# Patient Record
Sex: MALE | Race: WHITE | NOT HISPANIC OR LATINO | Employment: OTHER | URBAN - METROPOLITAN AREA
[De-identification: names, ages, dates, MRNs, and addresses within clinical notes are randomized per-mention and may not be internally consistent; named-entity substitution may affect disease eponyms.]

---

## 2017-01-17 ENCOUNTER — OFFICE VISIT (OUTPATIENT)
Dept: FAMILY MEDICINE | Facility: CLINIC | Age: 67
End: 2017-01-17
Payer: COMMERCIAL

## 2017-01-17 ENCOUNTER — TELEPHONE (OUTPATIENT)
Dept: FAMILY MEDICINE | Facility: CLINIC | Age: 67
End: 2017-01-17

## 2017-01-17 ENCOUNTER — LAB VISIT (OUTPATIENT)
Dept: LAB | Facility: HOSPITAL | Age: 67
End: 2017-01-17
Attending: FAMILY MEDICINE
Payer: COMMERCIAL

## 2017-01-17 VITALS
TEMPERATURE: 98 F | HEART RATE: 95 BPM | DIASTOLIC BLOOD PRESSURE: 94 MMHG | SYSTOLIC BLOOD PRESSURE: 141 MMHG | RESPIRATION RATE: 18 BRPM | BODY MASS INDEX: 26.95 KG/M2 | HEIGHT: 74 IN | OXYGEN SATURATION: 96 % | WEIGHT: 210 LBS

## 2017-01-17 DIAGNOSIS — E11.40 CONTROLLED TYPE 2 DIABETES MELLITUS WITH DIABETIC NEUROPATHY, WITHOUT LONG-TERM CURRENT USE OF INSULIN: Primary | ICD-10-CM

## 2017-01-17 DIAGNOSIS — E11.40 CONTROLLED TYPE 2 DIABETES MELLITUS WITH DIABETIC NEUROPATHY, WITHOUT LONG-TERM CURRENT USE OF INSULIN: ICD-10-CM

## 2017-01-17 DIAGNOSIS — L57.0 AK (ACTINIC KERATOSIS): ICD-10-CM

## 2017-01-17 DIAGNOSIS — E78.5 HYPERLIPIDEMIA, UNSPECIFIED HYPERLIPIDEMIA TYPE: ICD-10-CM

## 2017-01-17 DIAGNOSIS — N18.3 CHRONIC KIDNEY DISEASE (CKD), STAGE 3 (MODERATE): ICD-10-CM

## 2017-01-17 DIAGNOSIS — I10 ESSENTIAL HYPERTENSION: ICD-10-CM

## 2017-01-17 DIAGNOSIS — Z87.19 HISTORY OF ULCERATIVE COLITIS: ICD-10-CM

## 2017-01-17 DIAGNOSIS — M10.9 GOUT, UNSPECIFIED: ICD-10-CM

## 2017-01-17 LAB
ALBUMIN SERPL BCP-MCNC: 3.5 G/DL
ALP SERPL-CCNC: 103 U/L
ALT SERPL W/O P-5'-P-CCNC: 106 U/L
ANION GAP SERPL CALC-SCNC: 11 MMOL/L
AST SERPL-CCNC: 67 U/L
BASOPHILS # BLD AUTO: 0.02 K/UL
BASOPHILS NFR BLD: 0.1 %
BILIRUB SERPL-MCNC: 1.1 MG/DL
BUN SERPL-MCNC: 27 MG/DL
CALCIUM SERPL-MCNC: 9.3 MG/DL
CHLORIDE SERPL-SCNC: 106 MMOL/L
CHOLEST/HDLC SERPL: 3.8 {RATIO}
CO2 SERPL-SCNC: 24 MMOL/L
CREAT SERPL-MCNC: 1.1 MG/DL
DIFFERENTIAL METHOD: ABNORMAL
EOSINOPHIL # BLD AUTO: 0 K/UL
EOSINOPHIL NFR BLD: 0.1 %
ERYTHROCYTE [DISTWIDTH] IN BLOOD BY AUTOMATED COUNT: 13.9 %
EST. GFR  (AFRICAN AMERICAN): >60 ML/MIN/1.73 M^2
EST. GFR  (NON AFRICAN AMERICAN): >60 ML/MIN/1.73 M^2
ESTIMATED AVG GLUCOSE: 148 MG/DL
GLUCOSE SERPL-MCNC: 126 MG/DL
HBA1C MFR BLD HPLC: 6.8 %
HCT VFR BLD AUTO: 44.6 %
HDL/CHOLESTEROL RATIO: 26 %
HDLC SERPL-MCNC: 150 MG/DL
HDLC SERPL-MCNC: 39 MG/DL
HGB BLD-MCNC: 15.4 G/DL
LDLC SERPL CALC-MCNC: 94.2 MG/DL
LYMPHOCYTES # BLD AUTO: 1.1 K/UL
LYMPHOCYTES NFR BLD: 5.7 %
MCH RBC QN AUTO: 30 PG
MCHC RBC AUTO-ENTMCNC: 34.5 %
MCV RBC AUTO: 87 FL
MONOCYTES # BLD AUTO: 0.6 K/UL
MONOCYTES NFR BLD: 3.1 %
NEUTROPHILS # BLD AUTO: 17.3 K/UL
NEUTROPHILS NFR BLD: 91 %
NONHDLC SERPL-MCNC: 111 MG/DL
PLATELET # BLD AUTO: 181 K/UL
PMV BLD AUTO: 12.4 FL
POTASSIUM SERPL-SCNC: 3.8 MMOL/L
PROT SERPL-MCNC: 6.8 G/DL
RBC # BLD AUTO: 5.14 M/UL
SODIUM SERPL-SCNC: 141 MMOL/L
TRIGL SERPL-MCNC: 84 MG/DL
TSH SERPL DL<=0.005 MIU/L-ACNC: 0.41 UIU/ML
WBC # BLD AUTO: 18.97 K/UL

## 2017-01-17 PROCEDURE — 3046F HEMOGLOBIN A1C LEVEL >9.0%: CPT | Mod: S$GLB,,, | Performed by: FAMILY MEDICINE

## 2017-01-17 PROCEDURE — 3080F DIAST BP >= 90 MM HG: CPT | Mod: S$GLB,,, | Performed by: FAMILY MEDICINE

## 2017-01-17 PROCEDURE — 3066F NEPHROPATHY DOC TX: CPT | Mod: S$GLB,,, | Performed by: FAMILY MEDICINE

## 2017-01-17 PROCEDURE — 17110 DESTRUCTION B9 LES UP TO 14: CPT | Mod: S$GLB,,, | Performed by: FAMILY MEDICINE

## 2017-01-17 PROCEDURE — 99215 OFFICE O/P EST HI 40 MIN: CPT | Mod: 25,S$GLB,, | Performed by: FAMILY MEDICINE

## 2017-01-17 PROCEDURE — 36415 COLL VENOUS BLD VENIPUNCTURE: CPT

## 2017-01-17 PROCEDURE — 85025 COMPLETE CBC W/AUTO DIFF WBC: CPT

## 2017-01-17 PROCEDURE — 99999 PR PBB SHADOW E&M-EST. PATIENT-LVL IV: CPT | Mod: PBBFAC,,, | Performed by: FAMILY MEDICINE

## 2017-01-17 PROCEDURE — 80061 LIPID PANEL: CPT

## 2017-01-17 PROCEDURE — 2022F DILAT RTA XM EVC RTNOPTHY: CPT | Mod: S$GLB,,, | Performed by: FAMILY MEDICINE

## 2017-01-17 PROCEDURE — 80053 COMPREHEN METABOLIC PANEL: CPT

## 2017-01-17 PROCEDURE — 1160F RVW MEDS BY RX/DR IN RCRD: CPT | Mod: S$GLB,,, | Performed by: FAMILY MEDICINE

## 2017-01-17 PROCEDURE — 1159F MED LIST DOCD IN RCRD: CPT | Mod: S$GLB,,, | Performed by: FAMILY MEDICINE

## 2017-01-17 PROCEDURE — 3077F SYST BP >= 140 MM HG: CPT | Mod: S$GLB,,, | Performed by: FAMILY MEDICINE

## 2017-01-17 PROCEDURE — 1157F ADVNC CARE PLAN IN RCRD: CPT | Mod: S$GLB,,, | Performed by: FAMILY MEDICINE

## 2017-01-17 PROCEDURE — 84443 ASSAY THYROID STIM HORMONE: CPT

## 2017-01-17 PROCEDURE — 83036 HEMOGLOBIN GLYCOSYLATED A1C: CPT

## 2017-01-17 RX ORDER — FINASTERIDE 5 MG/1
5 TABLET, FILM COATED ORAL DAILY
COMMUNITY
End: 2017-12-20 | Stop reason: SDUPTHER

## 2017-01-17 NOTE — PROGRESS NOTES
(Portions of this note were dictated using voice recognition software and may contain dictation related errors in spelling/grammar/syntax not found on text review)    CC: No chief complaint on file.      HPI: 66 y.o. male for annual exam.  Feeling well.  No  Complaints.  Feeling well.    Diabetes: Stable on metformin 500 mg twice a day.  A1c has been controlled next    Hypertension, on hydrochlorothiazide 12.5 m g daily, lisinopril 5 mrem daily, metoprolol 50 mg daily.  Blood pressure is mildly suboptimal today but this is unusual for him as he states that he has been dealing with his wife's illness recently and has been stressed.  He checks his blood pressure daily and usually gets  110-120/70-80.  Occasionally gets hypotensive and has to stop his metoprolol for couple of days    Chronic kidney disease, stable.  Sees nephrology, needs referral    Hyper lipidemia on pravastatin 40 mg daily next    Sees outside cardiologist, Dr. aDo.  Needs referral.  Does not have established CAD    Lumbar radiculopathy on the last, seen pain management.  Currently doing well with this.  Does get some residual numbness in his left leg.    Needs referral for his eye exam which has been over a year ago next    Did have elevated PSA, saw urology.  On finasteride, PSA has normalized    Also has a history of actinic keratosis, has several lesions that have been bothering him recently on the left arm and also left ear.  Does see a dermatologist periodically for treatment of this but would like at least the most bothersome lesions frozen today    Past Medical History   Diagnosis Date    BPH (benign prostatic hyperplasia)      s/p TURP    Chronic kidney disease     DDD (degenerative disc disease) 10/21/2013    Diabetes mellitus     Diabetes mellitus with renal complications     Disc disease, degenerative, cervical     GERD (gastroesophageal reflux disease)     Gout, chronic     History of ulcerative colitis      s/p colectomy  and ileostomy    HLD (hyperlipidemia)     HTN (hypertension)     Ileostomy in place 1982       Past Surgical History   Procedure Laterality Date    Colectomy and ileostomy      Transurethral resection of prostate      Cervical fusion         No family history on file.    Social History     Social History    Marital status:      Spouse name: N/A    Number of children: N/A    Years of education: N/A     Occupational History    Not on file.     Social History Main Topics    Smoking status: Never Smoker    Smokeless tobacco: Never Used    Alcohol use No    Drug use: No    Sexual activity: Not on file     Other Topics Concern    Not on file     Social History Narrative     SCREENINGS:   PSA from 2015 was mildly elevatd at 4.3, saw urology.  Placed on finasteride and normalized to `1.4 6/6/16   Colonoscopy, patient is status post colectomy   Bone density 2010 is normal    Immunizations  Flu shot: utd    Tetanus Adacel 2015  PVX 9/5/14  PCV: utd 10.16      Lab Results   Component Value Date    WBC 6.42 09/10/2015    HGB 15.2 09/10/2015    HCT 45.2 09/10/2015     (L) 09/10/2015    CHOL 128 09/10/2015    TRIG 130 09/10/2015    HDL 35 (L) 09/10/2015    ALT 39 09/10/2015    AST 36 09/10/2015     01/08/2016    K 4.2 01/08/2016     01/08/2016    CREATININE 1.2 01/08/2016    BUN 19 01/08/2016    CO2 29 01/08/2016    TSH 1.035 09/10/2015    PSA 2.1 09/15/2014    INR 1.2 07/08/2010    HGBA1C 6.6 (H) 09/10/2015    LDLCALC 67.0 09/10/2015     (H) 01/08/2016       ROS:  GENERAL: No fever, chills, fatigability or weight loss.  SKIN: No rashes, no itching.  HEAD: No headaches.  EYES: No visual changes  EARS: No ear pain or changes in hearing.  NOSE: No congestion or rhinorrhea.  MOUTH & THROAT: No hoarseness, change in voice, or sore throat.  NODES: Denies swollen glands.  CHEST: Denies WILSON, cyanosis, wheezing, cough and sputum production.  CARDIOVASCULAR: Denies chest pain, PND,  orthopnea.  ABDOMEN: No nausea, vomiting, or changes in bowel function.  URINARY: No flank pain, dysuria or hematuria.  PERIPHERAL VASCULAR: No claudication or cyanosis.  MUSCULOSKELETAL: No joint stiffness or swelling. Denies back pain.  NEUROLOGIC: above    Vital signs reviewed  PE:   APPEARANCE: Well nourished, well developed, in no acute distress.    HEAD: Normocephalic, atraumatic.  SKIN: Multiple actinic keratoses noted on left arm, also left ear.  Chronic depigmentation changesfrom prior procedures.  EYES: PERRL. EOMI.   Conjunctivae noninjected.  EARS: TM's intact. Light reflex normal. No retraction or perforation  NOSE: Mucosa pink. Airway clear.  MOUTH & THROAT: No tonsillar enlargement. No pharyngeal erythema or exudate.   NECK: Supple with no cervical lymphadenopathy. No carotid bruits, no thyromegaly  CHEST: Good inspiratory effort. Lungs clear to auscultation with no wheezes or crackles.  CARDIOVASCULAR: Normal S1, S2. No rubs, murmurs, or gallops.  ABDOMEN: Bowel sounds normal. Not distended. Soft. No tenderness or masses. No organomegaly.  EXTREMITIES: No edema, cyanosis, or clubbing.  Protective Sensation (w/ 10 gram monofilament):  Right: Intact  Left: Intact    Visual Inspection:  Normal -  Bilateral    Pedal Pulses:   Right: Present  Left: Present    Posterior tibialis:   Right:Present  Left: Present        IMPRESSION  1. Controlled type 2 diabetes mellitus with diabetic neuropathy, without long-term current use of insulin    2. Gout, unspecified    3. Chronic kidney disease (CKD), stage 3 (moderate)    4. Essential hypertension    5. Hyperlipidemia, unspecified hyperlipidemia type    6. History of ulcerative colitis    7. AK (actinic keratosis)            PLAN  Diabetes health maintenance:  -- advise regular and consistent glucose monitoring and medication compliance.  -- advise daily foot checks  -- advise yearly ophthalmologic exams  -- advise adequate dietary and exercise modification  --  advise regular dental visits  -- advise daily low-dose aspirin use if patient is not on other anticoagulants and there are no other contraindications.  -- Medication management: Continue metformin 500 mg twice a day next    Hypertension: Suboptimal in the office but controlled at home as above.  Given some hypotensive tendencies, will continue his current medications without addition of new medications or increasing any doses    Hyperlipidemia: Continue pravastatin.  Check labs    Ulcerative colitis: Status post colectomy, stable.    Chronic disease: Needs referral back to nephrology, provided next    Also needs referral to his cardiologist, this is been provided as well.  Next    Needs referral for eye exam, provided    Actinic keratosis: Discussed cryotherapy in the office.  Patient agrees to cryotherapy of most worrisome or bothersome lesions.  Cryotherapy ×8 done to the left arm actinic keratosis lesions.  Also cryotherapy ×3 to the left pinna.  May want to reestablish with his dermatologist regarding full body skin exam and treatment of his multiple other actinic keratoses noted on exam    Orders Placed This Encounter   Procedures    CBC auto differential    Comprehensive metabolic panel    Hemoglobin A1c    Lipid panel    TSH    Protein / creatinine ratio, urine    Microalbumin/creatinine urine ratio    Ambulatory referral to Ophthalmology    Ambulatory referral to Cardiology    Ambulatory referral to Nephrology

## 2017-01-17 NOTE — MR AVS SNAPSHOT
[address]  [place] [address]  Phone: [phone]  Fax: [phone]                  Jarrett Charlton Jr.   2017 9:40 AM   Office Visit    Description:  Male : 1950   Provider:  Elvis Ruiz MD   Department:  [facility]           Reason for Visit     Annual Exam           Diagnoses this Visit        Comments    Controlled type 2 diabetes mellitus with diabetic neuropathy, without long-term current use of insulin    -  Primary     Gout, unspecified         Chronic kidney disease (CKD), stage 3 (moderate)         Essential hypertension         Hyperlipidemia, unspecified hyperlipidemia type         History of ulcerative colitis                To Do List           Future Appointments        Provider Department Dept Phone    2017 11:20 AM APPOINTMENT LAB, [facility] [phone]    2017 11:30 AM APPOINTMENT LAB, [facility] [phone]    2/15/2017 8:00 AM DO Lukasz Mejía harsha - Nephrology [phone]      Goals (5 Years of Data)     None      [facility] On Call     [facility] On [facility] Nurse Care Line -  Assistance  Registered nurses in the [facility] Center provide clinical advisement, health education, appointment booking, and other advisory services.  Call for this free service at [phone].             Medications           Message regarding Medications     Verify the changes and/or additions to your medication regime listed below are the same as discussed with your clinician today.  If any of these changes or additions are incorrect, please notify your healthcare provider.        STOP taking these medications     tramadol (ULTRAM) 50 mg tablet TAKE ONE TABLET BY MOUTH EVERY 6 HOURS IF NEEDED FOR PAIN           Verify that the below list of medications is an accurate representation of the medications you are currently taking.  If none  "reported, the list may be blank. If incorrect, please contact your healthcare provider. Carry this list with you in case of emergency.           Current Medications     allopurinol (ZYLOPRIM) 300 MG tablet Take 1.5 tablets (450 mg total) by mouth once daily.    aspirin (ECOTRIN) 81 MG EC tablet Take 81 mg by mouth once daily.      blood sugar diagnostic (ONE TOUCH ULTRA TEST) Strp USE ONE STRIP TO CHECK GLUCOSE EVERY DAY    colchicine 0.6 mg tablet     duloxetine (CYMBALTA) 30 MG capsule Take 1 capsule (30 mg total) by mouth once daily.    finasteride (PROSCAR) 5 mg tablet Take 5 mg by mouth once daily.    gabapentin (NEURONTIN) 300 MG capsule Take 2 capsules (600 mg total) by mouth 2 (two) times daily.    hydrochlorothiazide (HYDRODIURIL) 12.5 MG Tab Take 1 tablet (12.5 mg total) by mouth once daily.    lisinopril (PRINIVIL,ZESTRIL) 5 MG tablet TAKE ONE TABLET BY MOUTH ONCE DAILY    metformin (GLUCOPHAGE) 500 MG tablet Take 1 tablet (500 mg total) by mouth 2 (two) times daily with meals.    metoprolol succinate (TOPROL-XL) 50 MG 24 hr tablet     omeprazole (PRILOSEC) 40 MG capsule Take 1 capsule (40 mg total) by mouth every morning.    pravastatin (PRAVACHOL) 40 MG tablet Take 40 mg by mouth once daily.    diclofenac sodium 1 % Gel Apply 2 g topically 4 (four) times daily.           Clinical Reference Information           Vital Signs - Last Recorded  Most recent update: 1/17/2017 10:15 AM by Stephen Haddad LPN    BP Pulse Temp Resp Ht Wt    (!) 141/94 95 97.6 °F (36.4 °C) (Oral) 18 6' 2" (1.88 m) 95.3 kg (210 lb)    SpO2 BMI             96% 26.96 kg/m2         Blood Pressure          Most Recent Value    BP  (!)  141/94      Allergies as of 1/17/2017     No Known Allergies      Immunizations Administered on Date of Encounter - 1/17/2017     None      Orders Placed During Today's Visit      Normal Orders This Visit    Ambulatory referral to Cardiology     Ambulatory referral to Nephrology     Ambulatory " referral to Ophthalmology     Protein / creatinine ratio, urine     Future Labs/Procedures Expected by Expires    CBC auto differential  1/17/2017 1/17/2018    Comprehensive metabolic panel  1/17/2017 1/17/2018    Lipid panel  1/17/2017 1/17/2018    Microalbumin/creatinine urine ratio  1/17/2017 (Approximate) 1/17/2018    TSH  1/17/2017 1/17/2018    Hemoglobin A1c  As directed 1/17/2018      [facility] Sign-Up     Activating your [facility] account is as easy as 1-2-3!     1) Visit [email or web address], select Sign Up Now, enter this activation code and your date of birth, then select Next.  [email or web address]  Expires: 3/3/2017 10:39 AM      2) Create a username and password to use when you visit [facility] in the future and select a security question in case you lose your password and select Next.    3) Enter your e-mail address and click Sign Up!    Additional Information  If you have questions, please e-mail [email or web address] or call [phone] to talk to our [facility] staff. Remember, [facility] is NOT to be used for urgent needs. For medical emergencies, dial 911.

## 2017-01-18 ENCOUNTER — TELEPHONE (OUTPATIENT)
Dept: FAMILY MEDICINE | Facility: CLINIC | Age: 67
End: 2017-01-18

## 2017-01-20 ENCOUNTER — TELEPHONE (OUTPATIENT)
Dept: FAMILY MEDICINE | Facility: CLINIC | Age: 67
End: 2017-01-20

## 2017-01-20 NOTE — TELEPHONE ENCOUNTER
----- Message from Liv Amaya sent at 1/20/2017  8:52 AM CST -----  Contact: [phone]  Fax [phone]    Patient is requesting to have his forms faxed again the documents partially printed

## 2017-01-22 ENCOUNTER — TELEPHONE (OUTPATIENT)
Dept: FAMILY MEDICINE | Facility: CLINIC | Age: 67
End: 2017-01-22

## 2017-01-22 DIAGNOSIS — R79.89 ELEVATED LFTS: ICD-10-CM

## 2017-01-22 DIAGNOSIS — D72.829 LEUKOCYTOSIS, UNSPECIFIED TYPE: Primary | ICD-10-CM

## 2017-01-23 ENCOUNTER — TELEPHONE (OUTPATIENT)
Dept: FAMILY MEDICINE | Facility: CLINIC | Age: 67
End: 2017-01-23

## 2017-01-23 NOTE — TELEPHONE ENCOUNTER
pls tell pt that diabetes test is controlled. However his white blood cell count was high (not sure if he is getting over any infection recently) and liver test was also elevated. Will recheck a cbc and cmp in 2-3 weeks to follow up on these abnormalities to see if they improve on recheck.

## 2017-01-23 NOTE — TELEPHONE ENCOUNTER
Call placed to pt to inform him on lab results per Dr. Ruiz.  Pt was advised to have labs redone in 2-3 weeks.  Pt verbalized understanding.

## 2017-02-02 ENCOUNTER — TELEPHONE (OUTPATIENT)
Dept: FAMILY MEDICINE | Facility: CLINIC | Age: 67
End: 2017-02-02

## 2017-02-02 NOTE — TELEPHONE ENCOUNTER
----- Message from Jesusita Maldonado sent at 2/1/2017  2:27 PM CST -----  Contact: self/[phone]  Patient says that he requested copies of his blood work from 0/17/17 to be mailed to him and he hasn't received anything and he also states that he was supposed to have referrals for Cardiology, Dr. Mason, optometry, Dr. Sahu, and urology with Dr. Cast and his insurance never received the referrals for these doctors.  Please advise

## 2017-02-15 ENCOUNTER — OFFICE VISIT (OUTPATIENT)
Dept: NEPHROLOGY | Facility: CLINIC | Age: 67
End: 2017-02-15
Payer: COMMERCIAL

## 2017-02-15 VITALS
HEIGHT: 74 IN | HEART RATE: 76 BPM | OXYGEN SATURATION: 98 % | WEIGHT: 210.13 LBS | SYSTOLIC BLOOD PRESSURE: 150 MMHG | BODY MASS INDEX: 26.97 KG/M2 | DIASTOLIC BLOOD PRESSURE: 80 MMHG

## 2017-02-15 DIAGNOSIS — I10 ESSENTIAL HYPERTENSION: ICD-10-CM

## 2017-02-15 DIAGNOSIS — N18.2 CKD (CHRONIC KIDNEY DISEASE) STAGE 2, GFR 60-89 ML/MIN: Primary | ICD-10-CM

## 2017-02-15 DIAGNOSIS — N25.0 RENAL OSTEODYSTROPHY: ICD-10-CM

## 2017-02-15 PROCEDURE — 1159F MED LIST DOCD IN RCRD: CPT | Mod: S$GLB,,, | Performed by: INTERNAL MEDICINE

## 2017-02-15 PROCEDURE — 3077F SYST BP >= 140 MM HG: CPT | Mod: S$GLB,,, | Performed by: INTERNAL MEDICINE

## 2017-02-15 PROCEDURE — 99999 PR PBB SHADOW E&M-EST. PATIENT-LVL III: CPT | Mod: PBBFAC,,, | Performed by: INTERNAL MEDICINE

## 2017-02-15 PROCEDURE — 1160F RVW MEDS BY RX/DR IN RCRD: CPT | Mod: S$GLB,,, | Performed by: INTERNAL MEDICINE

## 2017-02-15 PROCEDURE — 1126F AMNT PAIN NOTED NONE PRSNT: CPT | Mod: S$GLB,,, | Performed by: INTERNAL MEDICINE

## 2017-02-15 PROCEDURE — 3079F DIAST BP 80-89 MM HG: CPT | Mod: S$GLB,,, | Performed by: INTERNAL MEDICINE

## 2017-02-15 PROCEDURE — 1157F ADVNC CARE PLAN IN RCRD: CPT | Mod: S$GLB,,, | Performed by: INTERNAL MEDICINE

## 2017-02-15 PROCEDURE — 99214 OFFICE O/P EST MOD 30 MIN: CPT | Mod: S$GLB,,, | Performed by: INTERNAL MEDICINE

## 2017-02-15 NOTE — PROGRESS NOTES
Subjective:       Patient ID: Jarrett Charlton Jr. is a 66 y.o. White male who presents for follow-up evaluation of No chief complaint on file.    HPI This is a 66-year-old white male with longstanding hypertension, anemia, and chronic kidney disease is coming in for followup of these. DM labile per pt . Bp's stable at home in the 120's- 130's/80's and occ lower  In the 90's. No proteinuria and anemia stable. Recently getting over an URI s/p abx.    PAST MEDICAL HISTORY: Hypertension for 5 years,recently diag diabetes, gout, ulcerative colitis with ostomy and hyperlipidemia.     Review of Systems   Constitutional: Positive for fatigue.   Eyes: Negative for discharge.   Respiratory: Negative for cough, shortness of breath and wheezing.    Cardiovascular: Negative for chest pain and palpitations.   Gastrointestinal: Negative for abdominal pain, diarrhea, nausea and vomiting.   Genitourinary: Negative for dysuria, frequency, hematuria and urgency.   Skin: Negative for color change and rash.   Psychiatric/Behavioral: Negative for confusion.   All other systems reviewed and are negative.      Objective:      Physical Exam   Constitutional: He is oriented to person, place, and time. He appears well-developed and well-nourished.   HENT:   Right Ear: External ear normal.   Left Ear: External ear normal.   Nose: Nose normal.   Mouth/Throat: Normal dentition.   Eyes: Conjunctivae, EOM and lids are normal. Pupils are equal, round, and reactive to light.   Neck: Trachea normal. No thyroid mass present.   Cardiovascular: Normal rate and regular rhythm.  Exam reveals no friction rub.    No murmur heard.  No lower extremity edema   Pulmonary/Chest: Effort normal and breath sounds normal. No respiratory distress. He has no decreased breath sounds. He has no rales.   Abdominal: Soft. Bowel sounds are normal. He exhibits no mass. There is no tenderness. There is no rebound. No hernia.   Musculoskeletal: He exhibits no edema.    Neurological: He is alert and oriented to person, place, and time.   Skin: Skin is warm and dry. No rash noted. No erythema.   Psychiatric: He has a normal mood and affect. Judgment normal. His mood appears not anxious. He does not exhibit a depressed mood.   Oriented to time, place and person.   Vitals reviewed.      Assessment:       No diagnosis found.    Plan:           This is a 66 year-old white male with hypertension,CKD,DM,   ulcerative colitis who is coming in for followup.     PROBLEMS:   1. CKD stage II with an MDRD GFR of 60 mL/minute. His baseline   creatinines are anywhere from 1.5 to 1.7 with average in the 1.4 to 1.5 range with the most recent creatinine being 1.1. His CKD is secondary to age-related nephron loss, HTN along with NSAID use in the past for his gout. Off NSAID's. Hydrate well especially when he has more ostomy output.  2. Hypertension. Bp's stable in the 120's-130's  but at times lower in the 90's.   Monitor bp's closely. Asked pt in the apst to take 12.5 instead of 25 mg but pt is taking 25 mg-decerase to 12.5 mg and monitor.  3. Anemia. H&H is stable.   4. Renal osteodystrophy. His intact PTH along with ca/phos is stable. Repeat. Vit d stable-on vit d.  5. Proteinuria. He has no proteinuria. Recheck. On lisnopril.    We will see the patient back again in 4 Months with rfp, i pth, urine creatinine and urine protein.

## 2017-02-15 NOTE — LETTER
February 15, 2017      Elvis Ruiz MD  [address]  [address]  [facility] [address]           [facility] - Nephrology  [address]  [place] [address]  Phone: [phone]  Fax: [phone]          Patient: Jarrett Chralton Jr.   MR Number: [id number]   YOB: 1950   Date of Visit: 2/15/2017       Dear Dr. Elvis Ruiz:    Thank you for referring Jarrett Charlton to me for evaluation. Attached you will find relevant portions of my assessment and plan of care.    If you have questions, please do not hesitate to call me. I look forward to following Jarrett Charlton along with you.    Sincerely,    Bertin Lyon DO    Enclosure  CC:  No Recipients    If you would like to receive this communication electronically, please contact [email or web address] or [phone] to request more information on [employer] access.    For providers and/or their staff who would like to refer a patient to [facility], please contact us through our one-stop-shop provider referral line, [facility], at [phone].    If you feel you have received this communication in error or would no longer like to receive these types of communications, please e-mail [email or web address]

## 2017-02-17 ENCOUNTER — TELEPHONE (OUTPATIENT)
Dept: NEPHROLOGY | Facility: CLINIC | Age: 67
End: 2017-02-17

## 2017-02-18 ENCOUNTER — TELEPHONE (OUTPATIENT)
Dept: NEPHROLOGY | Facility: CLINIC | Age: 67
End: 2017-02-18

## 2017-02-20 ENCOUNTER — TELEPHONE (OUTPATIENT)
Dept: NEPHROLOGY | Facility: CLINIC | Age: 67
End: 2017-02-20

## 2017-04-17 ENCOUNTER — TELEPHONE (OUTPATIENT)
Dept: NEPHROLOGY | Facility: CLINIC | Age: 67
End: 2017-04-17

## 2017-04-17 NOTE — TELEPHONE ENCOUNTER
Please see earlier tel note from nursing.  Please ask him if he is taking lisinopril 5 mg and is holding HCTZ 12.5 mg.  I don't see any new labs on him. DId he get them somewhere else and can he fax them to us. What are his Bp's?

## 2017-04-17 NOTE — TELEPHONE ENCOUNTER
----- Message from Lianna Angeles MA sent at 4/13/2017  2:56 PM CDT -----  Contact: pt  [phone]      ----- Message -----     From: Laisha Zazueta     Sent: 4/13/2017  11:27 AM       To: Sonali Denton Staff    Sonali  -  Patient was dehyrated and went to see another Dr. Pt was order to stop taking hydrochlorothiazide .  Pt needs to know what other steps to take from the medication   Thanks,

## 2017-04-18 ENCOUNTER — TELEPHONE (OUTPATIENT)
Dept: UROLOGY | Facility: CLINIC | Age: 67
End: 2017-04-18

## 2017-04-18 ENCOUNTER — TELEPHONE (OUTPATIENT)
Dept: NEPHROLOGY | Facility: CLINIC | Age: 67
End: 2017-04-18

## 2017-04-18 DIAGNOSIS — R97.20 ELEVATED PSA: Primary | ICD-10-CM

## 2017-04-18 NOTE — TELEPHONE ENCOUNTER
Ok to continue finasteride.  It should not affect his BP.  Will check his PSA prior to his next visit with me.    Lab Results   Component Value Date    PSA 2.1 09/15/2014    PSA 2.16 05/13/2013    PSA 1.2 03/19/2012    PSADIAG 1.4 06/06/2016    PSADIAG 2.1 10/16/2015    PSADIAG 4.3 (H) 06/19/2015       Diagnoses and all orders for this visit:    Elevated PSA  -     Prostate Specific Antigen, Diagnostic; Future

## 2017-04-18 NOTE — TELEPHONE ENCOUNTER
----- Message from Libby Arias sent at 4/18/2017  8:56 AM CDT -----  Contact: pt [phone]  Pt states he would like to speak with the nurse regarding questions he has about his medications. Pt states he keeps getting dehydrated and was seen in the ER a couple of times for it and the doctor asked him questions about why he was taking finasteride and advised him not to take it. Pt would like to discuss this and find out if this is okay and should he take a different medication.  Please call pt.

## 2017-04-18 NOTE — TELEPHONE ENCOUNTER
States he went to the ER for low blood pressure and dehydration. The ER MD said to stop finasteride. He wants to know should he take something else. He has a follow up with you in [place]

## 2017-06-05 ENCOUNTER — LAB VISIT (OUTPATIENT)
Dept: LAB | Facility: HOSPITAL | Age: 67
End: 2017-06-05
Attending: UROLOGY
Payer: COMMERCIAL

## 2017-06-05 DIAGNOSIS — R97.20 ELEVATED PSA: ICD-10-CM

## 2017-06-05 LAB — COMPLEXED PSA SERPL-MCNC: 1.7 NG/ML

## 2017-06-05 PROCEDURE — 84153 ASSAY OF PSA TOTAL: CPT

## 2017-06-05 PROCEDURE — 36415 COLL VENOUS BLD VENIPUNCTURE: CPT

## 2017-06-06 RX ORDER — COLCHICINE 0.6 MG/1
0.6 TABLET ORAL 2 TIMES DAILY
Qty: 60 TABLET | Refills: 6 | Status: SHIPPED | OUTPATIENT
Start: 2017-06-06 | End: 2017-06-06 | Stop reason: SDUPTHER

## 2017-06-06 RX ORDER — COLCHICINE 0.6 MG/1
0.6 TABLET ORAL 2 TIMES DAILY
Qty: 180 TABLET | Refills: 1 | Status: SHIPPED | OUTPATIENT
Start: 2017-06-06 | End: 2017-10-23 | Stop reason: SDUPTHER

## 2017-06-09 ENCOUNTER — OFFICE VISIT (OUTPATIENT)
Dept: UROLOGY | Facility: CLINIC | Age: 67
End: 2017-06-09
Payer: MEDICARE

## 2017-06-09 VITALS
SYSTOLIC BLOOD PRESSURE: 136 MMHG | WEIGHT: 208 LBS | BODY MASS INDEX: 26.69 KG/M2 | HEART RATE: 87 BPM | DIASTOLIC BLOOD PRESSURE: 90 MMHG | HEIGHT: 74 IN

## 2017-06-09 DIAGNOSIS — Z90.79 S/P TURP: ICD-10-CM

## 2017-06-09 DIAGNOSIS — R97.20 ELEVATED PSA: Primary | ICD-10-CM

## 2017-06-09 PROCEDURE — 99999 PR PBB SHADOW E&M-EST. PATIENT-LVL III: CPT | Mod: PBBFAC,,, | Performed by: UROLOGY

## 2017-06-09 PROCEDURE — 99214 OFFICE O/P EST MOD 30 MIN: CPT | Mod: S$GLB,,, | Performed by: UROLOGY

## 2017-06-09 PROCEDURE — 1159F MED LIST DOCD IN RCRD: CPT | Mod: S$GLB,,, | Performed by: UROLOGY

## 2017-06-09 PROCEDURE — 1126F AMNT PAIN NOTED NONE PRSNT: CPT | Mod: S$GLB,,, | Performed by: UROLOGY

## 2017-06-09 NOTE — PROGRESS NOTES
Jarrett Charlton Jr. is a 67 y.o. man who is here for the evaluation of difficult urination.  Seen him for elevated PSA.  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  He was not interested in SUDS.  S/p colon surgery and his anus is closed.  Therefore dong TRUS bx of prostate is not feasible.  After discussion, we decided to try finasteride to see whether it will lower his PSA.    hx of straining to urinate but not bothered by it.  Even then his urine flow is not strong.  He tried flomax for the past 2 months without significant improvement.  Does feel that he is emptying his bladder completely.  Hx of ulcerative colitis, had colon surgery back in 1985 and his anus was closed.  Has a neuropathy on the right leg.  Hx of sciatic nerve on both sides and received injection on the back.  Hx of diabetes diagnosed 1 year ago.  No family hx of prostate cancer.  He is a .  Denies flank pain, dysuira, hematuria .      Past Medical History:   Diagnosis Date    BPH (benign prostatic hyperplasia)     s/p TURP    Chronic kidney disease     DDD (degenerative disc disease) 10/21/2013    Diabetes mellitus     Diabetes mellitus with renal complications     Disc disease, degenerative, cervical     GERD (gastroesophageal reflux disease)     Gout, chronic     History of ulcerative colitis     s/p colectomy and ileostomy    HLD (hyperlipidemia)     HTN (hypertension)     Ileostomy in place 1982     Past Surgical History:   Procedure Laterality Date    CERVICAL FUSION      colectomy and ileostomy      TRANSURETHRAL RESECTION OF PROSTATE       Social History   Substance Use Topics    Smoking status: Never Smoker    Smokeless tobacco: Never Used    Alcohol use No     History reviewed. No pertinent family history.  Allergy:  No Known Allergies  Outpatient Encounter Prescriptions as of 6/9/2017   Medication Sig Dispense Refill     allopurinol (ZYLOPRIM) 300 MG tablet Take 1.5 tablets (450 mg total) by mouth once daily. 45 tablet 11    aspirin (ECOTRIN) 81 MG EC tablet Take 81 mg by mouth once daily.        blood sugar diagnostic (ONE TOUCH ULTRA TEST) Strp USE ONE STRIP TO CHECK GLUCOSE EVERY  each 11    colchicine 0.6 mg tablet Take 1 tablet (0.6 mg total) by mouth 2 (two) times daily. 180 tablet 1    finasteride (PROSCAR) 5 mg tablet Take 5 mg by mouth once daily.      gabapentin (NEURONTIN) 300 MG capsule Take 2 capsules (600 mg total) by mouth 2 (two) times daily. 120 capsule 11    hydrochlorothiazide (HYDRODIURIL) 12.5 MG Tab Take 1 tablet (12.5 mg total) by mouth once daily. 30 tablet 4    lisinopril (PRINIVIL,ZESTRIL) 5 MG tablet TAKE ONE TABLET BY MOUTH ONCE DAILY 30 tablet 11    metformin (GLUCOPHAGE) 500 MG tablet Take 1 tablet (500 mg total) by mouth 2 (two) times daily with meals. 60 tablet 11    metoprolol succinate (TOPROL-XL) 50 MG 24 hr tablet       omeprazole (PRILOSEC) 40 MG capsule Take 1 capsule (40 mg total) by mouth every morning. 30 capsule 6    pravastatin (PRAVACHOL) 40 MG tablet Take 40 mg by mouth once daily.      diclofenac sodium 1 % Gel Apply 2 g topically 4 (four) times daily. 1 Tube 2    [DISCONTINUED] duloxetine (CYMBALTA) 30 MG capsule Take 1 capsule (30 mg total) by mouth once daily. 90 capsule 11     No facility-administered encounter medications on file as of 6/9/2017.      Review of Systems   General ROS: GENERAL:  No weight gain or loss  SKIN:  No rashes or lacerations  HEAD:  No headaches  EYES:  No exophthalmos, jaundice or blindness  EARS:  No dizziness, tinnitus or hearing loss  NOSE:  No changes in smell  MOUTH & THROAT:  No dyskinetic movements or obvious goiter  CHEST:  No shortness of breath, hyperventilation or cough  CARDIOVASCULAR:  No tachycardia or chest pain  ABDOMEN:  No nausea, vomiting, pain, constipation or diarrhea  URINARY:  No frequency, dysuria or sexual  dysfunction  ENDOCRINE:  No polydipsia, polyuria  MUSCULOSKELETAL:  No pain or stiffness of the joints  NEUROLOGIC:  No weakness, sensory changes, seizures, confusion, memory loss, tremor or other abnormal movements  Physical Exam     Vitals:    06/09/17 0823   BP: (!) 136/90   Pulse: 87     General Appearance:  Alert, cooperative, no distress, appears stated age   Head:  Normocephalic, without obvious abnormality, atraumatic   Eyes:  PERRL, conjunctiva/corneas clear, EOM's intact, fundi benign, both eyes   Ears:  Normal TM's and external ear canals, both ears   Nose: Nares normal, septum midline, mucosa normal, no drainage or sinus tenderness   Throat: Lips, mucosa, and tongue normal; teeth and gums normal   Neck: Supple, symmetrical, trachea midline, no adenopathy, thyroid: not enlarged, symmetric, no tenderness/mass/nodules, no carotid bruit or JVD   Back:   Symmetric, no curvature, ROM normal, no CVA tenderness   Lungs:   Clear to auscultation bilaterally, respirations unlabored   Chest Wall:  No tenderness or deformity   Heart:  Regular rate and rhythm, S1, S2 normal, no murmur, rub or gallop   Abdomen:   Soft, non-tender, bowel sounds active all four quadrants,  no masses, no organomegaly  Ileostomy in tact.   Genitalia:  Normal male, normal testicles, normal epididymis, normal vas palpated.       Extremities: Extremities normal, atraumatic, no cyanosis or edema   Pulses: 2+ and symmetric   Skin: Skin color, texture, turgor normal, no rashes or lesions   Lymph nodes: Cervical, supraclavicular, and axillary nodes normal   Neurologic: Normal     Prostate unable to check.     PVR 0 ml per bladder scan.    LABS:  Lab Results   Component Value Date    PSA 2.1 09/15/2014    PSA 2.16 05/13/2013    PSA 1.2 03/19/2012    PSADIAG 1.7 06/05/2017    PSADIAG 1.4 06/06/2016    PSADIAG 2.1 10/16/2015     Lab Results   Component Value Date    CREATININE 1.1 01/17/2017    CREATININE 1.2 01/08/2016    CREATININE 1.4 10/16/2015      Assessment and Plan:  Jarrett was seen today for benign prostatic hypertrophy.    Diagnoses and all orders for this visit:    Elevated PSA  -     Prostate Specific Antigen, Diagnostic; Future    S/P TURP    90 days refills given.  His PSA is normalized and responded well to finasteride.  Reassurance given.  He can be followed by a PA.  If next PSA is stable, may not need a PSA check in the future.  I spent 25 minutes with the patient of which more than half was spent in direct consultation with the patient in regards to our treatment and plan.      Follow-up:  Return in about 1 year (around 6/9/2018) for PSA wtih a PA.

## 2017-06-09 NOTE — PATIENT INSTRUCTIONS
Lab Results   Component Value Date    PSA 2.1 09/15/2014    PSA 2.16 05/13/2013    PSA 1.2 03/19/2012    PSADIAG 1.7 06/05/2017    PSADIAG 1.4 06/06/2016    PSADIAG 2.1 10/16/2015

## 2017-08-04 ENCOUNTER — LAB VISIT (OUTPATIENT)
Dept: LAB | Facility: HOSPITAL | Age: 67
End: 2017-08-04
Payer: COMMERCIAL

## 2017-08-04 DIAGNOSIS — N25.0 RENAL OSTEODYSTROPHY: ICD-10-CM

## 2017-08-04 DIAGNOSIS — I10 ESSENTIAL HYPERTENSION: ICD-10-CM

## 2017-08-04 DIAGNOSIS — E11.9 TYPE 2 DIABETES MELLITUS WITHOUT COMPLICATION: ICD-10-CM

## 2017-08-04 DIAGNOSIS — N18.2 CKD (CHRONIC KIDNEY DISEASE) STAGE 2, GFR 60-89 ML/MIN: ICD-10-CM

## 2017-08-04 LAB
25(OH)D3+25(OH)D2 SERPL-MCNC: 24 NG/ML
ALBUMIN SERPL BCP-MCNC: 3.1 G/DL
ANION GAP SERPL CALC-SCNC: 7 MMOL/L
BUN SERPL-MCNC: 23 MG/DL
CALCIUM SERPL-MCNC: 9.2 MG/DL
CHLORIDE SERPL-SCNC: 107 MMOL/L
CO2 SERPL-SCNC: 29 MMOL/L
CREAT SERPL-MCNC: 1.3 MG/DL
EST. GFR  (AFRICAN AMERICAN): >60 ML/MIN/1.73 M^2
EST. GFR  (NON AFRICAN AMERICAN): 56.1 ML/MIN/1.73 M^2
GLUCOSE SERPL-MCNC: 158 MG/DL
PHOSPHATE SERPL-MCNC: 2.6 MG/DL
POTASSIUM SERPL-SCNC: 4.6 MMOL/L
PTH-INTACT SERPL-MCNC: 69 PG/ML
SODIUM SERPL-SCNC: 143 MMOL/L

## 2017-08-04 PROCEDURE — 80069 RENAL FUNCTION PANEL: CPT

## 2017-08-04 PROCEDURE — 82306 VITAMIN D 25 HYDROXY: CPT

## 2017-08-04 PROCEDURE — 83970 ASSAY OF PARATHORMONE: CPT

## 2017-08-04 PROCEDURE — 36415 COLL VENOUS BLD VENIPUNCTURE: CPT

## 2017-08-10 ENCOUNTER — OFFICE VISIT (OUTPATIENT)
Dept: NEPHROLOGY | Facility: CLINIC | Age: 67
End: 2017-08-10
Payer: COMMERCIAL

## 2017-08-10 VITALS
HEIGHT: 74 IN | WEIGHT: 207.25 LBS | DIASTOLIC BLOOD PRESSURE: 70 MMHG | BODY MASS INDEX: 26.6 KG/M2 | SYSTOLIC BLOOD PRESSURE: 142 MMHG | HEART RATE: 78 BPM | OXYGEN SATURATION: 96 %

## 2017-08-10 DIAGNOSIS — N18.2 CKD (CHRONIC KIDNEY DISEASE) STAGE 2, GFR 60-89 ML/MIN: Primary | ICD-10-CM

## 2017-08-10 DIAGNOSIS — N25.0 RENAL OSTEODYSTROPHY: ICD-10-CM

## 2017-08-10 DIAGNOSIS — I10 ESSENTIAL HYPERTENSION: ICD-10-CM

## 2017-08-10 PROCEDURE — 3078F DIAST BP <80 MM HG: CPT | Mod: S$GLB,,, | Performed by: INTERNAL MEDICINE

## 2017-08-10 PROCEDURE — 1159F MED LIST DOCD IN RCRD: CPT | Mod: S$GLB,,, | Performed by: INTERNAL MEDICINE

## 2017-08-10 PROCEDURE — 99215 OFFICE O/P EST HI 40 MIN: CPT | Mod: S$GLB,,, | Performed by: INTERNAL MEDICINE

## 2017-08-10 PROCEDURE — 3077F SYST BP >= 140 MM HG: CPT | Mod: S$GLB,,, | Performed by: INTERNAL MEDICINE

## 2017-08-10 PROCEDURE — 3008F BODY MASS INDEX DOCD: CPT | Mod: S$GLB,,, | Performed by: INTERNAL MEDICINE

## 2017-08-10 PROCEDURE — 1126F AMNT PAIN NOTED NONE PRSNT: CPT | Mod: S$GLB,,, | Performed by: INTERNAL MEDICINE

## 2017-08-10 PROCEDURE — 99999 PR PBB SHADOW E&M-EST. PATIENT-LVL III: CPT | Mod: PBBFAC,,, | Performed by: INTERNAL MEDICINE

## 2017-08-10 NOTE — PROGRESS NOTES
Subjective:       Patient ID: Jarrett Charlton Jr. is a 67 y.o. White male who presents for follow-up evaluation of No chief complaint on file.    HPI This is a 67-year-old white male with longstanding hypertension, anemia, and chronic kidney disease is coming in for followup of these. DM labile per pt . Bp's  at home in the 108- 112/80's and at times  lower in the 90's. No proteinuria and anemia stable.     PAST MEDICAL HISTORY: Hypertension for 5 years,recently diag diabetes, gout, ulcerative colitis with ostomy and hyperlipidemia.     Review of Systems   Constitutional: Positive for fatigue.   Eyes: Negative for discharge.   Respiratory: Negative for cough, shortness of breath and wheezing.    Cardiovascular: Negative for chest pain and palpitations.   Gastrointestinal: Negative for abdominal pain, diarrhea, nausea and vomiting.   Genitourinary: Negative for dysuria, frequency, hematuria and urgency.   Skin: Negative for color change and rash.   Psychiatric/Behavioral: Negative for confusion.   All other systems reviewed and are negative.      Objective:      Physical Exam   Constitutional: He is oriented to person, place, and time. He appears well-developed and well-nourished.   HENT:   Right Ear: External ear normal.   Left Ear: External ear normal.   Nose: Nose normal.   Mouth/Throat: Normal dentition.   Eyes: Conjunctivae, EOM and lids are normal. Pupils are equal, round, and reactive to light.   Neck: Trachea normal. No thyroid mass present.   Cardiovascular: Normal rate and regular rhythm.  Exam reveals no friction rub.    No murmur heard.  No lower extremity edema   Pulmonary/Chest: Effort normal and breath sounds normal. No respiratory distress. He has no decreased breath sounds. He has no rales.   Abdominal: Soft. Bowel sounds are normal. He exhibits no mass. There is no tenderness. There is no rebound. No hernia.   Ostomy bag inplace.   Musculoskeletal: He exhibits no edema.   Neurological: He is alert  and oriented to person, place, and time.   Skin: Skin is warm and dry. No rash noted. No erythema.   Psychiatric: He has a normal mood and affect. Judgment normal. His mood appears not anxious. He does not exhibit a depressed mood.   Oriented to time, place and person.   Vitals reviewed.      Assessment:       No diagnosis found.    Plan:           This is a 66 year-old white male with hypertension,CKD,DM,   ulcerative colitis who is coming in for followup.     PROBLEMS:   1. CKD stage II with an MDRD GFR of 56 mL/minute. His baseline   creatinines use to beanywhere from 1.5 to 1.7 with average in the 1.3  range with the most recent creatinine being 1.3. His CKD is secondary to age-related nephron loss, HTN along with NSAID use in the past for his gout. Off NSAID's. Hydrate well especially when he has more ostomy output.  2. Hypertension. Bp's low at times lower in the 90's.   Monitor bp's closely. Asked pt to hold HCTZ to help with bp's.   3. Anemia. H&H is stable.   4. Renal osteodystrophy. His intact PTH along with ca/phos is stable.  Vit d low- suggested vit d OTC.  5. Proteinuria. He has no sig proteinuria.  On lisnopril.    We will see the patient back again in 8 Months with rfp, i pth, urine creatinine and urine protein.

## 2017-08-22 DIAGNOSIS — Z12.11 COLON CANCER SCREENING: ICD-10-CM

## 2017-09-18 ENCOUNTER — TELEPHONE (OUTPATIENT)
Dept: RHEUMATOLOGY | Facility: CLINIC | Age: 67
End: 2017-09-18

## 2017-09-18 NOTE — TELEPHONE ENCOUNTER
----- Message from Sanford Campa sent at 9/18/2017 12:34 PM CDT -----  Contact: [email or web address], [phone]  Pt called in stating that he is taking Colchicine, but the copay is too high. He advised that his pharmacy can give him the generic, Colcrys instead, but he needs to know if he can take them. Please call and advise.    Thank you

## 2017-10-09 NOTE — PROGRESS NOTES
(Portions of this note were dictated using voice recognition software and may contain dictation related errors in spelling/grammar/syntax not found on text review)    CC:   Chief Complaint   Patient presents with    Follow-up       HPI: 67 y.o. male   Diabetes: Stable on metformin 500 mg twice a day.  A1c has been controlled       Hypertension, on hydrochlorothiazide 12.5 m g daily, lisinopril 5 mrem daily, metoprolol 50 mg daily.       Chronic kidney disease, stable.  Sees nephrology      Hyperlipidemia on pravastatin 40 mg daily       Sees outside cardiologist, Dr. Dao.   Does not have established CAD     Lumbar radiculopathy on the last, seen pain management.  Currently doing well with this.         Did have elevated PSA, saw urology.  On finasteride, PSA has normalized     Also has a history of actinic keratosis, has large lesion left forearm that has been there for some time, not bothersome but is concerned of getting caught.  Has not seen dermatology in a long time.  Has had lesions frozen before.       Past Medical History:   Diagnosis Date    BPH (benign prostatic hyperplasia)     s/p TURP    Chronic kidney disease     DDD (degenerative disc disease) 10/21/2013    Diabetes mellitus     Diabetes mellitus with renal complications     Disc disease, degenerative, cervical     GERD (gastroesophageal reflux disease)     Gout, chronic     History of ulcerative colitis     s/p colectomy and ileostomy    HLD (hyperlipidemia)     HTN (hypertension)     Ileostomy in place 1982       Past Surgical History:   Procedure Laterality Date    CERVICAL FUSION      colectomy and ileostomy      TRANSURETHRAL RESECTION OF PROSTATE         No family history on file.    Social History     Social History    Marital status:      Spouse name: N/A    Number of children: N/A    Years of education: N/A     Occupational History    Not on file.     Social History Main Topics    Smoking status: Never Smoker     Smokeless tobacco: Never Used    Alcohol use No    Drug use: No    Sexual activity: Not on file     Other Topics Concern    Not on file     Social History Narrative    No narrative on file     SCREENINGS:   PSA from 2015 was mildly elevatd at 4.3, saw urology.  Placed on finasteride and normalized to 1.4  On 6/6/16. 2017 dx psa: 1.7   Colonoscopy, patient is status post colectomy   Bone density 2010 is normal     Immunizations  Flu shot:  Adacel 2015  PVX 9/5/14  PCV: utd 10.16     Lab Results   Component Value Date    WBC 18.97 (H) 01/17/2017    HGB 15.4 01/17/2017    HCT 44.6 01/17/2017     01/17/2017    CHOL 150 01/17/2017    TRIG 84 01/17/2017    HDL 39 (L) 01/17/2017     (H) 01/17/2017    AST 67 (H) 01/17/2017     08/04/2017    K 4.6 08/04/2017     08/04/2017    CREATININE 1.3 08/04/2017    BUN 23 08/04/2017    CO2 29 08/04/2017    TSH 0.414 01/17/2017    PSA 2.1 09/15/2014    INR 1.2 07/08/2010    HGBA1C 6.8 (H) 01/17/2017    LDLCALC 94.2 01/17/2017     (H) 08/04/2017         ROS:  GENERAL: No fever, chills, fatigability or weight loss.  SKIN: above  HEAD: No headaches.  EYES: No visual changes  EARS: No ear pain or changes in hearing.  NOSE: No congestion or rhinorrhea.  MOUTH & THROAT: No hoarseness, change in voice, or sore throat.  NODES: Denies swollen glands.  CHEST: Denies WILSON, cyanosis, wheezing, cough and sputum production.  CARDIOVASCULAR: Denies chest pain, PND, orthopnea.  ABDOMEN: No nausea, vomiting, or changes in bowel function.  URINARY: No flank pain, dysuria or hematuria.  PERIPHERAL VASCULAR: No claudication or cyanosis.  MUSCULOSKELETAL: No joint stiffness or swelling. Denies back pain.  NEUROLOGIC: No weakness or numbness.    Vital signs reviewed  PE:   APPEARANCE: Well nourished, well developed, in no acute distress.    HEAD: Normocephalic, atraumatic.  EYES: PERRL. EOMI.   Conjunctivae noninjected.  EARS: TM's intact. Light reflex normal. No  retraction or perforation  NOSE: Mucosa pink. Airway clear.  MOUTH & THROAT: No tonsillar enlargement. No pharyngeal erythema or exudate.   NECK: Supple with no cervical lymphadenopathy.  No carotid bruits, no thyromegaly  CHEST: Good inspiratory effort. Lungs clear to auscultation with no wheezes or crackles.  CARDIOVASCULAR: Normal S1, S2. No rubs, murmurs, or gallops.  ABDOMEN: Bowel sounds normal. Not distended. Soft. No tenderness.  Ileostomy in place  EXTREMITIES: No edema,.  Patient has multiple actinic keratosis noted on forearms and hands bilaterally.  Also has large hyperkeratotic lesion about 1.5-2 cm on lateral forearm on the left.  This is nontender      IMPRESSION  1. AK (actinic keratosis)    2. Skin lesion of left arm    3. Controlled type 2 diabetes mellitus with stage 3 chronic kidney disease, without long-term current use of insulin    4. Essential hypertension    5. Hyperlipidemia, unspecified hyperlipidemia type    6. History of ulcerative colitis            PLAN  Diabetes health maintenance:  -- advise regular and consistent glucose monitoring and medication compliance.  -- advise daily foot checks  -- advise yearly ophthalmologic exams  -- advise adequate dietary and exercise modification  -- advise regular dental visits  -- advise daily low-dose aspirin use if patient is not on other anticoagulants and there are no other contraindications.  -- Medication management: Continue metformin 500 twice a day.  Check A1c    Hypertension controlled.  Continue current therapy    Hyperlipidemia: Check labs    AK with large hyperkeratotic lesion as above on forearm, either large AK but concern for possible SCC.  Will refer to dermatology for evaluation, likely excision and in discussion of treatment of his numerous recurrent AK's.      Orders Placed This Encounter   Procedures    Comprehensive metabolic panel    Hemoglobin A1c    Lipid panel    CBC auto differential    Ambulatory referral to  Dermatology

## 2017-10-10 ENCOUNTER — OFFICE VISIT (OUTPATIENT)
Dept: FAMILY MEDICINE | Facility: CLINIC | Age: 67
End: 2017-10-10
Payer: COMMERCIAL

## 2017-10-10 VITALS
DIASTOLIC BLOOD PRESSURE: 87 MMHG | WEIGHT: 212.94 LBS | OXYGEN SATURATION: 96 % | SYSTOLIC BLOOD PRESSURE: 135 MMHG | HEIGHT: 74 IN | HEART RATE: 72 BPM | BODY MASS INDEX: 27.33 KG/M2

## 2017-10-10 DIAGNOSIS — I10 ESSENTIAL HYPERTENSION: ICD-10-CM

## 2017-10-10 DIAGNOSIS — N18.30 CONTROLLED TYPE 2 DIABETES MELLITUS WITH STAGE 3 CHRONIC KIDNEY DISEASE, WITHOUT LONG-TERM CURRENT USE OF INSULIN: ICD-10-CM

## 2017-10-10 DIAGNOSIS — L98.9 SKIN LESION OF LEFT ARM: ICD-10-CM

## 2017-10-10 DIAGNOSIS — E11.22 CONTROLLED TYPE 2 DIABETES MELLITUS WITH STAGE 3 CHRONIC KIDNEY DISEASE, WITHOUT LONG-TERM CURRENT USE OF INSULIN: ICD-10-CM

## 2017-10-10 DIAGNOSIS — L57.0 AK (ACTINIC KERATOSIS): Primary | ICD-10-CM

## 2017-10-10 DIAGNOSIS — Z87.19 HISTORY OF ULCERATIVE COLITIS: ICD-10-CM

## 2017-10-10 DIAGNOSIS — E78.5 HYPERLIPIDEMIA, UNSPECIFIED HYPERLIPIDEMIA TYPE: ICD-10-CM

## 2017-10-10 PROCEDURE — 99999 PR PBB SHADOW E&M-EST. PATIENT-LVL IV: CPT | Mod: PBBFAC,,, | Performed by: FAMILY MEDICINE

## 2017-10-10 PROCEDURE — 99214 OFFICE O/P EST MOD 30 MIN: CPT | Mod: S$GLB,,, | Performed by: FAMILY MEDICINE

## 2017-10-12 ENCOUNTER — INITIAL CONSULT (OUTPATIENT)
Dept: DERMATOLOGY | Facility: CLINIC | Age: 67
End: 2017-10-12
Payer: COMMERCIAL

## 2017-10-12 DIAGNOSIS — D48.5 NEOPLASM OF UNCERTAIN BEHAVIOR OF SKIN: Primary | ICD-10-CM

## 2017-10-12 DIAGNOSIS — Z85.828 HISTORY OF NONMELANOMA SKIN CANCER: ICD-10-CM

## 2017-10-12 DIAGNOSIS — L57.0 ACTINIC KERATOSES: ICD-10-CM

## 2017-10-12 PROCEDURE — 17000 DESTRUCT PREMALG LESION: CPT | Mod: S$GLB,,, | Performed by: PATHOLOGY

## 2017-10-12 PROCEDURE — 88342 IMHCHEM/IMCYTCHM 1ST ANTB: CPT | Mod: 26,,, | Performed by: PATHOLOGY

## 2017-10-12 PROCEDURE — 11101 PR BIOPSY, EACH ADDED LESION: CPT | Mod: S$GLB,,, | Performed by: PATHOLOGY

## 2017-10-12 PROCEDURE — 17003 DESTRUCT PREMALG LES 2-14: CPT | Mod: S$GLB,,, | Performed by: PATHOLOGY

## 2017-10-12 PROCEDURE — 99203 OFFICE O/P NEW LOW 30 MIN: CPT | Mod: 25,S$GLB,, | Performed by: PATHOLOGY

## 2017-10-12 PROCEDURE — 11100 PR BIOPSY OF SKIN LESION: CPT | Mod: 59,S$GLB,, | Performed by: PATHOLOGY

## 2017-10-12 PROCEDURE — 88305 TISSUE EXAM BY PATHOLOGIST: CPT | Performed by: PATHOLOGY

## 2017-10-12 PROCEDURE — 99999 PR PBB SHADOW E&M-EST. PATIENT-LVL III: CPT | Mod: PBBFAC,,, | Performed by: PATHOLOGY

## 2017-10-12 NOTE — PATIENT INSTRUCTIONS
Shave Biopsy Wound Care    Your doctor has performed a shave biopsy today.  A band aid and vaseline ointment has been placed over the site.  This should remain in place for 24 hours.  It is recommended that you keep the area dry for the first 24 hours.  After 24 hours, you may remove the band aid and wash the area with warm soap and water and apply Vaseline jelly.  Many patients prefer to use Neosporin or Bacitracin ointment.  This is acceptable; however, know that you can develop an allergy to this medication even if you have used it safely for years.  It is important to keep the area moist.  Letting it dry out and get air slows healing time, and will worsen the scar.  Band aid is optional after first 24 hours.      If you notice increasing redness, tenderness, pain, or yellow drainage at the biopsy site, please notify your doctor.  These are signs of an infection.    If your biopsy site is bleeding, apply firm pressure for 15 minutes straight.  Repeat for another 15 minutes, if it is still bleeding.   If the surgical site continues to bleed, then please contact your doctor.      [facility] [place] [address]/ [phone] FAX/ [email or web address]

## 2017-10-12 NOTE — PROGRESS NOTES
Subjective:       Patient ID:  Jarrett Charlton Jr. is a 67 y.o. male who presents for   Chief Complaint   Patient presents with    Lesion     Left, Right arm and Left ear     66 yo male with history of NMSC presents as a new patient for concerning growths on his left and right forearms. Had them frozen off by Dr. Hernández in the past, about 3 years ago, but the lesions continued to grow. Denies bleeding, tenderness, or itch of lesions. He also has a lesion on his left ear that has been frozen many times but has persisted.       Lesion  - Initial  Affected locations: left arm, right arm and left ear  Duration: 2 years  Signs / symptoms: asymptomatic  Severity: mild  Timing: constant  Aggravated by: nothing  Relieving factors/Treatments tried: cryosurgery  Improvement on treatment: no relief        Review of Systems   Constitutional: Negative for fever, chills and fatigue.   Skin: Positive for activity-related sunscreen use. Negative for daily sunscreen use.   Hematologic/Lymphatic: Bruises/bleeds easily.        Objective:    Physical Exam   Constitutional: He appears well-developed and well-nourished.   Neurological: He is alert and oriented to person, place, and time.   Psychiatric: He has a normal mood and affect.   Skin:   Areas Examined (abnormalities noted in diagram):   Scalp / Hair Palpated and Inspected  Head / Face Inspection Performed  Neck Inspection Performed  RUE Inspected  LUE Inspection Performed                   Diagram Legend     Erythematous scaling macule/papule c/w actinic keratosis       Vascular papule c/w angioma      Pigmented verrucoid papule/plaque c/w seborrheic keratosis      Yellow umbilicated papule c/w sebaceous hyperplasia      Irregularly shaped tan macule c/w lentigo     1-2 mm smooth white papules consistent with Milia      Movable subcutaneous cyst with punctum c/w epidermal inclusion cyst      Subcutaneous movable cyst c/w pilar cyst      Firm pink to brown papule c/w  dermatofibroma      Pedunculated fleshy papule(s) c/w skin tag(s)      Evenly pigmented macule c/w junctional nevus     Mildly variegated pigmented, slightly irregular-bordered macule c/w mildly atypical nevus      Flesh colored to evenly pigmented papule c/w intradermal nevus       Pink pearly papule/plaque c/w basal cell carcinoma      Erythematous hyperkeratotic cursted plaque c/w SCC      Surgical scar with no sign of skin cancer recurrence      Open and closed comedones      Inflammatory papules and pustules      Verrucoid papule consistent consistent with wart     Erythematous eczematous patches and plaques     Dystrophic onycholytic nail with subungual debris c/w onychomycosis     Umbilicated papule    Erythematous-base heme-crusted tan verrucoid plaque consistent with inflamed seborrheic keratosis     Erythematous Silvery Scaling Plaque c/w Psoriasis     See annotation      Assessment / Plan:      Pathology Orders:      Normal Orders This Visit    Tissue Specimen To Pathology, Dermatology     Questions:    Directional Terms:  Other(comment)    Right    Clinical information:  r/o SCC    Specific Site:  proximal forearm    Tissue Specimen To Pathology, Dermatology     Questions:    Directional Terms:  Other(comment)    Right    Clinical information:  r/o SCC    Specific Site:  distal forearm    Tissue Specimen To Pathology, Dermatology     Questions:    Directional Terms:  Other(comment)    Left    Clinical information:  r/o SCC    Specific Site:  proximal forearm    Tissue Specimen To Pathology, Dermatology     Questions:    Directional Terms:  Other(comment)    Left    Superior    Clinical information:  r/o adnexal neoplasm vs BCC vs other    Specific Site:  helix    Tissue Specimen To Pathology, Dermatology     Questions:    Directional Terms:  Other(comment)    Left    Clinical information:  r/o BCC    Specific Site:  neck        Neoplasm of uncertain behavior of skin  1) Left neck: most likely BCC   2)  Left superior helix: BCC vs SCC vs hypertrophic AK   3) Right distal forearm: SCC vs SCC in situ   4) Right proximal forearm: SCC vs SCC in situ   5) Left proximal forearm: SCC vs SCC in situ   Shave biopsy procedure note:    5 Shave biopsies performed after verbal consent including risk of infection, scar, recurrence, need for additional treatment of site. Area prepped with alcohol, anesthetized with approximately 6.0cc of 1% lidocaine with epinephrine. Lesional tissue shaved with razor blade. Hemostasis achieved with application of aluminum chloride followed by hyfrecation. No complications. Dressing applied. Wound care explained.    If biopsy positive for malignancy, refer to Dr. Kimball for Mohs surgery consultation.        Actinic keratoses  Diffuse AKs. Treated 3 AKs on head/neck region with cryodestruction   -Plan to treat diffuse AKs on arms with 120 minutes of PDT     Cryosurgery Procedure Note    Verbal consent from the patient is obtained and the patient is aware of the precancerous quality and need for treatment of these lesions. Liquid nitrogen cryosurgery is applied to the 3 actinic keratoses of head and neck, as detailed in the physical exam, to produce a freeze injury. The patient is aware that blisters may form and is instructed on wound care with gentle cleansing and use of vaseline ointment to keep moist until healed. The patient is supplied a handout on cryosurgery and is instructed to call if lesions do not completely resolve.           Return in about 1 month (around 11/12/2017), or if symptoms worsen or fail to improve, for PDT f/u appt.

## 2017-10-18 ENCOUNTER — TELEPHONE (OUTPATIENT)
Dept: FAMILY MEDICINE | Facility: CLINIC | Age: 67
End: 2017-10-18

## 2017-10-18 NOTE — TELEPHONE ENCOUNTER
Patient requesting a copy of his labs to be put in the mail.  Will put in the mail today to be sent to patient.

## 2017-10-18 NOTE — TELEPHONE ENCOUNTER
----- Message from Stacai Faye sent at 10/18/2017  4:10 PM CDT -----  Contact: Self/ [phone]  Patient would like to speak with you about getting a copy of his blood test results. Please advise.

## 2017-10-24 RX ORDER — COLCHICINE 0.6 MG/1
TABLET, FILM COATED ORAL
Qty: 180 TABLET | Refills: 0 | Status: SHIPPED | OUTPATIENT
Start: 2017-10-24 | End: 2017-10-31

## 2017-10-25 ENCOUNTER — TELEPHONE (OUTPATIENT)
Dept: DERMATOLOGY | Facility: CLINIC | Age: 67
End: 2017-10-25

## 2017-10-25 NOTE — TELEPHONE ENCOUNTER
----- Message from Florencia Koch MD sent at 10/25/2017  9:14 AM CDT -----  pls call pt with the following biopsy results.  Would like for Dr. Kimball to treat SCCIS left proximal forearm (cannot rule out invasive), left neck BCC, right distal forearm SCCIS (cannot rule out invasive), right proximal forearm SCCIS.  Photos in Epic.  Her staff is copied on this message.  Thanks,  bv    1. Skin, left proximal forearm, shave biopsy:  - SQUAMOUS CELL CARCINOMA IN SITU.  - THE ATYPICAL SQUAMOUS EPITHELIUM EXTENDS TO THE BASE OF THE BIOPSY, AND AN UNDERLYING INVASIVE SQUAMOUS CELL CARCINOMA CANNOT BE EXCLUDED.     2. Skin, left superior helix, shave biopsy:  - CHANGES SUGGESTIVE OF CHONDRODERMATITIS NODULARIS HELICIS.    3. Skin, left neck, shave biopsy:  - BASAL CELL CARCINOMA WITH NODULAR GROWTH PATTERN.  - THE TUMOR EXTENDS TO THE DEEP AND LATERAL BIOPSY MARGINS.    1. Skin, right distal forearm, shave biopsy:  - SQUAMOUS CELL CARCINOMA IN SITU.  - THE ATYPICAL SQUAMOUS EPITHELIUM EXTENDS TO THE BASE OF THE BIOPSY, AND AN UNDERLYING INVASIVE SQUAMOUS CELL CARCINOMA CANNOT BE EXCLUDED.     2. Skin, right proximal forearm, shave biopsy:  - SQUAMOUS CELL CARCINOMA IN SITU.  - THE TUMOR EXTENDS TO A LATERAL BIOPSY MARGIN.

## 2017-10-31 ENCOUNTER — INITIAL CONSULT (OUTPATIENT)
Dept: DERMATOLOGY | Facility: CLINIC | Age: 67
End: 2017-10-31
Payer: COMMERCIAL

## 2017-10-31 VITALS
SYSTOLIC BLOOD PRESSURE: 131 MMHG | HEART RATE: 104 BPM | BODY MASS INDEX: 27.21 KG/M2 | HEIGHT: 74 IN | DIASTOLIC BLOOD PRESSURE: 86 MMHG | WEIGHT: 212 LBS

## 2017-10-31 DIAGNOSIS — C44.621 SQUAMOUS CELL CARCINOMA OF UPPER EXTREMITY, UNSPECIFIED LATERALITY: ICD-10-CM

## 2017-10-31 DIAGNOSIS — C44.41 BASAL CELL CARCINOMA OF LEFT SIDE OF NECK: Primary | ICD-10-CM

## 2017-10-31 PROCEDURE — 99214 OFFICE O/P EST MOD 30 MIN: CPT | Mod: S$GLB,,, | Performed by: DERMATOLOGY

## 2017-10-31 PROCEDURE — 99999 PR PBB SHADOW E&M-EST. PATIENT-LVL III: CPT | Mod: PBBFAC,,, | Performed by: DERMATOLOGY

## 2017-10-31 NOTE — PROGRESS NOTES
REFERRING MD:  Florencia Koch M.D.    CHIEF COMPLAINT:  New patient being consulted for Mohs' surgery evaluation.    HISTORY OF PRESENT ILLNESS:  67 y.o. male presents with a 2 year(s) history of growths on the R distal forearm, R proximal forearm, and L proximal forearm, and a 1 year(s) history of growth on the L neck.    Negative for scabbing.  Negative for crusting.  Negative for bleeding.  Negative for itching.    Biopsies of R distal forearm, R proximal forearm, and L proximal forearm consistent with squamous cell carcinoma in situ.   Biopsy of L neck consistent with basal cell carcinoma.    No prior treatment to any of these sites.    Pacemaker: No  Defibrillator: No  Artificial joints: No  Artificial heart valves: No    PAST MEDICAL HISTORY:  Past Medical History:   Diagnosis Date    Basal cell carcinoma     BPH (benign prostatic hyperplasia)     s/p TURP    Chronic kidney disease     DDD (degenerative disc disease) 10/21/2013    Diabetes mellitus     Diabetes mellitus with renal complications     Disc disease, degenerative, cervical     GERD (gastroesophageal reflux disease)     Gout, chronic     History of ulcerative colitis     s/p colectomy and ileostomy    HLD (hyperlipidemia)     HTN (hypertension)     Ileostomy in place 1982    Squamous cell carcinoma        PAST SURGICAL HISTORY:  Past Surgical History:   Procedure Laterality Date    CERVICAL FUSION      colectomy and ileostomy      TRANSURETHRAL RESECTION OF PROSTATE          SOCIAL HISTORY:  Dependencies:  never smoked    PERTINENT MEDICATIONS:  See medications list.  aspirin    ALLERGIES:  Patient has no known allergies.    ROS:  Skin: See HPI  Constitutional: No fatigue, fever, malaise, weight loss, or night sweats.  Cardiovascular: No chest pain, palpitations, or edema.  Respiratory: No coughing, wheezing, SOB, or sputum production.    Physical Exam   HENT:   Head:       Skin:                 R proximal forearm      R distal  forearm      L proximal forearm      L neck    General: Mood and affect normal. Alert and orient X3. Normal appearance.  Neck: L neck with a 10 x 10 mm pink pearly plaque located 4 cm inferiorly from the left inferior lobe attachment and 6 cm posteriorly.   RUE: R proximal forearm with a 20 x 25 mm pink scaly plaque located 19 cm proximally from the right ulnar base of wrist and 1 cm radially.  R distal forearm with an 11 x 11 mm pink scaly plaque located 3.5 cm proximally from the right radial base of wrist and 1.5 cm ulnarly.   LUE: L proximal forearm with a 17 x 20 mm pink nodular plaque located 21 cm proximally from the left ulnar base of wrist and 3.5 cm radially.   Bilateral upper extremities with diffuse scaly erythematous plaques.    IMPRESSION:  Biopsy proven squamous cell carcinoma in situ, R distal forearm and R proximal forearm, path# [id number].  Biopsy proven nodular basal cell carcinoma- L neck, and squamous cell carcinoma in situ- L proximal forearm, path# [id number].    PLAN:  The diagnosis and the pathology report were discussed in detail with the patient. Treatment options were reviewed, including Mohs Micrographic Surgery, radiation, topical therapy, and standard excision.  After careful review of patient's history and physical exam, and after discussion of treatment options, the decision was made to perform Mohs micrographic surgery.    Scheduled patient for Mohs Micrographic Surgery.- for the left neck, then on another day for the left forearm, and then on another day for the R forearm x2. Risks, benefits, and alternatives of Mohs' surgery discussed with the patient. Discussed repair options including complex closure, skin flap, skin graft and second intention healing with the patient. Pre-operative instructions provided to the patient. Okay to stay on aspirin.    Spent 30 minutes in coordination of care and/or consultation with patient discussing diagnosis, treatment options, risks and  benefits of each. All questions answered.

## 2017-11-06 ENCOUNTER — PROCEDURE VISIT (OUTPATIENT)
Dept: DERMATOLOGY | Facility: CLINIC | Age: 67
End: 2017-11-06
Payer: COMMERCIAL

## 2017-11-06 VITALS
HEIGHT: 74 IN | SYSTOLIC BLOOD PRESSURE: 134 MMHG | HEART RATE: 73 BPM | DIASTOLIC BLOOD PRESSURE: 90 MMHG | BODY MASS INDEX: 26.31 KG/M2 | WEIGHT: 205 LBS

## 2017-11-06 DIAGNOSIS — C44.41 BASAL CELL CARCINOMA OF LEFT SIDE OF NECK: Primary | ICD-10-CM

## 2017-11-06 PROCEDURE — 99499 UNLISTED E&M SERVICE: CPT | Mod: S$GLB,,, | Performed by: DERMATOLOGY

## 2017-11-06 PROCEDURE — 13132 CMPLX RPR F/C/C/M/N/AX/G/H/F: CPT | Mod: 51,S$GLB,, | Performed by: DERMATOLOGY

## 2017-11-06 PROCEDURE — 17312 MOHS ADDL STAGE: CPT | Mod: S$GLB,,, | Performed by: DERMATOLOGY

## 2017-11-06 PROCEDURE — 17311 MOHS 1 STAGE H/N/HF/G: CPT | Mod: S$GLB,,, | Performed by: DERMATOLOGY

## 2017-11-06 RX ORDER — OXYCODONE AND ACETAMINOPHEN 5; 325 MG/1; MG/1
1 TABLET ORAL
Qty: 20 TABLET | Refills: 0 | Status: ON HOLD | OUTPATIENT
Start: 2017-11-06 | End: 2017-11-10

## 2017-11-06 NOTE — PROGRESS NOTES
PROCEDURE: Mohs' Micrographic Surgery    INDICATION: Biopsy-proven skin cancer of cosmetically and functionally important areas, including head, neck, genital, hand, foot, or areas known for having difficulty in healing, such as the lower anterior legs. Tumor with ill-defined borders.    REFERRING MD: Florencia Koch M.D.    CASE NUMBER:     ANESTHETIC: 6 cc 0.5% Lidocaine with Epi 1:200,000 mixed 1:1 with 0.5% Bupivacaine    SURGICAL PREP: Hibiclens    SURGEON: Maurice Kimball MD    ASSISTANTS: Ibis Rodriguez PA-C and Kayley Barahona, Surg Tech    PREOPERATIVE DIAGNOSIS: basal cell carcinoma    POSTOPERATIVE DIAGNOSIS: basal cell carcinoma    PATHOLOGIC DIAGNOSIS: basal cell carcinoma- nodular, infiltrating, superficial    HISTOLOGY OF SPECIMENS IN FIRST STAGE:   Tumor Type: Tumor seen. Superficial basal cell carcinoma: Foci of basaloid cells with peripheral palisading and focal retraction artifact arising along the dermoepidermal junction and extending into the papillary dermis.  Nodular basal cell carcinoma: Nodular tumor in dermis composed of basaloid cells exhibiting peripheral palisading and retraction artifact.  Infiltrative basal cell carcinoma: Basaloid tumor in dermis characterized by thin elongated strands of basaloid cells infiltrating between dermal collagen bundles.   Depth of Invasion: epidermis and dermis  Perineural Invasion: No    HISTOLOGY OF SPECIMENS IN SUBSEQUENT STAGES:  · Tumor Type: No tumor seen.    STAGES OF MOHS' SURGERY PERFORMED: 2    TUMOR-FREE PLANE ACHIEVED: Yes    HEMOSTASIS: electrocoagulation     SPECIMENS: 5 (2 in stage A and 3 in stage B)    LOCATION: left neck. Patient verified location.    INITIAL LESION SIZE: 0.5 x 0.8 cm    FINAL DEFECT SIZE: 1.7 x 1.9 cm    WOUND REPAIR/DISPOSITION: The patient tolerated Mohs' Micrographic Surgery for a basal cell carcinoma very well. When the tumor was completely removed, a repair of the surgical defect was undertaken.      PROCEDURE:  "Complex Linear Repair    INDICATION: Status post Mohs' Micrographic Surgery for basal cell carcinoma.    CASE NUMBER:     SURGEON: Maurice Kimball MD    ASSISTANTS: Ibis Rodriguez PA-C and Kayley Barahona Surg Tech    ANESTHETIC: 4.5 cc 1% Lidocaine with Epinephrine 1:100,000    SURGICAL PREP: Hibiclens    LOCATION: left neck    DEFECT SIZE: 1.7 x 1.9 cm    WOUND REPAIR/DISPOSITION:  After the patient's carcinoma had been completely removed with Mohs' Micrographic Surgery, a repair of the surgical defect was undertaken. The patient was returned to the operating suite where the area of left neck was prepped, draped, and anesthetized in the usual sterile fashion. The wound was widely undermined in all directions. Then, electrocoagulation was used to obtain meticulous hemostasis. 4-0 Vicryl buried vertical mattress sutures were placed into the subcutaneous and dermal plane to close the wound and danial the cutaneous wound edge. Bilateral dog ears were identified and were removed by a standard Burow's triangle technique. The cutaneous wound edges were closed using interrupted 4-0 Prolene suture.    The patient tolerated the procedure well.    The area was cleaned and dressed appropriately and the patient was given wound care instructions, as well as appointment for follow-up evaluation. Patient was placed on Percocet 5 prn postop pain.    LENGTH OF REPAIR: 3.8 cm    Vitals:    11/06/17 1116 11/06/17 1337   BP: (!) 163/105 (!) 134/90   BP Location: Left arm Left arm   Patient Position: Sitting Sitting   BP Method: Small (Automatic) Small (Automatic)   Pulse: 95 73   Weight: 93 kg (205 lb)    Height: 6' 2" (1.88 m)          "

## 2017-11-10 PROBLEM — I25.10 CAD (CORONARY ARTERY DISEASE): Status: ACTIVE | Noted: 2017-11-10

## 2017-11-11 PROBLEM — Z95.828 PRESENCE OF ARTERIAL STENT: Status: ACTIVE | Noted: 2017-11-11

## 2017-11-13 ENCOUNTER — PROCEDURE VISIT (OUTPATIENT)
Dept: DERMATOLOGY | Facility: CLINIC | Age: 67
End: 2017-11-13
Payer: MEDICARE

## 2017-11-13 VITALS
DIASTOLIC BLOOD PRESSURE: 92 MMHG | SYSTOLIC BLOOD PRESSURE: 156 MMHG | BODY MASS INDEX: 26.56 KG/M2 | HEART RATE: 71 BPM | HEIGHT: 74 IN | WEIGHT: 207 LBS

## 2017-11-13 DIAGNOSIS — C44.629 SQUAMOUS CELL CARCINOMA OF ARM, LEFT: Primary | ICD-10-CM

## 2017-11-13 PROCEDURE — 13121 CMPLX RPR S/A/L 2.6-7.5 CM: CPT | Mod: PBBFAC | Performed by: DERMATOLOGY

## 2017-11-13 PROCEDURE — 99499 UNLISTED E&M SERVICE: CPT | Mod: S$GLB,,, | Performed by: DERMATOLOGY

## 2017-11-13 PROCEDURE — 17313 MOHS 1 STAGE T/A/L: CPT | Mod: 79,PBBFAC | Performed by: DERMATOLOGY

## 2017-11-13 PROCEDURE — 13121 CMPLX RPR S/A/L 2.6-7.5 CM: CPT | Mod: 79,S$GLB,, | Performed by: DERMATOLOGY

## 2017-11-13 PROCEDURE — 17313 MOHS 1 STAGE T/A/L: CPT | Mod: 79,S$GLB,, | Performed by: DERMATOLOGY

## 2017-11-13 RX ORDER — CEPHALEXIN 500 MG/1
500 CAPSULE ORAL 3 TIMES DAILY
Qty: 30 CAPSULE | Refills: 0 | Status: SHIPPED | OUTPATIENT
Start: 2017-11-13 | End: 2017-11-23

## 2017-11-13 NOTE — PROGRESS NOTES
PROCEDURE: Mohs' Micrographic Surgery    INDICATION: Tumors with aggressive clinical behavior (rapidly growing, greater than 1 cm in diameter). Tumor with ill-defined borders. Tumor size >2 cm on the trunk or extremities.    REFERRING MD: Florencia Koch M.D.    CASE NUMBER:     ANESTHETIC: 11 cc 0.5% Lidocaine with Epi 1:200,000 mixed 1:1 with 0.5% Bupivacaine    SURGICAL PREP: Hibiclens    SURGEON: Maurice Kimball MD    ASSISTANTS: Ibis Rodriguez PA-C and Mia New, Surg Tech    PREOPERATIVE DIAGNOSIS: squamous cell carcinoma in situ    POSTOPERATIVE DIAGNOSIS: squamous cell carcinoma in situ    PATHOLOGIC DIAGNOSIS: squamous cell carcinoma in situ    HISTOLOGY OF SPECIMENS IN FIRST STAGE:   Tumor Type: No tumor seen.    STAGES OF MOHS' SURGERY PERFORMED: 1    TUMOR-FREE PLANE ACHIEVED: Yes    HEMOSTASIS: electrocoagulation     SPECIMENS: 5     LOCATION: left proximal forearm. Patient verified location.    INITIAL LESION SIZE: 1.6 x 2.5 cm    FINAL DEFECT SIZE: 2.5 x 3.1 cm    WOUND REPAIR/DISPOSITION: The patient tolerated Mohs' Micrographic Surgery for a basal cell carcinoma very well. When the tumor was completely removed, a repair of the surgical defect was undertaken.      PROCEDURE: Complex Linear Repair    INDICATION: Status post Mohs' Micrographic Surgery for basal cell carcinoma.    CASE NUMBER:     SURGEON: Maurice Kimball MD    ASSISTANTS: Ibis Rodriguez PA-C and Mia New Surg Tech    ANESTHETIC: 6 cc 1% Lidocaine with Epinephrine 1:100,000    SURGICAL PREP: Hibiclens    LOCATION: left proximal forearm    DEFECT SIZE: 2.5 x 3.1 cm    WOUND REPAIR/DISPOSITION:  After the patient's carcinoma had been completely removed with Mohs' Micrographic Surgery, a repair of the surgical defect was undertaken. The patient was returned to the operating suite where the area of left proximal forearm was prepped, draped, and anesthetized in the usual sterile fashion. The wound was widely undermined in all  "directions. Then, electrocoagulation was used to obtain meticulous hemostasis. 4-0 Vicryl buried vertical mattress sutures were placed into the subcutaneous and dermal plane to close the wound and danial the cutaneous wound edge. Bilateral dog ears were identified and were removed by a standard Burow's triangle technique. The cutaneous wound edges were closed using interrupted 4-0 Prolene suture.    The patient tolerated the procedure well.    The area was cleaned and dressed appropriately and the patient was given wound care instructions, as well as appointment for follow-up evaluation. Pt already has Percocet 5 prn postop pain and was placed on Keflex 500 mg TID x 10 days.    LENGTH OF REPAIR: 5.5 cm    Vitals:    11/13/17 1106 11/13/17 1334   BP: (!) 141/91 (!) 156/92   BP Location: Left arm Right arm   Patient Position: Sitting Sitting   BP Method: Medium (Automatic) Medium (Automatic)   Pulse: 77 71   Weight: 93.9 kg (207 lb)    Height: 6' 2" (1.88 m)          "

## 2017-11-13 NOTE — PROGRESS NOTES
67 y.o. male patient is here for suture removal following Mohs' surgery.    Patient reports no problems.    WOUND PE:  The left neck sutures intact. Wound healing well. Good skin edges. No signs or symptoms of infection.    IMPRESSION:  Healing operative site from Mohs' surgery BCC, left neck s/p Mohs with CLC, postop day # 7.    PLAN:  Sutures removed today. Steri-strips applied.  Continue wound care.  Keep moist with Aquaphor.    RTC:  In 2 weeks.

## 2017-11-27 ENCOUNTER — PROCEDURE VISIT (OUTPATIENT)
Dept: DERMATOLOGY | Facility: CLINIC | Age: 67
End: 2017-11-27
Payer: COMMERCIAL

## 2017-11-27 VITALS
HEART RATE: 80 BPM | DIASTOLIC BLOOD PRESSURE: 98 MMHG | WEIGHT: 207 LBS | BODY MASS INDEX: 26.56 KG/M2 | HEIGHT: 74 IN | SYSTOLIC BLOOD PRESSURE: 160 MMHG

## 2017-11-27 DIAGNOSIS — D04.61 SQUAMOUS CELL CARCINOMA IN SITU OF SKIN OF RIGHT FOREARM: Primary | ICD-10-CM

## 2017-11-27 PROCEDURE — 13122 CMPLX RPR S/A/L ADDL 5 CM/>: CPT | Mod: S$GLB,,, | Performed by: DERMATOLOGY

## 2017-11-27 PROCEDURE — 17313 MOHS 1 STAGE T/A/L: CPT | Mod: S$GLB,,, | Performed by: DERMATOLOGY

## 2017-11-27 PROCEDURE — 99499 UNLISTED E&M SERVICE: CPT | Mod: S$GLB,,, | Performed by: DERMATOLOGY

## 2017-11-27 PROCEDURE — 13121 CMPLX RPR S/A/L 2.6-7.5 CM: CPT | Mod: S$GLB,,, | Performed by: DERMATOLOGY

## 2017-11-27 PROCEDURE — 17314 MOHS ADDL STAGE T/A/L: CPT | Mod: S$GLB,,, | Performed by: DERMATOLOGY

## 2017-11-27 RX ORDER — OXYCODONE AND ACETAMINOPHEN 5; 325 MG/1; MG/1
1 TABLET ORAL
Qty: 20 TABLET | Refills: 0 | Status: SHIPPED | OUTPATIENT
Start: 2017-11-27 | End: 2017-12-05

## 2017-11-27 RX ORDER — CEPHALEXIN 500 MG/1
500 CAPSULE ORAL 3 TIMES DAILY
Qty: 21 CAPSULE | Refills: 0 | Status: ON HOLD | OUTPATIENT
Start: 2017-11-27 | End: 2017-12-07

## 2017-11-27 NOTE — PROGRESS NOTES
PROCEDURE: Mohs' Micrographic Surgery    INDICATION: Tumor with ill-defined borders.    REFERRING MD: Florencia Koch M.D.    CASE NUMBER:     ANESTHETIC: 4 cc 0.5% Lidocaine with Epi 1:200,000 mixed 1:1 with 0.5% Bupivacaine    SURGICAL PREP: Hibiclens    SURGEON: Maurice Kimball MD    ASSISTANTS: Ibis Rodriguez PA-C, Kayley Barahona, Surg Tech and Mia New, Surg Tech    PREOPERATIVE DIAGNOSIS: squamous cell carcinoma in situ    POSTOPERATIVE DIAGNOSIS: squamous cell carcinoma in situ    PATHOLOGIC DIAGNOSIS: squamous cell carcinoma in situ; nodular basal cell carcinoma    HISTOLOGY OF SPECIMENS IN FIRST STAGE:   Tumor Type: Tumor seen. Nodular basal cell carcinoma: Nodular tumor in dermis composed of basaloid cells exhibiting peripheral palisading and retraction artifact.   Depth of Invasion: subcutaneous tissue  Perineural Invasion: No    HISTOLOGY OF SPECIMENS IN SUBSEQUENT STAGES:  · Tumor Type: No tumor seen.    STAGES OF MOHS' SURGERY PERFORMED: 2    TUMOR-FREE PLANE ACHIEVED: Yes    HEMOSTASIS: electrocoagulation     SPECIMENS: 3 (2 in stage A and 1 in stage B)    LOCATION: right distal forearm. Patient verified location.    INITIAL LESION SIZE: 0.6 x 0.9 cm    FINAL DEFECT SIZE: 1.1 x 1.3 cm    WOUND REPAIR/DISPOSITION: The patient tolerated Mohs' Micrographic Surgery for a squamous cell carcinoma in situ very well. When the tumor was completely removed, a repair of the surgical defect was undertaken.      PROCEDURE: Complex Linear Repair    INDICATION: Status post Mohs' Micrographic Surgery for squamous cell carcinoma in situ.    CASE NUMBER:     SURGEON: Maurice Kimball MD    ASSISTANTS: Ibis Rodriguez PA-C and Mia New, Surg Tech    ANESTHETIC: 2.5 cc 1% Lidocaine with Epinephrine 1:100,000    SURGICAL PREP: Hibiclens    LOCATION: right distal forearm    DEFECT SIZE: 1.1 x 1.3 cm    WOUND REPAIR/DISPOSITION:  After the patient's carcinoma had been completely removed with Mohs' Micrographic  "Surgery, a repair of the surgical defect was undertaken. The patient was returned to the operating suite where the area of right distal forearm was prepped, draped, and anesthetized in the usual sterile fashion. The wound was widely undermined in all directions. Then, electrocoagulation was used to obtain meticulous hemostasis. 4-0 Vicryl buried vertical mattress sutures were placed into the subcutaneous and dermal plane to close the wound and danial the cutaneous wound edge. Bilateral dog ears were identified and were removed by a standard Burow's triangle technique. The cutaneous wound edges were closed using interrupted 4-0 Prolene suture.    The patient tolerated the procedure well.    The area was cleaned and dressed appropriately and the patient was given wound care instructions, as well as appointment for follow-up evaluation. Patient was placed on Percocet 5 prn postop pain and Keflex 500 mg TID x 7 days.    LENGTH OF REPAIR: 3 cm    Vitals:    11/27/17 1047 11/27/17 1435   BP: (!) 141/97 (!) 160/98   BP Location: Right arm Right arm   Patient Position: Sitting Sitting   BP Method: Medium (Automatic) Medium (Manual)   Pulse: 84 80   Weight: 93.9 kg (207 lb)    Height: 6' 2" (1.88 m)          PROCEDURE: Mohs' Micrographic Surgery    INDICATION: Tumors with aggressive clinical behavior (rapidly growing, greater than 1 cm in diameter). Tumor with ill-defined borders.    REFERRING MD: Florencia Koch M.D.    CASE NUMBER:     ANESTHETIC: 11 cc 0.5% Lidocaine with Epi 1:200,000 mixed 1:1 with 0.5% Bupivacaine    SURGICAL PREP: Hibiclens    SURGEON: Maurice Kimball MD    ASSISTANTS: Ibis Rodriguez PA-C, Kayley Barahona, Surg Tech and Mia New, Surg Tech    PREOPERATIVE DIAGNOSIS: squamous cell carcinoma in situ    POSTOPERATIVE DIAGNOSIS: squamous cell carcinoma in situ    PATHOLOGIC DIAGNOSIS: squamous cell carcinoma in situ    HISTOLOGY OF SPECIMENS IN FIRST STAGE:   Tumor Type: Tumor seen. Squamous cell " carcinoma in situ: Epidermis with full-thickness atypia and variable epidermal maturation.  Epidermal margin missing.   Depth of Invasion: epidermis  Perineural Invasion: No    HISTOLOGY OF SPECIMENS IN SUBSEQUENT STAGES:  · Tumor Type: No tumor seen.    STAGES OF MOHS' SURGERY PERFORMED: 2    TUMOR-FREE PLANE ACHIEVED: Yes    HEMOSTASIS: electrocoagulation     SPECIMENS: 8 (5 in stage A and 3 in stage B)    LOCATION: right proximal forearm. Patient verified location.    INITIAL LESION SIZE: 1.9 x 3.1 cm    FINAL DEFECT SIZE: 3.4 x 3.5 cm    WOUND REPAIR/DISPOSITION: The patient tolerated Mohs' Micrographic Surgery for a squamous cell carcinoma in situ very well. When the tumor was completely removed, a repair of the surgical defect was undertaken.      PROCEDURE: Complex Linear Repair    INDICATION: Status post Mohs' Micrographic Surgery for squamous cell carcinoma in situ.    CASE NUMBER:     SURGEON: Maurice Kimball MD    ASSISTANTS: Ibis Rodriguez PA-C and Mia New, Surg Tech    ANESTHETIC: 6.5 cc 1% Lidocaine with Epinephrine 1:100,000    SURGICAL PREP: Hibiclens    LOCATION: right proximal forearm    DEFECT SIZE: 3.4 x 3.5 cm    WOUND REPAIR/DISPOSITION:  After the patient's carcinoma had been completely removed with Mohs' Micrographic Surgery, a repair of the surgical defect was undertaken. The patient was returned to the operating suite where the area of right proximal forearm was prepped, draped, and anesthetized in the usual sterile fashion. The wound was widely undermined in all directions. Then, electrocoagulation was used to obtain meticulous hemostasis. 3-0 Vicryl buried vertical mattress sutures were placed into the subcutaneous and dermal plane to close the wound and danial the cutaneous wound edge. Bilateral dog ears were identified and were removed by a standard Burow's triangle technique. The cutaneous wound edges were closed using interrupted 4-0 Prolene suture.    The patient tolerated the  "procedure well.    The area was cleaned and dressed appropriately and the patient was given wound care instructions, as well as appointment for follow-up evaluation. Patient was placed on Percocet 5 prn postop pain. And Keflex 500 mg TID x 7 days.    LENGTH OF REPAIR: 8 cm    Vitals:    11/27/17 1047 11/27/17 1435   BP: (!) 141/97 (!) 160/98   BP Location: Right arm Right arm   Patient Position: Sitting Sitting   BP Method: Medium (Automatic) Medium (Manual)   Pulse: 84 80   Weight: 93.9 kg (207 lb)    Height: 6' 2" (1.88 m)          "

## 2017-11-27 NOTE — PROGRESS NOTES
67 y.o. male patient is here for suture removal following Mohs' surgery.    Patient reports no problems.    WOUND PE:  The left proximal forearm sutures intact. Wound healing well. Good skin edges. No signs or symptoms of infection.    IMPRESSION:  Healing operative site from Mohs' surgery SCC, left proximal forearm s/p Mohs with CLC, postop day # 14.    PLAN:  Sutures removed today. Steri-strips applied.  Continue wound care.  Keep moist with Aquaphor.    RTC:  In 2 weeks.

## 2017-11-28 ENCOUNTER — TELEPHONE (OUTPATIENT)
Dept: DERMATOLOGY | Facility: CLINIC | Age: 67
End: 2017-11-28

## 2017-11-28 NOTE — TELEPHONE ENCOUNTER
----- Message from Alice Ayala sent at 11/28/2017  3:17 PM CST -----  Contact: pt at [phone]  Rehana pt-Pt needs to change his s/r appt.

## 2017-12-05 PROBLEM — N17.9 ACUTE KIDNEY INJURY: Status: ACTIVE | Noted: 2017-12-05

## 2017-12-06 PROBLEM — R79.89 ABNORMAL LFTS: Status: ACTIVE | Noted: 2017-12-06

## 2017-12-06 PROBLEM — I95.1 ORTHOSTATIC HYPOTENSION: Status: ACTIVE | Noted: 2017-12-06

## 2017-12-06 PROBLEM — K52.9 GASTROENTERITIS: Status: ACTIVE | Noted: 2017-12-06

## 2017-12-07 PROBLEM — K52.9 GASTROENTERITIS: Status: RESOLVED | Noted: 2017-12-06 | Resolved: 2017-12-07

## 2017-12-07 PROBLEM — I95.1 ORTHOSTATIC HYPOTENSION: Status: RESOLVED | Noted: 2017-12-06 | Resolved: 2017-12-07

## 2017-12-07 PROBLEM — N17.9 ACUTE KIDNEY INJURY: Status: RESOLVED | Noted: 2017-12-05 | Resolved: 2017-12-07

## 2017-12-11 ENCOUNTER — TELEPHONE (OUTPATIENT)
Dept: FAMILY MEDICINE | Facility: CLINIC | Age: 67
End: 2017-12-11

## 2017-12-11 NOTE — TELEPHONE ENCOUNTER
----- Message from Phani Bajwa sent at 12/11/2017  2:10 PM CST -----  Contact: [phone] self  Patient would like to be seen sooner than the next available appointment for a hospital follow up. Patient would like to be seen on 12/18 if possible. Please call and advise.

## 2017-12-12 ENCOUNTER — OFFICE VISIT (OUTPATIENT)
Dept: DERMATOLOGY | Facility: CLINIC | Age: 67
End: 2017-12-12
Payer: COMMERCIAL

## 2017-12-12 ENCOUNTER — OFFICE VISIT (OUTPATIENT)
Dept: RHEUMATOLOGY | Facility: CLINIC | Age: 67
End: 2017-12-12
Payer: COMMERCIAL

## 2017-12-12 ENCOUNTER — LAB VISIT (OUTPATIENT)
Dept: LAB | Facility: HOSPITAL | Age: 67
End: 2017-12-12
Attending: INTERNAL MEDICINE
Payer: COMMERCIAL

## 2017-12-12 VITALS
WEIGHT: 207 LBS | HEART RATE: 65 BPM | BODY MASS INDEX: 26.56 KG/M2 | SYSTOLIC BLOOD PRESSURE: 154 MMHG | DIASTOLIC BLOOD PRESSURE: 96 MMHG | HEIGHT: 74 IN

## 2017-12-12 DIAGNOSIS — M1A.09X1 IDIOPATHIC CHRONIC GOUT OF MULTIPLE SITES WITH TOPHUS: ICD-10-CM

## 2017-12-12 DIAGNOSIS — Z09 POSTOP CHECK: Primary | ICD-10-CM

## 2017-12-12 DIAGNOSIS — M1A.09X1 IDIOPATHIC CHRONIC GOUT OF MULTIPLE SITES WITH TOPHUS: Primary | ICD-10-CM

## 2017-12-12 LAB — URATE SERPL-MCNC: 7.1 MG/DL

## 2017-12-12 PROCEDURE — 36415 COLL VENOUS BLD VENIPUNCTURE: CPT

## 2017-12-12 PROCEDURE — 99024 POSTOP FOLLOW-UP VISIT: CPT | Mod: S$GLB,,, | Performed by: DERMATOLOGY

## 2017-12-12 PROCEDURE — 99999 PR PBB SHADOW E&M-EST. PATIENT-LVL III: CPT | Mod: PBBFAC,,, | Performed by: DERMATOLOGY

## 2017-12-12 PROCEDURE — 99213 OFFICE O/P EST LOW 20 MIN: CPT | Mod: S$GLB,,, | Performed by: INTERNAL MEDICINE

## 2017-12-12 PROCEDURE — 84550 ASSAY OF BLOOD/URIC ACID: CPT

## 2017-12-12 PROCEDURE — 99999 PR PBB SHADOW E&M-EST. PATIENT-LVL III: CPT | Mod: PBBFAC,,, | Performed by: INTERNAL MEDICINE

## 2017-12-12 RX ORDER — ALLOPURINOL 300 MG/1
450 TABLET ORAL DAILY
Qty: 135 TABLET | Refills: 3 | Status: SHIPPED | OUTPATIENT
Start: 2017-12-12 | End: 2018-03-12

## 2017-12-13 ENCOUNTER — TELEPHONE (OUTPATIENT)
Dept: RHEUMATOLOGY | Facility: CLINIC | Age: 67
End: 2017-12-13

## 2017-12-13 NOTE — TELEPHONE ENCOUNTER
"Notified pt of lab results, "Uric acid level is up slightly but not high enough to increase the allopurinol." Pt verbalized understanding  "

## 2017-12-17 NOTE — PROGRESS NOTES
History of present illness: 67-year-old gentleman with ulcerative colitis.  He was recently hospitalized for dehydration.  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  He has had no recent gout attacks.  He remains on allopurinol 450 mg daily.  He still continues to take colchicine.    Physical examination:  Musculoskeletal: He has no palpable tophi.  He has bilateral bunion deformity.  He has no synovitis.    Assessment: Intercritical gout    Plans:  1.  Review laboratory studies  2.  Continue allopurinol as before  3.  He can discontinue colchicine  4.  Return to see me in 12 months

## 2017-12-20 NOTE — TELEPHONE ENCOUNTER
----- Message from Jess Silverio sent at 12/20/2017 12:41 PM CST -----  Contact: Patient  Jarrett, patient [phone], Calling for refill on Rx finasteride (PROSCAR) 5 mg tablet.  Please advise. Thanks.      [facility] - [place], [place] - [address]   [address] [facility] [address]  Phone: [phone] Fax: [phone]

## 2017-12-21 RX ORDER — FINASTERIDE 5 MG/1
5 TABLET, FILM COATED ORAL DAILY
Qty: 90 TABLET | Refills: 3 | Status: SHIPPED | OUTPATIENT
Start: 2017-12-21 | End: 2018-11-19 | Stop reason: SDUPTHER

## 2018-01-04 ENCOUNTER — TELEPHONE (OUTPATIENT)
Dept: DERMATOLOGY | Facility: CLINIC | Age: 68
End: 2018-01-04

## 2018-01-22 RX ORDER — PREDNISONE 5 MG/1
TABLET ORAL
Qty: 30 TABLET | Refills: 2 | Status: SHIPPED | OUTPATIENT
Start: 2018-01-22 | End: 2018-01-29

## 2018-01-22 RX ORDER — COLCHICINE 0.6 MG/1
0.6 TABLET ORAL DAILY
Qty: 90 TABLET | Refills: 2 | Status: SHIPPED | OUTPATIENT
Start: 2018-01-22 | End: 2018-01-24 | Stop reason: SDUPTHER

## 2018-01-24 RX ORDER — COLCHICINE 0.6 MG/1
TABLET, FILM COATED ORAL
Qty: 180 TABLET | Refills: 1 | Status: SHIPPED | OUTPATIENT
Start: 2018-01-24 | End: 2018-05-23

## 2018-01-29 ENCOUNTER — OFFICE VISIT (OUTPATIENT)
Dept: PRIMARY CARE CLINIC | Facility: CLINIC | Age: 68
End: 2018-01-29
Payer: MEDICARE

## 2018-01-29 VITALS
RESPIRATION RATE: 18 BRPM | OXYGEN SATURATION: 97 % | SYSTOLIC BLOOD PRESSURE: 116 MMHG | TEMPERATURE: 98 F | BODY MASS INDEX: 26.52 KG/M2 | HEIGHT: 74 IN | DIASTOLIC BLOOD PRESSURE: 85 MMHG | HEART RATE: 126 BPM | WEIGHT: 206.63 LBS

## 2018-01-29 DIAGNOSIS — J01.90 ACUTE NON-RECURRENT SINUSITIS, UNSPECIFIED LOCATION: Primary | ICD-10-CM

## 2018-01-29 DIAGNOSIS — K52.9 GASTROENTERITIS: ICD-10-CM

## 2018-01-29 DIAGNOSIS — R00.0 TACHYCARDIA: ICD-10-CM

## 2018-01-29 PROCEDURE — 99999 PR PBB SHADOW E&M-EST. PATIENT-LVL IV: CPT | Mod: PBBFAC,,, | Performed by: INTERNAL MEDICINE

## 2018-01-29 PROCEDURE — 99213 OFFICE O/P EST LOW 20 MIN: CPT | Mod: 25,S$GLB,, | Performed by: INTERNAL MEDICINE

## 2018-01-29 PROCEDURE — 96372 THER/PROPH/DIAG INJ SC/IM: CPT | Mod: S$GLB,,, | Performed by: INTERNAL MEDICINE

## 2018-01-29 RX ORDER — PRAVASTATIN SODIUM 40 MG/1
TABLET ORAL
COMMUNITY
Start: 2017-12-20 | End: 2018-05-04 | Stop reason: SDUPTHER

## 2018-01-29 RX ORDER — BETAMETHASONE SODIUM PHOSPHATE AND BETAMETHASONE ACETATE 3; 3 MG/ML; MG/ML
12 INJECTION, SUSPENSION INTRA-ARTICULAR; INTRALESIONAL; INTRAMUSCULAR; SOFT TISSUE
Status: COMPLETED | OUTPATIENT
Start: 2018-01-29 | End: 2018-01-29

## 2018-01-29 RX ORDER — AZITHROMYCIN 250 MG/1
TABLET, FILM COATED ORAL
Qty: 6 TABLET | Refills: 0 | Status: SHIPPED | OUTPATIENT
Start: 2018-01-29 | End: 2018-02-03

## 2018-01-29 RX ORDER — LINCOMYCIN HYDROCHLORIDE 300 MG/ML
600 INJECTION, SOLUTION INTRAMUSCULAR; INTRAVENOUS; SUBCONJUNCTIVAL
Status: COMPLETED | OUTPATIENT
Start: 2018-01-29 | End: 2018-01-29

## 2018-01-29 RX ORDER — HYDROCHLOROTHIAZIDE 25 MG/1
TABLET ORAL
COMMUNITY
Start: 2018-01-19 | End: 2018-01-29

## 2018-01-29 RX ORDER — CETIRIZINE HYDROCHLORIDE 10 MG/1
10 TABLET ORAL DAILY
Qty: 30 TABLET | Refills: 0 | Status: SHIPPED | OUTPATIENT
Start: 2018-01-29 | End: 2018-12-14

## 2018-01-29 RX ORDER — CLOPIDOGREL BISULFATE 75 MG/1
75 TABLET ORAL DAILY
Status: ON HOLD | COMMUNITY
End: 2019-07-20

## 2018-01-29 RX ORDER — CODEINE PHOSPHATE AND GUAIFENESIN 10; 100 MG/5ML; MG/5ML
5 SOLUTION ORAL 3 TIMES DAILY PRN
Qty: 150 ML | Refills: 0 | Status: SHIPPED | OUTPATIENT
Start: 2018-01-29 | End: 2018-02-08

## 2018-01-29 RX ORDER — LANCETS
EACH MISCELLANEOUS
COMMUNITY
Start: 2018-01-19 | End: 2019-01-11

## 2018-01-29 RX ADMIN — LINCOMYCIN HYDROCHLORIDE 600 MG: 300 INJECTION, SOLUTION INTRAMUSCULAR; INTRAVENOUS; SUBCONJUNCTIVAL at 02:01

## 2018-01-29 RX ADMIN — BETAMETHASONE SODIUM PHOSPHATE AND BETAMETHASONE ACETATE 12 MG: 3; 3 INJECTION, SUSPENSION INTRA-ARTICULAR; INTRALESIONAL; INTRAMUSCULAR; SOFT TISSUE at 02:01

## 2018-01-29 NOTE — PROGRESS NOTES
Patient ID by name and . Celestone 12 mg IM injection given in right ventrogluteal and Lincocin 600 mg IM injection given in left ventrogluteal using aseptic technique. Aspirated with no blood noted. Patient tolerated well. Given per physicians order. No adverse reactions noted.

## 2018-01-30 NOTE — PROGRESS NOTES
Subjective:       Patient ID: Jarrett Charlton Jr. is a 67 y.o. male.    Chief Complaint: URI    HPI  Pt sates sart getting sick friday sinuses congestion hA  Coughing and increase liquid stool leg cramp better with KCL supplement from wife but having leg cramp no sob cp no n/v  Review of Systems    Objective:      Physical Exam   Constitutional: He is oriented to person, place, and time. He appears well-developed and well-nourished. No distress.   HENT:   Head: Normocephalic and atraumatic.   Right Ear: External ear normal.   Left Ear: External ear normal.   Mouth/Throat: Oropharynx is clear and moist. No oropharyngeal exudate.   Severe nasal congestion   Eyes: Conjunctivae and EOM are normal. Pupils are equal, round, and reactive to light. Right eye exhibits no discharge. Left eye exhibits no discharge.   Neck: Normal range of motion. Neck supple. No thyromegaly present.   Cardiovascular: Normal rate, regular rhythm, normal heart sounds and intact distal pulses.  Exam reveals no gallop and no friction rub.    No murmur heard.  Pulmonary/Chest: Effort normal and breath sounds normal. No respiratory distress. He has no wheezes. He has no rales. He exhibits no tenderness.   Abdominal: Soft. Bowel sounds are normal. He exhibits no distension. There is no tenderness. There is no rebound and no guarding.   Musculoskeletal: Normal range of motion. He exhibits no edema, tenderness or deformity.   Lymphadenopathy:     He has no cervical adenopathy.   Neurological: He is alert and oriented to person, place, and time.   Skin: Skin is warm and dry. Capillary refill takes less than 2 seconds. No rash noted. No erythema.   Psychiatric: He has a normal mood and affect. Judgment and thought content normal.   Nursing note and vitals reviewed.      Assessment:       1. Acute non-recurrent sinusitis, unspecified location    2. Gastroenteritis    3. Tachycardia        Plan:       Acute non-recurrent sinusitis, unspecified location  -      betamethasone acetate-betamethasone sodium phosphate injection 12 mg; Inject 2 mLs (12 mg total) into the muscle one time.  -     guaifenesin-codeine 100-10 mg/5 ml (TUSSI-ORGANIDIN NR)  mg/5 mL syrup; Take 5 mLs by mouth 3 (three) times daily as needed.  Dispense: 150 mL; Refill: 0  -     cetirizine (ZYRTEC) 10 MG tablet; Take 1 tablet (10 mg total) by mouth once daily.  Dispense: 30 tablet; Refill: 0  -     azithromycin (Z-JASE) 250 MG tablet; Take 2 tablets by mouth on day 1; Take 1 tablet by mouth on days 2-5  Dispense: 6 tablet; Refill: 0    Gastroenteritis  -     lincomycin injection 600 mg; Inject 2 mLs (600 mg total) into the muscle one time.    Tachycardia  -     lincomycin injection 600 mg; Inject 2 mLs (600 mg total) into the muscle one time.

## 2018-02-07 ENCOUNTER — CLINICAL SUPPORT (OUTPATIENT)
Dept: DERMATOLOGY | Facility: CLINIC | Age: 68
End: 2018-02-07
Payer: MEDICARE

## 2018-02-07 DIAGNOSIS — L57.0 ACTINIC KERATOSES: ICD-10-CM

## 2018-02-07 PROCEDURE — 99999 PR PBB SHADOW E&M-EST. PATIENT-LVL III: CPT | Mod: PBBFAC,,,

## 2018-02-07 PROCEDURE — 96567 PDT DSTR PRMLG LES SKN: CPT | Mod: S$GLB,,, | Performed by: PATHOLOGY

## 2018-02-07 NOTE — Clinical Note
The pt did very well under the light. He is returning to see you 2-8-18. He has a very tender growing nodule on his left ear. His f/up pdt appt is in April 2018.

## 2018-02-07 NOTE — PROGRESS NOTES
Photodynamic Therapy Note.    PDT ordered per Dr. Koch    Patient here today for treatment of actinic keratoses using photodynamic therapy.  Risks including but not limited to burning, stinging, redness, swelling, crusting or blistering of the skin of the area treated were discussed with patient.  Patient elects to proceed with photodynamic therapy.    Treatment area:  Forearms & hands  Treatment area cleaned with rubbing alcohol, Levulan Kerastick (2)  applied evenly to entire surface and allowed to absorb for 120 minutes.  Patient then placed under Maximo-U light for 16 minutes 40 seconds.    Patient tolerated treatment well with only mild but tolerable symptoms of discomfort.  Area washed gently with mild soap and water; Zinc oxide sunscreen applied.  Patient advised to avoid any significant light exposure (sun and artificial) for next 48 hours.    RTC:  In 1 month or sooner if concern arises.

## 2018-02-08 ENCOUNTER — OFFICE VISIT (OUTPATIENT)
Dept: DERMATOLOGY | Facility: CLINIC | Age: 68
End: 2018-02-08
Payer: MEDICARE

## 2018-02-08 DIAGNOSIS — D48.5 NEOPLASM OF UNCERTAIN BEHAVIOR OF SKIN: Primary | ICD-10-CM

## 2018-02-08 PROCEDURE — 88305 TISSUE EXAM BY PATHOLOGIST: CPT | Mod: 26,,, | Performed by: PATHOLOGY

## 2018-02-08 PROCEDURE — 11101 PR BIOPSY, EACH ADDED LESION: CPT | Mod: PBBFAC | Performed by: PATHOLOGY

## 2018-02-08 PROCEDURE — 99213 OFFICE O/P EST LOW 20 MIN: CPT | Performed by: PATHOLOGY

## 2018-02-08 PROCEDURE — 88305 TISSUE EXAM BY PATHOLOGIST: CPT | Performed by: PATHOLOGY

## 2018-02-08 PROCEDURE — 99999 PR PBB SHADOW E&M-EST. PATIENT-LVL III: CPT | Mod: PBBFAC,,, | Performed by: PATHOLOGY

## 2018-02-08 PROCEDURE — 1159F MED LIST DOCD IN RCRD: CPT | Mod: S$GLB,,, | Performed by: PATHOLOGY

## 2018-02-08 PROCEDURE — 11100 PR BIOPSY OF SKIN LESION: CPT | Mod: S$PBB,,, | Performed by: PATHOLOGY

## 2018-02-08 PROCEDURE — 11100 PR BIOPSY OF SKIN LESION: CPT | Mod: PBBFAC | Performed by: PATHOLOGY

## 2018-02-08 PROCEDURE — 11101 PR BIOPSY, EACH ADDED LESION: CPT | Mod: S$PBB,,, | Performed by: PATHOLOGY

## 2018-02-08 PROCEDURE — 3008F BODY MASS INDEX DOCD: CPT | Mod: S$GLB,,, | Performed by: PATHOLOGY

## 2018-02-08 PROCEDURE — 1126F AMNT PAIN NOTED NONE PRSNT: CPT | Mod: S$GLB,,, | Performed by: PATHOLOGY

## 2018-02-08 PROCEDURE — 99212 OFFICE O/P EST SF 10 MIN: CPT | Mod: S$PBB,,, | Performed by: PATHOLOGY

## 2018-02-08 NOTE — PROGRESS NOTES
Subjective:       Patient ID:  Jarrett Charlton Jr. is a 67 y.o. male who presents for   Chief Complaint   Patient presents with    Lesion     Left ear     Lesion  - Initial  Affected locations: left ear  Duration: 3 months  Signs / symptoms: pain and growing  Aggravated by: nothing  Relieving factors/Treatments tried: nothing  Improvement on treatment: no relief    Had PDT to hands and forearms yesterday (120 minute incubation).  Here for enlarging tender nodule on left ear x 3 months.    Review of Systems   Constitutional: Negative for fever, chills and fatigue.   Hematologic/Lymphatic: Bruises/bleeds easily.        Objective:    Physical Exam   Constitutional: He appears well-developed and well-nourished.   Neurological: He is alert.   Skin:   Areas Examined (abnormalities noted in diagram):   Head / Face Inspection Performed              Diagram Legend     Erythematous scaling macule/papule c/w actinic keratosis       Vascular papule c/w angioma      Pigmented verrucoid papule/plaque c/w seborrheic keratosis      Yellow umbilicated papule c/w sebaceous hyperplasia      Irregularly shaped tan macule c/w lentigo     1-2 mm smooth white papules consistent with Milia      Movable subcutaneous cyst with punctum c/w epidermal inclusion cyst      Subcutaneous movable cyst c/w pilar cyst      Firm pink to brown papule c/w dermatofibroma      Pedunculated fleshy papule(s) c/w skin tag(s)      Evenly pigmented macule c/w junctional nevus     Mildly variegated pigmented, slightly irregular-bordered macule c/w mildly atypical nevus      Flesh colored to evenly pigmented papule c/w intradermal nevus       Pink pearly papule/plaque c/w basal cell carcinoma      Erythematous hyperkeratotic cursted plaque c/w SCC      Surgical scar with no sign of skin cancer recurrence      Open and closed comedones      Inflammatory papules and pustules      Verrucoid papule consistent consistent with wart     Erythematous eczematous  patches and plaques     Dystrophic onycholytic nail with subungual debris c/w onychomycosis     Umbilicated papule    Erythematous-base heme-crusted tan verrucoid plaque consistent with inflamed seborrheic keratosis     Erythematous Silvery Scaling Plaque c/w Psoriasis     See annotation      Assessment / Plan:   Rule Out - Biopsy 1: Squamous Cell Carcinoma, Keratoacanthoma versus Squamous Cell Carcinoma versus Other.    Rule Out - Biopsy 2: Hypertrophic Actinic Keratosis versus Squamous Cell Carcinoma versus Other.        Pathology Orders:     Normal Orders This Visit    Tissue Specimen To Pathology, Dermatology     Questions:    Directional Terms:  Other(comment)    Left    Middle    Clinical information:  scaly hyperkeratotic papule on an erythematous base; r/o HAK vs SCC    Specific Site:  helix    Tissue Specimen To Pathology, Dermatology     Questions:    Directional Terms:  Other(comment)    Left    Superior    Clinical information:  crateriform indurated papule; r/o SCC vs KA    Specific Site:  helix near insertion        Neoplasm of uncertain behavior of skin  -     Tissue Specimen To Pathology, Dermatology  -     Tissue Specimen To Pathology, Dermatology    Shave biopsy procedure note:    Shave biopsies x 2 performed after verbal consent including risk of infection, scar, recurrence, need for additional treatment of site. Area prepped with alcohol, anesthetized with approximately 1.0cc of 1% lidocaine with epinephrine. Lesional tissue shaved with razor blade. Hemostasis achieved with application of aluminum chloride followed by hyfrecation. No complications. Dressing applied. Wound care explained.             No Follow-up on file.

## 2018-02-21 ENCOUNTER — TELEPHONE (OUTPATIENT)
Dept: DERMATOLOGY | Facility: CLINIC | Age: 68
End: 2018-02-21

## 2018-02-21 NOTE — TELEPHONE ENCOUNTER
Pt has rescheduled mohs surgery for SCC on L superior helix near insertion from 3/7 at 740 am to 3/8 750 am pt confirmed date and time.

## 2018-02-21 NOTE — TELEPHONE ENCOUNTER
----- Message from Ivette Dallas sent at 2/21/2018  2:27 PM CST -----  Contact: taylor Kimball- pt- pt is calling to speak with the nurse pt would like to reschedule his Moh's surgery can you please call pt at [phone] jc

## 2018-02-22 ENCOUNTER — OFFICE VISIT (OUTPATIENT)
Dept: PRIMARY CARE CLINIC | Facility: CLINIC | Age: 68
End: 2018-02-22
Payer: MEDICARE

## 2018-02-22 VITALS
OXYGEN SATURATION: 99 % | HEART RATE: 87 BPM | HEIGHT: 74 IN | RESPIRATION RATE: 18 BRPM | SYSTOLIC BLOOD PRESSURE: 150 MMHG | DIASTOLIC BLOOD PRESSURE: 93 MMHG | TEMPERATURE: 98 F | BODY MASS INDEX: 26.05 KG/M2 | WEIGHT: 203 LBS

## 2018-02-22 DIAGNOSIS — I25.10 CORONARY ARTERY DISEASE, ANGINA PRESENCE UNSPECIFIED, UNSPECIFIED VESSEL OR LESION TYPE, UNSPECIFIED WHETHER NATIVE OR TRANSPLANTED HEART: ICD-10-CM

## 2018-02-22 DIAGNOSIS — N40.0 BENIGN PROSTATIC HYPERPLASIA WITHOUT LOWER URINARY TRACT SYMPTOMS: ICD-10-CM

## 2018-02-22 DIAGNOSIS — M15.9 GENERALIZED OSTEOARTHRITIS: ICD-10-CM

## 2018-02-22 DIAGNOSIS — R79.89 ABNORMAL LFTS: ICD-10-CM

## 2018-02-22 DIAGNOSIS — S50.01XA TRAUMATIC HEMATOMA OF RIGHT ELBOW, INITIAL ENCOUNTER: Primary | ICD-10-CM

## 2018-02-22 DIAGNOSIS — E11.9 TYPE 2 DIABETES MELLITUS WITHOUT COMPLICATION, WITHOUT LONG-TERM CURRENT USE OF INSULIN: ICD-10-CM

## 2018-02-22 DIAGNOSIS — I10 ESSENTIAL HYPERTENSION: ICD-10-CM

## 2018-02-22 DIAGNOSIS — N18.9 CHRONIC RENAL IMPAIRMENT, UNSPECIFIED CKD STAGE: ICD-10-CM

## 2018-02-22 DIAGNOSIS — C44.209 CANCER OF SKIN OF AURICLE OF LEFT EAR: ICD-10-CM

## 2018-02-22 DIAGNOSIS — K21.9 GASTROESOPHAGEAL REFLUX DISEASE, ESOPHAGITIS PRESENCE NOT SPECIFIED: ICD-10-CM

## 2018-02-22 DIAGNOSIS — E78.5 HYPERLIPIDEMIA, UNSPECIFIED HYPERLIPIDEMIA TYPE: ICD-10-CM

## 2018-02-22 PROCEDURE — 1126F AMNT PAIN NOTED NONE PRSNT: CPT | Mod: S$GLB,,, | Performed by: FAMILY MEDICINE

## 2018-02-22 PROCEDURE — 99213 OFFICE O/P EST LOW 20 MIN: CPT | Mod: S$GLB,,, | Performed by: FAMILY MEDICINE

## 2018-02-22 PROCEDURE — 99999 PR PBB SHADOW E&M-EST. PATIENT-LVL III: CPT | Mod: PBBFAC,,, | Performed by: FAMILY MEDICINE

## 2018-02-22 PROCEDURE — 1159F MED LIST DOCD IN RCRD: CPT | Mod: S$GLB,,, | Performed by: FAMILY MEDICINE

## 2018-02-22 PROCEDURE — 3008F BODY MASS INDEX DOCD: CPT | Mod: S$GLB,,, | Performed by: FAMILY MEDICINE

## 2018-02-22 NOTE — PROGRESS NOTES
Subjective:       Patient ID: Jarrett Charlton Jr. is a 67 y.o. male.    Chief Complaint: Arm Swelling (c/o right arm swelling from blood drawn)    HPI: 67-year-old white male had blood drawn Tuesday a.m. and Dr. Dao's office.  Came in to be evaluated for a purple swollen area on the right elbow--patient had blood drawn--just noticed the blood this morning.  Patient also has some area of ecchymosis on the right forearm.  He patient has a stent so is on blood thinners--Plavix and bed rest.  Patient has never had a bleeding problem before. Lab Glu 145 better 100-120, BUN 33 Cr 1.3 GFR 56-60 does have mild CRI, AST 52 mild elevated LFT     ROS:  Skin: no psoriasis, eczema, skin cancer-see history of present illness ecchymosis and hematoma--had skin cancer on the left auricle  HEENT: No headache, ocular pain, blurred vision, diplopia, epistaxis, hoarseness change in voice, thyroid trouble--patient went to ENT due to some pressure-like sensation in the left anterior cervical node area--followed by inability to speak for a few minutes.  Had scope down to look at the vocal cords and could not find a problem  Lung: No pneumonia, asthma, Tb, wheezing, SOB, no smoking  Heart: No chest pain, ankle edema, palpitations, MI, kemal murmur, +hypertension,+ hyperlipidemia, + CAD with stent  Abdomen: No nausea, vomiting, diarrhea, constipation, ulcers, hepatitis, gallbladder disease, melena, hematochezia, hematemesis--patient states when he eats feels like he is bloated  : no UTI, renal disease, stones history of benign prostatic hypertrophy, CRI status post TURP history of a PSA that was increased has an ileostomy  MS: no fractures, O/A, lupus, rheumatoid, gout  Neuro: No dizziness, LOC, seizures   + diabetes, no anemia, no anxiety, no depression     Objective:   Physical Exam:  General: Well nourished, well developed, no acute distress NAD   SKIN HEMATOMA RIGHT ELBOW ABOUT HALF DOLLAR SIZE PURPLE GREEN COLLAR RAISED ABOUT  HALF TO 1 CM SOFT WELL-DEFINED , some ecchymosis of the right forearm area again about the size of a half-dollar this with maroon-colored   HEENT: Eyes PERRLA, EOM intact, nose patent, throat non-erythematous ears TM clear   NECK: Supple, no bruits, No JVD, no nodes  Lungs: Clear, no rales, rhonchi, wheezing slight decreased  Heart: Regular rate and rhythm, no murmurs, gallops, or rubs  Abdomen: flat, bowel sounds positive, no tenderness, or organomegaly history ileostomy  MS: Range of motion and muscle strength intact  Neuro: Alert, CN intact, oriented X 3  Extremities: No cyanosis, clubbing, or edema         Assessment:       1. Traumatic hematoma of right elbow, initial encounter    2. 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 Chronic renal impairment, unspecified CKD stage    4. Abnormal LFTs    5. Type 2 diabetes mellitus without complication, without long-term current use of insulin    6. Coronary artery disease, angina presence unspecified, unspecified vessel or lesion type, unspecified whether native or transplanted heart    7. Generalized osteoarthritis    8. Essential hypertension    9. Hyperlipidemia, unspecified hyperlipidemia type    10. Gastroesophageal reflux disease, esophagitis presence not specified    11. 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    Chronic renal impairment, unspecified CKD stage    Abnormal LFTs    Type 2 diabetes mellitus without complication, without long-term current use of insulin    Coronary artery disease, angina presence unspecified, unspecified vessel or lesion type, unspecified whether native or transplanted heart    Generalized osteoarthritis    Essential hypertension    Hyperlipidemia, unspecified hyperlipidemia type    Gastroesophageal reflux disease, esophagitis presence not specified    Benign prostatic hyperplasia without lower  urinary tract symptoms      Pt told to use moist heat on right elbow-- should decrease size until approximately gone in 6 weeks  Next time blood drawn due on blood thinners hold site 5 min without peeking   Keep appt Dr Dao for follow up multiple cardiac issues

## 2018-03-01 RX ORDER — METFORMIN HYDROCHLORIDE 500 MG/1
TABLET ORAL
Qty: 180 TABLET | Refills: 3 | Status: SHIPPED | OUTPATIENT
Start: 2018-03-01 | End: 2019-01-31

## 2018-03-08 ENCOUNTER — PROCEDURE VISIT (OUTPATIENT)
Dept: DERMATOLOGY | Facility: CLINIC | Age: 68
End: 2018-03-08
Payer: MEDICARE

## 2018-03-08 ENCOUNTER — TELEPHONE (OUTPATIENT)
Dept: DERMATOLOGY | Facility: CLINIC | Age: 68
End: 2018-03-08

## 2018-03-08 VITALS
DIASTOLIC BLOOD PRESSURE: 94 MMHG | HEART RATE: 66 BPM | WEIGHT: 203 LBS | BODY MASS INDEX: 26.05 KG/M2 | HEIGHT: 74 IN | SYSTOLIC BLOOD PRESSURE: 160 MMHG

## 2018-03-08 DIAGNOSIS — C44.229 SQUAMOUS CELL CARCINOMA OF LEFT EAR: Primary | ICD-10-CM

## 2018-03-08 PROCEDURE — 17311 MOHS 1 STAGE H/N/HF/G: CPT | Mod: S$GLB,,, | Performed by: DERMATOLOGY

## 2018-03-08 PROCEDURE — 13152 CMPLX RPR E/N/E/L 2.6-7.5 CM: CPT | Mod: 51,S$GLB,, | Performed by: DERMATOLOGY

## 2018-03-08 PROCEDURE — 99499 UNLISTED E&M SERVICE: CPT | Mod: S$GLB,,, | Performed by: DERMATOLOGY

## 2018-03-08 RX ORDER — CEPHALEXIN 500 MG/1
500 CAPSULE ORAL 3 TIMES DAILY
Qty: 21 CAPSULE | Refills: 0 | Status: SHIPPED | OUTPATIENT
Start: 2018-03-08 | End: 2018-03-15

## 2018-03-08 RX ORDER — OXYCODONE AND ACETAMINOPHEN 5; 325 MG/1; MG/1
1 TABLET ORAL
Qty: 20 TABLET | Refills: 0 | Status: SHIPPED | OUTPATIENT
Start: 2018-03-08 | End: 2018-08-28

## 2018-03-08 NOTE — PROGRESS NOTES
PROCEDURE: Mohs' Micrographic Surgery    INDICATION: Location in mask areas of face including central face, nose, eyelids, eyebrows, lips, chin, preauricular, temple, and ear. Biopsy-proven skin cancer of cosmetically and functionally important areas, including head, neck, genital, hand, foot, or areas known for having difficulty in healing, such as the lower anterior legs. Tumor with ill-defined borders.    REFERRING MD: Florencia Koch M.D.    CASE NUMBER:     ANESTHETIC: 2 cc 0.5% Lidocaine with Epi 1:200,000 mixed 1:1 with 0.5% Bupivacaine    SURGICAL PREP: Betadine    SURGEON: Maurice Kimball MD    ASSISTANTS: Ibis Rodriguez PA-C and Kayley Barahona, Surg Tech    PREOPERATIVE DIAGNOSIS: squamous cell carcinoma    POSTOPERATIVE DIAGNOSIS: squamous cell carcinoma    PATHOLOGIC DIAGNOSIS: squamous cell carcinoma- invasive, well differentiated    HISTOLOGY OF SPECIMENS IN FIRST STAGE:   Tumor Type: No tumor seen.    STAGES OF MOHS' SURGERY PERFORMED: 1    TUMOR-FREE PLANE ACHIEVED: Yes    HEMOSTASIS: electrocoagulation     SPECIMENS: 2    LOCATION: left superior helix near insertion. Patient verified location.    INITIAL LESION SIZE: 0.6 x 0.8 cm    FINAL DEFECT SIZE: 0.7 x 1.2 cm    WOUND REPAIR/DISPOSITION: The patient tolerated Mohs' Micrographic Surgery for a squamous cell carcinoma very well. When the tumor was completely removed, a repair of the surgical defect was undertaken.      PROCEDURE: Complex Linear Repair    INDICATION: Status post Mohs' Micrographic Surgery for squamous cell carcinoma.    CASE NUMBER:     SURGEON: Maurice Kimball MD    ASSISTANTS: Ibis Rodriguez PA-C, Julisa Calderón MA and Amina Torres MD    ANESTHETIC: 2 cc 1% Lidocaine with Epinephrine 1:100,000    SURGICAL PREP: Betadine    LOCATION: left superior helix near insertion    DEFECT SIZE: 0.7 x 1.2 cm    WOUND REPAIR/DISPOSITION:  After the patient's carcinoma had been completely removed with Mohs' Micrographic Surgery, a  "repair of the surgical defect was undertaken. The patient was returned to the operating suite where the area of left superior helix near insertion was prepped, draped, and anesthetized in the usual sterile fashion. The wound was widely undermined in all directions. Then, electrocoagulation was used to obtain meticulous hemostasis. 4-0 Vicryl buried vertical mattress sutures were placed into the subcutaneous and dermal plane to close the wound and danial the cutaneous wound edge. Bilateral dog ears were identified and were removed by a standard Burow's triangle technique. The cutaneous wound edges were closed using interrupted 5-0 Prolene suture.    The patient tolerated the procedure well.    The area was cleaned and dressed appropriately and the patient was given wound care instructions, as well as appointment for follow-up evaluation. Patient was placed on Percocet 5 prn postop pain and Keflex 500 mg TID x 7 days.    LENGTH OF REPAIR: 3.4 cm    Vitals:    03/08/18 0739 03/08/18 0948   BP: (!) 158/105 (!) 160/94   BP Location: Right arm    Patient Position: Sitting    BP Method: Medium (Automatic)    Pulse: 72 66   Weight: 92.1 kg (203 lb)    Height: 6' 2" (1.88 m)          "

## 2018-03-08 NOTE — TELEPHONE ENCOUNTER
----- Message from Alice Ayala sent at 3/8/2018 11:43 AM CST -----  Contact: pt at [phone]  Rehana vazquez-pt was seen today and needs to change his follow  up appt.

## 2018-03-08 NOTE — TELEPHONE ENCOUNTER
Pt was rescheduled for suture removal on 3/14 in the later afternoon instead of 3/15. Pt was going to be in town that day with his wife and wanted to save a trip.

## 2018-03-14 ENCOUNTER — OFFICE VISIT (OUTPATIENT)
Dept: DERMATOLOGY | Facility: CLINIC | Age: 68
End: 2018-03-14
Payer: MEDICARE

## 2018-03-14 DIAGNOSIS — Z09 POSTOP CHECK: Primary | ICD-10-CM

## 2018-03-14 PROCEDURE — 99999 PR PBB SHADOW E&M-EST. PATIENT-LVL III: CPT | Mod: PBBFAC,,, | Performed by: DERMATOLOGY

## 2018-03-14 PROCEDURE — 99024 POSTOP FOLLOW-UP VISIT: CPT | Mod: S$GLB,,, | Performed by: DERMATOLOGY

## 2018-03-14 NOTE — PROGRESS NOTES
67 y.o. male patient is here for suture removal following Mohs' surgery.    Patient reports no problems left superior helix near insertion.    WOUND PE:  The left superior helix near insertion sutures intact. Wound healing well. Good skin edges. No signs or symptoms of infection.    IMPRESSION:  Healing operative site from Mohs' surgery SCC, left superior helix near insertion s/p Mohs with CLC, postop day # 7.    PLAN:  Sutures removed today. Steri-strips applied.  Continue wound care.  Keep moist with Aquaphor.    RTC:  In 4-6 weeks with Florencia Koch M.D. for Efudex to left middle helix AK with focal transition to SCCIS.

## 2018-03-27 RX ORDER — HYDROCHLOROTHIAZIDE 25 MG/1
TABLET ORAL
Qty: 90 TABLET | Refills: 3 | Status: SHIPPED | OUTPATIENT
Start: 2018-03-27 | End: 2018-05-17

## 2018-04-12 ENCOUNTER — OFFICE VISIT (OUTPATIENT)
Dept: DERMATOLOGY | Facility: CLINIC | Age: 68
End: 2018-04-12
Payer: MEDICARE

## 2018-04-12 DIAGNOSIS — L57.0 AK (ACTINIC KERATOSIS): ICD-10-CM

## 2018-04-12 DIAGNOSIS — D48.5 NEOPLASM OF UNCERTAIN BEHAVIOR OF SKIN: Primary | ICD-10-CM

## 2018-04-12 DIAGNOSIS — D04.9 SQUAMOUS CELL CARCINOMA IN SITU (SCCIS) OF SKIN: ICD-10-CM

## 2018-04-12 PROCEDURE — 17004 DESTROY PREMAL LESIONS 15/>: CPT | Mod: S$GLB,,, | Performed by: PATHOLOGY

## 2018-04-12 PROCEDURE — 88305 TISSUE EXAM BY PATHOLOGIST: CPT | Performed by: PATHOLOGY

## 2018-04-12 PROCEDURE — 11100 PR BIOPSY OF SKIN LESION: CPT | Mod: 59,S$GLB,, | Performed by: PATHOLOGY

## 2018-04-12 PROCEDURE — 99213 OFFICE O/P EST LOW 20 MIN: CPT | Mod: 25,S$GLB,, | Performed by: PATHOLOGY

## 2018-04-12 PROCEDURE — 11101 PR BIOPSY, EACH ADDED LESION: CPT | Mod: S$GLB,,, | Performed by: PATHOLOGY

## 2018-04-12 PROCEDURE — 99999 PR PBB SHADOW E&M-EST. PATIENT-LVL III: CPT | Mod: PBBFAC,,, | Performed by: PATHOLOGY

## 2018-04-12 RX ORDER — FLUOROURACIL 50 MG/G
CREAM TOPICAL
Qty: 40 G | Refills: 1 | Status: SHIPPED | OUTPATIENT
Start: 2018-04-12 | End: 2018-05-31 | Stop reason: SDUPTHER

## 2018-04-12 RX ORDER — ALLOPURINOL 300 MG/1
TABLET ORAL
COMMUNITY
Start: 2018-03-31 | End: 2020-01-03 | Stop reason: SDUPTHER

## 2018-04-12 NOTE — PROGRESS NOTES
Subjective:       Patient ID:  Jarrett Charlton Jr. is a 68 y.o. male who presents for   Chief Complaint   Patient presents with    Skin Check     HPI  Pt last seen by johanny 02/18 and had 2 biopsies:  FINAL PATHOLOGIC DIAGNOSIS  1. Skin, left superior helix near insertion, shave biopsy:  - INVASIVE SQUAMOUS CELL CARCINOMA.  - LESION IS TRANSECTED AT THE BASE.    2. Skin, left middle helix, shave biopsy:  - ACTINIC KERATOSIS WITH FOCAL TRANSITION TO SQUAMOUS CELL CARCINOMA IN SITU.  - IN SITU CARCINOMA IS TRANSECTED AT THE BASE.    L superior helix SCC s/p Mohs by Dr. Kimball.  Left middle helix not treated.  Had PDT to hands and forearms Feb. 2018 (120 minute incubation) - right arm with reasonably good response but numerous persistent tender papules to left forearm.    Review of Systems   Constitutional: Negative.  Negative for fever, chills and fatigue.   Skin: Positive for dry skin and daily sunscreen use. Negative for itching and rash.   Hematologic/Lymphatic: Bruises/bleeds easily.        Objective:    Physical Exam   Constitutional: He appears well-developed and well-nourished.   Neurological: He is alert.   Skin:   Areas Examined (abnormalities noted in diagram):   Scalp / Hair Palpated and Inspected  Head / Face Inspection Performed  Neck Inspection Performed  RUE Inspected  LUE Inspection Performed                           Diagram Legend     Erythematous scaling macule/papule c/w actinic keratosis       Vascular papule c/w angioma      Pigmented verrucoid papule/plaque c/w seborrheic keratosis      Yellow umbilicated papule c/w sebaceous hyperplasia      Irregularly shaped tan macule c/w lentigo     1-2 mm smooth white papules consistent with Milia      Movable subcutaneous cyst with punctum c/w epidermal inclusion cyst      Subcutaneous movable cyst c/w pilar cyst      Firm pink to brown papule c/w dermatofibroma      Pedunculated fleshy papule(s) c/w skin tag(s)      Evenly pigmented macule c/w junctional  nevus     Mildly variegated pigmented, slightly irregular-bordered macule c/w mildly atypical nevus      Flesh colored to evenly pigmented papule c/w intradermal nevus       Pink pearly papule/plaque c/w basal cell carcinoma      Erythematous hyperkeratotic cursted plaque c/w SCC      Surgical scar with no sign of skin cancer recurrence      Open and closed comedones      Inflammatory papules and pustules      Verrucoid papule consistent consistent with wart     Erythematous eczematous patches and plaques     Dystrophic onycholytic nail with subungual debris c/w onychomycosis     Umbilicated papule    Erythematous-base heme-crusted tan verrucoid plaque consistent with inflamed seborrheic keratosis     Erythematous Silvery Scaling Plaque c/w Psoriasis     See annotation      Assessment / Plan:      Pathology Orders:     Normal Orders This Visit    Tissue Specimen To Pathology, Dermatology     Questions:    Directional Terms:  Other(comment)    Left    Clinical information:  hyperkeratotic erythematous papule, r/o SCC    Specific Site:  forearm #1    Tissue Specimen To Pathology, Dermatology     Questions:    Directional Terms:  Other(comment)    Left    Clinical information:  hyperkeratotic erythematous papule, r/o SCC    Specific Site:  forearm #2    Tissue Specimen To Pathology, Dermatology     Questions:    Directional Terms:  Other(comment)    Left    Clinical information:  hyperkeratotic erythematous papule, r/o SCC    Specific Site:  forearm #3    Tissue Specimen To Pathology, Dermatology     Questions:    Directional Terms:  Other(comment)    Left    Clinical information:  hyperkeratotic erythematous papule, r/o SCC    Specific Site:  forearm #4    Tissue Specimen To Pathology, Dermatology     Questions:    Directional Terms:  Other(comment)    Left    Clinical information:  hyperkeratotic erythematous papule, r/o SCC    Specific Site:  forearm #5        Neoplasm of uncertain behavior of skin  -     Tissue  Specimen To Pathology, Dermatology  -     Tissue Specimen To Pathology, Dermatology  -     Tissue Specimen To Pathology, Dermatology  -     Tissue Specimen To Pathology, Dermatology  -     Tissue Specimen To Pathology, Dermatology    Shave biopsy procedure note:    Shave biopsies x 5 performed after verbal consent including risk of infection, scar, recurrence, need for additional treatment of site. Area prepped with alcohol, anesthetized with approximately 1.0cc of 1% lidocaine with epinephrine. Lesional tissue shaved with razor blade. Hemostasis achieved with application of aluminum chloride followed by hyfrecation. No complications. Dressing applied. Wound care explained.        AK (actinic keratosis)  -     fluorouracil (EFUDEX) 5 % cream; AAA bid x 2-3 weeks to left ear and left forearm and hand; avoid unhealed biopsy sites  Dispense: 40 g; Refill: 1    Squamous cell carcinoma in situ (SCCIS) of skin  -     fluorouracil (EFUDEX) 5 % cream; AAA bid x 2-3 weeks to left ear and left forearm and hand; avoid unhealed biopsy sites  Dispense: 40 g; Refill: 1             Follow-up in about 6 weeks (around 5/24/2018).

## 2018-05-04 RX ORDER — OMEPRAZOLE 40 MG/1
CAPSULE, DELAYED RELEASE ORAL
Qty: 90 CAPSULE | Refills: 3 | Status: SHIPPED | OUTPATIENT
Start: 2018-05-04 | End: 2019-01-11

## 2018-05-04 RX ORDER — PRAVASTATIN SODIUM 40 MG/1
TABLET ORAL
Qty: 90 TABLET | Refills: 3 | Status: SHIPPED | OUTPATIENT
Start: 2018-05-04 | End: 2019-02-04 | Stop reason: SDUPTHER

## 2018-05-17 ENCOUNTER — OFFICE VISIT (OUTPATIENT)
Dept: NEPHROLOGY | Facility: CLINIC | Age: 68
End: 2018-05-17
Payer: MEDICARE

## 2018-05-17 VITALS
BODY MASS INDEX: 25.93 KG/M2 | SYSTOLIC BLOOD PRESSURE: 146 MMHG | HEIGHT: 74 IN | DIASTOLIC BLOOD PRESSURE: 82 MMHG | OXYGEN SATURATION: 98 % | WEIGHT: 202 LBS | HEART RATE: 89 BPM

## 2018-05-17 DIAGNOSIS — I10 ESSENTIAL HYPERTENSION: ICD-10-CM

## 2018-05-17 DIAGNOSIS — N18.2 CKD (CHRONIC KIDNEY DISEASE) STAGE 2, GFR 60-89 ML/MIN: Primary | ICD-10-CM

## 2018-05-17 PROCEDURE — 99999 PR PBB SHADOW E&M-EST. PATIENT-LVL III: CPT | Mod: PBBFAC,,, | Performed by: INTERNAL MEDICINE

## 2018-05-17 PROCEDURE — 3079F DIAST BP 80-89 MM HG: CPT | Mod: CPTII,S$GLB,, | Performed by: INTERNAL MEDICINE

## 2018-05-17 PROCEDURE — 3077F SYST BP >= 140 MM HG: CPT | Mod: CPTII,S$GLB,, | Performed by: INTERNAL MEDICINE

## 2018-05-17 PROCEDURE — 99214 OFFICE O/P EST MOD 30 MIN: CPT | Mod: S$GLB,,, | Performed by: INTERNAL MEDICINE

## 2018-05-17 RX ORDER — ISOPROPYL ALCOHOL 0.75 G/1
SWAB TOPICAL
COMMUNITY
Start: 2018-04-13 | End: 2023-04-04 | Stop reason: SDUPTHER

## 2018-05-17 NOTE — PROGRESS NOTES
Subjective:       Patient ID: Jarrett Charlton Jr. is a 68 y.o. White male who presents for follow-up evaluation of No chief complaint on file.    HPI This is a 68-year-old white male with longstanding hypertension, anemia, and chronic kidney disease is coming in for followup of these. DM labile per pt with a1c of 7.0 . Bp's  at home in the 120's/80's. No proteinuria and anemia stable. Denies NSAID's.     PAST MEDICAL HISTORY: Hypertension for 5 years,recently diag diabetes, gout, ulcerative colitis with ostomy and hyperlipidemia.     Review of Systems   Constitutional: Positive for fatigue. Negative for appetite change.   Eyes: Negative for discharge.   Respiratory: Negative for cough, shortness of breath and wheezing.    Cardiovascular: Negative for chest pain and palpitations.   Gastrointestinal: Negative for abdominal pain, diarrhea, nausea and vomiting.   Genitourinary: Negative for dysuria, frequency, hematuria and urgency.   Skin: Negative for color change and rash.   Psychiatric/Behavioral: Negative for confusion.   All other systems reviewed and are negative.      Objective:      Physical Exam   Constitutional: He is oriented to person, place, and time. He appears well-developed and well-nourished.   HENT:   Right Ear: External ear normal.   Left Ear: External ear normal.   Nose: Nose normal.   Mouth/Throat: Normal dentition.   Eyes: Conjunctivae, EOM and lids are normal. Pupils are equal, round, and reactive to light.   Neck: Trachea normal. No thyroid mass present.   Cardiovascular: Normal rate and regular rhythm.  Exam reveals no friction rub.    No murmur heard.  No lower extremity edema   Pulmonary/Chest: Effort normal and breath sounds normal. No respiratory distress. He has no decreased breath sounds. He has no rales.   Abdominal: Soft. Bowel sounds are normal. He exhibits no mass. There is no tenderness. There is no rebound. No hernia.   Ostomy bag inplace.   Musculoskeletal: He exhibits no edema.    Neurological: He is alert and oriented to person, place, and time.   Skin: Skin is warm and dry. No rash noted. No erythema.   Psychiatric: He has a normal mood and affect. Judgment normal. His mood appears not anxious. He does not exhibit a depressed mood.   Oriented to time, place and person.   Vitals reviewed.      Assessment:       No diagnosis found.    Plan:           This is a 68 year-old white male with hypertension,CKD,DM,   ulcerative colitis who is coming in for followup.     PROBLEMS:   1. 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 His baseline   creatinines use to beanywhere from 1.5 to 1.7 with average in the 1.3  range with the most recent creatinine being 1.1. His CKD is secondary to age-related nephron loss, HTN along with NSAID use in the past for his gout. Off NSAID's. Hydrate well especially when he has more ostomy output.  2. Hypertension. Bp's stable.   Monitor bp's closely. Asked pt to hold HCTZ to help with bp's.   3. Anemia. H&H is stable.   4. Renal osteodystrophy. His intact PTH along with ca/phos is stable.   On vit d OTC.  5. Proteinuria. He has no sig proteinuria.  On lisinopril-increased from 5 mg to 10 mg by his cardiologist recently.    We will see the patient back again in 6 Months with rfp, i pth, urine creatinine and urine protein.

## 2018-05-23 ENCOUNTER — OFFICE VISIT (OUTPATIENT)
Dept: RHEUMATOLOGY | Facility: CLINIC | Age: 68
End: 2018-05-23
Payer: MEDICARE

## 2018-05-23 ENCOUNTER — LAB VISIT (OUTPATIENT)
Dept: LAB | Facility: HOSPITAL | Age: 68
End: 2018-05-23
Attending: INTERNAL MEDICINE
Payer: MEDICARE

## 2018-05-23 VITALS — HEIGHT: 74 IN | BODY MASS INDEX: 25.93 KG/M2 | WEIGHT: 202 LBS

## 2018-05-23 DIAGNOSIS — M1A.09X1 IDIOPATHIC CHRONIC GOUT OF MULTIPLE SITES WITH TOPHUS: Primary | ICD-10-CM

## 2018-05-23 DIAGNOSIS — M15.9 GENERALIZED OSTEOARTHRITIS: ICD-10-CM

## 2018-05-23 DIAGNOSIS — M1A.09X1 IDIOPATHIC CHRONIC GOUT OF MULTIPLE SITES WITH TOPHUS: ICD-10-CM

## 2018-05-23 LAB
CRP SERPL-MCNC: 2.3 MG/L
ERYTHROCYTE [SEDIMENTATION RATE] IN BLOOD BY WESTERGREN METHOD: 30 MM/HR
URATE SERPL-MCNC: 3.1 MG/DL

## 2018-05-23 PROCEDURE — 86140 C-REACTIVE PROTEIN: CPT

## 2018-05-23 PROCEDURE — 85651 RBC SED RATE NONAUTOMATED: CPT

## 2018-05-23 PROCEDURE — 36415 COLL VENOUS BLD VENIPUNCTURE: CPT

## 2018-05-23 PROCEDURE — 99213 OFFICE O/P EST LOW 20 MIN: CPT | Mod: S$GLB,,, | Performed by: INTERNAL MEDICINE

## 2018-05-23 PROCEDURE — 99999 PR PBB SHADOW E&M-EST. PATIENT-LVL II: CPT | Mod: PBBFAC,,, | Performed by: INTERNAL MEDICINE

## 2018-05-23 PROCEDURE — 84550 ASSAY OF BLOOD/URIC ACID: CPT

## 2018-05-23 RX ORDER — COLCHICINE 0.6 MG/1
1 CAPSULE ORAL DAILY
Qty: 90 CAPSULE | Refills: 3 | Status: ON HOLD | OUTPATIENT
Start: 2018-05-23 | End: 2018-06-27

## 2018-05-31 ENCOUNTER — OFFICE VISIT (OUTPATIENT)
Dept: DERMATOLOGY | Facility: CLINIC | Age: 68
End: 2018-05-31
Payer: MEDICARE

## 2018-05-31 DIAGNOSIS — L57.0 AK (ACTINIC KERATOSIS): ICD-10-CM

## 2018-05-31 DIAGNOSIS — D04.9 SQUAMOUS CELL CARCINOMA IN SITU (SCCIS) OF SKIN: ICD-10-CM

## 2018-05-31 DIAGNOSIS — L57.0 MULTIPLE ACTINIC KERATOSES: ICD-10-CM

## 2018-05-31 DIAGNOSIS — D48.5 NEOPLASM OF UNCERTAIN BEHAVIOR OF SKIN: Primary | ICD-10-CM

## 2018-05-31 PROCEDURE — 99213 OFFICE O/P EST LOW 20 MIN: CPT | Mod: 25,S$GLB,, | Performed by: PATHOLOGY

## 2018-05-31 PROCEDURE — 11101 PR BIOPSY, EACH ADDED LESION: CPT | Mod: S$GLB,,, | Performed by: PATHOLOGY

## 2018-05-31 PROCEDURE — 88305 TISSUE EXAM BY PATHOLOGIST: CPT | Mod: 59 | Performed by: PATHOLOGY

## 2018-05-31 PROCEDURE — 17000 DESTRUCT PREMALG LESION: CPT | Mod: S$GLB,,, | Performed by: PATHOLOGY

## 2018-05-31 PROCEDURE — 11100 PR BIOPSY OF SKIN LESION: CPT | Mod: 59,S$GLB,, | Performed by: PATHOLOGY

## 2018-05-31 PROCEDURE — 99999 PR PBB SHADOW E&M-EST. PATIENT-LVL IV: CPT | Mod: PBBFAC,,, | Performed by: PATHOLOGY

## 2018-05-31 PROCEDURE — 17003 DESTRUCT PREMALG LES 2-14: CPT | Mod: S$GLB,,, | Performed by: PATHOLOGY

## 2018-05-31 RX ORDER — FLUOROURACIL 50 MG/G
CREAM TOPICAL
Qty: 40 G | Refills: 1 | Status: SHIPPED | OUTPATIENT
Start: 2018-05-31 | End: 2018-12-14

## 2018-05-31 NOTE — PROGRESS NOTES
Subjective:       Patient ID:  Jarrett Charlton Jr. is a 68 y.o. male who presents for   Chief Complaint   Patient presents with    Follow-up     skin check     HPI  Pt with h/o multiple AKs and NMSCs, most recently SCCIS x 5 lesions to left forearm, only one of which had positive lateral margins.  Had PDT to forearms 02/2018 with minimal improvement.  Had numerous AKs treated with LN2 last visit.  Used Efudex cream to left ear and left forearm - not sure if he noticed any improvement.  Had several new scaly tender red bumps to right arm near elbow and left forearm, wrist and hand - all popped up in the last 3-4 weeks.    Review of Systems   Constitutional: Negative for fever, chills and fatigue.   Skin: Positive for daily sunscreen use. Negative for itching and rash.   Hematologic/Lymphatic: Bruises/bleeds easily.        Objective:    Physical Exam   Constitutional: He appears well-developed and well-nourished.   Neurological: He is alert.   Skin:   Areas Examined (abnormalities noted in diagram):   Scalp / Hair Palpated and Inspected  Head / Face Inspection Performed  Neck Inspection Performed  RUE Inspected  LUE Inspection Performed                   Diagram Legend     Erythematous scaling macule/papule c/w actinic keratosis       Vascular papule c/w angioma      Pigmented verrucoid papule/plaque c/w seborrheic keratosis      Yellow umbilicated papule c/w sebaceous hyperplasia      Irregularly shaped tan macule c/w lentigo     1-2 mm smooth white papules consistent with Milia      Movable subcutaneous cyst with punctum c/w epidermal inclusion cyst      Subcutaneous movable cyst c/w pilar cyst      Firm pink to brown papule c/w dermatofibroma      Pedunculated fleshy papule(s) c/w skin tag(s)      Evenly pigmented macule c/w junctional nevus     Mildly variegated pigmented, slightly irregular-bordered macule c/w mildly atypical nevus      Flesh colored to evenly pigmented papule c/w intradermal nevus       Pink  pearly papule/plaque c/w basal cell carcinoma      Erythematous hyperkeratotic cursted plaque c/w SCC      Surgical scar with no sign of skin cancer recurrence      Open and closed comedones      Inflammatory papules and pustules      Verrucoid papule consistent consistent with wart     Erythematous eczematous patches and plaques     Dystrophic onycholytic nail with subungual debris c/w onychomycosis     Umbilicated papule    Erythematous-base heme-crusted tan verrucoid plaque consistent with inflamed seborrheic keratosis     Erythematous Silvery Scaling Plaque c/w Psoriasis     See annotation      Assessment / Plan:      Pathology Orders:     Normal Orders This Visit    Tissue Specimen To Pathology, Dermatology     Questions:    Directional Terms:  Other(comment)    Left    Clinical information:  hyperkeratotic erythematous papule; r/o HAK vs SCC    Specific Site:  forearm (1)    Tissue Specimen To Pathology, Dermatology     Questions:    Directional Terms:  Other(comment)    Left    Clinical information:  hyperkeratotic erythematous papule; r/o HAK vs SCC    Specific Site:  wrist (2)    Tissue Specimen To Pathology, Dermatology     Questions:    Directional Terms:  Other(comment)    Left    Clinical information:  hyperkeratotic erythematous papule; r/o HAK vs SCC    Specific Site:  base of thumb (3)    Tissue Specimen To Pathology, Dermatology     Questions:    Directional Terms:  Other(comment)    Left    Clinical information:  hyperkeratotic erythematous papule; r/o HAK vs SCC    Specific Site:  hand radial (4)    Tissue Specimen To Pathology, Dermatology     Questions:    Directional Terms:  Other(comment)    Left    Clinical information:  hyperkeratotic erythematous papule; r/o HAK vs SCC    Specific Site:  hand ulnar (5)    Tissue Specimen To Pathology, Dermatology     Questions:    Directional Terms:  Other(comment)    Right    Clinical information:  hyperkeratotic erythematous papule; r/o HAK vs SCC     Specific Site:  arm near elbow (6)        Neoplasm of uncertain behavior of skin  -     Tissue Specimen To Pathology, Dermatology  -     Tissue Specimen To Pathology, Dermatology  -     Tissue Specimen To Pathology, Dermatology  -     Tissue Specimen To Pathology, Dermatology  -     Tissue Specimen To Pathology, Dermatology  -     Tissue Specimen To Pathology, Dermatology    Shave biopsy procedure note:    Shave biopsies x 5 performed after verbal consent including risk of infection, scar, recurrence, need for additional treatment of site. Area prepped with alcohol, anesthetized with approximately 1.0cc of 1% lidocaine with epinephrine. Lesional tissue shaved with razor blade. Hemostasis achieved with application of aluminum chloride followed by hyfrecation. No complications. Dressing applied. Wound care explained.        Multiple actinic keratoses - Cryosurgery Procedure Note    Verbal consent from the patient is obtained including, but not limited to, risk of hypopigmentation/hyperpigmentation, scar, recurrence of lesion. The patient is aware of the precancerous quality and need for treatment of these lesions. Liquid nitrogen cryosurgery is applied to the 2 actinic keratoses, as detailed in the physical exam, to produce a freeze injury. The patient is aware that blisters may form and is instructed on wound care with gentle cleansing and use of vaseline ointment to keep moist until healed. The patient is supplied a handout on cryosurgery and is instructed to call if lesions do not completely resolve.      AK (actinic keratosis)  -     fluorouracil (EFUDEX) 5 % cream; AAA bid x 2-3 weeks to right forearm and hand; avoid unhealed biopsy sites  Dispense: 40 g; Refill: 1    Squamous cell carcinoma in situ (SCCIS) of skin  - plan to treat recent biopsy sites positive for SCCIS with Efudex             Follow-up in about 6 weeks (around 7/12/2018).

## 2018-06-15 ENCOUNTER — TELEPHONE (OUTPATIENT)
Dept: UROLOGY | Facility: CLINIC | Age: 68
End: 2018-06-15

## 2018-06-15 ENCOUNTER — OFFICE VISIT (OUTPATIENT)
Dept: UROLOGY | Facility: CLINIC | Age: 68
End: 2018-06-15
Payer: MEDICARE

## 2018-06-15 VITALS
SYSTOLIC BLOOD PRESSURE: 179 MMHG | WEIGHT: 202 LBS | HEIGHT: 74 IN | BODY MASS INDEX: 25.93 KG/M2 | DIASTOLIC BLOOD PRESSURE: 109 MMHG | HEART RATE: 77 BPM

## 2018-06-15 DIAGNOSIS — R97.20 ELEVATED PSA: Primary | ICD-10-CM

## 2018-06-15 DIAGNOSIS — D49.59 NEOPLASM OF PROSTATE: ICD-10-CM

## 2018-06-15 DIAGNOSIS — R97.20 RISING PSA LEVEL: ICD-10-CM

## 2018-06-15 DIAGNOSIS — Z90.79 S/P TURP: ICD-10-CM

## 2018-06-15 LAB
BILIRUB SERPL-MCNC: NORMAL MG/DL
BLOOD URINE, POC: NORMAL
COLOR, POC UA: YELLOW
GLUCOSE UR QL STRIP: NORMAL
KETONES UR QL STRIP: NORMAL
LEUKOCYTE ESTERASE URINE, POC: NORMAL
NITRITE, POC UA: NORMAL
PH, POC UA: 5
PROTEIN, POC: NORMAL
SPECIFIC GRAVITY, POC UA: 1.01
UROBILINOGEN, POC UA: NORMAL

## 2018-06-15 PROCEDURE — 81002 URINALYSIS NONAUTO W/O SCOPE: CPT | Mod: S$GLB,,, | Performed by: NURSE PRACTITIONER

## 2018-06-15 PROCEDURE — 3080F DIAST BP >= 90 MM HG: CPT | Mod: CPTII,S$GLB,, | Performed by: NURSE PRACTITIONER

## 2018-06-15 PROCEDURE — 3077F SYST BP >= 140 MM HG: CPT | Mod: CPTII,S$GLB,, | Performed by: NURSE PRACTITIONER

## 2018-06-15 PROCEDURE — 99999 PR PBB SHADOW E&M-EST. PATIENT-LVL IV: CPT | Mod: PBBFAC,,, | Performed by: NURSE PRACTITIONER

## 2018-06-15 PROCEDURE — 99214 OFFICE O/P EST MOD 30 MIN: CPT | Mod: 25,S$GLB,, | Performed by: NURSE PRACTITIONER

## 2018-06-15 NOTE — TELEPHONE ENCOUNTER
----- Message from Catarino Mota MD sent at 6/15/2018 12:07 PM CDT -----  Let's to MRI of prostate to r/o any lesion.  Then will figure out how to do the bx if necessary ( such as transperineal approach).  ----- Message -----  From: Floridalma Arias NP  Sent: 6/15/2018  11:52 AM  To: Catarino Mota MD    Rising PSA on Finasteride.   Unable to TRUS bx due to anal closure.

## 2018-06-15 NOTE — TELEPHONE ENCOUNTER
Discussed findings with Dr. Mota.  Informed Mr. Charlton.  Plan for MRI of prostate due to rising psa and unable to do ROGELIO/TRUS bx 2/2 anal closure

## 2018-06-15 NOTE — PROGRESS NOTES
Subjective:       Patient ID: Jarrett Charlton Jr. is a 68 y.o. male.    Chief Complaint: Follow-up and Elevated PSA    Jarrett Charlton Jr. is a 68 y.o. Male with history of BPH and elevated PSA.  No family history of Prostate Cancer;   Hx of ulcerative colitis, had colon surgery back in 1985 and his anus was closed.  Therefore doing TRUS bx of prostate is not feasible.    He was last seen in clinic with Dr. Mota 06/09/2017.  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  Had tried Flomax in the past but did not improve LUTS  After discussion, he decided to try finasteride 06/14/2016 to see whether it will lower his PSA.    Today here for annual exam.  He voices no complaints.  FOS not as strong as it used to be years ago but ok with how he urinates.  He has nocturia but he is up 3x night to empty his ileostomy bag not because he needs urinate.             PSA                  3.1                 05/08/2018                 PSA                  1.7                 06/05/2017                 PSA                  1.4                 06/06/2016                 PSA                  2.1                 10/16/2015                 PSA                  4.3 (H)             06/19/2015                 PSA                      2.1                 09/15/2014                 PSA                      2.16                05/13/2013                 PSA                      1.2                 03/19/2012                 PSA                      1.0                 02/19/2010                 PSA                      1.4                 02/17/2009                 PSA                      1.1                 04/30/2007                 PSA                      1.0                 05/12/2006                 PSA                      1.0                 04/22/2005                                    Past Medical  History:  No date: Basal cell carcinoma  No date: BPH (benign prostatic hyperplasia)      Comment: s/p TURP  No date: Carotid stenosis  No date: Chronic kidney disease  No date: CKD (chronic kidney disease)  No date: Claudication  10/21/2013: DDD (degenerative disc disease)  No date: Diabetes mellitus  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  No date: Disc disease, degenerative, cervical  No date: GERD (gastroesophageal reflux disease)  No date: Gout, chronic  No date: History of ulcerative colitis      Comment: s/p colectomy and ileostomy  No date: HLD (hyperlipidemia)  No date: HTN (hypertension)  1982: Ileostomy in place  No date: PVC (premature ventricular contraction)  No date: RBBB  03/08/2018: Squamous cell carcinoma      Comment: Left superior helix near insertion  04/12/2018: Squamous cell carcinoma      Comment: Left forearm x 5  No date: Ventricular tachycardia    Past Surgical History:  No date: cardiac stents  No date: CATARACT EXTRACTION Bilateral  No date: CERVICAL FUSION  No date: colectomy and ileostomy  No date: TRANSURETHRAL RESECTION OF PROSTATE    Review of patient's family history indicates:  Problem: Melanoma      Relation: Neg Hx       Age of Onset: (Not Specified)   Problem: Hypertension      Relation: Neg Hx       Age of Onset: (Not Specified)   Problem: Diabetes      Relation: Neg Hx       Age of Onset: (Not Specified)   Problem: Arthritis      Relation: Neg Hx       Age of Onset: (Not Specified)       Social History    Marital status:              Spouse name:                       Years of education:                 Number of children:               Occupational History    None on file    Social History Main Topics    Smoking status: Never Smoker                                                                Smokeless tobacco: Never Used                        Alcohol use: No              Drug use: No              Sexual activity: Not on file          Other Topics             Concern    None on file    Social History Narrative    None on file        Allergies:  Patient has no known allergies.    Medications:  Current Outpatient Prescriptions:   ACCU-CHEK SOFTCLIX LANCETS Misc, , Disp: , Rfl:   allopurinol (ZYLOPRIM) 300 MG tablet, , Disp: , Rfl:   aspirin (ECOTRIN) 81 MG EC tablet, Take 81 mg by mouth once daily.  , Disp: , Rfl:   BD ALCOHOL SWABS PadM, , Disp: , Rfl:   blood sugar diagnostic (ONE TOUCH ULTRA TEST) Strp, USE ONE STRIP TO CHECK GLUCOSE EVERY DAY, Disp: 100 each, Rfl: 11  cetirizine (ZYRTEC) 10 MG tablet, Take 1 tablet (10 mg total) by mouth once daily., Disp: 30 tablet, Rfl: 0  clopidogrel (PLAVIX) 75 mg tablet, Take 75 mg by mouth once daily., Disp: , Rfl:   colchicine (MITIGARE) 0.6 mg Cap, Take 1 capsule (0.6 mg total) by mouth once daily., Disp: 90 capsule, Rfl: 3  finasteride (PROSCAR) 5 mg tablet, Take 1 tablet (5 mg total) by mouth once daily., Disp: 90 tablet, Rfl: 3  fluorouracil (EFUDEX) 5 % cream, AAA bid x 2-3 weeks to right forearm and hand; avoid unhealed biopsy sites, Disp: 40 g, Rfl: 1  gabapentin (NEURONTIN) 300 MG capsule, Take 2 capsules (600 mg total) by mouth 2 (two) times daily., Disp: 120 capsule, Rfl: 11  lisinopril (PRINIVIL,ZESTRIL) 5 MG tablet, TAKE ONE TABLET BY MOUTH ONCE DAILY, Disp: 30 tablet, Rfl: 11  metFORMIN (GLUCOPHAGE) 500 MG tablet, TAKE 1 TABLET TWICE DAILY, Disp: 180 tablet, Rfl: 3  metoprolol succinate (TOPROL-XL) 25 MG 24 hr tablet, Take 25 mg by mouth once daily., Disp: , Rfl:   omeprazole (PRILOSEC) 40 MG capsule, TAKE 1 CAPSULE EVERY DAY, Disp: 90 capsule, Rfl: 3  oxyCODONE-acetaminophen (PERCOCET) 5-325 mg per tablet, Take 1 tablet by mouth every 4 to 6 hours as needed for Pain., Disp: 20 tablet, Rfl: 0  pravastatin (PRAVACHOL) 40 MG tablet, TAKE 1 TABLET EVERY DAY, Disp: 90 tablet, Rfl: 3        Review of Systems   Constitutional: Negative for activity change, appetite change, chills and fever.   HENT:  Negative for facial swelling and trouble swallowing.    Eyes: Negative for visual disturbance.   Respiratory: Negative for chest tightness and shortness of breath.    Cardiovascular: Negative for chest pain and palpitations.   Gastrointestinal: Negative.  Negative for abdominal pain, constipation, diarrhea, nausea and vomiting.        No issues with ileostomy     Genitourinary: Positive for nocturia. Negative for difficulty urinating, dysuria, flank pain, hematuria, penile pain, penile swelling, scrotal swelling and testicular pain.        Ok with how he urinates     Musculoskeletal: Negative for back pain, gait problem, myalgias and neck stiffness.   Skin: Negative for rash.   Neurological: Negative for dizziness and speech difficulty.   Hematological: Does not bruise/bleed easily.   Psychiatric/Behavioral: Negative for behavioral problems.       Objective:      Physical Exam   Nursing note and vitals reviewed.  Constitutional: He is oriented to person, place, and time. Vital signs are normal. He appears well-developed and well-nourished. He is active and cooperative.  Non-toxic appearance. He does not have a sickly appearance.   Urine dipped clear of infection in the office today.     HENT:   Head: Normocephalic and atraumatic.   Right Ear: External ear normal.   Left Ear: External ear normal.   Nose: Nose normal.   Mouth/Throat: Mucous membranes are normal.   Eyes: Conjunctivae and lids are normal. No scleral icterus.   Neck: Trachea normal, normal range of motion and full passive range of motion without pain. Neck supple. No JVD present. No tracheal deviation present.   Cardiovascular: Normal rate, S1 normal and S2 normal.    Pulmonary/Chest: Effort normal. No respiratory distress. He exhibits no tenderness.   Abdominal: Soft. Normal appearance and bowel sounds are normal. There is no hepatosplenomegaly. There is no tenderness. There is no CVA tenderness.       Genitourinary: Testes normal. Right testis shows no  mass, no swelling and no tenderness. Left testis shows no mass, no swelling and no tenderness. Circumcised. No hypospadias, penile erythema or penile tenderness. No discharge found.   Genitourinary Comments: No ROGELIO 2/2 anal closure.   Musculoskeletal: Normal range of motion.   Lymphadenopathy: No inguinal adenopathy noted on the right or left side.   Neurological: He is alert and oriented to person, place, and time. He has normal strength.   Skin: Skin is warm, dry and intact.     Psychiatric: He has a normal mood and affect. His behavior is normal. Judgment and thought content normal.       Assessment:       1. Elevated PSA    2. S/P TURP        Plan:         I spent 25 minutes with the patient of which more than half was spent in direct consultation with the patient in regards to our treatment and plan.    Education and recommendations of today's plan of care including home remedies.  We discussed his LUTS and contributory factors.  Reviewed his PSA results. Discussed possible causes of rise.  Will repeat today; discuss with Dr. Mota and call him this evening regarding plan of care.  Reassurance; continue Finasteride daily

## 2018-06-19 ENCOUNTER — HOSPITAL ENCOUNTER (OUTPATIENT)
Dept: RADIOLOGY | Facility: HOSPITAL | Age: 68
Discharge: HOME | End: 2018-06-19
Attending: NURSE PRACTITIONER
Payer: MEDICARE

## 2018-06-19 DIAGNOSIS — R97.20 RISING PSA LEVEL: ICD-10-CM

## 2018-06-19 DIAGNOSIS — R97.20 ELEVATED PSA: ICD-10-CM

## 2018-06-19 DIAGNOSIS — D49.59 NEOPLASM OF PROSTATE: ICD-10-CM

## 2018-06-19 PROCEDURE — 72197 MRI PELVIS W/O & W/DYE: CPT | Mod: TC

## 2018-06-19 PROCEDURE — 72197 MRI PELVIS W/O & W/DYE: CPT | Mod: 26,,, | Performed by: RADIOLOGY

## 2018-06-19 PROCEDURE — A9585 GADOBUTROL INJECTION: HCPCS | Performed by: NURSE PRACTITIONER

## 2018-06-19 PROCEDURE — 25500020 PHARM REV CODE 255: Performed by: NURSE PRACTITIONER

## 2018-06-19 RX ORDER — GADOBUTROL 604.72 MG/ML
10 INJECTION INTRAVENOUS
Status: COMPLETED | OUTPATIENT
Start: 2018-06-19 | End: 2018-06-19

## 2018-06-19 RX ADMIN — GADOBUTROL 10 ML: 604.72 INJECTION INTRAVENOUS at 01:06

## 2018-06-22 ENCOUNTER — TELEPHONE (OUTPATIENT)
Dept: UROLOGY | Facility: CLINIC | Age: 68
End: 2018-06-22

## 2018-06-22 DIAGNOSIS — R97.20 ELEVATED PSA: Primary | ICD-10-CM

## 2018-06-22 DIAGNOSIS — Z90.79 S/P TURP: ICD-10-CM

## 2018-06-22 DIAGNOSIS — R93.89 ABNORMAL MRI: ICD-10-CM

## 2018-06-22 RX ORDER — CIPROFLOXACIN 500 MG/1
TABLET ORAL
Qty: 4 TABLET | Refills: 0 | Status: SHIPPED | OUTPATIENT
Start: 2018-06-22 | End: 2018-06-26

## 2018-06-22 NOTE — TELEPHONE ENCOUNTER
Called and discussed plan of care;  He going to have IR do Ct guided prostate biopsy.  I told him Dr. Mota had him set up for July 6th at 8:30am.   He going to stop all nsaids ASA products 7 days before.   He going fast after midnight.  cipro given on schedule. Night before, morning of, night after and then next am.    Will set up PT/PTT prior to procedure.  Plan to call and remind him

## 2018-06-27 ENCOUNTER — TELEPHONE (OUTPATIENT)
Dept: UROLOGY | Facility: CLINIC | Age: 68
End: 2018-06-27

## 2018-06-27 PROBLEM — R07.9 CHEST PAIN: Status: ACTIVE | Noted: 2018-06-27

## 2018-06-28 DIAGNOSIS — E08.22 DIABETES MELLITUS DUE TO UNDERLYING CONDITION WITH DIABETIC CHRONIC KIDNEY DISEASE, UNSPECIFIED CKD STAGE, UNSPECIFIED WHETHER LONG TERM INSULIN USE: ICD-10-CM

## 2018-06-28 DIAGNOSIS — R97.20 ELEVATED PSA: Primary | ICD-10-CM

## 2018-06-28 DIAGNOSIS — Z01.818 PREOP TESTING: ICD-10-CM

## 2018-06-28 NOTE — PROGRESS NOTES
Patient to have CT guide prostate biopsy in IR.  Needs PT checked before.  Currently in the hospital

## 2018-06-29 ENCOUNTER — TELEPHONE (OUTPATIENT)
Dept: UROLOGY | Facility: CLINIC | Age: 68
End: 2018-06-29

## 2018-06-29 DIAGNOSIS — R97.20 ELEVATED PSA: Primary | ICD-10-CM

## 2018-06-29 NOTE — TELEPHONE ENCOUNTER
Spoke with him about recent events and if he coming off his blood thinners.  He is seeing Dr. Dao on Monday.  This procedure may have to be postponed.

## 2018-07-02 ENCOUNTER — TELEPHONE (OUTPATIENT)
Dept: UROLOGY | Facility: CLINIC | Age: 68
End: 2018-07-02

## 2018-07-02 NOTE — TELEPHONE ENCOUNTER
----- Message from Floridalma Arias NP sent at 6/29/2018 12:21 PM CDT -----  Dr. Dao,   Mr. Charlton is scheduled to have a CT guided Prostate Biopsy on July 6th in Interventional Radiology. He has a rising PSA and PI-Rads 4 lesion on MRI. They would like for him be off blood thinners for the procedure (daily ASA and Plavix).    Now due to these recent past days events will this be possible?   Thank you for advise.    Floridalma Arias NP  [facility].

## 2018-07-02 NOTE — TELEPHONE ENCOUNTER
Will wait to hear from Dr. Dao regarding his blood thinning meds.  Unless he can hold them off, you may have to postpone his prostate bx indefinitely and follow him with serial PSA.

## 2018-07-03 ENCOUNTER — TELEPHONE (OUTPATIENT)
Dept: UROLOGY | Facility: CLINIC | Age: 68
End: 2018-07-03

## 2018-07-03 DIAGNOSIS — R97.20 ELEVATED PSA: Primary | ICD-10-CM

## 2018-07-03 NOTE — TELEPHONE ENCOUNTER
Spoke with Mr. Charlton,  He has to stay on plavix at least until Nov 2/2 stent placement.  I told him that  said that we can post pone the procedure and monitor his PSA's.   Reassurance was given.  I will recheck his PSA 6 months  He voiced understanding

## 2018-07-26 ENCOUNTER — OFFICE VISIT (OUTPATIENT)
Dept: DERMATOLOGY | Facility: CLINIC | Age: 68
End: 2018-07-26
Payer: MEDICARE

## 2018-07-26 DIAGNOSIS — L57.0 MULTIPLE ACTINIC KERATOSES: ICD-10-CM

## 2018-07-26 DIAGNOSIS — Z86.007 HISTORY OF SQUAMOUS CELL CARCINOMA IN SITU (SCCIS) OF SKIN: ICD-10-CM

## 2018-07-26 DIAGNOSIS — D48.5 NEOPLASM OF UNCERTAIN BEHAVIOR OF SKIN: Primary | ICD-10-CM

## 2018-07-26 PROCEDURE — 99999 PR PBB SHADOW E&M-EST. PATIENT-LVL IV: CPT | Mod: PBBFAC,,, | Performed by: PATHOLOGY

## 2018-07-26 PROCEDURE — 11100 PR BIOPSY OF SKIN LESION: CPT | Mod: 59,S$GLB,, | Performed by: PATHOLOGY

## 2018-07-26 PROCEDURE — 99213 OFFICE O/P EST LOW 20 MIN: CPT | Mod: 25,S$GLB,, | Performed by: PATHOLOGY

## 2018-07-26 PROCEDURE — 17000 DESTRUCT PREMALG LESION: CPT | Mod: S$GLB,,, | Performed by: PATHOLOGY

## 2018-07-26 PROCEDURE — 88305 TISSUE EXAM BY PATHOLOGIST: CPT | Performed by: PATHOLOGY

## 2018-07-26 NOTE — PATIENT INSTRUCTIONS
Shave Biopsy Wound Care    Your doctor has performed a shave biopsy today.  A band aid and vaseline ointment has been placed over the site.  This should remain in place for 24 hours.  It is recommended that you keep the area dry for the first 24 hours.  After 24 hours, you may remove the band aid and wash the area with warm soap and water and apply Vaseline jelly.  Many patients prefer to use Neosporin or Bacitracin ointment.  This is acceptable; however, know that you can develop an allergy to this medication even if you have used it safely for years.  It is important to keep the area moist.  Letting it dry out and get air slows healing time, and will worsen the scar.  Band aid is optional after first 24 hours.      If you notice increasing redness, tenderness, pain, or yellow drainage at the biopsy site, please notify your doctor.  These are signs of an infection.    If your biopsy site is bleeding, apply firm pressure for 15 minutes straight.  Repeat for another 15 minutes, if it is still bleeding.   If the surgical site continues to bleed, then please contact your doctor.      [facility] [place] [address]/ [phone] FAX/ [email or web address]      CRYOSURGERY      Your doctor has used a method called cryosurgery to treat your skin condition. Cryosurgery refers to the use of very cold substances to treat a variety of skin conditions such as warts, pre-skin cancers, molluscum contagiosum, sun spots, and several benign growths. The substance we use in cryosurgery is liquid nitrogen and is so cold (-195 degrees Celsius) that is burns when administered.     Following treatment in the office, the skin may immediately burn and become red. You may find the area around the lesion is affected as well. It is sometimes necessary to treat not only the lesion, but a small area of the surrounding normal skin to achieve a good response.     A blister, and even a blood filled blister, may form  after treatment.   This is a normal response. If the blister is painful, it is acceptable to sterilize a needle and with rubbing alcohol and gently pop the blister. It is important that you gently wash the area with soap and warm water as the blister fluid may contain wart virus if a wart was treated. Do no remove the roof of the blister.     The area treated can take anywhere from 1-3 weeks to heal. Healing time depends on the kind skin lesion treated, the location, and how aggressively the lesion was treated. It is recommended that the areas treated are covered with Vaseline or bacitracin ointment and a band-aid. If a band-aid is not practical, just ointment applied several times per day will do. Keeping these areas moist will speed the healing time.    Treatment with liquid nitrogen can leave a scar. In dark skin, it may be a light or dark scar, in light skin it may be a white or pink scar. These will generally fade with time, but may never go away completely.     If you have any concerns after your treatment, please feel free to call the office.       [facility] [place] [address]/ [phone] FAX/ [email or web address]

## 2018-07-26 NOTE — PROGRESS NOTES
"  Subjective:       Patient ID:  Jarrett Charlton Jr. is a 68 y.o. male who presents for   Chief Complaint   Patient presents with    Skin Check     UBSE     HPI  Pt with h/o multiple NMSCs - 5 lesions SCCIS biopsied from bilateral upper extremities over the last 2 visits.  Last visit 5/31:  FINAL PATHOLOGIC DIAGNOSIS  1. Skin, left forearm, shave biopsy:  - HYPERTROPHIC ACTINIC KERATOSIS.  MICROSCOPIC DESCRIPTION: Sections show a hyperplastic epidermis with atypia and maturation disarray within  the lowermost epidermal layers and epidermal hyperplasia. The underlying papillary dermis shows solar elastosis.  2. Skin, left wrist, shave biopsy:  - SQUAMOUS CELL CARCINOMA IN SITU.  - THE TUMOR EXTENDS TO THE LATERAL BIOPSY MARGINS.3. Skin, left base of thumb, shave biopsy:  - SQUAMOUS CELL CARCINOMA IN SITU.  - THE TUMOR FOCALLY EXTENDS TO A LATERAL BIOPSY MARGIN.    4. Skin, left hand radial, shave biopsy:  - SQUAMOUS CELL CARCINOMA IN SITU.  - THE TUMOR EXTENDS TO A LATERAL BIOPSY MARGIN.    5. Skin, left hand ulnar, shave biopsy:  - SQUAMOUS CELL CARCINOMA IN SITU.  - MARGINS ARE NEGATIVE IN THE PLANES OF SECTION.    6. Skin, right arm near elbow, shave biopsy:  - SQUAMOUS CELL CARCINOMA IN SITU.  - MARGINS ARE NEGATIVE IN THE PLANES OF SECTION.    Used Efudex cream for 3 days to right forearm and hand - got red and "raw" so he stopped it.  Biopsy sites to right elbow and left arm healing.  Has non-healing area to left inferior auricular area that is irritated by shaving x 2 months.  No prior tx.     Review of Systems   Constitutional: Negative for fever and chills.   Skin: Negative for itching, rash and dry skin.   Hematologic/Lymphatic: Bruises/bleeds easily.        Objective:    Physical Exam   Constitutional: He appears well-developed and well-nourished.   Neurological: He is alert.   Skin:   Areas Examined (abnormalities noted in diagram):   Scalp / Hair Palpated and Inspected  Head / Face Inspection " Performed  Neck Inspection Performed  Chest / Axilla Inspection Performed  RUE Inspected  LUE Inspection Performed                   Diagram Legend     Erythematous scaling macule/papule c/w actinic keratosis       Vascular papule c/w angioma      Pigmented verrucoid papule/plaque c/w seborrheic keratosis      Yellow umbilicated papule c/w sebaceous hyperplasia      Irregularly shaped tan macule c/w lentigo     1-2 mm smooth white papules consistent with Milia      Movable subcutaneous cyst with punctum c/w epidermal inclusion cyst      Subcutaneous movable cyst c/w pilar cyst      Firm pink to brown papule c/w dermatofibroma      Pedunculated fleshy papule(s) c/w skin tag(s)      Evenly pigmented macule c/w junctional nevus     Mildly variegated pigmented, slightly irregular-bordered macule c/w mildly atypical nevus      Flesh colored to evenly pigmented papule c/w intradermal nevus       Pink pearly papule/plaque c/w basal cell carcinoma      Erythematous hyperkeratotic cursted plaque c/w SCC      Surgical scar with no sign of skin cancer recurrence      Open and closed comedones      Inflammatory papules and pustules      Verrucoid papule consistent consistent with wart     Erythematous eczematous patches and plaques     Dystrophic onycholytic nail with subungual debris c/w onychomycosis     Umbilicated papule    Erythematous-base heme-crusted tan verrucoid plaque consistent with inflamed seborrheic keratosis     Erythematous Silvery Scaling Plaque c/w Psoriasis     See annotation      Assessment / Plan:   Rule Out - Biopsy 1: Basal Cell Carcinoma versus Other.        Pathology Orders:     Normal Orders This Visit    Tissue Specimen To Pathology, Dermatology     Questions:    Directional Terms:  Other(comment)    Left    Inferior    Clinical information:  eroded pearly plaque; r/o BCC    Specific Site:  auricular        Neoplasm of uncertain behavior of skin  -     Tissue Specimen To Pathology,  Dermatology    Shave biopsy procedure note:    Shave biopsy performed after verbal consent including risk of infection, scar, recurrence, need for additional treatment of site. Area prepped with alcohol, anesthetized with approximately 1.0cc of 1% lidocaine with epinephrine. Lesional tissue shaved with razor blade. Hemostasis achieved with application of aluminum chloride followed by hyfrecation. No complications. Dressing applied. Wound care explained.        Multiple actinic keratoses - Cryosurgery Procedure Note    Verbal consent from the patient is obtained including, but not limited to, risk of hypopigmentation/hyperpigmentation, scar, recurrence of lesion. The patient is aware of the precancerous quality and need for treatment of these lesions. Liquid nitrogen cryosurgery is applied to the 1 actinic keratosis, as detailed in the physical exam, to produce a freeze injury. The patient is aware that blisters may form and is instructed on wound care with gentle cleansing and use of vaseline ointment to keep moist until healed. The patient is supplied a handout on cryosurgery and is instructed to call if lesions do not completely resolve.    Efudex cream bid x 2 weeks to forearms and hands.        History of squamous cell carcinoma in situ (SCCIS) of skin - multiple biopsy proven areas that await definitive treatment.  Efudex cream bid x 2 weeks as above.             Follow-up in about 2 months (around 9/26/2018).

## 2018-08-21 ENCOUNTER — PROCEDURE VISIT (OUTPATIENT)
Dept: DERMATOLOGY | Facility: CLINIC | Age: 68
End: 2018-08-21
Payer: MEDICARE

## 2018-08-21 VITALS
DIASTOLIC BLOOD PRESSURE: 83 MMHG | BODY MASS INDEX: 26.44 KG/M2 | HEIGHT: 74 IN | WEIGHT: 206 LBS | HEART RATE: 90 BPM | SYSTOLIC BLOOD PRESSURE: 129 MMHG

## 2018-08-21 DIAGNOSIS — C44.41 BASAL CELL CARCINOMA OF LEFT SIDE OF NECK: Primary | ICD-10-CM

## 2018-08-21 PROCEDURE — 13152 CMPLX RPR E/N/E/L 2.6-7.5 CM: CPT | Mod: 51,S$GLB,, | Performed by: DERMATOLOGY

## 2018-08-21 PROCEDURE — 17312 MOHS ADDL STAGE: CPT | Mod: S$GLB,,, | Performed by: DERMATOLOGY

## 2018-08-21 PROCEDURE — 17311 MOHS 1 STAGE H/N/HF/G: CPT | Mod: S$GLB,,, | Performed by: DERMATOLOGY

## 2018-08-21 PROCEDURE — 99499 UNLISTED E&M SERVICE: CPT | Mod: S$GLB,,, | Performed by: DERMATOLOGY

## 2018-08-21 NOTE — PROGRESS NOTES
PROCEDURE: Mohs' Micrographic Surgery    INDICATION: Biopsy-proven skin cancer of cosmetically and functionally important areas, including head, neck, genital, hand, foot, or areas known for having difficulty in healing, such as the lower anterior legs. Tumors with aggressive clinical behavior (rapidly growing, greater than 1 cm in diameter). Tumor with ill-defined borders.    REFERRING MD: Florencia Koch M.D.    CASE NUMBER:     ANESTHETIC: 7 cc 0.5% Lidocaine with Epi 1:200,000 mixed 1:1 with 0.5% Bupivacaine    SURGICAL PREP: Hibiclens    SURGEON: Maurice Kimball MD    ASSISTANTS: Ibis Rodriguez PA-C and Carli Jefferson,     PREOPERATIVE DIAGNOSIS: basal cell carcinoma    POSTOPERATIVE DIAGNOSIS: basal cell carcinoma    PATHOLOGIC DIAGNOSIS: basal cell carcinoma- superficial, nodular    HISTOLOGY OF SPECIMENS IN FIRST STAGE:   Tumor Type: Tumor seen. Superficial basal cell carcinoma: Foci of basaloid cells with peripheral palisading and focal retraction artifact arising along the dermoepidermal junction and extending into the papillary dermis.  Nodular basal cell carcinoma: Nodular tumor in dermis composed of basaloid cells exhibiting peripheral palisading and retraction artifact.   Depth of Invasion: epidermis and dermis  Perineural Invasion: No    HISTOLOGY OF SPECIMENS IN SUBSEQUENT STAGES:  · Tumor Type: No tumor seen.    STAGES OF MOHS' SURGERY PERFORMED: 2    TUMOR-FREE PLANE ACHIEVED: Yes    HEMOSTASIS: electrocoagulation     SPECIMENS: 3 (2 in stage A and 1 in stage B)    LOCATION: left inferior auricular. Patient verified location with hand held mirror.    INITIAL LESION SIZE: 1.1 x 1.7 cm    FINAL DEFECT SIZE: 1.5 x 2.5 cm    WOUND REPAIR/DISPOSITION: The patient tolerated Mohs' Micrographic Surgery for a basal cell carcinoma very well. When the tumor was completely removed, a repair of the surgical defect was undertaken.      PROCEDURE: Complex Linear Repair    INDICATION: Status post  "Mohs' Micrographic Surgery for basal cell carcinoma.    CASE NUMBER:     SURGEON: Maurice Kimball MD    ASSISTANTS: Ibis Rodriguez PA-C, Julisa Calderón MA and Dakoat Rosa    ANESTHETIC: 3 cc 1% Lidocaine with Epinephrine 1:100,000    SURGICAL PREP: Hibiclens    LOCATION: left inferior auricular    DEFECT SIZE: 1.5 x 2.5 cm    WOUND REPAIR/DISPOSITION:  After the patient's carcinoma had been completely removed with Mohs' Micrographic Surgery, a repair of the surgical defect was undertaken. The patient was returned to the operating suite where the area of left inferior auricular was prepped, draped, and anesthetized in the usual sterile fashion. The wound was widely undermined in all directions. Then, electrocoagulation was used to obtain meticulous hemostasis. 4-0 Vicryl buried vertical mattress sutures were placed into the subcutaneous and dermal plane to close the wound and danial the cutaneous wound edge. Bilateral dog ears were identified and were removed by a standard Burow's triangle technique. The cutaneous wound edges were closed using running 4-0 Prolene suture.    The patient tolerated the procedure well.    The area was cleaned and dressed appropriately and the patient was given wound care instructions, as well as appointment for follow-up evaluation. Patient declined pain medication.    LENGTH OF REPAIR: 4 cm    Vitals:    08/21/18 1051 08/21/18 1310   BP: 113/72 129/83   BP Location: Right arm Left arm   Patient Position:  Sitting   BP Method:  Small (Automatic)   Pulse: 84 90   Weight: 93.4 kg (206 lb)    Height: 6' 2" (1.88 m)          "

## 2018-08-28 ENCOUNTER — OFFICE VISIT (OUTPATIENT)
Dept: DERMATOLOGY | Facility: CLINIC | Age: 68
End: 2018-08-28
Payer: MEDICARE

## 2018-08-28 DIAGNOSIS — Z09 POSTOP CHECK: Primary | ICD-10-CM

## 2018-08-28 PROCEDURE — 99999 PR PBB SHADOW E&M-EST. PATIENT-LVL III: CPT | Mod: PBBFAC,,, | Performed by: DERMATOLOGY

## 2018-08-28 PROCEDURE — 99024 POSTOP FOLLOW-UP VISIT: CPT | Mod: S$GLB,,, | Performed by: DERMATOLOGY

## 2018-08-28 NOTE — PROGRESS NOTES
68 y.o. male patient is here for suture removal following Mohs' surgery.    Patient reports no problems.    WOUND PE:  The L inferior auricular sutures intact. Wound healing well. Good skin edges. No signs or symptoms of infection.    IMPRESSION:  Healing operative site from Mohs' surgery, BCC L inferior auricular s/p Mohs with CLC, postop day #7.    PLAN:  Sutures removed today. Steri-strips applied.  Continue wound care.  Keep moist with Aquaphor.    RTC:  In 3-6 months with Florencia Koch M.D. for skin check or sooner if new concern arises.

## 2018-09-07 ENCOUNTER — TELEPHONE (OUTPATIENT)
Dept: PRIMARY CARE CLINIC | Facility: CLINIC | Age: 68
End: 2018-09-07

## 2018-09-07 DIAGNOSIS — M10.9 GOUT, UNSPECIFIED CAUSE, UNSPECIFIED CHRONICITY, UNSPECIFIED SITE: Primary | ICD-10-CM

## 2018-09-07 RX ORDER — INDOMETHACIN 50 MG/1
50 CAPSULE ORAL 2 TIMES DAILY WITH MEALS
Qty: 30 CAPSULE | Refills: 0 | Status: SHIPPED | OUTPATIENT
Start: 2018-09-07 | End: 2019-01-11

## 2018-09-07 NOTE — TELEPHONE ENCOUNTER
----- Message from Lois San sent at 9/7/2018  2:08 PM CDT -----  Contact: [phone]  Patient is requesting a call back from the nurse stated he have flare up left wrist and elbow.  Please call the patient upon request at phone number [phone].    Patient will be using   [facility] [address] [facility] [address] [address]  Phone: [phone] Fax: [phone]

## 2018-09-25 ENCOUNTER — OFFICE VISIT (OUTPATIENT)
Dept: DERMATOLOGY | Facility: CLINIC | Age: 68
End: 2018-09-25
Payer: MEDICARE

## 2018-09-25 DIAGNOSIS — Z85.828 HISTORY OF NONMELANOMA SKIN CANCER: ICD-10-CM

## 2018-09-25 DIAGNOSIS — D48.5 NEOPLASM OF UNCERTAIN BEHAVIOR OF SKIN: Primary | ICD-10-CM

## 2018-09-25 DIAGNOSIS — L57.0 MULTIPLE ACTINIC KERATOSES: ICD-10-CM

## 2018-09-25 PROCEDURE — 11101 PR BIOPSY, EACH ADDED LESION: CPT | Mod: PBBFAC | Performed by: PATHOLOGY

## 2018-09-25 PROCEDURE — 11101 PR BIOPSY, EACH ADDED LESION: CPT | Mod: S$PBB,,, | Performed by: PATHOLOGY

## 2018-09-25 PROCEDURE — 11100 PR BIOPSY OF SKIN LESION: CPT | Mod: PBBFAC | Performed by: PATHOLOGY

## 2018-09-25 PROCEDURE — 99212 OFFICE O/P EST SF 10 MIN: CPT | Mod: S$PBB,25,, | Performed by: PATHOLOGY

## 2018-09-25 PROCEDURE — 11100 PR BIOPSY OF SKIN LESION: CPT | Mod: S$PBB,51,59, | Performed by: PATHOLOGY

## 2018-09-25 PROCEDURE — 88305 TISSUE EXAM BY PATHOLOGIST: CPT | Performed by: PATHOLOGY

## 2018-09-25 PROCEDURE — 99213 OFFICE O/P EST LOW 20 MIN: CPT | Mod: PBBFAC | Performed by: PATHOLOGY

## 2018-09-25 PROCEDURE — 1101F PT FALLS ASSESS-DOCD LE1/YR: CPT | Mod: CPTII,,, | Performed by: PATHOLOGY

## 2018-09-25 PROCEDURE — 17004 DESTROY PREMAL LESIONS 15/>: CPT | Mod: PBBFAC | Performed by: PATHOLOGY

## 2018-09-25 PROCEDURE — 99999 PR PBB SHADOW E&M-EST. PATIENT-LVL III: CPT | Mod: PBBFAC,,, | Performed by: PATHOLOGY

## 2018-09-25 PROCEDURE — 17004 DESTROY PREMAL LESIONS 15/>: CPT | Mod: S$PBB,59,, | Performed by: PATHOLOGY

## 2018-09-25 NOTE — PATIENT INSTRUCTIONS
CRYOSURGERY      Your doctor has used a method called cryosurgery to treat your skin condition. Cryosurgery refers to the use of very cold substances to treat a variety of skin conditions such as warts, pre-skin cancers, molluscum contagiosum, sun spots, and several benign growths. The substance we use in cryosurgery is liquid nitrogen and is so cold (-195 degrees Celsius) that is burns when administered.     Following treatment in the office, the skin may immediately burn and become red. You may find the area around the lesion is affected as well. It is sometimes necessary to treat not only the lesion, but a small area of the surrounding normal skin to achieve a good response.     A blister, and even a blood filled blister, may form after treatment.   This is a normal response. If the blister is painful, it is acceptable to sterilize a needle and with rubbing alcohol and gently pop the blister. It is important that you gently wash the area with soap and warm water as the blister fluid may contain wart virus if a wart was treated. Do no remove the roof of the blister.     The area treated can take anywhere from 1-3 weeks to heal. Healing time depends on the kind skin lesion treated, the location, and how aggressively the lesion was treated. It is recommended that the areas treated are covered with Vaseline or bacitracin ointment and a band-aid. If a band-aid is not practical, just ointment applied several times per day will do. Keeping these areas moist will speed the healing time.    Treatment with liquid nitrogen can leave a scar. In dark skin, it may be a light or dark scar, in light skin it may be a white or pink scar. These will generally fade with time, but may never go away completely.     If you have any concerns after your treatment, please feel free to call the office.       [address] [place] [address]/ [phone] FAX/ [email or web address]     Shave Biopsy Wound Care    Your  doctor has performed a shave biopsy today.  A band aid and vaseline ointment has been placed over the site.  This should remain in place for 24 hours.  It is recommended that you keep the area dry for the first 24 hours.  After 24 hours, you may remove the band aid and wash the area with warm soap and water and apply Vaseline jelly.  Many patients prefer to use Neosporin or Bacitracin ointment.  This is acceptable; however, know that you can develop an allergy to this medication even if you have used it safely for years.  It is important to keep the area moist.  Letting it dry out and get air slows healing time, and will worsen the scar.  Band aid is optional after first 24 hours.      If you notice increasing redness, tenderness, pain, or yellow drainage at the biopsy site, please notify your doctor.  These are signs of an infection.    If your biopsy site is bleeding, apply firm pressure for 15 minutes straight.  Repeat for another 15 minutes, if it is still bleeding.   If the surgical site continues to bleed, then please contact your doctor.      [address] [place] [address]/ [phone] FAX/ [email or web address]

## 2018-09-25 NOTE — PROGRESS NOTES
Subjective:       Patient ID:  Jarrett Charlton Jr. is a 68 y.o. male who presents for   Chief Complaint   Patient presents with    Follow-up     Efudex f/u     HPI  Pt with h/o multiple NMSCs - several SCCIS to forearms/hands.  Treated with Efudex bid for 2 weeks with significant inflammatory response.  Has a few residual scaly thick papules to left forearm and hand today.  Also with persistent raised scaly spot to left cheek.  Treated 1 month ago bor BCC left preauricular area with Mohs.  Healing well.      Review of Systems   Constitutional: Negative for fever, chills, weight loss, weight gain, fatigue, night sweats and malaise.   Skin: Positive for daily sunscreen use and activity-related sunscreen use. Negative for itching, rash and recent sunburn.   Hematologic/Lymphatic: Bruises/bleeds easily.        Objective:    Physical Exam   Constitutional: He appears well-developed and well-nourished.   Neurological: He is alert.   Skin:   Areas Examined (abnormalities noted in diagram):   Scalp / Hair Palpated and Inspected  Head / Face Inspection Performed  Neck Inspection Performed  RUE Inspected  LUE Inspection Performed                       Diagram Legend     Erythematous scaling macule/papule c/w actinic keratosis       Vascular papule c/w angioma      Pigmented verrucoid papule/plaque c/w seborrheic keratosis      Yellow umbilicated papule c/w sebaceous hyperplasia      Irregularly shaped tan macule c/w lentigo     1-2 mm smooth white papules consistent with Milia      Movable subcutaneous cyst with punctum c/w epidermal inclusion cyst      Subcutaneous movable cyst c/w pilar cyst      Firm pink to brown papule c/w dermatofibroma      Pedunculated fleshy papule(s) c/w skin tag(s)      Evenly pigmented macule c/w junctional nevus     Mildly variegated pigmented, slightly irregular-bordered macule c/w mildly atypical nevus      Flesh colored to evenly pigmented papule c/w intradermal nevus       Pink pearly  papule/plaque c/w basal cell carcinoma      Erythematous hyperkeratotic cursted plaque c/w SCC      Surgical scar with no sign of skin cancer recurrence      Open and closed comedones      Inflammatory papules and pustules      Verrucoid papule consistent consistent with wart     Erythematous eczematous patches and plaques     Dystrophic onycholytic nail with subungual debris c/w onychomycosis     Umbilicated papule    Erythematous-base heme-crusted tan verrucoid plaque consistent with inflamed seborrheic keratosis     Erythematous Silvery Scaling Plaque c/w Psoriasis     See annotation      Assessment / Plan:   Rule Out - Biopsy 1: Hypertrophic Actinic Keratosis versus Squamous Cell Carcinoma versus Squamous Cell Carcinoma in Situ.    Rule Out - Biopsy 2: Hypertrophic Actinic Keratosis versus Squamous Cell Carcinoma versus Squamous Cell Carcinoma in Situ.    Rule Out - Biopsy 3: Hypertrophic Actinic Keratosis versus Squamous Cell Carcinoma versus Squamous Cell Carcinoma in Situ.        Pathology Orders:     Normal Orders This Visit    Tissue Specimen To Pathology, Dermatology     Questions:    Directional Terms:  Other(comment)    Left    Clinical information:  hyperkeratotic erythematous papule; r/o HAK vs SCCIS vs SCC    Specific Site:  middle arm    Tissue Specimen To Pathology, Dermatology     Questions:    Directional Terms:  Other(comment)    Left    Clinical information:  Hyperkeratotic erythematous papule; r/o HAK vs SCC vs SCCIS    Specific Site:  hand proximal    Tissue Specimen To Pathology, Dermatology     Questions:    Directional Terms:  Other(comment)    Left    Clinical information:  Hyperkeratotic erythematous papule; r/o HAK vs SCC vs SCCIS    Specific Site:  hand distal        Neoplasm of uncertain behavior of skin  -     Tissue Specimen To Pathology, Dermatology  -     Tissue Specimen To Pathology, Dermatology  -     Tissue Specimen To Pathology, Dermatology    Shave biopsy procedure  note:    Shave biopsies x 3 performed after verbal consent including risk of infection, scar, recurrence, need for additional treatment of site. Area prepped with alcohol, anesthetized with approximately 1.0cc of 1% lidocaine with epinephrine. Lesional tissue shaved with razor blade. Hemostasis achieved with application of aluminum chloride followed by hyfrecation. No complications. Dressing applied. Wound care explained.        Multiple actinic keratoses - Cryosurgery Procedure Note    Verbal consent from the patient is obtained including, but not limited to, risk of hypopigmentation/hyperpigmentation, scar, recurrence of lesion. The patient is aware of the precancerous quality and need for treatment of these lesions. Liquid nitrogen cryosurgery is applied to the 16 actinic keratoses, as detailed in the physical exam, to produce a freeze injury. The patient is aware that blisters may form and is instructed on wound care with gentle cleansing and use of vaseline ointment to keep moist until healed. The patient is supplied a handout on cryosurgery and is instructed to call if lesions do not completely resolve.    Good response to Efudex.      History of nonmelanoma skin cancer - no evidence of recurrence             Follow-up in about 10 weeks (around 12/4/2018).

## 2018-10-09 ENCOUNTER — PROCEDURE VISIT (OUTPATIENT)
Dept: DERMATOLOGY | Facility: CLINIC | Age: 68
End: 2018-10-09
Payer: MEDICARE

## 2018-10-09 DIAGNOSIS — C44.629 SCC (SQUAMOUS CELL CARCINOMA), HAND, LEFT: Primary | ICD-10-CM

## 2018-10-09 PROCEDURE — 11100 PR BIOPSY OF SKIN LESION: CPT | Mod: S$PBB,,, | Performed by: PATHOLOGY

## 2018-10-09 PROCEDURE — 88305 TISSUE EXAM BY PATHOLOGIST: CPT | Mod: 26,,, | Performed by: PATHOLOGY

## 2018-10-09 PROCEDURE — 11100 PR BIOPSY OF SKIN LESION: CPT | Mod: PBBFAC | Performed by: PATHOLOGY

## 2018-10-09 PROCEDURE — 88305 TISSUE EXAM BY PATHOLOGIST: CPT | Performed by: PATHOLOGY

## 2018-10-09 PROCEDURE — 99499 UNLISTED E&M SERVICE: CPT | Mod: S$PBB,,, | Performed by: PATHOLOGY

## 2018-10-09 NOTE — PROGRESS NOTES
Subjective:       Patient ID:  Jarrett Charlton Jr. is a 68 y.o. male who presents for No chief complaint on file.    HPI  Pt with h/o multiple NMSCs here today for treatment of biopsy proven SCC left hand:    FINAL PATHOLOGIC DIAGNOSIS  1. Skin, left middle arm, shave biopsy:  - HYPERTROPHIC ACTINIC KERATOSIS WITH FOCAL TRANSITION TO SQUAMOUS CELL CARCINOMA IN  SITU.  - FULL THICKNESS SQUAMOUS ATYPIA FOCALLY EXTENDS TO A LATERAL BIOPSY MARGIN.      2. Skin, left proximal hand, shave biopsy:  - HYPERTROPHIC ACTINIC KERATOSIS.    3. Skin, left distal hand, shave biopsy:  - SUPERFICIALLY INVASIVE SQUAMOUS CELL CARCINOMA.  - THE TUMOR FOCALLY EXTENDS TO THE DEEP BIOPSY MARGIN.        Review of Systems   Constitutional: Negative for fever and chills.   Skin: Negative for sensitivity to antibiotic ointment, sensitivity to bandage adhesive and tendency to form keloidal scars.   Hematologic/Lymphatic: Bruises/bleeds easily.        Objective:    Physical Exam   Constitutional: He appears well-developed and well-nourished.   Neurological: He is alert.   Skin:   Areas Examined (abnormalities noted in diagram):   RUE Inspected  LUE Inspection Performed  Nails and Digits Inspection Performed                  Diagram Legend     Erythematous scaling macule/papule c/w actinic keratosis       Vascular papule c/w angioma      Pigmented verrucoid papule/plaque c/w seborrheic keratosis      Yellow umbilicated papule c/w sebaceous hyperplasia      Irregularly shaped tan macule c/w lentigo     1-2 mm smooth white papules consistent with Milia      Movable subcutaneous cyst with punctum c/w epidermal inclusion cyst      Subcutaneous movable cyst c/w pilar cyst      Firm pink to brown papule c/w dermatofibroma      Pedunculated fleshy papule(s) c/w skin tag(s)      Evenly pigmented macule c/w junctional nevus     Mildly variegated pigmented, slightly irregular-bordered macule c/w mildly atypical nevus      Flesh colored to evenly  pigmented papule c/w intradermal nevus       Pink pearly papule/plaque c/w basal cell carcinoma      Erythematous hyperkeratotic cursted plaque c/w SCC      Surgical scar with no sign of skin cancer recurrence      Open and closed comedones      Inflammatory papules and pustules      Verrucoid papule consistent consistent with wart     Erythematous eczematous patches and plaques     Dystrophic onycholytic nail with subungual debris c/w onychomycosis     Umbilicated papule    Erythematous-base heme-crusted tan verrucoid plaque consistent with inflamed seborrheic keratosis     Erythematous Silvery Scaling Plaque c/w Psoriasis     See annotation      Assessment / Plan:      Pathology Orders:     Normal Orders This Visit    Tissue Specimen To Pathology, Dermatology     Questions:    Directional Terms:  Other(comment)    Left    Clinical information:  scoop shave and ED&C of biopsy proven SCC    Specific Site:  hand        SCC (squamous cell carcinoma), hand, left  -     Tissue Specimen To Pathology, Dermatology    Shave biopsy procedure note:    Shave biopsy performed after verbal consent including risk of infection, scar, recurrence, need for additional treatment of site. Area prepped with alcohol, anesthetized with approximately 1.0cc of 1% lidocaine with epinephrine. Lesional tissue shaved with razor blade. Hemostasis achieved with application of aluminum chloride followed by hyfrecation. No complications. Dressing applied. Wound care explained.    Pt to wait 1 month for all biopsy sites to heal, then apply Efudex to bilateral hands and forearms bid x 4 weeks.         Follow-up in about 3 months (around 1/9/2019).

## 2018-11-19 RX ORDER — FINASTERIDE 5 MG/1
TABLET, FILM COATED ORAL
Qty: 90 TABLET | Refills: 3 | Status: SHIPPED | OUTPATIENT
Start: 2018-11-19 | End: 2019-06-10

## 2018-11-20 ENCOUNTER — TELEPHONE (OUTPATIENT)
Dept: UROLOGY | Facility: CLINIC | Age: 68
End: 2018-11-20

## 2018-11-20 DIAGNOSIS — R97.20 ELEVATED PSA: Primary | ICD-10-CM

## 2018-11-20 NOTE — TELEPHONE ENCOUNTER
Lab Results   Component Value Date    PSA 2.1 09/15/2014    PSA 2.16 05/13/2013    PSA 1.2 03/19/2012    PSADIAG 3.3 06/15/2018    PSADIAG 3.1 05/08/2018    PSADIAG 1.7 06/05/2017       Elevated PSA  -     Prostate Specific Antigen, Diagnostic; Future; Expected date: 05/23/2019    let me see him with a repeat PSA.

## 2018-11-20 NOTE — TELEPHONE ENCOUNTER
----- Message from Nilam Payne LPN sent at 11/20/2018 10:42 AM CST -----  Contact:  [phone]  He is due in jan with repeat psa, do you want me to just schedule the biopsy? If so please place orders.   ----- Message -----  From: Yajaira Joaquin MA  Sent: 11/20/2018   9:48 AM  To: [facility] Staff    Needs Advice    Reason for call: pt was supposed to have a Trus w/ Bx around July but was not cleared by his cardiologist to stop Plavix. He is good to go now but needs a weeks notice to stop his medication.  The orders are no longer in epic        Communication Preference:  [phone]

## 2018-11-27 ENCOUNTER — OFFICE VISIT (OUTPATIENT)
Dept: UROLOGY | Facility: CLINIC | Age: 68
End: 2018-11-27
Payer: MEDICARE

## 2018-11-27 VITALS
HEIGHT: 74 IN | HEART RATE: 76 BPM | WEIGHT: 200.81 LBS | SYSTOLIC BLOOD PRESSURE: 118 MMHG | DIASTOLIC BLOOD PRESSURE: 80 MMHG | BODY MASS INDEX: 25.77 KG/M2

## 2018-11-27 DIAGNOSIS — Z90.79 S/P TURP: ICD-10-CM

## 2018-11-27 DIAGNOSIS — R97.20 ELEVATED PSA: Primary | ICD-10-CM

## 2018-11-27 DIAGNOSIS — N40.0 BENIGN PROSTATIC HYPERPLASIA WITHOUT LOWER URINARY TRACT SYMPTOMS: ICD-10-CM

## 2018-11-27 PROCEDURE — 99999 PR PBB SHADOW E&M-EST. PATIENT-LVL IV: CPT | Mod: PBBFAC,,, | Performed by: UROLOGY

## 2018-11-27 PROCEDURE — 1101F PT FALLS ASSESS-DOCD LE1/YR: CPT | Mod: CPTII,S$GLB,, | Performed by: UROLOGY

## 2018-11-27 PROCEDURE — 3074F SYST BP LT 130 MM HG: CPT | Mod: CPTII,S$GLB,, | Performed by: UROLOGY

## 2018-11-27 PROCEDURE — 99214 OFFICE O/P EST MOD 30 MIN: CPT | Mod: S$GLB,,, | Performed by: UROLOGY

## 2018-11-27 PROCEDURE — 3079F DIAST BP 80-89 MM HG: CPT | Mod: CPTII,S$GLB,, | Performed by: UROLOGY

## 2018-11-27 NOTE — PROGRESS NOTES
CC:  Elevated PSA, LUTS    Jarrett Charlton Jr. is a 68 y.o. man who is here for the evaluation of difficult urination.  Seen him for elevated PSA.  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  He was not interested in SUDS.  S/p colon surgery and his anus is closed.  Therefore dong TRUS bx of prostate is not feasible.  After discussion, we decided to try finasteride to see whether it will lower his PSA.   He has been on finasteride for the past couple of years.    When he was considered to undergo TURP, we could not do his surgery because he was not able to stop ASA or Plavix due to fresh vascular stent.  Now he got a clearance that he can hold off plavix and ASA.  He is here to discuss his LUTS and elevated PSA.    hx of straining to urinate but not bothered by it.  Even then his urine flow is not strong.  He tried flomax for the past 2 months without significant improvement.  Does feel that he is emptying his bladder completely.  Hx of ulcerative colitis, had colon surgery back in 1985 and his anus was closed.  Has a neuropathy on the right leg.  Hx of sciatic nerve on both sides and received injection on the back.  Hx of diabetes diagnosed 2 years ago.  No family hx of prostate cancer.  He is a .  Denies flank pain, dysuira, hematuria .      Past Medical History:   Diagnosis Date    Basal cell carcinoma     BPH (benign prostatic hyperplasia)     s/p TURP    Carotid stenosis     Chronic kidney disease     CKD (chronic kidney disease)     Claudication     DDD (degenerative disc disease) 10/21/2013    Diabetes mellitus     Diabetes mellitus with renal complications     Disc disease, degenerative, cervical     GERD (gastroesophageal reflux disease)     Gout, chronic     History of ulcerative colitis     s/p colectomy and ileostomy    HLD (hyperlipidemia)     HTN (hypertension)     Ileostomy in place  1982    PVC (premature ventricular contraction)     RBBB     Squamous cell carcinoma 03/08/2018    Left superior helix near insertion    Squamous cell carcinoma 04/12/2018    Left forearm x 5    Ventricular tachycardia      Past Surgical History:   Procedure Laterality Date    Zlqinfnah-Hxkbjglbrez-Go  11/10/2017    Performed by [facility]ado [facility] at [facility] CATH LAB    cardiac stents      CATARACT EXTRACTION Bilateral     CERVICAL FUSION      colectomy and ileostomy      RAJENDRA-TRANSFORAMINAL N/A 6/4/2015    [facility] CATH LAB    RAJENDRA-TRANSFORAMINAL N/A 12/4/2013    Performed [facility] CATH LAB    INJECTION, STEROID, SPINE, LUMBAR, EPIDURAL N/A 10/21/2013    Performed [facility] CATH LAB    INJECTION-STEROID-EPIDURAL N/A 5/27/2014    Performed by [facility] CATH LAB    INJECTION-STEROID-EPIDURAL-LUMBAR N/A 11/30/2015    Performed by [facility] CATH LAB    INJECTION-STEROID-EPIDURAL-LUMBAR N/A 9/17/2014    Performed by [facility] CATH LAB    Left heart cath Left 11/10/2017    Performed by [facility]ado [facility] at [facility] CATH LAB    Percutaneous coronary intervention  11/10/2017    Performed by [facility] at [facility] CATH LAB    TRANSURETHRAL RESECTION OF PROSTATE       Social History     Tobacco Use    Smoking status: Never Smoker    Smokeless tobacco: Never Used   Substance Use Topics    Alcohol use: No    Drug use: No     Family History   Problem Relation Age of Onset    Melanoma Neg Hx     Hypertension Neg Hx     Diabetes Neg Hx     Arthritis Neg Hx      Allergy:  No Known Allergies  Outpatient Encounter Medications as of 11/27/2018   Medication Sig Dispense Refill    ACCU-CHEK SOFTCLIX LANCETS Misc       allopurinol (ZYLOPRIM) 300 MG tablet       aspirin (ECOTRIN) 81 MG EC tablet Take 81 mg by mouth once daily.        BD ALCOHOL  SWABS PadM       blood sugar diagnostic (ONE TOUCH ULTRA TEST) Str USE ONE STRIP TO CHECK GLUCOSE EVERY  each 11    cetirizine (ZYRTEC) 10 MG tablet Take 1 tablet (10 mg total) by mouth once daily. 30 tablet 0    clopidogrel (PLAVIX) 75 mg tablet Take 75 mg by mouth once daily.      finasteride (PROSCAR) 5 mg tablet TAKE 1 TABLET ONE TIME DAILY 90 tablet 3    fluorouracil (EFUDEX) 5 % cream AAA bid x 2-3 weeks to right forearm and hand; avoid unhealed biopsy sites 40 g 1    gabapentin (NEURONTIN) 300 MG capsule Take 2 capsules (600 mg total) by mouth 2 (two) times daily. 120 capsule 11    indomethacin (INDOCIN) 50 MG capsule Take 1 capsule (50 mg total) by mouth 2 (two) times daily with meals. For gout 30 capsule 0    lisinopril (PRINIVIL,ZESTRIL) 5 MG tablet TAKE ONE TABLET BY MOUTH ONCE DAILY 30 tablet 11    metFORMIN (GLUCOPHAGE) 500 MG tablet TAKE 1 TABLET TWICE DAILY 180 tablet 3    metoprolol succinate (TOPROL-XL) 25 MG 24 hr tablet Take 25 mg by mouth once daily.      omeprazole (PRILOSEC) 40 MG capsule TAKE 1 CAPSULE EVERY DAY 90 capsule 3    pravastatin (PRAVACHOL) 40 MG tablet TAKE 1 TABLET EVERY DAY 90 tablet 3     No facility-administered encounter medications on file as of 11/27/2018.      Review of Systems   General ROS: GENERAL:  No weight gain or loss  SKIN:  No rashes or lacerations  HEAD:  No headaches  EYES:  No exophthalmos, jaundice or blindness  EARS:  No dizziness, tinnitus or hearing loss  NOSE:  No changes in smell  MOUTH & THROAT:  No dyskinetic movements or obvious goiter  CHEST:  No shortness of breath, hyperventilation or cough  CARDIOVASCULAR:  No tachycardia or chest pain  ABDOMEN:  No nausea, vomiting, pain, constipation or diarrhea  URINARY:  No frequency, dysuria or sexual dysfunction  ENDOCRINE:  No polydipsia, polyuria  MUSCULOSKELETAL:  No pain or stiffness of the joints  NEUROLOGIC:  No weakness, sensory changes, seizures, confusion, memory loss, tremor or  other abnormal movements  Physical Exam     Vitals:    11/27/18 1024   BP: 118/80   Pulse: 76     General Appearance:  Alert, cooperative, no distress, appears stated age   Head:  Normocephalic, without obvious abnormality, atraumatic   Eyes:  PERRL, conjunctiva/corneas clear, EOM's intact, fundi benign, both eyes   Ears:  Normal TM's and external ear canals, both ears   Nose: Nares normal, septum midline, mucosa normal, no drainage or sinus tenderness   Throat: Lips, mucosa, and tongue normal; teeth and gums normal   Neck: Supple, symmetrical, trachea midline, no adenopathy, thyroid: not enlarged, symmetric, no tenderness/mass/nodules, no carotid bruit or JVD   Back:   Symmetric, no curvature, ROM normal, no CVA tenderness   Lungs:   Clear to auscultation bilaterally, respirations unlabored   Chest Wall:  No tenderness or deformity   Heart:  Regular rate and rhythm, S1, S2 normal, no murmur, rub or gallop   Abdomen:   Soft, non-tender, bowel sounds active all four quadrants,  no masses, no organomegaly  Ileostomy in tact.   Genitalia:  Normal male, normal testicles, normal epididymis, normal vas palpated.       Extremities: Extremities normal, atraumatic, no cyanosis or edema   Pulses: 2+ and symmetric   Skin: Skin color, texture, turgor normal, no rashes or lesions   Lymph nodes: Cervical, supraclavicular, and axillary nodes normal   Neurologic: Normal     Prostate unable to check.     PVR 0 ml per bladder scan.    LABS:  Lab Results   Component Value Date    PSA 2.1 09/15/2014    PSA 2.16 05/13/2013    PSA 1.2 03/19/2012    PSADIAG 4.1 (H) 11/21/2018    PSADIAG 3.3 06/15/2018    PSADIAG 3.1 05/08/2018     Lab Results   Component Value Date    CREATININE 1.2 09/14/2018    CREATININE 1.4 08/26/2018    CREATININE 1.2 06/26/2018     Assessment and Plan:  Jarrett was seen today for elevated psa.    Diagnoses and all orders for this visit:    Elevated PSA  -     MRI Prostate W W/O Contrast; Future    S/P TURP    Benign  prostatic hyperplasia without lower urinary tract symptoms    he has been on Finasteride, and his PSA is still rising.    for his elevated PSA, will further evaluate him with MRI of prostate.  Because his anus is closed off, I wont' be able to perform transrectal ultrasound guided prostate biopsy.  If there is target, will consider CT guided biopsy if feasible.     Discussed options of TURP vs. Laser TURP.  Although laser TURP is preferable since he is on Plavix and ASA, in order to obtain tissues from the prostate, traditional TURP will be best.  Will finalize the plan after MRI of prostate.  I spent 25 minutes with the patient of which more than half was spent in direct consultation with the patient in regards to our treatment and plan.      Follow-up:  Follow-up for MRI of prostate.

## 2018-12-09 ENCOUNTER — HOSPITAL ENCOUNTER (OUTPATIENT)
Dept: RADIOLOGY | Facility: HOSPITAL | Age: 68
Discharge: HOME | End: 2018-12-09
Attending: UROLOGY
Payer: MEDICARE

## 2018-12-09 DIAGNOSIS — R97.20 ELEVATED PSA: ICD-10-CM

## 2018-12-09 PROCEDURE — 72197 MRI PELVIS W/O & W/DYE: CPT | Mod: 26,,, | Performed by: RADIOLOGY

## 2018-12-09 PROCEDURE — A9585 GADOBUTROL INJECTION: HCPCS | Performed by: UROLOGY

## 2018-12-09 PROCEDURE — 72197 MRI PELVIS W/O & W/DYE: CPT | Mod: TC

## 2018-12-09 PROCEDURE — 25500020 PHARM REV CODE 255: Performed by: UROLOGY

## 2018-12-09 RX ORDER — GADOBUTROL 604.72 MG/ML
10 INJECTION INTRAVENOUS
Status: COMPLETED | OUTPATIENT
Start: 2018-12-09 | End: 2018-12-09

## 2018-12-09 RX ADMIN — GADOBUTROL 10 ML: 604.72 INJECTION INTRAVENOUS at 11:12

## 2018-12-13 ENCOUNTER — TELEPHONE (OUTPATIENT)
Dept: DERMATOLOGY | Facility: CLINIC | Age: 68
End: 2018-12-13

## 2018-12-13 ENCOUNTER — OFFICE VISIT (OUTPATIENT)
Dept: DERMATOLOGY | Facility: CLINIC | Age: 68
End: 2018-12-13
Payer: MEDICARE

## 2018-12-13 DIAGNOSIS — D04.9 SQUAMOUS CELL CARCINOMA IN SITU (SCCIS) OF SKIN: ICD-10-CM

## 2018-12-13 DIAGNOSIS — L57.0 MULTIPLE ACTINIC KERATOSES: Primary | ICD-10-CM

## 2018-12-13 PROCEDURE — 1101F PT FALLS ASSESS-DOCD LE1/YR: CPT | Mod: CPTII,S$GLB,, | Performed by: PATHOLOGY

## 2018-12-13 PROCEDURE — 99212 OFFICE O/P EST SF 10 MIN: CPT | Mod: S$GLB,,, | Performed by: PATHOLOGY

## 2018-12-13 PROCEDURE — 99999 PR PBB SHADOW E&M-EST. PATIENT-LVL III: CPT | Mod: PBBFAC,,, | Performed by: PATHOLOGY

## 2018-12-13 RX ORDER — FLUOROURACIL 0.04 G/G
1 CREAM TOPICAL DAILY
Qty: 40 G | Refills: 3 | Status: SHIPPED | OUTPATIENT
Start: 2018-12-13 | End: 2018-12-13

## 2018-12-13 RX ORDER — FLUOROURACIL 0.04 G/G
1 CREAM TOPICAL DAILY
Qty: 80 G | Refills: 3 | Status: SHIPPED | OUTPATIENT
Start: 2018-12-13 | End: 2019-01-11

## 2018-12-13 NOTE — TELEPHONE ENCOUNTER
Spoke with Evonne at the pharmacy and gave script over the phone verbally per BV. Evonne stated that she will call Mr. Charlton to let him know that the script is in.

## 2018-12-13 NOTE — TELEPHONE ENCOUNTER
Spoke with pt and let him know that I will call the pharmacy and see what is happening with the script.

## 2018-12-13 NOTE — TELEPHONE ENCOUNTER
----- Message from Ivette Dallas sent at 12/13/2018  1:54 PM CST -----  Contact: pt   BV- pt - pt is returning the nurses phone call can you please call pt at [phone]. Pt is wanting to speak with the nurse about his prescription pt was seen today     BRYCE

## 2018-12-13 NOTE — TELEPHONE ENCOUNTER
----- Message from Ivette Dallas sent at 12/13/2018  1:54 PM CST -----  Contact: pt   BV- pt - pt is returning the nurses phone call can you please call pt at [phone]. Pt is wanting to speak with the nurse about his prescription pt was seen today     BRYCE

## 2018-12-13 NOTE — TELEPHONE ENCOUNTER
Spoke wit pt and let the pt know that I will give the message to Dr. Koch to send him two tubes of the medication.

## 2018-12-13 NOTE — TELEPHONE ENCOUNTER
----- Message from Alice Ayala sent at 12/13/2018 12:50 PM CST -----  Contact: pt at [phone]  Needs Advice    Reason for call: Pt was seen today.  Problem with medication.  Pt needs  To tubes but wasn't sent in yet.      Communication Preference:call    Additional Information:Call asap.  Pt hung up on me because he was very frustrated.ut

## 2018-12-14 ENCOUNTER — OFFICE VISIT (OUTPATIENT)
Dept: UROLOGY | Facility: CLINIC | Age: 68
End: 2018-12-14
Payer: MEDICARE

## 2018-12-14 ENCOUNTER — TELEPHONE (OUTPATIENT)
Dept: UROLOGY | Facility: CLINIC | Age: 68
End: 2018-12-14

## 2018-12-14 VITALS
HEART RATE: 72 BPM | HEIGHT: 74 IN | SYSTOLIC BLOOD PRESSURE: 147 MMHG | DIASTOLIC BLOOD PRESSURE: 88 MMHG | WEIGHT: 200.81 LBS | BODY MASS INDEX: 25.77 KG/M2

## 2018-12-14 DIAGNOSIS — R97.20 ELEVATED PSA: Primary | ICD-10-CM

## 2018-12-14 DIAGNOSIS — N32.0 BLADDER OUTFLOW OBSTRUCTION: ICD-10-CM

## 2018-12-14 DIAGNOSIS — Z90.79 S/P TURP: ICD-10-CM

## 2018-12-14 PROCEDURE — 3079F DIAST BP 80-89 MM HG: CPT | Mod: CPTII,S$GLB,, | Performed by: UROLOGY

## 2018-12-14 PROCEDURE — 99214 OFFICE O/P EST MOD 30 MIN: CPT | Mod: 25,S$GLB,, | Performed by: UROLOGY

## 2018-12-14 PROCEDURE — 99999 PR PBB SHADOW E&M-EST. PATIENT-LVL IV: CPT | Mod: PBBFAC,,, | Performed by: UROLOGY

## 2018-12-14 PROCEDURE — 1101F PT FALLS ASSESS-DOCD LE1/YR: CPT | Mod: CPTII,S$GLB,, | Performed by: UROLOGY

## 2018-12-14 PROCEDURE — 3077F SYST BP >= 140 MM HG: CPT | Mod: CPTII,S$GLB,, | Performed by: UROLOGY

## 2018-12-14 RX ORDER — LIDOCAINE HYDROCHLORIDE 20 MG/ML
JELLY TOPICAL ONCE
Status: CANCELLED | OUTPATIENT
Start: 2018-12-14 | End: 2018-12-14

## 2018-12-14 RX ORDER — CIPROFLOXACIN 500 MG/1
500 TABLET ORAL 2 TIMES DAILY
Qty: 6 TABLET | Refills: 0 | Status: SHIPPED | OUTPATIENT
Start: 2018-12-14 | End: 2018-12-17

## 2018-12-14 RX ORDER — LIDOCAINE HYDROCHLORIDE 10 MG/ML
10 INJECTION INFILTRATION; PERINEURAL ONCE
Status: CANCELLED | OUTPATIENT
Start: 2018-12-14 | End: 2018-12-14

## 2018-12-14 NOTE — PROGRESS NOTES
CC:  Elevated PSA, LUTS    Jarrett Charlton Jr. is a 68 y.o. man who is here for the evaluation of difficult urination.  Seen him for elevated PSA.  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  He was not interested in SUDS.  S/p colon surgery and his anus is closed.  Therefore dong TRUS bx of prostate is not feasible.  After discussion, we decided to try finasteride to see whether it will lower his PSA.   He has been on finasteride for the past couple of years.    When he was considered to undergo TURP, we could not do his surgery because he was not able to stop ASA or Plavix due to fresh vascular stent.  Now he got a clearance that he can hold off plavix and ASA.  He is here to discuss his LUTS and elevated PSA.    hx of straining to urinate but not bothered by it.  Even then his urine flow is not strong.  He tried flomax for the past 2 months without significant improvement.  Does feel that he is emptying his bladder completely.  Hx of ulcerative colitis, had colon surgery back in 1985 and his anus was closed.  Has a neuropathy on the right leg.  Hx of sciatic nerve on both sides and received injection on the back.  Hx of diabetes diagnosed 2 years ago.  No family hx of prostate cancer.  He is a .  Denies flank pain, dysuira, hematuria .      Past Medical History:   Diagnosis Date    Basal cell carcinoma     BPH (benign prostatic hyperplasia)     s/p TURP    Carotid stenosis     Chronic kidney disease     CKD (chronic kidney disease)     Claudication     DDD (degenerative disc disease) 10/21/2013    Diabetes mellitus     Diabetes mellitus with renal complications     Disc disease, degenerative, cervical     GERD (gastroesophageal reflux disease)     Gout, chronic     History of ulcerative colitis     s/p colectomy and ileostomy    HLD (hyperlipidemia)     HTN (hypertension)     Ileostomy in place  1982    PVC (premature ventricular contraction)     RBBB     Squamous cell carcinoma 03/08/2018    Left superior helix near insertion    Squamous cell carcinoma 04/12/2018    Left forearm x 5    Ventricular tachycardia      Past Surgical History:   Procedure Laterality Date    Senzhzxsh-Ogfvpnelcrt-Hx  11/10/2017    Performed [facility] at [facility] CATH LAB    cardiac stents      CATARACT EXTRACTION Bilateral     CERVICAL FUSION      colectomy and ileostomy      RAJENDRA-TRANSFORAMINAL N/A 6/4/2015    Performed by [facility] CATH LAB    RAJENDRA-TRANSFORAMINAL N/A 12/4/2013    Performed by [facility] CATH LAB    INJECTION, STEROID, SPINE, LUMBAR, EPIDURAL N/A 10/21/2013    Performed by [facility] CATH LAB    INJECTION-STEROID-EPIDURAL N/A 5/27/2014    Performed by [facility] CATH LAB    INJECTION-STEROID-EPIDURAL-LUMBAR N/A 11/30/2015    Performed by [facility] CATH LAB    INJECTION-STEROID-EPIDURAL-LUMBAR N/A 9/17/2014    Performed by [facility] at [facility] CATH LAB    Left heart cath Left 11/10/2017    Performed by K[facility]ado MD at [facility] CATH LAB    Percutaneous coronary intervention  11/10/2017    Performed by [facility] MD at [facility] CATH LAB    TRANSURETHRAL RESECTION OF PROSTATE       Social History     Tobacco Use    Smoking status: Never Smoker    Smokeless tobacco: Never Used   Substance Use Topics    Alcohol use: No    Drug use: No     Family History   Problem Relation Age of Onset    Melanoma Neg Hx     Hypertension Neg Hx     Diabetes Neg Hx     Arthritis Neg Hx      Allergy:  No Known Allergies  Outpatient Encounter Medications as of 12/14/2018   Medication Sig Dispense Refill    ACCU-CHEK SOFTCLIX LANCETS Misc       allopurinol (ZYLOPRIM) 300 MG tablet       aspirin (ECOTRIN) 81 MG EC tablet Take 81 mg by mouth once daily.        BD ALCOHOL  SWABS PadM       blood sugar diagnostic (ONE TOUCH ULTRA TEST) Strp USE ONE STRIP TO CHECK GLUCOSE EVERY  each 11    clopidogrel (PLAVIX) 75 mg tablet Take 75 mg by mouth once daily.      finasteride (PROSCAR) 5 mg tablet TAKE 1 TABLET ONE TIME DAILY 90 tablet 3    gabapentin (NEURONTIN) 300 MG capsule Take 2 capsules (600 mg total) by mouth 2 (two) times daily. 120 capsule 11    indomethacin (INDOCIN) 50 MG capsule Take 1 capsule (50 mg total) by mouth 2 (two) times daily with meals. For gout 30 capsule 0    lisinopril (PRINIVIL,ZESTRIL) 5 MG tablet TAKE ONE TABLET BY MOUTH ONCE DAILY 30 tablet 11    metFORMIN (GLUCOPHAGE) 500 MG tablet TAKE 1 TABLET TWICE DAILY 180 tablet 3    metoprolol succinate (TOPROL-XL) 25 MG 24 hr tablet Take 25 mg by mouth once daily.      omeprazole (PRILOSEC) 40 MG capsule TAKE 1 CAPSULE EVERY DAY 90 capsule 3    pravastatin (PRAVACHOL) 40 MG tablet TAKE 1 TABLET EVERY DAY 90 tablet 3    TOLAK 4 % Crea Apply 1 application topically once daily. To arms and hands for 3 weeks 80 g 3    [DISCONTINUED] cetirizine (ZYRTEC) 10 MG tablet Take 1 tablet (10 mg total) by mouth once daily. 30 tablet 0    ciprofloxacin HCl (CIPRO) 500 MG tablet Take 1 tablet (500 mg total) by mouth 2 (two) times daily. for 6 doses 6 tablet 0    sodium phosphates (FLEET ENEMA) 19-7 gram/118 mL Enem Place 1 enema rectally once. for 1 dose 1 enema 1    [DISCONTINUED] fluorouracil (EFUDEX) 5 % cream AAA bid x 2-3 weeks to right forearm and hand; avoid unhealed biopsy sites 40 g 1     No facility-administered encounter medications on file as of 12/14/2018.      Review of Systems   General ROS: GENERAL:  No weight gain or loss  SKIN:  No rashes or lacerations  HEAD:  No headaches  EYES:  No exophthalmos, jaundice or blindness  EARS:  No dizziness, tinnitus or hearing loss  NOSE:  No changes in smell  MOUTH & THROAT:  No dyskinetic movements or obvious goiter  CHEST:  No shortness of breath,  hyperventilation or cough  CARDIOVASCULAR:  No tachycardia or chest pain  ABDOMEN:  No nausea, vomiting, pain, constipation or diarrhea  URINARY:  No frequency, dysuria or sexual dysfunction  ENDOCRINE:  No polydipsia, polyuria  MUSCULOSKELETAL:  No pain or stiffness of the joints  NEUROLOGIC:  No weakness, sensory changes, seizures, confusion, memory loss, tremor or other abnormal movements  Physical Exam     Vitals:    12/14/18 0752   BP: (!) 147/88   Pulse: 72     General Appearance:  Alert, cooperative, no distress, appears stated age   Head:  Normocephalic, without obvious abnormality, atraumatic   Eyes:  PERRL, conjunctiva/corneas clear, EOM's intact, fundi benign, both eyes   Ears:  Normal TM's and external ear canals, both ears   Nose: Nares normal, septum midline, mucosa normal, no drainage or sinus tenderness   Throat: Lips, mucosa, and tongue normal; teeth and gums normal   Neck: Supple, symmetrical, trachea midline, no adenopathy, thyroid: not enlarged, symmetric, no tenderness/mass/nodules, no carotid bruit or JVD   Back:   Symmetric, no curvature, ROM normal, no CVA tenderness   Lungs:   Clear to auscultation bilaterally, respirations unlabored   Chest Wall:  No tenderness or deformity   Heart:  Regular rate and rhythm, S1, S2 normal, no murmur, rub or gallop   Abdomen:   Soft, non-tender, bowel sounds active all four quadrants,  no masses, no organomegaly  Ileostomy in tact.   Genitalia:  Normal male, normal testicles, normal epididymis, normal vas palpated.       Extremities: Extremities normal, atraumatic, no cyanosis or edema   Pulses: 2+ and symmetric   Skin: Skin color, texture, turgor normal, no rashes or lesions   Lymph nodes: Cervical, supraclavicular, and axillary nodes normal   Neurologic: Normal     Prostate unable to check.     PVR 0 ml per bladder scan.    LABS:  Lab Results   Component Value Date    PSA 2.1 09/15/2014    PSA 2.16 05/13/2013    PSA 1.2 03/19/2012    PSADIAG 4.1 (H)  11/21/2018    PSADIAG 3.3 06/15/2018    PSADIAG 3.1 05/08/2018     Lab Results   Component Value Date    CREATININE 1.9 (H) 12/06/2018    CREATININE 1.2 09/14/2018    CREATININE 1.4 08/26/2018     MRI of prostate 12/9/18  Redemonstration of a focal lesion along the posterior margin of the TURP resection cavity concerning for prostatic cancer.  Lesion is grossly unchanged from examination dated 06/19/2018.    Overall Assessment:    PIRADS 4 (clinically significant cancer is likely to be present)    Number of targets created for potential MR/US fusion biopsy    Residual prostatic tissue: 1  Assessment and Plan:  Jarrett was seen today for elevated psa.    Diagnoses and all orders for this visit:    Elevated PSA  -     Transrectal Ultrasound w/ Biopsy; Future  -     Tissue Specimen To Pathology, Urology; Standing  -     ciprofloxacin HCl (CIPRO) 500 MG tablet; Take 1 tablet (500 mg total) by mouth 2 (two) times daily. for 6 doses  -     sodium phosphates (FLEET ENEMA) 19-7 gram/118 mL Enem; Place 1 enema rectally once. for 1 dose    S/P TURP    Other orders  -     lidocaine HCL 2% jelly  -     lidocaine HCL 10 mg/ml (1%) injection 10 mL    he has been on Finasteride, and his PSA is still rising.  MRI of prostate again showed a lesion in the prostate in the transitional area.  Will proceed with bipolar TURP.  Stop Plavix 5 days and ASA 1 wk before surgery.  All questions answered.  A brochure given.  I spent 25 minutes with the patient of which more than half was spent in direct consultation with the patient in regards to our treatment and plan.      Follow-up:  Follow-up for TURP.

## 2018-12-14 NOTE — TELEPHONE ENCOUNTER
Elevated PSA  -     Case Request Operating Room: TURP, WITHOUT USING LASER BIPOLAR    Bladder outflow obstruction  -     Case Request Operating Room: TURP, WITHOUT USING LASER BIPOLAR

## 2019-01-06 PROBLEM — K56.609 SBO (SMALL BOWEL OBSTRUCTION): Status: ACTIVE | Noted: 2019-01-06

## 2019-01-07 ENCOUNTER — HOSPITAL ENCOUNTER (OUTPATIENT)
Facility: HOSPITAL | Age: 69
Discharge: HOME | DRG: 389 | End: 2019-01-08
Attending: SURGERY | Admitting: SURGERY
Payer: MEDICARE

## 2019-01-07 DIAGNOSIS — R97.20 ELEVATED PSA: ICD-10-CM

## 2019-01-07 DIAGNOSIS — K56.609 SBO (SMALL BOWEL OBSTRUCTION): Primary | ICD-10-CM

## 2019-01-07 LAB
ANION GAP SERPL CALC-SCNC: 12 MMOL/L
APTT BLDCRRT: 23.9 SEC
BASOPHILS # BLD AUTO: 0.02 K/UL
BASOPHILS NFR BLD: 0.2 %
BUN SERPL-MCNC: 49 MG/DL
CALCIUM SERPL-MCNC: 9.9 MG/DL
CHLORIDE SERPL-SCNC: 107 MMOL/L
CO2 SERPL-SCNC: 19 MMOL/L
CREAT SERPL-MCNC: 2.4 MG/DL
DIFFERENTIAL METHOD: ABNORMAL
EOSINOPHIL # BLD AUTO: 0 K/UL
EOSINOPHIL NFR BLD: 0.5 %
ERYTHROCYTE [DISTWIDTH] IN BLOOD BY AUTOMATED COUNT: 14.5 %
EST. GFR  (AFRICAN AMERICAN): 30.7 ML/MIN/1.73 M^2
EST. GFR  (NON AFRICAN AMERICAN): 26.5 ML/MIN/1.73 M^2
ESTIMATED AVG GLUCOSE: 143 MG/DL
GLUCOSE SERPL-MCNC: 124 MG/DL
HBA1C MFR BLD HPLC: 6.6 %
HCT VFR BLD AUTO: 46.8 %
HGB BLD-MCNC: 15 G/DL
IMM GRANULOCYTES # BLD AUTO: 0.02 K/UL
IMM GRANULOCYTES NFR BLD AUTO: 0.2 %
INR PPP: 1
LYMPHOCYTES # BLD AUTO: 0.9 K/UL
LYMPHOCYTES NFR BLD: 10.4 %
MAGNESIUM SERPL-MCNC: 1.7 MG/DL
MCH RBC QN AUTO: 30.2 PG
MCHC RBC AUTO-ENTMCNC: 32.1 G/DL
MCV RBC AUTO: 94 FL
MONOCYTES # BLD AUTO: 0.5 K/UL
MONOCYTES NFR BLD: 5.2 %
NEUTROPHILS # BLD AUTO: 7.3 K/UL
NEUTROPHILS NFR BLD: 83.5 %
NRBC BLD-RTO: 0 /100 WBC
PHOSPHATE SERPL-MCNC: 4.3 MG/DL
PLATELET # BLD AUTO: 149 K/UL
PMV BLD AUTO: 12.2 FL
POCT GLUCOSE: 104 MG/DL (ref 70–110)
POCT GLUCOSE: 137 MG/DL (ref 70–110)
POCT GLUCOSE: 91 MG/DL (ref 70–110)
POTASSIUM SERPL-SCNC: 4.8 MMOL/L
PROTHROMBIN TIME: 10.9 SEC
RBC # BLD AUTO: 4.97 M/UL
SODIUM SERPL-SCNC: 138 MMOL/L
WBC # BLD AUTO: 8.68 K/UL

## 2019-01-07 PROCEDURE — 25000003 PHARM REV CODE 250: Performed by: STUDENT IN AN ORGANIZED HEALTH CARE EDUCATION/TRAINING PROGRAM

## 2019-01-07 PROCEDURE — 83735 ASSAY OF MAGNESIUM: CPT

## 2019-01-07 PROCEDURE — 20600001 HC STEP DOWN PRIVATE ROOM

## 2019-01-07 PROCEDURE — G0379 DIRECT REFER HOSPITAL OBSERV: HCPCS

## 2019-01-07 PROCEDURE — 85730 THROMBOPLASTIN TIME PARTIAL: CPT

## 2019-01-07 PROCEDURE — 84100 ASSAY OF PHOSPHORUS: CPT

## 2019-01-07 PROCEDURE — 85025 COMPLETE CBC W/AUTO DIFF WBC: CPT

## 2019-01-07 PROCEDURE — 99222 PR INITIAL HOSPITAL CARE,LEVL II: ICD-10-PCS | Mod: AI,,, | Performed by: SURGERY

## 2019-01-07 PROCEDURE — G0378 HOSPITAL OBSERVATION PER HR: HCPCS

## 2019-01-07 PROCEDURE — 80048 BASIC METABOLIC PNL TOTAL CA: CPT

## 2019-01-07 PROCEDURE — 36415 COLL VENOUS BLD VENIPUNCTURE: CPT

## 2019-01-07 PROCEDURE — C9113 INJ PANTOPRAZOLE SODIUM, VIA: HCPCS | Performed by: STUDENT IN AN ORGANIZED HEALTH CARE EDUCATION/TRAINING PROGRAM

## 2019-01-07 PROCEDURE — 63600175 PHARM REV CODE 636 W HCPCS: Performed by: STUDENT IN AN ORGANIZED HEALTH CARE EDUCATION/TRAINING PROGRAM

## 2019-01-07 PROCEDURE — 85610 PROTHROMBIN TIME: CPT

## 2019-01-07 PROCEDURE — 83036 HEMOGLOBIN GLYCOSYLATED A1C: CPT

## 2019-01-07 PROCEDURE — 99222 1ST HOSP IP/OBS MODERATE 55: CPT | Mod: AI,,, | Performed by: SURGERY

## 2019-01-07 RX ORDER — METOPROLOL SUCCINATE 25 MG/1
25 TABLET, EXTENDED RELEASE ORAL DAILY
Status: DISCONTINUED | OUTPATIENT
Start: 2019-01-07 | End: 2019-01-08 | Stop reason: HOSPADM

## 2019-01-07 RX ORDER — FINASTERIDE 5 MG/1
5 TABLET, FILM COATED ORAL DAILY
Status: DISCONTINUED | OUTPATIENT
Start: 2019-01-07 | End: 2019-01-08 | Stop reason: HOSPADM

## 2019-01-07 RX ORDER — SODIUM CHLORIDE, SODIUM LACTATE, POTASSIUM CHLORIDE, CALCIUM CHLORIDE 600; 310; 30; 20 MG/100ML; MG/100ML; MG/100ML; MG/100ML
INJECTION, SOLUTION INTRAVENOUS CONTINUOUS
Status: DISCONTINUED | OUTPATIENT
Start: 2019-01-07 | End: 2019-01-07

## 2019-01-07 RX ORDER — ONDANSETRON 2 MG/ML
4 INJECTION INTRAMUSCULAR; INTRAVENOUS EVERY 6 HOURS PRN
Status: DISCONTINUED | OUTPATIENT
Start: 2019-01-07 | End: 2019-01-08 | Stop reason: HOSPADM

## 2019-01-07 RX ORDER — PANTOPRAZOLE SODIUM 40 MG/10ML
40 INJECTION, POWDER, LYOPHILIZED, FOR SOLUTION INTRAVENOUS DAILY
Status: DISCONTINUED | OUTPATIENT
Start: 2019-01-07 | End: 2019-01-07

## 2019-01-07 RX ORDER — PANTOPRAZOLE SODIUM 40 MG/1
40 TABLET, DELAYED RELEASE ORAL DAILY
Status: DISCONTINUED | OUTPATIENT
Start: 2019-01-08 | End: 2019-01-08 | Stop reason: HOSPADM

## 2019-01-07 RX ORDER — HYDROMORPHONE HYDROCHLORIDE 1 MG/ML
0.5 INJECTION, SOLUTION INTRAMUSCULAR; INTRAVENOUS; SUBCUTANEOUS
Status: DISCONTINUED | OUTPATIENT
Start: 2019-01-07 | End: 2019-01-08 | Stop reason: HOSPADM

## 2019-01-07 RX ORDER — SODIUM CHLORIDE 0.9 % (FLUSH) 0.9 %
3 SYRINGE (ML) INJECTION
Status: DISCONTINUED | OUTPATIENT
Start: 2019-01-07 | End: 2019-01-08 | Stop reason: HOSPADM

## 2019-01-07 RX ORDER — LANOLIN ALCOHOL/MO/W.PET/CERES
400 CREAM (GRAM) TOPICAL ONCE
Status: COMPLETED | OUTPATIENT
Start: 2019-01-07 | End: 2019-01-07

## 2019-01-07 RX ORDER — LIDOCAINE HYDROCHLORIDE 10 MG/ML
10 INJECTION INFILTRATION; PERINEURAL ONCE
Status: DISCONTINUED | OUTPATIENT
Start: 2019-01-07 | End: 2019-02-28

## 2019-01-07 RX ORDER — METOPROLOL TARTRATE 1 MG/ML
5 INJECTION, SOLUTION INTRAVENOUS EVERY 6 HOURS
Status: DISCONTINUED | OUTPATIENT
Start: 2019-01-07 | End: 2019-01-07

## 2019-01-07 RX ORDER — LIDOCAINE HYDROCHLORIDE 20 MG/ML
JELLY TOPICAL ONCE
Status: DISCONTINUED | OUTPATIENT
Start: 2019-01-07 | End: 2019-02-28

## 2019-01-07 RX ORDER — PRAVASTATIN SODIUM 40 MG/1
40 TABLET ORAL DAILY
Status: DISCONTINUED | OUTPATIENT
Start: 2019-01-07 | End: 2019-01-08 | Stop reason: HOSPADM

## 2019-01-07 RX ADMIN — METOPROLOL SUCCINATE 25 MG: 25 TABLET, EXTENDED RELEASE ORAL at 01:01

## 2019-01-07 RX ADMIN — PANTOPRAZOLE SODIUM 40 MG: 40 INJECTION, POWDER, FOR SOLUTION INTRAVENOUS at 09:01

## 2019-01-07 RX ADMIN — MAGNESIUM OXIDE TAB 400 MG (241.3 MG ELEMENTAL MG) 400 MG: 400 (241.3 MG) TAB at 01:01

## 2019-01-07 RX ADMIN — METOPROLOL TARTRATE 5 MG: 5 INJECTION, SOLUTION INTRAVENOUS at 05:01

## 2019-01-07 RX ADMIN — SODIUM CHLORIDE, SODIUM LACTATE, POTASSIUM CHLORIDE, AND CALCIUM CHLORIDE 1000 ML: .6; .31; .03; .02 INJECTION, SOLUTION INTRAVENOUS at 06:01

## 2019-01-07 RX ADMIN — FINASTERIDE 5 MG: 5 TABLET, FILM COATED ORAL at 01:01

## 2019-01-07 RX ADMIN — HYDROMORPHONE HYDROCHLORIDE 0.5 MG: 1 INJECTION, SOLUTION INTRAMUSCULAR; INTRAVENOUS; SUBCUTANEOUS at 09:01

## 2019-01-07 RX ADMIN — HYDROMORPHONE HYDROCHLORIDE 0.5 MG: 1 INJECTION, SOLUTION INTRAMUSCULAR; INTRAVENOUS; SUBCUTANEOUS at 05:01

## 2019-01-07 RX ADMIN — PRAVASTATIN SODIUM 40 MG: 40 TABLET ORAL at 01:01

## 2019-01-07 RX ADMIN — SODIUM CHLORIDE, SODIUM LACTATE, POTASSIUM CHLORIDE, AND CALCIUM CHLORIDE: .6; .31; .03; .02 INJECTION, SOLUTION INTRAVENOUS at 04:01

## 2019-01-07 NOTE — PLAN OF CARE
Problem: Adult Inpatient Plan of Care  Goal: Plan of Care Review  Outcome: Ongoing (interventions implemented as appropriate)  Pt AAOx4. Pt tolerating regular diet with no complaints of discomfort or nausea. Pt denied pain. VSS on RA. Ileostomy bag intact with no leakage, independent with care. Call light in reach and bed in lowest position. Pt slept between care. Pt remains free of falls and injury. No acute events this shift. Will continue to monitor.

## 2019-01-07 NOTE — H&P
"[facility]  General Surgery  History & Physical    Patient Name: Jarrett Charlton Jr.  MRN: [id number]  Admission Date: 1/7/2019  Attending Physician: Rivas Urena MD   Primary Care Provider: Poli Gonzalez MD    Patient information was obtained from patient and ER records.     Subjective:     Chief Complaint: Muscle spasms    Reason for Admission: Concern for small bowel obstruction secondary to parastomal hernia    History of Present Illness:  Patient is a 68 y.o. male presents as a transfer from an OSH in [place], [place]. He has a past medical history of long-standing ulcerative colitis with end ileostomy, s/p revisions and transposition to right side for history of parastomal and incisional hernias. He reports that in the last week he was having increased muscle cramping, both in frequency and intensity, with watery stool out of his ileostomy. He was self-treating himself by hydrating with water and gatorade. However, he reports that on early Saturday morning 1/5/18 he was working in his shed when he had unbearable muscle spasms and sudden onset nausea and abdominal pain. He vomited and feinted, which prompted his admission to the ED of the OSH. His workup included CT scans of both the chest and the abdomen. Abdominal CT scan showed a loop of small bowel protruding through a fascial defect at the level of his ileostomy. He was transferred to [facility] for potential surgical management of the hernia with concern for SBO.    Upon admission to [facility], his vital signs remained stable with mildly low blood pressure. The patient reported that his BP often runs low, causing him to feel light headed. He denied any further nausea or vomiting since his episode early Saturday morning. He denied abdominal pain. He denied fevers or chills. His ostomy bag was full with thin liquid stool and gas. He reported that he usually has formed stool, but described his recent ostomy output as "pea soup."    His other past " medical history includes peripheral neuropathy of unknown etiology, type 2 diabetes, degenerative disc disease, CKD stage II, and GERD.     Current Facility-Administered Medications on File Prior to Encounter   Medication    [COMPLETED] fentaNYL injection 50 mcg    [COMPLETED] lorazepam injection 1 mg    [COMPLETED] meperidine injection 25 mg    [COMPLETED] ondansetron injection 4 mg    [COMPLETED] sodium chloride 0.9% bolus 1,000 mL    [DISCONTINUED] diazePAM injection 5 mg     Current Outpatient Medications on File Prior to Encounter   Medication Sig    ACCU-CHEK SOFTCLIX LANCETS Misc     allopurinol (ZYLOPRIM) 300 MG tablet     aspirin (ECOTRIN) 81 MG EC tablet Take 81 mg by mouth once daily.      BD ALCOHOL SWABS PadM     blood sugar diagnostic (ONE TOUCH ULTRA TEST) Strp USE ONE STRIP TO CHECK GLUCOSE EVERY DAY    clopidogrel (PLAVIX) 75 mg tablet Take 75 mg by mouth once daily.    finasteride (PROSCAR) 5 mg tablet TAKE 1 TABLET ONE TIME DAILY    gabapentin (NEURONTIN) 300 MG capsule Take 2 capsules (600 mg total) by mouth 2 (two) times daily.    indomethacin (INDOCIN) 50 MG capsule Take 1 capsule (50 mg total) by mouth 2 (two) times daily with meals. For gout    lisinopril (PRINIVIL,ZESTRIL) 5 MG tablet TAKE ONE TABLET BY MOUTH ONCE DAILY    metFORMIN (GLUCOPHAGE) 500 MG tablet TAKE 1 TABLET TWICE DAILY    metoprolol succinate (TOPROL-XL) 25 MG 24 hr tablet Take 25 mg by mouth once daily.    omeprazole (PRILOSEC) 40 MG capsule TAKE 1 CAPSULE EVERY DAY    pravastatin (PRAVACHOL) 40 MG tablet TAKE 1 TABLET EVERY DAY    TOLAK 4 % Crea Apply 1 application topically once daily. To arms and hands for 3 weeks       Review of patient's allergies indicates:  No Known Allergies    Past Medical History:   Diagnosis Date    Basal cell carcinoma     BPH (benign prostatic hyperplasia)     s/p TURP    Carotid stenosis     Chronic kidney disease     CKD (chronic kidney disease)     Claudication      DDD (degenerative disc disease) 10/21/2013    Diabetes mellitus     Diabetes mellitus with renal complications     Disc disease, degenerative, cervical     GERD (gastroesophageal reflux disease)     Gout, chronic     History of ulcerative colitis     s/p colectomy and ileostomy    HLD (hyperlipidemia)     HTN (hypertension)     Ileostomy in place 1982    PVC (premature ventricular contraction)     RBBB     Squamous cell carcinoma 03/08/2018    Left superior helix near insertion    Squamous cell carcinoma 04/12/2018    Left forearm x 5    Ventricular tachycardia      Past Surgical History:   Procedure Laterality Date    Wazrhotij-Gdxqtxkszns-Oy  11/10/2017    Performed by Kiko Al[facility]ado MD at [facility] CATH LAB    cardiac stents      CATARACT EXTRACTION Bilateral     CERVICAL FUSION      colectomy and ileostomy      RAJENDRA-TRANSFORAMINAL N/A 6/4/2015    Performed by Dos Diagnostic Provider at [facility] CATH LAB    RAJENDRA-TRANSFORAMINAL N/A 12/4/2013    Performed by Dos Diagnostic Provider at [facility] CATH LAB    INJECTION, STEROID, SPINE, LUMBAR, EPIDURAL N/A 10/21/2013    Performed by Dosc Diagnostic Provider at [facility] CATH LAB    INJECTION-STEROID-EPIDURAL N/A 5/27/2014    Performed by Dosc Diagnostic Provider at [facility] CATH LAB    INJECTION-STEROID-EPIDURAL-LUMBAR N/A 11/30/2015    Performed by Dosc Diagnostic Provider at [facility] CATH LAB    INJECTION-STEROID-EPIDURAL-LUMBAR N/A 9/17/2014    Performed by Dos Diagnostic Provider at [facility] CATH LAB    Left heart cath Left 11/10/2017    Performed by Kiko [facility]ado MD at [facility] CATH LAB    Percutaneous coronary intervention  11/10/2017    Performed by Kiko Alvarado MD at [facility] CATH LAB    TRANSURETHRAL RESECTION OF PROSTATE       Family History     None        Tobacco Use    Smoking status: Never Smoker    Smokeless tobacco: Never Used   Substance and Sexual Activity    Alcohol use: No    Drug use: No    Sexual activity: Not on file     Review of Systems    Constitutional: Positive for activity change, appetite change and fatigue. Negative for chills, diaphoresis and fever.   HENT: Negative.    Respiratory: Negative for chest tightness and shortness of breath.    Cardiovascular: Negative for chest pain, palpitations and leg swelling.   Gastrointestinal: Positive for diarrhea. Negative for abdominal pain, blood in stool, constipation, nausea and vomiting.   Endocrine: Negative for polydipsia and polyuria.   Genitourinary: Negative for difficulty urinating and frequency.   Musculoskeletal: Positive for arthralgias, back pain and myalgias.   Skin: Negative for rash.   Neurological: Positive for light-headedness.     Objective:     Vital Signs (Most Recent):  Temp: 97.9 °F (36.6 °C) (01/07/19 1142)  Pulse: 81 (01/07/19 1142)  Resp: 20 (01/07/19 1142)  BP: 116/77 (01/07/19 1142)  SpO2: 98 % (01/07/19 1142) Vital Signs (24h Range):  Temp:  [97.7 °F (36.5 °C)-98.1 °F (36.7 °C)] 97.9 °F (36.6 °C)  Pulse:  [] 81  Resp:  [16-27] 20  SpO2:  [94 %-98 %] 98 %  BP: ()/(59-77) 116/77     Weight: 92.1 kg (203 lb)  Body mass index is 26.06 kg/m².    Physical Exam   Constitutional: He is oriented to person, place, and time. He appears well-developed and well-nourished. He appears distressed.   Neck: No JVD present. No tracheal deviation present.   Cardiovascular: Normal rate and regular rhythm.   Pulmonary/Chest: Effort normal. No respiratory distress.   Abdominal: Soft. Bowel sounds are normal. He exhibits no distension. There is no tenderness. There is no guarding.       Musculoskeletal: He exhibits no edema.   Neurological: He is alert and oriented to person, place, and time.   Skin: Skin is warm and dry.   Multiple age-related purpuric lesions on upper extremities   Nursing note and vitals reviewed.      Significant Labs:  CBC:   Recent Labs   Lab 01/07/19  0430   WBC 8.68   RBC 4.97   HGB 15.0   HCT 46.8   *   MCV 94   MCH 30.2   MCHC 32.1     BMP:   Recent  Labs   Lab 01/07/19  0430   *      K 4.8      CO2 19*   BUN 49*   CREATININE 2.4*   CALCIUM 9.9   MG 1.7       Significant Diagnostics:  CT: I have reviewed all pertinent results/findings within the past 24 hours and my personal findings are:  parastomal hernia, non obstructed or inflammed. Distended small bowel in the left mid abdomen up to 3 cm is suspicious for low grade partial small bowel obstruction which could represent ileus/enteritis.    Assessment/Plan:     Active Diagnoses:    Diagnosis Date Noted POA    PRINCIPAL PROBLEM:  SBO (small bowel obstruction) [K56.609] 01/06/2019 Yes      Problems Resolved During this Admission:     VTE Risk Mitigation (From admission, onward)        Ordered     Place sequential compression device  Until discontinued      01/07/19 0414     IP VTE HIGH RISK PATIENT  Once      01/07/19 0414        Patient is a 69yo male with radiographic evidence of parastomal hernia that does not appear to be incarcerated or obstructed. With the lack of nausea, vomiting, abdominal pain, and that he is still passing copious amounts of stool, clinical suspicion is low for small bowel obstruction. More likely gastroenteritis.     - Will fluid resusitate. S/p LR bolus and now continuing maintenance IVF.  - Replete electrolytes as needed - all within normal limits this morning  - Daily CBC, BMP, Mg, Ph  - NPO  - IV pain control  - OOB/ambulate as able        Rosaline Mckay MD  General Surgery  [facility]

## 2019-01-07 NOTE — PLAN OF CARE
Problem: Adult Inpatient Plan of Care  Goal: Plan of Care Review  Outcome: Ongoing (interventions implemented as appropriate)  Pt alert and oriented medicated for pain management and has been given an LR bolus of 1000 mL and once completed he'll receive his maintenance dose. His ileostomy is putting out liquid stool. He's been educated about his treatment plan and care to be provided

## 2019-01-07 NOTE — PLAN OF CARE
Patient lives in a mobile home w/ramp/railings, & 5 [address]/railings, w/his spouse. He is independent & agile. No needs determined.     [facility] given to patient after informed about it;patient verbalized their understanding.          01/07/19 1130   Discharge Assessment   Assessment Type Discharge Planning Assessment   Confirmed/corrected address and phone number on facesheet? Yes   Assessment information obtained from? Patient;Medical Record   Expected Length of Stay (days) (TBD)   Communicated expected length of stay with patient/caregiver no  (Per MD)   Prior to hospitilization cognitive status: Alert/Oriented;No Deficits   Prior to hospitalization functional status: Independent;Assistive Equipment   Current cognitive status: Alert/Oriented;No Deficits   Current Functional Status: Independent;Assistive Equipment   Facility Arrived From: (N/A)   Lives With spouse   Able to Return to Prior Arrangements yes   Is patient able to care for self after discharge? Yes   Who are your caregiver(s) and their phone number(s)? (Dorie Charlton Spouse [phone]   )   Patient's perception of discharge disposition home or selfcare   Readmission Within the Last 30 Days no previous admission in last 30 days   Patient currently being followed by outpatient case management? No   Patient currently receives any other outside agency services? No   Equipment Currently Used at Home colostomy/ostomy supplies   Do you have any problems affording any of your prescribed medications? No   Is the patient taking medications as prescribed? yes   Does the patient have transportation home? Yes   Transportation Anticipated family or friend will provide   Dialysis Name and Scheduled days (N/A)   Does the patient receive services at the Coumadin Clinic? No  (On Coumadin, goes to his Cardiologist-Dr. Dao q 3 months. He does not have frequent lab work. )   Discharge Plan A Home with family   Discharge Plan B Home with  family   Patient/Family in Agreement with Plan yes

## 2019-01-08 VITALS
WEIGHT: 202.81 LBS | TEMPERATURE: 98 F | HEART RATE: 81 BPM | SYSTOLIC BLOOD PRESSURE: 109 MMHG | OXYGEN SATURATION: 95 % | HEIGHT: 74 IN | DIASTOLIC BLOOD PRESSURE: 73 MMHG | RESPIRATION RATE: 16 BRPM | BODY MASS INDEX: 26.03 KG/M2

## 2019-01-08 LAB
ANION GAP SERPL CALC-SCNC: 8 MMOL/L
BASOPHILS # BLD AUTO: 0.03 K/UL
BASOPHILS NFR BLD: 0.4 %
BUN SERPL-MCNC: 34 MG/DL
CALCIUM SERPL-MCNC: 8.9 MG/DL
CHLORIDE SERPL-SCNC: 108 MMOL/L
CO2 SERPL-SCNC: 21 MMOL/L
CREAT SERPL-MCNC: 1.6 MG/DL
DIFFERENTIAL METHOD: ABNORMAL
EOSINOPHIL # BLD AUTO: 0.2 K/UL
EOSINOPHIL NFR BLD: 2.1 %
ERYTHROCYTE [DISTWIDTH] IN BLOOD BY AUTOMATED COUNT: 14.3 %
EST. GFR  (AFRICAN AMERICAN): 50.1 ML/MIN/1.73 M^2
EST. GFR  (NON AFRICAN AMERICAN): 43.3 ML/MIN/1.73 M^2
GLUCOSE SERPL-MCNC: 124 MG/DL
HCT VFR BLD AUTO: 41.6 %
HGB BLD-MCNC: 13.7 G/DL
IMM GRANULOCYTES # BLD AUTO: 0.02 K/UL
IMM GRANULOCYTES NFR BLD AUTO: 0.3 %
LYMPHOCYTES # BLD AUTO: 1.5 K/UL
LYMPHOCYTES NFR BLD: 21.1 %
MAGNESIUM SERPL-MCNC: 1.5 MG/DL
MCH RBC QN AUTO: 30.6 PG
MCHC RBC AUTO-ENTMCNC: 32.9 G/DL
MCV RBC AUTO: 93 FL
MONOCYTES # BLD AUTO: 0.6 K/UL
MONOCYTES NFR BLD: 8.8 %
NEUTROPHILS # BLD AUTO: 4.7 K/UL
NEUTROPHILS NFR BLD: 67.3 %
NRBC BLD-RTO: 0 /100 WBC
PHOSPHATE SERPL-MCNC: 2.5 MG/DL
PLATELET # BLD AUTO: 134 K/UL
PMV BLD AUTO: 12 FL
POTASSIUM SERPL-SCNC: 4.3 MMOL/L
RBC # BLD AUTO: 4.48 M/UL
SODIUM SERPL-SCNC: 137 MMOL/L
WBC # BLD AUTO: 7.02 K/UL

## 2019-01-08 PROCEDURE — G0378 HOSPITAL OBSERVATION PER HR: HCPCS

## 2019-01-08 PROCEDURE — 25000003 PHARM REV CODE 250: Performed by: STUDENT IN AN ORGANIZED HEALTH CARE EDUCATION/TRAINING PROGRAM

## 2019-01-08 PROCEDURE — 83735 ASSAY OF MAGNESIUM: CPT

## 2019-01-08 PROCEDURE — 36415 COLL VENOUS BLD VENIPUNCTURE: CPT

## 2019-01-08 PROCEDURE — 84100 ASSAY OF PHOSPHORUS: CPT

## 2019-01-08 PROCEDURE — 80048 BASIC METABOLIC PNL TOTAL CA: CPT

## 2019-01-08 PROCEDURE — 85025 COMPLETE CBC W/AUTO DIFF WBC: CPT

## 2019-01-08 RX ADMIN — PRAVASTATIN SODIUM 40 MG: 40 TABLET ORAL at 08:01

## 2019-01-08 RX ADMIN — PANTOPRAZOLE SODIUM 40 MG: 40 TABLET, DELAYED RELEASE ORAL at 08:01

## 2019-01-08 RX ADMIN — FINASTERIDE 5 MG: 5 TABLET, FILM COATED ORAL at 08:01

## 2019-01-08 RX ADMIN — METOPROLOL SUCCINATE 25 MG: 25 TABLET, EXTENDED RELEASE ORAL at 08:01

## 2019-01-08 NOTE — NURSING
D/c instructions reviewed with pt and daughter, all questions answered. IV d/c'd, catheter tip intact. Pt ambulated off unit with all pt belongings. Pt's daughter will provide transport to home.

## 2019-01-08 NOTE — PLAN OF CARE
Problem: Adult Inpatient Plan of Care  Goal: Plan of Care Review  Outcome: Ongoing (interventions implemented as appropriate)  Pt alert and oriented 0 distress resp even and unlabored medicated for pain management with effective results. Pt c/o the IV irritating his skin during the night and was told that it couldn't be removed without an order in the event it became useful again. However he's received discharge orders this morning and it was removed. The ileostomy continues to put out liquid stool he's been ambulating and the blood glucose levels have been WNL

## 2019-01-08 NOTE — PLAN OF CARE
01/08/19 0901   Final Note   Assessment Type Final Discharge Note   Anticipated Discharge Disposition Home   What phone number can be called within the next 1-3 days to see how you are doing after discharge? ([phone])   Hospital Follow Up  Appt(s) scheduled? Yes   Discharge plans and expectations educations in teach back method with documentation complete? Yes   Right Care Referral Info   Post Acute Recommendation No Care

## 2019-01-08 NOTE — DISCHARGE SUMMARY
"[facility]  General Surgery  Discharge Summary      Patient Name: Jarrett Charlton Jr.  MRN: [id number]  Admission Date: 1/7/2019  Hospital Length of Stay: 1 days  Discharge Date and Time:  01/08/2019 5:59 AM  Attending Physician: Rivas Urena MD   Discharging Provider: Jonathan Lee MD  Primary Care Provider: Poli Gonzalez MD     HPI:  Reason for Admission: Concern for small bowel obstruction secondary to parastomal hernia     History of Present Illness:  Patient is a 68 y.o. male presents as a transfer from an OSH in [place], [place]. He has a past medical history of long-standing ulcerative colitis with end ileostomy, s/p revisions and transposition to right side for history of parastomal and incisional hernias. He reports that in the last week he was having increased muscle cramping, both in frequency and intensity, with watery stool out of his ileostomy. He was self-treating himself by hydrating with water and gatorade. However, he reports that on early Saturday morning 1/5/18 he was working in his shed when he had unbearable muscle spasms and sudden onset nausea and abdominal pain. He vomited and feinted, which prompted his admission to the ED of the OSH. His workup included CT scans of both the chest and the abdomen. Abdominal CT scan showed a loop of small bowel protruding through a fascial defect at the level of his ileostomy. He was transferred to [facility] for potential surgical management of the hernia with concern for SBO.     Upon admission to [facility], his vital signs remained stable with mildly low blood pressure. The patient reported that his BP often runs low, causing him to feel light headed. He denied any further nausea or vomiting since his episode early Saturday morning. He denied abdominal pain. He denied fevers or chills. His ostomy bag was full with thin liquid stool and gas. He reported that he usually has formed stool, but described his recent ostomy output as "pea " "soup."     His other past medical history includes peripheral neuropathy of unknown etiology, type 2 diabetes, degenerative disc disease, CKD stage II, and GERD.         * No surgery found *     Hospital Course: the patient was admitted and treated for a partial SBO conservatively. By the afternoon of HD1 he was tolerating a diet and after hydration had resolution of his admission symptoms. On HD 2 he was meeting discharge criteria and desired discharge home. He will need follow up with his PCP and an ambulatory consult to GI      PEx:  A&O, NAD  RRR  NO Resp Distress  Bag in place with ostomy output  ABD: s/nt/nd    Consults:     Significant Diagnostic Studies: Labs:   BMP:   Recent Labs   Lab 01/06/19 1752 01/06/19 2034 01/07/19 0430   *  --  124*     --  138   K 3.8  --  4.8     --  107   CO2 20*  --  19*   BUN 44*  --  49*   CREATININE 2.6*  --  2.4*   CALCIUM 9.7  --  9.9   MG  --  1.6 1.7    and CMP   Recent Labs   Lab 01/06/19 1752 01/07/19 0430    138   K 3.8 4.8    107   CO2 20* 19*   * 124*   BUN 44* 49*   CREATININE 2.6* 2.4*   CALCIUM 9.7 9.9   PROT 8.2  --    ALBUMIN 4.4  --    BILITOT 1.3*  --    ALKPHOS 79  --    AST 49*  --    ALT 36  --    ANIONGAP 14 12   ESTGFRAFRICA 27.8* 30.7*   EGFRNONAA 24.1* 26.5*       Pending Diagnostic Studies:     Procedure Component Value Units Date/Time    Basic metabolic panel [[id number]] Collected:  01/08/19 0530    Order Status:  Sent Lab Status:  In process Updated:  01/08/19 0530    Specimen:  Blood     CBC auto differential [[id number]] Collected:  01/08/19 0530    Order Status:  Sent Lab Status:  In process Updated:  01/08/19 0530    Specimen:  Blood     Magnesium [[id number]] Collected:  01/08/19 0530    Order Status:  Sent Lab Status:  In process Updated:  01/08/19 0530    Specimen:  Blood     Phosphorus [[id number]] Collected:  01/08/19 0530    Order Status:  Sent Lab Status:  In process Updated:  01/08/19 0530    " Specimen:  Blood         Final Active Diagnoses:    Diagnosis Date Noted POA    PRINCIPAL PROBLEM:  SBO (small bowel obstruction) [K56.609] 01/06/2019 Yes      Problems Resolved During this Admission:      Discharged Condition: good    Disposition: Home or Self Care    Follow Up:  Follow-up Information     Poli Gonzalez MD.    Specialties:  Internal Medicine, Pediatrics  Contact information:  [address] [place] [address]  [phone]                 Patient Instructions:      Ambulatory Referral to Gastroenterology   Referral Priority: Routine Referral Type: Consultation   Referral Reason: Specialty Services Required   Requested Specialty: Gastroenterology   Number of Visits Requested: 1     Call MD for:  temperature >100.4     Call MD for:  persistent nausea and vomiting or diarrhea     Call MD for:  severe uncontrolled pain     Call MD for:  difficulty breathing or increased cough     Activity as tolerated     Medications:  Reconciled Home Medications:      Medication List      CONTINUE taking these medications    ACCU-CHEK SOFTCLIX LANCETS Misc  Generic drug:  lancets     allopurinol 300 MG tablet  Commonly known as:  ZYLOPRIM     aspirin 81 MG EC tablet  Commonly known as:  ECOTRIN  Take 81 mg by mouth once daily.     BD ALCOHOL SWABS Padm  Generic drug:  alcohol swabs     blood sugar diagnostic Strp  Commonly known as:  ONETOUCH ULTRA TEST  USE ONE STRIP TO CHECK GLUCOSE EVERY DAY     clopidogrel 75 mg tablet  Commonly known as:  PLAVIX  Take 75 mg by mouth once daily.     finasteride 5 mg tablet  Commonly known as:  PROSCAR  TAKE 1 TABLET ONE TIME DAILY     gabapentin 300 MG capsule  Commonly known as:  NEURONTIN  Take 2 capsules (600 mg total) by mouth 2 (two) times daily.     indomethacin 50 MG capsule  Commonly known as:  INDOCIN  Take 1 capsule (50 mg total) by mouth 2 (two) times daily with meals. For gout     lisinopril 5 MG tablet  Commonly known as:  PRINIVIL,ZESTRIL  TAKE ONE TABLET BY  MOUTH ONCE DAILY     metFORMIN 500 MG tablet  Commonly known as:  GLUCOPHAGE  TAKE 1 TABLET TWICE DAILY     metoprolol succinate 25 MG 24 hr tablet  Commonly known as:  TOPROL-XL  Take 25 mg by mouth once daily.     omeprazole 40 MG capsule  Commonly known as:  PRILOSEC  TAKE 1 CAPSULE EVERY DAY     pravastatin 40 MG tablet  Commonly known as:  PRAVACHOL  TAKE 1 TABLET EVERY DAY     TOLAK 4 % Crea  Generic drug:  fluorouracil  Apply 1 application topically once daily. To arms and hands for 3 weeks            Jonathan Lee MD  General Surgery  [facility]

## 2019-01-10 ENCOUNTER — PATIENT OUTREACH (OUTPATIENT)
Dept: ADMINISTRATIVE | Facility: CLINIC | Age: 69
End: 2019-01-10

## 2019-01-10 NOTE — PROGRESS NOTES
C3 nurse attempted to contact patient. The following occurred:   C3 nurse attempted to contact Jarrett for a TCC post hospital discharge follow up call. The patient is unable to conduct the call @ this time. The patient requested a callback.    The patient has a scheduled [facility] appointment with Poli Gonzalez MD on 01/1/19 @ 0830 am. Message sent to Physician staff.

## 2019-01-11 ENCOUNTER — OFFICE VISIT (OUTPATIENT)
Dept: PRIMARY CARE CLINIC | Facility: CLINIC | Age: 69
End: 2019-01-11
Payer: MEDICARE

## 2019-01-11 VITALS
SYSTOLIC BLOOD PRESSURE: 125 MMHG | BODY MASS INDEX: 25.15 KG/M2 | TEMPERATURE: 98 F | RESPIRATION RATE: 18 BRPM | HEART RATE: 99 BPM | WEIGHT: 196 LBS | OXYGEN SATURATION: 97 % | HEIGHT: 74 IN | DIASTOLIC BLOOD PRESSURE: 86 MMHG

## 2019-01-11 DIAGNOSIS — E83.39 HYPOPHOSPHATEMIA: ICD-10-CM

## 2019-01-11 DIAGNOSIS — R25.2 MUSCLE CRAMP: ICD-10-CM

## 2019-01-11 DIAGNOSIS — R10.9 ABDOMINAL CRAMPING: Primary | ICD-10-CM

## 2019-01-11 DIAGNOSIS — E83.42 HYPOMAGNESEMIA: ICD-10-CM

## 2019-01-11 PROCEDURE — 99999 PR PBB SHADOW E&M-EST. PATIENT-LVL IV: ICD-10-PCS | Mod: PBBFAC,,, | Performed by: INTERNAL MEDICINE

## 2019-01-11 PROCEDURE — 3074F PR MOST RECENT SYSTOLIC BLOOD PRESSURE < 130 MM HG: ICD-10-PCS | Mod: CPTII,S$GLB,, | Performed by: INTERNAL MEDICINE

## 2019-01-11 PROCEDURE — 1101F PR PT FALLS ASSESS DOC 0-1 FALLS W/OUT INJ PAST YR: ICD-10-PCS | Mod: CPTII,S$GLB,, | Performed by: INTERNAL MEDICINE

## 2019-01-11 PROCEDURE — 99213 OFFICE O/P EST LOW 20 MIN: CPT | Mod: S$GLB,,, | Performed by: INTERNAL MEDICINE

## 2019-01-11 PROCEDURE — 3074F SYST BP LT 130 MM HG: CPT | Mod: CPTII,S$GLB,, | Performed by: INTERNAL MEDICINE

## 2019-01-11 PROCEDURE — 99999 PR PBB SHADOW E&M-EST. PATIENT-LVL IV: CPT | Mod: PBBFAC,,, | Performed by: INTERNAL MEDICINE

## 2019-01-11 PROCEDURE — 3079F DIAST BP 80-89 MM HG: CPT | Mod: CPTII,S$GLB,, | Performed by: INTERNAL MEDICINE

## 2019-01-11 PROCEDURE — 3079F PR MOST RECENT DIASTOLIC BLOOD PRESSURE 80-89 MM HG: ICD-10-PCS | Mod: CPTII,S$GLB,, | Performed by: INTERNAL MEDICINE

## 2019-01-11 PROCEDURE — 99213 PR OFFICE/OUTPT VISIT, EST, LEVL III, 20-29 MIN: ICD-10-PCS | Mod: S$GLB,,, | Performed by: INTERNAL MEDICINE

## 2019-01-11 PROCEDURE — 1101F PT FALLS ASSESS-DOCD LE1/YR: CPT | Mod: CPTII,S$GLB,, | Performed by: INTERNAL MEDICINE

## 2019-01-11 RX ORDER — LISINOPRIL 2.5 MG/1
2.5 TABLET ORAL DAILY
COMMUNITY
End: 2019-01-31

## 2019-01-11 RX ORDER — LANOLIN ALCOHOL/MO/W.PET/CERES
400 CREAM (GRAM) TOPICAL DAILY
Qty: 30 TABLET | Refills: 3 | Status: SHIPPED | OUTPATIENT
Start: 2019-01-11 | End: 2019-01-31

## 2019-01-11 RX ORDER — PANTOPRAZOLE SODIUM 40 MG/1
40 TABLET, DELAYED RELEASE ORAL DAILY
COMMUNITY
End: 2020-04-17 | Stop reason: SINTOL

## 2019-01-11 RX ORDER — GABAPENTIN 600 MG/1
600 TABLET ORAL 2 TIMES DAILY
COMMUNITY
End: 2019-01-31

## 2019-01-11 RX ORDER — CHLORDIAZEPOXIDE HYDROCHLORIDE AND CLIDINIUM BROMIDE 5; 2.5 MG/1; MG/1
1 CAPSULE ORAL 2 TIMES DAILY PRN
Qty: 30 CAPSULE | Refills: 1 | Status: SHIPPED | OUTPATIENT
Start: 2019-01-11 | End: 2019-01-31

## 2019-01-11 NOTE — PATIENT INSTRUCTIONS
Patient need during more fluid including water and 10K gatorate  Need to repeat blood tests in 2 week when return follow-up  Keep appointment with GI for possible EGD scope

## 2019-01-11 NOTE — PROGRESS NOTES
Subjective:       Patient ID: Jarrett Charlton Jr. is a 68 y.o. male.    Chief Complaint: Hospital Follow Up    HPI  patient complained of here for follow-up from hospitalization for dizzy and passed sound from dehydration secondary to nausea vomiting and loose stool in the colostomy back patient have chronic leg g test in the morning sometimes very severe review left the hospital show  low potassium and low phosphate and continued to have abdominal cramping after the meal has been on multiple different medication without any improvement  Review of Systems    Objective:      Physical Exam   Constitutional: He is oriented to person, place, and time. He appears well-developed and well-nourished. No distress.   HENT:   Head: Normocephalic and atraumatic.   Right Ear: External ear normal.   Left Ear: External ear normal.   Nose: Nose normal.   Mouth/Throat: Oropharynx is clear and moist. No oropharyngeal exudate.   Eyes: Conjunctivae and EOM are normal. Pupils are equal, round, and reactive to light. Right eye exhibits no discharge. Left eye exhibits no discharge.   Neck: Normal range of motion. Neck supple. No thyromegaly present.   Cardiovascular: Normal rate, regular rhythm, normal heart sounds and intact distal pulses. Exam reveals no gallop and no friction rub.   No murmur heard.  Pulmonary/Chest: Effort normal and breath sounds normal. No respiratory distress. He has no wheezes. He has no rales. He exhibits no tenderness.   Abdominal: Soft. Bowel sounds are normal. He exhibits no distension. There is no tenderness. There is no rebound and no guarding.   Colostomy patent  no loose stool   Musculoskeletal: Normal range of motion. He exhibits no edema, tenderness or deformity.   Lymphadenopathy:     He has no cervical adenopathy.   Neurological: He is alert and oriented to person, place, and time.   Skin: Skin is warm and dry. Capillary refill takes less than 2 seconds. No rash noted. No erythema.   Psychiatric: He  has a normal mood and affect. Judgment and thought content normal.   Nursing note and vitals reviewed.      Assessment:       1. Abdominal cramping    2. Hypophosphatemia    3. Hypomagnesemia    4. Muscle cramp        Plan:       Abdominal cramping  Comments:  Patient will need follow-up with gastroenterologist  Orders:  -     chlordiazepoxide-clidinium 5-2.5 mg (LIBRAX) 5-2.5 mg Cap; Take 1 capsule by mouth 2 (two) times daily as needed. abd cramp  Dispense: 30 capsule; Refill: 1    Hypophosphatemia  -     potassium phosphate, monobasic, (K-PHOS) 500 mg TbSO; Take 1 tablet (500 mg total) by mouth once daily.  Dispense: 15 tablet; Refill: 0    Hypomagnesemia  -     magnesium oxide (MAG-OX) 400 mg (241.3 mg magnesium) tablet; Take 1 tablet (400 mg total) by mouth once daily.  Dispense: 30 tablet; Refill: 3    Muscle cramp  -     magnesium oxide (MAG-OX) 400 mg (241.3 mg magnesium) tablet; Take 1 tablet (400 mg total) by mouth once daily.  Dispense: 30 tablet; Refill: 3

## 2019-01-14 ENCOUNTER — ANESTHESIA EVENT (OUTPATIENT)
Dept: SURGERY | Facility: HOSPITAL | Age: 69
End: 2019-01-14
Payer: MEDICARE

## 2019-01-14 ENCOUNTER — OFFICE VISIT (OUTPATIENT)
Dept: UROLOGY | Facility: CLINIC | Age: 69
End: 2019-01-14
Payer: MEDICARE

## 2019-01-14 DIAGNOSIS — R97.20 ELEVATED PSA: Primary | ICD-10-CM

## 2019-01-14 PROCEDURE — 99499 UNLISTED E&M SERVICE: CPT | Mod: S$GLB,,, | Performed by: UROLOGY

## 2019-01-14 PROCEDURE — 99999 PR PBB SHADOW E&M-EST. PATIENT-LVL III: CPT | Mod: PBBFAC,,,

## 2019-01-14 PROCEDURE — 99499 NO LOS: ICD-10-PCS | Mod: S$GLB,,, | Performed by: UROLOGY

## 2019-01-14 PROCEDURE — 99999 PR PBB SHADOW E&M-EST. PATIENT-LVL III: ICD-10-PCS | Mod: PBBFAC,,,

## 2019-01-14 NOTE — ANESTHESIA PREPROCEDURE EVALUATION
Juliane Devine, RN   Registered Nurse      Pre Admission Screening   Signed                             []Hide copied text    []Hover for details      Anesthesia Assessment: Preoperative EQUATION     Planned Procedure: Procedure(s) (LRB):  TURP, WITHOUT USING LASER BIPOLAR (N/A)  Requested Anesthesia Type:General  Surgeon: Catarino Mota MD  Service: Urology  Known or anticipated Date of Surgery:1/23/2019     Surgeon notes: reviewed      Electronic QUestionnaire Assessment completed via nurse interview with patient.         NO AQ        Triage considerations:      The patient has no apparent active cardiac condition (No unstable coronary Syndrome such as severe unstable angina or recent [<1 month] myocardial infarction, decompensated CHF, severe valvular   disease or significant arrhythmia)     Previous anesthesia records:GETA, MAC, No problems and Not available-HX of cervical fusion     Last PCP note: within 1 month , within [facility] for hospitalization follow up after dehydration/abd cramping  Subspecialty notes: Cardiology: General     Other important co-morbidities: Carotid stenosis/stent, HTN/HLP, HX PVCs, DM2, UC/ileostomy, GERD, CKD     Tests already available:  Available tests,  within 1 month . 1/8/19 CBC, BMP. 1/7/19 A1c. 1/6/19 EKG. 6/2018 stress test                            Instructions given. (See in Nurse's note)     Optimization:  Anesthesia Preop Clinic Assessment  Indicated-not required for this procedure    Medical Opinion Indicated-will ask Dr Feliberto regalado for ASA/Plavix instructions                                        Plan:    Testing:  none                           Consultation:cardiology                           Patient  has previously scheduled Medical Appointment: 1/14 preop with surgeon     Navigation:                        Consults scheduled.                        Results will be tracked by Preop Clinic.                                    Electronically signed by Juliane  Sybil RN at 1/14/2019 10:25 AM       Pre-admit on 1/23/2019            Detailed Report      1/16/19 OS Dr Dao med instructions obtained and scanned to media                                                                                                              01/14/2019  Jarrett Charlton Jr. is a 68 y.o., male.    Anesthesia Evaluation    I have reviewed the Patient Summary Reports.     I have reviewed the Medications.     Review of Systems  Anesthesia Hx:  History of prior surgery of interest to airway management or planning: cervical fusion. Previous anesthesia: General Denies Family Hx of Anesthesia complications.   Denies Personal Hx of Anesthesia complications.   Social:  Non-Smoker    Hematology/Oncology:  Hematology Normal      Oncology Comments: HX SCC    EENT/Dental:EENT/Dental Normal   Cardiovascular:   Hypertension CAD  CABG/stent Dysrhythmias   Denies Angina. hyperlipidemia Stent last year-attempting to get cardiology notes from Dr Dao Functional Capacity good / => 4 METS  Carotoid Artery Disease  Hypertension , Well Controlled on Rx, Rx Recently Decreased  Disorder of Cardiac Rhythm (HX of PVCs)    Pulmonary:  Pulmonary Normal  Denies Shortness of breath.  Denies Recent URI.    Renal/:   Chronic Renal Disease Elevated PSA, bladder outflow obstruction Kidney Function/Disease, Chronic Kidney Disease (CKD)    Hepatic/GI:  Esophageal / Stomach Disorders Gerd Controlled by chronic antireflux medication.  Bowel Conditions:  Inflammatory Bowel Disease, Ulcerative Colitis (ileostomy 1985)    Musculoskeletal:   Arthritis     Neurological:  Neurology Normal    Endocrine:   Diabetes  Diabetes, Type 2 Diabetes , controlled by oral hypoglycemics. Typical AM glucose range: 124 , most recent HgA1c value was 6.6 on 1/7/19.    Psych:  Psychiatric Normal           Physical Exam  General:  Well nourished    Airway/Jaw/Neck:  Airway Findings: Mouth Opening: Normal Tongue: Normal  General Airway Assessment:  Adult  Mallampati: II  TM Distance: Normal, at least 6 cm         Dental:  DENTAL FINDINGS: Normal   Chest/Lungs:  Chest/Lungs Findings: Clear to auscultation, Normal Respiratory Rate     Heart/Vascular:  Heart Findings: Rate: Normal  Rhythm: Regular Rhythm        Mental Status:  Mental Status Findings:  Cooperative, Anxious, Alert and Oriented         Anesthesia Plan  Type of Anesthesia, risks & benefits discussed:  Anesthesia Type:  general  Patient's Preference:   Intra-op Monitoring Plan: standard ASA monitors  Intra-op Monitoring Plan Comments:   Post Op Pain Control Plan:   Post Op Pain Control Plan Comments:   Induction:   IV  Beta Blocker:  Patient is on a Beta-Blocker and has received one dose within the past 24 hours (No further documentation required).       Informed Consent: Patient understands risks and agrees with Anesthesia plan.  Questions answered. Anesthesia consent signed with patient.  ASA Score: 3     Day of Surgery Review of History & Physical:    H&P update referred to the surgeon.         Ready For Surgery From Anesthesia Perspective.

## 2019-01-14 NOTE — PRE ADMISSION SCREENING
Anesthesia Assessment: Preoperative EQUATION    Planned Procedure: Procedure(s) (LRB):  TURP, WITHOUT USING LASER BIPOLAR (N/A)  Requested Anesthesia Type:General  Surgeon: Catarino Mota MD  Service: Urology  Known or anticipated Date of Surgery:1/23/2019    Surgeon notes: reviewed     Electronic QUestionnaire Assessment completed via nurse interview with patient.        NO AQ      Triage considerations:     The patient has no apparent active cardiac condition (No unstable coronary Syndrome such as severe unstable angina or recent [<1 month] myocardial infarction, decompensated CHF, severe valvular   disease or significant arrhythmia)    Previous anesthesia records:GETA, MAC, No problems and Not available-HX of cervical fusion    Last PCP note: within 1 month , within [facility] for hospitalization follow up after dehydration/abd cramping  Subspecialty notes: Cardiology: General    Other important co-morbidities: Carotid stenosis/stent, HTN/HLP, HX PVCs, DM2, UC/ileostomy, GERD, CKD     Tests already available:  Available tests,  within 1 month . 1/8/19 CBC, BMP. 1/7/19 A1c. 1/6/19 EKG. 6/2018 stress test            Instructions given. (See in Nurse's note)    Optimization:  Anesthesia Preop Clinic Assessment  Indicated-not required for this procedure    Medical Opinion Indicated-will ask Dr Feliberto regalado for ASA/Plavix instructions          Plan:    Testing:  none     Consultation:cardiology     Patient  has previously scheduled Medical Appointment: 1/14 preop with surgeon    Navigation:             Consults scheduled.             Results will be tracked by Preop Clinic.

## 2019-01-14 NOTE — H&P (VIEW-ONLY)
Urology ([facility]) H&P  Staff: Catarino Mota MD    Is this patient in a research study?  No    CC: elevated PSA    HPI:  Jarrett Charlton Jr. is a 68 y.o. male with elevated PSA and LUTS.    He presented to Dr. Mota in 6/2015 with difficulty urinating, straining, incomplete emptying. He then underwent cysto which showed an open prostatic urethra s/p TURP. Obstruction was thought to be unlikely. He refused SUDS. PSA at that time was 4.3. He was started on Finasteride at that time and his PSA decreased to 2.1. His PSA was follow and in 5/2018 it linh to 3.1. He then underwent MRI which showed a PIRADS 4 lesion on the right side. He was then set to undergo CT guided prostate biopsy by IR however due to inability to come off his anticoagulation till Nov 2018 this was postponed. In Nov 2018 his PSA was rechecked and had risen to 4.1. He then underwent repeat MRI which again showed the PIRADS 4 lesion adjacent to the posterior resection bed. No other concerning findings were noted, volume 44 cc. He is now scheduled for TURP.     In clinic today he endorses some slowed stream, but denies other urinary symptoms and feels like he empties completely    Underwent cardiac stent placement in Novermber 2017. Currently on aspirin and plavix. He was approved by his cardiologist to stop both his aspirin and plavix prior to surgery.      Hx of colon resection with anal closure in 1985 for Crohn's Disease.     No family hx of prostate cancer.    ROS:  Neg except per HPI    Past Medical History:   Diagnosis Date    Basal cell carcinoma     BPH (benign prostatic hyperplasia)     s/p TURP    Carotid stenosis     Chronic kidney disease     CKD (chronic kidney disease)     Claudication     DDD (degenerative disc disease) 10/21/2013    Diabetes mellitus     Diabetes mellitus with renal complications     Disc disease, degenerative, cervical     GERD (gastroesophageal reflux disease)     Gout, chronic     History of ulcerative  colitis     s/p colectomy and ileostomy    HLD (hyperlipidemia)     HTN (hypertension)     Ileostomy in place 1982    PVC (premature ventricular contraction)     RBBB     Squamous cell carcinoma 03/08/2018    Left superior helix near insertion    Squamous cell carcinoma 04/12/2018    Left forearm x 5    Ventricular tachycardia        Past Surgical History:   Procedure Laterality Date    Fzfcvzock-Rnwuaunqyxk-Lt  11/10/2017    Performed by [facility]    cardiac stents      CATARACT EXTRACTION Bilateral     CERVICAL FUSION      colectomy and ileostomy      RAJENDRA-TRANSFORAMINAL N/A 6/4/2015    Performed by [facility] at [facility] CATH [facility]    RAJENDRA-TRANSFORAMINAL N/A 12/4/2013    Performed by [facility] at [facility] CATH LAB    INJECTION, STEROID, SPINE, LUMBAR, EPIDURAL N/A 10/21/2013    Performed by [facility] CATH LAB    INJECTION-STEROID-EPIDURAL N/A 5/27/2014    Performed by [facility] at [facility] CATH LAB    INJECTION-STEROID-EPIDURAL-LUMBAR N/A 11/30/2015    Performed by [facility] CATH LAB    INJECTION-STEROID-EPIDURAL-LUMBAR N/A 9/17/2014    Performed by [facility] at [facility] CATH LAB    Left heart cath Left 11/10/2017    Performed by Kiko [facility]ado MD at [facility] CATH LAB    Percutaneous coronary intervention  11/10/2017    Performed by K[facility]ado MD at [facility] CATH LAB    TRANSURETHRAL RESECTION OF PROSTATE         Social History     Socioeconomic History    Marital status:      Spouse name: Not on file    Number of children: Not on file    Years of education: Not on file    Highest education level: Not on file   Social Needs    Financial resource strain: Not on file    Food insecurity - worry: Not on file    Food insecurity - inability: Not on file    Transportation needs - medical: Not on file    Transportation needs - non-medical: Not on file   Occupational  History    Not on file   Tobacco Use    Smoking status: Never Smoker    Smokeless tobacco: Never Used   Substance and Sexual Activity    Alcohol use: No    Drug use: No    Sexual activity: Not on file   Other Topics Concern    Not on file   Social History Narrative    Not on file       Family History   Problem Relation Age of Onset    Melanoma Neg Hx     Hypertension Neg Hx     Diabetes Neg Hx     Arthritis Neg Hx        Review of patient's allergies indicates:  No Known Allergies    Current Outpatient Medications on File Prior to Visit   Medication Sig Dispense Refill    allopurinol (ZYLOPRIM) 300 MG tablet 1 1/2 - 2 a day      aspirin (ECOTRIN) 81 MG EC tablet Take 81 mg by mouth once daily.        BD ALCOHOL SWABS PadM       blood sugar diagnostic (ONE TOUCH ULTRA TEST) Strp USE ONE STRIP TO CHECK GLUCOSE EVERY  each 11    chlordiazepoxide-clidinium 5-2.5 mg (LIBRAX) 5-2.5 mg Cap Take 1 capsule by mouth 2 (two) times daily as needed. abd cramp 30 capsule 1    clopidogrel (PLAVIX) 75 mg tablet Take 75 mg by mouth once daily.      finasteride (PROSCAR) 5 mg tablet TAKE 1 TABLET ONE TIME DAILY 90 tablet 3    gabapentin (NEURONTIN) 600 MG tablet Take 600 mg by mouth 2 (two) times daily.      lisinopril (PRINIVIL,ZESTRIL) 2.5 MG tablet Take 2.5 mg by mouth once daily.      magnesium oxide (MAG-OX) 400 mg (241.3 mg magnesium) tablet Take 1 tablet (400 mg total) by mouth once daily. 30 tablet 3    metFORMIN (GLUCOPHAGE) 500 MG tablet TAKE 1 TABLET TWICE DAILY 180 tablet 3    metoprolol succinate (TOPROL-XL) 25 MG 24 hr tablet Take 25 mg by mouth once daily.      pantoprazole (PROTONIX) 40 MG tablet Take 40 mg by mouth once daily.      potassium phosphate, monobasic, (K-PHOS) 500 mg TbSO Take 1 tablet (500 mg total) by mouth once daily. 15 tablet 0    pravastatin (PRAVACHOL) 40 MG tablet TAKE 1 TABLET EVERY DAY 90 tablet 3     Current Facility-Administered Medications on File Prior to  Visit   Medication Dose Route Frequency Provider Last Rate Last Dose    lidocaine HCL 10 mg/ml (1%) injection 10 mL  10 mL Infiltration Once Catarino Mota MD        lidocaine HCL 2% jelly   Topical (Top) Once Catarino Moat MD           Anticoagulation:  Yes - aspirin 81mg planning to stop 1 week prior, plavix planning to stop 3 days prior. (approved by his cardiologist)     Physical Exam:  General: No acute distress, well developed. AAOx3  Head: Normocephalic, Atraumatic  Eyes: Extra-occular movements intact, No discharge  Neck: supple, symmetrical, trachea midline  Lungs: normal respiratory effort, no respiratory distress, no wheezes  CV: regular rate, 2+ pulses  Abdomen: soft, non-tender, non-distended, no organomegaly  : circumcised, orthotopic meatus, testicles descended bilaterally, no masses, normal contour, normal cord structures, no skin lesions.   MSK: no edema, no deformities, normal ROM  Skin: skin color, texture, turgor normal.  Neurologic: no focal deficits, sensation intact    Labs:    Urine dipstick today - negative for all components    Lab Results   Component Value Date    WBC 7.02 01/08/2019    HGB 13.7 (L) 01/08/2019    HCT 41.6 01/08/2019    MCV 93 01/08/2019     (L) 01/08/2019       BMP  Lab Results   Component Value Date     01/08/2019    K 4.3 01/08/2019     01/08/2019    CO2 21 (L) 01/08/2019    BUN 34 (H) 01/08/2019    CREATININE 1.6 (H) 01/08/2019    CALCIUM 8.9 01/08/2019    ANIONGAP 8 01/08/2019    ESTGFRAFRICA 50.1 (A) 01/08/2019    EGFRNONAA 43.3 (A) 01/08/2019       Lab Results   Component Value Date    PSA 2.1 09/15/2014    PSA 2.16 05/13/2013    PSA 1.2 03/19/2012       Imaging:      Prostate MRI 12/9/18:  The prostate measures 5.4 x 5.3 x 5.6cm corresponding to a computed volume of 44cc.  The patient is status post trans-urethral resection of the prostate resulting in technically difficult prostate segmentation.  Prostate size and volume measurements are  reported noting technical limitations.  There is redemonstration of a single lesion involving residual prostatic tissue along the posterior margin of the resection cavity.  Lesion is grossly unchanged in size from prior examination dated 06/19/2018.  Location: Side: Posterior margin of TURP resection cavity.  Greatest dimension: 1.2cm  Extraprostatic extension: Unknown  PI-RADS assessment category: 4  Neurovascular bundle: Unremarkable.  Seminal vesicles: Lesion abuts the anterior margin the seminal vesicles, however seminal vesicles retain normal signal characteristics.  Adjacent Organ Involvement: No focal bladder wall thickening rectal involvement.  Lymphadenopathy: none  Other Findings: Left fat containing inguinal hernia.  Postsurgical change of the colon with blind-ending rectal stump.      Assessment: Jarrett Charlton Jr. is a 68 y.o. male with elevated PSA and PIRADS 4 lesion in right posterior transition zone.     Plan:     1. To OR on 1/23/19 for TURP  2. Consents signed   3. I have explained the risk, benefits, and alternatives of the procedure in detail. The patient voices understanding and all questions have been answered. The patient agrees to proceed as planned.   4. Hold aspirin 1 week and plavix 3 days prior to procedure   5. Type and screen  6. Ancef ANAMARIA Rojas MD

## 2019-01-14 NOTE — PROGRESS NOTES
Urology ([facility]) H&P  Staff: Catarino Mota MD    Is this patient in a research study?  No    CC: elevated PSA    HPI:  Jarrett Charlton Jr. is a 68 y.o. male with elevated PSA and LUTS.    He presented to Dr. Mota in 6/2015 with difficulty urinating, straining, incomplete emptying. He then underwent cysto which showed an open prostatic urethra s/p TURP. Obstruction was thought to be unlikely. He refused SUDS. PSA at that time was 4.3. He was started on Finasteride at that time and his PSA decreased to 2.1. His PSA was follow and in 5/2018 it linh to 3.1. He then underwent MRI which showed a PIRADS 4 lesion on the right side. He was then set to undergo CT guided prostate biopsy by IR however due to inability to come off his anticoagulation till Nov 2018 this was postponed. In Nov 2018 his PSA was rechecked and had risen to 4.1. He then underwent repeat MRI which again showed the PIRADS 4 lesion adjacent to the posterior resection bed. No other concerning findings were noted, volume 44 cc. He is now scheduled for TURP.     In clinic today he endorses some slowed stream, but denies other urinary symptoms and feels like he empties completely    Underwent cardiac stent placement in Novermber 2017. Currently on aspirin and plavix. He was approved by his cardiologist to stop both his aspirin and plavix prior to surgery.      Hx of colon resection with anal closure in 1985 for Crohn's Disease.     No family hx of prostate cancer.    ROS:  Neg except per HPI    Past Medical History:   Diagnosis Date    Basal cell carcinoma     BPH (benign prostatic hyperplasia)     s/p TURP    Carotid stenosis     Chronic kidney disease     CKD (chronic kidney disease)     Claudication     DDD (degenerative disc disease) 10/21/2013    Diabetes mellitus     Diabetes mellitus with renal complications     Disc disease, degenerative, cervical     GERD (gastroesophageal reflux disease)     Gout, chronic     History of ulcerative  colitis     s/p colectomy and ileostomy    HLD (hyperlipidemia)     HTN (hypertension)     Ileostomy in place 1982    PVC (premature ventricular contraction)     RBBB     Squamous cell carcinoma 03/08/2018    Left superior helix near insertion    Squamous cell carcinoma 04/12/2018    Left forearm x 5    Ventricular tachycardia        Past Surgical History:   Procedure Laterality Date    Ptiuicekz-Npqjiqagmnb-Zz  11/10/2017    Performed by Kiko Alvarado MD at [facility] CATH LAB    cardiac stents      CATARACT EXTRACTION Bilateral     CERVICAL FUSION      colectomy and ileostomy      RAJENDRA-TRANSFORAMINAL N/A 6/4/2015    Performed by [facility] at [facility] CATH LAB    RAJENDRA-TRANSFORAMINAL N/A 12/4/2013    Performed by [facility] at [facility] CATH LAB    INJECTION, STEROID, SPINE, LUMBAR, EPIDURAL N/A 10/21/2013    Performed by [facility] CATH LAB    INJECTION-STEROID-EPIDURAL N/A 5/27/2014    Performed by [facility] at [facility] CATH LAB    INJECTION-STEROID-EPIDURAL-LUMBAR N/A 11/30/2015    Performed by [facility] at [facility] CATH LAB    INJECTION-STEROID-EPIDURAL-LUMBAR N/A 9/17/2014    Performed by [facility] at [facility] CATH LAB    Left heart cath Left 11/10/2017    Performed by Kiko Alvarado MD at [facility] CATH LAB    Percutaneous coronary intervention  11/10/2017    Performed by Kiko Alvarado MD at [facility] CATH LAB    TRANSURETHRAL RESECTION OF PROSTATE         Social History     Socioeconomic History    Marital status:      Spouse name: Not on file    Number of children: Not on file    Years of education: Not on file    Highest education level: Not on file   Social Needs    Financial resource strain: Not on file    Food insecurity - worry: Not on file    Food insecurity - inability: Not on file    Transportation needs - medical: Not on file    Transportation needs - non-medical: Not on file   Occupational  History    Not on file   Tobacco Use    Smoking status: Never Smoker    Smokeless tobacco: Never Used   Substance and Sexual Activity    Alcohol use: No    Drug use: No    Sexual activity: Not on file   Other Topics Concern    Not on file   Social History Narrative    Not on file       Family History   Problem Relation Age of Onset    Melanoma Neg Hx     Hypertension Neg Hx     Diabetes Neg Hx     Arthritis Neg Hx        Review of patient's allergies indicates:  No Known Allergies    Current Outpatient Medications on File Prior to Visit   Medication Sig Dispense Refill    allopurinol (ZYLOPRIM) 300 MG tablet 1 1/2 - 2 a day      aspirin (ECOTRIN) 81 MG EC tablet Take 81 mg by mouth once daily.        BD ALCOHOL SWABS PadM       blood sugar diagnostic (ONE TOUCH ULTRA TEST) Strp USE ONE STRIP TO CHECK GLUCOSE EVERY  each 11    chlordiazepoxide-clidinium 5-2.5 mg (LIBRAX) 5-2.5 mg Cap Take 1 capsule by mouth 2 (two) times daily as needed. abd cramp 30 capsule 1    clopidogrel (PLAVIX) 75 mg tablet Take 75 mg by mouth once daily.      finasteride (PROSCAR) 5 mg tablet TAKE 1 TABLET ONE TIME DAILY 90 tablet 3    gabapentin (NEURONTIN) 600 MG tablet Take 600 mg by mouth 2 (two) times daily.      lisinopril (PRINIVIL,ZESTRIL) 2.5 MG tablet Take 2.5 mg by mouth once daily.      magnesium oxide (MAG-OX) 400 mg (241.3 mg magnesium) tablet Take 1 tablet (400 mg total) by mouth once daily. 30 tablet 3    metFORMIN (GLUCOPHAGE) 500 MG tablet TAKE 1 TABLET TWICE DAILY 180 tablet 3    metoprolol succinate (TOPROL-XL) 25 MG 24 hr tablet Take 25 mg by mouth once daily.      pantoprazole (PROTONIX) 40 MG tablet Take 40 mg by mouth once daily.      potassium phosphate, monobasic, (K-PHOS) 500 mg TbSO Take 1 tablet (500 mg total) by mouth once daily. 15 tablet 0    pravastatin (PRAVACHOL) 40 MG tablet TAKE 1 TABLET EVERY DAY 90 tablet 3     Current Facility-Administered Medications on File Prior to  Visit   Medication Dose Route Frequency Provider Last Rate Last Dose    lidocaine HCL 10 mg/ml (1%) injection 10 mL  10 mL Infiltration Once Catarino Mota MD        lidocaine HCL 2% jelly   Topical (Top) Once Catarino Mota MD           Anticoagulation:  Yes - aspirin 81mg planning to stop 1 week prior, plavix planning to stop 3 days prior. (approved by his cardiologist)     Physical Exam:  General: No acute distress, well developed. AAOx3  Head: Normocephalic, Atraumatic  Eyes: Extra-occular movements intact, No discharge  Neck: supple, symmetrical, trachea midline  Lungs: normal respiratory effort, no respiratory distress, no wheezes  CV: regular rate, 2+ pulses  Abdomen: soft, non-tender, non-distended, no organomegaly  : circumcised, orthotopic meatus, testicles descended bilaterally, no masses, normal contour, normal cord structures, no skin lesions.   MSK: no edema, no deformities, normal ROM  Skin: skin color, texture, turgor normal.  Neurologic: no focal deficits, sensation intact    Labs:    Urine dipstick today - negative for all components    Lab Results   Component Value Date    WBC 7.02 01/08/2019    HGB 13.7 (L) 01/08/2019    HCT 41.6 01/08/2019    MCV 93 01/08/2019     (L) 01/08/2019       BMP  Lab Results   Component Value Date     01/08/2019    K 4.3 01/08/2019     01/08/2019    CO2 21 (L) 01/08/2019    BUN 34 (H) 01/08/2019    CREATININE 1.6 (H) 01/08/2019    CALCIUM 8.9 01/08/2019    ANIONGAP 8 01/08/2019    ESTGFRAFRICA 50.1 (A) 01/08/2019    EGFRNONAA 43.3 (A) 01/08/2019       Lab Results   Component Value Date    PSA 2.1 09/15/2014    PSA 2.16 05/13/2013    PSA 1.2 03/19/2012       Imaging:      Prostate MRI 12/9/18:  The prostate measures 5.4 x 5.3 x 5.6cm corresponding to a computed volume of 44cc.  The patient is status post trans-urethral resection of the prostate resulting in technically difficult prostate segmentation.  Prostate size and volume measurements are  reported noting technical limitations.  There is redemonstration of a single lesion involving residual prostatic tissue along the posterior margin of the resection cavity.  Lesion is grossly unchanged in size from prior examination dated 06/19/2018.  Location: Side: Posterior margin of TURP resection cavity.  Greatest dimension: 1.2cm  Extraprostatic extension: Unknown  PI-RADS assessment category: 4  Neurovascular bundle: Unremarkable.  Seminal vesicles: Lesion abuts the anterior margin the seminal vesicles, however seminal vesicles retain normal signal characteristics.  Adjacent Organ Involvement: No focal bladder wall thickening rectal involvement.  Lymphadenopathy: none  Other Findings: Left fat containing inguinal hernia.  Postsurgical change of the colon with blind-ending rectal stump.      Assessment: Jarrett Charlton Jr. is a 68 y.o. male with elevated PSA and PIRADS 4 lesion in right posterior transition zone.     Plan:     1. To OR on 1/23/19 for TURP  2. Consents signed   3. I have explained the risk, benefits, and alternatives of the procedure in detail. The patient voices understanding and all questions have been answered. The patient agrees to proceed as planned.   4. Hold aspirin 1 week and plavix 3 days prior to procedure   5. Type and screen  6. Ancef ANAMARIA Rojas MD

## 2019-01-22 ENCOUNTER — TELEPHONE (OUTPATIENT)
Dept: UROLOGY | Facility: CLINIC | Age: 69
End: 2019-01-22

## 2019-01-22 NOTE — TELEPHONE ENCOUNTER
Called pt to confirm 6:15am arrival time for procedure. Gave pt NPO instructions and gave pt opportunity to ask questions. Pt verbalized understanding.

## 2019-01-23 ENCOUNTER — ANESTHESIA (OUTPATIENT)
Dept: SURGERY | Facility: HOSPITAL | Age: 69
End: 2019-01-23
Payer: MEDICARE

## 2019-01-23 ENCOUNTER — HOSPITAL ENCOUNTER (OUTPATIENT)
Facility: HOSPITAL | Age: 69
Discharge: HOME | End: 2019-01-24
Attending: UROLOGY | Admitting: UROLOGY
Payer: MEDICARE

## 2019-01-23 DIAGNOSIS — R97.20 ELEVATED PSA: Primary | ICD-10-CM

## 2019-01-23 LAB
POCT GLUCOSE: 113 MG/DL (ref 70–110)
POCT GLUCOSE: 152 MG/DL (ref 70–110)
POCT GLUCOSE: 155 MG/DL (ref 70–110)
POCT GLUCOSE: 156 MG/DL (ref 70–110)

## 2019-01-23 PROCEDURE — 52630 REMOVE PROSTATE REGROWTH: CPT | Mod: ,,, | Performed by: UROLOGY

## 2019-01-23 PROCEDURE — 25000003 PHARM REV CODE 250: Performed by: STUDENT IN AN ORGANIZED HEALTH CARE EDUCATION/TRAINING PROGRAM

## 2019-01-23 PROCEDURE — 63600175 PHARM REV CODE 636 W HCPCS: Performed by: ANESTHESIOLOGY

## 2019-01-23 PROCEDURE — G0378 HOSPITAL OBSERVATION PER HR: HCPCS

## 2019-01-23 PROCEDURE — 63600175 PHARM REV CODE 636 W HCPCS: Performed by: STUDENT IN AN ORGANIZED HEALTH CARE EDUCATION/TRAINING PROGRAM

## 2019-01-23 PROCEDURE — D9220A PRA ANESTHESIA: ICD-10-PCS | Mod: CRNA,,, | Performed by: NURSE ANESTHETIST, CERTIFIED REGISTERED

## 2019-01-23 PROCEDURE — 25000003 PHARM REV CODE 250: Performed by: NURSE ANESTHETIST, CERTIFIED REGISTERED

## 2019-01-23 PROCEDURE — 71000016 HC POSTOP RECOV ADDL HR: Performed by: UROLOGY

## 2019-01-23 PROCEDURE — 36000707: Performed by: UROLOGY

## 2019-01-23 PROCEDURE — 88305 TISSUE SPECIMEN TO PATHOLOGY - SURGERY: ICD-10-PCS | Mod: 26,,, | Performed by: PATHOLOGY

## 2019-01-23 PROCEDURE — D9220A PRA ANESTHESIA: ICD-10-PCS | Mod: ANES,,, | Performed by: ANESTHESIOLOGY

## 2019-01-23 PROCEDURE — 63600175 PHARM REV CODE 636 W HCPCS: Performed by: NURSE ANESTHETIST, CERTIFIED REGISTERED

## 2019-01-23 PROCEDURE — 37000008 HC ANESTHESIA 1ST 15 MINUTES: Performed by: UROLOGY

## 2019-01-23 PROCEDURE — 71000044 HC DOSC ROUTINE RECOVERY FIRST HOUR: Performed by: UROLOGY

## 2019-01-23 PROCEDURE — 82962 GLUCOSE BLOOD TEST: CPT | Performed by: UROLOGY

## 2019-01-23 PROCEDURE — 36000706: Performed by: UROLOGY

## 2019-01-23 PROCEDURE — 37000009 HC ANESTHESIA EA ADD 15 MINS: Performed by: UROLOGY

## 2019-01-23 PROCEDURE — 71000015 HC POSTOP RECOV 1ST HR: Performed by: UROLOGY

## 2019-01-23 PROCEDURE — D9220A PRA ANESTHESIA: Mod: ANES,,, | Performed by: ANESTHESIOLOGY

## 2019-01-23 PROCEDURE — 88305 TISSUE EXAM BY PATHOLOGIST: CPT | Mod: 26,,, | Performed by: PATHOLOGY

## 2019-01-23 PROCEDURE — D9220A PRA ANESTHESIA: Mod: CRNA,,, | Performed by: NURSE ANESTHETIST, CERTIFIED REGISTERED

## 2019-01-23 PROCEDURE — 88305 TISSUE EXAM BY PATHOLOGIST: CPT | Performed by: PATHOLOGY

## 2019-01-23 PROCEDURE — 52630 PR REMV RESID OBSTRUC PROSTATE,>1 YR: ICD-10-PCS | Mod: ,,, | Performed by: UROLOGY

## 2019-01-23 RX ORDER — SODIUM CHLORIDE 9 MG/ML
INJECTION, SOLUTION INTRAVENOUS CONTINUOUS
Status: DISCONTINUED | OUTPATIENT
Start: 2019-01-23 | End: 2019-01-24

## 2019-01-23 RX ORDER — SODIUM CHLORIDE 0.9 % (FLUSH) 0.9 %
3 SYRINGE (ML) INJECTION
Status: DISCONTINUED | OUTPATIENT
Start: 2019-01-23 | End: 2019-01-23

## 2019-01-23 RX ORDER — METOPROLOL SUCCINATE 25 MG/1
25 TABLET, EXTENDED RELEASE ORAL DAILY
Status: DISCONTINUED | OUTPATIENT
Start: 2019-01-24 | End: 2019-01-24 | Stop reason: HOSPADM

## 2019-01-23 RX ORDER — ALLOPURINOL 100 MG/1
300 TABLET ORAL DAILY
Status: DISCONTINUED | OUTPATIENT
Start: 2019-01-23 | End: 2019-01-24 | Stop reason: HOSPADM

## 2019-01-23 RX ORDER — PRAVASTATIN SODIUM 40 MG/1
40 TABLET ORAL DAILY
Status: DISCONTINUED | OUTPATIENT
Start: 2019-01-23 | End: 2019-01-24 | Stop reason: HOSPADM

## 2019-01-23 RX ORDER — ONDANSETRON 2 MG/ML
INJECTION INTRAMUSCULAR; INTRAVENOUS
Status: DISCONTINUED | OUTPATIENT
Start: 2019-01-23 | End: 2019-01-23

## 2019-01-23 RX ORDER — LIDOCAINE HYDROCHLORIDE 20 MG/ML
JELLY TOPICAL
Status: DISCONTINUED | OUTPATIENT
Start: 2019-01-23 | End: 2019-01-23 | Stop reason: HOSPADM

## 2019-01-23 RX ORDER — HYOSCYAMINE SULFATE 0.12 MG/1
0.12 TABLET SUBLINGUAL EVERY 4 HOURS PRN
Status: DISCONTINUED | OUTPATIENT
Start: 2019-01-23 | End: 2019-01-24 | Stop reason: HOSPADM

## 2019-01-23 RX ORDER — LIDOCAINE HCL/PF 100 MG/5ML
SYRINGE (ML) INTRAVENOUS
Status: DISCONTINUED | OUTPATIENT
Start: 2019-01-23 | End: 2019-01-23

## 2019-01-23 RX ORDER — HYDROCODONE BITARTRATE AND ACETAMINOPHEN 10; 325 MG/1; MG/1
1 TABLET ORAL EVERY 4 HOURS PRN
Status: DISCONTINUED | OUTPATIENT
Start: 2019-01-23 | End: 2019-01-24 | Stop reason: HOSPADM

## 2019-01-23 RX ORDER — INSULIN ASPART 100 [IU]/ML
0-5 INJECTION, SOLUTION INTRAVENOUS; SUBCUTANEOUS
Status: DISCONTINUED | OUTPATIENT
Start: 2019-01-23 | End: 2019-01-24 | Stop reason: HOSPADM

## 2019-01-23 RX ORDER — CHLORDIAZEPOXIDE HYDROCHLORIDE AND CLIDINIUM BROMIDE 5; 2.5 MG/1; MG/1
1 CAPSULE ORAL 2 TIMES DAILY PRN
Status: DISCONTINUED | OUTPATIENT
Start: 2019-01-23 | End: 2019-01-24 | Stop reason: HOSPADM

## 2019-01-23 RX ORDER — SODIUM CHLORIDE 9 MG/ML
INJECTION, SOLUTION INTRAVENOUS CONTINUOUS PRN
Status: DISCONTINUED | OUTPATIENT
Start: 2019-01-23 | End: 2019-01-23

## 2019-01-23 RX ORDER — FENTANYL CITRATE 50 UG/ML
25 INJECTION, SOLUTION INTRAMUSCULAR; INTRAVENOUS EVERY 5 MIN PRN
Status: COMPLETED | OUTPATIENT
Start: 2019-01-23 | End: 2019-01-23

## 2019-01-23 RX ORDER — LISINOPRIL 2.5 MG/1
2.5 TABLET ORAL DAILY
Status: DISCONTINUED | OUTPATIENT
Start: 2019-01-24 | End: 2019-01-24 | Stop reason: HOSPADM

## 2019-01-23 RX ORDER — PANTOPRAZOLE SODIUM 40 MG/1
40 TABLET, DELAYED RELEASE ORAL DAILY
Status: DISCONTINUED | OUTPATIENT
Start: 2019-01-23 | End: 2019-01-24 | Stop reason: HOSPADM

## 2019-01-23 RX ORDER — FINASTERIDE 5 MG/1
5 TABLET, FILM COATED ORAL DAILY
Status: DISCONTINUED | OUTPATIENT
Start: 2019-01-23 | End: 2019-01-24 | Stop reason: HOSPADM

## 2019-01-23 RX ORDER — ONDANSETRON 2 MG/ML
4 INJECTION INTRAMUSCULAR; INTRAVENOUS EVERY 6 HOURS PRN
Status: DISCONTINUED | OUTPATIENT
Start: 2019-01-23 | End: 2019-01-24 | Stop reason: HOSPADM

## 2019-01-23 RX ORDER — PROPOFOL 10 MG/ML
VIAL (ML) INTRAVENOUS
Status: DISCONTINUED | OUTPATIENT
Start: 2019-01-23 | End: 2019-01-23

## 2019-01-23 RX ORDER — SODIUM CHLORIDE 0.9 % (FLUSH) 0.9 %
3 SYRINGE (ML) INJECTION
Status: DISCONTINUED | OUTPATIENT
Start: 2019-01-23 | End: 2019-01-24 | Stop reason: HOSPADM

## 2019-01-23 RX ORDER — CEFAZOLIN SODIUM 1 G/3ML
2 INJECTION, POWDER, FOR SOLUTION INTRAMUSCULAR; INTRAVENOUS
Status: COMPLETED | OUTPATIENT
Start: 2019-01-23 | End: 2019-01-23

## 2019-01-23 RX ORDER — MIDAZOLAM HYDROCHLORIDE 1 MG/ML
INJECTION, SOLUTION INTRAMUSCULAR; INTRAVENOUS
Status: DISCONTINUED | OUTPATIENT
Start: 2019-01-23 | End: 2019-01-23

## 2019-01-23 RX ORDER — ONDANSETRON 2 MG/ML
4 INJECTION INTRAMUSCULAR; INTRAVENOUS DAILY PRN
Status: DISCONTINUED | OUTPATIENT
Start: 2019-01-23 | End: 2019-01-23

## 2019-01-23 RX ORDER — IBUPROFEN 200 MG
24 TABLET ORAL
Status: DISCONTINUED | OUTPATIENT
Start: 2019-01-23 | End: 2019-01-24 | Stop reason: HOSPADM

## 2019-01-23 RX ORDER — HYDROCODONE BITARTRATE AND ACETAMINOPHEN 5; 325 MG/1; MG/1
1 TABLET ORAL EVERY 4 HOURS PRN
Status: DISCONTINUED | OUTPATIENT
Start: 2019-01-23 | End: 2019-01-24 | Stop reason: HOSPADM

## 2019-01-23 RX ORDER — GLUCAGON 1 MG
1 KIT INJECTION
Status: DISCONTINUED | OUTPATIENT
Start: 2019-01-23 | End: 2019-01-24 | Stop reason: HOSPADM

## 2019-01-23 RX ORDER — LIDOCAINE HYDROCHLORIDE 20 MG/ML
JELLY TOPICAL
Status: DISCONTINUED | OUTPATIENT
Start: 2019-01-23 | End: 2019-01-24 | Stop reason: HOSPADM

## 2019-01-23 RX ORDER — FENTANYL CITRATE 50 UG/ML
INJECTION, SOLUTION INTRAMUSCULAR; INTRAVENOUS
Status: DISCONTINUED | OUTPATIENT
Start: 2019-01-23 | End: 2019-01-23

## 2019-01-23 RX ORDER — IBUPROFEN 200 MG
16 TABLET ORAL
Status: DISCONTINUED | OUTPATIENT
Start: 2019-01-23 | End: 2019-01-24 | Stop reason: HOSPADM

## 2019-01-23 RX ORDER — GABAPENTIN 300 MG/1
600 CAPSULE ORAL 2 TIMES DAILY
Status: DISCONTINUED | OUTPATIENT
Start: 2019-01-23 | End: 2019-01-24 | Stop reason: HOSPADM

## 2019-01-23 RX ADMIN — GABAPENTIN 600 MG: 300 CAPSULE ORAL at 08:01

## 2019-01-23 RX ADMIN — HYDROCODONE BITARTRATE AND ACETAMINOPHEN 1 TABLET: 10; 325 TABLET ORAL at 01:01

## 2019-01-23 RX ADMIN — PROPOFOL 180 MG: 10 INJECTION, EMULSION INTRAVENOUS at 07:01

## 2019-01-23 RX ADMIN — LIDOCAINE HYDROCHLORIDE: 20 JELLY TOPICAL at 09:01

## 2019-01-23 RX ADMIN — FENTANYL CITRATE 25 MCG: 50 INJECTION INTRAMUSCULAR; INTRAVENOUS at 10:01

## 2019-01-23 RX ADMIN — PRAVASTATIN SODIUM 40 MG: 40 TABLET ORAL at 03:01

## 2019-01-23 RX ADMIN — SODIUM CHLORIDE: 0.9 INJECTION, SOLUTION INTRAVENOUS at 06:01

## 2019-01-23 RX ADMIN — FINASTERIDE 5 MG: 5 TABLET, FILM COATED ORAL at 04:01

## 2019-01-23 RX ADMIN — FENTANYL CITRATE 25 MCG: 50 INJECTION INTRAMUSCULAR; INTRAVENOUS at 08:01

## 2019-01-23 RX ADMIN — HYDROCODONE BITARTRATE AND ACETAMINOPHEN 1 TABLET: 5; 325 TABLET ORAL at 07:01

## 2019-01-23 RX ADMIN — ONDANSETRON 4 MG: 2 INJECTION INTRAMUSCULAR; INTRAVENOUS at 08:01

## 2019-01-23 RX ADMIN — LIDOCAINE HYDROCHLORIDE 75 MG: 20 INJECTION, SOLUTION INTRAVENOUS at 07:01

## 2019-01-23 RX ADMIN — FENTANYL CITRATE 25 MCG: 50 INJECTION INTRAMUSCULAR; INTRAVENOUS at 09:01

## 2019-01-23 RX ADMIN — FENTANYL CITRATE 50 MCG: 50 INJECTION, SOLUTION INTRAMUSCULAR; INTRAVENOUS at 07:01

## 2019-01-23 RX ADMIN — FENTANYL CITRATE 25 MCG: 50 INJECTION, SOLUTION INTRAMUSCULAR; INTRAVENOUS at 08:01

## 2019-01-23 RX ADMIN — PANTOPRAZOLE SODIUM 40 MG: 40 TABLET, DELAYED RELEASE ORAL at 04:01

## 2019-01-23 RX ADMIN — MIDAZOLAM HYDROCHLORIDE 2 MG: 1 INJECTION, SOLUTION INTRAMUSCULAR; INTRAVENOUS at 07:01

## 2019-01-23 RX ADMIN — ALLOPURINOL 300 MG: 100 TABLET ORAL at 04:01

## 2019-01-23 RX ADMIN — HYDROCODONE BITARTRATE AND ACETAMINOPHEN 1 TABLET: 10; 325 TABLET ORAL at 09:01

## 2019-01-23 RX ADMIN — SODIUM CHLORIDE: 0.9 INJECTION, SOLUTION INTRAVENOUS at 03:01

## 2019-01-23 RX ADMIN — CEFAZOLIN 2 G: 330 INJECTION, POWDER, FOR SOLUTION INTRAMUSCULAR; INTRAVENOUS at 07:01

## 2019-01-23 NOTE — NURSING TRANSFER
Nursing Transfer Note      1/23/2019     Transfer From: [facility] to 540A    Transfer via stretcher    Transported by [facility]    Medicines sent: CBI infusing    Chart send with patient: Yes    Notified: report called to receiving GHASSAN Montano    Patient reassessed at: 1/23/19 @ 1400

## 2019-01-23 NOTE — ANESTHESIA POSTPROCEDURE EVALUATION
"Anesthesia Post Evaluation    Patient: Jarrett Charlton Jr.    Procedure(s) Performed: Procedure(s) (LRB):  TURP, WITHOUT USING LASER BIPOLAR (N/A)    Final Anesthesia Type: general  Patient location during evaluation: PACU  Patient participation: Yes- Able to Participate  Level of consciousness: awake and alert  Post-procedure vital signs: reviewed and stable  Pain management: adequate  Airway patency: patent  PONV status at discharge: No PONV  Anesthetic complications: no      Cardiovascular status: hemodynamically stable  Respiratory status: unassisted, spontaneous ventilation and room air  Hydration status: euvolemic  Follow-up not needed.        Visit Vitals  BP (!) 184/100   Pulse 73   Temp 36.7 °C (98.1 °F) (Temporal)   Resp 10   Ht 6' 2" (1.88 m)   Wt 91.5 kg (201 lb 12.8 oz)   SpO2 100%   BMI 25.91 kg/m²       Pain/Jamie Score: Pain Rating Prior to Med Admin: 7 (1/23/2019 10:00 AM)  Jamie Score: 9 (1/23/2019  9:00 AM)        "

## 2019-01-23 NOTE — PLAN OF CARE
Patient assigned to this writer by charge nurse. The patient was  escorted to [facility] room 4 by PCT. The patient is currently  changing into a hospital gown. This writer is completing a chart review and reviewing MD orders.

## 2019-01-23 NOTE — PROGRESS NOTES
Pt still c/o severe burning pain at urethral meatus. Given tylenol, percocet, and applied 2% lidocaine jelly per orders with no improvement. Dr. WILMA Rojas with urology paged, awaiting call back

## 2019-01-23 NOTE — PLAN OF CARE
Problem: Adult Inpatient Plan of Care  Goal: Plan of Care Review  Outcome: Ongoing (interventions implemented as appropriate)  Plan of care reviewed and updated . Pt AA+O . Pt's pain is managed with medication ordered at this timed . Pt's V/S are as charted . No falls this shift. . Pt is oriented to room and call system . Will continue to monitor .

## 2019-01-23 NOTE — INTERVAL H&P NOTE
The patient has been examined and the H&P has been reviewed:    I concur with the findings and no changes have occurred since H&P was written.     Urine dipstick today negative for all components    Anesthesia/Surgery risks, benefits and alternative options discussed and understood by patient/family.          Active Hospital Problems    Diagnosis  POA    Elevated PSA [R97.20]  Yes      Resolved Hospital Problems   No resolved problems to display.

## 2019-01-23 NOTE — PROGRESS NOTES
Called into cysto OR to speak with resident. Informed that pt still c/o severe burning pain at urethral meatus where cathter inserted and urine is completely clear. Dr. Rojas states OK to take patient off traction and continue to monitor. Traction gauze and tape removed and replaced with stat lock to upper thigh without complication

## 2019-01-23 NOTE — TRANSFER OF CARE
"Anesthesia Transfer of Care Note    Patient: Jarrett Charlton Jr.    Procedure(s) Performed: Procedure(s) (LRB):  TURP, WITHOUT USING LASER BIPOLAR (N/A)    Patient location: PACU    Anesthesia Type: general    Transport from OR: Transported from OR on room air with adequate spontaneous ventilation    Post pain: adequate analgesia    Post assessment: no apparent anesthetic complications and tolerated procedure well    Post vital signs: stable    Level of consciousness: awake and alert    Nausea/Vomiting: no nausea/vomiting    Complications: none    Transfer of care protocol was followed      Last vitals:   Visit Vitals  BP (!) 173/97 (BP Location: Left arm, Patient Position: Lying)   Pulse 88   Temp 36.8 °C (98.2 °F) (Oral)   Resp 18   Ht 6' 2" (1.88 m)   Wt 91.5 kg (201 lb 12.8 oz)   SpO2 97%   BMI 25.91 kg/m²     "

## 2019-01-23 NOTE — OP NOTE
[facility]  Operative Note    Date: 01/23/2019    Pre-Op Diagnosis: History of rising PSA     Post-Op Diagnosis: same    Procedure(s) Performed:   TURP    Specimen(s): prostate chips    Staff Surgeon: Catarino Mota MD    Assistant Surgeon: Jesus Rojas MD    Anesthesia: General LMA anesthesia    Indications: Jarrett Charlton Jr. is a 68 y.o. male with and no rectum as well as concerning PIRADS lesion on MRI, plan for TURP for biopsying suspicious area     Findings:   Open bladder neck some regrowth of adenoma and suspicious area in right mid prostate   Right side of prostate resected and sent for pathology    Estimated Blood Loss: 5ml    Drains: 24 Fr 3-way lee catheter    Procedure in detail:  After the risks and benefits of the procedure were explained, consent was obtained, and the patient was taken to the operating suite and placed in the supine position.  Anesthesia was administered.  When adequately sedated, the patient was placed in dorsal lithotomy position and prepped and draped in normal sterile fashion.  Timeout was performed and preoperative ABx were confirmed.      A 26-Czech sheath resectoscope was placed into the bladder using a visual obturator. The visual obturator was exchanged for the resecting mechanism. The ureteral orifices were identified. There was a relatively open bladder neck and some regrowth of adenoma on the right side of the prostate, we then started our resection at the right lateral lobe of the prostate.The right lateral lobe was then resected in a stepwise fashion from 7 o'clock to 12 o'clock with care to leave the verumontanum and sphincter intact. Hemostasis was then achieved.    The ureteral orifices, verumontanum and sphincter were intact. The Nicole evacuator was used to remove all prostate chips and again hemostasis was obtained.     Once the bladder was drained, we could see that he had an open channel and the right side was resected and the scope was  removed.  A 24 Fr 3-way catheter was placed in the bladder with 50 mL of water in the balloon. The patient was placed on traction and on CBI. The patient was transferred to recovery in stable condition.     Disposition: The patient will remain on the urology service overnight for observation and CBI will be titrated.    Jesus Rojas MD

## 2019-01-24 VITALS
HEIGHT: 74 IN | OXYGEN SATURATION: 98 % | WEIGHT: 201.81 LBS | RESPIRATION RATE: 18 BRPM | TEMPERATURE: 95 F | BODY MASS INDEX: 25.9 KG/M2 | DIASTOLIC BLOOD PRESSURE: 94 MMHG | SYSTOLIC BLOOD PRESSURE: 167 MMHG | HEART RATE: 74 BPM

## 2019-01-24 LAB — POCT GLUCOSE: 131 MG/DL (ref 70–110)

## 2019-01-24 PROCEDURE — G0378 HOSPITAL OBSERVATION PER HR: HCPCS

## 2019-01-24 PROCEDURE — 25000003 PHARM REV CODE 250: Performed by: STUDENT IN AN ORGANIZED HEALTH CARE EDUCATION/TRAINING PROGRAM

## 2019-01-24 RX ORDER — HYOSCYAMINE SULFATE 0.125 MG
125 TABLET ORAL EVERY 4 HOURS PRN
Qty: 21 TABLET | Refills: 0 | Status: SHIPPED | OUTPATIENT
Start: 2019-01-24 | End: 2019-01-31

## 2019-01-24 RX ORDER — HYDROCODONE BITARTRATE AND ACETAMINOPHEN 5; 325 MG/1; MG/1
1 TABLET ORAL EVERY 6 HOURS PRN
Qty: 7 TABLET | Refills: 0 | Status: SHIPPED | OUTPATIENT
Start: 2019-01-24 | End: 2019-01-31

## 2019-01-24 RX ORDER — SODIUM CHLORIDE 9 MG/ML
INJECTION, SOLUTION INTRAVENOUS CONTINUOUS
Status: DISCONTINUED | OUTPATIENT
Start: 2019-01-24 | End: 2019-01-24 | Stop reason: HOSPADM

## 2019-01-24 RX ORDER — SULFAMETHOXAZOLE AND TRIMETHOPRIM 800; 160 MG/1; MG/1
1 TABLET ORAL 2 TIMES DAILY
Qty: 6 TABLET | Refills: 0 | Status: SHIPPED | OUTPATIENT
Start: 2019-01-24 | End: 2019-01-27

## 2019-01-24 RX ADMIN — METOPROLOL SUCCINATE 25 MG: 25 TABLET, EXTENDED RELEASE ORAL at 08:01

## 2019-01-24 RX ADMIN — PRAVASTATIN SODIUM 40 MG: 40 TABLET ORAL at 08:01

## 2019-01-24 RX ADMIN — ALLOPURINOL 300 MG: 100 TABLET ORAL at 08:01

## 2019-01-24 RX ADMIN — PANTOPRAZOLE SODIUM 40 MG: 40 TABLET, DELAYED RELEASE ORAL at 08:01

## 2019-01-24 RX ADMIN — GABAPENTIN 600 MG: 300 CAPSULE ORAL at 08:01

## 2019-01-24 RX ADMIN — LISINOPRIL 2.5 MG: 2.5 TABLET ORAL at 08:01

## 2019-01-24 RX ADMIN — FINASTERIDE 5 MG: 5 TABLET, FILM COATED ORAL at 08:01

## 2019-01-24 NOTE — SUBJECTIVE & OBJECTIVE
Interval History:   CBI clamped this AM, urine clear, Little removed on rounds  Ambulating  Tolerating diet  Pain controlled    Review of Systems  Objective:     Temp:  [96.1 °F (35.6 °C)-98.1 °F (36.7 °C)] 97.8 °F (36.6 °C)  Pulse:  [65-84] 65  Resp:  [10-18] 18  SpO2:  [93 %-100 %] 97 %  BP: (132-184)/() 149/85     Body mass index is 25.91 kg/m².            Drains     Drain                 Ileostomy 01/07/19 1101 RLQ 16 days         Urethral Catheter 01/23/19 0834 Triple-lumen;Latex 24 Fr. less than 1 day                Physical Exam   Nursing note and vitals reviewed.  Constitutional: He is oriented to person, place, and time. He appears well-developed and well-nourished. No distress.   Cardiovascular: Normal rate.    Pulmonary/Chest: Effort normal. No accessory muscle usage. No respiratory distress.   Abdominal: Soft. He exhibits no distension. There is no tenderness.   Genitourinary:   Genitourinary Comments: Little clear yellow with CBI clamped  Removed on rounds   Musculoskeletal: He exhibits no edema.   Neurological: He is alert and oriented to person, place, and time.       Significant Labs:    BMP:  No results for input(s): NA, K, CL, CO2, BUN, CREATININE, LABGLOM, GLUCOSE, CALCIUM in the last 168 hours.    CBC:   No results for input(s): WBC, HGB, HCT, PLT in the last 168 hours.    All pertinent labs results from the past 24 hours have been reviewed.    Significant Imaging:  All pertinent imaging results/findings from the past 24 hours have been reviewed.

## 2019-01-24 NOTE — ASSESSMENT & PLAN NOTE
POD 1 TURP    -IVF Hep lock  -PO pain meds  -Regular diet  -Little removed today, voiding trial  -DVT ppx: SCDs, TEDs  -GI ppx: protonix    Home after voiding trial

## 2019-01-24 NOTE — DISCHARGE SUMMARY
[facility]  Urology  Discharge Summary      Patient Name: Jarrett Charlton Jr.  MRN: [id number]  Admission Date: 1/23/2019  Hospital Length of Stay: 0 days  Discharge Date and Time: 1/24/2019  9:48 AM  Attending Physician: Catarino Mota MD  Discharging Provider: Jesus Rojas MD  Primary Care Physician: Poli Gonzalez MD    HPI:   He presented to Dr. Mota in 6/2015 with difficulty urinating, straining, incomplete emptying. He then underwent cysto which showed an open prostatic urethra s/p TURP. Obstruction was thought to be unlikely. He refused SUDS. PSA at that time was 4.3. He was started on Finasteride at that time and his PSA decreased to 2.1. His PSA was follow and in 5/2018 it linh to 3.1. He then underwent MRI which showed a PIRADS 4 lesion on the right side. He was then set to undergo CT guided prostate biopsy by IR however due to inability to come off his anticoagulation till Nov 2018 this was postponed. In Nov 2018 his PSA was rechecked and had risen to 4.1. He then underwent repeat MRI which again showed the PIRADS 4 lesion adjacent to the posterior resection bed. No other concerning findings were noted, volume 44 cc. He is now scheduled for TURP.      In clinic today he endorses some slowed stream, but denies other urinary symptoms and feels like he empties completely     Underwent cardiac stent placement in Novermber 2017. Currently on aspirin and plavix. He was approved by his cardiologist to stop both his aspirin and plavix prior to surgery.       Hx of colon resection with anal closure in 1985 for Crohn's Disease.     No family hx of prostate cancer.      Procedure(s) (LRB):  TURP, WITHOUT USING LASER BIPOLAR (N/A)     Indwelling Lines/Drains at time of discharge:   Lines/Drains/Airways     Drain                 Ileostomy 01/07/19 1101 RLQ 17 days                Hospital Course   1/23/19: patient underwent TURP and tolerated the surgery well. He ambulated tolerated a regular diet and his  pain was well controlled  1/24/19: CBI stopped and urine was clear, Little was removed and he passed a voiding trial. He was stabel for discharge with follow up with Dr. Mota in 1 week to review pathology.    Consults:  none    Significant Diagnostic Studies: none    Pending Diagnostic Studies:     None          Final Active Diagnoses:    Diagnosis Date Noted POA    PRINCIPAL PROBLEM:  Elevated PSA [R97.20] 06/23/2015 Yes      Problems Resolved During this Admission:       Discharged Condition: good    Disposition: Home or Self Care    Follow Up:  Follow-up Information     Catarino Mota MD In 1 week.    Specialty:  Urology  Why:  to review pathology  Contact information:  [address]  [place] [id number]  [phone]                 Patient Instructions:      Notify your health care provider if you experience any of the following:  temperature >100.4     Notify your health care provider if you experience any of the following:  persistent nausea and vomiting or diarrhea     Notify your health care provider if you experience any of the following:  severe uncontrolled pain     Notify your health care provider if you experience any of the following:  difficulty breathing or increased cough     Notify your health care provider if you experience any of the following:  severe persistent headache     Notify your health care provider if you experience any of the following:  worsening rash     Notify your health care provider if you experience any of the following:  persistent dizziness, light-headedness, or visual disturbances     Notify your health care provider if you experience any of the following:  increased confusion or weakness     Activity as tolerated     Medications:  Reconciled Home Medications:      Medication List      START taking these medications    HYDROcodone-acetaminophen 5-325 mg per tablet  Commonly known as:  NORCO  Take 1 tablet by mouth every 6 (six) hours as needed for Pain.     hyoscyamine  0.125 mg Tab  Commonly known as:  ANASPAZ,LEVSIN  Take 1 tablet (125 mcg total) by mouth every 4 (four) hours as needed (bladder spasm).     sulfamethoxazole-trimethoprim 800-160mg 800-160 mg Tab  Commonly known as:  BACTRIM DS  Take 1 tablet by mouth 2 (two) times daily. for 3 days        CONTINUE taking these medications    allopurinol 300 MG tablet  Commonly known as:  ZYLOPRIM  1 1/2 - 2 a day     aspirin 81 MG EC tablet  Commonly known as:  ECOTRIN  Take 81 mg by mouth once daily.     BD ALCOHOL SWABS Padm  Generic drug:  alcohol swabs     blood sugar diagnostic Strp  Commonly known as:  ONETOUCH ULTRA TEST  USE ONE STRIP TO CHECK GLUCOSE EVERY DAY     chlordiazepoxide-clidinium 5-2.5 mg 5-2.5 mg Cap  Commonly known as:  LIBRAX  Take 1 capsule by mouth 2 (two) times daily as needed. abd cramp     clopidogrel 75 mg tablet  Commonly known as:  PLAVIX  Take 75 mg by mouth once daily.     finasteride 5 mg tablet  Commonly known as:  PROSCAR  TAKE 1 TABLET ONE TIME DAILY     gabapentin 600 MG tablet  Commonly known as:  NEURONTIN  Take 600 mg by mouth 2 (two) times daily.     lisinopril 2.5 MG tablet  Commonly known as:  PRINIVIL,ZESTRIL  Take 2.5 mg by mouth once daily.     magnesium oxide 400 mg (241.3 mg magnesium) tablet  Commonly known as:  MAG-OX  Take 1 tablet (400 mg total) by mouth once daily.     metFORMIN 500 MG tablet  Commonly known as:  GLUCOPHAGE  TAKE 1 TABLET TWICE DAILY     metoprolol succinate 25 MG 24 hr tablet  Commonly known as:  TOPROL-XL  Take 25 mg by mouth once daily.     pantoprazole 40 MG tablet  Commonly known as:  PROTONIX  Take 40 mg by mouth once daily.     potassium phosphate (monobasic) 500 mg Tbso  Commonly known as:  K-PHOS  Take 1 tablet (500 mg total) by mouth once daily.     pravastatin 40 MG tablet  Commonly known as:  PRAVACHOL  TAKE 1 TABLET EVERY DAY            Time spent on the discharge of patient: 25 minutes    Jesus Rojas MD  Urology  [facility]

## 2019-01-24 NOTE — PROGRESS NOTES
[facility]  Urology  Progress Note    Patient Name: Jarrett Charlton Jr.  MRN: [id number]  Admission Date: 1/23/2019  Hospital Length of Stay: 0 days  Code Status: Full Code   Attending Provider: Catarino Mota MD   Primary Care Physician: Poli Gonzalez MD    Subjective:     HPI:  No notes on file    Interval History:   CBI clamped this AM, urine clear, Little removed on rounds  Ambulating  Tolerating diet  Pain controlled    Review of Systems  Objective:     Temp:  [96.1 °F (35.6 °C)-98.1 °F (36.7 °C)] 97.8 °F (36.6 °C)  Pulse:  [65-84] 65  Resp:  [10-18] 18  SpO2:  [93 %-100 %] 97 %  BP: (132-184)/() 149/85     Body mass index is 25.91 kg/m².            Drains     Drain                 Ileostomy 01/07/19 1101 RLQ 16 days         Urethral Catheter 01/23/19 0834 Triple-lumen;Latex 24 Fr. less than 1 day                Physical Exam   Nursing note and vitals reviewed.  Constitutional: He is oriented to person, place, and time. He appears well-developed and well-nourished. No distress.   Cardiovascular: Normal rate.    Pulmonary/Chest: Effort normal. No accessory muscle usage. No respiratory distress.   Abdominal: Soft. He exhibits no distension. There is no tenderness.   Genitourinary:   Genitourinary Comments: Little clear yellow with CBI clamped  Removed on rounds   Musculoskeletal: He exhibits no edema.   Neurological: He is alert and oriented to person, place, and time.       Significant Labs:    BMP:  No results for input(s): NA, K, CL, CO2, BUN, CREATININE, LABGLOM, GLUCOSE, CALCIUM in the last 168 hours.    CBC:   No results for input(s): WBC, HGB, HCT, PLT in the last 168 hours.    All pertinent labs results from the past 24 hours have been reviewed.    Significant Imaging:  All pertinent imaging results/findings from the past 24 hours have been reviewed.                  Assessment/Plan:     * Elevated PSA    POD 1 TURP    -IVF Hep lock  -PO pain meds  -Regular diet  -Little removed today,  voiding trial  -DVT ppx: SCDs, TEDs  -GI ppx: protonix    Home after voiding trial           VTE Risk Mitigation (From admission, onward)        Ordered     IP VTE HIGH RISK PATIENT  Once      01/23/19 1506     Place sequential compression device  Until discontinued      01/23/19 1506          Jesus Rojas MD  Urology  [facility]

## 2019-01-24 NOTE — NURSING
Pt discharged to home via wheelchair. Pt given prescriptions and copy of discharged papers instructions. Pt denies pain at this time. Pt educated on signs and symptoms of when to call the doctor. All belongings with the patient . Pt verbalized understanding.

## 2019-01-29 ENCOUNTER — TELEPHONE (OUTPATIENT)
Dept: UROLOGY | Facility: CLINIC | Age: 69
End: 2019-01-29

## 2019-01-29 ENCOUNTER — OFFICE VISIT (OUTPATIENT)
Dept: SURGERY | Facility: CLINIC | Age: 69
End: 2019-01-29
Payer: MEDICARE

## 2019-01-29 VITALS
WEIGHT: 201 LBS | BODY MASS INDEX: 25.8 KG/M2 | SYSTOLIC BLOOD PRESSURE: 120 MMHG | HEIGHT: 74 IN | HEART RATE: 86 BPM | DIASTOLIC BLOOD PRESSURE: 83 MMHG

## 2019-01-29 DIAGNOSIS — R97.20 ELEVATED PSA: Primary | ICD-10-CM

## 2019-01-29 DIAGNOSIS — K94.13 ILEOSTOMY DYSFUNCTION: Primary | ICD-10-CM

## 2019-01-29 DIAGNOSIS — E86.0 DEHYDRATION: ICD-10-CM

## 2019-01-29 PROCEDURE — 1101F PR PT FALLS ASSESS DOC 0-1 FALLS W/OUT INJ PAST YR: ICD-10-PCS | Mod: CPTII,S$GLB,, | Performed by: COLON & RECTAL SURGERY

## 2019-01-29 PROCEDURE — 99204 OFFICE O/P NEW MOD 45 MIN: CPT | Mod: S$GLB,,, | Performed by: COLON & RECTAL SURGERY

## 2019-01-29 PROCEDURE — 3079F DIAST BP 80-89 MM HG: CPT | Mod: CPTII,S$GLB,, | Performed by: COLON & RECTAL SURGERY

## 2019-01-29 PROCEDURE — 99204 PR OFFICE/OUTPT VISIT, NEW, LEVL IV, 45-59 MIN: ICD-10-PCS | Mod: S$GLB,,, | Performed by: COLON & RECTAL SURGERY

## 2019-01-29 PROCEDURE — 99999 PR PBB SHADOW E&M-EST. PATIENT-LVL V: CPT | Mod: PBBFAC,,, | Performed by: COLON & RECTAL SURGERY

## 2019-01-29 PROCEDURE — 1101F PT FALLS ASSESS-DOCD LE1/YR: CPT | Mod: CPTII,S$GLB,, | Performed by: COLON & RECTAL SURGERY

## 2019-01-29 PROCEDURE — 3074F PR MOST RECENT SYSTOLIC BLOOD PRESSURE < 130 MM HG: ICD-10-PCS | Mod: CPTII,S$GLB,, | Performed by: COLON & RECTAL SURGERY

## 2019-01-29 PROCEDURE — 3074F SYST BP LT 130 MM HG: CPT | Mod: CPTII,S$GLB,, | Performed by: COLON & RECTAL SURGERY

## 2019-01-29 PROCEDURE — 99999 PR PBB SHADOW E&M-EST. PATIENT-LVL V: ICD-10-PCS | Mod: PBBFAC,,, | Performed by: COLON & RECTAL SURGERY

## 2019-01-29 PROCEDURE — 3079F PR MOST RECENT DIASTOLIC BLOOD PRESSURE 80-89 MM HG: ICD-10-PCS | Mod: CPTII,S$GLB,, | Performed by: COLON & RECTAL SURGERY

## 2019-01-29 NOTE — PROGRESS NOTES
CRS Office Visit History and Physical    Referring Md:   Beverly Self  No address on file    SUBJECTIVE:     Chief Complaint: ileostomy problems    History of Present Illness:  The patient is new patient to this practice.   Course is as follows:  Patient is a 68 y.o. male presents with ileostomy dysfunction  History of ulcerative colitis s/p total proctocolectomy with end ileostomy (1985)    He has been doing well for multiple years.  Over the past 6-8 years, he has had intermittent episodes of dehydration and syncope.  This is 2-3 times per year.  He empties his bag 6-7 times per day.  Changes is pouch every 3-4 days.  No issues with his perineal incision. Overall feels well and is active.    Syncopal episodes require ED transfer and frequent hospital admission.    Review of patient's allergies indicates:  No Known Allergies    Past Medical History:   Diagnosis Date    Basal cell carcinoma     BPH (benign prostatic hyperplasia)     s/p TURP    Carotid stenosis     Chronic kidney disease     CKD (chronic kidney disease)     Claudication     DDD (degenerative disc disease) 10/21/2013    Diabetes mellitus     Diabetes mellitus with renal complications     Disc disease, degenerative, cervical     GERD (gastroesophageal reflux disease)     Gout, chronic     History of ulcerative colitis     s/p colectomy and ileostomy    HLD (hyperlipidemia)     HTN (hypertension)     Ileostomy in place 1982    PVC (premature ventricular contraction)     RBBB     Squamous cell carcinoma 03/08/2018    Left superior helix near insertion    Squamous cell carcinoma 04/12/2018    Left forearm x 5    Ventricular tachycardia      Past Surgical History:   Procedure Laterality Date    Tyfmyupwl-Juypspvuopu-Pu  11/10/2017    Performed by [facility]ado [facility] at [facility] CATH LAB    cardiac stents      CATARACT EXTRACTION Bilateral     CERVICAL FUSION      colectomy and ileostomy      RAJENDRA-TRANSFORAMINAL N/A 6/4/2015     Performed by Dos Diagnostic Provider at [facility] CATH LAB    RAJENDRA-TRANSFORAMINAL N/A 12/4/2013    Performed by Dosc Diagnostic Provider at [facility] CATH LAB    INJECTION, STEROID, SPINE, LUMBAR, EPIDURAL N/A 10/21/2013    Performed by Dos Diagnostic Provider at [facility] CATH LAB    INJECTION-STEROID-EPIDURAL N/A 5/27/2014    Performed by Dosc Diagnostic Provider at [facility] CATH LAB    INJECTION-STEROID-EPIDURAL-LUMBAR N/A 11/30/2015    Performed by Dosc Diagnostic Provider at [facility] CATH LAB    INJECTION-STEROID-EPIDURAL-LUMBAR N/A 9/17/2014    Performed by Dos Diagnostic Provider at [facility] CATH LAB    Left heart cath Left 11/10/2017    Performed by Kiko [facility]ado MD at [facility] CATH LAB    Percutaneous coronary intervention  11/10/2017    Performed by Kiko Alvarado MD at [facility] CATH LAB    TRANSURETHRAL RESECTION OF PROSTATE      TURP, WITHOUT USING LASER BIPOLAR N/A 1/23/2019    Performed by Cat[facility]ino Mota MD at [facility] OR [address] [place] [address]     Family History   Problem Relation Age of Onset    Melanoma Neg Hx     Hypertension Neg Hx     Diabetes Neg Hx     Arthritis Neg Hx      Social History     Tobacco Use    Smoking status: Never Smoker    Smokeless tobacco: Never Used   Substance Use Topics    Alcohol use: No    Drug use: No        Review of Systems:  Review of Systems   Constitutional: Positive for malaise/fatigue. Negative for chills, diaphoresis, fever and weight loss.   HENT: Negative for congestion.    Respiratory: Negative for shortness of breath.    Cardiovascular: Negative for chest pain and leg swelling.   Gastrointestinal: Positive for diarrhea. Negative for abdominal pain, blood in stool, constipation, nausea and vomiting.   Genitourinary: Negative for dysuria.   Musculoskeletal: Negative for back pain and myalgias.   Skin: Negative for rash.   Neurological: Positive for sensory change. Negative for dizziness and weakness.   Endo/Heme/Allergies: Does not bruise/bleed easily.   Psychiatric/Behavioral: Negative  "for depression.       OBJECTIVE:     Vital Signs (Most Recent)  /83 (BP Location: Left arm)   Pulse 86   Ht 6' 2" (1.88 m)   Wt 91.2 kg (201 lb)   BMI 25.81 kg/m²     Physical Exam:  General: White male in no distress   Neuro: alert and oriented x 4.  Moves all extremities.     HEENT: no icterus.  Trachea midline  Respiratory: respirations are even and unlabored  Cardiac: regular rate  Abdomen:  Midline laparotomy is well-healed.  Prior ileostomy site in the left upper quadrant is also well healed.  Ileostomy currently sits in the right upper quadrant is pink protrudes above the level of the skin.  Extremities: Warm dry and intact  Skin: no rashes  Anorectal:  Deferred    Labs: H/H = 14/42.  Cr = 1.6    Imaging: CT from 1/6/19 personally reviewed.  Small peristomal hernia.  No other pathology seen.       ASSESSMENT/PLAN:     Jarrett was seen today for ostomy problems.    Diagnoses and all orders for this visit:    Ileostomy dysfunction    Dehydration        68-year-old gentleman with ulcerative colitis status post total procto colectomy and end ileostomy in 1985 presents with intermittent dehydration and syncopal episodes.  We discussed management of the high-output ileostomy.  He will start on Imodium liquid.  I will see him back in 4 weeks and we can ramp up his anti diarrhea medication.    CED Haley MD  Staff Surgeon  Colon & Rectal Surgery    "

## 2019-01-30 NOTE — TELEPHONE ENCOUNTER
Pathology 1/23/19  S/p TURP    SPECIMEN  1) Right prostate chips.  FINAL PATHOLOGIC DIAGNOSIS  Right prostate gland, transurethral resection:  - Benign prostatic tissue with nodular stromal hyperplasia  - Negative for malignancy    Elevated PSA  -     Prostate Specific Antigen, Diagnostic; Future; Expected date: 01/29/2019    The results were given to pt.  Cancel his post op visit on 2/8.  Will see him in 6 months with PSA.

## 2019-01-31 ENCOUNTER — OFFICE VISIT (OUTPATIENT)
Dept: PRIMARY CARE CLINIC | Facility: CLINIC | Age: 69
End: 2019-01-31
Payer: MEDICARE

## 2019-01-31 VITALS
WEIGHT: 201 LBS | HEIGHT: 74 IN | TEMPERATURE: 98 F | RESPIRATION RATE: 18 BRPM | OXYGEN SATURATION: 96 % | HEART RATE: 85 BPM | DIASTOLIC BLOOD PRESSURE: 72 MMHG | BODY MASS INDEX: 25.8 KG/M2 | SYSTOLIC BLOOD PRESSURE: 111 MMHG

## 2019-01-31 DIAGNOSIS — Z93.2 PRESENCE OF ILEOSTOMY: ICD-10-CM

## 2019-01-31 DIAGNOSIS — E11.29 TYPE 2 DIABETES MELLITUS WITH OTHER DIABETIC KIDNEY COMPLICATION, WITHOUT LONG-TERM CURRENT USE OF INSULIN: Primary | ICD-10-CM

## 2019-01-31 PROCEDURE — 3074F PR MOST RECENT SYSTOLIC BLOOD PRESSURE < 130 MM HG: ICD-10-PCS | Mod: CPTII,S$GLB,, | Performed by: INTERNAL MEDICINE

## 2019-01-31 PROCEDURE — 3078F PR MOST RECENT DIASTOLIC BLOOD PRESSURE < 80 MM HG: ICD-10-PCS | Mod: CPTII,S$GLB,, | Performed by: INTERNAL MEDICINE

## 2019-01-31 PROCEDURE — 99999 PR PBB SHADOW E&M-EST. PATIENT-LVL IV: CPT | Mod: PBBFAC,,, | Performed by: INTERNAL MEDICINE

## 2019-01-31 PROCEDURE — 1101F PR PT FALLS ASSESS DOC 0-1 FALLS W/OUT INJ PAST YR: ICD-10-PCS | Mod: CPTII,S$GLB,, | Performed by: INTERNAL MEDICINE

## 2019-01-31 PROCEDURE — 3044F HG A1C LEVEL LT 7.0%: CPT | Mod: CPTII,S$GLB,, | Performed by: INTERNAL MEDICINE

## 2019-01-31 PROCEDURE — 3074F SYST BP LT 130 MM HG: CPT | Mod: CPTII,S$GLB,, | Performed by: INTERNAL MEDICINE

## 2019-01-31 PROCEDURE — 3078F DIAST BP <80 MM HG: CPT | Mod: CPTII,S$GLB,, | Performed by: INTERNAL MEDICINE

## 2019-01-31 PROCEDURE — 99213 OFFICE O/P EST LOW 20 MIN: CPT | Mod: S$GLB,,, | Performed by: INTERNAL MEDICINE

## 2019-01-31 PROCEDURE — 1101F PT FALLS ASSESS-DOCD LE1/YR: CPT | Mod: CPTII,S$GLB,, | Performed by: INTERNAL MEDICINE

## 2019-01-31 PROCEDURE — 99213 PR OFFICE/OUTPT VISIT, EST, LEVL III, 20-29 MIN: ICD-10-PCS | Mod: S$GLB,,, | Performed by: INTERNAL MEDICINE

## 2019-01-31 PROCEDURE — 3044F PR MOST RECENT HEMOGLOBIN A1C LEVEL <7.0%: ICD-10-PCS | Mod: CPTII,S$GLB,, | Performed by: INTERNAL MEDICINE

## 2019-01-31 PROCEDURE — 99999 PR PBB SHADOW E&M-EST. PATIENT-LVL IV: ICD-10-PCS | Mod: PBBFAC,,, | Performed by: INTERNAL MEDICINE

## 2019-01-31 RX ORDER — METFORMIN HYDROCHLORIDE 1000 MG/1
1000 TABLET ORAL 2 TIMES DAILY WITH MEALS
Status: ON HOLD | COMMUNITY
End: 2019-12-31 | Stop reason: HOSPADM

## 2019-01-31 RX ORDER — LISINOPRIL 5 MG/1
2.5 TABLET ORAL 2 TIMES DAILY
COMMUNITY
End: 2019-04-08

## 2019-02-01 ENCOUNTER — TELEPHONE (OUTPATIENT)
Dept: PRIMARY CARE CLINIC | Facility: CLINIC | Age: 69
End: 2019-02-01

## 2019-02-01 PROBLEM — Z93.2 PRESENCE OF ILEOSTOMY: Status: ACTIVE | Noted: 2019-02-01

## 2019-02-01 RX ORDER — DIPHENOXYLATE HCL/ATROPINE 2.5-.025/5
5 LIQUID (ML) ORAL
Qty: 600 ML | Refills: 5 | Status: SHIPPED | OUTPATIENT
Start: 2019-02-01 | End: 2019-02-04

## 2019-02-01 NOTE — PROGRESS NOTES
Subjective:       Patient ID: Jarrett Charlton Jr. is a 68 y.o. male.    Chief Complaint: Follow-up    HPI patient is here for follow-up from hospitalization for recurrent syncope from high output for ileostomy with electrolyte abnormality patient seen by:  Rectal surgeon put on medication to control diarrhea which seemed to help [place] better with control of high output from ileostomy currently no physical complain and his diabetes well control blood pressure stable and patient does not believe in flu vaccine and pneumococcal vaccine  Review of Systems    Objective:      Physical Exam   Constitutional: He is oriented to person, place, and time. He appears well-developed and well-nourished. No distress.   HENT:   Head: Normocephalic and atraumatic.   Right Ear: External ear normal.   Left Ear: External ear normal.   Nose: Nose normal.   Mouth/Throat: Oropharynx is clear and moist. No oropharyngeal exudate.   Eyes: Conjunctivae and EOM are normal. Pupils are equal, round, and reactive to light. Right eye exhibits no discharge. Left eye exhibits no discharge.   Neck: Normal range of motion. Neck supple. No thyromegaly present.   Cardiovascular: Normal rate, regular rhythm, normal heart sounds and intact distal pulses. Exam reveals no gallop and no friction rub.   No murmur heard.  Pulses:       Dorsalis pedis pulses are 2+ on the right side, and 2+ on the left side.        Posterior tibial pulses are 2+ on the right side, and 2+ on the left side.   Pulmonary/Chest: Effort normal and breath sounds normal. No respiratory distress. He has no wheezes. He has no rales. He exhibits no tenderness.   Abdominal: Soft. Bowel sounds are normal. He exhibits no distension. There is no tenderness. There is no rebound and no guarding.   Musculoskeletal: Normal range of motion. He exhibits no edema, tenderness or deformity.        Right foot: There is normal range of motion and no deformity.        Left foot: There is normal range of  motion and no deformity.   Feet:   Right Foot:   Protective Sensation: 3 sites tested. 3 sites sensed.   Skin Integrity: Negative for ulcer, skin breakdown or callus.   Left Foot:   Protective Sensation: 3 sites tested. 3 sites sensed.   Skin Integrity: Negative for ulcer, skin breakdown or callus.   Lymphadenopathy:     He has no cervical adenopathy.   Neurological: He is alert and oriented to person, place, and time.   Skin: Skin is warm and dry. Capillary refill takes less than 2 seconds. No rash noted. No erythema.   Psychiatric: He has a normal mood and affect. Judgment and thought content normal.   Nursing note and vitals reviewed.      Assessment:       1. Type 2 diabetes mellitus with other diabetic kidney complication, without long-term current use of insulin    2. 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

## 2019-02-01 NOTE — TELEPHONE ENCOUNTER
----- Message from Nikki Haro sent at 2/1/2019  4:30 PM CST -----  Contact: Jarrett  Type: Needs Medical Advice    Who Called:  patient  Best Call Back Number: [phone]  Additional Information: patient is currently in [facility] ER--he went in with dehydration & felt like he was going to pass out--ER Dr states that he see Dr. Gonzalez on Monday--I tried to schedule but no appts available--please advise--thank you

## 2019-02-04 ENCOUNTER — TELEPHONE (OUTPATIENT)
Dept: PRIMARY CARE CLINIC | Facility: CLINIC | Age: 69
End: 2019-02-04

## 2019-02-04 ENCOUNTER — OFFICE VISIT (OUTPATIENT)
Dept: PRIMARY CARE CLINIC | Facility: CLINIC | Age: 69
End: 2019-02-04
Payer: MEDICARE

## 2019-02-04 VITALS
SYSTOLIC BLOOD PRESSURE: 122 MMHG | BODY MASS INDEX: 25.55 KG/M2 | OXYGEN SATURATION: 96 % | RESPIRATION RATE: 18 BRPM | WEIGHT: 199 LBS | TEMPERATURE: 98 F | HEART RATE: 78 BPM | DIASTOLIC BLOOD PRESSURE: 78 MMHG

## 2019-02-04 DIAGNOSIS — N30.00 ACUTE CYSTITIS WITHOUT HEMATURIA: ICD-10-CM

## 2019-02-04 DIAGNOSIS — I10 ESSENTIAL HYPERTENSION: ICD-10-CM

## 2019-02-04 DIAGNOSIS — Z90.79 S/P TURP: ICD-10-CM

## 2019-02-04 DIAGNOSIS — Z93.2 HIGH OUTPUT ILEOSTOMY: Primary | ICD-10-CM

## 2019-02-04 DIAGNOSIS — M10.9 ACUTE GOUT OF LEFT ELBOW, UNSPECIFIED CAUSE: ICD-10-CM

## 2019-02-04 DIAGNOSIS — R55 RECURRENT SYNCOPE: ICD-10-CM

## 2019-02-04 DIAGNOSIS — R19.8 HIGH OUTPUT ILEOSTOMY: Primary | ICD-10-CM

## 2019-02-04 PROCEDURE — 99999 PR PBB SHADOW E&M-EST. PATIENT-LVL IV: CPT | Mod: PBBFAC,,, | Performed by: INTERNAL MEDICINE

## 2019-02-04 PROCEDURE — 99213 PR OFFICE/OUTPT VISIT, EST, LEVL III, 20-29 MIN: ICD-10-PCS | Mod: 25,S$GLB,, | Performed by: INTERNAL MEDICINE

## 2019-02-04 PROCEDURE — 3078F DIAST BP <80 MM HG: CPT | Mod: CPTII,S$GLB,, | Performed by: INTERNAL MEDICINE

## 2019-02-04 PROCEDURE — 96372 PR INJECTION,THERAP/PROPH/DIAG2ST, IM OR SUBCUT: ICD-10-PCS | Mod: S$GLB,,, | Performed by: INTERNAL MEDICINE

## 2019-02-04 PROCEDURE — 1101F PT FALLS ASSESS-DOCD LE1/YR: CPT | Mod: CPTII,S$GLB,, | Performed by: INTERNAL MEDICINE

## 2019-02-04 PROCEDURE — 3078F PR MOST RECENT DIASTOLIC BLOOD PRESSURE < 80 MM HG: ICD-10-PCS | Mod: CPTII,S$GLB,, | Performed by: INTERNAL MEDICINE

## 2019-02-04 PROCEDURE — 96372 THER/PROPH/DIAG INJ SC/IM: CPT | Mod: S$GLB,,, | Performed by: INTERNAL MEDICINE

## 2019-02-04 PROCEDURE — 3074F PR MOST RECENT SYSTOLIC BLOOD PRESSURE < 130 MM HG: ICD-10-PCS | Mod: CPTII,S$GLB,, | Performed by: INTERNAL MEDICINE

## 2019-02-04 PROCEDURE — 3074F SYST BP LT 130 MM HG: CPT | Mod: CPTII,S$GLB,, | Performed by: INTERNAL MEDICINE

## 2019-02-04 PROCEDURE — 99999 PR PBB SHADOW E&M-EST. PATIENT-LVL IV: ICD-10-PCS | Mod: PBBFAC,,, | Performed by: INTERNAL MEDICINE

## 2019-02-04 PROCEDURE — 99213 OFFICE O/P EST LOW 20 MIN: CPT | Mod: 25,S$GLB,, | Performed by: INTERNAL MEDICINE

## 2019-02-04 PROCEDURE — 1101F PR PT FALLS ASSESS DOC 0-1 FALLS W/OUT INJ PAST YR: ICD-10-PCS | Mod: CPTII,S$GLB,, | Performed by: INTERNAL MEDICINE

## 2019-02-04 RX ORDER — PRAVASTATIN SODIUM 40 MG/1
TABLET ORAL
Qty: 90 TABLET | Refills: 3 | Status: SHIPPED | OUTPATIENT
Start: 2019-02-04 | End: 2019-10-02 | Stop reason: SDUPTHER

## 2019-02-04 RX ORDER — BETAMETHASONE SODIUM PHOSPHATE AND BETAMETHASONE ACETATE 3; 3 MG/ML; MG/ML
12 INJECTION, SUSPENSION INTRA-ARTICULAR; INTRALESIONAL; INTRAMUSCULAR; SOFT TISSUE
Status: COMPLETED | OUTPATIENT
Start: 2019-02-04 | End: 2019-02-04

## 2019-02-04 RX ORDER — DIPHENOXYLATE HYDROCHLORIDE AND ATROPINE SULFATE 2.5; .025 MG/1; MG/1
1 TABLET ORAL 3 TIMES DAILY PRN
Qty: 90 TABLET | Refills: 1 | Status: SHIPPED | OUTPATIENT
Start: 2019-02-04 | End: 2019-02-15

## 2019-02-04 RX ORDER — PREDNISONE 20 MG/1
20 TABLET ORAL 2 TIMES DAILY
Qty: 10 TABLET | Refills: 0 | Status: SHIPPED | OUTPATIENT
Start: 2019-02-04 | End: 2019-02-09

## 2019-02-04 RX ADMIN — BETAMETHASONE SODIUM PHOSPHATE AND BETAMETHASONE ACETATE 12 MG: 3; 3 INJECTION, SUSPENSION INTRA-ARTICULAR; INTRALESIONAL; INTRAMUSCULAR; SOFT TISSUE at 11:02

## 2019-02-04 NOTE — TELEPHONE ENCOUNTER
"Patient recently seen in clinic, stopped back in to ask if the physician can send him in a prescription for Lomotil. Per patient "Dr. Gonzalez said he would call it in for me." Pended RX. Please sign.   "

## 2019-02-05 ENCOUNTER — TELEPHONE (OUTPATIENT)
Dept: PRIMARY CARE CLINIC | Facility: CLINIC | Age: 69
End: 2019-02-05

## 2019-02-05 NOTE — PROGRESS NOTES
Subjective:       Patient ID: Jarrtet Charlton Jr. is a 68 y.o. male.    Chief Complaint: Follow-up and Gout (left elbow)    HPI patient had an tender visit to the due to his syncope palm high output ileostomy with superficial stool in the colostomy bag it chest happen for no warning or any change in diet or activity low multiple liquid stools coli in by his the colon and rectal surgeon but disease 150 dollar patient cannot afford request to change to oral pill no other complain of never see GI in the past  Review of Systems    Objective:      Physical Exam   Constitutional: He is oriented to person, place, and time. He appears well-developed and well-nourished. No distress.   HENT:   Head: Normocephalic and atraumatic.   Right Ear: External ear normal.   Left Ear: External ear normal.   Nose: Nose normal.   Mouth/Throat: Oropharynx is clear and moist. No oropharyngeal exudate.   Eyes: Conjunctivae and EOM are normal. Pupils are equal, round, and reactive to light. Right eye exhibits no discharge. Left eye exhibits no discharge.   Neck: Normal range of motion. Neck supple. No thyromegaly present.   Cardiovascular: Normal rate, regular rhythm, normal heart sounds and intact distal pulses. Exam reveals no gallop and no friction rub.   No murmur heard.  Pulmonary/Chest: Effort normal and breath sounds normal. No respiratory distress. He has no wheezes. He has no rales. He exhibits no tenderness.   Abdominal: Soft. Bowel sounds are normal. He exhibits no distension. There is no tenderness. There is no rebound and no guarding.   And colostomy back on the right abdomen with soft stool currently no liquid   Musculoskeletal: Normal range of motion. He exhibits no edema, tenderness or deformity.   Lymphadenopathy:     He has no cervical adenopathy.   Neurological: He is alert and oriented to person, place, and time.   Skin: Skin is warm and dry. Capillary refill takes less than 2 seconds. No rash noted. No erythema.    Psychiatric: He has a normal mood and affect. Judgment and thought content normal.   Nursing note and vitals reviewed.      Assessment:       1. High output ileostomy    2. Acute gout of left elbow, unspecified cause    3. Acute cystitis without hematuria    4. S/P TURP    5. Essential hypertension    6. Recurrent syncope        Plan:       High output ileostomy  Comments:  Will get opinion from gastroenterologist to control the diarrhea  Orders:  -     Ambulatory referral to Gastroenterology    Acute gout of left elbow, unspecified cause  -     betamethasone acetate-betamethasone sodium phosphate injection 12 mg  -     predniSONE (DELTASONE) 20 MG tablet; Take 1 tablet (20 mg total) by mouth 2 (two) times daily. for 5 days  Dispense: 10 tablet; Refill: 0    Acute cystitis without hematuria    S/P TURP  Comments:  Has UTI has Cipro or the at the pharmacy will  and use repeat UA in 2 weeks    Essential hypertension  Comments:  Stable indication    Recurrent syncope  Comments:  From high output ileostomy with dehydration

## 2019-02-05 NOTE — TELEPHONE ENCOUNTER
----- Message from Nannette Wallis LPN sent at 2/4/2019  5:01 PM CST -----  Contact: Mirna blount/[employer] [phone]      ----- Message -----  From: Trish Bravo  Sent: 2/4/2019   4:54 PM  To: Carlos Ashton Staff    Please call her with the diagnosis code for the lomotil.  Thank you!

## 2019-02-05 NOTE — TELEPHONE ENCOUNTER
Called pharmacy and they stated that it was taken care of already that patient got it refilled by his doctor that originally prescribed it in the past.

## 2019-02-12 ENCOUNTER — TELEPHONE (OUTPATIENT)
Dept: PRIMARY CARE CLINIC | Facility: CLINIC | Age: 69
End: 2019-02-12

## 2019-02-12 NOTE — TELEPHONE ENCOUNTER
"Spoke with patient, no urine results available. Patient c/o "bleeding" from the front. Had an urology procedure 2 weeks ago. Appointment scheduled with PCP for 2/15/19 at 10:45.   "

## 2019-02-12 NOTE — TELEPHONE ENCOUNTER
----- Message from Jess Silverio sent at 2/12/2019  9:45 AM CST -----  Contact: Patient  Type:  Test Results    Who Called:  Jarrett patient  Name of Test (Lab/Mammo/Etc):  Urine  Date of Test:  02/08/219  Ordering Provider:  Dr Gonzalez  Where the test was performed:  Office  Best Call Back Number:  [phone]  Additional Information:  Please call him. Thanks.

## 2019-02-13 ENCOUNTER — TELEPHONE (OUTPATIENT)
Dept: ADMINISTRATIVE | Facility: HOSPITAL | Age: 69
End: 2019-02-13

## 2019-02-15 ENCOUNTER — OFFICE VISIT (OUTPATIENT)
Dept: PRIMARY CARE CLINIC | Facility: CLINIC | Age: 69
End: 2019-02-15
Payer: MEDICARE

## 2019-02-15 ENCOUNTER — TELEPHONE (OUTPATIENT)
Dept: PRIMARY CARE CLINIC | Facility: CLINIC | Age: 69
End: 2019-02-15

## 2019-02-15 VITALS
HEIGHT: 74 IN | HEART RATE: 78 BPM | RESPIRATION RATE: 18 BRPM | DIASTOLIC BLOOD PRESSURE: 68 MMHG | SYSTOLIC BLOOD PRESSURE: 107 MMHG | OXYGEN SATURATION: 96 % | BODY MASS INDEX: 26.07 KG/M2 | WEIGHT: 203.13 LBS | TEMPERATURE: 99 F

## 2019-02-15 DIAGNOSIS — Z90.79 S/P TURP: ICD-10-CM

## 2019-02-15 DIAGNOSIS — R80.9 PROTEINURIA, UNSPECIFIED TYPE: ICD-10-CM

## 2019-02-15 DIAGNOSIS — N30.01 ACUTE CYSTITIS WITH HEMATURIA: Primary | ICD-10-CM

## 2019-02-15 LAB
BILIRUB SERPL-MCNC: NEGATIVE MG/DL
BLOOD URINE, POC: ABNORMAL
COLOR, POC UA: ABNORMAL
GLUCOSE UR QL STRIP: NEGATIVE
KETONES UR QL STRIP: NEGATIVE
LEUKOCYTE ESTERASE URINE, POC: ABNORMAL
NITRITE, POC UA: NEGATIVE
PH, POC UA: 5
PROTEIN, POC: 30
SPECIFIC GRAVITY, POC UA: 1.01
UROBILINOGEN, POC UA: NEGATIVE

## 2019-02-15 PROCEDURE — 81002 POCT URINE DIPSTICK WITHOUT MICROSCOPE: ICD-10-PCS | Mod: S$GLB,,, | Performed by: INTERNAL MEDICINE

## 2019-02-15 PROCEDURE — 99999 PR PBB SHADOW E&M-EST. PATIENT-LVL IV: ICD-10-PCS | Mod: PBBFAC,,, | Performed by: INTERNAL MEDICINE

## 2019-02-15 PROCEDURE — 3074F PR MOST RECENT SYSTOLIC BLOOD PRESSURE < 130 MM HG: ICD-10-PCS | Mod: CPTII,S$GLB,, | Performed by: INTERNAL MEDICINE

## 2019-02-15 PROCEDURE — 3078F DIAST BP <80 MM HG: CPT | Mod: CPTII,S$GLB,, | Performed by: INTERNAL MEDICINE

## 2019-02-15 PROCEDURE — 1101F PT FALLS ASSESS-DOCD LE1/YR: CPT | Mod: CPTII,S$GLB,, | Performed by: INTERNAL MEDICINE

## 2019-02-15 PROCEDURE — 3074F SYST BP LT 130 MM HG: CPT | Mod: CPTII,S$GLB,, | Performed by: INTERNAL MEDICINE

## 2019-02-15 PROCEDURE — 81002 URINALYSIS NONAUTO W/O SCOPE: CPT | Mod: S$GLB,,, | Performed by: INTERNAL MEDICINE

## 2019-02-15 PROCEDURE — 99999 PR PBB SHADOW E&M-EST. PATIENT-LVL IV: CPT | Mod: PBBFAC,,, | Performed by: INTERNAL MEDICINE

## 2019-02-15 PROCEDURE — 1101F PR PT FALLS ASSESS DOC 0-1 FALLS W/OUT INJ PAST YR: ICD-10-PCS | Mod: CPTII,S$GLB,, | Performed by: INTERNAL MEDICINE

## 2019-02-15 PROCEDURE — 99213 OFFICE O/P EST LOW 20 MIN: CPT | Mod: 25,S$GLB,, | Performed by: INTERNAL MEDICINE

## 2019-02-15 PROCEDURE — 87086 URINE CULTURE/COLONY COUNT: CPT

## 2019-02-15 PROCEDURE — 99213 PR OFFICE/OUTPT VISIT, EST, LEVL III, 20-29 MIN: ICD-10-PCS | Mod: 25,S$GLB,, | Performed by: INTERNAL MEDICINE

## 2019-02-15 PROCEDURE — 3078F PR MOST RECENT DIASTOLIC BLOOD PRESSURE < 80 MM HG: ICD-10-PCS | Mod: CPTII,S$GLB,, | Performed by: INTERNAL MEDICINE

## 2019-02-15 RX ORDER — NITROFURANTOIN 25; 75 MG/1; MG/1
100 CAPSULE ORAL 2 TIMES DAILY
Qty: 20 CAPSULE | Refills: 1 | Status: SHIPPED | OUTPATIENT
Start: 2019-02-15 | End: 2019-02-28

## 2019-02-15 NOTE — PROGRESS NOTES
Subjective:       Patient ID: Jarrett Charlton Jr. is a 68 y.o. male.    Chief Complaint: Follow-up    HPI patient is here for follow-up for UTI developed after he had the prostate surgery was on Cipro for 10 days UA is today still show many white cell will treat with different antibiotic and send urine for culture clinically patient feel better does not have a dysuria anymore and do not see blood in the urine any more does have a low back pain but no fever chills nausea vomiting or diarrhea  Review of Systems    Objective:      Physical Exam   Constitutional: He is oriented to person, place, and time. He appears well-developed and well-nourished. No distress.   HENT:   Head: Normocephalic and atraumatic.   Right Ear: External ear normal.   Left Ear: External ear normal.   Nose: Nose normal.   Mouth/Throat: Oropharynx is clear and moist. No oropharyngeal exudate.   Eyes: Conjunctivae and EOM are normal. Pupils are equal, round, and reactive to light. Right eye exhibits no discharge. Left eye exhibits no discharge.   Neck: Normal range of motion. Neck supple. No thyromegaly present.   Cardiovascular: Normal rate, regular rhythm, normal heart sounds and intact distal pulses. Exam reveals no gallop and no friction rub.   No murmur heard.  Pulmonary/Chest: Effort normal and breath sounds normal. No respiratory distress. He has no wheezes. He has no rales. He exhibits no tenderness.   Abdominal: Soft. Bowel sounds are normal. He exhibits no distension. There is no tenderness. There is no rebound and no guarding.   Ileostomy functioning   Musculoskeletal: Normal range of motion. He exhibits no edema, tenderness or deformity.   Lymphadenopathy:     He has no cervical adenopathy.   Neurological: He is alert and oriented to person, place, and time.   Skin: Skin is warm and dry. Capillary refill takes less than 2 seconds. No rash noted. No erythema.   Psychiatric: He has a normal mood and affect. Judgment and thought content  normal.   Nursing note and vitals reviewed.      Assessment:       1. Acute cystitis with hematuria    2. Proteinuria, unspecified type    3. S/P TURP        Plan:       Acute cystitis with hematuria  -     Urine culture  -     nitrofurantoin, macrocrystal-monohydrate, (MACROBID) 100 MG capsule; Take 1 capsule (100 mg total) by mouth 2 (two) times daily.  Dispense: 20 capsule; Refill: 1    Proteinuria, unspecified type  -     POCT URINE DIPSTICK WITHOUT MICROSCOPE    S/P TURP  -     nitrofurantoin, macrocrystal-monohydrate, (MACROBID) 100 MG capsule; Take 1 capsule (100 mg total) by mouth 2 (two) times daily.  Dispense: 20 capsule; Refill: 1

## 2019-02-15 NOTE — TELEPHONE ENCOUNTER
----- Message from Kaye Donnelly sent at 2/15/2019  3:23 PM CST -----  Contact: Khloe with [employer]  Khloe just faxed over a prescription for the patient's ileostomy supplies and she is calling to see if the office received it.  Call Back#[phone] Ext [phone]  Thanks

## 2019-02-17 LAB — BACTERIA UR CULT: NO GROWTH

## 2019-02-18 ENCOUNTER — TELEPHONE (OUTPATIENT)
Dept: PRIMARY CARE CLINIC | Facility: CLINIC | Age: 69
End: 2019-02-18

## 2019-02-18 NOTE — TELEPHONE ENCOUNTER
----- Message from Shira Whitaker sent at 2/18/2019  9:05 AM CST -----  Type:  Test Results    Who Called Patient  Name of Test (Lab/Mammo/Etc):  Urine  Date of Test:  2/8/19  Ordering Provider:  same  [place]  [facility]  Best Call Back Number:  [phone]

## 2019-02-19 ENCOUNTER — TELEPHONE (OUTPATIENT)
Dept: PRIMARY CARE CLINIC | Facility: CLINIC | Age: 69
End: 2019-02-19

## 2019-02-19 NOTE — TELEPHONE ENCOUNTER
Patient states that when he urinates the burning is really bad would like to know what he should do. He is bringing in his sample on friday

## 2019-02-20 ENCOUNTER — OFFICE VISIT (OUTPATIENT)
Dept: PRIMARY CARE CLINIC | Facility: CLINIC | Age: 69
End: 2019-02-20
Payer: MEDICARE

## 2019-02-20 VITALS
SYSTOLIC BLOOD PRESSURE: 93 MMHG | OXYGEN SATURATION: 97 % | RESPIRATION RATE: 18 BRPM | HEART RATE: 90 BPM | TEMPERATURE: 98 F | HEIGHT: 74 IN | DIASTOLIC BLOOD PRESSURE: 62 MMHG | WEIGHT: 202.31 LBS | BODY MASS INDEX: 25.96 KG/M2

## 2019-02-20 DIAGNOSIS — N41.1 CHRONIC PROSTATITIS: ICD-10-CM

## 2019-02-20 DIAGNOSIS — I95.9 HYPOTENSION, UNSPECIFIED HYPOTENSION TYPE: ICD-10-CM

## 2019-02-20 DIAGNOSIS — N30.01 ACUTE CYSTITIS WITH HEMATURIA: Primary | ICD-10-CM

## 2019-02-20 LAB
BILIRUB SERPL-MCNC: ABNORMAL MG/DL
BLOOD URINE, POC: ABNORMAL
COLOR, POC UA: ABNORMAL
GLUCOSE UR QL STRIP: NEGATIVE
KETONES UR QL STRIP: NEGATIVE
LEUKOCYTE ESTERASE URINE, POC: ABNORMAL
NITRITE, POC UA: NEGATIVE
PH, POC UA: 5
PROTEIN, POC: ABNORMAL
SPECIFIC GRAVITY, POC UA: 1.01
UROBILINOGEN, POC UA: NEGATIVE

## 2019-02-20 PROCEDURE — 99999 PR PBB SHADOW E&M-EST. PATIENT-LVL IV: CPT | Mod: PBBFAC,,, | Performed by: INTERNAL MEDICINE

## 2019-02-20 PROCEDURE — 99999 PR PBB SHADOW E&M-EST. PATIENT-LVL IV: ICD-10-PCS | Mod: PBBFAC,,, | Performed by: INTERNAL MEDICINE

## 2019-02-20 PROCEDURE — 1101F PT FALLS ASSESS-DOCD LE1/YR: CPT | Mod: CPTII,S$GLB,, | Performed by: INTERNAL MEDICINE

## 2019-02-20 PROCEDURE — 99213 OFFICE O/P EST LOW 20 MIN: CPT | Mod: 25,S$GLB,, | Performed by: INTERNAL MEDICINE

## 2019-02-20 PROCEDURE — 81002 URINALYSIS NONAUTO W/O SCOPE: CPT | Mod: S$GLB,,, | Performed by: INTERNAL MEDICINE

## 2019-02-20 PROCEDURE — 3074F SYST BP LT 130 MM HG: CPT | Mod: CPTII,S$GLB,, | Performed by: INTERNAL MEDICINE

## 2019-02-20 PROCEDURE — 3078F PR MOST RECENT DIASTOLIC BLOOD PRESSURE < 80 MM HG: ICD-10-PCS | Mod: CPTII,S$GLB,, | Performed by: INTERNAL MEDICINE

## 2019-02-20 PROCEDURE — 3078F DIAST BP <80 MM HG: CPT | Mod: CPTII,S$GLB,, | Performed by: INTERNAL MEDICINE

## 2019-02-20 PROCEDURE — 81002 POCT URINE DIPSTICK WITHOUT MICROSCOPE: ICD-10-PCS | Mod: S$GLB,,, | Performed by: INTERNAL MEDICINE

## 2019-02-20 PROCEDURE — 1101F PR PT FALLS ASSESS DOC 0-1 FALLS W/OUT INJ PAST YR: ICD-10-PCS | Mod: CPTII,S$GLB,, | Performed by: INTERNAL MEDICINE

## 2019-02-20 PROCEDURE — 99213 PR OFFICE/OUTPT VISIT, EST, LEVL III, 20-29 MIN: ICD-10-PCS | Mod: 25,S$GLB,, | Performed by: INTERNAL MEDICINE

## 2019-02-20 PROCEDURE — 3074F PR MOST RECENT SYSTOLIC BLOOD PRESSURE < 130 MM HG: ICD-10-PCS | Mod: CPTII,S$GLB,, | Performed by: INTERNAL MEDICINE

## 2019-02-20 PROCEDURE — 87086 URINE CULTURE/COLONY COUNT: CPT

## 2019-02-20 RX ORDER — PREDNISONE 20 MG/1
20 TABLET ORAL 2 TIMES DAILY
Qty: 12 TABLET | Refills: 0 | Status: SHIPPED | OUTPATIENT
Start: 2019-02-20 | End: 2019-02-26

## 2019-02-20 RX ORDER — MINOCYCLINE HYDROCHLORIDE 100 MG/1
100 CAPSULE ORAL 2 TIMES DAILY
Qty: 30 CAPSULE | Refills: 1 | Status: SHIPPED | OUTPATIENT
Start: 2019-02-20 | End: 2019-04-08

## 2019-02-20 RX ORDER — MIDODRINE HYDROCHLORIDE 5 MG/1
5 TABLET ORAL 2 TIMES DAILY WITH MEALS
Qty: 30 TABLET | Refills: 0 | Status: SHIPPED | OUTPATIENT
Start: 2019-02-20 | End: 2019-04-08

## 2019-02-20 RX ORDER — PHENAZOPYRIDINE HYDROCHLORIDE 200 MG/1
200 TABLET, FILM COATED ORAL 2 TIMES DAILY PRN
Qty: 15 TABLET | Refills: 1 | Status: SHIPPED | OUTPATIENT
Start: 2019-02-20 | End: 2019-02-28 | Stop reason: SDUPTHER

## 2019-02-20 NOTE — PROGRESS NOTES
Subjective:       Patient ID: Jarrett Charlton Jr. is a 68 y.o. male.    Chief Complaint: Painful Urination    HPI patient is here complained of painful with urination and sometimes still have blood in a patient status post TURP procedure a couple weeks ago also fill pressure the in the rectum even though hysterectomy close after the colostomy for colon cancer a and also having lower back pain no nausea vomiting no fever chills review urine culture 4 days ago no growth patient had a course of treatment with Cipro then Omnicef without relief  Review of Systems    Objective:      Physical Exam   Constitutional: He is oriented to person, place, and time. He appears well-developed and well-nourished. No distress.   HENT:   Head: Normocephalic and atraumatic.   Right Ear: External ear normal.   Left Ear: External ear normal.   Nose: Nose normal.   Mouth/Throat: Oropharynx is clear and moist. No oropharyngeal exudate.   Eyes: Conjunctivae and EOM are normal. Pupils are equal, round, and reactive to light. Right eye exhibits no discharge. Left eye exhibits no discharge.   Neck: Normal range of motion. Neck supple. No thyromegaly present.   Cardiovascular: Normal rate, regular rhythm, normal heart sounds and intact distal pulses. Exam reveals no gallop and no friction rub.   No murmur heard.  Pulmonary/Chest: Effort normal and breath sounds normal. No respiratory distress. He has no wheezes. He has no rales. He exhibits no tenderness.   Abdominal: Soft. Bowel sounds are normal. He exhibits no distension. There is no tenderness. There is no rebound and no guarding.   Colostomy with soft stool   Musculoskeletal: Normal range of motion. He exhibits no edema, tenderness or deformity.   Lymphadenopathy:     He has no cervical adenopathy.   Neurological: He is alert and oriented to person, place, and time.   Skin: Skin is warm and dry. Capillary refill takes less than 2 seconds. No rash noted. No erythema.   Psychiatric: He has a  normal mood and affect. Judgment and thought content normal.   Nursing note and vitals reviewed.      Assessment:       1. Acute cystitis with hematuria    2. Chronic prostatitis    3. Hypotension, unspecified hypotension type        Plan:       Acute cystitis with hematuria  -     minocycline (MINOCIN,DYNACIN) 100 MG capsule; Take 1 capsule (100 mg total) by mouth 2 (two) times daily.  Dispense: 30 capsule; Refill: 1  -     phenazopyridine (PYRIDIUM) 200 MG tablet; Take 1 tablet (200 mg total) by mouth 2 (two) times daily as needed for Pain.  Dispense: 15 tablet; Refill: 1  -     POCT URINE DIPSTICK WITHOUT MICROSCOPE  -     Urine culture    Chronic prostatitis  -     minocycline (MINOCIN,DYNACIN) 100 MG capsule; Take 1 capsule (100 mg total) by mouth 2 (two) times daily.  Dispense: 30 capsule; Refill: 1  -     predniSONE (DELTASONE) 20 MG tablet; Take 1 tablet (20 mg total) by mouth 2 (two) times daily. for 6 days  Dispense: 12 tablet; Refill: 0  -     Urine culture    Hypotension, unspecified hypotension type  Comments:  Hypotension which happen sometime on minute drain at home but running out O blood pressure med for couple of days  Orders:  -     midodrine (PROAMATINE) 5 MG Tab; Take 1 tablet (5 mg total) by mouth 2 (two) times daily with meals.  Dispense: 30 tablet; Refill: 0

## 2019-02-21 ENCOUNTER — TELEPHONE (OUTPATIENT)
Dept: UROLOGY | Facility: CLINIC | Age: 69
End: 2019-02-21

## 2019-02-21 DIAGNOSIS — N41.9 PROSTATITIS, UNSPECIFIED PROSTATITIS TYPE: Primary | ICD-10-CM

## 2019-02-21 LAB — BACTERIA UR CULT: NORMAL

## 2019-02-21 RX ORDER — CIPROFLOXACIN 500 MG/1
500 TABLET ORAL 2 TIMES DAILY
Qty: 30 TABLET | Refills: 1 | Status: SHIPPED | OUTPATIENT
Start: 2019-02-21 | End: 2019-02-28

## 2019-02-21 NOTE — TELEPHONE ENCOUNTER
----- Message from Yajaira Joaquin MA sent at 2/21/2019  9:02 AM CST -----  Contact: [phone]  Needs Advice    Reason for call: pt said he went to went to his PCP because he had symptoms of a UTI , he was told he has a bad infection with a high WBC and to contact Dr Mota since he had a recent prostate procedure.         Communication Preference: [phone]     Additional Information: please advise

## 2019-02-21 NOTE — TELEPHONE ENCOUNTER
Had TURP 1/23, pt started with dysuria and rectal pain last week-saw his pcp on 2/15 and was given macrobid and pyridium . Urine culture was negative. States he did another urine yesterday because he is still with symptoms-urine culture is pending

## 2019-02-21 NOTE — PATIENT INSTRUCTIONS
Return to clinic in 2 day for blood pressure check  One week for UA  Patient will try to get appointment sooner with his urologist had 1 2 weeks from now

## 2019-02-22 DIAGNOSIS — E11.9 TYPE 2 DIABETES MELLITUS WITHOUT COMPLICATION: ICD-10-CM

## 2019-02-25 ENCOUNTER — TELEPHONE (OUTPATIENT)
Dept: NEPHROLOGY | Facility: CLINIC | Age: 69
End: 2019-02-25

## 2019-02-25 NOTE — PROGRESS NOTES
"CRS Office Visit Followup    Referring Md:   No referring provider defined for this encounter.    SUBJECTIVE:     Chief Complaint: ileostomy problems    History of Present Illness:  The patient is an established patient to this practice.   Course is as follows:  Patient is a 68 y.o. male presents with ileostomy dysfunction  History of ulcerative colitis s/p total proctocolectomy with end ileostomy (1985)  1/29/19: He has been doing well for multiple years.  Over the past 6-8 years, he has had intermittent episodes of dehydration and syncope.  This is 2-3 times per year.  He empties his bag 6-7 times per day.  Changes is pouch every 3-4 days.  No issues with his perineal incision. Overall feels well and is active.  Syncopal episodes require ED transfer and frequent hospital admission.    - started on imodium    2/26/2019: Has taken imodium intermittently when stoma output is high but has not taken it scheduled. Thinks the imodium cuts his output about in half but has not taken exact measurements. Still empties his bag 6-8 times a day, he estimates about 500cc per empty. Had an episode of dehydration/near syncope earlier in the month. Also notes intermittent episodes of being "stopped up" associated with RLQ pain lasting up to 24h. He treats this by drinking large volumes of Pedialyte which eventually return of stoma output. Currently feels well, no lightheadedness or dizziness at this time.        Review of Systems:  Review of Systems   Constitutional: Positive for malaise/fatigue. Negative for chills, diaphoresis, fever and weight loss.   HENT: Negative for congestion.    Respiratory: Negative for shortness of breath.    Cardiovascular: Negative for chest pain and leg swelling.   Gastrointestinal: Positive for diarrhea. Negative for abdominal pain, blood in stool, constipation, nausea and vomiting.   Genitourinary: Negative for dysuria.   Musculoskeletal: Negative for back pain and myalgias.   Skin: Negative for " "rash.   Neurological: Positive for sensory change. Negative for dizziness and weakness.   Endo/Heme/Allergies: Does not bruise/bleed easily.   Psychiatric/Behavioral: Negative for depression.       OBJECTIVE:     Vital Signs (Most Recent)  BP (!) 156/93 (BP Location: Right arm, Patient Position: Sitting, BP Method: Large (Automatic))   Pulse 74   Ht 6' 2" (1.88 m)   Wt 91.2 kg (201 lb)   BMI 25.81 kg/m²     Physical Exam:  General: White male in no distress   Neuro: alert and oriented x 4.  Moves all extremities.     HEENT: no icterus.  Trachea midline  Respiratory: respirations are even and unlabored  Cardiac: regular rate  Abdomen:  Midline laparotomy is well-healed.  Prior ileostomy site in the left upper quadrant is also well healed.  Ileostomy currently sits in the right upper quadrant is pink protrudes above the level of the skin.  Extremities: Warm dry and intact  Skin: no rashes  Anorectal:  Deferred    Labs: H/H = 14/42.  Cr = 1.6    Imaging: CT from 1/6/19 personally reviewed.  Small peristomal hernia.  No other pathology seen.       ASSESSMENT/PLAN:     Diagnoses and all orders for this visit:    High output ileostomy        68-year-old gentleman with ulcerative colitis status post total procto colectomy and end ileostomy in 1985 presents with intermittent dehydration and syncopal episodes secondary to high-output ileostomy.     Continue to take imodium as needed  Start citrucel daily  Pedialyte as needed  RTC PRN    CED Haley MD  Staff Surgeon  Colon & Rectal Surgery    "

## 2019-02-25 NOTE — TELEPHONE ENCOUNTER
I called pt to inform him I link his blood work order to the 12. Pt was aware of it and didn't have no further question  ----- Message from Trish Parks sent at 2/25/2019 10:11 AM CST -----  Contact: Jo Tel:   [phone]    Due to see  You on 3/19th.   Having labs on 3/12.     Will be going to [facility].    Pls send the paperwork from Dr. Lyon so he won't have to be stuck twice.

## 2019-02-26 ENCOUNTER — OFFICE VISIT (OUTPATIENT)
Dept: SURGERY | Facility: CLINIC | Age: 69
End: 2019-02-26
Payer: MEDICARE

## 2019-02-26 VITALS
HEART RATE: 74 BPM | BODY MASS INDEX: 25.8 KG/M2 | HEIGHT: 74 IN | DIASTOLIC BLOOD PRESSURE: 93 MMHG | WEIGHT: 201 LBS | SYSTOLIC BLOOD PRESSURE: 156 MMHG

## 2019-02-26 DIAGNOSIS — R19.8 HIGH OUTPUT ILEOSTOMY: Primary | ICD-10-CM

## 2019-02-26 DIAGNOSIS — Z93.2 HIGH OUTPUT ILEOSTOMY: Primary | ICD-10-CM

## 2019-02-26 PROCEDURE — 3080F DIAST BP >= 90 MM HG: CPT | Mod: CPTII,S$GLB,, | Performed by: COLON & RECTAL SURGERY

## 2019-02-26 PROCEDURE — 99213 PR OFFICE/OUTPT VISIT, EST, LEVL III, 20-29 MIN: ICD-10-PCS | Mod: S$GLB,,, | Performed by: COLON & RECTAL SURGERY

## 2019-02-26 PROCEDURE — 3080F PR MOST RECENT DIASTOLIC BLOOD PRESSURE >= 90 MM HG: ICD-10-PCS | Mod: CPTII,S$GLB,, | Performed by: COLON & RECTAL SURGERY

## 2019-02-26 PROCEDURE — 1101F PT FALLS ASSESS-DOCD LE1/YR: CPT | Mod: CPTII,S$GLB,, | Performed by: COLON & RECTAL SURGERY

## 2019-02-26 PROCEDURE — 99213 OFFICE O/P EST LOW 20 MIN: CPT | Mod: S$GLB,,, | Performed by: COLON & RECTAL SURGERY

## 2019-02-26 PROCEDURE — 99999 PR PBB SHADOW E&M-EST. PATIENT-LVL III: CPT | Mod: PBBFAC,,, | Performed by: COLON & RECTAL SURGERY

## 2019-02-26 PROCEDURE — 3077F SYST BP >= 140 MM HG: CPT | Mod: CPTII,S$GLB,, | Performed by: COLON & RECTAL SURGERY

## 2019-02-26 PROCEDURE — 99999 PR PBB SHADOW E&M-EST. PATIENT-LVL III: ICD-10-PCS | Mod: PBBFAC,,, | Performed by: COLON & RECTAL SURGERY

## 2019-02-26 PROCEDURE — 3077F PR MOST RECENT SYSTOLIC BLOOD PRESSURE >= 140 MM HG: ICD-10-PCS | Mod: CPTII,S$GLB,, | Performed by: COLON & RECTAL SURGERY

## 2019-02-26 PROCEDURE — 1101F PR PT FALLS ASSESS DOC 0-1 FALLS W/OUT INJ PAST YR: ICD-10-PCS | Mod: CPTII,S$GLB,, | Performed by: COLON & RECTAL SURGERY

## 2019-02-26 NOTE — PATIENT INSTRUCTIONS
Today we discussed medications to help with ileostomy output.    Take citrucel (either 1 tablespoon of powder OR two caplets) every morning.    Take imodium as needed for high ileostomy output.    Continue to take Pedialyte as needed.    If this is not helping you can follow up and we can make adjustments to these medications.

## 2019-02-28 ENCOUNTER — TELEPHONE (OUTPATIENT)
Dept: UROLOGY | Facility: CLINIC | Age: 69
End: 2019-02-28

## 2019-02-28 ENCOUNTER — OFFICE VISIT (OUTPATIENT)
Dept: PRIMARY CARE CLINIC | Facility: CLINIC | Age: 69
End: 2019-02-28
Payer: MEDICARE

## 2019-02-28 VITALS
HEART RATE: 68 BPM | HEIGHT: 74 IN | OXYGEN SATURATION: 97 % | WEIGHT: 201 LBS | SYSTOLIC BLOOD PRESSURE: 113 MMHG | DIASTOLIC BLOOD PRESSURE: 70 MMHG | BODY MASS INDEX: 25.8 KG/M2 | RESPIRATION RATE: 18 BRPM

## 2019-02-28 DIAGNOSIS — Z95.828 PRESENCE OF ARTERIAL STENT: ICD-10-CM

## 2019-02-28 DIAGNOSIS — I10 ESSENTIAL HYPERTENSION: ICD-10-CM

## 2019-02-28 DIAGNOSIS — N18.30 CHRONIC RENAL IMPAIRMENT, STAGE 3 (MODERATE): ICD-10-CM

## 2019-02-28 DIAGNOSIS — E11.9 TYPE 2 DIABETES MELLITUS WITHOUT COMPLICATION, WITHOUT LONG-TERM CURRENT USE OF INSULIN: ICD-10-CM

## 2019-02-28 DIAGNOSIS — N30.01 ACUTE CYSTITIS WITH HEMATURIA: ICD-10-CM

## 2019-02-28 DIAGNOSIS — K21.9 GASTROESOPHAGEAL REFLUX DISEASE, ESOPHAGITIS PRESENCE NOT SPECIFIED: ICD-10-CM

## 2019-02-28 DIAGNOSIS — R35.0 FREQUENCY OF MICTURITION: Primary | ICD-10-CM

## 2019-02-28 DIAGNOSIS — I25.10 CORONARY ARTERY DISEASE, ANGINA PRESENCE UNSPECIFIED, UNSPECIFIED VESSEL OR LESION TYPE, UNSPECIFIED WHETHER NATIVE OR TRANSPLANTED HEART: ICD-10-CM

## 2019-02-28 DIAGNOSIS — Z90.79 S/P TURP: ICD-10-CM

## 2019-02-28 DIAGNOSIS — E78.5 HYPERLIPIDEMIA, UNSPECIFIED HYPERLIPIDEMIA TYPE: ICD-10-CM

## 2019-02-28 DIAGNOSIS — K62.89 RECTAL PAIN: ICD-10-CM

## 2019-02-28 DIAGNOSIS — R30.0 DYSURIA: Primary | ICD-10-CM

## 2019-02-28 DIAGNOSIS — B37.0 THRUSH: ICD-10-CM

## 2019-02-28 PROCEDURE — 3078F PR MOST RECENT DIASTOLIC BLOOD PRESSURE < 80 MM HG: ICD-10-PCS | Mod: CPTII,S$GLB,, | Performed by: FAMILY MEDICINE

## 2019-02-28 PROCEDURE — 3078F DIAST BP <80 MM HG: CPT | Mod: CPTII,S$GLB,, | Performed by: FAMILY MEDICINE

## 2019-02-28 PROCEDURE — 99999 PR PBB SHADOW E&M-EST. PATIENT-LVL III: CPT | Mod: PBBFAC,,, | Performed by: FAMILY MEDICINE

## 2019-02-28 PROCEDURE — 3044F HG A1C LEVEL LT 7.0%: CPT | Mod: CPTII,S$GLB,, | Performed by: FAMILY MEDICINE

## 2019-02-28 PROCEDURE — 99999 PR PBB SHADOW E&M-EST. PATIENT-LVL III: ICD-10-PCS | Mod: PBBFAC,,, | Performed by: FAMILY MEDICINE

## 2019-02-28 PROCEDURE — 99214 OFFICE O/P EST MOD 30 MIN: CPT | Mod: S$GLB,,, | Performed by: FAMILY MEDICINE

## 2019-02-28 PROCEDURE — 99214 PR OFFICE/OUTPT VISIT, EST, LEVL IV, 30-39 MIN: ICD-10-PCS | Mod: S$GLB,,, | Performed by: FAMILY MEDICINE

## 2019-02-28 PROCEDURE — 3074F PR MOST RECENT SYSTOLIC BLOOD PRESSURE < 130 MM HG: ICD-10-PCS | Mod: CPTII,S$GLB,, | Performed by: FAMILY MEDICINE

## 2019-02-28 PROCEDURE — 1101F PT FALLS ASSESS-DOCD LE1/YR: CPT | Mod: CPTII,S$GLB,, | Performed by: FAMILY MEDICINE

## 2019-02-28 PROCEDURE — 1101F PR PT FALLS ASSESS DOC 0-1 FALLS W/OUT INJ PAST YR: ICD-10-PCS | Mod: CPTII,S$GLB,, | Performed by: FAMILY MEDICINE

## 2019-02-28 PROCEDURE — 3074F SYST BP LT 130 MM HG: CPT | Mod: CPTII,S$GLB,, | Performed by: FAMILY MEDICINE

## 2019-02-28 PROCEDURE — 3044F PR MOST RECENT HEMOGLOBIN A1C LEVEL <7.0%: ICD-10-PCS | Mod: CPTII,S$GLB,, | Performed by: FAMILY MEDICINE

## 2019-02-28 RX ORDER — PHENAZOPYRIDINE HYDROCHLORIDE 200 MG/1
200 TABLET, FILM COATED ORAL 2 TIMES DAILY PRN
Qty: 15 TABLET | Refills: 1 | Status: SHIPPED | OUTPATIENT
Start: 2019-02-28 | End: 2019-04-08

## 2019-02-28 RX ORDER — LIDOCAINE HYDROCHLORIDE 20 MG/ML
JELLY TOPICAL ONCE
Status: CANCELLED | OUTPATIENT
Start: 2019-02-28 | End: 2019-02-28

## 2019-02-28 RX ORDER — CIPROFLOXACIN 500 MG/1
500 TABLET ORAL 2 TIMES DAILY
Qty: 14 TABLET | Refills: 0 | Status: SHIPPED | OUTPATIENT
Start: 2019-02-28 | End: 2019-03-07

## 2019-02-28 RX ORDER — NYSTATIN 100000 [USP'U]/ML
5 SUSPENSION ORAL 4 TIMES DAILY
Qty: 200 ML | Refills: 0 | Status: SHIPPED | OUTPATIENT
Start: 2019-02-28 | End: 2019-03-10

## 2019-02-28 RX ORDER — HYDROCODONE BITARTRATE AND ACETAMINOPHEN 5; 325 MG/1; MG/1
1 TABLET ORAL EVERY 6 HOURS PRN
Qty: 30 TABLET | Refills: 0 | Status: SHIPPED | OUTPATIENT
Start: 2019-02-28 | End: 2019-04-08

## 2019-02-28 RX ORDER — CIPROFLOXACIN 250 MG/1
500 TABLET, FILM COATED ORAL ONCE
Status: CANCELLED | OUTPATIENT
Start: 2019-02-28 | End: 2019-02-28

## 2019-02-28 NOTE — PROGRESS NOTES
Subjective:       Patient ID: Jarrett Charlton Jr. is a 68 y.o. male.    Chief Complaint: Follow-up (UTI)    HPI:  68-year-old white male in for history UTI--states not better.  Patient was seen by  urologist at [facility] [address]-- did MRI pelvis and found dark spot on prostate--did cystoscopy-- did biopsy penis--told everything was negative. Saw Dr Gonzalez 2 weeks ago told UTI txed with  minocycline and nitrofurantoin---patient states was not getting better call  and he called in Cipro last week. Pt seems like wants pee all the time--as urinate gets burning hard pain rectum-- throbs. Rectum in pain throbbing. Yesterday BP was 164/104 finally got it down took a second toprol 12.5 or 1/2of a 25 so took 2 of them .        Has been in hospital several times for hypotension-- dehydrated and BP low does OK usually with toprol 12.5 qd . Has appt Dr Dao  Next week Lab 3-12-19 Appt 3-18-19. 2 days later sees kidney doctor Dr Smith.     ROS:  Skin: no psoriasis, eczema, skin cancer history of actinic keratosis removed-sun spot and some squamous cell cancers removed  HEENT: No headache, ocular pain, blurred vision, diplopia, epistaxis, hoarseness change in voice, thyroid trouble  Lung: No pneumonia, asthma, Tb, wheezing, SOB, no smoking  Heart: No chest pain, ankle edema, palpitations, MI, kemal murmur,+ hypertension,+ hyperlipidemia, CAD with stent x 1 history of an  aneurysm-4 cm in chest   Abdomen: No nausea, vomiting, diarrhea, constipation, ulcers, hepatitis, gallbladder disease, melena, hematochezia, hematemesis has ileostomy entire large intestine removed secondary to ulcer of colitis +GERD history abnormal liver functions in the past  : no UTI, renal disease, stones history chronic renal insufficiency/BPH/elevated PSA/status post TURP  MS: no fractures, lupus, rheumatoid, +gout +osteoarthritis DDD lumbar spine  Neuro: + dizziness,+ LOC--several times with dehydration and hypotension,no   seizures   + diabetes last hemoglobin A1c 6 glucose 125 this a.m., no anemia, no anxiety, no depression   , one child, 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 Frequency of micturition    2. Rectal pain    3. Thrush    4. Type 2 diabetes mellitus without complication, without long-term current use of insulin    5. Essential hypertension    6. Hyperlipidemia, unspecified hyperlipidemia type    7. Coronary artery disease, angina presence unspecified, unspecified vessel or lesion type, unspecified whether native or transplanted heart    8. Presence of arterial stent    9. Chronic renal impairment, stage 3 (moderate)    10. S/P TURP    11. Gastroesophageal reflux disease, esophagitis presence not specified        Plan:       Frequency of micturition    Rectal pain    Thrush    Type 2 diabetes mellitus without complication, without long-term current use of insulin    Essential hypertension    Hyperlipidemia, unspecified hyperlipidemia type    Coronary artery disease, angina presence unspecified, unspecified vessel or lesion type, unspecified whether native or transplanted heart    Presence of arterial stent    Chronic renal impairment, stage 3 (moderate)    S/P TURP    Gastroesophageal reflux disease, esophagitis presence not specified      Call DR Mota pt still with frequency, rectal pain with urinating-- Blood urine cultures neg -- Txed with  minocycline nitrofurantoin by Dr. Gonzalez, Cipro byDr Mota.  If he agrees treat with Bactrim DS 1 p.o. b.i.d. creatinine 1.5 glomerular filtration rate 46-54--need an appointment soon to re-evaluate source of frequency and rectal pain--patient has an ileostomy secondary to colectomy secondary to also colitis in the past.   Pyridium 100 tid   Has appt cardiology 3-18-19 and lab 13-12-19   Nystatin 1 tsp q.6 hours  Urinalysis--some areas of the slide WBC 6-10 but another area where too numerous count WBCs--RBC 10-20 but another area too numerous to count--small bacteria  Urine culture blood cultures show no growth had stool for C difficile salmonella shigella all negative--patient with history of an ileostomy secondary to ulcerative colitis  Spoke with DR Mota Preferred continue Cipro 500 mg b.i.d. x7 day--continue pyridium 200 tid, Dr Mota call patient after Jeanlucia Villaseñor --for another colonoscopy--unable to check prostate due to rectum being closed with prior ileostomy--felt WBCs and RBCs in urine is probably secondary inflammation in not infection   Patient wants some medication for pain OK Norco 1/2-1 po q.6 hours p.r.n. Pain #30 Doubt patient needs another culture

## 2019-03-01 NOTE — TELEPHONE ENCOUNTER
Pt states he can not do the cystoscope in 2 weeks due to alfred'ts that he needs to take his wife to. He requested to reschedule to 3/28/2019

## 2019-03-19 ENCOUNTER — OFFICE VISIT (OUTPATIENT)
Dept: NEPHROLOGY | Facility: CLINIC | Age: 69
End: 2019-03-19
Payer: MEDICARE

## 2019-03-19 ENCOUNTER — TELEPHONE (OUTPATIENT)
Dept: NEPHROLOGY | Facility: CLINIC | Age: 69
End: 2019-03-19

## 2019-03-19 VITALS
WEIGHT: 197.75 LBS | BODY MASS INDEX: 25.38 KG/M2 | HEART RATE: 73 BPM | SYSTOLIC BLOOD PRESSURE: 124 MMHG | OXYGEN SATURATION: 100 % | HEIGHT: 74 IN | DIASTOLIC BLOOD PRESSURE: 84 MMHG

## 2019-03-19 DIAGNOSIS — I10 ESSENTIAL HYPERTENSION: ICD-10-CM

## 2019-03-19 DIAGNOSIS — N18.30 CKD (CHRONIC KIDNEY DISEASE) STAGE 3, GFR 30-59 ML/MIN: Primary | ICD-10-CM

## 2019-03-19 PROCEDURE — 3079F DIAST BP 80-89 MM HG: CPT | Mod: CPTII,S$GLB,, | Performed by: INTERNAL MEDICINE

## 2019-03-19 PROCEDURE — 99999 PR PBB SHADOW E&M-EST. PATIENT-LVL III: ICD-10-PCS | Mod: PBBFAC,,, | Performed by: INTERNAL MEDICINE

## 2019-03-19 PROCEDURE — 3079F PR MOST RECENT DIASTOLIC BLOOD PRESSURE 80-89 MM HG: ICD-10-PCS | Mod: CPTII,S$GLB,, | Performed by: INTERNAL MEDICINE

## 2019-03-19 PROCEDURE — 99215 PR OFFICE/OUTPT VISIT, EST, LEVL V, 40-54 MIN: ICD-10-PCS | Mod: S$GLB,,, | Performed by: INTERNAL MEDICINE

## 2019-03-19 PROCEDURE — 99999 PR PBB SHADOW E&M-EST. PATIENT-LVL III: CPT | Mod: PBBFAC,,, | Performed by: INTERNAL MEDICINE

## 2019-03-19 PROCEDURE — 3074F SYST BP LT 130 MM HG: CPT | Mod: CPTII,S$GLB,, | Performed by: INTERNAL MEDICINE

## 2019-03-19 PROCEDURE — 99215 OFFICE O/P EST HI 40 MIN: CPT | Mod: S$GLB,,, | Performed by: INTERNAL MEDICINE

## 2019-03-19 PROCEDURE — 3074F PR MOST RECENT SYSTOLIC BLOOD PRESSURE < 130 MM HG: ICD-10-PCS | Mod: CPTII,S$GLB,, | Performed by: INTERNAL MEDICINE

## 2019-03-19 PROCEDURE — 1101F PR PT FALLS ASSESS DOC 0-1 FALLS W/OUT INJ PAST YR: ICD-10-PCS | Mod: CPTII,S$GLB,, | Performed by: INTERNAL MEDICINE

## 2019-03-19 PROCEDURE — 1101F PT FALLS ASSESS-DOCD LE1/YR: CPT | Mod: CPTII,S$GLB,, | Performed by: INTERNAL MEDICINE

## 2019-03-19 NOTE — TELEPHONE ENCOUNTER
Hello.  He can be off the lisinopril.  I discussed it with him.  His bp's are much better in the 120's now and likely won't need midodrine. Please see my progress note from today for further details.

## 2019-03-19 NOTE — PROGRESS NOTES
"Subjective:       Patient ID: Jarrett Charlton Jr. is a 68 y.o. White male who presents for follow-up evaluation of No chief complaint on file.    HPI This is a 68-year-old white male with longstanding hypertension, anemia, and chronic kidney disease is coming in for followup of these. DM labile per pt with a1c of 6+. Bp's  Low at home in the 80's-100's and has several "syncope" and has been seeing cards.  They decreased the lisinopril from 5 mg bid to 2.5 mg bid but still had lows and since last week, he has not had lisinopril and is doing much better with BP's in the 1 teens to 120's and denies dizziness. No proteinuria and anemia stable. Denies NSAID's. Recently had prostate surgery.    PAST MEDICAL HISTORY: Hypertension for 5 years,recently diag diabetes, gout, ulcerative colitis with ostomy and hyperlipidemia.     Review of Systems   Constitutional: Positive for fatigue. Negative for appetite change.   Eyes: Negative for discharge.   Respiratory: Negative for cough, shortness of breath and wheezing.    Cardiovascular: Negative for chest pain and palpitations.   Gastrointestinal: Negative for abdominal pain, diarrhea, nausea and vomiting.   Genitourinary: Negative for dysuria, frequency, hematuria and urgency.        Blood clots in urine since recent surgery-seeing urology.   Skin: Negative for color change and rash.   Psychiatric/Behavioral: Negative for confusion.   All other systems reviewed and are negative.      Objective:      Physical Exam   Constitutional: He is oriented to person, place, and time. He appears well-developed and well-nourished.   HENT:   Right Ear: External ear normal.   Left Ear: External ear normal.   Nose: Nose normal.   Mouth/Throat: Normal dentition.   Eyes: Conjunctivae, EOM and lids are normal. Pupils are equal, round, and reactive to light.   Neck: Trachea normal. No thyroid mass present.   Cardiovascular: Normal rate and regular rhythm. Exam reveals no friction rub.   No murmur " heard.  No lower extremity edema   Pulmonary/Chest: Effort normal and breath sounds normal. No respiratory distress. He has no decreased breath sounds. He has no rales.   Abdominal: Soft. Bowel sounds are normal. He exhibits no mass. There is no tenderness. There is no rebound. No hernia.   Ostomy bag inplace.   Musculoskeletal: He exhibits no edema.   Neurological: He is alert and oriented to person, place, and time.   Skin: Skin is warm and dry. No rash noted. No erythema.   Psychiatric: He has a normal mood and affect. Judgment normal. His mood appears not anxious. He does not exhibit a depressed mood.   Oriented to time, place and person.   Vitals reviewed.      Assessment:       No diagnosis found.    Plan:           This is a 68 year-old white male with hypertension,CKD,DM,   ulcerative colitis who is coming in for followup.     PROBLEMS:   1. 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 His baseline   creatinines use to be anywhere from 1.5 to 1.7 with average in the 1.3  range with the most recent creatinine being 1.3 with MDRD GFR of 55 ml/min . His CKD is secondary to age-related nephron loss, HTN along with NSAID use in the past for his gout and dehydration with high ostomy output. Off NSAID's. Hydrate well especially when he has more ostomy output and is on lomotil which is helping.  2. Hypertension. Bp's stable now off lisnopril.   Monitor bp's closely. Asked pt to hold lisinopril to help with bp's. No proteinuria.   3. Anemia. H&H is stable.   4. Renal osteodystrophy. His intact PTH along with ca/phos is stable.   On vit d OTC.  5. Proteinuria. He has no sig proteinuria.  Off  Lisinopril with low bp's. We will see the patient back again in 6 Months with rfp, i pth, urine creatinine and urine protein.   Discussed protonix side effects-he will discuss with pcp.

## 2019-03-19 NOTE — TELEPHONE ENCOUNTER
----- Message from Joanna Mays NP sent at 3/18/2019  2:37 PM CDT -----  We provide care to Mr Farias. We adjusted Lisinopril to 2.5 mg po bid due to symptomatic orthostasis. Despite adjusting medication dosing, he continues to become hypotensive and syncopal. We held the medication yesterday due to Cr 1.44  And K 5.4. Bp has normalized and dizziness improved. If you feel he needs to continue Lisinopril, then we can start him on Midodrine.   Thanks   Joanna SAUNDERS   [phone]

## 2019-03-20 ENCOUNTER — TELEPHONE (OUTPATIENT)
Dept: NEPHROLOGY | Facility: CLINIC | Age: 69
End: 2019-03-20

## 2019-03-20 NOTE — PROGRESS NOTES
History of present illness: 68-year-old gentleman with ulcerative colitis.  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  He remains on allopurinol 450 mg daily.  His uric acid level was slightly elevated last visit but I kept him on the same dose of allopurinol because he had not had a recent gout attack.  He stopped taking colchicine 2 months ago because of the cost of the medication.  He still does have some colchicine at home.  He is doing worse for the past 3 weeks.  He started with pain and swelling in his left elbow.  He then developed problems in the right thumb and the left great toe.  The pain was of gradual onset.  There was some redness and increased warmth.  This was similar to his prior gout attacks.  He took a Medrol Dosepak which helped but he still has some residual pain.  He had been having no pain up until that time.  He did have coronary stents since his last visit.  His diabetes has been stable.    Physical examination:  Musculoskeletal: He has enlargement of the left olecranon bursa but has no definite tophus.  He does have some tenderness to palpation.  There is no erythema or increased warmth.  He has bony hypertrophy of the PIPs and DIPs.  He has no synovitis in the hands.  He has bony hypertrophy of the knees with pain on range of motion.  He has no effusion.  He has bony hypertrophy of the feet.    Assessment: He may be having a resolving gout attack    Plans:  1.  Laboratory studies are obtained  2.  Continue allopurinol 4-50 mg daily at the present time  3.  Resume colchicine 0.6 mg daily.  I sent in a prescription for the colchicine capsules to see if it is covered better than the tablets  4.  Keep previous appointment    [General Appearance - Alert] : alert [General Appearance - In No Acute Distress] : in no acute distress [General Appearance - Well Nourished] : well nourished [Sclera] : the sclera and conjunctiva were normal [Outer Ear] : the ears and nose were normal in appearance [Neck Appearance] : the appearance of the neck was normal [Respiration, Rhythm And Depth] : normal respiratory rhythm and effort [Exaggerated Use Of Accessory Muscles For Inspiration] : no accessory muscle use [Auscultation Breath Sounds / Voice Sounds] : lungs were clear to auscultation bilaterally [Apical Impulse] : the apical impulse was normal [Heart Rate And Rhythm] : heart rate was normal and rhythm regular [Heart Sounds] : normal S1 and S2 [Bowel Sounds] : normal bowel sounds [Abdomen Soft] : soft [Abdomen Tenderness] : non-tender [Skin Color & Pigmentation] : normal skin color and pigmentation [Skin Turgor] : normal skin turgor [] : no rash [Oriented To Time, Place, And Person] : oriented to person, place, and time [Impaired Insight] : insight and judgment were intact [Affect] : the affect was normal

## 2019-03-20 NOTE — TELEPHONE ENCOUNTER
Trace amount of protein in the urine from diabetes-will monitor and possibly restart lisinopril in the future to help with the urine protein if his bp's tolertate.  Please inform the pt.

## 2019-04-05 ENCOUNTER — OFFICE VISIT (OUTPATIENT)
Dept: UROLOGY | Facility: CLINIC | Age: 69
End: 2019-04-05
Payer: MEDICARE

## 2019-04-05 VITALS
HEIGHT: 74 IN | DIASTOLIC BLOOD PRESSURE: 94 MMHG | BODY MASS INDEX: 25.03 KG/M2 | WEIGHT: 195 LBS | HEART RATE: 77 BPM | SYSTOLIC BLOOD PRESSURE: 142 MMHG

## 2019-04-05 DIAGNOSIS — R97.20 ELEVATED PSA: ICD-10-CM

## 2019-04-05 DIAGNOSIS — N40.0 BENIGN PROSTATIC HYPERPLASIA WITHOUT LOWER URINARY TRACT SYMPTOMS: Primary | ICD-10-CM

## 2019-04-05 PROCEDURE — 99499 NO LOS: ICD-10-PCS | Mod: S$GLB,,, | Performed by: UROLOGY

## 2019-04-05 PROCEDURE — 99499 UNLISTED E&M SERVICE: CPT | Mod: S$GLB,,, | Performed by: UROLOGY

## 2019-04-05 PROCEDURE — 99999 PR PBB SHADOW E&M-EST. PATIENT-LVL IV: ICD-10-PCS | Mod: PBBFAC,,, | Performed by: UROLOGY

## 2019-04-05 PROCEDURE — 99999 PR PBB SHADOW E&M-EST. PATIENT-LVL IV: CPT | Mod: PBBFAC,,, | Performed by: UROLOGY

## 2019-04-05 NOTE — PROGRESS NOTES
CC:  Elevated PSA, LUTS    Jarrett Charlton Jr. is a 69 y.o. man who is here for the evaluation of difficult urination.  Hx of no rectum as well as concerning PIRADS lesion on MRI, plan for TURP for biopsying suspicious area        Date: 01/23/2019  Procedure(s) Performed:   TURP  Findings:   Open bladder neck some regrowth of adenoma and suspicious area in right mid prostate   Right side of prostate resected and sent for pathology    Since TURP, he noted that he can void better with better urine flow.  Blood in urine resolved.  Occasional ZEB but is getting better.    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  He was not interested in SUDS.  S/p colon surgery and his anus is closed.  Therefore dong TRUS bx of prostate is not feasible.  After discussion, we decided to try finasteride to see whether it will lower his PSA.   He has been on finasteride for the past couple of years.    When he was considered to undergo TURP, we could not do his surgery because he was not able to stop ASA or Plavix due to fresh vascular stent.  Now he got a clearance that he can hold off plavix and ASA.  He is here to discuss his LUTS and elevated PSA.    hx of straining to urinate but not bothered by it.  Even then his urine flow is not strong.  He tried flomax for the past 2 months without significant improvement.  Does feel that he is emptying his bladder completely.  Hx of ulcerative colitis, had colon surgery back in 1985 and his anus was closed.  Has a neuropathy on the right leg.  Hx of sciatic nerve on both sides and received injection on the back.  Hx of diabetes diagnosed 2 years ago.  No family hx of prostate cancer.  He is a .  Denies flank pain, dysuira, hematuria .      Past Medical History:   Diagnosis Date    Basal cell carcinoma     BPH (benign prostatic hyperplasia)     s/p TURP    Carotid stenosis     Chronic  kidney disease     CKD (chronic kidney disease)     Claudication     DDD (degenerative disc disease) 10/21/2013    Diabetes mellitus     Diabetes mellitus with renal complications     Disc disease, degenerative, cervical     GERD (gastroesophageal reflux disease)     Gout, chronic     History of ulcerative colitis     s/p colectomy and ileostomy    HLD (hyperlipidemia)     HTN (hypertension)     Ileostomy in place 1982    PVC (premature ventricular contraction)     RBBB     Squamous cell carcinoma 03/08/2018    Left superior helix near insertion    Squamous cell carcinoma 04/12/2018    Left forearm x 5    Ventricular tachycardia      Past Surgical History:   Procedure Laterality Date    Jayedkdrp-Iadufqjtqkf-Jw  11/10/2017    Performed by Kiko [facility]ado [facility] at [facility] CATH LAB    cardiac stents      CATARACT EXTRACTION Bilateral     CERVICAL FUSION      colectomy and ileostomy      RAJENDRA-TRANSFORAMINAL N/A 6/4/2015    Performed by Dos [facility] Provider at [facility] CATH LAB    RAJENDRA-TRANSFORAMINAL N/A 12/4/2013    Performed by D[facility] Provider at [facility] CATH LAB    INJECTION, STEROID, SPINE, LUMBAR, EPIDURAL N/A 10/21/2013    Performed by [facility] Provider at [facility] CATH LAB    INJECTION-STEROID-EPIDURAL N/A 5/27/2014    Performed by [facility] Provider at [facility] CATH LAB    INJECTION-STEROID-EPIDURAL-LUMBAR N/A 11/30/2015    Performed by [facility] Provider [facility] CATH LAB    INJECTION-STEROID-EPIDURAL-LUMBAR N/A 9/17/2014    Performed by [facility] Provider [facility] CATH LAB    Left heart cath Left 11/10/2017    Performed by Kiko [facility]ado MD at [facility] CATH LAB    Percutaneous coronary intervention  11/10/2017    Performed by Kiko [facility]ado MD at [facility] CATH LAB    TRANSURETHRAL RESECTION OF PROSTATE      TURP, WITHOUT USING LASER BIPOLAR N/A 1/23/2019    Performed by Catarino Mota MD at [facility] OR [address] [place] [address]     Social History     Tobacco Use    Smoking status: Never  Smoker    Smokeless tobacco: Never Used   Substance Use Topics    Alcohol use: No    Drug use: No     Family History   Problem Relation Age of Onset    Melanoma Neg Hx     Hypertension Neg Hx     Diabetes Neg Hx     Arthritis Neg Hx      Allergy:  No Known Allergies  Outpatient Encounter Medications as of 4/5/2019   Medication Sig Dispense Refill    allopurinol (ZYLOPRIM) 300 MG tablet 1 1/2 - 2 a day      aspirin (ECOTRIN) 81 MG EC tablet Take 81 mg by mouth once daily.        BD ALCOHOL SWABS PadM       blood sugar diagnostic (ONE TOUCH ULTRA TEST) Strp USE ONE STRIP TO CHECK GLUCOSE EVERY  each 11    clopidogrel (PLAVIX) 75 mg tablet Take 75 mg by mouth once daily.      ergocalciferol, vitamin D2, (VITAMIN D ORAL) Take by mouth.      finasteride (PROSCAR) 5 mg tablet TAKE 1 TABLET ONE TIME DAILY 90 tablet 3    HYDROcodone-acetaminophen (NORCO) 5-325 mg per tablet Take 1 tablet by mouth every 6 (six) hours as needed. 30 tablet 0    lisinopril (PRINIVIL,ZESTRIL) 5 MG tablet Take 2.5 mg by mouth 2 (two) times daily.      metFORMIN (GLUCOPHAGE) 1000 MG tablet Take 1,000 mg by mouth 2 (two) times daily with meals.      metoprolol succinate (TOPROL-XL) 12.5 MG 24 hr split tablet Take 12.5 mg by mouth once daily.       midodrine (PROAMATINE) 5 MG Tab Take 1 tablet (5 mg total) by mouth 2 (two) times daily with meals. 30 tablet 0    minocycline (MINOCIN,DYNACIN) 100 MG capsule Take 1 capsule (100 mg total) by mouth 2 (two) times daily. 30 capsule 1    pantoprazole (PROTONIX) 40 MG tablet Take 40 mg by mouth once daily.      phenazopyridine (PYRIDIUM) 200 MG tablet Take 1 tablet (200 mg total) by mouth 2 (two) times daily as needed for Pain. 15 tablet 1    pravastatin (PRAVACHOL) 40 MG tablet TAKE 1 TABLET EVERY DAY 90 tablet 3     No facility-administered encounter medications on file as of 4/5/2019.      Review of Systems   General ROS: GENERAL:  No weight gain or loss  SKIN:  No rashes or  lacerations  HEAD:  No headaches  EYES:  No exophthalmos, jaundice or blindness  EARS:  No dizziness, tinnitus or hearing loss  NOSE:  No changes in smell  MOUTH & THROAT:  No dyskinetic movements or obvious goiter  CHEST:  No shortness of breath, hyperventilation or cough  CARDIOVASCULAR:  No tachycardia or chest pain  ABDOMEN:  No nausea, vomiting, pain, constipation or diarrhea  URINARY:  No frequency, dysuria or sexual dysfunction  ENDOCRINE:  No polydipsia, polyuria  MUSCULOSKELETAL:  No pain or stiffness of the joints  NEUROLOGIC:  No weakness, sensory changes, seizures, confusion, memory loss, tremor or other abnormal movements  Physical Exam     Vitals:    04/05/19 0902   BP: (!) 142/94   Pulse: 77     General Appearance:  Alert, cooperative, no distress, appears stated age   Head:  Normocephalic, without obvious abnormality, atraumatic   Eyes:  PERRL, conjunctiva/corneas clear, EOM's intact, fundi benign, both eyes   Ears:  Normal TM's and external ear canals, both ears   Nose: Nares normal, septum midline, mucosa normal, no drainage or sinus tenderness   Throat: Lips, mucosa, and tongue normal; teeth and gums normal   Neck: Supple, symmetrical, trachea midline, no adenopathy, thyroid: not enlarged, symmetric, no tenderness/mass/nodules, no carotid bruit or JVD   Back:   Symmetric, no curvature, ROM normal, no CVA tenderness   Lungs:   Clear to auscultation bilaterally, respirations unlabored   Chest Wall:  No tenderness or deformity   Heart:  Regular rate and rhythm, S1, S2 normal, no murmur, rub or gallop   Abdomen:   Soft, non-tender, bowel sounds active all four quadrants,  no masses, no organomegaly  Ileostomy in tact.   Genitalia:  Normal male, normal testicles, normal epididymis, normal vas palpated.       Extremities: Extremities normal, atraumatic, no cyanosis or edema   Pulses: 2+ and symmetric   Skin: Skin color, texture, turgor normal, no rashes or lesions   Lymph nodes: Cervical,  supraclavicular, and axillary nodes normal   Neurologic: Normal     Prostate unable to check.     PVR 0 ml per bladder scan.    LABS:  Lab Results   Component Value Date    PSA 2.1 09/15/2014    PSA 2.16 05/13/2013    PSA 1.2 03/19/2012    PSADIAG 4.1 (H) 11/21/2018    PSADIAG 3.3 06/15/2018    PSADIAG 3.1 05/08/2018     Lab Results   Component Value Date    CREATININE 1.3 03/12/2019    CREATININE 1.5 (H) 02/01/2019    CREATININE 1.6 (H) 01/08/2019     MRI of prostate 12/9/18  Redemonstration of a focal lesion along the posterior margin of the TURP resection cavity concerning for prostatic cancer.  Lesion is grossly unchanged from examination dated 06/19/2018.    Overall Assessment:    PIRADS 4 (clinically significant cancer is likely to be present)    Number of targets created for potential MR/US fusion biopsy    Residual prostatic tissue: 1    TURP Pathology 1/23/19  SPECIMEN  1) Right prostate chips.  FINAL PATHOLOGIC DIAGNOSIS  Right prostate gland, transurethral resection:  - Benign prostatic tissue with nodular stromal hyperplasia  - Negative for malignancy    Assessment and Plan:  Jarrett was seen today for post-op evaluation.    Diagnoses and all orders for this visit:    Benign prostatic hyperplasia without lower urinary tract symptoms    Elevated PSA    he has been on Finasteride, and his PSA is still rising.  MRI of prostate again showed a lesion in the prostate in the transitional area.  TURP specimen did not reveal any malignancy.  OK to stop Finasteride.  Will follow up with serial PSA.    Kegel exercise for his mild ZEB.  I spent 25 minutes with the patient of which more than half was spent in direct consultation with the patient in regards to our treatment and plan.      Follow-up:  Follow up in about 6 months (around 10/5/2019) for PSA.

## 2019-04-05 NOTE — PATIENT INSTRUCTIONS
Lab Results   Component Value Date    PSA 2.1 09/15/2014    PSA 2.16 05/13/2013    PSA 1.2 03/19/2012    PSADIAG 4.1 (H) 11/21/2018    PSADIAG 3.3 06/15/2018    PSADIAG 3.1 05/08/2018       Kegel Exercises  Kegel exercises dont need special clothing or equipment. Theyre easy to learn and simple to do. And if you do them right, no one can tell youre doing them, so they can be done almost anywhere. Your healthcare provider, nurse, or physical therapist can answer any questions you have and help you get started.    A weak pelvic floor   The pelvic floor muscles may weaken due to aging, pregnancy and vaginal childbirth, injury, surgery, chronic cough, or lack of exercise. If the pelvic floor is weak, your bladder and other pelvic organs may sag out of place. The urethra may also open too easily and allow urine to leak out. Kegel exercises can help you strengthen your pelvic floor muscles. Then they can better support the pelvic organs and control urine flow.  How Kegel exercises are done  Try each of the Kegel exercises described below. When youre doing them, try not to move your leg, buttock, or stomach muscles.  · Contract as if you were stopping your urine stream. But do it when youre not urinating.  · Tighten your rectum as if trying not to pass gas. Contract your anus, but dont move your buttocks.  · You may place a finger or 2 in the vagina and squeeze your finger with your vagina to learn which muscles to tighten.  Try to hold each Kegel for a slow count to 5. You probably wont be able to hold them for that long at first. But keep practicing. It will get easier as your pelvic floor gets stronger. Eventually, special weights that you place in your vagina may be recommended to help make your Kegels even more effective. Visit your healthcare provider if you have difficulties doing Kegel exercises.  Helpful hints  Here are some tips to follow:  · Do your Kegels as often as you can. The more you do them, the  faster youll feel the results.  · Pick an activity you do often as a reminder. For instance, do your Kegels every time you sit down.  · Tighten your pelvic floor before you sneeze, get up from a chair, cough, laugh, or lift. This protects your pelvic floor from injury and can help prevent urine leakage.

## 2019-04-08 ENCOUNTER — OFFICE VISIT (OUTPATIENT)
Dept: PRIMARY CARE CLINIC | Facility: CLINIC | Age: 69
End: 2019-04-08
Payer: MEDICARE

## 2019-04-08 VITALS
RESPIRATION RATE: 18 BRPM | HEART RATE: 71 BPM | BODY MASS INDEX: 25.96 KG/M2 | OXYGEN SATURATION: 97 % | HEIGHT: 74 IN | TEMPERATURE: 99 F | DIASTOLIC BLOOD PRESSURE: 59 MMHG | SYSTOLIC BLOOD PRESSURE: 96 MMHG | WEIGHT: 202.31 LBS

## 2019-04-08 DIAGNOSIS — R14.0 BLOATING: ICD-10-CM

## 2019-04-08 DIAGNOSIS — E11.9 ENCOUNTER FOR DIABETIC FOOT EXAM: ICD-10-CM

## 2019-04-08 DIAGNOSIS — E11.9 TYPE 2 DIABETES MELLITUS WITHOUT COMPLICATION, WITHOUT LONG-TERM CURRENT USE OF INSULIN: Primary | ICD-10-CM

## 2019-04-08 DIAGNOSIS — Z12.5 PROSTATE CANCER SCREENING: ICD-10-CM

## 2019-04-08 DIAGNOSIS — Z13.6 ENCOUNTER FOR SCREENING FOR CARDIOVASCULAR DISORDERS: ICD-10-CM

## 2019-04-08 DIAGNOSIS — I10 ESSENTIAL HYPERTENSION: ICD-10-CM

## 2019-04-08 DIAGNOSIS — E11.40 CHRONIC PAINFUL DIABETIC NEUROPATHY: ICD-10-CM

## 2019-04-08 PROCEDURE — 99999 PR PBB SHADOW E&M-EST. PATIENT-LVL IV: ICD-10-PCS | Mod: PBBFAC,,, | Performed by: INTERNAL MEDICINE

## 2019-04-08 PROCEDURE — 3074F SYST BP LT 130 MM HG: CPT | Mod: CPTII,S$GLB,, | Performed by: INTERNAL MEDICINE

## 2019-04-08 PROCEDURE — 1101F PR PT FALLS ASSESS DOC 0-1 FALLS W/OUT INJ PAST YR: ICD-10-PCS | Mod: CPTII,S$GLB,, | Performed by: INTERNAL MEDICINE

## 2019-04-08 PROCEDURE — 3074F PR MOST RECENT SYSTOLIC BLOOD PRESSURE < 130 MM HG: ICD-10-PCS | Mod: CPTII,S$GLB,, | Performed by: INTERNAL MEDICINE

## 2019-04-08 PROCEDURE — 3078F DIAST BP <80 MM HG: CPT | Mod: CPTII,S$GLB,, | Performed by: INTERNAL MEDICINE

## 2019-04-08 PROCEDURE — 99214 OFFICE O/P EST MOD 30 MIN: CPT | Mod: S$GLB,,, | Performed by: INTERNAL MEDICINE

## 2019-04-08 PROCEDURE — 3044F HG A1C LEVEL LT 7.0%: CPT | Mod: CPTII,S$GLB,, | Performed by: INTERNAL MEDICINE

## 2019-04-08 PROCEDURE — 1101F PT FALLS ASSESS-DOCD LE1/YR: CPT | Mod: CPTII,S$GLB,, | Performed by: INTERNAL MEDICINE

## 2019-04-08 PROCEDURE — 3044F PR MOST RECENT HEMOGLOBIN A1C LEVEL <7.0%: ICD-10-PCS | Mod: CPTII,S$GLB,, | Performed by: INTERNAL MEDICINE

## 2019-04-08 PROCEDURE — 99999 PR PBB SHADOW E&M-EST. PATIENT-LVL IV: CPT | Mod: PBBFAC,,, | Performed by: INTERNAL MEDICINE

## 2019-04-08 PROCEDURE — 99214 PR OFFICE/OUTPT VISIT, EST, LEVL IV, 30-39 MIN: ICD-10-PCS | Mod: S$GLB,,, | Performed by: INTERNAL MEDICINE

## 2019-04-08 PROCEDURE — 3078F PR MOST RECENT DIASTOLIC BLOOD PRESSURE < 80 MM HG: ICD-10-PCS | Mod: CPTII,S$GLB,, | Performed by: INTERNAL MEDICINE

## 2019-04-08 RX ORDER — GABAPENTIN 600 MG/1
600 TABLET ORAL 2 TIMES DAILY
COMMUNITY
End: 2019-07-29 | Stop reason: SDUPTHER

## 2019-06-10 ENCOUNTER — OFFICE VISIT (OUTPATIENT)
Dept: PRIMARY CARE CLINIC | Facility: CLINIC | Age: 69
End: 2019-06-10
Payer: MEDICARE

## 2019-06-10 VITALS
WEIGHT: 205 LBS | DIASTOLIC BLOOD PRESSURE: 74 MMHG | RESPIRATION RATE: 18 BRPM | BODY MASS INDEX: 26.31 KG/M2 | HEIGHT: 74 IN | HEART RATE: 105 BPM | TEMPERATURE: 99 F | OXYGEN SATURATION: 97 % | SYSTOLIC BLOOD PRESSURE: 111 MMHG

## 2019-06-10 DIAGNOSIS — E11.9 TYPE 2 DIABETES MELLITUS WITHOUT COMPLICATION, WITHOUT LONG-TERM CURRENT USE OF INSULIN: ICD-10-CM

## 2019-06-10 DIAGNOSIS — J32.9 SINUSITIS, UNSPECIFIED CHRONICITY, UNSPECIFIED LOCATION: Primary | ICD-10-CM

## 2019-06-10 DIAGNOSIS — K21.9 GASTROESOPHAGEAL REFLUX DISEASE, ESOPHAGITIS PRESENCE NOT SPECIFIED: ICD-10-CM

## 2019-06-10 DIAGNOSIS — R42 DIZZINESS: ICD-10-CM

## 2019-06-10 DIAGNOSIS — Z87.438 HISTORY OF BPH: ICD-10-CM

## 2019-06-10 DIAGNOSIS — J02.9 PHARYNGITIS, UNSPECIFIED ETIOLOGY: ICD-10-CM

## 2019-06-10 DIAGNOSIS — Z87.898 HISTORY OF ELEVATED PSA: ICD-10-CM

## 2019-06-10 DIAGNOSIS — Z87.19 HISTORY OF ULCERATIVE COLITIS: ICD-10-CM

## 2019-06-10 DIAGNOSIS — R32 URINARY INCONTINENCE, UNSPECIFIED TYPE: ICD-10-CM

## 2019-06-10 DIAGNOSIS — J40 BRONCHITIS: ICD-10-CM

## 2019-06-10 DIAGNOSIS — Z87.39 HISTORY OF GOUT: ICD-10-CM

## 2019-06-10 DIAGNOSIS — Z93.2 PRESENCE OF ILEOSTOMY: ICD-10-CM

## 2019-06-10 PROCEDURE — 99213 PR OFFICE/OUTPT VISIT, EST, LEVL III, 20-29 MIN: ICD-10-PCS | Mod: 25,S$GLB,, | Performed by: FAMILY MEDICINE

## 2019-06-10 PROCEDURE — 3044F PR MOST RECENT HEMOGLOBIN A1C LEVEL <7.0%: ICD-10-PCS | Mod: CPTII,S$GLB,, | Performed by: FAMILY MEDICINE

## 2019-06-10 PROCEDURE — 99999 PR PBB SHADOW E&M-EST. PATIENT-LVL IV: CPT | Mod: PBBFAC,,, | Performed by: FAMILY MEDICINE

## 2019-06-10 PROCEDURE — 99213 OFFICE O/P EST LOW 20 MIN: CPT | Mod: 25,S$GLB,, | Performed by: FAMILY MEDICINE

## 2019-06-10 PROCEDURE — 1101F PT FALLS ASSESS-DOCD LE1/YR: CPT | Mod: CPTII,S$GLB,, | Performed by: FAMILY MEDICINE

## 2019-06-10 PROCEDURE — 1101F PR PT FALLS ASSESS DOC 0-1 FALLS W/OUT INJ PAST YR: ICD-10-PCS | Mod: CPTII,S$GLB,, | Performed by: FAMILY MEDICINE

## 2019-06-10 PROCEDURE — 99999 PR PBB SHADOW E&M-EST. PATIENT-LVL IV: ICD-10-PCS | Mod: PBBFAC,,, | Performed by: FAMILY MEDICINE

## 2019-06-10 PROCEDURE — 3044F HG A1C LEVEL LT 7.0%: CPT | Mod: CPTII,S$GLB,, | Performed by: FAMILY MEDICINE

## 2019-06-10 PROCEDURE — 3074F PR MOST RECENT SYSTOLIC BLOOD PRESSURE < 130 MM HG: ICD-10-PCS | Mod: CPTII,S$GLB,, | Performed by: FAMILY MEDICINE

## 2019-06-10 PROCEDURE — 3078F PR MOST RECENT DIASTOLIC BLOOD PRESSURE < 80 MM HG: ICD-10-PCS | Mod: CPTII,S$GLB,, | Performed by: FAMILY MEDICINE

## 2019-06-10 PROCEDURE — 96372 PR INJECTION,THERAP/PROPH/DIAG2ST, IM OR SUBCUT: ICD-10-PCS | Mod: S$GLB,,, | Performed by: FAMILY MEDICINE

## 2019-06-10 PROCEDURE — 3078F DIAST BP <80 MM HG: CPT | Mod: CPTII,S$GLB,, | Performed by: FAMILY MEDICINE

## 2019-06-10 PROCEDURE — 96372 THER/PROPH/DIAG INJ SC/IM: CPT | Mod: S$GLB,,, | Performed by: FAMILY MEDICINE

## 2019-06-10 PROCEDURE — 3074F SYST BP LT 130 MM HG: CPT | Mod: CPTII,S$GLB,, | Performed by: FAMILY MEDICINE

## 2019-06-10 RX ORDER — PROMETHAZINE HYDROCHLORIDE AND CODEINE PHOSPHATE 6.25; 1 MG/5ML; MG/5ML
5 SOLUTION ORAL EVERY 6 HOURS PRN
Qty: 180 ML | Refills: 0 | Status: ON HOLD | OUTPATIENT
Start: 2019-06-10 | End: 2019-07-20

## 2019-06-10 RX ORDER — METOPROLOL SUCCINATE 25 MG/1
25 TABLET, EXTENDED RELEASE ORAL DAILY
COMMUNITY
End: 2020-04-02 | Stop reason: SDUPTHER

## 2019-06-10 RX ORDER — LEVOFLOXACIN 500 MG/1
500 TABLET, FILM COATED ORAL DAILY
Qty: 10 TABLET | Refills: 0 | Status: SHIPPED | OUTPATIENT
Start: 2019-06-10 | End: 2019-06-20

## 2019-06-10 RX ORDER — METHYLPREDNISOLONE 4 MG/1
TABLET ORAL
Qty: 1 PACKAGE | Refills: 0 | Status: ON HOLD | OUTPATIENT
Start: 2019-06-10 | End: 2019-07-20

## 2019-06-10 RX ORDER — TRIAMCINOLONE ACETONIDE 40 MG/ML
40 INJECTION, SUSPENSION INTRA-ARTICULAR; INTRAMUSCULAR ONCE
Status: COMPLETED | OUTPATIENT
Start: 2019-06-10 | End: 2019-06-10

## 2019-06-10 RX ADMIN — TRIAMCINOLONE ACETONIDE 40 MG: 40 INJECTION, SUSPENSION INTRA-ARTICULAR; INTRAMUSCULAR at 09:06

## 2019-06-10 NOTE — PROGRESS NOTES
"  Subjective:       Patient ID: Jarrett Charlton Jr. is a 69 y.o. male.    Chief Complaint: Cough (congestion); Fever; and Back Pain    HPI: 70 yo WM-- pt with hx ileostomy-- pt had a history of a black spot on his prostate--went in through the penis--and remove the black spot--about 3 months ago.  States doing much better still with some leakage has an appointment next month.       Today mainly having a cold started 4 days ago--fever up to 101--Sat and Saturday night--+chills, +runny stuffy nose, +postnasal drip, +sore throat, +cough, all mucus coming out of the nose not able cough up any phlegm.  No pneumonia asthma TB.  No smoking.  Patient was taking care grandchildren this weekend.  Friday the grandchildren was going in the refrigerator patient went to stop him  and slammed his left index finger in the refrigerator --had significant swelling right after the injury--swelling much improved today--able to do all range of motion good hand grasp good opposition thumb index finger.    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 has appt with Dr Dao -- end next month scheduled for lab --stress test --"the works"-had stent put in 1 year ago  Abdomen: No nausea, vomiting, diarrhea, constipation, ulcers, hepatitis, gallbladder disease, melena, hematochezia, hematemesis has ileostomy entire large intestine removed secondary to ulcerative  colitis +GERD history abnormal liver functions in the past  : no UTI, renal disease, " stones history chronic renal insufficiency/BPH/elevated PSA/status post TURP--had recent prostate surgery for a black spot on the prostate removed via cystoscopy  MS: no fractures, lupus, rheumatoid, +gout +osteoarthritis DDD lumbar spine  Neuro: + dizziness,+ LOC--several times with dehydration and hypotension-- no recent episodes no  seizures   + diabetes last hemoglobin A1c and glucose is been doing well, no anemia, no anxiety, no depression   , one child, 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, lives with wife    Objective:   Physical Exam:  General: Well nourished, well developed, no acute distress +overweight  Skin: No lesions  HEENT: Eyes PERRLA, EOM intact, nose clear discharge, throat +1/for erythematous ears TMs clear  NECK: Supple, no bruits, No JVD, no nodes  Lungs: Clear, no rales, rhonchi, wheezing +coarse cough  Heart: Regular rate and rhythm, no murmurs, gallops, or rubs  Abdomen: flat, bowel sounds positive, no tenderness, or organomegaly  MS: Range of motion and muscle strength intact  Neuro: Alert, CN intact, oriented X 3  Extremities: No cyanosis, clubbing, or edema         Assessment:       1. Sinusitis, unspecified chronicity, unspecified location    2. Pharyngitis, unspecified etiology    3. Bronchitis    4. Type 2 diabetes mellitus without complication, without long-term current use of insulin    5. Gastroesophageal reflux disease, esophagitis presence not specified    6. History of ulcerative colitis    7. Presence of ileostomy    8. History of gout    9. History of elevated PSA    10. History of BPH    11. Urinary incontinence, unspecified type    12. Dizziness        Plan:       Sinusitis, unspecified chronicity, unspecified location    Pharyngitis, unspecified etiology    Bronchitis    Type 2 diabetes mellitus without complication, without long-term current use of insulin    Gastroesophageal reflux disease, esophagitis presence not specified    History of ulcerative colitis    Presence  of ileostomy    History of gout    History of elevated PSA    History of BPH    Urinary incontinence, unspecified type    Dizziness    Other orders  -     triamcinolone acetonide injection 40 mg  -     methylPREDNISolone (MEDROL DOSEPACK) 4 mg tablet; use as directed  Dispense: 1 Package; Refill: 0  -     levoFLOXacin (LEVAQUIN) 500 MG tablet; Take 1 tablet (500 mg total) by mouth once daily. for 10 days  Dispense: 10 tablet; Refill: 0  -     promethazine-codeine 6.25-10 mg/5 ml (PHENERGAN WITH CODEINE) 6.25-10 mg/5 mL syrup; Take 5 mLs by mouth every 6 (six) hours as needed for Cough.  Dispense: 180 mL; Refill: 0      cold--sinusitis/pharyngitis/bronchitis --sinusitis is the worst--Kenalog/Levaquin/Phenergan with codeine/Medrol Dosepak  Patient recently had prostate surgery to remove a black spot from the prostate about 3 months ago  Was taking care of his grandchildren on Friday sick on Saturday with 101 fever  Has appoint with  for routine lab told to include a hemoglobin A2b--[facility] are going to do the works--usually means that they will do a stress test/carotid ultrasound/echocardiogram/24 hr Holter/occasional femoral Doppler/has an aneurysm so may do a test to evaluate aneurysm in the chest that was 4 cm  If desires  to follow DM come in in 3 mo with accu checks see if any change medication indicated

## 2019-06-10 NOTE — PROGRESS NOTES
Patient ID verified by name and . NKDA. Kenalog 40 mg IM injection given in left VG using aseptic technique. Aspirated with no blood noted. Patient tolerated well. Given per physicians order. No adverse reaction noted.

## 2019-07-10 DIAGNOSIS — N18.30 STAGE 3 CHRONIC KIDNEY DISEASE: Primary | ICD-10-CM

## 2019-07-11 ENCOUNTER — TELEPHONE (OUTPATIENT)
Dept: PODIATRY | Facility: CLINIC | Age: 69
End: 2019-07-11

## 2019-07-11 NOTE — TELEPHONE ENCOUNTER
----- Message from Jerome Haynes sent at 7/11/2019  3:41 PM CDT -----  Contact: patient  Please call pt at [phone]    Diabetic patient is requesting an immediate appt for severe toe pain    Thank you

## 2019-07-12 ENCOUNTER — OFFICE VISIT (OUTPATIENT)
Dept: PODIATRY | Facility: CLINIC | Age: 69
End: 2019-07-12
Payer: MEDICARE

## 2019-07-12 VITALS
WEIGHT: 205 LBS | BODY MASS INDEX: 26.31 KG/M2 | HEART RATE: 68 BPM | SYSTOLIC BLOOD PRESSURE: 142 MMHG | DIASTOLIC BLOOD PRESSURE: 93 MMHG | HEIGHT: 74 IN

## 2019-07-12 DIAGNOSIS — L81.9 DISCOLORATION OF SKIN OF TOE: Primary | ICD-10-CM

## 2019-07-12 PROCEDURE — 3080F DIAST BP >= 90 MM HG: CPT | Mod: CPTII,S$GLB,, | Performed by: PODIATRIST

## 2019-07-12 PROCEDURE — 1101F PR PT FALLS ASSESS DOC 0-1 FALLS W/OUT INJ PAST YR: ICD-10-PCS | Mod: CPTII,S$GLB,, | Performed by: PODIATRIST

## 2019-07-12 PROCEDURE — 99203 PR OFFICE/OUTPT VISIT, NEW, LEVL III, 30-44 MIN: ICD-10-PCS | Mod: S$GLB,,, | Performed by: PODIATRIST

## 2019-07-12 PROCEDURE — 1101F PT FALLS ASSESS-DOCD LE1/YR: CPT | Mod: CPTII,S$GLB,, | Performed by: PODIATRIST

## 2019-07-12 PROCEDURE — 99999 PR PBB SHADOW E&M-EST. PATIENT-LVL IV: ICD-10-PCS | Mod: PBBFAC,,, | Performed by: PODIATRIST

## 2019-07-12 PROCEDURE — 3077F SYST BP >= 140 MM HG: CPT | Mod: CPTII,S$GLB,, | Performed by: PODIATRIST

## 2019-07-12 PROCEDURE — 99203 OFFICE O/P NEW LOW 30 MIN: CPT | Mod: S$GLB,,, | Performed by: PODIATRIST

## 2019-07-12 PROCEDURE — 3077F PR MOST RECENT SYSTOLIC BLOOD PRESSURE >= 140 MM HG: ICD-10-PCS | Mod: CPTII,S$GLB,, | Performed by: PODIATRIST

## 2019-07-12 PROCEDURE — 99999 PR PBB SHADOW E&M-EST. PATIENT-LVL IV: CPT | Mod: PBBFAC,,, | Performed by: PODIATRIST

## 2019-07-12 PROCEDURE — 3080F PR MOST RECENT DIASTOLIC BLOOD PRESSURE >= 90 MM HG: ICD-10-PCS | Mod: CPTII,S$GLB,, | Performed by: PODIATRIST

## 2019-07-15 ENCOUNTER — TELEPHONE (OUTPATIENT)
Dept: PODIATRY | Facility: CLINIC | Age: 69
End: 2019-07-15

## 2019-07-15 NOTE — TELEPHONE ENCOUNTER
Called patient advice him that vascular department is aware of the pednding referral and will give him a call to schedule the appointment.

## 2019-07-15 NOTE — TELEPHONE ENCOUNTER
----- Message from Mayuri Kennedy sent at 7/15/2019  1:02 PM CDT -----  Contact: Self   Pt has not received a call from anyone at the vascular surgery dept. He wanted to let Dr Baca know.     [phone]

## 2019-07-17 DIAGNOSIS — M79.609 PAIN IN EXTREMITY, UNSPECIFIED EXTREMITY: Primary | ICD-10-CM

## 2019-07-19 ENCOUNTER — HOSPITAL ENCOUNTER (INPATIENT)
Facility: OTHER | Age: 69
LOS: 1 days | Discharge: HOME | DRG: 395 | End: 2019-07-20
Attending: HOSPITALIST | Admitting: HOSPITALIST
Payer: MEDICARE

## 2019-07-19 DIAGNOSIS — M1A.09X1 IDIOPATHIC CHRONIC GOUT OF MULTIPLE SITES WITH TOPHUS: ICD-10-CM

## 2019-07-19 DIAGNOSIS — N18.2 TYPE 2 DIABETES MELLITUS WITH STAGE 2 CHRONIC KIDNEY DISEASE, WITHOUT LONG-TERM CURRENT USE OF INSULIN: Primary | ICD-10-CM

## 2019-07-19 DIAGNOSIS — I10 ESSENTIAL HYPERTENSION: ICD-10-CM

## 2019-07-19 DIAGNOSIS — E11.22 TYPE 2 DIABETES MELLITUS WITH STAGE 2 CHRONIC KIDNEY DISEASE, WITHOUT LONG-TERM CURRENT USE OF INSULIN: Primary | ICD-10-CM

## 2019-07-19 DIAGNOSIS — K43.3 PARASTOMAL HERNIA WITH OBSTRUCTION AND WITHOUT GANGRENE: ICD-10-CM

## 2019-07-19 DIAGNOSIS — N18.2 CKD (CHRONIC KIDNEY DISEASE) STAGE 2, GFR 60-89 ML/MIN: ICD-10-CM

## 2019-07-19 DIAGNOSIS — K56.609 BOWEL OBSTRUCTION: ICD-10-CM

## 2019-07-19 DIAGNOSIS — K43.2 INCISIONAL HERNIA, WITHOUT OBSTRUCTION OR GANGRENE: ICD-10-CM

## 2019-07-19 PROBLEM — K43.5 PARASTOMAL HERNIA: Status: ACTIVE | Noted: 2019-07-19

## 2019-07-19 LAB
ESTIMATED AVG GLUCOSE: 143 MG/DL (ref 68–131)
HBA1C MFR BLD HPLC: 6.6 % (ref 4–5.6)
POCT GLUCOSE: 92 MG/DL (ref 70–110)

## 2019-07-19 PROCEDURE — 63600175 PHARM REV CODE 636 W HCPCS: Performed by: HOSPITALIST

## 2019-07-19 PROCEDURE — 83036 HEMOGLOBIN GLYCOSYLATED A1C: CPT

## 2019-07-19 PROCEDURE — 36415 COLL VENOUS BLD VENIPUNCTURE: CPT

## 2019-07-19 PROCEDURE — 99223 1ST HOSP IP/OBS HIGH 75: CPT | Mod: AI,,, | Performed by: HOSPITALIST

## 2019-07-19 PROCEDURE — 25000003 PHARM REV CODE 250: Performed by: HOSPITALIST

## 2019-07-19 PROCEDURE — 94761 N-INVAS EAR/PLS OXIMETRY MLT: CPT

## 2019-07-19 PROCEDURE — 11000001 HC ACUTE MED/SURG PRIVATE ROOM

## 2019-07-19 PROCEDURE — 99223 PR INITIAL HOSPITAL CARE,LEVL III: ICD-10-PCS | Mod: AI,,, | Performed by: HOSPITALIST

## 2019-07-19 RX ORDER — SODIUM CHLORIDE 0.9 % (FLUSH) 0.9 %
10 SYRINGE (ML) INJECTION
Status: DISCONTINUED | OUTPATIENT
Start: 2019-07-19 | End: 2019-07-20 | Stop reason: HOSPADM

## 2019-07-19 RX ORDER — ACETAMINOPHEN 325 MG/1
650 TABLET ORAL EVERY 6 HOURS PRN
Status: DISCONTINUED | OUTPATIENT
Start: 2019-07-19 | End: 2019-07-20 | Stop reason: HOSPADM

## 2019-07-19 RX ORDER — SODIUM CHLORIDE 9 MG/ML
INJECTION, SOLUTION INTRAVENOUS CONTINUOUS
Status: DISCONTINUED | OUTPATIENT
Start: 2019-07-19 | End: 2019-07-20 | Stop reason: HOSPADM

## 2019-07-19 RX ORDER — GLUCAGON 1 MG
1 KIT INJECTION
Status: DISCONTINUED | OUTPATIENT
Start: 2019-07-19 | End: 2019-07-20 | Stop reason: HOSPADM

## 2019-07-19 RX ORDER — METOPROLOL SUCCINATE 25 MG/1
25 TABLET, EXTENDED RELEASE ORAL DAILY
Status: DISCONTINUED | OUTPATIENT
Start: 2019-07-19 | End: 2019-07-20 | Stop reason: HOSPADM

## 2019-07-19 RX ORDER — IBUPROFEN 200 MG
16 TABLET ORAL
Status: DISCONTINUED | OUTPATIENT
Start: 2019-07-19 | End: 2019-07-20 | Stop reason: HOSPADM

## 2019-07-19 RX ORDER — ONDANSETRON 2 MG/ML
4 INJECTION INTRAMUSCULAR; INTRAVENOUS EVERY 8 HOURS PRN
Status: DISCONTINUED | OUTPATIENT
Start: 2019-07-19 | End: 2019-07-20 | Stop reason: HOSPADM

## 2019-07-19 RX ORDER — ALLOPURINOL 100 MG/1
300 TABLET ORAL DAILY
Status: DISCONTINUED | OUTPATIENT
Start: 2019-07-19 | End: 2019-07-20 | Stop reason: HOSPADM

## 2019-07-19 RX ORDER — MORPHINE SULFATE 4 MG/ML
4 INJECTION, SOLUTION INTRAMUSCULAR; INTRAVENOUS EVERY 6 HOURS PRN
Status: DISCONTINUED | OUTPATIENT
Start: 2019-07-19 | End: 2019-07-19

## 2019-07-19 RX ORDER — IBUPROFEN 200 MG
24 TABLET ORAL
Status: DISCONTINUED | OUTPATIENT
Start: 2019-07-19 | End: 2019-07-20 | Stop reason: HOSPADM

## 2019-07-19 RX ORDER — INSULIN ASPART 100 [IU]/ML
0-5 INJECTION, SOLUTION INTRAVENOUS; SUBCUTANEOUS
Status: DISCONTINUED | OUTPATIENT
Start: 2019-07-19 | End: 2019-07-20 | Stop reason: HOSPADM

## 2019-07-19 RX ORDER — CLOPIDOGREL BISULFATE 75 MG/1
75 TABLET ORAL DAILY
Status: DISCONTINUED | OUTPATIENT
Start: 2019-07-19 | End: 2019-07-20 | Stop reason: HOSPADM

## 2019-07-19 RX ADMIN — ALLOPURINOL 300 MG: 100 TABLET ORAL at 10:07

## 2019-07-19 RX ADMIN — METOPROLOL SUCCINATE 25 MG: 25 TABLET, EXTENDED RELEASE ORAL at 10:07

## 2019-07-19 RX ADMIN — SODIUM CHLORIDE: 0.9 INJECTION, SOLUTION INTRAVENOUS at 07:07

## 2019-07-19 RX ADMIN — ONDANSETRON 4 MG: 2 INJECTION INTRAMUSCULAR; INTRAVENOUS at 10:07

## 2019-07-19 RX ADMIN — SODIUM CHLORIDE: 0.9 INJECTION, SOLUTION INTRAVENOUS at 10:07

## 2019-07-19 RX ADMIN — MORPHINE SULFATE 4 MG: 4 INJECTION INTRAVENOUS at 10:07

## 2019-07-19 RX ADMIN — ACETAMINOPHEN 650 MG: 325 TABLET, FILM COATED ORAL at 07:07

## 2019-07-19 NOTE — HPI
Mr. Charlton is a 69 year old man with an ileostomy who presented to [facility] with nausea and abdominal pain since 12 pm.  He has had bowel obstructions in the past and states this feels the same.  Normally this is a high output ostomy but he has had no output since the pain started.  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  Patient's labs were at baseline.  NG tube was placed and the hernia was reduced by the ED physician.  He did start having output into the bag prior to being transferred to [facility] for further management.    Medical history includes ulcerative colitis with colectomy and end ileostomy done in 1985.  He has required revisions of the ileostomy and ultimately it was transposed to the right side due to parastomal and incisional hernias.  He has had intermittent problems with syncope due to dehydration from the high output ostomy and takes imodium and Pedialyte as needed as well as daily Citrucel.  He is followed by CRS at [facility].  History also includes diabetes (on metformin) gout, CKD II and HTN.  He had a TURP in 1/2019 and is under surveillance with regular PSAs.

## 2019-07-19 NOTE — SUBJECTIVE & OBJECTIVE
Past Medical History:   Diagnosis Date    Basal cell carcinoma     BPH (benign prostatic hyperplasia)     s/p TURP    Carotid stenosis     Chronic kidney disease     Claudication     DDD (degenerative disc disease) 10/21/2013    Diabetes mellitus with renal complications     Disc disease, degenerative, cervical     GERD (gastroesophageal reflux disease)     Gout, chronic     History of ulcerative colitis     s/p colectomy and ileostomy    HLD (hyperlipidemia)     HTN (hypertension)     Ileostomy in place 1982    RBBB     Squamous cell carcinoma 03/08/2018    Left superior helix near insertion    Squamous cell carcinoma 04/12/2018    Left forearm x 5    Ventricular tachycardia        Past Surgical History:   Procedure Laterality Date    Kmfmzbeae-Aucpewfdglp-Rg  11/10/2017    Performed by Kiko Alvarado MD at [facility] CATH LAB    cardiac stents      CATARACT EXTRACTION Bilateral     CERVICAL FUSION      colectomy and ileostomy  1985    RAJENDRA-TRANSFORAMINAL N/A 6/4/2015    Performed by [facility] Diagnostic Provider at [facility] CATH LAB    RAJENDRA-TRANSFORAMINAL N/A 12/4/2013    Performed by [facility] Diagnostic Provider at [facility] CATH LAB    INJECTION, STEROID, SPINE, LUMBAR, EPIDURAL N/A 10/21/2013    Performed by [facility] Diagnostic Provider at [facility] CATH LAB    INJECTION-STEROID-EPIDURAL N/A 5/27/2014    Performed by [facility] Provider at [facility] CATH LAB    INJECTION-STEROID-EPIDURAL-LUMBAR N/A 11/30/2015    Performed by [facility] Diagnostic Provider at [facility] CATH LAB    INJECTION-STEROID-EPIDURAL-LUMBAR N/A 9/17/2014    Performed by [facility] Diagnostic Provider at [facility] CATH LAB    Left heart cath Left 11/10/2017    Performed by Kiko Alvarado MD at [facility] CATH LAB    Percutaneous coronary intervention  11/10/2017    Performed by Kiko Alvarado MD at [facility] CATH LAB    TURP, WITHOUT USING LASER BIPOLAR N/A 1/23/2019    Performed by Catarino Mota MD at [facility] OR [address] [place] [address]       Review of patient's allergies  indicates:  No Known Allergies    Current Facility-Administered Medications on File Prior to Encounter   Medication    [COMPLETED] hydromorphone (PF) injection 1 mg    [COMPLETED] ondansetron injection 4 mg    [COMPLETED] ondansetron injection 8 mg    [COMPLETED] sodium chloride 0.9% bolus 1,000 mL     Current Outpatient Medications on File Prior to Encounter   Medication Sig    allopurinol (ZYLOPRIM) 300 MG tablet 1 1/2 - 2 a day    aspirin (ECOTRIN) 81 MG EC tablet Take 81 mg by mouth once daily.      ergocalciferol, vitamin D2, (VITAMIN D ORAL) Take by mouth.    gabapentin (NEURONTIN) 600 MG tablet Take 600 mg by mouth 2 (two) times daily.    metFORMIN (GLUCOPHAGE) 1000 MG tablet Take 1,000 mg by mouth 2 (two) times daily with meals.    metoprolol succinate (TOPROL XL) 25 MG 24 hr tablet Take 25 mg by mouth once daily.    pantoprazole (PROTONIX) 40 MG tablet Take 40 mg by mouth once daily.    pravastatin (PRAVACHOL) 40 MG tablet TAKE 1 TABLET EVERY DAY    BD ALCOHOL SWABS Pad     blood sugar diagnostic (ONE TOUCH ULTRA TEST) Strp USE ONE STRIP TO CHECK GLUCOSE EVERY DAY    clopidogrel (PLAVIX) 75 mg tablet Take 75 mg by mouth once daily.    methylPREDNISolone (MEDROL DOSEPACK) 4 mg tablet use as directed    promethazine-codeine 6.25-10 mg/5 ml (PHENERGAN WITH CODEINE) 6.25-10 mg/5 mL syrup Take 5 mLs by mouth every 6 (six) hours as needed for Cough.     Family History     None        Tobacco Use    Smoking status: Never Smoker    Smokeless tobacco: Never Used   Substance and Sexual Activity    Alcohol use: No    Drug use: No    Sexual activity: Not on file     Review of Systems   Constitutional: Negative for chills and fever.   HENT: Negative for rhinorrhea and sore throat.    Eyes: Negative for photophobia and visual disturbance.   Respiratory: Negative for cough and shortness of breath.    Cardiovascular: Negative for chest pain and palpitations.   Gastrointestinal: Positive for abdominal  distention, abdominal pain, diarrhea, nausea and vomiting.   Genitourinary: Negative for dysuria and frequency.   Musculoskeletal: Negative for back pain and gait problem.   Neurological: Negative for weakness and headaches.   Psychiatric/Behavioral: Negative for confusion and dysphoric mood.     Objective:     Vital Signs (Most Recent):  Temp: 96.5 °F (35.8 °C) (07/19/19 0956)  Pulse: 65 (07/19/19 0956)  Resp: 16 (07/19/19 0956)  BP: (!) 139/93 (07/19/19 0956)  SpO2: 99 % (07/19/19 0956) Vital Signs (24h Range):  Temp:  [96.5 °F (35.8 °C)-97.8 °F (36.6 °C)] 96.5 °F (35.8 °C)  Pulse:  [65-80] 65  Resp:  [16-24] 16  SpO2:  [95 %-99 %] 99 %  BP: (134-170)/(87-97) 139/93     Weight: 90 kg (198 lb 6.6 oz)  Body mass index is 25.47 kg/m².    Physical Exam   Constitutional: He is oriented to person, place, and time. He appears well-developed and well-nourished.   HENT:   Head: Normocephalic.   Eyes: Pupils are equal, round, and reactive to light. Conjunctivae are normal.   Neck: Neck supple. No thyromegaly present.   Cardiovascular: Normal rate, regular rhythm, normal heart sounds and intact distal pulses. Exam reveals no gallop and no friction rub.   No murmur heard.  Pulmonary/Chest: Effort normal and breath sounds normal.   Abdominal: Soft. Bowel sounds are normal. He exhibits distension. There is no tenderness.   Old ostomy scar on left.  Current ostomy on the right with air and yellow watery stool in bag.  Appears clean and without signs of infection.  Midline vertical surgical scar.   Musculoskeletal: Normal range of motion. He exhibits no edema.   Lymphadenopathy:     He has no cervical adenopathy.   Neurological: He is alert and oriented to person, place, and time.   Strength equal and symmetric   Skin: Skin is warm and dry. No rash noted.   Psychiatric: He has a normal mood and affect. His behavior is normal. Thought content normal.         CRANIAL NERVES     CN III, IV, VI   Pupils are equal, round, and reactive  to light.       Significant Labs: All pertinent labs within the past 24 hours have been reviewed.    Significant Imaging: I have reviewed all pertinent imaging results/findings within the past 24 hours.

## 2019-07-19 NOTE — NURSING
Transferred care to Yaquelin Reddy.  Patient was laying in bed comfortably.  Pain was reported improved after the administration of morphine from this am.  Still complaining of abdominal cramping but has not requested any medication.   Dr. Anderson removed NG tube around 0900. Output prior to removal was only 50cc.  Patient tolerated procedure well.  Patient is still NPO except for medications and ice chips.  Vitally stable.   Abdomen soft and nontender. Bowel sounds positive.  Continue monitoring.

## 2019-07-19 NOTE — ASSESSMENT & PLAN NOTE
- Patient with bowel herniation into ostomy bag which obstructed flow of stool.  Reduced in ED at [facility] after NGT placement.  - Evaluated here by Dr. Anderson on arrival, stool and air noted in bag and NGT removed.  Recommended continuing IV fluids and starting ice chips.  Monitor closely with daily labs.  - Will need follow up with CRS at [facility] for attention to parastomal hernias.  Patient reports he has had 4 previous similar episodes.

## 2019-07-19 NOTE — CONSULTS
[facility]  Consult      Date of Consultation:7/19/2019    History of Present Illness:  This 69-year-old white male presented to [facility] with complaints of abdominal pain and nausea.  He was evaluated with a CT scan which revealed para ostomy hernia on the right side. The hernia was reduced by the ER physician and patient was transferred here for further treatment.  This patient is status post total colectomy with ileostomy in the right lower quadrant. He is feeling better with less abdominal pain and has output from the ileostomy.    Past Surgical History:   Procedure Laterality Date    Knmcnuwco-Fszeebhndow-Du  11/10/2017    Performed by Kiko Alvarado MD at [facility] CATH LAB    cardiac stents      CATARACT EXTRACTION Bilateral     CERVICAL FUSION      colectomy and ileostomy      RAJENDRA-TRANSFORAMINAL N/A 6/4/2015    Performed by [facility] Diagnostic Provider at [facility] CATH LAB    RAJENDRA-TRANSFORAMINAL N/A 12/4/2013    Performed by [facility] Diagnostic Provider at [facility] CATH LAB    INJECTION, STEROID, SPINE, LUMBAR, EPIDURAL N/A 10/21/2013    Performed by [facility] Diagnostic Provider at [facility] CATH LAB    INJECTION-STEROID-EPIDURAL N/A 5/27/2014    Performed by [facility] Diagnostic Provider at [facility] CATH LAB    INJECTION-STEROID-EPIDURAL-LUMBAR N/A 11/30/2015    Performed by [facility] Diagnostic Provider at [facility] CATH LAB    INJECTION-STEROID-EPIDURAL-LUMBAR N/A 9/17/2014    Performed by [facility] Diagnostic Provider at [facility] CATH LAB    Left heart cath Left 11/10/2017    Performed by Kiko Alvarado MD at [facility] CATH LAB    Percutaneous coronary intervention  11/10/2017    Performed by Kiko Alvarado MD at [facility] CATH LAB    TRANSURETHRAL RESECTION OF PROSTATE      TURP, WITHOUT USING LASER BIPOLAR N/A 1/23/2019    Performed by Catarino Mota MD at [facility] OR [address] [place] [address]          Physical Exam:  In no acute distress  Head is eyes nose throat within normal limits  Chest is clear  Heart rate and rhythm are  regular.  Abdomen with ileostomy in the right lower quadrant. Abdomen is flat nondistended with minimal tenderness. There is no palpable or visible salma ostomy hernia.  Extremities no edema.      Impression:  Parastomal hernia which was reduced at the [facility].    Rec:  IV fluids and observation.            Will need parastomal hernia repair in the future.    Thank you for the opportunity of seeing Jarrett Charlton Jr. in consultation

## 2019-07-19 NOTE — ASSESSMENT & PLAN NOTE
- Followed at [facility] by Dr. Lyon.  - Continue IVF and monitor electrolytes.  - At baseline.

## 2019-07-19 NOTE — NURSING
Received patient from [facility] ambulance.  Patient ambulated to bed without assistance.  Awake, alert, and oriented.  Answered questions appropriately.  Vitally stable.   Accucheck 132.  Laying in bed. HOB elevated 30 degrees. Shivering.  NGT hooked up to low continuous suction per patient's request.  Light pink output.  Bowel sounds positive. Ileostomy has small, soft output that is visibly in the pouch. Abdomen soft.  Patient report pain all over body and headache.  Notified Dr. Arauz. She said she will be there in a minute.

## 2019-07-19 NOTE — H&P
[facility] Hospital Medicine  History & Physical    Patient Name: Jarrett Charlton Jr.  MRN: [id number]  Admission Date: 7/19/2019  Attending Physician: Inez Arauz MD   Primary Care Provider: Poli Gonzalez MD         Patient information was obtained from patient, past medical records and ER records.     Subjective:     Principal Problem:Parastomal hernia with obstruction and without gangrene    Chief Complaint: No chief complaint on file.       HPI: Mr. Charlton is a 69 year old man with an ileostomy who presented to [facility] with nausea and abdominal pain since 12 pm.  He has had bowel obstructions in the past and states this feels the same.  Normally this is a high output ostomy but he has had no output since the pain started.  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  Patient's labs were at baseline.  NG tube was placed and the hernia was reduced by the ED physician.  He did start having output into the bag prior to being transferred to [facility] for further management.    Medical history includes ulcerative colitis with colectomy and end ileostomy done in 1985.  He has required revisions of the ileostomy and ultimately it was transposed to the right side due to parastomal and incisional hernias.  He has had intermittent problems with syncope due to dehydration from the high output ostomy and takes imodium and Pedialyte as needed as well as daily Citrucel.  He is followed by CRS at [facility].  History also includes diabetes (on metformin) gout, CKD II and HTN.  He had a TURP in 1/2019 and is under surveillance with regular PSAs.        Past Medical History:   Diagnosis Date    Basal cell carcinoma     BPH (benign prostatic hyperplasia)     s/p TURP    Carotid stenosis     Chronic kidney disease     Claudication     DDD (degenerative disc disease) 10/21/2013     Diabetes mellitus with renal complications     Disc disease, degenerative, cervical     GERD (gastroesophageal reflux disease)     Gout, chronic     History of ulcerative colitis     s/p colectomy and ileostomy    HLD (hyperlipidemia)     HTN (hypertension)     Ileostomy in place 1982    RBBB     Squamous cell carcinoma 03/08/2018    Left superior helix near insertion    Squamous cell carcinoma 04/12/2018    Left forearm x 5    Ventricular tachycardia        Past Surgical History:   Procedure Laterality Date    Jpdkczoyh-Hzpgxtllmis-Oi  11/10/2017    Performed by Kiko Al[facility]ado MD at [facility] CATH LAB    cardiac stents      CATARACT EXTRACTION Bilateral     CERVICAL FUSION      colectomy and ileostomy  1985    RAJENDRA-TRANSFORAMINAL N/A 6/4/2015    Performed by [facility] Diagnostic Provider at [facility] CATH LAB    RAJENDRA-TRANSFORAMINAL N/A 12/4/2013    Performed by [facility] Diagnostic Provider at [facility] CATH LAB    INJECTION, STEROID, SPINE, LUMBAR, EPIDURAL N/A 10/21/2013    Performed by [facility] Provider at [facility] CATH LAB    INJECTION-STEROID-EPIDURAL N/A 5/27/2014    Performed by [facility] Diagnostic Provider at [facility] CATH LAB    INJECTION-STEROID-EPIDURAL-LUMBAR N/A 11/30/2015    Performed by [facility] Diagnostic Provider at [facility] CATH LAB    INJECTION-STEROID-EPIDURAL-LUMBAR N/A 9/17/2014    Performed by [facility] Diagnostic Provider at [facility] CATH LAB    Left heart cath Left 11/10/2017    Performed by Kiko Alvarado MD at [facility] CATH LAB    Percutaneous coronary intervention  11/10/2017    Performed by Kiko Alvarado MD at [facility] CATH LAB    TURP, WITHOUT USING LASER BIPOLAR N/A 1/23/2019    Performed by Catarino Mota MD at [facility] OR [address] [place] [address]       Review of patient's allergies indicates:  No Known Allergies    Current Facility-Administered Medications on File Prior to Encounter   Medication    [COMPLETED] hydromorphone (PF) injection 1 mg    [COMPLETED] ondansetron injection 4 mg    [COMPLETED] ondansetron injection  8 mg    [COMPLETED] sodium chloride 0.9% bolus 1,000 mL     Current Outpatient Medications on File Prior to Encounter   Medication Sig    allopurinol (ZYLOPRIM) 300 MG tablet 1 1/2 - 2 a day    aspirin (ECOTRIN) 81 MG EC tablet Take 81 mg by mouth once daily.      ergocalciferol, vitamin D2, (VITAMIN D ORAL) Take by mouth.    gabapentin (NEURONTIN) 600 MG tablet Take 600 mg by mouth 2 (two) times daily.    metFORMIN (GLUCOPHAGE) 1000 MG tablet Take 1,000 mg by mouth 2 (two) times daily with meals.    metoprolol succinate (TOPROL XL) 25 MG 24 hr tablet Take 25 mg by mouth once daily.    pantoprazole (PROTONIX) 40 MG tablet Take 40 mg by mouth once daily.    pravastatin (PRAVACHOL) 40 MG tablet TAKE 1 TABLET EVERY DAY    BD ALCOHOL SWABS PadM     blood sugar diagnostic (ONE TOUCH ULTRA TEST) Strp USE ONE STRIP TO CHECK GLUCOSE EVERY DAY    clopidogrel (PLAVIX) 75 mg tablet Take 75 mg by mouth once daily.    methylPREDNISolone (MEDROL DOSEPACK) 4 mg tablet use as directed    promethazine-codeine 6.25-10 mg/5 ml (PHENERGAN WITH CODEINE) 6.25-10 mg/5 mL syrup Take 5 mLs by mouth every 6 (six) hours as needed for Cough.     Family History     None        Tobacco Use    Smoking status: Never Smoker    Smokeless tobacco: Never Used   Substance and Sexual Activity    Alcohol use: No    Drug use: No    Sexual activity: Not on file     Review of Systems   Constitutional: Negative for chills and fever.   HENT: Negative for rhinorrhea and sore throat.    Eyes: Negative for photophobia and visual disturbance.   Respiratory: Negative for cough and shortness of breath.    Cardiovascular: Negative for chest pain and palpitations.   Gastrointestinal: Positive for abdominal distention, abdominal pain, diarrhea, nausea and vomiting.   Genitourinary: Negative for dysuria and frequency.   Musculoskeletal: Negative for back pain and gait problem.   Neurological: Negative for weakness and headaches.    Psychiatric/Behavioral: Negative for confusion and dysphoric mood.     Objective:     Vital Signs (Most Recent):  Temp: 96.5 °F (35.8 °C) (07/19/19 0956)  Pulse: 65 (07/19/19 0956)  Resp: 16 (07/19/19 0956)  BP: (!) 139/93 (07/19/19 0956)  SpO2: 99 % (07/19/19 0956) Vital Signs (24h Range):  Temp:  [96.5 °F (35.8 °C)-97.8 °F (36.6 °C)] 96.5 °F (35.8 °C)  Pulse:  [65-80] 65  Resp:  [16-24] 16  SpO2:  [95 %-99 %] 99 %  BP: (134-170)/(87-97) 139/93     Weight: 90 kg (198 lb 6.6 oz)  Body mass index is 25.47 kg/m².    Physical Exam   Constitutional: He is oriented to person, place, and time. He appears well-developed and well-nourished.   HENT:   Head: Normocephalic.   Eyes: Pupils are equal, round, and reactive to light. Conjunctivae are normal.   Neck: Neck supple. No thyromegaly present.   Cardiovascular: Normal rate, regular rhythm, normal heart sounds and intact distal pulses. Exam reveals no gallop and no friction rub.   No murmur heard.  Pulmonary/Chest: Effort normal and breath sounds normal.   Abdominal: Soft. Bowel sounds are normal. He exhibits distension. There is no tenderness.   Old ostomy scar on left.  Current ostomy on the right with air and yellow watery stool in bag.  Appears clean and without signs of infection.  Midline vertical surgical scar.   Musculoskeletal: Normal range of motion. He exhibits no edema.   Lymphadenopathy:     He has no cervical adenopathy.   Neurological: He is alert and oriented to person, place, and time.   Strength equal and symmetric   Skin: Skin is warm and dry. No rash noted.   Psychiatric: He has a normal mood and affect. His behavior is normal. Thought content normal.         CRANIAL NERVES     CN III, IV, VI   Pupils are equal, round, and reactive to light.       Significant Labs: All pertinent labs within the past 24 hours have been reviewed.    Significant Imaging: I have reviewed all pertinent imaging results/findings within the past 24 hours.    Assessment/Plan:      * Parastomal hernia with obstruction and without gangrene  - Patient with bowel herniation into ostomy bag which obstructed flow of stool.  Reduced in ED at [facility] after NGT placement.  - Evaluated here by Dr. Anderson on arrival, stool and air noted in bag and NGT removed.  Recommended continuing IV fluids and starting ice chips.  Monitor closely with daily labs.  - Will need follow up with CRS at [facility] for attention to parastomal hernias.  Patient reports he has had 4 previous similar episodes.      Incisional hernia, without obstruction or gangrene  - History of additional incisional hernia involving previous stoma.  No current issues.      Type 2 diabetes mellitus with stage 2 chronic kidney disease, without long-term current use of insulin  - Patient on metformin, held.  - Continue accuchecks every 6 hours while NPO, low dose SSI      CKD (chronic kidney disease) stage 2, GFR 60-89 ml/min  - Followed at [facility] by Dr. Lyon.  - Continue IVF and monitor electrolytes.  - At baseline.      Bowel obstruction  - As above.      Idiopathic chronic gout of multiple sites with tophus  - Patient on allopurinol 300 mg daily, continue.      Essential hypertension  - Continue metoprolol.        VTE Risk Mitigation (From admission, onward)        Ordered     Place sequential compression device  Until discontinued      07/19/19 1018     Place KAREN hose  Until discontinued      07/19/19 1018     IP VTE HIGH RISK PATIENT  Once      07/19/19 1018     Reason for No Pharmacological VTE Prophylaxis  Once      07/19/19 1018             Inez Arreola MD  Department of Hospital Medicine   [facility]

## 2019-07-19 NOTE — PLAN OF CARE
07/19/19 1819   Discharge Assessment   Assessment Type Discharge Planning Assessment   Assessment information obtained from? Medical Record   Patient/Family in Agreement with Plan unable to assess

## 2019-07-20 VITALS
RESPIRATION RATE: 16 BRPM | BODY MASS INDEX: 25.47 KG/M2 | SYSTOLIC BLOOD PRESSURE: 154 MMHG | DIASTOLIC BLOOD PRESSURE: 80 MMHG | HEART RATE: 57 BPM | WEIGHT: 198.44 LBS | HEIGHT: 74 IN | OXYGEN SATURATION: 98 % | TEMPERATURE: 96 F

## 2019-07-20 LAB
ANION GAP SERPL CALC-SCNC: 9 MMOL/L (ref 8–16)
BUN SERPL-MCNC: 17 MG/DL (ref 8–23)
CALCIUM SERPL-MCNC: 8 MG/DL (ref 8.7–10.5)
CHLORIDE SERPL-SCNC: 109 MMOL/L (ref 95–110)
CO2 SERPL-SCNC: 26 MMOL/L (ref 23–29)
CREAT SERPL-MCNC: 1.3 MG/DL (ref 0.5–1.4)
EST. GFR  (AFRICAN AMERICAN): >60 ML/MIN/1.73 M^2
EST. GFR  (NON AFRICAN AMERICAN): 55 ML/MIN/1.73 M^2
GLUCOSE SERPL-MCNC: 96 MG/DL (ref 70–110)
MAGNESIUM SERPL-MCNC: 1.3 MG/DL (ref 1.6–2.6)
PHOSPHATE SERPL-MCNC: 2.5 MG/DL (ref 2.7–4.5)
POCT GLUCOSE: 90 MG/DL (ref 70–110)
POTASSIUM SERPL-SCNC: 4.1 MMOL/L (ref 3.5–5.1)
SODIUM SERPL-SCNC: 144 MMOL/L (ref 136–145)

## 2019-07-20 PROCEDURE — 99238 PR HOSPITAL DISCHARGE DAY,<30 MIN: ICD-10-PCS | Mod: ,,, | Performed by: HOSPITALIST

## 2019-07-20 PROCEDURE — 84100 ASSAY OF PHOSPHORUS: CPT

## 2019-07-20 PROCEDURE — 83735 ASSAY OF MAGNESIUM: CPT

## 2019-07-20 PROCEDURE — 36415 COLL VENOUS BLD VENIPUNCTURE: CPT

## 2019-07-20 PROCEDURE — 94761 N-INVAS EAR/PLS OXIMETRY MLT: CPT

## 2019-07-20 PROCEDURE — 80048 BASIC METABOLIC PNL TOTAL CA: CPT

## 2019-07-20 PROCEDURE — 25000003 PHARM REV CODE 250: Performed by: HOSPITALIST

## 2019-07-20 PROCEDURE — 99238 HOSP IP/OBS DSCHRG MGMT 30/<: CPT | Mod: ,,, | Performed by: HOSPITALIST

## 2019-07-20 RX ADMIN — METOPROLOL SUCCINATE 25 MG: 25 TABLET, EXTENDED RELEASE ORAL at 08:07

## 2019-07-20 RX ADMIN — ACETAMINOPHEN 650 MG: 325 TABLET, FILM COATED ORAL at 08:07

## 2019-07-20 RX ADMIN — SODIUM CHLORIDE: 0.9 INJECTION, SOLUTION INTRAVENOUS at 02:07

## 2019-07-20 RX ADMIN — ALLOPURINOL 300 MG: 100 TABLET ORAL at 08:07

## 2019-07-20 NOTE — HOSPITAL COURSE
Patient was seen by general surgery.  As he was starting to have good output from the ostomy the NG tube was discontinued and he was placed on ice chips and IV fluids.  He tolerated it well and abdominal pain resolved.  He did not want to try eating breakfast this morning, said he was recovered and would eat when he got home.  Dr. Anderson recommended he follow up for parastomal hernia repair.  He is established with CRS at [facility] and will follow up there.

## 2019-07-20 NOTE — NURSING
Patient is awake, alert, and oriented.  Vital signs are WNL.  Peripheral IV has been discontinued.  Patient is being discharge home with no services.  Follow-up appointments have been made.   Discharge instructions have been reviewed.  Patient verbalized understanding.  Patient is now laying in bed waiting for wheelchair assistance to private vehicle.

## 2019-07-20 NOTE — PROGRESS NOTES
Ostomy continues to be productive with liquid brown stool, patient manages independently. No acute events overnight, safety maintained, purposeful rounding done.

## 2019-07-20 NOTE — DISCHARGE SUMMARY
[facility] Hospital Medicine  Discharge Summary      Patient Name: Jarrett Charlton Jr.  MRN: [id number]  Admission Date: 7/19/2019  Hospital Length of Stay: 1 days  Discharge Date and Time:  07/20/2019 7:49 AM  Attending Physician: Inez Arauz MD   Discharging Provider: Inez Arauz MD  Primary Care Provider: Poli Gonzalez MD      HPI:   Mr. Charlton is a 69 year old man with an ileostomy who presented to [facility] with nausea and abdominal pain since 12 pm.  He has had bowel obstructions in the past and states this feels the same.  Normally this is a high output ostomy but he has had no output since the pain started.  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  Patient's labs were at baseline.  NG tube was placed and the hernia was reduced by the ED physician.  He did start having output into the bag prior to being transferred to [facility] for further management.    Medical history includes ulcerative colitis with colectomy and end ileostomy done in 1985.  He has required revisions of the ileostomy and ultimately it was transposed to the right side due to parastomal and incisional hernias.  He has had intermittent problems with syncope due to dehydration from the high output ostomy and takes imodium and Pedialyte as needed as well as daily Citrucel.  He is followed by CRS at [facility].  History also includes diabetes (on metformin) gout, CKD II and HTN.  He had a TURP in 1/2019 and is under surveillance with regular PSAs.              Hospital Course:   Patient was seen by general surgery.  As he was starting to have good output from the ostomy the NG tube was discontinued and he was placed on ice chips and IV fluids.  He tolerated it well and abdominal pain resolved.  He did not want to try eating breakfast this morning, said he was recovered and would eat when he  got home.  Dr. Anderson recommended he follow up for parastomal hernia repair.  He is established with CRS at [facility] and will follow up there.     Consults:   Consults (From admission, onward)        Status Ordering Provider     Inpatient consult to General Surgery  Once     Provider:  Gerber Anderson MD    Completed TADEO GRAFF            Final Active Diagnoses:    Diagnosis Date Noted POA    PRINCIPAL PROBLEM:  Parastomal hernia with obstruction and without gangrene [K43.3] 07/19/2019 Yes    Incisional hernia, without obstruction or gangrene [K43.2] 07/19/2019 Yes    Type 2 diabetes mellitus with stage 2 chronic kidney disease, without long-term current use of insulin [E11.22, N18.2]  Yes    CKD (chronic kidney disease) stage 2, GFR 60-89 ml/min [N18.2] 05/23/2013 Yes    Bowel obstruction [K56.609] 07/19/2019 Yes    Essential hypertension [I10]  Yes    Idiopathic chronic gout of multiple sites with tophus [M1A.09X1]  Yes      Problems Resolved During this Admission:       Discharged Condition: stable    Disposition: Home or Self Care    Follow Up:  Follow-up Information     Poli Gonzalez MD.    Specialties:  Internal Medicine, Pediatrics  Why:  Follow up in 1-2 weeks  Contact information:  [address] [place] [address] [phone]             Brandon Haley MD.    Specialty:  Colon and Rectal Surgery  Why:  Follow up for the next available appointment  Contact information:  [address] [place] [id number]  [phone]                 Patient Instructions:      Diet Adult Regular     Activity as tolerated         Medications:  Reconciled Home Medications:      Medication List      CONTINUE taking these medications    allopurinol 300 MG tablet  Commonly known as:  ZYLOPRIM  1 1/2 - 2 a day     aspirin 81 MG EC tablet  Commonly known as:  ECOTRIN  Take 81 mg by mouth once daily.     BD ALCOHOL SWABS Padm  Generic drug:  alcohol swabs     blood  sugar diagnostic Strp  Commonly known as:  ONETOUCH ULTRA TEST  USE ONE STRIP TO CHECK GLUCOSE EVERY DAY     gabapentin 600 MG tablet  Commonly known as:  NEURONTIN  Take 600 mg by mouth 2 (two) times daily.     metFORMIN 1000 MG tablet  Commonly known as:  GLUCOPHAGE  Take 1,000 mg by mouth 2 (two) times daily with meals.     pantoprazole 40 MG tablet  Commonly known as:  PROTONIX  Take 40 mg by mouth once daily.     pravastatin 40 MG tablet  Commonly known as:  PRAVACHOL  TAKE 1 TABLET EVERY DAY     TOPROL XL 25 MG 24 hr tablet  Generic drug:  metoprolol succinate  Take 25 mg by mouth once daily.     VITAMIN D ORAL  Take by mouth.            Indwelling Lines/Drains at time of discharge:   Lines/Drains/Airways     Drain                 Ileostomy 01/07/19 1101  days                Time spent on the discharge of patient: <30 minutes  Patient was seen and examined on the date of discharge and determined to be suitable for discharge.         Inez Arreola MD  Department of Hospital Medicine  [facility]

## 2019-07-22 ENCOUNTER — TELEPHONE (OUTPATIENT)
Dept: PRIMARY CARE CLINIC | Facility: CLINIC | Age: 69
End: 2019-07-22

## 2019-07-22 NOTE — TELEPHONE ENCOUNTER
----- Message from Jolie Carpio sent at 7/22/2019  2:57 PM CDT -----  [facility]: [facility]  Paula faxed over a request for patients his ostomy supplies on 7/19 and would like confirmation of receipt or at least fax back over the prescription.  Call Paula back at  [phone] or just fax over to [phone].  Thank you!

## 2019-07-23 ENCOUNTER — OFFICE VISIT (OUTPATIENT)
Dept: PRIMARY CARE CLINIC | Facility: CLINIC | Age: 69
End: 2019-07-23
Payer: MEDICARE

## 2019-07-23 VITALS
HEART RATE: 73 BPM | OXYGEN SATURATION: 96 % | SYSTOLIC BLOOD PRESSURE: 146 MMHG | HEIGHT: 74 IN | RESPIRATION RATE: 18 BRPM | BODY MASS INDEX: 26.05 KG/M2 | TEMPERATURE: 99 F | DIASTOLIC BLOOD PRESSURE: 84 MMHG | WEIGHT: 203 LBS

## 2019-07-23 DIAGNOSIS — N30.01 ACUTE CYSTITIS WITH HEMATURIA: ICD-10-CM

## 2019-07-23 DIAGNOSIS — I10 ESSENTIAL HYPERTENSION: ICD-10-CM

## 2019-07-23 DIAGNOSIS — M54.50 ACUTE LEFT-SIDED LOW BACK PAIN WITHOUT SCIATICA: Primary | ICD-10-CM

## 2019-07-23 PROCEDURE — 3079F DIAST BP 80-89 MM HG: CPT | Mod: CPTII,S$GLB,, | Performed by: INTERNAL MEDICINE

## 2019-07-23 PROCEDURE — 1101F PR PT FALLS ASSESS DOC 0-1 FALLS W/OUT INJ PAST YR: ICD-10-PCS | Mod: CPTII,S$GLB,, | Performed by: INTERNAL MEDICINE

## 2019-07-23 PROCEDURE — 81002 POCT URINE DIPSTICK WITHOUT MICROSCOPE: ICD-10-PCS | Mod: S$GLB,,, | Performed by: INTERNAL MEDICINE

## 2019-07-23 PROCEDURE — 1101F PT FALLS ASSESS-DOCD LE1/YR: CPT | Mod: CPTII,S$GLB,, | Performed by: INTERNAL MEDICINE

## 2019-07-23 PROCEDURE — 96372 THER/PROPH/DIAG INJ SC/IM: CPT | Mod: S$GLB,,, | Performed by: INTERNAL MEDICINE

## 2019-07-23 PROCEDURE — 3077F SYST BP >= 140 MM HG: CPT | Mod: CPTII,S$GLB,, | Performed by: INTERNAL MEDICINE

## 2019-07-23 PROCEDURE — 99999 PR PBB SHADOW E&M-EST. PATIENT-LVL IV: ICD-10-PCS | Mod: PBBFAC,,, | Performed by: INTERNAL MEDICINE

## 2019-07-23 PROCEDURE — 81002 URINALYSIS NONAUTO W/O SCOPE: CPT | Mod: S$GLB,,, | Performed by: INTERNAL MEDICINE

## 2019-07-23 PROCEDURE — 3079F PR MOST RECENT DIASTOLIC BLOOD PRESSURE 80-89 MM HG: ICD-10-PCS | Mod: CPTII,S$GLB,, | Performed by: INTERNAL MEDICINE

## 2019-07-23 PROCEDURE — 99213 OFFICE O/P EST LOW 20 MIN: CPT | Mod: 25,S$GLB,, | Performed by: INTERNAL MEDICINE

## 2019-07-23 PROCEDURE — 99213 PR OFFICE/OUTPT VISIT, EST, LEVL III, 20-29 MIN: ICD-10-PCS | Mod: 25,S$GLB,, | Performed by: INTERNAL MEDICINE

## 2019-07-23 PROCEDURE — 96372 PR INJECTION,THERAP/PROPH/DIAG2ST, IM OR SUBCUT: ICD-10-PCS | Mod: S$GLB,,, | Performed by: INTERNAL MEDICINE

## 2019-07-23 PROCEDURE — 3077F PR MOST RECENT SYSTOLIC BLOOD PRESSURE >= 140 MM HG: ICD-10-PCS | Mod: CPTII,S$GLB,, | Performed by: INTERNAL MEDICINE

## 2019-07-23 PROCEDURE — 99999 PR PBB SHADOW E&M-EST. PATIENT-LVL IV: CPT | Mod: PBBFAC,,, | Performed by: INTERNAL MEDICINE

## 2019-07-23 RX ORDER — MELOXICAM 7.5 MG/1
7.5 TABLET ORAL DAILY
Qty: 30 TABLET | Refills: 0 | Status: SHIPPED | OUTPATIENT
Start: 2019-07-23 | End: 2019-08-02

## 2019-07-23 RX ORDER — KETOROLAC TROMETHAMINE 30 MG/ML
30 INJECTION, SOLUTION INTRAMUSCULAR; INTRAVENOUS ONCE
Status: COMPLETED | OUTPATIENT
Start: 2019-07-23 | End: 2019-07-23

## 2019-07-23 RX ORDER — TIZANIDINE 4 MG/1
4 TABLET ORAL 2 TIMES DAILY PRN
Qty: 30 TABLET | Refills: 0 | Status: SHIPPED | OUTPATIENT
Start: 2019-07-23 | End: 2019-08-02

## 2019-07-23 RX ORDER — TRIAMCINOLONE ACETONIDE 40 MG/ML
80 INJECTION, SUSPENSION INTRA-ARTICULAR; INTRAMUSCULAR ONCE
Status: COMPLETED | OUTPATIENT
Start: 2019-07-23 | End: 2019-07-23

## 2019-07-23 RX ORDER — HYDROCODONE BITARTRATE AND ACETAMINOPHEN 5; 325 MG/1; MG/1
1 TABLET ORAL EVERY 12 HOURS PRN
Qty: 20 TABLET | Refills: 0 | Status: SHIPPED | OUTPATIENT
Start: 2019-07-23 | End: 2019-08-02

## 2019-07-23 RX ADMIN — KETOROLAC TROMETHAMINE 30 MG: 30 INJECTION, SOLUTION INTRAMUSCULAR; INTRAVENOUS at 02:07

## 2019-07-23 RX ADMIN — TRIAMCINOLONE ACETONIDE 80 MG: 40 INJECTION, SUSPENSION INTRA-ARTICULAR; INTRAMUSCULAR at 02:07

## 2019-07-23 NOTE — PROGRESS NOTES
Subjective:       Patient ID: Jarrett Charlton Jr. is a 69 y.o. male.    Chief Complaint: Low-back Pain    HPI  Pt si here today c/o sever back painonleft flank 5 days getting wofse no trfaum no fall does have h/o LS disc ds in the past no hematuris dysuria leg weakness  Review of Systems    Objective:      Physical Exam   Constitutional: He is oriented to person, place, and time. He appears well-developed and well-nourished. No distress.   HENT:   Head: Normocephalic and atraumatic.   Right Ear: External ear normal.   Left Ear: External ear normal.   Nose: Nose normal.   Mouth/Throat: Oropharynx is clear and moist. No oropharyngeal exudate.   Eyes: Pupils are equal, round, and reactive to light. Conjunctivae and EOM are normal. Right eye exhibits no discharge. Left eye exhibits no discharge.   Neck: Normal range of motion. Neck supple. No thyromegaly present.   Cardiovascular: Normal rate, regular rhythm, normal heart sounds and intact distal pulses. Exam reveals no gallop and no friction rub.   No murmur heard.  Pulmonary/Chest: Effort normal and breath sounds normal. No respiratory distress. He has no wheezes. He has no rales. He exhibits no tenderness.   Abdominal: Soft. Bowel sounds are normal. He exhibits no distension. There is no tenderness. There is no rebound and no guarding.   Colostomy functiiong well   Musculoskeletal: Normal range of motion. He exhibits tenderness (tenderess with palpation left lowe back left flank). He exhibits no edema or deformity.   Lymphadenopathy:     He has no cervical adenopathy.   Neurological: He is alert and oriented to person, place, and time.   Skin: Skin is warm and dry. Capillary refill takes less than 2 seconds. No rash noted. No erythema.   Psychiatric: He has a normal mood and affect. Judgment and thought content normal.   Nursing note and vitals reviewed.      Assessment:       1. Acute left-sided low back pain without sciatica    2. Essential hypertension         Plan:       Acute left-sided low back pain without sciatica  -     triamcinolone acetonide injection 80 mg  -     ketorolac injection 30 mg  -     HYDROcodone-acetaminophen (NORCO) 5-325 mg per tablet; Take 1 tablet by mouth every 12 (twelve) hours as needed.  Dispense: 20 tablet; Refill: 0  -     tiZANidine (ZANAFLEX) 4 MG tablet; Take 1 tablet (4 mg total) by mouth 2 (two) times daily as needed.  Dispense: 30 tablet; Refill: 0  -     meloxicam (MOBIC) 7.5 MG tablet; Take 1 tablet (7.5 mg total) by mouth once daily. For arthritis  Dispense: 30 tablet; Refill: 0    Essential hypertension  Comments:  stable on medications

## 2019-07-23 NOTE — PROGRESS NOTES
Verified pt ID using name and . NKDA. Administered 80 mg kenalog in right VG and 30 mg ketorolac in left VG per physician order using aseptic technique. Aspirated and no blood return noted. Pt tolerated well with no adverse reactions noted.

## 2019-07-25 ENCOUNTER — HOSPITAL ENCOUNTER (OUTPATIENT)
Dept: VASCULAR SURGERY | Facility: CLINIC | Age: 69
Discharge: HOME | End: 2019-07-25
Attending: SURGERY
Payer: MEDICARE

## 2019-07-25 ENCOUNTER — TELEPHONE (OUTPATIENT)
Dept: PRIMARY CARE CLINIC | Facility: CLINIC | Age: 69
End: 2019-07-25

## 2019-07-25 DIAGNOSIS — M79.609 PAIN IN EXTREMITY, UNSPECIFIED EXTREMITY: ICD-10-CM

## 2019-07-25 DIAGNOSIS — M79.606 LEG PAIN: ICD-10-CM

## 2019-07-25 PROCEDURE — 93923 PR NON-INVASIVE PHYSIOLOGIC STUDY EXTREMITY 3 LEVELS: ICD-10-PCS | Mod: S$GLB,,, | Performed by: SURGERY

## 2019-07-25 PROCEDURE — 93923 UPR/LXTR ART STDY 3+ LVLS: CPT | Mod: S$GLB,,, | Performed by: SURGERY

## 2019-07-25 NOTE — TELEPHONE ENCOUNTER
Patient calling for urine results that are not entered. He says he did the urine when he was here in the office

## 2019-07-25 NOTE — TELEPHONE ENCOUNTER
----- Message from Blanca Carrillo sent at 7/25/2019  2:24 PM CDT -----  Type:  Test Results    Who Called:  patient  Name of Test (Lab/Mammo/Etc):  Urine results  Date of Test:  Tuesday 07 23 19  Ordering Provider:  Dr Gonzalez  Where the test was performed:  [facility]  Best Call Back Number:  [phone]  Additional Information:  Calling for his urine results/please call

## 2019-07-26 LAB
BILIRUB SERPL-MCNC: NEGATIVE MG/DL
BLOOD URINE, POC: NEGATIVE
COLOR, POC UA: NORMAL
GLUCOSE UR QL STRIP: NEGATIVE
KETONES UR QL STRIP: NEGATIVE
LEUKOCYTE ESTERASE URINE, POC: NEGATIVE
NITRITE, POC UA: NEGATIVE
PH, POC UA: 5
PROTEIN, POC: NEGATIVE
SPECIFIC GRAVITY, POC UA: 1.02
UROBILINOGEN, POC UA: NEGATIVE

## 2019-07-29 RX ORDER — GABAPENTIN 600 MG/1
600 TABLET ORAL 2 TIMES DAILY
Qty: 180 TABLET | Refills: 2 | Status: ON HOLD | OUTPATIENT
Start: 2019-07-29 | End: 2019-12-31 | Stop reason: SDUPTHER

## 2019-08-01 NOTE — PROGRESS NOTES
"CRS Office Visit Followup    Referring Md:   No referring provider defined for this encounter.    SUBJECTIVE:     Chief Complaint: ileostomy problems    History of Present Illness:  The patient is an established patient to this practice.   Course is as follows:  Patient is a 69 y.o. male presents with ileostomy dysfunction  History of ulcerative colitis s/p total proctocolectomy with end ileostomy (1985)  He has required revisions of the ileostomy and ultimately it was transposed to the right side due to parastomal and incisional hernias.  He has had intermittent problems with syncope due to dehydration from the high output ostomy and takes imodium and Pedialyte as needed as well as daily Citrucel.  History also includes diabetes (on metformin) gout, CKD II and HTN.  He had a TURP in 1/2019 and is under surveillance with regular PSAs.    1/29/19: He has been doing well for multiple years.  Over the past 6-8 years, he has had intermittent episodes of dehydration and syncope.  This is 2-3 times per year.  He empties his bag 6-7 times per day.  Changes is pouch every 3-4 days.  No issues with his perineal incision. Overall feels well and is active.  Syncopal episodes require ED transfer and frequent hospital admission.    - started on imodium    2/26/19: Has taken imodium intermittently when stoma output is high but has not taken it scheduled. Thinks the imodium cuts his output about in half but has not taken exact measurements. Still empties his bag 6-8 times a day, he estimates about 500cc per empty. Had an episode of dehydration/near syncope earlier in the month. Also notes intermittent episodes of being "stopped up" associated with RLQ pain lasting up to 24h. He treats this by drinking large volumes of Pedialyte which eventually return of stoma output. Currently feels well, no lightheadedness or dizziness at this time.    8/2/19:  Admitted for bowel obstruction secondary to incarcerated peristomal hernia on " "07/19/2019.  This was reduced in the ER with return of bowel function.   In regards to his diarrhea, he overall feels substantially improved by taking fiber and having decreased output.  Regarding his parastomal hernia, he has never had complaints or recollection of a incarcerated peristomal in the past.  He has had intermittent bowel obstructions they have all been secondary to adhesive disease.  He denies any pain around the stoma.  He is very concerned that revision of the ostomy may result in worse adhesion of the pouch.  He has previously had a left-sided ileostomy that was flat with the scan and did not pouch well.    Review of Systems:  Review of Systems   Constitutional: Positive for malaise/fatigue. Negative for chills, diaphoresis, fever and weight loss.   HENT: Negative for congestion.    Respiratory: Negative for shortness of breath.    Cardiovascular: Negative for chest pain and leg swelling.   Gastrointestinal: Positive for diarrhea. Negative for abdominal pain, blood in stool, constipation, nausea and vomiting.   Genitourinary: Negative for dysuria.   Musculoskeletal: Negative for back pain and myalgias.   Skin: Negative for rash.   Neurological: Positive for sensory change. Negative for dizziness and weakness.   Endo/Heme/Allergies: Does not bruise/bleed easily.   Psychiatric/Behavioral: Negative for depression.       OBJECTIVE:     Vital Signs (Most Recent)  BP (!) 152/97 (BP Location: Left arm, Patient Position: Sitting, BP Method: Large (Automatic))   Pulse 79   Ht 6' 2" (1.88 m)   Wt 93 kg (205 lb)   BMI 26.32 kg/m²     Physical Exam:  General: White male in no distress   Neuro: alert and oriented x 4.  Moves all extremities.     HEENT: no icterus.  Trachea midline  Respiratory: respirations are even and unlabored  Cardiac: regular rate  Abdomen:  Midline laparotomy is well-healed.  Prior ileostomy site in the left upper quadrant is also well healed.  No hernia on the left upper quadrant. " Ileostomy currently sits in the right upper quadrant is pink protrudes above the level of the skin. No significant peristomal hernia can be palpated.  Extremities: Warm dry and intact  Skin: no rashes  Anorectal:  Deferred    Labs: H/H = 14/42.  Cr = 1.4.  Albumin of 4.2    Imaging:  CT from 07/19/2019 personally reviewed and demonstrates parastomal hernia in the right lower quadrant colostomy showing small-bowel obstruction/strangulation with dilatation proximally to the loop and small bowel fecalization    ASSESSMENT/PLAN:     Jarrett was seen today for para stomal hernia.    Diagnoses and all orders for this visit:    Presence of ileostomy    SBO (small bowel obstruction)    Parastomal hernia with obstruction and without gangrene        68-year-old gentleman with ulcerative colitis status post total procto colectomy and end ileostomy in 1985 presents with high-output ileostomy as well as recurrent small-bowel obstructions with a least 1 episode secondary to parastomal hernia.  Today we discussed the following 2 options  1) open parastomal hernia or repair with SugarBaker mesh underlay.  This would repair the parastomal hernia prevent further bowel from being incarcerated by the stoma.  This would potentially change the configuration of the stoma and may result in poor pouching.  We discussed that this would be a 3-5 day hospital stay.  This would involve a midline laparotomy given his multiple prior surgeries as well as lysis of adhesions.  2) close observation.  If he were to have recurrent bowel obstruction secondary to a parastomal hernia, repair would be required.  However, he has only had 1 episode documented peristomal hernia.  This has not happened since that time.  Close observation was recommended.  If he has recurrent obstructions, he will let me know and we will proceed with open [place] hernia repair as well as lysis of adhesions.      CED Haley MD  Staff Surgeon  Colon & Rectal  Surgery

## 2019-08-02 ENCOUNTER — OFFICE VISIT (OUTPATIENT)
Dept: VASCULAR SURGERY | Facility: CLINIC | Age: 69
End: 2019-08-02
Attending: SURGERY
Payer: MEDICARE

## 2019-08-02 ENCOUNTER — OFFICE VISIT (OUTPATIENT)
Dept: SURGERY | Facility: CLINIC | Age: 69
End: 2019-08-02
Payer: MEDICARE

## 2019-08-02 VITALS
BODY MASS INDEX: 26.31 KG/M2 | DIASTOLIC BLOOD PRESSURE: 97 MMHG | SYSTOLIC BLOOD PRESSURE: 152 MMHG | HEART RATE: 79 BPM | WEIGHT: 205 LBS | HEIGHT: 74 IN

## 2019-08-02 VITALS
WEIGHT: 205 LBS | HEIGHT: 74 IN | SYSTOLIC BLOOD PRESSURE: 159 MMHG | HEART RATE: 74 BPM | BODY MASS INDEX: 26.31 KG/M2 | TEMPERATURE: 98 F | DIASTOLIC BLOOD PRESSURE: 95 MMHG

## 2019-08-02 DIAGNOSIS — K43.3 PARASTOMAL HERNIA WITH OBSTRUCTION AND WITHOUT GANGRENE: ICD-10-CM

## 2019-08-02 DIAGNOSIS — K56.609 SBO (SMALL BOWEL OBSTRUCTION): ICD-10-CM

## 2019-08-02 DIAGNOSIS — Z93.2 PRESENCE OF ILEOSTOMY: Primary | ICD-10-CM

## 2019-08-02 DIAGNOSIS — L97.511 SKIN ULCER OF TOE OF RIGHT FOOT, LIMITED TO BREAKDOWN OF SKIN: Primary | ICD-10-CM

## 2019-08-02 PROCEDURE — 99214 PR OFFICE/OUTPT VISIT, EST, LEVL IV, 30-39 MIN: ICD-10-PCS | Mod: S$GLB,,, | Performed by: COLON & RECTAL SURGERY

## 2019-08-02 PROCEDURE — 3080F PR MOST RECENT DIASTOLIC BLOOD PRESSURE >= 90 MM HG: ICD-10-PCS | Mod: CPTII,S$GLB,, | Performed by: COLON & RECTAL SURGERY

## 2019-08-02 PROCEDURE — 99999 PR PBB SHADOW E&M-EST. PATIENT-LVL III: ICD-10-PCS | Mod: PBBFAC,,, | Performed by: SURGERY

## 2019-08-02 PROCEDURE — 3080F PR MOST RECENT DIASTOLIC BLOOD PRESSURE >= 90 MM HG: ICD-10-PCS | Mod: CPTII,S$GLB,, | Performed by: SURGERY

## 2019-08-02 PROCEDURE — 3080F DIAST BP >= 90 MM HG: CPT | Mod: CPTII,S$GLB,, | Performed by: SURGERY

## 2019-08-02 PROCEDURE — 99204 PR OFFICE/OUTPT VISIT, NEW, LEVL IV, 45-59 MIN: ICD-10-PCS | Mod: S$GLB,,, | Performed by: SURGERY

## 2019-08-02 PROCEDURE — 99204 OFFICE O/P NEW MOD 45 MIN: CPT | Mod: S$GLB,,, | Performed by: SURGERY

## 2019-08-02 PROCEDURE — 3077F PR MOST RECENT SYSTOLIC BLOOD PRESSURE >= 140 MM HG: ICD-10-PCS | Mod: CPTII,S$GLB,, | Performed by: SURGERY

## 2019-08-02 PROCEDURE — 99999 PR PBB SHADOW E&M-EST. PATIENT-LVL III: CPT | Mod: PBBFAC,,, | Performed by: COLON & RECTAL SURGERY

## 2019-08-02 PROCEDURE — 3077F PR MOST RECENT SYSTOLIC BLOOD PRESSURE >= 140 MM HG: ICD-10-PCS | Mod: CPTII,S$GLB,, | Performed by: COLON & RECTAL SURGERY

## 2019-08-02 PROCEDURE — 3077F SYST BP >= 140 MM HG: CPT | Mod: CPTII,S$GLB,, | Performed by: COLON & RECTAL SURGERY

## 2019-08-02 PROCEDURE — 99214 OFFICE O/P EST MOD 30 MIN: CPT | Mod: S$GLB,,, | Performed by: COLON & RECTAL SURGERY

## 2019-08-02 PROCEDURE — 1101F PT FALLS ASSESS-DOCD LE1/YR: CPT | Mod: CPTII,S$GLB,, | Performed by: COLON & RECTAL SURGERY

## 2019-08-02 PROCEDURE — 1101F PR PT FALLS ASSESS DOC 0-1 FALLS W/OUT INJ PAST YR: ICD-10-PCS | Mod: CPTII,S$GLB,, | Performed by: SURGERY

## 2019-08-02 PROCEDURE — 1101F PT FALLS ASSESS-DOCD LE1/YR: CPT | Mod: CPTII,S$GLB,, | Performed by: SURGERY

## 2019-08-02 PROCEDURE — 99999 PR PBB SHADOW E&M-EST. PATIENT-LVL III: ICD-10-PCS | Mod: PBBFAC,,, | Performed by: COLON & RECTAL SURGERY

## 2019-08-02 PROCEDURE — 99999 PR PBB SHADOW E&M-EST. PATIENT-LVL III: CPT | Mod: PBBFAC,,, | Performed by: SURGERY

## 2019-08-02 PROCEDURE — 3080F DIAST BP >= 90 MM HG: CPT | Mod: CPTII,S$GLB,, | Performed by: COLON & RECTAL SURGERY

## 2019-08-02 PROCEDURE — 3077F SYST BP >= 140 MM HG: CPT | Mod: CPTII,S$GLB,, | Performed by: SURGERY

## 2019-08-02 PROCEDURE — 1101F PR PT FALLS ASSESS DOC 0-1 FALLS W/OUT INJ PAST YR: ICD-10-PCS | Mod: CPTII,S$GLB,, | Performed by: COLON & RECTAL SURGERY

## 2019-08-02 NOTE — PROGRESS NOTES
Jarrett Charlton   08/02/2019    HPI:  Patient is a 69 y.o. male with a h/o HTN, HDL, DM who is here today for evaluation of discoloration of R 2nd toe. In early July, he noticed that he noticed one morning that he had discoloration of his purple/black discoloration of his R 2nd toe. Two days after this event his R 4th toe became discolored. He has had pain in toes on bilateral feet for about 6 weeks, the pain occurs when he is wearing shoes. The discoloration has almost entirely resolved at this point, except for a small area on his right toe. The discoloration lasted 8-9 days, and then gradually got better. He notes that he previously wore compression socks daily and now wears them intermittently. He was told by a cardiologist that he could wear them, but denies a specific diagnosis require it nor any chronic swelling.   Denies MI/stroke  Tobacco use: never    Past Medical History:   Diagnosis Date    Basal cell carcinoma     BPH (benign prostatic hyperplasia)     s/p TURP    Carotid stenosis     Chronic kidney disease     Claudication     DDD (degenerative disc disease) 10/21/2013    Diabetes mellitus with renal complications     Disc disease, degenerative, cervical     GERD (gastroesophageal reflux disease)     Gout, chronic     History of ulcerative colitis     s/p colectomy and ileostomy    HLD (hyperlipidemia)     HTN (hypertension)     Ileostomy in place 1982    RBBB     Squamous cell carcinoma 03/08/2018    Left superior helix near insertion    Squamous cell carcinoma 04/12/2018    Left forearm x 5    Ventricular tachycardia      Past Surgical History:   Procedure Laterality Date    Vjlasirhm-Itpdkaptygw-Jo  11/10/2017    Performed by Kiko [facility]ado [facility] at [facility] CATH LAB    cardiac stents      CATARACT EXTRACTION Bilateral     CERVICAL FUSION      colectomy and ileostomy  1985    RAJENDRA-TRANSFORAMINAL N/A 6/4/2015    Performed by [facility] CATH LAB     RAJENDRA-TRANSFORAMINAL N/A 12/4/2013    Performed by [facility] CATH LAB    INJECTION, STEROID, SPINE, LUMBAR, EPIDURAL N/A 10/21/2013    Performed by [facility] CATH LAB    INJECTION-STEROID-EPIDURAL N/A 5/27/2014    Performed by [facility] CATH LAB    INJECTION-STEROID-EPIDURAL-LUMBAR N/A 11/30/2015    Performed by [facility] CATH LAB    INJECTION-STEROID-EPIDURAL-LUMBAR N/A 9/17/2014    Performed by [facility] CATH LAB    Left heart cath Left 11/10/2017    Per[facility] by [facility] at [facility] CATH LAB    Percutaneous coronary intervention  11/10/2017    Performed by [facility] at [facility] CATH LAB    TURP, WITHOUT USING LASER BIPOLAR N/A 1/23/2019    Performed by [facility]ino [facility]ota [facility] at [facility] OR [address] [place] [address]     Family History   Problem Relation Age of Onset    Melanoma Neg Hx     Hypertension Neg Hx     Diabetes Neg Hx     Arthritis Neg Hx      Social History     Socioeconomic History    Marital status:      Spouse name: Not on file    Number of children: 1    Years of education: Not on file    Highest education level: Not on file   Occupational History    Occupation:  x 44 years     Comment: Retired    Occupation: [place]    Social Needs    Financial resource strain: Not on file    Food insecurity:     Worry: Not on file     Inability: Not on file    Transportation needs:     Medical: Not on file     Non-medical: Not on file   Tobacco Use    Smoking status: Never Smoker    Smokeless tobacco: Never Used   Substance and Sexual Activity    Alcohol use: No    Drug use: No    Sexual activity: Not on file   Lifestyle    Physical activity:     Days per week: Not on file     Minutes per session: Not on file    Stress: Not on file   Relationships    Social connections:     Talks on phone: Not on file     Gets together: Not on file     Attends  Nondenominational service: Not on file     Active member of club or organization: Not on file     Attends meetings of clubs or organizations: Not on file     Relationship status: Not on file   Other Topics Concern    Not on file   Social History Narrative    Not on file       Current Outpatient Medications:     allopurinol (ZYLOPRIM) 300 MG tablet, 1 1/2 - 2 a day, Disp: , Rfl:     aspirin (ECOTRIN) 81 MG EC tablet, Take 81 mg by mouth once daily.  , Disp: , Rfl:     BD ALCOHOL SWABS PadM, , Disp: , Rfl:     blood sugar diagnostic (ONE TOUCH ULTRA TEST) Str, USE ONE STRIP TO CHECK GLUCOSE EVERY DAY, Disp: 100 each, Rfl: 11    ergocalciferol, vitamin D2, (VITAMIN D ORAL), Take by mouth., Disp: , Rfl:     gabapentin (NEURONTIN) 600 MG tablet, Take 1 tablet (600 mg total) by mouth 2 (two) times daily., Disp: 180 tablet, Rfl: 2    HYDROcodone-acetaminophen (NORCO) 5-325 mg per tablet, Take 1 tablet by mouth every 12 (twelve) hours as needed., Disp: 20 tablet, Rfl: 0    meloxicam (MOBIC) 7.5 MG tablet, Take 1 tablet (7.5 mg total) by mouth once daily. For arthritis, Disp: 30 tablet, Rfl: 0    metFORMIN (GLUCOPHAGE) 1000 MG tablet, Take 1,000 mg by mouth 2 (two) times daily with meals., Disp: , Rfl:     metoprolol succinate (TOPROL XL) 25 MG 24 hr tablet, Take 25 mg by mouth once daily., Disp: , Rfl:     pantoprazole (PROTONIX) 40 MG tablet, Take 40 mg by mouth once daily., Disp: , Rfl:     pravastatin (PRAVACHOL) 40 MG tablet, TAKE 1 TABLET EVERY DAY, Disp: 90 tablet, Rfl: 3    tiZANidine (ZANAFLEX) 4 MG tablet, Take 1 tablet (4 mg total) by mouth 2 (two) times daily as needed., Disp: 30 tablet, Rfl: 0    REVIEW OF SYSTEMS:  General: negative; ENT: negative; Allergy and Immunology: negative; Hematological and Lymphatic: Negative; Endocrine: negative; Respiratory: no cough, shortness of breath, or wheezing; Cardiovascular: no chest pain or dyspnea on exertion; Gastrointestinal: no abdominal pain/back, change in  bowel habits, or bloody stools; Genito-Urinary: no dysuria, trouble voiding, or hematuria; Musculoskeletal: positive for discoloration of R 2nd and 4th toes, pain in bilateral toes 1-5  Neurological: no TIA or stroke symptoms; Psychiatric: no nervousness, anxiety or depression.    PHYSICAL EXAM:   Right Arm BP - Sittin/96 (19 1321)  Left Arm BP - Sittin/95 (19 1321)  Pulse: 74  Temp: 98.2 °F (36.8 °C)      General appearance:  Alert, well-appearing, and in no distress.  Oriented to person, place, and time   Neurological: Normal speech, no focal findings noted; CN II - XII grossly intact           Musculoskeletal: Digits/nail without cyanosis/clubbing.  Normal muscle strength/tone.                 Neck: Supple, no significant adenopathy; thyroid is not enlarged                  Trace R carotid bruit can be auscultated                Chest:  Clear to auscultation, no wheezes, rales or rhonchi, symmetric air entry     No use of accessory muscles             Cardiac: Normal rate and regular rhythm, S1 and S2 normal; PMI non-displaced          Abdomen: Soft, nontender, nondistended, no masses or organomegaly     No rebound tenderness noted; bowel sounds normal     Pulsatile aortic mass is not palpable.     No groin adenopathy      Extremities:   2+ femoral pulses bilaterally     2+ pedal pulses palpable bilaterally     No pre-tibial edema     No ulcerations    LAB RESULTS:  Lab Results   Component Value Date    K 4.1 2019    K 3.5 2019    K 3.9 2019    CREATININE 1.3 2019    CREATININE 1.4 2019    CREATININE 1.3 2019     Lab Results   Component Value Date    WBC 9.10 2019    WBC 5.40 2019    WBC 9.60 2019    HCT 44.5 2019    HCT 40.6 2019    HCT 44.8 2019     2019     (L) 2019     2019     Lab Results   Component Value Date    HGBA1C 6.6 (H) 2019    HGBA1C 6.6 (H) 2019     HGBA1C 6.6 (H) 06/27/2018     IMAGING:  VAS US ABIs 7/25/19:   Results:  Lower Extremities Segmental Pressure [mmHg]:                    Right             Left  _______________________________________________________________  Brachial          148               148  Low Thigh         216               186  Calf              180               183  Posterior Tibial  185               187  Dorsalis Pedis    191               182  SAKSHI (Post. Tib.)  1.25              1.26  SAKSHI (Dors. Ped.)  1.29              1.23    Report Summary:  Impression:   Right leg (SAKSHI: 1.29): Segmental pressures and PVR waveforms do not suggest peripheral arterial occlusive disease.     Right leg (SAKSHI: 1.26): Segmental pressures and PVR waveforms do not suggest peripheral arterial occlusive disease.      IMP/PLAN:  69 y.o. male with PMHx of HTN, HL, DM who presents for evaluation of discoloration of his R 2nd toe. He notes the discoloration has resolved at this point. ABIs are not consistent with PAOD and he has palpable DP pulses bilaterally.      1) continue asa, statin  2) RTC prn     Ed Flood MD   Vascular/Endovascular Surgery

## 2019-08-02 NOTE — LETTER
August 5, 2019      Gareth Baca, DPÁNGEL  [address] [facility] [address]           [facility]  [address] [facility] [address]  [facility] [address]  Phone: [phone]  Fax: [phone]          Patient: Jarrett Charlton Jr.   MR Number: [id number]   YOB: 1950   Date of Visit: 8/2/2019       Dear Dr. Gareth Baca:    Thank you for referring Jarrett Charlton to me for evaluation. Attached you will find relevant portions of my assessment and plan of care.    If you have questions, please do not hesitate to call me. I look forward to following Jarrett Charlton along with you.    Sincerely,    Ed Flood MD    Enclosure  CC:  No Recipients    If you would like to receive this communication electronically, please contact [email or web address] or [phone] to request more information on [email or web address] Link access.    For providers and/or their staff who would like to refer a patient to [facility], please contact us through our one-stop-shop provider referral line, [facility], at [phone].    If you feel you have received this communication in error or would no longer like to receive these types of communications, please e-mail [email or web address]

## 2019-08-14 ENCOUNTER — TELEPHONE (OUTPATIENT)
Dept: RHEUMATOLOGY | Facility: CLINIC | Age: 69
End: 2019-08-14

## 2019-08-20 ENCOUNTER — TELEPHONE (OUTPATIENT)
Dept: SURGERY | Facility: CLINIC | Age: 69
End: 2019-08-20

## 2019-08-20 ENCOUNTER — OFFICE VISIT (OUTPATIENT)
Dept: UROLOGY | Facility: CLINIC | Age: 69
End: 2019-08-20
Payer: MEDICARE

## 2019-08-20 VITALS
HEART RATE: 79 BPM | WEIGHT: 198 LBS | DIASTOLIC BLOOD PRESSURE: 82 MMHG | BODY MASS INDEX: 25.41 KG/M2 | SYSTOLIC BLOOD PRESSURE: 120 MMHG | HEIGHT: 74 IN

## 2019-08-20 DIAGNOSIS — C61 PROSTATE CANCER: ICD-10-CM

## 2019-08-20 DIAGNOSIS — N39.3 SUI (STRESS URINARY INCONTINENCE), MALE: ICD-10-CM

## 2019-08-20 DIAGNOSIS — R97.20 ELEVATED PSA: ICD-10-CM

## 2019-08-20 DIAGNOSIS — Z90.79 S/P TURP: ICD-10-CM

## 2019-08-20 DIAGNOSIS — N40.0 BENIGN PROSTATIC HYPERPLASIA WITHOUT LOWER URINARY TRACT SYMPTOMS: Primary | ICD-10-CM

## 2019-08-20 DIAGNOSIS — D49.59 NEOPLASM OF PROSTATE: ICD-10-CM

## 2019-08-20 PROCEDURE — 3074F SYST BP LT 130 MM HG: CPT | Mod: CPTII,S$GLB,, | Performed by: UROLOGY

## 2019-08-20 PROCEDURE — 1101F PT FALLS ASSESS-DOCD LE1/YR: CPT | Mod: CPTII,S$GLB,, | Performed by: UROLOGY

## 2019-08-20 PROCEDURE — 99215 PR OFFICE/OUTPT VISIT, EST, LEVL V, 40-54 MIN: ICD-10-PCS | Mod: S$GLB,,, | Performed by: UROLOGY

## 2019-08-20 PROCEDURE — 99215 OFFICE O/P EST HI 40 MIN: CPT | Mod: S$GLB,,, | Performed by: UROLOGY

## 2019-08-20 PROCEDURE — 99999 PR PBB SHADOW E&M-EST. PATIENT-LVL IV: CPT | Mod: PBBFAC,,, | Performed by: UROLOGY

## 2019-08-20 PROCEDURE — 1101F PR PT FALLS ASSESS DOC 0-1 FALLS W/OUT INJ PAST YR: ICD-10-PCS | Mod: CPTII,S$GLB,, | Performed by: UROLOGY

## 2019-08-20 PROCEDURE — 3074F PR MOST RECENT SYSTOLIC BLOOD PRESSURE < 130 MM HG: ICD-10-PCS | Mod: CPTII,S$GLB,, | Performed by: UROLOGY

## 2019-08-20 PROCEDURE — 99999 PR PBB SHADOW E&M-EST. PATIENT-LVL IV: ICD-10-PCS | Mod: PBBFAC,,, | Performed by: UROLOGY

## 2019-08-20 PROCEDURE — 3079F PR MOST RECENT DIASTOLIC BLOOD PRESSURE 80-89 MM HG: ICD-10-PCS | Mod: CPTII,S$GLB,, | Performed by: UROLOGY

## 2019-08-20 PROCEDURE — 3079F DIAST BP 80-89 MM HG: CPT | Mod: CPTII,S$GLB,, | Performed by: UROLOGY

## 2019-08-20 RX ORDER — IMIPRAMINE HYDROCHLORIDE 25 MG/1
25 TABLET, FILM COATED ORAL 3 TIMES DAILY
Qty: 90 TABLET | Refills: 11 | Status: ON HOLD | OUTPATIENT
Start: 2019-08-20 | End: 2019-12-31 | Stop reason: HOSPADM

## 2019-08-20 NOTE — PROGRESS NOTES
CC:  Elevated PSA, ZEB    Jarrett Charlton Jr. is a 69 y.o. man who is here for the evaluation of elevated PSA.  Hx of no rectum as well as concerning PIRADS lesion on MRI.   Had TURP for biopsying suspicious area        Date: 01/23/2019  Procedure(s) Performed:   TURP  Findings:   Open bladder neck some regrowth of adenoma and suspicious area in right mid prostate   Right side of prostate resected and sent for pathology    Since TURP, he noted that he can void better with better urine flow.  Blood in urine resolved.  However, he reports Occasional ZEB but is getting better.    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  He was not interested in SUDS.  S/p colon surgery and his anus is closed.  Therefore dong TRUS bx of prostate is not feasible.  After discussion, we decided to try finasteride to see whether it will lower his PSA.   He has been on finasteride for the past couple of years. Following TURP in 1/2019, we decided to stop taking finasteride.    When he was considered to undergo TURP, we could not do his surgery because he was not able to stop ASA or Plavix due to fresh vascular stent.  He got a clearance that he can hold off plavix and ASA prior to TURP.    hx of straining to urinate but not bothered by it.  Even then his urine flow is not strong.  He tried flomax for the past 2 months without significant improvement.  Does feel that he is emptying his bladder completely.  Hx of ulcerative colitis, had colon surgery back in 1985 and his anus was closed.  Has a neuropathy on the right leg.  Hx of sciatic nerve on both sides and received injection on the back.  Hx of diabetes diagnosed 2 years ago.  No family hx of prostate cancer.  He is a .  Denies flank pain, dysuira, hematuria .      Past Medical History:   Diagnosis Date    Basal cell carcinoma     BPH (benign prostatic hyperplasia)     s/p TURP     Carotid stenosis     Chronic kidney disease     Claudication     DDD (degenerative disc disease) 10/21/2013    Diabetes mellitus with renal complications     Disc disease, degenerative, cervical     GERD (gastroesophageal reflux disease)     Gout, chronic     History of ulcerative colitis     s/p colectomy and ileostomy    HLD (hyperlipidemia)     HTN (hypertension)     Ileostomy in place 1982    RBBB     Squamous cell carcinoma 03/08/2018    Left superior helix near insertion    Squamous cell carcinoma 04/12/2018    Left forearm x 5    Ventricular tachycardia      Past Surgical History:   Procedure Laterality Date    Oatvksecq-Efmhtosifxu-Zt  11/10/2017    Performed by [facility]ado [facility] at [facility] CATH LAB    cardiac stents      CATARACT EXTRACTION Bilateral     CERVICAL FUSION      colectomy and ileostomy  1985    RAJENDRA-TRANSFORAMINAL N/A 6/4/2015    Performed by Dos Diagnostic Provider at [facility] CATH LAB    RAJENDRA-TRANSFORAMINAL N/A 12/4/2013    Performed by Dos Diagnostic Provider at [facility] CATH LAB    INJECTION, STEROID, SPINE, LUMBAR, EPIDURAL N/A 10/21/2013    Performed by Dosc Diagnostic Provider at [facility] CATH LAB    INJECTION-STEROID-EPIDURAL N/A 5/27/2014    Performed by [facility] Diagnostic Provider at [facility] CATH LAB    INJECTION-STEROID-EPIDURAL-LUMBAR N/A 11/30/2015    Performed by D[facility] Diagnostic Provider at [facility] CATH LAB    INJECTION-STEROID-EPIDURAL-LUMBAR N/A 9/17/2014    Performed by Dos Diagnostic Provider at [facility] CATH LAB    Left heart cath Left 11/10/2017    Performed by [facility]ado MD at [facility] CATH LAB    Percutaneous coronary intervention  11/10/2017    Performed by [facility] at [facility] CATH LAB    TURP, WITHOUT USING LASER BIPOLAR N/A 1/23/2019    Performed by [facility]ino [facility]ota [facility] at [facility] OR [address] [place] [address]     Social History     Tobacco Use    Smoking status: Never Smoker    Smokeless tobacco: Never Used   Substance Use Topics    Alcohol use: No    Drug use: No      Family History   Problem Relation Age of Onset    Melanoma Neg Hx     Hypertension Neg Hx     Diabetes Neg Hx     Arthritis Neg Hx      Allergy:  No Known Allergies  Outpatient Encounter Medications as of 8/20/2019   Medication Sig Dispense Refill    allopurinol (ZYLOPRIM) 300 MG tablet 1 1/2 - 2 a day      aspirin (ECOTRIN) 81 MG EC tablet Take 81 mg by mouth once daily.        BD ALCOHOL SWABS PadM       blood sugar diagnostic (ONE TOUCH ULTRA TEST) Strp USE ONE STRIP TO CHECK GLUCOSE EVERY  each 11    ergocalciferol, vitamin D2, (VITAMIN D ORAL) Take by mouth.      gabapentin (NEURONTIN) 600 MG tablet Take 1 tablet (600 mg total) by mouth 2 (two) times daily. 180 tablet 2    imipramine (TOFRANIL) 25 MG tablet Take 1 tablet (25 mg total) by mouth 3 (three) times daily. 90 tablet 11    metFORMIN (GLUCOPHAGE) 1000 MG tablet Take 1,000 mg by mouth 2 (two) times daily with meals.      metoprolol succinate (TOPROL XL) 25 MG 24 hr tablet Take 25 mg by mouth once daily.      pantoprazole (PROTONIX) 40 MG tablet Take 40 mg by mouth once daily.      pravastatin (PRAVACHOL) 40 MG tablet TAKE 1 TABLET EVERY DAY 90 tablet 3     Facility-Administered Encounter Medications as of 8/20/2019   Medication Dose Route Frequency Provider Last Rate Last Dose    leuprolide (6 month) injection 45 mg  45 mg Intramuscular Q6 Months Catarino Mota MD         Review of Systems   General ROS: GENERAL:  No weight gain or loss  SKIN:  No rashes or lacerations  HEAD:  No headaches  EYES:  No exophthalmos, jaundice or blindness  EARS:  No dizziness, tinnitus or hearing loss  NOSE:  No changes in smell  MOUTH & THROAT:  No dyskinetic movements or obvious goiter  CHEST:  No shortness of breath, hyperventilation or cough  CARDIOVASCULAR:  No tachycardia or chest pain  ABDOMEN:  No nausea, vomiting, pain, constipation or diarrhea  URINARY:  No frequency, dysuria or sexual dysfunction  ENDOCRINE:  No polydipsia,  polyuria  MUSCULOSKELETAL:  No pain or stiffness of the joints  NEUROLOGIC:  No weakness, sensory changes, seizures, confusion, memory loss, tremor or other abnormal movements  Physical Exam     Vitals:    08/20/19 0919   BP: 120/82   Pulse: 79     General Appearance:  Alert, cooperative, no distress, appears stated age   Head:  Normocephalic, without obvious abnormality, atraumatic   Eyes:  PERRL, conjunctiva/corneas clear, EOM's intact, fundi benign, both eyes   Ears:  Normal TM's and external ear canals, both ears   Nose: Nares normal, septum midline, mucosa normal, no drainage or sinus tenderness   Throat: Lips, mucosa, and tongue normal; teeth and gums normal   Neck: Supple, symmetrical, trachea midline, no adenopathy, thyroid: not enlarged, symmetric, no tenderness/mass/nodules, no carotid bruit or JVD   Back:   Symmetric, no curvature, ROM normal, no CVA tenderness   Lungs:   Clear to auscultation bilaterally, respirations unlabored   Chest Wall:  No tenderness or deformity   Heart:  Regular rate and rhythm, S1, S2 normal, no murmur, rub or gallop   Abdomen:   Soft, non-tender, bowel sounds active all four quadrants,  no masses, no organomegaly  Ileostomy in tact.   Genitalia:  Normal male, normal testicles, normal epididymis, normal vas palpated.       Extremities: Extremities normal, atraumatic, no cyanosis or edema   Pulses: 2+ and symmetric   Skin: Skin color, texture, turgor normal, no rashes or lesions   Lymph nodes: Cervical, supraclavicular, and axillary nodes normal   Neurologic: Normal     Prostate unable to check.     PVR 0 ml per bladder scan.    LABS:  Lab Results   Component Value Date    PSA 2.1 09/15/2014    PSA 2.16 05/13/2013    PSA 1.2 03/19/2012    PSADIAG 6.3 (H) 08/16/2019    PSADIAG 4.1 (H) 11/21/2018    PSADIAG 3.3 06/15/2018     Lab Results   Component Value Date    CREATININE 1.3 07/20/2019    CREATININE 1.4 07/19/2019    CREATININE 1.3 05/09/2019     MRI of prostate  12/9/18  Redemonstration of a focal lesion along the posterior margin of the TURP resection cavity concerning for prostatic cancer.  Lesion is grossly unchanged from examination dated 06/19/2018.    Overall Assessment:    PIRADS 4 (clinically significant cancer is likely to be present)    Number of targets created for potential MR/US fusion biopsy    Residual prostatic tissue: 1    TURP Pathology 1/23/19  SPECIMEN  1) Right prostate chips.  FINAL PATHOLOGIC DIAGNOSIS  Right prostate gland, transurethral resection:  - Benign prostatic tissue with nodular stromal hyperplasia  - Negative for malignancy    Assessment and Plan:  Jarrett was seen today for elevated psa.    Diagnoses and all orders for this visit:    Benign prostatic hyperplasia without lower urinary tract symptoms    Elevated PSA  -     leuprolide (6 month) injection 45 mg  -     Prior Authorization Order    S/P TURP    Neoplasm of prostate  -     MRI Prostate W W/O Contrast; Future    ZEB (stress urinary incontinence), male  -     imipramine (TOFRANIL) 25 MG tablet; Take 1 tablet (25 mg total) by mouth 3 (three) times daily.    Prostate cancer  -     leuprolide (6 month) injection 45 mg  -     Prior Authorization Order    he was on Finasteride prior to TURP.  His PSA 4.1 on finasteride could have been 8.2 without finasteride.    So his PSA 6.3 may indicate lower than his previous PSA.    However, his PSA 6.3 is still elevated to be abnormal.    So we decided to repeat MRI of prostate again and determine whether or not a lesion of the transitional area or prostate is still present.  TURP specimen did not reveal any malignancy.    We also discussed possibility of starting on ADT without a definite tissue diagnosis.    Kegel exercise for his mild ZEB.  Start imipramine TID.  I spent 40 minutes with the patient of which more than half was spent in direct consultation with the patient in regards to our treatment and plan.      Follow-up:  Follow up possible  Lupron injection, for MRI of prostate.

## 2019-08-20 NOTE — PATIENT INSTRUCTIONS
Lab Results   Component Value Date    PSA 2.1 09/15/2014    PSA 2.16 05/13/2013    PSA 1.2 03/19/2012    PSADIAG 6.3 (H) 08/16/2019    PSADIAG 4.1 (H) 11/21/2018    PSADIAG 3.3 06/15/2018

## 2019-08-20 NOTE — TELEPHONE ENCOUNTER
Called to check on patient.   He is doing well.   No issues with his stoma.   No recurrent bowel obstructions.   He is going to work with Dr. Mota regarding his increased PSA.   He will follow up with me if there are any issues with his stoma or if he has recurrent blockages and we will plan for open lysis of adhesions and mesh underlay.     CED Haley MD  Staff Surgeon  Colon & Rectal Surgery

## 2019-08-28 ENCOUNTER — LAB VISIT (OUTPATIENT)
Dept: LAB | Facility: HOSPITAL | Age: 69
End: 2019-08-28
Payer: MEDICARE

## 2019-08-28 ENCOUNTER — TELEPHONE (OUTPATIENT)
Dept: NEPHROLOGY | Facility: CLINIC | Age: 69
End: 2019-08-28

## 2019-08-28 ENCOUNTER — OFFICE VISIT (OUTPATIENT)
Dept: NEPHROLOGY | Facility: CLINIC | Age: 69
End: 2019-08-28
Payer: MEDICARE

## 2019-08-28 VITALS
OXYGEN SATURATION: 99 % | SYSTOLIC BLOOD PRESSURE: 120 MMHG | DIASTOLIC BLOOD PRESSURE: 80 MMHG | HEART RATE: 103 BPM | HEIGHT: 74 IN | BODY MASS INDEX: 25.47 KG/M2 | WEIGHT: 198.44 LBS

## 2019-08-28 DIAGNOSIS — N18.30 CKD (CHRONIC KIDNEY DISEASE) STAGE 3, GFR 30-59 ML/MIN: Primary | ICD-10-CM

## 2019-08-28 DIAGNOSIS — I10 ESSENTIAL HYPERTENSION: ICD-10-CM

## 2019-08-28 DIAGNOSIS — N18.30 CKD (CHRONIC KIDNEY DISEASE) STAGE 3, GFR 30-59 ML/MIN: ICD-10-CM

## 2019-08-28 LAB
ALBUMIN SERPL BCP-MCNC: 3.7 G/DL (ref 3.5–5.2)
ANION GAP SERPL CALC-SCNC: 12 MMOL/L (ref 8–16)
BUN SERPL-MCNC: 40 MG/DL (ref 8–23)
CALCIUM SERPL-MCNC: 9.6 MG/DL (ref 8.7–10.5)
CHLORIDE SERPL-SCNC: 105 MMOL/L (ref 95–110)
CO2 SERPL-SCNC: 20 MMOL/L (ref 23–29)
CREAT SERPL-MCNC: 2.2 MG/DL (ref 0.5–1.4)
EST. GFR  (AFRICAN AMERICAN): 33.8 ML/MIN/1.73 M^2
EST. GFR  (NON AFRICAN AMERICAN): 29.3 ML/MIN/1.73 M^2
GLUCOSE SERPL-MCNC: 133 MG/DL (ref 70–110)
PHOSPHATE SERPL-MCNC: 4.2 MG/DL (ref 2.7–4.5)
POTASSIUM SERPL-SCNC: 4.3 MMOL/L (ref 3.5–5.1)
PTH-INTACT SERPL-MCNC: 50 PG/ML (ref 9–77)
SODIUM SERPL-SCNC: 137 MMOL/L (ref 136–145)

## 2019-08-28 PROCEDURE — 83970 ASSAY OF PARATHORMONE: CPT

## 2019-08-28 PROCEDURE — 3074F PR MOST RECENT SYSTOLIC BLOOD PRESSURE < 130 MM HG: ICD-10-PCS | Mod: CPTII,S$GLB,, | Performed by: INTERNAL MEDICINE

## 2019-08-28 PROCEDURE — 1101F PT FALLS ASSESS-DOCD LE1/YR: CPT | Mod: CPTII,S$GLB,, | Performed by: INTERNAL MEDICINE

## 2019-08-28 PROCEDURE — 36415 COLL VENOUS BLD VENIPUNCTURE: CPT

## 2019-08-28 PROCEDURE — 1101F PR PT FALLS ASSESS DOC 0-1 FALLS W/OUT INJ PAST YR: ICD-10-PCS | Mod: CPTII,S$GLB,, | Performed by: INTERNAL MEDICINE

## 2019-08-28 PROCEDURE — 99215 PR OFFICE/OUTPT VISIT, EST, LEVL V, 40-54 MIN: ICD-10-PCS | Mod: S$GLB,,, | Performed by: INTERNAL MEDICINE

## 2019-08-28 PROCEDURE — 3079F DIAST BP 80-89 MM HG: CPT | Mod: CPTII,S$GLB,, | Performed by: INTERNAL MEDICINE

## 2019-08-28 PROCEDURE — 99999 PR PBB SHADOW E&M-EST. PATIENT-LVL III: CPT | Mod: PBBFAC,,, | Performed by: INTERNAL MEDICINE

## 2019-08-28 PROCEDURE — 99215 OFFICE O/P EST HI 40 MIN: CPT | Mod: S$GLB,,, | Performed by: INTERNAL MEDICINE

## 2019-08-28 PROCEDURE — 3074F SYST BP LT 130 MM HG: CPT | Mod: CPTII,S$GLB,, | Performed by: INTERNAL MEDICINE

## 2019-08-28 PROCEDURE — 3079F PR MOST RECENT DIASTOLIC BLOOD PRESSURE 80-89 MM HG: ICD-10-PCS | Mod: CPTII,S$GLB,, | Performed by: INTERNAL MEDICINE

## 2019-08-28 PROCEDURE — 99999 PR PBB SHADOW E&M-EST. PATIENT-LVL III: ICD-10-PCS | Mod: PBBFAC,,, | Performed by: INTERNAL MEDICINE

## 2019-08-28 PROCEDURE — 80069 RENAL FUNCTION PANEL: CPT

## 2019-08-28 NOTE — PROGRESS NOTES
"Subjective:       Patient ID: Jarrett Charlton Jr. is a 69 y.o. White male who presents for follow-up evaluation of No chief complaint on file.    HPI This is a 69-year-old white male with longstanding hypertension, anemia, and chronic kidney disease is coming in for followup of these. DM labile per pt with a1c of 6+. Bp's at home in the 120's/80's but for the last 3 days it's been lower in the 90's.  Used to be in the 80's systolic but improved after lisinopril 2.5 mg has been discontinued. Denies syncopal episodes or dizziness but and had several "syncope" in the past.  Hydrating well.    No proteinuria and anemia stable. Denies NSAID's. Recently had prostate surgery and is being evaluated for elevated prostrate.    PAST MEDICAL HISTORY: Hypertension for 5 years,recently diag diabetes, gout, ulcerative colitis with ostomy and hyperlipidemia.     Review of Systems   Constitutional: Negative for appetite change and fatigue.   Eyes: Negative for discharge.   Respiratory: Negative for cough, shortness of breath and wheezing.    Cardiovascular: Negative for chest pain and palpitations.   Gastrointestinal: Negative for abdominal pain, diarrhea, nausea and vomiting.   Genitourinary: Negative for dysuria, frequency, hematuria and urgency.   Skin: Negative for color change and rash.   Psychiatric/Behavioral: Negative for confusion.   All other systems reviewed and are negative.      Objective:      Physical Exam   Constitutional: He is oriented to person, place, and time. He appears well-developed and well-nourished.   HENT:   Right Ear: External ear normal.   Left Ear: External ear normal.   Nose: Nose normal.   Mouth/Throat: Normal dentition.   Eyes: Pupils are equal, round, and reactive to light. Conjunctivae, EOM and lids are normal.   Neck: Trachea normal. No thyroid mass present.   Cardiovascular: Normal rate and regular rhythm. Exam reveals no friction rub.   No murmur heard.  No lower extremity edema "   Pulmonary/Chest: Effort normal and breath sounds normal. No respiratory distress. He has no decreased breath sounds. He has no rales.   Abdominal: Soft. Bowel sounds are normal. He exhibits no mass. There is no tenderness. There is no rebound. No hernia.   Ostomy bag inplace.   Musculoskeletal: He exhibits no edema.   Neurological: He is alert and oriented to person, place, and time.   Skin: Skin is warm and dry. No rash noted. No erythema.   Psychiatric: He has a normal mood and affect. Judgment normal. His mood appears not anxious. He does not exhibit a depressed mood.   Oriented to time, place and person.   Vitals reviewed.      Assessment:       No diagnosis found.    Plan:           This is a 69 year-old white male with hypertension,CKD,DM,   ulcerative colitis who is coming in for followup.     PROBLEMS:   1. 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 His baseline   creatinines use to be anywhere from 1.5 to 1.7 with average in the 1.3  range with the most recent creatinine being 1.3 with MDRD GFR of 55 ml/min . His CKD is secondary to age-related nephron loss, HTN along with NSAID use in the past for his gout and dehydration with high ostomy output. Off NSAID's. Hydrate well especially when he has more ostomy output and is on lomotil which is helping.  2. Hypertension. Bp's stable now off lisnopril.   Monitor bp's closely. Asked pt to hold lisinopril to help with bp's. No sig proteinuria.   3. Anemia. H&H is stable.   4. Renal osteodystrophy. His intact PTH along with ca/phos is stable.   On vit d OTC.  5. Proteinuria. He has no sig proteinuria.  Off  Lisinopril with low bp's. We will see the patient back again in 4 Months with rfp, i pth, urine creatinine and urine protein.   Discussed protonix side effects.

## 2019-08-28 NOTE — TELEPHONE ENCOUNTER
Please let him know that his creatinine is much higher at 2.2.  I would like him to hydrate well today and repeat rfp tomorrow. If it's not better, he will need IV hydration.

## 2019-08-28 NOTE — TELEPHONE ENCOUNTER
Called pt to inform him his results and nolvia a repeat RFP tomorrow. Pt had questions and concern . I forward message to dr kitchen waiting on response.    Please let him know that his creatinine is much higher at 2.2.  I would like him to hydrate well today and repeat rfp tomorrow. If it's not better, he will need IV hydration.     25y M w/ a PMH of asthma presenting wi one week of worsening asthma symptoms currently stable. plan duonebs, steroid, monitor and dispo pending SaO2 sats after ambulation.

## 2019-08-29 ENCOUNTER — TELEPHONE (OUTPATIENT)
Dept: NEPHROLOGY | Facility: CLINIC | Age: 69
End: 2019-08-29

## 2019-08-29 DIAGNOSIS — N18.30 CKD (CHRONIC KIDNEY DISEASE) STAGE 3, GFR 30-59 ML/MIN: Primary | ICD-10-CM

## 2019-08-29 NOTE — TELEPHONE ENCOUNTER
Please let him know that his creatinine is slightly better at 1.8 but not close to his baseline.  Please have him stop the imipramine as he may be retaining urine causing his numbers to go up.  I will notify Dr. Mota as well to make sure he is OK with it but he should hold it for now.  He needs rfp on Tuesday. Thanks.    Breath sounds clear and equal bilaterally.

## 2019-08-29 NOTE — TELEPHONE ENCOUNTER
I called pt per dr kitchen on his results and to hold the medication dr perez prescribed him that's causing his numbers to go up per dr kitchen . Pt is repeating the rfp on Tuesday. Pt had no question or concern.    Please let him know that his creatinine is slightly better at 1.8 but not close to his baseline.  Please have him stop the imipramine as he may be retaining urine causing his numbers to go up.  I will notify Dr. Perez as well to make sure he is OK with it but he should hold it for now.  He needs rfp on Tuesday. Thanks.

## 2019-08-30 ENCOUNTER — TELEPHONE (OUTPATIENT)
Dept: OPTOMETRY | Facility: CLINIC | Age: 69
End: 2019-08-30

## 2019-08-30 NOTE — TELEPHONE ENCOUNTER
He had a GRADY but slightly better now at at 1.8 but not close to his baseline.  I ahd him  stop the imipramine as he may be retaining urine causing his numbers to go up. Please let me know if this is a problem.  Thanks.

## 2019-09-03 ENCOUNTER — TELEPHONE (OUTPATIENT)
Dept: NEPHROLOGY | Facility: CLINIC | Age: 69
End: 2019-09-03

## 2019-09-03 ENCOUNTER — TELEPHONE (OUTPATIENT)
Dept: NEPHROLOGY | Facility: HOSPITAL | Age: 69
End: 2019-09-03

## 2019-09-03 DIAGNOSIS — N18.30 CKD (CHRONIC KIDNEY DISEASE) STAGE 3, GFR 30-59 ML/MIN: Primary | ICD-10-CM

## 2019-09-03 NOTE — TELEPHONE ENCOUNTER
I called to inform him of his results , I also schedule him his US per dr kitchen and repeat of rfp    Creatinine  still at 1.7.  Please have him get a renal US today if possible.  I also contacted urology and they will get him in to see if he is retainining    urine. He also needs rfp later in the week on Friday or so.  Hydrate well.

## 2019-09-03 NOTE — TELEPHONE ENCOUNTER
Creatinine  still at 1.7.  Please have him get a renal US today if possible.  I also contacted urology and they will get him in to see if he is retainining    urine. He also needs rfp later in the week on Friday or so.  Hydrate well.

## 2019-09-03 NOTE — TELEPHONE ENCOUNTER
His creatinine is still on the higher side today-PVR sounds good.  Please set it up.  Thanks.  I am going to order US also.

## 2019-09-06 ENCOUNTER — TELEPHONE (OUTPATIENT)
Dept: NEPHROLOGY | Facility: CLINIC | Age: 69
End: 2019-09-06

## 2019-09-06 ENCOUNTER — TELEPHONE (OUTPATIENT)
Dept: NEPHROLOGY | Facility: HOSPITAL | Age: 69
End: 2019-09-06

## 2019-09-06 DIAGNOSIS — N18.30 CKD (CHRONIC KIDNEY DISEASE) STAGE 3, GFR 30-59 ML/MIN: Primary | ICD-10-CM

## 2019-09-06 NOTE — TELEPHONE ENCOUNTER
I called pt per dr kitchen of his lab results and schedule his lab 10 days later. Also moved his US to Monday    His creatinine still at 1.7 unchanged from last time.  Please see if we can move up the US to monday to make sure he does not have obstruction. He should con't to hydrate well. Labs in 10 days.

## 2019-09-06 NOTE — TELEPHONE ENCOUNTER
His creatinine still at 1.7 unchanged from last time.  Please see if we can move up the US to monday to make sure he does not have obstruction. He should con't to hydrate well. Labs in 10 days.

## 2019-09-09 ENCOUNTER — TELEPHONE (OUTPATIENT)
Dept: NEPHROLOGY | Facility: CLINIC | Age: 69
End: 2019-09-09

## 2019-09-09 ENCOUNTER — HOSPITAL ENCOUNTER (OUTPATIENT)
Dept: RADIOLOGY | Facility: HOSPITAL | Age: 69
Discharge: HOME | End: 2019-09-09
Attending: INTERNAL MEDICINE
Payer: MEDICARE

## 2019-09-09 ENCOUNTER — TELEPHONE (OUTPATIENT)
Dept: NEPHROLOGY | Facility: HOSPITAL | Age: 69
End: 2019-09-09

## 2019-09-09 DIAGNOSIS — N18.30 CKD (CHRONIC KIDNEY DISEASE) STAGE 3, GFR 30-59 ML/MIN: ICD-10-CM

## 2019-09-09 PROCEDURE — 76770 US KIDNEY: ICD-10-PCS | Mod: 26,,, | Performed by: RADIOLOGY

## 2019-09-09 PROCEDURE — 76770 US EXAM ABDO BACK WALL COMP: CPT | Mod: 26,,, | Performed by: RADIOLOGY

## 2019-09-09 PROCEDURE — 76770 US EXAM ABDO BACK WALL COMP: CPT | Mod: TC

## 2019-09-11 ENCOUNTER — OFFICE VISIT (OUTPATIENT)
Dept: UROLOGY | Facility: CLINIC | Age: 69
End: 2019-09-11
Payer: MEDICARE

## 2019-09-11 ENCOUNTER — HOSPITAL ENCOUNTER (OUTPATIENT)
Dept: RADIOLOGY | Facility: HOSPITAL | Age: 69
Discharge: HOME | End: 2019-09-11
Attending: UROLOGY
Payer: MEDICARE

## 2019-09-11 VITALS
HEART RATE: 83 BPM | DIASTOLIC BLOOD PRESSURE: 90 MMHG | WEIGHT: 198 LBS | BODY MASS INDEX: 25.41 KG/M2 | SYSTOLIC BLOOD PRESSURE: 131 MMHG | HEIGHT: 74 IN

## 2019-09-11 DIAGNOSIS — D49.59 NEOPLASM OF PROSTATE: ICD-10-CM

## 2019-09-11 DIAGNOSIS — N40.1 BPH WITH OBSTRUCTION/LOWER URINARY TRACT SYMPTOMS: Primary | ICD-10-CM

## 2019-09-11 DIAGNOSIS — N13.8 BPH WITH OBSTRUCTION/LOWER URINARY TRACT SYMPTOMS: Primary | ICD-10-CM

## 2019-09-11 DIAGNOSIS — N28.9 FUNCTION KIDNEY DECREASED: ICD-10-CM

## 2019-09-11 PROCEDURE — 99999 PR PBB SHADOW E&M-EST. PATIENT-LVL III: CPT | Mod: PBBFAC,,, | Performed by: NURSE PRACTITIONER

## 2019-09-11 PROCEDURE — A9585 GADOBUTROL INJECTION: HCPCS | Performed by: UROLOGY

## 2019-09-11 PROCEDURE — 3075F PR MOST RECENT SYSTOLIC BLOOD PRESS GE 130-139MM HG: ICD-10-PCS | Mod: CPTII,S$GLB,, | Performed by: NURSE PRACTITIONER

## 2019-09-11 PROCEDURE — 99214 OFFICE O/P EST MOD 30 MIN: CPT | Mod: S$GLB,,, | Performed by: NURSE PRACTITIONER

## 2019-09-11 PROCEDURE — 3080F DIAST BP >= 90 MM HG: CPT | Mod: CPTII,S$GLB,, | Performed by: NURSE PRACTITIONER

## 2019-09-11 PROCEDURE — 72197 MRI PELVIS W/O & W/DYE: CPT | Mod: TC

## 2019-09-11 PROCEDURE — 1101F PT FALLS ASSESS-DOCD LE1/YR: CPT | Mod: CPTII,S$GLB,, | Performed by: NURSE PRACTITIONER

## 2019-09-11 PROCEDURE — 99999 PR PBB SHADOW E&M-EST. PATIENT-LVL III: ICD-10-PCS | Mod: PBBFAC,,, | Performed by: NURSE PRACTITIONER

## 2019-09-11 PROCEDURE — 25500020 PHARM REV CODE 255: Performed by: UROLOGY

## 2019-09-11 PROCEDURE — 1101F PR PT FALLS ASSESS DOC 0-1 FALLS W/OUT INJ PAST YR: ICD-10-PCS | Mod: CPTII,S$GLB,, | Performed by: NURSE PRACTITIONER

## 2019-09-11 PROCEDURE — 3080F PR MOST RECENT DIASTOLIC BLOOD PRESSURE >= 90 MM HG: ICD-10-PCS | Mod: CPTII,S$GLB,, | Performed by: NURSE PRACTITIONER

## 2019-09-11 PROCEDURE — 72197 MRI PELVIS W/O & W/DYE: CPT | Mod: 26,,, | Performed by: RADIOLOGY

## 2019-09-11 PROCEDURE — 99214 PR OFFICE/OUTPT VISIT, EST, LEVL IV, 30-39 MIN: ICD-10-PCS | Mod: S$GLB,,, | Performed by: NURSE PRACTITIONER

## 2019-09-11 PROCEDURE — 3075F SYST BP GE 130 - 139MM HG: CPT | Mod: CPTII,S$GLB,, | Performed by: NURSE PRACTITIONER

## 2019-09-11 PROCEDURE — 72197 MRI PROSTATE W W/O CONTRAST: ICD-10-PCS | Mod: 26,,, | Performed by: RADIOLOGY

## 2019-09-11 RX ORDER — GADOBUTROL 604.72 MG/ML
10 INJECTION INTRAVENOUS
Status: COMPLETED | OUTPATIENT
Start: 2019-09-11 | End: 2019-09-11

## 2019-09-11 RX ORDER — CLOPIDOGREL BISULFATE 75 MG/1
75 TABLET ORAL DAILY
COMMUNITY
Start: 2019-09-06 | End: 2023-02-01

## 2019-09-11 RX ADMIN — GADOBUTROL 10 ML: 604.72 INJECTION INTRAVENOUS at 01:09

## 2019-09-11 NOTE — PROGRESS NOTES
CHIEF COMPLAINT:    Mr. Charlton is a 69 y.o. male presenting for PVR check.  PRESENTING ILLNESS:    Jarrett Charlton Jr. is a 69 y.o. male who presents for PVR check. His last clinic visit was 8/20/19 with Dr. Mota.    S/p colon surgery and his anus is closed. Concerning PIRADS lesion on MRI. Had TURP for biopsying suspicious area.    Date: 01/23/2019  Procedure(s) Performed:   TURP  Findings:   Open bladder neck some regrowth of adenoma and suspicious area in right mid prostate   Right side of prostate resected and sent for pathology     Following TURP, he was voiding better. He was having ZEB and Dr. Mota started him on imipramine, which has resolved ZEB.     Today patient presents to clinic for PVR check. Patient states he has no idea why he is having this done or why he was scheduled today in clinic. After reviewing provider notes, patient's kidney function has decreased and his nephrologist wanted to make sure he was not retaining urine. Patient denies straining, hesitancy, difficulty voiding, frequency, urgency or incontinence. Denies dysuria, hematuria or flank pain. Denies fever or chills. He feels he is emptying his bladder.     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  He was not interested in SUDS.  S/p colon surgery and his anus is closed.  Therefore dong TRUS bx of prostate is not feasible.  After discussion, we decided to try finasteride to see whether it will lower his PSA.   He has been on finasteride for the past couple of years. Following TURP in 1/2019, we decided to stop taking finasteride.    REVIEW OF SYSTEMS:    Review of Systems    Constitutional: Negative for fever and chills.   HENT: Negative for hearing loss.   Eyes: Wears glasses. Negative for eye discharge   Respiratory: Negative for shortness of breath.   Cardiovascular: Negative for chest pain.   Gastrointestinal: Negative for nausea, vomiting.    Genitourinary:  See HPI  Neurological: Negative for dizziness.   Hematological: Does bruise/bleed easily. (takes plavix, ASA)  Psychiatric/Behavioral: Negative for confusion.       PATIENT HISTORY:    Past Medical History:   Diagnosis Date    Basal cell carcinoma     BPH (benign prostatic hyperplasia)     s/p TURP    Carotid stenosis     Chronic kidney disease     Claudication     DDD (degenerative disc disease) 10/21/2013    Diabetes mellitus with renal complications     Disc disease, degenerative, cervical     GERD (gastroesophageal reflux disease)     Gout, chronic     History of ulcerative colitis     s/p colectomy and ileostomy    HLD (hyperlipidemia)     HTN (hypertension)     Ileostomy in place 1982    RBBB     Squamous cell carcinoma 03/08/2018    Left superior helix near insertion    Squamous cell carcinoma 04/12/2018    Left forearm x 5    Ventricular tachycardia        Past Surgical History:   Procedure Laterality Date    Zijvgmskr-Vxnzyucantu-Fv  11/10/2017    Performed by [facility] at [facility] CATH LAB    cardiac stents      CATARACT EXTRACTION Bilateral     CERVICAL FUSION      colectomy and ileostomy  1985    RAJENDRA-TRANSFORAMINAL N/A 6/4/2015    Performed by [facility] CATH LAB    RAJENDRA-TRANSFORAMINAL N/A 12/4/2013    Performed by [facility] CATH LAB    INJECTION, STEROID, SPINE, LUMBAR, EPIDURAL N/A 10/21/2013    Performed by [facility] CATH LAB    INJECTION-STEROID-EPIDURAL N/A 5/27/2014    Performed by [facility] CATH LAB    INJECTION-STEROID-EPIDURAL-LUMBAR N/A 11/30/2015    Performed by [facility] CATH LAB    INJECTION-STEROID-EPIDURAL-LUMBAR N/A 9/17/2014    Performed by [facility] CATH LAB    Left heart cath Left 11/10/2017    Performed by [facility] at [facility] CATH LAB    Percutaneous coronary intervention  11/10/2017     [facility] CATH LAB    TURP, WITHOUT USING LASER BIPOLAR N/A 1/23/2019    Performed by Catarino Mota MD at [facility] OR [address] [place] [address]       Family History   Problem Relation Age of Onset    Melanoma Neg Hx     Hypertension Neg Hx     Diabetes Neg Hx     Arthritis Neg Hx        Social History     Socioeconomic History    Marital status:      Spouse name: Not on file    Number of children: 1    Years of education: Not on file    Highest education level: Not on file   Occupational History    Occupation:  x 44 years     Comment: Retired    Occupation: [place]    Social Needs    Financial resource strain: Not on file    Food insecurity:     Worry: Not on file     Inability: Not on file    Transportation needs:     Medical: Not on file     Non-medical: Not on file   Tobacco Use    Smoking status: Never Smoker    Smokeless tobacco: Never Used   Substance and Sexual Activity    Alcohol use: No    Drug use: No    Sexual activity: Not on file   Lifestyle    Physical activity:     Days per week: Not on file     Minutes per session: Not on file    Stress: Not on file   Relationships    Social connections:     Talks on phone: Not on file     Gets together: Not on file     Attends Evangelical service: Not on file     Active member of club or organization: Not on file     Attends meetings of clubs or organizations: Not on file     Relationship status: Not on file   Other Topics Concern    Not on file   Social History Narrative    Not on file       Allergies:  Patient has no known allergies.    Medications:    Current Outpatient Medications:     allopurinol (ZYLOPRIM) 300 MG tablet, 1 1/2 - 2 a day, Disp: , Rfl:     aspirin (ECOTRIN) 81 MG EC tablet, Take 81 mg by mouth once daily.  , Disp: , Rfl:     BD ALCOHOL SWABS PadM, , Disp: , Rfl:     blood sugar diagnostic (ONE TOUCH ULTRA TEST) Str, USE ONE STRIP TO CHECK GLUCOSE EVERY DAY, Disp: 100 each,  Rfl: 11    clopidogrel (PLAVIX) 75 mg tablet, , Disp: , Rfl:     ergocalciferol, vitamin D2, (VITAMIN D ORAL), Take by mouth., Disp: , Rfl:     FLUZONE HIGH-DOSE 2019-20, PF, 180 mcg/0.5 mL Syrg, PHARMACIST ADMINISTERED IMMUNIZATION ADMINISTERED AT TIME OF DISPENSING, Disp: , Rfl: 0    gabapentin (NEURONTIN) 600 MG tablet, Take 1 tablet (600 mg total) by mouth 2 (two) times daily., Disp: 180 tablet, Rfl: 2    imipramine (TOFRANIL) 25 MG tablet, Take 1 tablet (25 mg total) by mouth 3 (three) times daily., Disp: 90 tablet, Rfl: 11    metFORMIN (GLUCOPHAGE) 1000 MG tablet, Take 1,000 mg by mouth 2 (two) times daily with meals., Disp: , Rfl:     metoprolol succinate (TOPROL XL) 25 MG 24 hr tablet, Take 25 mg by mouth once daily., Disp: , Rfl:     pantoprazole (PROTONIX) 40 MG tablet, Take 40 mg by mouth once daily., Disp: , Rfl:     pravastatin (PRAVACHOL) 40 MG tablet, TAKE 1 TABLET EVERY DAY, Disp: 90 tablet, Rfl: 3    Current Facility-Administered Medications:     leuprolide (6 month) injection 45 mg, 45 mg, Intramuscular, Q6 Months, Catarino Mota MD    PHYSICAL EXAMINATION:    Constitutional: He is oriented to person, place, and time. He appears well-developed and well-nourished.  He is in no apparent distress.    Neck: Normal ROM.     Cardiovascular: Normal rate.      Pulmonary/Chest: Effort normal. No respiratory distress.     Abdominal: Ileostomy present. There is no CVA tenderness.     Lymphadenopathy:        Right: No supraclavicular adenopathy present.        Left: No supraclavicular adenopathy present.     Neurological: He is alert and oriented to person, place, and time.     Skin: Skin is warm and dry.     Extremities: Normal ROM    Psych: Cooperative with normal affect.      Physical Exam      LABS:    U/a: sp grav 1.015, pH 5, + leukocytes, trace protein, otherwise negative    PVR: done in clinic with bladder scanner by Lunan DILL was 0 ml.    Lab Results   Component Value Date    PSA 2.1  09/15/2014    PSA 2.16 05/13/2013    PSA 1.2 03/19/2012    PSADIAG 6.3 (H) 08/16/2019    PSADIAG 4.1 (H) 11/21/2018    PSADIAG 3.3 06/15/2018       IMPRESSION:    Encounter Diagnoses   Name Primary?    BPH with obstruction/lower urinary tract symptoms Yes    Function kidney decreased          PLAN:  -PVR resulted 0 ml. Reassured patient he is emptying his bladder.  Continue to follow nephrology for kidney function.  -Pt scheduled for MRI prostate today.   -Has f/u with Dr. Mota 9/20/19 to discuss MRI    I spent 25 minutes with the patient of which more than half was spent in coordinating the patient's care as well as in direct consultation with the patient in regards to our treatment and plan.

## 2019-09-17 DIAGNOSIS — N18.30 STAGE 3 CHRONIC KIDNEY DISEASE: Primary | ICD-10-CM

## 2019-09-17 NOTE — TELEPHONE ENCOUNTER
----- Message from Racquel Shea sent at 9/17/2019  8:33 AM CDT -----  Contact: Self [phone]   PT called to confirmed labs. He states he does not know why he is having labs done and is requesting a call at [phone].

## 2019-09-18 ENCOUNTER — TELEPHONE (OUTPATIENT)
Dept: NEPHROLOGY | Facility: CLINIC | Age: 69
End: 2019-09-18

## 2019-09-18 DIAGNOSIS — N18.30 CKD (CHRONIC KIDNEY DISEASE) STAGE 3, GFR 30-59 ML/MIN: Primary | ICD-10-CM

## 2019-09-18 NOTE — TELEPHONE ENCOUNTER
Patient Calls     Bertin Lyon DO routed conversation to Sonali Denton Staff 36 minutes ago (12:51 PM)      Bertin Lyon DO 37 minutes ago (12:50 PM)         Kidney function looks much better at 1.3 but potassium is hgiher at 5.5.  He should follow a low potassium diet-please discuss with him and mail him a list. Repeat potassium on Friday.  I ordered it. Angeline.          Documentation

## 2019-09-18 NOTE — TELEPHONE ENCOUNTER
Kidney function looks much better at 1.3 but potassium is hgiher at 5.5.  He should follow a low potassium diet-please discuss with him and mail him a list. Repeat potassium on Friday.  I ordered it. Angeline.

## 2019-09-20 ENCOUNTER — LAB VISIT (OUTPATIENT)
Dept: LAB | Facility: HOSPITAL | Age: 69
End: 2019-09-20
Payer: MEDICARE

## 2019-09-20 ENCOUNTER — OFFICE VISIT (OUTPATIENT)
Dept: UROLOGY | Facility: CLINIC | Age: 69
End: 2019-09-20
Payer: MEDICARE

## 2019-09-20 VITALS
WEIGHT: 198 LBS | HEART RATE: 77 BPM | HEIGHT: 74 IN | DIASTOLIC BLOOD PRESSURE: 86 MMHG | BODY MASS INDEX: 25.41 KG/M2 | SYSTOLIC BLOOD PRESSURE: 124 MMHG

## 2019-09-20 DIAGNOSIS — R97.20 ELEVATED PSA: Primary | ICD-10-CM

## 2019-09-20 DIAGNOSIS — N18.30 CKD (CHRONIC KIDNEY DISEASE) STAGE 3, GFR 30-59 ML/MIN: ICD-10-CM

## 2019-09-20 DIAGNOSIS — Z90.79 S/P TURP: ICD-10-CM

## 2019-09-20 PROBLEM — R32 URINARY INCONTINENCE: Status: RESOLVED | Noted: 2019-06-10 | Resolved: 2019-09-20

## 2019-09-20 LAB — POTASSIUM SERPL-SCNC: 4.5 MMOL/L (ref 3.5–5.1)

## 2019-09-20 PROCEDURE — 36415 COLL VENOUS BLD VENIPUNCTURE: CPT

## 2019-09-20 PROCEDURE — 3079F PR MOST RECENT DIASTOLIC BLOOD PRESSURE 80-89 MM HG: ICD-10-PCS | Mod: CPTII,S$GLB,, | Performed by: UROLOGY

## 2019-09-20 PROCEDURE — 3079F DIAST BP 80-89 MM HG: CPT | Mod: CPTII,S$GLB,, | Performed by: UROLOGY

## 2019-09-20 PROCEDURE — 99215 OFFICE O/P EST HI 40 MIN: CPT | Mod: S$GLB,,, | Performed by: UROLOGY

## 2019-09-20 PROCEDURE — 3074F PR MOST RECENT SYSTOLIC BLOOD PRESSURE < 130 MM HG: ICD-10-PCS | Mod: CPTII,S$GLB,, | Performed by: UROLOGY

## 2019-09-20 PROCEDURE — 1101F PR PT FALLS ASSESS DOC 0-1 FALLS W/OUT INJ PAST YR: ICD-10-PCS | Mod: CPTII,S$GLB,, | Performed by: UROLOGY

## 2019-09-20 PROCEDURE — 99999 PR PBB SHADOW E&M-EST. PATIENT-LVL III: ICD-10-PCS | Mod: PBBFAC,,, | Performed by: UROLOGY

## 2019-09-20 PROCEDURE — 1101F PT FALLS ASSESS-DOCD LE1/YR: CPT | Mod: CPTII,S$GLB,, | Performed by: UROLOGY

## 2019-09-20 PROCEDURE — 84132 ASSAY OF SERUM POTASSIUM: CPT

## 2019-09-20 PROCEDURE — 99215 PR OFFICE/OUTPT VISIT, EST, LEVL V, 40-54 MIN: ICD-10-PCS | Mod: S$GLB,,, | Performed by: UROLOGY

## 2019-09-20 PROCEDURE — 3074F SYST BP LT 130 MM HG: CPT | Mod: CPTII,S$GLB,, | Performed by: UROLOGY

## 2019-09-20 PROCEDURE — 99999 PR PBB SHADOW E&M-EST. PATIENT-LVL III: CPT | Mod: PBBFAC,,, | Performed by: UROLOGY

## 2019-09-20 RX ORDER — BRIMONIDINE TARTRATE 2 MG/ML
SOLUTION/ DROPS OPHTHALMIC
Refills: 4 | COMMUNITY
Start: 2019-09-09 | End: 2021-07-08 | Stop reason: SDUPTHER

## 2019-09-20 NOTE — PROGRESS NOTES
CC:  Elevated PSA, ZEB    Jarrett Charlton Jr. is a 69 y.o. man who is here for the evaluation of elevated PSA.  Hx of no rectum as well as concerning PIRADS lesion on MRI.   Had TURP for biopsying suspicious area      Date: 01/23/2019  Procedure(s) Performed:   TURP  Findings:   Open bladder neck some regrowth of adenoma and suspicious area in right mid prostate   Right side of prostate resected and sent for pathology  SPECIMEN  1) Right prostate chips.  FINAL PATHOLOGIC DIAGNOSIS  Right prostate gland, transurethral resection:  - Benign prostatic tissue with nodular stromal hyperplasia  - Negative for malignancy    Since TURP, he noted that he can void better with better urine flow.  Blood in urine resolved.  However, he reports Occasional ZEB but it got all better.  No more ZEB reported.  He is here with another MRI of prostate following TURP because of still rising PSA up to 6.3 from 4.1.    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  He was not interested in SUDS.  S/p colon surgery and his anus is closed.  Therefore dong TRUS bx of prostate is not feasible.  After discussion, we decided to try finasteride to see whether it will lower his PSA.   He has been on finasteride for the past couple of years. Following TURP in 1/2019, we decided to stop taking finasteride.    When he was considered to undergo TURP, we could not do his surgery because he was not able to stop ASA or Plavix due to fresh vascular stent.  He got a clearance that he can hold off plavix and ASA prior to TURP.  Hx of ulcerative colitis, had colon surgery back in 1985 and his anus was closed.  Has a neuropathy on the right leg.  Hx of sciatic nerve on both sides and received injection on the back.  Hx of diabetes diagnosed 2 years ago.  No family hx of prostate cancer.  He is a .  Denies flank pain, dysuira, hematuria .      MRI of prostate  8/20/19  Previous biopsy: Negative on 01/23/2019  PSA: 6.3 ng/mL (08/16/2019)  Prior therapy: TURP    Prostate: 3.6 x 3.5 x 3.0 cm corresponding to a computed volume of 15.2 cc.    Peripheral zone: Focal abnormalities with imaging features concerning for prostate cancer.  Lesion (MARYCARMEN) #1  Location: Side: right; Region: base; Zone: posterior peripheral zone laterally  Greatest dimension: 1.0 cm  T2-WI: T2 SI: circumscribed, homogeneous moderate hypointensity--score 4 or 5  DWI/ADC: DWI: Focal markedly hypointense on ADC and markedly hyperintense on high b-value DWI--score 4 or 5  DCE: Positive  Extraprostatic extension: No gross extraprostatic extension noting postsurgical changes limits evaluation.    PI-RADS assessment category: 4    Transitional zone: Mostly surgically resected.  Neurovascular bundle: Normal.  Seminal vesicles:  SV invasion: None  Adjacent Organ Involvement: No focal bladder wall thickening.  No rectal involvement.  Lymphadenopathy: None    Past Medical History:   Diagnosis Date    Basal cell carcinoma     BPH (benign prostatic hyperplasia)     s/p TURP    Carotid stenosis     Chronic kidney disease     Claudication     DDD (degenerative disc disease) 10/21/2013    Diabetes mellitus with renal complications     Disc disease, degenerative, cervical     GERD (gastroesophageal reflux disease)     Gout, chronic     History of ulcerative colitis     s/p colectomy and ileostomy    HLD (hyperlipidemia)     HTN (hypertension)     Ileostomy in place 1982    RBBB     Squamous cell carcinoma 03/08/2018    Left superior helix near insertion    Squamous cell carcinoma 04/12/2018    Left forearm x 5    Ventricular tachycardia      Past Surgical History:   Procedure Laterality Date    Unzvopeds-Agruzginpzt-Zj  11/10/2017    Performed by [facility]ado [facility] at [facility] CATH LAB    cardiac stents      CATARACT EXTRACTION Bilateral     CERVICAL FUSION      colectomy and ileostomy  1985     RAJENDRA-TRANSFORAMINAL N/A 6/4/2015    Performed by [facility] CATH LAB    RAJENDRA-TRANSFORAMINAL N/A 12/4/2013    Performed by [facility] CATH LAB    INJECTION, STEROID, SPINE, LUMBAR, EPIDURAL N/A 10/21/2013    Performed by [facility] CATH LAB    INJECTION-STEROID-EPIDURAL N/A 5/27/2014    Performed by [facility] CATH LAB    INJECTION-STEROID-EPIDURAL-LUMBAR N/A 11/30/2015    Performed by [facility] CATH LAB    INJECTION-STEROID-EPIDURAL-LUMBAR N/A 9/17/2014    Performed by [facility] Provider [facility] CATH LAB    Left heart cath Left 11/10/2017    [facility] at [facility] CATH LAB    Percutaneous coronary intervention  11/10/2017    Performed by [facility]ado MD at [facility] CATH LAB    TURP, WITHOUT USING LASER BIPOLAR N/A 1/23/2019    Performed by Cat[facility]ino Mota MD at [facility] OR [address] [place] [address]     Social History     Tobacco Use    Smoking status: Never Smoker    Smokeless tobacco: Never Used   Substance Use Topics    Alcohol use: No    Drug use: No     Family History   Problem Relation Age of Onset    Melanoma Neg Hx     Hypertension Neg Hx     Diabetes Neg Hx     Arthritis Neg Hx      Allergy:  No Known Allergies  Outpatient Encounter Medications as of 9/20/2019   Medication Sig Dispense Refill    allopurinol (ZYLOPRIM) 300 MG tablet 1 1/2 - 2 a day      aspirin (ECOTRIN) 81 MG EC tablet Take 81 mg by mouth once daily.        BD ALCOHOL SWABS PadM       blood sugar diagnostic (ONE TOUCH ULTRA TEST) Strp USE ONE STRIP TO CHECK GLUCOSE EVERY  each 11    brimonidine 0.2% (ALPHAGAN) 0.2 % Drop INSTILL 1 DROP INTO EACH EYE TWICE DAILY  4    clopidogrel (PLAVIX) 75 mg tablet       ergocalciferol, vitamin D2, (VITAMIN D ORAL) Take by mouth.      FLUZONE HIGH-DOSE 2019-20, PF, 180 mcg/0.5 mL Syrg PHARMACIST ADMINISTERED IMMUNIZATION ADMINISTERED AT TIME OF DISPENSING  0     gabapentin (NEURONTIN) 600 MG tablet Take 1 tablet (600 mg total) by mouth 2 (two) times daily. 180 tablet 2    metFORMIN (GLUCOPHAGE) 1000 MG tablet Take 1,000 mg by mouth 2 (two) times daily with meals.      metoprolol succinate (TOPROL XL) 25 MG 24 hr tablet Take 25 mg by mouth once daily.      pantoprazole (PROTONIX) 40 MG tablet Take 40 mg by mouth once daily.      pravastatin (PRAVACHOL) 40 MG tablet TAKE 1 TABLET EVERY DAY 90 tablet 3    imipramine (TOFRANIL) 25 MG tablet Take 1 tablet (25 mg total) by mouth 3 (three) times daily. 90 tablet 11     Facility-Administered Encounter Medications as of 9/20/2019   Medication Dose Route Frequency Provider Last Rate Last Dose    leuprolide (6 month) injection 45 mg  45 mg Intramuscular Q6 Months Catarino Mota MD         Review of Systems   General ROS: GENERAL:  No weight gain or loss  SKIN:  No rashes or lacerations  HEAD:  No headaches  EYES:  No exophthalmos, jaundice or blindness  EARS:  No dizziness, tinnitus or hearing loss  NOSE:  No changes in smell  MOUTH & THROAT:  No dyskinetic movements or obvious goiter  CHEST:  No shortness of breath, hyperventilation or cough  CARDIOVASCULAR:  No tachycardia or chest pain  ABDOMEN:  No nausea, vomiting, pain, constipation or diarrhea  URINARY:  No frequency, dysuria or sexual dysfunction  ENDOCRINE:  No polydipsia, polyuria  MUSCULOSKELETAL:  No pain or stiffness of the joints  NEUROLOGIC:  No weakness, sensory changes, seizures, confusion, memory loss, tremor or other abnormal movements  Physical Exam     Vitals:    09/20/19 1030   BP: 124/86   Pulse: 77     General Appearance:  Alert, cooperative, no distress, appears stated age   Head:  Normocephalic, without obvious abnormality, atraumatic   Eyes:  PERRL, conjunctiva/corneas clear, EOM's intact, fundi benign, both eyes   Ears:  Normal TM's and external ear canals, both ears   Nose: Nares normal, septum midline, mucosa normal, no drainage or sinus tenderness    Throat: Lips, mucosa, and tongue normal; teeth and gums normal   Neck: Supple, symmetrical, trachea midline, no adenopathy, thyroid: not enlarged, symmetric, no tenderness/mass/nodules, no carotid bruit or JVD   Back:   Symmetric, no curvature, ROM normal, no CVA tenderness   Lungs:   Clear to auscultation bilaterally, respirations unlabored   Chest Wall:  No tenderness or deformity   Heart:  Regular rate and rhythm, S1, S2 normal, no murmur, rub or gallop   Abdomen:   Soft, non-tender, bowel sounds active all four quadrants,  no masses, no organomegaly  Ileostomy in tact.   Genitalia:  Normal male, normal testicles, normal epididymis, normal vas palpated.       Extremities: Extremities normal, atraumatic, no cyanosis or edema   Pulses: 2+ and symmetric   Skin: Skin color, texture, turgor normal, no rashes or lesions   Lymph nodes: Cervical, supraclavicular, and axillary nodes normal   Neurologic: Normal     Prostate unable to check.     PVR 0 ml per bladder scan.    LABS:  Lab Results   Component Value Date    PSA 2.1 09/15/2014    PSA 2.16 05/13/2013    PSA 1.2 03/19/2012    PSADIAG 6.3 (H) 08/16/2019    PSADIAG 4.1 (H) 11/21/2018    PSADIAG 3.3 06/15/2018     Lab Results   Component Value Date    CREATININE 1.3 09/18/2019    CREATININE 1.7 (H) 09/06/2019    CREATININE 1.7 (H) 09/03/2019     MRI of prostate 12/9/18  Redemonstration of a focal lesion along the posterior margin of the TURP resection cavity concerning for prostatic cancer.  Lesion is grossly unchanged from examination dated 06/19/2018.    Overall Assessment:    PIRADS 4 (clinically significant cancer is likely to be present)    Number of targets created for potential MR/US fusion biopsy    Residual prostatic tissue: 1    TURP Pathology 1/23/19  SPECIMEN  1) Right prostate chips.  FINAL PATHOLOGIC DIAGNOSIS  Right prostate gland, transurethral resection:  - Benign prostatic tissue with nodular stromal hyperplasia  - Negative for  malignancy    Assessment and Plan:  Diagnoses and all orders for this visit:    Elevated PSA    S/P TURP    he was on Finasteride prior to TURP.  His PSA 4.1 on finasteride could have been 8.2 without finasteride.  Now without finasteride, his PSA is 6.3.  So his PSA 6.3 may indicate lower than his previous PSA.    However, his PSA 6.3 is still elevated to be abnormal.    So we decided to repeat MRI of prostate again and determine whether or not a lesion of the transitional area or prostate is still present.  TURP specimen did not reveal any malignancy.    MRI on 8/20/19 showed PIRAD 4 lesion at the right posterior peripheral area.  No extracapsular disease noted.    So we again discussed options of management.  First may consider a CT guided perineal prostate biopsy by IR, not not sure how feasible and practical it is.  He may consider a treatment since he is worried and concerned without a definite tissue diagnosis.  Then his choice will be either XRT or ADT.    I will refer this pt to Dr. Narvaez for second opinion.    continue imipramine TID prn for ZEB.    I spent 40 minutes with the patient of which more than half was spent in direct consultation with the patient in regards to our treatment and plan.      Follow-up:  Follow up see Dr. Narvaez.

## 2019-09-23 ENCOUNTER — TELEPHONE (OUTPATIENT)
Dept: NEPHROLOGY | Facility: CLINIC | Age: 69
End: 2019-09-23

## 2019-09-23 NOTE — TELEPHONE ENCOUNTER
"Patient Calls     You  Bertin Lyon DO Just now (11:50 AM)      Ok, just told him you said "no pedialyte". He said okay.    Routing comment       Bertin Lyon DO routed conversation to Sonali Denton Staff 16 minutes ago (11:34 AM)      Bertin Lyon DO 16 minutes ago (11:34 AM)         No pedialyte please.         Documentation       Told pt. That Dr. Lyon said "no pedialyte". He said okay.    "

## 2019-09-23 NOTE — TELEPHONE ENCOUNTER
"Patient Calls     You  Bertin Lyon DO Just now (11:50 AM)      Ok, just told him you said "no pedialyte". He said okay.    Routing comment       Bertin Lyon DO routed conversation to Sonali [facility] Staff 16 minutes ago (11:34 AM)      Bertin Lyon DO 16 minutes ago (11:34 AM)         No pedialyte please.         Documentation           "

## 2019-09-23 NOTE — TELEPHONE ENCOUNTER
1 hour ago (10:17 AM)      Bertin Lyon DO 1 hour ago (10:17 AM)         Potassium much improved-con't to follow low potassium diet.          Documentation

## 2019-10-03 RX ORDER — PRAVASTATIN SODIUM 40 MG/1
TABLET ORAL
Qty: 90 TABLET | Refills: 3 | Status: SHIPPED | OUTPATIENT
Start: 2019-10-03 | End: 2019-11-05

## 2019-10-07 ENCOUNTER — PATIENT OUTREACH (OUTPATIENT)
Dept: ADMINISTRATIVE | Facility: OTHER | Age: 69
End: 2019-10-07

## 2019-10-08 ENCOUNTER — OFFICE VISIT (OUTPATIENT)
Dept: UROLOGY | Facility: CLINIC | Age: 69
End: 2019-10-08
Payer: MEDICARE

## 2019-10-08 ENCOUNTER — LAB VISIT (OUTPATIENT)
Dept: LAB | Facility: HOSPITAL | Age: 69
End: 2019-10-08
Attending: UROLOGY
Payer: MEDICARE

## 2019-10-08 VITALS
SYSTOLIC BLOOD PRESSURE: 145 MMHG | HEART RATE: 118 BPM | HEIGHT: 74 IN | WEIGHT: 202 LBS | BODY MASS INDEX: 25.93 KG/M2 | DIASTOLIC BLOOD PRESSURE: 98 MMHG

## 2019-10-08 DIAGNOSIS — R97.20 ELEVATED PSA: ICD-10-CM

## 2019-10-08 DIAGNOSIS — R97.20 ELEVATED PSA: Primary | ICD-10-CM

## 2019-10-08 PROCEDURE — 99999 PR PBB SHADOW E&M-EST. PATIENT-LVL III: CPT | Mod: PBBFAC,,, | Performed by: UROLOGY

## 2019-10-08 PROCEDURE — 36415 COLL VENOUS BLD VENIPUNCTURE: CPT

## 2019-10-08 PROCEDURE — 3080F DIAST BP >= 90 MM HG: CPT | Mod: CPTII,S$GLB,, | Performed by: UROLOGY

## 2019-10-08 PROCEDURE — 3080F PR MOST RECENT DIASTOLIC BLOOD PRESSURE >= 90 MM HG: ICD-10-PCS | Mod: CPTII,S$GLB,, | Performed by: UROLOGY

## 2019-10-08 PROCEDURE — 3077F PR MOST RECENT SYSTOLIC BLOOD PRESSURE >= 140 MM HG: ICD-10-PCS | Mod: CPTII,S$GLB,, | Performed by: UROLOGY

## 2019-10-08 PROCEDURE — 3077F SYST BP >= 140 MM HG: CPT | Mod: CPTII,S$GLB,, | Performed by: UROLOGY

## 2019-10-08 PROCEDURE — 99214 PR OFFICE/OUTPT VISIT, EST, LEVL IV, 30-39 MIN: ICD-10-PCS | Mod: S$GLB,,, | Performed by: UROLOGY

## 2019-10-08 PROCEDURE — 99999 PR PBB SHADOW E&M-EST. PATIENT-LVL III: ICD-10-PCS | Mod: PBBFAC,,, | Performed by: UROLOGY

## 2019-10-08 PROCEDURE — 84153 ASSAY OF PSA TOTAL: CPT

## 2019-10-08 PROCEDURE — 1101F PR PT FALLS ASSESS DOC 0-1 FALLS W/OUT INJ PAST YR: ICD-10-PCS | Mod: CPTII,S$GLB,, | Performed by: UROLOGY

## 2019-10-08 PROCEDURE — 99214 OFFICE O/P EST MOD 30 MIN: CPT | Mod: S$GLB,,, | Performed by: UROLOGY

## 2019-10-08 PROCEDURE — 86316 IMMUNOASSAY TUMOR OTHER: CPT

## 2019-10-08 PROCEDURE — 1101F PT FALLS ASSESS-DOCD LE1/YR: CPT | Mod: CPTII,S$GLB,, | Performed by: UROLOGY

## 2019-10-08 NOTE — LETTER
October 8, 2019        Poli Gonzalez MD  [address] [place] [address]             [facility]  [address]  [place] [address]  Phone: [phone]   Patient: Jarrett Charlton Jr.   MR Number: [id number]   YOB: 1950   Date of Visit: 10/8/2019       Dear Dr. Gonzalez:    Thank you for referring Jarrett Charlton to me for evaluation. Attached you will find relevant portions of my assessment and plan of care.    If you have questions, please do not hesitate to call me. I look forward to following Jarrett Charlton along with you.    Sincerely,      Andrew Narvaez MD            CC  No Recipients    Enclosure

## 2019-10-08 NOTE — PROGRESS NOTES
Clinic Note  10/8/2019      Subjective:         Chief Complaint:   RYAN Charlton Jr. is a 69 y.o. male with a history of elevated PSA, negative TUR biopsy in January. History of APR for crohn's disease.  MRI- 15 ccs. PI-RADS 4, 1 cm lesion, right base PZ. Negative extra prostatic extension,NVB,SV nodes.        Lab Results   Component Value Date    PSA 2.1 09/15/2014    PSA 2.16 05/13/2013    PSA 1.2 03/19/2012    PSA 1.0 02/19/2010    PSA 1.4 02/17/2009    PSA 1.1 04/30/2007    PSA 1.0 05/12/2006    PSA 1.0 04/22/2005    PSADIAG 6.3 (H) 08/16/2019    PSADIAG 4.1 (H) 11/21/2018    PSADIAG 3.3 06/15/2018    PSADIAG 3.1 05/08/2018    PSADIAG 1.7 06/05/2017    PSADIAG 1.4 06/06/2016    PSADIAG 2.1 10/16/2015    PSADIAG 4.3 (H) 06/19/2015      Past Medical History:   Diagnosis Date    Basal cell carcinoma     BPH (benign prostatic hyperplasia)     s/p TURP    Carotid stenosis     Chronic kidney disease     Claudication     DDD (degenerative disc disease) 10/21/2013    Diabetes mellitus with renal complications     Disc disease, degenerative, cervical     GERD (gastroesophageal reflux disease)     Gout, chronic     History of ulcerative colitis     s/p colectomy and ileostomy    HLD (hyperlipidemia)     HTN (hypertension)     Ileostomy in place 1982    RBBB     Squamous cell carcinoma 03/08/2018    Left superior helix near insertion    Squamous cell carcinoma 04/12/2018    Left forearm x 5    Ventricular tachycardia      Family History   Problem Relation Age of Onset    Melanoma Neg Hx     Hypertension Neg Hx     Diabetes Neg Hx     Arthritis Neg Hx      Social History     Socioeconomic History    Marital status:      Spouse name: Not on file    Number of children: 1    Years of education: Not on file    Highest education level: Not on file   Occupational History    Occupation:  x 44 years     Comment: Retired    Occupation: [employer]   Social Needs     Financial resource strain: Not on file    Food insecurity:     Worry: Not on file     Inability: Not on file    Transportation needs:     Medical: Not on file     Non-medical: Not on file   Tobacco Use    Smoking status: Never Smoker    Smokeless tobacco: Never Used   Substance and Sexual Activity    Alcohol use: No    Drug use: No    Sexual activity: Not on file   Lifestyle    Physical activity:     Days per week: Not on file     Minutes per session: Not on file    Stress: Not on file   Relationships    Social connections:     Talks on phone: Not on file     Gets together: Not on file     Attends Gnosticist service: Not on file     Active member of club or organization: Not on file     Attends meetings of clubs or organizations: Not on file     Relationship status: Not on file   Other Topics Concern    Not on file   Social History Narrative    Not on file     Past Surgical History:   Procedure Laterality Date    cardiac stents      CATARACT EXTRACTION Bilateral     CERVICAL FUSION      colectomy and ileostomy  1985    TRANSURETHRAL RESECTION OF PROSTATE (TURP) WITHOUT USE OF LASER N/A 1/23/2019    Procedure: TURP, WITHOUT USING LASER BIPOLAR;  Surgeon: Catarino Mota MD;  Location: [facility] OR [address] [place] [address];  Service: Urology;  Laterality: N/A;  1.5 HOURS     Patient Active Problem List   Diagnosis    Type 2 diabetes mellitus with stage 2 chronic kidney disease, without long-term current use of insulin    Essential hypertension    Chronic renal impairment    Idiopathic chronic gout of multiple sites with tophus    HLD (hyperlipidemia)    BPH (benign prostatic hyperplasia)    Disc disease, degenerative, cervical    GERD (gastroesophageal reflux disease)    History of ulcerative colitis    CKD (chronic kidney disease) stage 2, GFR 60-89 ml/min    Proteinuria    DDD (degenerative disc disease)    Generalized osteoarthritis    Leg pain    Elevated PSA    S/P TURP    Bilateral low back pain  "   Sacroiliac joint pain    CAD (coronary artery disease)    Presence of arterial stent    Abnormal LFTs    Type 2 diabetes mellitus without complication, without long-term current use of insulin    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   HENT: Negative for nosebleeds.    Respiratory: Negative for shortness of breath and wheezing.    Cardiovascular: Negative for chest pain, palpitations and leg swelling.   Gastrointestinal: Negative for abdominal distention, abdominal pain, constipation, diarrhea, nausea and vomiting.   Genitourinary: Negative for dysuria and hematuria.   Musculoskeletal: Negative for arthralgias and back pain.   Skin: Negative for pallor.   Neurological: Negative for dizziness, seizures and syncope.   Hematological: Negative for adenopathy.   Psychiatric/Behavioral: Negative for dysphoric mood.         Objective:      BP (!) 145/98   Pulse (!) 118   Ht 6' 2" (1.88 m)   Wt 91.6 kg (202 lb)   BMI 25.94 kg/m²   Estimated body mass index is 25.94 kg/m² as calculated from the following:    Height as of this encounter: 6' 2" (1.88 m).    Weight as of this encounter: 91.6 kg (202 lb).  Physical Exam   Constitutional: He is oriented to person, place, and time. He appears well-developed and well-nourished. No distress.   HENT:   Head: Atraumatic.   Neck: No tracheal deviation present.   Cardiovascular: Normal rate.    Pulmonary/Chest: Effort normal. No respiratory distress. He has no wheezes.   Abdominal: Soft. Bowel sounds are normal. He exhibits no distension " and no mass. There is no tenderness. There is no rebound and no guarding.   Neurological: He is alert and oriented to person, place, and time.   Skin: Skin is warm and dry. He is not diaphoretic.     Psychiatric: He has a normal mood and affect. His behavior is normal. Judgment and thought content normal.         Assessment and Plan:           Problem List Items Addressed This Visit     Elevated PSA - Primary          Follow up:   PHI test today.  Bladder sonogram, assess prostate visualization for possible transperineal biopsy.  Reviewed NIH risk.    Andrew Narvaez

## 2019-10-09 LAB
-2 PROPSA (PHI): 14.4 PG/ML
FREE PSA (PHI): 0.9 NG/ML
PROSTATE HEALTH INDEX VALUE (PHI): 43.2
PSA FREE MFR SERPL: 12 %
PSA SERPL-MCNC: 7.3 NG/ML

## 2019-10-15 ENCOUNTER — HOSPITAL ENCOUNTER (OUTPATIENT)
Dept: RADIOLOGY | Facility: HOSPITAL | Age: 69
Discharge: HOME | End: 2019-10-15
Attending: UROLOGY
Payer: MEDICARE

## 2019-10-15 DIAGNOSIS — R97.20 ELEVATED PSA: ICD-10-CM

## 2019-10-15 PROCEDURE — 76857 US BLADDER: ICD-10-PCS | Mod: 26,,, | Performed by: RADIOLOGY

## 2019-10-15 PROCEDURE — 76857 US EXAM PELVIC LIMITED: CPT | Mod: TC

## 2019-10-15 PROCEDURE — 76857 US EXAM PELVIC LIMITED: CPT | Mod: 26,,, | Performed by: RADIOLOGY

## 2019-10-28 ENCOUNTER — TELEPHONE (OUTPATIENT)
Dept: NEPHROLOGY | Facility: CLINIC | Age: 69
End: 2019-10-28

## 2019-10-28 NOTE — TELEPHONE ENCOUNTER
----- Message from Kelly Jean sent at 10/28/2019  1:30 PM CDT -----  Contact: pt   Pt calling to schedule a follow up appt.  Can you schdule pt for this appt.  Call back # [phone]

## 2019-10-28 NOTE — TELEPHONE ENCOUNTER
----- Message from Kelly Jean sent at 10/28/2019  1:30 PM CDT -----  Contact: pt   Pt calling to schedule a follow up appt.  Can you schdule pt for this appt.  Call back # [phone]

## 2019-10-28 NOTE — TELEPHONE ENCOUNTER
He just told me is repeating a blood level on on Thursday--reviewed with him a low potassium diet--he said he drinks lots of orange juice and eats cherry tomatoes by the handful. Instructed him to stop those now.

## 2019-10-29 ENCOUNTER — TELEPHONE (OUTPATIENT)
Dept: NEPHROLOGY | Facility: CLINIC | Age: 69
End: 2019-10-29

## 2019-10-29 NOTE — TELEPHONE ENCOUNTER
I reviewed his labs.  His potassium is high at 5.9.  He should hydrate well.  His creatinine is stable at 1.5.  Low K diet.  Please let me know when you get his labs this week to review.  I had just seen in him in august.  Please schedule him with jaison when she has an opening.

## 2019-10-30 ENCOUNTER — TELEPHONE (OUTPATIENT)
Dept: NEPHROLOGY | Facility: CLINIC | Age: 69
End: 2019-10-30

## 2019-11-01 PROBLEM — E87.5 HYPERKALEMIA: Status: ACTIVE | Noted: 2019-11-01

## 2019-11-01 NOTE — PROGRESS NOTES
"Subjective:       Patient ID: Jarrett Charlton Jr. is a 69 y.o. White male who presents for follow-up evaluation of   CKD, hyperkalemia    HPI   Patient is new to me. Last seen by Dr. Lyon in Aug 2019.  Prior pertinent chart reviewed since this is patient's first appointment with me.    Patient presents for f/u of CKD and evaluation of hyperkalemia.  Baseline creatinine of 1.3-1.5 c frequent increases to 1.6-1.8 range an occasional AKIs c higher sCr.  According to chart review, most recent sCr was 1.5 at outside labs. K was 5.9.  Was told to stop eating cherry tomatoes and drinking orange juice and Pedialyte. Potassium finder mailed to him.  States he was put on 250 mg of magnesium, then 500 mg magnesium by eye doctor, but has stopped since.  No recent gout attacks.    Home BPs: 110s-120s/80s. Reports high BP last night, 164/105; took "2.5 mg of wife's clonidine" for this (possibly amlodipine?). Had low SBP in 70/50s this morning. Said no dizziness. Sat on couch with feet up for 3 hours. BP WNL in clinic.    Significant hx includes Hypertension for 5 years, DM for 3 yrs, gout, ulcerative colitis with ostomy and hyperlipidemia.     The patient denies taking NSAIDs, herbal supplements, or new antibiotics, recreational drugs, recent episode of dehydration, diarrhea, nausea or vomiting, acute illness, hospitalization. Patient says he had recent IV contrast.    Social history: retired, wife wheelchair bound  Significant family hx includes: no known kidney disease in family.    Last renal US: Sept 2019, reviewed. Simple left renal cyst.    Review of Systems   Constitutional: Negative for appetite change, fatigue and fever.   Respiratory: Negative for cough, shortness of breath, wheezing and stridor.    Cardiovascular: Negative for chest pain, palpitations and leg swelling.   Gastrointestinal: Negative for constipation, diarrhea, nausea and vomiting.   Genitourinary: Negative for difficulty urinating, dysuria, flank " pain, frequency, hematuria and urgency.   Musculoskeletal: Negative for arthralgias, joint swelling and myalgias.   Neurological: Negative for dizziness, syncope, weakness and light-headedness.       Objective:       Blood pressure 124/76, pulse 70, weight 93 kg (205 lb), SpO2 97 %.  Physical Exam   Constitutional: He is oriented to person, place, and time. He appears well-developed and well-nourished. No distress.   Neck: Neck supple.   Cardiovascular: Normal rate, regular rhythm and normal heart sounds. Exam reveals no gallop and no friction rub.   No murmur heard.  Pulmonary/Chest: Effort normal and breath sounds normal. No stridor. No respiratory distress. He has no wheezes. He has no rales.   Abdominal: Soft. Bowel sounds are normal. He exhibits no distension. There is no tenderness.   No cva tenderness   Musculoskeletal: He exhibits edema (trace to BLE).   Neurological: He is alert and oriented to person, place, and time.   Skin: Skin is warm and dry. He is not diaphoretic. No pallor.   Bronzed skin to calves bilaterally   Psychiatric: He has a normal mood and affect. His behavior is normal.   Vitals reviewed.        UPCR: 0.07 g/g  Outside labs 10/24/19  Crt: 1.5 mg/dL  Hgb: 15.2 g/dL  CO2: 20 mmol/L  K: 5.9 mmol/L  A1c: 7.1%    Assessment:       1. Hyperkalemia    2. CKD (chronic kidney disease) stage 3, GFR 30-59 ml/min    3. Essential hypertension        Plan:     CKD stage 3A/B c eGFR 55 mL/min - 2/2 age-related nephron loss, HTN, previous NSAID use for gout, dehydration episodes c high ostomy output.  No longer using NSAIDs.  Encouraged hydration, especially at times of higher ostomy output.  Discussed controlling BP, controlling blood sugar, staying hydrated and avoiding NSAIDs are important to prevent progression.  Will repeat BMP today.    UPCR No significant proteinuria. ACEI stopped because BP previously dropping too low.   Acid-base Bicarb 20, but WNL for outside lab. Recently hyperkalemic. Given K  finder in clinic today. Do not drink electrolyte drinks, like pedialyte. Repeating labs today. Will also check Mg level.    Renal osteodystrophy PTH WNL. Ca WNL. On ergocalciferol.   Anemia WNL   DM At goal for CKD. On metformin.   Lipid Management On statin per PCP     HTN - WNL on metoprolol succinate 25mg. BPs were dropping too low on lisinopril. Managed by cardiologist.  Educated patient not to take wife's medication, even if it is a medication he was previously prescribed. Educated him that both high BPs and low BPs are dangerous.  If he is concerned about high BP in the future, he should contact his doctor, not take medication that is not prescribed to him.    All questions patient had were answered.  Asked if further questions. None. F/u in clinic 4 mos  with labs and urine prior to next visit or sooner if needed.  ER for emergency concerns.    Summary of Plan:  1. Repeat BMP, Mg today  2. Encouraged hydration  3. Moderate K in diet  4. RTC in 4 mos

## 2019-11-05 ENCOUNTER — LAB VISIT (OUTPATIENT)
Dept: LAB | Facility: HOSPITAL | Age: 69
End: 2019-11-05
Payer: MEDICARE

## 2019-11-05 ENCOUNTER — DOCUMENTATION ONLY (OUTPATIENT)
Dept: NEPHROLOGY | Facility: CLINIC | Age: 69
End: 2019-11-05

## 2019-11-05 ENCOUNTER — OFFICE VISIT (OUTPATIENT)
Dept: NEPHROLOGY | Facility: CLINIC | Age: 69
End: 2019-11-05
Payer: MEDICARE

## 2019-11-05 VITALS
DIASTOLIC BLOOD PRESSURE: 76 MMHG | BODY MASS INDEX: 26.32 KG/M2 | SYSTOLIC BLOOD PRESSURE: 124 MMHG | HEART RATE: 70 BPM | OXYGEN SATURATION: 97 % | WEIGHT: 205 LBS

## 2019-11-05 DIAGNOSIS — E87.5 HYPERKALEMIA: ICD-10-CM

## 2019-11-05 DIAGNOSIS — E83.42 HYPOMAGNESEMIA: Primary | ICD-10-CM

## 2019-11-05 DIAGNOSIS — I10 ESSENTIAL HYPERTENSION: ICD-10-CM

## 2019-11-05 DIAGNOSIS — E87.5 HYPERKALEMIA: Primary | ICD-10-CM

## 2019-11-05 DIAGNOSIS — N18.30 CKD (CHRONIC KIDNEY DISEASE) STAGE 3, GFR 30-59 ML/MIN: ICD-10-CM

## 2019-11-05 LAB
ANION GAP SERPL CALC-SCNC: 9 MMOL/L (ref 8–16)
BUN SERPL-MCNC: 23 MG/DL (ref 8–23)
CALCIUM SERPL-MCNC: 9.2 MG/DL (ref 8.7–10.5)
CHLORIDE SERPL-SCNC: 110 MMOL/L (ref 95–110)
CO2 SERPL-SCNC: 24 MMOL/L (ref 23–29)
CREAT SERPL-MCNC: 1.4 MG/DL (ref 0.5–1.4)
EST. GFR  (AFRICAN AMERICAN): 58.4 ML/MIN/1.73 M^2
EST. GFR  (NON AFRICAN AMERICAN): 50.5 ML/MIN/1.73 M^2
GLUCOSE SERPL-MCNC: 149 MG/DL (ref 70–110)
MAGNESIUM SERPL-MCNC: 1.4 MG/DL (ref 1.6–2.6)
POTASSIUM SERPL-SCNC: 4.7 MMOL/L (ref 3.5–5.1)
SODIUM SERPL-SCNC: 143 MMOL/L (ref 136–145)

## 2019-11-05 PROCEDURE — 3074F SYST BP LT 130 MM HG: CPT | Mod: CPTII,S$GLB,, | Performed by: NURSE PRACTITIONER

## 2019-11-05 PROCEDURE — 3078F DIAST BP <80 MM HG: CPT | Mod: CPTII,S$GLB,, | Performed by: NURSE PRACTITIONER

## 2019-11-05 PROCEDURE — 36415 COLL VENOUS BLD VENIPUNCTURE: CPT

## 2019-11-05 PROCEDURE — 99999 PR PBB SHADOW E&M-EST. PATIENT-LVL III: ICD-10-PCS | Mod: PBBFAC,,, | Performed by: NURSE PRACTITIONER

## 2019-11-05 PROCEDURE — 99999 PR PBB SHADOW E&M-EST. PATIENT-LVL III: CPT | Mod: PBBFAC,,, | Performed by: NURSE PRACTITIONER

## 2019-11-05 PROCEDURE — 1101F PR PT FALLS ASSESS DOC 0-1 FALLS W/OUT INJ PAST YR: ICD-10-PCS | Mod: CPTII,S$GLB,, | Performed by: NURSE PRACTITIONER

## 2019-11-05 PROCEDURE — 3078F PR MOST RECENT DIASTOLIC BLOOD PRESSURE < 80 MM HG: ICD-10-PCS | Mod: CPTII,S$GLB,, | Performed by: NURSE PRACTITIONER

## 2019-11-05 PROCEDURE — 80048 BASIC METABOLIC PNL TOTAL CA: CPT

## 2019-11-05 PROCEDURE — 1101F PT FALLS ASSESS-DOCD LE1/YR: CPT | Mod: CPTII,S$GLB,, | Performed by: NURSE PRACTITIONER

## 2019-11-05 PROCEDURE — 99214 PR OFFICE/OUTPT VISIT, EST, LEVL IV, 30-39 MIN: ICD-10-PCS | Mod: S$GLB,,, | Performed by: NURSE PRACTITIONER

## 2019-11-05 PROCEDURE — 99214 OFFICE O/P EST MOD 30 MIN: CPT | Mod: S$GLB,,, | Performed by: NURSE PRACTITIONER

## 2019-11-05 PROCEDURE — 83735 ASSAY OF MAGNESIUM: CPT

## 2019-11-05 PROCEDURE — 3074F PR MOST RECENT SYSTOLIC BLOOD PRESSURE < 130 MM HG: ICD-10-PCS | Mod: CPTII,S$GLB,, | Performed by: NURSE PRACTITIONER

## 2019-11-05 RX ORDER — MIDODRINE HYDROCHLORIDE 2.5 MG/1
2.5 TABLET ORAL 3 TIMES DAILY
COMMUNITY
End: 2020-02-06

## 2019-11-05 RX ORDER — PRAVASTATIN SODIUM 40 MG/1
80 TABLET ORAL NIGHTLY
COMMUNITY
End: 2019-11-05

## 2019-11-05 RX ORDER — LANOLIN ALCOHOL/MO/W.PET/CERES
400 CREAM (GRAM) TOPICAL DAILY
COMMUNITY
End: 2020-02-06

## 2019-11-05 RX ORDER — PRAVASTATIN SODIUM 80 MG/1
80 TABLET ORAL NIGHTLY
COMMUNITY
End: 2020-05-21

## 2019-11-05 NOTE — PROGRESS NOTES
Spoke to patient on phone after reviewing labs. Patient said he is actually waiting for cardiologist to call him back about hypotension. Says his BP is low again. SBP 80s-90s. Says he has some mild dizziness.  Informed him he should go to ER in the setting of symptomatic hypotension, especially if he does not hear back from cardiologist quickly. Informed him not to drive himself. Informed him he should not take any more BP medication at this time until speaking to cardiologist or being evaluated in ER.    Also informed him of results of his labs. K is WNL. Magnesium is low. Patient to purchase magnesium oxide 400 mg OTC and take one daily.  He is to d/c it and inform us if he notices increase/worsened/different output from his ostomy.  Will repeat electrolytes in 1 mos.    Patient verbalized understanding.

## 2019-11-05 NOTE — PATIENT INSTRUCTIONS
Avoid electrolyte drinks like pedialyte, gatorade, powerade, etc.    Continue low sodium, low potassium diet.  Avoid NSAID (ibuprofen, advil, motrin, aleve, naprosyn, naproxen, [place], Goody's Powder). Tylenol is okay.  Avoid bactrim/ IV contrast/ gadolinium/ aminoglycoside where possible.  Avoid herbal supplements.  Stay hydrated.  Return to clinic in 4 months with labs (bloodwork and urine) or sooner if needed.  Go to the ER with any emergency concerns.      Discharge Instructions: Eating a Low-Potassium Diet  Your health care provider has prescribed a low-potassium diet for you. This kind of diet is advised for people who have certain kidney problems. Potassium is needed for muscle function. But too much potassium is a health risk. Potassium is found in many foods. Read below to find out how to change your diet.  Foods to limit  Some foods are high in potassium. Limit your daily intake of the foods in the list below.  · Fruits: apricots (canned and fresh), bananas, cantaloupe, honeydew melon, kiwi, nectarines, pomegranates, oranges, orange juice, pears, dried fruits (apricots, dates, figs, prunes), and prune juice  · Vegetables: asparagus, avocado, artichoke, bamboo shoots, beets, brussels sprouts, cabbage, celery, chard, okra, potatoes (white and sweet), pumpkin, rutabaga, spinach (cooked), squash, tomato, tomato sauce, tomato juice, and vegetable juice cocktail  · Legumes: black-eyed peas, chickpeas, lentils, lima beans, navy beans, red kidney beans, soybeans, and split peas  · Nuts and seeds: almonds, Brazil nuts, cashews, peanuts, peanut butter, pecans, pumpkin seeds, sunflower seeds, and walnuts  · Breads and cereals: bran and whole-grain products  · Dairy foods: milk, cheese, ice cream, yogurt  · Animal protein: all forms of animal protein  · Other: chocolate, cocoa, coconut milk, and molasses  Tips  · Ask your health care provider how much potassium you are allowed each day. This will help you figure out  serving sizes for your needs.  · Check labels for potassium. It may be listed as potassium chloride.  · Do not use salt substitutes. These often have potassium in them.  · Cook frozen fruits and vegetables in water. Rinse and drain them well before eating.  · Drain liquid from all canned fruits and vegetables. Rinse them before eating.  · Reduce the potassium in potatoes. Peel them, slice thinly, and soak in water for at least 4 hours.  · Reduce the potassium in green leafy vegetables. Soak them in water for at least 4 hours.  · Eat white rice and refined white flour products. These include white bread, pasta, and grits.  Follow-up  Make a follow-up appointment as advised by our staff.  When to call your health care provider  Call your health care provider right away if you have any of the following:  · Fatigue  · Shortness of breath  · Chest pain  · Slow, irregular heartbeat  · Fainting  · Dizziness  · Lightheadedness  · Confusion   Date Last Reviewed: 6/21/2015  © 7409-3404 [employer] [place]. [address] [place] [address], [place] [address]. All rights reserved. This information is not intended as a substitute for professional medical care. Always follow your healthcare professional's instructions.

## 2019-11-06 ENCOUNTER — OFFICE VISIT (OUTPATIENT)
Dept: INTERVENTIONAL RADIOLOGY/VASCULAR | Facility: CLINIC | Age: 69
End: 2019-11-06
Payer: MEDICARE

## 2019-11-06 ENCOUNTER — OFFICE VISIT (OUTPATIENT)
Dept: RHEUMATOLOGY | Facility: CLINIC | Age: 69
End: 2019-11-06
Payer: MEDICARE

## 2019-11-06 ENCOUNTER — TELEPHONE (OUTPATIENT)
Dept: RHEUMATOLOGY | Facility: CLINIC | Age: 69
End: 2019-11-06

## 2019-11-06 ENCOUNTER — LAB VISIT (OUTPATIENT)
Dept: LAB | Facility: HOSPITAL | Age: 69
End: 2019-11-06
Payer: MEDICARE

## 2019-11-06 VITALS
SYSTOLIC BLOOD PRESSURE: 133 MMHG | HEIGHT: 74 IN | HEART RATE: 89 BPM | BODY MASS INDEX: 26.31 KG/M2 | DIASTOLIC BLOOD PRESSURE: 86 MMHG | WEIGHT: 205 LBS

## 2019-11-06 VITALS
DIASTOLIC BLOOD PRESSURE: 86 MMHG | SYSTOLIC BLOOD PRESSURE: 136 MMHG | HEART RATE: 89 BPM | BODY MASS INDEX: 26.31 KG/M2 | WEIGHT: 205 LBS | HEIGHT: 74 IN

## 2019-11-06 DIAGNOSIS — R97.20 ELEVATED PSA: ICD-10-CM

## 2019-11-06 DIAGNOSIS — M1A.09X1 IDIOPATHIC CHRONIC GOUT OF MULTIPLE SITES WITH TOPHUS: Primary | ICD-10-CM

## 2019-11-06 DIAGNOSIS — R97.20 ELEVATED PSA: Primary | ICD-10-CM

## 2019-11-06 DIAGNOSIS — M15.9 GENERALIZED OSTEOARTHRITIS: ICD-10-CM

## 2019-11-06 DIAGNOSIS — D49.9 NEOPLASM: ICD-10-CM

## 2019-11-06 DIAGNOSIS — Z90.79 S/P TURP: ICD-10-CM

## 2019-11-06 DIAGNOSIS — N18.30 CHRONIC RENAL IMPAIRMENT, STAGE 3 (MODERATE): ICD-10-CM

## 2019-11-06 LAB
BASOPHILS # BLD AUTO: 0.03 K/UL (ref 0–0.2)
BASOPHILS NFR BLD: 0.4 % (ref 0–1.9)
DIFFERENTIAL METHOD: ABNORMAL
EOSINOPHIL # BLD AUTO: 0.1 K/UL (ref 0–0.5)
EOSINOPHIL NFR BLD: 1.1 % (ref 0–8)
ERYTHROCYTE [DISTWIDTH] IN BLOOD BY AUTOMATED COUNT: 14.5 % (ref 11.5–14.5)
HCT VFR BLD AUTO: 42.9 % (ref 40–54)
HGB BLD-MCNC: 13.7 G/DL (ref 14–18)
IMM GRANULOCYTES # BLD AUTO: 0.03 K/UL (ref 0–0.04)
IMM GRANULOCYTES NFR BLD AUTO: 0.4 % (ref 0–0.5)
INR PPP: 1.1 (ref 0.8–1.2)
LYMPHOCYTES # BLD AUTO: 1.3 K/UL (ref 1–4.8)
LYMPHOCYTES NFR BLD: 16.9 % (ref 18–48)
MCH RBC QN AUTO: 30.4 PG (ref 27–31)
MCHC RBC AUTO-ENTMCNC: 31.9 G/DL (ref 32–36)
MCV RBC AUTO: 95 FL (ref 82–98)
MONOCYTES # BLD AUTO: 0.5 K/UL (ref 0.3–1)
MONOCYTES NFR BLD: 6.3 % (ref 4–15)
NEUTROPHILS # BLD AUTO: 5.6 K/UL (ref 1.8–7.7)
NEUTROPHILS NFR BLD: 74.9 % (ref 38–73)
NRBC BLD-RTO: 0 /100 WBC
PLATELET # BLD AUTO: 134 K/UL (ref 150–350)
PMV BLD AUTO: 10.6 FL (ref 9.2–12.9)
PROTHROMBIN TIME: 10.9 SEC (ref 9–12.5)
RBC # BLD AUTO: 4.5 M/UL (ref 4.6–6.2)
WBC # BLD AUTO: 7.52 K/UL (ref 3.9–12.7)

## 2019-11-06 PROCEDURE — 99213 OFFICE O/P EST LOW 20 MIN: CPT | Mod: S$GLB,,, | Performed by: INTERNAL MEDICINE

## 2019-11-06 PROCEDURE — 3075F PR MOST RECENT SYSTOLIC BLOOD PRESS GE 130-139MM HG: ICD-10-PCS | Mod: CPTII,S$GLB,, | Performed by: INTERNAL MEDICINE

## 2019-11-06 PROCEDURE — 99213 PR OFFICE/OUTPT VISIT, EST, LEVL III, 20-29 MIN: ICD-10-PCS | Mod: S$GLB,,, | Performed by: INTERNAL MEDICINE

## 2019-11-06 PROCEDURE — 3075F SYST BP GE 130 - 139MM HG: CPT | Mod: CPTII,S$GLB,, | Performed by: INTERNAL MEDICINE

## 2019-11-06 PROCEDURE — 1101F PR PT FALLS ASSESS DOC 0-1 FALLS W/OUT INJ PAST YR: ICD-10-PCS | Mod: CPTII,S$GLB,, | Performed by: INTERNAL MEDICINE

## 2019-11-06 PROCEDURE — 36415 COLL VENOUS BLD VENIPUNCTURE: CPT

## 2019-11-06 PROCEDURE — 3079F PR MOST RECENT DIASTOLIC BLOOD PRESSURE 80-89 MM HG: ICD-10-PCS | Mod: CPTII,S$GLB,, | Performed by: FAMILY MEDICINE

## 2019-11-06 PROCEDURE — 85610 PROTHROMBIN TIME: CPT

## 2019-11-06 PROCEDURE — 1101F PT FALLS ASSESS-DOCD LE1/YR: CPT | Mod: CPTII,S$GLB,, | Performed by: INTERNAL MEDICINE

## 2019-11-06 PROCEDURE — 3079F PR MOST RECENT DIASTOLIC BLOOD PRESSURE 80-89 MM HG: ICD-10-PCS | Mod: CPTII,S$GLB,, | Performed by: INTERNAL MEDICINE

## 2019-11-06 PROCEDURE — 99999 PR PBB SHADOW E&M-EST. PATIENT-LVL III: ICD-10-PCS | Mod: PBBFAC,,, | Performed by: FAMILY MEDICINE

## 2019-11-06 PROCEDURE — 3075F SYST BP GE 130 - 139MM HG: CPT | Mod: CPTII,S$GLB,, | Performed by: FAMILY MEDICINE

## 2019-11-06 PROCEDURE — 1101F PR PT FALLS ASSESS DOC 0-1 FALLS W/OUT INJ PAST YR: ICD-10-PCS | Mod: CPTII,S$GLB,, | Performed by: FAMILY MEDICINE

## 2019-11-06 PROCEDURE — 99214 PR OFFICE/OUTPT VISIT, EST, LEVL IV, 30-39 MIN: ICD-10-PCS | Mod: S$GLB,,, | Performed by: FAMILY MEDICINE

## 2019-11-06 PROCEDURE — 99999 PR PBB SHADOW E&M-EST. PATIENT-LVL III: CPT | Mod: PBBFAC,,, | Performed by: FAMILY MEDICINE

## 2019-11-06 PROCEDURE — 85025 COMPLETE CBC W/AUTO DIFF WBC: CPT

## 2019-11-06 PROCEDURE — 3075F PR MOST RECENT SYSTOLIC BLOOD PRESS GE 130-139MM HG: ICD-10-PCS | Mod: CPTII,S$GLB,, | Performed by: FAMILY MEDICINE

## 2019-11-06 PROCEDURE — 99214 OFFICE O/P EST MOD 30 MIN: CPT | Mod: S$GLB,,, | Performed by: FAMILY MEDICINE

## 2019-11-06 PROCEDURE — 3079F DIAST BP 80-89 MM HG: CPT | Mod: CPTII,S$GLB,, | Performed by: INTERNAL MEDICINE

## 2019-11-06 PROCEDURE — 99999 PR PBB SHADOW E&M-EST. PATIENT-LVL III: ICD-10-PCS | Mod: PBBFAC,,, | Performed by: INTERNAL MEDICINE

## 2019-11-06 PROCEDURE — 3079F DIAST BP 80-89 MM HG: CPT | Mod: CPTII,S$GLB,, | Performed by: FAMILY MEDICINE

## 2019-11-06 PROCEDURE — 1101F PT FALLS ASSESS-DOCD LE1/YR: CPT | Mod: CPTII,S$GLB,, | Performed by: FAMILY MEDICINE

## 2019-11-06 PROCEDURE — 99999 PR PBB SHADOW E&M-EST. PATIENT-LVL III: CPT | Mod: PBBFAC,,, | Performed by: INTERNAL MEDICINE

## 2019-11-06 RX ORDER — PREDNISONE 10 MG/1
TABLET ORAL
Qty: 30 TABLET | Refills: 2 | Status: SHIPPED | OUTPATIENT
Start: 2019-11-06 | End: 2020-02-06

## 2019-11-06 NOTE — PROGRESS NOTES
"Subjective:       Patient ID: Jarrett Charlton Jr. is a 69 y.o. male.    Chief Complaint: Lesion    Patient referred to Interventional Radiology by Dr. Narvaez for evaluation of a prostate lesion. He has a history of history of elevated PSA, negative TUR biopsy in January. History of APR for crohn's disease. He reports feeling well. He denies any difficulty urinating, hematuria, flank pain, or dysuria. A recent MRI obtained on 9/11/2019 for surveillance noted a "Focal lesion in the right base posterior peripheral zone concerning for neoplasia (PIRADS 4 lesion)."    Review of Systems   Constitutional: Negative for activity change, appetite change, chills, fatigue and fever.   HENT: Negative for congestion, drooling, ear discharge, postnasal drip, sneezing and trouble swallowing.    Eyes: Negative for pain, discharge, redness and itching.        Wears glasses   Respiratory: Negative for cough, shortness of breath, wheezing and stridor.    Cardiovascular: Negative for chest pain, palpitations and leg swelling.   Gastrointestinal: Negative for abdominal distention, abdominal pain, constipation, diarrhea, nausea and vomiting.   Genitourinary: Negative for difficulty urinating, dysuria, frequency and urgency.   Musculoskeletal: Positive for arthralgias. Negative for back pain, gait problem, joint swelling, myalgias and neck pain.   Skin: Negative for color change, pallor and rash.   Neurological: Negative for dizziness, weakness and headaches.       Objective:      Physical Exam   Constitutional: He is oriented to person, place, and time. He appears well-developed and well-nourished. No distress.   HENT:   Head: Normocephalic and atraumatic.   Right Ear: External ear normal.   Left Ear: External ear normal.   Nose: Nose normal.   Mouth/Throat: Oropharynx is clear and moist. No oropharyngeal exudate.   Eyes: Pupils are equal, round, and reactive to light. Conjunctivae are normal. Right eye exhibits no discharge. Left eye " exhibits no discharge. No scleral icterus.   Neck: Neck supple. No JVD present. No tracheal deviation present. No thyromegaly present.   Cardiovascular: Normal rate, regular rhythm, normal heart sounds and intact distal pulses. Exam reveals no gallop and no friction rub.   No murmur heard.  Pulmonary/Chest: Effort normal and breath sounds normal. No stridor. No respiratory distress. He has no wheezes. He has no rales.   Abdominal: Soft. Bowel sounds are normal. He exhibits no distension and no mass. There is no hepatosplenomegaly. There is no tenderness. There is no rebound and no guarding.   Musculoskeletal: He exhibits no edema.   Lymphadenopathy:     He has no cervical adenopathy.   Neurological: He is alert and oriented to person, place, and time. Gait normal.   Skin: Skin is warm and dry. He is not diaphoretic. No cyanosis. Nails show no clubbing.   Psychiatric: He has a normal mood and affect.   Vitals reviewed.      Assessment:       1. Elevated PSA    2. S/P TURP    3. Neoplasm        Plan:         Discussed case with Dr. Radford. Explained to patient we can offer biopsy of lesion. Discussed how the procedure will be performed, risks (including, but not limited to, pain, bleeding, infection, damage to nearby structures, and the need for additional procedures), benefits, possible complications, pre-post procedure expectations, and alternatives. Explained will position patient supine. Patient expresses concern regarding his ostomy bag. Reassured we can place a pillow to help minimize compression of bag. Explained risk of vascular injury and development of erectile dysfunction. Discussed possibility of negative biopsy results. Per Dr. Radford approach will be from the left buttcheek in front of the rectum to the right posterolateral aspect of the prostate. The patient voices understanding and all questions have been answered.  The patient agrees to proceed as planned. Offered patient 11/14/2019. He asks for a  Friday. Next available Friday is 11/22/19. Patient scheduled for 11/22/2019. Pre-procedure handout with clinic phone number provided. Patient will have pre-procedure labs drawn today.

## 2019-11-06 NOTE — TELEPHONE ENCOUNTER
----- Message from Reddy Parekh MD sent at 11/6/2019  3:48 PM CST -----  Uric acid level is okay.  Continue the same dose of allopurinol.

## 2019-11-06 NOTE — LETTER
November 6, 2019    Jarrett PRADO Zoltan Fernandez  [address]  [facility] [address]     [facility]  [address]  [facility] [address]  Phone: [phone] PRE-PROCEDURE INSTRUCTIONS    Your procedure with Interventional Radiology is scheduled for November 22, 2019. Please arrive by 9:30am.    You must check-in and receive a wristband before going to your procedure. [facility].    DO NOT take aspirin or plavix for 5 days before your procedure.    **Do not eat or drink anything between midnight and the time of your procedure. This includes gum, mints, and candy lemon drops.    **Do not smoke or drink alcoholic beverages 24 hours prior to your procedure.    **If you wear contact lenses, dentures, hearing aids, or glasses, bring a container to put them in during the procedure and give them to a family member for safekeeping.    **If you have been diagnosed with sleep apnea please bring your CPAP machine.    **If your doctor has scheduled you for an overnight stay, bring a small overnight bag with any personal items that you may need.    **Make arrangements in advance for transportation home by a responsible adult. It is not safe to drive a vehicle during the 24 hours following the procedure.    **All [facility] facilities and properties are tobacco free. Smoking is NOT allowed.    PLEASE NOTE: The procedure schedule has many variables which affect the time of your procedure. Family members should be available if your surgery time changes.    If you have any questions about these instructions call Interventional Radiology at [phone] Monday - Friday between 8:00am and 4:00pm or [phone] for after hours.

## 2019-11-06 NOTE — LETTER
November 6, 2019      Andrew Narvaez MD  [address]  [facility] [address]           [facility]  [address]  [facility] [address]  Phone: [phone]          Patient: Jarrett Charlton Jr.   MR Number: [id number]   YOB: 1950   Date of Visit: 11/6/2019       Dear Dr. Andrew Narvaez:    Thank you for referring Jarrett Charlton to me for evaluation. Attached you will find relevant portions of my assessment and plan of care.    If you have questions, please do not hesitate to call me. I look forward to following Jarrett Charlton along with you.    Sincerely,    Emelia Bower, NP    Enclosure  CC:  No Recipients    If you would like to receive this communication electronically, please contact [email or web address] or [phone] to request more information on [employer] access.    For providers and/or their staff who would like to refer a patient to [facility], please contact us through our one-stop-shop provider referral line, [facility], at [phone].    If you feel you have received this communication in error or would no longer like to receive these types of communications, please e-mail [email or web address]

## 2019-11-06 NOTE — TELEPHONE ENCOUNTER
Spoke with patient about his uric acid level and to continue to take the allopurinol per Dr Parekh

## 2019-11-06 NOTE — Clinical Note
Thank you for referring Mr. Charlton to Interventional Radiology at the [facility]. Please don't hesitate to contact us if there are any questions regarding this evaluation, or if you have any other patients for whom you would like a consultation at [phone].Sincerely,MEGHA Chang, FNPInterventional Radiology

## 2019-11-07 NOTE — PROGRESS NOTES
History of present illness:  69-year-old gentleman with ulcerative colitis.  I continue to follow him for gouty arthritis.  He remains on allopurinol 450 mg daily.  He had several episodes of joint flares which may have been gout attacks.  He takes prednisone for the flare which helps.  He has not had colchicine recently.  His last flare was in his left wrist.  This was several months ago.  He has no pain at the present time.  He did have an episode where he had developed cyanotic looking toes.  It lasted for about 1 week.  It was not painful.  He has had no recurrence of this problem.  He does have an elevated PSA and will need a prostate biopsy.    Physical examination:  Extremities:  Peripheral pulses are 2+ and equal bilaterally.  There are no color changes in the lower extremity.  Musculoskeletal:  He has bony hypertrophy of the PIP is, DIP is, knees, and toes.  He has no synovitis.  He has no tophi.    Assessment:  One.  Inter critical gout  2.  Osteoarthritis    Plans:  It is hard to tell whether his flare ups are actually gout attacks or just due to osteoarthritis.  I recommend that he take prednisone 40 mg, 20 mg, and then 10 mg for 3 successive days if he does have an acute attack.  Laboratory studies are obtained.  Continue allopurinol based on the laboratory studies.  Return to see me in 12 months.

## 2019-11-11 NOTE — PROGRESS NOTES
Pls call pt and let him know potassium and kidney function look okay. Magnesium is still a little low, but he should keep doing his magnesium supplement at the same dose.  I would like a repeat BMP and Mg in 3 weeks.  I'll put the orders in for you to schedule. Thanks.

## 2019-11-11 NOTE — PROGRESS NOTES
Actually, pls call him and notify him of his results, as above. He already has labs scheduled for 12/5, which will work.

## 2019-11-15 ENCOUNTER — TELEPHONE (OUTPATIENT)
Dept: NEPHROLOGY | Facility: CLINIC | Age: 69
End: 2019-11-15

## 2019-11-15 NOTE — TELEPHONE ENCOUNTER
Bianka Arevalo, NP  Ivette Marroquin, MARY KATEN             Pls call pt and let him know potassium and kidney function look okay. Magnesium is still a little low, but he should keep doing his magnesium supplement at the same dose.  I would like a repeat BMP and Mg in 3 weeks.  I'll put the orders in for you to schedule. Thanks.      Informed pt of results per Bianka/pt verbalized understanding

## 2019-11-18 ENCOUNTER — TELEPHONE (OUTPATIENT)
Dept: INTERVENTIONAL RADIOLOGY/VASCULAR | Facility: HOSPITAL | Age: 69
End: 2019-11-18

## 2019-11-18 DIAGNOSIS — D49.9 NEOPLASM: Primary | ICD-10-CM

## 2019-11-22 ENCOUNTER — HOSPITAL ENCOUNTER (OUTPATIENT)
Facility: HOSPITAL | Age: 69
Discharge: HOME | End: 2019-11-22
Attending: RADIOLOGY | Admitting: RADIOLOGY
Payer: MEDICARE

## 2019-11-22 VITALS
HEART RATE: 73 BPM | DIASTOLIC BLOOD PRESSURE: 53 MMHG | RESPIRATION RATE: 14 BRPM | OXYGEN SATURATION: 97 % | BODY MASS INDEX: 25.15 KG/M2 | TEMPERATURE: 98 F | HEIGHT: 74 IN | WEIGHT: 196 LBS | SYSTOLIC BLOOD PRESSURE: 106 MMHG

## 2019-11-22 DIAGNOSIS — D49.9 NEOPLASM: ICD-10-CM

## 2019-11-22 DIAGNOSIS — Z90.79 S/P TURP: ICD-10-CM

## 2019-11-22 DIAGNOSIS — R97.20 ELEVATED PSA: ICD-10-CM

## 2019-11-22 LAB — POCT GLUCOSE: 137 MG/DL (ref 70–110)

## 2019-11-22 PROCEDURE — 88305 TISSUE EXAM BY PATHOLOGIST: ICD-10-PCS | Mod: 26,,, | Performed by: PATHOLOGY

## 2019-11-22 PROCEDURE — 88333 PATH CONSLTJ SURG CYTO XM 1: CPT | Mod: 26,,, | Performed by: PATHOLOGY

## 2019-11-22 PROCEDURE — 88305 TISSUE EXAM BY PATHOLOGIST: CPT | Mod: 26,,, | Performed by: PATHOLOGY

## 2019-11-22 PROCEDURE — 82962 GLUCOSE BLOOD TEST: CPT

## 2019-11-22 PROCEDURE — 88305 TISSUE EXAM BY PATHOLOGIST: CPT | Performed by: PATHOLOGY

## 2019-11-22 PROCEDURE — 88333 PATH CONSLTJ SURG CYTO XM 1: CPT | Performed by: PATHOLOGY

## 2019-11-22 PROCEDURE — 63600175 PHARM REV CODE 636 W HCPCS: Performed by: FAMILY MEDICINE

## 2019-11-22 PROCEDURE — 63600175 PHARM REV CODE 636 W HCPCS: Performed by: RADIOLOGY

## 2019-11-22 PROCEDURE — 88333 PR  INTRAOPERATIVE CYTO PATH CONSULT, INITIAL SITE: ICD-10-PCS | Mod: 26,,, | Performed by: PATHOLOGY

## 2019-11-22 RX ORDER — FENTANYL CITRATE 50 UG/ML
INJECTION, SOLUTION INTRAMUSCULAR; INTRAVENOUS CODE/TRAUMA/SEDATION MEDICATION
Status: COMPLETED | OUTPATIENT
Start: 2019-11-22 | End: 2019-11-22

## 2019-11-22 RX ORDER — FENTANYL CITRATE 50 UG/ML
50 INJECTION, SOLUTION INTRAMUSCULAR; INTRAVENOUS
Status: DISCONTINUED | OUTPATIENT
Start: 2019-11-22 | End: 2019-11-22 | Stop reason: HOSPADM

## 2019-11-22 RX ORDER — SODIUM CHLORIDE 9 MG/ML
500 INJECTION, SOLUTION INTRAVENOUS ONCE
Status: COMPLETED | OUTPATIENT
Start: 2019-11-22 | End: 2019-11-22

## 2019-11-22 RX ORDER — MIDAZOLAM HYDROCHLORIDE 1 MG/ML
INJECTION INTRAMUSCULAR; INTRAVENOUS CODE/TRAUMA/SEDATION MEDICATION
Status: COMPLETED | OUTPATIENT
Start: 2019-11-22 | End: 2019-11-22

## 2019-11-22 RX ORDER — MIDAZOLAM HYDROCHLORIDE 1 MG/ML
1 INJECTION INTRAMUSCULAR; INTRAVENOUS
Status: DISCONTINUED | OUTPATIENT
Start: 2019-11-22 | End: 2019-11-22 | Stop reason: HOSPADM

## 2019-11-22 RX ADMIN — FENTANYL CITRATE 50 MCG: 50 INJECTION, SOLUTION INTRAMUSCULAR; INTRAVENOUS at 02:11

## 2019-11-22 RX ADMIN — MIDAZOLAM HYDROCHLORIDE 2 MG: 1 INJECTION, SOLUTION INTRAMUSCULAR; INTRAVENOUS at 01:11

## 2019-11-22 RX ADMIN — SODIUM CHLORIDE 500 ML: 0.9 INJECTION, SOLUTION INTRAVENOUS at 10:11

## 2019-11-22 RX ADMIN — FENTANYL CITRATE 50 MCG: 50 INJECTION, SOLUTION INTRAMUSCULAR; INTRAVENOUS at 01:11

## 2019-11-22 NOTE — PLAN OF CARE
Pt given discharge instructions and handout.  Daughter at bedside.  Dsg to back changed.  IV d/c'd with cath tip intact.  NAD noted.  Pt to home, steady gait noted.

## 2019-11-22 NOTE — PROCEDURES
Radiology Post Procedure Note:     Procedure: CT guided biopsy    (s): Radha    Blood Loss: Minimal    Specimen: 3 x 18 G core specimens    Findings: Patient came to IR and under CT guidance had a 17 G Needle inserted into the targeted lesion and 3 x 18 g samples obtained.     Please see imaging report for details.     Please note nodule not clearly seen and landmarks used for biopsy.     Leon BYNUM M.D. personally reviewed and agree with the above dictated note. Radiology Post Procedure Note:

## 2019-11-22 NOTE — DISCHARGE SUMMARY
Radiology Discharge Summary      Hospital Course: No complications    Admit Date: 11/22/2019  Discharge Date: 11/22/2019     Instructions Given to Patient: Yes  Diet: Resume prior diet  Activity: activity as tolerated    Description of Condition on Discharge: Stable  Vital Signs (Most Recent): Temp: 97.9 °F (36.6 °C) (11/22/19 1043)  Pulse: 65 (11/22/19 1409)  Resp: 12 (11/22/19 1409)  BP: 131/78 (11/22/19 1409)  SpO2: 99 % (11/22/19 1409)    Discharge Disposition: Home    Discharge Diagnosis: Prostate Lesion     Follow-up: with referring    .Leon BYNUM M.D. personally reviewed and agree with the above dictated note.

## 2019-11-22 NOTE — PLAN OF CARE
Pt arrived to [facility] 2 s/p prostate biopsy.  NAD noted.  Report received from Ana Rosa.  DSG to back, new drainage noted.  Will continue to monitor.

## 2019-11-22 NOTE — SEDATION DOCUMENTATION
Prostate biopsy complete. Pt tolerated well. No acute distress noted. Report to be given in [facility]. Dressing CDI

## 2019-11-22 NOTE — DISCHARGE INSTRUCTIONS
RE MONDAY-FRIDAY 8AM-5PM CALL [phone]  AFTER HOURS CALL [phone] ASK FOR RADIOLOGY RESIDENT

## 2019-11-22 NOTE — PLAN OF CARE
Pt arrived to  via stretcher for prostate biopsy. Name verified using two identifiers. No acute distress noted. Orders, allergies and labs reviewed. Consent and H&P verified

## 2019-11-22 NOTE — H&P
Vascular and Interventional Radiology History & Physical    11/22/2019    SUBJECTIVE:     Chief Complaint:   Abnormal prostate    History of Present Illness:  Jarrett Charlton Jr. is a 69 y.o. male s/p TURP with concerning lesion on prostate MRI showing focal lesion in right base posterior zone (Pirads 4).    Past Medical History:  Past Medical History:   Diagnosis Date    Basal cell carcinoma     BPH (benign prostatic hyperplasia)     s/p TURP    Carotid stenosis     Chronic kidney disease     Claudication     DDD (degenerative disc disease) 10/21/2013    Diabetes mellitus with renal complications     Disc disease, degenerative, cervical     GERD (gastroesophageal reflux disease)     Gout, chronic     History of ulcerative colitis     s/p colectomy and ileostomy    HLD (hyperlipidemia)     HTN (hypertension)     Ileostomy in place 1982    RBBB     Squamous cell carcinoma 03/08/2018    Left superior helix near insertion    Squamous cell carcinoma 04/12/2018    Left forearm x 5    Ventricular tachycardia        Social History:  Past Surgical History:   Procedure Laterality Date    cardiac stents      CATARACT EXTRACTION Bilateral     CERVICAL FUSION      colectomy and ileostomy  1985    TRANSURETHRAL RESECTION OF PROSTATE (TURP) WITHOUT USE OF LASER N/A 1/23/2019    Procedure: TURP, WITHOUT USING LASER BIPOLAR;  Surgeon: Catarino Mota MD;  Location: [facility] OR [address] [place] [address];  Service: Urology;  Laterality: N/A;  1.5 HOURS       Home Meds:   Prior to Admission medications    Medication Sig Start Date End Date Taking? Authorizing Provider   allopurinol (ZYLOPRIM) 300 MG tablet 1 1/2 - 2 a day 3/31/18  Yes Historical Provider, MD   brimonidine 0.2% (ALPHAGAN) 0.2 % Drop INSTILL 1 DROP INTO EACH EYE TWICE DAILY 9/9/19  Yes Historical Provider, MD   ergocalciferol, vitamin D2, (VITAMIN D ORAL) Take by mouth.   Yes Historical Provider, MD   gabapentin (NEURONTIN) 600 MG tablet Take 1 tablet (600 mg total) by  mouth 2 (two) times daily.  Patient taking differently: Take 600 mg by mouth once daily.  7/29/19  Yes Poli Gonzalez MD   imipramine (TOFRANIL) 25 MG tablet Take 1 tablet (25 mg total) by mouth 3 (three) times daily. 8/20/19 11/22/19 Yes Catarino Mota MD   metFORMIN (GLUCOPHAGE) 1000 MG tablet Take 1,000 mg by mouth 2 (two) times daily with meals.   Yes Historical Provider, MD   metoprolol succinate (TOPROL XL) 25 MG 24 hr tablet Take 25 mg by mouth once daily.   Yes Historical Provider, MD   midodrine (PROAMATINE) 2.5 MG Tab Take 2.5 mg by mouth 3 (three) times daily.   Yes Historical Provider, MD   pantoprazole (PROTONIX) 40 MG tablet Take 40 mg by mouth once daily.   Yes Historical Provider, MD   pravastatin (PRAVACHOL) 80 MG tablet Take 80 mg by mouth every evening.   Yes Historical Provider, MD   predniSONE (DELTASONE) 10 MG tablet 2 tabs twice daily for 1 day, then 2 tabs daily for 1 day, then 1 more tablet prn gout attack. 11/6/19  Yes Reddy Parekh MD   aspirin (ECOTRIN) 81 MG EC tablet Take 81 mg by mouth once daily.      Historical Provider, MD   BD ALCOHOL SWABS PadM  4/13/18   Historical Provider, MD   blood sugar diagnostic (ONE TOUCH ULTRA TEST) Strp USE ONE STRIP TO CHECK GLUCOSE EVERY DAY 9/15/14   Elvis Ruiz MD   clopidogrel (PLAVIX) 75 mg tablet  9/6/19   Historical Provider, MD   FLUZONE HIGH-DOSE 2019-20, PF, 180 mcg/0.5 mL Syrg PHARMACIST ADMINISTERED IMMUNIZATION ADMINISTERED AT TIME OF DISPENSING 9/6/19   Historical Provider, MD   magnesium oxide (MAG-OX) 400 mg (241.3 mg magnesium) tablet Take 400 mg by mouth once daily.    Historical Provider, MD       Anticoagulants/Antiplatelets:   aspirin    Allergies:   Review of patient's allergies indicates:  No Known Allergies    Sedation History:    no adverse reactions    Review of Systems:   As documented in primary provider H&P.        OBJECTIVE:     Vital Signs (Most Recent):  Temp: 97.9 °F (36.6 °C) (11/22/19 1043)  Pulse: 81  (11/22/19 1043)  Resp: 16 (11/22/19 1043)  BP: 138/87 (11/22/19 1043)  SpO2: 96 % (11/22/19 1043)    Physical Exam:  No acute distress, laying comfortably in bed, pleasant and cooperative  Regular rate and rhythm  Breathing unlabored  Abdomen benign  Extremities warm and well perfused    ASA: 3  Mallampati: 2    Laboratory  Lab Results   Component Value Date    INR 1.1 11/06/2019       Lab Results   Component Value Date    WBC 7.52 11/06/2019    HGB 13.7 (L) 11/06/2019    HCT 42.9 11/06/2019    MCV 95 11/06/2019     (L) 11/06/2019      Lab Results   Component Value Date     (H) 11/05/2019     11/05/2019    K 4.7 11/05/2019     11/05/2019    CO2 24 11/05/2019    BUN 23 11/05/2019    CREATININE 1.4 11/05/2019    CALCIUM 9.2 11/05/2019    MG 1.4 (L) 11/05/2019    ALT 29 07/19/2019    AST 37 07/19/2019    ALBUMIN 4.0 09/06/2019    BILITOT 0.7 07/19/2019       ASSESSMENT/PLAN:     Sedation Plan: Up to moderate  Patient will undergo: Prostate biopsy    Brijesh Cantu MD  Radiology

## 2019-12-02 LAB
ADEQUACY: NORMAL
FINAL PATHOLOGIC DIAGNOSIS: NORMAL
GROSS: NORMAL

## 2019-12-05 ENCOUNTER — TELEPHONE (OUTPATIENT)
Dept: NEPHROLOGY | Facility: CLINIC | Age: 69
End: 2019-12-05

## 2019-12-05 NOTE — TELEPHONE ENCOUNTER
Bianka Arevalo, NICKY Marroquin, MARY KATEN             Pls call pt and let him know kidney function and potassium levels are stable. Magnesium is better, but marginally low still.  He should continue his current magnesium supplement.  Pls remind him to adhere to low potassium diet and continue to hydrate well.  Thanks.      Spoke to pt informed him of results per Bianka /pt verbalized understanding

## 2019-12-28 PROBLEM — R55 SYNCOPE: Status: ACTIVE | Noted: 2019-12-28

## 2019-12-28 PROBLEM — N17.9 ACUTE RENAL FAILURE: Status: ACTIVE | Noted: 2019-12-28

## 2019-12-28 PROBLEM — E11.29 DM (DIABETES MELLITUS) TYPE II CONTROLLED WITH RENAL MANIFESTATION: Status: ACTIVE | Noted: 2019-12-28

## 2019-12-28 PROBLEM — N18.30 CKD (CHRONIC KIDNEY DISEASE), STAGE III: Status: ACTIVE | Noted: 2019-12-28

## 2019-12-28 PROBLEM — N18.30 ACUTE RENAL FAILURE SUPERIMPOSED ON STAGE 3 CHRONIC KIDNEY DISEASE: Status: ACTIVE | Noted: 2019-12-28

## 2019-12-30 PROBLEM — E87.20 METABOLIC ACIDOSIS WITH NORMAL ANION GAP AND BICARBONATE LOSSES: Status: ACTIVE | Noted: 2019-12-30

## 2020-01-03 RX ORDER — ALLOPURINOL 300 MG/1
450 TABLET ORAL DAILY
Qty: 135 TABLET | Refills: 3 | Status: SHIPPED | OUTPATIENT
Start: 2020-01-03 | End: 2020-01-07 | Stop reason: SDUPTHER

## 2020-01-07 RX ORDER — ALLOPURINOL 300 MG/1
450 TABLET ORAL DAILY
Qty: 135 TABLET | Refills: 3 | Status: SHIPPED | OUTPATIENT
Start: 2020-01-07 | End: 2020-07-23 | Stop reason: SDUPTHER

## 2020-01-07 NOTE — TELEPHONE ENCOUNTER
----- Message from Peri Arguello sent at 1/7/2020  9:14 AM CST -----  Contact: Self [phone]  Pt states his medication was sent to the wrong place for his allopurinol (ZYLOPRIM) 300 MG tablet      states please send to [facility] mail order  Please call back to discuss

## 2020-01-08 ENCOUNTER — OFFICE VISIT (OUTPATIENT)
Dept: PRIMARY CARE CLINIC | Facility: CLINIC | Age: 70
End: 2020-01-08
Payer: MEDICARE

## 2020-01-08 VITALS
TEMPERATURE: 98 F | BODY MASS INDEX: 26.82 KG/M2 | SYSTOLIC BLOOD PRESSURE: 124 MMHG | WEIGHT: 209 LBS | HEIGHT: 74 IN | DIASTOLIC BLOOD PRESSURE: 76 MMHG | HEART RATE: 67 BPM | OXYGEN SATURATION: 98 % | RESPIRATION RATE: 18 BRPM

## 2020-01-08 DIAGNOSIS — N18.3 ACUTE RENAL FAILURE SUPERIMPOSED ON STAGE 3 CHRONIC KIDNEY DISEASE, UNSPECIFIED ACUTE RENAL FAILURE TYPE: Primary | ICD-10-CM

## 2020-01-08 DIAGNOSIS — N17.9 ACUTE RENAL FAILURE SUPERIMPOSED ON STAGE 3 CHRONIC KIDNEY DISEASE, UNSPECIFIED ACUTE RENAL FAILURE TYPE: Primary | ICD-10-CM

## 2020-01-08 DIAGNOSIS — E87.20 METABOLIC ACIDOSIS WITH NORMAL ANION GAP AND BICARBONATE LOSSES: ICD-10-CM

## 2020-01-08 DIAGNOSIS — I10 ESSENTIAL HYPERTENSION: ICD-10-CM

## 2020-01-08 DIAGNOSIS — E83.42 HYPOMAGNESEMIA: ICD-10-CM

## 2020-01-08 DIAGNOSIS — E11.9 TYPE 2 DIABETES MELLITUS WITHOUT COMPLICATION, WITHOUT LONG-TERM CURRENT USE OF INSULIN: ICD-10-CM

## 2020-01-08 DIAGNOSIS — Z12.5 SCREENING FOR PROSTATE CANCER: ICD-10-CM

## 2020-01-08 PROCEDURE — 1126F AMNT PAIN NOTED NONE PRSNT: CPT | Mod: S$GLB,,, | Performed by: INTERNAL MEDICINE

## 2020-01-08 PROCEDURE — 1101F PR PT FALLS ASSESS DOC 0-1 FALLS W/OUT INJ PAST YR: ICD-10-PCS | Mod: CPTII,S$GLB,, | Performed by: INTERNAL MEDICINE

## 2020-01-08 PROCEDURE — 1159F PR MEDICATION LIST DOCUMENTED IN MEDICAL RECORD: ICD-10-PCS | Mod: S$GLB,,, | Performed by: INTERNAL MEDICINE

## 2020-01-08 PROCEDURE — 99213 OFFICE O/P EST LOW 20 MIN: CPT | Mod: S$GLB,,, | Performed by: INTERNAL MEDICINE

## 2020-01-08 PROCEDURE — 99213 PR OFFICE/OUTPT VISIT, EST, LEVL III, 20-29 MIN: ICD-10-PCS | Mod: S$GLB,,, | Performed by: INTERNAL MEDICINE

## 2020-01-08 PROCEDURE — 1159F MED LIST DOCD IN RCRD: CPT | Mod: S$GLB,,, | Performed by: INTERNAL MEDICINE

## 2020-01-08 PROCEDURE — 3044F HG A1C LEVEL LT 7.0%: CPT | Mod: CPTII,S$GLB,, | Performed by: INTERNAL MEDICINE

## 2020-01-08 PROCEDURE — 3074F PR MOST RECENT SYSTOLIC BLOOD PRESSURE < 130 MM HG: ICD-10-PCS | Mod: CPTII,S$GLB,, | Performed by: INTERNAL MEDICINE

## 2020-01-08 PROCEDURE — 1101F PT FALLS ASSESS-DOCD LE1/YR: CPT | Mod: CPTII,S$GLB,, | Performed by: INTERNAL MEDICINE

## 2020-01-08 PROCEDURE — 99999 PR PBB SHADOW E&M-EST. PATIENT-LVL III: CPT | Mod: PBBFAC,,, | Performed by: INTERNAL MEDICINE

## 2020-01-08 PROCEDURE — 1126F PR PAIN SEVERITY QUANTIFIED, NO PAIN PRESENT: ICD-10-PCS | Mod: S$GLB,,, | Performed by: INTERNAL MEDICINE

## 2020-01-08 PROCEDURE — 3078F DIAST BP <80 MM HG: CPT | Mod: CPTII,S$GLB,, | Performed by: INTERNAL MEDICINE

## 2020-01-08 PROCEDURE — 99999 PR PBB SHADOW E&M-EST. PATIENT-LVL III: ICD-10-PCS | Mod: PBBFAC,,, | Performed by: INTERNAL MEDICINE

## 2020-01-08 PROCEDURE — 3044F PR MOST RECENT HEMOGLOBIN A1C LEVEL <7.0%: ICD-10-PCS | Mod: CPTII,S$GLB,, | Performed by: INTERNAL MEDICINE

## 2020-01-08 PROCEDURE — 3074F SYST BP LT 130 MM HG: CPT | Mod: CPTII,S$GLB,, | Performed by: INTERNAL MEDICINE

## 2020-01-08 PROCEDURE — 3078F PR MOST RECENT DIASTOLIC BLOOD PRESSURE < 80 MM HG: ICD-10-PCS | Mod: CPTII,S$GLB,, | Performed by: INTERNAL MEDICINE

## 2020-01-08 NOTE — PROGRESS NOTES
Subjective:       Patient ID: Jarrett Charlton Jr. is a 69 y.o. male.    Chief Complaint: Follow-up    HPI  Pt is here for f/u from hospital recurrent admit for ARF from high output ileostomy and metabolic acidoses pt currently doing well since place on NaBicarb TID no c/o today no sob cp abd pain colostomy OP normal he is on KCL and Mag supplement  Pt is off metformin due to diarrhea on Trajenta will check labs  Review of Systems    Objective:      Physical Exam   Constitutional: He is oriented to person, place, and time. He appears well-developed and well-nourished. No distress.   HENT:   Head: Normocephalic and atraumatic.   Right Ear: External ear normal.   Left Ear: External ear normal.   Nose: Nose normal.   Mouth/Throat: Oropharynx is clear and moist. No oropharyngeal exudate.   Eyes: Pupils are equal, round, and reactive to light. Conjunctivae and EOM are normal. Right eye exhibits no discharge. Left eye exhibits no discharge.   Neck: Normal range of motion. Neck supple. No thyromegaly present.   Cardiovascular: Normal rate, regular rhythm, normal heart sounds and intact distal pulses. Exam reveals no gallop and no friction rub.   No murmur heard.  Pulmonary/Chest: Effort normal and breath sounds normal. No respiratory distress. He has no wheezes. He has no rales. He exhibits no tenderness.   Abdominal: Soft. Bowel sounds are normal. He exhibits no distension. There is no tenderness. There is no rebound and no guarding.   ileostomy normal   Musculoskeletal: Normal range of motion. He exhibits no edema, tenderness or deformity.   Lymphadenopathy:     He has no cervical adenopathy.   Neurological: He is alert and oriented to person, place, and time.   Skin: Skin is warm and dry. Capillary refill takes less than 2 seconds. No rash noted. No erythema.   Psychiatric: He has a normal mood and affect. Judgment and thought content normal.   Nursing note and vitals reviewed.      Assessment:       1. Acute renal  failure superimposed on stage 3 chronic kidney disease, unspecified acute renal failure type    2. Metabolic acidosis with normal anion gap and bicarbonate losses    3. Hypomagnesemia    4. Essential hypertension    5. Type 2 diabetes mellitus without complication, without long-term current use of insulin    6. Screening for prostate cancer        Plan:       Acute renal failure superimposed on stage 3 chronic kidney disease, unspecified acute renal failure type  Comments:  stable on current meds    Metabolic acidosis with normal anion gap and bicarbonate losses  -     Comprehensive metabolic panel; Future; Expected date: 01/08/2020    Hypomagnesemia  -     Magnesium; Future; Expected date: 01/08/2020    Essential hypertension  -     CBC auto differential; Future; Expected date: 01/08/2020  -     Comprehensive metabolic panel; Future; Expected date: 01/08/2020    Type 2 diabetes mellitus without complication, without long-term current use of insulin  -     Lipid panel; Future; Expected date: 01/08/2020  -     Hemoglobin A1c; Future; Expected date: 01/08/2020  -     Microalbumin/creatinine urine ratio; Future; Expected date: 01/08/2020    Screening for prostate cancer  -     PSA, Screening; Future; Expected date: 01/08/2020

## 2020-01-10 RX ORDER — GLIMEPIRIDE 4 MG/1
4 TABLET ORAL
Qty: 90 TABLET | Refills: 3 | Status: SHIPPED | OUTPATIENT
Start: 2020-01-10 | End: 2020-02-06 | Stop reason: SDUPTHER

## 2020-01-10 NOTE — TELEPHONE ENCOUNTER
----- Message from Jose Juan Zazueta sent at 1/10/2020  8:04 AM CST -----  Contact: Self   Patient state the Rx linaGLIPtin (TRADJENTA) 5 mg Tab tablet is not working. Patient state the reading for his diabetes is at 220 for two days and it is not going down. Please call and advise.

## 2020-01-10 NOTE — TELEPHONE ENCOUNTER
PLEASE SIGN PENDED RX      Spoke to pt. And per  he will send glimepiride 4 mg to pharm. Continue tradjenta with glimepiride until sugar is below 150. Then d/c tadjenta . Patient is to continue taking glimepiride 4 mg qd .

## 2020-02-06 ENCOUNTER — OFFICE VISIT (OUTPATIENT)
Dept: PRIMARY CARE CLINIC | Facility: CLINIC | Age: 70
End: 2020-02-06
Payer: MEDICARE

## 2020-02-06 VITALS
RESPIRATION RATE: 18 BRPM | WEIGHT: 210 LBS | SYSTOLIC BLOOD PRESSURE: 178 MMHG | DIASTOLIC BLOOD PRESSURE: 98 MMHG | HEART RATE: 82 BPM | OXYGEN SATURATION: 96 % | HEIGHT: 74 IN | BODY MASS INDEX: 26.95 KG/M2 | TEMPERATURE: 98 F

## 2020-02-06 DIAGNOSIS — E11.9 TYPE 2 DIABETES MELLITUS WITHOUT COMPLICATION, WITHOUT LONG-TERM CURRENT USE OF INSULIN: Primary | ICD-10-CM

## 2020-02-06 DIAGNOSIS — E87.20 METABOLIC ACIDOSIS WITH NORMAL ANION GAP AND BICARBONATE LOSSES: ICD-10-CM

## 2020-02-06 DIAGNOSIS — J06.9 URI WITH COUGH AND CONGESTION: ICD-10-CM

## 2020-02-06 PROCEDURE — 3077F SYST BP >= 140 MM HG: CPT | Mod: CPTII,S$GLB,, | Performed by: INTERNAL MEDICINE

## 2020-02-06 PROCEDURE — 3080F PR MOST RECENT DIASTOLIC BLOOD PRESSURE >= 90 MM HG: ICD-10-PCS | Mod: CPTII,S$GLB,, | Performed by: INTERNAL MEDICINE

## 2020-02-06 PROCEDURE — 1100F PTFALLS ASSESS-DOCD GE2>/YR: CPT | Mod: CPTII,S$GLB,, | Performed by: INTERNAL MEDICINE

## 2020-02-06 PROCEDURE — 1126F AMNT PAIN NOTED NONE PRSNT: CPT | Mod: S$GLB,,, | Performed by: INTERNAL MEDICINE

## 2020-02-06 PROCEDURE — 3288F FALL RISK ASSESSMENT DOCD: CPT | Mod: CPTII,S$GLB,, | Performed by: INTERNAL MEDICINE

## 2020-02-06 PROCEDURE — 3288F PR FALLS RISK ASSESSMENT DOCUMENTED: ICD-10-PCS | Mod: CPTII,S$GLB,, | Performed by: INTERNAL MEDICINE

## 2020-02-06 PROCEDURE — 1159F MED LIST DOCD IN RCRD: CPT | Mod: S$GLB,,, | Performed by: INTERNAL MEDICINE

## 2020-02-06 PROCEDURE — 99999 PR PBB SHADOW E&M-EST. PATIENT-LVL III: CPT | Mod: PBBFAC,,, | Performed by: INTERNAL MEDICINE

## 2020-02-06 PROCEDURE — 3080F DIAST BP >= 90 MM HG: CPT | Mod: CPTII,S$GLB,, | Performed by: INTERNAL MEDICINE

## 2020-02-06 PROCEDURE — 99999 PR PBB SHADOW E&M-EST. PATIENT-LVL III: ICD-10-PCS | Mod: PBBFAC,,, | Performed by: INTERNAL MEDICINE

## 2020-02-06 PROCEDURE — 1126F PR PAIN SEVERITY QUANTIFIED, NO PAIN PRESENT: ICD-10-PCS | Mod: S$GLB,,, | Performed by: INTERNAL MEDICINE

## 2020-02-06 PROCEDURE — 99213 PR OFFICE/OUTPT VISIT, EST, LEVL III, 20-29 MIN: ICD-10-PCS | Mod: S$GLB,,, | Performed by: INTERNAL MEDICINE

## 2020-02-06 PROCEDURE — 99213 OFFICE O/P EST LOW 20 MIN: CPT | Mod: S$GLB,,, | Performed by: INTERNAL MEDICINE

## 2020-02-06 PROCEDURE — 1100F PR PT FALLS ASSESS DOC 2+ FALLS/FALL W/INJURY/YR: ICD-10-PCS | Mod: CPTII,S$GLB,, | Performed by: INTERNAL MEDICINE

## 2020-02-06 PROCEDURE — 3044F PR MOST RECENT HEMOGLOBIN A1C LEVEL <7.0%: ICD-10-PCS | Mod: CPTII,S$GLB,, | Performed by: INTERNAL MEDICINE

## 2020-02-06 PROCEDURE — 1159F PR MEDICATION LIST DOCUMENTED IN MEDICAL RECORD: ICD-10-PCS | Mod: S$GLB,,, | Performed by: INTERNAL MEDICINE

## 2020-02-06 PROCEDURE — 3044F HG A1C LEVEL LT 7.0%: CPT | Mod: CPTII,S$GLB,, | Performed by: INTERNAL MEDICINE

## 2020-02-06 PROCEDURE — 3077F PR MOST RECENT SYSTOLIC BLOOD PRESSURE >= 140 MM HG: ICD-10-PCS | Mod: CPTII,S$GLB,, | Performed by: INTERNAL MEDICINE

## 2020-02-06 RX ORDER — LANCETS
EACH MISCELLANEOUS
COMMUNITY
Start: 2019-12-15 | End: 2020-02-06

## 2020-02-06 RX ORDER — MINOCYCLINE HYDROCHLORIDE 100 MG/1
100 CAPSULE ORAL 2 TIMES DAILY
Qty: 20 CAPSULE | Refills: 0 | Status: SHIPPED | OUTPATIENT
Start: 2020-02-06 | End: 2020-02-16

## 2020-02-06 RX ORDER — BLOOD GLUCOSE CONTROL HIGH,LOW
EACH MISCELLANEOUS
COMMUNITY
Start: 2019-12-16 | End: 2020-02-06

## 2020-02-06 RX ORDER — LISINOPRIL 10 MG/1
TABLET ORAL
COMMUNITY
Start: 2020-01-27 | End: 2020-08-11

## 2020-02-06 RX ORDER — LEVOCETIRIZINE DIHYDROCHLORIDE 5 MG/1
5 TABLET, FILM COATED ORAL NIGHTLY
Qty: 30 TABLET | Refills: 1 | Status: SHIPPED | OUTPATIENT
Start: 2020-02-06 | End: 2020-05-21

## 2020-02-06 RX ORDER — BENZONATATE 200 MG/1
200 CAPSULE ORAL 3 TIMES DAILY PRN
Qty: 30 CAPSULE | Refills: 0 | Status: SHIPPED | OUTPATIENT
Start: 2020-02-06 | End: 2020-02-16

## 2020-02-06 RX ORDER — GLIMEPIRIDE 4 MG/1
4 TABLET ORAL 2 TIMES DAILY WITH MEALS
Qty: 180 TABLET | Refills: 3 | Status: SHIPPED | OUTPATIENT
Start: 2020-02-06 | End: 2020-09-30

## 2020-02-06 NOTE — PROGRESS NOTES
Subjective:       Patient ID: Jarrett Charlton Jr. is a 69 y.o. male.    Chief Complaint: Nasal Congestion; Diabetes; and Hypertension    HPI  Pt c/o cold symptoms several days with nasal congestion coughing sorethroat no sob cp no n/v/d loose stool in colostomy better since on NaHCO3 stool now more solid but BP going up pt rangel sappt with Dr Dao in 20 minutes will let him adjust his BP meds and his DM better when he take amaryl 4 mg BID if not am glucose is OK but high in pm >200mg/dl  Review of Systems    Objective:      Physical Exam   Constitutional: He is oriented to person, place, and time. He appears well-developed and well-nourished. No distress.   HENT:   Head: Normocephalic and atraumatic.   Right Ear: External ear normal.   Left Ear: External ear normal.   Mouth/Throat: Oropharynx is clear and moist. No oropharyngeal exudate.   Nasal congestion bilaterally   Eyes: Pupils are equal, round, and reactive to light. Conjunctivae and EOM are normal. Right eye exhibits no discharge. Left eye exhibits no discharge.   Neck: Normal range of motion. Neck supple. No thyromegaly present.   Cardiovascular: Normal rate, regular rhythm, normal heart sounds and intact distal pulses. Exam reveals no gallop and no friction rub.   No murmur heard.  Pulmonary/Chest: Effort normal and breath sounds normal. No respiratory distress. He has no wheezes. He has no rales. He exhibits no tenderness.   Abdominal: Soft. Bowel sounds are normal. He exhibits no distension. There is no tenderness. There is no rebound and no guarding.   Colostomy currently empty   Musculoskeletal: Normal range of motion. He exhibits no edema, tenderness or deformity.   Lymphadenopathy:     He has no cervical adenopathy.   Neurological: He is alert and oriented to person, place, and time.   Skin: Skin is warm and dry. Capillary refill takes less than 2 seconds. No rash noted. No erythema.   Psychiatric: He has a normal mood and affect. Judgment and thought  content normal.   Nursing note and vitals reviewed.      Assessment:       1. Type 2 diabetes mellitus without complication, without long-term current use of insulin    2. URI with cough and congestion    3. Metabolic acidosis with normal anion gap and bicarbonate losses        Plan:       Type 2 diabetes mellitus without complication, without long-term current use of insulin  Comments:  increase amaryl to 4 mg po BID and monitor  Orders:  -     glimepiride (AMARYL) 4 MG tablet; Take 1 tablet (4 mg total) by mouth 2 (two) times daily with meals.  Dispense: 180 tablet; Refill: 3    URI with cough and congestion  -     minocycline (MINOCIN,DYNACIN) 100 MG capsule; Take 1 capsule (100 mg total) by mouth 2 (two) times daily. for 10 days  Dispense: 20 capsule; Refill: 0  -     benzonatate (TESSALON) 200 MG capsule; Take 1 capsule (200 mg total) by mouth 3 (three) times daily as needed for Cough.  Dispense: 30 capsule; Refill: 0  -     levocetirizine (XYZAL) 5 MG tablet; Take 1 tablet (5 mg total) by mouth every evening. For nasal drip congestion  Dispense: 30 tablet; Refill: 1    Metabolic acidosis with normal anion gap and bicarbonate losses  Comments:  improving since on NaHCO3

## 2020-02-07 ENCOUNTER — TELEPHONE (OUTPATIENT)
Dept: NEPHROLOGY | Facility: CLINIC | Age: 70
End: 2020-02-07

## 2020-02-07 NOTE — TELEPHONE ENCOUNTER
----- Message from Laisha Zazueta sent at 2/6/2020  9:49 AM CST -----  Contact:    Pt  [phone]  Pt  Called stating that  A medication was changed by one os his Dr's,  Pt wants to make sure that it will not effect his kidney nor interfere with his other meds..  Call back to discuss more

## 2020-02-07 NOTE — TELEPHONE ENCOUNTER
----- Message from Laisha Zazueta sent at 2/6/2020  9:49 AM CST -----  Contact:    Pt  [phone]  Pt  Called stating that  A medication was changed by one os his Dr's,  Pt wants to make sure that it will not effect his kidney nor interfere with his other meds..  Call back to discuss more

## 2020-02-10 ENCOUNTER — TELEPHONE (OUTPATIENT)
Dept: NEPHROLOGY | Facility: CLINIC | Age: 70
End: 2020-02-10

## 2020-02-10 DIAGNOSIS — N18.30 CHRONIC KIDNEY DISEASE, STAGE III (MODERATE): Primary | ICD-10-CM

## 2020-02-10 NOTE — TELEPHONE ENCOUNTER
We had stopped his lisinopril b/c of his low bp's.  They may have restarted it.  If he is tolerating the bp's, that's fine.  I am not sure which doctor started it as I looked at the chart and couldn't find who started it.  It's been linked to possible lung cancer and if he wants to be switched to another medicine similar to it called losartan, please ask him to discuss it with his doctor.

## 2020-02-11 ENCOUNTER — TELEPHONE (OUTPATIENT)
Dept: NEPHROLOGY | Facility: CLINIC | Age: 70
End: 2020-02-11

## 2020-02-11 NOTE — TELEPHONE ENCOUNTER
----- Message from Ivette Marroquin LPN sent at 2/11/2020  1:01 PM CST -----      ----- Message -----  From: Bianka Arevalo NP  Sent: 2/10/2020   4:11 PM CST  To: Ivette Marroquin LPN    Hey, does this pt have a f/u appt yet? You just put labs in for him; I know his cardiologist wants him seen by us ASAP.  He actually just had electrolytes today and a recent CBC, so he doesn't need another BMP or CBC right now as long as he gets in to me in the next week or so. Just urine. THanks.

## 2020-02-13 DIAGNOSIS — E83.42 HYPOMAGNESEMIA: Primary | ICD-10-CM

## 2020-02-13 DIAGNOSIS — N18.30 CKD (CHRONIC KIDNEY DISEASE) STAGE 3, GFR 30-59 ML/MIN: Primary | ICD-10-CM

## 2020-02-13 NOTE — PROGRESS NOTES
Subjective:       Patient ID: Jarrett Charlton Jr. is a 69 y.o. White male who presents for follow-up evaluation of   CKD, hyperkalemia    HPI    Last seen by me in 2019. Previously seen by Dr. Lyon in Aug 2019.  Prior pertinent chart reviewed since this is patient's first appointment with me.    Patient presents for f/u of CKD.  Baseline creatinine of 1.3-1.5 c frequent increases to 1.6-1.8 range an occasional AKIs c higher sCr. Had GRADY in late December c peak sCr of 2.3 mg/dL 2/2 volume depletion from high output in ileostomy. Has had hypomagnesemia in the past.  Had K of 5.9 in November; provided with K finder.    Says he has had frequent syncope. Recently admitted () and started on sodium bicarb 650 mg 4x/day (pt was actually taking BID) but was told by his cardiologist to stop until it was adjusted further b/c dose was too high. Has stopped, but says while he was on it, he was not having liquid stools in ileostomy and was producing more urine.  Now that he is off, he said the liquid stools have returned and he has less urine output.    Home BPs: Evenins/90s - 165/110.  Morning/day: 110s/80s. Says he drinks 96 oz water daily.    Significant hx includes Hypertension for 5 years, DM for 3 yrs, gout, ulcerative colitis with ostomy and hyperlipidemia.     The patient denies taking NSAIDs, herbal supplements, recreational drugs, recent episode of nausea or vomiting.  Patient says he had recent IV contrast at the end of . Recently on antibiotics, with frequent dehydration c diarrhea through ostomy.    Social history: retired, wife wheelchair bound  Significant family hx includes: no known kidney disease in family.    Last renal US: 2019, reviewed. Simple left renal cyst.    Review of Systems   Constitutional: Positive for fatigue. Negative for appetite change.   HENT: Positive for postnasal drip.    Respiratory: Negative for cough, shortness of breath, wheezing and stridor.   "  Cardiovascular: Negative for chest pain, palpitations and leg swelling.   Gastrointestinal: Positive for diarrhea (to ostomy). Negative for constipation, nausea and vomiting.   Genitourinary: Negative for difficulty urinating, dysuria, flank pain, frequency, hematuria and urgency.        Pt says decreased urine output c high ostomy output   Neurological: Positive for dizziness and syncope (multiple episodes in last year). Negative for weakness and light-headedness.       Objective:       Blood pressure (!) 140/100, pulse 84, height 6' 2" (1.88 m), weight 96.2 kg (212 lb), SpO2 97 %.  Physical Exam   Constitutional: He is oriented to person, place, and time. He appears well-developed and well-nourished. No distress.   Neck: Neck supple.   Cardiovascular: Normal rate, regular rhythm and normal heart sounds. Exam reveals no gallop and no friction rub.   No murmur heard.  Pulmonary/Chest: Effort normal and breath sounds normal. No stridor. No respiratory distress. He has no wheezes. He has no rales.   Abdominal: Soft. He exhibits no distension. There is no tenderness.   No cva tenderness; ostomy bag present to right; hyperactive bowel sounds   Musculoskeletal: He exhibits no edema.   Neurological: He is alert and oriented to person, place, and time.   Skin: Skin is warm and dry. He is not diaphoretic. No pallor.   Bronzed skin to calves bilaterally   Psychiatric: He has a normal mood and affect. His behavior is normal.   Vitals reviewed.        SCr: 1.6 mg/dL  Hgb: 13.2 g/dL  CO2: 25 mmol/L  K: 4.0 mmol/L  A1c: 7.0%  Ma.6 mg/dL  UPCR: negative  Outside labs 10/24/19  Crt: 1.5 mg/dL  Hgb: 15.2 g/dL  CO2: 20 mmol/L  K: 5.9 mmol/L  A1c: 7.1%    Assessment:       1. CKD (chronic kidney disease) stage 3, GFR 30-59 ml/min    2. Essential hypertension    3. Hyperkalemia    4. Hypomagnesemia    5. Altered bowel elimination due to intestinal ostomy        Plan:     CKD stage 3B c eGFR 43mL/min - 2/2 age-related nephron " loss, HTN, previous NSAID use for gout, dehydration episodes c high ostomy output.  No longer using NSAIDs. Recent GRADY, but improving toward baseline.  Encouraged hydration, especially at times of higher ostomy output.  Discussed controlling BP, staying hydrated, and avoiding NSAIDs are important to prevent progression.  At risk for progression given frequent AKIs.   UPCR No significant proteinuria. On lisinopril 5mg.  If pt does not need this medication from a cardiology standpoint, would be okay to d/c from renal standpoint. D/c of lisinopril may decrease risk of future AKIs   Acid-base Serum bicarb now WNL; pt had been off sodium bicarb for about 1.5 weeks prior to this lab being drawn. Does not need more sodium bicarb at this time.   Renal osteodystrophy PTH WNL. Ca WNL. On ergocalciferol.   Anemia At goal for CKD   DM At goal for CKD. On glimepiride.   Lipid Management On statin per PCP     HTN - Home BPs labile on metoprolol succinate 25mg, lisinopril 5 mg. Pt on another BP medication but not sure of name; recently increased yesterday by cardiology team (Dr. Dao - [facility]). Will contact Dr. Dao to discuss d/c of lisinopril.    Hyperkalemia - Has been WNL. Previously given K finder.    Hypomagnesemia - improved to WNL.    High ostomy output - Will have pt follow up c Dr. Haley, and will send message to Dr. Haley.    All questions patient had were answered.  Asked if further questions. None. F/u in early May 2020.  with labs and urine prior to next visit or sooner if needed.  ER for emergency concerns.    Summary of Plan:  1. Do not restart sodium bicarb at this time  2. Follow up with Dr. Haley regarding high ostomy output  3. Okay to d/c lisinopril from renal perspective if okay from cardiology perspective  4. Stay hydrated  5. Avoid NSAIDs, IV contrast, Bactrim  6. RTC in early May 2020

## 2020-02-19 ENCOUNTER — OFFICE VISIT (OUTPATIENT)
Dept: NEPHROLOGY | Facility: CLINIC | Age: 70
End: 2020-02-19
Payer: MEDICARE

## 2020-02-19 VITALS
DIASTOLIC BLOOD PRESSURE: 100 MMHG | HEART RATE: 84 BPM | HEIGHT: 74 IN | BODY MASS INDEX: 27.21 KG/M2 | OXYGEN SATURATION: 97 % | SYSTOLIC BLOOD PRESSURE: 140 MMHG | WEIGHT: 212 LBS

## 2020-02-19 DIAGNOSIS — N18.30 CKD (CHRONIC KIDNEY DISEASE) STAGE 3, GFR 30-59 ML/MIN: Primary | ICD-10-CM

## 2020-02-19 DIAGNOSIS — E83.42 HYPOMAGNESEMIA: ICD-10-CM

## 2020-02-19 DIAGNOSIS — K94.19 ALTERED BOWEL ELIMINATION DUE TO INTESTINAL OSTOMY: ICD-10-CM

## 2020-02-19 DIAGNOSIS — I10 ESSENTIAL HYPERTENSION: ICD-10-CM

## 2020-02-19 DIAGNOSIS — E87.5 HYPERKALEMIA: ICD-10-CM

## 2020-02-19 PROCEDURE — 3077F SYST BP >= 140 MM HG: CPT | Mod: CPTII,S$GLB,, | Performed by: NURSE PRACTITIONER

## 2020-02-19 PROCEDURE — 3080F PR MOST RECENT DIASTOLIC BLOOD PRESSURE >= 90 MM HG: ICD-10-PCS | Mod: CPTII,S$GLB,, | Performed by: NURSE PRACTITIONER

## 2020-02-19 PROCEDURE — 3080F DIAST BP >= 90 MM HG: CPT | Mod: CPTII,S$GLB,, | Performed by: NURSE PRACTITIONER

## 2020-02-19 PROCEDURE — 1126F AMNT PAIN NOTED NONE PRSNT: CPT | Mod: S$GLB,,, | Performed by: NURSE PRACTITIONER

## 2020-02-19 PROCEDURE — 99214 OFFICE O/P EST MOD 30 MIN: CPT | Mod: S$GLB,,, | Performed by: NURSE PRACTITIONER

## 2020-02-19 PROCEDURE — 1101F PT FALLS ASSESS-DOCD LE1/YR: CPT | Mod: CPTII,S$GLB,, | Performed by: NURSE PRACTITIONER

## 2020-02-19 PROCEDURE — 99999 PR PBB SHADOW E&M-EST. PATIENT-LVL III: CPT | Mod: PBBFAC,,, | Performed by: NURSE PRACTITIONER

## 2020-02-19 PROCEDURE — 99999 PR PBB SHADOW E&M-EST. PATIENT-LVL III: ICD-10-PCS | Mod: PBBFAC,,, | Performed by: NURSE PRACTITIONER

## 2020-02-19 PROCEDURE — 1159F PR MEDICATION LIST DOCUMENTED IN MEDICAL RECORD: ICD-10-PCS | Mod: S$GLB,,, | Performed by: NURSE PRACTITIONER

## 2020-02-19 PROCEDURE — 1101F PR PT FALLS ASSESS DOC 0-1 FALLS W/OUT INJ PAST YR: ICD-10-PCS | Mod: CPTII,S$GLB,, | Performed by: NURSE PRACTITIONER

## 2020-02-19 PROCEDURE — 1126F PR PAIN SEVERITY QUANTIFIED, NO PAIN PRESENT: ICD-10-PCS | Mod: S$GLB,,, | Performed by: NURSE PRACTITIONER

## 2020-02-19 PROCEDURE — 3077F PR MOST RECENT SYSTOLIC BLOOD PRESSURE >= 140 MM HG: ICD-10-PCS | Mod: CPTII,S$GLB,, | Performed by: NURSE PRACTITIONER

## 2020-02-19 PROCEDURE — 1159F MED LIST DOCD IN RCRD: CPT | Mod: S$GLB,,, | Performed by: NURSE PRACTITIONER

## 2020-02-19 PROCEDURE — 99214 PR OFFICE/OUTPT VISIT, EST, LEVL IV, 30-39 MIN: ICD-10-PCS | Mod: S$GLB,,, | Performed by: NURSE PRACTITIONER

## 2020-02-19 NOTE — Clinical Note
Alicia Dao,I just saw this pt in nephrology, and I understand you have been adjusting his BP meds. From a renal standpoint, it would be okay to d/c lisinopril. With pt's frequent volume depletion issues, it may be putting him at high risk for AKIs. I wanted to make sure you did not need him on it from a cardiology standpoint though.Pt also stated that you started him on a new medication, but he could not remember the name of it.  Can you tell me what it is?Thanks,Bianka Arevalo, NP-C

## 2020-02-19 NOTE — Clinical Note
Alicia Haley,I understand you follow this pt for is ileostomy.  He has had frequent AKIs 2/2 volume depletion from high ostomy output. He says he drinks about 96 oz of water daily, but he has had multiple hospitalizations for volume depletion because he cannot keep up with replenishing the fluid lost through his ostomy.  Do you have any suggestions/ can I defer to you to follow up with him on this issue?Thanks,Bianka Arevalo, NICKY-C

## 2020-02-19 NOTE — PATIENT INSTRUCTIONS
Continue to follow up with Dr. Dao for BP management.  Follow up with your GI doctor about your ostomy output. I will send message today.  Stop sodium bicarbonate for now.  Continue low sodium diet.  Avoid NSAID (ibuprofen, advil, motrin, aleve, naprosyn, naproxen, [place], Goody's Powder). Tylenol is okay.  Avoid bactrim/ IV contrast/ gadolinium/ aminoglycoside where possible.  Avoid herbal supplements.  Stay hydrated.  Return to clinic in early May with labs (bloodwork and urine) or sooner if needed.  Go to the ER with any emergency concerns.

## 2020-02-20 DIAGNOSIS — R19.8 HIGH OUTPUT ILEOSTOMY: Primary | ICD-10-CM

## 2020-02-20 DIAGNOSIS — E11.9 TYPE 2 DIABETES MELLITUS WITHOUT COMPLICATION, UNSPECIFIED WHETHER LONG TERM INSULIN USE: ICD-10-CM

## 2020-02-20 DIAGNOSIS — Z93.2 HIGH OUTPUT ILEOSTOMY: Primary | ICD-10-CM

## 2020-02-20 RX ORDER — DIPHENOXYLATE HCL/ATROPINE 2.5-.025/5
5 LIQUID (ML) ORAL 4 TIMES DAILY PRN
Qty: 600 ML | Refills: 0 | Status: SHIPPED | OUTPATIENT
Start: 2020-02-20 | End: 2020-03-21

## 2020-02-20 NOTE — PROGRESS NOTES
Called patient to discuss ileostomy output.  Emptying bag 10-15 times per day.  He is drinking substantial amounts of water and Gatorade.  Not taking any Imodium or Lomotil.    Recommended starting Imodium 2 mg b.i.d..  The goal is to empty the ileostomy bag 4-6 times per day.  If the imodium 2 mg twice per day is ineffective, recommended increasing to Imodium 2 mg q.i.d..  Also send in a prescription for liquid Lomotil to be used if he starts to have increased output.     I will follow up with him in a week.    CED Haley MD, FACS  Staff Surgeon  Colon [facility] Rectal Surgery

## 2020-02-26 ENCOUNTER — TELEPHONE (OUTPATIENT)
Dept: NEPHROLOGY | Facility: CLINIC | Age: 70
End: 2020-02-26

## 2020-02-26 NOTE — TELEPHONE ENCOUNTER
Spoke to Dr. Dao's nurse Emilia. She said he did not receive message/ CC'd chart on Epic. Asked her to give Dr. Dao the message that it is okay from renal standpoint to d/c lisinopril. She said it has already been stopped. Pt is now on coreg and hydralazine.

## 2020-03-10 ENCOUNTER — TELEPHONE (OUTPATIENT)
Dept: SURGERY | Facility: CLINIC | Age: 70
End: 2020-03-10

## 2020-03-10 NOTE — TELEPHONE ENCOUNTER
Now taking 1 lomotil in the morning.  Stool with a thicker consistency.  Better urination.    Discussed that he should increase to Lomotil BID.    I will follow up with him.     CED Haley MD, FACS  Staff Surgeon  Colon & Rectal Surgery      ----- Message from Bianka Arevalo NP sent at 2/19/2020 12:04 PM CST -----  Alicia Haley,  I understand you follow this pt for is ileostomy.  He has had frequent AKIs 2/2 volume depletion from high ostomy output. He says he drinks about 96 oz of water daily, but he has had multiple hospitalizations for volume depletion because he cannot keep up with replenishing the fluid lost through his ostomy.  Do you have any suggestions/ can I defer to you to follow up with him on this issue?  Thanks,  YON PandeyC

## 2020-04-02 DIAGNOSIS — I10 ESSENTIAL HYPERTENSION: Primary | ICD-10-CM

## 2020-04-02 RX ORDER — CARVEDILOL 3.12 MG/1
1 TABLET ORAL 3 TIMES DAILY
COMMUNITY
Start: 2020-03-31 | End: 2020-04-02 | Stop reason: SDUPTHER

## 2020-04-02 RX ORDER — CARVEDILOL 3.12 MG/1
TABLET ORAL
Qty: 90 TABLET | Refills: 3 | Status: SHIPPED | OUTPATIENT
Start: 2020-04-02 | End: 2020-08-11

## 2020-04-02 NOTE — TELEPHONE ENCOUNTER
While on the phone discussing his wife's hospital d/c pt. Requesting refill on BP medication Carvedilol 3.125 mg

## 2020-04-17 ENCOUNTER — OFFICE VISIT (OUTPATIENT)
Dept: PRIMARY CARE CLINIC | Facility: CLINIC | Age: 70
End: 2020-04-17
Payer: MEDICARE

## 2020-04-17 DIAGNOSIS — J90 PLEURAL EFFUSION ON LEFT: ICD-10-CM

## 2020-04-17 DIAGNOSIS — N18.3 ACUTE RENAL FAILURE SUPERIMPOSED ON STAGE 3 CHRONIC KIDNEY DISEASE, UNSPECIFIED ACUTE RENAL FAILURE TYPE: ICD-10-CM

## 2020-04-17 DIAGNOSIS — K21.9 GASTROESOPHAGEAL REFLUX DISEASE, ESOPHAGITIS PRESENCE NOT SPECIFIED: ICD-10-CM

## 2020-04-17 DIAGNOSIS — Z13.220 ENCOUNTER FOR LIPID SCREENING FOR CARDIOVASCULAR DISEASE: ICD-10-CM

## 2020-04-17 DIAGNOSIS — Z13.6 ENCOUNTER FOR LIPID SCREENING FOR CARDIOVASCULAR DISEASE: ICD-10-CM

## 2020-04-17 DIAGNOSIS — T88.7XXA SIDE EFFECT OF MEDICATION: Primary | ICD-10-CM

## 2020-04-17 DIAGNOSIS — Z12.5 SCREENING FOR PROSTATE CANCER: ICD-10-CM

## 2020-04-17 DIAGNOSIS — N17.9 ACUTE RENAL FAILURE SUPERIMPOSED ON STAGE 3 CHRONIC KIDNEY DISEASE, UNSPECIFIED ACUTE RENAL FAILURE TYPE: ICD-10-CM

## 2020-04-17 DIAGNOSIS — E11.9 TYPE 2 DIABETES MELLITUS WITHOUT COMPLICATION, WITHOUT LONG-TERM CURRENT USE OF INSULIN: ICD-10-CM

## 2020-04-17 PROCEDURE — 3051F PR MOST RECENT HEMOGLOBIN A1C LEVEL 7.0 - < 8.0%: ICD-10-PCS | Mod: CPTII,95,, | Performed by: INTERNAL MEDICINE

## 2020-04-17 PROCEDURE — 1159F MED LIST DOCD IN RCRD: CPT | Mod: 95,,, | Performed by: INTERNAL MEDICINE

## 2020-04-17 PROCEDURE — 99442 PR PHYSICIAN TELEPHONE EVALUATION 11-20 MIN: ICD-10-PCS | Mod: 95,,, | Performed by: INTERNAL MEDICINE

## 2020-04-17 PROCEDURE — 1101F PR PT FALLS ASSESS DOC 0-1 FALLS W/OUT INJ PAST YR: ICD-10-PCS | Mod: CPTII,95,, | Performed by: INTERNAL MEDICINE

## 2020-04-17 PROCEDURE — 99442 PR PHYSICIAN TELEPHONE EVALUATION 11-20 MIN: CPT | Mod: 95,,, | Performed by: INTERNAL MEDICINE

## 2020-04-17 PROCEDURE — 3051F HG A1C>EQUAL 7.0%<8.0%: CPT | Mod: CPTII,95,, | Performed by: INTERNAL MEDICINE

## 2020-04-17 PROCEDURE — 1101F PT FALLS ASSESS-DOCD LE1/YR: CPT | Mod: CPTII,95,, | Performed by: INTERNAL MEDICINE

## 2020-04-17 PROCEDURE — 1159F PR MEDICATION LIST DOCUMENTED IN MEDICAL RECORD: ICD-10-PCS | Mod: 95,,, | Performed by: INTERNAL MEDICINE

## 2020-04-17 RX ORDER — OMEPRAZOLE 40 MG/1
40 CAPSULE, DELAYED RELEASE ORAL DAILY
Qty: 90 CAPSULE | Refills: 3 | Status: SHIPPED | OUTPATIENT
Start: 2020-04-17 | End: 2021-04-07 | Stop reason: CLARIF

## 2020-04-18 NOTE — PROGRESS NOTES
Subjective:    The patient location is: home  The chief complaint leading to consultation is: drug reaction  Visit type: audio only  Total time spent with patient: 12 minutes  Each patient to whom he or she provides medical services by telemedicine is:  (1) informed of the relationship between the physician and patient and the respective role of any other health care provider with respect to management of the patient; and (2) notified that he or she may decline to receive medical services by telemedicine and may withdraw from such care at any time.    Notes:    Patient ID: Jarrett Charlton Jr. is a 70 y.o. male.  Patient was seen by telemedicine audio visit only not able to set a virtual visit  Chief Complaint: No chief complaint on file.    HPI  patient main complain and hitting head reaction to the Protonix that he was prescribed for acid reflux disease his states that since his on Protonix he fell numbness from the left hip to the left knee and left leg numbness he feel better when he stops the medication he denies skin rash short of breath chest pain his colostomy functioning well no high output or diarrhea he does complained of short of breath mainly in the morning and his diabetes his Accu-Chek slightly worse  was in the 170/180 sometime he currently on glimepiride 4 mg twice a day he cannot take metformin due to diarrhea and high output colostomy  with dehydration which currently in the control his acute renal failure also resolved when the high output colostomy improved.  Also review patient x-ray in the past chest x-ray have left pleural effusion will need repeat chest x-ray since patient has short of breath in the morning  Review of Systems    Objective:      Physical Exam  on the phone patient appear in no distress little frustrated had home taking care of of his wife with multiple medical condition and not getting enough help he currently deny any physical symptom abnormalities  Assessment:       1. Side  effect of medication    2. Gastroesophageal reflux disease, esophagitis presence not specified    3. Type 2 diabetes mellitus without complication, without long-term current use of insulin    4. Acute renal failure superimposed on stage 3 chronic kidney disease, unspecified acute renal failure type    5. Pleural effusion on left    6. Encounter for lipid screening for cardiovascular disease    7. Screening for prostate cancer        Plan:       Side effect of medication  Comments:  Will discontinue Protonix and resume his omeprazole which he took before without any problem    Gastroesophageal reflux disease, esophagitis presence not specified  -     omeprazole (PRILOSEC) 40 MG capsule; Take 1 capsule (40 mg total) by mouth once daily.  Dispense: 90 capsule; Refill: 3    Type 2 diabetes mellitus without complication, without long-term current use of insulin  -     SITagliptin (JANUVIA) 50 MG Tab; Take 1 tablet (50 mg total) by mouth once daily.  Dispense: 90 tablet; Refill: 1  -     CBC auto differential; Future; Expected date: 04/18/2020  -     Comprehensive metabolic panel; Future; Expected date: 04/18/2020  -     Hemoglobin A1c; Future; Expected date: 04/18/2020  -     POCT urine dipstick without microscope; Future; Expected date: 04/18/2020    Acute renal failure superimposed on stage 3 chronic kidney disease, unspecified acute renal failure type  -     Comprehensive metabolic panel; Future; Expected date: 04/18/2020    Pleural effusion on left  -     X-Ray Chest PA And Lateral; Future; Expected date: 04/18/2020    Encounter for lipid screening for cardiovascular disease  -     Lipid panel; Future; Expected date: 04/18/2020    Screening for prostate cancer  -     PSA, Screening; Future; Expected date: 04/18/2020

## 2020-04-24 DIAGNOSIS — R93.89 ABNORMAL CXR: Primary | ICD-10-CM

## 2020-04-27 ENCOUNTER — OFFICE VISIT (OUTPATIENT)
Dept: UROLOGY | Facility: CLINIC | Age: 70
End: 2020-04-27
Payer: MEDICARE

## 2020-04-27 ENCOUNTER — PATIENT OUTREACH (OUTPATIENT)
Dept: ADMINISTRATIVE | Facility: OTHER | Age: 70
End: 2020-04-27

## 2020-04-27 DIAGNOSIS — C61 PROSTATE CANCER: ICD-10-CM

## 2020-04-27 DIAGNOSIS — R97.20 ELEVATED PSA: Primary | ICD-10-CM

## 2020-04-27 PROCEDURE — 1159F MED LIST DOCD IN RCRD: CPT | Mod: 95,,, | Performed by: UROLOGY

## 2020-04-27 PROCEDURE — 1101F PR PT FALLS ASSESS DOC 0-1 FALLS W/OUT INJ PAST YR: ICD-10-PCS | Mod: CPTII,95,, | Performed by: UROLOGY

## 2020-04-27 PROCEDURE — 1159F PR MEDICATION LIST DOCUMENTED IN MEDICAL RECORD: ICD-10-PCS | Mod: 95,,, | Performed by: UROLOGY

## 2020-04-27 PROCEDURE — 99443 PR PHYSICIAN TELEPHONE EVALUATION 21-30 MIN: ICD-10-PCS | Mod: 95,,, | Performed by: UROLOGY

## 2020-04-27 PROCEDURE — 99443 PR PHYSICIAN TELEPHONE EVALUATION 21-30 MIN: CPT | Mod: 95,,, | Performed by: UROLOGY

## 2020-04-27 PROCEDURE — 1101F PT FALLS ASSESS-DOCD LE1/YR: CPT | Mod: CPTII,95,, | Performed by: UROLOGY

## 2020-04-27 NOTE — PROGRESS NOTES
Care Everywhere: updated  Immunization: updated  Health Maintenance: updated  Media Review: reviewed for possible outside eye exam report  Legacy Review: n/a  Order placed: n/a  Upcoming appts:n/a

## 2020-04-27 NOTE — PROGRESS NOTES
Established Patient - Audio Only Telehealth Visit     The patient location is: home  The chief complaint leading to consultation is: elevated PSA  Visit type: Virtual visit with audio only (telephone)     The reason for the audio only service rather than synchronous audio and video virtual visit was related to technical difficulties or patient preference/necessity.     Each patient to whom I provide medical services by telemedicine is:  (1) informed of the relationship between the physician and patient and the respective role of any other health care provider with respect to management of the patient; and (2) notified that they may decline to receive medical services by telemedicine and may withdraw from such care at any time. Patient verbally consented to receive this service via voice-only telephone call.       HPI:   CC:  Elevated PSA    Jarrett Charlton Jr. is a 70 y.o. man who is here for the evaluation of elevated PSA.  He would like to discuss possible treatment for rising PSA.    a history of elevated PSA, negative TUR biopsy in January. History of APR for crohn's disease.  MRI- 15 ccs. PI-RADS 4, 1 cm lesion, right base PZ. Negative extra prostatic extension,NVB,SV nodes.    Hx of no rectum as well as concerning PIRADS lesion on MRI.   Had TURP for biopsying suspicious area      Date: 01/23/2019  Procedure(s) Performed:   TURP  Findings:   Open bladder neck some regrowth of adenoma and suspicious area in right mid prostate   Right side of prostate resected and sent for pathology  SPECIMEN  1) Right prostate chips.  FINAL PATHOLOGIC DIAGNOSIS  Right prostate gland, transurethral resection:  - Benign prostatic tissue with nodular stromal hyperplasia  - Negative for malignancy    Since TURP, he noted that he can void better with better urine flow.  Blood in urine resolved.  However, he reports Occasional ZEB but it got all better.  No more ZEB reported.  He is here with another MRI of prostate following TURP  because of still rising PSA up to 6.3 from 4.1.    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  He was not interested in SUDS.  S/p colon surgery and his anus is closed.  Therefore dong TRUS bx of prostate is not feasible.  After discussion, we decided to try finasteride to see whether it will lower his PSA.   He has been on finasteride for the past couple of years. Following TURP in 1/2019, we decided to stop taking finasteride.    When he was considered to undergo TURP, we could not do his surgery because he was not able to stop ASA or Plavix due to fresh vascular stent.  He got a clearance that he can hold off plavix and ASA prior to TURP.  Hx of ulcerative colitis, had colon surgery back in 1985 and his anus was closed.  Has a neuropathy on the right leg.  Hx of sciatic nerve on both sides and received injection on the back.  Hx of diabetes diagnosed 2 years ago.  No family hx of prostate cancer.  He is a .  Denies flank pain, dysuira, hematuria .      MRI of prostate 8/20/19  Previous biopsy: Negative on 01/23/2019  PSA: 6.3 ng/mL (08/16/2019)  Prior therapy: TURP    Prostate: 3.6 x 3.5 x 3.0 cm corresponding to a computed volume of 15.2 cc.    Peripheral zone: Focal abnormalities with imaging features concerning for prostate cancer.  Lesion (MARYCARMEN) #1  Location: Side: right; Region: base; Zone: posterior peripheral zone laterally  Greatest dimension: 1.0 cm  T2-WI: T2 SI: circumscribed, homogeneous moderate hypointensity--score 4 or 5  DWI/ADC: DWI: Focal markedly hypointense on ADC and markedly hyperintense on high b-value DWI--score 4 or 5  DCE: Positive  Extraprostatic extension: No gross extraprostatic extension noting postsurgical changes limits evaluation.    PI-RADS assessment category: 4    Transitional zone: Mostly surgically resected.  Neurovascular bundle: Normal.  Seminal vesicles:  SV invasion:  None  Adjacent Organ Involvement: No focal bladder wall thickening.  No rectal involvement.  Lymphadenopathy: None    On 11/22/19, pt underwent perineal biopsy by IR for CT guided needle bx for the right posterolateral prostate lesion;  Elevated prostate specific antigen (PSA)  Pathology:  BIOPSY OF PROSTATE BED:  FIBROFATTY TISSUE WITH NO NEOPLASIA IDENTIFIED    Pt had his PSA repeated and is now elevated to 7.9.  He is anxious about possible prostate cancer and would like to to something about it.    Past Medical History:   Diagnosis Date    Basal cell carcinoma     BPH (benign prostatic hyperplasia)     s/p TURP    Carotid stenosis     Chronic kidney disease     Claudication     Coronary artery disease     DDD (degenerative disc disease) 10/21/2013    Diabetes mellitus with renal complications     Disc disease, degenerative, cervical     Encounter for blood transfusion     GERD (gastroesophageal reflux disease)     Gout, chronic     History of ulcerative colitis     s/p colectomy and ileostomy    HLD (hyperlipidemia)     HTN (hypertension)     Ileostomy in place 1982    RBBB     Squamous cell carcinoma 03/08/2018    Left superior helix near insertion    Squamous cell carcinoma 04/12/2018    Left forearm x 5    Ventricular tachycardia      Past Surgical History:   Procedure Laterality Date    cardiac stents      CATARACT EXTRACTION Bilateral     CERVICAL FUSION      colectomy and ileostomy  1985    TRANSURETHRAL RESECTION OF PROSTATE (TURP) WITHOUT USE OF LASER N/A 1/23/2019    Procedure: TURP, WITHOUT USING LASER BIPOLAR;  Surgeon: Catarino Mota MD;  Location: [facility] OR [address] [place] [address];  Service: Urology;  Laterality: N/A;  1.5 HOURS     Social History     Tobacco Use    Smoking status: Never Smoker    Smokeless tobacco: Never Used   Substance Use Topics    Alcohol use: No    Drug use: No     Family History   Problem Relation Age of Onset    Cancer Father     Diabetes Father     Dementia  Mother     Melanoma Neg Hx     Hypertension Neg Hx     Arthritis Neg Hx      Allergy:  No Known Allergies  Outpatient Encounter Medications as of 4/27/2020   Medication Sig Dispense Refill    allopurinol (ZYLOPRIM) 300 MG tablet Take 1.5 tablets (450 mg total) by mouth once daily. 135 tablet 3    aspirin (ECOTRIN) 81 MG EC tablet Take 81 mg by mouth once daily.        BD ALCOHOL SWABS PadM       blood sugar diagnostic (ONE TOUCH ULTRA TEST) Strp USE ONE STRIP TO CHECK GLUCOSE EVERY  each 11    brimonidine 0.2% (ALPHAGAN) 0.2 % Drop INSTILL 1 DROP INTO EACH EYE TWICE DAILY  4    carvediloL (COREG) 3.125 MG tablet TAKE 1 TABLET BY MOUTH IN THE MORNING AND TWO TABLETS BY MOUTH AT BEDTIME 90 tablet 3    clopidogrel (PLAVIX) 75 mg tablet once daily.       ergocalciferol, vitamin D2, (VITAMIN D ORAL) Take by mouth.      gabapentin (NEURONTIN) 600 MG tablet Take 1 tablet (600 mg total) by mouth once daily.      glimepiride (AMARYL) 4 MG tablet Take 1 tablet (4 mg total) by mouth 2 (two) times daily with meals. 180 tablet 3    levocetirizine (XYZAL) 5 MG tablet Take 1 tablet (5 mg total) by mouth every evening. For nasal drip congestion (Patient not taking: Reported on 2/19/2020) 30 tablet 1    lisinopril 10 MG tablet       omeprazole (PRILOSEC) 40 MG capsule Take 1 capsule (40 mg total) by mouth once daily. 90 capsule 3    pravastatin (PRAVACHOL) 80 MG tablet Take 80 mg by mouth every evening.      SITagliptin (JANUVIA) 50 MG Tab Take 1 tablet (50 mg total) by mouth once daily. 90 tablet 1    sodium bicarbonate 650 MG tablet Take 2 tablets (1,300 mg total) by mouth 4 (four) times daily. (Patient not taking: Reported on 2/19/2020) 720 tablet 3     Facility-Administered Encounter Medications as of 4/27/2020   Medication Dose Route Frequency Provider Last Rate Last Dose    leuprolide (6 month) injection 45 mg  45 mg Intramuscular Q6 Months Catarino Mota MD        leuprolide (6 month) injection 45  mg  45 mg Intramuscular Q6 Months Catarino Mota MD         Review of Systems   General ROS: GENERAL:  No weight gain or loss  SKIN:  No rashes or lacerations  HEAD:  No headaches  EYES:  No exophthalmos, jaundice or blindness  EARS:  No dizziness, tinnitus or hearing loss  NOSE:  No changes in smell  MOUTH & THROAT:  No dyskinetic movements or obvious goiter  CHEST:  No shortness of breath, hyperventilation or cough  CARDIOVASCULAR:  No tachycardia or chest pain  ABDOMEN:  No nausea, vomiting, pain, constipation or diarrhea  URINARY:  No frequency, dysuria or sexual dysfunction  ENDOCRINE:  No polydipsia, polyuria  MUSCULOSKELETAL:  No pain or stiffness of the joints  NEUROLOGIC:  No weakness, sensory changes, seizures, confusion, memory loss, tremor or other abnormal movements  Physical Exam     There were no vitals filed for this visit.      LABS:  Lab Results   Component Value Date    PSA 7.9 (H) 04/20/2020    PSA 2.1 09/15/2014    PSA 2.16 05/13/2013    PSADIAG 6.3 (H) 08/16/2019    PSADIAG 4.1 (H) 11/21/2018    PSADIAG 3.3 06/15/2018     Lab Results   Component Value Date    CREATININE 1.4 04/20/2020    CREATININE 1.6 (H) 02/14/2020    CREATININE 1.7 (H) 02/10/2020     MRI of prostate 12/9/18  Redemonstration of a focal lesion along the posterior margin of the TURP resection cavity concerning for prostatic cancer.  Lesion is grossly unchanged from examination dated 06/19/2018.    Overall Assessment:    PIRADS 4 (clinically significant cancer is likely to be present)    Number of targets created for potential MR/US fusion biopsy    Residual prostatic tissue: 1    TURP Pathology 1/23/19  SPECIMEN  1) Right prostate chips.  FINAL PATHOLOGIC DIAGNOSIS  Right prostate gland, transurethral resection:  - Benign prostatic tissue with nodular stromal hyperplasia  - Negative for malignancy    Assessment and Plan:  Diagnoses and all orders for this visit:    Elevated PSA  -     leuprolide (6 month) injection 45  mg    Prostate cancer  -     Prior authorization Order  -     leuprolide (6 month) injection 45 mg    so far we were not able to make a diagnosis of prostate cancer related to his elevated PSA.  However, MRI on 8/20/19 showed PIRAD 4 lesion at the right posterior peripheral area.  No extracapsular disease noted.  He is concerned about rising PSA and would like to hernandez something about it.  I explained that even when a patient is diagnosed with prostate cancer, we may sometimes watch it without therapy for low grade / low risk cancer.  However, he is very nervous and anxious about his possible prostate cancer based on rising PSA.  Thus we discussed potential treatment.  I recommended ADT usign Lupron injection.  Potential risks and benefits discussed.    Potential side effects including, but not limited to, lack of libido, ED, hot flashes, increased risk of cardiovascular event, osteoporosis explained.  He understands and would like to receive a lupron injection.    I spent 40 minutes with the patient of which more than half was spent in direct consultation with the patient in regards to our treatment and plan.      Follow-up:  Follow up in about 2 weeks (around 5/11/2020) for lupron injection.    This service was not originating from a related E/M service provided within the previous 7 days nor will  to an E/M service or procedure within the next 24 hours or my soonest available appointment.  Prevailing standard of care was able to be met in this audio-only visit.

## 2020-04-28 ENCOUNTER — OFFICE VISIT (OUTPATIENT)
Dept: PRIMARY CARE CLINIC | Facility: CLINIC | Age: 70
End: 2020-04-28
Payer: MEDICARE

## 2020-04-28 VITALS
HEART RATE: 84 BPM | HEIGHT: 74 IN | WEIGHT: 217 LBS | BODY MASS INDEX: 27.85 KG/M2 | RESPIRATION RATE: 16 BRPM | SYSTOLIC BLOOD PRESSURE: 138 MMHG | DIASTOLIC BLOOD PRESSURE: 86 MMHG | OXYGEN SATURATION: 98 % | TEMPERATURE: 98 F

## 2020-04-28 DIAGNOSIS — Z93.2 PRESENCE OF ILEOSTOMY: ICD-10-CM

## 2020-04-28 DIAGNOSIS — E11.9 TYPE 2 DIABETES MELLITUS WITHOUT COMPLICATION, WITHOUT LONG-TERM CURRENT USE OF INSULIN: Primary | ICD-10-CM

## 2020-04-28 DIAGNOSIS — R97.20 ELEVATED PSA: ICD-10-CM

## 2020-04-28 PROCEDURE — 99999 PR PBB SHADOW E&M-EST. PATIENT-LVL III: CPT | Mod: PBBFAC,,, | Performed by: INTERNAL MEDICINE

## 2020-04-28 PROCEDURE — 3044F HG A1C LEVEL LT 7.0%: CPT | Mod: CPTII,S$GLB,, | Performed by: INTERNAL MEDICINE

## 2020-04-28 PROCEDURE — 3075F PR MOST RECENT SYSTOLIC BLOOD PRESS GE 130-139MM HG: ICD-10-PCS | Mod: CPTII,S$GLB,, | Performed by: INTERNAL MEDICINE

## 2020-04-28 PROCEDURE — 99999 PR PBB SHADOW E&M-EST. PATIENT-LVL III: ICD-10-PCS | Mod: PBBFAC,,, | Performed by: INTERNAL MEDICINE

## 2020-04-28 PROCEDURE — 1126F AMNT PAIN NOTED NONE PRSNT: CPT | Mod: S$GLB,,, | Performed by: INTERNAL MEDICINE

## 2020-04-28 PROCEDURE — 1159F PR MEDICATION LIST DOCUMENTED IN MEDICAL RECORD: ICD-10-PCS | Mod: S$GLB,,, | Performed by: INTERNAL MEDICINE

## 2020-04-28 PROCEDURE — 1101F PR PT FALLS ASSESS DOC 0-1 FALLS W/OUT INJ PAST YR: ICD-10-PCS | Mod: CPTII,S$GLB,, | Performed by: INTERNAL MEDICINE

## 2020-04-28 PROCEDURE — 1101F PT FALLS ASSESS-DOCD LE1/YR: CPT | Mod: CPTII,S$GLB,, | Performed by: INTERNAL MEDICINE

## 2020-04-28 PROCEDURE — 3079F PR MOST RECENT DIASTOLIC BLOOD PRESSURE 80-89 MM HG: ICD-10-PCS | Mod: CPTII,S$GLB,, | Performed by: INTERNAL MEDICINE

## 2020-04-28 PROCEDURE — 1126F PR PAIN SEVERITY QUANTIFIED, NO PAIN PRESENT: ICD-10-PCS | Mod: S$GLB,,, | Performed by: INTERNAL MEDICINE

## 2020-04-28 PROCEDURE — 99213 OFFICE O/P EST LOW 20 MIN: CPT | Mod: S$GLB,,, | Performed by: INTERNAL MEDICINE

## 2020-04-28 PROCEDURE — 3079F DIAST BP 80-89 MM HG: CPT | Mod: CPTII,S$GLB,, | Performed by: INTERNAL MEDICINE

## 2020-04-28 PROCEDURE — 3075F SYST BP GE 130 - 139MM HG: CPT | Mod: CPTII,S$GLB,, | Performed by: INTERNAL MEDICINE

## 2020-04-28 PROCEDURE — 99213 PR OFFICE/OUTPT VISIT, EST, LEVL III, 20-29 MIN: ICD-10-PCS | Mod: S$GLB,,, | Performed by: INTERNAL MEDICINE

## 2020-04-28 PROCEDURE — 1159F MED LIST DOCD IN RCRD: CPT | Mod: S$GLB,,, | Performed by: INTERNAL MEDICINE

## 2020-04-28 PROCEDURE — 3044F PR MOST RECENT HEMOGLOBIN A1C LEVEL <7.0%: ICD-10-PCS | Mod: CPTII,S$GLB,, | Performed by: INTERNAL MEDICINE

## 2020-04-28 RX ORDER — LANCETS
1 EACH MISCELLANEOUS DAILY
COMMUNITY
Start: 2020-03-19 | End: 2023-01-19 | Stop reason: SDUPTHER

## 2020-04-28 RX ORDER — CLONIDINE HYDROCHLORIDE 0.1 MG/1
0.5 TABLET ORAL DAILY
COMMUNITY
Start: 2020-04-24 | End: 2020-11-06 | Stop reason: CLARIF

## 2020-04-28 RX ORDER — BLOOD GLUCOSE CONTROL HIGH,LOW
1 EACH MISCELLANEOUS DAILY
COMMUNITY
Start: 2020-02-21

## 2020-04-28 RX ORDER — AMLODIPINE BESYLATE 5 MG/1
5 TABLET ORAL DAILY
COMMUNITY
Start: 2020-03-17 | End: 2020-11-03 | Stop reason: SDUPTHER

## 2020-04-28 NOTE — PROGRESS NOTES
Subjective:       Patient ID: Jarrett Charlton Jr. is a 70 y.o. male.    Chief Complaint: Diabetes and other (review labs)    HPI  Review of Systems    Objective:      Physical Exam    Assessment:       No diagnosis found.    Plan:       There are no diagnoses linked to this encounter.

## 2020-04-28 NOTE — PROGRESS NOTES
Patient ID: Jarrett Charlton Jr. is a 70 y.o. male.    Chief Complaint: Diabetes and other (review labs)    HPI  patient here for routine follow-up he does not have any physical symptom reviewed blood test with patient diabetes fairly control patient currently on glimepiride 2 mg twice a day his colostomy functioning well no loose stools no dehydration he also have elevated PSA level being seen by urologist he going to get anti testosterone treatment patient denies short of breath chest pain patient id taking care of his wife who is very sick with multiple debilitating irreversible conditions  Review of Systems    Objective:      Physical Exam   Constitutional: He is oriented to person, place, and time. He appears well-developed and well-nourished. No distress.   HENT:   Head: Normocephalic and atraumatic.   Right Ear: External ear normal.   Left Ear: External ear normal.   Nose: Nose normal.   Mouth/Throat: Oropharynx is clear and moist. No oropharyngeal exudate.   Eyes: Pupils are equal, round, and reactive to light. Conjunctivae and EOM are normal. Right eye exhibits no discharge. Left eye exhibits no discharge.   Neck: Normal range of motion. Neck supple. No thyromegaly present.   Cardiovascular: Normal rate, regular rhythm, normal heart sounds and intact distal pulses. Exam reveals no gallop and no friction rub.   No murmur heard.  Pulmonary/Chest: Effort normal and breath sounds normal. No respiratory distress. He has no wheezes. He has no rales. He exhibits no tenderness.   Abdominal: Soft. Bowel sounds are normal. He exhibits no distension. There is no tenderness. There is no rebound and no guarding.   Musculoskeletal: Normal range of motion. He exhibits no edema, tenderness or deformity.   Lymphadenopathy:     He has no cervical adenopathy.   Neurological: He is alert and oriented to person, place, and time.   Skin: Skin is warm and dry. Capillary refill takes less than 2 seconds. No rash noted. No  erythema.   Psychiatric: He has a normal mood and affect. Judgment and thought content normal.   Nursing note and vitals reviewed.      Assessment:       1. Type 2 diabetes mellitus without complication, without long-term current use of insulin    2. Elevated PSA    3. Presence of ileostomy        Plan:       Type 2 diabetes mellitus without complication, without long-term current use of insulin  -     linaGLIPtin (TRADJENTA) 5 mg Tab tablet; Take 1 tablet (5 mg total) by mouth once daily.  Dispense: 90 tablet; Refill: 3    Elevated PSA  Comments:  Patient being evaluated by Urology no invasive treatment of procedure yet recommend a trial of anti testosterone IM    Presence of ileostomy  Comments:  Currently stable no dehydration from high output

## 2020-04-30 NOTE — PROGRESS NOTES
Subjective:       Patient ID: Jarrett Charlton Jr. is a 70 y.o. White male who presents for follow-up evaluation of   CKD, hyperkalemia    Established Patient - Audio Only Telehealth Visit     The patient location is: home  The chief complaint leading to consultation is: f/u CKD  Visit type: Virtual visit with audio only (telephone)  Total time spent with patient: 32 minutes     The reason for the audio only service rather than synchronous audio and video virtual visit was related to technical difficulties or patient preference/necessity.     Each patient to whom I provide medical services by telemedicine is:  (1) informed of the relationship between the physician and patient and the respective role of any other health care provider with respect to management of the patient; and (2) notified that they may decline to receive medical services by telemedicine and may withdraw from such care at any time. Patient verbally consented to receive this service via voice-only telephone call.      HPI    Last seen by me in Feb 2019. Previously seen by Dr. Lyon in Aug 2019.  Prior pertinent chart reviewed since this is patient's first appointment with me.    Patient presents for f/u of CKD.  Baseline creatinine of 1.3-1.5 c frequent increases to 1.6-1.8 range an occasional AKIs c higher sCr. Had GRADY in late December c peak sCr of 2.3 mg/dL 2/2 volume depletion from high output in ileostomy. Has had hypomagnesemia in the past.  Had K of 5.9 in November; provided with K finder.    Home BPs: 127/93, 102/74,131/89, 116/88;  Says he drinks 96 oz water daily.    Significant hx includes Hypertension for 5 years, DM for 3 yrs, gout, ulcerative colitis with ostomy and hyperlipidemia.     The patient denies taking NSAIDs, herbal supplements, recreational drugs, recent episode of dehydration, nausea or vomiting. No recent hospitalizations, no acute illnesses.  Had IV contrast with cardiologist for MRI on 4/3/2020. Ostomy output has  decreased and urine output has increased.    Social history: retired, wife wheelchair bound  Significant family hx includes: no known kidney disease in family.    Last renal US: 2019, reviewed. Simple left renal cyst.    Review of Systems   Constitutional: Positive for fatigue.   Respiratory: Positive for shortness of breath (in mornings). Negative for cough.    Cardiovascular: Negative for chest pain, palpitations and leg swelling.   Gastrointestinal: Negative for constipation, diarrhea, nausea and vomiting.        Reports ostomy   Genitourinary: Negative for difficulty urinating, dysuria, frequency, hematuria and urgency.        Pt says urine output has increased with decreased ostomy output   Neurological: Negative for dizziness, syncope, weakness and light-headedness.        Neuropathy to legs       Objective:       There were no vitals taken for this visit.  Physical Exam     - JONI; audio only    SCr: 1.4 mg/dL  Hgb: 13.5 g/dL  CO2: 26 mmol/L  K: 4.2 mmol/L  A1c: 6.7%  Ma.6 mg/dL  UPCR: negative  Outside labs 10/24/19  Crt: 1.5 mg/dL  Hgb: 15.2 g/dL  CO2: 20 mmol/L  K: 5.9 mmol/L  A1c: 7.1%    Assessment:       1. CKD (chronic kidney disease) stage 3, GFR 30-59 ml/min    2. Essential hypertension    3. Hyperkalemia    4. Altered bowel elimination due to intestinal ostomy        Plan:     CKD stage 3B c eGFR 43mL/min - 2/2 age-related nephron loss, HTN, previous NSAID use for gout, volume depletion episodes c high ostomy output.  No longer using NSAIDs;  Now with lower ostomy output on imodium.  At risk for progression given frequent AKIs.   UPCR No significant proteinuria. Lisinopril previously d/c to help decrease risk of frequent AKIs.  Need repeat UPCR.   Acid-base WNL.   Renal osteodystrophy PTH WNL. Ca WNL. On ergocalciferol.   Anemia At goal for CKD   DM At goal for CKD. On glimepiride.   Lipid Management On statin per PCP     HTN - Home BPs labile on amlodipine 2.5 mg, carvedilol 3.125 mg (one  tab in AM, two tabs in PM), clonidine 0.1 mg PRN (typically takes nightly). Diastolic BP tends to run on higher side.  Does not use salt in food but says he eats canned vegetables. Educated frozen is better than canned, but if he needs to eat canned, he should rinse vegetables with water before cooking. Educated to continue to take home BPs and bring list to cardiologist appts (Dr. Dao). BP has been managed by cardiologist.    Hyperkalemia - Has been WNL. Previously given K finder.    Intestinal ostomy - Previously with frequent liquid stools causing GRADY. Now on imodium per Dr. Haley with improvement in diarrhea.    All questions patient had were answered.  Asked if further questions. None. F/u in 3-4 mos with labs and urine prior to next visit or sooner if needed.  ER for emergency concerns.    Summary of Plan:  1. Repeat UA, UPCR  2. Low salt diet  3. RTC in 3-4 mos    This service was not originating from a related E/M service provided within the previous 7 days nor will  to an E/M service or procedure within the next 24 hours or my soonest available appointment.  Prevailing standard of care was able to be met in this audio-only visit.

## 2020-05-05 ENCOUNTER — OFFICE VISIT (OUTPATIENT)
Dept: NEPHROLOGY | Facility: CLINIC | Age: 70
End: 2020-05-05
Payer: MEDICARE

## 2020-05-05 ENCOUNTER — PATIENT OUTREACH (OUTPATIENT)
Dept: ADMINISTRATIVE | Facility: OTHER | Age: 70
End: 2020-05-05

## 2020-05-05 DIAGNOSIS — E87.5 HYPERKALEMIA: ICD-10-CM

## 2020-05-05 DIAGNOSIS — N18.30 CKD (CHRONIC KIDNEY DISEASE) STAGE 3, GFR 30-59 ML/MIN: Primary | ICD-10-CM

## 2020-05-05 DIAGNOSIS — K94.19 ALTERED BOWEL ELIMINATION DUE TO INTESTINAL OSTOMY: ICD-10-CM

## 2020-05-05 DIAGNOSIS — I10 ESSENTIAL HYPERTENSION: ICD-10-CM

## 2020-05-05 PROCEDURE — 99443 PR PHYSICIAN TELEPHONE EVALUATION 21-30 MIN: CPT | Mod: 95,,, | Performed by: NURSE PRACTITIONER

## 2020-05-05 PROCEDURE — 99443 PR PHYSICIAN TELEPHONE EVALUATION 21-30 MIN: ICD-10-PCS | Mod: 95,,, | Performed by: NURSE PRACTITIONER

## 2020-05-05 RX ORDER — LANOLIN ALCOHOL/MO/W.PET/CERES
400 CREAM (GRAM) TOPICAL DAILY
COMMUNITY
End: 2020-05-21

## 2020-05-05 RX ORDER — LOPERAMIDE HCL 2 MG
2 TABLET ORAL DAILY
COMMUNITY
End: 2021-04-07 | Stop reason: CLARIF

## 2020-05-05 NOTE — PATIENT INSTRUCTIONS
Get urine labs when you get x-ray.  Continue low sodium diet. Please be aware that canned foods have a lot of sodium/salt. Frozen vegetables have less salt than canned foods. If you can't get frozen vegetables, please rinse your canned vegetables well before cooking.  Avoid NSAID (ibuprofen, advil, motrin, aleve, naprosyn, naproxen, [place], Goody's Powder). Tylenol is okay.  Avoid bactrim/ IV contrast/ gadolinium/ aminoglycoside where possible.  Avoid herbal supplements.  Stay hydrated.  Return to clinic in 3-4 months with labs (bloodwork and urine) or sooner if needed.  Go to the ER with any emergency concerns.      Low-Salt Choices  Eating salt (sodium) can make your body retain too much water. Excess water makes your heart work harder. Canned, packaged, and frozen foods are easy to prepare, but they are often high in sodium. Here are some ideas for low-salt foods you can easily prepare yourself.    For breakfast  · Fruit or 100% fruit juice  · Whole-wheat bread or an English muffin. Compare sodium content on labels.  · Low-fat milk or yogurt  · Unsalted eggs  · Shredded wheat  · Corn tortillas  · Unsalted steamed rice  · Regular (not instant) hot cereal, made without salt  Stay away from:  · Sausage, thornton, and ham  · Flour tortillas  · Packaged muffins, pancakes, and biscuits  · Instant hot cereals  · Cottage cheese  For lunch and dinner  · Fresh fish, chicken, turkey, or meat--baked, broiled, or roasted without salt  · Dry beans, cooked without salt  · Tofu, stir-fried without salt  · Unsalted fresh fruit and vegetables, or frozen or canned fruit and vegetables with no added salt  Stay away from:  · Lunch or deli meat that is cured or smoked  · Cheese  · Tomato juice and catsup  · Canned vegetables, soups, and fish not labeled as no-salt-added or reduced sodium  · Packaged gravies and sauces  · Olives, pickles, and relish  · Bottled salad dressings  For snacks and desserts  · Yogurt  · Unsalted, air popped  popcorn  · Unsalted nuts or seeds  Stay away from:  · Pies and cakes  · Packaged dessert mixes  · Pizza  · Canned and packaged puddings  · Pretzels, chips, crackers, and nuts--unless the label says unsalted  Date Last Reviewed: 6/17/2015  © 6036-6098 [employer]. [address] [place] [address], [place] [address]. All rights reserved. This information is not intended as a substitute for professional medical care. Always follow your healthcare professional's instructions.

## 2020-05-18 ENCOUNTER — PATIENT OUTREACH (OUTPATIENT)
Dept: ADMINISTRATIVE | Facility: OTHER | Age: 70
End: 2020-05-18

## 2020-05-19 ENCOUNTER — OFFICE VISIT (OUTPATIENT)
Dept: UROLOGY | Facility: CLINIC | Age: 70
End: 2020-05-19
Payer: MEDICARE

## 2020-05-19 VITALS
HEIGHT: 74 IN | BODY MASS INDEX: 28.49 KG/M2 | WEIGHT: 222 LBS | HEART RATE: 86 BPM | DIASTOLIC BLOOD PRESSURE: 81 MMHG | SYSTOLIC BLOOD PRESSURE: 137 MMHG

## 2020-05-19 DIAGNOSIS — R97.20 ELEVATED PSA: Primary | ICD-10-CM

## 2020-05-19 PROCEDURE — 3079F PR MOST RECENT DIASTOLIC BLOOD PRESSURE 80-89 MM HG: ICD-10-PCS | Mod: CPTII,S$GLB,, | Performed by: UROLOGY

## 2020-05-19 PROCEDURE — 3288F PR FALLS RISK ASSESSMENT DOCUMENTED: ICD-10-PCS | Mod: CPTII,S$GLB,, | Performed by: UROLOGY

## 2020-05-19 PROCEDURE — 99213 OFFICE O/P EST LOW 20 MIN: CPT | Mod: 25,S$GLB,, | Performed by: UROLOGY

## 2020-05-19 PROCEDURE — 1100F PTFALLS ASSESS-DOCD GE2>/YR: CPT | Mod: CPTII,S$GLB,, | Performed by: UROLOGY

## 2020-05-19 PROCEDURE — 99999 PR PBB SHADOW E&M-EST. PATIENT-LVL V: ICD-10-PCS | Mod: PBBFAC,,, | Performed by: UROLOGY

## 2020-05-19 PROCEDURE — 1159F MED LIST DOCD IN RCRD: CPT | Mod: S$GLB,,, | Performed by: UROLOGY

## 2020-05-19 PROCEDURE — 3075F SYST BP GE 130 - 139MM HG: CPT | Mod: CPTII,S$GLB,, | Performed by: UROLOGY

## 2020-05-19 PROCEDURE — 1100F PR PT FALLS ASSESS DOC 2+ FALLS/FALL W/INJURY/YR: ICD-10-PCS | Mod: CPTII,S$GLB,, | Performed by: UROLOGY

## 2020-05-19 PROCEDURE — 96402 PR CHEMOTHER HORMON ANTINEOPL SUB-Q/IM: ICD-10-PCS | Mod: S$GLB,,, | Performed by: UROLOGY

## 2020-05-19 PROCEDURE — 99999 PR PBB SHADOW E&M-EST. PATIENT-LVL V: CPT | Mod: PBBFAC,,, | Performed by: UROLOGY

## 2020-05-19 PROCEDURE — 1126F PR PAIN SEVERITY QUANTIFIED, NO PAIN PRESENT: ICD-10-PCS | Mod: S$GLB,,, | Performed by: UROLOGY

## 2020-05-19 PROCEDURE — 3079F DIAST BP 80-89 MM HG: CPT | Mod: CPTII,S$GLB,, | Performed by: UROLOGY

## 2020-05-19 PROCEDURE — 3288F FALL RISK ASSESSMENT DOCD: CPT | Mod: CPTII,S$GLB,, | Performed by: UROLOGY

## 2020-05-19 PROCEDURE — 99213 PR OFFICE/OUTPT VISIT, EST, LEVL III, 20-29 MIN: ICD-10-PCS | Mod: 25,S$GLB,, | Performed by: UROLOGY

## 2020-05-19 PROCEDURE — 96402 CHEMO HORMON ANTINEOPL SQ/IM: CPT | Mod: S$GLB,,, | Performed by: UROLOGY

## 2020-05-19 PROCEDURE — 1159F PR MEDICATION LIST DOCUMENTED IN MEDICAL RECORD: ICD-10-PCS | Mod: S$GLB,,, | Performed by: UROLOGY

## 2020-05-19 PROCEDURE — 1126F AMNT PAIN NOTED NONE PRSNT: CPT | Mod: S$GLB,,, | Performed by: UROLOGY

## 2020-05-19 PROCEDURE — 3075F PR MOST RECENT SYSTOLIC BLOOD PRESS GE 130-139MM HG: ICD-10-PCS | Mod: CPTII,S$GLB,, | Performed by: UROLOGY

## 2020-05-19 NOTE — PROGRESS NOTES
CC:  Elevated PSA    Jarrett Charlton Jr. is a 70 y.o. man who is here for the evaluation of elevated PSA.  He would like to discuss possible treatment for rising PSA.    a history of elevated PSA, negative TUR biopsy in January. History of APR for crohn's disease.  MRI- 15 ccs. PI-RADS 4, 1 cm lesion, right base PZ. Negative extra prostatic extension,NVB,SV nodes.    Hx of no rectum as well as concerning PIRADS lesion on MRI.   Had TURP for biopsying suspicious area      Date: 01/23/2019  Procedure(s) Performed:   TURP  Findings:   Open bladder neck some regrowth of adenoma and suspicious area in right mid prostate   Right side of prostate resected and sent for pathology  SPECIMEN  1) Right prostate chips.  FINAL PATHOLOGIC DIAGNOSIS  Right prostate gland, transurethral resection:  - Benign prostatic tissue with nodular stromal hyperplasia  - Negative for malignancy    Since TURP, he noted that he can void better with better urine flow.  Blood in urine resolved.  However, he reports Occasional ZEB but it got all better.  No more ZEB reported.  He is here with another MRI of prostate following TURP because of still rising PSA up to 6.3 from 4.1.    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  He was not interested in SUDS.  S/p colon surgery and his anus is closed.  Therefore dong TRUS bx of prostate is not feasible.  After discussion, we decided to try finasteride to see whether it will lower his PSA.   He has been on finasteride for the past couple of years. Following TURP in 1/2019, we decided to stop taking finasteride.    When he was considered to undergo TURP, we could not do his surgery because he was not able to stop ASA or Plavix due to fresh vascular stent.  He got a clearance that he can hold off plavix and ASA prior to TURP.  Hx of ulcerative colitis, had colon surgery back in 1985 and his anus was closed.  Has a  neuropathy on the right leg.  Hx of sciatic nerve on both sides and received injection on the back.  Hx of diabetes diagnosed 2 years ago.  No family hx of prostate cancer.  He is a .  Denies flank pain, dysuira, hematuria .      MRI of prostate 8/20/19  Previous biopsy: Negative on 01/23/2019  PSA: 6.3 ng/mL (08/16/2019)  Prior therapy: TURP    Prostate: 3.6 x 3.5 x 3.0 cm corresponding to a computed volume of 15.2 cc.    Peripheral zone: Focal abnormalities with imaging features concerning for prostate cancer.  Lesion (MARYCARMEN) #1  Location: Side: right; Region: base; Zone: posterior peripheral zone laterally  Greatest dimension: 1.0 cm  T2-WI: T2 SI: circumscribed, homogeneous moderate hypointensity--score 4 or 5  DWI/ADC: DWI: Focal markedly hypointense on ADC and markedly hyperintense on high b-value DWI--score 4 or 5  DCE: Positive  Extraprostatic extension: No gross extraprostatic extension noting postsurgical changes limits evaluation.    PI-RADS assessment category: 4    Transitional zone: Mostly surgically resected.  Neurovascular bundle: Normal.  Seminal vesicles:  SV invasion: None  Adjacent Organ Involvement: No focal bladder wall thickening.  No rectal involvement.  Lymphadenopathy: None    On 11/22/19, pt underwent perineal biopsy by IR for CT guided needle bx for the right posterolateral prostate lesion;  Elevated prostate specific antigen (PSA)  Pathology:  BIOPSY OF PROSTATE BED:  FIBROFATTY TISSUE WITH NO NEOPLASIA IDENTIFIED    Pt had his PSA repeated and is now elevated to 7.9.  He is anxious about possible prostate cancer and would like to to something about it.    Past Medical History:   Diagnosis Date    Basal cell carcinoma     BPH (benign prostatic hyperplasia)     s/p TURP    Carotid stenosis     Chronic kidney disease     Claudication     Coronary artery disease     DDD (degenerative disc disease) 10/21/2013    Diabetes mellitus with renal complications     Disc disease,  degenerative, cervical     Encounter for blood transfusion     GERD (gastroesophageal reflux disease)     Gout, chronic     History of ulcerative colitis     s/p colectomy and ileostomy    HLD (hyperlipidemia)     HTN (hypertension)     Ileostomy in place 1982    RBBB     Squamous cell carcinoma 03/08/2018    Left superior helix near insertion    Squamous cell carcinoma 04/12/2018    Left forearm x 5    Ventricular tachycardia      Past Surgical History:   Procedure Laterality Date    cardiac stents      CATARACT EXTRACTION Bilateral     CERVICAL FUSION      colectomy and ileostomy  1985    TRANSURETHRAL RESECTION OF PROSTATE (TURP) WITHOUT USE OF LASER N/A 1/23/2019    Procedure: TURP, WITHOUT USING LASER BIPOLAR;  Surgeon: Catarino Mota MD;  Location: [facility] OR [address] [place] [address];  Service: Urology;  Laterality: N/A;  1.5 HOURS     Social History     Tobacco Use    Smoking status: Never Smoker    Smokeless tobacco: Never Used   Substance Use Topics    Alcohol use: No    Drug use: No     Family History   Problem Relation Age of Onset    Cancer Father     Diabetes Father     Dementia Mother     Melanoma Neg Hx     Hypertension Neg Hx     Arthritis Neg Hx      Allergy:  No Known Allergies  Outpatient Encounter Medications as of 5/19/2020   Medication Sig Dispense Refill    ACCU-CHEK PAUL CONTROL SOLN Soln 1 drop by NOT APPLICABLE route once daily.      ACCU-CHEK SOFTCLIX LANCETS Misc 1 lancet by NOT APPLICABLE route once daily.      allopurinol (ZYLOPRIM) 300 MG tablet Take 1.5 tablets (450 mg total) by mouth once daily. 135 tablet 3    amLODIPine (NORVASC) 2.5 MG tablet Take 2.5 tablets by mouth once daily.      aspirin (ECOTRIN) 81 MG EC tablet Take 81 mg by mouth once daily.        BD ALCOHOL SWABS PadM       blood sugar diagnostic (ONE TOUCH ULTRA TEST) Str USE ONE STRIP TO CHECK GLUCOSE EVERY  each 11    brimonidine 0.2% (ALPHAGAN) 0.2 % Drop INSTILL 1 DROP INTO EACH EYE TWICE  DAILY  4    carvediloL (COREG) 3.125 MG tablet TAKE 1 TABLET BY MOUTH IN THE MORNING AND TWO TABLETS BY MOUTH AT BEDTIME 90 tablet 3    cloNIDine (CATAPRES) 0.1 MG tablet Take 0.5 mg by mouth once daily.      clopidogrel (PLAVIX) 75 mg tablet once daily.       ergocalciferol, vitamin D2, (VITAMIN D ORAL) Take by mouth.      gabapentin (NEURONTIN) 600 MG tablet Take 1 tablet (600 mg total) by mouth once daily.      glimepiride (AMARYL) 4 MG tablet Take 1 tablet (4 mg total) by mouth 2 (two) times daily with meals. 180 tablet 3    lisinopril 10 MG tablet       HYDROcodone-acetaminophen (NORCO) 5-325 mg per tablet Take 1 tablet by mouth every 6 (six) hours as needed. (Patient not taking: Reported on 5/19/2020) 10 tablet 0    levocetirizine (XYZAL) 5 MG tablet Take 1 tablet (5 mg total) by mouth every evening. For nasal drip congestion (Patient not taking: Reported on 2/19/2020) 30 tablet 1    linaGLIPtin (TRADJENTA) 5 mg Tab tablet Take 1 tablet (5 mg total) by mouth once daily. 90 tablet 3    loperamide (IMODIUM A-D) 2 mg Tab Take 2 mg by mouth once daily.      magnesium oxide (MAG-OX) 400 mg (241.3 mg magnesium) tablet Take 400 mg by mouth once daily.      omeprazole (PRILOSEC) 40 MG capsule Take 1 capsule (40 mg total) by mouth once daily. (Patient not taking: Reported on 5/19/2020) 90 capsule 3    pravastatin (PRAVACHOL) 80 MG tablet Take 80 mg by mouth every evening.      sodium bicarbonate 650 MG tablet Take 2 tablets (1,300 mg total) by mouth 4 (four) times daily. (Patient not taking: Reported on 2/19/2020) 720 tablet 3     Facility-Administered Encounter Medications as of 5/19/2020   Medication Dose Route Frequency Provider Last Rate Last Dose    leuprolide (6 month) injection 45 mg  45 mg Intramuscular Q6 Months Catarino Mota MD        leuprolide (6 month) injection 45 mg  45 mg Intramuscular Q6 Months Catarino Mota MD   45 mg at 05/19/20 1112     Review of Systems   General ROS: GENERAL:   No weight gain or loss  SKIN:  No rashes or lacerations  HEAD:  No headaches  EYES:  No exophthalmos, jaundice or blindness  EARS:  No dizziness, tinnitus or hearing loss  NOSE:  No changes in smell  MOUTH & THROAT:  No dyskinetic movements or obvious goiter  CHEST:  No shortness of breath, hyperventilation or cough  CARDIOVASCULAR:  No tachycardia or chest pain  ABDOMEN:  No nausea, vomiting, pain, constipation or diarrhea  URINARY:  No frequency, dysuria or sexual dysfunction  ENDOCRINE:  No polydipsia, polyuria  MUSCULOSKELETAL:  No pain or stiffness of the joints  NEUROLOGIC:  No weakness, sensory changes, seizures, confusion, memory loss, tremor or other abnormal movements  Physical Exam     Vitals:    05/19/20 1058   BP: 137/81   Pulse: 86         LABS:  Lab Results   Component Value Date    PSA 7.9 (H) 04/20/2020    PSA 2.1 09/15/2014    PSA 2.16 05/13/2013    PSADIAG 6.3 (H) 08/16/2019    PSADIAG 4.1 (H) 11/21/2018    PSADIAG 3.3 06/15/2018     Lab Results   Component Value Date    CREATININE 1.4 04/20/2020    CREATININE 1.6 (H) 02/14/2020    CREATININE 1.7 (H) 02/10/2020     MRI of prostate 12/9/18  Redemonstration of a focal lesion along the posterior margin of the TURP resection cavity concerning for prostatic cancer.  Lesion is grossly unchanged from examination dated 06/19/2018.    Overall Assessment:    PIRADS 4 (clinically significant cancer is likely to be present)    Number of targets created for potential MR/US fusion biopsy    Residual prostatic tissue: 1    TURP Pathology 1/23/19  SPECIMEN  1) Right prostate chips.  FINAL PATHOLOGIC DIAGNOSIS  Right prostate gland, transurethral resection:  - Benign prostatic tissue with nodular stromal hyperplasia  - Negative for malignancy    Assessment and Plan:  Diagnoses and all orders for this visit:    Elevated PSA  -     Prostate Specific Antigen, Diagnostic; Future    so far we were not able to make a diagnosis of prostate cancer related to his elevated  PSA.  However, MRI on 8/20/19 showed PIRAD 4 lesion at the right posterior peripheral area.  No extracapsular disease noted.  He is concerned about rising PSA and would like to hernandez something about it.  I explained that even when a patient is diagnosed with prostate cancer, we may sometimes watch it without therapy for low grade / low risk cancer.  However, he is very nervous and anxious about his possible prostate cancer based on rising PSA.  Thus we discussed potential treatment.  I recommended ADT usign Lupron injection.  Potential risks and benefits discussed.  45 mg Lupron injection given by my nurse Nilam Payne.  Potential side effects including, but not limited to, lack of libido, ED, hot flashes, increased risk of cardiovascular event, osteoporosis explained.  He understands and would like to receive a lupron injection.    I spent 15 minutes with the patient of which more than half was spent in direct consultation with the patient in regards to our treatment and plan.      Follow-up:  Follow up in about 3 months (around 8/19/2020) for PSA.    This service was not originating from a related E/M service provided within the previous 7 days nor will  to an E/M service or procedure within the next 24 hours or my soonest available appointment.  Prevailing standard of care was able to be met in this audio-only visit.

## 2020-05-19 NOTE — LETTER
May 19, 2020      Poli Gonzalez MD  [address] [place] [address]           [facility] - Urology 4th Floor  [address]  [place] [address]  Phone: [phone]          Patient: Jarrett Charlton Jr.   MR Number: [id number]   YOB: 1950   Date of Visit: 5/19/2020       Dear Dr. Poli Gonzalez:    Thank you for referring Jarrett Charlton to me for evaluation. Attached you will find relevant portions of my assessment and plan of care.    If you have questions, please do not hesitate to call me. I look forward to following Jarrett Charlton along with you.    Sincerely,    Catarino Mota MD    Enclosure  CC:  No Recipients    If you would like to receive this communication electronically, please contact [email or web address] or [phone] to request more information on [employer] access.    For providers and/or their staff who would like to refer a patient to [facility], please contact us through our one-stop-shop provider referral line, [facility], at [phone].    If you feel you have received this communication in error or would no longer like to receive these types of communications, please e-mail [email or web address]

## 2020-05-19 NOTE — PATIENT INSTRUCTIONS
Lab Results   Component Value Date    PSA 7.9 (H) 04/20/2020    PSA 2.1 09/15/2014    PSA 2.16 05/13/2013    PSADIAG 6.3 (H) 08/16/2019    PSADIAG 4.1 (H) 11/21/2018    PSADIAG 3.3 06/15/2018

## 2020-05-21 ENCOUNTER — OFFICE VISIT (OUTPATIENT)
Dept: PRIMARY CARE CLINIC | Facility: CLINIC | Age: 70
End: 2020-05-21
Payer: MEDICARE

## 2020-05-21 ENCOUNTER — CLINICAL SUPPORT (OUTPATIENT)
Dept: PRIMARY CARE CLINIC | Facility: CLINIC | Age: 70
End: 2020-05-21
Payer: MEDICARE

## 2020-05-21 VITALS
BODY MASS INDEX: 28.49 KG/M2 | RESPIRATION RATE: 18 BRPM | HEART RATE: 88 BPM | WEIGHT: 222 LBS | OXYGEN SATURATION: 97 % | SYSTOLIC BLOOD PRESSURE: 140 MMHG | HEIGHT: 74 IN | DIASTOLIC BLOOD PRESSURE: 70 MMHG | TEMPERATURE: 98 F

## 2020-05-21 DIAGNOSIS — Z48.02 ENCOUNTER FOR REMOVAL OF SUTURES: Primary | ICD-10-CM

## 2020-05-21 DIAGNOSIS — Z20.822 EXPOSURE TO COVID-19 VIRUS: ICD-10-CM

## 2020-05-21 PROCEDURE — 1101F PR PT FALLS ASSESS DOC 0-1 FALLS W/OUT INJ PAST YR: ICD-10-PCS | Mod: CPTII,S$GLB,, | Performed by: INTERNAL MEDICINE

## 2020-05-21 PROCEDURE — 3078F DIAST BP <80 MM HG: CPT | Mod: CPTII,S$GLB,, | Performed by: INTERNAL MEDICINE

## 2020-05-21 PROCEDURE — 1101F PT FALLS ASSESS-DOCD LE1/YR: CPT | Mod: CPTII,S$GLB,, | Performed by: INTERNAL MEDICINE

## 2020-05-21 PROCEDURE — 1125F PR PAIN SEVERITY QUANTIFIED, PAIN PRESENT: ICD-10-PCS | Mod: S$GLB,,, | Performed by: INTERNAL MEDICINE

## 2020-05-21 PROCEDURE — 99213 PR OFFICE/OUTPT VISIT, EST, LEVL III, 20-29 MIN: ICD-10-PCS | Mod: S$GLB,,, | Performed by: INTERNAL MEDICINE

## 2020-05-21 PROCEDURE — 3078F PR MOST RECENT DIASTOLIC BLOOD PRESSURE < 80 MM HG: ICD-10-PCS | Mod: CPTII,S$GLB,, | Performed by: INTERNAL MEDICINE

## 2020-05-21 PROCEDURE — 3077F SYST BP >= 140 MM HG: CPT | Mod: CPTII,S$GLB,, | Performed by: INTERNAL MEDICINE

## 2020-05-21 PROCEDURE — 99999 PR PBB SHADOW E&M-EST. PATIENT-LVL III: ICD-10-PCS | Mod: PBBFAC,,, | Performed by: INTERNAL MEDICINE

## 2020-05-21 PROCEDURE — 1125F AMNT PAIN NOTED PAIN PRSNT: CPT | Mod: S$GLB,,, | Performed by: INTERNAL MEDICINE

## 2020-05-21 PROCEDURE — 36415 PR COLLECTION VENOUS BLOOD,VENIPUNCTURE: ICD-10-PCS | Mod: S$GLB,,, | Performed by: INTERNAL MEDICINE

## 2020-05-21 PROCEDURE — 1159F PR MEDICATION LIST DOCUMENTED IN MEDICAL RECORD: ICD-10-PCS | Mod: S$GLB,,, | Performed by: INTERNAL MEDICINE

## 2020-05-21 PROCEDURE — 1159F MED LIST DOCD IN RCRD: CPT | Mod: S$GLB,,, | Performed by: INTERNAL MEDICINE

## 2020-05-21 PROCEDURE — 86769 SARS-COV-2 COVID-19 ANTIBODY: CPT

## 2020-05-21 PROCEDURE — 36415 COLL VENOUS BLD VENIPUNCTURE: CPT | Mod: S$GLB,,, | Performed by: INTERNAL MEDICINE

## 2020-05-21 PROCEDURE — 3077F PR MOST RECENT SYSTOLIC BLOOD PRESSURE >= 140 MM HG: ICD-10-PCS | Mod: CPTII,S$GLB,, | Performed by: INTERNAL MEDICINE

## 2020-05-21 PROCEDURE — 99999 PR PBB SHADOW E&M-EST. PATIENT-LVL III: CPT | Mod: PBBFAC,,, | Performed by: INTERNAL MEDICINE

## 2020-05-21 PROCEDURE — 99213 OFFICE O/P EST LOW 20 MIN: CPT | Mod: S$GLB,,, | Performed by: INTERNAL MEDICINE

## 2020-05-21 NOTE — PROGRESS NOTES
Subjective:       Patient ID: Jarrett Charlton Jr. is a 70 y.o. male.    Chief Complaint: Suture / Staple Removal    HPI patient is here today for suture removal of the left index finger patient has sutures in the ER a days ago after hit the tip of the left index finger accidentally with a hammer patient states he had profuse bleeding but control after suture the wound has been healing cover with 0ld blood no erythema no other injury patient also request to be tested for COVID-19 antibody since h has a take care of his wife who required maximum assistant with daily living activity due to multiple serious medical illnesses patient mostly stay home but the has to go out to get foot grocery  Review of Systems    Objective:      Physical Exam   Constitutional: He is oriented to person, place, and time. He appears well-developed and well-nourished. No distress.   HENT:   Head: Normocephalic and atraumatic.   Right Ear: External ear normal.   Left Ear: External ear normal.   Nose: Nose normal.   Mouth/Throat: Oropharynx is clear and moist. No oropharyngeal exudate.   Eyes: Pupils are equal, round, and reactive to light. Conjunctivae and EOM are normal. Right eye exhibits no discharge. Left eye exhibits no discharge.   Neck: Normal range of motion. Neck supple. No thyromegaly present.   Cardiovascular: Normal rate, regular rhythm, normal heart sounds and intact distal pulses. Exam reveals no gallop and no friction rub.   No murmur heard.  Pulmonary/Chest: Effort normal and breath sounds normal. No respiratory distress. He has no wheezes. He has no rales. He exhibits no tenderness.   Abdominal: Soft. Bowel sounds are normal. He exhibits no distension. There is no tenderness. There is no rebound and no guarding.   Musculoskeletal: Normal range of motion. He exhibits no edema, tenderness or deformity.   Lymphadenopathy:     He has no cervical adenopathy.   Neurological: He is alert and oriented to person, place, and time.    Skin: Skin is warm and dry. Capillary refill takes less than 2 seconds. No rash noted. No erythema.   Left index finger tip with likes aeration to sutures wound is healing cover with also coagulated blood   Psychiatric: He has a normal mood and affect. Judgment and thought content normal.   Nursing note and vitals reviewed.      Assessment:       1. Encounter for removal of sutures    2. Exposure to Covid-19 Virus        Plan:       Encounter for removal of sutures  Comments:  Remove 2 sutures at the tip of the left index finger without any complication    Exposure to Covid-19 Virus  -     COVID-19 (SARS CoV-2) IgG Antibody; Future; Expected date: 05/21/2020

## 2020-05-22 LAB — SARS-COV-2 IGG SERPLBLD QL IA.RAPID: NEGATIVE

## 2020-05-22 NOTE — TELEPHONE ENCOUNTER
----- Message from Ksenia Ashley sent at 5/22/2020  3:59 PM CDT -----  Contact: Self [phone]  Requesting an RX refill or new RX.  Is this a refill or new RX:  rgabapentin (NEURONTIN) 600 MG tabletefill  RX name and strength:   Directions (copy/paste from chart):    Is this a 30 day or 90 day RX: 90 day    Local pharmacy or mail order pharmacy:  Mail order  Pharmacy name and phone # (copy/paste from chart):  [facility] - [place], [place] - [address] [phone] (Phone)  [phone] (Fax)   Comments:  Patient is out of medications

## 2020-05-23 RX ORDER — GABAPENTIN 600 MG/1
600 TABLET ORAL DAILY
Qty: 90 TABLET | Refills: 0
Start: 2020-05-23 | End: 2020-06-01 | Stop reason: SDUPTHER

## 2020-06-01 DIAGNOSIS — E11.42 DIABETIC POLYNEUROPATHY ASSOCIATED WITH TYPE 2 DIABETES MELLITUS: Primary | ICD-10-CM

## 2020-06-01 DIAGNOSIS — E11.9 TYPE 2 DIABETES MELLITUS WITHOUT COMPLICATION, WITHOUT LONG-TERM CURRENT USE OF INSULIN: ICD-10-CM

## 2020-06-01 RX ORDER — GABAPENTIN 600 MG/1
600 TABLET ORAL DAILY
Qty: 90 TABLET | Refills: 3 | Status: SHIPPED | OUTPATIENT
Start: 2020-06-01 | End: 2020-06-03 | Stop reason: SDUPTHER

## 2020-06-01 NOTE — TELEPHONE ENCOUNTER
----- Message from Jess Silverio sent at 6/1/2020  4:14 PM CDT -----  Contact: Patient  Type: Needs Medical Advice    Who Called:  Jarrett patient  Symptoms (please be specific):  N/A  How long has patient had these symptoms:  N/A  Pharmacy name and phone #:    [facility] - [facility] [address]  [address] [address]  Phone: [phone] Fax: [phone]  Best Call Back Number: [phone]  Additional Information: Calling to inquire about Rx gabapentin (NEURONTIN) 600 MG tablet. Please call him. Thanks.

## 2020-06-03 ENCOUNTER — TELEPHONE (OUTPATIENT)
Dept: PRIMARY CARE CLINIC | Facility: CLINIC | Age: 70
End: 2020-06-03

## 2020-06-03 DIAGNOSIS — E11.42 DIABETIC POLYNEUROPATHY ASSOCIATED WITH TYPE 2 DIABETES MELLITUS: ICD-10-CM

## 2020-06-03 DIAGNOSIS — J92.9 PLEURAL PLAQUE: Primary | ICD-10-CM

## 2020-06-03 DIAGNOSIS — E11.9 TYPE 2 DIABETES MELLITUS WITHOUT COMPLICATION, WITHOUT LONG-TERM CURRENT USE OF INSULIN: ICD-10-CM

## 2020-06-03 RX ORDER — GABAPENTIN 600 MG/1
600 TABLET ORAL 2 TIMES DAILY
Qty: 180 TABLET | Refills: 3 | Status: SHIPPED | OUTPATIENT
Start: 2020-06-03 | End: 2021-04-07 | Stop reason: CLARIF

## 2020-06-03 NOTE — TELEPHONE ENCOUNTER
Gabapentin was sent with wrong directions patient takes it bid . rx corrected and pended please sign and send to [facility] mail pharmacy.

## 2020-06-03 NOTE — TELEPHONE ENCOUNTER
----- Message from Jess Silverio sent at 6/3/2020  9:34 AM CDT -----  Contact: Patient  Type: Needs Medical Advice    Who Called:  Jarrett patient  Symptoms (please be specific):  N/A  How long has patient had these symptoms:  N/A  Pharmacy name and phone #:    [facility] - [place], [place] - [address]  [address] [address]  Phone: [phone] Fax: [phone]  Best Call Back Number: [phone]  Additional Information: Calling because Rx gabapentin (NEURONTIN) 600 MG tablet was sent to [facility] mail order, but the instructions are wrong he takes one tablet twice a day. Please advise. Thanks.

## 2020-06-03 NOTE — TELEPHONE ENCOUNTER
----- Message from Luann Julio sent at 6/3/2020  4:44 PM CDT -----  Contact: [phone]  Patient would like the nurse to call him, He doesn't understand why he needs to consult  with  Pulmonology.

## 2020-06-04 NOTE — TELEPHONE ENCOUNTER
There is calcified pleural plague on the left side chest we can monitor it with repeat CXR CT scan in 3 mos

## 2020-06-08 ENCOUNTER — PATIENT OUTREACH (OUTPATIENT)
Dept: ADMINISTRATIVE | Facility: OTHER | Age: 70
End: 2020-06-08

## 2020-06-10 ENCOUNTER — OFFICE VISIT (OUTPATIENT)
Dept: PULMONOLOGY | Facility: CLINIC | Age: 70
End: 2020-06-10
Payer: MEDICARE

## 2020-06-10 VITALS
WEIGHT: 220 LBS | HEART RATE: 84 BPM | OXYGEN SATURATION: 98 % | HEIGHT: 74 IN | DIASTOLIC BLOOD PRESSURE: 83 MMHG | BODY MASS INDEX: 28.23 KG/M2 | SYSTOLIC BLOOD PRESSURE: 137 MMHG

## 2020-06-10 DIAGNOSIS — R06.09 DOE (DYSPNEA ON EXERTION): ICD-10-CM

## 2020-06-10 DIAGNOSIS — J92.9 PLEURAL PLAQUE: Primary | ICD-10-CM

## 2020-06-10 DIAGNOSIS — I70.0 AORTIC ATHEROSCLEROSIS: ICD-10-CM

## 2020-06-10 PROCEDURE — 99214 OFFICE O/P EST MOD 30 MIN: CPT | Mod: S$GLB,,, | Performed by: NURSE PRACTITIONER

## 2020-06-10 PROCEDURE — 99214 PR OFFICE/OUTPT VISIT, EST, LEVL IV, 30-39 MIN: ICD-10-PCS | Mod: S$GLB,,, | Performed by: NURSE PRACTITIONER

## 2020-06-10 PROCEDURE — 99999 PR PBB SHADOW E&M-EST. PATIENT-LVL III: CPT | Mod: PBBFAC,,, | Performed by: NURSE PRACTITIONER

## 2020-06-10 PROCEDURE — 99999 PR PBB SHADOW E&M-EST. PATIENT-LVL III: ICD-10-PCS | Mod: PBBFAC,,, | Performed by: NURSE PRACTITIONER

## 2020-06-10 NOTE — ASSESSMENT & PLAN NOTE
Pleural plaque noted on CT of Chest 6/1/2020.   Reviewed imaging with Dr. Kasper. Will follow with pleural plaques with annual CXR and symptomatically.

## 2020-06-10 NOTE — PROGRESS NOTES
Subjective:       Patient ID: Jarrett Charlton Jr. is a 70 y.o. male.    Chief Complaint: Pleural Plaque  HPI     Mr. Charlton is a 69 y/o M presenting to pulmonary clinic for pleural plaque.    Had abnormal CXR - which recommended a CT follow up . On 6/1/2020, CT of chest  showed pleural plaques.   He reports working in [employer] for appx 6 months in 1972. 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 Reports exposure to benzine.  He denies weight loss, hemoptysis, cough, sputum production, night sweats, fever or chills. Can experience shortness of breath with walking at times. He endorses occasional bilateral lower leg swelling. Hx of heart disease and follows with Cardiology. Denies smoking hx.       Unfortunately, he reports wife is at  [facility] ICU  after suffering cardiac arrest.      Additional Pulmonary History:   Exposure to Animals/Pets: Denies  Travel History: Denies  History of exposures to TB: Denies  Family History of Lung Cancer: Questionable lung CA with father- worked at [employer].    Childhood history of Lung Disease: Denies  Social History     Tobacco Use   Smoking Status Never Smoker   Smokeless Tobacco Never Used      Review of Systems   Constitutional: Negative for fever, chills, weight loss and night sweats.   HENT: Negative for postnasal drip, rhinorrhea, trouble swallowing and congestion.    Respiratory: Positive for shortness of breath. Negative for cough, hemoptysis, sputum production, choking, chest tightness, wheezing, dyspnea on extertion and use of rescue inhaler.    Cardiovascular: Negative for chest pain and leg swelling.   Musculoskeletal: Negative for joint swelling.   Skin: Negative for rash.   Gastrointestinal: Negative for acid reflux.   Neurological: Negative for dizziness and light-headedness.   Hematological: Negative for adenopathy.   Psychiatric/Behavioral: Negative for sleep disturbance.       Objective:      Vitals:     "06/10/20 1036   BP: 137/83   BP Location: Right arm   Patient Position: Sitting   BP Method: Large (Automatic)   Pulse: 84   SpO2: 98%   Weight: 99.8 kg (220 lb)   Height: 6' 2" (1.88 m)      Physical Exam   Constitutional: He is oriented to person, place, and time. He appears well-developed and well-nourished. No distress.   HENT:   Head: Normocephalic.   Neck: Normal range of motion. Neck supple.   Cardiovascular: Normal rate, regular rhythm and normal heart sounds.   Pulmonary/Chest: Normal expansion, effort normal and breath sounds normal. No respiratory distress. He has no wheezes. He has no rhonchi.   Abdominal: Soft. Bowel sounds are normal. There is no tenderness.   Musculoskeletal: Normal range of motion. He exhibits no edema.   Lymphadenopathy: No supraclavicular adenopathy is present.     He has no cervical adenopathy.   Neurological: He is alert and oriented to person, place, and time. Gait normal.   Skin: Skin is warm and dry. Nails show no clubbing.   Psychiatric: He has a normal mood and affect. His behavior is normal.   Vitals reviewed.    Personal Diagnostic Review    CT Chest Without Contrast 6/1/2020-   FINDINGS:  Structures at the base of the neck demonstrate no significant abnormalities.  There is mild aortic atherosclerosis.  There is extensive coronary atherosclerosis with coronary stent.  Heart is not enlarged.  No significant pericardial fluid.  No abnormal axillary or mediastinal lymph node enlargement.    Trachea and central airways are patent.  Lungs are symmetrically expanded and show no evidence of significant focal consolidation, large effusion or pneumothorax.  Minimal bandlike densities at the lung bases suggests scarring.  There are calcified pleural plaques on the left accounting for findings on prior chest radiograph.    Visualized portion of the upper abdomen shows no acute abnormalities.  There is a soft tissue density adjacent to the spleen possibly a splenule.  There are " surgical clips in the anterior abdomen mesentery.  Bones show degenerative changes.  No acute bony abnormalities.  Small sclerotic focus in the L2 vertebral body likely a small bone island.  There is mild gynecomastia.    Impression: No acute CT findings in the chest.  Abnormalities on prior chest radiograph correspond to calcified pleural plaques.        Assessment:       1. Pleural plaque    2. Aortic atherosclerosis    3. WILSON (dyspnea on exertion)        Outpatient Encounter Medications as of 6/10/2020   Medication Sig Dispense Refill    ACCU-CHEK PAUL CONTROL SOLN Soln 1 drop by NOT APPLICABLE route once daily.      ACCU-CHEK SOFTCLIX LANCETS Misc 1 lancet by NOT APPLICABLE route once daily.      allopurinol (ZYLOPRIM) 300 MG tablet Take 1.5 tablets (450 mg total) by mouth once daily. 135 tablet 3    amLODIPine (NORVASC) 2.5 MG tablet Take 2.5 tablets by mouth once daily.      aspirin (ECOTRIN) 81 MG EC tablet Take 81 mg by mouth once daily.        BD ALCOHOL SWABS PadM       blood sugar diagnostic (ONE TOUCH ULTRA TEST) Strp USE ONE STRIP TO CHECK GLUCOSE EVERY  each 11    brimonidine 0.2% (ALPHAGAN) 0.2 % Drop INSTILL 1 DROP INTO EACH EYE TWICE DAILY  4    carvediloL (COREG) 3.125 MG tablet TAKE 1 TABLET BY MOUTH IN THE MORNING AND TWO TABLETS BY MOUTH AT BEDTIME 90 tablet 3    cloNIDine (CATAPRES) 0.1 MG tablet Take 0.5 mg by mouth once daily.      clopidogrel (PLAVIX) 75 mg tablet once daily.       ergocalciferol, vitamin D2, (VITAMIN D ORAL) Take by mouth.      gabapentin (NEURONTIN) 600 MG tablet Take 1 tablet (600 mg total) by mouth 2 (two) times daily. 180 tablet 3    glimepiride (AMARYL) 4 MG tablet Take 1 tablet (4 mg total) by mouth 2 (two) times daily with meals. 180 tablet 3    linaGLIPtin (TRADJENTA) 5 mg Tab tablet Take 1 tablet (5 mg total) by mouth once daily. 90 tablet 3    loperamide (IMODIUM A-D) 2 mg Tab Take 2 mg by mouth once daily.      omeprazole (PRILOSEC) 40 MG  capsule Take 1 capsule (40 mg total) by mouth once daily. 90 capsule 3    HYDROcodone-acetaminophen (NORCO) 5-325 mg per tablet Take 1 tablet by mouth every 6 (six) hours as needed. (Patient not taking: Reported on 6/10/2020) 10 tablet 0    lisinopril 10 MG tablet        Facility-Administered Encounter Medications as of 6/10/2020   Medication Dose Route Frequency Provider Last Rate Last Dose    leuprolide (6 month) injection 45 mg  45 mg Intramuscular Q6 Months Catarino Mota MD        leuprolide (6 month) injection 45 mg  45 mg Intramuscular Q6 Months Catarino Mota MD   45 mg at 05/19/20 1112     Orders Placed This Encounter   Procedures    X-Ray Chest PA And Lateral     Standing Status:   Future     Standing Expiration Date:   6/10/2021     Order Specific Question:   May the Radiologist modify the order per protocol to meet the clinical needs of the patient?     Answer:   Yes       Plan:         Problem List Items Addressed This Visit        Pulmonary    Pleural plaque - Primary    Current Assessment & Plan     Pleural plaque noted on CT of Chest 6/1/2020.   Reviewed imaging with Dr. Kasper. Will follow with pleural plaques with annual CXR and symptomatically.          Relevant Orders    X-Ray Chest PA And Lateral       Cardiac/Vascular    Aortic atherosclerosis    Overview     Noted on CT of chest 6-1-2020.   Recommended to follow up with cardiology or PCP.                 Other    WILSON (dyspnea on exertion)    Overview     Patient reports experiencing shortness of breath with exertion. Has some occupational exposures. Denies smoking hx. CT of chest showed pleural plaques. Consider PFTs and 6 MWT. He concern at this time is caring for his wife at [facility] after suffering cardiac arrest.            Follow up in 6 months or sooner if needed.     This note is dictated on M*Modal word recognition program.  There are word recognition mistakes that are occasionally missed on review.

## 2020-06-10 NOTE — LETTER
June 11, 2020      Poli Gonzalez MD  [address] [place] [address]           [facility]  [address], [facility] [address]  [place] [address]  Phone: [phone]  Fax: [phone]          Patient: Jarrett Charlton Jr.   MR Number: [id number]   YOB: 1950   Date of Visit: 6/10/2020       Dear Dr. Poli Gonzalez:    Thank you for referring Jarrett Charlton to me for evaluation. Attached you will find relevant portions of my assessment and plan of care.    If you have questions, please do not hesitate to call me. I look forward to following Jarrett Charlton along with you.    Sincerely,    Analy Hammond NP    Enclosure  CC:  No Recipients    If you would like to receive this communication electronically, please contact [email or web address] or [phone] to request more information on [facility] access.    For providers and/or their staff who would like to refer a patient to [facility], please contact us through our one-stop-shop provider referral line, [facility] [place], at [phone].    If you feel you have received this communication in error or would no longer like to receive these types of communications, please e-mail [email or web address]

## 2020-06-17 DIAGNOSIS — E11.9 TYPE 2 DIABETES MELLITUS WITHOUT COMPLICATION, WITHOUT LONG-TERM CURRENT USE OF INSULIN: ICD-10-CM

## 2020-06-17 NOTE — TELEPHONE ENCOUNTER
----- Message from Annita Gordon sent at 6/17/2020  8:50 AM CDT -----  Regarding: refill  Contact: Patient [phone]  Requesting an RX refill or new RX.  Is this a refill or new RX:  refill  RX name and strength: linaGLIPtin (TRADJENTA) 5 mg Tab tablet  Directions (copy/paste from chart):  Take 1 tablet (5 mg total) by mouth once daily. - Oral  Is this a 30 day or 90 day RX:  90  Local pharmacy or mail order pharmacy:  mail  Pharmacy name and phone #[facility] - [place], [place] - [address] [phone] (Phone) [phone] (Fax)

## 2020-06-18 NOTE — TELEPHONE ENCOUNTER
Spoke with patient and let him know that medication had been called in to his pharmacy. Patient stated understanding

## 2020-06-29 ENCOUNTER — TELEPHONE (OUTPATIENT)
Dept: NEPHROLOGY | Facility: CLINIC | Age: 70
End: 2020-06-29

## 2020-06-29 NOTE — TELEPHONE ENCOUNTER
Spoke to pt and stated his wife is in hospice and wanted to know if he can take ativan and lexapro that was given to him by another doctor without doing harm to his kidneys /per Dr. Mukherjee the drugs are ok to take     ----- Message from Anup Mukherjee MD sent at 6/29/2020 12:44 PM CDT -----  Regarding: RE: Advice  Contact: pt  He can take them after dose adjustment according to GFR and it has to be done by the prescribe   ----- Message -----  From: Ivette Marroquin LPN  Sent: 6/29/2020  10:22 AM CDT  To: Anup Mukherjee MD  Subject: FW: Advice                                       Pt was is in hospice right now and cardiologist had given pt ativan and lexapro to take during this stressful time ,pt would like to know if these meds are ok to take in regards to his kidneys   ----- Message -----  From: Shae Rogers  Sent: 6/29/2020   8:33 AM CDT  To: Irina Carlton Staff  Subject: Advice                                           Reason: Pt have a medication question (dosage and usage)    Communication: [phone]

## 2020-07-23 ENCOUNTER — OFFICE VISIT (OUTPATIENT)
Dept: DERMATOLOGY | Facility: CLINIC | Age: 70
End: 2020-07-23
Payer: MEDICARE

## 2020-07-23 ENCOUNTER — OFFICE VISIT (OUTPATIENT)
Dept: RHEUMATOLOGY | Facility: CLINIC | Age: 70
End: 2020-07-23
Payer: MEDICARE

## 2020-07-23 VITALS
DIASTOLIC BLOOD PRESSURE: 86 MMHG | TEMPERATURE: 96 F | HEART RATE: 68 BPM | SYSTOLIC BLOOD PRESSURE: 137 MMHG | WEIGHT: 216 LBS | BODY MASS INDEX: 27.72 KG/M2 | HEIGHT: 74 IN

## 2020-07-23 DIAGNOSIS — L57.0 MULTIPLE ACTINIC KERATOSES: ICD-10-CM

## 2020-07-23 DIAGNOSIS — G63 POLYNEUROPATHY ASSOCIATED WITH UNDERLYING DISEASE: ICD-10-CM

## 2020-07-23 DIAGNOSIS — M15.9 GENERALIZED OSTEOARTHRITIS: Primary | ICD-10-CM

## 2020-07-23 DIAGNOSIS — M77.8 TENDINITIS OF THUMB: ICD-10-CM

## 2020-07-23 DIAGNOSIS — D48.5 NEOPLASM OF UNCERTAIN BEHAVIOR OF SKIN: Primary | ICD-10-CM

## 2020-07-23 DIAGNOSIS — Z86.007 HISTORY OF SQUAMOUS CELL CARCINOMA IN SITU (SCCIS) OF SKIN: ICD-10-CM

## 2020-07-23 DIAGNOSIS — M1A.09X1 IDIOPATHIC CHRONIC GOUT OF MULTIPLE SITES WITH TOPHUS: ICD-10-CM

## 2020-07-23 PROCEDURE — 1159F MED LIST DOCD IN RCRD: CPT | Mod: S$GLB,,, | Performed by: INTERNAL MEDICINE

## 2020-07-23 PROCEDURE — 99999 PR PBB SHADOW E&M-EST. PATIENT-LVL IV: CPT | Mod: PBBFAC,,, | Performed by: PATHOLOGY

## 2020-07-23 PROCEDURE — 11102 TANGNTL BX SKIN SINGLE LES: CPT | Mod: S$GLB,,, | Performed by: PATHOLOGY

## 2020-07-23 PROCEDURE — 3079F DIAST BP 80-89 MM HG: CPT | Mod: CPTII,S$GLB,, | Performed by: INTERNAL MEDICINE

## 2020-07-23 PROCEDURE — 1125F AMNT PAIN NOTED PAIN PRSNT: CPT | Mod: S$GLB,,, | Performed by: INTERNAL MEDICINE

## 2020-07-23 PROCEDURE — 88305 TISSUE EXAM BY PATHOLOGIST: CPT | Performed by: PATHOLOGY

## 2020-07-23 PROCEDURE — 99999 PR PBB SHADOW E&M-EST. PATIENT-LVL IV: ICD-10-PCS | Mod: PBBFAC,,, | Performed by: INTERNAL MEDICINE

## 2020-07-23 PROCEDURE — 3079F PR MOST RECENT DIASTOLIC BLOOD PRESSURE 80-89 MM HG: ICD-10-PCS | Mod: CPTII,S$GLB,, | Performed by: INTERNAL MEDICINE

## 2020-07-23 PROCEDURE — 3075F PR MOST RECENT SYSTOLIC BLOOD PRESS GE 130-139MM HG: ICD-10-PCS | Mod: CPTII,S$GLB,, | Performed by: INTERNAL MEDICINE

## 2020-07-23 PROCEDURE — 99213 OFFICE O/P EST LOW 20 MIN: CPT | Mod: 25,S$GLB,, | Performed by: PATHOLOGY

## 2020-07-23 PROCEDURE — 3008F PR BODY MASS INDEX (BMI) DOCUMENTED: ICD-10-PCS | Mod: CPTII,S$GLB,, | Performed by: INTERNAL MEDICINE

## 2020-07-23 PROCEDURE — 1101F PT FALLS ASSESS-DOCD LE1/YR: CPT | Mod: CPTII,S$GLB,, | Performed by: PATHOLOGY

## 2020-07-23 PROCEDURE — 11102 PR TANGENTIAL BIOPSY, SKIN, SINGLE LESION: ICD-10-PCS | Mod: S$GLB,,, | Performed by: PATHOLOGY

## 2020-07-23 PROCEDURE — 99999 PR PBB SHADOW E&M-EST. PATIENT-LVL IV: ICD-10-PCS | Mod: PBBFAC,,, | Performed by: PATHOLOGY

## 2020-07-23 PROCEDURE — 17000 PR DESTRUCTION(LASER SURGERY,CRYOSURGERY,CHEMOSURGERY),PREMALIGNANT LESIONS,FIRST LESION: ICD-10-PCS | Mod: 59,S$GLB,, | Performed by: PATHOLOGY

## 2020-07-23 PROCEDURE — 99213 OFFICE O/P EST LOW 20 MIN: CPT | Mod: S$GLB,,, | Performed by: INTERNAL MEDICINE

## 2020-07-23 PROCEDURE — 1126F AMNT PAIN NOTED NONE PRSNT: CPT | Mod: S$GLB,,, | Performed by: PATHOLOGY

## 2020-07-23 PROCEDURE — 1101F PR PT FALLS ASSESS DOC 0-1 FALLS W/OUT INJ PAST YR: ICD-10-PCS | Mod: CPTII,S$GLB,, | Performed by: INTERNAL MEDICINE

## 2020-07-23 PROCEDURE — 17003 DESTRUCT PREMALG LES 2-14: CPT | Mod: S$GLB,,, | Performed by: PATHOLOGY

## 2020-07-23 PROCEDURE — 88305 TISSUE EXAM BY PATHOLOGIST: ICD-10-PCS | Mod: 26,,, | Performed by: PATHOLOGY

## 2020-07-23 PROCEDURE — 1126F PR PAIN SEVERITY QUANTIFIED, NO PAIN PRESENT: ICD-10-PCS | Mod: S$GLB,,, | Performed by: PATHOLOGY

## 2020-07-23 PROCEDURE — 11103 PR TANGENTIAL BIOPSY, SKIN, EA ADDTL LESION: ICD-10-PCS | Mod: S$GLB,,, | Performed by: PATHOLOGY

## 2020-07-23 PROCEDURE — 1159F PR MEDICATION LIST DOCUMENTED IN MEDICAL RECORD: ICD-10-PCS | Mod: S$GLB,,, | Performed by: INTERNAL MEDICINE

## 2020-07-23 PROCEDURE — 17000 DESTRUCT PREMALG LESION: CPT | Mod: 59,S$GLB,, | Performed by: PATHOLOGY

## 2020-07-23 PROCEDURE — 17003 DESTRUCTION, PREMALIGNANT LESIONS; SECOND THROUGH 14 LESIONS: ICD-10-PCS | Mod: S$GLB,,, | Performed by: PATHOLOGY

## 2020-07-23 PROCEDURE — 3008F BODY MASS INDEX DOCD: CPT | Mod: CPTII,S$GLB,, | Performed by: INTERNAL MEDICINE

## 2020-07-23 PROCEDURE — 99213 PR OFFICE/OUTPT VISIT, EST, LEVL III, 20-29 MIN: ICD-10-PCS | Mod: S$GLB,,, | Performed by: INTERNAL MEDICINE

## 2020-07-23 PROCEDURE — 1159F MED LIST DOCD IN RCRD: CPT | Mod: S$GLB,,, | Performed by: PATHOLOGY

## 2020-07-23 PROCEDURE — 1159F PR MEDICATION LIST DOCUMENTED IN MEDICAL RECORD: ICD-10-PCS | Mod: S$GLB,,, | Performed by: PATHOLOGY

## 2020-07-23 PROCEDURE — 11103 TANGNTL BX SKIN EA SEP/ADDL: CPT | Mod: S$GLB,,, | Performed by: PATHOLOGY

## 2020-07-23 PROCEDURE — 99213 PR OFFICE/OUTPT VISIT, EST, LEVL III, 20-29 MIN: ICD-10-PCS | Mod: 25,S$GLB,, | Performed by: PATHOLOGY

## 2020-07-23 PROCEDURE — 3075F SYST BP GE 130 - 139MM HG: CPT | Mod: CPTII,S$GLB,, | Performed by: INTERNAL MEDICINE

## 2020-07-23 PROCEDURE — 1101F PR PT FALLS ASSESS DOC 0-1 FALLS W/OUT INJ PAST YR: ICD-10-PCS | Mod: CPTII,S$GLB,, | Performed by: PATHOLOGY

## 2020-07-23 PROCEDURE — 1101F PT FALLS ASSESS-DOCD LE1/YR: CPT | Mod: CPTII,S$GLB,, | Performed by: INTERNAL MEDICINE

## 2020-07-23 PROCEDURE — 88305 TISSUE EXAM BY PATHOLOGIST: CPT | Mod: 26,,, | Performed by: PATHOLOGY

## 2020-07-23 PROCEDURE — 99999 PR PBB SHADOW E&M-EST. PATIENT-LVL IV: CPT | Mod: PBBFAC,,, | Performed by: INTERNAL MEDICINE

## 2020-07-23 PROCEDURE — 1125F PR PAIN SEVERITY QUANTIFIED, PAIN PRESENT: ICD-10-PCS | Mod: S$GLB,,, | Performed by: INTERNAL MEDICINE

## 2020-07-23 RX ORDER — FLUOROURACIL 0.04 G/G
1 CREAM TOPICAL DAILY
Qty: 40 G | Refills: 3 | Status: SHIPPED | OUTPATIENT
Start: 2020-07-23 | End: 2020-08-13

## 2020-07-23 RX ORDER — CARVEDILOL 12.5 MG/1
12.5 TABLET ORAL 2 TIMES DAILY
Status: ON HOLD | COMMUNITY
Start: 2020-06-26 | End: 2021-04-05 | Stop reason: HOSPADM

## 2020-07-23 RX ORDER — ALLOPURINOL 300 MG/1
450 TABLET ORAL DAILY
Qty: 135 TABLET | Refills: 3 | Status: SHIPPED | OUTPATIENT
Start: 2020-07-23 | End: 2021-04-07 | Stop reason: CLARIF

## 2020-07-23 RX ORDER — PANTOPRAZOLE SODIUM 40 MG/1
TABLET, DELAYED RELEASE ORAL
COMMUNITY
Start: 2020-06-24 | End: 2020-08-11

## 2020-07-23 ASSESSMENT — ROUTINE ASSESSMENT OF PATIENT INDEX DATA (RAPID3)
MDHAQ FUNCTION SCORE: 0
FATIGUE SCORE: 0
PSYCHOLOGICAL DISTRESS SCORE: 0
PAIN SCORE: 0
PATIENT GLOBAL ASSESSMENT SCORE: 0
TOTAL RAPID3 SCORE: 0

## 2020-07-23 NOTE — PATIENT INSTRUCTIONS
CRYOSURGERY      Your doctor has used a method called cryosurgery to treat your skin condition. Cryosurgery refers to the use of very cold substances to treat a variety of skin conditions such as warts, pre-skin cancers, molluscum contagiosum, sun spots, and several benign growths. The substance we use in cryosurgery is liquid nitrogen and is so cold (-195 degrees Celsius) that is burns when administered.     Following treatment in the office, the skin may immediately burn and become red. You may find the area around the lesion is affected as well. It is sometimes necessary to treat not only the lesion, but a small area of the surrounding normal skin to achieve a good response.     A blister, and even a blood filled blister, may form after treatment.   This is a normal response. If the blister is painful, it is acceptable to sterilize a needle and with rubbing alcohol and gently pop the blister. It is important that you gently wash the area with soap and warm water as the blister fluid may contain wart virus if a wart was treated. Do no remove the roof of the blister.     The area treated can take anywhere from 1-3 weeks to heal. Healing time depends on the kind skin lesion treated, the location, and how aggressively the lesion was treated. It is recommended that the areas treated are covered with Vaseline or bacitracin ointment and a band-aid. If a band-aid is not practical, just ointment applied several times per day will do. Keeping these areas moist will speed the healing time.    Treatment with liquid nitrogen can leave a scar. In dark skin, it may be a light or dark scar, in light skin it may be a white or pink scar. These will generally fade with time, but may never go away completely.     If you have any concerns after your treatment, please feel free to call the office.       [address] [place] [address]/ [phone] FAX/ [email or web address]     Shave Biopsy Wound Care    Your  "doctor has performed a shave biopsy today.  A band aid and vaseline ointment has been placed over the site.  This should remain in place for 24 hours.  It is recommended that you keep the area dry for the first 24 hours.  After 24 hours, you may remove the band aid and wash the area with warm soap and water and apply Vaseline jelly.  Many patients prefer to use Neosporin or Bacitracin ointment.  This is acceptable; however, know that you can develop an allergy to this medication even if you have used it safely for years.  It is important to keep the area moist.  Letting it dry out and get air slows healing time, and will worsen the scar.  Band aid is optional after first 24 hours.      If you notice increasing redness, tenderness, pain, or yellow drainage at the biopsy site, please notify your doctor.  These are signs of an infection.    If your biopsy site is bleeding, apply firm pressure for 15 minutes straight.  Repeat for another 15 minutes, if it is still bleeding.   If the surgical site continues to bleed, then please contact your doctor.      For [facility] users:   You will receive a [facility] notification after the pathologist has finished reviewing your biopsy specimen. Pathology results, however, will not be released online so you will see a "no content" message. Once your dermatologist reviews and clinically correlates your biopsy results, you will either receive a letter in the mail with the results of a phone call from your doctor's office if further explanation or treatment is warranted.       [address] [place] [address]/ [phone] FAX/ [email or web address]        "

## 2020-07-23 NOTE — PROGRESS NOTES
Subjective:       Patient ID:  Jarrett Charlton Jr. is a 70 y.o. male who presents for   Chief Complaint   Patient presents with    Skin Check     ubse      HPI Pt presents today for skin lesions on his arms and L helix x 1 year growing ,    Pt with multiple h/o multiple AKs, SCCIS, SCC.  SCC left hand s/p scoop shave with negative margins in October 2018.  Had poor response to PDT (120 minute incubation) Feb. 2018    Used Tolak after last visit to hands and forearms with initial improvement but not numerous pink, scaly indurated papules.    Review of Systems   Constitutional: Negative for fever, chills, weight loss, weight gain, fatigue, night sweats and malaise.   Respiratory: Negative for cough and shortness of breath.    Skin: Positive for daily sunscreen use and activity-related sunscreen use. Negative for wears hat.   Hematologic/Lymphatic: Bruises/bleeds easily.        Objective:    Physical Exam   Constitutional: He appears well-developed and well-nourished.   Neurological: He is alert and oriented to person, place, and time.   Psychiatric: He has a normal mood and affect.   Skin:   Areas Examined (abnormalities noted in diagram):   Scalp / Hair Palpated and Inspected  Head / Face Inspection Performed  Neck Inspection Performed  Chest / Axilla Inspection Performed  Abdomen Inspection Performed  Back Inspection Performed  RUE Inspected  LUE Inspection Performed                   Diagram Legend     Erythematous scaling macule/papule c/w actinic keratosis       Vascular papule c/w angioma      Pigmented verrucoid papule/plaque c/w seborrheic keratosis      Yellow umbilicated papule c/w sebaceous hyperplasia      Irregularly shaped tan macule c/w lentigo     1-2 mm smooth white papules consistent with Milia      Movable subcutaneous cyst with punctum c/w epidermal inclusion cyst      Subcutaneous movable cyst c/w pilar cyst      Firm pink to brown papule c/w dermatofibroma      Pedunculated fleshy papule(s)  c/w skin tag(s)      Evenly pigmented macule c/w junctional nevus     Mildly variegated pigmented, slightly irregular-bordered macule c/w mildly atypical nevus      Flesh colored to evenly pigmented papule c/w intradermal nevus       Pink pearly papule/plaque c/w basal cell carcinoma      Erythematous hyperkeratotic cursted plaque c/w SCC      Surgical scar with no sign of skin cancer recurrence      Open and closed comedones      Inflammatory papules and pustules      Verrucoid papule consistent consistent with wart     Erythematous eczematous patches and plaques     Dystrophic onycholytic nail with subungual debris c/w onychomycosis     Umbilicated papule    Erythematous-base heme-crusted tan verrucoid plaque consistent with inflamed seborrheic keratosis     Erythematous Silvery Scaling Plaque c/w Psoriasis     See annotation      Assessment / Plan:      Pathology Orders:     Normal Orders This Visit    Specimen to Pathology, Dermatology     Comments:    Number of Specimens:->4  ------------------------->-------------------------  Spec 1 Procedure:->Biopsy  Spec 1 Clinical Impression:->r/o SCC  Spec 1 Source:->left superior helix  ------------------------->-------------------------  Spec 2 Procedure:->Biopsy  Spec 2 Clinical Impression:->r/o ISK vs NMSC  Spec 2 Source:->right sideburn  ------------------------->-------------------------  Spec 3 Procedure:->Biopsy  Spec 3 Clinical Impression:->r/o SCC  Spec 3 Source:->left upper arm  ------------------------->-------------------------  Spec 4 Procedure:->Biopsy  Spec 4 Clinical Impression:->r/o SCC  Spec 4 Source:->left forearm    Questions:    Procedure Type: Dermatology and skin neoplasms    Number of Specimens: 4    ------------------------: -------------------------    Spec 1 Procedure: Biopsy    Spec 1 Clinical Impression: r/o SCC    Spec 1 Source: left superior helix    ------------------------: -------------------------    Spec 2 Procedure: Biopsy    Spec 2  Clinical Impression: r/o ISK vs NMSC    Spec 2 Source: right sideburn    ------------------------: -------------------------    Spec 3 Procedure: Biopsy    Spec 3 Clinical Impression: r/o SCC    Spec 3 Source: left upper arm    ------------------------: -------------------------    Spec 4 Procedure: Biopsy    Spec 4 Clinical Impression: r/o SCC    Spec 4 Source: left forearm        Neoplasm of uncertain behavior of skin  -     Specimen to Pathology, Dermatology    Shave biopsy procedure note:    Shave biopsies x 4 performed after verbal consent including risk of infection, scar, recurrence, need for additional treatment of site. Area prepped with alcohol, anesthetized with approximately 1.0cc of 1% lidocaine with epinephrine. Lesional tissue shaved with razor blade. Hemostasis achieved with application of aluminum chloride followed by hyfrecation. No complications. Dressing applied. Wound care explained.    If biopsy positive for malignancy, refer to Dr. Kimball for Mohs surgery consultation.          Multiple actinic keratoses - hands and forearms  -     fluorouraciL (TOLAK) 4 % Crea; Apply 1 application topically once daily. for 21 days  Dispense: 40 g; Refill: 3    History of squamous cell carcinoma in situ (SCCIS) of skin  -     fluorouraciL (TOLAK) 4 % Crea; Apply 1 application topically once daily. for 21 days  Dispense: 40 g; Refill: 3             No follow-ups on file.

## 2020-07-24 NOTE — PROGRESS NOTES
History of present illness:  70-year-old gentleman with ulcerative colitis and mild renal insufficiency.  I follow him for gouty arthritis.  He remains on allopurinol 450 mg daily.  His last uric acid level was normal.    He comes in now because of a 3-4 month history of aching in his legs.  The pain was of gradual onset.  It is intermittent.  It bothers him especially when he gets up in the morning.  It does not bother him as much during the day.  It does not wake him up at night.  The pain is from the knee on down.  He has had no swelling in the leg.  He has had no claudication.  He does have a history of peripheral neuropathy but his numbness has not been any worse recently.  He remains on gabapentin.  He did sprain his knee 2 months ago but this has resolved.  That was different from his leg pain.    He also had an episode of pain and swelling in his left thumb.  He does not remember anything that might have brought it on.  He took prednisone for several days and it went away.  He had no erythema but some increased warmth accompanying the swelling.  This was similar to prior gout attacks but a different location.  He denies other recent medical problems.  His colitis has been stable.    Physical examination:  Musculoskeletal:  Shoulders, elbows, wrists are unremarkable.  He has bony hypertrophy of the IP joint of the thumb and his PIPs.  He has no synovitis.  He has tenderness of the IP joint of the thumb bilaterally.  Lumbar spine has good range of motion without pain on range of motion.  Hips are unremarkable.  He has bony hypertrophy of the right knee with small effusion.  He has decreased range of motion of the knee.  Left knee has good range of motion without pain on range of motion.  He has no tenderness in his calfs.  He has no swelling.  Ankles have full range of motion without pain on range of motion.  Small joints the feet are unremarkable.    Assessment:  1.  His thumb problem was probably a gout  flare.  This is the 1st flare since his last visit in November.  2.  He does have evidence of osteoarthritis, especially of the right knee.  3.  The leg pain may be a combination of his osteoarthritis and his peripheral neuropathy    Plans:  1.  I am making no changes as medication at this time  2.  Return to see me in 12 months.

## 2020-07-27 LAB
FINAL PATHOLOGIC DIAGNOSIS: NORMAL
GROSS: NORMAL

## 2020-07-29 ENCOUNTER — OFFICE VISIT (OUTPATIENT)
Dept: PRIMARY CARE CLINIC | Facility: CLINIC | Age: 70
End: 2020-07-29
Payer: MEDICARE

## 2020-07-29 ENCOUNTER — TELEPHONE (OUTPATIENT)
Dept: PRIMARY CARE CLINIC | Facility: CLINIC | Age: 70
End: 2020-07-29

## 2020-07-29 VITALS
BODY MASS INDEX: 27.98 KG/M2 | HEIGHT: 74 IN | HEART RATE: 73 BPM | OXYGEN SATURATION: 97 % | DIASTOLIC BLOOD PRESSURE: 84 MMHG | SYSTOLIC BLOOD PRESSURE: 126 MMHG | WEIGHT: 218 LBS | TEMPERATURE: 98 F | RESPIRATION RATE: 16 BRPM

## 2020-07-29 DIAGNOSIS — S51.812D LACERATION OF SKIN OF LEFT FOREARM, SUBSEQUENT ENCOUNTER: Primary | ICD-10-CM
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
## 2020-07-29 DIAGNOSIS — S51.802D: ICD-10-CM

## 2020-07-29 PROCEDURE — 1159F PR MEDICATION LIST DOCUMENTED IN MEDICAL RECORD: ICD-10-PCS | Mod: S$GLB,,, | Performed by: INTERNAL MEDICINE

## 2020-07-29 PROCEDURE — 1100F PTFALLS ASSESS-DOCD GE2>/YR: CPT | Mod: CPTII,S$GLB,, | Performed by: INTERNAL MEDICINE

## 2020-07-29 PROCEDURE — 3074F SYST BP LT 130 MM HG: CPT | Mod: CPTII,S$GLB,, | Performed by: INTERNAL MEDICINE

## 2020-07-29 PROCEDURE — 3074F PR MOST RECENT SYSTOLIC BLOOD PRESSURE < 130 MM HG: ICD-10-PCS | Mod: CPTII,S$GLB,, | Performed by: INTERNAL MEDICINE

## 2020-07-29 PROCEDURE — 3288F PR FALLS RISK ASSESSMENT DOCUMENTED: ICD-10-PCS | Mod: CPTII,S$GLB,, | Performed by: INTERNAL MEDICINE

## 2020-07-29 PROCEDURE — 3079F DIAST BP 80-89 MM HG: CPT | Mod: CPTII,S$GLB,, | Performed by: INTERNAL MEDICINE

## 2020-07-29 PROCEDURE — 99213 OFFICE O/P EST LOW 20 MIN: CPT | Mod: S$GLB,,, | Performed by: INTERNAL MEDICINE

## 2020-07-29 PROCEDURE — 3288F FALL RISK ASSESSMENT DOCD: CPT | Mod: CPTII,S$GLB,, | Performed by: INTERNAL MEDICINE

## 2020-07-29 PROCEDURE — 99999 PR PBB SHADOW E&M-EST. PATIENT-LVL V: ICD-10-PCS | Mod: PBBFAC,,, | Performed by: INTERNAL MEDICINE

## 2020-07-29 PROCEDURE — 1159F MED LIST DOCD IN RCRD: CPT | Mod: S$GLB,,, | Performed by: INTERNAL MEDICINE

## 2020-07-29 PROCEDURE — 1125F AMNT PAIN NOTED PAIN PRSNT: CPT | Mod: S$GLB,,, | Performed by: INTERNAL MEDICINE

## 2020-07-29 PROCEDURE — 3079F PR MOST RECENT DIASTOLIC BLOOD PRESSURE 80-89 MM HG: ICD-10-PCS | Mod: CPTII,S$GLB,, | Performed by: INTERNAL MEDICINE

## 2020-07-29 PROCEDURE — 1125F PR PAIN SEVERITY QUANTIFIED, PAIN PRESENT: ICD-10-PCS | Mod: S$GLB,,, | Performed by: INTERNAL MEDICINE

## 2020-07-29 PROCEDURE — 3008F BODY MASS INDEX DOCD: CPT | Mod: CPTII,S$GLB,, | Performed by: INTERNAL MEDICINE

## 2020-07-29 PROCEDURE — 99213 PR OFFICE/OUTPT VISIT, EST, LEVL III, 20-29 MIN: ICD-10-PCS | Mod: S$GLB,,, | Performed by: INTERNAL MEDICINE

## 2020-07-29 PROCEDURE — 1100F PR PT FALLS ASSESS DOC 2+ FALLS/FALL W/INJURY/YR: ICD-10-PCS | Mod: CPTII,S$GLB,, | Performed by: INTERNAL MEDICINE

## 2020-07-29 PROCEDURE — 3008F PR BODY MASS INDEX (BMI) DOCUMENTED: ICD-10-PCS | Mod: CPTII,S$GLB,, | Performed by: INTERNAL MEDICINE

## 2020-07-29 PROCEDURE — 99999 PR PBB SHADOW E&M-EST. PATIENT-LVL V: CPT | Mod: PBBFAC,,, | Performed by: INTERNAL MEDICINE

## 2020-07-29 NOTE — TELEPHONE ENCOUNTER
Spoke with patient and scheduled him for a nurse visit to be evaluated. Patient was also scheduled to see Dr. Gonzalez on 08/05 for a follow up. Patient stated understanding

## 2020-07-29 NOTE — TELEPHONE ENCOUNTER
----- Message from Jess Silverio sent at 7/29/2020  8:13 AM CDT -----  Contact: Patient  Type:  Sooner Apoointment Request    Caller is requesting a sooner appointment.  Caller declined first available appointment listed below.  Caller will not accept being placed on the waitlist and is requesting a message be sent to doctor.    Name of Caller:  Jarrett, patient  When is the first available appointment?  08/24/2020  Symptoms:  Follow up [facility] 07/28/2020 for arm laceration, diabetes  Best Call Back Number:  [phone]  Additional Information:  Please call him. Thanks.

## 2020-07-29 NOTE — PROGRESS NOTES
Subjective:       Patient ID: Jarrett Charlton Jr. is a 70 y.o. male.    Chief Complaint: Follow-up (ER follow up 07/28/20)    HPI  Pt felt at home landed left side on the edge of cement cub with laceration avulsion left forearm went to ER had Td vaccine and pressure dressing with coban pt is here for f/u  No LOC no HA left shoulder hurt from fall but better pt also has to take care his wife who is wheel chair bounced and completely dependent on pt for care  Review of Systems    Objective:      Physical Exam  Vitals signs and nursing note reviewed.   Constitutional:       General: He is not in acute distress.     Appearance: He is well-developed.   HENT:      Head: Normocephalic and atraumatic.      Right Ear: External ear normal.      Left Ear: External ear normal.      Nose: Nose normal.      Mouth/Throat:      Pharynx: No oropharyngeal exudate.   Eyes:      Extraocular Movements: Extraocular movements intact.      Conjunctiva/sclera: Conjunctivae normal.      Pupils: Pupils are equal, round, and reactive to light.   Neck:      Musculoskeletal: Normal range of motion and neck supple.      Thyroid: No thyromegaly.   Cardiovascular:      Rate and Rhythm: Normal rate and regular rhythm.      Heart sounds: Normal heart sounds. No murmur. No friction rub. No gallop.    Pulmonary:      Effort: Pulmonary effort is normal. No respiratory distress.      Breath sounds: Normal breath sounds. No wheezing.   Abdominal:      General: Bowel sounds are normal. There is no distension.      Palpations: Abdomen is soft.      Tenderness: There is no abdominal tenderness. There is no guarding.   Musculoskeletal: Normal range of motion.         General: No tenderness or deformity.   Lymphadenopathy:      Cervical: No cervical adenopathy.   Skin:     General: Skin is warm and dry.      Capillary Refill: Capillary refill takes less than 2 seconds.      Findings: No erythema or rash.      Comments: Left forearm wit large piece of skin  avulsed from the subcutaneus skin was not approximate properly with larged area denuded skin with old blood and laceration in the distal forearm 3 cm long no bleeding wound are opoen no exudate no active bleeding   Neurological:      Mental Status: He is alert and oriented to person, place, and time.   Psychiatric:         Mood and Affect: Mood normal.         Thought Content: Thought content normal.         Judgment: Judgment normal.         Assessment:       1. Laceration of skin of left forearm, subsequent encounter    2. Avulsion of left forearm, subsequent encounter    3. Fall, subsequent encounter        Plan:       Laceration of skin of left forearm, subsequent encounter  Comments:  wound care in office and apply vaseline guase recheck and change in 2 days    Avulsion of left forearm, subsequent encounter  Comments:  wound care in office q o d    Fall, subsequent encounter

## 2020-07-31 ENCOUNTER — CLINICAL SUPPORT (OUTPATIENT)
Dept: PRIMARY CARE CLINIC | Facility: CLINIC | Age: 70
End: 2020-07-31
Payer: MEDICARE

## 2020-07-31 DIAGNOSIS — Z51.89 ENCOUNTER FOR WOUND CARE: Primary | ICD-10-CM

## 2020-07-31 NOTE — PROGRESS NOTES
The wound is cleansed, debrided of foreign material as much as possible, and dressed. The patient is alerted to watch for any signs of infection (redness, pus, pain, increased swelling or fever) and call if such occurs. Home wound care instructions are provided. Tetanus vaccination status reviewed: Td vaccination indicated and given 07/28/2020.

## 2020-08-04 ENCOUNTER — TELEPHONE (OUTPATIENT)
Dept: DERMATOLOGY | Facility: CLINIC | Age: 70
End: 2020-08-04

## 2020-08-04 NOTE — TELEPHONE ENCOUNTER
----- Message from Blanca Bonner sent at 8/4/2020 11:57 AM CDT -----  Regarding: speak with nurse  Contact: patient  [phone]-please call patient need to speak with the nurse concerning medication he is taking before getting off for his surgery waiting on a call back thanks.

## 2020-08-04 NOTE — TELEPHONE ENCOUNTER
Spoke with patient and reassured him he is to stay on all normal medications including his baby aspiring and plavix. Pt expressed verbal understanding.

## 2020-08-04 NOTE — TELEPHONE ENCOUNTER
Spoke to Falguni at [facility] pharmacy and she states the [place] 4% cream is on back order and the manufacture does not have a release date. Please send in alternative. Efudex 5% is available per Falguni.    Thanks,    JLIN

## 2020-08-05 ENCOUNTER — OFFICE VISIT (OUTPATIENT)
Dept: PRIMARY CARE CLINIC | Facility: CLINIC | Age: 70
End: 2020-08-05
Payer: MEDICARE

## 2020-08-05 ENCOUNTER — PATIENT OUTREACH (OUTPATIENT)
Dept: ADMINISTRATIVE | Facility: OTHER | Age: 70
End: 2020-08-05

## 2020-08-05 VITALS
WEIGHT: 220 LBS | TEMPERATURE: 99 F | RESPIRATION RATE: 16 BRPM | SYSTOLIC BLOOD PRESSURE: 130 MMHG | OXYGEN SATURATION: 99 % | HEART RATE: 71 BPM | DIASTOLIC BLOOD PRESSURE: 92 MMHG | HEIGHT: 74 IN | BODY MASS INDEX: 28.23 KG/M2

## 2020-08-05 DIAGNOSIS — S51.812D LACERATION OF SKIN OF LEFT FOREARM, SUBSEQUENT ENCOUNTER: Primary | ICD-10-CM

## 2020-08-05 DIAGNOSIS — M70.22 OLECRANON BURSITIS OF LEFT ELBOW: ICD-10-CM

## 2020-08-05 DIAGNOSIS — S51.802D: ICD-10-CM

## 2020-08-05 PROCEDURE — 3080F DIAST BP >= 90 MM HG: CPT | Mod: CPTII,S$GLB,, | Performed by: INTERNAL MEDICINE

## 2020-08-05 PROCEDURE — 3008F BODY MASS INDEX DOCD: CPT | Mod: CPTII,S$GLB,, | Performed by: INTERNAL MEDICINE

## 2020-08-05 PROCEDURE — 99999 PR PBB SHADOW E&M-EST. PATIENT-LVL V: CPT | Mod: PBBFAC,,, | Performed by: INTERNAL MEDICINE

## 2020-08-05 PROCEDURE — 3008F PR BODY MASS INDEX (BMI) DOCUMENTED: ICD-10-PCS | Mod: CPTII,S$GLB,, | Performed by: INTERNAL MEDICINE

## 2020-08-05 PROCEDURE — 99999 PR PBB SHADOW E&M-EST. PATIENT-LVL V: ICD-10-PCS | Mod: PBBFAC,,, | Performed by: INTERNAL MEDICINE

## 2020-08-05 PROCEDURE — 99213 PR OFFICE/OUTPT VISIT, EST, LEVL III, 20-29 MIN: ICD-10-PCS | Mod: S$GLB,,, | Performed by: INTERNAL MEDICINE

## 2020-08-05 PROCEDURE — 1159F PR MEDICATION LIST DOCUMENTED IN MEDICAL RECORD: ICD-10-PCS | Mod: S$GLB,,, | Performed by: INTERNAL MEDICINE

## 2020-08-05 PROCEDURE — 3080F PR MOST RECENT DIASTOLIC BLOOD PRESSURE >= 90 MM HG: ICD-10-PCS | Mod: CPTII,S$GLB,, | Performed by: INTERNAL MEDICINE

## 2020-08-05 PROCEDURE — 1159F MED LIST DOCD IN RCRD: CPT | Mod: S$GLB,,, | Performed by: INTERNAL MEDICINE

## 2020-08-05 PROCEDURE — 1101F PR PT FALLS ASSESS DOC 0-1 FALLS W/OUT INJ PAST YR: ICD-10-PCS | Mod: CPTII,S$GLB,, | Performed by: INTERNAL MEDICINE

## 2020-08-05 PROCEDURE — 99213 OFFICE O/P EST LOW 20 MIN: CPT | Mod: S$GLB,,, | Performed by: INTERNAL MEDICINE

## 2020-08-05 PROCEDURE — 1125F PR PAIN SEVERITY QUANTIFIED, PAIN PRESENT: ICD-10-PCS | Mod: S$GLB,,, | Performed by: INTERNAL MEDICINE

## 2020-08-05 PROCEDURE — 3075F SYST BP GE 130 - 139MM HG: CPT | Mod: CPTII,S$GLB,, | Performed by: INTERNAL MEDICINE

## 2020-08-05 PROCEDURE — 3075F PR MOST RECENT SYSTOLIC BLOOD PRESS GE 130-139MM HG: ICD-10-PCS | Mod: CPTII,S$GLB,, | Performed by: INTERNAL MEDICINE

## 2020-08-05 PROCEDURE — 1125F AMNT PAIN NOTED PAIN PRSNT: CPT | Mod: S$GLB,,, | Performed by: INTERNAL MEDICINE

## 2020-08-05 PROCEDURE — 1101F PT FALLS ASSESS-DOCD LE1/YR: CPT | Mod: CPTII,S$GLB,, | Performed by: INTERNAL MEDICINE

## 2020-08-05 RX ORDER — HYDROCODONE BITARTRATE AND ACETAMINOPHEN 5; 325 MG/1; MG/1
1 TABLET ORAL EVERY 12 HOURS PRN
Qty: 20 TABLET | Refills: 0 | Status: SHIPPED | OUTPATIENT
Start: 2020-08-05 | End: 2020-10-02

## 2020-08-05 RX ORDER — INDOMETHACIN 50 MG/1
50 CAPSULE ORAL 2 TIMES DAILY WITH MEALS
Qty: 14 CAPSULE | Refills: 0 | Status: SHIPPED | OUTPATIENT
Start: 2020-08-05 | End: 2020-08-12

## 2020-08-05 RX ORDER — CLINDAMYCIN HYDROCHLORIDE 300 MG/1
300 CAPSULE ORAL EVERY 6 HOURS
Qty: 40 CAPSULE | Refills: 0 | Status: SHIPPED | OUTPATIENT
Start: 2020-08-05 | End: 2020-10-02

## 2020-08-05 NOTE — PROGRESS NOTES
Subjective:       Patient ID: Jarrett Charlton Jr. is a 70 y.o. male.    Chief Complaint: Hospital Follow Up (Injury to Left arm, currently taking antibiotics - Clindamycin ) and Arm Pain (left arm swelling x 2 days )    HPI  Pt is here for f/u wound care left forearm from fall with laceration and avulsion of skin wounds is improving but pt c/o swelling and redness left elbow no fever chill  Sob cp . Pt rangel sappt with Dr Young on Friday for further treatments and ?bilogical skin draft  Review of Systems    Objective:      Physical Exam  Vitals signs and nursing note reviewed.   Constitutional:       General: He is not in acute distress.     Appearance: He is well-developed.   HENT:      Head: Normocephalic and atraumatic.      Right Ear: External ear normal.      Left Ear: External ear normal.   Eyes:      Extraocular Movements: Extraocular movements intact.      Conjunctiva/sclera: Conjunctivae normal.      Pupils: Pupils are equal, round, and reactive to light.   Neck:      Thyroid: No thyromegaly.   Cardiovascular:      Rate and Rhythm: Normal rate and regular rhythm.      Heart sounds: Normal heart sounds. No murmur. No friction rub. No gallop.    Pulmonary:      Effort: Pulmonary effort is normal. No respiratory distress.      Breath sounds: Normal breath sounds. No wheezing or rales.   Chest:      Chest wall: No tenderness.   Abdominal:      General: Bowel sounds are normal. There is no distension.      Palpations: Abdomen is soft.      Tenderness: There is no abdominal tenderness.   Musculoskeletal: Normal range of motion.         General: No tenderness or deformity.   Skin:     General: Skin is warm and dry.      Findings: No erythema or rash.      Comments: laceratio distal left forearm wound is healing no exudate no erythema ha sold blood  A large area denuded skin proximal forearm lateral aspect withexposed sub cutaneous tissue withbloody mucous no surroung erythema . Left elbow tender erythematous  at the [facility]   Neurological:      General: No focal deficit present.      Mental Status: He is alert and oriented to person, place, and time.   Psychiatric:         Mood and Affect: Mood normal.         Thought Content: Thought content normal.         Judgment: Judgment normal.         Assessment:       1. Laceration of skin of left forearm, subsequent encounter    2. Olecranon bursitis of left elbow    3. Avulsion of left forearm, subsequent encounter        Plan:       Laceration of skin of left forearm, subsequent encounter  -     clindamycin (CLEOCIN) 300 MG capsule; Take 1 capsule (300 mg total) by mouth every 6 (six) hours.  Dispense: 40 capsule; Refill: 0  -     HYDROcodone-acetaminophen (NORCO) 5-325 mg per tablet; Take 1 tablet by mouth every 12 (twelve) hours as needed.  Dispense: 20 tablet; Refill: 0    Olecranon bursitis of left elbow  -     HYDROcodone-acetaminophen (NORCO) 5-325 mg per tablet; Take 1 tablet by mouth every 12 (twelve) hours as needed.  Dispense: 20 tablet; Refill: 0  -     indomethacin (INDOCIN) 50 MG capsule; Take 1 capsule (50 mg total) by mouth 2 (two) times daily with meals. for 7 days  Dispense: 14 capsule; Refill: 0    Avulsion of left forearm, subsequent encounter  Comments:  continue local wound care and appt with surgery in 2 days

## 2020-08-07 ENCOUNTER — OFFICE VISIT (OUTPATIENT)
Dept: SURGERY | Facility: CLINIC | Age: 70
End: 2020-08-07
Payer: MEDICARE

## 2020-08-07 VITALS
TEMPERATURE: 98 F | SYSTOLIC BLOOD PRESSURE: 135 MMHG | BODY MASS INDEX: 27.73 KG/M2 | OXYGEN SATURATION: 97 % | HEART RATE: 74 BPM | DIASTOLIC BLOOD PRESSURE: 87 MMHG | HEIGHT: 74 IN | RESPIRATION RATE: 16 BRPM | WEIGHT: 216.06 LBS

## 2020-08-07 DIAGNOSIS — S51.812D LACERATION OF SKIN OF LEFT FOREARM, SUBSEQUENT ENCOUNTER: ICD-10-CM

## 2020-08-07 PROCEDURE — 1101F PT FALLS ASSESS-DOCD LE1/YR: CPT | Mod: CPTII,S$GLB,, | Performed by: SURGERY

## 2020-08-07 PROCEDURE — 3075F PR MOST RECENT SYSTOLIC BLOOD PRESS GE 130-139MM HG: ICD-10-PCS | Mod: CPTII,S$GLB,, | Performed by: SURGERY

## 2020-08-07 PROCEDURE — 3008F BODY MASS INDEX DOCD: CPT | Mod: CPTII,S$GLB,, | Performed by: SURGERY

## 2020-08-07 PROCEDURE — 99213 PR OFFICE/OUTPT VISIT, EST, LEVL III, 20-29 MIN: ICD-10-PCS | Mod: S$GLB,,, | Performed by: SURGERY

## 2020-08-07 PROCEDURE — 99213 OFFICE O/P EST LOW 20 MIN: CPT | Mod: S$GLB,,, | Performed by: SURGERY

## 2020-08-07 PROCEDURE — 1126F PR PAIN SEVERITY QUANTIFIED, NO PAIN PRESENT: ICD-10-PCS | Mod: S$GLB,,, | Performed by: SURGERY

## 2020-08-07 PROCEDURE — 3079F PR MOST RECENT DIASTOLIC BLOOD PRESSURE 80-89 MM HG: ICD-10-PCS | Mod: CPTII,S$GLB,, | Performed by: SURGERY

## 2020-08-07 PROCEDURE — 3075F SYST BP GE 130 - 139MM HG: CPT | Mod: CPTII,S$GLB,, | Performed by: SURGERY

## 2020-08-07 PROCEDURE — 3079F DIAST BP 80-89 MM HG: CPT | Mod: CPTII,S$GLB,, | Performed by: SURGERY

## 2020-08-07 PROCEDURE — 1101F PR PT FALLS ASSESS DOC 0-1 FALLS W/OUT INJ PAST YR: ICD-10-PCS | Mod: CPTII,S$GLB,, | Performed by: SURGERY

## 2020-08-07 PROCEDURE — 1159F PR MEDICATION LIST DOCUMENTED IN MEDICAL RECORD: ICD-10-PCS | Mod: S$GLB,,, | Performed by: SURGERY

## 2020-08-07 PROCEDURE — 3008F PR BODY MASS INDEX (BMI) DOCUMENTED: ICD-10-PCS | Mod: CPTII,S$GLB,, | Performed by: SURGERY

## 2020-08-07 PROCEDURE — 1126F AMNT PAIN NOTED NONE PRSNT: CPT | Mod: S$GLB,,, | Performed by: SURGERY

## 2020-08-07 PROCEDURE — 99999 PR PBB SHADOW E&M-EST. PATIENT-LVL V: ICD-10-PCS | Mod: PBBFAC,,, | Performed by: SURGERY

## 2020-08-07 PROCEDURE — 1159F MED LIST DOCD IN RCRD: CPT | Mod: S$GLB,,, | Performed by: SURGERY

## 2020-08-07 PROCEDURE — 99999 PR PBB SHADOW E&M-EST. PATIENT-LVL V: CPT | Mod: PBBFAC,,, | Performed by: SURGERY

## 2020-08-07 NOTE — LETTER
August 10, 2020      Poli Gonzalez MD  [address] [place] [address]           [facility]  [address], [facility] [address]  [place] [address]  Phone: [phone]  Fax: [phone]          Patient: Jarrett Charlton Jr.   MR Number: [id number]   YOB: 1950   Date of Visit: 8/7/2020       Dear Dr. Poli Gonzalez:    Thank you for referring Jarrett Charlton to me for evaluation. Attached you will find relevant portions of my assessment and plan of care.    If you have questions, please do not hesitate to call me. I look forward to following Jarrett Charlton along with you.    Sincerely,    Marbin Lake MD    Enclosure  CC:  No Recipients    If you would like to receive this communication electronically, please contact [email or web address] or [phone] to request more information on [employer] Link access.    For providers and/or their staff who would like to refer a patient to [facility], please contact us through our one-stop-shop provider referral line, [facility], at [phone].    If you feel you have received this communication in error or would no longer like to receive these types of communications, please e-mail [email or web address]

## 2020-08-10 ENCOUNTER — TELEPHONE (OUTPATIENT)
Dept: DERMATOLOGY | Facility: CLINIC | Age: 70
End: 2020-08-10

## 2020-08-10 NOTE — TELEPHONE ENCOUNTER
Called pt to confirm Mohs procedure on 8/12/2020. Did Covid screening, negative. Okay to proceed with Mohs as planned.

## 2020-08-11 ENCOUNTER — OFFICE VISIT (OUTPATIENT)
Dept: PRIMARY CARE CLINIC | Facility: CLINIC | Age: 70
End: 2020-08-11
Payer: MEDICARE

## 2020-08-11 VITALS
HEART RATE: 83 BPM | TEMPERATURE: 98 F | SYSTOLIC BLOOD PRESSURE: 170 MMHG | OXYGEN SATURATION: 97 % | WEIGHT: 220 LBS | HEIGHT: 74 IN | RESPIRATION RATE: 18 BRPM | DIASTOLIC BLOOD PRESSURE: 92 MMHG | BODY MASS INDEX: 28.23 KG/M2

## 2020-08-11 DIAGNOSIS — N18.30 CHRONIC RENAL IMPAIRMENT, STAGE 3 (MODERATE): ICD-10-CM

## 2020-08-11 DIAGNOSIS — E11.9 TYPE 2 DIABETES MELLITUS WITHOUT COMPLICATION, WITHOUT LONG-TERM CURRENT USE OF INSULIN: ICD-10-CM

## 2020-08-11 DIAGNOSIS — E11.22 TYPE 2 DIABETES MELLITUS WITH STAGE 2 CHRONIC KIDNEY DISEASE, WITHOUT LONG-TERM CURRENT USE OF INSULIN: Primary | ICD-10-CM

## 2020-08-11 DIAGNOSIS — F43.21 GRIEF: ICD-10-CM

## 2020-08-11 DIAGNOSIS — E78.5 HYPERLIPIDEMIA, UNSPECIFIED HYPERLIPIDEMIA TYPE: ICD-10-CM

## 2020-08-11 DIAGNOSIS — I10 ESSENTIAL HYPERTENSION: ICD-10-CM

## 2020-08-11 DIAGNOSIS — N18.2 TYPE 2 DIABETES MELLITUS WITH STAGE 2 CHRONIC KIDNEY DISEASE, WITHOUT LONG-TERM CURRENT USE OF INSULIN: Primary | ICD-10-CM

## 2020-08-11 PROBLEM — R55 SYNCOPE: Status: RESOLVED | Noted: 2019-12-28 | Resolved: 2020-08-11

## 2020-08-11 PROBLEM — R06.09 DOE (DYSPNEA ON EXERTION): Status: RESOLVED | Noted: 2020-06-10 | Resolved: 2020-08-11

## 2020-08-11 PROBLEM — R42 DIZZINESS: Status: RESOLVED | Noted: 2019-06-10 | Resolved: 2020-08-11

## 2020-08-11 PROBLEM — C44.209: Status: RESOLVED | Noted: 2018-02-22 | Resolved: 2020-08-11

## 2020-08-11 PROBLEM — Z93.2 PRESENCE OF ILEOSTOMY: Status: RESOLVED | Noted: 2019-02-01 | Resolved: 2020-08-11

## 2020-08-11 PROBLEM — R07.9 CHEST PAIN: Status: RESOLVED | Noted: 2018-06-27 | Resolved: 2020-08-11

## 2020-08-11 PROCEDURE — 1159F MED LIST DOCD IN RCRD: CPT | Mod: S$GLB,,, | Performed by: NURSE PRACTITIONER

## 2020-08-11 PROCEDURE — 3078F DIAST BP <80 MM HG: CPT | Mod: CPTII,S$GLB,, | Performed by: NURSE PRACTITIONER

## 2020-08-11 PROCEDURE — 1101F PR PT FALLS ASSESS DOC 0-1 FALLS W/OUT INJ PAST YR: ICD-10-PCS | Mod: CPTII,S$GLB,, | Performed by: NURSE PRACTITIONER

## 2020-08-11 PROCEDURE — 99999 PR PBB SHADOW E&M-EST. PATIENT-LVL V: CPT | Mod: PBBFAC,,, | Performed by: NURSE PRACTITIONER

## 2020-08-11 PROCEDURE — 99214 OFFICE O/P EST MOD 30 MIN: CPT | Mod: S$GLB,,, | Performed by: NURSE PRACTITIONER

## 2020-08-11 PROCEDURE — 1101F PT FALLS ASSESS-DOCD LE1/YR: CPT | Mod: CPTII,S$GLB,, | Performed by: NURSE PRACTITIONER

## 2020-08-11 PROCEDURE — 3008F BODY MASS INDEX DOCD: CPT | Mod: CPTII,S$GLB,, | Performed by: NURSE PRACTITIONER

## 2020-08-11 PROCEDURE — 3077F SYST BP >= 140 MM HG: CPT | Mod: CPTII,S$GLB,, | Performed by: NURSE PRACTITIONER

## 2020-08-11 PROCEDURE — 3078F PR MOST RECENT DIASTOLIC BLOOD PRESSURE < 80 MM HG: ICD-10-PCS | Mod: CPTII,S$GLB,, | Performed by: NURSE PRACTITIONER

## 2020-08-11 PROCEDURE — 1159F PR MEDICATION LIST DOCUMENTED IN MEDICAL RECORD: ICD-10-PCS | Mod: S$GLB,,, | Performed by: NURSE PRACTITIONER

## 2020-08-11 PROCEDURE — 3044F PR MOST RECENT HEMOGLOBIN A1C LEVEL <7.0%: ICD-10-PCS | Mod: CPTII,S$GLB,, | Performed by: NURSE PRACTITIONER

## 2020-08-11 PROCEDURE — 1125F PR PAIN SEVERITY QUANTIFIED, PAIN PRESENT: ICD-10-PCS | Mod: S$GLB,,, | Performed by: NURSE PRACTITIONER

## 2020-08-11 PROCEDURE — 1125F AMNT PAIN NOTED PAIN PRSNT: CPT | Mod: S$GLB,,, | Performed by: NURSE PRACTITIONER

## 2020-08-11 PROCEDURE — 3008F PR BODY MASS INDEX (BMI) DOCUMENTED: ICD-10-PCS | Mod: CPTII,S$GLB,, | Performed by: NURSE PRACTITIONER

## 2020-08-11 PROCEDURE — 3044F HG A1C LEVEL LT 7.0%: CPT | Mod: CPTII,S$GLB,, | Performed by: NURSE PRACTITIONER

## 2020-08-11 PROCEDURE — 99999 PR PBB SHADOW E&M-EST. PATIENT-LVL V: ICD-10-PCS | Mod: PBBFAC,,, | Performed by: NURSE PRACTITIONER

## 2020-08-11 PROCEDURE — 99214 PR OFFICE/OUTPT VISIT, EST, LEVL IV, 30-39 MIN: ICD-10-PCS | Mod: S$GLB,,, | Performed by: NURSE PRACTITIONER

## 2020-08-11 PROCEDURE — 3077F PR MOST RECENT SYSTOLIC BLOOD PRESSURE >= 140 MM HG: ICD-10-PCS | Mod: CPTII,S$GLB,, | Performed by: NURSE PRACTITIONER

## 2020-08-11 NOTE — PROGRESS NOTES
Chief Complaint  Chief Complaint   Patient presents with    Tinnitus       HPI    Jarrett Charlton Jr. is a 70 y.o. male that presents for tinnitus.    Patient reports intermittent dizziness and fall twice over the past two weeks. Has a h/o HTN currently on norvasc 2.5 mg, coreg 3.125 mg BID, and clonidine 0.1 mg daily and lisinopril. All of which he started approximately 3 weeks ago. Most recently he was placed on clonidine which he has noticed bottoms out his pressure. Most recent pressure on clonidine was 70/44. Upcoming appointment with Dr. Dao or Joanna on Thursday of this week. Did increase his coreg to 6.25 yesterday.  Patient with a h/o CAD, coronary stenting with an upcoming angiogram in October of this year. Does complain of intermittent chest pain left side, feels like someone is stabbing him. Reports the pain at night which wakes him from his sleep. Blood pressure medication that he took today was amlodipine, coreg at 1230 pm today.     PAST MEDICAL HISTORY:  Past Medical History:   Diagnosis Date    Basal cell carcinoma     BPH (benign prostatic hyperplasia)     s/p TURP    Carotid stenosis     Chronic kidney disease     Claudication     Coronary artery disease     DDD (degenerative disc disease) 10/21/2013    Diabetes mellitus with renal complications     Disc disease, degenerative, cervical     Encounter for blood transfusion     GERD (gastroesophageal reflux disease)     Gout, chronic     History of ulcerative colitis     s/p colectomy and ileostomy    HLD (hyperlipidemia)     HTN (hypertension)     Ileostomy in place 1982    RBBB     Squamous cell carcinoma 03/08/2018    Left superior helix near insertion    Squamous cell carcinoma 04/12/2018    Left forearm x 5    Ventricular tachycardia        PAST SURGICAL HISTORY:  Past Surgical History:   Procedure Laterality Date    cardiac stents      CATARACT EXTRACTION Bilateral     CERVICAL FUSION      colectomy and ileostomy   1985    TRANSURETHRAL RESECTION OF PROSTATE (TURP) WITHOUT USE OF LASER N/A 1/23/2019    Procedure: TURP, WITHOUT USING LASER BIPOLAR;  Surgeon: Catarino Mota MD;  Location: [address];  Service: Urology;  Laterality: N/A;  1.5 HOURS       SOCIAL HISTORY:  Social History     Socioeconomic History    Marital status:      Spouse name: Not on file    Number of children: 1    Years of education: Not on file    Highest education level: Not on file   Occupational History    Occupation:  x 44 years     Comment: Retired    Occupation: [place]    Social Needs    Financial resource strain: Not on file    Food insecurity     Worry: Not on file     Inability: Not on file    Transportation needs     Medical: Not on file     Non-medical: Not on file   Tobacco Use    Smoking status: Never Smoker    Smokeless tobacco: Never Used   Substance and Sexual Activity    Alcohol use: No    Drug use: No    Sexual activity: Not Currently     Partners: Female   Lifestyle    Physical activity     Days per week: Not on file     Minutes per session: Not on file    Stress: Not on file   Relationships    Social connections     Talks on phone: Not on file     Gets together: Not on file     Attends Mosque service: Not on file     Active member of club or organization: Not on file     Attends meetings of clubs or organizations: Not on file     Relationship status: Not on file   Other Topics Concern    Not on file   Social History Narrative    Not on file       FAMILY HISTORY:  Family History   Problem Relation Age of Onset    Cancer Father     Diabetes Father     Dementia Mother     Melanoma Neg Hx     Hypertension Neg Hx     Arthritis Neg Hx        ALLERGIES AND MEDICATIONS: updated and reviewed.  Review of patient's allergies indicates:  No Known Allergies  Current Outpatient Medications   Medication Sig Dispense Refill    ACCU-CHEK PAUL CONTROL SOLN Soln 1 drop by NOT  APPLICABLE route once daily.      ACCU-CHEK SOFTCLIX LANCETS Misc 1 lancet by NOT APPLICABLE route once daily.      allopurinoL (ZYLOPRIM) 300 MG tablet Take 1.5 tablets (450 mg total) by mouth once daily. 135 tablet 3    amLODIPine (NORVASC) 2.5 MG tablet Take 2.5 tablets by mouth once daily.      aspirin (ECOTRIN) 81 MG EC tablet Take 81 mg by mouth once daily.        BD ALCOHOL SWABS PadM       blood sugar diagnostic (ONE TOUCH ULTRA TEST) [facility] USE ONE STRIP TO CHECK GLUCOSE EVERY  each 11    brimonidine 0.2% (ALPHAGAN) 0.2 % Drop INSTILL 1 DROP INTO EACH EYE TWICE DAILY  4    carvediloL (COREG) 6.25 MG tablet       clindamycin (CLEOCIN) 300 MG capsule Take 1 capsule (300 mg total) by mouth every 6 (six) hours. 40 capsule 0    cloNIDine (CATAPRES) 0.1 MG tablet Take 0.5 mg by mouth once daily.      clopidogrel (PLAVIX) 75 mg tablet once daily.       ergocalciferol, vitamin D2, (VITAMIN D ORAL) Take by mouth.      gabapentin (NEURONTIN) 600 MG tablet Take 1 tablet (600 mg total) by mouth 2 (two) times daily. 180 tablet 3    glimepiride (AMARYL) 4 MG tablet Take 1 tablet (4 mg total) by mouth 2 (two) times daily with meals. 180 tablet 3    HYDROcodone-acetaminophen (NORCO) 5-325 mg per tablet Take 1 tablet by mouth every 12 (twelve) hours as needed. 20 tablet 0    linaGLIPtin (TRADJENTA) 5 mg Tab tablet Take 1 tablet (5 mg total) by mouth once daily. 90 tablet 3    loperamide (IMODIUM A-D) 2 mg Tab Take 2 mg by mouth once daily.      LORazepam (ATIVAN) 0.5 MG tablet Take 0.5 mg by mouth every 6 (six) hours as needed for Anxiety.      omeprazole (PRILOSEC) 40 MG capsule Take 1 capsule (40 mg total) by mouth once daily. 90 capsule 3     Current Facility-Administered Medications   Medication Dose Route Frequency Provider Last Rate Last Dose    leuprolide (6 month) injection 45 mg  45 mg Intramuscular Q6 Months Catarino Mota MD        leuprolide (6 month) injection 45 mg  45 mg  "Intramuscular Q6 Months Catarino Mota MD   45 mg at 05/19/20 1112         ROS  Review of Systems   Constitutional: Negative for chills, diaphoresis, fever and unexpected weight change.   HENT: Negative for ear pain, sinus pain and sore throat.    Respiratory: Negative for cough, shortness of breath and wheezing.    Cardiovascular: Negative for chest pain and palpitations.   Gastrointestinal: Negative for blood in stool, diarrhea, nausea and vomiting.   Genitourinary: Negative for dysuria and hematuria.   Skin: Negative for rash and wound.   Neurological: Negative for seizures, syncope and headaches.   Psychiatric/Behavioral: Negative for dysphoric mood and sleep disturbance. The patient is not nervous/anxious.            PHYSICAL EXAM  Vitals:    08/11/20 1502 08/11/20 1547   BP: (!) 162/84 (!) 170/92   BP Location: Right arm Right arm   Patient Position: Sitting Sitting   BP Method: Medium (Manual) Medium (Manual)   Pulse: 83    Resp: 18    Temp: 98.3 °F (36.8 °C)    TempSrc: Oral    SpO2: 97%    Weight: 99.8 kg (220 lb)    Height: 6' 2" (1.88 m)     Body mass index is 28.25 kg/m².  Weight: 99.8 kg (220 lb)   Height: 6' 2" (188 cm)     Physical Exam  Constitutional:       Appearance: He is well-developed.   HENT:      Head: Normocephalic.      Right Ear: Tympanic membrane normal.      Left Ear: Tympanic membrane normal.      Mouth/Throat:      Pharynx: Uvula midline.   Eyes:      Conjunctiva/sclera: Conjunctivae normal.   Cardiovascular:      Rate and Rhythm: Normal rate and regular rhythm.      Pulses: Normal pulses.           Radial pulses are 2+ on the right side and 2+ on the left side.      Heart sounds: Normal heart sounds. No murmur.      Comments: No LE swelling noted  Pulmonary:      Effort: Pulmonary effort is normal.      Breath sounds: Normal breath sounds. No wheezing.   Abdominal:      General: Bowel sounds are normal.      Palpations: Abdomen is soft.      Tenderness: There is no abdominal " tenderness.   Lymphadenopathy:      Cervical: No cervical adenopathy.   Skin:     General: Skin is warm and dry.      Findings: No rash.   Neurological:      Mental Status: He is alert and oriented to person, place, and time.   Psychiatric:         Mood and Affect: Mood is depressed. Affect is labile and tearful.           Health Maintenance       Date Due Completion Date    High Dose Statin 03/25/1970 ---    Shingles Vaccine (3 of 3) 11/01/2019 9/6/2019    Eye Exam 02/20/2020 2/20/2019 (Done)    Override on 2/20/2019: Done (EYE MASTERS)    Override on 10/10/2017: Done    Foot Exam 04/08/2020 4/8/2019    Influenza Vaccine (1) 09/01/2020 9/6/2019    Hemoglobin A1c 10/20/2020 4/20/2020    Lipid Panel 04/20/2021 4/20/2020    Colorectal Cancer Screening 02/13/2029 2/13/2019    Override on 10/9/2017: Not Clinically Appropriate    TETANUS VACCINE 07/28/2030 7/28/2020            Assessment & Plan    Problem List Items Addressed This Visit        Unprioritized    Type 2 diabetes mellitus with stage 2 chronic kidney disease, without long-term current use of insulin - Primary    Relevant Orders    Ambulatory referral/consult to Endocrinology  Patient expresses interest in seeing endocrinology to clarify his DM regimen. Questionable episodes of hypoglycemia.       Essential hypertension  Check blood pressure once to twice daily. Instructed patient not to hold blood pressure medications for normal BP. Encouraged seeing cardiology as planned to clarify regimen. Upcoming appointment scheduled.      Chronic renal impairment  The current medical regimen is effective;  continue present plan and medications.      HLD (hyperlipidemia)  As managed by Dr. Dao.       Type 2 diabetes mellitus without complication, without long-term current use of insulin  Questionable episodes of hypoglycemia, patient is skipping meals. Will refer to joseph to help clarify and adjust his regimen.       Other Visit Diagnoses     Grief      Currently in  acute phase of grief, lost wife 5 weeks ago. Encouraged grief counseling, support groups. Patient with questionably weak support system. Not interested at this time. Will re-address at upcoming visit;           Follow-up: Follow up in about 2 weeks (around 8/25/2020).    Amina Baer    Medication List with Changes/Refills   Current Medications    ACCU-CHEK PAUL CONTROL SOLN SOLN    1 drop by NOT APPLICABLE route once daily.    ACCU-CHEK SOFTCLIX LANCETS MISC    1 lancet by NOT APPLICABLE route once daily.    ALLOPURINOL (ZYLOPRIM) 300 MG TABLET    Take 1.5 tablets (450 mg total) by mouth once daily.    AMLODIPINE (NORVASC) 2.5 MG TABLET    Take 2.5 tablets by mouth once daily.    ASPIRIN (ECOTRIN) 81 MG EC TABLET    Take 81 mg by mouth once daily.      BD ALCOHOL SWABS PADM        BLOOD SUGAR DIAGNOSTIC (ONE TOUCH ULTRA TEST) STRP    USE ONE STRIP TO CHECK GLUCOSE EVERY DAY    BRIMONIDINE 0.2% (ALPHAGAN) 0.2 % DROP    INSTILL 1 DROP INTO EACH EYE TWICE DAILY    CARVEDILOL (COREG) 6.25 MG TABLET        CLINDAMYCIN (CLEOCIN) 300 MG CAPSULE    Take 1 capsule (300 mg total) by mouth every 6 (six) hours.    CLONIDINE (CATAPRES) 0.1 MG TABLET    Take 0.5 mg by mouth once daily.    CLOPIDOGREL (PLAVIX) 75 MG TABLET    once daily.     ERGOCALCIFEROL, VITAMIN D2, (VITAMIN D ORAL)    Take by mouth.    GABAPENTIN (NEURONTIN) 600 MG TABLET    Take 1 tablet (600 mg total) by mouth 2 (two) times daily.    GLIMEPIRIDE (AMARYL) 4 MG TABLET    Take 1 tablet (4 mg total) by mouth 2 (two) times daily with meals.    HYDROCODONE-ACETAMINOPHEN (NORCO) 5-325 MG PER TABLET    Take 1 tablet by mouth every 12 (twelve) hours as needed.    LINAGLIPTIN (TRADJENTA) 5 MG TAB TABLET    Take 1 tablet (5 mg total) by mouth once daily.    LOPERAMIDE (IMODIUM A-D) 2 MG TAB    Take 2 mg by mouth once daily.    LORAZEPAM (ATIVAN) 0.5 MG TABLET    Take 0.5 mg by mouth every 6 (six) hours as needed for Anxiety.    OMEPRAZOLE (PRILOSEC) 40 MG CAPSULE     Take 1 capsule (40 mg total) by mouth once daily.   Discontinued Medications    CARVEDILOL (COREG) 3.125 MG TABLET    TAKE 1 TABLET BY MOUTH IN THE MORNING AND TWO TABLETS BY MOUTH AT BEDTIME    LISINOPRIL 10 MG TABLET        PANTOPRAZOLE (PROTONIX) 40 MG TABLET

## 2020-08-12 ENCOUNTER — PROCEDURE VISIT (OUTPATIENT)
Dept: DERMATOLOGY | Facility: CLINIC | Age: 70
End: 2020-08-12
Payer: MEDICARE

## 2020-08-12 VITALS
BODY MASS INDEX: 28.23 KG/M2 | HEART RATE: 68 BPM | HEIGHT: 74 IN | SYSTOLIC BLOOD PRESSURE: 142 MMHG | WEIGHT: 220 LBS | DIASTOLIC BLOOD PRESSURE: 90 MMHG

## 2020-08-12 DIAGNOSIS — C44.229 SQUAMOUS CELL CARCINOMA OF SKIN OF LEFT EAR: Primary | ICD-10-CM

## 2020-08-12 PROCEDURE — 13151 CMPLX RPR E/N/E/L 1.1-2.5 CM: CPT | Mod: 59,S$GLB,, | Performed by: DERMATOLOGY

## 2020-08-12 PROCEDURE — 17312: ICD-10-PCS | Mod: S$GLB,,, | Performed by: DERMATOLOGY

## 2020-08-12 PROCEDURE — 99499 NO LOS: ICD-10-PCS | Mod: S$GLB,,, | Performed by: DERMATOLOGY

## 2020-08-12 PROCEDURE — 13151 PR RECMPL WND LID,NOS,EAR 1.1-2.5 CM: ICD-10-PCS | Mod: 59,S$GLB,, | Performed by: DERMATOLOGY

## 2020-08-12 PROCEDURE — 17312 MOHS ADDL STAGE: CPT | Mod: S$GLB,,, | Performed by: DERMATOLOGY

## 2020-08-12 PROCEDURE — 17311 MOHS 1 STAGE H/N/HF/G: CPT | Mod: S$GLB,,, | Performed by: DERMATOLOGY

## 2020-08-12 PROCEDURE — 99499 UNLISTED E&M SERVICE: CPT | Mod: S$GLB,,, | Performed by: DERMATOLOGY

## 2020-08-12 PROCEDURE — 17311: ICD-10-PCS | Mod: S$GLB,,, | Performed by: DERMATOLOGY

## 2020-08-12 NOTE — PROGRESS NOTES
PROCEDURE: Mohs' Micrographic Surgery    INDICATION: Location in mask areas of face including central face, nose, eyelids, eyebrows, lips, chin, preauricular, temple, and ear. Biopsy-proven skin cancer of cosmetically and functionally important areas, including head, neck, genital, hand, foot, or areas known for having difficulty in healing, such as the lower anterior legs. Tumor with ill-defined borders.    REFERRING MD: Florencia Koch M.D.    CASE NUMBER:     ANESTHETIC: 2 cc 0.5% Lidocaine with Epi 1:200,000 mixed 1:1 with 0.5% Bupivacaine    SURGICAL PREP: Betadine    SURGEON: Maurice Kimball MD    ASSISTANTS: Ibis Rodriguez PA-C and Jadon Bowden, Surg Tech    PREOPERATIVE DIAGNOSIS: squamous cell carcinoma    POSTOPERATIVE DIAGNOSIS: squamous cell carcinoma    PATHOLOGIC DIAGNOSIS: squamous cell carcinoma- invasive    HISTOLOGY OF SPECIMENS IN FIRST STAGE:   Tumor Type: Tumor seen. Invasive squamous cell carcinoma: Proliferation of squamous cells exhibiting atypia and infiltrating within the dermis.   Depth of Invasion: subcutaneous tissue  Perineural Invasion: No    HISTOLOGY OF SPECIMENS IN SUBSEQUENT STAGES:  · Tumor Type: No tumor seen.    STAGES OF MOHS' SURGERY PERFORMED: 2    TUMOR-FREE PLANE ACHIEVED: Yes- with cartilage removal.    HEMOSTASIS: electrocoagulation     SPECIMENS: 3 (2 in stage A and 1 in stage B)    LOCATION: left superior helix. Patient verified location with hand held mirror.    INITIAL LESION SIZE: 0.4 x 0.4 cm    FINAL DEFECT SIZE: 0.5 x 1.1 cm    WOUND REPAIR/DISPOSITION: The patient tolerated Mohs' Micrographic Surgery for a squamous cell carcinoma very well. When the tumor was completely removed, a repair of the surgical defect was undertaken.       PROCEDURE: Complex Linear Repair    INDICATION: Status post Mohs' Micrographic Surgery for squamous cell carcinoma.    CASE NUMBER:     SURGEON: Maurice Kimball MD    ASSISTANTS: Ibis Rodriguez PA-C and Jadon Bowden, Surg  "Tech    ANESTHETIC: 1 cc 1% Lidocaine with Epinephrine 1:100,000    SURGICAL PREP: Betadine , prepped by Dakota Wadsworth    LOCATION: left superior helix    DEFECT SIZE: 0.5 x 1.1 cm    WOUND REPAIR/DISPOSITION:  After the patient's carcinoma had been completely removed with Mohs' Micrographic Surgery, a repair of the surgical defect was undertaken. The patient was returned to the operating suite where the area of left superior helix was prepped, draped, and anesthetized in the usual sterile fashion. The wound was widely undermined in all directions. Then, electrocoagulation was used to obtain meticulous hemostasis. 4-0 Vicryl buried vertical mattress sutures were placed into the subcutaneous and dermal plane to close the wound and adnial the cutaneous wound edge. Bilateral dog ears were identified and were removed by a standard Burow's triangle technique. The cutaneous wound edges were closed using interrupted 4-0 Prolene suture.    The patient tolerated the procedure well.    The area was cleaned and dressed appropriately and the patient was given wound care instructions, as well as appointment for follow-up evaluation and suture removal in one week.    LENGTH OF REPAIR: 2 cm    Vitals:    08/12/20 0743 08/12/20 0959   BP: (!) 151/94 (!) 142/90   Pulse: 74 68   Weight: 99.8 kg (220 lb)    Height: 6' 2" (1.88 m)          NOTE: Pt also with SCC Left forearm however pt fell and scraped entire dorsal left forearm just up to this biopsy site.  Will have to hold off on Mohs of this SCC Left forearm until the adjacent skin has healed.  Will have him keep moist and covered daily. Disc with patient.   "

## 2020-08-13 ENCOUNTER — TELEPHONE (OUTPATIENT)
Dept: DERMATOLOGY | Facility: CLINIC | Age: 70
End: 2020-08-13

## 2020-08-13 NOTE — TELEPHONE ENCOUNTER
Pharmacy request alternative due to listed medication fluorouraciL (TOLAK) on back order for the last 6 weeks.      Thanks,    JT

## 2020-08-13 NOTE — TELEPHONE ENCOUNTER
----- Message from Jerome Alvarez sent at 8/13/2020  9:55 AM CDT -----  Regarding: pharmacy  Please give Falguni at [facility] a call back at [phone]

## 2020-08-17 ENCOUNTER — TELEPHONE (OUTPATIENT)
Dept: DERMATOLOGY | Facility: CLINIC | Age: 70
End: 2020-08-17

## 2020-08-17 ENCOUNTER — PATIENT OUTREACH (OUTPATIENT)
Dept: ADMINISTRATIVE | Facility: OTHER | Age: 70
End: 2020-08-17

## 2020-08-17 NOTE — PROGRESS NOTES
LINKS immunization registry updated  Care Everywhere updated  Health Maintenance updated  Chart reviewed for overdue Proactive [facility] Encounters ([facility]) health maintenance testing (CRS, Breast Ca, Diabetic Eye Exam)   Orders entered:N/A

## 2020-08-17 NOTE — TELEPHONE ENCOUNTER
Called pt to confirm Mohs procedure for BCC R sideburn for Wednesday Aug. 19, 2020. Did Covid 19 screening, negative. Okay to proceed with Mohs as planned.

## 2020-08-18 ENCOUNTER — OFFICE VISIT (OUTPATIENT)
Dept: UROLOGY | Facility: CLINIC | Age: 70
End: 2020-08-18
Payer: MEDICARE

## 2020-08-18 VITALS
WEIGHT: 218 LBS | DIASTOLIC BLOOD PRESSURE: 85 MMHG | SYSTOLIC BLOOD PRESSURE: 129 MMHG | HEART RATE: 78 BPM | HEIGHT: 74 IN | BODY MASS INDEX: 27.98 KG/M2

## 2020-08-18 DIAGNOSIS — R97.20 ELEVATED PSA: ICD-10-CM

## 2020-08-18 DIAGNOSIS — C61 PROSTATE CANCER: Primary | ICD-10-CM

## 2020-08-18 PROCEDURE — 1100F PTFALLS ASSESS-DOCD GE2>/YR: CPT | Mod: CPTII,S$GLB,, | Performed by: UROLOGY

## 2020-08-18 PROCEDURE — 99999 PR PBB SHADOW E&M-EST. PATIENT-LVL V: ICD-10-PCS | Mod: PBBFAC,,, | Performed by: UROLOGY

## 2020-08-18 PROCEDURE — 3008F BODY MASS INDEX DOCD: CPT | Mod: CPTII,S$GLB,, | Performed by: UROLOGY

## 2020-08-18 PROCEDURE — 1126F AMNT PAIN NOTED NONE PRSNT: CPT | Mod: S$GLB,,, | Performed by: UROLOGY

## 2020-08-18 PROCEDURE — 3008F PR BODY MASS INDEX (BMI) DOCUMENTED: ICD-10-PCS | Mod: CPTII,S$GLB,, | Performed by: UROLOGY

## 2020-08-18 PROCEDURE — 3079F PR MOST RECENT DIASTOLIC BLOOD PRESSURE 80-89 MM HG: ICD-10-PCS | Mod: CPTII,S$GLB,, | Performed by: UROLOGY

## 2020-08-18 PROCEDURE — 99214 OFFICE O/P EST MOD 30 MIN: CPT | Mod: S$GLB,,, | Performed by: UROLOGY

## 2020-08-18 PROCEDURE — 1126F PR PAIN SEVERITY QUANTIFIED, NO PAIN PRESENT: ICD-10-PCS | Mod: S$GLB,,, | Performed by: UROLOGY

## 2020-08-18 PROCEDURE — 1100F PR PT FALLS ASSESS DOC 2+ FALLS/FALL W/INJURY/YR: ICD-10-PCS | Mod: CPTII,S$GLB,, | Performed by: UROLOGY

## 2020-08-18 PROCEDURE — 99214 PR OFFICE/OUTPT VISIT, EST, LEVL IV, 30-39 MIN: ICD-10-PCS | Mod: S$GLB,,, | Performed by: UROLOGY

## 2020-08-18 PROCEDURE — 1159F MED LIST DOCD IN RCRD: CPT | Mod: S$GLB,,, | Performed by: UROLOGY

## 2020-08-18 PROCEDURE — 3288F FALL RISK ASSESSMENT DOCD: CPT | Mod: CPTII,S$GLB,, | Performed by: UROLOGY

## 2020-08-18 PROCEDURE — 3288F PR FALLS RISK ASSESSMENT DOCUMENTED: ICD-10-PCS | Mod: CPTII,S$GLB,, | Performed by: UROLOGY

## 2020-08-18 PROCEDURE — 3074F PR MOST RECENT SYSTOLIC BLOOD PRESSURE < 130 MM HG: ICD-10-PCS | Mod: CPTII,S$GLB,, | Performed by: UROLOGY

## 2020-08-18 PROCEDURE — 3079F DIAST BP 80-89 MM HG: CPT | Mod: CPTII,S$GLB,, | Performed by: UROLOGY

## 2020-08-18 PROCEDURE — 1159F PR MEDICATION LIST DOCUMENTED IN MEDICAL RECORD: ICD-10-PCS | Mod: S$GLB,,, | Performed by: UROLOGY

## 2020-08-18 PROCEDURE — 3074F SYST BP LT 130 MM HG: CPT | Mod: CPTII,S$GLB,, | Performed by: UROLOGY

## 2020-08-18 PROCEDURE — 99999 PR PBB SHADOW E&M-EST. PATIENT-LVL V: CPT | Mod: PBBFAC,,, | Performed by: UROLOGY

## 2020-08-18 RX ORDER — ESCITALOPRAM OXALATE 10 MG/1
10 TABLET ORAL DAILY
COMMUNITY
Start: 2020-06-26 | End: 2021-04-07 | Stop reason: CLARIF

## 2020-08-18 NOTE — PROGRESS NOTES
CC: elevated PSA, suspected prostate cancer    Jarrett Charlton Jr. is a 70 y.o. man who is here for Follow-up    CC:  Elevated PSA     Jarrett Charlton Jr. is a 70 y.o. man who is here for the evaluation of elevated PSA.  He had Lupron injection treatment for suspected prostate cancer with rising PSA      a history of elevated PSA, negative TUR biopsy in January. History of APR for crohn's disease.  MRI- 15 ccs. PI-RADS 4, 1 cm lesion, right base PZ. Negative extra prostatic extension,NVB,SV nodes.     Hx of no rectum as well as concerning PIRADS lesion on MRI.   Had TURP for biopsying suspicious area      Date: 01/23/2019  Procedure(s) Performed:   TURP  Findings:   Open bladder neck some regrowth of adenoma and suspicious area in right mid prostate   Right side of prostate resected and sent for pathology  SPECIMEN  1) Right prostate chips.  FINAL PATHOLOGIC DIAGNOSIS  Right prostate gland, transurethral resection:  - Benign prostatic tissue with nodular stromal hyperplasia  - Negative for malignancy     Since TURP, he noted that he can void better with better urine flow.  Blood in urine resolved.  However, he reports Occasional ZEB but it got all better.  No more ZEB reported.  He is here with another MRI of prostate following TURP because of still rising PSA up to 6.3 from 4.1.     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  He was not interested in SUDS.  S/p colon surgery and his anus is closed.  Therefore dong TRUS bx of prostate is not feasible.  After discussion, we decided to try finasteride to see whether it will lower his PSA.   He has been on finasteride for the past couple of years. Following TURP in 1/2019, we decided to stop taking finasteride.     When he was considered to undergo TURP, we could not do his surgery because he was not able to stop ASA or Plavix due to fresh vascular stent.  He got a clearance that  he can hold off plavix and ASA prior to TURP.  Hx of ulcerative colitis, had colon surgery back in 1985 and his anus was closed.  Has a neuropathy on the right leg.  Hx of sciatic nerve on both sides and received injection on the back.  Hx of diabetes diagnosed 2 years ago.  No family hx of prostate cancer.  He is a .  Denies flank pain, dysuira, hematuria .       MRI of prostate 8/20/19  Previous biopsy: Negative on 01/23/2019  PSA: 6.3 ng/mL (08/16/2019)  Prior therapy: TURP    Prostate: 3.6 x 3.5 x 3.0 cm corresponding to a computed volume of 15.2 cc.    Peripheral zone: Focal abnormalities with imaging features concerning for prostate cancer.  Lesion (MARYCARMEN) #1  Location: Side: right; Region: base; Zone: posterior peripheral zone laterally  Greatest dimension: 1.0 cm  T2-WI: T2 SI: circumscribed, homogeneous moderate hypointensity--score 4 or 5  DWI/ADC: DWI: Focal markedly hypointense on ADC and markedly hyperintense on high b-value DWI--score 4 or 5  DCE: Positive  Extraprostatic extension: No gross extraprostatic extension noting postsurgical changes limits evaluation.    PI-RADS assessment category: 4    Transitional zone: Mostly surgically resected.  Neurovascular bundle: Normal.  Seminal vesicles:  SV invasion: None  Adjacent Organ Involvement: No focal bladder wall thickening.  No rectal involvement.  Lymphadenopathy: None     On 11/22/19, pt underwent perineal biopsy by IR for CT guided needle bx for the right posterolateral prostate lesion;  Elevated prostate specific antigen (PSA)  Pathology:  BIOPSY OF PROSTATE BED:  FIBROFATTY TISSUE WITH NO NEOPLASIA IDENTIFIED     Pt had his PSA repeated and is now elevated to 7.9.  He is anxious about possible prostate cancer and would like to to something about it.    Past Medical History:   Diagnosis Date    Basal cell carcinoma     BPH (benign prostatic hyperplasia)     s/p TURP    Carotid stenosis     Chronic kidney disease     Claudication      Coronary artery disease     DDD (degenerative disc disease) 10/21/2013    Diabetes mellitus with renal complications     Disc disease, degenerative, cervical     Encounter for blood transfusion     GERD (gastroesophageal reflux disease)     Gout, chronic     History of ulcerative colitis     s/p colectomy and ileostomy    HLD (hyperlipidemia)     HTN (hypertension)     Ileostomy in place 1982    RBBB     Squamous cell carcinoma 03/08/2018    Left superior helix near insertion    Squamous cell carcinoma 04/12/2018    Left forearm x 5    Ventricular tachycardia      Past Surgical History:   Procedure Laterality Date    cardiac stents      CATARACT EXTRACTION Bilateral     CERVICAL FUSION      colectomy and ileostomy  1985    TRANSURETHRAL RESECTION OF PROSTATE (TURP) WITHOUT USE OF LASER N/A 1/23/2019    Procedure: TURP, WITHOUT USING LASER BIPOLAR;  Surgeon: Catarino Mota MD;  Location: [facility] OR [address] [place] [address];  Service: Urology;  Laterality: N/A;  1.5 HOURS     Social History     Tobacco Use    Smoking status: Never Smoker    Smokeless tobacco: Never Used   Substance Use Topics    Alcohol use: No    Drug use: No     Family History   Problem Relation Age of Onset    Cancer Father     Diabetes Father     Dementia Mother     Melanoma Neg Hx     Hypertension Neg Hx     Arthritis Neg Hx      Allergy:  Review of patient's allergies indicates:  No Known Allergies  Outpatient Encounter Medications as of 8/18/2020   Medication Sig Dispense Refill    ACCU-CHEK PAUL CONTROL SOLN Soln 1 drop by NOT APPLICABLE route once daily.      ACCU-CHEK SOFTCLIX LANCETS Misc 1 lancet by NOT APPLICABLE route once daily.      allopurinoL (ZYLOPRIM) 300 MG tablet Take 1.5 tablets (450 mg total) by mouth once daily. 135 tablet 3    amLODIPine (NORVASC) 2.5 MG tablet Take 2.5 tablets by mouth once daily.      aspirin (ECOTRIN) 81 MG EC tablet Take 81 mg by mouth once daily.        BD ALCOHOL SWABS PadM       blood  sugar diagnostic (ONE TOUCH ULTRA TEST) Strp USE ONE STRIP TO CHECK GLUCOSE EVERY  each 11    brimonidine 0.2% (ALPHAGAN) 0.2 % Drop INSTILL 1 DROP INTO EACH EYE TWICE DAILY  4    carvediloL (COREG) 6.25 MG tablet       clindamycin (CLEOCIN) 300 MG capsule Take 1 capsule (300 mg total) by mouth every 6 (six) hours. 40 capsule 0    cloNIDine (CATAPRES) 0.1 MG tablet Take 0.5 mg by mouth once daily.      clopidogrel (PLAVIX) 75 mg tablet once daily.       ergocalciferol, vitamin D2, (VITAMIN D ORAL) Take by mouth.      escitalopram oxalate (LEXAPRO) 10 MG tablet Take 10 mg by mouth once daily.      gabapentin (NEURONTIN) 600 MG tablet Take 1 tablet (600 mg total) by mouth 2 (two) times daily. 180 tablet 3    glimepiride (AMARYL) 4 MG tablet Take 1 tablet (4 mg total) by mouth 2 (two) times daily with meals. 180 tablet 3    HYDROcodone-acetaminophen (NORCO) 5-325 mg per tablet Take 1 tablet by mouth every 12 (twelve) hours as needed. 20 tablet 0    linaGLIPtin (TRADJENTA) 5 mg Tab tablet Take 1 tablet (5 mg total) by mouth once daily. 90 tablet 3    loperamide (IMODIUM A-D) 2 mg Tab Take 2 mg by mouth once daily.      LORazepam (ATIVAN) 0.5 MG tablet Take 0.5 mg by mouth every 6 (six) hours as needed for Anxiety.      omeprazole (PRILOSEC) 40 MG capsule Take 1 capsule (40 mg total) by mouth once daily. 90 capsule 3     Facility-Administered Encounter Medications as of 8/18/2020   Medication Dose Route Frequency Provider Last Rate Last Dose    leuprolide (6 month) injection 45 mg  45 mg Intramuscular Q6 Months Catarino Mota MD        leuprolide (6 month) injection 45 mg  45 mg Intramuscular Q6 Months Catarino Mota MD   45 mg at 05/19/20 1112    leuprolide (6 month) injection 45 mg  45 mg Intramuscular Q6 Months Catarino Mota MD         Review of Systems   ROS  Physical Exam     Vitals:    08/18/20 1026   BP: 129/85   Pulse: 78     Physical Exam  Genitalia:  Scrotum: no rash or lesion  Normal  symmetric epididymis without masses  Normal vas palpated  Normal size, symmetric testicles with no masses   Normal urethral meatus with no discharge  Normal circumcised penis with no lesion   Rectal:  Normal perineum and anus upon inspection.  Normal tone, no masses or tenderness;     LABS:  Lab Results   Component Value Date    PSA 7.9 (H) 04/20/2020    PSA 2.1 09/15/2014    PSA 2.16 05/13/2013    PSA 1.2 03/19/2012    PSA 1.0 02/19/2010    PSA 1.4 02/17/2009    PSA 1.1 04/30/2007    PSA 1.0 05/12/2006    PSA 1.0 04/22/2005    PSADIAG 0.54 08/11/2020    PSADIAG 6.3 (H) 08/16/2019    PSADIAG 4.1 (H) 11/21/2018    PSADIAG 3.3 06/15/2018    PSADIAG 3.1 05/08/2018    PSADIAG 1.7 06/05/2017    PSADIAG 1.4 06/06/2016    PSADIAG 2.1 10/16/2015    PSADIAG 4.3 (H) 06/19/2015     Results for orders placed or performed in visit on 08/11/20   Prostate Specific Antigen, Diagnostic   Result Value Ref Range    PSA DIAGNOSTIC 0.54 0.00 - 4.00 ng/mL   Results for orders placed or performed in visit on 08/16/19   Prostate Specific Antigen, Diagnostic   Result Value Ref Range    PSA DIAGNOSTIC 6.3 (H) 0.00 - 4.00 ng/mL   Results for orders placed or performed in visit on 11/21/18   Prostate Specific Antigen, Diagnostic   Result Value Ref Range    PSA DIAGNOSTIC 4.1 (H) 0.00 - 4.00 ng/mL     Lab Results   Component Value Date    CREATININE 1.4 07/28/2020    CREATININE 1.6 (H) 07/21/2020    CREATININE 1.4 04/20/2020     No results found for this or any previous visit.  Urine Culture, Routine   Date Value Ref Range Status   02/20/2019 No significant growth  Final     Hemoglobin A1C   Date Value Ref Range Status   04/20/2020 6.7 (H) 4.0 - 5.6 % Final     Comment:     ADA Screening Guidelines:  5.7-6.4%  Consistent with prediabetes  >or=6.5%  Consistent with diabetes  High levels of fetal hemoglobin interfere with the HbA1C  assay. Heterozygous hemoglobin variants (HbS, HgC, etc)do  not significantly interfere with this assay.   However,  presence of multiple variants may affect accuracy.     02/10/2020 7.0 (H) 4.0 - 5.6 % Final     Comment:     ADA Screening Guidelines:  5.7-6.4%  Consistent with prediabetes  >or=6.5%  Consistent with diabetes  High levels of fetal hemoglobin interfere with the HbA1C  assay. Heterozygous hemoglobin variants (HbS, HgC, etc)do  not significantly interfere with this assay.   However, presence of multiple variants may affect accuracy.         Radiology:    Assessment and Plan:  Jarrett was seen today for follow-up.    Diagnoses and all orders for this visit:    Prostate cancer  -     Prostate Specific Antigen, Diagnostic; Future  -     Testosterone; Future  -     leuprolide (6 month) injection 45 mg  -     Prior authorization Order    Elevated PSA    his PSA is markedly down since Lupron injection 3 months ago.  Will check his PSA and testosterone level in 3 months.  Continuous ADT vs. Intermittent ADT using Lupron injection.  Pt prefers to continue Lupron injection  I spent 25 minutes with the patient of which more than half was spent in direct consultation with the patient in regards to our treatment and plan.      Follow-up:  Follow up for PSA, Testosterone.

## 2020-08-18 NOTE — PATIENT INSTRUCTIONS
Lab Results   Component Value Date    PSA 7.9 (H) 04/20/2020    PSA 2.1 09/15/2014    PSA 2.16 05/13/2013    PSADIAG 0.54 08/11/2020    PSADIAG 6.3 (H) 08/16/2019    PSADIAG 4.1 (H) 11/21/2018

## 2020-08-19 ENCOUNTER — PROCEDURE VISIT (OUTPATIENT)
Dept: DERMATOLOGY | Facility: CLINIC | Age: 70
End: 2020-08-19
Payer: MEDICARE

## 2020-08-19 VITALS
BODY MASS INDEX: 27.98 KG/M2 | WEIGHT: 218 LBS | HEART RATE: 83 BPM | HEIGHT: 74 IN | DIASTOLIC BLOOD PRESSURE: 96 MMHG | SYSTOLIC BLOOD PRESSURE: 158 MMHG

## 2020-08-19 DIAGNOSIS — C44.319 BASAL CELL CARCINOMA OF RIGHT PREAURICULAR REGION: Primary | ICD-10-CM

## 2020-08-19 PROCEDURE — 99499 NO LOS: ICD-10-PCS | Mod: S$GLB,,, | Performed by: DERMATOLOGY

## 2020-08-19 PROCEDURE — 17311: ICD-10-PCS | Mod: 79,51,S$GLB, | Performed by: DERMATOLOGY

## 2020-08-19 PROCEDURE — 13132 PR RECMPL WND HEAD,FAC,HAND 2.6-7.5 CM: ICD-10-PCS | Mod: 79,S$GLB,, | Performed by: DERMATOLOGY

## 2020-08-19 PROCEDURE — 99499 UNLISTED E&M SERVICE: CPT | Mod: S$GLB,,, | Performed by: DERMATOLOGY

## 2020-08-19 PROCEDURE — 17312: ICD-10-PCS | Mod: S$GLB,,, | Performed by: DERMATOLOGY

## 2020-08-19 PROCEDURE — 17311 MOHS 1 STAGE H/N/HF/G: CPT | Mod: 79,51,S$GLB, | Performed by: DERMATOLOGY

## 2020-08-19 PROCEDURE — 13132 CMPLX RPR F/C/C/M/N/AX/G/H/F: CPT | Mod: 79,S$GLB,, | Performed by: DERMATOLOGY

## 2020-08-19 PROCEDURE — 17312 MOHS ADDL STAGE: CPT | Mod: S$GLB,,, | Performed by: DERMATOLOGY

## 2020-08-19 RX ORDER — HYDROCODONE BITARTRATE AND ACETAMINOPHEN 5; 325 MG/1; MG/1
1 TABLET ORAL EVERY 6 HOURS PRN
Qty: 10 TABLET | Refills: 0 | Status: SHIPPED | OUTPATIENT
Start: 2020-08-19 | End: 2020-10-02

## 2020-08-19 NOTE — PROGRESS NOTES
70 y.o. male patient is here for suture removal following Mohs' surgery.    Patient reports no problems.    WOUND PE:  The L superior helix sutures intact. Wound healing well. Good skin edges. No signs or symptoms of infection.    IMPRESSION:  Healing operative site from Mohs' surgery, SCC L superior helix s/p Mohs with CLC, postop day #7.    PLAN:  Sutures removed today. Steri-strips applied.  Continue wound care.  Keep moist with Aquaphor.    RTC:  In 1 week for suture removal on R sideburn s/p Mohs for BCC.

## 2020-08-19 NOTE — PROGRESS NOTES
PROCEDURE: Mohs' Micrographic Surgery    INDICATION: Location in mask areas of face including central face, nose, eyelids, eyebrows, lips, chin, preauricular, temple, and ear.    REFERRING MD: Florencia Koch M.D.    CASE NUMBER:     ANESTHETIC: 4.5 cc 0.5% Lidocaine with Epi 1:200,000 mixed 1:1 with 0.5% Bupivacaine    SURGICAL PREP: Hibiclens    SURGEON: Maurice Kimball MD    ASSISTANTS: Ibis Rodriguez PA-C and Julisa Calderón MA    PREOPERATIVE DIAGNOSIS: basal cell carcinoma    POSTOPERATIVE DIAGNOSIS: basal cell carcinoma    PATHOLOGIC DIAGNOSIS: basal cell carcinoma- superficial, nodular    HISTOLOGY OF SPECIMENS IN FIRST STAGE:   Tumor Type: Tumor seen. Superficial basal cell carcinoma: Foci of basaloid cells with peripheral palisading and focal retraction artifact arising along the dermoepidermal junction and extending into the papillary dermis.  Nodular basal cell carcinoma: Nodular tumor in dermis composed of basaloid cells exhibiting peripheral palisading and retraction artifact.   Depth of Invasion: epidermis, dermis and subcutaneous tissue  Perineural Invasion: No    HISTOLOGY OF SPECIMENS IN SUBSEQUENT STAGES:  · Tumor Type: No tumor seen.    STAGES OF MOHS' SURGERY PERFORMED: 2    TUMOR-FREE PLANE ACHIEVED: Yes    HEMOSTASIS: electrocoagulation     SPECIMENS: 4 (2 in stage A and 2 in stage B)    LOCATION: right sideburn. Patient verified location with hand held mirror.    INITIAL LESION SIZE: 0.7 x 0.7 cm    FINAL DEFECT SIZE: 1.2 x 1.2 cm    WOUND REPAIR/DISPOSITION: The patient tolerated Mohs' Micrographic Surgery for a basal cell carcinoma very well. When the tumor was completely removed, a repair of the surgical defect was undertaken.      PROCEDURE: Complex Linear Repair    INDICATION: Status post Mohs' Micrographic Surgery for basal cell carcinoma.    CASE NUMBER:     SURGEON: Maurice Kimball MD    ASSISTANTS: Ibis Rodriguez PA-C and Kayley Barahona, Surg Tech    ANESTHETIC: 3 cc 1%  "Lidocaine with Epinephrine 1:100,000    SURGICAL PREP: Hibiclens, prepped by Kayley Barahona, Surg Tech    LOCATION: right sideburn    DEFECT SIZE: 1.2 x 1.2 cm    WOUND REPAIR/DISPOSITION:  After the patient's carcinoma had been completely removed with Mohs' Micrographic Surgery, a repair of the surgical defect was undertaken. The patient was returned to the operating suite where the area of right sideburn was prepped, draped, and anesthetized in the usual sterile fashion. The wound was widely undermined in all directions. Then, electrocoagulation was used to obtain meticulous hemostasis. 5-0 Vicryl buried vertical mattress sutures were placed into the subcutaneous and dermal plane to close the wound and danial the cutaneous wound edge. Bilateral dog ears were identified and were removed by a standard Burow's triangle technique. The cutaneous wound edges were closed using interrupted 5-0 Prolene suture.    The patient tolerated the procedure well.    The area was cleaned and dressed appropriately and the patient was given wound care instructions, as well as appointment for follow-up evaluation and suture removal in one week. Patient was placed on Norco 5-325 mg prn postop pain.     LENGTH OF REPAIR: 2.7 cm    Vitals:    08/19/20 1135 08/19/20 1319   BP: (!) 156/99 (!) 158/96   BP Location: Left arm Right arm   Patient Position: Sitting Sitting   BP Method: Small (Automatic) Small (Automatic)   Pulse: 82 83   Weight: 98.9 kg (218 lb)    Height: 6' 2" (1.88 m)          "

## 2020-08-24 ENCOUNTER — OFFICE VISIT (OUTPATIENT)
Dept: PRIMARY CARE CLINIC | Facility: CLINIC | Age: 70
End: 2020-08-24
Payer: MEDICARE

## 2020-08-24 VITALS
RESPIRATION RATE: 18 BRPM | WEIGHT: 216 LBS | DIASTOLIC BLOOD PRESSURE: 64 MMHG | SYSTOLIC BLOOD PRESSURE: 128 MMHG | HEART RATE: 89 BPM | HEIGHT: 74 IN | TEMPERATURE: 99 F | OXYGEN SATURATION: 98 % | BODY MASS INDEX: 27.72 KG/M2

## 2020-08-24 DIAGNOSIS — H93.13 TINNITUS, BILATERAL: Primary | ICD-10-CM

## 2020-08-24 DIAGNOSIS — H93.13 TINNITUS, BILATERAL: ICD-10-CM

## 2020-08-24 PROCEDURE — 1159F MED LIST DOCD IN RCRD: CPT | Mod: S$GLB,,, | Performed by: INTERNAL MEDICINE

## 2020-08-24 PROCEDURE — 3078F PR MOST RECENT DIASTOLIC BLOOD PRESSURE < 80 MM HG: ICD-10-PCS | Mod: CPTII,S$GLB,, | Performed by: INTERNAL MEDICINE

## 2020-08-24 PROCEDURE — 99999 PR PBB SHADOW E&M-EST. PATIENT-LVL V: CPT | Mod: PBBFAC,,, | Performed by: INTERNAL MEDICINE

## 2020-08-24 PROCEDURE — 1101F PT FALLS ASSESS-DOCD LE1/YR: CPT | Mod: CPTII,S$GLB,, | Performed by: INTERNAL MEDICINE

## 2020-08-24 PROCEDURE — 3074F SYST BP LT 130 MM HG: CPT | Mod: CPTII,S$GLB,, | Performed by: INTERNAL MEDICINE

## 2020-08-24 PROCEDURE — 3008F BODY MASS INDEX DOCD: CPT | Mod: CPTII,S$GLB,, | Performed by: INTERNAL MEDICINE

## 2020-08-24 PROCEDURE — 1159F PR MEDICATION LIST DOCUMENTED IN MEDICAL RECORD: ICD-10-PCS | Mod: S$GLB,,, | Performed by: INTERNAL MEDICINE

## 2020-08-24 PROCEDURE — 99213 OFFICE O/P EST LOW 20 MIN: CPT | Mod: S$GLB,,, | Performed by: INTERNAL MEDICINE

## 2020-08-24 PROCEDURE — 99999 PR PBB SHADOW E&M-EST. PATIENT-LVL V: ICD-10-PCS | Mod: PBBFAC,,, | Performed by: INTERNAL MEDICINE

## 2020-08-24 PROCEDURE — 3074F PR MOST RECENT SYSTOLIC BLOOD PRESSURE < 130 MM HG: ICD-10-PCS | Mod: CPTII,S$GLB,, | Performed by: INTERNAL MEDICINE

## 2020-08-24 PROCEDURE — 1126F PR PAIN SEVERITY QUANTIFIED, NO PAIN PRESENT: ICD-10-PCS | Mod: S$GLB,,, | Performed by: INTERNAL MEDICINE

## 2020-08-24 PROCEDURE — 3008F PR BODY MASS INDEX (BMI) DOCUMENTED: ICD-10-PCS | Mod: CPTII,S$GLB,, | Performed by: INTERNAL MEDICINE

## 2020-08-24 PROCEDURE — 3078F DIAST BP <80 MM HG: CPT | Mod: CPTII,S$GLB,, | Performed by: INTERNAL MEDICINE

## 2020-08-24 PROCEDURE — 99213 PR OFFICE/OUTPT VISIT, EST, LEVL III, 20-29 MIN: ICD-10-PCS | Mod: S$GLB,,, | Performed by: INTERNAL MEDICINE

## 2020-08-24 PROCEDURE — 1101F PR PT FALLS ASSESS DOC 0-1 FALLS W/OUT INJ PAST YR: ICD-10-PCS | Mod: CPTII,S$GLB,, | Performed by: INTERNAL MEDICINE

## 2020-08-24 PROCEDURE — 1126F AMNT PAIN NOTED NONE PRSNT: CPT | Mod: S$GLB,,, | Performed by: INTERNAL MEDICINE

## 2020-08-24 RX ORDER — ALPRAZOLAM 0.5 MG/1
0.5 TABLET ORAL 2 TIMES DAILY PRN
Qty: 30 TABLET | Refills: 0 | Status: SHIPPED | OUTPATIENT
Start: 2020-08-24 | End: 2020-08-24 | Stop reason: SDUPTHER

## 2020-08-24 RX ORDER — ALPRAZOLAM 0.5 MG/1
0.5 TABLET ORAL 2 TIMES DAILY PRN
Qty: 30 TABLET | Refills: 0 | Status: SHIPPED | OUTPATIENT
Start: 2020-08-24 | End: 2020-11-03

## 2020-08-24 RX ORDER — AMITRIPTYLINE HYDROCHLORIDE 25 MG/1
50 TABLET, FILM COATED ORAL NIGHTLY
Qty: 30 TABLET | Refills: 1 | Status: SHIPPED | OUTPATIENT
Start: 2020-08-24 | End: 2021-04-01 | Stop reason: CLARIF

## 2020-08-24 RX ORDER — AMITRIPTYLINE HYDROCHLORIDE 25 MG/1
50 TABLET, FILM COATED ORAL NIGHTLY
Qty: 30 TABLET | Refills: 1 | Status: SHIPPED | OUTPATIENT
Start: 2020-08-24 | End: 2020-08-24 | Stop reason: SDUPTHER

## 2020-08-24 NOTE — PROGRESS NOTES
Subjective:       Patient ID: Jarrett Charlton Jr. is a 70 y.o. male.    Chief Complaint: Tinnitus (bilateral)    HPI Pt c/o ringing in ears bilaterally has been for a while  Used to be able to control it with noise but now louder and has been inceasing in intensity and attacks he denies fever chill sorethroat no n/v/d diarrhea from colostomy better with imodium and wounds left forearm healing well no siob cp no fever chill no n/v/d  Review of Systems    Objective:      Physical Exam  Vitals signs and nursing note reviewed.   Constitutional:       General: He is not in acute distress.     Appearance: He is well-developed.   HENT:      Head: Normocephalic and atraumatic.      Right Ear: Tympanic membrane, ear canal and external ear normal.      Left Ear: Tympanic membrane, ear canal and external ear normal.      Nose: Nose normal.      Mouth/Throat:      Pharynx: No oropharyngeal exudate.   Eyes:      Extraocular Movements: Extraocular movements intact.      Conjunctiva/sclera: Conjunctivae normal.      Pupils: Pupils are equal, round, and reactive to light.   Neck:      Musculoskeletal: Normal range of motion and neck supple.      Thyroid: No thyromegaly.   Cardiovascular:      Rate and Rhythm: Normal rate and regular rhythm.      Heart sounds: Normal heart sounds. No murmur. No friction rub. No gallop.    Pulmonary:      Effort: Pulmonary effort is normal. No respiratory distress.      Breath sounds: Normal breath sounds. No wheezing or rales.   Abdominal:      General: Bowel sounds are normal. There is no distension.      Palpations: Abdomen is soft.      Tenderness: There is no abdominal tenderness. There is no guarding.   Musculoskeletal: Normal range of motion.         General: Tenderness present. No deformity.   Lymphadenopathy:      Cervical: No cervical adenopathy.   Skin:     General: Skin is warm and dry.      Capillary Refill: Capillary refill takes less than 2 seconds.      Findings: No erythema or rash.    Neurological:      General: No focal deficit present.      Mental Status: He is alert and oriented to person, place, and time.   Psychiatric:         Mood and Affect: Mood normal.         Thought Content: Thought content normal.         Judgment: Judgment normal.         Assessment:       1. Tinnitus, bilateral        Plan:       Tinnitus, bilateral  Comments:  will try xanax prn and elavil qhs if not better consider ENT consult and labs  Orders:  -     amitriptyline (ELAVIL) 25 MG tablet; Take 2 tablets (50 mg total) by mouth every evening.  Dispense: 30 tablet; Refill: 1  -     ALPRAZolam (XANAX) 0.5 MG tablet; Take 1 tablet (0.5 mg total) by mouth 2 (two) times daily as needed (tinnitis).  Dispense: 30 tablet; Refill: 0

## 2020-08-24 NOTE — TELEPHONE ENCOUNTER
----- Message from Jess Silverio sent at 8/24/2020  2:05 PM CDT -----  Type: Needs Medical Advice    Who Called:  Jarrett, patient  Symptoms (please be specific):  N/A  How long has patient had these symptoms:  N/A  Pharmacy name and phone #:    [facility]  Best Call Back Number: [phone]  Additional Information: Was seen this morning, was supposed to get prescriptions. Please advise. Thanks.

## 2020-08-24 NOTE — TELEPHONE ENCOUNTER
Medications need to go to the local pharmacy not [facility]. Called [facility] to cancel 2 rx's for Amitriptyline and Xanax. Need to be sent to [facility] pharmacy [facility] in [place]

## 2020-08-26 ENCOUNTER — OFFICE VISIT (OUTPATIENT)
Dept: DERMATOLOGY | Facility: CLINIC | Age: 70
End: 2020-08-26
Payer: MEDICARE

## 2020-08-26 DIAGNOSIS — Z09 POSTOP CHECK: Primary | ICD-10-CM

## 2020-08-26 PROCEDURE — 99024 POSTOP FOLLOW-UP VISIT: CPT | Mod: S$GLB,,, | Performed by: DERMATOLOGY

## 2020-08-26 PROCEDURE — 99024 PR POST-OP FOLLOW-UP VISIT: ICD-10-PCS | Mod: S$GLB,,, | Performed by: DERMATOLOGY

## 2020-08-26 NOTE — PROGRESS NOTES
70 y.o. male patient is here for suture removal following Mohs' surgery.    Patient reports no problems with right sideburn.    WOUND PE:  The right sideburn sutures intact. Wound healing well. Good skin edges. No signs or symptoms of infection.    IMPRESSION:  Healing operative site from Mohs' surgery BCC, right sideburn s/p Mohs with CLC, postop day # 7.    PLAN:  Sutures removed today. Steri-strips applied.  Continue wound care.  Keep moist with Aquaphor.    Pt still with SCC Left forearm - cannot do Mohs as his arm is still healing from fall, left forearm wound without complete re-epithelialization yet.   Advised pt to send us photo of left arm in 1 month and will see if we can schedule it at that time, pending his wound healing.     RTC:  To be determined Mohs date left forearm.

## 2020-09-01 ENCOUNTER — OFFICE VISIT (OUTPATIENT)
Dept: DERMATOLOGY | Facility: CLINIC | Age: 70
End: 2020-09-01
Payer: MEDICARE

## 2020-09-01 DIAGNOSIS — D48.5 NEOPLASM OF UNCERTAIN BEHAVIOR OF SKIN: Primary | ICD-10-CM

## 2020-09-01 DIAGNOSIS — Z85.828 HISTORY OF NONMELANOMA SKIN CANCER: ICD-10-CM

## 2020-09-01 DIAGNOSIS — L57.0 MULTIPLE ACTINIC KERATOSES: ICD-10-CM

## 2020-09-01 PROCEDURE — 1101F PT FALLS ASSESS-DOCD LE1/YR: CPT | Mod: CPTII,S$GLB,, | Performed by: PATHOLOGY

## 2020-09-01 PROCEDURE — 17003 DESTRUCTION, PREMALIGNANT LESIONS; SECOND THROUGH 14 LESIONS: ICD-10-PCS | Mod: S$GLB,,, | Performed by: PATHOLOGY

## 2020-09-01 PROCEDURE — 11102 TANGNTL BX SKIN SINGLE LES: CPT | Mod: S$GLB,,, | Performed by: PATHOLOGY

## 2020-09-01 PROCEDURE — 88305 TISSUE EXAM BY PATHOLOGIST: CPT | Mod: 59 | Performed by: PATHOLOGY

## 2020-09-01 PROCEDURE — 11103 TANGNTL BX SKIN EA SEP/ADDL: CPT | Mod: S$GLB,,, | Performed by: PATHOLOGY

## 2020-09-01 PROCEDURE — 1126F PR PAIN SEVERITY QUANTIFIED, NO PAIN PRESENT: ICD-10-PCS | Mod: S$GLB,,, | Performed by: PATHOLOGY

## 2020-09-01 PROCEDURE — 17000 PR DESTRUCTION(LASER SURGERY,CRYOSURGERY,CHEMOSURGERY),PREMALIGNANT LESIONS,FIRST LESION: ICD-10-PCS | Mod: 59,S$GLB,, | Performed by: PATHOLOGY

## 2020-09-01 PROCEDURE — 11103 PR TANGENTIAL BIOPSY, SKIN, EA ADDTL LESION: ICD-10-PCS | Mod: S$GLB,,, | Performed by: PATHOLOGY

## 2020-09-01 PROCEDURE — 1159F PR MEDICATION LIST DOCUMENTED IN MEDICAL RECORD: ICD-10-PCS | Mod: S$GLB,,, | Performed by: PATHOLOGY

## 2020-09-01 PROCEDURE — 99999 PR PBB SHADOW E&M-EST. PATIENT-LVL IV: CPT | Mod: PBBFAC,,, | Performed by: PATHOLOGY

## 2020-09-01 PROCEDURE — 17003 DESTRUCT PREMALG LES 2-14: CPT | Mod: S$GLB,,, | Performed by: PATHOLOGY

## 2020-09-01 PROCEDURE — 1126F AMNT PAIN NOTED NONE PRSNT: CPT | Mod: S$GLB,,, | Performed by: PATHOLOGY

## 2020-09-01 PROCEDURE — 88305 TISSUE EXAM BY PATHOLOGIST: CPT | Mod: 26,,, | Performed by: PATHOLOGY

## 2020-09-01 PROCEDURE — 17000 DESTRUCT PREMALG LESION: CPT | Mod: 59,S$GLB,, | Performed by: PATHOLOGY

## 2020-09-01 PROCEDURE — 1101F PR PT FALLS ASSESS DOC 0-1 FALLS W/OUT INJ PAST YR: ICD-10-PCS | Mod: CPTII,S$GLB,, | Performed by: PATHOLOGY

## 2020-09-01 PROCEDURE — 99213 PR OFFICE/OUTPT VISIT, EST, LEVL III, 20-29 MIN: ICD-10-PCS | Mod: 25,S$GLB,, | Performed by: PATHOLOGY

## 2020-09-01 PROCEDURE — 99999 PR PBB SHADOW E&M-EST. PATIENT-LVL IV: ICD-10-PCS | Mod: PBBFAC,,, | Performed by: PATHOLOGY

## 2020-09-01 PROCEDURE — 88305 TISSUE EXAM BY PATHOLOGIST: ICD-10-PCS | Mod: 26,,, | Performed by: PATHOLOGY

## 2020-09-01 PROCEDURE — 1159F MED LIST DOCD IN RCRD: CPT | Mod: S$GLB,,, | Performed by: PATHOLOGY

## 2020-09-01 PROCEDURE — 11102 PR TANGENTIAL BIOPSY, SKIN, SINGLE LESION: ICD-10-PCS | Mod: S$GLB,,, | Performed by: PATHOLOGY

## 2020-09-01 PROCEDURE — 99213 OFFICE O/P EST LOW 20 MIN: CPT | Mod: 25,S$GLB,, | Performed by: PATHOLOGY

## 2020-09-01 NOTE — PROGRESS NOTES
Subjective:       Patient ID:  Jarrett Charlton Jr. is a 70 y.o. male who presents for   Chief Complaint   Patient presents with    Skin Check     Pt presents today for f/u Mohs     HPI Pt presents today for follow up after Mohs sx  BCC On R [place]  SCC on L superior [place]   Pt also with SCC Left forearm however pt fell and scraped entire dorsal left forearm just up to this biopsy site. He will go back to  once healed from scrape in about 4 weeks for reassessment of biopsy proven but not yet treated SCC left forearm.  Also with SCCIS left upper arm biopsied last visit - has not treated with Efudex yet given proximity to recent lacerations.        Review of Systems   Constitutional: Negative for fever, chills, weight loss, weight gain, fatigue, night sweats and malaise.   Respiratory: Negative for cough and shortness of breath.    Skin: Positive for activity-related sunscreen use and sensitivity to antibiotic ointment. Negative for itching, rash and wears hat.   Psychiatric/Behavioral: Negative for high stress.   Hematologic/Lymphatic: Does not bruise/bleed easily.        Objective:    Physical Exam   Constitutional: He appears well-developed and well-nourished.   Neurological: He is alert and oriented to person, place, and time.   Psychiatric: He has a normal mood and affect.   Skin:   Areas Examined (abnormalities noted in diagram):   Scalp / Hair Palpated and Inspected  Head / Face Inspection Performed  Neck Inspection Performed  RUE Inspected  LUE Inspection Performed                   Diagram Legend     Erythematous scaling macule/papule c/w actinic keratosis       Vascular papule c/w angioma      Pigmented verrucoid papule/plaque c/w seborrheic keratosis      Yellow umbilicated papule c/w sebaceous hyperplasia      Irregularly shaped tan macule c/w lentigo     1-2 mm smooth white papules consistent with Milia      Movable subcutaneous cyst with punctum c/w epidermal inclusion cyst      Subcutaneous  movable cyst c/w pilar cyst      Firm pink to brown papule c/w dermatofibroma      Pedunculated fleshy papule(s) c/w skin tag(s)      Evenly pigmented macule c/w junctional nevus     Mildly variegated pigmented, slightly irregular-bordered macule c/w mildly atypical nevus      Flesh colored to evenly pigmented papule c/w intradermal nevus       Pink pearly papule/plaque c/w basal cell carcinoma      Erythematous hyperkeratotic cursted plaque c/w SCC      Surgical scar with no sign of skin cancer recurrence      Open and closed comedones      Inflammatory papules and pustules      Verrucoid papule consistent consistent with wart     Erythematous eczematous patches and plaques     Dystrophic onycholytic nail with subungual debris c/w onychomycosis     Umbilicated papule    Erythematous-base heme-crusted tan verrucoid plaque consistent with inflamed seborrheic keratosis     Erythematous Silvery Scaling Plaque c/w Psoriasis     See annotation      Assessment / Plan:   Rule Out - Biopsy 1: Hypertrophic Actinic Keratosis versus Squamous Cell Carcinoma.    Rule Out - Biopsy 2: Hypertrophic Actinic Keratosis versus Squamous Cell Carcinoma.        Pathology Orders:     Normal Orders This Visit    Specimen to Pathology, Dermatology     Comments:    Number of Specimens:->2  ------------------------->-------------------------  Spec 1 Procedure:->Biopsy  Spec 1 Clinical Impression:->r/o HAK vs SCC  Spec 1 Source:->right radial dorsal hand  ------------------------->-------------------------  Spec 2 Procedure:->Biopsy  Spec 2 Clinical Impression:->r/o HAK vs SCC  Spec 2 Source:->left middle arm    Questions:    Procedure Type: Dermatology and skin neoplasms    Number of Specimens: 2    ------------------------: -------------------------    Spec 1 Procedure: Biopsy    Spec 1 Clinical Impression: r/o HAK vs SCC    Spec 1 Source: right radial dorsal hand    ------------------------: -------------------------    Spec 2 Procedure:  Biopsy    Spec 2 Clinical Impression: r/o HAK vs SCC    Spec 2 Source: left middle arm        Neoplasm of uncertain behavior of skin  -     Specimen to Pathology, Dermatology    Shave biopsy procedure note:    Shave biopsies x 2 performed after verbal consent including risk of infection, scar, recurrence, need for additional treatment of site. Area prepped with alcohol, anesthetized with approximately 1.0cc of 1% lidocaine with epinephrine. Lesional tissue shaved with razor blade. Hemostasis achieved with application of aluminum chloride followed by hyfrecation. No complications. Dressing applied. Wound care explained.        Multiple actinic keratoses - Cryosurgery Procedure Note    Verbal consent from the patient is obtained including, but not limited to, risk of hypopigmentation/hyperpigmentation, scar, recurrence of lesion. The patient is aware of the precancerous quality and need for treatment of these lesions. Liquid nitrogen cryosurgery is applied to the 8 actinic keratoses, as detailed in the physical exam, to produce a freeze injury. The patient is aware that blisters may form and is instructed on wound care with gentle cleansing and use of vaseline ointment to keep moist until healed. The patient is supplied a handout on cryosurgery and is instructed to call if lesions do not completely resolve.      History of nonmelanoma skin cancer - no evidence of recurrence; several lesions concerning for AK vs SCC to bilateral forearms and hands.  Will treat forearms, hands, left upper arm SCCIS with Efudex after lacerations and today's cryo sites and biopsy sites heal.  Will try to start course of Efudex in 1 month.             No follow-ups on file.

## 2020-09-03 LAB
FINAL PATHOLOGIC DIAGNOSIS: NORMAL
GROSS: NORMAL

## 2020-09-07 DIAGNOSIS — L57.0 MULTIPLE ACTINIC KERATOSES: Primary | ICD-10-CM

## 2020-09-07 RX ORDER — FLUOROURACIL 0.04 G/G
1 CREAM TOPICAL DAILY
Qty: 40 G | Refills: 3 | Status: SHIPPED | OUTPATIENT
Start: 2020-09-07 | End: 2020-10-05

## 2020-09-08 ENCOUNTER — TELEPHONE (OUTPATIENT)
Dept: DERMATOLOGY | Facility: CLINIC | Age: 70
End: 2020-09-08

## 2020-09-08 NOTE — TELEPHONE ENCOUNTER
Pt was scheduled for Mohs sx on 10/5/2020 at 11:30 for SCC left forearm. Pt was told not to use Efudex on the left arm. Pt verbally confirm appt date and time and understanding of information given to him.

## 2020-09-10 ENCOUNTER — CLINICAL SUPPORT (OUTPATIENT)
Dept: PRIMARY CARE CLINIC | Facility: CLINIC | Age: 70
End: 2020-09-10
Payer: MEDICARE

## 2020-09-10 DIAGNOSIS — Z23 NEED FOR INFLUENZA VACCINATION: Primary | ICD-10-CM

## 2020-09-10 PROCEDURE — G0008 ADMIN INFLUENZA VIRUS VAC: HCPCS | Mod: S$GLB,,, | Performed by: INTERNAL MEDICINE

## 2020-09-10 PROCEDURE — G0008 FLU VACCINE - HIGH DOSE (65+) PRESERVATIVE FREE IM: ICD-10-PCS | Mod: S$GLB,,, | Performed by: INTERNAL MEDICINE

## 2020-09-10 PROCEDURE — 90662 FLU VACCINE - HIGH DOSE (65+) PRESERVATIVE FREE IM: ICD-10-PCS | Mod: S$GLB,,, | Performed by: INTERNAL MEDICINE

## 2020-09-10 PROCEDURE — 90662 IIV NO PRSV INCREASED AG IM: CPT | Mod: S$GLB,,, | Performed by: INTERNAL MEDICINE

## 2020-09-10 NOTE — PROGRESS NOTES
Verified pt ID using name and . NKDA. Administered Flu High Dose Vaccine IM in Left deltoid per physician order using aseptic technique. Aspirated and no blood return noted. Pt tolerated well with no adverse reactions noted.

## 2020-09-14 NOTE — PROGRESS NOTES
Subjective:       Patient ID: Jarrett Charlton Jr. is a 70 y.o. White male who presents for follow-up evaluation of   CKD, hyperkalemia    HPI    Last seen by me in May 2020. Previously seen by Dr. Lyon in Aug 2019.  Prior pertinent chart reviewed since this is patient's first appointment with me.    Patient presents for f/u of CKD.  Baseline creatinine had been 1.3-1.5 c frequent increases to 1.6-1.8 range an occasional AKIs c higher sCr. Had GRADY in late December 2019 c peak sCr of 2.3 mg/dL 2/2 volume depletion from high output in ileostomy. Has had hypomagnesemia in the past.  Had K of 5.9 in November; provided with K finder. Since December 2019, new sCr baseline 1.4-1.6 mg/dL.    Home BPs: 120s/80s.    Significant hx includes Hypertension for 5 years, DM for 3 yrs, gout, ulcerative colitis with ostomy and hyperlipidemia, prostate cancer    The patient denies taking NSAIDs, herbal supplements, recreational drugs, recent episode of dehydration, diarrhea, nausea or vomiting. No recent hospitalizations, no acute illnesses.  Had IV contrast with cardiologist for MRI on 4/3/2020. Ostomy output has decreased and urine output has increased.    Social history: retired, took care of wheelchair-bound wife, but she passed away in July 2020  Significant family hx includes: no known kidney disease in family.    Last renal US: Sept 2019, reviewed. Simple left renal cyst.    Review of Systems   Constitutional: Positive for fatigue and unexpected weight change (weight gain).   HENT: Positive for tinnitus.    Respiratory: Negative for shortness of breath.    Cardiovascular: Positive for chest pain (occassional - being worked up by cardiology) and leg swelling. Negative for palpitations.   Gastrointestinal: Negative for constipation, diarrhea, nausea and vomiting.        Reports ostomy   Genitourinary: Positive for frequency (nocturia). Negative for difficulty urinating, dysuria, flank pain, hematuria and urgency.        Pt  "says urine output has increased with decreased ostomy output   Musculoskeletal: Positive for back pain (lower) and myalgias (leg cramping).   Neurological: Negative for dizziness (reports feeling like he is off balance sometimes) and syncope.        Neuropathy to legs   Psychiatric/Behavioral: Positive for sleep disturbance.       Objective:       Blood pressure 122/78, pulse 77, height 6' 2" (1.88 m), weight 100.5 kg (221 lb 9.6 oz), SpO2 99 %.  Physical Exam  Vitals signs reviewed.   Constitutional:       General: He is not in acute distress.     Appearance: Normal appearance. He is well-developed. He is not ill-appearing or diaphoretic.   Eyes:      Conjunctiva/sclera: Conjunctivae normal.   Neck:      Musculoskeletal: Neck supple.   Cardiovascular:      Rate and Rhythm: Normal rate and regular rhythm.      Heart sounds: Normal heart sounds. No murmur. No friction rub. No gallop.    Pulmonary:      Effort: Pulmonary effort is normal. No respiratory distress.      Breath sounds: Normal breath sounds. No wheezing or rales.   Abdominal:      General: Bowel sounds are normal. There is no distension.      Palpations: Abdomen is soft.      Tenderness: There is no abdominal tenderness. There is no right CVA tenderness or left CVA tenderness.   Musculoskeletal:      Right lower leg: Edema (mild) present.      Left lower leg: Edema (mild) present.   Skin:     General: Skin is warm and dry.   Neurological:      Mental Status: He is alert and oriented to person, place, and time.   Psychiatric:         Mood and Affect: Mood normal.         Behavior: Behavior normal.         Thought Content: Thought content normal.         Judgment: Judgment normal.             Outside labs 10/24/19  Crt: 1.5 mg/dL  Hgb: 15.2 g/dL  CO2: 20 mmol/L  K: 5.9 mmol/L  A1c: 7.1%    Assessment:       1. CKD (chronic kidney disease) stage 3, GFR 30-59 ml/min    2. Hypertension, unspecified type    3. Altered bowel elimination due to intestinal ostomy  "   4. Hyperkalemia    5. Leg cramps    6. Leg cramping    7. Hypomagnesemia        Plan:     CKD stage 3B c eGFR 43-50 mL/min - 2/2 age-related nephron loss, HTN, previous NSAID use for gout, volume depletion episodes c high ostomy output. Has been stable. No longer using NSAIDs;  Now with lower ostomy output on imodium.  At risk for progression given frequent AKIs. On PPI.  UPCR No significant proteinuria. Lisinopril previously d/c to help decrease risk of frequent AKIs.     Acid-base WNL.   Renal osteodystrophy PTH WNL. Ca WNL. On ergocalciferol.   Anemia At goal for CKD   DM At goal for CKD in April. On glimepiride.   Lipid Management On statin per PCP     HTN - Home BPs WNL on amlodipine 5 mg, carvedilol 6.25 mg BID; Diastolic BP tends to run on higher side.  Eats a low salt diet.  BP has been managed by cardiologist.    Hyperkalemia - Has been WNL. Previously given K finder.    Edema - Patient says he notices it in the evenings. Resolves with elevation. Already eating low salt diet. Says it is not bothersome to him.  Negligible on exam today.  Increased amlodipine dose could be contributing. Would recommend halving dose and adding low dose diuretic or increasing other BP med if edema becomes bothersome.    Leg cramping - previously with hypomagnesemia; no longer on magnesium supplemntes. Will recheck level.    Intestinal ostomy - Previously with frequent liquid stools causing GRADY. Now on imodium per Dr. Haley with improvement in diarrhea.    All questions patient had were answered.  Asked if further questions. None. F/u in 6 mos with labs and urine prior to next visit or sooner if needed.  ER for emergency concerns.    Summary of Plan:  1. Magnesium level  2. Low salt diet  3. avoid NSAID/ bactrim/ IV contrast/ gadolinium/ aminoglycoside where possible; needs to hydrate well before and after IV contrast administration if it is used  4. RTC in 6 mos

## 2020-09-16 ENCOUNTER — OFFICE VISIT (OUTPATIENT)
Dept: NEPHROLOGY | Facility: CLINIC | Age: 70
End: 2020-09-16
Payer: MEDICARE

## 2020-09-16 ENCOUNTER — LAB VISIT (OUTPATIENT)
Dept: LAB | Facility: HOSPITAL | Age: 70
End: 2020-09-16
Payer: MEDICARE

## 2020-09-16 VITALS
WEIGHT: 221.63 LBS | HEIGHT: 74 IN | HEART RATE: 77 BPM | SYSTOLIC BLOOD PRESSURE: 122 MMHG | OXYGEN SATURATION: 99 % | BODY MASS INDEX: 28.44 KG/M2 | DIASTOLIC BLOOD PRESSURE: 78 MMHG

## 2020-09-16 DIAGNOSIS — R25.2 LEG CRAMPS: ICD-10-CM

## 2020-09-16 DIAGNOSIS — K94.19 ALTERED BOWEL ELIMINATION DUE TO INTESTINAL OSTOMY: ICD-10-CM

## 2020-09-16 DIAGNOSIS — E87.5 HYPERKALEMIA: ICD-10-CM

## 2020-09-16 DIAGNOSIS — N18.30 CKD (CHRONIC KIDNEY DISEASE) STAGE 3, GFR 30-59 ML/MIN: Primary | ICD-10-CM

## 2020-09-16 DIAGNOSIS — E83.42 HYPOMAGNESEMIA: ICD-10-CM

## 2020-09-16 DIAGNOSIS — I10 HYPERTENSION, UNSPECIFIED TYPE: ICD-10-CM

## 2020-09-16 DIAGNOSIS — R25.2 LEG CRAMPING: ICD-10-CM

## 2020-09-16 LAB — MAGNESIUM SERPL-MCNC: 1.8 MG/DL (ref 1.6–2.6)

## 2020-09-16 PROCEDURE — 99214 PR OFFICE/OUTPT VISIT, EST, LEVL IV, 30-39 MIN: ICD-10-PCS | Mod: S$GLB,,, | Performed by: NURSE PRACTITIONER

## 2020-09-16 PROCEDURE — 99999 PR PBB SHADOW E&M-EST. PATIENT-LVL V: CPT | Mod: PBBFAC,,, | Performed by: NURSE PRACTITIONER

## 2020-09-16 PROCEDURE — 99999 PR PBB SHADOW E&M-EST. PATIENT-LVL V: ICD-10-PCS | Mod: PBBFAC,,, | Performed by: NURSE PRACTITIONER

## 2020-09-16 PROCEDURE — 1100F PR PT FALLS ASSESS DOC 2+ FALLS/FALL W/INJURY/YR: ICD-10-PCS | Mod: CPTII,S$GLB,, | Performed by: NURSE PRACTITIONER

## 2020-09-16 PROCEDURE — 3078F PR MOST RECENT DIASTOLIC BLOOD PRESSURE < 80 MM HG: ICD-10-PCS | Mod: CPTII,S$GLB,, | Performed by: NURSE PRACTITIONER

## 2020-09-16 PROCEDURE — 1100F PTFALLS ASSESS-DOCD GE2>/YR: CPT | Mod: CPTII,S$GLB,, | Performed by: NURSE PRACTITIONER

## 2020-09-16 PROCEDURE — 3008F PR BODY MASS INDEX (BMI) DOCUMENTED: ICD-10-PCS | Mod: CPTII,S$GLB,, | Performed by: NURSE PRACTITIONER

## 2020-09-16 PROCEDURE — 99214 OFFICE O/P EST MOD 30 MIN: CPT | Mod: S$GLB,,, | Performed by: NURSE PRACTITIONER

## 2020-09-16 PROCEDURE — 3288F PR FALLS RISK ASSESSMENT DOCUMENTED: ICD-10-PCS | Mod: CPTII,S$GLB,, | Performed by: NURSE PRACTITIONER

## 2020-09-16 PROCEDURE — 1159F PR MEDICATION LIST DOCUMENTED IN MEDICAL RECORD: ICD-10-PCS | Mod: S$GLB,,, | Performed by: NURSE PRACTITIONER

## 2020-09-16 PROCEDURE — 1126F AMNT PAIN NOTED NONE PRSNT: CPT | Mod: S$GLB,,, | Performed by: NURSE PRACTITIONER

## 2020-09-16 PROCEDURE — 3074F PR MOST RECENT SYSTOLIC BLOOD PRESSURE < 130 MM HG: ICD-10-PCS | Mod: CPTII,S$GLB,, | Performed by: NURSE PRACTITIONER

## 2020-09-16 PROCEDURE — 3288F FALL RISK ASSESSMENT DOCD: CPT | Mod: CPTII,S$GLB,, | Performed by: NURSE PRACTITIONER

## 2020-09-16 PROCEDURE — 1126F PR PAIN SEVERITY QUANTIFIED, NO PAIN PRESENT: ICD-10-PCS | Mod: S$GLB,,, | Performed by: NURSE PRACTITIONER

## 2020-09-16 PROCEDURE — 1159F MED LIST DOCD IN RCRD: CPT | Mod: S$GLB,,, | Performed by: NURSE PRACTITIONER

## 2020-09-16 PROCEDURE — 36415 COLL VENOUS BLD VENIPUNCTURE: CPT

## 2020-09-16 PROCEDURE — 3008F BODY MASS INDEX DOCD: CPT | Mod: CPTII,S$GLB,, | Performed by: NURSE PRACTITIONER

## 2020-09-16 PROCEDURE — 3074F SYST BP LT 130 MM HG: CPT | Mod: CPTII,S$GLB,, | Performed by: NURSE PRACTITIONER

## 2020-09-16 PROCEDURE — 3078F DIAST BP <80 MM HG: CPT | Mod: CPTII,S$GLB,, | Performed by: NURSE PRACTITIONER

## 2020-09-16 PROCEDURE — 83735 ASSAY OF MAGNESIUM: CPT

## 2020-09-16 RX ORDER — LORAZEPAM 0.5 MG/1
0.5 TABLET ORAL NIGHTLY
COMMUNITY
End: 2020-11-06 | Stop reason: CLARIF

## 2020-09-16 RX ORDER — PANTOPRAZOLE SODIUM 40 MG/1
40 TABLET, DELAYED RELEASE ORAL DAILY
COMMUNITY
End: 2020-10-02

## 2020-09-16 NOTE — PATIENT INSTRUCTIONS
Avoid NSAID (ibuprofen, advil, motrin, aleve, naprosyn, naproxen, [place], Goody's Powder). Tylenol is okay.  Avoid bactrim/ IV contrast/ gadolinium/ aminoglycoside where possible.  Avoid herbal supplements.  Stay hydrated.  Return to clinic in 6  months with labs (bloodwork and urine) or sooner if needed.  Go to the ER with any emergency concerns.      Chronic Kidney Disease (CKD)     The role of the kidneys is to remove waste products and extra water from the blood.  When the kidneys do not work as they should, waste products begin to build up in the blood. This is called chronic kidney disease (CKD). CKD means that you have kidney damage or a decrease in kidney function lasting at least 3 months. CKD allows extra water, waste, and toxins to build up in the body. This can eventually become life-threatening. You might need dialysis or a kidney transplant to stay alive. This most severe form is called end stage renal disease.  Diabetes is the leading causes of chronic renal failure. Other causes include high blood pressure, hardening of the arteries (atherosclerosis), lupus, inflammation of the blood vessels (vasculitis), and past viral or bacterial infections. Certain over-the-counter pain medicines can cause renal failure when taken often over a long period of time. These include aspirin, ibuprofen, and related anti-inflammatory medicines called NSAIDs (nonsteroidal anti-inflammatory drugs).  Home care  The following guidelines will help you care for yourself at home:  · If you have diabetes, talk with your healthcare provider about keeping your blood sugar under control. Ask if you need to make and changes to your diet, lifestyle, or medicines.  · If you have high blood pressure:  ¨ Take prescribed medicine to lower your blood pressure to the recommended goal of less than 130/80.  ¨ Start a regular exercise program that you enjoy. Check with your healthcare provider to be sure your planned exercise program is  right for you.  ¨ Eat less salt (sodium). Your healthcare provider can tell you how much salt per day is safe for you.  · If you are overweight, talk with your healthcare provider about a weight loss plan.  · If you smoke, you must quit. Smoking makes kidney disease worse. Talk with your healthcare provider about ways to help you quit.  For more information, visit the following links:  ¨ www.[place].gov/sites/default/files/pdf/clearing-the-air-accessible.pdf  ¨ www.smokefree.gov  ¨ www.cancer.org/healthy/stayawayfromtobacco/guidetoquittingsmoking/  · Most people with CKD need to follow a special diet.  Be sure you understand yours. In general, you will need to limit protein, salt, potassium, and phosphorus. You also need to limit how much fluid you drink.   · CKD is a risk factor for heart disease. Talk with your healthcare provider about any other risk factors you might have and what you can do to lessen them.  · Talk with your healthcare provider about any medicines you are taking to find out if they need to be reduced or stopped.  · Don't use the following over-the-counter medicines, or consult your healthcare provider before using:  ¨ Aspirin and NSAIDs such as ibuprofen or naproxen. Using acetaminophen for fever or pain is OK.  ¨ Laxatives and antacids containing magnesium or aluminum  ¨ Fleet or phospho soda enemas containing phosphorus  ¨ Certain stomach acid-blocking medicine such as cimetidine or ranitidine   ¨ Decongestants containing pseudoephedrine   ¨ Herbal supplements  Follow-up care  Follow up with your healthcare provider, or as advised. Contact one of the following for more information:  · American Association of Kidney Patients 454-926-6253 www.aakp.org  · National Kidney Foundation 693-752-4863 www.kidney.org  · American Kidney Fund 607-318-9168 www.kidneyfund.org  · National Kidney Disease Education Program 866-4KIDNEY www.nkdep.nih.gov  If an X-ray, ECG (cardiogram), or other diagnostic test  was taken, you will be told of any new findings that may affect your care.  Call 911  Call 911 if you have any of the following:  · Severe weakness, dizziness, fainting, drowsiness, or confusion  · Chest pain or shortness of breath  · Heart beating fast, slow, or irregularly  When to seek medical advice  Call your healthcare provider right away if any of these occur:  · Nausea or vomiting  · Fever of 100.4°F (38°C) or higher, or as directed by your healthcare provider  · Unexpected weight gain or swelling in the legs, ankles, or around the eyes  · Decrease or absent urine output  Date Last Reviewed: 9/1/2016 © 2000-2017 [employer]. [address] [place] [address], [place] [address]. All rights reserved. This information is not intended as a substitute for professional medical care. Always follow your healthcare professional's instructions.

## 2020-09-16 NOTE — Clinical Note
Raymond Gonzalez,  I just wanted to touch base with you about Mr. Charlton. He said he has been having a lot of trouble sleeping. I know you prescribed him Elavil and xanax, but he never started taking them.  He has still been taking ativan and intermittently taking lexapro prescribed by cardiologist after his wife passed.  He was unclear whether he plans to continue taking these or not. He was very concerned about this issue; I said I'd pass along the info.  Thanks,  Bianka

## 2020-09-20 NOTE — PROGRESS NOTES
Pt prob can have virtual visit to address anxiety insomnia since his wife passed  or psych referall if pt desires or medications to be called in or refills?

## 2020-09-21 DIAGNOSIS — E11.9 TYPE 2 DIABETES MELLITUS WITHOUT COMPLICATION, UNSPECIFIED WHETHER LONG TERM INSULIN USE: ICD-10-CM

## 2020-09-22 ENCOUNTER — OFFICE VISIT (OUTPATIENT)
Dept: SURGERY | Facility: CLINIC | Age: 70
DRG: 390 | End: 2020-09-22
Payer: MEDICARE

## 2020-09-22 ENCOUNTER — TELEPHONE (OUTPATIENT)
Dept: SURGERY | Facility: CLINIC | Age: 70
End: 2020-09-22

## 2020-09-22 VITALS
HEART RATE: 86 BPM | SYSTOLIC BLOOD PRESSURE: 143 MMHG | DIASTOLIC BLOOD PRESSURE: 94 MMHG | BODY MASS INDEX: 27.73 KG/M2 | WEIGHT: 216 LBS

## 2020-09-22 DIAGNOSIS — R14.0 ABDOMINAL DISTENSION (GASEOUS): ICD-10-CM

## 2020-09-22 DIAGNOSIS — R10.32 LEFT LOWER QUADRANT ABDOMINAL PAIN: Primary | ICD-10-CM

## 2020-09-22 PROCEDURE — 99999 PR PBB SHADOW E&M-EST. PATIENT-LVL III: CPT | Mod: PBBFAC,,, | Performed by: COLON & RECTAL SURGERY

## 2020-09-22 PROCEDURE — 3080F PR MOST RECENT DIASTOLIC BLOOD PRESSURE >= 90 MM HG: ICD-10-PCS | Mod: CPTII,S$GLB,, | Performed by: COLON & RECTAL SURGERY

## 2020-09-22 PROCEDURE — 3008F PR BODY MASS INDEX (BMI) DOCUMENTED: ICD-10-PCS | Mod: CPTII,S$GLB,, | Performed by: COLON & RECTAL SURGERY

## 2020-09-22 PROCEDURE — 3080F DIAST BP >= 90 MM HG: CPT | Mod: CPTII,S$GLB,, | Performed by: COLON & RECTAL SURGERY

## 2020-09-22 PROCEDURE — 1159F PR MEDICATION LIST DOCUMENTED IN MEDICAL RECORD: ICD-10-PCS | Mod: S$GLB,,, | Performed by: COLON & RECTAL SURGERY

## 2020-09-22 PROCEDURE — 1101F PR PT FALLS ASSESS DOC 0-1 FALLS W/OUT INJ PAST YR: ICD-10-PCS | Mod: CPTII,S$GLB,, | Performed by: COLON & RECTAL SURGERY

## 2020-09-22 PROCEDURE — 3077F PR MOST RECENT SYSTOLIC BLOOD PRESSURE >= 140 MM HG: ICD-10-PCS | Mod: CPTII,S$GLB,, | Performed by: COLON & RECTAL SURGERY

## 2020-09-22 PROCEDURE — 99214 OFFICE O/P EST MOD 30 MIN: CPT | Mod: S$GLB,,, | Performed by: COLON & RECTAL SURGERY

## 2020-09-22 PROCEDURE — 1159F MED LIST DOCD IN RCRD: CPT | Mod: S$GLB,,, | Performed by: COLON & RECTAL SURGERY

## 2020-09-22 PROCEDURE — 3077F SYST BP >= 140 MM HG: CPT | Mod: CPTII,S$GLB,, | Performed by: COLON & RECTAL SURGERY

## 2020-09-22 PROCEDURE — 1125F PR PAIN SEVERITY QUANTIFIED, PAIN PRESENT: ICD-10-PCS | Mod: S$GLB,,, | Performed by: COLON & RECTAL SURGERY

## 2020-09-22 PROCEDURE — 3008F BODY MASS INDEX DOCD: CPT | Mod: CPTII,S$GLB,, | Performed by: COLON & RECTAL SURGERY

## 2020-09-22 PROCEDURE — 1125F AMNT PAIN NOTED PAIN PRSNT: CPT | Mod: S$GLB,,, | Performed by: COLON & RECTAL SURGERY

## 2020-09-22 PROCEDURE — 99214 PR OFFICE/OUTPT VISIT, EST, LEVL IV, 30-39 MIN: ICD-10-PCS | Mod: S$GLB,,, | Performed by: COLON & RECTAL SURGERY

## 2020-09-22 PROCEDURE — 1101F PT FALLS ASSESS-DOCD LE1/YR: CPT | Mod: CPTII,S$GLB,, | Performed by: COLON & RECTAL SURGERY

## 2020-09-22 PROCEDURE — 99999 PR PBB SHADOW E&M-EST. PATIENT-LVL III: ICD-10-PCS | Mod: PBBFAC,,, | Performed by: COLON & RECTAL SURGERY

## 2020-09-22 NOTE — PROGRESS NOTES
"  CRS Office Visit Followup    Referring Md:   No referring provider defined for this encounter.    SUBJECTIVE:     Chief Complaint: ileostomy problems    History of Present Illness:  The patient is an established patient to this practice.   Course is as follows:  Patient is a 70 y.o. male presents with ileostomy dysfunction  History of ulcerative colitis s/p total proctocolectomy with end ileostomy (1985)  He has required revisions of the ileostomy and ultimately it was transposed to the right side due to parastomal and incisional hernias.  He has had intermittent problems with syncope due to dehydration from the high output ostomy and takes imodium and Pedialyte as needed as well as daily Citrucel.  History also includes diabetes (on metformin) gout, CKD II and HTN.  He had a TURP in 1/2019 and is under surveillance with regular PSAs.    1/29/19: He has been doing well for multiple years.  Over the past 6-8 years, he has had intermittent episodes of dehydration and syncope.  This is 2-3 times per year.  He empties his bag 6-7 times per day.  Changes is pouch every 3-4 days.  No issues with his perineal incision. Overall feels well and is active.  Syncopal episodes require ED transfer and frequent hospital admission.    - started on imodium    2/26/19: Has taken imodium intermittently when stoma output is high but has not taken it scheduled. Thinks the imodium cuts his output about in half but has not taken exact measurements. Still empties his bag 6-8 times a day, he estimates about 500cc per empty. Had an episode of dehydration/near syncope earlier in the month. Also notes intermittent episodes of being "stopped up" associated with RLQ pain lasting up to 24h. He treats this by drinking large volumes of Pedialyte which eventually return of stoma output. Currently feels well, no lightheadedness or dizziness at this time.    8/2/19:  Admitted for bowel obstruction secondary to incarcerated peristomal hernia on " 07/19/2019.  This was reduced in the ER with return of bowel function.   In regards to his diarrhea, he overall feels substantially improved by taking fiber and having decreased output.  Regarding his parastomal hernia, he has never had complaints or recollection of a incarcerated peristomal in the past.  He has had intermittent bowel obstructions they have all been secondary to adhesive disease.  He denies any pain around the stoma.  He is very concerned that revision of the ostomy may result in worse adhesion of the pouch.  He has previously had a left-sided ileostomy that was flat with the scan and did not pouch well.    9/22/2020:  Presented for left lower quadrant abdominal pain.  He complains of abdominal pain and bloating after eating.  No issues with this stoma.  Taking Imodium every other day.  No further dehydration in syncope.  Kidney numbers have improved.  Is taking probiotics.    Review of Systems:  Review of Systems   Constitutional: Negative for chills, diaphoresis, fever, malaise/fatigue and weight loss.   HENT: Negative for congestion.    Respiratory: Negative for shortness of breath.    Cardiovascular: Negative for chest pain and leg swelling.   Gastrointestinal: Positive for abdominal pain and diarrhea. Negative for blood in stool, constipation, nausea and vomiting.   Genitourinary: Negative for dysuria.   Musculoskeletal: Negative for back pain and myalgias.   Skin: Negative for rash.   Neurological: Negative for dizziness, sensory change and weakness.   Endo/Heme/Allergies: Does not bruise/bleed easily.   Psychiatric/Behavioral: Negative for depression.       OBJECTIVE:     Vital Signs (Most Recent)  BP (!) 143/94 (BP Location: Left arm, Patient Position: Sitting, BP Method: Large (Automatic))   Pulse 86   Wt 98 kg (216 lb)   BMI 27.73 kg/m²     Physical Exam:  General: White male in no distress   Neuro: alert and oriented x 4.  Moves all extremities.     HEENT: no icterus.  Trachea  midline  Respiratory: respirations are even and unlabored  Cardiac: regular rate  Abdomen:  Midline laparotomy is well-healed.  Prior ileostomy site in the left upper quadrant is also well healed.  No hernia on the left upper quadrant. Ileostomy currently sits in the right upper quadrant is pink protrudes above the level of the skin.  Moderate peristomal hernia  Extremities: Warm dry and intact  Skin: no rashes  Anorectal:  Deferred    Labs: H/H = 12/38.  Cr = 1.4.  Albumin of 3.7    Imaging:  CT from 07/19/2019 personally reviewed and demonstrates parastomal hernia in the right lower quadrant colostomy showing small-bowel obstruction/strangulation with dilatation proximally to the loop and small bowel fecalization    ASSESSMENT/PLAN:     Jarrett was seen today for abdominal pain.    Diagnoses and all orders for this visit:    Left lower quadrant abdominal pain  -     CT Abdomen Pelvis With Contrast; Future    Abdominal distension (gaseous)  -     CT Abdomen Pelvis With Contrast; Future        70-year-old gentleman with ulcerative colitis status post total procto colectomy and end ileostomy in 1985.  Presents with left lower quadrant abdominal pain.  Ileostomy in the right lower quadrant is pink and healthy appearing.  No issues regarding his peristomal hernia.  Symptoms more consistent with intermittent obstructive disease.  Will therefore plan for CT of the abdomen and pelvis for further evaluation and management.  Given his past surgeries, he is certainly at high risk for adhesive disease.  Discussed that repeat laparotomy for adhesions is likely to result in more adhesions.  He may be taking too much Imodium causing a proximal impaction.  I will call him with the results of the CT scan.      CED Haley MD  Staff Surgeon  Colon & Rectal Surgery

## 2020-09-24 ENCOUNTER — TELEPHONE (OUTPATIENT)
Dept: NEPHROLOGY | Facility: CLINIC | Age: 70
End: 2020-09-24

## 2020-09-24 LAB
LEFT EYE DM RETINOPATHY: POSITIVE
RIGHT EYE DM RETINOPATHY: POSITIVE

## 2020-09-24 NOTE — TELEPHONE ENCOUNTER
----- Message from Bianka Arevalo NP sent at 9/24/2020 11:52 AM CDT -----  Regarding: RE: Prescription Inquiry  Should be fine.  ----- Message -----  From: Annita Reid RN  Sent: 9/24/2020  11:41 AM CDT  To: Bianka Arevalo NP  Subject: FW: Prescription Inquiry                           ----- Message -----  From: Horacio Casey  Sent: 9/24/2020  11:37 AM CDT  To: Irina Carlton Staff, Sonali Denton Staff  Subject: Prescription Inquiry                             Pt called inquiring or requesting an approval to take      Nicotinamide ( Once a day )       [phone] (home)

## 2020-09-25 ENCOUNTER — HOSPITAL ENCOUNTER (INPATIENT)
Facility: HOSPITAL | Age: 70
LOS: 2 days | Discharge: HOME | DRG: 390 | End: 2020-09-27
Attending: COLON & RECTAL SURGERY | Admitting: COLON & RECTAL SURGERY
Payer: MEDICARE

## 2020-09-25 DIAGNOSIS — K56.600 PARTIAL SMALL BOWEL OBSTRUCTION: Primary | ICD-10-CM

## 2020-09-25 PROCEDURE — 20600001 HC STEP DOWN PRIVATE ROOM

## 2020-09-25 PROCEDURE — 25000003 PHARM REV CODE 250: Performed by: SURGERY

## 2020-09-25 RX ORDER — SODIUM CHLORIDE 0.9 % (FLUSH) 0.9 %
10 SYRINGE (ML) INJECTION
Status: DISCONTINUED | OUTPATIENT
Start: 2020-09-25 | End: 2020-09-27 | Stop reason: HOSPADM

## 2020-09-25 RX ORDER — OXYCODONE HYDROCHLORIDE 5 MG/1
5 TABLET ORAL EVERY 4 HOURS PRN
Status: DISCONTINUED | OUTPATIENT
Start: 2020-09-25 | End: 2020-09-27 | Stop reason: HOSPADM

## 2020-09-25 RX ORDER — OXYCODONE HYDROCHLORIDE 10 MG/1
10 TABLET ORAL EVERY 4 HOURS PRN
Status: DISCONTINUED | OUTPATIENT
Start: 2020-09-25 | End: 2020-09-27 | Stop reason: HOSPADM

## 2020-09-25 RX ORDER — ENOXAPARIN SODIUM 100 MG/ML
40 INJECTION SUBCUTANEOUS EVERY 24 HOURS
Status: DISCONTINUED | OUTPATIENT
Start: 2020-09-26 | End: 2020-09-27 | Stop reason: HOSPADM

## 2020-09-25 RX ORDER — ACETAMINOPHEN 325 MG/1
650 TABLET ORAL EVERY 4 HOURS PRN
Status: DISCONTINUED | OUTPATIENT
Start: 2020-09-25 | End: 2020-09-27 | Stop reason: HOSPADM

## 2020-09-25 RX ORDER — LORAZEPAM 0.5 MG/1
0.5 TABLET ORAL NIGHTLY
Status: DISCONTINUED | OUTPATIENT
Start: 2020-09-25 | End: 2020-09-27 | Stop reason: HOSPADM

## 2020-09-25 RX ORDER — DEXTROSE MONOHYDRATE, SODIUM CHLORIDE, AND POTASSIUM CHLORIDE 50; 1.49; 4.5 G/1000ML; G/1000ML; G/1000ML
INJECTION, SOLUTION INTRAVENOUS CONTINUOUS
Status: DISCONTINUED | OUTPATIENT
Start: 2020-09-25 | End: 2020-09-27 | Stop reason: HOSPADM

## 2020-09-25 RX ORDER — LIDOCAINE HYDROCHLORIDE 10 MG/ML
1 INJECTION, SOLUTION EPIDURAL; INFILTRATION; INTRACAUDAL; PERINEURAL ONCE
Status: DISCONTINUED | OUTPATIENT
Start: 2020-09-25 | End: 2020-09-27 | Stop reason: HOSPADM

## 2020-09-25 RX ORDER — AMLODIPINE BESYLATE 5 MG/1
5 TABLET ORAL DAILY
Status: DISCONTINUED | OUTPATIENT
Start: 2020-09-26 | End: 2020-09-27 | Stop reason: HOSPADM

## 2020-09-25 RX ORDER — CARVEDILOL 6.25 MG/1
6.25 TABLET ORAL 2 TIMES DAILY WITH MEALS
Status: DISCONTINUED | OUTPATIENT
Start: 2020-09-26 | End: 2020-09-27 | Stop reason: HOSPADM

## 2020-09-25 RX ORDER — TALC
6 POWDER (GRAM) TOPICAL NIGHTLY PRN
Status: DISCONTINUED | OUTPATIENT
Start: 2020-09-25 | End: 2020-09-27 | Stop reason: HOSPADM

## 2020-09-25 RX ORDER — AMITRIPTYLINE HYDROCHLORIDE 25 MG/1
50 TABLET, FILM COATED ORAL NIGHTLY
Status: DISCONTINUED | OUTPATIENT
Start: 2020-09-25 | End: 2020-09-27 | Stop reason: HOSPADM

## 2020-09-25 RX ORDER — ONDANSETRON 2 MG/ML
4 INJECTION INTRAMUSCULAR; INTRAVENOUS EVERY 6 HOURS PRN
Status: DISCONTINUED | OUTPATIENT
Start: 2020-09-25 | End: 2020-09-27 | Stop reason: HOSPADM

## 2020-09-25 RX ORDER — CLOPIDOGREL BISULFATE 75 MG/1
75 TABLET ORAL DAILY
Status: DISCONTINUED | OUTPATIENT
Start: 2020-09-26 | End: 2020-09-27 | Stop reason: HOSPADM

## 2020-09-25 RX ADMIN — LORAZEPAM 0.5 MG: 0.5 TABLET ORAL at 09:09

## 2020-09-25 RX ADMIN — OXYCODONE HYDROCHLORIDE 10 MG: 10 TABLET ORAL at 10:09

## 2020-09-25 RX ADMIN — AMITRIPTYLINE HYDROCHLORIDE 50 MG: 25 TABLET, FILM COATED ORAL at 09:09

## 2020-09-25 RX ADMIN — POTASSIUM CHLORIDE, DEXTROSE MONOHYDRATE AND SODIUM CHLORIDE: 150; 5; 450 INJECTION, SOLUTION INTRAVENOUS at 10:09

## 2020-09-26 LAB
ANION GAP SERPL CALC-SCNC: 11 MMOL/L (ref 8–16)
BASOPHILS # BLD AUTO: 0.02 K/UL (ref 0–0.2)
BASOPHILS NFR BLD: 0.4 % (ref 0–1.9)
BUN SERPL-MCNC: 16 MG/DL (ref 8–23)
CALCIUM SERPL-MCNC: 8.9 MG/DL (ref 8.7–10.5)
CHLORIDE SERPL-SCNC: 105 MMOL/L (ref 95–110)
CO2 SERPL-SCNC: 25 MMOL/L (ref 23–29)
CREAT SERPL-MCNC: 1.3 MG/DL (ref 0.5–1.4)
DIFFERENTIAL METHOD: ABNORMAL
EOSINOPHIL # BLD AUTO: 0.1 K/UL (ref 0–0.5)
EOSINOPHIL NFR BLD: 1.9 % (ref 0–8)
ERYTHROCYTE [DISTWIDTH] IN BLOOD BY AUTOMATED COUNT: 14.5 % (ref 11.5–14.5)
EST. GFR  (AFRICAN AMERICAN): >60 ML/MIN/1.73 M^2
EST. GFR  (NON AFRICAN AMERICAN): 54.9 ML/MIN/1.73 M^2
ESTIMATED AVG GLUCOSE: 137 MG/DL (ref 68–131)
GLUCOSE SERPL-MCNC: 131 MG/DL (ref 70–110)
HBA1C MFR BLD HPLC: 6.4 % (ref 4–5.6)
HCT VFR BLD AUTO: 38.1 % (ref 40–54)
HGB BLD-MCNC: 12.1 G/DL (ref 14–18)
IMM GRANULOCYTES # BLD AUTO: 0.01 K/UL (ref 0–0.04)
IMM GRANULOCYTES NFR BLD AUTO: 0.2 % (ref 0–0.5)
LYMPHOCYTES # BLD AUTO: 1.3 K/UL (ref 1–4.8)
LYMPHOCYTES NFR BLD: 22.7 % (ref 18–48)
MAGNESIUM SERPL-MCNC: 1.7 MG/DL (ref 1.6–2.6)
MCH RBC QN AUTO: 28.6 PG (ref 27–31)
MCHC RBC AUTO-ENTMCNC: 31.8 G/DL (ref 32–36)
MCV RBC AUTO: 90 FL (ref 82–98)
MONOCYTES # BLD AUTO: 0.4 K/UL (ref 0.3–1)
MONOCYTES NFR BLD: 7.7 % (ref 4–15)
NEUTROPHILS # BLD AUTO: 3.8 K/UL (ref 1.8–7.7)
NEUTROPHILS NFR BLD: 67.1 % (ref 38–73)
NRBC BLD-RTO: 0 /100 WBC
PHOSPHATE SERPL-MCNC: 3.6 MG/DL (ref 2.7–4.5)
PLATELET # BLD AUTO: 139 K/UL (ref 150–350)
PMV BLD AUTO: 11.9 FL (ref 9.2–12.9)
POTASSIUM SERPL-SCNC: 3.5 MMOL/L (ref 3.5–5.1)
RBC # BLD AUTO: 4.23 M/UL (ref 4.6–6.2)
SODIUM SERPL-SCNC: 141 MMOL/L (ref 136–145)
WBC # BLD AUTO: 5.69 K/UL (ref 3.9–12.7)

## 2020-09-26 PROCEDURE — 80048 BASIC METABOLIC PNL TOTAL CA: CPT

## 2020-09-26 PROCEDURE — 20600001 HC STEP DOWN PRIVATE ROOM

## 2020-09-26 PROCEDURE — 63600175 PHARM REV CODE 636 W HCPCS: Performed by: SURGERY

## 2020-09-26 PROCEDURE — 36415 COLL VENOUS BLD VENIPUNCTURE: CPT

## 2020-09-26 PROCEDURE — 99223 1ST HOSP IP/OBS HIGH 75: CPT | Mod: AI,,, | Performed by: COLON & RECTAL SURGERY

## 2020-09-26 PROCEDURE — 85025 COMPLETE CBC W/AUTO DIFF WBC: CPT

## 2020-09-26 PROCEDURE — 94761 N-INVAS EAR/PLS OXIMETRY MLT: CPT

## 2020-09-26 PROCEDURE — 83735 ASSAY OF MAGNESIUM: CPT

## 2020-09-26 PROCEDURE — 25000003 PHARM REV CODE 250: Performed by: SURGERY

## 2020-09-26 PROCEDURE — 83036 HEMOGLOBIN GLYCOSYLATED A1C: CPT

## 2020-09-26 PROCEDURE — 84100 ASSAY OF PHOSPHORUS: CPT

## 2020-09-26 PROCEDURE — 99223 PR INITIAL HOSPITAL CARE,LEVL III: ICD-10-PCS | Mod: AI,,, | Performed by: COLON & RECTAL SURGERY

## 2020-09-26 RX ORDER — INSULIN ASPART 100 [IU]/ML
0-5 INJECTION, SOLUTION INTRAVENOUS; SUBCUTANEOUS
Status: DISCONTINUED | OUTPATIENT
Start: 2020-09-26 | End: 2020-09-27 | Stop reason: HOSPADM

## 2020-09-26 RX ORDER — GLUCAGON 1 MG
1 KIT INJECTION
Status: DISCONTINUED | OUTPATIENT
Start: 2020-09-26 | End: 2020-09-27 | Stop reason: HOSPADM

## 2020-09-26 RX ORDER — IBUPROFEN 200 MG
24 TABLET ORAL
Status: DISCONTINUED | OUTPATIENT
Start: 2020-09-26 | End: 2020-09-27 | Stop reason: HOSPADM

## 2020-09-26 RX ORDER — IBUPROFEN 200 MG
16 TABLET ORAL
Status: DISCONTINUED | OUTPATIENT
Start: 2020-09-26 | End: 2020-09-27 | Stop reason: HOSPADM

## 2020-09-26 RX ADMIN — ACETAMINOPHEN 650 MG: 325 TABLET ORAL at 07:09

## 2020-09-26 RX ADMIN — CARVEDILOL 6.25 MG: 6.25 TABLET, FILM COATED ORAL at 07:09

## 2020-09-26 RX ADMIN — POTASSIUM CHLORIDE, DEXTROSE MONOHYDRATE AND SODIUM CHLORIDE: 150; 5; 450 INJECTION, SOLUTION INTRAVENOUS at 05:09

## 2020-09-26 RX ADMIN — CARVEDILOL 6.25 MG: 6.25 TABLET, FILM COATED ORAL at 04:09

## 2020-09-26 RX ADMIN — LORAZEPAM 0.5 MG: 0.5 TABLET ORAL at 09:09

## 2020-09-26 RX ADMIN — AMLODIPINE BESYLATE 5 MG: 5 TABLET ORAL at 11:09

## 2020-09-26 RX ADMIN — ALLOPURINOL 300 MG: 300 TABLET ORAL at 11:09

## 2020-09-26 RX ADMIN — OXYCODONE HYDROCHLORIDE 5 MG: 5 TABLET ORAL at 04:09

## 2020-09-26 RX ADMIN — CLOPIDOGREL 75 MG: 75 TABLET, FILM COATED ORAL at 11:09

## 2020-09-26 RX ADMIN — ENOXAPARIN SODIUM 40 MG: 40 INJECTION SUBCUTANEOUS at 04:09

## 2020-09-26 RX ADMIN — AMITRIPTYLINE HYDROCHLORIDE 50 MG: 25 TABLET, FILM COATED ORAL at 09:09

## 2020-09-26 RX ADMIN — CLONIDINE HYDROCHLORIDE 0.2 MG: 0.3 TABLET ORAL at 11:09

## 2020-09-26 RX ADMIN — OXYCODONE HYDROCHLORIDE 5 MG: 5 TABLET ORAL at 11:09

## 2020-09-26 RX ADMIN — POTASSIUM CHLORIDE, DEXTROSE MONOHYDRATE AND SODIUM CHLORIDE: 150; 5; 450 INJECTION, SOLUTION INTRAVENOUS at 07:09

## 2020-09-26 RX ADMIN — OXYCODONE HYDROCHLORIDE 5 MG: 5 TABLET ORAL at 07:09

## 2020-09-26 NOTE — SUBJECTIVE & OBJECTIVE
Interval History:   No acute events overnight.   Feeling some relief compared to last night.   Having ostomy output.   Minimal NG output. Patient denies nausea vomiting.  Still having some left sided pain. Relieved with pain medication.     Medications:  Continuous Infusions:   dextrose 5 % and 0.45 % NaCl with KCl 20 mEq 125 mL/hr at 09/26/20 0753     Scheduled Meds:   allopurinoL  450 mg Oral Daily    amitriptyline  50 mg Oral QHS    amLODIPine  5 mg Oral Daily    carvediloL  6.25 mg Oral BID WM    cloNIDine  0.5 mg Oral Daily    clopidogreL  75 mg Oral Daily    enoxaparin  40 mg Subcutaneous Q24H    lidocaine (PF) 10 mg/ml (1%)  1 mL Other Once    LORazepam  0.5 mg Oral QHS     PRN Meds:acetaminophen, melatonin, ondansetron, oxyCODONE, oxyCODONE, promethazine (PHENERGAN) IVPB, sodium chloride 0.9%     Review of patient's allergies indicates:  No Known Allergies  Objective:     Vital Signs (Most Recent):  Temp: 98 °F (36.7 °C) (09/26/20 0824)  Pulse: 84 (09/26/20 0824)  Resp: 15 (09/26/20 0824)  BP: (!) 149/74 (09/26/20 0824)  SpO2: (!) 94 % (09/26/20 0824) Vital Signs (24h Range):  Temp:  [96.9 °F (36.1 °C)-98.7 °F (37.1 °C)] 98 °F (36.7 °C)  Pulse:  [] 84  Resp:  [15-24] 15  SpO2:  [94 %-99 %] 94 %  BP: (143-170)/(74-98) 149/74     Weight: 98.9 kg (218 lb 0.6 oz)  Body mass index is 27.99 kg/m².    Intake/Output - Last 3 Shifts       09/24 0700 - 09/25 0659 09/25 0700 - 09/26 0659 09/26 0700 - 09/27 0659    Urine (mL/kg/hr)  0     Drains  50     Stool  150     Total Output  200     Net  -200            Urine Occurrence  300 x     Stool Occurrence  0 x         Physical Exam  Constitutional:       General: He is not in acute distress.     Appearance: Normal appearance. He is not ill-appearing or toxic-appearing.   HENT:      Head: Normocephalic and atraumatic.      Nose:      Comments: NG tube in place     Mouth/Throat:      Mouth: Mucous membranes are moist.      Pharynx: Oropharynx is clear.    Eyes:      Extraocular Movements: Extraocular movements intact.   Cardiovascular:      Rate and Rhythm: Tachycardia present.      Pulses: Normal pulses.   Pulmonary:      Effort: Pulmonary effort is normal. No respiratory distress.      Breath sounds: No stridor. No wheezing.   Abdominal:      General: There is distension (mildly distended).      Tenderness: There is no abdominal tenderness. There is no guarding.      Hernia: A hernia (known parastomal hernia) is present.      Comments: Ileostomy in place on right abdomen, with effluent and gas in bag   Skin:     General: Skin is warm and dry.      Capillary Refill: Capillary refill takes less than 2 seconds.      Coloration: Skin is not jaundiced.   Neurological:      General: No focal deficit present.      Mental Status: He is alert.   Psychiatric:         Mood and Affect: Mood normal.         Behavior: Behavior normal.      Significant Labs:  CBC:   Recent Labs   Lab 09/26/20  0536   WBC 5.69   RBC 4.23*   HGB 12.1*   HCT 38.1*   *   MCV 90   MCH 28.6   MCHC 31.8*     BMP:   Recent Labs   Lab 09/26/20  0536   *      K 3.5      CO2 25   BUN 16   CREATININE 1.3   CALCIUM 8.9   MG 1.7       Significant Diagnostics:  I have reviewed and interpreted all pertinent imaging results/findings within the past 24 hours.

## 2020-09-26 NOTE — PLAN OF CARE
POC reviewed. AAOx4 on RA, somewhat hypertensive with periods of tachycardia, otherwise VSS. Complaints of headache and left sided pain. No complaints of SOB or dizziness. Independent to BR and emptying own ostomy output. Tolerated being NPO complaining only of a dry and scratchy throat. NGT from right nares to IWS with minimal output, 50 mL. No complaints of N/V. NGT clamped for at least 30 minutes after giving PO meds. Pain well controlled with ordered pain medications. Resting quietly with side rails up and call light within reach. Continuing to monitor patient status.

## 2020-09-26 NOTE — PROGRESS NOTES
[facility]  General Surgery  Progress Note    Subjective:     History of Present Illness:  No notes on file    Post-Op Info:  * No surgery found *         Interval History:   No acute events overnight.   Feeling some relief compared to last night.   Having ostomy output.   Minimal NG output. Patient denies nausea vomiting.  Still having some left sided pain. Relieved with pain medication.     Medications:  Continuous Infusions:   dextrose 5 % and 0.45 % NaCl with KCl 20 mEq 125 mL/hr at 09/26/20 0753     Scheduled Meds:   allopurinoL  450 mg Oral Daily    amitriptyline  50 mg Oral QHS    amLODIPine  5 mg Oral Daily    carvediloL  6.25 mg Oral BID WM    cloNIDine  0.5 mg Oral Daily    clopidogreL  75 mg Oral Daily    enoxaparin  40 mg Subcutaneous Q24H    lidocaine (PF) 10 mg/ml (1%)  1 mL Other Once    LORazepam  0.5 mg Oral QHS     PRN Meds:acetaminophen, melatonin, ondansetron, oxyCODONE, oxyCODONE, promethazine (PHENERGAN) IVPB, sodium chloride 0.9%     Review of patient's allergies indicates:  No Known Allergies  Objective:     Vital Signs (Most Recent):  Temp: 98 °F (36.7 °C) (09/26/20 0824)  Pulse: 84 (09/26/20 0824)  Resp: 15 (09/26/20 0824)  BP: (!) 149/74 (09/26/20 0824)  SpO2: (!) 94 % (09/26/20 0824) Vital Signs (24h Range):  Temp:  [96.9 °F (36.1 °C)-98.7 °F (37.1 °C)] 98 °F (36.7 °C)  Pulse:  [] 84  Resp:  [15-24] 15  SpO2:  [94 %-99 %] 94 %  BP: (143-170)/(74-98) 149/74     Weight: 98.9 kg (218 lb 0.6 oz)  Body mass index is 27.99 kg/m².    Intake/Output - Last 3 Shifts       09/24 0700 - 09/25 0659 09/25 0700 - 09/26 0659 09/26 0700 - 09/27 0659    Urine (mL/kg/hr)  0     Drains  50     Stool  150     Total Output  200     Net  -200            Urine Occurrence  300 x     Stool Occurrence  0 x         Physical Exam  Constitutional:       General: He is not in acute distress.     Appearance: Normal appearance. He is not ill-appearing or toxic-appearing.   HENT:       Head: Normocephalic and atraumatic.      Nose:      Comments: NG tube in place     Mouth/Throat:      Mouth: Mucous membranes are moist.      Pharynx: Oropharynx is clear.   Eyes:      Extraocular Movements: Extraocular movements intact.   Cardiovascular:      Rate and Rhythm: Tachycardia present.      Pulses: Normal pulses.   Pulmonary:      Effort: Pulmonary effort is normal. No respiratory distress.      Breath sounds: No stridor. No wheezing.   Abdominal:      General: There is distension (mildly distended).      Tenderness: There is no abdominal tenderness. There is no guarding.      Hernia: A hernia (known parastomal hernia) is present.      Comments: Ileostomy in place on right abdomen, with effluent and gas in bag   Skin:     General: Skin is warm and dry.      Capillary Refill: Capillary refill takes less than 2 seconds.      Coloration: Skin is not jaundiced.   Neurological:      General: No focal deficit present.      Mental Status: He is alert.   Psychiatric:         Mood and Affect: Mood normal.         Behavior: Behavior normal.      Significant Labs:  CBC:   Recent Labs   Lab 09/26/20  0536   WBC 5.69   RBC 4.23*   HGB 12.1*   HCT 38.1*   *   MCV 90   MCH 28.6   MCHC 31.8*     BMP:   Recent Labs   Lab 09/26/20  0536   *      K 3.5      CO2 25   BUN 16   CREATININE 1.3   CALCIUM 8.9   MG 1.7       Significant Diagnostics:  I have reviewed and interpreted all pertinent imaging results/findings within the past 24 hours.    Assessment/Plan:     * Partial small bowel obstruction  Mr. Charlton a 70 y.o. male with history of UC, s/p TPC with end ileostomy in 1985 with partial small bowel obstruction.    No surgical intervention at this time. Improved with NG placement and rest.  Remove NG tube today and monitor.   NPO  Having bowel function. Continue to monitor output.   Activity as tolerated  Maintenance IV fluids  OK for medications per NG tube - administer meds, clamp for 30 mins,  place back to suction     Will continue to monitor        Barbara Mckeon MD  General Surgery  [facility]

## 2020-09-26 NOTE — H&P
[facility]  Colorectal Surgery  History & Physical    Patient Name: Jarrett Charlton Jr.  MRN: [id number]  Admission Date: 9/25/2020  Attending Physician:   Primary Care Provider: Poli Gonzalez MD    Subjective:     Chief Complaint/Reason for Admission: partial SBO    History of Present Illness: 71 yo M, presented to [facility] ED with abdominal pain.  Earlier in the day, the patient had undergone a CT scan with oral contrast.  The patient states that over the subsequent 3-4 hours, he didn't have much ileostomy output and that he developed significant abdominal pain.    The patient was recently seen in clinic by Dr. Haley for intermittent abdominal pain.  He has a history of UC, s/p TPC with end ileostomy in 1985.  He has a known parastomal hernia associated with the ileostomy.  Patient's abdominal pain has been largely left sided - remote from the ileostomy.  It is crampy in nature.  It does not radiate.    The pain was the reason for the CT scan, ordered by Dr. Haley.    In the ED, the patient had an NG tube placed.  He was transferred to [facility] for a higher level of care.    On exam, patient reports that he feels much better. Denies nausea. Reports some output from the stoma.    Facility-Administered Medications Prior to Admission   Medication    leuprolide (6 month) injection 45 mg    leuprolide (6 month) injection 45 mg    leuprolide (6 month) injection 45 mg     PTA Medications   Medication Sig    ACCU-CHEK PAUL CONTROL SOLN Soln 1 drop by NOT APPLICABLE route once daily.    ACCU-CHEK SOFTCLIX LANCETS Misc 1 lancet by NOT APPLICABLE route once daily.    allopurinoL (ZYLOPRIM) 300 MG tablet Take 1.5 tablets (450 mg total) by mouth once daily.    ALPRAZolam (XANAX) 0.5 MG tablet Take 1 tablet (0.5 mg total) by mouth 2 (two) times daily as needed (tinnitis).    amitriptyline (ELAVIL) 25 MG tablet Take 2 tablets (50 mg total) by mouth every evening.    amLODIPine (NORVASC) 2.5 MG tablet Take  5 mg by mouth once daily.     aspirin (ECOTRIN) 81 MG EC tablet Take 81 mg by mouth once daily.      BD ALCOHOL SWABS PadM     blood sugar diagnostic (ONE TOUCH ULTRA TEST) Strp USE ONE STRIP TO CHECK GLUCOSE EVERY DAY    brimonidine 0.2% (ALPHAGAN) 0.2 % Drop INSTILL 1 DROP INTO EACH EYE TWICE DAILY    carvediloL (COREG) 6.25 MG tablet     clindamycin (CLEOCIN) 300 MG capsule Take 1 capsule (300 mg total) by mouth every 6 (six) hours. (Patient not taking: Reported on 9/22/2020)    cloNIDine (CATAPRES) 0.1 MG tablet Take 0.5 mg by mouth once daily.    clopidogrel (PLAVIX) 75 mg tablet once daily.     ergocalciferol, vitamin D2, (VITAMIN D ORAL) Take by mouth.    escitalopram oxalate (LEXAPRO) 10 MG tablet Take 10 mg by mouth once daily.    fluorouraciL (TOLAK) 4 % Crea Apply 1 application topically once daily. To arms and tops of hands    gabapentin (NEURONTIN) 600 MG tablet Take 1 tablet (600 mg total) by mouth 2 (two) times daily.    glimepiride (AMARYL) 4 MG tablet Take 1 tablet (4 mg total) by mouth 2 (two) times daily with meals.    HYDROcodone-acetaminophen (NORCO) 5-325 mg per tablet Take 1 tablet by mouth every 12 (twelve) hours as needed.    HYDROcodone-acetaminophen (NORCO) 5-325 mg per tablet Take 1 tablet by mouth every 6 (six) hours as needed for Pain.    linaGLIPtin (TRADJENTA) 5 mg Tab tablet Take 1 tablet (5 mg total) by mouth once daily.    loperamide (IMODIUM A-D) 2 mg Tab Take 2 mg by mouth once daily.    LORazepam (ATIVAN) 0.5 MG tablet Take 0.5 mg by mouth every evening.    omeprazole (PRILOSEC) 40 MG capsule Take 1 capsule (40 mg total) by mouth once daily.    pantoprazole (PROTONIX) 40 MG tablet Take 40 mg by mouth once daily.       Review of patient's allergies indicates:  No Known Allergies    Past Medical History:   Diagnosis Date    Basal cell carcinoma     BPH (benign prostatic hyperplasia)     s/p TURP    Carotid stenosis     Chronic kidney disease      Claudication     Coronary artery disease     DDD (degenerative disc disease) 10/21/2013    Diabetes mellitus with renal complications     Disc disease, degenerative, cervical     Encounter for blood transfusion     GERD (gastroesophageal reflux disease)     Gout, chronic     History of ulcerative colitis     s/p colectomy and ileostomy    HLD (hyperlipidemia)     HTN (hypertension)     Ileostomy in place 1982    RBBB     Squamous cell carcinoma 03/08/2018    Left superior helix near insertion    Squamous cell carcinoma 04/12/2018    Left forearm x 5    Ventricular tachycardia      Past Surgical History:   Procedure Laterality Date    cardiac stents      CATARACT EXTRACTION Bilateral     CERVICAL FUSION      colectomy and ileostomy  1985    TRANSURETHRAL RESECTION OF PROSTATE (TURP) WITHOUT USE OF LASER N/A 1/23/2019    Procedure: TURP, WITHOUT USING LASER BIPOLAR;  Surgeon: Caatrino Mota MD;  Location: [facility] OR [address] [place] [address];  Service: Urology;  Laterality: N/A;  1.5 HOURS     Family History     Problem Relation (Age of Onset)    Cancer Father    Dementia Mother    Diabetes Father        Tobacco Use    Smoking status: Never Smoker    Smokeless tobacco: Never Used   Substance and Sexual Activity    Alcohol use: No    Drug use: No    Sexual activity: Not Currently     Partners: Female     Review of Systems   Constitutional: Negative for activity change, chills, fatigue and fever.   HENT: Negative for congestion, ear discharge, ear pain and hearing loss.    Eyes: Negative for photophobia, pain and visual disturbance.   Respiratory: Negative for cough, choking, chest tightness, shortness of breath and wheezing.    Cardiovascular: Negative for chest pain and palpitations.   Gastrointestinal: Positive for abdominal distention and abdominal pain. Negative for blood in stool, nausea and vomiting.   Endocrine: Negative for cold intolerance and heat intolerance.   Genitourinary: Negative for flank pain and  frequency.   Musculoskeletal: Negative for arthralgias and back pain.   Skin: Negative for color change and rash.   Neurological: Positive for headaches. Negative for dizziness and light-headedness.   Psychiatric/Behavioral: Negative for behavioral problems and hallucinations.     Objective:     Vital Signs (Most Recent):  Temp: 98.7 °F (37.1 °C) (09/25/20 2006)  Pulse: 107 (09/25/20 2006)  Resp: 20 (09/25/20 2006)  BP: (!) 149/86 (09/25/20 2006)  SpO2: 95 % (09/25/20 2006) Vital Signs (24h Range):  Temp:  [97.7 °F (36.5 °C)-98.7 °F (37.1 °C)] 98.7 °F (37.1 °C)  Pulse:  [] 107  Resp:  [16-24] 20  SpO2:  [95 %-99 %] 95 %  BP: (143-149)/(86-98) 149/86     Weight: 98.9 kg (218 lb 0.6 oz)  Body mass index is 27.99 kg/m².    Physical Exam  Constitutional:       General: He is not in acute distress.     Appearance: Normal appearance. He is not ill-appearing or toxic-appearing.   HENT:      Head: Normocephalic and atraumatic.      Nose:      Comments: NG tube in place     Mouth/Throat:      Mouth: Mucous membranes are moist.      Pharynx: Oropharynx is clear.   Eyes:      Extraocular Movements: Extraocular movements intact.   Cardiovascular:      Rate and Rhythm: Tachycardia present.      Pulses: Normal pulses.   Pulmonary:      Effort: Pulmonary effort is normal. No respiratory distress.      Breath sounds: No stridor. No wheezing.   Abdominal:      General: There is distension (mildly distended).      Tenderness: There is no abdominal tenderness. There is no guarding.      Hernia: A hernia (known parastomal hernia) is present.      Comments: Ileostomy in place on right abdomen, with effluent and gas in bag   Skin:     General: Skin is warm and dry.      Capillary Refill: Capillary refill takes less than 2 seconds.      Coloration: Skin is not jaundiced.   Neurological:      General: No focal deficit present.      Mental Status: He is alert.   Psychiatric:         Mood and Affect: Mood normal.         Behavior:  Behavior normal.             Assessment/Plan:     Active Diagnoses:    Diagnosis Date Noted POA    Partial small bowel obstruction [K56.600] 09/25/2020 Yes      Problems Resolved During this Admission:     No surgical intervention at this time  Continue NG tube for now  NPO  Await durable return of bowel function  Activity as tolerated  Maintenance IV fluids  OK for medications per NG tube - administer meds, clamp for 30 mins, place back to suction    Will continue to monitor    Shakir Matos MD  Colorectal Surgery  [facility]    I have personally obtained a history and performed a physical exam with and independent to my resident and discussed the findings and plan with the patient.  I agree with the above findings and plan with the following exceptions:  None    Partial SBO improved with NGT and rest  Will remove NGT and monitor.      Jonathan STRONG MD, FACS, FASCRS  Staff Surgeon  Dept of Colon and Rectal Surgery  [facility] [place] [place]

## 2020-09-27 ENCOUNTER — NURSE TRIAGE (OUTPATIENT)
Dept: ADMINISTRATIVE | Facility: CLINIC | Age: 70
End: 2020-09-27

## 2020-09-27 VITALS
DIASTOLIC BLOOD PRESSURE: 68 MMHG | SYSTOLIC BLOOD PRESSURE: 110 MMHG | TEMPERATURE: 98 F | HEART RATE: 62 BPM | BODY MASS INDEX: 27.99 KG/M2 | WEIGHT: 218.06 LBS | RESPIRATION RATE: 18 BRPM | OXYGEN SATURATION: 96 %

## 2020-09-27 LAB
POCT GLUCOSE: 142 MG/DL (ref 70–110)
POCT GLUCOSE: 95 MG/DL (ref 70–110)

## 2020-09-27 PROCEDURE — 25000003 PHARM REV CODE 250: Performed by: STUDENT IN AN ORGANIZED HEALTH CARE EDUCATION/TRAINING PROGRAM

## 2020-09-27 PROCEDURE — 25000003 PHARM REV CODE 250: Performed by: SURGERY

## 2020-09-27 RX ORDER — DICYCLOMINE HYDROCHLORIDE 10 MG/1
10 CAPSULE ORAL
Qty: 120 CAPSULE | Refills: 2 | Status: SHIPPED | OUTPATIENT
Start: 2020-09-27 | End: 2020-10-19 | Stop reason: SDUPTHER

## 2020-09-27 RX ORDER — DICYCLOMINE HYDROCHLORIDE 10 MG/1
10 CAPSULE ORAL
Status: DISCONTINUED | OUTPATIENT
Start: 2020-09-27 | End: 2020-09-27 | Stop reason: HOSPADM

## 2020-09-27 RX ORDER — DICYCLOMINE HYDROCHLORIDE 10 MG/1
10 CAPSULE ORAL
Qty: 120 CAPSULE | Refills: 0 | Status: SHIPPED | OUTPATIENT
Start: 2020-09-27 | End: 2020-09-27 | Stop reason: SDUPTHER

## 2020-09-27 RX ADMIN — OXYCODONE HYDROCHLORIDE 10 MG: 10 TABLET ORAL at 09:09

## 2020-09-27 RX ADMIN — CARVEDILOL 6.25 MG: 6.25 TABLET, FILM COATED ORAL at 07:09

## 2020-09-27 RX ADMIN — ACETAMINOPHEN 650 MG: 325 TABLET ORAL at 07:09

## 2020-09-27 RX ADMIN — AMLODIPINE BESYLATE 5 MG: 5 TABLET ORAL at 10:09

## 2020-09-27 RX ADMIN — CLOPIDOGREL 75 MG: 75 TABLET, FILM COATED ORAL at 10:09

## 2020-09-27 RX ADMIN — DICYCLOMINE HYDROCHLORIDE 10 MG: 10 CAPSULE ORAL at 11:09

## 2020-09-27 RX ADMIN — CLONIDINE HYDROCHLORIDE 0.5 MG: 0.3 TABLET ORAL at 10:09

## 2020-09-27 RX ADMIN — POTASSIUM CHLORIDE, DEXTROSE MONOHYDRATE AND SODIUM CHLORIDE: 150; 5; 450 INJECTION, SOLUTION INTRAVENOUS at 12:09

## 2020-09-27 RX ADMIN — ALLOPURINOL 450 MG: 300 TABLET ORAL at 10:09

## 2020-09-27 NOTE — DISCHARGE SUMMARY
[facility]  General Surgery  Discharge Summary      Patient Name: Jarrett Charlton Jr.  MRN: [id number]  Admission Date: 9/25/2020  Hospital Length of Stay: 2 days  Discharge Date and Time: 9/27/2020  2:43 PM  Attending Physician: Jonathan Kulkarni MD  Discharging Provider: Barbara Mckeon MD  Primary Care Provider: Poli Gonzalez MD     HPI:   71 yo M, presented to [facility] ED with abdominal pain.  Earlier in the day, the patient had undergone a CT scan with oral contrast.  The patient states that over the subsequent 3-4 hours, he didn't have much ileostomy output and that he developed significant abdominal pain.     The patient was recently seen in clinic by Dr. Haley for intermittent abdominal pain.  He has a history of UC, s/p TPC with end ileostomy in 1985.  He has a known parastomal hernia associated with the ileostomy.  Patient's abdominal pain has been largely left sided - remote from the ileostomy.  It is crampy in nature.  It does not radiate.     The pain was the reason for the CT scan, ordered by Dr. Haley.     In the ED, the patient had an NG tube placed.  He was transferred to [facility] for a higher level of care.     On exam, patient reports that he feels much better. Denies nausea. Reports some output from the stoma.    * No surgery found *     Hospital Course:   Patient was transferred and admitted to the Colorectal Surgery service on 9/25/2020 under Dr. Kulkarni for partial SBO. He arrived with and NG tube in place. The patient was kept NPO and had improvement of symptoms by the next day. He had ostomy output. The NG tube was removed on 9/26. The patient tolerated an advancement of their diet and continued to have ostomy output. He was deemed okay for discharge on 9/27/2020.     Consults:     Significant Diagnostic Studies: Labs:   BMP:   Recent Labs   Lab 09/26/20  0536   *      K 3.5      CO2 25   BUN 16   CREATININE 1.3   CALCIUM 8.9   MG 1.7    and CBC   Recent Labs   Lab  09/26/20  0536   WBC 5.69   HGB 12.1*   HCT 38.1*   *       Pending Diagnostic Studies:     None        Final Active Diagnoses:    Diagnosis Date Noted POA    PRINCIPAL PROBLEM:  Partial small bowel obstruction [K56.600] 09/25/2020 Yes      Problems Resolved During this Admission:      Discharged Condition: good    Disposition: Home or Self Care    Follow Up:  Follow-up Information     TAE Kulkarni MD In 2 weeks.    Specialty: Colon and Rectal Surgery  Why: s/p hospital for partial sbo  Contact information:  [facility]  [facility] [address]  [phone]                 Patient Instructions:      Diet Adult Regular     Notify your health care provider if you experience any of the following:  increased confusion or weakness     Notify your health care provider if you experience any of the following:  persistent dizziness, light-headedness, or visual disturbances     Notify your health care provider if you experience any of the following:  severe persistent headache     Notify your health care provider if you experience any of the following:  difficulty breathing or increased cough     Notify your health care provider if you experience any of the following:  severe uncontrolled pain     Notify your health care provider if you experience any of the following:  persistent nausea and vomiting or diarrhea     Notify your health care provider if you experience any of the following:  temperature >100.4     Activity as tolerated     Medications:  Reconciled Home Medications:      Medication List      START taking these medications    dicyclomine 10 MG capsule  Commonly known as: BENTYL  Take 1 capsule (10 mg total) by mouth 4 (four) times daily before meals and nightly.        CONTINUE taking these medications    ACCU-CHEK PAUL CONTROL SOLN Soln  Generic drug: blood glucose control high,low  1 drop by NOT APPLICABLE route once daily.     ACCU-CHEK SOFTCLIX LANCETS Misc  Generic drug: lancets  1 lancet by  NOT APPLICABLE route once daily.     allopurinoL 300 MG tablet  Commonly known as: ZYLOPRIM  Take 1.5 tablets (450 mg total) by mouth once daily.     ALPRAZolam 0.5 MG tablet  Commonly known as: XANAX  Take 1 tablet (0.5 mg total) by mouth 2 (two) times daily as needed (tinnitis).     amitriptyline 25 MG tablet  Commonly known as: ELAVIL  Take 2 tablets (50 mg total) by mouth every evening.     amLODIPine 2.5 MG tablet  Commonly known as: NORVASC  Take 5 mg by mouth once daily.     aspirin 81 MG EC tablet  Commonly known as: ECOTRIN  Take 81 mg by mouth once daily.     BD ALCOHOL SWABS Padm  Generic drug: alcohol swabs     blood sugar diagnostic Strp  Commonly known as: ONETOUCH ULTRA TEST  USE ONE STRIP TO CHECK GLUCOSE EVERY DAY     brimonidine 0.2% 0.2 % Drop  Commonly known as: ALPHAGAN  INSTILL 1 DROP INTO EACH EYE TWICE DAILY     carvediloL 6.25 MG tablet  Commonly known as: COREG     clindamycin 300 MG capsule  Commonly known as: CLEOCIN  Take 1 capsule (300 mg total) by mouth every 6 (six) hours.     cloNIDine 0.1 MG tablet  Commonly known as: CATAPRES  Take 0.5 mg by mouth once daily.     clopidogreL 75 mg tablet  Commonly known as: PLAVIX  once daily.     escitalopram oxalate 10 MG tablet  Commonly known as: LEXAPRO  Take 10 mg by mouth once daily.     gabapentin 600 MG tablet  Commonly known as: NEURONTIN  Take 1 tablet (600 mg total) by mouth 2 (two) times daily.     glimepiride 4 MG tablet  Commonly known as: AMARYL  Take 1 tablet (4 mg total) by mouth 2 (two) times daily with meals.     * HYDROcodone-acetaminophen 5-325 mg per tablet  Commonly known as: NORCO  Take 1 tablet by mouth every 12 (twelve) hours as needed.     * HYDROcodone-acetaminophen 5-325 mg per tablet  Commonly known as: NORCO  Take 1 tablet by mouth every 6 (six) hours as needed for Pain.     linaGLIPtin 5 mg Tab tablet  Commonly known as: TRADJENTA  Take 1 tablet (5 mg total) by mouth once daily.     loperamide 2 mg Tab  Commonly  known as: IMODIUM A-D  Take 2 mg by mouth once daily.     LORazepam 0.5 MG tablet  Commonly known as: ATIVAN  Take 0.5 mg by mouth every evening.     omeprazole 40 MG capsule  Commonly known as: PRILOSEC  Take 1 capsule (40 mg total) by mouth once daily.     pantoprazole 40 MG tablet  Commonly known as: PROTONIX  Take 40 mg by mouth once daily.     TOLAK 4 % Crea  Generic drug: fluorouraciL  Apply 1 application topically once daily. To arms and tops of hands     VITAMIN D ORAL  Take by mouth.         * This list has 2 medication(s) that are the same as other medications prescribed for you. Read the directions carefully, and ask your doctor or other care provider to review them with you.                Barbara Mckeon MD  General Surgery  [facility]

## 2020-09-27 NOTE — PLAN OF CARE
POC reviewed w/ pt, verbalized understanding. Pt AAOx4. VSS on RA. No c/o pain overnight. Pt empties own ostomy, putting out grainy, dark brown/greenish stool. Pt up to restroom independently w/ adequate UOP overnight. Pt tolerated diet. Pt denies any N/V overnight. Pt slept b/w care. Frequent rounds made for pt safety. Call light in reach. Bed in lowest position and locked. WCTM.

## 2020-09-27 NOTE — NURSING
Administered the scheduled Dicyclomine. After 15-20 mins patient complained of dizziness and sweating. Team informed about the situation. Was the common side effect of the medicine.

## 2020-09-27 NOTE — SUBJECTIVE & OBJECTIVE
Interval History:   No acute events overnight.   Feels much better than yesterday. Left side with less pain. Has minor headache.   Feeling like getting back to normal.  Denies nausea/vomiting.      Medications:  Continuous Infusions:   dextrose 5 % and 0.45 % NaCl with KCl 20 mEq 125 mL/hr at 09/27/20 0040     Scheduled Meds:   allopurinoL  450 mg Oral Daily    amitriptyline  50 mg Oral QHS    amLODIPine  5 mg Oral Daily    carvediloL  6.25 mg Oral BID WM    cloNIDine  0.5 mg Oral Daily    clopidogreL  75 mg Oral Daily    dicyclomine  10 mg Oral TID WM    enoxaparin  40 mg Subcutaneous Q24H    lidocaine (PF) 10 mg/ml (1%)  1 mL Other Once    LORazepam  0.5 mg Oral QHS     PRN Meds:acetaminophen, dextrose 50%, dextrose 50%, glucagon (human recombinant), glucose, glucose, insulin aspart U-100, melatonin, ondansetron, oxyCODONE, oxyCODONE, promethazine (PHENERGAN) IVPB, sodium chloride 0.9%     Review of patient's allergies indicates:  No Known Allergies  Objective:     Vital Signs (Most Recent):  Temp: 97.8 °F (36.6 °C) (09/27/20 0737)  Pulse: 73 (09/27/20 0737)  Resp: 13 (09/27/20 0737)  BP: (!) 153/90 (09/27/20 0737)  SpO2: 97 % (09/27/20 0737) Vital Signs (24h Range):  Temp:  [97.4 °F (36.3 °C)-98.2 °F (36.8 °C)] 97.8 °F (36.6 °C)  Pulse:  [63-74] 73  Resp:  [13-18] 13  SpO2:  [94 %-97 %] 97 %  BP: (103-160)/(68-90) 153/90     Weight: 98.9 kg (218 lb 0.6 oz)  Body mass index is 27.99 kg/m².    Intake/Output - Last 3 Shifts       09/25 0700 - 09/26 0659 09/26 0700 - 09/27 0659 09/27 0700 - 09/28 0659    I.V. (mL/kg)  2495.8 (25.2)     Total Intake(mL/kg)  2495.8 (25.2)     Urine (mL/kg/hr) 0 1160 (0.5)     Drains 50 150     Stool 150 500     Total Output 200 1810     Net -200 +685.8            Urine Occurrence 300 x 1 x     Stool Occurrence 0 x 0 x         Physical Exam  Constitutional:       General: He is not in acute distress.     Appearance: Normal appearance. He is not ill-appearing or  toxic-appearing.   HENT:      Head: Normocephalic and atraumatic.      Nose:      Comments: NG tube in place     Mouth/Throat:      Mouth: Mucous membranes are moist.      Pharynx: Oropharynx is clear.   Eyes:      Extraocular Movements: Extraocular movements intact.   Cardiovascular:      Rate and Rhythm: Tachycardia present.      Pulses: Normal pulses.   Pulmonary:      Effort: Pulmonary effort is normal. No respiratory distress.      Breath sounds: No stridor. No wheezing.   Abdominal:      General: There is distension (mildly distended).      Tenderness: There is no abdominal tenderness. There is no guarding.      Hernia: A hernia (known parastomal hernia) is present.      Comments: Ileostomy in place on right abdomen, with effluent and gas in bag   Skin:     General: Skin is warm and dry.      Capillary Refill: Capillary refill takes less than 2 seconds.      Coloration: Skin is not jaundiced.   Neurological:      General: No focal deficit present.      Mental Status: He is alert.   Psychiatric:         Mood and Affect: Mood normal.         Behavior: Behavior normal.      Significant Labs:  CBC:   Recent Labs   Lab 09/26/20  0536   WBC 5.69   RBC 4.23*   HGB 12.1*   HCT 38.1*   *   MCV 90   MCH 28.6   MCHC 31.8*     BMP:   Recent Labs   Lab 09/26/20  0536   *      K 3.5      CO2 25   BUN 16   CREATININE 1.3   CALCIUM 8.9   MG 1.7       Significant Diagnostics:  I have reviewed and interpreted all pertinent imaging results/findings within the past 24 hours.

## 2020-09-27 NOTE — TELEPHONE ENCOUNTER
Pt calling regarding low BP. Current BP is 77/59 and pt reports dizziness. Per protocol, advised pt to call 911. Pt states he's fine. Again advised pt to call 911. Pt states he will call his ride to bring him to the ED.     Reason for Disposition   [1] Systolic BP < 90 AND [2] dizzy, lightheaded, or weak    Additional Information   Negative: Started suddenly after an allergic medicine, an allergic food, or bee sting   Negative: Shock suspected (e.g., cold/pale/clammy skin, too weak to stand, low BP, rapid pulse)   Negative: Difficult to awaken or acting confused (e.g., disoriented, slurred speech)   Negative: Fainted    Protocols used: LOW BLOOD PRESSURE-A-AH

## 2020-09-27 NOTE — PLAN OF CARE
POC reviewed. AAOx4 on RA,  VSS on room air. Complaints of headache and left sided abdominal pain. No complaints of SOB or dizziness. Independent to restroom and emptying own ostomy output. Diet advanced from NPO to clear liquid diet, discontinued the NG tube today.  No complaints of N/V. Pain well controlled with ordered pain medications. Resting quietly with side rails up and call light within reach.

## 2020-09-27 NOTE — PROGRESS NOTES
[facility]  General Surgery  Progress Note    Subjective:     History of Present Illness:  No notes on file    Post-Op Info:  * No surgery found *         Interval History:   No acute events overnight.   Feels much better than yesterday. Left side with less pain. Has minor headache.   Feeling like getting back to normal.  Denies nausea/vomiting.      Medications:  Continuous Infusions:   dextrose 5 % and 0.45 % NaCl with KCl 20 mEq 125 mL/hr at 09/27/20 0040     Scheduled Meds:   allopurinoL  450 mg Oral Daily    amitriptyline  50 mg Oral QHS    amLODIPine  5 mg Oral Daily    carvediloL  6.25 mg Oral BID WM    cloNIDine  0.5 mg Oral Daily    clopidogreL  75 mg Oral Daily    dicyclomine  10 mg Oral TID WM    enoxaparin  40 mg Subcutaneous Q24H    lidocaine (PF) 10 mg/ml (1%)  1 mL Other Once    LORazepam  0.5 mg Oral QHS     PRN Meds:acetaminophen, dextrose 50%, dextrose 50%, glucagon (human recombinant), glucose, glucose, insulin aspart U-100, melatonin, ondansetron, oxyCODONE, oxyCODONE, promethazine (PHENERGAN) IVPB, sodium chloride 0.9%     Review of patient's allergies indicates:  No Known Allergies  Objective:     Vital Signs (Most Recent):  Temp: 97.8 °F (36.6 °C) (09/27/20 0737)  Pulse: 73 (09/27/20 0737)  Resp: 13 (09/27/20 0737)  BP: (!) 153/90 (09/27/20 0737)  SpO2: 97 % (09/27/20 0737) Vital Signs (24h Range):  Temp:  [97.4 °F (36.3 °C)-98.2 °F (36.8 °C)] 97.8 °F (36.6 °C)  Pulse:  [63-74] 73  Resp:  [13-18] 13  SpO2:  [94 %-97 %] 97 %  BP: (103-160)/(68-90) 153/90     Weight: 98.9 kg (218 lb 0.6 oz)  Body mass index is 27.99 kg/m².    Intake/Output - Last 3 Shifts       09/25 0700 - 09/26 0659 09/26 0700 - 09/27 0659 09/27 0700 - 09/28 0659    I.V. (mL/kg)  2495.8 (25.2)     Total Intake(mL/kg)  2495.8 (25.2)     Urine (mL/kg/hr) 0 1160 (0.5)     Drains 50 150     Stool 150 500     Total Output 200 1810     Net -200 +685.8            Urine Occurrence 300 x 1 x     Stool  Occurrence 0 x 0 x         Physical Exam  Constitutional:       General: He is not in acute distress.     Appearance: Normal appearance. He is not ill-appearing or toxic-appearing.   HENT:      Head: Normocephalic and atraumatic.      Nose:      Comments:      Mouth/Throat:      Mouth: Mucous membranes are moist.      Pharynx: Oropharynx is clear.   Eyes:      Extraocular Movements: Extraocular movements intact.   Cardiovascular:      Rate and Rhythm: Regular rate and rhythm.     Pulses: Normal pulses.   Pulmonary:      Effort: Pulmonary effort is normal. No respiratory distress.      Breath sounds: No stridor. No wheezing.   Abdominal:      General: There is no distension     Tenderness: There is no abdominal tenderness. There is no guarding.      Hernia: A hernia (known parastomal hernia) is present.      Comments: Ileostomy in place on right abdomen, with effluent and gas in bag   Skin:     General: Skin is warm and dry.      Capillary Refill: Capillary refill takes less than 2 seconds.      Coloration: Skin is not jaundiced.   Neurological:      General: No focal deficit present.      Mental Status: He is alert.   Psychiatric:         Mood and Affect: Mood normal.         Behavior: Behavior normal.      Significant Labs:  CBC:   Recent Labs   Lab 09/26/20  0536   WBC 5.69   RBC 4.23*   HGB 12.1*   HCT 38.1*   *   MCV 90   MCH 28.6   MCHC 31.8*     BMP:   Recent Labs   Lab 09/26/20  0536   *      K 3.5      CO2 25   BUN 16   CREATININE 1.3   CALCIUM 8.9   MG 1.7       Significant Diagnostics:  I have reviewed and interpreted all pertinent imaging results/findings within the past 24 hours.    Assessment/Plan:     * Partial small bowel obstruction  Mr. Charlton a 70 y.o. male with history of UC, s/p TPC with end ileostomy in 1985 with partial small bowel obstruction.    No surgical intervention at this time. Improved with NG placement and rest.  Advance to regular diet.  Having bowel  function. Continue to monitor output.   Activity as tolerated  D/c IVF  Begin Bentyl     Dispo: Possible discharge this afternoon if tolerating diet        Barbara Mckeon MD  General Surgery  [facility]

## 2020-09-28 ENCOUNTER — PATIENT OUTREACH (OUTPATIENT)
Dept: ADMINISTRATIVE | Facility: CLINIC | Age: 70
End: 2020-09-28

## 2020-09-28 NOTE — TELEPHONE ENCOUNTER
Pt. Had small bowel obstruction procedure on Friday 9/25, pt. Is at home recovering, has ileostomy placed. No bowel movement yet, pt. C/o dizziness and off balance. Has f/u on 10/9 in office. Please advise.      BP 96/68 about 2 hours

## 2020-09-28 NOTE — PATIENT INSTRUCTIONS
Small Bowel Obstruction     Small bowel obstruction can lead to tissue damage and even tissue death.   A small bowel obstruction occurs when part or all of the small intestine (bowel) is blocked. As a result, digestive contents cant move through the bowel properly and out of the body. Treatment is needed right away to remove the blockage. This can ease painful symptoms. It can also prevent serious problems, such as tissue death or bursting (rupture) of the small bowel. Without treatment, a small bowel obstruction can be fatal.  Causes of small bowel obstruction  A small bowel obstruction can be caused by:  · Scar tissue (adhesions). These may form after belly (abdominal) surgery or an infection.  · Hernia. A hernia is when an organ pushes through a weak spot or tear in the abdomen wall. Part of the small bowel can push out and be seen as a bulge under the belly. Hernias can also occur internally.  · Certain health problems. These include when part of the bowel slides inside another part (intussusception). Other causes include irritable bowel disease such as Crohns disease, and inflammation and sores in the intestine (ulcerative colitis).  · Abnormal tissue growths (tumors). These can form on the inside or outside of the small bowel. They are usually due to cancer.  Symptoms of small bowel obstruction  Common symptoms include:  · Belly cramping and pain  · Belly swelling and bloating  · Upset stomach (nausea) and vomiting  · Can't  pass gas  · Can't pass stool (constipation)  · Diarrhea  Diagnosing small bowel obstruction  Your provider will ask about your symptoms and health history. Youll also have a physical exam. Tests may also be done to confirm the problem. These can include:  · Imaging tests. These provide pictures of the small bowel. Common tests include X-rays and a CT scan.  · Blood tests. These check for infection and other problems, such as excess fluid loss (dehydration).  · Upper GI  (gastrointestinal) series with a small bowel follow-through. This test takes X-rays of the upper digestive tract from the mouth through the small bowel. An X-ray dye (contrast fluid) is used. The dye coats the inside of your upper digestive tract so it will show up clearly on X-rays.  Treating small bowel obstruction  Treatment takes place in a hospital. As part of your care, the following may be done:  · No food or drink is given by mouth. This allows your bowels to rest.  · An IV (intravenous) line is placed in a vein in your arm or hand. The IV line is used to give fluids. It may also be used to give medicines. These may be needed to ease pain, nausea, and other symptoms. They may also be needed to treat or prevent infections.  · A soft, thin, flexible tube (nasogastric tube) is inserted through your nose and into your stomach. The tube is used to remove extra gas and fluid in your stomach and bowels. This helps to ease symptoms such as pain and swelling.  · In severe cases, surgery is done. This may be needed if the small bowel is almost or totally blocked, or there is a hole in the bowel (bowel perforation). During surgery, the blockage is removed. Parts of the bowel may also be removed if there is tissue death. Other repair may be done as well, depending on what caused the blockage. Your healthcare provider will give you more information about surgery, if needed.  · Youll be watched closely in the hospital until your symptoms improve. Your provider will tell you when you can go home.  Long-term concerns   After treatment, most people recover with no lasting effects. If a long part of the bowel is removed, there is a greater chance for lifelong digestive problems. Bowel movements may become irregular. Work with your provider to learn the best ways to manage any symptoms you may have, and to protect your health.  When to call your healthcare provider  Call your provider right away if you have any of the  following:  · Severe pain (Call 911)  · Belly swelling or cramping that wont go away  · Cant pass stool or gas  · Nausea or vomiting (especially if the vomit looks or smells like stool)   Date Last Reviewed: 7/1/2016  © 3051-8903 [employer]. [address] [place] [address], [place] [address]. All rights reserved. This information is not intended as a substitute for professional medical care. Always follow your healthcare professional's instructions.

## 2020-09-29 ENCOUNTER — PATIENT OUTREACH (OUTPATIENT)
Dept: ADMINISTRATIVE | Facility: OTHER | Age: 70
End: 2020-09-29

## 2020-09-29 NOTE — TELEPHONE ENCOUNTER
Pt. BP today 103/70  And Heart Rate 78, ileostomy working pt. Had fecal matter over night in his bag.

## 2020-09-29 NOTE — TELEPHONE ENCOUNTER
Rescheduled apt. For next Friday 10/9 in the am to 10/2 at 3:45 pm. Pt. States he had an eye doctor apt. That morning in [place] but he can see us that afternoon. Pt. Verbalized understanding.

## 2020-09-30 ENCOUNTER — PATIENT OUTREACH (OUTPATIENT)
Dept: ADMINISTRATIVE | Facility: HOSPITAL | Age: 70
End: 2020-09-30

## 2020-09-30 ENCOUNTER — OFFICE VISIT (OUTPATIENT)
Dept: DIABETES | Facility: CLINIC | Age: 70
End: 2020-09-30
Payer: MEDICARE

## 2020-09-30 VITALS
WEIGHT: 218 LBS | OXYGEN SATURATION: 98 % | HEIGHT: 74 IN | BODY MASS INDEX: 27.98 KG/M2 | HEART RATE: 91 BPM | DIASTOLIC BLOOD PRESSURE: 108 MMHG | SYSTOLIC BLOOD PRESSURE: 160 MMHG

## 2020-09-30 DIAGNOSIS — E11.22 TYPE 2 DIABETES MELLITUS WITH STAGE 2 CHRONIC KIDNEY DISEASE, WITHOUT LONG-TERM CURRENT USE OF INSULIN: ICD-10-CM

## 2020-09-30 DIAGNOSIS — E11.42 TYPE 2 DIABETES MELLITUS WITH DIABETIC POLYNEUROPATHY, WITHOUT LONG-TERM CURRENT USE OF INSULIN: ICD-10-CM

## 2020-09-30 DIAGNOSIS — E11.9 TYPE 2 DIABETES MELLITUS WITHOUT COMPLICATION, WITHOUT LONG-TERM CURRENT USE OF INSULIN: ICD-10-CM

## 2020-09-30 DIAGNOSIS — N18.2 TYPE 2 DIABETES MELLITUS WITH STAGE 2 CHRONIC KIDNEY DISEASE, WITHOUT LONG-TERM CURRENT USE OF INSULIN: ICD-10-CM

## 2020-09-30 DIAGNOSIS — G72.9 MYOPATHY: ICD-10-CM

## 2020-09-30 DIAGNOSIS — I10 ELEVATED BLOOD PRESSURE READING IN OFFICE WITH DIAGNOSIS OF HYPERTENSION: ICD-10-CM

## 2020-09-30 DIAGNOSIS — E11.649 TYPE 2 DIABETES MELLITUS WITH HYPOGLYCEMIA WITHOUT COMA, WITHOUT LONG-TERM CURRENT USE OF INSULIN: ICD-10-CM

## 2020-09-30 DIAGNOSIS — I10 ESSENTIAL HYPERTENSION: ICD-10-CM

## 2020-09-30 DIAGNOSIS — Z59.9 FINANCIAL DIFFICULTIES: ICD-10-CM

## 2020-09-30 DIAGNOSIS — I25.10 CORONARY ARTERY DISEASE, ANGINA PRESENCE UNSPECIFIED, UNSPECIFIED VESSEL OR LESION TYPE, UNSPECIFIED WHETHER NATIVE OR TRANSPLANTED HEART: ICD-10-CM

## 2020-09-30 DIAGNOSIS — E78.5 HYPERLIPIDEMIA, UNSPECIFIED HYPERLIPIDEMIA TYPE: ICD-10-CM

## 2020-09-30 DIAGNOSIS — N18.30 TYPE 2 DIABETES MELLITUS WITH STAGE 3 CHRONIC KIDNEY DISEASE, WITHOUT LONG-TERM CURRENT USE OF INSULIN: Primary | ICD-10-CM

## 2020-09-30 DIAGNOSIS — N18.30 CKD (CHRONIC KIDNEY DISEASE) STAGE 3, GFR 30-59 ML/MIN: ICD-10-CM

## 2020-09-30 DIAGNOSIS — E11.22 TYPE 2 DIABETES MELLITUS WITH STAGE 3 CHRONIC KIDNEY DISEASE, WITHOUT LONG-TERM CURRENT USE OF INSULIN: Primary | ICD-10-CM

## 2020-09-30 PROBLEM — N17.9 ACUTE RENAL FAILURE SUPERIMPOSED ON STAGE 3 CHRONIC KIDNEY DISEASE: Status: RESOLVED | Noted: 2019-12-28 | Resolved: 2020-09-30

## 2020-09-30 PROBLEM — E11.29 DM (DIABETES MELLITUS) TYPE II CONTROLLED WITH RENAL MANIFESTATION: Status: RESOLVED | Noted: 2019-12-28 | Resolved: 2020-09-30

## 2020-09-30 PROCEDURE — 3008F BODY MASS INDEX DOCD: CPT | Mod: CPTII,S$GLB,, | Performed by: NURSE PRACTITIONER

## 2020-09-30 PROCEDURE — 99214 OFFICE O/P EST MOD 30 MIN: CPT | Mod: S$GLB,,, | Performed by: NURSE PRACTITIONER

## 2020-09-30 PROCEDURE — 1125F AMNT PAIN NOTED PAIN PRSNT: CPT | Mod: S$GLB,,, | Performed by: NURSE PRACTITIONER

## 2020-09-30 PROCEDURE — 3077F SYST BP >= 140 MM HG: CPT | Mod: CPTII,S$GLB,, | Performed by: NURSE PRACTITIONER

## 2020-09-30 PROCEDURE — 3044F HG A1C LEVEL LT 7.0%: CPT | Mod: CPTII,S$GLB,, | Performed by: NURSE PRACTITIONER

## 2020-09-30 PROCEDURE — 3077F PR MOST RECENT SYSTOLIC BLOOD PRESSURE >= 140 MM HG: ICD-10-PCS | Mod: CPTII,S$GLB,, | Performed by: NURSE PRACTITIONER

## 2020-09-30 PROCEDURE — 99999 PR PBB SHADOW E&M-EST. PATIENT-LVL IV: ICD-10-PCS | Mod: PBBFAC,,, | Performed by: NURSE PRACTITIONER

## 2020-09-30 PROCEDURE — 1159F PR MEDICATION LIST DOCUMENTED IN MEDICAL RECORD: ICD-10-PCS | Mod: S$GLB,,, | Performed by: NURSE PRACTITIONER

## 2020-09-30 PROCEDURE — 1125F PR PAIN SEVERITY QUANTIFIED, PAIN PRESENT: ICD-10-PCS | Mod: S$GLB,,, | Performed by: NURSE PRACTITIONER

## 2020-09-30 PROCEDURE — 1101F PT FALLS ASSESS-DOCD LE1/YR: CPT | Mod: CPTII,S$GLB,, | Performed by: NURSE PRACTITIONER

## 2020-09-30 PROCEDURE — 3080F DIAST BP >= 90 MM HG: CPT | Mod: CPTII,S$GLB,, | Performed by: NURSE PRACTITIONER

## 2020-09-30 PROCEDURE — 99999 PR PBB SHADOW E&M-EST. PATIENT-LVL IV: CPT | Mod: PBBFAC,,, | Performed by: NURSE PRACTITIONER

## 2020-09-30 PROCEDURE — 3008F PR BODY MASS INDEX (BMI) DOCUMENTED: ICD-10-PCS | Mod: CPTII,S$GLB,, | Performed by: NURSE PRACTITIONER

## 2020-09-30 PROCEDURE — 1159F MED LIST DOCD IN RCRD: CPT | Mod: S$GLB,,, | Performed by: NURSE PRACTITIONER

## 2020-09-30 PROCEDURE — 1101F PR PT FALLS ASSESS DOC 0-1 FALLS W/OUT INJ PAST YR: ICD-10-PCS | Mod: CPTII,S$GLB,, | Performed by: NURSE PRACTITIONER

## 2020-09-30 PROCEDURE — 3080F PR MOST RECENT DIASTOLIC BLOOD PRESSURE >= 90 MM HG: ICD-10-PCS | Mod: CPTII,S$GLB,, | Performed by: NURSE PRACTITIONER

## 2020-09-30 PROCEDURE — 99214 PR OFFICE/OUTPT VISIT, EST, LEVL IV, 30-39 MIN: ICD-10-PCS | Mod: S$GLB,,, | Performed by: NURSE PRACTITIONER

## 2020-09-30 PROCEDURE — 3044F PR MOST RECENT HEMOGLOBIN A1C LEVEL <7.0%: ICD-10-PCS | Mod: CPTII,S$GLB,, | Performed by: NURSE PRACTITIONER

## 2020-09-30 RX ORDER — ROSUVASTATIN CALCIUM 10 MG/1
10 TABLET, COATED ORAL NIGHTLY
Qty: 90 TABLET | Refills: 1 | Status: SHIPPED | OUTPATIENT
Start: 2020-09-30 | End: 2020-11-03 | Stop reason: SDUPTHER

## 2020-09-30 RX ORDER — GLIMEPIRIDE 4 MG/1
4 TABLET ORAL
Qty: 90 TABLET | Refills: 1 | Status: SHIPPED | OUTPATIENT
Start: 2020-09-30 | End: 2021-09-30

## 2020-09-30 RX ORDER — PRAVASTATIN SODIUM 80 MG/1
80 TABLET ORAL DAILY
COMMUNITY
End: 2020-09-30 | Stop reason: SINTOL

## 2020-09-30 RX ORDER — ROSUVASTATIN CALCIUM 10 MG/1
10 TABLET, COATED ORAL NIGHTLY
Qty: 10 TABLET | Refills: 0 | Status: SHIPPED | OUTPATIENT
Start: 2020-09-30 | End: 2021-04-01 | Stop reason: CLARIF

## 2020-09-30 SDOH — SOCIAL DETERMINANTS OF HEALTH (SDOH): PROBLEM RELATED TO HOUSING AND ECONOMIC CIRCUMSTANCES, UNSPECIFIED: Z59.9

## 2020-09-30 NOTE — LETTER
September 30, 2020      Amina Baer NP  [address] [place] [address]           [facility]  [address], [facility] [address]  [place] [address]  Phone: [phone]  Fax: [phone]          Patient: Jarrett Charlton Jr.   MR Number: [id number]   YOB: 1950   Date of Visit: 9/30/2020       Dear Amina Baer:    Thank you for referring Jarrett Charlton to me for evaluation. Attached you will find relevant portions of my assessment and plan of care.    If you have questions, please do not hesitate to call me. I look forward to following Jarrett Charlton along with you.    Sincerely,    Alexandrea Purdy NP    Enclosure  CC:  No Recipients    If you would like to receive this communication electronically, please contact [email or web address] or [phone] to request more information on [employer] Link access.    For providers and/or their staff who would like to refer a patient to [facility], please contact us through our one-stop-shop provider referral line, [facility], at [phone].    If you feel you have received this communication in error or would no longer like to receive these types of communications, please e-mail [email or web address]

## 2020-09-30 NOTE — ASSESSMENT & PLAN NOTE
A1c is below goal for age and co morbidities  Concerns for hypoglycemia   Cost is an issue with the Tradjenta as he is in the coverage gap  Will place a referral to pharmacy assistance to see if he qualifies for additional assistance with the Tradjenta.  Will get professional CGM to evaluate BG readings         -- Medication Changes:   Cut back on Glimepiride to 4 mg daily with breakfast ( prevent hypoglycemia)--- discussed the importance of taking this medication with food and eating 3 meals per day to prevent hypoglycemia.  Will try to get pharmacy assistance for the Tradjenta      -- Reviewed goals of therapy are to get the best control we can without hypoglycemia.  -- Advised frequent self blood glucose monitoring.  Patient encouraged to document glucose results and bring them to every clinic visit. 2 times per day at alternating times.   -- Hypoglycemia precautions discussed. Instructed on precautions before driving.    -- Call for Bg repeatedly < 90 or > 180.   -- Close adherence to lifestyle changes recommended.   -- Periodic follow ups for eye evaluations, foot care and dental care suggested.  -- Refer to diabetes education- professional CGM     -- message sent to [facility] to get eye records.

## 2020-09-30 NOTE — PATIENT INSTRUCTIONS
Stop pravastatin and change to crestor to see if this will help with the muscle cramping     Cut back on your Glimepiride to 4 mg daily with breakfast     We will get a professional CGM in a couple of weeks

## 2020-09-30 NOTE — ASSESSMENT & PLAN NOTE
Optimize blood sugar readings without causing hypoglycemia  [employer] are not ideal but are cost efficient for patient  Cut back on glimepiride to once a day  Continue to follow with Nephrology

## 2020-09-30 NOTE — LETTER
AUTHORIZATION FOR RELEASE OF   CONFIDENTIAL INFORMATION    Dear [facility],    We are seeing Jarrett Charlton Jr., date of birth 1950, in the clinic at [facility]. Poli Gonzalez MD is the patient's PCP. Jarrett Charlton Jr. has an outstanding lab/procedure at the time we reviewed his chart. In order to help keep his health information updated, he has authorized us to request the following medical record(s):        (  )  MAMMOGRAM                                      (  )  COLONOSCOPY      (  )  PAP SMEAR                                          (  )  OUTSIDE LAB RESULTS     (  )  DEXA SCAN                                          ( x )  EYE EXAM            (  )  FOOT EXAM                                          (  )  ENTIRE RECORD     (  )  OUTSIDE IMMUNIZATIONS                 (  )  _______________         Please fax records to [facility], John T Thien, MD, [phone]     If you have any questions, please contact [facility] at [phone].           Patient Name: Jarrett Charlton Jr.  : 1950  Patient Phone #: [phone]

## 2020-09-30 NOTE — ASSESSMENT & PLAN NOTE
BP goal is < 140/90.    Blood pressure goals discussed with patient  Markedly above goal in clinic today  Reports recently having blood pressure medications adjusted per Cardiology due to dizziness and hypotension  Has a follow-up appointment with PCP this week--will defer to PCP and Cardiology for blood pressure management

## 2020-09-30 NOTE — ASSESSMENT & PLAN NOTE
On statin per ADA recommendations  LDL goal < 100. LDL at goal. LFTs WNL.  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

## 2020-09-30 NOTE — PROGRESS NOTES
CC:   Chief Complaint   Patient presents with    Diabetes Mellitus     Pts BG was 141 this am       HPI: Jarrett Charlton Jr. is a 70 y.o. male presents for an initial visit today for the management of T2DM    He was diagnosed with Type 2 diabetes around [id number] on routine lab work and he was started on Metformin, which was later discontinued due to GI upset. He has never been on insulin therapy.     Diabetes:   He stopped his Tradjenta 1 month ago due to the cost. He is in the coverage gap and reports a 90 day supply was going to cost him 400 dollars.  He cannot afford this.  He has continued on the glimepiride 4 mg b.i.d. he does attest to occasionally having hypoglycemia or near hypoglycemia, particularly in the afternoon when he is out working in the yard or in the early morning when he wakes up.  He reports that his wife passed away about 8 weeks ago--he still grieving her loss.  Does attest to dietary indiscretions due to this.  Eating more fast food.    HTN:  Blood pressure is markedly above goal in clinic today.  He reports he took his blood pressure medication right before this visit.  He does attest to a lot of arthritis pain today-which a he attributes his elevated blood pressure to.  Reports compliance with his medication regimen.  At home he monitors his blood pressure and it normally runs 110-120's/80-90's     CKD stage 3:  Improving. new sCr baseline 1.4-1.6 mg/dL.  Recent creatinine 1.3 Following with Nephrology.  Was seen last in September 2020.    Cholesterol:  He is currently on high intensity statin therapy pravastatin 80 mg nightly.  Reports severe myopathy, keeping him up at night.  Has tried Lipitor in the past prior to pravastatin.    Neuropathy- on gabapentin    Family hx of diabetes: father   Hospitalized for diabetes: denies     No personal or FH of thyroid cancer or personal of pancreatic cancer or pancreatitis.     DIABETES COMPLICATIONS: nephropathy and peripheral neuropathy    CKD  stage 3 - following with nephrology     Diabetes Management Status    ASA:  Yes - 81 mg daily     Statin: Taking -- pravastatin-- he was on Lipitor prior to that   ACE/ARB: Not taking    Screening or Prevention Patient's value Goal Complete/Controlled?   HgA1C Testing and Control   Lab Results   Component Value Date    HGBA1C 6.4 (H) 09/26/2020      Annually/Less than 8% Yes   Lipid profile : 04/20/2020 Annually Yes   LDL control Lab Results   Component Value Date    LDLCALC 78 04/20/2020    Annually/Less than 100 mg/dl  Yes   Nephropathy screening Lab Results   Component Value Date    LABMICR 10.0 01/17/2017     Lab Results   Component Value Date    PROTEINUA Negative 09/25/2020    Annually Yes   Blood pressure BP Readings from Last 1 Encounters:   09/30/20 (!) 160/108    Less than 140/90 No   Dilated retinal exam : 02/20/2019- last week - [facility] [address] - Dr. Sahu  Annually No   Foot exam   : 09/30/2020 Annually No       CURRENT A1C:    Hemoglobin A1C   Date Value Ref Range Status   09/26/2020 6.4 (H) 4.0 - 5.6 % Final     Comment:     ADA Screening Guidelines:  5.7-6.4%  Consistent with prediabetes  >or=6.5%  Consistent with diabetes  High levels of fetal hemoglobin interfere with the HbA1C  assay. Heterozygous hemoglobin variants (HbS, HgC, etc)do  not significantly interfere with this assay.   However, presence of multiple variants may affect accuracy.     04/20/2020 6.7 (H) 4.0 - 5.6 % Final     Comment:     ADA Screening Guidelines:  5.7-6.4%  Consistent with prediabetes  >or=6.5%  Consistent with diabetes  High levels of fetal hemoglobin interfere with the HbA1C  assay. Heterozygous hemoglobin variants (HbS, HgC, etc)do  not significantly interfere with this assay.   However, presence of multiple variants may affect accuracy.     02/10/2020 7.0 (H) 4.0 - 5.6 % Final     Comment:     ADA Screening Guidelines:  5.7-6.4%  Consistent with prediabetes  >or=6.5%  Consistent with  diabetes  High levels of fetal hemoglobin interfere with the HbA1C  assay. Heterozygous hemoglobin variants (HbS, HgC, etc)do  not significantly interfere with this assay.   However, presence of multiple variants may affect accuracy.         GOAL A1C: 7% without hypoglycemia     DM MEDICATIONS USED IN THE PAST: Metformin - GI upset   Tradjenta --- off due cost- donut hole   Glimepiride     CURRENT DIABETES MEDICATIONS: Glimepiride 4 mg BID     Insulin: N/A   Missed doses: denies       BLOOD GLUCOSE MONITORING:  He checks his BG BID   No logs or meter to review   Per oral recall   118 last night   FBG: on average 130's-150's -- sometimes 68 or 76  Evenin's       HYPOGLYCEMIA:  Yes-- occ dropping in the morning to 68 or 76  occ drops to the 80's in the afternoon when he skips lunch and is active in the yard       MEALS: eating 3 meals per day   BF: eggs, grits, hash browns, cereal, oatmeal   Lunch: sandwich on whole wheat bread   Dinner: fast food   Snack: coke zero - un sweet tea with equal   Drinks: mostly water      CURRENT EXERCISE:  Exercise bike 3-4 times per week     Review of Systems  Review of Systems   Constitutional: Positive for fatigue. Negative for appetite change and unexpected weight change.   HENT: Negative for trouble swallowing.    Eyes: Negative for visual disturbance.   Respiratory: Negative for shortness of breath.    Cardiovascular: Negative for chest pain.   Gastrointestinal: Negative for nausea.   Endocrine: Negative for polydipsia, polyphagia and polyuria.   Genitourinary:        No Nocturia    Musculoskeletal: Positive for arthralgias and myalgias (legs cramping ).   Skin: Negative for wound.   Neurological: Positive for numbness.   Psychiatric/Behavioral: Positive for dysphoric mood.       Physical Exam   Physical Exam  Vitals signs and nursing note reviewed.   Constitutional:       Appearance: He is well-developed.   HENT:      Head: Normocephalic and atraumatic.      Right Ear:  External ear normal.      Left Ear: External ear normal.      Nose: Nose normal.   Neck:      Musculoskeletal: Normal range of motion and neck supple.      Thyroid: No thyromegaly.      Trachea: No tracheal deviation.   Cardiovascular:      Rate and Rhythm: Normal rate and regular rhythm.      Heart sounds: No murmur.   Pulmonary:      Effort: Pulmonary effort is normal. No respiratory distress.      Breath sounds: Normal breath sounds.   Abdominal:      Palpations: Abdomen is soft.      Tenderness: There is no abdominal tenderness.      Hernia: No hernia is present.   Musculoskeletal:      Right lower le+ Edema present.      Left lower le+ Edema present.   Skin:     General: Skin is warm and dry.      Capillary Refill: Capillary refill takes less than 2 seconds.      Findings: No rash.      Comments:      Neurological:      Mental Status: He is alert and oriented to person, place, and time.      Cranial Nerves: No cranial nerve deficit.   Psychiatric:         Mood and Affect: Mood is depressed. Affect is tearful.         Behavior: Behavior normal.         Judgment: Judgment normal.      Comments: Crying during our visit regarding his wife's recent passing         FOOT EXAMINATION: Appropriate footwear   Protective Sensation (w/ 10 gram monofilament):  Right: Decreased  Left: Decreased    Visual Inspection:  Normal -  Bilateral, Nails Intact - without Evidence of Foot Deformity- Bilateral and Dry Skin -  Bilateral    Pedal Pulses:   Right: Diminshed  Left: Present    Posterior tibialis:   Right:Diminshed  Left: Present        Lab Results   Component Value Date    TSH 2.09 2018           Type 2 diabetes mellitus with stage 3 chronic kidney disease, without long-term current use of insulin  A1c is below goal for age and co morbidities  Concerns for hypoglycemia   Cost is an issue with the Tradjenta as he is in the coverage gap  Will place a referral to pharmacy assistance to see if he qualifies for  additional assistance with the Tradjenta.  Will get professional CGM to evaluate BG readings         -- Medication Changes:   Cut back on Glimepiride to 4 mg daily with breakfast ( prevent hypoglycemia)--- discussed the importance of taking this medication with food and eating 3 meals per day to prevent hypoglycemia.  Will try to get pharmacy assistance for the Tradjenta      -- Reviewed goals of therapy are to get the best control we can without hypoglycemia.  -- Advised frequent self blood glucose monitoring.  Patient encouraged to document glucose results and bring them to every clinic visit. 2 times per day at alternating times.   -- Hypoglycemia precautions discussed. Instructed on precautions before driving.    -- Call for Bg repeatedly < 90 or > 180.   -- Close adherence to lifestyle changes recommended.   -- Periodic follow ups for eye evaluations, foot care and dental care suggested.  -- Refer to diabetes education- professional CGM     -- message sent to [facility] to get eye records.         Type 2 diabetes mellitus with diabetic polyneuropathy, without long-term current use of insulin  Optimize BG readings.   See above.   On gabapentin    Educated patient to check feet daily for any foreign objects and/or wounds. Discussed with patient the importance of wearing appropriate footwear at all times, not to walk barefoot ever, and to check shoes before putting them on feet. Instructed patient to keep feet dry by regularly changing shoes and socks and drying feet after baths and exercises. Also, instructed patient to report any new lesions, discolorations, or swelling to a healthcare professional.        Essential hypertension  BP goal is < 140/90.    Blood pressure goals discussed with patient  Markedly above goal in clinic today  Reports recently having blood pressure medications adjusted per Cardiology due to dizziness and hypotension  Has a follow-up appointment with PCP this week--will defer to PCP and  Cardiology for blood pressure management    HLD (hyperlipidemia)  On statin per ADA recommendations  LDL goal < 100. LDL at goal. LFTs WNL.  Reports myopathy   Intolerance to Lipitor   Now with intolerance to pravastatin   Will discontinue pravastatin and change to Crestor 10 mg nightly  Lipids with RTC   If myopathy continues--may need to consider Praluent, Repatha or zetia     CKD (chronic kidney disease) stage 3, GFR 30-59 ml/min  Optimize blood sugar readings without causing hypoglycemia  MARTÍNEZ are not ideal but are cost efficient for patient  Cut back on glimepiride to once a day  Continue to follow with Nephrology    CAD (coronary artery disease)  Optimize BG readings.   See above.          Follow up in about 3 months (around 12/30/2020).  Professional CGM in 3 weeks.   F/u with me in 3 months with labs prior         Orders Placed This Encounter   Procedures    Comprehensive metabolic panel     Standing Status:   Future     Standing Expiration Date:   3/30/2022    Lipid Panel     Standing Status:   Future     Standing Expiration Date:   3/30/2022    Hemoglobin A1C     Standing Status:   Future     Standing Expiration Date:   3/30/2022    Ambulatory referral/consult to Pharmacy Assistance     Standing Status:   Future     Standing Expiration Date:   10/30/2021     Referral Priority:   Routine     Referral Type:   Consultation     Referral Reason:   Specialty Services Required     Number of Visits Requested:   1    GLUCOSE MONITORING CONTINUOUS MIN 72 HOURS     Standing Status:   Future     Standing Expiration Date:   9/30/2021       Recommendations were discussed with the patient in detail  The patient verbalized understanding and agrees with the plan outlined as above.     This note was partly generated with [employer] voice recognition software. I apologize for any possible typographical errors.

## 2020-10-01 ENCOUNTER — TELEPHONE (OUTPATIENT)
Dept: DERMATOLOGY | Facility: CLINIC | Age: 70
End: 2020-10-01

## 2020-10-01 NOTE — TELEPHONE ENCOUNTER
Called pt to confirm Mohs procedure for SCC left forearm for 10/5/2020 at 11:30. Did Covid 19 screening, negative. Okay to proceed with Mohs as planned.

## 2020-10-02 ENCOUNTER — OFFICE VISIT (OUTPATIENT)
Dept: PRIMARY CARE CLINIC | Facility: CLINIC | Age: 70
End: 2020-10-02
Payer: MEDICARE

## 2020-10-02 VITALS
TEMPERATURE: 99 F | OXYGEN SATURATION: 100 % | WEIGHT: 216.38 LBS | HEIGHT: 74 IN | RESPIRATION RATE: 18 BRPM | BODY MASS INDEX: 27.77 KG/M2 | DIASTOLIC BLOOD PRESSURE: 98 MMHG | HEART RATE: 90 BPM | SYSTOLIC BLOOD PRESSURE: 160 MMHG

## 2020-10-02 DIAGNOSIS — L03.011 PARONYCHIA OF FINGER OF RIGHT HAND: ICD-10-CM

## 2020-10-02 DIAGNOSIS — I12.9 TYPE 2 DIABETES MELLITUS WITH STAGE 2 CHRONIC KIDNEY DISEASE AND HYPERTENSION: ICD-10-CM

## 2020-10-02 DIAGNOSIS — N18.2 TYPE 2 DIABETES MELLITUS WITH STAGE 2 CHRONIC KIDNEY DISEASE AND HYPERTENSION: ICD-10-CM

## 2020-10-02 DIAGNOSIS — K56.600 PARTIAL SMALL BOWEL OBSTRUCTION: Primary | ICD-10-CM

## 2020-10-02 DIAGNOSIS — E11.22 TYPE 2 DIABETES MELLITUS WITH STAGE 2 CHRONIC KIDNEY DISEASE AND HYPERTENSION: ICD-10-CM

## 2020-10-02 DIAGNOSIS — I10 ESSENTIAL HYPERTENSION: ICD-10-CM

## 2020-10-02 PROCEDURE — 3044F PR MOST RECENT HEMOGLOBIN A1C LEVEL <7.0%: ICD-10-PCS | Mod: CPTII,S$GLB,, | Performed by: INTERNAL MEDICINE

## 2020-10-02 PROCEDURE — 3008F BODY MASS INDEX DOCD: CPT | Mod: CPTII,S$GLB,, | Performed by: INTERNAL MEDICINE

## 2020-10-02 PROCEDURE — 1101F PT FALLS ASSESS-DOCD LE1/YR: CPT | Mod: CPTII,S$GLB,, | Performed by: INTERNAL MEDICINE

## 2020-10-02 PROCEDURE — 3080F DIAST BP >= 90 MM HG: CPT | Mod: CPTII,S$GLB,, | Performed by: INTERNAL MEDICINE

## 2020-10-02 PROCEDURE — 1126F AMNT PAIN NOTED NONE PRSNT: CPT | Mod: S$GLB,,, | Performed by: INTERNAL MEDICINE

## 2020-10-02 PROCEDURE — 3008F PR BODY MASS INDEX (BMI) DOCUMENTED: ICD-10-PCS | Mod: CPTII,S$GLB,, | Performed by: INTERNAL MEDICINE

## 2020-10-02 PROCEDURE — 3077F PR MOST RECENT SYSTOLIC BLOOD PRESSURE >= 140 MM HG: ICD-10-PCS | Mod: CPTII,S$GLB,, | Performed by: INTERNAL MEDICINE

## 2020-10-02 PROCEDURE — 99999 PR PBB SHADOW E&M-EST. PATIENT-LVL III: ICD-10-PCS | Mod: PBBFAC,,, | Performed by: INTERNAL MEDICINE

## 2020-10-02 PROCEDURE — 99999 PR PBB SHADOW E&M-EST. PATIENT-LVL III: CPT | Mod: PBBFAC,,, | Performed by: INTERNAL MEDICINE

## 2020-10-02 PROCEDURE — 1159F MED LIST DOCD IN RCRD: CPT | Mod: S$GLB,,, | Performed by: INTERNAL MEDICINE

## 2020-10-02 PROCEDURE — 1159F PR MEDICATION LIST DOCUMENTED IN MEDICAL RECORD: ICD-10-PCS | Mod: S$GLB,,, | Performed by: INTERNAL MEDICINE

## 2020-10-02 PROCEDURE — 99214 PR OFFICE/OUTPT VISIT, EST, LEVL IV, 30-39 MIN: ICD-10-PCS | Mod: S$GLB,,, | Performed by: INTERNAL MEDICINE

## 2020-10-02 PROCEDURE — 3080F PR MOST RECENT DIASTOLIC BLOOD PRESSURE >= 90 MM HG: ICD-10-PCS | Mod: CPTII,S$GLB,, | Performed by: INTERNAL MEDICINE

## 2020-10-02 PROCEDURE — 1126F PR PAIN SEVERITY QUANTIFIED, NO PAIN PRESENT: ICD-10-PCS | Mod: S$GLB,,, | Performed by: INTERNAL MEDICINE

## 2020-10-02 PROCEDURE — 3044F HG A1C LEVEL LT 7.0%: CPT | Mod: CPTII,S$GLB,, | Performed by: INTERNAL MEDICINE

## 2020-10-02 PROCEDURE — 3077F SYST BP >= 140 MM HG: CPT | Mod: CPTII,S$GLB,, | Performed by: INTERNAL MEDICINE

## 2020-10-02 PROCEDURE — 99214 OFFICE O/P EST MOD 30 MIN: CPT | Mod: S$GLB,,, | Performed by: INTERNAL MEDICINE

## 2020-10-02 PROCEDURE — 1101F PR PT FALLS ASSESS DOC 0-1 FALLS W/OUT INJ PAST YR: ICD-10-PCS | Mod: CPTII,S$GLB,, | Performed by: INTERNAL MEDICINE

## 2020-10-02 RX ORDER — HYDROCODONE BITARTRATE AND ACETAMINOPHEN 5; 325 MG/1; MG/1
1 TABLET ORAL EVERY 8 HOURS PRN
Qty: 20 TABLET | Refills: 0 | Status: SHIPPED | OUTPATIENT
Start: 2020-10-02 | End: 2020-11-03 | Stop reason: SDUPTHER

## 2020-10-02 RX ORDER — SIMETHICONE 125 MG
125 CAPSULE ORAL 4 TIMES DAILY PRN
Qty: 60 CAPSULE | Refills: 2 | COMMUNITY
Start: 2020-10-02 | End: 2021-04-07 | Stop reason: CLARIF

## 2020-10-02 NOTE — PROGRESS NOTES
Subjective:       Patient ID: Jarrett Charlton Jr. is a 70 y.o. male.    Chief Complaint: Follow-up and infected finger    HPI  patient is here for follow-up visit from hospital and complained infection and a right middle finger tip from ingrown nail tender and erythema but no exudate.  Patient was admitted to the hospital last week for abdominal pain secondary to small-bowel obstruction had NG tube placement treated conservatively for 3 day able to eat and he colostomy show liquid stool he was sent home without any surgical intervention patient continue to have left-sided abdominal discomfort no nausea vomiting and cholesterol continue with yellow liquid stool patient of Javieryi it dehydration again from high ileostomy output  as in the past he has appointment with surgery next week  Review of Systems    Objective:      Physical Exam  Vitals signs and nursing note reviewed.   Constitutional:       General: He is not in acute distress.     Appearance: He is well-developed.   HENT:      Head: Normocephalic and atraumatic.      Right Ear: External ear normal.      Left Ear: External ear normal.      Nose: Nose normal.      Mouth/Throat:      Pharynx: No oropharyngeal exudate.   Eyes:      Extraocular Movements: Extraocular movements intact.      Conjunctiva/sclera: Conjunctivae normal.      Pupils: Pupils are equal, round, and reactive to light.   Neck:      Musculoskeletal: Normal range of motion and neck supple.      Thyroid: No thyromegaly.   Cardiovascular:      Rate and Rhythm: Normal rate and regular rhythm.      Heart sounds: Normal heart sounds. No murmur. No friction rub. No gallop.    Pulmonary:      Effort: Pulmonary effort is normal. No respiratory distress.      Breath sounds: Normal breath sounds. No wheezing or rales.   Chest:      Chest wall: No tenderness.   Abdominal:      General: Bowel sounds are normal. There is no distension.      Palpations: Abdomen is soft.      Tenderness: There is abdominal  tenderness (Mild diffuse tenderness on the left side of the abdomen with palpation positive bowel sounds and ileostomy with yellow liquid stool). There is no guarding or rebound.   Musculoskeletal: Normal range of motion.         General: No tenderness or deformity.   Lymphadenopathy:      Cervical: No cervical adenopathy.   Skin:     General: Skin is warm and dry.      Capillary Refill: Capillary refill takes less than 2 seconds.      Findings: No erythema or rash.   Neurological:      General: No focal deficit present.      Mental Status: He is alert and oriented to person, place, and time.   Psychiatric:         Thought Content: Thought content normal.         Judgment: Judgment normal.      Comments: Anxious aggrevated         Assessment:       1. Partial small bowel obstruction    2. Essential hypertension    3. Type 2 diabetes mellitus with stage 2 chronic kidney disease and hypertension        Plan:       Partial small bowel obstruction  Comments:  Will get KUB since patient has ileostomy output no complete obstruction will get KUB and treat his symptom medically follow-up with surgery next week  Orders:  -     simethicone (MYLICON) 125 mg Cap capsule; Take 1 capsule (125 mg total) by mouth 4 (four) times daily as needed for Flatulence (gas).  Dispense: 60 capsule; Refill: 2  -     HYDROcodone-acetaminophen (NORCO) 5-325 mg per tablet; Take 1 tablet by mouth every 8 (eight) hours as needed for Pain.  Dispense: 20 tablet; Refill: 0  -     X-Ray Abdomen Flat And Erect; Future; Expected date: 10/02/2020    Essential hypertension  Comments:  Patient bili from being agitated frustrated pt want surgery releive sbo will repeat BP check in 2 weeks    Type 2 diabetes mellitus with stage 2 chronic kidney disease and hypertension  Comments:  Well control with diet medication last hemoglobin A1c 6.4 continue with treatment and chronic kidney dizzy has improved GFR 55 cc/minute        Medication List with Changes/Refills    New Medications    HYDROCODONE-ACETAMINOPHEN (NORCO) 5-325 MG PER TABLET    Take 1 tablet by mouth every 8 (eight) hours as needed for Pain.    SIMETHICONE (MYLICON) 125 MG CAP CAPSULE    Take 1 capsule (125 mg total) by mouth 4 (four) times daily as needed for Flatulence (gas).   Current Medications    ACCU-CHEK PAUL CONTROL SOLN SOLN    1 drop by NOT APPLICABLE route once daily.    ACCU-CHEK SOFTCLIX LANCETS MISC    1 lancet by NOT APPLICABLE route once daily.    ALLOPURINOL (ZYLOPRIM) 300 MG TABLET    Take 1.5 tablets (450 mg total) by mouth once daily.    ALPRAZOLAM (XANAX) 0.5 MG TABLET    Take 1 tablet (0.5 mg total) by mouth 2 (two) times daily as needed (tinnitis).    AMITRIPTYLINE (ELAVIL) 25 MG TABLET    Take 2 tablets (50 mg total) by mouth every evening.    AMLODIPINE (NORVASC) 2.5 MG TABLET    Take 5 mg by mouth once daily.     ASPIRIN (ECOTRIN) 81 MG EC TABLET    Take 81 mg by mouth once daily.      BD ALCOHOL SWABS PAD        BLOOD SUGAR DIAGNOSTIC (ONE TOUCH ULTRA TEST) [facility]    USE ONE STRIP TO CHECK GLUCOSE EVERY DAY    BRIMONIDINE 0.2% (ALPHAGAN) 0.2 % DROP    INSTILL 1 DROP INTO EACH EYE TWICE DAILY    CARVEDILOL (COREG) 6.25 MG TABLET        CLONIDINE (CATAPRES) 0.1 MG TABLET    Take 0.5 mg by mouth once daily.    CLOPIDOGREL (PLAVIX) 75 MG TABLET    once daily.     DICYCLOMINE (BENTYL) 10 MG CAPSULE    Take 1 capsule (10 mg total) by mouth 4 (four) times daily before meals and nightly.    ERGOCALCIFEROL, VITAMIN D2, (VITAMIN D ORAL)    Take by mouth.    ESCITALOPRAM OXALATE (LEXAPRO) 10 MG TABLET    Take 10 mg by mouth once daily.    FLUOROURACIL (TOLAK) 4 % CREA    Apply 1 application topically once daily. To arms and tops of hands    GABAPENTIN (NEURONTIN) 600 MG TABLET    Take 1 tablet (600 mg total) by mouth 2 (two) times daily.    GLIMEPIRIDE (AMARYL) 4 MG TABLET    Take 1 tablet (4 mg total) by mouth daily with breakfast.    LINAGLIPTIN (TRADJENTA) 5 MG TAB TABLET    Take 1 tablet (5  mg total) by mouth once daily.    LOPERAMIDE (IMODIUM A-D) 2 MG TAB    Take 2 mg by mouth once daily.    LORAZEPAM (ATIVAN) 0.5 MG TABLET    Take 0.5 mg by mouth every evening.    OMEPRAZOLE (PRILOSEC) 40 MG CAPSULE    Take 1 capsule (40 mg total) by mouth once daily.    ROSUVASTATIN (CRESTOR) 10 MG TABLET    Take 1 tablet (10 mg total) by mouth every evening.    ROSUVASTATIN (CRESTOR) 10 MG TABLET    Take 1 tablet (10 mg total) by mouth every evening.   Discontinued Medications    CLINDAMYCIN (CLEOCIN) 300 MG CAPSULE    Take 1 capsule (300 mg total) by mouth every 6 (six) hours.    HYDROCODONE-ACETAMINOPHEN (NORCO) 5-325 MG PER TABLET    Take 1 tablet by mouth every 12 (twelve) hours as needed.    HYDROCODONE-ACETAMINOPHEN (NORCO) 5-325 MG PER TABLET    Take 1 tablet by mouth every 6 (six) hours as needed for Pain.    PANTOPRAZOLE (PROTONIX) 40 MG TABLET    Take 40 mg by mouth once daily.

## 2020-10-04 RX ORDER — MUPIROCIN 20 MG/G
OINTMENT TOPICAL 2 TIMES DAILY
Qty: 22 G | Refills: 0 | Status: ON HOLD | OUTPATIENT
Start: 2020-10-04 | End: 2020-12-18

## 2020-10-04 RX ORDER — CEPHALEXIN 500 MG/1
500 TABLET ORAL 4 TIMES DAILY
Qty: 40 TABLET | Refills: 0 | Status: SHIPPED | OUTPATIENT
Start: 2020-10-04 | End: 2020-10-14

## 2020-10-05 ENCOUNTER — TELEPHONE (OUTPATIENT)
Dept: PRIMARY CARE CLINIC | Facility: CLINIC | Age: 70
End: 2020-10-05

## 2020-10-05 ENCOUNTER — TELEPHONE (OUTPATIENT)
Dept: DERMATOLOGY | Facility: CLINIC | Age: 70
End: 2020-10-05

## 2020-10-05 NOTE — TELEPHONE ENCOUNTER
Spoke with regarding x-ray results. Pt. Notified x-ray WNL. Still not having regular bowel movements. Pt. States he is has not been eating much because of the abdominal pains he is feel on/off. Pt. States that the GI sent him a rx for Bentyl 10 mg qid and wanted to know if it was okay to take with his kidney function? Please advise.     Pt. Also asked if antibiotics were sent to the pharmacy because when he went on Friday the only thing they had ready for him to  was Norco for pain. Pt. Notified that Dr. Gonzalez sent him a rx for keflex 500 mg qid x 10 days. States he will eat something solid and if he does not have any improvement by Wednesday he will call to set up an appointment.

## 2020-10-05 NOTE — TELEPHONE ENCOUNTER
----- Message from Pretty Whitfield sent at 10/5/2020  1:13 PM CDT -----  Contact: self   Calling to get test results.  Name of test (lab, xray, etc.):   xray  Date of test:  10/2  Ordering provider: dr de la fuente  Where was the test performed:  [facility]  Would the patient rather a call back or a response via [facility]?:  call back   Comments:

## 2020-10-05 NOTE — TELEPHONE ENCOUNTER
----- Message from Alice Ayala sent at 10/5/2020 10:18 AM CDT -----  Regarding: Pt woke up sick and needs to cancle and reschedule his appt that is at 1130 today  Contact: [phone]  Please call pt to reschedule.

## 2020-10-06 ENCOUNTER — PATIENT OUTREACH (OUTPATIENT)
Dept: ADMINISTRATIVE | Facility: HOSPITAL | Age: 70
End: 2020-10-06

## 2020-10-09 ENCOUNTER — TELEPHONE (OUTPATIENT)
Dept: DERMATOLOGY | Facility: CLINIC | Age: 70
End: 2020-10-09

## 2020-10-09 NOTE — TELEPHONE ENCOUNTER
Called pt to confirm Mohs procedure for SCC L forearm for 10/13/2020 . Did Covid screening, negative. Okay to proceed with Mohs as planned.

## 2020-10-14 ENCOUNTER — TELEPHONE (OUTPATIENT)
Dept: PRIMARY CARE CLINIC | Facility: CLINIC | Age: 70
End: 2020-10-14

## 2020-10-14 DIAGNOSIS — N18.31 CHRONIC RENAL IMPAIRMENT, STAGE 3A: Primary | ICD-10-CM

## 2020-10-14 NOTE — TELEPHONE ENCOUNTER
Pt states his b/p dropped to 90/70 but back up to 110/80.  He is having pain in his back and wants to know if you can put an order in to have kidneys tested?

## 2020-10-14 NOTE — TELEPHONE ENCOUNTER
----- Message from Radha Peterson sent at 10/14/2020  1:17 PM CDT -----  Contact: Patient [phone]  Patient is experiencing lower back pain and believes it may be his kidneys.    Please call and advise.    Thank you

## 2020-10-15 ENCOUNTER — TELEPHONE (OUTPATIENT)
Dept: PHARMACY | Facility: CLINIC | Age: 70
End: 2020-10-15

## 2020-10-16 ENCOUNTER — TELEPHONE (OUTPATIENT)
Dept: PRIMARY CARE CLINIC | Facility: CLINIC | Age: 70
End: 2020-10-16

## 2020-10-16 NOTE — TELEPHONE ENCOUNTER
----- Message from Radha Peterson sent at 10/16/2020 12:07 PM CDT -----  Contact: Patient [phone]  Patient stated that they missed a call from Kelly.    Please call and advise.    Thank You

## 2020-10-16 NOTE — TELEPHONE ENCOUNTER
Phoned pt states still having problems with his bowel and is going to get a second opinion with another GI to see what can be done. Pt states he is very angry that it takes two to three weeks to get an appt to see Dr Gonzalez and then hung up phone.

## 2020-10-16 NOTE — TELEPHONE ENCOUNTER
----- Message from Deepika Cornejo sent at 10/16/2020  1:56 PM CDT -----  Contact: [phone]  Pt would like call back from nurse, message was sent earlier he states he has not received call back yet

## 2020-10-19 ENCOUNTER — OFFICE VISIT (OUTPATIENT)
Dept: SURGERY | Facility: CLINIC | Age: 70
End: 2020-10-19
Attending: COLON & RECTAL SURGERY
Payer: MEDICARE

## 2020-10-19 VITALS
DIASTOLIC BLOOD PRESSURE: 92 MMHG | WEIGHT: 221.56 LBS | HEIGHT: 74 IN | HEART RATE: 76 BPM | BODY MASS INDEX: 28.43 KG/M2 | SYSTOLIC BLOOD PRESSURE: 123 MMHG

## 2020-10-19 DIAGNOSIS — R10.32 LEFT LOWER QUADRANT ABDOMINAL PAIN: Primary | ICD-10-CM

## 2020-10-19 PROCEDURE — 3008F PR BODY MASS INDEX (BMI) DOCUMENTED: ICD-10-PCS | Mod: CPTII,S$GLB,, | Performed by: COLON & RECTAL SURGERY

## 2020-10-19 PROCEDURE — 1100F PR PT FALLS ASSESS DOC 2+ FALLS/FALL W/INJURY/YR: ICD-10-PCS | Mod: CPTII,S$GLB,, | Performed by: COLON & RECTAL SURGERY

## 2020-10-19 PROCEDURE — 1100F PTFALLS ASSESS-DOCD GE2>/YR: CPT | Mod: CPTII,S$GLB,, | Performed by: COLON & RECTAL SURGERY

## 2020-10-19 PROCEDURE — 1125F PR PAIN SEVERITY QUANTIFIED, PAIN PRESENT: ICD-10-PCS | Mod: S$GLB,,, | Performed by: COLON & RECTAL SURGERY

## 2020-10-19 PROCEDURE — 1125F AMNT PAIN NOTED PAIN PRSNT: CPT | Mod: S$GLB,,, | Performed by: COLON & RECTAL SURGERY

## 2020-10-19 PROCEDURE — 3080F PR MOST RECENT DIASTOLIC BLOOD PRESSURE >= 90 MM HG: ICD-10-PCS | Mod: CPTII,S$GLB,, | Performed by: COLON & RECTAL SURGERY

## 2020-10-19 PROCEDURE — 99213 OFFICE O/P EST LOW 20 MIN: CPT | Mod: S$GLB,,, | Performed by: COLON & RECTAL SURGERY

## 2020-10-19 PROCEDURE — 99999 PR PBB SHADOW E&M-EST. PATIENT-LVL III: ICD-10-PCS | Mod: PBBFAC,,, | Performed by: COLON & RECTAL SURGERY

## 2020-10-19 PROCEDURE — 3288F FALL RISK ASSESSMENT DOCD: CPT | Mod: CPTII,S$GLB,, | Performed by: COLON & RECTAL SURGERY

## 2020-10-19 PROCEDURE — 3288F PR FALLS RISK ASSESSMENT DOCUMENTED: ICD-10-PCS | Mod: CPTII,S$GLB,, | Performed by: COLON & RECTAL SURGERY

## 2020-10-19 PROCEDURE — 3008F BODY MASS INDEX DOCD: CPT | Mod: CPTII,S$GLB,, | Performed by: COLON & RECTAL SURGERY

## 2020-10-19 PROCEDURE — 3074F SYST BP LT 130 MM HG: CPT | Mod: CPTII,S$GLB,, | Performed by: COLON & RECTAL SURGERY

## 2020-10-19 PROCEDURE — 3080F DIAST BP >= 90 MM HG: CPT | Mod: CPTII,S$GLB,, | Performed by: COLON & RECTAL SURGERY

## 2020-10-19 PROCEDURE — 99213 PR OFFICE/OUTPT VISIT, EST, LEVL III, 20-29 MIN: ICD-10-PCS | Mod: S$GLB,,, | Performed by: COLON & RECTAL SURGERY

## 2020-10-19 PROCEDURE — 1159F MED LIST DOCD IN RCRD: CPT | Mod: S$GLB,,, | Performed by: COLON & RECTAL SURGERY

## 2020-10-19 PROCEDURE — 1159F PR MEDICATION LIST DOCUMENTED IN MEDICAL RECORD: ICD-10-PCS | Mod: S$GLB,,, | Performed by: COLON & RECTAL SURGERY

## 2020-10-19 PROCEDURE — 99999 PR PBB SHADOW E&M-EST. PATIENT-LVL III: CPT | Mod: PBBFAC,,, | Performed by: COLON & RECTAL SURGERY

## 2020-10-19 PROCEDURE — 3074F PR MOST RECENT SYSTOLIC BLOOD PRESSURE < 130 MM HG: ICD-10-PCS | Mod: CPTII,S$GLB,, | Performed by: COLON & RECTAL SURGERY

## 2020-10-19 RX ORDER — DICYCLOMINE HYDROCHLORIDE 10 MG/1
10 CAPSULE ORAL
Qty: 120 CAPSULE | Refills: 2 | Status: ON HOLD | OUTPATIENT
Start: 2020-10-19 | End: 2020-11-30 | Stop reason: HOSPADM

## 2020-10-19 NOTE — PROGRESS NOTES
CRS Office Visit History and Physical      SUBJECTIVE:     Chief Complaint: Small intestine blockage, 2nd opinion    History of Present Illness:  The patient is new patient to this practice.   Course is as follows:  Patient is a 70 y.o. male presents for 2nd opinion regarding left lower quadrant abdominal pain.  He is well known to this practice.    He has a history of an open proctocolectomy performed in 1985 for ulcerative colitis.  Per his report his ileostomy was initially placed on the left side, but sounds like it was somewhat flushed with his skin and then was moved to the right side of his abdomen later that year.  He insists he has not had any surgery since 1985 in his abdomen.  States that he has had multiple episodes of admissions and trips to the emergency room for bowel obstructions.  Estimates he goes to [facility] emergency room 2-3 times per year and requests nasogastric tube placement.  Generally symptoms resolve with this management.    He was admitted on September 27th most recently to our service as a transfer from [facility] for evidence of partial small-bowel obstruction the left lower quadrant, likely related to adhesive disease.  Again improved with nasogastric decompression and was discharged home 2 days after admission.  He states that he thinks his symptoms have been worse overall in the last year.  He has now developed what feels like a more constant left lower quadrant abdominal pain.  This is generally a 3/10, but gets worse after eating.  He limits his diet to avoid knots, raw fruits, raw vegetables, and reasons.  Denies any weight loss over the last year, in fact has experienced a 20 lb weight gain which he attributes to changes in his diabetes medications.  He did start Bentyl, 4 times per day, after discharge from the hospital most recently.  He does not think that this has had any impact on his symptoms.  Has also had 1 admission last summer for bowel obstruction related to  incarcerated peristomal hernia.  This hernia was reduced at the bedside.  He states this is the only episode like this that he has had in the past.  He did see Dr. Haley to discuss possible hernia repair, but they elected to continue with conservative management.      Review of patient's allergies indicates:  No Known Allergies    Past Medical History:   Diagnosis Date    Basal cell carcinoma     BPH (benign prostatic hyperplasia)     s/p TURP    Carotid stenosis     Chronic kidney disease     Claudication     Coronary artery disease     DDD (degenerative disc disease) 10/21/2013    Diabetes mellitus with renal complications     Disc disease, degenerative, cervical     Encounter for blood transfusion     GERD (gastroesophageal reflux disease)     Gout, chronic     History of ulcerative colitis     s/p colectomy and ileostomy    HLD (hyperlipidemia)     HTN (hypertension)     Ileostomy in place 1982    RBBB     Squamous cell carcinoma 03/08/2018    Left superior helix near insertion    Squamous cell carcinoma 04/12/2018    Left forearm x 5    Ventricular tachycardia      Past Surgical History:   Procedure Laterality Date    cardiac stents      CATARACT EXTRACTION Bilateral     CERVICAL FUSION      colectomy and ileostomy  1985    TRANSURETHRAL RESECTION OF PROSTATE (TURP) WITHOUT USE OF LASER N/A 1/23/2019    Procedure: TURP, WITHOUT USING LASER BIPOLAR;  Surgeon: Catarino Mota MD;  Location: [facility] OR [address] [place] [address];  Service: Urology;  Laterality: N/A;  1.5 HOURS     Family History   Problem Relation Age of Onset    Cancer Father     Diabetes Father     Dementia Mother     Melanoma Neg Hx     Hypertension Neg Hx     Arthritis Neg Hx      Social History     Tobacco Use    Smoking status: Never Smoker    Smokeless tobacco: Never Used   Substance Use Topics    Alcohol use: No    Drug use: No        Review of Systems:  Review of Systems   Constitutional: Negative for weight loss.  "  Gastrointestinal: Positive for abdominal pain and nausea. Negative for constipation and diarrhea.   All other systems reviewed and are negative.      OBJECTIVE:     Vital Signs (Most Recent)  BP (!) 123/92 (BP Location: Left arm, Patient Position: Sitting, BP Method: Large (Automatic))   Pulse 76   Ht 6' 2" (1.88 m)   Wt 100.5 kg (221 lb 9 oz)   BMI 28.45 kg/m²     Physical Exam:  General: White male in no distress   Neuro: Alert and oriented x 4.  Moves all extremities.     HEENT: No icterus.  Trachea midline  Respiratory: Respirations are even and unlabored  Cardiac: Regular rate  Abdomen:  Soft, nondistended, nontender, no palpable masses.  Healthy appearing ileostomy in the right lower quadrant with small amount of liquid stool in the bag.  There is a well-healed midline incision.  There is a small transverse left lower quadrant incision, consistent with prior ostomy site.  There is also a very lateral left sided paramedian incision.  He does not recall why this incision was created, but states it must have been from his surgeries in 1985.  Extremities: Warm dry and intact  Skin: No rashes    Imaging:   CT abd/pel (9/25/2020)  Postsurgical bowel changes with right lower ostomy noting nonobstructed fat and bowel containing parastomal hernia.  Fluid distended loops of proximal bowel in the left abdomen with nearby small bowel feces sign could relate to component of oral contrast bolus with delayed transit.  Additional consideration to include sequela of early partial small bowel obstruction with possible area of transition in the left upper abdomen.    CT abd/pel (7/19/2019)  1. Parastomal hernia in the right lower quadrant colostomy showing small-bowel obstruction/strangulation with dilatation proximally to the loop and small bowel fecalization.    CT abd/pel (1/6/2019)  1. Trace atelectasis or scarring noted in the right lung base.  2. Right lower quadrant ileostomy with small bowel containing paresis " stoma hernia, non obstructed or inflamed.  3. Distended small bowel in the left mid abdomen up to 3 cm is suspicious for low grade partial small bowel obstruction which could represent ileus/enteritis.    ASSESSMENT/PLAN:     70-year-old male with history of ulcerative colitis, status post open total proctocolectomy in 1985 with reciting of ileostomy that same year, presenting for 2nd opinion regarding left lower quadrant crampy abdominal pain and concern for recurrent partial small-bowel obstructions.  We discussed that surgery for adhesive disease can be challenging, as we are likely to create more adhesions during the process of surgery, and that there is no guarantee that symptoms will be better after surgery than they are currently.  I would like to obtain a small-bowel follow-through to better evaluate the bowel in the left lower quadrant and evaluate for any tight narrowing.  Also refilled his Bentyl today.  He is happy with Dr. Haley's care, and would be interested in seeing him back for repair of peristomal hernia and adhesiolysis if there is evidence of tight narrowing on his small-bowel follow-through.    Ivette Dobbins MD  Staff Surgeon  Colon & Rectal Surgery

## 2020-10-19 NOTE — Clinical Note
Hey-  This karoline came to see me today for a 2nd opinion.  It sounds like his partial obstructive symptoms have been worse over the last year.  I recommended that we get a small-bowel follow-through, and told him that if it looks like surgery would be beneficial I would encourage him to follow up with you since you guys no each other well.  He was happy with that plan and likes you.    Catrina

## 2020-10-19 NOTE — PROGRESS NOTES
67 y.o. male patient is here for suture removal following Mohs' surgery.    Patient reports no problems.    WOUND PE:  The left proximal forearm and right distal forearm sutures intact. Wound healing well. Good skin edges. No signs or symptoms of infection.    IMPRESSION:  Healing operative site from Mohs' surgery SCC, left proximal forearm and right distal forearm s/p mohs with CLC, postop day # 29 and 15.    PLAN:  Sutures removed today. Steri-strips applied.  Continue wound care.  Keep moist with Aquaphor.    RTC:  In 3-6 months with Florencia Koch M.D. for skin check or sooner if new concern arises.   800

## 2020-10-20 ENCOUNTER — PROCEDURE VISIT (OUTPATIENT)
Dept: DERMATOLOGY | Facility: CLINIC | Age: 70
End: 2020-10-20
Payer: MEDICARE

## 2020-10-20 ENCOUNTER — TELEPHONE (OUTPATIENT)
Dept: SURGERY | Facility: CLINIC | Age: 70
End: 2020-10-20

## 2020-10-20 VITALS
DIASTOLIC BLOOD PRESSURE: 81 MMHG | SYSTOLIC BLOOD PRESSURE: 132 MMHG | HEART RATE: 86 BPM | BODY MASS INDEX: 28.36 KG/M2 | HEIGHT: 74 IN | WEIGHT: 221 LBS

## 2020-10-20 DIAGNOSIS — C44.629 SQUAMOUS CELL CARCINOMA OF LEFT UPPER EXTREMITY: Primary | ICD-10-CM

## 2020-10-20 PROCEDURE — 99499 UNLISTED E&M SERVICE: CPT | Mod: S$GLB,,, | Performed by: DERMATOLOGY

## 2020-10-20 PROCEDURE — 99499 NO LOS: ICD-10-PCS | Mod: S$GLB,,, | Performed by: DERMATOLOGY

## 2020-10-20 PROCEDURE — 13120 CMPLX RPR S/A/L 1.1-2.5 CM: CPT | Mod: 51,S$GLB,, | Performed by: DERMATOLOGY

## 2020-10-20 PROCEDURE — 13120 PR RECMPL WND SCALP,EXTR 1.1-2.5 CM: ICD-10-PCS | Mod: 51,S$GLB,, | Performed by: DERMATOLOGY

## 2020-10-20 PROCEDURE — 17313 MOHS 1 STAGE T/A/L: CPT | Mod: S$GLB,,, | Performed by: DERMATOLOGY

## 2020-10-20 PROCEDURE — 17313: ICD-10-PCS | Mod: S$GLB,,, | Performed by: DERMATOLOGY

## 2020-10-20 NOTE — PROGRESS NOTES
PROCEDURE: Mohs' Micrographic Surgery    INDICATION: Biopsy-proven skin cancer of cosmetically and functionally important areas, including head, neck, genital, hand, foot, or areas known for having difficulty in healing, such as the lower anterior legs. Tumor with ill-defined borders.    REFERRING MD: Florencia Koch M.D.    CASE NUMBER:     ANESTHETIC: 3 cc 0.5% Lidocaine with Epi 1:200,000 mixed 1:1 with 0.5% Bupivacaine    SURGICAL PREP: Hibiclens    SURGEON: Maurice Kimball MD    ASSISTANTS: Ibis Rodriguez PA-C and Julisa Calderón MA    PREOPERATIVE DIAGNOSIS: squamous cell carcinoma    POSTOPERATIVE DIAGNOSIS: squamous cell carcinoma    PATHOLOGIC DIAGNOSIS: squamous cell carcinoma- invasive, well differentiated    HISTOLOGY OF SPECIMENS IN FIRST STAGE:   Tumor Type: No tumor seen.    STAGES OF MOHS' SURGERY PERFORMED: 1    TUMOR-FREE PLANE ACHIEVED: Yes    HEMOSTASIS: electrocoagulation     SPECIMENS: 2    LOCATION: left forearm. Location verified with Dr. Koch's clinical photograph as well my measurements and photograph from prior visit. Patient also verified location.    INITIAL LESION SIZE: 0.6 x 0.7 cm    FINAL DEFECT SIZE: 0.8 x 1.0 cm    WOUND REPAIR/DISPOSITION: The patient tolerated Mohs' Micrographic Surgery for a squamous cell carcinoma very well. When the tumor was completely removed, a repair of the surgical defect was undertaken.       PROCEDURE: Complex Linear Repair    INDICATION: Status post Mohs' Micrographic Surgery for squamous cell carcinoma.    CASE NUMBER:     SURGEON: Maurice Kimball MD    ASSISTANTS: Ibis Rodriguez PA-C and Julisa Calderón MA    ANESTHETIC: 2 cc 1% Lidocaine with Epinephrine 1:100,000    SURGICAL PREP: Hibiclens, prepped by Julisa Calderón MA    LOCATION: left forearm    DEFECT SIZE: 0.8 x 1.0 cm    WOUND REPAIR/DISPOSITION:  After the patient's carcinoma had been completely removed with Mohs' Micrographic Surgery, a repair of the surgical defect was  "undertaken. The patient was returned to the operating suite where the area of left forearm was prepped, draped, and anesthetized in the usual sterile fashion. The wound was widely undermined in all directions. Then, electrocoagulation was used to obtain meticulous hemostasis. 4-0 Vicryl buried vertical mattress sutures were placed into the subcutaneous and dermal plane to close the wound and danial the cutaneous wound edge. Bilateral dog ears were identified and were removed by a standard Burow's triangle technique. The cutaneous wound edges were closed using interrupted 4-0 Prolene suture.    The patient tolerated the procedure well.    The area was cleaned and dressed appropriately and the patient was given wound care instructions. Patient will have his sutures removed in 14 days at his PCP office. Advised patient to contact our office with any questions or concerns regarding this area. Patient verbally stated understanding.    LENGTH OF REPAIR: 2 cm    Vitals:    10/20/20 1033   BP: 132/81   BP Location: Left arm   Patient Position: Sitting   BP Method: Small (Automatic)   Pulse: 86   Weight: 100.2 kg (221 lb)   Height: 6' 2" (1.88 m)       "

## 2020-10-20 NOTE — TELEPHONE ENCOUNTER
Spoke with patient. Confirmed date,time, location -FU appointment next week 10/27/2020 10:40 am [facility].

## 2020-10-21 ENCOUNTER — TELEPHONE (OUTPATIENT)
Dept: SURGERY | Facility: CLINIC | Age: 70
End: 2020-10-21

## 2020-10-21 ENCOUNTER — TELEPHONE (OUTPATIENT)
Dept: PRIMARY CARE CLINIC | Facility: CLINIC | Age: 70
End: 2020-10-21

## 2020-10-21 NOTE — TELEPHONE ENCOUNTER
----- Message from Radha Peterson sent at 10/21/2020 12:09 PM CDT -----  Contact: Patient [phone]  Test Results Request:    Test Performed: US    When & Where: 10/19/2020 [facility]    Please call and advise.    Thank You

## 2020-10-22 ENCOUNTER — PROCEDURE VISIT (OUTPATIENT)
Dept: DIABETES | Facility: CLINIC | Age: 70
End: 2020-10-22
Payer: MEDICARE

## 2020-10-22 VITALS — HEIGHT: 74 IN | BODY MASS INDEX: 28.35 KG/M2 | WEIGHT: 220.88 LBS

## 2020-10-22 DIAGNOSIS — N18.30 TYPE 2 DIABETES MELLITUS WITH STAGE 3 CHRONIC KIDNEY DISEASE, WITHOUT LONG-TERM CURRENT USE OF INSULIN: ICD-10-CM

## 2020-10-22 DIAGNOSIS — E11.22 TYPE 2 DIABETES MELLITUS WITH STAGE 2 CHRONIC KIDNEY DISEASE, WITHOUT LONG-TERM CURRENT USE OF INSULIN: Primary | ICD-10-CM

## 2020-10-22 DIAGNOSIS — N18.2 TYPE 2 DIABETES MELLITUS WITH STAGE 2 CHRONIC KIDNEY DISEASE, WITHOUT LONG-TERM CURRENT USE OF INSULIN: Primary | ICD-10-CM

## 2020-10-22 DIAGNOSIS — E11.42 TYPE 2 DIABETES MELLITUS WITH DIABETIC POLYNEUROPATHY, WITHOUT LONG-TERM CURRENT USE OF INSULIN: ICD-10-CM

## 2020-10-22 DIAGNOSIS — E11.22 TYPE 2 DIABETES MELLITUS WITH STAGE 3 CHRONIC KIDNEY DISEASE, WITHOUT LONG-TERM CURRENT USE OF INSULIN: ICD-10-CM

## 2020-10-22 DIAGNOSIS — E11.9 TYPE 2 DIABETES MELLITUS WITHOUT COMPLICATION, WITHOUT LONG-TERM CURRENT USE OF INSULIN: ICD-10-CM

## 2020-10-22 PROCEDURE — 95250 PR GLUCOSE MONITORING,72 HRS,SUB-Q SENSOR: ICD-10-PCS | Mod: S$GLB,,, | Performed by: DIETITIAN, REGISTERED

## 2020-10-22 PROCEDURE — 95250 CONT GLUC MNTR PHYS/QHP EQP: CPT | Mod: S$GLB,,, | Performed by: DIETITIAN, REGISTERED

## 2020-10-22 NOTE — PROGRESS NOTES
CONTINUOUS GLUCOSE MONITOR 72 HOUR START    Patient is here in clinic today for initial start of professional continuous glucose monitoring system (CGMA). Reviewed with patient when to return to download data. Provider inserted sensor on left arm. Sensor was setup. Discussed what the sensor will record and who will interpret the data. Patient aware that she still needs to monitor blood glucose qac and hs. Also discussed the daily log that patient is required to complete while CGM is being used. Patient provided with daily log. Questions addressed. Initialization finished. No futher questions. Patient states understanding.    Lot number: [id number]  Serial number: [id number]  Expiration Date: 02/28/2021    Time spent counseling patient: 30 minutes MONITOR START  Visit Time Spent:  30 minutes

## 2020-10-23 ENCOUNTER — TELEPHONE (OUTPATIENT)
Dept: RADIOLOGY | Facility: HOSPITAL | Age: 70
End: 2020-10-23

## 2020-10-26 ENCOUNTER — HOSPITAL ENCOUNTER (OUTPATIENT)
Dept: RADIOLOGY | Facility: HOSPITAL | Age: 70
Discharge: HOME | End: 2020-10-26
Attending: COLON & RECTAL SURGERY
Payer: MEDICARE

## 2020-10-26 DIAGNOSIS — R10.32 LEFT LOWER QUADRANT ABDOMINAL PAIN: ICD-10-CM

## 2020-10-26 PROCEDURE — 74250 X-RAY XM SM INT 1CNTRST STD: CPT | Mod: TC,FY

## 2020-10-26 PROCEDURE — 74250 FL SMALL BOWEL FOLLOW THROUGH: ICD-10-PCS | Mod: 26,,, | Performed by: RADIOLOGY

## 2020-10-26 PROCEDURE — 74250 X-RAY XM SM INT 1CNTRST STD: CPT | Mod: 26,,, | Performed by: RADIOLOGY

## 2020-10-26 PROCEDURE — 25500020 PHARM REV CODE 255: Performed by: COLON & RECTAL SURGERY

## 2020-10-26 PROCEDURE — A9698 NON-RAD CONTRAST MATERIALNOC: HCPCS | Performed by: COLON & RECTAL SURGERY

## 2020-10-26 RX ADMIN — BARIUM SULFATE 590 ML: 0.6 SUSPENSION ORAL at 08:10

## 2020-10-26 NOTE — PROGRESS NOTES
"  CRS Office Visit Followup    Referring Md:   No referring provider defined for this encounter.    SUBJECTIVE:     Chief Complaint: ileostomy problems    History of Present Illness:  The patient is an established patient to this practice.   Course is as follows:  Patient is a 70 y.o. male presents with ileostomy dysfunction  History of ulcerative colitis s/p total proctocolectomy with end ileostomy (1985)  He has required revisions of the ileostomy and ultimately it was transposed to the right side due to parastomal and incisional hernias.  He has had intermittent problems with syncope due to dehydration from the high output ostomy and takes imodium and Pedialyte as needed as well as daily Citrucel.  History also includes diabetes (on metformin) gout, CKD II and HTN.  He had a TURP in 1/2019 and is under surveillance with regular PSAs.    1/29/19: He has been doing well for multiple years.  Over the past 6-8 years, he has had intermittent episodes of dehydration and syncope.  This is 2-3 times per year.  He empties his bag 6-7 times per day.  Changes is pouch every 3-4 days.  No issues with his perineal incision. Overall feels well and is active.  Syncopal episodes require ED transfer and frequent hospital admission.    - started on imodium    2/26/19: Has taken imodium intermittently when stoma output is high but has not taken it scheduled. Thinks the imodium cuts his output about in half but has not taken exact measurements. Still empties his bag 6-8 times a day, he estimates about 500cc per empty. Had an episode of dehydration/near syncope earlier in the month. Also notes intermittent episodes of being "stopped up" associated with RLQ pain lasting up to 24h. He treats this by drinking large volumes of Pedialyte which eventually return of stoma output. Currently feels well, no lightheadedness or dizziness at this time.    8/2/19:  Admitted for bowel obstruction secondary to incarcerated peristomal hernia on " 07/19/2019.  This was reduced in the ER with return of bowel function.   In regards to his diarrhea, he overall feels substantially improved by taking fiber and having decreased output.  Regarding his parastomal hernia, he has never had complaints or recollection of a incarcerated peristomal in the past.  He has had intermittent bowel obstructions they have all been secondary to adhesive disease.  He denies any pain around the stoma.  He is very concerned that revision of the ostomy may result in worse adhesion of the pouch.  He has previously had a left-sided ileostomy that was flat with the scan and did not pouch well.    9/22/2020:  Presented for left lower quadrant abdominal pain.  He complains of abdominal pain and bloating after eating.  No issues with this stoma.  Taking Imodium every other day.  No further dehydration in syncope.  Kidney numbers have improved.  Is taking probiotics.    - admitted 09/20 5-927 for small-bowel obstruction managed conservatively.    10/27/2020:  Presents for repeat evaluation for intermittent abdominal bloating and pain.  States he is unable to eat large meals secondary to bloating and distention associated with pain.  Has food fear.  This is impacting his daily quality of life.  Functionally, he continues to be active.  No exertional angina or dyspnea on exertion.  He takes Plavix and aspirin.  He is pleased with his ileostomy placement.    Review of Systems:  Review of Systems   Constitutional: Negative for chills, diaphoresis, fever, malaise/fatigue and weight loss.   HENT: Negative for congestion.    Respiratory: Negative for shortness of breath.    Cardiovascular: Negative for chest pain and leg swelling.   Gastrointestinal: Positive for abdominal pain and diarrhea. Negative for blood in stool, constipation, nausea and vomiting.   Genitourinary: Negative for dysuria.   Musculoskeletal: Negative for back pain and myalgias.   Skin: Negative for rash.   Neurological: Negative  "for dizziness, sensory change and weakness.   Endo/Heme/Allergies: Does not bruise/bleed easily.   Psychiatric/Behavioral: Negative for depression.       OBJECTIVE:     Vital Signs (Most Recent)  BP (!) 160/90 (BP Location: Right arm, Patient Position: Sitting, BP Method: Large (Manual))   Ht 6' 2" (1.88 m)   Wt 98.9 kg (218 lb 1.6 oz)   BMI 28.00 kg/m²     Physical Exam:  General: White male in no distress   Neuro: alert and oriented x 4.  Moves all extremities.     HEENT: no icterus.  Trachea midline  Respiratory: respirations are even and unlabored  Cardiac: regular rate  Abdomen:  Midline laparotomy is well-healed.  Prior ileostomy site in the left upper quadrant is also well healed.  No hernia on the left upper quadrant. Ileostomy currently sits in the right upper quadrant is pink protrudes above the level of the skin.  Moderate peristomal hernia  Extremities: Warm dry and intact  Skin: no rashes  Anorectal:  Deferred    Labs: H/H = 12/38.  Cr = 1.4.  Albumin of 4.5    Imaging:    CT from 07/19/2019 personally reviewed and demonstrates parastomal hernia in the right lower quadrant colostomy showing small-bowel obstruction/strangulation with dilatation proximally to the loop and small bowel fecalization.    CT 09/25/2020:    - Postsurgical bowel changes with right lower ostomy noting nonobstructed fat and bowel containing parastomal hernia.    - Fluid distended loops of proximal bowel in the left abdomen with nearby small bowel feces sign could relate to component of oral contrast bolus with delayed transit.  Additional consideration to include sequela of early partial small bowel obstruction with possible area of transition in the left upper abdomen.     Small-bowel follow-through 10/26/2020: Small duodenal diverticulum.  Otherwise, normal small bowel follow through evaluation without any evidence of delayed small bowel transit, obstruction, or stricture.    ASSESSMENT/PLAN:     Jarrett was seen today for " pre-op exam.    Diagnoses and all orders for this visit:    Intestinal adhesions with partial obstruction    Parastomal hernia with obstruction and without gangrene        70-year-old gentleman with ulcerative colitis status post total procto colectomy and end ileostomy in 1985.  Since procedure, he has had issues with intermittent dehydration resulting in syncope secondary to high output.  This has been followed by intermittent small-bowel obstruction requiring hospitalization x 3.  He now has developed food fear secondary to chronic partial small-bowel obstructions.  Additionally, he has a moderate peristomal hernia that was complicated by 1 incarcerated event that spontaneously resolved.  We discussed today that he has 2 options.  First option is continued observation.  We discussed this likely would result ongoing partial small bowel obstruction secondary to prior scar tissue.  Sec option would be for redo exploratory laparotomy with extensive lysis of adhesions and revision of his stoma with celine Spencer parastomal hernia repair.  This could be done with a phase ex mesh so that no permanent mesh was implanted.  We discussed the risk of recurrence of adhesive small bowel obstruction, damage to surrounding structures, the need for small bowel resection, worsening of his dehydration, need to revise is ileostomy, difficulty with pouching.  Given the severity of his symptoms, we believe that the chance of benefit from his operation outweighs the potential chance of risk.  Therefore, he wishes to proceed with redo exploratory laparotomy, extensive lysis of adhesions, and parastomal hernia repair.  He will need to stop his Plavix 5 days prior.  Multiple dates were given to him today.  He will let us know which date works for him.  Will plan to keep his ostomy in the same location.  He wants to ensure that his ostomy buds to allow ease of pouching.      CED Haley MD  Staff Surgeon  Colon & Rectal  Surgery

## 2020-10-27 ENCOUNTER — OFFICE VISIT (OUTPATIENT)
Dept: SURGERY | Facility: CLINIC | Age: 70
End: 2020-10-27
Payer: MEDICARE

## 2020-10-27 VITALS
SYSTOLIC BLOOD PRESSURE: 160 MMHG | BODY MASS INDEX: 27.99 KG/M2 | HEIGHT: 74 IN | DIASTOLIC BLOOD PRESSURE: 90 MMHG | WEIGHT: 218.13 LBS

## 2020-10-27 DIAGNOSIS — K43.3 PARASTOMAL HERNIA WITH OBSTRUCTION AND WITHOUT GANGRENE: ICD-10-CM

## 2020-10-27 DIAGNOSIS — K56.51 INTESTINAL ADHESIONS WITH PARTIAL OBSTRUCTION: Primary | ICD-10-CM

## 2020-10-27 DIAGNOSIS — Z01.818 PREOP TESTING: ICD-10-CM

## 2020-10-27 PROCEDURE — 3008F PR BODY MASS INDEX (BMI) DOCUMENTED: ICD-10-PCS | Mod: CPTII,S$GLB,, | Performed by: COLON & RECTAL SURGERY

## 2020-10-27 PROCEDURE — 1125F AMNT PAIN NOTED PAIN PRSNT: CPT | Mod: S$GLB,,, | Performed by: COLON & RECTAL SURGERY

## 2020-10-27 PROCEDURE — 1101F PR PT FALLS ASSESS DOC 0-1 FALLS W/OUT INJ PAST YR: ICD-10-PCS | Mod: CPTII,S$GLB,, | Performed by: COLON & RECTAL SURGERY

## 2020-10-27 PROCEDURE — 3080F DIAST BP >= 90 MM HG: CPT | Mod: CPTII,S$GLB,, | Performed by: COLON & RECTAL SURGERY

## 2020-10-27 PROCEDURE — 99214 PR OFFICE/OUTPT VISIT, EST, LEVL IV, 30-39 MIN: ICD-10-PCS | Mod: S$GLB,,, | Performed by: COLON & RECTAL SURGERY

## 2020-10-27 PROCEDURE — 3077F SYST BP >= 140 MM HG: CPT | Mod: CPTII,S$GLB,, | Performed by: COLON & RECTAL SURGERY

## 2020-10-27 PROCEDURE — 3080F PR MOST RECENT DIASTOLIC BLOOD PRESSURE >= 90 MM HG: ICD-10-PCS | Mod: CPTII,S$GLB,, | Performed by: COLON & RECTAL SURGERY

## 2020-10-27 PROCEDURE — 99999 PR PBB SHADOW E&M-EST. PATIENT-LVL IV: CPT | Mod: PBBFAC,,, | Performed by: COLON & RECTAL SURGERY

## 2020-10-27 PROCEDURE — 99999 PR PBB SHADOW E&M-EST. PATIENT-LVL IV: ICD-10-PCS | Mod: PBBFAC,,, | Performed by: COLON & RECTAL SURGERY

## 2020-10-27 PROCEDURE — 99214 OFFICE O/P EST MOD 30 MIN: CPT | Mod: S$GLB,,, | Performed by: COLON & RECTAL SURGERY

## 2020-10-27 PROCEDURE — 3077F PR MOST RECENT SYSTOLIC BLOOD PRESSURE >= 140 MM HG: ICD-10-PCS | Mod: CPTII,S$GLB,, | Performed by: COLON & RECTAL SURGERY

## 2020-10-27 PROCEDURE — 1125F PR PAIN SEVERITY QUANTIFIED, PAIN PRESENT: ICD-10-PCS | Mod: S$GLB,,, | Performed by: COLON & RECTAL SURGERY

## 2020-10-27 PROCEDURE — 1101F PT FALLS ASSESS-DOCD LE1/YR: CPT | Mod: CPTII,S$GLB,, | Performed by: COLON & RECTAL SURGERY

## 2020-10-27 PROCEDURE — 1159F PR MEDICATION LIST DOCUMENTED IN MEDICAL RECORD: ICD-10-PCS | Mod: S$GLB,,, | Performed by: COLON & RECTAL SURGERY

## 2020-10-27 PROCEDURE — 1159F MED LIST DOCD IN RCRD: CPT | Mod: S$GLB,,, | Performed by: COLON & RECTAL SURGERY

## 2020-10-27 PROCEDURE — 3008F BODY MASS INDEX DOCD: CPT | Mod: CPTII,S$GLB,, | Performed by: COLON & RECTAL SURGERY

## 2020-10-28 ENCOUNTER — TELEPHONE (OUTPATIENT)
Dept: SURGERY | Facility: CLINIC | Age: 70
End: 2020-10-28

## 2020-10-28 DIAGNOSIS — K43.3 PARASTOMAL HERNIA WITH OBSTRUCTION AND WITHOUT GANGRENE: Primary | ICD-10-CM

## 2020-10-28 DIAGNOSIS — K56.51 INTESTINAL ADHESIONS WITH PARTIAL OBSTRUCTION: ICD-10-CM

## 2020-10-28 NOTE — TELEPHONE ENCOUNTER
----- Message from Kelly Jean sent at 10/27/2020  4:02 PM CDT -----  Jarrett Johnson calling regarding Appointment Access  (message) for a surgery date. Pt was told by Dr. doherty to call his nurse to schedule his surgery  [phone]

## 2020-10-28 NOTE — TELEPHONE ENCOUNTER
----- Message from Richelle Felipe sent at 10/28/2020  8:18 AM CDT -----  Regarding: Surgery date  Contact: [phone]  Calling to speak with nurse to schedule surgery on 11/9/2020 if possible. Please call to confirm

## 2020-10-28 NOTE — TELEPHONE ENCOUNTER
Spoke with patient. Confirmed message received. We will call patient back once new date confirmed.

## 2020-10-30 ENCOUNTER — TELEPHONE (OUTPATIENT)
Dept: SURGERY | Facility: CLINIC | Age: 70
End: 2020-10-30

## 2020-10-30 NOTE — TELEPHONE ENCOUNTER
Called pt and went over the pre-op instructions. Reminded him to stop Plavix 5 days before surgery and continue ASA. He does not have a bowel prep. Told him the orders for the Covid is in the systen and he can go to [facility] to have it done. Asked that he call me if he has any questions.

## 2020-11-02 ENCOUNTER — NUTRITION (OUTPATIENT)
Dept: DIABETES | Facility: CLINIC | Age: 70
End: 2020-11-02
Payer: MEDICARE

## 2020-11-02 VITALS — WEIGHT: 218.06 LBS | HEIGHT: 74 IN | BODY MASS INDEX: 27.98 KG/M2

## 2020-11-02 DIAGNOSIS — E11.9 TYPE 2 DIABETES MELLITUS WITHOUT COMPLICATION, WITHOUT LONG-TERM CURRENT USE OF INSULIN: ICD-10-CM

## 2020-11-02 DIAGNOSIS — N18.2 TYPE 2 DIABETES MELLITUS WITH STAGE 2 CHRONIC KIDNEY DISEASE, WITHOUT LONG-TERM CURRENT USE OF INSULIN: Primary | ICD-10-CM

## 2020-11-02 DIAGNOSIS — E11.22 TYPE 2 DIABETES MELLITUS WITH STAGE 2 CHRONIC KIDNEY DISEASE, WITHOUT LONG-TERM CURRENT USE OF INSULIN: Primary | ICD-10-CM

## 2020-11-02 PROCEDURE — G0108 DIAB MANAGE TRN  PER INDIV: HCPCS | Mod: S$GLB,,, | Performed by: DIETITIAN, REGISTERED

## 2020-11-02 PROCEDURE — 95251 PR GLUCOSE MONITOR, 72 HOUR, PHYS INTERP: ICD-10-PCS | Mod: S$GLB,,, | Performed by: NURSE PRACTITIONER

## 2020-11-02 PROCEDURE — G0108 PR DIAB MANAGE TRN  PER INDIV: ICD-10-PCS | Mod: S$GLB,,, | Performed by: DIETITIAN, REGISTERED

## 2020-11-02 PROCEDURE — 99999 PR PBB SHADOW E&M-EST. PATIENT-LVL IV: ICD-10-PCS | Mod: PBBFAC,,, | Performed by: DIETITIAN, REGISTERED

## 2020-11-02 PROCEDURE — 99999 PR PBB SHADOW E&M-EST. PATIENT-LVL IV: CPT | Mod: PBBFAC,,, | Performed by: DIETITIAN, REGISTERED

## 2020-11-02 PROCEDURE — 95251 CONT GLUC MNTR ANALYSIS I&R: CPT | Mod: S$GLB,,, | Performed by: NURSE PRACTITIONER

## 2020-11-02 NOTE — PROGRESS NOTES
Diabetes Education  Author: Melanie Zuñiga RD, CDE  Date: 11/2/2020    Diabetes Care Management Summary  Diabetes Education Record Assessment/Progress: Initial  Current Diabetes Risk Level: Low         Diabetes Type  Diabetes Type : Type II    Diabetes History  Diabetes Diagnosis: 5-10 years  Current Treatment: Oral Medication(tradjenta, glimepiride)  Reviewed Problem List with Patient: Yes    Health Maintenance was reviewed today with patient. Discussed with patient importance of routine eye exams, foot exams/foot care, blood work (i.e.: A1c, microalbumin, and lipid), dental visits, yearly flu vaccine, and pneumonia vaccine as indicated by PCP. Patient verbalized understanding.     Health Maintenance Topics with due status: Not Due       Topic Last Completion Date    Colorectal Cancer Screening 02/13/2019    Lipid Panel 04/20/2020    TETANUS VACCINE 07/28/2020    Eye Exam 09/24/2020    Hemoglobin A1c 09/26/2020    Foot Exam 09/30/2020     Health Maintenance Due   Topic Date Due    Shingles Vaccine (3 of 3) 11/01/2019       Nutrition  Meal Planning: water, 3 meals per day, diet drinks, artificial sweeteners, eats out often  What type of sweetener do you use?: Equal  What type of beverages do you drink?: water, diet soda/tea(coke zero, unsweet tea with equal, water)  Meal Plan 24 Hour Recall - Breakfast: eggs, grits, hash browns, or cereal, or oatmeal  Meal Plan 24 Hour Recall - Lunch: sandwich on whole wheat bread  Meal Plan 24 Hour Recall - Dinner: fast food    Monitoring   Monitoring: Accu-check May Smart View  Self Monitoring : testing at least once a day  Blood Glucose Logs: No  Do you use a personal continuous glucose monitor?: No  In the last month, how often have you had a low blood sugar reaction?: more than once a week  What are your symptoms of low blood sugar?: weak and shaky  How do you treat low blood sugar?: eat something  Can you tell when your blood sugar is too high?: sometimes  How do you treat  high blood sugar?: take medication and exercise              Exercise   Exercise Type: bike riding  Intensity: Moderate  Frequency: 3-5 Times per week  Duration: 30 min    Current Diabetes Treatment   Current Treatment: Oral Medication(tradjenta, glimepiride)    Social History  Preferred Learning Method: Face to Face  Primary Support: Self, Daughter, Family  Educational Level: High School  Occupation: retired  Smoking Status: Never a Smoker  Alcohol Use: Never            DDS-2 Score  ( > 3 = SIGNIFICANT DISTRESS): 1                   Barriers to Change  Barriers to Change: Financial(currently in the coverage GAP)  Learning Challenges : None    Readiness to Learn   Readiness to Learn : Acceptance    Cultural Influences  Cultural Influences: No    Diabetes Education Assessment/Progress  Diabetes Disease Process (diabetes disease process and treatment options): Individual Session, Written Materials Provided, Discussion, Needs Review  Nutrition (Incorporating nutritional management into one's lifestyle): Individual Session, Written Materials Provided, Discussion, Comprehends Key Points, Instructed  Physical Activity (incorporating physical activity into one's lifestyle): Individual Session, Written Materials Provided, Discussion, Comprehends Key Points  Medications (states correct name, dose, onset, peak, duration, side effects & timing of meds): Individual Session, Written Materials Provided, Discussion, Needs Review  Monitoring (monitoring blood glucose/other parameters & using results): Individual Session, Written Materials Provided, Discussion, Comprehends Key Points  Acute Complications (preventing, detecting, and treating acute complications): Individual Session, Written Materials Provided, Discussion, Comprehends Key Points  Chronic Complications (preventing, detecting, and treating chronic complications): Individual Session, Written Materials Provided, Discussion, Needs Review  Clinical (diabetes, other  pertinent medical history, and relevant comorbidities reviewed during visit): Individual Session, Written Materials Provided, Discussion, Needs Review  Cognitive (knowledge of self-management skills, functional health literacy): Individual Session, Written Materials Provided, Discussion, Comprehends Key Points  Psychosocial (emotional response to diabetes): Individual Session, Written Materials Provided  Diabetes Distress and Support Systems: Individual Session, Written Materials Provided, Needs Review, Discussion  Behavioral (readiness for change, lifestyle practices, self-care behaviors): Individual Session, Written Materials Provided, Discussion, Needs Review  Patient educated on what is DM, T1DM, T2DM, risk factors, managing DM, DM diet, carbohydrate counting, meal planning, reading a food label, healthy snack options, benefits of physical activity, diabetes care schedule, foot care guidelines, diabetes and retinopathy screening, s/s hypo and hyperglycemia, long/short term complication of uncontrolled DM, importance of compliance with treatment plan, how to use a glucometer, reviewed understanding diabetes distress, medications for treating DM, their mechanism of action and possible side effects, reviewed current level and goal level for HgbA1c, blood glucose, microalbumin, and lipids. Patient provided with written literature, diabetes management resources and support, DM Management program contact information.    Goals  Patient has selected/evaluated goals during today's session: No         Diabetes Care Plan/Intervention  Education Plan/Intervention: Individual Follow-Up DSMT    Diabetes Meal Plan  Restrictions: Low Fat, Low Sodium, Restricted Carbohydrate  Calories: 1800  Carbohydrate Per Meal: 30-45g  Carbohydrate Per Snack : 7-15g  Fat: 50  Protein: 135    Today's Self-Management Care Plan was developed with the patient's input and is based on barriers identified during today's assessment.    The long and  short-term goals in the care plan were written with the patient/caregiver's input. The patient has agreed to work toward these goals to improve his overall diabetes control.      The patient received a copy of today's self-management plan and verbalized understanding of the care plan, goals, and all of today's instructions.      The patient was encouraged to communicate with his physician and care team regarding his condition(s) and treatment.  I provided the patient with my contact information today and encouraged him to contact me via phone or patient portal as needed.     Education Units of Time   Time Spent: 60 min

## 2020-11-03 ENCOUNTER — OFFICE VISIT (OUTPATIENT)
Dept: PRIMARY CARE CLINIC | Facility: CLINIC | Age: 70
End: 2020-11-03
Payer: MEDICARE

## 2020-11-03 VITALS
WEIGHT: 219 LBS | TEMPERATURE: 98 F | BODY MASS INDEX: 28.11 KG/M2 | SYSTOLIC BLOOD PRESSURE: 128 MMHG | OXYGEN SATURATION: 99 % | HEART RATE: 82 BPM | DIASTOLIC BLOOD PRESSURE: 80 MMHG | HEIGHT: 74 IN | RESPIRATION RATE: 18 BRPM

## 2020-11-03 DIAGNOSIS — M54.50 ACUTE MIDLINE LOW BACK PAIN WITHOUT SCIATICA: Primary | ICD-10-CM

## 2020-11-03 DIAGNOSIS — K56.600 PARTIAL SMALL BOWEL OBSTRUCTION: ICD-10-CM

## 2020-11-03 DIAGNOSIS — E11.42 TYPE 2 DIABETES MELLITUS WITH DIABETIC POLYNEUROPATHY, WITHOUT LONG-TERM CURRENT USE OF INSULIN: ICD-10-CM

## 2020-11-03 DIAGNOSIS — S51.812D LACERATION OF LEFT FOREARM, SUBSEQUENT ENCOUNTER: ICD-10-CM

## 2020-11-03 DIAGNOSIS — Z48.02 ENCOUNTER FOR REMOVAL OF SUTURES: ICD-10-CM

## 2020-11-03 DIAGNOSIS — K11.21 ACUTE PAROTITIS: ICD-10-CM

## 2020-11-03 PROCEDURE — 3008F PR BODY MASS INDEX (BMI) DOCUMENTED: ICD-10-PCS | Mod: CPTII,S$GLB,, | Performed by: INTERNAL MEDICINE

## 2020-11-03 PROCEDURE — 3008F BODY MASS INDEX DOCD: CPT | Mod: CPTII,S$GLB,, | Performed by: INTERNAL MEDICINE

## 2020-11-03 PROCEDURE — 99999 PR PBB SHADOW E&M-EST. PATIENT-LVL IV: ICD-10-PCS | Mod: PBBFAC,,, | Performed by: INTERNAL MEDICINE

## 2020-11-03 PROCEDURE — 3044F HG A1C LEVEL LT 7.0%: CPT | Mod: CPTII,S$GLB,, | Performed by: INTERNAL MEDICINE

## 2020-11-03 PROCEDURE — 3074F PR MOST RECENT SYSTOLIC BLOOD PRESSURE < 130 MM HG: ICD-10-PCS | Mod: CPTII,S$GLB,, | Performed by: INTERNAL MEDICINE

## 2020-11-03 PROCEDURE — 1159F PR MEDICATION LIST DOCUMENTED IN MEDICAL RECORD: ICD-10-PCS | Mod: S$GLB,,, | Performed by: INTERNAL MEDICINE

## 2020-11-03 PROCEDURE — 1101F PR PT FALLS ASSESS DOC 0-1 FALLS W/OUT INJ PAST YR: ICD-10-PCS | Mod: CPTII,S$GLB,, | Performed by: INTERNAL MEDICINE

## 2020-11-03 PROCEDURE — 99999 PR PBB SHADOW E&M-EST. PATIENT-LVL IV: CPT | Mod: PBBFAC,,, | Performed by: INTERNAL MEDICINE

## 2020-11-03 PROCEDURE — 99214 OFFICE O/P EST MOD 30 MIN: CPT | Mod: S$GLB,,, | Performed by: INTERNAL MEDICINE

## 2020-11-03 PROCEDURE — 1101F PT FALLS ASSESS-DOCD LE1/YR: CPT | Mod: CPTII,S$GLB,, | Performed by: INTERNAL MEDICINE

## 2020-11-03 PROCEDURE — 3079F PR MOST RECENT DIASTOLIC BLOOD PRESSURE 80-89 MM HG: ICD-10-PCS | Mod: CPTII,S$GLB,, | Performed by: INTERNAL MEDICINE

## 2020-11-03 PROCEDURE — 3079F DIAST BP 80-89 MM HG: CPT | Mod: CPTII,S$GLB,, | Performed by: INTERNAL MEDICINE

## 2020-11-03 PROCEDURE — 1159F MED LIST DOCD IN RCRD: CPT | Mod: S$GLB,,, | Performed by: INTERNAL MEDICINE

## 2020-11-03 PROCEDURE — 3044F PR MOST RECENT HEMOGLOBIN A1C LEVEL <7.0%: ICD-10-PCS | Mod: CPTII,S$GLB,, | Performed by: INTERNAL MEDICINE

## 2020-11-03 PROCEDURE — 99214 PR OFFICE/OUTPT VISIT, EST, LEVL IV, 30-39 MIN: ICD-10-PCS | Mod: S$GLB,,, | Performed by: INTERNAL MEDICINE

## 2020-11-03 PROCEDURE — 3074F SYST BP LT 130 MM HG: CPT | Mod: CPTII,S$GLB,, | Performed by: INTERNAL MEDICINE

## 2020-11-03 PROCEDURE — 1125F AMNT PAIN NOTED PAIN PRSNT: CPT | Mod: S$GLB,,, | Performed by: INTERNAL MEDICINE

## 2020-11-03 PROCEDURE — 1125F PR PAIN SEVERITY QUANTIFIED, PAIN PRESENT: ICD-10-PCS | Mod: S$GLB,,, | Performed by: INTERNAL MEDICINE

## 2020-11-03 RX ORDER — AMOXICILLIN AND CLAVULANATE POTASSIUM 875; 125 MG/1; MG/1
1 TABLET, FILM COATED ORAL EVERY 12 HOURS
Qty: 20 TABLET | Refills: 0 | Status: SHIPPED | OUTPATIENT
Start: 2020-11-03 | End: 2020-12-09

## 2020-11-03 RX ORDER — PREDNISONE 20 MG/1
20 TABLET ORAL 2 TIMES DAILY
Qty: 10 TABLET | Refills: 0 | Status: SHIPPED | OUTPATIENT
Start: 2020-11-03 | End: 2020-11-03 | Stop reason: SDUPTHER

## 2020-11-03 RX ORDER — TIZANIDINE 4 MG/1
4 TABLET ORAL 2 TIMES DAILY PRN
Qty: 30 TABLET | Refills: 0 | Status: SHIPPED | OUTPATIENT
Start: 2020-11-03 | End: 2020-11-13

## 2020-11-03 RX ORDER — HYDROCODONE BITARTRATE AND ACETAMINOPHEN 5; 325 MG/1; MG/1
1 TABLET ORAL EVERY 8 HOURS PRN
Qty: 20 TABLET | Refills: 0 | Status: SHIPPED | OUTPATIENT
Start: 2020-11-03 | End: 2020-11-03 | Stop reason: SDUPTHER

## 2020-11-03 RX ORDER — AMOXICILLIN AND CLAVULANATE POTASSIUM 875; 125 MG/1; MG/1
1 TABLET, FILM COATED ORAL EVERY 12 HOURS
Qty: 20 TABLET | Refills: 0 | Status: SHIPPED | OUTPATIENT
Start: 2020-11-03 | End: 2020-11-03 | Stop reason: SDUPTHER

## 2020-11-03 RX ORDER — HYDROCODONE BITARTRATE AND ACETAMINOPHEN 5; 325 MG/1; MG/1
1 TABLET ORAL EVERY 8 HOURS PRN
Qty: 20 TABLET | Refills: 0 | Status: ON HOLD | OUTPATIENT
Start: 2020-11-03 | End: 2020-11-12 | Stop reason: HOSPADM

## 2020-11-03 RX ORDER — PREDNISONE 20 MG/1
20 TABLET ORAL 2 TIMES DAILY
Qty: 10 TABLET | Refills: 0 | Status: ON HOLD | OUTPATIENT
Start: 2020-11-03 | End: 2020-11-12 | Stop reason: HOSPADM

## 2020-11-03 RX ORDER — TIZANIDINE 4 MG/1
4 TABLET ORAL 2 TIMES DAILY PRN
Qty: 30 TABLET | Refills: 0 | Status: SHIPPED | OUTPATIENT
Start: 2020-11-03 | End: 2020-11-03 | Stop reason: SDUPTHER

## 2020-11-03 NOTE — PROGRESS NOTES
Subjective:       Patient ID: Jarrett Charlton Jr. is a 70 y.o. male.    Chief Complaint: Follow-up, Suture / Staple Removal, and Back Pain    HPI  patient is here have suture removal on the left forearm likes aeration status for suture 10 days ago in emergency room it happened after patient fell and got cut on the left forearm the wound is healing nicely no infection no drainage no complication he also complained of lower back pain for the last 3 weeks getting worse increase in intensity he deny trauma dysuria hematuria he has similar back pain in the past but is see get better with medication he never had a MRI done in the past patient also scheduled for surgery to remove abdominal scar that causing recurrent small-bowel obstruction next week with surgery he denies short of breath chest pain dyspnea with exertion.  He also complained of swelling on the left jaw it in the back for few day tender and slight dry mouth  Review of Systems    Objective:      Physical Exam  Vitals signs and nursing note reviewed.   Constitutional:       General: He is not in acute distress.     Appearance: He is well-developed.   HENT:      Head: Normocephalic and atraumatic.      Right Ear: External ear normal.      Left Ear: External ear normal.      Nose: Nose normal.      Mouth/Throat:      Mouth: Mucous membranes are moist.      Pharynx: No oropharyngeal exudate.   Eyes:      Extraocular Movements: Extraocular movements intact.      Conjunctiva/sclera: Conjunctivae normal.      Pupils: Pupils are equal, round, and reactive to light.   Neck:      Musculoskeletal: Normal range of motion and neck supple.      Thyroid: No thyromegaly.   Cardiovascular:      Rate and Rhythm: Normal rate and regular rhythm.      Heart sounds: Normal heart sounds. No murmur. No friction rub. No gallop.    Pulmonary:      Effort: Pulmonary effort is normal. No respiratory distress.      Breath sounds: Normal breath sounds. No wheezing or rales.   Abdominal:       General: Bowel sounds are normal. There is no distension.      Palpations: Abdomen is soft.      Tenderness: There is no abdominal tenderness. There is no guarding.   Musculoskeletal: Normal range of motion.         General: No tenderness or deformity.   Lymphadenopathy:      Cervical: No cervical adenopathy.   Skin:     General: Skin is warm and dry.      Capillary Refill: Capillary refill takes less than 2 seconds.      Findings: No erythema or rash.   Neurological:      General: No focal deficit present.      Mental Status: He is alert and oriented to person, place, and time.   Psychiatric:         Thought Content: Thought content normal.         Judgment: Judgment normal.         Assessment:       1. Acute midline low back pain without sciatica    2. Type 2 diabetes mellitus with diabetic polyneuropathy, without long-term current use of insulin    3. Partial small bowel obstruction    4. Acute parotitis    5. Laceration of left forearm, subsequent encounter    6. Encounter for removal of sutures        Plan:       Acute midline low back pain without sciatica  Comments:  xray LS if not better  Orders:  -     Discontinue: tiZANidine (ZANAFLEX) 4 MG tablet; Take 1 tablet (4 mg total) by mouth 2 (two) times daily as needed.  Dispense: 30 tablet; Refill: 0  -     Discontinue: HYDROcodone-acetaminophen (NORCO) 5-325 mg per tablet; Take 1 tablet by mouth every 8 (eight) hours as needed for Pain.  Dispense: 20 tablet; Refill: 0  -     Discontinue: predniSONE (DELTASONE) 20 MG tablet; Take 1 tablet (20 mg total) by mouth 2 (two) times daily. for 5 days  Dispense: 10 tablet; Refill: 0  -     predniSONE (DELTASONE) 20 MG tablet; Take 1 tablet (20 mg total) by mouth 2 (two) times daily. for 5 days  Dispense: 10 tablet; Refill: 0  -     tiZANidine (ZANAFLEX) 4 MG tablet; Take 1 tablet (4 mg total) by mouth 2 (two) times daily as needed.  Dispense: 30 tablet; Refill: 0  -     HYDROcodone-acetaminophen (NORCO) 5-325 mg  per tablet; Take 1 tablet by mouth every 8 (eight) hours as needed for Pain.  Dispense: 20 tablet; Refill: 0    Type 2 diabetes mellitus with diabetic polyneuropathy, without long-term current use of insulin  Comments:  Well controlled with diet medication hemoglobin A1c 6.4 continue with treatment and medication    Partial small bowel obstruction  Comments:  Will get KUB since patient has ileostomy output no complete obstruction will get KUB and treat his symptom medically follow-up with surgery next week  Orders:  -     Discontinue: HYDROcodone-acetaminophen (NORCO) 5-325 mg per tablet; Take 1 tablet by mouth every 8 (eight) hours as needed for Pain.  Dispense: 20 tablet; Refill: 0  -     HYDROcodone-acetaminophen (NORCO) 5-325 mg per tablet; Take 1 tablet by mouth every 8 (eight) hours as needed for Pain.  Dispense: 20 tablet; Refill: 0    Acute parotitis  -     Discontinue: amoxicillin-clavulanate 875-125mg (AUGMENTIN) 875-125 mg per tablet; Take 1 tablet by mouth every 12 (twelve) hours.  Dispense: 20 tablet; Refill: 0  -     Discontinue: predniSONE (DELTASONE) 20 MG tablet; Take 1 tablet (20 mg total) by mouth 2 (two) times daily. for 5 days  Dispense: 10 tablet; Refill: 0  -     amoxicillin-clavulanate 875-125mg (AUGMENTIN) 875-125 mg per tablet; Take 1 tablet by mouth every 12 (twelve) hours.  Dispense: 20 tablet; Refill: 0  -     predniSONE (DELTASONE) 20 MG tablet; Take 1 tablet (20 mg total) by mouth 2 (two) times daily. for 5 days  Dispense: 10 tablet; Refill: 0    Laceration of left forearm, subsequent encounter  Comments:  Status post suture wound healing well removal own suture without any complication    Encounter for removal of sutures    Other orders  -     Diabetes Digital Medicine (DDMP) Enrollment Order  -     Diabetes Digital Medicine (DDMP): Assign Onboarding Questionnaires  -     NURSING COMMUNICATION: Create [facility] Account  -     NURSING COMMUNICATION: Create [facility] Account  -      Hypertension Digital Medicine ([facility]) Enrollment Order  -     Hypertension Digital Medicine ([facility]): Assign Onboarding Questionnaires        Medication List with Changes/Refills   New Medications    AMOXICILLIN-CLAVULANATE 875-125MG (AUGMENTIN) 875-125 MG PER TABLET    Take 1 tablet by mouth every 12 (twelve) hours.    PREDNISONE (DELTASONE) 20 MG TABLET    Take 1 tablet (20 mg total) by mouth 2 (two) times daily. for 5 days    TIZANIDINE (ZANAFLEX) 4 MG TABLET    Take 1 tablet (4 mg total) by mouth 2 (two) times daily as needed.   Current Medications    ACCU-CHEK PAUL CONTROL SOLN SOLN    1 drop by NOT APPLICABLE route once daily.    ACCU-CHEK SOFTCLIX LANCETS MISC    1 lancet by NOT APPLICABLE route once daily.    ALLOPURINOL (ZYLOPRIM) 300 MG TABLET    Take 1.5 tablets (450 mg total) by mouth once daily.    AMITRIPTYLINE (ELAVIL) 25 MG TABLET    Take 2 tablets (50 mg total) by mouth every evening.    ASPIRIN (ECOTRIN) 81 MG EC TABLET    Take 81 mg by mouth once daily.      BD ALCOHOL SWABS PAD        BLOOD SUGAR DIAGNOSTIC (ONE TOUCH ULTRA TEST) [facility]    USE ONE STRIP TO CHECK GLUCOSE EVERY DAY    BRIMONIDINE 0.2% (ALPHAGAN) 0.2 % DROP    INSTILL 1 DROP INTO EACH EYE TWICE DAILY    CARVEDILOL (COREG) 12.5 MG TABLET    Take 12.5 mg by mouth 2 (two) times daily.     CLONIDINE (CATAPRES) 0.1 MG TABLET    Take 0.5 mg by mouth once daily.    CLOPIDOGREL (PLAVIX) 75 MG TABLET    once daily.     DICYCLOMINE (BENTYL) 10 MG CAPSULE    Take 1 capsule (10 mg total) by mouth 4 (four) times daily before meals and nightly.    ERGOCALCIFEROL, VITAMIN D2, (VITAMIN D ORAL)    Take by mouth.    ESCITALOPRAM OXALATE (LEXAPRO) 10 MG TABLET    Take 10 mg by mouth once daily.    GABAPENTIN (NEURONTIN) 600 MG TABLET    Take 1 tablet (600 mg total) by mouth 2 (two) times daily.    GLIMEPIRIDE (AMARYL) 4 MG TABLET    Take 1 tablet (4 mg total) by mouth daily with breakfast.    LOPERAMIDE (IMODIUM A-D) 2 MG TAB    Take 2 mg by mouth once  daily.    LORAZEPAM (ATIVAN) 0.5 MG TABLET    Take 0.5 mg by mouth every evening.    MUPIROCIN (BACTROBAN) 2 % OINTMENT    Apply topically 2 (two) times daily.    OMEPRAZOLE (PRILOSEC) 40 MG CAPSULE    Take 1 capsule (40 mg total) by mouth once daily.    ROSUVASTATIN (CRESTOR) 10 MG TABLET    Take 1 tablet (10 mg total) by mouth every evening.    SIMETHICONE (MYLICON) 125 MG CAP CAPSULE    Take 1 capsule (125 mg total) by mouth 4 (four) times daily as needed for Flatulence (gas).   Changed and/or Refilled Medications    Modified Medication Previous Medication    HYDROCODONE-ACETAMINOPHEN (NORCO) 5-325 MG PER TABLET HYDROcodone-acetaminophen (NORCO) 5-325 mg per tablet       Take 1 tablet by mouth every 8 (eight) hours as needed for Pain.    Take 1 tablet by mouth every 8 (eight) hours as needed for Pain.   Discontinued Medications    ALPRAZOLAM (XANAX) 0.5 MG TABLET    Take 1 tablet (0.5 mg total) by mouth 2 (two) times daily as needed (tinnitis).    INV AMLODIPINE (NORVASC) 5 MG TAB    Take 5 mg by mouth once daily.     LINAGLIPTIN (TRADJENTA) 5 MG TAB TABLET    Take 1 tablet (5 mg total) by mouth once daily.    ROSUVASTATIN (CRESTOR) 10 MG TABLET    Take 1 tablet (10 mg total) by mouth every evening.

## 2020-11-03 NOTE — PROGRESS NOTES
Verified pt ID using name and . NKDA. Removed 6 sutures from left forearm after mole removal. Pt tolerated well with no adverse reactions noted.

## 2020-11-04 ENCOUNTER — CLINICAL SUPPORT (OUTPATIENT)
Dept: PRIMARY CARE CLINIC | Facility: CLINIC | Age: 70
End: 2020-11-04
Payer: MEDICARE

## 2020-11-04 ENCOUNTER — TELEPHONE (OUTPATIENT)
Dept: PRIMARY CARE CLINIC | Facility: CLINIC | Age: 70
End: 2020-11-04

## 2020-11-04 DIAGNOSIS — E34.0 CARCINOID SYNDROME: ICD-10-CM

## 2020-11-04 DIAGNOSIS — R23.2 HOT FLASHES: ICD-10-CM

## 2020-11-04 DIAGNOSIS — G43.809 OTHER MIGRAINE WITHOUT STATUS MIGRAINOSUS, NOT INTRACTABLE: Primary | ICD-10-CM

## 2020-11-04 DIAGNOSIS — R61 EXCESSIVE SWEATING: ICD-10-CM

## 2020-11-04 PROCEDURE — 96372 THER/PROPH/DIAG INJ SC/IM: CPT | Mod: S$GLB,,, | Performed by: INTERNAL MEDICINE

## 2020-11-04 PROCEDURE — 96372 PR INJECTION,THERAP/PROPH/DIAG2ST, IM OR SUBCUT: ICD-10-PCS | Mod: S$GLB,,, | Performed by: INTERNAL MEDICINE

## 2020-11-04 RX ORDER — TRIAMCINOLONE ACETONIDE 40 MG/ML
60 INJECTION, SUSPENSION INTRA-ARTICULAR; INTRAMUSCULAR ONCE
Status: COMPLETED | OUTPATIENT
Start: 2020-11-04 | End: 2020-11-04

## 2020-11-04 RX ADMIN — TRIAMCINOLONE ACETONIDE 60 MG: 40 INJECTION, SUSPENSION INTRA-ARTICULAR; INTRAMUSCULAR at 11:11

## 2020-11-04 NOTE — TELEPHONE ENCOUNTER
----- Message from Zara Arguello sent at 11/4/2020  8:51 AM CST -----  Contact: [phone]  Would like to get medical advice.  Symptoms (please be specific):  left side of face is swollen more, patient was seen on yesterday and condition is getting worse, please call and advise.   How long has patient had these symptoms:    Pharmacy name and phone # (copy from chart):    Comments:

## 2020-11-04 NOTE — PROGRESS NOTES
Verified pt ID using name and . NKDA. Administered Kenalog 60 mg IM in Left VG per physician order using aseptic technique. Aspirated and no blood return noted. Pt tolerated well with no adverse reactions noted.

## 2020-11-05 ENCOUNTER — TELEPHONE (OUTPATIENT)
Dept: PRIMARY CARE CLINIC | Facility: CLINIC | Age: 70
End: 2020-11-05

## 2020-11-05 NOTE — TELEPHONE ENCOUNTER
Spoke to pt. And and explained that Dr. Gonzalez isn't back in the office until next week but I did message him with his concerns. I explained per Dr. Gonzalez he  Should reschedule surgery and if not getting better or getting worse to go to the ER . Patient understood .

## 2020-11-05 NOTE — TELEPHONE ENCOUNTER
----- Message from Jess Silverio sent at 11/5/2020  8:11 AM CST -----  Contact: Patient, [phone]  Would like to get medical advice.  Symptoms (please be specific):  Face swelling  How long has patient had these symptoms:  Since Tuesday  Pharmacy name and phone # (copy from chart):    [place] [id number] [facility] [address] [facility]  [address] [facility] [address]  Phone: [phone] Fax: [phone]  Comments:  Calling because his face is still swelling, wants to know if he should postpone his surgery scheduled for Monday. Please call him. Thanks.

## 2020-11-06 ENCOUNTER — TELEPHONE (OUTPATIENT)
Dept: SURGERY | Facility: CLINIC | Age: 70
End: 2020-11-06

## 2020-11-06 ENCOUNTER — TELEPHONE (OUTPATIENT)
Dept: DIABETES | Facility: CLINIC | Age: 70
End: 2020-11-06

## 2020-11-06 RX ORDER — AMLODIPINE BESYLATE 5 MG/1
5 TABLET ORAL DAILY
COMMUNITY
End: 2021-04-07 | Stop reason: CLARIF

## 2020-11-06 RX ORDER — LANOLIN ALCOHOL/MO/W.PET/CERES
400 CREAM (GRAM) TOPICAL 2 TIMES DAILY
COMMUNITY
End: 2021-04-07 | Stop reason: CLARIF

## 2020-11-06 NOTE — TELEPHONE ENCOUNTER
Calling patient to review Professional CGM results.   No changes --continue glimepiride 4 mg daily  He is staying off of the Tradjenta due to cost  Overall well controlled  Reviewed diabetic diet  All questions answered

## 2020-11-06 NOTE — PRE-PROCEDURE INSTRUCTIONS
PREOP INSTRUCTIONS:    No solid food ,milk or milk products for 8 hours prior to procedure.Clear liquids are allowed up to 2 hours before procedure.Clear liquids are:water,apple juice,gatorade & powerade.Shower instructions as well as directions to the [facility] Center were given.Patient encouraged to wear loose fitting,comfortable clothing.Medication instructions for pm prior to and am of procedure reviewed.Instructed patient to avoid taking vitamins,supplements,aspirin and ibuprofen the morning of surgery. Patient stated an understanding.Patient instructed to take temperature the night before surgery as well as the morning of surgery and to notify [facility] at [phone] if it is 100.4 or above.Patient also informed of the current visitor policy and advised patient that one visitor may accompany them into the hospital and wait (socially distanced) .When they enter the hospital both patient and visitor will have their temperature checked,provided a mask and provided assistance to their destination.     Inpatients are allowed 1 visitor/day from 8 am until 6 pm.          Patient denies any side effects or issues with anesthesia or sedation.    ILEOSTOMY

## 2020-11-06 NOTE — TELEPHONE ENCOUNTER
----- Message from Marybeth Carroll sent at 11/6/2020 12:44 PM CST -----  Contact: Pt @ [phone]  Pt calling to speak with staff regarding arrival time and instruction for upcoming procedure on Monday 11/09. Please follow up with Pt regarding information for procedure.           Please contact Pt @ [phone]

## 2020-11-09 ENCOUNTER — HOSPITAL ENCOUNTER (INPATIENT)
Facility: HOSPITAL | Age: 70
LOS: 3 days | Discharge: HOME | DRG: 336 | End: 2020-11-12
Attending: COLON & RECTAL SURGERY | Admitting: COLON & RECTAL SURGERY
Payer: MEDICARE

## 2020-11-09 ENCOUNTER — ANESTHESIA (OUTPATIENT)
Dept: SURGERY | Facility: HOSPITAL | Age: 70
DRG: 336 | End: 2020-11-09
Payer: MEDICARE

## 2020-11-09 ENCOUNTER — ANESTHESIA EVENT (OUTPATIENT)
Dept: SURGERY | Facility: HOSPITAL | Age: 70
DRG: 336 | End: 2020-11-09
Payer: MEDICARE

## 2020-11-09 DIAGNOSIS — K56.600 PARTIAL SMALL BOWEL OBSTRUCTION: ICD-10-CM

## 2020-11-09 DIAGNOSIS — Z87.19 HISTORY OF ULCERATIVE COLITIS: ICD-10-CM

## 2020-11-09 DIAGNOSIS — Z93.2 ILEOSTOMY IN PLACE: Primary | ICD-10-CM

## 2020-11-09 LAB
CREAT SERPL-MCNC: 1.5 MG/DL (ref 0.5–1.4)
EST. GFR  (AFRICAN AMERICAN): 53.4 ML/MIN/1.73 M^2
EST. GFR  (NON AFRICAN AMERICAN): 46.2 ML/MIN/1.73 M^2
POCT GLUCOSE: 124 MG/DL (ref 70–110)
POCT GLUCOSE: 198 MG/DL (ref 70–110)

## 2020-11-09 PROCEDURE — 37000008 HC ANESTHESIA 1ST 15 MINUTES: Performed by: COLON & RECTAL SURGERY

## 2020-11-09 PROCEDURE — 71000039 HC RECOVERY, EACH ADD'L HOUR: Performed by: COLON & RECTAL SURGERY

## 2020-11-09 PROCEDURE — 36000706: Performed by: COLON & RECTAL SURGERY

## 2020-11-09 PROCEDURE — 25000003 PHARM REV CODE 250: Performed by: STUDENT IN AN ORGANIZED HEALTH CARE EDUCATION/TRAINING PROGRAM

## 2020-11-09 PROCEDURE — D9220A PRA ANESTHESIA: Mod: ANES,,, | Performed by: ANESTHESIOLOGY

## 2020-11-09 PROCEDURE — 45399: ICD-10-PCS | Mod: ,,, | Performed by: COLON & RECTAL SURGERY

## 2020-11-09 PROCEDURE — 44005 PR FREEING BOWEL ADHESION,ENTEROLYSIS: ICD-10-PCS | Mod: 22,,, | Performed by: COLON & RECTAL SURGERY

## 2020-11-09 PROCEDURE — 45399 UNLISTED PROCEDURE COLON: CPT | Mod: ,,, | Performed by: COLON & RECTAL SURGERY

## 2020-11-09 PROCEDURE — 82962 GLUCOSE BLOOD TEST: CPT | Performed by: COLON & RECTAL SURGERY

## 2020-11-09 PROCEDURE — D9220A PRA ANESTHESIA: Mod: CRNA,,, | Performed by: NURSE ANESTHETIST, CERTIFIED REGISTERED

## 2020-11-09 PROCEDURE — 82565 ASSAY OF CREATININE: CPT

## 2020-11-09 PROCEDURE — 37000009 HC ANESTHESIA EA ADD 15 MINS: Performed by: COLON & RECTAL SURGERY

## 2020-11-09 PROCEDURE — 94761 N-INVAS EAR/PLS OXIMETRY MLT: CPT

## 2020-11-09 PROCEDURE — S0030 INJECTION, METRONIDAZOLE: HCPCS | Performed by: NURSE ANESTHETIST, CERTIFIED REGISTERED

## 2020-11-09 PROCEDURE — 25000003 PHARM REV CODE 250: Performed by: NURSE ANESTHETIST, CERTIFIED REGISTERED

## 2020-11-09 PROCEDURE — 71000015 HC POSTOP RECOV 1ST HR: Performed by: COLON & RECTAL SURGERY

## 2020-11-09 PROCEDURE — 63600175 PHARM REV CODE 636 W HCPCS: Performed by: NURSE ANESTHETIST, CERTIFIED REGISTERED

## 2020-11-09 PROCEDURE — 63600175 PHARM REV CODE 636 W HCPCS

## 2020-11-09 PROCEDURE — 20600001 HC STEP DOWN PRIVATE ROOM

## 2020-11-09 PROCEDURE — 71000016 HC POSTOP RECOV ADDL HR: Performed by: COLON & RECTAL SURGERY

## 2020-11-09 PROCEDURE — 63600175 PHARM REV CODE 636 W HCPCS: Performed by: ANESTHESIOLOGY

## 2020-11-09 PROCEDURE — D9220A PRA ANESTHESIA: ICD-10-PCS | Mod: CRNA,,, | Performed by: NURSE ANESTHETIST, CERTIFIED REGISTERED

## 2020-11-09 PROCEDURE — 44005 FREEING OF BOWEL ADHESION: CPT | Mod: 22,,, | Performed by: COLON & RECTAL SURGERY

## 2020-11-09 PROCEDURE — 63600175 PHARM REV CODE 636 W HCPCS: Performed by: STUDENT IN AN ORGANIZED HEALTH CARE EDUCATION/TRAINING PROGRAM

## 2020-11-09 PROCEDURE — 25000003 PHARM REV CODE 250: Performed by: COLON & RECTAL SURGERY

## 2020-11-09 PROCEDURE — C1765 ADHESION BARRIER: HCPCS | Performed by: COLON & RECTAL SURGERY

## 2020-11-09 PROCEDURE — 36415 COLL VENOUS BLD VENIPUNCTURE: CPT

## 2020-11-09 PROCEDURE — 36000707: Performed by: COLON & RECTAL SURGERY

## 2020-11-09 PROCEDURE — C1781 MESH (IMPLANTABLE): HCPCS | Performed by: COLON & RECTAL SURGERY

## 2020-11-09 PROCEDURE — 27201423 OPTIME MED/SURG SUP & DEVICES STERILE SUPPLY: Performed by: COLON & RECTAL SURGERY

## 2020-11-09 PROCEDURE — D9220A PRA ANESTHESIA: ICD-10-PCS | Mod: ANES,,, | Performed by: ANESTHESIOLOGY

## 2020-11-09 PROCEDURE — 71000033 HC RECOVERY, INTIAL HOUR: Performed by: COLON & RECTAL SURGERY

## 2020-11-09 DEVICE — MESH PHASIX ST RECT 15CMX20CM: Type: IMPLANTABLE DEVICE | Site: ABDOMEN | Status: FUNCTIONAL

## 2020-11-09 DEVICE — BARRIER SEPRAFILM ADHESION: Type: IMPLANTABLE DEVICE | Site: ABDOMEN | Status: FUNCTIONAL

## 2020-11-09 RX ORDER — DEXAMETHASONE SODIUM PHOSPHATE 4 MG/ML
INJECTION, SOLUTION INTRA-ARTICULAR; INTRALESIONAL; INTRAMUSCULAR; INTRAVENOUS; SOFT TISSUE
Status: DISCONTINUED | OUTPATIENT
Start: 2020-11-09 | End: 2020-11-09

## 2020-11-09 RX ORDER — MIDAZOLAM HYDROCHLORIDE 1 MG/ML
INJECTION, SOLUTION INTRAMUSCULAR; INTRAVENOUS
Status: DISCONTINUED | OUTPATIENT
Start: 2020-11-09 | End: 2020-11-09

## 2020-11-09 RX ORDER — SODIUM CHLORIDE 0.9 % (FLUSH) 0.9 %
10 SYRINGE (ML) INJECTION
Status: DISCONTINUED | OUTPATIENT
Start: 2020-11-09 | End: 2020-11-12 | Stop reason: HOSPADM

## 2020-11-09 RX ORDER — TRAMADOL HYDROCHLORIDE 50 MG/1
50 TABLET ORAL EVERY 6 HOURS PRN
Status: DISCONTINUED | OUTPATIENT
Start: 2020-11-09 | End: 2020-11-12 | Stop reason: HOSPADM

## 2020-11-09 RX ORDER — GABAPENTIN 300 MG/1
300 CAPSULE ORAL 3 TIMES DAILY
Status: DISCONTINUED | OUTPATIENT
Start: 2020-11-09 | End: 2020-11-09

## 2020-11-09 RX ORDER — CARVEDILOL 12.5 MG/1
12.5 TABLET ORAL 2 TIMES DAILY
Status: DISCONTINUED | OUTPATIENT
Start: 2020-11-09 | End: 2020-11-12 | Stop reason: HOSPADM

## 2020-11-09 RX ORDER — SODIUM CHLORIDE 9 MG/ML
INJECTION, SOLUTION INTRAVENOUS CONTINUOUS
Status: DISCONTINUED | OUTPATIENT
Start: 2020-11-09 | End: 2020-11-09

## 2020-11-09 RX ORDER — ROCURONIUM BROMIDE 10 MG/ML
INJECTION, SOLUTION INTRAVENOUS
Status: DISCONTINUED | OUTPATIENT
Start: 2020-11-09 | End: 2020-11-09

## 2020-11-09 RX ORDER — PHENYLEPHRINE HYDROCHLORIDE 10 MG/ML
INJECTION INTRAVENOUS
Status: DISCONTINUED | OUTPATIENT
Start: 2020-11-09 | End: 2020-11-09

## 2020-11-09 RX ORDER — ONDANSETRON 2 MG/ML
4 INJECTION INTRAMUSCULAR; INTRAVENOUS EVERY 12 HOURS PRN
Status: DISCONTINUED | OUTPATIENT
Start: 2020-11-09 | End: 2020-11-12 | Stop reason: HOSPADM

## 2020-11-09 RX ORDER — SODIUM CHLORIDE 9 MG/ML
INJECTION, SOLUTION INTRAVENOUS CONTINUOUS
Status: DISCONTINUED | OUTPATIENT
Start: 2020-11-09 | End: 2020-11-11

## 2020-11-09 RX ORDER — AMITRIPTYLINE HYDROCHLORIDE 25 MG/1
50 TABLET, FILM COATED ORAL NIGHTLY
Status: DISCONTINUED | OUTPATIENT
Start: 2020-11-09 | End: 2020-11-12 | Stop reason: HOSPADM

## 2020-11-09 RX ORDER — GABAPENTIN 300 MG/1
600 CAPSULE ORAL 2 TIMES DAILY
Status: DISCONTINUED | OUTPATIENT
Start: 2020-11-09 | End: 2020-11-12 | Stop reason: HOSPADM

## 2020-11-09 RX ORDER — AMLODIPINE BESYLATE 5 MG/1
5 TABLET ORAL DAILY
Status: DISCONTINUED | OUTPATIENT
Start: 2020-11-10 | End: 2020-11-12 | Stop reason: HOSPADM

## 2020-11-09 RX ORDER — IBUPROFEN 400 MG/1
800 TABLET ORAL EVERY 8 HOURS
Status: DISCONTINUED | OUTPATIENT
Start: 2020-11-10 | End: 2020-11-12 | Stop reason: HOSPADM

## 2020-11-09 RX ORDER — LIDOCAINE HYDROCHLORIDE 20 MG/ML
INJECTION INTRAVENOUS
Status: DISCONTINUED | OUTPATIENT
Start: 2020-11-09 | End: 2020-11-09

## 2020-11-09 RX ORDER — FENTANYL CITRATE 50 UG/ML
INJECTION, SOLUTION INTRAMUSCULAR; INTRAVENOUS
Status: DISCONTINUED | OUTPATIENT
Start: 2020-11-09 | End: 2020-11-09

## 2020-11-09 RX ORDER — BUPIVACAINE HYDROCHLORIDE 2.5 MG/ML
INJECTION, SOLUTION EPIDURAL; INFILTRATION; INTRACAUDAL
Status: DISCONTINUED | OUTPATIENT
Start: 2020-11-09 | End: 2020-11-09 | Stop reason: HOSPADM

## 2020-11-09 RX ORDER — PROPOFOL 10 MG/ML
VIAL (ML) INTRAVENOUS
Status: DISCONTINUED | OUTPATIENT
Start: 2020-11-09 | End: 2020-11-09

## 2020-11-09 RX ORDER — MUPIROCIN 20 MG/G
OINTMENT TOPICAL
Status: DISCONTINUED | OUTPATIENT
Start: 2020-11-09 | End: 2020-11-09

## 2020-11-09 RX ORDER — MUPIROCIN 20 MG/G
OINTMENT TOPICAL 2 TIMES DAILY
Status: DISCONTINUED | OUTPATIENT
Start: 2020-11-09 | End: 2020-11-12 | Stop reason: HOSPADM

## 2020-11-09 RX ORDER — METRONIDAZOLE 500 MG/100ML
INJECTION, SOLUTION INTRAVENOUS
Status: DISCONTINUED | OUTPATIENT
Start: 2020-11-09 | End: 2020-11-09

## 2020-11-09 RX ORDER — ONDANSETRON 2 MG/ML
INJECTION INTRAMUSCULAR; INTRAVENOUS
Status: DISCONTINUED | OUTPATIENT
Start: 2020-11-09 | End: 2020-11-09

## 2020-11-09 RX ORDER — HEPARIN SODIUM 5000 [USP'U]/ML
5000 INJECTION, SOLUTION INTRAVENOUS; SUBCUTANEOUS ONCE
Status: COMPLETED | OUTPATIENT
Start: 2020-11-09 | End: 2020-11-09

## 2020-11-09 RX ORDER — HYDROMORPHONE HYDROCHLORIDE 1 MG/ML
INJECTION, SOLUTION INTRAMUSCULAR; INTRAVENOUS; SUBCUTANEOUS
Status: COMPLETED
Start: 2020-11-09 | End: 2020-11-09

## 2020-11-09 RX ORDER — TAMSULOSIN HYDROCHLORIDE 0.4 MG/1
0.4 CAPSULE ORAL DAILY
Status: DISCONTINUED | OUTPATIENT
Start: 2020-11-10 | End: 2020-11-12 | Stop reason: HOSPADM

## 2020-11-09 RX ORDER — OXYCODONE HYDROCHLORIDE 5 MG/1
5 TABLET ORAL EVERY 4 HOURS PRN
Status: DISCONTINUED | OUTPATIENT
Start: 2020-11-09 | End: 2020-11-10

## 2020-11-09 RX ORDER — CEFAZOLIN SODIUM 1 G/3ML
2 INJECTION, POWDER, FOR SOLUTION INTRAMUSCULAR; INTRAVENOUS
Status: COMPLETED | OUTPATIENT
Start: 2020-11-09 | End: 2020-11-09

## 2020-11-09 RX ORDER — ENOXAPARIN SODIUM 100 MG/ML
40 INJECTION SUBCUTANEOUS EVERY 24 HOURS
Status: DISCONTINUED | OUTPATIENT
Start: 2020-11-09 | End: 2020-11-12 | Stop reason: HOSPADM

## 2020-11-09 RX ORDER — LIDOCAINE HYDROCHLORIDE ANHYDROUS AND DEXTROSE MONOHYDRATE .8; 5 G/100ML; G/100ML
INJECTION, SOLUTION INTRAVENOUS CONTINUOUS PRN
Status: DISCONTINUED | OUTPATIENT
Start: 2020-11-09 | End: 2020-11-09

## 2020-11-09 RX ORDER — SODIUM CHLORIDE 0.9 % (FLUSH) 0.9 %
10 SYRINGE (ML) INJECTION
Status: DISCONTINUED | OUTPATIENT
Start: 2020-11-09 | End: 2020-11-09 | Stop reason: HOSPADM

## 2020-11-09 RX ORDER — HYDROMORPHONE HYDROCHLORIDE 1 MG/ML
0.2 INJECTION, SOLUTION INTRAMUSCULAR; INTRAVENOUS; SUBCUTANEOUS EVERY 5 MIN PRN
Status: DISCONTINUED | OUTPATIENT
Start: 2020-11-09 | End: 2020-11-09 | Stop reason: HOSPADM

## 2020-11-09 RX ORDER — ACETAMINOPHEN 500 MG
1000 TABLET ORAL EVERY 8 HOURS
Status: DISCONTINUED | OUTPATIENT
Start: 2020-11-10 | End: 2020-11-12 | Stop reason: HOSPADM

## 2020-11-09 RX ORDER — ACETAMINOPHEN 10 MG/ML
INJECTION, SOLUTION INTRAVENOUS
Status: DISCONTINUED | OUTPATIENT
Start: 2020-11-09 | End: 2020-11-09

## 2020-11-09 RX ORDER — HYDROMORPHONE HYDROCHLORIDE 1 MG/ML
1 INJECTION, SOLUTION INTRAMUSCULAR; INTRAVENOUS; SUBCUTANEOUS EVERY 4 HOURS PRN
Status: DISCONTINUED | OUTPATIENT
Start: 2020-11-09 | End: 2020-11-10

## 2020-11-09 RX ORDER — ACETAMINOPHEN 10 MG/ML
1000 INJECTION, SOLUTION INTRAVENOUS EVERY 8 HOURS
Status: COMPLETED | OUTPATIENT
Start: 2020-11-09 | End: 2020-11-10

## 2020-11-09 RX ORDER — KETAMINE HCL IN 0.9 % NACL 50 MG/5 ML
SYRINGE (ML) INTRAVENOUS
Status: DISCONTINUED | OUTPATIENT
Start: 2020-11-09 | End: 2020-11-09

## 2020-11-09 RX ADMIN — HYDROMORPHONE HYDROCHLORIDE 0.2 MG: 1 INJECTION, SOLUTION INTRAMUSCULAR; INTRAVENOUS; SUBCUTANEOUS at 04:11

## 2020-11-09 RX ADMIN — METRONIDAZOLE 500 MG: 500 SOLUTION INTRAVENOUS at 10:11

## 2020-11-09 RX ADMIN — ACETAMINOPHEN 1000 MG: 10 INJECTION, SOLUTION INTRAVENOUS at 11:11

## 2020-11-09 RX ADMIN — MUPIROCIN: 20 OINTMENT TOPICAL at 09:11

## 2020-11-09 RX ADMIN — ROCURONIUM BROMIDE 10 MG: 10 INJECTION, SOLUTION INTRAVENOUS at 02:11

## 2020-11-09 RX ADMIN — SODIUM CHLORIDE, SODIUM GLUCONATE, SODIUM ACETATE, POTASSIUM CHLORIDE, MAGNESIUM CHLORIDE, SODIUM PHOSPHATE, DIBASIC, AND POTASSIUM PHOSPHATE: .53; .5; .37; .037; .03; .012; .00082 INJECTION, SOLUTION INTRAVENOUS at 12:11

## 2020-11-09 RX ADMIN — ROCURONIUM BROMIDE 10 MG: 10 INJECTION, SOLUTION INTRAVENOUS at 03:11

## 2020-11-09 RX ADMIN — ROCURONIUM BROMIDE 50 MG: 10 INJECTION, SOLUTION INTRAVENOUS at 10:11

## 2020-11-09 RX ADMIN — SUGAMMADEX 200 MG: 100 INJECTION, SOLUTION INTRAVENOUS at 03:11

## 2020-11-09 RX ADMIN — PHENYLEPHRINE HYDROCHLORIDE 100 MCG: 10 INJECTION INTRAVENOUS at 10:11

## 2020-11-09 RX ADMIN — CEFAZOLIN 2 G: 330 INJECTION, POWDER, FOR SOLUTION INTRAMUSCULAR; INTRAVENOUS at 10:11

## 2020-11-09 RX ADMIN — ROCURONIUM BROMIDE 10 MG: 10 INJECTION, SOLUTION INTRAVENOUS at 01:11

## 2020-11-09 RX ADMIN — FENTANYL CITRATE 25 MCG: 50 INJECTION, SOLUTION INTRAMUSCULAR; INTRAVENOUS at 10:11

## 2020-11-09 RX ADMIN — ROCURONIUM BROMIDE 20 MG: 10 INJECTION, SOLUTION INTRAVENOUS at 01:11

## 2020-11-09 RX ADMIN — Medication 10 MG: at 01:11

## 2020-11-09 RX ADMIN — PHENYLEPHRINE HYDROCHLORIDE 100 MCG: 10 INJECTION INTRAVENOUS at 02:11

## 2020-11-09 RX ADMIN — Medication 30 MG: at 10:11

## 2020-11-09 RX ADMIN — Medication 10 MG: at 03:11

## 2020-11-09 RX ADMIN — ONDANSETRON 4 MG: 2 INJECTION, SOLUTION INTRAMUSCULAR; INTRAVENOUS at 03:11

## 2020-11-09 RX ADMIN — ONDANSETRON 4 MG: 2 INJECTION, SOLUTION INTRAMUSCULAR; INTRAVENOUS at 10:11

## 2020-11-09 RX ADMIN — ROCURONIUM BROMIDE 10 MG: 10 INJECTION, SOLUTION INTRAVENOUS at 12:11

## 2020-11-09 RX ADMIN — HYDROMORPHONE HYDROCHLORIDE 1 MG: 1 INJECTION, SOLUTION INTRAMUSCULAR; INTRAVENOUS; SUBCUTANEOUS at 09:11

## 2020-11-09 RX ADMIN — PHENYLEPHRINE HYDROCHLORIDE 100 MCG: 10 INJECTION INTRAVENOUS at 12:11

## 2020-11-09 RX ADMIN — PROPOFOL 150 MG: 10 INJECTION, EMULSION INTRAVENOUS at 10:11

## 2020-11-09 RX ADMIN — GABAPENTIN 600 MG: 300 CAPSULE ORAL at 09:11

## 2020-11-09 RX ADMIN — CARVEDILOL 12.5 MG: 12.5 TABLET, FILM COATED ORAL at 09:11

## 2020-11-09 RX ADMIN — FENTANYL CITRATE 25 MCG: 50 INJECTION, SOLUTION INTRAMUSCULAR; INTRAVENOUS at 02:11

## 2020-11-09 RX ADMIN — PHENYLEPHRINE HYDROCHLORIDE 200 MCG: 10 INJECTION INTRAVENOUS at 01:11

## 2020-11-09 RX ADMIN — OXYCODONE HYDROCHLORIDE 5 MG: 5 TABLET ORAL at 11:11

## 2020-11-09 RX ADMIN — FENTANYL CITRATE 50 MCG: 50 INJECTION, SOLUTION INTRAMUSCULAR; INTRAVENOUS at 10:11

## 2020-11-09 RX ADMIN — Medication 10 MG: at 12:11

## 2020-11-09 RX ADMIN — MUPIROCIN: 20 OINTMENT TOPICAL at 10:11

## 2020-11-09 RX ADMIN — Medication 10 MG: at 02:11

## 2020-11-09 RX ADMIN — ACETAMINOPHEN 1000 MG: 10 INJECTION, SOLUTION INTRAVENOUS at 10:11

## 2020-11-09 RX ADMIN — SODIUM CHLORIDE: 0.9 INJECTION, SOLUTION INTRAVENOUS at 11:11

## 2020-11-09 RX ADMIN — LIDOCAINE HYDROCHLORIDE 80 MG: 20 INJECTION, SOLUTION INTRAVENOUS at 10:11

## 2020-11-09 RX ADMIN — PHENYLEPHRINE HYDROCHLORIDE 100 MCG: 10 INJECTION INTRAVENOUS at 11:11

## 2020-11-09 RX ADMIN — LIDOCAINE HYDROCHLORIDE 0.02 MG/KG/MIN: 8 INJECTION, SOLUTION INTRAVENOUS at 10:11

## 2020-11-09 RX ADMIN — HEPARIN SODIUM 5000 UNITS: 5000 INJECTION INTRAVENOUS; SUBCUTANEOUS at 10:11

## 2020-11-09 RX ADMIN — AMITRIPTYLINE HYDROCHLORIDE 50 MG: 25 TABLET, FILM COATED ORAL at 09:11

## 2020-11-09 RX ADMIN — ENOXAPARIN SODIUM 40 MG: 40 INJECTION SUBCUTANEOUS at 09:11

## 2020-11-09 RX ADMIN — SODIUM CHLORIDE: 0.9 INJECTION, SOLUTION INTRAVENOUS at 10:11

## 2020-11-09 RX ADMIN — CEFAZOLIN 2 G: 330 INJECTION, POWDER, FOR SOLUTION INTRAMUSCULAR; INTRAVENOUS at 02:11

## 2020-11-09 RX ADMIN — ROCURONIUM BROMIDE 10 MG: 10 INJECTION, SOLUTION INTRAVENOUS at 11:11

## 2020-11-09 RX ADMIN — MIDAZOLAM HYDROCHLORIDE 1 MG: 1 INJECTION, SOLUTION INTRAMUSCULAR; INTRAVENOUS at 10:11

## 2020-11-09 RX ADMIN — DEXAMETHASONE SODIUM PHOSPHATE 8 MG: 4 INJECTION, SOLUTION INTRAMUSCULAR; INTRAVENOUS at 10:11

## 2020-11-09 RX ADMIN — SODIUM CHLORIDE, SODIUM GLUCONATE, SODIUM ACETATE, POTASSIUM CHLORIDE, MAGNESIUM CHLORIDE, SODIUM PHOSPHATE, DIBASIC, AND POTASSIUM PHOSPHATE: .53; .5; .37; .037; .03; .012; .00082 INJECTION, SOLUTION INTRAVENOUS at 10:11

## 2020-11-09 NOTE — ANESTHESIA PREPROCEDURE EVALUATION
11/09/2020  Jarrett Charlton Jr. is a 70 y.o., male with ileostomy dysfunction History of ulcerative colitis s/p total proctocolectomy with end ileostomy (1985)  He has required revisions of the ileostomy and ultimately it was transposed to the right side due to parastomal and incisional hernias.  He has had intermittent problems with syncope due to dehydration from the high output ostomy and takes imodium and Pedialyte as needed as well as daily Citrucel.  History also includes diabetes (on metformin) gout, CKD II and HTN.  He had a TURP in 1/2019 and is under surveillance with regular PSAs.    Anesthesia Evaluation    I have reviewed the Patient Summary Reports.    I have reviewed the Nursing Notes. I have reviewed the NPO Status.   I have reviewed the Medications.     Review of Systems  Anesthesia Hx:  History of prior surgery of interest to airway management or planning: cervical fusion. Previous anesthesia: General Denies Family Hx of Anesthesia complications.   Denies Personal Hx of Anesthesia complications.   Social:  Non-Smoker    Hematology/Oncology:  Hematology Normal      Oncology Comments: HX SCC    EENT/Dental:EENT/Dental Normal   Cardiovascular:   Hypertension CAD  CABG/stent Dysrhythmias   Denies Angina. hyperlipidemia ECG has been reviewed.  Functional Capacity good / => 4 METS  Carotoid Artery Disease  Hypertension , Well Controlled on Rx, Rx Recently Decreased  Disorder of Cardiac Rhythm (HX of PVCs)    Pulmonary:  Pulmonary Normal  Denies Shortness of breath.  Denies Recent URI.    Renal/:   Chronic Renal Disease Elevated PSA, bladder outflow obstruction Kidney Function/Disease, Chronic Kidney Disease (CKD)    Hepatic/GI:  Esophageal / Stomach Disorders Gerd Controlled by chronic antireflux medication.  Bowel Conditions:  Inflammatory Bowel Disease, Ulcerative Colitis (ileostomy 1985)     Musculoskeletal:   Arthritis     Neurological:   Neuromuscular Disease,    Endocrine:   Diabetes  Diabetes, Type 2 Diabetes , controlled by oral hypoglycemics. Typical AM glucose range: 124 , most recent HgA1c value was 6.6 on 1/7/19.    Psych:  Psychiatric Normal           Physical Exam  General:  Well nourished    Airway/Jaw/Neck:  Airway Findings: Mouth Opening: Normal Tongue: Normal  General Airway Assessment: Adult  Mallampati: II  TM Distance: Normal, at least 6 cm  Jaw/Neck Findings:  Micrognathia: Negative Neck ROM: Normal ROM      Dental:  Dental Findings:   Chest/Lungs:  Chest/Lungs Findings: Clear to auscultation, Normal Respiratory Rate     Heart/Vascular:  Heart Findings: Rate: Normal  Rhythm: Regular Rhythm  Sounds: Normal  Heart murmur: negative    Abdomen:  Abdomen Findings:  Normal, Nontender, Soft       Mental Status:  Mental Status Findings:  Cooperative, Alert and Oriented         Anesthesia Plan  Type of Anesthesia, risks & benefits discussed:  Anesthesia Type:  general  Patient's Preference:   Intra-op Monitoring Plan: standard ASA monitors  Intra-op Monitoring Plan Comments:   Post Op Pain Control Plan: multimodal analgesia, IV/PO Opioids PRN and per primary service following discharge from PACU  Post Op Pain Control Plan Comments:   Induction:   IV  Beta Blocker:  Patient is on a Beta-Blocker and has received one dose within the past 24 hours (No further documentation required).       Informed Consent: Patient understands risks and agrees with Anesthesia plan.  Questions answered. Anesthesia consent signed with patient.  ASA Score: 3     Day of Surgery Review of History & Physical:    H&P update referred to the surgeon.         Ready For Surgery From Anesthesia Perspective.

## 2020-11-09 NOTE — OP NOTE
PEGGY BELLA JR.  [id number]  [id number]    OPERATIVE NOTE:    DATE OF OPERATION: 11/09/2020    PREOPERATIVE DIAGNOSIS:  Recurrent small-bowel obstruction, parastomal hernia    POSTOPERATIVE DIAGNOSIS:  Recurrent small-bowel obstruction, parastomal hernia    PROCEDURE PERFORMED:   1.  Open parastomal hernia repair with mesh underlay with phasix ST mesh in Sugar Spencer fashion  2.  Extensive lysis of adhesions greater than 3 hr    ATTENDING SURGEON: CED Haley MD    RESIDENT/FELLOW SURGEON: Dieter Hutchins MD    ANESTHESIA:  General    ESTIMATED BLOOD LOSS:  100 mL    FINDINGS:  1.  Significant intra-abdominal adhesions up to the anterior abdominal wall as well multiple interloop adhesions.  Dense adhesions down into the pelvis.  Three serosal tears noted and repaired immediately.  2.  Lee catheter initially insufflated into the prostate.  This was reinserted successfully into the bladder.  3.  Small lateral peristomal hernia.  Defect was reapproximated with a 0 figure-of-eight PDS suture.  A 15 cm x 20 cm piece of Phasix ST mesh was placed in [place] fashion    SPECIMENS:  None    COMPLICATIONS:  None apparent    DISPOSITION: PACU    CONDITION: good    INDICATION FOR PROCEDURE:  Peggy Bella Jr. is a 70 y.o. male who previously undergone a total proctocolectomy with end ileostomy.  He had since had multiple ileostomy revisions.  He had developed an incarcerated parastomal hernia the road spontaneously reduced.  He had multiple admissions for small-bowel obstructions.  He had also had multiple admissions for high-output ileostomy.  Lysis of adhesions was discussed with him in detail.  He wished to proceed with lysis of adhesions to remove scar tissue resulting in his bowel obstructions.    DESCRIPTION OF PROCEDURE:   After informed consent was reviewed, the patient was taken to the operating room and transferred to the OR table.  he was placed under general anesthesia.  A lee catheter was sterilely  inserted.  Ancef and flagyl were given for preoperative antibiotics.  He was placed in the supine position.  His anterior abdominal wall was prepped and draped in the usual sterile fashion and a time-out was performed.  His prior midline laparotomy scar was excised sharply.  The subcutaneous fat was incised with electrocautery down to the level of the fascia.  The fascia was incised sharply.  There are multiple adhesions up to the anterior abdominal wall.  These were taken down with a 15 blade.  Tedious lysis of adhesions then ensued for the next 3 hr.  There are multiple adhesions up to the anterior abdominal wall that were taken down.  There were multiple interloop adhesions.  His prior incision was opened.  Sharp dissection was used to lyse all adhesions out to the abdominal sidewall.  We then focused on the pelvis to free the pelvic adhesions.  There were 2 loops of small bowel that were adherent down to the posterior wall of the bladder.  At this point time, we noted that the bladder was full of urine.  The Little had been insufflated in the prostate.  That Little was removed.  A new Little with using a coude catheter was sterilely placed into the bladder and insufflated.  With this, return of urine was seen.  Adhesions were lysed out of the pelvis.  There was the 2 cm serosal tear that was immediately recognized and repaired with 4 interrupted 3 0 Vicryl sutures.  Interloop adhesions were then all divided.  There was well over 300 cm of intestine going from the ileostomy back to the ligament of Treitz.  Once all adhesions were lysed, the small bowel was rerun.  Two small serosal defects were seen and repaired.  There was a small parastomal hernia defect.  This was reapproximated with a 0 PDS figure-of-eight suture to close down the defect.  A 15 x 20 cm piece of Phasix ST mesh was then brought onto the field and hydrated.  This was placed lateral to the stoma by approximately 5 cm with interrupted 0 PDS sutures  in parachute fashion.  This was then secured laterally to the anterior abdominal wall PDS.  Six sheets of Seprafilm were then placed throughout the abdomen.  Mesh was then placed on the contralateral side of the abdomen and secured to the underside of the anterior abdominal wall.  Exparel was injected for local pain relief.  Midline incision was then closed with a #1 PDS suture with interrupted #1 Vicryl internal retention sutures.  Deep dermal sutures with 3 0 Vicryl placed.  The skin was closed with a running 4 O Vicryl in subcuticular fashion.  A Provena incisional wound VAC was then placed.  The patient tolerated the procedure well.  There were no apparent complications.  All sponge, needle, and instruments counts were correct x 2.  he was then extubated and taken to PACU in stable condition.        CED Haley MD, FACS, FASCRS  Staff Surgeon  Colon & Rectal Surgery

## 2020-11-09 NOTE — BRIEF OP NOTE
[facility]  Surgery Department  Brief Op Note    SUMMARY     Date of Procedure: 11/9/2020     Procedure: Procedure(s) (LRB):  REPAIR, HERNIA, PARASTOMAL (N/A)  LYSIS, ADHESIONS,  ERAS low (N/A)     Surgeon(s) and Role:     * CED Haley MD - Primary     * Dieter Hutchins MD - Resident - Assisting        Pre-Operative Diagnosis: Parastomal hernia with obstruction and without gangrene [K43.3]  Intestinal adhesions with partial obstruction [K56.51]    Post-Operative Diagnosis: Post-Op Diagnosis Codes:     * Parastomal hernia with obstruction and without gangrene [K43.3]     * Intestinal adhesions with partial obstruction [K56.51]    Anesthesia: General    Technical Procedures Used: Significant lysis of adhesions.  Parastomal hernia repair, sugarbaker technique     Description of the Findings of the Procedure: Significant interloop and abdominal wall adhesions from small bowel, >2 hrs of adhesiolysis     Significant Surgical Tasks Conducted by the Assistant(s), if Applicable: N/a    Complications: No    Estimated Blood Loss (EBL): 50mL           Implants:   Implant Name Type Inv. Item Serial No.  Lot No. LRB No. Used Action   MESH PHASIX ST RECT [id number] - [id number]  MESH PHASIX ST RECT [id number]  C.[employer]. BARD [id number] N/A 1 Implanted   BARRIER SEPRAFILM ADHESION - [id number]  BARRIER SEPRAFILM ADHESION  GENZYME ERIKA [id number] N/A 1 Implanted       Specimens:   Specimen (12h ago, onward)    None                  Condition: Good    Disposition: PACU - hemodynamically stable.    Attestation: I was present and scrubbed for the entire procedure.

## 2020-11-09 NOTE — TRANSFER OF CARE
"Anesthesia Transfer of Care Note    Patient: Jarrett Charlton Jr.    Procedure(s) Performed: Procedure(s) (LRB):  REPAIR, HERNIA, PARASTOMAL (N/A)  LYSIS, ADHESIONS,  ERAS low (N/A)    Anesthesia PACU Handoff    Last vitals:   Visit Vitals  BP (!) 160/86 (BP Location: Right arm, Patient Position: Lying)   Pulse 75   Temp 36.5 °C (97.7 °F) (Oral)   Resp 18   Ht 6' 3" (1.905 m)   Wt 95.3 kg (210 lb)   SpO2 96%   BMI 26.25 kg/m²     "

## 2020-11-09 NOTE — ANESTHESIA PROCEDURE NOTES
Intubation  Performed by: Rivas Mathis CRNA  Authorized by: Rivas aMthis CRNA     Intubation:     Induction:  Intravenous    Intubated:  Postinduction    Mask Ventilation:  Easy with oral airway    Attempts:  1    Attempted By:  CRNA    Method of Intubation:  Direct    Blade:  Danae 4    Laryngeal View Grade: Grade IIA - cords partially seen      Difficult Airway Encountered?: No      Complications:  None    Airway Device:  Oral endotracheal tube    Airway Device Size:  7.5    Style/Cuff Inflation:  Cuffed    Inflation Amount (mL):  5    Tube secured:  22    Secured at:  The lips    Placement Verified By:  Capnometry    Complicating Factors:  None    Findings Post-Intubation:  BS equal bilateral

## 2020-11-10 LAB
ANION GAP SERPL CALC-SCNC: 9 MMOL/L (ref 8–16)
BASOPHILS # BLD AUTO: 0.01 K/UL (ref 0–0.2)
BASOPHILS # BLD AUTO: 0.01 K/UL (ref 0–0.2)
BASOPHILS NFR BLD: 0.1 % (ref 0–1.9)
BASOPHILS NFR BLD: 0.1 % (ref 0–1.9)
BUN SERPL-MCNC: 29 MG/DL (ref 8–23)
CALCIUM SERPL-MCNC: 8.3 MG/DL (ref 8.7–10.5)
CHLORIDE SERPL-SCNC: 107 MMOL/L (ref 95–110)
CO2 SERPL-SCNC: 24 MMOL/L (ref 23–29)
CREAT SERPL-MCNC: 1.3 MG/DL (ref 0.5–1.4)
DIFFERENTIAL METHOD: ABNORMAL
DIFFERENTIAL METHOD: ABNORMAL
EOSINOPHIL # BLD AUTO: 0 K/UL (ref 0–0.5)
EOSINOPHIL # BLD AUTO: 0 K/UL (ref 0–0.5)
EOSINOPHIL NFR BLD: 0 % (ref 0–8)
EOSINOPHIL NFR BLD: 0 % (ref 0–8)
ERYTHROCYTE [DISTWIDTH] IN BLOOD BY AUTOMATED COUNT: 14.2 % (ref 11.5–14.5)
ERYTHROCYTE [DISTWIDTH] IN BLOOD BY AUTOMATED COUNT: 14.4 % (ref 11.5–14.5)
EST. GFR  (AFRICAN AMERICAN): >60 ML/MIN/1.73 M^2
EST. GFR  (NON AFRICAN AMERICAN): 54.9 ML/MIN/1.73 M^2
GLUCOSE SERPL-MCNC: 197 MG/DL (ref 70–110)
HCT VFR BLD AUTO: 37.9 % (ref 40–54)
HCT VFR BLD AUTO: 38.6 % (ref 40–54)
HGB BLD-MCNC: 11.6 G/DL (ref 14–18)
HGB BLD-MCNC: 11.9 G/DL (ref 14–18)
IMM GRANULOCYTES # BLD AUTO: 0.04 K/UL (ref 0–0.04)
IMM GRANULOCYTES # BLD AUTO: 0.04 K/UL (ref 0–0.04)
IMM GRANULOCYTES NFR BLD AUTO: 0.4 % (ref 0–0.5)
IMM GRANULOCYTES NFR BLD AUTO: 0.4 % (ref 0–0.5)
LYMPHOCYTES # BLD AUTO: 0.8 K/UL (ref 1–4.8)
LYMPHOCYTES # BLD AUTO: 1.2 K/UL (ref 1–4.8)
LYMPHOCYTES NFR BLD: 12.1 % (ref 18–48)
LYMPHOCYTES NFR BLD: 7.9 % (ref 18–48)
MCH RBC QN AUTO: 27.7 PG (ref 27–31)
MCH RBC QN AUTO: 27.9 PG (ref 27–31)
MCHC RBC AUTO-ENTMCNC: 30.6 G/DL (ref 32–36)
MCHC RBC AUTO-ENTMCNC: 30.8 G/DL (ref 32–36)
MCV RBC AUTO: 91 FL (ref 82–98)
MCV RBC AUTO: 91 FL (ref 82–98)
MONOCYTES # BLD AUTO: 0.6 K/UL (ref 0.3–1)
MONOCYTES # BLD AUTO: 0.6 K/UL (ref 0.3–1)
MONOCYTES NFR BLD: 6.4 % (ref 4–15)
MONOCYTES NFR BLD: 6.4 % (ref 4–15)
NEUTROPHILS # BLD AUTO: 7.7 K/UL (ref 1.8–7.7)
NEUTROPHILS # BLD AUTO: 8.2 K/UL (ref 1.8–7.7)
NEUTROPHILS NFR BLD: 81 % (ref 38–73)
NEUTROPHILS NFR BLD: 85.2 % (ref 38–73)
NRBC BLD-RTO: 0 /100 WBC
NRBC BLD-RTO: 0 /100 WBC
PLATELET # BLD AUTO: 166 K/UL (ref 150–350)
PLATELET # BLD AUTO: 166 K/UL (ref 150–350)
PMV BLD AUTO: 10.9 FL (ref 9.2–12.9)
PMV BLD AUTO: 9.8 FL (ref 9.2–12.9)
POCT GLUCOSE: 135 MG/DL (ref 70–110)
POCT GLUCOSE: 136 MG/DL (ref 70–110)
POCT GLUCOSE: 137 MG/DL (ref 70–110)
POCT GLUCOSE: 161 MG/DL (ref 70–110)
POTASSIUM SERPL-SCNC: 4.6 MMOL/L (ref 3.5–5.1)
RBC # BLD AUTO: 4.19 M/UL (ref 4.6–6.2)
RBC # BLD AUTO: 4.26 M/UL (ref 4.6–6.2)
SODIUM SERPL-SCNC: 140 MMOL/L (ref 136–145)
WBC # BLD AUTO: 9.5 K/UL (ref 3.9–12.7)
WBC # BLD AUTO: 9.6 K/UL (ref 3.9–12.7)

## 2020-11-10 PROCEDURE — 97162 PT EVAL MOD COMPLEX 30 MIN: CPT

## 2020-11-10 PROCEDURE — 25000003 PHARM REV CODE 250: Performed by: STUDENT IN AN ORGANIZED HEALTH CARE EDUCATION/TRAINING PROGRAM

## 2020-11-10 PROCEDURE — 63600175 PHARM REV CODE 636 W HCPCS: Performed by: STUDENT IN AN ORGANIZED HEALTH CARE EDUCATION/TRAINING PROGRAM

## 2020-11-10 PROCEDURE — 20600001 HC STEP DOWN PRIVATE ROOM

## 2020-11-10 PROCEDURE — 80048 BASIC METABOLIC PNL TOTAL CA: CPT

## 2020-11-10 PROCEDURE — 94799 UNLISTED PULMONARY SVC/PX: CPT

## 2020-11-10 PROCEDURE — 85025 COMPLETE CBC W/AUTO DIFF WBC: CPT | Mod: 91

## 2020-11-10 PROCEDURE — 97165 OT EVAL LOW COMPLEX 30 MIN: CPT

## 2020-11-10 PROCEDURE — 97530 THERAPEUTIC ACTIVITIES: CPT

## 2020-11-10 PROCEDURE — 36415 COLL VENOUS BLD VENIPUNCTURE: CPT

## 2020-11-10 PROCEDURE — 97535 SELF CARE MNGMENT TRAINING: CPT

## 2020-11-10 PROCEDURE — 94761 N-INVAS EAR/PLS OXIMETRY MLT: CPT

## 2020-11-10 RX ORDER — OXYCODONE HYDROCHLORIDE 5 MG/1
5 TABLET ORAL EVERY 4 HOURS PRN
Status: DISCONTINUED | OUTPATIENT
Start: 2020-11-10 | End: 2020-11-12 | Stop reason: HOSPADM

## 2020-11-10 RX ORDER — HYDROMORPHONE HYDROCHLORIDE 1 MG/ML
0.5 INJECTION, SOLUTION INTRAMUSCULAR; INTRAVENOUS; SUBCUTANEOUS
Status: DISCONTINUED | OUTPATIENT
Start: 2020-11-10 | End: 2020-11-10

## 2020-11-10 RX ORDER — HYDROMORPHONE HCL IN 0.9% NACL 6 MG/30 ML
PATIENT CONTROLLED ANALGESIA SYRINGE INTRAVENOUS CONTINUOUS
Status: DISCONTINUED | OUTPATIENT
Start: 2020-11-10 | End: 2020-11-10

## 2020-11-10 RX ORDER — NALOXONE HCL 0.4 MG/ML
0.02 VIAL (ML) INJECTION
Status: DISCONTINUED | OUTPATIENT
Start: 2020-11-10 | End: 2020-11-10

## 2020-11-10 RX ORDER — HYDROMORPHONE HYDROCHLORIDE 1 MG/ML
0.5 INJECTION, SOLUTION INTRAMUSCULAR; INTRAVENOUS; SUBCUTANEOUS EVERY 4 HOURS PRN
Status: DISCONTINUED | OUTPATIENT
Start: 2020-11-10 | End: 2020-11-12 | Stop reason: HOSPADM

## 2020-11-10 RX ADMIN — Medication: at 10:11

## 2020-11-10 RX ADMIN — GABAPENTIN 600 MG: 300 CAPSULE ORAL at 08:11

## 2020-11-10 RX ADMIN — IBUPROFEN 800 MG: 400 TABLET, FILM COATED ORAL at 10:11

## 2020-11-10 RX ADMIN — OXYCODONE HYDROCHLORIDE 5 MG: 5 TABLET ORAL at 06:11

## 2020-11-10 RX ADMIN — TAMSULOSIN HYDROCHLORIDE 0.4 MG: 0.4 CAPSULE ORAL at 08:11

## 2020-11-10 RX ADMIN — CARVEDILOL 12.5 MG: 12.5 TABLET, FILM COATED ORAL at 08:11

## 2020-11-10 RX ADMIN — ACETAMINOPHEN 1000 MG: 10 INJECTION, SOLUTION INTRAVENOUS at 06:11

## 2020-11-10 RX ADMIN — MUPIROCIN: 20 OINTMENT TOPICAL at 10:11

## 2020-11-10 RX ADMIN — SODIUM CHLORIDE: 0.9 INJECTION, SOLUTION INTRAVENOUS at 02:11

## 2020-11-10 RX ADMIN — ACETAMINOPHEN 1000 MG: 10 INJECTION, SOLUTION INTRAVENOUS at 01:11

## 2020-11-10 RX ADMIN — IBUPROFEN 800 MG: 800 INJECTION INTRAVENOUS at 05:11

## 2020-11-10 RX ADMIN — HYDROMORPHONE HYDROCHLORIDE 1 MG: 1 INJECTION, SOLUTION INTRAMUSCULAR; INTRAVENOUS; SUBCUTANEOUS at 04:11

## 2020-11-10 RX ADMIN — AMITRIPTYLINE HYDROCHLORIDE 50 MG: 25 TABLET, FILM COATED ORAL at 08:11

## 2020-11-10 RX ADMIN — SODIUM CHLORIDE 1000 ML: 0.9 INJECTION, SOLUTION INTRAVENOUS at 02:11

## 2020-11-10 RX ADMIN — ACETAMINOPHEN 1000 MG: 500 TABLET ORAL at 10:11

## 2020-11-10 RX ADMIN — IBUPROFEN 800 MG: 800 INJECTION INTRAVENOUS at 02:11

## 2020-11-10 RX ADMIN — SODIUM CHLORIDE: 0.9 INJECTION, SOLUTION INTRAVENOUS at 08:11

## 2020-11-10 RX ADMIN — ENOXAPARIN SODIUM 40 MG: 40 INJECTION SUBCUTANEOUS at 04:11

## 2020-11-10 RX ADMIN — AMLODIPINE BESYLATE 5 MG: 5 TABLET ORAL at 08:11

## 2020-11-10 NOTE — PT/OT/SLP EVAL
Physical Therapy Evaluation    Patient Name:  Jarrett Charlton Jr.   MRN:  [id number]    Recommendations:     Discharge Recommendations:  home with home health   Discharge Equipment Recommendations: none   Barriers to discharge: decreased functional mobility    Assessment:     Jarrett Charlton Jr. is a 70 y.o. male admitted with a medical diagnosis of Ileostomy in place.  He presents with the following impairments/functional limitations:  impaired endurance, impaired functional mobilty, gait instability, impaired balance, decreased safety awareness, pain.  Tolerated session with complaints of abdominal pain. Patient able to ambulate short household distances with CGA. Demo decreased speed, decreased step length and mild unsteadiness with gait. Pt safe to ambulate assist x 1. Patient would benefit from skilled acute PT 3 x/week to improve functional mobility. Recommending pt receives PT services in HH setting following d/c from hospital once medically cleared.       Rehab Prognosis: Good; patient would benefit from acute skilled PT services to address these deficits and reach maximum level of function.    Recent Surgery: Procedure(s) (LRB):  REPAIR, HERNIA, PARASTOMAL (N/A)  LYSIS, ADHESIONS,  ERAS low (N/A) 1 Day Post-Op    Plan:     During this hospitalization, patient to be seen 3 x/week to address the identified rehab impairments via gait training, therapeutic activities, therapeutic exercises, neuromuscular re-education and progress toward the following goals:    · Plan of Care Expires:  12/10/20    Subjective     Chief Complaint: abdominal pain  Pain/Comfort:  · Pain Rating 1: 10/10(abdomen)    Patients cultural, spiritual, Gnosticism conflicts given the current situation: no    Living Environment:  Pt lives alone in trailer home with ramp entrance. Pt states he used to live with his wife but she recently . Pt uses a walk in shower with gb but no seat. Reports driving, is retired and has had no recent falls.    Prior to admission, patients level of function was IND.  Equipment used at home: grab bar.  DME owned (not currently used): rolling walker, wheelchair and hospital bed.  Upon discharge, patient will have assistance from no-one.    Objective:     Communicated with RN and OT prior to session.  Patient found HOB elevated with wound vac, peripheral IV, lee catheter, SCD  upon PT entry to room.    General Precautions: Standard, fall   Orthopedic Precautions:N/A   Braces: N/A     Exams:  · Cognitive Exam:  Patient is oriented to Person, Place, Time and Situation  · Gross Motor Coordination:  WFL  · RLE ROM: WFL  · RLE Strength: WFL  · LLE ROM: WFL  · LLE Strength: WFL    Functional Mobility:  · Bed Mobility:     · Rolling Left:  contact guard assistance  · Scooting: contact guard assistance  · Supine to Sit: contact guard assistance  · Transfers:     · Sit to Stand:  contact guard assistance with HHA x 2  · Bed to Chair: contact guard assistance with  holding onto IV pole with 1 hand  using  Step Transfer  · Gait: 100ft, CGA with B hands holding IV pole for assist, decreased speed, decreased step length and mild unsteadiness   · Balance: standing, CGA    Therapeutic Activities and Exercises:   -Pt educated on: PT role in POC, safety with mobility, benefits of OOB activities, d/c recs and dme.  -sit to stand  -bed to chair transfer for upright sitting  -gait 100ft    AM-PAC 6 CLICK MOBILITY  Total Score:18     Patient left up in chair with all lines intact, call button in reach and RN notified.    GOALS:   Multidisciplinary Problems     Physical Therapy Goals        Problem: Physical Therapy Goal    Goal Priority Disciplines Outcome Goal Variances Interventions   Physical Therapy Goal     PT, PT/OT Ongoing, Progressing     Description: Goals to be met by: 12/10/2020     Patient will increase functional independence with mobility by performin. Supine to sit with Set-up [place]  2. Sit to stand transfer with  Supervision  3. Bed to chair transfer with Supervision using LRAD.  4. Gait  x 150 feet with Supervision using LRAD.                          History:     Past Medical History:   Diagnosis Date    Basal cell carcinoma     BPH (benign prostatic hyperplasia)     s/p TURP    Carotid stenosis     Chronic kidney disease     Claudication     Coronary artery disease     DDD (degenerative disc disease) 10/21/2013    Diabetes mellitus with renal complications     Disc disease, degenerative, cervical     Encounter for blood transfusion     GERD (gastroesophageal reflux disease)     Gout, chronic     History of ulcerative colitis     s/p colectomy and ileostomy    HLD (hyperlipidemia)     HTN (hypertension)     Ileostomy in place 1982    RBBB     Squamous cell carcinoma 03/08/2018    Left superior helix near insertion    Squamous cell carcinoma 04/12/2018    Left forearm x 5    Ventricular tachycardia        Past Surgical History:   Procedure Laterality Date    cardiac stents      CATARACT EXTRACTION Bilateral     CERVICAL FUSION      colectomy and ileostomy  1985    TRANSURETHRAL RESECTION OF PROSTATE (TURP) WITHOUT USE OF LASER N/A 1/23/2019    Procedure: TURP, WITHOUT USING LASER BIPOLAR;  Surgeon: Catarino Mota MD;  Location: [facility] OR [address] [place] [address];  Service: Urology;  Laterality: N/A;  1.5 HOURS       Time Tracking:     PT Received On: 11/10/20  PT Start Time: 1110     PT Stop Time: 1134  PT Total Time (min): 24 min     Billable Minutes: Evaluation 10 min and Therapeutic Activity 14 min      MARYJANE Mata  11/10/2020

## 2020-11-10 NOTE — NURSING
Received a call from Telemetry patient had a 30 beat run of v-tach.  Notified surgery intern Joaquin. No new orders noted.

## 2020-11-10 NOTE — ANESTHESIA POSTPROCEDURE EVALUATION
Anesthesia Post Evaluation    Patient: Jarrett Charlton Jr.    Procedure(s) Performed: Procedure(s) (LRB):  REPAIR, HERNIA, PARASTOMAL (N/A)  LYSIS, ADHESIONS,  ERAS low (N/A)    Final Anesthesia Type: general    Patient location during evaluation: PACU  Patient participation: Yes- Able to Participate  Level of consciousness: awake and alert and awake  Post-procedure vital signs: reviewed and stable  Pain management: adequate  Airway patency: patent    PONV status at discharge: No PONV  Anesthetic complications: no      Cardiovascular status: blood pressure returned to baseline, stable and hemodynamically stable  Respiratory status: unassisted, spontaneous ventilation and face mask  Hydration status: euvolemic  Follow-up not needed.          Vitals Value Taken Time   /65 11/10/20 1512   Temp 35.8 °C (96.5 °F) 11/10/20 1217   Pulse 73 11/10/20 1556   Resp 18 11/10/20 1217   SpO2 93 % 11/10/20 1217         Event Time   Out of Recovery 17:15:00         Pain/Jamie Score: Pain Rating Prior to Med Admin: 8 (11/10/2020  1:29 PM)  Pain Rating Post Med Admin: 6 (11/10/2020  1:59 PM)  Jamie Score: 10 (11/9/2020  6:10 PM)

## 2020-11-10 NOTE — PLAN OF CARE
Problem: Occupational Therapy Goal  Goal: Occupational Therapy Goal  Description: Goals to be met by: 11/24/20     Patient will increase functional independence with ADLs by performing:    Feeding with [place].  UE Dressing with [place].  LE Dressing with Supervision.  Grooming while standing at sink with Supervision.  Toileting from toilet with Supervision for hygiene and clothing management.   Toilet transfer to toilet with Supervision.    Outcome: Ongoing, Progressing     Evaluation complete-see note for details. Goals and POC established.    PARAS Pate  11/10/2020   Pager #: [phone]

## 2020-11-10 NOTE — ASSESSMENT & PLAN NOTE
Jarrett Charlton is a 70yoM with ileostomy in place who presented for elective parastomal hernia repair with mesh underlay and extensive lysis of adhesions on 11/9/20.     - POD1  - patient complaining of poorly controlled abdominal pain  - patient with 30 beat run of V tach this morning; resolved spontaneously  - awaiting results of morning BMP for possible etiology  - PCA pump ordered for pain control  - patient to remain NPO  - lee catheter to remain in place  - f/u morning labs  - ileostomy without return of function  - continue mIVFs  - PT/OT eval and treat  - lovenox for DVT ppx  - encourage OOBTC, ambulation, IS use

## 2020-11-10 NOTE — PLAN OF CARE
Plan of care reviewed with pt, verbalizes understanding.Vital signs improved with normal saline bolus. Pt AAOX4, respirations even and unlabored on room air. Pt up to chair majority of the day. Voiding via lee catheter, ileostomy has small amount of output. Midline incision with wound vac in place, no complications noted. Pt ambulatory with 1 person assist. Fall and safety measures maintained this  shift. Tele monitoring maintained this shift. Advised to call staff for assistance. Siderails up x2, bed locked and low, call bell and personal items within reach, will continue to monitor.

## 2020-11-10 NOTE — PLAN OF CARE
Problem: Physical Therapy Goal  Goal: Physical Therapy Goal  Description: Goals to be met by: 12/10/2020     Patient will increase functional independence with mobility by performin. Supine to sit with Set-up [place]  2. Sit to stand transfer with Supervision  3. Bed to chair transfer with Supervision using LRAD.  4. Gait  x 150 feet with Supervision using LRAD.         Outcome: Ongoing, Progressing       Patient evaluated and treated 11/10/2020      MARYJANE Mata  11/10/2020

## 2020-11-10 NOTE — PT/OT/SLP EVAL
"Occupational Therapy  Co-Evaluation and Treatment    Name: Jarrett Charlton Jr.  MRN: [id number]  Admitting Diagnosis:  Ileostomy in place 1 Day Post-Op    Recommendations:     Discharge Recommendations: home health OT  Discharge Equipment Recommendations:  none  Barriers to discharge:  None    Assessment:     Jarrett Charlton Jr. is a 70 y.o. male with a medical diagnosis of Ileostomy in place.  He presents s/p hernia repair 11/9/20. Pt tolerated session well, despite abdominal pain. Pt with 10/10 pain and frequently utilizing PCA pump throughout session. Pt required CGA for mobility and transfers. Pt required frequent cues for safety and demonstrated impulsivity. Performance deficits affecting function: impaired endurance, weakness, impaired self care skills, impaired functional mobilty, gait instability, decreased safety awareness, pain.  Pt would benefit from skilled OT services in order to maximize independence with ADLs and facilitate safe discharge. Pt would benefit from home health OT once medically stable for discharge.     Rehab Prognosis: Good; patient would benefit from acute skilled OT services to address these deficits and reach maximum level of function.       Plan:     Patient to be seen 3 x/week to address the above listed problems via self-care/home management, therapeutic activities, therapeutic exercises  · Plan of Care Expires: 12/10/20  · Plan of Care Reviewed with: patient    Subjective     Chief Complaint: "the chipmunks are here"  Patient/Family Comments/goals: to return home    Occupational Profile:  Living Environment: Pt lives alone in a mobile home with ramp entry. Pt has a walk-in shower with grab bars and built in seat  Previous level of function: independent, drives, reports no falls  Roles and Routines: retired , , father  Equipment Used at Home:  grab bar  Assistance upon Discharge: Upon discharge, pt will have assistance from family    Pain/Comfort:  · Pain Rating 1: " 10/10  · Location 1: abdomen  · Pain Rating Post-Intervention 1: 10/10    Patients cultural, spiritual, Mu-ism conflicts given the current situation: no    Objective:     Communicated with: RN and PT prior to session.  Patient found HOB elevated with wound vac, peripheral IV, lee catheter, SCD upon OT entry to room.    General Precautions: Standard, fall   Orthopedic Precautions:N/A   Braces: N/A     Occupational Performance:    Bed Mobility:    · Patient completed Scooting/Bridging with contact guard assistance  · Patient completed Supine to Sit with contact guard assistance   · HOB elevated  · Pt with c/o of dizziness sitting EOB following supine to sit    Functional Mobility/Transfers:  · Patient completed Sit <> Stand Transfer with contact guard assistance  with  no assistive device   · Functional Mobility: Pt ambulated household distance with CGA and no AD in order to maximize functional activity tolerance and standing balance required for engagement in occupations of choice. No LOB, SOB, or c/o of dizziness.    Activities of Daily Living:  · Lower Body Dressing: maximal assistance to don B socks    Cognitive/Visual Perceptual:  Cognitive/Psychosocial Skills:     -       Oriented to: Person, Place, Time and Situation   -       Follows Commands/attention:Follows one-step commands  -       Communication: clear/fluent  -       Memory: No Deficits noted  -       Safety awareness/insight to disability: impaired   -       Mood/Affect/Coping skills/emotional control: Appropriate to situation    Physical Exam:  Upper Extremity Range of Motion:     -       Right Upper Extremity: WFL  -       Left Upper Extremity: WFL  Upper Extremity Strength:    -       Right Upper Extremity: WFL  -       Left Upper Extremity: WFL   Strength:    -       Right Upper Extremity: WFL  -       Left Upper Extremity: WFL    AMPAC 6 Click ADL:  AMPAC Total Score: 20    Treatment & Education:  -Therapist provided facilitation and  instruction of proper body mechanics, energy conservation, and fall prevention strategies during tasks listed above.  -Pt educated on role of OT, POC and goals for therapy  -Pt educated on importance of OOB activities with staff member assistance and sitting OOB majority of the day.   -Pt verbalized understanding. Pt expressed no further concerns/questions  -Whiteboard updated   Education:    Patient left up in chair with all lines intact and call button in reach    GOALS:   Multidisciplinary Problems     Occupational Therapy Goals        Problem: Occupational Therapy Goal    Goal Priority Disciplines Outcome Interventions   Occupational Therapy Goal     OT, PT/OT Ongoing, Progressing    Description: Goals to be met by: 11/24/20     Patient will increase functional independence with ADLs by performing:    Feeding with [place].  UE Dressing with [place].  LE Dressing with Supervision.  Grooming while standing at sink with Supervision.  Toileting from toilet with Supervision for hygiene and clothing management.   Toilet transfer to toilet with Supervision.                     History:     Past Medical History:   Diagnosis Date    Basal cell carcinoma     BPH (benign prostatic hyperplasia)     s/p TURP    Carotid stenosis     Chronic kidney disease     Claudication     Coronary artery disease     DDD (degenerative disc disease) 10/21/2013    Diabetes mellitus with renal complications     Disc disease, degenerative, cervical     Encounter for blood transfusion     GERD (gastroesophageal reflux disease)     Gout, chronic     History of ulcerative colitis     s/p colectomy and ileostomy    HLD (hyperlipidemia)     HTN (hypertension)     Ileostomy in place 1982    RBBB     Squamous cell carcinoma 03/08/2018    Left superior helix near insertion    Squamous cell carcinoma 04/12/2018    Left forearm x 5    Ventricular tachycardia        Past Surgical History:   Procedure Laterality Date     cardiac stents      CATARACT EXTRACTION Bilateral     CERVICAL FUSION      colectomy and ileostomy  1985    TRANSURETHRAL RESECTION OF PROSTATE (TURP) WITHOUT USE OF LASER N/A 1/23/2019    Procedure: TURP, WITHOUT USING LASER BIPOLAR;  Surgeon: Catarino Mota MD;  Location: [facility] OR [address] [place] [address];  Service: Urology;  Laterality: N/A;  1.5 HOURS       Time Tracking:     OT Date of Treatment: 11/10/20  OT Start Time: 1110  OT Stop Time: 1133  OT Total Time (min): 23 min    Billable Minutes:Evaluation 15  Self Care/Home Management 8    Amy Mckinnon OT  11/10/2020

## 2020-11-10 NOTE — PROGRESS NOTES
[facility]  Colorectal Surgery  Progress Note    Patient Name: Jarrett Charlton Jr.  MRN: [id number]  Admission Date: 11/9/2020  Hospital Length of Stay: 1 days  Attending Physician: CED Haley MD    Subjective:     Interval History: Patient seen and examined this morning. POD1. 30 beat run of V Tach this morning. Resolved spontaneously. Awaiting BMP results. Complaining of poor pain control. Plan for PCA pump for adequate pain control. No new complaints at this time.     Post-Op Info:  Procedure(s) (LRB):  REPAIR, HERNIA, PARASTOMAL (N/A)  LYSIS, ADHESIONS,  ERAS low (N/A)   1 Day Post-Op      Medications:  Continuous Infusions:   sodium chloride 0.9% 125 mL/hr at 11/09/20 2347    hydromorphone in 0.9 % NaCl 6 mg/30 ml       Scheduled Meds:   acetaminophen  1,000 mg Intravenous Q8H    Followed by    acetaminophen  1,000 mg Oral Q8H    amitriptyline  50 mg Oral QHS    amLODIPine  5 mg Oral Daily    bupivacaine liposome (PF)  20 mL Infiltration Once    carvediloL  12.5 mg Oral BID    enoxaparin  40 mg Subcutaneous Q24H    gabapentin  600 mg Oral BID    ibuprofen  800 mg Intravenous Q8H    Followed by    ibuprofen  800 mg Oral Q8H    mupirocin   Nasal BID    tamsulosin  0.4 mg Oral Daily     PRN Meds:   HYDROmorphone    naloxone    ondansetron    sodium chloride 0.9%    traMADoL        Objective:     Vital Signs (Most Recent):  Temp: 98.1 °F (36.7 °C) (11/10/20 0414)  Pulse: 75 (11/10/20 0414)  Resp: 16 (11/10/20 0414)  BP: (!) 155/81 (11/10/20 0414)  SpO2: 95 % (11/10/20 0414) Vital Signs (24h Range):  Temp:  [97.2 °F (36.2 °C)-98.1 °F (36.7 °C)] 98.1 °F (36.7 °C)  Pulse:  [70-96] 75  Resp:  [11-20] 16  SpO2:  [95 %-100 %] 95 %  BP: (122-160)/(70-88) 155/81     Intake/Output - Last 3 Shifts       11/08 0700 - 11/09 0659 11/09 0700 - 11/10 0659 11/10 0700 - 11/11 0659    P.O.  0     I.V. (mL/kg)  1400 (14.7)     Total Intake(mL/kg)  1400 (14.7)     Urine (mL/kg/hr)  1255      Stool  0     Total Output  1255     Net  +145                  Physical Exam  Vitals signs and nursing note reviewed.   Constitutional:       General: He is in acute distress.      Appearance: He is normal weight. He is not ill-appearing or toxic-appearing.   HENT:      Head: Normocephalic and atraumatic.      Nose: Nose normal.      Mouth/Throat:      Mouth: Mucous membranes are moist.   Neck:      Musculoskeletal: Normal range of motion.   Cardiovascular:      Rate and Rhythm: Normal rate and regular rhythm.      Pulses: Normal pulses.   Pulmonary:      Effort: Pulmonary effort is normal. No respiratory distress.   Abdominal:      Comments: Abdomen soft, mildly distended  Tender to palpation throughout abdomen  Midline incision c/d/i  LUQ ileostomy without return of function   Musculoskeletal: Normal range of motion.   Skin:     General: Skin is warm.   Neurological:      General: No focal deficit present.      Mental Status: He is alert.   Psychiatric:         Mood and Affect: Mood normal.       Significant Labs:  BMP (Last 3 Results):   Recent Labs   Lab 11/09/20  1634   CREATININE 1.5*     CBC (Last 3 Results):   Recent Labs   Lab 11/05/20  1430 11/10/20  0555   WBC 7.30 9.60   RBC 4.50* 4.26*   HGB 12.7* 11.9*   HCT 39.1* 38.6*    166   MCV 87 91   MCH 28.2 27.9   MCHC 32.5 30.8*       Significant Diagnostics:  I have reviewed all pertinent imaging results/findings within the past 24 hours.    Assessment/Plan:     Partial small bowel obstruction  Jarrett Charlton is a 70yoM with ileostomy in place who presented for elective parastomal hernia repair with mesh underlay and extensive lysis of adhesions on 11/9/20.     - POD1  - patient complaining of poorly controlled abdominal pain  - patient with 30 beat run of V tach this morning; resolved spontaneously  - awaiting results of morning BMP for possible etiology  - continue telemetry  - PCA pump ordered for pain control  - patient to remain NPO; OK for  sips, ice chips  - lee catheter to remain in place  - f/u morning labs  - ileostomy without return of function  - continue mIVFs  - PT/OT eval and treat  - lovenox for DVT ppx  - encourage OOBTC, ambulation, IS use          Waldemar Nuñez MD  Colorectal Surgery  [facility]

## 2020-11-10 NOTE — PLAN OF CARE
PHARMACY [address] [place] [place]   PCP RIAY CLEMENTE  DAUGHTER/ALYSSA BELLA     CM MET WITH THE PT TO DELIVER THE BLUE FOLDER AND SW CONTACT INFORMATION MN STATES THAT HE LIVES IN A [facility] HOME ALONE AND THAT HIS DAUGHTER WILL PROVIDE HIS RIDE HOME    11/10/20 1124   Discharge Assessment   Assessment Type Discharge Planning Assessment   Confirmed/corrected address and phone number on facesheet? Yes   Assessment information obtained from? Patient   Expected Length of Stay (days) 3   Prior to hospitilization cognitive status: No Deficits   Prior to hospitalization functional status: Independent   Current cognitive status: No Deficits   Current Functional Status: Independent   Lives With child(eladio), adult   Able to Return to Prior Arrangements yes   Is patient able to care for self after discharge? Yes   Patient's perception of discharge disposition home or selfcare   Readmission Within the Last 30 Days no previous admission in last 30 days   Patient currently being followed by outpatient case management? No   Patient currently receives any other outside agency services? No   Equipment Currently Used at Home none   Do you have any problems affording any of your prescribed medications? No   Is the patient taking medications as prescribed? yes   Does the patient have transportation home? Yes   Transportation Anticipated family or friend will provide   Discharge Plan A Home;Home with family;Home Health   Discharge Plan B Home;Home with family;Home Health   DME Needed Upon Discharge  none   Patient/Family in Agreement with Plan unable to assess

## 2020-11-10 NOTE — SUBJECTIVE & OBJECTIVE
Subjective:     Interval History: Patient seen and examined this morning. POD1. 30 beat run of V Tach this morning. Resolved spontaneously. Awaiting BMP results. Complaining of poor pain control. Plan for PCA pump for adequate pain control. No new complaints at this time.     Post-Op Info:  Procedure(s) (LRB):  REPAIR, HERNIA, PARASTOMAL (N/A)  LYSIS, ADHESIONS,  ERAS low (N/A)   1 Day Post-Op      Medications:  Continuous Infusions:   sodium chloride 0.9% 125 mL/hr at 11/09/20 2347    hydromorphone in 0.9 % NaCl 6 mg/30 ml       Scheduled Meds:   acetaminophen  1,000 mg Intravenous Q8H    Followed by    acetaminophen  1,000 mg Oral Q8H    amitriptyline  50 mg Oral QHS    amLODIPine  5 mg Oral Daily    bupivacaine liposome (PF)  20 mL Infiltration Once    carvediloL  12.5 mg Oral BID    enoxaparin  40 mg Subcutaneous Q24H    gabapentin  600 mg Oral BID    ibuprofen  800 mg Intravenous Q8H    Followed by    ibuprofen  800 mg Oral Q8H    mupirocin   Nasal BID    tamsulosin  0.4 mg Oral Daily     PRN Meds:   HYDROmorphone    naloxone    ondansetron    sodium chloride 0.9%    traMADoL        Objective:     Vital Signs (Most Recent):  Temp: 98.1 °F (36.7 °C) (11/10/20 0414)  Pulse: 75 (11/10/20 0414)  Resp: 16 (11/10/20 0414)  BP: (!) 155/81 (11/10/20 0414)  SpO2: 95 % (11/10/20 0414) Vital Signs (24h Range):  Temp:  [97.2 °F (36.2 °C)-98.1 °F (36.7 °C)] 98.1 °F (36.7 °C)  Pulse:  [70-96] 75  Resp:  [11-20] 16  SpO2:  [95 %-100 %] 95 %  BP: (122-160)/(70-88) 155/81     Intake/Output - Last 3 Shifts       11/08 0700 - 11/09 0659 11/09 0700 - 11/10 0659 11/10 0700 - 11/11 0659    P.O.  0     I.V. (mL/kg)  1400 (14.7)     Total Intake(mL/kg)  1400 (14.7)     Urine (mL/kg/hr)  1255     Stool  0     Total Output  1255     Net  +145                  Physical Exam  Vitals signs and nursing note reviewed.   Constitutional:       General: He is in acute distress.      Appearance: He is normal weight. He is  not ill-appearing or toxic-appearing.   HENT:      Head: Normocephalic and atraumatic.      Nose: Nose normal.      Mouth/Throat:      Mouth: Mucous membranes are moist.   Neck:      Musculoskeletal: Normal range of motion.   Cardiovascular:      Rate and Rhythm: Normal rate and regular rhythm.      Pulses: Normal pulses.   Pulmonary:      Effort: Pulmonary effort is normal. No respiratory distress.   Abdominal:      Comments: Abdomen soft, mildly distended  Tender to palpation throughout abdomen  Midline incision c/d/i  LUQ ileostomy without return of function   Musculoskeletal: Normal range of motion.   Skin:     General: Skin is warm.   Neurological:      General: No focal deficit present.      Mental Status: He is alert.   Psychiatric:         Mood and Affect: Mood normal.       Significant Labs:  BMP (Last 3 Results):   Recent Labs   Lab 11/09/20  1634   CREATININE 1.5*     CBC (Last 3 Results):   Recent Labs   Lab 11/05/20  1430 11/10/20  0555   WBC 7.30 9.60   RBC 4.50* 4.26*   HGB 12.7* 11.9*   HCT 39.1* 38.6*    166   MCV 87 91   MCH 28.2 27.9   MCHC 32.5 30.8*       Significant Diagnostics:  I have reviewed all pertinent imaging results/findings within the past 24 hours.

## 2020-11-11 LAB
ANION GAP SERPL CALC-SCNC: 8 MMOL/L (ref 8–16)
BASOPHILS # BLD AUTO: 0.01 K/UL (ref 0–0.2)
BASOPHILS NFR BLD: 0.1 % (ref 0–1.9)
BUN SERPL-MCNC: 28 MG/DL (ref 8–23)
CALCIUM SERPL-MCNC: 8.3 MG/DL (ref 8.7–10.5)
CHLORIDE SERPL-SCNC: 113 MMOL/L (ref 95–110)
CO2 SERPL-SCNC: 24 MMOL/L (ref 23–29)
CREAT SERPL-MCNC: 1.1 MG/DL (ref 0.5–1.4)
DIFFERENTIAL METHOD: ABNORMAL
EOSINOPHIL # BLD AUTO: 0 K/UL (ref 0–0.5)
EOSINOPHIL NFR BLD: 0.1 % (ref 0–8)
ERYTHROCYTE [DISTWIDTH] IN BLOOD BY AUTOMATED COUNT: 14.4 % (ref 11.5–14.5)
EST. GFR  (AFRICAN AMERICAN): >60 ML/MIN/1.73 M^2
EST. GFR  (NON AFRICAN AMERICAN): >60 ML/MIN/1.73 M^2
GLUCOSE SERPL-MCNC: 109 MG/DL (ref 70–110)
HCT VFR BLD AUTO: 33.1 % (ref 40–54)
HGB BLD-MCNC: 10.1 G/DL (ref 14–18)
IMM GRANULOCYTES # BLD AUTO: 0.02 K/UL (ref 0–0.04)
IMM GRANULOCYTES NFR BLD AUTO: 0.3 % (ref 0–0.5)
LYMPHOCYTES # BLD AUTO: 0.9 K/UL (ref 1–4.8)
LYMPHOCYTES NFR BLD: 12.8 % (ref 18–48)
MCH RBC QN AUTO: 28.1 PG (ref 27–31)
MCHC RBC AUTO-ENTMCNC: 30.5 G/DL (ref 32–36)
MCV RBC AUTO: 92 FL (ref 82–98)
MONOCYTES # BLD AUTO: 0.4 K/UL (ref 0.3–1)
MONOCYTES NFR BLD: 5.4 % (ref 4–15)
NEUTROPHILS # BLD AUTO: 5.9 K/UL (ref 1.8–7.7)
NEUTROPHILS NFR BLD: 81.3 % (ref 38–73)
NRBC BLD-RTO: 0 /100 WBC
PLATELET # BLD AUTO: 126 K/UL (ref 150–350)
PMV BLD AUTO: 11.2 FL (ref 9.2–12.9)
POTASSIUM SERPL-SCNC: 4.2 MMOL/L (ref 3.5–5.1)
RBC # BLD AUTO: 3.6 M/UL (ref 4.6–6.2)
SODIUM SERPL-SCNC: 145 MMOL/L (ref 136–145)
WBC # BLD AUTO: 7.26 K/UL (ref 3.9–12.7)

## 2020-11-11 PROCEDURE — 80048 BASIC METABOLIC PNL TOTAL CA: CPT

## 2020-11-11 PROCEDURE — 85025 COMPLETE CBC W/AUTO DIFF WBC: CPT

## 2020-11-11 PROCEDURE — 25000003 PHARM REV CODE 250: Performed by: STUDENT IN AN ORGANIZED HEALTH CARE EDUCATION/TRAINING PROGRAM

## 2020-11-11 PROCEDURE — 36415 COLL VENOUS BLD VENIPUNCTURE: CPT

## 2020-11-11 PROCEDURE — 20600001 HC STEP DOWN PRIVATE ROOM

## 2020-11-11 PROCEDURE — 63600175 PHARM REV CODE 636 W HCPCS: Performed by: STUDENT IN AN ORGANIZED HEALTH CARE EDUCATION/TRAINING PROGRAM

## 2020-11-11 RX ADMIN — TAMSULOSIN HYDROCHLORIDE 0.4 MG: 0.4 CAPSULE ORAL at 09:11

## 2020-11-11 RX ADMIN — IBUPROFEN 800 MG: 400 TABLET, FILM COATED ORAL at 10:11

## 2020-11-11 RX ADMIN — ENOXAPARIN SODIUM 40 MG: 40 INJECTION SUBCUTANEOUS at 05:11

## 2020-11-11 RX ADMIN — OXYCODONE HYDROCHLORIDE 5 MG: 5 TABLET ORAL at 03:11

## 2020-11-11 RX ADMIN — MUPIROCIN: 20 OINTMENT TOPICAL at 08:11

## 2020-11-11 RX ADMIN — OXYCODONE HYDROCHLORIDE 5 MG: 5 TABLET ORAL at 09:11

## 2020-11-11 RX ADMIN — HYDROMORPHONE HYDROCHLORIDE 0.5 MG: 1 INJECTION, SOLUTION INTRAMUSCULAR; INTRAVENOUS; SUBCUTANEOUS at 05:11

## 2020-11-11 RX ADMIN — ACETAMINOPHEN 1000 MG: 500 TABLET ORAL at 05:11

## 2020-11-11 RX ADMIN — AMITRIPTYLINE HYDROCHLORIDE 50 MG: 25 TABLET, FILM COATED ORAL at 08:11

## 2020-11-11 RX ADMIN — IBUPROFEN 800 MG: 400 TABLET, FILM COATED ORAL at 05:11

## 2020-11-11 RX ADMIN — SODIUM CHLORIDE: 0.9 INJECTION, SOLUTION INTRAVENOUS at 03:11

## 2020-11-11 RX ADMIN — CARVEDILOL 12.5 MG: 12.5 TABLET, FILM COATED ORAL at 09:11

## 2020-11-11 RX ADMIN — AMLODIPINE BESYLATE 5 MG: 5 TABLET ORAL at 09:11

## 2020-11-11 RX ADMIN — GABAPENTIN 600 MG: 300 CAPSULE ORAL at 08:11

## 2020-11-11 RX ADMIN — IBUPROFEN 800 MG: 400 TABLET, FILM COATED ORAL at 03:11

## 2020-11-11 RX ADMIN — OXYCODONE HYDROCHLORIDE 5 MG: 5 TABLET ORAL at 07:11

## 2020-11-11 RX ADMIN — CARVEDILOL 12.5 MG: 12.5 TABLET, FILM COATED ORAL at 08:11

## 2020-11-11 RX ADMIN — ACETAMINOPHEN 1000 MG: 500 TABLET ORAL at 10:11

## 2020-11-11 RX ADMIN — GABAPENTIN 600 MG: 300 CAPSULE ORAL at 09:11

## 2020-11-11 NOTE — PLAN OF CARE
Patient alert and oriented x 4.  Patient resting in bed at this time.  Patient currently has lee catheter post surgical procedure.  Patient has midline incision with wound vac connected. Patient able to voice needs and concerns.

## 2020-11-11 NOTE — PROGRESS NOTES
[facility]  Colorectal Surgery  Progress Note    Patient Name: Jarrett Charlton Jr.  MRN: [id number]  Admission Date: 11/9/2020  Hospital Length of Stay: 2 days  Attending Physician: CED Haley MD    Subjective:     Interval History: Patient seen and examined this morning. Hypotensive yesterday afternoon secondary to PCA pump. Pain well controlled. Increased ostomy function over night. No new complaints at this time.     Post-Op Info:  Procedure(s) (LRB):  REPAIR, HERNIA, PARASTOMAL (N/A)  LYSIS, ADHESIONS,  ERAS low (N/A)   2 Days Post-Op      Medications:  Continuous Infusions:  Scheduled Meds:   acetaminophen  1,000 mg Oral Q8H    amitriptyline  50 mg Oral QHS    amLODIPine  5 mg Oral Daily    bupivacaine liposome (PF)  20 mL Infiltration Once    carvediloL  12.5 mg Oral BID    enoxaparin  40 mg Subcutaneous Q24H    gabapentin  600 mg Oral BID    ibuprofen  800 mg Oral Q8H    mupirocin   Nasal BID    tamsulosin  0.4 mg Oral Daily     PRN Meds:   HYDROmorphone    ondansetron    oxyCODONE    oxyCODONE    sodium chloride 0.9%    traMADoL        Objective:     Vital Signs (Most Recent):  Temp: 96.3 °F (35.7 °C) (11/11/20 0943)  Pulse: 81 (11/11/20 0943)  Resp: 18 (11/11/20 0943)  BP: (!) 154/80 (11/11/20 0943)  SpO2: 95 % (11/11/20 0943) Vital Signs (24h Range):  Temp:  [96 °F (35.6 °C)-98.8 °F (37.1 °C)] 96.3 °F (35.7 °C)  Pulse:  [66-81] 81  Resp:  [16-18] 18  SpO2:  [93 %-99 %] 95 %  BP: ()/(51-80) 154/80     Intake/Output - Last 3 Shifts       11/09 0700 - 11/10 0659 11/10 0700 - 11/11 0659 11/11 0700 - 11/12 0659    P.O. 0 50     I.V. (mL/kg) 1400 (14.7) 3777.1 (39.6)     Total Intake(mL/kg) 1400 (14.7) 3827.1 (40.2)     Urine (mL/kg/hr) 1480 1825 (0.8)     Stool 0 300     Total Output 1480 2125     Net -80 +1702.1            Stool Occurrence  0 x           Physical Exam  Vitals signs and nursing note reviewed.   Constitutional:       General: He is not in acute  distress.     Appearance: He is normal weight. He is not ill-appearing or toxic-appearing.   HENT:      Head: Normocephalic and atraumatic.      Nose: Nose normal.      Mouth/Throat:      Mouth: Mucous membranes are moist.   Neck:      Musculoskeletal: Normal range of motion.   Cardiovascular:      Rate and Rhythm: Normal rate and regular rhythm.      Pulses: Normal pulses.   Pulmonary:      Effort: Pulmonary effort is normal. No respiratory distress.   Abdominal:      Comments: Abdomen soft, mildly distended  Tender to palpation throughout abdomen  Midline incision with provena wound vac; minimal output  RUQ ileostomy without return of function   Musculoskeletal: Normal range of motion.   Skin:     General: Skin is warm.   Neurological:      General: No focal deficit present.      Mental Status: He is alert.   Psychiatric:         Mood and Affect: Mood normal.           Significant Labs:  CBC (Last 3 Results):   Recent Labs   Lab 11/10/20  0555 11/10/20  1511 11/11/20  0402   WBC 9.60 9.50 7.26   RBC 4.26* 4.19* 3.60*   HGB 11.9* 11.6* 10.1*   HCT 38.6* 37.9* 33.1*    166 126*   MCV 91 91 92   MCH 27.9 27.7 28.1   MCHC 30.8* 30.6* 30.5*     CMP (Last 3 Results):   Recent Labs   Lab 11/09/20  1634 11/10/20  0555 11/11/20  0402   GLU  --  197* 109   CALCIUM  --  8.3* 8.3*   NA  --  140 145   K  --  4.6 4.2   CO2  --  24 24   CL  --  107 113*   BUN  --  29* 28*   CREATININE 1.5* 1.3 1.1       Significant Diagnostics:  I have reviewed all pertinent imaging results/findings within the past 24 hours.    Assessment/Plan:     Partial small bowel obstruction  Jarrett Charlton is a 70yoM with ileostomy in place who presented for elective parastomal hernia repair with mesh underlay and extensive lysis of adhesions on 11/9/20.     - POD2  - subjectively, patient doing much better this morning.  - PCA pump discontinued secondary to hypotension  - ostomy functioning appropriately   - advance to low residue diet  - lee  catheter to remain in place; possible void trial tomorrow  - d/c mIVFs  - PT/OT eval and treat    - lovenox for DVT ppx  - encourage OOBTC, ambulation, IS use          Waldemar Nuñez MD  Colorectal Surgery  [facility]

## 2020-11-11 NOTE — ASSESSMENT & PLAN NOTE
Jarrett Charlton is a 70yoM with ileostomy in place who presented for elective parastomal hernia repair with mesh underlay and extensive lysis of adhesions on 11/9/20.     - POD2  - subjectively, patient doing much better this morning.  - PCA pump discontinued secondary to hypotension  - ostomy functioning appropriately   - advance to low residue diet  - lee catheter to remain in place; possible void trial tomorrow  - d/c mIVFs  - PT/OT eval and treat    - lovenox for DVT ppx  - encourage OOBTC, ambulation, IS use

## 2020-11-11 NOTE — SUBJECTIVE & OBJECTIVE
Subjective:     Interval History: Patient seen and examined this morning. Hypotensive yesterday afternoon secondary to PCA pump. Pain well controlled. Increased ostomy function over night. No new complaints at this time.     Post-Op Info:  Procedure(s) (LRB):  REPAIR, HERNIA, PARASTOMAL (N/A)  LYSIS, ADHESIONS,  ERAS low (N/A)   2 Days Post-Op      Medications:  Continuous Infusions:  Scheduled Meds:   acetaminophen  1,000 mg Oral Q8H    amitriptyline  50 mg Oral QHS    amLODIPine  5 mg Oral Daily    bupivacaine liposome (PF)  20 mL Infiltration Once    carvediloL  12.5 mg Oral BID    enoxaparin  40 mg Subcutaneous Q24H    gabapentin  600 mg Oral BID    ibuprofen  800 mg Oral Q8H    mupirocin   Nasal BID    tamsulosin  0.4 mg Oral Daily     PRN Meds:   HYDROmorphone    ondansetron    oxyCODONE    oxyCODONE    sodium chloride 0.9%    traMADoL        Objective:     Vital Signs (Most Recent):  Temp: 96.3 °F (35.7 °C) (11/11/20 0943)  Pulse: 81 (11/11/20 0943)  Resp: 18 (11/11/20 0943)  BP: (!) 154/80 (11/11/20 0943)  SpO2: 95 % (11/11/20 0943) Vital Signs (24h Range):  Temp:  [96 °F (35.6 °C)-98.8 °F (37.1 °C)] 96.3 °F (35.7 °C)  Pulse:  [66-81] 81  Resp:  [16-18] 18  SpO2:  [93 %-99 %] 95 %  BP: ()/(51-80) 154/80     Intake/Output - Last 3 Shifts       11/09 0700 - 11/10 0659 11/10 0700 - 11/11 0659 11/11 0700 - 11/12 0659    P.O. 0 50     I.V. (mL/kg) 1400 (14.7) 3777.1 (39.6)     Total Intake(mL/kg) 1400 (14.7) 3827.1 (40.2)     Urine (mL/kg/hr) 1480 1825 (0.8)     Stool 0 300     Total Output 1480 2125     Net -80 +1702.1            Stool Occurrence  0 x           Physical Exam  Vitals signs and nursing note reviewed.   Constitutional:       General: He is not in acute distress.     Appearance: He is normal weight. He is not ill-appearing or toxic-appearing.   HENT:      Head: Normocephalic and atraumatic.      Nose: Nose normal.      Mouth/Throat:      Mouth: Mucous membranes are moist.    Neck:      Musculoskeletal: Normal range of motion.   Cardiovascular:      Rate and Rhythm: Normal rate and regular rhythm.      Pulses: Normal pulses.   Pulmonary:      Effort: Pulmonary effort is normal. No respiratory distress.   Abdominal:      Comments: Abdomen soft, mildly distended  Tender to palpation throughout abdomen  Midline incision with provena wound vac; minimal output  RUQ ileostomy without return of function   Musculoskeletal: Normal range of motion.   Skin:     General: Skin is warm.   Neurological:      General: No focal deficit present.      Mental Status: He is alert.   Psychiatric:         Mood and Affect: Mood normal.           Significant Labs:  CBC (Last 3 Results):   Recent Labs   Lab 11/10/20  0555 11/10/20  1511 11/11/20  0402   WBC 9.60 9.50 7.26   RBC 4.26* 4.19* 3.60*   HGB 11.9* 11.6* 10.1*   HCT 38.6* 37.9* 33.1*    166 126*   MCV 91 91 92   MCH 27.9 27.7 28.1   MCHC 30.8* 30.6* 30.5*     CMP (Last 3 Results):   Recent Labs   Lab 11/09/20  1634 11/10/20  0555 11/11/20  0402   GLU  --  197* 109   CALCIUM  --  8.3* 8.3*   NA  --  140 145   K  --  4.6 4.2   CO2  --  24 24   CL  --  107 113*   BUN  --  29* 28*   CREATININE 1.5* 1.3 1.1       Significant Diagnostics:  I have reviewed all pertinent imaging results/findings within the past 24 hours.

## 2020-11-12 VITALS
HEART RATE: 79 BPM | BODY MASS INDEX: 26.11 KG/M2 | WEIGHT: 210 LBS | RESPIRATION RATE: 20 BRPM | TEMPERATURE: 98 F | HEIGHT: 75 IN | OXYGEN SATURATION: 97 % | SYSTOLIC BLOOD PRESSURE: 134 MMHG | DIASTOLIC BLOOD PRESSURE: 80 MMHG

## 2020-11-12 PROBLEM — C61 PROSTATE CANCER: Status: RESOLVED | Noted: 2020-04-27 | Resolved: 2020-11-12

## 2020-11-12 LAB
ANION GAP SERPL CALC-SCNC: 8 MMOL/L (ref 8–16)
BASOPHILS # BLD AUTO: 0.01 K/UL (ref 0–0.2)
BASOPHILS NFR BLD: 0.1 % (ref 0–1.9)
BUN SERPL-MCNC: 23 MG/DL (ref 8–23)
CALCIUM SERPL-MCNC: 8.4 MG/DL (ref 8.7–10.5)
CHLORIDE SERPL-SCNC: 110 MMOL/L (ref 95–110)
CO2 SERPL-SCNC: 26 MMOL/L (ref 23–29)
CREAT SERPL-MCNC: 1.1 MG/DL (ref 0.5–1.4)
CRP SERPL-MCNC: 104 MG/L (ref 0–8.2)
DIFFERENTIAL METHOD: ABNORMAL
EOSINOPHIL # BLD AUTO: 0.1 K/UL (ref 0–0.5)
EOSINOPHIL NFR BLD: 1 % (ref 0–8)
ERYTHROCYTE [DISTWIDTH] IN BLOOD BY AUTOMATED COUNT: 14.4 % (ref 11.5–14.5)
EST. GFR  (AFRICAN AMERICAN): >60 ML/MIN/1.73 M^2
EST. GFR  (NON AFRICAN AMERICAN): >60 ML/MIN/1.73 M^2
GLUCOSE SERPL-MCNC: 107 MG/DL (ref 70–110)
HCT VFR BLD AUTO: 32.7 % (ref 40–54)
HGB BLD-MCNC: 9.9 G/DL (ref 14–18)
IMM GRANULOCYTES # BLD AUTO: 0.02 K/UL (ref 0–0.04)
IMM GRANULOCYTES NFR BLD AUTO: 0.3 % (ref 0–0.5)
LYMPHOCYTES # BLD AUTO: 1 K/UL (ref 1–4.8)
LYMPHOCYTES NFR BLD: 14.8 % (ref 18–48)
MCH RBC QN AUTO: 27.7 PG (ref 27–31)
MCHC RBC AUTO-ENTMCNC: 30.3 G/DL (ref 32–36)
MCV RBC AUTO: 91 FL (ref 82–98)
MONOCYTES # BLD AUTO: 0.3 K/UL (ref 0.3–1)
MONOCYTES NFR BLD: 4.3 % (ref 4–15)
NEUTROPHILS # BLD AUTO: 5.4 K/UL (ref 1.8–7.7)
NEUTROPHILS NFR BLD: 79.5 % (ref 38–73)
NRBC BLD-RTO: 0 /100 WBC
PLATELET # BLD AUTO: 139 K/UL (ref 150–350)
PMV BLD AUTO: 11 FL (ref 9.2–12.9)
POTASSIUM SERPL-SCNC: 4.3 MMOL/L (ref 3.5–5.1)
RBC # BLD AUTO: 3.58 M/UL (ref 4.6–6.2)
SODIUM SERPL-SCNC: 144 MMOL/L (ref 136–145)
WBC # BLD AUTO: 6.81 K/UL (ref 3.9–12.7)

## 2020-11-12 PROCEDURE — 36415 COLL VENOUS BLD VENIPUNCTURE: CPT

## 2020-11-12 PROCEDURE — 80048 BASIC METABOLIC PNL TOTAL CA: CPT

## 2020-11-12 PROCEDURE — 86140 C-REACTIVE PROTEIN: CPT

## 2020-11-12 PROCEDURE — 25000003 PHARM REV CODE 250: Performed by: STUDENT IN AN ORGANIZED HEALTH CARE EDUCATION/TRAINING PROGRAM

## 2020-11-12 PROCEDURE — 85025 COMPLETE CBC W/AUTO DIFF WBC: CPT

## 2020-11-12 RX ORDER — ACETAMINOPHEN 500 MG
1000 TABLET ORAL EVERY 8 HOURS
Qty: 15 TABLET | Refills: 0 | Status: ON HOLD | OUTPATIENT
Start: 2020-11-12 | End: 2020-11-30 | Stop reason: HOSPADM

## 2020-11-12 RX ORDER — GABAPENTIN 300 MG/1
600 CAPSULE ORAL 2 TIMES DAILY
Qty: 120 CAPSULE | Refills: 1 | Status: SHIPPED | OUTPATIENT
Start: 2020-11-12 | End: 2021-04-07 | Stop reason: CLARIF

## 2020-11-12 RX ORDER — IBUPROFEN 800 MG/1
800 TABLET ORAL EVERY 8 HOURS
Qty: 15 TABLET | Refills: 0 | Status: ON HOLD | OUTPATIENT
Start: 2020-11-12 | End: 2020-11-30 | Stop reason: HOSPADM

## 2020-11-12 RX ORDER — OXYCODONE HYDROCHLORIDE 5 MG/1
5 TABLET ORAL EVERY 6 HOURS PRN
Qty: 20 TABLET | Refills: 0 | Status: ON HOLD | OUTPATIENT
Start: 2020-11-12 | End: 2020-11-30

## 2020-11-12 RX ORDER — TAMSULOSIN HYDROCHLORIDE 0.4 MG/1
0.4 CAPSULE ORAL DAILY
Qty: 30 CAPSULE | Refills: 11 | Status: ON HOLD | OUTPATIENT
Start: 2020-11-13 | End: 2020-12-18

## 2020-11-12 RX ADMIN — ACETAMINOPHEN 1000 MG: 500 TABLET ORAL at 01:11

## 2020-11-12 RX ADMIN — IBUPROFEN 800 MG: 400 TABLET, FILM COATED ORAL at 05:11

## 2020-11-12 RX ADMIN — AMLODIPINE BESYLATE 5 MG: 5 TABLET ORAL at 08:11

## 2020-11-12 RX ADMIN — TAMSULOSIN HYDROCHLORIDE 0.4 MG: 0.4 CAPSULE ORAL at 08:11

## 2020-11-12 RX ADMIN — MUPIROCIN: 20 OINTMENT TOPICAL at 08:11

## 2020-11-12 RX ADMIN — GABAPENTIN 600 MG: 300 CAPSULE ORAL at 08:11

## 2020-11-12 RX ADMIN — IBUPROFEN 800 MG: 400 TABLET, FILM COATED ORAL at 01:11

## 2020-11-12 RX ADMIN — ACETAMINOPHEN 1000 MG: 500 TABLET ORAL at 05:11

## 2020-11-12 RX ADMIN — CARVEDILOL 12.5 MG: 12.5 TABLET, FILM COATED ORAL at 08:11

## 2020-11-12 RX ADMIN — OXYCODONE HYDROCHLORIDE 5 MG: 5 TABLET ORAL at 01:11

## 2020-11-12 NOTE — PLAN OF CARE
Patient medically ready for discharge to home with HH ([facility]). Family/patient aware of discharge.    Future Appointments   Date Time Provider Department Center   11/17/2020  9:00 AM LAB, SBP SBPH LAB [facility]   11/18/2020  8:00 AM Reddy Parekh MD [facility] RHEUM [facility]   11/19/2020  9:00 AM Michael Pinzon MD [facility] HNSO [facility]   11/24/2020  9:40 AM Catarino Mota MD [facility] UROLOGY [facility]   11/24/2020 11:00 AM CED Haley MD [facility] COLSURG [facility]   12/21/2020  8:00 AM LAB, SBP SBP LAB [facility]   12/30/2020  8:30 AM Alexandrea Purdy NP SBPCO [facility]   3/23/2021  1:40 PM CED Haley MD [facility] COLON [facility]        11/12/20 1414   Final Note   Assessment Type Final Discharge Note   Anticipated Discharge Disposition Home-Health   Hospital Follow Up  Appt(s) scheduled? Yes   Discharge plans and expectations educations in teach back method with documentation complete? Yes   Right Care Referral Info   Post Acute Recommendation Home-care   Post-Acute Status   Post-Acute Authorization Home Health   Home Health Status Set-up Complete   Discharge Delays None known at this time     Ciera Jacob  Case Management  Ext: [phone]  11/12/2020

## 2020-11-12 NOTE — PLAN OF CARE
Problem: Adult Inpatient Plan of Care  Goal: Patient-Specific Goal (Individualization)  Outcome: Ongoing, Progressing   Patient without any complications overnight. Pain controlled with PO oxycodone. Patient's ileostomy has good output. Little in place. Educated on plan of care. Will continue to monitor.

## 2020-11-12 NOTE — PLAN OF CARE
Reviewed plan of care with patient.  Patient is alert, oriented x 4, independent, ambulatory w/standby assist, and runs NSR on the monitor.   REPAIR, HERNIA, PARASTOMAL  LYSIS, ADHESIONS, Post-Op Day #3.  PT/OT consults ordered.  Ileostomy (1/7/19), self-care completed.  Little Catheter removed, due to void at 1420 hours.  Patient has remained free of falls/trauma/injury.  Patient verbalized understanding of all instructions.

## 2020-11-12 NOTE — HPI
"Jarrett Charlton is a 70yoM with a history of ileostomy dysfunction secondary to parastomal and incisional hernias. The patient presents to the hospital for elective parastomal hernia repair.     His course is as follows:  The patient is an established patient to this practice.   Course is as follows:  Patient is a 70 y.o. male presents with ileostomy dysfunction  History of ulcerative colitis s/p total proctocolectomy with end ileostomy (1985)  He has required revisions of the ileostomy and ultimately it was transposed to the right side due to parastomal and incisional hernias.  He has had intermittent problems with syncope due to dehydration from the high output ostomy and takes imodium and Pedialyte as needed as well as daily Citrucel.  History also includes diabetes (on metformin) gout, CKD II and HTN.  He had a TURP in 1/2019 and is under surveillance with regular PSAs.     1/29/19: He has been doing well for multiple years.  Over the past 6-8 years, he has had intermittent episodes of dehydration and syncope.  This is 2-3 times per year.  He empties his bag 6-7 times per day.  Changes is pouch every 3-4 days.  No issues with his perineal incision. Overall feels well and is active.  Syncopal episodes require ED transfer and frequent hospital admission.                  - started on imodium     2/26/19: Has taken imodium intermittently when stoma output is high but has not taken it scheduled. Thinks the imodium cuts his output about in half but has not taken exact measurements. Still empties his bag 6-8 times a day, he estimates about 500cc per empty. Had an episode of dehydration/near syncope earlier in the month. Also notes intermittent episodes of being "stopped up" associated with RLQ pain lasting up to 24h. He treats this by drinking large volumes of Pedialyte which eventually return of stoma output. Currently feels well, no lightheadedness or dizziness at this time.     8/2/19:  Admitted for bowel " obstruction secondary to incarcerated peristomal hernia on 07/19/2019.  This was reduced in the ER with return of bowel function.   In regards to his diarrhea, he overall feels substantially improved by taking fiber and having decreased output.  Regarding his parastomal hernia, he has never had complaints or recollection of a incarcerated peristomal in the past.  He has had intermittent bowel obstructions they have all been secondary to adhesive disease.  He denies any pain around the stoma.  He is very concerned that revision of the ostomy may result in worse adhesion of the pouch.  He has previously had a left-sided ileostomy that was flat with the scan and did not pouch well.     9/22/2020:  Presented for left lower quadrant abdominal pain.  He complains of abdominal pain and bloating after eating.  No issues with this stoma.  Taking Imodium every other day.  No further dehydration in syncope.  Kidney numbers have improved.  Is taking probiotics.               - admitted 09/20 5-927 for small-bowel obstruction managed conservatively.     10/27/2020:  Presents for repeat evaluation for intermittent abdominal bloating and pain.  States he is unable to eat large meals secondary to bloating and distention associated with pain.  Has food fear.  This is impacting his daily quality of life.  Functionally, he continues to be active.  No exertional angina or dyspnea on exertion.  He takes Plavix and aspirin.  He is pleased with his ileostomy placement.     Review of Systems:  Review of Systems   Constitutional: Negative for chills, diaphoresis, fever, malaise/fatigue and weight loss.   HENT: Negative for congestion.    Respiratory: Negative for shortness of breath.    Cardiovascular: Negative for chest pain and leg swelling.   Gastrointestinal: Positive for abdominal pain and diarrhea. Negative for blood in stool, constipation, nausea and vomiting.   Genitourinary: Negative for dysuria.   Musculoskeletal: Negative for  "back pain and myalgias.   Skin: Negative for rash.   Neurological: Negative for dizziness, sensory change and weakness.   Endo/Heme/Allergies: Does not bruise/bleed easily.   Psychiatric/Behavioral: Negative for depression.         OBJECTIVE:      Vital Signs (Most Recent)  BP (!) 160/90 (BP Location: Right arm, Patient Position: Sitting, BP Method: Large (Manual))   Ht 6' 2" (1.88 m)   Wt 98.9 kg (218 lb 1.6 oz)   BMI 28.00 kg/m²      Physical Exam:  General: White male in no distress   Neuro: alert and oriented x 4.  Moves all extremities.     HEENT: no icterus.  Trachea midline  Respiratory: respirations are even and unlabored  Cardiac: regular rate  Abdomen:  Midline laparotomy is well-healed.  Prior ileostomy site in the left upper quadrant is also well healed.  No hernia on the left upper quadrant. Ileostomy currently sits in the right upper quadrant is pink protrudes above the level of the skin.  Moderate peristomal hernia  Extremities: Warm dry and intact  Skin: no rashes  Anorectal:  Deferred     Labs: H/H = 12/38.  Cr = 1.4.  Albumin of 4.5     Imaging:    CT from 07/19/2019 personally reviewed and demonstrates parastomal hernia in the right lower quadrant colostomy showing small-bowel obstruction/strangulation with dilatation proximally to the loop and small bowel fecalization.     CT 09/25/2020:               - Postsurgical bowel changes with right lower ostomy noting nonobstructed fat and bowel containing parastomal hernia.              - Fluid distended loops of proximal bowel in the left abdomen with nearby small bowel feces sign could relate to component of oral contrast bolus with delayed transit.  Additional consideration to include sequela of early partial small bowel obstruction with possible area of transition in the left upper abdomen.      Small-bowel follow-through 10/26/2020: Small duodenal diverticulum.  Otherwise, normal small bowel follow through evaluation without any evidence of " delayed small bowel transit, obstruction, or stricture.     ASSESSMENT/PLAN:      Jarrett was seen today for pre-op exam.     Diagnoses and all orders for this visit:     Intestinal adhesions with partial obstruction     Parastomal hernia with obstruction and without gangrene           70-year-old gentleman with ulcerative colitis status post total procto colectomy and end ileostomy in 1985.  Since procedure, he has had issues with intermittent dehydration resulting in syncope secondary to high output.  This has been followed by intermittent small-bowel obstruction requiring hospitalization x 3.  He now has developed food fear secondary to chronic partial small-bowel obstructions.  Additionally, he has a moderate peristomal hernia that was complicated by 1 incarcerated event that spontaneously resolved.  We discussed today that he has 2 options.  First option is continued observation.  We discussed this likely would result ongoing partial small bowel obstruction secondary to prior scar tissue.  Sec option would be for redo exploratory laparotomy with extensive lysis of adhesions and revision of his stoma with celine Spencer parastomal hernia repair.  This could be done with a phase ex mesh so that no permanent mesh was implanted.  We discussed the risk of recurrence of adhesive small bowel obstruction, damage to surrounding structures, the need for small bowel resection, worsening of his dehydration, need to revise is ileostomy, difficulty with pouching.  Given the severity of his symptoms, we believe that the chance of benefit from his operation outweighs the potential chance of risk.  Therefore, he wishes to proceed with redo exploratory laparotomy, extensive lysis of adhesions, and parastomal hernia repair.  He will need to stop his Plavix 5 days prior.  Multiple dates were given to him today.  He will let us know which date works for him.  Will plan to keep his ostomy in the same location.  He wants to ensure that  his ostomy buds to allow ease of pouching.

## 2020-11-12 NOTE — SUBJECTIVE & OBJECTIVE
Subjective:     Interval History: Patient seen and examined this morning. POD3. Tolerating diet, pain well controlled, ostomy functioning appropriately. Excellent urine output over last 24hrs. Plan for voiding trial today. No new complaints at this time.     Post-Op Info:  Procedure(s) (LRB):  REPAIR, HERNIA, PARASTOMAL (N/A)  LYSIS, ADHESIONS,  ERAS low (N/A)   3 Days Post-Op      Medications:  Continuous Infusions:  Scheduled Meds:   acetaminophen  1,000 mg Oral Q8H    amitriptyline  50 mg Oral QHS    amLODIPine  5 mg Oral Daily    bupivacaine liposome (PF)  20 mL Infiltration Once    carvediloL  12.5 mg Oral BID    enoxaparin  40 mg Subcutaneous Q24H    gabapentin  600 mg Oral BID    ibuprofen  800 mg Oral Q8H    mupirocin   Nasal BID    tamsulosin  0.4 mg Oral Daily     PRN Meds:   HYDROmorphone    ondansetron    oxyCODONE    oxyCODONE    sodium chloride 0.9%    traMADoL        Objective:     Vital Signs (Most Recent):  Temp: 97.4 °F (36.3 °C) (11/12/20 0424)  Pulse: 79 (11/12/20 0701)  Resp: 20 (11/12/20 0424)  BP: (!) (P) 155/86 (11/12/20 0719)  SpO2: (!) 94 % (11/12/20 0424) Vital Signs (24h Range):  Temp:  [96.3 °F (35.7 °C)-97.8 °F (36.6 °C)] 97.4 °F (36.3 °C)  Pulse:  [67-87] 79  Resp:  [18-20] 20  SpO2:  [93 %-97 %] 94 %  BP: (121-172)/(69-93) (P) 155/86     Intake/Output - Last 3 Shifts       11/10 0700 - 11/11 0659 11/11 0700 - 11/12 0659 11/12 0700 - 11/13 0659    P.O. 50 1560     I.V. (mL/kg) 3777.1 (39.6)      Total Intake(mL/kg) 3827.1 (40.2) 1560 (16.4)     Urine (mL/kg/hr) 1825 (0.8) 2500 (1.1)     Stool 300 650     Total Output 2125 3150     Net +1702.1 -1590            Stool Occurrence 0 x            Physical Exam  Vitals signs and nursing note reviewed.   Constitutional:       General: He is not in acute distress.     Appearance: He is normal weight. He is not ill-appearing or toxic-appearing.   HENT:      Head: Normocephalic and atraumatic.      Nose: Nose normal.       Mouth/Throat:      Mouth: Mucous membranes are moist.   Neck:      Musculoskeletal: Normal range of motion.   Cardiovascular:      Rate and Rhythm: Normal rate and regular rhythm.      Pulses: Normal pulses.   Pulmonary:      Effort: Pulmonary effort is normal. No respiratory distress.   Abdominal:      Comments: Abdomen soft, mildly distended  Tender to palpation throughout abdomen  Midline incision with provena wound vac; minimal output  RUQ ileostomy without return of function   Musculoskeletal: Normal range of motion.   Skin:     General: Skin is warm.   Neurological:      General: No focal deficit present.      Mental Status: He is alert.   Psychiatric:         Mood and Affect: Mood normal.           Significant Labs:  CBC (Last 3 Results):   Recent Labs   Lab 11/10/20  1511 11/11/20  0402 11/12/20  0443   WBC 9.50 7.26 6.81   RBC 4.19* 3.60* 3.58*   HGB 11.6* 10.1* 9.9*   HCT 37.9* 33.1* 32.7*    126* 139*   MCV 91 92 91   MCH 27.7 28.1 27.7   MCHC 30.6* 30.5* 30.3*     CMP (Last 3 Results):   Recent Labs   Lab 11/10/20  0555 11/11/20  0402 11/12/20  0443   * 109 107   CALCIUM 8.3* 8.3* 8.4*    145 144   K 4.6 4.2 4.3   CO2 24 24 26    113* 110   BUN 29* 28* 23   CREATININE 1.3 1.1 1.1       Significant Diagnostics:  I have reviewed all pertinent imaging results/findings within the past 24 hours.

## 2020-11-12 NOTE — PROGRESS NOTES
[facility]  Colorectal Surgery  Progress Note    Patient Name: Jarrett Charlton Jr.  MRN: [id number]  Admission Date: 11/9/2020  Hospital Length of Stay: 3 days  Attending Physician: CED Haley MD    Subjective:     Interval History: Patient seen and examined this morning. POD3. Tolerating diet, pain well controlled, ostomy functioning appropriately. Excellent urine output over last 24hrs. Plan for voiding trial today. No new complaints at this time.     Post-Op Info:  Procedure(s) (LRB):  REPAIR, HERNIA, PARASTOMAL (N/A)  LYSIS, ADHESIONS,  ERAS low (N/A)   3 Days Post-Op      Medications:  Continuous Infusions:  Scheduled Meds:   acetaminophen  1,000 mg Oral Q8H    amitriptyline  50 mg Oral QHS    amLODIPine  5 mg Oral Daily    bupivacaine liposome (PF)  20 mL Infiltration Once    carvediloL  12.5 mg Oral BID    enoxaparin  40 mg Subcutaneous Q24H    gabapentin  600 mg Oral BID    ibuprofen  800 mg Oral Q8H    mupirocin   Nasal BID    tamsulosin  0.4 mg Oral Daily     PRN Meds:   HYDROmorphone    ondansetron    oxyCODONE    oxyCODONE    sodium chloride 0.9%    traMADoL        Objective:     Vital Signs (Most Recent):  Temp: 97.4 °F (36.3 °C) (11/12/20 0424)  Pulse: 79 (11/12/20 0701)  Resp: 20 (11/12/20 0424)  BP: (!) (P) 155/86 (11/12/20 0719)  SpO2: (!) 94 % (11/12/20 0424) Vital Signs (24h Range):  Temp:  [96.3 °F (35.7 °C)-97.8 °F (36.6 °C)] 97.4 °F (36.3 °C)  Pulse:  [67-87] 79  Resp:  [18-20] 20  SpO2:  [93 %-97 %] 94 %  BP: (121-172)/(69-93) (P) 155/86     Intake/Output - Last 3 Shifts       11/10 0700 - 11/11 0659 11/11 0700 - 11/12 0659 11/12 0700 - 11/13 0659    P.O. 50 1560     I.V. (mL/kg) 3777.1 (39.6)      Total Intake(mL/kg) 3827.1 (40.2) 1560 (16.4)     Urine (mL/kg/hr) 1825 (0.8) 2500 (1.1)     Stool 300 650     Total Output 2125 3150     Net +1702.1 -1590            Stool Occurrence 0 x            Physical Exam  Vitals signs and nursing note reviewed.    Constitutional:       General: He is not in acute distress.     Appearance: He is normal weight. He is not ill-appearing or toxic-appearing.   HENT:      Head: Normocephalic and atraumatic.      Nose: Nose normal.      Mouth/Throat:      Mouth: Mucous membranes are moist.   Neck:      Musculoskeletal: Normal range of motion.   Cardiovascular:      Rate and Rhythm: Normal rate and regular rhythm.      Pulses: Normal pulses.   Pulmonary:      Effort: Pulmonary effort is normal. No respiratory distress.   Abdominal:      Comments: Abdomen soft, mildly distended  Tender to palpation throughout abdomen  Midline incision with provena wound vac; minimal output  RUQ ileostomy without return of function   Musculoskeletal: Normal range of motion.   Skin:     General: Skin is warm.   Neurological:      General: No focal deficit present.      Mental Status: He is alert.   Psychiatric:         Mood and Affect: Mood normal.           Significant Labs:  CBC (Last 3 Results):   Recent Labs   Lab 11/10/20  1511 11/11/20  0402 11/12/20  0443   WBC 9.50 7.26 6.81   RBC 4.19* 3.60* 3.58*   HGB 11.6* 10.1* 9.9*   HCT 37.9* 33.1* 32.7*    126* 139*   MCV 91 92 91   MCH 27.7 28.1 27.7   MCHC 30.6* 30.5* 30.3*     CMP (Last 3 Results):   Recent Labs   Lab 11/10/20  0555 11/11/20  0402 11/12/20  0443   * 109 107   CALCIUM 8.3* 8.3* 8.4*    145 144   K 4.6 4.2 4.3   CO2 24 24 26    113* 110   BUN 29* 28* 23   CREATININE 1.3 1.1 1.1       Significant Diagnostics:  I have reviewed all pertinent imaging results/findings within the past 24 hours.    Assessment/Plan:     Partial small bowel obstruction  Jarrett Charlton is a 70yoM with ileostomy in place who presented for elective parastomal hernia repair with mesh underlay and extensive lysis of adhesions on 11/9/20.     - POD3  - patient continues to progress appropriately post-operatively  - excellent ostomy output  - tolerating low residue diet  - f/u CRP  - d/c  lee catheter today for voiding trial  - PT/OT eval and treat    - lovenox for DVT ppx  - encourage OOBTC, ambulation, IS use          Waldemar Nuñez MD  Colorectal Surgery  [facility]

## 2020-11-12 NOTE — NURSING
Patient discharged.  AVS received.  PIVS x 2 removed and telemetry secured.  Prevena Wound VAC intact.  Patient performed Ileostomy care.  Patient urinated after Little discontinued.  Bedside delivery completed.  Daughter at bedside to drive Patient home.

## 2020-11-12 NOTE — PLAN OF CARE
11/12/20 1242   Post-Acute Status   Post-Acute Authorization Home Health   Home Health Status Referrals Sent   Discharge Delays None known at this time   Discharge Plan   Discharge Plan A Home Health     SW spoke with pt regarding HH recs. Pt agreeable to HH through [facility].    Ciera Jacob, MICHELLE [phone]

## 2020-11-12 NOTE — DISCHARGE SUMMARY
[facility]  Colorectal Surgery  Discharge Summary      Patient Name: Jarrett Charlton Jr.  MRN: [id number]  Admission Date: 11/9/2020  Hospital Length of Stay: 3 days  Discharge Date and Time:  11/12/2020 11:35 AM  Attending Physician: CED Haley MD   Discharging Provider: Waldemar Nuñez MD  Primary Care Provider: Poli Gonzalez MD     HPI:  Jarrett Charlton is a 70yoM with a history of ileostomy dysfunction secondary to parastomal and incisional hernias. The patient presents to the hospital for elective parastomal hernia repair.     His course is as follows:  The patient is an established patient to this practice.   Course is as follows:  Patient is a 70 y.o. male presents with ileostomy dysfunction  History of ulcerative colitis s/p total proctocolectomy with end ileostomy (1985)  He has required revisions of the ileostomy and ultimately it was transposed to the right side due to parastomal and incisional hernias.  He has had intermittent problems with syncope due to dehydration from the high output ostomy and takes imodium and Pedialyte as needed as well as daily Citrucel.  History also includes diabetes (on metformin) gout, CKD II and HTN.  He had a TURP in 1/2019 and is under surveillance with regular PSAs.     1/29/19: He has been doing well for multiple years.  Over the past 6-8 years, he has had intermittent episodes of dehydration and syncope.  This is 2-3 times per year.  He empties his bag 6-7 times per day.  Changes is pouch every 3-4 days.  No issues with his perineal incision. Overall feels well and is active.  Syncopal episodes require ED transfer and frequent hospital admission.                  - started on imodium     2/26/19: Has taken imodium intermittently when stoma output is high but has not taken it scheduled. Thinks the imodium cuts his output about in half but has not taken exact measurements. Still empties his bag 6-8 times a day, he estimates about 500cc per  "empty. Had an episode of dehydration/near syncope earlier in the month. Also notes intermittent episodes of being "stopped up" associated with RLQ pain lasting up to 24h. He treats this by drinking large volumes of Pedialyte which eventually return of stoma output. Currently feels well, no lightheadedness or dizziness at this time.     8/2/19:  Admitted for bowel obstruction secondary to incarcerated peristomal hernia on 07/19/2019.  This was reduced in the ER with return of bowel function.   In regards to his diarrhea, he overall feels substantially improved by taking fiber and having decreased output.  Regarding his parastomal hernia, he has never had complaints or recollection of a incarcerated peristomal in the past.  He has had intermittent bowel obstructions they have all been secondary to adhesive disease.  He denies any pain around the stoma.  He is very concerned that revision of the ostomy may result in worse adhesion of the pouch.  He has previously had a left-sided ileostomy that was flat with the scan and did not pouch well.     9/22/2020:  Presented for left lower quadrant abdominal pain.  He complains of abdominal pain and bloating after eating.  No issues with this stoma.  Taking Imodium every other day.  No further dehydration in syncope.  Kidney numbers have improved.  Is taking probiotics.               - admitted 09/20 5-927 for small-bowel obstruction managed conservatively.     10/27/2020:  Presents for repeat evaluation for intermittent abdominal bloating and pain.  States he is unable to eat large meals secondary to bloating and distention associated with pain.  Has food fear.  This is impacting his daily quality of life.  Functionally, he continues to be active.  No exertional angina or dyspnea on exertion.  He takes Plavix and aspirin.  He is pleased with his ileostomy placement.     Review of Systems:  Review of Systems   Constitutional: Negative for chills, diaphoresis, fever, " "malaise/fatigue and weight loss.   HENT: Negative for congestion.    Respiratory: Negative for shortness of breath.    Cardiovascular: Negative for chest pain and leg swelling.   Gastrointestinal: Positive for abdominal pain and diarrhea. Negative for blood in stool, constipation, nausea and vomiting.   Genitourinary: Negative for dysuria.   Musculoskeletal: Negative for back pain and myalgias.   Skin: Negative for rash.   Neurological: Negative for dizziness, sensory change and weakness.   Endo/Heme/Allergies: Does not bruise/bleed easily.   Psychiatric/Behavioral: Negative for depression.         OBJECTIVE:      Vital Signs (Most Recent)  BP (!) 160/90 (BP Location: Right arm, Patient Position: Sitting, BP Method: Large (Manual))   Ht 6' 2" (1.88 m)   Wt 98.9 kg (218 lb 1.6 oz)   BMI 28.00 kg/m²      Physical Exam:  General: White male in no distress   Neuro: alert and oriented x 4.  Moves all extremities.     HEENT: no icterus.  Trachea midline  Respiratory: respirations are even and unlabored  Cardiac: regular rate  Abdomen:  Midline laparotomy is well-healed.  Prior ileostomy site in the left upper quadrant is also well healed.  No hernia on the left upper quadrant. Ileostomy currently sits in the right upper quadrant is pink protrudes above the level of the skin.  Moderate peristomal hernia  Extremities: Warm dry and intact  Skin: no rashes  Anorectal:  Deferred     Labs: H/H = 12/38.  Cr = 1.4.  Albumin of 4.5     Imaging:    CT from 07/19/2019 personally reviewed and demonstrates parastomal hernia in the right lower quadrant colostomy showing small-bowel obstruction/strangulation with dilatation proximally to the loop and small bowel fecalization.     CT 09/25/2020:               - Postsurgical bowel changes with right lower ostomy noting nonobstructed fat and bowel containing parastomal hernia.              - Fluid distended loops of proximal bowel in the left abdomen with nearby small bowel feces sign " could relate to component of oral contrast bolus with delayed transit.  Additional consideration to include sequela of early partial small bowel obstruction with possible area of transition in the left upper abdomen.      Small-bowel follow-through 10/26/2020: Small duodenal diverticulum.  Otherwise, normal small bowel follow through evaluation without any evidence of delayed small bowel transit, obstruction, or stricture.     ASSESSMENT/PLAN:      Jarrett was seen today for pre-op exam.     Diagnoses and all orders for this visit:     Intestinal adhesions with partial obstruction     Parastomal hernia with obstruction and without gangrene           70-year-old gentleman with ulcerative colitis status post total procto colectomy and end ileostomy in 1985.  Since procedure, he has had issues with intermittent dehydration resulting in syncope secondary to high output.  This has been followed by intermittent small-bowel obstruction requiring hospitalization x 3.  He now has developed food fear secondary to chronic partial small-bowel obstructions.  Additionally, he has a moderate peristomal hernia that was complicated by 1 incarcerated event that spontaneously resolved.  We discussed today that he has 2 options.  First option is continued observation.  We discussed this likely would result ongoing partial small bowel obstruction secondary to prior scar tissue.  Sec option would be for redo exploratory laparotomy with extensive lysis of adhesions and revision of his stoma with sugar Spencer parastomal hernia repair.  This could be done with a phase ex mesh so that no permanent mesh was implanted.  We discussed the risk of recurrence of adhesive small bowel obstruction, damage to surrounding structures, the need for small bowel resection, worsening of his dehydration, need to revise is ileostomy, difficulty with pouching.  Given the severity of his symptoms, we believe that the chance of benefit from his operation outweighs  the potential chance of risk.  Therefore, he wishes to proceed with redo exploratory laparotomy, extensive lysis of adhesions, and parastomal hernia repair.  He will need to stop his Plavix 5 days prior.  Multiple dates were given to him today.  He will let us know which date works for him.  Will plan to keep his ostomy in the same location.  He wants to ensure that his ostomy buds to allow ease of pouching.         Procedure(s) (LRB):  REPAIR, HERNIA, PARASTOMAL (N/A)  LYSIS, ADHESIONS,  ERAS low (N/A)     Hospital Course:  Jarrett Charlton underwent an open parastomal hernia repair with mesh underlay, sugar baker fashion with extensive lysis of adhesions. The patient progressed appropriately post-operative with an uneventful course. The patient achieved pain control with oral pain medications, his ileostomy returned to function on POD2, and he began to tolerated a low residue diet on POD2. The patient successfully passed a voiding trial on POD3. Patient to be discharged with provena wound vac in place. The patient voices a desire to be discharged home. Plan to follow up with Dr. Charlton in one week for provena wound vac removal.           Significant Diagnostic Studies: Labs:   BMP:   Recent Labs   Lab 11/11/20  0402 11/12/20  0443    107    144   K 4.2 4.3   * 110   CO2 24 26   BUN 28* 23   CREATININE 1.1 1.1   CALCIUM 8.3* 8.4*    and CBC   Recent Labs   Lab 11/10/20  1511 11/11/20  0402 11/12/20  0443   WBC 9.50 7.26 6.81   HGB 11.6* 10.1* 9.9*   HCT 37.9* 33.1* 32.7*    126* 139*       Pending Diagnostic Studies:     None        Final Active Diagnoses:    Diagnosis Date Noted POA    PRINCIPAL PROBLEM:  Ileostomy in place [Z93.2] 11/09/2020 Not Applicable    Partial small bowel obstruction [K56.600] 09/25/2020 Yes      Problems Resolved During this Admission:      Discharged Condition: stable    Disposition: Home or Self Care    Follow Up:  Follow-up Information     PAIGE Haley  MD In 1 week.    Specialty: Colon and Rectal Surgery  Contact information:  [address] [facility] [address]  [phone]                 Patient Instructions:      Diet diabetic     Lifting restrictions   Order Comments: No lifting of objects heavier than 10lbs for 6 weeks     No driving until:   Order Comments: No driving until off of pain medications     Other restrictions (specify):   Order Comments: Ok for baths/showers immediately postoperatively  Leave provena wound vac (purple dressing) in place and connected to portable suction canister  Continue ileostomy care; record ileostomy output until clinic visit     Notify your health care provider if you experience any of the following:  temperature >100.4     Notify your health care provider if you experience any of the following:  persistent nausea and vomiting or diarrhea     Notify your health care provider if you experience any of the following:  severe uncontrolled pain     Notify your health care provider if you experience any of the following:  redness, tenderness, or signs of infection (pain, swelling, redness, odor or green/yellow discharge around incision site)     Notify your health care provider if you experience any of the following:  difficulty breathing or increased cough     Notify your health care provider if you experience any of the following:  persistent dizziness, light-headedness, or visual disturbances     Leave dressing on - Keep it clean, dry, and intact until clinic visit     Activity as tolerated     Medications:  Reconciled Home Medications:      Medication List      START taking these medications    acetaminophen 500 MG tablet  Commonly known as: TYLENOL  Take 2 tablets (1,000 mg total) by mouth every 8 (eight) hours. for 5 days     ibuprofen 800 MG tablet  Commonly known as: ADVIL,MOTRIN  Take 1 tablet (800 mg total) by mouth every 8 (eight) hours. for 5 days     oxyCODONE 5 MG immediate release tablet  Commonly known  as: ROXICODONE  Take 1 tablet (5 mg total) by mouth every 6 (six) hours as needed for Pain.     tamsulosin 0.4 mg Cap  Commonly known as: FLOMAX  Take 1 capsule (0.4 mg total) by mouth once daily.  Start taking on: November 13, 2020        CHANGE how you take these medications    * gabapentin 600 MG tablet  Commonly known as: NEURONTIN  Take 1 tablet (600 mg total) by mouth 2 (two) times daily.  What changed: Another medication with the same name was added. Make sure you understand how and when to take each.     * gabapentin 300 MG capsule  Commonly known as: NEURONTIN  Take 2 capsules (600 mg total) by mouth 2 (two) times daily.  What changed: You were already taking a medication with the same name, and this prescription was added. Make sure you understand how and when to take each.         * This list has 2 medication(s) that are the same as other medications prescribed for you. Read the directions carefully, and ask your doctor or other care provider to review them with you.            CONTINUE taking these medications    ACCU-CHEK PAUL CONTROL SOLN Soln  Generic drug: blood glucose control high,low  1 drop by NOT APPLICABLE route once daily.     ACCU-CHEK SOFTCLIX LANCETS Misc  Generic drug: lancets  1 lancet by NOT APPLICABLE route once daily.     allopurinoL 300 MG tablet  Commonly known as: ZYLOPRIM  Take 1.5 tablets (450 mg total) by mouth once daily.     amitriptyline 25 MG tablet  Commonly known as: ELAVIL  Take 2 tablets (50 mg total) by mouth every evening.     amLODIPine 5 MG tablet  Commonly known as: NORVASC  Take 5 mg by mouth once daily.     amoxicillin-clavulanate 875-125mg 875-125 mg per tablet  Commonly known as: AUGMENTIN  Take 1 tablet by mouth every 12 (twelve) hours.     aspirin 81 MG EC tablet  Commonly known as: ECOTRIN  Take 81 mg by mouth once daily.     BD ALCOHOL SWABS Padm  Generic drug: alcohol swabs     blood sugar diagnostic Strp  Commonly known as: ONETOUCH ULTRA TEST  USE ONE  STRIP TO CHECK GLUCOSE EVERY DAY     brimonidine 0.2% 0.2 % Drop  Commonly known as: ALPHAGAN  INSTILL 1 DROP INTO EACH EYE TWICE DAILY     carvediloL 12.5 MG tablet  Commonly known as: COREG  Take 12.5 mg by mouth 2 (two) times daily.     clopidogreL 75 mg tablet  Commonly known as: PLAVIX  once daily.     dicyclomine 10 MG capsule  Commonly known as: BENTYL  Take 1 capsule (10 mg total) by mouth 4 (four) times daily before meals and nightly.     escitalopram oxalate 10 MG tablet  Commonly known as: LEXAPRO  Take 10 mg by mouth once daily.     glimepiride 4 MG tablet  Commonly known as: AMARYL  Take 1 tablet (4 mg total) by mouth daily with breakfast.     loperamide 2 mg Tab  Commonly known as: IMODIUM A-D  Take 2 mg by mouth once daily.     magnesium oxide 400 mg (241.3 mg magnesium) tablet  Commonly known as: MAG-OX  Take 400 mg by mouth 2 (two) times daily. Take 2 tablets (800 mg) two times a day.     mupirocin 2 % ointment  Commonly known as: BACTROBAN  Apply topically 2 (two) times daily.     omeprazole 40 MG capsule  Commonly known as: PRILOSEC  Take 1 capsule (40 mg total) by mouth once daily.     rosuvastatin 10 MG tablet  Commonly known as: CRESTOR  Take 1 tablet (10 mg total) by mouth every evening.     simethicone 125 mg Cap capsule  Commonly known as: MYLICON  Take 1 capsule (125 mg total) by mouth 4 (four) times daily as needed for Flatulence (gas).     tiZANidine 4 MG tablet  Commonly known as: ZANAFLEX  Take 1 tablet (4 mg total) by mouth 2 (two) times daily as needed.     VITAMIN D ORAL  Take by mouth.        STOP taking these medications    HYDROcodone-acetaminophen 5-325 mg per tablet  Commonly known as: NORCO     ketorolac 10 mg tablet  Commonly known as: TORADOL     predniSONE 20 MG tablet  Commonly known as: DELTASONE            Waldemar Nuñez MD  Colorectal Surgery  [facility]

## 2020-11-12 NOTE — HOSPITAL COURSE
Jarrett Zoltan underwent an open parastomal hernia repair with mesh underlay, sugar baker fashion with extensive lysis of adhesions. The patient progressed appropriately post-operative with an uneventful course. The patient achieved pain control with oral pain medications, his ileostomy returned to function on POD2, and he began to tolerated a low residue diet on POD2. The patient successfully passed a voiding trial on POD3. Patient to be discharged with provena wound vac in place. The patient voices a desire to be discharged home. Plan to follow up with Dr. Charlton in one week for provena wound vac removal.

## 2020-11-12 NOTE — ASSESSMENT & PLAN NOTE
Jarrett Charlton is a 70yoM with ileostomy in place who presented for elective parastomal hernia repair with mesh underlay and extensive lysis of adhesions on 11/9/20.     - POD3  - patient continues to progress appropriately post-operatively  - excellent ostomy output  - tolerating low residue diet  - f/u CRP  - d/c lee catheter today for voiding trial  - PT/OT eval and treat    - lovenox for DVT ppx  - encourage OOBTC, ambulation, IS use

## 2020-11-12 NOTE — PLAN OF CARE
[facility]    HOME HEALTH ORDERS  FACE TO FACE ENCOUNTER    Patient Name: Jarrett Charlton Jr.  YOB: 1950    PCP: Poli Gonzalez MD   PCP Address: [address] / [place] [address]  PCP Phone Number: [phone]  PCP Fax: [phone]    Encounter Date: 11/12/2020    Admit to Home Health    Diagnoses:  Active Hospital Problems    Diagnosis  POA    *Ileostomy in place [Z93.2]  Not Applicable    Partial small bowel obstruction [K56.600]  Yes      Resolved Hospital Problems   No resolved problems to display.       Future Appointments   Date Time Provider Department Center   11/17/2020  9:00 AM LAB, SBP SBP LAB [facility]   11/18/2020  8:00 AM Reddy Parekh MD [facility] RHEUM [facility]   11/19/2020  9:00 AM Michael Pinzon MD [facility] HNSO [facility]   11/24/2020  9:40 AM Catarino Mota MD [facility] UROLOGY [facility]   12/21/2020  8:00 AM LAB, SBP SBP LAB [facility]   12/30/2020  8:30 AM Alexandrea Purdy NP [facility]   3/23/2021  9:40 AM CED Haley MD [facility] COLSURG [facility]     Follow-up Information     W Homero Haley MD In 1 week.    Specialty: Colon and Rectal Surgery  Contact information:  [address] [address]  [phone]             [facility].    Specialty: Home Health Services  Why: [facility]  will contact patient for initial visit  Contact information:  [address]  [address]  [place] [id number] [phone]                     I have seen and examined this patient face to face today. My clinical findings that support the need for the home health skilled services and home bound status are the following:  Medical restrictions requiring assistance of another human to leave home due to Post surgery monitoring.    Allergies:Review of patient's allergies indicates:  No Known Allergies    Diet: regular diet    Activities: activity as tolerated    Nursing:   SN to complete  comprehensive assessment including routine vital signs. Instruct on disease process and s/s of complications to report to MD. Review/verify medication list sent home with the patient at time of discharge  and instruct patient/caregiver as needed. Frequency may be adjusted depending on start of care date. If patient has enteral feeding tube (NG, PEG, J-tube, G-tube), flush tube before and after feeding and/or medication administration with 20-30 mL of water.    Notify MD if SBP > 160 or < 90; DBP > 90 or < 50; HR > 120 or < 50; Temp > 101; Other:   N/a       CONSULTS:    Physical Therapy to evaluate and treat. Evaluate for home safety and equipment needs; Establish/upgrade home exercise program. Perform / instruct on therapeutic exercises, gait training, transfer training, and Range of Motion.  Occupational Therapy to evaluate and treat. Evaluate home environment for safety and equipment needs. Perform/Instruct on transfers, ADL training, ROM, and therapeutic exercises.    MISCELLANEOUS CARE:  N/A    WOUND CARE ORDERS  no      Medications: Review discharge medications with patient and family and provide education.      Current Discharge Medication List      START taking these medications    Details   acetaminophen (TYLENOL) 500 MG tablet Take 2 tablets (1,000 mg total) by mouth every 8 (eight) hours. for 5 days  Qty: 15 tablet, Refills: 0      gabapentin (NEURONTIN) 300 MG capsule Take 2 capsules (600 mg total) by mouth 2 (two) times daily.  Qty: 120 capsule, Refills: 1      ibuprofen (ADVIL,MOTRIN) 800 MG tablet Take 1 tablet (800 mg total) by mouth every 8 (eight) hours. for 5 days  Qty: 15 tablet, Refills: 0      oxyCODONE (ROXICODONE) 5 MG immediate release tablet Take 1 tablet (5 mg total) by mouth every 6 (six) hours as needed for Pain.  Qty: 20 tablet, Refills: 0    Comments: Quantity prescribed more than 7 day supply? No      tamsulosin (FLOMAX) 0.4 mg Cap Take 1 capsule (0.4 mg total) by mouth once  daily.  Qty: 30 capsule, Refills: 11         CONTINUE these medications which have NOT CHANGED    Details   allopurinoL (ZYLOPRIM) 300 MG tablet Take 1.5 tablets (450 mg total) by mouth once daily.  Qty: 135 tablet, Refills: 3      amitriptyline (ELAVIL) 25 MG tablet Take 2 tablets (50 mg total) by mouth every evening.  Qty: 30 tablet, Refills: 1    Associated Diagnoses: Tinnitus, bilateral      amLODIPine (NORVASC) 5 MG tablet Take 5 mg by mouth once daily.    Comments: .      amoxicillin-clavulanate 875-125mg (AUGMENTIN) 875-125 mg per tablet Take 1 tablet by mouth every 12 (twelve) hours.  Qty: 20 tablet, Refills: 0    Associated Diagnoses: Acute parotitis      aspirin (ECOTRIN) 81 MG EC tablet Take 81 mg by mouth once daily.        brimonidine 0.2% (ALPHAGAN) 0.2 % Drop INSTILL 1 DROP INTO EACH EYE TWICE DAILY  Refills: 4      carvediloL (COREG) 12.5 MG tablet Take 12.5 mg by mouth 2 (two) times daily.     Comments: .      dicyclomine (BENTYL) 10 MG capsule Take 1 capsule (10 mg total) by mouth 4 (four) times daily before meals and nightly.  Qty: 120 capsule, Refills: 2      ergocalciferol, vitamin D2, (VITAMIN D ORAL) Take by mouth.      escitalopram oxalate (LEXAPRO) 10 MG tablet Take 10 mg by mouth once daily.      gabapentin (NEURONTIN) 600 MG tablet Take 1 tablet (600 mg total) by mouth 2 (two) times daily.  Qty: 180 tablet, Refills: 3    Associated Diagnoses: Type 2 diabetes mellitus without complication, without long-term current use of insulin; Diabetic polyneuropathy associated with type 2 diabetes mellitus      glimepiride (AMARYL) 4 MG tablet Take 1 tablet (4 mg total) by mouth daily with breakfast.  Qty: 90 tablet, Refills: 1    Associated Diagnoses: Type 2 diabetes mellitus with stage 3 chronic kidney disease, without long-term current use of insulin      loperamide (IMODIUM A-D) 2 mg Tab Take 2 mg by mouth once daily.      magnesium oxide (MAG-OX) 400 mg (241.3 mg magnesium) tablet Take 400 mg by  mouth 2 (two) times daily. Take 2 tablets (800 mg) two times a day.      rosuvastatin (CRESTOR) 10 MG tablet Take 1 tablet (10 mg total) by mouth every evening.  Qty: 10 tablet, Refills: 0      ACCU-CHEK PAUL CONTROL SOLN Soln 1 drop by NOT APPLICABLE route once daily.      ACCU-CHEK SOFTCLIX LANCETS Misc 1 lancet by NOT APPLICABLE route once daily.      BD ALCOHOL SWABS PadM       blood sugar diagnostic (ONE TOUCH ULTRA TEST) [facility] USE ONE STRIP TO CHECK GLUCOSE EVERY DAY  Qty: 100 each, Refills: 11      clopidogrel (PLAVIX) 75 mg tablet once daily.       mupirocin (BACTROBAN) 2 % ointment Apply topically 2 (two) times daily.  Qty: 22 g, Refills: 0    Associated Diagnoses: Paronychia of finger of right hand      omeprazole (PRILOSEC) 40 MG capsule Take 1 capsule (40 mg total) by mouth once daily.  Qty: 90 capsule, Refills: 3    Associated Diagnoses: Gastroesophageal reflux disease, esophagitis presence not specified      simethicone (MYLICON) 125 mg Cap capsule Take 1 capsule (125 mg total) by mouth 4 (four) times daily as needed for Flatulence (gas).  Qty: 60 capsule, Refills: 2    Associated Diagnoses: Partial small bowel obstruction      tiZANidine (ZANAFLEX) 4 MG tablet Take 1 tablet (4 mg total) by mouth 2 (two) times daily as needed.  Qty: 30 tablet, Refills: 0    Associated Diagnoses: Acute midline low back pain without sciatica         STOP taking these medications       ketorolac (TORADOL) 10 mg tablet Comments:   Reason for Stopping:         HYDROcodone-acetaminophen (NORCO) 5-325 mg per tablet Comments:   Reason for Stopping:         predniSONE (DELTASONE) 20 MG tablet Comments:   Reason for Stopping:               I certify that this patient is confined to his home and needs intermittent skilled nursing care, physical therapy and occupational therapy.

## 2020-11-12 NOTE — PLAN OF CARE
Pt free of any injury or falls, VSS, skin intact. Scheduled and PRN meds given as ordered or when requested. Demonstrated ability to use call light when needed. Bed locked in lowest position. Care plan reviewed w/ patient and education given. Pt is not demonstrating any overt S/S of pain or distress at this time. Will continue to monitor.     Equal and normal pulses (carotid, femoral, dorsalis pedis)

## 2020-11-13 ENCOUNTER — TELEPHONE (OUTPATIENT)
Dept: SURGERY | Facility: CLINIC | Age: 70
End: 2020-11-13

## 2020-11-13 ENCOUNTER — PATIENT OUTREACH (OUTPATIENT)
Dept: ADMINISTRATIVE | Facility: CLINIC | Age: 70
End: 2020-11-13

## 2020-11-13 ENCOUNTER — NURSE TRIAGE (OUTPATIENT)
Dept: ADMINISTRATIVE | Facility: CLINIC | Age: 70
End: 2020-11-13

## 2020-11-13 NOTE — TELEPHONE ENCOUNTER
----- Message from Jelena Portillo sent at 11/13/2020  2:02 PM CST -----  ROBIN is calling on behalf of the pt from [facility] and would like for the nurse to give her a call back at [phone] pt is refusing [facility] at this time

## 2020-11-13 NOTE — TELEPHONE ENCOUNTER
----- Message from Coco Murillo RN sent at 11/13/2020  7:53 AM CST -----  Please schedule one week appt and contact patient.  Discharged yesterday.  Thank you

## 2020-11-13 NOTE — PROGRESS NOTES
C3 nurse attempted to contact patient. No answer. The following message was left for the patient to return the call:  Good morning, I am a nurse calling on behalf of [facility] from the Care Coordination Center.  This is a Transitional Care Call for Jarrett Charlton Jr.. When you have a moment please contact us at [phone] or [phone] Monday through Friday, between the hours of 8 am to 4 pm. We look forward to speaking with you. On behalf of [facility] have a nice day.    The patient does not have a scheduled HOSFU appointment within 7-14 days post hospital discharge date 11/12/20. Message sent to Physician staff to assist with HOSFU appointment scheduling.

## 2020-11-13 NOTE — TELEPHONE ENCOUNTER
OCC Rn Patient states he had surgery on Monday 11/09/20.  Pain is 10/10,  Stoma intact. Patient in so much pain cannot answer my questions. States he Thinks he has a bowel obstruction and he is calling 911 now.  He only has his granddaughter with him.  Care advice is to call 911      Reason for Disposition   [1] SEVERE pain (e.g., excruciating) AND [2] present > 1 hour    Protocols used: OSTOMY SYMPTOMS AND [id number]

## 2020-11-14 PROBLEM — R10.9 ABDOMINAL PAIN: Status: ACTIVE | Noted: 2020-11-14

## 2020-11-15 ENCOUNTER — HOSPITAL ENCOUNTER (INPATIENT)
Facility: HOSPITAL | Age: 70
LOS: 15 days | Discharge: HOME | DRG: 390 | End: 2020-11-30
Attending: EMERGENCY MEDICINE | Admitting: COLON & RECTAL SURGERY
Payer: MEDICARE

## 2020-11-15 DIAGNOSIS — Z98.890 STATUS POST REPAIR OF VENTRAL HERNIA: Primary | ICD-10-CM

## 2020-11-15 DIAGNOSIS — R07.9 CHEST PAIN: ICD-10-CM

## 2020-11-15 DIAGNOSIS — Z87.19 STATUS POST REPAIR OF VENTRAL HERNIA: Primary | ICD-10-CM

## 2020-11-15 DIAGNOSIS — R10.9 ABDOMINAL PAIN: ICD-10-CM

## 2020-11-15 LAB
ALBUMIN SERPL BCP-MCNC: 2.7 G/DL (ref 3.5–5.2)
ALP SERPL-CCNC: 111 U/L (ref 55–135)
ALT SERPL W/O P-5'-P-CCNC: 14 U/L (ref 10–44)
ANION GAP SERPL CALC-SCNC: 11 MMOL/L (ref 8–16)
AST SERPL-CCNC: 15 U/L (ref 10–40)
BASOPHILS # BLD AUTO: 0.02 K/UL (ref 0–0.2)
BASOPHILS NFR BLD: 0.3 % (ref 0–1.9)
BILIRUB SERPL-MCNC: 0.8 MG/DL (ref 0.1–1)
BILIRUB UR QL STRIP: NEGATIVE
BUN SERPL-MCNC: 12 MG/DL (ref 8–23)
CALCIUM SERPL-MCNC: 8.7 MG/DL (ref 8.7–10.5)
CHLORIDE SERPL-SCNC: 104 MMOL/L (ref 95–110)
CLARITY UR REFRACT.AUTO: CLEAR
CO2 SERPL-SCNC: 25 MMOL/L (ref 23–29)
COLOR UR AUTO: ABNORMAL
CREAT SERPL-MCNC: 0.9 MG/DL (ref 0.5–1.4)
CTP QC/QA: YES
DIFFERENTIAL METHOD: ABNORMAL
EOSINOPHIL # BLD AUTO: 0.2 K/UL (ref 0–0.5)
EOSINOPHIL NFR BLD: 2.5 % (ref 0–8)
ERYTHROCYTE [DISTWIDTH] IN BLOOD BY AUTOMATED COUNT: 14.4 % (ref 11.5–14.5)
EST. GFR  (AFRICAN AMERICAN): >60 ML/MIN/1.73 M^2
EST. GFR  (NON AFRICAN AMERICAN): >60 ML/MIN/1.73 M^2
GLUCOSE SERPL-MCNC: 114 MG/DL (ref 70–110)
GLUCOSE UR QL STRIP: NEGATIVE
HCT VFR BLD AUTO: 35.8 % (ref 40–54)
HGB BLD-MCNC: 11.1 G/DL (ref 14–18)
HGB UR QL STRIP: NEGATIVE
IMM GRANULOCYTES # BLD AUTO: 0.03 K/UL (ref 0–0.04)
IMM GRANULOCYTES NFR BLD AUTO: 0.4 % (ref 0–0.5)
KETONES UR QL STRIP: ABNORMAL
LACTATE SERPL-SCNC: 1.4 MMOL/L (ref 0.5–2.2)
LEUKOCYTE ESTERASE UR QL STRIP: NEGATIVE
LIPASE SERPL-CCNC: 18 U/L (ref 4–60)
LYMPHOCYTES # BLD AUTO: 1 K/UL (ref 1–4.8)
LYMPHOCYTES NFR BLD: 12.9 % (ref 18–48)
MCH RBC QN AUTO: 27.8 PG (ref 27–31)
MCHC RBC AUTO-ENTMCNC: 31 G/DL (ref 32–36)
MCV RBC AUTO: 90 FL (ref 82–98)
MONOCYTES # BLD AUTO: 0.4 K/UL (ref 0.3–1)
MONOCYTES NFR BLD: 5.2 % (ref 4–15)
NEUTROPHILS # BLD AUTO: 5.9 K/UL (ref 1.8–7.7)
NEUTROPHILS NFR BLD: 78.7 % (ref 38–73)
NITRITE UR QL STRIP: NEGATIVE
NRBC BLD-RTO: 0 /100 WBC
PH UR STRIP: 8 [PH] (ref 5–8)
PLATELET # BLD AUTO: 164 K/UL (ref 150–350)
PMV BLD AUTO: 10.4 FL (ref 9.2–12.9)
POTASSIUM SERPL-SCNC: 3.3 MMOL/L (ref 3.5–5.1)
PROT SERPL-MCNC: 6.1 G/DL (ref 6–8.4)
PROT UR QL STRIP: NEGATIVE
RBC # BLD AUTO: 4 M/UL (ref 4.6–6.2)
SARS-COV-2 RDRP RESP QL NAA+PROBE: NEGATIVE
SODIUM SERPL-SCNC: 140 MMOL/L (ref 136–145)
SP GR UR STRIP: 1.02 (ref 1–1.03)
URN SPEC COLLECT METH UR: ABNORMAL
WBC # BLD AUTO: 7.54 K/UL (ref 3.9–12.7)

## 2020-11-15 PROCEDURE — 63600175 PHARM REV CODE 636 W HCPCS: Performed by: STUDENT IN AN ORGANIZED HEALTH CARE EDUCATION/TRAINING PROGRAM

## 2020-11-15 PROCEDURE — 81003 URINALYSIS AUTO W/O SCOPE: CPT

## 2020-11-15 PROCEDURE — 96375 TX/PRO/DX INJ NEW DRUG ADDON: CPT

## 2020-11-15 PROCEDURE — 25000003 PHARM REV CODE 250: Performed by: STUDENT IN AN ORGANIZED HEALTH CARE EDUCATION/TRAINING PROGRAM

## 2020-11-15 PROCEDURE — 25500020 PHARM REV CODE 255: Performed by: EMERGENCY MEDICINE

## 2020-11-15 PROCEDURE — 80053 COMPREHEN METABOLIC PANEL: CPT

## 2020-11-15 PROCEDURE — G0378 HOSPITAL OBSERVATION PER HR: HCPCS

## 2020-11-15 PROCEDURE — 25000003 PHARM REV CODE 250: Performed by: EMERGENCY MEDICINE

## 2020-11-15 PROCEDURE — 20600001 HC STEP DOWN PRIVATE ROOM

## 2020-11-15 PROCEDURE — 96374 THER/PROPH/DIAG INJ IV PUSH: CPT

## 2020-11-15 PROCEDURE — U0002 COVID-19 LAB TEST NON-CDC: HCPCS | Performed by: STUDENT IN AN ORGANIZED HEALTH CARE EDUCATION/TRAINING PROGRAM

## 2020-11-15 PROCEDURE — 83605 ASSAY OF LACTIC ACID: CPT

## 2020-11-15 PROCEDURE — 63600175 PHARM REV CODE 636 W HCPCS: Performed by: COLON & RECTAL SURGERY

## 2020-11-15 PROCEDURE — 99285 EMERGENCY DEPT VISIT HI MDM: CPT | Mod: 25

## 2020-11-15 PROCEDURE — 85025 COMPLETE CBC W/AUTO DIFF WBC: CPT

## 2020-11-15 PROCEDURE — 96361 HYDRATE IV INFUSION ADD-ON: CPT

## 2020-11-15 PROCEDURE — 83690 ASSAY OF LIPASE: CPT

## 2020-11-15 PROCEDURE — 99285 EMERGENCY DEPT VISIT HI MDM: CPT | Mod: CS,,, | Performed by: EMERGENCY MEDICINE

## 2020-11-15 PROCEDURE — 99285 PR EMERGENCY DEPT VISIT,LEVEL V: ICD-10-PCS | Mod: CS,,, | Performed by: EMERGENCY MEDICINE

## 2020-11-15 RX ORDER — TAMSULOSIN HYDROCHLORIDE 0.4 MG/1
0.4 CAPSULE ORAL DAILY
Status: DISCONTINUED | OUTPATIENT
Start: 2020-11-16 | End: 2020-11-30 | Stop reason: HOSPADM

## 2020-11-15 RX ORDER — SODIUM CHLORIDE, SODIUM LACTATE, POTASSIUM CHLORIDE, CALCIUM CHLORIDE 600; 310; 30; 20 MG/100ML; MG/100ML; MG/100ML; MG/100ML
INJECTION, SOLUTION INTRAVENOUS CONTINUOUS
Status: DISCONTINUED | OUTPATIENT
Start: 2020-11-15 | End: 2020-11-17

## 2020-11-15 RX ORDER — SODIUM CHLORIDE 0.9 % (FLUSH) 0.9 %
10 SYRINGE (ML) INJECTION
Status: DISCONTINUED | OUTPATIENT
Start: 2020-11-15 | End: 2020-11-30 | Stop reason: HOSPADM

## 2020-11-15 RX ORDER — ONDANSETRON 2 MG/ML
4 INJECTION INTRAMUSCULAR; INTRAVENOUS
Status: COMPLETED | OUTPATIENT
Start: 2020-11-15 | End: 2020-11-15

## 2020-11-15 RX ORDER — MORPHINE SULFATE 4 MG/ML
4 INJECTION, SOLUTION INTRAMUSCULAR; INTRAVENOUS
Status: COMPLETED | OUTPATIENT
Start: 2020-11-15 | End: 2020-11-15

## 2020-11-15 RX ORDER — ONDANSETRON 8 MG/1
8 TABLET, ORALLY DISINTEGRATING ORAL EVERY 8 HOURS PRN
Status: DISCONTINUED | OUTPATIENT
Start: 2020-11-15 | End: 2020-11-30 | Stop reason: HOSPADM

## 2020-11-15 RX ORDER — AMLODIPINE BESYLATE 5 MG/1
5 TABLET ORAL DAILY
Status: DISCONTINUED | OUTPATIENT
Start: 2020-11-16 | End: 2020-11-30 | Stop reason: HOSPADM

## 2020-11-15 RX ORDER — GABAPENTIN 300 MG/1
600 CAPSULE ORAL 2 TIMES DAILY
Status: DISCONTINUED | OUTPATIENT
Start: 2020-11-15 | End: 2020-11-30 | Stop reason: HOSPADM

## 2020-11-15 RX ORDER — ACETAMINOPHEN 325 MG/1
650 TABLET ORAL EVERY 8 HOURS PRN
Status: DISCONTINUED | OUTPATIENT
Start: 2020-11-15 | End: 2020-11-30 | Stop reason: HOSPADM

## 2020-11-15 RX ORDER — CARVEDILOL 12.5 MG/1
12.5 TABLET ORAL 2 TIMES DAILY
Status: DISCONTINUED | OUTPATIENT
Start: 2020-11-15 | End: 2020-11-30 | Stop reason: HOSPADM

## 2020-11-15 RX ORDER — OXYCODONE HYDROCHLORIDE 5 MG/1
5 TABLET ORAL EVERY 6 HOURS PRN
Status: DISCONTINUED | OUTPATIENT
Start: 2020-11-15 | End: 2020-11-30 | Stop reason: HOSPADM

## 2020-11-15 RX ORDER — SIMETHICONE 80 MG
1 TABLET,CHEWABLE ORAL 4 TIMES DAILY PRN
Status: DISCONTINUED | OUTPATIENT
Start: 2020-11-15 | End: 2020-11-17

## 2020-11-15 RX ADMIN — OXYCODONE HYDROCHLORIDE 5 MG: 5 TABLET ORAL at 05:11

## 2020-11-15 RX ADMIN — MORPHINE SULFATE 4 MG: 4 INJECTION, SOLUTION INTRAMUSCULAR; INTRAVENOUS at 12:11

## 2020-11-15 RX ADMIN — SODIUM CHLORIDE 1000 ML: 0.9 INJECTION, SOLUTION INTRAVENOUS at 12:11

## 2020-11-15 RX ADMIN — ONDANSETRON 4 MG: 2 INJECTION INTRAMUSCULAR; INTRAVENOUS at 12:11

## 2020-11-15 RX ADMIN — IOHEXOL 100 ML: 350 INJECTION, SOLUTION INTRAVENOUS at 01:11

## 2020-11-15 RX ADMIN — GABAPENTIN 600 MG: 300 CAPSULE ORAL at 09:11

## 2020-11-15 RX ADMIN — SODIUM CHLORIDE, SODIUM LACTATE, POTASSIUM CHLORIDE, AND CALCIUM CHLORIDE: .6; .31; .03; .02 INJECTION, SOLUTION INTRAVENOUS at 05:11

## 2020-11-15 RX ADMIN — CARVEDILOL 12.5 MG: 12.5 TABLET, FILM COATED ORAL at 09:11

## 2020-11-15 NOTE — H&P
Please see consult note dated 11/15/2020 for full admission H&P      Dieter Hutchins M.D.  [facility], PGY 5

## 2020-11-15 NOTE — HPI
Jarrett Charlton  is a 70 y.o. M with hx of UC s/p total proctocolectomy and now 6 days post op from ex lap, lysis of adhesions, parastomal hernia repair with mesh (kaylee) who presents to the ED with bloating and decreased ileostomy output.  He had presented 2 nights ago for the same.  He states that he is afraid to eat.  Having pain around his ileostomy site.  In the ED, labs grossly unremarkable.  CT scan shows no signs of obstruction, fluid collection around mesh unchanged from previous scan.  CRS was consulted for evaluation

## 2020-11-15 NOTE — ED PROVIDER NOTES
Encounter Date: 11/15/2020       History     Chief Complaint   Patient presents with    Abdominal Pain     patient arrives via EMS complaining of generalized abdominal pain, bloating and constipation. RLQ ileostomy bag. reports decreased output since friday. patient also has a woundvac to mid abdomen,      Jarrett Charlton Jr. Is a 70 y.o. male with history of Ulcerative Colitis s/p total proctocolectomy w/ end ileostomy c/b parastomal and incisional hernias, DM, CKDIII, HTN who presents to the ED with a chief complaint of abdominal pain, constipation, and bloating. He most recently underwent an elective hernia repair on 11/9 with a wound vac in place. He states he has not had normal output from his ileostomy bag since the surgery. At most, he has had 1/2 cup of output. He reports abdominal distention that has caused generalized pain throughout the abdomen. He states he has tried to stop the pain medications to see if that helps his constipation with no improvement. He was seen at [facility] ED 2 days ago for a similar complaint. At that time CT abd/pelvis demonstrated a fluid collection w/ multiple foci concerning for intra-abdominal abscess. Colorectal surgery consulted; evaluated patient and had low concern for an abscess and thought it was more likely post-operative fluid collection. No signs of obstruction on CT. He is reporting worsening distention and pain today with chills, sweating, and nausea. He denies fever and vomiting at this time. All other ROS negative.    The history is provided by the patient.     Review of patient's allergies indicates:  No Known Allergies  Past Medical History:   Diagnosis Date    Basal cell carcinoma     BPH (benign prostatic hyperplasia)     s/p TURP    Carotid stenosis     Chronic kidney disease     Claudication     Coronary artery disease     DDD (degenerative disc disease) 10/21/2013    Diabetes mellitus with renal complications     Disc disease, degenerative, cervical      Encounter for blood transfusion     GERD (gastroesophageal reflux disease)     Gout, chronic     History of ulcerative colitis     s/p colectomy and ileostomy    HLD (hyperlipidemia)     HTN (hypertension)     Ileostomy in place 1982    RBBB     Squamous cell carcinoma 03/08/2018    Left superior helix near insertion    Squamous cell carcinoma 04/12/2018    Left forearm x 5    Ventricular tachycardia      Past Surgical History:   Procedure Laterality Date    cardiac stents      CATARACT EXTRACTION Bilateral     CERVICAL FUSION      colectomy and ileostomy  1985    LYSIS OF ADHESIONS N/A 11/9/2020    Procedure: LYSIS, ADHESIONS,  ERAS low;  Surgeon: CED Haley MD;  [facility];  Service: Colon and Rectal;  Laterality: N/A;    REPAIR, HERNIA, PARASTOMAL N/A 11/9/2020    Procedure: REPAIR, HERNIA, PARASTOMAL;  Surgeon: CED Haley MD;  [facility];  Service: Colon and Rectal;  Laterality: N/A;    TRANSURETHRAL RESECTION OF PROSTATE (TURP) WITHOUT USE OF LASER N/A 1/23/2019    Procedure: TURP, WITHOUT USING LASER BIPOLAR;  Surgeon: Catarino Mota MD;  [facility];  Service: Urology;  Laterality: N/A;  1.5 HOURS     Family History   Problem Relation Age of Onset    Cancer Father     Diabetes Father     Dementia Mother     Melanoma Neg Hx     Hypertension Neg Hx     Arthritis Neg Hx      Social History     Tobacco Use    Smoking status: Never Smoker    Smokeless tobacco: Never Used   Substance Use Topics    Alcohol use: No    Drug use: No     Review of Systems   Constitutional: Positive for chills and diaphoresis. Negative for appetite change and fever.   HENT: Negative for congestion, sore throat and trouble swallowing.    Eyes: Negative for photophobia, pain and discharge.   Respiratory: Negative for cough and shortness of breath.    Cardiovascular: Negative for chest pain and palpitations.   Gastrointestinal: Positive for  abdominal distention, abdominal pain, constipation and nausea. Negative for diarrhea and vomiting.   Genitourinary: Negative for difficulty urinating.   Musculoskeletal: Negative for back pain, myalgias and neck pain.   Skin: Positive for wound. Negative for pallor and rash.   Neurological: Negative for light-headedness, numbness and headaches.       Physical Exam     Initial Vitals [11/15/20 1136]   BP Pulse Resp Temp SpO2   (!) 161/93 102 18 97.7 °F (36.5 °C) 100 %      MAP       --         Physical Exam    Constitutional: He appears well-developed and well-nourished. He appears ill.   HENT:   Head: Normocephalic and atraumatic.   Eyes: EOM are normal. Pupils are equal, round, and reactive to light.   Neck: Normal range of motion. Neck supple.   Cardiovascular: Normal rate, regular rhythm and normal heart sounds.   Pulmonary/Chest: Breath sounds normal. No respiratory distress.   Abdominal: He exhibits distension. There is abdominal tenderness. There is guarding.   Exam limited due to tenderness   Musculoskeletal: Normal range of motion.   Neurological: He is alert and oriented to person, place, and time. He has normal strength.   Skin: Skin is warm.         ED Course   Procedures  Labs Reviewed   CBC W/ AUTO DIFFERENTIAL - Abnormal; Notable for the following components:       Result Value    RBC 4.00 (*)     Hemoglobin 11.1 (*)     Hematocrit 35.8 (*)     MCHC 31.0 (*)     Gran % 78.7 (*)     Lymph % 12.9 (*)     All other components within normal limits   COMPREHENSIVE METABOLIC PANEL - Abnormal; Notable for the following components:    Potassium 3.3 (*)     Glucose 114 (*)     Albumin 2.7 (*)     All other components within normal limits   URINALYSIS, REFLEX TO URINE CULTURE - Abnormal; Notable for the following components:    Ketones, UA Trace (*)     All other components within normal limits    Narrative:     Specimen Source->Urine   LIPASE   LACTIC ACID, PLASMA   SARS-COV-2 RNA AMPLIFICATION, QUAL           Imaging Results          CT Abdomen Pelvis With Contrast (Final result)  Result time 11/15/20 13:44:41    Final result by Poli Delgadillo MD (11/15/20 13:44:41)                 Impression:      Postoperative change of right lower quadrant end ileostomy with recent surgical correction of small-bowel obstruction.  Redemonstration of a fluid collection with multiple foci of air measuring up to 12.6 cm in the anterior abdominal wall extending through the ileostomy wall defect, concerning for intra-abdominal abscess.  This collection appears overall unchanged compared to prior exam 11/13/2020.    Small volume intraperitoneal air consistent with recent operation.    Stable diffuse mesenteric edema and trace free fluid in the lower abdomen.    Additional stable findings as above.    Electronically signed by resident: Brijesh Cantu  Date:    11/15/2020  Time:    13:09    Electronically signed by: Poli Delgadillo MD  Date:    11/15/2020  Time:    13:44             Narrative:    EXAMINATION:  CT ABDOMEN PELVIS WITH CONTRAST    CLINICAL HISTORY:  Bowel obstruction high-grade suspected;    TECHNIQUE:  Low-dose, axial CT images of the abdomen and pelvis were obtained after the administration of 100 cc of Omnipaque 350 intravenous contrast material. No oral contrast was administered.  Coronal and sagittal reformations were provided for review.    COMPARISON:  CT abdomen pelvis with contrast 11/13/2020, 09/25/2020.    FINDINGS:  Heart: No cardiac enlargement or pericardial effusion.    Lung Bases: Bibasilar dependent atelectasis.  Bandlike opacities in the right lower lobe compatible with subsegmental atelectasis.  Trace pleural thickening and small right pleural effusion similar to prior.  No pneumothorax.    Liver: Normal in size and attenuation without focal hepatic abnormality.    Gallbladder: Normal appearance without evidence for cholecystitis.    Bile Ducts: Minimal intrahepatic biliary ductal dilatation.  Extrahepatic  common bile duct is dilated measuring 1.1 cm in diameter, similar to prior.    Pancreas: No pancreatic mass lesion or peripancreatic inflammatory change. The pancreas is atrophic.    Spleen: Splenule noted.    Adrenals: Unremarkable.    Genitourinary: Normal in size and location.  Multiple hypodensities in the kidneys, stable compared to prior.  No nephrolithiasis or hydroureteronephrosis.  Ureters appear normal in course and caliber.  Bladder demonstrates smooth contours without bladder wall thickening.    Reproductive organs: Unremarkable.    GI tract/Mesentery: Stomach and distal esophagus are normal. Postoperative change of proctocolectomy with right lower and ileostomy and reported recent surgical correction of small-bowel obstruction.  Redemonstration of a 12.6 x 2.1 cm fluid collection with multiple foci of air along the anterior abdominal wall, concerning for abscess.  There is extension of this fluid collection along the ileostomy tract in the anterior subcutaneous soft tissues, similar to prior.  There is persistent surrounding mesenteric edema in the lower abdomen.  No definite obstruction within the visualized loops of the small and large bowel.    Peritoneal Space: Scattered foci of intraperitoneal air consistent with recent operation.  Trace free fluid along with previously mentioned mesenteric edema.    Retroperitoneum: No significant adenopathy.    Abdominal wall: Diffuse subcutaneous edema along the bilateral flanks similar to prior.  Postoperative scar in the anterior abdominal wall.  Right lower quadrant ileostomy with adjacent fluid collection as described above.  Fluid containing right inguinal hernia    Vasculature: Abdominal aorta is normal in caliber with mild calcific atherosclerosis.    Bones: Degenerative changes to the spine without acute fracture or bony destructive process.                                 Medical Decision Making:   Initial Assessment:   70 y.o. male that presents to the  ED for abdominal distention, pain, constipation since a hernia repair on 11/9. His pain has worsened since his he was seen in the ED 2 days ago. On exam, abdomen is very tender and he has chills. Minimal output in ostomy bag.  ED Management:  2:17 PM  Patient seen and examined with Dr. Ocasio. Morphine and anti-emetic given. CT abd/pelv ordered showing possible abdominal abscess. Colorectal surgery consulted for further evaluation. Patient admitted to observation under CRS service in stable condition.                             Clinical Impression:     ICD-10-CM ICD-9-CM   1. Abdominal pain  R10.9 789.00                      Disposition:   Disposition: Admitted  Condition: Stable     ED Disposition Condition    Observation                             Melonie Linares MD  Resident  11/15/20 [id number]

## 2020-11-15 NOTE — ED NOTES
Report given to Emelia on 10th floor -----------------------------------------------going to room 1002

## 2020-11-15 NOTE — SUBJECTIVE & OBJECTIVE
(Not in a hospital admission)      Review of patient's allergies indicates:  No Known Allergies    Past Medical History:   Diagnosis Date    Basal cell carcinoma     BPH (benign prostatic hyperplasia)     s/p TURP    Carotid stenosis     Chronic kidney disease     Claudication     Coronary artery disease     DDD (degenerative disc disease) 10/21/2013    Diabetes mellitus with renal complications     Disc disease, degenerative, cervical     Encounter for blood transfusion     GERD (gastroesophageal reflux disease)     Gout, chronic     History of ulcerative colitis     s/p colectomy and ileostomy    HLD (hyperlipidemia)     HTN (hypertension)     Ileostomy in place 1982    RBBB     Squamous cell carcinoma 03/08/2018    Left superior helix near insertion    Squamous cell carcinoma 04/12/2018    Left forearm x 5    Ventricular tachycardia      Past Surgical History:   Procedure Laterality Date    cardiac stents      CATARACT EXTRACTION Bilateral     CERVICAL FUSION      colectomy and ileostomy  1985    LYSIS OF ADHESIONS N/A 11/9/2020    Procedure: LYSIS, ADHESIONS,  ERAS low;  Surgeon: CED Haley MD;  Location: [facility] OR [address] [place] [address];  Service: Colon and Rectal;  Laterality: N/A;    REPAIR, HERNIA, PARASTOMAL N/A 11/9/2020    Procedure: REPAIR, HERNIA, PARASTOMAL;  Surgeon: CED Haley MD;  Location: [facility] OR [address] [place] [address];  Service: Colon and Rectal;  Laterality: N/A;    TRANSURETHRAL RESECTION OF PROSTATE (TURP) WITHOUT USE OF LASER N/A 1/23/2019    Procedure: TURP, WITHOUT USING LASER BIPOLAR;  Surgeon: Catarino Mota MD;  Location: [facility] OR [address] [place] [address];  Service: Urology;  Laterality: N/A;  1.5 HOURS     Family History     Problem Relation (Age of Onset)    Cancer Father    Dementia Mother    Diabetes Father        Tobacco Use    Smoking status: Never Smoker    Smokeless tobacco: Never Used   Substance and Sexual Activity    Alcohol use: No    Drug use: No    Sexual activity: Not  Currently     Partners: Female     Review of Systems   Constitutional: Negative for chills and fever.   Respiratory: Negative for chest tightness and shortness of breath.    Cardiovascular: Negative for chest pain.   Gastrointestinal: Positive for abdominal distention, abdominal pain and constipation. Negative for diarrhea, nausea and vomiting.   Genitourinary: Negative for dysuria.   Musculoskeletal: Negative for back pain.   Skin: Negative for rash.   Neurological: Negative for syncope.   Psychiatric/Behavioral: Negative for agitation and confusion.     Objective:     Vital Signs (Most Recent):  Temp: 97.7 °F (36.5 °C) (11/15/20 1136)  Pulse: 102 (11/15/20 1136)  Resp: 20 (11/15/20 1239)  BP: (!) 161/93 (11/15/20 1136)  SpO2: 100 % (11/15/20 1136) Vital Signs (24h Range):  Temp:  [97.7 °F (36.5 °C)] 97.7 °F (36.5 °C)  Pulse:  [102] 102  Resp:  [18-20] 20  SpO2:  [100 %] 100 %  BP: (161)/(93) 161/93        There is no height or weight on file to calculate BMI.    Physical Exam  Constitutional:       General: He is not in acute distress.  Cardiovascular:      Rate and Rhythm: Normal rate.   Pulmonary:      Effort: Pulmonary effort is normal. No respiratory distress.   Abdominal:      General: There is no distension.      Palpations: Abdomen is soft.      Tenderness: There is abdominal tenderness (Appropriately tender around incision and illeostomy). There is no guarding or rebound.      Hernia: No hernia is present.      Comments: Prevena vac removed.  No signs of infection at incision    Neurological:      General: No focal deficit present.      Mental Status: He is alert.   Psychiatric:         Mood and Affect: Mood normal.         Significant Labs:  CBC:   Recent Labs   Lab 11/15/20  1240   WBC 7.54   RBC 4.00*   HGB 11.1*   HCT 35.8*      MCV 90   MCH 27.8   MCHC 31.0*     CMP:   Recent Labs   Lab 11/15/20  1240   *   CALCIUM 8.7   ALBUMIN 2.7*   PROT 6.1      K 3.3*   CO2 25      BUN  12   CREATININE 0.9   ALKPHOS 111   ALT 14   AST 15   BILITOT 0.8     Lactic 1.4    Significant Diagnostics:  Reviewed.  Unchanged from prior scan

## 2020-11-15 NOTE — PROVIDER PROGRESS NOTES - EMERGENCY DEPT.
Encounter Date: 11/15/2020    ED Physician Progress Notes        Physician Note:   Assumed care of patient at sign out pending CRS recommendations.  Admitted to CRS service for further management.

## 2020-11-15 NOTE — ASSESSMENT & PLAN NOTE
Will place in Obs  Clear liquids and IVF hydration  Home meds  Will review imaging with staff tomorrow and discuss role for drainage of seroma  No need for abx, no signs of infection  Possible SBFT tomorrow as well  Discussed with Dr Kulkarni

## 2020-11-15 NOTE — CONSULTS
[facility]  Colorectal Surgery  Consult Note    Patient Name: Jarrett Charlton Jr.  MRN: [id number]  Admission Date: 11/15/2020  Hospital Length of Stay: 0 days  Attending Physician: Scott Ocasio MD  Primary Care Provider: Poli Gonzalez MD    Inpatient consult to Colorectal Surgery  Consult performed by: Dieter Hutchins MD  Consult ordered by: Scott Ocasio MD        Subjective:     Chief Complaint/Reason for Admission: Abdominal pain     History of Present Illness:  Jarrett Charlton Jr. is a 70 y.o. M with hx of UC s/p total proctocolectomy and now 6 days post op from ex lap, lysis of adhesions, parastomal hernia repair with mesh (sugarbaker) who presents to the ED with bloating and decreased ileostomy output.  He had presented 2 nights ago for the same.  He states that he is afraid to eat.  Having pain around his ileostomy site.  In the ED, labs grossly unremarkable.  CT scan shows no signs of obstruction, fluid collection around mesh unchanged from previous scan.  CRS was consulted for evaluation     (Not in a hospital admission)      Review of patient's allergies indicates:  No Known Allergies    Past Medical History:   Diagnosis Date    Basal cell carcinoma     BPH (benign prostatic hyperplasia)     s/p TURP    Carotid stenosis     Chronic kidney disease     Claudication     Coronary artery disease     DDD (degenerative disc disease) 10/21/2013    Diabetes mellitus with renal complications     Disc disease, degenerative, cervical     Encounter for blood transfusion     GERD (gastroesophageal reflux disease)     Gout, chronic     History of ulcerative colitis     s/p colectomy and ileostomy    HLD (hyperlipidemia)     HTN (hypertension)     Ileostomy in place 1982    RBBB     Squamous cell carcinoma 03/08/2018    Left superior helix near insertion    Squamous cell carcinoma 04/12/2018    Left forearm x 5    Ventricular tachycardia      Past Surgical History:    Procedure Laterality Date    cardiac stents      CATARACT EXTRACTION Bilateral     CERVICAL FUSION      colectomy and ileostomy  1985    LYSIS OF ADHESIONS N/A 11/9/2020    Procedure: LYSIS, ADHESIONS,  ERAS low;  Surgeon: CED Haley MD;  Location: [facility] OR 2ND FLR;  Service: Colon and Rectal;  Laterality: N/A;    REPAIR, HERNIA, PARASTOMAL N/A 11/9/2020    Procedure: REPAIR, HERNIA, PARASTOMAL;  Surgeon: CED Haley MD;  Location: [facility] OR 2ND FLR;  Service: Colon and Rectal;  Laterality: N/A;    TRANSURETHRAL RESECTION OF PROSTATE (TURP) WITHOUT USE OF LASER N/A 1/23/2019    Procedure: TURP, WITHOUT USING LASER BIPOLAR;  Surgeon: Catarino Mota MD;  Location: [facility] OR 1ST FLR;  Service: Urology;  Laterality: N/A;  1.5 HOURS     Family History     Problem Relation (Age of Onset)    Cancer Father    Dementia Mother    Diabetes Father        Tobacco Use    Smoking status: Never Smoker    Smokeless tobacco: Never Used   Substance and Sexual Activity    Alcohol use: No    Drug use: No    Sexual activity: Not Currently     Partners: Female     Review of Systems   Constitutional: Negative for chills and fever.   Respiratory: Negative for chest tightness and shortness of breath.    Cardiovascular: Negative for chest pain.   Gastrointestinal: Positive for abdominal distention, abdominal pain and constipation. Negative for diarrhea, nausea and vomiting.   Genitourinary: Negative for dysuria.   Musculoskeletal: Negative for back pain.   Skin: Negative for rash.   Neurological: Negative for syncope.   Psychiatric/Behavioral: Negative for agitation and confusion.     Objective:     Vital Signs (Most Recent):  Temp: 97.7 °F (36.5 °C) (11/15/20 1136)  Pulse: 102 (11/15/20 1136)  Resp: 20 (11/15/20 1239)  BP: (!) 161/93 (11/15/20 1136)  SpO2: 100 % (11/15/20 1136) Vital Signs (24h Range):  Temp:  [97.7 °F (36.5 °C)] 97.7 °F (36.5 °C)  Pulse:  [102] 102  Resp:  [18-20] 20  SpO2:  [100 %] 100 %  BP:  (161)/(93) 161/93        There is no height or weight on file to calculate BMI.    Physical Exam  Constitutional:       General: He is not in acute distress.  Cardiovascular:      Rate and Rhythm: Normal rate.   Pulmonary:      Effort: Pulmonary effort is normal. No respiratory distress.   Abdominal:      General: There is no distension.      Palpations: Abdomen is soft.      Tenderness: There is abdominal tenderness (Appropriately tender around incision and illeostomy). There is no guarding or rebound.      Hernia: No hernia is present.      Comments: Prevena vac removed.  No signs of infection at incision    Neurological:      General: No focal deficit present.      Mental Status: He is alert.   Psychiatric:         Mood and Affect: Mood normal.         Significant Labs:  CBC:   Recent Labs   Lab 11/15/20  1240   WBC 7.54   RBC 4.00*   HGB 11.1*   HCT 35.8*      MCV 90   MCH 27.8   MCHC 31.0*     CMP:   Recent Labs   Lab 11/15/20  1240   *   CALCIUM 8.7   ALBUMIN 2.7*   PROT 6.1      K 3.3*   CO2 25      BUN 12   CREATININE 0.9   ALKPHOS 111   ALT 14   AST 15   BILITOT 0.8     Lactic 1.4    Significant Diagnostics:  Reviewed.  Unchanged from prior scan     Assessment/Plan:     * Abdominal pain  Will place in Obs  Clear liquids and IVF hydration  Home meds  Will review imaging with staff tomorrow and discuss role for drainage of seroma  No need for abx, no signs of infection  Possible SBFT tomorrow as well  Discussed with Dr Kulkarni         Thank you for your consult.      Dieter Hutchins MD  Colorectal Surgery  [facility]    Discussed with Dr Hutchins  No signs of sepsis or SBO  Pt may benefit from SBFT to assess   Agree with observation status

## 2020-11-16 ENCOUNTER — TELEPHONE (OUTPATIENT)
Dept: SURGERY | Facility: CLINIC | Age: 70
End: 2020-11-16

## 2020-11-16 PROBLEM — Z98.890 STATUS POST REPAIR OF VENTRAL HERNIA: Status: ACTIVE | Noted: 2020-11-16

## 2020-11-16 PROBLEM — Z87.19 STATUS POST REPAIR OF VENTRAL HERNIA: Status: ACTIVE | Noted: 2020-11-16

## 2020-11-16 LAB
ANION GAP SERPL CALC-SCNC: 10 MMOL/L (ref 8–16)
BASOPHILS # BLD AUTO: 0.01 K/UL (ref 0–0.2)
BASOPHILS NFR BLD: 0.1 % (ref 0–1.9)
BUN SERPL-MCNC: 10 MG/DL (ref 8–23)
CALCIUM SERPL-MCNC: 8.3 MG/DL (ref 8.7–10.5)
CHLORIDE SERPL-SCNC: 105 MMOL/L (ref 95–110)
CO2 SERPL-SCNC: 27 MMOL/L (ref 23–29)
CREAT SERPL-MCNC: 0.9 MG/DL (ref 0.5–1.4)
CRP SERPL-MCNC: 21.4 MG/L (ref 0–8.2)
DIFFERENTIAL METHOD: ABNORMAL
EOSINOPHIL # BLD AUTO: 0.2 K/UL (ref 0–0.5)
EOSINOPHIL NFR BLD: 3.3 % (ref 0–8)
ERYTHROCYTE [DISTWIDTH] IN BLOOD BY AUTOMATED COUNT: 14.6 % (ref 11.5–14.5)
EST. GFR  (AFRICAN AMERICAN): >60 ML/MIN/1.73 M^2
EST. GFR  (NON AFRICAN AMERICAN): >60 ML/MIN/1.73 M^2
GLUCOSE SERPL-MCNC: 103 MG/DL (ref 70–110)
HCT VFR BLD AUTO: 33.9 % (ref 40–54)
HGB BLD-MCNC: 10.5 G/DL (ref 14–18)
IMM GRANULOCYTES # BLD AUTO: 0.05 K/UL (ref 0–0.04)
IMM GRANULOCYTES NFR BLD AUTO: 0.7 % (ref 0–0.5)
LYMPHOCYTES # BLD AUTO: 1.2 K/UL (ref 1–4.8)
LYMPHOCYTES NFR BLD: 16.5 % (ref 18–48)
MCH RBC QN AUTO: 27.8 PG (ref 27–31)
MCHC RBC AUTO-ENTMCNC: 31 G/DL (ref 32–36)
MCV RBC AUTO: 90 FL (ref 82–98)
MONOCYTES # BLD AUTO: 0.5 K/UL (ref 0.3–1)
MONOCYTES NFR BLD: 6.6 % (ref 4–15)
NEUTROPHILS # BLD AUTO: 5.1 K/UL (ref 1.8–7.7)
NEUTROPHILS NFR BLD: 72.8 % (ref 38–73)
NRBC BLD-RTO: 0 /100 WBC
PLATELET # BLD AUTO: 164 K/UL (ref 150–350)
PMV BLD AUTO: 10.7 FL (ref 9.2–12.9)
POTASSIUM SERPL-SCNC: 3.9 MMOL/L (ref 3.5–5.1)
RBC # BLD AUTO: 3.78 M/UL (ref 4.6–6.2)
SODIUM SERPL-SCNC: 142 MMOL/L (ref 136–145)
WBC # BLD AUTO: 6.98 K/UL (ref 3.9–12.7)

## 2020-11-16 PROCEDURE — 94761 N-INVAS EAR/PLS OXIMETRY MLT: CPT

## 2020-11-16 PROCEDURE — 86140 C-REACTIVE PROTEIN: CPT

## 2020-11-16 PROCEDURE — G0378 HOSPITAL OBSERVATION PER HR: HCPCS

## 2020-11-16 PROCEDURE — 85025 COMPLETE CBC W/AUTO DIFF WBC: CPT

## 2020-11-16 PROCEDURE — 96361 HYDRATE IV INFUSION ADD-ON: CPT

## 2020-11-16 PROCEDURE — 36415 COLL VENOUS BLD VENIPUNCTURE: CPT

## 2020-11-16 PROCEDURE — 25000003 PHARM REV CODE 250: Performed by: STUDENT IN AN ORGANIZED HEALTH CARE EDUCATION/TRAINING PROGRAM

## 2020-11-16 PROCEDURE — 20600001 HC STEP DOWN PRIVATE ROOM

## 2020-11-16 PROCEDURE — 80048 BASIC METABOLIC PNL TOTAL CA: CPT

## 2020-11-16 RX ADMIN — TAMSULOSIN HYDROCHLORIDE 0.4 MG: 0.4 CAPSULE ORAL at 09:11

## 2020-11-16 RX ADMIN — OXYCODONE HYDROCHLORIDE 5 MG: 5 TABLET ORAL at 06:11

## 2020-11-16 RX ADMIN — ACETAMINOPHEN 650 MG: 325 TABLET ORAL at 09:11

## 2020-11-16 RX ADMIN — CARVEDILOL 12.5 MG: 12.5 TABLET, FILM COATED ORAL at 09:11

## 2020-11-16 RX ADMIN — SIMETHICONE 80 MG: 80 TABLET, CHEWABLE ORAL at 05:11

## 2020-11-16 RX ADMIN — OXYCODONE HYDROCHLORIDE 5 MG: 5 TABLET ORAL at 11:11

## 2020-11-16 RX ADMIN — OXYCODONE HYDROCHLORIDE 5 MG: 5 TABLET ORAL at 12:11

## 2020-11-16 RX ADMIN — AMLODIPINE BESYLATE 5 MG: 5 TABLET ORAL at 09:11

## 2020-11-16 RX ADMIN — GABAPENTIN 600 MG: 300 CAPSULE ORAL at 09:11

## 2020-11-16 NOTE — PROGRESS NOTES
[facility]  General Surgery  Progress Note    Subjective:     History of Present Illness:  Jarrett Charlton Jr. is a 70 y.o. M with hx of UC s/p total proctocolectomy and now 6 days post op from ex lap, lysis of adhesions, parastomal hernia repair with mesh (sugarbaker) who presents to the ED with bloating and decreased ileostomy output.  He had presented 2 nights ago for the same.  He states that he is afraid to eat.  Having pain around his ileostomy site.  In the ED, labs grossly unremarkable.  CT scan shows no signs of obstruction, fluid collection around mesh unchanged from previous scan.  CRS was consulted for evaluation     Post-Op Info:  * No surgery found *         Interval History: NAEON. NPO with some ostomy output, small amount of soft stool as well as gas within appliance this am. CT yesterday demonstrating seroma at level of prior mesh placement.     Medications:  Continuous Infusions:   lactated ringers 100 mL/hr at 11/15/20 1715     Scheduled Meds:   amLODIPine  5 mg Oral Daily    carvediloL  12.5 mg Oral BID    gabapentin  600 mg Oral BID    tamsulosin  0.4 mg Oral Daily     PRN Meds:acetaminophen, ondansetron, oxyCODONE, simethicone, sodium chloride 0.9%     Review of patient's allergies indicates:  No Known Allergies  Objective:     Vital Signs (Most Recent):  Temp: 98.4 °F (36.9 °C) (11/16/20 0440)  Pulse: 83 (11/16/20 0440)  Resp: 18 (11/16/20 0440)  BP: 133/72 (11/16/20 0440)  SpO2: 97 % (11/16/20 0440) Vital Signs (24h Range):  Temp:  [97.6 °F (36.4 °C)-98.4 °F (36.9 °C)] 98.4 °F (36.9 °C)  Pulse:  [] 83  Resp:  [16-20] 18  SpO2:  [97 %-100 %] 97 %  BP: (133-161)/(72-93) 133/72        There is no height or weight on file to calculate BMI.    Intake/Output - Last 3 Shifts       11/14 0700 - 11/15 0659 11/15 0700 - 11/16 0659 11/16 0700 - 11/17 0659    P.O.  0     Total Intake  0     Urine  800     Emesis/NG output  0     Other  0     Stool  0     Blood  0     Total  Output  800     Net  -800            Urine Occurrence  0 x     Stool Occurrence  0 x     Emesis Occurrence  0 x           Physical Exam  Vitals signs and nursing note reviewed.   Constitutional:       Appearance: Normal appearance.   HENT:      Head: Normocephalic and atraumatic.   Cardiovascular:      Rate and Rhythm: Normal rate and regular rhythm.   Pulmonary:      Effort: Pulmonary effort is normal. No respiratory distress.   Abdominal:      General: Abdomen is flat. There is no distension.      Palpations: Abdomen is soft.      Tenderness: There is no abdominal tenderness.      Comments: Midline surgical incision healing well  RLQ ostomy with soft stool, gas within appliance    Musculoskeletal:      Right lower leg: No edema.      Left lower leg: No edema.   Skin:     General: Skin is warm and dry.   Neurological:      General: No focal deficit present.      Mental Status: He is alert and oriented to person, place, and time.   Psychiatric:         Mood and Affect: Mood normal.         Behavior: Behavior normal.         Significant Labs:  CBC:   Recent Labs   Lab 11/16/20  0651   WBC 6.98   RBC 3.78*   HGB 10.5*   HCT 33.9*      MCV 90   MCH 27.8   MCHC 31.0*     CMP:   Recent Labs   Lab 11/15/20  1240 11/16/20  0651   * 103   CALCIUM 8.7 8.3*   ALBUMIN 2.7*  --    PROT 6.1  --     142   K 3.3* 3.9   CO2 25 27    105   BUN 12 10   CREATININE 0.9 0.9   ALKPHOS 111  --    ALT 14  --    AST 15  --    BILITOT 0.8  --        Significant Diagnostics:  CT 11/15:  Impression:     Postoperative change of right lower quadrant end ileostomy with recent surgical correction of small-bowel obstruction.  Redemonstration of a fluid collection with multiple foci of air measuring up to 12.6 cm in the anterior abdominal wall extending through the ileostomy wall defect, concerning for intra-abdominal abscess.  This collection appears overall unchanged compared to prior exam 11/13/2020.     Small volume  intraperitoneal air consistent with recent operation.     Stable diffuse mesenteric edema and trace free fluid in the lower abdomen    Assessment/Plan:     Status post repair of ventral hernia  70 y.o. M with hx of UC s/p total proctocolectomy and now s/p ex lap, lysis of adhesions, parastomal hernia repair with mesh (11/09) who presented to ED 11/13 with abdominal pain and decreased ostomy output. Admitted for observation and evaluation.    - NPO with IVF hydration  - Multimodal pain control   - Ostomy with some function this am   - Will discuss results of CT and role of drainage with staff  - Continue home meds   - DVT: KAREN hose       Dispo: continue floor care           Fatuma Zapien MD  General Surgery  [facility]

## 2020-11-16 NOTE — ASSESSMENT & PLAN NOTE
70 y.o. M with hx of UC s/p total proctocolectomy and now s/p ex lap, lysis of adhesions, parastomal hernia repair with mesh (11/09) who presented to ED 11/13 with abdominal pain and decreased ostomy output. Admitted for observation and evaluation.    - NPO with IVF hydration  - Multimodal pain control   - Ostomy with some function this am   - Will discuss results of CT and role of drainage with staff  - Continue home meds   - DVT: KAREN hose       Dispo: continue floor care

## 2020-11-16 NOTE — PLAN OF CARE
Admit Date:  11/15/2020 11:47 AM      Admit Diagnosis:  Abdominal pain [R10.9]      CM met with patient in room for Discharge Planning Assessment. Per patient, he lives alone in a mobile home with a ramp to enter.   Per patient, he was independent with ADLS and did not use DME for ambulation.  Patient will have assistance from daughter upon discharge. Patient states he has [employer] and does not wish to resume services. Patient's physical address is [address] [place] [address].  Discharge Planning Booklet given to patient and discussed.  All questions addressed.  CM will follow for needs.     11/16/20 1328   Discharge Assessment   Assessment Type Discharge Planning Assessment   Confirmed/corrected address and phone number on facesheet? Yes   Assessment information obtained from? Patient   Expected Length of Stay (days) 4   Communicated expected length of stay with patient/caregiver yes   Prior to hospitilization cognitive status: Alert/Oriented   Prior to hospitalization functional status: Independent   Current cognitive status: Alert/Oriented   Current Functional Status: Independent   Lives With alone   Able to Return to Prior Arrangements yes   Is patient able to care for self after discharge? Yes   Patient's perception of discharge disposition home or selfcare   Readmission Within the Last 30 Days no previous admission in last 30 days   Patient currently being followed by outpatient case management? No   Patient currently receives any other outside agency services? Yes   Name and contact number of agency or person providing outside services [facility]   Is it the patient/care giver preference to resume care with the current outside agency? No   Equipment Currently Used at Home none   Do you have any problems affording any of your prescribed medications? No   Is the patient taking medications as prescribed? yes   Does the patient have transportation home? Yes   Transportation  Anticipated family or friend will provide   Does the patient receive services at the [facility]? No   Discharge Plan A Home   Discharge Plan B Home   DME Needed Upon Discharge  none   Patient/Family in Agreement with Plan yes            PCP:  Poli Gonzalez MD  [phone]        Pharmacy:    [facility] - [place], [place] - [address] [facility]  [address] [facility] [address]  Phone: [phone] Fax: [phone]    [address] [place] - [address] [facility]  [address] [facility] [place] [address]  Phone: [phone] Fax: [phone]        Emergency Contacts:  Extended Emergency Contact Information  Primary Emergency Contact: Latisha Charlton  [place] Phone: [phone]  Mobile Phone: [phone]  Relation: Daughter      Insurance:    Payor: [employer] MANAGED MEDICARE / Plan: [employer] MEDICARE PPO / Product Type: Medicare Advantage /       11/16/2020  1:45 PM      Eva Bland RN, CM   Ext: [phone]

## 2020-11-16 NOTE — SUBJECTIVE & OBJECTIVE
Interval History: NAEON. NPO with some ostomy output, small amount of soft stool as well as gas within appliance this am. CT yesterday demonstrating seroma at level of prior mesh placement.     Medications:  Continuous Infusions:   lactated ringers 100 mL/hr at 11/15/20 1715     Scheduled Meds:   amLODIPine  5 mg Oral Daily    carvediloL  12.5 mg Oral BID    gabapentin  600 mg Oral BID    tamsulosin  0.4 mg Oral Daily     PRN Meds:acetaminophen, ondansetron, oxyCODONE, simethicone, sodium chloride 0.9%     Review of patient's allergies indicates:  No Known Allergies  Objective:     Vital Signs (Most Recent):  Temp: 98.4 °F (36.9 °C) (11/16/20 0440)  Pulse: 83 (11/16/20 0440)  Resp: 18 (11/16/20 0440)  BP: 133/72 (11/16/20 0440)  SpO2: 97 % (11/16/20 0440) Vital Signs (24h Range):  Temp:  [97.6 °F (36.4 °C)-98.4 °F (36.9 °C)] 98.4 °F (36.9 °C)  Pulse:  [] 83  Resp:  [16-20] 18  SpO2:  [97 %-100 %] 97 %  BP: (133-161)/(72-93) 133/72        There is no height or weight on file to calculate BMI.    Intake/Output - Last 3 Shifts       11/14 0700 - 11/15 0659 11/15 0700 - 11/16 0659 11/16 0700 - 11/17 0659    P.O.  0     Total Intake  0     Urine  800     Emesis/NG output  0     Other  0     Stool  0     Blood  0     Total Output  800     Net  -800            Urine Occurrence  0 x     Stool Occurrence  0 x     Emesis Occurrence  0 x           Physical Exam  Vitals signs and nursing note reviewed.   Constitutional:       Appearance: Normal appearance.   HENT:      Head: Normocephalic and atraumatic.   Cardiovascular:      Rate and Rhythm: Normal rate and regular rhythm.   Pulmonary:      Effort: Pulmonary effort is normal. No respiratory distress.   Abdominal:      General: Abdomen is flat. There is no distension.      Palpations: Abdomen is soft.      Tenderness: There is no abdominal tenderness.      Comments: Midline surgical incision healing well  RLQ ostomy with soft stool, gas within appliance     Musculoskeletal:      Right lower leg: No edema.      Left lower leg: No edema.   Skin:     General: Skin is warm and dry.   Neurological:      General: No focal deficit present.      Mental Status: He is alert and oriented to person, place, and time.   Psychiatric:         Mood and Affect: Mood normal.         Behavior: Behavior normal.         Significant Labs:  CBC:   Recent Labs   Lab 11/16/20  0651   WBC 6.98   RBC 3.78*   HGB 10.5*   HCT 33.9*      MCV 90   MCH 27.8   MCHC 31.0*     CMP:   Recent Labs   Lab 11/15/20  1240 11/16/20  0651   * 103   CALCIUM 8.7 8.3*   ALBUMIN 2.7*  --    PROT 6.1  --     142   K 3.3* 3.9   CO2 25 27    105   BUN 12 10   CREATININE 0.9 0.9   ALKPHOS 111  --    ALT 14  --    AST 15  --    BILITOT 0.8  --        Significant Diagnostics:  CT 11/15:  Impression:     Postoperative change of right lower quadrant end ileostomy with recent surgical correction of small-bowel obstruction.  Redemonstration of a fluid collection with multiple foci of air measuring up to 12.6 cm in the anterior abdominal wall extending through the ileostomy wall defect, concerning for intra-abdominal abscess.  This collection appears overall unchanged compared to prior exam 11/13/2020.     Small volume intraperitoneal air consistent with recent operation.     Stable diffuse mesenteric edema and trace free fluid in the lower abdomen

## 2020-11-16 NOTE — PLAN OF CARE
Pt AAOX4. POC reviewed with pt who verbalized understanding. VSS on RA. Pain controlled with Prn meds per mar. Colostomy c/d/i with minimum output. Midline on abd. C/d/i DB CRISTI. Pt has been NPO since midnight. No complaints of N/V. UO adequate. No adverse s/s noted, pt left in comfort with call light within reach. TM

## 2020-11-17 LAB — TROPONIN I SERPL DL<=0.01 NG/ML-MCNC: 0.02 NG/ML (ref 0–0.03)

## 2020-11-17 PROCEDURE — 84484 ASSAY OF TROPONIN QUANT: CPT

## 2020-11-17 PROCEDURE — 93005 ELECTROCARDIOGRAM TRACING: CPT

## 2020-11-17 PROCEDURE — 93010 EKG 12-LEAD: ICD-10-PCS | Mod: ,,, | Performed by: INTERNAL MEDICINE

## 2020-11-17 PROCEDURE — 93010 ELECTROCARDIOGRAM REPORT: CPT | Mod: ,,, | Performed by: INTERNAL MEDICINE

## 2020-11-17 PROCEDURE — 36415 COLL VENOUS BLD VENIPUNCTURE: CPT

## 2020-11-17 PROCEDURE — 63600175 PHARM REV CODE 636 W HCPCS: Performed by: SURGERY

## 2020-11-17 PROCEDURE — 20600001 HC STEP DOWN PRIVATE ROOM

## 2020-11-17 PROCEDURE — 25000003 PHARM REV CODE 250: Performed by: SURGERY

## 2020-11-17 PROCEDURE — 25000003 PHARM REV CODE 250: Performed by: STUDENT IN AN ORGANIZED HEALTH CARE EDUCATION/TRAINING PROGRAM

## 2020-11-17 RX ORDER — HYDROXYZINE PAMOATE 25 MG/1
25 CAPSULE ORAL ONCE
Status: COMPLETED | OUTPATIENT
Start: 2020-11-17 | End: 2020-11-17

## 2020-11-17 RX ORDER — SIMETHICONE 80 MG
1 TABLET,CHEWABLE ORAL
Status: DISCONTINUED | OUTPATIENT
Start: 2020-11-17 | End: 2020-11-30 | Stop reason: HOSPADM

## 2020-11-17 RX ORDER — TALC
6 POWDER (GRAM) TOPICAL NIGHTLY PRN
Status: DISCONTINUED | OUTPATIENT
Start: 2020-11-17 | End: 2020-11-30 | Stop reason: HOSPADM

## 2020-11-17 RX ORDER — ENOXAPARIN SODIUM 100 MG/ML
40 INJECTION SUBCUTANEOUS EVERY 24 HOURS
Status: DISCONTINUED | OUTPATIENT
Start: 2020-11-17 | End: 2020-11-30 | Stop reason: HOSPADM

## 2020-11-17 RX ORDER — METHOCARBAMOL 500 MG/1
500 TABLET, FILM COATED ORAL 3 TIMES DAILY PRN
Status: DISCONTINUED | OUTPATIENT
Start: 2020-11-17 | End: 2020-11-30 | Stop reason: HOSPADM

## 2020-11-17 RX ORDER — HYDROXYZINE PAMOATE 25 MG/1
25 CAPSULE ORAL ONCE
Status: DISCONTINUED | OUTPATIENT
Start: 2020-11-18 | End: 2020-11-17

## 2020-11-17 RX ADMIN — MELATONIN TAB 3 MG 6 MG: 3 TAB at 11:11

## 2020-11-17 RX ADMIN — SIMETHICONE CHEW TAB 80 MG 80 MG: 80 TABLET ORAL at 07:11

## 2020-11-17 RX ADMIN — TAMSULOSIN HYDROCHLORIDE 0.4 MG: 0.4 CAPSULE ORAL at 08:11

## 2020-11-17 RX ADMIN — CARVEDILOL 12.5 MG: 12.5 TABLET, FILM COATED ORAL at 08:11

## 2020-11-17 RX ADMIN — ENOXAPARIN SODIUM 40 MG: 40 INJECTION SUBCUTANEOUS at 04:11

## 2020-11-17 RX ADMIN — ACETAMINOPHEN 650 MG: 325 TABLET ORAL at 11:11

## 2020-11-17 RX ADMIN — GABAPENTIN 600 MG: 300 CAPSULE ORAL at 08:11

## 2020-11-17 RX ADMIN — SIMETHICONE CHEW TAB 80 MG 80 MG: 80 TABLET ORAL at 10:11

## 2020-11-17 RX ADMIN — ONDANSETRON 8 MG: 8 TABLET, ORALLY DISINTEGRATING ORAL at 05:11

## 2020-11-17 RX ADMIN — SIMETHICONE CHEW TAB 80 MG 80 MG: 80 TABLET ORAL at 04:11

## 2020-11-17 RX ADMIN — METHOCARBAMOL 500 MG: 500 TABLET ORAL at 08:11

## 2020-11-17 RX ADMIN — HYDROXYZINE PAMOATE 25 MG: 25 CAPSULE ORAL at 11:11

## 2020-11-17 RX ADMIN — OXYCODONE HYDROCHLORIDE 5 MG: 5 TABLET ORAL at 10:11

## 2020-11-17 RX ADMIN — AMLODIPINE BESYLATE 5 MG: 5 TABLET ORAL at 08:11

## 2020-11-17 RX ADMIN — OXYCODONE HYDROCHLORIDE 5 MG: 5 TABLET ORAL at 07:11

## 2020-11-17 NOTE — SUBJECTIVE & OBJECTIVE
"Interval History: NAEON. Tolerated full liquid diet, emptied ostomy bag twice. Liquid and gas within appliance this am. Complains of bloating and feeling subjectively "plugged up" but also acknowledges output.     Medications:  Continuous Infusions:  Scheduled Meds:   amLODIPine  5 mg Oral Daily    carvediloL  12.5 mg Oral BID    gabapentin  600 mg Oral BID    tamsulosin  0.4 mg Oral Daily     PRN Meds:acetaminophen, ondansetron, oxyCODONE, simethicone, sodium chloride 0.9%     Review of patient's allergies indicates:  No Known Allergies  Objective:     Vital Signs (Most Recent):  Temp: 98 °F (36.7 °C) (11/17/20 0507)  Pulse: 80 (11/17/20 0507)  Resp: 18 (11/17/20 0507)  BP: (!) 145/90 (11/17/20 0507)  SpO2: 95 % (11/17/20 0507) Vital Signs (24h Range):  Temp:  [97.6 °F (36.4 °C)-99.5 °F (37.5 °C)] 98 °F (36.7 °C)  Pulse:  [] 80  Resp:  [16-19] 18  SpO2:  [95 %-97 %] 95 %  BP: (130-152)/(76-91) 145/90        There is no height or weight on file to calculate BMI.    Intake/Output - Last 3 Shifts       11/15 0700 - 11/16 0659 11/16 0700 - 11/17 0659 11/17 0700 - 11/18 0659    P.O. 0 480     I.V.  605     Total Intake 0 1085     Urine 800 0     Emesis/NG output 0      Other 0      Stool 0 50     Blood 0      Total Output 800 50     Net -800 +1035            Urine Occurrence 0 x 1 x     Stool Occurrence 0 x      Emesis Occurrence 0 x            Physical Exam  Vitals signs and nursing note reviewed.   Constitutional:       Appearance: Normal appearance.   HENT:      Head: Normocephalic and atraumatic.   Cardiovascular:      Rate and Rhythm: Normal rate and regular rhythm.   Pulmonary:      Effort: Pulmonary effort is normal. No respiratory distress.   Abdominal:      Palpations: Abdomen is soft.      Tenderness: There is no abdominal tenderness.      Comments: Mild distention  RLQ ostomy with gas and liquid stool in appliance    Musculoskeletal:      Right lower leg: No edema.      Left lower leg: No edema. "   Neurological:      General: No focal deficit present.      Mental Status: He is alert and oriented to person, place, and time.   Psychiatric:         Mood and Affect: Mood normal.         Behavior: Behavior normal.         Significant Labs:  None    Significant Diagnostics:  I have reviewed all pertinent imaging results/findings within the past 24 hours.

## 2020-11-17 NOTE — ASSESSMENT & PLAN NOTE
70 y.o. M with hx of UC s/p total proctocolectomy and now s/p ex lap, lysis of adhesions, parastomal hernia repair with mesh (11/09) who presented to ED 11/13 with abdominal pain and decreased ostomy output. Admitted for observation and evaluation.    - Tolerating full liquid diet, will start low residue diet today for lunch  - adding simethicone for bloating/gas   - Multimodal pain control   - Ostomy with some function improving   - No role for drainage of post-operative seroma seen on CT  - Continue home meds   - DVT: KAREN hose, lovenox        Dispo: continue floor care, likely home tomorrow if still tolerating a diet and ostomy output continues to improve

## 2020-11-17 NOTE — PROGRESS NOTES
"[facility]  General Surgery  Progress Note    Subjective:     History of Present Illness:  Jarrett Charlton Jr. is a 70 y.o. M with hx of UC s/p total proctocolectomy and now 6 days post op from ex lap, lysis of adhesions, parastomal hernia repair with mesh (sugarbaker) who presents to the ED with bloating and decreased ileostomy output.  He had presented 2 nights ago for the same.  He states that he is afraid to eat.  Having pain around his ileostomy site.  In the ED, labs grossly unremarkable.  CT scan shows no signs of obstruction, fluid collection around mesh unchanged from previous scan.  CRS was consulted for evaluation     Post-Op Info:  * No surgery found *         Interval History: NAEON. Tolerated full liquid diet, emptied ostomy bag twice. Liquid and gas within appliance this am. Complains of bloating and feeling subjectively "plugged up" but also acknowledges output.     Medications:  Continuous Infusions:  Scheduled Meds:   amLODIPine  5 mg Oral Daily    carvediloL  12.5 mg Oral BID    gabapentin  600 mg Oral BID    tamsulosin  0.4 mg Oral Daily     PRN Meds:acetaminophen, ondansetron, oxyCODONE, simethicone, sodium chloride 0.9%     Review of patient's allergies indicates:  No Known Allergies  Objective:     Vital Signs (Most Recent):  Temp: 98 °F (36.7 °C) (11/17/20 0507)  Pulse: 80 (11/17/20 0507)  Resp: 18 (11/17/20 0507)  BP: (!) 145/90 (11/17/20 0507)  SpO2: 95 % (11/17/20 0507) Vital Signs (24h Range):  Temp:  [97.6 °F (36.4 °C)-99.5 °F (37.5 °C)] 98 °F (36.7 °C)  Pulse:  [] 80  Resp:  [16-19] 18  SpO2:  [95 %-97 %] 95 %  BP: (130-152)/(76-91) 145/90        There is no height or weight on file to calculate BMI.    Intake/Output - Last 3 Shifts       11/15 0700 - 11/16 0659 11/16 0700 - 11/17 0659 11/17 0700 - 11/18 0659    P.O. 0 480     I.V.  605     Total Intake 0 1085     Urine 800 0     Emesis/NG output 0      Other 0      Stool 0 50     Blood 0      Total Output " 800 50     Net -800 +1035            Urine Occurrence 0 x 1 x     Stool Occurrence 0 x      Emesis Occurrence 0 x            Physical Exam  Vitals signs and nursing note reviewed.   Constitutional:       Appearance: Normal appearance.   HENT:      Head: Normocephalic and atraumatic.   Cardiovascular:      Rate and Rhythm: Normal rate and regular rhythm.   Pulmonary:      Effort: Pulmonary effort is normal. No respiratory distress.   Abdominal:      Palpations: Abdomen is soft.      Tenderness: There is no abdominal tenderness.      Comments: Mild distention  RLQ ostomy with gas and liquid stool in appliance    Musculoskeletal:      Right lower leg: No edema.      Left lower leg: No edema.   Neurological:      General: No focal deficit present.      Mental Status: He is alert and oriented to person, place, and time.   Psychiatric:         Mood and Affect: Mood normal.         Behavior: Behavior normal.         Significant Labs:  None    Significant Diagnostics:  I have reviewed all pertinent imaging results/findings within the past 24 hours.    Assessment/Plan:     Status post repair of ventral hernia  70 y.o. M with hx of UC s/p total proctocolectomy and now s/p ex lap, lysis of adhesions, parastomal hernia repair with mesh (11/09) who presented to ED 11/13 with abdominal pain and decreased ostomy output. Admitted for observation and evaluation.    - Tolerating full liquid diet, will start low residue diet today for lunch  - adding simethicone for bloating/gas   - Multimodal pain control   - Ostomy with some function improving   - No role for drainage of post-operative seroma seen on CT  - Continue home meds   - DVT: KAREN hose, lovenox        Dispo: continue floor care, likely home tomorrow if still tolerating a diet and ostomy output continues to improve            Fatuma Zapien MD  General Surgery  [facility]

## 2020-11-17 NOTE — PLAN OF CARE
Pt resting quietly, resp even and unlabored, no SOB noted, tolerated meds well, no adverse reaction noted, minimal amount of green drainage present to ileostomy bag, site no s/s of infection, call light within reach

## 2020-11-18 PROCEDURE — 20600001 HC STEP DOWN PRIVATE ROOM

## 2020-11-18 PROCEDURE — 25000003 PHARM REV CODE 250: Performed by: STUDENT IN AN ORGANIZED HEALTH CARE EDUCATION/TRAINING PROGRAM

## 2020-11-18 PROCEDURE — 63600175 PHARM REV CODE 636 W HCPCS: Performed by: STUDENT IN AN ORGANIZED HEALTH CARE EDUCATION/TRAINING PROGRAM

## 2020-11-18 PROCEDURE — 25500020 PHARM REV CODE 255: Performed by: COLON & RECTAL SURGERY

## 2020-11-18 PROCEDURE — 63600175 PHARM REV CODE 636 W HCPCS: Performed by: SURGERY

## 2020-11-18 PROCEDURE — 25000003 PHARM REV CODE 250: Performed by: SURGERY

## 2020-11-18 RX ORDER — HYDROMORPHONE HYDROCHLORIDE 1 MG/ML
0.5 INJECTION, SOLUTION INTRAMUSCULAR; INTRAVENOUS; SUBCUTANEOUS EVERY 6 HOURS PRN
Status: DISCONTINUED | OUTPATIENT
Start: 2020-11-18 | End: 2020-11-19

## 2020-11-18 RX ORDER — SODIUM CHLORIDE, SODIUM LACTATE, POTASSIUM CHLORIDE, CALCIUM CHLORIDE 600; 310; 30; 20 MG/100ML; MG/100ML; MG/100ML; MG/100ML
INJECTION, SOLUTION INTRAVENOUS CONTINUOUS
Status: DISCONTINUED | OUTPATIENT
Start: 2020-11-18 | End: 2020-11-21

## 2020-11-18 RX ADMIN — OXYCODONE HYDROCHLORIDE 5 MG: 5 TABLET ORAL at 02:11

## 2020-11-18 RX ADMIN — HYDROMORPHONE HYDROCHLORIDE 0.5 MG: 1 INJECTION, SOLUTION INTRAMUSCULAR; INTRAVENOUS; SUBCUTANEOUS at 09:11

## 2020-11-18 RX ADMIN — CARVEDILOL 12.5 MG: 12.5 TABLET, FILM COATED ORAL at 08:11

## 2020-11-18 RX ADMIN — OXYCODONE HYDROCHLORIDE 5 MG: 5 TABLET ORAL at 06:11

## 2020-11-18 RX ADMIN — GABAPENTIN 600 MG: 300 CAPSULE ORAL at 08:11

## 2020-11-18 RX ADMIN — METHOCARBAMOL 500 MG: 500 TABLET ORAL at 06:11

## 2020-11-18 RX ADMIN — SIMETHICONE CHEW TAB 80 MG 80 MG: 80 TABLET ORAL at 09:11

## 2020-11-18 RX ADMIN — IOHEXOL 200 ML: 350 INJECTION, SOLUTION INTRAVENOUS at 09:11

## 2020-11-18 RX ADMIN — MELATONIN TAB 3 MG 6 MG: 3 TAB at 11:11

## 2020-11-18 RX ADMIN — SIMETHICONE CHEW TAB 80 MG 80 MG: 80 TABLET ORAL at 02:11

## 2020-11-18 RX ADMIN — SIMETHICONE CHEW TAB 80 MG 80 MG: 80 TABLET ORAL at 08:11

## 2020-11-18 RX ADMIN — METHOCARBAMOL 500 MG: 500 TABLET ORAL at 08:11

## 2020-11-18 RX ADMIN — TAMSULOSIN HYDROCHLORIDE 0.4 MG: 0.4 CAPSULE ORAL at 09:11

## 2020-11-18 RX ADMIN — METHOCARBAMOL 500 MG: 500 TABLET ORAL at 02:11

## 2020-11-18 RX ADMIN — OXYCODONE HYDROCHLORIDE 5 MG: 5 TABLET ORAL at 08:11

## 2020-11-18 RX ADMIN — ONDANSETRON 8 MG: 8 TABLET, ORALLY DISINTEGRATING ORAL at 02:11

## 2020-11-18 RX ADMIN — HYDROMORPHONE HYDROCHLORIDE 0.5 MG: 1 INJECTION, SOLUTION INTRAMUSCULAR; INTRAVENOUS; SUBCUTANEOUS at 03:11

## 2020-11-18 RX ADMIN — ACETAMINOPHEN 650 MG: 325 TABLET ORAL at 09:11

## 2020-11-18 RX ADMIN — ENOXAPARIN SODIUM 40 MG: 40 INJECTION SUBCUTANEOUS at 04:11

## 2020-11-18 RX ADMIN — SODIUM CHLORIDE, SODIUM LACTATE, POTASSIUM CHLORIDE, AND CALCIUM CHLORIDE: .6; .31; .03; .02 INJECTION, SOLUTION INTRAVENOUS at 11:11

## 2020-11-18 RX ADMIN — GABAPENTIN 600 MG: 300 CAPSULE ORAL at 09:11

## 2020-11-18 NOTE — NURSING
Called to bedside by PT who is reporting pain unrelieved by simethicone, oxy, and robaxin. PT insisting something else be done because he can't take the pain. On call MD notified. Orders received for stat Xray of abd and MD coming to assess PT at bedside.

## 2020-11-18 NOTE — NURSING
PT complaining of back pain and requesting a muscle relaxer. PT reports being prescribed a muscle relaxer before by his primary doctor that he takes for his back pain as needed when it flares up. On call MD notified. Orders received for robaxin. Will continue to manage POC.

## 2020-11-18 NOTE — PROGRESS NOTES
[facility]  General Surgery  Progress Note    Subjective:     History of Present Illness:  Jarrett Charlton Jr. is a 70 y.o. M with hx of UC s/p total proctocolectomy and now 6 days post op from ex lap, lysis of adhesions, parastomal hernia repair with mesh (sugarbaker) who presents to the ED with bloating and decreased ileostomy output.  He had presented 2 nights ago for the same.  He states that he is afraid to eat.  Having pain around his ileostomy site.  In the ED, labs grossly unremarkable.  CT scan shows no signs of obstruction, fluid collection around mesh unchanged from previous scan.  CRS was consulted for evaluation     Post-Op Info:  * No surgery found *         Interval History: Evaluated for abdominal bloating and chest pain overnight. No acute findings.  Ultimately treated with hydroxyzine, melatonin, pain control with improved comfort. Complains this morning of feeling bloated and believing he is stopped up.      Medications:  Continuous Infusions:  Scheduled Meds:   amLODIPine  5 mg Oral Daily    carvediloL  12.5 mg Oral BID    enoxaparin  40 mg Subcutaneous Q24H    gabapentin  600 mg Oral BID    simethicone  1 tablet Oral TID PC    tamsulosin  0.4 mg Oral Daily     PRN Meds:acetaminophen, melatonin, methocarbamoL, ondansetron, oxyCODONE, sodium chloride 0.9%     Review of patient's allergies indicates:  No Known Allergies  Objective:     Vital Signs (Most Recent):  Temp: 96.2 °F (35.7 °C) (11/17/20 2228)  Pulse: 106 (11/17/20 2228)  Resp: 15 (11/17/20 2228)  BP: 100/65 (11/17/20 2228)  SpO2: 96 % (11/17/20 2228) Vital Signs (24h Range):  Temp:  [96.2 °F (35.7 °C)-98.4 °F (36.9 °C)] 96.2 °F (35.7 °C)  Pulse:  [] 106  Resp:  [15-19] 15  SpO2:  [80 %-97 %] 96 %  BP: (100-145)/(65-90) 100/65     Weight: 95.3 kg (210 lb 1.6 oz)  Body mass index is 26.26 kg/m².    Intake/Output - Last 3 Shifts       11/16 0700 - 11/17 0659 11/17 0700 - 11/18 0659    P.O. 480 50    I.V.  (mL/kg) 605     Total Intake(mL/kg) 1085 50 (0.5)    Urine (mL/kg/hr) 0     Stool 50     Total Output 50     Net +1035 +50          Urine Occurrence 1 x           Physical Exam  Vitals signs and nursing note reviewed.   Constitutional:       Appearance: Normal appearance.   HENT:      Head: Normocephalic and atraumatic.   Cardiovascular:      Rate and Rhythm: Normal rate and regular rhythm.   Pulmonary:      Effort: Pulmonary effort is normal. No respiratory distress.   Abdominal:      Palpations: Abdomen is soft. There is no mass.      Comments: Mild distention, soft, appropriately tender to palpation  RLQ ostomy with gas and liquid stool in appliance     Musculoskeletal:      Right lower leg: No edema.      Left lower leg: No edema.   Skin:     General: Skin is warm and dry.   Neurological:      General: No focal deficit present.      Mental Status: He is alert and oriented to person, place, and time.   Psychiatric:         Mood and Affect: Mood normal.         Behavior: Behavior normal.         Significant Labs:  None    Significant Diagnostics:  I have reviewed all pertinent imaging results/findings within the past 24 hours.    Assessment/Plan:     Status post repair of ventral hernia  70 y.o. M with hx of UC s/p total proctocolectomy and now s/p ex lap, lysis of adhesions, parastomal hernia repair with mesh (11/09) who presented to ED 11/13 with abdominal pain and decreased ostomy output. Admitted for observation and evaluation.    - Small bowel follow through study ordered 11/17, awaiting results   - Low residue diet  - Continue simethicone   - Multimodal pain control  - Continue positive reinforcement, hydroxyzine for nightly anxiety    - Ostomy with some function, greatly improved   - No role for drainage of post-operative seroma seen on CT  - Continue home meds   - DVT: KAREN hose, lovenox        Dispo: continue floor care        Fatuma Zapien MD  General Surgery  [facility]

## 2020-11-18 NOTE — NURSING
"Pt called me to room c/o increased bloating and abdominal pain. When entering room pt sitting in chair wrenched over stating, "my belly feels bigger and tight like I need to belch really loud or throw up or the tube needs to go in to get this out." Zofran 8mg SL given and on call colo/rectal paged x2. Dr. Hutchins called back, situation explained. Verbalized no additional measure taken as of now until Flow exam of small bowel in the morning. Pt has had minimal output of ileostomy since this am,  Also aware of this. Bowel sounds are present in all quandrants, slightly slower than this AM assessment. Made patient aware of the  Orders but to call me again if pain worsens. Pt verbalized understanding. Left room with door open to sanchez, call light in reach, pt sitting in chair next to bed with feet elevated, instructed on relaxation techniques to decrease anxiety. VS stable. See flowsheet. Will continue to closely monitor.   "

## 2020-11-18 NOTE — ASSESSMENT & PLAN NOTE
70 y.o. M with hx of UC s/p total proctocolectomy and now s/p ex lap, lysis of adhesions, parastomal hernia repair with mesh (11/09) who presented to ED 11/13 with abdominal pain and decreased ostomy output. Admitted for observation and evaluation.    - Small bowel follow through study ordered 11/17, awaiting results   - Low residue diet  - Continue simethicone   - Multimodal pain control  - Continue positive reinforcement, hydroxyzine for nightly anxiety    - Ostomy with some function, greatly improved   - No role for drainage of post-operative seroma seen on CT  - Continue home meds   - DVT: KAREN hose, lovenox        Dispo: continue floor care

## 2020-11-18 NOTE — NURSING
"Pt called me to room again  C/o "pain that wont let up, its super deep pain in back and belly." Call to page dr. Zapien at [phone]. Waiting call back. Zofran 8mg SL given and pillows placed behind pt back in attempts for relief. Call light in reach, side rails x2, door open to hallway to continue to monitor while waiting for call back from doctor.   "

## 2020-11-18 NOTE — NURSING
Called to bedside by PT reporting dizziness, chest pain, ringing in his ears, shortness of breath, and back pain. Assessed PT, got a set of vitals and called MD on call for SX. MD on call made aware of all of the above. Received orders for STAT EKG and Troponin. Will continue to manage POC.

## 2020-11-18 NOTE — PROGRESS NOTES
Paged by RN for patient complaint of excruciating pain, feeling plugged up.     Upon reaching bedside, patient sitting up in chair wincing. He complains of intense upper abdominal pain, feeling stopped up which has worsened throughout the day.     Vital signs within normal limits. Ileostomy with liquid stool in appliance, actively putting out small amounts of stool consistently throughout interaction. On abdominal exam, patient guarded and winced in all quadrants. When coaxed to relax, abdomen was soft.     When attention was drawn to consistent ostomy output, patient reported that he had had better output with ostomies in the past which filled up his bag. He reported having NG tube placement in the past which helped alleviate his discomfort, and requested placement of one now. I counseled him that we would first obtain an xray and evaluate for distention before placing an NG.    KUB obtained and did not demonstrate significant distention of stomach or small bowel. Stomach was without excessive fluid.     Paged by RN again for complaints of chest and back pain. He characterized the pain as stabbing and in the center of his chest. He reported being short of breath as well.    Vitals remained stable. EKG obtained - no evidence of MI. Troponin WNL.      Assessment:  71 yo male with good, improving ostomy output, no signs of obstruction or cardiac complication. I believe Mr. Charlton is having a high amount of anxiety due to the slow ostomy output, abdominal discomfort, and pre-existing back pain. I believe this anxiety exacerbates his pain and discomfort.       Melatonin, hydroxyzine added to provide sleep aid and assistance with anxiety.  RN encouraged to give patient's PRN tylenol, which had not yet been given, to alleviate back pain.     Fatuma Zapien MD  PGY-1, General Surgery  [facility]

## 2020-11-18 NOTE — NURSING
"Pt arrived from scan via transport stretcher in "excruciating" lower back pain. Robaxin, tylenol and oxy ir 5mg given. Will reassess pain in 45 minutes. Pt lying semi morrell with side rails up x2, call light in reach, door open to nurse station. Will continue to monitor.  "

## 2020-11-18 NOTE — PLAN OF CARE
POC reviewed w PT, verbalized understanding. AAOX4. VSS. On RA. ML abd IX CRISTI w DB CDI and unchanged this shift. Ileostomy with small thin dark brown output, attempted to empty and measure this morning but PT refused and insisted he empty it, but not until later. Will pass on to day shift RN to record amount. PT up to chair and bathroom multiple times throughout the shift. Adequate UO.     Pain not managed with PRN meds per MAR. Robaxin, melatonin, and vistaril all added to MAR this shift. PT reports feeling no relief from oxy or robaxin. PT ended up falling asleep after melatonin and vistaril were administered... see multiple notes made throughout the shift for further explanation.     PT seems to be doing better this morning. Bed low. Call light within reach. Will continue to manage POC.

## 2020-11-18 NOTE — NURSING
On call MD currently on the floor. MD has seen EKG results and been made aware of PTs current condition. Received orders adding vistaril and melatonin to MAR. MD also advised me to give PT a dose of PRN tylenol. Will administer medications and continue to manage POC.

## 2020-11-18 NOTE — NURSING
Pt vomited green liquid bile x2. Orders obtained from dr. pimentel for iv pain medication. Dilaudid 0.5mg iv given while pt sitting on side the bed. Assisted pt back to bed sitting semi morrell. Pt stated pain started to ease up. Instructed not to get out of bed without calling for assistance. Call light in reach, side rails up x2, bed alarm on. Radiology in route to do last set of scans to complet sb ft study. Will continue to closely monitor.

## 2020-11-18 NOTE — SUBJECTIVE & OBJECTIVE
Interval History/Significant Events: Difficult to control pain overnight. KUB and EKG performed, no acute processes demonstrated. Tx for insomnia and anxiety with good response.         Objective:     Vital Signs (Most Recent):  Temp: 96.2 °F (35.7 °C) (11/17/20 2228)  Pulse: 106 (11/17/20 2228)  Resp: 15 (11/17/20 2228)  BP: 100/65 (11/17/20 2228)  SpO2: 96 % (11/17/20 2228) Vital Signs (24h Range):  Temp:  [96.2 °F (35.7 °C)-98.4 °F (36.9 °C)] 96.2 °F (35.7 °C)  Pulse:  [] 106  Resp:  [15-19] 15  SpO2:  [80 %-97 %] 96 %  BP: (100-145)/(65-90) 100/65     Weight: 95.3 kg (210 lb 1.6 oz)  Body mass index is 26.26 kg/m².      Intake/Output Summary (Last 24 hours) at 11/18/2020 0016  Last data filed at 11/17/2020 1300  Gross per 24 hour   Intake 895 ml   Output 50 ml   Net 845 ml       Physical Exam  Vitals signs and nursing note reviewed.   Constitutional:       Appearance: Normal appearance.   HENT:      Head: Normocephalic and atraumatic.   Cardiovascular:      Rate and Rhythm: Normal rate and regular rhythm.   Pulmonary:      Effort: Pulmonary effort is normal. No respiratory distress.   Abdominal:      Palpations: Abdomen is soft.      Tenderness: There is abdominal tenderness.      Hernia: No hernia is present.      Comments: Mildly distended, appropriately tender to palpation   RLQ ileostomy with pink mucosa, liquid stool in appliance, actively having output   Musculoskeletal:      Right lower leg: No edema.      Left lower leg: No edema.   Neurological:      General: No focal deficit present.      Mental Status: He is alert and oriented to person, place, and time.   Psychiatric:         Mood and Affect: Mood normal.         Behavior: Behavior normal.         Vents:       Lines/Drains/Airways     Drain                 Ileostomy 01/07/19 1101  days                Significant Labs:    CBC/Anemia Profile:  Recent Labs   Lab 11/16/20  0651   WBC 6.98   HGB 10.5*   HCT 33.9*      MCV 90   RDW  14.6*        Chemistries:  Recent Labs   Lab 11/16/20 0651      K 3.9      CO2 27   BUN 10   CREATININE 0.9   CALCIUM 8.3*       CMP:   Recent Labs   Lab 11/16/20 0651      K 3.9      CO2 27      BUN 10   CREATININE 0.9   CALCIUM 8.3*   ANIONGAP 10   EGFRNONAA >60.0       Significant Imaging:  I have reviewed all pertinent imaging results/findings within the past 24 hours.

## 2020-11-18 NOTE — SUBJECTIVE & OBJECTIVE
Interval History: Evaluated for abdominal bloating and chest pain overnight. No acute findings.  Ultimately treated with hydroxyzine, melatonin, pain control with improved comfort. Complains this morning of feeling bloated and believing he is stopped up.      Medications:  Continuous Infusions:  Scheduled Meds:   amLODIPine  5 mg Oral Daily    carvediloL  12.5 mg Oral BID    enoxaparin  40 mg Subcutaneous Q24H    gabapentin  600 mg Oral BID    simethicone  1 tablet Oral TID PC    tamsulosin  0.4 mg Oral Daily     PRN Meds:acetaminophen, melatonin, methocarbamoL, ondansetron, oxyCODONE, sodium chloride 0.9%     Review of patient's allergies indicates:  No Known Allergies  Objective:     Vital Signs (Most Recent):  Temp: 96.2 °F (35.7 °C) (11/17/20 2228)  Pulse: 106 (11/17/20 2228)  Resp: 15 (11/17/20 2228)  BP: 100/65 (11/17/20 2228)  SpO2: 96 % (11/17/20 2228) Vital Signs (24h Range):  Temp:  [96.2 °F (35.7 °C)-98.4 °F (36.9 °C)] 96.2 °F (35.7 °C)  Pulse:  [] 106  Resp:  [15-19] 15  SpO2:  [80 %-97 %] 96 %  BP: (100-145)/(65-90) 100/65     Weight: 95.3 kg (210 lb 1.6 oz)  Body mass index is 26.26 kg/m².    Intake/Output - Last 3 Shifts       11/16 0700 - 11/17 0659 11/17 0700 - 11/18 0659    P.O. 480 50    I.V. (mL/kg) 605     Total Intake(mL/kg) 1085 50 (0.5)    Urine (mL/kg/hr) 0     Stool 50     Total Output 50     Net +1035 +50          Urine Occurrence 1 x           Physical Exam  Vitals signs and nursing note reviewed.   Constitutional:       Appearance: Normal appearance.   HENT:      Head: Normocephalic and atraumatic.   Cardiovascular:      Rate and Rhythm: Normal rate and regular rhythm.   Pulmonary:      Effort: Pulmonary effort is normal. No respiratory distress.   Abdominal:      Palpations: Abdomen is soft. There is no mass.      Comments: Mild distention, soft, appropriately tender to palpation  RLQ ostomy with gas and liquid stool in appliance     Musculoskeletal:      Right lower leg: No  edema.      Left lower leg: No edema.   Skin:     General: Skin is warm and dry.   Neurological:      General: No focal deficit present.      Mental Status: He is alert and oriented to person, place, and time.   Psychiatric:         Mood and Affect: Mood normal.         Behavior: Behavior normal.         Significant Labs:  None    Significant Diagnostics:  I have reviewed all pertinent imaging results/findings within the past 24 hours.

## 2020-11-18 NOTE — PLAN OF CARE
Problem: Fall Injury Risk  Goal: Absence of Fall and Fall-Related Injury  Outcome: Ongoing, Progressing     Problem: Adult Inpatient Plan of Care  Goal: Plan of Care Review  Outcome: Ongoing, Progressing  Goal: Patient-Specific Goal (Individualization)  Outcome: Ongoing, Progressing  Goal: Absence of Hospital-Acquired Illness or Injury  Outcome: Ongoing, Progressing  Goal: Optimal Comfort and Wellbeing  Outcome: Ongoing, Progressing  Goal: Readiness for Transition of Care  Outcome: Ongoing, Progressing  Goal: Rounds/Family Conference  Outcome: Ongoing, Progressing     Problem: Diabetes Comorbidity  Goal: Blood Glucose Level Within Desired Range  Outcome: Ongoing, Progressing     Problem: Skin Injury Risk Increased  Goal: Skin Health and Integrity  Outcome: Ongoing, Progressing      "              After Visit Summary   4/26/2018    Vlad Burgess    MRN: [id number]           Patient Information     Date Of Birth          1976        Visit Information        Provider Department      4/26/2018 1:00 PM [facility]        Today's Diagnoses     Tightness in chest    -  1    Impacted cerumen of left ear        Daytime somnolence        Metabolic syndrome X           Follow-ups after your visit        Additional Services     SLEEP HOME STUDY REFERRAL                 Follow-up notes from your care team     Return if symptoms worsen or fail to improve.      Who to contact     If you have questions or need follow up information about today's clinic visit or your schedule please contact [facility] directly at [phone].  Normal or non-critical lab and imaging results will be communicated to you by MyChart, letter or phone within 4 business days after the clinic has received the results. If you do not hear from us within 7 days, please contact the clinic through MyChart or phone. If you have a critical or abnormal lab result, we will notify you by phone as soon as possible.  Submit refill requests through [employer] or call your pharmacy and they will forward the refill request to us. Please allow 3 business days for your refill to be completed.          Additional Information About Your Visit        MyChart Information     [employer] lets you send messages to your doctor, view your test results, renew your prescriptions, schedule appointments and more. To sign up, go to [email or web address] . Click on \"Log in\" on the left side of the screen, which will take you to the Welcome page. Then click on \"Sign up Now\" on the right side of the page.     You will be asked to enter the access code listed below, as well as some personal information. Please follow the directions to create your username and password.     Your access code is: [email or web address]  Expires: " "2018 12:48 PM     Your access code will  in 90 days. If you need help or a new code, please call your [facility] or [phone].        Care EveryWhere ID     This is your Care EveryWhere ID. This could be used by other organizations to access your [facility] medical records  [id number]        Your Vitals Were     Pulse Temperature Height Pulse Oximetry BMI (Body Mass Index)       93 98.7  F (37.1  C) (Oral) 5' 10\" (1.778 m) 96% 44.91 kg/m2        Blood Pressure from Last 3 Encounters:   18 (!) 138/92   18 130/86   17 118/78    Weight from Last 3 Encounters:   18 313 lb (142 kg)   18 (!) 311 lb (141.1 kg)   17 270 lb (122.5 kg)              We Performed the Following     EKG 12-lead complete w/read - Clinics     SLEEP HOME STUDY REFERRAL        Primary Care Provider Office Phone # Fax #    Rex Yee [phone] [address] [facility] [address]        Equal Access to Services     [facility] : Hadii aad ku hadasho Soomaali, waaxda luqadaha, qaybta kaalmada adeegyada, lydia palacio. So [facility] [phone].    ATENCIÓN: Si habla español, tiene a gonzalez disposición servicios gratuitos de asistencia lingüística. Jose Antonioame al [phone].    We comply with applicable federal civil rights laws and [place] laws. We do not discriminate on the basis of race, color, national origin, age, disability, sex, sexual orientation, or gender identity.            Thank you!     Thank you for choosing [facility]  for your care. Our goal is always to provide you with excellent care. Hearing back from our patients is one way we can continue to improve our services. Please take a few minutes to complete the written survey that you may receive in the mail after your visit with us. Thank you!             Your Updated Medication List - Protect others around you: Learn how to safely use, store and throw away your medicines " at www.disposemymeds.org.      Notice  As of 4/26/2018 11:59 PM    You have not been prescribed any medications.

## 2020-11-18 NOTE — NURSING
Nurse tech notified pt pressure 95/69 while lying in bed. LR started back at 100cc/hr per dr. Hutchins. Pt currently lying supine stated pain has decreased to a 3. Call light in reach, brakes locked, bed alarm on, door open to hallway. Will continue to monitor.

## 2020-11-19 LAB
ALBUMIN SERPL BCP-MCNC: 2.6 G/DL (ref 3.5–5.2)
ALP SERPL-CCNC: 104 U/L (ref 55–135)
ALT SERPL W/O P-5'-P-CCNC: 13 U/L (ref 10–44)
ANION GAP SERPL CALC-SCNC: 8 MMOL/L (ref 8–16)
AST SERPL-CCNC: 15 U/L (ref 10–40)
BASOPHILS # BLD AUTO: 0.02 K/UL (ref 0–0.2)
BASOPHILS NFR BLD: 0.2 % (ref 0–1.9)
BILIRUB SERPL-MCNC: 0.7 MG/DL (ref 0.1–1)
BUN SERPL-MCNC: 20 MG/DL (ref 8–23)
CALCIUM SERPL-MCNC: 8.3 MG/DL (ref 8.7–10.5)
CHLORIDE SERPL-SCNC: 99 MMOL/L (ref 95–110)
CO2 SERPL-SCNC: 35 MMOL/L (ref 23–29)
COMPLEXED PSA SERPL-MCNC: 0.18 NG/ML (ref 0–4)
CREAT SERPL-MCNC: 1.3 MG/DL (ref 0.5–1.4)
CRP SERPL-MCNC: 68.4 MG/L (ref 0–8.2)
DIFFERENTIAL METHOD: ABNORMAL
EOSINOPHIL # BLD AUTO: 0.1 K/UL (ref 0–0.5)
EOSINOPHIL NFR BLD: 0.8 % (ref 0–8)
ERYTHROCYTE [DISTWIDTH] IN BLOOD BY AUTOMATED COUNT: 14.8 % (ref 11.5–14.5)
EST. GFR  (AFRICAN AMERICAN): >60 ML/MIN/1.73 M^2
EST. GFR  (NON AFRICAN AMERICAN): 54.9 ML/MIN/1.73 M^2
GLUCOSE SERPL-MCNC: 137 MG/DL (ref 70–110)
HCT VFR BLD AUTO: 34.7 % (ref 40–54)
HGB BLD-MCNC: 10.7 G/DL (ref 14–18)
IMM GRANULOCYTES # BLD AUTO: 0.02 K/UL (ref 0–0.04)
IMM GRANULOCYTES NFR BLD AUTO: 0.2 % (ref 0–0.5)
LYMPHOCYTES # BLD AUTO: 1 K/UL (ref 1–4.8)
LYMPHOCYTES NFR BLD: 11 % (ref 18–48)
MCH RBC QN AUTO: 27.5 PG (ref 27–31)
MCHC RBC AUTO-ENTMCNC: 30.8 G/DL (ref 32–36)
MCV RBC AUTO: 89 FL (ref 82–98)
MONOCYTES # BLD AUTO: 0.5 K/UL (ref 0.3–1)
MONOCYTES NFR BLD: 5.4 % (ref 4–15)
NEUTROPHILS # BLD AUTO: 7.1 K/UL (ref 1.8–7.7)
NEUTROPHILS NFR BLD: 82.4 % (ref 38–73)
NRBC BLD-RTO: 0 /100 WBC
PLATELET # BLD AUTO: 227 K/UL (ref 150–350)
PMV BLD AUTO: 10.8 FL (ref 9.2–12.9)
POTASSIUM SERPL-SCNC: 3.3 MMOL/L (ref 3.5–5.1)
PROT SERPL-MCNC: 6 G/DL (ref 6–8.4)
RBC # BLD AUTO: 3.89 M/UL (ref 4.6–6.2)
SODIUM SERPL-SCNC: 142 MMOL/L (ref 136–145)
WBC # BLD AUTO: 8.65 K/UL (ref 3.9–12.7)

## 2020-11-19 PROCEDURE — 85025 COMPLETE CBC W/AUTO DIFF WBC: CPT

## 2020-11-19 PROCEDURE — 63600175 PHARM REV CODE 636 W HCPCS: Performed by: SURGERY

## 2020-11-19 PROCEDURE — 20600001 HC STEP DOWN PRIVATE ROOM

## 2020-11-19 PROCEDURE — 86140 C-REACTIVE PROTEIN: CPT

## 2020-11-19 PROCEDURE — 43752 NASAL/OROGASTRIC W/TUBE PLMT: CPT

## 2020-11-19 PROCEDURE — 80053 COMPREHEN METABOLIC PANEL: CPT

## 2020-11-19 PROCEDURE — 63600175 PHARM REV CODE 636 W HCPCS: Performed by: STUDENT IN AN ORGANIZED HEALTH CARE EDUCATION/TRAINING PROGRAM

## 2020-11-19 PROCEDURE — 25000003 PHARM REV CODE 250: Performed by: STUDENT IN AN ORGANIZED HEALTH CARE EDUCATION/TRAINING PROGRAM

## 2020-11-19 PROCEDURE — 36415 COLL VENOUS BLD VENIPUNCTURE: CPT

## 2020-11-19 PROCEDURE — 84153 ASSAY OF PSA TOTAL: CPT

## 2020-11-19 PROCEDURE — 25000003 PHARM REV CODE 250: Performed by: SURGERY

## 2020-11-19 RX ORDER — HYDROMORPHONE HYDROCHLORIDE 1 MG/ML
0.5 INJECTION, SOLUTION INTRAMUSCULAR; INTRAVENOUS; SUBCUTANEOUS EVERY 4 HOURS PRN
Status: DISCONTINUED | OUTPATIENT
Start: 2020-11-19 | End: 2020-11-30 | Stop reason: HOSPADM

## 2020-11-19 RX ORDER — FAMOTIDINE 10 MG/ML
20 INJECTION INTRAVENOUS DAILY
Status: DISCONTINUED | OUTPATIENT
Start: 2020-11-19 | End: 2020-11-23

## 2020-11-19 RX ADMIN — HYDROMORPHONE HYDROCHLORIDE 0.5 MG: 1 INJECTION, SOLUTION INTRAMUSCULAR; INTRAVENOUS; SUBCUTANEOUS at 10:11

## 2020-11-19 RX ADMIN — SODIUM CHLORIDE, SODIUM LACTATE, POTASSIUM CHLORIDE, AND CALCIUM CHLORIDE: .6; .31; .03; .02 INJECTION, SOLUTION INTRAVENOUS at 10:11

## 2020-11-19 RX ADMIN — FAMOTIDINE 20 MG: 10 INJECTION INTRAVENOUS at 08:11

## 2020-11-19 RX ADMIN — ENOXAPARIN SODIUM 40 MG: 40 INJECTION SUBCUTANEOUS at 04:11

## 2020-11-19 RX ADMIN — TAMSULOSIN HYDROCHLORIDE 0.4 MG: 0.4 CAPSULE ORAL at 08:11

## 2020-11-19 RX ADMIN — CARVEDILOL 12.5 MG: 12.5 TABLET, FILM COATED ORAL at 08:11

## 2020-11-19 RX ADMIN — SIMETHICONE CHEW TAB 80 MG 80 MG: 80 TABLET ORAL at 08:11

## 2020-11-19 RX ADMIN — SODIUM CHLORIDE, SODIUM LACTATE, POTASSIUM CHLORIDE, AND CALCIUM CHLORIDE 1000 ML: .6; .31; .03; .02 INJECTION, SOLUTION INTRAVENOUS at 08:11

## 2020-11-19 RX ADMIN — HYDROMORPHONE HYDROCHLORIDE 0.5 MG: 1 INJECTION, SOLUTION INTRAMUSCULAR; INTRAVENOUS; SUBCUTANEOUS at 08:11

## 2020-11-19 RX ADMIN — GABAPENTIN 600 MG: 300 CAPSULE ORAL at 08:11

## 2020-11-19 RX ADMIN — SIMETHICONE CHEW TAB 80 MG 80 MG: 80 TABLET ORAL at 02:11

## 2020-11-19 RX ADMIN — SIMETHICONE CHEW TAB 80 MG 80 MG: 80 TABLET ORAL at 06:11

## 2020-11-19 RX ADMIN — HYDROMORPHONE HYDROCHLORIDE 0.5 MG: 1 INJECTION, SOLUTION INTRAMUSCULAR; INTRAVENOUS; SUBCUTANEOUS at 03:11

## 2020-11-19 RX ADMIN — SODIUM CHLORIDE, SODIUM LACTATE, POTASSIUM CHLORIDE, AND CALCIUM CHLORIDE: .6; .31; .03; .02 INJECTION, SOLUTION INTRAVENOUS at 06:11

## 2020-11-19 RX ADMIN — AMLODIPINE BESYLATE 5 MG: 5 TABLET ORAL at 08:11

## 2020-11-19 NOTE — PROGRESS NOTES
[facility]  Colorectal Surgery  Progress Note    Patient Name: Jarrett Charlton Jr.  MRN: [id number]  Admission Date: 11/15/2020  Hospital Length of Stay: 2 days  Attending Physician: CED Haley MD  Interval History: NG placed overnight with approximately 1 L returned at time of placement, over 2 liters total.     Medications:  Continuous Infusions:   lactated ringers 125 mL/hr at 11/19/20 0628     Scheduled Meds:   amLODIPine  5 mg Oral Daily    carvediloL  12.5 mg Oral BID    enoxaparin  40 mg Subcutaneous Q24H    famotidine (PF)  20 mg Intravenous Daily    gabapentin  600 mg Oral BID    simethicone  1 tablet Oral TID PC    tamsulosin  0.4 mg Oral Daily     PRN Meds:acetaminophen, HYDROmorphone, melatonin, methocarbamoL, ondansetron, oxyCODONE, sodium chloride 0.9%     Review of patient's allergies indicates:  No Known Allergies  Objective:     Vital Signs (Most Recent):  Temp: 96.4 °F (35.8 °C) (11/19/20 0741)  Pulse: 90 (11/19/20 0741)  Resp: 16 (11/19/20 0741)  BP: 120/72 (11/19/20 0741)  SpO2: 96 % (11/19/20 0741) Vital Signs (24h Range):  Temp:  [96.2 °F (35.7 °C)-98 °F (36.7 °C)] 96.4 °F (35.8 °C)  Pulse:  [90-99] 90  Resp:  [15-20] 16  SpO2:  [93 %-99 %] 96 %  BP: ()/(64-84) 120/72     Weight: 95.3 kg (210 lb 1.6 oz)  Body mass index is 26.26 kg/m².    Intake/Output - Last 3 Shifts       11/17 0700 - 11/18 0659 11/18 0700 - 11/19 0659 11/19 0700 - 11/20 0659    P.O. 350 240     I.V. (mL/kg)  1200 (12.6)     Total Intake(mL/kg) 350 (3.7) 1440 (15.1)     Urine (mL/kg/hr) 250 (0.1) 200 (0.1)     Emesis/NG output 0 200     Drains  2900     Stool  140     Total Output 250 3440     Net +100 -2000            Urine Occurrence  1 x     Stool Occurrence  0 x           Physical Exam  Constitutional:       Appearance: Normal appearance.   HENT:      Head: Normocephalic and atraumatic.      Nose:      Comments: NG in place   Cardiovascular:      Rate and Rhythm: Normal rate and  regular rhythm.   Pulmonary:      Effort: Pulmonary effort is normal. No respiratory distress.   Abdominal:      General: Abdomen is flat. There is no distension.      Palpations: Abdomen is soft.      Comments: RLQ with ileostomy, healthy pink mucosa, liquid stool appliance   Musculoskeletal:      Right lower leg: No edema.      Left lower leg: No edema.   Skin:     General: Skin is warm and dry.   Neurological:      General: No focal deficit present.      Mental Status: He is alert and oriented to person, place, and time.   Psychiatric:         Mood and Affect: Mood normal.         Behavior: Behavior normal.           Assessment/Plan:     * Abdominal pain  Continue NG tube  NPO with ice chips  IVF  PRN pain meds  Will place on bowel rest today with NGT  Stoma output reassuring           Dieter Hutchins MD  Colorectal Surgery  [facility]

## 2020-11-19 NOTE — SUBJECTIVE & OBJECTIVE
Interval History: NG placed overnight with approximately 1 L returned at time of placement, over 2 liters total.     Medications:  Continuous Infusions:   lactated ringers 125 mL/hr at 11/19/20 0628     Scheduled Meds:   amLODIPine  5 mg Oral Daily    carvediloL  12.5 mg Oral BID    enoxaparin  40 mg Subcutaneous Q24H    famotidine (PF)  20 mg Intravenous Daily    gabapentin  600 mg Oral BID    simethicone  1 tablet Oral TID PC    tamsulosin  0.4 mg Oral Daily     PRN Meds:acetaminophen, HYDROmorphone, melatonin, methocarbamoL, ondansetron, oxyCODONE, sodium chloride 0.9%     Review of patient's allergies indicates:  No Known Allergies  Objective:     Vital Signs (Most Recent):  Temp: 96.4 °F (35.8 °C) (11/19/20 0741)  Pulse: 90 (11/19/20 0741)  Resp: 16 (11/19/20 0741)  BP: 120/72 (11/19/20 0741)  SpO2: 96 % (11/19/20 0741) Vital Signs (24h Range):  Temp:  [96.2 °F (35.7 °C)-98 °F (36.7 °C)] 96.4 °F (35.8 °C)  Pulse:  [90-99] 90  Resp:  [15-20] 16  SpO2:  [93 %-99 %] 96 %  BP: ()/(64-84) 120/72     Weight: 95.3 kg (210 lb 1.6 oz)  Body mass index is 26.26 kg/m².    Intake/Output - Last 3 Shifts       11/17 0700 - 11/18 0659 11/18 0700 - 11/19 0659 11/19 0700 - 11/20 0659    P.O. 350 240     I.V. (mL/kg)  1200 (12.6)     Total Intake(mL/kg) 350 (3.7) 1440 (15.1)     Urine (mL/kg/hr) 250 (0.1) 200 (0.1)     Emesis/NG output 0 200     Drains  2900     Stool  140     Total Output 250 3440     Net +100 -2000            Urine Occurrence  1 x     Stool Occurrence  0 x           Physical Exam  Constitutional:       Appearance: Normal appearance.   HENT:      Head: Normocephalic and atraumatic.      Nose:      Comments: NG in place   Cardiovascular:      Rate and Rhythm: Normal rate and regular rhythm.   Pulmonary:      Effort: Pulmonary effort is normal. No respiratory distress.   Abdominal:      General: Abdomen is flat. There is no distension.      Palpations: Abdomen is soft.      Comments: RLQ with  ileostomy, healthy pink mucosa, liquid stool appliance   Musculoskeletal:      Right lower leg: No edema.      Left lower leg: No edema.   Skin:     General: Skin is warm and dry.   Neurological:      General: No focal deficit present.      Mental Status: He is alert and oriented to person, place, and time.   Psychiatric:         Mood and Affect: Mood normal.         Behavior: Behavior normal.

## 2020-11-19 NOTE — ASSESSMENT & PLAN NOTE
70 y.o. M with hx of UC s/p total proctocolectomy and now s/p ex lap, lysis of adhesions, parastomal hernia repair with mesh (11/09) who presented to ED 11/13 with abdominal pain and decreased ostomy output. Admitted for observation and evaluation.    - Continue NG to low intermittent wall suction   - Ostomy output improving  - NPO with ice chips; IVF   - replacing NG output 1:1, maintaining maintenance fluid  - Continue simethicone   - Multimodal pain control  - Continue positive reinforcement, hydroxyzine for nightly anxiety    - No role for drainage of post-operative seroma seen on CT  - Continue home meds   - DVT: KAREN hose, lovenox        Dispo: continue floor care

## 2020-11-19 NOTE — ASSESSMENT & PLAN NOTE
Continue NG tube  NPO with ice chips  IVF  PRN pain meds  Will place on bowel rest today with NGT  Stoma output reassuring

## 2020-11-19 NOTE — SUBJECTIVE & OBJECTIVE
Interval History: NG with 1.2 L output over previous 24 hours. Ostomy output improving - solid stool within appliance. Complains of some throat soreness.     Medications:  Continuous Infusions:   lactated ringers 100 mL/hr at 11/18/20 2328     Scheduled Meds:   amLODIPine  5 mg Oral Daily    carvediloL  12.5 mg Oral BID    enoxaparin  40 mg Subcutaneous Q24H    gabapentin  600 mg Oral BID    simethicone  1 tablet Oral TID PC    tamsulosin  0.4 mg Oral Daily     PRN Meds:acetaminophen, HYDROmorphone, melatonin, methocarbamoL, ondansetron, oxyCODONE, sodium chloride 0.9%     Review of patient's allergies indicates:  No Known Allergies  Objective:     Vital Signs (Most Recent):  Temp: 98 °F (36.7 °C) (11/19/20 0355)  Pulse: 94 (11/19/20 0355)  Resp: 15 (11/19/20 0355)  BP: 125/74 (11/19/20 0355)  SpO2: (!) 93 % (11/19/20 0355) Vital Signs (24h Range):  Temp:  [96.2 °F (35.7 °C)-98 °F (36.7 °C)] 98 °F (36.7 °C)  Pulse:  [71-99] 94  Resp:  [15-20] 15  SpO2:  [93 %-99 %] 93 %  BP: ()/(64-84) 125/74     Weight: 95.3 kg (210 lb 1.6 oz)  Body mass index is 26.26 kg/m².    Intake/Output - Last 3 Shifts       11/17 0700 - 11/18 0659 11/18 0700 - 11/19 0659    P.O. 350 240    I.V. (mL/kg)  1200 (12.6)    Total Intake(mL/kg) 350 (3.7) 1440 (15.1)    Urine (mL/kg/hr) 250 (0.1) 200 (0.1)    Emesis/NG output 0 200    Drains  2900    Stool  140    Total Output 250 3440    Net +100 -2000          Urine Occurrence  1 x    Stool Occurrence  0 x          Physical Exam  Constitutional:       Appearance: Normal appearance.   HENT:      Head: Normocephalic and atraumatic.      Nose:      Comments: NG in place, thinning bilious output   Cardiovascular:      Rate and Rhythm: Normal rate and regular rhythm.   Pulmonary:      Effort: Pulmonary effort is normal. No respiratory distress.   Abdominal:      General: Abdomen is flat. There is no distension.      Palpations: Abdomen is soft.      Comments: RLQ with ileostomy, healthy  pink mucosa, soft stool within appliance   Musculoskeletal:      Right lower leg: No edema.      Left lower leg: No edema.   Skin:     General: Skin is warm and dry.   Neurological:      General: No focal deficit present.      Mental Status: He is alert and oriented to person, place, and time.   Psychiatric:         Mood and Affect: Mood normal.         Behavior: Behavior normal.         Significant Labs:  None    Significant Diagnostics:  None

## 2020-11-19 NOTE — PLAN OF CARE
"POC reviewed w PT, verbalized understanding. AAOX4. VSS. On RA.     ML abd IX CRISTI w DB healing WDL and unchanged this shift. Ileostomy to RUQ CDI, small amount of thin brown output.. see flowsheet for recorded outputs.     At top of shift PT began complaining of "unbearable pain in stomach." On assessment bowel sounds were active in all four quadrants and abd distention noted, but unchanged from the morning. PT was at side of bed panting, appearing to be in a lot of pain. When asked to describe his pain PT consistently stated "im bloated, put a tube down my nose" and never provided description of pain. PRN oxy and robaxin given which the PT threw up within 5-10 mins. MD on call for SX notified and orders to insert an NG tube were received.    NGT inserted at 2200, 1100mls out immediately. After placement was verified and NGT was connected to LIWS, another 900mls out within 15mins. Drainage has since started to let up. PT reports being much more comfortable since NG placement.     LR infusing at 100. Adequate UO this shift but urine is becoming noticeably more concentrated.     Pain not managed with 5mg oxy q 6. PT reports feeling relief from pain after receiving dilaudid, but it doesn't last long.     PT requesting PRN meds for acid reflux to be added to medication regimen. Notified on call MD of this. Not adding anything to MAR yet because NG output is too high to cut off suction intermittently to give meds. Primary team will reevaluate this AM.     PT resting currently. Will continue to manage POC.   "

## 2020-11-19 NOTE — PLAN OF CARE
POC reviewed with patient who verbalized understanding. VSS on RA. AAOX4. Remains free of falls and injury. Pt up to ambulate in hallways x 3.     - ML w/dermabond, CRISTI  - RUQ ileostomy  - NG R nare to LIWS  - m IVF    Denies nausea. Pain controlled with PRN medications per MAR. Educated on IS use. Patient denies chest pain & SOB. No acute events. No distress noted. Bed in lowest position, call light within reach, frequent rounds made for safety.     WCTM

## 2020-11-20 ENCOUNTER — TELEPHONE (OUTPATIENT)
Dept: SURGERY | Facility: CLINIC | Age: 70
End: 2020-11-20

## 2020-11-20 DIAGNOSIS — C61 PROSTATE CANCER: Primary | ICD-10-CM

## 2020-11-20 LAB
ANION GAP SERPL CALC-SCNC: 11 MMOL/L (ref 8–16)
BUN SERPL-MCNC: 19 MG/DL (ref 8–23)
CALCIUM SERPL-MCNC: 8.6 MG/DL (ref 8.7–10.5)
CHLORIDE SERPL-SCNC: 99 MMOL/L (ref 95–110)
CO2 SERPL-SCNC: 36 MMOL/L (ref 23–29)
CREAT SERPL-MCNC: 1.2 MG/DL (ref 0.5–1.4)
EST. GFR  (AFRICAN AMERICAN): >60 ML/MIN/1.73 M^2
EST. GFR  (NON AFRICAN AMERICAN): >60 ML/MIN/1.73 M^2
GLUCOSE SERPL-MCNC: 122 MG/DL (ref 70–110)
POTASSIUM SERPL-SCNC: 3.7 MMOL/L (ref 3.5–5.1)
SODIUM SERPL-SCNC: 146 MMOL/L (ref 136–145)

## 2020-11-20 PROCEDURE — 80048 BASIC METABOLIC PNL TOTAL CA: CPT

## 2020-11-20 PROCEDURE — 63600175 PHARM REV CODE 636 W HCPCS: Performed by: COLON & RECTAL SURGERY

## 2020-11-20 PROCEDURE — 63600175 PHARM REV CODE 636 W HCPCS: Performed by: SURGERY

## 2020-11-20 PROCEDURE — 20600001 HC STEP DOWN PRIVATE ROOM

## 2020-11-20 PROCEDURE — 25000003 PHARM REV CODE 250: Performed by: SURGERY

## 2020-11-20 PROCEDURE — 63600175 PHARM REV CODE 636 W HCPCS: Performed by: STUDENT IN AN ORGANIZED HEALTH CARE EDUCATION/TRAINING PROGRAM

## 2020-11-20 PROCEDURE — 25000003 PHARM REV CODE 250: Performed by: STUDENT IN AN ORGANIZED HEALTH CARE EDUCATION/TRAINING PROGRAM

## 2020-11-20 PROCEDURE — 36415 COLL VENOUS BLD VENIPUNCTURE: CPT

## 2020-11-20 RX ADMIN — GABAPENTIN 600 MG: 300 CAPSULE ORAL at 08:11

## 2020-11-20 RX ADMIN — SIMETHICONE CHEW TAB 80 MG 80 MG: 80 TABLET ORAL at 08:11

## 2020-11-20 RX ADMIN — CARVEDILOL 12.5 MG: 12.5 TABLET, FILM COATED ORAL at 08:11

## 2020-11-20 RX ADMIN — SODIUM CHLORIDE, SODIUM LACTATE, POTASSIUM CHLORIDE, AND CALCIUM CHLORIDE: .6; .31; .03; .02 INJECTION, SOLUTION INTRAVENOUS at 11:11

## 2020-11-20 RX ADMIN — SODIUM CHLORIDE, SODIUM LACTATE, POTASSIUM CHLORIDE, AND CALCIUM CHLORIDE: .6; .31; .03; .02 INJECTION, SOLUTION INTRAVENOUS at 04:11

## 2020-11-20 RX ADMIN — HYDROMORPHONE HYDROCHLORIDE 0.5 MG: 1 INJECTION, SOLUTION INTRAMUSCULAR; INTRAVENOUS; SUBCUTANEOUS at 02:11

## 2020-11-20 RX ADMIN — SIMETHICONE CHEW TAB 80 MG 80 MG: 80 TABLET ORAL at 06:11

## 2020-11-20 RX ADMIN — AMLODIPINE BESYLATE 5 MG: 5 TABLET ORAL at 08:11

## 2020-11-20 RX ADMIN — SODIUM CHLORIDE, SODIUM LACTATE, POTASSIUM CHLORIDE, AND CALCIUM CHLORIDE 500 ML: .6; .31; .03; .02 INJECTION, SOLUTION INTRAVENOUS at 01:11

## 2020-11-20 RX ADMIN — HYDROMORPHONE HYDROCHLORIDE 0.5 MG: 1 INJECTION, SOLUTION INTRAMUSCULAR; INTRAVENOUS; SUBCUTANEOUS at 11:11

## 2020-11-20 RX ADMIN — HYDROMORPHONE HYDROCHLORIDE 0.5 MG: 1 INJECTION, SOLUTION INTRAMUSCULAR; INTRAVENOUS; SUBCUTANEOUS at 04:11

## 2020-11-20 RX ADMIN — HYDROMORPHONE HYDROCHLORIDE 0.5 MG: 1 INJECTION, SOLUTION INTRAMUSCULAR; INTRAVENOUS; SUBCUTANEOUS at 08:11

## 2020-11-20 RX ADMIN — OXYCODONE HYDROCHLORIDE 5 MG: 5 TABLET ORAL at 06:11

## 2020-11-20 RX ADMIN — TAMSULOSIN HYDROCHLORIDE 0.4 MG: 0.4 CAPSULE ORAL at 08:11

## 2020-11-20 RX ADMIN — ENOXAPARIN SODIUM 40 MG: 40 INJECTION SUBCUTANEOUS at 04:11

## 2020-11-20 RX ADMIN — ONDANSETRON 8 MG: 8 TABLET, ORALLY DISINTEGRATING ORAL at 10:11

## 2020-11-20 RX ADMIN — SIMETHICONE CHEW TAB 80 MG 80 MG: 80 TABLET ORAL at 02:11

## 2020-11-20 RX ADMIN — FAMOTIDINE 20 MG: 10 INJECTION INTRAVENOUS at 08:11

## 2020-11-20 RX ADMIN — SODIUM CHLORIDE, SODIUM LACTATE, POTASSIUM CHLORIDE, AND CALCIUM CHLORIDE: .6; .31; .03; .02 INJECTION, SOLUTION INTRAVENOUS at 10:11

## 2020-11-20 RX ADMIN — SODIUM CHLORIDE, SODIUM LACTATE, POTASSIUM CHLORIDE, AND CALCIUM CHLORIDE 500 ML: .6; .31; .03; .02 INJECTION, SOLUTION INTRAVENOUS at 08:11

## 2020-11-20 RX ADMIN — SODIUM CHLORIDE, SODIUM LACTATE, POTASSIUM CHLORIDE, AND CALCIUM CHLORIDE: .6; .31; .03; .02 INJECTION, SOLUTION INTRAVENOUS at 02:11

## 2020-11-20 NOTE — PLAN OF CARE
POC reviewed with patient who verbalized understanding. VSS on RA. AAOX4. Remains free of falls and injury. Pt up to ambulate in hallways x 2.      - ML w/dermabond, CRISTI  - RUQ ileostomy  - NG R nare clamped, clear liquid diet started, tolerated well  - m IVF     Nausea controlled with PRN medications. Pain controlled with PRN medications per MAR. Educated on IS use. Patient denies chest pain & SOB. No acute events. No distress noted. Bed in lowest position, call light within reach, frequent rounds made for safety.      Night RN will resume care

## 2020-11-20 NOTE — PLAN OF CARE
POC reviewed with PT, verbalized understanding. AAOX4. VSS. On RA.     ML abd IX CRISTI w DB CDI and unchanged this shift. RUQ ileostomy in place, thin dark brown output.     LR infusing at 125. Adequate UO but urine is concentrated.     NGT in place to LIWS, see flowsheet for output.     PT denies nausea but frequently complains of indigestion. Pain managed effectively with PRN dilaudid.     No adverse events this shift. Bed low. Call light within reach. Will continue to manage POC.

## 2020-11-20 NOTE — PLAN OF CARE
"Patient is not medically ready for discharge.   Per progress note:    "- Continue NG to low intermittent wall suction   - Ostomy output improving - soft stool within appliance  - NPO with ice chips; IVF     - replacing NG output 1:1, maintaining maintenance fluid  - Continue simethicone   - Multimodal pain control  - Continue positive reinforcement, hydroxyzine for nightly anxiety    - No role for drainage of post-operative seroma seen on CT  - Continue home meds   - DVT: KAREN hose, lovenox"       11/20/20 1147   Discharge Reassessment   Assessment Type Discharge Planning Reassessment   Discharge Plan A Home   Discharge Plan B Home Health   DME Needed Upon Discharge  other (see comments)  (TBD)   Anticipated Discharge Disposition Home   Can the patient/caregiver answer the patient profile reliably? Yes, cognitively intact     Eva Bland RN, CM   Ext: [phone]    "

## 2020-11-20 NOTE — CONSULTS
Wound care consulted for ileosotmy  PMH:  DM 2 with chronic stage 3 kidney disease, HTN, chronic renal impairment, HLD, BPH, disc disease, GERD, ulcerative colitis, CKD, DDD, SBO, high output ileostomy, gout, PSA, BPH, bowel obstruction, parastomal hernia with obstruction and without gangrene, ileostomy- low output    Assessment:  The patient is sitting up in bed with HOB elevated 30 degrees, knee gatch up, NG to wall suction.   The patient is currently in no distress.  He has had the ileostomy since 1985.  The pouch has not been changed in about a week, remains intact. He is requesting assistance to change the pouch.  The ileostomy stoma is ~ 18mm, salma-stomal skin is intact/pink/dry. The stoma is red/moist/above skin level with ~ 125 ml of green fluid in pouch.    The patient describes taking ~ 23 minutes to change pouch, that he doesn't eat for 1 day prior to and 1 day after changing the pouch to make sure the seal is good. He cleans the skin with alcohol, no barrier film, paste and tape to the pouch after application to make sure it holds.   I reviewed the process for changing the pouch, sizing, and tugged on the pouch after application to demonstrate the holding strength to the skin. Patient requested samples of the pouch.     Recommendations:  Ostomy supplies at bedside. Samples will be ordered to his home  Nursing to continue care, ostomy care to follow-up  ARIELLE Perez RN, [facility]  [phone]

## 2020-11-20 NOTE — PROGRESS NOTES
[facility]  General Surgery  Progress Note    Subjective:     History of Present Illness:  Jarrett Charlton Jr. is a 70 y.o. M with hx of UC s/p total proctocolectomy and now 6 days post op from ex lap, lysis of adhesions, parastomal hernia repair with mesh (sugarbaker) who presents to the ED with bloating and decreased ileostomy output.  He had presented 2 nights ago for the same.  He states that he is afraid to eat.  Having pain around his ileostomy site.  In the ED, labs grossly unremarkable.  CT scan shows no signs of obstruction, fluid collection around mesh unchanged from previous scan.  CRS was consulted for evaluation     Post-Op Info:  * No surgery found *         Interval History: NG with 1.2 L output over previous 24 hours. Ostomy output improving - solid stool within appliance. Complains of some throat soreness.     Medications:  Continuous Infusions:   lactated ringers 100 mL/hr at 11/18/20 2328     Scheduled Meds:   amLODIPine  5 mg Oral Daily    carvediloL  12.5 mg Oral BID    enoxaparin  40 mg Subcutaneous Q24H    gabapentin  600 mg Oral BID    simethicone  1 tablet Oral TID PC    tamsulosin  0.4 mg Oral Daily     PRN Meds:acetaminophen, HYDROmorphone, melatonin, methocarbamoL, ondansetron, oxyCODONE, sodium chloride 0.9%     Review of patient's allergies indicates:  No Known Allergies  Objective:     Vital Signs (Most Recent):  Temp: 98 °F (36.7 °C) (11/19/20 0355)  Pulse: 94 (11/19/20 0355)  Resp: 15 (11/19/20 0355)  BP: 125/74 (11/19/20 0355)  SpO2: (!) 93 % (11/19/20 0355) Vital Signs (24h Range):  Temp:  [96.2 °F (35.7 °C)-98 °F (36.7 °C)] 98 °F (36.7 °C)  Pulse:  [71-99] 94  Resp:  [15-20] 15  SpO2:  [93 %-99 %] 93 %  BP: ()/(64-84) 125/74     Weight: 95.3 kg (210 lb 1.6 oz)  Body mass index is 26.26 kg/m².    Intake/Output - Last 3 Shifts       11/17 0700 - 11/18 0659 11/18 0700 - 11/19 0659    P.O. 350 240    I.V. (mL/kg)  1200 (12.6)    Total Intake(mL/kg)  350 (3.7) 1440 (15.1)    Urine (mL/kg/hr) 250 (0.1) 200 (0.1)    Emesis/NG output 0 200    Drains  2900    Stool  140    Total Output 250 3440    Net +100 -2000          Urine Occurrence  1 x    Stool Occurrence  0 x          Physical Exam  Constitutional:       Appearance: Normal appearance.   HENT:      Head: Normocephalic and atraumatic.      Nose:      Comments: NG in place, thinning bilious output   Cardiovascular:      Rate and Rhythm: Normal rate and regular rhythm.   Pulmonary:      Effort: Pulmonary effort is normal. No respiratory distress.   Abdominal:      General: Abdomen is flat. There is no distension.      Palpations: Abdomen is soft.      Comments: RLQ with ileostomy, healthy pink mucosa, soft stool within appliance   Musculoskeletal:      Right lower leg: No edema.      Left lower leg: No edema.   Skin:     General: Skin is warm and dry.   Neurological:      General: No focal deficit present.      Mental Status: He is alert and oriented to person, place, and time.   Psychiatric:         Mood and Affect: Mood normal.         Behavior: Behavior normal.         Significant Labs:  None    Significant Diagnostics:  None    Assessment/Plan:     Status post repair of ventral hernia  70 y.o. M with hx of UC s/p total proctocolectomy and now s/p ex lap, lysis of adhesions, parastomal hernia repair with mesh (11/09) who presented to ED 11/13 with abdominal pain and decreased ostomy output. Admitted for observation and evaluation.    - Continue NG to low intermittent wall suction   - Ostomy output improving - soft stool within appliance  - NPO with ice chips; IVF   - replacing NG output 1:1, maintaining maintenance fluid  - Continue simethicone   - Multimodal pain control  - Continue positive reinforcement, hydroxyzine for nightly anxiety    - No role for drainage of post-operative seroma seen on CT  - Continue home meds   - DVT: KAREN hose, lovenox        Dispo: continue floor care        Fatuma Zapien  MD  General Surgery  [facility]

## 2020-11-21 LAB
ANION GAP SERPL CALC-SCNC: 8 MMOL/L (ref 8–16)
BUN SERPL-MCNC: 15 MG/DL (ref 8–23)
CALCIUM SERPL-MCNC: 7.9 MG/DL (ref 8.7–10.5)
CHLORIDE SERPL-SCNC: 99 MMOL/L (ref 95–110)
CO2 SERPL-SCNC: 35 MMOL/L (ref 23–29)
CREAT SERPL-MCNC: 1.1 MG/DL (ref 0.5–1.4)
CRP SERPL-MCNC: 106.9 MG/L (ref 0–8.2)
EST. GFR  (AFRICAN AMERICAN): >60 ML/MIN/1.73 M^2
EST. GFR  (NON AFRICAN AMERICAN): >60 ML/MIN/1.73 M^2
GLUCOSE SERPL-MCNC: 134 MG/DL (ref 70–110)
POTASSIUM SERPL-SCNC: 3.1 MMOL/L (ref 3.5–5.1)
SODIUM SERPL-SCNC: 142 MMOL/L (ref 136–145)

## 2020-11-21 PROCEDURE — 63600175 PHARM REV CODE 636 W HCPCS: Performed by: SURGERY

## 2020-11-21 PROCEDURE — 25500020 PHARM REV CODE 255: Performed by: COLON & RECTAL SURGERY

## 2020-11-21 PROCEDURE — 63600175 PHARM REV CODE 636 W HCPCS: Performed by: STUDENT IN AN ORGANIZED HEALTH CARE EDUCATION/TRAINING PROGRAM

## 2020-11-21 PROCEDURE — 20600001 HC STEP DOWN PRIVATE ROOM

## 2020-11-21 PROCEDURE — 25000003 PHARM REV CODE 250: Performed by: STUDENT IN AN ORGANIZED HEALTH CARE EDUCATION/TRAINING PROGRAM

## 2020-11-21 PROCEDURE — 36415 COLL VENOUS BLD VENIPUNCTURE: CPT

## 2020-11-21 PROCEDURE — 80048 BASIC METABOLIC PNL TOTAL CA: CPT

## 2020-11-21 PROCEDURE — 86140 C-REACTIVE PROTEIN: CPT

## 2020-11-21 PROCEDURE — 25000003 PHARM REV CODE 250: Performed by: SURGERY

## 2020-11-21 PROCEDURE — 25500020 PHARM REV CODE 255: Performed by: STUDENT IN AN ORGANIZED HEALTH CARE EDUCATION/TRAINING PROGRAM

## 2020-11-21 PROCEDURE — S5010 5% DEXTROSE AND 0.45% SALINE: HCPCS | Performed by: STUDENT IN AN ORGANIZED HEALTH CARE EDUCATION/TRAINING PROGRAM

## 2020-11-21 RX ORDER — DEXTROSE, SODIUM CHLORIDE, SODIUM LACTATE, POTASSIUM CHLORIDE, AND CALCIUM CHLORIDE 5; .6; .31; .03; .02 G/100ML; G/100ML; G/100ML; G/100ML; G/100ML
INJECTION, SOLUTION INTRAVENOUS CONTINUOUS
Status: DISCONTINUED | OUTPATIENT
Start: 2020-11-21 | End: 2020-11-21

## 2020-11-21 RX ORDER — DEXTROSE, SODIUM CHLORIDE, SODIUM LACTATE, POTASSIUM CHLORIDE, AND CALCIUM CHLORIDE 5; .6; .31; .03; .02 G/100ML; G/100ML; G/100ML; G/100ML; G/100ML
INJECTION, SOLUTION INTRAVENOUS CONTINUOUS
Status: DISCONTINUED | OUTPATIENT
Start: 2020-11-21 | End: 2020-11-28

## 2020-11-21 RX ORDER — DEXTROSE MONOHYDRATE AND SODIUM CHLORIDE 5; .45 G/100ML; G/100ML
INJECTION, SOLUTION INTRAVENOUS CONTINUOUS
Status: DISCONTINUED | OUTPATIENT
Start: 2020-11-21 | End: 2020-11-21

## 2020-11-21 RX ORDER — ROSUVASTATIN CALCIUM 10 MG/1
10 TABLET, COATED ORAL NIGHTLY
Status: DISCONTINUED | OUTPATIENT
Start: 2020-11-21 | End: 2020-11-30 | Stop reason: HOSPADM

## 2020-11-21 RX ORDER — ASPIRIN 81 MG/1
81 TABLET ORAL DAILY
Status: DISCONTINUED | OUTPATIENT
Start: 2020-11-21 | End: 2020-11-30 | Stop reason: HOSPADM

## 2020-11-21 RX ORDER — AMITRIPTYLINE HYDROCHLORIDE 50 MG/1
50 TABLET, FILM COATED ORAL NIGHTLY
Status: DISCONTINUED | OUTPATIENT
Start: 2020-11-21 | End: 2020-11-30 | Stop reason: HOSPADM

## 2020-11-21 RX ORDER — POTASSIUM CHLORIDE 7.45 MG/ML
80 INJECTION INTRAVENOUS ONCE
Status: COMPLETED | OUTPATIENT
Start: 2020-11-21 | End: 2020-11-21

## 2020-11-21 RX ORDER — MAGNESIUM SULFATE HEPTAHYDRATE 40 MG/ML
2 INJECTION, SOLUTION INTRAVENOUS ONCE
Status: COMPLETED | OUTPATIENT
Start: 2020-11-21 | End: 2020-11-21

## 2020-11-21 RX ADMIN — GABAPENTIN 600 MG: 300 CAPSULE ORAL at 10:11

## 2020-11-21 RX ADMIN — MAGNESIUM SULFATE IN WATER 2 G: 40 INJECTION, SOLUTION INTRAVENOUS at 10:11

## 2020-11-21 RX ADMIN — DEXTROSE AND SODIUM CHLORIDE: 5; .45 INJECTION, SOLUTION INTRAVENOUS at 10:11

## 2020-11-21 RX ADMIN — SIMETHICONE CHEW TAB 80 MG 80 MG: 80 TABLET ORAL at 08:11

## 2020-11-21 RX ADMIN — TAMSULOSIN HYDROCHLORIDE 0.4 MG: 0.4 CAPSULE ORAL at 10:11

## 2020-11-21 RX ADMIN — ASPIRIN 81 MG: 81 TABLET, COATED ORAL at 10:11

## 2020-11-21 RX ADMIN — HYDROMORPHONE HYDROCHLORIDE 0.5 MG: 1 INJECTION, SOLUTION INTRAMUSCULAR; INTRAVENOUS; SUBCUTANEOUS at 08:11

## 2020-11-21 RX ADMIN — HYDROMORPHONE HYDROCHLORIDE 0.5 MG: 1 INJECTION, SOLUTION INTRAMUSCULAR; INTRAVENOUS; SUBCUTANEOUS at 02:11

## 2020-11-21 RX ADMIN — HYDROMORPHONE HYDROCHLORIDE 0.5 MG: 1 INJECTION, SOLUTION INTRAMUSCULAR; INTRAVENOUS; SUBCUTANEOUS at 05:11

## 2020-11-21 RX ADMIN — SODIUM CHLORIDE, SODIUM LACTATE, POTASSIUM CHLORIDE, CALCIUM CHLORIDE, AND DEXTROSE MONOHYDRATE: 600; 310; 30; 20; 5 INJECTION, SOLUTION INTRAVENOUS at 01:11

## 2020-11-21 RX ADMIN — SODIUM CHLORIDE, SODIUM LACTATE, POTASSIUM CHLORIDE, AND CALCIUM CHLORIDE: .6; .31; .03; .02 INJECTION, SOLUTION INTRAVENOUS at 05:11

## 2020-11-21 RX ADMIN — AMLODIPINE BESYLATE 5 MG: 5 TABLET ORAL at 10:11

## 2020-11-21 RX ADMIN — AMITRIPTYLINE HYDROCHLORIDE 50 MG: 50 TABLET, FILM COATED ORAL at 09:11

## 2020-11-21 RX ADMIN — HYDROMORPHONE HYDROCHLORIDE 0.5 MG: 1 INJECTION, SOLUTION INTRAMUSCULAR; INTRAVENOUS; SUBCUTANEOUS at 10:11

## 2020-11-21 RX ADMIN — FAMOTIDINE 20 MG: 10 INJECTION INTRAVENOUS at 10:11

## 2020-11-21 RX ADMIN — POTASSIUM CHLORIDE 80 MEQ: 7.46 INJECTION, SOLUTION INTRAVENOUS at 10:11

## 2020-11-21 RX ADMIN — ROSUVASTATIN CALCIUM 10 MG: 10 TABLET, FILM COATED ORAL at 09:11

## 2020-11-21 RX ADMIN — GABAPENTIN 600 MG: 300 CAPSULE ORAL at 09:11

## 2020-11-21 RX ADMIN — ENOXAPARIN SODIUM 40 MG: 40 INJECTION SUBCUTANEOUS at 04:11

## 2020-11-21 RX ADMIN — CARVEDILOL 12.5 MG: 12.5 TABLET, FILM COATED ORAL at 10:11

## 2020-11-21 RX ADMIN — IOHEXOL 100 ML: 350 INJECTION, SOLUTION INTRAVENOUS at 02:11

## 2020-11-21 RX ADMIN — CARVEDILOL 12.5 MG: 12.5 TABLET, FILM COATED ORAL at 09:11

## 2020-11-21 RX ADMIN — SIMETHICONE CHEW TAB 80 MG 80 MG: 80 TABLET ORAL at 10:11

## 2020-11-21 RX ADMIN — IOHEXOL 15 ML: 350 INJECTION, SOLUTION INTRAVENOUS at 10:11

## 2020-11-21 RX ADMIN — IOHEXOL 15 ML: 350 INJECTION, SOLUTION INTRAVENOUS at 11:11

## 2020-11-21 NOTE — SUBJECTIVE & OBJECTIVE
Interval History: Patient seen and examined this morning. Patient endorsing nausea throughout NG clamp trial. 1.3L out of NG tube. 375mL output from ostomy. CRP increasing. No new complaints at this time.    Medications:  Continuous Infusions:   dextrose 5% lactated ringers       Scheduled Meds:   amitriptyline  50 mg Oral QHS    amLODIPine  5 mg Oral Daily    aspirin  81 mg Oral Daily    carvediloL  12.5 mg Oral BID    enoxaparin  40 mg Subcutaneous Q24H    famotidine (PF)  20 mg Intravenous Daily    gabapentin  600 mg Oral BID    magnesium sulfate IVPB  2 g Intravenous Once    potassium chloride in water  80 mEq Intravenous Once    rosuvastatin  10 mg Oral QHS    simethicone  1 tablet Oral TID PC    tamsulosin  0.4 mg Oral Daily     PRN Meds:acetaminophen, HYDROmorphone, iohexol, melatonin, methocarbamoL, ondansetron, oxyCODONE, sodium chloride 0.9%     Review of patient's allergies indicates:  No Known Allergies  Objective:     Vital Signs (Most Recent):  Temp: 98.4 °F (36.9 °C) (11/21/20 0518)  Pulse: 92 (11/21/20 0518)  Resp: 20 (11/21/20 0518)  BP: 123/71 (11/21/20 0518)  SpO2: (!) 91 % (11/21/20 0629) Vital Signs (24h Range):  Temp:  [98.1 °F (36.7 °C)-99 °F (37.2 °C)] 98.4 °F (36.9 °C)  Pulse:  [87-97] 92  Resp:  [16-20] 20  SpO2:  [87 %-96 %] 91 %  BP: (123-138)/(71-77) 123/71     Weight: 95.3 kg (210 lb 1.6 oz)  Body mass index is 26.26 kg/m².    Intake/Output - Last 3 Shifts       11/19 0700 - 11/20 0659 11/20 0700 - 11/21 0659 11/21 0700 - 11/22 0659    P.O. 120 840     I.V. (mL/kg) 2097.9 (22) 2972.9 (31.2)     IV Piggyback  500     Total Intake(mL/kg) 2217.9 (23.3) 4312.9 (45.3)     Urine (mL/kg/hr) 700 (0.3) 800 (0.3)     Emesis/NG output 0 0     Drains 1250 1700     Stool 300 375     Total Output 2250 2875     Net -32.1 +1437.9            Urine Occurrence 1 x 0 x     Stool Occurrence 0 x 0 x     Emesis Occurrence  0 x           Physical Exam  Constitutional:       Appearance: Normal  appearance.   HENT:      Head: Normocephalic and atraumatic.      Nose:      Comments: NG in place; bilious output  Cardiovascular:      Rate and Rhythm: Normal rate and regular rhythm.   Pulmonary:      Effort: Pulmonary effort is normal. No respiratory distress.   Abdominal:      General: Abdomen is flat. There is no distension.      Palpations: Abdomen is soft.      Comments: RLQ with ileostomy, healthy pink mucosa, liquid stool appliance   Musculoskeletal:      Right lower leg: No edema.      Left lower leg: No edema.   Skin:     General: Skin is warm and dry.   Neurological:      General: No focal deficit present.      Mental Status: He is alert and oriented to person, place, and time.   Psychiatric:         Mood and Affect: Mood normal.         Behavior: Behavior normal.         Significant Labs:  CBC:   Recent Labs   Lab 11/19/20  1458   WBC 8.65   RBC 3.89*   HGB 10.7*   HCT 34.7*      MCV 89   MCH 27.5   MCHC 30.8*     CMP:   Recent Labs   Lab 11/19/20  1458  11/21/20  0548   *   < > 134*   CALCIUM 8.3*   < > 7.9*   ALBUMIN 2.6*  --   --    PROT 6.0  --   --       < > 142   K 3.3*   < > 3.1*   CO2 35*   < > 35*   CL 99   < > 99   BUN 20   < > 15   CREATININE 1.3   < > 1.1   ALKPHOS 104  --   --    ALT 13  --   --    AST 15  --   --    BILITOT 0.7  --   --     < > = values in this interval not displayed.       Significant Diagnostics:  I have reviewed all pertinent imaging results/findings within the past 24 hours.

## 2020-11-21 NOTE — ASSESSMENT & PLAN NOTE
70 y.o. M with hx of UC s/p total proctocolectomy and now s/p ex lap, lysis of adhesions, parastomal hernia repair with mesh (11/09) who presented to ED 11/13 with abdominal pain and decreased ostomy output. Admitted for observation and evaluation.    - failed NG tube clamp trial overnight  - continued nausea with sips  - Continue NG to low intermittent wall suction   - CRP continues to increase  - plan for CT A/P today  - Ostomy output improving  - NPO with sips   - replacing NG output 1:1   - mIVFs to D5 1/2NS  - Continue simethicone   - Multimodal pain control  - strict PO intake monitoring  - Continue positive reinforcement, hydroxyzine for nightly anxiety    - Continue home meds   - DVT: KAREN hose, lovenox        Dispo: continue floor care

## 2020-11-21 NOTE — PROGRESS NOTES
[facility]  General Surgery  Progress Note    Subjective:     History of Present Illness:  Jarrett Charlton Jr. is a 70 y.o. M with hx of UC s/p total proctocolectomy and now 6 days post op from ex lap, lysis of adhesions, parastomal hernia repair with mesh (sugarbaker) who presents to the ED with bloating and decreased ileostomy output.  He had presented 2 nights ago for the same.  He states that he is afraid to eat.  Having pain around his ileostomy site.  In the ED, labs grossly unremarkable.  CT scan shows no signs of obstruction, fluid collection around mesh unchanged from previous scan.  CRS was consulted for evaluation     Post-Op Info:  * No surgery found *         Interval History: Patient seen and examined this morning. Patient endorsing nausea throughout NG clamp trial. 1.3L out of NG tube. 375mL output from ostomy. CRP increasing. No new complaints at this time.    Medications:  Continuous Infusions:   dextrose 5% lactated ringers       Scheduled Meds:   amitriptyline  50 mg Oral QHS    amLODIPine  5 mg Oral Daily    aspirin  81 mg Oral Daily    carvediloL  12.5 mg Oral BID    enoxaparin  40 mg Subcutaneous Q24H    famotidine (PF)  20 mg Intravenous Daily    gabapentin  600 mg Oral BID    magnesium sulfate IVPB  2 g Intravenous Once    potassium chloride in water  80 mEq Intravenous Once    rosuvastatin  10 mg Oral QHS    simethicone  1 tablet Oral TID PC    tamsulosin  0.4 mg Oral Daily     PRN Meds:acetaminophen, HYDROmorphone, iohexol, melatonin, methocarbamoL, ondansetron, oxyCODONE, sodium chloride 0.9%     Review of patient's allergies indicates:  No Known Allergies  Objective:     Vital Signs (Most Recent):  Temp: 98.4 °F (36.9 °C) (11/21/20 0518)  Pulse: 92 (11/21/20 0518)  Resp: 20 (11/21/20 0518)  BP: 123/71 (11/21/20 0518)  SpO2: (!) 91 % (11/21/20 0629) Vital Signs (24h Range):  Temp:  [98.1 °F (36.7 °C)-99 °F (37.2 °C)] 98.4 °F (36.9 °C)  Pulse:  [87-97]  92  Resp:  [16-20] 20  SpO2:  [87 %-96 %] 91 %  BP: (123-138)/(71-77) 123/71     Weight: 95.3 kg (210 lb 1.6 oz)  Body mass index is 26.26 kg/m².    Intake/Output - Last 3 Shifts       11/19 0700 - 11/20 0659 11/20 0700 - 11/21 0659 11/21 0700 - 11/22 0659    P.O. 120 840     I.V. (mL/kg) 2097.9 (22) 2972.9 (31.2)     IV Piggyback  500     Total Intake(mL/kg) 2217.9 (23.3) 4312.9 (45.3)     Urine (mL/kg/hr) 700 (0.3) 800 (0.3)     Emesis/NG output 0 0     Drains 1250 1700     Stool 300 375     Total Output 2250 2875     Net -32.1 +1437.9            Urine Occurrence 1 x 0 x     Stool Occurrence 0 x 0 x     Emesis Occurrence  0 x           Physical Exam  Constitutional:       Appearance: Normal appearance.   HENT:      Head: Normocephalic and atraumatic.      Nose:      Comments: NG in place; bilious output  Cardiovascular:      Rate and Rhythm: Normal rate and regular rhythm.   Pulmonary:      Effort: Pulmonary effort is normal. No respiratory distress.   Abdominal:      General: Abdomen is flat. There is no distension.      Palpations: Abdomen is soft.      Comments: RLQ with ileostomy, healthy pink mucosa, liquid stool appliance   Musculoskeletal:      Right lower leg: No edema.      Left lower leg: No edema.   Skin:     General: Skin is warm and dry.   Neurological:      General: No focal deficit present.      Mental Status: He is alert and oriented to person, place, and time.   Psychiatric:         Mood and Affect: Mood normal.         Behavior: Behavior normal.         Significant Labs:  CBC:   Recent Labs   Lab 11/19/20  1458   WBC 8.65   RBC 3.89*   HGB 10.7*   HCT 34.7*      MCV 89   MCH 27.5   MCHC 30.8*     CMP:   Recent Labs   Lab 11/19/20  1458  11/21/20  0548   *   < > 134*   CALCIUM 8.3*   < > 7.9*   ALBUMIN 2.6*  --   --    PROT 6.0  --   --       < > 142   K 3.3*   < > 3.1*   CO2 35*   < > 35*   CL 99   < > 99   BUN 20   < > 15   CREATININE 1.3   < > 1.1   ALKPHOS 104  --   --     ALT 13  --   --    AST 15  --   --    BILITOT 0.7  --   --     < > = values in this interval not displayed.       Significant Diagnostics:  I have reviewed all pertinent imaging results/findings within the past 24 hours.    Assessment/Plan:     Status post repair of ventral hernia  70 y.o. M with hx of UC s/p total proctocolectomy and now s/p ex lap, lysis of adhesions, parastomal hernia repair with mesh (11/09) who presented to ED 11/13 with abdominal pain and decreased ostomy output. Admitted for observation and evaluation.    - failed NG tube clamp trial overnight  - continued nausea with sips  - Continue NG to low intermittent wall suction   - CRP continues to increase  - plan for CT A/P today  - Ostomy output improving  - NPO with sips   - replacing NG output 1:1   - mIVFs to D5 1/2NS  - Continue simethicone   - Multimodal pain control  - strict PO intake monitoring  - Continue positive reinforcement, hydroxyzine for nightly anxiety    - Continue home meds   - DVT: KAREN hose, lovenox        Dispo: continue floor care        Waldemar Nuñez MD  General Surgery  [facility]

## 2020-11-21 NOTE — NURSING
Called to bedside by PT claiming he was in pain, nauseous, and belching. On call MD notified. Connected PTs NGT back to LIWS at 2345 per MD. 450mLs out at time of reconnection. 0.5mg dilaudid given for pain. PT now resting quietly in bed. Will continue to manage POC.

## 2020-11-21 NOTE — PLAN OF CARE
POC reviewed with PT, verbalized understanding. AAOX4. VSS. On RA.      ML abd IX CRISTI w DB CDI and unchanged this shift. RUQ ileostomy in place, thin dark brown output, see flowsheet.      LR infusing at 125. Low, concentrated UO.      NGT in place to LIWS, see flowsheet for output. NGT clamped at start of shift, PT tolerated well until 2330 when he started to complain of nausea and reported belching. PT also was distended. PT was then reconnected to suction per MD on call for SX, see prior note.      Pain managed effectively with PRN dilaudid.      PT slept very well in between care. No adverse events this shift. Bed low. Call light within reach. Will continue to manage POC.

## 2020-11-22 LAB
ANION GAP SERPL CALC-SCNC: 9 MMOL/L (ref 8–16)
BASOPHILS # BLD AUTO: 0.02 K/UL (ref 0–0.2)
BASOPHILS NFR BLD: 0.3 % (ref 0–1.9)
BUN SERPL-MCNC: 14 MG/DL (ref 8–23)
CALCIUM SERPL-MCNC: 7.9 MG/DL (ref 8.7–10.5)
CHLORIDE SERPL-SCNC: 99 MMOL/L (ref 95–110)
CO2 SERPL-SCNC: 33 MMOL/L (ref 23–29)
CREAT SERPL-MCNC: 1 MG/DL (ref 0.5–1.4)
CRP SERPL-MCNC: 109.8 MG/L (ref 0–8.2)
DIFFERENTIAL METHOD: ABNORMAL
EOSINOPHIL # BLD AUTO: 0.1 K/UL (ref 0–0.5)
EOSINOPHIL NFR BLD: 2.3 % (ref 0–8)
ERYTHROCYTE [DISTWIDTH] IN BLOOD BY AUTOMATED COUNT: 14.6 % (ref 11.5–14.5)
EST. GFR  (AFRICAN AMERICAN): >60 ML/MIN/1.73 M^2
EST. GFR  (NON AFRICAN AMERICAN): >60 ML/MIN/1.73 M^2
GLUCOSE SERPL-MCNC: 137 MG/DL (ref 70–110)
HCT VFR BLD AUTO: 31.3 % (ref 40–54)
HGB BLD-MCNC: 9.4 G/DL (ref 14–18)
IMM GRANULOCYTES # BLD AUTO: 0.01 K/UL (ref 0–0.04)
IMM GRANULOCYTES NFR BLD AUTO: 0.2 % (ref 0–0.5)
LYMPHOCYTES # BLD AUTO: 0.8 K/UL (ref 1–4.8)
LYMPHOCYTES NFR BLD: 13.2 % (ref 18–48)
MAGNESIUM SERPL-MCNC: 2 MG/DL (ref 1.6–2.6)
MCH RBC QN AUTO: 27.6 PG (ref 27–31)
MCHC RBC AUTO-ENTMCNC: 30 G/DL (ref 32–36)
MCV RBC AUTO: 92 FL (ref 82–98)
MONOCYTES # BLD AUTO: 0.4 K/UL (ref 0.3–1)
MONOCYTES NFR BLD: 7 % (ref 4–15)
NEUTROPHILS # BLD AUTO: 4.6 K/UL (ref 1.8–7.7)
NEUTROPHILS NFR BLD: 77 % (ref 38–73)
NRBC BLD-RTO: 0 /100 WBC
PHOSPHATE SERPL-MCNC: 3.2 MG/DL (ref 2.7–4.5)
PLATELET # BLD AUTO: 213 K/UL (ref 150–350)
PMV BLD AUTO: 10.9 FL (ref 9.2–12.9)
POTASSIUM SERPL-SCNC: 3.6 MMOL/L (ref 3.5–5.1)
RBC # BLD AUTO: 3.41 M/UL (ref 4.6–6.2)
SARS-COV-2 RDRP RESP QL NAA+PROBE: NEGATIVE
SODIUM SERPL-SCNC: 141 MMOL/L (ref 136–145)
WBC # BLD AUTO: 6 K/UL (ref 3.9–12.7)

## 2020-11-22 PROCEDURE — 83735 ASSAY OF MAGNESIUM: CPT

## 2020-11-22 PROCEDURE — 63600175 PHARM REV CODE 636 W HCPCS: Performed by: STUDENT IN AN ORGANIZED HEALTH CARE EDUCATION/TRAINING PROGRAM

## 2020-11-22 PROCEDURE — A4217 STERILE WATER/SALINE, 500 ML: HCPCS | Performed by: STUDENT IN AN ORGANIZED HEALTH CARE EDUCATION/TRAINING PROGRAM

## 2020-11-22 PROCEDURE — 20600001 HC STEP DOWN PRIVATE ROOM

## 2020-11-22 PROCEDURE — 25000003 PHARM REV CODE 250: Performed by: STUDENT IN AN ORGANIZED HEALTH CARE EDUCATION/TRAINING PROGRAM

## 2020-11-22 PROCEDURE — 63600175 PHARM REV CODE 636 W HCPCS: Performed by: SURGERY

## 2020-11-22 PROCEDURE — B4185 PARENTERAL SOL 10 GM LIPIDS: HCPCS | Performed by: STUDENT IN AN ORGANIZED HEALTH CARE EDUCATION/TRAINING PROGRAM

## 2020-11-22 PROCEDURE — 85025 COMPLETE CBC W/AUTO DIFF WBC: CPT

## 2020-11-22 PROCEDURE — 84100 ASSAY OF PHOSPHORUS: CPT

## 2020-11-22 PROCEDURE — 86140 C-REACTIVE PROTEIN: CPT

## 2020-11-22 PROCEDURE — U0002 COVID-19 LAB TEST NON-CDC: HCPCS

## 2020-11-22 PROCEDURE — 80048 BASIC METABOLIC PNL TOTAL CA: CPT

## 2020-11-22 PROCEDURE — 36415 COLL VENOUS BLD VENIPUNCTURE: CPT

## 2020-11-22 PROCEDURE — 25000003 PHARM REV CODE 250: Performed by: SURGERY

## 2020-11-22 RX ADMIN — HYDROMORPHONE HYDROCHLORIDE 0.5 MG: 1 INJECTION, SOLUTION INTRAMUSCULAR; INTRAVENOUS; SUBCUTANEOUS at 10:11

## 2020-11-22 RX ADMIN — AMLODIPINE BESYLATE 5 MG: 5 TABLET ORAL at 08:11

## 2020-11-22 RX ADMIN — SIMETHICONE CHEW TAB 80 MG 80 MG: 80 TABLET ORAL at 02:11

## 2020-11-22 RX ADMIN — METHOCARBAMOL 500 MG: 500 TABLET ORAL at 12:11

## 2020-11-22 RX ADMIN — SODIUM CHLORIDE, SODIUM LACTATE, POTASSIUM CHLORIDE, CALCIUM CHLORIDE, AND DEXTROSE MONOHYDRATE: 600; 310; 30; 20; 5 INJECTION, SOLUTION INTRAVENOUS at 03:11

## 2020-11-22 RX ADMIN — METHOCARBAMOL 500 MG: 500 TABLET ORAL at 09:11

## 2020-11-22 RX ADMIN — SIMETHICONE CHEW TAB 80 MG 80 MG: 80 TABLET ORAL at 06:11

## 2020-11-22 RX ADMIN — SODIUM CHLORIDE, SODIUM LACTATE, POTASSIUM CHLORIDE, CALCIUM CHLORIDE, AND DEXTROSE MONOHYDRATE: 600; 310; 30; 20; 5 INJECTION, SOLUTION INTRAVENOUS at 02:11

## 2020-11-22 RX ADMIN — GABAPENTIN 600 MG: 300 CAPSULE ORAL at 08:11

## 2020-11-22 RX ADMIN — TAMSULOSIN HYDROCHLORIDE 0.4 MG: 0.4 CAPSULE ORAL at 08:11

## 2020-11-22 RX ADMIN — MAGNESIUM SULFATE HEPTAHYDRATE: 500 INJECTION, SOLUTION INTRAMUSCULAR; INTRAVENOUS at 09:11

## 2020-11-22 RX ADMIN — HYDROMORPHONE HYDROCHLORIDE 0.5 MG: 1 INJECTION, SOLUTION INTRAMUSCULAR; INTRAVENOUS; SUBCUTANEOUS at 02:11

## 2020-11-22 RX ADMIN — OXYCODONE HYDROCHLORIDE 5 MG: 5 TABLET ORAL at 08:11

## 2020-11-22 RX ADMIN — FAMOTIDINE 20 MG: 10 INJECTION INTRAVENOUS at 09:11

## 2020-11-22 RX ADMIN — CARVEDILOL 12.5 MG: 12.5 TABLET, FILM COATED ORAL at 09:11

## 2020-11-22 RX ADMIN — ROSUVASTATIN CALCIUM 10 MG: 10 TABLET, FILM COATED ORAL at 09:11

## 2020-11-22 RX ADMIN — HYDROMORPHONE HYDROCHLORIDE 0.5 MG: 1 INJECTION, SOLUTION INTRAMUSCULAR; INTRAVENOUS; SUBCUTANEOUS at 06:11

## 2020-11-22 RX ADMIN — I.V. FAT EMULSION 250 ML: 20 EMULSION INTRAVENOUS at 09:11

## 2020-11-22 RX ADMIN — ASPIRIN 81 MG: 81 TABLET, COATED ORAL at 08:11

## 2020-11-22 RX ADMIN — ENOXAPARIN SODIUM 40 MG: 40 INJECTION SUBCUTANEOUS at 06:11

## 2020-11-22 RX ADMIN — AMITRIPTYLINE HYDROCHLORIDE 50 MG: 50 TABLET, FILM COATED ORAL at 09:11

## 2020-11-22 RX ADMIN — SIMETHICONE CHEW TAB 80 MG 80 MG: 80 TABLET ORAL at 08:11

## 2020-11-22 RX ADMIN — GABAPENTIN 600 MG: 300 CAPSULE ORAL at 09:11

## 2020-11-22 RX ADMIN — CARVEDILOL 12.5 MG: 12.5 TABLET, FILM COATED ORAL at 08:11

## 2020-11-22 NOTE — PLAN OF CARE
Plan of care reviewed with pt. Verbalized understanding. Pt AAOx4. VS stable on RA. Pain managed with PRN medicine.     Old Surgery Abd ML is intact and well approximated. Ileostomy intact putting out liquid stool. Pt empties ileostomy independently. NG tube in L nare to LIWS is intact putting out green drainage.     Pt voids per urinal, adequate output. Pt ambulates independently to BR. Pt ambulated halls x1. Pt remained NPO ex meds. No complaints of nausea. Frequent rounds made for pt safety. Bed low and locked, call light in reach. [facility]

## 2020-11-22 NOTE — PROGRESS NOTES
Pharmacist Intervention IV to PO Note    Jarrett Charlton Jr. is a 70 y.o. male being treated with IV medication famotidine    Patient Data:    Vital Signs (Most Recent):  Temp: 97.8 °F (36.6 °C) (11/22/20 0700)  Pulse: 82 (11/22/20 0700)  Resp: 18 (11/22/20 1019)  BP: (!) 145/71 (11/22/20 0700)  SpO2: (!) 94 % (11/22/20 0700)   Vital Signs (72h Range):  Temp:  [96.4 °F (35.8 °C)-99 °F (37.2 °C)]   Pulse:  [78-99]   Resp:  [16-21]   BP: ()/(67-81)   SpO2:  [87 %-96 %]      CBC:  Recent Labs   Lab 11/16/20  0651 11/19/20  1458 11/22/20  0352   WBC 6.98 8.65 6.00   RBC 3.78* 3.89* 3.41*   HGB 10.5* 10.7* 9.4*   HCT 33.9* 34.7* 31.3*    227 213   MCV 90 89 92   MCH 27.8 27.5 27.6   MCHC 31.0* 30.8* 30.0*     CMP:     Recent Labs   Lab 11/15/20  1240  11/19/20  1458 11/20/20  0607 11/21/20  0548 11/22/20  0352   *   < > 137* 122* 134* 137*   CALCIUM 8.7   < > 8.3* 8.6* 7.9* 7.9*   ALBUMIN 2.7*  --  2.6*  --   --   --    PROT 6.1  --  6.0  --   --   --       < > 142 146* 142 141   K 3.3*   < > 3.3* 3.7 3.1* 3.6   CO2 25   < > 35* 36* 35* 33*      < > 99 99 99 99   BUN 12   < > 20 19 15 14   CREATININE 0.9   < > 1.3 1.2 1.1 1.0   ALKPHOS 111  --  104  --   --   --    ALT 14  --  13  --   --   --    AST 15  --  15  --   --   --    BILITOT 0.8  --  0.7  --   --   --     < > = values in this interval not displayed.       Dietary Orders:  Diet Orders            Standard [employer] ADULT TPN for patient with NO electrolyte abnormality or NO renal dysfunction (PERIPHERAL) at 42 mL/hr starting at 11/22 2200    Diet NPO Except for: Medication, Ice Chips, Sips with Medication: NPO starting at 11/21 1615    Dietary nutrition supplements [facility]; Boost Plus - Chocolate starting at 11/17 1300            Based on the following criteria, this patient qualifies for intravenous to oral conversion:  [x] The patients gastrointestinal tract is functioning (tolerating medications via oral or  enteral route for 24 hours and tolerating food or enteral feeds for 24 hours).  [x] The patient is hemodynamically stable for 24 hours (heart rate <100 beats per minute, systolic blood pressure >99 mm Hg, and respiratory rate <20 breaths per minute).  [x] The patient shows clinical improvement (afebrile for at least 24 hours and white blood cell count downtrending or normalized). Additionally, the patient must be non-neutropenic (absolute neutrophil count >500 cells/mm3).  [x] For antimicrobials, the patient has received IV therapy for at least 24 hours.    IV medication famotidine 20 mg daily will be changed to oral medication famotidine 20 mg daily     Pharmacist's Name: Cherelle Boudreaux  Pharmacist's Extension: [phone]

## 2020-11-22 NOTE — SUBJECTIVE & OBJECTIVE
Interval History: Patient seen and examined this morning. Copious output from NG tube. CT scan yesterday showing anterior abdominal fluid collection; IR consulted. Plan for IR drain placement tomorrow. PICC team consulted for line placement for TPN. Patient continues to endorse nausea.     Medications:  Continuous Infusions:   dextrose 5% lactated ringers 75 mL/hr at 11/22/20 0214     Scheduled Meds:   amitriptyline  50 mg Oral QHS    amLODIPine  5 mg Oral Daily    aspirin  81 mg Oral Daily    carvediloL  12.5 mg Oral BID    enoxaparin  40 mg Subcutaneous Q24H    famotidine (PF)  20 mg Intravenous Daily    gabapentin  600 mg Oral BID    rosuvastatin  10 mg Oral QHS    simethicone  1 tablet Oral TID PC    tamsulosin  0.4 mg Oral Daily     PRN Meds:acetaminophen, HYDROmorphone, melatonin, methocarbamoL, ondansetron, oxyCODONE, sodium chloride 0.9%     Review of patient's allergies indicates:  No Known Allergies  Objective:     Vital Signs (Most Recent):  Temp: 97.8 °F (36.6 °C) (11/22/20 0700)  Pulse: 82 (11/22/20 0700)  Resp: 16 (11/22/20 0700)  BP: (!) 145/71 (11/22/20 0700)  SpO2: (!) 94 % (11/22/20 0700) Vital Signs (24h Range):  Temp:  [96.4 °F (35.8 °C)-98.6 °F (37 °C)] 97.8 °F (36.6 °C)  Pulse:  [78-97] 82  Resp:  [16-21] 16  SpO2:  [93 %-95 %] 94 %  BP: (116-145)/(69-79) 145/71     Weight: 95.3 kg (210 lb 1.6 oz)  Body mass index is 26.26 kg/m².    Intake/Output - Last 3 Shifts       11/20 0700 - 11/21 0659 11/21 0700 - 11/22 0659 11/22 0700 - 11/23 0659    P.O. 840 50     I.V. (mL/kg) 2972.9 (31.2) 1195 (12.5)     NG/GT  500     IV Piggyback 500      Total Intake(mL/kg) 4312.9 (45.3) 1745 (18.3)     Urine (mL/kg/hr) 800 (0.3) 1225 (0.5)     Emesis/NG output 0      Drains 1700 1650     Stool 375 725     Total Output 2875 3600     Net +1437.9 -1855            Urine Occurrence 0 x      Stool Occurrence 0 x 1 x     Emesis Occurrence 0 x            Physical Exam  Constitutional:       Appearance: Normal  appearance.   HENT:      Head: Normocephalic and atraumatic.      Nose:      Comments: NG in place; bilious output  Cardiovascular:      Rate and Rhythm: Normal rate and regular rhythm.   Pulmonary:      Effort: Pulmonary effort is normal. No respiratory distress.   Abdominal:      Comments: Abdomen soft, mild distension; mild tenderness to palpation  Midline incision healing appropriately  RLQ with ileostomy, healthy pink mucosa, liquid stool appliance   Musculoskeletal:      Right lower leg: No edema.      Left lower leg: No edema.   Skin:     General: Skin is warm and dry.   Neurological:      General: No focal deficit present.      Mental Status: He is alert and oriented to person, place, and time.   Psychiatric:         Mood and Affect: Mood normal.         Behavior: Behavior normal.         Significant Labs:  CBC:   Recent Labs   Lab 11/22/20  0352   WBC 6.00   RBC 3.41*   HGB 9.4*   HCT 31.3*      MCV 92   MCH 27.6   MCHC 30.0*     CMP:   Recent Labs   Lab 11/19/20  1458  11/22/20  0352   *   < > 137*   CALCIUM 8.3*   < > 7.9*   ALBUMIN 2.6*  --   --    PROT 6.0  --   --       < > 141   K 3.3*   < > 3.6   CO2 35*   < > 33*   CL 99   < > 99   BUN 20   < > 14   CREATININE 1.3   < > 1.0   ALKPHOS 104  --   --    ALT 13  --   --    AST 15  --   --    BILITOT 0.7  --   --     < > = values in this interval not displayed.       Significant Diagnostics:  I have reviewed all pertinent imaging results/findings within the past 24 hours.

## 2020-11-22 NOTE — CONSULTS
Radiology Consult    Jarrett Charlton Jr. is a 70 y.o. male with a history of parastomal hernia repair 6 days ago. CT revealed abdominal fluid collection.  IR consulted for abscess drain.  Past Medical History:   Diagnosis Date    Basal cell carcinoma     BPH (benign prostatic hyperplasia)     s/p TURP    Carotid stenosis     Chronic kidney disease     Claudication     Coronary artery disease     DDD (degenerative disc disease) 10/21/2013    Diabetes mellitus with renal complications     Disc disease, degenerative, cervical     Encounter for blood transfusion     GERD (gastroesophageal reflux disease)     Gout, chronic     History of ulcerative colitis     s/p colectomy and ileostomy    HLD (hyperlipidemia)     HTN (hypertension)     Ileostomy in place 1982    RBBB     Squamous cell carcinoma 03/08/2018    Left superior helix near insertion    Squamous cell carcinoma 04/12/2018    Left forearm x 5    Ventricular tachycardia      Past Surgical History:   Procedure Laterality Date    cardiac stents      CATARACT EXTRACTION Bilateral     CERVICAL FUSION      colectomy and ileostomy  1985    LYSIS OF ADHESIONS N/A 11/9/2020    Procedure: LYSIS, ADHESIONS,  ERAS low;  Surgeon: CED Haley MD;  Location: [facility] OR 2ND FLR;  Service: Colon and Rectal;  Laterality: N/A;    REPAIR, HERNIA, PARASTOMAL N/A 11/9/2020    Procedure: REPAIR, HERNIA, PARASTOMAL;  Surgeon: CED Haley MD;  Location: [facility] OR 2ND FLR;  Service: Colon and Rectal;  Laterality: N/A;    TRANSURETHRAL RESECTION OF PROSTATE (TURP) WITHOUT USE OF LASER N/A 1/23/2019    Procedure: TURP, WITHOUT USING LASER BIPOLAR;  Surgeon: Catarino Mota MD;  Location: [facility] OR 1ST FLR;  Service: Urology;  Laterality: N/A;  1.5 HOURS       Discussed with primary team, Dr. Nuñez.    Imaging reviewed with Radiology staff, Dr. Rodriguez.     Procedure: abscess drainage    Scheduled Meds:    amitriptyline  50 mg Oral QHS     amLODIPine  5 mg Oral Daily    aspirin  81 mg Oral Daily    carvediloL  12.5 mg Oral BID    enoxaparin  40 mg Subcutaneous Q24H    famotidine (PF)  20 mg Intravenous Daily    gabapentin  600 mg Oral BID    rosuvastatin  10 mg Oral QHS    simethicone  1 tablet Oral TID PC    tamsulosin  0.4 mg Oral Daily     Continuous Infusions:    dextrose 5% lactated ringers 75 mL/hr at 11/21/20 1304     PRN Meds:acetaminophen, HYDROmorphone, melatonin, methocarbamoL, ondansetron, oxyCODONE, sodium chloride 0.9%    Allergies: Review of patient's allergies indicates:  No Known Allergies    Labs:  No results for input(s): INR in the last 168 hours.    Invalid input(s):  PT,  PTT    Recent Labs   Lab 11/19/20  1458   WBC 8.65   HGB 10.7*   HCT 34.7*   MCV 89         Recent Labs   Lab 11/19/20  1458  11/21/20  0548   *   < > 134*      < > 142   K 3.3*   < > 3.1*   CL 99   < > 99   CO2 35*   < > 35*   BUN 20   < > 15   CREATININE 1.3   < > 1.1   CALCIUM 8.3*   < > 7.9*   ALT 13  --   --    AST 15  --   --    ALBUMIN 2.6*  --   --    BILITOT 0.7  --   --     < > = values in this interval not displayed.         Vitals (Most Recent):  Temp: 98.1 °F (36.7 °C) (11/21/20 1453)  Pulse: 94 (11/21/20 1453)  Resp: 18 (11/21/20 1458)  BP: 139/73 (11/21/20 1453)  SpO2: (!) 94 % (11/21/20 1453)    Plan:   Abscess drain planned for 11/23  NPO at midnight prior  Hold anticoagulation    Waldemar Young M.D.  PGY-III Radiology  Pager: [phone]

## 2020-11-22 NOTE — PROGRESS NOTES
[facility]  General Surgery  Progress Note    Subjective:     History of Present Illness:  Jarrett Charlton Jr. is a 70 y.o. M with hx of UC s/p total proctocolectomy and now 6 days post op from ex lap, lysis of adhesions, parastomal hernia repair with mesh (sugarbaker) who presents to the ED with bloating and decreased ileostomy output.  He had presented 2 nights ago for the same.  He states that he is afraid to eat.  Having pain around his ileostomy site.  In the ED, labs grossly unremarkable.  CT scan shows no signs of obstruction, fluid collection around mesh unchanged from previous scan.  CRS was consulted for evaluation     Post-Op Info:  * No surgery found *         Interval History: Patient seen and examined this morning. Copious output from NG tube. CT scan yesterday showing anterior abdominal fluid collection; IR consulted. Plan for IR drain placement tomorrow. PICC team consulted for line placement for TPN. Patient continues to endorse nausea.     Medications:  Continuous Infusions:   dextrose 5% lactated ringers 75 mL/hr at 11/22/20 0214     Scheduled Meds:   amitriptyline  50 mg Oral QHS    amLODIPine  5 mg Oral Daily    aspirin  81 mg Oral Daily    carvediloL  12.5 mg Oral BID    enoxaparin  40 mg Subcutaneous Q24H    famotidine (PF)  20 mg Intravenous Daily    gabapentin  600 mg Oral BID    rosuvastatin  10 mg Oral QHS    simethicone  1 tablet Oral TID PC    tamsulosin  0.4 mg Oral Daily     PRN Meds:acetaminophen, HYDROmorphone, melatonin, methocarbamoL, ondansetron, oxyCODONE, sodium chloride 0.9%     Review of patient's allergies indicates:  No Known Allergies  Objective:     Vital Signs (Most Recent):  Temp: 97.8 °F (36.6 °C) (11/22/20 0700)  Pulse: 82 (11/22/20 0700)  Resp: 16 (11/22/20 0700)  BP: (!) 145/71 (11/22/20 0700)  SpO2: (!) 94 % (11/22/20 0700) Vital Signs (24h Range):  Temp:  [96.4 °F (35.8 °C)-98.6 °F (37 °C)] 97.8 °F (36.6 °C)  Pulse:  [78-97]  82  Resp:  [16-21] 16  SpO2:  [93 %-95 %] 94 %  BP: (116-145)/(69-79) 145/71     Weight: 95.3 kg (210 lb 1.6 oz)  Body mass index is 26.26 kg/m².    Intake/Output - Last 3 Shifts       11/20 0700 - 11/21 0659 11/21 0700 - 11/22 0659 11/22 0700 - 11/23 0659    P.O. 840 50     I.V. (mL/kg) 2972.9 (31.2) 1195 (12.5)     NG/GT  500     IV Piggyback 500      Total Intake(mL/kg) 4312.9 (45.3) 1745 (18.3)     Urine (mL/kg/hr) 800 (0.3) 1225 (0.5)     Emesis/NG output 0      Drains 1700 1650     Stool 375 725     Total Output 2875 3600     Net +1437.9 -1855            Urine Occurrence 0 x      Stool Occurrence 0 x 1 x     Emesis Occurrence 0 x            Physical Exam  Constitutional:       Appearance: Normal appearance.   HENT:      Head: Normocephalic and atraumatic.      Nose:      Comments: NG in place; bilious output  Cardiovascular:      Rate and Rhythm: Normal rate and regular rhythm.   Pulmonary:      Effort: Pulmonary effort is normal. No respiratory distress.   Abdominal:      Comments: Abdomen soft, mild distension; mild tenderness to palpation  Midline incision healing appropriately  RLQ with ileostomy, healthy pink mucosa, liquid stool appliance   Musculoskeletal:      Right lower leg: No edema.      Left lower leg: No edema.   Skin:     General: Skin is warm and dry.   Neurological:      General: No focal deficit present.      Mental Status: He is alert and oriented to person, place, and time.   Psychiatric:         Mood and Affect: Mood normal.         Behavior: Behavior normal.         Significant Labs:  CBC:   Recent Labs   Lab 11/22/20  0352   WBC 6.00   RBC 3.41*   HGB 9.4*   HCT 31.3*      MCV 92   MCH 27.6   MCHC 30.0*     CMP:   Recent Labs   Lab 11/19/20  1458  11/22/20  0352   *   < > 137*   CALCIUM 8.3*   < > 7.9*   ALBUMIN 2.6*  --   --    PROT 6.0  --   --       < > 141   K 3.3*   < > 3.6   CO2 35*   < > 33*   CL 99   < > 99   BUN 20   < > 14   CREATININE 1.3   < > 1.0    ALKPHOS 104  --   --    ALT 13  --   --    AST 15  --   --    BILITOT 0.7  --   --     < > = values in this interval not displayed.       Significant Diagnostics:  I have reviewed all pertinent imaging results/findings within the past 24 hours.    Assessment/Plan:     Status post repair of ventral hernia  70 y.o. M with hx of UC s/p total proctocolectomy and now s/p ex lap, lysis of adhesions, parastomal hernia repair with mesh (11/09) who presented to ED 11/13 with abdominal pain and decreased ostomy output. Admitted for observation and evaluation.    - patient with 1.6L of bilious NG output over last 24hrs  - continue NG tube [facility]  - continues to endorse baseline nausea  - CBC, CMP within normal limits  - CRP mildly increased this am  - NPO with ice chips  - PICC team consult today for line placement  - plan to start TPN once line placed  - IR consulted for drainage of anterior abdominal wall collection  - plan for drain placement with IR tomorrow  - NPO @ midnight  - continue mIVFs  - Continue home meds   - DVT: KAREN hose, lovenox        Dispo: continue floor care        Waldemar Nuñez MD  General Surgery  [facility]

## 2020-11-22 NOTE — ASSESSMENT & PLAN NOTE
70 y.o. M with hx of UC s/p total proctocolectomy and now s/p ex lap, lysis of adhesions, parastomal hernia repair with mesh (11/09) who presented to ED 11/13 with abdominal pain and decreased ostomy output. Admitted for observation and evaluation.    - patient with 1.6L of bilious NG output over last 24hrs  - continue NG tube at [facility]  - continues to endorse baseline nausea  - CBC, CMP within normal limits  - CRP mildly increased this am  - NPO with ice chips  - PICC team consult today for line placement  - plan to start TPN once line placed  - IR consulted for drainage of anterior abdominal wall collection  - plan for drain placement with IR tomorrow  - NPO @ midnight  - continue mIVFs  - Continue home meds   - DVT: KAREN hose, lovenox        Dispo: continue floor care

## 2020-11-22 NOTE — PLAN OF CARE
POC reviewed with patient. AAOX4. VSS. ML ABD incision C/D/I. RUQ ileostomy with 200ml output, pt. Empties independently. Voided per urinal, la urine. Diet, NPO with ice chips. Pain level at a 8, prn dilaudid given. NG to KALEN FERRIS with 900ml output during shift. Off unit to CT scan of abdomen around 1400. Contrast given through NG tube. Ambulated independently in halls. Call light in reach with frequent monitoring.

## 2020-11-23 LAB
ALBUMIN SERPL BCP-MCNC: 2.1 G/DL (ref 3.5–5.2)
ALP SERPL-CCNC: 100 U/L (ref 55–135)
ALT SERPL W/O P-5'-P-CCNC: 14 U/L (ref 10–44)
ANION GAP SERPL CALC-SCNC: 9 MMOL/L (ref 8–16)
APPEARANCE FLD: ABNORMAL
AST SERPL-CCNC: 17 U/L (ref 10–40)
BASOPHILS # BLD AUTO: 0.01 K/UL (ref 0–0.2)
BASOPHILS NFR BLD: 0.2 % (ref 0–1.9)
BILIRUB SERPL-MCNC: 0.6 MG/DL (ref 0.1–1)
BODY FLD TYPE: ABNORMAL
BUN SERPL-MCNC: 11 MG/DL (ref 8–23)
CALCIUM SERPL-MCNC: 7.8 MG/DL (ref 8.7–10.5)
CHLORIDE SERPL-SCNC: 102 MMOL/L (ref 95–110)
CO2 SERPL-SCNC: 29 MMOL/L (ref 23–29)
COLOR FLD: ABNORMAL
CREAT SERPL-MCNC: 1 MG/DL (ref 0.5–1.4)
CRP SERPL-MCNC: 95.84 MG/L (ref 0–3.19)
CRP SERPL-MCNC: 99.54 MG/L (ref 0–3.19)
CRP SERPL-MCNC: 99.8 MG/L (ref 0–8.2)
DIFFERENTIAL METHOD: ABNORMAL
EOSINOPHIL # BLD AUTO: 0.1 K/UL (ref 0–0.5)
EOSINOPHIL NFR BLD: 1.9 % (ref 0–8)
EOSINOPHIL NFR FLD MANUAL: 2 %
ERYTHROCYTE [DISTWIDTH] IN BLOOD BY AUTOMATED COUNT: 14.2 % (ref 11.5–14.5)
EST. GFR  (AFRICAN AMERICAN): >60 ML/MIN/1.73 M^2
EST. GFR  (NON AFRICAN AMERICAN): >60 ML/MIN/1.73 M^2
GLUCOSE SERPL-MCNC: 260 MG/DL (ref 70–110)
GRAM STN SPEC: NORMAL
GRAM STN SPEC: NORMAL
HCT VFR BLD AUTO: 31.1 % (ref 40–54)
HGB BLD-MCNC: 9.6 G/DL (ref 14–18)
IMM GRANULOCYTES # BLD AUTO: 0.01 K/UL (ref 0–0.04)
IMM GRANULOCYTES NFR BLD AUTO: 0.2 % (ref 0–0.5)
LYMPHOCYTES # BLD AUTO: 0.7 K/UL (ref 1–4.8)
LYMPHOCYTES NFR BLD: 13.6 % (ref 18–48)
LYMPHOCYTES NFR FLD MANUAL: 42 %
MAGNESIUM SERPL-MCNC: 1.7 MG/DL (ref 1.6–2.6)
MCH RBC QN AUTO: 27.6 PG (ref 27–31)
MCHC RBC AUTO-ENTMCNC: 30.9 G/DL (ref 32–36)
MCV RBC AUTO: 89 FL (ref 82–98)
MONOCYTES # BLD AUTO: 0.4 K/UL (ref 0.3–1)
MONOCYTES NFR BLD: 7.7 % (ref 4–15)
MONOS+MACROS NFR FLD MANUAL: 38 %
NEUTROPHILS # BLD AUTO: 4.1 K/UL (ref 1.8–7.7)
NEUTROPHILS NFR BLD: 76.4 % (ref 38–73)
NEUTROPHILS NFR FLD MANUAL: 13 %
NRBC BLD-RTO: 0 /100 WBC
OTHER CELLS FLD MANUAL: 5 %
PHOSPHATE SERPL-MCNC: 3.6 MG/DL (ref 2.7–4.5)
PLATELET # BLD AUTO: 213 K/UL (ref 150–350)
PMV BLD AUTO: 10.3 FL (ref 9.2–12.9)
POTASSIUM SERPL-SCNC: 3.4 MMOL/L (ref 3.5–5.1)
PREALB SERPL-MCNC: 8 MG/DL (ref 20–43)
PROT SERPL-MCNC: 5.2 G/DL (ref 6–8.4)
RBC # BLD AUTO: 3.48 M/UL (ref 4.6–6.2)
SODIUM SERPL-SCNC: 140 MMOL/L (ref 136–145)
WBC # BLD AUTO: 5.3 K/UL (ref 3.9–12.7)
WBC # FLD: 358 /CU MM

## 2020-11-23 PROCEDURE — 85025 COMPLETE CBC W/AUTO DIFF WBC: CPT

## 2020-11-23 PROCEDURE — 87102 FUNGUS ISOLATION CULTURE: CPT

## 2020-11-23 PROCEDURE — 25000003 PHARM REV CODE 250: Performed by: STUDENT IN AN ORGANIZED HEALTH CARE EDUCATION/TRAINING PROGRAM

## 2020-11-23 PROCEDURE — 83735 ASSAY OF MAGNESIUM: CPT

## 2020-11-23 PROCEDURE — 84100 ASSAY OF PHOSPHORUS: CPT

## 2020-11-23 PROCEDURE — 63600175 PHARM REV CODE 636 W HCPCS: Performed by: STUDENT IN AN ORGANIZED HEALTH CARE EDUCATION/TRAINING PROGRAM

## 2020-11-23 PROCEDURE — 36415 COLL VENOUS BLD VENIPUNCTURE: CPT

## 2020-11-23 PROCEDURE — 25000003 PHARM REV CODE 250: Performed by: COLON & RECTAL SURGERY

## 2020-11-23 PROCEDURE — 25000003 PHARM REV CODE 250: Performed by: SURGERY

## 2020-11-23 PROCEDURE — 80053 COMPREHEN METABOLIC PANEL: CPT

## 2020-11-23 PROCEDURE — 86141 C-REACTIVE PROTEIN HS: CPT | Mod: 91

## 2020-11-23 PROCEDURE — 63600175 PHARM REV CODE 636 W HCPCS: Performed by: SURGERY

## 2020-11-23 PROCEDURE — 36573 INSJ PICC RS&I 5 YR+: CPT

## 2020-11-23 PROCEDURE — 89051 BODY FLUID CELL COUNT: CPT

## 2020-11-23 PROCEDURE — A4217 STERILE WATER/SALINE, 500 ML: HCPCS | Performed by: SURGERY

## 2020-11-23 PROCEDURE — A4216 STERILE WATER/SALINE, 10 ML: HCPCS | Performed by: COLON & RECTAL SURGERY

## 2020-11-23 PROCEDURE — 20600001 HC STEP DOWN PRIVATE ROOM

## 2020-11-23 PROCEDURE — 87205 SMEAR GRAM STAIN: CPT

## 2020-11-23 PROCEDURE — B4185 PARENTERAL SOL 10 GM LIPIDS: HCPCS | Performed by: SURGERY

## 2020-11-23 PROCEDURE — 63600175 PHARM REV CODE 636 W HCPCS: Performed by: RADIOLOGY

## 2020-11-23 PROCEDURE — 84134 ASSAY OF PREALBUMIN: CPT

## 2020-11-23 PROCEDURE — C1751 CATH, INF, PER/CENT/MIDLINE: HCPCS

## 2020-11-23 PROCEDURE — 86140 C-REACTIVE PROTEIN: CPT

## 2020-11-23 PROCEDURE — 87075 CULTR BACTERIA EXCEPT BLOOD: CPT

## 2020-11-23 PROCEDURE — 87070 CULTURE OTHR SPECIMN AEROBIC: CPT

## 2020-11-23 PROCEDURE — 76937 US GUIDE VASCULAR ACCESS: CPT

## 2020-11-23 RX ORDER — COLCHICINE 0.6 MG/1
1.2 TABLET, FILM COATED ORAL ONCE
Status: COMPLETED | OUTPATIENT
Start: 2020-11-23 | End: 2020-11-23

## 2020-11-23 RX ORDER — COLCHICINE 0.6 MG/1
0.6 TABLET, FILM COATED ORAL DAILY
Status: DISCONTINUED | OUTPATIENT
Start: 2020-11-24 | End: 2020-11-30 | Stop reason: HOSPADM

## 2020-11-23 RX ORDER — MIDAZOLAM HYDROCHLORIDE 1 MG/ML
INJECTION INTRAMUSCULAR; INTRAVENOUS CODE/TRAUMA/SEDATION MEDICATION
Status: COMPLETED | OUTPATIENT
Start: 2020-11-23 | End: 2020-11-23

## 2020-11-23 RX ORDER — SODIUM CHLORIDE 0.9 % (FLUSH) 0.9 %
10 SYRINGE (ML) INJECTION
Status: DISCONTINUED | OUTPATIENT
Start: 2020-11-23 | End: 2020-11-30 | Stop reason: HOSPADM

## 2020-11-23 RX ORDER — SODIUM CHLORIDE 0.9 % (FLUSH) 0.9 %
10 SYRINGE (ML) INJECTION EVERY 6 HOURS
Status: DISCONTINUED | OUTPATIENT
Start: 2020-11-23 | End: 2020-11-30 | Stop reason: HOSPADM

## 2020-11-23 RX ORDER — MUPIROCIN 20 MG/G
OINTMENT TOPICAL 2 TIMES DAILY
Status: COMPLETED | OUTPATIENT
Start: 2020-11-23 | End: 2020-11-27

## 2020-11-23 RX ORDER — FAMOTIDINE 10 MG/ML
40 INJECTION INTRAVENOUS DAILY
Status: DISCONTINUED | OUTPATIENT
Start: 2020-11-23 | End: 2020-11-26

## 2020-11-23 RX ORDER — FENTANYL CITRATE 50 UG/ML
INJECTION, SOLUTION INTRAMUSCULAR; INTRAVENOUS CODE/TRAUMA/SEDATION MEDICATION
Status: COMPLETED | OUTPATIENT
Start: 2020-11-23 | End: 2020-11-23

## 2020-11-23 RX ADMIN — SIMETHICONE CHEW TAB 80 MG 80 MG: 80 TABLET ORAL at 07:11

## 2020-11-23 RX ADMIN — OXYCODONE HYDROCHLORIDE 5 MG: 5 TABLET ORAL at 10:11

## 2020-11-23 RX ADMIN — HYDROMORPHONE HYDROCHLORIDE 0.5 MG: 1 INJECTION, SOLUTION INTRAMUSCULAR; INTRAVENOUS; SUBCUTANEOUS at 10:11

## 2020-11-23 RX ADMIN — HYDROMORPHONE HYDROCHLORIDE 0.5 MG: 1 INJECTION, SOLUTION INTRAMUSCULAR; INTRAVENOUS; SUBCUTANEOUS at 04:11

## 2020-11-23 RX ADMIN — GABAPENTIN 600 MG: 300 CAPSULE ORAL at 09:11

## 2020-11-23 RX ADMIN — FENTANYL CITRATE 50 MCG: 50 INJECTION, SOLUTION INTRAMUSCULAR; INTRAVENOUS at 08:11

## 2020-11-23 RX ADMIN — SODIUM CHLORIDE, SODIUM LACTATE, POTASSIUM CHLORIDE, CALCIUM CHLORIDE, AND DEXTROSE MONOHYDRATE: 600; 310; 30; 20; 5 INJECTION, SOLUTION INTRAVENOUS at 05:11

## 2020-11-23 RX ADMIN — MIDAZOLAM HYDROCHLORIDE 1 MG: 1 INJECTION, SOLUTION INTRAMUSCULAR; INTRAVENOUS at 08:11

## 2020-11-23 RX ADMIN — AMITRIPTYLINE HYDROCHLORIDE 50 MG: 50 TABLET, FILM COATED ORAL at 09:11

## 2020-11-23 RX ADMIN — TAMSULOSIN HYDROCHLORIDE 0.4 MG: 0.4 CAPSULE ORAL at 10:11

## 2020-11-23 RX ADMIN — SIMETHICONE CHEW TAB 80 MG 80 MG: 80 TABLET ORAL at 10:11

## 2020-11-23 RX ADMIN — ROSUVASTATIN CALCIUM 10 MG: 10 TABLET, FILM COATED ORAL at 09:11

## 2020-11-23 RX ADMIN — ENOXAPARIN SODIUM 40 MG: 40 INJECTION SUBCUTANEOUS at 04:11

## 2020-11-23 RX ADMIN — MAGNESIUM SULFATE HEPTAHYDRATE: 500 INJECTION, SOLUTION INTRAMUSCULAR; INTRAVENOUS at 10:11

## 2020-11-23 RX ADMIN — ASPIRIN 81 MG: 81 TABLET, COATED ORAL at 10:11

## 2020-11-23 RX ADMIN — AMLODIPINE BESYLATE 5 MG: 5 TABLET ORAL at 10:11

## 2020-11-23 RX ADMIN — GABAPENTIN 600 MG: 300 CAPSULE ORAL at 10:11

## 2020-11-23 RX ADMIN — COLCHICINE 1.2 MG: 0.6 TABLET, FILM COATED ORAL at 07:11

## 2020-11-23 RX ADMIN — CARVEDILOL 12.5 MG: 12.5 TABLET, FILM COATED ORAL at 10:11

## 2020-11-23 RX ADMIN — MUPIROCIN: 20 OINTMENT TOPICAL at 01:11

## 2020-11-23 RX ADMIN — Medication 10 ML: at 06:11

## 2020-11-23 RX ADMIN — HYDROMORPHONE HYDROCHLORIDE 0.5 MG: 1 INJECTION, SOLUTION INTRAMUSCULAR; INTRAVENOUS; SUBCUTANEOUS at 09:11

## 2020-11-23 RX ADMIN — METHOCARBAMOL 500 MG: 500 TABLET ORAL at 10:11

## 2020-11-23 RX ADMIN — Medication 10 ML: at 12:11

## 2020-11-23 RX ADMIN — FAMOTIDINE 40 MG: 10 INJECTION INTRAVENOUS at 10:11

## 2020-11-23 RX ADMIN — CARVEDILOL 12.5 MG: 12.5 TABLET, FILM COATED ORAL at 09:11

## 2020-11-23 RX ADMIN — MUPIROCIN: 20 OINTMENT TOPICAL at 09:11

## 2020-11-23 RX ADMIN — I.V. FAT EMULSION 250 ML: 20 EMULSION INTRAVENOUS at 10:11

## 2020-11-23 RX ADMIN — SIMETHICONE CHEW TAB 80 MG 80 MG: 80 TABLET ORAL at 02:11

## 2020-11-23 NOTE — PLAN OF CARE
Pt arrived to [facility] bay 2, no acute distress noted. Dressing CDI, report received from GHASSAN Pepe. Will continue to monitor.

## 2020-11-23 NOTE — SUBJECTIVE & OBJECTIVE
Interval History: NAEON. TPN started. Plan for IR drainage of abdominal wall fluid collection today, will monitor for increased ostomy output after.     Medications:  Continuous Infusions:   dextrose 5% lactated ringers 75 mL/hr at 11/23/20 0512    Standard Custom Day One ADULT TPN for patient with NO electrolyte abnormality or NO renal dysfunction (PERIPHERAL) 42 mL/hr at 11/22/20 2145     Scheduled Meds:   amitriptyline  50 mg Oral QHS    amLODIPine  5 mg Oral Daily    aspirin  81 mg Oral Daily    carvediloL  12.5 mg Oral BID    enoxaparin  40 mg Subcutaneous Q24H    famotidine (PF)  40 mg Intravenous Daily    fat emulsion 20%  250 mL Intravenous Daily    gabapentin  600 mg Oral BID    rosuvastatin  10 mg Oral QHS    simethicone  1 tablet Oral TID PC    tamsulosin  0.4 mg Oral Daily     PRN Meds:acetaminophen, HYDROmorphone, melatonin, methocarbamoL, ondansetron, oxyCODONE, sodium chloride 0.9%     Review of patient's allergies indicates:  No Known Allergies  Objective:     Vital Signs (Most Recent):  Temp: 97.9 °F (36.6 °C) (11/23/20 0340)  Pulse: 82 (11/23/20 0340)  Resp: 16 (11/23/20 0340)  BP: (!) 161/89 (11/23/20 0340)  SpO2: (!) 93 % (11/23/20 0340) Vital Signs (24h Range):  Temp:  [96.7 °F (35.9 °C)-99.1 °F (37.3 °C)] 97.9 °F (36.6 °C)  Pulse:  [] 82  Resp:  [16-18] 16  SpO2:  [93 %-96 %] 93 %  BP: (108-161)/(67-89) 161/89     Weight: 95.3 kg (210 lb 1.6 oz)  Body mass index is 26.26 kg/m².    Intake/Output - Last 3 Shifts       11/21 0700 - 11/22 0659 11/22 0700 - 11/23 0659 11/23 0700 - 11/24 0659    P.O. 50 50     I.V. (mL/kg) 1195 (12.5) 1815 (19)     NG/      IV Piggyback       TPN  467.9     Total Intake(mL/kg) 1745 (18.3) 2332.9 (24.5)     Urine (mL/kg/hr) 1225 (0.5) 1100 (0.5)     Emesis/NG output       Drains 1650 1300     Stool 725 425     Total Output 3600 2825     Net -1855 -492.1            Stool Occurrence 1 x 1 x           Physical Exam  HENT:      Nose:       Comments: NG in place, draining bilious output  Cardiovascular:      Rate and Rhythm: Normal rate and regular rhythm.   Abdominal:      General: Abdomen is flat.      Palpations: Abdomen is soft.      Comments: RLQ ostomy with some soft stool  Soft, non-distended abdomen with some tenderness to palpation     Musculoskeletal:      Comments: PICC to right arm, TPN infusing   Skin:     General: Skin is warm and dry.   Neurological:      General: No focal deficit present.      Mental Status: He is oriented to person, place, and time.   Psychiatric:         Mood and Affect: Mood normal.         Behavior: Behavior normal.         Significant Labs:  CBC:   Recent Labs   Lab 11/22/20  0352   WBC 6.00   RBC 3.41*   HGB 9.4*   HCT 31.3*      MCV 92   MCH 27.6   MCHC 30.0*     CMP:   Recent Labs   Lab 11/19/20  1458  11/22/20  0352   *   < > 137*   CALCIUM 8.3*   < > 7.9*   ALBUMIN 2.6*  --   --    PROT 6.0  --   --       < > 141   K 3.3*   < > 3.6   CO2 35*   < > 33*   CL 99   < > 99   BUN 20   < > 14   CREATININE 1.3   < > 1.0   ALKPHOS 104  --   --    ALT 13  --   --    AST 15  --   --    BILITOT 0.7  --   --     < > = values in this interval not displayed.       Significant Diagnostics:  I have reviewed all pertinent imaging results/findings within the past 24 hours.

## 2020-11-23 NOTE — NURSING TRANSFER
Nursing Transfer Note      11/23/2020     Transfer To: 1003    Transfer via stretcher    Transfer with IV pole    Transported by transport team    Chart send with patient: Yes

## 2020-11-23 NOTE — PLAN OF CARE
Pt arrived to IR procedure room 173 for Ct guided abcess drainage and possible tube placement, allergies rev'd, preparing for procedure

## 2020-11-23 NOTE — PROGRESS NOTES
"[facility]  Adult Nutrition  Progress Note    SUMMARY       Recommendations    1. Recommend Custom  gm AA / 300 gm Dex + IV lipids to provide 1920 kcal, 100 gm protein, and GIR of 2.18.     2. As medically able, ADAT to diabetic with texture per SLP.    - If po intake <50%, recommend adding Boost Glucose Control BID.     3. RD following.     Goals: meet >75% EEN/EPN by RD follow-up  Nutrition Goal Status: new  Communication of RD Recs: discussed on rounds    Reason for Assessment    Reason For Assessment: new TPN  Diagnosis: (Abdominal pain)  Relevant Medical History: HTN, HLD, GERD, CKD, T2DM, CAD  Interdisciplinary Rounds: attended  General Information Comments: S/p recent parastomal hernia repair, now with abdominal fluid collection. DEBBIE 2/2 IR procedure. NG tube, NPO, PN + IV lipids ordered. Unable to assess appetite PTA. Per chart review, no significant wt loss. Pt does not meet malnutrition criteria with current information. Will continue to monitor energy intake and wt changes.   Nutrition Discharge Planning: unable to determine at this time    Nutrition Risk Screen    Nutrition Risk Screen: no indicators present    Nutrition/Diet History    Factors Affecting Nutritional Intake: altered gastrointestinal function, NPO    Anthropometrics    Temp: 98.1 °F (36.7 °C)  Height: 6' 3" (190.5 cm)  Height (inches): 75 in  Weight Method: Bed Scale  Weight: 95.3 kg (210 lb 1.6 oz)  Weight (lb): 210.1 lb  Ideal Body Weight (IBW), Male: 196 lb  % Ideal Body Weight, Male (lb): 107.19 %  BMI (Calculated): 26.3  BMI Grade: (P) 25 - 29.9 - overweight     Lab/Procedures/Meds    Pertinent Labs Reviewed: reviewed  Pertinent Labs Comments: glucose 137, .8  Pertinent Medications Reviewed: reviewed  Pertinent Medications Comments: amlodipine, aspirin, carvedilol, statin, IVF    Estimated/Assessed Needs    Weight Used For Calorie Calculations: 95.3 kg (210 lb 1.6 oz)  Energy Calorie Requirements " (kcal): 2242 kcal/day  Energy Need Method: Ripley-St Jeor(x 1.25)  Protein Requirements:  gm/day(1.0-1.2 gm/kg)  Weight Used For Protein Calculations: 95.3 kg (210 lb 1.6 oz)  Fluid Requirements (mL): 1 mL/kcal or per MD     RDA Method (mL): 2242  CHO Requirement: 280 gm    Nutrition Prescription Ordered    Current Diet Order: NPO  Current Nutrition Support Formula Ordered: (PN 28 gm AA / 101 gm Dex + IV lipids)  Current Nutrition Support Rate Ordered: 42 (ml)  Current Nutrition Support Frequency Ordered: mL/hr    Evaluation of Received Nutrient/Fluid Intake    Parenteral Calories (kcal): 454  Parenteral Protein (gm): 28  Parenteral Fluid (mL): 1008  Lipid Calories (kcals): 500 kcals  GIR (Glucose Infusion Rate) (mg/kg/min): 0.73 mg/kg/min  Total Calories (kcal): 954  % Kcal Needs: 43  % Protein Needs: 28  I/O: -2.606L since admit  Energy Calories Required: not meeting needs  Protein Required: not meeting needs  Fluid Required: (per MD)  Comments: LBM 11/22(ostomy)  % Intake of Estimated Energy Needs: 25 - 50 %  % Meal Intake: NPO    Nutrition Risk    Level of Risk/Frequency of Follow-up: low(f/u 1 x wk)     Assessment and Plan    Nutrition Problem  Inadequate Energy Intake     Related to (etiology):   Inadequate Parenteral Nutrition Infusion     Signs and Symptoms (as evidenced by):   PN meeting <50% EEN/EPN     Interventions (treatment strategy):  Collaboration of nutrition care with other providers  Parenteral Nutrition     Nutrition Diagnosis Status:   New     Monitor and Evaluation    Food and Nutrient Intake: energy intake, parenteral nutrition intake  Food and Nutrient Adminstration: enteral and parenteral nutrition administration  Anthropometric Measurements: weight, weight change, body mass index  Biochemical Data, Medical Tests and Procedures: electrolyte and renal panel, gastrointestinal profile, glucose/endocrine profile, inflammatory profile, lipid profile  Nutrition-Focused Physical Findings:  overall appearance     Nutrition Follow-Up    RD Follow-up?: Yes

## 2020-11-23 NOTE — NURSING
Procedure complete, pt tolerated well and without event, LLQ abd drain incision site is clean, dry, intact, and  Without bleeding or hematoma, to bulb suction, thin serosanginous drainage present, escorted under care of RN to [facility] for recovery, bedside handoff provided to WILMA Guzman RN, report called to primary RN

## 2020-11-23 NOTE — PROGRESS NOTES
[facility]  General Surgery  Progress Note    Subjective:     History of Present Illness:  Jarrett Charlton Jr. is a 70 y.o. M with hx of UC s/p total proctocolectomy and now 6 days post op from ex lap, lysis of adhesions, parastomal hernia repair with mesh (sugarbaker) who presents to the ED with bloating and decreased ileostomy output.  He had presented 2 nights ago for the same.  He states that he is afraid to eat.  Having pain around his ileostomy site.  In the ED, labs grossly unremarkable.  CT scan shows no signs of obstruction, fluid collection around mesh unchanged from previous scan.  CRS was consulted for evaluation     Post-Op Info:  * No surgery found *         Interval History: NAEON. TPN started. Plan for IR drainage of abdominal wall fluid collection today, will monitor for increased ostomy output after.     Medications:  Continuous Infusions:   dextrose 5% lactated ringers 75 mL/hr at 11/23/20 0512    Standard Custom Day One ADULT TPN for patient with NO electrolyte abnormality or NO renal dysfunction (PERIPHERAL) 42 mL/hr at 11/22/20 2145     Scheduled Meds:   amitriptyline  50 mg Oral QHS    amLODIPine  5 mg Oral Daily    aspirin  81 mg Oral Daily    carvediloL  12.5 mg Oral BID    enoxaparin  40 mg Subcutaneous Q24H    famotidine (PF)  40 mg Intravenous Daily    fat emulsion 20%  250 mL Intravenous Daily    gabapentin  600 mg Oral BID    rosuvastatin  10 mg Oral QHS    simethicone  1 tablet Oral TID PC    tamsulosin  0.4 mg Oral Daily     PRN Meds:acetaminophen, HYDROmorphone, melatonin, methocarbamoL, ondansetron, oxyCODONE, sodium chloride 0.9%     Review of patient's allergies indicates:  No Known Allergies  Objective:     Vital Signs (Most Recent):  Temp: 97.9 °F (36.6 °C) (11/23/20 0340)  Pulse: 82 (11/23/20 0340)  Resp: 16 (11/23/20 0340)  BP: (!) 161/89 (11/23/20 0340)  SpO2: (!) 93 % (11/23/20 0340) Vital Signs (24h Range):  Temp:  [96.7 °F (35.9 °C)-99.1  °F (37.3 °C)] 97.9 °F (36.6 °C)  Pulse:  [] 82  Resp:  [16-18] 16  SpO2:  [93 %-96 %] 93 %  BP: (108-161)/(67-89) 161/89     Weight: 95.3 kg (210 lb 1.6 oz)  Body mass index is 26.26 kg/m².    Intake/Output - Last 3 Shifts       11/21 0700 - 11/22 0659 11/22 0700 - 11/23 0659 11/23 0700 - 11/24 0659    P.O. 50 50     I.V. (mL/kg) 1195 (12.5) 1815 (19)     NG/      IV Piggyback       TPN  467.9     Total Intake(mL/kg) 1745 (18.3) 2332.9 (24.5)     Urine (mL/kg/hr) 1225 (0.5) 1100 (0.5)     Emesis/NG output       Drains 1650 1300     Stool 725 425     Total Output 3600 2825     Net -1855 -492.1            Stool Occurrence 1 x 1 x           Physical Exam  HENT:      Nose:      Comments: NG in place, draining bilious output  Cardiovascular:      Rate and Rhythm: Normal rate and regular rhythm.   Abdominal:      General: Abdomen is flat.      Palpations: Abdomen is soft.      Comments: RLQ ostomy with some soft stool  Soft, non-distended abdomen with some tenderness to palpation     Skin:     General: Skin is warm and dry.   Neurological:      General: No focal deficit present.      Mental Status: He is oriented to person, place, and time.   Psychiatric:         Mood and Affect: Mood normal.         Behavior: Behavior normal.         Significant Labs:  CBC:   Recent Labs   Lab 11/22/20  0352   WBC 6.00   RBC 3.41*   HGB 9.4*   HCT 31.3*      MCV 92   MCH 27.6   MCHC 30.0*     CMP:   Recent Labs   Lab 11/19/20  1458  11/22/20  0352   *   < > 137*   CALCIUM 8.3*   < > 7.9*   ALBUMIN 2.6*  --   --    PROT 6.0  --   --       < > 141   K 3.3*   < > 3.6   CO2 35*   < > 33*   CL 99   < > 99   BUN 20   < > 14   CREATININE 1.3   < > 1.0   ALKPHOS 104  --   --    ALT 13  --   --    AST 15  --   --    BILITOT 0.7  --   --     < > = values in this interval not displayed.       Significant Diagnostics:  I have reviewed all pertinent imaging results/findings within the past 24 hours.    Assessment/Plan:      Status post repair of ventral hernia  70 y.o. M with hx of UC s/p total proctocolectomy and now s/p ex lap, lysis of adhesions, parastomal hernia repair with mesh (11/09) who presented to ED 11/13 with abdominal pain and decreased ostomy output. Admitted for observation and evaluation.    - patient with 1.3L of bilious NG output over last 24hrs  - continue [facility] tube at [facility]  - continues to endorse baseline nausea  - NPO with ice chips; TPN, mIVF  - PICC placement pending, TPN initiated  - Plan for IR drainage of anterior abdominal wall collection  - Continue home meds   - DVT: KAREN hose, lovenox        Dispo: continue floor care        Fatuma Zapien MD  General Surgery  [facility]

## 2020-11-23 NOTE — PLAN OF CARE
Plan of care reviewed with pt. Verbalized understanding. Pt AAOx4. VS stable on RA. Pain managed with PRN medicine.      Old Surgery Abd ML is intact and well approximated. Ileostomy intact putting out liquid stool. Pt empties ileostomy independently. NG tube in L nare to LIWS is intact putting out green drainage. Peripheral TPN initiated and running per MAR. Pt tolerating well. New PIV started.      Pt voids per urinal, adequate output. Pt ambulates independently to BR. Pt remained NPO ex meds. No complaints of nausea. Frequent rounds made for pt safety. Bed low and locked, call light in reach. WCTM

## 2020-11-23 NOTE — H&P
Inpatient Radiology Pre-procedure Note    History of Present Illness:  Jarrett Charlton Jr. is a 70 y.o. male with a history of parastomal hernia repair 6 days ago. CT revealed abdominal fluid collection.  Presents for drainage of abdominal wall fluid collection.    Admission H&P reviewed.  Past Medical History:   Diagnosis Date    Basal cell carcinoma     BPH (benign prostatic hyperplasia)     s/p TURP    Carotid stenosis     Chronic kidney disease     Claudication     Coronary artery disease     DDD (degenerative disc disease) 10/21/2013    Diabetes mellitus with renal complications     Disc disease, degenerative, cervical     Encounter for blood transfusion     GERD (gastroesophageal reflux disease)     Gout, chronic     History of ulcerative colitis     s/p colectomy and ileostomy    HLD (hyperlipidemia)     HTN (hypertension)     Ileostomy in place 1982    RBBB     Squamous cell carcinoma 03/08/2018    Left superior helix near insertion    Squamous cell carcinoma 04/12/2018    Left forearm x 5    Ventricular tachycardia      Past Surgical History:   Procedure Laterality Date    cardiac stents      CATARACT EXTRACTION Bilateral     CERVICAL FUSION      colectomy and ileostomy  1985    LYSIS OF ADHESIONS N/A 11/9/2020    Procedure: LYSIS, ADHESIONS,  ERAS low;  Surgeon: CED Haley MD;  Location: [facility] OR [address] [place] [address];  Service: Colon and Rectal;  Laterality: N/A;    REPAIR, HERNIA, PARASTOMAL N/A 11/9/2020    Procedure: REPAIR, HERNIA, PARASTOMAL;  Surgeon: CED Haley MD;  [facility];  Service: Colon and Rectal;  Laterality: N/A;    TRANSURETHRAL RESECTION OF PROSTATE (TURP) WITHOUT USE OF LASER N/A 1/23/2019    Procedure: TURP, WITHOUT USING LASER BIPOLAR;  Surgeon: Catarino Mota MD;  Location: [facility] OR [address] [place] [address];  Service: Urology;  Laterality: N/A;  1.5 HOURS       Review of Systems:   As documented in primary team H&P    Home Meds:   Prior to Admission  medications    Medication Sig Start Date End Date Taking? Authorizing Provider   ACCU-CHEK PAUL CONTROL SOLN Soln 1 drop by NOT APPLICABLE route once daily. 2/21/20   Historical Provider   ACCU-CHEK SOFTCLIX LANCETS Misc 1 lancet by NOT APPLICABLE route once daily. 3/19/20   Historical Provider   allopurinoL (ZYLOPRIM) 300 MG tablet Take 1.5 tablets (450 mg total) by mouth once daily. 7/23/20 7/23/21  Reddy Parekh MD   amitriptyline (ELAVIL) 25 MG tablet Take 2 tablets (50 mg total) by mouth every evening. 8/24/20 8/24/21  Poli Gonzalez MD   amLODIPine (NORVASC) 5 MG tablet Take 5 mg by mouth once daily.    Historical Provider   amoxicillin-clavulanate 875-125mg (AUGMENTIN) 875-125 mg per tablet Take 1 tablet by mouth every 12 (twelve) hours. 11/3/20   Poli Gonzalez MD   aspirin (ECOTRIN) 81 MG EC tablet Take 81 mg by mouth once daily.      Historical Provider   BD ALCOHOL SWABS PadM  4/13/18   Historical Provider   blood sugar diagnostic (ONE TOUCH ULTRA TEST) Strp USE ONE STRIP TO CHECK GLUCOSE EVERY DAY 9/15/14   Elvis Ruiz MD   brimonidine 0.2% (ALPHAGAN) 0.2 % Drop INSTILL 1 DROP INTO EACH EYE TWICE DAILY 9/9/19   Historical Provider   carvediloL (COREG) 12.5 MG tablet Take 12.5 mg by mouth 2 (two) times daily.  6/26/20   Historical Provider   clopidogrel (PLAVIX) 75 mg tablet once daily.  9/6/19   Historical Provider   ergocalciferol, vitamin D2, (VITAMIN D ORAL) Take by mouth.    Historical Provider   escitalopram oxalate (LEXAPRO) 10 MG tablet Take 10 mg by mouth once daily. 6/26/20   Historical Provider   gabapentin (NEURONTIN) 300 MG capsule Take 2 capsules (600 mg total) by mouth 2 (two) times daily. 11/12/20 11/12/21  Waldemar Nuñez MD   gabapentin (NEURONTIN) 600 MG tablet Take 1 tablet (600 mg total) by mouth 2 (two) times daily. 6/3/20   Poli Gonzalez MD   glimepiride (AMARYL) 4 MG tablet Take 1 tablet (4 mg total) by mouth daily with breakfast. 9/30/20 9/30/21  Alexandrea Purdy NP    loperamide (IMODIUM A-D) 2 mg Tab Take 2 mg by mouth once daily.    Historical Provider   magnesium oxide (MAG-OX) 400 mg (241.3 mg magnesium) tablet Take 400 mg by mouth 2 (two) times daily. Take 2 tablets (800 mg) two times a day.    Historical Provider   mupirocin (BACTROBAN) 2 % ointment Apply topically 2 (two) times daily. 10/4/20   Poli Gonzalez MD   omeprazole (PRILOSEC) 40 MG capsule Take 1 capsule (40 mg total) by mouth once daily. 4/17/20 4/17/21  Poli Gonzalez MD   rosuvastatin (CRESTOR) 10 MG tablet Take 1 tablet (10 mg total) by mouth every evening. 9/30/20   Alexandrea Purdy NP   simethicone (MYLICON) 125 mg Cap capsule Take 1 capsule (125 mg total) by mouth 4 (four) times daily as needed for Flatulence (gas). 10/2/20   Poli Gonzalez MD   tamsulosin (FLOMAX) 0.4 mg Cap Take 1 capsule (0.4 mg total) by mouth once daily. 11/13/20 11/13/21  Waldemar Nuñez MD     Scheduled Meds:    amitriptyline  50 mg Oral QHS    amLODIPine  5 mg Oral Daily    aspirin  81 mg Oral Daily    carvediloL  12.5 mg Oral BID    enoxaparin  40 mg Subcutaneous Q24H    famotidine (PF)  40 mg Intravenous Daily    fat emulsion 20%  250 mL Intravenous Daily    fat emulsion 20%  250 mL Intravenous Daily    gabapentin  600 mg Oral BID    rosuvastatin  10 mg Oral QHS    simethicone  1 tablet Oral TID PC    tamsulosin  0.4 mg Oral Daily     Continuous Infusions:    dextrose 5% lactated ringers 75 mL/hr at 11/23/20 0512    Standard Custom Day One ADULT TPN for patient with NO electrolyte abnormality or NO renal dysfunction (PERIPHERAL) 42 mL/hr at 11/22/20 2145    TPN ADULT CENTRAL LINE CUSTOM       PRN Meds:acetaminophen, HYDROmorphone, melatonin, methocarbamoL, ondansetron, oxyCODONE, sodium chloride 0.9%  Anticoagulants/Antiplatelets: Lovenox    Allergies: Review of patient's allergies indicates:  No Known Allergies  Sedation Hx: have not been any systemic reactions    Labs:  No results for input(s): INR in the  last 168 hours.    Invalid input(s):  PT,  PTT    Recent Labs   Lab 11/22/20  0352   WBC 6.00   HGB 9.4*   HCT 31.3*   MCV 92         Recent Labs   Lab 11/19/20  1458  11/22/20  0352   *   < > 137*      < > 141   K 3.3*   < > 3.6   CL 99   < > 99   CO2 35*   < > 33*   BUN 20   < > 14   CREATININE 1.3   < > 1.0   CALCIUM 8.3*   < > 7.9*   MG  --   --  2.0   ALT 13  --   --    AST 15  --   --    ALBUMIN 2.6*  --   --    BILITOT 0.7  --   --     < > = values in this interval not displayed.         Vitals:  Temp: 98.1 °F (36.7 °C) (11/23/20 0747)  Pulse: 89 (11/23/20 0747)  Resp: 18 (11/23/20 0747)  BP: 136/82 (11/23/20 0747)  SpO2: (!) 93 % (11/23/20 0747)     Physical Exam:  ASA: 3  Mallampati: 2    General: no acute distress  Mental Status: alert and oriented to person, place and time  HEENT: normocephalic, atraumatic  Chest: unlabored breathing  Heart: regular heart rate  Abdomen: nondistended  Extremity: moves all extremities    Plan:   -drain fluid collection.  - Keep NPO.  - Hold anticoagulation.    Sedation Plan: up to moderate.    Robbin Florian MD MSCR  PGY-2 Radiology Resident

## 2020-11-23 NOTE — PLAN OF CARE
POC reviewed with patient. AAOX4. VSS. ML ABD incision C/D/I. RUQ ileostomy with 100ml output, pt. Empties independently. Voided per urinal, la urine. Diet, NPO with ice chips. Pain level at a 8, prn dilaudid, oxy, robaxin for generalized abdominal pain.  CARLEE FERRIS moderate green output during shift. Educated patient regarding Central line and TPN. Ambulated independently in halls. Call light in reach with frequent monitoring.

## 2020-11-23 NOTE — PROCEDURES
"Jarrett Charlton Jr. is a 70 y.o. male patient.    Temp: 97.1 °F (36.2 °C) (11/23/20 1012)  Pulse: 91 (11/23/20 1012)  Resp: 20 (11/23/20 1024)  BP: (!) 158/83 (11/23/20 1047)  SpO2: 96 % (11/23/20 1012)  Weight: 95.3 kg (210 lb 1.6 oz) (11/17/20 0841)  Height: 6' 3" (190.5 cm) (11/17/20 0841)    PICC  Date/Time: 11/23/2020 11:33 AM  Performed by: Jeane Reyna RN  Assisting provider: Phi Mcdonough RN  Consent Done: Yes  Time out: Immediately prior to procedure a time out was called to verify the correct patient, procedure, equipment, support staff and site/side marked as required  Indications: med administration and vascular access  Anesthesia: local infiltration  Local anesthetic: lidocaine 1% without epinephrine  Anesthetic Total (mL): 3  Description of findings: PICC  Preparation: skin prepped with ChloraPrep  Skin prep agent dried: skin prep agent completely dried prior to procedure  Sterile barriers: all five maximum sterile barriers used - cap, mask, sterile gown, sterile gloves, and large sterile sheet  Hand hygiene: hand hygiene performed prior to central venous catheter insertion  Location details: right brachial  Catheter type: double lumen  Catheter size: 5 Fr  Catheter Length: 43cm    Ultrasound guidance: yes  Vessel Caliber: medium and patent, compressibility normal  Vascular Doppler: not done  Needle advanced into vessel with real time Ultrasound guidance.  Guidewire confirmed in vessel.  Image recorded and saved.  Sterile sheath used.  Number of attempts: 1  Post-procedure: blood return through all ports, chlorhexidine patch and sterile dressing applied  Technical procedures used: 3CG  Specimens: No  Implants: No  Assessment: placement verified by x-ray  Complications: none          Phi Mcdonough  11/23/2020  "

## 2020-11-23 NOTE — PLAN OF CARE
Recommendations    1. Recommend Custom  gm AA / 300 gm Dex + IV lipids to provide 1920 kcal, 100 gm protein, and GIR of 2.18.     2. As medically able, ADAT to diabetic with texture per SLP.    - If po intake <50%, recommend adding Boost Glucose Control BID.     3. RD following.     Goals: meet >75% EEN/EPN by RD follow-up  Nutrition Goal Status: new   no CVAT b/l

## 2020-11-23 NOTE — PHYSICIAN QUERY
PT Name: Jarrett Charlton Jr.  MR #: [id number]     Documentation Clarification      CDS: Brandy Capley, RN  Email: [email or web address]    This form is a permanent document in the medical record.     Query Date: November 23, 2020    By submitting this query, we are merely seeking further clarification of documentation. Please utilize your independent clinical judgment when addressing the question(s) below.    The Medical Record reflects the following:    Supporting Clinical Findings Location in Medical Record     Status post repair of ventral hernia  70 y.o. M with hx of UC s/p total proctocolectomy and now s/p ex lap, lysis of adhesions, parastomal hernia repair with mesh (11/09) who presented to ED 11/13 with abdominal pain and decreased ostomy output. Admitted for observation and evaluation.    No role for drainage of post-operative seroma seen on CT    Pre-procedure diagnosis: Ventral abdominal wall abscess  Post-procedure diagnosis: Same  Indication: Post-operative fluid collection  PROCEDURAL SUMMARY:  - Intraperitoneal drainage catheter placement under CT guidance    Impression:  Percutaneous placement of a 10 Thai drainage catheter into ventral abdominal wall mixed air-fluid collection, yielding 40 mL of serosanguineous fluid.    IR drain placed today.  Appears benign serous fluid.     General surgery progress notes 11/17                IR imaging 11/23                    General surgery progress notes 11/23     Redemonstration of a fluid collection with multiple foci of air measuring up to 12.6 cm in the anterior abdominal wall extending through the ileostomy wall defect, concerning for intra-abdominal abscess.  This collection appears overall unchanged compared to prior exam 11/13/2020.     11/15/2020 12:40 11/16/2020 06:51 11/19/2020 14:58 11/22/2020 03:52 11/23/2020 12:32   WBC 7.54 6.98 8.65 6.00 5.30      CT 11/15          Labs 11/15-11/23                                                                             Please clarify post operative fluid collection:     [  x ] Abdominal wall seroma, not a complication of surgery     [   ] Abdominal wall seroma, complication of surgery     [   ] Abdominal wall abscess, not a complication of surgery     [   ] Abdominal wall abscess, complication of surgery     [   ] Other (please specify): ____________     [  ] Clinically undetermined

## 2020-11-23 NOTE — ASSESSMENT & PLAN NOTE
70 y.o. M with hx of UC s/p total proctocolectomy and now s/p ex lap, lysis of adhesions, parastomal hernia repair with mesh (11/09) who presented to ED 11/13 with abdominal pain and decreased ostomy output. Admitted for observation and evaluation.    - patient with 1.3L of bilious NG output over last 24hrs  - continue NG tube at [facility]  - continues to endorse baseline nausea  - NPO with ice chips; TPN, mIVF  - PICC placed 11/22, TPN initiated  - Plan for IR drainage of anterior abdominal wall collection  - Continue home meds   - DVT: KAREN hose, lovenox        Dispo: continue floor care

## 2020-11-23 NOTE — CONSULTS
Double lumen PICC to right brachial vein.  43 cm in length, 31 cm arm circumference and 0 cm exposed.   Lot # [id number].

## 2020-11-23 NOTE — PHYSICIAN QUERY
PT Name: Jarrett Charlton Jr.  MR #: [id number]     Documentation Clarification      CDS: Brandy Capley, RN  Email: [email or web address]      This form is a permanent document in the medical record.     Query Date: November 23, 2020    By submitting this query, we are merely seeking further clarification of documentation. Please utilize your independent clinical judgment when addressing the question(s) below.    The Medical Record reflects the following:    Supporting Clinical Findings Location in Medical Record     NG placed overnight with approximately 1 L returned at time of placement, over 2 liters total.     Small-bowel follow-through consistent with ileus    Jarrett Charlton Jr. is a 70 y.o. M with hx of UC s/p total proctocolectomy and now 6 days post op from ex lap, lysis of adhesions, parastomal hernia repair with mesh (sugarbaker) who presents to the ED with bloating and decreased ileostomy output.  He had presented 2 nights ago for the same.  He states that he is afraid to eat.  Having pain around his ileostomy site.  In the ED, labs grossly unremarkable.  CT scan shows no signs of obstruction, fluid collection around mesh unchanged from previous scan.      Colon and rectal surgery progress notes 11/19    General surgery progress notes 11/20     Multiple prominent loops of small bowel with slightly diminished motility on fluoroscopic imaging with failure to recognize contrast passage to patient's ostomy.  No definite obstructive process is visualized, however partial obstructive process cannot be completely excluded given lack of visualization of contrast through entirety of small bowel.  Prolonged transit time and decreased motility suggest ileus.   Fluoro 11/18                                                                            Provider, please further specify ileus:     [  x ] Paralytic ileus, not a complication of surgery     [   ] Paralytic ileus, complication of surgery     [   ] Unspecified/other  ileus, not a complication of surgery     [   ] Unspecified/other ileus, complication of surgery     [   ] Other (please specify): ____________     [  ] Clinically undetermined

## 2020-11-24 ENCOUNTER — TELEPHONE (OUTPATIENT)
Dept: RHEUMATOLOGY | Facility: CLINIC | Age: 70
End: 2020-11-24

## 2020-11-24 ENCOUNTER — TELEPHONE (OUTPATIENT)
Dept: UROLOGY | Facility: CLINIC | Age: 70
End: 2020-11-24

## 2020-11-24 LAB
ALBUMIN SERPL BCP-MCNC: 2.1 G/DL (ref 3.5–5.2)
ALP SERPL-CCNC: 108 U/L (ref 55–135)
ALT SERPL W/O P-5'-P-CCNC: 15 U/L (ref 10–44)
ANION GAP SERPL CALC-SCNC: 8 MMOL/L (ref 8–16)
AST SERPL-CCNC: 16 U/L (ref 10–40)
BILIRUB SERPL-MCNC: 0.6 MG/DL (ref 0.1–1)
BUN SERPL-MCNC: 13 MG/DL (ref 8–23)
CALCIUM SERPL-MCNC: 8 MG/DL (ref 8.7–10.5)
CHLORIDE SERPL-SCNC: 99 MMOL/L (ref 95–110)
CO2 SERPL-SCNC: 32 MMOL/L (ref 23–29)
CREAT SERPL-MCNC: 1.1 MG/DL (ref 0.5–1.4)
EST. GFR  (AFRICAN AMERICAN): >60 ML/MIN/1.73 M^2
EST. GFR  (NON AFRICAN AMERICAN): >60 ML/MIN/1.73 M^2
GLUCOSE SERPL-MCNC: 201 MG/DL (ref 70–110)
MAGNESIUM SERPL-MCNC: 1.9 MG/DL (ref 1.6–2.6)
PATH INTERP FLD-IMP: NORMAL
PHOSPHATE SERPL-MCNC: 4.6 MG/DL (ref 2.7–4.5)
PHOSPHATE SERPL-MCNC: 4.6 MG/DL (ref 2.7–4.5)
POCT GLUCOSE: 192 MG/DL (ref 70–110)
POCT GLUCOSE: 197 MG/DL (ref 70–110)
POTASSIUM SERPL-SCNC: 3.2 MMOL/L (ref 3.5–5.1)
PROT SERPL-MCNC: 5.5 G/DL (ref 6–8.4)
SODIUM SERPL-SCNC: 139 MMOL/L (ref 136–145)
TRIGL SERPL-MCNC: 112 MG/DL (ref 30–150)

## 2020-11-24 PROCEDURE — 63600175 PHARM REV CODE 636 W HCPCS: Performed by: STUDENT IN AN ORGANIZED HEALTH CARE EDUCATION/TRAINING PROGRAM

## 2020-11-24 PROCEDURE — 83735 ASSAY OF MAGNESIUM: CPT

## 2020-11-24 PROCEDURE — 36415 COLL VENOUS BLD VENIPUNCTURE: CPT

## 2020-11-24 PROCEDURE — 25000003 PHARM REV CODE 250: Performed by: STUDENT IN AN ORGANIZED HEALTH CARE EDUCATION/TRAINING PROGRAM

## 2020-11-24 PROCEDURE — A4216 STERILE WATER/SALINE, 10 ML: HCPCS | Performed by: COLON & RECTAL SURGERY

## 2020-11-24 PROCEDURE — 63600175 PHARM REV CODE 636 W HCPCS: Performed by: COLON & RECTAL SURGERY

## 2020-11-24 PROCEDURE — 94761 N-INVAS EAR/PLS OXIMETRY MLT: CPT

## 2020-11-24 PROCEDURE — 25000003 PHARM REV CODE 250: Performed by: SURGERY

## 2020-11-24 PROCEDURE — 20600001 HC STEP DOWN PRIVATE ROOM

## 2020-11-24 PROCEDURE — 63600175 PHARM REV CODE 636 W HCPCS: Performed by: SURGERY

## 2020-11-24 PROCEDURE — 84478 ASSAY OF TRIGLYCERIDES: CPT

## 2020-11-24 PROCEDURE — 84100 ASSAY OF PHOSPHORUS: CPT

## 2020-11-24 PROCEDURE — 25000003 PHARM REV CODE 250: Performed by: COLON & RECTAL SURGERY

## 2020-11-24 PROCEDURE — 80053 COMPREHEN METABOLIC PANEL: CPT

## 2020-11-24 PROCEDURE — A4217 STERILE WATER/SALINE, 500 ML: HCPCS | Performed by: COLON & RECTAL SURGERY

## 2020-11-24 RX ORDER — POTASSIUM CHLORIDE 1.5 G/1.58G
40 POWDER, FOR SOLUTION ORAL ONCE
Status: COMPLETED | OUTPATIENT
Start: 2020-11-24 | End: 2020-11-24

## 2020-11-24 RX ORDER — INSULIN ASPART 100 [IU]/ML
1-10 INJECTION, SOLUTION INTRAVENOUS; SUBCUTANEOUS EVERY 4 HOURS PRN
Status: DISCONTINUED | OUTPATIENT
Start: 2020-11-24 | End: 2020-11-30 | Stop reason: HOSPADM

## 2020-11-24 RX ORDER — PREDNISONE 5 MG/1
5 TABLET ORAL DAILY
Status: COMPLETED | OUTPATIENT
Start: 2020-11-24 | End: 2020-11-28

## 2020-11-24 RX ORDER — DEXTROSE MONOHYDRATE 100 MG/ML
INJECTION, SOLUTION INTRAVENOUS CONTINUOUS PRN
Status: DISCONTINUED | OUTPATIENT
Start: 2020-11-24 | End: 2020-11-30 | Stop reason: HOSPADM

## 2020-11-24 RX ORDER — GLUCAGON 1 MG
1 KIT INJECTION
Status: DISCONTINUED | OUTPATIENT
Start: 2020-11-24 | End: 2020-11-30 | Stop reason: HOSPADM

## 2020-11-24 RX ADMIN — MUPIROCIN: 20 OINTMENT TOPICAL at 09:11

## 2020-11-24 RX ADMIN — Medication 10 ML: at 07:11

## 2020-11-24 RX ADMIN — HYDROMORPHONE HYDROCHLORIDE 0.5 MG: 1 INJECTION, SOLUTION INTRAMUSCULAR; INTRAVENOUS; SUBCUTANEOUS at 09:11

## 2020-11-24 RX ADMIN — AMLODIPINE BESYLATE 5 MG: 5 TABLET ORAL at 09:11

## 2020-11-24 RX ADMIN — GABAPENTIN 600 MG: 300 CAPSULE ORAL at 09:11

## 2020-11-24 RX ADMIN — ASPIRIN 81 MG: 81 TABLET, COATED ORAL at 09:11

## 2020-11-24 RX ADMIN — SODIUM CHLORIDE, SODIUM LACTATE, POTASSIUM CHLORIDE, CALCIUM CHLORIDE, AND DEXTROSE MONOHYDRATE: 600; 310; 30; 20; 5 INJECTION, SOLUTION INTRAVENOUS at 02:11

## 2020-11-24 RX ADMIN — ENOXAPARIN SODIUM 40 MG: 40 INJECTION SUBCUTANEOUS at 04:11

## 2020-11-24 RX ADMIN — SODIUM CHLORIDE, SODIUM LACTATE, POTASSIUM CHLORIDE, CALCIUM CHLORIDE, AND DEXTROSE MONOHYDRATE: 600; 310; 30; 20; 5 INJECTION, SOLUTION INTRAVENOUS at 01:11

## 2020-11-24 RX ADMIN — COLCHICINE 0.6 MG: 0.6 TABLET, FILM COATED ORAL at 09:11

## 2020-11-24 RX ADMIN — Medication 10 ML: at 06:11

## 2020-11-24 RX ADMIN — ROSUVASTATIN CALCIUM 10 MG: 10 TABLET, FILM COATED ORAL at 09:11

## 2020-11-24 RX ADMIN — MAGNESIUM SULFATE HEPTAHYDRATE: 500 INJECTION, SOLUTION INTRAMUSCULAR; INTRAVENOUS at 09:11

## 2020-11-24 RX ADMIN — METHOCARBAMOL 500 MG: 500 TABLET ORAL at 09:11

## 2020-11-24 RX ADMIN — SIMETHICONE CHEW TAB 80 MG 80 MG: 80 TABLET ORAL at 09:11

## 2020-11-24 RX ADMIN — INSULIN ASPART 2 UNITS: 100 INJECTION, SOLUTION INTRAVENOUS; SUBCUTANEOUS at 12:11

## 2020-11-24 RX ADMIN — PREDNISONE 5 MG: 5 TABLET ORAL at 09:11

## 2020-11-24 RX ADMIN — CARVEDILOL 12.5 MG: 12.5 TABLET, FILM COATED ORAL at 09:11

## 2020-11-24 RX ADMIN — TAMSULOSIN HYDROCHLORIDE 0.4 MG: 0.4 CAPSULE ORAL at 09:11

## 2020-11-24 RX ADMIN — FAMOTIDINE 40 MG: 10 INJECTION INTRAVENOUS at 09:11

## 2020-11-24 RX ADMIN — Medication 10 ML: at 01:11

## 2020-11-24 RX ADMIN — HYDROMORPHONE HYDROCHLORIDE 0.5 MG: 1 INJECTION, SOLUTION INTRAMUSCULAR; INTRAVENOUS; SUBCUTANEOUS at 07:11

## 2020-11-24 RX ADMIN — Medication 10 ML: at 12:11

## 2020-11-24 RX ADMIN — INSULIN ASPART 2 UNITS: 100 INJECTION, SOLUTION INTRAVENOUS; SUBCUTANEOUS at 06:11

## 2020-11-24 RX ADMIN — HYDROMORPHONE HYDROCHLORIDE 0.5 MG: 1 INJECTION, SOLUTION INTRAMUSCULAR; INTRAVENOUS; SUBCUTANEOUS at 01:11

## 2020-11-24 RX ADMIN — AMITRIPTYLINE HYDROCHLORIDE 50 MG: 50 TABLET, FILM COATED ORAL at 09:11

## 2020-11-24 RX ADMIN — POTASSIUM CHLORIDE 40 MEQ: 1.5 FOR SOLUTION ORAL at 12:11

## 2020-11-24 RX ADMIN — HYDROMORPHONE HYDROCHLORIDE 0.5 MG: 1 INJECTION, SOLUTION INTRAMUSCULAR; INTRAVENOUS; SUBCUTANEOUS at 02:11

## 2020-11-24 NOTE — ASSESSMENT & PLAN NOTE
70 y.o. M with hx of UC s/p total proctocolectomy and now s/p ex lap, lysis of adhesions, parastomal hernia repair with mesh (11/09) who presented to ED 11/13 with abdominal pain and decreased ostomy output. Admitted for observation and evaluation.    - patient with 850 mL of bilious NG output over last 24hrs; decreasing   - continue NG tube at [facility]  - continues to endorse baseline nausea  - ostomy continues to function at a low rate, slightly increased from yesterday  - NPO with ice chips; TPN, mIVF  - PICC placed 11/23, TPN initiated  - IR drain placement yesterday; cultures pending   - Gout flare: started colchicine, low dose prednisone x5 days  - Continue home meds   - DVT: KAREN hose, lovenox        Dispo: continue floor care

## 2020-11-24 NOTE — PROGRESS NOTES
[facility]  General Surgery  Progress Note    Subjective:     History of Present Illness:  Jarrett Charlton Jr. is a 70 y.o. M with hx of UC s/p total proctocolectomy and now 6 days post op from ex lap, lysis of adhesions, parastomal hernia repair with mesh (sugarmaameker) who presents to the ED with bloating and decreased ileostomy output.  He had presented 2 nights ago for the same.  He states that he is afraid to eat.  Having pain around his ileostomy site.  In the ED, labs grossly unremarkable.  CT scan shows no signs of obstruction, fluid collection around mesh unchanged from previous scan.  CRS was consulted for evaluation     Post-Op Info:  * No surgery found *         Interval History: Received IR drain of fluid collection yesterday, tolerated well. Stoma output similar to days prior, continuing TPN. NG output decreasing. Complaining of left foot gout flare - acutely tender to touch.     Medications:  Continuous Infusions:   dextrose 5% lactated ringers 75 mL/hr at 11/24/20 0128    TPN ADULT CENTRAL LINE CUSTOM 42 mL/hr at 11/23/20 2250     Scheduled Meds:   amitriptyline  50 mg Oral QHS    amLODIPine  5 mg Oral Daily    aspirin  81 mg Oral Daily    carvediloL  12.5 mg Oral BID    colchicine  0.6 mg Oral Daily    enoxaparin  40 mg Subcutaneous Q24H    famotidine (PF)  40 mg Intravenous Daily    fat emulsion 20%  250 mL Intravenous Daily    gabapentin  600 mg Oral BID    mupirocin   Nasal BID    predniSONE  5 mg Oral Daily    rosuvastatin  10 mg Oral QHS    simethicone  1 tablet Oral TID PC    sodium chloride 0.9%  10 mL Intravenous Q6H    tamsulosin  0.4 mg Oral Daily     PRN Meds:acetaminophen, HYDROmorphone, melatonin, methocarbamoL, ondansetron, oxyCODONE, sodium chloride 0.9%, Flushing PICC Protocol **AND** sodium chloride 0.9% **AND** sodium chloride 0.9%     Review of patient's allergies indicates:  No Known Allergies  Objective:     Vital Signs (Most Recent):  Temp:  97.8 °F (36.6 °C) (11/24/20 0814)  Pulse: 83 (11/24/20 0814)  Resp: 20 (11/24/20 0814)  BP: (!) 138/95 (11/24/20 0814)  SpO2: (!) 93 % (11/24/20 0814) Vital Signs (24h Range):  Temp:  [97.1 °F (36.2 °C)-99.6 °F (37.6 °C)] 97.8 °F (36.6 °C)  Pulse:  [79-96] 83  Resp:  [17-20] 20  SpO2:  [93 %-99 %] 93 %  BP: (104-158)/(64-95) 138/95     Weight: 95.3 kg (210 lb 1.6 oz)  Body mass index is 26.26 kg/m².    Intake/Output - Last 3 Shifts       11/22 0700 - 11/23 0659 11/23 0700 - 11/24 0659 11/24 0700 - 11/25 0659    P.O. 50 75     I.V. (mL/kg) 1815 (19)  1935 (20.3)    NG/GT       .9  512.9    Total Intake(mL/kg) 2332.9 (24.5) 75 (0.8) 2447.9 (25.7)    Urine (mL/kg/hr) 1100 (0.5) 950 (0.4) 375 (1.8)    Drains 1300 980 600    Stool 425 325     Total Output 2825 2255 975    Net -492.1 -2180 +1472.9           Urine Occurrence   1 x    Stool Occurrence 1 x 2 x           Physical Exam  Constitutional:       Appearance: Normal appearance.   HENT:      Head: Normocephalic and atraumatic.   Abdominal:      General: Abdomen is flat.      Palpations: Abdomen is soft.      Tenderness: There is abdominal tenderness.      Comments: Left abdominal TABBY bulb drain with ss output    RLQ ostomy with liquid and soft stool within appliance    Musculoskeletal:      Comments: No swelling, erythema to feet bilaterally   Neurological:      General: No focal deficit present.      Mental Status: He is alert and oriented to person, place, and time.   Psychiatric:         Mood and Affect: Mood normal.         Behavior: Behavior normal.         Significant Labs:    CMP:   Recent Labs   Lab 11/24/20  0400   *   CALCIUM 8.0*   ALBUMIN 2.1*   PROT 5.5*      K 3.2*   CO2 32*   CL 99   BUN 13   CREATININE 1.1   ALKPHOS 108   ALT 15   AST 16   BILITOT 0.6       Significant Diagnostics:  I have reviewed all pertinent imaging results/findings within the past 24 hours.    Assessment/Plan:     Status post repair of ventral hernia  70 y.o. M  with hx of UC s/p total proctocolectomy and now s/p ex lap, lysis of adhesions, parastomal hernia repair with mesh (11/09) who presented to ED 11/13 with abdominal pain and decreased ostomy output. Admitted for observation and evaluation.    - patient with 850 mL of bilious NG output over last 24hrs; decreasing   - continue NG tube at [facility]  - continues to endorse baseline nausea  - ostomy continues to function at a low rate, slightly increased from yesterday  - NPO with ice chips; TPN, mIVF  - PICC placed 11/23, TPN initiated  - HX of DM with elevated POCT blood sugars on TPN - will change accuchecks to Q6, SSI  - IR drain placement yesterday; cultures pending   - Gout flare: started colchicine, low dose prednisone x5 days  - Continue home meds   - DVT: KAREN hose, lovenox        Dispo: continue floor care        Fatuma Zapien MD  General Surgery  [facility]

## 2020-11-24 NOTE — TELEPHONE ENCOUNTER
Spoke to pts daughter he will do an ep transfer with scope being that dr mcknight isnt practicing anymore for the time being he will see dr scope

## 2020-11-24 NOTE — SUBJECTIVE & OBJECTIVE
Interval History: Received IR drain of fluid collection yesterday, tolerated well. Stoma output similar to days prior, continuing TPN. NG output decreasing. Complaining of left foot gout flare - acutely tender to touch.     Medications:  Continuous Infusions:   dextrose 5% lactated ringers 75 mL/hr at 11/24/20 0128    TPN ADULT CENTRAL LINE CUSTOM 42 mL/hr at 11/23/20 2250     Scheduled Meds:   amitriptyline  50 mg Oral QHS    amLODIPine  5 mg Oral Daily    aspirin  81 mg Oral Daily    carvediloL  12.5 mg Oral BID    colchicine  0.6 mg Oral Daily    enoxaparin  40 mg Subcutaneous Q24H    famotidine (PF)  40 mg Intravenous Daily    fat emulsion 20%  250 mL Intravenous Daily    gabapentin  600 mg Oral BID    mupirocin   Nasal BID    predniSONE  5 mg Oral Daily    rosuvastatin  10 mg Oral QHS    simethicone  1 tablet Oral TID PC    sodium chloride 0.9%  10 mL Intravenous Q6H    tamsulosin  0.4 mg Oral Daily     PRN Meds:acetaminophen, HYDROmorphone, melatonin, methocarbamoL, ondansetron, oxyCODONE, sodium chloride 0.9%, Flushing PICC Protocol **AND** sodium chloride 0.9% **AND** sodium chloride 0.9%     Review of patient's allergies indicates:  No Known Allergies  Objective:     Vital Signs (Most Recent):  Temp: 97.8 °F (36.6 °C) (11/24/20 0814)  Pulse: 83 (11/24/20 0814)  Resp: 20 (11/24/20 0814)  BP: (!) 138/95 (11/24/20 0814)  SpO2: (!) 93 % (11/24/20 0814) Vital Signs (24h Range):  Temp:  [97.1 °F (36.2 °C)-99.6 °F (37.6 °C)] 97.8 °F (36.6 °C)  Pulse:  [79-96] 83  Resp:  [17-20] 20  SpO2:  [93 %-99 %] 93 %  BP: (104-158)/(64-95) 138/95     Weight: 95.3 kg (210 lb 1.6 oz)  Body mass index is 26.26 kg/m².    Intake/Output - Last 3 Shifts       11/22 0700 - 11/23 0659 11/23 0700 - 11/24 0659 11/24 0700 - 11/25 0659    P.O. 50 75     I.V. (mL/kg) 1815 (19)  1935 (20.3)    NG/GT       .9  512.9    Total Intake(mL/kg) 2332.9 (24.5) 75 (0.8) 2447.9 (25.7)    Urine (mL/kg/hr) 1100 (0.5) 950 (0.4)  375 (1.8)    Drains 1300 980 600    Stool 425 325     Total Output 2825 2255 975    Net -492.1 -2180 +1472.9           Urine Occurrence   1 x    Stool Occurrence 1 x 2 x           Physical Exam  Constitutional:       Appearance: Normal appearance.   HENT:      Head: Normocephalic and atraumatic.   Abdominal:      General: Abdomen is flat.      Palpations: Abdomen is soft.      Tenderness: There is abdominal tenderness.      Comments: Left abdominal TABBY bulb drain with ss output    RLQ ostomy with liquid and soft stool within appliance    Musculoskeletal:      Comments: No swelling, erythema to feet bilaterally   Neurological:      General: No focal deficit present.      Mental Status: He is alert and oriented to person, place, and time.   Psychiatric:         Mood and Affect: Mood normal.         Behavior: Behavior normal.         Significant Labs:    CMP:   Recent Labs   Lab 11/24/20  0400   *   CALCIUM 8.0*   ALBUMIN 2.1*   PROT 5.5*      K 3.2*   CO2 32*   CL 99   BUN 13   CREATININE 1.1   ALKPHOS 108   ALT 15   AST 16   BILITOT 0.6       Significant Diagnostics:  I have reviewed all pertinent imaging results/findings within the past 24 hours.

## 2020-11-24 NOTE — PLAN OF CARE
"Patient is not medically ready for discharge.     "Stoma with more solid stool output.  Appears to be improving slowly.    Will trend CRP  Drain with negative gram stain.   If cultures negative tomorrow, will pull TABBY drain."       11/24/20 3834   Discharge Reassessment   Assessment Type Discharge Planning Reassessment   Discharge Plan A Home   Discharge Plan B Other  (TBD; patient refuses home health)   DME Needed Upon Discharge  other (see comments)  (TBD)   Anticipated Discharge Disposition Home   Can the patient/caregiver answer the patient profile reliably? Yes, cognitively intact   Eva Bland RN, CM   Ext: [phone]       "

## 2020-11-24 NOTE — TELEPHONE ENCOUNTER
----- Message from Catarino Mota MD sent at 11/24/2020  9:45 AM CST -----  Yes, he can wait until he gets discharged from hospital.  ----- Message -----  From: Nilam Payne LPN  Sent: 11/24/2020   9:21 AM CST  To: Catarino Mota MD    Pt was due today with psa and trelstar. He is currently in hospital. Psa was 0.18, can he wait a few weeks to get the trelstar?  ----- Message -----  From: Doreen Neal  Sent: 11/24/2020   9:01 AM CST  To: Nakul STRONG Staff Good pt daughter#[phone]    She's calling to see if results available. Pt had to cancel appt because he's in the hospital

## 2020-11-24 NOTE — TELEPHONE ENCOUNTER
----- Message from Cedric Pillai sent at 11/24/2020  9:23 AM CST -----  Regarding: APPOINTMENT  Contact: Daughter - Karin  Daughter -  Karin request an appointment for the pt on 12/15/2020 if possible please Daughter ask for a call      Contact info  [phone]

## 2020-11-24 NOTE — PLAN OF CARE
POC reviewed with patient. AAOX4. VSS. ML ABD incision C/D/I. RUQ ileostomy with 200ml output, bag change completed per nurse. Voided per urinal, la urine. Diet, NPO with ice chips. Pain level at a 8, prn dilaudid, oxy, robaxin for generalized abdominal pain.  NG to RLuna FERRIS moderate green output during shift. Off unit to IR from 8468-3281. IR drain to LLQ C/D/I. Ambulated with stand by assist. SCD's in place, Call light in reach with frequent monitoring.

## 2020-11-24 NOTE — PLAN OF CARE
POC reviewed w/ pt, verbalized understanding. Pt AAOx4. VSS on RA. Pain managed w/ PRN pain meds. Pt up to bedside, assist x1 w/ adequate UOP overnight. BM via ostomy.  LLQ IR drain, sanguinous drng. NG tube, R nare on LIWS. Pt NPO sips w/ meds. Pt denies any N/V overnight. Pt slept b/w care. Frequent rounds made for pt safety. Call light in reach. Bed in lowest position and locked. WCTM.

## 2020-11-24 NOTE — PROGRESS NOTES
Wound care follow--up  The patient is lying in bed trying to rest.  The pouch is intact, no leaking.  Ostomy care signing off, please reconsult if needed.

## 2020-11-25 LAB
ALBUMIN SERPL BCP-MCNC: 2 G/DL (ref 3.5–5.2)
ALP SERPL-CCNC: 108 U/L (ref 55–135)
ALT SERPL W/O P-5'-P-CCNC: 15 U/L (ref 10–44)
ANION GAP SERPL CALC-SCNC: 9 MMOL/L (ref 8–16)
AST SERPL-CCNC: 16 U/L (ref 10–40)
BASOPHILS # BLD AUTO: 0.01 K/UL (ref 0–0.2)
BASOPHILS NFR BLD: 0.2 % (ref 0–1.9)
BILIRUB SERPL-MCNC: 0.6 MG/DL (ref 0.1–1)
BUN SERPL-MCNC: 16 MG/DL (ref 8–23)
CALCIUM SERPL-MCNC: 8 MG/DL (ref 8.7–10.5)
CHLORIDE SERPL-SCNC: 102 MMOL/L (ref 95–110)
CO2 SERPL-SCNC: 31 MMOL/L (ref 23–29)
CREAT SERPL-MCNC: 1 MG/DL (ref 0.5–1.4)
CRP SERPL-MCNC: 92.6 MG/L (ref 0–8.2)
DIFFERENTIAL METHOD: ABNORMAL
EOSINOPHIL # BLD AUTO: 0.1 K/UL (ref 0–0.5)
EOSINOPHIL NFR BLD: 1.7 % (ref 0–8)
ERYTHROCYTE [DISTWIDTH] IN BLOOD BY AUTOMATED COUNT: 13.9 % (ref 11.5–14.5)
EST. GFR  (AFRICAN AMERICAN): >60 ML/MIN/1.73 M^2
EST. GFR  (NON AFRICAN AMERICAN): >60 ML/MIN/1.73 M^2
GLUCOSE SERPL-MCNC: 162 MG/DL (ref 70–110)
HCT VFR BLD AUTO: 29.7 % (ref 40–54)
HGB BLD-MCNC: 9.2 G/DL (ref 14–18)
IMM GRANULOCYTES # BLD AUTO: 0.02 K/UL (ref 0–0.04)
IMM GRANULOCYTES NFR BLD AUTO: 0.4 % (ref 0–0.5)
LYMPHOCYTES # BLD AUTO: 0.9 K/UL (ref 1–4.8)
LYMPHOCYTES NFR BLD: 18.6 % (ref 18–48)
MCH RBC QN AUTO: 28.1 PG (ref 27–31)
MCHC RBC AUTO-ENTMCNC: 31 G/DL (ref 32–36)
MCV RBC AUTO: 91 FL (ref 82–98)
MONOCYTES # BLD AUTO: 0.5 K/UL (ref 0.3–1)
MONOCYTES NFR BLD: 9.8 % (ref 4–15)
NEUTROPHILS # BLD AUTO: 3.3 K/UL (ref 1.8–7.7)
NEUTROPHILS NFR BLD: 69.3 % (ref 38–73)
NRBC BLD-RTO: 0 /100 WBC
PHOSPHATE SERPL-MCNC: 3.4 MG/DL (ref 2.7–4.5)
PLATELET # BLD AUTO: 220 K/UL (ref 150–350)
PMV BLD AUTO: 10.8 FL (ref 9.2–12.9)
POCT GLUCOSE: 157 MG/DL (ref 70–110)
POCT GLUCOSE: 160 MG/DL (ref 70–110)
POCT GLUCOSE: 178 MG/DL (ref 70–110)
POCT GLUCOSE: 185 MG/DL (ref 70–110)
POCT GLUCOSE: 190 MG/DL (ref 70–110)
POTASSIUM SERPL-SCNC: 3.3 MMOL/L (ref 3.5–5.1)
PROT SERPL-MCNC: 5.4 G/DL (ref 6–8.4)
RBC # BLD AUTO: 3.27 M/UL (ref 4.6–6.2)
SODIUM SERPL-SCNC: 142 MMOL/L (ref 136–145)
WBC # BLD AUTO: 4.79 K/UL (ref 3.9–12.7)

## 2020-11-25 PROCEDURE — 25000003 PHARM REV CODE 250: Performed by: COLON & RECTAL SURGERY

## 2020-11-25 PROCEDURE — A4217 STERILE WATER/SALINE, 500 ML: HCPCS | Performed by: SURGERY

## 2020-11-25 PROCEDURE — 25000003 PHARM REV CODE 250: Performed by: STUDENT IN AN ORGANIZED HEALTH CARE EDUCATION/TRAINING PROGRAM

## 2020-11-25 PROCEDURE — A4216 STERILE WATER/SALINE, 10 ML: HCPCS | Performed by: COLON & RECTAL SURGERY

## 2020-11-25 PROCEDURE — 25000003 PHARM REV CODE 250: Performed by: SURGERY

## 2020-11-25 PROCEDURE — 85025 COMPLETE CBC W/AUTO DIFF WBC: CPT

## 2020-11-25 PROCEDURE — 63600175 PHARM REV CODE 636 W HCPCS: Performed by: SURGERY

## 2020-11-25 PROCEDURE — 86140 C-REACTIVE PROTEIN: CPT

## 2020-11-25 PROCEDURE — 80053 COMPREHEN METABOLIC PANEL: CPT

## 2020-11-25 PROCEDURE — 84100 ASSAY OF PHOSPHORUS: CPT

## 2020-11-25 PROCEDURE — 63600175 PHARM REV CODE 636 W HCPCS: Performed by: STUDENT IN AN ORGANIZED HEALTH CARE EDUCATION/TRAINING PROGRAM

## 2020-11-25 PROCEDURE — 20600001 HC STEP DOWN PRIVATE ROOM

## 2020-11-25 RX ORDER — POTASSIUM CHLORIDE 7.45 MG/ML
10 INJECTION INTRAVENOUS
Status: COMPLETED | OUTPATIENT
Start: 2020-11-25 | End: 2020-11-26

## 2020-11-25 RX ORDER — POTASSIUM CHLORIDE 1.5 G/1.58G
60 POWDER, FOR SOLUTION ORAL ONCE
Status: DISCONTINUED | OUTPATIENT
Start: 2020-11-25 | End: 2020-11-30 | Stop reason: HOSPADM

## 2020-11-25 RX ADMIN — ROSUVASTATIN CALCIUM 10 MG: 10 TABLET, FILM COATED ORAL at 09:11

## 2020-11-25 RX ADMIN — MUPIROCIN: 20 OINTMENT TOPICAL at 09:11

## 2020-11-25 RX ADMIN — SIMETHICONE CHEW TAB 80 MG 80 MG: 80 TABLET ORAL at 02:11

## 2020-11-25 RX ADMIN — FAMOTIDINE 40 MG: 10 INJECTION INTRAVENOUS at 09:11

## 2020-11-25 RX ADMIN — PREDNISONE 5 MG: 5 TABLET ORAL at 09:11

## 2020-11-25 RX ADMIN — ENOXAPARIN SODIUM 40 MG: 40 INJECTION SUBCUTANEOUS at 04:11

## 2020-11-25 RX ADMIN — Medication 10 ML: at 02:11

## 2020-11-25 RX ADMIN — ALLOPURINOL 450 MG: 300 TABLET ORAL at 09:11

## 2020-11-25 RX ADMIN — INSULIN ASPART 1 UNITS: 100 INJECTION, SOLUTION INTRAVENOUS; SUBCUTANEOUS at 11:11

## 2020-11-25 RX ADMIN — AMLODIPINE BESYLATE 5 MG: 5 TABLET ORAL at 09:11

## 2020-11-25 RX ADMIN — Medication 10 ML: at 12:11

## 2020-11-25 RX ADMIN — GABAPENTIN 600 MG: 300 CAPSULE ORAL at 09:11

## 2020-11-25 RX ADMIN — SIMETHICONE CHEW TAB 80 MG 80 MG: 80 TABLET ORAL at 09:11

## 2020-11-25 RX ADMIN — INSULIN ASPART 2 UNITS: 100 INJECTION, SOLUTION INTRAVENOUS; SUBCUTANEOUS at 07:11

## 2020-11-25 RX ADMIN — Medication 10 ML: at 06:11

## 2020-11-25 RX ADMIN — POTASSIUM CHLORIDE 10 MEQ: 7.46 INJECTION, SOLUTION INTRAVENOUS at 07:11

## 2020-11-25 RX ADMIN — METHOCARBAMOL 500 MG: 500 TABLET ORAL at 09:11

## 2020-11-25 RX ADMIN — CARVEDILOL 12.5 MG: 12.5 TABLET, FILM COATED ORAL at 09:11

## 2020-11-25 RX ADMIN — SIMETHICONE CHEW TAB 80 MG 80 MG: 80 TABLET ORAL at 07:11

## 2020-11-25 RX ADMIN — POTASSIUM CHLORIDE 10 MEQ: 7.46 INJECTION, SOLUTION INTRAVENOUS at 09:11

## 2020-11-25 RX ADMIN — POTASSIUM CHLORIDE 10 MEQ: 7.46 INJECTION, SOLUTION INTRAVENOUS at 10:11

## 2020-11-25 RX ADMIN — HYDROMORPHONE HYDROCHLORIDE 0.5 MG: 1 INJECTION, SOLUTION INTRAMUSCULAR; INTRAVENOUS; SUBCUTANEOUS at 02:11

## 2020-11-25 RX ADMIN — AMITRIPTYLINE HYDROCHLORIDE 50 MG: 50 TABLET, FILM COATED ORAL at 09:11

## 2020-11-25 RX ADMIN — COLCHICINE 0.6 MG: 0.6 TABLET, FILM COATED ORAL at 09:11

## 2020-11-25 RX ADMIN — INSULIN ASPART 1 UNITS: 100 INJECTION, SOLUTION INTRAVENOUS; SUBCUTANEOUS at 02:11

## 2020-11-25 RX ADMIN — ASPIRIN 81 MG: 81 TABLET, COATED ORAL at 09:11

## 2020-11-25 RX ADMIN — Medication 10 ML: at 11:11

## 2020-11-25 RX ADMIN — INSULIN ASPART 2 UNITS: 100 INJECTION, SOLUTION INTRAVENOUS; SUBCUTANEOUS at 12:11

## 2020-11-25 RX ADMIN — OXYCODONE HYDROCHLORIDE 5 MG: 5 TABLET ORAL at 12:11

## 2020-11-25 RX ADMIN — MAGNESIUM SULFATE HEPTAHYDRATE: 500 INJECTION, SOLUTION INTRAMUSCULAR; INTRAVENOUS at 09:11

## 2020-11-25 RX ADMIN — TAMSULOSIN HYDROCHLORIDE 0.4 MG: 0.4 CAPSULE ORAL at 09:11

## 2020-11-25 RX ADMIN — HYDROMORPHONE HYDROCHLORIDE 0.5 MG: 1 INJECTION, SOLUTION INTRAMUSCULAR; INTRAVENOUS; SUBCUTANEOUS at 07:11

## 2020-11-25 RX ADMIN — POTASSIUM CHLORIDE 10 MEQ: 7.46 INJECTION, SOLUTION INTRAVENOUS at 11:11

## 2020-11-25 NOTE — PLAN OF CARE
POC reviewed with patient. AAOX4. VSS. ML ABD incision C/D/I. RUQ ileostomy with  increased output. Voided per urinal, la urine. Diet, NPO, sips with meds. Pain managed with prn dilaudid, robaxin. CARLEE to KALEN FERRIS moderate green output during shift. IR drain to LLQ C/D/I. Ambulated with stand by assist. SCD's in place, Call light in reach with frequent monitoring.

## 2020-11-25 NOTE — PROGRESS NOTES
[facility]  General Surgery  Progress Note    Subjective:     History of Present Illness:  Jarrett Charlton Jr. is a 70 y.o. M with hx of UC s/p total proctocolectomy and now 6 days post op from ex lap, lysis of adhesions, parastomal hernia repair with mesh (sugarbaker) who presents to the ED with bloating and decreased ileostomy output.  He had presented 2 nights ago for the same.  He states that he is afraid to eat.  Having pain around his ileostomy site.  In the ED, labs grossly unremarkable.  CT scan shows no signs of obstruction, fluid collection around mesh unchanged from previous scan.  CRS was consulted for evaluation     Post-Op Info:  * No surgery found *         Interval History: NAEON. Increasing ileostomy output, NG output remained similar. TPN infusing. Decreased pain in left foot, but still cannot put weight on the limb.     Medications:  Continuous Infusions:   dextrose 10 % in water (D10W)      dextrose 5% lactated ringers 75 mL/hr at 11/24/20 1450    TPN ADULT CENTRAL LINE CUSTOM 42 mL/hr at 11/24/20 2127     Scheduled Meds:   allopurinoL  450 mg Oral Daily    amitriptyline  50 mg Oral QHS    amLODIPine  5 mg Oral Daily    aspirin  81 mg Oral Daily    carvediloL  12.5 mg Oral BID    colchicine  0.6 mg Oral Daily    enoxaparin  40 mg Subcutaneous Q24H    famotidine (PF)  40 mg Intravenous Daily    gabapentin  600 mg Oral BID    mupirocin   Nasal BID    predniSONE  5 mg Oral Daily    rosuvastatin  10 mg Oral QHS    simethicone  1 tablet Oral TID PC    sodium chloride 0.9%  10 mL Intravenous Q6H    tamsulosin  0.4 mg Oral Daily     PRN Meds:acetaminophen, dextrose 10 % in water (D10W), dextrose 50%, glucagon (human recombinant), HYDROmorphone, insulin aspart U-100, melatonin, methocarbamoL, ondansetron, oxyCODONE, sodium chloride 0.9%, Flushing PICC Protocol **AND** sodium chloride 0.9% **AND** sodium chloride 0.9%     Review of patient's allergies indicates:  No  Known Allergies  Objective:     Vital Signs (Most Recent):  Temp: 97.6 °F (36.4 °C) (11/25/20 0427)  Pulse: 87 (11/25/20 0427)  Resp: 20 (11/25/20 0427)  BP: (!) 140/77 (11/25/20 0427)  SpO2: (!) 94 % (11/25/20 0427) Vital Signs (24h Range):  Temp:  [97.6 °F (36.4 °C)-98.2 °F (36.8 °C)] 97.6 °F (36.4 °C)  Pulse:  [77-87] 87  Resp:  [16-20] 20  SpO2:  [93 %-96 %] 94 %  BP: (117-140)/(70-95) 140/77     Weight: 95.3 kg (210 lb 1.6 oz)  Body mass index is 26.26 kg/m².    Intake/Output - Last 3 Shifts       11/23 0700 - 11/24 0659 11/24 0700 - 11/25 0659 11/25 0700 - 11/26 0659    P.O. 75 25     I.V. (mL/kg)  2610 (27.4)     NG/GT  30     TPN  890.9     Total Intake(mL/kg) 75 (0.8) 3555.9 (37.3)     Urine (mL/kg/hr) 950 (0.4) 850 (0.4)     Drains 980 1420     Stool 325 625     Total Output 2255 2895     Net -2180 +660.9            Urine Occurrence  1 x     Stool Occurrence 2 x 2 x           Physical Exam  Vitals signs and nursing note reviewed.   Constitutional:       Appearance: Normal appearance.   HENT:      Head: Normocephalic and atraumatic.      Nose:      Comments: NGT to LIWS, dark green/brown output   Cardiovascular:      Rate and Rhythm: Normal rate and regular rhythm.   Pulmonary:      Effort: Pulmonary effort is normal. No respiratory distress.   Abdominal:      General: Abdomen is flat.      Palpations: Abdomen is soft.      Comments: Midline surgical incision healing well  Right sided ileostomy with soft stool in appliance  TABBY bulb drain exiting left of midline, ss output    Musculoskeletal:      Comments: Some erythema to left lateral malleolus, tender to palpation, no swelling   Skin:     General: Skin is warm and dry.   Neurological:      General: No focal deficit present.      Mental Status: He is alert and oriented to person, place, and time.   Psychiatric:         Mood and Affect: Mood normal.         Behavior: Behavior normal.         Significant Labs:  CBC:   Recent Labs   Lab 11/25/20  0450   WBC  4.79   RBC 3.27*   HGB 9.2*   HCT 29.7*      MCV 91   MCH 28.1   MCHC 31.0*     CMP:   Recent Labs   Lab 11/25/20  0450   *   CALCIUM 8.0*   ALBUMIN 2.0*   PROT 5.4*      K 3.3*   CO2 31*      BUN 16   CREATININE 1.0   ALKPHOS 108   ALT 15   AST 16   BILITOT 0.6     CRP: 92.6    Phosphorus: 3.4    Significant Diagnostics:  I have reviewed all pertinent imaging results/findings within the past 24 hours.    Assessment/Plan:     Status post repair of ventral hernia  70 y.o. M with hx of UC s/p total proctocolectomy and now s/p ex lap, lysis of adhesions, parastomal hernia repair with mesh (11/09) who presented to ED 11/13 with abdominal pain and decreased ostomy output. Admitted for observation and evaluation. Now awaiting ileostomy function.     - patient with 800 mL of bilious NG output over last 24hrs; decreasing   - continue NG tube at [facility]  - continues to endorse baseline nausea  - ostomy continues to function, increasing daily  - PICC placed 11/23       - NPO with ice chips; TPN, mIVF  - IR drain placement 11/23; cultures pending   - Gout flare: started colchicine, low dose prednisone x5 days   - improved pain today  - Continue home meds   - DVT: KAREN hose, lovenox        Dispo: continue floor care        Fatuma Zapien MD  General Surgery  [facility]

## 2020-11-25 NOTE — PLAN OF CARE
POC reviewed w/ pt, verbalized understanding. Pt AAOx4. VSS on RA. Pain managed w/ PRN pain meds. Pt up to bathroom, standby assist w/ adequate UOP overnight. BM via ostomy.  LLQ IR drain, sanguinous drng. NG tube, R nare on LIWS. Pt NPO sips w/ meds. Pt denies any N/V overnight. Pt slept b/w care. Frequent rounds made for pt safety. Call light in reach. Bed in lowest position and locked. WCTM.

## 2020-11-25 NOTE — SUBJECTIVE & OBJECTIVE
Interval History: NAEON. Increasing ileostomy output, NG output remained similar. TPN infusing. Decreased pain in left foot, but still cannot put weight on the limb.     Medications:  Continuous Infusions:   dextrose 10 % in water (D10W)      dextrose 5% lactated ringers 75 mL/hr at 11/24/20 1450    TPN ADULT CENTRAL LINE CUSTOM 42 mL/hr at 11/24/20 2127     Scheduled Meds:   allopurinoL  450 mg Oral Daily    amitriptyline  50 mg Oral QHS    amLODIPine  5 mg Oral Daily    aspirin  81 mg Oral Daily    carvediloL  12.5 mg Oral BID    colchicine  0.6 mg Oral Daily    enoxaparin  40 mg Subcutaneous Q24H    famotidine (PF)  40 mg Intravenous Daily    gabapentin  600 mg Oral BID    mupirocin   Nasal BID    predniSONE  5 mg Oral Daily    rosuvastatin  10 mg Oral QHS    simethicone  1 tablet Oral TID PC    sodium chloride 0.9%  10 mL Intravenous Q6H    tamsulosin  0.4 mg Oral Daily     PRN Meds:acetaminophen, dextrose 10 % in water (D10W), dextrose 50%, glucagon (human recombinant), HYDROmorphone, insulin aspart U-100, melatonin, methocarbamoL, ondansetron, oxyCODONE, sodium chloride 0.9%, Flushing PICC Protocol **AND** sodium chloride 0.9% **AND** sodium chloride 0.9%     Review of patient's allergies indicates:  No Known Allergies  Objective:     Vital Signs (Most Recent):  Temp: 97.6 °F (36.4 °C) (11/25/20 0427)  Pulse: 87 (11/25/20 0427)  Resp: 20 (11/25/20 0427)  BP: (!) 140/77 (11/25/20 0427)  SpO2: (!) 94 % (11/25/20 0427) Vital Signs (24h Range):  Temp:  [97.6 °F (36.4 °C)-98.2 °F (36.8 °C)] 97.6 °F (36.4 °C)  Pulse:  [77-87] 87  Resp:  [16-20] 20  SpO2:  [93 %-96 %] 94 %  BP: (117-140)/(70-95) 140/77     Weight: 95.3 kg (210 lb 1.6 oz)  Body mass index is 26.26 kg/m².    Intake/Output - Last 3 Shifts       11/23 0700 - 11/24 0659 11/24 0700 - 11/25 0659 11/25 0700 - 11/26 0659    P.O. 75 25     I.V. (mL/kg)  2610 (27.4)     NG/GT  30     TPN  890.9     Total Intake(mL/kg) 75 (0.8) 3555.9 (37.3)      Urine (mL/kg/hr) 950 (0.4) 850 (0.4)     Drains 980 1420     Stool 325 625     Total Output 2255 2895     Net -2180 +660.9            Urine Occurrence  1 x     Stool Occurrence 2 x 2 x           Physical Exam  Vitals signs and nursing note reviewed.   Constitutional:       Appearance: Normal appearance.   HENT:      Head: Normocephalic and atraumatic.      Nose:      Comments: NGT to LIWS, dark green/brown output   Cardiovascular:      Rate and Rhythm: Normal rate and regular rhythm.   Pulmonary:      Effort: Pulmonary effort is normal. No respiratory distress.   Abdominal:      General: Abdomen is flat.      Palpations: Abdomen is soft.      Comments: Midline surgical incision healing well  Right sided ileostomy with soft stool in appliance  TABBY bulb drain exiting left of midline, ss output    Musculoskeletal:      Comments: Some erythema to left lateral malleolus, tender to palpation, no swelling   Skin:     General: Skin is warm and dry.   Neurological:      General: No focal deficit present.      Mental Status: He is alert and oriented to person, place, and time.   Psychiatric:         Mood and Affect: Mood normal.         Behavior: Behavior normal.         Significant Labs:  CBC:   Recent Labs   Lab 11/25/20  0450   WBC 4.79   RBC 3.27*   HGB 9.2*   HCT 29.7*      MCV 91   MCH 28.1   MCHC 31.0*     CMP:   Recent Labs   Lab 11/25/20  0450   *   CALCIUM 8.0*   ALBUMIN 2.0*   PROT 5.4*      K 3.3*   CO2 31*      BUN 16   CREATININE 1.0   ALKPHOS 108   ALT 15   AST 16   BILITOT 0.6     CRP: 92.6    Phosphorus: 3.4    Significant Diagnostics:  I have reviewed all pertinent imaging results/findings within the past 24 hours.

## 2020-11-25 NOTE — ASSESSMENT & PLAN NOTE
70 y.o. M with hx of UC s/p total proctocolectomy and now s/p ex lap, lysis of adhesions, parastomal hernia repair with mesh (11/09) who presented to ED 11/13 with abdominal pain and decreased ostomy output. Admitted for observation and evaluation. Now awaiting ileostomy function.     - patient with 800 mL of bilious NG output over last 24hrs; decreasing   - continue NG tube at [facility]  - continues to endorse baseline nausea  - ostomy continues to function, increasing daily  - PICC placed 11/23          - NPO with ice chips; TPN, mIVF  - IR drain placement 11/23; cultures pending   - Gout flare: started colchicine, low dose prednisone x5 days   - improved pain today  - Continue home meds   - DVT: KAREN hose, lovenox        Dispo: continue floor care

## 2020-11-26 LAB
ALBUMIN SERPL BCP-MCNC: 2.1 G/DL (ref 3.5–5.2)
ALP SERPL-CCNC: 141 U/L (ref 55–135)
ALT SERPL W/O P-5'-P-CCNC: 18 U/L (ref 10–44)
ANION GAP SERPL CALC-SCNC: 7 MMOL/L (ref 8–16)
AST SERPL-CCNC: 17 U/L (ref 10–40)
BASOPHILS # BLD AUTO: 0.02 K/UL (ref 0–0.2)
BASOPHILS NFR BLD: 0.5 % (ref 0–1.9)
BILIRUB SERPL-MCNC: 0.4 MG/DL (ref 0.1–1)
BUN SERPL-MCNC: 17 MG/DL (ref 8–23)
CALCIUM SERPL-MCNC: 8 MG/DL (ref 8.7–10.5)
CHLORIDE SERPL-SCNC: 103 MMOL/L (ref 95–110)
CO2 SERPL-SCNC: 30 MMOL/L (ref 23–29)
CREAT SERPL-MCNC: 1 MG/DL (ref 0.5–1.4)
CRP SERPL-MCNC: 50.6 MG/L (ref 0–8.2)
DIFFERENTIAL METHOD: ABNORMAL
EOSINOPHIL # BLD AUTO: 0.1 K/UL (ref 0–0.5)
EOSINOPHIL NFR BLD: 2.6 % (ref 0–8)
ERYTHROCYTE [DISTWIDTH] IN BLOOD BY AUTOMATED COUNT: 13.8 % (ref 11.5–14.5)
EST. GFR  (AFRICAN AMERICAN): >60 ML/MIN/1.73 M^2
EST. GFR  (NON AFRICAN AMERICAN): >60 ML/MIN/1.73 M^2
GLUCOSE SERPL-MCNC: 175 MG/DL (ref 70–110)
HCT VFR BLD AUTO: 31.2 % (ref 40–54)
HGB BLD-MCNC: 9.6 G/DL (ref 14–18)
IMM GRANULOCYTES # BLD AUTO: 0.02 K/UL (ref 0–0.04)
IMM GRANULOCYTES NFR BLD AUTO: 0.5 % (ref 0–0.5)
LYMPHOCYTES # BLD AUTO: 0.9 K/UL (ref 1–4.8)
LYMPHOCYTES NFR BLD: 21.9 % (ref 18–48)
MCH RBC QN AUTO: 27.6 PG (ref 27–31)
MCHC RBC AUTO-ENTMCNC: 30.8 G/DL (ref 32–36)
MCV RBC AUTO: 90 FL (ref 82–98)
MONOCYTES # BLD AUTO: 0.4 K/UL (ref 0.3–1)
MONOCYTES NFR BLD: 9.3 % (ref 4–15)
NEUTROPHILS # BLD AUTO: 2.8 K/UL (ref 1.8–7.7)
NEUTROPHILS NFR BLD: 65.2 % (ref 38–73)
NRBC BLD-RTO: 0 /100 WBC
PHOSPHATE SERPL-MCNC: 3.2 MG/DL (ref 2.7–4.5)
PLATELET # BLD AUTO: 234 K/UL (ref 150–350)
PMV BLD AUTO: 10.4 FL (ref 9.2–12.9)
POCT GLUCOSE: 150 MG/DL (ref 70–110)
POCT GLUCOSE: 152 MG/DL (ref 70–110)
POCT GLUCOSE: 198 MG/DL (ref 70–110)
POTASSIUM SERPL-SCNC: 3.9 MMOL/L (ref 3.5–5.1)
PROT SERPL-MCNC: 5.5 G/DL (ref 6–8.4)
RBC # BLD AUTO: 3.48 M/UL (ref 4.6–6.2)
SODIUM SERPL-SCNC: 140 MMOL/L (ref 136–145)
WBC # BLD AUTO: 4.3 K/UL (ref 3.9–12.7)

## 2020-11-26 PROCEDURE — 25000003 PHARM REV CODE 250: Performed by: STUDENT IN AN ORGANIZED HEALTH CARE EDUCATION/TRAINING PROGRAM

## 2020-11-26 PROCEDURE — A4216 STERILE WATER/SALINE, 10 ML: HCPCS | Performed by: COLON & RECTAL SURGERY

## 2020-11-26 PROCEDURE — 63600175 PHARM REV CODE 636 W HCPCS: Performed by: STUDENT IN AN ORGANIZED HEALTH CARE EDUCATION/TRAINING PROGRAM

## 2020-11-26 PROCEDURE — A4217 STERILE WATER/SALINE, 500 ML: HCPCS | Performed by: SURGERY

## 2020-11-26 PROCEDURE — 25000003 PHARM REV CODE 250: Performed by: SURGERY

## 2020-11-26 PROCEDURE — 63600175 PHARM REV CODE 636 W HCPCS: Performed by: SURGERY

## 2020-11-26 PROCEDURE — 85025 COMPLETE CBC W/AUTO DIFF WBC: CPT

## 2020-11-26 PROCEDURE — 25000003 PHARM REV CODE 250: Performed by: COLON & RECTAL SURGERY

## 2020-11-26 PROCEDURE — B4185 PARENTERAL SOL 10 GM LIPIDS: HCPCS | Performed by: SURGERY

## 2020-11-26 PROCEDURE — 80053 COMPREHEN METABOLIC PANEL: CPT

## 2020-11-26 PROCEDURE — 84100 ASSAY OF PHOSPHORUS: CPT

## 2020-11-26 PROCEDURE — 20600001 HC STEP DOWN PRIVATE ROOM

## 2020-11-26 PROCEDURE — 86140 C-REACTIVE PROTEIN: CPT

## 2020-11-26 RX ORDER — FAMOTIDINE 10 MG/ML
20 INJECTION INTRAVENOUS 2 TIMES DAILY
Status: DISCONTINUED | OUTPATIENT
Start: 2020-11-26 | End: 2020-11-27

## 2020-11-26 RX ADMIN — CARVEDILOL 12.5 MG: 12.5 TABLET, FILM COATED ORAL at 09:11

## 2020-11-26 RX ADMIN — I.V. FAT EMULSION 250 ML: 20 EMULSION INTRAVENOUS at 09:11

## 2020-11-26 RX ADMIN — INSULIN ASPART 1 UNITS: 100 INJECTION, SOLUTION INTRAVENOUS; SUBCUTANEOUS at 09:11

## 2020-11-26 RX ADMIN — OXYCODONE HYDROCHLORIDE 5 MG: 5 TABLET ORAL at 11:11

## 2020-11-26 RX ADMIN — SIMETHICONE CHEW TAB 80 MG 80 MG: 80 TABLET ORAL at 09:11

## 2020-11-26 RX ADMIN — GABAPENTIN 600 MG: 300 CAPSULE ORAL at 09:11

## 2020-11-26 RX ADMIN — ENOXAPARIN SODIUM 40 MG: 40 INJECTION SUBCUTANEOUS at 06:11

## 2020-11-26 RX ADMIN — METHOCARBAMOL 500 MG: 500 TABLET ORAL at 09:11

## 2020-11-26 RX ADMIN — POTASSIUM CHLORIDE 10 MEQ: 7.46 INJECTION, SOLUTION INTRAVENOUS at 01:11

## 2020-11-26 RX ADMIN — MUPIROCIN: 20 OINTMENT TOPICAL at 09:11

## 2020-11-26 RX ADMIN — MAGNESIUM SULFATE HEPTAHYDRATE: 500 INJECTION, SOLUTION INTRAMUSCULAR; INTRAVENOUS at 09:11

## 2020-11-26 RX ADMIN — Medication 10 ML: at 06:11

## 2020-11-26 RX ADMIN — AMLODIPINE BESYLATE 5 MG: 5 TABLET ORAL at 09:11

## 2020-11-26 RX ADMIN — HYDROMORPHONE HYDROCHLORIDE 0.5 MG: 1 INJECTION, SOLUTION INTRAMUSCULAR; INTRAVENOUS; SUBCUTANEOUS at 09:11

## 2020-11-26 RX ADMIN — SODIUM CHLORIDE, SODIUM LACTATE, POTASSIUM CHLORIDE, CALCIUM CHLORIDE, AND DEXTROSE MONOHYDRATE: 600; 310; 30; 20; 5 INJECTION, SOLUTION INTRAVENOUS at 04:11

## 2020-11-26 RX ADMIN — COLCHICINE 0.6 MG: 0.6 TABLET, FILM COATED ORAL at 09:11

## 2020-11-26 RX ADMIN — POTASSIUM CHLORIDE 10 MEQ: 7.46 INJECTION, SOLUTION INTRAVENOUS at 02:11

## 2020-11-26 RX ADMIN — Medication 10 ML: at 12:11

## 2020-11-26 RX ADMIN — PREDNISONE 5 MG: 5 TABLET ORAL at 09:11

## 2020-11-26 RX ADMIN — FAMOTIDINE 20 MG: 10 INJECTION INTRAVENOUS at 09:11

## 2020-11-26 RX ADMIN — SODIUM CHLORIDE, SODIUM LACTATE, POTASSIUM CHLORIDE, CALCIUM CHLORIDE, AND DEXTROSE MONOHYDRATE: 600; 310; 30; 20; 5 INJECTION, SOLUTION INTRAVENOUS at 01:11

## 2020-11-26 RX ADMIN — ASPIRIN 81 MG: 81 TABLET, COATED ORAL at 09:11

## 2020-11-26 RX ADMIN — INSULIN ASPART 2 UNITS: 100 INJECTION, SOLUTION INTRAVENOUS; SUBCUTANEOUS at 06:11

## 2020-11-26 RX ADMIN — ALLOPURINOL 450 MG: 300 TABLET ORAL at 09:11

## 2020-11-26 RX ADMIN — AMITRIPTYLINE HYDROCHLORIDE 50 MG: 50 TABLET, FILM COATED ORAL at 09:11

## 2020-11-26 RX ADMIN — ROSUVASTATIN CALCIUM 10 MG: 10 TABLET, FILM COATED ORAL at 09:11

## 2020-11-26 RX ADMIN — TAMSULOSIN HYDROCHLORIDE 0.4 MG: 0.4 CAPSULE ORAL at 09:11

## 2020-11-26 RX ADMIN — POTASSIUM CHLORIDE 10 MEQ: 7.46 INJECTION, SOLUTION INTRAVENOUS at 12:11

## 2020-11-26 RX ADMIN — SIMETHICONE CHEW TAB 80 MG 80 MG: 80 TABLET ORAL at 02:11

## 2020-11-26 NOTE — PLAN OF CARE
Patient alert and oriented.  Able to make needs known.  Comprehends his plan of care.  NGT in place and clamped about 9:00am. Will evaluate in 8 hours. Up and to the bathroom as needed.  Ambulated down the sanchez as well.  Tolerating TPN via PICC line.  Ostomy in place draining clear brown liquid.  Pain meds given as ordered.  Incision, clean and dry.  Will continue to noted.

## 2020-11-26 NOTE — NURSING
Spoke to on call MD and she stated that she was gonna put and an order in  To have the NGT discontinued since the patient didn't have any nausea.  Patient stated that he did feel bloated and wanted it to  Stay in, the on call stated to leave it in.  Will continue to monitor.

## 2020-11-26 NOTE — PROGRESS NOTES
[facility]  General Surgery  Progress Note    Subjective:     History of Present Illness:  Jarrett Charlton Jr. is a 70 y.o. M with hx of UC s/p total proctocolectomy and now 6 days post op from ex lap, lysis of adhesions, parastomal hernia repair with mesh (kaylee) who presents to the ED with bloating and decreased ileostomy output.  He had presented 2 nights ago for the same.  He states that he is afraid to eat.  Having pain around his ileostomy site.  In the ED, labs grossly unremarkable.  CT scan shows no signs of obstruction, fluid collection around mesh unchanged from previous scan.  CRS was consulted for evaluation     Post-Op Info:  * No surgery found *         Interval History: NAEON. NG output high overnight, despite low residual after day time clamp trial yesterday. Ostomy output similar to previous days. Gout flare has resolved. Complaining of acid reflux.     Medications:  Continuous Infusions:   dextrose 10 % in water (D10W)      dextrose 5% lactated ringers 75 mL/hr at 11/26/20 0142    TPN ADULT CENTRAL LINE CUSTOM 42 mL/hr at 11/25/20 2135    TPN ADULT CENTRAL LINE CUSTOM       Scheduled Meds:   allopurinoL  450 mg Oral Daily    amitriptyline  50 mg Oral QHS    amLODIPine  5 mg Oral Daily    aspirin  81 mg Oral Daily    carvediloL  12.5 mg Oral BID    colchicine  0.6 mg Oral Daily    enoxaparin  40 mg Subcutaneous Q24H    famotidine (PF)  20 mg Intravenous BID    gabapentin  600 mg Oral BID    mupirocin   Nasal BID    potassium chloride  60 mEq Oral Once    predniSONE  5 mg Oral Daily    rosuvastatin  10 mg Oral QHS    simethicone  1 tablet Oral TID PC    sodium chloride 0.9%  10 mL Intravenous Q6H    tamsulosin  0.4 mg Oral Daily     PRN Meds:acetaminophen, dextrose 10 % in water (D10W), dextrose 50%, glucagon (human recombinant), HYDROmorphone, insulin aspart U-100, melatonin, methocarbamoL, ondansetron, oxyCODONE, sodium chloride 0.9%, Flushing PICC  Protocol **AND** sodium chloride 0.9% **AND** sodium chloride 0.9%     Review of patient's allergies indicates:  No Known Allergies  Objective:     Vital Signs (Most Recent):  Temp: 98.2 °F (36.8 °C) (11/26/20 0754)  Pulse: 87 (11/26/20 0754)  Resp: 19 (11/26/20 0754)  BP: (!) 141/85 (11/26/20 0754)  SpO2: 96 % (11/26/20 0754) Vital Signs (24h Range):  Temp:  [97.5 °F (36.4 °C)-98.2 °F (36.8 °C)] 98.2 °F (36.8 °C)  Pulse:  [75-90] 87  Resp:  [14-20] 19  SpO2:  [94 %-98 %] 96 %  BP: (127-141)/(72-86) 141/85     Weight: 95.3 kg (210 lb 1.6 oz)  Body mass index is 26.26 kg/m².    Intake/Output - Last 3 Shifts       11/24 0700 - 11/25 0659 11/25 0700 - 11/26 0659 11/26 0700 - 11/27 0659    P.O. 25      I.V. (mL/kg) 2610 (27.4) 2487.5 (26.1)     NG/GT 30      IV Piggyback  800     .9 1277.5     Total Intake(mL/kg) 3555.9 (37.3) 4565 (47.9)     Urine (mL/kg/hr) 850 (0.4) 1150 (0.5)     Drains 1420 1794.5     Stool 625 575     Total Output 2895 3519.5     Net +660.9 +1045.5            Urine Occurrence 1 x      Stool Occurrence 2 x            Physical Exam  Vitals signs and nursing note reviewed.   HENT:      Head: Normocephalic and atraumatic.      Nose:      Comments: NGT to LIWS, dark green output   Cardiovascular:      Rate and Rhythm: Normal rate and regular rhythm.   Pulmonary:      Effort: Pulmonary effort is normal. No respiratory distress.   Abdominal:      Comments: Left sided TABBY bulb drain, ss output  Right sided ileostomy, liquid stool within appliance  Midline abdominal incision c/d/i   Soft, non-distended, appropriately tender to palpation      Musculoskeletal:      Right lower leg: No edema.      Left lower leg: No edema.   Skin:     General: Skin is warm and dry.   Neurological:      General: No focal deficit present.      Mental Status: He is alert and oriented to person, place, and time.   Psychiatric:         Mood and Affect: Mood normal.         Behavior: Behavior normal.         Significant  Labs:  CBC:   Recent Labs   Lab 11/26/20  0408   WBC 4.30   RBC 3.48*   HGB 9.6*   HCT 31.2*      MCV 90   MCH 27.6   MCHC 30.8*     CMP:   Recent Labs   Lab 11/26/20  0403   *   CALCIUM 8.0*   ALBUMIN 2.1*   PROT 5.5*      K 3.9   CO2 30*      BUN 17   CREATININE 1.0   ALKPHOS 141*   ALT 18   AST 17   BILITOT 0.4     CRP: 50.6    Significant Diagnostics:  I have reviewed all pertinent imaging results/findings within the past 24 hours.    Assessment/Plan:     Status post repair of ventral hernia  70 y.o. M with hx of UC s/p total proctocolectomy and now s/p ex lap, lysis of adhesions, parastomal hernia repair with mesh (11/09) who presented to ED 11/13 with abdominal pain and decreased ostomy output. Admitted for observation and evaluation. Now awaiting improved ileostomy function.     - patient with 1.5 L of bilious NG output over last 24hrs;   - continue NG tube at [facility]   - Will Conduct clamp trial today   - continues to endorse baseline nausea; reflux   - ostomy continues to function, unchanged from previous day  - PICC placed 11/23          - NPO with ice chips; TPN, mIVF  - IR drain placement 11/23; cultures pending (NGTD)  - Gout flare: started colchicine, low dose prednisone x5 days   - improved pain today, pt can now support weight on foot  - Continue home meds   - PT/OT following, patient ambulating halls and around room independently   - DVT: KAREN hose, lovenox        Dispo: continue floor care        Fatuma Zapien MD  General Surgery  [facility]

## 2020-11-26 NOTE — PLAN OF CARE
Pt is A&Ox4 injury free. Breathing is regular equal and unlabored bilaterally on room air. NG tube was to LIWS during the night and had dark green output. Pt ambulated to the bathroom by self to empty his ileostomy and to urinate. Pain was controlled with oral and IV medication. The pt received 8 10 meq K riders during the night shift. TPN infused during the night shift.

## 2020-11-26 NOTE — PLAN OF CARE
Patient alert and oriented.  Able to make needs known.  PICC to right arm intact.  Tolerating TPN via PICC.  Pain meds given as ordered per his request. Up to the bathroom.  Right ostomy in place, patent and intact with clear brown output.  NGT clamped this am and residual check completed.  220mL noted for residual.  Comprehends his plan of care.  Will continue to monitor.

## 2020-11-26 NOTE — ASSESSMENT & PLAN NOTE
70 y.o. M with hx of UC s/p total proctocolectomy and now s/p ex lap, lysis of adhesions, parastomal hernia repair with mesh (11/09) who presented to ED 11/13 with abdominal pain and decreased ostomy output. Admitted for observation and evaluation. Now awaiting improved ileostomy function.     - patient with 1.5 L of bilious NG output over last 24hrs;   - continue NG tube at [facility]   - Will Conduct clamp trial today   - continues to endorse baseline nausea; reflux   - ostomy continues to function, unchanged from previous day  - PICC placed 11/23          - NPO with ice chips; TPN, mIVF  - IR drain placement 11/23; cultures pending (NGTD)  - Gout flare: started colchicine, low dose prednisone x5 days   - improved pain today, pt can now support weight on foot  - Continue home meds   - PT/OT following, patient ambulating halls and around room independently   - DVT: KAREN hose, lovenox        Dispo: continue floor care

## 2020-11-26 NOTE — SUBJECTIVE & OBJECTIVE
Interval History: NAEON. NG output high overnight, despite low residual after day time clamp trial yesterday. Ostomy output similar to previous days. Gout flare has resolved. Complaining of acid reflux.     Medications:  Continuous Infusions:   dextrose 10 % in water (D10W)      dextrose 5% lactated ringers 75 mL/hr at 11/26/20 0142    TPN ADULT CENTRAL LINE CUSTOM 42 mL/hr at 11/25/20 2135    TPN ADULT CENTRAL LINE CUSTOM       Scheduled Meds:   allopurinoL  450 mg Oral Daily    amitriptyline  50 mg Oral QHS    amLODIPine  5 mg Oral Daily    aspirin  81 mg Oral Daily    carvediloL  12.5 mg Oral BID    colchicine  0.6 mg Oral Daily    enoxaparin  40 mg Subcutaneous Q24H    famotidine (PF)  20 mg Intravenous BID    gabapentin  600 mg Oral BID    mupirocin   Nasal BID    potassium chloride  60 mEq Oral Once    predniSONE  5 mg Oral Daily    rosuvastatin  10 mg Oral QHS    simethicone  1 tablet Oral TID PC    sodium chloride 0.9%  10 mL Intravenous Q6H    tamsulosin  0.4 mg Oral Daily     PRN Meds:acetaminophen, dextrose 10 % in water (D10W), dextrose 50%, glucagon (human recombinant), HYDROmorphone, insulin aspart U-100, melatonin, methocarbamoL, ondansetron, oxyCODONE, sodium chloride 0.9%, Flushing PICC Protocol **AND** sodium chloride 0.9% **AND** sodium chloride 0.9%     Review of patient's allergies indicates:  No Known Allergies  Objective:     Vital Signs (Most Recent):  Temp: 98.2 °F (36.8 °C) (11/26/20 0754)  Pulse: 87 (11/26/20 0754)  Resp: 19 (11/26/20 0754)  BP: (!) 141/85 (11/26/20 0754)  SpO2: 96 % (11/26/20 0754) Vital Signs (24h Range):  Temp:  [97.5 °F (36.4 °C)-98.2 °F (36.8 °C)] 98.2 °F (36.8 °C)  Pulse:  [75-90] 87  Resp:  [14-20] 19  SpO2:  [94 %-98 %] 96 %  BP: (127-141)/(72-86) 141/85     Weight: 95.3 kg (210 lb 1.6 oz)  Body mass index is 26.26 kg/m².    Intake/Output - Last 3 Shifts       11/24 0700 - 11/25 0659 11/25 0700 - 11/26 0659 11/26 0700 - 11/27 0659    P.O. 25       I.V. (mL/kg) 2610 (27.4) 2487.5 (26.1)     NG/GT 30      IV Piggyback  800     .9 1277.5     Total Intake(mL/kg) 3555.9 (37.3) 4565 (47.9)     Urine (mL/kg/hr) 850 (0.4) 1150 (0.5)     Drains 1420 1794.5     Stool 625 575     Total Output 2895 3519.5     Net +660.9 +1045.5            Urine Occurrence 1 x      Stool Occurrence 2 x            Physical Exam  Vitals signs and nursing note reviewed.   HENT:      Head: Normocephalic and atraumatic.      Nose:      Comments: NGT to LIWS, dark green output   Cardiovascular:      Rate and Rhythm: Normal rate and regular rhythm.   Pulmonary:      Effort: Pulmonary effort is normal. No respiratory distress.   Abdominal:      Comments: Left sided TABBY bulb drain, ss output  Right sided ileostomy, liquid stool within appliance  Midline abdominal incision c/d/i   Soft, non-distended, appropriately tender to palpation      Musculoskeletal:      Right lower leg: No edema.      Left lower leg: No edema.   Skin:     General: Skin is warm and dry.   Neurological:      General: No focal deficit present.      Mental Status: He is alert and oriented to person, place, and time.   Psychiatric:         Mood and Affect: Mood normal.         Behavior: Behavior normal.         Significant Labs:  CBC:   Recent Labs   Lab 11/26/20  0408   WBC 4.30   RBC 3.48*   HGB 9.6*   HCT 31.2*      MCV 90   MCH 27.6   MCHC 30.8*     CMP:   Recent Labs   Lab 11/26/20  0403   *   CALCIUM 8.0*   ALBUMIN 2.1*   PROT 5.5*      K 3.9   CO2 30*      BUN 17   CREATININE 1.0   ALKPHOS 141*   ALT 18   AST 17   BILITOT 0.4     CRP: 50.6    Significant Diagnostics:  I have reviewed all pertinent imaging results/findings within the past 24 hours.

## 2020-11-27 LAB
ALBUMIN SERPL BCP-MCNC: 2.2 G/DL (ref 3.5–5.2)
ALP SERPL-CCNC: 149 U/L (ref 55–135)
ALT SERPL W/O P-5'-P-CCNC: 24 U/L (ref 10–44)
ANION GAP SERPL CALC-SCNC: 7 MMOL/L (ref 8–16)
AST SERPL-CCNC: 25 U/L (ref 10–40)
BACTERIA SPEC AEROBE CULT: NO GROWTH
BASOPHILS # BLD AUTO: 0.02 K/UL (ref 0–0.2)
BASOPHILS NFR BLD: 0.5 % (ref 0–1.9)
BILIRUB SERPL-MCNC: 0.3 MG/DL (ref 0.1–1)
BUN SERPL-MCNC: 15 MG/DL (ref 8–23)
CALCIUM SERPL-MCNC: 8.1 MG/DL (ref 8.7–10.5)
CHLORIDE SERPL-SCNC: 107 MMOL/L (ref 95–110)
CO2 SERPL-SCNC: 28 MMOL/L (ref 23–29)
CREAT SERPL-MCNC: 1 MG/DL (ref 0.5–1.4)
CRP SERPL-MCNC: 19.1 MG/L (ref 0–8.2)
DIFFERENTIAL METHOD: ABNORMAL
EOSINOPHIL # BLD AUTO: 0.2 K/UL (ref 0–0.5)
EOSINOPHIL NFR BLD: 4 % (ref 0–8)
ERYTHROCYTE [DISTWIDTH] IN BLOOD BY AUTOMATED COUNT: 13.8 % (ref 11.5–14.5)
EST. GFR  (AFRICAN AMERICAN): >60 ML/MIN/1.73 M^2
EST. GFR  (NON AFRICAN AMERICAN): >60 ML/MIN/1.73 M^2
GLUCOSE SERPL-MCNC: 152 MG/DL (ref 70–110)
HCT VFR BLD AUTO: 30.9 % (ref 40–54)
HGB BLD-MCNC: 9.4 G/DL (ref 14–18)
IMM GRANULOCYTES # BLD AUTO: 0.03 K/UL (ref 0–0.04)
IMM GRANULOCYTES NFR BLD AUTO: 0.7 % (ref 0–0.5)
LYMPHOCYTES # BLD AUTO: 1 K/UL (ref 1–4.8)
LYMPHOCYTES NFR BLD: 24 % (ref 18–48)
MAGNESIUM SERPL-MCNC: 1.9 MG/DL (ref 1.6–2.6)
MCH RBC QN AUTO: 27.6 PG (ref 27–31)
MCHC RBC AUTO-ENTMCNC: 30.4 G/DL (ref 32–36)
MCV RBC AUTO: 91 FL (ref 82–98)
MONOCYTES # BLD AUTO: 0.4 K/UL (ref 0.3–1)
MONOCYTES NFR BLD: 9.3 % (ref 4–15)
NEUTROPHILS # BLD AUTO: 2.6 K/UL (ref 1.8–7.7)
NEUTROPHILS NFR BLD: 61.5 % (ref 38–73)
NRBC BLD-RTO: 0 /100 WBC
PHOSPHATE SERPL-MCNC: 4.1 MG/DL (ref 2.7–4.5)
PLATELET # BLD AUTO: 247 K/UL (ref 150–350)
PMV BLD AUTO: 10.7 FL (ref 9.2–12.9)
POCT GLUCOSE: 152 MG/DL (ref 70–110)
POCT GLUCOSE: 159 MG/DL (ref 70–110)
POCT GLUCOSE: 188 MG/DL (ref 70–110)
POTASSIUM SERPL-SCNC: 3.6 MMOL/L (ref 3.5–5.1)
PROT SERPL-MCNC: 5.5 G/DL (ref 6–8.4)
RBC # BLD AUTO: 3.41 M/UL (ref 4.6–6.2)
SODIUM SERPL-SCNC: 142 MMOL/L (ref 136–145)
WBC # BLD AUTO: 4.21 K/UL (ref 3.9–12.7)

## 2020-11-27 PROCEDURE — 84100 ASSAY OF PHOSPHORUS: CPT

## 2020-11-27 PROCEDURE — 85025 COMPLETE CBC W/AUTO DIFF WBC: CPT

## 2020-11-27 PROCEDURE — 25000003 PHARM REV CODE 250: Performed by: SURGERY

## 2020-11-27 PROCEDURE — 25000003 PHARM REV CODE 250: Performed by: STUDENT IN AN ORGANIZED HEALTH CARE EDUCATION/TRAINING PROGRAM

## 2020-11-27 PROCEDURE — 63600175 PHARM REV CODE 636 W HCPCS: Performed by: SURGERY

## 2020-11-27 PROCEDURE — 20600001 HC STEP DOWN PRIVATE ROOM

## 2020-11-27 PROCEDURE — 25000003 PHARM REV CODE 250: Performed by: COLON & RECTAL SURGERY

## 2020-11-27 PROCEDURE — A4217 STERILE WATER/SALINE, 500 ML: HCPCS | Performed by: SURGERY

## 2020-11-27 PROCEDURE — A4216 STERILE WATER/SALINE, 10 ML: HCPCS | Performed by: COLON & RECTAL SURGERY

## 2020-11-27 PROCEDURE — 86140 C-REACTIVE PROTEIN: CPT

## 2020-11-27 PROCEDURE — B4185 PARENTERAL SOL 10 GM LIPIDS: HCPCS | Performed by: SURGERY

## 2020-11-27 PROCEDURE — 80053 COMPREHEN METABOLIC PANEL: CPT

## 2020-11-27 PROCEDURE — 83735 ASSAY OF MAGNESIUM: CPT

## 2020-11-27 PROCEDURE — 63600175 PHARM REV CODE 636 W HCPCS: Performed by: STUDENT IN AN ORGANIZED HEALTH CARE EDUCATION/TRAINING PROGRAM

## 2020-11-27 RX ORDER — PANTOPRAZOLE SODIUM 40 MG/1
40 TABLET, DELAYED RELEASE ORAL DAILY
Status: DISCONTINUED | OUTPATIENT
Start: 2020-11-27 | End: 2020-11-30 | Stop reason: HOSPADM

## 2020-11-27 RX ORDER — METOCLOPRAMIDE 5 MG/1
10 TABLET ORAL 3 TIMES DAILY
Status: DISCONTINUED | OUTPATIENT
Start: 2020-11-27 | End: 2020-11-30 | Stop reason: HOSPADM

## 2020-11-27 RX ADMIN — I.V. FAT EMULSION 250 ML: 20 EMULSION INTRAVENOUS at 11:11

## 2020-11-27 RX ADMIN — CARVEDILOL 12.5 MG: 12.5 TABLET, FILM COATED ORAL at 08:11

## 2020-11-27 RX ADMIN — HYDROMORPHONE HYDROCHLORIDE 0.5 MG: 1 INJECTION, SOLUTION INTRAMUSCULAR; INTRAVENOUS; SUBCUTANEOUS at 05:11

## 2020-11-27 RX ADMIN — COLCHICINE 0.6 MG: 0.6 TABLET, FILM COATED ORAL at 08:11

## 2020-11-27 RX ADMIN — Medication 10 ML: at 12:11

## 2020-11-27 RX ADMIN — PANTOPRAZOLE SODIUM 40 MG: 40 TABLET, DELAYED RELEASE ORAL at 09:11

## 2020-11-27 RX ADMIN — SODIUM CHLORIDE, SODIUM LACTATE, POTASSIUM CHLORIDE, CALCIUM CHLORIDE, AND DEXTROSE MONOHYDRATE: 600; 310; 30; 20; 5 INJECTION, SOLUTION INTRAVENOUS at 06:11

## 2020-11-27 RX ADMIN — ASPIRIN 81 MG: 81 TABLET, COATED ORAL at 08:11

## 2020-11-27 RX ADMIN — CARVEDILOL 12.5 MG: 12.5 TABLET, FILM COATED ORAL at 09:11

## 2020-11-27 RX ADMIN — AMLODIPINE BESYLATE 5 MG: 5 TABLET ORAL at 08:11

## 2020-11-27 RX ADMIN — INSULIN ASPART 2 UNITS: 100 INJECTION, SOLUTION INTRAVENOUS; SUBCUTANEOUS at 05:11

## 2020-11-27 RX ADMIN — METHOCARBAMOL 500 MG: 500 TABLET ORAL at 09:11

## 2020-11-27 RX ADMIN — OXYCODONE HYDROCHLORIDE 5 MG: 5 TABLET ORAL at 09:11

## 2020-11-27 RX ADMIN — ROSUVASTATIN CALCIUM 10 MG: 10 TABLET, FILM COATED ORAL at 09:11

## 2020-11-27 RX ADMIN — METOCLOPRAMIDE 10 MG: 5 TABLET ORAL at 03:11

## 2020-11-27 RX ADMIN — SIMETHICONE CHEW TAB 80 MG 80 MG: 80 TABLET ORAL at 02:11

## 2020-11-27 RX ADMIN — ALLOPURINOL 450 MG: 300 TABLET ORAL at 08:11

## 2020-11-27 RX ADMIN — GABAPENTIN 600 MG: 300 CAPSULE ORAL at 08:11

## 2020-11-27 RX ADMIN — MELATONIN TAB 3 MG 6 MG: 3 TAB at 09:11

## 2020-11-27 RX ADMIN — MUPIROCIN: 20 OINTMENT TOPICAL at 08:11

## 2020-11-27 RX ADMIN — METOCLOPRAMIDE 10 MG: 5 TABLET ORAL at 09:11

## 2020-11-27 RX ADMIN — Medication 10 ML: at 06:11

## 2020-11-27 RX ADMIN — MAGNESIUM SULFATE HEPTAHYDRATE: 500 INJECTION, SOLUTION INTRAMUSCULAR; INTRAVENOUS at 11:11

## 2020-11-27 RX ADMIN — PREDNISONE 5 MG: 5 TABLET ORAL at 08:11

## 2020-11-27 RX ADMIN — INSULIN ASPART 2 UNITS: 100 INJECTION, SOLUTION INTRAVENOUS; SUBCUTANEOUS at 12:11

## 2020-11-27 RX ADMIN — AMITRIPTYLINE HYDROCHLORIDE 50 MG: 50 TABLET, FILM COATED ORAL at 09:11

## 2020-11-27 RX ADMIN — GABAPENTIN 600 MG: 300 CAPSULE ORAL at 09:11

## 2020-11-27 RX ADMIN — TAMSULOSIN HYDROCHLORIDE 0.4 MG: 0.4 CAPSULE ORAL at 08:11

## 2020-11-27 RX ADMIN — SIMETHICONE CHEW TAB 80 MG 80 MG: 80 TABLET ORAL at 09:11

## 2020-11-27 RX ADMIN — ENOXAPARIN SODIUM 40 MG: 40 INJECTION SUBCUTANEOUS at 05:11

## 2020-11-27 RX ADMIN — MUPIROCIN: 20 OINTMENT TOPICAL at 09:11

## 2020-11-27 RX ADMIN — SIMETHICONE CHEW TAB 80 MG 80 MG: 80 TABLET ORAL at 07:11

## 2020-11-27 RX ADMIN — INSULIN ASPART 1 UNITS: 100 INJECTION, SOLUTION INTRAVENOUS; SUBCUTANEOUS at 09:11

## 2020-11-27 NOTE — PROGRESS NOTES
[facility]  General Surgery  Progress Note    Subjective:     History of Present Illness:  Jarrett Charlton Jr. is a 70 y.o. M with hx of UC s/p total proctocolectomy and now 6 days post op from ex lap, lysis of adhesions, parastomal hernia repair with mesh (kaylee) who presents to the ED with bloating and decreased ileostomy output.  He had presented 2 nights ago for the same.  He states that he is afraid to eat.  Having pain around his ileostomy site.  In the ED, labs grossly unremarkable.  CT scan shows no signs of obstruction, fluid collection around mesh unchanged from previous scan.  CRS was consulted for evaluation     Post-Op Info:  * No surgery found *         Interval History: NAEON. NGT clamped overnight, denies nausea currently. Ostomy output low over previus 24 hours. Persistent reflux which he has historically.     Medications:  Continuous Infusions:   dextrose 10 % in water (D10W)      dextrose 5% lactated ringers 75 mL/hr at 11/27/20 0618    TPN ADULT CENTRAL LINE CUSTOM 42 mL/hr at 11/26/20 2137     Scheduled Meds:   allopurinoL  450 mg Oral Daily    amitriptyline  50 mg Oral QHS    amLODIPine  5 mg Oral Daily    aspirin  81 mg Oral Daily    carvediloL  12.5 mg Oral BID    colchicine  0.6 mg Oral Daily    enoxaparin  40 mg Subcutaneous Q24H    famotidine (PF)  20 mg Intravenous BID    fat emulsion 20%  250 mL Intravenous Daily    gabapentin  600 mg Oral BID    mupirocin   Nasal BID    potassium chloride  60 mEq Oral Once    predniSONE  5 mg Oral Daily    rosuvastatin  10 mg Oral QHS    simethicone  1 tablet Oral TID PC    sodium chloride 0.9%  10 mL Intravenous Q6H    tamsulosin  0.4 mg Oral Daily     PRN Meds:acetaminophen, dextrose 10 % in water (D10W), dextrose 50%, glucagon (human recombinant), HYDROmorphone, insulin aspart U-100, melatonin, methocarbamoL, ondansetron, oxyCODONE, sodium chloride 0.9%, Flushing PICC Protocol **AND** sodium chloride 0.9%  **AND** sodium chloride 0.9%     Review of patient's allergies indicates:  No Known Allergies  Objective:     Vital Signs (Most Recent):  Temp: 97.3 °F (36.3 °C) (11/27/20 0448)  Pulse: 83 (11/27/20 0448)  Resp: 18 (11/27/20 0448)  BP: (!) 149/83 (11/27/20 0448)  SpO2: 96 % (11/27/20 0448) Vital Signs (24h Range):  Temp:  [97.3 °F (36.3 °C)-98.2 °F (36.8 °C)] 97.3 °F (36.3 °C)  Pulse:  [77-94] 83  Resp:  [17-20] 18  SpO2:  [95 %-99 %] 96 %  BP: (130-165)/(82-95) 149/83     Weight: 95.3 kg (210 lb 1.6 oz)  Body mass index is 26.26 kg/m².    Intake/Output - Last 3 Shifts       11/25 0700 - 11/26 0659 11/26 0700 - 11/27 0659 11/27 0700 - 11/28 0659    P.O.       I.V. (mL/kg) 2487.5 (26.1) 400 (4.2)     NG/GT       IV Piggyback 800      TPN 1277.5 13.3     Total Intake(mL/kg) 4565 (47.9) 413.3 (4.3)     Urine (mL/kg/hr) 1150 (0.5) 675 (0.3)     Drains 1794.5 27.5     Stool 575 225     Total Output 3519.5 927.5     Net +1045.5 -514.2                  Physical Exam  Vitals signs and nursing note reviewed.   Constitutional:       Appearance: Normal appearance.   HENT:      Head: Normocephalic and atraumatic.      Nose:      Comments: NGT in place, not connected to suction    Cardiovascular:      Rate and Rhythm: Normal rate and regular rhythm.   Pulmonary:      Effort: Pulmonary effort is normal. No respiratory distress.   Abdominal:      Comments: Abdomen soft, non-distended, non-tender   Midline abdominal incision c/d/i  RLQ ostomy with small amount of liquid stool in appliance   Skin:     General: Skin is warm and dry.   Neurological:      General: No focal deficit present.      Mental Status: He is alert and oriented to person, place, and time.   Psychiatric:         Mood and Affect: Mood normal.         Behavior: Behavior normal.         Significant Labs:  CBC, CMP, CRP pending       Significant Diagnostics:  I have reviewed all pertinent imaging results/findings within the past 24 hours.    Assessment/Plan:      Status post repair of ventral hernia  70 y.o. M with hx of UC s/p total proctocolectomy and now s/p ex lap, lysis of adhesions, parastomal hernia repair with mesh (11/09) who presented to ED 11/13 with abdominal pain and decreased ostomy output. Admitted for observation and evaluation. Now awaiting improved ileostomy function.     - NG clamped overnight without nausea; Will check residual this am and consider pulling NG   - Ostomy continues to function, decreased from previous day  - PICC placed 11/23          - NPO with ice chips; TPN, mIVF  - IR drain placement 11/23; cultures pending (NGTD)  - Gout flare: started colchicine, low dose prednisone x5 days   - improved pain today, pt can now support weight on foot  - Continue home meds   - PT/OT following, patient ambulating halls and around room independently   - DVT: KAREN hose, lovenox        Dispo: continue floor care        Fatuma Zapien MD  General Surgery  [facility]

## 2020-11-27 NOTE — ASSESSMENT & PLAN NOTE
70 y.o. M with hx of UC s/p total proctocolectomy and now s/p ex lap, lysis of adhesions, parastomal hernia repair with mesh (11/09) who presented to ED 11/13 with abdominal pain and decreased ostomy output. Admitted for observation and evaluation. Now awaiting improved ileostomy function.     - NG clamped overnight without nausea; Will check residual this am and consider pulling NG   - Ostomy continues to function, decreased from previous day  - PICC placed 11/23          - NPO with ice chips; TPN, mIVF  - IR drain placement 11/23; cultures pending (NGTD)  - Gout flare: started colchicine, low dose prednisone x5 days   - improved pain today, pt can now support weight on foot  - Continue home meds   - PT/OT following, patient ambulating halls and around room independently   - DVT: KAREN hose, lovenox        Dispo: continue floor care

## 2020-11-28 LAB
ALBUMIN SERPL BCP-MCNC: 2.1 G/DL (ref 3.5–5.2)
ALP SERPL-CCNC: 152 U/L (ref 55–135)
ALT SERPL W/O P-5'-P-CCNC: 28 U/L (ref 10–44)
ANION GAP SERPL CALC-SCNC: 8 MMOL/L (ref 8–16)
AST SERPL-CCNC: 24 U/L (ref 10–40)
BASOPHILS # BLD AUTO: 0.02 K/UL (ref 0–0.2)
BASOPHILS NFR BLD: 0.5 % (ref 0–1.9)
BILIRUB SERPL-MCNC: 0.3 MG/DL (ref 0.1–1)
BUN SERPL-MCNC: 18 MG/DL (ref 8–23)
CALCIUM SERPL-MCNC: 8 MG/DL (ref 8.7–10.5)
CHLORIDE SERPL-SCNC: 105 MMOL/L (ref 95–110)
CO2 SERPL-SCNC: 25 MMOL/L (ref 23–29)
CREAT SERPL-MCNC: 1 MG/DL (ref 0.5–1.4)
CRP SERPL-MCNC: 9.9 MG/L (ref 0–8.2)
DIFFERENTIAL METHOD: ABNORMAL
EOSINOPHIL # BLD AUTO: 0.2 K/UL (ref 0–0.5)
EOSINOPHIL NFR BLD: 3.8 % (ref 0–8)
ERYTHROCYTE [DISTWIDTH] IN BLOOD BY AUTOMATED COUNT: 13.7 % (ref 11.5–14.5)
EST. GFR  (AFRICAN AMERICAN): >60 ML/MIN/1.73 M^2
EST. GFR  (NON AFRICAN AMERICAN): >60 ML/MIN/1.73 M^2
GLUCOSE SERPL-MCNC: 170 MG/DL (ref 70–110)
HCT VFR BLD AUTO: 29.1 % (ref 40–54)
HGB BLD-MCNC: 8.7 G/DL (ref 14–18)
IMM GRANULOCYTES # BLD AUTO: 0.02 K/UL (ref 0–0.04)
IMM GRANULOCYTES NFR BLD AUTO: 0.5 % (ref 0–0.5)
LYMPHOCYTES # BLD AUTO: 1 K/UL (ref 1–4.8)
LYMPHOCYTES NFR BLD: 26 % (ref 18–48)
MAGNESIUM SERPL-MCNC: 2 MG/DL (ref 1.6–2.6)
MCH RBC QN AUTO: 27.2 PG (ref 27–31)
MCHC RBC AUTO-ENTMCNC: 29.9 G/DL (ref 32–36)
MCV RBC AUTO: 91 FL (ref 82–98)
MONOCYTES # BLD AUTO: 0.4 K/UL (ref 0.3–1)
MONOCYTES NFR BLD: 9.3 % (ref 4–15)
NEUTROPHILS # BLD AUTO: 2.4 K/UL (ref 1.8–7.7)
NEUTROPHILS NFR BLD: 59.9 % (ref 38–73)
NRBC BLD-RTO: 0 /100 WBC
PHOSPHATE SERPL-MCNC: 4 MG/DL (ref 2.7–4.5)
PLATELET # BLD AUTO: 227 K/UL (ref 150–350)
PMV BLD AUTO: 10.7 FL (ref 9.2–12.9)
POCT GLUCOSE: 146 MG/DL (ref 70–110)
POCT GLUCOSE: 161 MG/DL (ref 70–110)
POTASSIUM SERPL-SCNC: 3.7 MMOL/L (ref 3.5–5.1)
PROT SERPL-MCNC: 5.1 G/DL (ref 6–8.4)
RBC # BLD AUTO: 3.2 M/UL (ref 4.6–6.2)
SODIUM SERPL-SCNC: 138 MMOL/L (ref 136–145)
WBC # BLD AUTO: 4 K/UL (ref 3.9–12.7)

## 2020-11-28 PROCEDURE — A4216 STERILE WATER/SALINE, 10 ML: HCPCS | Performed by: COLON & RECTAL SURGERY

## 2020-11-28 PROCEDURE — 85025 COMPLETE CBC W/AUTO DIFF WBC: CPT

## 2020-11-28 PROCEDURE — 86140 C-REACTIVE PROTEIN: CPT

## 2020-11-28 PROCEDURE — 25000003 PHARM REV CODE 250: Performed by: STUDENT IN AN ORGANIZED HEALTH CARE EDUCATION/TRAINING PROGRAM

## 2020-11-28 PROCEDURE — 84100 ASSAY OF PHOSPHORUS: CPT

## 2020-11-28 PROCEDURE — 80053 COMPREHEN METABOLIC PANEL: CPT

## 2020-11-28 PROCEDURE — 20600001 HC STEP DOWN PRIVATE ROOM

## 2020-11-28 PROCEDURE — B4185 PARENTERAL SOL 10 GM LIPIDS: HCPCS | Performed by: SURGERY

## 2020-11-28 PROCEDURE — A4217 STERILE WATER/SALINE, 500 ML: HCPCS | Performed by: SURGERY

## 2020-11-28 PROCEDURE — 25000003 PHARM REV CODE 250: Performed by: COLON & RECTAL SURGERY

## 2020-11-28 PROCEDURE — 63600175 PHARM REV CODE 636 W HCPCS: Performed by: SURGERY

## 2020-11-28 PROCEDURE — 83735 ASSAY OF MAGNESIUM: CPT

## 2020-11-28 PROCEDURE — 25000003 PHARM REV CODE 250: Performed by: SURGERY

## 2020-11-28 RX ADMIN — ASPIRIN 81 MG: 81 TABLET, COATED ORAL at 08:11

## 2020-11-28 RX ADMIN — Medication 10 ML: at 12:11

## 2020-11-28 RX ADMIN — SIMETHICONE CHEW TAB 80 MG 80 MG: 80 TABLET ORAL at 09:11

## 2020-11-28 RX ADMIN — INSULIN ASPART 2 UNITS: 100 INJECTION, SOLUTION INTRAVENOUS; SUBCUTANEOUS at 12:11

## 2020-11-28 RX ADMIN — CARVEDILOL 12.5 MG: 12.5 TABLET, FILM COATED ORAL at 08:11

## 2020-11-28 RX ADMIN — GABAPENTIN 600 MG: 300 CAPSULE ORAL at 08:11

## 2020-11-28 RX ADMIN — TAMSULOSIN HYDROCHLORIDE 0.4 MG: 0.4 CAPSULE ORAL at 08:11

## 2020-11-28 RX ADMIN — METOCLOPRAMIDE 10 MG: 5 TABLET ORAL at 10:11

## 2020-11-28 RX ADMIN — PREDNISONE 5 MG: 5 TABLET ORAL at 08:11

## 2020-11-28 RX ADMIN — CARVEDILOL 12.5 MG: 12.5 TABLET, FILM COATED ORAL at 10:11

## 2020-11-28 RX ADMIN — METOCLOPRAMIDE 10 MG: 5 TABLET ORAL at 09:11

## 2020-11-28 RX ADMIN — OXYCODONE HYDROCHLORIDE 5 MG: 5 TABLET ORAL at 12:11

## 2020-11-28 RX ADMIN — ROSUVASTATIN CALCIUM 10 MG: 10 TABLET, FILM COATED ORAL at 10:11

## 2020-11-28 RX ADMIN — AMLODIPINE BESYLATE 5 MG: 5 TABLET ORAL at 08:11

## 2020-11-28 RX ADMIN — COLCHICINE 0.6 MG: 0.6 TABLET, FILM COATED ORAL at 08:11

## 2020-11-28 RX ADMIN — ENOXAPARIN SODIUM 40 MG: 40 INJECTION SUBCUTANEOUS at 05:11

## 2020-11-28 RX ADMIN — MAGNESIUM SULFATE HEPTAHYDRATE: 500 INJECTION, SOLUTION INTRAMUSCULAR; INTRAVENOUS at 10:11

## 2020-11-28 RX ADMIN — Medication 10 ML: at 06:11

## 2020-11-28 RX ADMIN — PANTOPRAZOLE SODIUM 40 MG: 40 TABLET, DELAYED RELEASE ORAL at 09:11

## 2020-11-28 RX ADMIN — GABAPENTIN 600 MG: 300 CAPSULE ORAL at 10:11

## 2020-11-28 RX ADMIN — I.V. FAT EMULSION 250 ML: 20 EMULSION INTRAVENOUS at 10:11

## 2020-11-28 RX ADMIN — METOCLOPRAMIDE 10 MG: 5 TABLET ORAL at 02:11

## 2020-11-28 RX ADMIN — ALLOPURINOL 450 MG: 300 TABLET ORAL at 08:11

## 2020-11-28 RX ADMIN — SIMETHICONE CHEW TAB 80 MG 80 MG: 80 TABLET ORAL at 02:11

## 2020-11-28 RX ADMIN — AMITRIPTYLINE HYDROCHLORIDE 50 MG: 50 TABLET, FILM COATED ORAL at 10:11

## 2020-11-28 RX ADMIN — SIMETHICONE CHEW TAB 80 MG 80 MG: 80 TABLET ORAL at 07:11

## 2020-11-28 NOTE — PLAN OF CARE
Revewed POC with pt; re: pain management; TPN and diabetes monitoring, maintaining infection free; self care of ileostomy. Discussed nutrition.  Pt verbalized understanding and continues with POC.

## 2020-11-28 NOTE — ASSESSMENT & PLAN NOTE
70 y.o. M with hx of UC s/p total proctocolectomy and now s/p ex lap, lysis of adhesions, parastomal hernia repair with mesh (11/09) who presented to ED 11/13 with abdominal pain and decreased ostomy output. Admitted for observation and evaluation. Now awaiting improved ileostomy function.     - NG pulled yesterday without nausea; Pt. Kept NPO overnight.   - Will advance to CLD today   - Ostomy continues to function, greatly increased overnight from previous days   - PICC placed 11/23          - Continue TPN  - IR drain placement 11/23; cultures pending (NGTD)  - Will pull IR drain today  - Gout flare 11/23: started colchicine, low dose prednisone x5 days   - Appears resolved  - Continue home meds   - PT/OT following, patient ambulating halls and around room independently   - DVT: KAREN hose, lovenox        Dispo: continue floor care

## 2020-11-28 NOTE — PROGRESS NOTES
"[facility]  General Surgery  Progress Note    Subjective:     History of Present Illness:  Jarrett Charlton Jr. is a 70 y.o. M with hx of UC s/p total proctocolectomy and now 6 days post op from ex lap, lysis of adhesions, parastomal hernia repair with mesh (kaylee) who presents to the ED with bloating and decreased ileostomy output.  He had presented 2 nights ago for the same.  He states that he is afraid to eat.  Having pain around his ileostomy site.  In the ED, labs grossly unremarkable.  CT scan shows no signs of obstruction, fluid collection around mesh unchanged from previous scan.  CRS was consulted for evaluation     Post-Op Info:  * No surgery found *         Interval History: NAEON. NG pulled yesterday. Patient denies nausea. Increased ostomy output, patient reports it "opened up." He would like to try liquids today. Reflux much improved.     Medications:  Continuous Infusions:   dextrose 10 % in water (D10W)      dextrose 5% lactated ringers 75 mL/hr at 11/28/20 0200    TPN ADULT CENTRAL LINE CUSTOM 42 mL/hr at 11/28/20 0200     Scheduled Meds:   allopurinoL  450 mg Oral Daily    amitriptyline  50 mg Oral QHS    amLODIPine  5 mg Oral Daily    aspirin  81 mg Oral Daily    carvediloL  12.5 mg Oral BID    colchicine  0.6 mg Oral Daily    enoxaparin  40 mg Subcutaneous Q24H    fat emulsion 20%  250 mL Intravenous Daily    gabapentin  600 mg Oral BID    metoclopramide HCl  10 mg Oral TID    pantoprazole  40 mg Oral Daily    potassium chloride  60 mEq Oral Once    predniSONE  5 mg Oral Daily    rosuvastatin  10 mg Oral QHS    simethicone  1 tablet Oral TID PC    sodium chloride 0.9%  10 mL Intravenous Q6H    tamsulosin  0.4 mg Oral Daily     PRN Meds:acetaminophen, dextrose 10 % in water (D10W), dextrose 50%, glucagon (human recombinant), HYDROmorphone, insulin aspart U-100, melatonin, methocarbamoL, ondansetron, oxyCODONE, sodium chloride 0.9%, Flushing PICC " Protocol **AND** sodium chloride 0.9% **AND** sodium chloride 0.9%     Review of patient's allergies indicates:  No Known Allergies  Objective:     Vital Signs (Most Recent):  Temp: 96.1 °F (35.6 °C) (11/28/20 0349)  Pulse: 79 (11/28/20 0349)  Resp: 16 (11/28/20 0349)  BP: (!) 146/81 (11/28/20 0349)  SpO2: 95 % (11/28/20 0349) Vital Signs (24h Range):  Temp:  [96.1 °F (35.6 °C)-97.9 °F (36.6 °C)] 96.1 °F (35.6 °C)  Pulse:  [76-91] 79  Resp:  [16-18] 16  SpO2:  [93 %-98 %] 95 %  BP: ()/(61-86) 146/81     Weight: 95.3 kg (210 lb 1.6 oz)  Body mass index is 26.26 kg/m².    Intake/Output - Last 3 Shifts       11/26 0700 - 11/27 0659 11/27 0700 - 11/28 0659    P.O.  240    I.V. (mL/kg) 400 (4.2) 492.5 (5.2)    TPN 13.3 647.1    Total Intake(mL/kg) 413.3 (4.3) 1379.6 (14.5)    Urine (mL/kg/hr) 675 (0.3) 1900 (0.8)    Drains 27.5 30    Stool 225 1150    Total Output 927.5 3080    Net -514.2 -1700.4          Stool Occurrence  0 x          Physical Exam  Constitutional:       Appearance: Normal appearance.   HENT:      Head: Normocephalic and atraumatic.      Nose: Nose normal.   Cardiovascular:      Rate and Rhythm: Normal rate and regular rhythm.   Pulmonary:      Effort: Pulmonary effort is normal. No respiratory distress.   Abdominal:      General: Abdomen is flat.      Palpations: Abdomen is soft.      Comments: Abdomen non-distended, soft, appropriately tender to palpation  Midline abdominal incision healing well, c/d/i   Left sided TABBY drain twith ss output  Right sided ostomy with gas and liquid stool in appliance    Musculoskeletal:      Right lower leg: No edema.      Left lower leg: No edema.   Skin:     General: Skin is warm and dry.   Neurological:      General: No focal deficit present.      Mental Status: He is alert.   Psychiatric:         Mood and Affect: Mood normal.         Behavior: Behavior normal.         Significant Labs:  CBC:   Recent Labs   Lab 11/28/20  0458   WBC 4.00   RBC 3.20*   HGB 8.7*    HCT 29.1*      MCV 91   MCH 27.2   MCHC 29.9*     CMP:   Recent Labs   Lab 11/28/20  0458   *   CALCIUM 8.0*   ALBUMIN 2.1*   PROT 5.1*      K 3.7   CO2 25      BUN 18   CREATININE 1.0   ALKPHOS 152*   ALT 28   AST 24   BILITOT 0.3     CRP: 9    Significant Diagnostics:  None    Assessment/Plan:     Status post repair of ventral hernia  70 y.o. M with hx of UC s/p total proctocolectomy and now s/p ex lap, lysis of adhesions, parastomal hernia repair with mesh (11/09) who presented to ED 11/13 with abdominal pain and decreased ostomy output. Admitted for observation and evaluation. Now awaiting improved ileostomy function.     - NG pulled yesterday without nausea; Pt. Kept NPO overnight.   - Will advance to CLD today   - Ostomy continues to function, greatly increased overnight from previous days   - PICC placed 11/23          - Continue TPN  - IR drain placement 11/23; cultures pending (NGTD)  - Will pull IR drain today  - Gout flare 11/23: started colchicine, low dose prednisone x5 days   - Appears resolved  - Continue home meds   - PT/OT following, patient ambulating halls and around room independently   - DVT: KAREN hose, lovenox        Dispo: continue floor care        Fatuma Zapien MD  General Surgery  [facility]

## 2020-11-28 NOTE — SUBJECTIVE & OBJECTIVE
"Interval History: NADENILSONMATTEO BEJARANO pulled yesterday. Patient denies nausea. Increased ostomy output, patient reports it "opened up." He would like to try liquids today. Reflux much improved.     Medications:  Continuous Infusions:   dextrose 10 % in water (D10W)      dextrose 5% lactated ringers 75 mL/hr at 11/28/20 0200    TPN ADULT CENTRAL LINE CUSTOM 42 mL/hr at 11/28/20 0200     Scheduled Meds:   allopurinoL  450 mg Oral Daily    amitriptyline  50 mg Oral QHS    amLODIPine  5 mg Oral Daily    aspirin  81 mg Oral Daily    carvediloL  12.5 mg Oral BID    colchicine  0.6 mg Oral Daily    enoxaparin  40 mg Subcutaneous Q24H    fat emulsion 20%  250 mL Intravenous Daily    gabapentin  600 mg Oral BID    metoclopramide HCl  10 mg Oral TID    pantoprazole  40 mg Oral Daily    potassium chloride  60 mEq Oral Once    predniSONE  5 mg Oral Daily    rosuvastatin  10 mg Oral QHS    simethicone  1 tablet Oral TID PC    sodium chloride 0.9%  10 mL Intravenous Q6H    tamsulosin  0.4 mg Oral Daily     PRN Meds:acetaminophen, dextrose 10 % in water (D10W), dextrose 50%, glucagon (human recombinant), HYDROmorphone, insulin aspart U-100, melatonin, methocarbamoL, ondansetron, oxyCODONE, sodium chloride 0.9%, Flushing PICC Protocol **AND** sodium chloride 0.9% **AND** sodium chloride 0.9%     Review of patient's allergies indicates:  No Known Allergies  Objective:     Vital Signs (Most Recent):  Temp: 96.1 °F (35.6 °C) (11/28/20 0349)  Pulse: 79 (11/28/20 0349)  Resp: 16 (11/28/20 0349)  BP: (!) 146/81 (11/28/20 0349)  SpO2: 95 % (11/28/20 0349) Vital Signs (24h Range):  Temp:  [96.1 °F (35.6 °C)-97.9 °F (36.6 °C)] 96.1 °F (35.6 °C)  Pulse:  [76-91] 79  Resp:  [16-18] 16  SpO2:  [93 %-98 %] 95 %  BP: ()/(61-86) 146/81     Weight: 95.3 kg (210 lb 1.6 oz)  Body mass index is 26.26 kg/m².    Intake/Output - Last 3 Shifts       11/26 0700 - 11/27 0659 11/27 0700 - 11/28 0659    P.O.  240    I.V. (mL/kg) 400 (4.2) " 492.5 (5.2)    TPN 13.3 647.1    Total Intake(mL/kg) 413.3 (4.3) 1379.6 (14.5)    Urine (mL/kg/hr) 675 (0.3) 1900 (0.8)    Drains 27.5 30    Stool 225 1150    Total Output 927.5 3080    Net -514.2 -1700.4          Stool Occurrence  0 x          Physical Exam  Constitutional:       Appearance: Normal appearance.   HENT:      Head: Normocephalic and atraumatic.      Nose: Nose normal.   Cardiovascular:      Rate and Rhythm: Normal rate and regular rhythm.   Pulmonary:      Effort: Pulmonary effort is normal. No respiratory distress.   Abdominal:      General: Abdomen is flat.      Palpations: Abdomen is soft.      Comments: Abdomen non-distended, soft, appropriately tender to palpation  Midline abdominal incision healing well, c/d/i   Left sided TABBY drain twith ss output  Right sided ostomy with gas and liquid stool in appliance    Musculoskeletal:      Right lower leg: No edema.      Left lower leg: No edema.   Skin:     General: Skin is warm and dry.   Neurological:      General: No focal deficit present.      Mental Status: He is alert.   Psychiatric:         Mood and Affect: Mood normal.         Behavior: Behavior normal.         Significant Labs:  CBC:   Recent Labs   Lab 11/28/20  0458   WBC 4.00   RBC 3.20*   HGB 8.7*   HCT 29.1*      MCV 91   MCH 27.2   MCHC 29.9*     CMP:   Recent Labs   Lab 11/28/20  0458   *   CALCIUM 8.0*   ALBUMIN 2.1*   PROT 5.1*      K 3.7   CO2 25      BUN 18   CREATININE 1.0   ALKPHOS 152*   ALT 28   AST 24   BILITOT 0.3     CRP: 9    Significant Diagnostics:  None

## 2020-11-28 NOTE — NURSING
POc reviewed with patient, verbalized understanding. AAOx4, VSS on RA. ML with dermabond, c/d/i. Ileostomy with liquid brown output. LLQ IR drain with serosanguinous drainage. DEN PICC with TPN @ 42ml/hr and D5LR @ 75ml/hr. BG checks Q6 performed, PRN insulin given for coverage. Pain managed with PRN pain medication. NPO, tolerating well. NG tube removed this shift by MD. Ambulated the halls this shift, independent, upt ot toilet, adequate UOP this shift. Bed in lowest position, wheels locked, call light in reach.

## 2020-11-29 LAB
ALBUMIN SERPL BCP-MCNC: 2.6 G/DL (ref 3.5–5.2)
ALP SERPL-CCNC: 151 U/L (ref 55–135)
ALT SERPL W/O P-5'-P-CCNC: 28 U/L (ref 10–44)
ANION GAP SERPL CALC-SCNC: 8 MMOL/L (ref 8–16)
AST SERPL-CCNC: 19 U/L (ref 10–40)
BASOPHILS # BLD AUTO: 0.03 K/UL (ref 0–0.2)
BASOPHILS NFR BLD: 0.6 % (ref 0–1.9)
BILIRUB SERPL-MCNC: 0.3 MG/DL (ref 0.1–1)
BUN SERPL-MCNC: 15 MG/DL (ref 8–23)
CALCIUM SERPL-MCNC: 8.5 MG/DL (ref 8.7–10.5)
CHLORIDE SERPL-SCNC: 106 MMOL/L (ref 95–110)
CO2 SERPL-SCNC: 26 MMOL/L (ref 23–29)
CREAT SERPL-MCNC: 1 MG/DL (ref 0.5–1.4)
CRP SERPL-MCNC: 6.6 MG/L (ref 0–8.2)
DIFFERENTIAL METHOD: ABNORMAL
EOSINOPHIL # BLD AUTO: 0.1 K/UL (ref 0–0.5)
EOSINOPHIL NFR BLD: 2.3 % (ref 0–8)
ERYTHROCYTE [DISTWIDTH] IN BLOOD BY AUTOMATED COUNT: 13.7 % (ref 11.5–14.5)
EST. GFR  (AFRICAN AMERICAN): >60 ML/MIN/1.73 M^2
EST. GFR  (NON AFRICAN AMERICAN): >60 ML/MIN/1.73 M^2
GLUCOSE SERPL-MCNC: 130 MG/DL (ref 70–110)
HCT VFR BLD AUTO: 32.2 % (ref 40–54)
HGB BLD-MCNC: 9.8 G/DL (ref 14–18)
IMM GRANULOCYTES # BLD AUTO: 0.05 K/UL (ref 0–0.04)
IMM GRANULOCYTES NFR BLD AUTO: 1 % (ref 0–0.5)
LYMPHOCYTES # BLD AUTO: 1.1 K/UL (ref 1–4.8)
LYMPHOCYTES NFR BLD: 22.4 % (ref 18–48)
MAGNESIUM SERPL-MCNC: 2.1 MG/DL (ref 1.6–2.6)
MCH RBC QN AUTO: 27.3 PG (ref 27–31)
MCHC RBC AUTO-ENTMCNC: 30.4 G/DL (ref 32–36)
MCV RBC AUTO: 90 FL (ref 82–98)
MONOCYTES # BLD AUTO: 0.4 K/UL (ref 0.3–1)
MONOCYTES NFR BLD: 8.6 % (ref 4–15)
NEUTROPHILS # BLD AUTO: 3.2 K/UL (ref 1.8–7.7)
NEUTROPHILS NFR BLD: 65.1 % (ref 38–73)
NRBC BLD-RTO: 0 /100 WBC
PHOSPHATE SERPL-MCNC: 4.3 MG/DL (ref 2.7–4.5)
PLATELET # BLD AUTO: 277 K/UL (ref 150–350)
PMV BLD AUTO: 10.6 FL (ref 9.2–12.9)
POCT GLUCOSE: 132 MG/DL (ref 70–110)
POCT GLUCOSE: 147 MG/DL (ref 70–110)
POCT GLUCOSE: 158 MG/DL (ref 70–110)
POCT GLUCOSE: 160 MG/DL (ref 70–110)
POTASSIUM SERPL-SCNC: 3.9 MMOL/L (ref 3.5–5.1)
PROT SERPL-MCNC: 5.9 G/DL (ref 6–8.4)
RBC # BLD AUTO: 3.59 M/UL (ref 4.6–6.2)
SODIUM SERPL-SCNC: 140 MMOL/L (ref 136–145)
WBC # BLD AUTO: 4.87 K/UL (ref 3.9–12.7)

## 2020-11-29 PROCEDURE — 25000003 PHARM REV CODE 250: Performed by: STUDENT IN AN ORGANIZED HEALTH CARE EDUCATION/TRAINING PROGRAM

## 2020-11-29 PROCEDURE — A4216 STERILE WATER/SALINE, 10 ML: HCPCS | Performed by: COLON & RECTAL SURGERY

## 2020-11-29 PROCEDURE — 84100 ASSAY OF PHOSPHORUS: CPT

## 2020-11-29 PROCEDURE — 63600175 PHARM REV CODE 636 W HCPCS: Performed by: SURGERY

## 2020-11-29 PROCEDURE — 25000003 PHARM REV CODE 250: Performed by: SURGERY

## 2020-11-29 PROCEDURE — 80053 COMPREHEN METABOLIC PANEL: CPT

## 2020-11-29 PROCEDURE — 25000003 PHARM REV CODE 250: Performed by: COLON & RECTAL SURGERY

## 2020-11-29 PROCEDURE — 86140 C-REACTIVE PROTEIN: CPT

## 2020-11-29 PROCEDURE — 83735 ASSAY OF MAGNESIUM: CPT

## 2020-11-29 PROCEDURE — 20600001 HC STEP DOWN PRIVATE ROOM

## 2020-11-29 PROCEDURE — 85025 COMPLETE CBC W/AUTO DIFF WBC: CPT

## 2020-11-29 RX ADMIN — AMLODIPINE BESYLATE 5 MG: 5 TABLET ORAL at 08:11

## 2020-11-29 RX ADMIN — INSULIN ASPART 2 UNITS: 100 INJECTION, SOLUTION INTRAVENOUS; SUBCUTANEOUS at 11:11

## 2020-11-29 RX ADMIN — METOCLOPRAMIDE 10 MG: 5 TABLET ORAL at 09:11

## 2020-11-29 RX ADMIN — CARVEDILOL 12.5 MG: 12.5 TABLET, FILM COATED ORAL at 09:11

## 2020-11-29 RX ADMIN — SIMETHICONE CHEW TAB 80 MG 80 MG: 80 TABLET ORAL at 09:11

## 2020-11-29 RX ADMIN — TAMSULOSIN HYDROCHLORIDE 0.4 MG: 0.4 CAPSULE ORAL at 08:11

## 2020-11-29 RX ADMIN — INSULIN ASPART 2 UNITS: 100 INJECTION, SOLUTION INTRAVENOUS; SUBCUTANEOUS at 06:11

## 2020-11-29 RX ADMIN — Medication 10 ML: at 06:11

## 2020-11-29 RX ADMIN — ENOXAPARIN SODIUM 40 MG: 40 INJECTION SUBCUTANEOUS at 05:11

## 2020-11-29 RX ADMIN — COLCHICINE 0.6 MG: 0.6 TABLET, FILM COATED ORAL at 08:11

## 2020-11-29 RX ADMIN — GABAPENTIN 600 MG: 300 CAPSULE ORAL at 08:11

## 2020-11-29 RX ADMIN — ROSUVASTATIN CALCIUM 10 MG: 10 TABLET, FILM COATED ORAL at 09:11

## 2020-11-29 RX ADMIN — ALLOPURINOL 450 MG: 300 TABLET ORAL at 08:11

## 2020-11-29 RX ADMIN — OXYCODONE HYDROCHLORIDE 5 MG: 5 TABLET ORAL at 02:11

## 2020-11-29 RX ADMIN — METOCLOPRAMIDE 10 MG: 5 TABLET ORAL at 02:11

## 2020-11-29 RX ADMIN — Medication 10 ML: at 11:11

## 2020-11-29 RX ADMIN — SIMETHICONE CHEW TAB 80 MG 80 MG: 80 TABLET ORAL at 02:11

## 2020-11-29 RX ADMIN — GABAPENTIN 600 MG: 300 CAPSULE ORAL at 09:11

## 2020-11-29 RX ADMIN — METOCLOPRAMIDE 10 MG: 5 TABLET ORAL at 08:11

## 2020-11-29 RX ADMIN — AMITRIPTYLINE HYDROCHLORIDE 50 MG: 50 TABLET, FILM COATED ORAL at 09:11

## 2020-11-29 RX ADMIN — CARVEDILOL 12.5 MG: 12.5 TABLET, FILM COATED ORAL at 08:11

## 2020-11-29 RX ADMIN — Medication 10 ML: at 12:11

## 2020-11-29 RX ADMIN — PANTOPRAZOLE SODIUM 40 MG: 40 TABLET, DELAYED RELEASE ORAL at 09:11

## 2020-11-29 RX ADMIN — Medication 10 ML: at 04:11

## 2020-11-29 RX ADMIN — ASPIRIN 81 MG: 81 TABLET, COATED ORAL at 08:11

## 2020-11-29 NOTE — NURSING
POC reviewed with patient, verbalized understanding. AAOx4, VSS on RA. ML with dermabond, c/d/i. Ileostomy with liquid brown output. LLQ IR drain  Removed by MD this AM, gauze and tape to site. DEN PICC with TPN @ 42ml/hr. BG checks Q6 performed, PRN insulin given for coverage. Pain managed with PRN pain medication. Clear liquid diet, tolerating well.  Ambulated the halls this shift, independent, upt to toilet, adequate UOP this shift. Bed in lowest position, wheels locked, call light in reach.

## 2020-11-29 NOTE — PLAN OF CARE
POC reviewed. AAOX4, VSS with no complaints of SOB, headaches, or dizziness. Urine output adequate. Tolerating clear liquid diet, TPN with Lipids, with consistent ostomy output and no complaints of N/V. Denies pain with no prn pain meds given. Blood glucose monitored q6h with insulin coverage given end of shift. Resting with side rails up and call light with in reach. Continuing to monitor patient status.     Please continue same dose Humalog and Lantus at this time  Once you bring in your sugar logs and I have a chance to review them we will call you with adjustments in her dose  Please schedule an appointment with me in 1 month so that we may review the follow-up sugar logs once her dose is changed as well  We had a discussion again today regarding your changes in kidney function along with significantly elevated sugars and the need to get much better control  You will continue follow-up with GI as scheduled for the follow-up EGD and colonoscopy in 3 months  Blood pressure is much better controlled with increase in medication dose

## 2020-11-29 NOTE — ASSESSMENT & PLAN NOTE
70 y.o. M with hx of UC s/p total proctocolectomy and now s/p ex lap, lysis of adhesions, parastomal hernia repair with mesh (11/09) who presented to ED 11/13 with abdominal pain and decreased ostomy output. Admitted for observation and evaluation. Now awaiting improved ileostomy function.     - Tolerated CLD with good ostomy function yesterday and overnight  - Will advance to regular diet today   - Will allow TPN to run out today, no new re-order  - PICC placed 11/23  - IR drain placement 11/23; cultures pending (NGTD  - Gout flare 11/23: started colchicine, low dose prednisone x5 days   - Now resolved  - Continue home meds   - PT/OT following, patient ambulating halls and around room independently   - DVT: KAREN hose, lovenox        Dispo: Potential DC tomorrow if pt. continues to tolerate oral intake with appropriate ostomy function

## 2020-11-29 NOTE — PROGRESS NOTES
[facility]  General Surgery  Progress Note    Subjective:     History of Present Illness:  Jarrett Charlton Jr. is a 70 y.o. M with hx of UC s/p total proctocolectomy and now 6 days post op from ex lap, lysis of adhesions, parastomal hernia repair with mesh (kaylee) who presents to the ED with bloating and decreased ileostomy output.  He had presented 2 nights ago for the same.  He states that he is afraid to eat.  Having pain around his ileostomy site.  In the ED, labs grossly unremarkable.  CT scan shows no signs of obstruction, fluid collection around mesh unchanged from previous scan.  CRS was consulted for evaluation     Post-Op Info:  * No surgery found *         Interval History: NAEON. Tolerated clear liquid diet without nausea/emesis. Ostomy function appropriate. He reports feeling well and would like to try regular food today.     Medications:  Continuous Infusions:   dextrose 10 % in water (D10W)      TPN ADULT CENTRAL LINE CUSTOM 42 mL/hr at 11/28/20 2244     Scheduled Meds:   allopurinoL  450 mg Oral Daily    amitriptyline  50 mg Oral QHS    amLODIPine  5 mg Oral Daily    aspirin  81 mg Oral Daily    carvediloL  12.5 mg Oral BID    colchicine  0.6 mg Oral Daily    enoxaparin  40 mg Subcutaneous Q24H    fat emulsion 20%  250 mL Intravenous Daily    gabapentin  600 mg Oral BID    metoclopramide HCl  10 mg Oral TID    pantoprazole  40 mg Oral Daily    potassium chloride  60 mEq Oral Once    rosuvastatin  10 mg Oral QHS    simethicone  1 tablet Oral TID PC    sodium chloride 0.9%  10 mL Intravenous Q6H    tamsulosin  0.4 mg Oral Daily     PRN Meds:acetaminophen, dextrose 10 % in water (D10W), dextrose 50%, glucagon (human recombinant), HYDROmorphone, insulin aspart U-100, melatonin, methocarbamoL, ondansetron, oxyCODONE, sodium chloride 0.9%, Flushing PICC Protocol **AND** sodium chloride 0.9% **AND** sodium chloride 0.9%     Review of patient's allergies  indicates:  No Known Allergies  Objective:     Vital Signs (Most Recent):  Temp: 98.4 °F (36.9 °C) (11/29/20 0446)  Pulse: 83 (11/29/20 0446)  Resp: 18 (11/29/20 0446)  BP: 115/69 (11/29/20 0446)  SpO2: 96 % (11/29/20 0446) Vital Signs (24h Range):  Temp:  [97.4 °F (36.3 °C)-98.6 °F (37 °C)] 98.4 °F (36.9 °C)  Pulse:  [76-88] 83  Resp:  [16-20] 18  SpO2:  [94 %-98 %] 96 %  BP: (115-141)/(68-81) 115/69     Weight: 95.3 kg (210 lb 1.6 oz)  Body mass index is 26.26 kg/m².    Intake/Output - Last 3 Shifts       11/27 0700 - 11/28 0659 11/28 0700 - 11/29 0659 11/29 0700 - 11/30 0659    P.O. 240      I.V. (mL/kg) 492.5 (5.2)      .1      Total Intake(mL/kg) 1379.6 (14.5)      Urine (mL/kg/hr) 1900 (0.8) 1975 (0.9)     Emesis/NG output  0     Drains 30      Other  0     Stool 1150 1075     Blood  0     Total Output 3080 3050     Net -1700.4 -3050            Stool Occurrence 0 x 0 x           Physical Exam  Vitals signs and nursing note reviewed.   Constitutional:       Appearance: Normal appearance.   HENT:      Head: Normocephalic and atraumatic.   Cardiovascular:      Rate and Rhythm: Normal rate and regular rhythm.   Pulmonary:      Effort: Pulmonary effort is normal. No respiratory distress.   Abdominal:      Comments: RLQ ostomy with soft stool in appliance  Midline abdominal incision well healed  Left sided drain site c/d/i with gauze dressing in place    Skin:     General: Skin is warm and dry.   Neurological:      General: No focal deficit present.      Mental Status: He is alert and oriented to person, place, and time.   Psychiatric:         Mood and Affect: Mood normal.         Behavior: Behavior normal.         Significant Labs:  CBC:   Recent Labs   Lab 11/29/20  0345   WBC 4.87   RBC 3.59*   HGB 9.8*   HCT 32.2*      MCV 90   MCH 27.3   MCHC 30.4*     CMP:   Recent Labs   Lab 11/29/20  0416   *   CALCIUM 8.5*   ALBUMIN 2.6*   PROT 5.9*      K 3.9   CO2 26      BUN 15    CREATININE 1.0   ALKPHOS 151*   ALT 28   AST 19   BILITOT 0.3     CRP: 6.6    Significant Diagnostics:  None    Assessment/Plan:     Status post repair of ventral hernia  70 y.o. M with hx of UC s/p total proctocolectomy and now s/p ex lap, lysis of adhesions, parastomal hernia repair with mesh (11/09) who presented to ED 11/13 with abdominal pain and decreased ostomy output. Admitted for observation and evaluation. IR drainage of abdominal wall fluid collection 11/23 and prolonged ileus requiring NG decompression and TPN. Ostomy output now returned and tolerating oral diet.     - Tolerated CLD with good ostomy function yesterday and overnight  - Will advance to low fiber diet today   - Will Continue TPN, DC tomorrow if he tolerates diet    - PICC placed 11/23  - IR drain placement 11/23; cultures pending (NGTD  - Gout flare 11/23: started colchicine, low dose prednisone x5 days   - Now resolved  - Continue home meds   - PT/OT following, patient ambulating halls and around room independently   - DVT: KAREN hose, lovenox        Dispo: Potential DC tomorrow if pt. continues to tolerate oral intake with appropriate ostomy function        Fatuma Zapien MD  General Surgery  [facility]

## 2020-11-29 NOTE — SUBJECTIVE & OBJECTIVE
Interval History: NAEON. Tolerated clear liquid diet without nausea/emesis. Ostomy function appropriate. He reports feeling well and would like to try regular food today.     Medications:  Continuous Infusions:   dextrose 10 % in water (D10W)      TPN ADULT CENTRAL LINE CUSTOM 42 mL/hr at 11/28/20 2244     Scheduled Meds:   allopurinoL  450 mg Oral Daily    amitriptyline  50 mg Oral QHS    amLODIPine  5 mg Oral Daily    aspirin  81 mg Oral Daily    carvediloL  12.5 mg Oral BID    colchicine  0.6 mg Oral Daily    enoxaparin  40 mg Subcutaneous Q24H    fat emulsion 20%  250 mL Intravenous Daily    gabapentin  600 mg Oral BID    metoclopramide HCl  10 mg Oral TID    pantoprazole  40 mg Oral Daily    potassium chloride  60 mEq Oral Once    rosuvastatin  10 mg Oral QHS    simethicone  1 tablet Oral TID PC    sodium chloride 0.9%  10 mL Intravenous Q6H    tamsulosin  0.4 mg Oral Daily     PRN Meds:acetaminophen, dextrose 10 % in water (D10W), dextrose 50%, glucagon (human recombinant), HYDROmorphone, insulin aspart U-100, melatonin, methocarbamoL, ondansetron, oxyCODONE, sodium chloride 0.9%, Flushing PICC Protocol **AND** sodium chloride 0.9% **AND** sodium chloride 0.9%     Review of patient's allergies indicates:  No Known Allergies  Objective:     Vital Signs (Most Recent):  Temp: 98.4 °F (36.9 °C) (11/29/20 0446)  Pulse: 83 (11/29/20 0446)  Resp: 18 (11/29/20 0446)  BP: 115/69 (11/29/20 0446)  SpO2: 96 % (11/29/20 0446) Vital Signs (24h Range):  Temp:  [97.4 °F (36.3 °C)-98.6 °F (37 °C)] 98.4 °F (36.9 °C)  Pulse:  [76-88] 83  Resp:  [16-20] 18  SpO2:  [94 %-98 %] 96 %  BP: (115-141)/(68-81) 115/69     Weight: 95.3 kg (210 lb 1.6 oz)  Body mass index is 26.26 kg/m².    Intake/Output - Last 3 Shifts       11/27 0700 - 11/28 0659 11/28 0700 - 11/29 0659 11/29 0700 - 11/30 0659    P.O. 240      I.V. (mL/kg) 492.5 (5.2)      .1      Total Intake(mL/kg) 1379.6 (14.5)      Urine (mL/kg/hr) 1900  (0.8) 1975 (0.9)     Emesis/NG output  0     Drains 30      Other  0     Stool 1150 1075     Blood  0     Total Output 3080 3050     Net -1700.4 -3050            Stool Occurrence 0 x 0 x           Physical Exam  Vitals signs and nursing note reviewed.   Constitutional:       Appearance: Normal appearance.   HENT:      Head: Normocephalic and atraumatic.   Cardiovascular:      Rate and Rhythm: Normal rate and regular rhythm.   Pulmonary:      Effort: Pulmonary effort is normal. No respiratory distress.   Abdominal:      Comments: RLQ ostomy with soft stool in appliance  Midline abdominal incision well healed  Left sided drain site c/d/i with gauze dressing in place    Skin:     General: Skin is warm and dry.   Neurological:      General: No focal deficit present.      Mental Status: He is alert and oriented to person, place, and time.   Psychiatric:         Mood and Affect: Mood normal.         Behavior: Behavior normal.         Significant Labs:  CBC:   Recent Labs   Lab 11/29/20  0345   WBC 4.87   RBC 3.59*   HGB 9.8*   HCT 32.2*      MCV 90   MCH 27.3   MCHC 30.4*     CMP:   Recent Labs   Lab 11/29/20  0416   *   CALCIUM 8.5*   ALBUMIN 2.6*   PROT 5.9*      K 3.9   CO2 26      BUN 15   CREATININE 1.0   ALKPHOS 151*   ALT 28   AST 19   BILITOT 0.3     CRP: 6.6    Significant Diagnostics:  None

## 2020-11-30 VITALS
DIASTOLIC BLOOD PRESSURE: 71 MMHG | HEART RATE: 79 BPM | SYSTOLIC BLOOD PRESSURE: 121 MMHG | BODY MASS INDEX: 26.13 KG/M2 | HEIGHT: 75 IN | OXYGEN SATURATION: 95 % | RESPIRATION RATE: 16 BRPM | TEMPERATURE: 98 F | WEIGHT: 210.13 LBS

## 2020-11-30 PROBLEM — R10.9 ABDOMINAL PAIN: Status: RESOLVED | Noted: 2020-11-14 | Resolved: 2020-11-30

## 2020-11-30 LAB
ALBUMIN SERPL BCP-MCNC: 2.3 G/DL (ref 3.5–5.2)
ALP SERPL-CCNC: 130 U/L (ref 55–135)
ALT SERPL W/O P-5'-P-CCNC: 23 U/L (ref 10–44)
ANION GAP SERPL CALC-SCNC: 9 MMOL/L (ref 8–16)
AST SERPL-CCNC: 17 U/L (ref 10–40)
BACTERIA SPEC ANAEROBE CULT: NORMAL
BILIRUB SERPL-MCNC: 0.3 MG/DL (ref 0.1–1)
BUN SERPL-MCNC: 24 MG/DL (ref 8–23)
CALCIUM SERPL-MCNC: 7.9 MG/DL (ref 8.7–10.5)
CHLORIDE SERPL-SCNC: 109 MMOL/L (ref 95–110)
CO2 SERPL-SCNC: 23 MMOL/L (ref 23–29)
CREAT SERPL-MCNC: 1.2 MG/DL (ref 0.5–1.4)
EST. GFR  (AFRICAN AMERICAN): >60 ML/MIN/1.73 M^2
EST. GFR  (NON AFRICAN AMERICAN): >60 ML/MIN/1.73 M^2
GLUCOSE SERPL-MCNC: 137 MG/DL (ref 70–110)
MAGNESIUM SERPL-MCNC: 2 MG/DL (ref 1.6–2.6)
PHOSPHATE SERPL-MCNC: 4.8 MG/DL (ref 2.7–4.5)
POCT GLUCOSE: 137 MG/DL (ref 70–110)
POTASSIUM SERPL-SCNC: 4.1 MMOL/L (ref 3.5–5.1)
PROT SERPL-MCNC: 5.4 G/DL (ref 6–8.4)
SODIUM SERPL-SCNC: 141 MMOL/L (ref 136–145)

## 2020-11-30 PROCEDURE — A4216 STERILE WATER/SALINE, 10 ML: HCPCS | Performed by: COLON & RECTAL SURGERY

## 2020-11-30 PROCEDURE — 80053 COMPREHEN METABOLIC PANEL: CPT

## 2020-11-30 PROCEDURE — 25000003 PHARM REV CODE 250: Performed by: STUDENT IN AN ORGANIZED HEALTH CARE EDUCATION/TRAINING PROGRAM

## 2020-11-30 PROCEDURE — 25000003 PHARM REV CODE 250: Performed by: COLON & RECTAL SURGERY

## 2020-11-30 PROCEDURE — 84100 ASSAY OF PHOSPHORUS: CPT

## 2020-11-30 PROCEDURE — B4185 PARENTERAL SOL 10 GM LIPIDS: HCPCS | Performed by: SURGERY

## 2020-11-30 PROCEDURE — 63600175 PHARM REV CODE 636 W HCPCS: Performed by: SURGERY

## 2020-11-30 PROCEDURE — A4217 STERILE WATER/SALINE, 500 ML: HCPCS | Performed by: SURGERY

## 2020-11-30 PROCEDURE — 83735 ASSAY OF MAGNESIUM: CPT

## 2020-11-30 PROCEDURE — 25000003 PHARM REV CODE 250: Performed by: SURGERY

## 2020-11-30 RX ORDER — OXYCODONE HYDROCHLORIDE 5 MG/1
5 TABLET ORAL EVERY 6 HOURS PRN
Qty: 16 TABLET | Refills: 0 | Status: SHIPPED | OUTPATIENT
Start: 2020-11-30 | End: 2020-12-05

## 2020-11-30 RX ADMIN — CARVEDILOL 12.5 MG: 12.5 TABLET, FILM COATED ORAL at 08:11

## 2020-11-30 RX ADMIN — TAMSULOSIN HYDROCHLORIDE 0.4 MG: 0.4 CAPSULE ORAL at 08:11

## 2020-11-30 RX ADMIN — SIMETHICONE CHEW TAB 80 MG 80 MG: 80 TABLET ORAL at 08:11

## 2020-11-30 RX ADMIN — I.V. FAT EMULSION 250 ML: 20 EMULSION INTRAVENOUS at 12:11

## 2020-11-30 RX ADMIN — METOCLOPRAMIDE 10 MG: 5 TABLET ORAL at 08:11

## 2020-11-30 RX ADMIN — ASPIRIN 81 MG: 81 TABLET, COATED ORAL at 08:11

## 2020-11-30 RX ADMIN — Medication 10 ML: at 05:11

## 2020-11-30 RX ADMIN — GABAPENTIN 600 MG: 300 CAPSULE ORAL at 08:11

## 2020-11-30 RX ADMIN — MAGNESIUM SULFATE HEPTAHYDRATE: 500 INJECTION, SOLUTION INTRAMUSCULAR; INTRAVENOUS at 12:11

## 2020-11-30 RX ADMIN — ALLOPURINOL 450 MG: 300 TABLET ORAL at 08:11

## 2020-11-30 RX ADMIN — PANTOPRAZOLE SODIUM 40 MG: 40 TABLET, DELAYED RELEASE ORAL at 08:11

## 2020-11-30 RX ADMIN — COLCHICINE 0.6 MG: 0.6 TABLET, FILM COATED ORAL at 08:11

## 2020-11-30 RX ADMIN — AMLODIPINE BESYLATE 5 MG: 5 TABLET ORAL at 08:11

## 2020-11-30 NOTE — NURSING
Pt discharged to Home with Family care. Discharge instructions verbally given and hard copy provided to pt. Hard copy prescription delivered bedside. Removed DEN Picc  with cath tip in place. Pt tolerated removal well with bleeding controlled. Pt remains free of falls with no acute pain or distress noted. Pt left floor via wheelchair with transport.

## 2020-11-30 NOTE — NURSING
POC reviewed with patient, verbalized understanding. AAOx4, VSS on RA. ML with dermabond, c/d/i. Ileostomy with liquid brown output. DEN PICC with TPN @ 42ml/hr. BG checks Q6 performed, PRN insulin given for coverage. Pain managed with PRN pain medication. Low fiber/low residue diet, tolerating well.  Ambulated the halls this shift, independent, up to toilet, adequate UOP this shift. Bed in lowest position, wheels locked, call light in reach.

## 2020-11-30 NOTE — PROGRESS NOTES
[facility]  General Surgery  Progress Note    Subjective:     History of Present Illness:  Jarrett Charlton Jr. is a 70 y.o. M with hx of UC s/p total proctocolectomy and now 6 days post op from ex lap, lysis of adhesions, parastomal hernia repair with mesh (osielker) who presents to the ED with bloating and decreased ileostomy output.  He had presented 2 nights ago for the same.  He states that he is afraid to eat.  Having pain around his ileostomy site.  In the ED, labs grossly unremarkable.  CT scan shows no signs of obstruction, fluid collection around mesh unchanged from previous scan.  CRS was consulted for evaluation     Post-Op Info:  * No surgery found *         Interval History: NAEON. Started on low residue diet, patient tolerated well without nausea.     Medications:  Continuous Infusions:   dextrose 10 % in water (D10W)      TPN ADULT CENTRAL LINE CUSTOM 42 mL/hr at 11/30/20 0025     Scheduled Meds:   allopurinoL  450 mg Oral Daily    amitriptyline  50 mg Oral QHS    amLODIPine  5 mg Oral Daily    aspirin  81 mg Oral Daily    carvediloL  12.5 mg Oral BID    colchicine  0.6 mg Oral Daily    enoxaparin  40 mg Subcutaneous Q24H    fat emulsion 20%  250 mL Intravenous Daily    gabapentin  600 mg Oral BID    metoclopramide HCl  10 mg Oral TID    pantoprazole  40 mg Oral Daily    potassium chloride  60 mEq Oral Once    rosuvastatin  10 mg Oral QHS    simethicone  1 tablet Oral TID PC    sodium chloride 0.9%  10 mL Intravenous Q6H    tamsulosin  0.4 mg Oral Daily     PRN Meds:acetaminophen, dextrose 10 % in water (D10W), dextrose 50%, glucagon (human recombinant), HYDROmorphone, insulin aspart U-100, melatonin, methocarbamoL, ondansetron, oxyCODONE, sodium chloride 0.9%, Flushing PICC Protocol **AND** sodium chloride 0.9% **AND** sodium chloride 0.9%     Review of patient's allergies indicates:  No Known Allergies  Objective:     Vital Signs (Most Recent):  Temp: 97.9 °F  (36.6 °C) (11/30/20 0410)  Pulse: 77 (11/30/20 0410)  Resp: 16 (11/30/20 0410)  BP: 128/78 (11/30/20 0410)  SpO2: 98 % (11/30/20 0410) Vital Signs (24h Range):  Temp:  [97.7 °F (36.5 °C)-98.4 °F (36.9 °C)] 97.9 °F (36.6 °C)  Pulse:  [76-82] 77  Resp:  [16-18] 16  SpO2:  [96 %-98 %] 98 %  BP: (105-134)/(68-80) 128/78     Weight: 95.3 kg (210 lb 1.6 oz)  Body mass index is 26.26 kg/m².    Intake/Output - Last 3 Shifts       11/28 0700 - 11/29 0659 11/29 0700 - 11/30 0659    Urine (mL/kg/hr) 1975 (0.9) 1450 (0.6)    Emesis/NG output 0     Other 0     Stool 1075 700    Blood 0     Total Output 3050 2150    Net -3050 -2150          Stool Occurrence 0 x           Physical Exam  Vitals signs and nursing note reviewed.   Constitutional:       Appearance: Normal appearance.   HENT:      Head: Normocephalic.   Cardiovascular:      Rate and Rhythm: Normal rate and regular rhythm.   Pulmonary:      Effort: Pulmonary effort is normal. No respiratory distress.   Abdominal:      General: Abdomen is flat.      Palpations: Abdomen is soft.      Comments: Midline abdominal incision healing well  RLQ ostomy with soft stool and liquid in appliance    Musculoskeletal:      Right lower leg: No edema.      Left lower leg: No edema.   Skin:     General: Skin is warm and dry.   Neurological:      General: No focal deficit present.      Mental Status: He is alert and oriented to person, place, and time.   Psychiatric:         Mood and Affect: Mood normal.         Behavior: Behavior normal.         Significant Labs:  AM labs pending      Assessment/Plan:     Status post repair of ventral hernia  70 y.o. M with hx of UC s/p total proctocolectomy and now s/p ex lap, lysis of adhesions, parastomal hernia repair with mesh (11/09) who presented to ED 11/13 with abdominal pain and decreased ostomy output. Admitted for observation and evaluation. Now awaiting improved ileostomy function.     - Tolerated low residue diet with good ostomy function.  -  Will allow TPN to run out today, no new re-order  - PICC placed 11/23  - IR drain placement 11/23; cultures pending (NGTD  - Gout flare 11/23: started colchicine, low dose prednisone x5 days   - Now resolved  - Continue home meds   - PT/OT following, patient ambulating halls and around room independently   - DVT: KAREN hose, lovenox        Dispo: Potential DC today or tomorrow         Fatuma Zapien MD  General Surgery  [facility] [address]

## 2020-11-30 NOTE — ASSESSMENT & PLAN NOTE
70 y.o. M with hx of UC s/p total proctocolectomy and now s/p ex lap, lysis of adhesions, parastomal hernia repair with mesh (11/09) who presented to ED 11/13 with abdominal pain and decreased ostomy output. Admitted for observation and evaluation. Now awaiting improved ileostomy function.     - Tolerated low residue diet with good ostomy function.  - Will allow TPN to run out today, no new re-order  - PICC placed 11/23  - IR drain placement 11/23; cultures pending (NGTD  - Gout flare 11/23: started colchicine, low dose prednisone x5 days   - Now resolved  - Continue home meds   - PT/OT following, patient ambulating halls and around room independently   - DVT: KAREN hose, lovenox        Dispo: Potential DC today or tomorrow

## 2020-11-30 NOTE — PLAN OF CARE
POC reviewed with pt. Pt verbalized understanding. AAOX'4. VSS on RA. Pt is on low fiber/low residue diet tolerating well. Pt has TPN @ 42ml/hr tolerating well.Pt ostomy is CDI with adequate output. Incision is CDI. No c/o pain, nausea,or vomiting. Pt ambulated hallways x'2 w/o complication. No acute events. Bed in lowest position with call light within reach. TM.

## 2020-11-30 NOTE — SUBJECTIVE & OBJECTIVE
Interval History: NAEON. Started on low residue diet, patient tolerated well without nausea.     Medications:  Continuous Infusions:   dextrose 10 % in water (D10W)      TPN ADULT CENTRAL LINE CUSTOM 42 mL/hr at 11/30/20 0025     Scheduled Meds:   allopurinoL  450 mg Oral Daily    amitriptyline  50 mg Oral QHS    amLODIPine  5 mg Oral Daily    aspirin  81 mg Oral Daily    carvediloL  12.5 mg Oral BID    colchicine  0.6 mg Oral Daily    enoxaparin  40 mg Subcutaneous Q24H    fat emulsion 20%  250 mL Intravenous Daily    gabapentin  600 mg Oral BID    metoclopramide HCl  10 mg Oral TID    pantoprazole  40 mg Oral Daily    potassium chloride  60 mEq Oral Once    rosuvastatin  10 mg Oral QHS    simethicone  1 tablet Oral TID PC    sodium chloride 0.9%  10 mL Intravenous Q6H    tamsulosin  0.4 mg Oral Daily     PRN Meds:acetaminophen, dextrose 10 % in water (D10W), dextrose 50%, glucagon (human recombinant), HYDROmorphone, insulin aspart U-100, melatonin, methocarbamoL, ondansetron, oxyCODONE, sodium chloride 0.9%, Flushing PICC Protocol **AND** sodium chloride 0.9% **AND** sodium chloride 0.9%     Review of patient's allergies indicates:  No Known Allergies  Objective:     Vital Signs (Most Recent):  Temp: 97.9 °F (36.6 °C) (11/30/20 0410)  Pulse: 77 (11/30/20 0410)  Resp: 16 (11/30/20 0410)  BP: 128/78 (11/30/20 0410)  SpO2: 98 % (11/30/20 0410) Vital Signs (24h Range):  Temp:  [97.7 °F (36.5 °C)-98.4 °F (36.9 °C)] 97.9 °F (36.6 °C)  Pulse:  [76-82] 77  Resp:  [16-18] 16  SpO2:  [96 %-98 %] 98 %  BP: (105-134)/(68-80) 128/78     Weight: 95.3 kg (210 lb 1.6 oz)  Body mass index is 26.26 kg/m².    Intake/Output - Last 3 Shifts       11/28 0700 - 11/29 0659 11/29 0700 - 11/30 0659    Urine (mL/kg/hr) 1975 (0.9) 1450 (0.6)    Emesis/NG output 0     Other 0     Stool 1075 700    Blood 0     Total Output 3050 2150    Net -3050 -2150          Stool Occurrence 0 x           Physical Exam  Vitals signs and  nursing note reviewed.   Constitutional:       Appearance: Normal appearance.   HENT:      Head: Normocephalic.   Cardiovascular:      Rate and Rhythm: Normal rate and regular rhythm.   Pulmonary:      Effort: Pulmonary effort is normal. No respiratory distress.   Abdominal:      General: Abdomen is flat.      Palpations: Abdomen is soft.      Comments: Midline abdominal incision healing well  RLQ ostomy with soft stool and liquid in appliance    Musculoskeletal:      Right lower leg: No edema.      Left lower leg: No edema.   Skin:     General: Skin is warm and dry.   Neurological:      General: No focal deficit present.      Mental Status: He is alert and oriented to person, place, and time.   Psychiatric:         Mood and Affect: Mood normal.         Behavior: Behavior normal.         Significant Labs:  AM labs pending

## 2020-12-01 ENCOUNTER — PATIENT OUTREACH (OUTPATIENT)
Dept: ADMINISTRATIVE | Facility: CLINIC | Age: 70
End: 2020-12-01

## 2020-12-01 NOTE — PATIENT INSTRUCTIONS
After Laparoscopic Hernia Repair  You had a procedure called laparoscopic hernia repair. A hernia is a defect in the tough tissue covering the musculature of the abdominal wall (fascia). During laparoscopic hernia surgery, a surgeon inserts a telescope attached to a camera as well as surgical instruments through tiny incisions in your abdomen. The surgeon repairs the hernia with a mesh, which patches the tear or weakness in the fascia.  Home care  · Note that your shoulder may feel tight or your neck may be stiff for 24 to 48 hours after your surgery. This is common and usually lasts a short time. You may also have numbness around the incision area.  · Keep doing the coughing and deep breathing exercises that you learned in the hospital. These will help to prevent lung infection.  · Prevent constipation so you dont strain when going to the bathroom. Eat fruits, vegetables, and whole grains. Drink 6 to 8 glasses of water a day, unless otherwise directed. Use a laxative or a mild stool softener if your healthcare provider says its OK.  · Wash your incision with mild soap and water. Pat it dry. Dont use oil, powder, or lotion on your incision.  · Shower or take baths as instructed by your healthcare provider. Instructions will vary based on how your incision was closed and how its healing. It may be closed with glue, sutures, or staples. Your healthcare provider may have different advice for each kind.  Activity  · Ask others to help with chores and errands while you recover.  · Dont lift anything heavier than 10 pounds until your healthcare provider says its OK.  · Dont mow the lawn, use a vacuum , or do other strenuous activities until your healthcare provider says it's OK.  · Climb stairs slowly and pause after every few steps.  · Walk as often as you feel able.  · Ask your healthcare provider when you can drive again. This may be when you stop taking pain medicine and can move comfortably from side  to side. Dont drive if you are still taking opioid pain medicine.  When to call your healthcare provider   Call your healthcare provider right away if you have any of the following:  · Pain, bleeding, redness, or fluid at the incision site that gets worse  · Fever of 100.4°F (38°C) or higher, or as directed by your healthcare provider  · Vomiting or nausea that doesnt go away  · Inability to urinate  · No bowel movement after 3 days  · Swelling in abdomen or groin that gets worse  · Pain thats not relieved by medicine   Date Last Reviewed: 10/1/2016  © 0317-1532 [employer] [address] [place] [address], [place] [address]. All rights reserved. This information is not intended as a substitute for professional medical care. Always follow your healthcare professional's instructions.

## 2020-12-01 NOTE — DISCHARGE SUMMARY
[facility]  General Surgery  Discharge Summary      Patient Name: Jarrett Charlton Jr.  MRN: [id number]  Admission Date: 11/15/2020  Hospital Length of Stay: 15 days  Discharge Date and Time: 11/30/2020 12:07 PM  Attending Physician: No att. providers found   Discharging Provider: Fatuma Zapien MD  Primary Care Provider: Poli Gonzalez MD    HPI:   Jarrett Charlton Jr. is a 70 y.o. M with hx of UC s/p total proctocolectomy and now 6 days post op from ex lap, lysis of adhesions, parastomal hernia repair with mesh (sugarbaker) who presents to the ED with bloating and decreased ileostomy output.  He had presented 2 nights ago for the same.  He states that he is afraid to eat.  Having pain around his ileostomy site.  In the ED, labs grossly unremarkable.  CT scan shows no signs of obstruction, fluid collection around mesh unchanged from previous scan.  CRS was consulted for evaluation      Indwelling Lines/Drains at time of discharge:   Lines/Drains/Airways     Peripherally Inserted Central Catheter Line            PICC Double Lumen 11/23/20 1132 right brachial 7 days          Drain                 Ileostomy 01/07/19 1101  days              Hospital Course:   Mr. Charlton was admitted 11/15/2020 from the ED for inability to tolerate PO intake and abdominal pain. CT demonstrated abdominal wall fluid collection likely post-surgical in nature as opposed to abscess. Small bowel follow through demonstrated findings consistent with ileus. Mr. Charlton had low ostomy output for several days and eventually required NGT placement for ongoing nausea and PO intolerance. He failed to progress and repeat CT was obtained 11/21 which showed persistence of abdominal wall fluid collection without interval change, as well as diffusely dilated small bowel proximal to stoma without clear transition point. IR conducted image guided drainage of this fluid collection 11/23. He had persistent ileus after this procedure and  was placed on TPN for nutrition. Ileus continued until spontaneous ostomy output increased 11/27. NGT was pulled and his diet was advanced. He was discharged 11/30 tolerating a regular diet with adequate ostomy output and outpatient clinic follow up scheduled.     Consults:   Consults (From admission, onward)        Status Ordering Provider     Inpatient consult to Colorectal Surgery  Once     Provider:  (Not yet assigned)    Completed KARTIK LATHAM     Inpatient consult to Interventional Radiology  Once     Provider:  (Not yet assigned)    Completed FREDY WILKERSON     Inpatient consult to PICC team ([facility])  Once     Provider:  (Not yet assigned)    Completed FREDY WILKERSON          Significant Diagnostic Studies:     CT 11/15  Impression  Postoperative change of right lower quadrant end ileostomy with recent surgical correction of small-bowel obstruction.  Redemonstration of a fluid collection with multiple foci of air measuring up to 12.6 cm in the anterior abdominal wall extending through the ileostomy wall defect, concerning for intra-abdominal abscess.  This collection appears overall unchanged compared to prior exam 11/13/2020.     Small volume intraperitoneal air consistent with recent operation.     Stable diffuse mesenteric edema and trace free fluid in the lower abdomen.    Small bowel Follow through 11/17  Multiple prominent loops of small bowel with slightly diminished motility on fluoroscopic imaging with failure to recognize contrast passage to patient's ostomy.  No definite obstructive process is visualized, however partial obstructive process cannot be completely excluded given lack of visualization of contrast through entirety of small bowel.  Prolonged transit time and decreased motility suggest ileus.    CT 11/21  Impression:     1. Dilated loops of bowel without transition point identified, most compatible with postoperative ileus.  2. Postoperative changes of total colectomy with  right lower quadrant ileostomy and recent parastomal hernia repair.  Right lower quadrant fluid collection is overall unchanged in size.  3. Mesenteric edema and mild wall thickening of the lower abdominal and right lower quadrant bowel loops, likely reactive.  4. Continued mild nonspecific prominence of the CBD.  5. Small right pleural effusion.    IR Fluid Drainage Cultures 11/23  Specimen Information: Abdomen; Body Fluid        Component 8d ago   Aerobic Bacterial Culture No growth            Specimen Information: Abdomen; Body Fluid        Component 8d ago   Anaerobic Culture No anaerobes isolated            Specimen Information: Abdomen; Body Fluid        Component 8d ago   Fungus (Mycology) Culture Culture in progress P            Specimen Information: Abdomen; Body Fluid        Component 8d ago   Gram Stain Result Rare WBC's    Gram Stain Result No organisms seen                        Final Active Diagnoses:    Diagnosis Date Noted POA    PRINCIPAL PROBLEM:  Status post repair of ventral hernia [Z98.890, Z87.19] 11/16/2020 Not Applicable      Problems Resolved During this Admission:    Diagnosis Date Noted Date Resolved POA    Abdominal pain [R10.9] 11/14/2020 11/30/2020 Yes      Discharged Condition: good    Disposition: Home or Self Care    Follow Up:  Follow-up Information     Call Poli Gonzalez MD.    Specialties: Internal Medicine, Pediatrics  Why: As needed  Contact information:  [address] [place] [address]  [phone]             W Homero Haley MD On 12/15/2020.    Specialty: Colon and Rectal Surgery  Why: Hospital Follow-up at 9:40 AM   Contact information:  [address] [facility] [address]  [phone]                 Patient Instructions:      Diet Adult Regular     Lifting restrictions   Order Comments: No lifting objects over 30 lbs for 6 weeks     No dressing needed     Notify your health care provider if you experience any of the following:  temperature  >100.4     Notify your health care provider if you experience any of the following:  persistent nausea and vomiting or diarrhea     Notify your health care provider if you experience any of the following:  severe uncontrolled pain     Notify your health care provider if you experience any of the following:  redness, tenderness, or signs of infection (pain, swelling, redness, odor or green/yellow discharge around incision site)     Notify your health care provider if you experience any of the following:  worsening rash     Notify your health care provider if you experience any of the following:  persistent dizziness, light-headedness, or visual disturbances     Notify your health care provider if you experience any of the following:  increased confusion or weakness     Shower on day dressing removed (No bath)   Order Comments: Please shower daily. Do not submerge or soak incision. Do not scrub incision. You may allow water and gentle soap to run over the incision while showering.     Medications:  Reconciled Home Medications:      Medication List      CONTINUE taking these medications    ACCU-CHEK PAUL CONTROL SOLN Soln  Generic drug: blood glucose control high,low  1 drop by NOT APPLICABLE route once daily.     ACCU-CHEK SOFTCLIX LANCETS Misc  Generic drug: lancets  1 lancet by NOT APPLICABLE route once daily.     allopurinoL 300 MG tablet  Commonly known as: ZYLOPRIM  Take 1.5 tablets (450 mg total) by mouth once daily.     amitriptyline 25 MG tablet  Commonly known as: ELAVIL  Take 2 tablets (50 mg total) by mouth every evening.     amLODIPine 5 MG tablet  Commonly known as: NORVASC  Take 5 mg by mouth once daily.     amoxicillin-clavulanate 875-125mg 875-125 mg per tablet  Commonly known as: AUGMENTIN  Take 1 tablet by mouth every 12 (twelve) hours.     aspirin 81 MG EC tablet  Commonly known as: ECOTRIN  Take 81 mg by mouth once daily.     BD ALCOHOL SWABS Padm  Generic drug: alcohol swabs     blood sugar  diagnostic Strp  Commonly known as: ONETOUCH ULTRA TEST  USE ONE STRIP TO CHECK GLUCOSE EVERY DAY     brimonidine 0.2% 0.2 % Drop  Commonly known as: ALPHAGAN  INSTILL 1 DROP INTO EACH EYE TWICE DAILY     carvediloL 12.5 MG tablet  Commonly known as: COREG  Take 12.5 mg by mouth 2 (two) times daily.     clopidogreL 75 mg tablet  Commonly known as: PLAVIX  once daily.     escitalopram oxalate 10 MG tablet  Commonly known as: LEXAPRO  Take 10 mg by mouth once daily.     * gabapentin 600 MG tablet  Commonly known as: NEURONTIN  Take 1 tablet (600 mg total) by mouth 2 (two) times daily.     * gabapentin 300 MG capsule  Commonly known as: NEURONTIN  Take 2 capsules (600 mg total) by mouth 2 (two) times daily.     glimepiride 4 MG tablet  Commonly known as: AMARYL  Take 1 tablet (4 mg total) by mouth daily with breakfast.     loperamide 2 mg Tab  Commonly known as: IMODIUM A-D  Take 2 mg by mouth once daily.     magnesium oxide 400 mg (241.3 mg magnesium) tablet  Commonly known as: MAG-OX  Take 400 mg by mouth 2 (two) times daily. Take 2 tablets (800 mg) two times a day.     mupirocin 2 % ointment  Commonly known as: BACTROBAN  Apply topically 2 (two) times daily.     omeprazole 40 MG capsule  Commonly known as: PRILOSEC  Take 1 capsule (40 mg total) by mouth once daily.     oxyCODONE 5 MG immediate release tablet  Commonly known as: ROXICODONE  Take 1 tablet (5 mg total) by mouth every 6 (six) hours as needed for Pain.     rosuvastatin 10 MG tablet  Commonly known as: CRESTOR  Take 1 tablet (10 mg total) by mouth every evening.     simethicone 125 mg Cap capsule  Commonly known as: MYLICON  Take 1 capsule (125 mg total) by mouth 4 (four) times daily as needed for Flatulence (gas).     tamsulosin 0.4 mg Cap  Commonly known as: FLOMAX  Take 1 capsule (0.4 mg total) by mouth once daily.     VITAMIN D ORAL  Take by mouth.         * This list has 2 medication(s) that are the same as other medications prescribed for you. Read  the directions carefully, and ask your doctor or other care provider to review them with you.            STOP taking these medications    dicyclomine 10 MG capsule  Commonly known as: BENTYL     ibuprofen 800 MG tablet  Commonly known as: ADVIL,MOTRIN     PAIN RELIEF EXTRA STRENGTH 500 MG tablet  Generic drug: acetaminophen          Time spent on the discharge of patient: 60 minutes    Fatuma Zapien MD  General Surgery  [facility]

## 2020-12-03 ENCOUNTER — TELEPHONE (OUTPATIENT)
Dept: RHEUMATOLOGY | Facility: CLINIC | Age: 70
End: 2020-12-03

## 2020-12-03 ENCOUNTER — TELEPHONE (OUTPATIENT)
Dept: SURGERY | Facility: CLINIC | Age: 70
End: 2020-12-03

## 2020-12-03 NOTE — TELEPHONE ENCOUNTER
----- Message from Shazia Burgess sent at 12/3/2020 10:49 AM CST -----  Regarding: appt access  Contact: Wm  Pt needs to be scheduled for hospital follow up next week. He is experiencing watery diarrhea and not feeling well. I attempted to schedule but first available appt was 1-8. Pt can be reached at [phone]

## 2020-12-03 NOTE — TELEPHONE ENCOUNTER
----- Message from Shankar Peterson sent at 12/3/2020 11:03 AM CST -----  Pt has been nolvia for the !2 22 20 would like an earlier  appt on  Dec 8 10 or 11 regular Dr corrine Parekh pt been     in hospital for 4 weeks     Please Call     Contact  [phone]

## 2020-12-03 NOTE — TELEPHONE ENCOUNTER
Called pt about the diarrhea. He said he had diarrhea  Yesterday and took a imodium. He is no longer having diarrhea.He said he is doing better. He just ate a sandwich and had a bottle of watter. No nausea or diarrhea. Moved his appt to next week.

## 2020-12-07 NOTE — PROGRESS NOTES
CRS Office Visit Postop    Referring Md:   No referring provider defined for this encounter.    SUBJECTIVE:     Chief Complaint: ileostomy problems    History of Present Illness:  The patient is an established patient to this practice.   Course is as follows:  Patient is a 70 y.o. male presents with ileostomy dysfunction  History of ulcerative colitis s/p total proctocolectomy with end ileostomy (1985)  He has required revisions of the ileostomy and ultimately it was transposed to the right side due to parastomal and incisional hernias.  He has had intermittent problems with syncope due to dehydration from the high output ostomy and takes imodium and Pedialyte as needed as well as daily Citrucel.  History also includes diabetes (on metformin) gout, CKD II and HTN.  He had a TURP in 1/2019 and is under surveillance with regular PSAs.     Admitted for bowel obstruction secondary to incarcerated peristomal hernia on 07/19/2019.  This was reduced in the ER with return of bowel function.     He was seen in follow-up multiple times regarding intermittent small-bowel obstruction as well as diarrhea requiring Imodium.  He elected to undergo revision of the ileostomy with repair of peristomal hernia.    11/9/2020:   1.  Open parastomal hernia repair with mesh underlay with phasix ST mesh in Sugar Spencer fashion  2.  Extensive lysis of adhesions greater than 3 hr    Postoperatively, his course was notable for delayed return of bowel function with prolonged postoperative ileus requiring NG tube placement and TPN.  He recovered slowly over a period of 2 weeks and was able to be discharged.    Current status:   12/7/20:  Improved.  Having regular ostomy output.  Ostomy output is thin.  He is not taking any Imodium.  As result, he is having dehydration.  Frequent tachycardia.  Also having dry mouth.  He is eating regular diet.  Incision is healing well.  No issues with pouching.    Review of Systems:  Review of Systems    Constitutional: Negative for chills, diaphoresis, fever, malaise/fatigue and weight loss.   HENT: Negative for congestion.    Respiratory: Negative for shortness of breath.    Cardiovascular: Negative for chest pain and leg swelling.   Gastrointestinal: Positive for abdominal pain and diarrhea. Negative for blood in stool, constipation, nausea and vomiting.   Genitourinary: Negative for dysuria.   Musculoskeletal: Negative for back pain and myalgias.   Skin: Negative for rash.   Neurological: Negative for dizziness, sensory change and weakness.   Endo/Heme/Allergies: Does not bruise/bleed easily.   Psychiatric/Behavioral: Negative for depression.       OBJECTIVE:     Vital Signs (Most Recent)  BP (!) 163/93 (BP Location: Left arm, Patient Position: Sitting, BP Method: Large (Automatic))   Pulse 105     Physical Exam:  General: White male in no distress   Neuro: alert and oriented x 4.  Moves all extremities.     HEENT: no icterus.  Trachea midline  Respiratory: respirations are even and unlabored  Cardiac: regular rate  Abdomen:  Midline laparotomy is well-healed.  Ileostomy in the right upper quadrant is pink and protrudes above the level of skin.  No hernia.  Extremities: Warm dry and intact  Skin: no rashes  Anorectal:  None    Labs: H/H = 12/38.  Cr = 1.4.  Albumin of 4.5    Imaging:    CT from 07/19/2019 personally reviewed and demonstrates parastomal hernia in the right lower quadrant colostomy showing small-bowel obstruction/strangulation with dilatation proximally to the loop and small bowel fecalization.    CT 09/25/2020:    - Postsurgical bowel changes with right lower ostomy noting nonobstructed fat and bowel containing parastomal hernia.    - Fluid distended loops of proximal bowel in the left abdomen with nearby small bowel feces sign could relate to component of oral contrast bolus with delayed transit.  Additional consideration to include sequela of early partial small bowel obstruction with  possible area of transition in the left upper abdomen.     Small-bowel follow-through 10/26/2020: Small duodenal diverticulum.  Otherwise, normal small bowel follow through evaluation without any evidence of delayed small bowel transit, obstruction, or stricture.    ASSESSMENT/PLAN:     Jarrett was seen today for post-op evaluation.    Diagnoses and all orders for this visit:    High output ileostomy    Other orders  -     sodium chloride 0.9% bolus 1,000 mL  -     sodium chloride 0.9% flush 10 mL        70-year-old gentleman with ulcerative colitis status post total procto colectomy and end ileostomy in 1985.  Since procedure, he has had issues with intermittent dehydration resulting in syncope secondary to high output.  This has been followed by intermittent small-bowel obstruction requiring hospitalization x 3.  He now has developed food fear secondary to chronic partial small-bowel obstructions.  Additionally, he has a moderate peristomal hernia that was complicated by 1 incarcerated event that spontaneously resolved.  He underwent exploratory laparotomy with extensive lysis of adhesions and sugar Spencer repair with a Phasix mesh underlay on 11/09/2020.  His postoperative course was complicated by delayed return of bowel function.  He is now healing well.  He does have current dehydration.  We will arrange for infusion today.  Therapy plan has been placed.  Infusion center has been notified.    I will see him back in 3 weeks.  I recommended that he start taking FiberCon once to twice per day to help thicken his bowel movements.  If that does not suffice, he can start Imodium.      CED Haley MD  Staff Surgeon  Colon & Rectal Surgery

## 2020-12-08 ENCOUNTER — INFUSION (OUTPATIENT)
Dept: INFECTIOUS DISEASES | Facility: HOSPITAL | Age: 70
End: 2020-12-08
Attending: INTERNAL MEDICINE
Payer: MEDICARE

## 2020-12-08 ENCOUNTER — OFFICE VISIT (OUTPATIENT)
Dept: OTOLARYNGOLOGY | Facility: CLINIC | Age: 70
End: 2020-12-08
Payer: MEDICARE

## 2020-12-08 ENCOUNTER — OFFICE VISIT (OUTPATIENT)
Dept: SURGERY | Facility: CLINIC | Age: 70
End: 2020-12-08
Payer: MEDICARE

## 2020-12-08 VITALS
WEIGHT: 200.63 LBS | RESPIRATION RATE: 22 BRPM | HEART RATE: 109 BPM | TEMPERATURE: 98 F | DIASTOLIC BLOOD PRESSURE: 92 MMHG | HEIGHT: 75 IN | SYSTOLIC BLOOD PRESSURE: 156 MMHG | OXYGEN SATURATION: 98 % | BODY MASS INDEX: 24.94 KG/M2

## 2020-12-08 VITALS
DIASTOLIC BLOOD PRESSURE: 99 MMHG | SYSTOLIC BLOOD PRESSURE: 143 MMHG | WEIGHT: 200.63 LBS | HEART RATE: 107 BPM | BODY MASS INDEX: 25.08 KG/M2

## 2020-12-08 VITALS — HEART RATE: 105 BPM | DIASTOLIC BLOOD PRESSURE: 93 MMHG | SYSTOLIC BLOOD PRESSURE: 163 MMHG

## 2020-12-08 DIAGNOSIS — Z03.818 ENCOUNTER FOR OBSERVATION FOR SUSPECTED EXPOSURE TO OTHER BIOLOGICAL AGENTS RULED OUT: ICD-10-CM

## 2020-12-08 DIAGNOSIS — E87.20 METABOLIC ACIDOSIS WITH NORMAL ANION GAP AND BICARBONATE LOSSES: ICD-10-CM

## 2020-12-08 DIAGNOSIS — R19.8 HIGH OUTPUT ILEOSTOMY: Primary | ICD-10-CM

## 2020-12-08 DIAGNOSIS — Z87.19 STATUS POST REPAIR OF VENTRAL HERNIA: Primary | ICD-10-CM

## 2020-12-08 DIAGNOSIS — Z93.2 HIGH OUTPUT ILEOSTOMY: Primary | ICD-10-CM

## 2020-12-08 DIAGNOSIS — Z98.890 STATUS POST REPAIR OF VENTRAL HERNIA: Primary | ICD-10-CM

## 2020-12-08 DIAGNOSIS — K11.8 PAROTID MASS: ICD-10-CM

## 2020-12-08 DIAGNOSIS — M54.2 NECK PAIN: ICD-10-CM

## 2020-12-08 DIAGNOSIS — R22.1 NODULE OF NECK: ICD-10-CM

## 2020-12-08 PROCEDURE — 1126F AMNT PAIN NOTED NONE PRSNT: CPT | Mod: S$GLB,,, | Performed by: OTOLARYNGOLOGY

## 2020-12-08 PROCEDURE — 3288F PR FALLS RISK ASSESSMENT DOCUMENTED: ICD-10-PCS | Mod: CPTII,S$GLB,, | Performed by: COLON & RECTAL SURGERY

## 2020-12-08 PROCEDURE — 1101F PR PT FALLS ASSESS DOC 0-1 FALLS W/OUT INJ PAST YR: ICD-10-PCS | Mod: CPTII,S$GLB,, | Performed by: COLON & RECTAL SURGERY

## 2020-12-08 PROCEDURE — 96360 HYDRATION IV INFUSION INIT: CPT

## 2020-12-08 PROCEDURE — 3080F PR MOST RECENT DIASTOLIC BLOOD PRESSURE >= 90 MM HG: ICD-10-PCS | Mod: CPTII,S$GLB,, | Performed by: OTOLARYNGOLOGY

## 2020-12-08 PROCEDURE — 1101F PT FALLS ASSESS-DOCD LE1/YR: CPT | Mod: CPTII,S$GLB,, | Performed by: COLON & RECTAL SURGERY

## 2020-12-08 PROCEDURE — 1101F PR PT FALLS ASSESS DOC 0-1 FALLS W/OUT INJ PAST YR: ICD-10-PCS | Mod: CPTII,S$GLB,, | Performed by: OTOLARYNGOLOGY

## 2020-12-08 PROCEDURE — 3288F FALL RISK ASSESSMENT DOCD: CPT | Mod: CPTII,S$GLB,, | Performed by: COLON & RECTAL SURGERY

## 2020-12-08 PROCEDURE — 3288F PR FALLS RISK ASSESSMENT DOCUMENTED: ICD-10-PCS | Mod: CPTII,S$GLB,, | Performed by: OTOLARYNGOLOGY

## 2020-12-08 PROCEDURE — 99024 POSTOP FOLLOW-UP VISIT: CPT | Mod: S$GLB,,, | Performed by: COLON & RECTAL SURGERY

## 2020-12-08 PROCEDURE — 3288F FALL RISK ASSESSMENT DOCD: CPT | Mod: CPTII,S$GLB,, | Performed by: OTOLARYNGOLOGY

## 2020-12-08 PROCEDURE — 3008F BODY MASS INDEX DOCD: CPT | Mod: CPTII,S$GLB,, | Performed by: OTOLARYNGOLOGY

## 2020-12-08 PROCEDURE — 3080F DIAST BP >= 90 MM HG: CPT | Mod: CPTII,S$GLB,, | Performed by: OTOLARYNGOLOGY

## 2020-12-08 PROCEDURE — 3008F PR BODY MASS INDEX (BMI) DOCUMENTED: ICD-10-PCS | Mod: CPTII,S$GLB,, | Performed by: OTOLARYNGOLOGY

## 2020-12-08 PROCEDURE — 3077F SYST BP >= 140 MM HG: CPT | Mod: CPTII,S$GLB,, | Performed by: OTOLARYNGOLOGY

## 2020-12-08 PROCEDURE — 99024 PR POST-OP FOLLOW-UP VISIT: ICD-10-PCS | Mod: S$GLB,,, | Performed by: COLON & RECTAL SURGERY

## 2020-12-08 PROCEDURE — 99204 PR OFFICE/OUTPT VISIT, NEW, LEVL IV, 45-59 MIN: ICD-10-PCS | Mod: S$GLB,,, | Performed by: OTOLARYNGOLOGY

## 2020-12-08 PROCEDURE — 1159F PR MEDICATION LIST DOCUMENTED IN MEDICAL RECORD: ICD-10-PCS | Mod: S$GLB,,, | Performed by: OTOLARYNGOLOGY

## 2020-12-08 PROCEDURE — 96361 HYDRATE IV INFUSION ADD-ON: CPT

## 2020-12-08 PROCEDURE — 99999 PR PBB SHADOW E&M-EST. PATIENT-LVL II: CPT | Mod: PBBFAC,,, | Performed by: COLON & RECTAL SURGERY

## 2020-12-08 PROCEDURE — 1101F PT FALLS ASSESS-DOCD LE1/YR: CPT | Mod: CPTII,S$GLB,, | Performed by: OTOLARYNGOLOGY

## 2020-12-08 PROCEDURE — 1126F PR PAIN SEVERITY QUANTIFIED, NO PAIN PRESENT: ICD-10-PCS | Mod: S$GLB,,, | Performed by: OTOLARYNGOLOGY

## 2020-12-08 PROCEDURE — 99999 PR PBB SHADOW E&M-EST. PATIENT-LVL IV: ICD-10-PCS | Mod: PBBFAC,,, | Performed by: OTOLARYNGOLOGY

## 2020-12-08 PROCEDURE — 99999 PR PBB SHADOW E&M-EST. PATIENT-LVL IV: CPT | Mod: PBBFAC,,, | Performed by: OTOLARYNGOLOGY

## 2020-12-08 PROCEDURE — 88173 PR  INTERPRETATION OF FNA SMEAR: ICD-10-PCS | Mod: 26,,, | Performed by: PATHOLOGY

## 2020-12-08 PROCEDURE — 3077F PR MOST RECENT SYSTOLIC BLOOD PRESSURE >= 140 MM HG: ICD-10-PCS | Mod: CPTII,S$GLB,, | Performed by: OTOLARYNGOLOGY

## 2020-12-08 PROCEDURE — 88173 CYTOPATH EVAL FNA REPORT: CPT | Performed by: PATHOLOGY

## 2020-12-08 PROCEDURE — 99999 PR PBB SHADOW E&M-EST. PATIENT-LVL II: ICD-10-PCS | Mod: PBBFAC,,, | Performed by: COLON & RECTAL SURGERY

## 2020-12-08 PROCEDURE — 99204 OFFICE O/P NEW MOD 45 MIN: CPT | Mod: S$GLB,,, | Performed by: OTOLARYNGOLOGY

## 2020-12-08 PROCEDURE — 1159F MED LIST DOCD IN RCRD: CPT | Mod: S$GLB,,, | Performed by: OTOLARYNGOLOGY

## 2020-12-08 PROCEDURE — 25000003 PHARM REV CODE 250: Performed by: COLON & RECTAL SURGERY

## 2020-12-08 PROCEDURE — 88173 CYTOPATH EVAL FNA REPORT: CPT | Mod: 26,,, | Performed by: PATHOLOGY

## 2020-12-08 RX ORDER — LIDOCAINE HYDROCHLORIDE 10 MG/ML
1 INJECTION, SOLUTION EPIDURAL; INFILTRATION; INTRACAUDAL; PERINEURAL ONCE
Status: CANCELLED | OUTPATIENT
Start: 2020-12-08 | End: 2020-12-08

## 2020-12-08 RX ORDER — SODIUM CHLORIDE 0.9 % (FLUSH) 0.9 %
10 SYRINGE (ML) INJECTION
Status: CANCELLED | OUTPATIENT
Start: 2020-12-08

## 2020-12-08 RX ADMIN — SODIUM CHLORIDE 1000 ML: 0.9 INJECTION, SOLUTION INTRAVENOUS at 03:12

## 2020-12-08 NOTE — H&P (VIEW-ONLY)
Chief Complaint   Patient presents with    Mass     parotid    Neck Pain       HPI   70 y.o. male presents for evaluation of a several month history of a left-sided parotid mass.  He reports that he 1st noticed this mass earlier this year.  He reports associated left lower lip weakness which interferes with his chewing.  He denies pain.  He feels this mass has diminished in size slightly over this period of time.  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    Review of Systems   Constitutional: Negative for fatigue and unexpected weight change.   HENT: Per HPI.  Eyes: Negative for visual disturbance.   Respiratory: Negative for shortness of breath, hemoptysis   Cardiovascular: Negative for chest pain and palpitations.   Musculoskeletal: Negative for decreased ROM, back pain.   Skin: Negative for rash, sunburn, itching.   Neurological: Negative for dizziness and seizures.   Hematological: Negative for adenopathy. Does not bruise/bleed easily.   Endocrine: Negative for rapid weight loss/weight gain, heat/cold intolerance.     Past Medical History   Patient Active Problem List   Diagnosis    Type 2 diabetes mellitus with stage 3 chronic kidney disease, without long-term current use of insulin    Essential hypertension    Chronic renal impairment    Idiopathic chronic gout of multiple sites with tophus    HLD (hyperlipidemia)    BPH (benign prostatic hyperplasia)    Disc disease, degenerative, cervical    GERD (gastroesophageal reflux disease)    History of ulcerative colitis    CKD (chronic kidney disease) stage 3, GFR 30-59 ml/min    Proteinuria    DDD (degenerative disc disease)    Generalized osteoarthritis    Elevated PSA    S/P TURP    CAD (coronary artery disease)    Presence of arterial stent    Abnormal LFTs    SBO (small bowel obstruction)    High output ileostomy    History of gout    History of elevated PSA    History of BPH     Bowel obstruction    Parastomal hernia with obstruction and without gangrene    Incisional hernia, without obstruction or gangrene    Hyperkalemia    Metabolic acidosis with normal anion gap and bicarbonate losses    Pleural plaque    Aortic atherosclerosis    Partial small bowel obstruction    Type 2 diabetes mellitus with diabetic polyneuropathy, without long-term current use of insulin    Ileostomy in place    Status post repair of ventral hernia    Parotid mass           Past Surgical History   Past Surgical History:   Procedure Laterality Date    cardiac stents      CATARACT EXTRACTION Bilateral     CERVICAL FUSION      colectomy and ileostomy  1985    LYSIS OF ADHESIONS N/A 11/9/2020    Procedure: LYSIS, ADHESIONS,  ERAS low;  Surgeon: CED Haley MD;  [facility];  Service: Colon and Rectal;  Laterality: N/A;    REPAIR, HERNIA, PARASTOMAL N/A 11/9/2020    Procedure: REPAIR, HERNIA, PARASTOMAL;  Surgeon: CED Haley MD;  [facility];  Service: Colon and Rectal;  Laterality: N/A;    TRANSURETHRAL RESECTION OF PROSTATE (TURP) WITHOUT USE OF LASER N/A 1/23/2019    Procedure: TURP, WITHOUT USING LASER BIPOLAR;  Surgeon: Catarino Mota MD;  [facility];  Service: Urology;  Laterality: N/A;  1.5 HOURS         Family History   Family History   Problem Relation Age of Onset    Cancer Father     Diabetes Father     Dementia Mother     Melanoma Neg Hx     Hypertension Neg Hx     Arthritis Neg Hx            Social History   .  Social History     Socioeconomic History    Marital status:      Spouse name: Not on file    Number of children: 1    Years of education: Not on file    Highest education level: Not on file   Occupational History    Occupation:  x 44 years     Comment: Retired    Occupation: [place]    Social Needs    Financial resource strain: Not on file    Food insecurity     Worry:  Not on file     Inability: Not on file    Transportation needs     Medical: Not on file     Non-medical: Not on file   Tobacco Use    Smoking status: Never Smoker    Smokeless tobacco: Never Used   Substance and Sexual Activity    Alcohol use: No    Drug use: No    Sexual activity: Not Currently     Partners: Female   Lifestyle    Physical activity     Days per week: Not on file     Minutes per session: Not on file    Stress: Not on file   Relationships    Social connections     Talks on phone: Not on file     Gets together: Not on file     Attends Scientologist service: Not on file     Active member of club or organization: Not on file     Attends meetings of clubs or organizations: Not on file     Relationship status: Not on file   Other Topics Concern    Not on file   Social History Narrative    Not on file         Allergies   Review of patient's allergies indicates:  No Known Allergies        Physical Exam     Vitals:    12/08/20 1358   BP: (!) 143/99   Pulse: 107         Body mass index is 25.08 kg/m².      General: AOx3, NAD   Respiratory:  Symmetric chest rise, normal effort  Right Ear: External Auditory Canal WNL,TM w/o masses/lesions/perforations.  Middle ear without evidence of effusion.   Left Ear:  Well-healed skin cancer excision scar left anterior superior helix External Auditory Canal WNL,TM w/o masses/lesions/perforations.  Middle ear without evidence of effusion.   Nose: No gross nasal septal deviation. Inferior Turbinates WNL bilaterally. No septal perforation. No masses/lesions.   Oral Cavity:  Oral Tongue mobile, no lesions noted. Hard Palate WNL. No buccal or FOM lesions.  Oropharynx:  No masses/lesions of the posterior pharyngeal wall. Tonsillar fossa without lesions. Soft palate without masses. Midline uvula.   Neck: No scars.  No cervical lymphadenopathy, thyromegaly or thyroid nodules.  Normal range of motion.  Roughly 2.5 cm firm, mobile left tail of parotid mass.  Face: House  Brackmann I bilaterally except for left marginal mandibular nerve weakness and subtle weakness of the left brow.    CT scan of the neck reviewed    Procedure in detail:  Ultrasound guided fine-needle aspiration of left parotid mass    Informed consent obtained.  The overlying skin was prepped with alcohol and injected with several cc of 1% lidocaine with epinephrine.  The lesion in question was visualized utilizing ultrasound.  Fine-needle aspiration was obtained utilizing a 25 gauge needle.  The specimen was processed and adequacy was confirmed by on-site cytopathology.  The patient tolerated the procedure well.        Assessment/Plan  Problem List Items Addressed This Visit        ENT    Parotid mass     Left parotid mass.  Etiology uncertain.  I am concerned for malignant process given his left facial weakness.  Fine-needle aspiration performed today.  I recommended that we proceed to the operating room for left parotidectomy and possible neck dissection.  Surgery is scheduled on December 18, 2020.    We then discussed the risks, benefits, indications, and alternatives to left parotidectomy.  The risks were noted to include, but not be limited to, infection, bleeding, scarring, partial or total weakness of one or all of the branches of the facial nerve, eye dryness and potential injury with nerve weakness, numbness of the ear and the side of the face, gustatory sweating (Fernanda's syndrome - which was described as sweating while eating due to cholinergic stimulation of sweat glands in the absence of salivary tissue), first bite syndrome (pain upon initial bite of food after dissection in the parapharyngeal space, tumor spillage, recurrence, collection of blood or tissue fluid, sialolcele formation requiring drainage, and the need for additional procedures.  The benefits in this case will be diagnostic and potentially therapeutic, and the indication is a parotid mass.  The chief alternative, in the absence of a  diagnosis, is observation or repeat needle biopsy.  Time was allowed for questions, and all questions were answered to the patient's apparent satisfaction.     We discussed the risks, benefits, and indications to left neck dissection. The risks were noted to include, but not be limited to, infection, bleeding, scarring, collection of blood or tissue fluid requiring drainage, weakness of the lip which may be temporary or permanent, weakness of the tongue which could be temporary or permanent and cause speech and/or swallowing difficulty, weakness of the shoulder which could be temporary or permanent, pain, chyle leakage which would require dietary modification and perhaps additional procedures, and the need for additional procedures. Time was allowed for questions, and all questions were answered to the patient's apparent satisfaction.                    Relevant Orders    Cytology-FNA Non-Radiology Clinician Performed w/o on site    Case Request Operating Room: EXCISION, PAROTID GLAND, DISSECTION, NECK (Completed)      Other Visit Diagnoses     Nodule of neck        Neck pain

## 2020-12-08 NOTE — ASSESSMENT & PLAN NOTE
Left parotid mass.  Etiology uncertain.  I am concerned for malignant process given his left facial weakness.  Fine-needle aspiration performed today.  I recommended that we proceed to the operating room for left parotidectomy and possible neck dissection.  Surgery is scheduled on December 18, 2020.    We then discussed the risks, benefits, indications, and alternatives to left parotidectomy.  The risks were noted to include, but not be limited to, infection, bleeding, scarring, partial or total weakness of one or all of the branches of the facial nerve, eye dryness and potential injury with nerve weakness, numbness of the ear and the side of the face, gustatory sweating (Fernanda's syndrome - which was described as sweating while eating due to cholinergic stimulation of sweat glands in the absence of salivary tissue), first bite syndrome (pain upon initial bite of food after dissection in the parapharyngeal space, tumor spillage, recurrence, collection of blood or tissue fluid, sialolcele formation requiring drainage, and the need for additional procedures.  The benefits in this case will be diagnostic and potentially therapeutic, and the indication is a parotid mass.  The chief alternative, in the absence of a diagnosis, is observation or repeat needle biopsy.  Time was allowed for questions, and all questions were answered to the patient's apparent satisfaction.     We discussed the risks, benefits, and indications to left neck dissection. The risks were noted to include, but not be limited to, infection, bleeding, scarring, collection of blood or tissue fluid requiring drainage, weakness of the lip which may be temporary or permanent, weakness of the tongue which could be temporary or permanent and cause speech and/or swallowing difficulty, weakness of the shoulder which could be temporary or permanent, pain, chyle leakage which would require dietary modification and perhaps additional procedures, and the need for  additional procedures. Time was allowed for questions, and all questions were answered to the patient's apparent satisfaction.

## 2020-12-08 NOTE — PROGRESS NOTES
Chief Complaint   Patient presents with    Mass     parotid    Neck Pain       HPI   70 y.o. male presents for evaluation of a several month history of a left-sided parotid mass.  He reports that he 1st noticed this mass earlier this year.  He reports associated left lower lip weakness which interferes with his chewing.  He denies pain.  He feels this mass has diminished in size slightly over this period of time.  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    Review of Systems   Constitutional: Negative for fatigue and unexpected weight change.   HENT: Per HPI.  Eyes: Negative for visual disturbance.   Respiratory: Negative for shortness of breath, hemoptysis   Cardiovascular: Negative for chest pain and palpitations.   Musculoskeletal: Negative for decreased ROM, back pain.   Skin: Negative for rash, sunburn, itching.   Neurological: Negative for dizziness and seizures.   Hematological: Negative for adenopathy. Does not bruise/bleed easily.   Endocrine: Negative for rapid weight loss/weight gain, heat/cold intolerance.     Past Medical History   Patient Active Problem List   Diagnosis    Type 2 diabetes mellitus with stage 3 chronic kidney disease, without long-term current use of insulin    Essential hypertension    Chronic renal impairment    Idiopathic chronic gout of multiple sites with tophus    HLD (hyperlipidemia)    BPH (benign prostatic hyperplasia)    Disc disease, degenerative, cervical    GERD (gastroesophageal reflux disease)    History of ulcerative colitis    CKD (chronic kidney disease) stage 3, GFR 30-59 ml/min    Proteinuria    DDD (degenerative disc disease)    Generalized osteoarthritis    Elevated PSA    S/P TURP    CAD (coronary artery disease)    Presence of arterial stent    Abnormal LFTs    SBO (small bowel obstruction)    High output ileostomy    History of gout    History of elevated PSA    History of BPH     Bowel obstruction    Parastomal hernia with obstruction and without gangrene    Incisional hernia, without obstruction or gangrene    Hyperkalemia    Metabolic acidosis with normal anion gap and bicarbonate losses    Pleural plaque    Aortic atherosclerosis    Partial small bowel obstruction    Type 2 diabetes mellitus with diabetic polyneuropathy, without long-term current use of insulin    Ileostomy in place    Status post repair of ventral hernia    Parotid mass           Past Surgical History   Past Surgical History:   Procedure Laterality Date    cardiac stents      CATARACT EXTRACTION Bilateral     CERVICAL FUSION      colectomy and ileostomy  1985    LYSIS OF ADHESIONS N/A 11/9/2020    Procedure: LYSIS, ADHESIONS,  ERAS low;  Surgeon: CED Haley MD;  [facility];  Service: Colon and Rectal;  Laterality: N/A;    REPAIR, HERNIA, PARASTOMAL N/A 11/9/2020    Procedure: REPAIR, HERNIA, PARASTOMAL;  Surgeon: CED Haley MD;  [facility];  Service: Colon and Rectal;  Laterality: N/A;    TRANSURETHRAL RESECTION OF PROSTATE (TURP) WITHOUT USE OF LASER N/A 1/23/2019    Procedure: TURP, WITHOUT USING LASER BIPOLAR;  Surgeon: Catarino Mota MD;  [facility];  Service: Urology;  Laterality: N/A;  1.5 HOURS         Family History   Family History   Problem Relation Age of Onset    Cancer Father     Diabetes Father     Dementia Mother     Melanoma Neg Hx     Hypertension Neg Hx     Arthritis Neg Hx            Social History   .  Social History     Socioeconomic History    Marital status:      Spouse name: Not on file    Number of children: 1    Years of education: Not on file    Highest education level: Not on file   Occupational History    Occupation:  x 44 years     Comment: Retired    Occupation: [place]    Social Needs    Financial resource strain: Not on file    Food insecurity     Worry:  Not on file     Inability: Not on file    Transportation needs     Medical: Not on file     Non-medical: Not on file   Tobacco Use    Smoking status: Never Smoker    Smokeless tobacco: Never Used   Substance and Sexual Activity    Alcohol use: No    Drug use: No    Sexual activity: Not Currently     Partners: Female   Lifestyle    Physical activity     Days per week: Not on file     Minutes per session: Not on file    Stress: Not on file   Relationships    Social connections     Talks on phone: Not on file     Gets together: Not on file     Attends Oriental orthodox service: Not on file     Active member of club or organization: Not on file     Attends meetings of clubs or organizations: Not on file     Relationship status: Not on file   Other Topics Concern    Not on file   Social History Narrative    Not on file         Allergies   Review of patient's allergies indicates:  No Known Allergies        Physical Exam     Vitals:    12/08/20 1358   BP: (!) 143/99   Pulse: 107         Body mass index is 25.08 kg/m².      General: AOx3, NAD   Respiratory:  Symmetric chest rise, normal effort  Right Ear: External Auditory Canal WNL,TM w/o masses/lesions/perforations.  Middle ear without evidence of effusion.   Left Ear:  Well-healed skin cancer excision scar left anterior superior helix External Auditory Canal WNL,TM w/o masses/lesions/perforations.  Middle ear without evidence of effusion.   Nose: No gross nasal septal deviation. Inferior Turbinates WNL bilaterally. No septal perforation. No masses/lesions.   Oral Cavity:  Oral Tongue mobile, no lesions noted. Hard Palate WNL. No buccal or FOM lesions.  Oropharynx:  No masses/lesions of the posterior pharyngeal wall. Tonsillar fossa without lesions. Soft palate without masses. Midline uvula.   Neck: No scars.  No cervical lymphadenopathy, thyromegaly or thyroid nodules.  Normal range of motion.  Roughly 2.5 cm firm, mobile left tail of parotid mass.  Face: House  Brackmann I bilaterally except for left marginal mandibular nerve weakness and subtle weakness of the left brow.    CT scan of the neck reviewed    Procedure in detail:  Ultrasound guided fine-needle aspiration of left parotid mass    Informed consent obtained.  The overlying skin was prepped with alcohol and injected with several cc of 1% lidocaine with epinephrine.  The lesion in question was visualized utilizing ultrasound.  Fine-needle aspiration was obtained utilizing a 25 gauge needle.  The specimen was processed and adequacy was confirmed by on-site cytopathology.  The patient tolerated the procedure well.        Assessment/Plan  Problem List Items Addressed This Visit        ENT    Parotid mass     Left parotid mass.  Etiology uncertain.  I am concerned for malignant process given his left facial weakness.  Fine-needle aspiration performed today.  I recommended that we proceed to the operating room for left parotidectomy and possible neck dissection.  Surgery is scheduled on December 18, 2020.    We then discussed the risks, benefits, indications, and alternatives to left parotidectomy.  The risks were noted to include, but not be limited to, infection, bleeding, scarring, partial or total weakness of one or all of the branches of the facial nerve, eye dryness and potential injury with nerve weakness, numbness of the ear and the side of the face, gustatory sweating (Fernanda's syndrome - which was described as sweating while eating due to cholinergic stimulation of sweat glands in the absence of salivary tissue), first bite syndrome (pain upon initial bite of food after dissection in the parapharyngeal space, tumor spillage, recurrence, collection of blood or tissue fluid, sialolcele formation requiring drainage, and the need for additional procedures.  The benefits in this case will be diagnostic and potentially therapeutic, and the indication is a parotid mass.  The chief alternative, in the absence of a  diagnosis, is observation or repeat needle biopsy.  Time was allowed for questions, and all questions were answered to the patient's apparent satisfaction.     We discussed the risks, benefits, and indications to left neck dissection. The risks were noted to include, but not be limited to, infection, bleeding, scarring, collection of blood or tissue fluid requiring drainage, weakness of the lip which may be temporary or permanent, weakness of the tongue which could be temporary or permanent and cause speech and/or swallowing difficulty, weakness of the shoulder which could be temporary or permanent, pain, chyle leakage which would require dietary modification and perhaps additional procedures, and the need for additional procedures. Time was allowed for questions, and all questions were answered to the patient's apparent satisfaction.                    Relevant Orders    Cytology-FNA Non-Radiology Clinician Performed w/o on site    Case Request Operating Room: EXCISION, PAROTID GLAND, DISSECTION, NECK (Completed)      Other Visit Diagnoses     Nodule of neck        Neck pain

## 2020-12-08 NOTE — LETTER
December 8, 2020      Merna Ghosh NP  [address] [facility] [address]           [id number]  [address]  [facility] [address]  Phone: [phone]  Fax: [phone]          Patient: Jarrett Charlton Jr.   MR Number: [id number]   YOB: 1950   Date of Visit: 12/8/2020       Dear Merna Ghosh:    Thank you for referring Jarrett Charlton to me for evaluation. Attached you will find relevant portions of my assessment and plan of care.    If you have questions, please do not hesitate to call me. I look forward to following Jarrett Charlton along with you.    Sincerely,    Michael Pinzon MD    Enclosure  CC:  No Recipients    If you would like to receive this communication electronically, please contact [email or web address] or [phone] to request more information on [facility] access.    For providers and/or their staff who would like to refer a patient to [facility], please contact us through our one-stop-shop provider referral line, [facility], at [phone].    If you feel you have received this communication in error or would no longer like to receive these types of communications, please e-mail [email or web address]

## 2020-12-08 NOTE — PROGRESS NOTES
Arrived for Normal saline Bolus, arrived late for infusion, pt informed that we will infuse as much of fluids as possible until clinic closure, pt verbalized understanding, tolerated without any difficulty

## 2020-12-09 ENCOUNTER — OFFICE VISIT (OUTPATIENT)
Dept: PRIMARY CARE CLINIC | Facility: CLINIC | Age: 70
End: 2020-12-09
Payer: MEDICARE

## 2020-12-09 VITALS
BODY MASS INDEX: 26.27 KG/M2 | DIASTOLIC BLOOD PRESSURE: 70 MMHG | HEART RATE: 97 BPM | HEIGHT: 74 IN | TEMPERATURE: 98 F | RESPIRATION RATE: 18 BRPM | OXYGEN SATURATION: 98 % | SYSTOLIC BLOOD PRESSURE: 112 MMHG | WEIGHT: 204.69 LBS

## 2020-12-09 DIAGNOSIS — N28.9 MILD RENAL INSUFFICIENCY: Primary | ICD-10-CM

## 2020-12-09 DIAGNOSIS — I10 ESSENTIAL HYPERTENSION: ICD-10-CM

## 2020-12-09 DIAGNOSIS — Z93.2 HIGH OUTPUT ILEOSTOMY: ICD-10-CM

## 2020-12-09 DIAGNOSIS — R19.8 HIGH OUTPUT ILEOSTOMY: ICD-10-CM

## 2020-12-09 DIAGNOSIS — F32.9 REACTIVE DEPRESSION: ICD-10-CM

## 2020-12-09 PROCEDURE — 1125F PR PAIN SEVERITY QUANTIFIED, PAIN PRESENT: ICD-10-PCS | Mod: S$GLB,,, | Performed by: INTERNAL MEDICINE

## 2020-12-09 PROCEDURE — 99213 OFFICE O/P EST LOW 20 MIN: CPT | Mod: S$GLB,,, | Performed by: INTERNAL MEDICINE

## 2020-12-09 PROCEDURE — 3074F PR MOST RECENT SYSTOLIC BLOOD PRESSURE < 130 MM HG: ICD-10-PCS | Mod: CPTII,S$GLB,, | Performed by: INTERNAL MEDICINE

## 2020-12-09 PROCEDURE — 3288F FALL RISK ASSESSMENT DOCD: CPT | Mod: CPTII,S$GLB,, | Performed by: INTERNAL MEDICINE

## 2020-12-09 PROCEDURE — 1125F AMNT PAIN NOTED PAIN PRSNT: CPT | Mod: S$GLB,,, | Performed by: INTERNAL MEDICINE

## 2020-12-09 PROCEDURE — 3008F BODY MASS INDEX DOCD: CPT | Mod: CPTII,S$GLB,, | Performed by: INTERNAL MEDICINE

## 2020-12-09 PROCEDURE — 99999 PR PBB SHADOW E&M-EST. PATIENT-LVL V: ICD-10-PCS | Mod: PBBFAC,,, | Performed by: INTERNAL MEDICINE

## 2020-12-09 PROCEDURE — 1101F PR PT FALLS ASSESS DOC 0-1 FALLS W/OUT INJ PAST YR: ICD-10-PCS | Mod: CPTII,S$GLB,, | Performed by: INTERNAL MEDICINE

## 2020-12-09 PROCEDURE — 1159F MED LIST DOCD IN RCRD: CPT | Mod: S$GLB,,, | Performed by: INTERNAL MEDICINE

## 2020-12-09 PROCEDURE — 99999 PR PBB SHADOW E&M-EST. PATIENT-LVL V: CPT | Mod: PBBFAC,,, | Performed by: INTERNAL MEDICINE

## 2020-12-09 PROCEDURE — 1159F PR MEDICATION LIST DOCUMENTED IN MEDICAL RECORD: ICD-10-PCS | Mod: S$GLB,,, | Performed by: INTERNAL MEDICINE

## 2020-12-09 PROCEDURE — 3078F PR MOST RECENT DIASTOLIC BLOOD PRESSURE < 80 MM HG: ICD-10-PCS | Mod: CPTII,S$GLB,, | Performed by: INTERNAL MEDICINE

## 2020-12-09 PROCEDURE — 99213 PR OFFICE/OUTPT VISIT, EST, LEVL III, 20-29 MIN: ICD-10-PCS | Mod: S$GLB,,, | Performed by: INTERNAL MEDICINE

## 2020-12-09 PROCEDURE — 1101F PT FALLS ASSESS-DOCD LE1/YR: CPT | Mod: CPTII,S$GLB,, | Performed by: INTERNAL MEDICINE

## 2020-12-09 PROCEDURE — 3074F SYST BP LT 130 MM HG: CPT | Mod: CPTII,S$GLB,, | Performed by: INTERNAL MEDICINE

## 2020-12-09 PROCEDURE — 3008F PR BODY MASS INDEX (BMI) DOCUMENTED: ICD-10-PCS | Mod: CPTII,S$GLB,, | Performed by: INTERNAL MEDICINE

## 2020-12-09 PROCEDURE — 3078F DIAST BP <80 MM HG: CPT | Mod: CPTII,S$GLB,, | Performed by: INTERNAL MEDICINE

## 2020-12-09 PROCEDURE — 3288F PR FALLS RISK ASSESSMENT DOCUMENTED: ICD-10-PCS | Mod: CPTII,S$GLB,, | Performed by: INTERNAL MEDICINE

## 2020-12-09 RX ORDER — DIPHENOXYLATE HYDROCHLORIDE AND ATROPINE SULFATE 2.5; .025 MG/1; MG/1
1 TABLET ORAL 3 TIMES DAILY PRN
Qty: 45 TABLET | Refills: 0 | Status: SHIPPED | OUTPATIENT
Start: 2020-12-09 | End: 2020-12-19

## 2020-12-09 NOTE — PROGRESS NOTES
Subjective:       Patient ID: Jarrett Charlton Jr. is a 70 y.o. male.    Chief Complaint: Follow-up    HPI  patient status post abdominal surgery for lysis of adhesion which has been causing recurrent abdominal obstruction patient complicated by abscess required 2nd laparoscopic surgery to drain abscess patient currently doing better and recovering from the surgery but is still having high output loose stool in the colostomy he had to reach see IV fluid for dehydration yesterday he try to control the diarrhea with Imodium with the some success and try to drink more 10 cage Gatorade Pedialyte patient deny fever nausea vomiting abdominal pain he has a mild renal insufficiency last him creatinine 1.4 his kidney function sometime deteriorate mostly due to dehydration from high output colostomy  Review of Systems    Objective:      Physical Exam  Vitals signs and nursing note reviewed.   Constitutional:       General: He is not in acute distress.     Appearance: He is well-developed.   HENT:      Head: Normocephalic and atraumatic.      Right Ear: External ear normal.      Left Ear: External ear normal.      Nose: Nose normal.      Mouth/Throat:      Pharynx: No oropharyngeal exudate.   Eyes:      General:         Right eye: No discharge.         Left eye: No discharge.      Conjunctiva/sclera: Conjunctivae normal.      Pupils: Pupils are equal, round, and reactive to light.   Neck:      Musculoskeletal: Normal range of motion and neck supple.      Thyroid: No thyromegaly.   Cardiovascular:      Rate and Rhythm: Normal rate and regular rhythm.      Heart sounds: Normal heart sounds. No murmur. No friction rub. No gallop.    Pulmonary:      Effort: Pulmonary effort is normal. No respiratory distress.      Breath sounds: Normal breath sounds. No wheezing or rales.   Abdominal:      General: Bowel sounds are normal. There is no distension.      Palpations: Abdomen is soft.      Tenderness: There is no abdominal tenderness.  There is no guarding.      Comments: A dry psi abdomen with colostomy with liquid yellow stool   Musculoskeletal: Normal range of motion.         General: No tenderness or deformity.   Lymphadenopathy:      Cervical: No cervical adenopathy.   Skin:     General: Skin is warm and dry.      Capillary Refill: Capillary refill takes less than 2 seconds.      Findings: No erythema or rash.   Neurological:      General: No focal deficit present.      Mental Status: He is alert and oriented to person, place, and time.   Psychiatric:         Mood and Affect: Mood normal.         Thought Content: Thought content normal.         Judgment: Judgment normal.         Assessment:       1. Mild renal insufficiency    2. High output ileostomy    3. Essential hypertension    4. Reactive depression        Plan:       Mild renal insufficiency  Comments:  Repeat blood test to re-evaluate in 1 week  Orders:  -     Basic Metabolic Panel; Future; Expected date: 12/16/2020    High output ileostomy  -     diphenoxylate-atropine 2.5-0.025 mg (LOMOTIL) 2.5-0.025 mg per tablet; Take 1 tablet by mouth 3 (three) times daily as needed for Diarrhea.  Dispense: 45 tablet; Refill: 0    Essential hypertension  Comments:  Stable on medication continue with treat    Reactive depression  Comments:  Patient denies suicidal thoughts ideation and currently does not want to take any medication or see psychiatrist        Medication List with Changes/Refills   New Medications    DIPHENOXYLATE-ATROPINE 2.5-0.025 MG (LOMOTIL) 2.5-0.025 MG PER TABLET    Take 1 tablet by mouth 3 (three) times daily as needed for Diarrhea.   Current Medications    ACCU-CHEK PAUL CONTROL SOLN SOLN    1 drop by NOT APPLICABLE route once daily.    ACCU-CHEK SOFTCLIX LANCETS MISC    1 lancet by NOT APPLICABLE route once daily.    ALLOPURINOL (ZYLOPRIM) 300 MG TABLET    Take 1.5 tablets (450 mg total) by mouth once daily.    AMITRIPTYLINE (ELAVIL) 25 MG TABLET    Take 2 tablets (50 mg  total) by mouth every evening.    AMLODIPINE (NORVASC) 5 MG TABLET    Take 5 mg by mouth once daily.    ASPIRIN (ECOTRIN) 81 MG EC TABLET    Take 81 mg by mouth once daily.      BD ALCOHOL SWABS PADM        BLOOD SUGAR DIAGNOSTIC (ONE TOUCH ULTRA TEST) [facility]    USE ONE STRIP TO CHECK GLUCOSE EVERY DAY    BRIMONIDINE 0.2% (ALPHAGAN) 0.2 % DROP    INSTILL 1 DROP INTO EACH EYE TWICE DAILY    CARVEDILOL (COREG) 12.5 MG TABLET    Take 12.5 mg by mouth 2 (two) times daily.     CLOPIDOGREL (PLAVIX) 75 MG TABLET    once daily.     ERGOCALCIFEROL, VITAMIN D2, (VITAMIN D ORAL)    Take by mouth.    ESCITALOPRAM OXALATE (LEXAPRO) 10 MG TABLET    Take 10 mg by mouth once daily.    GABAPENTIN (NEURONTIN) 300 MG CAPSULE    Take 2 capsules (600 mg total) by mouth 2 (two) times daily.    GABAPENTIN (NEURONTIN) 600 MG TABLET    Take 1 tablet (600 mg total) by mouth 2 (two) times daily.    GLIMEPIRIDE (AMARYL) 4 MG TABLET    Take 1 tablet (4 mg total) by mouth daily with breakfast.    LOPERAMIDE (IMODIUM A-D) 2 MG TAB    Take 2 mg by mouth once daily.    MAGNESIUM OXIDE (MAG-OX) 400 MG (241.3 MG MAGNESIUM) TABLET    Take 400 mg by mouth 2 (two) times daily. Take 2 tablets (800 mg) two times a day.    MUPIROCIN (BACTROBAN) 2 % OINTMENT    Apply topically 2 (two) times daily.    OMEPRAZOLE (PRILOSEC) 40 MG CAPSULE    Take 1 capsule (40 mg total) by mouth once daily.    ROSUVASTATIN (CRESTOR) 10 MG TABLET    Take 1 tablet (10 mg total) by mouth every evening.    SIMETHICONE (MYLICON) 125 MG CAP CAPSULE    Take 1 capsule (125 mg total) by mouth 4 (four) times daily as needed for Flatulence (gas).    TAMSULOSIN (FLOMAX) 0.4 MG CAP    Take 1 capsule (0.4 mg total) by mouth once daily.   Discontinued Medications    AMOXICILLIN-CLAVULANATE 875-125MG (AUGMENTIN) 875-125 MG PER TABLET    Take 1 tablet by mouth every 12 (twelve) hours.

## 2020-12-10 LAB — FINAL PATHOLOGIC DIAGNOSIS: NORMAL

## 2020-12-11 ENCOUNTER — TELEPHONE (OUTPATIENT)
Dept: PRIMARY CARE CLINIC | Facility: CLINIC | Age: 70
End: 2020-12-11

## 2020-12-11 ENCOUNTER — OFFICE VISIT (OUTPATIENT)
Dept: UROLOGY | Facility: CLINIC | Age: 70
End: 2020-12-11
Payer: MEDICARE

## 2020-12-11 ENCOUNTER — TELEPHONE (OUTPATIENT)
Dept: PREADMISSION TESTING | Facility: HOSPITAL | Age: 70
End: 2020-12-11

## 2020-12-11 VITALS
SYSTOLIC BLOOD PRESSURE: 118 MMHG | HEIGHT: 74 IN | DIASTOLIC BLOOD PRESSURE: 79 MMHG | HEART RATE: 110 BPM | BODY MASS INDEX: 25.36 KG/M2 | WEIGHT: 197.63 LBS

## 2020-12-11 DIAGNOSIS — C61 PROSTATE CANCER: Primary | ICD-10-CM

## 2020-12-11 DIAGNOSIS — F32.9 REACTIVE DEPRESSION: Primary | ICD-10-CM

## 2020-12-11 PROCEDURE — 1126F PR PAIN SEVERITY QUANTIFIED, NO PAIN PRESENT: ICD-10-PCS | Mod: S$GLB,ICN,, | Performed by: UROLOGY

## 2020-12-11 PROCEDURE — 3074F SYST BP LT 130 MM HG: CPT | Mod: CPTII,S$GLB,ICN, | Performed by: UROLOGY

## 2020-12-11 PROCEDURE — 3288F FALL RISK ASSESSMENT DOCD: CPT | Mod: CPTII,S$GLB,ICN, | Performed by: UROLOGY

## 2020-12-11 PROCEDURE — 1101F PT FALLS ASSESS-DOCD LE1/YR: CPT | Mod: CPTII,S$GLB,ICN, | Performed by: UROLOGY

## 2020-12-11 PROCEDURE — 99214 PR OFFICE/OUTPT VISIT, EST, LEVL IV, 30-39 MIN: ICD-10-PCS | Mod: 25,S$GLB,ICN, | Performed by: UROLOGY

## 2020-12-11 PROCEDURE — 1159F PR MEDICATION LIST DOCUMENTED IN MEDICAL RECORD: ICD-10-PCS | Mod: S$GLB,ICN,, | Performed by: UROLOGY

## 2020-12-11 PROCEDURE — 96402 CHEMO HORMON ANTINEOPL SQ/IM: CPT | Mod: S$GLB,ICN,, | Performed by: UROLOGY

## 2020-12-11 PROCEDURE — 3008F BODY MASS INDEX DOCD: CPT | Mod: CPTII,S$GLB,ICN, | Performed by: UROLOGY

## 2020-12-11 PROCEDURE — 1126F AMNT PAIN NOTED NONE PRSNT: CPT | Mod: S$GLB,ICN,, | Performed by: UROLOGY

## 2020-12-11 PROCEDURE — 3008F PR BODY MASS INDEX (BMI) DOCUMENTED: ICD-10-PCS | Mod: CPTII,S$GLB,ICN, | Performed by: UROLOGY

## 2020-12-11 PROCEDURE — 99214 OFFICE O/P EST MOD 30 MIN: CPT | Mod: 25,S$GLB,ICN, | Performed by: UROLOGY

## 2020-12-11 PROCEDURE — 1159F MED LIST DOCD IN RCRD: CPT | Mod: S$GLB,ICN,, | Performed by: UROLOGY

## 2020-12-11 PROCEDURE — 99999 PR PBB SHADOW E&M-EST. PATIENT-LVL III: ICD-10-PCS | Mod: PBBFAC,,, | Performed by: UROLOGY

## 2020-12-11 PROCEDURE — 1101F PR PT FALLS ASSESS DOC 0-1 FALLS W/OUT INJ PAST YR: ICD-10-PCS | Mod: CPTII,S$GLB,ICN, | Performed by: UROLOGY

## 2020-12-11 PROCEDURE — 3078F DIAST BP <80 MM HG: CPT | Mod: CPTII,S$GLB,ICN, | Performed by: UROLOGY

## 2020-12-11 PROCEDURE — 3078F PR MOST RECENT DIASTOLIC BLOOD PRESSURE < 80 MM HG: ICD-10-PCS | Mod: CPTII,S$GLB,ICN, | Performed by: UROLOGY

## 2020-12-11 PROCEDURE — 99999 PR PBB SHADOW E&M-EST. PATIENT-LVL III: CPT | Mod: PBBFAC,,, | Performed by: UROLOGY

## 2020-12-11 PROCEDURE — 96402 PR CHEMOTHER HORMON ANTINEOPL SUB-Q/IM: ICD-10-PCS | Mod: S$GLB,ICN,, | Performed by: UROLOGY

## 2020-12-11 PROCEDURE — 3288F PR FALLS RISK ASSESSMENT DOCUMENTED: ICD-10-PCS | Mod: CPTII,S$GLB,ICN, | Performed by: UROLOGY

## 2020-12-11 PROCEDURE — 3074F PR MOST RECENT SYSTOLIC BLOOD PRESSURE < 130 MM HG: ICD-10-PCS | Mod: CPTII,S$GLB,ICN, | Performed by: UROLOGY

## 2020-12-11 RX ORDER — CALCIUM CITRATE/VITAMIN D3 315MG-6.25
1 TABLET ORAL ONCE
Qty: 120 TABLET | Refills: 3 | Status: SHIPPED | OUTPATIENT
Start: 2020-12-11 | End: 2021-06-07

## 2020-12-11 NOTE — ANESTHESIA PAT ROS NOTE
"                                                                                                             12/11/2020  Jarrett Charlton Jr. is a 70 y.o., male.      Pre-op Assessment          Review of Systems  Anesthesia Hx:  No problems with previous Anesthesia SMALL MOUTH History of prior surgery of interest to airway management or planning: Previous anesthesia: General 11/9/20 hernia repair with general anesthesia.  Procedure performed at an [facility]. Denies Family Hx of Anesthesia complications.   Denies Personal Hx of Anesthesia complications.   Social:  Non-Smoker, No Alcohol Use    Hematology/Oncology:  Hematology Normal      Current/Recent Cancer. 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 S/P Percutaneous Coronary Intervention (PCI) coronary stent, unknown type, currently on clopidogrel (Plavix), currently on aspirin.  Hypertension , Fairly Controlled on RX, Rx Recently Decreased , Recent typical clinic B/P of 170/89    Renal/:  Kidney Function/Disease, Chronic Kidney Disease (CKD) , CKD Stage II (GFR 60-89)    Hepatic/GI:  Esophageal / Stomach Disorders Gerd Controlled by chronic antireflux medication.  Bowel Conditions:  Inflammatory Bowel Disease (pt has an ileostomy), Ulcerative Colitis past history, s/p surgical resection   Musculoskeletal:   Arthritis   Cervical Spine Disorder, Cervical Disc Disease, S/P Cervical Fusion ("minimal decrease in ROM", pt states was done years ago in "lower" cervical area), Cervical Spine Limited Mobility    Neurological:  Neurology Normal    Endocrine:  Diabetes, Type 2 Diabetes , controlled by oral hypoglycemics. Typical AM glucose range: 100 , most recent " HgA1c value was 6.4 on 9/26/20.    Psych:   depression          Physical Exam  General:  Well nourished    Airway/Jaw/Neck:  Airway Findings: (lower lip droops due to parotid mass) Mouth Opening: Small, but > 3cm Tongue: Normal  Jaw/Neck Findings:  Neck ROM: Extension Decreased, Mild  Neck Findings: (left parotid)  Abnormal Mass      Dental:  Dental Findings: In tact   Chest/Lungs:  Chest/Lungs Findings: Clear to auscultation, Normal Respiratory Rate     Heart/Vascular:  Heart Findings: Rate: Normal        Mental Status:  Mental Status Findings:  Cooperative, Alert and Oriented       12/15/20 T & S completed, Covid pending. Awaiting PCP final clearance note.   12/16/20 Cards clearance from Dr Dao to be scanned to media.  12/17/20 Dr Gonzalez PCP clearance note:  [Yesterday 4:22 PM] Madeleine Sercovich      Labs CXR reviewed normal ekg abnormal with noncritical conduction delay Pt is not symptomatic He is medically cleared for surgical removal left parotid mass under generalized anesthesia    Anesthesia Assessment: Preoperative EQUATION    Planned Procedure: Procedure(s) (LRB):  EXCISION, PAROTID GLAND (Left)  DISSECTION, NECK (Left)  Requested Anesthesia Type:General  Surgeon: Michael Pinzon MD  Service: ENT  Known or anticipated Date of Surgery:12/18/2020    Surgeon notes: reviewed    Electronic QUestionnaire Assessment completed via nurse interview with patient.        Triage considerations:     The patient has no apparent active cardiac condition (No unstable coronary Syndrome such as severe unstable angina or recent [<1 month] myocardial infarction, decompensated CHF, severe valvular   disease or significant arrhythmia)    Previous anesthesia records:GETA and No problems     Airway/Jaw/Neck:  Airway Findings: Mouth Opening: Normal Tongue: Normal  General Airway Assessment: Adult  Mallampati: II  TM Distance: Normal, at least 6 cm  Jaw/Neck Findings:  Micrognathia: Negative Neck ROM: Normal ROM        11/9/2020  Intubation:     Induction:  Intravenous    Intubated:  Postinduction    Mask Ventilation:  Easy with oral airway    Attempts:  1    Attempted By:  CRNA    Method of Intubation:  Direct    Blade:  Danae 4    Laryngeal View Grade: Grade IIA - cords partially seen      Difficult Airway Encountered?: No      Complications:  None    Airway Device:  Oral endotracheal tube    Airway Device Size:  7.5    Style/Cuff Inflation:  Cuffed    Inflation Amount (mL):  5    Tube secured:  22    Secured at:  The lips    Placement Verified By:  Capnometry    Complicating Factors:  None    Findings Post-Intubation:  BS equal bilateral    Last PCP note: within 1 month , within [facility]   Subspecialty notes: Dermatology, Endocrinology, ENT, Nephrology, Pulmonary, Rheumatology, Urology, CRS, GENERAL SURGERY    Other important co-morbidities: DM2, GERD, HLD, HTN and CAD, CKD III, PLEURAL PLAQUE, S/P TURP 1/2019, INTERMITTENT SYNCOPE RELATED TO DEHYDRATION      Tests already available:  Available tests,  within 3 months , within [facility] .  12/7/20: UA, CMP, CBC 11/30/20: MAG, PHOS, CMP, CBC, C REACTIVE PROTEIN 11/17/20: EKG 9/26/20: A1C            Instructions given. (See in Nurse's note)    Optimization:  Anesthesia Preop Clinic Assessment  Indicated - WILL SCHEDULE POC    Medical Opinion Indicated [facility] PCP       Sub-specialist consult indicated:   [facility] CARDIOLOGY WITH MEDICATION INSTRUCTIONS       Plan:    Testing:  T&S   Pre-anesthesia  visit       Visit focus: concerns in complex and/or prolonged anesthesia     Consultation:Patient's PCP for a statement of optimization  PATIENT'S CARDIOLOGY FOR STATEMENT OF OPTIMIZATION AND MEDICATION INSTRUCTIONS     Patient  has previously scheduled Medical Appointment: 12/11-UROLOGY, 12/15-CRS, COVID    Navigation: Tests Scheduled.              Consults scheduled.             Results will be tracked by Preop Clinic.    12/14: CARDIAC CLEARANCE FROM DR. COLUNGA AND 12/7 EKG RECEIVED.  "SCANNED TO MEDIA.  " CLEARED FOR SURGERY FROM A CARDIAC STANDPOINT @ MODERATE RISK DUE TO AGE AND RISK FACTORS, OKAY TO HOLD PLAVIX/ASA 5-7 DAYS PRIOR AND RESUME AS DIRECTED BY SURGEON."  "

## 2020-12-11 NOTE — PROGRESS NOTES
CC: elevated PSA, suspected prostate cancer    Jarrett Charlton Jr. is a 70 y.o. man who is here for Other (Trelstar injection)    Jarrett Charlton Jr. is a 70 y.o. man who is here for the evaluation of elevated PSA.  He had Lupron injection treatment for suspected prostate cancer with rising PSA      a history of elevated PSA, negative TUR biopsy in January. History of APR for crohn's disease.  MRI- 15 ccs. PI-RADS 4, 1 cm lesion, right base PZ. Negative extra prostatic extension,NVB,SV nodes.     Hx of no rectum as well as concerning PIRADS lesion on MRI.   Had TURP for biopsying suspicious area      Date: 01/23/2019  Procedure(s) Performed:   TURP  Findings:   Open bladder neck some regrowth of adenoma and suspicious area in right mid prostate   Right side of prostate resected and sent for pathology  SPECIMEN  1) Right prostate chips.  FINAL PATHOLOGIC DIAGNOSIS  Right prostate gland, transurethral resection:  - Benign prostatic tissue with nodular stromal hyperplasia  - Negative for malignancy     Since TURP, he noted that he can void better with better urine flow.  Blood in urine resolved.  However, he reports Occasional ZEB but it got all better.  No more ZEB reported.  He is here with another MRI of prostate following TURP because of still rising PSA up to 6.3 from 4.1.     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  He was not interested in SUDS.  S/p colon surgery and his anus is closed.  Therefore dong TRUS bx of prostate is not feasible.  After discussion, we decided to try finasteride to see whether it will lower his PSA.   He has been on finasteride for the past couple of years. Following TURP in 1/2019, we decided to stop taking finasteride.     When he was considered to undergo TURP, we could not do his surgery because he was not able to stop ASA or Plavix due to fresh vascular stent.  He got a clearance that he  can hold off plavix and ASA prior to TURP.  Hx of ulcerative colitis, had colon surgery back in 1985 and his anus was closed.  Has a neuropathy on the right leg.  Hx of sciatic nerve on both sides and received injection on the back.  Hx of diabetes diagnosed 2 years ago.  No family hx of prostate cancer.  He is a .  Denies flank pain, dysuira, hematuria .       MRI of prostate 8/20/19  Previous biopsy: Negative on 01/23/2019  PSA: 6.3 ng/mL (08/16/2019)  Prior therapy: TURP    Prostate: 3.6 x 3.5 x 3.0 cm corresponding to a computed volume of 15.2 cc.    Peripheral zone: Focal abnormalities with imaging features concerning for prostate cancer.  Lesion (MARYCARMEN) #1  Location: Side: right; Region: base; Zone: posterior peripheral zone laterally  Greatest dimension: 1.0 cm  T2-WI: T2 SI: circumscribed, homogeneous moderate hypointensity--score 4 or 5  DWI/ADC: DWI: Focal markedly hypointense on ADC and markedly hyperintense on high b-value DWI--score 4 or 5  DCE: Positive  Extraprostatic extension: No gross extraprostatic extension noting postsurgical changes limits evaluation.    PI-RADS assessment category: 4    Transitional zone: Mostly surgically resected.  Neurovascular bundle: Normal.  Seminal vesicles:  SV invasion: None  Adjacent Organ Involvement: No focal bladder wall thickening.  No rectal involvement.  Lymphadenopathy: None     On 11/22/19, pt underwent perineal biopsy by IR for CT guided needle bx for the right posterolateral prostate lesion;  Elevated prostate specific antigen (PSA)  Pathology:  BIOPSY OF PROSTATE BED:  FIBROFATTY TISSUE WITH NO NEOPLASIA IDENTIFIED     Pt had his PSA repeated and is now elevated to 7.9.  He is anxious about possible prostate cancer and would like to to something about it.    Past Medical History:   Diagnosis Date    Basal cell carcinoma     BPH (benign prostatic hyperplasia)     s/p TURP    Carotid stenosis     Chronic kidney disease     Claudication      Coronary artery disease     DDD (degenerative disc disease) 10/21/2013    Diabetes mellitus with renal complications     Disc disease, degenerative, cervical     Encounter for blood transfusion     GERD (gastroesophageal reflux disease)     Gout, chronic     History of ulcerative colitis     s/p colectomy and ileostomy    HLD (hyperlipidemia)     HTN (hypertension)     Ileostomy in place 1982    RBBB     Squamous cell carcinoma 03/08/2018    Left superior helix near insertion    Squamous cell carcinoma 04/12/2018    Left forearm x 5    Ventricular tachycardia      Past Surgical History:   Procedure Laterality Date    cardiac stents      CATARACT EXTRACTION Bilateral     CERVICAL FUSION      colectomy and ileostomy  1985    LYSIS OF ADHESIONS N/A 11/9/2020    Procedure: LYSIS, ADHESIONS,  ERAS low;  Surgeon: CED Haley MD;  Location: [facility] OR 2ND FLR;  Service: Colon and Rectal;  Laterality: N/A;    REPAIR, HERNIA, PARASTOMAL N/A 11/9/2020    Procedure: REPAIR, HERNIA, PARASTOMAL;  Surgeon: CED Haley MD;  Location: [facility] OR 2ND FLR;  Service: Colon and Rectal;  Laterality: N/A;    TRANSURETHRAL RESECTION OF PROSTATE (TURP) WITHOUT USE OF LASER N/A 1/23/2019    Procedure: TURP, WITHOUT USING LASER BIPOLAR;  Surgeon: Catarino Mota MD;  Location: [facility] OR 1ST FLR;  Service: Urology;  Laterality: N/A;  1.5 HOURS     Social History     Tobacco Use    Smoking status: Never Smoker    Smokeless tobacco: Never Used   Substance Use Topics    Alcohol use: No    Drug use: No     Family History   Problem Relation Age of Onset    Cancer Father     Diabetes Father     Dementia Mother     Melanoma Neg Hx     Hypertension Neg Hx     Arthritis Neg Hx      Allergy:  Review of patient's allergies indicates:  No Known Allergies  Outpatient Encounter Medications as of 12/11/2020   Medication Sig Dispense Refill    ACCU-CHEK PAUL CONTROL SOLN Soln 1 drop by NOT APPLICABLE route once  daily.      ACCU-CHEK SOFTCLIX LANCETS Misc 1 lancet by NOT APPLICABLE route once daily.      allopurinoL (ZYLOPRIM) 300 MG tablet Take 1.5 tablets (450 mg total) by mouth once daily. 135 tablet 3    amitriptyline (ELAVIL) 25 MG tablet Take 2 tablets (50 mg total) by mouth every evening. 30 tablet 1    amLODIPine (NORVASC) 5 MG tablet Take 5 mg by mouth once daily.      aspirin (ECOTRIN) 81 MG EC tablet Take 81 mg by mouth once daily.        BD ALCOHOL SWABS PadM       blood sugar diagnostic (ONE TOUCH ULTRA TEST) [facility] USE ONE STRIP TO CHECK GLUCOSE EVERY  each 11    brimonidine 0.2% (ALPHAGAN) 0.2 % Drop INSTILL 1 DROP INTO EACH EYE TWICE DAILY  4    carvediloL (COREG) 12.5 MG tablet Take 12.5 mg by mouth 2 (two) times daily.       clopidogrel (PLAVIX) 75 mg tablet once daily.       diphenoxylate-atropine 2.5-0.025 mg (LOMOTIL) 2.5-0.025 mg per tablet Take 1 tablet by mouth 3 (three) times daily as needed for Diarrhea. 45 tablet 0    ergocalciferol, vitamin D2, (VITAMIN D ORAL) Take by mouth.      escitalopram oxalate (LEXAPRO) 10 MG tablet Take 10 mg by mouth once daily.      gabapentin (NEURONTIN) 300 MG capsule Take 2 capsules (600 mg total) by mouth 2 (two) times daily. 120 capsule 1    gabapentin (NEURONTIN) 600 MG tablet Take 1 tablet (600 mg total) by mouth 2 (two) times daily. 180 tablet 3    glimepiride (AMARYL) 4 MG tablet Take 1 tablet (4 mg total) by mouth daily with breakfast. 90 tablet 1    loperamide (IMODIUM A-D) 2 mg Tab Take 2 mg by mouth once daily.      magnesium oxide (MAG-OX) 400 mg (241.3 mg magnesium) tablet Take 400 mg by mouth 2 (two) times daily. Take 2 tablets (800 mg) two times a day.      mupirocin (BACTROBAN) 2 % ointment Apply topically 2 (two) times daily. 22 g 0    omeprazole (PRILOSEC) 40 MG capsule Take 1 capsule (40 mg total) by mouth once daily. 90 capsule 3    rosuvastatin (CRESTOR) 10 MG tablet Take 1 tablet (10 mg total) by mouth every evening. 10  tablet 0    simethicone (MYLICON) 125 mg Cap capsule Take 1 capsule (125 mg total) by mouth 4 (four) times daily as needed for Flatulence (gas). 60 capsule 2    tamsulosin (FLOMAX) 0.4 mg Cap Take 1 capsule (0.4 mg total) by mouth once daily. 30 capsule 11    calcium citrate-vitamin D3 (CITRACAL + D MAXIMUM) 315 mg-6.25 mcg (250 unit) Tab Take 1 tablet by mouth once. for 1 dose 120 tablet 3     Facility-Administered Encounter Medications as of 12/11/2020   Medication Dose Route Frequency Provider Last Rate Last Dose    leuprolide (6 month) injection 45 mg  45 mg Intramuscular Q6 Months Catarino Mota MD        leuprolide (6 month) injection 45 mg  45 mg Intramuscular Q6 Months Catarino Mota MD   45 mg at 05/19/20 1112    leuprolide (6 month) injection 45 mg  45 mg Intramuscular Q6 Months Catarino Mota MD        triptorelin pamoate SusR 22.5 mg  22.5 mg Intramuscular 1 time in Clinic/HOD Catarino Mota MD        triptorelin pamoate SusR   Intramuscular 1 time in Clinic/HOD Catarino Mota MD         Review of Systems   ROS  Physical Exam     Vitals:    12/11/20 1053   BP: 118/79   Pulse: 110     Physical Exam  Genitalia:  Scrotum: no rash or lesion  Normal symmetric epididymis without masses  Normal vas palpated  Normal size, symmetric testicles with no masses   Normal urethral meatus with no discharge  Normal circumcised penis with no lesion   Rectal:  Normal perineum and anus upon inspection.  Normal tone, no masses or tenderness;     LABS:  Lab Results   Component Value Date    PSA 0.18 11/19/2020    PSA 7.9 (H) 04/20/2020    PSA 2.1 09/15/2014    PSA 2.16 05/13/2013    PSA 1.2 03/19/2012    PSA 1.0 02/19/2010    PSA 1.4 02/17/2009    PSA 1.1 04/30/2007    PSA 1.0 05/12/2006    PSA 1.0 04/22/2005    PSADIAG 0.54 08/11/2020    PSADIAG 6.3 (H) 08/16/2019    PSADIAG 4.1 (H) 11/21/2018    PSADIAG 3.3 06/15/2018    PSADIAG 3.1 05/08/2018    PSADIAG 1.7 06/05/2017    PSADIAG 1.4 06/06/2016    PSADIAG 2.1  10/16/2015    PSADIAG 4.3 (H) 06/19/2015     Results for orders placed or performed in visit on 08/11/20   Prostate Specific Antigen, Diagnostic   Result Value Ref Range    PSA Diagnostic 0.54 0.00 - 4.00 ng/mL   Results for orders placed or performed in visit on 08/16/19   Prostate Specific Antigen, Diagnostic   Result Value Ref Range    PSA Diagnostic 6.3 (H) 0.00 - 4.00 ng/mL   Results for orders placed or performed in visit on 11/21/18   Prostate Specific Antigen, Diagnostic   Result Value Ref Range    PSA Diagnostic 4.1 (H) 0.00 - 4.00 ng/mL     Lab Results   Component Value Date    CREATININE 1.4 12/07/2020    CREATININE 1.2 11/30/2020    CREATININE 1.0 11/29/2020     No results found for this or any previous visit.  Urine Culture, Routine   Date Value Ref Range Status   02/20/2019 No significant growth  Final     Hemoglobin A1C   Date Value Ref Range Status   09/26/2020 6.4 (H) 4.0 - 5.6 % Final     Comment:     ADA Screening Guidelines:  5.7-6.4%  Consistent with prediabetes  >or=6.5%  Consistent with diabetes  High levels of fetal hemoglobin interfere with the HbA1C  assay. Heterozygous hemoglobin variants (HbS, HgC, etc)do  not significantly interfere with this assay.   However, presence of multiple variants may affect accuracy.     04/20/2020 6.7 (H) 4.0 - 5.6 % Final     Comment:     ADA Screening Guidelines:  5.7-6.4%  Consistent with prediabetes  >or=6.5%  Consistent with diabetes  High levels of fetal hemoglobin interfere with the HbA1C  assay. Heterozygous hemoglobin variants (HbS, HgC, etc)do  not significantly interfere with this assay.   However, presence of multiple variants may affect accuracy.         Radiology:    Assessment and Plan:  Jarrett was seen today for other.    Diagnoses and all orders for this visit:    Prostate cancer  -     triptorelin pamoate SusR 22.5 mg  -     Prior authorization Order  -     calcium citrate-vitamin D3 (CITRACAL + D MAXIMUM) 315 mg-6.25 mcg (250 unit) Tab; Take  1 tablet by mouth once. for 1 dose  -     Prostate Specific Antigen, Diagnostic; Future    his PSA is markedly down since Lupron injection   He would like to continue it.  Will give Trelstar 22.5 mg today.  Will hold of ADT on next visit if his PSA remains low and stable.  Continue citracal with Vit D supplement.    I spent 25 minutes with the patient of which more than half was spent in direct consultation with the patient in regards to our treatment and plan.      Follow-up:  Follow up in about 6 months (around 6/11/2021) for PSA.

## 2020-12-11 NOTE — PATIENT INSTRUCTIONS
Lab Results   Component Value Date    PSA 0.18 11/19/2020    PSA 7.9 (H) 04/20/2020    PSA 2.1 09/15/2014    PSADIAG 0.54 08/11/2020    PSADIAG 6.3 (H) 08/16/2019    PSADIAG 4.1 (H) 11/21/2018

## 2020-12-11 NOTE — TELEPHONE ENCOUNTER
----- Message from Amy Pina RN sent at 12/11/2020 12:01 PM CST -----  Dr. Gonzalez,         This patient is scheduled for surgery (excision-parotid gland & neck dissection) on 12/18/2020 with Dr. Pinzon. This will last approximately 210 minutes under general anesthesia. Requesting medical optimization prior to this procedure. Please advise. Will await your response.                                        Thank you,                                                   Amy Pina RN BSN                                       [facility]

## 2020-12-11 NOTE — TELEPHONE ENCOUNTER
----- Message from Amy Pina RN sent at 12/11/2020 12:05 PM CST -----  12/18: poc/labs    Please schedule. thanks

## 2020-12-15 ENCOUNTER — HOSPITAL ENCOUNTER (OUTPATIENT)
Dept: PREADMISSION TESTING | Facility: HOSPITAL | Age: 70
Discharge: HOME | DRG: 139 | End: 2020-12-15
Attending: ANESTHESIOLOGY
Payer: MEDICARE

## 2020-12-15 ENCOUNTER — OFFICE VISIT (OUTPATIENT)
Dept: SURGERY | Facility: CLINIC | Age: 70
DRG: 139 | End: 2020-12-15
Payer: MEDICARE

## 2020-12-15 VITALS
BODY MASS INDEX: 26.01 KG/M2 | HEART RATE: 72 BPM | SYSTOLIC BLOOD PRESSURE: 150 MMHG | DIASTOLIC BLOOD PRESSURE: 108 MMHG | WEIGHT: 202.69 LBS | HEIGHT: 74 IN

## 2020-12-15 VITALS
WEIGHT: 201 LBS | HEIGHT: 74 IN | TEMPERATURE: 98 F | BODY MASS INDEX: 25.8 KG/M2 | OXYGEN SATURATION: 99 % | DIASTOLIC BLOOD PRESSURE: 80 MMHG | HEART RATE: 84 BPM | RESPIRATION RATE: 16 BRPM | SYSTOLIC BLOOD PRESSURE: 170 MMHG

## 2020-12-15 DIAGNOSIS — K56.51 INTESTINAL ADHESIONS WITH PARTIAL OBSTRUCTION: Primary | ICD-10-CM

## 2020-12-15 PROCEDURE — 1125F PR PAIN SEVERITY QUANTIFIED, PAIN PRESENT: ICD-10-PCS | Mod: S$GLB,,, | Performed by: COLON & RECTAL SURGERY

## 2020-12-15 PROCEDURE — 1101F PR PT FALLS ASSESS DOC 0-1 FALLS W/OUT INJ PAST YR: ICD-10-PCS | Mod: CPTII,S$GLB,, | Performed by: COLON & RECTAL SURGERY

## 2020-12-15 PROCEDURE — 99999 PR PBB SHADOW E&M-EST. PATIENT-LVL III: ICD-10-PCS | Mod: PBBFAC,,, | Performed by: COLON & RECTAL SURGERY

## 2020-12-15 PROCEDURE — 3008F PR BODY MASS INDEX (BMI) DOCUMENTED: ICD-10-PCS | Mod: CPTII,S$GLB,, | Performed by: COLON & RECTAL SURGERY

## 2020-12-15 PROCEDURE — 99999 PR PBB SHADOW E&M-EST. PATIENT-LVL III: CPT | Mod: PBBFAC,,, | Performed by: COLON & RECTAL SURGERY

## 2020-12-15 PROCEDURE — 1125F AMNT PAIN NOTED PAIN PRSNT: CPT | Mod: S$GLB,,, | Performed by: COLON & RECTAL SURGERY

## 2020-12-15 PROCEDURE — 1101F PT FALLS ASSESS-DOCD LE1/YR: CPT | Mod: CPTII,S$GLB,, | Performed by: COLON & RECTAL SURGERY

## 2020-12-15 PROCEDURE — 99024 PR POST-OP FOLLOW-UP VISIT: ICD-10-PCS | Mod: S$GLB,,, | Performed by: COLON & RECTAL SURGERY

## 2020-12-15 PROCEDURE — 3008F BODY MASS INDEX DOCD: CPT | Mod: CPTII,S$GLB,, | Performed by: COLON & RECTAL SURGERY

## 2020-12-15 PROCEDURE — 3288F PR FALLS RISK ASSESSMENT DOCUMENTED: ICD-10-PCS | Mod: CPTII,S$GLB,, | Performed by: COLON & RECTAL SURGERY

## 2020-12-15 PROCEDURE — 99024 POSTOP FOLLOW-UP VISIT: CPT | Mod: S$GLB,,, | Performed by: COLON & RECTAL SURGERY

## 2020-12-15 PROCEDURE — 3288F FALL RISK ASSESSMENT DOCD: CPT | Mod: CPTII,S$GLB,, | Performed by: COLON & RECTAL SURGERY

## 2020-12-15 NOTE — DISCHARGE INSTRUCTIONS
Your surgery has been scheduled for:__________________________________________    You should report to:  ____[facility], located on the [place] side of the first floor of the           [facility] ([phone])  ____[facility], located on the [facility] side of the            Second floor of the [facility] ([phone])  ____[facility] ([facility]) Located at [address]  Please Note   - Tell your doctor if you take Aspirin, products containing Aspirin, herbal medications  or blood thinners, such as Coumadin, Ticlid, or Plavix.  (Consult your provider regarding holding or stopping before surgery).  - Arrange for someone to drive you home following surgery.  You will not be allowed to leave the surgical facility alone or drive yourself home following sedation and anesthesia.  Before Surgery  - Stop taking all herbal medications 14days prior to surgery  - No Motrin/Advil (Ibuprofen) 7 days before surgery  - No Aleve (Naproxen) 7 days before surgery  - Stop Taking Aspirin, products containing Aspirin _____days before surgery  - Stop taking blood thinners_______days before surgery  - No Goody's/BC  Powder 7 days before surgery  - Refrain from drinking alcoholic beverages for 24hours before and after surgery  - Stop or limit smoking _________days before surgery  - You may take Tylenol for pain  Night before Surgery   Stop ALL solid food, gum, candy (including vitamins) 8 hours before arrival time.  (Please note: If your surgeon gives you different eating and drinking instructions, please follow surgeon's directions.)   Stop all CLOUDY liquids: coffee with creamer, formula, tube feeds, cloudy juices, non-human milk and breast milk with additives, 6 hours prior to arrival time.   Stop plain breast milk 4 hours prior to arrival time.   The patient should be ENCOURAGED to drink carbohydrate-rich clear liquids (sports  drinks, clear juices) until 2 hours prior to arrival time.   CLEAR liquids include only water, black coffee NO creamer, clear oral rehydration drinks, clear sports drinks or clear fruit juices (no orange juice, no pulpy juices, no apple cider). Advise patients if they can read newsprint through the liquid, it qualifies as clear liquid.    IF IN DOUBT, drink water instead.   - Take a shower or bath (shower is recommended).  Bathe with Hibiclens soap or an antibacterial soap from the neck down.  If not supplied by your surgeon, hibiclens soap will need to be purchased over the counter in pharmacy.  Rinse soap off thoroughly.  - Shampoo your hair with your regular shampoo  The Day of Surgery  · NOTHING TO  DRINK 2 hours before arrival time. If you are told to take medication on the morning of surgery, it may be taken with a sip of water.   - Take another bath or shower with hibiclens or any antibacterial soap, to reduce the chance of infection.  - Take heart and blood pressure medications with a small sip of water, as advised by the perioperative team.  - Do not take fluid pills  - You may brush your teeth and rinse your mouth, but do not swallow any additional water.   - Do not apply perfumes, powder, body lotions or deodorant on the day of surgery.  - Nail polish should be removed.  - Do not wear makeup or moisturizer  - Wear comfortable clothes, such as a button front shirt and loose fitting pants.  - Leave all jewelry, including body piercings, and valuables at home.    - Bring any devices you will neeed after surgery such as crutches or canes.  - If you have sleep apnea, please bring your CPAP machine  In the event that your physical condition changes including the onset of a cold or respiratory illness, or if you have to delay or cancel your surgery, please notify your surgeon.  Anesthesia: General Anesthesia     You are watched continuously during your procedure by your anesthesia provider.     Youre due to  have surgery. During surgery, youll be given medicine called anesthesia or anesthetic. This will keep you comfortable and pain-free. Your anesthesia provider will use general anesthesia.  What is general anesthesia?  General anesthesia puts you into a state like deep sleep. It goes into the bloodstream (IV anesthetics), into the lungs (gas anesthetics), or both. You feel nothing during the procedure. You will not remember it. During the procedure, the anesthesia provider monitors you continuously. He or she checks your heart rate and rhythm, blood pressure, breathing, and blood oxygen.  · IV anesthetics. IV anesthetics are given through an IV line in your arm. Theyre often given first. This is so you are asleep before a gas anesthetic is started. Some kinds of IV anesthetics relieve pain. Others relax you. Your doctor will decide which kind is best in your case.  · Gas anesthetics. Gas anesthetics are breathed into the lungs. They are often used to keep you asleep. They can be given through a facemask or a tube placed in your larynx or trachea (breathing tube).  ? If you have a facemask, your anesthesia provider will most likely place it over your nose and mouth while youre still awake. Youll breathe oxygen through the mask as your IV anesthetic is started. Gas anesthetic may be added through the mask.  ? If you have a tube in the larynx or trachea, it will be inserted into your throat after youre asleep.  Anesthesia tools and medicines  You will likely have:  · IV anesthetics. These are put into an IV line into your bloodstream.  · Gas anesthetics. You breathe these anesthetics into your lungs, where they pass into your bloodstream.  · Pulse oximeter. This is a small clip that is attached to the end of your finger. This measures your blood oxygen level.  · Electrocardiography leads (electrodes). These are small sticky pads that are placed on your chest. They record your heart rate and rhythm.  · Blood pressure  cuff. This reads your blood pressure.  Risks and possible complications  General anesthesia has some risks. These include:  · Breathing problems  · Nausea and vomiting  · Sore throat or hoarseness (usually temporary)  · Allergic reaction to the anesthetic  · Irregular heartbeat (rare)  · Cardiac arrest (rare)   Anesthesia safety  · Follow all instructions you are given for how long not to eat or drink before your procedure.  · Be sure your doctor knows what medicines and drugs you take. This includes over-the-counter medicines, herbs, supplements, alcohol or other drugs. You will be asked when those were last taken.  · Have an adult family member or friend drive you home after the procedure.  · For the first 24 hours after your surgery:  ? Do not drive or use heavy equipment.  ? Do not make important decisions or sign legal documents. If important decisions or signing legal documents is necessary during the first 24 hours after surgery, have a trusted family member or spouse act on your behalf.  ? Avoid alcohol.  ? Have a responsible adult stay with you. He or she can watch for problems and help keep you safe.  Date Last Reviewed: 12/1/2016 © 2000-2017 [employer]. [address] [place] [id number]. All rights reserved. This information is not intended as a substitute for professional medical care. Always follow your healthcare professional's instructions

## 2020-12-15 NOTE — PROGRESS NOTES
CRS Office Visit Postop    Referring Md:   No referring provider defined for this encounter.    SUBJECTIVE:     Chief Complaint: ileostomy problems    History of Present Illness:  The patient is an established patient to this practice.   Course is as follows:  Patient is a 70 y.o. male presents with ileostomy dysfunction  History of ulcerative colitis s/p total proctocolectomy with end ileostomy (1985)  He has required revisions of the ileostomy and ultimately it was transposed to the right side due to parastomal and incisional hernias.  He has had intermittent problems with syncope due to dehydration from the high output ostomy and takes imodium and Pedialyte as needed as well as daily Citrucel.  History also includes diabetes (on metformin) gout, CKD II and HTN.  He had a TURP in 1/2019 and is under surveillance with regular PSAs.     Admitted for bowel obstruction secondary to incarcerated peristomal hernia on 07/19/2019.  This was reduced in the ER with return of bowel function.     He was seen in follow-up multiple times regarding intermittent small-bowel obstruction as well as diarrhea requiring Imodium.  He elected to undergo revision of the ileostomy with repair of peristomal hernia.    11/9/2020:   1.  Open parastomal hernia repair with mesh underlay with phasix ST mesh in Sugar Spencer fashion  2.  Extensive lysis of adhesions greater than 3 hr    Postoperatively, his course was notable for delayed return of bowel function with prolonged postoperative ileus requiring NG tube placement and TPN.  He recovered slowly over a period of 2 weeks and was able to be discharged.    Current status:   12/7/20:  Improved.  Having regular ostomy output.  Ostomy output is thin.  He is not taking any Imodium.  As result, he is having dehydration.  Frequent tachycardia.  Also having dry mouth.  He is eating regular diet.  Incision is healing well.  No issues with pouching.  12/15/2020:  Feels better after getting IV fluids  "last week.  Started on Imodium.  Bowel movements are slightly thicker.  Complains of difficulty with gas.  Denies drinking carbonated beverages or using a straw.  No issues with his incision    Review of Systems:  Review of Systems   Constitutional: Negative for chills, diaphoresis, fever, malaise/fatigue and weight loss.   HENT: Negative for congestion.    Respiratory: Negative for shortness of breath.    Cardiovascular: Negative for chest pain and leg swelling.   Gastrointestinal: Positive for abdominal pain and diarrhea. Negative for blood in stool, constipation, nausea and vomiting.   Genitourinary: Negative for dysuria.   Musculoskeletal: Negative for back pain and myalgias.   Skin: Negative for rash.   Neurological: Negative for dizziness, sensory change and weakness.   Endo/Heme/Allergies: Does not bruise/bleed easily.   Psychiatric/Behavioral: Negative for depression.       OBJECTIVE:     Vital Signs (Most Recent)  BP (!) 150/108 (BP Location: Left arm, Patient Position: Sitting, BP Method: Large (Manual))   Pulse 72   Ht 6' 2" (1.88 m)   Wt 91.9 kg (202 lb 11.2 oz)   BMI 26.03 kg/m²     Physical Exam:  General: White male in no distress   Neuro: alert and oriented x 4.  Moves all extremities.     HEENT: no icterus.  Trachea midline  Respiratory: respirations are even and unlabored  Cardiac: regular rate  Abdomen:  Midline laparotomy is well-healed.  Ileostomy in the right upper quadrant is pink and protrudes above the level of skin.  No hernia.  Extremities: Warm dry and intact  Skin: no rashes  Anorectal:  None    Labs: H/H = 12/38.  Cr = 1.4.  Albumin of 4.5    Imaging:    CT from 07/19/2019 personally reviewed and demonstrates parastomal hernia in the right lower quadrant colostomy showing small-bowel obstruction/strangulation with dilatation proximally to the loop and small bowel fecalization.    CT 09/25/2020:    - Postsurgical bowel changes with right lower ostomy noting nonobstructed fat and " bowel containing parastomal hernia.    - Fluid distended loops of proximal bowel in the left abdomen with nearby small bowel feces sign could relate to component of oral contrast bolus with delayed transit.  Additional consideration to include sequela of early partial small bowel obstruction with possible area of transition in the left upper abdomen.     Small-bowel follow-through 10/26/2020: Small duodenal diverticulum.  Otherwise, normal small bowel follow through evaluation without any evidence of delayed small bowel transit, obstruction, or stricture.    ASSESSMENT/PLAN:     Jarrett was seen today for follow-up.    Diagnoses and all orders for this visit:    Intestinal adhesions with partial obstruction        70-year-old gentleman with ulcerative colitis status post total procto colectomy and end ileostomy in 1985.  Since procedure, he has had issues with intermittent dehydration resulting in syncope secondary to high output.  This has been followed by intermittent small-bowel obstruction requiring hospitalization x 3.  He now has developed food fear secondary to chronic partial small-bowel obstructions.  Additionally, he has a moderate peristomal hernia that was complicated by 1 incarcerated event that spontaneously resolved.  He underwent exploratory laparotomy with extensive lysis of adhesions and sugar Spencer repair with a Phasix mesh underlay on 11/09/2020.  His postoperative course was complicated by delayed return of bowel function.  He is now healing well.  Dehydration is improved from last week.  Tachycardia has resolved.  Encouraged him to continue to ramp up his Imodium.  He is taking Imodium liquid 10 mg 4 times per day.  Additionally asked for him to take simethicone to assist with gas.  I will see him back in 2 weeks.        CED Haley MD  Staff Surgeon  Colon & Rectal Surgery

## 2020-12-16 ENCOUNTER — TELEPHONE (OUTPATIENT)
Dept: PRIMARY CARE CLINIC | Facility: CLINIC | Age: 70
End: 2020-12-16

## 2020-12-16 DIAGNOSIS — R10.9 ABDOMINAL CRAMPING: Primary | ICD-10-CM

## 2020-12-16 RX ORDER — DICYCLOMINE HYDROCHLORIDE 20 MG/1
20 TABLET ORAL EVERY 6 HOURS PRN
Qty: 60 TABLET | Refills: 0 | Status: SHIPPED | OUTPATIENT
Start: 2020-12-16 | End: 2021-01-15

## 2020-12-16 NOTE — TELEPHONE ENCOUNTER
Pt. Notified rx sent to the pharmacy for Bentyl to help ease his abdominal cramping. Pt. States he is doing okay saw the anesthesiologist yesterday - completed his pre-op. He feels good, No fever, chills or SOB. Tested negative for Covid.

## 2020-12-16 NOTE — TELEPHONE ENCOUNTER
----- Message from Genoveva Heard sent at 12/16/2020  8:15 AM CST -----  Contact: PEGGY BELLA JR. [[id number]]@ [phone]  Would like to get medical advice.  Symptoms (please be specific):  Lower Stomach cramping . No other symptoms  How long has patient had these symptoms: started yesterday  Pharmacy name and phone # (copy from chart):  [facility] @   [phone]      Comments:  Would like you to call in an Rx to help with this.

## 2020-12-16 NOTE — TELEPHONE ENCOUNTER
Labs CXR reviewed normal ekg abnormal with noncritical conduction delay Pt is not symptomatic He is medically cleared for surgical removal left parotid mass under generalized anesthesia

## 2020-12-17 ENCOUNTER — ANESTHESIA EVENT (OUTPATIENT)
Dept: SURGERY | Facility: HOSPITAL | Age: 70
DRG: 139 | End: 2020-12-17
Payer: MEDICARE

## 2020-12-17 NOTE — ANESTHESIA PREPROCEDURE EVALUATION
12/17/2020  Jarrett Charlton Jr. is a 70 y.o., male.    Pre-operative evaluation for Procedure(s) (LRB):  EXCISION, PAROTID GLAND (Left)  DISSECTION, NECK (Left)    Jarrett Charlton Jr. is a 70 y.o. male w/ h/o T2DM, HTN, GERD, CKD, CAD s/p mLAD stent 2017 and a L parotid mass going for the above procedure.     Prev airway: Present Prior to Hospital Arrival?: No; Placement Date: 11/09/20; Placement Time: 1035 (created via procedure documentation); Method of Intubation: Direct laryngoscopy; Inserted by: CRNA; Airway Device: Endotracheal Tube; Mask Ventilation: Easy - oral; Intubated: Postinduction; Blade: Danae #4; Airway Device Size: 7.5; Style: Cuffed; Cuff Inflation: Minimal occlusive pressure; Inflation Amount: 5; Placement Verified By: Capnometry, ETT Condensation; Grade: Grade II; Complicating Factors: None; Intubation Findings: Bilateral breath sounds, Positive EtCO2, Atraumatic/Condition of teeth unchanged;  Depth of Insertion: 22; Securment: Lips; Complications: None; Breath Sounds: Equal Bilateral; Insertion Attempts: 1; Removal Date: 11/09/20;  Removal Time: 1627    Patient Active Problem List   Diagnosis    Type 2 diabetes mellitus with stage 3 chronic kidney disease, without long-term current use of insulin    Essential hypertension    Chronic renal impairment    Idiopathic chronic gout of multiple sites with tophus    HLD (hyperlipidemia)    BPH (benign prostatic hyperplasia)    Disc disease, degenerative, cervical    GERD (gastroesophageal reflux disease)    History of ulcerative colitis    CKD (chronic kidney disease) stage 3, GFR 30-59 ml/min    Proteinuria    DDD (degenerative disc disease)    Generalized osteoarthritis    Elevated PSA    S/P TURP    CAD (coronary artery disease)    Presence of arterial stent    Abnormal LFTs    SBO (small bowel obstruction)    High  output ileostomy    History of gout    History of elevated PSA    History of BPH    Bowel obstruction    Parastomal hernia with obstruction and without gangrene    Incisional hernia, without obstruction or gangrene    Hyperkalemia    Metabolic acidosis with normal anion gap and bicarbonate losses    Pleural plaque    Aortic atherosclerosis    Partial small bowel obstruction    Type 2 diabetes mellitus with diabetic polyneuropathy, without long-term current use of insulin    Ileostomy in place    Status post repair of ventral hernia    Parotid mass       Review of patient's allergies indicates:  No Known Allergies     Current Facility-Administered Medications on File Prior to Encounter   Medication Dose Route Frequency Provider Last Rate Last Dose    leuprolide (6 month) injection 45 mg  45 mg Intramuscular Q6 Months Catarino Mota MD        leuprolide (6 month) injection 45 mg  45 mg Intramuscular Q6 Months Catarino Mota MD   45 mg at 05/19/20 1112    leuprolide (6 month) injection 45 mg  45 mg Intramuscular Q6 Months Catarino Mota MD         Current Outpatient Medications on File Prior to Encounter   Medication Sig Dispense Refill    allopurinoL (ZYLOPRIM) 300 MG tablet Take 1.5 tablets (450 mg total) by mouth once daily. 135 tablet 3    amitriptyline (ELAVIL) 25 MG tablet Take 2 tablets (50 mg total) by mouth every evening. 30 tablet 1    amLODIPine (NORVASC) 5 MG tablet Take 5 mg by mouth once daily.      aspirin (ECOTRIN) 81 MG EC tablet Take 81 mg by mouth once daily.        carvediloL (COREG) 12.5 MG tablet Take 12.5 mg by mouth 2 (two) times daily.       clopidogrel (PLAVIX) 75 mg tablet once daily.       ergocalciferol, vitamin D2, (VITAMIN D ORAL) Take by mouth.      escitalopram oxalate (LEXAPRO) 10 MG tablet Take 10 mg by mouth once daily.      gabapentin (NEURONTIN) 300 MG capsule Take 2 capsules (600 mg total) by mouth 2 (two) times daily. 120 capsule 1    glimepiride  (AMARYL) 4 MG tablet Take 1 tablet (4 mg total) by mouth daily with breakfast. 90 tablet 1    magnesium oxide (MAG-OX) 400 mg (241.3 mg magnesium) tablet Take 400 mg by mouth 2 (two) times daily. Take 2 tablets (800 mg) two times a day.      omeprazole (PRILOSEC) 40 MG capsule Take 1 capsule (40 mg total) by mouth once daily. 90 capsule 3    rosuvastatin (CRESTOR) 10 MG tablet Take 1 tablet (10 mg total) by mouth every evening. 10 tablet 0    simethicone (MYLICON) 125 mg Cap capsule Take 1 capsule (125 mg total) by mouth 4 (four) times daily as needed for Flatulence (gas). 60 capsule 2    ACCU-CHEK PAUL CONTROL SOLN Soln 1 drop by NOT APPLICABLE route once daily.      ACCU-CHEK SOFTCLIX LANCETS Misc 1 lancet by NOT APPLICABLE route once daily.      BD ALCOHOL SWABS PadM       blood sugar diagnostic (ONE TOUCH ULTRA TEST) Strp USE ONE STRIP TO CHECK GLUCOSE EVERY  each 11    brimonidine 0.2% (ALPHAGAN) 0.2 % Drop INSTILL 1 DROP INTO EACH EYE TWICE DAILY  4    gabapentin (NEURONTIN) 600 MG tablet Take 1 tablet (600 mg total) by mouth 2 (two) times daily. 180 tablet 3    loperamide (IMODIUM A-D) 2 mg Tab Take 2 mg by mouth once daily.      mupirocin (BACTROBAN) 2 % ointment Apply topically 2 (two) times daily. 22 g 0    tamsulosin (FLOMAX) 0.4 mg Cap Take 1 capsule (0.4 mg total) by mouth once daily. 30 capsule 11       Past Surgical History:   Procedure Laterality Date    cardiac stents      CATARACT EXTRACTION Bilateral     CERVICAL FUSION      colectomy and ileostomy  1985    LYSIS OF ADHESIONS N/A 11/9/2020    Procedure: LYSIS, ADHESIONS,  ERAS low;  Surgeon: CED Haley MD;  [facility] 2ND FLR;  Service: Colon and Rectal;  Laterality: N/A;    REPAIR, HERNIA, PARASTOMAL N/A 11/9/2020    Procedure: REPAIR, HERNIA, PARASTOMAL;  Surgeon: CED Haley MD;  Location: [facility] OR 2ND FLR;  Service: Colon and Rectal;  Laterality: N/A;    TRANSURETHRAL RESECTION OF PROSTATE  (TURP) WITHOUT USE OF LASER N/A 1/23/2019    Procedure: TURP, WITHOUT USING LASER BIPOLAR;  Surgeon: Catarino Mota MD;  Location: [facility] OR [address] [place] [address];  Service: Urology;  Laterality: N/A;  1.5 HOURS       Social History     Socioeconomic History    Marital status:      Spouse name: Not on file    Number of children: 1    Years of education: Not on file    Highest education level: Not on file   Occupational History    Occupation:  x 44 years     Comment: Retired    Occupation: [place]    Social Needs    Financial resource strain: Not on file    Food insecurity     Worry: Not on file     Inability: Not on file    Transportation needs     Medical: Not on file     Non-medical: Not on file   Tobacco Use    Smoking status: Never Smoker    Smokeless tobacco: Never Used   Substance and Sexual Activity    Alcohol use: No    Drug use: No    Sexual activity: Not Currently     Partners: Female   Lifestyle    Physical activity     Days per week: Not on file     Minutes per session: Not on file    Stress: Not on file   Relationships    Social connections     Talks on phone: Not on file     Gets together: Not on file     Attends Christian service: Not on file     Active member of club or organization: Not on file     Attends meetings of clubs or organizations: Not on file     Relationship status: Not on file   Other Topics Concern    Not on file   Social History Narrative    Not on file         Vital Signs Range (Last 24H):         CBC: No results for input(s): WBC, RBC, HGB, HCT, PLT, MCV, MCH, MCHC in the last 72 hours.    CMP: No results for input(s): NA, K, CL, CO2, BUN, CREATININE, GLU, MG, PHOS, CALCIUM, ALBUMIN, PROT, ALKPHOS, ALT, AST, BILITOT in the last 72 hours.    INR  No results for input(s): PT, INR, PROTIME, APTT in the last 72 hours.        Diagnostic Studies:      EKG:  Vent. Rate : 098 BPM     Atrial Rate : 098 BPM      P-R Int : 156 ms          QRS Dur : 132 ms        QT Int : 404 ms       P-R-T Axes : 025 -68 003 degrees      QTc Int : 515 ms     Sinus rhythm with Premature atrial complexes with Aberrant conduction   Left axis deviation   Right bundle branch block   Inferior infarct (cited on or before 06-DEC-2018)   Abnormal ECG   When compared with ECG of 28-JUL-2020 12:39,   Aberrant conduction is now Present   Right bundle branch block has replaced Incomplete right bundle branch block     Confirmed by PAWEL CAMPOS MD ([id number]) on 11/18/2020 11:14:24 AM     2D Echo:  N/a        Anesthesia Evaluation    I have reviewed the Patient Summary Reports.     I have reviewed the Nursing Notes. I have reviewed the NPO Status.   I have reviewed the Medications.     Review of Systems  Anesthesia Hx:  No problems with previous Anesthesia SMALL MOUTH History of prior surgery of interest to airway management or planning: Previous anesthesia: General 11/9/20 hernia repair with general anesthesia. Procedure performed at an [facility]. Denies Family Hx of Anesthesia complications.   Denies Personal Hx of Anesthesia complications.   Social:  Non-Smoker, No Alcohol Use    Hematology/Oncology:  Hematology Normal      Current/Recent Cancer. 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 S/P Percutaneous Coronary Intervention (PCI) coronary stent, unknown type, currently on clopidogrel (Plavix), currently on aspirin.  Hypertension , Fairly Controlled on RX, Rx Recently Decreased , Recent typical clinic B/P of 170/89    Renal/:   Chronic Renal Disease  Kidney Function/Disease, Chronic Kidney Disease (CKD) , CKD Stage II (GFR 60-89)    Hepatic/GI:   GERD,  "poorly controlled  Esophageal / Stomach Disorders Gerd Controlled by chronic antireflux medication.  Bowel Conditions:  Inflammatory Bowel Disease (pt has an ileostomy), Ulcerative Colitis past history, s/p surgical resection   Musculoskeletal:   Arthritis   Cervical Spine Disorder, Cervical Disc Disease, S/P Cervical Fusion ("minimal decrease in ROM", pt states was done years ago in "lower" cervical area), Cervical Spine Limited Mobility    Neurological:  Neurology Normal    Endocrine:   Diabetes, well controlled, type 2  Diabetes, Type 2 Diabetes , controlled by oral hypoglycemics. Typical AM glucose range: 100 , most recent HgA1c value was 6.4 on 9/26/20.    Psych:   depression          Physical Exam  General:  Well nourished    Airway/Jaw/Neck:  Airway Findings: (lower lip droops due to parotid mass) Mouth Opening: Small, but > 3cm Tongue: Normal  General Airway Assessment: Adult  Mallampati: II  Jaw/Neck Findings:  Neck ROM: Extension Decreased, Mild  Neck Findings: (left parotid)  Abnormal Mass      Dental:  Dental Findings: Periodontal disease, Mild   Chest/Lungs:  Chest/Lungs Findings: Clear to auscultation, Normal Respiratory Rate     Heart/Vascular:  Heart Findings: Rate: Normal        Mental Status:  Mental Status Findings:  Cooperative, Alert and Oriented         Anesthesia Plan  Type of Anesthesia, risks & benefits discussed:  Anesthesia Type:  general  Patient's Preference:   Intra-op Monitoring Plan: standard ASA monitors  Intra-op Monitoring Plan Comments:   Post Op Pain Control Plan: multimodal analgesia and per primary service following discharge from PACU  Post Op Pain Control Plan Comments:   Induction:   IV  Beta Blocker:  Patient is on a Beta-Blocker and has received one dose within the past 24 hours (No further documentation required).       Informed Consent: Patient understands risks and agrees with Anesthesia plan.  Questions answered. Anesthesia consent signed with patient.  ASA Score: 3   "   Day of Surgery Review of History & Physical:    H&P update referred to the surgeon.         Ready For Surgery From Anesthesia Perspective.

## 2020-12-18 ENCOUNTER — ANESTHESIA (OUTPATIENT)
Dept: SURGERY | Facility: HOSPITAL | Age: 70
DRG: 139 | End: 2020-12-18
Payer: MEDICARE

## 2020-12-18 ENCOUNTER — HOSPITAL ENCOUNTER (INPATIENT)
Facility: HOSPITAL | Age: 70
LOS: 1 days | Discharge: HOME | DRG: 139 | End: 2020-12-19
Attending: OTOLARYNGOLOGY | Admitting: OTOLARYNGOLOGY
Payer: MEDICARE

## 2020-12-18 DIAGNOSIS — K11.8 PAROTID MASS: ICD-10-CM

## 2020-12-18 LAB
POCT GLUCOSE: 122 MG/DL (ref 70–110)
POCT GLUCOSE: 137 MG/DL (ref 70–110)
POCT GLUCOSE: 165 MG/DL (ref 70–110)
POCT GLUCOSE: 176 MG/DL (ref 70–110)

## 2020-12-18 PROCEDURE — 25000003 PHARM REV CODE 250: Performed by: STUDENT IN AN ORGANIZED HEALTH CARE EDUCATION/TRAINING PROGRAM

## 2020-12-18 PROCEDURE — 36000708 HC OR TIME LEV III 1ST 15 MIN: Performed by: OTOLARYNGOLOGY

## 2020-12-18 PROCEDURE — 42425 PR EXC PAROTD,TOTAL,SACRIFICE 5TH NERV: ICD-10-PCS | Mod: LT,,, | Performed by: OTOLARYNGOLOGY

## 2020-12-18 PROCEDURE — D9220A PRA ANESTHESIA: Mod: ,,, | Performed by: STUDENT IN AN ORGANIZED HEALTH CARE EDUCATION/TRAINING PROGRAM

## 2020-12-18 PROCEDURE — C1729 CATH, DRAINAGE: HCPCS | Performed by: OTOLARYNGOLOGY

## 2020-12-18 PROCEDURE — 63600175 PHARM REV CODE 636 W HCPCS: Performed by: STUDENT IN AN ORGANIZED HEALTH CARE EDUCATION/TRAINING PROGRAM

## 2020-12-18 PROCEDURE — 88307 TISSUE EXAM BY PATHOLOGIST: CPT | Performed by: PATHOLOGY

## 2020-12-18 PROCEDURE — 82962 GLUCOSE BLOOD TEST: CPT | Performed by: OTOLARYNGOLOGY

## 2020-12-18 PROCEDURE — 71000039 HC RECOVERY, EACH ADD'L HOUR: Performed by: OTOLARYNGOLOGY

## 2020-12-18 PROCEDURE — 42425 EXCISE PAROTID GLAND/LESION: CPT | Mod: LT,,, | Performed by: OTOLARYNGOLOGY

## 2020-12-18 PROCEDURE — 27201423 OPTIME MED/SURG SUP & DEVICES STERILE SUPPLY: Performed by: OTOLARYNGOLOGY

## 2020-12-18 PROCEDURE — 94761 N-INVAS EAR/PLS OXIMETRY MLT: CPT

## 2020-12-18 PROCEDURE — 88307 TISSUE EXAM BY PATHOLOGIST: CPT | Mod: 26,,, | Performed by: PATHOLOGY

## 2020-12-18 PROCEDURE — 88305 TISSUE EXAM BY PATHOLOGIST: ICD-10-PCS | Mod: 26,,, | Performed by: PATHOLOGY

## 2020-12-18 PROCEDURE — 63600175 PHARM REV CODE 636 W HCPCS: Performed by: OTOLARYNGOLOGY

## 2020-12-18 PROCEDURE — 37000009 HC ANESTHESIA EA ADD 15 MINS: Performed by: OTOLARYNGOLOGY

## 2020-12-18 PROCEDURE — 88305 TISSUE EXAM BY PATHOLOGIST: CPT | Mod: 26,,, | Performed by: PATHOLOGY

## 2020-12-18 PROCEDURE — 25000003 PHARM REV CODE 250: Performed by: OTOLARYNGOLOGY

## 2020-12-18 PROCEDURE — D9220A PRA ANESTHESIA: ICD-10-PCS | Mod: ,,, | Performed by: STUDENT IN AN ORGANIZED HEALTH CARE EDUCATION/TRAINING PROGRAM

## 2020-12-18 PROCEDURE — 71000033 HC RECOVERY, INTIAL HOUR: Performed by: OTOLARYNGOLOGY

## 2020-12-18 PROCEDURE — 11000001 HC ACUTE MED/SURG PRIVATE ROOM

## 2020-12-18 PROCEDURE — 71000016 HC POSTOP RECOV ADDL HR: Performed by: OTOLARYNGOLOGY

## 2020-12-18 PROCEDURE — 88331 PATH CONSLTJ SURG 1 BLK 1SPC: CPT | Mod: 26,,, | Performed by: PATHOLOGY

## 2020-12-18 PROCEDURE — 88305 TISSUE EXAM BY PATHOLOGIST: CPT | Performed by: PATHOLOGY

## 2020-12-18 PROCEDURE — 88331 PR  PATH CONSULT IN SURG,W FRZ SEC: ICD-10-PCS | Mod: 26,,, | Performed by: PATHOLOGY

## 2020-12-18 PROCEDURE — 88307 PR  SURG PATH,LEVEL V: ICD-10-PCS | Mod: 26,,, | Performed by: PATHOLOGY

## 2020-12-18 PROCEDURE — 88331 PATH CONSLTJ SURG 1 BLK 1SPC: CPT | Performed by: PATHOLOGY

## 2020-12-18 PROCEDURE — 71000015 HC POSTOP RECOV 1ST HR: Performed by: OTOLARYNGOLOGY

## 2020-12-18 PROCEDURE — 37000008 HC ANESTHESIA 1ST 15 MINUTES: Performed by: OTOLARYNGOLOGY

## 2020-12-18 PROCEDURE — 36000709 HC OR TIME LEV III EA ADD 15 MIN: Performed by: OTOLARYNGOLOGY

## 2020-12-18 RX ORDER — AMLODIPINE BESYLATE 5 MG/1
5 TABLET ORAL DAILY
Status: DISCONTINUED | OUTPATIENT
Start: 2020-12-18 | End: 2020-12-19 | Stop reason: HOSPADM

## 2020-12-18 RX ORDER — CEPHALEXIN 500 MG/1
500 CAPSULE ORAL EVERY 6 HOURS
Status: DISCONTINUED | OUTPATIENT
Start: 2020-12-18 | End: 2020-12-19 | Stop reason: HOSPADM

## 2020-12-18 RX ORDER — HYDROCODONE BITARTRATE AND ACETAMINOPHEN 5; 325 MG/1; MG/1
1 TABLET ORAL EVERY 4 HOURS PRN
Status: DISCONTINUED | OUTPATIENT
Start: 2020-12-18 | End: 2020-12-19 | Stop reason: HOSPADM

## 2020-12-18 RX ORDER — CARVEDILOL 12.5 MG/1
12.5 TABLET ORAL 2 TIMES DAILY
Status: DISCONTINUED | OUTPATIENT
Start: 2020-12-18 | End: 2020-12-19 | Stop reason: HOSPADM

## 2020-12-18 RX ORDER — ESCITALOPRAM OXALATE 10 MG/1
10 TABLET ORAL DAILY
Status: DISCONTINUED | OUTPATIENT
Start: 2020-12-18 | End: 2020-12-19 | Stop reason: HOSPADM

## 2020-12-18 RX ORDER — CEFAZOLIN SODIUM 1 G/3ML
2 INJECTION, POWDER, FOR SOLUTION INTRAMUSCULAR; INTRAVENOUS
Status: COMPLETED | OUTPATIENT
Start: 2020-12-18 | End: 2020-12-18

## 2020-12-18 RX ORDER — ROSUVASTATIN CALCIUM 10 MG/1
10 TABLET, COATED ORAL NIGHTLY
Status: DISCONTINUED | OUTPATIENT
Start: 2020-12-18 | End: 2020-12-19 | Stop reason: HOSPADM

## 2020-12-18 RX ORDER — LIDOCAINE HYDROCHLORIDE 10 MG/ML
1 INJECTION, SOLUTION EPIDURAL; INFILTRATION; INTRACAUDAL; PERINEURAL ONCE
Status: DISCONTINUED | OUTPATIENT
Start: 2020-12-18 | End: 2020-12-18

## 2020-12-18 RX ORDER — SODIUM CHLORIDE 0.9 % (FLUSH) 0.9 %
10 SYRINGE (ML) INJECTION
Status: DISCONTINUED | OUTPATIENT
Start: 2020-12-18 | End: 2020-12-18

## 2020-12-18 RX ORDER — HYDROMORPHONE HYDROCHLORIDE 1 MG/ML
0.2 INJECTION, SOLUTION INTRAMUSCULAR; INTRAVENOUS; SUBCUTANEOUS EVERY 5 MIN PRN
Status: DISCONTINUED | OUTPATIENT
Start: 2020-12-18 | End: 2020-12-18 | Stop reason: HOSPADM

## 2020-12-18 RX ORDER — FAMOTIDINE 10 MG/ML
INJECTION INTRAVENOUS
Status: DISPENSED
Start: 2020-12-18 | End: 2020-12-18

## 2020-12-18 RX ORDER — DIPHENOXYLATE HYDROCHLORIDE AND ATROPINE SULFATE 2.5; .025 MG/1; MG/1
1 TABLET ORAL 3 TIMES DAILY PRN
Status: DISCONTINUED | OUTPATIENT
Start: 2020-12-18 | End: 2020-12-19 | Stop reason: HOSPADM

## 2020-12-18 RX ORDER — LIDOCAINE HYDROCHLORIDE AND EPINEPHRINE 10; 10 MG/ML; UG/ML
INJECTION, SOLUTION INFILTRATION; PERINEURAL
Status: DISCONTINUED | OUTPATIENT
Start: 2020-12-18 | End: 2020-12-18 | Stop reason: HOSPADM

## 2020-12-18 RX ORDER — ACETAMINOPHEN 325 MG/1
650 TABLET ORAL EVERY 6 HOURS PRN
Status: DISCONTINUED | OUTPATIENT
Start: 2020-12-18 | End: 2020-12-19 | Stop reason: HOSPADM

## 2020-12-18 RX ORDER — SUCRALFATE 1 G/10ML
1 SUSPENSION ORAL EVERY 6 HOURS
Status: DISCONTINUED | OUTPATIENT
Start: 2020-12-18 | End: 2020-12-19 | Stop reason: HOSPADM

## 2020-12-18 RX ORDER — PROPOFOL 10 MG/ML
VIAL (ML) INTRAVENOUS
Status: DISCONTINUED | OUTPATIENT
Start: 2020-12-18 | End: 2020-12-18

## 2020-12-18 RX ORDER — MORPHINE SULFATE 2 MG/ML
2 INJECTION, SOLUTION INTRAMUSCULAR; INTRAVENOUS EVERY 4 HOURS PRN
Status: DISCONTINUED | OUTPATIENT
Start: 2020-12-18 | End: 2020-12-19 | Stop reason: HOSPADM

## 2020-12-18 RX ORDER — MIDAZOLAM HYDROCHLORIDE 1 MG/ML
INJECTION, SOLUTION INTRAMUSCULAR; INTRAVENOUS
Status: DISCONTINUED | OUTPATIENT
Start: 2020-12-18 | End: 2020-12-18

## 2020-12-18 RX ORDER — SODIUM CHLORIDE 0.9 % (FLUSH) 0.9 %
10 SYRINGE (ML) INJECTION
Status: DISCONTINUED | OUTPATIENT
Start: 2020-12-18 | End: 2020-12-19 | Stop reason: HOSPADM

## 2020-12-18 RX ORDER — ENOXAPARIN SODIUM 100 MG/ML
40 INJECTION SUBCUTANEOUS EVERY 24 HOURS
Status: DISCONTINUED | OUTPATIENT
Start: 2020-12-19 | End: 2020-12-19 | Stop reason: HOSPADM

## 2020-12-18 RX ORDER — LIDOCAINE HYDROCHLORIDE 20 MG/ML
INJECTION, SOLUTION EPIDURAL; INFILTRATION; INTRACAUDAL; PERINEURAL
Status: DISCONTINUED | OUTPATIENT
Start: 2020-12-18 | End: 2020-12-18

## 2020-12-18 RX ORDER — IBUPROFEN 200 MG
16 TABLET ORAL
Status: DISCONTINUED | OUTPATIENT
Start: 2020-12-18 | End: 2020-12-19 | Stop reason: HOSPADM

## 2020-12-18 RX ORDER — DEXAMETHASONE SODIUM PHOSPHATE 4 MG/ML
INJECTION, SOLUTION INTRA-ARTICULAR; INTRALESIONAL; INTRAMUSCULAR; INTRAVENOUS; SOFT TISSUE
Status: DISCONTINUED | OUTPATIENT
Start: 2020-12-18 | End: 2020-12-18

## 2020-12-18 RX ORDER — IBUPROFEN 200 MG
24 TABLET ORAL
Status: DISCONTINUED | OUTPATIENT
Start: 2020-12-18 | End: 2020-12-19 | Stop reason: HOSPADM

## 2020-12-18 RX ORDER — DICYCLOMINE HYDROCHLORIDE 20 MG/1
20 TABLET ORAL EVERY 6 HOURS PRN
Status: DISCONTINUED | OUTPATIENT
Start: 2020-12-18 | End: 2020-12-19 | Stop reason: HOSPADM

## 2020-12-18 RX ORDER — FAMOTIDINE 10 MG/ML
20 INJECTION INTRAVENOUS ONCE
Status: COMPLETED | OUTPATIENT
Start: 2020-12-18 | End: 2020-12-18

## 2020-12-18 RX ORDER — INSULIN ASPART 100 [IU]/ML
0-5 INJECTION, SOLUTION INTRAVENOUS; SUBCUTANEOUS
Status: DISCONTINUED | OUTPATIENT
Start: 2020-12-18 | End: 2020-12-19 | Stop reason: HOSPADM

## 2020-12-18 RX ORDER — FENTANYL CITRATE 50 UG/ML
INJECTION, SOLUTION INTRAMUSCULAR; INTRAVENOUS
Status: DISCONTINUED | OUTPATIENT
Start: 2020-12-18 | End: 2020-12-18

## 2020-12-18 RX ORDER — BRIMONIDINE TARTRATE 2 MG/ML
1 SOLUTION/ DROPS OPHTHALMIC 2 TIMES DAILY
Status: DISCONTINUED | OUTPATIENT
Start: 2020-12-18 | End: 2020-12-19 | Stop reason: HOSPADM

## 2020-12-18 RX ORDER — PANTOPRAZOLE SODIUM 40 MG/1
40 TABLET, DELAYED RELEASE ORAL DAILY
Status: DISCONTINUED | OUTPATIENT
Start: 2020-12-18 | End: 2020-12-19 | Stop reason: HOSPADM

## 2020-12-18 RX ORDER — ONDANSETRON 2 MG/ML
INJECTION INTRAMUSCULAR; INTRAVENOUS
Status: DISCONTINUED | OUTPATIENT
Start: 2020-12-18 | End: 2020-12-18

## 2020-12-18 RX ORDER — ROCURONIUM BROMIDE 10 MG/ML
INJECTION, SOLUTION INTRAVENOUS
Status: DISCONTINUED | OUTPATIENT
Start: 2020-12-18 | End: 2020-12-18

## 2020-12-18 RX ORDER — SUCCINYLCHOLINE CHLORIDE 20 MG/ML
INJECTION INTRAMUSCULAR; INTRAVENOUS
Status: DISCONTINUED | OUTPATIENT
Start: 2020-12-18 | End: 2020-12-18

## 2020-12-18 RX ORDER — SIMETHICONE 80 MG
2 TABLET,CHEWABLE ORAL 3 TIMES DAILY PRN
Status: DISCONTINUED | OUTPATIENT
Start: 2020-12-18 | End: 2020-12-19 | Stop reason: HOSPADM

## 2020-12-18 RX ORDER — FENTANYL CITRATE 50 UG/ML
25 INJECTION, SOLUTION INTRAMUSCULAR; INTRAVENOUS EVERY 5 MIN PRN
Status: COMPLETED | OUTPATIENT
Start: 2020-12-18 | End: 2020-12-18

## 2020-12-18 RX ORDER — ONDANSETRON 2 MG/ML
4 INJECTION INTRAMUSCULAR; INTRAVENOUS EVERY 6 HOURS PRN
Status: DISCONTINUED | OUTPATIENT
Start: 2020-12-18 | End: 2020-12-19 | Stop reason: HOSPADM

## 2020-12-18 RX ORDER — MAG HYDROX/ALUMINUM HYD/SIMETH 200-200-20
30 SUSPENSION, ORAL (FINAL DOSE FORM) ORAL
Status: DISCONTINUED | OUTPATIENT
Start: 2020-12-18 | End: 2020-12-19 | Stop reason: HOSPADM

## 2020-12-18 RX ORDER — AMITRIPTYLINE HYDROCHLORIDE 50 MG/1
50 TABLET, FILM COATED ORAL NIGHTLY
Status: DISCONTINUED | OUTPATIENT
Start: 2020-12-18 | End: 2020-12-19 | Stop reason: HOSPADM

## 2020-12-18 RX ORDER — GABAPENTIN 300 MG/1
600 CAPSULE ORAL 2 TIMES DAILY
Status: DISCONTINUED | OUTPATIENT
Start: 2020-12-18 | End: 2020-12-19 | Stop reason: HOSPADM

## 2020-12-18 RX ORDER — GLUCAGON 1 MG
1 KIT INJECTION
Status: DISCONTINUED | OUTPATIENT
Start: 2020-12-18 | End: 2020-12-19 | Stop reason: HOSPADM

## 2020-12-18 RX ORDER — PHENYLEPHRINE HCL IN 0.9% NACL 1 MG/10 ML
SYRINGE (ML) INTRAVENOUS
Status: DISCONTINUED | OUTPATIENT
Start: 2020-12-18 | End: 2020-12-18

## 2020-12-18 RX ORDER — MUPIROCIN 20 MG/G
OINTMENT TOPICAL 2 TIMES DAILY
Status: DISCONTINUED | OUTPATIENT
Start: 2020-12-18 | End: 2020-12-19 | Stop reason: HOSPADM

## 2020-12-18 RX ADMIN — FENTANYL CITRATE 50 MCG: 50 INJECTION INTRAMUSCULAR; INTRAVENOUS at 07:12

## 2020-12-18 RX ADMIN — Medication 100 MCG: at 07:12

## 2020-12-18 RX ADMIN — ALUMINUM HYDROXIDE, MAGNESIUM HYDROXIDE, AND SIMETHICONE 30 ML: 200; 200; 20 SUSPENSION ORAL at 11:12

## 2020-12-18 RX ADMIN — ROCURONIUM BROMIDE 5 MG: 10 INJECTION, SOLUTION INTRAVENOUS at 07:12

## 2020-12-18 RX ADMIN — MUPIROCIN: 20 OINTMENT TOPICAL at 09:12

## 2020-12-18 RX ADMIN — Medication 200 MCG: at 07:12

## 2020-12-18 RX ADMIN — AMLODIPINE BESYLATE 5 MG: 5 TABLET ORAL at 11:12

## 2020-12-18 RX ADMIN — HYDROCODONE BITARTRATE AND ACETAMINOPHEN 1 TABLET: 5; 325 TABLET ORAL at 11:12

## 2020-12-18 RX ADMIN — GABAPENTIN 600 MG: 300 CAPSULE ORAL at 09:12

## 2020-12-18 RX ADMIN — PROPOFOL 20 MG: 10 INJECTION, EMULSION INTRAVENOUS at 08:12

## 2020-12-18 RX ADMIN — CARVEDILOL 12.5 MG: 12.5 TABLET, FILM COATED ORAL at 09:12

## 2020-12-18 RX ADMIN — ALLOPURINOL 450 MG: 300 TABLET ORAL at 05:12

## 2020-12-18 RX ADMIN — SUCCINYLCHOLINE CHLORIDE 140 MG: 20 INJECTION, SOLUTION INTRAMUSCULAR; INTRAVENOUS; PARENTERAL at 07:12

## 2020-12-18 RX ADMIN — HYDROCODONE BITARTRATE AND ACETAMINOPHEN 1 TABLET: 5; 325 TABLET ORAL at 07:12

## 2020-12-18 RX ADMIN — LIDOCAINE HYDROCHLORIDE 100 MG: 20 INJECTION, SOLUTION EPIDURAL; INFILTRATION; INTRACAUDAL at 07:12

## 2020-12-18 RX ADMIN — Medication 100 MCG: at 08:12

## 2020-12-18 RX ADMIN — FENTANYL CITRATE 25 MCG: 50 INJECTION INTRAMUSCULAR; INTRAVENOUS at 12:12

## 2020-12-18 RX ADMIN — PANTOPRAZOLE SODIUM 40 MG: 40 TABLET, DELAYED RELEASE ORAL at 11:12

## 2020-12-18 RX ADMIN — FAMOTIDINE 20 MG: 10 INJECTION INTRAVENOUS at 06:12

## 2020-12-18 RX ADMIN — AMITRIPTYLINE HYDROCHLORIDE 50 MG: 50 TABLET, FILM COATED ORAL at 09:12

## 2020-12-18 RX ADMIN — SUCRALFATE 1 G: 1 SUSPENSION ORAL at 05:12

## 2020-12-18 RX ADMIN — Medication 100 MCG: at 09:12

## 2020-12-18 RX ADMIN — ALUMINUM HYDROXIDE, MAGNESIUM HYDROXIDE, AND SIMETHICONE 30 ML: 200; 200; 20 SUSPENSION ORAL at 05:12

## 2020-12-18 RX ADMIN — HYDROCODONE BITARTRATE AND ACETAMINOPHEN 1 TABLET: 5; 325 TABLET ORAL at 03:12

## 2020-12-18 RX ADMIN — MIDAZOLAM 2 MG: 1 INJECTION INTRAMUSCULAR; INTRAVENOUS at 07:12

## 2020-12-18 RX ADMIN — CARVEDILOL 12.5 MG: 12.5 TABLET, FILM COATED ORAL at 11:12

## 2020-12-18 RX ADMIN — ROSUVASTATIN CALCIUM 10 MG: 10 TABLET, FILM COATED ORAL at 09:12

## 2020-12-18 RX ADMIN — DEXAMETHASONE SODIUM PHOSPHATE 4 MG: 4 INJECTION INTRA-ARTICULAR; INTRALESIONAL; INTRAMUSCULAR; INTRAVENOUS; SOFT TISSUE at 07:12

## 2020-12-18 RX ADMIN — ALUMINUM HYDROXIDE, MAGNESIUM HYDROXIDE, AND SIMETHICONE 30 ML: 200; 200; 20 SUSPENSION ORAL at 09:12

## 2020-12-18 RX ADMIN — PROPOFOL 20 MG: 10 INJECTION, EMULSION INTRAVENOUS at 07:12

## 2020-12-18 RX ADMIN — SODIUM CHLORIDE 1000 ML: 0.9 INJECTION, SOLUTION INTRAVENOUS at 08:12

## 2020-12-18 RX ADMIN — GABAPENTIN 600 MG: 300 CAPSULE ORAL at 11:12

## 2020-12-18 RX ADMIN — BRIMONIDINE TARTRATE 1 DROP: 2 SOLUTION OPHTHALMIC at 09:12

## 2020-12-18 RX ADMIN — SUCRALFATE 1 G: 1 SUSPENSION ORAL at 01:12

## 2020-12-18 RX ADMIN — ESCITALOPRAM OXALATE 10 MG: 10 TABLET ORAL at 03:12

## 2020-12-18 RX ADMIN — FENTANYL CITRATE 25 MCG: 50 INJECTION INTRAMUSCULAR; INTRAVENOUS at 11:12

## 2020-12-18 RX ADMIN — CEFAZOLIN 2 G: 1 INJECTION, POWDER, FOR SOLUTION INTRAMUSCULAR; INTRAVENOUS at 07:12

## 2020-12-18 RX ADMIN — ONDANSETRON 4 MG: 2 INJECTION INTRAMUSCULAR; INTRAVENOUS at 09:12

## 2020-12-18 RX ADMIN — CEPHALEXIN 500 MG: 500 CAPSULE ORAL at 03:12

## 2020-12-18 RX ADMIN — PROPOFOL 30 MG: 10 INJECTION, EMULSION INTRAVENOUS at 07:12

## 2020-12-18 RX ADMIN — PROPOFOL 130 MG: 10 INJECTION, EMULSION INTRAVENOUS at 07:12

## 2020-12-18 RX ADMIN — PROPOFOL 30 MG: 10 INJECTION, EMULSION INTRAVENOUS at 08:12

## 2020-12-18 NOTE — OP NOTE
DATE OF PROCEDURE: 12/18/2020     PREOPERATIVE DIAGNOSES:   1. Left parotid mass  2. Weakness of left marginal mandibular nerve    POSTOPERATIVE DIAGNOSES:   Same    SURGEON:  Surgeon(s) and Role:     * Michael Pinzon MD - Primary      PROCEDURES PERFORMED:       ANESTHESIA: General      INDICATIONS FOR PROCEDURE:   Jarrett Charlton Jr. is a 70 y.o.  Man who I recently evaluated for a left-sided parotid mass.  He was noted on examination to have weakness of the left marginal mandibular nerve.  Fine-needle aspiration of this mass return with benign results.  Given the above findings, it was recommended that we proceed to the operating room for resection.    He was apprised of the risks, benefits and alternatives to surgery.  In spite of the risk inherent to surgery,he provided informed consent for the aforementioned procedures.     PROCEDURE IN DETAIL:  The patient was taken to the operating room and placed on the operating table in the supine position.  General endotracheal anesthesia was induced by the anesthesia team.   The facial nerve monitor was attached to the left face and calibrated in the standard fashion.  A left-sided modified Klever incision was marked and injected with several cc of 1% lidocaine with epinephrine.  The face and neck were then prepped and draped in standard sterile fashion.      To begin, a 15 blade was utilized to incise the skin.  Sharp dissection proceeded through the underlying subcutaneous tissue and platysma inferiorly and down to the level of the parotid fascia superiorly.  An anteriorly based subplatysmal flap was elevated over the entire to the parotid gland.  A posteriorly based flap was elevated to expose the sternocleidomastoid.  The tail of parotid was  from the sternocleidomastoid utilizing electrocautery.  It was then  from the external auditory canal in a similar manner.  The posterior belly of the digastric muscle was identified in the standard  position.  Meticulous dissection then proceeded down the external auditory canal to the level of the stylomastoid foramen.  The main trunk of the facial nerve was identified.  Upon initial identification, it stimulated readily with 1 milliampere of current via the nerve stimulator.  The main trunk of the facial nerve was dissected from proximal to distal.  There was noted be a very early branch point of the nerve with clear divisions going to the upper and lower face.  The lower division was dissected.  Dissection proceeded for several cm until the nerve appeared to be attenuated and became difficult to follow.  Felt that it was the safest course of action to find the nerve peripherally and traced retrograde.      The soft tissue attachments between the tumor and the fibrofatty tissue of the neck was then taken down.  The cervical branch of the facial nerve was then identified.  It was noted to be intimately involved with the tumor and was sacrificed.  Further dissection proceeded anteriorly and superiorly to identify the marginal mandibular nerve.  This was traced retrograde along the full extent of the tumor.  With the nerve in full view, the tumor was freed from the surrounding parotid gland and was ultimately amputated and sent to pathology frozen section analysis.  Frozen section revealed no evidence of malignancy.  During the dissection, though, there was noted to be marked adherence between the proximal margin mandibular nerve and the tumor capsule.  I was able to separate the nerve from the tumor capsule without injury to the nerve.    The neck was then irrigated with copious amounts of normal saline and suctioned dry.  Hemostasis was achieved with electrocautery.  The facial nerve stimulated readily in both the upper and lower divisions at the conclusion the procedure. Fibrillar   Was placed to assist with hemostasis.  The wound was then closed with 3-0 Vicryl, 4-0 Monocryl and Dermabond over 15 Serbian  Art drain.      Once the wound was closed, he was handed back to anesthesia.  He was awakened, extubated and transferred to recovery in satisfactory condition.    There were no intraoperative complications.  I was present for and participated in the entire procedure as dictated above.       ESTIMATED BLOOD LOSS:  25 cc    SPECIMENS:   Specimen (12h ago, onward)     left parotid mass for frozen and permanent

## 2020-12-18 NOTE — NURSING TRANSFER
Nursing Transfer Note      12/18/2020     Transfer To: 506    Transfer via stretcher    Transfer with cardiac monitoring    Transported by pct    Medicines sent: no    Chart send with patient: Yes    Notified: daughter

## 2020-12-18 NOTE — NURSING
Report received. Care assumed. Patient arrived via stretcher AAO x4. Pt lying supine in bed. Pt denies pain or any other concerns at this time. See assessment. Patient oriented to room.Bed in lowest position, side rails up x2, bed wheels locked and call light within reach, NADN. Will continue to monitor

## 2020-12-18 NOTE — TRANSFER OF CARE
"Anesthesia Transfer of Care Note    Patient: Jarrett Charlton Jr.    Procedure(s) Performed: Procedure(s) (LRB):  EXCISION, PAROTID GLAND (Left)    Patient location: PACU    Anesthesia Type: general    Transport from OR: Transported from OR on 6-10 L/min O2 by face mask with adequate spontaneous ventilation    Post pain: adequate analgesia    Post assessment: no apparent anesthetic complications    Post vital signs: stable    Level of consciousness: awake    Nausea/Vomiting: no nausea/vomiting    Complications: none    Transfer of care protocol was followed      Last vitals:   Visit Vitals  BP (!) 166/89 (BP Location: Right arm, Patient Position: Lying)   Pulse 77   Temp 36.6 °C (97.9 °F) (Oral)   Resp 16   Ht 6' 2" (1.88 m)   Wt 88.8 kg (195 lb 12.8 oz)   SpO2 98%   BMI 25.14 kg/m²     "

## 2020-12-18 NOTE — PROGRESS NOTES
Unable tor release orders- wrong admission type. Spoke with ENT resident Rubens on call-belkis change order from out to inpatient.

## 2020-12-18 NOTE — ANESTHESIA PROCEDURE NOTES
Intubation  Performed by: Abhi Elias MD  Authorized by: Abhi Elias MD     Intubation:     Induction:  Intravenous    Intubated:  Postinduction    Mask Ventilation:  Easy with oral airway    Attempts:  1    Attempted By:  Staff anesthesiologist    Method of Intubation:  Direct    Blade:  Sylvester 2    Laryngeal View Grade: Grade I - full view of chords      Difficult Airway Encountered?: No      Complications:  None    Airway Device:  Oral endotracheal tube    Airway Device Size:  7.0    Style/Cuff Inflation:  Cuffed    Tube secured:  23    Secured at:  The lips    Placement Verified By:  Capnometry    Complicating Factors:  None    Findings Post-Intubation:  BS equal bilateral

## 2020-12-18 NOTE — BRIEF OP NOTE
[facility]  Brief Operative Note    SUMMARY     Surgery Date: 12/18/2020     Surgeon(s) and Role:     * Michael Pinzon MD - Primary    Assisting Surgeon: Corie Wallace MD     Pre-op Diagnosis:  Parotid mass [K11.8]    Post-op Diagnosis:  Post-Op Diagnosis Codes:     * Parotid mass [K11.8]    Procedure(s) (LRB):  EXCISION, PAROTID GLAND (Left)    Anesthesia: General    Description of Procedure: left parotidectomy     Description of the findings of the procedure:  Left parotid mass, suspicious for Warthin's tumor    Estimated Blood Loss: 10 ml     Estimated Blood Loss has been documented.         Specimens:   Specimen (12h ago, onward)    None          [id number]

## 2020-12-19 ENCOUNTER — DOCUMENTATION ONLY (OUTPATIENT)
Dept: PRIMARY CARE CLINIC | Facility: CLINIC | Age: 70
End: 2020-12-19

## 2020-12-19 VITALS
SYSTOLIC BLOOD PRESSURE: 125 MMHG | DIASTOLIC BLOOD PRESSURE: 71 MMHG | BODY MASS INDEX: 25.18 KG/M2 | HEART RATE: 73 BPM | RESPIRATION RATE: 16 BRPM | OXYGEN SATURATION: 98 % | TEMPERATURE: 98 F | HEIGHT: 74 IN | WEIGHT: 196.19 LBS

## 2020-12-19 PROCEDURE — 25000003 PHARM REV CODE 250: Performed by: STUDENT IN AN ORGANIZED HEALTH CARE EDUCATION/TRAINING PROGRAM

## 2020-12-19 RX ORDER — CLOPIDOGREL BISULFATE 75 MG/1
75 TABLET ORAL DAILY
Status: DISCONTINUED | OUTPATIENT
Start: 2020-12-19 | End: 2020-12-19 | Stop reason: HOSPADM

## 2020-12-19 RX ORDER — ASPIRIN 81 MG/1
81 TABLET ORAL DAILY
Status: DISCONTINUED | OUTPATIENT
Start: 2020-12-19 | End: 2020-12-19 | Stop reason: HOSPADM

## 2020-12-19 RX ORDER — CEPHALEXIN 500 MG/1
500 CAPSULE ORAL EVERY 12 HOURS
Qty: 20 CAPSULE | Refills: 0 | Status: SHIPPED | OUTPATIENT
Start: 2020-12-19 | End: 2020-12-29

## 2020-12-19 RX ORDER — HYDROCODONE BITARTRATE AND ACETAMINOPHEN 5; 325 MG/1; MG/1
1 TABLET ORAL EVERY 6 HOURS PRN
Qty: 15 TABLET | Refills: 0 | Status: SHIPPED | OUTPATIENT
Start: 2020-12-19 | End: 2021-04-07 | Stop reason: CLARIF

## 2020-12-19 RX ADMIN — SUCRALFATE 1 G: 1 SUSPENSION ORAL at 01:12

## 2020-12-19 RX ADMIN — ALUMINUM HYDROXIDE, MAGNESIUM HYDROXIDE, AND SIMETHICONE 30 ML: 200; 200; 20 SUSPENSION ORAL at 06:12

## 2020-12-19 RX ADMIN — SUCRALFATE 1 G: 1 SUSPENSION ORAL at 06:12

## 2020-12-19 RX ADMIN — CEPHALEXIN 500 MG: 500 CAPSULE ORAL at 06:12

## 2020-12-19 RX ADMIN — CEPHALEXIN 500 MG: 500 CAPSULE ORAL at 01:12

## 2020-12-19 NOTE — DISCHARGE INSTRUCTIONS
Parotidectomy    The parotid is a gland near the ear. It helps produce and control the release of saliva. The facial nerve passes through the gland. This nerve controls the muscles of the face. If a tumor forms in the parotid gland, it can press on the facial nerve, causing discomfort and pain. Part or all of the face may be weak or paralyzed. To treat a parotid tumor, part or all of the gland is removed. This surgery is called parotidectomy. This sheet explains the surgery and what to expect.  Types of tumors  Most parotid tumors are benign (not cancer). A benign tumor may grow larger, but it will not spread to other parts of the body. In some cases, though, a tumor is cancer. It can become metastatic, meaning it spreads cancer cells to other parts of the body. Whether the tumor is benign or cancer, part or all of the parotid gland will be removed. If a tumor is cancer, nearby tissues or lymph nodes may be removed as well. And further cancer treatments, such as radiation, may be needed.  Protecting the facial nerve  During this surgery, steps are taken to protect the facial nerve from damage. This may include using a device called a facial nerve monitor to sense activity in and around the nerve. This helps to map the exact location of the nerve, so the healthcare provider can avoid touching it during surgery. But in some cases the nerve cant be completely protected. You and your healthcare provider will discuss whether your facial nerve is likely to be affected by the surgery and what your choices are.  Preparing for surgery  Prepare for the procedure as you have been instructed. Be sure to tell your healthcare provider about all medicines you take. This includes over-the-counter medicines. It also includes herbs and other supplements. You may need to stop taking some or all of them before surgery. Also, follow any directions youre given for not eating or drinking before surgery.  The day of surgery  The  surgery takes 3 to 5 hours.  Before the surgery begins:  · An IV line is put into a vein in your arm or hand. This line delivers fluids and medicines.  · To keep you free of pain during the surgery, youre given general anesthesia. This medicine puts you into a state like deep sleep through the surgery so you don't feel pain.  During the surgery:   · The healthcare provider makes an incision (cut) from the front of your ear to partway down the neck to expose the parotid gland.  · The facial nerve is located. Great care is taken to avoid harming this nerve. A facial nerve monitor (a machine with a small sensor that is put onto your cheek) may be used to map the nerves exact location. This helps avoid damage.  · The gland is removed.  · If cancer is present, a margin of tissue around the gland is also removed. Nearby lymph nodes may also be removed.  · The incision is closed with sutures (stitches), surgical glue, or both.  · A tube (drain) may be placed into the surgical area. This drains fluid that may build up after surgery. The drain will likely be removed before you go home.  After the surgery  You will be taken to a recovery room to wake up from the anesthesia. You may feel sleepy and nauseated at first. You will be given medicine to control pain. You may then be taken to a hospital room to stay overnight. Once you are ready to go home, you will be released to an adult family member or friend. Have someone stay with you for a few days to help care for you as your healing begins. If youre sent home with a drain, you will be shown how to care for it.  Recovering at home  Once at home, follow the instructions you have been given. Keep in mind that nerves take time to heal. It could be weeks or months before the facial nerve returns to normal. Discuss what to expect with your healthcare provider. During your recovery:  · Take all prescribed medicine as directed.  · Sleep with your head raised above the level of  your heart for 3 to 5 days after the surgery. This helps reduce swelling.  · Limit exercise as directed. Your healthcare provider will tell you when you can return to your normal activities and routine.  · Avoid driving until you are no longer taking pain medicines that make you drowsy.  · Care for your bandage and incision as directed. Do not get your incision or bandage wet until your healthcare provider says its OK.  · Check your incision daily for symptoms of infection listed below.  When to call the healthcare provider  Be sure you have a contact number for your healthcare provider. After you get home, call if you have any of the following:  · Chest pain or trouble breathing (call 911 or other emergency service)  · Fever of 100.4°F (38°C) or higher, or as directed by your healthcare provider  · Pain that gets worse or is not relieved by pain medicine  · Symptoms of infection at the incision site, such as increased redness or swelling, warmth, worsening pain, or foul-smelling drainage  · Severe facial swelling or weakness  · Trouble eating or drinking  · Clear fluid draining from the incision site  · Trouble breathing  · Severe nausea or vomiting  · Pain or swelling in the legs   Follow-up  During follow-up visits, your healthcare provider will check on your healing. If you have drains that need to be removed, this may be done 1 to 2 days after surgery. Stitches or staples will likely be removed 5 to 10 days after the surgery. If your surgery was done to treat cancer, you may need further evaluation and treatment. Your healthcare provider can tell you more.  Risks and possible complications  Risks of parotidectomy include:  · Infection  · Bleeding  · Ear numbness  · Injury to the facial nerve or some of its branches, which may result in permanent weakness or paralysis  · Seroma (buildup of fluid around the wound that causes swelling)  · Fernanda syndrome, a condition that causes sweating while eating due to changes  to the nerves  · Inability to remove the entire tumor, needing further cancer treatment  · Return of cancer  · Risks of anesthesia (you will discuss these with the anesthesiologist)   Talk with your healthcare providers about what signs to look for and when to call them. Make sure you know what number to call with questions during office hours, nights, and weekends.  Date Last Reviewed: 10/22/2015  © 3990-9646 [employer]. [address] [place] [address], [place] [address]. All rights reserved. This information is not intended as a substitute for professional medical care. Always follow your healthcare professional's instructions.

## 2020-12-19 NOTE — DISCHARGE SUMMARY
[facility]  Otorhinolaryngology-Head & Neck Surgery  Discharge Summary      Patient Name: Jarrett Charlton Jr.  MRN: [id number]  Admission Date: 12/18/2020  Hospital Length of Stay: 1 days  Discharge Date and Time:  12/19/2020 11:32 AM  Attending Physician: Michael Pinzon MD   Discharging Provider: Gurwinder Rivera MD  Primary Care Provider: Poli Gonzalez MD    HPI:   No notes on file    Procedure(s) (LRB):  EXCISION, PAROTID GLAND (Left)      Indwelling Lines/Drains at time of discharge:   Lines/Drains/Airways     Peripherally Inserted Central Catheter Line            PICC Double Lumen 11/23/20 1132 right brachial 26 days          Drain                 Ileostomy 01/07/19 1101  days         Closed/Suction Drain 12/18/20 0938 Left Neck Bulb 15 Fr. 1 day              Hospital Course: Admitted 12/18/20 after parotidectomy on the L  Post op doing well  Weakness of the facial nerve noted, mostly at the marginal mandibular nerve  HB 4/6 on the Left  Deemed HDS and fit for discharge home on POD1 12/19/20     Physical Exam:  NAD, breathing well on RA, alert  Equal chest rise bilaterally  HB 4/6 on the L, slight L marginal mandibular nerve weakness  Otherwise CN II-XII intact and symmetric  L parotid incision CDI  sophie drain now holding good suction, moderate serosanguinous drainage    Consults:     Significant Diagnostic Studies: Labs: BMP: No results for input(s): GLU, NA, K, CL, CO2, BUN, CREATININE, CALCIUM, MG in the last 48 hours., CMP No results for input(s): NA, K, CL, CO2, GLU, BUN, CREATININE, CALCIUM, PROT, ALBUMIN, BILITOT, ALKPHOS, AST, ALT, ANIONGAP, ESTGFRAFRICA, EGFRNONAA in the last 48 hours. and CBC No results for input(s): WBC, HGB, HCT, PLT in the last 48 hours.    Pending Diagnostic Studies:     Procedure Component Value Units Date/Time    Specimen to Pathology, Surgery ENT [[id number]] Collected: 12/18/20 0959    Order Status: Sent Lab Status: In process Updated: 12/18/20  1327        Final Active Diagnoses:    Diagnosis Date Noted POA    PRINCIPAL PROBLEM:  Parotid mass [K11.8] 12/08/2020 Yes      Problems Resolved During this Admission:      Discharged Condition: good    Disposition: Home or Self Care    Follow Up:  Follow-up Information     Rosaline Barrow NP In 3 days.    Specialty: Otolaryngology  Contact information:  [address]  [facility] [address]  [phone]                 Patient Instructions:      Diet Adult Regular     Notify your health care provider if you experience any of the following:  temperature >100.4     Notify your health care provider if you experience any of the following:  persistent nausea and vomiting or diarrhea     Notify your health care provider if you experience any of the following:  severe uncontrolled pain     Notify your health care provider if you experience any of the following:  redness, tenderness, or signs of infection (pain, swelling, redness, odor or green/yellow discharge around incision site)     Notify your health care provider if you experience any of the following:  difficulty breathing or increased cough     Notify your health care provider if you experience any of the following:  severe persistent headache     Notify your health care provider if you experience any of the following:  worsening rash     Notify your health care provider if you experience any of the following:  persistent dizziness, light-headedness, or visual disturbances     Notify your health care provider if you experience any of the following:  increased confusion or weakness     No dressing needed     Activity as tolerated     Medications:  Reconciled Home Medications:      Medication List      START taking these medications    cephALEXin 500 MG capsule  Commonly known as: KEFLEX  Take 1 capsule (500 mg total) by mouth every 12 (twelve) hours. for 10 days     HYDROcodone-acetaminophen 5-325 mg per tablet  Commonly known as: NORCO  Take 1 tablet by mouth  every 6 (six) hours as needed for Pain.        CONTINUE taking these medications    ACCU-CHEK PAUL CONTROL SOLN Soln  Generic drug: blood glucose control high,low  1 drop by NOT APPLICABLE route once daily.     ACCU-CHEK SOFTCLIX LANCETS Misc  Generic drug: lancets  1 lancet by NOT APPLICABLE route once daily.     allopurinoL 300 MG tablet  Commonly known as: ZYLOPRIM  Take 1.5 tablets (450 mg total) by mouth once daily.     amitriptyline 25 MG tablet  Commonly known as: ELAVIL  Take 2 tablets (50 mg total) by mouth every evening.     amLODIPine 5 MG tablet  Commonly known as: NORVASC  Take 5 mg by mouth once daily.     aspirin 81 MG EC tablet  Commonly known as: ECOTRIN  Take 81 mg by mouth once daily.     BD ALCOHOL SWABS Padm  Generic drug: alcohol swabs     blood sugar diagnostic Strp  Commonly known as: ONETOUCH ULTRA TEST  USE ONE STRIP TO CHECK GLUCOSE EVERY DAY     brimonidine 0.2% 0.2 % Drop  Commonly known as: ALPHAGAN  INSTILL 1 DROP INTO EACH EYE TWICE DAILY     calcium citrate-vitamin D3 315 mg-6.25 mcg (250 unit) Tab  Commonly known as: CITRACAL + D MAXIMUM  Take 1 tablet by mouth once. for 1 dose     carvediloL 12.5 MG tablet  Commonly known as: COREG  Take 12.5 mg by mouth 2 (two) times daily.     clopidogreL 75 mg tablet  Commonly known as: PLAVIX  once daily.     dicyclomine 20 mg tablet  Commonly known as: BENTYL  Take 1 tablet (20 mg total) by mouth every 6 (six) hours as needed (abd cramp).     diphenoxylate-atropine 2.5-0.025 mg 2.5-0.025 mg per tablet  Commonly known as: LOMOTIL  Take 1 tablet by mouth 3 (three) times daily as needed for Diarrhea.     escitalopram oxalate 10 MG tablet  Commonly known as: LEXAPRO  Take 10 mg by mouth once daily.     * gabapentin 600 MG tablet  Commonly known as: NEURONTIN  Take 1 tablet (600 mg total) by mouth 2 (two) times daily.     * gabapentin 300 MG capsule  Commonly known as: NEURONTIN  Take 2 capsules (600 mg total) by mouth 2 (two) times daily.      glimepiride 4 MG tablet  Commonly known as: AMARYL  Take 1 tablet (4 mg total) by mouth daily with breakfast.     loperamide 2 mg Tab  Commonly known as: IMODIUM A-D  Take 2 mg by mouth once daily.     magnesium oxide 400 mg (241.3 mg magnesium) tablet  Commonly known as: MAG-OX  Take 400 mg by mouth 2 (two) times daily. Take 2 tablets (800 mg) two times a day.     omeprazole 40 MG capsule  Commonly known as: PRILOSEC  Take 1 capsule (40 mg total) by mouth once daily.     rosuvastatin 10 MG tablet  Commonly known as: CRESTOR  Take 1 tablet (10 mg total) by mouth every evening.     simethicone 125 mg Cap capsule  Commonly known as: MYLICON  Take 1 capsule (125 mg total) by mouth 4 (four) times daily as needed for Flatulence (gas).     VITAMIN D ORAL  Take by mouth.         * This list has 2 medication(s) that are the same as other medications prescribed for you. Read the directions carefully, and ask your doctor or other care provider to review them with you.              Time spent on the discharge of patient: 15 minutes    Gurwinder Rivera MD  Otorhinolaryngology-Head & Neck Surgery  [facility]

## 2020-12-19 NOTE — NURSING
sophie drain bulb won't stay deflated. Small amt drainage in bulb noted. ENT on call notified, stated to place it to low intermittant wall suction. Placed as ordered, will continue to monitor.

## 2020-12-19 NOTE — HOSPITAL COURSE
Admitted 12/18/20 after parotidectomy on the L  Post op doing well  Weakness of the facial nerve noted, mostly at the marginal mandibular nerve  HB 4/6 on the Left  Deemed HDS and fit for discharge home on POD1 12/19/20

## 2020-12-19 NOTE — NURSING
Pt verbalized understanding of DC instructions and care of drain. IV sites removed. No concerns at this time. Will request transport when pts ride arrives.

## 2020-12-20 NOTE — PROGRESS NOTES
[facility] pharmacist performed a medication reconciliation on the patient's controlled substances and psychotropic medications using the [employer] prescription medication fills portal.    Patient has filled several intermittent prescriptions (7 or less days) for oxycodone 5 mg four times daily (30 morphine equivalents per day) in the past three months from multiple providers and is deemed to be at low risk of overdose per [employer] records as no prescriptions overlapped.  Co-prescription of naloxone is currently not required.  Continual monitoring and assessment of additional risk factors for opioid overdose should always be considered.    Patient has filled >3 opioid prescriptions in the past 4 month, and therefore meets the chronic opioid criteria for enrollment in the program.    Patient currently does not have an active fill of an antidepressant at this time per [employer] reports.  Recent regimen of escitalopram 10 mg daily from 6/28/20-9/28/20. Patient has also had a recent 15-day fill of amitriptyline 50 mg nightly and imipramine 75 mg nightly for 30-days in 2019. [facility] pharmacist has not confirmed adherence by means of contacting the patient.  Follow up warranted on adherence to this regimen.    Recommendations for PCP:  1.  Co-prescription of naloxone not currently required, though should always be considered in a patient on opioid therapy from multiple providers  2.  Patient has no active fills of an antidepressant but has had a recent trial of escitalopram 10 mg daily.  [facility] pharmacist will determine his role in treatment following patient's intake appointment with [facility] LCSW.      Marbin Ayala, PharmD, BCPP  [facility] Pharmacist

## 2020-12-21 ENCOUNTER — OFFICE VISIT (OUTPATIENT)
Dept: OTOLARYNGOLOGY | Facility: CLINIC | Age: 70
End: 2020-12-21
Payer: MEDICARE

## 2020-12-21 ENCOUNTER — PATIENT OUTREACH (OUTPATIENT)
Dept: ADMINISTRATIVE | Facility: CLINIC | Age: 70
End: 2020-12-21

## 2020-12-21 VITALS
BODY MASS INDEX: 26.35 KG/M2 | DIASTOLIC BLOOD PRESSURE: 86 MMHG | HEART RATE: 84 BPM | SYSTOLIC BLOOD PRESSURE: 144 MMHG | WEIGHT: 205.25 LBS

## 2020-12-21 DIAGNOSIS — K11.8 PAROTID MASS: Primary | ICD-10-CM

## 2020-12-21 PROCEDURE — 3008F PR BODY MASS INDEX (BMI) DOCUMENTED: ICD-10-PCS | Mod: CPTII,S$GLB,, | Performed by: NURSE PRACTITIONER

## 2020-12-21 PROCEDURE — 99999 PR PBB SHADOW E&M-EST. PATIENT-LVL IV: ICD-10-PCS | Mod: PBBFAC,,, | Performed by: NURSE PRACTITIONER

## 2020-12-21 PROCEDURE — 1126F AMNT PAIN NOTED NONE PRSNT: CPT | Mod: S$GLB,,, | Performed by: NURSE PRACTITIONER

## 2020-12-21 PROCEDURE — 1101F PR PT FALLS ASSESS DOC 0-1 FALLS W/OUT INJ PAST YR: ICD-10-PCS | Mod: CPTII,S$GLB,, | Performed by: NURSE PRACTITIONER

## 2020-12-21 PROCEDURE — 99024 POSTOP FOLLOW-UP VISIT: CPT | Mod: S$GLB,,, | Performed by: NURSE PRACTITIONER

## 2020-12-21 PROCEDURE — 1126F PR PAIN SEVERITY QUANTIFIED, NO PAIN PRESENT: ICD-10-PCS | Mod: S$GLB,,, | Performed by: NURSE PRACTITIONER

## 2020-12-21 PROCEDURE — 99999 PR PBB SHADOW E&M-EST. PATIENT-LVL IV: CPT | Mod: PBBFAC,,, | Performed by: NURSE PRACTITIONER

## 2020-12-21 PROCEDURE — 99024 PR POST-OP FOLLOW-UP VISIT: ICD-10-PCS | Mod: S$GLB,,, | Performed by: NURSE PRACTITIONER

## 2020-12-21 PROCEDURE — 3288F FALL RISK ASSESSMENT DOCD: CPT | Mod: CPTII,S$GLB,, | Performed by: NURSE PRACTITIONER

## 2020-12-21 PROCEDURE — 3288F PR FALLS RISK ASSESSMENT DOCUMENTED: ICD-10-PCS | Mod: CPTII,S$GLB,, | Performed by: NURSE PRACTITIONER

## 2020-12-21 PROCEDURE — 3008F BODY MASS INDEX DOCD: CPT | Mod: CPTII,S$GLB,, | Performed by: NURSE PRACTITIONER

## 2020-12-21 PROCEDURE — 1101F PT FALLS ASSESS-DOCD LE1/YR: CPT | Mod: CPTII,S$GLB,, | Performed by: NURSE PRACTITIONER

## 2020-12-21 RX ORDER — PANTOPRAZOLE SODIUM 40 MG/1
TABLET, DELAYED RELEASE ORAL
COMMUNITY
Start: 2020-12-13 | End: 2021-04-07 | Stop reason: CLARIF

## 2020-12-21 NOTE — PROGRESS NOTES
Chief Complaint   Patient presents with    Post-op Evaluation     drain removal       HPI   70 y.o. male returns for a post op visit. He has been doing well since his surgery. His pain is well controlled. TABBY drain output has been 20ml int he past 12 hours. He denies fever, chills, or drainage from his incision.     Review of Systems   Constitutional: Negative for fatigue and unexpected weight change.   HENT: Per HPI.  Eyes: Negative for visual disturbance.   Respiratory: Negative for shortness of breath, hemoptysis   Cardiovascular: Negative for chest pain and palpitations.   Musculoskeletal: Negative for decreased ROM, back pain.   Skin: Negative for rash, sunburn, itching.   Neurological: Negative for dizziness and seizures.   Hematological: Negative for adenopathy. Does not bruise/bleed easily.   Endocrine: Negative for rapid weight loss/weight gain, heat/cold intolerance.     Past Medical History   Patient Active Problem List   Diagnosis    Type 2 diabetes mellitus with stage 3 chronic kidney disease, without long-term current use of insulin    Essential hypertension    Chronic renal impairment    Idiopathic chronic gout of multiple sites with tophus    HLD (hyperlipidemia)    BPH (benign prostatic hyperplasia)    Disc disease, degenerative, cervical    GERD (gastroesophageal reflux disease)    History of ulcerative colitis    CKD (chronic kidney disease) stage 3, GFR 30-59 ml/min    Proteinuria    DDD (degenerative disc disease)    Generalized osteoarthritis    Elevated PSA    S/P TURP    CAD (coronary artery disease)    Presence of arterial stent    Abnormal LFTs    SBO (small bowel obstruction)    High output ileostomy    History of gout    History of elevated PSA    History of BPH    Bowel obstruction    Parastomal hernia with obstruction and without gangrene    Incisional hernia, without obstruction or gangrene    Hyperkalemia    Metabolic acidosis with normal anion gap and  bicarbonate losses    Pleural plaque    Aortic atherosclerosis    Partial small bowel obstruction    Type 2 diabetes mellitus with diabetic polyneuropathy, without long-term current use of insulin    Ileostomy in place    Status post repair of ventral hernia    Parotid mass           Past Surgical History   Past Surgical History:   Procedure Laterality Date    cardiac stents      CATARACT EXTRACTION Bilateral     CERVICAL FUSION      colectomy and ileostomy  1985    EXCISION OF PAROTID GLAND Left 12/18/2020    Procedure: EXCISION, PAROTID GLAND;  Surgeon: Michael Pinzon MD;  [facility];  Service: ENT;  Laterality: Left;    LYSIS OF ADHESIONS N/A 11/9/2020    Procedure: LYSIS, ADHESIONS,  ERAS low;  Surgeon: CED Haley MD;  [facility];  Service: Colon and Rectal;  Laterality: N/A;    REPAIR, HERNIA, PARASTOMAL N/A 11/9/2020    Procedure: REPAIR, HERNIA, PARASTOMAL;  Surgeon: CED Haley MD;  [facility];  Service: Colon and Rectal;  Laterality: N/A;    TRANSURETHRAL RESECTION OF PROSTATE (TURP) WITHOUT USE OF LASER N/A 1/23/2019    Procedure: TURP, WITHOUT USING LASER BIPOLAR;  Surgeon: Catarino Mota MD;  [facility];  Service: Urology;  Laterality: N/A;  1.5 HOURS         Family History   Family History   Problem Relation Age of Onset    Cancer Father     Diabetes Father     Dementia Mother     Melanoma Neg Hx     Hypertension Neg Hx     Arthritis Neg Hx            Social History   .  Social History     Socioeconomic History    Marital status:      Spouse name: Not on file    Number of children: 1    Years of education: Not on file    Highest education level: Not on file   Occupational History    Occupation:  x 44 years     Comment: Retired    Occupation: [place]    Social Needs    Financial resource strain: Not on file    Food insecurity     Worry: Not on file      Inability: Not on file    Transportation needs     Medical: Not on file     Non-medical: Not on file   Tobacco Use    Smoking status: Never Smoker    Smokeless tobacco: Never Used   Substance and Sexual Activity    Alcohol use: No    Drug use: No    Sexual activity: Not Currently     Partners: Female   Lifestyle    Physical activity     Days per week: Not on file     Minutes per session: Not on file    Stress: Not on file   Relationships    Social connections     Talks on phone: Not on file     Gets together: Not on file     Attends Yazidism service: Not on file     Active member of club or organization: Not on file     Attends meetings of clubs or organizations: Not on file     Relationship status: Not on file   Other Topics Concern    Not on file   Social History Narrative    Not on file         Allergies   Review of patient's allergies indicates:  No Known Allergies        Physical Exam     Vitals:    12/21/20 0931   BP: (!) 144/86   Pulse: 84         Body mass index is 26.35 kg/m².      General: AOx3, NAD   Respiratory:  Symmetric chest rise, normal effort  Neck: No scars.  No cervical lymphadenopathy, thyromegaly or thyroid nodules.  Normal range of motion.  Left neck incision CDI.  No drainage, or fluid collection noted.  Small area of redness surrounding incision.  Nontender.  Edges well approximated. TABBY drain with 20ml of serosanguinous drainage.  Face: House Brackmann II to left face.      Assessment/Plan  Problem List Items Addressed This Visit        ENT    Parotid mass - Primary     He is healing well.  We discussed that his TABBY drain output is too high for removal today.  He will contact me when output is less than 30 mL in 24 hours.  Questions answered.

## 2020-12-21 NOTE — TELEPHONE ENCOUNTER
Called pt. No answer left VM to return my call regarding a hospital f/u - pt. Had parotid mass removal has a f/u scheduled with his surgeron on 12/29, want to set up apt. Following with Dr. Gonzalez.

## 2020-12-21 NOTE — ASSESSMENT & PLAN NOTE
He is healing well.  We discussed that his TABBY drain output is too high for removal today.  He will contact me when output is less than 30 mL in 24 hours.  Questions answered.

## 2020-12-22 ENCOUNTER — TELEPHONE (OUTPATIENT)
Dept: PRIMARY CARE CLINIC | Facility: CLINIC | Age: 70
End: 2020-12-22

## 2020-12-22 ENCOUNTER — TELEPHONE (OUTPATIENT)
Dept: ADMINISTRATIVE | Facility: CLINIC | Age: 70
End: 2020-12-22

## 2020-12-22 NOTE — PROGRESS NOTES
Mr. Charlton was referred to the BHI program by Dr. Poli Gonzalez PCP.  Leah Fritz CHW reached out to patient who stated he was not interested in enrolling in the BHI program.    Reason for Decline:  Mr. Charlton stated he is doing fine and does not feel he needs counseling at this time.   CHW informed patient to notify his PCP of his wishes to engage in the BHI program in the future.  CHW sent follow-up message to PCP via [employer].

## 2020-12-23 ENCOUNTER — TELEPHONE (OUTPATIENT)
Dept: OTOLARYNGOLOGY | Facility: CLINIC | Age: 70
End: 2020-12-23

## 2020-12-23 LAB — FUNGUS SPEC CULT: NORMAL

## 2020-12-23 NOTE — TELEPHONE ENCOUNTER
----- Message from Jerome Alvarez sent at 12/23/2020  8:27 AM CST -----  Regarding: drain removal  Pt would like to know if he can come in today for his drain removal. Please give pt a call back at [phone] .

## 2020-12-23 NOTE — PROGRESS NOTES
Chief Complaint   Patient presents with    drain removal       HPI   70 y.o. male returns for a post op visit. He has been doing well since his surgery. His pain is well controlled. TABBY drain output has been <30ml in the past 24 hours. He denies fever, chills, or drainage from his incision.     Review of Systems   Constitutional: Negative for fatigue and unexpected weight change.   HENT: Per HPI.  Eyes: Negative for visual disturbance.   Respiratory: Negative for shortness of breath, hemoptysis   Cardiovascular: Negative for chest pain and palpitations.   Musculoskeletal: Negative for decreased ROM, back pain.   Skin: Negative for rash, sunburn, itching.   Neurological: Negative for dizziness and seizures.   Hematological: Negative for adenopathy. Does not bruise/bleed easily.   Endocrine: Negative for rapid weight loss/weight gain, heat/cold intolerance.     Past Medical History   Patient Active Problem List   Diagnosis    Type 2 diabetes mellitus with stage 3 chronic kidney disease, without long-term current use of insulin    Essential hypertension    Chronic renal impairment    Idiopathic chronic gout of multiple sites with tophus    HLD (hyperlipidemia)    BPH (benign prostatic hyperplasia)    Disc disease, degenerative, cervical    GERD (gastroesophageal reflux disease)    History of ulcerative colitis    CKD (chronic kidney disease) stage 3, GFR 30-59 ml/min    Proteinuria    DDD (degenerative disc disease)    Generalized osteoarthritis    Elevated PSA    S/P TURP    CAD (coronary artery disease)    Presence of arterial stent    Abnormal LFTs    SBO (small bowel obstruction)    High output ileostomy    History of gout    History of elevated PSA    History of BPH    Bowel obstruction    Parastomal hernia with obstruction and without gangrene    Incisional hernia, without obstruction or gangrene    Hyperkalemia    Metabolic acidosis with normal anion gap and bicarbonate losses     Pleural plaque    Aortic atherosclerosis    Partial small bowel obstruction    Type 2 diabetes mellitus with diabetic polyneuropathy, without long-term current use of insulin    Ileostomy in place    Status post repair of ventral hernia    Parotid mass           Past Surgical History   Past Surgical History:   Procedure Laterality Date    cardiac stents      CATARACT EXTRACTION Bilateral     CERVICAL FUSION      colectomy and ileostomy  1985    EXCISION OF PAROTID GLAND Left 12/18/2020    Procedure: EXCISION, PAROTID GLAND;  Surgeon: Michael Pinzon MD;  [facility];  Service: ENT;  Laterality: Left;    LYSIS OF ADHESIONS N/A 11/9/2020    Procedure: LYSIS, ADHESIONS,  ERAS low;  Surgeon: CED Haley MD;  [facility];  Service: Colon and Rectal;  Laterality: N/A;    REPAIR, HERNIA, PARASTOMAL N/A 11/9/2020    Procedure: REPAIR, HERNIA, PARASTOMAL;  Surgeon: CED Haley MD;  [facility];  Service: Colon and Rectal;  Laterality: N/A;    TRANSURETHRAL RESECTION OF PROSTATE (TURP) WITHOUT USE OF LASER N/A 1/23/2019    Procedure: TURP, WITHOUT USING LASER BIPOLAR;  Surgeon: Catarino Mota MD;  [facility];  Service: Urology;  Laterality: N/A;  1.5 HOURS         Family History   Family History   Problem Relation Age of Onset    Cancer Father     Diabetes Father     Dementia Mother     Melanoma Neg Hx     Hypertension Neg Hx     Arthritis Neg Hx            Social History   .  Social History     Socioeconomic History    Marital status:      Spouse name: Not on file    Number of children: 1    Years of education: Not on file    Highest education level: Not on file   Occupational History    Occupation:  x 44 years     Comment: Retired    Occupation: [place]    Social Needs    Financial resource strain: Not on file    Food insecurity     Worry: Not on file     Inability: Not on file     Transportation needs     Medical: Not on file     Non-medical: Not on file   Tobacco Use    Smoking status: Never Smoker    Smokeless tobacco: Never Used   Substance and Sexual Activity    Alcohol use: No    Drug use: No    Sexual activity: Not Currently     Partners: Female   Lifestyle    Physical activity     Days per week: Not on file     Minutes per session: Not on file    Stress: Not on file   Relationships    Social connections     Talks on phone: Not on file     Gets together: Not on file     Attends Gnosticist service: Not on file     Active member of club or organization: Not on file     Attends meetings of clubs or organizations: Not on file     Relationship status: Not on file   Other Topics Concern    Not on file   Social History Narrative    Not on file         Allergies   Review of patient's allergies indicates:  No Known Allergies        Physical Exam     Vitals:    12/24/20 0932   BP: (!) 141/86   Pulse: 86         Body mass index is 25.81 kg/m².      General: AOx3, NAD   Respiratory:  Symmetric chest rise, normal effort  Neck: No scars.  No cervical lymphadenopathy, thyromegaly or thyroid nodules.  Normal range of motion.  Left neck incision CDI.  No drainage, or fluid collection noted. Redness has resolved.  Nontender.  Edges well approximated. TABBY drain with small amount of serosanguinous drainage, removed without difficulty.  Face: House Brackmann II to left face.      Assessment/Plan  Problem List Items Addressed This Visit        ENT    Parotid mass - Primary     Healing well. Drain removed. Path pending. RTC in 2 weeks, sooner if needed.

## 2020-12-24 ENCOUNTER — OFFICE VISIT (OUTPATIENT)
Dept: OTOLARYNGOLOGY | Facility: CLINIC | Age: 70
End: 2020-12-24
Payer: MEDICARE

## 2020-12-24 VITALS
WEIGHT: 201 LBS | SYSTOLIC BLOOD PRESSURE: 141 MMHG | DIASTOLIC BLOOD PRESSURE: 86 MMHG | HEART RATE: 86 BPM | BODY MASS INDEX: 25.81 KG/M2

## 2020-12-24 DIAGNOSIS — K11.8 PAROTID MASS: Primary | ICD-10-CM

## 2020-12-24 PROCEDURE — 3008F BODY MASS INDEX DOCD: CPT | Mod: CPTII,S$GLB,, | Performed by: NURSE PRACTITIONER

## 2020-12-24 PROCEDURE — 99999 PR PBB SHADOW E&M-EST. PATIENT-LVL V: CPT | Mod: PBBFAC,,, | Performed by: NURSE PRACTITIONER

## 2020-12-24 PROCEDURE — 1126F AMNT PAIN NOTED NONE PRSNT: CPT | Mod: S$GLB,,, | Performed by: NURSE PRACTITIONER

## 2020-12-24 PROCEDURE — 99024 POSTOP FOLLOW-UP VISIT: CPT | Mod: S$GLB,,, | Performed by: NURSE PRACTITIONER

## 2020-12-24 PROCEDURE — 99024 PR POST-OP FOLLOW-UP VISIT: ICD-10-PCS | Mod: S$GLB,,, | Performed by: NURSE PRACTITIONER

## 2020-12-24 PROCEDURE — 99999 PR PBB SHADOW E&M-EST. PATIENT-LVL V: ICD-10-PCS | Mod: PBBFAC,,, | Performed by: NURSE PRACTITIONER

## 2020-12-24 PROCEDURE — 1126F PR PAIN SEVERITY QUANTIFIED, NO PAIN PRESENT: ICD-10-PCS | Mod: S$GLB,,, | Performed by: NURSE PRACTITIONER

## 2020-12-24 PROCEDURE — 3008F PR BODY MASS INDEX (BMI) DOCUMENTED: ICD-10-PCS | Mod: CPTII,S$GLB,, | Performed by: NURSE PRACTITIONER

## 2020-12-28 LAB
FINAL PATHOLOGIC DIAGNOSIS: NORMAL
FROZEN SECTION DIAGNOSIS: NORMAL
FROZEN SECTION FOOTNOTE: NORMAL
GROSS: NORMAL
Lab: NORMAL

## 2020-12-29 ENCOUNTER — OFFICE VISIT (OUTPATIENT)
Dept: SURGERY | Facility: CLINIC | Age: 70
End: 2020-12-29
Payer: MEDICARE

## 2020-12-29 VITALS
DIASTOLIC BLOOD PRESSURE: 89 MMHG | WEIGHT: 198.19 LBS | HEIGHT: 74 IN | SYSTOLIC BLOOD PRESSURE: 155 MMHG | BODY MASS INDEX: 25.44 KG/M2 | HEART RATE: 85 BPM

## 2020-12-29 DIAGNOSIS — Z93.2 ILEOSTOMY IN PLACE: Primary | ICD-10-CM

## 2020-12-29 PROCEDURE — 99999 PR PBB SHADOW E&M-EST. PATIENT-LVL V: CPT | Mod: PBBFAC,,, | Performed by: COLON & RECTAL SURGERY

## 2020-12-29 PROCEDURE — 99024 POSTOP FOLLOW-UP VISIT: CPT | Mod: S$GLB,,, | Performed by: COLON & RECTAL SURGERY

## 2020-12-29 PROCEDURE — 3288F FALL RISK ASSESSMENT DOCD: CPT | Mod: CPTII,S$GLB,, | Performed by: COLON & RECTAL SURGERY

## 2020-12-29 PROCEDURE — 1125F AMNT PAIN NOTED PAIN PRSNT: CPT | Mod: S$GLB,,, | Performed by: COLON & RECTAL SURGERY

## 2020-12-29 PROCEDURE — 3288F PR FALLS RISK ASSESSMENT DOCUMENTED: ICD-10-PCS | Mod: CPTII,S$GLB,, | Performed by: COLON & RECTAL SURGERY

## 2020-12-29 PROCEDURE — 1101F PT FALLS ASSESS-DOCD LE1/YR: CPT | Mod: CPTII,S$GLB,, | Performed by: COLON & RECTAL SURGERY

## 2020-12-29 PROCEDURE — 1125F PR PAIN SEVERITY QUANTIFIED, PAIN PRESENT: ICD-10-PCS | Mod: S$GLB,,, | Performed by: COLON & RECTAL SURGERY

## 2020-12-29 PROCEDURE — 99999 PR PBB SHADOW E&M-EST. PATIENT-LVL V: ICD-10-PCS | Mod: PBBFAC,,, | Performed by: COLON & RECTAL SURGERY

## 2020-12-29 PROCEDURE — 1101F PR PT FALLS ASSESS DOC 0-1 FALLS W/OUT INJ PAST YR: ICD-10-PCS | Mod: CPTII,S$GLB,, | Performed by: COLON & RECTAL SURGERY

## 2020-12-29 PROCEDURE — 99024 PR POST-OP FOLLOW-UP VISIT: ICD-10-PCS | Mod: S$GLB,,, | Performed by: COLON & RECTAL SURGERY

## 2020-12-29 PROCEDURE — 3008F BODY MASS INDEX DOCD: CPT | Mod: CPTII,S$GLB,, | Performed by: COLON & RECTAL SURGERY

## 2020-12-29 PROCEDURE — 3008F PR BODY MASS INDEX (BMI) DOCUMENTED: ICD-10-PCS | Mod: CPTII,S$GLB,, | Performed by: COLON & RECTAL SURGERY

## 2020-12-29 NOTE — PROGRESS NOTES
CRS Office Visit Postop    Referring Md:   No referring provider defined for this encounter.    SUBJECTIVE:     Chief Complaint: ileostomy problems    History of Present Illness:  The patient is an established patient to this practice.   Course is as follows:  Patient is a 70 y.o. male presents with ileostomy dysfunction  History of ulcerative colitis s/p total proctocolectomy with end ileostomy (1985)  He has required revisions of the ileostomy and ultimately it was transposed to the right side due to parastomal and incisional hernias.  He has had intermittent problems with syncope due to dehydration from the high output ostomy and takes imodium and Pedialyte as needed as well as daily Citrucel.  History also includes diabetes (on metformin) gout, CKD II and HTN.  He had a TURP in 1/2019 and is under surveillance with regular PSAs.     Admitted for bowel obstruction secondary to incarcerated peristomal hernia on 07/19/2019.  This was reduced in the ER with return of bowel function.     He was seen in follow-up multiple times regarding intermittent small-bowel obstruction as well as diarrhea requiring Imodium.  He elected to undergo revision of the ileostomy with repair of peristomal hernia.    11/9/2020:   1.  Open parastomal hernia repair with mesh underlay with phasix ST mesh in Sugar Spencer fashion  2.  Extensive lysis of adhesions greater than 3 hr    Postoperatively, his course was notable for delayed return of bowel function with prolonged postoperative ileus requiring NG tube placement and TPN.  He recovered slowly over a period of 2 weeks and was able to be discharged.    Current status:   12/7/20:  Improved.  Having regular ostomy output.  Ostomy output is thin.  He is not taking any Imodium.  As result, he is having dehydration.  Frequent tachycardia.  Also having dry mouth.  He is eating regular diet.  Incision is healing well.  No issues with pouching.  12/15/2020:  Feels better after getting IV fluids  "last week.  Started on Imodium.  Bowel movements are slightly thicker.  Complains of difficulty with gas.  Denies drinking carbonated beverages or using a straw.  No issues with his incision   - plan for Imodium 10mg 4 times a day.  12/29/2020: taking fiberCon BID with good results.  No longer using imodium.  Stool has thickened.  Feels less dehydrated.  Abdominal pain and bloating have improved. Overall pleased.      Review of Systems:  Review of Systems   Constitutional: Negative for chills, diaphoresis, fever, malaise/fatigue and weight loss.   HENT: Negative for congestion.    Respiratory: Negative for shortness of breath.    Cardiovascular: Negative for chest pain and leg swelling.   Gastrointestinal: Negative for abdominal pain, blood in stool, constipation, diarrhea, nausea and vomiting.   Genitourinary: Negative for dysuria.   Musculoskeletal: Negative for back pain and myalgias.   Skin: Negative for rash.   Neurological: Negative for dizziness, sensory change and weakness.   Endo/Heme/Allergies: Does not bruise/bleed easily.   Psychiatric/Behavioral: Negative for depression.       OBJECTIVE:     Vital Signs (Most Recent)  BP (!) 155/89 (BP Location: Left arm, Patient Position: Sitting, BP Method: Large (Automatic))   Pulse 85   Ht 6' 2" (1.88 m)   Wt 89.9 kg (198 lb 3.2 oz)   BMI 25.45 kg/m²     Physical Exam:  General: White male in no distress   Neuro: alert and oriented x 4.  Moves all extremities.     HEENT: no icterus.  Trachea midline  Respiratory: respirations are even and unlabored  Cardiac: regular rate  Abdomen:  Midline laparotomy is well-healed.  Ileostomy in the right upper quadrant is pink and protrudes above the level of skin.  No hernia.  Extremities: Warm dry and intact  Skin: no rashes  Anorectal:  None    Labs: H/H = 12/38.  Cr = 1.4.  Albumin of 4.5    Imaging:    CT from 07/19/2019 personally reviewed and demonstrates parastomal hernia in the right lower quadrant colostomy showing " small-bowel obstruction/strangulation with dilatation proximally to the loop and small bowel fecalization.    CT 09/25/2020:    - Postsurgical bowel changes with right lower ostomy noting nonobstructed fat and bowel containing parastomal hernia.    - Fluid distended loops of proximal bowel in the left abdomen with nearby small bowel feces sign could relate to component of oral contrast bolus with delayed transit.  Additional consideration to include sequela of early partial small bowel obstruction with possible area of transition in the left upper abdomen.     Small-bowel follow-through 10/26/2020: Small duodenal diverticulum.  Otherwise, normal small bowel follow through evaluation without any evidence of delayed small bowel transit, obstruction, or stricture.    ASSESSMENT/PLAN:     There are no diagnoses linked to this encounter.    70-year-old gentleman with ulcerative colitis status post total procto colectomy and end ileostomy in 1985.  Since procedure, he has had issues with intermittent dehydration resulting in syncope secondary to high output.  This has been followed by intermittent small-bowel obstruction requiring hospitalization x 3.  He now has developed food fear secondary to chronic partial small-bowel obstructions.  Additionally, he has a moderate peristomal hernia that was complicated by 1 incarcerated event that spontaneously resolved.  He underwent exploratory laparotomy with extensive lysis of adhesions and sugar Spencer repair with a Phasix mesh underlay on 11/09/2020.  His postoperative course was complicated by delayed return of bowel function.    He is now overall doing well.  Having regular ostomy function.  No issues with pouching.  Bowel movements have thickened with Fibercon.  He can follow up with me with any future concerns or issues.            CED Haley MD  Staff Surgeon  Colon & Rectal Surgery

## 2021-01-04 ENCOUNTER — OFFICE VISIT (OUTPATIENT)
Dept: OTOLARYNGOLOGY | Facility: CLINIC | Age: 71
End: 2021-01-04
Payer: MEDICARE

## 2021-01-04 VITALS
WEIGHT: 206.38 LBS | BODY MASS INDEX: 26.49 KG/M2 | HEART RATE: 80 BPM | TEMPERATURE: 99 F | SYSTOLIC BLOOD PRESSURE: 126 MMHG | DIASTOLIC BLOOD PRESSURE: 81 MMHG

## 2021-01-04 DIAGNOSIS — K11.8 PAROTID MASS: Primary | ICD-10-CM

## 2021-01-04 PROCEDURE — 3008F BODY MASS INDEX DOCD: CPT | Mod: HCNC,CPTII,S$GLB, | Performed by: NURSE PRACTITIONER

## 2021-01-04 PROCEDURE — 3008F PR BODY MASS INDEX (BMI) DOCUMENTED: ICD-10-PCS | Mod: HCNC,CPTII,S$GLB, | Performed by: NURSE PRACTITIONER

## 2021-01-04 PROCEDURE — 3288F PR FALLS RISK ASSESSMENT DOCUMENTED: ICD-10-PCS | Mod: HCNC,CPTII,S$GLB, | Performed by: NURSE PRACTITIONER

## 2021-01-04 PROCEDURE — 3288F FALL RISK ASSESSMENT DOCD: CPT | Mod: HCNC,CPTII,S$GLB, | Performed by: NURSE PRACTITIONER

## 2021-01-04 PROCEDURE — 99024 PR POST-OP FOLLOW-UP VISIT: ICD-10-PCS | Mod: HCNC,S$GLB,, | Performed by: NURSE PRACTITIONER

## 2021-01-04 PROCEDURE — 99999 PR PBB SHADOW E&M-EST. PATIENT-LVL III: CPT | Mod: PBBFAC,HCNC,, | Performed by: NURSE PRACTITIONER

## 2021-01-04 PROCEDURE — 1126F PR PAIN SEVERITY QUANTIFIED, NO PAIN PRESENT: ICD-10-PCS | Mod: HCNC,S$GLB,, | Performed by: NURSE PRACTITIONER

## 2021-01-04 PROCEDURE — 1126F AMNT PAIN NOTED NONE PRSNT: CPT | Mod: HCNC,S$GLB,, | Performed by: NURSE PRACTITIONER

## 2021-01-04 PROCEDURE — 1101F PT FALLS ASSESS-DOCD LE1/YR: CPT | Mod: HCNC,CPTII,S$GLB, | Performed by: NURSE PRACTITIONER

## 2021-01-04 PROCEDURE — 1101F PR PT FALLS ASSESS DOC 0-1 FALLS W/OUT INJ PAST YR: ICD-10-PCS | Mod: HCNC,CPTII,S$GLB, | Performed by: NURSE PRACTITIONER

## 2021-01-04 PROCEDURE — 99024 POSTOP FOLLOW-UP VISIT: CPT | Mod: HCNC,S$GLB,, | Performed by: NURSE PRACTITIONER

## 2021-01-04 PROCEDURE — 99999 PR PBB SHADOW E&M-EST. PATIENT-LVL III: ICD-10-PCS | Mod: PBBFAC,HCNC,, | Performed by: NURSE PRACTITIONER

## 2021-01-12 ENCOUNTER — OFFICE VISIT (OUTPATIENT)
Dept: DERMATOLOGY | Facility: CLINIC | Age: 71
End: 2021-01-12
Payer: MEDICARE

## 2021-01-12 ENCOUNTER — IMMUNIZATION (OUTPATIENT)
Dept: PRIMARY CARE CLINIC | Facility: CLINIC | Age: 71
End: 2021-01-12
Payer: MEDICARE

## 2021-01-12 DIAGNOSIS — L57.0 MULTIPLE ACTINIC KERATOSES: ICD-10-CM

## 2021-01-12 DIAGNOSIS — Z85.828 HISTORY OF NONMELANOMA SKIN CANCER: ICD-10-CM

## 2021-01-12 DIAGNOSIS — Z23 NEED FOR VACCINATION: ICD-10-CM

## 2021-01-12 DIAGNOSIS — D48.5 NEOPLASM OF UNCERTAIN BEHAVIOR OF SKIN: Primary | ICD-10-CM

## 2021-01-12 PROCEDURE — 3288F FALL RISK ASSESSMENT DOCD: CPT | Mod: HCNC,CPTII,S$GLB, | Performed by: PATHOLOGY

## 2021-01-12 PROCEDURE — 11102 TANGNTL BX SKIN SINGLE LES: CPT | Mod: HCNC,S$GLB,, | Performed by: PATHOLOGY

## 2021-01-12 PROCEDURE — 1126F AMNT PAIN NOTED NONE PRSNT: CPT | Mod: HCNC,S$GLB,, | Performed by: PATHOLOGY

## 2021-01-12 PROCEDURE — 99999 PR PBB SHADOW E&M-EST. PATIENT-LVL II: CPT | Mod: PBBFAC,HCNC,, | Performed by: PATHOLOGY

## 2021-01-12 PROCEDURE — 99999 PR PBB SHADOW E&M-EST. PATIENT-LVL II: ICD-10-PCS | Mod: PBBFAC,HCNC,, | Performed by: PATHOLOGY

## 2021-01-12 PROCEDURE — 99213 PR OFFICE/OUTPT VISIT, EST, LEVL III, 20-29 MIN: ICD-10-PCS | Mod: 25,HCNC,S$GLB, | Performed by: PATHOLOGY

## 2021-01-12 PROCEDURE — 88305 TISSUE EXAM BY PATHOLOGIST: CPT | Mod: 59,HCNC | Performed by: PATHOLOGY

## 2021-01-12 PROCEDURE — 1101F PT FALLS ASSESS-DOCD LE1/YR: CPT | Mod: HCNC,CPTII,S$GLB, | Performed by: PATHOLOGY

## 2021-01-12 PROCEDURE — 88305 TISSUE EXAM BY PATHOLOGIST: CPT | Mod: 26,HCNC,, | Performed by: PATHOLOGY

## 2021-01-12 PROCEDURE — 1159F PR MEDICATION LIST DOCUMENTED IN MEDICAL RECORD: ICD-10-PCS | Mod: HCNC,S$GLB,, | Performed by: PATHOLOGY

## 2021-01-12 PROCEDURE — 88305 TISSUE EXAM BY PATHOLOGIST: ICD-10-PCS | Mod: 26,HCNC,, | Performed by: PATHOLOGY

## 2021-01-12 PROCEDURE — 11103 PR TANGENTIAL BIOPSY, SKIN, EA ADDTL LESION: ICD-10-PCS | Mod: HCNC,S$GLB,, | Performed by: PATHOLOGY

## 2021-01-12 PROCEDURE — 99213 OFFICE O/P EST LOW 20 MIN: CPT | Mod: 25,HCNC,S$GLB, | Performed by: PATHOLOGY

## 2021-01-12 PROCEDURE — 11103 TANGNTL BX SKIN EA SEP/ADDL: CPT | Mod: HCNC,S$GLB,, | Performed by: PATHOLOGY

## 2021-01-12 PROCEDURE — 1159F MED LIST DOCD IN RCRD: CPT | Mod: HCNC,S$GLB,, | Performed by: PATHOLOGY

## 2021-01-12 PROCEDURE — 91300 COVID-19, MRNA, LNP-S, PF, 30 MCG/0.3 ML DOSE VACCINE: CPT | Mod: HCNC,PBBFAC | Performed by: FAMILY MEDICINE

## 2021-01-12 PROCEDURE — 3288F PR FALLS RISK ASSESSMENT DOCUMENTED: ICD-10-PCS | Mod: HCNC,CPTII,S$GLB, | Performed by: PATHOLOGY

## 2021-01-12 PROCEDURE — 1101F PR PT FALLS ASSESS DOC 0-1 FALLS W/OUT INJ PAST YR: ICD-10-PCS | Mod: HCNC,CPTII,S$GLB, | Performed by: PATHOLOGY

## 2021-01-12 PROCEDURE — 11102 PR TANGENTIAL BIOPSY, SKIN, SINGLE LESION: ICD-10-PCS | Mod: HCNC,S$GLB,, | Performed by: PATHOLOGY

## 2021-01-12 PROCEDURE — 1126F PR PAIN SEVERITY QUANTIFIED, NO PAIN PRESENT: ICD-10-PCS | Mod: HCNC,S$GLB,, | Performed by: PATHOLOGY

## 2021-01-14 LAB
FINAL PATHOLOGIC DIAGNOSIS: NORMAL
GROSS: NORMAL
MICROSCOPIC EXAM: NORMAL

## 2021-01-15 ENCOUNTER — OFFICE VISIT (OUTPATIENT)
Dept: PRIMARY CARE CLINIC | Facility: CLINIC | Age: 71
End: 2021-01-15
Payer: MEDICARE

## 2021-01-15 VITALS
BODY MASS INDEX: 26.44 KG/M2 | OXYGEN SATURATION: 96 % | HEART RATE: 64 BPM | WEIGHT: 206 LBS | SYSTOLIC BLOOD PRESSURE: 122 MMHG | TEMPERATURE: 99 F | HEIGHT: 74 IN | RESPIRATION RATE: 18 BRPM | DIASTOLIC BLOOD PRESSURE: 80 MMHG

## 2021-01-15 DIAGNOSIS — N18.2 TYPE 2 DIABETES MELLITUS WITH STAGE 2 CHRONIC KIDNEY DISEASE, WITHOUT LONG-TERM CURRENT USE OF INSULIN: ICD-10-CM

## 2021-01-15 DIAGNOSIS — I10 ESSENTIAL HYPERTENSION: ICD-10-CM

## 2021-01-15 DIAGNOSIS — E11.22 TYPE 2 DIABETES MELLITUS WITH STAGE 2 CHRONIC KIDNEY DISEASE, WITHOUT LONG-TERM CURRENT USE OF INSULIN: ICD-10-CM

## 2021-01-15 DIAGNOSIS — M10.9 ARTHRITIS OF RIGHT WRIST DUE TO GOUT: Primary | ICD-10-CM

## 2021-01-15 PROCEDURE — 1101F PT FALLS ASSESS-DOCD LE1/YR: CPT | Mod: HCNC,CPTII,S$GLB, | Performed by: INTERNAL MEDICINE

## 2021-01-15 PROCEDURE — 1159F PR MEDICATION LIST DOCUMENTED IN MEDICAL RECORD: ICD-10-PCS | Mod: HCNC,S$GLB,, | Performed by: INTERNAL MEDICINE

## 2021-01-15 PROCEDURE — 96372 THER/PROPH/DIAG INJ SC/IM: CPT | Mod: HCNC,S$GLB,, | Performed by: INTERNAL MEDICINE

## 2021-01-15 PROCEDURE — 3074F SYST BP LT 130 MM HG: CPT | Mod: HCNC,CPTII,S$GLB, | Performed by: INTERNAL MEDICINE

## 2021-01-15 PROCEDURE — 99213 OFFICE O/P EST LOW 20 MIN: CPT | Mod: HCNC,25,S$GLB, | Performed by: INTERNAL MEDICINE

## 2021-01-15 PROCEDURE — 1125F PR PAIN SEVERITY QUANTIFIED, PAIN PRESENT: ICD-10-PCS | Mod: HCNC,S$GLB,, | Performed by: INTERNAL MEDICINE

## 2021-01-15 PROCEDURE — 3074F PR MOST RECENT SYSTOLIC BLOOD PRESSURE < 130 MM HG: ICD-10-PCS | Mod: HCNC,CPTII,S$GLB, | Performed by: INTERNAL MEDICINE

## 2021-01-15 PROCEDURE — 1101F PR PT FALLS ASSESS DOC 0-1 FALLS W/OUT INJ PAST YR: ICD-10-PCS | Mod: HCNC,CPTII,S$GLB, | Performed by: INTERNAL MEDICINE

## 2021-01-15 PROCEDURE — 3079F DIAST BP 80-89 MM HG: CPT | Mod: HCNC,CPTII,S$GLB, | Performed by: INTERNAL MEDICINE

## 2021-01-15 PROCEDURE — 99213 PR OFFICE/OUTPT VISIT, EST, LEVL III, 20-29 MIN: ICD-10-PCS | Mod: HCNC,25,S$GLB, | Performed by: INTERNAL MEDICINE

## 2021-01-15 PROCEDURE — 3044F HG A1C LEVEL LT 7.0%: CPT | Mod: HCNC,CPTII,S$GLB, | Performed by: INTERNAL MEDICINE

## 2021-01-15 PROCEDURE — 3288F FALL RISK ASSESSMENT DOCD: CPT | Mod: HCNC,CPTII,S$GLB, | Performed by: INTERNAL MEDICINE

## 2021-01-15 PROCEDURE — 96372 PR INJECTION,THERAP/PROPH/DIAG2ST, IM OR SUBCUT: ICD-10-PCS | Mod: HCNC,S$GLB,, | Performed by: INTERNAL MEDICINE

## 2021-01-15 PROCEDURE — 3079F PR MOST RECENT DIASTOLIC BLOOD PRESSURE 80-89 MM HG: ICD-10-PCS | Mod: HCNC,CPTII,S$GLB, | Performed by: INTERNAL MEDICINE

## 2021-01-15 PROCEDURE — 3008F PR BODY MASS INDEX (BMI) DOCUMENTED: ICD-10-PCS | Mod: HCNC,CPTII,S$GLB, | Performed by: INTERNAL MEDICINE

## 2021-01-15 PROCEDURE — 1159F MED LIST DOCD IN RCRD: CPT | Mod: HCNC,S$GLB,, | Performed by: INTERNAL MEDICINE

## 2021-01-15 PROCEDURE — 3044F PR MOST RECENT HEMOGLOBIN A1C LEVEL <7.0%: ICD-10-PCS | Mod: HCNC,CPTII,S$GLB, | Performed by: INTERNAL MEDICINE

## 2021-01-15 PROCEDURE — 3288F PR FALLS RISK ASSESSMENT DOCUMENTED: ICD-10-PCS | Mod: HCNC,CPTII,S$GLB, | Performed by: INTERNAL MEDICINE

## 2021-01-15 PROCEDURE — 99499 UNLISTED E&M SERVICE: CPT | Mod: S$GLB,,, | Performed by: INTERNAL MEDICINE

## 2021-01-15 PROCEDURE — 1125F AMNT PAIN NOTED PAIN PRSNT: CPT | Mod: HCNC,S$GLB,, | Performed by: INTERNAL MEDICINE

## 2021-01-15 PROCEDURE — 99999 PR PBB SHADOW E&M-EST. PATIENT-LVL V: CPT | Mod: PBBFAC,HCNC,, | Performed by: INTERNAL MEDICINE

## 2021-01-15 PROCEDURE — 3008F BODY MASS INDEX DOCD: CPT | Mod: HCNC,CPTII,S$GLB, | Performed by: INTERNAL MEDICINE

## 2021-01-15 PROCEDURE — 99499 RISK ADDL DX/OHS AUDIT: ICD-10-PCS | Mod: S$GLB,,, | Performed by: INTERNAL MEDICINE

## 2021-01-15 PROCEDURE — 99999 PR PBB SHADOW E&M-EST. PATIENT-LVL V: ICD-10-PCS | Mod: PBBFAC,HCNC,, | Performed by: INTERNAL MEDICINE

## 2021-01-15 RX ORDER — COLCHICINE 0.6 MG/1
0.6 TABLET ORAL 2 TIMES DAILY PRN
Qty: 60 TABLET | Refills: 3 | Status: SHIPPED | OUTPATIENT
Start: 2021-01-15 | End: 2021-04-01 | Stop reason: CLARIF

## 2021-01-15 RX ORDER — CHLORPHENIRAMINE/PSEUDOEPHED 4 MG-60 MG
3 TABLET ORAL
COMMUNITY
Start: 2020-12-17 | End: 2023-08-01

## 2021-01-15 RX ORDER — TRIAMCINOLONE ACETONIDE 40 MG/ML
60 INJECTION, SUSPENSION INTRA-ARTICULAR; INTRAMUSCULAR ONCE
Status: COMPLETED | OUTPATIENT
Start: 2021-01-15 | End: 2021-01-15

## 2021-01-15 RX ORDER — OXYCODONE HYDROCHLORIDE 5 MG/1
TABLET ORAL
COMMUNITY
Start: 2020-12-07 | End: 2021-05-31 | Stop reason: CLARIF

## 2021-01-15 RX ADMIN — TRIAMCINOLONE ACETONIDE 60 MG: 40 INJECTION, SUSPENSION INTRA-ARTICULAR; INTRAMUSCULAR at 02:01

## 2021-01-20 ENCOUNTER — OFFICE VISIT (OUTPATIENT)
Dept: PRIMARY CARE CLINIC | Facility: CLINIC | Age: 71
End: 2021-01-20
Payer: MEDICARE

## 2021-01-20 ENCOUNTER — TELEPHONE (OUTPATIENT)
Dept: PRIMARY CARE CLINIC | Facility: CLINIC | Age: 71
End: 2021-01-20

## 2021-01-20 VITALS
OXYGEN SATURATION: 98 % | BODY MASS INDEX: 25.95 KG/M2 | DIASTOLIC BLOOD PRESSURE: 70 MMHG | HEIGHT: 74 IN | WEIGHT: 202.19 LBS | SYSTOLIC BLOOD PRESSURE: 134 MMHG | RESPIRATION RATE: 18 BRPM | HEART RATE: 78 BPM | TEMPERATURE: 98 F

## 2021-01-20 DIAGNOSIS — J06.9 URI WITH COUGH AND CONGESTION: Primary | ICD-10-CM

## 2021-01-20 DIAGNOSIS — I10 ESSENTIAL HYPERTENSION: ICD-10-CM

## 2021-01-20 PROCEDURE — 1126F PR PAIN SEVERITY QUANTIFIED, NO PAIN PRESENT: ICD-10-PCS | Mod: HCNC,S$GLB,, | Performed by: INTERNAL MEDICINE

## 2021-01-20 PROCEDURE — 99499 RISK ADDL DX/OHS AUDIT: ICD-10-PCS | Mod: S$GLB,,, | Performed by: INTERNAL MEDICINE

## 2021-01-20 PROCEDURE — 3075F SYST BP GE 130 - 139MM HG: CPT | Mod: HCNC,CPTII,S$GLB, | Performed by: INTERNAL MEDICINE

## 2021-01-20 PROCEDURE — 3078F DIAST BP <80 MM HG: CPT | Mod: HCNC,CPTII,S$GLB, | Performed by: INTERNAL MEDICINE

## 2021-01-20 PROCEDURE — 1101F PR PT FALLS ASSESS DOC 0-1 FALLS W/OUT INJ PAST YR: ICD-10-PCS | Mod: HCNC,CPTII,S$GLB, | Performed by: INTERNAL MEDICINE

## 2021-01-20 PROCEDURE — 99499 UNLISTED E&M SERVICE: CPT | Mod: S$GLB,,, | Performed by: INTERNAL MEDICINE

## 2021-01-20 PROCEDURE — 96372 PR INJECTION,THERAP/PROPH/DIAG2ST, IM OR SUBCUT: ICD-10-PCS | Mod: HCNC,S$GLB,, | Performed by: INTERNAL MEDICINE

## 2021-01-20 PROCEDURE — 1159F PR MEDICATION LIST DOCUMENTED IN MEDICAL RECORD: ICD-10-PCS | Mod: HCNC,S$GLB,, | Performed by: INTERNAL MEDICINE

## 2021-01-20 PROCEDURE — 3008F BODY MASS INDEX DOCD: CPT | Mod: HCNC,CPTII,S$GLB, | Performed by: INTERNAL MEDICINE

## 2021-01-20 PROCEDURE — 1159F MED LIST DOCD IN RCRD: CPT | Mod: HCNC,S$GLB,, | Performed by: INTERNAL MEDICINE

## 2021-01-20 PROCEDURE — 1126F AMNT PAIN NOTED NONE PRSNT: CPT | Mod: HCNC,S$GLB,, | Performed by: INTERNAL MEDICINE

## 2021-01-20 PROCEDURE — 99999 PR PBB SHADOW E&M-EST. PATIENT-LVL III: ICD-10-PCS | Mod: PBBFAC,HCNC,, | Performed by: INTERNAL MEDICINE

## 2021-01-20 PROCEDURE — 3075F PR MOST RECENT SYSTOLIC BLOOD PRESS GE 130-139MM HG: ICD-10-PCS | Mod: HCNC,CPTII,S$GLB, | Performed by: INTERNAL MEDICINE

## 2021-01-20 PROCEDURE — 96372 THER/PROPH/DIAG INJ SC/IM: CPT | Mod: HCNC,S$GLB,, | Performed by: INTERNAL MEDICINE

## 2021-01-20 PROCEDURE — 3078F PR MOST RECENT DIASTOLIC BLOOD PRESSURE < 80 MM HG: ICD-10-PCS | Mod: HCNC,CPTII,S$GLB, | Performed by: INTERNAL MEDICINE

## 2021-01-20 PROCEDURE — 3288F PR FALLS RISK ASSESSMENT DOCUMENTED: ICD-10-PCS | Mod: HCNC,CPTII,S$GLB, | Performed by: INTERNAL MEDICINE

## 2021-01-20 PROCEDURE — 99213 PR OFFICE/OUTPT VISIT, EST, LEVL III, 20-29 MIN: ICD-10-PCS | Mod: HCNC,25,S$GLB, | Performed by: INTERNAL MEDICINE

## 2021-01-20 PROCEDURE — 3008F PR BODY MASS INDEX (BMI) DOCUMENTED: ICD-10-PCS | Mod: HCNC,CPTII,S$GLB, | Performed by: INTERNAL MEDICINE

## 2021-01-20 PROCEDURE — 99213 OFFICE O/P EST LOW 20 MIN: CPT | Mod: HCNC,25,S$GLB, | Performed by: INTERNAL MEDICINE

## 2021-01-20 PROCEDURE — 99999 PR PBB SHADOW E&M-EST. PATIENT-LVL III: CPT | Mod: PBBFAC,HCNC,, | Performed by: INTERNAL MEDICINE

## 2021-01-20 PROCEDURE — 3288F FALL RISK ASSESSMENT DOCD: CPT | Mod: HCNC,CPTII,S$GLB, | Performed by: INTERNAL MEDICINE

## 2021-01-20 PROCEDURE — 1101F PT FALLS ASSESS-DOCD LE1/YR: CPT | Mod: HCNC,CPTII,S$GLB, | Performed by: INTERNAL MEDICINE

## 2021-01-20 RX ORDER — AZITHROMYCIN 250 MG/1
TABLET, FILM COATED ORAL
Qty: 6 TABLET | Refills: 0 | Status: SHIPPED | OUTPATIENT
Start: 2021-01-20 | End: 2021-01-20 | Stop reason: SDUPTHER

## 2021-01-20 RX ORDER — TRIAMCINOLONE ACETONIDE 40 MG/ML
40 INJECTION, SUSPENSION INTRA-ARTICULAR; INTRAMUSCULAR ONCE
Status: COMPLETED | OUTPATIENT
Start: 2021-01-20 | End: 2021-01-20

## 2021-01-20 RX ORDER — LEVOCETIRIZINE DIHYDROCHLORIDE 5 MG/1
5 TABLET, FILM COATED ORAL NIGHTLY
Qty: 30 TABLET | Refills: 0 | Status: SHIPPED | OUTPATIENT
Start: 2021-01-20 | End: 2021-04-07 | Stop reason: CLARIF

## 2021-01-20 RX ORDER — CODEINE PHOSPHATE AND GUAIFENESIN 10; 100 MG/5ML; MG/5ML
5 SOLUTION ORAL EVERY 6 HOURS PRN
Qty: 120 ML | Refills: 0 | Status: SHIPPED | OUTPATIENT
Start: 2021-01-20 | End: 2021-01-20 | Stop reason: SDUPTHER

## 2021-01-20 RX ORDER — CODEINE PHOSPHATE AND GUAIFENESIN 10; 100 MG/5ML; MG/5ML
5 SOLUTION ORAL EVERY 6 HOURS PRN
Qty: 120 ML | Refills: 0 | Status: SHIPPED | OUTPATIENT
Start: 2021-01-20 | End: 2021-04-01 | Stop reason: CLARIF

## 2021-01-20 RX ORDER — LEVOCETIRIZINE DIHYDROCHLORIDE 5 MG/1
5 TABLET, FILM COATED ORAL NIGHTLY
Qty: 30 TABLET | Refills: 0 | Status: SHIPPED | OUTPATIENT
Start: 2021-01-20 | End: 2021-01-20 | Stop reason: SDUPTHER

## 2021-01-20 RX ORDER — AZITHROMYCIN 250 MG/1
TABLET, FILM COATED ORAL
Qty: 6 TABLET | Refills: 0 | Status: SHIPPED | OUTPATIENT
Start: 2021-01-20 | End: 2021-01-24

## 2021-01-20 RX ADMIN — TRIAMCINOLONE ACETONIDE 40 MG: 40 INJECTION, SUSPENSION INTRA-ARTICULAR; INTRAMUSCULAR at 02:01

## 2021-02-02 ENCOUNTER — IMMUNIZATION (OUTPATIENT)
Dept: PRIMARY CARE CLINIC | Facility: CLINIC | Age: 71
End: 2021-02-02
Payer: MEDICARE

## 2021-02-02 DIAGNOSIS — Z23 NEED FOR VACCINATION: Primary | ICD-10-CM

## 2021-02-02 PROCEDURE — 0002A COVID-19, MRNA, LNP-S, PF, 30 MCG/0.3 ML DOSE VACCINE: CPT | Mod: HCNC,PBBFAC | Performed by: EMERGENCY MEDICINE

## 2021-02-02 PROCEDURE — 91300 COVID-19, MRNA, LNP-S, PF, 30 MCG/0.3 ML DOSE VACCINE: CPT | Mod: HCNC,PBBFAC | Performed by: EMERGENCY MEDICINE

## 2021-02-09 ENCOUNTER — OFFICE VISIT (OUTPATIENT)
Dept: OTOLARYNGOLOGY | Facility: CLINIC | Age: 71
End: 2021-02-09
Payer: MEDICARE

## 2021-02-09 VITALS
DIASTOLIC BLOOD PRESSURE: 84 MMHG | WEIGHT: 207 LBS | HEART RATE: 70 BPM | SYSTOLIC BLOOD PRESSURE: 133 MMHG | BODY MASS INDEX: 26.58 KG/M2 | TEMPERATURE: 97 F

## 2021-02-09 DIAGNOSIS — K11.8 PAROTID MASS: Primary | ICD-10-CM

## 2021-02-09 PROCEDURE — 3008F BODY MASS INDEX DOCD: CPT | Mod: CPTII,S$GLB,, | Performed by: NURSE PRACTITIONER

## 2021-02-09 PROCEDURE — 1101F PR PT FALLS ASSESS DOC 0-1 FALLS W/OUT INJ PAST YR: ICD-10-PCS | Mod: CPTII,S$GLB,, | Performed by: NURSE PRACTITIONER

## 2021-02-09 PROCEDURE — 99024 PR POST-OP FOLLOW-UP VISIT: ICD-10-PCS | Mod: S$GLB,,, | Performed by: NURSE PRACTITIONER

## 2021-02-09 PROCEDURE — 99999 PR PBB SHADOW E&M-EST. PATIENT-LVL IV: ICD-10-PCS | Mod: PBBFAC,,, | Performed by: NURSE PRACTITIONER

## 2021-02-09 PROCEDURE — 1125F AMNT PAIN NOTED PAIN PRSNT: CPT | Mod: S$GLB,,, | Performed by: NURSE PRACTITIONER

## 2021-02-09 PROCEDURE — 1125F PR PAIN SEVERITY QUANTIFIED, PAIN PRESENT: ICD-10-PCS | Mod: S$GLB,,, | Performed by: NURSE PRACTITIONER

## 2021-02-09 PROCEDURE — 3288F FALL RISK ASSESSMENT DOCD: CPT | Mod: CPTII,S$GLB,, | Performed by: NURSE PRACTITIONER

## 2021-02-09 PROCEDURE — 3008F PR BODY MASS INDEX (BMI) DOCUMENTED: ICD-10-PCS | Mod: CPTII,S$GLB,, | Performed by: NURSE PRACTITIONER

## 2021-02-09 PROCEDURE — 3288F PR FALLS RISK ASSESSMENT DOCUMENTED: ICD-10-PCS | Mod: CPTII,S$GLB,, | Performed by: NURSE PRACTITIONER

## 2021-02-09 PROCEDURE — 99024 POSTOP FOLLOW-UP VISIT: CPT | Mod: S$GLB,,, | Performed by: NURSE PRACTITIONER

## 2021-02-09 PROCEDURE — 99999 PR PBB SHADOW E&M-EST. PATIENT-LVL IV: CPT | Mod: PBBFAC,,, | Performed by: NURSE PRACTITIONER

## 2021-02-09 PROCEDURE — 1101F PT FALLS ASSESS-DOCD LE1/YR: CPT | Mod: CPTII,S$GLB,, | Performed by: NURSE PRACTITIONER

## 2021-02-11 ENCOUNTER — OFFICE VISIT (OUTPATIENT)
Dept: PRIMARY CARE CLINIC | Facility: CLINIC | Age: 71
End: 2021-02-11
Payer: MEDICARE

## 2021-02-11 VITALS
OXYGEN SATURATION: 99 % | WEIGHT: 202 LBS | TEMPERATURE: 98 F | HEIGHT: 74 IN | HEART RATE: 94 BPM | DIASTOLIC BLOOD PRESSURE: 84 MMHG | SYSTOLIC BLOOD PRESSURE: 132 MMHG | RESPIRATION RATE: 18 BRPM | BODY MASS INDEX: 25.93 KG/M2

## 2021-02-11 DIAGNOSIS — N18.2 CHRONIC RENAL IMPAIRMENT, STAGE 2 (MILD): ICD-10-CM

## 2021-02-11 DIAGNOSIS — G63 POLYNEUROPATHY ASSOCIATED WITH UNDERLYING DISEASE: ICD-10-CM

## 2021-02-11 DIAGNOSIS — Z93.2 ILEOSTOMY IN PLACE: ICD-10-CM

## 2021-02-11 DIAGNOSIS — E78.5 HYPERLIPIDEMIA, UNSPECIFIED HYPERLIPIDEMIA TYPE: ICD-10-CM

## 2021-02-11 DIAGNOSIS — I70.0 AORTIC ATHEROSCLEROSIS: ICD-10-CM

## 2021-02-11 DIAGNOSIS — R25.2 LEG CRAMPS: Primary | ICD-10-CM

## 2021-02-11 DIAGNOSIS — C61 PROSTATE CANCER: ICD-10-CM

## 2021-02-11 PROCEDURE — 99999 PR PBB SHADOW E&M-EST. PATIENT-LVL III: ICD-10-PCS | Mod: PBBFAC,,, | Performed by: INTERNAL MEDICINE

## 2021-02-11 PROCEDURE — 3008F PR BODY MASS INDEX (BMI) DOCUMENTED: ICD-10-PCS | Mod: CPTII,S$GLB,, | Performed by: INTERNAL MEDICINE

## 2021-02-11 PROCEDURE — 3079F DIAST BP 80-89 MM HG: CPT | Mod: CPTII,S$GLB,, | Performed by: INTERNAL MEDICINE

## 2021-02-11 PROCEDURE — 3075F SYST BP GE 130 - 139MM HG: CPT | Mod: CPTII,S$GLB,, | Performed by: INTERNAL MEDICINE

## 2021-02-11 PROCEDURE — 99999 PR PBB SHADOW E&M-EST. PATIENT-LVL III: CPT | Mod: PBBFAC,,, | Performed by: INTERNAL MEDICINE

## 2021-02-11 PROCEDURE — 1125F PR PAIN SEVERITY QUANTIFIED, PAIN PRESENT: ICD-10-PCS | Mod: S$GLB,,, | Performed by: INTERNAL MEDICINE

## 2021-02-11 PROCEDURE — 3075F PR MOST RECENT SYSTOLIC BLOOD PRESS GE 130-139MM HG: ICD-10-PCS | Mod: CPTII,S$GLB,, | Performed by: INTERNAL MEDICINE

## 2021-02-11 PROCEDURE — 99213 OFFICE O/P EST LOW 20 MIN: CPT | Mod: S$GLB,,, | Performed by: INTERNAL MEDICINE

## 2021-02-11 PROCEDURE — 1125F AMNT PAIN NOTED PAIN PRSNT: CPT | Mod: S$GLB,,, | Performed by: INTERNAL MEDICINE

## 2021-02-11 PROCEDURE — 99499 UNLISTED E&M SERVICE: CPT | Mod: HCNC,S$GLB,, | Performed by: INTERNAL MEDICINE

## 2021-02-11 PROCEDURE — 3008F BODY MASS INDEX DOCD: CPT | Mod: CPTII,S$GLB,, | Performed by: INTERNAL MEDICINE

## 2021-02-11 PROCEDURE — 99213 PR OFFICE/OUTPT VISIT, EST, LEVL III, 20-29 MIN: ICD-10-PCS | Mod: S$GLB,,, | Performed by: INTERNAL MEDICINE

## 2021-02-11 PROCEDURE — 99499 RISK ADDL DX/OHS AUDIT: ICD-10-PCS | Mod: HCNC,S$GLB,, | Performed by: INTERNAL MEDICINE

## 2021-02-11 PROCEDURE — 1159F MED LIST DOCD IN RCRD: CPT | Mod: S$GLB,,, | Performed by: INTERNAL MEDICINE

## 2021-02-11 PROCEDURE — 1159F PR MEDICATION LIST DOCUMENTED IN MEDICAL RECORD: ICD-10-PCS | Mod: S$GLB,,, | Performed by: INTERNAL MEDICINE

## 2021-02-11 PROCEDURE — 3079F PR MOST RECENT DIASTOLIC BLOOD PRESSURE 80-89 MM HG: ICD-10-PCS | Mod: CPTII,S$GLB,, | Performed by: INTERNAL MEDICINE

## 2021-02-11 RX ORDER — ALPRAZOLAM 0.5 MG/1
1 TABLET ORAL DAILY PRN
COMMUNITY
Start: 2021-02-08 | End: 2021-04-07 | Stop reason: CLARIF

## 2021-02-12 ENCOUNTER — TELEPHONE (OUTPATIENT)
Dept: PRIMARY CARE CLINIC | Facility: CLINIC | Age: 71
End: 2021-02-12

## 2021-02-12 ENCOUNTER — TELEPHONE (OUTPATIENT)
Dept: DIABETES | Facility: CLINIC | Age: 71
End: 2021-02-12

## 2021-02-12 DIAGNOSIS — R25.2 MUSCLE CRAMP: ICD-10-CM

## 2021-02-12 DIAGNOSIS — R25.2 MUSCLE CRAMP: Primary | ICD-10-CM

## 2021-02-12 RX ORDER — TIZANIDINE 4 MG/1
4 TABLET ORAL 2 TIMES DAILY PRN
Qty: 30 TABLET | Refills: 1 | Status: SHIPPED | OUTPATIENT
Start: 2021-02-12 | End: 2021-02-22

## 2021-02-12 RX ORDER — TIZANIDINE 4 MG/1
4 TABLET ORAL 2 TIMES DAILY PRN
Qty: 30 TABLET | Refills: 1 | Status: SHIPPED | OUTPATIENT
Start: 2021-02-12 | End: 2021-02-12

## 2021-02-23 ENCOUNTER — PATIENT MESSAGE (OUTPATIENT)
Dept: RHEUMATOLOGY | Facility: CLINIC | Age: 71
End: 2021-02-23

## 2021-03-01 ENCOUNTER — TELEPHONE (OUTPATIENT)
Dept: PRIMARY CARE CLINIC | Facility: CLINIC | Age: 71
End: 2021-03-01

## 2021-03-16 ENCOUNTER — TELEPHONE (OUTPATIENT)
Dept: PRIMARY CARE CLINIC | Facility: CLINIC | Age: 71
End: 2021-03-16

## 2021-04-05 ENCOUNTER — TELEPHONE (OUTPATIENT)
Dept: SURGERY | Facility: CLINIC | Age: 71
End: 2021-04-05

## 2021-04-05 ENCOUNTER — TELEPHONE (OUTPATIENT)
Dept: NEPHROLOGY | Facility: CLINIC | Age: 71
End: 2021-04-05

## 2021-04-05 PROBLEM — I47.20 VT (VENTRICULAR TACHYCARDIA): Status: ACTIVE | Noted: 2021-04-05

## 2021-04-06 ENCOUNTER — RESEARCH ENCOUNTER (OUTPATIENT)
Dept: RESEARCH | Facility: HOSPITAL | Age: 71
End: 2021-04-06

## 2021-04-06 ENCOUNTER — TELEPHONE (OUTPATIENT)
Dept: SURGERY | Facility: CLINIC | Age: 71
End: 2021-04-06

## 2021-04-06 ENCOUNTER — OFFICE VISIT (OUTPATIENT)
Dept: NEPHROLOGY | Facility: CLINIC | Age: 71
End: 2021-04-06
Payer: MEDICARE

## 2021-04-06 ENCOUNTER — OFFICE VISIT (OUTPATIENT)
Dept: SURGERY | Facility: CLINIC | Age: 71
End: 2021-04-06
Payer: MEDICARE

## 2021-04-06 VITALS
DIASTOLIC BLOOD PRESSURE: 92 MMHG | SYSTOLIC BLOOD PRESSURE: 176 MMHG | BODY MASS INDEX: 25.49 KG/M2 | HEART RATE: 76 BPM | HEIGHT: 74 IN | WEIGHT: 198.63 LBS

## 2021-04-06 VITALS
SYSTOLIC BLOOD PRESSURE: 150 MMHG | DIASTOLIC BLOOD PRESSURE: 98 MMHG | BODY MASS INDEX: 25.47 KG/M2 | HEIGHT: 74 IN | HEART RATE: 83 BPM | OXYGEN SATURATION: 98 % | WEIGHT: 198.44 LBS

## 2021-04-06 DIAGNOSIS — R25.2 LEG CRAMPING: ICD-10-CM

## 2021-04-06 DIAGNOSIS — E87.5 HYPERKALEMIA: ICD-10-CM

## 2021-04-06 DIAGNOSIS — K94.19 ALTERED BOWEL ELIMINATION DUE TO INTESTINAL OSTOMY: ICD-10-CM

## 2021-04-06 DIAGNOSIS — E86.0 DEHYDRATION: ICD-10-CM

## 2021-04-06 DIAGNOSIS — Z93.2 HIGH OUTPUT ILEOSTOMY: Primary | ICD-10-CM

## 2021-04-06 DIAGNOSIS — I10 HYPERTENSION, UNSPECIFIED TYPE: ICD-10-CM

## 2021-04-06 DIAGNOSIS — N18.32 STAGE 3B CHRONIC KIDNEY DISEASE: Primary | ICD-10-CM

## 2021-04-06 DIAGNOSIS — R19.8 HIGH OUTPUT ILEOSTOMY: Primary | ICD-10-CM

## 2021-04-06 PROCEDURE — 3288F PR FALLS RISK ASSESSMENT DOCUMENTED: ICD-10-PCS | Mod: CPTII,S$GLB,, | Performed by: COLON & RECTAL SURGERY

## 2021-04-06 PROCEDURE — 3288F PR FALLS RISK ASSESSMENT DOCUMENTED: ICD-10-PCS | Mod: CPTII,S$GLB,, | Performed by: NURSE PRACTITIONER

## 2021-04-06 PROCEDURE — 3008F PR BODY MASS INDEX (BMI) DOCUMENTED: ICD-10-PCS | Mod: CPTII,S$GLB,, | Performed by: COLON & RECTAL SURGERY

## 2021-04-06 PROCEDURE — 1101F PT FALLS ASSESS-DOCD LE1/YR: CPT | Mod: CPTII,S$GLB,, | Performed by: COLON & RECTAL SURGERY

## 2021-04-06 PROCEDURE — 3077F SYST BP >= 140 MM HG: CPT | Mod: CPTII,S$GLB,, | Performed by: COLON & RECTAL SURGERY

## 2021-04-06 PROCEDURE — 1125F AMNT PAIN NOTED PAIN PRSNT: CPT | Mod: S$GLB,,, | Performed by: NURSE PRACTITIONER

## 2021-04-06 PROCEDURE — 99999 PR PBB SHADOW E&M-EST. PATIENT-LVL III: ICD-10-PCS | Mod: PBBFAC,,, | Performed by: COLON & RECTAL SURGERY

## 2021-04-06 PROCEDURE — 1100F PR PT FALLS ASSESS DOC 2+ FALLS/FALL W/INJURY/YR: ICD-10-PCS | Mod: CPTII,S$GLB,, | Performed by: NURSE PRACTITIONER

## 2021-04-06 PROCEDURE — 99214 PR OFFICE/OUTPT VISIT, EST, LEVL IV, 30-39 MIN: ICD-10-PCS | Mod: S$GLB,,, | Performed by: NURSE PRACTITIONER

## 2021-04-06 PROCEDURE — 1159F MED LIST DOCD IN RCRD: CPT | Mod: S$GLB,,, | Performed by: NURSE PRACTITIONER

## 2021-04-06 PROCEDURE — 1159F MED LIST DOCD IN RCRD: CPT | Mod: S$GLB,,, | Performed by: COLON & RECTAL SURGERY

## 2021-04-06 PROCEDURE — 3008F PR BODY MASS INDEX (BMI) DOCUMENTED: ICD-10-PCS | Mod: CPTII,S$GLB,, | Performed by: NURSE PRACTITIONER

## 2021-04-06 PROCEDURE — 99999 PR PBB SHADOW E&M-EST. PATIENT-LVL III: ICD-10-PCS | Mod: PBBFAC,,, | Performed by: NURSE PRACTITIONER

## 2021-04-06 PROCEDURE — 99499 RISK ADDL DX/OHS AUDIT: ICD-10-PCS | Mod: S$GLB,,, | Performed by: NURSE PRACTITIONER

## 2021-04-06 PROCEDURE — 1125F PR PAIN SEVERITY QUANTIFIED, PAIN PRESENT: ICD-10-PCS | Mod: S$GLB,,, | Performed by: NURSE PRACTITIONER

## 2021-04-06 PROCEDURE — 99499 UNLISTED E&M SERVICE: CPT | Mod: S$GLB,,, | Performed by: NURSE PRACTITIONER

## 2021-04-06 PROCEDURE — 3288F FALL RISK ASSESSMENT DOCD: CPT | Mod: CPTII,S$GLB,, | Performed by: COLON & RECTAL SURGERY

## 2021-04-06 PROCEDURE — 99499 UNLISTED E&M SERVICE: CPT | Mod: HCNC,S$GLB,, | Performed by: COLON & RECTAL SURGERY

## 2021-04-06 PROCEDURE — 3080F PR MOST RECENT DIASTOLIC BLOOD PRESSURE >= 90 MM HG: ICD-10-PCS | Mod: CPTII,S$GLB,, | Performed by: NURSE PRACTITIONER

## 2021-04-06 PROCEDURE — 3008F BODY MASS INDEX DOCD: CPT | Mod: CPTII,S$GLB,, | Performed by: NURSE PRACTITIONER

## 2021-04-06 PROCEDURE — 1125F AMNT PAIN NOTED PAIN PRSNT: CPT | Mod: S$GLB,,, | Performed by: COLON & RECTAL SURGERY

## 2021-04-06 PROCEDURE — 3080F DIAST BP >= 90 MM HG: CPT | Mod: CPTII,S$GLB,, | Performed by: NURSE PRACTITIONER

## 2021-04-06 PROCEDURE — 1100F PTFALLS ASSESS-DOCD GE2>/YR: CPT | Mod: CPTII,S$GLB,, | Performed by: NURSE PRACTITIONER

## 2021-04-06 PROCEDURE — 99999 PR PBB SHADOW E&M-EST. PATIENT-LVL III: CPT | Mod: PBBFAC,,, | Performed by: NURSE PRACTITIONER

## 2021-04-06 PROCEDURE — 3077F SYST BP >= 140 MM HG: CPT | Mod: CPTII,S$GLB,, | Performed by: NURSE PRACTITIONER

## 2021-04-06 PROCEDURE — 3080F DIAST BP >= 90 MM HG: CPT | Mod: CPTII,S$GLB,, | Performed by: COLON & RECTAL SURGERY

## 2021-04-06 PROCEDURE — 99213 OFFICE O/P EST LOW 20 MIN: CPT | Mod: S$GLB,,, | Performed by: COLON & RECTAL SURGERY

## 2021-04-06 PROCEDURE — 1125F PR PAIN SEVERITY QUANTIFIED, PAIN PRESENT: ICD-10-PCS | Mod: S$GLB,,, | Performed by: COLON & RECTAL SURGERY

## 2021-04-06 PROCEDURE — 3288F FALL RISK ASSESSMENT DOCD: CPT | Mod: CPTII,S$GLB,, | Performed by: NURSE PRACTITIONER

## 2021-04-06 PROCEDURE — 3077F PR MOST RECENT SYSTOLIC BLOOD PRESSURE >= 140 MM HG: ICD-10-PCS | Mod: CPTII,S$GLB,, | Performed by: NURSE PRACTITIONER

## 2021-04-06 PROCEDURE — 1159F PR MEDICATION LIST DOCUMENTED IN MEDICAL RECORD: ICD-10-PCS | Mod: S$GLB,,, | Performed by: NURSE PRACTITIONER

## 2021-04-06 PROCEDURE — 99499 RISK ADDL DX/OHS AUDIT: ICD-10-PCS | Mod: HCNC,S$GLB,, | Performed by: COLON & RECTAL SURGERY

## 2021-04-06 PROCEDURE — 99213 PR OFFICE/OUTPT VISIT, EST, LEVL III, 20-29 MIN: ICD-10-PCS | Mod: S$GLB,,, | Performed by: COLON & RECTAL SURGERY

## 2021-04-06 PROCEDURE — 3008F BODY MASS INDEX DOCD: CPT | Mod: CPTII,S$GLB,, | Performed by: COLON & RECTAL SURGERY

## 2021-04-06 PROCEDURE — 3077F PR MOST RECENT SYSTOLIC BLOOD PRESSURE >= 140 MM HG: ICD-10-PCS | Mod: CPTII,S$GLB,, | Performed by: COLON & RECTAL SURGERY

## 2021-04-06 PROCEDURE — 99999 PR PBB SHADOW E&M-EST. PATIENT-LVL III: CPT | Mod: PBBFAC,,, | Performed by: COLON & RECTAL SURGERY

## 2021-04-06 PROCEDURE — 1159F PR MEDICATION LIST DOCUMENTED IN MEDICAL RECORD: ICD-10-PCS | Mod: S$GLB,,, | Performed by: COLON & RECTAL SURGERY

## 2021-04-06 PROCEDURE — 99214 OFFICE O/P EST MOD 30 MIN: CPT | Mod: S$GLB,,, | Performed by: NURSE PRACTITIONER

## 2021-04-06 PROCEDURE — 3080F PR MOST RECENT DIASTOLIC BLOOD PRESSURE >= 90 MM HG: ICD-10-PCS | Mod: CPTII,S$GLB,, | Performed by: COLON & RECTAL SURGERY

## 2021-04-06 PROCEDURE — 1101F PR PT FALLS ASSESS DOC 0-1 FALLS W/OUT INJ PAST YR: ICD-10-PCS | Mod: CPTII,S$GLB,, | Performed by: COLON & RECTAL SURGERY

## 2021-04-06 RX ORDER — LISINOPRIL 2.5 MG/1
2.5 TABLET ORAL DAILY
COMMUNITY
Start: 2021-02-18 | End: 2021-04-07 | Stop reason: CLARIF

## 2021-04-06 RX ORDER — AMIODARONE HYDROCHLORIDE 200 MG/1
200 TABLET ORAL 3 TIMES DAILY
Status: ON HOLD | COMMUNITY
Start: 2021-03-31 | End: 2022-03-11 | Stop reason: SDUPTHER

## 2021-04-06 RX ORDER — DIPHENOXYLATE HCL/ATROPINE 2.5-.025/5
5 LIQUID (ML) ORAL 4 TIMES DAILY
Qty: 600 ML | Refills: 4 | Status: SHIPPED | OUTPATIENT
Start: 2021-04-06 | End: 2021-04-07 | Stop reason: CLARIF

## 2021-04-06 RX ORDER — MIDODRINE HYDROCHLORIDE 2.5 MG/1
TABLET ORAL
Status: ON HOLD | COMMUNITY
Start: 2021-03-17 | End: 2022-03-11 | Stop reason: HOSPADM

## 2021-04-15 PROBLEM — I47.20 PAROXYSMAL VENTRICULAR TACHYCARDIA: Status: ACTIVE | Noted: 2021-04-15

## 2021-04-15 PROBLEM — I47.29 PAROXYSMAL VENTRICULAR TACHYCARDIA: Status: ACTIVE | Noted: 2021-04-15

## 2021-04-16 ENCOUNTER — TELEPHONE (OUTPATIENT)
Dept: PRIMARY CARE CLINIC | Facility: CLINIC | Age: 71
End: 2021-04-16

## 2021-04-20 ENCOUNTER — OFFICE VISIT (OUTPATIENT)
Dept: PRIMARY CARE CLINIC | Facility: CLINIC | Age: 71
End: 2021-04-20
Payer: MEDICARE

## 2021-04-20 VITALS
DIASTOLIC BLOOD PRESSURE: 80 MMHG | HEART RATE: 79 BPM | HEIGHT: 74 IN | WEIGHT: 198.44 LBS | TEMPERATURE: 98 F | SYSTOLIC BLOOD PRESSURE: 124 MMHG | BODY MASS INDEX: 25.47 KG/M2 | OXYGEN SATURATION: 98 % | RESPIRATION RATE: 18 BRPM

## 2021-04-20 DIAGNOSIS — E34.0 CARCINOID SYNDROME: ICD-10-CM

## 2021-04-20 DIAGNOSIS — G90.9 AUTONOMIC INSTABILITY: ICD-10-CM

## 2021-04-20 DIAGNOSIS — E11.42 TYPE 2 DIABETES MELLITUS WITH DIABETIC POLYNEUROPATHY, WITHOUT LONG-TERM CURRENT USE OF INSULIN: ICD-10-CM

## 2021-04-20 DIAGNOSIS — R55 SYNCOPE, UNSPECIFIED SYNCOPE TYPE: ICD-10-CM

## 2021-04-20 DIAGNOSIS — R10.9 ABDOMINAL CRAMPING: Primary | ICD-10-CM

## 2021-04-20 PROCEDURE — 1125F AMNT PAIN NOTED PAIN PRSNT: CPT | Mod: S$GLB,,, | Performed by: INTERNAL MEDICINE

## 2021-04-20 PROCEDURE — 1159F PR MEDICATION LIST DOCUMENTED IN MEDICAL RECORD: ICD-10-PCS | Mod: S$GLB,,, | Performed by: INTERNAL MEDICINE

## 2021-04-20 PROCEDURE — 1125F PR PAIN SEVERITY QUANTIFIED, PAIN PRESENT: ICD-10-PCS | Mod: S$GLB,,, | Performed by: INTERNAL MEDICINE

## 2021-04-20 PROCEDURE — 1159F MED LIST DOCD IN RCRD: CPT | Mod: S$GLB,,, | Performed by: INTERNAL MEDICINE

## 2021-04-20 PROCEDURE — 3288F PR FALLS RISK ASSESSMENT DOCUMENTED: ICD-10-PCS | Mod: CPTII,S$GLB,, | Performed by: INTERNAL MEDICINE

## 2021-04-20 PROCEDURE — 1100F PR PT FALLS ASSESS DOC 2+ FALLS/FALL W/INJURY/YR: ICD-10-PCS | Mod: CPTII,S$GLB,, | Performed by: INTERNAL MEDICINE

## 2021-04-20 PROCEDURE — 1100F PTFALLS ASSESS-DOCD GE2>/YR: CPT | Mod: CPTII,S$GLB,, | Performed by: INTERNAL MEDICINE

## 2021-04-20 PROCEDURE — 99213 PR OFFICE/OUTPT VISIT, EST, LEVL III, 20-29 MIN: ICD-10-PCS | Mod: S$GLB,,, | Performed by: INTERNAL MEDICINE

## 2021-04-20 PROCEDURE — 3288F FALL RISK ASSESSMENT DOCD: CPT | Mod: CPTII,S$GLB,, | Performed by: INTERNAL MEDICINE

## 2021-04-20 PROCEDURE — 3008F PR BODY MASS INDEX (BMI) DOCUMENTED: ICD-10-PCS | Mod: CPTII,S$GLB,, | Performed by: INTERNAL MEDICINE

## 2021-04-20 PROCEDURE — 3008F BODY MASS INDEX DOCD: CPT | Mod: CPTII,S$GLB,, | Performed by: INTERNAL MEDICINE

## 2021-04-20 PROCEDURE — 99999 PR PBB SHADOW E&M-EST. PATIENT-LVL V: CPT | Mod: PBBFAC,,, | Performed by: INTERNAL MEDICINE

## 2021-04-20 PROCEDURE — 99213 OFFICE O/P EST LOW 20 MIN: CPT | Mod: S$GLB,,, | Performed by: INTERNAL MEDICINE

## 2021-04-20 PROCEDURE — 99999 PR PBB SHADOW E&M-EST. PATIENT-LVL V: ICD-10-PCS | Mod: PBBFAC,,, | Performed by: INTERNAL MEDICINE

## 2021-04-20 RX ORDER — DIPHENOXYLATE HYDROCHLORIDE AND ATROPINE SULFATE 2.5; .025 MG/1; MG/1
1 TABLET ORAL 4 TIMES DAILY
COMMUNITY
Start: 2021-04-06 | End: 2022-04-12

## 2021-04-20 RX ORDER — DICYCLOMINE HYDROCHLORIDE 20 MG/1
20 TABLET ORAL EVERY 6 HOURS PRN
Qty: 60 TABLET | Refills: 2 | Status: SHIPPED | OUTPATIENT
Start: 2021-04-20 | End: 2021-04-20 | Stop reason: SDUPTHER

## 2021-04-20 RX ORDER — AMLODIPINE BESYLATE 2.5 MG/1
1 TABLET ORAL DAILY PRN
COMMUNITY
Start: 2021-04-06 | End: 2022-02-16 | Stop reason: ALTCHOICE

## 2021-04-20 RX ORDER — LANOLIN ALCOHOL/MO/W.PET/CERES
400 CREAM (GRAM) TOPICAL DAILY
COMMUNITY
End: 2023-02-01

## 2021-04-20 RX ORDER — DICYCLOMINE HYDROCHLORIDE 20 MG/1
20 TABLET ORAL EVERY 6 HOURS PRN
Qty: 60 TABLET | Refills: 2 | Status: SHIPPED | OUTPATIENT
Start: 2021-04-20 | End: 2021-05-18

## 2021-04-20 RX ORDER — PANTOPRAZOLE SODIUM 40 MG/1
40 TABLET, DELAYED RELEASE ORAL DAILY
COMMUNITY
End: 2023-04-04 | Stop reason: SDUPTHER

## 2021-04-29 ENCOUNTER — OFFICE VISIT (OUTPATIENT)
Dept: OTOLARYNGOLOGY | Facility: CLINIC | Age: 71
End: 2021-04-29
Payer: MEDICARE

## 2021-04-29 VITALS
WEIGHT: 195 LBS | BODY MASS INDEX: 25.04 KG/M2 | HEART RATE: 76 BPM | DIASTOLIC BLOOD PRESSURE: 89 MMHG | SYSTOLIC BLOOD PRESSURE: 144 MMHG

## 2021-04-29 DIAGNOSIS — K11.8 PAROTID MASS: Primary | ICD-10-CM

## 2021-04-29 PROCEDURE — 99213 PR OFFICE/OUTPT VISIT, EST, LEVL III, 20-29 MIN: ICD-10-PCS | Mod: S$GLB,,, | Performed by: OTOLARYNGOLOGY

## 2021-04-29 PROCEDURE — 1159F MED LIST DOCD IN RCRD: CPT | Mod: S$GLB,,, | Performed by: OTOLARYNGOLOGY

## 2021-04-29 PROCEDURE — 1125F AMNT PAIN NOTED PAIN PRSNT: CPT | Mod: S$GLB,,, | Performed by: OTOLARYNGOLOGY

## 2021-04-29 PROCEDURE — 1100F PTFALLS ASSESS-DOCD GE2>/YR: CPT | Mod: CPTII,S$GLB,, | Performed by: OTOLARYNGOLOGY

## 2021-04-29 PROCEDURE — 1100F PR PT FALLS ASSESS DOC 2+ FALLS/FALL W/INJURY/YR: ICD-10-PCS | Mod: CPTII,S$GLB,, | Performed by: OTOLARYNGOLOGY

## 2021-04-29 PROCEDURE — 99999 PR PBB SHADOW E&M-EST. PATIENT-LVL III: CPT | Mod: PBBFAC,,, | Performed by: OTOLARYNGOLOGY

## 2021-04-29 PROCEDURE — 3288F FALL RISK ASSESSMENT DOCD: CPT | Mod: CPTII,S$GLB,, | Performed by: OTOLARYNGOLOGY

## 2021-04-29 PROCEDURE — 1125F PR PAIN SEVERITY QUANTIFIED, PAIN PRESENT: ICD-10-PCS | Mod: S$GLB,,, | Performed by: OTOLARYNGOLOGY

## 2021-04-29 PROCEDURE — 1159F PR MEDICATION LIST DOCUMENTED IN MEDICAL RECORD: ICD-10-PCS | Mod: S$GLB,,, | Performed by: OTOLARYNGOLOGY

## 2021-04-29 PROCEDURE — 99213 OFFICE O/P EST LOW 20 MIN: CPT | Mod: S$GLB,,, | Performed by: OTOLARYNGOLOGY

## 2021-04-29 PROCEDURE — 3008F PR BODY MASS INDEX (BMI) DOCUMENTED: ICD-10-PCS | Mod: CPTII,S$GLB,, | Performed by: OTOLARYNGOLOGY

## 2021-04-29 PROCEDURE — 3288F PR FALLS RISK ASSESSMENT DOCUMENTED: ICD-10-PCS | Mod: CPTII,S$GLB,, | Performed by: OTOLARYNGOLOGY

## 2021-04-29 PROCEDURE — 3008F BODY MASS INDEX DOCD: CPT | Mod: CPTII,S$GLB,, | Performed by: OTOLARYNGOLOGY

## 2021-04-29 PROCEDURE — 99999 PR PBB SHADOW E&M-EST. PATIENT-LVL III: ICD-10-PCS | Mod: PBBFAC,,, | Performed by: OTOLARYNGOLOGY

## 2021-05-03 ENCOUNTER — TELEPHONE (OUTPATIENT)
Dept: NEPHROLOGY | Facility: CLINIC | Age: 71
End: 2021-05-03

## 2021-05-03 ENCOUNTER — LAB VISIT (OUTPATIENT)
Dept: LAB | Facility: HOSPITAL | Age: 71
End: 2021-05-03
Payer: MEDICARE

## 2021-05-03 ENCOUNTER — OFFICE VISIT (OUTPATIENT)
Dept: NEPHROLOGY | Facility: CLINIC | Age: 71
End: 2021-05-03
Payer: MEDICARE

## 2021-05-03 VITALS
DIASTOLIC BLOOD PRESSURE: 78 MMHG | WEIGHT: 196.19 LBS | SYSTOLIC BLOOD PRESSURE: 130 MMHG | HEART RATE: 87 BPM | BODY MASS INDEX: 25.19 KG/M2 | OXYGEN SATURATION: 98 %

## 2021-05-03 DIAGNOSIS — N18.32 STAGE 3B CHRONIC KIDNEY DISEASE: Primary | ICD-10-CM

## 2021-05-03 DIAGNOSIS — I10 HYPERTENSION, UNSPECIFIED TYPE: ICD-10-CM

## 2021-05-03 DIAGNOSIS — K94.19 ALTERED BOWEL ELIMINATION DUE TO INTESTINAL OSTOMY: ICD-10-CM

## 2021-05-03 DIAGNOSIS — N18.30 STAGE 3 CHRONIC KIDNEY DISEASE, UNSPECIFIED WHETHER STAGE 3A OR 3B CKD: Primary | ICD-10-CM

## 2021-05-03 DIAGNOSIS — E87.5 HYPERKALEMIA: ICD-10-CM

## 2021-05-03 DIAGNOSIS — N18.32 STAGE 3B CHRONIC KIDNEY DISEASE: ICD-10-CM

## 2021-05-03 LAB
ALBUMIN SERPL BCP-MCNC: 3.3 G/DL (ref 3.5–5.2)
ANION GAP SERPL CALC-SCNC: 10 MMOL/L (ref 8–16)
BUN SERPL-MCNC: 34 MG/DL (ref 8–23)
CALCIUM SERPL-MCNC: 8.9 MG/DL (ref 8.7–10.5)
CHLORIDE SERPL-SCNC: 106 MMOL/L (ref 95–110)
CO2 SERPL-SCNC: 25 MMOL/L (ref 23–29)
CREAT SERPL-MCNC: 1.7 MG/DL (ref 0.5–1.4)
EST. GFR  (AFRICAN AMERICAN): 45.6 ML/MIN/1.73 M^2
EST. GFR  (NON AFRICAN AMERICAN): 39.4 ML/MIN/1.73 M^2
GLUCOSE SERPL-MCNC: 113 MG/DL (ref 70–110)
PHOSPHATE SERPL-MCNC: 3.7 MG/DL (ref 2.7–4.5)
POTASSIUM SERPL-SCNC: 4.2 MMOL/L (ref 3.5–5.1)
SODIUM SERPL-SCNC: 141 MMOL/L (ref 136–145)

## 2021-05-03 PROCEDURE — 36415 COLL VENOUS BLD VENIPUNCTURE: CPT | Performed by: NURSE PRACTITIONER

## 2021-05-03 PROCEDURE — 99499 RISK ADDL DX/OHS AUDIT: ICD-10-PCS | Mod: S$GLB,,, | Performed by: NURSE PRACTITIONER

## 2021-05-03 PROCEDURE — 99999 PR PBB SHADOW E&M-EST. PATIENT-LVL IV: CPT | Mod: PBBFAC,,, | Performed by: NURSE PRACTITIONER

## 2021-05-03 PROCEDURE — 99499 UNLISTED E&M SERVICE: CPT | Mod: S$GLB,,, | Performed by: NURSE PRACTITIONER

## 2021-05-03 PROCEDURE — 99999 PR PBB SHADOW E&M-EST. PATIENT-LVL IV: ICD-10-PCS | Mod: PBBFAC,,, | Performed by: NURSE PRACTITIONER

## 2021-05-03 PROCEDURE — 80069 RENAL FUNCTION PANEL: CPT | Performed by: NURSE PRACTITIONER

## 2021-05-18 ENCOUNTER — OFFICE VISIT (OUTPATIENT)
Dept: PRIMARY CARE CLINIC | Facility: CLINIC | Age: 71
End: 2021-05-18
Payer: MEDICARE

## 2021-05-18 VITALS
BODY MASS INDEX: 26.6 KG/M2 | WEIGHT: 207.25 LBS | RESPIRATION RATE: 16 BRPM | HEIGHT: 74 IN | HEART RATE: 76 BPM | OXYGEN SATURATION: 97 % | SYSTOLIC BLOOD PRESSURE: 136 MMHG | TEMPERATURE: 99 F | DIASTOLIC BLOOD PRESSURE: 84 MMHG

## 2021-05-18 DIAGNOSIS — R55 SYNCOPE, UNSPECIFIED SYNCOPE TYPE: ICD-10-CM

## 2021-05-18 DIAGNOSIS — R00.0 TACHYCARDIA: ICD-10-CM

## 2021-05-18 DIAGNOSIS — E78.5 HYPERLIPIDEMIA, UNSPECIFIED HYPERLIPIDEMIA TYPE: ICD-10-CM

## 2021-05-18 DIAGNOSIS — K52.9 CHRONIC DIARRHEA: ICD-10-CM

## 2021-05-18 DIAGNOSIS — R10.9 ABDOMINAL CRAMPING: ICD-10-CM

## 2021-05-18 DIAGNOSIS — G90.9 AUTONOMIC INSTABILITY: ICD-10-CM

## 2021-05-18 DIAGNOSIS — R97.20 ELEVATED PSA: ICD-10-CM

## 2021-05-18 DIAGNOSIS — E86.0 RECURRENT DEHYDRATION: ICD-10-CM

## 2021-05-18 DIAGNOSIS — Z13.6 ENCOUNTER FOR LIPID SCREENING FOR CARDIOVASCULAR DISEASE: ICD-10-CM

## 2021-05-18 DIAGNOSIS — Z13.220 ENCOUNTER FOR LIPID SCREENING FOR CARDIOVASCULAR DISEASE: ICD-10-CM

## 2021-05-18 DIAGNOSIS — Z12.5 SCREENING FOR PROSTATE CANCER: ICD-10-CM

## 2021-05-18 DIAGNOSIS — N18.2 CHRONIC RENAL IMPAIRMENT, STAGE 2 (MILD): Primary | ICD-10-CM

## 2021-05-18 DIAGNOSIS — E34.0 CARCINOID SYNDROME: ICD-10-CM

## 2021-05-18 DIAGNOSIS — R06.09 DOE (DYSPNEA ON EXERTION): ICD-10-CM

## 2021-05-18 PROCEDURE — 1159F PR MEDICATION LIST DOCUMENTED IN MEDICAL RECORD: ICD-10-PCS | Mod: S$GLB,,, | Performed by: INTERNAL MEDICINE

## 2021-05-18 PROCEDURE — 99999 PR PBB SHADOW E&M-EST. PATIENT-LVL V: CPT | Mod: PBBFAC,,, | Performed by: INTERNAL MEDICINE

## 2021-05-18 PROCEDURE — 99214 PR OFFICE/OUTPT VISIT, EST, LEVL IV, 30-39 MIN: ICD-10-PCS | Mod: S$GLB,,, | Performed by: INTERNAL MEDICINE

## 2021-05-18 PROCEDURE — 1159F MED LIST DOCD IN RCRD: CPT | Mod: S$GLB,,, | Performed by: INTERNAL MEDICINE

## 2021-05-18 PROCEDURE — 1101F PR PT FALLS ASSESS DOC 0-1 FALLS W/OUT INJ PAST YR: ICD-10-PCS | Mod: CPTII,S$GLB,, | Performed by: INTERNAL MEDICINE

## 2021-05-18 PROCEDURE — 3008F BODY MASS INDEX DOCD: CPT | Mod: CPTII,S$GLB,, | Performed by: INTERNAL MEDICINE

## 2021-05-18 PROCEDURE — 1126F PR PAIN SEVERITY QUANTIFIED, NO PAIN PRESENT: ICD-10-PCS | Mod: S$GLB,,, | Performed by: INTERNAL MEDICINE

## 2021-05-18 PROCEDURE — 1101F PT FALLS ASSESS-DOCD LE1/YR: CPT | Mod: CPTII,S$GLB,, | Performed by: INTERNAL MEDICINE

## 2021-05-18 PROCEDURE — 3008F PR BODY MASS INDEX (BMI) DOCUMENTED: ICD-10-PCS | Mod: CPTII,S$GLB,, | Performed by: INTERNAL MEDICINE

## 2021-05-18 PROCEDURE — 1126F AMNT PAIN NOTED NONE PRSNT: CPT | Mod: S$GLB,,, | Performed by: INTERNAL MEDICINE

## 2021-05-18 PROCEDURE — 3288F FALL RISK ASSESSMENT DOCD: CPT | Mod: CPTII,S$GLB,, | Performed by: INTERNAL MEDICINE

## 2021-05-18 PROCEDURE — 99999 PR PBB SHADOW E&M-EST. PATIENT-LVL V: ICD-10-PCS | Mod: PBBFAC,,, | Performed by: INTERNAL MEDICINE

## 2021-05-18 PROCEDURE — 3288F PR FALLS RISK ASSESSMENT DOCUMENTED: ICD-10-PCS | Mod: CPTII,S$GLB,, | Performed by: INTERNAL MEDICINE

## 2021-05-18 PROCEDURE — 99214 OFFICE O/P EST MOD 30 MIN: CPT | Mod: S$GLB,,, | Performed by: INTERNAL MEDICINE

## 2021-05-18 RX ORDER — PRAVASTATIN SODIUM 40 MG/1
40 TABLET ORAL DAILY
Qty: 90 TABLET | Refills: 3 | Status: SHIPPED | OUTPATIENT
Start: 2021-05-18 | End: 2022-09-27 | Stop reason: SDUPTHER

## 2021-05-18 RX ORDER — DIPHENOXYLATE HYDROCHLORIDE AND ATROPINE SULFATE 2.5; .025 MG/1; MG/1
1 TABLET ORAL DAILY
Qty: 90 TABLET | Refills: 3 | Status: SHIPPED | OUTPATIENT
Start: 2021-05-18 | End: 2021-08-16

## 2021-05-18 RX ORDER — DICYCLOMINE HYDROCHLORIDE 20 MG/1
20 TABLET ORAL EVERY 6 HOURS PRN
Qty: 60 TABLET | Refills: 2 | Status: SHIPPED | OUTPATIENT
Start: 2021-05-18 | End: 2021-06-17

## 2021-05-27 ENCOUNTER — DOCUMENTATION ONLY (OUTPATIENT)
Dept: NEPHROLOGY | Facility: CLINIC | Age: 71
End: 2021-05-27

## 2021-05-28 ENCOUNTER — TELEPHONE (OUTPATIENT)
Dept: PRIMARY CARE CLINIC | Facility: CLINIC | Age: 71
End: 2021-05-28

## 2021-06-02 ENCOUNTER — OFFICE VISIT (OUTPATIENT)
Dept: PRIMARY CARE CLINIC | Facility: CLINIC | Age: 71
End: 2021-06-02
Payer: MEDICARE

## 2021-06-02 VITALS
HEIGHT: 74 IN | TEMPERATURE: 98 F | DIASTOLIC BLOOD PRESSURE: 78 MMHG | SYSTOLIC BLOOD PRESSURE: 126 MMHG | RESPIRATION RATE: 16 BRPM | HEART RATE: 88 BPM | WEIGHT: 201 LBS | BODY MASS INDEX: 25.8 KG/M2 | OXYGEN SATURATION: 96 %

## 2021-06-02 DIAGNOSIS — Z93.2 HIGH OUTPUT ILEOSTOMY: ICD-10-CM

## 2021-06-02 DIAGNOSIS — I10 ESSENTIAL HYPERTENSION: ICD-10-CM

## 2021-06-02 DIAGNOSIS — R19.8 HIGH OUTPUT ILEOSTOMY: ICD-10-CM

## 2021-06-02 DIAGNOSIS — R79.89 PRERENAL AZOTEMIA: ICD-10-CM

## 2021-06-02 DIAGNOSIS — E86.0 DEHYDRATION: Primary | ICD-10-CM

## 2021-06-02 PROCEDURE — 99999 PR PBB SHADOW E&M-EST. PATIENT-LVL V: CPT | Mod: PBBFAC,,, | Performed by: INTERNAL MEDICINE

## 2021-06-02 PROCEDURE — 1159F MED LIST DOCD IN RCRD: CPT | Mod: S$GLB,,, | Performed by: INTERNAL MEDICINE

## 2021-06-02 PROCEDURE — 99214 PR OFFICE/OUTPT VISIT, EST, LEVL IV, 30-39 MIN: ICD-10-PCS | Mod: S$GLB,,, | Performed by: INTERNAL MEDICINE

## 2021-06-02 PROCEDURE — 99214 OFFICE O/P EST MOD 30 MIN: CPT | Mod: S$GLB,,, | Performed by: INTERNAL MEDICINE

## 2021-06-02 PROCEDURE — 99999 PR PBB SHADOW E&M-EST. PATIENT-LVL V: ICD-10-PCS | Mod: PBBFAC,,, | Performed by: INTERNAL MEDICINE

## 2021-06-02 PROCEDURE — 3008F BODY MASS INDEX DOCD: CPT | Mod: CPTII,S$GLB,, | Performed by: INTERNAL MEDICINE

## 2021-06-02 PROCEDURE — 1126F PR PAIN SEVERITY QUANTIFIED, NO PAIN PRESENT: ICD-10-PCS | Mod: S$GLB,,, | Performed by: INTERNAL MEDICINE

## 2021-06-02 PROCEDURE — 1126F AMNT PAIN NOTED NONE PRSNT: CPT | Mod: S$GLB,,, | Performed by: INTERNAL MEDICINE

## 2021-06-02 PROCEDURE — 3008F PR BODY MASS INDEX (BMI) DOCUMENTED: ICD-10-PCS | Mod: CPTII,S$GLB,, | Performed by: INTERNAL MEDICINE

## 2021-06-02 PROCEDURE — 1159F PR MEDICATION LIST DOCUMENTED IN MEDICAL RECORD: ICD-10-PCS | Mod: S$GLB,,, | Performed by: INTERNAL MEDICINE

## 2021-06-02 RX ORDER — FLUDROCORTISONE ACETATE 0.1 MG/1
100 TABLET ORAL DAILY
Qty: 30 TABLET | Refills: 3 | Status: SHIPPED | OUTPATIENT
Start: 2021-06-02 | End: 2021-06-14 | Stop reason: SDUPTHER

## 2021-06-02 RX ORDER — FLUDROCORTISONE ACETATE 0.1 MG/1
100 TABLET ORAL DAILY
Qty: 30 TABLET | Refills: 3 | Status: SHIPPED | OUTPATIENT
Start: 2021-06-02 | End: 2021-06-02 | Stop reason: SDUPTHER

## 2021-06-11 ENCOUNTER — OFFICE VISIT (OUTPATIENT)
Dept: UROLOGY | Facility: CLINIC | Age: 71
End: 2021-06-11
Payer: MEDICARE

## 2021-06-11 VITALS
HEIGHT: 74 IN | SYSTOLIC BLOOD PRESSURE: 160 MMHG | WEIGHT: 206.81 LBS | DIASTOLIC BLOOD PRESSURE: 88 MMHG | BODY MASS INDEX: 26.54 KG/M2 | HEART RATE: 62 BPM

## 2021-06-11 DIAGNOSIS — C61 PROSTATE CANCER: Primary | ICD-10-CM

## 2021-06-11 PROCEDURE — 99214 OFFICE O/P EST MOD 30 MIN: CPT | Mod: S$GLB,,, | Performed by: UROLOGY

## 2021-06-11 PROCEDURE — 99499 RISK ADDL DX/OHS AUDIT: ICD-10-PCS | Mod: S$GLB,,, | Performed by: UROLOGY

## 2021-06-11 PROCEDURE — 3008F BODY MASS INDEX DOCD: CPT | Mod: CPTII,S$GLB,, | Performed by: UROLOGY

## 2021-06-11 PROCEDURE — 1126F AMNT PAIN NOTED NONE PRSNT: CPT | Mod: S$GLB,,, | Performed by: UROLOGY

## 2021-06-11 PROCEDURE — 1159F PR MEDICATION LIST DOCUMENTED IN MEDICAL RECORD: ICD-10-PCS | Mod: S$GLB,,, | Performed by: UROLOGY

## 2021-06-11 PROCEDURE — 1101F PT FALLS ASSESS-DOCD LE1/YR: CPT | Mod: CPTII,S$GLB,, | Performed by: UROLOGY

## 2021-06-11 PROCEDURE — 99499 UNLISTED E&M SERVICE: CPT | Mod: S$GLB,,, | Performed by: UROLOGY

## 2021-06-11 PROCEDURE — 99214 PR OFFICE/OUTPT VISIT, EST, LEVL IV, 30-39 MIN: ICD-10-PCS | Mod: S$GLB,,, | Performed by: UROLOGY

## 2021-06-11 PROCEDURE — 1101F PR PT FALLS ASSESS DOC 0-1 FALLS W/OUT INJ PAST YR: ICD-10-PCS | Mod: CPTII,S$GLB,, | Performed by: UROLOGY

## 2021-06-11 PROCEDURE — 3008F PR BODY MASS INDEX (BMI) DOCUMENTED: ICD-10-PCS | Mod: CPTII,S$GLB,, | Performed by: UROLOGY

## 2021-06-11 PROCEDURE — 99999 PR PBB SHADOW E&M-EST. PATIENT-LVL IV: ICD-10-PCS | Mod: PBBFAC,,, | Performed by: UROLOGY

## 2021-06-11 PROCEDURE — 3288F PR FALLS RISK ASSESSMENT DOCUMENTED: ICD-10-PCS | Mod: CPTII,S$GLB,, | Performed by: UROLOGY

## 2021-06-11 PROCEDURE — 3288F FALL RISK ASSESSMENT DOCD: CPT | Mod: CPTII,S$GLB,, | Performed by: UROLOGY

## 2021-06-11 PROCEDURE — 99999 PR PBB SHADOW E&M-EST. PATIENT-LVL IV: CPT | Mod: PBBFAC,,, | Performed by: UROLOGY

## 2021-06-11 PROCEDURE — 1126F PR PAIN SEVERITY QUANTIFIED, NO PAIN PRESENT: ICD-10-PCS | Mod: S$GLB,,, | Performed by: UROLOGY

## 2021-06-11 PROCEDURE — 1159F MED LIST DOCD IN RCRD: CPT | Mod: S$GLB,,, | Performed by: UROLOGY

## 2021-06-14 DIAGNOSIS — E86.0 DEHYDRATION: ICD-10-CM

## 2021-06-14 DIAGNOSIS — R79.89 PRERENAL AZOTEMIA: ICD-10-CM

## 2021-06-15 RX ORDER — FLUDROCORTISONE ACETATE 0.1 MG/1
100 TABLET ORAL DAILY
Qty: 90 TABLET | Refills: 3 | Status: SHIPPED | OUTPATIENT
Start: 2021-06-15 | End: 2021-06-22 | Stop reason: SDUPTHER

## 2021-06-22 ENCOUNTER — OFFICE VISIT (OUTPATIENT)
Dept: NEPHROLOGY | Facility: CLINIC | Age: 71
End: 2021-06-22
Payer: MEDICARE

## 2021-06-22 VITALS
OXYGEN SATURATION: 98 % | WEIGHT: 207 LBS | BODY MASS INDEX: 26.58 KG/M2 | DIASTOLIC BLOOD PRESSURE: 86 MMHG | SYSTOLIC BLOOD PRESSURE: 120 MMHG | HEART RATE: 73 BPM

## 2021-06-22 DIAGNOSIS — N18.32 STAGE 3B CHRONIC KIDNEY DISEASE: Primary | ICD-10-CM

## 2021-06-22 DIAGNOSIS — K94.19 ALTERED BOWEL ELIMINATION DUE TO INTESTINAL OSTOMY: ICD-10-CM

## 2021-06-22 DIAGNOSIS — E86.0 DEHYDRATION: ICD-10-CM

## 2021-06-22 DIAGNOSIS — R79.89 PRERENAL AZOTEMIA: ICD-10-CM

## 2021-06-22 DIAGNOSIS — I10 ESSENTIAL HYPERTENSION: ICD-10-CM

## 2021-06-22 DIAGNOSIS — I10 HYPERTENSION, UNSPECIFIED TYPE: ICD-10-CM

## 2021-06-22 DIAGNOSIS — E87.5 HYPERKALEMIA: ICD-10-CM

## 2021-06-22 PROCEDURE — 99999 PR PBB SHADOW E&M-EST. PATIENT-LVL IV: ICD-10-PCS | Mod: PBBFAC,,, | Performed by: NURSE PRACTITIONER

## 2021-06-22 PROCEDURE — 1126F AMNT PAIN NOTED NONE PRSNT: CPT | Mod: S$GLB,,, | Performed by: NURSE PRACTITIONER

## 2021-06-22 PROCEDURE — 99214 PR OFFICE/OUTPT VISIT, EST, LEVL IV, 30-39 MIN: ICD-10-PCS | Mod: S$GLB,,, | Performed by: NURSE PRACTITIONER

## 2021-06-22 PROCEDURE — 1126F PR PAIN SEVERITY QUANTIFIED, NO PAIN PRESENT: ICD-10-PCS | Mod: S$GLB,,, | Performed by: NURSE PRACTITIONER

## 2021-06-22 PROCEDURE — 1159F MED LIST DOCD IN RCRD: CPT | Mod: S$GLB,,, | Performed by: NURSE PRACTITIONER

## 2021-06-22 PROCEDURE — 1101F PT FALLS ASSESS-DOCD LE1/YR: CPT | Mod: CPTII,S$GLB,, | Performed by: NURSE PRACTITIONER

## 2021-06-22 PROCEDURE — 1101F PR PT FALLS ASSESS DOC 0-1 FALLS W/OUT INJ PAST YR: ICD-10-PCS | Mod: CPTII,S$GLB,, | Performed by: NURSE PRACTITIONER

## 2021-06-22 PROCEDURE — 3008F PR BODY MASS INDEX (BMI) DOCUMENTED: ICD-10-PCS | Mod: CPTII,S$GLB,, | Performed by: NURSE PRACTITIONER

## 2021-06-22 PROCEDURE — 99214 OFFICE O/P EST MOD 30 MIN: CPT | Mod: S$GLB,,, | Performed by: NURSE PRACTITIONER

## 2021-06-22 PROCEDURE — 3008F BODY MASS INDEX DOCD: CPT | Mod: CPTII,S$GLB,, | Performed by: NURSE PRACTITIONER

## 2021-06-22 PROCEDURE — 3288F PR FALLS RISK ASSESSMENT DOCUMENTED: ICD-10-PCS | Mod: CPTII,S$GLB,, | Performed by: NURSE PRACTITIONER

## 2021-06-22 PROCEDURE — 3288F FALL RISK ASSESSMENT DOCD: CPT | Mod: CPTII,S$GLB,, | Performed by: NURSE PRACTITIONER

## 2021-06-22 PROCEDURE — 99999 PR PBB SHADOW E&M-EST. PATIENT-LVL IV: CPT | Mod: PBBFAC,,, | Performed by: NURSE PRACTITIONER

## 2021-06-22 PROCEDURE — 1159F PR MEDICATION LIST DOCUMENTED IN MEDICAL RECORD: ICD-10-PCS | Mod: S$GLB,,, | Performed by: NURSE PRACTITIONER

## 2021-06-22 RX ORDER — FLUDROCORTISONE ACETATE 0.1 MG/1
100 TABLET ORAL DAILY
Qty: 90 TABLET | Refills: 3 | Status: SHIPPED | OUTPATIENT
Start: 2021-06-22 | End: 2021-07-22

## 2021-07-08 ENCOUNTER — OFFICE VISIT (OUTPATIENT)
Dept: PRIMARY CARE CLINIC | Facility: CLINIC | Age: 71
End: 2021-07-08
Payer: MEDICARE

## 2021-07-08 VITALS
HEART RATE: 78 BPM | WEIGHT: 202 LBS | OXYGEN SATURATION: 95 % | TEMPERATURE: 98 F | SYSTOLIC BLOOD PRESSURE: 128 MMHG | DIASTOLIC BLOOD PRESSURE: 74 MMHG | HEIGHT: 74 IN | RESPIRATION RATE: 16 BRPM | BODY MASS INDEX: 25.93 KG/M2

## 2021-07-08 DIAGNOSIS — I10 ESSENTIAL HYPERTENSION: ICD-10-CM

## 2021-07-08 DIAGNOSIS — Z93.2 HIGH OUTPUT ILEOSTOMY: ICD-10-CM

## 2021-07-08 DIAGNOSIS — I95.1 ORTHOSTATIC HYPOTENSION: ICD-10-CM

## 2021-07-08 DIAGNOSIS — R19.8 HIGH OUTPUT ILEOSTOMY: ICD-10-CM

## 2021-07-08 DIAGNOSIS — N18.2 CHRONIC RENAL IMPAIRMENT, STAGE 2 (MILD): Primary | ICD-10-CM

## 2021-07-08 PROCEDURE — 1159F MED LIST DOCD IN RCRD: CPT | Mod: S$GLB,,, | Performed by: INTERNAL MEDICINE

## 2021-07-08 PROCEDURE — 3008F PR BODY MASS INDEX (BMI) DOCUMENTED: ICD-10-PCS | Mod: CPTII,S$GLB,, | Performed by: INTERNAL MEDICINE

## 2021-07-08 PROCEDURE — 99213 PR OFFICE/OUTPT VISIT, EST, LEVL III, 20-29 MIN: ICD-10-PCS | Mod: S$GLB,,, | Performed by: INTERNAL MEDICINE

## 2021-07-08 PROCEDURE — 1159F PR MEDICATION LIST DOCUMENTED IN MEDICAL RECORD: ICD-10-PCS | Mod: S$GLB,,, | Performed by: INTERNAL MEDICINE

## 2021-07-08 PROCEDURE — 1126F AMNT PAIN NOTED NONE PRSNT: CPT | Mod: S$GLB,,, | Performed by: INTERNAL MEDICINE

## 2021-07-08 PROCEDURE — 3288F FALL RISK ASSESSMENT DOCD: CPT | Mod: CPTII,S$GLB,, | Performed by: INTERNAL MEDICINE

## 2021-07-08 PROCEDURE — 99999 PR PBB SHADOW E&M-EST. PATIENT-LVL III: ICD-10-PCS | Mod: PBBFAC,,, | Performed by: INTERNAL MEDICINE

## 2021-07-08 PROCEDURE — 99499 RISK ADDL DX/OHS AUDIT: ICD-10-PCS | Mod: S$GLB,,, | Performed by: INTERNAL MEDICINE

## 2021-07-08 PROCEDURE — 99213 OFFICE O/P EST LOW 20 MIN: CPT | Mod: S$GLB,,, | Performed by: INTERNAL MEDICINE

## 2021-07-08 PROCEDURE — 99999 PR PBB SHADOW E&M-EST. PATIENT-LVL III: CPT | Mod: PBBFAC,,, | Performed by: INTERNAL MEDICINE

## 2021-07-08 PROCEDURE — 1101F PT FALLS ASSESS-DOCD LE1/YR: CPT | Mod: CPTII,S$GLB,, | Performed by: INTERNAL MEDICINE

## 2021-07-08 PROCEDURE — 1101F PR PT FALLS ASSESS DOC 0-1 FALLS W/OUT INJ PAST YR: ICD-10-PCS | Mod: CPTII,S$GLB,, | Performed by: INTERNAL MEDICINE

## 2021-07-08 PROCEDURE — 1126F PR PAIN SEVERITY QUANTIFIED, NO PAIN PRESENT: ICD-10-PCS | Mod: S$GLB,,, | Performed by: INTERNAL MEDICINE

## 2021-07-08 PROCEDURE — 99499 UNLISTED E&M SERVICE: CPT | Mod: S$GLB,,, | Performed by: INTERNAL MEDICINE

## 2021-07-08 PROCEDURE — 3288F PR FALLS RISK ASSESSMENT DOCUMENTED: ICD-10-PCS | Mod: CPTII,S$GLB,, | Performed by: INTERNAL MEDICINE

## 2021-07-08 PROCEDURE — 3008F BODY MASS INDEX DOCD: CPT | Mod: CPTII,S$GLB,, | Performed by: INTERNAL MEDICINE

## 2021-07-08 RX ORDER — DOXYCYCLINE 100 MG/1
100 CAPSULE ORAL 2 TIMES DAILY
COMMUNITY
Start: 2021-06-15 | End: 2021-10-06

## 2021-07-08 RX ORDER — DROXIDOPA 100 MG/1
1 CAPSULE ORAL 3 TIMES DAILY
COMMUNITY
Start: 2021-06-23 | End: 2022-02-16 | Stop reason: DRUGHIGH

## 2021-07-08 RX ORDER — BRIMONIDINE TARTRATE 2 MG/ML
SOLUTION/ DROPS OPHTHALMIC
Qty: 10 ML | Refills: 4 | Status: SHIPPED | OUTPATIENT
Start: 2021-07-08 | End: 2022-01-11

## 2021-07-31 ENCOUNTER — PATIENT OUTREACH (OUTPATIENT)
Dept: ADMINISTRATIVE | Facility: OTHER | Age: 71
End: 2021-07-31

## 2021-08-04 ENCOUNTER — RESEARCH ENCOUNTER (OUTPATIENT)
Dept: NEPHROLOGY | Facility: CLINIC | Age: 71
End: 2021-08-04

## 2021-08-04 ENCOUNTER — OFFICE VISIT (OUTPATIENT)
Dept: NEPHROLOGY | Facility: CLINIC | Age: 71
End: 2021-08-04
Payer: MEDICARE

## 2021-08-04 VITALS
HEIGHT: 74 IN | OXYGEN SATURATION: 96 % | BODY MASS INDEX: 27.05 KG/M2 | HEART RATE: 92 BPM | WEIGHT: 210.75 LBS | SYSTOLIC BLOOD PRESSURE: 140 MMHG | DIASTOLIC BLOOD PRESSURE: 90 MMHG

## 2021-08-04 DIAGNOSIS — K94.19 ALTERED BOWEL ELIMINATION DUE TO INTESTINAL OSTOMY: ICD-10-CM

## 2021-08-04 DIAGNOSIS — I10 ESSENTIAL HYPERTENSION: ICD-10-CM

## 2021-08-04 DIAGNOSIS — N18.32 STAGE 3B CHRONIC KIDNEY DISEASE: Primary | ICD-10-CM

## 2021-08-04 DIAGNOSIS — I10 HYPERTENSION, UNSPECIFIED TYPE: ICD-10-CM

## 2021-08-04 PROCEDURE — 99214 PR OFFICE/OUTPT VISIT, EST, LEVL IV, 30-39 MIN: ICD-10-PCS | Mod: S$GLB,,, | Performed by: NURSE PRACTITIONER

## 2021-08-04 PROCEDURE — 99499 UNLISTED E&M SERVICE: CPT | Mod: S$GLB,,, | Performed by: NURSE PRACTITIONER

## 2021-08-04 PROCEDURE — 3080F PR MOST RECENT DIASTOLIC BLOOD PRESSURE >= 90 MM HG: ICD-10-PCS | Mod: CPTII,S$GLB,, | Performed by: NURSE PRACTITIONER

## 2021-08-04 PROCEDURE — 1101F PT FALLS ASSESS-DOCD LE1/YR: CPT | Mod: CPTII,S$GLB,, | Performed by: NURSE PRACTITIONER

## 2021-08-04 PROCEDURE — 1126F AMNT PAIN NOTED NONE PRSNT: CPT | Mod: CPTII,S$GLB,, | Performed by: NURSE PRACTITIONER

## 2021-08-04 PROCEDURE — 3044F HG A1C LEVEL LT 7.0%: CPT | Mod: CPTII,S$GLB,, | Performed by: NURSE PRACTITIONER

## 2021-08-04 PROCEDURE — 3080F DIAST BP >= 90 MM HG: CPT | Mod: CPTII,S$GLB,, | Performed by: NURSE PRACTITIONER

## 2021-08-04 PROCEDURE — 1159F MED LIST DOCD IN RCRD: CPT | Mod: CPTII,S$GLB,, | Performed by: NURSE PRACTITIONER

## 2021-08-04 PROCEDURE — 1101F PR PT FALLS ASSESS DOC 0-1 FALLS W/OUT INJ PAST YR: ICD-10-PCS | Mod: CPTII,S$GLB,, | Performed by: NURSE PRACTITIONER

## 2021-08-04 PROCEDURE — 1160F RVW MEDS BY RX/DR IN RCRD: CPT | Mod: CPTII,S$GLB,, | Performed by: NURSE PRACTITIONER

## 2021-08-04 PROCEDURE — 99999 PR PBB SHADOW E&M-EST. PATIENT-LVL V: ICD-10-PCS | Mod: PBBFAC,,, | Performed by: NURSE PRACTITIONER

## 2021-08-04 PROCEDURE — 3008F BODY MASS INDEX DOCD: CPT | Mod: CPTII,S$GLB,, | Performed by: NURSE PRACTITIONER

## 2021-08-04 PROCEDURE — 3288F FALL RISK ASSESSMENT DOCD: CPT | Mod: CPTII,S$GLB,, | Performed by: NURSE PRACTITIONER

## 2021-08-04 PROCEDURE — 3044F PR MOST RECENT HEMOGLOBIN A1C LEVEL <7.0%: ICD-10-PCS | Mod: CPTII,S$GLB,, | Performed by: NURSE PRACTITIONER

## 2021-08-04 PROCEDURE — 1126F PR PAIN SEVERITY QUANTIFIED, NO PAIN PRESENT: ICD-10-PCS | Mod: CPTII,S$GLB,, | Performed by: NURSE PRACTITIONER

## 2021-08-04 PROCEDURE — 99214 OFFICE O/P EST MOD 30 MIN: CPT | Mod: S$GLB,,, | Performed by: NURSE PRACTITIONER

## 2021-08-04 PROCEDURE — 99999 PR PBB SHADOW E&M-EST. PATIENT-LVL V: CPT | Mod: PBBFAC,,, | Performed by: NURSE PRACTITIONER

## 2021-08-04 PROCEDURE — 99499 RISK ADDL DX/OHS AUDIT: ICD-10-PCS | Mod: S$GLB,,, | Performed by: NURSE PRACTITIONER

## 2021-08-04 PROCEDURE — 3008F PR BODY MASS INDEX (BMI) DOCUMENTED: ICD-10-PCS | Mod: CPTII,S$GLB,, | Performed by: NURSE PRACTITIONER

## 2021-08-04 PROCEDURE — 3288F PR FALLS RISK ASSESSMENT DOCUMENTED: ICD-10-PCS | Mod: CPTII,S$GLB,, | Performed by: NURSE PRACTITIONER

## 2021-08-04 PROCEDURE — 3077F PR MOST RECENT SYSTOLIC BLOOD PRESSURE >= 140 MM HG: ICD-10-PCS | Mod: CPTII,S$GLB,, | Performed by: NURSE PRACTITIONER

## 2021-08-04 PROCEDURE — 1160F PR REVIEW ALL MEDS BY PRESCRIBER/CLIN PHARMACIST DOCUMENTED: ICD-10-PCS | Mod: CPTII,S$GLB,, | Performed by: NURSE PRACTITIONER

## 2021-08-04 PROCEDURE — 1159F PR MEDICATION LIST DOCUMENTED IN MEDICAL RECORD: ICD-10-PCS | Mod: CPTII,S$GLB,, | Performed by: NURSE PRACTITIONER

## 2021-08-04 PROCEDURE — 3077F SYST BP >= 140 MM HG: CPT | Mod: CPTII,S$GLB,, | Performed by: NURSE PRACTITIONER

## 2021-08-11 NOTE — PATIENT INSTRUCTIONS
You have dehydration from intermittent high-output ileostomy.    Please purchase Imodium liquid and G2 Gatorade at the store.    Please start taking Imodium liquid.  You can take 2 mg in the morning and at night.      If you continue to have high output (over 1000ml per day), you can increase this up to 4 mg 4 times a day (30 min before meals and 30 min prior to bedtime).    If you notice that your urine output drops off or becomes dark, please drink G2 Gatorade.    I will see back in 4 weeks.    CED Haley MD  Staff Surgeon  Colon & Rectal Surgery    
<-- Click to add NO significant Past Surgical History

## 2021-09-12 ENCOUNTER — TELEPHONE (OUTPATIENT)
Dept: PRIMARY CARE CLINIC | Facility: CLINIC | Age: 71
End: 2021-09-12

## 2021-09-14 ENCOUNTER — OFFICE VISIT (OUTPATIENT)
Dept: DERMATOLOGY | Facility: CLINIC | Age: 71
End: 2021-09-14
Payer: MEDICARE

## 2021-09-14 ENCOUNTER — TELEPHONE (OUTPATIENT)
Dept: NEPHROLOGY | Facility: CLINIC | Age: 71
End: 2021-09-14

## 2021-09-14 DIAGNOSIS — N17.9 AKI (ACUTE KIDNEY INJURY): Primary | ICD-10-CM

## 2021-09-14 DIAGNOSIS — Z85.828 HISTORY OF NONMELANOMA SKIN CANCER: ICD-10-CM

## 2021-09-14 DIAGNOSIS — L57.0 MULTIPLE ACTINIC KERATOSES: ICD-10-CM

## 2021-09-14 DIAGNOSIS — D48.5 NEOPLASM OF UNCERTAIN BEHAVIOR OF SKIN: Primary | ICD-10-CM

## 2021-09-14 PROCEDURE — 88342 IMHCHEM/IMCYTCHM 1ST ANTB: CPT | Mod: 26,HCNC,, | Performed by: DERMATOLOGY

## 2021-09-14 PROCEDURE — 88305 TISSUE EXAM BY PATHOLOGIST: CPT | Mod: 26,HCNC,, | Performed by: DERMATOLOGY

## 2021-09-14 PROCEDURE — 3288F FALL RISK ASSESSMENT DOCD: CPT | Mod: HCNC,CPTII,S$GLB, | Performed by: PATHOLOGY

## 2021-09-14 PROCEDURE — 4010F ACE/ARB THERAPY RXD/TAKEN: CPT | Mod: HCNC,CPTII,S$GLB, | Performed by: PATHOLOGY

## 2021-09-14 PROCEDURE — 1126F AMNT PAIN NOTED NONE PRSNT: CPT | Mod: HCNC,CPTII,S$GLB, | Performed by: PATHOLOGY

## 2021-09-14 PROCEDURE — 1101F PT FALLS ASSESS-DOCD LE1/YR: CPT | Mod: HCNC,CPTII,S$GLB, | Performed by: PATHOLOGY

## 2021-09-14 PROCEDURE — 1159F PR MEDICATION LIST DOCUMENTED IN MEDICAL RECORD: ICD-10-PCS | Mod: HCNC,CPTII,S$GLB, | Performed by: PATHOLOGY

## 2021-09-14 PROCEDURE — 99999 PR PBB SHADOW E&M-EST. PATIENT-LVL IV: CPT | Mod: PBBFAC,HCNC,, | Performed by: PATHOLOGY

## 2021-09-14 PROCEDURE — 3044F PR MOST RECENT HEMOGLOBIN A1C LEVEL <7.0%: ICD-10-PCS | Mod: HCNC,CPTII,S$GLB, | Performed by: PATHOLOGY

## 2021-09-14 PROCEDURE — 3044F HG A1C LEVEL LT 7.0%: CPT | Mod: HCNC,CPTII,S$GLB, | Performed by: PATHOLOGY

## 2021-09-14 PROCEDURE — 11103 PR TANGENTIAL BIOPSY, SKIN, EA ADDTL LESION: ICD-10-PCS | Mod: HCNC,S$GLB,, | Performed by: PATHOLOGY

## 2021-09-14 PROCEDURE — 3288F PR FALLS RISK ASSESSMENT DOCUMENTED: ICD-10-PCS | Mod: HCNC,CPTII,S$GLB, | Performed by: PATHOLOGY

## 2021-09-14 PROCEDURE — 3066F PR DOCUMENTATION OF TREATMENT FOR NEPHROPATHY: ICD-10-PCS | Mod: HCNC,CPTII,S$GLB, | Performed by: PATHOLOGY

## 2021-09-14 PROCEDURE — 99213 OFFICE O/P EST LOW 20 MIN: CPT | Mod: 25,HCNC,S$GLB, | Performed by: PATHOLOGY

## 2021-09-14 PROCEDURE — 1159F MED LIST DOCD IN RCRD: CPT | Mod: HCNC,CPTII,S$GLB, | Performed by: PATHOLOGY

## 2021-09-14 PROCEDURE — 88342 CHG IMMUNOCYTOCHEMISTRY: ICD-10-PCS | Mod: 26,HCNC,, | Performed by: DERMATOLOGY

## 2021-09-14 PROCEDURE — 17000 PR DESTRUCTION(LASER SURGERY,CRYOSURGERY,CHEMOSURGERY),PREMALIGNANT LESIONS,FIRST LESION: ICD-10-PCS | Mod: 59,HCNC,S$GLB, | Performed by: PATHOLOGY

## 2021-09-14 PROCEDURE — 11102 TANGNTL BX SKIN SINGLE LES: CPT | Mod: HCNC,S$GLB,, | Performed by: PATHOLOGY

## 2021-09-14 PROCEDURE — 1126F PR PAIN SEVERITY QUANTIFIED, NO PAIN PRESENT: ICD-10-PCS | Mod: HCNC,CPTII,S$GLB, | Performed by: PATHOLOGY

## 2021-09-14 PROCEDURE — 3066F NEPHROPATHY DOC TX: CPT | Mod: HCNC,CPTII,S$GLB, | Performed by: PATHOLOGY

## 2021-09-14 PROCEDURE — 99999 PR PBB SHADOW E&M-EST. PATIENT-LVL IV: ICD-10-PCS | Mod: PBBFAC,HCNC,, | Performed by: PATHOLOGY

## 2021-09-14 PROCEDURE — 1160F RVW MEDS BY RX/DR IN RCRD: CPT | Mod: HCNC,CPTII,S$GLB, | Performed by: PATHOLOGY

## 2021-09-14 PROCEDURE — 1160F PR REVIEW ALL MEDS BY PRESCRIBER/CLIN PHARMACIST DOCUMENTED: ICD-10-PCS | Mod: HCNC,CPTII,S$GLB, | Performed by: PATHOLOGY

## 2021-09-14 PROCEDURE — 88342 IMHCHEM/IMCYTCHM 1ST ANTB: CPT | Mod: HCNC | Performed by: DERMATOLOGY

## 2021-09-14 PROCEDURE — 88305 TISSUE EXAM BY PATHOLOGIST: ICD-10-PCS | Mod: 26,HCNC,, | Performed by: DERMATOLOGY

## 2021-09-14 PROCEDURE — 11102 PR TANGENTIAL BIOPSY, SKIN, SINGLE LESION: ICD-10-PCS | Mod: HCNC,S$GLB,, | Performed by: PATHOLOGY

## 2021-09-14 PROCEDURE — 11103 TANGNTL BX SKIN EA SEP/ADDL: CPT | Mod: HCNC,S$GLB,, | Performed by: PATHOLOGY

## 2021-09-14 PROCEDURE — 17003 DESTRUCTION, PREMALIGNANT LESIONS; SECOND THROUGH 14 LESIONS: ICD-10-PCS | Mod: HCNC,S$GLB,, | Performed by: PATHOLOGY

## 2021-09-14 PROCEDURE — 88305 TISSUE EXAM BY PATHOLOGIST: CPT | Mod: 59,HCNC | Performed by: DERMATOLOGY

## 2021-09-14 PROCEDURE — 17003 DESTRUCT PREMALG LES 2-14: CPT | Mod: HCNC,S$GLB,, | Performed by: PATHOLOGY

## 2021-09-14 PROCEDURE — 99213 PR OFFICE/OUTPT VISIT, EST, LEVL III, 20-29 MIN: ICD-10-PCS | Mod: 25,HCNC,S$GLB, | Performed by: PATHOLOGY

## 2021-09-14 PROCEDURE — 1101F PR PT FALLS ASSESS DOC 0-1 FALLS W/OUT INJ PAST YR: ICD-10-PCS | Mod: HCNC,CPTII,S$GLB, | Performed by: PATHOLOGY

## 2021-09-14 PROCEDURE — 17000 DESTRUCT PREMALG LESION: CPT | Mod: 59,HCNC,S$GLB, | Performed by: PATHOLOGY

## 2021-09-14 PROCEDURE — 4010F PR ACE/ARB THEARPY RXD/TAKEN: ICD-10-PCS | Mod: HCNC,CPTII,S$GLB, | Performed by: PATHOLOGY

## 2021-09-23 LAB
FINAL PATHOLOGIC DIAGNOSIS: NORMAL
GROSS: NORMAL
Lab: NORMAL
MICROSCOPIC EXAM: NORMAL

## 2021-09-27 ENCOUNTER — PATIENT OUTREACH (OUTPATIENT)
Dept: ADMINISTRATIVE | Facility: OTHER | Age: 71
End: 2021-09-27

## 2021-09-29 ENCOUNTER — HOSPITAL ENCOUNTER (OUTPATIENT)
Dept: RADIOLOGY | Facility: HOSPITAL | Age: 71
Discharge: HOME | End: 2021-09-29
Attending: INTERNAL MEDICINE
Payer: MEDICARE

## 2021-09-29 ENCOUNTER — OFFICE VISIT (OUTPATIENT)
Dept: RHEUMATOLOGY | Facility: CLINIC | Age: 71
End: 2021-09-29
Payer: MEDICARE

## 2021-09-29 VITALS — HEIGHT: 74 IN | WEIGHT: 213 LBS | BODY MASS INDEX: 27.34 KG/M2

## 2021-09-29 DIAGNOSIS — N18.2 CHRONIC RENAL IMPAIRMENT, STAGE 2 (MILD): ICD-10-CM

## 2021-09-29 DIAGNOSIS — Z87.19 HISTORY OF ULCERATIVE COLITIS: ICD-10-CM

## 2021-09-29 DIAGNOSIS — M17.11 PRIMARY OSTEOARTHRITIS OF RIGHT KNEE: ICD-10-CM

## 2021-09-29 DIAGNOSIS — M1A.09X1 IDIOPATHIC CHRONIC GOUT OF MULTIPLE SITES WITH TOPHUS: Primary | ICD-10-CM

## 2021-09-29 DIAGNOSIS — M24.849 LOCKING FINGER JOINT: ICD-10-CM

## 2021-09-29 DIAGNOSIS — G63 POLYNEUROPATHY ASSOCIATED WITH UNDERLYING DISEASE: ICD-10-CM

## 2021-09-29 PROCEDURE — 4010F ACE/ARB THERAPY RXD/TAKEN: CPT | Mod: HCNC,CPTII,S$GLB, | Performed by: INTERNAL MEDICINE

## 2021-09-29 PROCEDURE — 73130 X-RAY EXAM OF HAND: CPT | Mod: TC,HCNC,RT

## 2021-09-29 PROCEDURE — 1101F PR PT FALLS ASSESS DOC 0-1 FALLS W/OUT INJ PAST YR: ICD-10-PCS | Mod: HCNC,CPTII,S$GLB, | Performed by: INTERNAL MEDICINE

## 2021-09-29 PROCEDURE — 1160F PR REVIEW ALL MEDS BY PRESCRIBER/CLIN PHARMACIST DOCUMENTED: ICD-10-PCS | Mod: HCNC,CPTII,S$GLB, | Performed by: INTERNAL MEDICINE

## 2021-09-29 PROCEDURE — 3044F HG A1C LEVEL LT 7.0%: CPT | Mod: HCNC,CPTII,S$GLB, | Performed by: INTERNAL MEDICINE

## 2021-09-29 PROCEDURE — 3066F PR DOCUMENTATION OF TREATMENT FOR NEPHROPATHY: ICD-10-PCS | Mod: HCNC,CPTII,S$GLB, | Performed by: INTERNAL MEDICINE

## 2021-09-29 PROCEDURE — 3066F NEPHROPATHY DOC TX: CPT | Mod: HCNC,CPTII,S$GLB, | Performed by: INTERNAL MEDICINE

## 2021-09-29 PROCEDURE — 99999 PR PBB SHADOW E&M-EST. PATIENT-LVL V: ICD-10-PCS | Mod: PBBFAC,HCNC,, | Performed by: INTERNAL MEDICINE

## 2021-09-29 PROCEDURE — 99999 PR PBB SHADOW E&M-EST. PATIENT-LVL V: CPT | Mod: PBBFAC,HCNC,, | Performed by: INTERNAL MEDICINE

## 2021-09-29 PROCEDURE — 3288F PR FALLS RISK ASSESSMENT DOCUMENTED: ICD-10-PCS | Mod: HCNC,CPTII,S$GLB, | Performed by: INTERNAL MEDICINE

## 2021-09-29 PROCEDURE — 4010F PR ACE/ARB THEARPY RXD/TAKEN: ICD-10-PCS | Mod: HCNC,CPTII,S$GLB, | Performed by: INTERNAL MEDICINE

## 2021-09-29 PROCEDURE — 3008F BODY MASS INDEX DOCD: CPT | Mod: HCNC,CPTII,S$GLB, | Performed by: INTERNAL MEDICINE

## 2021-09-29 PROCEDURE — 1125F PR PAIN SEVERITY QUANTIFIED, PAIN PRESENT: ICD-10-PCS | Mod: HCNC,CPTII,S$GLB, | Performed by: INTERNAL MEDICINE

## 2021-09-29 PROCEDURE — 73130 X-RAY EXAM OF HAND: CPT | Mod: 26,HCNC,RT, | Performed by: RADIOLOGY

## 2021-09-29 PROCEDURE — 3288F FALL RISK ASSESSMENT DOCD: CPT | Mod: HCNC,CPTII,S$GLB, | Performed by: INTERNAL MEDICINE

## 2021-09-29 PROCEDURE — 99214 PR OFFICE/OUTPT VISIT, EST, LEVL IV, 30-39 MIN: ICD-10-PCS | Mod: HCNC,S$GLB,, | Performed by: INTERNAL MEDICINE

## 2021-09-29 PROCEDURE — 1159F PR MEDICATION LIST DOCUMENTED IN MEDICAL RECORD: ICD-10-PCS | Mod: HCNC,CPTII,S$GLB, | Performed by: INTERNAL MEDICINE

## 2021-09-29 PROCEDURE — 3008F PR BODY MASS INDEX (BMI) DOCUMENTED: ICD-10-PCS | Mod: HCNC,CPTII,S$GLB, | Performed by: INTERNAL MEDICINE

## 2021-09-29 PROCEDURE — 1125F AMNT PAIN NOTED PAIN PRSNT: CPT | Mod: HCNC,CPTII,S$GLB, | Performed by: INTERNAL MEDICINE

## 2021-09-29 PROCEDURE — 1160F RVW MEDS BY RX/DR IN RCRD: CPT | Mod: HCNC,CPTII,S$GLB, | Performed by: INTERNAL MEDICINE

## 2021-09-29 PROCEDURE — 1159F MED LIST DOCD IN RCRD: CPT | Mod: HCNC,CPTII,S$GLB, | Performed by: INTERNAL MEDICINE

## 2021-09-29 PROCEDURE — 73130 XR HAND COMPLETE 3 VIEW RIGHT: ICD-10-PCS | Mod: 26,HCNC,RT, | Performed by: RADIOLOGY

## 2021-09-29 PROCEDURE — 99214 OFFICE O/P EST MOD 30 MIN: CPT | Mod: HCNC,S$GLB,, | Performed by: INTERNAL MEDICINE

## 2021-09-29 PROCEDURE — 1101F PT FALLS ASSESS-DOCD LE1/YR: CPT | Mod: HCNC,CPTII,S$GLB, | Performed by: INTERNAL MEDICINE

## 2021-09-29 PROCEDURE — 3044F PR MOST RECENT HEMOGLOBIN A1C LEVEL <7.0%: ICD-10-PCS | Mod: HCNC,CPTII,S$GLB, | Performed by: INTERNAL MEDICINE

## 2021-10-01 ENCOUNTER — IMMUNIZATION (OUTPATIENT)
Dept: PRIMARY CARE CLINIC | Facility: CLINIC | Age: 71
End: 2021-10-01
Payer: MEDICARE

## 2021-10-01 DIAGNOSIS — Z23 NEED FOR VACCINATION: Primary | ICD-10-CM

## 2021-10-01 PROCEDURE — 91300 COVID-19, MRNA, LNP-S, PF, 30 MCG/0.3 ML DOSE VACCINE: CPT | Mod: HCNC,PBBFAC | Performed by: FAMILY MEDICINE

## 2021-10-05 ENCOUNTER — TELEPHONE (OUTPATIENT)
Dept: GASTROENTEROLOGY | Facility: CLINIC | Age: 71
End: 2021-10-05

## 2021-10-06 ENCOUNTER — OFFICE VISIT (OUTPATIENT)
Dept: GASTROENTEROLOGY | Facility: CLINIC | Age: 71
End: 2021-10-06
Payer: MEDICARE

## 2021-10-06 VITALS
BODY MASS INDEX: 26.18 KG/M2 | SYSTOLIC BLOOD PRESSURE: 115 MMHG | HEIGHT: 74 IN | WEIGHT: 204 LBS | DIASTOLIC BLOOD PRESSURE: 79 MMHG | HEART RATE: 67 BPM

## 2021-10-06 DIAGNOSIS — Z87.19 HISTORY OF ULCERATIVE COLITIS: ICD-10-CM

## 2021-10-06 DIAGNOSIS — K52.9 CHRONIC DIARRHEA: Primary | ICD-10-CM

## 2021-10-06 DIAGNOSIS — R19.8 HIGH OUTPUT ILEOSTOMY: ICD-10-CM

## 2021-10-06 DIAGNOSIS — Z93.2 HIGH OUTPUT ILEOSTOMY: ICD-10-CM

## 2021-10-06 DIAGNOSIS — K58.0 IRRITABLE BOWEL SYNDROME WITH DIARRHEA: ICD-10-CM

## 2021-10-06 DIAGNOSIS — R14.0 BLOATING: ICD-10-CM

## 2021-10-06 DIAGNOSIS — Z93.2 ILEOSTOMY IN PLACE: ICD-10-CM

## 2021-10-06 DIAGNOSIS — R10.9 ABDOMINAL CRAMPING: ICD-10-CM

## 2021-10-06 PROBLEM — K56.600 PARTIAL SMALL BOWEL OBSTRUCTION: Status: RESOLVED | Noted: 2020-09-25 | Resolved: 2021-10-06

## 2021-10-06 PROCEDURE — 99205 PR OFFICE/OUTPT VISIT, NEW, LEVL V, 60-74 MIN: ICD-10-PCS | Mod: HCNC,S$GLB,, | Performed by: INTERNAL MEDICINE

## 2021-10-06 PROCEDURE — 99205 OFFICE O/P NEW HI 60 MIN: CPT | Mod: HCNC,S$GLB,, | Performed by: INTERNAL MEDICINE

## 2021-10-06 PROCEDURE — 1160F RVW MEDS BY RX/DR IN RCRD: CPT | Mod: HCNC,CPTII,S$GLB, | Performed by: INTERNAL MEDICINE

## 2021-10-06 PROCEDURE — 1160F PR REVIEW ALL MEDS BY PRESCRIBER/CLIN PHARMACIST DOCUMENTED: ICD-10-PCS | Mod: HCNC,CPTII,S$GLB, | Performed by: INTERNAL MEDICINE

## 2021-10-06 PROCEDURE — 99499 RISK ADDL DX/OHS AUDIT: ICD-10-PCS | Mod: S$GLB,,, | Performed by: INTERNAL MEDICINE

## 2021-10-06 PROCEDURE — 3008F PR BODY MASS INDEX (BMI) DOCUMENTED: ICD-10-PCS | Mod: HCNC,CPTII,S$GLB, | Performed by: INTERNAL MEDICINE

## 2021-10-06 PROCEDURE — 1101F PT FALLS ASSESS-DOCD LE1/YR: CPT | Mod: HCNC,CPTII,S$GLB, | Performed by: INTERNAL MEDICINE

## 2021-10-06 PROCEDURE — 3066F NEPHROPATHY DOC TX: CPT | Mod: HCNC,CPTII,S$GLB, | Performed by: INTERNAL MEDICINE

## 2021-10-06 PROCEDURE — 1125F AMNT PAIN NOTED PAIN PRSNT: CPT | Mod: HCNC,CPTII,S$GLB, | Performed by: INTERNAL MEDICINE

## 2021-10-06 PROCEDURE — 3074F PR MOST RECENT SYSTOLIC BLOOD PRESSURE < 130 MM HG: ICD-10-PCS | Mod: HCNC,CPTII,S$GLB, | Performed by: INTERNAL MEDICINE

## 2021-10-06 PROCEDURE — 4010F ACE/ARB THERAPY RXD/TAKEN: CPT | Mod: HCNC,CPTII,S$GLB, | Performed by: INTERNAL MEDICINE

## 2021-10-06 PROCEDURE — 3288F FALL RISK ASSESSMENT DOCD: CPT | Mod: HCNC,CPTII,S$GLB, | Performed by: INTERNAL MEDICINE

## 2021-10-06 PROCEDURE — 3044F PR MOST RECENT HEMOGLOBIN A1C LEVEL <7.0%: ICD-10-PCS | Mod: HCNC,CPTII,S$GLB, | Performed by: INTERNAL MEDICINE

## 2021-10-06 PROCEDURE — 3044F HG A1C LEVEL LT 7.0%: CPT | Mod: HCNC,CPTII,S$GLB, | Performed by: INTERNAL MEDICINE

## 2021-10-06 PROCEDURE — 4010F PR ACE/ARB THEARPY RXD/TAKEN: ICD-10-PCS | Mod: HCNC,CPTII,S$GLB, | Performed by: INTERNAL MEDICINE

## 2021-10-06 PROCEDURE — 3078F DIAST BP <80 MM HG: CPT | Mod: HCNC,CPTII,S$GLB, | Performed by: INTERNAL MEDICINE

## 2021-10-06 PROCEDURE — 3288F PR FALLS RISK ASSESSMENT DOCUMENTED: ICD-10-PCS | Mod: HCNC,CPTII,S$GLB, | Performed by: INTERNAL MEDICINE

## 2021-10-06 PROCEDURE — 3066F PR DOCUMENTATION OF TREATMENT FOR NEPHROPATHY: ICD-10-PCS | Mod: HCNC,CPTII,S$GLB, | Performed by: INTERNAL MEDICINE

## 2021-10-06 PROCEDURE — 3078F PR MOST RECENT DIASTOLIC BLOOD PRESSURE < 80 MM HG: ICD-10-PCS | Mod: HCNC,CPTII,S$GLB, | Performed by: INTERNAL MEDICINE

## 2021-10-06 PROCEDURE — 1159F PR MEDICATION LIST DOCUMENTED IN MEDICAL RECORD: ICD-10-PCS | Mod: HCNC,CPTII,S$GLB, | Performed by: INTERNAL MEDICINE

## 2021-10-06 PROCEDURE — 3074F SYST BP LT 130 MM HG: CPT | Mod: HCNC,CPTII,S$GLB, | Performed by: INTERNAL MEDICINE

## 2021-10-06 PROCEDURE — 1101F PR PT FALLS ASSESS DOC 0-1 FALLS W/OUT INJ PAST YR: ICD-10-PCS | Mod: HCNC,CPTII,S$GLB, | Performed by: INTERNAL MEDICINE

## 2021-10-06 PROCEDURE — 1159F MED LIST DOCD IN RCRD: CPT | Mod: HCNC,CPTII,S$GLB, | Performed by: INTERNAL MEDICINE

## 2021-10-06 PROCEDURE — 3008F BODY MASS INDEX DOCD: CPT | Mod: HCNC,CPTII,S$GLB, | Performed by: INTERNAL MEDICINE

## 2021-10-06 PROCEDURE — 1125F PR PAIN SEVERITY QUANTIFIED, PAIN PRESENT: ICD-10-PCS | Mod: HCNC,CPTII,S$GLB, | Performed by: INTERNAL MEDICINE

## 2021-10-06 PROCEDURE — 99499 UNLISTED E&M SERVICE: CPT | Mod: S$GLB,,, | Performed by: INTERNAL MEDICINE

## 2021-10-06 PROCEDURE — 99999 PR PBB SHADOW E&M-EST. PATIENT-LVL V: CPT | Mod: PBBFAC,HCNC,, | Performed by: INTERNAL MEDICINE

## 2021-10-06 PROCEDURE — 99999 PR PBB SHADOW E&M-EST. PATIENT-LVL V: ICD-10-PCS | Mod: PBBFAC,HCNC,, | Performed by: INTERNAL MEDICINE

## 2021-10-06 RX ORDER — GLIMEPIRIDE 4 MG/1
4 TABLET ORAL
COMMUNITY
End: 2023-04-04 | Stop reason: SDUPTHER

## 2021-10-12 ENCOUNTER — OFFICE VISIT (OUTPATIENT)
Dept: UROLOGY | Facility: CLINIC | Age: 71
End: 2021-10-12
Payer: MEDICARE

## 2021-10-12 ENCOUNTER — TELEPHONE (OUTPATIENT)
Dept: PRIMARY CARE CLINIC | Facility: CLINIC | Age: 71
End: 2021-10-12

## 2021-10-12 ENCOUNTER — CLINICAL SUPPORT (OUTPATIENT)
Dept: PRIMARY CARE CLINIC | Facility: CLINIC | Age: 71
End: 2021-10-12
Payer: MEDICARE

## 2021-10-12 VITALS
BODY MASS INDEX: 26.6 KG/M2 | WEIGHT: 207.25 LBS | HEIGHT: 74 IN | HEART RATE: 66 BPM | DIASTOLIC BLOOD PRESSURE: 70 MMHG | SYSTOLIC BLOOD PRESSURE: 117 MMHG

## 2021-10-12 DIAGNOSIS — Z23 NEED FOR VACCINATION: Primary | ICD-10-CM

## 2021-10-12 DIAGNOSIS — C61 PROSTATE CANCER: Primary | ICD-10-CM

## 2021-10-12 PROCEDURE — 1101F PT FALLS ASSESS-DOCD LE1/YR: CPT | Mod: HCNC,CPTII,S$GLB, | Performed by: UROLOGY

## 2021-10-12 PROCEDURE — 3078F DIAST BP <80 MM HG: CPT | Mod: HCNC,CPTII,S$GLB, | Performed by: UROLOGY

## 2021-10-12 PROCEDURE — 3074F PR MOST RECENT SYSTOLIC BLOOD PRESSURE < 130 MM HG: ICD-10-PCS | Mod: HCNC,CPTII,S$GLB, | Performed by: UROLOGY

## 2021-10-12 PROCEDURE — 1159F MED LIST DOCD IN RCRD: CPT | Mod: HCNC,CPTII,S$GLB, | Performed by: UROLOGY

## 2021-10-12 PROCEDURE — 90694 FLU VACCINE - QUADRIVALENT - ADJUVANTED: ICD-10-PCS | Mod: HCNC,S$GLB,, | Performed by: INTERNAL MEDICINE

## 2021-10-12 PROCEDURE — 3074F SYST BP LT 130 MM HG: CPT | Mod: HCNC,CPTII,S$GLB, | Performed by: UROLOGY

## 2021-10-12 PROCEDURE — 1159F PR MEDICATION LIST DOCUMENTED IN MEDICAL RECORD: ICD-10-PCS | Mod: HCNC,CPTII,S$GLB, | Performed by: UROLOGY

## 2021-10-12 PROCEDURE — 99999 PR PBB SHADOW E&M-EST. PATIENT-LVL IV: ICD-10-PCS | Mod: PBBFAC,HCNC,, | Performed by: UROLOGY

## 2021-10-12 PROCEDURE — 90694 VACC AIIV4 NO PRSRV 0.5ML IM: CPT | Mod: HCNC,S$GLB,, | Performed by: INTERNAL MEDICINE

## 2021-10-12 PROCEDURE — 1126F PR PAIN SEVERITY QUANTIFIED, NO PAIN PRESENT: ICD-10-PCS | Mod: HCNC,CPTII,S$GLB, | Performed by: UROLOGY

## 2021-10-12 PROCEDURE — 99999 PR PBB SHADOW E&M-EST. PATIENT-LVL IV: CPT | Mod: PBBFAC,HCNC,, | Performed by: UROLOGY

## 2021-10-12 PROCEDURE — 3066F NEPHROPATHY DOC TX: CPT | Mod: HCNC,CPTII,S$GLB, | Performed by: UROLOGY

## 2021-10-12 PROCEDURE — 99214 OFFICE O/P EST MOD 30 MIN: CPT | Mod: HCNC,S$GLB,, | Performed by: UROLOGY

## 2021-10-12 PROCEDURE — G0008 FLU VACCINE - QUADRIVALENT - ADJUVANTED: ICD-10-PCS | Mod: HCNC,S$GLB,, | Performed by: INTERNAL MEDICINE

## 2021-10-12 PROCEDURE — 4010F ACE/ARB THERAPY RXD/TAKEN: CPT | Mod: HCNC,CPTII,S$GLB, | Performed by: UROLOGY

## 2021-10-12 PROCEDURE — 99499 UNLISTED E&M SERVICE: CPT | Mod: S$GLB,,, | Performed by: UROLOGY

## 2021-10-12 PROCEDURE — 3288F PR FALLS RISK ASSESSMENT DOCUMENTED: ICD-10-PCS | Mod: HCNC,CPTII,S$GLB, | Performed by: UROLOGY

## 2021-10-12 PROCEDURE — 1126F AMNT PAIN NOTED NONE PRSNT: CPT | Mod: HCNC,CPTII,S$GLB, | Performed by: UROLOGY

## 2021-10-12 PROCEDURE — 3078F PR MOST RECENT DIASTOLIC BLOOD PRESSURE < 80 MM HG: ICD-10-PCS | Mod: HCNC,CPTII,S$GLB, | Performed by: UROLOGY

## 2021-10-12 PROCEDURE — 1101F PR PT FALLS ASSESS DOC 0-1 FALLS W/OUT INJ PAST YR: ICD-10-PCS | Mod: HCNC,CPTII,S$GLB, | Performed by: UROLOGY

## 2021-10-12 PROCEDURE — G0008 ADMIN INFLUENZA VIRUS VAC: HCPCS | Mod: HCNC,S$GLB,, | Performed by: INTERNAL MEDICINE

## 2021-10-12 PROCEDURE — 3044F PR MOST RECENT HEMOGLOBIN A1C LEVEL <7.0%: ICD-10-PCS | Mod: HCNC,CPTII,S$GLB, | Performed by: UROLOGY

## 2021-10-12 PROCEDURE — 99499 RISK ADDL DX/OHS AUDIT: ICD-10-PCS | Mod: S$GLB,,, | Performed by: UROLOGY

## 2021-10-12 PROCEDURE — 3008F BODY MASS INDEX DOCD: CPT | Mod: HCNC,CPTII,S$GLB, | Performed by: UROLOGY

## 2021-10-12 PROCEDURE — 99214 PR OFFICE/OUTPT VISIT, EST, LEVL IV, 30-39 MIN: ICD-10-PCS | Mod: HCNC,S$GLB,, | Performed by: UROLOGY

## 2021-10-12 PROCEDURE — 3044F HG A1C LEVEL LT 7.0%: CPT | Mod: HCNC,CPTII,S$GLB, | Performed by: UROLOGY

## 2021-10-12 PROCEDURE — 4010F PR ACE/ARB THEARPY RXD/TAKEN: ICD-10-PCS | Mod: HCNC,CPTII,S$GLB, | Performed by: UROLOGY

## 2021-10-12 PROCEDURE — 3066F PR DOCUMENTATION OF TREATMENT FOR NEPHROPATHY: ICD-10-PCS | Mod: HCNC,CPTII,S$GLB, | Performed by: UROLOGY

## 2021-10-12 PROCEDURE — 3288F FALL RISK ASSESSMENT DOCD: CPT | Mod: HCNC,CPTII,S$GLB, | Performed by: UROLOGY

## 2021-10-12 PROCEDURE — 3008F PR BODY MASS INDEX (BMI) DOCUMENTED: ICD-10-PCS | Mod: HCNC,CPTII,S$GLB, | Performed by: UROLOGY

## 2021-10-12 RX ORDER — CALCIUM CITRATE/VITAMIN D3 315MG-6.25
1 TABLET ORAL DAILY
Qty: 120 TABLET | Refills: 3 | Status: ON HOLD | OUTPATIENT
Start: 2021-10-12 | End: 2023-05-29 | Stop reason: HOSPADM

## 2021-10-13 ENCOUNTER — TELEPHONE (OUTPATIENT)
Dept: ENDOSCOPY | Facility: HOSPITAL | Age: 71
End: 2021-10-13

## 2021-10-13 RX ORDER — ELUXADOLINE 100 MG/1
100 TABLET, FILM COATED ORAL 2 TIMES DAILY
Qty: 60 TABLET | Refills: 0 | Status: SHIPPED | OUTPATIENT
Start: 2021-10-13 | End: 2021-11-12

## 2021-10-22 ENCOUNTER — TELEPHONE (OUTPATIENT)
Dept: MEDSURG UNIT | Facility: HOSPITAL | Age: 71
End: 2021-10-22

## 2021-10-22 ENCOUNTER — TELEPHONE (OUTPATIENT)
Dept: GASTROENTEROLOGY | Facility: CLINIC | Age: 71
End: 2021-10-22

## 2021-11-04 ENCOUNTER — PROCEDURE VISIT (OUTPATIENT)
Dept: DERMATOLOGY | Facility: CLINIC | Age: 71
End: 2021-11-04
Payer: MEDICARE

## 2021-11-04 ENCOUNTER — PATIENT OUTREACH (OUTPATIENT)
Dept: ADMINISTRATIVE | Facility: OTHER | Age: 71
End: 2021-11-04
Payer: MEDICARE

## 2021-11-04 ENCOUNTER — TELEPHONE (OUTPATIENT)
Dept: PRIMARY CARE CLINIC | Facility: CLINIC | Age: 71
End: 2021-11-04
Payer: MEDICARE

## 2021-11-04 VITALS
HEART RATE: 78 BPM | DIASTOLIC BLOOD PRESSURE: 96 MMHG | SYSTOLIC BLOOD PRESSURE: 148 MMHG | BODY MASS INDEX: 26.56 KG/M2 | HEIGHT: 74 IN | WEIGHT: 207 LBS

## 2021-11-04 DIAGNOSIS — C44.629 SQUAMOUS CELL CARCINOMA OF LEFT HAND: Primary | ICD-10-CM

## 2021-11-04 DIAGNOSIS — C44.622 SQUAMOUS CELL CARCINOMA OF RIGHT UPPER EXTREMITY: ICD-10-CM

## 2021-11-04 PROCEDURE — 13132 PR RECMPL WND HEAD,FAC,HAND 2.6-7.5 CM: ICD-10-PCS | Mod: HCNC,S$GLB,, | Performed by: DERMATOLOGY

## 2021-11-04 PROCEDURE — 13132 CMPLX RPR F/C/C/M/N/AX/G/H/F: CPT | Mod: HCNC,S$GLB,, | Performed by: DERMATOLOGY

## 2021-11-04 PROCEDURE — 17313 MOHS 1 STAGE T/A/L: CPT | Mod: XS,51,HCNC,S$GLB | Performed by: DERMATOLOGY

## 2021-11-04 PROCEDURE — 17313: ICD-10-PCS | Mod: XS,51,HCNC,S$GLB | Performed by: DERMATOLOGY

## 2021-11-04 PROCEDURE — 13121 CMPLX RPR S/A/L 2.6-7.5 CM: CPT | Mod: XS,HCNC,S$GLB, | Performed by: DERMATOLOGY

## 2021-11-04 PROCEDURE — 13121 PR RECMPL WND SCALP,EXTR 2.6-7.5 CM: ICD-10-PCS | Mod: XS,HCNC,S$GLB, | Performed by: DERMATOLOGY

## 2021-11-04 PROCEDURE — 17311: ICD-10-PCS | Mod: HCNC,S$GLB,, | Performed by: DERMATOLOGY

## 2021-11-04 PROCEDURE — 99499 UNLISTED E&M SERVICE: CPT | Mod: HCNC,S$GLB,, | Performed by: DERMATOLOGY

## 2021-11-04 PROCEDURE — 17311 MOHS 1 STAGE H/N/HF/G: CPT | Mod: HCNC,S$GLB,, | Performed by: DERMATOLOGY

## 2021-11-04 PROCEDURE — 99499 NO LOS: ICD-10-PCS | Mod: HCNC,S$GLB,, | Performed by: DERMATOLOGY

## 2021-11-05 ENCOUNTER — TELEPHONE (OUTPATIENT)
Dept: NEPHROLOGY | Facility: CLINIC | Age: 71
End: 2021-11-05
Payer: MEDICARE

## 2021-11-15 ENCOUNTER — TELEPHONE (OUTPATIENT)
Dept: ENDOSCOPY | Facility: HOSPITAL | Age: 71
End: 2021-11-15
Payer: MEDICARE

## 2021-11-18 ENCOUNTER — PROCEDURE VISIT (OUTPATIENT)
Dept: DERMATOLOGY | Facility: CLINIC | Age: 71
End: 2021-11-18
Payer: MEDICARE

## 2021-11-18 ENCOUNTER — OFFICE VISIT (OUTPATIENT)
Dept: DERMATOLOGY | Facility: CLINIC | Age: 71
End: 2021-11-18
Payer: MEDICARE

## 2021-11-18 ENCOUNTER — TELEPHONE (OUTPATIENT)
Dept: PRIMARY CARE CLINIC | Facility: CLINIC | Age: 71
End: 2021-11-18
Payer: MEDICARE

## 2021-11-18 VITALS
HEART RATE: 70 BPM | WEIGHT: 207 LBS | HEIGHT: 74 IN | DIASTOLIC BLOOD PRESSURE: 91 MMHG | BODY MASS INDEX: 26.56 KG/M2 | SYSTOLIC BLOOD PRESSURE: 158 MMHG

## 2021-11-18 DIAGNOSIS — Z85.828 HISTORY OF NONMELANOMA SKIN CANCER: ICD-10-CM

## 2021-11-18 DIAGNOSIS — C44.629 SQUAMOUS CELL CARCINOMA OF LEFT UPPER EXTREMITY: Primary | ICD-10-CM

## 2021-11-18 DIAGNOSIS — L57.0 MULTIPLE ACTINIC KERATOSES: ICD-10-CM

## 2021-11-18 DIAGNOSIS — D48.5 NEOPLASM OF UNCERTAIN BEHAVIOR OF SKIN: Primary | ICD-10-CM

## 2021-11-18 PROCEDURE — 99499 UNLISTED E&M SERVICE: CPT | Mod: HCNC,S$GLB,, | Performed by: DERMATOLOGY

## 2021-11-18 PROCEDURE — 99999 PR PBB SHADOW E&M-EST. PATIENT-LVL IV: ICD-10-PCS | Mod: PBBFAC,HCNC,, | Performed by: PATHOLOGY

## 2021-11-18 PROCEDURE — 17003 DESTRUCT PREMALG LES 2-14: CPT | Mod: HCNC,S$GLB,, | Performed by: PATHOLOGY

## 2021-11-18 PROCEDURE — 3066F NEPHROPATHY DOC TX: CPT | Mod: HCNC,CPTII,S$GLB, | Performed by: PATHOLOGY

## 2021-11-18 PROCEDURE — 99213 PR OFFICE/OUTPT VISIT, EST, LEVL III, 20-29 MIN: ICD-10-PCS | Mod: 25,HCNC,S$GLB, | Performed by: PATHOLOGY

## 2021-11-18 PROCEDURE — 17003 DESTRUCTION, PREMALIGNANT LESIONS; SECOND THROUGH 14 LESIONS: ICD-10-PCS | Mod: HCNC,S$GLB,, | Performed by: PATHOLOGY

## 2021-11-18 PROCEDURE — 1101F PR PT FALLS ASSESS DOC 0-1 FALLS W/OUT INJ PAST YR: ICD-10-PCS | Mod: HCNC,CPTII,S$GLB, | Performed by: PATHOLOGY

## 2021-11-18 PROCEDURE — 3044F PR MOST RECENT HEMOGLOBIN A1C LEVEL <7.0%: ICD-10-PCS | Mod: HCNC,CPTII,S$GLB, | Performed by: PATHOLOGY

## 2021-11-18 PROCEDURE — 1159F MED LIST DOCD IN RCRD: CPT | Mod: HCNC,CPTII,S$GLB, | Performed by: PATHOLOGY

## 2021-11-18 PROCEDURE — 11102 PR TANGENTIAL BIOPSY, SKIN, SINGLE LESION: ICD-10-PCS | Mod: HCNC,S$GLB,, | Performed by: PATHOLOGY

## 2021-11-18 PROCEDURE — 88305 TISSUE EXAM BY PATHOLOGIST: CPT | Mod: 26,,, | Performed by: PATHOLOGY

## 2021-11-18 PROCEDURE — 3288F FALL RISK ASSESSMENT DOCD: CPT | Mod: HCNC,CPTII,S$GLB, | Performed by: PATHOLOGY

## 2021-11-18 PROCEDURE — 1126F PR PAIN SEVERITY QUANTIFIED, NO PAIN PRESENT: ICD-10-PCS | Mod: HCNC,CPTII,S$GLB, | Performed by: PATHOLOGY

## 2021-11-18 PROCEDURE — 1101F PT FALLS ASSESS-DOCD LE1/YR: CPT | Mod: HCNC,CPTII,S$GLB, | Performed by: PATHOLOGY

## 2021-11-18 PROCEDURE — 17000 PR DESTRUCTION(LASER SURGERY,CRYOSURGERY,CHEMOSURGERY),PREMALIGNANT LESIONS,FIRST LESION: ICD-10-PCS | Mod: HCNC,XS,S$GLB, | Performed by: PATHOLOGY

## 2021-11-18 PROCEDURE — 13121 CMPLX RPR S/A/L 2.6-7.5 CM: CPT | Mod: HCNC,51,S$GLB, | Performed by: DERMATOLOGY

## 2021-11-18 PROCEDURE — 13121 PR RECMPL WND SCALP,EXTR 2.6-7.5 CM: ICD-10-PCS | Mod: HCNC,51,S$GLB, | Performed by: DERMATOLOGY

## 2021-11-18 PROCEDURE — 17000 DESTRUCT PREMALG LESION: CPT | Mod: HCNC,XS,S$GLB, | Performed by: PATHOLOGY

## 2021-11-18 PROCEDURE — 3288F PR FALLS RISK ASSESSMENT DOCUMENTED: ICD-10-PCS | Mod: HCNC,CPTII,S$GLB, | Performed by: PATHOLOGY

## 2021-11-18 PROCEDURE — 1159F PR MEDICATION LIST DOCUMENTED IN MEDICAL RECORD: ICD-10-PCS | Mod: HCNC,CPTII,S$GLB, | Performed by: PATHOLOGY

## 2021-11-18 PROCEDURE — 88305 TISSUE EXAM BY PATHOLOGIST: CPT | Performed by: PATHOLOGY

## 2021-11-18 PROCEDURE — 88305 TISSUE EXAM BY PATHOLOGIST: ICD-10-PCS | Mod: 26,,, | Performed by: PATHOLOGY

## 2021-11-18 PROCEDURE — 99999 PR PBB SHADOW E&M-EST. PATIENT-LVL IV: CPT | Mod: PBBFAC,HCNC,, | Performed by: PATHOLOGY

## 2021-11-18 PROCEDURE — 17313 MOHS 1 STAGE T/A/L: CPT | Mod: HCNC,S$GLB,, | Performed by: DERMATOLOGY

## 2021-11-18 PROCEDURE — 1160F PR REVIEW ALL MEDS BY PRESCRIBER/CLIN PHARMACIST DOCUMENTED: ICD-10-PCS | Mod: HCNC,CPTII,S$GLB, | Performed by: PATHOLOGY

## 2021-11-18 PROCEDURE — 1126F AMNT PAIN NOTED NONE PRSNT: CPT | Mod: HCNC,CPTII,S$GLB, | Performed by: PATHOLOGY

## 2021-11-18 PROCEDURE — 4010F PR ACE/ARB THEARPY RXD/TAKEN: ICD-10-PCS | Mod: HCNC,CPTII,S$GLB, | Performed by: PATHOLOGY

## 2021-11-18 PROCEDURE — 17313: ICD-10-PCS | Mod: HCNC,S$GLB,, | Performed by: DERMATOLOGY

## 2021-11-18 PROCEDURE — 3044F HG A1C LEVEL LT 7.0%: CPT | Mod: HCNC,CPTII,S$GLB, | Performed by: PATHOLOGY

## 2021-11-18 PROCEDURE — 4010F ACE/ARB THERAPY RXD/TAKEN: CPT | Mod: HCNC,CPTII,S$GLB, | Performed by: PATHOLOGY

## 2021-11-18 PROCEDURE — 99499 NO LOS: ICD-10-PCS | Mod: HCNC,S$GLB,, | Performed by: DERMATOLOGY

## 2021-11-18 PROCEDURE — 11102 TANGNTL BX SKIN SINGLE LES: CPT | Mod: HCNC,S$GLB,, | Performed by: PATHOLOGY

## 2021-11-18 PROCEDURE — 99213 OFFICE O/P EST LOW 20 MIN: CPT | Mod: 25,HCNC,S$GLB, | Performed by: PATHOLOGY

## 2021-11-18 PROCEDURE — 1160F RVW MEDS BY RX/DR IN RCRD: CPT | Mod: HCNC,CPTII,S$GLB, | Performed by: PATHOLOGY

## 2021-11-18 PROCEDURE — 3066F PR DOCUMENTATION OF TREATMENT FOR NEPHROPATHY: ICD-10-PCS | Mod: HCNC,CPTII,S$GLB, | Performed by: PATHOLOGY

## 2021-11-19 ENCOUNTER — TELEPHONE (OUTPATIENT)
Dept: PRIMARY CARE CLINIC | Facility: CLINIC | Age: 71
End: 2021-11-19
Payer: MEDICARE

## 2021-11-22 ENCOUNTER — TELEPHONE (OUTPATIENT)
Dept: PRIMARY CARE CLINIC | Facility: CLINIC | Age: 71
End: 2021-11-22
Payer: MEDICARE

## 2021-11-23 ENCOUNTER — TELEPHONE (OUTPATIENT)
Dept: PRIMARY CARE CLINIC | Facility: CLINIC | Age: 71
End: 2021-11-23
Payer: MEDICARE

## 2021-11-23 ENCOUNTER — TELEPHONE (OUTPATIENT)
Dept: RHEUMATOLOGY | Facility: CLINIC | Age: 71
End: 2021-11-23
Payer: MEDICARE

## 2021-11-23 LAB
FINAL PATHOLOGIC DIAGNOSIS: NORMAL
GROSS: NORMAL
Lab: NORMAL
MICROSCOPIC EXAM: NORMAL

## 2021-11-23 RX ORDER — GABAPENTIN 600 MG/1
600 TABLET ORAL 2 TIMES DAILY
Qty: 180 TABLET | Refills: 3 | Status: SHIPPED | OUTPATIENT
Start: 2021-11-23 | End: 2022-10-31 | Stop reason: SDUPTHER

## 2021-11-23 RX ORDER — ALLOPURINOL 300 MG/1
450 TABLET ORAL DAILY
Qty: 135 TABLET | Refills: 3 | Status: SHIPPED | OUTPATIENT
Start: 2021-11-23 | End: 2022-09-09 | Stop reason: SDUPTHER

## 2021-11-29 ENCOUNTER — TELEPHONE (OUTPATIENT)
Dept: ENDOSCOPY | Facility: HOSPITAL | Age: 71
End: 2021-11-29
Payer: MEDICARE

## 2021-11-30 ENCOUNTER — TELEPHONE (OUTPATIENT)
Dept: GASTROENTEROLOGY | Facility: CLINIC | Age: 71
End: 2021-11-30
Payer: MEDICARE

## 2021-11-30 ENCOUNTER — OFFICE VISIT (OUTPATIENT)
Dept: RHEUMATOLOGY | Facility: CLINIC | Age: 71
End: 2021-11-30
Payer: MEDICARE

## 2021-11-30 VITALS — HEIGHT: 74 IN | BODY MASS INDEX: 26.95 KG/M2 | WEIGHT: 210 LBS

## 2021-11-30 DIAGNOSIS — M1A.09X1 IDIOPATHIC CHRONIC GOUT OF MULTIPLE SITES WITH TOPHUS: ICD-10-CM

## 2021-11-30 DIAGNOSIS — M65.4 TENDINITIS, DE QUERVAIN'S: Primary | ICD-10-CM

## 2021-11-30 DIAGNOSIS — M15.9 GENERALIZED OSTEOARTHRITIS: ICD-10-CM

## 2021-11-30 PROCEDURE — 99213 OFFICE O/P EST LOW 20 MIN: CPT | Mod: 25,HCNC,S$GLB, | Performed by: INTERNAL MEDICINE

## 2021-11-30 PROCEDURE — 3066F NEPHROPATHY DOC TX: CPT | Mod: HCNC,CPTII,S$GLB, | Performed by: INTERNAL MEDICINE

## 2021-11-30 PROCEDURE — 99999 PR PBB SHADOW E&M-EST. PATIENT-LVL III: CPT | Mod: PBBFAC,HCNC,, | Performed by: INTERNAL MEDICINE

## 2021-11-30 PROCEDURE — 20550 NJX 1 TENDON SHEATH/LIGAMENT: CPT | Mod: HCNC,RT,S$GLB, | Performed by: INTERNAL MEDICINE

## 2021-11-30 PROCEDURE — 4010F PR ACE/ARB THEARPY RXD/TAKEN: ICD-10-PCS | Mod: HCNC,CPTII,S$GLB, | Performed by: INTERNAL MEDICINE

## 2021-11-30 PROCEDURE — 4010F ACE/ARB THERAPY RXD/TAKEN: CPT | Mod: HCNC,CPTII,S$GLB, | Performed by: INTERNAL MEDICINE

## 2021-11-30 PROCEDURE — 3066F PR DOCUMENTATION OF TREATMENT FOR NEPHROPATHY: ICD-10-PCS | Mod: HCNC,CPTII,S$GLB, | Performed by: INTERNAL MEDICINE

## 2021-11-30 PROCEDURE — 99213 PR OFFICE/OUTPT VISIT, EST, LEVL III, 20-29 MIN: ICD-10-PCS | Mod: 25,HCNC,S$GLB, | Performed by: INTERNAL MEDICINE

## 2021-11-30 PROCEDURE — 99999 PR PBB SHADOW E&M-EST. PATIENT-LVL III: ICD-10-PCS | Mod: PBBFAC,HCNC,, | Performed by: INTERNAL MEDICINE

## 2021-11-30 PROCEDURE — 20550 TENDON SHEATH: ICD-10-PCS | Mod: HCNC,RT,S$GLB, | Performed by: INTERNAL MEDICINE

## 2021-11-30 RX ADMIN — METHYLPREDNISOLONE ACETATE 40 MG: 80 INJECTION, SUSPENSION INTRA-ARTICULAR; INTRALESIONAL; INTRAMUSCULAR; SOFT TISSUE at 08:11

## 2021-12-01 ENCOUNTER — OFFICE VISIT (OUTPATIENT)
Dept: NEPHROLOGY | Facility: CLINIC | Age: 71
End: 2021-12-01
Payer: MEDICARE

## 2021-12-01 ENCOUNTER — RESEARCH ENCOUNTER (OUTPATIENT)
Dept: RESEARCH | Facility: HOSPITAL | Age: 71
End: 2021-12-01
Payer: MEDICARE

## 2021-12-01 VITALS
OXYGEN SATURATION: 99 % | BODY MASS INDEX: 26.45 KG/M2 | HEIGHT: 74 IN | SYSTOLIC BLOOD PRESSURE: 130 MMHG | DIASTOLIC BLOOD PRESSURE: 80 MMHG | WEIGHT: 206.13 LBS | HEART RATE: 64 BPM

## 2021-12-01 DIAGNOSIS — I10 HYPERTENSION, UNSPECIFIED TYPE: ICD-10-CM

## 2021-12-01 DIAGNOSIS — I10 ESSENTIAL HYPERTENSION: ICD-10-CM

## 2021-12-01 DIAGNOSIS — N18.32 STAGE 3B CHRONIC KIDNEY DISEASE: Primary | ICD-10-CM

## 2021-12-01 DIAGNOSIS — K94.19 ALTERED BOWEL ELIMINATION DUE TO INTESTINAL OSTOMY: ICD-10-CM

## 2021-12-01 DIAGNOSIS — E87.5 HYPERKALEMIA: ICD-10-CM

## 2021-12-01 PROCEDURE — 99214 OFFICE O/P EST MOD 30 MIN: CPT | Mod: HCNC,S$GLB,, | Performed by: NURSE PRACTITIONER

## 2021-12-01 PROCEDURE — 99214 PR OFFICE/OUTPT VISIT, EST, LEVL IV, 30-39 MIN: ICD-10-PCS | Mod: HCNC,S$GLB,, | Performed by: NURSE PRACTITIONER

## 2021-12-01 PROCEDURE — 4010F ACE/ARB THERAPY RXD/TAKEN: CPT | Mod: HCNC,CPTII,S$GLB, | Performed by: NURSE PRACTITIONER

## 2021-12-01 PROCEDURE — 4010F PR ACE/ARB THEARPY RXD/TAKEN: ICD-10-PCS | Mod: HCNC,CPTII,S$GLB, | Performed by: NURSE PRACTITIONER

## 2021-12-01 PROCEDURE — 99999 PR PBB SHADOW E&M-EST. PATIENT-LVL V: ICD-10-PCS | Mod: PBBFAC,HCNC,, | Performed by: NURSE PRACTITIONER

## 2021-12-01 PROCEDURE — 99999 PR PBB SHADOW E&M-EST. PATIENT-LVL V: CPT | Mod: PBBFAC,HCNC,, | Performed by: NURSE PRACTITIONER

## 2021-12-01 PROCEDURE — 3066F NEPHROPATHY DOC TX: CPT | Mod: HCNC,CPTII,S$GLB, | Performed by: NURSE PRACTITIONER

## 2021-12-01 PROCEDURE — 3066F PR DOCUMENTATION OF TREATMENT FOR NEPHROPATHY: ICD-10-PCS | Mod: HCNC,CPTII,S$GLB, | Performed by: NURSE PRACTITIONER

## 2021-12-01 RX ORDER — METHYLPREDNISOLONE ACETATE 80 MG/ML
40 INJECTION, SUSPENSION INTRA-ARTICULAR; INTRALESIONAL; INTRAMUSCULAR; SOFT TISSUE
Status: DISCONTINUED | OUTPATIENT
Start: 2021-11-30 | End: 2021-12-01 | Stop reason: HOSPADM

## 2021-12-02 ENCOUNTER — OFFICE VISIT (OUTPATIENT)
Dept: DERMATOLOGY | Facility: CLINIC | Age: 71
End: 2021-12-02
Payer: MEDICARE

## 2021-12-02 DIAGNOSIS — Z09 POSTOP CHECK: Primary | ICD-10-CM

## 2021-12-02 PROCEDURE — 99024 POSTOP FOLLOW-UP VISIT: CPT | Mod: HCNC,S$GLB,, | Performed by: DERMATOLOGY

## 2021-12-02 PROCEDURE — 4010F ACE/ARB THERAPY RXD/TAKEN: CPT | Mod: HCNC,CPTII,S$GLB, | Performed by: DERMATOLOGY

## 2021-12-02 PROCEDURE — 3066F NEPHROPATHY DOC TX: CPT | Mod: HCNC,CPTII,S$GLB, | Performed by: DERMATOLOGY

## 2021-12-02 PROCEDURE — 99999 PR PBB SHADOW E&M-EST. PATIENT-LVL III: ICD-10-PCS | Mod: PBBFAC,HCNC,, | Performed by: DERMATOLOGY

## 2021-12-02 PROCEDURE — 4010F PR ACE/ARB THEARPY RXD/TAKEN: ICD-10-PCS | Mod: HCNC,CPTII,S$GLB, | Performed by: DERMATOLOGY

## 2021-12-02 PROCEDURE — 99999 PR PBB SHADOW E&M-EST. PATIENT-LVL III: CPT | Mod: PBBFAC,HCNC,, | Performed by: DERMATOLOGY

## 2021-12-02 PROCEDURE — 3066F PR DOCUMENTATION OF TREATMENT FOR NEPHROPATHY: ICD-10-PCS | Mod: HCNC,CPTII,S$GLB, | Performed by: DERMATOLOGY

## 2021-12-02 PROCEDURE — 99024 PR POST-OP FOLLOW-UP VISIT: ICD-10-PCS | Mod: HCNC,S$GLB,, | Performed by: DERMATOLOGY

## 2021-12-06 NOTE — PROGRESS NOTES
Subjective:       Patient ID: Jarrett Charlton Jr. is a 69 y.o. male.    Chief Complaint: Paperwork and Wellness Check Up    HPI patient is here for checkup and mainly to get a diabetic shoe patient has type 2 diabetes with chronic burning in both feet from diabetic neuropathy so far no skin breakdown in the feet any other area his diarrhea improved now and controlled with medication and diet colostomy is functioning well denies short of breath chest pain dyspnea with exertion only abdominal complain is bloating when he wake up in the morning it get better as the day go along  Review of Systems    Objective:      Physical Exam   Constitutional: He is oriented to person, place, and time. He appears well-developed and well-nourished. No distress.   HENT:   Head: Normocephalic and atraumatic.   Right Ear: External ear normal.   Left Ear: External ear normal.   Nose: Nose normal.   Mouth/Throat: Oropharynx is clear and moist. No oropharyngeal exudate.   Eyes: Pupils are equal, round, and reactive to light. Conjunctivae and EOM are normal. Right eye exhibits no discharge. Left eye exhibits no discharge.   Neck: Normal range of motion. Neck supple. No thyromegaly present.   Cardiovascular: Normal rate, regular rhythm, normal heart sounds and intact distal pulses. Exam reveals no gallop and no friction rub.   No murmur heard.  Pulses:       Dorsalis pedis pulses are 2+ on the right side, and 2+ on the left side.        Posterior tibial pulses are 2+ on the right side, and 2+ on the left side.   Pulmonary/Chest: Effort normal and breath sounds normal. No respiratory distress. He has no wheezes. He has no rales. He exhibits no tenderness.   Abdominal: Soft. Bowel sounds are normal. He exhibits no distension. There is no tenderness. There is no rebound and no guarding.   Musculoskeletal: Normal range of motion. He exhibits no edema, tenderness or deformity.        Right foot: There is normal range of motion and no deformity.         Left foot: There is normal range of motion and no deformity.   Feet:   Right Foot:   Protective Sensation: 3 sites tested. 3 sites sensed.   Skin Integrity: Positive for callus. Negative for ulcer, blister, skin breakdown or erythema.   Left Foot:   Protective Sensation: 3 sites tested. 3 sites sensed.   Skin Integrity: Positive for callus. Negative for ulcer, blister, skin breakdown or erythema.   Lymphadenopathy:     He has no cervical adenopathy.   Neurological: He is alert and oriented to person, place, and time.   Skin: Skin is warm and dry. Capillary refill takes less than 2 seconds. No rash noted. No erythema.   Psychiatric: He has a normal mood and affect. Judgment and thought content normal.   Nursing note and vitals reviewed.      Assessment:       1. Type 2 diabetes mellitus without complication, without long-term current use of insulin    2. Essential hypertension    3. Chronic painful diabetic neuropathy    4. Bloating    5. Encounter for diabetic foot exam    6. Prostate cancer screening    7. Encounter for screening for cardiovascular disorders        Plan:       Type 2 diabetes mellitus without complication, without long-term current use of insulin  Comments:  Well controlled with diet exercise medication last hemoglobin A1c 3 months ago 6.6  Orders:  -     Microalbumin/creatinine urine ratio; Future; Expected date: 04/09/2019  -     Hemoglobin A1c; Future; Expected date: 04/09/2019    Essential hypertension  Comments:  Well control with diet medication  Orders:  -     CBC auto differential; Future; Expected date: 04/09/2019  -     Comprehensive metabolic panel; Future; Expected date: 04/09/2019    Chronic painful diabetic neuropathy  Comments:  Patient need diabetic shoe    Bloating  Comments:  in am had colonoscopy EGD    Encounter for diabetic foot exam  Comments:  Early callus of the foot and diabetic neuropathy    Prostate cancer screening  -     PSA, Screening; Future; Expected date:  04/09/2019    Encounter for screening for cardiovascular disorders  -     Lipid panel; Future; Expected date: 04/09/2019    Other orders  -     simethicone 125 mg Tab; Take 1 tablet by mouth nightly as needed.  Dispense: 30 tablet; Refill: 1         no

## 2021-12-14 ENCOUNTER — TELEPHONE (OUTPATIENT)
Dept: UROLOGY | Facility: CLINIC | Age: 71
End: 2021-12-14
Payer: MEDICARE

## 2021-12-14 DIAGNOSIS — C61 PROSTATE CANCER: Primary | ICD-10-CM

## 2022-01-13 ENCOUNTER — OFFICE VISIT (OUTPATIENT)
Dept: DERMATOLOGY | Facility: CLINIC | Age: 72
End: 2022-01-13
Payer: MEDICARE

## 2022-01-13 DIAGNOSIS — Z85.828 HISTORY OF NONMELANOMA SKIN CANCER: ICD-10-CM

## 2022-01-13 DIAGNOSIS — L57.0 MULTIPLE ACTINIC KERATOSES: Primary | ICD-10-CM

## 2022-01-13 PROCEDURE — 99213 OFFICE O/P EST LOW 20 MIN: CPT | Mod: HCNC,S$GLB,, | Performed by: PATHOLOGY

## 2022-01-13 PROCEDURE — 3288F FALL RISK ASSESSMENT DOCD: CPT | Mod: HCNC,CPTII,S$GLB, | Performed by: PATHOLOGY

## 2022-01-13 PROCEDURE — 99999 PR PBB SHADOW E&M-EST. PATIENT-LVL III: ICD-10-PCS | Mod: PBBFAC,HCNC,, | Performed by: PATHOLOGY

## 2022-01-13 PROCEDURE — 1159F MED LIST DOCD IN RCRD: CPT | Mod: HCNC,CPTII,S$GLB, | Performed by: PATHOLOGY

## 2022-01-13 PROCEDURE — 1126F PR PAIN SEVERITY QUANTIFIED, NO PAIN PRESENT: ICD-10-PCS | Mod: HCNC,CPTII,S$GLB, | Performed by: PATHOLOGY

## 2022-01-13 PROCEDURE — 1126F AMNT PAIN NOTED NONE PRSNT: CPT | Mod: HCNC,CPTII,S$GLB, | Performed by: PATHOLOGY

## 2022-01-13 PROCEDURE — 99213 PR OFFICE/OUTPT VISIT, EST, LEVL III, 20-29 MIN: ICD-10-PCS | Mod: HCNC,S$GLB,, | Performed by: PATHOLOGY

## 2022-01-13 PROCEDURE — 99999 PR PBB SHADOW E&M-EST. PATIENT-LVL III: CPT | Mod: PBBFAC,HCNC,, | Performed by: PATHOLOGY

## 2022-01-13 PROCEDURE — 1101F PR PT FALLS ASSESS DOC 0-1 FALLS W/OUT INJ PAST YR: ICD-10-PCS | Mod: HCNC,CPTII,S$GLB, | Performed by: PATHOLOGY

## 2022-01-13 PROCEDURE — 1101F PT FALLS ASSESS-DOCD LE1/YR: CPT | Mod: HCNC,CPTII,S$GLB, | Performed by: PATHOLOGY

## 2022-01-13 PROCEDURE — 1160F RVW MEDS BY RX/DR IN RCRD: CPT | Mod: HCNC,CPTII,S$GLB, | Performed by: PATHOLOGY

## 2022-01-13 PROCEDURE — 1160F PR REVIEW ALL MEDS BY PRESCRIBER/CLIN PHARMACIST DOCUMENTED: ICD-10-PCS | Mod: HCNC,CPTII,S$GLB, | Performed by: PATHOLOGY

## 2022-01-13 PROCEDURE — 3288F PR FALLS RISK ASSESSMENT DOCUMENTED: ICD-10-PCS | Mod: HCNC,CPTII,S$GLB, | Performed by: PATHOLOGY

## 2022-01-13 PROCEDURE — 1159F PR MEDICATION LIST DOCUMENTED IN MEDICAL RECORD: ICD-10-PCS | Mod: HCNC,CPTII,S$GLB, | Performed by: PATHOLOGY

## 2022-01-13 NOTE — PROGRESS NOTES
Subjective:       Patient ID:  Jarrett Charlton Jr. is a 71 y.o. male who presents for   Chief Complaint   Patient presents with    Skin Check     Follow up      HPI  Pt with multiple HAKs, SCCIS and invasive SCCs to forearms.  Most recently had Mohs to left forearm lateral and medial by SSW in November 2021.  Completely 6 days of bid treatment to bilateral forearms and hands with Efudex/ Dovonex compound, last treatment about a week ago.  Had robust inflammatory response with some erosions and scabbing.  No new raised, indurated papules today.  Using CeraVe cream daily.    Review of Systems   Constitutional: Negative.  Negative for fever, chills, fatigue and malaise.   Skin: Positive for dry skin, sun sensitivity, daily sunscreen use, activity-related sunscreen use and wears hat. Negative for itching, rash and recent sunburn.   Hematologic/Lymphatic: Bruises/bleeds easily.        Objective:    Physical Exam   Constitutional: He appears well-developed and well-nourished.   Neurological: He is alert.   Psychiatric: He has a normal mood and affect.   Skin:   Areas Examined (abnormalities noted in diagram):   RUE Inspected  LUE Inspection Performed  Nails and Digits Inspection Performed              Diagram Legend     Erythematous scaling macule/papule c/w actinic keratosis       Vascular papule c/w angioma      Pigmented verrucoid papule/plaque c/w seborrheic keratosis      Yellow umbilicated papule c/w sebaceous hyperplasia      Irregularly shaped tan macule c/w lentigo     1-2 mm smooth white papules consistent with Milia      Movable subcutaneous cyst with punctum c/w epidermal inclusion cyst      Subcutaneous movable cyst c/w pilar cyst      Firm pink to brown papule c/w dermatofibroma      Pedunculated fleshy papule(s) c/w skin tag(s)      Evenly pigmented macule c/w junctional nevus     Mildly variegated pigmented, slightly irregular-bordered macule c/w mildly atypical nevus      Flesh colored to evenly  pigmented papule c/w intradermal nevus       Pink pearly papule/plaque c/w basal cell carcinoma      Erythematous hyperkeratotic cursted plaque c/w SCC      Surgical scar with no sign of skin cancer recurrence      Open and closed comedones      Inflammatory papules and pustules      Verrucoid papule consistent consistent with wart     Erythematous eczematous patches and plaques     Dystrophic onycholytic nail with subungual debris c/w onychomycosis     Umbilicated papule    Erythematous-base heme-crusted tan verrucoid plaque consistent with inflamed seborrheic keratosis     Erythematous Silvery Scaling Plaque c/w Psoriasis     See annotation      Assessment / Plan:        Multiple actinic keratoses - excellent, robust inflammatory response to Efudex/ Dovonex chemo cream.  Difficult to fully evaluate today due to recent treatment with residual erythema, erosions, crusting.  Continue aggressive moisturizing.  Wait 1 month for inflammation to resolve.  Re-treat bid for 5 days with Efudex Dovonex chemo cream to arms and hands prior to next visit.    History of nonmelanoma skin cancer - Area(s) of previous NMSC evaluated on bilateral forearms with no signs of recurrence.                   No follow-ups on file.

## 2022-01-19 ENCOUNTER — TELEPHONE (OUTPATIENT)
Dept: NEPHROLOGY | Facility: CLINIC | Age: 72
End: 2022-01-19
Payer: MEDICARE

## 2022-02-01 DIAGNOSIS — C61 PROSTATE CANCER: Primary | ICD-10-CM

## 2022-02-16 ENCOUNTER — OFFICE VISIT (OUTPATIENT)
Dept: PRIMARY CARE CLINIC | Facility: CLINIC | Age: 72
End: 2022-02-16
Payer: MEDICARE

## 2022-02-16 VITALS
OXYGEN SATURATION: 97 % | SYSTOLIC BLOOD PRESSURE: 122 MMHG | WEIGHT: 212 LBS | RESPIRATION RATE: 16 BRPM | HEIGHT: 74 IN | DIASTOLIC BLOOD PRESSURE: 88 MMHG | HEART RATE: 75 BPM | BODY MASS INDEX: 27.21 KG/M2

## 2022-02-16 DIAGNOSIS — F41.9 ANXIETY: ICD-10-CM

## 2022-02-16 DIAGNOSIS — Z12.5 SCREENING FOR PROSTATE CANCER: ICD-10-CM

## 2022-02-16 DIAGNOSIS — N40.0 BENIGN PROSTATIC HYPERPLASIA WITHOUT LOWER URINARY TRACT SYMPTOMS: ICD-10-CM

## 2022-02-16 DIAGNOSIS — E11.22 TYPE 2 DIABETES MELLITUS WITH STAGE 3B CHRONIC KIDNEY DISEASE, WITHOUT LONG-TERM CURRENT USE OF INSULIN: ICD-10-CM

## 2022-02-16 DIAGNOSIS — R19.8 HIGH OUTPUT ILEOSTOMY: ICD-10-CM

## 2022-02-16 DIAGNOSIS — I10 ESSENTIAL HYPERTENSION: ICD-10-CM

## 2022-02-16 DIAGNOSIS — N18.32 TYPE 2 DIABETES MELLITUS WITH STAGE 3B CHRONIC KIDNEY DISEASE, WITHOUT LONG-TERM CURRENT USE OF INSULIN: ICD-10-CM

## 2022-02-16 DIAGNOSIS — K52.9 CHRONIC DIARRHEA: ICD-10-CM

## 2022-02-16 DIAGNOSIS — M54.2 NECK PAIN ON LEFT SIDE: Primary | ICD-10-CM

## 2022-02-16 DIAGNOSIS — Z93.2 HIGH OUTPUT ILEOSTOMY: ICD-10-CM

## 2022-02-16 DIAGNOSIS — E11.9 ENCOUNTER FOR DIABETIC FOOT EXAM: ICD-10-CM

## 2022-02-16 PROCEDURE — 1126F PR PAIN SEVERITY QUANTIFIED, NO PAIN PRESENT: ICD-10-PCS | Mod: HCNC,CPTII,S$GLB, | Performed by: INTERNAL MEDICINE

## 2022-02-16 PROCEDURE — 3079F DIAST BP 80-89 MM HG: CPT | Mod: HCNC,CPTII,S$GLB, | Performed by: INTERNAL MEDICINE

## 2022-02-16 PROCEDURE — 1100F PR PT FALLS ASSESS DOC 2+ FALLS/FALL W/INJURY/YR: ICD-10-PCS | Mod: HCNC,CPTII,S$GLB, | Performed by: INTERNAL MEDICINE

## 2022-02-16 PROCEDURE — 99999 PR PBB SHADOW E&M-EST. PATIENT-LVL V: CPT | Mod: PBBFAC,HCNC,, | Performed by: INTERNAL MEDICINE

## 2022-02-16 PROCEDURE — 3288F FALL RISK ASSESSMENT DOCD: CPT | Mod: HCNC,CPTII,S$GLB, | Performed by: INTERNAL MEDICINE

## 2022-02-16 PROCEDURE — 1159F MED LIST DOCD IN RCRD: CPT | Mod: HCNC,CPTII,S$GLB, | Performed by: INTERNAL MEDICINE

## 2022-02-16 PROCEDURE — 1100F PTFALLS ASSESS-DOCD GE2>/YR: CPT | Mod: HCNC,CPTII,S$GLB, | Performed by: INTERNAL MEDICINE

## 2022-02-16 PROCEDURE — 99214 PR OFFICE/OUTPT VISIT, EST, LEVL IV, 30-39 MIN: ICD-10-PCS | Mod: HCNC,S$GLB,, | Performed by: INTERNAL MEDICINE

## 2022-02-16 PROCEDURE — 3074F PR MOST RECENT SYSTOLIC BLOOD PRESSURE < 130 MM HG: ICD-10-PCS | Mod: HCNC,CPTII,S$GLB, | Performed by: INTERNAL MEDICINE

## 2022-02-16 PROCEDURE — 1159F PR MEDICATION LIST DOCUMENTED IN MEDICAL RECORD: ICD-10-PCS | Mod: HCNC,CPTII,S$GLB, | Performed by: INTERNAL MEDICINE

## 2022-02-16 PROCEDURE — 1160F RVW MEDS BY RX/DR IN RCRD: CPT | Mod: HCNC,CPTII,S$GLB, | Performed by: INTERNAL MEDICINE

## 2022-02-16 PROCEDURE — 99499 UNLISTED E&M SERVICE: CPT | Mod: S$GLB,,, | Performed by: INTERNAL MEDICINE

## 2022-02-16 PROCEDURE — 99999 PR PBB SHADOW E&M-EST. PATIENT-LVL V: ICD-10-PCS | Mod: PBBFAC,HCNC,, | Performed by: INTERNAL MEDICINE

## 2022-02-16 PROCEDURE — 99214 OFFICE O/P EST MOD 30 MIN: CPT | Mod: HCNC,S$GLB,, | Performed by: INTERNAL MEDICINE

## 2022-02-16 PROCEDURE — 3074F SYST BP LT 130 MM HG: CPT | Mod: HCNC,CPTII,S$GLB, | Performed by: INTERNAL MEDICINE

## 2022-02-16 PROCEDURE — 3008F BODY MASS INDEX DOCD: CPT | Mod: HCNC,CPTII,S$GLB, | Performed by: INTERNAL MEDICINE

## 2022-02-16 PROCEDURE — 1160F PR REVIEW ALL MEDS BY PRESCRIBER/CLIN PHARMACIST DOCUMENTED: ICD-10-PCS | Mod: HCNC,CPTII,S$GLB, | Performed by: INTERNAL MEDICINE

## 2022-02-16 PROCEDURE — 99499 RISK ADDL DX/OHS AUDIT: ICD-10-PCS | Mod: S$GLB,,, | Performed by: INTERNAL MEDICINE

## 2022-02-16 PROCEDURE — 1126F AMNT PAIN NOTED NONE PRSNT: CPT | Mod: HCNC,CPTII,S$GLB, | Performed by: INTERNAL MEDICINE

## 2022-02-16 PROCEDURE — 3288F PR FALLS RISK ASSESSMENT DOCUMENTED: ICD-10-PCS | Mod: HCNC,CPTII,S$GLB, | Performed by: INTERNAL MEDICINE

## 2022-02-16 PROCEDURE — 3079F PR MOST RECENT DIASTOLIC BLOOD PRESSURE 80-89 MM HG: ICD-10-PCS | Mod: HCNC,CPTII,S$GLB, | Performed by: INTERNAL MEDICINE

## 2022-02-16 PROCEDURE — 3008F PR BODY MASS INDEX (BMI) DOCUMENTED: ICD-10-PCS | Mod: HCNC,CPTII,S$GLB, | Performed by: INTERNAL MEDICINE

## 2022-02-16 RX ORDER — AMLODIPINE BESYLATE 5 MG/1
5 TABLET ORAL DAILY
COMMUNITY
Start: 2021-12-20 | End: 2022-12-06

## 2022-02-16 RX ORDER — DROXIDOPA 100 MG/1
1 CAPSULE ORAL 2 TIMES DAILY
COMMUNITY
Start: 2022-02-07 | End: 2023-03-02

## 2022-02-16 RX ORDER — ALPRAZOLAM 0.5 MG/1
0.5 TABLET ORAL NIGHTLY PRN
Status: ON HOLD | COMMUNITY
Start: 2021-12-28 | End: 2023-02-16 | Stop reason: HOSPADM

## 2022-02-16 NOTE — PROGRESS NOTES
Subjective:       Patient ID: Jarrett Charlton Jr. is a 71 y.o. male.    Chief Complaint: No chief complaint on file.    HPI  Pt visit today for routine f/u he has been doing quite well no ER visit x 1yr ileostomy no liquid stool no blood no pain no sob cp no fever chill no n/v/d no abd pain . Only physical c/o otoday is chronic left neck pain pt feels like a lump in the neck he was seen by ENT had scope negative no fever chill no night sweat wt loss  Review of Systems    Objective:      Physical Exam  Vitals and nursing note reviewed.   Constitutional:       General: He is not in acute distress.     Appearance: He is well-developed and well-nourished.   HENT:      Head: Normocephalic and atraumatic.      Right Ear: External ear normal.      Left Ear: External ear normal.      Nose: Nose normal.      Mouth/Throat:      Mouth: Oropharynx is clear and moist.      Pharynx: No oropharyngeal exudate.   Eyes:      Extraocular Movements: Extraocular movements intact and EOM normal.      Conjunctiva/sclera: Conjunctivae normal.      Pupils: Pupils are equal, round, and reactive to light.   Neck:      Thyroid: No thyromegaly.   Cardiovascular:      Rate and Rhythm: Normal rate and regular rhythm.      Pulses: Intact distal pulses.           Dorsalis pedis pulses are 2+ on the right side and 2+ on the left side.        Posterior tibial pulses are 2+ on the right side and 2+ on the left side.      Heart sounds: Normal heart sounds. No murmur heard.  No friction rub. No gallop.    Pulmonary:      Effort: Pulmonary effort is normal. No respiratory distress.      Breath sounds: Normal breath sounds. No wheezing or rales.   Abdominal:      General: Bowel sounds are normal. There is no distension.      Palpations: Abdomen is soft.      Tenderness: There is no abdominal tenderness. There is no guarding.   Musculoskeletal:         General: No tenderness, deformity or edema. Normal range of motion.      Cervical back: Normal range of  motion and neck supple.   Feet:      Right foot:      Protective Sensation: 5 sites tested. 5 sites sensed.      Skin integrity: Skin integrity normal.      Toenail Condition: Right toenails are normal.      Left foot:      Protective Sensation: 5 sites tested. 5 sites sensed.      Skin integrity: Skin integrity normal.      Toenail Condition: Left toenails are normal.   Lymphadenopathy:      Cervical: No cervical adenopathy.   Skin:     General: Skin is warm and dry.      Findings: No erythema or rash.   Neurological:      General: No focal deficit present.      Mental Status: He is alert and oriented to person, place, and time.   Psychiatric:         Mood and Affect: Mood and affect and mood normal.         Thought Content: Thought content normal.         Judgment: Judgment normal.         Assessment:       1. Neck pain on left side    2. Type 2 diabetes mellitus with stage 3b chronic kidney disease, without long-term current use of insulin    3. Chronic diarrhea    4. Benign prostatic hyperplasia without lower urinary tract symptoms    5. High output ileostomy    6. Essential hypertension    7. Screening for prostate cancer    8. Anxiety    9. Encounter for diabetic foot exam        Plan:       Neck pain on left side  -     CT Soft Tissue Neck WO Contrast; Future; Expected date: 02/16/2022    Type 2 diabetes mellitus with stage 3b chronic kidney disease, without long-term current use of insulin  -     Lipid Panel; Future; Expected date: 02/16/2022  -     Hemoglobin A1C; Future; Expected date: 02/16/2022  -     Microalbumin/Creatinine Ratio, Urine; Future; Expected date: 02/16/2022    Chronic diarrhea    Benign prostatic hyperplasia without lower urinary tract symptoms  -     TSH; Future; Expected date: 02/16/2022    High output ileostomy    Essential hypertension  -     CBC Auto Differential; Future; Expected date: 02/16/2022  -     Comprehensive Metabolic Panel; Future; Expected date: 02/16/2022    Screening for  prostate cancer  -     PSA, Screening; Future; Expected date: 02/16/2022    Anxiety  -     TSH; Future; Expected date: 02/16/2022    Encounter for diabetic foot exam  Comments:  normal feet exam        Medication List with Changes/Refills   Current Medications    ACCU-CHEK PAUL CONTROL SOLN SOLN    1 drop by NOT APPLICABLE route once daily.    ACCU-CHEK SOFTCLIX LANCETS MISC    1 lancet by NOT APPLICABLE route once daily.    ALLOPURINOL (ZYLOPRIM) 300 MG TABLET    Take 1.5 tablets (450 mg total) by mouth once daily.    ALPRAZOLAM (XANAX) 0.5 MG TABLET    Take 0.5 mg by mouth nightly as needed.    AMIODARONE (PACERONE) 200 MG TAB    Take 200 mg by mouth 3 (three) times daily.    AMLODIPINE (NORVASC) 5 MG TABLET        ASPIRIN (ECOTRIN) 81 MG EC TABLET    Take 81 mg by mouth once daily.    BD ALCOHOL SWABS PADM        BLOOD SUGAR DIAGNOSTIC (ONE TOUCH ULTRA TEST) STRP    USE ONE STRIP TO CHECK GLUCOSE EVERY DAY    BRIMONIDINE 0.2% (ALPHAGAN) 0.2 % DROP    INSTILL 1 DROP INTO EACH EYE TWICE DAILY    CALCIUM CITRATE-VITAMIN D3 (CITRACAL + D MAXIMUM) 315 MG-6.25 MCG (250 UNIT) TAB    Take 1 tablet by mouth once daily.    CARVEDILOL (COREG) 12.5 MG TABLET    Take 6.25 mg by mouth 2 (two) times daily with meals.     CLOPIDOGREL (PLAVIX) 75 MG TABLET    once daily.     DIPHENOXYLATE-ATROPINE 2.5-0.025 MG (LOMOTIL) 2.5-0.025 MG PER TABLET    Take 1 tablet by mouth 4 (four) times daily.    FIBER, CALCIUM POLYCARBOPHIL, 625 MG TABLET    Take 1 tablet by mouth once daily.    GABAPENTIN (NEURONTIN) 600 MG TABLET    Take 1 tablet (600 mg total) by mouth 2 (two) times daily.    GLIMEPIRIDE (AMARYL) 4 MG TABLET    Take 4 mg by mouth 2 (two) times a day.    MAGNESIUM OXIDE (MAG-OX) 400 MG (241.3 MG MAGNESIUM) TABLET    Take 400 mg by mouth once daily.    MIDODRINE (PROAMATINE) 2.5 MG TAB        NORTHERA 300 MG CAP    Take 1 capsule by mouth 3 (three) times daily.    PANTOPRAZOLE (PROTONIX) 40 MG TABLET    Take 40 mg by mouth once  daily.    PRAVASTATIN (PRAVACHOL) 40 MG TABLET    Take 1 tablet (40 mg total) by mouth once daily.   Discontinued Medications    AMLODIPINE (NORVASC) 2.5 MG TABLET    Take 1 tablet by mouth daily as needed.    NORTHERA 100 MG CAP    Take 1 capsule by mouth 3 (three) times daily.

## 2022-02-22 NOTE — Clinical Note
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 Think this would be better treated with Efudex to his entire ear once his Mohs site is healed - say in 2 months.  Told him he could f/u with you for that.  Thanks! Cheilitis Normal Treatment: - Recommended application of Vaseline ointment or Aquaphor numerous times a day (as often as every hour) and before going to bed.

## 2022-03-07 ENCOUNTER — TELEPHONE (OUTPATIENT)
Dept: DERMATOLOGY | Facility: CLINIC | Age: 72
End: 2022-03-07
Payer: MEDICARE

## 2022-03-07 ENCOUNTER — OFFICE VISIT (OUTPATIENT)
Dept: PRIMARY CARE CLINIC | Facility: CLINIC | Age: 72
End: 2022-03-07
Payer: MEDICARE

## 2022-03-07 VITALS
RESPIRATION RATE: 18 BRPM | OXYGEN SATURATION: 97 % | HEIGHT: 74 IN | BODY MASS INDEX: 28.15 KG/M2 | WEIGHT: 219.38 LBS | TEMPERATURE: 98 F | SYSTOLIC BLOOD PRESSURE: 102 MMHG | DIASTOLIC BLOOD PRESSURE: 58 MMHG | HEART RATE: 63 BPM

## 2022-03-07 DIAGNOSIS — J40 BRONCHITIS: Primary | ICD-10-CM

## 2022-03-07 DIAGNOSIS — J32.9 SINUSITIS, UNSPECIFIED CHRONICITY, UNSPECIFIED LOCATION: ICD-10-CM

## 2022-03-07 DIAGNOSIS — L01.00 IMPETIGO: ICD-10-CM

## 2022-03-07 DIAGNOSIS — E11.22 TYPE 2 DIABETES MELLITUS WITH STAGE 3B CHRONIC KIDNEY DISEASE, WITHOUT LONG-TERM CURRENT USE OF INSULIN: ICD-10-CM

## 2022-03-07 DIAGNOSIS — R05.9 COUGHING: ICD-10-CM

## 2022-03-07 DIAGNOSIS — N18.32 TYPE 2 DIABETES MELLITUS WITH STAGE 3B CHRONIC KIDNEY DISEASE, WITHOUT LONG-TERM CURRENT USE OF INSULIN: ICD-10-CM

## 2022-03-07 PROCEDURE — 99999 PR PBB SHADOW E&M-EST. PATIENT-LVL V: ICD-10-PCS | Mod: PBBFAC,,, | Performed by: INTERNAL MEDICINE

## 2022-03-07 PROCEDURE — 3078F DIAST BP <80 MM HG: CPT | Mod: CPTII,S$GLB,, | Performed by: INTERNAL MEDICINE

## 2022-03-07 PROCEDURE — 3008F BODY MASS INDEX DOCD: CPT | Mod: CPTII,S$GLB,, | Performed by: INTERNAL MEDICINE

## 2022-03-07 PROCEDURE — 3078F PR MOST RECENT DIASTOLIC BLOOD PRESSURE < 80 MM HG: ICD-10-PCS | Mod: CPTII,S$GLB,, | Performed by: INTERNAL MEDICINE

## 2022-03-07 PROCEDURE — 3288F FALL RISK ASSESSMENT DOCD: CPT | Mod: CPTII,S$GLB,, | Performed by: INTERNAL MEDICINE

## 2022-03-07 PROCEDURE — 1159F PR MEDICATION LIST DOCUMENTED IN MEDICAL RECORD: ICD-10-PCS | Mod: CPTII,S$GLB,, | Performed by: INTERNAL MEDICINE

## 2022-03-07 PROCEDURE — 3051F HG A1C>EQUAL 7.0%<8.0%: CPT | Mod: CPTII,S$GLB,, | Performed by: INTERNAL MEDICINE

## 2022-03-07 PROCEDURE — 1125F AMNT PAIN NOTED PAIN PRSNT: CPT | Mod: CPTII,S$GLB,, | Performed by: INTERNAL MEDICINE

## 2022-03-07 PROCEDURE — 3008F PR BODY MASS INDEX (BMI) DOCUMENTED: ICD-10-PCS | Mod: CPTII,S$GLB,, | Performed by: INTERNAL MEDICINE

## 2022-03-07 PROCEDURE — 1160F RVW MEDS BY RX/DR IN RCRD: CPT | Mod: CPTII,S$GLB,, | Performed by: INTERNAL MEDICINE

## 2022-03-07 PROCEDURE — 3288F PR FALLS RISK ASSESSMENT DOCUMENTED: ICD-10-PCS | Mod: CPTII,S$GLB,, | Performed by: INTERNAL MEDICINE

## 2022-03-07 PROCEDURE — 3074F PR MOST RECENT SYSTOLIC BLOOD PRESSURE < 130 MM HG: ICD-10-PCS | Mod: CPTII,S$GLB,, | Performed by: INTERNAL MEDICINE

## 2022-03-07 PROCEDURE — 1101F PT FALLS ASSESS-DOCD LE1/YR: CPT | Mod: CPTII,S$GLB,, | Performed by: INTERNAL MEDICINE

## 2022-03-07 PROCEDURE — 1101F PR PT FALLS ASSESS DOC 0-1 FALLS W/OUT INJ PAST YR: ICD-10-PCS | Mod: CPTII,S$GLB,, | Performed by: INTERNAL MEDICINE

## 2022-03-07 PROCEDURE — 1159F MED LIST DOCD IN RCRD: CPT | Mod: CPTII,S$GLB,, | Performed by: INTERNAL MEDICINE

## 2022-03-07 PROCEDURE — 1160F PR REVIEW ALL MEDS BY PRESCRIBER/CLIN PHARMACIST DOCUMENTED: ICD-10-PCS | Mod: CPTII,S$GLB,, | Performed by: INTERNAL MEDICINE

## 2022-03-07 PROCEDURE — 99214 PR OFFICE/OUTPT VISIT, EST, LEVL IV, 30-39 MIN: ICD-10-PCS | Mod: 25,S$GLB,, | Performed by: INTERNAL MEDICINE

## 2022-03-07 PROCEDURE — 99214 OFFICE O/P EST MOD 30 MIN: CPT | Mod: 25,S$GLB,, | Performed by: INTERNAL MEDICINE

## 2022-03-07 PROCEDURE — 3074F SYST BP LT 130 MM HG: CPT | Mod: CPTII,S$GLB,, | Performed by: INTERNAL MEDICINE

## 2022-03-07 PROCEDURE — 96372 THER/PROPH/DIAG INJ SC/IM: CPT | Mod: S$GLB,,, | Performed by: INTERNAL MEDICINE

## 2022-03-07 PROCEDURE — 99999 PR PBB SHADOW E&M-EST. PATIENT-LVL V: CPT | Mod: PBBFAC,,, | Performed by: INTERNAL MEDICINE

## 2022-03-07 PROCEDURE — 96372 PR INJECTION,THERAP/PROPH/DIAG2ST, IM OR SUBCUT: ICD-10-PCS | Mod: S$GLB,,, | Performed by: INTERNAL MEDICINE

## 2022-03-07 PROCEDURE — 1125F PR PAIN SEVERITY QUANTIFIED, PAIN PRESENT: ICD-10-PCS | Mod: CPTII,S$GLB,, | Performed by: INTERNAL MEDICINE

## 2022-03-07 PROCEDURE — 3051F PR MOST RECENT HEMOGLOBIN A1C LEVEL 7.0 - < 8.0%: ICD-10-PCS | Mod: CPTII,S$GLB,, | Performed by: INTERNAL MEDICINE

## 2022-03-07 RX ORDER — FLUDROCORTISONE ACETATE 0.1 MG/1
TABLET ORAL
COMMUNITY
Start: 2021-12-09 | End: 2022-09-27

## 2022-03-07 RX ORDER — CODEINE PHOSPHATE AND GUAIFENESIN 10; 100 MG/5ML; MG/5ML
5 SOLUTION ORAL EVERY 6 HOURS PRN
Qty: 150 ML | Refills: 0 | Status: SHIPPED | OUTPATIENT
Start: 2022-03-07 | End: 2022-04-04 | Stop reason: ALTCHOICE

## 2022-03-07 RX ORDER — LEVOFLOXACIN 500 MG/1
500 TABLET, FILM COATED ORAL DAILY
Qty: 10 TABLET | Refills: 0 | Status: SHIPPED | OUTPATIENT
Start: 2022-03-07 | End: 2022-03-17

## 2022-03-07 RX ORDER — TRIAMCINOLONE ACETONIDE 40 MG/ML
40 INJECTION, SUSPENSION INTRA-ARTICULAR; INTRAMUSCULAR
Status: COMPLETED | OUTPATIENT
Start: 2022-03-07 | End: 2022-03-07

## 2022-03-07 RX ORDER — ELUXADOLINE 100 MG/1
TABLET, FILM COATED ORAL
COMMUNITY
Start: 2021-12-01 | End: 2022-03-15

## 2022-03-07 RX ORDER — MUPIROCIN 20 MG/G
OINTMENT TOPICAL 2 TIMES DAILY
Qty: 22 G | Refills: 0 | Status: SHIPPED | OUTPATIENT
Start: 2022-03-07 | End: 2022-04-04

## 2022-03-07 RX ADMIN — TRIAMCINOLONE ACETONIDE 40 MG: 40 INJECTION, SUSPENSION INTRA-ARTICULAR; INTRAMUSCULAR at 10:03

## 2022-03-07 NOTE — PROGRESS NOTES
Verified pt ID using name and . Allergies verified with pt. Administered Rocephin 1 g IM in Right VG, and Kenalog 40 mg IM in Left VG per physician order using aseptic technique. Aspirated and no blood return noted. Pt tolerated well with no adverse reactions noted.

## 2022-03-07 NOTE — PROGRESS NOTES
Subjective:       Patient ID: Jarrett Charlton Jr. is a 71 y.o. male.    Chief Complaint: Cough, Nasal Congestion, and Headache    HPI  Pt with c/o coughing congestion  Sob since Thursday last week nonproductive until yesterday with white phlegm no cp no n/v/d but coughing a lot had covid vaccine x3 . His DM fairly controlled . Pt also c/o skin rash rt foearm x 1 week with dried yellow crusty exudate and shocking electric pain left foot on and off   Review of Systems    Objective:      Physical Exam  Vitals and nursing note reviewed.   Constitutional:       General: He is not in acute distress.     Appearance: He is well-developed.   HENT:      Head: Normocephalic and atraumatic.      Right Ear: External ear normal.      Left Ear: External ear normal.      Nose: Congestion present.   Eyes:      Extraocular Movements: Extraocular movements intact.      Conjunctiva/sclera: Conjunctivae normal.      Pupils: Pupils are equal, round, and reactive to light.   Neck:      Thyroid: No thyromegaly.   Cardiovascular:      Rate and Rhythm: Normal rate and regular rhythm.      Heart sounds: Normal heart sounds. No murmur heard.    No friction rub. No gallop.   Pulmonary:      Effort: Pulmonary effort is normal. No respiratory distress.      Breath sounds: Normal breath sounds. No wheezing or rales.   Abdominal:      General: Bowel sounds are normal. There is no distension.      Palpations: Abdomen is soft.      Tenderness: There is no abdominal tenderness. There is no guarding.   Musculoskeletal:         General: No tenderness or deformity. Normal range of motion.      Cervical back: Normal range of motion and neck supple.   Lymphadenopathy:      Cervical: No cervical adenopathy.   Skin:     General: Skin is warm and dry.      Findings: No erythema or rash.      Comments: Skin rash with yellow dried exudate   Neurological:      General: No focal deficit present.      Mental Status: He is alert and oriented to person, place, and  time.   Psychiatric:         Thought Content: Thought content normal.         Judgment: Judgment normal.         Assessment:       1. Bronchitis    2. Sinusitis, unspecified chronicity, unspecified location    3. Impetigo    4. Type 2 diabetes mellitus with stage 3b chronic kidney disease, without long-term current use of insulin    5. Coughing        Plan:       Bronchitis  Comments:  CXR if not better  Orders:  -     cefTRIAXone (ROCEPHIN) 1 g in LIDOcaine HCL 10 mg/ml (1%) IM only syringe  -     triamcinolone acetonide injection 40 mg  -     levoFLOXacin (LEVAQUIN) 500 MG tablet; Take 1 tablet (500 mg total) by mouth once daily. for 10 days  Dispense: 10 tablet; Refill: 0    Sinusitis, unspecified chronicity, unspecified location  -     cefTRIAXone (ROCEPHIN) 1 g in LIDOcaine HCL 10 mg/ml (1%) IM only syringe  -     levoFLOXacin (LEVAQUIN) 500 MG tablet; Take 1 tablet (500 mg total) by mouth once daily. for 10 days  Dispense: 10 tablet; Refill: 0    Impetigo  -     mupirocin (BACTROBAN) 2 % ointment; Apply topically 2 (two) times daily.  Dispense: 22 g; Refill: 0    Type 2 diabetes mellitus with stage 3b chronic kidney disease, without long-term current use of insulin  Comments:  DM well controlled Hgba1c 7.0 continue with tx    Coughing  -     guaiFENesin-codeine 100-10 mg/5 ml (TUSSI-ORGANIDIN NR)  mg/5 mL syrup; Take 5 mLs by mouth every 6 (six) hours as needed for Cough.  Dispense: 150 mL; Refill: 0        Medication List with Changes/Refills   New Medications    GUAIFENESIN-CODEINE 100-10 MG/5 ML (TUSSI-ORGANIDIN NR)  MG/5 ML SYRUP    Take 5 mLs by mouth every 6 (six) hours as needed for Cough.    LEVOFLOXACIN (LEVAQUIN) 500 MG TABLET    Take 1 tablet (500 mg total) by mouth once daily. for 10 days    MUPIROCIN (BACTROBAN) 2 % OINTMENT    Apply topically 2 (two) times daily.   Current Medications    ACCU-CHEK PAUL CONTROL SOLN SOLN    1 drop by NOT APPLICABLE route once daily.    ACCU-CHEK  SOFTCLIX LANCETS MISC    1 lancet by NOT APPLICABLE route once daily.    ALLOPURINOL (ZYLOPRIM) 300 MG TABLET    Take 1.5 tablets (450 mg total) by mouth once daily.    ALPRAZOLAM (XANAX) 0.5 MG TABLET    Take 0.5 mg by mouth nightly as needed.    AMIODARONE (PACERONE) 200 MG TAB    Take 200 mg by mouth 3 (three) times daily.    AMLODIPINE (NORVASC) 5 MG TABLET        ASPIRIN (ECOTRIN) 81 MG EC TABLET    Take 81 mg by mouth once daily.    BD ALCOHOL SWABS PADM        BLOOD SUGAR DIAGNOSTIC (ONE TOUCH ULTRA TEST) [facility]    USE ONE STRIP TO CHECK GLUCOSE EVERY DAY    BRIMONIDINE 0.2% (ALPHAGAN) 0.2 % DROP    INSTILL 1 DROP INTO EACH EYE TWICE DAILY    CALCIUM CITRATE-VITAMIN D3 (CITRACAL + D MAXIMUM) 315 MG-6.25 MCG (250 UNIT) TAB    Take 1 tablet by mouth once daily.    CARVEDILOL (COREG) 12.5 MG TABLET    Take 6.25 mg by mouth 2 (two) times daily with meals.     CLOPIDOGREL (PLAVIX) 75 MG TABLET    once daily.     DIPHENOXYLATE-ATROPINE 2.5-0.025 MG (LOMOTIL) 2.5-0.025 MG PER TABLET    Take 1 tablet by mouth 4 (four) times daily.    FIBER, CALCIUM POLYCARBOPHIL, 625 MG TABLET    Take 1 tablet by mouth once daily.    FLUDROCORTISONE (FLORINEF) 0.1 MG TAB        GABAPENTIN (NEURONTIN) 600 MG TABLET    Take 1 tablet (600 mg total) by mouth 2 (two) times daily.    GLIMEPIRIDE (AMARYL) 4 MG TABLET    Take 4 mg by mouth 2 (two) times a day.    MAGNESIUM OXIDE (MAG-OX) 400 MG (241.3 MG MAGNESIUM) TABLET    Take 400 mg by mouth once daily.    MIDODRINE (PROAMATINE) 2.5 MG TAB        NORTHERA 300 MG CAP    Take 1 capsule by mouth 3 (three) times daily.    PANTOPRAZOLE (PROTONIX) 40 MG TABLET    Take 40 mg by mouth once daily.    PRAVASTATIN (PRAVACHOL) 40 MG TABLET    Take 1 tablet (40 mg total) by mouth once daily.    VIBERZI 100 MG TAB

## 2022-03-08 ENCOUNTER — TELEPHONE (OUTPATIENT)
Dept: NEPHROLOGY | Facility: CLINIC | Age: 72
End: 2022-03-08
Payer: MEDICARE

## 2022-03-08 ENCOUNTER — PATIENT OUTREACH (OUTPATIENT)
Dept: ADMINISTRATIVE | Facility: OTHER | Age: 72
End: 2022-03-08
Payer: MEDICARE

## 2022-03-08 NOTE — TELEPHONE ENCOUNTER
----- Message from Kelly Jean sent at 3/8/2022  1:13 PM CST -----  Jarrett Johnson calling regarding stated he is not feeling well.  He will cancel his appt 03/09/22.  he want to know if you can call him to give test results.  call back [phone]

## 2022-03-08 NOTE — PROGRESS NOTES
Health Maintenance Due   Topic Date Due    Eye Exam  09/24/2021     Updates were requested from care everywhere.  Chart was reviewed for overdue Proactive [facility] Encounters ([facility]) topics (CRS, Breast Cancer Screening, Eye exam)  Health Maintenance has been updated.  LINKS immunization registry triggered.  Immunizations were reconciled.

## 2022-03-09 PROBLEM — N18.9 CKD (CHRONIC KIDNEY DISEASE): Status: ACTIVE | Noted: 2021-02-11

## 2022-03-09 PROBLEM — Z93.3 COLOSTOMY PRESENT: Status: ACTIVE | Noted: 2022-03-09

## 2022-03-09 PROBLEM — E87.6 HYPOKALEMIA: Status: ACTIVE | Noted: 2022-03-09

## 2022-03-14 ENCOUNTER — TELEPHONE (OUTPATIENT)
Dept: PRIMARY CARE CLINIC | Facility: CLINIC | Age: 72
End: 2022-03-14
Payer: MEDICARE

## 2022-03-14 NOTE — TELEPHONE ENCOUNTER
Spoke with pt. Coming in derrick morning for 10:45 am pt. Was in the hospital for 4 days with a bowel obstruction but coughing a lot and severe chest congestion. Wanting to be see soon as possible.

## 2022-03-14 NOTE — TELEPHONE ENCOUNTER
----- Message from Divine Mendez sent at 3/14/2022 12:08 PM CDT -----  Contact: Self   Caller is requesting an earlier appointment then we can schedule.  Caller is requesting a message be sent to the provider.  If this is for urgent care symptoms, did you offer other providers at this location, providers at other locations, or [facility]? (yes, no, n/a):  n/a  If this is for the patients physical, did you offer to schedule next available and put on wait list, or to see NP or PA for their physical?  (yes, no, n/a):  yes  When is the next available appointment with their provider:  3/18  Reason for the appointment:  hospital f/u  Patient preference of timeframe to be scheduled:  asap  Would the patient like a call back, or a response through their [facility] portal?:   call back  Comments:

## 2022-03-15 ENCOUNTER — OFFICE VISIT (OUTPATIENT)
Dept: PRIMARY CARE CLINIC | Facility: CLINIC | Age: 72
End: 2022-03-15
Payer: MEDICARE

## 2022-03-15 VITALS
OXYGEN SATURATION: 98 % | HEART RATE: 66 BPM | RESPIRATION RATE: 16 BRPM | WEIGHT: 215.06 LBS | HEIGHT: 74 IN | DIASTOLIC BLOOD PRESSURE: 72 MMHG | BODY MASS INDEX: 27.6 KG/M2 | SYSTOLIC BLOOD PRESSURE: 118 MMHG

## 2022-03-15 DIAGNOSIS — C61 PROSTATE CANCER: ICD-10-CM

## 2022-03-15 DIAGNOSIS — R10.12 LEFT UPPER QUADRANT ABDOMINAL PAIN: ICD-10-CM

## 2022-03-15 DIAGNOSIS — K56.609 SBO (SMALL BOWEL OBSTRUCTION): Primary | ICD-10-CM

## 2022-03-15 DIAGNOSIS — I10 ESSENTIAL HYPERTENSION: ICD-10-CM

## 2022-03-15 PROCEDURE — 99999 PR PBB SHADOW E&M-EST. PATIENT-LVL V: ICD-10-PCS | Mod: PBBFAC,,, | Performed by: INTERNAL MEDICINE

## 2022-03-15 PROCEDURE — 99213 PR OFFICE/OUTPT VISIT, EST, LEVL III, 20-29 MIN: ICD-10-PCS | Mod: S$GLB,,, | Performed by: INTERNAL MEDICINE

## 2022-03-15 PROCEDURE — 99499 RISK ADDL DX/OHS AUDIT: ICD-10-PCS | Mod: HCNC,S$GLB,, | Performed by: INTERNAL MEDICINE

## 2022-03-15 PROCEDURE — 1101F PR PT FALLS ASSESS DOC 0-1 FALLS W/OUT INJ PAST YR: ICD-10-PCS | Mod: CPTII,S$GLB,, | Performed by: INTERNAL MEDICINE

## 2022-03-15 PROCEDURE — 3074F SYST BP LT 130 MM HG: CPT | Mod: CPTII,S$GLB,, | Performed by: INTERNAL MEDICINE

## 2022-03-15 PROCEDURE — 1160F RVW MEDS BY RX/DR IN RCRD: CPT | Mod: CPTII,S$GLB,, | Performed by: INTERNAL MEDICINE

## 2022-03-15 PROCEDURE — 3078F DIAST BP <80 MM HG: CPT | Mod: CPTII,S$GLB,, | Performed by: INTERNAL MEDICINE

## 2022-03-15 PROCEDURE — 3078F PR MOST RECENT DIASTOLIC BLOOD PRESSURE < 80 MM HG: ICD-10-PCS | Mod: CPTII,S$GLB,, | Performed by: INTERNAL MEDICINE

## 2022-03-15 PROCEDURE — 99213 OFFICE O/P EST LOW 20 MIN: CPT | Mod: S$GLB,,, | Performed by: INTERNAL MEDICINE

## 2022-03-15 PROCEDURE — 3051F PR MOST RECENT HEMOGLOBIN A1C LEVEL 7.0 - < 8.0%: ICD-10-PCS | Mod: CPTII,S$GLB,, | Performed by: INTERNAL MEDICINE

## 2022-03-15 PROCEDURE — 3051F HG A1C>EQUAL 7.0%<8.0%: CPT | Mod: CPTII,S$GLB,, | Performed by: INTERNAL MEDICINE

## 2022-03-15 PROCEDURE — 3008F PR BODY MASS INDEX (BMI) DOCUMENTED: ICD-10-PCS | Mod: CPTII,S$GLB,, | Performed by: INTERNAL MEDICINE

## 2022-03-15 PROCEDURE — 3074F PR MOST RECENT SYSTOLIC BLOOD PRESSURE < 130 MM HG: ICD-10-PCS | Mod: CPTII,S$GLB,, | Performed by: INTERNAL MEDICINE

## 2022-03-15 PROCEDURE — 1159F PR MEDICATION LIST DOCUMENTED IN MEDICAL RECORD: ICD-10-PCS | Mod: CPTII,S$GLB,, | Performed by: INTERNAL MEDICINE

## 2022-03-15 PROCEDURE — 1126F PR PAIN SEVERITY QUANTIFIED, NO PAIN PRESENT: ICD-10-PCS | Mod: CPTII,S$GLB,, | Performed by: INTERNAL MEDICINE

## 2022-03-15 PROCEDURE — 1126F AMNT PAIN NOTED NONE PRSNT: CPT | Mod: CPTII,S$GLB,, | Performed by: INTERNAL MEDICINE

## 2022-03-15 PROCEDURE — 99999 PR PBB SHADOW E&M-EST. PATIENT-LVL V: CPT | Mod: PBBFAC,,, | Performed by: INTERNAL MEDICINE

## 2022-03-15 PROCEDURE — 99499 UNLISTED E&M SERVICE: CPT | Mod: HCNC,S$GLB,, | Performed by: INTERNAL MEDICINE

## 2022-03-15 PROCEDURE — 1160F PR REVIEW ALL MEDS BY PRESCRIBER/CLIN PHARMACIST DOCUMENTED: ICD-10-PCS | Mod: CPTII,S$GLB,, | Performed by: INTERNAL MEDICINE

## 2022-03-15 PROCEDURE — 1101F PT FALLS ASSESS-DOCD LE1/YR: CPT | Mod: CPTII,S$GLB,, | Performed by: INTERNAL MEDICINE

## 2022-03-15 PROCEDURE — 3008F BODY MASS INDEX DOCD: CPT | Mod: CPTII,S$GLB,, | Performed by: INTERNAL MEDICINE

## 2022-03-15 PROCEDURE — 3288F FALL RISK ASSESSMENT DOCD: CPT | Mod: CPTII,S$GLB,, | Performed by: INTERNAL MEDICINE

## 2022-03-15 PROCEDURE — 3288F PR FALLS RISK ASSESSMENT DOCUMENTED: ICD-10-PCS | Mod: CPTII,S$GLB,, | Performed by: INTERNAL MEDICINE

## 2022-03-15 PROCEDURE — 1159F MED LIST DOCD IN RCRD: CPT | Mod: CPTII,S$GLB,, | Performed by: INTERNAL MEDICINE

## 2022-03-15 RX ORDER — HYDROCODONE BITARTRATE AND ACETAMINOPHEN 5; 325 MG/1; MG/1
1 TABLET ORAL EVERY 12 HOURS PRN
Qty: 20 TABLET | Refills: 0 | Status: SHIPPED | OUTPATIENT
Start: 2022-03-15 | End: 2022-03-18 | Stop reason: SDUPTHER

## 2022-03-15 NOTE — PROGRESS NOTES
Subjective:       Patient ID: Jarrett Charlton Jr. is a 71 y.o. male.    Chief Complaint: Hospital Follow Up, Sinusitis, and Dizziness    HPI  Pt visit today for f/u he was in hospital x 4 days for SBO resolved with NG tube suction this has been recurrent problems he still having pain in the abd mostly on the left side colostomy is functioning well no liquid stool but had diarrhea yesterday pt with intermittent  recurrent dehydration from colostomy high output no fever chill sob cp. Pt with frequent trip to ER due to abd pain  Review of Systems    Objective:      Physical Exam  Vitals and nursing note reviewed.   Constitutional:       General: He is not in acute distress.     Appearance: He is well-developed.   HENT:      Head: Normocephalic and atraumatic.      Right Ear: External ear normal.      Left Ear: External ear normal.      Nose: Nose normal.      Mouth/Throat:      Pharynx: No oropharyngeal exudate.   Eyes:      Extraocular Movements: Extraocular movements intact.      Conjunctiva/sclera: Conjunctivae normal.      Pupils: Pupils are equal, round, and reactive to light.   Neck:      Thyroid: No thyromegaly.   Cardiovascular:      Rate and Rhythm: Normal rate and regular rhythm.      Heart sounds: Normal heart sounds. No murmur heard.    No friction rub. No gallop.   Pulmonary:      Effort: Pulmonary effort is normal. No respiratory distress.      Breath sounds: Normal breath sounds. No wheezing or rales.   Chest:      Chest wall: No tenderness.   Abdominal:      General: Bowel sounds are normal. There is no distension.      Palpations: Abdomen is soft.      Tenderness: There is abdominal tenderness (subjective tenderness LUQ). There is no guarding.   Musculoskeletal:         General: No tenderness or deformity. Normal range of motion.      Cervical back: Normal range of motion and neck supple.   Lymphadenopathy:      Cervical: No cervical adenopathy.   Skin:     General: Skin is warm and dry.       Findings: No erythema or rash.   Neurological:      General: No focal deficit present.      Mental Status: He is alert and oriented to person, place, and time.   Psychiatric:         Thought Content: Thought content normal.         Judgment: Judgment normal.         Assessment:       1. SBO (small bowel obstruction)    2. Left upper quadrant abdominal pain    3. Essential hypertension    4. Prostate cancer        Plan:       SBO (small bowel obstruction)  -     FL Small Bowel Follow Through; Future; Expected date: 03/15/2022    Left upper quadrant abdominal pain  -     HYDROcodone-acetaminophen (NORCO) 5-325 mg per tablet; Take 1 tablet by mouth every 12 (twelve) hours as needed for Pain.  Dispense: 20 tablet; Refill: 0    Essential hypertension  Comments:  stable no chnage in tx    Prostate cancer  Comments:  inremission PSA <0.03 f/u with urology Dr Mota        Medication List with Changes/Refills   New Medications    HYDROCODONE-ACETAMINOPHEN (NORCO) 5-325 MG PER TABLET    Take 1 tablet by mouth every 12 (twelve) hours as needed for Pain.   Current Medications    ACCU-CHEK PAUL CONTROL SOLN SOLN    1 drop by NOT APPLICABLE route once daily.    ACCU-CHEK SOFTCLIX LANCETS MISC    1 lancet by NOT APPLICABLE route once daily.    ALLOPURINOL (ZYLOPRIM) 300 MG TABLET    Take 1.5 tablets (450 mg total) by mouth once daily.    ALPRAZOLAM (XANAX) 0.5 MG TABLET    Take 0.5 mg by mouth nightly as needed.    AMIODARONE (PACERONE) 200 MG TAB    Take 1 tablet (200 mg total) by mouth 2 (two) times daily.    AMLODIPINE (NORVASC) 5 MG TABLET        ASPIRIN (ECOTRIN) 81 MG EC TABLET    Take 81 mg by mouth once daily.    BD ALCOHOL SWABS PADM        BLOOD SUGAR DIAGNOSTIC (ONE TOUCH ULTRA TEST) STR    USE ONE STRIP TO CHECK GLUCOSE EVERY DAY    BRIMONIDINE 0.2% (ALPHAGAN) 0.2 % DROP    INSTILL 1 DROP INTO EACH EYE TWICE DAILY    CALCIUM CITRATE-VITAMIN D3 (CITRACAL + D MAXIMUM) 315 MG-6.25 MCG (250 UNIT) TAB    Take 1 tablet by  mouth once daily.    CARVEDILOL (COREG) 12.5 MG TABLET    Take 6.25 mg by mouth 2 (two) times daily with meals.     CLOPIDOGREL (PLAVIX) 75 MG TABLET    once daily.     DIPHENOXYLATE-ATROPINE 2.5-0.025 MG (LOMOTIL) 2.5-0.025 MG PER TABLET    Take 1 tablet by mouth 4 (four) times daily.    FIBER, CALCIUM POLYCARBOPHIL, 625 MG TABLET    Take 1 tablet by mouth once daily.    FLUDROCORTISONE (FLORINEF) 0.1 MG TAB        GABAPENTIN (NEURONTIN) 600 MG TABLET    Take 1 tablet (600 mg total) by mouth 2 (two) times daily.    GLIMEPIRIDE (AMARYL) 4 MG TABLET    Take 4 mg by mouth 2 (two) times a day.    GUAIFENESIN-CODEINE 100-10 MG/5 ML (TUSSI-ORGANIDIN NR)  MG/5 ML SYRUP    Take 5 mLs by mouth every 6 (six) hours as needed for Cough.    LEVOFLOXACIN (LEVAQUIN) 500 MG TABLET    Take 1 tablet (500 mg total) by mouth once daily. for 10 days    MAGNESIUM OXIDE (MAG-OX) 400 MG (241.3 MG MAGNESIUM) TABLET    Take 400 mg by mouth once daily.    MUPIROCIN (BACTROBAN) 2 % OINTMENT    Apply topically 2 (two) times daily.    NORTHERA 300 MG CAP    Take 1 capsule by mouth 3 (three) times daily.    PANTOPRAZOLE (PROTONIX) 40 MG TABLET    Take 40 mg by mouth once daily.    PRAVASTATIN (PRAVACHOL) 40 MG TABLET    Take 1 tablet (40 mg total) by mouth once daily.   Discontinued Medications    VIBERZI 100 MG TAB

## 2022-03-16 ENCOUNTER — HOSPITAL ENCOUNTER (OUTPATIENT)
Dept: RADIOLOGY | Facility: HOSPITAL | Age: 72
Discharge: HOME | End: 2022-03-16
Attending: INTERNAL MEDICINE
Payer: MEDICARE

## 2022-03-16 DIAGNOSIS — K56.609 SBO (SMALL BOWEL OBSTRUCTION): ICD-10-CM

## 2022-03-16 PROCEDURE — 25500020 PHARM REV CODE 255: Performed by: INTERNAL MEDICINE

## 2022-03-16 PROCEDURE — 74250 FL SMALL BOWEL FOLLOW THROUGH: ICD-10-PCS | Mod: 26,,, | Performed by: STUDENT IN AN ORGANIZED HEALTH CARE EDUCATION/TRAINING PROGRAM

## 2022-03-16 PROCEDURE — 74250 X-RAY XM SM INT 1CNTRST STD: CPT | Mod: 26,,, | Performed by: STUDENT IN AN ORGANIZED HEALTH CARE EDUCATION/TRAINING PROGRAM

## 2022-03-16 PROCEDURE — 74250 X-RAY XM SM INT 1CNTRST STD: CPT | Mod: TC,FY

## 2022-03-16 RX ADMIN — IOHEXOL 200 ML: 350 INJECTION, SOLUTION INTRAVENOUS at 08:03

## 2022-03-18 DIAGNOSIS — R10.12 LEFT UPPER QUADRANT ABDOMINAL PAIN: ICD-10-CM

## 2022-03-18 RX ORDER — HYDROCODONE BITARTRATE AND ACETAMINOPHEN 5; 325 MG/1; MG/1
1 TABLET ORAL EVERY 12 HOURS PRN
Qty: 20 TABLET | Refills: 0 | Status: SHIPPED | OUTPATIENT
Start: 2022-03-18 | End: 2022-10-21

## 2022-03-18 NOTE — TELEPHONE ENCOUNTER
No new care gaps identified.  Powered by [employer]. Reference number: [id number].   3/18/2022 9:02:15 AM CDT

## 2022-03-22 ENCOUNTER — TELEPHONE (OUTPATIENT)
Dept: PRIMARY CARE CLINIC | Facility: CLINIC | Age: 72
End: 2022-03-22
Payer: MEDICARE

## 2022-03-22 NOTE — TELEPHONE ENCOUNTER
----- Message from Evie Bonner sent at 3/22/2022 12:12 PM CDT -----  Contact: self [phone]  Would like referral for gastro due to acid reflux. Please advise

## 2022-03-22 NOTE — TELEPHONE ENCOUNTER
I spoke with the patient and let him know he's already an established patient with Gastro, so all he has to do is call and schedule. The patient said he wanted to know who is the best doctor there, and I let him know that we don't refer to a specific doctor, but he can see anyone within the gastro group at [facility] or let us know another doctor outside of [facility] he wants to see and we can send in a referral.

## 2022-03-30 ENCOUNTER — OFFICE VISIT (OUTPATIENT)
Dept: PRIMARY CARE CLINIC | Facility: CLINIC | Age: 72
End: 2022-03-30
Payer: MEDICARE

## 2022-03-30 VITALS
HEIGHT: 74 IN | HEART RATE: 73 BPM | RESPIRATION RATE: 16 BRPM | BODY MASS INDEX: 27.93 KG/M2 | OXYGEN SATURATION: 95 % | WEIGHT: 217.63 LBS | DIASTOLIC BLOOD PRESSURE: 62 MMHG | SYSTOLIC BLOOD PRESSURE: 114 MMHG

## 2022-03-30 DIAGNOSIS — E86.0 DEHYDRATION: ICD-10-CM

## 2022-03-30 DIAGNOSIS — E11.22 TYPE 2 DIABETES MELLITUS WITH STAGE 3B CHRONIC KIDNEY DISEASE, WITHOUT LONG-TERM CURRENT USE OF INSULIN: ICD-10-CM

## 2022-03-30 DIAGNOSIS — N18.32 TYPE 2 DIABETES MELLITUS WITH STAGE 3B CHRONIC KIDNEY DISEASE, WITHOUT LONG-TERM CURRENT USE OF INSULIN: ICD-10-CM

## 2022-03-30 DIAGNOSIS — I10 ESSENTIAL HYPERTENSION: Primary | ICD-10-CM

## 2022-03-30 PROCEDURE — 1101F PT FALLS ASSESS-DOCD LE1/YR: CPT | Mod: CPTII,S$GLB,, | Performed by: INTERNAL MEDICINE

## 2022-03-30 PROCEDURE — 3078F PR MOST RECENT DIASTOLIC BLOOD PRESSURE < 80 MM HG: ICD-10-PCS | Mod: CPTII,S$GLB,, | Performed by: INTERNAL MEDICINE

## 2022-03-30 PROCEDURE — 3288F PR FALLS RISK ASSESSMENT DOCUMENTED: ICD-10-PCS | Mod: CPTII,S$GLB,, | Performed by: INTERNAL MEDICINE

## 2022-03-30 PROCEDURE — 3078F DIAST BP <80 MM HG: CPT | Mod: CPTII,S$GLB,, | Performed by: INTERNAL MEDICINE

## 2022-03-30 PROCEDURE — 99213 OFFICE O/P EST LOW 20 MIN: CPT | Mod: S$GLB,,, | Performed by: INTERNAL MEDICINE

## 2022-03-30 PROCEDURE — 99999 PR PBB SHADOW E&M-EST. PATIENT-LVL V: ICD-10-PCS | Mod: PBBFAC,,, | Performed by: INTERNAL MEDICINE

## 2022-03-30 PROCEDURE — 1159F PR MEDICATION LIST DOCUMENTED IN MEDICAL RECORD: ICD-10-PCS | Mod: CPTII,S$GLB,, | Performed by: INTERNAL MEDICINE

## 2022-03-30 PROCEDURE — 3051F PR MOST RECENT HEMOGLOBIN A1C LEVEL 7.0 - < 8.0%: ICD-10-PCS | Mod: CPTII,S$GLB,, | Performed by: INTERNAL MEDICINE

## 2022-03-30 PROCEDURE — 3008F BODY MASS INDEX DOCD: CPT | Mod: CPTII,S$GLB,, | Performed by: INTERNAL MEDICINE

## 2022-03-30 PROCEDURE — 1159F MED LIST DOCD IN RCRD: CPT | Mod: CPTII,S$GLB,, | Performed by: INTERNAL MEDICINE

## 2022-03-30 PROCEDURE — 1126F AMNT PAIN NOTED NONE PRSNT: CPT | Mod: CPTII,S$GLB,, | Performed by: INTERNAL MEDICINE

## 2022-03-30 PROCEDURE — 3074F SYST BP LT 130 MM HG: CPT | Mod: CPTII,S$GLB,, | Performed by: INTERNAL MEDICINE

## 2022-03-30 PROCEDURE — 3008F PR BODY MASS INDEX (BMI) DOCUMENTED: ICD-10-PCS | Mod: CPTII,S$GLB,, | Performed by: INTERNAL MEDICINE

## 2022-03-30 PROCEDURE — 99213 PR OFFICE/OUTPT VISIT, EST, LEVL III, 20-29 MIN: ICD-10-PCS | Mod: S$GLB,,, | Performed by: INTERNAL MEDICINE

## 2022-03-30 PROCEDURE — 1126F PR PAIN SEVERITY QUANTIFIED, NO PAIN PRESENT: ICD-10-PCS | Mod: CPTII,S$GLB,, | Performed by: INTERNAL MEDICINE

## 2022-03-30 PROCEDURE — 3288F FALL RISK ASSESSMENT DOCD: CPT | Mod: CPTII,S$GLB,, | Performed by: INTERNAL MEDICINE

## 2022-03-30 PROCEDURE — 1160F RVW MEDS BY RX/DR IN RCRD: CPT | Mod: CPTII,S$GLB,, | Performed by: INTERNAL MEDICINE

## 2022-03-30 PROCEDURE — 1160F PR REVIEW ALL MEDS BY PRESCRIBER/CLIN PHARMACIST DOCUMENTED: ICD-10-PCS | Mod: CPTII,S$GLB,, | Performed by: INTERNAL MEDICINE

## 2022-03-30 PROCEDURE — 3051F HG A1C>EQUAL 7.0%<8.0%: CPT | Mod: CPTII,S$GLB,, | Performed by: INTERNAL MEDICINE

## 2022-03-30 PROCEDURE — 99999 PR PBB SHADOW E&M-EST. PATIENT-LVL V: CPT | Mod: PBBFAC,,, | Performed by: INTERNAL MEDICINE

## 2022-03-30 PROCEDURE — 1101F PR PT FALLS ASSESS DOC 0-1 FALLS W/OUT INJ PAST YR: ICD-10-PCS | Mod: CPTII,S$GLB,, | Performed by: INTERNAL MEDICINE

## 2022-03-30 PROCEDURE — 3074F PR MOST RECENT SYSTOLIC BLOOD PRESSURE < 130 MM HG: ICD-10-PCS | Mod: CPTII,S$GLB,, | Performed by: INTERNAL MEDICINE

## 2022-03-31 NOTE — PROGRESS NOTES
Subjective:       Patient ID: Jarrett Charlton Jr. is a 72 y.o. male.    Chief Complaint: Follow-up (2 wk)    HPI  Pt visit today for f/u he was in ER again for IVF  Feels better usually get IVF in Dr Dao office q 2 weeks today pt feel fine much better no sob cp abd pain no n/v/d   Review of Systems    Objective:      Physical Exam  Vitals and nursing note reviewed.   Constitutional:       General: He is not in acute distress.     Appearance: He is well-developed.   HENT:      Head: Normocephalic and atraumatic.      Right Ear: External ear normal.      Left Ear: External ear normal.      Nose: Nose normal.      Mouth/Throat:      Pharynx: No oropharyngeal exudate.   Eyes:      General:         Right eye: No discharge.         Left eye: No discharge.      Conjunctiva/sclera: Conjunctivae normal.      Pupils: Pupils are equal, round, and reactive to light.   Neck:      Thyroid: No thyromegaly.   Cardiovascular:      Rate and Rhythm: Normal rate and regular rhythm.      Heart sounds: Normal heart sounds. No murmur heard.    No friction rub. No gallop.   Pulmonary:      Effort: Pulmonary effort is normal. No respiratory distress.      Breath sounds: Normal breath sounds. No wheezing.   Abdominal:      General: Bowel sounds are normal. There is no distension.      Palpations: Abdomen is soft.      Tenderness: There is no abdominal tenderness.      Comments: iliostomy functioning well no swelling erythema etc   Musculoskeletal:         General: No tenderness or deformity. Normal range of motion.      Cervical back: Normal range of motion and neck supple.   Lymphadenopathy:      Cervical: No cervical adenopathy.   Skin:     General: Skin is warm and dry.      Capillary Refill: Capillary refill takes less than 2 seconds.      Findings: No erythema or rash.   Neurological:      Mental Status: He is alert and oriented to person, place, and time.   Psychiatric:         Thought Content: Thought content normal.          Judgment: Judgment normal.         Assessment:       1. Essential hypertension    2. Type 2 diabetes mellitus with stage 3b chronic kidney disease, without long-term current use of insulin    3. Dehydration        Plan:       Essential hypertension  Comments:  well controlled    Type 2 diabetes mellitus with stage 3b chronic kidney disease, without long-term current use of insulin  Comments:  well controlled low Hbga1c    Dehydration  Comments:  resolve with IVF in ER        Medication List with Changes/Refills   Current Medications    ACCU-CHEK PAUL CONTROL SOLN SOLN    1 drop by NOT APPLICABLE route once daily.    ACCU-CHEK SOFTCLIX LANCETS MISC    1 lancet by NOT APPLICABLE route once daily.    ALLOPURINOL (ZYLOPRIM) 300 MG TABLET    Take 1.5 tablets (450 mg total) by mouth once daily.    ALPRAZOLAM (XANAX) 0.5 MG TABLET    Take 0.5 mg by mouth nightly as needed.    AMIODARONE (PACERONE) 200 MG TAB    Take 1 tablet (200 mg total) by mouth 2 (two) times daily.    AMLODIPINE (NORVASC) 5 MG TABLET        ASPIRIN (ECOTRIN) 81 MG EC TABLET    Take 81 mg by mouth once daily.    BD ALCOHOL SWABS PAD        BLOOD SUGAR DIAGNOSTIC (ONE TOUCH ULTRA TEST) [facility]    USE ONE STRIP TO CHECK GLUCOSE EVERY DAY    BRIMONIDINE 0.2% (ALPHAGAN) 0.2 % DROP    INSTILL 1 DROP INTO EACH EYE TWICE DAILY    CALCIUM CITRATE-VITAMIN D3 (CITRACAL + D MAXIMUM) 315 MG-6.25 MCG (250 UNIT) TAB    Take 1 tablet by mouth once daily.    CARVEDILOL (COREG) 12.5 MG TABLET    Take 6.25 mg by mouth 2 (two) times daily with meals.     CLOPIDOGREL (PLAVIX) 75 MG TABLET    once daily.     DIPHENOXYLATE-ATROPINE 2.5-0.025 MG (LOMOTIL) 2.5-0.025 MG PER TABLET    Take 1 tablet by mouth 4 (four) times daily.    FIBER, CALCIUM POLYCARBOPHIL, 625 MG TABLET    Take 1 tablet by mouth once daily.    FLUDROCORTISONE (FLORINEF) 0.1 MG TAB        GABAPENTIN (NEURONTIN) 600 MG TABLET    Take 1 tablet (600 mg total) by mouth 2 (two) times daily.    GLIMEPIRIDE (AMARYL)  4 MG TABLET    Take 4 mg by mouth 2 (two) times a day.    GUAIFENESIN-CODEINE 100-10 MG/5 ML (TUSSI-ORGANIDIN NR)  MG/5 ML SYRUP    Take 5 mLs by mouth every 6 (six) hours as needed for Cough.    HYDROCODONE-ACETAMINOPHEN (NORCO) 5-325 MG PER TABLET    Take 1 tablet by mouth every 12 (twelve) hours as needed for Pain.    MAGNESIUM OXIDE (MAG-OX) 400 MG (241.3 MG MAGNESIUM) TABLET    Take 400 mg by mouth once daily.    MUPIROCIN (BACTROBAN) 2 % OINTMENT    Apply topically 2 (two) times daily.    NORTHERA 300 MG CAP    Take 1 capsule by mouth 3 (three) times daily.    PANTOPRAZOLE (PROTONIX) 40 MG TABLET    Take 40 mg by mouth once daily.    PRAVASTATIN (PRAVACHOL) 40 MG TABLET    Take 1 tablet (40 mg total) by mouth once daily.

## 2022-04-04 ENCOUNTER — TELEPHONE (OUTPATIENT)
Dept: NEPHROLOGY | Facility: CLINIC | Age: 72
End: 2022-04-04
Payer: MEDICARE

## 2022-04-04 ENCOUNTER — HOSPITAL ENCOUNTER (OUTPATIENT)
Facility: HOSPITAL | Age: 72
Discharge: HOME | End: 2022-04-06
Attending: EMERGENCY MEDICINE | Admitting: EMERGENCY MEDICINE
Payer: MEDICARE

## 2022-04-04 ENCOUNTER — TELEPHONE (OUTPATIENT)
Dept: INFECTIOUS DISEASES | Facility: HOSPITAL | Age: 72
End: 2022-04-04
Payer: MEDICARE

## 2022-04-04 ENCOUNTER — OFFICE VISIT (OUTPATIENT)
Dept: NEPHROLOGY | Facility: CLINIC | Age: 72
End: 2022-04-04
Payer: MEDICARE

## 2022-04-04 VITALS
WEIGHT: 212.94 LBS | HEART RATE: 77 BPM | SYSTOLIC BLOOD PRESSURE: 162 MMHG | DIASTOLIC BLOOD PRESSURE: 95 MMHG | BODY MASS INDEX: 27.33 KG/M2 | OXYGEN SATURATION: 98 % | HEIGHT: 74 IN

## 2022-04-04 DIAGNOSIS — I10 HYPERTENSION, UNSPECIFIED TYPE: ICD-10-CM

## 2022-04-04 DIAGNOSIS — Z93.2 ILEOSTOMY IN PLACE: ICD-10-CM

## 2022-04-04 DIAGNOSIS — K94.19 ALTERED BOWEL ELIMINATION DUE TO INTESTINAL OSTOMY: ICD-10-CM

## 2022-04-04 DIAGNOSIS — E86.0 DEHYDRATION: ICD-10-CM

## 2022-04-04 DIAGNOSIS — K56.51 INTESTINAL ADHESIONS WITH PARTIAL OBSTRUCTION: ICD-10-CM

## 2022-04-04 DIAGNOSIS — N17.9 AKI (ACUTE KIDNEY INJURY): Primary | ICD-10-CM

## 2022-04-04 DIAGNOSIS — E87.6 HYPOKALEMIA: ICD-10-CM

## 2022-04-04 DIAGNOSIS — N18.32 TYPE 2 DIABETES MELLITUS WITH STAGE 3B CHRONIC KIDNEY DISEASE, WITHOUT LONG-TERM CURRENT USE OF INSULIN: ICD-10-CM

## 2022-04-04 DIAGNOSIS — E21.3 HYPERPARATHYROIDISM: ICD-10-CM

## 2022-04-04 DIAGNOSIS — Z93.2 HIGH OUTPUT ILEOSTOMY: ICD-10-CM

## 2022-04-04 DIAGNOSIS — N18.32 STAGE 3B CHRONIC KIDNEY DISEASE: Primary | ICD-10-CM

## 2022-04-04 DIAGNOSIS — E87.5 HYPERKALEMIA: ICD-10-CM

## 2022-04-04 DIAGNOSIS — I10 ESSENTIAL HYPERTENSION: ICD-10-CM

## 2022-04-04 DIAGNOSIS — E11.22 TYPE 2 DIABETES MELLITUS WITH STAGE 3B CHRONIC KIDNEY DISEASE, WITHOUT LONG-TERM CURRENT USE OF INSULIN: ICD-10-CM

## 2022-04-04 DIAGNOSIS — R19.8 HIGH OUTPUT ILEOSTOMY: ICD-10-CM

## 2022-04-04 DIAGNOSIS — N18.32 STAGE 3B CHRONIC KIDNEY DISEASE: ICD-10-CM

## 2022-04-04 DIAGNOSIS — R07.9 CHEST PAIN: ICD-10-CM

## 2022-04-04 LAB
ALBUMIN SERPL BCP-MCNC: 3.1 G/DL (ref 3.5–5.2)
ALP SERPL-CCNC: 114 U/L (ref 55–135)
ALT SERPL W/O P-5'-P-CCNC: 32 U/L (ref 10–44)
ANION GAP SERPL CALC-SCNC: 11 MMOL/L (ref 8–16)
AST SERPL-CCNC: 34 U/L (ref 10–40)
BASOPHILS # BLD AUTO: 0.01 K/UL (ref 0–0.2)
BASOPHILS NFR BLD: 0.2 % (ref 0–1.9)
BILIRUB SERPL-MCNC: 0.5 MG/DL (ref 0.1–1)
BILIRUB UR QL STRIP: NEGATIVE
BUN SERPL-MCNC: 42 MG/DL (ref 8–23)
CALCIUM SERPL-MCNC: 8.6 MG/DL (ref 8.7–10.5)
CHLORIDE SERPL-SCNC: 104 MMOL/L (ref 95–110)
CLARITY UR REFRACT.AUTO: CLEAR
CO2 SERPL-SCNC: 25 MMOL/L (ref 23–29)
COLOR UR AUTO: YELLOW
CREAT SERPL-MCNC: 2.5 MG/DL (ref 0.5–1.4)
CREAT UR-MCNC: 70 MG/DL (ref 23–375)
DIFFERENTIAL METHOD: ABNORMAL
EOSINOPHIL # BLD AUTO: 0.1 K/UL (ref 0–0.5)
EOSINOPHIL NFR BLD: 1.4 % (ref 0–8)
ERYTHROCYTE [DISTWIDTH] IN BLOOD BY AUTOMATED COUNT: 15.4 % (ref 11.5–14.5)
EST. GFR  (AFRICAN AMERICAN): 28.4 ML/MIN/1.73 M^2
EST. GFR  (NON AFRICAN AMERICAN): 24.6 ML/MIN/1.73 M^2
GLUCOSE SERPL-MCNC: 83 MG/DL (ref 70–110)
GLUCOSE UR QL STRIP: ABNORMAL
HCT VFR BLD AUTO: 34.6 % (ref 40–54)
HGB BLD-MCNC: 11.4 G/DL (ref 14–18)
HGB UR QL STRIP: NEGATIVE
HYALINE CASTS UR QL AUTO: 3 /LPF
IMM GRANULOCYTES # BLD AUTO: 0.02 K/UL (ref 0–0.04)
IMM GRANULOCYTES NFR BLD AUTO: 0.3 % (ref 0–0.5)
KETONES UR QL STRIP: ABNORMAL
LACTATE SERPL-SCNC: 1.7 MMOL/L (ref 0.5–2.2)
LEUKOCYTE ESTERASE UR QL STRIP: NEGATIVE
LYMPHOCYTES # BLD AUTO: 1.4 K/UL (ref 1–4.8)
LYMPHOCYTES NFR BLD: 24.1 % (ref 18–48)
MCH RBC QN AUTO: 29.2 PG (ref 27–31)
MCHC RBC AUTO-ENTMCNC: 32.9 G/DL (ref 32–36)
MCV RBC AUTO: 89 FL (ref 82–98)
MICROSCOPIC COMMENT: ABNORMAL
MONOCYTES # BLD AUTO: 0.4 K/UL (ref 0.3–1)
MONOCYTES NFR BLD: 6.8 % (ref 4–15)
NEUTROPHILS # BLD AUTO: 3.9 K/UL (ref 1.8–7.7)
NEUTROPHILS NFR BLD: 67.2 % (ref 38–73)
NITRITE UR QL STRIP: NEGATIVE
NRBC BLD-RTO: 0 /100 WBC
PH UR STRIP: 6 [PH] (ref 5–8)
PLATELET # BLD AUTO: 138 K/UL (ref 150–450)
PMV BLD AUTO: 10.8 FL (ref 9.2–12.9)
POCT GLUCOSE: 112 MG/DL (ref 70–110)
POCT GLUCOSE: 68 MG/DL (ref 70–110)
POTASSIUM SERPL-SCNC: 3.4 MMOL/L (ref 3.5–5.1)
PROT SERPL-MCNC: 6.3 G/DL (ref 6–8.4)
PROT UR QL STRIP: NEGATIVE
RBC # BLD AUTO: 3.9 M/UL (ref 4.6–6.2)
RBC #/AREA URNS AUTO: 1 /HPF (ref 0–4)
SODIUM SERPL-SCNC: 140 MMOL/L (ref 136–145)
SODIUM UR-SCNC: 50 MMOL/L (ref 20–250)
SP GR UR STRIP: 1.01 (ref 1–1.03)
URN SPEC COLLECT METH UR: ABNORMAL
WBC # BLD AUTO: 5.73 K/UL (ref 3.9–12.7)

## 2022-04-04 PROCEDURE — 81001 URINALYSIS AUTO W/SCOPE: CPT | Performed by: EMERGENCY MEDICINE

## 2022-04-04 PROCEDURE — 1126F AMNT PAIN NOTED NONE PRSNT: CPT | Mod: CPTII,S$GLB,, | Performed by: NURSE PRACTITIONER

## 2022-04-04 PROCEDURE — 85025 COMPLETE CBC W/AUTO DIFF WBC: CPT | Performed by: EMERGENCY MEDICINE

## 2022-04-04 PROCEDURE — 25000003 PHARM REV CODE 250: Performed by: HOSPITALIST

## 2022-04-04 PROCEDURE — 99284 EMERGENCY DEPT VISIT MOD MDM: CPT | Mod: ,,, | Performed by: EMERGENCY MEDICINE

## 2022-04-04 PROCEDURE — 63600175 PHARM REV CODE 636 W HCPCS: Performed by: HOSPITALIST

## 2022-04-04 PROCEDURE — 3051F PR MOST RECENT HEMOGLOBIN A1C LEVEL 7.0 - < 8.0%: ICD-10-PCS | Mod: CPTII,S$GLB,, | Performed by: NURSE PRACTITIONER

## 2022-04-04 PROCEDURE — 93010 ELECTROCARDIOGRAM REPORT: CPT | Mod: ,,, | Performed by: INTERNAL MEDICINE

## 2022-04-04 PROCEDURE — 99284 PR EMERGENCY DEPT VISIT,LEVEL IV: ICD-10-PCS | Mod: ,,, | Performed by: EMERGENCY MEDICINE

## 2022-04-04 PROCEDURE — G0378 HOSPITAL OBSERVATION PER HR: HCPCS

## 2022-04-04 PROCEDURE — 3066F NEPHROPATHY DOC TX: CPT | Mod: CPTII,S$GLB,, | Performed by: NURSE PRACTITIONER

## 2022-04-04 PROCEDURE — 96360 HYDRATION IV INFUSION INIT: CPT

## 2022-04-04 PROCEDURE — 99499 UNLISTED E&M SERVICE: CPT | Mod: S$GLB,,, | Performed by: NURSE PRACTITIONER

## 2022-04-04 PROCEDURE — 99215 PR OFFICE/OUTPT VISIT, EST, LEVL V, 40-54 MIN: ICD-10-PCS | Mod: S$GLB,,, | Performed by: NURSE PRACTITIONER

## 2022-04-04 PROCEDURE — 1160F PR REVIEW ALL MEDS BY PRESCRIBER/CLIN PHARMACIST DOCUMENTED: ICD-10-PCS | Mod: CPTII,S$GLB,, | Performed by: NURSE PRACTITIONER

## 2022-04-04 PROCEDURE — 3077F SYST BP >= 140 MM HG: CPT | Mod: CPTII,S$GLB,, | Performed by: NURSE PRACTITIONER

## 2022-04-04 PROCEDURE — 99499 RISK ADDL DX/OHS AUDIT: ICD-10-PCS | Mod: S$GLB,,, | Performed by: NURSE PRACTITIONER

## 2022-04-04 PROCEDURE — 99220 PR INITIAL OBSERVATION CARE,LEVL III: ICD-10-PCS | Mod: ,,, | Performed by: HOSPITALIST

## 2022-04-04 PROCEDURE — 3288F PR FALLS RISK ASSESSMENT DOCUMENTED: ICD-10-PCS | Mod: CPTII,S$GLB,, | Performed by: NURSE PRACTITIONER

## 2022-04-04 PROCEDURE — 93005 ELECTROCARDIOGRAM TRACING: CPT

## 2022-04-04 PROCEDURE — 1159F PR MEDICATION LIST DOCUMENTED IN MEDICAL RECORD: ICD-10-PCS | Mod: CPTII,S$GLB,, | Performed by: NURSE PRACTITIONER

## 2022-04-04 PROCEDURE — 80053 COMPREHEN METABOLIC PANEL: CPT | Performed by: EMERGENCY MEDICINE

## 2022-04-04 PROCEDURE — 84300 ASSAY OF URINE SODIUM: CPT | Performed by: EMERGENCY MEDICINE

## 2022-04-04 PROCEDURE — 63600175 PHARM REV CODE 636 W HCPCS: Performed by: EMERGENCY MEDICINE

## 2022-04-04 PROCEDURE — 82962 GLUCOSE BLOOD TEST: CPT | Mod: 91

## 2022-04-04 PROCEDURE — 3080F DIAST BP >= 90 MM HG: CPT | Mod: CPTII,S$GLB,, | Performed by: NURSE PRACTITIONER

## 2022-04-04 PROCEDURE — 3288F FALL RISK ASSESSMENT DOCD: CPT | Mod: CPTII,S$GLB,, | Performed by: NURSE PRACTITIONER

## 2022-04-04 PROCEDURE — 3066F PR DOCUMENTATION OF TREATMENT FOR NEPHROPATHY: ICD-10-PCS | Mod: CPTII,S$GLB,, | Performed by: NURSE PRACTITIONER

## 2022-04-04 PROCEDURE — 83605 ASSAY OF LACTIC ACID: CPT | Performed by: EMERGENCY MEDICINE

## 2022-04-04 PROCEDURE — 99215 OFFICE O/P EST HI 40 MIN: CPT | Mod: S$GLB,,, | Performed by: NURSE PRACTITIONER

## 2022-04-04 PROCEDURE — 3051F HG A1C>EQUAL 7.0%<8.0%: CPT | Mod: CPTII,S$GLB,, | Performed by: NURSE PRACTITIONER

## 2022-04-04 PROCEDURE — 1101F PT FALLS ASSESS-DOCD LE1/YR: CPT | Mod: CPTII,S$GLB,, | Performed by: NURSE PRACTITIONER

## 2022-04-04 PROCEDURE — 99999 PR PBB SHADOW E&M-EST. PATIENT-LVL V: ICD-10-PCS | Mod: PBBFAC,,, | Performed by: NURSE PRACTITIONER

## 2022-04-04 PROCEDURE — 3008F PR BODY MASS INDEX (BMI) DOCUMENTED: ICD-10-PCS | Mod: CPTII,S$GLB,, | Performed by: NURSE PRACTITIONER

## 2022-04-04 PROCEDURE — 99220 PR INITIAL OBSERVATION CARE,LEVL III: CPT | Mod: ,,, | Performed by: HOSPITALIST

## 2022-04-04 PROCEDURE — 1101F PR PT FALLS ASSESS DOC 0-1 FALLS W/OUT INJ PAST YR: ICD-10-PCS | Mod: CPTII,S$GLB,, | Performed by: NURSE PRACTITIONER

## 2022-04-04 PROCEDURE — 99285 EMERGENCY DEPT VISIT HI MDM: CPT | Mod: 25

## 2022-04-04 PROCEDURE — 1126F PR PAIN SEVERITY QUANTIFIED, NO PAIN PRESENT: ICD-10-PCS | Mod: CPTII,S$GLB,, | Performed by: NURSE PRACTITIONER

## 2022-04-04 PROCEDURE — 96361 HYDRATE IV INFUSION ADD-ON: CPT | Performed by: EMERGENCY MEDICINE

## 2022-04-04 PROCEDURE — 3077F PR MOST RECENT SYSTOLIC BLOOD PRESSURE >= 140 MM HG: ICD-10-PCS | Mod: CPTII,S$GLB,, | Performed by: NURSE PRACTITIONER

## 2022-04-04 PROCEDURE — 93010 EKG 12-LEAD: ICD-10-PCS | Mod: ,,, | Performed by: INTERNAL MEDICINE

## 2022-04-04 PROCEDURE — 99999 PR PBB SHADOW E&M-EST. PATIENT-LVL V: CPT | Mod: PBBFAC,,, | Performed by: NURSE PRACTITIONER

## 2022-04-04 PROCEDURE — 1160F RVW MEDS BY RX/DR IN RCRD: CPT | Mod: CPTII,S$GLB,, | Performed by: NURSE PRACTITIONER

## 2022-04-04 PROCEDURE — 3080F PR MOST RECENT DIASTOLIC BLOOD PRESSURE >= 90 MM HG: ICD-10-PCS | Mod: CPTII,S$GLB,, | Performed by: NURSE PRACTITIONER

## 2022-04-04 PROCEDURE — 3008F BODY MASS INDEX DOCD: CPT | Mod: CPTII,S$GLB,, | Performed by: NURSE PRACTITIONER

## 2022-04-04 PROCEDURE — 1159F MED LIST DOCD IN RCRD: CPT | Mod: CPTII,S$GLB,, | Performed by: NURSE PRACTITIONER

## 2022-04-04 PROCEDURE — 82570 ASSAY OF URINE CREATININE: CPT | Performed by: EMERGENCY MEDICINE

## 2022-04-04 RX ORDER — IBUPROFEN 200 MG
16 TABLET ORAL
Status: DISCONTINUED | OUTPATIENT
Start: 2022-04-04 | End: 2022-04-06 | Stop reason: HOSPADM

## 2022-04-04 RX ORDER — SODIUM CHLORIDE 0.9 % (FLUSH) 0.9 %
10 SYRINGE (ML) INJECTION EVERY 6 HOURS PRN
Status: DISCONTINUED | OUTPATIENT
Start: 2022-04-04 | End: 2022-04-06 | Stop reason: HOSPADM

## 2022-04-04 RX ORDER — CLOPIDOGREL BISULFATE 75 MG/1
75 TABLET ORAL DAILY
Status: DISCONTINUED | OUTPATIENT
Start: 2022-04-05 | End: 2022-04-06 | Stop reason: HOSPADM

## 2022-04-04 RX ORDER — GABAPENTIN 300 MG/1
300 CAPSULE ORAL 2 TIMES DAILY
Refills: 3 | Status: DISCONTINUED | OUTPATIENT
Start: 2022-04-04 | End: 2022-04-06 | Stop reason: HOSPADM

## 2022-04-04 RX ORDER — PRAVASTATIN SODIUM 40 MG/1
80 TABLET ORAL DAILY
Status: DISCONTINUED | OUTPATIENT
Start: 2022-04-05 | End: 2022-04-06 | Stop reason: HOSPADM

## 2022-04-04 RX ORDER — ASPIRIN 81 MG/1
81 TABLET ORAL DAILY
Status: DISCONTINUED | OUTPATIENT
Start: 2022-04-05 | End: 2022-04-06 | Stop reason: HOSPADM

## 2022-04-04 RX ORDER — AMIODARONE HYDROCHLORIDE 200 MG/1
200 TABLET ORAL 2 TIMES DAILY
Status: DISCONTINUED | OUTPATIENT
Start: 2022-04-04 | End: 2022-04-06 | Stop reason: HOSPADM

## 2022-04-04 RX ORDER — ACETAMINOPHEN 325 MG/1
650 TABLET ORAL EVERY 4 HOURS PRN
Status: DISCONTINUED | OUTPATIENT
Start: 2022-04-04 | End: 2022-04-06 | Stop reason: HOSPADM

## 2022-04-04 RX ORDER — BRIMONIDINE TARTRATE 2 MG/ML
1 SOLUTION/ DROPS OPHTHALMIC 2 TIMES DAILY
Status: DISCONTINUED | OUTPATIENT
Start: 2022-04-04 | End: 2022-04-06 | Stop reason: HOSPADM

## 2022-04-04 RX ORDER — IBUPROFEN 200 MG
24 TABLET ORAL
Status: DISCONTINUED | OUTPATIENT
Start: 2022-04-04 | End: 2022-04-06 | Stop reason: HOSPADM

## 2022-04-04 RX ORDER — LOPERAMIDE HYDROCHLORIDE 2 MG/1
2 CAPSULE ORAL 4 TIMES DAILY PRN
Status: DISCONTINUED | OUTPATIENT
Start: 2022-04-04 | End: 2022-04-06 | Stop reason: HOSPADM

## 2022-04-04 RX ORDER — SODIUM CHLORIDE 0.9 % (FLUSH) 0.9 %
10 SYRINGE (ML) INJECTION
Status: CANCELLED | OUTPATIENT
Start: 2022-04-29

## 2022-04-04 RX ORDER — GLUCAGON 1 MG
1 KIT INJECTION
Status: DISCONTINUED | OUTPATIENT
Start: 2022-04-04 | End: 2022-04-06 | Stop reason: HOSPADM

## 2022-04-04 RX ORDER — PROCHLORPERAZINE EDISYLATE 5 MG/ML
5 INJECTION INTRAMUSCULAR; INTRAVENOUS EVERY 6 HOURS PRN
Status: DISCONTINUED | OUTPATIENT
Start: 2022-04-04 | End: 2022-04-06 | Stop reason: HOSPADM

## 2022-04-04 RX ORDER — NALOXONE HCL 0.4 MG/ML
0.02 VIAL (ML) INJECTION
Status: DISCONTINUED | OUTPATIENT
Start: 2022-04-04 | End: 2022-04-06 | Stop reason: HOSPADM

## 2022-04-04 RX ORDER — TALC
6 POWDER (GRAM) TOPICAL NIGHTLY PRN
Status: DISCONTINUED | OUTPATIENT
Start: 2022-04-04 | End: 2022-04-06 | Stop reason: HOSPADM

## 2022-04-04 RX ORDER — SODIUM CHLORIDE, SODIUM LACTATE, POTASSIUM CHLORIDE, CALCIUM CHLORIDE 600; 310; 30; 20 MG/100ML; MG/100ML; MG/100ML; MG/100ML
INJECTION, SOLUTION INTRAVENOUS CONTINUOUS
Status: ACTIVE | OUTPATIENT
Start: 2022-04-04 | End: 2022-04-05

## 2022-04-04 RX ORDER — INSULIN ASPART 100 [IU]/ML
0-5 INJECTION, SOLUTION INTRAVENOUS; SUBCUTANEOUS
Status: DISCONTINUED | OUTPATIENT
Start: 2022-04-04 | End: 2022-04-06 | Stop reason: HOSPADM

## 2022-04-04 RX ORDER — DROXIDOPA 100 MG/1
100 CAPSULE ORAL 3 TIMES DAILY
COMMUNITY
Start: 2022-03-12 | End: 2022-04-04 | Stop reason: CLARIF

## 2022-04-04 RX ORDER — ALPRAZOLAM 0.5 MG/1
0.5 TABLET ORAL NIGHTLY PRN
Status: DISCONTINUED | OUTPATIENT
Start: 2022-04-04 | End: 2022-04-06 | Stop reason: HOSPADM

## 2022-04-04 RX ORDER — HEPARIN 100 UNIT/ML
500 SYRINGE INTRAVENOUS
Status: CANCELLED | OUTPATIENT
Start: 2022-04-29

## 2022-04-04 RX ORDER — SODIUM CHLORIDE 0.9 % (FLUSH) 0.9 %
10 SYRINGE (ML) INJECTION
Status: DISCONTINUED | OUTPATIENT
Start: 2022-04-04 | End: 2022-04-06 | Stop reason: HOSPADM

## 2022-04-04 RX ORDER — CARVEDILOL 12.5 MG/1
12.5 TABLET ORAL 2 TIMES DAILY WITH MEALS
Status: DISCONTINUED | OUTPATIENT
Start: 2022-04-04 | End: 2022-04-06 | Stop reason: HOSPADM

## 2022-04-04 RX ORDER — HEPARIN SODIUM 5000 [USP'U]/ML
5000 INJECTION, SOLUTION INTRAVENOUS; SUBCUTANEOUS EVERY 8 HOURS
Status: DISCONTINUED | OUTPATIENT
Start: 2022-04-05 | End: 2022-04-06 | Stop reason: HOSPADM

## 2022-04-04 RX ADMIN — AMIODARONE HYDROCHLORIDE 200 MG: 200 TABLET ORAL at 11:04

## 2022-04-04 RX ADMIN — CARVEDILOL 12.5 MG: 12.5 TABLET, FILM COATED ORAL at 11:04

## 2022-04-04 RX ADMIN — SODIUM CHLORIDE, SODIUM LACTATE, POTASSIUM CHLORIDE, AND CALCIUM CHLORIDE: .6; .31; .03; .02 INJECTION, SOLUTION INTRAVENOUS at 11:04

## 2022-04-04 RX ADMIN — GABAPENTIN 300 MG: 300 CAPSULE ORAL at 11:04

## 2022-04-04 RX ADMIN — SODIUM CHLORIDE, SODIUM LACTATE, POTASSIUM CHLORIDE, AND CALCIUM CHLORIDE 1000 ML: .6; .31; .03; .02 INJECTION, SOLUTION INTRAVENOUS at 03:04

## 2022-04-04 NOTE — ED TRIAGE NOTES
Jarrett Charlton Jr., a 72 y.o. male presents to the ED w/ complaint of abnormal kidney lab. Pt reports that his PCP referred him to ED due to elevated creatinine level. Pt reports abdominal pain and bloating for the past 2 weeks, as well as increased output in ileostomy bag. Pt denies n/v/d, constipation, HA, SOB, fever/chills.     Triage note:  Chief Complaint   Patient presents with    Abnormal Lab     Pt sent by MD for eval of abnormal kidney function.      Review of patient's allergies indicates:   Allergen Reactions    Crestor [rosuvastatin]     Lipitor [atorvastatin]      Past Medical History:   Diagnosis Date    Basal cell carcinoma     BPH (benign prostatic hyperplasia)     s/p TURP    Carotid stenosis     Chronic kidney disease     Claudication     Coronary artery disease     DDD (degenerative disc disease) 10/21/2013    Diabetes mellitus with renal complications     Disc disease, degenerative, cervical     Encounter for blood transfusion     GERD (gastroesophageal reflux disease)     Gout, chronic     History of ulcerative colitis     s/p colectomy and ileostomy    HLD (hyperlipidemia)     HTN (hypertension)     Ileostomy in place 1982    RBBB     Squamous cell carcinoma 03/08/2018    Left superior helix near insertion    Squamous cell carcinoma 04/12/2018    Left forearm x 5    Ventricular tachycardia      LOC: The patient is awake, alert, and oriented to self, place, time, and situation. Pt is calm and cooperative. Affect is appropriate.  Speech is appropriate and clear.     APPEARANCE: Patient resting comfortably in no acute distress.  Patient is clean and well groomed.    SKIN: The skin is warm and dry; color consistent with ethnicity.  Patient has normal skin turgor and moist mucus membranes.  Skin intact; no breakdown or bruising noted.     MUSCULOSKELETAL: Patient moving upper and lower extremities without difficulty; denies pain in the extremities or back.  Denies weakness.      RESPIRATORY: Airway is open and patent. Respirations spontaneous, even, easy, and non-labored.  Patient has a normal effort and rate.  No accessory muscle use noted. Denies cough.     CARDIAC:  Normal rate noted.  No peripheral edema noted. No complaints of chest pain.      ABDOMEN: Tender to palpation. Distention noted. Pt reports abdominal pain; denies nausea, vomiting, diarrhea, or constipation. Reports increased output in ileostomy bag.     NEUROLOGIC: Eyes open spontaneously.  Behavior appropriate to situation.  Follows commands; facial expression symmetrical.  Purposeful motor response noted; normal sensation in all extremities. Pt denies headache; denies lightheadedness or dizziness; denies visual disturbances; denies loss of balance; denies unilateral weakness.

## 2022-04-04 NOTE — PROGRESS NOTES
"  Subjective:       Patient ID: Jarrett Charlton Jr. is a 72 y.o. White male who presents for follow-up evaluation of   CKD, hyperkalemia    HPI    Last seen by me in May 2021. Previously seen by Dr. Lyon in Aug 2019.      Patient presents for f/u of CKD.  Baseline creatinine had been 1.3-1.5 c frequent increases to 1.6-1.8 range an occasional AKIs c higher sCr. Had GRADY in late December 2019 c peak sCr of 2.3 mg/dL 2/2 volume depletion from high output in ileostomy. Has had hypomagnesemia in the past.  Had K of 5.9 in November; provided with K finder. Since December 2019, new sCr baseline 1.4-1.6 mg/dL. Has been 2.0-2.1 since May 2021.    Home BPs: high in morning; 15 minutes after waking it decreases and is "low all day" until about 5 p.m., it goes up, then he takes his medications.  Recent loop recorder implanted. Has been getting IVF weekly to every other week by cardiologist at [place]. Also recently started Florinef by PCP.    Heart cath on 4/15/21; no significant blockages. Was sent to [facility] and told he needs a pacemaker. Says this is scheduled 5/13. Has been told to be taking pedialyte; still with diarrhea occasionally but much improved overall.    Significant hx includes Hypertension for 5 years, DM for 3 yrs, gout, ulcerative colitis with ostomy and hyperlipidemia, prostate cancer    The patient denies taking NSAIDs.  Had IV contrast with cardiologist for MRI on 4/3/2020.     Social history: retired, took care of wheelchair-bound wife, but she passed away in July 2020  Significant family hx includes: no known kidney disease in family.    Last renal US: Sept 2019, reviewed. Simple left renal cyst.    Update August 2021:  Colostomy output still more solid "oatmeal" texture with water. Takes immodium if output is more liquid.  Still getting IVF infusions every other week by cardiology.  No recent syncopal episodes.  Home BPs: occasionally SBP dropping to 100; takes BP prior to taking meds and holds meds " "if needed.  Did not take meds yet today because pressure was low today.  Now on Northera for low BP per cardiology.    Denies NSAIDs; no recent hospitalizations.     Update December 2021:   Returns for f/u of CKD.  Baseline sCr 1.7-1.8 mg/dL.  Home BPs: 130-160/60-90; occasionally drops to SBP of high 80s and takes midodrine   Feels dizzy and lightheaded when blood glucose drops below 80  Denies NSAIDs; no recent hospitalizations.   Diarrhea now being treated by Dr. Baum as IBS. Viberzi was just approved but has not been started yet. Still with intermittent diarrhea. Still getting IVF per cardiology every other week.    Update 4/4/22:  Returns for f/u of CKD.  Baseline sCr 1.7-1.8 mg/dL.  Recent sCr 1.3-1.5 during admission for small bowel obstruction March 9-March 11.  Still gets IVF c Dr. Dao (cardiology) q 2 weeks. Dr. Dao is moving to [place], so pt may not be able to drive for this every two weeks. Next IVF is scheduled for 4/15/22.  Home BPs: labile at home. He takes Wendy if SBP gets as low as 110 and he takes his antihypertensives when SBP gets to 130-140. He took his antihypertensive medication while in the office today.    Review of Systems   Cardiovascular:  Denies swelling to legs.  Gastrointestinal:         Lower output than previously.  Genitourinary: Negative for difficulty urinating, dysuria, hematuria. Nocturia. Sometimes has false sense of urge to urinate.          Objective:       Blood pressure (!) 162/95, pulse 77, height 6' 2" (1.88 m), weight 96.6 kg (212 lb 15.4 oz), SpO2 98 %.  Physical Exam  Vitals signs reviewed.   Constitutional:       General: He is not in acute distress.     Appearance: Normal appearance. He is well-developed. He is not ill-appearing or diaphoretic.   Cardiac: normal rate  Pulmonary:  NAD    Musculoskeletal:      Right lower leg: No edema  Left lower leg: No edema  Skin:     General: Skin is warm and dry.   Some brown/red discoloration to BLE  Neurological:     "  Mental Status: He is alert and oriented to person, place, and time.   Psychiatric:         Mood and Affect: Mood normal.         Behavior: Behavior normal.         Thought Content: Thought content normal.         Judgment: Judgment normal.         Lab Results   Component Value Date    CREATININE 1.3 03/11/2022    URICACID 3.8 09/29/2021     Prot/Creat Ratio, Urine   Date Value Ref Range Status   03/04/2022 0.09 0.00 - 0.20 Final   12/16/2021 0.09 0.00 - 0.20 Final   10/29/2021 0.09 0.00 - 0.20 Final     Lab Results   Component Value Date     03/11/2022    K 3.3 (L) 03/11/2022    CO2 22 (L) 03/11/2022     03/11/2022     Lab Results   Component Value Date    PTH 97.5 (H) 03/04/2022    CALCIUM 9.2 03/11/2022    PHOS 3.7 03/04/2022     Lab Results   Component Value Date    HGB 13.0 (L) 03/11/2022    WBC 7.16 03/11/2022    HCT 41.3 03/11/2022      Lab Results   Component Value Date    HGBA1C 7.0 (H) 03/04/2022     03/11/2022    BUN 17 03/11/2022     Lab Results   Component Value Date    LDLCALC 72.6 03/04/2022         Outside labs 10/24/19  Crt: 1.5 mg/dL  Hgb: 15.2 g/dL  CO2: 20 mmol/L  K: 5.9 mmol/L  A1c: 7.1%    Assessment:       1. Stage 3b chronic kidney disease    2. Hypertension, unspecified type    3. Altered bowel elimination due to intestinal ostomy    4. Hyperkalemia    5. Hypokalemia    6. Hyperparathyroidism        Plan:     CKD stage 3B c eGFR 43-50 mL/min- 2/2 age-related nephron loss, HTN, previous NSAID use for gout, volume depletion episodes c high ostomy output. Stable; non-proteinuric.  No longer using NSAIDs;  At risk for progression given frequent AKIs. On PPI.     Gets IVF q 2 weeks per his cardiologist, Dr. Dao, at [facility]. Pt says Dr. Dao will be relocating to [place], and he is unsure if he will be able to drive there q 2 weeks for IVF.  I called and spoke to Dr. Dao's nurse, Emilia, who administers the IVF to Mr. Charlton. She said they typically give 1  L NS q 2 weeks. If he has recently been hospitalized or is feeling more dehydrated, they will sometimes receive orders for 2 L NS.    UPCR No significant proteinuria. Lisinopril previously d/c to help decrease risk of frequent AKIs.     Acid-base Low serum bicarb x 1 during hospitalization.    Renal osteodystrophy PTH mildly elevated. Ca WNL. On ergocalciferol.   Anemia At goal for CKD   DM At goal for CKD.   Lipid Management On statin per PCP     HTN - High today in clinic but pt took medication while here; he said SBP was 118 at home today. Cardiology typically adjusts medications. On amiodarone 200 mg daily, amlodipine 2.5 mg vs. 5 mg On Northera.  - Recommend that SBP be maintained over 110  - Had had fewer hypotensive episodes recently    Hyperkalemia - actually was hypokalemic during hospitalization. Has increased K in diet. Recheck BMP today.    All questions patient had were answered.  Asked if further questions. None. F/u in 3 mos with labs and urine prior to next visit or sooner if needed.  ER for emergency concerns.    Note: [employer] participant; took 6 mos survey previously.    Summary of Plan:  1. BMP today  2. avoid NSAID/ bactrim/ IV contrast/ gadolinium/ aminoglycoside where possible  3. 1 L NS IVF q 2 weeks, starting 4/29/22  4. RTC in 3 mos    After appt: Labs returned c GRADY. No obvious etiology. Discussed with Dr. Lyon. Will tell pt to report to [facility]

## 2022-04-04 NOTE — TELEPHONE ENCOUNTER
Called pt to schedule NS fluid infusions every 2 weeks starting on 4/29th, pt given infusion call back number [phone]

## 2022-04-04 NOTE — TELEPHONE ENCOUNTER
----- Message from Bianka Arevalo NP sent at 4/4/2022  1:06 PM CDT -----  Pls call pt and inform him kidney function is much lower than usual today, down to about 19-22%.  There is no clear cause of this because he states that he has been staying very hydrated.  I discussed with Dr. Lyon, and I would like him to go to the emergency room today to have this further evaluated.    You can also inform him that I will work on getting the IV fluid ordered here to  where Dr. Dao leaves off when he moves. His first here at [facility] should be around 4/29.    Thanks,  Bianka

## 2022-04-04 NOTE — ED PROVIDER NOTES
"Encounter Date: 4/4/2022       History     Chief Complaint   Patient presents with    Abnormal Lab     Pt sent by MD for eval of abnormal kidney function.      HPI   73 Y/O M sent from nephrology 2/2 increased Cr. Patient w/Hx of UC leading to full colectomy (1985) and ileostomy with "tons of output from bag". He reports "tripling"amount of output. He does report abd pain, bloating/distension in last 2-3 weeks with no nausea or vomiting. No F/C reported. No urinary complaints. He does report lightheaded (non-rotatory) when getting up too quickly, but CP, CPOE, dyspnea, WILSON, orthopnea and decrease exercise tolerance.  He reports previous admissions in the past because of high output from ileostomy bag.  He does report attempting increasing fiber with no improvement in his outputs.  Otherwise no other complaints.    Review of patient's allergies indicates:   Allergen Reactions    Crestor [rosuvastatin]     Lipitor [atorvastatin]      Past Medical History:   Diagnosis Date    Basal cell carcinoma     BPH (benign prostatic hyperplasia)     s/p TURP    Carotid stenosis     Chronic kidney disease     Claudication     Coronary artery disease     DDD (degenerative disc disease) 10/21/2013    Diabetes mellitus with renal complications     Disc disease, degenerative, cervical     Encounter for blood transfusion     GERD (gastroesophageal reflux disease)     Gout, chronic     History of ulcerative colitis     s/p colectomy and ileostomy    HLD (hyperlipidemia)     HTN (hypertension)     Ileostomy in place 1982    RBBB     Squamous cell carcinoma 03/08/2018    Left superior helix near insertion    Squamous cell carcinoma 04/12/2018    Left forearm x 5    Ventricular tachycardia      Past Surgical History:   Procedure Laterality Date    cardiac stents      CATARACT EXTRACTION Bilateral     CERVICAL FUSION      colectomy and ileostomy  1985    EXCISION OF PAROTID GLAND Left 12/18/2020    Procedure: " "EXCISION, PAROTID GLAND;  Surgeon: Michael Pinzon MD;  Location: [facility] OR 2ND FLR;  Service: ENT;  Laterality: Left;    INSERTION OF IMPLANTABLE LOOP RECORDER  06/07/2021    INSERTION OF IMPLANTABLE LOOP RECORDER Left 6/7/2021    Procedure: INSERTION, IMPLANTABLE LOOP RECORDER;  Surgeon: Romario Dao MD;  Location: [facility] [place] CATH LAB;  Service: Cardiology;  Laterality: Left;    LEFT HEART CATHETERIZATION Right 4/15/2021    Procedure: CATHETERIZATION, HEART, LEFT;  Surgeon: Marcio Jones MD;  Location: [facility] [place] CATH LAB;  Service: Cardiology;  Laterality: Right;    LYSIS OF ADHESIONS N/A 11/9/2020    Procedure: LYSIS, ADHESIONS,  ERAS low;  Surgeon: CED Haley MD;  Location: [facility] OR 2ND FLR;  Service: Colon and Rectal;  Laterality: N/A;    REPAIR, HERNIA, PARASTOMAL N/A 11/9/2020    Procedure: REPAIR, HERNIA, PARASTOMAL;  Surgeon: CED Haley MD;  Location: [facility] OR 2ND FLR;  Service: Colon and Rectal;  Laterality: N/A;    TRANSURETHRAL RESECTION OF PROSTATE (TURP) WITHOUT USE OF LASER N/A 1/23/2019    Procedure: TURP, WITHOUT USING LASER BIPOLAR;  Surgeon: Catarino Mota MD;  Location: [facility] OR 1ST FLR;  Service: Urology;  Laterality: N/A;  1.5 HOURS     Family History   Problem Relation Age of Onset    Cancer Father     Diabetes Father     Dementia Mother     Melanoma Neg Hx     Hypertension Neg Hx     Arthritis Neg Hx      Social History     Tobacco Use    Smoking status: Never Smoker    Smokeless tobacco: Never Used   Substance Use Topics    Alcohol use: No    Drug use: No     Review of Systems  CONST: "Non rotatory lightheadedness when I get up too quickly"; No fever, chills, weight change, or fatigue.  HEENT: No headache, blurry vision/change in vision, sore throat, ear pain, eye pain, otorrhea, rhinorrhea, tooth pain, swelling, or voice changes.  NECK: No pain, masses, trauma, or redness.  HEART: No pain, palpitations, or diaphoresis.  LUNG: No SOB, cough, orthopnea, WILSON or " "other complaints.  ABDOMEN: + "bloated"; No new pain, nausea, vomiting, + increased flatulence and NB brown liquid stool in bag.  : No discharge, dysuria, lesions, rashes, masses, sores.  EXTREMITIES: FROM with No swelling, redness, injuries/trauma, lesions, sores, weakness, numbness, or tingling.  NEURO: No rotatory dizziness, weakness, fatigue, tremors, headache, change in vision or disturbances of balance or coordination.  SKIN: No lesions, rashes, trauma or other complaints.      Physical Exam     Initial Vitals [04/04/22 1421]   BP Pulse Resp Temp SpO2   112/70 78 16 98.2 °F (36.8 °C) 98 %      MAP       --         Physical Exam  GENERAL: Calm; Cooperative; Well-appearing and Non-Toxic; Well-Nourished; NAD.  HEENT: AT/NC; anicteric; speaking full sentences with no slurring of speech or drooling/inability to tolerate oral secretions.  NECK: Supple, FROM with no meningismus, no accessory muscle use.   THORAX/BACK: Atraumatic with NTTP.   HEART: Regular rate and rhythm, no M/G/T.  LUNGS: No Tachypnea, No Retractions, and CTA B/L with no W/R/R.  ABDOMEN:  Soft, ND, NTTP to any quadrant.  Right lower quadrant ostomy with a proximally 100-200 mL of brown liquid stool; No rigidity. No guarding. NEG Bowers's, Rovsing's, or McBurney's point tenderness.  EXTREMITIES: FROM.  No lower extremity asymmetry or edema.  SKIN: Warm, Dry, No Skin Tears or Rashes.  VASCULAR: 2+ pulses Prox/Dist & Symmetrical with No delay.  NEUROLOGIC: AAOx3; answering questions appropriate with    ED Course   Procedures  Labs Reviewed   CBC W/ AUTO DIFFERENTIAL - Abnormal; Notable for the following components:       Result Value    RBC 3.90 (*)     Hemoglobin 11.4 (*)     Hematocrit 34.6 (*)     RDW 15.4 (*)     Platelets 138 (*)     All other components within normal limits   COMPREHENSIVE METABOLIC PANEL - Abnormal; Notable for the following components:    Potassium 3.4 (*)     BUN 42 (*)     Creatinine 2.5 (*)     Calcium 8.6 (*)     Albumin " 3.1 (*)     eGFR if  28.4 (*)     eGFR if non  24.6 (*)     All other components within normal limits   URINALYSIS, REFLEX TO URINE CULTURE - Abnormal; Notable for the following components:    Glucose, UA 1+ (*)     Ketones, UA Trace (*)     All other components within normal limits    Narrative:     Specimen Source->Urine   URINALYSIS MICROSCOPIC - Abnormal; Notable for the following components:    Hyaline Casts, UA 3 (*)     All other components within normal limits    Narrative:     Specimen Source->Urine   CREATININE, URINE, RANDOM    Narrative:     Specimen Source->Urine   SODIUM, URINE, RANDOM    Narrative:     Specimen Source->Urine   LACTIC ACID, PLASMA          Imaging Results          CT Abdomen Pelvis  Without Contrast (Final result)  Result time 04/04/22 18:44:40    Final result by Sly Sanford MD (04/04/22 18:44:40)                 Impression:      1. Surgical changes of colectomy with end ileostomy.  No findings to suggest small bowel obstruction.  There is been interval improvement of mesenteric edema anteriorly.  No focal organized fluid collection.  2. Several left renal low attenuating lesions, nonemergent ultrasound could be performed to confirm cystic nature as warranted.  3. Please see above for several additional findings.      Electronically signed by: Sly Sanford MD  Date:    04/04/2022  Time:    18:44             Narrative:    EXAMINATION:  CT ABDOMEN PELVIS WITHOUT CONTRAST    CLINICAL HISTORY:  Abdominal pain, acute, nonlocalized;    TECHNIQUE:  Low dose axial images, sagittal and coronal reformations were obtained from the lung bases to the pubic symphysis.  Oral contrast was not administered.    COMPARISON:  CT 09/10/2021    FINDINGS:  Images of the lower thorax are remarkable for bilateral dependent atelectasis/scarring.  There is left pleural thickening, similar to the previous examination.  There is calcification in the distribution of the  coronary arteries.    The liver, spleen and adrenal glands have a grossly unremarkable noncontrast appearance.  There is atrophic change of the pancreas without pancreatic ductal dilation.  The gallbladder is unremarkable.  No significant biliary dilation.  The stomach is decompressed without wall thickening.  Surgical changes are noted of the anterior aspect of the abdomen.    There is no hydronephrosis or nephrolithiasis.  Several low attenuating lesions arise from the left kidney, the majority of which are too small for characterization.  Some of which measure attenuation suggesting cysts, others measure higher attenuation than would be expected for simple cysts.  The bilateral ureters are unable to be followed in their entirety to the urinary bladder, no definite calculi seen.  The urinary bladder is decompressed.  The prostate is not enlarged.    Surgical changes are noted of colectomy.  There is end ileostomy.  The small bowel is grossly unremarkable noting a few small bowel loops are closely tethered to the anterior abdominal wall without obstruction.  There is slight induration within the anterior aspect of the mesentery, in comparison to the previous examination, this has decreased since that time.  Additionally, the degree of bowel distension has also decreased since previous exam noting essentially decompressed small bowel on current exam.  No focal organized fluid collection.  Several scattered shotty periaortic and paracaval lymph nodes are noted.    There are bilateral fat containing inguinal hernias, left greater than right.  There is osteopenia.  Degenerative changes are noted of the bilateral sacroiliac joints, pubic symphysis, and spine.  No significant inguinal lymphadenopathy.                                 Medications   sodium chloride 0.9% flush 10 mL (has no administration in time range)   melatonin tablet 6 mg (has no administration in time range)   lactated ringers bolus 1,000 mL (0 mLs  Intravenous Stopped 4/4/22 5730)     Medical Decision Making:   History:   Old Medical Records: I decided to obtain old medical records.  Initial Assessment:   Afebrile, atraumatic and hemodynamically stable male and ileostomy status post complete colectomy in 1985. Abdomen benign.  Will obtain labs, as apparently labs were not drawn today in the a.m. except a BMP revealing his GRADY.  Patient is not nauseous, in pain or in any acute distress at this time.  ____________________  Royal Dong MD, [facility]  Emergency Medicine Staff  4:52 PM 4/4/2022    Independently Interpreted Test(s):   I have ordered and independently interpreted EKG Reading(s) - see prior notes  Clinical Tests:   Lab Tests: Ordered  Radiological Study: Ordered  Medical Tests: Ordered                      Clinical Impression:   Final diagnoses:  [N17.9] GRADY (acute kidney injury) (Primary)  [R19.8, Z93.2] High output ileostomy          ED Disposition Condition    Observation               Juan M Dong MD  04/04/22 8450

## 2022-04-04 NOTE — Clinical Note
I put orders in for the infusion center for pt to get NS q 2 weeks from the infusion center. Can you please call them to help coordinate this? It should start 4/29/22. It is replacing the IVF infusions he used to get from cardiology b/c his cardiologist is moving across the [place].  Thanks, Bianka

## 2022-04-05 ENCOUNTER — ANESTHESIA EVENT (OUTPATIENT)
Dept: ENDOSCOPY | Facility: HOSPITAL | Age: 72
End: 2022-04-05
Payer: MEDICARE

## 2022-04-05 LAB
ALBUMIN SERPL BCP-MCNC: 3 G/DL (ref 3.5–5.2)
ALP SERPL-CCNC: 107 U/L (ref 55–135)
ALT SERPL W/O P-5'-P-CCNC: 28 U/L (ref 10–44)
ANION GAP SERPL CALC-SCNC: 11 MMOL/L (ref 8–16)
ANION GAP SERPL CALC-SCNC: 8 MMOL/L (ref 8–16)
AST SERPL-CCNC: 29 U/L (ref 10–40)
BASOPHILS # BLD AUTO: 0.02 K/UL (ref 0–0.2)
BASOPHILS NFR BLD: 0.3 % (ref 0–1.9)
BILIRUB SERPL-MCNC: 0.6 MG/DL (ref 0.1–1)
BUN SERPL-MCNC: 28 MG/DL (ref 8–23)
BUN SERPL-MCNC: 35 MG/DL (ref 8–23)
CALCIUM SERPL-MCNC: 8.4 MG/DL (ref 8.7–10.5)
CALCIUM SERPL-MCNC: 8.8 MG/DL (ref 8.7–10.5)
CHLORIDE SERPL-SCNC: 104 MMOL/L (ref 95–110)
CHLORIDE SERPL-SCNC: 104 MMOL/L (ref 95–110)
CK SERPL-CCNC: 75 U/L (ref 20–200)
CO2 SERPL-SCNC: 28 MMOL/L (ref 23–29)
CO2 SERPL-SCNC: 28 MMOL/L (ref 23–29)
CREAT SERPL-MCNC: 1.9 MG/DL (ref 0.5–1.4)
CREAT SERPL-MCNC: 2.1 MG/DL (ref 0.5–1.4)
CRP SERPL-MCNC: 1.4 MG/L (ref 0–8.2)
DIFFERENTIAL METHOD: ABNORMAL
EOSINOPHIL # BLD AUTO: 0.1 K/UL (ref 0–0.5)
EOSINOPHIL NFR BLD: 2.2 % (ref 0–8)
ERYTHROCYTE [DISTWIDTH] IN BLOOD BY AUTOMATED COUNT: 15.5 % (ref 11.5–14.5)
EST. GFR  (AFRICAN AMERICAN): 35 ML/MIN/1.73 M^2
EST. GFR  (AFRICAN AMERICAN): 39.6 ML/MIN/1.73 M^2
EST. GFR  (NON AFRICAN AMERICAN): 30.3 ML/MIN/1.73 M^2
EST. GFR  (NON AFRICAN AMERICAN): 34.2 ML/MIN/1.73 M^2
GLUCOSE SERPL-MCNC: 118 MG/DL (ref 70–110)
GLUCOSE SERPL-MCNC: 74 MG/DL (ref 70–110)
HCT VFR BLD AUTO: 35.8 % (ref 40–54)
HGB BLD-MCNC: 11.3 G/DL (ref 14–18)
IMM GRANULOCYTES # BLD AUTO: 0.02 K/UL (ref 0–0.04)
IMM GRANULOCYTES NFR BLD AUTO: 0.3 % (ref 0–0.5)
LYMPHOCYTES # BLD AUTO: 1.7 K/UL (ref 1–4.8)
LYMPHOCYTES NFR BLD: 28.9 % (ref 18–48)
MAGNESIUM SERPL-MCNC: 1.6 MG/DL (ref 1.6–2.6)
MCH RBC QN AUTO: 28 PG (ref 27–31)
MCHC RBC AUTO-ENTMCNC: 31.6 G/DL (ref 32–36)
MCV RBC AUTO: 89 FL (ref 82–98)
MONOCYTES # BLD AUTO: 0.4 K/UL (ref 0.3–1)
MONOCYTES NFR BLD: 7.6 % (ref 4–15)
NEUTROPHILS # BLD AUTO: 3.5 K/UL (ref 1.8–7.7)
NEUTROPHILS NFR BLD: 60.7 % (ref 38–73)
NRBC BLD-RTO: 0 /100 WBC
OB PNL STL: POSITIVE
PHOSPHATE SERPL-MCNC: 3.4 MG/DL (ref 2.7–4.5)
PLATELET # BLD AUTO: 149 K/UL (ref 150–450)
PMV BLD AUTO: 10.8 FL (ref 9.2–12.9)
POCT GLUCOSE: 135 MG/DL (ref 70–110)
POCT GLUCOSE: 141 MG/DL (ref 70–110)
POCT GLUCOSE: 163 MG/DL (ref 70–110)
POCT GLUCOSE: 183 MG/DL (ref 70–110)
POTASSIUM SERPL-SCNC: 3.5 MMOL/L (ref 3.5–5.1)
POTASSIUM SERPL-SCNC: 3.9 MMOL/L (ref 3.5–5.1)
PROT SERPL-MCNC: 6.1 G/DL (ref 6–8.4)
RBC # BLD AUTO: 4.03 M/UL (ref 4.6–6.2)
SODIUM SERPL-SCNC: 140 MMOL/L (ref 136–145)
SODIUM SERPL-SCNC: 143 MMOL/L (ref 136–145)
WBC # BLD AUTO: 5.82 K/UL (ref 3.9–12.7)

## 2022-04-05 PROCEDURE — 94761 N-INVAS EAR/PLS OXIMETRY MLT: CPT

## 2022-04-05 PROCEDURE — 99226 PR SUBSEQUENT OBSERVATION CARE,LEVEL III: ICD-10-PCS | Mod: ,,,

## 2022-04-05 PROCEDURE — 82550 ASSAY OF CK (CPK): CPT | Performed by: HOSPITALIST

## 2022-04-05 PROCEDURE — 80048 BASIC METABOLIC PNL TOTAL CA: CPT | Mod: XB

## 2022-04-05 PROCEDURE — 87449 NOS EACH ORGANISM AG IA: CPT

## 2022-04-05 PROCEDURE — 87329 GIARDIA AG IA: CPT

## 2022-04-05 PROCEDURE — 82705 FATS/LIPIDS FECES QUAL: CPT

## 2022-04-05 PROCEDURE — 85025 COMPLETE CBC W/AUTO DIFF WBC: CPT | Performed by: HOSPITALIST

## 2022-04-05 PROCEDURE — 87425 ROTAVIRUS AG IA: CPT

## 2022-04-05 PROCEDURE — 83993 ASSAY FOR CALPROTECTIN FECAL: CPT

## 2022-04-05 PROCEDURE — 82656 EL-1 FECAL QUAL/SEMIQ: CPT

## 2022-04-05 PROCEDURE — G0378 HOSPITAL OBSERVATION PER HR: HCPCS

## 2022-04-05 PROCEDURE — 84100 ASSAY OF PHOSPHORUS: CPT | Performed by: HOSPITALIST

## 2022-04-05 PROCEDURE — 99215 PR OFFICE/OUTPT VISIT, EST, LEVL V, 40-54 MIN: ICD-10-PCS | Mod: ,,, | Performed by: INTERNAL MEDICINE

## 2022-04-05 PROCEDURE — 25000242 PHARM REV CODE 250 ALT 637 W/ HCPCS: Performed by: HOSPITALIST

## 2022-04-05 PROCEDURE — 87209 SMEAR COMPLEX STAIN: CPT

## 2022-04-05 PROCEDURE — 83735 ASSAY OF MAGNESIUM: CPT | Performed by: HOSPITALIST

## 2022-04-05 PROCEDURE — 82272 OCCULT BLD FECES 1-3 TESTS: CPT

## 2022-04-05 PROCEDURE — 87046 STOOL CULTR AEROBIC BACT EA: CPT

## 2022-04-05 PROCEDURE — 25000003 PHARM REV CODE 250: Performed by: HOSPITALIST

## 2022-04-05 PROCEDURE — 89055 LEUKOCYTE ASSESSMENT FECAL: CPT

## 2022-04-05 PROCEDURE — 99226 PR SUBSEQUENT OBSERVATION CARE,LEVEL III: CPT | Mod: ,,,

## 2022-04-05 PROCEDURE — 87427 SHIGA-LIKE TOXIN AG IA: CPT

## 2022-04-05 PROCEDURE — 87338 HPYLORI STOOL AG IA: CPT

## 2022-04-05 PROCEDURE — 36415 COLL VENOUS BLD VENIPUNCTURE: CPT | Performed by: HOSPITALIST

## 2022-04-05 PROCEDURE — 87045 FECES CULTURE AEROBIC BACT: CPT

## 2022-04-05 PROCEDURE — 63600175 PHARM REV CODE 636 W HCPCS

## 2022-04-05 PROCEDURE — 36415 COLL VENOUS BLD VENIPUNCTURE: CPT

## 2022-04-05 PROCEDURE — 96372 THER/PROPH/DIAG INJ SC/IM: CPT | Performed by: HOSPITALIST

## 2022-04-05 PROCEDURE — 80053 COMPREHEN METABOLIC PANEL: CPT | Performed by: HOSPITALIST

## 2022-04-05 PROCEDURE — 96361 HYDRATE IV INFUSION ADD-ON: CPT | Performed by: EMERGENCY MEDICINE

## 2022-04-05 PROCEDURE — 99215 OFFICE O/P EST HI 40 MIN: CPT | Mod: ,,, | Performed by: INTERNAL MEDICINE

## 2022-04-05 PROCEDURE — 86140 C-REACTIVE PROTEIN: CPT

## 2022-04-05 PROCEDURE — 87177 OVA AND PARASITES SMEARS: CPT

## 2022-04-05 PROCEDURE — 63600175 PHARM REV CODE 636 W HCPCS: Performed by: HOSPITALIST

## 2022-04-05 RX ORDER — SODIUM CHLORIDE, SODIUM LACTATE, POTASSIUM CHLORIDE, CALCIUM CHLORIDE 600; 310; 30; 20 MG/100ML; MG/100ML; MG/100ML; MG/100ML
INJECTION, SOLUTION INTRAVENOUS CONTINUOUS
Status: ACTIVE | OUTPATIENT
Start: 2022-04-05 | End: 2022-04-06

## 2022-04-05 RX ORDER — HYDRALAZINE HYDROCHLORIDE 20 MG/ML
10 INJECTION INTRAMUSCULAR; INTRAVENOUS EVERY 8 HOURS PRN
Status: DISCONTINUED | OUTPATIENT
Start: 2022-04-05 | End: 2022-04-06 | Stop reason: HOSPADM

## 2022-04-05 RX ADMIN — AMIODARONE HYDROCHLORIDE 200 MG: 200 TABLET ORAL at 08:04

## 2022-04-05 RX ADMIN — PRAVASTATIN SODIUM 80 MG: 40 TABLET ORAL at 08:04

## 2022-04-05 RX ADMIN — CLOPIDOGREL 75 MG: 75 TABLET, FILM COATED ORAL at 08:04

## 2022-04-05 RX ADMIN — ASPIRIN 81 MG: 81 TABLET, COATED ORAL at 08:04

## 2022-04-05 RX ADMIN — HEPARIN SODIUM 5000 UNITS: 5000 INJECTION INTRAVENOUS; SUBCUTANEOUS at 03:04

## 2022-04-05 RX ADMIN — AMIODARONE HYDROCHLORIDE 200 MG: 200 TABLET ORAL at 09:04

## 2022-04-05 RX ADMIN — SODIUM CHLORIDE, SODIUM LACTATE, POTASSIUM CHLORIDE, AND CALCIUM CHLORIDE: .6; .31; .03; .02 INJECTION, SOLUTION INTRAVENOUS at 05:04

## 2022-04-05 RX ADMIN — HEPARIN SODIUM 5000 UNITS: 5000 INJECTION INTRAVENOUS; SUBCUTANEOUS at 09:04

## 2022-04-05 RX ADMIN — Medication 2 PACKET: at 07:04

## 2022-04-05 RX ADMIN — Medication 2 PACKET: at 05:04

## 2022-04-05 RX ADMIN — GABAPENTIN 300 MG: 300 CAPSULE ORAL at 09:04

## 2022-04-05 RX ADMIN — HEPARIN SODIUM 5000 UNITS: 5000 INJECTION INTRAVENOUS; SUBCUTANEOUS at 05:04

## 2022-04-05 RX ADMIN — SODIUM CHLORIDE, SODIUM LACTATE, POTASSIUM CHLORIDE, AND CALCIUM CHLORIDE: .6; .31; .03; .02 INJECTION, SOLUTION INTRAVENOUS at 10:04

## 2022-04-05 RX ADMIN — GABAPENTIN 300 MG: 300 CAPSULE ORAL at 08:04

## 2022-04-05 RX ADMIN — CARVEDILOL 12.5 MG: 12.5 TABLET, FILM COATED ORAL at 09:04

## 2022-04-05 RX ADMIN — ALLOPURINOL 50 MG: 300 TABLET ORAL at 08:04

## 2022-04-05 RX ADMIN — BRIMONIDINE TARTRATE 1 DROP: 2 SOLUTION OPHTHALMIC at 09:04

## 2022-04-05 RX ADMIN — CARVEDILOL 12.5 MG: 12.5 TABLET, FILM COATED ORAL at 05:04

## 2022-04-05 NOTE — ASSESSMENT & PLAN NOTE
- on amaryl BID at home  -low dose insulin sliding scale and accucheck monitoring while inpatient.   Diabetic diet

## 2022-04-05 NOTE — H&P
"[facility] [facility]  History & Physical    Patient Name: Jarrett Charlton Jr.  MRN: [id number]  Patient Class: OP- Observation  Admission Date: 4/4/2022  Attending Physician: Henri Cabral MD   Primary Care Provider: Poli Gonzalez MD         Patient information was obtained from patient, past medical records and ER records.     Subjective:     Principal Problem:GRADY (acute kidney injury)    Chief Complaint:   Chief Complaint   Patient presents with    Abnormal Lab     Pt sent by MD for eval of abnormal kidney function.         HPI: 72-year-old white man history ulcerative colitis status post colectomy in 1985 with ileostomy, type 2 diabetes not on insulin, hypertension, CAD, CKD, gout, he is referred for hospital admission from the emergency department for concerns of acute kidney injury.    He was admitted to [facility] March 9 to March 11th after presenting with abdominal distension was found with concerns for partial bowel obstruction, managed with NG tube placement and decompression with improvement in symptoms and ultimately discharged home.  He states his cardiologist Dr. Dao has not come to clinic every other week on Fridays to receive intermittent IV fluid administrations usually 1 L fluids (presumed normal saline).  He reports Dr. Doa will no longer be be able to see him in the coming weeks.  He was states also seen in the [facility] emergency department on April 1st Friday but there is no encounter in epic that matches this date.    He has had recent history of increased Ostomy output, states normal for him would be emptying the ostomy every 2 hours, he even wakes up [id number] to empty ostomy.  When he has diarrhea, describes having liquid output "looks like pea soup" which may require emptying ostomy every 3-4 times per hour when it occurs.  He was seen by surgeon and instructed to take Fiber pills, he is taking 3 Fiber pills in the " evening but doesn't note significant improvement.  He has been prescribed Lo-Motil before, states its not very effective, he does take Liquid Immodium (loperamide) when he has high output from ostomy.     Seen in nephrology clinic today and noted to have Cr of 3.0 on labs, up from baseline level (1.3-1.5).  he denies any NSAID use, no other new medications.  On interview today c/o of some light headed and blurred vision feeling - as he is diabetic, took AM meds including Glimeperide but has eaten very little today.   He denies any acute abdominal pain or distension today    ED workup included CT abdomen demonstrates - no concern for acute obstruction - improved/resolution of gaseous distension noted on recent abdominal imaging studies.     ED Treatment - 1 Liter LR       Past Medical History:   Diagnosis Date    Basal cell carcinoma     BPH (benign prostatic hyperplasia)     s/p TURP    Carotid stenosis     Chronic kidney disease     Claudication     Coronary artery disease     DDD (degenerative disc disease) 10/21/2013    Diabetes mellitus with renal complications     Disc disease, degenerative, cervical     Encounter for blood transfusion     GERD (gastroesophageal reflux disease)     Gout, chronic     History of ulcerative colitis     s/p colectomy and ileostomy    HLD (hyperlipidemia)     HTN (hypertension)     Ileostomy in place 1982    RBBB     Squamous cell carcinoma 03/08/2018    Left superior helix near insertion    Squamous cell carcinoma 04/12/2018    Left forearm x 5    Ventricular tachycardia        Past Surgical History:   Procedure Laterality Date    cardiac stents      CATARACT EXTRACTION Bilateral     CERVICAL FUSION      colectomy and ileostomy  1985    EXCISION OF PAROTID GLAND Left 12/18/2020    Procedure: EXCISION, PAROTID GLAND;  Surgeon: Michael Pinzon MD;  Location: [facility] OR [address] [place] [address];  Service: ENT;  Laterality: Left;    INSERTION OF IMPLANTABLE LOOP RECORDER   06/07/2021    INSERTION OF IMPLANTABLE LOOP RECORDER Left 6/7/2021    Procedure: INSERTION, IMPLANTABLE LOOP RECORDER;  Surgeon: Romario Dao MD;  Location: [facility] CATH LAB;  Service: Cardiology;  Laterality: Left;    LEFT HEART CATHETERIZATION Right 4/15/2021    Procedure: CATHETERIZATION, HEART, LEFT;  Surgeon: Marcio Jones MD;  Location: [facility] CATH LAB;  Service: Cardiology;  Laterality: Right;    LYSIS OF ADHESIONS N/A 11/9/2020    Procedure: LYSIS, ADHESIONS,  ERAS low;  Surgeon: CED Haley MD;  Location: [facility] OR 2ND FLR;  Service: Colon and Rectal;  Laterality: N/A;    REPAIR, HERNIA, PARASTOMAL N/A 11/9/2020    Procedure: REPAIR, HERNIA, PARASTOMAL;  Surgeon: CED Haley MD;  Location: [facility] OR 2ND FLR;  Service: Colon and Rectal;  Laterality: N/A;    TRANSURETHRAL RESECTION OF PROSTATE (TURP) WITHOUT USE OF LASER N/A 1/23/2019    Procedure: TURP, WITHOUT USING LASER BIPOLAR;  Surgeon: Catarino Mota MD;  Location: [facility] OR 1ST FLR;  Service: Urology;  Laterality: N/A;  1.5 HOURS       Review of patient's allergies indicates:   Allergen Reactions    Crestor [rosuvastatin]     Lipitor [atorvastatin]        Current Facility-Administered Medications on File Prior to Encounter   Medication    sodium chloride 0.9% in 1,000 mL infusion     Current Outpatient Medications on File Prior to Encounter   Medication Sig    allopurinoL (ZYLOPRIM) 300 MG tablet Take 1.5 tablets (450 mg total) by mouth once daily. (Patient taking differently: Take 450 mg by mouth every evening.)    ALPRAZolam (XANAX) 0.5 MG tablet Take 0.5 mg by mouth nightly as needed for Anxiety.    amiodarone (PACERONE) 200 MG Tab Take 1 tablet (200 mg total) by mouth 2 (two) times daily.    aspirin (ECOTRIN) 81 MG EC tablet Take 81 mg by mouth once daily.    brimonidine 0.2% (ALPHAGAN) 0.2 % Drop INSTILL 1 DROP INTO EACH EYE TWICE DAILY    carvediloL (COREG) 12.5 MG tablet Take 12.5 mg by mouth 2 (two) times daily with  meals.    clopidogrel (PLAVIX) 75 mg tablet Take 75 mg by mouth once daily.    diphenoxylate-atropine 2.5-0.025 mg (LOMOTIL) 2.5-0.025 mg per tablet Take 1 tablet by mouth 4 (four) times daily.    FIBER, CALCIUM POLYCARBOPHIL, 625 mg tablet Take 3 tablets by mouth before dinner.    gabapentin (NEURONTIN) 600 MG tablet Take 1 tablet (600 mg total) by mouth 2 (two) times daily.    glimepiride (AMARYL) 4 MG tablet Take 4 mg by mouth 2 (two) times daily before meals.    HYDROcodone-acetaminophen (NORCO) 5-325 mg per tablet Take 1 tablet by mouth every 12 (twelve) hours as needed for Pain.    magnesium oxide (MAG-OX) 400 mg (241.3 mg magnesium) tablet Take 400 mg by mouth once daily.    pantoprazole (PROTONIX) 40 MG tablet Take 40 mg by mouth once daily.    pravastatin (PRAVACHOL) 40 MG tablet Take 1 tablet (40 mg total) by mouth once daily. (Patient taking differently: Take 80 mg by mouth once daily.)    ACCU-CHEK PAUL CONTROL SOLN Soln 1 drop by NOT APPLICABLE route once daily.    ACCU-CHEK SOFTCLIX LANCETS Misc 1 lancet by NOT APPLICABLE route once daily.    amLODIPine (NORVASC) 5 MG tablet Take 5 mg by mouth once daily.    BD ALCOHOL SWABS Pad     blood sugar diagnostic (ONE TOUCH ULTRA TEST) Str USE ONE STRIP TO CHECK GLUCOSE EVERY DAY    calcium citrate-vitamin D3 (CITRACAL + D MAXIMUM) 315 mg-6.25 mcg (250 unit) Tab Take 1 tablet by mouth once daily.    droxidopa 100 mg Cap Take 2 capsules by mouth 2 (two) times a day. For Low blood pressure    fludrocortisone (FLORINEF) 0.1 mg Tab     [DISCONTINUED] droxidopa 100 mg Cap Take 100 mg by mouth 3 (three) times daily.    [DISCONTINUED] guaiFENesin-codeine 100-10 mg/5 ml (TUSSI-ORGANIDIN NR)  mg/5 mL syrup Take 5 mLs by mouth every 6 (six) hours as needed for Cough.    [DISCONTINUED] mupirocin (BACTROBAN) 2 % ointment Apply topically 2 (two) times daily.     Family History       Problem Relation (Age of Onset)    Cancer Father    Dementia  Mother    Diabetes Father          Tobacco Use    Smoking status: Never Smoker    Smokeless tobacco: Never Used   Substance and Sexual Activity    Alcohol use: No    Drug use: No    Sexual activity: Not Currently     Partners: Female     Review of Systems   Constitutional:  Negative for activity change, appetite change, chills, fatigue and fever.   HENT:  Negative for sore throat and trouble swallowing.    Respiratory:  Negative for cough and shortness of breath.    Cardiovascular:  Negative for chest pain and leg swelling.   Gastrointestinal:  Negative for abdominal pain, nausea and vomiting.   Genitourinary:  Negative for dysuria.   Musculoskeletal:  Negative for back pain.   Skin:  Negative for rash.   Neurological:  Positive for light-headedness. Negative for weakness.   Psychiatric/Behavioral:  Negative for confusion.    Objective:     Vital Signs (Most Recent):  Temp: 97.8 °F (36.6 °C) (04/04/22 2143)  Pulse: 82 (04/04/22 2143)  Resp: 16 (04/04/22 2143)  BP: (!) 178/98 (04/04/22 2143)  SpO2: 98 % (04/04/22 2143)   Vital Signs (24h Range):  Temp:  [97.8 °F (36.6 °C)-98.2 °F (36.8 °C)] 97.8 °F (36.6 °C)  Pulse:  [70-82] 82  Resp:  [16-18] 16  SpO2:  [98 %-99 %] 98 %  BP: (112-178)/(70-98) 178/98     Weight: 94.2 kg (207 lb 10.8 oz)  Body mass index is 26.66 kg/m².    Physical Exam  Vitals and nursing note reviewed.   Constitutional:       General: He is not in acute distress.     Appearance: He is not toxic-appearing.   HENT:      Head: Normocephalic and atraumatic.      Mouth/Throat:      Mouth: Mucous membranes are moist.      Comments: Poor dentition, missing multiple teeth  Eyes:      General: No scleral icterus.     Conjunctiva/sclera: Conjunctivae normal.      Pupils: Pupils are equal, round, and reactive to light.   Cardiovascular:      Rate and Rhythm: Normal rate and regular rhythm.      Pulses: Normal pulses.   Pulmonary:      Effort: Pulmonary effort is normal. No respiratory distress.       Breath sounds: Normal breath sounds. No wheezing or rales.   Abdominal:      General: Abdomen is flat. The ostomy site is clean.      Tenderness: There is no abdominal tenderness.      Comments: Ostomy bag with very small amount of brown liquid stool, in the RLQ   Musculoskeletal:      Cervical back: Neck supple.   Neurological:      Mental Status: He is alert.         CRANIAL NERVES     CN III, IV, VI   Pupils are equal, round, and reactive to light.     Significant Labs: All pertinent labs within the past 24 hours have been reviewed.  CMP:   Recent Labs   Lab 04/04/22  0911 04/04/22  1653    140   K 4.7 3.4*    104   CO2 29 25   * 83   BUN 43* 42*   CREATININE 3.0* 2.5*   CALCIUM 9.6 8.6*   PROT  --  6.3   ALBUMIN  --  3.1*   BILITOT  --  0.5   ALKPHOS  --  114   AST  --  34   ALT  --  32   ANIONGAP 11 11   EGFRNONAA 19.7* 24.6*     Cardiac Markers: No results for input(s): CKMB, MYOGLOBIN, BNP, TROPISTAT in the last 48 hours.  Lactic Acid:   Recent Labs   Lab 04/04/22  1653   LACTATE 1.7       Significant Imaging: I have reviewed all pertinent imaging results/findings within the past 24 hours.    CT ABDOMEN PELVIS WITHOUT CONTRAST     CLINICAL HISTORY:  Abdominal pain, acute, nonlocalized;     TECHNIQUE:  Low dose axial images, sagittal and coronal reformations were obtained from the lung bases to the pubic symphysis.  Oral contrast was not administered.     COMPARISON:  CT 09/10/2021     FINDINGS:  Images of the lower thorax are remarkable for bilateral dependent atelectasis/scarring.  There is left pleural thickening, similar to the previous examination.  There is calcification in the distribution of the coronary arteries.     The liver, spleen and adrenal glands have a grossly unremarkable noncontrast appearance.  There is atrophic change of the pancreas without pancreatic ductal dilation.  The gallbladder is unremarkable.  No significant biliary dilation.  The stomach is decompressed  without wall thickening.  Surgical changes are noted of the anterior aspect of the abdomen.     There is no hydronephrosis or nephrolithiasis.  Several low attenuating lesions arise from the left kidney, the majority of which are too small for characterization.  Some of which measure attenuation suggesting cysts, others measure higher attenuation than would be expected for simple cysts.  The bilateral ureters are unable to be followed in their entirety to the urinary bladder, no definite calculi seen.  The urinary bladder is decompressed.  The prostate is not enlarged.     Surgical changes are noted of colectomy.  There is end ileostomy.  The small bowel is grossly unremarkable noting a few small bowel loops are closely tethered to the anterior abdominal wall without obstruction.  There is slight induration within the anterior aspect of the mesentery, in comparison to the previous examination, this has decreased since that time.  Additionally, the degree of bowel distension has also decreased since previous exam noting essentially decompressed small bowel on current exam.  No focal organized fluid collection.  Several scattered shotty periaortic and paracaval lymph nodes are noted.     There are bilateral fat containing inguinal hernias, left greater than right.  There is osteopenia.  Degenerative changes are noted of the bilateral sacroiliac joints, pubic symphysis, and spine.  No significant inguinal lymphadenopathy.     Impression:     1. Surgical changes of colectomy with end ileostomy.  No findings to suggest small bowel obstruction.  There is been interval improvement of mesenteric edema anteriorly.  No focal organized fluid collection.  2. Several left renal low attenuating lesions, nonemergent ultrasound could be performed to confirm cystic nature as warranted.  3. Please see above for several additional findings.          Assessment/Plan:     * GRADY (acute kidney injury)  -patient with presumed Pre-renal GRADY  secondary to hypovolemia, pt with recent hx of high output from Ostomy, receiving every other week outpatient IV fluids via cardiology clinic,   -no bleeding from ostomy, no nausea/vomiting and abdominal pain - CT scan shows improved bowel distension present on recent Upper GI with small bowel follow through.   -Today patient has had no Ostomy output since this morning   -s/p 1L of LR.  Continue IV fluids overnight - 200cc/hr LR x 10 hours.   -Trend metabolic panels.  Consult Nephrology if not improving   -No overt metablic acidosis that might be seen with High output ostomy  -Continue PRN Loperamide for patient - reports superior to   -Continue Psyllium   -GI consult to evaluate if further medical therapy to modify attenuate ostomy outupt.     -Reports his cardiologist (Dr. Dao) will likely be unable to continue administering outpatient IV fludis - At discharge discuss with patient if he may require d/c with a PICC line and Home health orders for intermittent IV fluid administration. Involve Dr. Poli Gonzalez, pts PCP accordingly on this potential plan.     Serum creatinine: 2.5 mg/dL (H) 04/04/22 1653  Estimated creatinine clearance: 30.6 mL/min (A)        High output ileostomy  As per GRADY  -CT scan rules out obstruction.       Type 2 diabetes mellitus with diabetic polyneuropathy, without long-term current use of insulin  continu Gabapentin but renal dosing at this time      CAD (coronary artery disease)  continu       Idiopathic chronic gout of multiple sites with tophus  Continue chronic allopurinol - may have to renally adjust dose.       Essential hypertension  Continue home medications as appropriate  Pt is hypertensive in the ED    Type 2 diabetes mellitus with stage 3 chronic kidney disease, without long-term current use of insulin  - on amaryl BID at home  -low dose insulin sliding scale and accucheck monitoring while inpatient.   Diabetic diet        VTE Risk Mitigation (From admission, onward)          Ordered     IP VTE HIGH RISK PATIENT  Once         04/04/22 1902     Place sequential compression device  Until discontinued         04/04/22 1902                   Gerber Payne MD  Department of Hospital Medicine   [facility] - Telemetry Stepdown ([place]-[facility])

## 2022-04-05 NOTE — SUBJECTIVE & OBJECTIVE
Past Medical History:   Diagnosis Date    Basal cell carcinoma     BPH (benign prostatic hyperplasia)     s/p TURP    Carotid stenosis     Chronic kidney disease     Claudication     Coronary artery disease     DDD (degenerative disc disease) 10/21/2013    Diabetes mellitus with renal complications     Disc disease, degenerative, cervical     Encounter for blood transfusion     GERD (gastroesophageal reflux disease)     Gout, chronic     History of ulcerative colitis     s/p colectomy and ileostomy    HLD (hyperlipidemia)     HTN (hypertension)     Ileostomy in place 1982    RBBB     Squamous cell carcinoma 03/08/2018    Left superior helix near insertion    Squamous cell carcinoma 04/12/2018    Left forearm x 5    Ventricular tachycardia        Past Surgical History:   Procedure Laterality Date    cardiac stents      CATARACT EXTRACTION Bilateral     CERVICAL FUSION      colectomy and ileostomy  1985    EXCISION OF PAROTID GLAND Left 12/18/2020    Procedure: EXCISION, PAROTID GLAND;  Surgeon: Michael Pinzon MD;  Location: [facility] OR [address] [place] [address];  Service: ENT;  Laterality: Left;    INSERTION OF IMPLANTABLE LOOP RECORDER  06/07/2021    INSERTION OF IMPLANTABLE LOOP RECORDER Left 6/7/2021    Procedure: INSERTION, IMPLANTABLE LOOP RECORDER;  Surgeon: Romario Dao MD;  Location: [facility] CATH LAB;  Service: Cardiology;  Laterality: Left;    LEFT HEART CATHETERIZATION Right 4/15/2021    Procedure: CATHETERIZATION, HEART, LEFT;  Surgeon: Marcio Jones MD;  Location: [facility] CATH LAB;  Service: Cardiology;  Laterality: Right;    LYSIS OF ADHESIONS N/A 11/9/2020    Procedure: LYSIS, ADHESIONS,  ERAS low;  Surgeon: CED Haley MD;  Location: [facility] OR [address] [place] [address];  Service: Colon and Rectal;  Laterality: N/A;    REPAIR, HERNIA, PARASTOMAL N/A 11/9/2020    Procedure: REPAIR, HERNIA, PARASTOMAL;  Surgeon: CED Haley MD;  Location: [facility] OR [address] [place] [address];  Service: Colon and Rectal;  Laterality: N/A;    TRANSURETHRAL  RESECTION OF PROSTATE (TURP) WITHOUT USE OF LASER N/A 1/23/2019    Procedure: TURP, WITHOUT USING LASER BIPOLAR;  Surgeon: Catarino Mota MD;  Location: [facility] OR [address] [place] [address];  Service: Urology;  Laterality: N/A;  1.5 HOURS       Review of patient's allergies indicates:   Allergen Reactions    Crestor [rosuvastatin]     Lipitor [atorvastatin]        Current Facility-Administered Medications on File Prior to Encounter   Medication    sodium chloride 0.9% in 1,000 mL infusion     Current Outpatient Medications on File Prior to Encounter   Medication Sig    allopurinoL (ZYLOPRIM) 300 MG tablet Take 1.5 tablets (450 mg total) by mouth once daily. (Patient taking differently: Take 450 mg by mouth every evening.)    ALPRAZolam (XANAX) 0.5 MG tablet Take 0.5 mg by mouth nightly as needed for Anxiety.    amiodarone (PACERONE) 200 MG Tab Take 1 tablet (200 mg total) by mouth 2 (two) times daily.    aspirin (ECOTRIN) 81 MG EC tablet Take 81 mg by mouth once daily.    brimonidine 0.2% (ALPHAGAN) 0.2 % Drop INSTILL 1 DROP INTO EACH EYE TWICE DAILY    carvediloL (COREG) 12.5 MG tablet Take 12.5 mg by mouth 2 (two) times daily with meals.    clopidogrel (PLAVIX) 75 mg tablet Take 75 mg by mouth once daily.    diphenoxylate-atropine 2.5-0.025 mg (LOMOTIL) 2.5-0.025 mg per tablet Take 1 tablet by mouth 4 (four) times daily.    FIBER, CALCIUM POLYCARBOPHIL, 625 mg tablet Take 3 tablets by mouth before dinner.    gabapentin (NEURONTIN) 600 MG tablet Take 1 tablet (600 mg total) by mouth 2 (two) times daily.    glimepiride (AMARYL) 4 MG tablet Take 4 mg by mouth 2 (two) times daily before meals.    HYDROcodone-acetaminophen (NORCO) 5-325 mg per tablet Take 1 tablet by mouth every 12 (twelve) hours as needed for Pain.    magnesium oxide (MAG-OX) 400 mg (241.3 mg magnesium) tablet Take 400 mg by mouth once daily.    pantoprazole (PROTONIX) 40 MG tablet Take 40 mg by mouth once daily.    pravastatin (PRAVACHOL) 40 MG tablet Take 1 tablet (40  mg total) by mouth once daily. (Patient taking differently: Take 80 mg by mouth once daily.)    ACCU-CHEK PAUL CONTROL SOLN Soln 1 drop by NOT APPLICABLE route once daily.    ACCU-CHEK SOFTCLIX LANCETS Misc 1 lancet by NOT APPLICABLE route once daily.    amLODIPine (NORVASC) 5 MG tablet Take 5 mg by mouth once daily.    BD ALCOHOL SWABS PadM     blood sugar diagnostic (ONE TOUCH ULTRA TEST) Strp USE ONE STRIP TO CHECK GLUCOSE EVERY DAY    calcium citrate-vitamin D3 (CITRACAL + D MAXIMUM) 315 mg-6.25 mcg (250 unit) Tab Take 1 tablet by mouth once daily.    droxidopa 100 mg Cap Take 2 capsules by mouth 2 (two) times a day. For Low blood pressure    fludrocortisone (FLORINEF) 0.1 mg Tab     [DISCONTINUED] droxidopa 100 mg Cap Take 100 mg by mouth 3 (three) times daily.    [DISCONTINUED] guaiFENesin-codeine 100-10 mg/5 ml (TUSSI-ORGANIDIN NR)  mg/5 mL syrup Take 5 mLs by mouth every 6 (six) hours as needed for Cough.    [DISCONTINUED] mupirocin (BACTROBAN) 2 % ointment Apply topically 2 (two) times daily.     Family History       Problem Relation (Age of Onset)    Cancer Father    Dementia Mother    Diabetes Father          Tobacco Use    Smoking status: Never Smoker    Smokeless tobacco: Never Used   Substance and Sexual Activity    Alcohol use: No    Drug use: No    Sexual activity: Not Currently     Partners: Female     Review of Systems   Constitutional:  Negative for activity change, appetite change, chills, fatigue and fever.   HENT:  Negative for sore throat and trouble swallowing.    Respiratory:  Negative for cough and shortness of breath.    Cardiovascular:  Negative for chest pain and leg swelling.   Gastrointestinal:  Negative for abdominal pain, nausea and vomiting.   Genitourinary:  Negative for dysuria.   Musculoskeletal:  Negative for back pain.   Skin:  Negative for rash.   Neurological:  Positive for light-headedness. Negative for weakness.   Psychiatric/Behavioral:  Negative for confusion.     Objective:     Vital Signs (Most Recent):  Temp: 97.8 °F (36.6 °C) (04/04/22 2143)  Pulse: 82 (04/04/22 2143)  Resp: 16 (04/04/22 2143)  BP: (!) 178/98 (04/04/22 2143)  SpO2: 98 % (04/04/22 2143)   Vital Signs (24h Range):  Temp:  [97.8 °F (36.6 °C)-98.2 °F (36.8 °C)] 97.8 °F (36.6 °C)  Pulse:  [70-82] 82  Resp:  [16-18] 16  SpO2:  [98 %-99 %] 98 %  BP: (112-178)/(70-98) 178/98     Weight: 94.2 kg (207 lb 10.8 oz)  Body mass index is 26.66 kg/m².    Physical Exam  Vitals and nursing note reviewed.   Constitutional:       General: He is not in acute distress.     Appearance: He is not toxic-appearing.   HENT:      Head: Normocephalic and atraumatic.      Mouth/Throat:      Mouth: Mucous membranes are moist.      Comments: Poor dentition, missing multiple teeth  Eyes:      General: No scleral icterus.     Conjunctiva/sclera: Conjunctivae normal.      Pupils: Pupils are equal, round, and reactive to light.   Cardiovascular:      Rate and Rhythm: Normal rate and regular rhythm.      Pulses: Normal pulses.   Pulmonary:      Effort: Pulmonary effort is normal. No respiratory distress.      Breath sounds: Normal breath sounds. No wheezing or rales.   Abdominal:      General: Abdomen is flat. The ostomy site is clean.      Tenderness: There is no abdominal tenderness.      Comments: Ostomy bag with very small amount of brown liquid stool, in the RLQ   Musculoskeletal:      Cervical back: Neck supple.   Neurological:      Mental Status: He is alert.         CRANIAL NERVES     CN III, IV, VI   Pupils are equal, round, and reactive to light.     Significant Labs: All pertinent labs within the past 24 hours have been reviewed.  CMP:   Recent Labs   Lab 04/04/22  0911 04/04/22  1653    140   K 4.7 3.4*    104   CO2 29 25   * 83   BUN 43* 42*   CREATININE 3.0* 2.5*   CALCIUM 9.6 8.6*   PROT  --  6.3   ALBUMIN  --  3.1*   BILITOT  --  0.5   ALKPHOS  --  114   AST  --  34   ALT  --  32   ANIONGAP 11 11    EGFRNONAA 19.7* 24.6*     Cardiac Markers: No results for input(s): CKMB, MYOGLOBIN, BNP, TROPISTAT in the last 48 hours.  Lactic Acid:   Recent Labs   Lab 04/04/22  1653   LACTATE 1.7       Significant Imaging: I have reviewed all pertinent imaging results/findings within the past 24 hours.    CT ABDOMEN PELVIS WITHOUT CONTRAST     CLINICAL HISTORY:  Abdominal pain, acute, nonlocalized;     TECHNIQUE:  Low dose axial images, sagittal and coronal reformations were obtained from the lung bases to the pubic symphysis.  Oral contrast was not administered.     COMPARISON:  CT 09/10/2021     FINDINGS:  Images of the lower thorax are remarkable for bilateral dependent atelectasis/scarring.  There is left pleural thickening, similar to the previous examination.  There is calcification in the distribution of the coronary arteries.     The liver, spleen and adrenal glands have a grossly unremarkable noncontrast appearance.  There is atrophic change of the pancreas without pancreatic ductal dilation.  The gallbladder is unremarkable.  No significant biliary dilation.  The stomach is decompressed without wall thickening.  Surgical changes are noted of the anterior aspect of the abdomen.     There is no hydronephrosis or nephrolithiasis.  Several low attenuating lesions arise from the left kidney, the majority of which are too small for characterization.  Some of which measure attenuation suggesting cysts, others measure higher attenuation than would be expected for simple cysts.  The bilateral ureters are unable to be followed in their entirety to the urinary bladder, no definite calculi seen.  The urinary bladder is decompressed.  The prostate is not enlarged.     Surgical changes are noted of colectomy.  There is end ileostomy.  The small bowel is grossly unremarkable noting a few small bowel loops are closely tethered to the anterior abdominal wall without obstruction.  There is slight induration within the anterior  aspect of the mesentery, in comparison to the previous examination, this has decreased since that time.  Additionally, the degree of bowel distension has also decreased since previous exam noting essentially decompressed small bowel on current exam.  No focal organized fluid collection.  Several scattered shotty periaortic and paracaval lymph nodes are noted.     There are bilateral fat containing inguinal hernias, left greater than right.  There is osteopenia.  Degenerative changes are noted of the bilateral sacroiliac joints, pubic symphysis, and spine.  No significant inguinal lymphadenopathy.     Impression:     1. Surgical changes of colectomy with end ileostomy.  No findings to suggest small bowel obstruction.  There is been interval improvement of mesenteric edema anteriorly.  No focal organized fluid collection.  2. Several left renal low attenuating lesions, nonemergent ultrasound could be performed to confirm cystic nature as warranted.  3. Please see above for several additional findings.

## 2022-04-05 NOTE — ASSESSMENT & PLAN NOTE
-patient with presumed Pre-renal GRADY secondary to hypovolemia, pt with recent hx of high output from Ostomy, receiving every other week outpatient IV fluids via cardiology clinic,   -no bleeding from ostomy, no nausea/vomiting and abdominal pain - CT scan shows improved bowel distension present on recent Upper GI with small bowel follow through.   -Today patient has had no Ostomy output since this morning   -s/p 1L of LR.  Continue IV fluids overnight - 200cc/hr LR x 10 hours.   -Trend metabolic panels.  Consult Nephrology if not improving   -No overt metablic acidosis that might be seen with High output ostomy  -Continue PRN Loperamide for patient - reports superior to   -Continue Psyllium   -GI consult to evaluate if further medical therapy to modify attenuate ostomy outupt.     -Reports his cardiologist (Dr. Dao) will likely be unable to continue administering outpatient IV fludis - At discharge discuss with patient if he may require d/c with a PICC line and Home health orders for intermittent IV fluid administration. Involve Dr. Poli Gonzalez, pts PCP accordingly on this potential plan.     Serum creatinine: 2.5 mg/dL (H) 04/04/22 1653  Estimated creatinine clearance: 30.6 mL/min (A)

## 2022-04-05 NOTE — HOSPITAL COURSE
Patient admitted to observation for acute kidney injury. Cr 2.5 --> 2.1 after receiving LR infusion overnight. GI consulted and recommend stool studies, continuous fluids, and ileoscopy. Ileoscopy without acute findings and diarrhea resolved with psyllium. Stool studies negative. Cr 1.7 after receiving LR infusion x2 nights. Stable for discharge with GI, PCP, and nephrology follow-up. Return precautions given and patient verbalized understanding.

## 2022-04-05 NOTE — HPI
72 M w/ PMH BPH, CAD, GERD, HTN, HLD, non-melanoma skin cancer, ulcerative colitis s/p colectomy and end ileostomy admitted with lab findings of GRAYD on CKD.  Patient was previously seen by GI and CRS in outpatient setting for evaluation and management of ostomy and ostomy output.  He has had multiple intraabdominal surgeries, most recently a parastomal hernia repair in 2020.  Patient reports many years of waxing and waning increasing output from his ostomy.  He reports he usually controls his symptoms with increasing fiber supplementation.  At most recent GI visit he was treated with rifaximin and viberzi but he reports these were expensive medications and did not help.  Also reports he has used PRN lomitil in the past, has also used imodium.      Of note, patient was recently treated at [facility] for pSBO requiring NGT placement in March 2022.  At that time, he was treated conservatively.  SBFT performed 3/16 showing resolution of any bowel obstruction and contrast reaching ostomy.      On admission, patient was HDS and afebrile.  Labs notable for Hgb 11.4, Cr 3.0-->2.1 after IVF, albumin 3.0, unremarkable CMP otherwise.  Non-contrasted CT without evidence of SBO.

## 2022-04-05 NOTE — SUBJECTIVE & OBJECTIVE
Past Medical History:   Diagnosis Date    Basal cell carcinoma     BPH (benign prostatic hyperplasia)     s/p TURP    Carotid stenosis     Chronic kidney disease     Claudication     Coronary artery disease     DDD (degenerative disc disease) 10/21/2013    Diabetes mellitus with renal complications     Disc disease, degenerative, cervical     Encounter for blood transfusion     GERD (gastroesophageal reflux disease)     Gout, chronic     History of ulcerative colitis     s/p colectomy and ileostomy    HLD (hyperlipidemia)     HTN (hypertension)     Ileostomy in place 1982    RBBB     Squamous cell carcinoma 03/08/2018    Left superior helix near insertion    Squamous cell carcinoma 04/12/2018    Left forearm x 5    Ventricular tachycardia        Past Surgical History:   Procedure Laterality Date    cardiac stents      CATARACT EXTRACTION Bilateral     CERVICAL FUSION      colectomy and ileostomy  1985    EXCISION OF PAROTID GLAND Left 12/18/2020    Procedure: EXCISION, PAROTID GLAND;  Surgeon: Michael Pinzon MD;  Location: [facility] OR [address] [place] [address];  Service: ENT;  Laterality: Left;    INSERTION OF IMPLANTABLE LOOP RECORDER  06/07/2021    INSERTION OF IMPLANTABLE LOOP RECORDER Left 6/7/2021    Procedure: INSERTION, IMPLANTABLE LOOP RECORDER;  Surgeon: Romario Dao MD;  Location: [facility] CATH LAB;  Service: Cardiology;  Laterality: Left;    LEFT HEART CATHETERIZATION Right 4/15/2021    Procedure: CATHETERIZATION, HEART, LEFT;  Surgeon: Marcio Jones MD;  Location: [facility] CATH LAB;  Service: Cardiology;  Laterality: Right;    LYSIS OF ADHESIONS N/A 11/9/2020    Procedure: LYSIS, ADHESIONS,  ERAS low;  Surgeon: CED Haley MD;  Location: [facility] OR [address] [place] [address];  Service: Colon and Rectal;  Laterality: N/A;    REPAIR, HERNIA, PARASTOMAL N/A 11/9/2020    Procedure: REPAIR, HERNIA, PARASTOMAL;  Surgeon: CED Haley MD;  Location: [facility] OR [address] [place] [address];  Service: Colon and Rectal;  Laterality: N/A;    TRANSURETHRAL  RESECTION OF PROSTATE (TURP) WITHOUT USE OF LASER N/A 1/23/2019    Procedure: TURP, WITHOUT USING LASER BIPOLAR;  Surgeon: Catarino Mota MD;  Location: [facility] OR [address] [place] [address];  Service: Urology;  Laterality: N/A;  1.5 HOURS       Review of patient's allergies indicates:   Allergen Reactions    Crestor [rosuvastatin]     Lipitor [atorvastatin]      Family History       Problem Relation (Age of Onset)    Cancer Father    Dementia Mother    Diabetes Father          Tobacco Use    Smoking status: Never Smoker    Smokeless tobacco: Never Used   Substance and Sexual Activity    Alcohol use: No    Drug use: No    Sexual activity: Not Currently     Partners: Female     Review of Systems   Constitutional:  Positive for appetite change and fatigue.   HENT: Negative.     Eyes: Negative.    Respiratory: Negative.     Cardiovascular: Negative.    Gastrointestinal:  Positive for diarrhea. Negative for abdominal distention and abdominal pain.   Endocrine: Negative.    Genitourinary: Negative.    Musculoskeletal: Negative.    Skin: Negative.    Allergic/Immunologic: Negative.    Neurological: Negative.    Hematological: Negative.    Psychiatric/Behavioral: Negative.     Objective:     Vital Signs (Most Recent):  Temp: 97.6 °F (36.4 °C) (04/05/22 0716)  Pulse: 66 (04/05/22 0902)  Resp: 17 (04/05/22 0716)  BP: (!) 174/92 (04/05/22 0902)  SpO2: 95 % (04/05/22 0716)   Vital Signs (24h Range):  Temp:  [97.5 °F (36.4 °C)-98.2 °F (36.8 °C)] 97.6 °F (36.4 °C)  Pulse:  [64-82] 66  Resp:  [16-20] 17  SpO2:  [95 %-99 %] 95 %  BP: (112-178)/(70-98) 174/92     Weight: 94.2 kg (207 lb 10.8 oz) (04/04/22 2143)  Body mass index is 26.66 kg/m².      Intake/Output Summary (Last 24 hours) at 4/5/2022 0904  Last data filed at 4/5/2022 0100  Gross per 24 hour   Intake 1240 ml   Output 700 ml   Net 540 ml       Lines/Drains/Airways       Drain  Duration                  Ileostomy 01/07/19 1101 RLQ 1183 days         Closed/Suction Drain 12/18/20 0938 Left Neck  Bulb 15 Fr. 472 days              Peripheral Intravenous Line  Duration                  Peripheral IV - Single Lumen 04/04/22 1545 20 G Right Antecubital <1 day                    Physical Exam    Gen: NAD, lying comfortably  HENT: NCAT, oropharynx clear  Eyes: anicteric sclerae, EOMI grossly  Neck: supple, no visible masses/goiter  Cardiac: RRR, no M/R/G, S1/S2 present  Lungs: CTAB, no crackles, no wheezes  Abd: soft, NT/ND, normoactive BS, ileostomy in place, retracted midline surgical incision   Ext: no LE edema, warm, well perfused  Skin: skin intact on exposed body parts, no visible rashes, lesions  Neuro: A&Ox4, neuro exam grossly intact, moves all extremities  Psych: appropriate mood, affect      Significant Labs:  CBC:   Recent Labs   Lab 04/04/22  1653 04/05/22  0413   WBC 5.73 5.82   HGB 11.4* 11.3*   HCT 34.6* 35.8*   * 149*     CMP:   Recent Labs   Lab 04/05/22  0413   GLU 74   CALCIUM 8.8   ALBUMIN 3.0*   PROT 6.1      K 3.5   CO2 28      BUN 35*   CREATININE 2.1*   ALKPHOS 107   ALT 28   AST 29   BILITOT 0.6     Coagulation: No results for input(s): PT, INR, APTT in the last 48 hours.    Significant Imaging:  Imaging results within the past 24 hours have been reviewed.

## 2022-04-05 NOTE — SUBJECTIVE & OBJECTIVE
Interval History: Patient seen and examined today. NAVEEN MELGAR. Cr improved to 2.1 after continuous LR overnight. Patient reports a small amount of semi-formed stool in the ileostomy overnight. GI consulted and recommend stool studies, continue LR infusion, and plan for ileoscopy in the morning. NPO at midnight.    Review of Systems   Constitutional:  Positive for appetite change and fatigue.   HENT: Negative.     Eyes: Negative.    Respiratory: Negative.     Cardiovascular: Negative.    Gastrointestinal:  Positive for diarrhea. Negative for abdominal distention and abdominal pain.   Endocrine: Negative.    Genitourinary: Negative.    Musculoskeletal: Negative.    Skin: Negative.    Allergic/Immunologic: Negative.    Neurological: Negative.    Hematological: Negative.    Psychiatric/Behavioral: Negative.     Objective:     Vital Signs (Most Recent):  Temp: 97.9 °F (36.6 °C) (04/05/22 1620)  Pulse: 61 (04/05/22 1620)  Resp: 17 (04/05/22 1620)  BP: (!) 161/86 (04/05/22 1620)  SpO2: 98 % (04/05/22 1620)   Vital Signs (24h Range):  Temp:  [97.5 °F (36.4 °C)-97.9 °F (36.6 °C)] 97.9 °F (36.6 °C)  Pulse:  [60-82] 61  Resp:  [16-20] 17  SpO2:  [95 %-99 %] 98 %  BP: (113-178)/(71-98) 161/86     Weight: 94.2 kg (207 lb 10.8 oz)  Body mass index is 26.66 kg/m².    Intake/Output Summary (Last 24 hours) at 4/5/2022 1621  Last data filed at 4/5/2022 0100  Gross per 24 hour   Intake 1240 ml   Output 700 ml   Net 540 ml      Physical Exam  Vitals and nursing note reviewed.   Constitutional:       General: He is not in acute distress.     Appearance: He is not toxic-appearing.   HENT:      Head: Normocephalic and atraumatic.      Mouth/Throat:      Mouth: Mucous membranes are moist.      Comments: Poor dentition, missing multiple teeth  Eyes:      General: No scleral icterus.     Conjunctiva/sclera: Conjunctivae normal.      Pupils: Pupils are equal, round, and reactive to light.   Cardiovascular:      Rate and Rhythm: Normal rate  and regular rhythm.      Pulses: Normal pulses.   Pulmonary:      Effort: Pulmonary effort is normal. No respiratory distress.      Breath sounds: Normal breath sounds. No wheezing or rales.   Abdominal:      General: Abdomen is flat. The ostomy site is clean.      Tenderness: There is no abdominal tenderness.      Comments: Ostomy bag with very small amount of brown liquid stool, in the RLQ   Musculoskeletal:      Cervical back: Neck supple.   Neurological:      Mental Status: He is alert.       Significant Labs: All pertinent labs within the past 24 hours have been reviewed.  BMP:   Recent Labs   Lab 04/05/22  0413   GLU 74      K 3.5      CO2 28   BUN 35*   CREATININE 2.1*   CALCIUM 8.8   MG 1.6     CBC:   Recent Labs   Lab 04/04/22  1653 04/05/22  0413   WBC 5.73 5.82   HGB 11.4* 11.3*   HCT 34.6* 35.8*   * 149*       Significant Imaging: I have reviewed all pertinent imaging results/findings within the past 24 hours.

## 2022-04-05 NOTE — ASSESSMENT & PLAN NOTE
Patient presenting with GRADY on CKD in setting of ulcerative colitis with colectomy and end ileostomy with reports of multiple stomal/parastomal hernia repairs.  Patient reports longstanding issues with high output ostomy which was managed conservatively for several years but over the past few (3-5) years he has begun to have systemic symptoms associated with his ostomy output.  Reports he has been admitted with GRADY, dehydration, altered mental status and orthostasis in the past.  More recently, patient has been evaluated for intermittent pSBO or bloating episodes.  Was admitted in mid-March requiring NGT for presumed pSBO, had a a normal SBFT at that time.     Currently patient does not appear obstructed and is not having large volume output but it appears that has been a problem that he is now no longer able to compensate for with PO intake.  Reports it has been at least 5 years since any endoscopic investigation of ileostomy.    Recommendations:  -send stool studies, c. Diff, stool culture, ova/parasite, giardia/crypto  -send CRP, fecal calprotectin  -will discus need for ileoscopy while inpatient, make NPO@MN   -trend CBC, CMP, INR  -continue IVF and continue to encourage PO intake  -once c. Diff ruled out can start and maximize anti-diarrheals  -aggressive fiber supplementation

## 2022-04-05 NOTE — CONSULTS
[facility] - Tele[facility] Stepdown ([place] [address])  Gastroenterology  Consult Note    Patient Name: Jarrett Charlton Jr.  MRN: [id number]  Admission Date: 4/4/2022  Hospital Length of Stay: 0 days  Code Status: Full Code   Attending Provider: Henri Cabral MD   Consulting Provider: Roly Lindo MD  Primary Care Physician: Poli Gonzalez MD  Principal Problem:GRADY (acute kidney injury)    Inpatient consult to Gastroenterology  Consult performed by: Roly Lindo MD  Consult ordered by: Gerber Payne MD        Subjective:     HPI:  72 M w/ PMH BPH, CAD, GERD, HTN, HLD, non-melanoma skin cancer, ulcerative colitis s/p colectomy and end ileostomy admitted with lab findings of GRADY on CKD.  Patient was previously seen by GI and CRS in outpatient setting for evaluation and management of ostomy and ostomy output.  He has had multiple intraabdominal surgeries, most recently a parastomal hernia repair in 2020.  Patient reports many years of waxing and waning increasing output from his ostomy.  He reports he usually controls his symptoms with increasing fiber supplementation.  At most recent GI visit he was treated with rifaximin and viberzi but he reports these were expensive medications and did not help.  Also reports he has used PRN lomitil in the past, has also used imodium.      Of note, patient was recently treated at [facility] for pSBO requiring NGT placement in March 2022.  At that time, he was treated conservatively.  SBFT performed 3/16 showing resolution of any bowel obstruction and contrast reaching ostomy.      On admission, patient was HDS and afebrile.  Labs notable for Hgb 11.4, Cr 3.0-->2.1 after IVF, albumin 3.0, unremarkable CMP otherwise.  Non-contrasted CT without evidence of SBO.        Past Medical History:   Diagnosis Date    Basal cell carcinoma     BPH (benign prostatic hyperplasia)     s/p TURP    Carotid stenosis     Chronic kidney disease     Claudication     Coronary artery  disease     DDD (degenerative disc disease) 10/21/2013    Diabetes mellitus with renal complications     Disc disease, degenerative, cervical     Encounter for blood transfusion     GERD (gastroesophageal reflux disease)     Gout, chronic     History of ulcerative colitis     s/p colectomy and ileostomy    HLD (hyperlipidemia)     HTN (hypertension)     Ileostomy in place 1982    RBBB     Squamous cell carcinoma 03/08/2018    Left superior helix near insertion    Squamous cell carcinoma 04/12/2018    Left forearm x 5    Ventricular tachycardia        Past Surgical History:   Procedure Laterality Date    cardiac stents      CATARACT EXTRACTION Bilateral     CERVICAL FUSION      colectomy and ileostomy  1985    EXCISION OF PAROTID GLAND Left 12/18/2020    Procedure: EXCISION, PAROTID GLAND;  Surgeon: Michael Pinzon MD;  Location: [facility] OR 2ND [facility];  Service: ENT;  Laterality: Left;    INSERTION OF IMPLANTABLE LOOP RECORDER  06/07/2021    INSERTION OF IMPLANTABLE LOOP RECORDER Left 6/7/2021    Procedure: INSERTION, IMPLANTABLE LOOP RECORDER;  Surgeon: Romario Dao MD;  Location: [facility] CATH LAB;  Service: Cardiology;  Laterality: Left;    LEFT HEART CATHETERIZATION Right 4/15/2021    Procedure: CATHETERIZATION, HEART, LEFT;  Surgeon: Marcio Jones MD;  Location: [facility] CATH LAB;  Service: Cardiology;  Laterality: Right;    LYSIS OF ADHESIONS N/A 11/9/2020    Procedure: LYSIS, ADHESIONS,  ERAS low;  Surgeon: CED Haley MD;  Location: [facility] OR 2ND FLR;  Service: Colon and Rectal;  Laterality: N/A;    REPAIR, HERNIA, PARASTOMAL N/A 11/9/2020    Procedure: REPAIR, HERNIA, PARASTOMAL;  Surgeon: CED Haley MD;  Location: [facility] OR 2ND FLR;  Service: Colon and Rectal;  Laterality: N/A;    TRANSURETHRAL RESECTION OF PROSTATE (TURP) WITHOUT USE OF LASER N/A 1/23/2019    Procedure: TURP, WITHOUT USING LASER BIPOLAR;  Surgeon: Catarino Mota MD;  Location: [facility] OR 1ST [facility];  Service:  Urology;  Laterality: N/A;  1.5 HOURS       Review of patient's allergies indicates:   Allergen Reactions    Crestor [rosuvastatin]     Lipitor [atorvastatin]      Family History       Problem Relation (Age of Onset)    Cancer Father    Dementia Mother    Diabetes Father          Tobacco Use    Smoking status: Never Smoker    Smokeless tobacco: Never Used   Substance and Sexual Activity    Alcohol use: No    Drug use: No    Sexual activity: Not Currently     Partners: Female     Review of Systems   Constitutional:  Positive for appetite change and fatigue.   HENT: Negative.     Eyes: Negative.    Respiratory: Negative.     Cardiovascular: Negative.    Gastrointestinal:  Positive for diarrhea. Negative for abdominal distention and abdominal pain.   Endocrine: Negative.    Genitourinary: Negative.    Musculoskeletal: Negative.    Skin: Negative.    Allergic/Immunologic: Negative.    Neurological: Negative.    Hematological: Negative.    Psychiatric/Behavioral: Negative.     Objective:     Vital Signs (Most Recent):  Temp: 97.6 °F (36.4 °C) (04/05/22 0716)  Pulse: 66 (04/05/22 0902)  Resp: 17 (04/05/22 0716)  BP: (!) 174/92 (04/05/22 0902)  SpO2: 95 % (04/05/22 0716)   Vital Signs (24h Range):  Temp:  [97.5 °F (36.4 °C)-98.2 °F (36.8 °C)] 97.6 °F (36.4 °C)  Pulse:  [64-82] 66  Resp:  [16-20] 17  SpO2:  [95 %-99 %] 95 %  BP: (112-178)/(70-98) 174/92     Weight: 94.2 kg (207 lb 10.8 oz) (04/04/22 2143)  Body mass index is 26.66 kg/m².      Intake/Output Summary (Last 24 hours) at 4/5/2022 0904  Last data filed at 4/5/2022 0100  Gross per 24 hour   Intake 1240 ml   Output 700 ml   Net 540 ml       Lines/Drains/Airways       Drain  Duration                  Ileostomy 01/07/19 1101 RLQ 1183 days         Closed/Suction Drain 12/18/20 0938 Left Neck Bulb 15 Fr. 472 days              Peripheral Intravenous Line  Duration                  Peripheral IV - Single Lumen 04/04/22 1545 20 G Right Antecubital <1 day                     Physical Exam    Gen: NAD, lying comfortably  HENT: NCAT, oropharynx clear  Eyes: anicteric sclerae, EOMI grossly  Neck: supple, no visible masses/goiter  Cardiac: RRR, no M/R/G, S1/S2 present  Lungs: CTAB, no crackles, no wheezes  Abd: soft, NT/ND, normoactive BS, ileostomy in place, retracted midline surgical incision   Ext: no LE edema, warm, well perfused  Skin: skin intact on exposed body parts, no visible rashes, lesions  Neuro: A&Ox4, neuro exam grossly intact, moves all extremities  Psych: appropriate mood, affect      Significant Labs:  CBC:   Recent Labs   Lab 04/04/22  1653 04/05/22  0413   WBC 5.73 5.82   HGB 11.4* 11.3*   HCT 34.6* 35.8*   * 149*     CMP:   Recent Labs   Lab 04/05/22  0413   GLU 74   CALCIUM 8.8   ALBUMIN 3.0*   PROT 6.1      K 3.5   CO2 28      BUN 35*   CREATININE 2.1*   ALKPHOS 107   ALT 28   AST 29   BILITOT 0.6     Coagulation: No results for input(s): PT, INR, APTT in the last 48 hours.    Significant Imaging:  Imaging results within the past 24 hours have been reviewed.    Assessment/Plan:     High output ileostomy  Patient presenting with GRADY on CKD in setting of ulcerative colitis with colectomy and end ileostomy with reports of multiple stomal/parastomal hernia repairs.  Patient reports longstanding issues with high output ostomy which was managed conservatively for several years but over the past few (3-5) years he has begun to have systemic symptoms associated with his ostomy output.  Reports he has been admitted with GRADY, dehydration, altered mental status and orthostasis in the past.  More recently, patient has been evaluated for intermittent pSBO or bloating episodes.  Was admitted in mid-March requiring NGT for presumed pSBO, had a a normal SBFT at that time.     Currently patient does not appear obstructed and is not having large volume output but it appears that has been a problem that he is now no longer able to compensate for with PO  intake.  Reports it has been at least 5 years since any endoscopic investigation of ileostomy.    Recommendations:  -send stool studies, c. Diff, stool culture, ova/parasite, giardia/crypto  -send CRP, fecal calprotectin  -will discus need for ileoscopy while inpatient, make NPO@MN   -trend CBC, CMP, INR  -continue IVF and continue to encourage PO intake  -once c. Diff ruled out can start and maximize anti-diarrheals  -aggressive fiber supplementation        Thank you for your consult. I will follow-up with patient. Please contact us if you have any additional questions.    Roly Lindo MD  Gastroenterology  [facility] - Telemetry Stepdown ([place])

## 2022-04-05 NOTE — ASSESSMENT & PLAN NOTE
Improving: Cr 2.5 --> 2.1, will continue to trend  - patient with presumed Pre-renal GRADY secondary to hypovolemia, pt with recent hx of high output from Ostomy, receiving every other week outpatient IV fluids via cardiology clinic,   - no bleeding from ostomy, no nausea/vomiting and abdominal pain - CT scan shows improved bowel distension present on recent Upper GI with small bowel follow through.   - Today patient has had no Ostomy output since this morning   - s/p 1L of LR.  Continue IV fluids overnight - 200cc/hr LR x 10 hours.   - Trend metabolic panels.  Consult Nephrology if not improving   - No overt metablic acidosis that might be seen with High output ostomy  - Continue PRN Loperamide for patient - reports superior to   - Continue Psyllium   - GI consult to evaluate if further medical therapy to modify attenuate ostomy outupt.    - plan for ileoscopy on 4/6, NPO at midnight   - stool studies ordered, continue LR infusion at 200 cc/hr x 10 hours  - Reports his cardiologist (Dr. Dao) will likely be unable to continue administering outpatient IV fludis - At discharge discuss with patient if he may require d/c with a PICC line and Home health orders for intermittent IV fluid administration. Involve Dr. Poli Gonzalez, pts PCP accordingly on this potential plan.     Serum creatinine: 2.1 mg/dL (H) 04/05/22 0413  Estimated creatinine clearance: 36.4 mL/min (A)

## 2022-04-05 NOTE — HPI
"72-year-old white man history ulcerative colitis status post colectomy in 1985 with ileostomy, type 2 diabetes not on insulin, hypertension, CAD, CKD, gout, he is referred for hospital admission from the emergency department for concerns of acute kidney injury.    He was admitted to [facility] March 9 to March 11th after presenting with abdominal distension was found with concerns for partial bowel obstruction, managed with NG tube placement and decompression with improvement in symptoms and ultimately discharged home.  He states his cardiologist Dr. Dao has not come to clinic every other week on Fridays to receive intermittent IV fluid administrations usually 1 L fluids (presumed normal saline).  He reports Dr. Dao will no longer be be able to see him in the coming weeks.  He was states also seen in the [facility] emergency department on April 1st Friday but there is no encounter in epic that matches this date.    He has had recent history of increased Ostomy output, states normal for him would be emptying the ostomy every 2 hours, he even wakes up [id number] to empty ostomy.  When he has diarrhea, describes having liquid output "looks like pea soup" which may require emptying ostomy every 3-4 times per hour when it occurs.  He was seen by surgeon and instructed to take Fiber pills, he is taking 3 Fiber pills in the evening but doesn't note significant improvement.  He has been prescribed Lo-Motil before, states its not very effective, he does take Liquid Immodium (loperamide) when he has high output from ostomy.     Seen in nephrology clinic today and noted to have Cr of 3.0 on labs, up from baseline level (1.3-1.5).  he denies any NSAID use, no other new medications.  On interview today c/o of some light headed and blurred vision feeling - as he is diabetic, took AM meds including Glimeperide but has eaten very little today.   He denies any acute abdominal pain or distension " today    ED workup included CT abdomen demonstrates - no concern for acute obstruction - improved/resolution of gaseous distension noted on recent abdominal imaging studies.     ED Treatment - 1 Liter LR

## 2022-04-05 NOTE — PROGRESS NOTES
[facility] [facility]  [facility] Note    Patient Name: Jarrett Charlton Jr.  MRN: [id number]  Patient Class: OP- Observation   Admission Date: 4/4/2022  Length of Stay: 0 days  Attending Physician: Henri Cabral MD  Primary Care Provider: Poli Gonzalez MD        Subjective:     Principal Problem:GRADY (acute kidney injury)        HPI:  72-year-old white man history ulcerative colitis status post colectomy in 1985 with ileostomy, type 2 diabetes not on insulin, hypertension, CAD, CKD, gout, he is referred for hospital admission from the emergency department for concerns of acute kidney injury.    He was admitted to [facility] March 9 to March 11th after presenting with abdominal distension was found with concerns for partial bowel obstruction, managed with NG tube placement and decompression with improvement in symptoms and ultimately discharged home.  He states his cardiologist Dr. Dao has not come to clinic every other week on Fridays to receive intermittent IV fluid administrations usually 1 L fluids (presumed normal saline).  He reports Dr. Dao will no longer be be able to see him in the coming weeks.  He was states also seen in the [facility] emergency department on April 1st Friday but there is no encounter in epic that matches this date.    He has had recent history of increased Ostomy output, states normal for him would be emptying the ostomy every 2 hours, he even wakes up [id number] to empty ostomy.  When he has diarrhea, describes having liquid output "looks like pea soup" which may require emptying ostomy every 3-4 times per hour when it occurs.  He was seen by surgeon and instructed to take Fiber pills, he is taking 3 Fiber pills in the evening but doesn't note significant improvement.  He has been prescribed Lo-Motil before, states its not very effective, he does take Liquid Immodium (loperamide) when he has high output from ostomy. "     Seen in nephrology clinic today and noted to have Cr of 3.0 on labs, up from baseline level (1.3-1.5).  he denies any NSAID use, no other new medications.  On interview today c/o of some light headed and blurred vision feeling - as he is diabetic, took AM meds including Glimeperide but has eaten very little today.   He denies any acute abdominal pain or distension today    ED workup included CT abdomen demonstrates - no concern for acute obstruction - improved/resolution of gaseous distension noted on recent abdominal imaging studies.     ED Treatment - 1 Liter LR       Overview/Hospital Course:  Patient admitted to observation for acute kidney injury. Cr 2.5 --> 2.1 after receiving LR infusion overnight. GI consulted and recommend stool studies and ileoscopy.       Interval History: Patient seen and examined today. NAVEEN MELGAR. Cr improved to 2.1 after continuous LR overnight. Patient reports a small amount of semi-formed stool in the ileostomy overnight. GI consulted and recommend stool studies, continue LR infusion, and plan for ileoscopy in the morning. NPO at midnight.    Review of Systems   Constitutional:  Positive for appetite change and fatigue.   HENT: Negative.     Eyes: Negative.    Respiratory: Negative.     Cardiovascular: Negative.    Gastrointestinal:  Positive for diarrhea. Negative for abdominal distention and abdominal pain.   Endocrine: Negative.    Genitourinary: Negative.    Musculoskeletal: Negative.    Skin: Negative.    Allergic/Immunologic: Negative.    Neurological: Negative.    Hematological: Negative.    Psychiatric/Behavioral: Negative.     Objective:     Vital Signs (Most Recent):  Temp: 97.9 °F (36.6 °C) (04/05/22 1620)  Pulse: 61 (04/05/22 1620)  Resp: 17 (04/05/22 1620)  BP: (!) 161/86 (04/05/22 1620)  SpO2: 98 % (04/05/22 1620)   Vital Signs (24h Range):  Temp:  [97.5 °F (36.4 °C)-97.9 °F (36.6 °C)] 97.9 °F (36.6 °C)  Pulse:  [60-82] 61  Resp:  [16-20] 17  SpO2:  [95 %-99 %]  98 %  BP: (113-178)/(71-98) 161/86     Weight: 94.2 kg (207 lb 10.8 oz)  Body mass index is 26.66 kg/m².    Intake/Output Summary (Last 24 hours) at 4/5/2022 1621  Last data filed at 4/5/2022 0100  Gross per 24 hour   Intake 1240 ml   Output 700 ml   Net 540 ml      Physical Exam  Vitals and nursing note reviewed.   Constitutional:       General: He is not in acute distress.     Appearance: He is not toxic-appearing.   HENT:      Head: Normocephalic and atraumatic.      Mouth/Throat:      Mouth: Mucous membranes are moist.      Comments: Poor dentition, missing multiple teeth  Eyes:      General: No scleral icterus.     Conjunctiva/sclera: Conjunctivae normal.      Pupils: Pupils are equal, round, and reactive to light.   Cardiovascular:      Rate and Rhythm: Normal rate and regular rhythm.      Pulses: Normal pulses.   Pulmonary:      Effort: Pulmonary effort is normal. No respiratory distress.      Breath sounds: Normal breath sounds. No wheezing or rales.   Abdominal:      General: Abdomen is flat. The ostomy site is clean.      Tenderness: There is no abdominal tenderness.      Comments: Ostomy bag with very small amount of brown liquid stool, in the RLQ   Musculoskeletal:      Cervical back: Neck supple.   Neurological:      Mental Status: He is alert.       Significant Labs: All pertinent labs within the past 24 hours have been reviewed.  BMP:   Recent Labs   Lab 04/05/22  0413   GLU 74      K 3.5      CO2 28   BUN 35*   CREATININE 2.1*   CALCIUM 8.8   MG 1.6     CBC:   Recent Labs   Lab 04/04/22  1653 04/05/22  0413   WBC 5.73 5.82   HGB 11.4* 11.3*   HCT 34.6* 35.8*   * 149*       Significant Imaging: I have reviewed all pertinent imaging results/findings within the past 24 hours.      Assessment/Plan:      * GRADY (acute kidney injury)  Improving: Cr 2.5 --> 2.1, will continue to trend  - patient with presumed Pre-renal GRADY secondary to hypovolemia, pt with recent hx of high output from  Ostomy, receiving every other week outpatient IV fluids via cardiology clinic,   - no bleeding from ostomy, no nausea/vomiting and abdominal pain - CT scan shows improved bowel distension present on recent Upper GI with small bowel follow through.   - Today patient has had no Ostomy output since this morning   - s/p 1L of LR.  Continue IV fluids overnight - 200cc/hr LR x 10 hours.   - Trend metabolic panels.  Consult Nephrology if not improving   - No overt metablic acidosis that might be seen with High output ostomy  - Continue PRN Loperamide for patient - reports superior to   - Continue Psyllium   - GI consult to evaluate if further medical therapy to modify attenuate ostomy outupt.    - plan for ileoscopy on 4/6, NPO at midnight   - stool studies ordered, continue LR infusion at 200 cc/hr x 10 hours  - Reports his cardiologist (Dr. Dao) will likely be unable to continue administering outpatient IV fludis - At discharge discuss with patient if he may require d/c with a PICC line and Home health orders for intermittent IV fluid administration. Involve Dr. Poli Gonzalez, pts PCP accordingly on this potential plan.     Serum creatinine: 2.1 mg/dL (H) 04/05/22 0413  Estimated creatinine clearance: 36.4 mL/min (A)        High output ileostomy  History of ulcerative colitis    As per GRADY  -CT scan rules out obstruction.       Essential hypertension  - Continue home medications as appropriate  - Pt is hypertensive in the ED  - prn hydralazine for SBP >180    Type 2 diabetes mellitus with diabetic polyneuropathy, without long-term current use of insulin  - continue Gabapentin but renal dosing at this time  Lab Results   Component Value Date    HGBA1C 7.0 (H) 03/04/2022         CAD (coronary artery disease)  - Continue clopidogrel, statin      Idiopathic chronic gout of multiple sites with tophus  - Continue chronic allopurinol - may have to renally adjust dose.       Type 2 diabetes mellitus with stage 3 chronic kidney  disease, without long-term current use of insulin  - on amaryl BID at home  -low dose insulin sliding scale and accucheck monitoring while inpatient.   Diabetic diet        VTE Risk Mitigation (From admission, onward)         Ordered     heparin (porcine) injection 5,000 Units  Every 8 hours         04/04/22 2237     IP VTE HIGH RISK PATIENT  Once         04/04/22 2237     Place sequential compression device  Until discontinued         04/04/22 2237     Place sequential compression device  Until discontinued         04/04/22 1902                Discharge Planning   JOSÉ MIGUEL: 4/6/2022     Code Status: Full Code   Is the patient medically ready for discharge?: No    Reason for patient still in hospital (select all that apply): Patient trending condition, Treatment and Consult recommendations                     Susan Frey PA-C  Department of Hospital Medicine   [facility] - Tele[facility] Stepdown ([place]

## 2022-04-05 NOTE — PLAN OF CARE
Patient alert x 4, denies pain.  Encouraged fluid.  Tolerated well fluids and food. Urine without difficulty see I/O. Asleep by 0200 slept without distress.    Problem: Oral Intake Inadequate (Acute Kidney Injury/Impairment)  Goal: Optimal Nutrition Intake  Outcome: Ongoing, Progressing     Problem: Fluid and Electrolyte Imbalance (Acute Kidney Injury/Impairment)  Goal: Fluid and Electrolyte Balance  Outcome: Ongoing, Progressing

## 2022-04-05 NOTE — ASSESSMENT & PLAN NOTE
- continue Gabapentin but renal dosing at this time  Lab Results   Component Value Date    HGBA1C 7.0 (H) 03/04/2022

## 2022-04-05 NOTE — ASSESSMENT & PLAN NOTE
- Continue home medications as appropriate  - Pt is hypertensive in the ED  - prn hydralazine for SBP >180

## 2022-04-05 NOTE — ANESTHESIA PREPROCEDURE EVALUATION
[facility]  Anesthesia Pre-Operative Evaluation         Patient Name: Jarrett Charlton Jr.  YOB: 1950  MRN: [id number]    SUBJECTIVE:     Pre-operative evaluation for Procedure(s) (LRB):  ENDOSCOPY, POUCH, SMALL INTESTINE, DIAGNOSTIC (N/A)     04/05/2022    Jarrett Charlton Jr. is a 72 y.o. male w/ a significant PMHx of CKD3, DM, HTN, and UC s/p colectomy with end ileostomy in 1985, Followed by CRS, recently established with GI.  Not on medication for IBD, concern for chronic partial small bowel obstruction. .    Patient now presents for the above procedure(s).      LDA:        Peripheral IV - Single Lumen 04/04/22 1545 20 G Right Antecubital (Active)   Site Assessment Clean;Dry;Intact;No redness;No swelling 04/05/22 1113   Extremity Assessment Distal to IV No abnormal discoloration;No redness;No swelling;No warmth 04/05/22 1113   Line Status Flushed;Saline locked 04/05/22 1113   Dressing Status Clean;Dry;Intact 04/05/22 1113   Dressing Intervention Integrity maintained 04/05/22 1113   Dressing Change Due 04/08/22 04/05/22 1113   Site Change Due 04/08/22 04/05/22 1113   Reason Not Rotated Not due 04/04/22 1545   Number of days: 0            Closed/Suction Drain 12/18/20 0938 Left Neck Bulb 15 Fr. (Active)   Number of days: 473            Ileostomy 01/07/19 1101 RLQ (Active)   Stoma Appearance pink 03/11/22 1505   Treatment Bag change 03/10/22 2039   Peristomal Assessment Intact;Clean 03/11/22 1505   Output (mL) 500 mL 03/11/22 1000   Number of days: 1184        Prev airway: None documented.    Drips: None documented.       Patient Active Problem List   Diagnosis    Type 2 diabetes mellitus with stage 3 chronic kidney disease, without long-term current use of insulin    Essential hypertension    Chronic renal impairment    Idiopathic chronic gout of multiple sites with tophus    HLD (hyperlipidemia)    BPH (benign prostatic hyperplasia)    Disc disease,  degenerative, cervical    GERD (gastroesophageal reflux disease)    History of ulcerative colitis    CKD (chronic kidney disease) stage 3, GFR 30-59 ml/min    Proteinuria    DDD (degenerative disc disease)    Generalized osteoarthritis    Elevated PSA    S/P TURP    CAD (coronary artery disease)    Presence of arterial stent    GRADY (acute kidney injury)    Abnormal LFTs    SBO (small bowel obstruction)    High output ileostomy    History of gout    History of elevated PSA    History of BPH    Bowel obstruction    Parastomal hernia with obstruction and without gangrene    Incisional hernia, without obstruction or gangrene    Hyperkalemia    Metabolic acidosis with normal anion gap and bicarbonate losses    Pleural plaque    Aortic atherosclerosis    Type 2 diabetes mellitus with diabetic polyneuropathy, without long-term current use of insulin    Ileostomy in place    Status post repair of ventral hernia    Parotid mass    Prostate cancer    Polyneuropathy associated with underlying disease    CKD (chronic kidney disease)    VT (ventricular tachycardia)    Paroxysmal ventricular tachycardia    Colostomy present    Hypokalemia    Dehydration       Review of patient's allergies indicates:   Allergen Reactions    Crestor [rosuvastatin]     Lipitor [atorvastatin]        Current Outpatient Medications:    Current Facility-Administered Medications:     acetaminophen tablet 650 mg, 650 mg, Oral, Q4H PRN, Gerber Payne MD    allopurinol split tablet 50 mg, 50 mg, Oral, Daily, Gerber Payne MD, 50 mg at 04/05/22 0806    ALPRAZolam tablet 0.5 mg, 0.5 mg, Oral, Nightly PRN, Gerber Payne MD    amiodarone tablet 200 mg, 200 mg, Oral, BID, Gerber Payne MD, 200 mg at 04/05/22 0807    aspirin EC tablet 81 mg, 81 mg, Oral, Daily, Gerber Payne MD, 81 mg at 04/05/22 0807    brimonidine 0.2% ophthalmic solution 1 drop, 1 drop, Both Eyes, BID, Gerber Payne MD, 1 drop  at 04/05/22 0904    carvediloL tablet 12.5 mg, 12.5 mg, Oral, BID WM, Gerber Payne MD, 12.5 mg at 04/05/22 0902    clopidogreL tablet 75 mg, 75 mg, Oral, Daily, Gerber Payne MD, 75 mg at 04/05/22 0807    dextrose 10% bolus 125 mL, 12.5 g, Intravenous, PRN, Gerber Payne MD    dextrose 10% bolus 250 mL, 25 g, Intravenous, PRN, Gerber Payne MD    gabapentin capsule 300 mg, 300 mg, Oral, BID, Gerber aPyne MD, 300 mg at 04/05/22 0807    glucagon (human recombinant) injection 1 mg, 1 mg, Intramuscular, PRN, Gerber Payne MD    glucose chewable tablet 16 g, 16 g, Oral, PRN, Gerber Payne MD    glucose chewable tablet 24 g, 24 g, Oral, PRN, Gerber Payne MD    heparin (porcine) injection 5,000 Units, 5,000 Units, Subcutaneous, Q8H, Gerber Payne MD, 5,000 Units at 04/05/22 1523    insulin aspart U-100 pen 0-5 Units, 0-5 Units, Subcutaneous, QID (AC + HS) PRN, Gerber Payne MD    loperamide capsule 2 mg, 2 mg, Oral, QID PRN, Gerber Payne MD    melatonin tablet 6 mg, 6 mg, Oral, Nightly PRN, Gerber Payne MD    naloxone 0.4 mg/mL injection 0.02 mg, 0.02 mg, Intravenous, PRN, Gerber Payne MD    pravastatin tablet 80 mg, 80 mg, Oral, Daily, Gerber Payne MD, 80 mg at 04/05/22 0807    prochlorperazine injection Soln 5 mg, 5 mg, Intravenous, Q6H PRN, Gerber Payne MD    psyllium husk (aspartame) 3.4 gram packet 2 packet, 2 packet, Oral, BID WM, Gerber Payne MD, 2 packet at 04/05/22 0745    sodium chloride 0.9% flush 10 mL, 10 mL, Intravenous, PRN, Gerber Payne MD    sodium chloride 0.9% flush 10 mL, 10 mL, Intravenous, Q6H PRN, Gerber Payne MD    Facility-Administered Medications Ordered in Other Encounters:     sodium chloride 0.9% in 1,000 mL infusion, , Intravenous, Continuous, Paper Order    Past Surgical History:   Procedure Laterality Date    cardiac stents      CATARACT EXTRACTION Bilateral     CERVICAL FUSION       colectomy and ileostomy  1985    EXCISION OF PAROTID GLAND Left 12/18/2020    Procedure: EXCISION, PAROTID GLAND;  Surgeon: Michael Pinzon MD;  Location: [facility] OR 2ND FLR;  Service: ENT;  Laterality: Left;    INSERTION OF IMPLANTABLE LOOP RECORDER  06/07/2021    INSERTION OF IMPLANTABLE LOOP RECORDER Left 6/7/2021    Procedure: INSERTION, IMPLANTABLE LOOP RECORDER;  Surgeon: Romario Dao MD;  Location: [facility] CATH LAB;  Service: Cardiology;  Laterality: Left;    LEFT HEART CATHETERIZATION Right 4/15/2021    Procedure: CATHETERIZATION, HEART, LEFT;  Surgeon: Marcio Jones MD;  Location: [facility] CATH LAB;  Service: Cardiology;  Laterality: Right;    LYSIS OF ADHESIONS N/A 11/9/2020    Procedure: LYSIS, ADHESIONS,  ERAS low;  Surgeon: CED Haley MD;  Location: [facility] OR 2ND FLR;  Service: Colon and Rectal;  Laterality: N/A;    REPAIR, HERNIA, PARASTOMAL N/A 11/9/2020    Procedure: REPAIR, HERNIA, PARASTOMAL;  Surgeon: CED Haley MD;  Location: [facility] OR 2ND FLR;  Service: Colon and Rectal;  Laterality: N/A;    TRANSURETHRAL RESECTION OF PROSTATE (TURP) WITHOUT USE OF LASER N/A 1/23/2019    Procedure: TURP, WITHOUT USING LASER BIPOLAR;  Surgeon: Catarino Mota MD;  Location: [facility] OR 1ST FLR;  Service: Urology;  Laterality: N/A;  1.5 HOURS       Social History     Socioeconomic History    Marital status:     Number of children: 1   Occupational History    Occupation:  x 44 years     Comment: Retired    Occupation: [place]    Tobacco Use    Smoking status: Never Smoker    Smokeless tobacco: Never Used   Substance and Sexual Activity    Alcohol use: No    Drug use: No    Sexual activity: Not Currently     Partners: Female       OBJECTIVE:     Vital Signs Range (Last 24H):  Temp:  [36.4 °C (97.5 °F)-36.6 °C (97.9 °F)]   Pulse:  [60-82]   Resp:  [16-20]   BP: (113-178)/(71-98)   SpO2:  [95 %-99 %]       Significant Labs:  Lab Results   Component Value  Date    WBC 5.82 04/05/2022    HGB 11.3 (L) 04/05/2022    HCT 35.8 (L) 04/05/2022     (L) 04/05/2022    CHOL 148 03/04/2022    TRIG 182 (H) 03/04/2022    HDL 39 (L) 03/04/2022    ALT 28 04/05/2022    AST 29 04/05/2022     04/05/2022    K 3.5 04/05/2022     04/05/2022    CREATININE 2.1 (H) 04/05/2022    BUN 35 (H) 04/05/2022    CO2 28 04/05/2022    TSH 3.559 03/04/2022    PSA 0.18 11/19/2020    INR 1.0 04/02/2021    HGBA1C 7.0 (H) 03/04/2022       Diagnostic Studies: No relevant studies.    EKG:   Results for orders placed or performed during the hospital encounter of 04/04/22   EKG 12-lead    Collection Time: 04/04/22  4:27 PM    Narrative    Test Reason : N17.9,    Vent. Rate : 065 BPM     Atrial Rate : 065 BPM     P-R Int : 188 ms          QRS Dur : 126 ms      QT Int : 492 ms       P-R-T Axes : 026 -56 017 degrees     QTc Int : 511 ms    Normal sinus rhythm  Left axis deviation  Right bundle branch block  Probable  Inferior infarct (cited on or before 07-JUN-2021)  Abnormal ECG  When compared with ECG of 10-SEP-2021 06:07,  Criteria for Anterior infarct less evident  Questionable change in initial forces of Inferior leads  Nonspecific T wave abnormality has replaced inverted T waves in Inferior  leads  T wave inversion no longer evident in Anterior leads  Confirmed by Celio GUAN MD ([id number]) on 4/5/2022 9:44:31 AM    Referred By: [email or web address]   SELF           Confirmed By:Celio GUAN MD       2D ECHO:  TTE:  No results found. However, due to the size of the patient record, not all encounters were searched. Please check Results Review for a complete set of results.    HUMBERTO:  No results found. However, due to the size of the patient record, not all encounters were searched. Please check Results Review for a complete set of results.    ASSESSMENT/PLAN:                                                                                                      04/05/2022  Jarrett Charlton Jr. is a 72 y.o.,  "male.      Pre-op Assessment    I have reviewed the Patient Summary Reports.     I have reviewed the Nursing Notes. I have reviewed the NPO Status.   I have reviewed the Medications.     Review of Systems  Anesthesia Hx:  No problems with previous Anesthesia SMALL MOUTH History of prior surgery of interest to airway management or planning: Previous anesthesia: General 11/9/20 hernia repair with general anesthesia.  Procedure performed at an [facility]. Denies Family Hx of Anesthesia complications.   Denies Personal Hx of Anesthesia complications.   Social:  Non-Smoker, No Alcohol Use    Hematology/Oncology:  Hematology Normal      Current/Recent Cancer. (prostate cancer, PSA increasing, on Lupron injections) --  Cancer in past history (SCCA left auricle and arm-removed):    EENT/Dental:   Facial drooping Throat Symptoms include Swallowing difficulty or pain  Throat Disease: left parotid mass   Cardiovascular:   Hypertension CAD asymptomatic Dysrhythmias   Functional Capacity good / => 4 METS  Coronary Artery Disease:  patient hx of diagnosis. S/P Percutaneous Coronary Intervention (PCI) coronary stent, unknown type, currently on clopidogrel (Plavix), currently on aspirin.  Hypertension , Fairly Controlled on RX, Rx Recently Decreased , Recent typical clinic B/P of 170/89    Renal/:   Chronic Renal Disease  Kidney Function/Disease, Chronic Kidney Disease (CKD) , CKD Stage II (GFR 60-89)    Hepatic/GI:   GERD, poorly controlled  Esophageal / Stomach Disorders Gerd Controlled by chronic antireflux medication.  Bowel Conditions:  Inflammatory Bowel Disease (pt has an ileostomy), Ulcerative Colitis past history, s/p surgical resection   Musculoskeletal:   Arthritis   Cervical Spine Disorder, Cervical Disc Disease, S/P Cervical Fusion ("minimal decrease in ROM", pt states was done years ago in "lower" cervical area), Cervical Spine Limited Mobility    Neurological:  Neurology Normal    Endocrine:   Diabetes, well " controlled, type 2  Diabetes, Type 2 Diabetes , controlled by oral hypoglycemics. Typical AM glucose range: 100 , most recent HgA1c value was 6.4 on 9/26/20.    Psych:   depression          Physical Exam  General: Well nourished, Cooperative and Alert    Airway:  Mallampati: III / II  Mouth Opening: Normal  TM Distance: Normal  Tongue: Normal  Neck ROM: Normal ROM    Dental:  Intact    Chest/Lungs:  Normal Respiratory Rate    Heart:  Rate: Normal    Abdomen:  Normal        Anesthesia Plan  Type of Anesthesia, risks & benefits discussed:    Anesthesia Type: MAC  Intra-op Monitoring Plan: Standard ASA Monitors  Post Op Pain Control Plan: multimodal analgesia  Induction:  IV  Informed Consent: Informed consent signed with the Patient and all parties understand the risks and agree with anesthesia plan.  All questions answered.   ASA Score: 3  Day of Surgery Review of History & Physical: H&P Update referred to the surgeon/provider.    Ready For Surgery From Anesthesia Perspective.     .

## 2022-04-06 ENCOUNTER — ANESTHESIA (OUTPATIENT)
Dept: ENDOSCOPY | Facility: HOSPITAL | Age: 72
End: 2022-04-06
Payer: MEDICARE

## 2022-04-06 VITALS
RESPIRATION RATE: 17 BRPM | HEIGHT: 74 IN | BODY MASS INDEX: 26.66 KG/M2 | SYSTOLIC BLOOD PRESSURE: 165 MMHG | HEART RATE: 61 BPM | TEMPERATURE: 98 F | DIASTOLIC BLOOD PRESSURE: 96 MMHG | WEIGHT: 207.69 LBS | OXYGEN SATURATION: 96 %

## 2022-04-06 LAB
ALBUMIN SERPL BCP-MCNC: 2.9 G/DL (ref 3.5–5.2)
ALP SERPL-CCNC: 103 U/L (ref 55–135)
ALT SERPL W/O P-5'-P-CCNC: 25 U/L (ref 10–44)
ANION GAP SERPL CALC-SCNC: 11 MMOL/L (ref 8–16)
AST SERPL-CCNC: 26 U/L (ref 10–40)
BASOPHILS # BLD AUTO: 0.02 K/UL (ref 0–0.2)
BASOPHILS NFR BLD: 0.4 % (ref 0–1.9)
BILIRUB SERPL-MCNC: 0.7 MG/DL (ref 0.1–1)
BUN SERPL-MCNC: 27 MG/DL (ref 8–23)
C DIFF GDH STL QL: NEGATIVE
C DIFF TOX A+B STL QL IA: NEGATIVE
CALCIUM SERPL-MCNC: 8.6 MG/DL (ref 8.7–10.5)
CHLORIDE SERPL-SCNC: 107 MMOL/L (ref 95–110)
CO2 SERPL-SCNC: 26 MMOL/L (ref 23–29)
CREAT SERPL-MCNC: 1.7 MG/DL (ref 0.5–1.4)
CRYPTOSP AG STL QL IA: NEGATIVE
DIFFERENTIAL METHOD: ABNORMAL
EOSINOPHIL # BLD AUTO: 0.1 K/UL (ref 0–0.5)
EOSINOPHIL NFR BLD: 2.6 % (ref 0–8)
ERYTHROCYTE [DISTWIDTH] IN BLOOD BY AUTOMATED COUNT: 14.9 % (ref 11.5–14.5)
EST. GFR  (AFRICAN AMERICAN): 45.2 ML/MIN/1.73 M^2
EST. GFR  (NON AFRICAN AMERICAN): 39.1 ML/MIN/1.73 M^2
G LAMBLIA AG STL QL IA: NEGATIVE
GLUCOSE SERPL-MCNC: 130 MG/DL (ref 70–110)
HCT VFR BLD AUTO: 34.2 % (ref 40–54)
HGB BLD-MCNC: 11 G/DL (ref 14–18)
IMM GRANULOCYTES # BLD AUTO: 0.01 K/UL (ref 0–0.04)
IMM GRANULOCYTES NFR BLD AUTO: 0.2 % (ref 0–0.5)
INR PPP: 1.1 (ref 0.8–1.2)
LYMPHOCYTES # BLD AUTO: 1.4 K/UL (ref 1–4.8)
LYMPHOCYTES NFR BLD: 29.6 % (ref 18–48)
MAGNESIUM SERPL-MCNC: 1.4 MG/DL (ref 1.6–2.6)
MCH RBC QN AUTO: 28.9 PG (ref 27–31)
MCHC RBC AUTO-ENTMCNC: 32.2 G/DL (ref 32–36)
MCV RBC AUTO: 90 FL (ref 82–98)
MONOCYTES # BLD AUTO: 0.3 K/UL (ref 0.3–1)
MONOCYTES NFR BLD: 7 % (ref 4–15)
NEUTROPHILS # BLD AUTO: 2.8 K/UL (ref 1.8–7.7)
NEUTROPHILS NFR BLD: 60.2 % (ref 38–73)
NRBC BLD-RTO: 0 /100 WBC
PHOSPHATE SERPL-MCNC: 2.8 MG/DL (ref 2.7–4.5)
PLATELET # BLD AUTO: 130 K/UL (ref 150–450)
PMV BLD AUTO: 10.8 FL (ref 9.2–12.9)
POCT GLUCOSE: 127 MG/DL (ref 70–110)
POCT GLUCOSE: 135 MG/DL (ref 70–110)
POCT GLUCOSE: 137 MG/DL (ref 70–110)
POTASSIUM SERPL-SCNC: 3.8 MMOL/L (ref 3.5–5.1)
PROT SERPL-MCNC: 5.5 G/DL (ref 6–8.4)
PROTHROMBIN TIME: 11.2 SEC (ref 9–12.5)
RBC # BLD AUTO: 3.81 M/UL (ref 4.6–6.2)
RV AG STL QL IA.RAPID: NEGATIVE
SODIUM SERPL-SCNC: 144 MMOL/L (ref 136–145)
WBC # BLD AUTO: 4.7 K/UL (ref 3.9–12.7)
WBC #/AREA STL HPF: NORMAL /[HPF]

## 2022-04-06 PROCEDURE — 99217 PR OBSERVATION CARE DISCHARGE: CPT | Mod: ,,,

## 2022-04-06 PROCEDURE — 85025 COMPLETE CBC W/AUTO DIFF WBC: CPT | Performed by: HOSPITALIST

## 2022-04-06 PROCEDURE — 83735 ASSAY OF MAGNESIUM: CPT | Performed by: HOSPITALIST

## 2022-04-06 PROCEDURE — 44380 SMALL BOWEL ENDOSCOPY BR/WA: CPT | Performed by: INTERNAL MEDICINE

## 2022-04-06 PROCEDURE — G0378 HOSPITAL OBSERVATION PER HR: HCPCS

## 2022-04-06 PROCEDURE — 37000008 HC ANESTHESIA 1ST 15 MINUTES: Performed by: INTERNAL MEDICINE

## 2022-04-06 PROCEDURE — 94761 N-INVAS EAR/PLS OXIMETRY MLT: CPT

## 2022-04-06 PROCEDURE — D9220A PRA ANESTHESIA: ICD-10-PCS | Mod: ANES,,, | Performed by: SURGERY

## 2022-04-06 PROCEDURE — 84100 ASSAY OF PHOSPHORUS: CPT | Performed by: HOSPITALIST

## 2022-04-06 PROCEDURE — 37000009 HC ANESTHESIA EA ADD 15 MINS: Performed by: INTERNAL MEDICINE

## 2022-04-06 PROCEDURE — 63600175 PHARM REV CODE 636 W HCPCS: Performed by: NURSE ANESTHETIST, CERTIFIED REGISTERED

## 2022-04-06 PROCEDURE — 44380 SMALL BOWEL ENDOSCOPY BR/WA: CPT | Mod: ,,, | Performed by: INTERNAL MEDICINE

## 2022-04-06 PROCEDURE — D9220A PRA ANESTHESIA: Mod: CRNA,,, | Performed by: NURSE ANESTHETIST, CERTIFIED REGISTERED

## 2022-04-06 PROCEDURE — 80053 COMPREHEN METABOLIC PANEL: CPT | Performed by: HOSPITALIST

## 2022-04-06 PROCEDURE — 44380 PR ILEOSCOPY THRU STOMA,DIAGNOSTIC: ICD-10-PCS | Mod: ,,, | Performed by: INTERNAL MEDICINE

## 2022-04-06 PROCEDURE — 82962 GLUCOSE BLOOD TEST: CPT | Performed by: INTERNAL MEDICINE

## 2022-04-06 PROCEDURE — 25000003 PHARM REV CODE 250: Performed by: HOSPITALIST

## 2022-04-06 PROCEDURE — 99217 PR OBSERVATION CARE DISCHARGE: ICD-10-PCS | Mod: ,,,

## 2022-04-06 PROCEDURE — 25000003 PHARM REV CODE 250: Performed by: NURSE ANESTHETIST, CERTIFIED REGISTERED

## 2022-04-06 PROCEDURE — 27000221 HC OXYGEN, UP TO 24 HOURS

## 2022-04-06 PROCEDURE — D9220A PRA ANESTHESIA: Mod: ANES,,, | Performed by: SURGERY

## 2022-04-06 PROCEDURE — 85610 PROTHROMBIN TIME: CPT

## 2022-04-06 PROCEDURE — D9220A PRA ANESTHESIA: ICD-10-PCS | Mod: CRNA,,, | Performed by: NURSE ANESTHETIST, CERTIFIED REGISTERED

## 2022-04-06 PROCEDURE — 36415 COLL VENOUS BLD VENIPUNCTURE: CPT | Performed by: HOSPITALIST

## 2022-04-06 RX ORDER — LIDOCAINE HCL/PF 100 MG/5ML
SYRINGE (ML) INTRAVENOUS
Status: DISCONTINUED | OUTPATIENT
Start: 2022-04-06 | End: 2022-04-06

## 2022-04-06 RX ORDER — PROPOFOL 10 MG/ML
VIAL (ML) INTRAVENOUS CONTINUOUS PRN
Status: DISCONTINUED | OUTPATIENT
Start: 2022-04-06 | End: 2022-04-06

## 2022-04-06 RX ORDER — PROPOFOL 10 MG/ML
VIAL (ML) INTRAVENOUS
Status: DISCONTINUED | OUTPATIENT
Start: 2022-04-06 | End: 2022-04-06

## 2022-04-06 RX ADMIN — Medication 50 MG: at 08:04

## 2022-04-06 RX ADMIN — CLOPIDOGREL 75 MG: 75 TABLET, FILM COATED ORAL at 10:04

## 2022-04-06 RX ADMIN — GABAPENTIN 300 MG: 300 CAPSULE ORAL at 10:04

## 2022-04-06 RX ADMIN — SODIUM CHLORIDE: 9 INJECTION, SOLUTION INTRAVENOUS at 08:04

## 2022-04-06 RX ADMIN — AMIODARONE HYDROCHLORIDE 200 MG: 200 TABLET ORAL at 10:04

## 2022-04-06 RX ADMIN — ALLOPURINOL 50 MG: 300 TABLET ORAL at 10:04

## 2022-04-06 RX ADMIN — ASPIRIN 81 MG: 81 TABLET, COATED ORAL at 10:04

## 2022-04-06 RX ADMIN — Medication 150 MCG/KG/MIN: at 08:04

## 2022-04-06 RX ADMIN — BRIMONIDINE TARTRATE 1 DROP: 2 SOLUTION OPHTHALMIC at 09:04

## 2022-04-06 RX ADMIN — PROPOFOL 60 MG: 10 INJECTION, EMULSION INTRAVENOUS at 08:04

## 2022-04-06 RX ADMIN — PRAVASTATIN SODIUM 80 MG: 40 TABLET ORAL at 10:04

## 2022-04-06 RX ADMIN — PROPOFOL 10 MG: 10 INJECTION, EMULSION INTRAVENOUS at 08:04

## 2022-04-06 NOTE — PLAN OF CARE
[facility] - Telemetry Stepdown ([facility]-7)  Discharge Assessment    Primary Care Provider: Poli Gonzalez MD     Discharge Assessment (most recent)     BRIEF DISCHARGE ASSESSMENT - 04/06/22 7176        Discharge Planning    Assessment Type Discharge Planning Brief Assessment     Resource/Environmental Concerns none     Support Systems Family members     Assistance Needed none     Equipment Currently Used at Home none     Patient/Family Anticipates Transition to home     Patient/Family Anticipated Services at Transition none     DME Needed Upon Discharge  none     Discharge Plan A Home     Discharge Plan B Home                 SW attempted to see pt but he was in a procedure.  When the SW went back, pt was discharged. Assessment completed per chart review.  Pt has no dc needs.  SW arranged f/u appts with PCP, Neph, and GI.    Patricia Moar LCSW   PRN

## 2022-04-06 NOTE — PLAN OF CARE
Patient alert x 4, Tolerated fluids well,  urine output measured see I /O   Slept without distress    Problem: Fluid and Electrolyte Imbalance (Acute Kidney Injury/Impairment)  Goal: Fluid and Electrolyte Balance  Outcome: Ongoing, Progressing     Problem: Renal Function Impairment (Acute Kidney Injury/Impairment)  Goal: Effective Renal Function  Outcome: Ongoing, Progressing

## 2022-04-06 NOTE — TRANSFER OF CARE
"Anesthesia Transfer of Care Note    Patient: Jarrett Charlton Jr.    Procedure(s) Performed: Procedure(s) (LRB):  ENDOSCOPY, POUCH, SMALL INTESTINE, DIAGNOSTIC (N/A)    Patient location: PACU    Anesthesia Type: general    Transport from OR: Transported from OR on 6-10 L/min O2 by face mask with adequate spontaneous ventilation    Post pain: adequate analgesia    Post assessment: no apparent anesthetic complications    Post vital signs: stable    Level of consciousness: awake, alert and oriented    Nausea/Vomiting: no nausea/vomiting    Complications: none    Transfer of care protocol was followed      Last vitals:   Visit Vitals  /65 (BP Location: Left arm, Patient Position: Lying)   Pulse 62   Temp 36.4 °C (97.5 °F) (Temporal)   Resp 14   Ht 6' 2" (1.88 m)   Wt 94.2 kg (207 lb 10.8 oz)   SpO2 98%   BMI 26.66 kg/m²     "

## 2022-04-06 NOTE — PLAN OF CARE
Patient resting comfortably in bed. Denies having any pain. AxOx4. Collected stool sample and sent to lab. LR @ 200/hr. NPO @ 0000. Signed e-consent. Bed in locked and lowest position. Personal belongings and call light within reach.

## 2022-04-06 NOTE — PLAN OF CARE
[facility] - Telemetry Stepdown ([facility]  Discharge Final Note    Primary Care Provider: Poli Gonzalez MD    Expected Discharge Date: 4/6/2022    Final Discharge Note (most recent)     Final Note - 04/06/22 1637        Final Note    Assessment Type Final Discharge Note     Anticipated Discharge Disposition Home or Self Care     Hospital Resources/Appts/Education Provided Appointments scheduled and added to AVS                 Important Message from Medicare             Contact Info     Poli Gonzalez MD   Specialty: Internal Medicine, Pediatrics   Relationship: PCP - General    [address] [address]   Phone: [phone]       Next Steps: Go on 4/12/2022    Instructions: hospital f/u at 12:15pm    Bianka Arevalo NP   Specialty: Nephrology    [address]  [facility] [address]   Phone: [phone]       Next Steps: Go on 4/11/2022    Instructions: 2pm    Jamil Ortiz        Next Steps: Follow up on 4/28/2022    Instructions: 3:30pm        Patricia Mora LCSW   PRN

## 2022-04-06 NOTE — NURSING
Patient off the unit for ileoscopy. Patient has been NPO since midnight. AxOx4. Ambulated to [facility].

## 2022-04-06 NOTE — PROVATION PATIENT INSTRUCTIONS
Discharge Summary/Instructions after an Endoscopic Procedure  Patient Name: Jarrett Charlton  Patient MRN: [id number]  Patient YOB: 1950  Wednesday, April 6, 2022  Dieter Juarez MD  Dear patient,  As a result of recent federal legislation (The Federal Cures Act), you may   receive lab or pathology results from your procedure in your [facility]   account before your physician is able to contact you. Your physician or   their representative will relay the results to you with their   recommendations at their soonest availability.  Thank you,  RESTRICTIONS:  During your procedure today, you received medications for sedation.  These   medications may affect your judgment, balance and coordination.  Therefore,   for 24 hours, you have the following restrictions:   - DO NOT drive a car, operate machinery, make legal/financial decisions,   sign important papers or drink alcohol.    ACTIVITY:  Today: no heavy lifting, straining or running due to procedural   sedation/anesthesia.  The following day: return to full activity including work.  DIET:  Eat and drink normally unless instructed otherwise.     TREATMENT FOR COMMON SIDE EFFECTS:  - Mild abdominal pain, nausea, belching, bloating or excessive gas:  rest,   eat lightly and use a heating pad.  - Sore Throat: treat with throat lozenges and/or gargle with warm salt   water.  - Because air was used during the procedure, expelling large amounts of air   from your rectum or belching is normal.  - If a bowel prep was taken, you may not have a bowel movement for 1-3 days.    This is normal.  SYMPTOMS TO WATCH FOR AND REPORT TO YOUR PHYSICIAN:  1. Abdominal pain or bloating, other than gas cramps.  2. Chest pain.  3. Back pain.  4. Signs of infection such as: chills or fever occurring within 24 hours   after the procedure.  5. Rectal bleeding, which would show as bright red, maroon, or black stools.   (A tablespoon of blood from the rectum is not serious, especially if    hemorrhoids are present.)  6. Vomiting.  7. Weakness or dizziness.  GO DIRECTLY TO THE NEAREST EMERGENCY ROOM IF YOU HAVE ANY OF THE FOLLOWING:      Difficulty breathing              Chills and/or fever over 101 F   Persistent vomiting and/or vomiting blood   Severe abdominal pain   Severe chest pain   Black, tarry stools   Bleeding- more than one tablespoon   Any other symptom or condition that you feel may need urgent attention  Your doctor recommends these additional instructions:  If any biopsies were taken, your doctors clinic will contact you in 1 to 2   weeks with any results.  - Return patient to [facility] for ongoing care.   - Continue present diet  - Follow clinical course; could consider further small bowel imaging if   symptoms remain  For questions, problems or results please call your physician - Dieter Juarez MD at Work:  [phone].  [facility], EMERGENCY ROOM PHONE NUMBER: [phone]  IF A COMPLICATION OR EMERGENCY SITUATION ARISES AND YOU ARE UNABLE TO REACH   YOUR PHYSICIAN - GO DIRECTLY TO THE EMERGENCY ROOM.  Dieter Juarez MD  4/6/2022 9:32:58 AM  This report has been verified and signed electronically.  Dear patient,  As a result of recent federal legislation (The Federal Cures Act), you may   receive lab or pathology results from your procedure in your [facility]   account before your physician is able to contact you. Your physician or   their representative will relay the results to you with their   recommendations at their soonest availability.  Thank you,  PROVATION

## 2022-04-06 NOTE — TREATMENT PLAN
Brief GI treatment plan    Ileostomy performed today.  Findings no acute findings, tight but non-strictured ostomy.  Please see full endoscopy report for details.    Recommendations:    -f/u stool studies  -continue to encourage PO intake and IVF      GI will continue to follow.  Please call questions.    Roly Lindo MD  GI Fellow

## 2022-04-06 NOTE — H&P
Short Stay Endoscopy   Pre-Procedure Note    PCP - Poli Gonzalez MD  Referring Physician - Aaareferral Self  No address on file    Procedure - Endoscopy    ASA - per anesthesia  Mallampati - per anesthesia    HPI  72 y.o. male who presents for    1. GRADY (acute kidney injury)    2. High output ileostomy    3. Chest pain    4. Essential hypertension    5. Stage 3b chronic kidney disease    6. Type 2 diabetes mellitus with stage 3b chronic kidney disease, without long-term current use of insulin    7. Ileostomy in place    8. Intestinal adhesions with partial obstruction         Medical History:  has a past medical history of Basal cell carcinoma, BPH (benign prostatic hyperplasia), Carotid stenosis, Chronic kidney disease, Claudication, Coronary artery disease, DDD (degenerative disc disease) (10/21/2013), Diabetes mellitus with renal complications, Disc disease, degenerative, cervical, Encounter for blood transfusion, GERD (gastroesophageal reflux disease), Gout, chronic, History of ulcerative colitis, HLD (hyperlipidemia), HTN (hypertension), Ileostomy in place (1982), RBBB, Squamous cell carcinoma (03/08/2018), Squamous cell carcinoma (04/12/2018), and Ventricular tachycardia.    Surgical History:  has a past surgical history that includes colectomy and ileostomy (1985); Cervical fusion; Cataract extraction (Bilateral); cardiac stents; Transurethral resection of prostate (TURP) without use of laser (N/A, 1/23/2019); repair, hernia, parastomal (N/A, 11/9/2020); Lysis of adhesions (N/A, 11/9/2020); Excision of parotid gland (Left, 12/18/2020); Left heart catheterization (Right, 4/15/2021); Insertion of implantable loop recorder (06/07/2021); and Insertion of implantable loop recorder (Left, 6/7/2021).    Family History: family history includes Cancer in his father; Dementia in his mother; Diabetes in his father..    Social History:  reports that he has never smoked. He has never used smokeless tobacco. He reports  that he does not drink alcohol and does not use drugs.    Review of patient's allergies indicates:   Allergen Reactions    Crestor [rosuvastatin]     Lipitor [atorvastatin]        Medications:   Facility-Administered Medications Prior to Admission   Medication Dose Route Frequency Provider Last Rate Last Admin    leuprolide (6 month) injection 45 mg  45 mg Intramuscular Q6 Months Catarino Mota MD         Medications Prior to Admission   Medication Sig Dispense Refill Last Dose    allopurinoL (ZYLOPRIM) 300 MG tablet Take 1.5 tablets (450 mg total) by mouth once daily. (Patient taking differently: Take 450 mg by mouth every evening.) 135 tablet 3 4/4/2022 at Unknown time    amiodarone (PACERONE) 200 MG Tab Take 1 tablet (200 mg total) by mouth 2 (two) times daily.   4/3/2022 at Unknown time    amLODIPine (NORVASC) 5 MG tablet Take 5 mg by mouth once daily.   4/4/2022 at Unknown time    aspirin (ECOTRIN) 81 MG EC tablet Take 81 mg by mouth once daily.   4/4/2022 at Unknown time    brimonidine 0.2% (ALPHAGAN) 0.2 % Drop INSTILL 1 DROP INTO EACH EYE TWICE DAILY 20 mL 0 4/4/2022 at Unknown time    calcium citrate-vitamin D3 (CITRACAL + D MAXIMUM) 315 mg-6.25 mcg (250 unit) Tab Take 1 tablet by mouth once daily. 120 tablet 3 Past Week at Unknown time    carvediloL (COREG) 12.5 MG tablet Take 12.5 mg by mouth 2 (two) times daily with meals.   4/3/2022 at Unknown time    clopidogrel (PLAVIX) 75 mg tablet Take 75 mg by mouth once daily.   4/4/2022 at Unknown time    diphenoxylate-atropine 2.5-0.025 mg (LOMOTIL) 2.5-0.025 mg per tablet Take 1 tablet by mouth 4 (four) times daily.       FIBER, CALCIUM POLYCARBOPHIL, 625 mg tablet Take 3 tablets by mouth before dinner.   4/3/2022 at Unknown time    gabapentin (NEURONTIN) 600 MG tablet Take 1 tablet (600 mg total) by mouth 2 (two) times daily. 180 tablet 3 4/3/2022 at Unknown time    glimepiride (AMARYL) 4 MG tablet Take 4 mg by mouth 2 (two) times daily before  meals.   4/3/2022 at Unknown time    HYDROcodone-acetaminophen (NORCO) 5-325 mg per tablet Take 1 tablet by mouth every 12 (twelve) hours as needed for Pain. 20 tablet 0 Past Month at Unknown time    magnesium oxide (MAG-OX) 400 mg (241.3 mg magnesium) tablet Take 400 mg by mouth once daily.   4/4/2022 at Unknown time    pantoprazole (PROTONIX) 40 MG tablet Take 40 mg by mouth once daily.   4/4/2022 at Unknown time    pravastatin (PRAVACHOL) 40 MG tablet Take 1 tablet (40 mg total) by mouth once daily. (Patient taking differently: Take 80 mg by mouth once daily.) 90 tablet 3 4/4/2022 at Unknown time    ACCU-CHEK PAUL CONTROL SOLN Soln 1 drop by NOT APPLICABLE route once daily.       ACCU-CHEK SOFTCLIX LANCETS Misc 1 lancet by NOT APPLICABLE route once daily.       ALPRAZolam (XANAX) 0.5 MG tablet Take 0.5 mg by mouth nightly as needed for Anxiety.   More than a month at Unknown time    BD ALCOHOL SWABS PadM        blood sugar diagnostic (ONE TOUCH ULTRA TEST) Strp USE ONE STRIP TO CHECK GLUCOSE EVERY  each 11     droxidopa 100 mg Cap Take 2 capsules by mouth 2 (two) times a day. For Low blood pressure       fludrocortisone (FLORINEF) 0.1 mg Tab             Labs:  Lab Results   Component Value Date    WBC 4.70 04/06/2022    HGB 11.0 (L) 04/06/2022    HCT 34.2 (L) 04/06/2022     (L) 04/06/2022    CHOL 148 03/04/2022    TRIG 182 (H) 03/04/2022    HDL 39 (L) 03/04/2022    ALT 25 04/06/2022    AST 26 04/06/2022     04/06/2022    K 3.8 04/06/2022     04/06/2022    CREATININE 1.7 (H) 04/06/2022    BUN 27 (H) 04/06/2022    CO2 26 04/06/2022    TSH 3.559 03/04/2022    PSA 0.18 11/19/2020    INR 1.1 04/06/2022    HGBA1C 7.0 (H) 03/04/2022       Risks and benefits of this endoscopic procedure, including but not limited to bleeding, inflammation, infection, perforation, and death, explained to the patient prior to procedure      Dieter Juarez MD

## 2022-04-06 NOTE — ANESTHESIA POSTPROCEDURE EVALUATION
Anesthesia Post Evaluation    Patient: Jarrett Charlton Jr.    Procedure(s) Performed: Procedure(s) (LRB):  ENDOSCOPY, POUCH, SMALL INTESTINE, DIAGNOSTIC (N/A)    Final Anesthesia Type: general      Patient location during evaluation: PACU  Patient participation: Yes- Able to Participate  Level of consciousness: awake and alert  Post-procedure vital signs: reviewed and stable  Pain management: adequate  Airway patency: patent    PONV status at discharge: No PONV  Anesthetic complications: no      Cardiovascular status: blood pressure returned to baseline  Respiratory status: unassisted and spontaneous ventilation  Hydration status: euvolemic  Follow-up not needed.          Vitals Value Taken Time   /94 04/06/22 0946   Temp 36.4 °C (97.5 °F) 04/06/22 0914   Pulse 64 04/06/22 0950   Resp 12 04/06/22 0948   SpO2 100 % 04/06/22 0950   Vitals shown include unvalidated device data.      Event Time   Out of Recovery 09:30:00         Pain/Jamie Score: Jamie Score: 10 (4/6/2022  9:45 AM)

## 2022-04-06 NOTE — PLAN OF CARE
Removed iv intact. Patient tolerated well. Removed telemetry box. Patient given discharge instructions and all of his belongings. Patient provided a wheelchair to transport to hospital exit.

## 2022-04-07 LAB — O+P STL MICRO: NORMAL

## 2022-04-08 ENCOUNTER — PATIENT OUTREACH (OUTPATIENT)
Dept: ADMINISTRATIVE | Facility: OTHER | Age: 72
End: 2022-04-08
Payer: MEDICARE

## 2022-04-08 LAB
ELASTASE 1, FECAL: 276 MCG/G
FAT STL QL: NORMAL
NEUTRAL FAT STL QL: NORMAL

## 2022-04-09 LAB
BACTERIA STL CULT: NORMAL
BACTERIA STL CULT: NORMAL
E COLI SXT1 STL QL IA: NEGATIVE
E COLI SXT2 STL QL IA: NEGATIVE

## 2022-04-11 ENCOUNTER — LAB VISIT (OUTPATIENT)
Dept: LAB | Facility: HOSPITAL | Age: 72
End: 2022-04-11
Payer: MEDICARE

## 2022-04-11 ENCOUNTER — OFFICE VISIT (OUTPATIENT)
Dept: NEPHROLOGY | Facility: CLINIC | Age: 72
End: 2022-04-11
Payer: MEDICARE

## 2022-04-11 VITALS
SYSTOLIC BLOOD PRESSURE: 161 MMHG | BODY MASS INDEX: 28.04 KG/M2 | DIASTOLIC BLOOD PRESSURE: 101 MMHG | HEIGHT: 74 IN | HEART RATE: 71 BPM | OXYGEN SATURATION: 98 % | WEIGHT: 218.5 LBS

## 2022-04-11 DIAGNOSIS — N18.32 STAGE 3B CHRONIC KIDNEY DISEASE: ICD-10-CM

## 2022-04-11 DIAGNOSIS — K94.19 ALTERED BOWEL ELIMINATION DUE TO INTESTINAL OSTOMY: ICD-10-CM

## 2022-04-11 DIAGNOSIS — N17.9 AKI (ACUTE KIDNEY INJURY): ICD-10-CM

## 2022-04-11 DIAGNOSIS — N18.32 STAGE 3B CHRONIC KIDNEY DISEASE: Primary | ICD-10-CM

## 2022-04-11 DIAGNOSIS — I10 HYPERTENSION, UNSPECIFIED TYPE: ICD-10-CM

## 2022-04-11 DIAGNOSIS — E86.0 DEHYDRATION: ICD-10-CM

## 2022-04-11 LAB
ALBUMIN SERPL BCP-MCNC: 3.5 G/DL (ref 3.5–5.2)
ANION GAP SERPL CALC-SCNC: 8 MMOL/L (ref 8–16)
BUN SERPL-MCNC: 35 MG/DL (ref 8–23)
CALCIUM SERPL-MCNC: 8.9 MG/DL (ref 8.7–10.5)
CALPROTECTIN STL-MCNT: <27.1 MCG/G
CALPROTECTIN STL-MCNT: <27.1 MCG/G
CHLORIDE SERPL-SCNC: 105 MMOL/L (ref 95–110)
CO2 SERPL-SCNC: 26 MMOL/L (ref 23–29)
CREAT SERPL-MCNC: 1.9 MG/DL (ref 0.5–1.4)
EST. GFR  (AFRICAN AMERICAN): 39.6 ML/MIN/1.73 M^2
EST. GFR  (NON AFRICAN AMERICAN): 34.2 ML/MIN/1.73 M^2
GLUCOSE SERPL-MCNC: 76 MG/DL (ref 70–110)
PHOSPHATE SERPL-MCNC: 2.9 MG/DL (ref 2.7–4.5)
POTASSIUM SERPL-SCNC: 4.7 MMOL/L (ref 3.5–5.1)
SODIUM SERPL-SCNC: 139 MMOL/L (ref 136–145)

## 2022-04-11 PROCEDURE — 1159F PR MEDICATION LIST DOCUMENTED IN MEDICAL RECORD: ICD-10-PCS | Mod: CPTII,S$GLB,, | Performed by: NURSE PRACTITIONER

## 2022-04-11 PROCEDURE — 1160F PR REVIEW ALL MEDS BY PRESCRIBER/CLIN PHARMACIST DOCUMENTED: ICD-10-PCS | Mod: CPTII,S$GLB,, | Performed by: NURSE PRACTITIONER

## 2022-04-11 PROCEDURE — 3051F HG A1C>EQUAL 7.0%<8.0%: CPT | Mod: CPTII,S$GLB,, | Performed by: NURSE PRACTITIONER

## 2022-04-11 PROCEDURE — 3008F BODY MASS INDEX DOCD: CPT | Mod: CPTII,S$GLB,, | Performed by: NURSE PRACTITIONER

## 2022-04-11 PROCEDURE — 3008F PR BODY MASS INDEX (BMI) DOCUMENTED: ICD-10-PCS | Mod: CPTII,S$GLB,, | Performed by: NURSE PRACTITIONER

## 2022-04-11 PROCEDURE — 1126F AMNT PAIN NOTED NONE PRSNT: CPT | Mod: CPTII,S$GLB,, | Performed by: NURSE PRACTITIONER

## 2022-04-11 PROCEDURE — 3288F PR FALLS RISK ASSESSMENT DOCUMENTED: ICD-10-PCS | Mod: CPTII,S$GLB,, | Performed by: NURSE PRACTITIONER

## 2022-04-11 PROCEDURE — 99999 PR PBB SHADOW E&M-EST. PATIENT-LVL V: ICD-10-PCS | Mod: PBBFAC,,, | Performed by: NURSE PRACTITIONER

## 2022-04-11 PROCEDURE — 99999 PR PBB SHADOW E&M-EST. PATIENT-LVL V: CPT | Mod: PBBFAC,,, | Performed by: NURSE PRACTITIONER

## 2022-04-11 PROCEDURE — 3077F SYST BP >= 140 MM HG: CPT | Mod: CPTII,S$GLB,, | Performed by: NURSE PRACTITIONER

## 2022-04-11 PROCEDURE — 80069 RENAL FUNCTION PANEL: CPT | Performed by: NURSE PRACTITIONER

## 2022-04-11 PROCEDURE — 3080F PR MOST RECENT DIASTOLIC BLOOD PRESSURE >= 90 MM HG: ICD-10-PCS | Mod: CPTII,S$GLB,, | Performed by: NURSE PRACTITIONER

## 2022-04-11 PROCEDURE — 3066F PR DOCUMENTATION OF TREATMENT FOR NEPHROPATHY: ICD-10-PCS | Mod: CPTII,S$GLB,, | Performed by: NURSE PRACTITIONER

## 2022-04-11 PROCEDURE — 99214 PR OFFICE/OUTPT VISIT, EST, LEVL IV, 30-39 MIN: ICD-10-PCS | Mod: S$GLB,,, | Performed by: NURSE PRACTITIONER

## 2022-04-11 PROCEDURE — 1126F PR PAIN SEVERITY QUANTIFIED, NO PAIN PRESENT: ICD-10-PCS | Mod: CPTII,S$GLB,, | Performed by: NURSE PRACTITIONER

## 2022-04-11 PROCEDURE — 1101F PT FALLS ASSESS-DOCD LE1/YR: CPT | Mod: CPTII,S$GLB,, | Performed by: NURSE PRACTITIONER

## 2022-04-11 PROCEDURE — 36415 COLL VENOUS BLD VENIPUNCTURE: CPT | Performed by: NURSE PRACTITIONER

## 2022-04-11 PROCEDURE — 3077F PR MOST RECENT SYSTOLIC BLOOD PRESSURE >= 140 MM HG: ICD-10-PCS | Mod: CPTII,S$GLB,, | Performed by: NURSE PRACTITIONER

## 2022-04-11 PROCEDURE — 3080F DIAST BP >= 90 MM HG: CPT | Mod: CPTII,S$GLB,, | Performed by: NURSE PRACTITIONER

## 2022-04-11 PROCEDURE — 1159F MED LIST DOCD IN RCRD: CPT | Mod: CPTII,S$GLB,, | Performed by: NURSE PRACTITIONER

## 2022-04-11 PROCEDURE — 1160F RVW MEDS BY RX/DR IN RCRD: CPT | Mod: CPTII,S$GLB,, | Performed by: NURSE PRACTITIONER

## 2022-04-11 PROCEDURE — 3051F PR MOST RECENT HEMOGLOBIN A1C LEVEL 7.0 - < 8.0%: ICD-10-PCS | Mod: CPTII,S$GLB,, | Performed by: NURSE PRACTITIONER

## 2022-04-11 PROCEDURE — 99214 OFFICE O/P EST MOD 30 MIN: CPT | Mod: S$GLB,,, | Performed by: NURSE PRACTITIONER

## 2022-04-11 PROCEDURE — 3066F NEPHROPATHY DOC TX: CPT | Mod: CPTII,S$GLB,, | Performed by: NURSE PRACTITIONER

## 2022-04-11 PROCEDURE — 1101F PR PT FALLS ASSESS DOC 0-1 FALLS W/OUT INJ PAST YR: ICD-10-PCS | Mod: CPTII,S$GLB,, | Performed by: NURSE PRACTITIONER

## 2022-04-11 PROCEDURE — 3288F FALL RISK ASSESSMENT DOCD: CPT | Mod: CPTII,S$GLB,, | Performed by: NURSE PRACTITIONER

## 2022-04-11 NOTE — PROGRESS NOTES
"  Subjective:       Patient ID: Jarrett Charlton Jr. is a 72 y.o. White male who presents for follow-up evaluation of   CKD, hyperkalemia    HPI    Last seen by me in May 2021. Previously seen by Dr. Lyon in Aug 2019.      Patient presents for f/u of CKD.  Baseline creatinine had been 1.3-1.5 c frequent increases to 1.6-1.8 range an occasional AKIs c higher sCr. Had GRADY in late December 2019 c peak sCr of 2.3 mg/dL 2/2 volume depletion from high output in ileostomy. Has had hypomagnesemia in the past.  Had K of 5.9 in November; provided with K finder. Since December 2019, new sCr baseline 1.4-1.6 mg/dL. Has been 2.0-2.1 since May 2021.    Home BPs: high in morning; 15 minutes after waking it decreases and is "low all day" until about 5 p.m., it goes up, then he takes his medications.  Recent loop recorder implanted. Has been getting IVF weekly to every other week by cardiologist at [facility]. Also recently started Florinef by PCP.    Heart cath on 4/15/21; no significant blockages. Was sent to [facility] and told he needs a pacemaker. Says this is scheduled 5/13. Has been told to be taking pedialyte; still with diarrhea occasionally but much improved overall.    Significant hx includes Hypertension for 5 years, DM for 3 yrs, gout, ulcerative colitis with ostomy and hyperlipidemia, prostate cancer    The patient denies taking NSAIDs.  Had IV contrast with cardiologist for MRI on 4/3/2020.     Social history: retired, took care of wheelchair-bound wife, but she passed away in July 2020  Significant family hx includes: no known kidney disease in family.    Last renal US: Sept 2019, reviewed. Simple left renal cyst.    Update August 2021:  Colostomy output still more solid "oatmeal" texture with water. Takes immodium if output is more liquid.  Still getting IVF infusions every other week by cardiology.  No recent syncopal episodes.  Home BPs: occasionally SBP dropping to 100; takes BP prior to taking meds and holds meds " "if needed.  Did not take meds yet today because pressure was low today.  Now on Northera for low BP per cardiology.    Denies NSAIDs; no recent hospitalizations.     Update December 2021:   Returns for f/u of CKD.  Baseline sCr 1.7-1.8 mg/dL.  Home BPs: 130-160/60-90; occasionally drops to SBP of high 80s and takes midodrine   Feels dizzy and lightheaded when blood glucose drops below 80  Denies NSAIDs; no recent hospitalizations.   Diarrhea now being treated by Dr. Baum as IBS. Viberzi was just approved but has not been started yet. Still with intermittent diarrhea. Still getting IVF per cardiology every other week.    Update 4/4/22:  Returns for f/u of CKD.  Baseline sCr 1.7-1.8 mg/dL.  Recent sCr 1.3-1.5 during admission for small bowel obstruction March 9-March 11.  Still gets IVF c Dr. Dao (cardiology) q 2 weeks. Dr. Dao is moving to [place], so pt may not be able to drive for this every two weeks. Next IVF is scheduled for 4/15/22.  Home BPs: labile at home. He takes Wendy if SBP gets as low as 110 and he takes his antihypertensives when SBP gets to 130-140. He took his antihypertensive medication while in the office today.    Update 4/11/22:  Returns for f/u of CKD.  Baseline sCr 1.7-1.8 mg/dL.  After appt on 4/4, pt's sCr returned at 3.0. Sent to ED. He was given IVF and had ileoscopy. sCr returned to previous baseline prior to discharge on 4/6.    Had IVF infusion on Friday 4/8/22 c cardiology. Had "bad" diarrhea on Saturday morning. Took immodium. He also vomited x 3. He became very weak and could not get off the bathroom floor, where he was sitting to be near toilet. Eventually he regained strength and was able to get up. He did not fall.  He has been drinking plenty of fluid and electrolyte fluids since that time. Stool output has returned to normal. No more vomiting. Says he feels "great" now.    Said he did a tilt table test years ago (before blood pressure issues), and it was normal at that " "time. Says drifts to the side when he walks. Says he gets dizzy if he stands up too fast.      Review of Systems   Cardiovascular:  Denies swelling to legs.  Gastrointestinal:  Stool output at baseline currently.  Genitourinary: Negative for difficulty urinating, dysuria, hematuria.          Objective:       Blood pressure (!) 161/101, pulse 71, height 6' 2" (1.88 m), weight 99.1 kg (218 lb 7.6 oz), SpO2 98 %.  Physical Exam  Vitals signs reviewed.   Constitutional:       General: He is not in acute distress.     Appearance: Normal appearance. He is well-developed. He is not ill-appearing or diaphoretic.   Cardiac: normal rate  Pulmonary:  NAD    Skin:     General: Skin is warm and dry.   Some brown/red discoloration to BLE  Neurological:      Mental Status: He is alert and oriented to person, place, and time.   Psychiatric:         Mood and Affect: Mood normal.         Behavior: Behavior normal.         Thought Content: Thought content normal.         Judgment: Judgment normal.         Lab Results   Component Value Date    CREATININE 1.7 (H) 04/06/2022    URICACID 3.8 09/29/2021     Prot/Creat Ratio, Urine   Date Value Ref Range Status   03/04/2022 0.09 0.00 - 0.20 Final   12/16/2021 0.09 0.00 - 0.20 Final   10/29/2021 0.09 0.00 - 0.20 Final     Lab Results   Component Value Date     04/06/2022    K 3.8 04/06/2022    CO2 26 04/06/2022     04/06/2022     Lab Results   Component Value Date    PTH 97.5 (H) 03/04/2022    CALCIUM 8.6 (L) 04/06/2022    PHOS 2.8 04/06/2022     Lab Results   Component Value Date    HGB 11.0 (L) 04/06/2022    WBC 4.70 04/06/2022    HCT 34.2 (L) 04/06/2022      Lab Results   Component Value Date    HGBA1C 7.0 (H) 03/04/2022     (L) 04/06/2022    BUN 27 (H) 04/06/2022     Lab Results   Component Value Date    LDLCALC 72.6 03/04/2022         Outside labs 10/24/19  Crt: 1.5 mg/dL  Hgb: 15.2 g/dL  CO2: 20 mmol/L  K: 5.9 mmol/L  A1c: 7.1%    Assessment:       1. Stage 3b " chronic kidney disease    2. Hypertension, unspecified type    3. Dehydration    4. Altered bowel elimination due to intestinal ostomy    5. GRADY (acute kidney injury)        Plan:     CKD stage 3B c eGFR 43-50 mL/min- 2/2 age-related nephron loss, HTN, previous NSAID use for gout, volume depletion episodes c high ostomy output. Stable; non-proteinuric.  No longer using NSAIDs;  At risk for progression given frequent AKIs. On PPI.     Gets IVF q 2 weeks per his cardiologist, Dr. Dao, at [facility]. Pt says Dr. Dao will be relocating to [place], and he is unsure if he will be able to drive there q 2 weeks for IVF.  I called and spoke to Dr. Dao's nurse, Emilia, who administers the IVF to Mr. Charlton. She said they typically give 1 L NS q 2 weeks. If he has recently been hospitalized or is feeling more dehydrated, they will sometimes receive orders for 2 L NS.    Recent GRADY back to baseline, but had massive GI output this weekend. Repeat RFP today. May have GRADY, but has been able to hydrate well the past few days.     UPCR No significant proteinuria. Lisinopril previously d/c to help decrease risk of frequent AKIs.     Acid-base Low serum bicarb x 1 during hospitalization.    Renal osteodystrophy PTH mildly elevated. Ca WNL. On ergocalciferol.   Anemia At goal for CKD   DM At goal for CKD.   Lipid Management On statin per PCP     HTN - High today.  Cardiology typically adjusts medications. On amiodarone 200 mg daily, amlodipine 2.5 mg vs. 5 mg On Northera.  - Recommend that SBP be maintained over 110  - Had had fewer hypotensive episodes recently  - recommend consideration of tilt table test    Hyperkalemia - WNL at last check    All questions patient had were answered.  Asked if further questions. None. F/u in 3 mos with labs and urine prior to next visit or sooner if needed.  ER for emergency concerns.    Note: Aniceto participant; took 6 mos survey previously.    Summary of Plan:  1. RFP  today  2. avoid NSAID/ bactrim/ IV contrast/ gadolinium/ aminoglycoside where possible  3. 1 L NS IVF q 2 weeks, starting 4/29/22  4. RTC in 3 mos

## 2022-04-12 ENCOUNTER — TELEPHONE (OUTPATIENT)
Dept: PRIMARY CARE CLINIC | Facility: CLINIC | Age: 72
End: 2022-04-12
Payer: MEDICARE

## 2022-04-12 ENCOUNTER — OFFICE VISIT (OUTPATIENT)
Dept: PRIMARY CARE CLINIC | Facility: CLINIC | Age: 72
End: 2022-04-12
Payer: MEDICARE

## 2022-04-12 VITALS
HEIGHT: 74 IN | DIASTOLIC BLOOD PRESSURE: 84 MMHG | WEIGHT: 213.88 LBS | RESPIRATION RATE: 16 BRPM | SYSTOLIC BLOOD PRESSURE: 132 MMHG | OXYGEN SATURATION: 95 % | BODY MASS INDEX: 27.45 KG/M2 | HEART RATE: 67 BPM

## 2022-04-12 DIAGNOSIS — N18.32 TYPE 2 DIABETES MELLITUS WITH STAGE 3B CHRONIC KIDNEY DISEASE, WITHOUT LONG-TERM CURRENT USE OF INSULIN: ICD-10-CM

## 2022-04-12 DIAGNOSIS — I10 ESSENTIAL HYPERTENSION: ICD-10-CM

## 2022-04-12 DIAGNOSIS — E11.22 TYPE 2 DIABETES MELLITUS WITH STAGE 3B CHRONIC KIDNEY DISEASE, WITHOUT LONG-TERM CURRENT USE OF INSULIN: ICD-10-CM

## 2022-04-12 DIAGNOSIS — N18.32 CHRONIC RENAL IMPAIRMENT, STAGE 3B: ICD-10-CM

## 2022-04-12 DIAGNOSIS — K91.1 DUMPING SYNDROME: Primary | ICD-10-CM

## 2022-04-12 PROCEDURE — 3008F PR BODY MASS INDEX (BMI) DOCUMENTED: ICD-10-PCS | Mod: CPTII,S$GLB,, | Performed by: INTERNAL MEDICINE

## 2022-04-12 PROCEDURE — 1126F AMNT PAIN NOTED NONE PRSNT: CPT | Mod: CPTII,S$GLB,, | Performed by: INTERNAL MEDICINE

## 2022-04-12 PROCEDURE — 1160F RVW MEDS BY RX/DR IN RCRD: CPT | Mod: CPTII,S$GLB,, | Performed by: INTERNAL MEDICINE

## 2022-04-12 PROCEDURE — 3288F PR FALLS RISK ASSESSMENT DOCUMENTED: ICD-10-PCS | Mod: CPTII,S$GLB,, | Performed by: INTERNAL MEDICINE

## 2022-04-12 PROCEDURE — 99214 OFFICE O/P EST MOD 30 MIN: CPT | Mod: S$GLB,,, | Performed by: INTERNAL MEDICINE

## 2022-04-12 PROCEDURE — 1126F PR PAIN SEVERITY QUANTIFIED, NO PAIN PRESENT: ICD-10-PCS | Mod: CPTII,S$GLB,, | Performed by: INTERNAL MEDICINE

## 2022-04-12 PROCEDURE — 3075F PR MOST RECENT SYSTOLIC BLOOD PRESS GE 130-139MM HG: ICD-10-PCS | Mod: CPTII,S$GLB,, | Performed by: INTERNAL MEDICINE

## 2022-04-12 PROCEDURE — 3079F PR MOST RECENT DIASTOLIC BLOOD PRESSURE 80-89 MM HG: ICD-10-PCS | Mod: CPTII,S$GLB,, | Performed by: INTERNAL MEDICINE

## 2022-04-12 PROCEDURE — 1159F PR MEDICATION LIST DOCUMENTED IN MEDICAL RECORD: ICD-10-PCS | Mod: CPTII,S$GLB,, | Performed by: INTERNAL MEDICINE

## 2022-04-12 PROCEDURE — 3008F BODY MASS INDEX DOCD: CPT | Mod: CPTII,S$GLB,, | Performed by: INTERNAL MEDICINE

## 2022-04-12 PROCEDURE — 99999 PR PBB SHADOW E&M-EST. PATIENT-LVL V: ICD-10-PCS | Mod: PBBFAC,,, | Performed by: INTERNAL MEDICINE

## 2022-04-12 PROCEDURE — 3066F NEPHROPATHY DOC TX: CPT | Mod: CPTII,S$GLB,, | Performed by: INTERNAL MEDICINE

## 2022-04-12 PROCEDURE — 1159F MED LIST DOCD IN RCRD: CPT | Mod: CPTII,S$GLB,, | Performed by: INTERNAL MEDICINE

## 2022-04-12 PROCEDURE — 3066F PR DOCUMENTATION OF TREATMENT FOR NEPHROPATHY: ICD-10-PCS | Mod: CPTII,S$GLB,, | Performed by: INTERNAL MEDICINE

## 2022-04-12 PROCEDURE — 3051F HG A1C>EQUAL 7.0%<8.0%: CPT | Mod: CPTII,S$GLB,, | Performed by: INTERNAL MEDICINE

## 2022-04-12 PROCEDURE — 3075F SYST BP GE 130 - 139MM HG: CPT | Mod: CPTII,S$GLB,, | Performed by: INTERNAL MEDICINE

## 2022-04-12 PROCEDURE — 99214 PR OFFICE/OUTPT VISIT, EST, LEVL IV, 30-39 MIN: ICD-10-PCS | Mod: S$GLB,,, | Performed by: INTERNAL MEDICINE

## 2022-04-12 PROCEDURE — 99999 PR PBB SHADOW E&M-EST. PATIENT-LVL V: CPT | Mod: PBBFAC,,, | Performed by: INTERNAL MEDICINE

## 2022-04-12 PROCEDURE — 1101F PR PT FALLS ASSESS DOC 0-1 FALLS W/OUT INJ PAST YR: ICD-10-PCS | Mod: CPTII,S$GLB,, | Performed by: INTERNAL MEDICINE

## 2022-04-12 PROCEDURE — 3079F DIAST BP 80-89 MM HG: CPT | Mod: CPTII,S$GLB,, | Performed by: INTERNAL MEDICINE

## 2022-04-12 PROCEDURE — 1160F PR REVIEW ALL MEDS BY PRESCRIBER/CLIN PHARMACIST DOCUMENTED: ICD-10-PCS | Mod: CPTII,S$GLB,, | Performed by: INTERNAL MEDICINE

## 2022-04-12 PROCEDURE — 3051F PR MOST RECENT HEMOGLOBIN A1C LEVEL 7.0 - < 8.0%: ICD-10-PCS | Mod: CPTII,S$GLB,, | Performed by: INTERNAL MEDICINE

## 2022-04-12 PROCEDURE — 3288F FALL RISK ASSESSMENT DOCD: CPT | Mod: CPTII,S$GLB,, | Performed by: INTERNAL MEDICINE

## 2022-04-12 PROCEDURE — 1101F PT FALLS ASSESS-DOCD LE1/YR: CPT | Mod: CPTII,S$GLB,, | Performed by: INTERNAL MEDICINE

## 2022-04-12 RX ORDER — DIPHENOXYLATE HYDROCHLORIDE AND ATROPINE SULFATE 2.5; .025 MG/1; MG/1
1 TABLET ORAL 3 TIMES DAILY PRN
Qty: 60 TABLET | Refills: 1 | Status: SHIPPED | OUTPATIENT
Start: 2022-04-12 | End: 2022-04-12

## 2022-04-12 RX ORDER — DIPHENOXYLATE HYDROCHLORIDE AND ATROPINE SULFATE 2.5; .025 MG/1; MG/1
1 TABLET ORAL 3 TIMES DAILY PRN
Qty: 60 TABLET | Refills: 1 | Status: SHIPPED | OUTPATIENT
Start: 2022-04-12 | End: 2022-04-22

## 2022-04-12 NOTE — TELEPHONE ENCOUNTER
Spoke with pt. Notified MD had sent the rx to mail order by accident, pended rx for [facility] in [place] for lomotil

## 2022-04-12 NOTE — TELEPHONE ENCOUNTER
----- Message from Camille Bonner sent at 4/12/2022  2:53 PM CDT -----  Contact: Self [phone]  Pt requesting a call back in regards to an RX for diarrhea stated currently at pharm waiting for RX.      [address] [place] [facility] [address] [facility]  [address] [facility]  [place] [address]  Phone: [phone] Fax: [phone]    Please call and advise

## 2022-04-12 NOTE — PROGRESS NOTES
Subjective:       Patient ID: Jarrett Charlton Jr. is a 72 y.o. male.    Chief Complaint: Hospital Follow Up    HPI  Pt visit today for f/u from hospital he was admitted again for dehydration from high output eliostomy with diarrhea his creatinine was elevated but improving with hydration IVF back to bas line he denies fever chill abd pain no n/v/d no sob cp WILSON  Pt is feeling better no wback to his base line condition  Review of Systems    Objective:      Physical Exam  Vitals and nursing note reviewed.   Constitutional:       General: He is not in acute distress.     Appearance: He is well-developed.   HENT:      Head: Normocephalic and atraumatic.      Right Ear: External ear normal.      Left Ear: External ear normal.      Nose: Nose normal.      Mouth/Throat:      Pharynx: No oropharyngeal exudate.   Eyes:      Extraocular Movements: Extraocular movements intact.      Conjunctiva/sclera: Conjunctivae normal.      Pupils: Pupils are equal, round, and reactive to light.   Neck:      Thyroid: No thyromegaly.   Cardiovascular:      Rate and Rhythm: Normal rate and regular rhythm.      Heart sounds: Normal heart sounds. No murmur heard.    No friction rub. No gallop.   Pulmonary:      Effort: Pulmonary effort is normal. No respiratory distress.      Breath sounds: Normal breath sounds. No wheezing or rales.   Chest:      Chest wall: No tenderness.   Abdominal:      General: Bowel sounds are normal. There is no distension.      Palpations: Abdomen is soft.      Tenderness: There is no abdominal tenderness.      Comments: Ileostomy with semi form stool no abd pain no distension   Musculoskeletal:         General: No tenderness or deformity. Normal range of motion.      Cervical back: Normal range of motion and neck supple.   Lymphadenopathy:      Cervical: No cervical adenopathy.   Skin:     General: Skin is warm and dry.      Capillary Refill: Capillary refill takes less than 2 seconds.      Findings: No erythema or  rash.   Neurological:      Mental Status: He is alert and oriented to person, place, and time.   Psychiatric:         Thought Content: Thought content normal.         Judgment: Judgment normal.         Assessment:       1. Dumping syndrome    2. Type 2 diabetes mellitus with stage 3b chronic kidney disease, without long-term current use of insulin    3. Chronic renal impairment, stage 3b    4. Essential hypertension        Plan:       Dumping syndrome  Comments:  currently stable will try lomotil around clock stop if stool become firm   Orders:  -     Discontinue: diphenoxylate-atropine 2.5-0.025 mg (LOMOTIL) 2.5-0.025 mg per tablet; Take 1 tablet by mouth 3 (three) times daily as needed for Diarrhea.  Dispense: 60 tablet; Refill: 1  -     diphenoxylate-atropine 2.5-0.025 mg (LOMOTIL) 2.5-0.025 mg per tablet; Take 1 tablet by mouth 3 (three) times daily as needed for Diarrhea.  Dispense: 60 tablet; Refill: 1    Type 2 diabetes mellitus with stage 3b chronic kidney disease, without long-term current use of insulin  Comments:  stable Hgba1c 7.0  Orders:  -     Basic Metabolic Panel; Future; Expected date: 04/12/2022    Chronic renal impairment, stage 3b  Comments:  back to base line with hydration  Orders:  -     Basic Metabolic Panel; Future; Expected date: 04/12/2022    Essential hypertension  Comments:  stable continue [facility] tx        Medication List with Changes/Refills   New Medications    DIPHENOXYLATE-ATROPINE 2.5-0.025 MG (LOMOTIL) 2.5-0.025 MG PER TABLET    Take 1 tablet by mouth 3 (three) times daily as needed for Diarrhea.   Current Medications    ACCU-CHEK PAUL CONTROL SOLN SOLN    1 drop by NOT APPLICABLE route once daily.    ACCU-CHEK SOFTCLIX LANCETS MISC    1 lancet by NOT APPLICABLE route once daily.    ALLOPURINOL (ZYLOPRIM) 300 MG TABLET    Take 1.5 tablets (450 mg total) by mouth once daily.    ALPRAZOLAM (XANAX) 0.5 MG TABLET    Take 0.5 mg by mouth nightly as needed for Anxiety.    AMIODARONE  (PACERONE) 200 MG TAB    Take 1 tablet (200 mg total) by mouth 2 (two) times daily.    AMLODIPINE (NORVASC) 5 MG TABLET    Take 5 mg by mouth once daily.    ASPIRIN (ECOTRIN) 81 MG EC TABLET    Take 81 mg by mouth once daily.    BD ALCOHOL SWABS PADM        BLOOD SUGAR DIAGNOSTIC (ONE TOUCH ULTRA TEST) STRP    USE ONE STRIP TO CHECK GLUCOSE EVERY DAY    BRIMONIDINE 0.2% (ALPHAGAN) 0.2 % DROP    INSTILL 1 DROP INTO EACH EYE TWICE DAILY    CALCIUM CITRATE-VITAMIN D3 (CITRACAL + D MAXIMUM) 315 MG-6.25 MCG (250 UNIT) TAB    Take 1 tablet by mouth once daily.    CARVEDILOL (COREG) 12.5 MG TABLET    Take 12.5 mg by mouth 2 (two) times daily with meals.    CLOPIDOGREL (PLAVIX) 75 MG TABLET    Take 75 mg by mouth once daily.    DROXIDOPA 100 MG CAP    Take 2 capsules by mouth 2 (two) times a day. For Low blood pressure    FIBER, CALCIUM POLYCARBOPHIL, 625 MG TABLET    Take 3 tablets by mouth before dinner.    FLUDROCORTISONE (FLORINEF) 0.1 MG TAB        GABAPENTIN (NEURONTIN) 600 MG TABLET    Take 1 tablet (600 mg total) by mouth 2 (two) times daily.    GLIMEPIRIDE (AMARYL) 4 MG TABLET    Take 4 mg by mouth 2 (two) times daily before meals.    HYDROCODONE-ACETAMINOPHEN (NORCO) 5-325 MG PER TABLET    Take 1 tablet by mouth every 12 (twelve) hours as needed for Pain.    MAGNESIUM OXIDE (MAG-OX) 400 MG (241.3 MG MAGNESIUM) TABLET    Take 400 mg by mouth once daily.    PANTOPRAZOLE (PROTONIX) 40 MG TABLET    Take 40 mg by mouth once daily.    PRAVASTATIN (PRAVACHOL) 40 MG TABLET    Take 1 tablet (40 mg total) by mouth once daily.   Discontinued Medications    DIPHENOXYLATE-ATROPINE 2.5-0.025 MG (LOMOTIL) 2.5-0.025 MG PER TABLET    Take 1 tablet by mouth 4 (four) times daily.

## 2022-04-13 LAB
H PYLORI AG STL QL IA: NOT DETECTED
SPECIMEN SOURCE: NORMAL

## 2022-04-13 NOTE — DISCHARGE SUMMARY
"[facility] [address])  [facility] Medicine  Discharge Summary      Patient Name: Jarrett Charlton Jr.  MRN: [id number]  Patient Class: OP- Observation  Admission Date: 4/4/2022  Hospital Length of Stay: 0 days  Discharge Date and Time: 4/6/2022 12:10 PM  Attending Physician: No att. providers found   Discharging Provider: Susan Frey PA-C  Primary Care Provider: Poli Gonzalez MD  [facility] Medicine Team: [facility] Susan Frey PA-C    HPI:   72-year-old white man history ulcerative colitis status post colectomy in 1985 with ileostomy, type 2 diabetes not on insulin, hypertension, CAD, CKD, gout, he is referred for hospital admission from the emergency department for concerns of acute kidney injury.    He was admitted to [facility] March 9 to March 11th after presenting with abdominal distension was found with concerns for partial bowel obstruction, managed with NG tube placement and decompression with improvement in symptoms and ultimately discharged home.  He states his cardiologist Dr. Dao has not come to clinic every other week on Fridays to receive intermittent IV fluid administrations usually 1 L fluids (presumed normal saline).  He reports Dr. Dao will no longer be be able to see him in the coming weeks.  He was states also seen in the [facility] emergency department on April 1st Friday but there is no encounter in epic that matches this date.    He has had recent history of increased Ostomy output, states normal for him would be emptying the ostomy every 2 hours, he even wakes up n1esytm to empty ostomy.  When he has diarrhea, describes having liquid output "looks like pea soup" which may require emptying ostomy every 3-4 times per hour when it occurs.  He was seen by surgeon and instructed to take Fiber pills, he is taking 3 Fiber pills in the evening but doesn't note significant improvement.  He has been prescribed Lo-Motil before, states its not " Patient (s)  given copy of dc instructions and 0 paper script(s) and 2 electronic scripts. Patient (s)  verbalized understanding of instructions and script (s). Patient given a current medication reconciliation form and verbalized understanding of their medications. Patient (s) verbalized understanding of the importance of discussing medications with  his or her physician or clinic they will be following up with. Patient alert and oriented and in no acute distress. Patient offered wheelchair from treatment area to hospital entrance, patient denies wheelchair. very effective, he does take Liquid Immodium (loperamide) when he has high output from ostomy.     Seen in nephrology clinic today and noted to have Cr of 3.0 on labs, up from baseline level (1.3-1.5).  he denies any NSAID use, no other new medications.  On interview today c/o of some light headed and blurred vision feeling - as he is diabetic, took AM meds including Glimeperide but has eaten very little today.   He denies any acute abdominal pain or distension today    ED workup included CT abdomen demonstrates - no concern for acute obstruction - improved/resolution of gaseous distension noted on recent abdominal imaging studies.     ED Treatment - 1 Liter LR       Procedure(s) (LRB):  ENDOSCOPY, POUCH, SMALL INTESTINE, DIAGNOSTIC (N/A)      Hospital Course:   Patient admitted to observation for acute kidney injury. Cr 2.5 --> 2.1 after receiving LR infusion overnight. GI consulted and recommend stool studies, continuous fluids, and ileoscopy. Ileoscopy without acute findings and diarrhea resolved with psyllium. Stool studies negative. Cr 1.7 after receiving LR infusion x2 nights. Stable for discharge with GI, PCP, and nephrology follow-up. Return precautions given and patient verbalized understanding.        Goals of Care Treatment Preferences:  Code Status: Full Code      Consults:   Consults (From admission, onward)        Status Ordering Provider     Inpatient consult to Gastroenterology  Once        Provider:  (Not yet assigned)    Completed FAVIAN FERRERA          * GRADY (acute kidney injury)  Improved: Cr 2.5 --> 2.1 --> 1.7  - patient with presumed Pre-renal GRADY secondary to hypovolemia, pt with recent hx of high output from Ostomy  - Reports his cardiologist (Dr. Dao) will likely be unable to continue administering outpatient IV fludis - discussed with patient if he may require HH with possible PICC line in future for intermittent IVF and he plans to discuss this with his PCP and nephrologist  - no  bleeding from ostomy, no nausea/vomiting and abdominal pain - CT scan shows improved bowel distension present on recent Upper GI with small bowel follow through.   - s/p 1L of LR.  Continue IV fluids overnight - 200cc/hr LR x 10 hours.    - No overt metablic acidosis that might be seen with High output ostomy  - Psyllium and prn loperamide with resolution of diarrhea  - GI consult to evaluate if further medical therapy to modify attenuate ostomy outupt.    - plan for ileoscopy on 4/6, negative for acute findings   - stool studies negative, continue LR infusion at 200 cc/hr x 10 hours  - referral placed to nephrology, GI, and PCP at discharge      High output ileostomy  History of ulcerative colitis    As per GRADY  -CT scan rules out obstruction.       Essential hypertension  - Continue home medications as appropriate    Type 2 diabetes mellitus with diabetic polyneuropathy, without long-term current use of insulin  - continue Gabapentin  Lab Results   Component Value Date    HGBA1C 7.0 (H) 03/04/2022         CAD (coronary artery disease)  - Continue clopidogrel, statin      Idiopathic chronic gout of multiple sites with tophus  - Continue chronic allopurinol - may have to renally adjust dose.       Type 2 diabetes mellitus with stage 3 chronic kidney disease, without long-term current use of insulin  - Resume amaryl BID at home          Final Active Diagnoses:    Diagnosis Date Noted POA    PRINCIPAL PROBLEM:  GRADY (acute kidney injury) [N17.9] 12/05/2017 Yes    High output ileostomy [R19.8, Z93.2] 02/04/2019 Not Applicable    Essential hypertension [I10]  Yes    Type 2 diabetes mellitus with diabetic polyneuropathy, without long-term current use of insulin [E11.42] 09/30/2020 Yes    CAD (coronary artery disease) [I25.10] 11/10/2017 Yes    Type 2 diabetes mellitus with stage 3 chronic kidney disease, without long-term current use of insulin [E11.22, N18.30]  Yes    Idiopathic chronic gout of multiple sites with  clifton [M1A.09X1]  Yes    History of ulcerative colitis [Z87.19]  Not Applicable      Problems Resolved During this Admission:       Discharged Condition: good    Disposition: Home or Self Care    Follow Up:   Follow-up Information     Poli Gonzalez MD. Go on 4/12/2022.    Specialties: Internal Medicine, Pediatrics  Why: hospital f/u at 12:15pm  Contact information:  [address] [place] [address]  [phone]             Bianka P Irina, NP. Go on 4/11/2022.    Specialty: Nephrology  Why: 2pm  Contact information:  [address]  [facility] [address]  [phone]             Jamil Ortiz Follow up on 4/28/2022.    Why: 3:30pm                     Patient Instructions:      Ambulatory referral/consult to Outpatient Case Management   Referral Priority: Routine Referral Type: Consultation   Referral Reason: Specialty Services Required   Number of Visits Requested: 1     Ambulatory referral/consult to Gastroenterology   Standing Status: Future   Referral Priority: Urgent Referral Type: Consultation   Referral Reason: Specialty Services Required   Requested Specialty: Gastroenterology   Number of Visits Requested: 1     Ambulatory referral/consult to Nephrology   Standing Status: Future   Referral Priority: Urgent Referral Type: Consultation   Referral Reason: Specialty Services Required   Referred to Provider: BIANKA ELKINS Requested Specialty: Nephrology   Number of Visits Requested: 1     Diet diabetic     Notify your health care provider if you experience any of the following:  persistent nausea and vomiting or diarrhea     Notify your health care provider if you experience any of the following:  persistent dizziness, light-headedness, or visual disturbances     Activity as tolerated       Significant Diagnostic Studies: Radiology: CT scan: CT ABDOMEN PELVIS WITHOUT CONTRAST:   Results for orders placed or performed during the hospital encounter of 04/04/22   CT Abdomen Pelvis  Without  Contrast    Narrative    EXAMINATION:  CT ABDOMEN PELVIS WITHOUT CONTRAST    CLINICAL HISTORY:  Abdominal pain, acute, nonlocalized;    TECHNIQUE:  Low dose axial images, sagittal and coronal reformations were obtained from the lung bases to the pubic symphysis.  Oral contrast was not administered.    COMPARISON:  CT 09/10/2021    FINDINGS:  Images of the lower thorax are remarkable for bilateral dependent atelectasis/scarring.  There is left pleural thickening, similar to the previous examination.  There is calcification in the distribution of the coronary arteries.    The liver, spleen and adrenal glands have a grossly unremarkable noncontrast appearance.  There is atrophic change of the pancreas without pancreatic ductal dilation.  The gallbladder is unremarkable.  No significant biliary dilation.  The stomach is decompressed without wall thickening.  Surgical changes are noted of the anterior aspect of the abdomen.    There is no hydronephrosis or nephrolithiasis.  Several low attenuating lesions arise from the left kidney, the majority of which are too small for characterization.  Some of which measure attenuation suggesting cysts, others measure higher attenuation than would be expected for simple cysts.  The bilateral ureters are unable to be followed in their entirety to the urinary bladder, no definite calculi seen.  The urinary bladder is decompressed.  The prostate is not enlarged.    Surgical changes are noted of colectomy.  There is end ileostomy.  The small bowel is grossly unremarkable noting a few small bowel loops are closely tethered to the anterior abdominal wall without obstruction.  There is slight induration within the anterior aspect of the mesentery, in comparison to the previous examination, this has decreased since that time.  Additionally, the degree of bowel distension has also decreased since previous exam noting essentially decompressed small bowel on current exam.  No focal organized  fluid collection.  Several scattered shotty periaortic and paracaval lymph nodes are noted.    There are bilateral fat containing inguinal hernias, left greater than right.  There is osteopenia.  Degenerative changes are noted of the bilateral sacroiliac joints, pubic symphysis, and spine.  No significant inguinal lymphadenopathy.      Impression    1. Surgical changes of colectomy with end ileostomy.  No findings to suggest small bowel obstruction.  There is been interval improvement of mesenteric edema anteriorly.  No focal organized fluid collection.  2. Several left renal low attenuating lesions, nonemergent ultrasound could be performed to confirm cystic nature as warranted.  3. Please see above for several additional findings.      Electronically signed by: Sly Sanford MD  Date:    04/04/2022  Time:    18:44       Pending Diagnostic Studies:     None         Medications:  Reconciled Home Medications:      Medication List      CHANGE how you take these medications    allopurinoL 300 MG tablet  Commonly known as: ZYLOPRIM  Take 1.5 tablets (450 mg total) by mouth once daily.  What changed: when to take this     pravastatin 40 MG tablet  Commonly known as: PravachoL  Take 1 tablet (40 mg total) by mouth once daily.  What changed: how much to take        CONTINUE taking these medications    ACCU-CHEK PAUL CONTROL SOLN Soln  Generic drug: blood glucose control high,low  1 drop by NOT APPLICABLE route once daily.     ACCU-CHEK SOFTCLIX LANCETS Misc  Generic drug: lancets  1 lancet by NOT APPLICABLE route once daily.     ALPRAZolam 0.5 MG tablet  Commonly known as: XANAX  Take 0.5 mg by mouth nightly as needed for Anxiety.     amiodarone 200 MG Tab  Commonly known as: PACERONE  Take 1 tablet (200 mg total) by mouth 2 (two) times daily.     amLODIPine 5 MG tablet  Commonly known as: NORVASC  Take 5 mg by mouth once daily.     aspirin 81 MG EC tablet  Commonly known as: ECOTRIN  Take 81 mg by mouth once daily.      BD ALCOHOL SWABS Padm  Generic drug: alcohol swabs     blood sugar diagnostic Strp  Commonly known as: ONETOUCH ULTRA TEST  USE ONE STRIP TO CHECK GLUCOSE EVERY DAY     brimonidine 0.2% 0.2 % Drop  Commonly known as: ALPHAGAN  INSTILL 1 DROP INTO EACH EYE TWICE DAILY     calcium citrate-vitamin D3 315 mg-6.25 mcg (250 unit) Tab  Commonly known as: CITRACAL + D MAXIMUM  Take 1 tablet by mouth once daily.     carvediloL 12.5 MG tablet  Commonly known as: COREG  Take 12.5 mg by mouth 2 (two) times daily with meals.     clopidogreL 75 mg tablet  Commonly known as: PLAVIX  Take 75 mg by mouth once daily.     droxidopa 100 mg Cap  Take 2 capsules by mouth 2 (two) times a day. For Low blood pressure     FIBER (CALCIUM POLYCARBOPHIL) 625 mg tablet  Generic drug: polycarbophil  Take 3 tablets by mouth before dinner.     fludrocortisone 0.1 mg Tab  Commonly known as: FLORINEF     gabapentin 600 MG tablet  Commonly known as: NEURONTIN  Take 1 tablet (600 mg total) by mouth 2 (two) times daily.     glimepiride 4 MG tablet  Commonly known as: AMARYL  Take 4 mg by mouth 2 (two) times daily before meals.     HYDROcodone-acetaminophen 5-325 mg per tablet  Commonly known as: NORCO  Take 1 tablet by mouth every 12 (twelve) hours as needed for Pain.     magnesium oxide 400 mg (241.3 mg magnesium) tablet  Commonly known as: MAG-OX  Take 400 mg by mouth once daily.     pantoprazole 40 MG tablet  Commonly known as: PROTONIX  Take 40 mg by mouth once daily.        STOP taking these medications    diphenoxylate-atropine 2.5-0.025 mg 2.5-0.025 mg per tablet  Commonly known as: LOMOTIL            Indwelling Lines/Drains at time of discharge:   Lines/Drains/Airways     Drain  Duration                Ileostomy 01/07/19 1101 RLQ 1192 days         Closed/Suction Drain 12/18/20 0938 Left Neck Bulb 15 Fr. 481 days                Time spent on the discharge of patient: 35 minutes     Discussed patient's plan of care with Dr. Marianna ARROYO  RUPERT Frey  Department of Hospital Medicine  [facility] - Telemetry Stepdown ([place])

## 2022-04-13 NOTE — ASSESSMENT & PLAN NOTE
Improved: Cr 2.5 --> 2.1 --> 1.7  - patient with presumed Pre-renal GRADY secondary to hypovolemia, pt with recent hx of high output from Ostomy  - Reports his cardiologist (Dr. Dao) will likely be unable to continue administering outpatient IV fludis - discussed with patient if he may require HH with possible PICC line in future for intermittent IVF and he plans to discuss this with his PCP and nephrologist  - no bleeding from ostomy, no nausea/vomiting and abdominal pain - CT scan shows improved bowel distension present on recent Upper GI with small bowel follow through.   - s/p 1L of LR.  Continue IV fluids overnight - 200cc/hr LR x 10 hours.    - No overt metablic acidosis that might be seen with High output ostomy  - Psyllium and prn loperamide with resolution of diarrhea  - GI consult to evaluate if further medical therapy to modify attenuate ostomy outupt.    - plan for ileoscopy on 4/6, negative for acute findings   - stool studies negative, continue LR infusion at 200 cc/hr x 10 hours  - referral placed to nephrology, GI, and PCP at discharge

## 2022-04-20 ENCOUNTER — TELEPHONE (OUTPATIENT)
Dept: PRIMARY CARE CLINIC | Facility: CLINIC | Age: 72
End: 2022-04-20
Payer: MEDICARE

## 2022-04-20 NOTE — TELEPHONE ENCOUNTER
----- Message from Camille Bonner sent at 4/20/2022  2:57 PM CDT -----  Contact: self [phone]  Calling to get test results.  Name of test (lab, x-ray): labs  Date of test: 4/19/22  Where was the test performed: [facility] - Lab  Would you like a call back, or a response through your [facility] portal?: call back  Comments:     Please call and advise

## 2022-04-20 NOTE — TELEPHONE ENCOUNTER
Spoke with pt. Who had a BMP yesterday at the hospital - notified kidneys are same minimal change. Will follow up with MD as scheduled.

## 2022-04-26 ENCOUNTER — OFFICE VISIT (OUTPATIENT)
Dept: UROLOGY | Facility: CLINIC | Age: 72
End: 2022-04-26
Payer: MEDICARE

## 2022-04-26 ENCOUNTER — OFFICE VISIT (OUTPATIENT)
Dept: DERMATOLOGY | Facility: CLINIC | Age: 72
End: 2022-04-26
Payer: MEDICARE

## 2022-04-26 VITALS
DIASTOLIC BLOOD PRESSURE: 93 MMHG | BODY MASS INDEX: 26.69 KG/M2 | HEART RATE: 66 BPM | WEIGHT: 208 LBS | HEIGHT: 74 IN | SYSTOLIC BLOOD PRESSURE: 169 MMHG

## 2022-04-26 DIAGNOSIS — L57.0 MULTIPLE ACTINIC KERATOSES: ICD-10-CM

## 2022-04-26 DIAGNOSIS — C61 PROSTATE CANCER: ICD-10-CM

## 2022-04-26 DIAGNOSIS — Z87.898 HISTORY OF ELEVATED PSA: Primary | ICD-10-CM

## 2022-04-26 DIAGNOSIS — D48.5 NEOPLASM OF UNCERTAIN BEHAVIOR OF SKIN: Primary | ICD-10-CM

## 2022-04-26 DIAGNOSIS — Z90.79 S/P TURP: ICD-10-CM

## 2022-04-26 PROCEDURE — 3051F PR MOST RECENT HEMOGLOBIN A1C LEVEL 7.0 - < 8.0%: ICD-10-PCS | Mod: CPTII,S$GLB,, | Performed by: PATHOLOGY

## 2022-04-26 PROCEDURE — 1159F MED LIST DOCD IN RCRD: CPT | Mod: CPTII,S$GLB,, | Performed by: UROLOGY

## 2022-04-26 PROCEDURE — 3008F BODY MASS INDEX DOCD: CPT | Mod: CPTII,S$GLB,, | Performed by: UROLOGY

## 2022-04-26 PROCEDURE — 1126F AMNT PAIN NOTED NONE PRSNT: CPT | Mod: CPTII,S$GLB,, | Performed by: UROLOGY

## 2022-04-26 PROCEDURE — 3066F NEPHROPATHY DOC TX: CPT | Mod: CPTII,S$GLB,, | Performed by: PATHOLOGY

## 2022-04-26 PROCEDURE — 1101F PR PT FALLS ASSESS DOC 0-1 FALLS W/OUT INJ PAST YR: ICD-10-PCS | Mod: CPTII,S$GLB,, | Performed by: PATHOLOGY

## 2022-04-26 PROCEDURE — 17000 PR DESTRUCTION(LASER SURGERY,CRYOSURGERY,CHEMOSURGERY),PREMALIGNANT LESIONS,FIRST LESION: ICD-10-PCS | Mod: XS,S$GLB,, | Performed by: PATHOLOGY

## 2022-04-26 PROCEDURE — 11103 PR TANGENTIAL BIOPSY, SKIN, EA ADDTL LESION: ICD-10-PCS | Mod: S$GLB,,, | Performed by: PATHOLOGY

## 2022-04-26 PROCEDURE — 99999 PR PBB SHADOW E&M-EST. PATIENT-LVL IV: CPT | Mod: PBBFAC,,, | Performed by: UROLOGY

## 2022-04-26 PROCEDURE — 1159F PR MEDICATION LIST DOCUMENTED IN MEDICAL RECORD: ICD-10-PCS | Mod: CPTII,S$GLB,, | Performed by: PATHOLOGY

## 2022-04-26 PROCEDURE — 99213 OFFICE O/P EST LOW 20 MIN: CPT | Mod: 25,S$GLB,, | Performed by: PATHOLOGY

## 2022-04-26 PROCEDURE — 3051F PR MOST RECENT HEMOGLOBIN A1C LEVEL 7.0 - < 8.0%: ICD-10-PCS | Mod: CPTII,S$GLB,, | Performed by: UROLOGY

## 2022-04-26 PROCEDURE — 3288F PR FALLS RISK ASSESSMENT DOCUMENTED: ICD-10-PCS | Mod: CPTII,S$GLB,, | Performed by: PATHOLOGY

## 2022-04-26 PROCEDURE — 3080F PR MOST RECENT DIASTOLIC BLOOD PRESSURE >= 90 MM HG: ICD-10-PCS | Mod: CPTII,S$GLB,, | Performed by: UROLOGY

## 2022-04-26 PROCEDURE — 99213 PR OFFICE/OUTPT VISIT, EST, LEVL III, 20-29 MIN: ICD-10-PCS | Mod: 25,S$GLB,, | Performed by: PATHOLOGY

## 2022-04-26 PROCEDURE — 3077F SYST BP >= 140 MM HG: CPT | Mod: CPTII,S$GLB,, | Performed by: UROLOGY

## 2022-04-26 PROCEDURE — 1126F PR PAIN SEVERITY QUANTIFIED, NO PAIN PRESENT: ICD-10-PCS | Mod: CPTII,S$GLB,, | Performed by: PATHOLOGY

## 2022-04-26 PROCEDURE — 17003 DESTRUCTION, PREMALIGNANT LESIONS; SECOND THROUGH 14 LESIONS: ICD-10-PCS | Mod: S$GLB,,, | Performed by: PATHOLOGY

## 2022-04-26 PROCEDURE — 1126F AMNT PAIN NOTED NONE PRSNT: CPT | Mod: CPTII,S$GLB,, | Performed by: PATHOLOGY

## 2022-04-26 PROCEDURE — 99999 PR PBB SHADOW E&M-EST. PATIENT-LVL IV: ICD-10-PCS | Mod: PBBFAC,,, | Performed by: UROLOGY

## 2022-04-26 PROCEDURE — 11102 PR TANGENTIAL BIOPSY, SKIN, SINGLE LESION: ICD-10-PCS | Mod: S$GLB,,, | Performed by: PATHOLOGY

## 2022-04-26 PROCEDURE — 3077F PR MOST RECENT SYSTOLIC BLOOD PRESSURE >= 140 MM HG: ICD-10-PCS | Mod: CPTII,S$GLB,, | Performed by: UROLOGY

## 2022-04-26 PROCEDURE — 11103 TANGNTL BX SKIN EA SEP/ADDL: CPT | Mod: S$GLB,,, | Performed by: PATHOLOGY

## 2022-04-26 PROCEDURE — 3288F FALL RISK ASSESSMENT DOCD: CPT | Mod: CPTII,S$GLB,, | Performed by: PATHOLOGY

## 2022-04-26 PROCEDURE — 3066F PR DOCUMENTATION OF TREATMENT FOR NEPHROPATHY: ICD-10-PCS | Mod: CPTII,S$GLB,, | Performed by: PATHOLOGY

## 2022-04-26 PROCEDURE — 1159F PR MEDICATION LIST DOCUMENTED IN MEDICAL RECORD: ICD-10-PCS | Mod: CPTII,S$GLB,, | Performed by: UROLOGY

## 2022-04-26 PROCEDURE — 88305 TISSUE EXAM BY PATHOLOGIST: CPT | Mod: 26,,, | Performed by: PATHOLOGY

## 2022-04-26 PROCEDURE — 88305 TISSUE EXAM BY PATHOLOGIST: CPT | Performed by: PATHOLOGY

## 2022-04-26 PROCEDURE — 11102 TANGNTL BX SKIN SINGLE LES: CPT | Mod: S$GLB,,, | Performed by: PATHOLOGY

## 2022-04-26 PROCEDURE — 99214 PR OFFICE/OUTPT VISIT, EST, LEVL IV, 30-39 MIN: ICD-10-PCS | Mod: S$GLB,,, | Performed by: UROLOGY

## 2022-04-26 PROCEDURE — 88305 TISSUE EXAM BY PATHOLOGIST: ICD-10-PCS | Mod: 26,,, | Performed by: PATHOLOGY

## 2022-04-26 PROCEDURE — 99999 PR PBB SHADOW E&M-EST. PATIENT-LVL IV: ICD-10-PCS | Mod: PBBFAC,,, | Performed by: PATHOLOGY

## 2022-04-26 PROCEDURE — 17003 DESTRUCT PREMALG LES 2-14: CPT | Mod: S$GLB,,, | Performed by: PATHOLOGY

## 2022-04-26 PROCEDURE — 3066F PR DOCUMENTATION OF TREATMENT FOR NEPHROPATHY: ICD-10-PCS | Mod: CPTII,S$GLB,, | Performed by: UROLOGY

## 2022-04-26 PROCEDURE — 3051F HG A1C>EQUAL 7.0%<8.0%: CPT | Mod: CPTII,S$GLB,, | Performed by: UROLOGY

## 2022-04-26 PROCEDURE — 3051F HG A1C>EQUAL 7.0%<8.0%: CPT | Mod: CPTII,S$GLB,, | Performed by: PATHOLOGY

## 2022-04-26 PROCEDURE — 3066F NEPHROPATHY DOC TX: CPT | Mod: CPTII,S$GLB,, | Performed by: UROLOGY

## 2022-04-26 PROCEDURE — 3080F DIAST BP >= 90 MM HG: CPT | Mod: CPTII,S$GLB,, | Performed by: UROLOGY

## 2022-04-26 PROCEDURE — 3008F PR BODY MASS INDEX (BMI) DOCUMENTED: ICD-10-PCS | Mod: CPTII,S$GLB,, | Performed by: UROLOGY

## 2022-04-26 PROCEDURE — 1159F MED LIST DOCD IN RCRD: CPT | Mod: CPTII,S$GLB,, | Performed by: PATHOLOGY

## 2022-04-26 PROCEDURE — 1101F PT FALLS ASSESS-DOCD LE1/YR: CPT | Mod: CPTII,S$GLB,, | Performed by: PATHOLOGY

## 2022-04-26 PROCEDURE — 99214 OFFICE O/P EST MOD 30 MIN: CPT | Mod: S$GLB,,, | Performed by: UROLOGY

## 2022-04-26 PROCEDURE — 17000 DESTRUCT PREMALG LESION: CPT | Mod: XS,S$GLB,, | Performed by: PATHOLOGY

## 2022-04-26 PROCEDURE — 1126F PR PAIN SEVERITY QUANTIFIED, NO PAIN PRESENT: ICD-10-PCS | Mod: CPTII,S$GLB,, | Performed by: UROLOGY

## 2022-04-26 PROCEDURE — 99999 PR PBB SHADOW E&M-EST. PATIENT-LVL IV: CPT | Mod: PBBFAC,,, | Performed by: PATHOLOGY

## 2022-04-26 NOTE — PATIENT INSTRUCTIONS
Lab Results   Component Value Date    PSA 0.18 11/19/2020    PSA 7.9 (H) 04/20/2020    PSA 2.1 09/15/2014    PSADIAG 0.03 03/04/2022    PSADIAG 0.03 12/16/2021    PSADIAG 0.02 10/29/2021

## 2022-04-26 NOTE — PROGRESS NOTES
CC: elevated PSA with suspected prostate cancer    Jarrett Charlton Jr. is a 72 y.o. man who is here for Follow-up    Jarrett Charlton Jr. is a 70 y.o. man who is here for the evaluation of elevated PSA.  He had Lupron injection treatment for suspected prostate cancer with rising PSA      a history of elevated PSA, negative TUR biopsy in January. History of APR for crohn's disease.  MRI- 15 ccs. PI-RADS 4, 1 cm lesion, right base PZ. Negative extra prostatic extension,NVB,SV nodes.     Hx of no rectum as well as concerning PIRADS lesion on MRI.   Had TURP for biopsying suspicious area      Date: 01/23/2019  Procedure(s) Performed:   TURP  Findings:   Open bladder neck some regrowth of adenoma and suspicious area in right mid prostate   Right side of prostate resected and sent for pathology  SPECIMEN  1) Right prostate chips.  FINAL PATHOLOGIC DIAGNOSIS  Right prostate gland, transurethral resection:  - Benign prostatic tissue with nodular stromal hyperplasia  - Negative for malignancy     Since TURP, he noted that he can void better with better urine flow.  Blood in urine resolved.  However, he reports Occasional ZEB but it got all better.  No more ZEB reported.  He is here with another MRI of prostate following TURP because of still rising PSA up to 6.3 from 4.1.     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  He was not interested in SUDS.  S/p colon surgery and his anus is closed.  Therefore dong TRUS bx of prostate is not feasible.  After discussion, we decided to try finasteride to see whether it will lower his PSA.   He has been on finasteride for the past couple of years. Following TURP in 1/2019, we decided to stop taking finasteride.     When he was considered to undergo TURP, we could not do his surgery because he was not able to stop ASA or Plavix due to fresh vascular stent.  He got a clearance that he can hold off  plavix and ASA prior to TURP.  Hx of ulcerative colitis, had colon surgery back in 1985 and his anus was closed.  Has a neuropathy on the right leg.  Hx of sciatic nerve on both sides and received injection on the back.  Hx of diabetes diagnosed 2 years ago.  No family hx of prostate cancer.  He is a .  Denies flank pain, dysuira, hematuria .       MRI of prostate 8/20/19  Previous biopsy: Negative on 01/23/2019  PSA: 6.3 ng/mL (08/16/2019)  Prior therapy: TURP  Prostate: 3.6 x 3.5 x 3.0 cm corresponding to a computed volume of 15.2 cc.  Peripheral zone: Focal abnormalities with imaging features concerning for prostate cancer.  Lesion (MARYCARMEN) #1  Location: Side: right; Region: base; Zone: posterior peripheral zone laterally  Greatest dimension: 1.0 cm  T2-WI: T2 SI: circumscribed, homogeneous moderate hypointensity--score 4 or 5  DWI/ADC: DWI: Focal markedly hypointense on ADC and markedly hyperintense on high b-value DWI--score 4 or 5  DCE: Positive  Extraprostatic extension: No gross extraprostatic extension noting postsurgical changes limits evaluation.  PI-RADS assessment category: 4    Transitional zone: Mostly surgically resected.  Neurovascular bundle: Normal.  Seminal vesicles:  SV invasion: None  Adjacent Organ Involvement: No focal bladder wall thickening.  No rectal involvement.  Lymphadenopathy: None     On 11/22/19, pt underwent perineal biopsy by IR for CT guided needle bx for the right posterolateral prostate lesion;  Elevated prostate specific antigen (PSA)  Pathology:  BIOPSY OF PROSTATE BED:  FIBROFATTY TISSUE WITH NO NEOPLASIA IDENTIFIED     Pt had his PSA repeated and is now elevated to 7.9.  He was very anxious about possible prostate cancer and would like to to something about it.  So we have started ADT.  He is here with PSA follow up.  Denies any problems. No hot flashes, decreased energy level.    Past Medical History:   Diagnosis Date    Basal cell carcinoma     BPH (benign  prostatic hyperplasia)     s/p TURP    Carotid stenosis     Chronic kidney disease     Claudication     Coronary artery disease     DDD (degenerative disc disease) 10/21/2013    Diabetes mellitus with renal complications     Disc disease, degenerative, cervical     Encounter for blood transfusion     GERD (gastroesophageal reflux disease)     Gout, chronic     History of ulcerative colitis     s/p colectomy and ileostomy    HLD (hyperlipidemia)     HTN (hypertension)     Ileostomy in place 1982    RBBB     Squamous cell carcinoma 03/08/2018    Left superior helix near insertion    Squamous cell carcinoma 04/12/2018    Left forearm x 5    Ventricular tachycardia      Past Surgical History:   Procedure Laterality Date    cardiac stents      CATARACT EXTRACTION Bilateral     CERVICAL FUSION      colectomy and ileostomy  1985    EXCISION OF PAROTID GLAND Left 12/18/2020    Procedure: EXCISION, PAROTID GLAND;  Surgeon: Michael Pinzon MD;  Location: [facility] OR [address] [place] [address];  Service: ENT;  Laterality: Left;    INSERTION OF IMPLANTABLE LOOP RECORDER  06/07/2021    INSERTION OF IMPLANTABLE LOOP RECORDER Left 6/7/2021    Procedure: INSERTION, IMPLANTABLE LOOP RECORDER;  Surgeon: Romario Dao MD;  Location: [facility] CATH LAB;  Service: Cardiology;  Laterality: Left;    LEFT HEART CATHETERIZATION Right 4/15/2021    Procedure: CATHETERIZATION, HEART, LEFT;  Surgeon: Marcio Jones MD;  Location: [facility] CATH LAB;  Service: Cardiology;  Laterality: Right;    LYSIS OF ADHESIONS N/A 11/9/2020    Procedure: LYSIS, ADHESIONS,  ERAS low;  Surgeon: CED Haley MD;  [facility];  Service: Colon and Rectal;  Laterality: N/A;    POUCHOSCOPY N/A 4/6/2022    Procedure: ENDOSCOPY, POUCH, SMALL INTESTINE, DIAGNOSTIC;  Surgeon: Dieter Juarez MD;  Location: [facility] ENDO ([address] [place] [address]);  Service: Endoscopy;  Laterality: N/A;    REPAIR, HERNIA, PARASTOMAL N/A 11/9/2020    Procedure: REPAIR, HERNIA,  PARASTOMAL;  Surgeon: CED Haley MD;  Location: [facility] OR 2ND FLR;  Service: Colon and Rectal;  Laterality: N/A;    TRANSURETHRAL RESECTION OF PROSTATE (TURP) WITHOUT USE OF LASER N/A 1/23/2019    Procedure: TURP, WITHOUT USING LASER BIPOLAR;  Surgeon: Catarino Mota MD;  Location: [facility] OR 1ST FLR;  Service: Urology;  Laterality: N/A;  1.5 HOURS     Social History     Tobacco Use    Smoking status: Never Smoker    Smokeless tobacco: Never Used   Substance Use Topics    Alcohol use: No    Drug use: No     Family History   Problem Relation Age of Onset    Cancer Father     Diabetes Father     Dementia Mother     Melanoma Neg Hx     Hypertension Neg Hx     Arthritis Neg Hx      Allergy:  Review of patient's allergies indicates:   Allergen Reactions    Crestor [rosuvastatin]     Lipitor [atorvastatin]      Outpatient Encounter Medications as of 4/26/2022   Medication Sig Dispense Refill    ACCU-CHEK PAUL CONTROL SOLN Soln 1 drop by NOT APPLICABLE route once daily.      ACCU-CHEK SOFTCLIX LANCETS Misc 1 lancet by NOT APPLICABLE route once daily.      allopurinoL (ZYLOPRIM) 300 MG tablet Take 1.5 tablets (450 mg total) by mouth once daily. (Patient taking differently: Take 450 mg by mouth every evening.) 135 tablet 3    ALPRAZolam (XANAX) 0.5 MG tablet Take 0.5 mg by mouth nightly as needed for Anxiety.      amiodarone (PACERONE) 200 MG Tab Take 1 tablet (200 mg total) by mouth 2 (two) times daily.      amLODIPine (NORVASC) 5 MG tablet Take 5 mg by mouth once daily.      aspirin (ECOTRIN) 81 MG EC tablet Take 81 mg by mouth once daily.      BD ALCOHOL SWABS PadM       blood sugar diagnostic (ONE TOUCH ULTRA TEST) Strp USE ONE STRIP TO CHECK GLUCOSE EVERY  each 11    brimonidine 0.2% (ALPHAGAN) 0.2 % Drop INSTILL 1 DROP INTO EACH EYE TWICE DAILY 20 mL 0    calcium citrate-vitamin D3 (CITRACAL + D MAXIMUM) 315 mg-6.25 mcg (250 unit) Tab Take 1 tablet by mouth once daily. 120 tablet 3     carvediloL (COREG) 12.5 MG tablet Take 12.5 mg by mouth 2 (two) times daily with meals.      clopidogrel (PLAVIX) 75 mg tablet Take 75 mg by mouth once daily.      droxidopa 100 mg Cap Take 2 capsules by mouth 2 (two) times a day. For Low blood pressure      FIBER, CALCIUM POLYCARBOPHIL, 625 mg tablet Take 3 tablets by mouth before dinner.      fludrocortisone (FLORINEF) 0.1 mg Tab       gabapentin (NEURONTIN) 600 MG tablet Take 1 tablet (600 mg total) by mouth 2 (two) times daily. 180 tablet 3    glimepiride (AMARYL) 4 MG tablet Take 4 mg by mouth 2 (two) times daily before meals.      HYDROcodone-acetaminophen (NORCO) 5-325 mg per tablet Take 1 tablet by mouth every 12 (twelve) hours as needed for Pain. 20 tablet 0    magnesium oxide (MAG-OX) 400 mg (241.3 mg magnesium) tablet Take 400 mg by mouth once daily.      pantoprazole (PROTONIX) 40 MG tablet Take 40 mg by mouth once daily.      pravastatin (PRAVACHOL) 40 MG tablet Take 1 tablet (40 mg total) by mouth once daily. (Patient taking differently: Take 80 mg by mouth once daily.) 90 tablet 3     Facility-Administered Encounter Medications as of 4/26/2022   Medication Dose Route Frequency Provider Last Rate Last Admin    leuprolide (6 month) injection 45 mg  45 mg Intramuscular Q6 Months Catarino Mota MD        sodium chloride 0.9% in 1,000 mL infusion   Intravenous Continuous Paper Order         Review of Systems   ROS  Physical Exam     Vitals:    04/26/22 0816   BP: (!) 169/93   Pulse: 66     Physical Exam  Genitalia:  Scrotum: no rash or lesion  Normal symmetric epididymis without masses  Normal vas palpated  Normal size, symmetric testicles with no masses   Normal urethral meatus with no discharge  Normal circumcised penis with no lesion   Rectal:  Normal perineum and anus upon inspection.  Normal tone, no masses or tenderness;     LABS:  Lab Results   Component Value Date    PSA 0.18 11/19/2020    PSA 7.9 (H) 04/20/2020    PSA 2.1  09/15/2014    PSA 2.16 05/13/2013    PSA 1.2 03/19/2012    PSA 1.0 02/19/2010    PSA 1.4 02/17/2009    PSA 1.1 04/30/2007    PSA 1.0 05/12/2006    PSA 1.0 04/22/2005    PSADIAG 0.03 03/04/2022    PSADIAG 0.03 12/16/2021    PSADIAG 0.02 10/29/2021    PSADIAG 0.02 09/14/2021    PSADIAG 0.04 05/25/2021    PSADIAG 0.10 02/11/2021    PSADIAG 0.54 08/11/2020    PSADIAG 6.3 (H) 08/16/2019    PSADIAG 4.1 (H) 11/21/2018    PSADIAG 3.3 06/15/2018     Results for orders placed or performed in visit on 03/04/22   PROSTATE SPECIFIC ANTIGEN, DIAGNOSTIC   Result Value Ref Range    PSA Diagnostic 0.03 0.00 - 4.00 ng/mL   Results for orders placed or performed in visit on 12/16/21   PROSTATE SPECIFIC ANTIGEN, DIAGNOSTIC   Result Value Ref Range    PSA Diagnostic 0.03 0.00 - 4.00 ng/mL   Results for orders placed or performed in visit on 10/29/21   Prostate Specific Antigen, Diagnostic   Result Value Ref Range    PSA Diagnostic 0.02 0.00 - 4.00 ng/mL     Lab Results   Component Value Date    CREATININE 2.0 (H) 04/19/2022    CREATININE 1.9 (H) 04/11/2022    CREATININE 1.7 (H) 04/06/2022     Results for orders placed or performed in visit on 03/04/22   Testosterone   Result Value Ref Range    Testosterone, Total 21 (L) 304 - 1227 ng/dL   Results for orders placed or performed in visit on 12/16/21   Testosterone   Result Value Ref Range    Testosterone, Total 15 (L) 304 - 1227 ng/dL   Results for orders placed or performed in visit on 09/14/21   Testosterone   Result Value Ref Range    Testosterone, Total 9 (L) 304 - 1227 ng/dL     Urine Culture, Routine   Date Value Ref Range Status   02/20/2019 No significant growth  Final     Hemoglobin A1C   Date Value Ref Range Status   03/04/2022 7.0 (H) 4.0 - 5.6 % Final     Comment:     ADA Screening Guidelines:  5.7-6.4%  Consistent with prediabetes  >or=6.5%  Consistent with diabetes    High levels of fetal hemoglobin interfere with the HbA1C  assay. Heterozygous hemoglobin variants (HbS, HgC,  "etc)do  not significantly interfere with this assay.   However, presence of multiple variants may affect accuracy.     12/16/2021 6.8 (H) 4.0 - 5.6 % Final     Comment:     ADA Screening Guidelines:  5.7-6.4%  Consistent with prediabetes  >or=6.5%  Consistent with diabetes    High levels of fetal hemoglobin interfere with the HbA1C  assay. Heterozygous hemoglobin variants (HbS, HgC, etc)do  not significantly interfere with this assay.   However, presence of multiple variants may affect accuracy.         Radiology:  MRI of prostate 8/20/19  Previous biopsy: Negative on 01/23/2019  PSA: 6.3 ng/mL (08/16/2019)  Prior therapy: TURP  Prostate: 3.6 x 3.5 x 3.0 cm corresponding to a computed volume of 15.2 cc.  Peripheral zone: Focal abnormalities with imaging features concerning for prostate cancer.  Lesion (MARYCARMEN) #1  Location: Side: right; Region: base; Zone: posterior peripheral zone laterally  Greatest dimension: 1.0 cm  T2-WI: T2 SI: circumscribed, homogeneous moderate hypointensity--score 4 or 5  DWI/ADC: DWI: Focal markedly hypointense on ADC and markedly hyperintense on high b-value DWI--score 4 or 5  DCE: Positive  Extraprostatic extension: No gross extraprostatic extension noting postsurgical changes limits evaluation.  PI-RADS assessment category: 4    Transitional zone: Mostly surgically resected.  Neurovascular bundle: Normal.  Seminal vesicles:  SV invasion: None  Adjacent Organ Involvement: No focal bladder wall thickening.  No rectal involvement.  Lymphadenopathy: None  Assessment and Plan:  Jarrett was seen today for follow-up.    Diagnoses and all orders for this visit:    History of elevated PSA  -     Prostate Specific Antigen, Diagnostic; Future  -     Testosterone; Future    S/P TURP    Prostate cancer  -     Prostate Specific Antigen, Diagnostic; Future    hx of "suspected prostate cancer" with abnormal MRI of prostate with rising PSA up to 7.9 in 4/20/20.  Never proved that he has a prostate cancer " with tissue diagnosis despite TURP.  Unable to perform prostate biopsy because hs anus is closed off.    his PSA is markedly down since Lupron injection   He received two doses of Lupron injection so far.  Last injection was given 12/20/20.    His PSA remains undetectable and his testosterone level remains at castration level.  So no Lupron injection today.  Will continue to follow up with serial PSA.    Will follow up with serial PSA in Dec. 2022.  Continue citracal with Vit D supplement.    I spent 25 minutes with the patient of which more than half was spent in direct consultation with the patient in regards to our treatment and plan.      Follow-up:  Follow up in about 32 weeks (around 12/6/2022) for PSA, Testosterone.

## 2022-04-26 NOTE — PROGRESS NOTES
Subjective:       Patient ID:  Jarrett Charlton Jr. is a 72 y.o. male who presents for   Chief Complaint   Patient presents with    Skin Check     Follow up      HPI  Pt with multiple HAKs, SCCIS and invasive SCCs to forearms.  Most recently had Mohs to left forearm lateral and medial by SSW in November 2021.  Completely 6 days of bid treatment to bilateral forearms and hands with Efudex/ Dovonex compound prior to last visit with good response.  He repeated 5 days of Efudex/ Dovonex since last visit with good response.  2 residual scaly papules to left forearm/ wrist.  This is a high risk patient here to check for the development of new lesions.    Review of Systems   Constitutional: Negative.  Negative for fever, chills, fatigue and malaise.   Skin: Positive for dry skin, sun sensitivity, daily sunscreen use, activity-related sunscreen use and wears hat. Negative for itching, rash and recent sunburn.   Hematologic/Lymphatic: Bruises/bleeds easily.        Objective:    Physical Exam   Constitutional: He appears well-developed and well-nourished.   Neurological: He is alert.   Skin:   Areas Examined (abnormalities noted in diagram):   Head / Face Inspection Performed  RUE Inspected  LUE Inspection Performed                   Diagram Legend     Erythematous scaling macule/papule c/w actinic keratosis       Vascular papule c/w angioma      Pigmented verrucoid papule/plaque c/w seborrheic keratosis      Yellow umbilicated papule c/w sebaceous hyperplasia      Irregularly shaped tan macule c/w lentigo     1-2 mm smooth white papules consistent with Milia      Movable subcutaneous cyst with punctum c/w epidermal inclusion cyst      Subcutaneous movable cyst c/w pilar cyst      Firm pink to brown papule c/w dermatofibroma      Pedunculated fleshy papule(s) c/w skin tag(s)      Evenly pigmented macule c/w junctional nevus     Mildly variegated pigmented, slightly irregular-bordered macule c/w mildly atypical nevus       Flesh colored to evenly pigmented papule c/w intradermal nevus       Pink pearly papule/plaque c/w basal cell carcinoma      Erythematous hyperkeratotic cursted plaque c/w SCC      Surgical scar with no sign of skin cancer recurrence      Open and closed comedones      Inflammatory papules and pustules      Verrucoid papule consistent consistent with wart     Erythematous eczematous patches and plaques     Dystrophic onycholytic nail with subungual debris c/w onychomycosis     Umbilicated papule    Erythematous-base heme-crusted tan verrucoid plaque consistent with inflamed seborrheic keratosis     Erythematous Silvery Scaling Plaque c/w Psoriasis     See annotation          Assessment / Plan:      Pathology Orders:     Normal Orders This Visit    Specimen to Pathology, Dermatology     Questions:    Procedure Type: Dermatology and skin neoplasms    Number of Specimens: 2    ------------------------: -------------------------    Spec 1 Procedure: Biopsy    Spec 1 Clinical Impression: r/o HAK vs SCC    Spec 1 Source: left distal forearm    ------------------------: -------------------------    Spec 2 Procedure: Biopsy    Spec 2 Clinical Impression: r/o ISK vs AK vs SCC    Spec 2 Source: left volar forearm    Release to patient: Immediate        Neoplasm of uncertain behavior of skin  -     Specimen to Pathology, Dermatology    Shave biopsy procedure note:    Shave biopsies x 2 performed after verbal consent including risk of infection, scar, recurrence, need for additional treatment of site. Area prepped with alcohol, anesthetized with approximately 1.0cc of 1% lidocaine with epinephrine. Lesional tissue shaved with razor blade. Hemostasis achieved with application of aluminum chloride followed by hyfrecation. No complications. Dressing applied. Wound care explained.        Multiple actinic keratoses - Cryosurgery Procedure Note    Verbal consent from the patient is obtained including, but not limited to, risk of  hypopigmentation/hyperpigmentation, scar, recurrence of lesion. The patient is aware of the precancerous quality and need for treatment of these lesions. Liquid nitrogen cryosurgery is applied to the 6 actinic keratoses, as detailed in the physical exam, to produce a freeze injury. The patient is aware that blisters may form and is instructed on wound care with gentle cleansing and use of vaseline ointment to keep moist until healed. The patient is supplied a handout on cryosurgery and is instructed to call if lesions do not completely resolve.    - Start skin medicinals compound of Chemo cream- 5-Florouracil 5%, Calcipotriene 0.0025%  Disp 30g  Tube with 1 refill, apply to affected area of the hands and forearms  x  5 days as instructed on prescription.  Wait 2-3 months before repeating this.  -Discussed this will be dispensed via compounding pharmacy, they will contact you through your email on file, it will be shipped in the mail                 No follow-ups on file.

## 2022-04-29 ENCOUNTER — INFUSION (OUTPATIENT)
Dept: INFECTIOUS DISEASES | Facility: HOSPITAL | Age: 72
End: 2022-04-29
Attending: INTERNAL MEDICINE
Payer: MEDICARE

## 2022-04-29 VITALS
OXYGEN SATURATION: 98 % | DIASTOLIC BLOOD PRESSURE: 88 MMHG | RESPIRATION RATE: 18 BRPM | WEIGHT: 215.25 LBS | BODY MASS INDEX: 27.64 KG/M2 | SYSTOLIC BLOOD PRESSURE: 160 MMHG | TEMPERATURE: 97 F | HEART RATE: 84 BPM

## 2022-04-29 DIAGNOSIS — Z98.890 STATUS POST REPAIR OF VENTRAL HERNIA: Primary | ICD-10-CM

## 2022-04-29 DIAGNOSIS — Z87.19 STATUS POST REPAIR OF VENTRAL HERNIA: Primary | ICD-10-CM

## 2022-04-29 DIAGNOSIS — E87.20 METABOLIC ACIDOSIS WITH NORMAL ANION GAP AND BICARBONATE LOSSES: ICD-10-CM

## 2022-04-29 PROCEDURE — 96361 HYDRATE IV INFUSION ADD-ON: CPT

## 2022-04-29 PROCEDURE — 25000003 PHARM REV CODE 250: Performed by: NURSE PRACTITIONER

## 2022-04-29 PROCEDURE — 96360 HYDRATION IV INFUSION INIT: CPT

## 2022-04-29 RX ORDER — SODIUM CHLORIDE 0.9 % (FLUSH) 0.9 %
10 SYRINGE (ML) INJECTION
Status: DISCONTINUED | OUTPATIENT
Start: 2022-04-29 | End: 2022-04-29 | Stop reason: HOSPADM

## 2022-04-29 RX ADMIN — SODIUM CHLORIDE 1000 ML: 0.9 INJECTION, SOLUTION INTRAVENOUS at 09:04

## 2022-05-02 LAB
FINAL PATHOLOGIC DIAGNOSIS: NORMAL
GROSS: NORMAL
Lab: NORMAL
MICROSCOPIC EXAM: NORMAL

## 2022-05-09 ENCOUNTER — TELEPHONE (OUTPATIENT)
Dept: NEPHROLOGY | Facility: CLINIC | Age: 72
End: 2022-05-09
Payer: MEDICARE

## 2022-05-09 NOTE — TELEPHONE ENCOUNTER
----- Message from Bianka Arevalo NP sent at 5/9/2022 11:00 AM CDT -----  Regarding: RE: pt advice  Contact: pt @ [phone]  Pls let him know I spoke with the infusion center, and it has been fixed.   Thanks  ----- Message -----  From: Kayley Chisholm MA  Sent: 5/9/2022  10:18 AM CDT  To: Bianka Arevalo NP  Subject: FW: pt advice                                    Please advise  ----- Message -----  From: Sara Shea  Sent: 5/9/2022  10:03 AM CDT  To: [facility] Staff  Subject: pt advice                                        Pt asking to speak with someone in Dr. Arevalo's office regarding his infusion, asking to have changed to 1 hour drip. Please call.

## 2022-05-10 ENCOUNTER — PATIENT OUTREACH (OUTPATIENT)
Dept: ADMINISTRATIVE | Facility: OTHER | Age: 72
End: 2022-05-10
Payer: MEDICARE

## 2022-05-10 NOTE — PROGRESS NOTES
Health Maintenance Due   Topic Date Due    Eye Exam  09/24/2021    COVID-19 Vaccine (4 - Booster for Pfizer series) 02/01/2022     Updates were requested from care everywhere.  Chart was reviewed for overdue Proactive [facility] Encounters ([facility]) topics (CRS, Breast Cancer Screening, Eye exam)  Health Maintenance has been updated.  [facility] immunization registry triggered.  Immunizations were reconciled.

## 2022-05-12 ENCOUNTER — OFFICE VISIT (OUTPATIENT)
Dept: RHEUMATOLOGY | Facility: CLINIC | Age: 72
End: 2022-05-12
Payer: MEDICARE

## 2022-05-12 VITALS
BODY MASS INDEX: 26.83 KG/M2 | DIASTOLIC BLOOD PRESSURE: 80 MMHG | WEIGHT: 209 LBS | SYSTOLIC BLOOD PRESSURE: 144 MMHG | HEART RATE: 65 BPM

## 2022-05-12 DIAGNOSIS — M77.8 LEFT HAND TENDONITIS: Primary | ICD-10-CM

## 2022-05-12 DIAGNOSIS — M15.9 GENERALIZED OSTEOARTHRITIS: ICD-10-CM

## 2022-05-12 DIAGNOSIS — M1A.09X1 IDIOPATHIC CHRONIC GOUT OF MULTIPLE SITES WITH TOPHUS: ICD-10-CM

## 2022-05-12 PROCEDURE — 3077F SYST BP >= 140 MM HG: CPT | Mod: CPTII,S$GLB,, | Performed by: INTERNAL MEDICINE

## 2022-05-12 PROCEDURE — 99213 PR OFFICE/OUTPT VISIT, EST, LEVL III, 20-29 MIN: ICD-10-PCS | Mod: S$GLB,,, | Performed by: INTERNAL MEDICINE

## 2022-05-12 PROCEDURE — 3077F PR MOST RECENT SYSTOLIC BLOOD PRESSURE >= 140 MM HG: ICD-10-PCS | Mod: CPTII,S$GLB,, | Performed by: INTERNAL MEDICINE

## 2022-05-12 PROCEDURE — 1125F PR PAIN SEVERITY QUANTIFIED, PAIN PRESENT: ICD-10-PCS | Mod: CPTII,S$GLB,, | Performed by: INTERNAL MEDICINE

## 2022-05-12 PROCEDURE — 1125F AMNT PAIN NOTED PAIN PRSNT: CPT | Mod: CPTII,S$GLB,, | Performed by: INTERNAL MEDICINE

## 2022-05-12 PROCEDURE — 3051F HG A1C>EQUAL 7.0%<8.0%: CPT | Mod: CPTII,S$GLB,, | Performed by: INTERNAL MEDICINE

## 2022-05-12 PROCEDURE — 3079F PR MOST RECENT DIASTOLIC BLOOD PRESSURE 80-89 MM HG: ICD-10-PCS | Mod: CPTII,S$GLB,, | Performed by: INTERNAL MEDICINE

## 2022-05-12 PROCEDURE — 3008F BODY MASS INDEX DOCD: CPT | Mod: CPTII,S$GLB,, | Performed by: INTERNAL MEDICINE

## 2022-05-12 PROCEDURE — 3066F NEPHROPATHY DOC TX: CPT | Mod: CPTII,S$GLB,, | Performed by: INTERNAL MEDICINE

## 2022-05-12 PROCEDURE — 99999 PR PBB SHADOW E&M-EST. PATIENT-LVL IV: ICD-10-PCS | Mod: PBBFAC,,, | Performed by: INTERNAL MEDICINE

## 2022-05-12 PROCEDURE — 99999 PR PBB SHADOW E&M-EST. PATIENT-LVL IV: CPT | Mod: PBBFAC,,, | Performed by: INTERNAL MEDICINE

## 2022-05-12 PROCEDURE — 3079F DIAST BP 80-89 MM HG: CPT | Mod: CPTII,S$GLB,, | Performed by: INTERNAL MEDICINE

## 2022-05-12 PROCEDURE — 3051F PR MOST RECENT HEMOGLOBIN A1C LEVEL 7.0 - < 8.0%: ICD-10-PCS | Mod: CPTII,S$GLB,, | Performed by: INTERNAL MEDICINE

## 2022-05-12 PROCEDURE — 3008F PR BODY MASS INDEX (BMI) DOCUMENTED: ICD-10-PCS | Mod: CPTII,S$GLB,, | Performed by: INTERNAL MEDICINE

## 2022-05-12 PROCEDURE — 3066F PR DOCUMENTATION OF TREATMENT FOR NEPHROPATHY: ICD-10-PCS | Mod: CPTII,S$GLB,, | Performed by: INTERNAL MEDICINE

## 2022-05-12 PROCEDURE — 99213 OFFICE O/P EST LOW 20 MIN: CPT | Mod: S$GLB,,, | Performed by: INTERNAL MEDICINE

## 2022-05-12 RX ORDER — METHYLPREDNISOLONE 4 MG/1
TABLET ORAL
Qty: 21 TABLET | Refills: 0 | Status: SHIPPED | OUTPATIENT
Start: 2022-05-12 | End: 2022-09-27

## 2022-05-13 ENCOUNTER — TELEPHONE (OUTPATIENT)
Dept: ENDOSCOPY | Facility: HOSPITAL | Age: 72
End: 2022-05-13
Payer: MEDICARE

## 2022-05-13 ENCOUNTER — INFUSION (OUTPATIENT)
Dept: INFECTIOUS DISEASES | Facility: HOSPITAL | Age: 72
End: 2022-05-13
Attending: INTERNAL MEDICINE
Payer: MEDICARE

## 2022-05-13 VITALS
BODY MASS INDEX: 27.71 KG/M2 | DIASTOLIC BLOOD PRESSURE: 85 MMHG | SYSTOLIC BLOOD PRESSURE: 133 MMHG | WEIGHT: 215.81 LBS | TEMPERATURE: 97 F | HEART RATE: 88 BPM | OXYGEN SATURATION: 97 %

## 2022-05-13 DIAGNOSIS — Z93.3 COLOSTOMY PRESENT: ICD-10-CM

## 2022-05-13 DIAGNOSIS — E86.0 DEHYDRATION: Primary | ICD-10-CM

## 2022-05-13 DIAGNOSIS — N18.32 TYPE 2 DIABETES MELLITUS WITH STAGE 3B CHRONIC KIDNEY DISEASE, WITHOUT LONG-TERM CURRENT USE OF INSULIN: ICD-10-CM

## 2022-05-13 DIAGNOSIS — E11.22 TYPE 2 DIABETES MELLITUS WITH STAGE 3B CHRONIC KIDNEY DISEASE, WITHOUT LONG-TERM CURRENT USE OF INSULIN: ICD-10-CM

## 2022-05-13 PROCEDURE — 25000003 PHARM REV CODE 250: Performed by: NURSE PRACTITIONER

## 2022-05-13 PROCEDURE — 96360 HYDRATION IV INFUSION INIT: CPT

## 2022-05-13 RX ORDER — HEPARIN 100 UNIT/ML
500 SYRINGE INTRAVENOUS
Status: CANCELLED | OUTPATIENT
Start: 2022-05-27

## 2022-05-13 RX ORDER — SODIUM CHLORIDE 0.9 % (FLUSH) 0.9 %
10 SYRINGE (ML) INJECTION
Status: CANCELLED | OUTPATIENT
Start: 2022-05-27

## 2022-05-13 RX ADMIN — SODIUM CHLORIDE 1000 ML: 0.9 INJECTION, SOLUTION INTRAVENOUS at 09:05

## 2022-05-13 NOTE — TELEPHONE ENCOUNTER
----- Message from Alma Britt sent at 5/13/2022  1:16 PM CDT -----  Regarding: pt called  Name of Who is Calling: PEGGY BELLA JR. [[id number]]      What is the request in detail: pt is requesting to reschedule his appt. Please advise       Can the clinic reply by [facility]: No       What Number to Call Back if not in [facility]: [phone]

## 2022-05-14 NOTE — PROGRESS NOTES
History of present illness:  72-year-old gentleman who has been followed in the rheumatology department since 2004. He was originally seen by Dr. Darby and then in 2009 he was seen by Dr. Steward.  I have been following him since 2010. He has chronic gouty arthritis.  He has been on allopurinol 450 mg daily.  He has a history of osteoarthritis, especially of his hands.  He also has also of colitis and has had previous surgical procedures.  He has frequent hospitalizations for dehydration.    I saw him last in November.  He was having pain in the right wrist.  He had a de Quervain's tendinitis.  I injected the area and the pain resolved.    He comes in now because of pain in his left 3rd finger.  This is been going on for the past 2 weeks.  He has no history of antecedent trauma.  He has some trouble bending the finger.  He denies any swelling in the finger.  He has had no numbness or tingling.  He has had no problems with his other fingers.  He has some pain in his legs with change of position but no other peripheral joint complaints.  He denies any pain in the lower back.    He has tried Voltaren gel without any response.  He has been soaking his hands.    He still has problems with frequent dehydration.  He remains on gabapentin for his peripheral neuropathy.  This does help the neuropathy.    Physical examination:  Musculoskeletal:  He has bony hypertrophy of the PIP knees and D IP he has.  He has tenderness of the flexor tendon of the left 3rd finger.  He has no nodules or thickening of the tendon.  He has good range of motion of the hand.    Assessment:  1. Underlying osteoarthritis  2. Inter critical gout  3. Tendinitis of the 3rd finger    Plans:    1. I did not find an area that I felt comfortable injecting side I placed him on a Medrol Dosepak.  If his symptoms do not improve, I will refer him to Hand surgery  2. Laboratory studies obtained  3. Return in 12 months

## 2022-05-18 ENCOUNTER — OFFICE VISIT (OUTPATIENT)
Dept: PRIMARY CARE CLINIC | Facility: CLINIC | Age: 72
End: 2022-05-18
Payer: MEDICARE

## 2022-05-18 ENCOUNTER — TELEPHONE (OUTPATIENT)
Dept: PRIMARY CARE CLINIC | Facility: CLINIC | Age: 72
End: 2022-05-18
Payer: MEDICARE

## 2022-05-18 VITALS
HEART RATE: 77 BPM | RESPIRATION RATE: 16 BRPM | WEIGHT: 215.38 LBS | BODY MASS INDEX: 27.64 KG/M2 | HEIGHT: 74 IN | DIASTOLIC BLOOD PRESSURE: 80 MMHG | OXYGEN SATURATION: 96 % | SYSTOLIC BLOOD PRESSURE: 134 MMHG

## 2022-05-18 DIAGNOSIS — R42 DIZZINESS: ICD-10-CM

## 2022-05-18 DIAGNOSIS — I10 ESSENTIAL HYPERTENSION: ICD-10-CM

## 2022-05-18 DIAGNOSIS — N28.9 KIDNEY LESION, NATIVE, LEFT: ICD-10-CM

## 2022-05-18 DIAGNOSIS — K91.1 DUMPING SYNDROME: Primary | ICD-10-CM

## 2022-05-18 DIAGNOSIS — M77.8 TENDINITIS OF FINGER OF LEFT HAND: ICD-10-CM

## 2022-05-18 PROCEDURE — 1159F PR MEDICATION LIST DOCUMENTED IN MEDICAL RECORD: ICD-10-PCS | Mod: CPTII,S$GLB,, | Performed by: INTERNAL MEDICINE

## 2022-05-18 PROCEDURE — 99214 OFFICE O/P EST MOD 30 MIN: CPT | Mod: S$GLB,,, | Performed by: INTERNAL MEDICINE

## 2022-05-18 PROCEDURE — 3051F HG A1C>EQUAL 7.0%<8.0%: CPT | Mod: CPTII,S$GLB,, | Performed by: INTERNAL MEDICINE

## 2022-05-18 PROCEDURE — 1101F PR PT FALLS ASSESS DOC 0-1 FALLS W/OUT INJ PAST YR: ICD-10-PCS | Mod: CPTII,S$GLB,, | Performed by: INTERNAL MEDICINE

## 2022-05-18 PROCEDURE — 3288F PR FALLS RISK ASSESSMENT DOCUMENTED: ICD-10-PCS | Mod: CPTII,S$GLB,, | Performed by: INTERNAL MEDICINE

## 2022-05-18 PROCEDURE — 99999 PR PBB SHADOW E&M-EST. PATIENT-LVL V: CPT | Mod: PBBFAC,,, | Performed by: INTERNAL MEDICINE

## 2022-05-18 PROCEDURE — 3075F PR MOST RECENT SYSTOLIC BLOOD PRESS GE 130-139MM HG: ICD-10-PCS | Mod: CPTII,S$GLB,, | Performed by: INTERNAL MEDICINE

## 2022-05-18 PROCEDURE — 3008F PR BODY MASS INDEX (BMI) DOCUMENTED: ICD-10-PCS | Mod: CPTII,S$GLB,, | Performed by: INTERNAL MEDICINE

## 2022-05-18 PROCEDURE — 3079F PR MOST RECENT DIASTOLIC BLOOD PRESSURE 80-89 MM HG: ICD-10-PCS | Mod: CPTII,S$GLB,, | Performed by: INTERNAL MEDICINE

## 2022-05-18 PROCEDURE — 1101F PT FALLS ASSESS-DOCD LE1/YR: CPT | Mod: CPTII,S$GLB,, | Performed by: INTERNAL MEDICINE

## 2022-05-18 PROCEDURE — 3066F PR DOCUMENTATION OF TREATMENT FOR NEPHROPATHY: ICD-10-PCS | Mod: CPTII,S$GLB,, | Performed by: INTERNAL MEDICINE

## 2022-05-18 PROCEDURE — 99214 PR OFFICE/OUTPT VISIT, EST, LEVL IV, 30-39 MIN: ICD-10-PCS | Mod: S$GLB,,, | Performed by: INTERNAL MEDICINE

## 2022-05-18 PROCEDURE — 3079F DIAST BP 80-89 MM HG: CPT | Mod: CPTII,S$GLB,, | Performed by: INTERNAL MEDICINE

## 2022-05-18 PROCEDURE — 1125F PR PAIN SEVERITY QUANTIFIED, PAIN PRESENT: ICD-10-PCS | Mod: CPTII,S$GLB,, | Performed by: INTERNAL MEDICINE

## 2022-05-18 PROCEDURE — 1160F PR REVIEW ALL MEDS BY PRESCRIBER/CLIN PHARMACIST DOCUMENTED: ICD-10-PCS | Mod: CPTII,S$GLB,, | Performed by: INTERNAL MEDICINE

## 2022-05-18 PROCEDURE — 1125F AMNT PAIN NOTED PAIN PRSNT: CPT | Mod: CPTII,S$GLB,, | Performed by: INTERNAL MEDICINE

## 2022-05-18 PROCEDURE — 3066F NEPHROPATHY DOC TX: CPT | Mod: CPTII,S$GLB,, | Performed by: INTERNAL MEDICINE

## 2022-05-18 PROCEDURE — 3075F SYST BP GE 130 - 139MM HG: CPT | Mod: CPTII,S$GLB,, | Performed by: INTERNAL MEDICINE

## 2022-05-18 PROCEDURE — 3051F PR MOST RECENT HEMOGLOBIN A1C LEVEL 7.0 - < 8.0%: ICD-10-PCS | Mod: CPTII,S$GLB,, | Performed by: INTERNAL MEDICINE

## 2022-05-18 PROCEDURE — 3008F BODY MASS INDEX DOCD: CPT | Mod: CPTII,S$GLB,, | Performed by: INTERNAL MEDICINE

## 2022-05-18 PROCEDURE — 3288F FALL RISK ASSESSMENT DOCD: CPT | Mod: CPTII,S$GLB,, | Performed by: INTERNAL MEDICINE

## 2022-05-18 PROCEDURE — 1159F MED LIST DOCD IN RCRD: CPT | Mod: CPTII,S$GLB,, | Performed by: INTERNAL MEDICINE

## 2022-05-18 PROCEDURE — 99999 PR PBB SHADOW E&M-EST. PATIENT-LVL V: ICD-10-PCS | Mod: PBBFAC,,, | Performed by: INTERNAL MEDICINE

## 2022-05-18 PROCEDURE — 1160F RVW MEDS BY RX/DR IN RCRD: CPT | Mod: CPTII,S$GLB,, | Performed by: INTERNAL MEDICINE

## 2022-05-18 RX ORDER — MECLIZINE HCL 12.5 MG 12.5 MG/1
12.5 TABLET ORAL 3 TIMES DAILY PRN
Qty: 30 TABLET | Refills: 3 | Status: SHIPPED | OUTPATIENT
Start: 2022-05-18 | End: 2022-09-27

## 2022-05-18 NOTE — TELEPHONE ENCOUNTER
----- Message from Vesta England sent at 5/18/2022 12:21 PM CDT -----  Contact: [phone] @ Patient  Good Afternoon  Patient state that Dr forgot to fill his Rx. Patient said to let Dr know he can forget about filling it    Thank you

## 2022-05-19 ENCOUNTER — OUTPATIENT CASE MANAGEMENT (OUTPATIENT)
Dept: ADMINISTRATIVE | Facility: OTHER | Age: 72
End: 2022-05-19
Payer: MEDICARE

## 2022-05-19 NOTE — PROGRESS NOTES
Subjective:       Patient ID: Jarrett Charlton Jr. is a 72 y.o. male.    Chief Complaint: Follow-up (4 wk) and Medication Refill    HPI pt visit today for f/u and refill meds he is getting [facility]  A lot of time drink liquid then went to bathroom rt right no fever chill no n/vd/ no sob cp no hair loss and left middle finger with tenderness atbase and contrated sometime. Halso c/o still having dizziness on and off  antivert help  Review of Systems    Objective:      Physical Exam  Vitals and nursing note reviewed.   Constitutional:       General: He is not in acute distress.     Appearance: He is well-developed.   HENT:      Head: Normocephalic and atraumatic.      Right Ear: External ear normal.      Left Ear: External ear normal.      Nose: Nose normal.      Mouth/Throat:      Pharynx: No oropharyngeal exudate.   Eyes:      Extraocular Movements: Extraocular movements intact.      Conjunctiva/sclera: Conjunctivae normal.      Pupils: Pupils are equal, round, and reactive to light.   Neck:      Thyroid: No thyromegaly.   Cardiovascular:      Rate and Rhythm: Normal rate and regular rhythm.      Heart sounds: Normal heart sounds. No murmur heard.    No friction rub. No gallop.   Pulmonary:      Effort: Pulmonary effort is normal. No respiratory distress.      Breath sounds: Normal breath sounds. No wheezing or rales.   Abdominal:      General: Bowel sounds are normal. There is no distension.      Palpations: Abdomen is soft.      Tenderness: There is no abdominal tenderness. There is no guarding.   Musculoskeletal:         General: No tenderness or deformity. Normal range of motion.      Cervical back: Normal range of motion and neck supple.   Lymphadenopathy:      Cervical: No cervical adenopathy.   Skin:     General: Skin is warm and dry.      Findings: No erythema or rash.   Neurological:      Mental Status: He is alert and oriented to person, place, and time.   Psychiatric:         Mood and Affect: Mood  normal.         Thought Content: Thought content normal.         Judgment: Judgment normal.         Assessment:       1. Dumping syndrome    2. Dizziness    3. Tendinitis of finger of left hand    4. Kidney lesion, native, left    5. Essential hypertension        Plan:       Dumping syndrome  Comments:  pt is getting weekly IVF    Dizziness  -     meclizine (ANTIVERT) 12.5 mg tablet; Take 1 tablet (12.5 mg total) by mouth 3 (three) times daily as needed for Dizziness.  Dispense: 30 tablet; Refill: 3    Tendinitis of finger of left hand  Comments:  try steroid po as per orthopedist    Kidney lesion, native, left  -     US Kidney; Future; Expected date: 05/18/2022    Essential hypertension        Medication List with Changes/Refills   New Medications    MECLIZINE (ANTIVERT) 12.5 MG TABLET    Take 1 tablet (12.5 mg total) by mouth 3 (three) times daily as needed for Dizziness.   Current Medications    ACCU-CHEK PAUL CONTROL SOLN SOLN    1 drop by NOT APPLICABLE route once daily.    ACCU-CHEK SOFTCLIX LANCETS MISC    1 lancet by NOT APPLICABLE route once daily.    ALLOPURINOL (ZYLOPRIM) 300 MG TABLET    Take 1.5 tablets (450 mg total) by mouth once daily.    ALPRAZOLAM (XANAX) 0.5 MG TABLET    Take 0.5 mg by mouth nightly as needed for Anxiety.    AMIODARONE (PACERONE) 200 MG TAB    Take 1 tablet (200 mg total) by mouth 2 (two) times daily.    AMLODIPINE (NORVASC) 5 MG TABLET    Take 5 mg by mouth once daily.    ASPIRIN (ECOTRIN) 81 MG EC TABLET    Take 81 mg by mouth once daily.    BD ALCOHOL SWABS PADM        BLOOD SUGAR DIAGNOSTIC (ONE TOUCH ULTRA TEST) [facility]    USE ONE STRIP TO CHECK GLUCOSE EVERY DAY    BRIMONIDINE 0.2% (ALPHAGAN) 0.2 % DROP    INSTILL 1 DROP INTO EACH EYE TWICE DAILY    CALCIUM CITRATE-VITAMIN D3 (CITRACAL + D MAXIMUM) 315 MG-6.25 MCG (250 UNIT) TAB    Take 1 tablet by mouth once daily.    CARVEDILOL (COREG) 12.5 MG TABLET    Take 12.5 mg by mouth 2 (two) times daily with meals.    CLOPIDOGREL  (PLAVIX) 75 MG TABLET    Take 75 mg by mouth once daily.    DROXIDOPA 100 MG CAP    Take 2 capsules by mouth 2 (two) times a day. For Low blood pressure    FIBER, CALCIUM POLYCARBOPHIL, 625 MG TABLET    Take 3 tablets by mouth before dinner.    FLUDROCORTISONE (FLORINEF) 0.1 MG TAB        GABAPENTIN (NEURONTIN) 600 MG TABLET    Take 1 tablet (600 mg total) by mouth 2 (two) times daily.    GLIMEPIRIDE (AMARYL) 4 MG TABLET    Take 4 mg by mouth 2 (two) times daily before meals.    HYDROCODONE-ACETAMINOPHEN (NORCO) 5-325 MG PER TABLET    Take 1 tablet by mouth every 12 (twelve) hours as needed for Pain.    MAGNESIUM OXIDE (MAG-OX) 400 MG (241.3 MG MAGNESIUM) TABLET    Take 400 mg by mouth once daily.    METHYLPREDNISOLONE (MEDROL DOSEPACK) 4 MG TABLET    use as directed    PANTOPRAZOLE (PROTONIX) 40 MG TABLET    Take 40 mg by mouth once daily.    PRAVASTATIN (PRAVACHOL) 40 MG TABLET    Take 1 tablet (40 mg total) by mouth once daily.

## 2022-05-26 ENCOUNTER — INFUSION (OUTPATIENT)
Dept: INFECTIOUS DISEASES | Facility: HOSPITAL | Age: 72
End: 2022-05-26
Attending: INTERNAL MEDICINE
Payer: MEDICARE

## 2022-05-26 VITALS
DIASTOLIC BLOOD PRESSURE: 74 MMHG | TEMPERATURE: 97 F | WEIGHT: 213.5 LBS | SYSTOLIC BLOOD PRESSURE: 135 MMHG | BODY MASS INDEX: 27.41 KG/M2 | HEART RATE: 65 BPM | OXYGEN SATURATION: 98 %

## 2022-05-26 DIAGNOSIS — E11.22 TYPE 2 DIABETES MELLITUS WITH STAGE 3B CHRONIC KIDNEY DISEASE, WITHOUT LONG-TERM CURRENT USE OF INSULIN: ICD-10-CM

## 2022-05-26 DIAGNOSIS — E86.0 DEHYDRATION: Primary | ICD-10-CM

## 2022-05-26 DIAGNOSIS — Z93.3 COLOSTOMY PRESENT: ICD-10-CM

## 2022-05-26 DIAGNOSIS — N18.32 TYPE 2 DIABETES MELLITUS WITH STAGE 3B CHRONIC KIDNEY DISEASE, WITHOUT LONG-TERM CURRENT USE OF INSULIN: ICD-10-CM

## 2022-05-26 PROCEDURE — 25000003 PHARM REV CODE 250: Performed by: NURSE PRACTITIONER

## 2022-05-26 PROCEDURE — 96360 HYDRATION IV INFUSION INIT: CPT

## 2022-05-26 RX ORDER — SODIUM CHLORIDE 0.9 % (FLUSH) 0.9 %
10 SYRINGE (ML) INJECTION
Status: CANCELLED | OUTPATIENT
Start: 2022-05-27

## 2022-05-26 RX ORDER — HEPARIN 100 UNIT/ML
500 SYRINGE INTRAVENOUS
Status: CANCELLED | OUTPATIENT
Start: 2022-05-27

## 2022-05-26 RX ADMIN — SODIUM CHLORIDE 1000 ML: 0.9 INJECTION, SOLUTION INTRAVENOUS at 10:05

## 2022-05-31 ENCOUNTER — TELEPHONE (OUTPATIENT)
Dept: GASTROENTEROLOGY | Facility: CLINIC | Age: 72
End: 2022-05-31
Payer: MEDICARE

## 2022-05-31 NOTE — TELEPHONE ENCOUNTER
Spoke to patient to let him know we needed to cancel his appointment for tomorrow with NICKY Bellamy because she doesn't see patient with ulcers collitis  And that he needs to follow up with a main campus doctor. Patient stated that he been trying to get in with one of the docs over there but is a wait until 3 month and he sated as well he didn't want to come all away to [place]. I told patient NICKY Bellamy is sending a message to the staff over there to see if they can make him an appointment with one of there doctors sooner, verbal understanding.

## 2022-06-02 DIAGNOSIS — E11.9 TYPE 2 DIABETES MELLITUS WITHOUT COMPLICATION: ICD-10-CM

## 2022-06-10 ENCOUNTER — INFUSION (OUTPATIENT)
Dept: INFECTIOUS DISEASES | Facility: HOSPITAL | Age: 72
End: 2022-06-10
Attending: INTERNAL MEDICINE
Payer: MEDICARE

## 2022-06-10 ENCOUNTER — TELEPHONE (OUTPATIENT)
Dept: PRIMARY CARE CLINIC | Facility: CLINIC | Age: 72
End: 2022-06-10
Payer: MEDICARE

## 2022-06-10 VITALS
RESPIRATION RATE: 18 BRPM | HEART RATE: 83 BPM | TEMPERATURE: 99 F | DIASTOLIC BLOOD PRESSURE: 83 MMHG | WEIGHT: 217.13 LBS | SYSTOLIC BLOOD PRESSURE: 154 MMHG | BODY MASS INDEX: 27.88 KG/M2 | OXYGEN SATURATION: 96 %

## 2022-06-10 DIAGNOSIS — E11.22 TYPE 2 DIABETES MELLITUS WITH STAGE 3B CHRONIC KIDNEY DISEASE, WITHOUT LONG-TERM CURRENT USE OF INSULIN: ICD-10-CM

## 2022-06-10 DIAGNOSIS — E86.0 DEHYDRATION: Primary | ICD-10-CM

## 2022-06-10 DIAGNOSIS — Z93.3 COLOSTOMY PRESENT: ICD-10-CM

## 2022-06-10 DIAGNOSIS — N18.32 TYPE 2 DIABETES MELLITUS WITH STAGE 3B CHRONIC KIDNEY DISEASE, WITHOUT LONG-TERM CURRENT USE OF INSULIN: ICD-10-CM

## 2022-06-10 PROCEDURE — 96360 HYDRATION IV INFUSION INIT: CPT

## 2022-06-10 PROCEDURE — 25000003 PHARM REV CODE 250: Performed by: NURSE PRACTITIONER

## 2022-06-10 RX ORDER — SODIUM CHLORIDE 0.9 % (FLUSH) 0.9 %
10 SYRINGE (ML) INJECTION
Status: CANCELLED | OUTPATIENT
Start: 2022-06-23

## 2022-06-10 RX ORDER — HEPARIN 100 UNIT/ML
500 SYRINGE INTRAVENOUS
Status: CANCELLED | OUTPATIENT
Start: 2022-06-23

## 2022-06-10 RX ADMIN — SODIUM CHLORIDE 1000 ML: 0.9 INJECTION, SOLUTION INTRAVENOUS at 09:06

## 2022-06-10 NOTE — TELEPHONE ENCOUNTER
----- Message from Zara Arguello sent at 6/10/2022  1:26 PM CDT -----  Contact: [phone]  Type: Test Results    What test was performed?US RETROPER     Who ordered the test?RIYA CLEMENTE    When and where were the test performed?  6/9    Would you like response via Be[employer]:     Comments:

## 2022-06-20 ENCOUNTER — TELEPHONE (OUTPATIENT)
Dept: NEPHROLOGY | Facility: CLINIC | Age: 72
End: 2022-06-20
Payer: MEDICARE

## 2022-06-24 ENCOUNTER — INFUSION (OUTPATIENT)
Dept: INFECTIOUS DISEASES | Facility: HOSPITAL | Age: 72
End: 2022-06-24
Attending: INTERNAL MEDICINE
Payer: MEDICARE

## 2022-06-24 VITALS
WEIGHT: 215.81 LBS | HEART RATE: 74 BPM | SYSTOLIC BLOOD PRESSURE: 135 MMHG | RESPIRATION RATE: 20 BRPM | DIASTOLIC BLOOD PRESSURE: 82 MMHG | BODY MASS INDEX: 27.7 KG/M2 | HEIGHT: 74 IN | OXYGEN SATURATION: 97 % | TEMPERATURE: 97 F

## 2022-06-24 DIAGNOSIS — N18.32 TYPE 2 DIABETES MELLITUS WITH STAGE 3B CHRONIC KIDNEY DISEASE, WITHOUT LONG-TERM CURRENT USE OF INSULIN: ICD-10-CM

## 2022-06-24 DIAGNOSIS — E11.22 TYPE 2 DIABETES MELLITUS WITH STAGE 3B CHRONIC KIDNEY DISEASE, WITHOUT LONG-TERM CURRENT USE OF INSULIN: ICD-10-CM

## 2022-06-24 DIAGNOSIS — Z93.3 COLOSTOMY PRESENT: ICD-10-CM

## 2022-06-24 DIAGNOSIS — E86.0 DEHYDRATION: Primary | ICD-10-CM

## 2022-06-24 PROCEDURE — 25000003 PHARM REV CODE 250: Performed by: NURSE PRACTITIONER

## 2022-06-24 PROCEDURE — 96360 HYDRATION IV INFUSION INIT: CPT

## 2022-06-24 RX ORDER — HEPARIN 100 UNIT/ML
500 SYRINGE INTRAVENOUS
Status: CANCELLED | OUTPATIENT
Start: 2022-07-08

## 2022-06-24 RX ORDER — SODIUM CHLORIDE 0.9 % (FLUSH) 0.9 %
10 SYRINGE (ML) INJECTION
Status: DISCONTINUED | OUTPATIENT
Start: 2022-06-24 | End: 2022-06-24 | Stop reason: HOSPADM

## 2022-06-24 RX ORDER — SODIUM CHLORIDE 0.9 % (FLUSH) 0.9 %
10 SYRINGE (ML) INJECTION
Status: CANCELLED | OUTPATIENT
Start: 2022-07-08

## 2022-06-24 RX ADMIN — SODIUM CHLORIDE 1000 ML: 0.9 INJECTION, SOLUTION INTRAVENOUS at 10:06

## 2022-06-24 NOTE — PLAN OF CARE
Problem: Fall Injury Risk  Goal: Absence of Fall and Fall-Related Injury  Outcome: Ongoing, Progressing  Intervention: Identify and Manage Contributors  Flowsheets (Taken 6/24/2022 1054)  Self-Care Promotion: independence encouraged  Medication Review/Management: medications reviewed

## 2022-06-27 ENCOUNTER — TELEPHONE (OUTPATIENT)
Dept: PRIMARY CARE CLINIC | Facility: CLINIC | Age: 72
End: 2022-06-27
Payer: MEDICARE

## 2022-06-27 NOTE — TELEPHONE ENCOUNTER
Pt states he has a rash. Called last week but no one called him back. Pt very upset. No available apt this week pt refused apt for next week

## 2022-06-27 NOTE — TELEPHONE ENCOUNTER
----- Message from Hue Simmons sent at 6/27/2022  2:14 PM CDT -----  Contact: Pt Mobile [phone]  Patient said he sent you a message on last week to return his call but he never got any response. Patient said do not call him.

## 2022-07-08 ENCOUNTER — TELEPHONE (OUTPATIENT)
Dept: NEPHROLOGY | Facility: CLINIC | Age: 72
End: 2022-07-08
Payer: MEDICARE

## 2022-07-08 ENCOUNTER — INFUSION (OUTPATIENT)
Dept: INFECTIOUS DISEASES | Facility: HOSPITAL | Age: 72
End: 2022-07-08
Attending: INTERNAL MEDICINE
Payer: MEDICARE

## 2022-07-08 VITALS
HEART RATE: 63 BPM | TEMPERATURE: 97 F | HEIGHT: 74 IN | SYSTOLIC BLOOD PRESSURE: 117 MMHG | WEIGHT: 216.94 LBS | BODY MASS INDEX: 27.84 KG/M2 | DIASTOLIC BLOOD PRESSURE: 70 MMHG

## 2022-07-08 DIAGNOSIS — E86.0 DEHYDRATION: Primary | ICD-10-CM

## 2022-07-08 DIAGNOSIS — Z93.3 COLOSTOMY PRESENT: ICD-10-CM

## 2022-07-08 DIAGNOSIS — N18.32 TYPE 2 DIABETES MELLITUS WITH STAGE 3B CHRONIC KIDNEY DISEASE, WITHOUT LONG-TERM CURRENT USE OF INSULIN: ICD-10-CM

## 2022-07-08 DIAGNOSIS — E11.22 TYPE 2 DIABETES MELLITUS WITH STAGE 3B CHRONIC KIDNEY DISEASE, WITHOUT LONG-TERM CURRENT USE OF INSULIN: ICD-10-CM

## 2022-07-08 PROCEDURE — 25000003 PHARM REV CODE 250: Performed by: NURSE PRACTITIONER

## 2022-07-08 PROCEDURE — 96360 HYDRATION IV INFUSION INIT: CPT

## 2022-07-08 RX ORDER — HEPARIN 100 UNIT/ML
500 SYRINGE INTRAVENOUS
Status: DISCONTINUED | OUTPATIENT
Start: 2022-07-08 | End: 2022-07-08 | Stop reason: HOSPADM

## 2022-07-08 RX ORDER — SODIUM CHLORIDE 0.9 % (FLUSH) 0.9 %
10 SYRINGE (ML) INJECTION
Status: DISCONTINUED | OUTPATIENT
Start: 2022-07-08 | End: 2022-07-08 | Stop reason: HOSPADM

## 2022-07-08 RX ORDER — HEPARIN 100 UNIT/ML
500 SYRINGE INTRAVENOUS
Status: CANCELLED | OUTPATIENT
Start: 2022-07-15

## 2022-07-08 RX ORDER — SODIUM CHLORIDE 0.9 % (FLUSH) 0.9 %
10 SYRINGE (ML) INJECTION
Status: CANCELLED | OUTPATIENT
Start: 2022-07-15

## 2022-07-08 RX ADMIN — SODIUM CHLORIDE 1000 ML: 0.9 INJECTION, SOLUTION INTRAVENOUS at 10:07

## 2022-07-08 NOTE — TELEPHONE ENCOUNTER
Bianka Arevalo, NICKY Chisholm MA  Caller: pt@ [phone] (Yesterday,  4:31 PM)  I see he's getting labs done already. Thanks.     Let him know we can discuss further on Monday at his appt about getting weekly infusions. Likely won't be an issue, and I will plan for it now, in hopes he can get a timeslot next week, though I have more questions for him about his BP levels when he went to ER etc.     Thanks,   Bianka Gill

## 2022-07-08 NOTE — PROGRESS NOTES
Pt received 1 liter NS [id number], pt tolerated well & left in NAD, next appt made           [FreeTextEntry1] : Patient is a 65-year-old man with history of type 2 diabetes, hyperlipidemia, hypertension, vitamin D deficiency, here for endocrinology follow-up.\par \par 1.  Type 2 diabetes\par A1c today 8.2% 07/08/2022 \par Patient reports a normal stress from his work, works at an . \par Recommend to continue with Metformin 1000 twice daily\par Recommend to increase Trulicity to 4.5 mg once weekly.\par Jardiance 25mg once daily  \par Discussed carb consistent diet\par Exercise: Discussed the regular exercises are beneficial in type 2 diabetes, independent of weight loss.  Encouraged to decrease sedentary time and perform 30 to 60 minutes of moderate intensity aerobic exercise on most days of the week, at least 150 minutes of moderate intensity aerobic exercise per week.\par Recommend to check glucose fasting, and 2 hours postprandially on certain meals to see any of the fluid content is spiking his glucose to above 180 mg/dL.\par Ophthalmology: Up-to-date\par Podiatry: Up-to-date follows up with Dr. Levin\par \par 2.  Hyperlipidemia\par LDL level is 48 December 4, 2020\par Triglyceride elevated at 283 mg/dL\par Continue with statin therapy Lipitor 40 mg\par \par 3.  Vitamin D deficiency\par Vitamin D 25 28 ng/ml\par Recommend Vitamin D 2000 IU daily.  \par \par Follow-up in 4 months for close monitoring of A1c\par \par  [Diabetes Foot Care] : diabetes foot care [Long Term Vascular Complications] : long term vascular complications of diabetes [Carbohydrate Consistent Diet] : carbohydrate consistent diet [Importance of Diet and Exercise] : importance of diet and exercise to improve glycemic control, achieve weight loss and improve cardiovascular health [Exercise/Effect on Glucose] : exercise/effect on glucose [Hypoglycemia Management] : hypoglycemia management [Glucagon Use] : glucagon use [Ketone Testing] : ketone testing [Action and use of Insulin] : action and use of short and long-acting insulin [Self Monitoring of Blood Glucose] : self monitoring of blood glucose [Insulin Self-Administration] : insulin self-administration [Injection Technique, Storage, Sharps Disposal] : injection technique, storage, and sharps disposal [Sick-Day Management] : sick-day management [Retinopathy Screening] : Patient was referred to ophthalmology for retinopathy screening

## 2022-07-10 ENCOUNTER — PATIENT MESSAGE (OUTPATIENT)
Dept: RHEUMATOLOGY | Facility: CLINIC | Age: 72
End: 2022-07-10
Payer: MEDICARE

## 2022-07-11 ENCOUNTER — OFFICE VISIT (OUTPATIENT)
Dept: NEPHROLOGY | Facility: CLINIC | Age: 72
End: 2022-07-11
Payer: MEDICARE

## 2022-07-11 VITALS
DIASTOLIC BLOOD PRESSURE: 91 MMHG | WEIGHT: 216.94 LBS | SYSTOLIC BLOOD PRESSURE: 135 MMHG | HEART RATE: 74 BPM | HEIGHT: 74 IN | BODY MASS INDEX: 27.84 KG/M2 | OXYGEN SATURATION: 97 %

## 2022-07-11 DIAGNOSIS — E11.22 TYPE 2 DIABETES MELLITUS WITH STAGE 3B CHRONIC KIDNEY DISEASE, UNSPECIFIED WHETHER LONG TERM INSULIN USE: Primary | ICD-10-CM

## 2022-07-11 DIAGNOSIS — N17.9 AKI (ACUTE KIDNEY INJURY): ICD-10-CM

## 2022-07-11 DIAGNOSIS — N18.32 TYPE 2 DIABETES MELLITUS WITH STAGE 3B CHRONIC KIDNEY DISEASE, UNSPECIFIED WHETHER LONG TERM INSULIN USE: Primary | ICD-10-CM

## 2022-07-11 DIAGNOSIS — I10 HYPERTENSION, UNSPECIFIED TYPE: ICD-10-CM

## 2022-07-11 DIAGNOSIS — E87.5 HYPERKALEMIA: ICD-10-CM

## 2022-07-11 DIAGNOSIS — R09.89 LABILE BLOOD PRESSURE: ICD-10-CM

## 2022-07-11 PROCEDURE — 99999 PR PBB SHADOW E&M-EST. PATIENT-LVL IV: ICD-10-PCS | Mod: PBBFAC,,, | Performed by: NURSE PRACTITIONER

## 2022-07-11 PROCEDURE — 1159F MED LIST DOCD IN RCRD: CPT | Mod: CPTII,S$GLB,, | Performed by: NURSE PRACTITIONER

## 2022-07-11 PROCEDURE — 1159F PR MEDICATION LIST DOCUMENTED IN MEDICAL RECORD: ICD-10-PCS | Mod: CPTII,S$GLB,, | Performed by: NURSE PRACTITIONER

## 2022-07-11 PROCEDURE — 99215 OFFICE O/P EST HI 40 MIN: CPT | Mod: S$GLB,,, | Performed by: NURSE PRACTITIONER

## 2022-07-11 PROCEDURE — 3288F FALL RISK ASSESSMENT DOCD: CPT | Mod: CPTII,S$GLB,, | Performed by: NURSE PRACTITIONER

## 2022-07-11 PROCEDURE — 1126F PR PAIN SEVERITY QUANTIFIED, NO PAIN PRESENT: ICD-10-PCS | Mod: CPTII,S$GLB,, | Performed by: NURSE PRACTITIONER

## 2022-07-11 PROCEDURE — 99215 PR OFFICE/OUTPT VISIT, EST, LEVL V, 40-54 MIN: ICD-10-PCS | Mod: S$GLB,,, | Performed by: NURSE PRACTITIONER

## 2022-07-11 PROCEDURE — 3008F PR BODY MASS INDEX (BMI) DOCUMENTED: ICD-10-PCS | Mod: CPTII,S$GLB,, | Performed by: NURSE PRACTITIONER

## 2022-07-11 PROCEDURE — 1126F AMNT PAIN NOTED NONE PRSNT: CPT | Mod: CPTII,S$GLB,, | Performed by: NURSE PRACTITIONER

## 2022-07-11 PROCEDURE — 1101F PR PT FALLS ASSESS DOC 0-1 FALLS W/OUT INJ PAST YR: ICD-10-PCS | Mod: CPTII,S$GLB,, | Performed by: NURSE PRACTITIONER

## 2022-07-11 PROCEDURE — 3080F PR MOST RECENT DIASTOLIC BLOOD PRESSURE >= 90 MM HG: ICD-10-PCS | Mod: CPTII,S$GLB,, | Performed by: NURSE PRACTITIONER

## 2022-07-11 PROCEDURE — 3066F PR DOCUMENTATION OF TREATMENT FOR NEPHROPATHY: ICD-10-PCS | Mod: CPTII,S$GLB,, | Performed by: NURSE PRACTITIONER

## 2022-07-11 PROCEDURE — 3051F HG A1C>EQUAL 7.0%<8.0%: CPT | Mod: CPTII,S$GLB,, | Performed by: NURSE PRACTITIONER

## 2022-07-11 PROCEDURE — 3075F PR MOST RECENT SYSTOLIC BLOOD PRESS GE 130-139MM HG: ICD-10-PCS | Mod: CPTII,S$GLB,, | Performed by: NURSE PRACTITIONER

## 2022-07-11 PROCEDURE — 3051F PR MOST RECENT HEMOGLOBIN A1C LEVEL 7.0 - < 8.0%: ICD-10-PCS | Mod: CPTII,S$GLB,, | Performed by: NURSE PRACTITIONER

## 2022-07-11 PROCEDURE — 99999 PR PBB SHADOW E&M-EST. PATIENT-LVL IV: CPT | Mod: PBBFAC,,, | Performed by: NURSE PRACTITIONER

## 2022-07-11 PROCEDURE — 3080F DIAST BP >= 90 MM HG: CPT | Mod: CPTII,S$GLB,, | Performed by: NURSE PRACTITIONER

## 2022-07-11 PROCEDURE — 1101F PT FALLS ASSESS-DOCD LE1/YR: CPT | Mod: CPTII,S$GLB,, | Performed by: NURSE PRACTITIONER

## 2022-07-11 PROCEDURE — 3075F SYST BP GE 130 - 139MM HG: CPT | Mod: CPTII,S$GLB,, | Performed by: NURSE PRACTITIONER

## 2022-07-11 PROCEDURE — 3008F BODY MASS INDEX DOCD: CPT | Mod: CPTII,S$GLB,, | Performed by: NURSE PRACTITIONER

## 2022-07-11 PROCEDURE — 3066F NEPHROPATHY DOC TX: CPT | Mod: CPTII,S$GLB,, | Performed by: NURSE PRACTITIONER

## 2022-07-11 PROCEDURE — 3288F PR FALLS RISK ASSESSMENT DOCUMENTED: ICD-10-PCS | Mod: CPTII,S$GLB,, | Performed by: NURSE PRACTITIONER

## 2022-07-11 NOTE — Clinical Note
Pls call patient and inform him that I spoke with Dr. Aguirre. She recommended not taking carvedilol (coreg) unless top number of BP is 130-150.  Do not take amlodipine unless top number of BP is 150-170.  Pls also fax note to cardiologist Dr. Dao. Thanks

## 2022-07-11 NOTE — Clinical Note
Pls also notify him that kidneys look about the same. Hopefully the weekly infusions, the BP med changes, and the lower ostomy output he is experiencing will give his kidneys a chance to recover further. Let's repeat labs Friday after his infusion.

## 2022-07-11 NOTE — Clinical Note
Shady Baum, [facility] patient. He saw you once for diarrhea and said he had trouble getting a follow up. He continues to have diarrhea episodes leaving to hypovolemia, hypotension, and GRADY vs. Progression of CKD. Can you all see if you can get him in, please? FYI - He does not use portal and will need to be called with appt.  Thanks, Bianka

## 2022-07-11 NOTE — PROGRESS NOTES
"  Subjective:       Patient ID: Jarrett Charlton Jr. is a 72 y.o. White male who presents for follow-up evaluation of   CKD, hyperkalemia    HPI    Last seen by me in May 2021. Previously seen by Dr. Lyon in Aug 2019.      Patient presents for f/u of CKD.  Baseline creatinine had been 1.3-1.5 c frequent increases to 1.6-1.8 range an occasional AKIs c higher sCr. Had GRADY in late December 2019 c peak sCr of 2.3 mg/dL 2/2 volume depletion from high output in ileostomy. Has had hypomagnesemia in the past.  Had K of 5.9 in November; provided with K finder. Since December 2019, new sCr baseline 1.4-1.6 mg/dL. Has been 2.0-2.1 since May 2021.    Home BPs: high in morning; 15 minutes after waking it decreases and is "low all day" until about 5 p.m., it goes up, then he takes his medications.  Recent loop recorder implanted. Has been getting IVF weekly to every other week by cardiologist at [place]. Also recently started Florinef by PCP.    Heart cath on 4/15/21; no significant blockages. Was sent to [facility] and told he needs a pacemaker. Says this is scheduled 5/13. Has been told to be taking pedialyte; still with diarrhea occasionally but much improved overall.    Significant medical problems include Hypertension for 5 years, DM for 3 yrs, gout, ulcerative colitis with ostomy and hyperlipidemia, prostate cancer    The patient denies taking NSAIDs.  Had IV contrast with cardiologist for MRI on 4/3/2020.     Social history: retired, took care of wheelchair-bound wife, but she passed away in July 2020  Significant family hx includes: no known kidney disease in family.    Last renal US: Sept 2019, reviewed. Simple left renal cyst.    Update August 2021:  Colostomy output still more solid "oatmeal" texture with water. Takes immodium if output is more liquid.  Still getting IVF infusions every other week by cardiology.  No recent syncopal episodes.  Home BPs: occasionally SBP dropping to 100; takes BP prior to taking meds " "and holds meds if needed.  Did not take meds yet today because pressure was low today.  Now on Northera for low BP per cardiology.    Denies NSAIDs; no recent hospitalizations.     Update December 2021:   Returns for f/u of CKD.  Baseline sCr 1.7-1.8 mg/dL.  Home BPs: 130-160/60-90; occasionally drops to SBP of high 80s and takes midodrine   Feels dizzy and lightheaded when blood glucose drops below 80  Denies NSAIDs; no recent hospitalizations.   Diarrhea now being treated by Dr. Baum as IBS. Viberzi was just approved but has not been started yet. Still with intermittent diarrhea. Still getting IVF per cardiology every other week.    Update 4/4/22:  Returns for f/u of CKD.  Baseline sCr 1.7-1.8 mg/dL.  Recent sCr 1.3-1.5 during admission for small bowel obstruction March 9-March 11.  Still gets IVF c Dr. Dao (cardiology) q 2 weeks. Dr. Dao is moving to [place], so pt may not be able to drive for this every two weeks. Next IVF is scheduled for 4/15/22.  Home BPs: labile at home. He takes [place] if SBP gets as low as 110 and he takes his antihypertensives when SBP gets to 130-140. He took his antihypertensive medication while in the office today.    Update 4/11/22:  Returns for f/u of CKD.  Baseline sCr 1.7-1.8 mg/dL.  After appt on 4/4, pt's sCr returned at 3.0. Sent to ED. He was given IVF and had ileoscopy. sCr returned to previous baseline prior to discharge on 4/6.    Had IVF infusion on Friday 4/8/22 c cardiology. Had "bad" diarrhea on Saturday morning. Took immodium. He also vomited x 3. He became very weak and could not get off the bathroom floor, where he was sitting to be near toilet. Eventually he regained strength and was able to get up. He did not fall.  He has been drinking plenty of fluid and electrolyte fluids since that time. Stool output has returned to normal. No more vomiting. Says he feels "great" now.    Said he did a tilt table test years ago (before blood pressure issues), and it was " "normal at that time. Says drifts to the side when he walks. Says he gets dizzy if he stands up too fast.    Update 7/11/22:  Returns for f/u of CKD.  Was in ER with hypotension (SBPs 70s) 2/2 diarrhea output. Says he starts to feel tired when SBP drops to 105 mmHg.  Taking metamucil daily to help make stool more firm. Takes immodium when having diarrhea episodes. Says he tried to see GI but was unable to get an appt.  Home BPs: labile 80s-160s/60-90s. Followed closely by Dr. Dao  (outside cariologist).  Recently increased droxidopa to 600 mg BID per Dr. Dao. Also requesting to have weekly IVF instead every other week.      Review of Systems   Cardiovascular:  Denies swelling to legs. Denies chest pain, palpitations.  Gastrointestinal:  Stool output is firm now but had diarrhea the last week  Genitourinary: Negative for difficulty urinating, dysuria, hematuria. Having nocturia.          Objective:       Blood pressure (!) 135/91, pulse 74, height 6' 2" (1.88 m), weight 98.4 kg (216 lb 14.9 oz), SpO2 97 %.  Physical Exam  Vitals signs reviewed.   Constitutional:       General: He is not in acute distress.     Appearance: Normal appearance. He is well-developed. He is not ill-appearing or diaphoretic.   Cardiac: normal rate  Pulmonary:  NAD  Abdomen: No CVA tenderness  Skin:     General: Skin is warm and dry.   Some brown/red discoloration to BLE  Neurological:      Mental Status: He is alert and oriented to person, place, and time.   Psychiatric:         Mood and Affect: Mood normal.         Behavior: Behavior normal.         Thought Content: Thought content normal.         Judgment: Judgment normal.         Lab Results   Component Value Date    CREATININE 2.5 (H) 07/08/2022    URICACID 3.6 07/08/2022     Prot/Creat Ratio, Urine   Date Value Ref Range Status   07/08/2022 0.13 0.00 - 0.20 Final   03/04/2022 0.09 0.00 - 0.20 Final   12/16/2021 0.09 0.00 - 0.20 Final     Lab Results   Component Value Date     " 07/08/2022    K 4.3 07/08/2022    CO2 25 07/08/2022     07/08/2022     Lab Results   Component Value Date    PTH 87.6 (H) 07/08/2022    CALCIUM 8.8 07/08/2022    PHOS 3.9 07/08/2022     Lab Results   Component Value Date    HGB 12.5 (L) 07/08/2022    WBC 5.68 07/08/2022    HCT 39.0 (L) 07/08/2022      Lab Results   Component Value Date    HGBA1C 7.0 (H) 03/04/2022     07/08/2022    BUN 32 (H) 07/08/2022     Lab Results   Component Value Date    LDLCALC 72.6 03/04/2022         Outside labs 10/24/19  Crt: 1.5 mg/dL  Hgb: 15.2 g/dL  CO2: 20 mmol/L  K: 5.9 mmol/L  A1c: 7.1%    Assessment:       1. Type 2 diabetes mellitus with stage 3b chronic kidney disease, unspecified whether long term insulin use    2. Labile blood pressure    3. GRADY (acute kidney injury)    4. Hyperkalemia    5. Hypertension, unspecified type        Plan:     CKD stage 3B c eGFR 43-50 mL/min c superimposed GRADY- 2/2 age-related nephron loss, HTN, previous NSAID use for gout, volume depletion episodes c high ostomy output. Progressive, likely from frequent AKIs 2/2 volume depletion episodes.  No longer using NSAIDs;  At risk for progression given frequent AKIs. On PPI.     Has been getting 1L NS every other week. Will change to weekly.      UPCR No significant proteinuria. Lisinopril previously d/c to help decrease risk of frequent AKIs.     Acid-base WNL    Renal osteodystrophy PTH mildly elevated. Ca WNL. On ergocalciferol.   Anemia At goal for CKD. May be exhibiting hemoconcentration.   DM At goal for CKD.   Lipid Management On statin per PCP     HTN - High today.  Cardiology typically adjusts medications. On amiodarone 200 mg daily, carvedilol 12.5 mg, amlodipine 2.5 mg (only if SBP is over 140) On Northera.  - Allow for permissive HTN due to high propensity for hypotensive episodes causing AKIs  - Recommend that SBP be maintained over 110  - recommend consideration of tilt table test  - Will check saundra/renin, normetanephrines due to  lability of BP, though suspect it is labile 2/2 volume depletion episodes. Also will consider imaging to r/o TALIA, but kidneys both measuring at 10.7 cm, have low suspicion this will be positive.    Hyperkalemia - WNL at last check    Diarrhea - Will message GI provider    All questions patient had were answered.  Asked if further questions. None. F/u in 3 mos with labs and urine prior to next visit or sooner if needed.  ER for emergency concerns.    Note: [employer] participant; took 6 mos survey previously.    Summary of Plan:  1. Naveen/renin, normetanephrines, CBC, BMP  2. avoid NSAID/ bactrim/ IV contrast/ gadolinium/ aminoglycoside where possible  3. 1 L NS IVF weekly  4. See GI  5. Recommend to consider tilt table test  6. RTC in 3 mos    41 minutes of total time spent on the encounter, which includes face to face time and non-face to face time preparing to see the patient (eg, review of tests), Obtaining and/or reviewing separately obtained history, documenting clinical information in the electronic or other health record, independently interpreting results (not separately reported) and communicating results to the patient/family/caregiver, or Care coordination (not separately reported).       Spoke with Dr. Aguirre after the appointment. She recommended holding carvedilol unless SBP is less than 130 and holding amlodipine unless SBP is less than 150.  Will have MA inform patient.

## 2022-07-14 ENCOUNTER — INFUSION (OUTPATIENT)
Dept: INFECTIOUS DISEASES | Facility: HOSPITAL | Age: 72
End: 2022-07-14
Attending: INTERNAL MEDICINE
Payer: MEDICARE

## 2022-07-14 VITALS
SYSTOLIC BLOOD PRESSURE: 156 MMHG | OXYGEN SATURATION: 97 % | HEART RATE: 70 BPM | TEMPERATURE: 98 F | DIASTOLIC BLOOD PRESSURE: 80 MMHG | BODY MASS INDEX: 27.77 KG/M2 | WEIGHT: 216.25 LBS | RESPIRATION RATE: 18 BRPM

## 2022-07-14 DIAGNOSIS — N18.32 TYPE 2 DIABETES MELLITUS WITH STAGE 3B CHRONIC KIDNEY DISEASE, WITHOUT LONG-TERM CURRENT USE OF INSULIN: ICD-10-CM

## 2022-07-14 DIAGNOSIS — Z93.3 COLOSTOMY PRESENT: ICD-10-CM

## 2022-07-14 DIAGNOSIS — E86.0 DEHYDRATION: Primary | ICD-10-CM

## 2022-07-14 DIAGNOSIS — E11.22 TYPE 2 DIABETES MELLITUS WITH STAGE 3B CHRONIC KIDNEY DISEASE, WITHOUT LONG-TERM CURRENT USE OF INSULIN: ICD-10-CM

## 2022-07-14 PROCEDURE — 96360 HYDRATION IV INFUSION INIT: CPT

## 2022-07-14 PROCEDURE — 25000003 PHARM REV CODE 250: Performed by: NURSE PRACTITIONER

## 2022-07-14 RX ORDER — HEPARIN 100 UNIT/ML
500 SYRINGE INTRAVENOUS
Status: CANCELLED | OUTPATIENT
Start: 2022-07-15

## 2022-07-14 RX ORDER — SODIUM CHLORIDE 0.9 % (FLUSH) 0.9 %
10 SYRINGE (ML) INJECTION
Status: CANCELLED | OUTPATIENT
Start: 2022-07-15

## 2022-07-14 RX ADMIN — SODIUM CHLORIDE 1000 ML: 0.9 INJECTION, SOLUTION INTRAVENOUS at 08:07

## 2022-07-15 ENCOUNTER — IMMUNIZATION (OUTPATIENT)
Dept: PRIMARY CARE CLINIC | Facility: CLINIC | Age: 72
End: 2022-07-15
Payer: MEDICARE

## 2022-07-15 DIAGNOSIS — Z23 NEED FOR VACCINATION: Primary | ICD-10-CM

## 2022-07-15 PROCEDURE — 0054A COVID-19, MRNA, LNP-S, PF, 30 MCG/0.3 ML DOSE VACCINE (PFIZER): CPT | Mod: PBBFAC | Performed by: EMERGENCY MEDICINE

## 2022-07-21 ENCOUNTER — PATIENT MESSAGE (OUTPATIENT)
Dept: NEPHROLOGY | Facility: CLINIC | Age: 72
End: 2022-07-21
Payer: MEDICARE

## 2022-07-22 ENCOUNTER — INFUSION (OUTPATIENT)
Dept: INFECTIOUS DISEASES | Facility: HOSPITAL | Age: 72
End: 2022-07-22
Attending: INTERNAL MEDICINE
Payer: MEDICARE

## 2022-07-22 VITALS
HEART RATE: 72 BPM | BODY MASS INDEX: 27.65 KG/M2 | WEIGHT: 215.38 LBS | OXYGEN SATURATION: 96 % | TEMPERATURE: 97 F | RESPIRATION RATE: 18 BRPM | DIASTOLIC BLOOD PRESSURE: 74 MMHG | SYSTOLIC BLOOD PRESSURE: 131 MMHG

## 2022-07-22 DIAGNOSIS — Z93.3 COLOSTOMY PRESENT: ICD-10-CM

## 2022-07-22 DIAGNOSIS — E11.22 TYPE 2 DIABETES MELLITUS WITH STAGE 3B CHRONIC KIDNEY DISEASE, WITHOUT LONG-TERM CURRENT USE OF INSULIN: ICD-10-CM

## 2022-07-22 DIAGNOSIS — E86.0 DEHYDRATION: Primary | ICD-10-CM

## 2022-07-22 DIAGNOSIS — N18.32 TYPE 2 DIABETES MELLITUS WITH STAGE 3B CHRONIC KIDNEY DISEASE, WITHOUT LONG-TERM CURRENT USE OF INSULIN: ICD-10-CM

## 2022-07-22 PROCEDURE — 96360 HYDRATION IV INFUSION INIT: CPT

## 2022-07-22 PROCEDURE — 25000003 PHARM REV CODE 250: Performed by: NURSE PRACTITIONER

## 2022-07-22 RX ORDER — SODIUM CHLORIDE 0.9 % (FLUSH) 0.9 %
10 SYRINGE (ML) INJECTION
Status: CANCELLED | OUTPATIENT
Start: 2022-07-29

## 2022-07-22 RX ORDER — HEPARIN 100 UNIT/ML
500 SYRINGE INTRAVENOUS
Status: CANCELLED | OUTPATIENT
Start: 2022-07-29

## 2022-07-22 RX ADMIN — SODIUM CHLORIDE 1000 ML: 0.9 INJECTION, SOLUTION INTRAVENOUS at 11:07

## 2022-07-27 ENCOUNTER — OFFICE VISIT (OUTPATIENT)
Dept: PRIMARY CARE CLINIC | Facility: CLINIC | Age: 72
End: 2022-07-27
Payer: MEDICARE

## 2022-07-27 VITALS
BODY MASS INDEX: 27.45 KG/M2 | HEIGHT: 74 IN | SYSTOLIC BLOOD PRESSURE: 130 MMHG | HEART RATE: 75 BPM | RESPIRATION RATE: 16 BRPM | WEIGHT: 213.88 LBS | OXYGEN SATURATION: 98 % | DIASTOLIC BLOOD PRESSURE: 84 MMHG

## 2022-07-27 DIAGNOSIS — N18.32 CHRONIC RENAL IMPAIRMENT, STAGE 3B: ICD-10-CM

## 2022-07-27 DIAGNOSIS — E86.0 DEHYDRATION: ICD-10-CM

## 2022-07-27 DIAGNOSIS — Z93.2 HIGH OUTPUT ILEOSTOMY: ICD-10-CM

## 2022-07-27 DIAGNOSIS — E11.22 TYPE 2 DIABETES MELLITUS WITH STAGE 3B CHRONIC KIDNEY DISEASE, WITHOUT LONG-TERM CURRENT USE OF INSULIN: Primary | ICD-10-CM

## 2022-07-27 DIAGNOSIS — E11.40 DIABETIC NEUROPATHY, PAINFUL: ICD-10-CM

## 2022-07-27 DIAGNOSIS — N18.32 TYPE 2 DIABETES MELLITUS WITH STAGE 3B CHRONIC KIDNEY DISEASE, WITHOUT LONG-TERM CURRENT USE OF INSULIN: Primary | ICD-10-CM

## 2022-07-27 DIAGNOSIS — R19.8 HIGH OUTPUT ILEOSTOMY: ICD-10-CM

## 2022-07-27 PROCEDURE — 3044F HG A1C LEVEL LT 7.0%: CPT | Mod: CPTII,S$GLB,, | Performed by: INTERNAL MEDICINE

## 2022-07-27 PROCEDURE — 1126F AMNT PAIN NOTED NONE PRSNT: CPT | Mod: CPTII,S$GLB,, | Performed by: INTERNAL MEDICINE

## 2022-07-27 PROCEDURE — 3066F PR DOCUMENTATION OF TREATMENT FOR NEPHROPATHY: ICD-10-PCS | Mod: CPTII,S$GLB,, | Performed by: INTERNAL MEDICINE

## 2022-07-27 PROCEDURE — 99999 PR PBB SHADOW E&M-EST. PATIENT-LVL V: ICD-10-PCS | Mod: PBBFAC,,, | Performed by: INTERNAL MEDICINE

## 2022-07-27 PROCEDURE — 3044F PR MOST RECENT HEMOGLOBIN A1C LEVEL <7.0%: ICD-10-PCS | Mod: CPTII,S$GLB,, | Performed by: INTERNAL MEDICINE

## 2022-07-27 PROCEDURE — 1160F RVW MEDS BY RX/DR IN RCRD: CPT | Mod: CPTII,S$GLB,, | Performed by: INTERNAL MEDICINE

## 2022-07-27 PROCEDURE — 3075F PR MOST RECENT SYSTOLIC BLOOD PRESS GE 130-139MM HG: ICD-10-PCS | Mod: CPTII,S$GLB,, | Performed by: INTERNAL MEDICINE

## 2022-07-27 PROCEDURE — 1159F PR MEDICATION LIST DOCUMENTED IN MEDICAL RECORD: ICD-10-PCS | Mod: CPTII,S$GLB,, | Performed by: INTERNAL MEDICINE

## 2022-07-27 PROCEDURE — 99213 OFFICE O/P EST LOW 20 MIN: CPT | Mod: S$GLB,,, | Performed by: INTERNAL MEDICINE

## 2022-07-27 PROCEDURE — 3008F PR BODY MASS INDEX (BMI) DOCUMENTED: ICD-10-PCS | Mod: CPTII,S$GLB,, | Performed by: INTERNAL MEDICINE

## 2022-07-27 PROCEDURE — 3008F BODY MASS INDEX DOCD: CPT | Mod: CPTII,S$GLB,, | Performed by: INTERNAL MEDICINE

## 2022-07-27 PROCEDURE — 3079F DIAST BP 80-89 MM HG: CPT | Mod: CPTII,S$GLB,, | Performed by: INTERNAL MEDICINE

## 2022-07-27 PROCEDURE — 3066F NEPHROPATHY DOC TX: CPT | Mod: CPTII,S$GLB,, | Performed by: INTERNAL MEDICINE

## 2022-07-27 PROCEDURE — 3288F PR FALLS RISK ASSESSMENT DOCUMENTED: ICD-10-PCS | Mod: CPTII,S$GLB,, | Performed by: INTERNAL MEDICINE

## 2022-07-27 PROCEDURE — 3079F PR MOST RECENT DIASTOLIC BLOOD PRESSURE 80-89 MM HG: ICD-10-PCS | Mod: CPTII,S$GLB,, | Performed by: INTERNAL MEDICINE

## 2022-07-27 PROCEDURE — 1159F MED LIST DOCD IN RCRD: CPT | Mod: CPTII,S$GLB,, | Performed by: INTERNAL MEDICINE

## 2022-07-27 PROCEDURE — 1101F PT FALLS ASSESS-DOCD LE1/YR: CPT | Mod: CPTII,S$GLB,, | Performed by: INTERNAL MEDICINE

## 2022-07-27 PROCEDURE — 99999 PR PBB SHADOW E&M-EST. PATIENT-LVL V: CPT | Mod: PBBFAC,,, | Performed by: INTERNAL MEDICINE

## 2022-07-27 PROCEDURE — 99213 PR OFFICE/OUTPT VISIT, EST, LEVL III, 20-29 MIN: ICD-10-PCS | Mod: S$GLB,,, | Performed by: INTERNAL MEDICINE

## 2022-07-27 PROCEDURE — 1101F PR PT FALLS ASSESS DOC 0-1 FALLS W/OUT INJ PAST YR: ICD-10-PCS | Mod: CPTII,S$GLB,, | Performed by: INTERNAL MEDICINE

## 2022-07-27 PROCEDURE — 3288F FALL RISK ASSESSMENT DOCD: CPT | Mod: CPTII,S$GLB,, | Performed by: INTERNAL MEDICINE

## 2022-07-27 PROCEDURE — 3075F SYST BP GE 130 - 139MM HG: CPT | Mod: CPTII,S$GLB,, | Performed by: INTERNAL MEDICINE

## 2022-07-27 PROCEDURE — 1126F PR PAIN SEVERITY QUANTIFIED, NO PAIN PRESENT: ICD-10-PCS | Mod: CPTII,S$GLB,, | Performed by: INTERNAL MEDICINE

## 2022-07-27 PROCEDURE — 1160F PR REVIEW ALL MEDS BY PRESCRIBER/CLIN PHARMACIST DOCUMENTED: ICD-10-PCS | Mod: CPTII,S$GLB,, | Performed by: INTERNAL MEDICINE

## 2022-07-27 NOTE — PROGRESS NOTES
Subjective:       Patient ID: Jarrett Charlton Jr. is a 72 y.o. male.    Chief Complaint: Follow-up (3 mth)    HPI  Pt visit today for routine f/u he ha sDM with peripheral neuropathy in both feet painful tender request refill on diabetic shoes which ahdbeen helping with diabetic neuropathy . His ha sbeen doing ok with intermittent high output colostomy with presyncope he is doing well currently on medication including florinef no sob cp WILSON  Review of Systems    Objective:      Physical Exam  Vitals and nursing note reviewed.   Constitutional:       General: He is not in acute distress.     Appearance: He is well-developed.   HENT:      Head: Normocephalic and atraumatic.      Right Ear: External ear normal.      Left Ear: External ear normal.      Nose: Nose normal.      Mouth/Throat:      Pharynx: No oropharyngeal exudate.   Eyes:      Extraocular Movements: Extraocular movements intact.      Conjunctiva/sclera: Conjunctivae normal.      Pupils: Pupils are equal, round, and reactive to light.   Neck:      Thyroid: No thyromegaly.   Cardiovascular:      Rate and Rhythm: Normal rate and regular rhythm.      Heart sounds: Normal heart sounds. No murmur heard.    No friction rub. No gallop.   Pulmonary:      Effort: Pulmonary effort is normal. No respiratory distress.      Breath sounds: Normal breath sounds. No wheezing.   Abdominal:      General: Bowel sounds are normal. There is no distension.      Palpations: Abdomen is soft.      Tenderness: There is no abdominal tenderness.   Musculoskeletal:         General: No tenderness or deformity. Normal range of motion.      Cervical back: Normal range of motion and neck supple.   Lymphadenopathy:      Cervical: No cervical adenopathy.   Skin:     General: Skin is warm and dry.      Findings: No erythema or rash.   Neurological:      Mental Status: He is alert and oriented to person, place, and time.   Psychiatric:         Mood and Affect: Mood normal.         Thought  Content: Thought content normal.         Judgment: Judgment normal.         Assessment:       1. Type 2 diabetes mellitus with stage 3b chronic kidney disease, without long-term current use of insulin    2. Dehydration    3. Diabetic neuropathy, painful    4. High output ileostomy    5. Chronic renal impairment, stage 3b        Plan:       Type 2 diabetes mellitus with stage 3b chronic kidney disease, without long-term current use of insulin  -     Hemoglobin A1C; Future; Expected date: 07/27/2022  -     Basic Metabolic Panel; Future; Expected date: 07/27/2022  -     Diabetic Eye Screening Photo; Future    Dehydration  -     DIABETIC SHOES FOR HOME USE    Diabetic neuropathy, painful  -     DIABETIC SHOES FOR HOME USE    High output ileostomy  Comments:  continue with tx IVF weekly    Chronic renal impairment, stage 3b  Comments:  continue with increase po fluid IVF f/u with nephrology avoid NSAIDS         Medication List with Changes/Refills   Current Medications    ACCU-CHEK PAUL CONTROL SOLN SOLN    1 drop by NOT APPLICABLE route once daily.    ACCU-CHEK SOFTCLIX LANCETS MISC    1 lancet by NOT APPLICABLE route once daily.    ALLOPURINOL (ZYLOPRIM) 300 MG TABLET    Take 1.5 tablets (450 mg total) by mouth once daily.    ALPRAZOLAM (XANAX) 0.5 MG TABLET    Take 0.5 mg by mouth nightly as needed for Anxiety.    AMIODARONE (PACERONE) 200 MG TAB    Take 1 tablet (200 mg total) by mouth 2 (two) times daily.    AMLODIPINE (NORVASC) 5 MG TABLET    Take 5 mg by mouth once daily.    ASPIRIN (ECOTRIN) 81 MG EC TABLET    Take 81 mg by mouth once daily.    BD ALCOHOL SWABS PAD        BLOOD SUGAR DIAGNOSTIC (ONE TOUCH ULTRA TEST) [facility]    USE ONE STRIP TO CHECK GLUCOSE EVERY DAY    BRIMONIDINE 0.2% (ALPHAGAN) 0.2 % DROP    INSTILL 1 DROP INTO EACH EYE TWICE DAILY    CALCIUM CITRATE-VITAMIN D3 (CITRACAL + D MAXIMUM) 315 MG-6.25 MCG (250 UNIT) TAB    Take 1 tablet by mouth once daily.    CARVEDILOL (COREG) 12.5 MG TABLET     Take 12.5 mg by mouth 2 (two) times daily with meals.    CLOPIDOGREL (PLAVIX) 75 MG TABLET    Take 75 mg by mouth once daily.    DROXIDOPA 100 MG CAP    Take 2 capsules by mouth 2 (two) times a day. For Low blood pressure    FIBER, CALCIUM POLYCARBOPHIL, 625 MG TABLET    Take 3 tablets by mouth before dinner.    FLUDROCORTISONE (FLORINEF) 0.1 MG TAB        GABAPENTIN (NEURONTIN) 600 MG TABLET    Take 1 tablet (600 mg total) by mouth 2 (two) times daily.    GLIMEPIRIDE (AMARYL) 4 MG TABLET    Take 4 mg by mouth 2 (two) times daily before meals.    HYDROCODONE-ACETAMINOPHEN (NORCO) 5-325 MG PER TABLET    Take 1 tablet by mouth every 12 (twelve) hours as needed for Pain.    MAGNESIUM OXIDE (MAG-OX) 400 MG (241.3 MG MAGNESIUM) TABLET    Take 400 mg by mouth once daily.    MECLIZINE (ANTIVERT) 12.5 MG TABLET    Take 1 tablet (12.5 mg total) by mouth 3 (three) times daily as needed for Dizziness.    METHYLPREDNISOLONE (MEDROL DOSEPACK) 4 MG TABLET    use as directed    PANTOPRAZOLE (PROTONIX) 40 MG TABLET    Take 40 mg by mouth once daily.    PRAVASTATIN (PRAVACHOL) 40 MG TABLET    Take 1 tablet (40 mg total) by mouth once daily.

## 2022-07-28 ENCOUNTER — INFUSION (OUTPATIENT)
Dept: INFECTIOUS DISEASES | Facility: HOSPITAL | Age: 72
End: 2022-07-28
Attending: INTERNAL MEDICINE
Payer: MEDICARE

## 2022-07-28 VITALS
WEIGHT: 214.94 LBS | HEART RATE: 74 BPM | BODY MASS INDEX: 27.6 KG/M2 | SYSTOLIC BLOOD PRESSURE: 140 MMHG | OXYGEN SATURATION: 96 % | TEMPERATURE: 97 F | DIASTOLIC BLOOD PRESSURE: 60 MMHG

## 2022-07-28 DIAGNOSIS — N18.32 TYPE 2 DIABETES MELLITUS WITH STAGE 3B CHRONIC KIDNEY DISEASE, WITHOUT LONG-TERM CURRENT USE OF INSULIN: ICD-10-CM

## 2022-07-28 DIAGNOSIS — Z93.3 COLOSTOMY PRESENT: ICD-10-CM

## 2022-07-28 DIAGNOSIS — E86.0 DEHYDRATION: Primary | ICD-10-CM

## 2022-07-28 DIAGNOSIS — E11.22 TYPE 2 DIABETES MELLITUS WITH STAGE 3B CHRONIC KIDNEY DISEASE, WITHOUT LONG-TERM CURRENT USE OF INSULIN: ICD-10-CM

## 2022-07-28 PROCEDURE — 25000003 PHARM REV CODE 250: Performed by: NURSE PRACTITIONER

## 2022-07-28 PROCEDURE — 96361 HYDRATE IV INFUSION ADD-ON: CPT

## 2022-07-28 RX ORDER — HEPARIN 100 UNIT/ML
500 SYRINGE INTRAVENOUS
Status: CANCELLED | OUTPATIENT
Start: 2022-07-29

## 2022-07-28 RX ORDER — SODIUM CHLORIDE 0.9 % (FLUSH) 0.9 %
10 SYRINGE (ML) INJECTION
Status: CANCELLED | OUTPATIENT
Start: 2022-07-29

## 2022-07-28 RX ADMIN — SODIUM CHLORIDE 1000 ML: 0.9 INJECTION, SOLUTION INTRAVENOUS at 09:07

## 2022-07-28 NOTE — PROGRESS NOTES
Pt received 1 liter NS [id number], pt tolerated well & left in NAD, next appt made

## 2022-07-28 NOTE — PROGRESS NOTES
Patient, Jarrett Charlton Jr. (MRN #[id number]), presented with a recent Estimated Glumerular Filtration Rate (EGFR) between 15 and 29 consistent with the definition of chronic kidney disease stage 4 (ICD10 - N18.4).    eGFR if non    Date Value Ref Range Status   07/27/2022 16.3 (A) >60 mL/min/1.73 m^2 Final     Comment:     Calculation used to obtain the estimated glomerular filtration  rate (eGFR) is the CKD-EPI equation.          The patient's chronic kidney disease stage 4 was monitored, evaluated, addressed and/or treated. This addendum to the medical record is made on 07/28/2022.

## 2022-07-29 ENCOUNTER — TELEPHONE (OUTPATIENT)
Dept: ENDOSCOPY | Facility: HOSPITAL | Age: 72
End: 2022-07-29
Payer: MEDICARE

## 2022-07-29 ENCOUNTER — TELEPHONE (OUTPATIENT)
Dept: NEPHROLOGY | Facility: CLINIC | Age: 72
End: 2022-07-29
Payer: MEDICARE

## 2022-07-29 NOTE — TELEPHONE ENCOUNTER
RE: call back for an appt asap  Received: Today  MD Kayley Ramos MA  Caller: Unspecified (Today,  4:11 PM)  Fine, let him hydrate meanwhile

## 2022-07-29 NOTE — TELEPHONE ENCOUNTER
----- Message from Tremontana Chevalier sent at 7/29/2022  4:38 PM CDT -----  Regarding: appt  Contact: pt @ [phone]  Pt clling to reschedule 3 pm appt with Dr. Lowe on 08/01. No appts avail to schedule in [facility]. Pls call pt @ [phone].

## 2022-08-01 ENCOUNTER — PATIENT MESSAGE (OUTPATIENT)
Dept: NEPHROLOGY | Facility: CLINIC | Age: 72
End: 2022-08-01
Payer: MEDICARE

## 2022-08-01 ENCOUNTER — OFFICE VISIT (OUTPATIENT)
Dept: NEPHROLOGY | Facility: CLINIC | Age: 72
End: 2022-08-01
Payer: MEDICARE

## 2022-08-01 ENCOUNTER — LAB VISIT (OUTPATIENT)
Dept: LAB | Facility: HOSPITAL | Age: 72
End: 2022-08-01
Payer: MEDICARE

## 2022-08-01 VITALS
OXYGEN SATURATION: 97 % | BODY MASS INDEX: 27.9 KG/M2 | HEART RATE: 84 BPM | DIASTOLIC BLOOD PRESSURE: 101 MMHG | SYSTOLIC BLOOD PRESSURE: 146 MMHG | WEIGHT: 217.38 LBS | HEIGHT: 74 IN

## 2022-08-01 DIAGNOSIS — R09.89 LABILE BLOOD PRESSURE: ICD-10-CM

## 2022-08-01 DIAGNOSIS — R19.7 DIARRHEA, UNSPECIFIED TYPE: ICD-10-CM

## 2022-08-01 DIAGNOSIS — N18.32 STAGE 3B CHRONIC KIDNEY DISEASE: ICD-10-CM

## 2022-08-01 DIAGNOSIS — N18.32 TYPE 2 DIABETES MELLITUS WITH STAGE 3B CHRONIC KIDNEY DISEASE, UNSPECIFIED WHETHER LONG TERM INSULIN USE: ICD-10-CM

## 2022-08-01 DIAGNOSIS — N17.9 AKI (ACUTE KIDNEY INJURY): Primary | ICD-10-CM

## 2022-08-01 DIAGNOSIS — E87.5 HYPERKALEMIA: ICD-10-CM

## 2022-08-01 DIAGNOSIS — E11.22 TYPE 2 DIABETES MELLITUS WITH STAGE 3B CHRONIC KIDNEY DISEASE, UNSPECIFIED WHETHER LONG TERM INSULIN USE: ICD-10-CM

## 2022-08-01 DIAGNOSIS — I10 HYPERTENSION, UNSPECIFIED TYPE: ICD-10-CM

## 2022-08-01 DIAGNOSIS — N17.9 AKI (ACUTE KIDNEY INJURY): ICD-10-CM

## 2022-08-01 LAB
ALBUMIN SERPL BCP-MCNC: 3.2 G/DL (ref 3.5–5.2)
ANION GAP SERPL CALC-SCNC: 11 MMOL/L (ref 8–16)
BASOPHILS # BLD AUTO: 0.03 K/UL (ref 0–0.2)
BASOPHILS NFR BLD: 0.4 % (ref 0–1.9)
BUN SERPL-MCNC: 38 MG/DL (ref 8–23)
CALCIUM SERPL-MCNC: 9.2 MG/DL (ref 8.7–10.5)
CHLORIDE SERPL-SCNC: 105 MMOL/L (ref 95–110)
CO2 SERPL-SCNC: 27 MMOL/L (ref 23–29)
CREAT SERPL-MCNC: 2.3 MG/DL (ref 0.5–1.4)
DIFFERENTIAL METHOD: ABNORMAL
EOSINOPHIL # BLD AUTO: 0.2 K/UL (ref 0–0.5)
EOSINOPHIL NFR BLD: 2.8 % (ref 0–8)
ERYTHROCYTE [DISTWIDTH] IN BLOOD BY AUTOMATED COUNT: 14.7 % (ref 11.5–14.5)
EST. GFR  (NO RACE VARIABLE): 29.3 ML/MIN/1.73 M^2
GLUCOSE SERPL-MCNC: 117 MG/DL (ref 70–110)
HCT VFR BLD AUTO: 38 % (ref 40–54)
HGB BLD-MCNC: 12.5 G/DL (ref 14–18)
IMM GRANULOCYTES # BLD AUTO: 0.02 K/UL (ref 0–0.04)
IMM GRANULOCYTES NFR BLD AUTO: 0.3 % (ref 0–0.5)
LYMPHOCYTES # BLD AUTO: 1.7 K/UL (ref 1–4.8)
LYMPHOCYTES NFR BLD: 24.1 % (ref 18–48)
MAGNESIUM SERPL-MCNC: 1.5 MG/DL (ref 1.6–2.6)
MCH RBC QN AUTO: 29.8 PG (ref 27–31)
MCHC RBC AUTO-ENTMCNC: 32.9 G/DL (ref 32–36)
MCV RBC AUTO: 91 FL (ref 82–98)
MONOCYTES # BLD AUTO: 0.5 K/UL (ref 0.3–1)
MONOCYTES NFR BLD: 7.2 % (ref 4–15)
NEUTROPHILS # BLD AUTO: 4.6 K/UL (ref 1.8–7.7)
NEUTROPHILS NFR BLD: 65.2 % (ref 38–73)
NRBC BLD-RTO: 0 /100 WBC
PHOSPHATE SERPL-MCNC: 3.3 MG/DL (ref 2.7–4.5)
PLATELET # BLD AUTO: 179 K/UL (ref 150–450)
PMV BLD AUTO: 11.1 FL (ref 9.2–12.9)
POTASSIUM SERPL-SCNC: 3.7 MMOL/L (ref 3.5–5.1)
RBC # BLD AUTO: 4.19 M/UL (ref 4.6–6.2)
SODIUM SERPL-SCNC: 143 MMOL/L (ref 136–145)
WBC # BLD AUTO: 7.06 K/UL (ref 3.9–12.7)

## 2022-08-01 PROCEDURE — 3066F NEPHROPATHY DOC TX: CPT | Mod: CPTII,S$GLB,, | Performed by: NURSE PRACTITIONER

## 2022-08-01 PROCEDURE — 3066F PR DOCUMENTATION OF TREATMENT FOR NEPHROPATHY: ICD-10-PCS | Mod: CPTII,S$GLB,, | Performed by: NURSE PRACTITIONER

## 2022-08-01 PROCEDURE — 3288F FALL RISK ASSESSMENT DOCD: CPT | Mod: CPTII,S$GLB,, | Performed by: NURSE PRACTITIONER

## 2022-08-01 PROCEDURE — 3077F SYST BP >= 140 MM HG: CPT | Mod: CPTII,S$GLB,, | Performed by: NURSE PRACTITIONER

## 2022-08-01 PROCEDURE — 1101F PR PT FALLS ASSESS DOC 0-1 FALLS W/OUT INJ PAST YR: ICD-10-PCS | Mod: CPTII,S$GLB,, | Performed by: NURSE PRACTITIONER

## 2022-08-01 PROCEDURE — 3044F PR MOST RECENT HEMOGLOBIN A1C LEVEL <7.0%: ICD-10-PCS | Mod: CPTII,S$GLB,, | Performed by: NURSE PRACTITIONER

## 2022-08-01 PROCEDURE — 99215 PR OFFICE/OUTPT VISIT, EST, LEVL V, 40-54 MIN: ICD-10-PCS | Mod: S$GLB,,, | Performed by: NURSE PRACTITIONER

## 2022-08-01 PROCEDURE — 1159F MED LIST DOCD IN RCRD: CPT | Mod: CPTII,S$GLB,, | Performed by: NURSE PRACTITIONER

## 2022-08-01 PROCEDURE — 3008F BODY MASS INDEX DOCD: CPT | Mod: CPTII,S$GLB,, | Performed by: NURSE PRACTITIONER

## 2022-08-01 PROCEDURE — 99999 PR PBB SHADOW E&M-EST. PATIENT-LVL V: CPT | Mod: PBBFAC,,, | Performed by: NURSE PRACTITIONER

## 2022-08-01 PROCEDURE — 1160F RVW MEDS BY RX/DR IN RCRD: CPT | Mod: CPTII,S$GLB,, | Performed by: NURSE PRACTITIONER

## 2022-08-01 PROCEDURE — 1126F AMNT PAIN NOTED NONE PRSNT: CPT | Mod: CPTII,S$GLB,, | Performed by: NURSE PRACTITIONER

## 2022-08-01 PROCEDURE — 1160F PR REVIEW ALL MEDS BY PRESCRIBER/CLIN PHARMACIST DOCUMENTED: ICD-10-PCS | Mod: CPTII,S$GLB,, | Performed by: NURSE PRACTITIONER

## 2022-08-01 PROCEDURE — 3008F PR BODY MASS INDEX (BMI) DOCUMENTED: ICD-10-PCS | Mod: CPTII,S$GLB,, | Performed by: NURSE PRACTITIONER

## 2022-08-01 PROCEDURE — 1126F PR PAIN SEVERITY QUANTIFIED, NO PAIN PRESENT: ICD-10-PCS | Mod: CPTII,S$GLB,, | Performed by: NURSE PRACTITIONER

## 2022-08-01 PROCEDURE — 83735 ASSAY OF MAGNESIUM: CPT | Performed by: NURSE PRACTITIONER

## 2022-08-01 PROCEDURE — 3044F HG A1C LEVEL LT 7.0%: CPT | Mod: CPTII,S$GLB,, | Performed by: NURSE PRACTITIONER

## 2022-08-01 PROCEDURE — 3288F PR FALLS RISK ASSESSMENT DOCUMENTED: ICD-10-PCS | Mod: CPTII,S$GLB,, | Performed by: NURSE PRACTITIONER

## 2022-08-01 PROCEDURE — 1159F PR MEDICATION LIST DOCUMENTED IN MEDICAL RECORD: ICD-10-PCS | Mod: CPTII,S$GLB,, | Performed by: NURSE PRACTITIONER

## 2022-08-01 PROCEDURE — 3077F PR MOST RECENT SYSTOLIC BLOOD PRESSURE >= 140 MM HG: ICD-10-PCS | Mod: CPTII,S$GLB,, | Performed by: NURSE PRACTITIONER

## 2022-08-01 PROCEDURE — 99999 PR PBB SHADOW E&M-EST. PATIENT-LVL V: ICD-10-PCS | Mod: PBBFAC,,, | Performed by: NURSE PRACTITIONER

## 2022-08-01 PROCEDURE — 80069 RENAL FUNCTION PANEL: CPT | Performed by: NURSE PRACTITIONER

## 2022-08-01 PROCEDURE — 3080F DIAST BP >= 90 MM HG: CPT | Mod: CPTII,S$GLB,, | Performed by: NURSE PRACTITIONER

## 2022-08-01 PROCEDURE — 85025 COMPLETE CBC W/AUTO DIFF WBC: CPT | Performed by: NURSE PRACTITIONER

## 2022-08-01 PROCEDURE — 1101F PT FALLS ASSESS-DOCD LE1/YR: CPT | Mod: CPTII,S$GLB,, | Performed by: NURSE PRACTITIONER

## 2022-08-01 PROCEDURE — 3080F PR MOST RECENT DIASTOLIC BLOOD PRESSURE >= 90 MM HG: ICD-10-PCS | Mod: CPTII,S$GLB,, | Performed by: NURSE PRACTITIONER

## 2022-08-01 PROCEDURE — 99215 OFFICE O/P EST HI 40 MIN: CPT | Mod: S$GLB,,, | Performed by: NURSE PRACTITIONER

## 2022-08-01 PROCEDURE — 36415 COLL VENOUS BLD VENIPUNCTURE: CPT | Performed by: NURSE PRACTITIONER

## 2022-08-01 NOTE — PROGRESS NOTES
"  Subjective:       Patient ID: Jarrett Charlton Jr. is a 72 y.o. White male who presents for follow-up evaluation of   CKD, hyperkalemia    HPI    Last seen by me in May 2021. Previously seen by Dr. Lyon in Aug 2019.      Patient presents for f/u of CKD.  Baseline creatinine had been 1.3-1.5 c frequent increases to 1.6-1.8 range an occasional AKIs c higher sCr. Had GRADY in late December 2019 c peak sCr of 2.3 mg/dL 2/2 volume depletion from high output in ileostomy. Has had hypomagnesemia in the past.  Had K of 5.9 in November; provided with K finder. Since December 2019, new sCr baseline 1.4-1.6 mg/dL. Has been 2.0-2.1 since May 2021.    Home BPs: high in morning; 15 minutes after waking it decreases and is "low all day" until about 5 p.m., it goes up, then he takes his medications.  Recent loop recorder implanted. Has been getting IVF weekly to every other week by cardiologist at [place]. Also recently started Florinef by PCP.    Heart cath on 4/15/21; no significant blockages. Was sent to [facility] and told he needs a pacemaker. Says this is scheduled 5/13. Has been told to be taking pedialyte; still with diarrhea occasionally but much improved overall.    Significant medical problems include Hypertension for 5 years, DM for 3 yrs, gout, ulcerative colitis with ostomy and hyperlipidemia, prostate cancer    The patient denies taking NSAIDs.  Had IV contrast with cardiologist for MRI on 4/3/2020.     Social history: retired, took care of wheelchair-bound wife, but she passed away in July 2020  Significant family hx includes: no known kidney disease in family.    Last renal US: Sept 2019, reviewed. Simple left renal cyst.    Update August 2021:  Colostomy output still more solid "oatmeal" texture with water. Takes immodium if output is more liquid.  Still getting IVF infusions every other week by cardiology.  No recent syncopal episodes.  Home BPs: occasionally SBP dropping to 100; takes BP prior to taking meds " "and holds meds if needed.  Did not take meds yet today because pressure was low today.  Now on Northera for low BP per cardiology.    Denies NSAIDs; no recent hospitalizations.     Update December 2021:   Returns for f/u of CKD.  Baseline sCr 1.7-1.8 mg/dL.  Home BPs: 130-160/60-90; occasionally drops to SBP of high 80s and takes midodrine   Feels dizzy and lightheaded when blood glucose drops below 80  Denies NSAIDs; no recent hospitalizations.   Diarrhea now being treated by Dr. Baum as IBS. Viberzi was just approved but has not been started yet. Still with intermittent diarrhea. Still getting IVF per cardiology every other week.    Update 4/4/22:  Returns for f/u of CKD.  Baseline sCr 1.7-1.8 mg/dL.  Recent sCr 1.3-1.5 during admission for small bowel obstruction March 9-March 11.  Still gets IVF c Dr. Dao (cardiology) q 2 weeks. Dr. Dao is moving to [place], so pt may not be able to drive for this every two weeks. Next IVF is scheduled for 4/15/22.  Home BPs: labile at home. He takes Murray if SBP gets as low as 110 and he takes his antihypertensives when SBP gets to 130-140. He took his antihypertensive medication while in the office today.    Update 4/11/22:  Returns for f/u of CKD.  Baseline sCr 1.7-1.8 mg/dL.  After appt on 4/4, pt's sCr returned at 3.0. Sent to ED. He was given IVF and had ileoscopy. sCr returned to previous baseline prior to discharge on 4/6.    Had IVF infusion on Friday 4/8/22 c cardiology. Had "bad" diarrhea on Saturday morning. Took immodium. He also vomited x 3. He became very weak and could not get off the bathroom floor, where he was sitting to be near toilet. Eventually he regained strength and was able to get up. He did not fall.  He has been drinking plenty of fluid and electrolyte fluids since that time. Stool output has returned to normal. No more vomiting. Says he feels "great" now.    Said he did a tilt table test years ago (before blood pressure issues), and it was " "normal at that time. Says drifts to the side when he walks. Says he gets dizzy if he stands up too fast.    Update 7/11/22:  Returns for f/u of CKD.  Was in ER with hypotension (SBPs 70s) 2/2 diarrhea output. Says he starts to feel tired when SBP drops to 105 mmHg.  Taking metamucil daily to help make stool more firm. Takes immodium when having diarrhea episodes. Says he tried to see GI but was unable to get an appt.  Home BPs: labile 80s-160s/60-90s. Followed closely by Dr. Dao  (outside cariologist).  Recently increased droxidopa to 600 mg BID per Dr. Dao. Also requesting to have weekly IVF instead every other week.    Update 8/1/22:  Returns for f/u GRADY.  sCr 3.5 mg/dL recently.  SBP in 80s frequently. Now on Northera 600 mg 4x/day per cardiologist. Carvedilol reduced to 6.25 .  Says he has not had liquid stools.  Says his cardiologist wants him to have tilt table test.    Review of Systems   Cardiovascular:  Denies swelling to legs. Denies chest pain, palpitations.  Gastrointestinal:  Says stool output is okay.  Genitourinary: Negative for difficulty urinating, dysuria, hematuria. Denies flank pain. Having nocturia. Reports good urine output.          Objective:       Blood pressure (!) 146/101, pulse 84, height 6' 2" (1.88 m), weight 98.6 kg (217 lb 6 oz), SpO2 97 %.  Physical Exam  Vitals signs reviewed.   Constitutional:       General: He is not in acute distress.     Appearance: Normal appearance. He is well-developed. He is not ill-appearing or diaphoretic.   Cardiac: normal rate  Pulmonary:  NAD  Abdomen: No CVA tenderness  Skin:     General: Skin is warm and dry.   Some brown/red discoloration to BLE  Neurological:      Mental Status: He is alert and oriented to person, place, and time.   Psychiatric:         Mood and Affect: Mood normal.         Behavior: Behavior normal.         Thought Content: Thought content normal.         Judgment: Judgment normal.         Lab Results   Component Value Date "    CREATININE 3.5 (H) 07/27/2022    URICACID 3.6 07/08/2022     Prot/Creat Ratio, Urine   Date Value Ref Range Status   07/08/2022 0.13 0.00 - 0.20 Final   03/04/2022 0.09 0.00 - 0.20 Final   12/16/2021 0.09 0.00 - 0.20 Final     Lab Results   Component Value Date     07/27/2022    K 5.2 (H) 07/27/2022    CO2 20 (L) 07/27/2022     07/27/2022     Lab Results   Component Value Date    PTH 87.6 (H) 07/08/2022    CALCIUM 8.9 07/27/2022    PHOS 3.9 07/08/2022     Lab Results   Component Value Date    HGB 12.3 (L) 07/11/2022    WBC 6.64 07/11/2022    HCT 38.4 (L) 07/11/2022      Lab Results   Component Value Date    HGBA1C 6.8 (H) 07/27/2022     07/11/2022    BUN 45 (H) 07/27/2022     Lab Results   Component Value Date    LDLCALC 72.6 03/04/2022         Outside labs 10/24/19  Crt: 1.5 mg/dL  Hgb: 15.2 g/dL  CO2: 20 mmol/L  K: 5.9 mmol/L  A1c: 7.1%    Assessment:       No diagnosis found.    Plan:     CKD stage 3B c eGFR 43-50 mL/min c superimposed GRADY- 2/2 age-related nephron loss, HTN, previous NSAID use for gout, volume depletion episodes c high ostomy output. Progressive, likely from frequent AKIs 2/2 volume depletion episodes. Recent GRADY was not associated with high ostomy output, though he still has been having SBPs drop to the 80s. Has tilt table test ordered by outside cardiologist.  No longer using NSAIDs;  At risk for progression given frequent AKIs. On PPI.     Has been getting 1L NS weekly.  Uroscopy 8/1/22: a few granular casts.    UPCR No significant proteinuria. Lisinopril previously d/c to help decrease risk of frequent AKIs.     Acid-base WNL    Renal osteodystrophy PTH mildly elevated. Ca WNL. On ergocalciferol.   Anemia At goal for CKD. May be exhibiting hemoconcentration.   DM At goal for CKD.   Lipid Management On statin per PCP     HTN -Still labile. Cardiology typically adjusts medications. On amiodarone 200 mg daily, carvedilol 12.5 mg, amlodipine 2.5 mg (only if SBP is over 140)  On Northera.  - Allow for permissive HTN due to high propensity for hypotensive episodes causing AKIs  - Recommend that SBP be maintained over 110  - aldosterone slightly elevated, possibly 2/2 volume losses through ostomy. Carvedilol may suppress renin.  - Normetanephrine elevation not impressive, especially given level of CKD  - Planning for tilt table test with cardiology          - If patient is holding amlodipine more often than not, recommend that amlodipine is d/c in order to avoid a long-acting medication being used PRN.  Upon chart review, it appears that midodrine was d/c after an admission during which patient experienced hypertension. Recommend d/c florinef and Northera and re-introduce PRN midodrine.  Also recommend pt keep a BP diary to identify time at which his BP drops in order provide recommendations with timing of dosing. Consider lowing dose of carvedilol as well if his BP continues to drop.        - Will fax note and recommendations to cardiologist and defer to cardiologist to make changes so that we are not working against each other with our respective plans.    Hyperkalemia - Mild elevation in setting of volume depletion. Reminded to eat low K diet.    Diarrhea - Need to f/u with GI. Has appt scheduled for September    All questions patient had were answered.  Asked if further questions. None. F/u in 3 mos with labs and urine prior to next visit or sooner if needed.  ER for emergency concerns.    Note: [employer] participant; took 6 mos survey previously.    Summary of Plan:  1. CBC, RFP - consider sodium bicarb  2. Tilt table test with Dr. Dao (spoke to his nurse who said it is ordered and she will assist with scheduling)  3. Will fax note c BP recommendations (above) to cardiologist. Will defer to cardiologist to make changes.  4. avoid NSAID/ bactrim/ IV contrast/ gadolinium/ aminoglycoside where possible  5. RTC as already scheduled

## 2022-08-02 ENCOUNTER — TELEPHONE (OUTPATIENT)
Dept: PRIMARY CARE CLINIC | Facility: CLINIC | Age: 72
End: 2022-08-02
Payer: MEDICARE

## 2022-08-02 NOTE — TELEPHONE ENCOUNTER
----- Message from Hue Simmons sent at 8/2/2022 11:36 AM CDT -----  Contact: Pt Mobile [phone]  Patient is returning a phone call.  Who left a message for the patient:   Does patient know what this is regarding:    Would you like a call back, or a response through your [facility] portal?:  Call  Comments:  Patient said he received a call on today.

## 2022-08-05 ENCOUNTER — INFUSION (OUTPATIENT)
Dept: INFECTIOUS DISEASES | Facility: HOSPITAL | Age: 72
End: 2022-08-05
Attending: INTERNAL MEDICINE
Payer: MEDICARE

## 2022-08-05 VITALS
RESPIRATION RATE: 18 BRPM | BODY MASS INDEX: 27.51 KG/M2 | WEIGHT: 214.31 LBS | OXYGEN SATURATION: 98 % | DIASTOLIC BLOOD PRESSURE: 86 MMHG | HEART RATE: 78 BPM | SYSTOLIC BLOOD PRESSURE: 175 MMHG | TEMPERATURE: 97 F

## 2022-08-05 DIAGNOSIS — E11.22 TYPE 2 DIABETES MELLITUS WITH STAGE 3B CHRONIC KIDNEY DISEASE, WITHOUT LONG-TERM CURRENT USE OF INSULIN: ICD-10-CM

## 2022-08-05 DIAGNOSIS — E86.0 DEHYDRATION: Primary | ICD-10-CM

## 2022-08-05 DIAGNOSIS — Z93.3 COLOSTOMY PRESENT: ICD-10-CM

## 2022-08-05 DIAGNOSIS — N18.32 TYPE 2 DIABETES MELLITUS WITH STAGE 3B CHRONIC KIDNEY DISEASE, WITHOUT LONG-TERM CURRENT USE OF INSULIN: ICD-10-CM

## 2022-08-05 PROCEDURE — 96360 HYDRATION IV INFUSION INIT: CPT

## 2022-08-05 PROCEDURE — 25000003 PHARM REV CODE 250: Performed by: NURSE PRACTITIONER

## 2022-08-05 RX ORDER — HEPARIN 100 UNIT/ML
500 SYRINGE INTRAVENOUS
Status: CANCELLED | OUTPATIENT
Start: 2022-08-11

## 2022-08-05 RX ORDER — SODIUM CHLORIDE 0.9 % (FLUSH) 0.9 %
10 SYRINGE (ML) INJECTION
Status: CANCELLED | OUTPATIENT
Start: 2022-08-11

## 2022-08-05 RX ADMIN — SODIUM CHLORIDE 1000 ML: 0.9 INJECTION, SOLUTION INTRAVENOUS at 10:08

## 2022-08-05 NOTE — PROGRESS NOTES
Pt received 1 liter NS [id number], pt tolerated well & left in NAD, next appt made

## 2022-08-12 ENCOUNTER — INFUSION (OUTPATIENT)
Dept: INFECTIOUS DISEASES | Facility: HOSPITAL | Age: 72
End: 2022-08-12
Attending: INTERNAL MEDICINE
Payer: MEDICARE

## 2022-08-12 VITALS
BODY MASS INDEX: 27.41 KG/M2 | RESPIRATION RATE: 18 BRPM | WEIGHT: 213.5 LBS | HEART RATE: 69 BPM | TEMPERATURE: 97 F | DIASTOLIC BLOOD PRESSURE: 86 MMHG | OXYGEN SATURATION: 97 % | SYSTOLIC BLOOD PRESSURE: 176 MMHG

## 2022-08-12 DIAGNOSIS — E11.22 TYPE 2 DIABETES MELLITUS WITH STAGE 3B CHRONIC KIDNEY DISEASE, WITHOUT LONG-TERM CURRENT USE OF INSULIN: ICD-10-CM

## 2022-08-12 DIAGNOSIS — Z93.3 COLOSTOMY PRESENT: ICD-10-CM

## 2022-08-12 DIAGNOSIS — E86.0 DEHYDRATION: Primary | ICD-10-CM

## 2022-08-12 DIAGNOSIS — N18.32 TYPE 2 DIABETES MELLITUS WITH STAGE 3B CHRONIC KIDNEY DISEASE, WITHOUT LONG-TERM CURRENT USE OF INSULIN: ICD-10-CM

## 2022-08-12 PROCEDURE — 96360 HYDRATION IV INFUSION INIT: CPT

## 2022-08-12 PROCEDURE — 25000003 PHARM REV CODE 250: Performed by: NURSE PRACTITIONER

## 2022-08-12 RX ORDER — SODIUM CHLORIDE 0.9 % (FLUSH) 0.9 %
10 SYRINGE (ML) INJECTION
Status: CANCELLED | OUTPATIENT
Start: 2022-08-19

## 2022-08-12 RX ORDER — HEPARIN 100 UNIT/ML
500 SYRINGE INTRAVENOUS
Status: CANCELLED | OUTPATIENT
Start: 2022-08-19

## 2022-08-12 RX ADMIN — SODIUM CHLORIDE 1000 ML: 0.9 INJECTION, SOLUTION INTRAVENOUS at 08:08

## 2022-08-12 NOTE — PROGRESS NOTES
Pt received 1 liter NS [id number], pt tolerated well & left in NAD, next appt made

## 2022-08-19 ENCOUNTER — INFUSION (OUTPATIENT)
Dept: INFECTIOUS DISEASES | Facility: HOSPITAL | Age: 72
End: 2022-08-19
Attending: INTERNAL MEDICINE
Payer: MEDICARE

## 2022-08-19 VITALS
SYSTOLIC BLOOD PRESSURE: 184 MMHG | HEART RATE: 66 BPM | TEMPERATURE: 96 F | WEIGHT: 216.94 LBS | OXYGEN SATURATION: 96 % | DIASTOLIC BLOOD PRESSURE: 83 MMHG | RESPIRATION RATE: 16 BRPM | BODY MASS INDEX: 27.85 KG/M2

## 2022-08-19 DIAGNOSIS — E86.0 DEHYDRATION: Primary | ICD-10-CM

## 2022-08-19 DIAGNOSIS — E11.22 TYPE 2 DIABETES MELLITUS WITH STAGE 3B CHRONIC KIDNEY DISEASE, WITHOUT LONG-TERM CURRENT USE OF INSULIN: ICD-10-CM

## 2022-08-19 DIAGNOSIS — N18.32 TYPE 2 DIABETES MELLITUS WITH STAGE 3B CHRONIC KIDNEY DISEASE, WITHOUT LONG-TERM CURRENT USE OF INSULIN: ICD-10-CM

## 2022-08-19 DIAGNOSIS — Z93.3 COLOSTOMY PRESENT: ICD-10-CM

## 2022-08-19 PROCEDURE — 25000003 PHARM REV CODE 250: Performed by: NURSE PRACTITIONER

## 2022-08-19 PROCEDURE — 96360 HYDRATION IV INFUSION INIT: CPT

## 2022-08-19 RX ORDER — SODIUM CHLORIDE 0.9 % (FLUSH) 0.9 %
10 SYRINGE (ML) INJECTION
Status: CANCELLED | OUTPATIENT
Start: 2022-08-26

## 2022-08-19 RX ORDER — SODIUM CHLORIDE 0.9 % (FLUSH) 0.9 %
10 SYRINGE (ML) INJECTION
Status: DISCONTINUED | OUTPATIENT
Start: 2022-08-19 | End: 2022-08-19 | Stop reason: HOSPADM

## 2022-08-19 RX ORDER — HEPARIN 100 UNIT/ML
500 SYRINGE INTRAVENOUS
Status: DISCONTINUED | OUTPATIENT
Start: 2022-08-19 | End: 2022-08-19 | Stop reason: HOSPADM

## 2022-08-19 RX ORDER — HEPARIN 100 UNIT/ML
500 SYRINGE INTRAVENOUS
Status: CANCELLED | OUTPATIENT
Start: 2022-08-26

## 2022-08-19 RX ADMIN — SODIUM CHLORIDE 1000 ML: 0.9 INJECTION, SOLUTION INTRAVENOUS at 09:08

## 2022-08-19 NOTE — PROGRESS NOTES
Pt received 1 liter NS x 1hr, pt tolerated well & left in NAD, next appt made

## 2022-08-26 ENCOUNTER — INFUSION (OUTPATIENT)
Dept: INFECTIOUS DISEASES | Facility: HOSPITAL | Age: 72
End: 2022-08-26
Attending: INTERNAL MEDICINE
Payer: MEDICARE

## 2022-08-26 VITALS
HEART RATE: 67 BPM | TEMPERATURE: 97 F | DIASTOLIC BLOOD PRESSURE: 86 MMHG | RESPIRATION RATE: 18 BRPM | WEIGHT: 218.94 LBS | SYSTOLIC BLOOD PRESSURE: 180 MMHG | OXYGEN SATURATION: 96 % | BODY MASS INDEX: 28.11 KG/M2

## 2022-08-26 DIAGNOSIS — E11.22 TYPE 2 DIABETES MELLITUS WITH STAGE 3B CHRONIC KIDNEY DISEASE, WITHOUT LONG-TERM CURRENT USE OF INSULIN: ICD-10-CM

## 2022-08-26 DIAGNOSIS — Z93.3 COLOSTOMY PRESENT: ICD-10-CM

## 2022-08-26 DIAGNOSIS — N18.32 TYPE 2 DIABETES MELLITUS WITH STAGE 3B CHRONIC KIDNEY DISEASE, WITHOUT LONG-TERM CURRENT USE OF INSULIN: ICD-10-CM

## 2022-08-26 DIAGNOSIS — E86.0 DEHYDRATION: Primary | ICD-10-CM

## 2022-08-26 PROCEDURE — 25000003 PHARM REV CODE 250: Performed by: NURSE PRACTITIONER

## 2022-08-26 PROCEDURE — 96360 HYDRATION IV INFUSION INIT: CPT

## 2022-08-26 RX ORDER — HEPARIN 100 UNIT/ML
500 SYRINGE INTRAVENOUS
Status: CANCELLED | OUTPATIENT
Start: 2022-09-02

## 2022-08-26 RX ORDER — SODIUM CHLORIDE 0.9 % (FLUSH) 0.9 %
10 SYRINGE (ML) INJECTION
Status: CANCELLED | OUTPATIENT
Start: 2022-09-02

## 2022-08-26 RX ADMIN — SODIUM CHLORIDE 1000 ML: 0.9 INJECTION, SOLUTION INTRAVENOUS at 09:08

## 2022-08-26 NOTE — PROGRESS NOTES
Pt received 1 liter NS x 1hr, pt tolerated well & left in NAD, next appt made

## 2022-09-02 ENCOUNTER — INFUSION (OUTPATIENT)
Dept: INFECTIOUS DISEASES | Facility: HOSPITAL | Age: 72
End: 2022-09-02
Attending: INTERNAL MEDICINE
Payer: MEDICARE

## 2022-09-02 VITALS
DIASTOLIC BLOOD PRESSURE: 79 MMHG | BODY MASS INDEX: 28.59 KG/M2 | TEMPERATURE: 97 F | SYSTOLIC BLOOD PRESSURE: 180 MMHG | HEART RATE: 67 BPM | WEIGHT: 222.69 LBS | OXYGEN SATURATION: 98 %

## 2022-09-02 DIAGNOSIS — N18.32 TYPE 2 DIABETES MELLITUS WITH STAGE 3B CHRONIC KIDNEY DISEASE, WITHOUT LONG-TERM CURRENT USE OF INSULIN: ICD-10-CM

## 2022-09-02 DIAGNOSIS — E11.22 TYPE 2 DIABETES MELLITUS WITH STAGE 3B CHRONIC KIDNEY DISEASE, WITHOUT LONG-TERM CURRENT USE OF INSULIN: ICD-10-CM

## 2022-09-02 DIAGNOSIS — Z93.3 COLOSTOMY PRESENT: ICD-10-CM

## 2022-09-02 DIAGNOSIS — E86.0 DEHYDRATION: Primary | ICD-10-CM

## 2022-09-02 PROCEDURE — 96360 HYDRATION IV INFUSION INIT: CPT

## 2022-09-02 PROCEDURE — 25000003 PHARM REV CODE 250: Performed by: NURSE PRACTITIONER

## 2022-09-02 RX ORDER — HEPARIN 100 UNIT/ML
500 SYRINGE INTRAVENOUS
Status: CANCELLED | OUTPATIENT
Start: 2022-09-09

## 2022-09-02 RX ORDER — SODIUM CHLORIDE 0.9 % (FLUSH) 0.9 %
10 SYRINGE (ML) INJECTION
Status: CANCELLED | OUTPATIENT
Start: 2022-09-09

## 2022-09-02 RX ADMIN — SODIUM CHLORIDE 1000 ML: 0.9 INJECTION, SOLUTION INTRAVENOUS at 08:09

## 2022-09-02 NOTE — PROGRESS NOTES
Pt received 1 liter NS x 1hr, pt tolerated well & left in NAD, next appt made

## 2022-09-13 ENCOUNTER — TELEPHONE (OUTPATIENT)
Dept: PRIMARY CARE CLINIC | Facility: CLINIC | Age: 72
End: 2022-09-13
Payer: MEDICARE

## 2022-09-13 NOTE — TELEPHONE ENCOUNTER
"----- Message from Nyla Cazares sent at 9/13/2022  1:29 PM CDT -----  Contact: [phone] Patient  Pt states he left 2 messages without a call back from the office. Pt states he does not know what the problem is. Pt refused to give any other information and stated "the heck with the office." Please call and advise.     "

## 2022-09-13 NOTE — TELEPHONE ENCOUNTER
Pt notified labs are in and RX is at pharmacy. Fax number given so he can get results of his tilt test faxed to the office.

## 2022-09-16 ENCOUNTER — INFUSION (OUTPATIENT)
Dept: INFECTIOUS DISEASES | Facility: HOSPITAL | Age: 72
End: 2022-09-16
Attending: INTERNAL MEDICINE
Payer: MEDICARE

## 2022-09-16 VITALS
SYSTOLIC BLOOD PRESSURE: 156 MMHG | HEART RATE: 80 BPM | RESPIRATION RATE: 18 BRPM | DIASTOLIC BLOOD PRESSURE: 88 MMHG | TEMPERATURE: 96 F | BODY MASS INDEX: 28.57 KG/M2 | OXYGEN SATURATION: 96 % | WEIGHT: 222.56 LBS

## 2022-09-16 DIAGNOSIS — Z93.3 COLOSTOMY PRESENT: ICD-10-CM

## 2022-09-16 DIAGNOSIS — E86.0 DEHYDRATION: Primary | ICD-10-CM

## 2022-09-16 DIAGNOSIS — E11.22 TYPE 2 DIABETES MELLITUS WITH STAGE 3B CHRONIC KIDNEY DISEASE, WITHOUT LONG-TERM CURRENT USE OF INSULIN: ICD-10-CM

## 2022-09-16 DIAGNOSIS — N18.32 TYPE 2 DIABETES MELLITUS WITH STAGE 3B CHRONIC KIDNEY DISEASE, WITHOUT LONG-TERM CURRENT USE OF INSULIN: ICD-10-CM

## 2022-09-16 PROCEDURE — 96360 HYDRATION IV INFUSION INIT: CPT

## 2022-09-16 PROCEDURE — 25000003 PHARM REV CODE 250: Performed by: NURSE PRACTITIONER

## 2022-09-16 RX ORDER — HEPARIN 100 UNIT/ML
500 SYRINGE INTRAVENOUS
Status: CANCELLED | OUTPATIENT
Start: 2022-09-23

## 2022-09-16 RX ORDER — SODIUM CHLORIDE 0.9 % (FLUSH) 0.9 %
10 SYRINGE (ML) INJECTION
Status: CANCELLED | OUTPATIENT
Start: 2022-09-23

## 2022-09-16 RX ADMIN — SODIUM CHLORIDE 1000 ML: 0.9 INJECTION, SOLUTION INTRAVENOUS at 08:09

## 2022-09-16 NOTE — PROGRESS NOTES
Pt received 1 liter NS x 1hr, pt tolerated well & left in NAD, next appt made

## 2022-09-21 ENCOUNTER — TELEPHONE (OUTPATIENT)
Dept: PRIMARY CARE CLINIC | Facility: CLINIC | Age: 72
End: 2022-09-21
Payer: MEDICARE

## 2022-09-21 NOTE — TELEPHONE ENCOUNTER
----- Message from Sumaya Moya MA sent at 9/21/2022 11:30 AM CDT -----  Contact: [phone]  Do you know anything about this?  ----- Message -----  From: Mary Browning  Sent: 9/21/2022   9:32 AM CDT  To: [facility] Ashton Staff    Pt called to check the status of some paper work he left for the provider to fill out. Please Advise

## 2022-09-21 NOTE — TELEPHONE ENCOUNTER
I spoke with the patient and he said that he dropped off paperwork with [facility]. Fax [phone]. He said it needs to go to [facility] [employer] [facility] store on [facility]    Custom shoes    Custom Inserts

## 2022-09-22 ENCOUNTER — PATIENT OUTREACH (OUTPATIENT)
Dept: ADMINISTRATIVE | Facility: HOSPITAL | Age: 72
End: 2022-09-22
Payer: MEDICARE

## 2022-09-22 NOTE — PROGRESS NOTES
Health Maintenance Due   Topic Date Due    High Dose Statin  Never done    Diabetes Urine Screening  01/17/2018    Eye Exam  09/24/2021    Influenza Vaccine (1) 09/01/2022     Chart review done. HM updated. Immunizations reviewed & updated. Care Everywhere updated.

## 2022-09-22 NOTE — TELEPHONE ENCOUNTER
I called the patient to try and review results and let him know [facility] said she didn't have any papers. I let him know Dr. Gonzalez is also out until Tuesday when his appointment is scheduled and requested that he bring paperwork then. The patient got angry and mentioned he might not come in and will be looking for a new doctor at [facility] and hung up.

## 2022-09-22 NOTE — TELEPHONE ENCOUNTER
----- Message from Poli Gonzalez MD sent at 9/22/2022  5:01 AM CDT -----  Please call the patient regarding his abnormal result. CRI no change sl anemia no change high glucose 169 ask pt if fast for labs

## 2022-09-23 ENCOUNTER — INFUSION (OUTPATIENT)
Dept: INFECTIOUS DISEASES | Facility: HOSPITAL | Age: 72
End: 2022-09-23
Attending: INTERNAL MEDICINE
Payer: MEDICARE

## 2022-09-23 VITALS
BODY MASS INDEX: 28.35 KG/M2 | OXYGEN SATURATION: 98 % | TEMPERATURE: 97 F | WEIGHT: 220.81 LBS | DIASTOLIC BLOOD PRESSURE: 80 MMHG | HEART RATE: 74 BPM | SYSTOLIC BLOOD PRESSURE: 157 MMHG

## 2022-09-23 DIAGNOSIS — N18.32 TYPE 2 DIABETES MELLITUS WITH STAGE 3B CHRONIC KIDNEY DISEASE, WITHOUT LONG-TERM CURRENT USE OF INSULIN: ICD-10-CM

## 2022-09-23 DIAGNOSIS — Z93.3 COLOSTOMY PRESENT: ICD-10-CM

## 2022-09-23 DIAGNOSIS — E86.0 DEHYDRATION: Primary | ICD-10-CM

## 2022-09-23 DIAGNOSIS — E11.22 TYPE 2 DIABETES MELLITUS WITH STAGE 3B CHRONIC KIDNEY DISEASE, WITHOUT LONG-TERM CURRENT USE OF INSULIN: ICD-10-CM

## 2022-09-23 PROCEDURE — 96360 HYDRATION IV INFUSION INIT: CPT

## 2022-09-23 PROCEDURE — 25000003 PHARM REV CODE 250: Performed by: NURSE PRACTITIONER

## 2022-09-23 RX ORDER — SODIUM CHLORIDE 0.9 % (FLUSH) 0.9 %
10 SYRINGE (ML) INJECTION
Status: CANCELLED | OUTPATIENT
Start: 2022-09-30

## 2022-09-23 RX ORDER — HEPARIN 100 UNIT/ML
500 SYRINGE INTRAVENOUS
Status: CANCELLED | OUTPATIENT
Start: 2022-09-30

## 2022-09-23 RX ADMIN — SODIUM CHLORIDE 1000 ML: 0.9 INJECTION, SOLUTION INTRAVENOUS at 09:09

## 2022-09-23 NOTE — PROGRESS NOTES
Pt received 1 liter NS x 1hr, pt tolerated well & left in NAD, next appt made

## 2022-09-27 ENCOUNTER — OFFICE VISIT (OUTPATIENT)
Dept: PRIMARY CARE CLINIC | Facility: CLINIC | Age: 72
End: 2022-09-27
Payer: MEDICARE

## 2022-09-27 ENCOUNTER — TELEPHONE (OUTPATIENT)
Dept: PRIMARY CARE CLINIC | Facility: CLINIC | Age: 72
End: 2022-09-27
Payer: MEDICARE

## 2022-09-27 ENCOUNTER — PATIENT MESSAGE (OUTPATIENT)
Dept: PRIMARY CARE CLINIC | Facility: CLINIC | Age: 72
End: 2022-09-27

## 2022-09-27 VITALS
DIASTOLIC BLOOD PRESSURE: 82 MMHG | BODY MASS INDEX: 27.73 KG/M2 | WEIGHT: 216.06 LBS | SYSTOLIC BLOOD PRESSURE: 128 MMHG | RESPIRATION RATE: 18 BRPM | HEIGHT: 74 IN | HEART RATE: 66 BPM | OXYGEN SATURATION: 96 %

## 2022-09-27 DIAGNOSIS — N18.32 STAGE 3B CHRONIC KIDNEY DISEASE: ICD-10-CM

## 2022-09-27 DIAGNOSIS — G63 POLYNEUROPATHY ASSOCIATED WITH UNDERLYING DISEASE: ICD-10-CM

## 2022-09-27 DIAGNOSIS — Z23 NEED FOR VACCINATION: ICD-10-CM

## 2022-09-27 DIAGNOSIS — N18.32 TYPE 2 DIABETES MELLITUS WITH STAGE 3B CHRONIC KIDNEY DISEASE, WITHOUT LONG-TERM CURRENT USE OF INSULIN: ICD-10-CM

## 2022-09-27 DIAGNOSIS — E78.5 HYPERLIPIDEMIA, UNSPECIFIED HYPERLIPIDEMIA TYPE: ICD-10-CM

## 2022-09-27 DIAGNOSIS — I73.9 CLAUDICATION: ICD-10-CM

## 2022-09-27 DIAGNOSIS — R23.0 BLUE TOES: Primary | ICD-10-CM

## 2022-09-27 DIAGNOSIS — E11.22 TYPE 2 DIABETES MELLITUS WITH STAGE 3B CHRONIC KIDNEY DISEASE, WITHOUT LONG-TERM CURRENT USE OF INSULIN: ICD-10-CM

## 2022-09-27 PROCEDURE — 99214 OFFICE O/P EST MOD 30 MIN: CPT | Mod: S$GLB,,, | Performed by: INTERNAL MEDICINE

## 2022-09-27 PROCEDURE — 3079F DIAST BP 80-89 MM HG: CPT | Mod: CPTII,S$GLB,, | Performed by: INTERNAL MEDICINE

## 2022-09-27 PROCEDURE — 3008F PR BODY MASS INDEX (BMI) DOCUMENTED: ICD-10-PCS | Mod: CPTII,S$GLB,, | Performed by: INTERNAL MEDICINE

## 2022-09-27 PROCEDURE — 1101F PT FALLS ASSESS-DOCD LE1/YR: CPT | Mod: CPTII,S$GLB,, | Performed by: INTERNAL MEDICINE

## 2022-09-27 PROCEDURE — 99999 PR PBB SHADOW E&M-EST. PATIENT-LVL V: CPT | Mod: PBBFAC,,, | Performed by: INTERNAL MEDICINE

## 2022-09-27 PROCEDURE — 3288F PR FALLS RISK ASSESSMENT DOCUMENTED: ICD-10-PCS | Mod: CPTII,S$GLB,, | Performed by: INTERNAL MEDICINE

## 2022-09-27 PROCEDURE — 99499 UNLISTED E&M SERVICE: CPT | Mod: HCNC,S$GLB,, | Performed by: INTERNAL MEDICINE

## 2022-09-27 PROCEDURE — 1159F PR MEDICATION LIST DOCUMENTED IN MEDICAL RECORD: ICD-10-PCS | Mod: CPTII,S$GLB,, | Performed by: INTERNAL MEDICINE

## 2022-09-27 PROCEDURE — 90694 VACC AIIV4 NO PRSRV 0.5ML IM: CPT | Mod: S$GLB,,, | Performed by: INTERNAL MEDICINE

## 2022-09-27 PROCEDURE — 3074F PR MOST RECENT SYSTOLIC BLOOD PRESSURE < 130 MM HG: ICD-10-PCS | Mod: CPTII,S$GLB,, | Performed by: INTERNAL MEDICINE

## 2022-09-27 PROCEDURE — 1125F PR PAIN SEVERITY QUANTIFIED, PAIN PRESENT: ICD-10-PCS | Mod: CPTII,S$GLB,, | Performed by: INTERNAL MEDICINE

## 2022-09-27 PROCEDURE — 99999 PR PBB SHADOW E&M-EST. PATIENT-LVL V: ICD-10-PCS | Mod: PBBFAC,,, | Performed by: INTERNAL MEDICINE

## 2022-09-27 PROCEDURE — 3288F FALL RISK ASSESSMENT DOCD: CPT | Mod: CPTII,S$GLB,, | Performed by: INTERNAL MEDICINE

## 2022-09-27 PROCEDURE — 3074F SYST BP LT 130 MM HG: CPT | Mod: CPTII,S$GLB,, | Performed by: INTERNAL MEDICINE

## 2022-09-27 PROCEDURE — 3066F PR DOCUMENTATION OF TREATMENT FOR NEPHROPATHY: ICD-10-PCS | Mod: CPTII,S$GLB,, | Performed by: INTERNAL MEDICINE

## 2022-09-27 PROCEDURE — 3079F PR MOST RECENT DIASTOLIC BLOOD PRESSURE 80-89 MM HG: ICD-10-PCS | Mod: CPTII,S$GLB,, | Performed by: INTERNAL MEDICINE

## 2022-09-27 PROCEDURE — 1125F AMNT PAIN NOTED PAIN PRSNT: CPT | Mod: CPTII,S$GLB,, | Performed by: INTERNAL MEDICINE

## 2022-09-27 PROCEDURE — G0008 FLU VACCINE - QUADRIVALENT - ADJUVANTED: ICD-10-PCS | Mod: S$GLB,,, | Performed by: INTERNAL MEDICINE

## 2022-09-27 PROCEDURE — 90694 FLU VACCINE - QUADRIVALENT - ADJUVANTED: ICD-10-PCS | Mod: S$GLB,,, | Performed by: INTERNAL MEDICINE

## 2022-09-27 PROCEDURE — 1160F PR REVIEW ALL MEDS BY PRESCRIBER/CLIN PHARMACIST DOCUMENTED: ICD-10-PCS | Mod: CPTII,S$GLB,, | Performed by: INTERNAL MEDICINE

## 2022-09-27 PROCEDURE — 3008F BODY MASS INDEX DOCD: CPT | Mod: CPTII,S$GLB,, | Performed by: INTERNAL MEDICINE

## 2022-09-27 PROCEDURE — 1101F PR PT FALLS ASSESS DOC 0-1 FALLS W/OUT INJ PAST YR: ICD-10-PCS | Mod: CPTII,S$GLB,, | Performed by: INTERNAL MEDICINE

## 2022-09-27 PROCEDURE — 1159F MED LIST DOCD IN RCRD: CPT | Mod: CPTII,S$GLB,, | Performed by: INTERNAL MEDICINE

## 2022-09-27 PROCEDURE — 3066F NEPHROPATHY DOC TX: CPT | Mod: CPTII,S$GLB,, | Performed by: INTERNAL MEDICINE

## 2022-09-27 PROCEDURE — 3044F HG A1C LEVEL LT 7.0%: CPT | Mod: CPTII,S$GLB,, | Performed by: INTERNAL MEDICINE

## 2022-09-27 PROCEDURE — G0008 ADMIN INFLUENZA VIRUS VAC: HCPCS | Mod: S$GLB,,, | Performed by: INTERNAL MEDICINE

## 2022-09-27 PROCEDURE — 99214 PR OFFICE/OUTPT VISIT, EST, LEVL IV, 30-39 MIN: ICD-10-PCS | Mod: S$GLB,,, | Performed by: INTERNAL MEDICINE

## 2022-09-27 PROCEDURE — 1160F RVW MEDS BY RX/DR IN RCRD: CPT | Mod: CPTII,S$GLB,, | Performed by: INTERNAL MEDICINE

## 2022-09-27 PROCEDURE — 3044F PR MOST RECENT HEMOGLOBIN A1C LEVEL <7.0%: ICD-10-PCS | Mod: CPTII,S$GLB,, | Performed by: INTERNAL MEDICINE

## 2022-09-27 RX ORDER — PRAVASTATIN SODIUM 40 MG/1
80 TABLET ORAL DAILY
Qty: 180 TABLET | Refills: 3 | Status: SHIPPED | OUTPATIENT
Start: 2022-09-27 | End: 2022-09-27 | Stop reason: RX

## 2022-09-27 RX ORDER — PRAVASTATIN SODIUM 40 MG/1
80 TABLET ORAL DAILY
Qty: 180 TABLET | Refills: 3 | Status: SHIPPED | OUTPATIENT
Start: 2022-09-27 | End: 2023-02-01

## 2022-09-27 NOTE — TELEPHONE ENCOUNTER
Faxed [facility] to [facility] for the patient's eye exam (Dr. Phan Calix)    Fax [phone]

## 2022-09-27 NOTE — PROGRESS NOTES
Verified pt ID using name and . NDKA. Administered Influenza in Left deltoid per physician order using aseptic technique. Aspirated and no blood return noted. Pt tolerated well with no adverse reactions noted.

## 2022-09-28 NOTE — PROGRESS NOTES
Subjective:       Patient ID: Jarrett Charlton Jr. is a 72 y.o. male.    Chief Complaint: Follow-up (3 months)    HPI  Pt visit today for routine f/u he ha scolostomy doing well current no high OP failure . He has CRI has been stable f/u with neprhology  kidney u/s medical ds . pT states his toes has been turning blue in color bilaterally  the other days witnessed by his daughter he is ok now he is seeing cardiology Dr Dao did Tilt table test   Review of Systems    Objective:      Physical Exam  Vitals and nursing note reviewed.   Constitutional:       General: He is not in acute distress.     Appearance: He is well-developed.   HENT:      Head: Normocephalic and atraumatic.      Right Ear: External ear normal.      Left Ear: External ear normal.      Nose: Nose normal.      Mouth/Throat:      Pharynx: No oropharyngeal exudate.   Eyes:      General:         Right eye: No discharge.         Left eye: No discharge.      Conjunctiva/sclera: Conjunctivae normal.      Pupils: Pupils are equal, round, and reactive to light.   Neck:      Thyroid: No thyromegaly.   Cardiovascular:      Rate and Rhythm: Normal rate and regular rhythm.      Heart sounds: Normal heart sounds. No murmur heard.    No friction rub. No gallop.      Comments: Left foot good pulses rt foot not able to plapate DP and PT pulses bilaterally  Pulmonary:      Effort: Pulmonary effort is normal. No respiratory distress.      Breath sounds: Normal breath sounds. No wheezing.   Abdominal:      General: Bowel sounds are normal. There is no distension.      Palpations: Abdomen is soft.      Tenderness: There is no abdominal tenderness.      Comments: Colostomy in place   Musculoskeletal:         General: No tenderness or deformity. Normal range of motion.      Cervical back: Normal range of motion and neck supple.   Lymphadenopathy:      Cervical: No cervical adenopathy.   Skin:     General: Skin is warm and dry.      Capillary Refill: Capillary refill  takes less than 2 seconds.      Findings: No erythema or rash.   Neurological:      Mental Status: He is alert and oriented to person, place, and time.   Psychiatric:         Thought Content: Thought content normal.         Judgment: Judgment normal.       Assessment:       1. Blue toes    2. Need for vaccination    3. Type 2 diabetes mellitus with stage 3b chronic kidney disease, without long-term current use of insulin    4. Polyneuropathy associated with underlying disease    5. Hyperlipidemia, unspecified hyperlipidemia type    6. Stage 3b chronic kidney disease    7. Claudication          Plan:       Blue toes  -     US Lower Extremity Arteries Bilateral; Future; Expected date: 09/28/2022    Need for vaccination  -     Influenza (FLUAD) - Quadrivalent (Adjuvanted) *Preferred* (65+) (PF)    Type 2 diabetes mellitus with stage 3b chronic kidney disease, without long-term current use of insulin  -     DIABETIC SHOES FOR HOME USE  -     Basic Metabolic Panel; Future; Expected date: 09/27/2022  -     Hemoglobin A1C; Future; Expected date: 09/27/2022  -     Microalbumin/Creatinine Ratio, Urine; Future; Expected date: 09/27/2022    Polyneuropathy associated with underlying disease  -     DIABETIC SHOES FOR HOME USE    Hyperlipidemia, unspecified hyperlipidemia type  -     Discontinue: pravastatin (PRAVACHOL) 40 MG tablet; Take 2 tablets (80 mg total) by mouth once daily. 80mg daily  Dispense: 180 tablet; Refill: 3  -     pravastatin (PRAVACHOL) 40 MG tablet; Take 2 tablets (80 mg total) by mouth once daily. 80mg daily  Dispense: 180 tablet; Refill: 3    Stage 3b chronic kidney disease  Comments:  creatinine stable no change continu ewith tx and avoid all NSAIDS    Claudication  -     US Lower Extremity Arteries Bilateral; Future; Expected date: 09/28/2022      Medication List with Changes/Refills   Current Medications    ACCU-CHEK PAUL CONTROL SOLN SOLN    1 drop by NOT APPLICABLE route once daily.    ACCU-CHEK  SOFTCLIX LANCETS MISC    1 lancet by NOT APPLICABLE route once daily.    ALLOPURINOL (ZYLOPRIM) 300 MG TABLET    Take 1.5 tablets (450 mg total) by mouth once daily.    ALPRAZOLAM (XANAX) 0.5 MG TABLET    Take 0.5 mg by mouth nightly as needed for Anxiety.    AMIODARONE (PACERONE) 200 MG TAB    Take 1 tablet (200 mg total) by mouth 2 (two) times daily.    AMLODIPINE (NORVASC) 5 MG TABLET    Take 5 mg by mouth once daily.    ASPIRIN (ECOTRIN) 81 MG EC TABLET    Take 81 mg by mouth once daily.    BD ALCOHOL SWABS PADM        BLOOD SUGAR DIAGNOSTIC (ONE TOUCH ULTRA TEST) [facility]    USE ONE STRIP TO CHECK GLUCOSE EVERY DAY    BRIMONIDINE 0.2% (ALPHAGAN) 0.2 % DROP    INSTILL 1 DROP INTO EACH EYE TWICE DAILY    CALCIUM CITRATE-VITAMIN D3 (CITRACAL + D MAXIMUM) 315 MG-6.25 MCG (250 UNIT) TAB    Take 1 tablet by mouth once daily.    CARVEDILOL (COREG) 12.5 MG TABLET    Take 12.5 mg by mouth once daily.    CLOPIDOGREL (PLAVIX) 75 MG TABLET    Take 75 mg by mouth once daily.    DROXIDOPA 100 MG CAP    Take 2 capsules by mouth once daily. 400mg daily    FIBER, CALCIUM POLYCARBOPHIL, 625 MG TABLET    Take 3 tablets by mouth before dinner.    GABAPENTIN (NEURONTIN) 600 MG TABLET    Take 1 tablet (600 mg total) by mouth 2 (two) times daily.    GLIMEPIRIDE (AMARYL) 4 MG TABLET    Take 4 mg by mouth 2 (two) times daily before meals.    HYDROCODONE-ACETAMINOPHEN (NORCO) 5-325 MG PER TABLET    Take 1 tablet by mouth every 12 (twelve) hours as needed for Pain.    MAGNESIUM OXIDE (MAG-OX) 400 MG (241.3 MG MAGNESIUM) TABLET    Take 400 mg by mouth once daily.    PANTOPRAZOLE (PROTONIX) 40 MG TABLET    Take 40 mg by mouth once daily.   Changed and/or Refilled Medications    Modified Medication Previous Medication    PRAVASTATIN (PRAVACHOL) 40 MG TABLET pravastatin (PRAVACHOL) 40 MG tablet       Take 2 tablets (80 mg total) by mouth once daily. 80mg daily    Take 1 tablet (40 mg total) by mouth once daily.   Discontinued Medications     FLUDROCORTISONE (FLORINEF) 0.1 MG TAB        MECLIZINE (ANTIVERT) 12.5 MG TABLET    Take 1 tablet (12.5 mg total) by mouth 3 (three) times daily as needed for Dizziness.    METHYLPREDNISOLONE (MEDROL DOSEPACK) 4 MG TABLET    use as directed

## 2022-09-30 ENCOUNTER — INFUSION (OUTPATIENT)
Dept: INFECTIOUS DISEASES | Facility: HOSPITAL | Age: 72
End: 2022-09-30
Attending: INTERNAL MEDICINE
Payer: MEDICARE

## 2022-09-30 VITALS
HEART RATE: 66 BPM | OXYGEN SATURATION: 99 % | TEMPERATURE: 97 F | DIASTOLIC BLOOD PRESSURE: 82 MMHG | SYSTOLIC BLOOD PRESSURE: 155 MMHG

## 2022-09-30 DIAGNOSIS — E86.0 DEHYDRATION: Primary | ICD-10-CM

## 2022-09-30 DIAGNOSIS — E11.22 TYPE 2 DIABETES MELLITUS WITH STAGE 3B CHRONIC KIDNEY DISEASE, WITHOUT LONG-TERM CURRENT USE OF INSULIN: ICD-10-CM

## 2022-09-30 DIAGNOSIS — Z93.3 COLOSTOMY PRESENT: ICD-10-CM

## 2022-09-30 DIAGNOSIS — N18.32 TYPE 2 DIABETES MELLITUS WITH STAGE 3B CHRONIC KIDNEY DISEASE, WITHOUT LONG-TERM CURRENT USE OF INSULIN: ICD-10-CM

## 2022-09-30 PROCEDURE — 96361 HYDRATE IV INFUSION ADD-ON: CPT

## 2022-09-30 PROCEDURE — 25000003 PHARM REV CODE 250: Performed by: NURSE PRACTITIONER

## 2022-09-30 PROCEDURE — 96360 HYDRATION IV INFUSION INIT: CPT

## 2022-09-30 RX ORDER — SODIUM CHLORIDE 0.9 % (FLUSH) 0.9 %
10 SYRINGE (ML) INJECTION
Status: CANCELLED | OUTPATIENT
Start: 2022-10-07

## 2022-09-30 RX ORDER — HEPARIN 100 UNIT/ML
500 SYRINGE INTRAVENOUS
Status: CANCELLED | OUTPATIENT
Start: 2022-10-07

## 2022-09-30 RX ADMIN — SODIUM CHLORIDE 1000 ML: 0.9 INJECTION, SOLUTION INTRAVENOUS at 10:09

## 2022-09-30 NOTE — PROGRESS NOTES
Pt received 1 liter NS x 1hr, pt tolerated well & left in NAD, next appt made

## 2022-10-07 ENCOUNTER — INFUSION (OUTPATIENT)
Dept: INFECTIOUS DISEASES | Facility: HOSPITAL | Age: 72
End: 2022-10-07
Attending: INTERNAL MEDICINE
Payer: MEDICARE

## 2022-10-07 VITALS
DIASTOLIC BLOOD PRESSURE: 69 MMHG | SYSTOLIC BLOOD PRESSURE: 130 MMHG | TEMPERATURE: 97 F | OXYGEN SATURATION: 98 % | HEART RATE: 70 BPM | WEIGHT: 220.44 LBS | RESPIRATION RATE: 18 BRPM | BODY MASS INDEX: 28.31 KG/M2

## 2022-10-07 DIAGNOSIS — E11.22 TYPE 2 DIABETES MELLITUS WITH STAGE 3B CHRONIC KIDNEY DISEASE, WITHOUT LONG-TERM CURRENT USE OF INSULIN: ICD-10-CM

## 2022-10-07 DIAGNOSIS — E86.0 DEHYDRATION: Primary | ICD-10-CM

## 2022-10-07 DIAGNOSIS — Z93.3 COLOSTOMY PRESENT: ICD-10-CM

## 2022-10-07 DIAGNOSIS — N18.32 TYPE 2 DIABETES MELLITUS WITH STAGE 3B CHRONIC KIDNEY DISEASE, WITHOUT LONG-TERM CURRENT USE OF INSULIN: ICD-10-CM

## 2022-10-07 PROCEDURE — 25000003 PHARM REV CODE 250: Performed by: NURSE PRACTITIONER

## 2022-10-07 PROCEDURE — 96360 HYDRATION IV INFUSION INIT: CPT

## 2022-10-07 RX ORDER — SODIUM CHLORIDE 0.9 % (FLUSH) 0.9 %
10 SYRINGE (ML) INJECTION
Status: CANCELLED | OUTPATIENT
Start: 2022-10-14

## 2022-10-07 RX ORDER — HEPARIN 100 UNIT/ML
500 SYRINGE INTRAVENOUS
Status: CANCELLED | OUTPATIENT
Start: 2022-10-14

## 2022-10-07 RX ADMIN — SODIUM CHLORIDE 1000 ML: 0.9 INJECTION, SOLUTION INTRAVENOUS at 09:10

## 2022-10-07 NOTE — PROGRESS NOTES
Pt received 1 liter NS x 1hr, pt tolerated well & left in NAD, next appt made

## 2022-10-12 NOTE — PROGRESS NOTES
Subjective:       Patient ID:  Jarrett Charlton Jr. is a 68 y.o. male who presents for   Chief Complaint   Patient presents with    Skin Check     f/u Efudex, bilat arms, better, Tx. Efudex      HPI  Pt with multiple h/o multiple AKs, SCCIS, SCC.  SCC left hand s/p scoop shave with negative margins in October 2018.  Had poor response to PDT (120 minute incubation) Feb. 2018.  Efudex works well, but it is cost prohibitive for him to use it bid for 2-3 weeks as directed.  He has been using it to his hands/forearms 1-3 times weekly.    Review of Systems   Constitutional: Negative for fever, chills, weight loss, weight gain, fatigue, night sweats and malaise.   Skin: Positive for daily sunscreen use and activity-related sunscreen use.        Objective:    Physical Exam   Constitutional: He appears well-developed and well-nourished.   Neurological: He is alert.   Skin:   Areas Examined (abnormalities noted in diagram):   Head / Face Inspection Performed  Neck Inspection Performed  RUE Inspected  LUE Inspection Performed              Diagram Legend     Erythematous scaling macule/papule c/w actinic keratosis       Vascular papule c/w angioma      Pigmented verrucoid papule/plaque c/w seborrheic keratosis      Yellow umbilicated papule c/w sebaceous hyperplasia      Irregularly shaped tan macule c/w lentigo     1-2 mm smooth white papules consistent with Milia      Movable subcutaneous cyst with punctum c/w epidermal inclusion cyst      Subcutaneous movable cyst c/w pilar cyst      Firm pink to brown papule c/w dermatofibroma      Pedunculated fleshy papule(s) c/w skin tag(s)      Evenly pigmented macule c/w junctional nevus     Mildly variegated pigmented, slightly irregular-bordered macule c/w mildly atypical nevus      Flesh colored to evenly pigmented papule c/w intradermal nevus       Pink pearly papule/plaque c/w basal cell carcinoma      Erythematous hyperkeratotic cursted plaque c/w SCC      Surgical scar with  no sign of skin cancer recurrence      Open and closed comedones      Inflammatory papules and pustules      Verrucoid papule consistent consistent with wart     Erythematous eczematous patches and plaques     Dystrophic onycholytic nail with subungual debris c/w onychomycosis     Umbilicated papule    Erythematous-base heme-crusted tan verrucoid plaque consistent with inflamed seborrheic keratosis     Erythematous Silvery Scaling Plaque c/w Psoriasis     See annotation      Assessment / Plan:        Multiple actinic keratoses  -     TOLAK 4 % Crea; Apply 1 application topically once daily. To arms and hands for 3 weeks  Dispense: 80 g; Refill: 3    Squamous cell carcinoma in situ (SCCIS) of skin  -     TOLAK 4 % Crea; Apply 1 application topically once daily. To arms and hands for 3 weeks  Dispense: 80 g; Refill: 3             Follow-up in about 3 months (around 3/13/2019).   Previously Declined (within the last year)

## 2022-10-14 ENCOUNTER — INFUSION (OUTPATIENT)
Dept: INFECTIOUS DISEASES | Facility: HOSPITAL | Age: 72
End: 2022-10-14
Attending: INTERNAL MEDICINE
Payer: MEDICARE

## 2022-10-14 VITALS
BODY MASS INDEX: 28.29 KG/M2 | OXYGEN SATURATION: 96 % | RESPIRATION RATE: 18 BRPM | DIASTOLIC BLOOD PRESSURE: 87 MMHG | SYSTOLIC BLOOD PRESSURE: 165 MMHG | WEIGHT: 220.38 LBS | TEMPERATURE: 99 F | HEART RATE: 75 BPM

## 2022-10-14 DIAGNOSIS — E11.22 TYPE 2 DIABETES MELLITUS WITH STAGE 3B CHRONIC KIDNEY DISEASE, WITHOUT LONG-TERM CURRENT USE OF INSULIN: ICD-10-CM

## 2022-10-14 DIAGNOSIS — N18.32 TYPE 2 DIABETES MELLITUS WITH STAGE 3B CHRONIC KIDNEY DISEASE, WITHOUT LONG-TERM CURRENT USE OF INSULIN: ICD-10-CM

## 2022-10-14 DIAGNOSIS — Z93.3 COLOSTOMY PRESENT: ICD-10-CM

## 2022-10-14 DIAGNOSIS — E86.0 DEHYDRATION: Primary | ICD-10-CM

## 2022-10-14 PROCEDURE — 96360 HYDRATION IV INFUSION INIT: CPT

## 2022-10-14 PROCEDURE — 96361 HYDRATE IV INFUSION ADD-ON: CPT

## 2022-10-14 PROCEDURE — 25000003 PHARM REV CODE 250: Performed by: NURSE PRACTITIONER

## 2022-10-14 RX ORDER — SODIUM CHLORIDE 0.9 % (FLUSH) 0.9 %
10 SYRINGE (ML) INJECTION
Status: CANCELLED | OUTPATIENT
Start: 2022-10-21

## 2022-10-14 RX ORDER — HEPARIN 100 UNIT/ML
500 SYRINGE INTRAVENOUS
Status: CANCELLED | OUTPATIENT
Start: 2022-10-21

## 2022-10-14 RX ADMIN — SODIUM CHLORIDE 1000 ML: 0.9 INJECTION, SOLUTION INTRAVENOUS at 09:10

## 2022-10-14 NOTE — PROGRESS NOTES
Pt received 1 liter NS x 1hr, pt tolerated well & left in NAD, next appt made

## 2022-10-18 ENCOUNTER — TELEPHONE (OUTPATIENT)
Dept: PRIMARY CARE CLINIC | Facility: CLINIC | Age: 72
End: 2022-10-18
Payer: MEDICARE

## 2022-10-18 NOTE — TELEPHONE ENCOUNTER
I spoke with the patient and relayed his results. If he has issues again, he will call to make an appointment or I let him know we can refer him [facility]

## 2022-10-18 NOTE — TELEPHONE ENCOUNTER
----- Message from Jess Silverio sent at 10/18/2022 11:07 AM CDT -----  Contact: [place], [phone]  2TESTRESULTS    Type: Test Results    What test was performed?  Ultrasound    Who ordered the test?  Dr Gonzalez    When and where were the test performed? 10/03/2022    Would you like response via [email or web address]: Call back    Comments:  Please call him. Thanks.

## 2022-10-21 ENCOUNTER — TELEPHONE (OUTPATIENT)
Dept: PRIMARY CARE CLINIC | Facility: CLINIC | Age: 72
End: 2022-10-21
Payer: MEDICARE

## 2022-10-21 ENCOUNTER — INFUSION (OUTPATIENT)
Dept: INFECTIOUS DISEASES | Facility: HOSPITAL | Age: 72
End: 2022-10-21
Attending: INTERNAL MEDICINE
Payer: MEDICARE

## 2022-10-21 ENCOUNTER — OFFICE VISIT (OUTPATIENT)
Dept: PRIMARY CARE CLINIC | Facility: CLINIC | Age: 72
End: 2022-10-21
Payer: MEDICARE

## 2022-10-21 VITALS
WEIGHT: 220.44 LBS | DIASTOLIC BLOOD PRESSURE: 79 MMHG | HEART RATE: 71 BPM | SYSTOLIC BLOOD PRESSURE: 161 MMHG | BODY MASS INDEX: 28.31 KG/M2 | TEMPERATURE: 97 F | OXYGEN SATURATION: 97 %

## 2022-10-21 VITALS — DIASTOLIC BLOOD PRESSURE: 68 MMHG | SYSTOLIC BLOOD PRESSURE: 128 MMHG

## 2022-10-21 DIAGNOSIS — E11.22 TYPE 2 DIABETES MELLITUS WITH STAGE 3B CHRONIC KIDNEY DISEASE, WITHOUT LONG-TERM CURRENT USE OF INSULIN: ICD-10-CM

## 2022-10-21 DIAGNOSIS — N18.32 TYPE 2 DIABETES MELLITUS WITH STAGE 3B CHRONIC KIDNEY DISEASE, WITHOUT LONG-TERM CURRENT USE OF INSULIN: ICD-10-CM

## 2022-10-21 DIAGNOSIS — E86.0 DEHYDRATION: Primary | ICD-10-CM

## 2022-10-21 DIAGNOSIS — L03.116 CELLULITIS OF LEFT ANKLE: Primary | ICD-10-CM

## 2022-10-21 DIAGNOSIS — Z93.3 COLOSTOMY PRESENT: ICD-10-CM

## 2022-10-21 PROCEDURE — 99442 PR PHYSICIAN TELEPHONE EVALUATION 11-20 MIN: ICD-10-PCS | Mod: 95,,, | Performed by: INTERNAL MEDICINE

## 2022-10-21 PROCEDURE — 1160F PR REVIEW ALL MEDS BY PRESCRIBER/CLIN PHARMACIST DOCUMENTED: ICD-10-PCS | Mod: CPTII,95,, | Performed by: INTERNAL MEDICINE

## 2022-10-21 PROCEDURE — 3044F HG A1C LEVEL LT 7.0%: CPT | Mod: CPTII,95,, | Performed by: INTERNAL MEDICINE

## 2022-10-21 PROCEDURE — 96360 HYDRATION IV INFUSION INIT: CPT

## 2022-10-21 PROCEDURE — 3066F NEPHROPATHY DOC TX: CPT | Mod: CPTII,95,, | Performed by: INTERNAL MEDICINE

## 2022-10-21 PROCEDURE — 3066F PR DOCUMENTATION OF TREATMENT FOR NEPHROPATHY: ICD-10-PCS | Mod: CPTII,95,, | Performed by: INTERNAL MEDICINE

## 2022-10-21 PROCEDURE — 3044F PR MOST RECENT HEMOGLOBIN A1C LEVEL <7.0%: ICD-10-PCS | Mod: CPTII,95,, | Performed by: INTERNAL MEDICINE

## 2022-10-21 PROCEDURE — 1160F RVW MEDS BY RX/DR IN RCRD: CPT | Mod: CPTII,95,, | Performed by: INTERNAL MEDICINE

## 2022-10-21 PROCEDURE — 1159F MED LIST DOCD IN RCRD: CPT | Mod: CPTII,95,, | Performed by: INTERNAL MEDICINE

## 2022-10-21 PROCEDURE — 1159F PR MEDICATION LIST DOCUMENTED IN MEDICAL RECORD: ICD-10-PCS | Mod: CPTII,95,, | Performed by: INTERNAL MEDICINE

## 2022-10-21 PROCEDURE — 25000003 PHARM REV CODE 250: Performed by: NURSE PRACTITIONER

## 2022-10-21 PROCEDURE — 99442 PR PHYSICIAN TELEPHONE EVALUATION 11-20 MIN: CPT | Mod: 95,,, | Performed by: INTERNAL MEDICINE

## 2022-10-21 RX ORDER — HEPARIN 100 UNIT/ML
500 SYRINGE INTRAVENOUS
Status: CANCELLED | OUTPATIENT
Start: 2022-10-28

## 2022-10-21 RX ORDER — DOXYCYCLINE 100 MG/1
100 CAPSULE ORAL EVERY 12 HOURS
Qty: 20 CAPSULE | Refills: 0 | Status: SHIPPED | OUTPATIENT
Start: 2022-10-21 | End: 2022-10-31

## 2022-10-21 RX ORDER — HYDROCODONE BITARTRATE AND ACETAMINOPHEN 5; 325 MG/1; MG/1
1 TABLET ORAL EVERY 12 HOURS PRN
Qty: 10 TABLET | Refills: 0 | Status: SHIPPED | OUTPATIENT
Start: 2022-10-21 | End: 2022-12-06

## 2022-10-21 RX ORDER — SODIUM CHLORIDE 0.9 % (FLUSH) 0.9 %
10 SYRINGE (ML) INJECTION
Status: CANCELLED | OUTPATIENT
Start: 2022-10-28

## 2022-10-21 RX ADMIN — SODIUM CHLORIDE 1000 ML: 0.9 INJECTION, SOLUTION INTRAVENOUS at 09:10

## 2022-10-21 NOTE — PROGRESS NOTES
Pt received 1 liter NS x 1hr, pt tolerated well & left in NAD, next appt made

## 2022-10-21 NOTE — TELEPHONE ENCOUNTER
I spoke with the patient and let him know that Dr. Gonzalez will call him this evening and he checked his BP earlier and it was 128/68

## 2022-10-21 NOTE — PROGRESS NOTES
Subjective:    The patient location is: home  The chief complaint leading to consultation is: left ankle pain    Visit type: audio only    Face to Face time with patient: 12   minutes of total time spent on the encounter, which includes face to face time and non-face to face time preparing to see the patient (eg, review of tests), Obtaining and/or reviewing separately obtained history, Documenting clinical information in the electronic or other health record, Independently interpreting results (not separately reported) and communicating results to the patient/family/caregiver, or Care coordination (not separately reported).         Each patient to whom he or she provides medical services by telemedicine is:  (1) informed of the relationship between the physician and patient and the respective role of any other health care provider with respect to management of the patient; and (2) notified that he or she may decline to receive medical services by telemedicine and may withdraw from such care at any time.    Notes:     Patient ID: Jarrett Charlton Jr. is a 72 y.o. male.    Chief Complaint: No chief complaint on file.    HPI  Pt with h/o HTN DM CRI and colostomy he c/o left ankle pain at small spot below medial malleolus look like small bruise very painful pt already try lidoderm patch and theragesic cream volteran gel  he denies any trauma or h/o gout due to above medical conditions not able to use steroid NSAIDS  dis cuss with pt need to be seen or text picture of ankle pt not able to do it phone not equipped to text pisture  Review of Systems    Objective:      Physical Exam  pt is not in any distress c/o pain in skin below left ankle joint   Assessment:       1. Cellulitis of left ankle          Plan:       Cellulitis of left ankle  Comments:  will treat as infection since pt already try other treatments not effective f/u in clnic in 2 days if not better  Orders:  -     doxycycline (VIBRAMYCIN) 100 MG Cap; Take 1  capsule (100 mg total) by mouth every 12 (twelve) hours. for 10 days  Dispense: 20 capsule; Refill: 0  -     HYDROcodone-acetaminophen (NORCO) 5-325 mg per tablet; Take 1 tablet by mouth every 12 (twelve) hours as needed for Pain.  Dispense: 10 tablet; Refill: 0      Medication List with Changes/Refills   New Medications    DOXYCYCLINE (VIBRAMYCIN) 100 MG CAP    Take 1 capsule (100 mg total) by mouth every 12 (twelve) hours. for 10 days    HYDROCODONE-ACETAMINOPHEN (NORCO) 5-325 MG PER TABLET    Take 1 tablet by mouth every 12 (twelve) hours as needed for Pain.   Current Medications    ACCU-CHEK PAUL CONTROL SOLN SOLN    1 drop by NOT APPLICABLE route once daily.    ACCU-CHEK SOFTCLIX LANCETS MISC    1 lancet by NOT APPLICABLE route once daily.    ALLOPURINOL (ZYLOPRIM) 300 MG TABLET    Take 1.5 tablets (450 mg total) by mouth once daily.    ALPRAZOLAM (XANAX) 0.5 MG TABLET    Take 0.5 mg by mouth nightly as needed for Anxiety.    AMIODARONE (PACERONE) 200 MG TAB    Take 1 tablet (200 mg total) by mouth 2 (two) times daily.    AMLODIPINE (NORVASC) 5 MG TABLET    Take 5 mg by mouth once daily.    ASPIRIN (ECOTRIN) 81 MG EC TABLET    Take 81 mg by mouth once daily.    BD ALCOHOL SWABS PADM        BLOOD SUGAR DIAGNOSTIC (ONE TOUCH ULTRA TEST) [facility]    USE ONE STRIP TO CHECK GLUCOSE EVERY DAY    BRIMONIDINE 0.2% (ALPHAGAN) 0.2 % DROP    INSTILL 1 DROP INTO EACH EYE TWICE DAILY    CALCIUM CITRATE-VITAMIN D3 (CITRACAL + D MAXIMUM) 315 MG-6.25 MCG (250 UNIT) TAB    Take 1 tablet by mouth once daily.    CARVEDILOL (COREG) 12.5 MG TABLET    Take 12.5 mg by mouth once daily.    CLOPIDOGREL (PLAVIX) 75 MG TABLET    Take 75 mg by mouth once daily.    DROXIDOPA 100 MG CAP    Take 2 capsules by mouth once daily. 400mg daily    FIBER, CALCIUM POLYCARBOPHIL, 625 MG TABLET    Take 3 tablets by mouth before dinner.    GABAPENTIN (NEURONTIN) 600 MG TABLET    Take 1 tablet (600 mg total) by mouth 2 (two) times daily.    GLIMEPIRIDE  (AMARYL) 4 MG TABLET    Take 4 mg by mouth 2 (two) times daily before meals.    MAGNESIUM OXIDE (MAG-OX) 400 MG (241.3 MG MAGNESIUM) TABLET    Take 400 mg by mouth once daily.    PANTOPRAZOLE (PROTONIX) 40 MG TABLET    Take 40 mg by mouth once daily.    PRAVASTATIN (PRAVACHOL) 40 MG TABLET    Take 2 tablets (80 mg total) by mouth once daily. 80mg daily   Discontinued Medications    HYDROCODONE-ACETAMINOPHEN (NORCO) 5-325 MG PER TABLET    Take 1 tablet by mouth every 12 (twelve) hours as needed for Pain.

## 2022-10-21 NOTE — TELEPHONE ENCOUNTER
----- Message from Jess Silverio sent at 10/21/2022  4:10 PM CDT -----  Contact: Patient, [phone]  Calling to speak with the nurse regarding his leg pain. Please call him. Thanks.

## 2022-10-28 ENCOUNTER — PATIENT OUTREACH (OUTPATIENT)
Dept: ADMINISTRATIVE | Facility: HOSPITAL | Age: 72
End: 2022-10-28
Payer: MEDICARE

## 2022-10-28 ENCOUNTER — INFUSION (OUTPATIENT)
Dept: INFECTIOUS DISEASES | Facility: HOSPITAL | Age: 72
End: 2022-10-28
Attending: INTERNAL MEDICINE
Payer: MEDICARE

## 2022-10-28 VITALS
TEMPERATURE: 97 F | WEIGHT: 218.94 LBS | DIASTOLIC BLOOD PRESSURE: 74 MMHG | RESPIRATION RATE: 15 BRPM | OXYGEN SATURATION: 99 % | SYSTOLIC BLOOD PRESSURE: 147 MMHG | BODY MASS INDEX: 28.1 KG/M2 | HEART RATE: 72 BPM | HEIGHT: 74 IN

## 2022-10-28 DIAGNOSIS — Z93.3 COLOSTOMY PRESENT: ICD-10-CM

## 2022-10-28 DIAGNOSIS — N18.32 TYPE 2 DIABETES MELLITUS WITH STAGE 3B CHRONIC KIDNEY DISEASE, WITHOUT LONG-TERM CURRENT USE OF INSULIN: ICD-10-CM

## 2022-10-28 DIAGNOSIS — E11.22 TYPE 2 DIABETES MELLITUS WITH STAGE 3B CHRONIC KIDNEY DISEASE, WITHOUT LONG-TERM CURRENT USE OF INSULIN: ICD-10-CM

## 2022-10-28 DIAGNOSIS — E86.0 DEHYDRATION: Primary | ICD-10-CM

## 2022-10-28 PROCEDURE — 25000003 PHARM REV CODE 250: Performed by: NURSE PRACTITIONER

## 2022-10-28 PROCEDURE — 96360 HYDRATION IV INFUSION INIT: CPT

## 2022-10-28 RX ORDER — HEPARIN 100 UNIT/ML
500 SYRINGE INTRAVENOUS
Status: CANCELLED | OUTPATIENT
Start: 2022-11-04

## 2022-10-28 RX ORDER — SODIUM CHLORIDE 0.9 % (FLUSH) 0.9 %
10 SYRINGE (ML) INJECTION
Status: CANCELLED | OUTPATIENT
Start: 2022-11-04

## 2022-10-28 RX ORDER — SODIUM CHLORIDE 0.9 % (FLUSH) 0.9 %
10 SYRINGE (ML) INJECTION
Status: DISCONTINUED | OUTPATIENT
Start: 2022-10-28 | End: 2022-10-28 | Stop reason: HOSPADM

## 2022-10-28 RX ADMIN — SODIUM CHLORIDE 1000 ML: 0.9 INJECTION, SOLUTION INTRAVENOUS at 09:10

## 2022-10-28 NOTE — PLAN OF CARE
Problem: Infection  Goal: Absence of Infection Signs and Symptoms  Outcome: Ongoing, Progressing  Intervention: Prevent or Manage Infection  Flowsheets (Taken 10/28/2022 1329)  Infection Management: aseptic technique maintained  Isolation Precautions:   precautions initiated   precautions maintained

## 2022-10-28 NOTE — PROGRESS NOTES
Pt received 1 liter NS x 1hr, pt tolerated well & left in NAD, next appt made

## 2022-10-31 RX ORDER — GABAPENTIN 600 MG/1
600 TABLET ORAL 2 TIMES DAILY
Qty: 180 TABLET | Refills: 0 | Status: SHIPPED | OUTPATIENT
Start: 2022-10-31 | End: 2022-11-04 | Stop reason: SDUPTHER

## 2022-11-03 ENCOUNTER — PATIENT OUTREACH (OUTPATIENT)
Dept: ADMINISTRATIVE | Facility: HOSPITAL | Age: 72
End: 2022-11-03
Payer: MEDICARE

## 2022-11-03 NOTE — PROGRESS NOTES
Health Maintenance Due   Topic Date Due    Diabetes Urine Screening  01/17/2018    Eye Exam  09/24/2021    COVID-19 Vaccine (5 - Booster for Pfizer series) 09/09/2022     Chart review done. HM updated. Immunizations reviewed & updated. Care Everywhere updated.

## 2022-11-04 ENCOUNTER — OFFICE VISIT (OUTPATIENT)
Dept: RHEUMATOLOGY | Facility: CLINIC | Age: 72
End: 2022-11-04
Payer: MEDICARE

## 2022-11-04 ENCOUNTER — INFUSION (OUTPATIENT)
Dept: INFECTIOUS DISEASES | Facility: HOSPITAL | Age: 72
End: 2022-11-04
Attending: INTERNAL MEDICINE
Payer: MEDICARE

## 2022-11-04 ENCOUNTER — OFFICE VISIT (OUTPATIENT)
Dept: NEPHROLOGY | Facility: CLINIC | Age: 72
End: 2022-11-04
Payer: MEDICARE

## 2022-11-04 VITALS
RESPIRATION RATE: 20 BRPM | BODY MASS INDEX: 28.1 KG/M2 | OXYGEN SATURATION: 97 % | WEIGHT: 218.94 LBS | HEART RATE: 71 BPM | DIASTOLIC BLOOD PRESSURE: 89 MMHG | TEMPERATURE: 97 F | HEIGHT: 74 IN | SYSTOLIC BLOOD PRESSURE: 171 MMHG

## 2022-11-04 VITALS
OXYGEN SATURATION: 99 % | HEIGHT: 74 IN | SYSTOLIC BLOOD PRESSURE: 142 MMHG | BODY MASS INDEX: 28.23 KG/M2 | WEIGHT: 220 LBS | DIASTOLIC BLOOD PRESSURE: 83 MMHG | HEART RATE: 72 BPM

## 2022-11-04 VITALS
BODY MASS INDEX: 28.77 KG/M2 | SYSTOLIC BLOOD PRESSURE: 129 MMHG | WEIGHT: 224.19 LBS | HEIGHT: 74 IN | HEART RATE: 70 BPM | DIASTOLIC BLOOD PRESSURE: 84 MMHG

## 2022-11-04 DIAGNOSIS — R19.7 DIARRHEA, UNSPECIFIED TYPE: ICD-10-CM

## 2022-11-04 DIAGNOSIS — M15.9 GENERALIZED OSTEOARTHRITIS: ICD-10-CM

## 2022-11-04 DIAGNOSIS — N18.32 TYPE 2 DIABETES MELLITUS WITH STAGE 3B CHRONIC KIDNEY DISEASE, WITHOUT LONG-TERM CURRENT USE OF INSULIN: ICD-10-CM

## 2022-11-04 DIAGNOSIS — E11.42 TYPE 2 DIABETES MELLITUS WITH DIABETIC POLYNEUROPATHY, WITHOUT LONG-TERM CURRENT USE OF INSULIN: ICD-10-CM

## 2022-11-04 DIAGNOSIS — E11.22 TYPE 2 DIABETES MELLITUS WITH STAGE 3B CHRONIC KIDNEY DISEASE, WITHOUT LONG-TERM CURRENT USE OF INSULIN: ICD-10-CM

## 2022-11-04 DIAGNOSIS — I10 HYPERTENSION, UNSPECIFIED TYPE: ICD-10-CM

## 2022-11-04 DIAGNOSIS — N25.81 SECONDARY HYPERPARATHYROIDISM: ICD-10-CM

## 2022-11-04 DIAGNOSIS — G90.1 DYSAUTONOMIA: ICD-10-CM

## 2022-11-04 DIAGNOSIS — N18.32 STAGE 3B CHRONIC KIDNEY DISEASE: Primary | ICD-10-CM

## 2022-11-04 DIAGNOSIS — N18.32 CHRONIC RENAL IMPAIRMENT, STAGE 3B: ICD-10-CM

## 2022-11-04 DIAGNOSIS — M1A.09X1 IDIOPATHIC CHRONIC GOUT OF MULTIPLE SITES WITH TOPHUS: Primary | ICD-10-CM

## 2022-11-04 DIAGNOSIS — E86.0 DEHYDRATION: Primary | ICD-10-CM

## 2022-11-04 DIAGNOSIS — Z93.3 COLOSTOMY PRESENT: ICD-10-CM

## 2022-11-04 DIAGNOSIS — D64.9 ANEMIA, UNSPECIFIED TYPE: ICD-10-CM

## 2022-11-04 PROCEDURE — 99999 PR PBB SHADOW E&M-EST. PATIENT-LVL IV: ICD-10-PCS | Mod: PBBFAC,,, | Performed by: INTERNAL MEDICINE

## 2022-11-04 PROCEDURE — 99213 OFFICE O/P EST LOW 20 MIN: CPT | Mod: S$GLB,,, | Performed by: INTERNAL MEDICINE

## 2022-11-04 PROCEDURE — 3044F PR MOST RECENT HEMOGLOBIN A1C LEVEL <7.0%: ICD-10-PCS | Mod: CPTII,S$GLB,, | Performed by: INTERNAL MEDICINE

## 2022-11-04 PROCEDURE — 3044F HG A1C LEVEL LT 7.0%: CPT | Mod: CPTII,S$GLB,, | Performed by: NURSE PRACTITIONER

## 2022-11-04 PROCEDURE — 3074F PR MOST RECENT SYSTOLIC BLOOD PRESSURE < 130 MM HG: ICD-10-PCS | Mod: CPTII,S$GLB,, | Performed by: INTERNAL MEDICINE

## 2022-11-04 PROCEDURE — 3066F PR DOCUMENTATION OF TREATMENT FOR NEPHROPATHY: ICD-10-PCS | Mod: CPTII,S$GLB,, | Performed by: INTERNAL MEDICINE

## 2022-11-04 PROCEDURE — 3008F PR BODY MASS INDEX (BMI) DOCUMENTED: ICD-10-PCS | Mod: CPTII,S$GLB,, | Performed by: INTERNAL MEDICINE

## 2022-11-04 PROCEDURE — 25000003 PHARM REV CODE 250: Performed by: NURSE PRACTITIONER

## 2022-11-04 PROCEDURE — 3066F NEPHROPATHY DOC TX: CPT | Mod: CPTII,S$GLB,, | Performed by: NURSE PRACTITIONER

## 2022-11-04 PROCEDURE — 3008F BODY MASS INDEX DOCD: CPT | Mod: CPTII,S$GLB,, | Performed by: INTERNAL MEDICINE

## 2022-11-04 PROCEDURE — 99999 PR PBB SHADOW E&M-EST. PATIENT-LVL V: CPT | Mod: PBBFAC,,, | Performed by: NURSE PRACTITIONER

## 2022-11-04 PROCEDURE — 99999 PR PBB SHADOW E&M-EST. PATIENT-LVL IV: CPT | Mod: PBBFAC,,, | Performed by: INTERNAL MEDICINE

## 2022-11-04 PROCEDURE — 96360 HYDRATION IV INFUSION INIT: CPT

## 2022-11-04 PROCEDURE — 1125F AMNT PAIN NOTED PAIN PRSNT: CPT | Mod: CPTII,S$GLB,, | Performed by: INTERNAL MEDICINE

## 2022-11-04 PROCEDURE — 3077F PR MOST RECENT SYSTOLIC BLOOD PRESSURE >= 140 MM HG: ICD-10-PCS | Mod: CPTII,S$GLB,, | Performed by: NURSE PRACTITIONER

## 2022-11-04 PROCEDURE — 1159F MED LIST DOCD IN RCRD: CPT | Mod: CPTII,S$GLB,, | Performed by: NURSE PRACTITIONER

## 2022-11-04 PROCEDURE — 3008F PR BODY MASS INDEX (BMI) DOCUMENTED: ICD-10-PCS | Mod: CPTII,S$GLB,, | Performed by: NURSE PRACTITIONER

## 2022-11-04 PROCEDURE — 99499 UNLISTED E&M SERVICE: CPT | Mod: HCNC,S$GLB,, | Performed by: INTERNAL MEDICINE

## 2022-11-04 PROCEDURE — 3288F FALL RISK ASSESSMENT DOCD: CPT | Mod: CPTII,S$GLB,, | Performed by: NURSE PRACTITIONER

## 2022-11-04 PROCEDURE — 3008F BODY MASS INDEX DOCD: CPT | Mod: CPTII,S$GLB,, | Performed by: NURSE PRACTITIONER

## 2022-11-04 PROCEDURE — 99214 OFFICE O/P EST MOD 30 MIN: CPT | Mod: S$GLB,,, | Performed by: NURSE PRACTITIONER

## 2022-11-04 PROCEDURE — 1125F PR PAIN SEVERITY QUANTIFIED, PAIN PRESENT: ICD-10-PCS | Mod: CPTII,S$GLB,, | Performed by: INTERNAL MEDICINE

## 2022-11-04 PROCEDURE — 3077F SYST BP >= 140 MM HG: CPT | Mod: CPTII,S$GLB,, | Performed by: NURSE PRACTITIONER

## 2022-11-04 PROCEDURE — 99999 PR PBB SHADOW E&M-EST. PATIENT-LVL V: ICD-10-PCS | Mod: PBBFAC,,, | Performed by: NURSE PRACTITIONER

## 2022-11-04 PROCEDURE — 99214 PR OFFICE/OUTPT VISIT, EST, LEVL IV, 30-39 MIN: ICD-10-PCS | Mod: S$GLB,,, | Performed by: NURSE PRACTITIONER

## 2022-11-04 PROCEDURE — 1101F PT FALLS ASSESS-DOCD LE1/YR: CPT | Mod: CPTII,S$GLB,, | Performed by: NURSE PRACTITIONER

## 2022-11-04 PROCEDURE — 3079F DIAST BP 80-89 MM HG: CPT | Mod: CPTII,S$GLB,, | Performed by: NURSE PRACTITIONER

## 2022-11-04 PROCEDURE — 3079F PR MOST RECENT DIASTOLIC BLOOD PRESSURE 80-89 MM HG: ICD-10-PCS | Mod: CPTII,S$GLB,, | Performed by: NURSE PRACTITIONER

## 2022-11-04 PROCEDURE — 99499 UNLISTED E&M SERVICE: CPT | Mod: HCNC,S$GLB,, | Performed by: NURSE PRACTITIONER

## 2022-11-04 PROCEDURE — 3288F PR FALLS RISK ASSESSMENT DOCUMENTED: ICD-10-PCS | Mod: CPTII,S$GLB,, | Performed by: NURSE PRACTITIONER

## 2022-11-04 PROCEDURE — 3066F PR DOCUMENTATION OF TREATMENT FOR NEPHROPATHY: ICD-10-PCS | Mod: CPTII,S$GLB,, | Performed by: NURSE PRACTITIONER

## 2022-11-04 PROCEDURE — 1160F RVW MEDS BY RX/DR IN RCRD: CPT | Mod: CPTII,S$GLB,, | Performed by: NURSE PRACTITIONER

## 2022-11-04 PROCEDURE — 3044F HG A1C LEVEL LT 7.0%: CPT | Mod: CPTII,S$GLB,, | Performed by: INTERNAL MEDICINE

## 2022-11-04 PROCEDURE — 1101F PR PT FALLS ASSESS DOC 0-1 FALLS W/OUT INJ PAST YR: ICD-10-PCS | Mod: CPTII,S$GLB,, | Performed by: NURSE PRACTITIONER

## 2022-11-04 PROCEDURE — 3079F PR MOST RECENT DIASTOLIC BLOOD PRESSURE 80-89 MM HG: ICD-10-PCS | Mod: CPTII,S$GLB,, | Performed by: INTERNAL MEDICINE

## 2022-11-04 PROCEDURE — 3044F PR MOST RECENT HEMOGLOBIN A1C LEVEL <7.0%: ICD-10-PCS | Mod: CPTII,S$GLB,, | Performed by: NURSE PRACTITIONER

## 2022-11-04 PROCEDURE — 3079F DIAST BP 80-89 MM HG: CPT | Mod: CPTII,S$GLB,, | Performed by: INTERNAL MEDICINE

## 2022-11-04 PROCEDURE — 1160F PR REVIEW ALL MEDS BY PRESCRIBER/CLIN PHARMACIST DOCUMENTED: ICD-10-PCS | Mod: CPTII,S$GLB,, | Performed by: NURSE PRACTITIONER

## 2022-11-04 PROCEDURE — 3066F NEPHROPATHY DOC TX: CPT | Mod: CPTII,S$GLB,, | Performed by: INTERNAL MEDICINE

## 2022-11-04 PROCEDURE — 1159F PR MEDICATION LIST DOCUMENTED IN MEDICAL RECORD: ICD-10-PCS | Mod: CPTII,S$GLB,, | Performed by: NURSE PRACTITIONER

## 2022-11-04 PROCEDURE — 1126F AMNT PAIN NOTED NONE PRSNT: CPT | Mod: CPTII,S$GLB,, | Performed by: NURSE PRACTITIONER

## 2022-11-04 PROCEDURE — 3074F SYST BP LT 130 MM HG: CPT | Mod: CPTII,S$GLB,, | Performed by: INTERNAL MEDICINE

## 2022-11-04 PROCEDURE — 99213 PR OFFICE/OUTPT VISIT, EST, LEVL III, 20-29 MIN: ICD-10-PCS | Mod: S$GLB,,, | Performed by: INTERNAL MEDICINE

## 2022-11-04 PROCEDURE — 1126F PR PAIN SEVERITY QUANTIFIED, NO PAIN PRESENT: ICD-10-PCS | Mod: CPTII,S$GLB,, | Performed by: NURSE PRACTITIONER

## 2022-11-04 RX ORDER — GABAPENTIN 600 MG/1
600 TABLET ORAL 2 TIMES DAILY
Qty: 180 TABLET | Refills: 3 | Status: SHIPPED | OUTPATIENT
Start: 2022-11-04 | End: 2022-12-06

## 2022-11-04 RX ORDER — SODIUM CHLORIDE 0.9 % (FLUSH) 0.9 %
10 SYRINGE (ML) INJECTION
Status: CANCELLED | OUTPATIENT
Start: 2022-11-11

## 2022-11-04 RX ORDER — HEPARIN 100 UNIT/ML
500 SYRINGE INTRAVENOUS
Status: CANCELLED | OUTPATIENT
Start: 2022-11-11

## 2022-11-04 RX ORDER — FLUDROCORTISONE ACETATE 0.1 MG/1
TABLET ORAL
COMMUNITY
Start: 2022-10-22 | End: 2022-12-20

## 2022-11-04 RX ORDER — MIDODRINE HYDROCHLORIDE 5 MG/1
5 TABLET ORAL 2 TIMES DAILY PRN
Status: ON HOLD | COMMUNITY
Start: 2022-11-03 | End: 2022-12-07 | Stop reason: SDUPTHER

## 2022-11-04 RX ADMIN — SODIUM CHLORIDE 1000 ML: 0.9 INJECTION, SOLUTION INTRAVENOUS at 08:11

## 2022-11-04 ASSESSMENT — ROUTINE ASSESSMENT OF PATIENT INDEX DATA (RAPID3)
PSYCHOLOGICAL DISTRESS SCORE: 2.2
PATIENT GLOBAL ASSESSMENT SCORE: 4.5
FATIGUE SCORE: 0
PAIN SCORE: 5
TOTAL RAPID3 SCORE: 3.28
AM STIFFNESS SCORE: 0, NO
MDHAQ FUNCTION SCORE: 0.1

## 2022-11-04 NOTE — PROGRESS NOTES
Pt received 1 liter NS x 1hr, pt tolerated well & left in NAD, next appt made

## 2022-11-04 NOTE — PROGRESS NOTES
History of present illness:  72-year-old gentleman who has been followed in the rheumatology department since 2004. He was originally seen by Dr. Darby and then in 2009 he was seen by Dr. Steward.  I have been following him since 2010. He has chronic gouty arthritis.  He has been on allopurinol 450 mg daily.  He has a history of osteoarthritis, especially of his hands.  He also has ulcerative colitis and has had previous surgical procedures.  He has frequent hospitalizations for dehydration.    I saw him last in May.  He was having problems with his left 3rd finger.  He had flexor tendinitis.  I told him to use Voltaren gel.  His hands are actually doing better.  He is had no recent gout attacks.      He has chronic renal insufficiency which has not increased recently.  He was told to stop the allopurinol 2 months ago because of his kidney functions.  Fortunately he has not had any gout attacks.      Was having episodes of blue toes.  This would occur when he took off his shoes.  It would last about 30 minutes.  It was not related to cold exposure.  He had a vascular workup which was negative.  Got bigger shoes and he has had no recurrence.  He does continue to have swelling in his legs.    He is complaining of some pain in his left ankle.  Describes it as an electric shock.  He is on gabapentin for diabetic neuropathy.    Physical examination:  Skin:  He has stasis dermatitis on both legs, worse on the right than on the left.    Extremities:  He has 2+ peripheral pulses bilaterally.    Musculoskeletal:  Shoulders, elbows, wrists are unremarkable.    He has bony hypertrophy of the PIP bilaterally and the left 3rd MCP.  He has no synovitis in the hands.    Knees are unremarkable.    He has bony hypertrophy of the ankles.  He has bilateral bunion and bunionette deformities.  He has no synovitis in his feet.    Assessment:  1.  Inter critical gout   2. He was having vascular insufficiency, probably from wearing the  wrong size shoe   3. Chronic renal insufficiency, which has not increased recently  4.  Osteoarthritis  5.  Diabetic neuropathy     Plans:  1.  Resume allopurinol.  To take 150 mg daily for 1 week, 300 mg daily for 1 week, then resume 450 mg daily.  The allopurinol dosage does not need to be adjusted for renal insufficiency.  We are careful when we 1st start somebody on allopurinol with renal insufficiency and start on a lower dosage because of an increased risk of allopurinol hypersensitivity.  This does not increase when a patient has been on allopurinol as long as Mr. Charlton has  2. Continue gabapentin as before   3.  Return in 12 months

## 2022-11-04 NOTE — PROGRESS NOTES
"  Subjective:       Patient ID: Jarrett Charlton Jr. is a 72 y.o. White male who presents for follow-up evaluation of   CKD, hyperkalemia    HPI    Last seen by me in May 2021. Previously seen by Dr. Lyon in Aug 2019.      Patient presents for f/u of CKD.  Baseline creatinine had been 1.3-1.5 c frequent increases to 1.6-1.8 range an occasional AKIs c higher sCr. Had GRADY in late December 2019 c peak sCr of 2.3 mg/dL 2/2 volume depletion from high output in ileostomy. Has had hypomagnesemia in the past.  Had K of 5.9 in November; provided with K finder. Since December 2019, new sCr baseline 1.4-1.6 mg/dL. Has been 2.0-2.1 since May 2021.    Home BPs: high in morning; 15 minutes after waking it decreases and is "low all day" until about 5 p.m., it goes up, then he takes his medications.  Recent loop recorder implanted. Has been getting IVF weekly to every other week by cardiologist at [place]. Also recently started Florinef by PCP.    Heart cath on 4/15/21; no significant blockages. Was sent to [facility] and told he needs a pacemaker. Says this is scheduled 5/13. Has been told to be taking pedialyte; still with diarrhea occasionally but much improved overall.    Significant medical problems include Hypertension for 5 years, DM for 3 yrs, gout, ulcerative colitis with ostomy and hyperlipidemia, prostate cancer    The patient denies taking NSAIDs.  Had IV contrast with cardiologist for MRI on 4/3/2020.     Social history: retired, took care of wheelchair-bound wife, but she passed away in July 2020  Significant family hx includes: no known kidney disease in family.    Last renal US: Sept 2019, reviewed. Simple left renal cyst.    Update August 2021:  Colostomy output still more solid "oatmeal" texture with water. Takes immodium if output is more liquid.  Still getting IVF infusions every other week by cardiology.  No recent syncopal episodes.  Home BPs: occasionally SBP dropping to 100; takes BP prior to taking meds " "and holds meds if needed.  Did not take meds yet today because pressure was low today.  Now on Northera for low BP per cardiology.    Denies NSAIDs; no recent hospitalizations.     Update December 2021:   Returns for f/u of CKD.  Baseline sCr 1.7-1.8 mg/dL.  Home BPs: 130-160/60-90; occasionally drops to SBP of high 80s and takes midodrine   Feels dizzy and lightheaded when blood glucose drops below 80  Denies NSAIDs; no recent hospitalizations.   Diarrhea now being treated by Dr. Baum as IBS. Viberzi was just approved but has not been started yet. Still with intermittent diarrhea. Still getting IVF per cardiology every other week.    Update 4/4/22:  Returns for f/u of CKD.  Baseline sCr 1.7-1.8 mg/dL.  Recent sCr 1.3-1.5 during admission for small bowel obstruction March 9-March 11.  Still gets IVF c Dr. Dao (cardiology) q 2 weeks. Dr. Dao is moving to [place], so pt may not be able to drive for this every two weeks. Next IVF is scheduled for 4/15/22.  Home BPs: labile at home. He takes [place] if SBP gets as low as 110 and he takes his antihypertensives when SBP gets to 130-140. He took his antihypertensive medication while in the office today.    Update 4/11/22:  Returns for f/u of CKD.  Baseline sCr 1.7-1.8 mg/dL.  After appt on 4/4, pt's sCr returned at 3.0. Sent to ED. He was given IVF and had ileoscopy. sCr returned to previous baseline prior to discharge on 4/6.    Had IVF infusion on Friday 4/8/22 c cardiology. Had "bad" diarrhea on Saturday morning. Took immodium. He also vomited x 3. He became very weak and could not get off the bathroom floor, where he was sitting to be near toilet. Eventually he regained strength and was able to get up. He did not fall.  He has been drinking plenty of fluid and electrolyte fluids since that time. Stool output has returned to normal. No more vomiting. Says he feels "great" now.    Said he did a tilt table test years ago (before blood pressure issues), and it was " "normal at that time. Says drifts to the side when he walks. Says he gets dizzy if he stands up too fast.    Update 7/11/22:  Returns for f/u of CKD.  Was in ER with hypotension (SBPs 70s) 2/2 diarrhea output. Says he starts to feel tired when SBP drops to 105 mmHg.  Taking metamucil daily to help make stool more firm. Takes immodium when having diarrhea episodes. Says he tried to see GI but was unable to get an appt.  Home BPs: labile 80s-160s/60-90s. Followed closely by Dr. Dao  (outside cariologist).  Recently increased droxidopa to 600 mg BID per Dr. Dao. Also requesting to have weekly IVF instead every other week.    Update 8/1/22:  Returns for f/u GRADY.  sCr 3.5 mg/dL recently.  SBP in 80s frequently. Now on Northera 600 mg 4x/day per cardiologist. Carvedilol reduced to 6.25 .  Says he has not had liquid stools.  Says his cardiologist wants him to have tilt table test.    Update 11/4/22:  Returns for f/u of CKD.  Since last visit, he had a Tilt table test with his cardiologist. It was positive for "significant orthostatic hypotension and probably neurocardiogenic syncope of the mixed type." Cardiologist increased midodrine yesterday to 5 mg (from 2.5 mg) due to SBP in 80s.  No recent labs.  Home BPs: SBP 80s since Tuesday  Had 1L NS this morning.    Review of Systems   Cardiovascular:  Reports swelling in the legs. Denies chest pain, palpitations.  Gastrointestinal:  Says stool output is okay.  Genitourinary: Negative for difficulty urinating, dysuria, hematuria. Denies flank pain. Having nocturia. Reports good urine output.          Objective:       Blood pressure (!) 142/83, pulse 72, height 6' 2" (1.88 m), weight 99.8 kg (220 lb 0.3 oz), SpO2 99 %.  Physical Exam  Vitals signs reviewed.   Constitutional:       General: He is not in acute distress.     Appearance: Normal appearance. He is well-developed. He is not ill-appearing or diaphoretic.   Cardiac: normal rate  Pulmonary:  NAD  Abdomen: No CVA " tenderness  Skin:     General: Skin is warm and dry.   Some brown/red discoloration to BLE  Neurological:      Mental Status: He is alert and oriented to person, place, and time.   Psychiatric:         Mood and Affect: Mood normal.         Behavior: Behavior normal.         Thought Content: Thought content normal.         Judgment: Judgment normal.         Lab Results   Component Value Date    CREATININE 2.2 (H) 09/20/2022    URICACID 4.8 09/20/2022     Prot/Creat Ratio, Urine   Date Value Ref Range Status   07/08/2022 0.13 0.00 - 0.20 Final   03/04/2022 0.09 0.00 - 0.20 Final   12/16/2021 0.09 0.00 - 0.20 Final     Lab Results   Component Value Date     09/20/2022    K 4.2 09/20/2022    CO2 26 09/20/2022     09/20/2022     Lab Results   Component Value Date    PTH 87.6 (H) 07/08/2022    CALCIUM 8.6 (L) 09/20/2022    PHOS 3.3 08/01/2022     Lab Results   Component Value Date    HGB 11.8 (L) 09/20/2022    WBC 5.60 09/20/2022    HCT 37.6 (L) 09/20/2022      Lab Results   Component Value Date    HGBA1C 6.8 (H) 07/27/2022     09/20/2022    BUN 32 (H) 09/20/2022     Lab Results   Component Value Date    LDLCALC 80.6 09/20/2022         Outside labs 10/24/19  Crt: 1.5 mg/dL  Hgb: 15.2 g/dL  CO2: 20 mmol/L  K: 5.9 mmol/L  A1c: 7.1%    Assessment:       1. Stage 3b chronic kidney disease    2. Dysautonomia    3. Hypertension, unspecified type    4. Secondary hyperparathyroidism    5. Diarrhea, unspecified type    6. Anemia, unspecified type        Plan:     CKD stage 3B c eGFR 43-50 mL/min- clinically 2/2 age-related nephron loss, HTN, previous NSAID use for gout, volume depletion episodes c high ostomy output. Progressive, likely from frequent AKIs 2/2 volume depletion episodes.  No longer using NSAIDs;  At risk for progression given frequent AKIs. On PPI.     Has been getting 1L NS weekly.  Uroscopy 8/1/22: a few granular casts.    UPCR No significant proteinuria. Lisinopril previously d/c to help  decrease risk of frequent AKIs.  Needs repeat.   Acid-base WNL    Renal osteodystrophy PTH mildly elevated. Ca slightly low. On ergocalciferol.   Anemia At goal for CKD.    DM At goal for CKD.   Lipid Management On statin per PCP     HTN -Still labile. Cardiology typically adjusts medications. On amiodarone 200 mg BID, carvedilol 12.5 mg (PRN if pressures is greater than 130), amlodipine 5 mg, midodrine 5 mg  - Allow for permissive HTN due to high propensity for hypotensive episodes causing AKIs  - Recommend that SBP be maintained over 110  - aldosterone slightly elevated, possibly 2/2 volume losses through ostomy. Carvedilol may suppress renin.  - Normetanephrine elevation not impressive, especially given level of CKD  - Tilt table test showed significant orthostatic hypotension and probable neurocardiogenic syncope of mixed type. Will defer management of blood pressure to cardiologist with this diagnosis.  - Will refer to neurology as well due to tilt table diagnosis of dysautonomic dysfunction  - Will scan tilt table test results into chart    Diarrhea - Okay right now    All questions patient had were answered.  Asked if further questions. None. F/u in 3 mos with labs and urine prior to next visit or sooner if needed.  ER for emergency concerns.    Summary of Plan:  1. CBC, RFP, UA, UPCR  2. Fax note to cardiologist  3. Refer to neurology  4. avoid NSAID/ bactrim/ IV contrast/ gadolinium/ aminoglycoside where possible  5. RTC in 3 mos

## 2022-11-11 ENCOUNTER — INFUSION (OUTPATIENT)
Dept: INFECTIOUS DISEASES | Facility: HOSPITAL | Age: 72
End: 2022-11-11
Attending: INTERNAL MEDICINE
Payer: MEDICARE

## 2022-11-11 VITALS
BODY MASS INDEX: 28.04 KG/M2 | SYSTOLIC BLOOD PRESSURE: 169 MMHG | DIASTOLIC BLOOD PRESSURE: 79 MMHG | TEMPERATURE: 96 F | RESPIRATION RATE: 16 BRPM | OXYGEN SATURATION: 98 % | WEIGHT: 218.38 LBS | HEART RATE: 64 BPM

## 2022-11-11 DIAGNOSIS — E86.0 DEHYDRATION: Primary | ICD-10-CM

## 2022-11-11 DIAGNOSIS — Z93.3 COLOSTOMY PRESENT: ICD-10-CM

## 2022-11-11 DIAGNOSIS — E11.22 TYPE 2 DIABETES MELLITUS WITH STAGE 3B CHRONIC KIDNEY DISEASE, WITHOUT LONG-TERM CURRENT USE OF INSULIN: ICD-10-CM

## 2022-11-11 DIAGNOSIS — N18.32 TYPE 2 DIABETES MELLITUS WITH STAGE 3B CHRONIC KIDNEY DISEASE, WITHOUT LONG-TERM CURRENT USE OF INSULIN: ICD-10-CM

## 2022-11-11 PROCEDURE — 25000003 PHARM REV CODE 250: Performed by: NURSE PRACTITIONER

## 2022-11-11 PROCEDURE — 96360 HYDRATION IV INFUSION INIT: CPT

## 2022-11-11 RX ORDER — HEPARIN 100 UNIT/ML
500 SYRINGE INTRAVENOUS
Status: CANCELLED | OUTPATIENT
Start: 2022-11-18

## 2022-11-11 RX ORDER — HEPARIN 100 UNIT/ML
500 SYRINGE INTRAVENOUS
Status: DISCONTINUED | OUTPATIENT
Start: 2022-11-11 | End: 2022-11-11 | Stop reason: HOSPADM

## 2022-11-11 RX ORDER — SODIUM CHLORIDE 0.9 % (FLUSH) 0.9 %
10 SYRINGE (ML) INJECTION
Status: CANCELLED | OUTPATIENT
Start: 2022-11-18

## 2022-11-11 RX ORDER — SODIUM CHLORIDE 0.9 % (FLUSH) 0.9 %
10 SYRINGE (ML) INJECTION
Status: DISCONTINUED | OUTPATIENT
Start: 2022-11-11 | End: 2022-11-11 | Stop reason: HOSPADM

## 2022-11-11 RX ADMIN — SODIUM CHLORIDE 1000 ML: 0.9 INJECTION, SOLUTION INTRAVENOUS at 08:11

## 2022-11-11 NOTE — PROGRESS NOTES
Pt received 1 liter NS x 1hr, pt tolerated well & left in NAD, next appt made

## 2022-11-18 ENCOUNTER — INFUSION (OUTPATIENT)
Dept: INFECTIOUS DISEASES | Facility: HOSPITAL | Age: 72
End: 2022-11-18
Attending: INTERNAL MEDICINE
Payer: MEDICARE

## 2022-11-18 VITALS
BODY MASS INDEX: 27.92 KG/M2 | WEIGHT: 217.5 LBS | DIASTOLIC BLOOD PRESSURE: 76 MMHG | OXYGEN SATURATION: 99 % | RESPIRATION RATE: 16 BRPM | TEMPERATURE: 96 F | HEART RATE: 78 BPM | SYSTOLIC BLOOD PRESSURE: 184 MMHG

## 2022-11-18 DIAGNOSIS — Z93.3 COLOSTOMY PRESENT: ICD-10-CM

## 2022-11-18 DIAGNOSIS — N18.32 TYPE 2 DIABETES MELLITUS WITH STAGE 3B CHRONIC KIDNEY DISEASE, WITHOUT LONG-TERM CURRENT USE OF INSULIN: ICD-10-CM

## 2022-11-18 DIAGNOSIS — E11.22 TYPE 2 DIABETES MELLITUS WITH STAGE 3B CHRONIC KIDNEY DISEASE, WITHOUT LONG-TERM CURRENT USE OF INSULIN: ICD-10-CM

## 2022-11-18 DIAGNOSIS — E86.0 DEHYDRATION: Primary | ICD-10-CM

## 2022-11-18 PROCEDURE — 25000003 PHARM REV CODE 250: Performed by: NURSE PRACTITIONER

## 2022-11-18 PROCEDURE — 96360 HYDRATION IV INFUSION INIT: CPT

## 2022-11-18 RX ORDER — SODIUM CHLORIDE 0.9 % (FLUSH) 0.9 %
10 SYRINGE (ML) INJECTION
Status: CANCELLED | OUTPATIENT
Start: 2022-11-25

## 2022-11-18 RX ORDER — HEPARIN 100 UNIT/ML
500 SYRINGE INTRAVENOUS
Status: CANCELLED | OUTPATIENT
Start: 2022-11-25

## 2022-11-18 RX ADMIN — SODIUM CHLORIDE 1000 ML: 0.9 INJECTION, SOLUTION INTRAVENOUS at 08:11

## 2022-11-18 NOTE — PROGRESS NOTES
Pt received 1 liter NS x 1hr, pt tolerated well & left in NAD, next appt made

## 2022-11-23 DIAGNOSIS — N17.9 AKI (ACUTE KIDNEY INJURY): Primary | ICD-10-CM

## 2022-11-23 NOTE — PROGRESS NOTES
Discussed results of RFP with patient over the phone. Cr increased to 3.0. He reports episodes of hypotension as low as 60-70s SBP. Florinef started yesterday per Cardiology. Denies syncope, diarrhea, decreased PO intake. Continue adequate hydration. May take midodrine 10mg TID. Repeat BMP in 1 week. He is to follow up with cardiology as well.

## 2022-11-25 ENCOUNTER — TELEPHONE (OUTPATIENT)
Dept: PRIMARY CARE CLINIC | Facility: CLINIC | Age: 72
End: 2022-11-25
Payer: MEDICARE

## 2022-11-25 ENCOUNTER — INFUSION (OUTPATIENT)
Dept: INFECTIOUS DISEASES | Facility: HOSPITAL | Age: 72
End: 2022-11-25
Attending: INTERNAL MEDICINE
Payer: MEDICARE

## 2022-11-25 VITALS
WEIGHT: 218.25 LBS | TEMPERATURE: 97 F | SYSTOLIC BLOOD PRESSURE: 172 MMHG | DIASTOLIC BLOOD PRESSURE: 82 MMHG | HEART RATE: 69 BPM | OXYGEN SATURATION: 99 % | BODY MASS INDEX: 28.02 KG/M2

## 2022-11-25 DIAGNOSIS — N18.32 TYPE 2 DIABETES MELLITUS WITH STAGE 3B CHRONIC KIDNEY DISEASE, WITHOUT LONG-TERM CURRENT USE OF INSULIN: ICD-10-CM

## 2022-11-25 DIAGNOSIS — R80.9 MICROALBUMINURIA: ICD-10-CM

## 2022-11-25 DIAGNOSIS — E86.0 DEHYDRATION: Primary | ICD-10-CM

## 2022-11-25 DIAGNOSIS — E11.22 TYPE 2 DIABETES MELLITUS WITH STAGE 3B CHRONIC KIDNEY DISEASE, WITHOUT LONG-TERM CURRENT USE OF INSULIN: ICD-10-CM

## 2022-11-25 DIAGNOSIS — E11.22 TYPE 2 DIABETES MELLITUS WITH STAGE 3B CHRONIC KIDNEY DISEASE, WITHOUT LONG-TERM CURRENT USE OF INSULIN: Primary | ICD-10-CM

## 2022-11-25 DIAGNOSIS — N18.32 TYPE 2 DIABETES MELLITUS WITH STAGE 3B CHRONIC KIDNEY DISEASE, WITHOUT LONG-TERM CURRENT USE OF INSULIN: Primary | ICD-10-CM

## 2022-11-25 DIAGNOSIS — Z93.3 COLOSTOMY PRESENT: ICD-10-CM

## 2022-11-25 PROCEDURE — 96360 HYDRATION IV INFUSION INIT: CPT

## 2022-11-25 PROCEDURE — 25000003 PHARM REV CODE 250: Performed by: NURSE PRACTITIONER

## 2022-11-25 RX ORDER — LISINOPRIL 2.5 MG/1
2.5 TABLET ORAL DAILY
Qty: 30 TABLET | Refills: 3 | Status: SHIPPED | OUTPATIENT
Start: 2022-11-25 | End: 2022-12-06

## 2022-11-25 RX ORDER — SODIUM CHLORIDE 0.9 % (FLUSH) 0.9 %
10 SYRINGE (ML) INJECTION
Status: CANCELLED | OUTPATIENT
Start: 2022-12-02

## 2022-11-25 RX ORDER — HEPARIN 100 UNIT/ML
500 SYRINGE INTRAVENOUS
Status: CANCELLED | OUTPATIENT
Start: 2022-12-02

## 2022-11-25 RX ADMIN — SODIUM CHLORIDE 1000 ML: 0.9 INJECTION, SOLUTION INTRAVENOUS at 08:11

## 2022-11-25 NOTE — TELEPHONE ENCOUNTER
Called patient and notified him of results. Patient said that he had to speak to the on-call MD in regards to Bp being low. Patient said that he would prefer if you can start him on something now versus waiting to the f/u appointment. He said today his pressure was 120/88 which is good he said.

## 2022-11-25 NOTE — PROGRESS NOTES
Pt received 1 liter NS x 1hr, pt tolerated well & left in NAD, next appt made

## 2022-11-25 NOTE — TELEPHONE ENCOUNTER
Please call the patient regarding his results, He has slight microalbumin in urine he needs ACE inhibitor but pt blood pressure very low     In regards to patient results what would be the next step? That way I can call and inform the patient.

## 2022-11-25 NOTE — TELEPHONE ENCOUNTER
When his BP bettter we can start ACE inhibitor in the mean time just try keep DM and chol under controlled and keep appt f/u with me in office as scheduled

## 2022-11-28 NOTE — PROGRESS NOTES
CC: elevated PSA with suspected prostate cancer    Jarrett Charlton Jr. is a 72 y.o. man who is here for Follow-up (6 month follow up)    Jarrett Charlton Jr. is a 70 y.o. man who is here for the evaluation of elevated PSA.  He had Lupron injection treatment for suspected prostate cancer with rising PSA      a history of elevated PSA, negative TUR biopsy in January. History of APR for crohn's disease.  MRI- 15 ccs. PI-RADS 4, 1 cm lesion, right base PZ. Negative extra prostatic extension,NVB,SV nodes.     Hx of no rectum as well as concerning PIRADS lesion on MRI.   Had TURP for biopsying suspicious area      Date: 01/23/2019  Procedure(s) Performed:   TURP  Findings:   Open bladder neck some regrowth of adenoma and suspicious area in right mid prostate   Right side of prostate resected and sent for pathology  SPECIMEN  1) Right prostate chips.  FINAL PATHOLOGIC DIAGNOSIS  Right prostate gland, transurethral resection:  - Benign prostatic tissue with nodular stromal hyperplasia  - Negative for malignancy     Since TURP, he noted that he can void better with better urine flow.  Blood in urine resolved.  However, he reports Occasional ZEB but it got all better.  No more ZEB reported.  He is here with another MRI of prostate following TURP because of still rising PSA up to 6.3 from 4.1.     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  He was not interested in SUDS.  S/p colon surgery and his anus is closed.  Therefore dong TRUS bx of prostate is not feasible.  After discussion, we decided to try finasteride to see whether it will lower his PSA.   He has been on finasteride for the past couple of years. Following TURP in 1/2019, we decided to stop taking finasteride.     When he was considered to undergo TURP, we could not do his surgery because he was not able to stop ASA or Plavix due to fresh vascular stent.  He got a clearance  Subjective   Tim Govea is a 45 y.o. female who presents today for evaluation of multiple issues. Patient was referred due to ventral hernia and abdominal pain. But has also been seen in clinic previously for reflux as well as some GI bleeding and was to have colonoscopy and EGD but never followed up for this. On discussion today the patient states for at least the past several months she has been having a burning abdominal pain that seems to be in the umbilical and supraumbilical area. She denies any relation of the pain to eating but does state activity seems to make this worse. She had an episode of fairly significant pain which brought her to the ER where she had CT scan that showed ventral hernia as well as an umbilical hernia. Incidentally she was also noted to have a small right inguinal hernia and a small sliding hiatal hernia as well. Patient denies any changes in bowel movements. Has not noticed any blood per rectum. She does have reflux but treats this with Nexium which she states gives fairly good control though she still has some breakthrough symptoms. No nausea or emesis. She was referred for evaluation of her hernias and abdominal pain.     Past Medical History:   Diagnosis Date    Anxiety     Depression     E-coli UTI 11-1-14    Incontinence     Snores     Urinary tract infection     Wears glasses     READING       Past Surgical History:   Procedure Laterality Date    BLADDER SURGERY  11/18/2015    stent/anterior-posterior repair/vaginal mesh, Dr. Juan Alegria stent removed patient states she had part of her bladder and bowel   [facility] with Dr. Palmira Logan  11/18/15    cystoscopy and left ureteral catheter placement; done due to mesh eroded into bladder;Ruben Haq M.D., [address] [place] [address]  2008    [facility]    TUBAL LIGATION that he can hold off plavix and ASA prior to TURP.  Hx of ulcerative colitis, had colon surgery back in 1985 and his anus was closed.  Has a neuropathy on the right leg.  Hx of sciatic nerve on both sides and received injection on the back.  Hx of diabetes diagnosed 2 years ago.  No family hx of prostate cancer.  He is a .  Denies flank pain, dysuira, hematuria .       MRI of prostate 8/20/19  Previous biopsy: Negative on 01/23/2019  PSA: 6.3 ng/mL (08/16/2019)  Prior therapy: TURP  Prostate: 3.6 x 3.5 x 3.0 cm corresponding to a computed volume of 15.2 cc.  Peripheral zone: Focal abnormalities with imaging features concerning for prostate cancer.  Lesion (MARYCARMEN) #1  Location: Side: right; Region: base; Zone: posterior peripheral zone laterally  Greatest dimension: 1.0 cm  T2-WI: T2 SI: circumscribed, homogeneous moderate hypointensity--score 4 or 5  DWI/ADC: DWI: Focal markedly hypointense on ADC and markedly hyperintense on high b-value DWI--score 4 or 5  DCE: Positive  Extraprostatic extension: No gross extraprostatic extension noting postsurgical changes limits evaluation.  PI-RADS assessment category: 4    Transitional zone: Mostly surgically resected.  Neurovascular bundle: Normal.  Seminal vesicles:  SV invasion: None  Adjacent Organ Involvement: No focal bladder wall thickening.  No rectal involvement.  Lymphadenopathy: None     On 11/22/19, pt underwent perineal biopsy by IR for CT guided needle bx for the right posterolateral prostate lesion;  Elevated prostate specific antigen (PSA)  Pathology:  BIOPSY OF PROSTATE BED:  FIBROFATTY TISSUE WITH NO NEOPLASIA IDENTIFIED     Pt had his PSA repeated and is now elevated to 7.9.  He was very anxious about possible prostate cancer and would like to to something about it.  So we have started ADT.  He is here with PSA follow up.  Denies any problems. No hot flashes, decreased energy level.    Past Medical History:   Diagnosis Date    Basal cell carcinoma      BPH (benign prostatic hyperplasia)     s/p TURP    Carotid stenosis     Chronic kidney disease     Claudication     Coronary artery disease     DDD (degenerative disc disease) 10/21/2013    Diabetes mellitus with renal complications     Disc disease, degenerative, cervical     Encounter for blood transfusion     GERD (gastroesophageal reflux disease)     Gout, chronic     History of ulcerative colitis     s/p colectomy and ileostomy    HLD (hyperlipidemia)     HTN (hypertension)     Ileostomy in place 1982    RBBB     Squamous cell carcinoma 03/08/2018    Left superior helix near insertion    Squamous cell carcinoma 04/12/2018    Left forearm x 5    Ventricular tachycardia      Past Surgical History:   Procedure Laterality Date    cardiac stents      CATARACT EXTRACTION Bilateral     CERVICAL FUSION      colectomy and ileostomy  1985    EXCISION OF PAROTID GLAND Left 12/18/2020    Procedure: EXCISION, PAROTID GLAND;  Surgeon: Michael Pinzon MD;  Location: [facility] OR [address] [place] [address];  Service: ENT;  Laterality: Left;    INSERTION OF IMPLANTABLE LOOP RECORDER  06/07/2021    INSERTION OF IMPLANTABLE LOOP RECORDER Left 6/7/2021    Procedure: INSERTION, IMPLANTABLE LOOP RECORDER;  Surgeon: Romario Dao MD;  Location: [facility] CATH LAB;  Service: Cardiology;  Laterality: Left;    LEFT HEART CATHETERIZATION Right 4/15/2021    Procedure: CATHETERIZATION, HEART, LEFT;  Surgeon: Marcio Jones MD;  Location: [facility] CATH LAB;  Service: Cardiology;  Laterality: Right;    LYSIS OF ADHESIONS N/A 11/9/2020    Procedure: LYSIS, ADHESIONS,  ERAS low;  Surgeon: CED Haley MD;  [facility];  Service: Colon and Rectal;  Laterality: N/A;    POUCHOSCOPY N/A 4/6/2022    Procedure: ENDOSCOPY, POUCH, SMALL INTESTINE, DIAGNOSTIC;  Surgeon: Dieter Juarez MD;  Location: [facility] ENDO ([address] [place] [address]);  Service: Endoscopy;  Laterality: N/A;    REPAIR, HERNIA, PARASTOMAL N/A 11/9/2020    Procedure: REPAIR, HERNIA, PARASTOMAL;   Ears, Nose, Throat: hearing grossly normal bilaterally  Neck: neck supple and non tender without mass  Lungs: clear to auscultation without wheezes or rales   Heart: S1S2, no mumurs, RRR  Abdomen: Soft, nondistended, tender to palpation around the umbilicus areas of known hernias but otherwise nontender, bowel sounds present. Patient does have well-healed surgical scars consistent with provided history. Extremity: negative  Neuro: CN II-XII grossly intact      Assessment     3  69-year-old female with abdominal pain in mid abdomen with CT showing umbilical as well as ventral hernia. Based on the patient's symptoms I think that her hernias are the cause of her current abdominal pains. 2.  History of reflux  3. Need for screening colonoscopy  4. Daily smokerdoes not wish to quit at this time      Plan     1. Based on the patient's continued reflux with some breakthrough symptoms despite medications I am going to proceed with EGD initially to rule out any gastric pathology which may be contributing to pain. As the patient is due for screening colonoscopy will also plan for colonoscopy at same time. Risk of both procedures including bleeding, infection, perforation, need for further surgery and anesthesia risk were discussed and consent is obtained. Surgeon: CED Haley MD;  Location: [facility] OR 2ND FLR;  Service: Colon and Rectal;  Laterality: N/A;    TRANSURETHRAL RESECTION OF PROSTATE (TURP) WITHOUT USE OF LASER N/A 1/23/2019    Procedure: TURP, WITHOUT USING LASER BIPOLAR;  Surgeon: Catarino Mota MD;  Location: [facility] OR 1ST FLR;  Service: Urology;  Laterality: N/A;  1.5 HOURS     Social History     Tobacco Use    Smoking status: Never    Smokeless tobacco: Never   Substance Use Topics    Alcohol use: No    Drug use: No     Family History   Problem Relation Age of Onset    Cancer Father     Diabetes Father     Dementia Mother     Melanoma Neg Hx     Hypertension Neg Hx     Arthritis Neg Hx      Allergy:  Review of patient's allergies indicates:   Allergen Reactions    Crestor [rosuvastatin]     Lipitor [atorvastatin]      Outpatient Encounter Medications as of 11/29/2022   Medication Sig Dispense Refill    ACCU-CHEK PAUL CONTROL SOLN Soln 1 drop by NOT APPLICABLE route once daily.      ACCU-CHEK SOFTCLIX LANCETS Misc 1 lancet by NOT APPLICABLE route once daily.      allopurinoL (ZYLOPRIM) 300 MG tablet Take 1.5 tablets (450 mg total) by mouth once daily. 135 tablet 3    ALPRAZolam (XANAX) 0.5 MG tablet Take 0.5 mg by mouth nightly as needed for Anxiety.      amiodarone (PACERONE) 200 MG Tab Take 1 tablet (200 mg total) by mouth 2 (two) times daily.      amLODIPine (NORVASC) 5 MG tablet Take 5 mg by mouth once daily.      aspirin (ECOTRIN) 81 MG EC tablet Take 81 mg by mouth once daily.      BD ALCOHOL SWABS PadM       blood sugar diagnostic (ONE TOUCH ULTRA TEST) Str USE ONE STRIP TO CHECK GLUCOSE EVERY  each 11    brimonidine 0.2% (ALPHAGAN) 0.2 % Drop INSTILL 1 DROP INTO EACH EYE TWICE DAILY 20 mL 0    calcium citrate-vitamin D3 (CITRACAL + D MAXIMUM) 315 mg-6.25 mcg (250 unit) Tab Take 1 tablet by mouth once daily. 120 tablet 3    carvediloL (COREG) 12.5 MG tablet Take 12.5 mg by mouth once daily.      clopidogrel (PLAVIX) 75 mg tablet Take  2.  We will begin planning for ventral hernia repair as well as I do think that this is the most likely cause of her current symptoms. After discussion the patient would like to proceed with surgery and we have discussed robotic assisted laparoscopic ventral hernia repair with mesh. Risks of the procedure including bleeding, infection, scarring, pain, damage to surrounding structures, need for additional surgery, hernia recurrence, mesh complications including mesh infection, and anesthesia risk were discussed and consent is obtained. We will tentatively plan the patient for surgery after we have completed endoscopy and if there is any acute pathology that needs to be addressed we will just course at that time.     Electronically signed by Farooq Dhaliwal DO on 2/12/2021 at 9:14 AM      (Please note that portions of this note were completed with a voice recognition program.  Efforts were made to edit the dictations but occasionally words are mis-transcribed.) 75 mg by mouth once daily.      droxidopa 100 mg Cap Take 2 capsules by mouth once daily. 400mg daily      FIBER, CALCIUM POLYCARBOPHIL, 625 mg tablet Take 3 tablets by mouth before dinner.      fludrocortisone (FLORINEF) 0.1 mg Tab       glimepiride (AMARYL) 4 MG tablet Take 4 mg by mouth 2 (two) times daily before meals.      lisinopriL (PRINIVIL,ZESTRIL) 2.5 MG tablet Take 1 tablet (2.5 mg total) by mouth once daily. 30 tablet 3    magnesium oxide (MAG-OX) 400 mg (241.3 mg magnesium) tablet Take 400 mg by mouth once daily.      midodrine (PROAMATINE) 5 MG Tab Take 5 mg by mouth 2 (two) times daily as needed.      pantoprazole (PROTONIX) 40 MG tablet Take 40 mg by mouth once daily.      pravastatin (PRAVACHOL) 40 MG tablet Take 2 tablets (80 mg total) by mouth once daily. 80mg daily 180 tablet 3    gabapentin (NEURONTIN) 600 MG tablet Take 1 tablet (600 mg total) by mouth 2 (two) times daily. (Patient not taking: Reported on 11/29/2022) 180 tablet 3    HYDROcodone-acetaminophen (NORCO) 5-325 mg per tablet Take 1 tablet by mouth every 12 (twelve) hours as needed for Pain. (Patient not taking: Reported on 11/29/2022) 10 tablet 0     Facility-Administered Encounter Medications as of 11/29/2022   Medication Dose Route Frequency Provider Last Rate Last Admin    leuprolide (6 month) injection 45 mg  45 mg Intramuscular Q6 Months Catarino Mota MD        sodium chloride 0.9% in 1,000 mL infusion   Intravenous Continuous Paper Order         Review of Systems   ROS  Physical Exam     Vitals:    11/29/22 0934   BP: (!) 141/87   Pulse: 75       Physical Exam  Genitalia:  Scrotum: no rash or lesion  Normal symmetric epididymis without masses  Normal vas palpated  Normal size, symmetric testicles with no masses   Normal urethral meatus with no discharge  Normal circumcised penis with no lesion   Rectal:  Normal perineum and anus upon inspection.  Normal tone, no masses or tenderness;     LABS:  Lab Results   Component Value  Date    PSA 0.18 11/19/2020    PSA 7.9 (H) 04/20/2020    PSA 2.1 09/15/2014    PSA 2.16 05/13/2013    PSA 1.2 03/19/2012    PSA 1.0 02/19/2010    PSA 1.4 02/17/2009    PSA 1.1 04/30/2007    PSA 1.0 05/12/2006    PSA 1.0 04/22/2005    PSADIAG 0.08 11/22/2022    PSADIAG 0.03 03/04/2022    PSADIAG 0.03 12/16/2021    PSADIAG 0.02 10/29/2021    PSADIAG 0.02 09/14/2021    PSADIAG 0.04 05/25/2021    PSADIAG 0.10 02/11/2021    PSADIAG 0.54 08/11/2020    PSADIAG 6.3 (H) 08/16/2019    PSADIAG 4.1 (H) 11/21/2018     Results for orders placed or performed in visit on 11/22/22   Prostate Specific Antigen, Diagnostic   Result Value Ref Range    PSA Diagnostic 0.08 0.00 - 4.00 ng/mL   Results for orders placed or performed in visit on 03/04/22   PROSTATE SPECIFIC ANTIGEN, DIAGNOSTIC   Result Value Ref Range    PSA Diagnostic 0.03 0.00 - 4.00 ng/mL   Results for orders placed or performed in visit on 12/16/21   PROSTATE SPECIFIC ANTIGEN, DIAGNOSTIC   Result Value Ref Range    PSA Diagnostic 0.03 0.00 - 4.00 ng/mL     Lab Results   Component Value Date    CREATININE 3.0 (H) 11/22/2022    CREATININE 2.2 (H) 09/20/2022    CREATININE 2.3 (H) 08/01/2022     Results for orders placed or performed in visit on 11/22/22   Testosterone   Result Value Ref Range    Testosterone, Total 43 (L) 304 - 1227 ng/dL   Results for orders placed or performed in visit on 03/04/22   Testosterone   Result Value Ref Range    Testosterone, Total 21 (L) 304 - 1227 ng/dL   Results for orders placed or performed in visit on 12/16/21   Testosterone   Result Value Ref Range    Testosterone, Total 15 (L) 304 - 1227 ng/dL     Urine Culture, Routine   Date Value Ref Range Status   02/20/2019 No significant growth  Final     Hemoglobin A1C   Date Value Ref Range Status   07/27/2022 6.8 (H) 4.0 - 5.6 % Final     Comment:     ADA Screening Guidelines:  5.7-6.4%  Consistent with prediabetes  >or=6.5%  Consistent with diabetes    High levels of fetal hemoglobin interfere  "with the HbA1C  assay. Heterozygous hemoglobin variants (HbS, HgC, etc)do  not significantly interfere with this assay.   However, presence of multiple variants may affect accuracy.     03/04/2022 7.0 (H) 4.0 - 5.6 % Final     Comment:     ADA Screening Guidelines:  5.7-6.4%  Consistent with prediabetes  >or=6.5%  Consistent with diabetes    High levels of fetal hemoglobin interfere with the HbA1C  assay. Heterozygous hemoglobin variants (HbS, HgC, etc)do  not significantly interfere with this assay.   However, presence of multiple variants may affect accuracy.         Radiology:  MRI of prostate 8/20/19  Previous biopsy: Negative on 01/23/2019  PSA: 6.3 ng/mL (08/16/2019)  Prior therapy: TURP  Prostate: 3.6 x 3.5 x 3.0 cm corresponding to a computed volume of 15.2 cc.  Peripheral zone: Focal abnormalities with imaging features concerning for prostate cancer.  Lesion (MARYCARMEN) #1  Location: Side: right; Region: base; Zone: posterior peripheral zone laterally  Greatest dimension: 1.0 cm  T2-WI: T2 SI: circumscribed, homogeneous moderate hypointensity--score 4 or 5  DWI/ADC: DWI: Focal markedly hypointense on ADC and markedly hyperintense on high b-value DWI--score 4 or 5  DCE: Positive  Extraprostatic extension: No gross extraprostatic extension noting postsurgical changes limits evaluation.  PI-RADS assessment category: 4    Transitional zone: Mostly surgically resected.  Neurovascular bundle: Normal.  Seminal vesicles:  SV invasion: None  Adjacent Organ Involvement: No focal bladder wall thickening.  No rectal involvement.  Lymphadenopathy: None  Assessment and Plan:  Jarrett was seen today for follow-up.    Diagnoses and all orders for this visit:    Prostate cancer  -     Prostate Specific Antigen, Diagnostic; Future    S/P TURP    hx of "suspected prostate cancer" with abnormal MRI of prostate with rising PSA up to 7.9 in 4/20/20.  Never proved that he has a prostate cancer with tissue diagnosis despite TURP.  Unable " to perform prostate biopsy because hs anus is closed off.    his PSA is markedly down since Lupron injection   He received two doses of Lupron injection so far.  Last injection was given 12/20/20.    His PSA remains undetectable and his testosterone level remains at castration level.  So no Lupron injection today.  Will continue to follow up with serial PSA.    Will follow up with serial PSA in Oct. 2023.  Continue citracal with Vit D supplement.    I spent 25 minutes with the patient of which more than half was spent in direct consultation with the patient in regards to our treatment and plan.      Follow-up:  Follow up in about 10 months (around 9/29/2023) for PSA.

## 2022-11-29 ENCOUNTER — OFFICE VISIT (OUTPATIENT)
Dept: UROLOGY | Facility: CLINIC | Age: 72
End: 2022-11-29
Payer: MEDICARE

## 2022-11-29 VITALS
DIASTOLIC BLOOD PRESSURE: 87 MMHG | SYSTOLIC BLOOD PRESSURE: 141 MMHG | BODY MASS INDEX: 27.93 KG/M2 | HEART RATE: 75 BPM | HEIGHT: 74 IN | WEIGHT: 217.63 LBS

## 2022-11-29 DIAGNOSIS — Z90.79 S/P TURP: ICD-10-CM

## 2022-11-29 DIAGNOSIS — C61 PROSTATE CANCER: Primary | ICD-10-CM

## 2022-11-29 PROCEDURE — 3066F PR DOCUMENTATION OF TREATMENT FOR NEPHROPATHY: ICD-10-PCS | Mod: CPTII,S$GLB,, | Performed by: UROLOGY

## 2022-11-29 PROCEDURE — 3288F FALL RISK ASSESSMENT DOCD: CPT | Mod: CPTII,S$GLB,, | Performed by: UROLOGY

## 2022-11-29 PROCEDURE — 99214 PR OFFICE/OUTPT VISIT, EST, LEVL IV, 30-39 MIN: ICD-10-PCS | Mod: S$GLB,,, | Performed by: UROLOGY

## 2022-11-29 PROCEDURE — 3079F PR MOST RECENT DIASTOLIC BLOOD PRESSURE 80-89 MM HG: ICD-10-PCS | Mod: CPTII,S$GLB,, | Performed by: UROLOGY

## 2022-11-29 PROCEDURE — 4010F PR ACE/ARB THEARPY RXD/TAKEN: ICD-10-PCS | Mod: CPTII,S$GLB,, | Performed by: UROLOGY

## 2022-11-29 PROCEDURE — 3077F PR MOST RECENT SYSTOLIC BLOOD PRESSURE >= 140 MM HG: ICD-10-PCS | Mod: CPTII,S$GLB,, | Performed by: UROLOGY

## 2022-11-29 PROCEDURE — 1126F AMNT PAIN NOTED NONE PRSNT: CPT | Mod: CPTII,S$GLB,, | Performed by: UROLOGY

## 2022-11-29 PROCEDURE — 1159F PR MEDICATION LIST DOCUMENTED IN MEDICAL RECORD: ICD-10-PCS | Mod: CPTII,S$GLB,, | Performed by: UROLOGY

## 2022-11-29 PROCEDURE — 3066F NEPHROPATHY DOC TX: CPT | Mod: CPTII,S$GLB,, | Performed by: UROLOGY

## 2022-11-29 PROCEDURE — 99999 PR PBB SHADOW E&M-EST. PATIENT-LVL IV: CPT | Mod: PBBFAC,,, | Performed by: UROLOGY

## 2022-11-29 PROCEDURE — 1101F PR PT FALLS ASSESS DOC 0-1 FALLS W/OUT INJ PAST YR: ICD-10-PCS | Mod: CPTII,S$GLB,, | Performed by: UROLOGY

## 2022-11-29 PROCEDURE — 99999 PR PBB SHADOW E&M-EST. PATIENT-LVL IV: ICD-10-PCS | Mod: PBBFAC,,, | Performed by: UROLOGY

## 2022-11-29 PROCEDURE — 99499 UNLISTED E&M SERVICE: CPT | Mod: HCNC,S$GLB,, | Performed by: UROLOGY

## 2022-11-29 PROCEDURE — 3008F PR BODY MASS INDEX (BMI) DOCUMENTED: ICD-10-PCS | Mod: CPTII,S$GLB,, | Performed by: UROLOGY

## 2022-11-29 PROCEDURE — 3288F PR FALLS RISK ASSESSMENT DOCUMENTED: ICD-10-PCS | Mod: CPTII,S$GLB,, | Performed by: UROLOGY

## 2022-11-29 PROCEDURE — 4010F ACE/ARB THERAPY RXD/TAKEN: CPT | Mod: CPTII,S$GLB,, | Performed by: UROLOGY

## 2022-11-29 PROCEDURE — 3077F SYST BP >= 140 MM HG: CPT | Mod: CPTII,S$GLB,, | Performed by: UROLOGY

## 2022-11-29 PROCEDURE — 1101F PT FALLS ASSESS-DOCD LE1/YR: CPT | Mod: CPTII,S$GLB,, | Performed by: UROLOGY

## 2022-11-29 PROCEDURE — 3044F HG A1C LEVEL LT 7.0%: CPT | Mod: CPTII,S$GLB,, | Performed by: UROLOGY

## 2022-11-29 PROCEDURE — 3008F BODY MASS INDEX DOCD: CPT | Mod: CPTII,S$GLB,, | Performed by: UROLOGY

## 2022-11-29 PROCEDURE — 99214 OFFICE O/P EST MOD 30 MIN: CPT | Mod: S$GLB,,, | Performed by: UROLOGY

## 2022-11-29 PROCEDURE — 3044F PR MOST RECENT HEMOGLOBIN A1C LEVEL <7.0%: ICD-10-PCS | Mod: CPTII,S$GLB,, | Performed by: UROLOGY

## 2022-11-29 PROCEDURE — 3060F PR POS MICROALBUMINURIA RESULT DOCUMENTED/REVIEW: ICD-10-PCS | Mod: CPTII,S$GLB,, | Performed by: UROLOGY

## 2022-11-29 PROCEDURE — 1159F MED LIST DOCD IN RCRD: CPT | Mod: CPTII,S$GLB,, | Performed by: UROLOGY

## 2022-11-29 PROCEDURE — 1126F PR PAIN SEVERITY QUANTIFIED, NO PAIN PRESENT: ICD-10-PCS | Mod: CPTII,S$GLB,, | Performed by: UROLOGY

## 2022-11-29 PROCEDURE — 3060F POS MICROALBUMINURIA REV: CPT | Mod: CPTII,S$GLB,, | Performed by: UROLOGY

## 2022-11-29 PROCEDURE — 3079F DIAST BP 80-89 MM HG: CPT | Mod: CPTII,S$GLB,, | Performed by: UROLOGY

## 2022-11-29 NOTE — PATIENT INSTRUCTIONS
Lab Results   Component Value Date    PSA 0.18 11/19/2020    PSA 7.9 (H) 04/20/2020    PSA 2.1 09/15/2014    PSADIAG 0.08 11/22/2022    PSADIAG 0.03 03/04/2022    PSADIAG 0.03 12/16/2021

## 2022-11-30 ENCOUNTER — TELEPHONE (OUTPATIENT)
Dept: NEUROLOGY | Facility: CLINIC | Age: 72
End: 2022-11-30
Payer: MEDICARE

## 2022-11-30 NOTE — TELEPHONE ENCOUNTER
Pt has a lot going on medically at this time, has GRADY and has undergone recent changes to bp med regimen. B/p, when hydrated and medicated is stable at this time. Pt wishes to see new MD, Dr. Singh, in February for dysautonomia tx. Will place on wait list and anticipate first pt on Dr Singh's list.

## 2022-11-30 NOTE — TELEPHONE ENCOUNTER
----- Message from Tyron Hodgson MA sent at 11/21/2022  4:12 PM CST -----  Regarding: RE: pt appt  Contact: pt @ [phone]  Okay sounds good. Let me know if you need any more help. Dr. Gates did mention that there is a neurologist joining our department at [facility] sometime in February who can treat dysautonomia. Not sure if that helps or if that is too far away.     Tyron  ----- Message -----  From: Chanda Muhammad RN  Sent: 11/21/2022   3:57 PM CST  To: Tyron Hodgson MA  Subject: RE: pt appt                                      That is a complicated question with a layered response :)    For now, I am not going to do that because this man has a lot going on medically and all of his care is at [facility].     I am pretty sure Dr. Yoo is an Electrophysiologist and he may already be hooked up with one. Let me talk with the pt first and go from there.       ----- Message -----  From: Tyron Hodgson MA  Sent: 11/21/2022   3:52 PM CST  To: Chanda Muhammad RN  Subject: RE: pt appt                                      Good, sorry for the confusion. Is reaching out to this other provider's staff at hospitals something you can do?  ----- Message -----  From: Chanda Muhammad RN  Sent: 11/21/2022   3:47 PM CST  To: Tyron Hodgson MA  Subject: RE: pt appt                                      Disregarding- I see your next message  ----- Message -----  From: Tyron Hodgson MA  Sent: 11/21/2022   3:28 PM CST  To: Chanda Muhammad RN  Subject: RE: pt appt                                      Hey,    I just scheduled him w Dr. Gates for 11/29 at 8am. Can you please confirm with patient if this will work?    Tyron  ----- Message -----  From: Chanda Muhammad RN  Sent: 11/21/2022   3:04 PM CST  To: Tyron Hodgson MA  Subject: RE: pt appt                                      I wouldn't remove. I just wondered if it was a built in hold, if there was a same day change. I'd never remove a hold without asking a provider,  or support staff at the very least, especially due to the responsibilities of his additional administrative role here.     I am happy to call the pt- I have not even started that process- as I was unsure what kind of wait we were looking at.     Thanks,     Chanda  ----- Message -----  From: Tyron Hodgson MA  Sent: 11/21/2022   2:58 PM CST  To: Chanda Muhammad RN  Subject: RE: pt appt                                      Shady Armas,     Please don't remove the hold on the appointment for tomorrow morning. That slot was originally created to squeeze a pt in but she was able to do a different day which is why there is still no appointment there. Dr. Gates is pretty busy that day, and he was only fitting this pt in but he would prefer not to have a pt there if not necessary. I'll look for an appointment date/time and get back to you.    Thanks, Tyron  ----- Message -----  From: Chanda Muhammad RN  Sent: 11/18/2022   4:34 PM CST  To: [facility] Staff  Subject: FW: pt appt                                      Should I schedule first available with Dr Gates or is there something specific I need to know prior to scheduling this pt with him?    Thank you,    Chanda  ----- Message -----  From: Darius Tremayne Brown  Sent: 11/8/2022   3:45 PM CST  To: , #  Subject: pt appt                                          No Available Appointment in [facility]    Caller: Jarrett Charlton Jr.    Reason for call: To schedule from Referral     Reason appt not scheduled:No appt available in Epic       Patient's DX:Dysautonomia [G90.1]    Patient requesting call back or [facility] msg: Call Back

## 2022-12-01 DIAGNOSIS — N17.9 AKI (ACUTE KIDNEY INJURY): Primary | ICD-10-CM

## 2022-12-02 ENCOUNTER — INFUSION (OUTPATIENT)
Dept: INFECTIOUS DISEASES | Facility: HOSPITAL | Age: 72
End: 2022-12-02
Attending: INTERNAL MEDICINE
Payer: MEDICARE

## 2022-12-02 VITALS
WEIGHT: 217.5 LBS | BODY MASS INDEX: 27.92 KG/M2 | SYSTOLIC BLOOD PRESSURE: 178 MMHG | TEMPERATURE: 96 F | DIASTOLIC BLOOD PRESSURE: 82 MMHG | OXYGEN SATURATION: 96 % | HEART RATE: 72 BPM

## 2022-12-02 DIAGNOSIS — E11.22 TYPE 2 DIABETES MELLITUS WITH STAGE 3B CHRONIC KIDNEY DISEASE, WITHOUT LONG-TERM CURRENT USE OF INSULIN: ICD-10-CM

## 2022-12-02 DIAGNOSIS — N18.32 TYPE 2 DIABETES MELLITUS WITH STAGE 3B CHRONIC KIDNEY DISEASE, WITHOUT LONG-TERM CURRENT USE OF INSULIN: ICD-10-CM

## 2022-12-02 DIAGNOSIS — Z93.3 COLOSTOMY PRESENT: ICD-10-CM

## 2022-12-02 DIAGNOSIS — E86.0 DEHYDRATION: Primary | ICD-10-CM

## 2022-12-02 PROCEDURE — 25000003 PHARM REV CODE 250: Performed by: NURSE PRACTITIONER

## 2022-12-02 PROCEDURE — 96360 HYDRATION IV INFUSION INIT: CPT

## 2022-12-02 RX ORDER — HEPARIN 100 UNIT/ML
500 SYRINGE INTRAVENOUS
Status: CANCELLED | OUTPATIENT
Start: 2022-12-09

## 2022-12-02 RX ORDER — SODIUM CHLORIDE 0.9 % (FLUSH) 0.9 %
10 SYRINGE (ML) INJECTION
Status: CANCELLED | OUTPATIENT
Start: 2022-12-09

## 2022-12-02 RX ADMIN — SODIUM CHLORIDE 1000 ML: 0.9 INJECTION, SOLUTION INTRAVENOUS at 09:12

## 2022-12-02 NOTE — PROGRESS NOTES
Pt received 1 liter NS x 1hr, pt tolerated well & left in NAD, next appt made

## 2022-12-06 PROBLEM — R53.1 WEAKNESS: Status: ACTIVE | Noted: 2022-12-06

## 2022-12-06 PROBLEM — R94.31 ABNORMAL EKG: Status: ACTIVE | Noted: 2022-12-06

## 2022-12-06 PROBLEM — N18.4 STAGE 4 CHRONIC KIDNEY DISEASE: Status: ACTIVE | Noted: 2021-02-11

## 2022-12-07 ENCOUNTER — TELEPHONE (OUTPATIENT)
Dept: DIABETES | Facility: CLINIC | Age: 72
End: 2022-12-07
Payer: MEDICARE

## 2022-12-07 PROBLEM — R53.1 WEAKNESS: Status: RESOLVED | Noted: 2022-12-06 | Resolved: 2022-12-07

## 2022-12-09 ENCOUNTER — OFFICE VISIT (OUTPATIENT)
Dept: OTOLARYNGOLOGY | Facility: CLINIC | Age: 72
End: 2022-12-09
Payer: MEDICARE

## 2022-12-09 ENCOUNTER — INFUSION (OUTPATIENT)
Dept: INFECTIOUS DISEASES | Facility: HOSPITAL | Age: 72
End: 2022-12-09
Payer: MEDICARE

## 2022-12-09 ENCOUNTER — LAB VISIT (OUTPATIENT)
Dept: LAB | Facility: HOSPITAL | Age: 72
End: 2022-12-09
Payer: MEDICARE

## 2022-12-09 VITALS
RESPIRATION RATE: 18 BRPM | BODY MASS INDEX: 27.91 KG/M2 | HEART RATE: 77 BPM | OXYGEN SATURATION: 95 % | DIASTOLIC BLOOD PRESSURE: 79 MMHG | SYSTOLIC BLOOD PRESSURE: 145 MMHG | TEMPERATURE: 97 F | WEIGHT: 217.38 LBS

## 2022-12-09 VITALS — BODY MASS INDEX: 27.86 KG/M2 | WEIGHT: 217 LBS

## 2022-12-09 DIAGNOSIS — E11.22 TYPE 2 DIABETES MELLITUS WITH STAGE 3B CHRONIC KIDNEY DISEASE, WITHOUT LONG-TERM CURRENT USE OF INSULIN: ICD-10-CM

## 2022-12-09 DIAGNOSIS — K11.8 PAROTID MASS: ICD-10-CM

## 2022-12-09 DIAGNOSIS — N17.9 AKI (ACUTE KIDNEY INJURY): ICD-10-CM

## 2022-12-09 DIAGNOSIS — E86.0 DEHYDRATION: Primary | ICD-10-CM

## 2022-12-09 DIAGNOSIS — N18.32 TYPE 2 DIABETES MELLITUS WITH STAGE 3B CHRONIC KIDNEY DISEASE, WITHOUT LONG-TERM CURRENT USE OF INSULIN: ICD-10-CM

## 2022-12-09 DIAGNOSIS — Z93.3 COLOSTOMY PRESENT: ICD-10-CM

## 2022-12-09 LAB
ANION GAP SERPL CALC-SCNC: 12 MMOL/L (ref 8–16)
BUN SERPL-MCNC: 33 MG/DL (ref 8–23)
CALCIUM SERPL-MCNC: 8.3 MG/DL (ref 8.7–10.5)
CHLORIDE SERPL-SCNC: 108 MMOL/L (ref 95–110)
CO2 SERPL-SCNC: 23 MMOL/L (ref 23–29)
CREAT SERPL-MCNC: 2.8 MG/DL (ref 0.5–1.4)
EST. GFR  (NO RACE VARIABLE): 23.1 ML/MIN/1.73 M^2
GLUCOSE SERPL-MCNC: 207 MG/DL (ref 70–110)
POTASSIUM SERPL-SCNC: 4.1 MMOL/L (ref 3.5–5.1)
SODIUM SERPL-SCNC: 143 MMOL/L (ref 136–145)

## 2022-12-09 PROCEDURE — 80048 BASIC METABOLIC PNL TOTAL CA: CPT | Performed by: NURSE PRACTITIONER

## 2022-12-09 PROCEDURE — 1160F RVW MEDS BY RX/DR IN RCRD: CPT | Mod: CPTII,S$GLB,, | Performed by: OTOLARYNGOLOGY

## 2022-12-09 PROCEDURE — 3051F PR MOST RECENT HEMOGLOBIN A1C LEVEL 7.0 - < 8.0%: ICD-10-PCS | Mod: CPTII,S$GLB,, | Performed by: OTOLARYNGOLOGY

## 2022-12-09 PROCEDURE — 96360 HYDRATION IV INFUSION INIT: CPT

## 2022-12-09 PROCEDURE — 25000003 PHARM REV CODE 250: Performed by: NURSE PRACTITIONER

## 2022-12-09 PROCEDURE — 99999 PR PBB SHADOW E&M-EST. PATIENT-LVL III: ICD-10-PCS | Mod: PBBFAC,,, | Performed by: OTOLARYNGOLOGY

## 2022-12-09 PROCEDURE — 3066F NEPHROPATHY DOC TX: CPT | Mod: CPTII,S$GLB,, | Performed by: OTOLARYNGOLOGY

## 2022-12-09 PROCEDURE — 3060F POS MICROALBUMINURIA REV: CPT | Mod: CPTII,S$GLB,, | Performed by: OTOLARYNGOLOGY

## 2022-12-09 PROCEDURE — 3051F HG A1C>EQUAL 7.0%<8.0%: CPT | Mod: CPTII,S$GLB,, | Performed by: OTOLARYNGOLOGY

## 2022-12-09 PROCEDURE — 3060F PR POS MICROALBUMINURIA RESULT DOCUMENTED/REVIEW: ICD-10-PCS | Mod: CPTII,S$GLB,, | Performed by: OTOLARYNGOLOGY

## 2022-12-09 PROCEDURE — 4010F ACE/ARB THERAPY RXD/TAKEN: CPT | Mod: CPTII,S$GLB,, | Performed by: OTOLARYNGOLOGY

## 2022-12-09 PROCEDURE — 1160F PR REVIEW ALL MEDS BY PRESCRIBER/CLIN PHARMACIST DOCUMENTED: ICD-10-PCS | Mod: CPTII,S$GLB,, | Performed by: OTOLARYNGOLOGY

## 2022-12-09 PROCEDURE — 3066F PR DOCUMENTATION OF TREATMENT FOR NEPHROPATHY: ICD-10-PCS | Mod: CPTII,S$GLB,, | Performed by: OTOLARYNGOLOGY

## 2022-12-09 PROCEDURE — 99999 PR PBB SHADOW E&M-EST. PATIENT-LVL III: CPT | Mod: PBBFAC,,, | Performed by: OTOLARYNGOLOGY

## 2022-12-09 PROCEDURE — 4010F PR ACE/ARB THEARPY RXD/TAKEN: ICD-10-PCS | Mod: CPTII,S$GLB,, | Performed by: OTOLARYNGOLOGY

## 2022-12-09 PROCEDURE — 99213 PR OFFICE/OUTPT VISIT, EST, LEVL III, 20-29 MIN: ICD-10-PCS | Mod: S$GLB,,, | Performed by: OTOLARYNGOLOGY

## 2022-12-09 PROCEDURE — 36415 COLL VENOUS BLD VENIPUNCTURE: CPT | Performed by: NURSE PRACTITIONER

## 2022-12-09 PROCEDURE — 1126F AMNT PAIN NOTED NONE PRSNT: CPT | Mod: CPTII,S$GLB,, | Performed by: OTOLARYNGOLOGY

## 2022-12-09 PROCEDURE — 3008F PR BODY MASS INDEX (BMI) DOCUMENTED: ICD-10-PCS | Mod: CPTII,S$GLB,, | Performed by: OTOLARYNGOLOGY

## 2022-12-09 PROCEDURE — 1126F PR PAIN SEVERITY QUANTIFIED, NO PAIN PRESENT: ICD-10-PCS | Mod: CPTII,S$GLB,, | Performed by: OTOLARYNGOLOGY

## 2022-12-09 PROCEDURE — 1159F PR MEDICATION LIST DOCUMENTED IN MEDICAL RECORD: ICD-10-PCS | Mod: CPTII,S$GLB,, | Performed by: OTOLARYNGOLOGY

## 2022-12-09 PROCEDURE — 3008F BODY MASS INDEX DOCD: CPT | Mod: CPTII,S$GLB,, | Performed by: OTOLARYNGOLOGY

## 2022-12-09 PROCEDURE — 1159F MED LIST DOCD IN RCRD: CPT | Mod: CPTII,S$GLB,, | Performed by: OTOLARYNGOLOGY

## 2022-12-09 PROCEDURE — 99213 OFFICE O/P EST LOW 20 MIN: CPT | Mod: S$GLB,,, | Performed by: OTOLARYNGOLOGY

## 2022-12-09 RX ORDER — SODIUM CHLORIDE 0.9 % (FLUSH) 0.9 %
10 SYRINGE (ML) INJECTION
Status: CANCELLED | OUTPATIENT
Start: 2022-12-16

## 2022-12-09 RX ORDER — HEPARIN 100 UNIT/ML
500 SYRINGE INTRAVENOUS
Status: CANCELLED | OUTPATIENT
Start: 2022-12-16

## 2022-12-09 RX ADMIN — SODIUM CHLORIDE 1000 ML: 0.9 INJECTION, SOLUTION INTRAVENOUS at 09:12

## 2022-12-09 NOTE — PROGRESS NOTES
Pt received 1 liter NS x 1hr, pt tolerated well & left in NAD, next appt made

## 2022-12-12 NOTE — PROGRESS NOTES
Chief Complaint   Patient presents with    feels like lump in neck       HPI   72 y.o. male presents status post parotidectomy for a left parotid cyst in 2020.  He reports he recently noted a small nodule of the left neck.  It is tender to palpation.  A sharp pain results from touching it.  No other complaints.    Review of Systems   Constitutional: Negative for fatigue and unexpected weight change.   HENT: Per HPI.  Eyes: Negative for visual disturbance.   Respiratory: Negative for shortness of breath, hemoptysis   Cardiovascular: Negative for chest pain and palpitations.   Musculoskeletal: Negative for decreased ROM, back pain.   Skin: Negative for rash, sunburn, itching.   Neurological: Negative for dizziness and seizures.   Hematological: Negative for adenopathy. Does not bruise/bleed easily.   Endocrine: Negative for rapid weight loss/weight gain, heat/cold intolerance.     Past Medical History   Patient Active Problem List   Diagnosis    Type 2 diabetes mellitus with stage 3 chronic kidney disease, without long-term current use of insulin    Essential hypertension    Chronic renal impairment    Idiopathic chronic gout of multiple sites with tophus    HLD (hyperlipidemia)    BPH (benign prostatic hyperplasia)    Disc disease, degenerative, cervical    GERD (gastroesophageal reflux disease)    History of ulcerative colitis    CKD (chronic kidney disease) stage 3, GFR 30-59 ml/min    Proteinuria    DDD (degenerative disc disease)    Generalized osteoarthritis    Elevated PSA    S/P TURP    CAD (coronary artery disease)    Presence of arterial stent    GRADY (acute kidney injury)    Syncope due to orthostatic hypotension    Abnormal LFTs    SBO (small bowel obstruction)    High output ileostomy    History of gout    History of elevated PSA    History of BPH    Bowel obstruction    Parastomal hernia with obstruction and without gangrene    Incisional hernia, without obstruction or gangrene    Hyperkalemia     Metabolic acidosis with normal anion gap and bicarbonate losses    Pleural plaque    Aortic atherosclerosis    Type 2 diabetes mellitus with diabetic polyneuropathy, without long-term current use of insulin    Ileostomy in place    Status post repair of ventral hernia    Parotid mass    Prostate cancer    Polyneuropathy associated with underlying disease    Stage 4 chronic kidney disease    VT (ventricular tachycardia)    Paroxysmal ventricular tachycardia    Colostomy present    Hypokalemia    Dehydration    Abnormal EKG           Past Surgical History   Past Surgical History:   Procedure Laterality Date    cardiac stents      CATARACT EXTRACTION Bilateral     CERVICAL FUSION      colectomy and ileostomy  1985    EXCISION OF PAROTID GLAND Left 12/18/2020    Procedure: EXCISION, PAROTID GLAND;  Surgeon: Michael Pinzon MD;  Location: [facility] OR 2ND FLR;  Service: ENT;  Laterality: Left;    INSERTION OF IMPLANTABLE LOOP RECORDER  06/07/2021    INSERTION OF IMPLANTABLE LOOP RECORDER Left 6/7/2021    Procedure: INSERTION, IMPLANTABLE LOOP RECORDER;  Surgeon: Romario Dao MD;  Location: [facility] CATH LAB;  Service: Cardiology;  Laterality: Left;    LEFT HEART CATHETERIZATION Right 4/15/2021    Procedure: CATHETERIZATION, HEART, LEFT;  Surgeon: Marcio Jones MD;  Location: [facility] CATH LAB;  Service: Cardiology;  Laterality: Right;    LYSIS OF ADHESIONS N/A 11/9/2020    Procedure: LYSIS, ADHESIONS,  ERAS low;  Surgeon: CED Haley MD;  [facility];  Service: Colon and Rectal;  Laterality: N/A;    POUCHOSCOPY N/A 4/6/2022    Procedure: ENDOSCOPY, POUCH, SMALL INTESTINE, DIAGNOSTIC;  Surgeon: Dieter Juarez MD;  Location: [facility] ENDO (2ND FLR);  Service: Endoscopy;  Laterality: N/A;    REPAIR, HERNIA, PARASTOMAL N/A 11/9/2020    Procedure: REPAIR, HERNIA, PARASTOMAL;  Surgeon: CED Haley MD;  Location: [facility] OR [facility] FLR;  Service: Colon and Rectal;  Laterality: N/A;    TRANSURETHRAL  RESECTION OF PROSTATE (TURP) WITHOUT USE OF LASER N/A 1/23/2019    Procedure: TURP, WITHOUT USING LASER BIPOLAR;  Surgeon: Catarino Mota MD;  Location: [facility] OR [address] [place] [address];  Service: Urology;  Laterality: N/A;  1.5 HOURS         Family History   Family History   Problem Relation Age of Onset    Cancer Father     Diabetes Father     Dementia Mother     Melanoma Neg Hx     Hypertension Neg Hx     Arthritis Neg Hx            Social History   .  Social History     Socioeconomic History    Marital status:     Number of children: 1   Occupational History    Occupation:  x 44 years     Comment: Retired    Occupation: [employer]    Tobacco Use    Smoking status: Never    Smokeless tobacco: Never   Substance and Sexual Activity    Alcohol use: No    Drug use: No    Sexual activity: Not Currently     Partners: Female     Social Determinants of Health     Financial Resource Strain: Low Risk     Difficulty of Paying Living Expenses: Not hard at all   Food Insecurity: No Food Insecurity    Worried About Running Out of Food in the Last Year: Never true    Ran Out of Food in the Last Year: Never true   Transportation Needs: No Transportation Needs    Lack of Transportation (Medical): No    Lack of Transportation (Non-Medical): No   Physical Activity: Inactive    Days of Exercise per Week: 0 days    Minutes of Exercise per Session: 0 min   Stress: Stress Concern Present    Feeling of Stress : To some extent   Social Connections: Socially Isolated    Frequency of Communication with Friends and Family: More than three times a week    Frequency of Social Gatherings with Friends and Family: More than three times a week    Attends [employer] Services: Never    Active Member of Clubs or Organizations: No    Attends Club or Organization Meetings: Never    Marital Status:    Housing Stability: Low Risk     Unable to Pay for Housing in the Last Year: No    Number of Places Lived in the Last Year: 1     Unstable Housing in the Last Year: No         Allergies   Review of patient's allergies indicates:   Allergen Reactions    Crestor [rosuvastatin]     Lipitor [atorvastatin]            Physical Exam     There were no vitals filed for this visit.      Body mass index is 27.86 kg/m².      General: AOx3, NAD   Respiratory:  Symmetric chest rise, normal effort  Nose: No gross nasal septal deviation. Inferior Turbinates WNL bilaterally. No septal perforation. No masses/lesions.   Neck:  Well-healed left parotidectomy scar.  There is a roughly 3 mm nodule in the expected location of the greater auricular nerve that is tender to palpation consistent with a neuroma..  No cervical lymphadenopathy, thyromegaly or thyroid nodules.  Normal range of motion.    Face: House Brackmann I bilaterally.     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  I reassured him this is a benign process resulting from transection of this nerve.  Reassured.  Return PRN.

## 2022-12-12 NOTE — ASSESSMENT & PLAN NOTE
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  I reassured him this is a benign process resulting from transection of this nerve.  Reassured.  Return PRN.

## 2022-12-15 ENCOUNTER — PATIENT OUTREACH (OUTPATIENT)
Dept: ADMINISTRATIVE | Facility: HOSPITAL | Age: 72
End: 2022-12-15
Payer: MEDICARE

## 2022-12-15 NOTE — PROGRESS NOTES
Health Maintenance Due   Topic Date Due    Eye Exam  09/24/2021     Chart review done. HM updated. Immunizations reviewed & updated. Care Everywhere updated.

## 2022-12-16 ENCOUNTER — INFUSION (OUTPATIENT)
Dept: INFECTIOUS DISEASES | Facility: HOSPITAL | Age: 72
End: 2022-12-16
Attending: INTERNAL MEDICINE
Payer: MEDICARE

## 2022-12-16 VITALS — WEIGHT: 216.69 LBS | BODY MASS INDEX: 27.82 KG/M2 | HEART RATE: 72 BPM | TEMPERATURE: 96 F | OXYGEN SATURATION: 94 %

## 2022-12-16 DIAGNOSIS — E86.0 DEHYDRATION: Primary | ICD-10-CM

## 2022-12-16 DIAGNOSIS — N18.32 TYPE 2 DIABETES MELLITUS WITH STAGE 3B CHRONIC KIDNEY DISEASE, WITHOUT LONG-TERM CURRENT USE OF INSULIN: ICD-10-CM

## 2022-12-16 DIAGNOSIS — Z93.3 COLOSTOMY PRESENT: ICD-10-CM

## 2022-12-16 DIAGNOSIS — E11.22 TYPE 2 DIABETES MELLITUS WITH STAGE 3B CHRONIC KIDNEY DISEASE, WITHOUT LONG-TERM CURRENT USE OF INSULIN: ICD-10-CM

## 2022-12-16 PROCEDURE — 96360 HYDRATION IV INFUSION INIT: CPT

## 2022-12-16 PROCEDURE — 25000003 PHARM REV CODE 250: Performed by: NURSE PRACTITIONER

## 2022-12-16 RX ORDER — SODIUM CHLORIDE 0.9 % (FLUSH) 0.9 %
10 SYRINGE (ML) INJECTION
Status: CANCELLED | OUTPATIENT
Start: 2022-12-23

## 2022-12-16 RX ORDER — HEPARIN 100 UNIT/ML
500 SYRINGE INTRAVENOUS
Status: CANCELLED | OUTPATIENT
Start: 2022-12-23

## 2022-12-16 RX ADMIN — SODIUM CHLORIDE 1000 ML: 0.9 INJECTION, SOLUTION INTRAVENOUS at 08:12

## 2022-12-16 NOTE — PROGRESS NOTES
Pt received 1 liter NS x 1hr, pt tolerated well & left in NAD, next appt made                                                      Pt received 1 liter NS x 1hr, pt tolerated well & left in NAD, next appt made

## 2022-12-20 ENCOUNTER — OFFICE VISIT (OUTPATIENT)
Dept: PRIMARY CARE CLINIC | Facility: CLINIC | Age: 72
End: 2022-12-20
Payer: MEDICARE

## 2022-12-20 VITALS
SYSTOLIC BLOOD PRESSURE: 100 MMHG | RESPIRATION RATE: 17 BRPM | DIASTOLIC BLOOD PRESSURE: 72 MMHG | BODY MASS INDEX: 27.56 KG/M2 | OXYGEN SATURATION: 97 % | HEIGHT: 74 IN | HEART RATE: 77 BPM | WEIGHT: 214.75 LBS

## 2022-12-20 DIAGNOSIS — I10 ESSENTIAL HYPERTENSION: ICD-10-CM

## 2022-12-20 DIAGNOSIS — E78.5 HYPERLIPIDEMIA, UNSPECIFIED HYPERLIPIDEMIA TYPE: ICD-10-CM

## 2022-12-20 DIAGNOSIS — I95.1 ORTHOSTATIC HYPOTENSION: ICD-10-CM

## 2022-12-20 DIAGNOSIS — N18.32 TYPE 2 DIABETES MELLITUS WITH STAGE 3B CHRONIC KIDNEY DISEASE, WITHOUT LONG-TERM CURRENT USE OF INSULIN: Primary | ICD-10-CM

## 2022-12-20 DIAGNOSIS — E11.22 TYPE 2 DIABETES MELLITUS WITH STAGE 3B CHRONIC KIDNEY DISEASE, WITHOUT LONG-TERM CURRENT USE OF INSULIN: Primary | ICD-10-CM

## 2022-12-20 DIAGNOSIS — G57.52 TARSAL TUNNEL SYNDROME OF LEFT SIDE: ICD-10-CM

## 2022-12-20 PROCEDURE — 3066F NEPHROPATHY DOC TX: CPT | Mod: CPTII,S$GLB,, | Performed by: INTERNAL MEDICINE

## 2022-12-20 PROCEDURE — 1100F PR PT FALLS ASSESS DOC 2+ FALLS/FALL W/INJURY/YR: ICD-10-PCS | Mod: CPTII,S$GLB,, | Performed by: INTERNAL MEDICINE

## 2022-12-20 PROCEDURE — 1100F PTFALLS ASSESS-DOCD GE2>/YR: CPT | Mod: CPTII,S$GLB,, | Performed by: INTERNAL MEDICINE

## 2022-12-20 PROCEDURE — 3078F PR MOST RECENT DIASTOLIC BLOOD PRESSURE < 80 MM HG: ICD-10-PCS | Mod: CPTII,S$GLB,, | Performed by: INTERNAL MEDICINE

## 2022-12-20 PROCEDURE — 3051F HG A1C>EQUAL 7.0%<8.0%: CPT | Mod: CPTII,S$GLB,, | Performed by: INTERNAL MEDICINE

## 2022-12-20 PROCEDURE — 3074F PR MOST RECENT SYSTOLIC BLOOD PRESSURE < 130 MM HG: ICD-10-PCS | Mod: CPTII,S$GLB,, | Performed by: INTERNAL MEDICINE

## 2022-12-20 PROCEDURE — 99214 PR OFFICE/OUTPT VISIT, EST, LEVL IV, 30-39 MIN: ICD-10-PCS | Mod: S$GLB,,, | Performed by: INTERNAL MEDICINE

## 2022-12-20 PROCEDURE — 99999 PR PBB SHADOW E&M-EST. PATIENT-LVL V: CPT | Mod: PBBFAC,,, | Performed by: INTERNAL MEDICINE

## 2022-12-20 PROCEDURE — 3288F PR FALLS RISK ASSESSMENT DOCUMENTED: ICD-10-PCS | Mod: CPTII,S$GLB,, | Performed by: INTERNAL MEDICINE

## 2022-12-20 PROCEDURE — 1159F PR MEDICATION LIST DOCUMENTED IN MEDICAL RECORD: ICD-10-PCS | Mod: CPTII,S$GLB,, | Performed by: INTERNAL MEDICINE

## 2022-12-20 PROCEDURE — 3008F PR BODY MASS INDEX (BMI) DOCUMENTED: ICD-10-PCS | Mod: CPTII,S$GLB,, | Performed by: INTERNAL MEDICINE

## 2022-12-20 PROCEDURE — 3060F POS MICROALBUMINURIA REV: CPT | Mod: CPTII,S$GLB,, | Performed by: INTERNAL MEDICINE

## 2022-12-20 PROCEDURE — 3008F BODY MASS INDEX DOCD: CPT | Mod: CPTII,S$GLB,, | Performed by: INTERNAL MEDICINE

## 2022-12-20 PROCEDURE — 1159F MED LIST DOCD IN RCRD: CPT | Mod: CPTII,S$GLB,, | Performed by: INTERNAL MEDICINE

## 2022-12-20 PROCEDURE — 99214 OFFICE O/P EST MOD 30 MIN: CPT | Mod: S$GLB,,, | Performed by: INTERNAL MEDICINE

## 2022-12-20 PROCEDURE — 3051F PR MOST RECENT HEMOGLOBIN A1C LEVEL 7.0 - < 8.0%: ICD-10-PCS | Mod: CPTII,S$GLB,, | Performed by: INTERNAL MEDICINE

## 2022-12-20 PROCEDURE — 3288F FALL RISK ASSESSMENT DOCD: CPT | Mod: CPTII,S$GLB,, | Performed by: INTERNAL MEDICINE

## 2022-12-20 PROCEDURE — 4010F ACE/ARB THERAPY RXD/TAKEN: CPT | Mod: CPTII,S$GLB,, | Performed by: INTERNAL MEDICINE

## 2022-12-20 PROCEDURE — 1160F PR REVIEW ALL MEDS BY PRESCRIBER/CLIN PHARMACIST DOCUMENTED: ICD-10-PCS | Mod: CPTII,S$GLB,, | Performed by: INTERNAL MEDICINE

## 2022-12-20 PROCEDURE — 1160F RVW MEDS BY RX/DR IN RCRD: CPT | Mod: CPTII,S$GLB,, | Performed by: INTERNAL MEDICINE

## 2022-12-20 PROCEDURE — 4010F PR ACE/ARB THEARPY RXD/TAKEN: ICD-10-PCS | Mod: CPTII,S$GLB,, | Performed by: INTERNAL MEDICINE

## 2022-12-20 PROCEDURE — 99999 PR PBB SHADOW E&M-EST. PATIENT-LVL V: ICD-10-PCS | Mod: PBBFAC,,, | Performed by: INTERNAL MEDICINE

## 2022-12-20 PROCEDURE — 1126F AMNT PAIN NOTED NONE PRSNT: CPT | Mod: CPTII,S$GLB,, | Performed by: INTERNAL MEDICINE

## 2022-12-20 PROCEDURE — 3060F PR POS MICROALBUMINURIA RESULT DOCUMENTED/REVIEW: ICD-10-PCS | Mod: CPTII,S$GLB,, | Performed by: INTERNAL MEDICINE

## 2022-12-20 PROCEDURE — 3074F SYST BP LT 130 MM HG: CPT | Mod: CPTII,S$GLB,, | Performed by: INTERNAL MEDICINE

## 2022-12-20 PROCEDURE — 3066F PR DOCUMENTATION OF TREATMENT FOR NEPHROPATHY: ICD-10-PCS | Mod: CPTII,S$GLB,, | Performed by: INTERNAL MEDICINE

## 2022-12-20 PROCEDURE — 1126F PR PAIN SEVERITY QUANTIFIED, NO PAIN PRESENT: ICD-10-PCS | Mod: CPTII,S$GLB,, | Performed by: INTERNAL MEDICINE

## 2022-12-20 PROCEDURE — 3078F DIAST BP <80 MM HG: CPT | Mod: CPTII,S$GLB,, | Performed by: INTERNAL MEDICINE

## 2022-12-20 RX ORDER — CARVEDILOL 12.5 MG/1
12.5 TABLET ORAL 2 TIMES DAILY
COMMUNITY
Start: 2022-11-28 | End: 2023-02-06

## 2022-12-20 RX ORDER — LIDOCAINE HCL 4 G/100G
CREAM TOPICAL
Qty: 30 G | Refills: 1 | Status: SHIPPED | OUTPATIENT
Start: 2022-12-20 | End: 2023-02-01

## 2022-12-20 RX ORDER — DROXIDOPA 300 MG/1
600 CAPSULE ORAL
Status: ON HOLD | COMMUNITY
Start: 2022-11-30 | End: 2023-02-16 | Stop reason: HOSPADM

## 2022-12-20 RX ORDER — FLUDROCORTISONE ACETATE 0.1 MG/1
200 TABLET ORAL DAILY
Qty: 60 TABLET | Refills: 2 | Status: SHIPPED | OUTPATIENT
Start: 2022-12-20 | End: 2023-01-19

## 2022-12-20 NOTE — PROGRESS NOTES
Subjective:       Patient ID: Jarrett Charlton Jr. is a 72 y.o. male.    Chief Complaint: Follow-up (Hospital Follow up ) and Dizziness    HPI  patient visit today for for hospital follow-up since last visit patient was in the ER twice for orthostatic hypotension better with IV fluid normal saline he is now scheduled for routine IV fluids with normal saline every 2 weeks he has been evaluated by Cardiology his states that he had a positive table tilt test his on midodrine and Florinef and has been drinking enough fluids every day he denies short of breath chest pain dyspnea with exertion his chronic renal insufficiency stable a sometime worsening due to dehydration from high output ileostomy his still have chronic burning pain in the medial aspect of the left ankle no skin redness breakdown  Review of Systems    Objective:      Physical Exam  Vitals and nursing note reviewed.   Constitutional:       General: He is not in acute distress.     Appearance: He is well-developed.   HENT:      Head: Normocephalic and atraumatic.      Right Ear: External ear normal.      Left Ear: External ear normal.      Nose: Nose normal.      Mouth/Throat:      Pharynx: No oropharyngeal exudate.   Eyes:      Conjunctiva/sclera: Conjunctivae normal.      Pupils: Pupils are equal, round, and reactive to light.   Neck:      Thyroid: No thyromegaly.   Cardiovascular:      Rate and Rhythm: Normal rate and regular rhythm.      Heart sounds: Normal heart sounds. No murmur heard.    No friction rub. No gallop.   Pulmonary:      Effort: Pulmonary effort is normal. No respiratory distress.      Breath sounds: Normal breath sounds. No wheezing.   Abdominal:      General: Bowel sounds are normal. There is no distension.      Palpations: Abdomen is soft.      Tenderness: There is no abdominal tenderness.   Musculoskeletal:         General: No tenderness or deformity. Normal range of motion.      Cervical back: Normal range of motion and neck supple.    Lymphadenopathy:      Cervical: No cervical adenopathy.   Skin:     General: Skin is warm and dry.      Findings: No erythema or rash.   Neurological:      Mental Status: He is alert and oriented to person, place, and time.   Psychiatric:         Mood and Affect: Mood normal.         Thought Content: Thought content normal.         Judgment: Judgment normal.       Assessment:       1. Type 2 diabetes mellitus with stage 3b chronic kidney disease, without long-term current use of insulin    2. Tarsal tunnel syndrome of left side    3. Orthostatic hypotension    4. Hyperlipidemia, unspecified hyperlipidemia type    5. Essential hypertension          Plan:       Type 2 diabetes mellitus with stage 3b chronic kidney disease, without long-term current use of insulin  Comments:  A fairly control with current medication and diet  Orders:  -     Foot Exam Performed  -     Comprehensive Metabolic Panel; Future; Expected date: 12/21/2022  -     Diabetes Digital Medicine (DDMP) Enrollment Order  -     Diabetes Digital Medicine (DDMP): Assign Onboarding Questionnaires    Tarsal tunnel syndrome of left side  Comments:  Will try local Lidoderm cream if not better will consult podiatrist  Orders:  -     LIDOcaine HCL 4 % Crea; Apply 3-4 times daily prb ankle pain  Dispense: 30 g; Refill: 1    Orthostatic hypotension  Comments:  Continue to keep increase p.o. fluid increase Florinef to 0.2 mg a day and blood test  Orders:  -     fludrocortisone (FLORINEF) 0.1 mg Tab; Take 2 tablets (200 mcg total) by mouth once daily.  Dispense: 60 tablet; Refill: 2  -     CBC Auto Differential; Future; Expected date: 12/21/2022  -     Comprehensive Metabolic Panel; Future; Expected date: 12/21/2022  -     Urinalysis; Future; Expected date: 12/21/2022    Hyperlipidemia, unspecified hyperlipidemia type  Comments:  Well controlled with medication and diet  Orders:  -     Lipids Digital Medicine (LDMP) Enrollment Order  -     Lipids Digital Medicine  ([facility]): Assign Onboarding Questionnaires    Essential hypertension  Comments:  Blood pressure is well control borderline low at the present time  Orders:  -     Hypertension Digital Medicine (HDM) Enrollment Order  -     Hypertension Digital Medicine ([facility]): Assign Onboarding Questionnaires    Other orders  -     Cancel: Ambulatory referral/consult to Dermatology; Future; Expected date: 12/21/2022        Medication List with Changes/Refills   New Medications    FLUDROCORTISONE (FLORINEF) 0.1 MG TAB    Take 2 tablets (200 mcg total) by mouth once daily.    LIDOCAINE HCL 4 % CREA    Apply 3-4 times daily prb ankle pain   Current Medications    ACCU-CHEK PAUL CONTROL SOLN SOLN    1 drop by NOT APPLICABLE route once daily.    ACCU-CHEK SOFTCLIX LANCETS MISC    1 lancet by NOT APPLICABLE route once daily.    ALLOPURINOL (ZYLOPRIM) 300 MG TABLET    Take 1.5 tablets (450 mg total) by mouth once daily.    ALPRAZOLAM (XANAX) 0.5 MG TABLET    Take 0.5 mg by mouth nightly as needed for Anxiety.    AMIODARONE (PACERONE) 200 MG TAB    Take 1 tablet (200 mg total) by mouth 2 (two) times daily.    ASPIRIN (ECOTRIN) 81 MG EC TABLET    Take 81 mg by mouth once daily.    BD ALCOHOL SWABS PAD        BLOOD SUGAR DIAGNOSTIC (ONE TOUCH ULTRA TEST) [facility]    USE ONE STRIP TO CHECK GLUCOSE EVERY DAY    BLOOD-GLUCOSE METER (ACCU-CHEK GUIDE GLUCOSE METER) [facility]    [place]uc[place]ck BID    BRIMONIDINE 0.2% (ALPHAGAN) 0.2 % DROP    INSTILL 1 DROP INTO EACH EYE TWICE DAILY    CALCIUM CITRATE-VITAMIN D3 (CITRACAL + D MAXIMUM) 315 MG-6.25 MCG (250 UNIT) TAB    Take 1 tablet by mouth once daily.    CARVEDILOL (COREG) 3.125 MG TABLET        CLOPIDOGREL (PLAVIX) 75 MG TABLET    Take 75 mg by mouth once daily.    DROXIDOPA 100 MG CAP    Take 2 capsules by mouth once daily. 400mg daily    DROXIDOPA 300 MG CAP        FIBER, CALCIUM POLYCARBOPHIL, 625 MG TABLET    Take 3 tablets by mouth before dinner.    GLIMEPIRIDE (AMARYL) 4 MG TABLET    Take 4 mg by mouth 2  (two) times daily before meals.    MAGNESIUM OXIDE (MAG-OX) 400 MG (241.3 MG MAGNESIUM) TABLET    Take 400 mg by mouth once daily.    MIDODRINE (PROAMATINE) 5 MG TAB    Take 1 tablet (5 mg total) by mouth every 12 (twelve) hours.    PANTOPRAZOLE (PROTONIX) 40 MG TABLET    Take 40 mg by mouth once daily.    PRAVASTATIN (PRAVACHOL) 40 MG TABLET    Take 2 tablets (80 mg total) by mouth once daily. 80mg daily   Discontinued Medications    FLUDROCORTISONE (FLORINEF) 0.1 MG TAB

## 2022-12-23 ENCOUNTER — INFUSION (OUTPATIENT)
Dept: INFECTIOUS DISEASES | Facility: HOSPITAL | Age: 72
End: 2022-12-23
Payer: MEDICARE

## 2022-12-23 VITALS
WEIGHT: 218.81 LBS | OXYGEN SATURATION: 97 % | BODY MASS INDEX: 28.09 KG/M2 | DIASTOLIC BLOOD PRESSURE: 66 MMHG | SYSTOLIC BLOOD PRESSURE: 122 MMHG | HEART RATE: 60 BPM | TEMPERATURE: 97 F

## 2022-12-23 DIAGNOSIS — E86.0 DEHYDRATION: Primary | ICD-10-CM

## 2022-12-23 DIAGNOSIS — E11.22 TYPE 2 DIABETES MELLITUS WITH STAGE 3B CHRONIC KIDNEY DISEASE, WITHOUT LONG-TERM CURRENT USE OF INSULIN: ICD-10-CM

## 2022-12-23 DIAGNOSIS — N18.32 TYPE 2 DIABETES MELLITUS WITH STAGE 3B CHRONIC KIDNEY DISEASE, WITHOUT LONG-TERM CURRENT USE OF INSULIN: ICD-10-CM

## 2022-12-23 DIAGNOSIS — Z93.3 COLOSTOMY PRESENT: ICD-10-CM

## 2022-12-23 PROCEDURE — 96360 HYDRATION IV INFUSION INIT: CPT

## 2022-12-23 PROCEDURE — 25000003 PHARM REV CODE 250: Performed by: NURSE PRACTITIONER

## 2022-12-23 RX ORDER — HEPARIN 100 UNIT/ML
500 SYRINGE INTRAVENOUS
Status: CANCELLED | OUTPATIENT
Start: 2022-12-30

## 2022-12-23 RX ORDER — SODIUM CHLORIDE 0.9 % (FLUSH) 0.9 %
10 SYRINGE (ML) INJECTION
Status: CANCELLED | OUTPATIENT
Start: 2022-12-30

## 2022-12-23 RX ADMIN — SODIUM CHLORIDE 1000 ML: 9 INJECTION, SOLUTION INTRAVENOUS at 07:12

## 2022-12-29 ENCOUNTER — TELEPHONE (OUTPATIENT)
Dept: NEPHROLOGY | Facility: CLINIC | Age: 72
End: 2022-12-29
Payer: MEDICARE

## 2022-12-29 DIAGNOSIS — N18.32 STAGE 3B CHRONIC KIDNEY DISEASE: Primary | ICD-10-CM

## 2022-12-29 DIAGNOSIS — N17.9 AKI (ACUTE KIDNEY INJURY): ICD-10-CM

## 2022-12-29 NOTE — TELEPHONE ENCOUNTER
Received fax from cardiologist asking if DDAVP or Samsca are options for him. Do not believe these are suitable options at this time.  Patient is taking fiber supplements and is not having liquid stools from his ostomy.   He is drinking a lot and urinating a lot.  Will have him quantify both PO intake and 24 hour urine output. He has urinals at home. He will do it over the weekend and call with results.  Will also increase 1 L NS to twice a week. Educated on fluid overload signs to report.    Discussed pt with Dr. Crawford.         ----- Message from Gricel Watkins MA sent at 12/29/2022  8:57 AM CST -----  Contact: pt    ----- Message -----  From: Grisel Marroquin  Sent: 12/29/2022   8:26 AM CST  To: Irina Carlton Staff    Pt calling to advise that he will be faxing over a paper from his cardiologist, due to his kidneys not doing well. Please call and discuss once that has been received      Confirmed contact below:  Contact Name:Jarrett Charlton  Phone Number: [phone]

## 2022-12-30 ENCOUNTER — INFUSION (OUTPATIENT)
Dept: INFECTIOUS DISEASES | Facility: HOSPITAL | Age: 72
End: 2022-12-30
Payer: MEDICARE

## 2022-12-30 VITALS
SYSTOLIC BLOOD PRESSURE: 176 MMHG | TEMPERATURE: 96 F | DIASTOLIC BLOOD PRESSURE: 90 MMHG | RESPIRATION RATE: 18 BRPM | WEIGHT: 219 LBS | HEART RATE: 69 BPM | OXYGEN SATURATION: 97 % | BODY MASS INDEX: 28.12 KG/M2

## 2022-12-30 DIAGNOSIS — E11.22 TYPE 2 DIABETES MELLITUS WITH STAGE 3B CHRONIC KIDNEY DISEASE, WITHOUT LONG-TERM CURRENT USE OF INSULIN: ICD-10-CM

## 2022-12-30 DIAGNOSIS — E86.0 DEHYDRATION: Primary | ICD-10-CM

## 2022-12-30 DIAGNOSIS — Z93.3 COLOSTOMY PRESENT: ICD-10-CM

## 2022-12-30 DIAGNOSIS — N18.32 TYPE 2 DIABETES MELLITUS WITH STAGE 3B CHRONIC KIDNEY DISEASE, WITHOUT LONG-TERM CURRENT USE OF INSULIN: ICD-10-CM

## 2022-12-30 PROCEDURE — 96360 HYDRATION IV INFUSION INIT: CPT | Mod: HCNC

## 2022-12-30 PROCEDURE — 25000003 PHARM REV CODE 250: Mod: HCNC | Performed by: NURSE PRACTITIONER

## 2022-12-30 RX ORDER — SODIUM CHLORIDE 0.9 % (FLUSH) 0.9 %
10 SYRINGE (ML) INJECTION
Status: CANCELLED | OUTPATIENT
Start: 2023-01-01

## 2022-12-30 RX ORDER — HEPARIN 100 UNIT/ML
500 SYRINGE INTRAVENOUS
Status: CANCELLED | OUTPATIENT
Start: 2023-01-01

## 2022-12-30 RX ADMIN — SODIUM CHLORIDE 1000 ML: 0.9 INJECTION, SOLUTION INTRAVENOUS at 08:12

## 2022-12-30 NOTE — PROGRESS NOTES
Pt received 1 liter NS x 1hr, pt tolerated well & left in NAD, next appt made

## 2023-01-03 ENCOUNTER — TELEPHONE (OUTPATIENT)
Dept: NEPHROLOGY | Facility: CLINIC | Age: 73
End: 2023-01-03
Payer: MEDICARE

## 2023-01-03 ENCOUNTER — CLINICAL SUPPORT (OUTPATIENT)
Dept: PRIMARY CARE CLINIC | Facility: CLINIC | Age: 73
End: 2023-01-03
Payer: MEDICARE

## 2023-01-03 VITALS
RESPIRATION RATE: 16 BRPM | DIASTOLIC BLOOD PRESSURE: 86 MMHG | SYSTOLIC BLOOD PRESSURE: 136 MMHG | OXYGEN SATURATION: 97 % | HEART RATE: 72 BPM

## 2023-01-03 DIAGNOSIS — I10 HYPERTENSION, UNSPECIFIED TYPE: Primary | ICD-10-CM

## 2023-01-03 PROCEDURE — 99999 PR PBB SHADOW E&M-EST. PATIENT-LVL III: CPT | Mod: PBBFAC,HCNC,,

## 2023-01-03 PROCEDURE — 99999 PR PBB SHADOW E&M-EST. PATIENT-LVL III: ICD-10-PCS | Mod: PBBFAC,HCNC,,

## 2023-01-03 NOTE — PROGRESS NOTES
Patient came in on the nurse schedule for a BP check. Patient stated that he took his BP meds at 7am this morning. Patients vitals were checked and reading was 136/86.

## 2023-01-03 NOTE — TELEPHONE ENCOUNTER
----- Message from Maeve Dubose MA sent at 1/3/2023  8:22 AM CST -----  Regarding: Pt advice  Contact: pt  [phone]  Pt called stated that he  will be faxing over  his urine out put report this morning just would like a call back to let him know that it was received  please call pt  [phone]

## 2023-01-04 DIAGNOSIS — N18.32 STAGE 3B CHRONIC KIDNEY DISEASE: Primary | ICD-10-CM

## 2023-01-05 ENCOUNTER — TELEPHONE (OUTPATIENT)
Dept: NEPHROLOGY | Facility: CLINIC | Age: 73
End: 2023-01-05
Payer: MEDICARE

## 2023-01-05 DIAGNOSIS — R35.89 POLYURIA: Primary | ICD-10-CM

## 2023-01-05 NOTE — TELEPHONE ENCOUNTER
----- Message from Bianka Arevalo NP sent at 1/5/2023 12:43 PM CST -----  Pt is going to come  two orange jugs tomorrow for 24 hour urine. He needs 2 b/c he will overflow just one.  I put orders in (including a miscellaneous send out for 24 hr urine glucose).    The day he turns it in, he should also get serum osm and do the random urine orders I have in (Na, K, Cl, and misc. Sendout for urine glucose).    Thanks,  Bianka

## 2023-01-05 NOTE — PROGRESS NOTES
Pt faxed intake and urine output for 24 hrs:   Intake: 2564 mL  Urine output: 4,000 mL  Pt also has ostomy, though he has not had liquid stools from it.    Does not eat high protein diet.

## 2023-01-06 ENCOUNTER — INFUSION (OUTPATIENT)
Dept: INFECTIOUS DISEASES | Facility: HOSPITAL | Age: 73
End: 2023-01-06
Attending: INTERNAL MEDICINE
Payer: MEDICARE

## 2023-01-06 VITALS
TEMPERATURE: 96 F | HEART RATE: 61 BPM | DIASTOLIC BLOOD PRESSURE: 95 MMHG | SYSTOLIC BLOOD PRESSURE: 179 MMHG | OXYGEN SATURATION: 98 %

## 2023-01-06 DIAGNOSIS — E86.0 DEHYDRATION: Primary | ICD-10-CM

## 2023-01-06 DIAGNOSIS — N18.32 TYPE 2 DIABETES MELLITUS WITH STAGE 3B CHRONIC KIDNEY DISEASE, WITHOUT LONG-TERM CURRENT USE OF INSULIN: ICD-10-CM

## 2023-01-06 DIAGNOSIS — E11.22 TYPE 2 DIABETES MELLITUS WITH STAGE 3B CHRONIC KIDNEY DISEASE, WITHOUT LONG-TERM CURRENT USE OF INSULIN: ICD-10-CM

## 2023-01-06 DIAGNOSIS — Z93.3 COLOSTOMY PRESENT: ICD-10-CM

## 2023-01-06 PROCEDURE — 25000003 PHARM REV CODE 250: Mod: HCNC | Performed by: NURSE PRACTITIONER

## 2023-01-06 PROCEDURE — 96360 HYDRATION IV INFUSION INIT: CPT | Mod: HCNC

## 2023-01-06 RX ORDER — SODIUM CHLORIDE 0.9 % (FLUSH) 0.9 %
10 SYRINGE (ML) INJECTION
Status: CANCELLED | OUTPATIENT
Start: 2023-01-08

## 2023-01-06 RX ORDER — HEPARIN 100 UNIT/ML
500 SYRINGE INTRAVENOUS
Status: CANCELLED | OUTPATIENT
Start: 2023-01-08

## 2023-01-06 RX ADMIN — SODIUM CHLORIDE 1000 ML: 9 INJECTION, SOLUTION INTRAVENOUS at 09:01

## 2023-01-06 NOTE — PROGRESS NOTES
Pt received 1 liter NS x 1hr, pt tolerated well & left in NAD, next appt made

## 2023-01-13 ENCOUNTER — INFUSION (OUTPATIENT)
Dept: INFECTIOUS DISEASES | Facility: HOSPITAL | Age: 73
End: 2023-01-13
Attending: INTERNAL MEDICINE
Payer: MEDICARE

## 2023-01-13 VITALS
TEMPERATURE: 96 F | BODY MASS INDEX: 27.78 KG/M2 | DIASTOLIC BLOOD PRESSURE: 66 MMHG | SYSTOLIC BLOOD PRESSURE: 163 MMHG | HEART RATE: 80 BPM | WEIGHT: 216.38 LBS

## 2023-01-13 DIAGNOSIS — N18.32 TYPE 2 DIABETES MELLITUS WITH STAGE 3B CHRONIC KIDNEY DISEASE, WITHOUT LONG-TERM CURRENT USE OF INSULIN: ICD-10-CM

## 2023-01-13 DIAGNOSIS — E86.0 DEHYDRATION: Primary | ICD-10-CM

## 2023-01-13 DIAGNOSIS — Z93.3 COLOSTOMY PRESENT: ICD-10-CM

## 2023-01-13 DIAGNOSIS — E11.22 TYPE 2 DIABETES MELLITUS WITH STAGE 3B CHRONIC KIDNEY DISEASE, WITHOUT LONG-TERM CURRENT USE OF INSULIN: ICD-10-CM

## 2023-01-13 PROCEDURE — 96360 HYDRATION IV INFUSION INIT: CPT | Mod: HCNC

## 2023-01-13 PROCEDURE — 25000003 PHARM REV CODE 250: Mod: HCNC | Performed by: NURSE PRACTITIONER

## 2023-01-13 RX ORDER — SODIUM CHLORIDE 0.9 % (FLUSH) 0.9 %
10 SYRINGE (ML) INJECTION
Status: CANCELLED | OUTPATIENT
Start: 2023-01-15

## 2023-01-13 RX ORDER — HEPARIN 100 UNIT/ML
500 SYRINGE INTRAVENOUS
Status: CANCELLED | OUTPATIENT
Start: 2023-01-15

## 2023-01-13 RX ADMIN — SODIUM CHLORIDE 1000 ML: 9 INJECTION, SOLUTION INTRAVENOUS at 09:01

## 2023-01-13 NOTE — PROGRESS NOTES
Pt received 1 liter NS x 1hr, pt tolerated well & left in NAD, next appt made

## 2023-01-18 DIAGNOSIS — E11.22 TYPE 2 DIABETES MELLITUS WITH STAGE 3B CHRONIC KIDNEY DISEASE, WITHOUT LONG-TERM CURRENT USE OF INSULIN: ICD-10-CM

## 2023-01-18 DIAGNOSIS — N18.32 TYPE 2 DIABETES MELLITUS WITH STAGE 3B CHRONIC KIDNEY DISEASE, WITHOUT LONG-TERM CURRENT USE OF INSULIN: ICD-10-CM

## 2023-01-18 NOTE — TELEPHONE ENCOUNTER
----- Message from Evelai Schmidt sent at 1/18/2023  4:10 PM CST -----  Contact: [phone]  Requesting an RX refill or new RX.  Is this a refill or new RX: NEW  RX name and strength (copy/paste from chart):  test strips accu check machine   Is this a 30 day or 90 day RX: 90  Pharmacy name and phone # (copy/paste from chart):        [facility] - [facility] [address] [facility]  [address] [facility] [address]  Phone: [phone] Fax: [phone]      The doctors have asked that we provide their patients with the following 2 reminders -- prescription refills can take up to 72 hours, and a friendly reminder that in the future you can use your [facility] account to request refills: ware

## 2023-01-18 NOTE — TELEPHONE ENCOUNTER
No new care gaps identified.  [facility] Embedded Care Gaps. Reference number: [id number]. 1/18/2023   4:19:42 PM CST

## 2023-01-19 ENCOUNTER — TELEPHONE (OUTPATIENT)
Dept: PRIMARY CARE CLINIC | Facility: CLINIC | Age: 73
End: 2023-01-19
Payer: MEDICARE

## 2023-01-19 DIAGNOSIS — N18.32 TYPE 2 DIABETES MELLITUS WITH STAGE 3B CHRONIC KIDNEY DISEASE, WITHOUT LONG-TERM CURRENT USE OF INSULIN: ICD-10-CM

## 2023-01-19 DIAGNOSIS — E11.22 TYPE 2 DIABETES MELLITUS WITH STAGE 3B CHRONIC KIDNEY DISEASE, WITHOUT LONG-TERM CURRENT USE OF INSULIN: ICD-10-CM

## 2023-01-19 RX ORDER — LANCETS
EACH MISCELLANEOUS
Qty: 200 EACH | Refills: 1 | Status: SHIPPED | OUTPATIENT
Start: 2023-01-19 | End: 2023-04-04 | Stop reason: SDUPTHER

## 2023-01-19 NOTE — TELEPHONE ENCOUNTER
----- Message from Anette Stanton sent at 1/19/2023 12:53 PM CST -----  Contact: Alysha/Tiny [phone]  Diabetic or Medical Supplies.  What supplies are needed: Test Strips  What is the brand name of the supplies: ACCU-CHEK GUIDE GLUCOSE METER  Is this a refill or new prescription:  New  Who prescribed the supplies:  Dr Gonzalez  Pharmacy or company name, phone # and location:    [facility] - [facility] [address]  [address] [facility] [address]  Phone: [phone] Fax: [phone]    Would the patient like a call back, or a response through their [facility] portal?:  Call back    Comments:  Patient is out of the testing supply.  Patient is testing 3X's daily     Please call and advise.    Thank You

## 2023-01-20 ENCOUNTER — INFUSION (OUTPATIENT)
Dept: INFECTIOUS DISEASES | Facility: HOSPITAL | Age: 73
End: 2023-01-20
Attending: INTERNAL MEDICINE
Payer: MEDICARE

## 2023-01-20 VITALS
OXYGEN SATURATION: 97 % | SYSTOLIC BLOOD PRESSURE: 180 MMHG | WEIGHT: 216.38 LBS | BODY MASS INDEX: 27.78 KG/M2 | DIASTOLIC BLOOD PRESSURE: 95 MMHG | RESPIRATION RATE: 18 BRPM | TEMPERATURE: 97 F | HEART RATE: 73 BPM

## 2023-01-20 DIAGNOSIS — E11.22 TYPE 2 DIABETES MELLITUS WITH STAGE 3B CHRONIC KIDNEY DISEASE, WITHOUT LONG-TERM CURRENT USE OF INSULIN: ICD-10-CM

## 2023-01-20 DIAGNOSIS — E86.0 DEHYDRATION: Primary | ICD-10-CM

## 2023-01-20 DIAGNOSIS — N18.32 TYPE 2 DIABETES MELLITUS WITH STAGE 3B CHRONIC KIDNEY DISEASE, WITHOUT LONG-TERM CURRENT USE OF INSULIN: ICD-10-CM

## 2023-01-20 DIAGNOSIS — Z93.3 COLOSTOMY PRESENT: ICD-10-CM

## 2023-01-20 PROCEDURE — 25000003 PHARM REV CODE 250: Mod: HCNC | Performed by: NURSE PRACTITIONER

## 2023-01-20 PROCEDURE — 96360 HYDRATION IV INFUSION INIT: CPT | Mod: HCNC

## 2023-01-20 RX ORDER — SODIUM CHLORIDE 0.9 % (FLUSH) 0.9 %
10 SYRINGE (ML) INJECTION
Status: CANCELLED | OUTPATIENT
Start: 2023-01-22

## 2023-01-20 RX ORDER — HEPARIN 100 UNIT/ML
500 SYRINGE INTRAVENOUS
Status: CANCELLED | OUTPATIENT
Start: 2023-01-22

## 2023-01-20 RX ADMIN — SODIUM CHLORIDE 1000 ML: 9 INJECTION, SOLUTION INTRAVENOUS at 08:01

## 2023-01-20 NOTE — PROGRESS NOTES
Pt received 1 liter NS x 1hr, pt tolerated well & left in NAD, next appt made

## 2023-01-24 ENCOUNTER — INFUSION (OUTPATIENT)
Dept: INFECTIOUS DISEASES | Facility: HOSPITAL | Age: 73
End: 2023-01-24
Attending: INTERNAL MEDICINE
Payer: MEDICARE

## 2023-01-24 VITALS
BODY MASS INDEX: 27.75 KG/M2 | RESPIRATION RATE: 18 BRPM | TEMPERATURE: 97 F | OXYGEN SATURATION: 99 % | HEART RATE: 64 BPM | SYSTOLIC BLOOD PRESSURE: 177 MMHG | DIASTOLIC BLOOD PRESSURE: 86 MMHG | WEIGHT: 216.19 LBS

## 2023-01-24 DIAGNOSIS — E11.22 TYPE 2 DIABETES MELLITUS WITH STAGE 3B CHRONIC KIDNEY DISEASE, WITHOUT LONG-TERM CURRENT USE OF INSULIN: ICD-10-CM

## 2023-01-24 DIAGNOSIS — Z93.3 COLOSTOMY PRESENT: ICD-10-CM

## 2023-01-24 DIAGNOSIS — E86.0 DEHYDRATION: Primary | ICD-10-CM

## 2023-01-24 DIAGNOSIS — N18.32 TYPE 2 DIABETES MELLITUS WITH STAGE 3B CHRONIC KIDNEY DISEASE, WITHOUT LONG-TERM CURRENT USE OF INSULIN: ICD-10-CM

## 2023-01-24 PROCEDURE — 25000003 PHARM REV CODE 250: Mod: HCNC | Performed by: NURSE PRACTITIONER

## 2023-01-24 PROCEDURE — 96360 HYDRATION IV INFUSION INIT: CPT | Mod: HCNC

## 2023-01-24 RX ORDER — SODIUM CHLORIDE 0.9 % (FLUSH) 0.9 %
10 SYRINGE (ML) INJECTION
Status: CANCELLED | OUTPATIENT
Start: 2023-01-26

## 2023-01-24 RX ORDER — HEPARIN 100 UNIT/ML
500 SYRINGE INTRAVENOUS
Status: CANCELLED | OUTPATIENT
Start: 2023-01-26

## 2023-01-24 RX ADMIN — SODIUM CHLORIDE 1000 ML: 9 INJECTION, SOLUTION INTRAVENOUS at 08:01

## 2023-01-24 NOTE — PROGRESS NOTES
Pt received 1 liter NS x 1hr, pt tolerated well & left in NAD, next appt made

## 2023-01-27 ENCOUNTER — INFUSION (OUTPATIENT)
Dept: INFECTIOUS DISEASES | Facility: HOSPITAL | Age: 73
End: 2023-01-27
Payer: MEDICARE

## 2023-01-27 VITALS
RESPIRATION RATE: 18 BRPM | HEART RATE: 67 BPM | TEMPERATURE: 98 F | BODY MASS INDEX: 27.54 KG/M2 | OXYGEN SATURATION: 99 % | DIASTOLIC BLOOD PRESSURE: 84 MMHG | WEIGHT: 214.5 LBS | SYSTOLIC BLOOD PRESSURE: 179 MMHG

## 2023-01-27 DIAGNOSIS — E11.22 TYPE 2 DIABETES MELLITUS WITH STAGE 3B CHRONIC KIDNEY DISEASE, WITHOUT LONG-TERM CURRENT USE OF INSULIN: ICD-10-CM

## 2023-01-27 DIAGNOSIS — N18.32 TYPE 2 DIABETES MELLITUS WITH STAGE 3B CHRONIC KIDNEY DISEASE, WITHOUT LONG-TERM CURRENT USE OF INSULIN: ICD-10-CM

## 2023-01-27 DIAGNOSIS — Z93.3 COLOSTOMY PRESENT: ICD-10-CM

## 2023-01-27 DIAGNOSIS — E86.0 DEHYDRATION: Primary | ICD-10-CM

## 2023-01-27 PROCEDURE — 25000003 PHARM REV CODE 250: Mod: HCNC | Performed by: NURSE PRACTITIONER

## 2023-01-27 PROCEDURE — 96360 HYDRATION IV INFUSION INIT: CPT | Mod: HCNC

## 2023-01-27 RX ORDER — HEPARIN 100 UNIT/ML
500 SYRINGE INTRAVENOUS
Status: CANCELLED | OUTPATIENT
Start: 2023-01-30

## 2023-01-27 RX ORDER — SODIUM CHLORIDE 0.9 % (FLUSH) 0.9 %
10 SYRINGE (ML) INJECTION
Status: CANCELLED | OUTPATIENT
Start: 2023-01-30

## 2023-01-27 RX ADMIN — SODIUM CHLORIDE 1000 ML: 9 INJECTION, SOLUTION INTRAVENOUS at 09:01

## 2023-01-27 NOTE — PROGRESS NOTES
Pt received 1 liter NS x 1hr, pt tolerated well & left in NAD, next appt made

## 2023-01-31 ENCOUNTER — INFUSION (OUTPATIENT)
Dept: INFECTIOUS DISEASES | Facility: HOSPITAL | Age: 73
End: 2023-01-31
Attending: INTERNAL MEDICINE
Payer: MEDICARE

## 2023-01-31 VITALS
SYSTOLIC BLOOD PRESSURE: 169 MMHG | OXYGEN SATURATION: 95 % | BODY MASS INDEX: 27.67 KG/M2 | WEIGHT: 215.5 LBS | TEMPERATURE: 96 F | DIASTOLIC BLOOD PRESSURE: 81 MMHG | HEART RATE: 70 BPM

## 2023-01-31 DIAGNOSIS — N18.32 TYPE 2 DIABETES MELLITUS WITH STAGE 3B CHRONIC KIDNEY DISEASE, WITHOUT LONG-TERM CURRENT USE OF INSULIN: ICD-10-CM

## 2023-01-31 DIAGNOSIS — Z93.3 COLOSTOMY PRESENT: ICD-10-CM

## 2023-01-31 DIAGNOSIS — E11.22 TYPE 2 DIABETES MELLITUS WITH STAGE 3B CHRONIC KIDNEY DISEASE, WITHOUT LONG-TERM CURRENT USE OF INSULIN: ICD-10-CM

## 2023-01-31 DIAGNOSIS — E86.0 DEHYDRATION: Primary | ICD-10-CM

## 2023-01-31 PROCEDURE — 96360 HYDRATION IV INFUSION INIT: CPT | Mod: HCNC

## 2023-01-31 PROCEDURE — 25000003 PHARM REV CODE 250: Mod: HCNC | Performed by: NURSE PRACTITIONER

## 2023-01-31 RX ORDER — SODIUM CHLORIDE 0.9 % (FLUSH) 0.9 %
10 SYRINGE (ML) INJECTION
Status: CANCELLED | OUTPATIENT
Start: 2023-02-02

## 2023-01-31 RX ORDER — HEPARIN 100 UNIT/ML
500 SYRINGE INTRAVENOUS
Status: CANCELLED | OUTPATIENT
Start: 2023-02-02

## 2023-01-31 RX ADMIN — SODIUM CHLORIDE 1000 ML: 9 INJECTION, SOLUTION INTRAVENOUS at 08:01

## 2023-01-31 NOTE — PROGRESS NOTES
Pt received 1 liter NS x 1hr, pt tolerated well & left in NAD, next appt made

## 2023-02-01 ENCOUNTER — OFFICE VISIT (OUTPATIENT)
Dept: CARDIOLOGY | Facility: CLINIC | Age: 73
End: 2023-02-01
Payer: MEDICARE

## 2023-02-01 VITALS
DIASTOLIC BLOOD PRESSURE: 86 MMHG | WEIGHT: 212.75 LBS | OXYGEN SATURATION: 98 % | SYSTOLIC BLOOD PRESSURE: 138 MMHG | HEART RATE: 68 BPM | HEIGHT: 74 IN | BODY MASS INDEX: 27.3 KG/M2

## 2023-02-01 DIAGNOSIS — I25.10 CORONARY ARTERY DISEASE INVOLVING NATIVE CORONARY ARTERY OF NATIVE HEART WITHOUT ANGINA PECTORIS: ICD-10-CM

## 2023-02-01 DIAGNOSIS — R55 SYNCOPE: ICD-10-CM

## 2023-02-01 DIAGNOSIS — I45.2 BIFASCICULAR BLOCK: ICD-10-CM

## 2023-02-01 DIAGNOSIS — E78.5 HYPERLIPIDEMIA, UNSPECIFIED HYPERLIPIDEMIA TYPE: ICD-10-CM

## 2023-02-01 DIAGNOSIS — I47.20 VT (VENTRICULAR TACHYCARDIA): ICD-10-CM

## 2023-02-01 DIAGNOSIS — R73.9 HYPERGLYCEMIA: ICD-10-CM

## 2023-02-01 DIAGNOSIS — I95.1 ORTHOSTATIC HYPOTENSION: ICD-10-CM

## 2023-02-01 DIAGNOSIS — I10 ESSENTIAL HYPERTENSION: Primary | ICD-10-CM

## 2023-02-01 DIAGNOSIS — I71.21 ASCENDING AORTIC ANEURYSM, UNSPECIFIED WHETHER RUPTURED: ICD-10-CM

## 2023-02-01 DIAGNOSIS — R94.31 PROLONGED QT INTERVAL: ICD-10-CM

## 2023-02-01 DIAGNOSIS — I95.1 SYNCOPE DUE TO ORTHOSTATIC HYPOTENSION: ICD-10-CM

## 2023-02-01 PROBLEM — I71.9 AORTIC ANEURYSM: Status: ACTIVE | Noted: 2023-02-01

## 2023-02-01 PROCEDURE — 1159F MED LIST DOCD IN RCRD: CPT | Mod: HCNC,CPTII,S$GLB, | Performed by: INTERNAL MEDICINE

## 2023-02-01 PROCEDURE — 1160F RVW MEDS BY RX/DR IN RCRD: CPT | Mod: HCNC,CPTII,S$GLB, | Performed by: INTERNAL MEDICINE

## 2023-02-01 PROCEDURE — 1101F PT FALLS ASSESS-DOCD LE1/YR: CPT | Mod: HCNC,CPTII,S$GLB, | Performed by: INTERNAL MEDICINE

## 2023-02-01 PROCEDURE — 1160F PR REVIEW ALL MEDS BY PRESCRIBER/CLIN PHARMACIST DOCUMENTED: ICD-10-PCS | Mod: HCNC,CPTII,S$GLB, | Performed by: INTERNAL MEDICINE

## 2023-02-01 PROCEDURE — 3288F PR FALLS RISK ASSESSMENT DOCUMENTED: ICD-10-PCS | Mod: HCNC,CPTII,S$GLB, | Performed by: INTERNAL MEDICINE

## 2023-02-01 PROCEDURE — 1101F PR PT FALLS ASSESS DOC 0-1 FALLS W/OUT INJ PAST YR: ICD-10-PCS | Mod: HCNC,CPTII,S$GLB, | Performed by: INTERNAL MEDICINE

## 2023-02-01 PROCEDURE — 3288F FALL RISK ASSESSMENT DOCD: CPT | Mod: HCNC,CPTII,S$GLB, | Performed by: INTERNAL MEDICINE

## 2023-02-01 PROCEDURE — 99999 PR PBB SHADOW E&M-EST. PATIENT-LVL V: CPT | Mod: PBBFAC,HCNC,, | Performed by: INTERNAL MEDICINE

## 2023-02-01 PROCEDURE — 1126F PR PAIN SEVERITY QUANTIFIED, NO PAIN PRESENT: ICD-10-PCS | Mod: HCNC,CPTII,S$GLB, | Performed by: INTERNAL MEDICINE

## 2023-02-01 PROCEDURE — 99204 PR OFFICE/OUTPT VISIT, NEW, LEVL IV, 45-59 MIN: ICD-10-PCS | Mod: HCNC,S$GLB,, | Performed by: INTERNAL MEDICINE

## 2023-02-01 PROCEDURE — 3079F DIAST BP 80-89 MM HG: CPT | Mod: HCNC,CPTII,S$GLB, | Performed by: INTERNAL MEDICINE

## 2023-02-01 PROCEDURE — 3008F PR BODY MASS INDEX (BMI) DOCUMENTED: ICD-10-PCS | Mod: HCNC,CPTII,S$GLB, | Performed by: INTERNAL MEDICINE

## 2023-02-01 PROCEDURE — 1159F PR MEDICATION LIST DOCUMENTED IN MEDICAL RECORD: ICD-10-PCS | Mod: HCNC,CPTII,S$GLB, | Performed by: INTERNAL MEDICINE

## 2023-02-01 PROCEDURE — 3075F SYST BP GE 130 - 139MM HG: CPT | Mod: HCNC,CPTII,S$GLB, | Performed by: INTERNAL MEDICINE

## 2023-02-01 PROCEDURE — 99204 OFFICE O/P NEW MOD 45 MIN: CPT | Mod: HCNC,S$GLB,, | Performed by: INTERNAL MEDICINE

## 2023-02-01 PROCEDURE — 99999 PR PBB SHADOW E&M-EST. PATIENT-LVL V: ICD-10-PCS | Mod: PBBFAC,HCNC,, | Performed by: INTERNAL MEDICINE

## 2023-02-01 PROCEDURE — 3008F BODY MASS INDEX DOCD: CPT | Mod: HCNC,CPTII,S$GLB, | Performed by: INTERNAL MEDICINE

## 2023-02-01 PROCEDURE — 3079F PR MOST RECENT DIASTOLIC BLOOD PRESSURE 80-89 MM HG: ICD-10-PCS | Mod: HCNC,CPTII,S$GLB, | Performed by: INTERNAL MEDICINE

## 2023-02-01 PROCEDURE — 1126F AMNT PAIN NOTED NONE PRSNT: CPT | Mod: HCNC,CPTII,S$GLB, | Performed by: INTERNAL MEDICINE

## 2023-02-01 PROCEDURE — 3075F PR MOST RECENT SYSTOLIC BLOOD PRESS GE 130-139MM HG: ICD-10-PCS | Mod: HCNC,CPTII,S$GLB, | Performed by: INTERNAL MEDICINE

## 2023-02-01 RX ORDER — PRAVASTATIN SODIUM 80 MG/1
80 TABLET ORAL DAILY
Qty: 90 TABLET | Refills: 0 | Status: SHIPPED | OUTPATIENT
Start: 2023-02-01 | End: 2023-04-17

## 2023-02-01 NOTE — PROGRESS NOTES
"[facility] - Cardiology [address]  Cardiology Clinic Note      Chief Complaint  Chief Complaint   Patient presents with    Establish Care       HPI:    72-year-old male with a past medical history SBO, s/p colostomy, GERD, parotid mass s/p resection, BPH & prostate CA s/p TURP, CKD 4, pleural plaque, ("neurogenic") orthostatic hypotension, hypertension, hyperlipidemia, DM2, carotid stenosis no ultrasound could be found, ascending thoracic aorta 4.4 cm CT 2018, lower extremity arterial duplex 09/2022 no evidence of hemodynamically significant stenosis nonvisualization of the peroneal arteries prior SAKSHI 2019 normal, CAD status post PCI to mid LAD 2017 followed by cardiac catheterization 2021 patent stent nonobstructive disease prior MPI 2018 inferolateral ischemia, sustained ventricular tachycardia documented on discharge summary 4/2021 on amiodarone previous loop recorder implantation 2021 presumed link transmission showed several episodes of ventricular tachycardia with longest episode 36 beats also documented either atrial fibrillation RVR with aberrant conduction or AV radha reentry tachycardia (this was documented by an outside provider note scan 03/2021), echo 12/2022 normal EF mild LVH diastolic function indeterminate normal RV PASP 32+ CVP which could not be determined ascending aorta mildly dilated    The patient presents to establish care    Medications  Current Outpatient Medications   Medication Sig Dispense Refill    ACCU-CHEK PAUL CONTROL SOLN Soln 1 drop by NOT APPLICABLE route once daily.      ACCU-CHEK SOFTCLIX LANCETS Misc Accucheck tid 200 each 1    allopurinoL (ZYLOPRIM) 300 MG tablet Take 1.5 tablets (450 mg total) by mouth once daily. 135 tablet 3    ALPRAZolam (XANAX) 0.5 MG tablet Take 0.5 mg by mouth nightly as needed for Anxiety.      amiodarone (PACERONE) 200 MG Tab Take 1 tablet (200 mg total) by mouth 2 (two) times daily.      aspirin (ECOTRIN) 81 MG EC tablet Take 81 mg by mouth once " daily.      BD ALCOHOL SWABS PadM       blood sugar diagnostic (ONETOUCH ULTRA TEST) [facility] USE ONE STRIP TO CHECK GLUCOSE EVERY  each 11    blood-glucose meter (ACCU-CHEK GUIDE GLUCOSE METER) [facility] Accucheck BID 1 each 0    brimonidine 0.2% (ALPHAGAN) 0.2 % Drop INSTILL 1 DROP INTO EACH EYE TWICE DAILY 20 mL 0    calcium citrate-vitamin D3 (CITRACAL + D MAXIMUM) 315 mg-6.25 mcg (250 unit) Tab Take 1 tablet by mouth once daily. 120 tablet 3    carvediloL (COREG) 12.5 MG tablet Take 12.5 mg by mouth 2 (two) times daily.      clopidogrel (PLAVIX) 75 mg tablet Take 75 mg by mouth once daily.      droxidopa 100 mg Cap Take 2 capsules by mouth once daily. 400mg daily      droxidopa 300 mg Cap Take 600 mg by mouth. 2 tablets three times a day      FIBER, CALCIUM POLYCARBOPHIL, 625 mg tablet Take 3 tablets by mouth before dinner.      glimepiride (AMARYL) 4 MG tablet Take 4 mg by mouth 2 (two) times daily before meals.      pantoprazole (PROTONIX) 40 MG tablet Take 40 mg by mouth once daily.      pravastatin (PRAVACHOL) 80 MG tablet Take 1 tablet (80 mg total) by mouth once daily. 90 tablet 0     Current Facility-Administered Medications   Medication Dose Route Frequency Provider Last Rate Last Admin    leuprolide (6 month) injection 45 mg  45 mg Intramuscular Q6 Months Catarino Mota MD         Facility-Administered Medications Ordered in Other Visits   Medication Dose Route Frequency Provider Last Rate Last Admin    sodium chloride 0.9% in 1,000 mL infusion   Intravenous Continuous Paper Order            History  Past Medical History:   Diagnosis Date    Basal cell carcinoma     BPH (benign prostatic hyperplasia)     s/p TURP    Carotid stenosis     Chronic kidney disease     Claudication     Coronary artery disease     DDD (degenerative disc disease) 10/21/2013    Diabetes mellitus with renal complications     Disc disease, degenerative, cervical     Encounter for blood transfusion     GERD (gastroesophageal reflux  disease)     Gout, chronic     History of ulcerative colitis     s/p colectomy and ileostomy    HLD (hyperlipidemia)     HTN (hypertension)     Ileostomy in place 1982    RBBB     Squamous cell carcinoma 03/08/2018    Left superior helix near insertion    Squamous cell carcinoma 04/12/2018    Left forearm x 5    Ventricular tachycardia      Past Surgical History:   Procedure Laterality Date    cardiac stents      CATARACT EXTRACTION Bilateral     CERVICAL FUSION      colectomy and ileostomy  1985    EXCISION OF PAROTID GLAND Left 12/18/2020    Procedure: EXCISION, PAROTID GLAND;  Surgeon: Michael Pinzon MD;  Location: [facility] OR 2ND FLR;  Service: ENT;  Laterality: Left;    INSERTION OF IMPLANTABLE LOOP RECORDER  06/07/2021    INSERTION OF IMPLANTABLE LOOP RECORDER Left 6/7/2021    Procedure: INSERTION, IMPLANTABLE LOOP RECORDER;  Surgeon: Romario Dao MD;  Location: [facility] CATH LAB;  Service: Cardiology;  Laterality: Left;    LEFT HEART CATHETERIZATION Right 4/15/2021    Procedure: CATHETERIZATION, HEART, LEFT;  Surgeon: Marcio Jones MD;  Location: [facility] CATH LAB;  Service: Cardiology;  Laterality: Right;    LYSIS OF ADHESIONS N/A 11/9/2020    Procedure: LYSIS, ADHESIONS,  ERAS low;  Surgeon: CED Haley MD;  Location: [facility] OR 2ND FLR;  Service: Colon and Rectal;  Laterality: N/A;    POUCHOSCOPY N/A 4/6/2022    Procedure: ENDOSCOPY, POUCH, SMALL INTESTINE, DIAGNOSTIC;  Surgeon: Dieter Juarez MD;  Location: [facility] ENDO (2ND FLR);  Service: Endoscopy;  Laterality: N/A;    REPAIR, HERNIA, PARASTOMAL N/A 11/9/2020    Procedure: REPAIR, HERNIA, PARASTOMAL;  Surgeon: CED Haley MD;  Location: [facility] OR 2ND FLR;  Service: Colon and Rectal;  Laterality: N/A;    TRANSURETHRAL RESECTION OF PROSTATE (TURP) WITHOUT USE OF LASER N/A 1/23/2019    Procedure: TURP, WITHOUT USING LASER BIPOLAR;  Surgeon: Catarino Mota MD;  Location: [facility] OR 1ST FLR;  Service: Urology;  Laterality: N/A;  1.5 HOURS      Social History     Socioeconomic History    Marital status:     Number of children: 1   Occupational History    Occupation:  x 44 years     Comment: Retired    Occupation: [place]    Tobacco Use    Smoking status: Never    Smokeless tobacco: Never   Substance and Sexual Activity    Alcohol use: No    Drug use: No    Sexual activity: Not Currently     Partners: Female     Social Determinants of Health     Financial Resource Strain: Low Risk     Difficulty of Paying Living Expenses: Not hard at all   Food Insecurity: No Food Insecurity    Worried About Running Out of Food in the Last Year: Never true    Ran Out of Food in the Last Year: Never true   Transportation Needs: No Transportation Needs    Lack of Transportation (Medical): No    Lack of Transportation (Non-Medical): No   Physical Activity: Inactive    Days of Exercise per Week: 0 days    Minutes of Exercise per Session: 0 min   Stress: Stress Concern Present    Feeling of Stress : To some extent   Social Connections: Socially Isolated    Frequency of Communication with Friends and Family: More than three times a week    Frequency of Social Gatherings with Friends and Family: More than three times a week    Attends Bahai Services: Never    Active Member of Clubs or Organizations: No    Attends Club or Organization Meetings: Never    Marital Status:    Housing Stability: Low Risk     Unable to Pay for Housing in the Last Year: No    Number of Places Lived in the Last Year: 1    Unstable Housing in the Last Year: No     Family History   Problem Relation Age of Onset    Cancer Father     Diabetes Father     Dementia Mother     Melanoma Neg Hx     Hypertension Neg Hx     Arthritis Neg Hx         Allergies  Review of patient's allergies indicates:   Allergen Reactions    Crestor [rosuvastatin]     Lipitor [atorvastatin]        Review of Systems   Review of Systems   Constitutional: Negative for fever.   HENT:   Negative for nosebleeds.    Eyes:  Negative for visual disturbance.   Cardiovascular:  Negative for chest pain, claudication, dyspnea on exertion, palpitations and syncope.   Respiratory:  Negative for cough, hemoptysis and wheezing.    Endocrine: Negative for cold intolerance, heat intolerance, polyphagia and polyuria.   Hematologic/Lymphatic: Negative for bleeding problem.   Skin:  Negative for rash.   Musculoskeletal:  Negative for myalgias.   Gastrointestinal:  Negative for hematemesis, hematochezia, nausea and vomiting.   Genitourinary:  Negative for dysuria.   Neurological:  Negative for focal weakness and sensory change.   Psychiatric/Behavioral:  Negative for altered mental status.      Physical Exam  Vitals:    02/01/23 1256   BP: 138/86   Pulse: 68     Wt Readings from Last 1 Encounters:   02/01/23 96.5 kg (212 lb 11.9 oz)     Physical Exam  HENT:      Head: Normocephalic and atraumatic.      Mouth/Throat:      Mouth: Mucous membranes are moist.   Eyes:      Extraocular Movements: Extraocular movements intact.      Pupils: Pupils are equal, round, and reactive to light.   Neck:      Vascular: No carotid bruit or JVD.   Cardiovascular:      Rate and Rhythm: Normal rate and regular rhythm.      Pulses: Normal pulses and intact distal pulses.      Heart sounds: Normal heart sounds. No murmur heard.    No friction rub. No gallop.   Pulmonary:      Effort: Pulmonary effort is normal.      Breath sounds: Normal breath sounds.   Abdominal:      Tenderness: There is no abdominal tenderness. There is no guarding or rebound.   Musculoskeletal:      Right lower leg: No edema.      Left lower leg: No edema.   Skin:     General: Skin is warm and dry.      Capillary Refill: Capillary refill takes less than 2 seconds.   Neurological:      General: No focal deficit present.      Mental Status: He is alert and oriented to person, place, and time.   Psychiatric:         Mood and Affect: Mood normal.       Labs  Lab Visit on  01/31/2023   Component Date Value Ref Range Status    Chloride, Urine 01/31/2023 73  25 - 200 mmol/L Final    Comment: The random urine reference ranges provided were established   for 24 hour urine collections.  No reference ranges exist for  random urine specimens.  Correlate clinically.      Osmolality, Urine 01/31/2023 373  50 - 1200 mOsm/kg Final    Comment: The random urine reference ranges provided were established   for 24 hour urine collections.  No reference ranges exist for  random urine specimens.  Correlate clinically.      Specimen UA 01/31/2023 Urine, Clean Catch   Final    Color, UA 01/31/2023 Colorless (A)  Yellow, Straw, Stacy Final    Appearance, UA 01/31/2023 Clear  Clear Final    pH, UA 01/31/2023 6.0  5.0 - 8.0 Final    Specific Gravity, UA 01/31/2023 1.010  1.005 - 1.030 Final    Protein, UA 01/31/2023 Negative  Negative Final    Comment: Recommend a 24 hour urine protein or a urine   protein/creatinine ratio if globulin induced proteinuria is  clinically suspected.      Glucose, UA 01/31/2023 1+ (A)  Negative Final    Ketones, UA 01/31/2023 Negative  Negative Final    Bilirubin (UA) 01/31/2023 Negative  Negative Final    Occult Blood UA 01/31/2023 Negative  Negative Final    Nitrite, UA 01/31/2023 Negative  Negative Final    Urobilinogen, UA 01/31/2023 Negative  Negative EU/dL Final    Leukocytes, UA 01/31/2023 Negative  Negative Final    Potassium, Urine 01/31/2023 32  15 - 95 mmol/L Final    Comment: The random urine reference ranges provided were established   for 24 hour urine collections.  No reference ranges exist for  random urine specimens.  Correlate clinically.     Lab Visit on 01/30/2023   Component Date Value Ref Range Status    Protein, Urine Random 01/31/2023 9  0 - 15 mg/dL Final    Creatinine, Urine 01/31/2023 54.0  23.0 - 375.0 mg/dL Final    Prot/Creat Ratio, Urine 01/31/2023 0.17  0.00 - 0.20 Final   Lab Visit on 01/30/2023   Component Date Value Ref Range Status    PTH,  Intact 01/30/2023 152.6 (H)  9.0 - 77.0 pg/mL Final    Vit D, 25-Hydroxy 01/30/2023 32  30 - 96 ng/mL Final    Comment: Vitamin D deficiency.........<10 ng/mL                              Vitamin D insufficiency......10-29 ng/mL       Vitamin D sufficiency........> or equal to 30 ng/mL  Vitamin D toxicity............>100 ng/mL      Glucose 01/30/2023 186 (H)  70 - 110 mg/dL Final    Sodium 01/30/2023 142  136 - 145 mmol/L Final    Potassium 01/30/2023 3.7  3.5 - 5.1 mmol/L Final    Chloride 01/30/2023 104  95 - 110 mmol/L Final    CO2 01/30/2023 25  23 - 29 mmol/L Final    BUN 01/30/2023 41 (H)  8 - 23 mg/dL Final    Calcium 01/30/2023 9.5  8.7 - 10.5 mg/dL Final    Creatinine 01/30/2023 2.9 (H)  0.5 - 1.4 mg/dL Final    Albumin 01/30/2023 3.5  3.5 - 5.2 g/dL Final    Phosphorus 01/30/2023 4.2  2.7 - 4.5 mg/dL Final    eGFR 01/30/2023 22.1 (A)  >60 mL/min/1.73 m^2 Final    Anion Gap 01/30/2023 13  8 - 16 mmol/L Final    WBC 01/30/2023 6.01  3.90 - 12.70 K/uL Final    RBC 01/30/2023 4.52 (L)  4.60 - 6.20 M/uL Final    Hemoglobin 01/30/2023 13.1 (L)  14.0 - 18.0 g/dL Final    Hematocrit 01/30/2023 40.7  40.0 - 54.0 % Final    MCV 01/30/2023 90  82 - 98 fL Final    MCH 01/30/2023 29.0  27.0 - 31.0 pg Final    MCHC 01/30/2023 32.2  32.0 - 36.0 g/dL Final    RDW 01/30/2023 14.5  11.5 - 14.5 % Final    Platelets 01/30/2023 205  150 - 450 K/uL Final    MPV 01/30/2023 11.0  9.2 - 12.9 fL Final    Immature Granulocytes 01/30/2023 0.3  0.0 - 0.5 % Final    Gran # (ANC) 01/30/2023 3.6  1.8 - 7.7 K/uL Final    Immature Grans (Abs) 01/30/2023 0.02  0.00 - 0.04 K/uL Final    Comment: Mild elevation in immature granulocytes is non specific and   can be seen in a variety of conditions including stress response,   acute inflammation, trauma and pregnancy. Correlation with other   laboratory and clinical findings is essential.      Lymph # 01/30/2023 1.9  1.0 - 4.8 K/uL Final    Mono # 01/30/2023 0.4  0.3 - 1.0 K/uL Final    Eos #  01/30/2023 0.2  0.0 - 0.5 K/uL Final    Baso # 01/30/2023 0.03  0.00 - 0.20 K/uL Final    nRBC 01/30/2023 0  0 /100 WBC Final    Gran % 01/30/2023 59.3  38.0 - 73.0 % Final    Lymph % 01/30/2023 31.3  18.0 - 48.0 % Final    Mono % 01/30/2023 5.8  4.0 - 15.0 % Final    Eosinophil % 01/30/2023 2.8  0.0 - 8.0 % Final    Basophil % 01/30/2023 0.5  0.0 - 1.9 % Final    Differential Method 01/30/2023 Automated   Final   Lab Visit on 01/27/2023   Component Date Value Ref Range Status    Urine Volume 01/27/2023 2900  mL Final    Urine Collection Duration 01/27/2023 24  Hr Final    Creatinine, Urine 01/27/2023 50.0  23.0 - 375.0 mg/dL Final    Creatinine, Timed Urine 01/27/2023 60.4  40.0 - 75.0 mg/Hr Final    Creatinine, Urinr (mg/spec) 01/27/2023 1450.0  mg/Spec Final    Urine Volume 01/27/2023 2900  mL Final    Urine Collection Duration 01/27/2023 24  Hr Final    Sodium, Urine 01/27/2023 81  20 - 250 mmol/L Final    Sodium, Timed Urine 01/27/2023 9.8 (H)  2.0 - 9.0 mmol/Hr Final    Sodium, Urine (mmol/spec) 01/27/2023 235  mmol/Spec Final    Urine Volume 01/27/2023 2900  mL Final    Urine Collection Duration 01/27/2023 24  Hr Final    Urine Potassium 01/27/2023 30  15 - 95 mmol/L Final    Potassium, 24 Hr Urine 01/27/2023 3.6  1.0 - 5.0 mmol/Hr Final    Potassium, Urine (mmol/spec) 01/27/2023 87.0  mmol/Spec Final    [facility] Miscellaneous Test 1 01/27/2023 SEE NOTE   Final    Comment: Test name                    Result Flag  Units  RefIntvl  ------------------------------------------------------------  Glucose, Urine - per volume  261       mg/dL             Performed At: [facility]  [place] [address]  Medical Director: DO NELDA BATES Number: [id number]  Glucose, Urine - per 24h  7569        mg/d             Performed At: [facility]  [place] [address]  Medical Director: BHARTI SOTOMAYOR  DO WILLIAMS  CLIA Number: [id number]  Urine Creatinine - mg per dL  47       mg/dL             Performed At: [facility]  [place] [address]  Medical Director: BHARTI KRAMER DO  CLIA Number: [id number]  Urine Creatinine - per 24 hour  1363        mg/d             Performed At: [place] [address]  [facility] [employer]                           ctor: BHARTI KRAMER DO  CLIA Number: [id number]  Urine Total Volume  2900          mL             Performed At: [facility] [place] [address]  Medical Director: BHARTI KRAMER DO  CLIA Number: [id number]  Collected Hours                  24          hr  Performed At: [place] [address]  Medical Director: BHARTI KRAMER DO  CLIA Number: [id number]     Lab Visit on 01/13/2023   Component Date Value Ref Range Status    Osmolality 01/13/2023 313 (H)  280 - 300 mOsm/kg Final   Lab Visit on 01/13/2023   Component Date Value Ref Range Status    Sodium, Urine 01/13/2023 32  20 - 250 mmol/L Final    Comment: The random urine reference ranges provided were established   for 24 hour urine collections.  No reference ranges exist for  random urine specimens.  Correlate clinically.     Lab Visit on 01/03/2023   Component Date Value Ref Range Status    WBC 01/03/2023 6.94  3.90 - 12.70 K/uL Final    RBC 01/03/2023 4.38 (L)  4.60 - 6.20 M/uL Final    Hemoglobin 01/03/2023 12.6 (L)  14.0 - 18.0 g/dL Final    Hematocrit 01/03/2023 39.1 (L)  40.0 - 54.0 % Final    MCV 01/03/2023 89  82 - 98 fL Final    MCH 01/03/2023 28.8  27.0 - 31.0 pg Final    MCHC 01/03/2023 32.2  32.0 - 36.0 g/dL Final    RDW 01/03/2023 14.2  11.5 - 14.5 % Final    Platelets 01/03/2023 201  150 - 450 K/uL Final    MPV 01/03/2023 10.7   9.2 - 12.9 fL Final    Immature Granulocytes 01/03/2023 0.3  0.0 - 0.5 % Final    Gran # (ANC) 01/03/2023 4.9  1.8 - 7.7 K/uL Final    Immature Grans (Abs) 01/03/2023 0.02  0.00 - 0.04 K/uL Final    Comment: Mild elevation in immature granulocytes is non specific and   can be seen in a variety of conditions including stress response,   acute inflammation, trauma and pregnancy. Correlation with other   laboratory and clinical findings is essential.      Lymph # 01/03/2023 1.4  1.0 - 4.8 K/uL Final    Mono # 01/03/2023 0.4  0.3 - 1.0 K/uL Final    Eos # 01/03/2023 0.2  0.0 - 0.5 K/uL Final    Baso # 01/03/2023 0.04  0.00 - 0.20 K/uL Final    nRBC 01/03/2023 0  0 /100 WBC Final    Gran % 01/03/2023 70.2  38.0 - 73.0 % Final    Lymph % 01/03/2023 20.5  18.0 - 48.0 % Final    Mono % 01/03/2023 6.1  4.0 - 15.0 % Final    Eosinophil % 01/03/2023 2.3  0.0 - 8.0 % Final    Basophil % 01/03/2023 0.6  0.0 - 1.9 % Final    Differential Method 01/03/2023 Automated   Final    Sodium 01/03/2023 143  136 - 145 mmol/L Final    Potassium 01/03/2023 3.7  3.5 - 5.1 mmol/L Final    Chloride 01/03/2023 104  95 - 110 mmol/L Final    CO2 01/03/2023 27  23 - 29 mmol/L Final    Glucose 01/03/2023 158 (H)  70 - 110 mg/dL Final    BUN 01/03/2023 35 (H)  8 - 23 mg/dL Final    Creatinine 01/03/2023 3.1 (H)  0.5 - 1.4 mg/dL Final    Calcium 01/03/2023 9.1  8.7 - 10.5 mg/dL Final    Total Protein 01/03/2023 7.1  6.0 - 8.4 g/dL Final    Albumin 01/03/2023 3.5  3.5 - 5.2 g/dL Final    Total Bilirubin 01/03/2023 0.8  0.1 - 1.0 mg/dL Final    Comment: For infants and newborns, interpretation of results should be based  on gestational age, weight and in agreement with clinical  observations.    Premature Infant recommended reference ranges:  Up to 24 hours.............<8.0 mg/dL  Up to 48 hours............<12.0 mg/dL  3-5 days..................<15.0 mg/dL  6-29 days.................<15.0 mg/dL      Alkaline Phosphatase 01/03/2023 131  55 - 135 U/L  Final    AST 01/03/2023 35  10 - 40 U/L Final    ALT 01/03/2023 31  10 - 44 U/L Final    Anion Gap 01/03/2023 12  8 - 16 mmol/L Final    eGFR 01/03/2023 20.4 (A)  >60 mL/min/1.73 m^2 Final   Admission on 12/15/2022, Discharged on 12/15/2022   Component Date Value Ref Range Status    WBC 12/15/2022 6.66  3.90 - 12.70 K/uL Final    RBC 12/15/2022 4.22 (L)  4.60 - 6.20 M/uL Final    Hemoglobin 12/15/2022 12.4 (L)  14.0 - 18.0 g/dL Final    Hematocrit 12/15/2022 37.3 (L)  40.0 - 54.0 % Final    MCV 12/15/2022 88  82 - 98 fL Final    MCH 12/15/2022 29.4  27.0 - 31.0 pg Final    MCHC 12/15/2022 33.2  32.0 - 36.0 g/dL Final    RDW 12/15/2022 13.9  11.5 - 14.5 % Final    Platelets 12/15/2022 188  150 - 450 K/uL Final    MPV 12/15/2022 10.4  9.2 - 12.9 fL Final    Immature Granulocytes 12/15/2022 0.3  0.0 - 0.5 % Final    Gran # (ANC) 12/15/2022 4.3  1.8 - 7.7 K/uL Final    Immature Grans (Abs) 12/15/2022 0.02  0.00 - 0.04 K/uL Final    Comment: Mild elevation in immature granulocytes is non specific and   can be seen in a variety of conditions including stress response,   acute inflammation, trauma and pregnancy. Correlation with other   laboratory and clinical findings is essential.      Lymph # 12/15/2022 1.7  1.0 - 4.8 K/uL Final    Mono # 12/15/2022 0.5  0.3 - 1.0 K/uL Final    Eos # 12/15/2022 0.2  0.0 - 0.5 K/uL Final    Baso # 12/15/2022 0.04  0.00 - 0.20 K/uL Final    nRBC 12/15/2022 0  0 /100 WBC Final    Gran % 12/15/2022 64.1  38.0 - 73.0 % Final    Lymph % 12/15/2022 25.8  18.0 - 48.0 % Final    Mono % 12/15/2022 6.8  4.0 - 15.0 % Final    Eosinophil % 12/15/2022 2.4  0.0 - 8.0 % Final    Basophil % 12/15/2022 0.6  0.0 - 1.9 % Final    Differential Method 12/15/2022 Automated   Final    Sodium 12/15/2022 140  136 - 145 mmol/L Final    Potassium 12/15/2022 3.4 (L)  3.5 - 5.1 mmol/L Final    Chloride 12/15/2022 103  95 - 110 mmol/L Final    CO2 12/15/2022 23  23 - 29 mmol/L Final    Glucose 12/15/2022 103  70 -  110 mg/dL Final    BUN 12/15/2022 35 (H)  8 - 23 mg/dL Final    Creatinine 12/15/2022 3.3 (H)  0.5 - 1.4 mg/dL Final    Calcium 12/15/2022 8.9  8.7 - 10.5 mg/dL Final    Total Protein 12/15/2022 7.3  6.0 - 8.4 g/dL Final    Albumin 12/15/2022 3.6  3.5 - 5.2 g/dL Final    Total Bilirubin 12/15/2022 0.6  0.1 - 1.0 mg/dL Final    Comment: For infants and newborns, interpretation of results should be based  on gestational age, weight and in agreement with clinical  observations.    Premature Infant recommended reference ranges:  Up to 24 hours.............<8.0 mg/dL  Up to 48 hours............<12.0 mg/dL  3-5 days..................<15.0 mg/dL  6-29 days.................<15.0 mg/dL      Alkaline Phosphatase 12/15/2022 138 (H)  55 - 135 U/L Final    AST 12/15/2022 33  10 - 40 U/L Final    ALT 12/15/2022 30  10 - 44 U/L Final    Anion Gap 12/15/2022 14  8 - 16 mmol/L Final    eGFR 12/15/2022 19.0 (A)  >60 mL/min/1.73 m^2 Final    Prothrombin Time 12/15/2022 11.1  9.0 - 12.5 sec Final    INR 12/15/2022 1.0  0.8 - 1.2 Final    Comment: Coumadin Therapy:  2.0 - 3.0 for INR for all indicators except mechanical heart valves  and antiphospholipid syndromes which should use 2.5 - 3.5.  LOT^040^PT Inn^[id number]      Troponin I 12/15/2022 0.008  0.000 - 0.026 ng/mL Final    Comment: The reference interval for Troponin I represents the 99th percentile   cutoff   for our facility and is consistent with 3rd generation assay   performance.      BSA 12/16/2022 2.26  m2 Final    TDI SEPTAL 12/16/2022 0.06  m/s Final    LV LATERAL E/E' RATIO 12/16/2022 3.36  m/s Final    LV SEPTAL E/E' RATIO 12/16/2022 6.17  m/s Final    Left Ventricular Outflow Tract Mariana* 12/16/2022 0.74  cm/s Final    Left Ventricular Outflow Tract Mariana* 12/16/2022 2.35  mmHg Final    AORTIC VALVE CUSP SEPERATION 12/16/2022 1.69  cm Final    TDI LATERAL 12/16/2022 0.11  m/s Final    PV PEAK VELOCITY 12/16/2022 1.15  cm/s Final    LVIDd 12/16/2022 5.13  3.5 - 6.0 cm Final     IVS 12/16/2022 1.31 (A)  0.6 - 1.1 cm Final    Posterior Wall 12/16/2022 1.32 (A)  0.6 - 1.1 cm Final    Ao root annulus 12/16/2022 3.57  cm Final    LVIDs 12/16/2022 4.01 (A)  2.1 - 4.0 cm Final    FS 12/16/2022 22  28 - 44 % Final    LV mass 12/16/2022 277.06  g Final    RVDD 12/16/2022 3.24  cm Final    Left Ventricle Relative Wall Thick* 12/16/2022 0.51  cm Final    AV Velocity Ratio 12/16/2022 0.71   Final    MV valve area p 1/2 method 12/16/2022 2.76  cm2 Final    E/A ratio 12/16/2022 0.56   Final    Mean e' 12/16/2022 0.09  m/s Final    E wave deceleration time 12/16/2022 274.49  msec Final    IVRT 12/16/2022 72.31  msec Final    LVOT diameter 12/16/2022 1.94  cm Final    LVOT area 12/16/2022 3.0  cm2 Final    LVOT peak joao 12/16/2022 0.97  m/s Final    LVOT peak VTI 12/16/2022 23.20  cm Final    Ao peak joao 12/16/2022 1.36  m/s Final    LVOT stroke volume 12/16/2022 68.54  cm3 Final    AV peak gradient 12/16/2022 7  mmHg Final    E/E' ratio 12/16/2022 4.35  m/s Final    MV Peak E Joao 12/16/2022 0.37  m/s Final    TR Max Joao 12/16/2022 2.83  m/s Final    MV stenosis pressure 1/2 time 12/16/2022 79.60  ms Final    MV Peak A Joao 12/16/2022 0.66  m/s Final    LV Systolic Volume 12/16/2022 70.32  mL Final    LV Systolic Volume Index 12/16/2022 31.3  mL/m2 Final    LV Diastolic Volume 12/16/2022 125.27  mL Final    LV Diastolic Volume Index 12/16/2022 55.68  mL/m2 Final    LV Mass Index 12/16/2022 123  g/m2 Final    RA Major [place] 12/16/2022 5.09  cm Final    Left Atrium Minor Axis 12/16/2022 3.29  cm Final    Left Atrium Major Axis 12/16/2022 6.12  cm Final    Triscuspid Valve Regurgitation Pe[place]* 12/16/2022 32  mmHg Final    LA Volume Index (Mod) 12/16/2022 22.2  mL/m2 Final    LA volume (mod) 12/16/2022 49.99  cm3 Final    RA Width 12/16/2022 2.10  cm Final    EF 12/16/2022 60  % Final    Proximal aorta 12/16/2022 3.60  cm Final   Lab Visit on 12/09/2022   Component Date Value Ref Range Status    Sodium 12/09/2022  143  136 - 145 mmol/L Final    Potassium 12/09/2022 4.1  3.5 - 5.1 mmol/L Final    Chloride 12/09/2022 108  95 - 110 mmol/L Final    CO2 12/09/2022 23  23 - 29 mmol/L Final    Glucose 12/09/2022 207 (H)  70 - 110 mg/dL Final    BUN 12/09/2022 33 (H)  8 - 23 mg/dL Final    Creatinine 12/09/2022 2.8 (H)  0.5 - 1.4 mg/dL Final    Calcium 12/09/2022 8.3 (L)  8.7 - 10.5 mg/dL Final    Anion Gap 12/09/2022 12  8 - 16 mmol/L Final    eGFR 12/09/2022 23.1 (A)  >60 mL/min/1.73 m^2 Final   Admission on 12/06/2022, Discharged on 12/07/2022   Component Date Value Ref Range Status    WBC 12/06/2022 6.47  3.90 - 12.70 K/uL Final    RBC 12/06/2022 4.52 (L)  4.60 - 6.20 M/uL Final    Hemoglobin 12/06/2022 13.1 (L)  14.0 - 18.0 g/dL Final    Hematocrit 12/06/2022 40.1  40.0 - 54.0 % Final    MCV 12/06/2022 89  82 - 98 fL Final    MCH 12/06/2022 29.0  27.0 - 31.0 pg Final    MCHC 12/06/2022 32.7  32.0 - 36.0 g/dL Final    RDW 12/06/2022 13.6  11.5 - 14.5 % Final    Platelets 12/06/2022 211  150 - 450 K/uL Final    MPV 12/06/2022 10.6  9.2 - 12.9 fL Final    Immature Granulocytes 12/06/2022 0.2  0.0 - 0.5 % Final    Gran # (ANC) 12/06/2022 4.7  1.8 - 7.7 K/uL Final    Immature Grans (Abs) 12/06/2022 0.01  0.00 - 0.04 K/uL Final    Comment: Mild elevation in immature granulocytes is non specific and   can be seen in a variety of conditions including stress response,   acute inflammation, trauma and pregnancy. Correlation with other   laboratory and clinical findings is essential.      Lymph # 12/06/2022 1.2  1.0 - 4.8 K/uL Final    Mono # 12/06/2022 0.4  0.3 - 1.0 K/uL Final    Eos # 12/06/2022 0.1  0.0 - 0.5 K/uL Final    Baso # 12/06/2022 0.02  0.00 - 0.20 K/uL Final    nRBC 12/06/2022 0  0 /100 WBC Final    Gran % 12/06/2022 72.3  38.0 - 73.0 % Final    Lymph % 12/06/2022 18.7  18.0 - 48.0 % Final    Mono % 12/06/2022 6.3  4.0 - 15.0 % Final    Eosinophil % 12/06/2022 2.2  0.0 - 8.0 % Final    Basophil % 12/06/2022 0.3  0.0 - 1.9 %  Final    Differential Method 12/06/2022 Automated   Final    Sodium 12/06/2022 140  136 - 145 mmol/L Final    Potassium 12/06/2022 3.5  3.5 - 5.1 mmol/L Final    Chloride 12/06/2022 101  95 - 110 mmol/L Final    CO2 12/06/2022 23  23 - 29 mmol/L Final    Glucose 12/06/2022 165 (H)  70 - 110 mg/dL Final    BUN 12/06/2022 41 (H)  8 - 23 mg/dL Final    Creatinine 12/06/2022 2.9 (H)  0.5 - 1.4 mg/dL Final    Calcium 12/06/2022 9.0  8.7 - 10.5 mg/dL Final    Total Protein 12/06/2022 7.3  6.0 - 8.4 g/dL Final    Albumin 12/06/2022 3.5  3.5 - 5.2 g/dL Final    Total Bilirubin 12/06/2022 0.6  0.1 - 1.0 mg/dL Final    Comment: For infants and newborns, interpretation of results should be based  on gestational age, weight and in agreement with clinical  observations.    Premature Infant recommended reference ranges:  Up to 24 hours.............<8.0 mg/dL  Up to 48 hours............<12.0 mg/dL  3-5 days..................<15.0 mg/dL  6-29 days.................<15.0 mg/dL      Alkaline Phosphatase 12/06/2022 128  55 - 135 U/L Final    AST 12/06/2022 42 (H)  10 - 40 U/L Final    ALT 12/06/2022 33  10 - 44 U/L Final    Anion Gap 12/06/2022 16  8 - 16 mmol/L Final    eGFR 12/06/2022 22.1 (A)  >60 mL/min/1.73 m^2 Final    Troponin I 12/06/2022 0.013  0.000 - 0.026 ng/mL Final    Comment: The reference interval for Troponin I represents the 99th percentile   cutoff   for our facility and is consistent with 3rd generation assay   performance.      BNP 12/06/2022 32  0 - 99 pg/mL Final    Values of less than 100 pg/ml are consistent with non-CHF populations.    POCT Glucose 12/06/2022 136 (H)  70 - 110 mg/dL Final    Troponin I 12/06/2022 0.008  0.000 - 0.026 ng/mL Final    Comment: The reference interval for Troponin I represents the 99th percentile   cutoff   for our facility and is consistent with 3rd generation assay   performance.      Troponin I 12/06/2022 0.015  0.000 - 0.026 ng/mL Final    Comment: The reference interval for  Troponin I represents the 99th percentile   cutoff   for our facility and is consistent with 3rd generation assay   performance.      Hemoglobin A1C 12/06/2022 7.0 (H)  4.0 - 5.6 % Final    Comment: ADA Screening Guidelines:  5.7-6.4%  Consistent with prediabetes  >or=6.5%  Consistent with diabetes    High levels of fetal hemoglobin interfere with the HbA1C  assay. Heterozygous hemoglobin variants (HbS, HgC, etc)do  not significantly interfere with this assay.   However, presence of multiple variants may affect accuracy.      Estimated Avg Glucose 12/06/2022 154 (H)  68 - 131 mg/dL Final    POCT Glucose 12/06/2022 103  70 - 110 mg/dL Final    POCT Glucose 12/06/2022 143 (H)  70 - 110 mg/dL Final    WBC 12/07/2022 5.83  3.90 - 12.70 K/uL Final    RBC 12/07/2022 4.17 (L)  4.60 - 6.20 M/uL Final    Hemoglobin 12/07/2022 12.0 (L)  14.0 - 18.0 g/dL Final    Hematocrit 12/07/2022 37.4 (L)  40.0 - 54.0 % Final    MCV 12/07/2022 90  82 - 98 fL Final    MCH 12/07/2022 28.8  27.0 - 31.0 pg Final    MCHC 12/07/2022 32.1  32.0 - 36.0 g/dL Final    RDW 12/07/2022 13.5  11.5 - 14.5 % Final    Platelets 12/07/2022 199  150 - 450 K/uL Final    MPV 12/07/2022 10.7  9.2 - 12.9 fL Final    Immature Granulocytes 12/07/2022 0.2  0.0 - 0.5 % Final    Gran # (ANC) 12/07/2022 3.8  1.8 - 7.7 K/uL Final    Immature Grans (Abs) 12/07/2022 0.01  0.00 - 0.04 K/uL Final    Comment: Mild elevation in immature granulocytes is non specific and   can be seen in a variety of conditions including stress response,   acute inflammation, trauma and pregnancy. Correlation with other   laboratory and clinical findings is essential.      Lymph # 12/07/2022 1.6  1.0 - 4.8 K/uL Final    Mono # 12/07/2022 0.4  0.3 - 1.0 K/uL Final    Eos # 12/07/2022 0.1  0.0 - 0.5 K/uL Final    Baso # 12/07/2022 0.02  0.00 - 0.20 K/uL Final    nRBC 12/07/2022 0  0 /100 WBC Final    Gran % 12/07/2022 64.5  38.0 - 73.0 % Final    Lymph % 12/07/2022 26.8  18.0 - 48.0 % Final     Mono % 12/07/2022 6.3  4.0 - 15.0 % Final    Eosinophil % 12/07/2022 1.9  0.0 - 8.0 % Final    Basophil % 12/07/2022 0.3  0.0 - 1.9 % Final    Differential Method 12/07/2022 Automated   Final    Sodium 12/07/2022 142  136 - 145 mmol/L Final    Potassium 12/07/2022 3.4 (L)  3.5 - 5.1 mmol/L Final    Chloride 12/07/2022 102  95 - 110 mmol/L Final    CO2 12/07/2022 24  23 - 29 mmol/L Final    Glucose 12/07/2022 121 (H)  70 - 110 mg/dL Final    BUN 12/07/2022 42 (H)  8 - 23 mg/dL Final    Creatinine 12/07/2022 2.7 (H)  0.5 - 1.4 mg/dL Final    Calcium 12/07/2022 8.6 (L)  8.7 - 10.5 mg/dL Final    Total Protein 12/07/2022 6.7  6.0 - 8.4 g/dL Final    Albumin 12/07/2022 3.3 (L)  3.5 - 5.2 g/dL Final    Total Bilirubin 12/07/2022 0.5  0.1 - 1.0 mg/dL Final    Comment: For infants and newborns, interpretation of results should be based  on gestational age, weight and in agreement with clinical  observations.    Premature Infant recommended reference ranges:  Up to 24 hours.............<8.0 mg/dL  Up to 48 hours............<12.0 mg/dL  3-5 days..................<15.0 mg/dL  6-29 days.................<15.0 mg/dL      Alkaline Phosphatase 12/07/2022 115  55 - 135 U/L Final    AST 12/07/2022 36  10 - 40 U/L Final    ALT 12/07/2022 29  10 - 44 U/L Final    Anion Gap 12/07/2022 16  8 - 16 mmol/L Final    eGFR 12/07/2022 24.1 (A)  >60 mL/min/1.73 m^2 Final    POCT Glucose 12/07/2022 106  70 - 110 mg/dL Final   There may be more visits with results that are not included.       EKG  12/2022 normal sinus rhythm normal rate borderline NH interval right bundle-branch block LAFB prolonged QT    Echo   Results for orders placed or performed during the hospital encounter of 12/15/22   Echo   Result Value Ref Range    BSA 2.26 m2    TDI SEPTAL 0.06 m/s    LV LATERAL E/E' RATIO 3.36 m/s    LV SEPTAL E/E' RATIO 6.17 m/s    Left Ventricular Outflow Tract Mean Velocity 0.74 cm/s    Left Ventricular Outflow Tract Mean Gradient 2.35 mmHg     AORTIC VALVE CUSP SEPERATION 1.69 cm    TDI LATERAL 0.11 m/s    PV PEAK VELOCITY 1.15 cm/s    LVIDd 5.13 3.5 - 6.0 cm    IVS 1.31 (A) 0.6 - 1.1 cm    Posterior Wall 1.32 (A) 0.6 - 1.1 cm    Ao root annulus 3.57 cm    LVIDs 4.01 (A) 2.1 - 4.0 cm    FS 22 28 - 44 %    LV mass 277.06 g    RVDD 3.24 cm    Left Ventricle Relative Wall Thickness 0.51 cm    AV Velocity Ratio 0.71     MV valve area p 1/2 method 2.76 cm2    E/A ratio 0.56     Mean e' 0.09 m/s    E wave deceleration time 274.49 msec    IVRT 72.31 msec    LVOT diameter 1.94 cm    LVOT area 3.0 cm2    LVOT peak joao 0.97 m/s    LVOT peak VTI 23.20 cm    Ao peak joao 1.36 m/s    LVOT stroke volume 68.54 cm3    AV peak gradient 7 mmHg    E/E' ratio 4.35 m/s    MV Peak E Joao 0.37 m/s    TR Max Joao 2.83 m/s    MV stenosis pressure 1/2 time 79.60 ms    MV Peak A Joao 0.66 m/s    LV Systolic Volume 70.32 mL    LV Systolic Volume Index 31.3 mL/m2    LV Diastolic Volume 125.27 mL    LV Diastolic Volume Index 55.68 mL/m2    LV Mass Index 123 g/m2    RA Major Axis 5.09 cm    Left Atrium Minor Axis 3.29 cm    Left Atrium Major Axis 6.12 cm    Triscuspid Valve Regurgitation Peak Gradient 32 mmHg    LA Volume Index (Mod) 22.2 mL/m2    LA volume (mod) 49.99 cm3    RA Width 2.10 cm    EF 60 %    Proximal aorta 3.60 cm    Narrative    · The left ventricle is normal in size with mild concentric hypertrophy   and normal systolic function.  · The estimated ejection fraction is 60%. Technically difficult study. EF   is based on limited views with poor endocardial border visualization.  · Indeterminate left ventricular diastolic function.  · Normal right ventricular size with normal right ventricular systolic   function.  · PASP is 32 plus central venous pressure which could not be determined.  · The ascending aorta is mildly dilated.          Imaging  No results found.    Prior coronary angiogram / intervention:  See HPI    Assessment and Plan  1. Syncope, orthostatic  The patient is  taking fludrocortisone and droxidopa per his outside cardiologist  However the patient is also taking Coreg  Will request records from outside cardiologist and consider modification and medication regimen    2. Orthostatic hypotension  As above    3. Essential hypertension  As above  - COMPREHENSIVE METABOLIC PANEL; Future  - CBC W/ AUTO DIFFERENTIAL; Future  - TSH; Future  - HEMOGLOBIN A1C; Future    4. Coronary artery disease involving native coronary artery of native heart without angina pectoris  Continue aspirin with Pravachol  Would suggest discontinuation of Plavix as he has not had PCI since 2017  Evidently there allergies to Crestor and Lipitor.  These allergies are unknown.      5. Prolonged QT  Avoid AV radha blocking agents but continue amnio for now need records reported history of sustained VT without ICD    6. Hyperlipidemia, unspecified hyperlipidemia type  Statin    7. VT (ventricular tachycardia)  Request records from outside cardiologist  The patient has an implanted LINQ device  Continue amiodarone and Coreg for now  Consider EP referral after records obtained    8. Ascending aortic aneurysm, unspecified whether ruptured  Control hypertension on beta-blocker  Serial monitoring difficult with advanced CKD    9. Carotid stenosis  Consider ultrasound if not done recently when outside records were obtained    Follow Up  1 month requesting records EKG next visit      Aj Marrufo MD, FACC, RPVI  Interventional Cardiology

## 2023-02-02 ENCOUNTER — TELEPHONE (OUTPATIENT)
Dept: PRIMARY CARE CLINIC | Facility: CLINIC | Age: 73
End: 2023-02-02
Payer: MEDICARE

## 2023-02-02 NOTE — TELEPHONE ENCOUNTER
Called patient in regards to his refill patient said every time he call he can't get a refill and to not worry about it.

## 2023-02-02 NOTE — TELEPHONE ENCOUNTER
----- Message from Genoveva Heard sent at 2/2/2023  2:35 PM CST -----  Contact: PEGGY BELLA JR. [[id number]]@ [phone]  Patient  is still waiting on his Rx refill for LIDOcaine HCL 4 % Crea . He spoke with [place] about this yesterday evening. Patient is discontented about this please call him.

## 2023-02-03 ENCOUNTER — PATIENT OUTREACH (OUTPATIENT)
Dept: ADMINISTRATIVE | Facility: HOSPITAL | Age: 73
End: 2023-02-03
Payer: MEDICARE

## 2023-02-06 ENCOUNTER — TELEPHONE (OUTPATIENT)
Dept: CARDIOLOGY | Facility: CLINIC | Age: 73
End: 2023-02-06
Payer: MEDICARE

## 2023-02-06 ENCOUNTER — INFUSION (OUTPATIENT)
Dept: INFECTIOUS DISEASES | Facility: HOSPITAL | Age: 73
End: 2023-02-06
Attending: INTERNAL MEDICINE
Payer: MEDICARE

## 2023-02-06 VITALS
DIASTOLIC BLOOD PRESSURE: 66 MMHG | RESPIRATION RATE: 18 BRPM | WEIGHT: 212.63 LBS | HEART RATE: 65 BPM | OXYGEN SATURATION: 96 % | BODY MASS INDEX: 27.3 KG/M2 | SYSTOLIC BLOOD PRESSURE: 134 MMHG | TEMPERATURE: 96 F

## 2023-02-06 DIAGNOSIS — E86.0 DEHYDRATION: Primary | ICD-10-CM

## 2023-02-06 DIAGNOSIS — G90.9 AUTONOMIC NEUROPATHY: ICD-10-CM

## 2023-02-06 DIAGNOSIS — Z93.3 COLOSTOMY PRESENT: ICD-10-CM

## 2023-02-06 DIAGNOSIS — N18.32 TYPE 2 DIABETES MELLITUS WITH STAGE 3B CHRONIC KIDNEY DISEASE, WITHOUT LONG-TERM CURRENT USE OF INSULIN: ICD-10-CM

## 2023-02-06 DIAGNOSIS — I10 ESSENTIAL HYPERTENSION: Primary | ICD-10-CM

## 2023-02-06 DIAGNOSIS — E11.22 TYPE 2 DIABETES MELLITUS WITH STAGE 3B CHRONIC KIDNEY DISEASE, WITHOUT LONG-TERM CURRENT USE OF INSULIN: ICD-10-CM

## 2023-02-06 PROCEDURE — 96360 HYDRATION IV INFUSION INIT: CPT | Mod: HCNC

## 2023-02-06 PROCEDURE — 25000003 PHARM REV CODE 250: Mod: HCNC | Performed by: NURSE PRACTITIONER

## 2023-02-06 RX ORDER — SODIUM CHLORIDE 0.9 % (FLUSH) 0.9 %
10 SYRINGE (ML) INJECTION
Status: CANCELLED | OUTPATIENT
Start: 2023-02-09

## 2023-02-06 RX ORDER — HEPARIN 100 UNIT/ML
500 SYRINGE INTRAVENOUS
Status: CANCELLED | OUTPATIENT
Start: 2023-02-09

## 2023-02-06 RX ORDER — CARVEDILOL 6.25 MG/1
6.25 TABLET ORAL 2 TIMES DAILY WITH MEALS
Qty: 180 TABLET | Refills: 0 | Status: SHIPPED | OUTPATIENT
Start: 2023-02-06 | End: 2023-03-02

## 2023-02-06 RX ADMIN — SODIUM CHLORIDE 1000 ML: 0.9 INJECTION, SOLUTION INTRAVENOUS at 08:02

## 2023-02-06 NOTE — TELEPHONE ENCOUNTER
"LOV 02/01/2023     Spoke with patient, he had IV fluid infusion this morning, staff said he looked clammy. He states he feels dizzy and eyes are heavy.    /66   P 59  15 minutes later  /66   P 68  20 standing   BP 91/62   P 68  1 minute ago   BP 95/62  P 64    He also checked blood suger  Glucose 180    He said, "15 min ago took Midodrine 10 mg (was advised by Dr. Dao to take when BP is low)".        "

## 2023-02-06 NOTE — PROGRESS NOTES
Pt arrived for NS x 1 liter bolus this am with c/o passing out on Friday am. Message to Bianka Arevalo NP below:          passing out  Received: Today  Elsy Hines RN sent to [facility]; Bianka Arevalo NP  Ms AngelesIrina,   Mr Charlton is here getting NS x 1 liter bolus, vs stable.  His c/o was that he passed out on Friday morning and couldn't make his appt with us in Infusion. He states that he went to the ED in [facility] but let because the triage was rude and said he looked too good to be there.       I want to ask if you want him to go to ED pass infusion for eval.  Please let me know.  thanks      Response from Ms Arevalo:    If he hit his head, should got to ER. Otherwise, if VSS, okay to do infusion. Unfortunately the passing out seems to be baseline; we are trying to mitigate with IVF while outpt workup continues    Okay. Pls stick to 1 L IVF for now    Pt did fine with infusion, encouraged to drink plenty of fluids at home, understood.  Left unit in NAD, next appt scheduled.

## 2023-02-06 NOTE — TELEPHONE ENCOUNTER
----- Message from Luann Goss sent at 2/6/2023 12:18 PM CST -----  Consult/Advisory    Name Of Caller: self      Contact Preference?:  [phone]      Nature of Call? States that BP keeps dropping, feels dizzy and would like to be advised on what to do

## 2023-02-07 NOTE — TELEPHONE ENCOUNTER
Discussed BP  Now normalized  Cut coreg in half to 6.25 BID (skip dose tonight)   Keep BP log  Suspect labile HTN +/- autonomic dysfunction  Refer to neurology  Will need to modify regimen further soon  The patient is new to me so awaiting records  Call with any concerns

## 2023-02-09 ENCOUNTER — HOSPITAL ENCOUNTER (OUTPATIENT)
Facility: HOSPITAL | Age: 73
LOS: 1 days | Discharge: HOME | DRG: 415 | End: 2023-02-16
Attending: INTERNAL MEDICINE | Admitting: STUDENT IN AN ORGANIZED HEALTH CARE EDUCATION/TRAINING PROGRAM
Payer: MEDICARE

## 2023-02-09 ENCOUNTER — PATIENT OUTREACH (OUTPATIENT)
Dept: ADMINISTRATIVE | Facility: OTHER | Age: 73
End: 2023-02-09
Payer: MEDICARE

## 2023-02-09 DIAGNOSIS — Z00.00 ENCOUNTER FOR MEDICARE ANNUAL WELLNESS EXAM: ICD-10-CM

## 2023-02-09 DIAGNOSIS — H53.8 BLURRED VISION, RIGHT EYE: ICD-10-CM

## 2023-02-09 DIAGNOSIS — Z09 NEED FOR CASE MANAGEMENT FOLLOW-UP: Primary | ICD-10-CM

## 2023-02-09 DIAGNOSIS — R07.9 CHEST PAIN: ICD-10-CM

## 2023-02-09 DIAGNOSIS — K81.2 ACUTE CHOLECYSTITIS WITH CHRONIC CHOLECYSTITIS: Primary | ICD-10-CM

## 2023-02-09 DIAGNOSIS — K83.8 COMMON BILE DUCT DILATION: ICD-10-CM

## 2023-02-09 DIAGNOSIS — R17 CHOLESTATIC JAUNDICE: ICD-10-CM

## 2023-02-09 LAB
POCT GLUCOSE: 245 MG/DL (ref 70–110)
POCT GLUCOSE: 72 MG/DL (ref 70–110)
POCT GLUCOSE: 85 MG/DL (ref 70–110)

## 2023-02-09 PROCEDURE — 99223 1ST HOSP IP/OBS HIGH 75: CPT | Mod: HCNC,,, | Performed by: INTERNAL MEDICINE

## 2023-02-09 PROCEDURE — 99223 PR INITIAL HOSPITAL CARE,LEVL III: ICD-10-PCS | Mod: HCNC,,, | Performed by: INTERNAL MEDICINE

## 2023-02-09 PROCEDURE — 63600175 PHARM REV CODE 636 W HCPCS: Mod: HCNC | Performed by: NURSE PRACTITIONER

## 2023-02-09 PROCEDURE — 25000003 PHARM REV CODE 250: Mod: HCNC | Performed by: STUDENT IN AN ORGANIZED HEALTH CARE EDUCATION/TRAINING PROGRAM

## 2023-02-09 PROCEDURE — 11000001 HC ACUTE MED/SURG PRIVATE ROOM: Mod: HCNC

## 2023-02-09 RX ORDER — ACETAMINOPHEN 325 MG/1
650 TABLET ORAL EVERY 8 HOURS PRN
Status: DISCONTINUED | OUTPATIENT
Start: 2023-02-09 | End: 2023-02-16

## 2023-02-09 RX ORDER — ACETAMINOPHEN 325 MG/1
650 TABLET ORAL EVERY 4 HOURS PRN
Status: DISCONTINUED | OUTPATIENT
Start: 2023-02-09 | End: 2023-02-09

## 2023-02-09 RX ORDER — INSULIN ASPART 100 [IU]/ML
0-5 INJECTION, SOLUTION INTRAVENOUS; SUBCUTANEOUS
Status: DISCONTINUED | OUTPATIENT
Start: 2023-02-09 | End: 2023-02-16 | Stop reason: HOSPADM

## 2023-02-09 RX ORDER — IBUPROFEN 200 MG
24 TABLET ORAL
Status: DISCONTINUED | OUTPATIENT
Start: 2023-02-09 | End: 2023-02-16 | Stop reason: HOSPADM

## 2023-02-09 RX ORDER — ENOXAPARIN SODIUM 100 MG/ML
30 INJECTION SUBCUTANEOUS EVERY 24 HOURS
Status: DISCONTINUED | OUTPATIENT
Start: 2023-02-10 | End: 2023-02-14

## 2023-02-09 RX ORDER — SODIUM CHLORIDE 9 MG/ML
INJECTION, SOLUTION INTRAVENOUS CONTINUOUS
Status: DISCONTINUED | OUTPATIENT
Start: 2023-02-09 | End: 2023-02-10

## 2023-02-09 RX ORDER — CARVEDILOL 3.12 MG/1
6.25 TABLET ORAL 2 TIMES DAILY WITH MEALS
Status: DISCONTINUED | OUTPATIENT
Start: 2023-02-09 | End: 2023-02-12

## 2023-02-09 RX ORDER — IBUPROFEN 200 MG
16 TABLET ORAL
Status: DISCONTINUED | OUTPATIENT
Start: 2023-02-09 | End: 2023-02-16 | Stop reason: HOSPADM

## 2023-02-09 RX ORDER — GLUCAGON 1 MG
1 KIT INJECTION
Status: DISCONTINUED | OUTPATIENT
Start: 2023-02-09 | End: 2023-02-16 | Stop reason: HOSPADM

## 2023-02-09 RX ORDER — HYDROXYZINE HYDROCHLORIDE 25 MG/1
50 TABLET, FILM COATED ORAL 3 TIMES DAILY PRN
Status: DISCONTINUED | OUTPATIENT
Start: 2023-02-09 | End: 2023-02-16 | Stop reason: HOSPADM

## 2023-02-09 RX ORDER — SODIUM CHLORIDE 0.9 % (FLUSH) 0.9 %
3 SYRINGE (ML) INJECTION EVERY 12 HOURS PRN
Status: DISCONTINUED | OUTPATIENT
Start: 2023-02-09 | End: 2023-02-16 | Stop reason: HOSPADM

## 2023-02-09 RX ORDER — SIMETHICONE 80 MG
1 TABLET,CHEWABLE ORAL 4 TIMES DAILY PRN
Status: DISCONTINUED | OUTPATIENT
Start: 2023-02-09 | End: 2023-02-16 | Stop reason: HOSPADM

## 2023-02-09 RX ORDER — AMIODARONE HYDROCHLORIDE 200 MG/1
200 TABLET ORAL 2 TIMES DAILY
Status: DISCONTINUED | OUTPATIENT
Start: 2023-02-09 | End: 2023-02-16 | Stop reason: HOSPADM

## 2023-02-09 RX ORDER — TALC
6 POWDER (GRAM) TOPICAL NIGHTLY PRN
Status: DISCONTINUED | OUTPATIENT
Start: 2023-02-09 | End: 2023-02-16 | Stop reason: HOSPADM

## 2023-02-09 RX ORDER — BRIMONIDINE TARTRATE 2 MG/ML
1 SOLUTION/ DROPS OPHTHALMIC 2 TIMES DAILY
Status: DISCONTINUED | OUTPATIENT
Start: 2023-02-09 | End: 2023-02-16 | Stop reason: HOSPADM

## 2023-02-09 RX ORDER — ALLOPURINOL 100 MG/1
300 TABLET ORAL DAILY
Status: DISCONTINUED | OUTPATIENT
Start: 2023-02-10 | End: 2023-02-16 | Stop reason: HOSPADM

## 2023-02-09 RX ORDER — POLYETHYLENE GLYCOL 3350 17 G/17G
17 POWDER, FOR SOLUTION ORAL DAILY
Status: DISCONTINUED | OUTPATIENT
Start: 2023-02-09 | End: 2023-02-09

## 2023-02-09 RX ORDER — ONDANSETRON 8 MG/1
8 TABLET, ORALLY DISINTEGRATING ORAL EVERY 8 HOURS PRN
Status: DISCONTINUED | OUTPATIENT
Start: 2023-02-09 | End: 2023-02-16 | Stop reason: HOSPADM

## 2023-02-09 RX ORDER — ASPIRIN 81 MG/1
81 TABLET ORAL DAILY
Status: DISCONTINUED | OUTPATIENT
Start: 2023-02-10 | End: 2023-02-16 | Stop reason: HOSPADM

## 2023-02-09 RX ORDER — NALOXONE HCL 0.4 MG/ML
0.02 VIAL (ML) INJECTION
Status: DISCONTINUED | OUTPATIENT
Start: 2023-02-09 | End: 2023-02-16 | Stop reason: HOSPADM

## 2023-02-09 RX ORDER — MAG HYDROX/ALUMINUM HYD/SIMETH 200-200-20
30 SUSPENSION, ORAL (FINAL DOSE FORM) ORAL 4 TIMES DAILY PRN
Status: DISCONTINUED | OUTPATIENT
Start: 2023-02-09 | End: 2023-02-16 | Stop reason: HOSPADM

## 2023-02-09 RX ORDER — ACETAMINOPHEN 325 MG/1
650 TABLET ORAL EVERY 8 HOURS PRN
Status: DISCONTINUED | OUTPATIENT
Start: 2023-02-09 | End: 2023-02-09

## 2023-02-09 RX ADMIN — CARVEDILOL 6.25 MG: 6.25 TABLET, FILM COATED ORAL at 06:02

## 2023-02-09 RX ADMIN — AMIODARONE HYDROCHLORIDE 200 MG: 200 TABLET ORAL at 12:02

## 2023-02-09 RX ADMIN — HYDROXYZINE HYDROCHLORIDE 50 MG: 25 TABLET, FILM COATED ORAL at 12:02

## 2023-02-09 RX ADMIN — INSULIN ASPART 1 UNITS: 100 INJECTION, SOLUTION INTRAVENOUS; SUBCUTANEOUS at 10:02

## 2023-02-09 RX ADMIN — ACETAMINOPHEN 650 MG: 325 TABLET ORAL at 10:02

## 2023-02-09 RX ADMIN — BRIMONIDINE TARTRATE 1 DROP: 2 SOLUTION OPHTHALMIC at 10:02

## 2023-02-09 RX ADMIN — HYDROXYZINE HYDROCHLORIDE 50 MG: 25 TABLET, FILM COATED ORAL at 10:02

## 2023-02-09 RX ADMIN — BRIMONIDINE TARTRATE 1 DROP: 2 SOLUTION OPHTHALMIC at 12:02

## 2023-02-09 RX ADMIN — SODIUM CHLORIDE: 0.9 INJECTION, SOLUTION INTRAVENOUS at 07:02

## 2023-02-09 RX ADMIN — ACETAMINOPHEN 650 MG: 325 TABLET ORAL at 12:02

## 2023-02-09 RX ADMIN — AMIODARONE HYDROCHLORIDE 200 MG: 200 TABLET ORAL at 10:02

## 2023-02-09 NOTE — SUBJECTIVE & OBJECTIVE
Past Medical History:   Diagnosis Date    Basal cell carcinoma     BPH (benign prostatic hyperplasia)     s/p TURP    Carotid stenosis     Chronic kidney disease     Claudication     Coronary artery disease     DDD (degenerative disc disease) 10/21/2013    Diabetes mellitus with renal complications     Disc disease, degenerative, cervical     Encounter for blood transfusion     GERD (gastroesophageal reflux disease)     Gout, chronic     History of ulcerative colitis     s/p colectomy and ileostomy    HLD (hyperlipidemia)     HTN (hypertension)     Ileostomy in place 1982    RBBB     Squamous cell carcinoma 03/08/2018    Left superior helix near insertion    Squamous cell carcinoma 04/12/2018    Left forearm x 5    Ventricular tachycardia        Past Surgical History:   Procedure Laterality Date    cardiac stents      CATARACT EXTRACTION Bilateral     CERVICAL FUSION      colectomy and ileostomy  1985    EXCISION OF PAROTID GLAND Left 12/18/2020    Procedure: EXCISION, PAROTID GLAND;  Surgeon: Michael Pinzon MD;  Location: [facility] OR [address] [place] [address];  Service: ENT;  Laterality: Left;    INSERTION OF IMPLANTABLE LOOP RECORDER  06/07/2021    INSERTION OF IMPLANTABLE LOOP RECORDER Left 6/7/2021    Procedure: INSERTION, IMPLANTABLE LOOP RECORDER;  Surgeon: Romario Dao MD;  Location: [facility] CATH LAB;  Service: Cardiology;  Laterality: Left;    LEFT HEART CATHETERIZATION Right 4/15/2021    Procedure: CATHETERIZATION, HEART, LEFT;  Surgeon: Marcio Jones MD;  Location: [facility] CATH LAB;  Service: Cardiology;  Laterality: Right;    LYSIS OF ADHESIONS N/A 11/9/2020    Procedure: LYSIS, ADHESIONS,  ERAS low;  Surgeon: CED Haley MD;  Location: [facility] OR [address] [place] [address];  Service: Colon and Rectal;  Laterality: N/A;    POUCHOSCOPY N/A 4/6/2022    Procedure: ENDOSCOPY, POUCH, SMALL INTESTINE, DIAGNOSTIC;  Surgeon: Dieter Juarez MD;  Location: [facility] ENDO ([address] [place] [address]);  Service: Endoscopy;  Laterality: N/A;    REPAIR, HERNIA,  PARASTOMAL N/A 11/9/2020    Procedure: REPAIR, HERNIA, PARASTOMAL;  Surgeon: CED Haley MD;  Location: [facility] OR 2ND FLR;  Service: Colon and Rectal;  Laterality: N/A;    TRANSURETHRAL RESECTION OF PROSTATE (TURP) WITHOUT USE OF LASER N/A 1/23/2019    Procedure: TURP, WITHOUT USING LASER BIPOLAR;  Surgeon: Catarino Mota MD;  Location: [facility] OR 1ST FLR;  Service: Urology;  Laterality: N/A;  1.5 HOURS       Review of patient's allergies indicates:   Allergen Reactions    Crestor [rosuvastatin]     Lipitor [atorvastatin]      Family History       Problem Relation (Age of Onset)    Cancer Father    Dementia Mother    Diabetes Father          Tobacco Use    Smoking status: Never    Smokeless tobacco: Never   Substance and Sexual Activity    Alcohol use: No    Drug use: No    Sexual activity: Not Currently     Partners: Female     Review of Systems   Constitutional:  Positive for appetite change and fatigue. Negative for chills and fever.   HENT:  Negative for facial swelling, mouth sores and trouble swallowing.    Eyes:  Negative for pain and redness.   Respiratory:  Negative for cough and shortness of breath.    Cardiovascular:  Negative for chest pain and leg swelling.   Gastrointestinal:  Positive for abdominal pain and nausea.   Genitourinary:  Negative for dysuria and hematuria.   Musculoskeletal:  Negative for gait problem and neck stiffness.   Skin:  Negative for rash and wound.   Neurological:  Negative for seizures and headaches.   Psychiatric/Behavioral:  Negative for confusion and hallucinations.    Objective:     Vital Signs (Most Recent):  Temp: 96.7 °F (35.9 °C) (02/09/23 1115)  Pulse: 87 (02/09/23 1115)  Resp: 16 (02/09/23 1115)  BP: (!) 140/79 (02/09/23 1115)  SpO2: 97 % (02/09/23 1115)   Vital Signs (24h Range):  Temp:  [96.7 °F (35.9 °C)-99.1 °F (37.3 °C)] 96.7 °F (35.9 °C)  Pulse:  [67-89] 87  Resp:  [16-23] 16  SpO2:  [95 %-98 %] 97 %  BP: ()/(51-82) 140/79     Weight: 96.4 kg (212 lb  8.4 oz) (02/09/23 1238)  Body mass index is 27.29 kg/m².    No intake or output data in the 24 hours ending 02/09/23 1508    Lines/Drains/Airways       Drain  Duration                  Ileostomy 01/07/19 1101 RLQ 1494 days              Peripheral Intravenous Line  Duration                  Peripheral IV - Single Lumen 02/08/23 1417 20 G Left Forearm 1 day                    Physical Exam  Vitals reviewed.   Constitutional:       General: He is not in acute distress.     Appearance: He is well-developed.   HENT:      Head: Normocephalic and atraumatic.   Eyes:      General: No scleral icterus.     Conjunctiva/sclera: Conjunctivae normal.   Neck:      Thyroid: No thyromegaly.   Cardiovascular:      Rate and Rhythm: Normal rate and regular rhythm.   Pulmonary:      Effort: Pulmonary effort is normal. No respiratory distress.      Breath sounds: Normal breath sounds.   Abdominal:      Palpations: Abdomen is soft. There is no mass.      Comments:  Rlq ostomy in place with scant stool     Musculoskeletal:         General: No tenderness. Normal range of motion.      Cervical back: Neck supple.   Lymphadenopathy:      Cervical: No cervical adenopathy.   Skin:     General: Skin is warm and dry.      Findings: No rash.   Neurological:      Mental Status: He is alert and oriented to person, place, and time.      Gait: Gait normal.   Psychiatric:         Mood and Affect: Mood normal.         Behavior: Behavior normal.       Significant Labs:  Amylase: No results for input(s): AMYLASE in the last 48 hours.  Blood Culture: No results for input(s): LABBLOO in the last 48 hours.  CBC:   Recent Labs   Lab 02/08/23  1417 02/09/23 0417   WBC 7.22 8.70   HGB 12.6* 11.8*   HCT 38.4* 37.0*    127*     BMP:   Recent Labs   Lab 02/09/23 0417         K 3.7      CO2 20*   BUN 33*   CREATININE 2.8*   CALCIUM 8.5*     CMP:   Recent Labs   Lab 02/09/23 0417      CALCIUM 8.5*   ALBUMIN 3.0*   PROT 6.5       K 3.7   CO2 20*      BUN 33*   CREATININE 2.8*   ALKPHOS 288*   *   *   BILITOT 4.8*     Coagulation: No results for input(s): PT, INR, APTT in the last 48 hours.    Significant Imaging:  Imaging results within the past 24 hours have been reviewed.

## 2023-02-09 NOTE — NURSING
Patient arrived to unit from [facility]. Vitals obtained and assessment performed. MD notified of patient's arrival to unit. VN also notified to complete admit assessment. Awaiting admit orders at this time.

## 2023-02-09 NOTE — CONSULTS
[facility] - Telemetry  Gastroenterology  Consult Note    Patient Name: Jarrett Charlton Jr.  MRN: [id number]  Admission Date: 2/9/2023  Hospital Length of Stay: 0 days  Code Status: Full Code   Attending Provider: Georgia Parikh MD   Consulting Provider: Jamar Wu MD  Primary Care Physician: Poli Gonzalez MD  Principal Problem:Cholestatic jaundice    Inpatient consult to [facility]  Consult performed by: Jamar Wu MD  Consult ordered by: Georgia Parikh MD        Subjective:     HPI:  This is 73 y/o hx CAD on asa, ckd , total colectomy with end ileostomy for UC in distant past here for upper abd distetnion.  Described upper abd pain and distention, mainly epigastric to RUQ , nonradiating , no changes in bowels. Ostomy appears working fine. No vomiting. Some nausea , no alleviating factors.. Symptosm slowly improved.   Transferred from outside er for gi evalatuion.    Mri with mild CBD dilation without filling defect  ElevatedLFTs      Past Medical History:   Diagnosis Date    Basal cell carcinoma     BPH (benign prostatic hyperplasia)     s/p TURP    Carotid stenosis     Chronic kidney disease     Claudication     Coronary artery disease     DDD (degenerative disc disease) 10/21/2013    Diabetes mellitus with renal complications     Disc disease, degenerative, cervical     Encounter for blood transfusion     GERD (gastroesophageal reflux disease)     Gout, chronic     History of ulcerative colitis     s/p colectomy and ileostomy    HLD (hyperlipidemia)     HTN (hypertension)     Ileostomy in place 1982    RBBB     Squamous cell carcinoma 03/08/2018    Left superior helix near insertion    Squamous cell carcinoma 04/12/2018    Left forearm x 5    Ventricular tachycardia        Past Surgical History:   Procedure Laterality Date    cardiac stents      CATARACT EXTRACTION Bilateral     CERVICAL FUSION      colectomy and ileostomy  1985    EXCISION OF PAROTID GLAND Left  12/18/2020    Procedure: EXCISION, PAROTID GLAND;  Surgeon: Michale Pinzon MD;  Location: [facility] OR 2ND FLR;  Service: ENT;  Laterality: Left;    INSERTION OF IMPLANTABLE LOOP RECORDER  06/07/2021    INSERTION OF IMPLANTABLE LOOP RECORDER Left 6/7/2021    Procedure: INSERTION, IMPLANTABLE LOOP RECORDER;  Surgeon: Romario Dao MD;  Location: [facility] CATH LAB;  Service: Cardiology;  Laterality: Left;    LEFT HEART CATHETERIZATION Right 4/15/2021    Procedure: CATHETERIZATION, HEART, LEFT;  Surgeon: Marcio Jones MD;  Location: [facility] CATH LAB;  Service: Cardiology;  Laterality: Right;    LYSIS OF ADHESIONS N/A 11/9/2020    Procedure: LYSIS, ADHESIONS,  ERAS low;  Surgeon: CED Haley MD;  Location: [facility] OR 2ND FLR;  Service: Colon and Rectal;  Laterality: N/A;    POUCHOSCOPY N/A 4/6/2022    Procedure: ENDOSCOPY, POUCH, SMALL INTESTINE, DIAGNOSTIC;  Surgeon: Dieter Juarez MD;  Location: [facility] ENDO (2ND FLR);  Service: Endoscopy;  Laterality: N/A;    REPAIR, HERNIA, PARASTOMAL N/A 11/9/2020    Procedure: REPAIR, HERNIA, PARASTOMAL;  Surgeon: CED Haley MD;  Location: [facility] OR 2ND FLR;  Service: Colon and Rectal;  Laterality: N/A;    TRANSURETHRAL RESECTION OF PROSTATE (TURP) WITHOUT USE OF LASER N/A 1/23/2019    Procedure: TURP, WITHOUT USING LASER BIPOLAR;  Surgeon: Catarino Mota MD;  Location: [facility] OR 1ST FLR;  Service: Urology;  Laterality: N/A;  1.5 HOURS       Review of patient's allergies indicates:   Allergen Reactions    Crestor [rosuvastatin]     Lipitor [atorvastatin]      Family History       Problem Relation (Age of Onset)    Cancer Father    Dementia Mother    Diabetes Father          Tobacco Use    Smoking status: Never    Smokeless tobacco: Never   Substance and Sexual Activity    Alcohol use: No    Drug use: No    Sexual activity: Not Currently     Partners: Female     Review of Systems   Constitutional:  Positive for appetite change and fatigue. Negative for  chills and fever.   HENT:  Negative for facial swelling, mouth sores and trouble swallowing.    Eyes:  Negative for pain and redness.   Respiratory:  Negative for cough and shortness of breath.    Cardiovascular:  Negative for chest pain and leg swelling.   Gastrointestinal:  Positive for abdominal pain and nausea.   Genitourinary:  Negative for dysuria and hematuria.   Musculoskeletal:  Negative for gait problem and neck stiffness.   Skin:  Negative for rash and wound.   Neurological:  Negative for seizures and headaches.   Psychiatric/Behavioral:  Negative for confusion and hallucinations.    Objective:     Vital Signs (Most Recent):  Temp: 96.7 °F (35.9 °C) (02/09/23 1115)  Pulse: 87 (02/09/23 1115)  Resp: 16 (02/09/23 1115)  BP: (!) 140/79 (02/09/23 1115)  SpO2: 97 % (02/09/23 1115)   Vital Signs (24h Range):  Temp:  [96.7 °F (35.9 °C)-99.1 °F (37.3 °C)] 96.7 °F (35.9 °C)  Pulse:  [67-89] 87  Resp:  [16-23] 16  SpO2:  [95 %-98 %] 97 %  BP: ()/(51-82) 140/79     Weight: 96.4 kg (212 lb 8.4 oz) (02/09/23 1238)  Body mass index is 27.29 kg/m².    No intake or output data in the 24 hours ending 02/09/23 1508    Lines/Drains/Airways       Drain  Duration                  Ileostomy 01/07/19 1101 RLQ 1494 days              Peripheral Intravenous Line  Duration                  Peripheral IV - Single Lumen 02/08/23 1417 20 G Left Forearm 1 day                    Physical Exam  Vitals reviewed.   Constitutional:       General: He is not in acute distress.     Appearance: He is well-developed.   HENT:      Head: Normocephalic and atraumatic.   Eyes:      General: No scleral icterus.     Conjunctiva/sclera: Conjunctivae normal.   Neck:      Thyroid: No thyromegaly.   Cardiovascular:      Rate and Rhythm: Normal rate and regular rhythm.   Pulmonary:      Effort: Pulmonary effort is normal. No respiratory distress.      Breath sounds: Normal breath sounds.   Abdominal:      Palpations: Abdomen is soft. There is no  mass.      Comments:  Rlq ostomy in place with scant stool     Musculoskeletal:         General: No tenderness. Normal range of motion.      Cervical back: Neck supple.   Lymphadenopathy:      Cervical: No cervical adenopathy.   Skin:     General: Skin is warm and dry.      Findings: No rash.   Neurological:      Mental Status: He is alert and oriented to person, place, and time.      Gait: Gait normal.   Psychiatric:         Mood and Affect: Mood normal.         Behavior: Behavior normal.       Significant Labs:  Amylase: No results for input(s): AMYLASE in the last 48 hours.  Blood Culture: No results for input(s): LABBLOO in the last 48 hours.  CBC:   Recent Labs   Lab 02/08/23  1417 02/09/23 0417   WBC 7.22 8.70   HGB 12.6* 11.8*   HCT 38.4* 37.0*    127*     BMP:   Recent Labs   Lab 02/09/23 0417         K 3.7      CO2 20*   BUN 33*   CREATININE 2.8*   CALCIUM 8.5*     CMP:   Recent Labs   Lab 02/09/23 0417      CALCIUM 8.5*   ALBUMIN 3.0*   PROT 6.5      K 3.7   CO2 20*      BUN 33*   CREATININE 2.8*   ALKPHOS 288*   *   *   BILITOT 4.8*     Coagulation: No results for input(s): PT, INR, APTT in the last 48 hours.    Significant Imaging:  Imaging results within the past 24 hours have been reviewed.    Assessment/Plan:     * Cholestatic jaundice  Elevated LFTs with dilated duct    Recs:  Advance diet today  NPO past Mn  EUS + / - ERCP tomorrow  Trend CMP          Thank you for your consult. I will follow-up with patient. Please contact us if you have any additional questions.    Jamar Wu MD  Gastroenterology  [place] [facility]

## 2023-02-09 NOTE — ASSESSMENT & PLAN NOTE
Elevated LFTs with dilated duct    Recs:  Advance diet today  NPO past Mn  EUS + / - ERCP tomorrow  Trend CMP

## 2023-02-09 NOTE — PLAN OF CARE
VN cued into pt's room for introduction with pt's permission.  VN role explained and informed pt that VN would be working with bedside nurse and the rest of the care team.  Fall risk and bed alarm protocol education provided.  Instructed pt to call for assistance and agreeable.  Allowed time for questions. Pt c/o generalized itching and headache. Prns given by bedside nurse.   Will cont to be available as needed.      02/09/23 1314   Admission   Initial VN Admission Questions Complete   Communication Issues? None   Shift   Virtual Nurse - Patient Verbalized Approval Of Camera Use;VN Rounding   Safety/Activity   Patient Rounds call light in patient/parent reach;visualized patient   Safety Promotion/Fall Prevention Fall Risk reviewed with patient/family;instructed to call staff for mobility   Pain/Comfort/Sleep   Preferred Pain Scale number (Numeric Rating Pain Scale)   Pain Body Location head   Pain Rating (0-10): Rest 10

## 2023-02-09 NOTE — HPI
This is 71 y/o hx CAD on asa, ckd , total colectomy with end ileostomy for UC in distant past here for upper abd distetnion.  Described upper abd pain and distention, mainly epigastric to RUQ , nonradiating , no changes in bowels. Ostomy appears working fine. No vomiting. Some nausea , no alleviating factors.. Symptosm slowly improved.   Transferred from outside er for gi evalatuion.    Mri with mild CBD dilation without filling defect  ElevatedLFTs

## 2023-02-09 NOTE — PROGRESS NOTES
IP Liaison - Initial Visit Note    Patient: Jarrett Charlton Jr.  MRN:  [id number]  Date of Service:  2/9/2023  Completed by:  REFUGIO Ferro    Reason for Visit   Patient presents with    IP Liaison Initial Visit       RSW met with patient at bedside in order to complete SDOH questionnaire and liaison assessment.  Pt has identified no social barriers to care. Per pt, pt is not in need of resources at this time. Due to pt eligibility, RSW referred pt to [facility] ([facility]).    The following were addressed during this visit:  - Review SDOH Questions   - Complete patient assessment   - Complete initial visit with patient        Patient Summary     IP Liaison Patient Assessment    General  Level of Caregiver support: Member independent and does not need caregiver assistance  Have you had to make a decision between paying for any of the following in the last 2 months?: None  Transportation means: Self  Employment status: Retired and not working  Assessments  Was the PHQ Depression Screening completed this visit?: No  Was the GUSTAVO-7 Screening completed this visit?: No       REFUGIO Ferro

## 2023-02-10 ENCOUNTER — ANESTHESIA EVENT (OUTPATIENT)
Dept: ENDOSCOPY | Facility: HOSPITAL | Age: 73
DRG: 415 | End: 2023-02-10
Payer: MEDICARE

## 2023-02-10 ENCOUNTER — TELEPHONE (OUTPATIENT)
Dept: NEPHROLOGY | Facility: CLINIC | Age: 73
End: 2023-02-10
Payer: MEDICARE

## 2023-02-10 ENCOUNTER — ANESTHESIA (OUTPATIENT)
Dept: ENDOSCOPY | Facility: HOSPITAL | Age: 73
DRG: 415 | End: 2023-02-10
Payer: MEDICARE

## 2023-02-10 PROBLEM — E16.2 HYPOGLYCEMIA: Status: ACTIVE | Noted: 2023-02-10

## 2023-02-10 LAB
ALBUMIN SERPL BCP-MCNC: 2.8 G/DL (ref 3.5–5.2)
ALP SERPL-CCNC: 269 U/L (ref 55–135)
ALT SERPL W/O P-5'-P-CCNC: 258 U/L (ref 10–44)
ANION GAP SERPL CALC-SCNC: 12 MMOL/L (ref 8–16)
AST SERPL-CCNC: 158 U/L (ref 10–40)
BILIRUB SERPL-MCNC: 3.8 MG/DL (ref 0.1–1)
BUN SERPL-MCNC: 30 MG/DL (ref 8–23)
CALCIUM SERPL-MCNC: 8.6 MG/DL (ref 8.7–10.5)
CHLORIDE SERPL-SCNC: 109 MMOL/L (ref 95–110)
CO2 SERPL-SCNC: 21 MMOL/L (ref 23–29)
CREAT SERPL-MCNC: 2.8 MG/DL (ref 0.5–1.4)
EST. GFR  (NO RACE VARIABLE): 23 ML/MIN/1.73 M^2
GLUCOSE SERPL-MCNC: 71 MG/DL (ref 70–110)
POCT GLUCOSE: 124 MG/DL (ref 70–110)
POCT GLUCOSE: 240 MG/DL (ref 70–110)
POCT GLUCOSE: 46 MG/DL (ref 70–110)
POCT GLUCOSE: 55 MG/DL (ref 70–110)
POCT GLUCOSE: 66 MG/DL (ref 70–110)
POCT GLUCOSE: 78 MG/DL (ref 70–110)
POCT GLUCOSE: 91 MG/DL (ref 70–110)
POTASSIUM SERPL-SCNC: 3.7 MMOL/L (ref 3.5–5.1)
PROT SERPL-MCNC: 6.3 G/DL (ref 6–8.4)
SODIUM SERPL-SCNC: 142 MMOL/L (ref 136–145)

## 2023-02-10 PROCEDURE — 94761 N-INVAS EAR/PLS OXIMETRY MLT: CPT | Mod: HCNC

## 2023-02-10 PROCEDURE — 37000008 HC ANESTHESIA 1ST 15 MINUTES: Mod: HCNC | Performed by: INTERNAL MEDICINE

## 2023-02-10 PROCEDURE — 43259 EGD US EXAM DUODENUM/JEJUNUM: CPT | Mod: HCNC,,, | Performed by: INTERNAL MEDICINE

## 2023-02-10 PROCEDURE — 43259 EGD US EXAM DUODENUM/JEJUNUM: CPT | Mod: HCNC | Performed by: INTERNAL MEDICINE

## 2023-02-10 PROCEDURE — 25000003 PHARM REV CODE 250: Mod: HCNC | Performed by: STUDENT IN AN ORGANIZED HEALTH CARE EDUCATION/TRAINING PROGRAM

## 2023-02-10 PROCEDURE — D9220A PRA ANESTHESIA: Mod: HCNC,CRNA,, | Performed by: NURSE ANESTHETIST, CERTIFIED REGISTERED

## 2023-02-10 PROCEDURE — D9220A PRA ANESTHESIA: Mod: HCNC,ANES,, | Performed by: ANESTHESIOLOGY

## 2023-02-10 PROCEDURE — 36415 COLL VENOUS BLD VENIPUNCTURE: CPT | Mod: HCNC | Performed by: NURSE PRACTITIONER

## 2023-02-10 PROCEDURE — D9220A PRA ANESTHESIA: ICD-10-PCS | Mod: HCNC,CRNA,, | Performed by: NURSE ANESTHETIST, CERTIFIED REGISTERED

## 2023-02-10 PROCEDURE — 96372 THER/PROPH/DIAG INJ SC/IM: CPT | Mod: 59 | Performed by: STUDENT IN AN ORGANIZED HEALTH CARE EDUCATION/TRAINING PROGRAM

## 2023-02-10 PROCEDURE — 63600175 PHARM REV CODE 636 W HCPCS: Mod: HCNC | Performed by: STUDENT IN AN ORGANIZED HEALTH CARE EDUCATION/TRAINING PROGRAM

## 2023-02-10 PROCEDURE — D9220A PRA ANESTHESIA: ICD-10-PCS | Mod: HCNC,ANES,, | Performed by: ANESTHESIOLOGY

## 2023-02-10 PROCEDURE — G0378 HOSPITAL OBSERVATION PER HR: HCPCS | Mod: HCNC

## 2023-02-10 PROCEDURE — 25000003 PHARM REV CODE 250: Mod: HCNC | Performed by: NURSE ANESTHETIST, CERTIFIED REGISTERED

## 2023-02-10 PROCEDURE — 37000009 HC ANESTHESIA EA ADD 15 MINS: Mod: HCNC | Performed by: INTERNAL MEDICINE

## 2023-02-10 PROCEDURE — 43259 PR ENDOSCOPIC ULTRASOUND EXAM: ICD-10-PCS | Mod: HCNC,,, | Performed by: INTERNAL MEDICINE

## 2023-02-10 PROCEDURE — 63600175 PHARM REV CODE 636 W HCPCS: Mod: HCNC | Performed by: NURSE ANESTHETIST, CERTIFIED REGISTERED

## 2023-02-10 PROCEDURE — 80053 COMPREHEN METABOLIC PANEL: CPT | Mod: HCNC | Performed by: NURSE PRACTITIONER

## 2023-02-10 RX ORDER — SODIUM CHLORIDE 0.9 % (FLUSH) 0.9 %
3 SYRINGE (ML) INJECTION
Status: DISCONTINUED | OUTPATIENT
Start: 2023-02-10 | End: 2023-02-10 | Stop reason: HOSPADM

## 2023-02-10 RX ORDER — BUTALBITAL, ACETAMINOPHEN AND CAFFEINE 50; 325; 40 MG/1; MG/1; MG/1
1 TABLET ORAL DAILY PRN
Status: DISCONTINUED | OUTPATIENT
Start: 2023-02-10 | End: 2023-02-16 | Stop reason: HOSPADM

## 2023-02-10 RX ORDER — PROPOFOL 10 MG/ML
VIAL (ML) INTRAVENOUS
Status: DISCONTINUED | OUTPATIENT
Start: 2023-02-10 | End: 2023-02-10

## 2023-02-10 RX ORDER — LIDOCAINE HYDROCHLORIDE 20 MG/ML
INJECTION INTRAVENOUS
Status: DISCONTINUED | OUTPATIENT
Start: 2023-02-10 | End: 2023-02-10

## 2023-02-10 RX ORDER — ONDANSETRON 2 MG/ML
4 INJECTION INTRAMUSCULAR; INTRAVENOUS ONCE AS NEEDED
Status: DISCONTINUED | OUTPATIENT
Start: 2023-02-10 | End: 2023-02-10 | Stop reason: HOSPADM

## 2023-02-10 RX ORDER — HYDRALAZINE HYDROCHLORIDE 20 MG/ML
5 INJECTION INTRAMUSCULAR; INTRAVENOUS EVERY 8 HOURS PRN
Status: DISCONTINUED | OUTPATIENT
Start: 2023-02-10 | End: 2023-02-16 | Stop reason: HOSPADM

## 2023-02-10 RX ORDER — DEXTROSE MONOHYDRATE AND SODIUM CHLORIDE 5; .9 G/100ML; G/100ML
INJECTION, SOLUTION INTRAVENOUS CONTINUOUS
Status: ACTIVE | OUTPATIENT
Start: 2023-02-10 | End: 2023-02-11

## 2023-02-10 RX ORDER — PROPOFOL 10 MG/ML
VIAL (ML) INTRAVENOUS CONTINUOUS PRN
Status: DISCONTINUED | OUTPATIENT
Start: 2023-02-10 | End: 2023-02-10

## 2023-02-10 RX ADMIN — CARVEDILOL 6.25 MG: 6.25 TABLET, FILM COATED ORAL at 04:02

## 2023-02-10 RX ADMIN — ALLOPURINOL 300 MG: 100 TABLET ORAL at 09:02

## 2023-02-10 RX ADMIN — BUTALBITAL, ACETAMINOPHEN, AND CAFFEINE 1 TABLET: 50; 325; 40 TABLET ORAL at 04:02

## 2023-02-10 RX ADMIN — ENOXAPARIN SODIUM 30 MG: 40 INJECTION SUBCUTANEOUS at 04:02

## 2023-02-10 RX ADMIN — PROPOFOL 150 MCG/KG/MIN: 10 INJECTION, EMULSION INTRAVENOUS at 11:02

## 2023-02-10 RX ADMIN — SODIUM CHLORIDE: 0.9 INJECTION, SOLUTION INTRAVENOUS at 11:02

## 2023-02-10 RX ADMIN — AMIODARONE HYDROCHLORIDE 200 MG: 200 TABLET ORAL at 09:02

## 2023-02-10 RX ADMIN — CARVEDILOL 6.25 MG: 6.25 TABLET, FILM COATED ORAL at 09:02

## 2023-02-10 RX ADMIN — DEXTROSE MONOHYDRATE 125 ML: 100 INJECTION, SOLUTION INTRAVENOUS at 04:02

## 2023-02-10 RX ADMIN — BRIMONIDINE TARTRATE 1 DROP: 2 SOLUTION OPHTHALMIC at 09:02

## 2023-02-10 RX ADMIN — GLYCOPYRROLATE 0.2 MG: 0.2 INJECTION, SOLUTION INTRAMUSCULAR; INTRAVITREAL at 11:02

## 2023-02-10 RX ADMIN — PROPOFOL 80 MG: 10 INJECTION, EMULSION INTRAVENOUS at 11:02

## 2023-02-10 RX ADMIN — LIDOCAINE HYDROCHLORIDE 100 MG: 20 INJECTION, SOLUTION INTRAVENOUS at 11:02

## 2023-02-10 RX ADMIN — DEXTROSE MONOHYDRATE 125 ML: 100 INJECTION, SOLUTION INTRAVENOUS at 07:02

## 2023-02-10 RX ADMIN — ASPIRIN 81 MG: 81 TABLET, COATED ORAL at 09:02

## 2023-02-10 RX ADMIN — DEXTROSE MONOHYDRATE 125 ML: 100 INJECTION, SOLUTION INTRAVENOUS at 01:02

## 2023-02-10 NOTE — NURSING
C/o headache. Checked BG which resulted as 55. PRN Dextrose bolus given (patient is NPO). States headache has resolved

## 2023-02-10 NOTE — PLAN OF CARE
Problem: Adult Inpatient Plan of Care  Goal: Plan of Care Review  Outcome: Ongoing, Progressing    Chart check complete. Vitals, orders, labs, and progress notes reviewed. Care plan updated. Will monitor.

## 2023-02-10 NOTE — ASSESSMENT & PLAN NOTE
Patient follows with Nephrology at Dr. Josef Degroot.  Per patient he gets normal saline IV twice a week to avoid dehydration secondary to losses from his ostomy.    Renal function currently appears to be at baseline    Gentle hydration with IV fluids  Monitor renal function closely.  Avoid nephrotoxic drugs.  Renal dose medications  Consider Nephrology consult in case of worsening renal function

## 2023-02-10 NOTE — ASSESSMENT & PLAN NOTE
Patient's FSGs are controlled on current medication regimen.  Last A1c reviewed-   Lab Results   Component Value Date    HGBA1C 7.0 (H) 12/06/2022     Most recent fingerstick glucose reviewed-   Recent Labs   Lab 02/08/23  2220 02/09/23  0837 02/09/23  1200 02/09/23  1644   POCTGLUCOSE 178* 68* 72 85     Current correctional scale  none    Antihyperglycemics (From admission, onward)    None        Hold Oral hypoglycemics while patient is in the hospital.

## 2023-02-10 NOTE — TELEPHONE ENCOUNTER
----- Message from Megan Toribio sent at 2/10/2023  8:24 AM CST -----  PEGGY JOHNSON calling regarding Appointment Access for wanting to be rescheduled for the appt today due to the PT being admitted in [facility] ,call back to daughter Karin [phone]...did not reschedule due to not knowing

## 2023-02-10 NOTE — ASSESSMENT & PLAN NOTE
Continue carvedilol home dose    BP elevated likely in setting of acute illness  PRN hydralazine for SBP>160

## 2023-02-10 NOTE — TRANSFER OF CARE
"Anesthesia Transfer of Care Note    Patient: Jarrett Charlton Jr.    Procedure(s) Performed: Procedure(s) (LRB):  ULTRASOUND, UPPER GI TRACT, ENDOSCOPIC (N/A)  ERCP (ENDOSCOPIC RETROGRADE CHOLANGIOPANCREATOGRAPHY) (N/A)    Patient location: GI    Anesthesia Type: general and other: gen natural airway    Transport from OR: Transported from OR on room air with adequate spontaneous ventilation    Post pain: adequate analgesia    Post assessment: no apparent anesthetic complications and tolerated procedure well    Post vital signs: stable    Level of consciousness: awake, alert and oriented    Nausea/Vomiting: no nausea/vomiting    Complications: none    Transfer of care protocol was followed      Last vitals:   Visit Vitals  /65   Pulse 77   Temp 36.3 °C (97.4 °F) (Temporal)   Resp 18   Ht 6' 2" (1.88 m)   Wt 94.3 kg (207 lb 14.3 oz)   SpO2 98%   BMI 26.69 kg/m²     "

## 2023-02-10 NOTE — OR NURSING
Patient scheduled for UEUS +/- ERCP. Chart reviewed and report taken from Kareem (nurse). AAO x4, NPO since MN, and IV in place. Procedural and anesthesia consents pnd.

## 2023-02-10 NOTE — ASSESSMENT & PLAN NOTE
Elevated LFTs, cholestatic pattern   Dilated duct seen on right upper quadrant ultrasound/MRCP     GI on board. NPO after midnight for U.S. +/-ERCP today  Trend CMP

## 2023-02-10 NOTE — PROVATION PATIENT INSTRUCTIONS
Discharge Summary/Instructions after an Endoscopic Procedure  Patient Name: Jarrett Charlton  Patient MRN: [id number]  Patient YOB: 1950  Friday, February 10, 2023  Poli Fuller MD  Dear patient,  As a result of recent federal legislation (The Federal Cures Act), you may   receive lab or pathology results from your procedure in your [facility]   account before your physician is able to contact you. Your physician or   their representative will relay the results to you with their   recommendations at their soonest availability.  Thank you,  Your health is very important to us during the Covid Crisis. Following your   procedure today, you will receive a daily text for 2 weeks asking about   signs or symptoms of Covid 19.  Please respond to this text when you   receive it so we can follow up and keep you as safe as possible.   RESTRICTIONS:  During your procedure today, you received medications for sedation.  These   medications may affect your judgment, balance and coordination.  Therefore,   for 24 hours, you have the following restrictions:   - DO NOT drive a car, operate machinery, make legal/financial decisions,   sign important papers or drink alcohol.    ACTIVITY:  Today: no heavy lifting, straining or running due to procedural   sedation/anesthesia.  The following day: return to full activity including work.  DIET:  Eat and drink normally unless instructed otherwise.     TREATMENT FOR COMMON SIDE EFFECTS:  - Mild abdominal pain, nausea, belching, bloating or excessive gas:  rest,   eat lightly and use a heating pad.  - Sore Throat: treat with throat lozenges and/or gargle with warm salt   water.  - Because air was used during the procedure, expelling large amounts of air   from your rectum or belching is normal.  - If a bowel prep was taken, you may not have a bowel movement for 1-3 days.    This is normal.  SYMPTOMS TO WATCH FOR AND REPORT TO YOUR PHYSICIAN:  1. Abdominal pain or bloating, other than  gas cramps.  2. Chest pain.  3. Back pain.  4. Signs of infection such as: chills or fever occurring within 24 hours   after the procedure.  5. Rectal bleeding, which would show as bright red, maroon, or black stools.   (A tablespoon of blood from the rectum is not serious, especially if   hemorrhoids are present.)  6. Vomiting.  7. Weakness or dizziness.  GO DIRECTLY TO THE NEAREST EMERGENCY ROOM IF YOU HAVE ANY OF THE FOLLOWING:      Difficulty breathing              Chills and/or fever over 101 F   Persistent vomiting and/or vomiting blood   Severe abdominal pain   Severe chest pain   Black, tarry stools   Bleeding- more than one tablespoon   Any other symptom or condition that you feel may need urgent attention  Your doctor recommends these additional instructions:  If any biopsies were taken, your doctors clinic will contact you in 1 to 2   weeks with any results.  - Return patient to [facility] for ongoing care.   - Resume previous diet.   - Continue present medications.   - Refer to a surgeon as previously scheduled.  For questions, problems or results please call your physician - Poli Fuller MD.  EMERGENCY PHONE NUMBER: [phone],  LAB RESULTS: [phone]  IF A COMPLICATION OR EMERGENCY SITUATION ARISES AND YOU ARE UNABLE TO REACH   YOUR PHYSICIAN - GO DIRECTLY TO THE EMERGENCY ROOM.  Poli Fuller MD  2/10/2023 11:40:31 AM  This report has been verified and signed electronically.  Dear patient,  As a result of recent federal legislation (The Federal Cures Act), you may   receive lab or pathology results from your procedure in your [facility]   account before your physician is able to contact you. Your physician or   their representative will relay the results to you with their   recommendations at their soonest availability.  Thank you,  PROVATION

## 2023-02-10 NOTE — ASSESSMENT & PLAN NOTE
No chest pain.  Troponin negative   Continue aspirin, beta-blocker.  Holding statin due to elevated LFT

## 2023-02-10 NOTE — PROGRESS NOTES
[facility] Medicine  Progress Note    Patient Name: Jarrett Charlton Jr.  MRN: [id number]  Patient Class: IP- Inpatient   Admission Date: 2/9/2023  Length of Stay: 1 days  Attending Physician: Georgia Parikh MD  Primary Care Provider: Poli Gonzalez MD        Subjective:     Principal Problem:Cholestatic jaundice      HPI:  72-year-old male with PMH of diabetes mellitus, CKD, hypertension, hyperlipidemia, remote history of ulcerative colitis status post total colectomy with diverting ileostomy who presented to outside hospital ER with abdominal pain and found to have cholestatic pattern elevated LFTs with mild dilatation of common bile duct on abdominal ultrasound.  Follow-up MRCP showed mild nonspecific dilatation of CBD without filling defect or intrahepatic biliary ductal dilatation.  No cholelithiasis or evidence of acute cholecystitis.  Patient was transferred to [facility] for GI/surgery evaluation.    During my encounter, patient denies any abdominal pain but reports excessive generalized itching for which she had received Benadryl prior to transfer.  Nausea associated with abdominal pain has now resolved.  Denies any diarrhea, fever.  Denies any chest pain or shortness of breath.  No family at bedside.      Overview/Hospital Course:  No notes on file    Interval History: hypoglycemic since NPO this morning. C/o headache. Otherwise doing okay. NPO for ERCP    Review of Systems   Respiratory:  Negative for shortness of breath.    Gastrointestinal:  Negative for abdominal pain.   Neurological:  Positive for headaches.   Objective:     Vital Signs (Most Recent):  Temp: 97.4 °F (36.3 °C) (02/10/23 1038)  Pulse: 73 (02/10/23 1038)  Resp: 18 (02/10/23 1038)  BP: (!) 160/85 (02/10/23 1038)  SpO2: 98 % (02/10/23 1038)   Vital Signs (24h Range):  Temp:  [96.7 °F (35.9 °C)-98.6 °F (37 °C)] 97.4 °F (36.3 °C)  Pulse:  [67-87] 73  Resp:  [16-20] 18  SpO2:  [94 %-98 %] 98 %  BP:  (112-181)/(68-92) 160/85     Weight: 94.3 kg (207 lb 14.3 oz)  Body mass index is 26.69 kg/m².    Intake/Output Summary (Last 24 hours) at 2/10/2023 1057  Last data filed at 2/10/2023 0416  Gross per 24 hour   Intake 643.28 ml   Output 820 ml   Net -176.72 ml      Physical Exam  Vitals and nursing note reviewed.   Constitutional:       General: He is not in acute distress.     Appearance: He is well-developed. He is not diaphoretic.   HENT:      Mouth/Throat:      Mouth: Mucous membranes are moist.   Eyes:      General: Scleral icterus present.   Neck:      Trachea: No tracheal deviation.   Cardiovascular:      Rate and Rhythm: Normal rate and regular rhythm.      Heart sounds: Normal heart sounds. No murmur heard.  Pulmonary:      Effort: Pulmonary effort is normal. No respiratory distress.      Breath sounds: Normal breath sounds. No wheezing.   Abdominal:      General: Bowel sounds are normal. There is no distension.      Palpations: Abdomen is soft.      Tenderness: There is no abdominal tenderness.   Musculoskeletal:      Cervical back: Neck supple.      Right lower leg: No edema.      Left lower leg: No edema.   Skin:     General: Skin is warm and dry.      Findings: No rash.   Neurological:      Mental Status: He is alert and oriented to person, place, and time.   Psychiatric:         Behavior: Behavior normal.       Significant Labs: All pertinent labs within the past 24 hours have been reviewed.    Significant Imaging: I have reviewed all pertinent imaging results/findings within the past 24 hours.      Assessment/Plan:      * Cholestatic jaundice  Elevated LFTs, cholestatic pattern   Dilated duct seen on right upper quadrant ultrasound/MRCP     GI on board. NPO after midnight for U.S. +/-ERCP today  Trend CMP        Hypoglycemia  Hypoglycemic likely in setting of NPO status with liver dysfunction. Not received any insulin    Start dextrose containing fluid while NPO until BG improves      Paroxysmal  ventricular tachycardia  Continue amiodarone   Telemetry  Ensure electrolytes are optimized      Prostate cancer  S/p TURP  Appears to be on leuprolide injections per MAR    History of gout  Chronic, stable.  Continue allopurinol      Abnormal LFTs (liver function tests)  As above      CAD (coronary artery disease)  No chest pain.  Troponin negative   Continue aspirin, beta-blocker.  Holding statin due to elevated LFT      CKD (chronic kidney disease) stage 3, GFR 30-59 ml/min  Patient follows with Nephrology at Dr. Josef Degroot.  Per patient he gets normal saline IV twice a week to avoid dehydration secondary to losses from his ostomy.    Renal function currently appears to be at baseline    Gentle hydration with IV fluids  Monitor renal function closely.  Avoid nephrotoxic drugs.  Renal dose medications  Consider Nephrology consult in case of worsening renal function      History of ulcerative colitis  S/p colectomy/ileostomy in the distant past      GERD (gastroesophageal reflux disease)        Essential hypertension  Continue carvedilol home dose    BP elevated likely in setting of acute illness  PRN hydralazine for SBP>160      Type 2 diabetes mellitus with stage 3 chronic kidney disease, without long-term current use of insulin  Patient's FSGs are controlled on current medication regimen.  Last A1c reviewed-   Lab Results   Component Value Date    HGBA1C 7.0 (H) 12/06/2022     Most recent fingerstick glucose reviewed-   Recent Labs   Lab 02/10/23  0358 02/10/23  0359 02/10/23  0442 02/10/23  0752   POCTGLUCOSE 55* 66* 91 55*     Current correctional scale  none    Antihyperglycemics (From admission, onward)    Start     Stop Route Frequency Ordered    02/09/23 2158  insulin aspart U-100 pen 0-5 Units         -- SubQ Before meals & nightly PRN 02/09/23 2059        Hold Oral hypoglycemics while patient is in the hospital.      VTE Risk Mitigation (From admission, onward)         Ordered     enoxaparin injection  30 mg  Daily         02/09/23 1138     IP VTE HIGH RISK PATIENT  Once         02/09/23 1138     Place sequential compression device  Until discontinued         02/09/23 1138                Discharge Planning   JOSÉ MIGUEL: 2/11/2023     Code Status: Full Code   Is the patient medically ready for discharge?:     Reason for patient still in hospital (select all that apply): Patient trending condition and Consult recommendations               Georgia Parikh MD  Department of Hospital Medicine   [place] - End[facility] ([facility])

## 2023-02-10 NOTE — PLAN OF CARE
[facility] - Telemetry  Initial Discharge Assessment       Primary Care Provider: Poli Gonzalez MD    Admission Diagnosis: Common bile duct dilation [K83.8]    Admission Date: 2/9/2023  Expected Discharge Date: 2/11/2023    Consult: CATHY    Payor: HU[employer] MANAGED MEDICARE / Plan: HU[employer] MEDICARE HMO / Product Type: Capitation /     Extended Emergency Contact Information  Primary Emergency Contact: Latisha Charlton  Home Phone: [phone]  Mobile Phone: [phone]  Relation: Daughter    Discharge Plan A: (P) Home with family  Discharge Plan B: (P) Home Health      [facility] - [place], [place] - [address]  [address]  [place] [address]  Phone: [phone] Fax: [phone]    [facility] - [place], [place] - [address]  [address] [facility] [address]  Phone: [phone] Fax: [phone]      Initial Assessment (most recent)       Adult Discharge Assessment - 02/10/23 1125          Discharge Assessment    Assessment Type Discharge Planning Assessment (P)      Confirmed/corrected address, phone number and insurance Yes (P)      Confirmed Demographics Correct on Facesheet (P)      Source of Information family (P)    Latisha roland ([phone])    Communicated JOSÉ MIGUEL with patient/caregiver Date not available/Unable to determine (P)      People in Home grandchild(eladio) (P)    adult GD    Do you expect to return to your current living situation? Yes (P)      Do you have help at home or someone to help you manage your care at home? Yes (P)      Prior to hospitilization cognitive status: Alert/Oriented (P)      Current cognitive status: Alert/Oriented (P)      Equipment Currently Used at Home glucometer;colostomy/ostomy supplies;other (see comments) (P)    BP machine    Readmission within 30 days? No (P)      Patient currently being followed by outpatient case management? No (P)      Do you currently have service(s) that help you  manage your care at home? No (P)      Do you take prescription medications? Yes (P)      Do you have prescription coverage? Yes (P)      Do you have any problems affording any of your prescribed medications? No (P)      Is the patient taking medications as prescribed? yes (P)      How do you get to doctors appointments? family or friend will provide;car, drives self (P)      Are you on dialysis? No (P)      Do you take coumadin? No (P)      Discharge Plan A Home with family (P)      Discharge Plan B Home Health (P)      DME Needed Upon Discharge  other (see comments) (P)    tbd    Discharge Plan discussed with: Adult children (P)         Financial Resource Strain    How hard is it for you to pay for the very basics like food, housing, medical care, and heating? Not hard at all (P)         Housing Stability    In the last 12 months, was there a time when you were not able to pay the mortgage or rent on time? No (P)      In the last 12 months, was there a time when you did not have a steady place to sleep or slept in a shelter (including now)? No (P)         Transportation Needs    In the past 12 months, has lack of transportation kept you from medical appointments or from getting medications? No (P)      In the past 12 months, has lack of transportation kept you from meetings, work, or from getting things needed for daily living? No (P)         Food Insecurity    Within the past 12 months, you worried that your food would run out before you got the money to buy more. Never true (P)      Within the past 12 months, the food you bought just didn't last and you didn't have money to get more. Never true (P)         Stress    Do you feel stress - tense, restless, nervous, or anxious, or unable to sleep at night because your mind is troubled all the time - these days? To some extent (P)         Social Connections    In a typical week, how many times do you talk on the phone with family, friends, or neighbors? More than  three times a week (P)      How often do you get together with friends or relatives? More than three times a week (P)      How often do you attend Adventist or Pentecostal services? Never (P)      Do you belong to any clubs or organizations such as Adventist groups, unions, fraternal or athletic groups, or school groups? Yes (P)      How often do you attend meetings of the clubs or organizations you belong to? Never (P)      Are you , , , , never , or living with a partner?  (P)         Alcohol Use    Q1: How often do you have a drink containing alcohol? Monthly or less (P)      Q2: How many drinks containing alcohol do you have on a typical day when you are drinking? 1 or 2 (P)      Q3: How often do you have six or more drinks on one occasion? Never (P)                       1125  Patient off the unit having an EUS/ERCP done when CM rounded. No family present. All discharge planning assessment information was obtained from the pt's daughter, Latisha Charlton ([phone]), via phone. Patient was admitted with cholestatic jaundic & is being followed by GI.  &  this AM (decreased from  &  on 2/8/2023).    Patient lives with his adult granddaughter, is independent of all ADLs, & Latisha denied the need for assistance with transportation at time of discharge.     CM updated patient's whiteboard with CM name & contact information.       1303  Message sent to the schedulers requesting a hospital follow up appointment with GI & a rescheduled appointment with Dr Arevalo (neph). Pt missed the appt with Dr Arevalo on 2/10/2023 at 1000 due to this hospitalization. Awaiting response.     1530  Patient resting quietly in bed when CM rounded. Pt c/o headache but no abd pain. CM informed nurse Kareem of above. CM informed Dr Parikh of patient's return & questioned discharge status. CM was informed by Dr Parikh that the pt was hypoglycemic post procedure (BS 44).  TITA informed Dr Parikh that, per nurse Kareem, the patient's blood sugar improved to 76 with apple juice. MD stated she would touch base with GI. Awaiting response.     1625  CM was informed by  Caitlin of a hospitals appts scheduled for the patient with NP Bianka Arevalo (neph) on 2/13/2023 at 1130. Previously scheduled appointment with Dr Poli Gonzalez (PCP) on 2/15/2023 at 1445 noted. Information added to the patient's discharge paperwork.       Will continue to follow.

## 2023-02-10 NOTE — SUBJECTIVE & OBJECTIVE
Past Medical History:   Diagnosis Date    Basal cell carcinoma     BPH (benign prostatic hyperplasia)     s/p TURP    Carotid stenosis     Chronic kidney disease     Claudication     Coronary artery disease     DDD (degenerative disc disease) 10/21/2013    Diabetes mellitus with renal complications     Disc disease, degenerative, cervical     Encounter for blood transfusion     GERD (gastroesophageal reflux disease)     Gout, chronic     History of ulcerative colitis     s/p colectomy and ileostomy    HLD (hyperlipidemia)     HTN (hypertension)     Ileostomy in place 1982    RBBB     Squamous cell carcinoma 03/08/2018    Left superior helix near insertion    Squamous cell carcinoma 04/12/2018    Left forearm x 5    Ventricular tachycardia        Past Surgical History:   Procedure Laterality Date    cardiac stents      CATARACT EXTRACTION Bilateral     CERVICAL FUSION      colectomy and ileostomy  1985    EXCISION OF PAROTID GLAND Left 12/18/2020    Procedure: EXCISION, PAROTID GLAND;  Surgeon: Michael Pinzon MD;  Location: [facility] OR [address] [place] [address];  Service: ENT;  Laterality: Left;    INSERTION OF IMPLANTABLE LOOP RECORDER  06/07/2021    INSERTION OF IMPLANTABLE LOOP RECORDER Left 6/7/2021    Procedure: INSERTION, IMPLANTABLE LOOP RECORDER;  Surgeon: Romario Dao MD;  Location: [facility] CATH LAB;  Service: Cardiology;  Laterality: Left;    LEFT HEART CATHETERIZATION Right 4/15/2021    Procedure: CATHETERIZATION, HEART, LEFT;  Surgeon: Marcio Jones MD;  Location: [facility] CATH LAB;  Service: Cardiology;  Laterality: Right;    LYSIS OF ADHESIONS N/A 11/9/2020    Procedure: LYSIS, ADHESIONS,  ERAS low;  Surgeon: CED Haley MD;  Location: [facility] OR [address] [place] [address];  Service: Colon and Rectal;  Laterality: N/A;    POUCHOSCOPY N/A 4/6/2022    Procedure: ENDOSCOPY, POUCH, SMALL INTESTINE, DIAGNOSTIC;  Surgeon: Dieter Juarez MD;  Location: [facility] ENDO ([address] [place] [address]);  Service: Endoscopy;  Laterality: N/A;    REPAIR, HERNIA,  PARASTOMAL N/A 11/9/2020    Procedure: REPAIR, HERNIA, PARASTOMAL;  Surgeon: CED Haley MD;  Location: [facility] OR 2ND FLR;  Service: Colon and Rectal;  Laterality: N/A;    TRANSURETHRAL RESECTION OF PROSTATE (TURP) WITHOUT USE OF LASER N/A 1/23/2019    Procedure: TURP, WITHOUT USING LASER BIPOLAR;  Surgeon: Catarino Mota MD;  Location: [facility] OR 1ST FLR;  Service: Urology;  Laterality: N/A;  1.5 HOURS       Review of patient's allergies indicates:   Allergen Reactions    Crestor [rosuvastatin]     Lipitor [atorvastatin]        Current Facility-Administered Medications on File Prior to Encounter   Medication    [COMPLETED] 0.9%  NaCl infusion    [COMPLETED] diphenhydrAMINE injection 12.5 mg    [COMPLETED] HYDROmorphone injection 1 mg    [COMPLETED] ketorolac injection 15 mg    [COMPLETED] ketorolac injection 15 mg    [COMPLETED] morphine injection 2 mg    [COMPLETED] morphine injection 2 mg    [COMPLETED] ondansetron injection 8 mg    [COMPLETED] sodium chloride 0.9% bolus 1,000 mL 1,000 mL    sodium chloride 0.9% in 1,000 mL infusion     Current Outpatient Medications on File Prior to Encounter   Medication Sig    allopurinoL (ZYLOPRIM) 300 MG tablet Take 1.5 tablets (450 mg total) by mouth once daily.    amiodarone (PACERONE) 200 MG Tab Take 1 tablet (200 mg total) by mouth 2 (two) times daily.    aspirin (ECOTRIN) 81 MG EC tablet Take 81 mg by mouth once daily.    brimonidine 0.2% (ALPHAGAN) 0.2 % Drop INSTILL 1 DROP INTO EACH EYE TWICE DAILY    calcium citrate-vitamin D3 (CITRACAL + D MAXIMUM) 315 mg-6.25 mcg (250 unit) Tab Take 1 tablet by mouth once daily.    carvediloL (COREG) 6.25 MG tablet Take 1 tablet (6.25 mg total) by mouth 2 (two) times daily with meals.    droxidopa 100 mg Cap Take 2 capsules by mouth once daily. 400mg daily    FIBER, CALCIUM POLYCARBOPHIL, 625 mg tablet Take 3 tablets by mouth before dinner.    glimepiride (AMARYL) 4 MG tablet Take 4 mg by mouth 2 (two) times daily before meals.     pantoprazole (PROTONIX) 40 MG tablet Take 40 mg by mouth once daily.    pravastatin (PRAVACHOL) 80 MG tablet Take 1 tablet (80 mg total) by mouth once daily.    ACCU-CHEK PAUL CONTROL SOLN Soln 1 drop by NOT APPLICABLE route once daily.    ACCU-CHEK SOFTCLIX LANCETS Misc Accucheck tid    ALPRAZolam (XANAX) 0.5 MG tablet Take 0.5 mg by mouth nightly as needed for Anxiety.    BD ALCOHOL SWABS PadM     blood sugar diagnostic (ONETOUCH ULTRA TEST) Strp USE ONE STRIP TO CHECK GLUCOSE EVERY DAY    blood-glucose meter (ACCU-CHEK GUIDE GLUCOSE METER) [facility] Accucheck BID    droxidopa 300 mg Cap Take 600 mg by mouth. 2 tablets three times a day     Family History       Problem Relation (Age of Onset)    Cancer Father    Dementia Mother    Diabetes Father          Tobacco Use    Smoking status: Never    Smokeless tobacco: Never   Substance and Sexual Activity    Alcohol use: No    Drug use: No    Sexual activity: Not Currently     Partners: Female     Review of Systems   Constitutional:  Negative for fever.   Respiratory:  Negative for shortness of breath.    Gastrointestinal:  Positive for abdominal pain (now resolved) and nausea.   Neurological:  Positive for headaches.   Objective:     Vital Signs (Most Recent):  Temp: 98.6 °F (37 °C) (02/09/23 1650)  Pulse: 68 (02/09/23 1650)  Resp: 20 (02/09/23 1650)  BP: 112/68 (02/09/23 1650)  SpO2: 95 % (02/09/23 1650) Vital Signs (24h Range):  Temp:  [96.7 °F (35.9 °C)-99.1 °F (37.3 °C)] 98.6 °F (37 °C)  Pulse:  [67-89] 68  Resp:  [16-22] 20  SpO2:  [95 %-98 %] 95 %  BP: ()/(51-82) 112/68     Weight: 96.4 kg (212 lb 8.4 oz)  Body mass index is 27.29 kg/m².    Physical Exam  Vitals and nursing note reviewed.   Constitutional:       General: He is not in acute distress.     Appearance: He is well-developed. He is not diaphoretic.   HENT:      Head: Normocephalic and atraumatic.      Mouth/Throat:      Mouth: Mucous membranes are dry.   Eyes:      General: Scleral icterus  present.   Neck:      Trachea: No tracheal deviation.   Cardiovascular:      Rate and Rhythm: Normal rate and regular rhythm.      Heart sounds: Normal heart sounds. No murmur heard.  Pulmonary:      Effort: Pulmonary effort is normal. No respiratory distress.      Breath sounds: Normal breath sounds. No wheezing.   Abdominal:      General: Bowel sounds are normal. There is no distension.      Palpations: Abdomen is soft.      Tenderness: There is no abdominal tenderness.   Musculoskeletal:      Right lower leg: No edema.      Left lower leg: No edema.   Skin:     General: Skin is warm and dry.      Findings: No rash.   Neurological:      Mental Status: He is alert and oriented to person, place, and time. Mental status is at baseline.   Psychiatric:         Behavior: Behavior normal.           Significant Labs: All pertinent labs within the past 24 hours have been reviewed.    Significant Imaging: I have reviewed all pertinent imaging results/findings within the past 24 hours.

## 2023-02-10 NOTE — PLAN OF CARE
Problem: Adult Inpatient Plan of Care  Goal: Plan of Care Review  Outcome: Ongoing, Progressing   A&Ox4. Vital signs stable. Pain and headache managed. Tolerating PO intake; NPO since midnight. Urine output adequate. Repositioned independently with minimal assistance. Safety precautions in place. Plan of care reviewed with patient.

## 2023-02-10 NOTE — ASSESSMENT & PLAN NOTE
Hypoglycemic likely in setting of NPO status with liver dysfunction. Not received any insulin    Start dextrose containing fluid while NPO until BG improves

## 2023-02-10 NOTE — ASSESSMENT & PLAN NOTE
Elevated LFTs, cholestatic pattern   Dilated duct seen on right upper quadrant ultrasound/MRCP     GI on board.  Okay for a diet today.  NPO after midnight for [place] +/-ERCP tomorrow   Trend CMP

## 2023-02-10 NOTE — ANESTHESIA POSTPROCEDURE EVALUATION
Anesthesia Post Evaluation    Patient: Jarrett Charlton Jr.    Procedure(s) Performed: Procedure(s) (LRB):  ULTRASOUND, UPPER GI TRACT, ENDOSCOPIC (N/A)  ERCP (ENDOSCOPIC RETROGRADE CHOLANGIOPANCREATOGRAPHY) (N/A)    Final Anesthesia Type: general      Patient location during evaluation: PACU  Patient participation: Yes- Able to Participate  Level of consciousness: awake and alert  Post-procedure vital signs: reviewed and stable  Pain management: adequate  Airway patency: patent    PONV status at discharge: No PONV  Anesthetic complications: no      Cardiovascular status: blood pressure returned to baseline  Respiratory status: unassisted  Hydration status: euvolemic  Follow-up not needed.          Vitals Value Taken Time   /77 02/10/23 1232   Temp 35.9 °C (96.6 °F) 02/10/23 1232   Pulse 71 02/10/23 1232   Resp 17 02/10/23 1232   SpO2 95 % 02/10/23 1232         Event Time   Out of Recovery 02/10/2023 12:26:53         Pain/Jamie Score: Pain Rating Prior to Med Admin: 10 (2/9/2023 10:14 PM)  Pain Rating Post Med Admin: 5 (2/9/2023  9:11 AM)  Jamie Score: 10 (2/10/2023 12:03 PM)

## 2023-02-10 NOTE — ANESTHESIA PREPROCEDURE EVALUATION
02/10/2023  Jarrett Charlton Jr. is a 72 y.o., male.      Pre-op Assessment    I have reviewed the Patient Summary Reports.       I have reviewed the Medications.     Review of Systems  Anesthesia Hx:  No problems with previous Anesthesia  History of prior surgery of interest to airway management or planning: cervical fusion. Previous anesthesia: General   Social:  Non-Smoker, No Alcohol Use    Hematology/Oncology:  Hematology Normal   Oncology Normal     EENT/Dental:EENT/Dental Normal   Cardiovascular:   Hypertension, well controlled CAD  Dysrhythmias  V tach   Pulmonary:  Pulmonary Normal    Renal/:   Chronic Renal Disease, CKD BPH Stage 4 CKD   Hepatic/GI:   GERD, well controlled    Musculoskeletal:   Arthritis     Neurological:   Neuromuscular Disease,    Endocrine:   Diabetes, well controlled, type 2    Dermatological:  Skin Normal    Psych:  Psychiatric Normal           Physical Exam  General: Well nourished, Cooperative, Alert and Oriented    Airway:  Mallampati: II   Mouth Opening: Normal  TM Distance: Normal  Tongue: Normal  Neck ROM: Normal ROM    Dental:  Intact        Anesthesia Plan  Type of Anesthesia, risks & benefits discussed:    Anesthesia Type: Gen Natural Airway  Intra-op Monitoring Plan: Standard ASA Monitors  Post Op Pain Control Plan: multimodal analgesia and IV/PO Opioids PRN  Induction:  IV  Airway Plan: , Post-Induction  Informed Consent: Informed consent signed with the Patient and all parties understand the risks and agree with anesthesia plan.  All questions answered.   ASA Score: 3    Ready For Surgery From Anesthesia Perspective.     .

## 2023-02-10 NOTE — HPI
72-year-old male with PMH of diabetes mellitus, CKD, hypertension, hyperlipidemia, remote history of ulcerative colitis status post total colectomy with diverting ileostomy who presented to outside hospital ER with abdominal pain and found to have cholestatic pattern elevated LFTs with mild dilatation of common bile duct on abdominal ultrasound.  Follow-up MRCP showed mild nonspecific dilatation of CBD without filling defect or intrahepatic biliary ductal dilatation.  No cholelithiasis or evidence of acute cholecystitis.  Patient was transferred to [facility] for GI/surgery evaluation.    During my encounter, patient denies any abdominal pain but reports excessive generalized itching for which she had received Benadryl prior to transfer.  Nausea associated with abdominal pain has now resolved.  Denies any diarrhea, fever.  Denies any chest pain or shortness of breath.  No family at bedside.

## 2023-02-10 NOTE — PLAN OF CARE
Patient recovered to baseline.  VSS.  Patient seen by MD at bedside.  Patient escorted back to hospital room w/transporter.  Report given to bedside nurse Kareem.  Nurse verbalized understanding.  All questions answered.

## 2023-02-10 NOTE — SUBJECTIVE & OBJECTIVE
Interval History: hypoglycemic since NPO this morning. C/o headache. Otherwise doing okay. NPO for ERCP    Review of Systems   Respiratory:  Negative for shortness of breath.    Gastrointestinal:  Negative for abdominal pain.   Neurological:  Positive for headaches.   Objective:     Vital Signs (Most Recent):  Temp: 97.4 °F (36.3 °C) (02/10/23 1038)  Pulse: 73 (02/10/23 1038)  Resp: 18 (02/10/23 1038)  BP: (!) 160/85 (02/10/23 1038)  SpO2: 98 % (02/10/23 1038)   Vital Signs (24h Range):  Temp:  [96.7 °F (35.9 °C)-98.6 °F (37 °C)] 97.4 °F (36.3 °C)  Pulse:  [67-87] 73  Resp:  [16-20] 18  SpO2:  [94 %-98 %] 98 %  BP: (112-181)/(68-92) 160/85     Weight: 94.3 kg (207 lb 14.3 oz)  Body mass index is 26.69 kg/m².    Intake/Output Summary (Last 24 hours) at 2/10/2023 1057  Last data filed at 2/10/2023 0416  Gross per 24 hour   Intake 643.28 ml   Output 820 ml   Net -176.72 ml      Physical Exam  Vitals and nursing note reviewed.   Constitutional:       General: He is not in acute distress.     Appearance: He is well-developed. He is not diaphoretic.   HENT:      Mouth/Throat:      Mouth: Mucous membranes are moist.   Eyes:      General: Scleral icterus present.   Neck:      Trachea: No tracheal deviation.   Cardiovascular:      Rate and Rhythm: Normal rate and regular rhythm.      Heart sounds: Normal heart sounds. No murmur heard.  Pulmonary:      Effort: Pulmonary effort is normal. No respiratory distress.      Breath sounds: Normal breath sounds. No wheezing.   Abdominal:      General: Bowel sounds are normal. There is no distension.      Palpations: Abdomen is soft.      Tenderness: There is no abdominal tenderness.   Musculoskeletal:      Cervical back: Neck supple.      Right lower leg: No edema.      Left lower leg: No edema.   Skin:     General: Skin is warm and dry.      Findings: No rash.   Neurological:      Mental Status: He is alert and oriented to person, place, and time.   Psychiatric:         Behavior:  Behavior normal.       Significant Labs: All pertinent labs within the past 24 hours have been reviewed.    Significant Imaging: I have reviewed all pertinent imaging results/findings within the past 24 hours.

## 2023-02-10 NOTE — PLAN OF CARE
02/09/23 1942   Sepsis Screen (IP)   Is the patient's history or complaint suggestive of a possible infection? No   Are there at least two of the following signs and symptoms present? No   Are any of the following organ dysfunction criteria present and not considered to be due to a chronic condition? Yes   Organ Dysfunction Criteria Creatinine > 2.0   Organ Dysfunction Criteria Total Bilirubin > 2.0 Platelet count < 100,000   Initiate Sepsis Protocol No     Sepsis notification received. Patient does not meet sepsis criteria at this time. Will continue current plan of care.

## 2023-02-10 NOTE — H&P
[facility] Medicine  History & Physical    Patient Name: Jarrett Charlton Jr.  MRN: [id number]  Patient Class: IP- Inpatient  Admission Date: 2/9/2023  Attending Physician: Georgia Parikh MD   Primary Care Provider: Poli Gonzalez MD         Patient information was obtained from patient, past medical records and ER records.     Subjective:     Principal Problem:Cholestatic jaundice    Chief Complaint:   Chief Complaint   Patient presents with    Abdominal Pain     Transfer from OSH for AES evaluation for cholestatic jaundice        HPI: 72-year-old male with PMH of diabetes mellitus, CKD, hypertension, hyperlipidemia, remote history of ulcerative colitis status post total colectomy with diverting ileostomy who presented to outside hospital ER with abdominal pain and found to have cholestatic pattern elevated LFTs with mild dilatation of common bile duct on abdominal ultrasound.  Follow-up MRCP showed mild nonspecific dilatation of CBD without filling defect or intrahepatic biliary ductal dilatation.  No cholelithiasis or evidence of acute cholecystitis.  Patient was transferred to [facility] for GI/surgery evaluation.    During my encounter, patient denies any abdominal pain but reports excessive generalized itching for which she had received Benadryl prior to transfer.  Nausea associated with abdominal pain has now resolved.  Denies any diarrhea, fever.  Denies any chest pain or shortness of breath.  No family at bedside.      Past Medical History:   Diagnosis Date    Basal cell carcinoma     BPH (benign prostatic hyperplasia)     s/p TURP    Carotid stenosis     Chronic kidney disease     Claudication     Coronary artery disease     DDD (degenerative disc disease) 10/21/2013    Diabetes mellitus with renal complications     Disc disease, degenerative, cervical     Encounter for blood transfusion     GERD (gastroesophageal reflux disease)     Gout, chronic     History of ulcerative  colitis     s/p colectomy and ileostomy    HLD (hyperlipidemia)     HTN (hypertension)     Ileostomy in place 1982    RBBB     Squamous cell carcinoma 03/08/2018    Left superior helix near insertion    Squamous cell carcinoma 04/12/2018    Left forearm x 5    Ventricular tachycardia        Past Surgical History:   Procedure Laterality Date    cardiac stents      CATARACT EXTRACTION Bilateral     CERVICAL FUSION      colectomy and ileostomy  1985    EXCISION OF PAROTID GLAND Left 12/18/2020    Procedure: EXCISION, PAROTID GLAND;  Surgeon: Michael Pinzon MD;  Location: [facility] OR 2ND FLR;  Service: ENT;  Laterality: Left;    INSERTION OF IMPLANTABLE LOOP RECORDER  06/07/2021    INSERTION OF IMPLANTABLE LOOP RECORDER Left 6/7/2021    Procedure: INSERTION, IMPLANTABLE LOOP RECORDER;  Surgeon: Romario Dao MD;  Location: [facility] CATH LAB;  Service: Cardiology;  Laterality: Left;    LEFT HEART CATHETERIZATION Right 4/15/2021    Procedure: CATHETERIZATION, HEART, LEFT;  Surgeon: Marcio Jones MD;  Location: [facility] CATH LAB;  Service: Cardiology;  Laterality: Right;    LYSIS OF ADHESIONS N/A 11/9/2020    Procedure: LYSIS, ADHESIONS,  ERAS low;  Surgeon: CED aHley MD;  [facility];  Service: Colon and Rectal;  Laterality: N/A;    POUCHOSCOPY N/A 4/6/2022    Procedure: ENDOSCOPY, POUCH, SMALL INTESTINE, DIAGNOSTIC;  Surgeon: Dieter Juarez MD;  Location: [facility] (2ND FLR);  Service: Endoscopy;  Laterality: N/A;    REPAIR, HERNIA, PARASTOMAL N/A 11/9/2020    Procedure: REPAIR, HERNIA, PARASTOMAL;  Surgeon: CED Haley MD;  Location: [facility] OR 2ND FLR;  Service: Colon and Rectal;  Laterality: N/A;    TRANSURETHRAL RESECTION OF PROSTATE (TURP) WITHOUT USE OF LASER N/A 1/23/2019    Procedure: TURP, WITHOUT USING LASER BIPOLAR;  Surgeon: Catarino Mota MD;  Location: [facility] OR 1ST FLR;  Service: Urology;  Laterality: N/A;  1.5 HOURS       Review of patient's allergies  toothache indicates:   Allergen Reactions    Crestor [rosuvastatin]     Lipitor [atorvastatin]        Current Facility-Administered Medications on File Prior to Encounter   Medication    [COMPLETED] 0.9%  NaCl infusion    [COMPLETED] diphenhydrAMINE injection 12.5 mg    [COMPLETED] HYDROmorphone injection 1 mg    [COMPLETED] ketorolac injection 15 mg    [COMPLETED] ketorolac injection 15 mg    [COMPLETED] morphine injection 2 mg    [COMPLETED] morphine injection 2 mg    [COMPLETED] ondansetron injection 8 mg    [COMPLETED] sodium chloride 0.9% bolus 1,000 mL 1,000 mL    sodium chloride 0.9% in 1,000 mL infusion     Current Outpatient Medications on File Prior to Encounter   Medication Sig    allopurinoL (ZYLOPRIM) 300 MG tablet Take 1.5 tablets (450 mg total) by mouth once daily.    amiodarone (PACERONE) 200 MG Tab Take 1 tablet (200 mg total) by mouth 2 (two) times daily.    aspirin (ECOTRIN) 81 MG EC tablet Take 81 mg by mouth once daily.    brimonidine 0.2% (ALPHAGAN) 0.2 % Drop INSTILL 1 DROP INTO EACH EYE TWICE DAILY    calcium citrate-vitamin D3 (CITRACAL + D MAXIMUM) 315 mg-6.25 mcg (250 unit) Tab Take 1 tablet by mouth once daily.    carvediloL (COREG) 6.25 MG tablet Take 1 tablet (6.25 mg total) by mouth 2 (two) times daily with meals.    droxidopa 100 mg Cap Take 2 capsules by mouth once daily. 400mg daily    FIBER, CALCIUM POLYCARBOPHIL, 625 mg tablet Take 3 tablets by mouth before dinner.    glimepiride (AMARYL) 4 MG tablet Take 4 mg by mouth 2 (two) times daily before meals.    pantoprazole (PROTONIX) 40 MG tablet Take 40 mg by mouth once daily.    pravastatin (PRAVACHOL) 80 MG tablet Take 1 tablet (80 mg total) by mouth once daily.    ACCU-CHEK PAUL CONTROL SOLN Soln 1 drop by NOT APPLICABLE route once daily.    ACCU-CHEK SOFTCLIX LANCETS Misc Accucheck tid    ALPRAZolam (XANAX) 0.5 MG tablet Take 0.5 mg by mouth nightly as needed for Anxiety.    BD ALCOHOL SWABS PadM     blood sugar  diagnostic (ONETOUCH ULTRA TEST) Strp USE ONE STRIP TO CHECK GLUCOSE EVERY DAY    blood-glucose meter (ACCU-CHEK GUIDE GLUCOSE METER) [facility] Accucheck BID    droxidopa 300 mg Cap Take 600 mg by mouth. 2 tablets three times a day     Family History       Problem Relation (Age of Onset)    Cancer Father    Dementia Mother    Diabetes Father          Tobacco Use    Smoking status: Never    Smokeless tobacco: Never   Substance and Sexual Activity    Alcohol use: No    Drug use: No    Sexual activity: Not Currently     Partners: Female     Review of Systems   Constitutional:  Negative for fever.   Respiratory:  Negative for shortness of breath.    Gastrointestinal:  Positive for abdominal pain (now resolved) and nausea.   Neurological:  Positive for headaches.   Objective:     Vital Signs (Most Recent):  Temp: 98.6 °F (37 °C) (02/09/23 1650)  Pulse: 68 (02/09/23 1650)  Resp: 20 (02/09/23 1650)  BP: 112/68 (02/09/23 1650)  SpO2: 95 % (02/09/23 1650) Vital Signs (24h Range):  Temp:  [96.7 °F (35.9 °C)-99.1 °F (37.3 °C)] 98.6 °F (37 °C)  Pulse:  [67-89] 68  Resp:  [16-22] 20  SpO2:  [95 %-98 %] 95 %  BP: ()/(51-82) 112/68     Weight: 96.4 kg (212 lb 8.4 oz)  Body mass index is 27.29 kg/m².    Physical Exam  Vitals and nursing note reviewed.   Constitutional:       General: He is not in acute distress.     Appearance: He is well-developed. He is not diaphoretic.   HENT:      Head: Normocephalic and atraumatic.      Mouth/Throat:      Mouth: Mucous membranes are dry.   Eyes:      General: Scleral icterus present.   Neck:      Trachea: No tracheal deviation.   Cardiovascular:      Rate and Rhythm: Normal rate and regular rhythm.      Heart sounds: Normal heart sounds. No murmur heard.  Pulmonary:      Effort: Pulmonary effort is normal. No respiratory distress.      Breath sounds: Normal breath sounds. No wheezing.   Abdominal:      General: Bowel sounds are normal. There is no distension.      Palpations: Abdomen  is soft.      Tenderness: There is no abdominal tenderness.   Musculoskeletal:      Right lower leg: No edema.      Left lower leg: No edema.   Skin:     General: Skin is warm and dry.      Findings: No rash.   Neurological:      Mental Status: He is alert and oriented to person, place, and time. Mental status is at baseline.   Psychiatric:         Behavior: Behavior normal.           Significant Labs: All pertinent labs within the past 24 hours have been reviewed.    Significant Imaging: I have reviewed all pertinent imaging results/findings within the past 24 hours.    Assessment/Plan:     * Cholestatic jaundice  Elevated LFTs, cholestatic pattern   Dilated duct seen on right upper quadrant ultrasound/MRCP     GI on board.  Okay for a diet today.  NPO after midnight for [place] +/-ERCP tomorrow   Trend CMP        Paroxysmal ventricular tachycardia  Continue amiodarone   Telemetry  Ensure electrolytes are optimized      Prostate cancer  S/p TURP  Appears to be on leuprolide injections per MAR    History of gout  Chronic, stable.  Continue allopurinol      Abnormal LFTs (liver function tests)  As above      CAD (coronary artery disease)  No chest pain.  Troponin negative   Continue aspirin, beta-blocker.  Holding statin due to elevated LFT      CKD (chronic kidney disease) stage 3, GFR 30-59 ml/min  Patient follows with Nephrology at Dr. Bahena [place].  Per patient he gets normal saline IV twice a week to avoid dehydration secondary to losses from his ostomy.    Renal function currently appears to be at baseline    Gentle hydration with IV fluids  Monitor renal function closely.  Avoid nephrotoxic drugs.  Renal dose medications  Consider Nephrology consult in case of worsening renal function      History of ulcerative colitis  S/p colectomy/ileostomy in the distant past      GERD (gastroesophageal reflux disease)        Essential hypertension  Continue carvedilol home dose      Type 2 diabetes mellitus with stage 3  chronic kidney disease, without long-term current use of insulin  Patient's FSGs are controlled on current medication regimen.  Last A1c reviewed-   Lab Results   Component Value Date    HGBA1C 7.0 (H) 12/06/2022     Most recent fingerstick glucose reviewed-   Recent Labs   Lab 02/08/23  2220 02/09/23  0837 02/09/23  1200 02/09/23  1644   POCTGLUCOSE 178* 68* 72 85     Current correctional scale  none    Antihyperglycemics (From admission, onward)    None        Hold Oral hypoglycemics while patient is in the hospital.      VTE Risk Mitigation (From admission, onward)         Ordered     enoxaparin injection 30 mg  Daily         02/09/23 1138     IP VTE HIGH RISK PATIENT  Once         02/09/23 1138     Place sequential compression device  Until discontinued         02/09/23 1138               Discussed code status briefly with the patient.  He became tearful as he recollected decisions he had to maker is wife 2 years ago.  He would like to remain a full code at this time and defer any further decision making to his daughter Latisha.    Georgia Parikh MD  Department of Hospital Medicine   [facility]

## 2023-02-11 LAB
ALBUMIN SERPL BCP-MCNC: 2.7 G/DL (ref 3.5–5.2)
ALP SERPL-CCNC: 303 U/L (ref 55–135)
ALT SERPL W/O P-5'-P-CCNC: 153 U/L (ref 10–44)
ANION GAP SERPL CALC-SCNC: 12 MMOL/L (ref 8–16)
AST SERPL-CCNC: 72 U/L (ref 10–40)
BILIRUB SERPL-MCNC: 3.1 MG/DL (ref 0.1–1)
BUN SERPL-MCNC: 23 MG/DL (ref 8–23)
CALCIUM SERPL-MCNC: 8.6 MG/DL (ref 8.7–10.5)
CHLORIDE SERPL-SCNC: 105 MMOL/L (ref 95–110)
CO2 SERPL-SCNC: 24 MMOL/L (ref 23–29)
CREAT SERPL-MCNC: 2.4 MG/DL (ref 0.5–1.4)
EST. GFR  (NO RACE VARIABLE): 28 ML/MIN/1.73 M^2
GLUCOSE SERPL-MCNC: 131 MG/DL (ref 70–110)
POCT GLUCOSE: 125 MG/DL (ref 70–110)
POCT GLUCOSE: 133 MG/DL (ref 70–110)
POCT GLUCOSE: 137 MG/DL (ref 70–110)
POCT GLUCOSE: 65 MG/DL (ref 70–110)
POCT GLUCOSE: 84 MG/DL (ref 70–110)
POTASSIUM SERPL-SCNC: 3.6 MMOL/L (ref 3.5–5.1)
PROT SERPL-MCNC: 6.3 G/DL (ref 6–8.4)
SODIUM SERPL-SCNC: 141 MMOL/L (ref 136–145)

## 2023-02-11 PROCEDURE — 25000003 PHARM REV CODE 250: Mod: HCNC | Performed by: STUDENT IN AN ORGANIZED HEALTH CARE EDUCATION/TRAINING PROGRAM

## 2023-02-11 PROCEDURE — 36415 COLL VENOUS BLD VENIPUNCTURE: CPT | Mod: HCNC | Performed by: STUDENT IN AN ORGANIZED HEALTH CARE EDUCATION/TRAINING PROGRAM

## 2023-02-11 PROCEDURE — C9113 INJ PANTOPRAZOLE SODIUM, VIA: HCPCS | Mod: HCNC | Performed by: STUDENT IN AN ORGANIZED HEALTH CARE EDUCATION/TRAINING PROGRAM

## 2023-02-11 PROCEDURE — 96374 THER/PROPH/DIAG INJ IV PUSH: CPT

## 2023-02-11 PROCEDURE — 96372 THER/PROPH/DIAG INJ SC/IM: CPT | Performed by: STUDENT IN AN ORGANIZED HEALTH CARE EDUCATION/TRAINING PROGRAM

## 2023-02-11 PROCEDURE — 99232 PR SUBSEQUENT HOSPITAL CARE,LEVL II: ICD-10-PCS | Mod: HCNC,,, | Performed by: INTERNAL MEDICINE

## 2023-02-11 PROCEDURE — 63600175 PHARM REV CODE 636 W HCPCS: Mod: HCNC | Performed by: STUDENT IN AN ORGANIZED HEALTH CARE EDUCATION/TRAINING PROGRAM

## 2023-02-11 PROCEDURE — 87040 BLOOD CULTURE FOR BACTERIA: CPT | Mod: HCNC | Performed by: STUDENT IN AN ORGANIZED HEALTH CARE EDUCATION/TRAINING PROGRAM

## 2023-02-11 PROCEDURE — 99232 SBSQ HOSP IP/OBS MODERATE 35: CPT | Mod: HCNC,,, | Performed by: INTERNAL MEDICINE

## 2023-02-11 PROCEDURE — G0378 HOSPITAL OBSERVATION PER HR: HCPCS | Mod: HCNC

## 2023-02-11 PROCEDURE — 80053 COMPREHEN METABOLIC PANEL: CPT | Mod: HCNC | Performed by: STUDENT IN AN ORGANIZED HEALTH CARE EDUCATION/TRAINING PROGRAM

## 2023-02-11 RX ORDER — AMLODIPINE BESYLATE 5 MG/1
5 TABLET ORAL DAILY
Status: DISCONTINUED | OUTPATIENT
Start: 2023-02-11 | End: 2023-02-16 | Stop reason: HOSPADM

## 2023-02-11 RX ORDER — PANTOPRAZOLE SODIUM 40 MG/10ML
40 INJECTION, POWDER, LYOPHILIZED, FOR SOLUTION INTRAVENOUS DAILY
Status: DISCONTINUED | OUTPATIENT
Start: 2023-02-11 | End: 2023-02-14

## 2023-02-11 RX ORDER — POTASSIUM CHLORIDE 20 MEQ/1
20 TABLET, EXTENDED RELEASE ORAL ONCE
Status: COMPLETED | OUTPATIENT
Start: 2023-02-11 | End: 2023-02-11

## 2023-02-11 RX ORDER — MECLIZINE HYDROCHLORIDE 25 MG/1
25 TABLET ORAL 3 TIMES DAILY PRN
Status: DISCONTINUED | OUTPATIENT
Start: 2023-02-11 | End: 2023-02-16 | Stop reason: HOSPADM

## 2023-02-11 RX ADMIN — ASPIRIN 81 MG: 81 TABLET, COATED ORAL at 08:02

## 2023-02-11 RX ADMIN — PANTOPRAZOLE SODIUM 40 MG: 40 INJECTION, POWDER, LYOPHILIZED, FOR SOLUTION INTRAVENOUS at 05:02

## 2023-02-11 RX ADMIN — AMIODARONE HYDROCHLORIDE 200 MG: 200 TABLET ORAL at 08:02

## 2023-02-11 RX ADMIN — SIMETHICONE 80 MG: 80 TABLET, CHEWABLE ORAL at 12:02

## 2023-02-11 RX ADMIN — AMLODIPINE BESYLATE 5 MG: 5 TABLET ORAL at 12:02

## 2023-02-11 RX ADMIN — ENOXAPARIN SODIUM 30 MG: 40 INJECTION SUBCUTANEOUS at 04:02

## 2023-02-11 RX ADMIN — POTASSIUM CHLORIDE 20 MEQ: 1500 TABLET, EXTENDED RELEASE ORAL at 12:02

## 2023-02-11 RX ADMIN — BRIMONIDINE TARTRATE 1 DROP: 2 SOLUTION OPHTHALMIC at 08:02

## 2023-02-11 RX ADMIN — ALLOPURINOL 300 MG: 100 TABLET ORAL at 08:02

## 2023-02-11 RX ADMIN — CARVEDILOL 6.25 MG: 6.25 TABLET, FILM COATED ORAL at 08:02

## 2023-02-11 RX ADMIN — CARVEDILOL 6.25 MG: 6.25 TABLET, FILM COATED ORAL at 04:02

## 2023-02-11 RX ADMIN — HYDRALAZINE HYDROCHLORIDE 5 MG: 20 INJECTION, SOLUTION INTRAMUSCULAR; INTRAVENOUS at 08:02

## 2023-02-11 NOTE — ASSESSMENT & PLAN NOTE
LFTs downttrend  EUS without obstruction but with seemingly prolapse of the papilla    Discussed with aes    They feel gen surg eval is warranted.    I would consider CT with po contrast if abd pain continues  Appreciate gen surg recs  Trend lfts  protoinx iv daily  Supportive care

## 2023-02-11 NOTE — PROGRESS NOTES
[facility] Medicine  Progress Note    Patient Name: Jarrett Charlton Jr.  MRN: [id number]  Patient Class: OP- Observation   Admission Date: 2/9/2023  Length of Stay: 1 days  Attending Physician: Georgia Parikh MD  Primary Care Provider: Poli Gonzalez MD        Subjective:     Principal Problem:Cholestatic jaundice      HPI:  72-year-old male with PMH of diabetes mellitus, CKD, hypertension, hyperlipidemia, remote history of ulcerative colitis status post total colectomy with diverting ileostomy who presented to outside hospital ER with abdominal pain and found to have cholestatic pattern elevated LFTs with mild dilatation of common bile duct on abdominal ultrasound.  Follow-up MRCP showed mild nonspecific dilatation of CBD without filling defect or intrahepatic biliary ductal dilatation.  No cholelithiasis or evidence of acute cholecystitis.  Patient was transferred to [facility] for GI/surgery evaluation.    During my encounter, patient denies any abdominal pain but reports excessive generalized itching for which she had received Benadryl prior to transfer.  Nausea associated with abdominal pain has now resolved.  Denies any diarrhea, fever.  Denies any chest pain or shortness of breath.  No family at bedside.      Overview/Hospital Course:  No notes on file    Interval History:  Complains abdominal bloating and discomfort.  States his symptoms are similar to those with brought him to the ER. Reports poor appetite and headache.     Review of Systems   Respiratory:  Negative for shortness of breath.    Gastrointestinal:  Negative for abdominal pain.   Neurological:  Positive for headaches.   Objective:     Vital Signs (Most Recent):  Temp: 99.9 °F (37.7 °C) (02/11/23 1653)  Pulse: 60 (02/11/23 1653)  Resp: 15 (02/11/23 1653)  BP: (!) 175/81 (02/11/23 1653)  SpO2: 96 % (02/11/23 1653)   Vital Signs (24h Range):  Temp:  [98.3 °F (36.8 °C)-100.1 °F (37.8 °C)] 99.9 °F (37.7 °C)  Pulse:  [56-65]  60  Resp:  [15-20] 15  SpO2:  [94 %-97 %] 96 %  BP: (140-206)/(80-94) 175/81     Weight: 94.3 kg (207 lb 14.3 oz)  Body mass index is 26.69 kg/m².    Intake/Output Summary (Last 24 hours) at 2/11/2023 1702  Last data filed at 2/11/2023 0707  Gross per 24 hour   Intake 225 ml   Output --   Net 225 ml        Physical Exam  Vitals and nursing note reviewed.   Constitutional:       General: He is not in acute distress.     Appearance: He is well-developed. He is not diaphoretic.   HENT:      Mouth/Throat:      Mouth: Mucous membranes are moist.   Eyes:      General: Scleral icterus present.   Neck:      Trachea: No tracheal deviation.   Cardiovascular:      Rate and Rhythm: Normal rate and regular rhythm.      Heart sounds: Normal heart sounds. No murmur heard.  Pulmonary:      Effort: Pulmonary effort is normal. No respiratory distress.      Breath sounds: Normal breath sounds. No wheezing.   Abdominal:      General: Bowel sounds are normal. There is no distension.      Palpations: Abdomen is soft.      Tenderness: There is abdominal tenderness.   Musculoskeletal:      Cervical back: Neck supple.      Right lower leg: No edema.      Left lower leg: No edema.   Skin:     General: Skin is warm and dry.      Findings: No rash.   Neurological:      Mental Status: He is alert and oriented to person, place, and time.   Psychiatric:         Behavior: Behavior normal.       Significant Labs: All pertinent labs within the past 24 hours have been reviewed.    Significant Imaging: I have reviewed all pertinent imaging results/findings within the past 24 hours.      Assessment/Plan:      * Cholestatic jaundice  Elevated LFTs, cholestatic pattern -improving overall  Dilated duct seen on right upper quadrant ultrasound/MRCP     S/p ERCP/EUS on 02/10 without obstruction   GI on board.  Per EMS, General surgery evaluation warranted  Generally surgery consult place.  Earliest patient can have surgery is Monday on 02/13  IV PPI daily  added  Trend CMP  Symptomatic management   Low-grade fever noted.  Add blood cultures.  Consider adding antibiotics for intra-abdominal coverage        Hypoglycemia  Hypoglycemic likely in setting of NPO status with liver dysfunction. Not received any insulin    Improved on diet      Paroxysmal ventricular tachycardia  Continue amiodarone   Telemetry  Ensure electrolytes are optimized      Prostate cancer  S/p TURP  Appears to be on leuprolide injections per MAR    History of gout  Chronic, stable.  Continue allopurinol      Abnormal LFTs (liver function tests)  As above      CAD (coronary artery disease)  No chest pain.  Troponin negative   Continue aspirin, beta-blocker.  Holding statin due to elevated LFT      CKD (chronic kidney disease) stage 3, GFR 30-59 ml/min  Patient follows with Nephrology at Dr. Josef Degroot.  Per patient he gets normal saline IV twice a week to avoid dehydration secondary to losses from his ostomy.    Renal function currently appears to be at baseline    P.r.n. IV fluids  Monitor renal function closely.  Avoid nephrotoxic drugs.  Renal dose medications  Consider Nephrology consult in case of worsening renal function      History of ulcerative colitis  S/p colectomy/ileostomy in the distant past      GERD (gastroesophageal reflux disease)        Essential hypertension  Continue carvedilol home dose    Blood pressure consistently elevated to systolic 170s.  At times patient has abdominal discomfort while blood pressure remains elevated even if the patient is not in any pain.  Unclear if the patient needs additional antihypertensives but given headache with persistently elevated blood pressures, we will add amlodipine 5 mg q.d. at this time     PRN hydralazine for SBP>160      Type 2 diabetes mellitus with stage 3 chronic kidney disease, without long-term current use of insulin  Patient's FSGs are controlled on current medication regimen.  Last A1c reviewed-   Lab Results   Component Value  Date    HGBA1C 7.0 (H) 12/06/2022     Most recent fingerstick glucose reviewed-   Recent Labs   Lab 02/11/23  0546 02/11/23  0650 02/11/23  1209 02/11/23  1651   POCTGLUCOSE 65* 84 125* 137*     Current correctional scale  none    Antihyperglycemics (From admission, onward)    Start     Stop Route Frequency Ordered    02/09/23 2158  insulin aspart U-100 pen 0-5 Units         -- SubQ Before meals & nightly PRN 02/09/23 2059        Hold Oral hypoglycemics while patient is in the hospital.      VTE Risk Mitigation (From admission, onward)         Ordered     enoxaparin injection 30 mg  Daily         02/09/23 1138     IP VTE HIGH RISK PATIENT  Once         02/09/23 1138     Place sequential compression device  Until discontinued         02/09/23 1138                Discharge Planning   JOSÉ MIGUEL: 2/11/2023     Code Status: Full Code   Is the patient medically ready for discharge?:     Reason for patient still in hospital (select all that apply): Patient trending condition and Consult recommendations  Discharge Plan A: Home with family            Georgia Parikh MD  Department of Hospital Medicine   [facility]

## 2023-02-11 NOTE — ASSESSMENT & PLAN NOTE
Continue carvedilol home dose    Blood pressure consistently elevated to systolic 170s.  At times patient has abdominal discomfort while blood pressure remains elevated even if the patient is not in any pain.  Unclear if the patient needs additional antihypertensives but given headache with persistently elevated blood pressures, we will add amlodipine 5 mg q.d. at this time     PRN hydralazine for SBP>160

## 2023-02-11 NOTE — PROGRESS NOTES
[facility]  [facility] Note    Patient Name: Jarrett Charlton Jr.  MRN: [id number]  Admission Date: 2/9/2023  Hospital Length of Stay: 1 days  Code Status: Full Code   Attending Provider: Georgia Parikh MD  Consulting Provider: Jamar Wu MD  Primary Care Physician: Poli Gonzalez MD  Principal Problem: Cholestatic jaundice      Subjective:     Interval History:   Abd pain vague. Some bloating has returned. Feels nausea. NNo vomiting. Passing flatus.  Lfts continued down    Eus reviewed yesterday    Review of Systems   Constitutional:  Positive for appetite change and fatigue. Negative for chills and fever.   Respiratory:  Negative for choking and chest tightness.    Gastrointestinal:  Positive for abdominal distention and nausea. Negative for abdominal pain and vomiting.   Neurological:  Negative for dizziness and headaches.   Psychiatric/Behavioral:  Negative for agitation and behavioral problems.    Objective:     Vital Signs (Most Recent):  Temp: 100.1 °F (37.8 °C) (02/11/23 1210)  Pulse: 64 (02/11/23 1210)  Resp: 16 (02/11/23 1210)  BP: (!) 171/82 (02/11/23 1210)  SpO2: 95 % (02/11/23 1210)   Vital Signs (24h Range):  Temp:  [97 °F (36.1 °C)-100.1 °F (37.8 °C)] 100.1 °F (37.8 °C)  Pulse:  [60-75] 64  Resp:  [16-20] 16  SpO2:  [94 %-97 %] 95 %  BP: (140-206)/(80-94) 171/82     Weight: 94.3 kg (207 lb 14.3 oz) (02/10/23 0700)  Body mass index is 26.69 kg/m².      Intake/Output Summary (Last 24 hours) at 2/11/2023 1520  Last data filed at 2/11/2023 0707  Gross per 24 hour   Intake 225 ml   Output --   Net 225 ml       Lines/Drains/Airways       Drain  Duration                  Ileostomy 01/07/19 1101 RLQ 1496 days              Peripheral Intravenous Line  Duration                  Peripheral IV - Single Lumen 02/08/23 1417 20 G Left Forearm 3 days                    Physical Exam  Vitals reviewed.   Constitutional:       Appearance: He is not toxic-appearing.   HENT:      Head:  Normocephalic and atraumatic.   Eyes:      General: No scleral icterus.     Conjunctiva/sclera: Conjunctivae normal.   Abdominal:      Palpations: Abdomen is soft.      Comments: Ileostomy rlq ; soft abd , nonrigid , no acute abd   Skin:     General: Skin is dry.      Coloration: Skin is not pale.   Neurological:      Mental Status: He is alert and oriented to person, place, and time. Mental status is at baseline.       Significant Labs:  Blood Culture: No results for input(s): LABBLOO in the last 48 hours.  CBC: No results for input(s): WBC, HGB, HCT, PLT in the last 48 hours.  BMP:   Recent Labs   Lab 02/11/23  0942   *      K 3.6      CO2 24   BUN 23   CREATININE 2.4*   CALCIUM 8.6*     CMP:   Recent Labs   Lab 02/11/23  0942   *   CALCIUM 8.6*   ALBUMIN 2.7*   PROT 6.3      K 3.6   CO2 24      BUN 23   CREATININE 2.4*   ALKPHOS 303*   *   AST 72*   BILITOT 3.1*     Coagulation: No results for input(s): PT, INR, APTT in the last 48 hours.  CRP: No results for input(s): CRP in the last 48 hours.  ESR: No results for input(s): SEDRATE in the last 48 hours.  H.Pylori Ab IgG: No results for input(s): HPYLORIIGG in the last 48 hours.  Lipase: No results for input(s): LIPASE in the last 48 hours.      Significant Imaging:  Imaging results within the past 24 hours have been reviewed.    Assessment/Plan:     * Cholestatic jaundice  LFTs downttrend  EUS without obstruction but with seemingly prolapse of the papilla    Discussed with aes    They feel gen surg eval is warranted.    I would consider CT with po contrast if abd pain continues  Appreciate gen surg recs  Trend lfts  protoinx iv daily  Supportive care          Thank you for your consult. I will follow-up with patient. Please contact us if you have any additional questions.    Jamar Wu MD  Gastroenterology  [facility]

## 2023-02-11 NOTE — ASSESSMENT & PLAN NOTE
Elevated LFTs, cholestatic pattern -improving overall  Dilated duct seen on right upper quadrant ultrasound/MRCP     S/p ERCP/EUS on 02/10 without obstruction   GI on board.  Per EMS, General surgery evaluation warranted  Generally surgery consult place.  Earliest patient can have surgery is Monday on 02/13  IV PPI daily added  Trend CMP  Symptomatic management   Low-grade fever noted.  Add blood cultures.  Consider adding antibiotics for intra-abdominal coverage

## 2023-02-11 NOTE — ASSESSMENT & PLAN NOTE
Patient's FSGs are controlled on current medication regimen.  Last A1c reviewed-   Lab Results   Component Value Date    HGBA1C 7.0 (H) 12/06/2022     Most recent fingerstick glucose reviewed-   Recent Labs   Lab 02/11/23  0546 02/11/23  0650 02/11/23  1209 02/11/23  1651   POCTGLUCOSE 65* 84 125* 137*     Current correctional scale  none    Antihyperglycemics (From admission, onward)    Start     Stop Route Frequency Ordered    02/09/23 2158  insulin aspart U-100 pen 0-5 Units         -- SubQ Before meals & nightly PRN 02/09/23 2059        Hold Oral hypoglycemics while patient is in the hospital.

## 2023-02-11 NOTE — SUBJECTIVE & OBJECTIVE
Interval History:  Complains abdominal bloating and discomfort.  States his symptoms are similar to those with brought him to the ER. Reports poor appetite and headache.     Review of Systems   Respiratory:  Negative for shortness of breath.    Gastrointestinal:  Negative for abdominal pain.   Neurological:  Positive for headaches.   Objective:     Vital Signs (Most Recent):  Temp: 99.9 °F (37.7 °C) (02/11/23 1653)  Pulse: 60 (02/11/23 1653)  Resp: 15 (02/11/23 1653)  BP: (!) 175/81 (02/11/23 1653)  SpO2: 96 % (02/11/23 1653)   Vital Signs (24h Range):  Temp:  [98.3 °F (36.8 °C)-100.1 °F (37.8 °C)] 99.9 °F (37.7 °C)  Pulse:  [56-65] 60  Resp:  [15-20] 15  SpO2:  [94 %-97 %] 96 %  BP: (140-206)/(80-94) 175/81     Weight: 94.3 kg (207 lb 14.3 oz)  Body mass index is 26.69 kg/m².    Intake/Output Summary (Last 24 hours) at 2/11/2023 1702  Last data filed at 2/11/2023 0707  Gross per 24 hour   Intake 225 ml   Output --   Net 225 ml        Physical Exam  Vitals and nursing note reviewed.   Constitutional:       General: He is not in acute distress.     Appearance: He is well-developed. He is not diaphoretic.   HENT:      Mouth/Throat:      Mouth: Mucous membranes are moist.   Eyes:      General: Scleral icterus present.   Neck:      Trachea: No tracheal deviation.   Cardiovascular:      Rate and Rhythm: Normal rate and regular rhythm.      Heart sounds: Normal heart sounds. No murmur heard.  Pulmonary:      Effort: Pulmonary effort is normal. No respiratory distress.      Breath sounds: Normal breath sounds. No wheezing.   Abdominal:      General: Bowel sounds are normal. There is no distension.      Palpations: Abdomen is soft.      Tenderness: There is abdominal tenderness.   Musculoskeletal:      Cervical back: Neck supple.      Right lower leg: No edema.      Left lower leg: No edema.   Skin:     General: Skin is warm and dry.      Findings: No rash.   Neurological:      Mental Status: He is alert and oriented to  person, place, and time.   Psychiatric:         Behavior: Behavior normal.       Significant Labs: All pertinent labs within the past 24 hours have been reviewed.    Significant Imaging: I have reviewed all pertinent imaging results/findings within the past 24 hours.

## 2023-02-11 NOTE — ASSESSMENT & PLAN NOTE
Hypoglycemic likely in setting of NPO status with liver dysfunction. Not received any insulin    Improved on diet

## 2023-02-11 NOTE — ASSESSMENT & PLAN NOTE
Patient follows with Nephrology at Dr. Josef Degroot.  Per patient he gets normal saline IV twice a week to avoid dehydration secondary to losses from his ostomy.    Renal function currently appears to be at baseline    P.r.n. IV fluids  Monitor renal function closely.  Avoid nephrotoxic drugs.  Renal dose medications  Consider Nephrology consult in case of worsening renal function

## 2023-02-12 ENCOUNTER — TELEPHONE (OUTPATIENT)
Dept: PRIMARY CARE CLINIC | Facility: CLINIC | Age: 73
End: 2023-02-12
Payer: MEDICARE

## 2023-02-12 LAB
ALBUMIN SERPL BCP-MCNC: 2.7 G/DL (ref 3.5–5.2)
ALP SERPL-CCNC: 379 U/L (ref 55–135)
ALT SERPL W/O P-5'-P-CCNC: 119 U/L (ref 10–44)
ANION GAP SERPL CALC-SCNC: 12 MMOL/L (ref 8–16)
AST SERPL-CCNC: 49 U/L (ref 10–40)
BASOPHILS # BLD AUTO: 0.01 K/UL (ref 0–0.2)
BASOPHILS NFR BLD: 0.3 % (ref 0–1.9)
BILIRUB SERPL-MCNC: 2.7 MG/DL (ref 0.1–1)
BUN SERPL-MCNC: 26 MG/DL (ref 8–23)
CALCIUM SERPL-MCNC: 9 MG/DL (ref 8.7–10.5)
CHLORIDE SERPL-SCNC: 104 MMOL/L (ref 95–110)
CO2 SERPL-SCNC: 24 MMOL/L (ref 23–29)
CREAT SERPL-MCNC: 2.5 MG/DL (ref 0.5–1.4)
DIFFERENTIAL METHOD: ABNORMAL
EOSINOPHIL # BLD AUTO: 0.1 K/UL (ref 0–0.5)
EOSINOPHIL NFR BLD: 2.2 % (ref 0–8)
ERYTHROCYTE [DISTWIDTH] IN BLOOD BY AUTOMATED COUNT: 15 % (ref 11.5–14.5)
EST. GFR  (NO RACE VARIABLE): 26 ML/MIN/1.73 M^2
GLUCOSE SERPL-MCNC: 157 MG/DL (ref 70–110)
HCT VFR BLD AUTO: 36.4 % (ref 40–54)
HGB BLD-MCNC: 11.6 G/DL (ref 14–18)
IMM GRANULOCYTES # BLD AUTO: 0.03 K/UL (ref 0–0.04)
IMM GRANULOCYTES NFR BLD AUTO: 0.8 % (ref 0–0.5)
LYMPHOCYTES # BLD AUTO: 1 K/UL (ref 1–4.8)
LYMPHOCYTES NFR BLD: 26.8 % (ref 18–48)
MCH RBC QN AUTO: 28.1 PG (ref 27–31)
MCHC RBC AUTO-ENTMCNC: 31.9 G/DL (ref 32–36)
MCV RBC AUTO: 88 FL (ref 82–98)
MONOCYTES # BLD AUTO: 0.4 K/UL (ref 0.3–1)
MONOCYTES NFR BLD: 10.9 % (ref 4–15)
NEUTROPHILS # BLD AUTO: 2.2 K/UL (ref 1.8–7.7)
NEUTROPHILS NFR BLD: 59 % (ref 38–73)
NRBC BLD-RTO: 0 /100 WBC
PLATELET # BLD AUTO: 153 K/UL (ref 150–450)
PMV BLD AUTO: 10.7 FL (ref 9.2–12.9)
POCT GLUCOSE: 130 MG/DL (ref 70–110)
POCT GLUCOSE: 131 MG/DL (ref 70–110)
POCT GLUCOSE: 139 MG/DL (ref 70–110)
POCT GLUCOSE: 151 MG/DL (ref 70–110)
POTASSIUM SERPL-SCNC: 4.2 MMOL/L (ref 3.5–5.1)
PROT SERPL-MCNC: 6.4 G/DL (ref 6–8.4)
RBC # BLD AUTO: 4.13 M/UL (ref 4.6–6.2)
SODIUM SERPL-SCNC: 140 MMOL/L (ref 136–145)
WBC # BLD AUTO: 3.66 K/UL (ref 3.9–12.7)

## 2023-02-12 PROCEDURE — 99232 PR SUBSEQUENT HOSPITAL CARE,LEVL II: ICD-10-PCS | Mod: HCNC,,, | Performed by: INTERNAL MEDICINE

## 2023-02-12 PROCEDURE — 85025 COMPLETE CBC W/AUTO DIFF WBC: CPT | Mod: HCNC | Performed by: STUDENT IN AN ORGANIZED HEALTH CARE EDUCATION/TRAINING PROGRAM

## 2023-02-12 PROCEDURE — 99223 PR INITIAL HOSPITAL CARE,LEVL III: ICD-10-PCS | Mod: HCNC,,, | Performed by: SURGERY

## 2023-02-12 PROCEDURE — 25000003 PHARM REV CODE 250: Mod: HCNC | Performed by: STUDENT IN AN ORGANIZED HEALTH CARE EDUCATION/TRAINING PROGRAM

## 2023-02-12 PROCEDURE — G0378 HOSPITAL OBSERVATION PER HR: HCPCS | Mod: HCNC

## 2023-02-12 PROCEDURE — 99232 SBSQ HOSP IP/OBS MODERATE 35: CPT | Mod: HCNC,,, | Performed by: INTERNAL MEDICINE

## 2023-02-12 PROCEDURE — 80053 COMPREHEN METABOLIC PANEL: CPT | Mod: HCNC | Performed by: STUDENT IN AN ORGANIZED HEALTH CARE EDUCATION/TRAINING PROGRAM

## 2023-02-12 PROCEDURE — C9113 INJ PANTOPRAZOLE SODIUM, VIA: HCPCS | Mod: HCNC | Performed by: STUDENT IN AN ORGANIZED HEALTH CARE EDUCATION/TRAINING PROGRAM

## 2023-02-12 PROCEDURE — 99223 1ST HOSP IP/OBS HIGH 75: CPT | Mod: HCNC,,, | Performed by: SURGERY

## 2023-02-12 PROCEDURE — 36415 COLL VENOUS BLD VENIPUNCTURE: CPT | Mod: HCNC | Performed by: STUDENT IN AN ORGANIZED HEALTH CARE EDUCATION/TRAINING PROGRAM

## 2023-02-12 PROCEDURE — 96372 THER/PROPH/DIAG INJ SC/IM: CPT | Performed by: STUDENT IN AN ORGANIZED HEALTH CARE EDUCATION/TRAINING PROGRAM

## 2023-02-12 PROCEDURE — 96360 HYDRATION IV INFUSION INIT: CPT | Mod: 59

## 2023-02-12 PROCEDURE — 96361 HYDRATE IV INFUSION ADD-ON: CPT

## 2023-02-12 PROCEDURE — 63600175 PHARM REV CODE 636 W HCPCS: Mod: HCNC | Performed by: STUDENT IN AN ORGANIZED HEALTH CARE EDUCATION/TRAINING PROGRAM

## 2023-02-12 RX ORDER — SUMATRIPTAN SUCCINATE 25 MG/1
25 TABLET ORAL DAILY PRN
Status: DISCONTINUED | OUTPATIENT
Start: 2023-02-12 | End: 2023-02-12

## 2023-02-12 RX ORDER — TRAMADOL HYDROCHLORIDE 50 MG/1
50 TABLET ORAL ONCE
Status: DISCONTINUED | OUTPATIENT
Start: 2023-02-12 | End: 2023-02-13

## 2023-02-12 RX ORDER — SODIUM CHLORIDE 9 MG/ML
INJECTION, SOLUTION INTRAVENOUS CONTINUOUS
Status: ACTIVE | OUTPATIENT
Start: 2023-02-12 | End: 2023-02-13

## 2023-02-12 RX ADMIN — SIMETHICONE 80 MG: 80 TABLET, CHEWABLE ORAL at 08:02

## 2023-02-12 RX ADMIN — ALLOPURINOL 300 MG: 100 TABLET ORAL at 08:02

## 2023-02-12 RX ADMIN — ENOXAPARIN SODIUM 30 MG: 40 INJECTION SUBCUTANEOUS at 04:02

## 2023-02-12 RX ADMIN — CARVEDILOL 6.25 MG: 6.25 TABLET, FILM COATED ORAL at 08:02

## 2023-02-12 RX ADMIN — AMIODARONE HYDROCHLORIDE 200 MG: 200 TABLET ORAL at 10:02

## 2023-02-12 RX ADMIN — PANTOPRAZOLE SODIUM 40 MG: 40 INJECTION, POWDER, LYOPHILIZED, FOR SOLUTION INTRAVENOUS at 08:02

## 2023-02-12 RX ADMIN — AMIODARONE HYDROCHLORIDE 200 MG: 200 TABLET ORAL at 08:02

## 2023-02-12 RX ADMIN — SODIUM CHLORIDE: 0.9 INJECTION, SOLUTION INTRAVENOUS at 06:02

## 2023-02-12 RX ADMIN — AMLODIPINE BESYLATE 5 MG: 5 TABLET ORAL at 08:02

## 2023-02-12 RX ADMIN — ASPIRIN 81 MG: 81 TABLET, COATED ORAL at 08:02

## 2023-02-12 RX ADMIN — BRIMONIDINE TARTRATE 1 DROP: 2 SOLUTION OPHTHALMIC at 10:02

## 2023-02-12 RX ADMIN — BRIMONIDINE TARTRATE 1 DROP: 2 SOLUTION OPHTHALMIC at 08:02

## 2023-02-12 RX ADMIN — BUTALBITAL, ACETAMINOPHEN, AND CAFFEINE 1 TABLET: 50; 325; 40 TABLET ORAL at 02:02

## 2023-02-12 RX ADMIN — ACETAMINOPHEN 650 MG: 325 TABLET ORAL at 04:02

## 2023-02-12 RX ADMIN — HYPROMELLOSE 2910 1 DROP: 5 SOLUTION/ DROPS OPHTHALMIC at 04:02

## 2023-02-12 NOTE — CONSULTS
"NEUROLOGY FLOOR CONSULT    Reason for consult:  headaches    HPI:     73 yo Male with hx below.  Presented to OSH on 2/9/23 to ER with abdominal pain and found to have cholestatic pattern elevated LFTs with mild dilatation of common bile duct on abdominal ultrasound. Pending cholecystectomy on 2/14/23. Neurology consulted 2/12/23 for headaches.     No hx of headaches prior to arrival. This one started when arriving in the ER on 2/9, reached maximal intensity to 10/10 over 3-4 hours and persisted for 3 days. +Light sensitivity. No nausea/vomiting. Bifrontal pain around his eyes bilaterally, though he cannot describe the quality. It went away completely for 1-2 days, then returned again today, got up to 8/10, then down again to about a 3/10. Currently it is around 7/10 but patient says "I'm able to function through it." Reports associated R-eye tearing and subsequent blurriness from that eye, otherwise no vision loss. Denies neck stiffness, jaw pain, pain with eye movements, ttp around the scalp bilaterally.     PMHx  Orthostatic syncope -- diagnosed by tilt table, follows with cardiology; midodrine, fludrocortisone   CAD s/p PCI (2017) -- ASA   Hyperlipidemia -- pravastatin   Ventricular tachycardia s/p LINQ device -- amiodarone, carvedilol   "Carotid stenosis"  Ascending aortic aneurysm  CKD-IIIb  Diabetes   Hx of ulcerative colitis s/p total colectomy with diverting ileostomy       Histories:     Allergies:  Crestor [rosuvastatin] and Lipitor [atorvastatin]    Current Medications:    Current Facility-Administered Medications   Medication Dose Route Frequency Provider Last Rate Last Admin    acetaminophen tablet 650 mg  650 mg Oral Q8H PRN Georgia Parikh MD   650 mg at 02/12/23 1624    allopurinoL tablet 300 mg  300 mg Oral Daily Georgia Parikh MD   300 mg at 02/12/23 0831    aluminum-magnesium hydroxide-simethicone 200-200-20 mg/5 mL suspension 30 mL  30 mL Oral QID PRN Georgia Parikh MD        amiodarone tablet " 200 mg  200 mg Oral BID Georgia Parikh MD   200 mg at 02/12/23 0831    amLODIPine tablet 5 mg  5 mg Oral Daily Georgia Parikh MD   5 mg at 02/12/23 0831    artificial tears 0.5 % ophthalmic solution 1 drop  1 drop Both Eyes BID Georgia Parikh MD        aspirin EC tablet 81 mg  81 mg Oral Daily Georgia Parikh MD   81 mg at 02/12/23 0831    brimonidine 0.2% ophthalmic solution 1 drop  1 drop Both Eyes BID Georgia Parikh MD   1 drop at 02/12/23 0831    butalbital-acetaminophen-caffeine -40 mg per tablet 1 tablet  1 tablet Oral Daily PRN Georgia Parikh MD   1 tablet at 02/12/23 1406    dextrose 10% bolus 125 mL 125 mL  12.5 g Intravenous PRN Georgia Parikh MD   Stopped at 02/10/23 0804    dextrose 10% bolus 125 mL 125 mL  12.5 g Intravenous PRN Ivette Landers NP        dextrose 10% bolus 250 mL 250 mL  25 g Intravenous PRN Georgia Parikh MD        dextrose 10% bolus 250 mL 250 mL  25 g Intravenous PRN Ivette Landers NP        enoxaparin injection 30 mg  30 mg Subcutaneous Daily Georgia Parikh MD   30 mg at 02/12/23 1602    glucagon (human recombinant) injection 1 mg  1 mg Intramuscular PRN Georgia Parikh MD        glucagon (human recombinant) injection 1 mg  1 mg Intramuscular PRN Ivette Landers NP        glucose chewable tablet 16 g  16 g Oral PRN Georgia Parikh MD        glucose chewable tablet 16 g  16 g Oral PRN Ivette Landers NP        glucose chewable tablet 24 g  24 g Oral PRN Georgia Parikh MD        glucose chewable tablet 24 g  24 g Oral PRN Ivette Landers NP        hydrALAZINE injection 5 mg  5 mg Intravenous Q8H PRN Georgia Parikh MD   5 mg at 02/11/23 0853    hydrOXYzine HCL tablet 50 mg  50 mg Oral TID PRN Georgia Parikh MD   50 mg at 02/09/23 2215    insulin aspart U-100 pen 0-5 Units  0-5 Units Subcutaneous QID (AC + HS) PRN Ivette Landers NP   1 Units at 02/09/23 2871    meclizine tablet 25 mg  25 mg Oral TID PRN Georgia Parikh MD        melatonin  tablet 6 mg  6 mg Oral Nightly PRN Georgia Parikh MD        naloxone 0.4 mg/mL injection 0.02 mg  0.02 mg Intravenous PRN Georgia Parikh MD        ondansetron disintegrating tablet 8 mg  8 mg Oral Q8H PRN Georgia Parikh MD        pantoprazole injection 40 mg  40 mg Intravenous Daily Georgia Parikh MD   40 mg at 02/12/23 0831    simethicone chewable tablet 80 mg  1 tablet Oral QID PRN Georgia Parikh MD   80 mg at 02/12/23 0830    sodium chloride 0.9% flush 3 mL  3 mL Intravenous Q12H PRN Georgia Parikh MD        sumatriptan tablet 25 mg  25 mg Oral Daily PRN Georgia Parikh MD         Facility-Administered Medications Ordered in Other Encounters   Medication Dose Route Frequency Provider Last Rate Last Admin    sodium chloride 0.9% in 1,000 mL infusion   Intravenous Continuous Paper Order           Past Medical/Surgical/Family History:  Medical:   Past Medical History:   Diagnosis Date    Basal cell carcinoma     BPH (benign prostatic hyperplasia)     s/p TURP    Carotid stenosis     Chronic kidney disease     Claudication     Coronary artery disease     DDD (degenerative disc disease) 10/21/2013    Diabetes mellitus with renal complications     Disc disease, degenerative, cervical     Encounter for blood transfusion     GERD (gastroesophageal reflux disease)     Gout, chronic     History of ulcerative colitis     s/p colectomy and ileostomy    HLD (hyperlipidemia)     HTN (hypertension)     Ileostomy in place 1982    RBBB     Squamous cell carcinoma 03/08/2018    Left superior helix near insertion    Squamous cell carcinoma 04/12/2018    Left forearm x 5    Ventricular tachycardia       Surgeries:   Past Surgical History:   Procedure Laterality Date    cardiac stents      CATARACT EXTRACTION Bilateral     CERVICAL FUSION      colectomy and ileostomy  1985    ENDOSCOPIC ULTRASOUND OF UPPER GASTROINTESTINAL TRACT N/A 2/10/2023    Procedure: ULTRASOUND, UPPER GI TRACT, ENDOSCOPIC;  Surgeon: Poli Fuller MD;   Location: [facility] ENDO;  Service: Endoscopy;  Laterality: N/A;    ERCP N/A 2/10/2023    Procedure: ERCP (ENDOSCOPIC RETROGRADE CHOLANGIOPANCREATOGRAPHY);  Surgeon: Poli Fuller MD;  Location: [facility];  Service: Endoscopy;  Laterality: N/A;    EXCISION OF PAROTID GLAND Left 12/18/2020    Procedure: EXCISION, PAROTID GLAND;  Surgeon: Michael Pinzon MD;  Location: [facility] OR 2ND FLR;  Service: ENT;  Laterality: Left;    INSERTION OF IMPLANTABLE LOOP RECORDER  06/07/2021    INSERTION OF IMPLANTABLE LOOP RECORDER Left 6/7/2021    Procedure: INSERTION, IMPLANTABLE LOOP RECORDER;  Surgeon: Romario Dao MD;  Location: [facility] CATH LAB;  Service: Cardiology;  Laterality: Left;    LEFT HEART CATHETERIZATION Right 4/15/2021    Procedure: CATHETERIZATION, HEART, LEFT;  Surgeon: Marcio Jones MD;  Location: [facility] CATH LAB;  Service: Cardiology;  Laterality: Right;    LYSIS OF ADHESIONS N/A 11/9/2020    Procedure: LYSIS, ADHESIONS,  ERAS low;  Surgeon: CED Haley MD;  Location: [facility] OR 2ND FLR;  Service: Colon and Rectal;  Laterality: N/A;    POUCHOSCOPY N/A 4/6/2022    Procedure: ENDOSCOPY, POUCH, SMALL INTESTINE, DIAGNOSTIC;  Surgeon: Dieter Juarez MD;  Location: [facility] (2ND FLR);  Service: Endoscopy;  Laterality: N/A;    REPAIR, HERNIA, PARASTOMAL N/A 11/9/2020    Procedure: REPAIR, HERNIA, PARASTOMAL;  Surgeon: CED Haley MD;  Location: [facility] OR 2ND FLR;  Service: Colon and Rectal;  Laterality: N/A;    TRANSURETHRAL RESECTION OF PROSTATE (TURP) WITHOUT USE OF LASER N/A 1/23/2019    Procedure: TURP, WITHOUT USING LASER BIPOLAR;  Surgeon: Catarino Mota MD;  Location: [facility] OR 1ST FLR;  Service: Urology;  Laterality: N/A;  1.5 HOURS      Family:   Family History   Problem Relation Age of Onset    Cancer Father     Diabetes Father     Dementia Mother     Melanoma Neg Hx     Hypertension Neg Hx     Arthritis Neg Hx          Current Evaluation:     Vital Signs:   Vitals:    02/12/23 1539   BP:     Pulse: (!) 56   Resp:    Temp:         Neurological Examination   NEUROLOGIC EXAM  Mental status  AAOx4  Language is fluent with intact comprehension  Working and remote memory intact    Cranial nerves   Pupils symmetric, reactive; VFs full  EOMIs, no nystagmus or opthalmoplegia, and no ptosis   No facial asymmetry  V1-V3 intact to light touch   Symmetric shoulder shrug  Tongue midline without deviations    No ttp in the temporal regions   No neck stiffness on passive ROM testing     Motor  Normal, symmetric bulk tone   No pronator drift     Sensory  SILT    Reflexes  symmetric throughout without UMN signs         LABORATORY STUDIES:  Reviewed     RADIOLOGY STUDIES:  I have personally reviewed the images performed.     CT head (2/12/23)  Unremarkable       Assessment:  P     73 yo Male presented to OSH on 2/9/23 to ER with abdominal pain and found to have cholestatic pattern elevated LFTs with mild dilatation of common bile duct on abdominal ultrasound. Pending cholecystectomy on 2/14/23. Neurology consulted 2/12/23 for new headaches which he has never had previously. It has responded decently to Tylenol thus far.     CT head is unremarkable.     Considerations   Primary headache with migrainous features   Giant cell arteritis should be considered in an 72 year old with new onset severe headaches, but absence of prominent vision loss, jaw claudication, and no ttp in the temporal regions is reassuring  Other causes of meningeal irritation have been considered, though the absence of neck stiffness is reassuring   Other vascular etiologies have been considered (eg dissection, aneurysm), though the absence of neck stiffness and/or other focal neurologic findings (eg ptosis, opthalmoplegia) is reassuring     Recommendations  -would continue conservative treatment with alternating Tylenol/NSAIDs if no contraindications; prn IV or IM Toradol is an option   -would also recommend maintenance IVFs as tolerated and if no  contraindications   -would avoid opiates for headache management   -would complete workup with ESR; if it is >50 then would have to consider empiric tx with high dose steroids for GCA, and assess for improvement, especially if pt fails to respond to conservative measures per above  -MRI brain, vessel imaging +/- LP can also be considered if headaches fail to improve with conservative management per above       Differential diagnosis was explained to the patient. All questions were answered. Patient understood and agreed to adhere to plan.       Case discussed with Dr. Ocasio.    Juan Carlos Prather,   [facility], PGY-IV

## 2023-02-12 NOTE — SUBJECTIVE & OBJECTIVE
Subjective:     Interval History:   No events  Still some bloat  Seen by surgeon  No vomit , no radiating symptosm. Upper abd distress with some assoc distention    Review of Systems   Constitutional:  Positive for appetite change and fatigue. Negative for chills and fever.   Respiratory:  Negative for choking and chest tightness.    Gastrointestinal:  Positive for abdominal distention and nausea. Negative for abdominal pain and vomiting.   Neurological:  Negative for dizziness and headaches.   Psychiatric/Behavioral:  Negative for agitation and behavioral problems.    Objective:     Vital Signs (Most Recent):  Temp: 96.1 °F (35.6 °C) (02/12/23 1214)  Pulse: (!) 49 (02/12/23 1214)  Resp: 16 (02/12/23 1214)  BP: 120/64 (02/12/23 1214)  SpO2: 95 % (02/12/23 1214)   Vital Signs (24h Range):  Temp:  [96.1 °F (35.6 °C)-99.9 °F (37.7 °C)] 96.1 °F (35.6 °C)  Pulse:  [47-72] 49  Resp:  [15-20] 16  SpO2:  [94 %-98 %] 95 %  BP: (120-183)/(64-88) 120/64     Weight: 94.3 kg (207 lb 14.3 oz) (02/10/23 0700)  Body mass index is 26.69 kg/m².      Intake/Output Summary (Last 24 hours) at 2/12/2023 1520  Last data filed at 2/12/2023 1245  Gross per 24 hour   Intake 625 ml   Output 500 ml   Net 125 ml       Lines/Drains/Airways       Drain  Duration                  Ileostomy 01/07/19 1101 RLQ 1497 days              Peripheral Intravenous Line  Duration                  Peripheral IV - Single Lumen 02/12/23 1104 20 G Right;Medial Wrist <1 day                    Physical Exam  Vitals reviewed.   Constitutional:       Appearance: He is not toxic-appearing.   HENT:      Head: Normocephalic and atraumatic.   Eyes:      General: No scleral icterus.     Conjunctiva/sclera: Conjunctivae normal.   Abdominal:      Palpations: Abdomen is soft.      Comments: Ileostomy rlq ; soft abd , nonrigid , no acute abd   Skin:     General: Skin is dry.      Coloration: Skin is not pale.   Neurological:      Mental Status: He is alert and oriented to  person, place, and time. Mental status is at baseline.       Significant Labs:  CBC:   Recent Labs   Lab 02/12/23  0648   WBC 3.66*   HGB 11.6*   HCT 36.4*        BMP:   Recent Labs   Lab 02/12/23  0648   *      K 4.2      CO2 24   BUN 26*   CREATININE 2.5*   CALCIUM 9.0     CMP:   Recent Labs   Lab 02/12/23  0648   *   CALCIUM 9.0   ALBUMIN 2.7*   PROT 6.4      K 4.2   CO2 24      BUN 26*   CREATININE 2.5*   ALKPHOS 379*   *   AST 49*   BILITOT 2.7*         Significant Imaging:  Imaging results within the past 24 hours have been reviewed.

## 2023-02-12 NOTE — ASSESSMENT & PLAN NOTE
Blood pressure consistently elevated to systolic 170s.  At times patient has abdominal discomfort while blood pressure remains elevated even if the patient is not in any pain.  Unclear if the patient needs additional antihypertensives but given headache with persistently elevated blood pressures, amlodipine 5 mg q.d. added     Holding Coreg due to bradycardia.  Elevated bile salts can contribute to bradycardia in this clinical setting.    PRN hydralazine for SBP>160

## 2023-02-12 NOTE — ASSESSMENT & PLAN NOTE
Elevated LFTs, cholestatic pattern -improving overall  Dilated duct seen on right upper quadrant ultrasound/MRCP     S/p ERCP/EUS on 02/10 without obstruction   GI on board.  Per EMS, General surgery evaluation warranted  Generally surgery consult - tentative plan for cholecystectomy which likely has to be open surgery on Tuesday 2/14  IV PPI daily added  Trend CMP  Symptomatic management   Noted to have low-grade fevers initially but afebrile overnight.  Blood cultures showed no growth till date.  Will hold off on antibiotics at this time

## 2023-02-12 NOTE — PROGRESS NOTES
[facility] Medicine  Progress Note    Patient Name: Jarrett Charlton Jr.  MRN: [id number]  Patient Class: OP- Observation   Admission Date: 2/9/2023  Length of Stay: 1 days  Attending Physician: Georgia Parikh MD  Primary Care Provider: Poli Gonzalez MD        Subjective:     Principal Problem:Cholestatic jaundice      HPI:  72-year-old male with PMH of diabetes mellitus, CKD, hypertension, hyperlipidemia, remote history of ulcerative colitis status post total colectomy with diverting ileostomy who presented to outside hospital ER with abdominal pain and found to have cholestatic pattern elevated LFTs with mild dilatation of common bile duct on abdominal ultrasound.  Follow-up MRCP showed mild nonspecific dilatation of CBD without filling defect or intrahepatic biliary ductal dilatation.  No cholelithiasis or evidence of acute cholecystitis.  Patient was transferred to [facility] for GI/surgery evaluation.    During my encounter, patient denies any abdominal pain but reports excessive generalized itching for which she had received Benadryl prior to transfer.  Nausea associated with abdominal pain has now resolved.  Denies any diarrhea, fever.  Denies any chest pain or shortness of breath.  No family at bedside.      Overview/Hospital Course:  No notes on file    Interval History:  No acute events overnight.  Intermittently has abdominal discomfort as afraid to eat.    Review of Systems   Respiratory:  Negative for shortness of breath.    Gastrointestinal:  Positive for abdominal pain.   Objective:     Vital Signs (Most Recent):  Temp: 96.1 °F (35.6 °C) (02/12/23 1214)  Pulse: (!) 49 (02/12/23 1214)  Resp: 16 (02/12/23 1214)  BP: 120/64 (02/12/23 1214)  SpO2: 95 % (02/12/23 1214)   Vital Signs (24h Range):  Temp:  [96.1 °F (35.6 °C)-99.9 °F (37.7 °C)] 96.1 °F (35.6 °C)  Pulse:  [47-72] 49  Resp:  [15-20] 16  SpO2:  [94 %-98 %] 95 %  BP: (120-183)/(64-88) 120/64     Weight: 94.3 kg (207 lb 14.3  oz)  Body mass index is 26.69 kg/m².    Intake/Output Summary (Last 24 hours) at 2/12/2023 1457  Last data filed at 2/12/2023 1245  Gross per 24 hour   Intake 625 ml   Output 500 ml   Net 125 ml        Physical Exam  Vitals and nursing note reviewed.   Constitutional:       General: He is not in acute distress.     Appearance: He is well-developed. He is not diaphoretic.      Comments: Elderly male   HENT:      Mouth/Throat:      Mouth: Mucous membranes are moist.   Eyes:      General: Scleral icterus present.   Neck:      Trachea: No tracheal deviation.   Cardiovascular:      Rate and Rhythm: Regular rhythm. Bradycardia present.      Heart sounds: Normal heart sounds. No murmur heard.  Pulmonary:      Effort: Pulmonary effort is normal. No respiratory distress.      Breath sounds: Normal breath sounds. No wheezing.   Abdominal:      General: Bowel sounds are normal. There is no distension.      Palpations: Abdomen is soft.      Tenderness: There is abdominal tenderness.   Musculoskeletal:      Cervical back: Neck supple.      Right lower leg: No edema.      Left lower leg: No edema.   Skin:     General: Skin is warm and dry.      Findings: No rash.   Neurological:      Mental Status: He is alert and oriented to person, place, and time.   Psychiatric:         Behavior: Behavior normal.       Significant Labs: All pertinent labs within the past 24 hours have been reviewed.    Significant Imaging: I have reviewed all pertinent imaging results/findings within the past 24 hours.      Assessment/Plan:      * Cholestatic jaundice  Elevated LFTs, cholestatic pattern -improving overall  Dilated duct seen on right upper quadrant ultrasound/MRCP     S/p ERCP/EUS on 02/10 without obstruction   GI on board.  Per EMS, General surgery evaluation warranted  Generally surgery consult - tentative plan for cholecystectomy which likely has to be open surgery on Tuesday 2/14  IV PPI daily added  Trend CMP  Symptomatic management    Noted to have low-grade fevers initially but afebrile overnight.  Blood cultures showed no growth till date.  Will hold off on antibiotics at this time        Hypoglycemia  Hypoglycemic likely in setting of NPO status with liver dysfunction. Not received any insulin    Improved on diet      Paroxysmal ventricular tachycardia  Continue amiodarone   Telemetry  Ensure electrolytes are optimized      Prostate cancer  S/p TURP  Appears to be on leuprolide injections per MAR    History of gout  Chronic, stable.  Continue allopurinol      Abnormal LFTs (liver function tests)  As above      CAD (coronary artery disease)  No chest pain.  Troponin negative   Continue aspirin, beta-blocker.  Holding statin due to elevated LFT      CKD (chronic kidney disease) stage 3, GFR 30-59 ml/min  Patient follows with Nephrology at Dr. Bahena [place].  Per patient he gets normal saline IV twice a week to avoid dehydration secondary to losses from his ostomy.    Renal function currently appears to be at baseline    P.r.n. IV fluids  Monitor renal function closely.  Avoid nephrotoxic drugs.  Renal dose medications  Consider Nephrology consult in case of worsening renal function      History of ulcerative colitis  S/p colectomy/ileostomy in the distant past      GERD (gastroesophageal reflux disease)        Essential hypertension  Blood pressure consistently elevated to systolic 170s.  At times patient has abdominal discomfort while blood pressure remains elevated even if the patient is not in any pain.  Unclear if the patient needs additional antihypertensives but given headache with persistently elevated blood pressures, amlodipine 5 mg q.d. added     Holding Coreg due to bradycardia.  Elevated bile salts can contribute to bradycardia in this clinical setting.    PRN hydralazine for SBP>160      Type 2 diabetes mellitus with stage 3 chronic kidney disease, without long-term current use of insulin  Patient's FSGs are controlled on current  medication regimen.  Last A1c reviewed-   Lab Results   Component Value Date    HGBA1C 7.0 (H) 12/06/2022     Most recent fingerstick glucose reviewed-   Recent Labs   Lab 02/11/23  0546 02/11/23  0650 02/11/23  1209 02/11/23  1651   POCTGLUCOSE 65* 84 125* 137*     Current correctional scale  none    Antihyperglycemics (From admission, onward)    Start     Stop Route Frequency Ordered    02/09/23 2158  insulin aspart U-100 pen 0-5 Units         -- SubQ Before meals & nightly PRN 02/09/23 2059        Hold Oral hypoglycemics while patient is in the hospital.      VTE Risk Mitigation (From admission, onward)         Ordered     enoxaparin injection 30 mg  Daily         02/09/23 1138     IP VTE HIGH RISK PATIENT  Once         02/09/23 1138     Place sequential compression device  Until discontinued         02/09/23 1138                Discharge Planning   JOSÉ MIGUEL: 2/11/2023     Code Status: Full Code   Is the patient medically ready for discharge?:     Reason for patient still in hospital (select all that apply): Patient trending condition and Consult recommendations  Discharge Plan A: Home with family        Updated patient's daughter Latisha Charlton via telephone on 02/11      Georgia Parikh MD  Department of Hospital Medicine   [place] - [facility]

## 2023-02-12 NOTE — PROGRESS NOTES
[facility]  [facility]ology  Progress Note    Patient Name: Jarrett Charlton Jr.  MRN: [id number]  Admission Date: 2/9/2023  Hospital Length of Stay: 1 days  Code Status: Full Code   Attending Provider: Georgia Parikh MD  Consulting Provider: Jamar Wu MD  Primary Care Physician: Poli Gonzalez MD  Principal Problem: Cholestatic jaundice      Subjective:     Interval History:   No events  Still some bloat  Seen by surgeon  No vomit , no radiating symptosm. Upper abd distress with some assoc distention    Review of Systems   Constitutional:  Positive for appetite change and fatigue. Negative for chills and fever.   Respiratory:  Negative for choking and chest tightness.    Gastrointestinal:  Positive for abdominal distention and nausea. Negative for abdominal pain and vomiting.   Neurological:  Negative for dizziness and headaches.   Psychiatric/Behavioral:  Negative for agitation and behavioral problems.    Objective:     Vital Signs (Most Recent):  Temp: 96.1 °F (35.6 °C) (02/12/23 1214)  Pulse: (!) 49 (02/12/23 1214)  Resp: 16 (02/12/23 1214)  BP: 120/64 (02/12/23 1214)  SpO2: 95 % (02/12/23 1214)   Vital Signs (24h Range):  Temp:  [96.1 °F (35.6 °C)-99.9 °F (37.7 °C)] 96.1 °F (35.6 °C)  Pulse:  [47-72] 49  Resp:  [15-20] 16  SpO2:  [94 %-98 %] 95 %  BP: (120-183)/(64-88) 120/64     Weight: 94.3 kg (207 lb 14.3 oz) (02/10/23 0700)  Body mass index is 26.69 kg/m².      Intake/Output Summary (Last 24 hours) at 2/12/2023 1520  Last data filed at 2/12/2023 1245  Gross per 24 hour   Intake 625 ml   Output 500 ml   Net 125 ml       Lines/Drains/Airways       Drain  Duration                  Ileostomy 01/07/19 1101 RLQ 1497 days              Peripheral Intravenous Line  Duration                  Peripheral IV - Single Lumen 02/12/23 1104 20 G Right;Medial Wrist <1 day                    Physical Exam  Vitals reviewed.   Constitutional:       Appearance: He is not toxic-appearing.   HENT:      Head:  Normocephalic and atraumatic.   Eyes:      General: No scleral icterus.     Conjunctiva/sclera: Conjunctivae normal.   Abdominal:      Palpations: Abdomen is soft.      Comments: Ileostomy rlq ; soft abd , nonrigid , no acute abd   Skin:     General: Skin is dry.      Coloration: Skin is not pale.   Neurological:      Mental Status: He is alert and oriented to person, place, and time. Mental status is at baseline.       Significant Labs:  CBC:   Recent Labs   Lab 02/12/23 0648   WBC 3.66*   HGB 11.6*   HCT 36.4*        BMP:   Recent Labs   Lab 02/12/23 0648   *      K 4.2      CO2 24   BUN 26*   CREATININE 2.5*   CALCIUM 9.0     CMP:   Recent Labs   Lab 02/12/23 0648   *   CALCIUM 9.0   ALBUMIN 2.7*   PROT 6.4      K 4.2   CO2 24      BUN 26*   CREATININE 2.5*   ALKPHOS 379*   *   AST 49*   BILITOT 2.7*         Significant Imaging:  Imaging results within the past 24 hours have been reviewed.    Assessment/Plan:     * Cholestatic jaundice  LFTs downttrend  EUS without obstruction but with seemingly prolapse of the papilla  Discussed with aes    Recs:  Appreciate gen surgery eval  Proceed with chato    Call us back if needed.            Thank you for your consult. I will sign off. Please contact us if you have any additional questions.    Jamar Wu MD  Gastroenterology  [place] [facility]

## 2023-02-12 NOTE — CONSULTS
[facility]  INPATIENT Consult    REASON FOR CONSULT/ADMISSION: Biliary colic    HPI: Jarrett Charlton Jr. is a 72 y.o. male admitted with RUQ/epigastric pain and elevated LFTs.  U/S showed dilated CBD without obvious obstruction.  MRI revealed similar CBD dilation as well as a contracted gallbladder with wall thickening and fluid.  EUS did not reveal any CBD obstruction although the lower 1/3 was obscured by a duodenal diverticulum.  The papilla was within the diverticulum and an ERCP was not attempted. LFTs have began to normalize although the patient continues to have RUQ pain and decreased po intake.  WBC is normal.  Pt denies F/C.  He has had a total colectomy with end ileostomy as well as subsequent laparotomy for parastomal hernia repair with Phasix mesh in November 2020.    I have reviewed the patient's chart including prior progress notes, procedures and testing.     ROS:   Review of Systems   All other systems reviewed and are negative.    PROBLEM LIST:  Patient Active Problem List   Diagnosis    Type 2 diabetes mellitus with stage 3 chronic kidney disease, without long-term current use of insulin    Essential hypertension    Chronic renal impairment    Idiopathic chronic gout of multiple sites with tophus    HLD (hyperlipidemia)    BPH (benign prostatic hyperplasia)    Disc disease, degenerative, cervical    GERD (gastroesophageal reflux disease)    History of ulcerative colitis    CKD (chronic kidney disease) stage 3, GFR 30-59 ml/min    Proteinuria    DDD (degenerative disc disease)    Generalized osteoarthritis    Elevated PSA    S/P TURP    CAD (coronary artery disease)    Presence of arterial stent    GRADY (acute kidney injury)    Syncope due to orthostatic hypotension    Abnormal LFTs (liver function tests)    Chest pain    SBO (small bowel obstruction)    High output ileostomy    History of gout    History of elevated PSA    History of BPH    Bowel obstruction    Parastomal hernia with  obstruction and without gangrene    Incisional hernia, without obstruction or gangrene    Hyperkalemia    Metabolic acidosis with normal anion gap and bicarbonate losses    Pleural plaque    Aortic atherosclerosis    Type 2 diabetes mellitus with diabetic polyneuropathy, without long-term current use of insulin    Ileostomy in place    Status post repair of ventral hernia    Parotid mass    Prostate cancer    Polyneuropathy associated with underlying disease    Stage 4 chronic kidney disease    VT (ventricular tachycardia)    Paroxysmal ventricular tachycardia    Colostomy present    Hypokalemia    Dehydration    Abnormal EKG    Aortic aneurysm    Bifascicular block    Prolonged QT interval    Cholestatic jaundice    Hypoglycemia         HISTORY  Past Medical History:   Diagnosis Date    Basal cell carcinoma     BPH (benign prostatic hyperplasia)     s/p TURP    Carotid stenosis     Chronic kidney disease     Claudication     Coronary artery disease     DDD (degenerative disc disease) 10/21/2013    Diabetes mellitus with renal complications     Disc disease, degenerative, cervical     Encounter for blood transfusion     GERD (gastroesophageal reflux disease)     Gout, chronic     History of ulcerative colitis     s/p colectomy and ileostomy    HLD (hyperlipidemia)     HTN (hypertension)     Ileostomy in place 1982    RBBB     Squamous cell carcinoma 03/08/2018    Left superior helix near insertion    Squamous cell carcinoma 04/12/2018    Left forearm x 5    Ventricular tachycardia        Past Surgical History:   Procedure Laterality Date    cardiac stents      CATARACT EXTRACTION Bilateral     CERVICAL FUSION      colectomy and ileostomy  1985    ENDOSCOPIC ULTRASOUND OF UPPER GASTROINTESTINAL TRACT N/A 2/10/2023    Procedure: ULTRASOUND, UPPER GI TRACT, ENDOSCOPIC;  Surgeon: Poli Fuller MD;  Location: [facility];  Service: Endoscopy;  Laterality: N/A;    ERCP N/A 2/10/2023    Procedure: ERCP (ENDOSCOPIC  RETROGRADE CHOLANGIOPANCREATOGRAPHY);  Surgeon: Poli Fuller MD;  Location: [facility] ENDO;  Service: Endoscopy;  Laterality: N/A;    EXCISION OF PAROTID GLAND Left 12/18/2020    Procedure: EXCISION, PAROTID GLAND;  Surgeon: Michael Pinzon MD;  Location: [facility] OR 2ND FLR;  Service: ENT;  Laterality: Left;    INSERTION OF IMPLANTABLE LOOP RECORDER  06/07/2021    INSERTION OF IMPLANTABLE LOOP RECORDER Left 6/7/2021    Procedure: INSERTION, IMPLANTABLE LOOP RECORDER;  Surgeon: Romario Dao MD;  Location: [facility] CATH LAB;  Service: Cardiology;  Laterality: Left;    LEFT HEART CATHETERIZATION Right 4/15/2021    Procedure: CATHETERIZATION, HEART, LEFT;  Surgeon: Marcio Jones MD;  Location: [facility] CATH LAB;  Service: Cardiology;  Laterality: Right;    LYSIS OF ADHESIONS N/A 11/9/2020    Procedure: LYSIS, ADHESIONS,  ERAS low;  Surgeon: CED Haley MD;  [facility];  Service: Colon and Rectal;  Laterality: N/A;    POUCHOSCOPY N/A 4/6/2022    Procedure: ENDOSCOPY, POUCH, SMALL INTESTINE, DIAGNOSTIC;  Surgeon: Dieter Juarez MD;  Location: [facility] (2ND FLR);  Service: Endoscopy;  Laterality: N/A;    REPAIR, HERNIA, PARASTOMAL N/A 11/9/2020    Procedure: REPAIR, HERNIA, PARASTOMAL;  Surgeon: CED Haley MD;  [facility];  Service: Colon and Rectal;  Laterality: N/A;    TRANSURETHRAL RESECTION OF PROSTATE (TURP) WITHOUT USE OF LASER N/A 1/23/2019    Procedure: TURP, WITHOUT USING LASER BIPOLAR;  Surgeon: Catarino Mota MD;  Location: [facility] OR 1ST FLR;  Service: Urology;  Laterality: N/A;  1.5 HOURS       Social History     Tobacco Use    Smoking status: Never    Smokeless tobacco: Never   Substance Use Topics    Alcohol use: No    Drug use: No       Family History   Problem Relation Age of Onset    Cancer Father     Diabetes Father     Dementia Mother     Melanoma Neg Hx     Hypertension Neg Hx     Arthritis Neg Hx          MEDS:  Current Facility-Administered Medications  on File Prior to Encounter   Medication Dose Route Frequency Provider Last Rate Last Admin    sodium chloride 0.9% in 1,000 mL infusion   Intravenous Continuous Paper Order         Current Outpatient Medications on File Prior to Encounter   Medication Sig Dispense Refill    allopurinoL (ZYLOPRIM) 300 MG tablet Take 1.5 tablets (450 mg total) by mouth once daily. 135 tablet 3    amiodarone (PACERONE) 200 MG Tab Take 1 tablet (200 mg total) by mouth 2 (two) times daily.      aspirin (ECOTRIN) 81 MG EC tablet Take 81 mg by mouth once daily.      brimonidine 0.2% (ALPHAGAN) 0.2 % Drop INSTILL 1 DROP INTO EACH EYE TWICE DAILY 20 mL 0    calcium citrate-vitamin D3 (CITRACAL + D MAXIMUM) 315 mg-6.25 mcg (250 unit) Tab Take 1 tablet by mouth once daily. 120 tablet 3    carvediloL (COREG) 6.25 MG tablet Take 1 tablet (6.25 mg total) by mouth 2 (two) times daily with meals. 180 tablet 0    droxidopa 100 mg Cap Take 2 capsules by mouth once daily. 400mg daily      FIBER, CALCIUM POLYCARBOPHIL, 625 mg tablet Take 3 tablets by mouth before dinner.      glimepiride (AMARYL) 4 MG tablet Take 4 mg by mouth 2 (two) times daily before meals.      pantoprazole (PROTONIX) 40 MG tablet Take 40 mg by mouth once daily.      pravastatin (PRAVACHOL) 80 MG tablet Take 1 tablet (80 mg total) by mouth once daily. 90 tablet 0    ACCU-CHEK PAUL CONTROL SOLN Soln 1 drop by NOT APPLICABLE route once daily.      ACCU-CHEK SOFTCLIX LANCETS Misc Accucheck tid 200 each 1    ALPRAZolam (XANAX) 0.5 MG tablet Take 0.5 mg by mouth nightly as needed for Anxiety.      BD ALCOHOL SWABS PadM       blood sugar diagnostic (ONETOUCH ULTRA TEST) Strp USE ONE STRIP TO CHECK GLUCOSE EVERY  each 11    blood-glucose meter (ACCU-CHEK GUIDE GLUCOSE METER) [facility] Accucheck BID 1 each 0    droxidopa 300 mg Cap Take 600 mg by mouth. 2 tablets three times a day         ALLERGIES:  Review of patient's allergies indicates:   Allergen Reactions    Crestor [rosuvastatin]      Lipitor [atorvastatin]          VITALS:  Temp:  [97.5 °F (36.4 °C)-100.1 °F (37.8 °C)] 99 °F (37.2 °C)  Pulse:  [51-72] 53  Resp:  [15-20] 16  SpO2:  [94 %-98 %] 98 %  BP: (158-183)/(74-88) 183/88    I/O last 3 completed shifts:  In: 475 [P.O.:475]  Out: -       PHYSICAL EXAM:  Physical Exam  Constitutional:       Appearance: Normal appearance.   HENT:      Head: Normocephalic and atraumatic.   Pulmonary:      Effort: Pulmonary effort is normal.   Abdominal:      Palpations: Abdomen is soft.      Tenderness: There is abdominal tenderness. There is no guarding or rebound.   Skin:     General: Skin is warm and dry.   Neurological:      Mental Status: Mental status is at baseline.   Psychiatric:         Mood and Affect: Mood normal.         Behavior: Behavior normal.         LABS:  Lab Results   Component Value Date    WBC 3.66 (L) 02/12/2023    RBC 4.13 (L) 02/12/2023    HGB 11.6 (L) 02/12/2023    HCT 36.4 (L) 02/12/2023     02/12/2023     Lab Results   Component Value Date     (H) 02/12/2023     02/12/2023    K 4.2 02/12/2023     02/12/2023    CO2 24 02/12/2023    BUN 26 (H) 02/12/2023    CREATININE 2.5 (H) 02/12/2023    CALCIUM 9.0 02/12/2023     Lab Results   Component Value Date     (H) 02/12/2023    AST 49 (H) 02/12/2023    ALKPHOS 379 (H) 02/12/2023    BILITOT 2.7 (H) 02/12/2023     Lab Results   Component Value Date    MG 1.5 (L) 08/01/2022    PHOS 4.2 01/30/2023       STUDIES:  CT, MRI, U/S images and reports were personally reviewed.        ASSESSMENT & PLAN:  72 y.o. male with resolving elevated LFTs and biliary colic.  Will need cholecystectomy, will almost certainly have to be open do to prior surgeries.  Tentatively plan for Tuesday.      Mike Joyec M.D., F.A.C.S.  [facility]  [facility] [place] [place]

## 2023-02-12 NOTE — ASSESSMENT & PLAN NOTE
LFTs downttrend  EUS without obstruction but with seemingly prolapse of the papilla  Discussed with aes    Recs:  Appreciate gen surgery eval  Proceed with chato    Call us back if needed.

## 2023-02-12 NOTE — SUBJECTIVE & OBJECTIVE
Interval History:  No acute events overnight.  Intermittently has abdominal discomfort as afraid to eat.    Review of Systems   Respiratory:  Negative for shortness of breath.    Gastrointestinal:  Positive for abdominal pain.   Objective:     Vital Signs (Most Recent):  Temp: 96.1 °F (35.6 °C) (02/12/23 1214)  Pulse: (!) 49 (02/12/23 1214)  Resp: 16 (02/12/23 1214)  BP: 120/64 (02/12/23 1214)  SpO2: 95 % (02/12/23 1214)   Vital Signs (24h Range):  Temp:  [96.1 °F (35.6 °C)-99.9 °F (37.7 °C)] 96.1 °F (35.6 °C)  Pulse:  [47-72] 49  Resp:  [15-20] 16  SpO2:  [94 %-98 %] 95 %  BP: (120-183)/(64-88) 120/64     Weight: 94.3 kg (207 lb 14.3 oz)  Body mass index is 26.69 kg/m².    Intake/Output Summary (Last 24 hours) at 2/12/2023 1457  Last data filed at 2/12/2023 1245  Gross per 24 hour   Intake 625 ml   Output 500 ml   Net 125 ml        Physical Exam  Vitals and nursing note reviewed.   Constitutional:       General: He is not in acute distress.     Appearance: He is well-developed. He is not diaphoretic.      Comments: Elderly male   HENT:      Mouth/Throat:      Mouth: Mucous membranes are moist.   Eyes:      General: Scleral icterus present.   Neck:      Trachea: No tracheal deviation.   Cardiovascular:      Rate and Rhythm: Regular rhythm. Bradycardia present.      Heart sounds: Normal heart sounds. No murmur heard.  Pulmonary:      Effort: Pulmonary effort is normal. No respiratory distress.      Breath sounds: Normal breath sounds. No wheezing.   Abdominal:      General: Bowel sounds are normal. There is no distension.      Palpations: Abdomen is soft.      Tenderness: There is abdominal tenderness.   Musculoskeletal:      Cervical back: Neck supple.      Right lower leg: No edema.      Left lower leg: No edema.   Skin:     General: Skin is warm and dry.      Findings: No rash.   Neurological:      Mental Status: He is alert and oriented to person, place, and time.   Psychiatric:         Behavior: Behavior normal.        Significant Labs: All pertinent labs within the past 24 hours have been reviewed.    Significant Imaging: I have reviewed all pertinent imaging results/findings within the past 24 hours.

## 2023-02-13 ENCOUNTER — ANESTHESIA EVENT (OUTPATIENT)
Dept: SURGERY | Facility: HOSPITAL | Age: 73
DRG: 415 | End: 2023-02-13
Payer: MEDICARE

## 2023-02-13 DIAGNOSIS — R35.89 POLYURIA: Primary | ICD-10-CM

## 2023-02-13 PROBLEM — R51.9 HEADACHE: Status: ACTIVE | Noted: 2023-02-13

## 2023-02-13 LAB
ALBUMIN SERPL BCP-MCNC: 2.4 G/DL (ref 3.5–5.2)
ALP SERPL-CCNC: 356 U/L (ref 55–135)
ALT SERPL W/O P-5'-P-CCNC: 85 U/L (ref 10–44)
ANION GAP SERPL CALC-SCNC: 12 MMOL/L (ref 8–16)
AST SERPL-CCNC: 43 U/L (ref 10–40)
BASOPHILS # BLD AUTO: 0.01 K/UL (ref 0–0.2)
BASOPHILS NFR BLD: 0.3 % (ref 0–1.9)
BILIRUB SERPL-MCNC: 1.9 MG/DL (ref 0.1–1)
BUN SERPL-MCNC: 27 MG/DL (ref 8–23)
CALCIUM SERPL-MCNC: 7.6 MG/DL (ref 8.7–10.5)
CHLORIDE SERPL-SCNC: 111 MMOL/L (ref 95–110)
CO2 SERPL-SCNC: 19 MMOL/L (ref 23–29)
CREAT SERPL-MCNC: 2.3 MG/DL (ref 0.5–1.4)
DIFFERENTIAL METHOD: ABNORMAL
EOSINOPHIL # BLD AUTO: 0.1 K/UL (ref 0–0.5)
EOSINOPHIL NFR BLD: 3.2 % (ref 0–8)
ERYTHROCYTE [DISTWIDTH] IN BLOOD BY AUTOMATED COUNT: 14.8 % (ref 11.5–14.5)
ERYTHROCYTE [SEDIMENTATION RATE] IN BLOOD BY PHOTOMETRIC METHOD: 71 MM/HR (ref 0–23)
EST. GFR  (NO RACE VARIABLE): 29 ML/MIN/1.73 M^2
GLUCOSE SERPL-MCNC: 116 MG/DL (ref 70–110)
HCT VFR BLD AUTO: 31.6 % (ref 40–54)
HGB BLD-MCNC: 10.2 G/DL (ref 14–18)
IMM GRANULOCYTES # BLD AUTO: 0.02 K/UL (ref 0–0.04)
IMM GRANULOCYTES NFR BLD AUTO: 0.6 % (ref 0–0.5)
LYMPHOCYTES # BLD AUTO: 1 K/UL (ref 1–4.8)
LYMPHOCYTES NFR BLD: 28.7 % (ref 18–48)
MCH RBC QN AUTO: 28.7 PG (ref 27–31)
MCHC RBC AUTO-ENTMCNC: 32.3 G/DL (ref 32–36)
MCV RBC AUTO: 89 FL (ref 82–98)
MONOCYTES # BLD AUTO: 0.4 K/UL (ref 0.3–1)
MONOCYTES NFR BLD: 10.1 % (ref 4–15)
NEUTROPHILS # BLD AUTO: 2 K/UL (ref 1.8–7.7)
NEUTROPHILS NFR BLD: 57.1 % (ref 38–73)
NRBC BLD-RTO: 0 /100 WBC
PLATELET # BLD AUTO: 139 K/UL (ref 150–450)
PMV BLD AUTO: 11.5 FL (ref 9.2–12.9)
POCT GLUCOSE: 103 MG/DL (ref 70–110)
POCT GLUCOSE: 117 MG/DL (ref 70–110)
POCT GLUCOSE: 136 MG/DL (ref 70–110)
POCT GLUCOSE: 90 MG/DL (ref 70–110)
POTASSIUM SERPL-SCNC: 3.3 MMOL/L (ref 3.5–5.1)
PROT SERPL-MCNC: 5.5 G/DL (ref 6–8.4)
RBC # BLD AUTO: 3.56 M/UL (ref 4.6–6.2)
SODIUM SERPL-SCNC: 142 MMOL/L (ref 136–145)
WBC # BLD AUTO: 3.45 K/UL (ref 3.9–12.7)

## 2023-02-13 PROCEDURE — 99231 PR SUBSEQUENT HOSPITAL CARE,LEVL I: ICD-10-PCS | Mod: 57,HCNC,, | Performed by: SURGERY

## 2023-02-13 PROCEDURE — G0378 HOSPITAL OBSERVATION PER HR: HCPCS | Mod: HCNC

## 2023-02-13 PROCEDURE — 36415 COLL VENOUS BLD VENIPUNCTURE: CPT | Mod: HCNC | Performed by: STUDENT IN AN ORGANIZED HEALTH CARE EDUCATION/TRAINING PROGRAM

## 2023-02-13 PROCEDURE — C9113 INJ PANTOPRAZOLE SODIUM, VIA: HCPCS | Mod: HCNC | Performed by: STUDENT IN AN ORGANIZED HEALTH CARE EDUCATION/TRAINING PROGRAM

## 2023-02-13 PROCEDURE — 96361 HYDRATE IV INFUSION ADD-ON: CPT

## 2023-02-13 PROCEDURE — 11000001 HC ACUTE MED/SURG PRIVATE ROOM: Mod: HCNC

## 2023-02-13 PROCEDURE — 96372 THER/PROPH/DIAG INJ SC/IM: CPT | Performed by: STUDENT IN AN ORGANIZED HEALTH CARE EDUCATION/TRAINING PROGRAM

## 2023-02-13 PROCEDURE — 99231 SBSQ HOSP IP/OBS SF/LOW 25: CPT | Mod: 57,HCNC,, | Performed by: SURGERY

## 2023-02-13 PROCEDURE — 85652 RBC SED RATE AUTOMATED: CPT | Mod: HCNC | Performed by: STUDENT IN AN ORGANIZED HEALTH CARE EDUCATION/TRAINING PROGRAM

## 2023-02-13 PROCEDURE — 25000003 PHARM REV CODE 250: Mod: HCNC | Performed by: STUDENT IN AN ORGANIZED HEALTH CARE EDUCATION/TRAINING PROGRAM

## 2023-02-13 PROCEDURE — 80053 COMPREHEN METABOLIC PANEL: CPT | Mod: HCNC | Performed by: STUDENT IN AN ORGANIZED HEALTH CARE EDUCATION/TRAINING PROGRAM

## 2023-02-13 PROCEDURE — 85025 COMPLETE CBC W/AUTO DIFF WBC: CPT | Mod: HCNC | Performed by: STUDENT IN AN ORGANIZED HEALTH CARE EDUCATION/TRAINING PROGRAM

## 2023-02-13 PROCEDURE — 63600175 PHARM REV CODE 636 W HCPCS: Mod: HCNC | Performed by: STUDENT IN AN ORGANIZED HEALTH CARE EDUCATION/TRAINING PROGRAM

## 2023-02-13 RX ADMIN — ENOXAPARIN SODIUM 30 MG: 40 INJECTION SUBCUTANEOUS at 04:02

## 2023-02-13 RX ADMIN — BRIMONIDINE TARTRATE 1 DROP: 2 SOLUTION OPHTHALMIC at 08:02

## 2023-02-13 RX ADMIN — SODIUM CHLORIDE: 0.9 INJECTION, SOLUTION INTRAVENOUS at 08:02

## 2023-02-13 RX ADMIN — ASPIRIN 81 MG: 81 TABLET, COATED ORAL at 08:02

## 2023-02-13 RX ADMIN — PANTOPRAZOLE SODIUM 40 MG: 40 INJECTION, POWDER, LYOPHILIZED, FOR SOLUTION INTRAVENOUS at 08:02

## 2023-02-13 RX ADMIN — AMIODARONE HYDROCHLORIDE 200 MG: 200 TABLET ORAL at 08:02

## 2023-02-13 RX ADMIN — HYDROXYZINE HYDROCHLORIDE 50 MG: 25 TABLET, FILM COATED ORAL at 10:02

## 2023-02-13 RX ADMIN — AMLODIPINE BESYLATE 5 MG: 5 TABLET ORAL at 08:02

## 2023-02-13 RX ADMIN — Medication 6 MG: at 10:02

## 2023-02-13 RX ADMIN — ALLOPURINOL 300 MG: 100 TABLET ORAL at 08:02

## 2023-02-13 RX ADMIN — SIMETHICONE 80 MG: 80 TABLET, CHEWABLE ORAL at 10:02

## 2023-02-13 RX ADMIN — HYPROMELLOSE 2910 1 DROP: 5 SOLUTION/ DROPS OPHTHALMIC at 08:02

## 2023-02-13 RX ADMIN — HYDRALAZINE HYDROCHLORIDE 5 MG: 20 INJECTION, SOLUTION INTRAMUSCULAR; INTRAVENOUS at 08:02

## 2023-02-13 RX ADMIN — SIMETHICONE 80 MG: 80 TABLET, CHEWABLE ORAL at 08:02

## 2023-02-13 NOTE — PLAN OF CARE
"  Problem: Adult Inpatient Plan of Care  Goal: Plan of Care Review  Outcome: Ongoing, Progressing     Problem: Adult Inpatient Plan of Care  Goal: Optimal Comfort and Wellbeing  Outcome: Ongoing, Progressing     Problem: Diabetes Comorbidity  Goal: Blood Glucose Level Within Targeted Range  Outcome: Ongoing, Progressing     Problem: Hypertension Comorbidity  Goal: Blood Pressure in Desired Range  Outcome: Ongoing, Progressing     Problem: Glycemic Control Impaired (Surgery Nonspecified)  Goal: Blood Glucose Level Within Targeted Range  Outcome: Ongoing, Progressing     .Plan of care reviewed with the patient. Scheduled medicines given and the patient tolerated well. On continuous IV infusion 0.9% NS @ 75 ml/hr. Fall and safety precautions taken and the standard interventions are in place. On Telemetry monitoring with NSR, no true "red alarms' noted, no acute distress reported either in the shift. Patient's Accu Check was 151 mg/dl. Advised the patient to call for the assistance. Continued monitoring the patient.   "

## 2023-02-13 NOTE — SUBJECTIVE & OBJECTIVE
Interval History:  Headache much improved.  He did not eat much because he was afraid of bloating.    Review of Systems   Respiratory:  Negative for shortness of breath.    Gastrointestinal:  Negative for abdominal pain.   Neurological:  Negative for headaches.   Objective:     Vital Signs (Most Recent):  Temp: 97.6 °F (36.4 °C) (02/13/23 1215)  Pulse: 65 (02/13/23 1215)  Resp: 16 (02/13/23 1215)  BP: (!) 164/87 (02/13/23 1215)  SpO2: 97 % (02/13/23 1215)   Vital Signs (24h Range):  Temp:  [96.6 °F (35.9 °C)-98.4 °F (36.9 °C)] 97.6 °F (36.4 °C)  Pulse:  [49-65] 65  Resp:  [16-20] 16  SpO2:  [95 %-100 %] 97 %  BP: (135-188)/(69-98) 164/87     Weight: 94.3 kg (207 lb 14.3 oz)  Body mass index is 26.69 kg/m².    Intake/Output Summary (Last 24 hours) at 2/13/2023 1433  Last data filed at 2/13/2023 0435  Gross per 24 hour   Intake 750 ml   Output --   Net 750 ml        Physical Exam  Vitals and nursing note reviewed.   Constitutional:       General: He is not in acute distress.     Appearance: He is well-developed. He is not diaphoretic.      Comments: Elderly male   HENT:      Mouth/Throat:      Mouth: Mucous membranes are moist.   Neck:      Trachea: No tracheal deviation.   Cardiovascular:      Rate and Rhythm: Normal rate and regular rhythm.      Heart sounds: Normal heart sounds. No murmur heard.  Pulmonary:      Effort: Pulmonary effort is normal. No respiratory distress.      Breath sounds: Normal breath sounds. No wheezing.   Abdominal:      General: Bowel sounds are normal. There is no distension.      Palpations: Abdomen is soft.      Tenderness: There is abdominal tenderness.   Musculoskeletal:      Cervical back: Neck supple.      Right lower leg: No edema.      Left lower leg: No edema.   Skin:     General: Skin is warm and dry.      Findings: No rash.   Neurological:      Mental Status: He is alert and oriented to person, place, and time.   Psychiatric:         Behavior: Behavior normal.       Significant  Labs: All pertinent labs within the past 24 hours have been reviewed.    Significant Imaging: I have reviewed all pertinent imaging results/findings within the past 24 hours.

## 2023-02-13 NOTE — SUBJECTIVE & OBJECTIVE
Interval History: NAEO. Liver studies continue to improve. No pain this am. AF. HDS.     Medications:  Continuous Infusions:   sodium chloride 0.9% 75 mL/hr at 02/12/23 1817     Scheduled Meds:   allopurinoL  300 mg Oral Daily    amiodarone  200 mg Oral BID    amLODIPine  5 mg Oral Daily    artificial tears  1 drop Both Eyes BID    aspirin  81 mg Oral Daily    brimonidine 0.2%  1 drop Both Eyes BID    enoxaparin  30 mg Subcutaneous Daily    pantoprazole  40 mg Intravenous Daily    promethazine (PHENERGAN) IVPB  6.25 mg Intravenous Once    traMADoL  50 mg Oral Once     PRN Meds:acetaminophen, aluminum-magnesium hydroxide-simethicone, butalbital-acetaminophen-caffeine -40 mg, dextrose 10%, dextrose 10%, dextrose 10%, dextrose 10%, glucagon (human recombinant), glucagon (human recombinant), glucose, glucose, glucose, glucose, hydrALAZINE, hydrOXYzine HCL, insulin aspart U-100, meclizine, melatonin, naloxone, ondansetron, simethicone, sodium chloride 0.9%     Review of patient's allergies indicates:   Allergen Reactions    Crestor [rosuvastatin]     Lipitor [atorvastatin]      Objective:     Vital Signs (Most Recent):  Temp: 96.9 °F (36.1 °C) (02/13/23 0436)  Pulse: (!) 54 (02/13/23 0519)  Resp: 20 (02/13/23 0436)  BP: (!) 183/82 (02/13/23 0519)  SpO2: 97 % (02/13/23 0436)   Vital Signs (24h Range):  Temp:  [96.1 °F (35.6 °C)-99 °F (37.2 °C)] 96.9 °F (36.1 °C)  Pulse:  [47-56] 54  Resp:  [16-20] 20  SpO2:  [95 %-98 %] 97 %  BP: (120-188)/(64-88) 183/82     Weight: 94.3 kg (207 lb 14.3 oz)  Body mass index is 26.69 kg/m².    Intake/Output - Last 3 Shifts         02/11 0700  02/12 0659 02/12 0700 02/13 0659    P.O. 375 1125    Total Intake(mL/kg) 375 (4) 1125 (11.9)    Urine (mL/kg/hr)  500 (0.2)    Stool  0    Total Output  500    Net +375 +625          Urine Occurrence  5 x    Stool Occurrence  1 x            Physical Exam  Vitals reviewed.   Constitutional:       General: He is not in acute distress.  HENT:       Head: Normocephalic and atraumatic.      Nose: Nose normal.   Eyes:      General:         Right eye: No discharge.         Left eye: No discharge.   Cardiovascular:      Rate and Rhythm: Normal rate and regular rhythm.   Pulmonary:      Effort: Pulmonary effort is normal. No respiratory distress.      Breath sounds: No stridor.   Abdominal:      Comments: Soft, ND  Midline laparotomy  Ileostomy in place    Musculoskeletal:         General: Normal range of motion.      Cervical back: Normal range of motion.   Skin:     General: Skin is warm and dry.      Capillary Refill: Capillary refill takes 2 to 3 seconds.   Neurological:      General: No focal deficit present.      Mental Status: He is alert and oriented to person, place, and time.   Psychiatric:         Mood and Affect: Mood normal.         Behavior: Behavior normal.       Significant Labs:  I have reviewed all pertinent lab results within the past 24 hours.    Significant Diagnostics:  I have reviewed all pertinent imaging results/findings within the past 24 hours.

## 2023-02-13 NOTE — PROGRESS NOTES
[facility]  General Surgery  Progress Note    Subjective:     History of Present Illness:  No notes on file    Post-Op Info:  Procedure(s) (LRB):  ULTRASOUND, UPPER GI TRACT, ENDOSCOPIC (N/A)  ERCP (ENDOSCOPIC RETROGRADE CHOLANGIOPANCREATOGRAPHY) (N/A)   3 Days Post-Op     Interval History: NAEO. Liver studies continue to improve. No pain this am. AF. HDS.     Medications:  Continuous Infusions:   sodium chloride 0.9% 75 mL/hr at 02/12/23 1817     Scheduled Meds:   allopurinoL  300 mg Oral Daily    amiodarone  200 mg Oral BID    amLODIPine  5 mg Oral Daily    artificial tears  1 drop Both Eyes BID    aspirin  81 mg Oral Daily    brimonidine 0.2%  1 drop Both Eyes BID    enoxaparin  30 mg Subcutaneous Daily    pantoprazole  40 mg Intravenous Daily    promethazine (PHENERGAN) IVPB  6.25 mg Intravenous Once    traMADoL  50 mg Oral Once     PRN Meds:acetaminophen, aluminum-magnesium hydroxide-simethicone, butalbital-acetaminophen-caffeine -40 mg, dextrose 10%, dextrose 10%, dextrose 10%, dextrose 10%, glucagon (human recombinant), glucagon (human recombinant), glucose, glucose, glucose, glucose, hydrALAZINE, hydrOXYzine HCL, insulin aspart U-100, meclizine, melatonin, naloxone, ondansetron, simethicone, sodium chloride 0.9%     Review of patient's allergies indicates:   Allergen Reactions    Crestor [rosuvastatin]     Lipitor [atorvastatin]      Objective:     Vital Signs (Most Recent):  Temp: 96.9 °F (36.1 °C) (02/13/23 0436)  Pulse: (!) 54 (02/13/23 0519)  Resp: 20 (02/13/23 0436)  BP: (!) 183/82 (02/13/23 0519)  SpO2: 97 % (02/13/23 0436)   Vital Signs (24h Range):  Temp:  [96.1 °F (35.6 °C)-99 °F (37.2 °C)] 96.9 °F (36.1 °C)  Pulse:  [47-56] 54  Resp:  [16-20] 20  SpO2:  [95 %-98 %] 97 %  BP: (120-188)/(64-88) 183/82     Weight: 94.3 kg (207 lb 14.3 oz)  Body mass index is 26.69 kg/m².    Intake/Output - Last 3 Shifts         02/11 0700 02/12 0659 02/12 0700 02/13 0659    P.O. 378 8522     Total Intake(mL/kg) 375 (4) 1125 (11.9)    Urine (mL/kg/hr)  500 (0.2)    Stool  0    Total Output  500    Net +375 +625          Urine Occurrence  5 x    Stool Occurrence  1 x            Physical Exam  Vitals reviewed.   Constitutional:       General: He is not in acute distress.  HENT:      Head: Normocephalic and atraumatic.      Nose: Nose normal.   Eyes:      General:         Right eye: No discharge.         Left eye: No discharge.   Cardiovascular:      Rate and Rhythm: Normal rate and regular rhythm.   Pulmonary:      Effort: Pulmonary effort is normal. No respiratory distress.      Breath sounds: No stridor.   Abdominal:      Comments: Soft, ND  Midline laparotomy  Ileostomy in place    Musculoskeletal:         General: Normal range of motion.      Cervical back: Normal range of motion.   Skin:     General: Skin is warm and dry.      Capillary Refill: Capillary refill takes 2 to 3 seconds.   Neurological:      General: No focal deficit present.      Mental Status: He is alert and oriented to person, place, and time.   Psychiatric:         Mood and Affect: Mood normal.         Behavior: Behavior normal.       Significant Labs:  I have reviewed all pertinent lab results within the past 24 hours.    Significant Diagnostics:  I have reviewed all pertinent imaging results/findings within the past 24 hours.    Assessment/Plan:     * Cholestatic jaundice  72M with resolving elevated LFTs and biliary colic.     -Still plan for lap possible open chato tomorrow  -NPO midnight   -Please call with questions         Jered Grider MD  General Surgery  [place] - Telemetry

## 2023-02-13 NOTE — ASSESSMENT & PLAN NOTE
Elevated LFTs, cholestatic pattern -improving overall  Dilated duct seen on right upper quadrant ultrasound/MRCP     S/p ERCP/EUS on 02/10 without obstruction   GI on board.  Per EMS, General surgery evaluation warranted    Generally surgery consult - plan for cholecystectomy which likely has to be open surgery on Tuesday 2/14.  NPO after midnight  IV PPI daily added  Trend CMP  Symptomatic management   Noted to have low-grade fevers initially but afebrile overnight.  Blood cultures showed no growth till date.  Will hold off on antibiotics at this time

## 2023-02-13 NOTE — PROGRESS NOTES
Progress Note  Neurology    Admit Date: 2/9/2023  LOS: 2    CC: 10/10 headache with R eye watering/blurry vision, atypical from normal headache    SUBJECTIVE:     Interval History/Significant Events: patient reports that his headache is resolved today. He states his vision is back to baseline and no longer blurry. ESR is 71 but in the setting of cholestatic jaundice.    Review of Symptoms:   Negative unless stated otherwise in interval Hx.    Medications:    Scheduled Meds:   allopurinoL  300 mg Oral Daily    amiodarone  200 mg Oral BID    amLODIPine  5 mg Oral Daily    artificial tears  1 drop Both Eyes BID    aspirin  81 mg Oral Daily    brimonidine 0.2%  1 drop Both Eyes BID    enoxaparin  30 mg Subcutaneous Daily    pantoprazole  40 mg Intravenous Daily    promethazine (PHENERGAN) IVPB  6.25 mg Intravenous Once    traMADoL  50 mg Oral Once     PRN Meds:.acetaminophen, aluminum-magnesium hydroxide-simethicone, butalbital-acetaminophen-caffeine -40 mg, dextrose 10%, dextrose 10%, dextrose 10%, dextrose 10%, glucagon (human recombinant), glucagon (human recombinant), glucose, glucose, glucose, glucose, hydrALAZINE, hydrOXYzine HCL, insulin aspart U-100, meclizine, melatonin, naloxone, ondansetron, simethicone, sodium chloride 0.9%    OBJECTIVE:     Physical Exam:    Vital Signs (24h Range):   Temp:  [96.6 °F (35.9 °C)-98.4 °F (36.9 °C)] 97.6 °F (36.4 °C)  Pulse:  [49-65] 61  Resp:  [16-20] 16  SpO2:  [95 %-100 %] 97 %  BP: (142-188)/(78-98) 164/87      Neuro:  Mental Status: Alert, oriented, thought content appropriate    Language: No aphasia    CN II-XII:  PERRL, V1-V3 symmetric, upper facial strength symmetric, lower facial strength symmetric, tongue midline, palate elevation symmetric     Motor:   --LUE strength: 5/5  --RUE strength: 5/5  --LLE strength: 5/5  --RLE strength: 5/5    Sensory: intact to light touch throughout     DTR: 2+ throughout bilaterally     Cerebellar: FNF and HS normal bilaterally      Gait deferred     Labs:  I have personally reviewed patient's labs for this admission.  ESR 71    Imaging:  I have personally reviewed the images below:   CTH: Stable exam as above.  No CT evidence of acute intracranial pathology.    ASSESSMENT/PLAN:     73 yo M presenting to OSH on 2/9/23 to ER with abdominal pain found to have cholestatic jaundice with plan for cholecystectomy. Neurology was consulted for severe 10/10 headache with R blurry vision and eye watering that is now resolved. ESR was elevated to 71 but in the setting of cholestatic disease and active inflammatory state. Patient lacks other symptoms of GCA including jaw claudication, temporal pain, and symptoms are now improved. He reports blurry vision resolved with eye drops (artificial tears). CTH was negative for acute intracranial abnormality. Suspect this to be a primary headache with migrainous features but given no prior hx of headaches and severity with associated visual symptoms, MRI B wo contrast to rule out more ominous causes.    Recommendations:   - MRI B wo contrast  - continue conservative treatment with alternating Tylenol/NSAIDs if no contraindications; prn IV or IM Toradol is an option     Discussed with staff, Dr. Ngo.      Waldemar Haynes MD   Neurology, PGY III

## 2023-02-13 NOTE — ASSESSMENT & PLAN NOTE
Patient follows with Nephrology at Dr. Josef Degroot.  Per patient he gets normal saline IV twice a week to avoid dehydration secondary to losses from his ostomy.    Renal function currently appears to be at baseline    Started on gentle hydration as anticipated to be NPO after MN  Monitor renal function closely.  Avoid nephrotoxic drugs.  Renal dose medications  Consider Nephrology consult in case of worsening renal function

## 2023-02-13 NOTE — ASSESSMENT & PLAN NOTE
72M with resolving elevated LFTs and biliary colic.     -Still plan for lap possible open chato tomorrow  -NPO midnight   -Please call with questions

## 2023-02-13 NOTE — PLAN OF CARE
0737  Message sent to the schedulers requesting that the appt with NICKY Arevalo today at 1130 be rescheduled due to the pt's continued hospitalization.     0852   CM was informed by  Caitlin of a message sent to NICKY Arevalo's  requesting a hospfu appt. Awaiting response.    1220  CM was informed by  Caitlin of a message sent to [facility] requesting a hospfu appt & rescheduled appt with NICKY Arevalo on 3/10/2023 at 0800. Information added to the pt's discharge paperwork.     Patient scheduled to have a lap chato done tomorrow.       Will continue to follow.

## 2023-02-13 NOTE — PROGRESS NOTES
[facility] Medicine  Progress Note    Patient Name: Jarrett Charlton Jr.  MRN: [id number]  Patient Class: IP- Inpatient   Admission Date: 2/9/2023  Length of Stay: 2 days  Attending Physician: Georgia Parikh MD  Primary Care Provider: Poli Gonzalez MD        Subjective:     Principal Problem:Cholestatic jaundice      HPI:  72-year-old male with PMH of diabetes mellitus, CKD, hypertension, hyperlipidemia, remote history of ulcerative colitis status post total colectomy with diverting ileostomy who presented to outside hospital ER with abdominal pain and found to have cholestatic pattern elevated LFTs with mild dilatation of common bile duct on abdominal ultrasound.  Follow-up MRCP showed mild nonspecific dilatation of CBD without filling defect or intrahepatic biliary ductal dilatation.  No cholelithiasis or evidence of acute cholecystitis.  Patient was transferred to [facility] for GI/surgery evaluation.    During my encounter, patient denies any abdominal pain but reports excessive generalized itching for which she had received Benadryl prior to transfer.  Nausea associated with abdominal pain has now resolved.  Denies any diarrhea, fever.  Denies any chest pain or shortness of breath.  No family at bedside.      Overview/Hospital Course:  No notes on file    Interval History:  Headache much improved.  He did not eat much because he was afraid of bloating.    Review of Systems   Respiratory:  Negative for shortness of breath.    Gastrointestinal:  Negative for abdominal pain.   Neurological:  Negative for headaches.   Objective:     Vital Signs (Most Recent):  Temp: 97.6 °F (36.4 °C) (02/13/23 1215)  Pulse: 65 (02/13/23 1215)  Resp: 16 (02/13/23 1215)  BP: (!) 164/87 (02/13/23 1215)  SpO2: 97 % (02/13/23 1215)   Vital Signs (24h Range):  Temp:  [96.6 °F (35.9 °C)-98.4 °F (36.9 °C)] 97.6 °F (36.4 °C)  Pulse:  [49-65] 65  Resp:  [16-20] 16  SpO2:  [95 %-100 %] 97 %  BP: (135-188)/(69-98) 164/87      Weight: 94.3 kg (207 lb 14.3 oz)  Body mass index is 26.69 kg/m².    Intake/Output Summary (Last 24 hours) at 2/13/2023 1433  Last data filed at 2/13/2023 0435  Gross per 24 hour   Intake 750 ml   Output --   Net 750 ml        Physical Exam  Vitals and nursing note reviewed.   Constitutional:       General: He is not in acute distress.     Appearance: He is well-developed. He is not diaphoretic.      Comments: Elderly male   HENT:      Mouth/Throat:      Mouth: Mucous membranes are moist.   Neck:      Trachea: No tracheal deviation.   Cardiovascular:      Rate and Rhythm: Normal rate and regular rhythm.      Heart sounds: Normal heart sounds. No murmur heard.  Pulmonary:      Effort: Pulmonary effort is normal. No respiratory distress.      Breath sounds: Normal breath sounds. No wheezing.   Abdominal:      General: Bowel sounds are normal. There is no distension.      Palpations: Abdomen is soft.      Tenderness: There is abdominal tenderness.   Musculoskeletal:      Cervical back: Neck supple.      Right lower leg: No edema.      Left lower leg: No edema.   Skin:     General: Skin is warm and dry.      Findings: No rash.   Neurological:      Mental Status: He is alert and oriented to person, place, and time.   Psychiatric:         Behavior: Behavior normal.       Significant Labs: All pertinent labs within the past 24 hours have been reviewed.    Significant Imaging: I have reviewed all pertinent imaging results/findings within the past 24 hours.      Assessment/Plan:      * Cholestatic jaundice  Elevated LFTs, cholestatic pattern -improving overall  Dilated duct seen on right upper quadrant ultrasound/MRCP     S/p ERCP/EUS on 02/10 without obstruction   GI on board.  Per EMS, General surgery evaluation warranted    Generally surgery consult - plan for cholecystectomy which likely has to be open surgery on Tuesday 2/14.  NPO after midnight  IV PPI daily added  Trend CMP  Symptomatic management   Noted to have  low-grade fevers initially but afebrile overnight.  Blood cultures showed no growth till date.  Will hold off on antibiotics at this time        Headache  Patient has reported persistent headaches since admission.  On 02/12 he reported unilateral blurring and lacrimation.  CT head ruled out bleed.      Patient denies any history of migraines but states he may be under stress and has been eating poorly which may be contributing to his headaches.    Neurology on board.  Appreciate recommendations.      Hypoglycemia  Hypoglycemic likely in setting of NPO status with liver dysfunction. Not received any insulin    Improved on diet      Paroxysmal ventricular tachycardia  Continue amiodarone   Telemetry  Ensure electrolytes are optimized      Prostate cancer  S/p TURP  Appears to be on leuprolide injections per MAR    History of gout  Chronic, stable.  Continue allopurinol      Abnormal LFTs (liver function tests)  As above      CAD (coronary artery disease)  No chest pain.  Troponin negative   Continue aspirin, beta-blocker.  Holding statin due to elevated LFT      CKD (chronic kidney disease) stage 3, GFR 30-59 ml/min  Patient follows with Nephrology at Dr. Bahena Covington.  Per patient he gets normal saline IV twice a week to avoid dehydration secondary to losses from his ostomy.    Renal function currently appears to be at baseline    Started on gentle hydration as anticipated to be NPO after MN  Monitor renal function closely.  Avoid nephrotoxic drugs.  Renal dose medications  Consider Nephrology consult in case of worsening renal function      History of ulcerative colitis  S/p colectomy/ileostomy in the distant past      GERD (gastroesophageal reflux disease)        Essential hypertension  Blood pressure consistently elevated to systolic 170s.  At times patient has abdominal discomfort while blood pressure remains elevated even if the patient is not in any pain.  Unclear if the patient needs additional  antihypertensives but given headache with persistently elevated blood pressures, amlodipine 5 mg q.d. added     Holding Coreg due to bradycardia.  Elevated bile salts can contribute to bradycardia in this clinical setting.    PRN hydralazine for SBP>160      Type 2 diabetes mellitus with stage 3 chronic kidney disease, without long-term current use of insulin  Patient's FSGs are controlled on current medication regimen.  Last A1c reviewed-   Lab Results   Component Value Date    HGBA1C 7.0 (H) 12/06/2022     Most recent fingerstick glucose reviewed-   Recent Labs   Lab 02/11/23  0546 02/11/23  0650 02/11/23  1209 02/11/23  1651   POCTGLUCOSE 65* 84 125* 137*     Current correctional scale  none    Antihyperglycemics (From admission, onward)    Start     Stop Route Frequency Ordered    02/09/23 2158  insulin aspart U-100 pen 0-5 Units         -- SubQ Before meals & nightly PRN 02/09/23 2059        Hold Oral hypoglycemics while patient is in the hospital.      VTE Risk Mitigation (From admission, onward)         Ordered     enoxaparin injection 30 mg  Daily         02/09/23 1138     IP VTE HIGH RISK PATIENT  Once         02/09/23 1138     Place sequential compression device  Until discontinued         02/09/23 1138                Discharge Planning   JOSÉ MIGUEL: 2/16/2023     Code Status: Full Code   Is the patient medically ready for discharge?:     Reason for patient still in hospital (select all that apply): Patient trending condition and Consult recommendations  Discharge Plan A: Home with family            Georgia Parikh MD  Department of Hospital Medicine   [facility]

## 2023-02-13 NOTE — PLAN OF CARE
Patient's BP was 188/87, on recheck it was 183/82. Offered PRN Hypertensive but patient refused to take it and says he will be fine with some sleep, reported to NP, Ms. Mireles.

## 2023-02-13 NOTE — ANESTHESIA PREPROCEDURE EVALUATION
02/13/2023  Jarrett Charlton Jr. is a 72 y.o., male.  To undergo Procedure(s) (LRB):  CHOLECYSTECTOMY, LAPAROSCOPIC (N/A)     Denies CP/SOB/GERD/MI/CVA/URI symptoms.  METS > 4  NPO > 8 instructions given    Past Medical History:  Past Medical History:   Diagnosis Date    Basal cell carcinoma     BPH (benign prostatic hyperplasia)     s/p TURP    Carotid stenosis     Chronic kidney disease     Claudication     Coronary artery disease     DDD (degenerative disc disease) 10/21/2013    Diabetes mellitus with renal complications     Disc disease, degenerative, cervical     Encounter for blood transfusion     GERD (gastroesophageal reflux disease)     Gout, chronic     History of ulcerative colitis     s/p colectomy and ileostomy    HLD (hyperlipidemia)     HTN (hypertension)     Ileostomy in place 1982    RBBB     Squamous cell carcinoma 03/08/2018    Left superior helix near insertion    Squamous cell carcinoma 04/12/2018    Left forearm x 5    Ventricular tachycardia        Past Surgical History:  Past Surgical History:   Procedure Laterality Date    cardiac stents      CATARACT EXTRACTION Bilateral     CERVICAL FUSION      colectomy and ileostomy  1985    ENDOSCOPIC ULTRASOUND OF UPPER GASTROINTESTINAL TRACT N/A 2/10/2023    Procedure: ULTRASOUND, UPPER GI TRACT, ENDOSCOPIC;  Surgeon: Poli Fuller MD;  Location: [facility] ENDO;  Service: Endoscopy;  Laterality: N/A;    ERCP N/A 2/10/2023    Procedure: ERCP (ENDOSCOPIC RETROGRADE CHOLANGIOPANCREATOGRAPHY);  Surgeon: Poli Fuller MD;  Location: [facility] ENDO;  Service: Endoscopy;  Laterality: N/A;    EXCISION OF PAROTID GLAND Left 12/18/2020    Procedure: EXCISION, PAROTID GLAND;  Surgeon: Michael Pinzon MD;  Location: [address];  Service: ENT;  Laterality: Left;    INSERTION OF IMPLANTABLE LOOP RECORDER  06/07/2021     INSERTION OF IMPLANTABLE LOOP RECORDER Left 6/7/2021    Procedure: INSERTION, IMPLANTABLE LOOP RECORDER;  Surgeon: Romario Dao MD;  Location: [facility] CATH LAB;  Service: Cardiology;  Laterality: Left;    LEFT HEART CATHETERIZATION Right 4/15/2021    Procedure: CATHETERIZATION, HEART, LEFT;  Surgeon: Marcio Jones MD;  Location: [facility] CATH LAB;  Service: Cardiology;  Laterality: Right;    LYSIS OF ADHESIONS N/A 11/9/2020    Procedure: LYSIS, ADHESIONS,  ERAS low;  Surgeon: CED Haley MD;  Location: [facility] OR 2ND FLR;  Service: Colon and Rectal;  Laterality: N/A;    POUCHOSCOPY N/A 4/6/2022    Procedure: ENDOSCOPY, POUCH, SMALL INTESTINE, DIAGNOSTIC;  Surgeon: Dieter Juarez MD;  Location: [facility] ENDO (2ND FLR);  Service: Endoscopy;  Laterality: N/A;    REPAIR, HERNIA, PARASTOMAL N/A 11/9/2020    Procedure: REPAIR, HERNIA, PARASTOMAL;  Surgeon: CED Haley MD;  Location: [facility] OR 2ND FLR;  Service: Colon and Rectal;  Laterality: N/A;    TRANSURETHRAL RESECTION OF PROSTATE (TURP) WITHOUT USE OF LASER N/A 1/23/2019    Procedure: TURP, WITHOUT USING LASER BIPOLAR;  Surgeon: Catarino Mota MD;  Location: [facility] OR 1ST FLR;  Service: Urology;  Laterality: N/A;  1.5 HOURS       Social History:  Social History     Socioeconomic History    Marital status:     Number of children: 1   Occupational History    Occupation:  x 44 years     Comment: Retired    Occupation: [place]    Tobacco Use    Smoking status: Never    Smokeless tobacco: Never   Substance and Sexual Activity    Alcohol use: No    Drug use: No    Sexual activity: Not Currently     Partners: Female     Social Determinants of Health     Financial Resource Strain: Low Risk     Difficulty of Paying Living Expenses: Not hard at all   Food Insecurity: No Food Insecurity    Worried About Running Out of Food in the Last Year: Never true    Ran Out of Food in the Last Year: Never true   Transportation  Needs: No Transportation Needs    Lack of Transportation (Medical): No    Lack of Transportation (Non-Medical): No   Physical Activity: Inactive    Days of Exercise per Week: 0 days    Minutes of Exercise per Session: 0 min   Stress: Stress Concern Present    Feeling of Stress : To some extent   Social Connections: Moderately Isolated    Frequency of Communication with Friends and Family: More than three times a week    Frequency of Social Gatherings with Friends and Family: More than three times a week    Attends Hoahaoism Services: Never    Active Member of Clubs or Organizations: Yes    Attends Club or Organization Meetings: Never    Marital Status:    Housing Stability: Low Risk     Unable to Pay for Housing in the Last Year: No    Number of Places Lived in the Last Year: 1    Unstable Housing in the Last Year: No       Medications:  Current Facility-Administered Medications on File Prior to Encounter   Medication Dose Route Frequency Provider Last Rate Last Admin    sodium chloride 0.9% in 1,000 mL infusion   Intravenous Continuous Paper Order         Current Outpatient Medications on File Prior to Encounter   Medication Sig Dispense Refill    allopurinoL (ZYLOPRIM) 300 MG tablet Take 1.5 tablets (450 mg total) by mouth once daily. 135 tablet 3    amiodarone (PACERONE) 200 MG Tab Take 1 tablet (200 mg total) by mouth 2 (two) times daily.      aspirin (ECOTRIN) 81 MG EC tablet Take 81 mg by mouth once daily.      brimonidine 0.2% (ALPHAGAN) 0.2 % Drop INSTILL 1 DROP INTO EACH EYE TWICE DAILY 20 mL 0    calcium citrate-vitamin D3 (CITRACAL + D MAXIMUM) 315 mg-6.25 mcg (250 unit) Tab Take 1 tablet by mouth once daily. 120 tablet 3    carvediloL (COREG) 6.25 MG tablet Take 1 tablet (6.25 mg total) by mouth 2 (two) times daily with meals. 180 tablet 0    droxidopa 100 mg Cap Take 2 capsules by mouth once daily. 400mg daily      FIBER, CALCIUM POLYCARBOPHIL, 625 mg tablet Take 3 tablets  by mouth before dinner.      glimepiride (AMARYL) 4 MG tablet Take 4 mg by mouth 2 (two) times daily before meals.      pantoprazole (PROTONIX) 40 MG tablet Take 40 mg by mouth once daily.      pravastatin (PRAVACHOL) 80 MG tablet Take 1 tablet (80 mg total) by mouth once daily. 90 tablet 0    ACCU-CHEK PAUL CONTROL SOLN Soln 1 drop by NOT APPLICABLE route once daily.      ACCU-CHEK SOFTCLIX LANCETS Misc Accucheck tid 200 each 1    ALPRAZolam (XANAX) 0.5 MG tablet Take 0.5 mg by mouth nightly as needed for Anxiety.      BD ALCOHOL SWABS PadM       blood sugar diagnostic (ONETOUCH ULTRA TEST) Strp USE ONE STRIP TO CHECK GLUCOSE EVERY  each 11    blood-glucose meter (ACCU-CHEK GUIDE GLUCOSE METER) [facility] Accucheck BID 1 each 0    droxidopa 300 mg Cap Take 600 mg by mouth. 2 tablets three times a day         Allergies:  Review of patient's allergies indicates:   Allergen Reactions    Crestor [rosuvastatin]     Lipitor [atorvastatin]        Active Problems:  Patient Active Problem List   Diagnosis    Type 2 diabetes mellitus with stage 3 chronic kidney disease, without long-term current use of insulin    Essential hypertension    Chronic renal impairment    Idiopathic chronic gout of multiple sites with tophus    HLD (hyperlipidemia)    BPH (benign prostatic hyperplasia)    Disc disease, degenerative, cervical    GERD (gastroesophageal reflux disease)    History of ulcerative colitis    CKD (chronic kidney disease) stage 3, GFR 30-59 ml/min    Proteinuria    DDD (degenerative disc disease)    Generalized osteoarthritis    Elevated PSA    S/P TURP    CAD (coronary artery disease)    Presence of arterial stent    GRADY (acute kidney injury)    Syncope due to orthostatic hypotension    Abnormal LFTs (liver function tests)    Chest pain    SBO (small bowel obstruction)    High output ileostomy    History of gout    History of elevated PSA    History of BPH    Bowel obstruction     Parastomal hernia with obstruction and without gangrene    Incisional hernia, without obstruction or gangrene    Hyperkalemia    Metabolic acidosis with normal anion gap and bicarbonate losses    Pleural plaque    Aortic atherosclerosis    Type 2 diabetes mellitus with diabetic polyneuropathy, without long-term current use of insulin    Ileostomy in place    Status post repair of ventral hernia    Parotid mass    Prostate cancer    Polyneuropathy associated with underlying disease    Stage 4 chronic kidney disease    VT (ventricular tachycardia)    Paroxysmal ventricular tachycardia    Colostomy present    Hypokalemia    Dehydration    Abnormal EKG    Aortic aneurysm    Bifascicular block    Prolonged QT interval    Cholestatic jaundice    Hypoglycemia    Headache       Diagnostic Studies:    CBC:  Recent Labs   Lab 02/13/23  0237   WBC 3.45*   RBC 3.56*   HGB 10.2*   HCT 31.6*   *   MCV 89   MCH 28.7   MCHC 32.3        CMP:  Recent Labs   Lab 02/13/23  0238   CALCIUM 7.6*   PROT 5.5*      K 3.3*   CO2 19*   *   BUN 27*   CREATININE 2.3*   ALKPHOS 356*   ALT 85*   AST 43*   BILITOT 1.9*     EKG (2/8/23):  Normal sinus rhythm   Right bundle branch block   Left anterior fascicular block    Bifascicular block   Anterior infarct (cited on or before 07-JUN-2021)     TTE (12/16/22):   The left ventricle is normal in size with mild concentric hypertrophy and normal systolic function.   The estimated ejection fraction is 60%. Technically difficult study. EF is based on limited views with poor endocardial border visualization.   Indeterminate left ventricular diastolic function.   Normal right ventricular size with normal right ventricular systolic function.   PASP is 32 plus central venous pressure which could not be determined.   The ascending aorta is mildly dilated.    24 Hour Vitals:  Temp:  [35.9 °C (96.6 °F)-36.9 °C (98.4 °F)] 36.4 °C (97.6 °F)  Pulse:  [49-65]  61  Resp:  [16-20] 16  SpO2:  [95 %-100 %] 97 %  BP: (142-188)/(78-98) 164/87   See Nursing Charting For Additional Vitals      Pre-op Assessment    I have reviewed the Patient Summary Reports.     I have reviewed the Nursing Notes.       Review of Systems  Anesthesia Hx:  No problems with previous Anesthesia   Denies Personal Hx of Anesthesia complications.   Social:  Non-Smoker, No Alcohol Use    Hematology/Oncology:         -- Anemia: Hematology Comments: Thrombocytopenia   Cardiovascular:   Exercise tolerance: good Hypertension CAD  Dysrhythmias  hyperlipidemia ECG has been reviewed.    Pulmonary:  Pulmonary Normal    Renal/:   Chronic Renal Disease, CKD BPH    Hepatic/GI:   H/O ulcerative colitis s/p colectomy and ileostomy    Cholestatic jaundice   Musculoskeletal:  Spine Disorders: cervical Disc disease and Degenerative disease    Neurological:   Headaches    Endocrine:   Diabetes, type 2        Physical Exam  General: Well nourished and Cooperative    Airway:  Mallampati: II   Mouth Opening: Normal  TM Distance: Normal      Dental:  Intact    Chest/Lungs:  Clear to auscultation, Normal Respiratory Rate    Heart:  Rate: Normal  Rhythm: Regular Rhythm        Anesthesia Plan  Type of Anesthesia, risks & benefits discussed:    Anesthesia Type: Gen ETT  Intra-op Monitoring Plan: Standard ASA Monitors  Post Op Pain Control Plan: multimodal analgesia and IV/PO Opioids PRN  Induction:  IV  Airway Plan: Direct and Video, Post-Induction  Informed Consent: Informed consent signed with the Patient and all parties understand the risks and agree with anesthesia plan.  All questions answered. Patient consented to blood products? Yes  ASA Score: 3  Anesthesia Plan Notes:   GA with OETT  Standard ASA monitors  Recovery in PACU  PONV: 2    Ready For Surgery From Anesthesia Perspective.     .

## 2023-02-13 NOTE — ASSESSMENT & PLAN NOTE
Patient does not have an ACP. Form given today. Patient has reported persistent headaches since admission.  On 02/12 he reported unilateral blurring and lacrimation.  CT head ruled out bleed.      Patient denies any history of migraines but states he may be under stress and has been eating poorly which may be contributing to his headaches.    Neurology on board.  Appreciate recommendations.

## 2023-02-14 ENCOUNTER — OUTPATIENT CASE MANAGEMENT (OUTPATIENT)
Dept: ADMINISTRATIVE | Facility: OTHER | Age: 73
End: 2023-02-14
Payer: MEDICARE

## 2023-02-14 ENCOUNTER — ANESTHESIA (OUTPATIENT)
Dept: SURGERY | Facility: HOSPITAL | Age: 73
DRG: 415 | End: 2023-02-14
Payer: MEDICARE

## 2023-02-14 PROBLEM — E87.1 HYPONATREMIA: Status: ACTIVE | Noted: 2023-02-14

## 2023-02-14 PROBLEM — H53.8 BLURRED VISION, RIGHT EYE: Status: ACTIVE | Noted: 2023-02-14

## 2023-02-14 PROBLEM — E16.2 HYPOGLYCEMIA: Status: RESOLVED | Noted: 2023-02-10 | Resolved: 2023-02-14

## 2023-02-14 LAB
ALBUMIN SERPL BCP-MCNC: 2.8 G/DL (ref 3.5–5.2)
ALBUMIN SERPL BCP-MCNC: 2.8 G/DL (ref 3.5–5.2)
ALP SERPL-CCNC: 406 U/L (ref 55–135)
ALP SERPL-CCNC: 414 U/L (ref 55–135)
ALT SERPL W/O P-5'-P-CCNC: 79 U/L (ref 10–44)
ALT SERPL W/O P-5'-P-CCNC: 82 U/L (ref 10–44)
ANION GAP SERPL CALC-SCNC: 11 MMOL/L (ref 8–16)
ANION GAP SERPL CALC-SCNC: 14 MMOL/L (ref 8–16)
AST SERPL-CCNC: 45 U/L (ref 10–40)
AST SERPL-CCNC: 47 U/L (ref 10–40)
BILIRUB SERPL-MCNC: 2 MG/DL (ref 0.1–1)
BILIRUB SERPL-MCNC: 2.1 MG/DL (ref 0.1–1)
BUN SERPL-MCNC: 26 MG/DL (ref 8–23)
BUN SERPL-MCNC: 27 MG/DL (ref 8–23)
CALCIUM SERPL-MCNC: 8.8 MG/DL (ref 8.7–10.5)
CALCIUM SERPL-MCNC: 8.8 MG/DL (ref 8.7–10.5)
CHLORIDE SERPL-SCNC: 107 MMOL/L (ref 95–110)
CHLORIDE SERPL-SCNC: 94 MMOL/L (ref 95–110)
CO2 SERPL-SCNC: 20 MMOL/L (ref 23–29)
CO2 SERPL-SCNC: 21 MMOL/L (ref 23–29)
CREAT SERPL-MCNC: 2.3 MG/DL (ref 0.5–1.4)
CREAT SERPL-MCNC: 2.4 MG/DL (ref 0.5–1.4)
EST. GFR  (NO RACE VARIABLE): 28 ML/MIN/1.73 M^2
EST. GFR  (NO RACE VARIABLE): 29 ML/MIN/1.73 M^2
GLUCOSE SERPL-MCNC: 104 MG/DL (ref 70–110)
GLUCOSE SERPL-MCNC: 130 MG/DL (ref 70–110)
POCT GLUCOSE: 126 MG/DL (ref 70–110)
POCT GLUCOSE: 165 MG/DL (ref 70–110)
POCT GLUCOSE: 174 MG/DL (ref 70–110)
POTASSIUM SERPL-SCNC: 3.3 MMOL/L (ref 3.5–5.1)
POTASSIUM SERPL-SCNC: 3.5 MMOL/L (ref 3.5–5.1)
PROT SERPL-MCNC: 6.4 G/DL (ref 6–8.4)
PROT SERPL-MCNC: 6.5 G/DL (ref 6–8.4)
SODIUM SERPL-SCNC: 125 MMOL/L (ref 136–145)
SODIUM SERPL-SCNC: 142 MMOL/L (ref 136–145)

## 2023-02-14 PROCEDURE — 36415 COLL VENOUS BLD VENIPUNCTURE: CPT | Mod: HCNC | Performed by: STUDENT IN AN ORGANIZED HEALTH CARE EDUCATION/TRAINING PROGRAM

## 2023-02-14 PROCEDURE — 11000001 HC ACUTE MED/SURG PRIVATE ROOM: Mod: HCNC

## 2023-02-14 PROCEDURE — C9113 INJ PANTOPRAZOLE SODIUM, VIA: HCPCS | Mod: HCNC | Performed by: STUDENT IN AN ORGANIZED HEALTH CARE EDUCATION/TRAINING PROGRAM

## 2023-02-14 PROCEDURE — 88304 TISSUE EXAM BY PATHOLOGIST: CPT | Mod: HCNC | Performed by: PATHOLOGY

## 2023-02-14 PROCEDURE — 80053 COMPREHEN METABOLIC PANEL: CPT | Mod: HCNC | Performed by: STUDENT IN AN ORGANIZED HEALTH CARE EDUCATION/TRAINING PROGRAM

## 2023-02-14 PROCEDURE — 96372 THER/PROPH/DIAG INJ SC/IM: CPT | Mod: 59 | Performed by: HOSPITALIST

## 2023-02-14 PROCEDURE — 47600 PR REMOVAL GALLBLADDER: ICD-10-PCS | Mod: 22,HCNC,, | Performed by: SURGERY

## 2023-02-14 PROCEDURE — G0378 HOSPITAL OBSERVATION PER HR: HCPCS | Mod: HCNC

## 2023-02-14 PROCEDURE — 63600175 PHARM REV CODE 636 W HCPCS: Mod: HCNC | Performed by: HOSPITALIST

## 2023-02-14 PROCEDURE — 25000003 PHARM REV CODE 250: Mod: HCNC | Performed by: NURSE ANESTHETIST, CERTIFIED REGISTERED

## 2023-02-14 PROCEDURE — 36000706: Mod: HCNC | Performed by: SURGERY

## 2023-02-14 PROCEDURE — D9220A PRA ANESTHESIA: Mod: HCNC,ANES,, | Performed by: ANESTHESIOLOGY

## 2023-02-14 PROCEDURE — 63600175 PHARM REV CODE 636 W HCPCS: Mod: HCNC | Performed by: NURSE ANESTHETIST, CERTIFIED REGISTERED

## 2023-02-14 PROCEDURE — D9220A PRA ANESTHESIA: ICD-10-PCS | Mod: HCNC,ANES,, | Performed by: ANESTHESIOLOGY

## 2023-02-14 PROCEDURE — P9045 ALBUMIN (HUMAN), 5%, 250 ML: HCPCS | Mod: JZ,JG,HCNC | Performed by: NURSE ANESTHETIST, CERTIFIED REGISTERED

## 2023-02-14 PROCEDURE — 99499 UNLISTED E&M SERVICE: CPT | Mod: HCNC,95,, | Performed by: PSYCHIATRY & NEUROLOGY

## 2023-02-14 PROCEDURE — 88304 TISSUE EXAM BY PATHOLOGIST: CPT | Mod: 26,HCNC,, | Performed by: PATHOLOGY

## 2023-02-14 PROCEDURE — 96372 THER/PROPH/DIAG INJ SC/IM: CPT | Performed by: HOSPITALIST

## 2023-02-14 PROCEDURE — 63600175 PHARM REV CODE 636 W HCPCS: Mod: HCNC | Performed by: ANESTHESIOLOGY

## 2023-02-14 PROCEDURE — D9220A PRA ANESTHESIA: Mod: HCNC,CRNA,, | Performed by: NURSE ANESTHETIST, CERTIFIED REGISTERED

## 2023-02-14 PROCEDURE — 37000009 HC ANESTHESIA EA ADD 15 MINS: Mod: HCNC | Performed by: SURGERY

## 2023-02-14 PROCEDURE — 47600 CHOLECYSTECTOMY: CPT | Mod: 22,HCNC,, | Performed by: SURGERY

## 2023-02-14 PROCEDURE — 99499 NO LOS: ICD-10-PCS | Mod: HCNC,95,, | Performed by: PSYCHIATRY & NEUROLOGY

## 2023-02-14 PROCEDURE — 36415 COLL VENOUS BLD VENIPUNCTURE: CPT | Mod: HCNC | Performed by: HOSPITALIST

## 2023-02-14 PROCEDURE — 37000008 HC ANESTHESIA 1ST 15 MINUTES: Mod: HCNC | Performed by: SURGERY

## 2023-02-14 PROCEDURE — 25000003 PHARM REV CODE 250: Mod: HCNC | Performed by: SURGERY

## 2023-02-14 PROCEDURE — 27201423 OPTIME MED/SURG SUP & DEVICES STERILE SUPPLY: Mod: HCNC | Performed by: SURGERY

## 2023-02-14 PROCEDURE — 25000003 PHARM REV CODE 250: Mod: HCNC | Performed by: HOSPITALIST

## 2023-02-14 PROCEDURE — 80053 COMPREHEN METABOLIC PANEL: CPT | Mod: 91,HCNC | Performed by: HOSPITALIST

## 2023-02-14 PROCEDURE — 36000707: Mod: HCNC | Performed by: SURGERY

## 2023-02-14 PROCEDURE — 88304 PR  SURG PATH,LEVEL III: ICD-10-PCS | Mod: 26,HCNC,, | Performed by: PATHOLOGY

## 2023-02-14 PROCEDURE — 99900035 HC TECH TIME PER 15 MIN (STAT): Mod: HCNC

## 2023-02-14 PROCEDURE — 71000039 HC RECOVERY, EACH ADD'L HOUR: Mod: HCNC | Performed by: SURGERY

## 2023-02-14 PROCEDURE — D9220A PRA ANESTHESIA: ICD-10-PCS | Mod: HCNC,CRNA,, | Performed by: NURSE ANESTHETIST, CERTIFIED REGISTERED

## 2023-02-14 PROCEDURE — 25000003 PHARM REV CODE 250: Mod: HCNC | Performed by: STUDENT IN AN ORGANIZED HEALTH CARE EDUCATION/TRAINING PROGRAM

## 2023-02-14 PROCEDURE — 71000033 HC RECOVERY, INTIAL HOUR: Mod: HCNC | Performed by: SURGERY

## 2023-02-14 PROCEDURE — 63600175 PHARM REV CODE 636 W HCPCS: Mod: HCNC | Performed by: STUDENT IN AN ORGANIZED HEALTH CARE EDUCATION/TRAINING PROGRAM

## 2023-02-14 PROCEDURE — 94761 N-INVAS EAR/PLS OXIMETRY MLT: CPT | Mod: HCNC

## 2023-02-14 RX ORDER — ONDANSETRON 2 MG/ML
INJECTION INTRAMUSCULAR; INTRAVENOUS
Status: DISCONTINUED | OUTPATIENT
Start: 2023-02-14 | End: 2023-02-14

## 2023-02-14 RX ORDER — POTASSIUM CHLORIDE 20 MEQ/1
40 TABLET, EXTENDED RELEASE ORAL ONCE
Status: COMPLETED | OUTPATIENT
Start: 2023-02-14 | End: 2023-02-14

## 2023-02-14 RX ORDER — PANTOPRAZOLE SODIUM 40 MG/1
40 TABLET, DELAYED RELEASE ORAL DAILY
Status: DISCONTINUED | OUTPATIENT
Start: 2023-02-15 | End: 2023-02-16 | Stop reason: HOSPADM

## 2023-02-14 RX ORDER — NEOSTIGMINE METHYLSULFATE 1 MG/ML
INJECTION, SOLUTION INTRAVENOUS
Status: DISCONTINUED | OUTPATIENT
Start: 2023-02-14 | End: 2023-02-14

## 2023-02-14 RX ORDER — HYDROMORPHONE HYDROCHLORIDE 1 MG/ML
0.5 INJECTION, SOLUTION INTRAMUSCULAR; INTRAVENOUS; SUBCUTANEOUS EVERY 6 HOURS PRN
Status: DISCONTINUED | OUTPATIENT
Start: 2023-02-14 | End: 2023-02-16

## 2023-02-14 RX ORDER — MIDAZOLAM HYDROCHLORIDE 1 MG/ML
INJECTION INTRAMUSCULAR; INTRAVENOUS
Status: DISCONTINUED | OUTPATIENT
Start: 2023-02-14 | End: 2023-02-14

## 2023-02-14 RX ORDER — FENTANYL CITRATE 50 UG/ML
INJECTION, SOLUTION INTRAMUSCULAR; INTRAVENOUS
Status: DISCONTINUED | OUTPATIENT
Start: 2023-02-14 | End: 2023-02-14

## 2023-02-14 RX ORDER — EPHEDRINE SULFATE 50 MG/ML
INJECTION, SOLUTION INTRAVENOUS
Status: DISCONTINUED | OUTPATIENT
Start: 2023-02-14 | End: 2023-02-14

## 2023-02-14 RX ORDER — ACETAMINOPHEN 10 MG/ML
INJECTION, SOLUTION INTRAVENOUS
Status: DISCONTINUED | OUTPATIENT
Start: 2023-02-14 | End: 2023-02-14

## 2023-02-14 RX ORDER — ALBUMIN HUMAN 50 G/1000ML
SOLUTION INTRAVENOUS CONTINUOUS PRN
Status: DISCONTINUED | OUTPATIENT
Start: 2023-02-14 | End: 2023-02-14

## 2023-02-14 RX ORDER — CEFAZOLIN SODIUM 1 G/3ML
INJECTION, POWDER, FOR SOLUTION INTRAMUSCULAR; INTRAVENOUS
Status: DISCONTINUED | OUTPATIENT
Start: 2023-02-14 | End: 2023-02-14

## 2023-02-14 RX ORDER — ROCURONIUM BROMIDE 10 MG/ML
INJECTION, SOLUTION INTRAVENOUS
Status: DISCONTINUED | OUTPATIENT
Start: 2023-02-14 | End: 2023-02-14

## 2023-02-14 RX ORDER — DEXAMETHASONE SODIUM PHOSPHATE 4 MG/ML
INJECTION, SOLUTION INTRA-ARTICULAR; INTRALESIONAL; INTRAMUSCULAR; INTRAVENOUS; SOFT TISSUE
Status: DISCONTINUED | OUTPATIENT
Start: 2023-02-14 | End: 2023-02-14

## 2023-02-14 RX ORDER — FENTANYL CITRATE 50 UG/ML
25 INJECTION, SOLUTION INTRAMUSCULAR; INTRAVENOUS EVERY 5 MIN PRN
Status: DISCONTINUED | OUTPATIENT
Start: 2023-02-14 | End: 2023-02-14

## 2023-02-14 RX ORDER — PHENYLEPHRINE HYDROCHLORIDE 10 MG/ML
INJECTION INTRAVENOUS
Status: DISCONTINUED | OUTPATIENT
Start: 2023-02-14 | End: 2023-02-14

## 2023-02-14 RX ORDER — LIDOCAINE HYDROCHLORIDE 10 MG/ML
INJECTION, SOLUTION EPIDURAL; INFILTRATION; INTRACAUDAL; PERINEURAL
Status: DISCONTINUED | OUTPATIENT
Start: 2023-02-14 | End: 2023-02-14 | Stop reason: HOSPADM

## 2023-02-14 RX ORDER — LIDOCAINE HCL/PF 100 MG/5ML
SYRINGE (ML) INTRAVENOUS
Status: DISCONTINUED | OUTPATIENT
Start: 2023-02-14 | End: 2023-02-14

## 2023-02-14 RX ORDER — ONDANSETRON 2 MG/ML
4 INJECTION INTRAMUSCULAR; INTRAVENOUS DAILY PRN
Status: DISCONTINUED | OUTPATIENT
Start: 2023-02-14 | End: 2023-02-16 | Stop reason: HOSPADM

## 2023-02-14 RX ORDER — BUPIVACAINE HYDROCHLORIDE 5 MG/ML
INJECTION, SOLUTION PERINEURAL
Status: DISCONTINUED | OUTPATIENT
Start: 2023-02-14 | End: 2023-02-14 | Stop reason: HOSPADM

## 2023-02-14 RX ORDER — HYDROMORPHONE HYDROCHLORIDE 2 MG/ML
0.2 INJECTION, SOLUTION INTRAMUSCULAR; INTRAVENOUS; SUBCUTANEOUS EVERY 5 MIN PRN
Status: DISCONTINUED | OUTPATIENT
Start: 2023-02-14 | End: 2023-02-14

## 2023-02-14 RX ORDER — ENOXAPARIN SODIUM 100 MG/ML
40 INJECTION SUBCUTANEOUS EVERY 24 HOURS
Status: DISCONTINUED | OUTPATIENT
Start: 2023-02-14 | End: 2023-02-16 | Stop reason: HOSPADM

## 2023-02-14 RX ORDER — PROPOFOL 10 MG/ML
VIAL (ML) INTRAVENOUS
Status: DISCONTINUED | OUTPATIENT
Start: 2023-02-14 | End: 2023-02-14

## 2023-02-14 RX ORDER — OXYCODONE AND ACETAMINOPHEN 5; 325 MG/1; MG/1
1 TABLET ORAL EVERY 4 HOURS PRN
Status: DISCONTINUED | OUTPATIENT
Start: 2023-02-14 | End: 2023-02-16

## 2023-02-14 RX ADMIN — PANTOPRAZOLE SODIUM 40 MG: 40 INJECTION, POWDER, LYOPHILIZED, FOR SOLUTION INTRAVENOUS at 08:02

## 2023-02-14 RX ADMIN — HYDROXYZINE HYDROCHLORIDE 50 MG: 25 TABLET, FILM COATED ORAL at 04:02

## 2023-02-14 RX ADMIN — EPHEDRINE SULFATE 10 MG: 50 INJECTION, SOLUTION INTRAMUSCULAR; INTRAVENOUS; SUBCUTANEOUS at 11:02

## 2023-02-14 RX ADMIN — DEXAMETHASONE SODIUM PHOSPHATE 8 MG: 4 INJECTION, SOLUTION INTRA-ARTICULAR; INTRALESIONAL; INTRAMUSCULAR; INTRAVENOUS; SOFT TISSUE at 11:02

## 2023-02-14 RX ADMIN — ROCURONIUM BROMIDE 10 MG: 10 INJECTION, SOLUTION INTRAVENOUS at 12:02

## 2023-02-14 RX ADMIN — POTASSIUM CHLORIDE 40 MEQ: 1500 TABLET, EXTENDED RELEASE ORAL at 10:02

## 2023-02-14 RX ADMIN — HYDROMORPHONE HYDROCHLORIDE 0.2 MG: 2 INJECTION INTRAMUSCULAR; INTRAVENOUS; SUBCUTANEOUS at 02:02

## 2023-02-14 RX ADMIN — LIDOCAINE HYDROCHLORIDE 100 MG: 20 INJECTION, SOLUTION INTRAVENOUS at 11:02

## 2023-02-14 RX ADMIN — ONDANSETRON 8 MG: 2 INJECTION, SOLUTION INTRAMUSCULAR; INTRAVENOUS at 12:02

## 2023-02-14 RX ADMIN — HYDROMORPHONE HYDROCHLORIDE 0.2 MG: 2 INJECTION INTRAMUSCULAR; INTRAVENOUS; SUBCUTANEOUS at 01:02

## 2023-02-14 RX ADMIN — HYPROMELLOSE 2910 1 DROP: 5 SOLUTION/ DROPS OPHTHALMIC at 08:02

## 2023-02-14 RX ADMIN — ALBUMIN (HUMAN): 12.5 SOLUTION INTRAVENOUS at 12:02

## 2023-02-14 RX ADMIN — FENTANYL CITRATE 50 MCG: 50 INJECTION, SOLUTION INTRAMUSCULAR; INTRAVENOUS at 11:02

## 2023-02-14 RX ADMIN — PHENYLEPHRINE HYDROCHLORIDE 100 MCG: 10 INJECTION INTRAVENOUS at 11:02

## 2023-02-14 RX ADMIN — BRIMONIDINE TARTRATE 1 DROP: 2 SOLUTION OPHTHALMIC at 08:02

## 2023-02-14 RX ADMIN — OXYCODONE HYDROCHLORIDE AND ACETAMINOPHEN 1 TABLET: 5; 325 TABLET ORAL at 02:02

## 2023-02-14 RX ADMIN — ALLOPURINOL 300 MG: 100 TABLET ORAL at 08:02

## 2023-02-14 RX ADMIN — HYDROMORPHONE HYDROCHLORIDE 0.5 MG: 1 INJECTION, SOLUTION INTRAMUSCULAR; INTRAVENOUS; SUBCUTANEOUS at 09:02

## 2023-02-14 RX ADMIN — OXYCODONE HYDROCHLORIDE AND ACETAMINOPHEN 1 TABLET: 5; 325 TABLET ORAL at 06:02

## 2023-02-14 RX ADMIN — AMIODARONE HYDROCHLORIDE 200 MG: 200 TABLET ORAL at 08:02

## 2023-02-14 RX ADMIN — PHENYLEPHRINE HYDROCHLORIDE 100 MCG: 10 INJECTION INTRAVENOUS at 12:02

## 2023-02-14 RX ADMIN — GLYCOPYRROLATE 0.2 MG: 0.2 INJECTION, SOLUTION INTRAMUSCULAR; INTRAVITREAL at 11:02

## 2023-02-14 RX ADMIN — CEFAZOLIN 2 G: 330 INJECTION, POWDER, FOR SOLUTION INTRAMUSCULAR; INTRAVENOUS at 11:02

## 2023-02-14 RX ADMIN — SIMETHICONE 80 MG: 80 TABLET, CHEWABLE ORAL at 08:02

## 2023-02-14 RX ADMIN — MIDAZOLAM HYDROCHLORIDE 2 MG: 1 INJECTION, SOLUTION INTRAMUSCULAR; INTRAVENOUS at 11:02

## 2023-02-14 RX ADMIN — PROPOFOL 120 MG: 10 INJECTION, EMULSION INTRAVENOUS at 11:02

## 2023-02-14 RX ADMIN — SUGAMMADEX 200 MG: 100 INJECTION, SOLUTION INTRAVENOUS at 01:02

## 2023-02-14 RX ADMIN — ROCURONIUM BROMIDE 50 MG: 10 INJECTION, SOLUTION INTRAVENOUS at 11:02

## 2023-02-14 RX ADMIN — ASPIRIN 81 MG: 81 TABLET, COATED ORAL at 08:02

## 2023-02-14 RX ADMIN — PHENYLEPHRINE HYDROCHLORIDE 100 MCG: 10 INJECTION INTRAVENOUS at 01:02

## 2023-02-14 RX ADMIN — ACETAMINOPHEN 650 MG: 325 TABLET ORAL at 03:02

## 2023-02-14 RX ADMIN — SODIUM CHLORIDE: 0.9 INJECTION, SOLUTION INTRAVENOUS at 11:02

## 2023-02-14 RX ADMIN — ENOXAPARIN SODIUM 40 MG: 30 INJECTION SUBCUTANEOUS at 04:02

## 2023-02-14 RX ADMIN — HYDROMORPHONE HYDROCHLORIDE 0.5 MG: 1 INJECTION, SOLUTION INTRAMUSCULAR; INTRAVENOUS; SUBCUTANEOUS at 03:02

## 2023-02-14 RX ADMIN — AMLODIPINE BESYLATE 5 MG: 5 TABLET ORAL at 08:02

## 2023-02-14 RX ADMIN — ACETAMINOPHEN 1000 MG: 10 INJECTION, SOLUTION INTRAVENOUS at 11:02

## 2023-02-14 NOTE — PROGRESS NOTES
[facility]  General Surgery  Progress Note    Subjective:     History of Present Illness:  No notes on file    Post-Op Info:  Procedure(s) (LRB):  ULTRASOUND, UPPER GI TRACT, ENDOSCOPIC (N/A)  ERCP (ENDOSCOPIC RETROGRADE CHOLANGIOPANCREATOGRAPHY) (N/A)   4 Days Post-Op     Interval History: NAEO. No pain reported this am. Liver studies essentially unchanged. AF. HDS.     Medications:  Continuous Infusions:  Scheduled Meds:   allopurinoL  300 mg Oral Daily    amiodarone  200 mg Oral BID    amLODIPine  5 mg Oral Daily    artificial tears  1 drop Both Eyes BID    aspirin  81 mg Oral Daily    brimonidine 0.2%  1 drop Both Eyes BID    enoxaparin  30 mg Subcutaneous Daily    pantoprazole  40 mg Intravenous Daily     PRN Meds:acetaminophen, aluminum-magnesium hydroxide-simethicone, butalbital-acetaminophen-caffeine -40 mg, dextrose 10%, dextrose 10%, dextrose 10%, dextrose 10%, glucagon (human recombinant), glucagon (human recombinant), glucose, glucose, glucose, glucose, hydrALAZINE, hydrOXYzine HCL, insulin aspart U-100, meclizine, melatonin, naloxone, ondansetron, simethicone, sodium chloride 0.9%     Review of patient's allergies indicates:   Allergen Reactions    Crestor [rosuvastatin]     Lipitor [atorvastatin]      Objective:     Vital Signs (Most Recent):  Temp: 96.5 °F (35.8 °C) (02/14/23 0612)  Pulse: 75 (02/14/23 0612)  Resp: 18 (02/14/23 0612)  BP: (!) 148/87 (02/14/23 0612)  SpO2: (!) 94 % (02/14/23 0612)   Vital Signs (24h Range):  Temp:  [96.5 °F (35.8 °C)-98.4 °F (36.9 °C)] 96.5 °F (35.8 °C)  Pulse:  [57-77] 75  Resp:  [16-19] 18  SpO2:  [94 %-100 %] 94 %  BP: (126-182)/(79-98) 148/87     Weight: 94.3 kg (207 lb 14.3 oz)  Body mass index is 26.69 kg/m².    Intake/Output - Last 3 Shifts         02/12 0700 02/13 0659 02/13 0700 02/14 0659    P.O. 1125     I.V. (mL/kg)  1795.4 (19)    Total Intake(mL/kg) 1125 (11.9) 1795.4 (19)    Urine (mL/kg/hr) 500 (0.2)     Stool 0     Total  Output 500     Net +625 +1795.4          Urine Occurrence 5 x     Stool Occurrence 1 x             Physical Exam  Vitals reviewed.   Constitutional:       General: He is not in acute distress.  HENT:      Head: Normocephalic and atraumatic.      Nose: Nose normal.   Eyes:      General:         Right eye: No discharge.         Left eye: No discharge.   Cardiovascular:      Rate and Rhythm: Normal rate and regular rhythm.   Pulmonary:      Effort: Pulmonary effort is normal. No respiratory distress.      Breath sounds: No stridor.   Abdominal:      Comments: Soft, ND  Midline laparotomy  Ileostomy in place    Musculoskeletal:         General: Normal range of motion.      Cervical back: Normal range of motion.   Skin:     General: Skin is warm and dry.      Capillary Refill: Capillary refill takes 2 to 3 seconds.   Neurological:      General: No focal deficit present.      Mental Status: He is alert and oriented to person, place, and time.   Psychiatric:         Mood and Affect: Mood normal.         Behavior: Behavior normal.       Significant Labs:  I have reviewed all pertinent lab results within the past 24 hours.    Significant Diagnostics:  I have reviewed all pertinent imaging results/findings within the past 24 hours.    Assessment/Plan:     * Cholestatic jaundice  72M with resolving elevated LFTs and biliary colic.     -Still plan for lap possible open chato today. Consent in chart.   -NPO  -Please call with questions         Jered Grider MD  General Surgery  [facility] - Telemetry

## 2023-02-14 NOTE — NURSING
Patient returned from surgery, report received from Floridalma PACU RN. Vitals stable. Safety maintained, bed in lowest position, bed alarm on, bed wheels locked, call light with in reach. Will continue to monitor.

## 2023-02-14 NOTE — ASSESSMENT & PLAN NOTE
Patient follows with Nephrology at [facility].  Per patient he gets normal saline IV twice a week to avoid dehydration secondary to losses from his ostomy.    Renal function currently appears to be at baseline    Started on gentle hydration as anticipated to be NPO after MN  Monitor renal function closely.  Avoid nephrotoxic drugs.  Renal dose medications  Consider Nephrology consult in case of worsening renal function

## 2023-02-14 NOTE — ASSESSMENT & PLAN NOTE
Patient has reported persistent headaches since admission.  On 02/12 he reported unilateral blurring and lacrimation.  CT head ruled out bleed.      Patient denies any history of migraines but states he may be under stress and has been eating poorly which may be contributing to his headaches.    Neurology on board.  Appreciate recommendations.  - MRI pending

## 2023-02-14 NOTE — ASSESSMENT & PLAN NOTE
Blood pressure consistently elevated to systolic 170s.  At times patient has abdominal discomfort while blood pressure remains elevated even if the patient is not in any pain.  Unclear if the patient needs additional antihypertensives but given headache with persistently elevated blood pressures, amlodipine 5 mg q.d. added     Holding Coreg due to bradycardia.  Elevated bile salts can contribute to bradycardia in this clinical setting.    PRN hydralazine for SBP>160  -can consider further antihypertensive additions after surgery if patient is still elevated

## 2023-02-14 NOTE — PROGRESS NOTES
Pharmacist Intervention IV to PO Note    Jarrett Charlton Jr. is a 72 y.o. male being treated with IV medication pantoprazole    Patient Data:    Vital Signs (Most Recent):  Temp: 98.9 °F (37.2 °C) (02/14/23 0741)  Pulse: 61 (02/14/23 0749)  Resp: 16 (02/14/23 1349)  BP: (!) 171/84 (02/14/23 0741)  SpO2: 98 % (02/14/23 0741)   Vital Signs (72h Range):  Temp:  [96.1 °F (35.6 °C)-99.9 °F (37.7 °C)]   Pulse:  [47-77]   Resp:  [15-20]   BP: (120-188)/(64-98)   SpO2:  [94 %-100 %]      CBC:  Recent Labs   Lab 02/09/23  0417 02/12/23  0648 02/13/23  0237   WBC 8.70 3.66* 3.45*   RBC 4.01* 4.13* 3.56*   HGB 11.8* 11.6* 10.2*   HCT 37.0* 36.4* 31.6*   * 153 139*   MCV 92 88 89   MCH 29.4 28.1 28.7   MCHC 31.9* 31.9* 32.3     CMP:     Recent Labs   Lab 02/13/23  0238 02/14/23  0253 02/14/23  0732   * 130* 104   CALCIUM 7.6* 8.8 8.8   ALBUMIN 2.4* 2.8* 2.8*   PROT 5.5* 6.4 6.5    125* 142   K 3.3* 3.3* 3.5   CO2 19* 20* 21*   * 94* 107   BUN 27* 27* 26*   CREATININE 2.3* 2.3* 2.4*   ALKPHOS 356* 414* 406*   ALT 85* 82* 79*   AST 43* 47* 45*   BILITOT 1.9* 2.0* 2.1*       Dietary Orders:  Diet Orders            Diet Cardiac: Cardiac starting at 02/14 1335            Based on the following criteria, this patient qualifies for intravenous to oral conversion:  [x] The patients gastrointestinal tract is functioning (tolerating medications via oral or enteral route for 24 hours and tolerating food or enteral feeds for 24 hours).  [x] The patient is hemodynamically stable for 24 hours (heart rate <100 beats per minute, systolic blood pressure >99 mm Hg, and respiratory rate <20 breaths per minute).  [x] The patient shows clinical improvement (afebrile for at least 24 hours and white blood cell count downtrending or normalized). Additionally, the patient must be non-neutropenic (absolute neutrophil count >500 cells/mm3).  [x] For antimicrobials, the patient has received IV therapy for at least 24  hours.    IV medication pantoprazole will be changed to oral medication     Pharmacist's Name: Shade Rodriguez  Pharmacist's Extension: [phone]

## 2023-02-14 NOTE — ANESTHESIA PROCEDURE NOTES
Intubation    Date/Time: 2/14/2023 11:24 AM  Performed by: Jade Ling CRNA  Authorized by: Sly Casey MD     Intubation:     Induction:  Intravenous    Intubated:  Postinduction    Mask Ventilation:  Easy with oral airway    Attempts:  1    Attempted By:  CRNA    Method of Intubation:  Video laryngoscopy    Blade:  Fonseca 3    Laryngeal View Grade: Grade I - full view of cords      Difficult Airway Encountered?: No      Complications:  None    Airway Device:  Oral endotracheal tube    Airway Device Size:  7.5    Style/Cuff Inflation:  Cuffed    Inflation Amount (mL):  8    Tube secured:  23    Secured at:  The lips    Placement Verified By:  Capnometry    Complicating Factors:  Poor neck/head extension    Findings Post-Intubation:  BS equal bilateral and atraumatic/condition of teeth unchanged

## 2023-02-14 NOTE — ASSESSMENT & PLAN NOTE
72M with resolving elevated LFTs and biliary colic.     -Still plan for lap possible open chato today. Consent in chart.   -NPO  -Please call with questions

## 2023-02-14 NOTE — PROGRESS NOTES
Pharmacist Renal Dose Adjustment Note    Jarrett Charlton Jr. is a 72 y.o. male being treated with the medication enoxaparin    Patient Data:    Vital Signs (Most Recent):  Temp: 98.9 °F (37.2 °C) (02/14/23 0741)  Pulse: 61 (02/14/23 0749)  Resp: 18 (02/14/23 0741)  BP: (!) 171/84 (02/14/23 0741)  SpO2: 98 % (02/14/23 0741)   Vital Signs (72h Range):  Temp:  [96.1 °F (35.6 °C)-99.9 °F (37.7 °C)]   Pulse:  [47-77]   Resp:  [15-20]   BP: (120-188)/(64-98)   SpO2:  [94 %-100 %]      Recent Labs   Lab 02/13/23  0238 02/14/23  0253 02/14/23 0732   CREATININE 2.3* 2.3* 2.4*     Serum creatinine: 2.4 mg/dL (H) 02/14/23 0732  Estimated creatinine clearance: 31.9 mL/min (A)    Medication:enoxaparin dose: 30mg frequency q24h will be changed to medication:enoxaparin dose:40mg frequency:q24h    Pharmacist's Name: Shade Rodriguez  Pharmacist's Extension: [phone]

## 2023-02-14 NOTE — PLAN OF CARE
Problem: Adult Inpatient Plan of Care  Goal: Plan of Care Review  Outcome: Ongoing, Progressing     Problem: Diabetes Comorbidity  Goal: Blood Glucose Level Within Targeted Range  Outcome: Ongoing, Progressing     Problem: Fluid and Electrolyte Imbalance (Acute Kidney Injury/Impairment)  Goal: Fluid and Electrolyte Balance  Outcome: Ongoing, Progressing     Problem: Oral Intake Inadequate (Acute Kidney Injury/Impairment)  Goal: Optimal Nutrition Intake  Outcome: Ongoing, Progressing     Problem: Renal Function Impairment (Acute Kidney Injury/Impairment)  Goal: Effective Renal Function  Outcome: Ongoing, Progressing     Problem: Hypertension Comorbidity  Goal: Blood Pressure in Desired Range  Outcome: Ongoing, Progressing     Problem: Fall Injury Risk  Goal: Absence of Fall and Fall-Related Injury  Outcome: Ongoing, Progressing     Problem: Bleeding (Surgery Nonspecified)  Goal: Absence of Bleeding  Outcome: Ongoing, Progressing     Problem: Bowel Motility Impaired (Surgery Nonspecified)  Goal: Effective Bowel Elimination  Outcome: Ongoing, Progressing     Problem: Fluid and Electrolyte Imbalance (Surgery Nonspecified)  Goal: Fluid and Electrolyte Balance  Outcome: Ongoing, Progressing     Problem: Glycemic Control Impaired (Surgery Nonspecified)  Goal: Blood Glucose Level Within Targeted Range  Outcome: Ongoing, Progressing     Problem: Infection (Surgery Nonspecified)  Goal: Absence of Infection Signs and Symptoms  Outcome: Ongoing, Progressing     Problem: Ongoing Anesthesia Effects (Surgery Nonspecified)  Goal: Anesthesia/Sedation Recovery  Outcome: Ongoing, Progressing     Problem: Pain (Surgery Nonspecified)  Goal: Acceptable Pain Control  Outcome: Ongoing, Progressing     Problem: Postoperative Nausea and Vomiting (Surgery Nonspecified)  Goal: Nausea and Vomiting Relief  Outcome: Ongoing, Progressing     Problem: Postoperative Urinary Retention (Surgery Nonspecified)  Goal: Effective Urinary  Elimination  Outcome: Ongoing, Progressing     Problem: Respiratory Compromise (Surgery Nonspecified)  Goal: Effective Oxygenation and Ventilation  Outcome: Ongoing, Progressing

## 2023-02-14 NOTE — SUBJECTIVE & OBJECTIVE
Interval History:  Headache has resolved.  He is awaiting surgery today.  Denies any significant abdominal discomfort at this time.  Initial labs this morning with severe hyponatremia which is new; suspected to be factitious and repeated labs.    Review of Systems   Respiratory:  Negative for shortness of breath.    Gastrointestinal:  Negative for abdominal pain.   Neurological:  Negative for headaches.   Objective:     Vital Signs (Most Recent):  Temp: 98.9 °F (37.2 °C) (02/14/23 0741)  Pulse: 61 (02/14/23 0749)  Resp: 18 (02/14/23 0741)  BP: (!) 171/84 (02/14/23 0741)  SpO2: 98 % (02/14/23 0741)   Vital Signs (24h Range):  Temp:  [96.5 °F (35.8 °C)-98.9 °F (37.2 °C)] 98.9 °F (37.2 °C)  Pulse:  [57-77] 61  Resp:  [16-19] 18  SpO2:  [94 %-98 %] 98 %  BP: (126-182)/(79-94) 171/84     Weight: 94.3 kg (207 lb 14.3 oz)  Body mass index is 26.69 kg/m².    Intake/Output Summary (Last 24 hours) at 2/14/2023 1001  Last data filed at 2/13/2023 1828  Gross per 24 hour   Intake 1795.39 ml   Output --   Net 1795.39 ml        Physical Exam  Vitals and nursing note reviewed.   Constitutional:       General: He is not in acute distress.     Appearance: He is well-developed. He is not diaphoretic.      Comments: Elderly male   HENT:      Mouth/Throat:      Mouth: Mucous membranes are moist.   Neck:      Trachea: No tracheal deviation.   Cardiovascular:      Rate and Rhythm: Normal rate and regular rhythm.      Heart sounds: Normal heart sounds. No murmur heard.  Pulmonary:      Effort: Pulmonary effort is normal. No respiratory distress.      Breath sounds: Normal breath sounds. No wheezing.   Abdominal:      General: Bowel sounds are normal. There is no distension.      Palpations: Abdomen is soft.      Tenderness: There is abdominal tenderness.   Musculoskeletal:      Cervical back: Neck supple.      Right lower leg: No edema.      Left lower leg: No edema.   Skin:     General: Skin is warm and dry.      Findings: No rash.    Neurological:      Mental Status: He is alert and oriented to person, place, and time.   Psychiatric:         Behavior: Behavior normal.       Significant Labs: All pertinent labs within the past 24 hours have been reviewed.    Significant Imaging: I have reviewed all pertinent imaging results/findings within the past 24 hours.

## 2023-02-14 NOTE — PLAN OF CARE
Patient not present in room, in OR with general surgery for lap and possible open cholecystectomy. Patient's MRI brain wo contrast is negative for any acute intracranial abnormality, and shows mild age-related cerebral atrophy. ESR elevated to 71, but this is in the setting of cholestatic jaundice and active inflammatory state related to GI issues. Patient without neck pain, temporal tenderness/pain, jaw claudication, or persistent headache/visual changes. Patient's headache and blurry vision to R eye resolved yesterday (after receiving artificial tears), and MRI without evidence of ominous findings to explain new onset headache. Patient reports heightened stress recently, poor PO intake, and has active cholestatic jaundice. Patient does not have headaches regularly, so no need for preventative therapy at this time. Suspect this to be a primary headache with migrainous features. Continue conservative treatment of headaches with prn medications. Will sign off at this time. Please call with any questions regarding this patient's neurologic care or for any neurologic change. Thank you for allowing [facility] to participate in this patient's care.     Waldemar Haynes MD  [facility] PGY III

## 2023-02-14 NOTE — TRANSFER OF CARE
"Anesthesia Transfer of Care Note    Patient: Jarrett Charlton Jr.    Procedure(s) Performed: Procedure(s) (LRB):  CHOLECYSTECTOMY (N/A)    Patient location: PACU    Anesthesia Type: general    Transport from OR: Transported from OR on 6-10 L/min O2 by face mask with adequate spontaneous ventilation    Post pain: adequate analgesia    Post assessment: no apparent anesthetic complications    Post vital signs: stable    Level of consciousness: awake, alert and sedated    Nausea/Vomiting: no nausea/vomiting    Complications: none    Transfer of care protocol was followed      Last vitals:   Visit Vitals  BP (!) 171/84 (Patient Position: Lying)   Pulse 61   Temp 37.2 °C (98.9 °F) (Oral)   Resp 18   Ht 6' 2" (1.88 m)   Wt 94.3 kg (207 lb 14.3 oz)   SpO2 98%   BMI 26.69 kg/m²     "

## 2023-02-14 NOTE — ASSESSMENT & PLAN NOTE
Elevated LFTs, cholestatic pattern -improving overall  Dilated duct seen on right upper quadrant ultrasound/MRCP     S/p ERCP/EUS on 02/10 without obstruction   GI on board.  Per EMS, General surgery evaluation warranted    Generally surgery consult - plan for cholecystectomy which likely has to be open surgery on Tuesday 2/14.  NPO after midnight  IV PPI daily added  Trend CMP  Symptomatic management   Noted to have low-grade fevers initially but afebrile overnight.  Blood cultures showed no growth till date.  Will hold off on antibiotics at this time  - to OR today

## 2023-02-14 NOTE — ASSESSMENT & PLAN NOTE
Patient's FSGs are controlled on current medication regimen.  Last A1c reviewed-   Lab Results   Component Value Date    HGBA1C 7.0 (H) 12/06/2022     Most recent fingerstick glucose reviewed-   Recent Labs   Lab 02/13/23  1210 02/13/23  1747 02/13/23 2001 02/14/23  0608   POCTGLUCOSE 90 136* 103 126*     Current correctional scale  none    Antihyperglycemics (From admission, onward)    Start     Stop Route Frequency Ordered    02/09/23 2158  insulin aspart U-100 pen 0-5 Units         -- SubQ Before meals & nightly PRN 02/09/23 2059        Hold Oral hypoglycemics while patient is in the hospital.

## 2023-02-14 NOTE — PLAN OF CARE
"  Problem: Adult Inpatient Plan of Care  Goal: Plan of Care Review  Outcome: Ongoing, Progressing     Problem: Adult Inpatient Plan of Care  Goal: Optimal Comfort and Wellbeing  Outcome: Ongoing, Progressing     Problem: Diabetes Comorbidity  Goal: Blood Glucose Level Within Targeted Range  Outcome: Ongoing, Progressing     Problem: Hypertension Comorbidity  Goal: Blood Pressure in Desired Range  Outcome: Ongoing, Progressing    .Plan of care reviewed with the patient. Scheduled medicines given and the patient tolerated well. Given Melatonin 6 mg for insomnia, also given Simethicone 80 mg chewable tablet for the Heartburn. Fall and safety precautions taken and the standard interventions are in place. On Telemetry monitoring with NSR, no true "red alarms' noted, no acute distress reported either in the shift. Patient's Accu Check was 103 mg/dl.Patient is observing NPO past midnight. Patient's BP was 182/94 given 5 mg Hydralazine IV on the orders of NP, Ms. Cuello. Advised the patient to call for the assistance. Continued monitoring the patient.      "

## 2023-02-14 NOTE — OP NOTE
PATIENT: Jarrett Charlton Jr.    MRN: [id number]    DATE OF PROCEDURE: 02/14/2023    PREOPERATIVE DIAGNOSIS: Biliary colic    POSTOPERATIVE DIAGNOSIS: Acute on chronic cholecystitits    PROCEDURE: Open cholecystectomy with extensive lysis of adhesions ( 22 modifier )    SURGEON: Mike Joyce M.D.    ASSISTANT: Armaan Grider M.D.    ANESTHESIA: General Endotracheal    ESTIMATED BLOOD LOSS: minimal    SPECIMEN: Gallbladder and contents    COMPLICATIONS: None    INDICATIONS: The patient is 73 with the above diagnosis. R/B/A to cholecystectomy surgery were explained to the patient in detail.  The risks include, but are not limited to: bleeding, infection, re-operation, injury to surrounding structures,reaction to anesthesia, salma-operative cardiopulmonary events including PE/DVT, bile leak, post-op diarrhea, failure to resolve symptoms, and possible death.  The patient stated a clear understanding of the risks and requests the procedure be done.    PROCEDURE IN DETAIL:  After surgical consent was obtained, the patient was transported to the operative theater and onto the operating room table in a supine position.  General endotracheal anesthesia was administered without difficulty.  The patient's abdomen was prepped and draped in a standard sterile fashion around the ileostomy which was covered with sterile gauze and a sterile Tegaderm..  Appropriate perioperative antibiotics were given and a time-out was performed.  A small incision was made 2 fingerbreadths below the right subcostal margin in an attempt to perform a diagnostic laparoscopy.  We carried this down through the subcutaneous tissue and fascia.  We encountered mesh from the prior peristomal hernia repair.  There were dense adhesions and laparoscopy was not possible.  The incision was enlarged.  We used careful and extensive lysis of adhesions to separate the small bowel from the mesh and eventually were able to enter the peritoneal cavity.  There was no  evidence of serosal injury to the small intestine.  The gallbladder was very distended and had acute and chronic inflammation.  Using electrocautery, we made a hole in the dome of the gallbladder and removed thickened bile to facilitate mobilization.  The gallbladder was taken down off the liver in a dome down fashion using cautery.  The peritoneum was scored on both sides of the infundibulum and a critical view of safety was obtained.  The cystic duct and cystic artery were the only structures seen entering the gallbladder.  They were clipped and divided.  The cystic duct was secondarily secured with 3-0 silk suture and tied.  The gallbladder was then removed from the field without difficulty.  The liver bed was found to be free of bleeding or bile leakage.  We copiously irrigated out the operative field with sterile saline.  The fascia was closed in 2 layers using running 1. Vicryl on the deep fascia and interrupted 1. Vicryl on the superficial fascia.  Irrigation was used between each layer.  The subcutaneous tissues were reapproximated using interrupted 3-0 Vicryl suture.  The incision was irrigated out with sterile saline and injected with local anesthetic.  The skin opening was closed with skin staples and dressed in a standard sterile fashion.  At the end of the procedure the instrument, lap, and needle counts were all correct.  The patient was awoken from anesthesia having tolerated the procedure without difficulty and was returned to the PACU in a stable condition.  Patient's family was updated at the end of the procedure and all questions were answered.  Given the extensive adhesions the case was extended greater than 3 times what could have been expected otherwise in such a 22 modifier will be applied.      Mike Joyce M.D., F.A.C.S.  [id number]-Trqjamn and General Surgery  [facility]

## 2023-02-14 NOTE — PROGRESS NOTES
[facility] Medicine  Progress Note    Patient Name: Jarrett Charlton Jr.  MRN: [id number]  Patient Class: IP- Inpatient   Admission Date: 2/9/2023  Length of Stay: 3 days  Attending Physician: Slava Simon, *  Primary Care Provider: Poli Gonzalez MD        Subjective:     Principal Problem:Cholestatic jaundice        HPI:  72-year-old male with PMH of diabetes mellitus, CKD, hypertension, hyperlipidemia, remote history of ulcerative colitis status post total colectomy with diverting ileostomy who presented to outside hospital ER with abdominal pain and found to have cholestatic pattern elevated LFTs with mild dilatation of common bile duct on abdominal ultrasound.  Follow-up MRCP showed mild nonspecific dilatation of CBD without filling defect or intrahepatic biliary ductal dilatation.  No cholelithiasis or evidence of acute cholecystitis.  Patient was transferred to [facility] for GI/surgery evaluation.    During my encounter, patient denies any abdominal pain but reports excessive generalized itching for which she had received Benadryl prior to transfer.  Nausea associated with abdominal pain has now resolved.  Denies any diarrhea, fever.  Denies any chest pain or shortness of breath.  No family at bedside.      Overview/Hospital Course:  No notes on file    Interval History:  Headache has resolved.  He is awaiting surgery today.  Denies any significant abdominal discomfort at this time.  Initial labs this morning with severe hyponatremia which is new; suspected to be factitious and repeated labs.    Review of Systems   Respiratory:  Negative for shortness of breath.    Gastrointestinal:  Negative for abdominal pain.   Neurological:  Negative for headaches.   Objective:     Vital Signs (Most Recent):  Temp: 98.9 °F (37.2 °C) (02/14/23 0741)  Pulse: 61 (02/14/23 0749)  Resp: 18 (02/14/23 0741)  BP: (!) 171/84 (02/14/23 0741)  SpO2: 98 % (02/14/23 0741)   Vital Signs (24h Range):  Temp:   [96.5 °F (35.8 °C)-98.9 °F (37.2 °C)] 98.9 °F (37.2 °C)  Pulse:  [57-77] 61  Resp:  [16-19] 18  SpO2:  [94 %-98 %] 98 %  BP: (126-182)/(79-94) 171/84     Weight: 94.3 kg (207 lb 14.3 oz)  Body mass index is 26.69 kg/m².    Intake/Output Summary (Last 24 hours) at 2/14/2023 1001  Last data filed at 2/13/2023 1828  Gross per 24 hour   Intake 1795.39 ml   Output --   Net 1795.39 ml        Physical Exam  Vitals and nursing note reviewed.   Constitutional:       General: He is not in acute distress.     Appearance: He is well-developed. He is not diaphoretic.      Comments: Elderly male   HENT:      Mouth/Throat:      Mouth: Mucous membranes are moist.   Neck:      Trachea: No tracheal deviation.   Cardiovascular:      Rate and Rhythm: Normal rate and regular rhythm.      Heart sounds: Normal heart sounds. No murmur heard.  Pulmonary:      Effort: Pulmonary effort is normal. No respiratory distress.      Breath sounds: Normal breath sounds. No wheezing.   Abdominal:      General: Bowel sounds are normal. There is no distension.      Palpations: Abdomen is soft.      Tenderness: There is abdominal tenderness.   Musculoskeletal:      Cervical back: Neck supple.      Right lower leg: No edema.      Left lower leg: No edema.   Skin:     General: Skin is warm and dry.      Findings: No rash.   Neurological:      Mental Status: He is alert and oriented to person, place, and time.   Psychiatric:         Behavior: Behavior normal.       Significant Labs: All pertinent labs within the past 24 hours have been reviewed.    Significant Imaging: I have reviewed all pertinent imaging results/findings within the past 24 hours.      Assessment/Plan:      * Cholestatic jaundice  Elevated LFTs, cholestatic pattern -improving overall  Dilated duct seen on right upper quadrant ultrasound/MRCP     S/p ERCP/EUS on 02/10 without obstruction   GI on board.  Per EMS, General surgery evaluation warranted    Generally surgery consult - plan for  cholecystectomy which likely has to be open surgery on Tuesday 2/14.  NPO after midnight  IV PPI daily added  Trend CMP  Symptomatic management   Noted to have low-grade fevers initially but afebrile overnight.  Blood cultures showed no growth till date.  Will hold off on antibiotics at this time  - to OR today      Hyponatremia  - suspect factitious given normal readings until labs today  - repeat CMP      Headache  Patient has reported persistent headaches since admission.  On 02/12 he reported unilateral blurring and lacrimation.  CT head ruled out bleed.      Patient denies any history of migraines but states he may be under stress and has been eating poorly which may be contributing to his headaches.    Neurology on board.  Appreciate recommendations.  - MRI pending      Paroxysmal ventricular tachycardia  Continue amiodarone   Telemetry  Ensure electrolytes are optimized      Prostate cancer  S/p TURP  Appears to be on leuprolide injections per MAR    History of gout  Chronic, stable.  Continue allopurinol      Abnormal LFTs (liver function tests)  As above      CAD (coronary artery disease)  No chest pain.  Troponin negative   Continue aspirin, beta-blocker.  Holding statin due to elevated LFT      CKD (chronic kidney disease), stage IV  Patient follows with Nephrology at [facility].  Per patient he gets normal saline IV twice a week to avoid dehydration secondary to losses from his ostomy.    Renal function currently appears to be at baseline    Started on gentle hydration as anticipated to be NPO after MN  Monitor renal function closely.  Avoid nephrotoxic drugs.  Renal dose medications  Consider Nephrology consult in case of worsening renal function    History of ulcerative colitis  S/p colectomy/ileostomy in the distant past      GERD (gastroesophageal reflux disease)  -continue Protonix      Essential hypertension  Blood pressure consistently elevated to systolic 170s.  At times patient has abdominal  discomfort while blood pressure remains elevated even if the patient is not in any pain.  Unclear if the patient needs additional antihypertensives but given headache with persistently elevated blood pressures, amlodipine 5 mg q.d. added     Holding Coreg due to bradycardia.  Elevated bile salts can contribute to bradycardia in this clinical setting.    PRN hydralazine for SBP>160  -can consider further antihypertensive additions after surgery if patient is still elevated    Type 2 diabetes mellitus with stage 3 chronic kidney disease, without long-term current use of insulin  Patient's FSGs are controlled on current medication regimen.  Last A1c reviewed-   Lab Results   Component Value Date    HGBA1C 7.0 (H) 12/06/2022     Most recent fingerstick glucose reviewed-   Recent Labs   Lab 02/13/23  1210 02/13/23  1747 02/13/23 2001 02/14/23  0608   POCTGLUCOSE 90 136* 103 126*     Current correctional scale  none    Antihyperglycemics (From admission, onward)    Start     Stop Route Frequency Ordered    02/09/23 2158  insulin aspart U-100 pen 0-5 Units         -- SubQ Before meals & nightly PRN 02/09/23 2059        Hold Oral hypoglycemics while patient is in the hospital.      VTE Risk Mitigation (From admission, onward)         Ordered     enoxaparin injection 30 mg  Daily         02/09/23 1138     IP VTE HIGH RISK PATIENT  Once         02/09/23 1138     Place sequential compression device  Until discontinued         02/09/23 1138                Discharge Planning   JOSÉ MIGUEL: 2/15/2023     Code Status: Full Code   Is the patient medically ready for discharge?:     Reason for patient still in hospital (select all that apply): Patient trending condition, Treatment and Consult recommendations  Discharge Plan A: Home with family                  Slava Simon MD  Department of Hospital Medicine   [facility]

## 2023-02-14 NOTE — SUBJECTIVE & OBJECTIVE
Interval History: NAEO. No pain reported this am. Liver studies essentially unchanged. AF. HDS.     Medications:  Continuous Infusions:  Scheduled Meds:   allopurinoL  300 mg Oral Daily    amiodarone  200 mg Oral BID    amLODIPine  5 mg Oral Daily    artificial tears  1 drop Both Eyes BID    aspirin  81 mg Oral Daily    brimonidine 0.2%  1 drop Both Eyes BID    enoxaparin  30 mg Subcutaneous Daily    pantoprazole  40 mg Intravenous Daily     PRN Meds:acetaminophen, aluminum-magnesium hydroxide-simethicone, butalbital-acetaminophen-caffeine -40 mg, dextrose 10%, dextrose 10%, dextrose 10%, dextrose 10%, glucagon (human recombinant), glucagon (human recombinant), glucose, glucose, glucose, glucose, hydrALAZINE, hydrOXYzine HCL, insulin aspart U-100, meclizine, melatonin, naloxone, ondansetron, simethicone, sodium chloride 0.9%     Review of patient's allergies indicates:   Allergen Reactions    Crestor [rosuvastatin]     Lipitor [atorvastatin]      Objective:     Vital Signs (Most Recent):  Temp: 96.5 °F (35.8 °C) (02/14/23 0612)  Pulse: 75 (02/14/23 0612)  Resp: 18 (02/14/23 0612)  BP: (!) 148/87 (02/14/23 0612)  SpO2: (!) 94 % (02/14/23 0612)   Vital Signs (24h Range):  Temp:  [96.5 °F (35.8 °C)-98.4 °F (36.9 °C)] 96.5 °F (35.8 °C)  Pulse:  [57-77] 75  Resp:  [16-19] 18  SpO2:  [94 %-100 %] 94 %  BP: (126-182)/(79-98) 148/87     Weight: 94.3 kg (207 lb 14.3 oz)  Body mass index is 26.69 kg/m².    Intake/Output - Last 3 Shifts         02/12 0700  02/13 0659 02/13 0700  02/14 0659    P.O. 1125     I.V. (mL/kg)  1795.4 (19)    Total Intake(mL/kg) 1125 (11.9) 1795.4 (19)    Urine (mL/kg/hr) 500 (0.2)     Stool 0     Total Output 500     Net +625 +1795.4          Urine Occurrence 5 x     Stool Occurrence 1 x             Physical Exam  Vitals reviewed.   Constitutional:       General: He is not in acute distress.  HENT:      Head: Normocephalic and atraumatic.      Nose: Nose normal.   Eyes:      General:          Right eye: No discharge.         Left eye: No discharge.   Cardiovascular:      Rate and Rhythm: Normal rate and regular rhythm.   Pulmonary:      Effort: Pulmonary effort is normal. No respiratory distress.      Breath sounds: No stridor.   Abdominal:      Comments: Soft, ND  Midline laparotomy  Ileostomy in place    Musculoskeletal:         General: Normal range of motion.      Cervical back: Normal range of motion.   Skin:     General: Skin is warm and dry.      Capillary Refill: Capillary refill takes 2 to 3 seconds.   Neurological:      General: No focal deficit present.      Mental Status: He is alert and oriented to person, place, and time.   Psychiatric:         Mood and Affect: Mood normal.         Behavior: Behavior normal.       Significant Labs:  I have reviewed all pertinent lab results within the past 24 hours.    Significant Diagnostics:  I have reviewed all pertinent imaging results/findings within the past 24 hours.

## 2023-02-14 NOTE — PLAN OF CARE
1115  CM was informed by  Caitlin of a hospfu appt scheduled for the patient with NP Mia Gallagher () on 2/28/2023 at 1030. Information added to the patient's discharge paperwork.    Patient scheduled to have an open chato done today.       Will continue to follow.

## 2023-02-15 PROBLEM — K81.2 ACUTE CHOLECYSTITIS WITH CHRONIC CHOLECYSTITIS: Status: ACTIVE | Noted: 2023-02-09

## 2023-02-15 LAB
ALBUMIN SERPL BCP-MCNC: 2.5 G/DL (ref 3.5–5.2)
ALP SERPL-CCNC: 332 U/L (ref 55–135)
ALT SERPL W/O P-5'-P-CCNC: 55 U/L (ref 10–44)
ANION GAP SERPL CALC-SCNC: 16 MMOL/L (ref 8–16)
AST SERPL-CCNC: 44 U/L (ref 10–40)
BASOPHILS # BLD AUTO: 0.02 K/UL (ref 0–0.2)
BASOPHILS NFR BLD: 0.1 % (ref 0–1.9)
BILIRUB SERPL-MCNC: 1.5 MG/DL (ref 0.1–1)
BUN SERPL-MCNC: 23 MG/DL (ref 8–23)
CALCIUM SERPL-MCNC: 7.3 MG/DL (ref 8.7–10.5)
CHLORIDE SERPL-SCNC: 102 MMOL/L (ref 95–110)
CO2 SERPL-SCNC: 12 MMOL/L (ref 23–29)
CREAT SERPL-MCNC: 2.2 MG/DL (ref 0.5–1.4)
DIFFERENTIAL METHOD: ABNORMAL
EOSINOPHIL # BLD AUTO: 0 K/UL (ref 0–0.5)
EOSINOPHIL NFR BLD: 0 % (ref 0–8)
ERYTHROCYTE [DISTWIDTH] IN BLOOD BY AUTOMATED COUNT: 15 % (ref 11.5–14.5)
EST. GFR  (NO RACE VARIABLE): 31 ML/MIN/1.73 M^2
GLUCOSE SERPL-MCNC: 135 MG/DL (ref 70–110)
HCT VFR BLD AUTO: 34.7 % (ref 40–54)
HGB BLD-MCNC: 11.2 G/DL (ref 14–18)
IMM GRANULOCYTES # BLD AUTO: 0.08 K/UL (ref 0–0.04)
IMM GRANULOCYTES NFR BLD AUTO: 0.6 % (ref 0–0.5)
LYMPHOCYTES # BLD AUTO: 0.9 K/UL (ref 1–4.8)
LYMPHOCYTES NFR BLD: 6.3 % (ref 18–48)
MCH RBC QN AUTO: 28.8 PG (ref 27–31)
MCHC RBC AUTO-ENTMCNC: 32.3 G/DL (ref 32–36)
MCV RBC AUTO: 89 FL (ref 82–98)
MONOCYTES # BLD AUTO: 0.7 K/UL (ref 0.3–1)
MONOCYTES NFR BLD: 5.3 % (ref 4–15)
NEUTROPHILS # BLD AUTO: 11.9 K/UL (ref 1.8–7.7)
NEUTROPHILS NFR BLD: 87.7 % (ref 38–73)
NRBC BLD-RTO: 0 /100 WBC
PLATELET # BLD AUTO: 145 K/UL (ref 150–450)
PMV BLD AUTO: 11.1 FL (ref 9.2–12.9)
POCT GLUCOSE: 125 MG/DL (ref 70–110)
POCT GLUCOSE: 151 MG/DL (ref 70–110)
POCT GLUCOSE: 157 MG/DL (ref 70–110)
POCT GLUCOSE: 162 MG/DL (ref 70–110)
POTASSIUM SERPL-SCNC: 4.4 MMOL/L (ref 3.5–5.1)
PROT SERPL-MCNC: 5.9 G/DL (ref 6–8.4)
RBC # BLD AUTO: 3.89 M/UL (ref 4.6–6.2)
SODIUM SERPL-SCNC: 130 MMOL/L (ref 136–145)
WBC # BLD AUTO: 13.56 K/UL (ref 3.9–12.7)

## 2023-02-15 PROCEDURE — 94761 N-INVAS EAR/PLS OXIMETRY MLT: CPT | Mod: HCNC

## 2023-02-15 PROCEDURE — 85025 COMPLETE CBC W/AUTO DIFF WBC: CPT | Mod: HCNC | Performed by: HOSPITALIST

## 2023-02-15 PROCEDURE — 36415 COLL VENOUS BLD VENIPUNCTURE: CPT | Mod: HCNC | Performed by: HOSPITALIST

## 2023-02-15 PROCEDURE — 25000003 PHARM REV CODE 250: Mod: HCNC | Performed by: STUDENT IN AN ORGANIZED HEALTH CARE EDUCATION/TRAINING PROGRAM

## 2023-02-15 PROCEDURE — 25000003 PHARM REV CODE 250: Mod: HCNC | Performed by: HOSPITALIST

## 2023-02-15 PROCEDURE — 63600175 PHARM REV CODE 636 W HCPCS: Mod: HCNC | Performed by: SURGERY

## 2023-02-15 PROCEDURE — 63600175 PHARM REV CODE 636 W HCPCS: Mod: HCNC | Performed by: HOSPITALIST

## 2023-02-15 PROCEDURE — 25000003 PHARM REV CODE 250: Mod: HCNC | Performed by: SURGERY

## 2023-02-15 PROCEDURE — G0378 HOSPITAL OBSERVATION PER HR: HCPCS | Mod: HCNC

## 2023-02-15 PROCEDURE — 80053 COMPREHEN METABOLIC PANEL: CPT | Mod: HCNC | Performed by: HOSPITALIST

## 2023-02-15 PROCEDURE — 96372 THER/PROPH/DIAG INJ SC/IM: CPT | Performed by: HOSPITALIST

## 2023-02-15 PROCEDURE — 11000001 HC ACUTE MED/SURG PRIVATE ROOM: Mod: HCNC

## 2023-02-15 RX ORDER — SODIUM CHLORIDE 9 MG/ML
INJECTION, SOLUTION INTRAVENOUS CONTINUOUS
Status: DISCONTINUED | OUTPATIENT
Start: 2023-02-15 | End: 2023-02-16

## 2023-02-15 RX ADMIN — Medication 6 MG: at 02:02

## 2023-02-15 RX ADMIN — BRIMONIDINE TARTRATE 1 DROP: 2 SOLUTION OPHTHALMIC at 08:02

## 2023-02-15 RX ADMIN — HYPROMELLOSE 2910 1 DROP: 5 SOLUTION/ DROPS OPHTHALMIC at 09:02

## 2023-02-15 RX ADMIN — SODIUM CHLORIDE: 0.9 INJECTION, SOLUTION INTRAVENOUS at 10:02

## 2023-02-15 RX ADMIN — PANTOPRAZOLE SODIUM 40 MG: 40 TABLET, DELAYED RELEASE ORAL at 08:02

## 2023-02-15 RX ADMIN — OXYCODONE HYDROCHLORIDE AND ACETAMINOPHEN 1 TABLET: 5; 325 TABLET ORAL at 08:02

## 2023-02-15 RX ADMIN — ALLOPURINOL 300 MG: 100 TABLET ORAL at 08:02

## 2023-02-15 RX ADMIN — SIMETHICONE 80 MG: 80 TABLET, CHEWABLE ORAL at 02:02

## 2023-02-15 RX ADMIN — ENOXAPARIN SODIUM 40 MG: 30 INJECTION SUBCUTANEOUS at 05:02

## 2023-02-15 RX ADMIN — HYDROMORPHONE HYDROCHLORIDE 0.5 MG: 1 INJECTION, SOLUTION INTRAMUSCULAR; INTRAVENOUS; SUBCUTANEOUS at 11:02

## 2023-02-15 RX ADMIN — AMLODIPINE BESYLATE 5 MG: 5 TABLET ORAL at 08:02

## 2023-02-15 RX ADMIN — AMPICILLIN SODIUM AND SULBACTAM SODIUM 3 G: 2; 1 INJECTION, POWDER, FOR SOLUTION INTRAMUSCULAR; INTRAVENOUS at 12:02

## 2023-02-15 RX ADMIN — BRIMONIDINE TARTRATE 1 DROP: 2 SOLUTION OPHTHALMIC at 09:02

## 2023-02-15 RX ADMIN — SODIUM CHLORIDE: 0.9 INJECTION, SOLUTION INTRAVENOUS at 12:02

## 2023-02-15 RX ADMIN — AMPICILLIN SODIUM AND SULBACTAM SODIUM 3 G: 2; 1 INJECTION, POWDER, FOR SOLUTION INTRAMUSCULAR; INTRAVENOUS at 09:02

## 2023-02-15 RX ADMIN — AMIODARONE HYDROCHLORIDE 200 MG: 200 TABLET ORAL at 08:02

## 2023-02-15 RX ADMIN — AMIODARONE HYDROCHLORIDE 200 MG: 200 TABLET ORAL at 09:02

## 2023-02-15 RX ADMIN — HYPROMELLOSE 2910 1 DROP: 5 SOLUTION/ DROPS OPHTHALMIC at 08:02

## 2023-02-15 RX ADMIN — HYDROMORPHONE HYDROCHLORIDE 0.5 MG: 1 INJECTION, SOLUTION INTRAMUSCULAR; INTRAVENOUS; SUBCUTANEOUS at 05:02

## 2023-02-15 RX ADMIN — ASPIRIN 81 MG: 81 TABLET, COATED ORAL at 08:02

## 2023-02-15 NOTE — ASSESSMENT & PLAN NOTE
Blood pressure consistently elevated to systolic 170s.  At times patient has abdominal discomfort while blood pressure remains elevated even if the patient is not in any pain.  Unclear if the patient needs additional antihypertensives but given headache with persistently elevated blood pressures, amlodipine 5 mg q.d. added     Holding Coreg due to bradycardia.  Elevated bile salts can contribute to bradycardia in this clinical setting.    PRN hydralazine for SBP>160  -can consider further antihypertensive additions after surgery if patient is still elevated, appears to be stable today

## 2023-02-15 NOTE — PLAN OF CARE
Problem: Adult Inpatient Plan of Care  Goal: Plan of Care Review  Outcome: Ongoing, Progressing     Problem: Diabetes Comorbidity  Goal: Blood Glucose Level Within Targeted Range  Outcome: Ongoing, Progressing     Problem: Fluid and Electrolyte Imbalance (Acute Kidney Injury/Impairment)  Goal: Fluid and Electrolyte Balance  Outcome: Ongoing, Progressing     Problem: Oral Intake Inadequate (Acute Kidney Injury/Impairment)  Goal: Optimal Nutrition Intake  Outcome: Ongoing, Progressing     Problem: Renal Function Impairment (Acute Kidney Injury/Impairment)  Goal: Effective Renal Function  Outcome: Ongoing, Progressing     Problem: Hypertension Comorbidity  Goal: Blood Pressure in Desired Range  Outcome: Ongoing, Progressing     Problem: Fall Injury Risk  Goal: Absence of Fall and Fall-Related Injury  Outcome: Ongoing, Progressing     Problem: Bleeding (Surgery Nonspecified)  Goal: Absence of Bleeding  Outcome: Ongoing, Progressing     Problem: Bowel Motility Impaired (Surgery Nonspecified)  Goal: Effective Bowel Elimination  Outcome: Ongoing, Progressing     Problem: Fluid and Electrolyte Imbalance (Surgery Nonspecified)  Goal: Fluid and Electrolyte Balance  Outcome: Ongoing, Progressing     Problem: Glycemic Control Impaired (Surgery Nonspecified)  Goal: Blood Glucose Level Within Targeted Range  Outcome: Ongoing, Progressing     Problem: Infection (Surgery Nonspecified)  Goal: Absence of Infection Signs and Symptoms  Outcome: Ongoing, Progressing     Problem: Ongoing Anesthesia Effects (Surgery Nonspecified)  Goal: Anesthesia/Sedation Recovery  Outcome: Ongoing, Progressing     Problem: Pain (Surgery Nonspecified)  Goal: Acceptable Pain Control  Outcome: Ongoing, Progressing     Problem: Postoperative Nausea and Vomiting (Surgery Nonspecified)  Goal: Nausea and Vomiting Relief  Outcome: Ongoing, Progressing     Problem: Postoperative Urinary Retention (Surgery Nonspecified)  Goal: Effective Urinary  Elimination  Outcome: Ongoing, Progressing     Problem: Respiratory Compromise (Surgery Nonspecified)  Goal: Effective Oxygenation and Ventilation  Outcome: Ongoing, Progressing     Problem: Bleeding (Cholecystectomy)  Goal: Absence of Bleeding  Outcome: Ongoing, Progressing     Problem: Bowel Motility Impaired (Cholecystectomy)  Goal: Effective Bowel Elimination  Outcome: Ongoing, Progressing     Problem: Fluid and Electrolyte Imbalance (Cholecystectomy)  Goal: Fluid and Electrolyte Balance  Outcome: Ongoing, Progressing     Problem: Infection (Cholecystectomy)  Goal: Absence of Infection Signs and Symptoms  Outcome: Ongoing, Progressing     Problem: Ongoing Anesthesia Effects (Cholecystectomy)  Goal: Anesthesia/Sedation Recovery  Outcome: Ongoing, Progressing     Problem: Pain (Cholecystectomy)  Goal: Acceptable Pain Control  Outcome: Ongoing, Progressing     Problem: Postoperative Nausea and Vomiting (Cholecystectomy)  Goal: Nausea and Vomiting Relief  Outcome: Ongoing, Progressing     Problem: Postoperative Urinary Retention (Cholecystectomy)  Goal: Effective Urinary Elimination  Outcome: Ongoing, Progressing     Problem: Respiratory Compromise (Cholecystectomy)  Goal: Effective Oxygenation and Ventilation  Outcome: Ongoing, Progressing

## 2023-02-15 NOTE — PLAN OF CARE
GI follow-up scheduled. Dr. Joyce follow-up requested from access navigators.     -- went to meet with patient. Nursing staff at bedside, will follow-up at a later time.    Patient Contacts    Name Relation Home Work Latisha Strange Daughter [phone]       Future Appointments   Date Time Provider Department Center   2/20/2023  8:30 AM INFUSION, CHAIR 6 [facility]   2/24/2023  9:30 AM INFUSION, CHAIR 4 [facility]   2/27/2023  8:30 AM INFUSION, CHAIR 4 [facility]   2/28/2023 10:30 AM Mia Gallagher, NICKY [facility]   3/2/2023 10:40 AM Aj Marrufo MD [facility] CARDIO [facility]   3/3/2023  8:00 AM INFUSION, CHAIR 1 [facility]   3/6/2023  8:00 AM INFUSION, CHAIR 9 [facility]   3/10/2023  8:00 AM Bianka Arevalo NP [facility] NEPHRO [facility]   3/10/2023  8:30 AM INFUSION, CHAIR 8 [facility]   3/13/2023  8:00 AM INFUSION, CHAIR 8 [facility]   3/17/2023  8:30 AM INFUSION, CHAIR 8 [facility]   3/20/2023  8:00 AM INFUSION, CHAIR 8 [facility]   3/22/2023  7:45 AM [facility]   3/24/2023  8:00 AM INFUSION, CHAIR 8 [facility]   3/27/2023  8:00 AM INFUSION, CHAIR 8 [facility]   3/31/2023  8:30 AM INFUSION, CHAIR 8 [facility]   4/3/2023  8:00 AM INFUSION, CHAIR 9 [facility]   4/6/2023  8:30 AM INFUSION, CHAIR 7 [facility]   4/10/2023  8:00 AM INFUSION, CHAIR 8 [facility]   4/14/2023  8:00 AM INFUSION, CHAIR 9 [facility]   4/17/2023  8:00 AM INFUSION, CHAIR 8 [facility]   4/21/2023  8:00 AM INFUSION, CHAIR 9 [facility]   4/24/2023  8:00 AM INFUSION, CHAIR 8 [facility]   4/28/2023  8:00 AM INFUSION, CHAIR 9 [facility]       02/15/23 1238   Discharge  Reassessment   Assessment Type Discharge Planning Reassessment   Did the patient's condition or plan change since previous assessment? No   Discharge Plan A Home   Discharge Plan B Home with family   DME Needed Upon Discharge  none   Discharge Barriers Identified None   Why the patient remains in the hospital Requires continued medical care   Post-Acute Status   Hospital Resources/Appts/Education Provided Appointments scheduled by Navigator/Coordinator   Discharge Delays None known at this time     Sara Naranjo RN    [phone]

## 2023-02-15 NOTE — ASSESSMENT & PLAN NOTE
Patient follows with Nephrology at [facility].  Per patient he gets normal saline IV twice a week to avoid dehydration secondary to losses from his ostomy.    Renal function currently appears to be at baseline    Started on gentle hydration as anticipated to be NPO after MN  Monitor renal function closely.  Avoid nephrotoxic drugs.  Renal dose medications  Consider Nephrology consult in case of worsening renal function

## 2023-02-15 NOTE — ASSESSMENT & PLAN NOTE
Elevated LFTs, cholestatic pattern -improving overall  Dilated duct seen on right upper quadrant ultrasound/MRCP     S/p ERCP/EUS on 02/10 without obstruction   GI on board.  Per EMS, General surgery evaluation warranted    Generally surgery consult - plan for cholecystectomy which likely has to be open surgery on Tuesday 2/14.  NPO after midnight  IV PPI daily added  Trend CMP  Symptomatic management   Noted to have low-grade fevers initially but afebrile overnight.  Blood cultures showed no growth till date.  Will hold off on antibiotics at this time  - status post cholecystectomy on 2/14  -IV antibiotics as per surgery recs; postop pain control

## 2023-02-15 NOTE — ASSESSMENT & PLAN NOTE
Patient's FSGs are controlled on current medication regimen.  Last A1c reviewed-   Lab Results   Component Value Date    HGBA1C 7.0 (H) 12/06/2022     Most recent fingerstick glucose reviewed-   Recent Labs   Lab 02/14/23  1622 02/14/23  1940 02/15/23  0507 02/15/23  1155   POCTGLUCOSE 174* 165* 162* 151*     Current correctional scale  none    Antihyperglycemics (From admission, onward)    Start     Stop Route Frequency Ordered    02/09/23 2158  insulin aspart U-100 pen 0-5 Units         -- SubQ Before meals & nightly PRN 02/09/23 2059        Hold Oral hypoglycemics while patient is in the hospital.

## 2023-02-15 NOTE — SUBJECTIVE & OBJECTIVE
Interval History:  Significant abdominal discomfort today.  Relieved with IV pain medication.  Able to tolerate diet.  Monitoring overnight given severe pain and to continue IV antibiotics as per surgery recs.    Review of Systems   Respiratory:  Negative for shortness of breath.    Gastrointestinal:  Negative for abdominal pain.   Neurological:  Negative for headaches.   Objective:     Vital Signs (Most Recent):  Temp: 97.7 °F (36.5 °C) (02/15/23 1152)  Pulse: 83 (02/15/23 1152)  Resp: 18 (02/15/23 1152)  BP: (!) 172/81 (02/15/23 1152)  SpO2: 97 % (02/15/23 1152)   Vital Signs (24h Range):  Temp:  [96.2 °F (35.7 °C)-97.7 °F (36.5 °C)] 97.7 °F (36.5 °C)  Pulse:  [69-83] 83  Resp:  [16-20] 18  SpO2:  [96 %-97 %] 97 %  BP: (125-186)/(71-92) 172/81     Weight: 94.3 kg (207 lb 14.3 oz)  Body mass index is 26.69 kg/m².    Intake/Output Summary (Last 24 hours) at 2/15/2023 1555  Last data filed at 2/14/2023 2137  Gross per 24 hour   Intake --   Output 900 ml   Net -900 ml        Physical Exam  Vitals and nursing note reviewed.   Constitutional:       General: He is not in acute distress.     Appearance: He is well-developed. He is not diaphoretic.      Comments: Elderly male   HENT:      Mouth/Throat:      Mouth: Mucous membranes are moist.   Neck:      Trachea: No tracheal deviation.   Cardiovascular:      Rate and Rhythm: Normal rate and regular rhythm.      Heart sounds: Normal heart sounds. No murmur heard.  Pulmonary:      Effort: Pulmonary effort is normal. No respiratory distress.      Breath sounds: Normal breath sounds. No wheezing.   Abdominal:      General: Bowel sounds are normal. There is no distension.      Palpations: Abdomen is soft.      Tenderness: There is abdominal tenderness.   Musculoskeletal:      Cervical back: Neck supple.      Right lower leg: No edema.      Left lower leg: No edema.   Skin:     General: Skin is warm and dry.      Findings: No rash.   Neurological:      Mental Status: He is alert  and oriented to person, place, and time.   Psychiatric:         Behavior: Behavior normal.       Significant Labs: All pertinent labs within the past 24 hours have been reviewed.    Significant Imaging: I have reviewed all pertinent imaging results/findings within the past 24 hours.

## 2023-02-15 NOTE — PROGRESS NOTES
2/27 pt was discharged on 2/16  back to hospital on 2/24 with wound dehiscence after gall bladder removal  bp 226/102  Pt eval by surgery and sutures placed and pt was dc    Received referral on 2/9  pt in hospital and need for case management follow up identified by REFUGIO Ferro    Pt in hospital currently surgery for cholecystectomy on 2/14

## 2023-02-15 NOTE — PROGRESS NOTES
[facility] Medicine  Progress Note    Patient Name: Jarrett Charlton Jr.  MRN: [id number]  Patient Class: IP- Inpatient   Admission Date: 2/9/2023  Length of Stay: 4 days  Attending Physician: Slava Simon, *  Primary Care Provider: Poli Gonzalez MD        Subjective:     Principal Problem:Acute cholecystitis with chronic cholecystitis        HPI:  72-year-old male with PMH of diabetes mellitus, CKD, hypertension, hyperlipidemia, remote history of ulcerative colitis status post total colectomy with diverting ileostomy who presented to outside hospital ER with abdominal pain and found to have cholestatic pattern elevated LFTs with mild dilatation of common bile duct on abdominal ultrasound.  Follow-up MRCP showed mild nonspecific dilatation of CBD without filling defect or intrahepatic biliary ductal dilatation.  No cholelithiasis or evidence of acute cholecystitis.  Patient was transferred to [facility] for GI/surgery evaluation.    During my encounter, patient denies any abdominal pain but reports excessive generalized itching for which she had received Benadryl prior to transfer.  Nausea associated with abdominal pain has now resolved.  Denies any diarrhea, fever.  Denies any chest pain or shortness of breath.  No family at bedside.      Overview/Hospital Course:  No notes on file    Interval History:  Significant abdominal discomfort today.  Relieved with IV pain medication.  Able to tolerate diet.  Monitoring overnight given severe pain and to continue IV antibiotics as per surgery recs.    Review of Systems   Respiratory:  Negative for shortness of breath.    Gastrointestinal:  Negative for abdominal pain.   Neurological:  Negative for headaches.   Objective:     Vital Signs (Most Recent):  Temp: 97.7 °F (36.5 °C) (02/15/23 1152)  Pulse: 83 (02/15/23 1152)  Resp: 18 (02/15/23 1152)  BP: (!) 172/81 (02/15/23 1152)  SpO2: 97 % (02/15/23 1152)   Vital Signs (24h Range):  Temp:  [96.2 °F  (35.7 °C)-97.7 °F (36.5 °C)] 97.7 °F (36.5 °C)  Pulse:  [69-83] 83  Resp:  [16-20] 18  SpO2:  [96 %-97 %] 97 %  BP: (125-186)/(71-92) 172/81     Weight: 94.3 kg (207 lb 14.3 oz)  Body mass index is 26.69 kg/m².    Intake/Output Summary (Last 24 hours) at 2/15/2023 1555  Last data filed at 2/14/2023 2137  Gross per 24 hour   Intake --   Output 900 ml   Net -900 ml        Physical Exam  Vitals and nursing note reviewed.   Constitutional:       General: He is not in acute distress.     Appearance: He is well-developed. He is not diaphoretic.      Comments: Elderly male   HENT:      Mouth/Throat:      Mouth: Mucous membranes are moist.   Neck:      Trachea: No tracheal deviation.   Cardiovascular:      Rate and Rhythm: Normal rate and regular rhythm.      Heart sounds: Normal heart sounds. No murmur heard.  Pulmonary:      Effort: Pulmonary effort is normal. No respiratory distress.      Breath sounds: Normal breath sounds. No wheezing.   Abdominal:      General: Bowel sounds are normal. There is no distension.      Palpations: Abdomen is soft.      Tenderness: There is abdominal tenderness.   Musculoskeletal:      Cervical back: Neck supple.      Right lower leg: No edema.      Left lower leg: No edema.   Skin:     General: Skin is warm and dry.      Findings: No rash.   Neurological:      Mental Status: He is alert and oriented to person, place, and time.   Psychiatric:         Behavior: Behavior normal.       Significant Labs: All pertinent labs within the past 24 hours have been reviewed.    Significant Imaging: I have reviewed all pertinent imaging results/findings within the past 24 hours.      Assessment/Plan:      * Acute cholecystitis with chronic cholecystitis  Elevated LFTs, cholestatic pattern -improving overall  Dilated duct seen on right upper quadrant ultrasound/MRCP     S/p ERCP/EUS on 02/10 without obstruction   GI on board.  Per EMS, General surgery evaluation warranted    Generally surgery consult -  plan for cholecystectomy which likely has to be open surgery on Tuesday 2/14.  NPO after midnight  IV PPI daily added  Trend CMP  Symptomatic management   Noted to have low-grade fevers initially but afebrile overnight.  Blood cultures showed no growth till date.  Will hold off on antibiotics at this time  - status post cholecystectomy on 2/14  -IV antibiotics as per surgery recs; postop pain control      Hyponatremia  - suspect factitious given normal readings until labs today  - repeat CMP      Headache  Patient has reported persistent headaches since admission.  On 02/12 he reported unilateral blurring and lacrimation.  CT head ruled out bleed.      Patient denies any history of migraines but states he may be under stress and has been eating poorly which may be contributing to his headaches.    Neurology on board.  Appreciate recommendations.  - MRI unremarkable      Paroxysmal ventricular tachycardia  Continue amiodarone   Telemetry  Ensure electrolytes are optimized      Prostate cancer  S/p TURP  Appears to be on leuprolide injections per MAR    History of gout  Chronic, stable.  Continue allopurinol      Abnormal LFTs (liver function tests)  As above      CAD (coronary artery disease)  No chest pain.  Troponin negative   Continue aspirin, beta-blocker.  Holding statin due to elevated LFT      CKD (chronic kidney disease), stage IV  Patient follows with Nephrology at [facility].  Per patient he gets normal saline IV twice a week to avoid dehydration secondary to losses from his ostomy.    Renal function currently appears to be at baseline    Started on gentle hydration as anticipated to be NPO after MN  Monitor renal function closely.  Avoid nephrotoxic drugs.  Renal dose medications  Consider Nephrology consult in case of worsening renal function    History of ulcerative colitis  S/p colectomy/ileostomy in the distant past      GERD (gastroesophageal reflux disease)  -continue Protonix      Essential  hypertension  Blood pressure consistently elevated to systolic 170s.  At times patient has abdominal discomfort while blood pressure remains elevated even if the patient is not in any pain.  Unclear if the patient needs additional antihypertensives but given headache with persistently elevated blood pressures, amlodipine 5 mg q.d. added     Holding Coreg due to bradycardia.  Elevated bile salts can contribute to bradycardia in this clinical setting.    PRN hydralazine for SBP>160  -can consider further antihypertensive additions after surgery if patient is still elevated, appears to be stable today    Type 2 diabetes mellitus with stage 3 chronic kidney disease, without long-term current use of insulin  Patient's FSGs are controlled on current medication regimen.  Last A1c reviewed-   Lab Results   Component Value Date    HGBA1C 7.0 (H) 12/06/2022     Most recent fingerstick glucose reviewed-   Recent Labs   Lab 02/14/23  1622 02/14/23  1940 02/15/23  0507 02/15/23  1155   POCTGLUCOSE 174* 165* 162* 151*     Current correctional scale  none    Antihyperglycemics (From admission, onward)    Start     Stop Route Frequency Ordered    02/09/23 2158  insulin aspart U-100 pen 0-5 Units         -- SubQ Before meals & nightly PRN 02/09/23 2059        Hold Oral hypoglycemics while patient is in the hospital.      VTE Risk Mitigation (From admission, onward)         Ordered     enoxaparin injection 40 mg  Daily         02/14/23 1314     IP VTE HIGH RISK PATIENT  Once         02/09/23 1138     Place sequential compression device  Until discontinued         02/09/23 1138                Discharge Planning   JOSÉ MIGUEL: 2/15/2023     Code Status: Full Code   Is the patient medically ready for discharge?:     Reason for patient still in hospital (select all that apply): Patient trending condition and Treatment  Discharge Plan A: Home   Discharge Delays: None known at this time              Slava Simon MD  Department of  [facility]

## 2023-02-15 NOTE — ANESTHESIA POSTPROCEDURE EVALUATION
Anesthesia Post Evaluation    Patient: Jarrett Charlton Jr.    Procedure(s) Performed: Procedure(s) (LRB):  CHOLECYSTECTOMY (N/A)  LYSIS, ADHESIONS (N/A)    Final Anesthesia Type: general      Patient location during evaluation: PACU  Patient participation: Yes- Able to Participate  Level of consciousness: awake and alert  Post-procedure vital signs: reviewed and stable  Pain management: adequate  Airway patency: patent    PONV status at discharge: No PONV  Anesthetic complications: no      Cardiovascular status: blood pressure returned to baseline  Respiratory status: unassisted  Hydration status: euvolemic  Follow-up not needed.          Vitals Value Taken Time   /75 02/15/23 0837   Temp 36.2 °C (97.2 °F) 02/15/23 0837   Pulse 79 02/15/23 0837   Resp 20 02/15/23 0837   SpO2 96 % 02/15/23 0837         Event Time   Out of Recovery 02/14/2023 14:43:33         Pain/Jamie Score: Pain Rating Prior to Med Admin: 8 (2/15/2023  8:47 AM)  Pain Rating Post Med Admin: 4 (2/14/2023 10:30 PM)  Jamie Score: 10 (2/14/2023  2:39 PM)

## 2023-02-15 NOTE — PROGRESS NOTES
Progress Note  General Surgery    Admit Date: 2/9/2023  S/P: Procedure(s) (LRB):  CHOLECYSTECTOMY (N/A)  LYSIS, ADHESIONS (N/A)    Post-operative Day: 1 Day Post-Op    Hospital Day: 7    SUBJECTIVE:     No acute events overnight. Patient states having pain now, tolerated some PO  OBJECTIVE:     Vital Signs (Most Recent)  Temp: 97.2 °F (36.2 °C) (02/15/23 0837)  Pulse: 79 (02/15/23 0837)  Resp: 20 (02/15/23 0837)  BP: (!) 140/75 (02/15/23 0837)  SpO2: 96 % (02/15/23 0837)    Vital Signs Range (Last 24H):  Temp:  [96.2 °F (35.7 °C)-97.6 °F (36.4 °C)]   Pulse:  [60-81]   Resp:  [11-21]   BP: (125-186)/(67-92)   SpO2:  [96 %-100 %]     I & O (Last 24H):  Intake/Output Summary (Last 24 hours) at 2/15/2023 1112  Last data filed at 2/14/2023 2137  Gross per 24 hour   Intake 1050 ml   Output 900 ml   Net 150 ml       Physical Exam:  Gen: NAD   HEENT: NCAT  Pulm: unlabored, symmetrical   Abd: Soft, nttp. No rebound, guarding.     Wound/Incision:  clean, dry, intact    Laboratory:  Labs within the past 24 hours have been reviewed.    ASSESSMENT/PLAN:     Assessment/Plan:  Postop orders were not released  Pt needs IVFs and antibiotics      Mike Joyce M.D., F.A.C.S.  [id number] and General Surgery  [facility]

## 2023-02-15 NOTE — ASSESSMENT & PLAN NOTE
Patient has reported persistent headaches since admission.  On 02/12 he reported unilateral blurring and lacrimation.  CT head ruled out bleed.      Patient denies any history of migraines but states he may be under stress and has been eating poorly which may be contributing to his headaches.    Neurology on board.  Appreciate recommendations.  - MRI unremarkable

## 2023-02-15 NOTE — PLAN OF CARE
Problem: Adult Inpatient Plan of Care  Goal: Plan of Care Review  Outcome: Ongoing, Progressing     Problem: Adult Inpatient Plan of Care  Goal: Optimal Comfort and Wellbeing  Outcome: Ongoing, Progressing     Problem: Diabetes Comorbidity  Goal: Blood Glucose Level Within Targeted Range  Outcome: Ongoing, Progressing     Problem: Bleeding (Surgery Nonspecified)  Goal: Absence of Bleeding  Outcome: Ongoing, Progressing     Problem: Pain (Surgery Nonspecified)  Goal: Acceptable Pain Control  Outcome: Ongoing, Progressing    .Plan of care reviewed with the patient. Scheduled medicines given and the patient tolerated well.  Given two doses of Simethicone 80 mg chewable tablet for acid reflux. Also given Hydromorphone 0.5 mg IV for surgical pain 10/10. Melatonin 6 mg was given for Insomnia. Fall and safety precautions taken and the standard interventions are in place. Patient voided and walked to the bathroom. Patient's Accu Check was 165 mg/dl. Advised the patient to call for the assistance. Continued monitoring the patient.

## 2023-02-16 VITALS
SYSTOLIC BLOOD PRESSURE: 130 MMHG | OXYGEN SATURATION: 95 % | HEART RATE: 94 BPM | DIASTOLIC BLOOD PRESSURE: 81 MMHG | RESPIRATION RATE: 18 BRPM | TEMPERATURE: 99 F | BODY MASS INDEX: 26.68 KG/M2 | WEIGHT: 207.88 LBS | HEIGHT: 74 IN

## 2023-02-16 LAB
ALBUMIN SERPL BCP-MCNC: 2.8 G/DL (ref 3.5–5.2)
ALP SERPL-CCNC: 281 U/L (ref 55–135)
ALT SERPL W/O P-5'-P-CCNC: 39 U/L (ref 10–44)
ANION GAP SERPL CALC-SCNC: 12 MMOL/L (ref 8–16)
AST SERPL-CCNC: 29 U/L (ref 10–40)
BACTERIA BLD CULT: NORMAL
BASOPHILS # BLD AUTO: 0.02 K/UL (ref 0–0.2)
BASOPHILS NFR BLD: 0.2 % (ref 0–1.9)
BILIRUB SERPL-MCNC: 1.5 MG/DL (ref 0.1–1)
BUN SERPL-MCNC: 28 MG/DL (ref 8–23)
CALCIUM SERPL-MCNC: 8.5 MG/DL (ref 8.7–10.5)
CHLORIDE SERPL-SCNC: 108 MMOL/L (ref 95–110)
CO2 SERPL-SCNC: 20 MMOL/L (ref 23–29)
CREAT SERPL-MCNC: 2.2 MG/DL (ref 0.5–1.4)
DIFFERENTIAL METHOD: ABNORMAL
EOSINOPHIL # BLD AUTO: 0 K/UL (ref 0–0.5)
EOSINOPHIL NFR BLD: 0.4 % (ref 0–8)
ERYTHROCYTE [DISTWIDTH] IN BLOOD BY AUTOMATED COUNT: 15.2 % (ref 11.5–14.5)
EST. GFR  (NO RACE VARIABLE): 31 ML/MIN/1.73 M^2
GLUCOSE SERPL-MCNC: 134 MG/DL (ref 70–110)
HCT VFR BLD AUTO: 33.9 % (ref 40–54)
HGB BLD-MCNC: 10.9 G/DL (ref 14–18)
IMM GRANULOCYTES # BLD AUTO: 0.07 K/UL (ref 0–0.04)
IMM GRANULOCYTES NFR BLD AUTO: 0.7 % (ref 0–0.5)
LYMPHOCYTES # BLD AUTO: 1.3 K/UL (ref 1–4.8)
LYMPHOCYTES NFR BLD: 13.7 % (ref 18–48)
MCH RBC QN AUTO: 29.1 PG (ref 27–31)
MCHC RBC AUTO-ENTMCNC: 32.2 G/DL (ref 32–36)
MCV RBC AUTO: 90 FL (ref 82–98)
MONOCYTES # BLD AUTO: 0.6 K/UL (ref 0.3–1)
MONOCYTES NFR BLD: 5.8 % (ref 4–15)
NEUTROPHILS # BLD AUTO: 7.6 K/UL (ref 1.8–7.7)
NEUTROPHILS NFR BLD: 79.2 % (ref 38–73)
NRBC BLD-RTO: 0 /100 WBC
PLATELET # BLD AUTO: 204 K/UL (ref 150–450)
PMV BLD AUTO: 10.9 FL (ref 9.2–12.9)
POCT GLUCOSE: 139 MG/DL (ref 70–110)
POTASSIUM SERPL-SCNC: 4 MMOL/L (ref 3.5–5.1)
PROT SERPL-MCNC: 6.4 G/DL (ref 6–8.4)
RBC # BLD AUTO: 3.75 M/UL (ref 4.6–6.2)
SODIUM SERPL-SCNC: 140 MMOL/L (ref 136–145)
WBC # BLD AUTO: 9.56 K/UL (ref 3.9–12.7)

## 2023-02-16 PROCEDURE — G0378 HOSPITAL OBSERVATION PER HR: HCPCS | Mod: HCNC

## 2023-02-16 PROCEDURE — 94761 N-INVAS EAR/PLS OXIMETRY MLT: CPT | Mod: HCNC

## 2023-02-16 PROCEDURE — 63600175 PHARM REV CODE 636 W HCPCS: Mod: HCNC | Performed by: SURGERY

## 2023-02-16 PROCEDURE — 36415 COLL VENOUS BLD VENIPUNCTURE: CPT | Mod: HCNC | Performed by: HOSPITALIST

## 2023-02-16 PROCEDURE — 25000003 PHARM REV CODE 250: Mod: HCNC | Performed by: STUDENT IN AN ORGANIZED HEALTH CARE EDUCATION/TRAINING PROGRAM

## 2023-02-16 PROCEDURE — 80053 COMPREHEN METABOLIC PANEL: CPT | Mod: HCNC | Performed by: HOSPITALIST

## 2023-02-16 PROCEDURE — 85025 COMPLETE CBC W/AUTO DIFF WBC: CPT | Mod: HCNC | Performed by: HOSPITALIST

## 2023-02-16 PROCEDURE — 63600175 PHARM REV CODE 636 W HCPCS: Mod: HCNC | Performed by: HOSPITALIST

## 2023-02-16 PROCEDURE — 25000003 PHARM REV CODE 250: Mod: HCNC | Performed by: HOSPITALIST

## 2023-02-16 PROCEDURE — 25000003 PHARM REV CODE 250: Mod: HCNC | Performed by: SURGERY

## 2023-02-16 PROCEDURE — 97161 PT EVAL LOW COMPLEX 20 MIN: CPT | Mod: HCNC

## 2023-02-16 RX ORDER — POLYETHYLENE GLYCOL 3350 17 G/17G
17 POWDER, FOR SOLUTION ORAL 2 TIMES DAILY PRN
Qty: 510 G | Refills: 0 | Status: SHIPPED | OUTPATIENT
Start: 2023-02-16 | End: 2023-03-13

## 2023-02-16 RX ORDER — HYDROMORPHONE HYDROCHLORIDE 1 MG/ML
0.5 INJECTION, SOLUTION INTRAMUSCULAR; INTRAVENOUS; SUBCUTANEOUS EVERY 6 HOURS PRN
Status: DISCONTINUED | OUTPATIENT
Start: 2023-02-16 | End: 2023-02-16 | Stop reason: HOSPADM

## 2023-02-16 RX ORDER — OXYCODONE HYDROCHLORIDE 5 MG/1
5 TABLET ORAL EVERY 4 HOURS PRN
Status: DISCONTINUED | OUTPATIENT
Start: 2023-02-16 | End: 2023-02-16 | Stop reason: HOSPADM

## 2023-02-16 RX ORDER — ACETAMINOPHEN 500 MG
1000 TABLET ORAL 3 TIMES DAILY
Status: DISCONTINUED | OUTPATIENT
Start: 2023-02-16 | End: 2023-02-16 | Stop reason: HOSPADM

## 2023-02-16 RX ORDER — OXYCODONE HYDROCHLORIDE 5 MG/1
10 TABLET ORAL EVERY 4 HOURS PRN
Status: DISCONTINUED | OUTPATIENT
Start: 2023-02-16 | End: 2023-02-16 | Stop reason: HOSPADM

## 2023-02-16 RX ORDER — OXYCODONE HYDROCHLORIDE 5 MG/1
5 TABLET ORAL EVERY 4 HOURS PRN
Qty: 12 TABLET | Refills: 0 | Status: SHIPPED | OUTPATIENT
Start: 2023-02-16 | End: 2023-03-13

## 2023-02-16 RX ADMIN — AMPICILLIN SODIUM AND SULBACTAM SODIUM 3 G: 2; 1 INJECTION, POWDER, FOR SOLUTION INTRAMUSCULAR; INTRAVENOUS at 03:02

## 2023-02-16 RX ADMIN — OXYCODONE HYDROCHLORIDE 10 MG: 5 TABLET ORAL at 01:02

## 2023-02-16 RX ADMIN — ASPIRIN 81 MG: 81 TABLET, COATED ORAL at 08:02

## 2023-02-16 RX ADMIN — HYPROMELLOSE 2910 1 DROP: 5 SOLUTION/ DROPS OPHTHALMIC at 08:02

## 2023-02-16 RX ADMIN — ACETAMINOPHEN 1000 MG: 500 TABLET ORAL at 08:02

## 2023-02-16 RX ADMIN — AMIODARONE HYDROCHLORIDE 200 MG: 200 TABLET ORAL at 08:02

## 2023-02-16 RX ADMIN — SODIUM CHLORIDE: 0.9 INJECTION, SOLUTION INTRAVENOUS at 08:02

## 2023-02-16 RX ADMIN — PANTOPRAZOLE SODIUM 40 MG: 40 TABLET, DELAYED RELEASE ORAL at 08:02

## 2023-02-16 RX ADMIN — HYDROMORPHONE HYDROCHLORIDE 0.5 MG: 1 INJECTION, SOLUTION INTRAMUSCULAR; INTRAVENOUS; SUBCUTANEOUS at 03:02

## 2023-02-16 RX ADMIN — BRIMONIDINE TARTRATE 1 DROP: 2 SOLUTION OPHTHALMIC at 08:02

## 2023-02-16 RX ADMIN — OXYCODONE HYDROCHLORIDE 10 MG: 5 TABLET ORAL at 08:02

## 2023-02-16 RX ADMIN — AMLODIPINE BESYLATE 5 MG: 5 TABLET ORAL at 08:02

## 2023-02-16 RX ADMIN — ALLOPURINOL 300 MG: 100 TABLET ORAL at 08:02

## 2023-02-16 NOTE — PLAN OF CARE
Introduced as VN and will be reviewing discharge instructions.  Educated patient on reason for admission, home medication list, and discharge instructions including when to return to ED and the following doctor appointments.  Education per flowsheet.  Opportunity given for questions and questions answered.  Nurse  notified of   completion of discharge education. Patient is waiting for wheelchair

## 2023-02-16 NOTE — DISCHARGE SUMMARY
[facility] Medicine  Discharge Summary      Patient Name: Jarrett Charlton Jr.  MRN: [id number]  CORAZON: [id number]  Patient Class: IP- Inpatient  Admission Date: 2/9/2023  Hospital Length of Stay: 5 days  Discharge Date and Time:  02/16/2023 2:53 PM  Attending Physician: Slava Simon, *   Discharging Provider: Slava Simon MD  Primary Care Provider: Poli Gonzalez MD    Primary Care Team: Networked reference to record PCT     HPI:   72-year-old male with PMH of diabetes mellitus, CKD, hypertension, hyperlipidemia, remote history of ulcerative colitis status post total colectomy with diverting ileostomy who presented to outside hospital ER with abdominal pain and found to have cholestatic pattern elevated LFTs with mild dilatation of common bile duct on abdominal ultrasound.  Follow-up MRCP showed mild nonspecific dilatation of CBD without filling defect or intrahepatic biliary ductal dilatation.  No cholelithiasis or evidence of acute cholecystitis.  Patient was transferred to [facility] for GI/surgery evaluation.    During my encounter, patient denies any abdominal pain but reports excessive generalized itching for which she had received Benadryl prior to transfer.  Nausea associated with abdominal pain has now resolved.  Denies any diarrhea, fever.  Denies any chest pain or shortness of breath.  No family at bedside.      Procedure(s) (LRB):  CHOLECYSTECTOMY (N/A)  LYSIS, ADHESIONS (N/A)      Hospital Course:   Mr. Charlton presented with cholestatic jaundice; dilated duct seen on right upper quadrant ultrasound/MRCP.  ERCP/EUS performed on 02/10 without obstruction.  General surgery consulted with plan for cholecystectomy; performed successfully on 2/14 with open approach with lysis of adhesions by Dr. Joyce.  Procedure well-tolerated; found to have acute on chronic cholecystitis at time of operation.  Patient was monitored overnight with control of pain.  Will discharge with  "short course of p.r.n. pain medications.  He will follow-up with surgery as an outpatient.    /81 (Patient Position: Lying)   Pulse 94   Temp 98.7 °F (37.1 °C) (Oral)   Resp 18   Ht 6' 2" (1.88 m)   Wt 94.3 kg (207 lb 14.3 oz)   SpO2 95%   BMI 26.69 kg/m²   Physical Exam  Vitals and nursing note reviewed.   Constitutional:       General: He is not in acute distress.     Appearance: He is well-developed. He is not diaphoretic.      Comments: Elderly male   HENT:      Mouth/Throat:      Mouth: Mucous membranes are moist.   Neck:      Trachea: No tracheal deviation.   Cardiovascular:      Rate and Rhythm: Normal rate and regular rhythm.      Heart sounds: Normal heart sounds. No murmur heard.  Pulmonary:      Effort: Pulmonary effort is normal. No respiratory distress.      Breath sounds: Normal breath sounds. No wheezing.   Abdominal:      General: Bowel sounds are normal. There is no distension.      Palpations: Abdomen is soft.      Tenderness: There is abdominal tenderness.   Musculoskeletal:      Cervical back: Neck supple.      Right lower leg: No edema.      Left lower leg: No edema.   Skin:     General: Skin is warm and dry.      Findings: No rash.   Neurological:      Mental Status: He is alert and oriented to person, place, and time.   Psychiatric:         Behavior: Behavior normal.        Goals of Care Treatment Preferences:  Code Status: Full Code      Consults:   Consults (From admission, onward)        Status Ordering Provider     Inpatient consult to [facility]  Once        Provider:  (Not yet assigned)    LEFTY Krishnan     Inpatient consult to General Surgery  Once        Provider:  (Not yet assigned)    LEFTY Krishnan     Inpatient consult to Gastroenterology[facility]  Once        Provider:  (Not yet assigned)    Completed LEFTY LEE          Neuro  Headache  Patient has reported persistent headaches since admission.  On 02/12 he reported unilateral blurring " and lacrimation.  CT head ruled out bleed.      Patient denies any history of migraines but states he may be under stress and has been eating poorly which may be contributing to his headaches.    Neurology on board.  Appreciate recommendations.  - MRI unremarkable      Cardiac/Vascular  Paroxysmal ventricular tachycardia  Continue amiodarone   Telemetry  Ensure electrolytes are optimized      CAD (coronary artery disease)  No chest pain.  Troponin negative   Continue aspirin, beta-blocker.  Holding statin due to elevated LFT      Essential hypertension  Blood pressure consistently elevated to systolic 170s.  At times patient has abdominal discomfort while blood pressure remains elevated even if the patient is not in any pain.  Unclear if the patient needs additional antihypertensives but given headache with persistently elevated blood pressures, amlodipine 5 mg q.d. added     Holding Coreg due to bradycardia.  Elevated bile salts can contribute to bradycardia in this clinical setting.    PRN hydralazine for SBP>160  -can consider further antihypertensive additions after surgery if patient is still elevated, appears to be stable today    Renal/  Hyponatremia  - suspect factitious given normal readings until labs today  - repeat CMP      CKD (chronic kidney disease), stage IV  Patient follows with Nephrology at [facility].  Per patient he gets normal saline IV twice a week to avoid dehydration secondary to losses from his ostomy.    Renal function currently appears to be at baseline    Started on gentle hydration as anticipated to be NPO after MN  Monitor renal function closely.  Avoid nephrotoxic drugs.  Renal dose medications  Consider Nephrology consult in case of worsening renal function    Oncology  Prostate cancer  S/p TURP  Appears to be on leuprolide injections per MAR    Endocrine  Type 2 diabetes mellitus with stage 3 chronic kidney disease, without long-term current use of insulin  Patient's FSGs are  controlled on current medication regimen.  Last A1c reviewed-   Lab Results   Component Value Date    HGBA1C 7.0 (H) 12/06/2022     Most recent fingerstick glucose reviewed-   Recent Labs   Lab 02/14/23  1622 02/14/23  1940 02/15/23  0507 02/15/23  1155   POCTGLUCOSE 174* 165* 162* 151*     Current correctional scale  none    Antihyperglycemics (From admission, onward)    Start     Stop Route Frequency Ordered    02/09/23 2158  insulin aspart U-100 pen 0-5 Units         -- SubQ Before meals & nightly PRN 02/09/23 2059        Hold Oral hypoglycemics while patient is in the hospital.    Hypoglycemia-resolved as of 2/14/2023  Hypoglycemic likely in setting of NPO status with liver dysfunction. Not received any insulin    Improved on diet      GI  * Acute cholecystitis with chronic cholecystitis  Elevated LFTs, cholestatic pattern -improving overall  Dilated duct seen on right upper quadrant ultrasound/MRCP     S/p ERCP/EUS on 02/10 without obstruction   GI on board.  Per EMS, General surgery evaluation warranted    Generally surgery consult - plan for cholecystectomy which likely has to be open surgery on Tuesday 2/14.  NPO after midnight  IV PPI daily added  Trend CMP  Symptomatic management   Noted to have low-grade fevers initially but afebrile overnight.  Blood cultures showed no growth till date.  Will hold off on antibiotics at this time  - status post cholecystectomy on 2/14  -IV antibiotics as per surgery recs; postop pain control      Abnormal LFTs (liver function tests)  As above      History of ulcerative colitis  S/p colectomy/ileostomy in the distant past      GERD (gastroesophageal reflux disease)  -continue Protonix      Orthopedic  History of gout  Chronic, stable.  Continue allopurinol        Final Active Diagnoses:    Diagnosis Date Noted POA    PRINCIPAL PROBLEM:  Acute cholecystitis with chronic cholecystitis [K81.2] 02/09/2023 Yes    Hyponatremia [E87.1] 02/14/2023 No    Blurred vision, right  eye [H53.8] 02/14/2023 Yes    Headache [R51.9] 02/13/2023 Yes    Paroxysmal ventricular tachycardia [I47.29] 04/15/2021 Yes    Prostate cancer [C61] 02/11/2021 Yes    History of gout [Z87.39] 06/10/2019 Yes    Abnormal LFTs (liver function tests) [R79.89] 12/06/2017 Yes    CAD (coronary artery disease) [I25.10] 11/10/2017 Yes    CKD (chronic kidney disease), stage IV [N18.4] 05/23/2013 Yes    Type 2 diabetes mellitus with stage 3 chronic kidney disease, without long-term current use of insulin [E11.22, N18.30]  Yes    History of ulcerative colitis [Z87.19]  Not Applicable    GERD (gastroesophageal reflux disease) [K21.9]  Yes    Essential hypertension [I10]  Yes      Problems Resolved During this Admission:    Diagnosis Date Noted Date Resolved POA    Hypoglycemia [E16.2] 02/10/2023 02/14/2023 Yes       Discharged Condition: good    Disposition: Home or Self Care    Follow Up:   Follow-up Information     Bianka Arevalo NP Follow up on 3/10/2023.    Specialty: Nephrology  Why: at 8:00 AM; rescheduled nephrology appointment  Contact information:  [address]  [facility] [id number] [phone]             Poli Gonzalez MD Follow up on 2/15/2023.    Specialties: Internal Medicine, Pediatrics  Why: at 2:45pm; previously scheduled PCP appointment  Contact information:  [address] [place] [address] [phone]             Mia Gallagher NP Follow up on 2/28/2023.    Specialty: Gastroenterology  Why: at 10:30 AM;  hospital follow up appointment  Contact information:  [address]  [address]  [facility] [address] [phone]                       Patient Instructions:      Ambulatory referral/consult to General Surgery   Standing Status: Future   Referral Priority: Routine Referral Type: Consultation   Referral Reason: Specialty Services Required   Requested Specialty: General Surgery   Number of Visits Requested: 1     Diet Adult Regular     Notify your health care provider if you  experience any of the following:  temperature >100.4     Notify your health care provider if you experience any of the following:  persistent nausea and vomiting or diarrhea     Notify your health care provider if you experience any of the following:  severe uncontrolled pain     Notify your health care provider if you experience any of the following:  difficulty breathing or increased cough     Notify your health care provider if you experience any of the following:  severe persistent headache     Notify your health care provider if you experience any of the following:  persistent dizziness, light-headedness, or visual disturbances     Notify your health care provider if you experience any of the following:  increased confusion or weakness     Activity as tolerated       Significant Diagnostic Studies:  See above    Pending Diagnostic Studies:     Procedure Component Value Units Date/Time    Specimen to Pathology, Surgery General Surgery [[id number]] Collected: 02/14/23 1258    Order Status: Sent Lab Status: In process Updated: 02/15/23 3379    Specimen: Tissue          Medications:  Reconciled Home Medications:      Medication List      START taking these medications    oxyCODONE 5 MG immediate release tablet  Commonly known as: ROXICODONE  Take 1 tablet (5 mg total) by mouth every 4 (four) hours as needed for Pain.     polyethylene glycol 17 gram/dose powder  Commonly known as: GLYCOLAX  Take 17 g by mouth 2 (two) times daily as needed (constipation).        CHANGE how you take these medications    droxidopa 100 mg Cap  Take 2 capsules by mouth once daily. 400mg daily  What changed: Another medication with the same name was removed. Continue taking this medication, and follow the directions you see here.        CONTINUE taking these medications    ACCU-CHEK PAUL CONTROL SOLN Soln  Generic drug: blood glucose control high,low  1 drop by NOT APPLICABLE route once daily.     ACCU-CHEK SOFTCLIX LANCETS Misc  Generic  drug: lancets  Accucheck tid     allopurinoL 300 MG tablet  Commonly known as: ZYLOPRIM  Take 1.5 tablets (450 mg total) by mouth once daily.     amiodarone 200 MG Tab  Commonly known as: PACERONE  Take 1 tablet (200 mg total) by mouth 2 (two) times daily.     aspirin 81 MG EC tablet  Commonly known as: ECOTRIN  Take 81 mg by mouth once daily.     BD ALCOHOL SWABS Padm  Generic drug: alcohol swabs     blood sugar diagnostic Strp  Commonly known as: ONETOUCH ULTRA TEST  USE ONE STRIP TO CHECK GLUCOSE EVERY DAY     blood-glucose meter Misc  Commonly known as: ACCU-CHEK GUIDE GLUCOSE METER  Accucheck BID     brimonidine 0.2% 0.2 % Drop  Commonly known as: ALPHAGAN  INSTILL 1 DROP INTO EACH EYE TWICE DAILY     calcium citrate-vitamin D3 315 mg-6.25 mcg (250 unit) Tab  Commonly known as: CITRACAL + D MAXIMUM  Take 1 tablet by mouth once daily.     carvediloL 6.25 MG tablet  Commonly known as: COREG  Take 1 tablet (6.25 mg total) by mouth 2 (two) times daily with meals.     FIBER (CALCIUM POLYCARBOPHIL) 625 mg tablet  Generic drug: polycarbophil  Take 3 tablets by mouth before dinner.     glimepiride 4 MG tablet  Commonly known as: AMARYL  Take 4 mg by mouth 2 (two) times daily before meals.     pantoprazole 40 MG tablet  Commonly known as: PROTONIX  Take 40 mg by mouth once daily.     pravastatin 80 MG tablet  Commonly known as: PRAVACHOL  Take 1 tablet (80 mg total) by mouth once daily.        STOP taking these medications    ALPRAZolam 0.5 MG tablet  Commonly known as: XANAX            Indwelling Lines/Drains at time of discharge:   Lines/Drains/Airways     Drain  Duration                Ileostomy 01/07/19 1101 RLQ 1501 days                Time spent on the discharge of patient: 31 minutes         Slava Simon MD  Department of Hospital Medicine  [facility]

## 2023-02-16 NOTE — ASSESSMENT & PLAN NOTE
72M with resolving elevated LFTs and biliary colic s/p open cholecystectomy on 2/14     -Diet  -Okay to stop Abx  -Needs to move around. PT/OT  -Will need follow-up in clinic upon discharge   -Please call with questions

## 2023-02-16 NOTE — PROGRESS NOTES
[facility]  General Surgery  Progress Note    Subjective:     History of Present Illness:  No notes on file    Post-Op Info:  Procedure(s) (LRB):  CHOLECYSTECTOMY (N/A)  LYSIS, ADHESIONS (N/A)   2 Days Post-Op     Interval History: NAEO. Still has pain but better. Hasn't been moving around. Tolerating diet. AF. HDS.     Medications:  Continuous Infusions:   sodium chloride 0.9% 100 mL/hr at 02/16/23 0850     Scheduled Meds:   acetaminophen  1,000 mg Oral TID    allopurinoL  300 mg Oral Daily    amiodarone  200 mg Oral BID    amLODIPine  5 mg Oral Daily    ampicillin-sulbactim (UNASYN) IVPB  3 g Intravenous Q8H    artificial tears  1 drop Both Eyes BID    aspirin  81 mg Oral Daily    brimonidine 0.2%  1 drop Both Eyes BID    enoxaparin  40 mg Subcutaneous Daily    pantoprazole  40 mg Oral Daily     PRN Meds:aluminum-magnesium hydroxide-simethicone, butalbital-acetaminophen-caffeine -40 mg, dextrose 10%, dextrose 10%, dextrose 10%, dextrose 10%, glucagon (human recombinant), glucagon (human recombinant), glucose, glucose, glucose, glucose, hydrALAZINE, HYDROmorphone, hydrOXYzine HCL, insulin aspart U-100, meclizine, melatonin, naloxone, ondansetron, ondansetron, oxyCODONE, oxyCODONE, simethicone, sodium chloride 0.9%     Review of patient's allergies indicates:   Allergen Reactions    Crestor [rosuvastatin]     Lipitor [atorvastatin]      Objective:     Vital Signs (Most Recent):  Temp: 98.5 °F (36.9 °C) (02/16/23 0731)  Pulse: 78 (02/16/23 0731)  Resp: 20 (02/16/23 0815)  BP: (!) 177/88 (02/16/23 0731)  SpO2: (!) 94 % (02/16/23 0819)   Vital Signs (24h Range):  Temp:  [97.3 °F (36.3 °C)-99.6 °F (37.6 °C)] 98.5 °F (36.9 °C)  Pulse:  [75-86] 78  Resp:  [16-20] 20  SpO2:  [94 %-97 %] 94 %  BP: (117-177)/(65-88) 177/88     Weight: 94.3 kg (207 lb 14.3 oz)  Body mass index is 26.69 kg/m².    Intake/Output - Last 3 Shifts         02/14 0700  02/15 0659 02/15 0700  02/16 0659 02/16 0700 02/17  0659    I.V. (mL/kg) 250 (2.7)      IV Piggyback 800      Total Intake(mL/kg) 1050 (11.1)      Urine (mL/kg/hr) 900 (0.4) 350 (0.2)     Stool  200     Total Output 900 550     Net +150 -550            Stool Occurrence  1 x             Physical Exam  Vitals reviewed.   Constitutional:       General: He is not in acute distress.  HENT:      Head: Normocephalic and atraumatic.      Nose: Nose normal.   Eyes:      General:         Right eye: No discharge.         Left eye: No discharge.   Cardiovascular:      Rate and Rhythm: Normal rate and regular rhythm.   Pulmonary:      Effort: Pulmonary effort is normal. No respiratory distress.      Breath sounds: No stridor.   Abdominal:      Comments: Soft, ND  Midline laparotomy  Ileostomy in place   RUQ incisions with some SS drainage. Staples and dressing in place    Musculoskeletal:         General: Normal range of motion.      Cervical back: Normal range of motion.   Skin:     General: Skin is warm and dry.      Capillary Refill: Capillary refill takes 2 to 3 seconds.   Neurological:      General: No focal deficit present.      Mental Status: He is alert and oriented to person, place, and time.   Psychiatric:         Mood and Affect: Mood normal.         Behavior: Behavior normal.       Significant Labs:  I have reviewed all pertinent lab results within the past 24 hours.    Significant Diagnostics:  I have reviewed all pertinent imaging results/findings within the past 24 hours.    Assessment/Plan:     * Acute cholecystitis with chronic cholecystitis  72M with resolving elevated LFTs and biliary colic s/p open cholecystectomy on 2/14     -Diet  -Okay to stop Abx  -Needs to move around. PT/OT  -Will need follow-up in clinic upon discharge   -Please call with questions         Jered Grider MD  General Surgery  [place] - Telemetry

## 2023-02-16 NOTE — NURSING
RN received discharge orders for patient. AVS given, and peripheral IV removed. Patient had granddaughter go downstairs to [facility]'s to  prescriptions instead of getting meds to bedside. VN to go over discharge instructions and order wheelchair.

## 2023-02-16 NOTE — PLAN OF CARE
Problem: Physical Therapy  Goal: Physical Therapy Goal  Outcome: Met     Pt functioning at supervision/mod I level with no AD with no further IP PT needs, d/c PT.

## 2023-02-16 NOTE — PT/OT/SLP EVAL
Physical Therapy Evaluation and Discharge Note    Patient Name:  Jarrett Charlton Jr.   MRN:  [id number]    Recommendations:     Discharge Recommendations: home  Discharge Equipment Recommendations: none   Barriers to Discharge: None    Assessment:     Jarrett Charlton Jr. is a 72 y.o. male admitted with a medical diagnosis of Acute cholecystitis with chronic cholecystitis. At this time, patient is functioning at their prior level of function and does not require further acute PT services.     Recent Surgery: Procedure(s) (LRB):  CHOLECYSTECTOMY (N/A)  LYSIS, ADHESIONS (N/A) 2 Days Post-Op    Plan:     During this hospitalization, patient does not require further acute PT services.  Please re-consult if situation changes.      Subjective     Chief Complaint: R abdominal pain  Patient/Family Comments/goals: pt reporting no concerns regarding d/c  Pain/Comfort:  Pain Rating 1: 8/10  Location - Side 1: Right  Location 1: abdomen  Pain Addressed 1: Distraction, Reposition  Pain Rating Post-Intervention 1: 6/10    Patients cultural, spiritual, Restorationist conflicts given the current situation: no    Living Environment:  Pt lives with his granddaughter in a mobile home with ramp entrance and WIS with seat.  Prior to admission, patients level of function was independent without AD for mobility and ADLs, drives, cooks, cleans.  Equipment used at home: none.  DME owned (not currently used): none. Upon discharge, patient will have assistance from his granddaughter works during the days.    Objective:     Communicated with nurse Casey prior to session.  Patient found sitting edge of bed with   upon PT entry to room.    General Precautions: Standard,      Orthopedic Precautions:N/A   Braces: N/A  Respiratory Status: Room air    Exams:  Gross Motor Coordination:  WFL  Postural Exam:  Patient presented with the following abnormalities:    -       Rounded shoulders  -       Forward head  Sensation:    -       Intact  Skin  Integrity/Edema:      -       Skin integrity: Visible skin intact  RLE ROM: WFL  RLE Strength: WFL  LLE ROM: WFL  LLE Strength: WFL    Functional Mobility:  Bed Mobility:     Scooting: modified independence  Sit to Supine: modified independence  Transfers:     Sit to Stand:  modified independence with no AD  Gait: ~200 ft with no AD at supervision/mod I level - pt intermittently reaching for bar on wall, no instability noted, reporting feeling at baseline    AM-PAC 6 CLICK MOBILITY  Total Score:23       Treatment and Education:  Educated pt on role of PT, pt sitting EOB with PCT and agreeable to participate in therapy session.  Completed assessment with no deficits noted then ambulated in sanchez as reported above.  Returned to bed.  Educated on car transfers and pt reporting no questions/concerns regarding d/c.    AM-PAC 6 CLICK MOBILITY  Total Score:23     Patient left HOB elevated with all lines intact, call button in reach, and bed alarm on.    GOALS:   Multidisciplinary Problems       Physical Therapy Goals       Not on file              Multidisciplinary Problems (Resolved)          Problem: Physical Therapy    Goal Priority Disciplines Outcome Goal Variances Interventions   Physical Therapy Goal   (Resolved)     PT, PT/OT Met                         History:     Past Medical History:   Diagnosis Date    Basal cell carcinoma     BPH (benign prostatic hyperplasia)     s/p TURP    Carotid stenosis     Chronic kidney disease     Claudication     Coronary artery disease     DDD (degenerative disc disease) 10/21/2013    Diabetes mellitus with renal complications     Disc disease, degenerative, cervical     Encounter for blood transfusion     GERD (gastroesophageal reflux disease)     Gout, chronic     History of ulcerative colitis     s/p colectomy and ileostomy    HLD (hyperlipidemia)     HTN (hypertension)     Ileostomy in place 1982    RBBB     Squamous cell carcinoma 03/08/2018    Left superior helix near  insertion    Squamous cell carcinoma 04/12/2018    Left forearm x 5    Ventricular tachycardia        Past Surgical History:   Procedure Laterality Date    cardiac stents      CATARACT EXTRACTION Bilateral     CERVICAL FUSION      CHOLECYSTECTOMY N/A 2/14/2023    Procedure: CHOLECYSTECTOMY;  Surgeon: Mike Joyce MD;  Location: [facility];  Service: General;  Laterality: N/A;  very high probabilty of converison to open    colectomy and ileostomy  1985    ENDOSCOPIC ULTRASOUND OF UPPER GASTROINTESTINAL TRACT N/A 2/10/2023    Procedure: ULTRASOUND, UPPER GI TRACT, ENDOSCOPIC;  Surgeon: Poli Fuller MD;  Location: [facility] ENDO;  Service: Endoscopy;  Laterality: N/A;    ERCP N/A 2/10/2023    Procedure: ERCP (ENDOSCOPIC RETROGRADE CHOLANGIOPANCREATOGRAPHY);  Surgeon: Poli Fuller MD;  Location: [facility] ENDO;  Service: Endoscopy;  Laterality: N/A;    EXCISION OF PAROTID GLAND Left 12/18/2020    Procedure: EXCISION, PAROTID GLAND;  Surgeon: Michael Pinzon MD;  [address]  Service: ENT;  Laterality: Left;    INSERTION OF IMPLANTABLE LOOP RECORDER  06/07/2021    INSERTION OF IMPLANTABLE LOOP RECORDER Left 6/7/2021    Procedure: INSERTION, IMPLANTABLE LOOP RECORDER;  Surgeon: Romario Dao MD;  Location: [facility] CATH LAB;  Service: Cardiology;  Laterality: Left;    LEFT HEART CATHETERIZATION Right 4/15/2021    Procedure: CATHETERIZATION, HEART, LEFT;  Surgeon: Marcio Jones MD;  Location: [facility] CATH LAB;  Service: Cardiology;  Laterality: Right;    LYSIS OF ADHESIONS N/A 11/9/2020    Procedure: LYSIS, ADHESIONS,  ERAS low;  Surgeon: CED Haley MD;  [facility];  Service: Colon and Rectal;  Laterality: N/A;    LYSIS OF ADHESIONS N/A 2/14/2023    Procedure: LYSIS, ADHESIONS;  Surgeon: Mike Joyce MD;  Location: [facility] OR;  Service: General;  Laterality: N/A;    POUCHOSCOPY N/A 4/6/2022    Procedure: ENDOSCOPY, POUCH, SMALL INTESTINE, DIAGNOSTIC;  Surgeon: Dieter Juarez MD;   Location: [facility] (2ND FLR);  Service: Endoscopy;  Laterality: N/A;    REPAIR, HERNIA, PARASTOMAL N/A 11/9/2020    Procedure: REPAIR, HERNIA, PARASTOMAL;  Surgeon: CED Haley MD;  Location: [facility] OR 2ND FLR;  Service: Colon and Rectal;  Laterality: N/A;    TRANSURETHRAL RESECTION OF PROSTATE (TURP) WITHOUT USE OF LASER N/A 1/23/2019    Procedure: TURP, WITHOUT USING LASER BIPOLAR;  Surgeon: Catarino Mota MD;  Location: [facility] OR 1ST FLR;  Service: Urology;  Laterality: N/A;  1.5 HOURS       Time Tracking:     PT Received On: 02/16/23  PT Start Time: 1119     PT Stop Time: 1131  PT Total Time (min): 12 min     Billable Minutes: Evaluation 12 02/16/2023

## 2023-02-16 NOTE — SUBJECTIVE & OBJECTIVE
Interval History: NAEO. Still has pain but better. Hasn't been moving around. Tolerating diet. AF. HDS.     Medications:  Continuous Infusions:   sodium chloride 0.9% 100 mL/hr at 02/16/23 0850     Scheduled Meds:   acetaminophen  1,000 mg Oral TID    allopurinoL  300 mg Oral Daily    amiodarone  200 mg Oral BID    amLODIPine  5 mg Oral Daily    ampicillin-sulbactim (UNASYN) IVPB  3 g Intravenous Q8H    artificial tears  1 drop Both Eyes BID    aspirin  81 mg Oral Daily    brimonidine 0.2%  1 drop Both Eyes BID    enoxaparin  40 mg Subcutaneous Daily    pantoprazole  40 mg Oral Daily     PRN Meds:aluminum-magnesium hydroxide-simethicone, butalbital-acetaminophen-caffeine -40 mg, dextrose 10%, dextrose 10%, dextrose 10%, dextrose 10%, glucagon (human recombinant), glucagon (human recombinant), glucose, glucose, glucose, glucose, hydrALAZINE, HYDROmorphone, hydrOXYzine HCL, insulin aspart U-100, meclizine, melatonin, naloxone, ondansetron, ondansetron, oxyCODONE, oxyCODONE, simethicone, sodium chloride 0.9%     Review of patient's allergies indicates:   Allergen Reactions    Crestor [rosuvastatin]     Lipitor [atorvastatin]      Objective:     Vital Signs (Most Recent):  Temp: 98.5 °F (36.9 °C) (02/16/23 0731)  Pulse: 78 (02/16/23 0731)  Resp: 20 (02/16/23 0815)  BP: (!) 177/88 (02/16/23 0731)  SpO2: (!) 94 % (02/16/23 0819)   Vital Signs (24h Range):  Temp:  [97.3 °F (36.3 °C)-99.6 °F (37.6 °C)] 98.5 °F (36.9 °C)  Pulse:  [75-86] 78  Resp:  [16-20] 20  SpO2:  [94 %-97 %] 94 %  BP: (117-177)/(65-88) 177/88     Weight: 94.3 kg (207 lb 14.3 oz)  Body mass index is 26.69 kg/m².    Intake/Output - Last 3 Shifts         02/14 0700  02/15 0659 02/15 0700  02/16 0659 02/16 0700 02/17 0659    I.V. (mL/kg) 250 (2.7)      IV Piggyback 800      Total Intake(mL/kg) 1050 (11.1)      Urine (mL/kg/hr) 900 (0.4) 350 (0.2)     Stool  200     Total Output 900 550     Net +150 -550            Stool Occurrence  1 x              Physical Exam  Vitals reviewed.   Constitutional:       General: He is not in acute distress.  HENT:      Head: Normocephalic and atraumatic.      Nose: Nose normal.   Eyes:      General:         Right eye: No discharge.         Left eye: No discharge.   Cardiovascular:      Rate and Rhythm: Normal rate and regular rhythm.   Pulmonary:      Effort: Pulmonary effort is normal. No respiratory distress.      Breath sounds: No stridor.   Abdominal:      Comments: Soft, ND  Midline laparotomy  Ileostomy in place   RUQ incisions with some SS drainage. Staples and dressing in place    Musculoskeletal:         General: Normal range of motion.      Cervical back: Normal range of motion.   Skin:     General: Skin is warm and dry.      Capillary Refill: Capillary refill takes 2 to 3 seconds.   Neurological:      General: No focal deficit present.      Mental Status: He is alert and oriented to person, place, and time.   Psychiatric:         Mood and Affect: Mood normal.         Behavior: Behavior normal.       Significant Labs:  I have reviewed all pertinent lab results within the past 24 hours.    Significant Diagnostics:  I have reviewed all pertinent imaging results/findings within the past 24 hours.

## 2023-02-16 NOTE — PLAN OF CARE
1250  CM received a call from the pt stating that he is leaving the hospital & has already called a ride. CM informed Dr Simon of above. MD stated that the pt is medically stable to discharge & will enter dc orders.     Patient awake & alert in bed with nurse Casey at the bedside when CM rounded. No family present. Patient verbalized frustration with being told different things from MDs. CM & nurse provided support. Patient requested to have prescriptions filled by the [facility] retail pharmacy prior to discharge as previously scheduled. Pt verbalized understanding regarding the hospital follow up appointments with Dr Joyce (gen surg), NICKY Gallagher (GI), & NICKY Arevalo (neph).     1420  Message sent to nurse Casey, virtual nurse Sigala, & pharmacist Elkin informing that the pt is cleared to discharge after prescriptions are delivered to the bedside.       Will continue to follow.

## 2023-02-16 NOTE — HOSPITAL COURSE
"Mr. Charlton presented with cholestatic jaundice; dilated duct seen on right upper quadrant ultrasound/MRCP.  ERCP/EUS performed on 02/10 without obstruction.  General surgery consulted with plan for cholecystectomy; performed successfully on 2/14 with open approach with lysis of adhesions by Dr. Joyce.  Procedure well-tolerated; found to have acute on chronic cholecystitis at time of operation.  Patient was monitored overnight with control of pain.  Will discharge with short course of p.r.n. pain medications.  He will follow-up with surgery as an outpatient.    /81 (Patient Position: Lying)   Pulse 94   Temp 98.7 °F (37.1 °C) (Oral)   Resp 18   Ht 6' 2" (1.88 m)   Wt 94.3 kg (207 lb 14.3 oz)   SpO2 95%   BMI 26.69 kg/m²   Physical Exam  Vitals and nursing note reviewed.   Constitutional:       General: He is not in acute distress.     Appearance: He is well-developed. He is not diaphoretic.      Comments: Elderly male   HENT:      Mouth/Throat:      Mouth: Mucous membranes are moist.   Neck:      Trachea: No tracheal deviation.   Cardiovascular:      Rate and Rhythm: Normal rate and regular rhythm.      Heart sounds: Normal heart sounds. No murmur heard.  Pulmonary:      Effort: Pulmonary effort is normal. No respiratory distress.      Breath sounds: Normal breath sounds. No wheezing.   Abdominal:      General: Bowel sounds are normal. There is no distension.      Palpations: Abdomen is soft.      Tenderness: There is abdominal tenderness.   Musculoskeletal:      Cervical back: Neck supple.      Right lower leg: No edema.      Left lower leg: No edema.   Skin:     General: Skin is warm and dry.      Findings: No rash.   Neurological:      Mental Status: He is alert and oriented to person, place, and time.   Psychiatric:         Behavior: Behavior normal.   "

## 2023-02-17 ENCOUNTER — PATIENT OUTREACH (OUTPATIENT)
Dept: ADMINISTRATIVE | Facility: CLINIC | Age: 73
End: 2023-02-17
Payer: MEDICARE

## 2023-02-17 ENCOUNTER — TELEPHONE (OUTPATIENT)
Dept: SURGERY | Facility: CLINIC | Age: 73
End: 2023-02-17
Payer: MEDICARE

## 2023-02-17 LAB
FINAL PATHOLOGIC DIAGNOSIS: NORMAL
GROSS: NORMAL
Lab: NORMAL

## 2023-02-17 RX ORDER — OXYCODONE AND ACETAMINOPHEN 5; 325 MG/1; MG/1
1 TABLET ORAL EVERY 6 HOURS PRN
Qty: 20 TABLET | Refills: 0 | OUTPATIENT
Start: 2023-02-17 | End: 2023-02-24

## 2023-02-17 NOTE — PROGRESS NOTES
C3 nurse attempted to contact patient for a TCC post hospital discharge follow-up call. The patient declined call at this time.

## 2023-02-17 NOTE — PLAN OF CARE
[facility] - Telemetry  Discharge Final Note    Primary Care Provider: Poli Gonzalez MD    Expected Discharge Date: 2/16/2023    Final Discharge Note (most recent)       Final Note - 02/17/23 0648          Final Note    Assessment Type Final Discharge Note (P)      Anticipated Discharge Disposition Home or Self Care (P)      Hospital Resources/Appts/Education Provided Appointments scheduled and added to AVS (P)                      Important Message from Medicare  Important Message from Medicare regarding Discharge Appeal Rights: Given to patient/caregiver, Explained to patient/caregiver, Signed/date by patient/caregiver     Date IMM was signed: 02/16/23  Time IMM was signed: 1148    Contact Info       Bianka Arevalo NP   Specialty: Nephrology    [address] [facility] [address]   Phone: [phone]       Next Steps: Follow up on 3/10/2023    Instructions: at 8:00 AM; rescheduled nephrology appointment    Mia Gallagher NP   Specialty: Gastroenterology    [address]  [address]  [place] [address]   Phone: [phone]       Next Steps: Follow up on 2/28/2023    Instructions: at 10:30 AM; GI hospital follow up appointment

## 2023-02-20 ENCOUNTER — PATIENT OUTREACH (OUTPATIENT)
Dept: ADMINISTRATIVE | Facility: OTHER | Age: 73
End: 2023-02-20
Payer: MEDICARE

## 2023-02-20 ENCOUNTER — TELEPHONE (OUTPATIENT)
Dept: SURGERY | Facility: CLINIC | Age: 73
End: 2023-02-20
Payer: MEDICARE

## 2023-02-20 ENCOUNTER — TELEPHONE (OUTPATIENT)
Dept: PRIMARY CARE CLINIC | Facility: CLINIC | Age: 73
End: 2023-02-20
Payer: MEDICARE

## 2023-02-20 NOTE — TELEPHONE ENCOUNTER
----- Message from Mary Browning sent at 2/20/2023  8:43 AM CST -----  Contact: [phone]  Pt called to advise that he was released from the hospital after a 9 day stay on 2/16/23. Pt called to make a hospital f/u appointment, but the next available was 3/13/23. Pt says he needs to be seen 7-10 days since d/c. Please Advise

## 2023-02-20 NOTE — TELEPHONE ENCOUNTER
----- Message from Oneyda Vasquez sent at 2/20/2023 10:15 AM CST -----  Type:  Needs Medical Advice    Who Called:  pt  Symptoms (please be specific):  would like to get a call back to discuss when he will be getting staples removed      Would the patient rather a call back or a response via [facility]? call  Best Call Back Number:  [phone]  Additional Information:             02/20/2023                     1059  Spoke to patient regarding the above message. Patient inquired about his staples and who will remove them. Instructed patient to follow up with Dr. Joyce at his appointment on 02/28/2023. Advised him  not to allow anyone to remove his jesus manuel; Dr. Joyce will evaluate his wound and address the removal of staples at that time. Patient verbalized understanding.

## 2023-02-20 NOTE — PROGRESS NOTES
IP Liaison - Final Visit Note    Patient: Jarrett Charlton Jr.  MRN:  [id number]  Date of Service:  2/20/2023  Completed by:  REFUGIO Ferro    Reason for Visit   Patient presents with    IP Liaison Chart Review     Patient discharged from hospital before DANIELW was able to complete follow-up visit.        Patient Summary     Discharge Date: 2/16/2023  Discharge telephone number/address: [phone] / [address] [place] [address]  Follow up provider: Mike Joyce MD / Bianka Arevalo NP / Mia Gallagher NP  Follow up appointments: 2/28/2023 @ 9:40am / 3/10/2023 @ 8:00am / 2/28/2023 @ 10:30am  Home Health agency & telephone number: n/a  DME ordered &  name: n/a  Assigned OPCM RN/SW: n/a  Report sent to follow up team (PCP/OPCM) via in basket message: n/a  Community Resources arranged including agency name & contact info: referral to [facility] ([facility])      REFUGIO Ferro

## 2023-02-23 ENCOUNTER — TELEPHONE (OUTPATIENT)
Dept: NEPHROLOGY | Facility: CLINIC | Age: 73
End: 2023-02-23
Payer: MEDICARE

## 2023-02-23 NOTE — TELEPHONE ENCOUNTER
Returned pt's call.    He reports blood pressures are stable at home.  Will obtain repeat 24 hour urine to test levels that were not included previously.  Planning to decrease IVF to once per week, but pt reported that he started having liquid stools today. He plans to take fiber that has helped previously and will report when the liquid stools resolve. Once they resolve, will attempt to wean IVF.        ----- Message from Gricel Watkins MA sent at 2/23/2023 10:12 AM CST -----  Contact: pt    ----- Message -----  From: Hannah [place]  Sent: 2/23/2023  10:03 AM CST  To: [facility] Staff    Pt had blood work completed yesterday and was informed that his kidney levels were not good and is requesting for provider to look over the lab results to see make sure everything is okay. He would like a call back to discuss his results. He also has question the 24 hour urine test as well.     Confirmed contact below:  Contact Name:Jarrett Charlton  Phone Number: [phone]

## 2023-02-27 PROBLEM — T14.8XXA BLEEDING FROM WOUND: Status: ACTIVE | Noted: 2023-02-27

## 2023-02-28 ENCOUNTER — OFFICE VISIT (OUTPATIENT)
Dept: GASTROENTEROLOGY | Facility: CLINIC | Age: 73
End: 2023-02-28
Payer: MEDICARE

## 2023-02-28 ENCOUNTER — OFFICE VISIT (OUTPATIENT)
Dept: SURGERY | Facility: CLINIC | Age: 73
End: 2023-02-28
Payer: MEDICARE

## 2023-02-28 VITALS
DIASTOLIC BLOOD PRESSURE: 95 MMHG | WEIGHT: 209.75 LBS | HEART RATE: 69 BPM | HEIGHT: 74 IN | SYSTOLIC BLOOD PRESSURE: 161 MMHG | BODY MASS INDEX: 26.92 KG/M2

## 2023-02-28 DIAGNOSIS — Z93.2 ILEOSTOMY IN PLACE: ICD-10-CM

## 2023-02-28 DIAGNOSIS — K52.9 CHRONIC DIARRHEA: ICD-10-CM

## 2023-02-28 DIAGNOSIS — K81.2 ACUTE CHOLECYSTITIS WITH CHRONIC CHOLECYSTITIS: Primary | ICD-10-CM

## 2023-02-28 DIAGNOSIS — Z09 HOSPITAL DISCHARGE FOLLOW-UP: Primary | ICD-10-CM

## 2023-02-28 PROCEDURE — 1125F AMNT PAIN NOTED PAIN PRSNT: CPT | Mod: HCNC,CPTII,S$GLB, | Performed by: SURGERY

## 2023-02-28 PROCEDURE — 1160F PR REVIEW ALL MEDS BY PRESCRIBER/CLIN PHARMACIST DOCUMENTED: ICD-10-PCS | Mod: HCNC,CPTII,S$GLB, | Performed by: SURGERY

## 2023-02-28 PROCEDURE — 1125F PR PAIN SEVERITY QUANTIFIED, PAIN PRESENT: ICD-10-PCS | Mod: HCNC,CPTII,S$GLB, | Performed by: SURGERY

## 2023-02-28 PROCEDURE — 99999 PR PBB SHADOW E&M-EST. PATIENT-LVL IV: ICD-10-PCS | Mod: PBBFAC,HCNC,, | Performed by: SURGERY

## 2023-02-28 PROCEDURE — 99024 PR POST-OP FOLLOW-UP VISIT: ICD-10-PCS | Mod: HCNC,S$GLB,, | Performed by: SURGERY

## 2023-02-28 PROCEDURE — 3077F PR MOST RECENT SYSTOLIC BLOOD PRESSURE >= 140 MM HG: ICD-10-PCS | Mod: HCNC,CPTII,S$GLB, | Performed by: SURGERY

## 2023-02-28 PROCEDURE — 99214 OFFICE O/P EST MOD 30 MIN: CPT | Mod: HCNC,S$GLB,, | Performed by: NURSE PRACTITIONER

## 2023-02-28 PROCEDURE — 3044F PR MOST RECENT HEMOGLOBIN A1C LEVEL <7.0%: ICD-10-PCS | Mod: HCNC,CPTII,S$GLB, | Performed by: NURSE PRACTITIONER

## 2023-02-28 PROCEDURE — 99999 PR PBB SHADOW E&M-EST. PATIENT-LVL IV: CPT | Mod: PBBFAC,HCNC,, | Performed by: SURGERY

## 2023-02-28 PROCEDURE — 3288F PR FALLS RISK ASSESSMENT DOCUMENTED: ICD-10-PCS | Mod: HCNC,CPTII,S$GLB, | Performed by: SURGERY

## 2023-02-28 PROCEDURE — 1111F DSCHRG MED/CURRENT MED MERGE: CPT | Mod: HCNC,CPTII,S$GLB, | Performed by: NURSE PRACTITIONER

## 2023-02-28 PROCEDURE — 3008F PR BODY MASS INDEX (BMI) DOCUMENTED: ICD-10-PCS | Mod: HCNC,CPTII,S$GLB, | Performed by: SURGERY

## 2023-02-28 PROCEDURE — 1159F MED LIST DOCD IN RCRD: CPT | Mod: HCNC,CPTII,S$GLB, | Performed by: SURGERY

## 2023-02-28 PROCEDURE — 3077F SYST BP >= 140 MM HG: CPT | Mod: HCNC,CPTII,S$GLB, | Performed by: SURGERY

## 2023-02-28 PROCEDURE — 3080F PR MOST RECENT DIASTOLIC BLOOD PRESSURE >= 90 MM HG: ICD-10-PCS | Mod: HCNC,CPTII,S$GLB, | Performed by: SURGERY

## 2023-02-28 PROCEDURE — 99024 POSTOP FOLLOW-UP VISIT: CPT | Mod: HCNC,S$GLB,, | Performed by: SURGERY

## 2023-02-28 PROCEDURE — 3008F BODY MASS INDEX DOCD: CPT | Mod: HCNC,CPTII,S$GLB, | Performed by: SURGERY

## 2023-02-28 PROCEDURE — 3044F HG A1C LEVEL LT 7.0%: CPT | Mod: HCNC,CPTII,S$GLB, | Performed by: NURSE PRACTITIONER

## 2023-02-28 PROCEDURE — 99999 PR PBB SHADOW E&M-EST. PATIENT-LVL I: ICD-10-PCS | Mod: PBBFAC,HCNC,, | Performed by: NURSE PRACTITIONER

## 2023-02-28 PROCEDURE — 99214 PR OFFICE/OUTPT VISIT, EST, LEVL IV, 30-39 MIN: ICD-10-PCS | Mod: HCNC,S$GLB,, | Performed by: NURSE PRACTITIONER

## 2023-02-28 PROCEDURE — 3044F HG A1C LEVEL LT 7.0%: CPT | Mod: HCNC,CPTII,S$GLB, | Performed by: SURGERY

## 2023-02-28 PROCEDURE — 1159F PR MEDICATION LIST DOCUMENTED IN MEDICAL RECORD: ICD-10-PCS | Mod: HCNC,CPTII,S$GLB, | Performed by: SURGERY

## 2023-02-28 PROCEDURE — 1160F RVW MEDS BY RX/DR IN RCRD: CPT | Mod: HCNC,CPTII,S$GLB, | Performed by: SURGERY

## 2023-02-28 PROCEDURE — 1111F PR DISCHARGE MEDS RECONCILED W/ CURRENT OUTPATIENT MED LIST: ICD-10-PCS | Mod: HCNC,CPTII,S$GLB, | Performed by: NURSE PRACTITIONER

## 2023-02-28 PROCEDURE — 3288F FALL RISK ASSESSMENT DOCD: CPT | Mod: HCNC,CPTII,S$GLB, | Performed by: SURGERY

## 2023-02-28 PROCEDURE — 3044F PR MOST RECENT HEMOGLOBIN A1C LEVEL <7.0%: ICD-10-PCS | Mod: HCNC,CPTII,S$GLB, | Performed by: SURGERY

## 2023-02-28 PROCEDURE — 3080F DIAST BP >= 90 MM HG: CPT | Mod: HCNC,CPTII,S$GLB, | Performed by: SURGERY

## 2023-02-28 PROCEDURE — 99999 PR PBB SHADOW E&M-EST. PATIENT-LVL I: CPT | Mod: PBBFAC,HCNC,, | Performed by: NURSE PRACTITIONER

## 2023-02-28 PROCEDURE — 1101F PR PT FALLS ASSESS DOC 0-1 FALLS W/OUT INJ PAST YR: ICD-10-PCS | Mod: HCNC,CPTII,S$GLB, | Performed by: SURGERY

## 2023-02-28 PROCEDURE — 1101F PT FALLS ASSESS-DOCD LE1/YR: CPT | Mod: HCNC,CPTII,S$GLB, | Performed by: SURGERY

## 2023-02-28 NOTE — PATIENT INSTRUCTIONS
Continue daily fiber supplement.     Continue Imodium as needed. If output is increased or if Imodium is not helping, let us know and we'll send in a new medicine.     Follow up as needed.

## 2023-02-28 NOTE — PROGRESS NOTES
[facility]  POST-OP NOTE    HPI: Jarrett Charlton Jr. is a 72 y.o. male s/p open chato.  Doing well.  Had decompression of old hematoma from wound last Friday.  Pre-op symptoms resolved.      VITALS:  Vitals:    02/28/23 0919   BP: (!) 161/95   Pulse: 69       PHYSICAL EXAM:  GEN: NAD  HEENT: NCAT  ABD: incisions C/D/I    PATHOLOGY:  Reviewed      ASSESSMENT & PLAN:  72 y.o. male s/p chato.  RTC one week for staple removal      Mike Joyce M.D., F.A.C.S.  [facility]  [facility]

## 2023-02-28 NOTE — PROGRESS NOTES
GASTROENTEROLOGY CLINIC NOTE    Chief Complaint: The primary encounter diagnosis was Hospital discharge follow-up. Diagnoses of Ileostomy in place and Chronic diarrhea were also pertinent to this visit.  Referring provider/PCP: Poli Gonzalez MD    Jarrett Charlton Jr. is a 72 y.o. male who is a new patient to me with a PMH that's significant for Ulcerative Colitis, CKD, DM, and GERD and is accompanied by his niece.  He is here today to establish care for hospital follow up.  Patient transferred to [facility] for GI evaluation d/t upper abdominal pain, distension, and nausea.  Mild dilation noted on MRI.  Patient underwent EUS which dilated bile duct noted. He then underwent cholecystectomy.  He was subsequently discharged with instructions to follow up with GI.  Reports doing well.  Continues with minor RUQ pain but reports it is improving.  Bloating has also improved. No nausea, vomiting, fever, or chills noted. Ileostomy without increased output noted. Some formed stool noted. Taking Imodium as needed and daily Fiber supplement which helps control diarrhea.     NSAIDs: ASA 81mg  Anticoagulation or Antiplatelet: No    History of H.pylori:  H.pylori Treatment:  Prior Upper Endoscopy:   Upper EUS: 2/2023 with Dr. Fuller  Impression:            - Normal esophagus.                          - Normal stomach.                          - Duodenal diverticulum with the papilla deep                          within                          - There was dilation in the middle third of the                          main bile duct and in the upper third of the main                          bile duct which measured up to 10 mm tapering to                          the papilla without obstruction. Duodenal                          diverticula could have possible obscured the                          distal duct.                          - There was no sign of significant pathology in                          the entire pancreas.                           - There was no sign of significant pathology in                          the ampulla.                          - No specimens collected.     Recommendation:        - Return patient to [facility] for ongoing care.                          - Resume previous diet.                          - Continue present medications.                          - Refer to a surgeon as previously scheduled.     Prior Colonoscopy:  Ileoscopy: 4/2022 with Dr. Juarez  Findings:        Patient is status-post total colectomy with an end ileostomy. The        stoma is healthy appearing, with a small lumen, which was unable to        be digitized or have PCF inserted through. As such scope was        exchanged for XP.        A large amount of stool was found in the distal ileum, making        visualization difficult.        There is no endoscopic evidence of stenosis, stricture or        ulcerations in the entire examined ileum. Exam performed up to 25cm        from stoma.     Impression:            - Preparation of the colon was poor.                          - Stool in the distal ileum.                          - Healthy appearing ileum     Recommendation:        - Return patient to [facility] for ongoing care.                          - Continue present diet                          - Follow clinical course; could consider further                          small bowel imaging if symptoms remain     Family h/o Colon Cancer: No  Family h/o Crohn's Disease or Ulcerative Colitis: No  Family h/o Celiac Sprue: No  Abdominal Surgeries: Cholecystectomy 2023  total proctocolectomy with end ileostomy that was performed initially in 1985. 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      Review of Systems   Constitutional:  Negative for weight loss.   HENT:  Negative for sore throat.    Eyes:  Negative for blurred vision.   Respiratory:  Negative for cough.     Cardiovascular:  Negative for chest pain.   Gastrointestinal:  Positive for abdominal pain (improving). Negative for blood in stool, constipation, diarrhea, heartburn, melena, nausea and vomiting.   Genitourinary:  Negative for dysuria.   Musculoskeletal:  Negative for myalgias.   Skin:  Negative for rash.   Neurological:  Negative for headaches.   Endo/Heme/Allergies:  Negative for environmental allergies.   Psychiatric/Behavioral:  Negative for suicidal ideas. The patient is not nervous/anxious.      Past Medical History: has a past medical history of Basal cell carcinoma, BPH (benign prostatic hyperplasia), Carotid stenosis, Chronic kidney disease, Claudication, Coronary artery disease, DDD (degenerative disc disease), Diabetes mellitus with renal complications, Disc disease, degenerative, cervical, Encounter for blood transfusion, GERD (gastroesophageal reflux disease), Gout, chronic, History of ulcerative colitis, HLD (hyperlipidemia), HTN (hypertension), Ileostomy in place, RBBB, Squamous cell carcinoma, Squamous cell carcinoma, and Ventricular tachycardia.    Past Surgical History: has a past surgical history that includes colectomy and ileostomy (1985); Cervical fusion; Cataract extraction (Bilateral); cardiac stents; Transurethral resection of prostate (TURP) without use of laser (N/A, 1/23/2019); repair, hernia, parastomal (N/A, 11/9/2020); Lysis of adhesions (N/A, 11/9/2020); Excision of parotid gland (Left, 12/18/2020); Left heart catheterization (Right, 4/15/2021); Insertion of implantable loop recorder (06/07/2021); Insertion of implantable loop recorder (Left, 6/7/2021); Pouchoscopy (N/A, 4/6/2022); Endoscopic ultrasound of upper gastrointestinal tract (N/A, 2/10/2023); ERCP (N/A, 2/10/2023); Cholecystectomy (N/A, 2/14/2023); and Lysis of adhesions (N/A, 2/14/2023).    Family History:family history includes Cancer in his father; Dementia in his mother; Diabetes in his father.    Allergies:   Review  of patient's allergies indicates:   Allergen Reactions    Crestor [rosuvastatin]     Lipitor [atorvastatin]        Social History: reports that he has never smoked. He has never used smokeless tobacco. He reports that he does not drink alcohol and does not use drugs.    Home medications:   Current Outpatient Medications on File Prior to Visit   Medication Sig Dispense Refill    ACCU-CHEK PAUL CONTROL SOLN Soln 1 drop by NOT APPLICABLE route once daily.      ACCU-CHEK SOFTCLIX LANCETS Misc Accucheck tid 200 each 1    allopurinoL (ZYLOPRIM) 300 MG tablet Take 1.5 tablets (450 mg total) by mouth once daily. 135 tablet 3    amiodarone (PACERONE) 200 MG Tab Take 1 tablet (200 mg total) by mouth once daily.      aspirin (ECOTRIN) 81 MG EC tablet Take 81 mg by mouth once daily.      BD ALCOHOL SWABS PadM       blood sugar diagnostic (ONETOUCH ULTRA TEST) Strp USE ONE STRIP TO CHECK GLUCOSE EVERY  each 11    blood-glucose meter (ACCU-CHEK GUIDE GLUCOSE METER) [facility] Accucheck BID 1 each 0    brimonidine 0.2% (ALPHAGAN) 0.2 % Drop INSTILL 1 DROP INTO EACH EYE TWICE DAILY 20 mL 0    calcium citrate-vitamin D3 (CITRACAL + D MAXIMUM) 315 mg-6.25 mcg (250 unit) Tab Take 1 tablet by mouth once daily. 120 tablet 3    carvediloL (COREG) 6.25 MG tablet Take 1 tablet (6.25 mg total) by mouth 2 (two) times daily with meals. 180 tablet 0    droxidopa 100 mg Cap Take 1 capsule by mouth 2 (two) times a day. Take in morning and afternoon      FIBER, CALCIUM POLYCARBOPHIL, 625 mg tablet Take 3 tablets by mouth before dinner.      fludrocortisone (FLORINEF) 0.1 mg Tab Take 1 tablet (100 mcg total) by mouth once daily. 30 tablet 0    glimepiride (AMARYL) 4 MG tablet Take 4 mg by mouth 2 (two) times daily before meals.      midodrine (PROAMATINE) 10 MG tablet Take 1 tablet (10 mg total) by mouth daily as needed (Hypotension). 30 tablet 0    oxyCODONE (ROXICODONE) 5 MG immediate release tablet Take 1 tablet (5 mg total) by mouth every 4  (four) hours as needed for Pain. 12 tablet 0    oxyCODONE-acetaminophen (PERCOCET) 5-325 mg per tablet Take 1 tablet by mouth every 8 (eight) hours as needed for Pain. 10 tablet 0    pantoprazole (PROTONIX) 40 MG tablet Take 40 mg by mouth once daily.      polyethylene glycol (GLYCOLAX) 17 gram/dose powder Take 17 g by mouth 2 (two) times daily as needed (constipation). 510 g 0    pravastatin (PRAVACHOL) 80 MG tablet Take 1 tablet (80 mg total) by mouth once daily. 90 tablet 0     Current Facility-Administered Medications on File Prior to Visit   Medication Dose Route Frequency Provider Last Rate Last Admin    sodium chloride 0.9% in 1,000 mL infusion   Intravenous Continuous Paper Order           Vital signs:  There were no vitals taken for this visit.    Physical Exam  Vitals reviewed.   Constitutional:       General: He is not in acute distress.     Appearance: Normal appearance. He is not ill-appearing.   HENT:      Head: Normocephalic.   Cardiovascular:      Rate and Rhythm: Normal rate and regular rhythm.      Heart sounds: Normal heart sounds. No murmur heard.  Pulmonary:      Effort: Pulmonary effort is normal. No respiratory distress.      Breath sounds: Normal breath sounds.   Chest:      Chest wall: No tenderness.   Abdominal:      General: Bowel sounds are normal. There is no distension.      Palpations: Abdomen is soft.      Tenderness: There is no abdominal tenderness. Negative signs include Bowers's sign.      Hernia: No hernia is present.      Comments: Ileostomy with intact bag. Stoma beefy red. Small amount formed stool and liquid noted in bag. No hematochezia. Abdominal Incision C/D/I.   Skin:     General: Skin is warm.   Neurological:      Mental Status: He is alert and oriented to person, place, and time.   Psychiatric:         Mood and Affect: Mood normal.         Behavior: Behavior normal.       Routine labs:  Lab Results   Component Value Date    WBC 5.44 02/24/2023    HGB 10.5 (L) 02/24/2023     HCT 33.4 (L) 02/24/2023    MCV 95 02/24/2023     02/24/2023     Lab Results   Component Value Date    INR 1.0 12/15/2022     Lab Results   Component Value Date    IRON 144 03/17/2008    FERRITIN 381.2 (H) 03/17/2008     Lab Results   Component Value Date     02/24/2023    K 4.4 02/24/2023     02/24/2023    CO2 17 (L) 02/24/2023    BUN 31 (H) 02/24/2023    CREATININE 2.4 (H) 02/24/2023     Lab Results   Component Value Date    ALBUMIN 3.3 (L) 02/24/2023    ALT 45 (H) 02/24/2023    AST 58 (H) 02/24/2023    ALKPHOS 208 (H) 02/24/2023    BILITOT 0.8 02/24/2023     No results found for: GLUCOSE  Lab Results   Component Value Date    TSH 2.178 02/22/2023     Lab Results   Component Value Date    CALCIUM 8.9 02/24/2023    PHOS 4.2 01/30/2023       Imaging:  CT Abdomen Pelvis  Without Contrast  Narrative: EXAMINATION:  CT ABDOMEN PELVIS WITHOUT CONTRAST    CLINICAL HISTORY:  Abdominal abscess/infection suspected;    TECHNIQUE:  Low dose axial images, sagittal and coronal reformations were obtained from the lung bases to the pubic symphysis.    COMPARISON:  CT of the abdomen pelvis performed 04/04/2022.  MRI of the abdomen performed 02/08/2023.    FINDINGS:  This examination is limited due to lack of intravenous contrast.    Lower chest: Coronary artery and cardiac valvular calcifications.  Pleural thickening/scar, similar to prior.  Dependent atelectasis and scarring again noted at the lung bases.    Liver: Normal contour.    Gallbladder and bile ducts: Anticipated postop appearance status post recent cholecystectomy.  No definite drainable fluid collections identified within the surgical bed, noting limitations imposed by lack of intravenous contrast.  Ill-defined inflammatory changes are noted in the region.    Skin closure staples are noted in the anterior abdominal wall with subjacent induration of the subcutaneous fat planes, presumably related to recent surgery.  Increased attenuation is noted  within the subjacent right rectus abdominus musculature which could relate to hematoma or other postoperative collection.  This does not appear to acute in keeping with a subjacent intra-abdominal compartment.    Pancreas: Normal contour.    Spleen: Normal contour.    Adrenals: Normal contour.    Kidneys: Cortical atrophy of both kidneys.  Simple cysts and additional subcentimeter hypoattenuating lesions are noted, the latter of which are too small to definitely characterize.  Technically indeterminate exophytic 12 mm lesion projecting from lower pole left kidney.    Lymph nodes: No abdominal or pelvic lymphadenopathy.    Bowel and mesentery: Redemonstrated operative sequela of total colectomy with right anterior abdominal wall end ileostomy formation.  No evidence of bowel obstruction.  Nonspecific mildly increased attenuation of the mesenteric fat in the right lower quadrant adjoining several small bowel loops (for example as seen on series 3, image 176), relatively similar appearance to prior CT imaging performed 04/04/2022.    Abdominal aorta: Nonaneurysmal.  Moderate atherosclerotic calcification.    Inferior vena cava: Unremarkable.    Free fluid or free air: None.    Pelvis: Unremarkable.    Urinary bladder: Unremarkable.    Body wall: As above.  Remote operative sequela in the anterior abdominal wall in keeping with prior colectomy.  Bilateral fat containing inguinal hernias.    Bones: No definite acute abnormality.  Question osseous demineralization.  Degenerative findings are noted involving the spine, sacroiliac joints, pubic symphysis, and hip joints.  Impression: 1. Findings in keeping with reported recent history of cholecystectomy.  Noting limitations imposed by lack of intravenous contrast, no definite discrete drainable fluid collection within the cholecystectomy bed.  2. Skin closure staples are noted in the anterior abdominal wall with subjacent induration of the subcutaneous fat planes,  presumably related to recent surgery. Increased attenuation is noted within the subjacent right rectus abdominus musculature which could relate to hematoma or other postoperative collection. This does not appear to acute in keeping with a subjacent intra-abdominal compartment.  Correlation with physical exam recommended.  3. Nonspecific mildly increased attenuation of the mesenteric fat in the right lower quadrant adjoining several small bowel loops relatively similar appearance to prior CT imaging performed 04/04/2022.  Reactive change related to infectious or noninfectious inflammatory etiology could be considered.  4. Redemonstrated numerous renal lesions, some which appear indeterminate by current CT imaging.  Ultrasound performed 06/09/2022 demonstrated no solid renal mass.  5. Additional details, as provided in the body of the report.    Electronically signed by: Andrew Darby  Date:    02/24/2023  Time:    09:57      I have reviewed prior labs, imaging, and notes.      Assessment:  1. Hospital discharge follow-up    2. Ileostomy in place    3. Chronic diarrhea        Plan:     Continue Imodium as previously prescribed.   Continue Daily Fiber  If pain does not continue to improve or worsens, consider LFTs  If ostomy output increases, consider questran.     Plan of care discussed with patient who is in agreement and verbalized understanding.     I have explained the planned procedures to the patient.The risks, benefits and alternatives of the procedure were also explained in detail. Patient verbalized understanding, all questions were answered. The patient agrees to proceed as planned              Mia Montenegro, MEGHA,FNP-BC  [facility]    (Portions of this note were dictated using voice recognition software and may contain dictation related errors in spelling/grammar/syntax not found on text review)

## 2023-03-02 ENCOUNTER — INFUSION (OUTPATIENT)
Dept: INFECTIOUS DISEASES | Facility: HOSPITAL | Age: 73
End: 2023-03-02
Attending: INTERNAL MEDICINE
Payer: MEDICARE

## 2023-03-02 ENCOUNTER — OFFICE VISIT (OUTPATIENT)
Dept: CARDIOLOGY | Facility: CLINIC | Age: 73
End: 2023-03-02
Payer: MEDICARE

## 2023-03-02 ENCOUNTER — OFFICE VISIT (OUTPATIENT)
Dept: NEUROLOGY | Facility: CLINIC | Age: 73
End: 2023-03-02
Payer: MEDICARE

## 2023-03-02 ENCOUNTER — PATIENT MESSAGE (OUTPATIENT)
Dept: GASTROENTEROLOGY | Facility: CLINIC | Age: 73
End: 2023-03-02
Payer: MEDICARE

## 2023-03-02 VITALS
SYSTOLIC BLOOD PRESSURE: 153 MMHG | HEART RATE: 74 BPM | BODY MASS INDEX: 27.09 KG/M2 | DIASTOLIC BLOOD PRESSURE: 78 MMHG | HEIGHT: 74 IN | WEIGHT: 211.06 LBS

## 2023-03-02 VITALS
HEART RATE: 90 BPM | OXYGEN SATURATION: 97 % | DIASTOLIC BLOOD PRESSURE: 90 MMHG | SYSTOLIC BLOOD PRESSURE: 162 MMHG | BODY MASS INDEX: 26.77 KG/M2 | WEIGHT: 208.56 LBS | HEIGHT: 74 IN

## 2023-03-02 VITALS
OXYGEN SATURATION: 97 % | TEMPERATURE: 97 F | DIASTOLIC BLOOD PRESSURE: 91 MMHG | BODY MASS INDEX: 26.83 KG/M2 | HEART RATE: 101 BPM | WEIGHT: 209 LBS | SYSTOLIC BLOOD PRESSURE: 169 MMHG

## 2023-03-02 DIAGNOSIS — K52.9 CHRONIC DIARRHEA: Primary | ICD-10-CM

## 2023-03-02 DIAGNOSIS — I10 ESSENTIAL HYPERTENSION: ICD-10-CM

## 2023-03-02 DIAGNOSIS — K52.9 CHRONIC DIARRHEA: ICD-10-CM

## 2023-03-02 DIAGNOSIS — E86.0 DEHYDRATION: Primary | ICD-10-CM

## 2023-03-02 DIAGNOSIS — I49.8 OTHER CARDIAC ARRHYTHMIA: Primary | ICD-10-CM

## 2023-03-02 DIAGNOSIS — G90.9 AUTONOMIC NEUROPATHY: ICD-10-CM

## 2023-03-02 DIAGNOSIS — N18.32 TYPE 2 DIABETES MELLITUS WITH STAGE 3B CHRONIC KIDNEY DISEASE, WITHOUT LONG-TERM CURRENT USE OF INSULIN: ICD-10-CM

## 2023-03-02 DIAGNOSIS — I47.20 VT (VENTRICULAR TACHYCARDIA): ICD-10-CM

## 2023-03-02 DIAGNOSIS — R09.89 CAROTID BRUIT, UNSPECIFIED LATERALITY: Primary | ICD-10-CM

## 2023-03-02 DIAGNOSIS — Z93.3 COLOSTOMY PRESENT: ICD-10-CM

## 2023-03-02 DIAGNOSIS — E11.22 TYPE 2 DIABETES MELLITUS WITH STAGE 3B CHRONIC KIDNEY DISEASE, WITHOUT LONG-TERM CURRENT USE OF INSULIN: ICD-10-CM

## 2023-03-02 PROCEDURE — 1159F PR MEDICATION LIST DOCUMENTED IN MEDICAL RECORD: ICD-10-PCS | Mod: CPTII,S$GLB,, | Performed by: PSYCHIATRY & NEUROLOGY

## 2023-03-02 PROCEDURE — 3288F FALL RISK ASSESSMENT DOCD: CPT | Mod: CPTII,S$GLB,, | Performed by: INTERNAL MEDICINE

## 2023-03-02 PROCEDURE — 1111F PR DISCHARGE MEDS RECONCILED W/ CURRENT OUTPATIENT MED LIST: ICD-10-PCS | Mod: CPTII,S$GLB,, | Performed by: PSYCHIATRY & NEUROLOGY

## 2023-03-02 PROCEDURE — 1101F PR PT FALLS ASSESS DOC 0-1 FALLS W/OUT INJ PAST YR: ICD-10-PCS | Mod: CPTII,S$GLB,, | Performed by: INTERNAL MEDICINE

## 2023-03-02 PROCEDURE — 3008F PR BODY MASS INDEX (BMI) DOCUMENTED: ICD-10-PCS | Mod: CPTII,S$GLB,, | Performed by: PSYCHIATRY & NEUROLOGY

## 2023-03-02 PROCEDURE — 99999 PR PBB SHADOW E&M-EST. PATIENT-LVL IV: ICD-10-PCS | Mod: PBBFAC,,, | Performed by: PSYCHIATRY & NEUROLOGY

## 2023-03-02 PROCEDURE — 3288F PR FALLS RISK ASSESSMENT DOCUMENTED: ICD-10-PCS | Mod: CPTII,S$GLB,, | Performed by: INTERNAL MEDICINE

## 2023-03-02 PROCEDURE — 3078F DIAST BP <80 MM HG: CPT | Mod: CPTII,S$GLB,, | Performed by: PSYCHIATRY & NEUROLOGY

## 2023-03-02 PROCEDURE — 3288F FALL RISK ASSESSMENT DOCD: CPT | Mod: CPTII,S$GLB,, | Performed by: PSYCHIATRY & NEUROLOGY

## 2023-03-02 PROCEDURE — 3080F DIAST BP >= 90 MM HG: CPT | Mod: CPTII,S$GLB,, | Performed by: INTERNAL MEDICINE

## 2023-03-02 PROCEDURE — 99999 PR PBB SHADOW E&M-EST. PATIENT-LVL V: CPT | Mod: PBBFAC,HCNC,, | Performed by: INTERNAL MEDICINE

## 2023-03-02 PROCEDURE — 3044F PR MOST RECENT HEMOGLOBIN A1C LEVEL <7.0%: ICD-10-PCS | Mod: CPTII,S$GLB,, | Performed by: INTERNAL MEDICINE

## 2023-03-02 PROCEDURE — 3077F SYST BP >= 140 MM HG: CPT | Mod: CPTII,S$GLB,, | Performed by: INTERNAL MEDICINE

## 2023-03-02 PROCEDURE — 99999 PR PBB SHADOW E&M-EST. PATIENT-LVL V: ICD-10-PCS | Mod: PBBFAC,HCNC,, | Performed by: INTERNAL MEDICINE

## 2023-03-02 PROCEDURE — 1159F MED LIST DOCD IN RCRD: CPT | Mod: CPTII,S$GLB,, | Performed by: INTERNAL MEDICINE

## 2023-03-02 PROCEDURE — 1111F DSCHRG MED/CURRENT MED MERGE: CPT | Mod: CPTII,S$GLB,, | Performed by: INTERNAL MEDICINE

## 2023-03-02 PROCEDURE — 1125F PR PAIN SEVERITY QUANTIFIED, PAIN PRESENT: ICD-10-PCS | Mod: CPTII,S$GLB,, | Performed by: PSYCHIATRY & NEUROLOGY

## 2023-03-02 PROCEDURE — 1159F MED LIST DOCD IN RCRD: CPT | Mod: CPTII,S$GLB,, | Performed by: PSYCHIATRY & NEUROLOGY

## 2023-03-02 PROCEDURE — 3078F PR MOST RECENT DIASTOLIC BLOOD PRESSURE < 80 MM HG: ICD-10-PCS | Mod: CPTII,S$GLB,, | Performed by: PSYCHIATRY & NEUROLOGY

## 2023-03-02 PROCEDURE — 1101F PR PT FALLS ASSESS DOC 0-1 FALLS W/OUT INJ PAST YR: ICD-10-PCS | Mod: CPTII,S$GLB,, | Performed by: PSYCHIATRY & NEUROLOGY

## 2023-03-02 PROCEDURE — 99214 PR OFFICE/OUTPT VISIT, EST, LEVL IV, 30-39 MIN: ICD-10-PCS | Mod: S$GLB,,, | Performed by: INTERNAL MEDICINE

## 2023-03-02 PROCEDURE — 3077F PR MOST RECENT SYSTOLIC BLOOD PRESSURE >= 140 MM HG: ICD-10-PCS | Mod: CPTII,S$GLB,, | Performed by: INTERNAL MEDICINE

## 2023-03-02 PROCEDURE — 99999 PR PBB SHADOW E&M-EST. PATIENT-LVL IV: CPT | Mod: PBBFAC,,, | Performed by: PSYCHIATRY & NEUROLOGY

## 2023-03-02 PROCEDURE — 3008F PR BODY MASS INDEX (BMI) DOCUMENTED: ICD-10-PCS | Mod: CPTII,S$GLB,, | Performed by: INTERNAL MEDICINE

## 2023-03-02 PROCEDURE — 1125F PR PAIN SEVERITY QUANTIFIED, PAIN PRESENT: ICD-10-PCS | Mod: CPTII,S$GLB,, | Performed by: INTERNAL MEDICINE

## 2023-03-02 PROCEDURE — 3080F PR MOST RECENT DIASTOLIC BLOOD PRESSURE >= 90 MM HG: ICD-10-PCS | Mod: CPTII,S$GLB,, | Performed by: INTERNAL MEDICINE

## 2023-03-02 PROCEDURE — 3008F BODY MASS INDEX DOCD: CPT | Mod: CPTII,S$GLB,, | Performed by: PSYCHIATRY & NEUROLOGY

## 2023-03-02 PROCEDURE — 1125F AMNT PAIN NOTED PAIN PRSNT: CPT | Mod: CPTII,S$GLB,, | Performed by: PSYCHIATRY & NEUROLOGY

## 2023-03-02 PROCEDURE — 1160F PR REVIEW ALL MEDS BY PRESCRIBER/CLIN PHARMACIST DOCUMENTED: ICD-10-PCS | Mod: CPTII,S$GLB,, | Performed by: INTERNAL MEDICINE

## 2023-03-02 PROCEDURE — 3044F HG A1C LEVEL LT 7.0%: CPT | Mod: CPTII,S$GLB,, | Performed by: INTERNAL MEDICINE

## 2023-03-02 PROCEDURE — 1111F PR DISCHARGE MEDS RECONCILED W/ CURRENT OUTPATIENT MED LIST: ICD-10-PCS | Mod: CPTII,S$GLB,, | Performed by: INTERNAL MEDICINE

## 2023-03-02 PROCEDURE — 96360 HYDRATION IV INFUSION INIT: CPT | Mod: HCNC

## 2023-03-02 PROCEDURE — 1125F AMNT PAIN NOTED PAIN PRSNT: CPT | Mod: CPTII,S$GLB,, | Performed by: INTERNAL MEDICINE

## 2023-03-02 PROCEDURE — 1101F PT FALLS ASSESS-DOCD LE1/YR: CPT | Mod: CPTII,S$GLB,, | Performed by: PSYCHIATRY & NEUROLOGY

## 2023-03-02 PROCEDURE — 3008F BODY MASS INDEX DOCD: CPT | Mod: CPTII,S$GLB,, | Performed by: INTERNAL MEDICINE

## 2023-03-02 PROCEDURE — 25000003 PHARM REV CODE 250: Mod: HCNC | Performed by: NURSE PRACTITIONER

## 2023-03-02 PROCEDURE — 3288F PR FALLS RISK ASSESSMENT DOCUMENTED: ICD-10-PCS | Mod: CPTII,S$GLB,, | Performed by: PSYCHIATRY & NEUROLOGY

## 2023-03-02 PROCEDURE — 1160F RVW MEDS BY RX/DR IN RCRD: CPT | Mod: CPTII,S$GLB,, | Performed by: INTERNAL MEDICINE

## 2023-03-02 PROCEDURE — 3044F HG A1C LEVEL LT 7.0%: CPT | Mod: CPTII,S$GLB,, | Performed by: PSYCHIATRY & NEUROLOGY

## 2023-03-02 PROCEDURE — 99214 PR OFFICE/OUTPT VISIT, EST, LEVL IV, 30-39 MIN: ICD-10-PCS | Mod: S$GLB,,, | Performed by: PSYCHIATRY & NEUROLOGY

## 2023-03-02 PROCEDURE — 99214 OFFICE O/P EST MOD 30 MIN: CPT | Mod: S$GLB,,, | Performed by: PSYCHIATRY & NEUROLOGY

## 2023-03-02 PROCEDURE — 99499 UNLISTED E&M SERVICE: CPT | Mod: HCNC,S$GLB,, | Performed by: PSYCHIATRY & NEUROLOGY

## 2023-03-02 PROCEDURE — 3077F PR MOST RECENT SYSTOLIC BLOOD PRESSURE >= 140 MM HG: ICD-10-PCS | Mod: CPTII,S$GLB,, | Performed by: PSYCHIATRY & NEUROLOGY

## 2023-03-02 PROCEDURE — 1101F PT FALLS ASSESS-DOCD LE1/YR: CPT | Mod: CPTII,S$GLB,, | Performed by: INTERNAL MEDICINE

## 2023-03-02 PROCEDURE — 1111F DSCHRG MED/CURRENT MED MERGE: CPT | Mod: CPTII,S$GLB,, | Performed by: PSYCHIATRY & NEUROLOGY

## 2023-03-02 PROCEDURE — 3077F SYST BP >= 140 MM HG: CPT | Mod: CPTII,S$GLB,, | Performed by: PSYCHIATRY & NEUROLOGY

## 2023-03-02 PROCEDURE — 99214 OFFICE O/P EST MOD 30 MIN: CPT | Mod: S$GLB,,, | Performed by: INTERNAL MEDICINE

## 2023-03-02 PROCEDURE — 1159F PR MEDICATION LIST DOCUMENTED IN MEDICAL RECORD: ICD-10-PCS | Mod: CPTII,S$GLB,, | Performed by: INTERNAL MEDICINE

## 2023-03-02 PROCEDURE — 3044F PR MOST RECENT HEMOGLOBIN A1C LEVEL <7.0%: ICD-10-PCS | Mod: CPTII,S$GLB,, | Performed by: PSYCHIATRY & NEUROLOGY

## 2023-03-02 RX ORDER — CHOLESTYRAMINE 4 G/9G
4 POWDER, FOR SUSPENSION ORAL
Qty: 270 PACKET | Refills: 3 | Status: SHIPPED | OUTPATIENT
Start: 2023-03-02 | End: 2023-03-03 | Stop reason: SDUPTHER

## 2023-03-02 RX ORDER — METOPROLOL SUCCINATE 25 MG/1
25 TABLET, EXTENDED RELEASE ORAL DAILY
Qty: 30 TABLET | Refills: 11 | Status: SHIPPED | OUTPATIENT
Start: 2023-03-02 | End: 2023-03-20 | Stop reason: SDUPTHER

## 2023-03-02 RX ORDER — HYDRALAZINE HYDROCHLORIDE 10 MG/1
10 TABLET, FILM COATED ORAL 3 TIMES DAILY PRN
Qty: 90 TABLET | Refills: 1 | Status: SHIPPED | OUTPATIENT
Start: 2023-03-02 | End: 2023-04-24 | Stop reason: SDUPTHER

## 2023-03-02 RX ORDER — SODIUM CHLORIDE 0.9 % (FLUSH) 0.9 %
10 SYRINGE (ML) INJECTION
Status: CANCELLED | OUTPATIENT
Start: 2023-03-05

## 2023-03-02 RX ORDER — HEPARIN 100 UNIT/ML
500 SYRINGE INTRAVENOUS
Status: CANCELLED | OUTPATIENT
Start: 2023-03-05

## 2023-03-02 RX ADMIN — SODIUM CHLORIDE 1000 ML: 9 INJECTION, SOLUTION INTRAVENOUS at 12:03

## 2023-03-02 NOTE — PROGRESS NOTES
Pt received 1 liter NS x 1hr, pt states that while he was in the hospital, Bianka Arevalo NP stated that he could go back to 1 liter of NS weekly. Pt has appointment with Irina SAUNDERS next Friday and then us. We will schedule his future appointments accordingly.

## 2023-03-02 NOTE — PROGRESS NOTES
Jarrett Charlton Jr. is a 72 y.o. year old male that  presents for evaluation of postural hypotension and concern for autonomic dysfunction.    HPI:  Mr Charlton has HTN, HLD, DM, CKD, carotid stenosis, ulcerative colitis s/p colectomy and ileostomy, BPG s/p TURP, DJD, and postural hypotension.  He endorses several episodes of getting very dizzy and passing out as result of low blood pressure, often times documented in the low 70's by medics and ED staff.  Said that in between such episodes he will experience mild dizziness very seldom.  Reports frequent diarrhea but denies constipation, abdominal distension, palpitations, bladder changes, inability to sweat or diaphoresis, tremors, gait impairment, body rigidity, imbalance, falls, HA, vertigo, double vision, focal weakness or numbness, slurred speech, language or visual disturbances.  Has been on Midrodine and droxidopa as needed was not is helping and he was taking off the medication today.  Mr Charlton indicated that he saw the cardiologist today and some of his medications were adjusted.        Past Medical History:   Diagnosis Date    Basal cell carcinoma     BPH (benign prostatic hyperplasia)     s/p TURP    Carotid stenosis     Chronic kidney disease     Claudication     Coronary artery disease     DDD (degenerative disc disease) 10/21/2013    Diabetes mellitus with renal complications     Disc disease, degenerative, cervical     Encounter for blood transfusion     GERD (gastroesophageal reflux disease)     Gout, chronic     History of ulcerative colitis     s/p colectomy and ileostomy    HLD (hyperlipidemia)     HTN (hypertension)     Ileostomy in place 1982    RBBB     Squamous cell carcinoma 03/08/2018    Left superior helix near insertion    Squamous cell carcinoma 04/12/2018    Left forearm x 5    Ventricular tachycardia      Social History     Socioeconomic History    Marital status:     Number of children: 1   Occupational History    Occupation:   x 44 years     Comment: Retired    Occupation: [place]    Tobacco Use    Smoking status: Never    Smokeless tobacco: Never   Substance and Sexual Activity    Alcohol use: No    Drug use: No    Sexual activity: Not Currently     Partners: Female     Social Determinants of Health     Financial Resource Strain: Low Risk     Difficulty of Paying Living Expenses: Not hard at all   Food Insecurity: No Food Insecurity    Worried About Running Out of Food in the Last Year: Never true    Ran Out of Food in the Last Year: Never true   Transportation Needs: No Transportation Needs    Lack of Transportation (Medical): No    Lack of Transportation (Non-Medical): No   Physical Activity: Inactive    Days of Exercise per Week: 0 days    Minutes of Exercise per Session: 0 min   Stress: Stress Concern Present    Feeling of Stress : To some extent   Social Connections: Moderately Isolated    Frequency of Communication with Friends and Family: More than three times a week    Frequency of Social Gatherings with Friends and Family: More than three times a week    Attends [employer] Services: Never    Active Member of Clubs or Organizations: Yes    Attends Club or Organization Meetings: Never    Marital Status:    Housing Stability: Low Risk     Unable to Pay for Housing in the Last Year: No    Number of Places Lived in the Last Year: 1    Unstable Housing in the Last Year: No     Past Surgical History:   Procedure Laterality Date    cardiac stents      CATARACT EXTRACTION Bilateral     CERVICAL FUSION      CHOLECYSTECTOMY N/A 2/14/2023    Procedure: CHOLECYSTECTOMY;  Surgeon: Mike Joyce MD;  Location: [facility] OR;  Service: General;  Laterality: N/A;  very high probabilty of converison to open    colectomy and ileostomy  1985    ENDOSCOPIC ULTRASOUND OF UPPER GASTROINTESTINAL TRACT N/A 2/10/2023    Procedure: ULTRASOUND, UPPER GI TRACT, ENDOSCOPIC;  Surgeon: Poli Fuller MD;  Location: [facility] ENDO;   Service: Endoscopy;  Laterality: N/A;    ERCP N/A 2/10/2023    Procedure: ERCP (ENDOSCOPIC RETROGRADE CHOLANGIOPANCREATOGRAPHY);  Surgeon: Poli Fuller MD;  Location: [facility] ENDO;  Service: Endoscopy;  Laterality: N/A;    EXCISION OF PAROTID GLAND Left 12/18/2020    Procedure: EXCISION, PAROTID GLAND;  Surgeon: Michael Pinzon MD;  Location: [facility] OR 2ND FLR;  Service: ENT;  Laterality: Left;    INSERTION OF IMPLANTABLE LOOP RECORDER  06/07/2021    INSERTION OF IMPLANTABLE LOOP RECORDER Left 6/7/2021    Procedure: INSERTION, IMPLANTABLE LOOP RECORDER;  Surgeon: Romario Dao MD;  Location: [facility] CATH LAB;  Service: Cardiology;  Laterality: Left;    LEFT HEART CATHETERIZATION Right 4/15/2021    Procedure: CATHETERIZATION, HEART, LEFT;  Surgeon: Marcio Jones MD;  Location: [facility] CATH LAB;  Service: Cardiology;  Laterality: Right;    LYSIS OF ADHESIONS N/A 11/9/2020    Procedure: LYSIS, ADHESIONS,  ERAS low;  Surgeon: CED Haley MD;  Location: [facility] OR 2ND FLR;  Service: Colon and Rectal;  Laterality: N/A;    LYSIS OF ADHESIONS N/A 2/14/2023    Procedure: LYSIS, ADHESIONS;  Surgeon: Miek Joyce MD;  Location: [facility];  Service: General;  Laterality: N/A;    POUCHOSCOPY N/A 4/6/2022    Procedure: ENDOSCOPY, POUCH, SMALL INTESTINE, DIAGNOSTIC;  Surgeon: Dieter Juarez MD;  Location: [facility] (2ND FLR);  Service: Endoscopy;  Laterality: N/A;    REPAIR, HERNIA, PARASTOMAL N/A 11/9/2020    Procedure: REPAIR, HERNIA, PARASTOMAL;  Surgeon: CED Haley MD;  Location: [facility] OR 2ND FLR;  Service: Colon and Rectal;  Laterality: N/A;    TRANSURETHRAL RESECTION OF PROSTATE (TURP) WITHOUT USE OF LASER N/A 1/23/2019    Procedure: TURP, WITHOUT USING LASER BIPOLAR;  Surgeon: Catarino Mota MD;  Location: [facility] OR 1ST FLR;  Service: Urology;  Laterality: N/A;  1.5 HOURS     Family History   Problem Relation Age of Onset    Cancer Father     Diabetes Father     Dementia Mother     Melanoma Neg Hx      "Hypertension Neg Hx     Arthritis Neg Hx            Review of Systems  General ROS: negative for chills, fever or weight loss  Psychological ROS: negative for hallucination, depression or suicidal ideation  Ophthalmic ROS: negative for blurry vision, photophobia or eye pain  ENT ROS: negative for epistaxis, sore throat or rhinorrhea  Respiratory ROS: no cough, shortness of breath, or wheezing  Cardiovascular ROS: no chest pain or dyspnea on exertion  Gastrointestinal ROS: no abdominal pain, change in bowel habits, or black/ bloody stools  Genito-Urinary ROS: no dysuria, trouble voiding, or hematuria  Musculoskeletal ROS: negative for gait disturbance or muscular weakness  Neurological ROS: positive for syncope, no seizures; no ataxia  Dermatological ROS: negative for pruritis, rash and jaundice      Physical Exam:  BP (!) 153/78   Pulse 74   Ht 6' 2" (1.88 m)   Wt 95.8 kg (211 lb 1.5 oz)   BMI 27.10 kg/m²   General appearance: alert, cooperative, no distress  Constitutional:Oriented to person, place, and time.appears well-developed and well-nourished.   HEENT: Normocephalic, atraumatic, neck symmetrical, no nasal discharge   Eyes: conjunctivae/corneas clear, PERRL, EOM's intact  Lungs: clear to auscultation bilaterally, no dullness to percussion bilaterally  Heart: regular rate and rhythm without rub; no displacement of the PMI   Abdomen: soft, non-tender; bowel sounds normoactive; no organomegaly  Extremities: extremities symmetric; no clubbing, cyanosis, or edema  Integument: Skin color, texture, turgor normal; no rashes; hair distrubution normal  Neurologic:   Mental status: alert and awake, oriented x 4, comprehension, naming, and repetition intact. No right to left confusion. Performs serial 7's without difficulty .No dysarthria.  CN 2-12: pupils 4 mm bilaterally, reactive to light. Fundi without papilledema. Visual fields full to confrontation. EOM full without nystagmus. Face sensation normal in all " distributions. Face symmetric. Hearing grossly intact. Palate elevates well. Tongue midline without atrophy or fasciculations.  Motor: 5/5 all over  Sensory: intact in all modalities.  DTR's: 2+ all over.  Plantars: no tested.  Coordination: finger to nose and heel-knee-shin intact.  Gait: no ataxia or bradykinesia     LABS:    Complete Blood Count  Lab Results   Component Value Date    RBC 3.52 (L) 02/24/2023    HGB 10.5 (L) 02/24/2023    HCT 33.4 (L) 02/24/2023    MCV 95 02/24/2023    MCH 29.8 02/24/2023    MCHC 31.4 (L) 02/24/2023    RDW 15.2 (H) 02/24/2023     02/24/2023    MPV 10.0 02/24/2023    GRAN 3.9 02/24/2023    GRAN 70.9 02/24/2023    LYMPH 1.0 02/24/2023    LYMPH 18.9 02/24/2023    MONO 0.3 02/24/2023    MONO 5.3 02/24/2023    EOS 0.2 02/24/2023    BASO 0.04 02/24/2023    EOSINOPHIL 4.0 02/24/2023    BASOPHIL 0.7 02/24/2023    DIFFMETHOD Automated 02/24/2023       Comprehensive Metabolic Panel  Lab Results   Component Value Date     (H) 02/24/2023    BUN 31 (H) 02/24/2023    CREATININE 2.4 (H) 02/24/2023     02/24/2023    K 4.4 02/24/2023     02/24/2023    PROT 7.1 02/24/2023    ALBUMIN 3.3 (L) 02/24/2023    BILITOT 0.8 02/24/2023    AST 58 (H) 02/24/2023    ALKPHOS 208 (H) 02/24/2023    CO2 17 (L) 02/24/2023    ALT 45 (H) 02/24/2023    ANIONGAP 12 02/24/2023    EGFRNONAA 16.3 (A) 07/27/2022    ESTGFRAFRICA 18.9 (A) 07/27/2022       TSH  Lab Results   Component Value Date    TSH 2.178 02/22/2023         Assessment: 71 y/o with HTN, HLD, DM, CKD, carotid stenosis, remote ulcerative colitis s/p colectomy and ileostomy, BPG s/p TURP, DJD, presents for evaluation of possible neurogenic postural hypotension/autonomic dysfunction.  Neuro exam is unremarkable and he endorses no associated neurological symptoms except episodic dizziness and syncope.  His cardiac medications were adjusted today, thus would like to see how he does on this new regimen and reevaluate him in 6 weeks to  determine if we should embark on autonomic testing.          ICD-10-CM ICD-9-CM    1. Autonomic neuropathy  G90.9 337.9 Ambulatory referral/consult to Neurology        The encounter diagnosis was Autonomic neuropathy.      Plan:  1) Postural hypotension and syncope  2) HTN  3) HLD  4) DM  5) CKD  6) Carotid stenosis  7) Ulcerative colitis s/p colectomy and ileostomy  8) BPG s/p TURP,  9) DJD  No orders of the defined types were placed in this encounter.          Osmar Miramontes MD

## 2023-03-02 NOTE — PROGRESS NOTES
"[facility] - Cardiology [address]  Cardiology Clinic Note      Chief Complaint  Chief Complaint   Patient presents with    Follow-up       HPI:    72-year-old male with a past medical history SBO, s/p colostomy, GERD, parotid mass s/p resection, BPH & prostate CA s/p TURP, CKD 4, pleural plaque, ("neurogenic") orthostatic hypotension, hypertension, hyperlipidemia, DM2, carotid stenosis no ultrasound could be found, ascending thoracic aorta 4.4 cm CT 2018, lower extremity arterial duplex 09/2022 no evidence of hemodynamically significant stenosis nonvisualization of the peroneal arteries prior SAKSHI 2019 normal, CAD status post PCI to mid LAD 2017 followed by cardiac catheterization 2021 patent stent nonobstructive disease prior MPI 2018 inferolateral ischemia, sustained ventricular tachycardia documented on discharge summary 4/2021 on amiodarone previous loop recorder implantation 2021 presumed link transmission showed several episodes of ventricular tachycardia with longest episode 36 beats also documented either atrial fibrillation RVR with aberrant conduction or AV radha reentry tachycardia (this was documented by an outside provider note scan 03/2021), echo 12/2022 normal EF mild LVH diastolic function indeterminate normal RV PASP 32+ CVP which could not be determined ascending aorta mildly dilated    Last office visit discontinued Plavix continued aspirin    Telephone note 02/06/2023:  Episode of hypotension decreased Coreg referred to neurology for autonomic dysfunction    Subsequently on my last telephone note 02/22/2023:    "Hospitalized with cholestatic jaundice s/p cholecystectomy with lysis of adhesions 2/14  Statin held, coreg held for bradycardia, was given norvasc and PRN hydral  Ven[place]ie will be removed 2/28     Patient reports he is taking the following post dc:  Droxidopa 200 mg qd PRN (advised 100 BID PRN morning and afternoon)  Fludrocortisone mg 0.1 qd  Carvedilol 6.25 not taking regularly (PRN not " "ideal need to readdress at follow up not taking as directed but will probably replace with another agent such as hydral and potentially add toprol or dc BB based on outside cardiology records - may need for h/o VT?)  Midodrine 10 mg PRN qd  Amio 200 qd confirmed (not BID)  Medication list edited to to reflect above     Follow up with nephrology scheduled 3/10 (Cr up) has IVF twice weekly secondary to losses from ostomy  Staying hydrated no NSAIDs  Discussed hydration as Cr has increased on labs     Neurology appointment 2/28 suspect autonomic dysfunction with orthostatic component      States BP log okay and he has been taking above regimen for years     Will see me in cardiology clinic 3/2 to further address medication regimen now have last note from outside cardiology which states patient has pAF in note scan 2/3/2023"    Patient is seen Neurology today  Patient does not feel like droxidopa is helping so will take it off med medication list; he was only taking this medication p.r.n.     Medications  Current Outpatient Medications   Medication Sig Dispense Refill    ACCU-CHEK PAUL CONTROL SOLN Soln 1 drop by NOT APPLICABLE route once daily.      ACCU-CHEK SOFTCLIX LANCETS Misc Accucheck tid 200 each 1    allopurinoL (ZYLOPRIM) 300 MG tablet Take 1.5 tablets (450 mg total) by mouth once daily. 135 tablet 3    amiodarone (PACERONE) 200 MG Tab Take 1 tablet (200 mg total) by mouth once daily.      aspirin (ECOTRIN) 81 MG EC tablet Take 81 mg by mouth once daily.      BD ALCOHOL SWABS PadM       blood sugar diagnostic (ONETOUCH ULTRA TEST) Strp USE ONE STRIP TO CHECK GLUCOSE EVERY  each 11    blood-glucose meter (ACCU-CHEK GUIDE GLUCOSE METER) [facility] [place] Accucheck BID 1 each 0    brimonidine 0.2% (ALPHAGAN) 0.2 % Drop INSTILL 1 DROP INTO EACH EYE TWICE DAILY 20 mL 0    calcium citrate-vitamin D3 (CITRACAL + D MAXIMUM) 315 mg-6.25 mcg (250 unit) Tab Take 1 tablet by mouth once daily. 120 tablet 3    carvediloL (COREG) " 6.25 MG tablet Take 1 tablet (6.25 mg total) by mouth 2 (two) times daily with meals. 180 tablet 0    droxidopa 100 mg Cap Take 1 capsule by mouth 2 (two) times a day. Take in morning and afternoon      FIBER, CALCIUM POLYCARBOPHIL, 625 mg tablet Take 3 tablets by mouth before dinner.      glimepiride (AMARYL) 4 MG tablet Take 4 mg by mouth 2 (two) times daily before meals.      pantoprazole (PROTONIX) 40 MG tablet Take 40 mg by mouth once daily.      pravastatin (PRAVACHOL) 80 MG tablet Take 1 tablet (80 mg total) by mouth once daily. 90 tablet 0    fludrocortisone (FLORINEF) 0.1 mg Tab Take 1 tablet (100 mcg total) by mouth once daily. (Patient not taking: Reported on 3/2/2023) 30 tablet 0    midodrine (PROAMATINE) 10 MG tablet Take 1 tablet (10 mg total) by mouth daily as needed (Hypotension). (Patient not taking: Reported on 3/2/2023) 30 tablet 0    oxyCODONE (ROXICODONE) 5 MG immediate release tablet Take 1 tablet (5 mg total) by mouth every 4 (four) hours as needed for Pain. (Patient not taking: Reported on 3/2/2023) 12 tablet 0    oxyCODONE-acetaminophen (PERCOCET) 5-325 mg per tablet Take 1 tablet by mouth every 8 (eight) hours as needed for Pain. (Patient not taking: Reported on 3/2/2023) 10 tablet 0    polyethylene glycol (GLYCOLAX) 17 gram/dose powder Take 17 g by mouth 2 (two) times daily as needed (constipation). (Patient not taking: Reported on 3/2/2023) 510 g 0     No current facility-administered medications for this visit.     Facility-Administered Medications Ordered in Other Visits   Medication Dose Route Frequency Provider Last Rate Last Admin    sodium chloride 0.9% in 1,000 mL infusion   Intravenous Continuous Paper Order            History  Past Medical History:   Diagnosis Date    Basal cell carcinoma     BPH (benign prostatic hyperplasia)     s/p TURP    Carotid stenosis     Chronic kidney disease     Claudication     Coronary artery disease     DDD (degenerative disc disease) 10/21/2013     Diabetes mellitus with renal complications     Disc disease, degenerative, cervical     Encounter for blood transfusion     GERD (gastroesophageal reflux disease)     Gout, chronic     History of ulcerative colitis     s/p colectomy and ileostomy    HLD (hyperlipidemia)     HTN (hypertension)     Ileostomy in place 1982    RBBB     Squamous cell carcinoma 03/08/2018    Left superior helix near insertion    Squamous cell carcinoma 04/12/2018    Left forearm x 5    Ventricular tachycardia      Past Surgical History:   Procedure Laterality Date    cardiac stents      CATARACT EXTRACTION Bilateral     CERVICAL FUSION      CHOLECYSTECTOMY N/A 2/14/2023    Procedure: CHOLECYSTECTOMY;  Surgeon: Mike Joyce MD;  Location: [facility] OR;  Service: General;  Laterality: N/A;  very high probabilty of converison to open    colectomy and ileostomy  1985    ENDOSCOPIC ULTRASOUND OF UPPER GASTROINTESTINAL TRACT N/A 2/10/2023    Procedure: ULTRASOUND, UPPER GI TRACT, ENDOSCOPIC;  Surgeon: Poli Fuller MD;  Location: [facility];  Service: Endoscopy;  Laterality: N/A;    ERCP N/A 2/10/2023    Procedure: ERCP (ENDOSCOPIC RETROGRADE CHOLANGIOPANCREATOGRAPHY);  Surgeon: Poli Fuller MD;  Location: [facility];  Service: Endoscopy;  Laterality: N/A;    EXCISION OF PAROTID GLAND Left 12/18/2020    Procedure: EXCISION, PAROTID GLAND;  Surgeon: Michael Pinzon MD;  Location: [address];  Service: ENT;  Laterality: Left;    INSERTION OF IMPLANTABLE LOOP RECORDER  06/07/2021    INSERTION OF IMPLANTABLE LOOP RECORDER Left 6/7/2021    Procedure: INSERTION, IMPLANTABLE LOOP RECORDER;  Surgeon: Romario Dao MD;  [place] [facility] [place] 2[place] LAB;  Service: Cardiology;  Laterality: Left;    LEFT HEART CATHETERIZATION Right 4/15/2021    Procedure: CATHETERIZATION, HEART, LEFT;  Surgeon: Marcio Jones MD;  Location: [facility] [place] C[place] LAB;  Service: Cardiology;  Laterality: Right;    LYSIS OF ADHESIONS N/A 11/9/2020    Procedure: LYSIS, ADHESIONS,   ANUSHA adame;  Surgeon: CED Haley MD;  Location: [facility] OR 2ND FLR;  Service: Colon and Rectal;  Laterality: N/A;    LYSIS OF ADHESIONS N/A 2/14/2023    Procedure: LYSIS, ADHESIONS;  Surgeon: Mike Joyce MD;  Location: [facility];  Service: General;  Laterality: N/A;    POUCHOSCOPY N/A 4/6/2022    Procedure: ENDOSCOPY, POUCH, SMALL INTESTINE, DIAGNOSTIC;  Surgeon: Dieter Juarez MD;  Location: [facility] ENDO (2ND FLR);  Service: Endoscopy;  Laterality: N/A;    REPAIR, HERNIA, PARASTOMAL N/A 11/9/2020    Procedure: REPAIR, HERNIA, PARASTOMAL;  Surgeon: CED Haley MD;  Location: [facility] OR 2ND FLR;  Service: Colon and Rectal;  Laterality: N/A;    TRANSURETHRAL RESECTION OF PROSTATE (TURP) WITHOUT USE OF LASER N/A 1/23/2019    Procedure: TURP, WITHOUT USING LASER BIPOLAR;  Surgeon: Catarino Mota MD;  Location: [facility] OR 1ST FLR;  Service: Urology;  Laterality: N/A;  1.5 HOURS     Social History     Socioeconomic History    Marital status:     Number of children: 1   Occupational History    Occupation:  x 44 years     Comment: Retired    Occupation: [place]    Tobacco Use    Smoking status: Never    Smokeless tobacco: Never   Substance and Sexual Activity    Alcohol use: No    Drug use: No    Sexual activity: Not Currently     Partners: Female     Social Determinants of Health     Financial Resource Strain: Low Risk     Difficulty of Paying Living Expenses: Not hard at all   Food Insecurity: No Food Insecurity    Worried About Running Out of Food in the Last Year: Never true    Ran Out of Food in the Last Year: Never true   Transportation Needs: No Transportation Needs    Lack of Transportation (Medical): No    Lack of Transportation (Non-Medical): No   Physical Activity: Inactive    Days of Exercise per Week: 0 days    Minutes of Exercise per Session: 0 min   Stress: Stress Concern Present    Feeling of Stress : To some extent   Social Connections: Moderately Isolated     Frequency of Communication with Friends and Family: More than three times a week    Frequency of Social Gatherings with Friends and Family: More than three times a week    Attends Zoroastrian Services: Never    Active Member of Clubs or Organizations: Yes    Attends Club or Organization Meetings: Never    Marital Status:    Housing Stability: Low Risk     Unable to Pay for Housing in the Last Year: No    Number of Places Lived in the Last Year: 1    Unstable Housing in the Last Year: No     Family History   Problem Relation Age of Onset    Cancer Father     Diabetes Father     Dementia Mother     Melanoma Neg Hx     Hypertension Neg Hx     Arthritis Neg Hx         Allergies  Review of patient's allergies indicates:   Allergen Reactions    Crestor [rosuvastatin]     Lipitor [atorvastatin]        Review of Systems   Review of Systems   Constitutional: Negative for fever.   HENT:  Negative for nosebleeds.    Eyes:  Negative for visual disturbance.   Cardiovascular:  Negative for chest pain, claudication, dyspnea on exertion, palpitations and syncope.   Respiratory:  Negative for cough, hemoptysis and wheezing.    Endocrine: Negative for cold intolerance, heat intolerance, polyphagia and polyuria.   Hematologic/Lymphatic: Negative for bleeding problem.   Skin:  Negative for rash.   Musculoskeletal:  Negative for myalgias.   Gastrointestinal:  Negative for hematemesis, hematochezia, nausea and vomiting.   Genitourinary:  Negative for dysuria.   Neurological:  Negative for focal weakness and sensory change.   Psychiatric/Behavioral:  Negative for altered mental status.      Physical Exam  There were no vitals filed for this visit.    Wt Readings from Last 1 Encounters:   03/02/23 94.6 kg (208 lb 8.9 oz)     Physical Exam  HENT:      Head: Normocephalic and atraumatic.      Mouth/Throat:      Mouth: Mucous membranes are moist.   Eyes:      Extraocular Movements: Extraocular movements intact.      Pupils: Pupils are  equal, round, and reactive to light.   Neck:      Vascular: Carotid bruit present. No JVD.   Cardiovascular:      Rate and Rhythm: Normal rate and regular rhythm.      Pulses: Normal pulses and intact distal pulses.      Heart sounds: Normal heart sounds. No murmur heard.    No friction rub. No gallop.   Pulmonary:      Effort: Pulmonary effort is normal.      Breath sounds: Normal breath sounds.   Abdominal:      Tenderness: There is no abdominal tenderness. There is no guarding or rebound.   Musculoskeletal:      Right lower leg: No edema.      Left lower leg: No edema.   Skin:     General: Skin is warm and dry.      Capillary Refill: Capillary refill takes less than 2 seconds.   Neurological:      General: No focal deficit present.      Mental Status: He is alert and oriented to person, place, and time.   Psychiatric:         Mood and Affect: Mood normal.       Labs  Admission on 02/24/2023, Discharged on 02/24/2023   Component Date Value Ref Range Status    WBC 02/24/2023 5.44  3.90 - 12.70 K/uL Final    RBC 02/24/2023 3.52 (L)  4.60 - 6.20 M/uL Final    Hemoglobin 02/24/2023 10.5 (L)  14.0 - 18.0 g/dL Final    Hematocrit 02/24/2023 33.4 (L)  40.0 - 54.0 % Final    MCV 02/24/2023 95  82 - 98 fL Final    MCH 02/24/2023 29.8  27.0 - 31.0 pg Final    MCHC 02/24/2023 31.4 (L)  32.0 - 36.0 g/dL Final    RDW 02/24/2023 15.2 (H)  11.5 - 14.5 % Final    Platelets 02/24/2023 320  150 - 450 K/uL Final    MPV 02/24/2023 10.0  9.2 - 12.9 fL Final    Immature Granulocytes 02/24/2023 0.2  0.0 - 0.5 % Final    Gran # (ANC) 02/24/2023 3.9  1.8 - 7.7 K/uL Final    Immature Grans (Abs) 02/24/2023 0.01  0.00 - 0.04 K/uL Final    Comment: Mild elevation in immature granulocytes is non specific and   can be seen in a variety of conditions including stress response,   acute inflammation, trauma and pregnancy. Correlation with other   laboratory and clinical findings is essential.      Lymph # 02/24/2023 1.0  1.0 - 4.8 K/uL Final     Mono # 02/24/2023 0.3  0.3 - 1.0 K/uL Final    Eos # 02/24/2023 0.2  0.0 - 0.5 K/uL Final    Baso # 02/24/2023 0.04  0.00 - 0.20 K/uL Final    nRBC 02/24/2023 0  0 /100 WBC Final    Gran % 02/24/2023 70.9  38.0 - 73.0 % Final    Lymph % 02/24/2023 18.9  18.0 - 48.0 % Final    Mono % 02/24/2023 5.3  4.0 - 15.0 % Final    Eosinophil % 02/24/2023 4.0  0.0 - 8.0 % Final    Basophil % 02/24/2023 0.7  0.0 - 1.9 % Final    Differential Method 02/24/2023 Automated   Final    Sodium 02/24/2023 139  136 - 145 mmol/L Final    Potassium 02/24/2023 4.4  3.5 - 5.1 mmol/L Final    Chloride 02/24/2023 110  95 - 110 mmol/L Final    CO2 02/24/2023 17 (L)  23 - 29 mmol/L Final    Glucose 02/24/2023 159 (H)  70 - 110 mg/dL Final    BUN 02/24/2023 31 (H)  8 - 23 mg/dL Final    Creatinine 02/24/2023 2.4 (H)  0.5 - 1.4 mg/dL Final    Calcium 02/24/2023 8.9  8.7 - 10.5 mg/dL Final    Total Protein 02/24/2023 7.1  6.0 - 8.4 g/dL Final    Albumin 02/24/2023 3.3 (L)  3.5 - 5.2 g/dL Final    Total Bilirubin 02/24/2023 0.8  0.1 - 1.0 mg/dL Final    Comment: For infants and newborns, interpretation of results should be based  on gestational age, weight and in agreement with clinical  observations.    Premature Infant recommended reference ranges:  Up to 24 hours.............<8.0 mg/dL  Up to 48 hours............<12.0 mg/dL  3-5 days..................<15.0 mg/dL  6-29 days.................<15.0 mg/dL      Alkaline Phosphatase 02/24/2023 208 (H)  55 - 135 U/L Final    AST 02/24/2023 58 (H)  10 - 40 U/L Final    ALT 02/24/2023 45 (H)  10 - 44 U/L Final    Anion Gap 02/24/2023 12  8 - 16 mmol/L Final    eGFR 02/24/2023 27.8 (A)  >60 mL/min/1.73 m^2 Final    Lipase 02/24/2023 327 (H)  4 - 60 U/L Final    Specimen UA 02/24/2023 Urine, Clean Catch   Final    Color, UA 02/24/2023 Colorless (A)  Yellow, Straw, Stacy Final    Appearance, UA 02/24/2023 Clear  Clear Final    pH, UA 02/24/2023 5.0  5.0 - 8.0 Final    Specific Gravity, UA 02/24/2023 1.010   1.005 - 1.030 Final    Protein, UA 02/24/2023 Negative  Negative Final    Comment: Recommend a 24 hour urine protein or a urine   protein/creatinine ratio if globulin induced proteinuria is  clinically suspected.      Glucose, UA 02/24/2023 Negative  Negative Final    Ketones, UA 02/24/2023 Negative  Negative Final    Bilirubin (UA) 02/24/2023 Negative  Negative Final    Occult Blood UA 02/24/2023 Negative  Negative Final    Nitrite, UA 02/24/2023 Negative  Negative Final    Urobilinogen, UA 02/24/2023 Negative  Negative EU/dL Final    Leukocytes, UA 02/24/2023 Negative  Negative Final    Troponin I 02/24/2023 0.006  0.000 - 0.026 ng/mL Final    Comment: The reference interval for Troponin I represents the 99th percentile   cutoff   for our facility and is consistent with 3rd generation assay   performance.      Lactate (Lactic Acid) 02/24/2023 1.8  0.5 - 2.2 mmol/L Final    Comment: Falsely low lactic acid results can be found in samples   containing >=13.0 mg/dL total bilirubin and/or >=3.5 mg/dL   direct bilirubin.      Blood Culture, Routine 02/24/2023 No growth after 5 days.   Final    Blood Culture, Routine 02/24/2023 No growth after 5 days.   Final    Group & Rh 02/24/2023 A POS   Final    Indirect Servando 02/24/2023 NEG   Final   Lab Visit on 02/22/2023   Component Date Value Ref Range Status    Sodium 02/22/2023 144  136 - 145 mmol/L Final    Potassium 02/22/2023 4.6  3.5 - 5.1 mmol/L Final    Chloride 02/22/2023 111 (H)  95 - 110 mmol/L Final    CO2 02/22/2023 22 (L)  23 - 29 mmol/L Final    Glucose 02/22/2023 109  70 - 110 mg/dL Final    BUN 02/22/2023 32 (H)  8 - 23 mg/dL Final    Creatinine 02/22/2023 2.7 (H)  0.5 - 1.4 mg/dL Final    Calcium 02/22/2023 9.1  8.7 - 10.5 mg/dL Final    Total Protein 02/22/2023 7.0  6.0 - 8.4 g/dL Final    Albumin 02/22/2023 3.3 (L)  3.5 - 5.2 g/dL Final    Total Bilirubin 02/22/2023 0.9  0.1 - 1.0 mg/dL Final    Comment: For infants and newborns, interpretation of results  should be based  on gestational age, weight and in agreement with clinical  observations.    Premature Infant recommended reference ranges:  Up to 24 hours.............<8.0 mg/dL  Up to 48 hours............<12.0 mg/dL  3-5 days..................<15.0 mg/dL  6-29 days.................<15.0 mg/dL      Alkaline Phosphatase 02/22/2023 208 (H)  55 - 135 U/L Final    AST 02/22/2023 46 (H)  10 - 40 U/L Final    ALT 02/22/2023 44  10 - 44 U/L Final    Anion Gap 02/22/2023 11  8 - 16 mmol/L Final    eGFR 02/22/2023 24.1 (A)  >60 mL/min/1.73 m^2 Final    WBC 02/22/2023 6.84  3.90 - 12.70 K/uL Final    RBC 02/22/2023 3.58 (L)  4.60 - 6.20 M/uL Final    Hemoglobin 02/22/2023 10.5 (L)  14.0 - 18.0 g/dL Final    Hematocrit 02/22/2023 34.3 (L)  40.0 - 54.0 % Final    MCV 02/22/2023 96  82 - 98 fL Final    MCH 02/22/2023 29.3  27.0 - 31.0 pg Final    MCHC 02/22/2023 30.6 (L)  32.0 - 36.0 g/dL Final    RDW 02/22/2023 15.3 (H)  11.5 - 14.5 % Final    Platelets 02/22/2023 322  150 - 450 K/uL Final    MPV 02/22/2023 10.3  9.2 - 12.9 fL Final    Immature Granulocytes 02/22/2023 0.6 (H)  0.0 - 0.5 % Final    Gran # (ANC) 02/22/2023 5.1  1.8 - 7.7 K/uL Final    Immature Grans (Abs) 02/22/2023 0.04  0.00 - 0.04 K/uL Final    Comment: Mild elevation in immature granulocytes is non specific and   can be seen in a variety of conditions including stress response,   acute inflammation, trauma and pregnancy. Correlation with other   laboratory and clinical findings is essential.      Lymph # 02/22/2023 1.2  1.0 - 4.8 K/uL Final    Mono # 02/22/2023 0.3  0.3 - 1.0 K/uL Final    Eos # 02/22/2023 0.2  0.0 - 0.5 K/uL Final    Baso # 02/22/2023 0.03  0.00 - 0.20 K/uL Final    nRBC 02/22/2023 0  0 /100 WBC Final    Gran % 02/22/2023 74.4 (H)  38.0 - 73.0 % Final    Lymph % 02/22/2023 17.8 (L)  18.0 - 48.0 % Final    Mono % 02/22/2023 4.2  4.0 - 15.0 % Final    Eosinophil % 02/22/2023 2.6  0.0 - 8.0 % Final    Basophil % 02/22/2023 0.4  0.0 - 1.9 %  Final    Differential Method 02/22/2023 Automated   Final    TSH 02/22/2023 2.178  0.400 - 4.000 uIU/mL Final    Hemoglobin A1C 02/22/2023 6.6 (H)  4.0 - 5.6 % Final    Comment: ADA Screening Guidelines:  5.7-6.4%  Consistent with prediabetes  >or=6.5%  Consistent with diabetes    High levels of fetal hemoglobin interfere with the HbA1C  assay. Heterozygous hemoglobin variants (HbS, HgC, etc)do  not significantly interfere with this assay.   However, presence of multiple variants may affect accuracy.      Estimated Avg Glucose 02/22/2023 143 (H)  68 - 131 mg/dL Final   No results displayed because visit has over 200 results.      Admission on 02/08/2023, Discharged on 02/09/2023   Component Date Value Ref Range Status    WBC 02/08/2023 7.22  3.90 - 12.70 K/uL Final    RBC 02/08/2023 4.27 (L)  4.60 - 6.20 M/uL Final    Hemoglobin 02/08/2023 12.6 (L)  14.0 - 18.0 g/dL Final    Hematocrit 02/08/2023 38.4 (L)  40.0 - 54.0 % Final    MCV 02/08/2023 90  82 - 98 fL Final    MCH 02/08/2023 29.5  27.0 - 31.0 pg Final    MCHC 02/08/2023 32.8  32.0 - 36.0 g/dL Final    RDW 02/08/2023 14.5  11.5 - 14.5 % Final    Platelets 02/08/2023 150  150 - 450 K/uL Final    MPV 02/08/2023 11.6  9.2 - 12.9 fL Final    Immature Granulocytes 02/08/2023 0.1  0.0 - 0.5 % Final    Gran # (ANC) 02/08/2023 6.7  1.8 - 7.7 K/uL Final    Immature Grans (Abs) 02/08/2023 0.01  0.00 - 0.04 K/uL Final    Comment: Mild elevation in immature granulocytes is non specific and   can be seen in a variety of conditions including stress response,   acute inflammation, trauma and pregnancy. Correlation with other   laboratory and clinical findings is essential.      Lymph # 02/08/2023 0.2 (L)  1.0 - 4.8 K/uL Final    Mono # 02/08/2023 0.2 (L)  0.3 - 1.0 K/uL Final    Eos # 02/08/2023 0.0  0.0 - 0.5 K/uL Final    Baso # 02/08/2023 0.01  0.00 - 0.20 K/uL Final    nRBC 02/08/2023 0  0 /100 WBC Final    Gran % 02/08/2023 93.2 (H)  38.0 - 73.0 % Final    Lymph %  02/08/2023 3.2 (L)  18.0 - 48.0 % Final    Mono % 02/08/2023 3.3 (L)  4.0 - 15.0 % Final    Eosinophil % 02/08/2023 0.1  0.0 - 8.0 % Final    Basophil % 02/08/2023 0.1  0.0 - 1.9 % Final    Differential Method 02/08/2023 Automated   Final    Sodium 02/08/2023 136  136 - 145 mmol/L Final    Potassium 02/08/2023 4.2  3.5 - 5.1 mmol/L Final    Chloride 02/08/2023 104  95 - 110 mmol/L Final    CO2 02/08/2023 18 (L)  23 - 29 mmol/L Final    Glucose 02/08/2023 233 (H)  70 - 110 mg/dL Final    BUN 02/08/2023 40 (H)  8 - 23 mg/dL Final    Creatinine 02/08/2023 3.1 (H)  0.5 - 1.4 mg/dL Final    Calcium 02/08/2023 8.8  8.7 - 10.5 mg/dL Final    Total Protein 02/08/2023 7.1  6.0 - 8.4 g/dL Final    Albumin 02/08/2023 3.4 (L)  3.5 - 5.2 g/dL Final    Total Bilirubin 02/08/2023 2.0 (H)  0.1 - 1.0 mg/dL Final    Comment: For infants and newborns, interpretation of results should be based  on gestational age, weight and in agreement with clinical  observations.    Premature Infant recommended reference ranges:  Up to 24 hours.............<8.0 mg/dL  Up to 48 hours............<12.0 mg/dL  3-5 days..................<15.0 mg/dL  6-29 days.................<15.0 mg/dL      Alkaline Phosphatase 02/08/2023 336 (H)  55 - 135 U/L Final    AST 02/08/2023 990 (H)  10 - 40 U/L Final    ALT 02/08/2023 571 (H)  10 - 44 U/L Final    Anion Gap 02/08/2023 14  8 - 16 mmol/L Final    eGFR 02/08/2023 20.4 (A)  >60 mL/min/1.73 m^2 Final    Troponin I 02/08/2023 <0.006  0.000 - 0.026 ng/mL Final    Comment: The reference interval for Troponin I represents the 99th percentile   cutoff   for our facility and is consistent with 3rd generation assay   performance.      Lipase 02/08/2023 81 (H)  4 - 60 U/L Final    BNP 02/08/2023 90  0 - 99 pg/mL Final    Values of less than 100 pg/ml are consistent with non-CHF populations.    POCT Glucose 02/08/2023 178 (H)  70 - 110 mg/dL Final    Sodium 02/09/2023 140  136 - 145 mmol/L Final    Potassium 02/09/2023 3.7   3.5 - 5.1 mmol/L Final    Chloride 02/09/2023 108  95 - 110 mmol/L Final    CO2 02/09/2023 20 (L)  23 - 29 mmol/L Final    Glucose 02/09/2023 101  70 - 110 mg/dL Final    BUN 02/09/2023 33 (H)  8 - 23 mg/dL Final    Creatinine 02/09/2023 2.8 (H)  0.5 - 1.4 mg/dL Final    Calcium 02/09/2023 8.5 (L)  8.7 - 10.5 mg/dL Final    Total Protein 02/09/2023 6.5  6.0 - 8.4 g/dL Final    Albumin 02/09/2023 3.0 (L)  3.5 - 5.2 g/dL Final    Total Bilirubin 02/09/2023 4.8 (H)  0.1 - 1.0 mg/dL Final    Comment: For infants and newborns, interpretation of results should be based  on gestational age, weight and in agreement with clinical  observations.    Premature Infant recommended reference ranges:  Up to 24 hours.............<8.0 mg/dL  Up to 48 hours............<12.0 mg/dL  3-5 days..................<15.0 mg/dL  6-29 days.................<15.0 mg/dL      Alkaline Phosphatase 02/09/2023 288 (H)  55 - 135 U/L Final    AST 02/09/2023 395 (H)  10 - 40 U/L Final    ALT 02/09/2023 402 (H)  10 - 44 U/L Final    Anion Gap 02/09/2023 12  8 - 16 mmol/L Final    eGFR 02/09/2023 23.1 (A)  >60 mL/min/1.73 m^2 Final    Lipase 02/09/2023 27  4 - 60 U/L Final    WBC 02/09/2023 8.70  3.90 - 12.70 K/uL Final    RBC 02/09/2023 4.01 (L)  4.60 - 6.20 M/uL Final    Hemoglobin 02/09/2023 11.8 (L)  14.0 - 18.0 g/dL Final    Hematocrit 02/09/2023 37.0 (L)  40.0 - 54.0 % Final    MCV 02/09/2023 92  82 - 98 fL Final    MCH 02/09/2023 29.4  27.0 - 31.0 pg Final    MCHC 02/09/2023 31.9 (L)  32.0 - 36.0 g/dL Final    RDW 02/09/2023 14.9 (H)  11.5 - 14.5 % Final    Platelets 02/09/2023 127 (L)  150 - 450 K/uL Final    MPV 02/09/2023 11.4  9.2 - 12.9 fL Final    Immature Granulocytes 02/09/2023 0.3  0.0 - 0.5 % Final    Gran # (ANC) 02/09/2023 7.9 (H)  1.8 - 7.7 K/uL Final    Immature Grans (Abs) 02/09/2023 0.03  0.00 - 0.04 K/uL Final    Comment: Mild elevation in immature granulocytes is non specific and   can be seen in a variety of conditions including  stress response,   acute inflammation, trauma and pregnancy. Correlation with other   laboratory and clinical findings is essential.      Lymph # 02/09/2023 0.4 (L)  1.0 - 4.8 K/uL Final    Mono # 02/09/2023 0.3  0.3 - 1.0 K/uL Final    Eos # 02/09/2023 0.0  0.0 - 0.5 K/uL Final    Baso # 02/09/2023 0.02  0.00 - 0.20 K/uL Final    nRBC 02/09/2023 0  0 /100 WBC Final    Gran % 02/09/2023 91.1 (H)  38.0 - 73.0 % Final    Lymph % 02/09/2023 4.3 (L)  18.0 - 48.0 % Final    Mono % 02/09/2023 3.9 (L)  4.0 - 15.0 % Final    Eosinophil % 02/09/2023 0.2  0.0 - 8.0 % Final    Basophil % 02/09/2023 0.2  0.0 - 1.9 % Final    Differential Method 02/09/2023 Automated   Final    POCT Glucose 02/09/2023 68 (L)  70 - 110 mg/dL Final   Lab Visit on 01/31/2023   Component Date Value Ref Range Status    Chloride, Urine 01/31/2023 73  25 - 200 mmol/L Final    Comment: The random urine reference ranges provided were established   for 24 hour urine collections.  No reference ranges exist for  random urine specimens.  Correlate clinically.      Osmolality, Urine 01/31/2023 373  50 - 1200 mOsm/kg Final    Comment: The random urine reference ranges provided were established   for 24 hour urine collections.  No reference ranges exist for  random urine specimens.  Correlate clinically.      Specimen UA 01/31/2023 Urine, Clean Catch   Final    Color, UA 01/31/2023 Colorless (A)  Yellow, Straw, Stacy Final    Appearance, UA 01/31/2023 Clear  Clear Final    pH, UA 01/31/2023 6.0  5.0 - 8.0 Final    Specific Gravity, UA 01/31/2023 1.010  1.005 - 1.030 Final    Protein, UA 01/31/2023 Negative  Negative Final    Comment: Recommend a 24 hour urine protein or a urine   protein/creatinine ratio if globulin induced proteinuria is  clinically suspected.      Glucose, UA 01/31/2023 1+ (A)  Negative Final    Ketones, UA 01/31/2023 Negative  Negative Final    Bilirubin (UA) 01/31/2023 Negative  Negative Final    Occult Blood UA 01/31/2023 Negative  Negative  Final    Nitrite, UA 01/31/2023 Negative  Negative Final    Urobilinogen, UA 01/31/2023 Negative  Negative EU/dL Final    Leukocytes, UA 01/31/2023 Negative  Negative Final    Potassium, Urine 01/31/2023 32  15 - 95 mmol/L Final    Comment: The random urine reference ranges provided were established   for 24 hour urine collections.  No reference ranges exist for  random urine specimens.  Correlate clinically.     Lab Visit on 01/30/2023   Component Date Value Ref Range Status    Protein, Urine Random 01/31/2023 9  0 - 15 mg/dL Final    Creatinine, Urine 01/31/2023 54.0  23.0 - 375.0 mg/dL Final    Prot/Creat Ratio, Urine 01/31/2023 0.17  0.00 - 0.20 Final   Lab Visit on 01/30/2023   Component Date Value Ref Range Status    PTH, Intact 01/30/2023 152.6 (H)  9.0 - 77.0 pg/mL Final    Vit D, 25-Hydroxy 01/30/2023 32  30 - 96 ng/mL Final    Comment: Vitamin D deficiency.........<10 ng/mL                              Vitamin D insufficiency......10-29 ng/mL       Vitamin D sufficiency........> or equal to 30 ng/mL  Vitamin D toxicity............>100 ng/mL      Glucose 01/30/2023 186 (H)  70 - 110 mg/dL Final    Sodium 01/30/2023 142  136 - 145 mmol/L Final    Potassium 01/30/2023 3.7  3.5 - 5.1 mmol/L Final    Chloride 01/30/2023 104  95 - 110 mmol/L Final    CO2 01/30/2023 25  23 - 29 mmol/L Final    BUN 01/30/2023 41 (H)  8 - 23 mg/dL Final    Calcium 01/30/2023 9.5  8.7 - 10.5 mg/dL Final    Creatinine 01/30/2023 2.9 (H)  0.5 - 1.4 mg/dL Final    Albumin 01/30/2023 3.5  3.5 - 5.2 g/dL Final    Phosphorus 01/30/2023 4.2  2.7 - 4.5 mg/dL Final    eGFR 01/30/2023 22.1 (A)  >60 mL/min/1.73 m^2 Final    Anion Gap 01/30/2023 13  8 - 16 mmol/L Final    WBC 01/30/2023 6.01  3.90 - 12.70 K/uL Final    RBC 01/30/2023 4.52 (L)  4.60 - 6.20 M/uL Final    Hemoglobin 01/30/2023 13.1 (L)  14.0 - 18.0 g/dL Final    Hematocrit 01/30/2023 40.7  40.0 - 54.0 % Final    MCV 01/30/2023 90  82 - 98 fL Final    MCH 01/30/2023 29.0  27.0 -  31.0 pg Final    MCHC 01/30/2023 32.2  32.0 - 36.0 g/dL Final    RDW 01/30/2023 14.5  11.5 - 14.5 % Final    Platelets 01/30/2023 205  150 - 450 K/uL Final    MPV 01/30/2023 11.0  9.2 - 12.9 fL Final    Immature Granulocytes 01/30/2023 0.3  0.0 - 0.5 % Final    Gran # (ANC) 01/30/2023 3.6  1.8 - 7.7 K/uL Final    Immature Grans (Abs) 01/30/2023 0.02  0.00 - 0.04 K/uL Final    Comment: Mild elevation in immature granulocytes is non specific and   can be seen in a variety of conditions including stress response,   acute inflammation, trauma and pregnancy. Correlation with other   laboratory and clinical findings is essential.      Lymph # 01/30/2023 1.9  1.0 - 4.8 K/uL Final    Mono # 01/30/2023 0.4  0.3 - 1.0 K/uL Final    Eos # 01/30/2023 0.2  0.0 - 0.5 K/uL Final    Baso # 01/30/2023 0.03  0.00 - 0.20 K/uL Final    nRBC 01/30/2023 0  0 /100 WBC Final    Gran % 01/30/2023 59.3  38.0 - 73.0 % Final    Lymph % 01/30/2023 31.3  18.0 - 48.0 % Final    Mono % 01/30/2023 5.8  4.0 - 15.0 % Final    Eosinophil % 01/30/2023 2.8  0.0 - 8.0 % Final    Basophil % 01/30/2023 0.5  0.0 - 1.9 % Final    Differential Method 01/30/2023 Automated   Final   Lab Visit on 01/27/2023   Component Date Value Ref Range Status    Urine Volume 01/27/2023 2900  mL Final    Urine Collection Duration 01/27/2023 24  Hr Final    Creatinine, Urine 01/27/2023 50.0  23.0 - 375.0 mg/dL Final    Creatinine, Timed Urine 01/27/2023 60.4  40.0 - 75.0 mg/Hr Final    Creatinine, Urinr (mg/spec) 01/27/2023 1450.0  mg/Spec Final    Urine Volume 01/27/2023 2900  mL Final    Urine Collection Duration 01/27/2023 24  Hr Final    Sodium, Urine 01/27/2023 81  20 - 250 mmol/L Final    Sodium, Timed Urine 01/27/2023 9.8 (H)  2.0 - 9.0 mmol/Hr Final    Sodium, Urine (mmol/spec) 01/27/2023 235  mmol/Spec Final    Urine Volume 01/27/2023 2900  mL Final    Urine Collection Duration 01/27/2023 24  Hr Final    Urine Potassium 01/27/2023 30  15 - 95 mmol/L Final     Potassium, 24 Hr Urine 01/27/2023 3.6  1.0 - 5.0 mmol/Hr Final    Potassium, Urine (mmol/spec) 01/27/2023 87.0  mmol/Spec Final    [facility] Miscellaneous Test 1 01/27/2023 SEE NOTE   Final    Comment: Test name                    Result Flag  Units  RefIntvl  ------------------------------------------------------------  Glucose, Urine - per volume  261       mg/dL             Performed At: [facility] [place] [address]  Medical Director: BHARTI KRAMER DO  CLIA Number: [id number]  Glucose, Urine - per 24h  7569        mg/d             Performed At: [place] [address]  Medical Director: BHARTI KRAMER DO  CLIA Number: [id number]  Urine Creatinine - mg per dL  47       mg/dL             Performed At: [facility]  [place] [address]  Medical Director: BHARTI KRAMER DO  CLIA Number: [id number]  Urine Creatinine - per 24 hour  1363        mg/d             Performed At: [facility] [place] [address]  [employer]                           ctor: BHARTI KRAMER DO  CLIA Number: [id number]  Urine Total Volume  2900          mL             Performed At: [facility]  [place] [address]  Medical Director: BHARTI KRAMER DO  CLIA Number: [id number]  Collected Hours                  24          hr  Performed At: [place] [address]  Medical Director: BHARTI KRAMER DO  CLIA Number: [id number]     Lab Visit on 01/13/2023   Component Date Value Ref Range Status    Osmolality 01/13/2023 313 (H)  280 - 300 mOsm/kg Final   Lab Visit on 01/13/2023   Component Date Value Ref Range Status    Sodium, Urine  01/13/2023 32  20 - 250 mmol/L Final    Comment: The random urine reference ranges provided were established   for 24 hour urine collections.  No reference ranges exist for  random urine specimens.  Correlate clinically.     There may be more visits with results that are not included.       EKG  12/2022 normal sinus rhythm normal rate borderline AZ interval right bundle-branch block LAFB prolonged QT    Echo   Results for orders placed or performed during the hospital encounter of 12/15/22   Echo   Result Value Ref Range    BSA 2.26 m2    TDI SEPTAL 0.06 m/s    LV LATERAL E/E' RATIO 3.36 m/s    LV SEPTAL E/E' RATIO 6.17 m/s    Left Ventricular Outflow Tract Mean Velocity 0.74 cm/s    Left Ventricular Outflow Tract Mean Gradient 2.35 mmHg    AORTIC VALVE CUSP SEPERATION 1.69 cm    TDI LATERAL 0.11 m/s    PV PEAK VELOCITY 1.15 cm/s    LVIDd 5.13 3.5 - 6.0 cm    IVS 1.31 (A) 0.6 - 1.1 cm    Posterior Wall 1.32 (A) 0.6 - 1.1 cm    Ao root annulus 3.57 cm    LVIDs 4.01 (A) 2.1 - 4.0 cm    FS 22 28 - 44 %    LV mass 277.06 g    RVDD 3.24 cm    Left Ventricle Relative Wall Thickness 0.51 cm    AV Velocity Ratio 0.71     MV valve area p 1/2 method 2.76 cm2    E/A ratio 0.56     Mean e' 0.09 m/s    E wave deceleration time 274.49 msec    IVRT 72.31 msec    LVOT diameter 1.94 cm    LVOT area 3.0 cm2    LVOT peak joao 0.97 m/s    LVOT peak VTI 23.20 cm    Ao peak joao 1.36 m/s    LVOT stroke volume 68.54 cm3    AV peak gradient 7 mmHg    E/E' ratio 4.35 m/s    MV Peak E Joao 0.37 m/s    TR Max Joao 2.83 m/s    MV stenosis pressure 1/2 time 79.60 ms    MV Peak A Joao 0.66 m/s    LV Systolic Volume 70.32 mL    LV Systolic Volume Index 31.3 mL/m2    LV Diastolic Volume 125.27 mL    LV Diastolic Volume Index 55.68 mL/m2    LV Mass Index 123 g/m2    RA Major Axis 5.09 cm    Left Atrium Minor Axis 3.29 cm    Left Atrium Major Axis 6.12 cm    Triscuspid Valve Regurgitation Peak Gradient 32 mmHg    LA Volume Index (Mod) 22.2 mL/m2    LA  volume (mod) 49.99 cm3    RA Width 2.10 cm    EF 60 %    Proximal aorta 3.60 cm    Narrative    · The left ventricle is normal in size with mild concentric hypertrophy   and normal systolic function.  · The estimated ejection fraction is 60%. Technically difficult study. EF   is based on limited views with poor endocardial border visualization.  · Indeterminate left ventricular diastolic function.  · Normal right ventricular size with normal right ventricular systolic   function.  · PASP is 32 plus central venous pressure which could not be determined.  · The ascending aorta is mildly dilated.          Imaging  No results found.    Prior coronary angiogram / intervention:  See HPI    Assessment and Plan  1. Syncope, orthostatic / autonomic dysfunction  See regimen in HPI  Complex      2. Orthostatic hypotension /autonomic dysfunction  As above    3. Essential hypertension  As above  Patient has labile hypertension with frequent hypotension very complex as in HPI  Will change coreg to toprol given VT +/- AF (not compliant with Coreg b.i.d.) and give hydral PRN for significant hypertension     4. Coronary artery disease involving native coronary artery of native heart without angina pectoris  Continue aspirin with Pravachol  Would suggest discontinuation of Plavix as he has not had PCI since 2017  Evidently there allergies to Crestor and Lipitor.  These allergies are unknown.    5. Prolonged QT  Avoid QT prolonging agents though requires amnio for prior history of sustained VT    6. Hyperlipidemia, unspecified hyperlipidemia type  Statin    7. VT (ventricular tachycardia) +/- pAF?  The patient has an implanted LINQ device  Continue amiodarone and BB  Do not have enough evidence of PAF to consider Eliquis at this time  See HPI  Send to EP    8. Ascending aortic aneurysm, unspecified whether ruptured  Control hypertension on beta-blocker  Serial monitoring difficult with advanced CKD    9. Carotid stenosis  Repeat  u/s    Follow Up  1 month      Aj Marrufo MD, FACC, RPVI  Interventional Cardiology

## 2023-03-03 ENCOUNTER — PATIENT MESSAGE (OUTPATIENT)
Dept: GASTROENTEROLOGY | Facility: CLINIC | Age: 73
End: 2023-03-03
Payer: MEDICARE

## 2023-03-03 ENCOUNTER — TELEPHONE (OUTPATIENT)
Dept: ELECTROPHYSIOLOGY | Facility: CLINIC | Age: 73
End: 2023-03-03
Payer: MEDICARE

## 2023-03-03 RX ORDER — CHOLESTYRAMINE 4 G/9G
4 POWDER, FOR SUSPENSION ORAL
Qty: 270 PACKET | Refills: 3 | Status: ON HOLD | OUTPATIENT
Start: 2023-03-03 | End: 2023-05-29 | Stop reason: HOSPADM

## 2023-03-03 NOTE — PROGRESS NOTES
Subjective:     HPI    I had the pleasure of seeing Jarrett Charlton  in consultation at your request for the evaluation of VT. He is a 72M with SBO, s/p colostomy, GERD, parotid mass s/p resection, BPH & prostate CA s/p TURP, CKD 4, pleural plaque, orthostatic hypotension, hypertension, hyperlipidemia, DM2, CAD status post PCI to mid LAD in 2017 (LHC in 2021 showed patent stent, otherwise nonobstructive CAD), who reportedly had VT seen on Holter monitor in 4/2021 and was started on amiodarone around this time. An ILR was implanted in 5/2021, presumably for VT management purposes. Pt reportedly has had several episodes of NSVT captured since device implant (longest 36 beats), as well as episodes of AF and SVT.     Pt currently on amiodarone 200 mg daily and toprol xl 25 mg daily, in addition to midodrine. Not on DOAC.    Echo in 12/2022 showed EF 60%.    I reviewed today's device interrogation which shows stored episodes of AF are in fact most consistent with artifact.    My interpretation of today's ECG is NSR at 69 bpm with RBBB.    Review of Systems   Constitutional: Negative for decreased appetite, malaise/fatigue, weight gain and weight loss.   HENT:  Negative for sore throat.    Eyes:  Negative for blurred vision.   Cardiovascular:  Negative for chest pain, dyspnea on exertion, irregular heartbeat, leg swelling, near-syncope, orthopnea, palpitations, paroxysmal nocturnal dyspnea and syncope.   Respiratory:  Negative for shortness of breath.    Skin:  Negative for rash.   Musculoskeletal:  Negative for arthritis.   Gastrointestinal:  Negative for abdominal pain.   Neurological:  Negative for focal weakness.   Psychiatric/Behavioral:  Negative for altered mental status.       Objective:    Physical Exam  Vitals and nursing note reviewed.   Constitutional:       General: He is not in acute distress.     Appearance: He is well-developed.   HENT:      Head: Normocephalic and atraumatic.   Eyes:      General: No  scleral icterus.     Pupils: Pupils are equal, round, and reactive to light.   Neck:      Thyroid: No thyromegaly.   Cardiovascular:      Rate and Rhythm: Regular rhythm.      Pulses: Normal pulses.      Heart sounds: Normal heart sounds. No murmur heard.    No friction rub. No gallop.   Pulmonary:      Effort: Pulmonary effort is normal.      Breath sounds: Normal breath sounds.   Abdominal:      General: Bowel sounds are normal. There is no distension.      Palpations: Abdomen is soft.      Tenderness: There is no abdominal tenderness.   Musculoskeletal:      Cervical back: Neck supple.   Skin:     General: Skin is warm and dry.      Findings: No rash.   Neurological:      Mental Status: He is alert and oriented to person, place, and time.   Psychiatric:         Behavior: Behavior normal.         Assessment:       1. Paroxysmal ventricular tachycardia    2. Bifascicular block    3. Essential hypertension    4. Mixed hyperlipidemia    5. Presence of arterial stent         Plan:       In summary, Jarrett Charlton Jr. is a 72M with a reported history of VT and AF. AF episodes I have seen are most consistent with artifact.     Plan is to stop amiodarone, continue toprol xl 25 mg daily, follow-up 1 year. Remote monitoring for ILR will be performed through our clinic going forward.    Thank you for allowing me to participate in the care of this patient. Please do not hesitate to call me with any questions or concerns.

## 2023-03-03 NOTE — TELEPHONE ENCOUNTER
Returned call to pt. Pt stated that someone called to see if he had anything metal in him and he forgot about the staples from his surgery last month. Explained to pt that he is having a office visit with Dr Taylor on Monday and if he should happen to need a procedure time since last procedure would be taken into consideration.      ----- Message from Nanda Bustillo sent at 3/3/2023  1:56 PM CST -----  Regarding: procedure  Pt is schedule for a procedure and forgot that he has staples in his stomach from a previous surgery he had last Month.    He want to be sure you are aware.    Thank you

## 2023-03-06 ENCOUNTER — CLINICAL SUPPORT (OUTPATIENT)
Dept: CARDIOLOGY | Facility: HOSPITAL | Age: 73
End: 2023-03-06
Attending: INTERNAL MEDICINE
Payer: MEDICARE

## 2023-03-06 ENCOUNTER — PATIENT OUTREACH (OUTPATIENT)
Dept: ADMINISTRATIVE | Facility: HOSPITAL | Age: 73
End: 2023-03-06
Payer: MEDICARE

## 2023-03-06 ENCOUNTER — OFFICE VISIT (OUTPATIENT)
Dept: ELECTROPHYSIOLOGY | Facility: CLINIC | Age: 73
End: 2023-03-06
Payer: MEDICARE

## 2023-03-06 VITALS
HEIGHT: 74 IN | SYSTOLIC BLOOD PRESSURE: 165 MMHG | BODY MASS INDEX: 26.94 KG/M2 | DIASTOLIC BLOOD PRESSURE: 95 MMHG | WEIGHT: 209.88 LBS | HEART RATE: 69 BPM

## 2023-03-06 DIAGNOSIS — I49.8 OTHER CARDIAC ARRHYTHMIA: ICD-10-CM

## 2023-03-06 DIAGNOSIS — I47.29 PAROXYSMAL VENTRICULAR TACHYCARDIA: Primary | ICD-10-CM

## 2023-03-06 DIAGNOSIS — I45.2 BIFASCICULAR BLOCK: ICD-10-CM

## 2023-03-06 DIAGNOSIS — Z95.9 UNSPECIFIED CARDIAC DEVICE IN SITU: ICD-10-CM

## 2023-03-06 DIAGNOSIS — I47.29 PAROXYSMAL VT: ICD-10-CM

## 2023-03-06 DIAGNOSIS — Z95.828 PRESENCE OF ARTERIAL STENT: ICD-10-CM

## 2023-03-06 DIAGNOSIS — I47.20 VT (VENTRICULAR TACHYCARDIA): ICD-10-CM

## 2023-03-06 DIAGNOSIS — I10 ESSENTIAL HYPERTENSION: ICD-10-CM

## 2023-03-06 DIAGNOSIS — E78.2 MIXED HYPERLIPIDEMIA: ICD-10-CM

## 2023-03-06 DIAGNOSIS — Z95.9 UNSPECIFIED CARDIAC DEVICE IN SITU: Primary | ICD-10-CM

## 2023-03-06 PROCEDURE — 3077F SYST BP >= 140 MM HG: CPT | Mod: CPTII,S$GLB,, | Performed by: INTERNAL MEDICINE

## 2023-03-06 PROCEDURE — 3288F PR FALLS RISK ASSESSMENT DOCUMENTED: ICD-10-PCS | Mod: CPTII,S$GLB,, | Performed by: INTERNAL MEDICINE

## 2023-03-06 PROCEDURE — 1101F PR PT FALLS ASSESS DOC 0-1 FALLS W/OUT INJ PAST YR: ICD-10-PCS | Mod: CPTII,S$GLB,, | Performed by: INTERNAL MEDICINE

## 2023-03-06 PROCEDURE — 99205 OFFICE O/P NEW HI 60 MIN: CPT | Mod: S$GLB,,, | Performed by: INTERNAL MEDICINE

## 2023-03-06 PROCEDURE — 99999 PR PBB SHADOW E&M-EST. PATIENT-LVL III: ICD-10-PCS | Mod: PBBFAC,,, | Performed by: INTERNAL MEDICINE

## 2023-03-06 PROCEDURE — 99205 PR OFFICE/OUTPT VISIT, NEW, LEVL V, 60-74 MIN: ICD-10-PCS | Mod: S$GLB,,, | Performed by: INTERNAL MEDICINE

## 2023-03-06 PROCEDURE — 99999 PR PBB SHADOW E&M-EST. PATIENT-LVL III: CPT | Mod: PBBFAC,,, | Performed by: INTERNAL MEDICINE

## 2023-03-06 PROCEDURE — 1111F DSCHRG MED/CURRENT MED MERGE: CPT | Mod: CPTII,S$GLB,, | Performed by: INTERNAL MEDICINE

## 2023-03-06 PROCEDURE — 3080F DIAST BP >= 90 MM HG: CPT | Mod: CPTII,S$GLB,, | Performed by: INTERNAL MEDICINE

## 2023-03-06 PROCEDURE — 93005 ELECTROCARDIOGRAM TRACING: CPT | Mod: S$GLB,,, | Performed by: INTERNAL MEDICINE

## 2023-03-06 PROCEDURE — 93285 PRGRMG DEV EVAL SCRMS IP: CPT | Mod: 26,,, | Performed by: INTERNAL MEDICINE

## 2023-03-06 PROCEDURE — 1101F PT FALLS ASSESS-DOCD LE1/YR: CPT | Mod: CPTII,S$GLB,, | Performed by: INTERNAL MEDICINE

## 2023-03-06 PROCEDURE — 3077F PR MOST RECENT SYSTOLIC BLOOD PRESSURE >= 140 MM HG: ICD-10-PCS | Mod: CPTII,S$GLB,, | Performed by: INTERNAL MEDICINE

## 2023-03-06 PROCEDURE — 93285 CARDIAC DEVICE CHECK - IN CLINIC & HOSPITAL: ICD-10-PCS | Mod: 26,,, | Performed by: INTERNAL MEDICINE

## 2023-03-06 PROCEDURE — 1126F AMNT PAIN NOTED NONE PRSNT: CPT | Mod: CPTII,S$GLB,, | Performed by: INTERNAL MEDICINE

## 2023-03-06 PROCEDURE — 3044F PR MOST RECENT HEMOGLOBIN A1C LEVEL <7.0%: ICD-10-PCS | Mod: CPTII,S$GLB,, | Performed by: INTERNAL MEDICINE

## 2023-03-06 PROCEDURE — 93010 RHYTHM STRIP: ICD-10-PCS | Mod: S$GLB,,, | Performed by: INTERNAL MEDICINE

## 2023-03-06 PROCEDURE — 1159F PR MEDICATION LIST DOCUMENTED IN MEDICAL RECORD: ICD-10-PCS | Mod: CPTII,S$GLB,, | Performed by: INTERNAL MEDICINE

## 2023-03-06 PROCEDURE — 93005 RHYTHM STRIP: ICD-10-PCS | Mod: S$GLB,,, | Performed by: INTERNAL MEDICINE

## 2023-03-06 PROCEDURE — 1111F PR DISCHARGE MEDS RECONCILED W/ CURRENT OUTPATIENT MED LIST: ICD-10-PCS | Mod: CPTII,S$GLB,, | Performed by: INTERNAL MEDICINE

## 2023-03-06 PROCEDURE — 3008F BODY MASS INDEX DOCD: CPT | Mod: CPTII,S$GLB,, | Performed by: INTERNAL MEDICINE

## 2023-03-06 PROCEDURE — 1126F PR PAIN SEVERITY QUANTIFIED, NO PAIN PRESENT: ICD-10-PCS | Mod: CPTII,S$GLB,, | Performed by: INTERNAL MEDICINE

## 2023-03-06 PROCEDURE — 3008F PR BODY MASS INDEX (BMI) DOCUMENTED: ICD-10-PCS | Mod: CPTII,S$GLB,, | Performed by: INTERNAL MEDICINE

## 2023-03-06 PROCEDURE — 1159F MED LIST DOCD IN RCRD: CPT | Mod: CPTII,S$GLB,, | Performed by: INTERNAL MEDICINE

## 2023-03-06 PROCEDURE — 3044F HG A1C LEVEL LT 7.0%: CPT | Mod: CPTII,S$GLB,, | Performed by: INTERNAL MEDICINE

## 2023-03-06 PROCEDURE — 93010 ELECTROCARDIOGRAM REPORT: CPT | Mod: S$GLB,,, | Performed by: INTERNAL MEDICINE

## 2023-03-06 PROCEDURE — 3080F PR MOST RECENT DIASTOLIC BLOOD PRESSURE >= 90 MM HG: ICD-10-PCS | Mod: CPTII,S$GLB,, | Performed by: INTERNAL MEDICINE

## 2023-03-06 PROCEDURE — 3288F FALL RISK ASSESSMENT DOCD: CPT | Mod: CPTII,S$GLB,, | Performed by: INTERNAL MEDICINE

## 2023-03-06 NOTE — PROGRESS NOTES
Health Maintenance Due   Topic Date Due    Shingles Vaccine (2 of 2) 11/01/2019    Eye Exam  09/24/2021        Chart review done.   HM updated.   Immunizations reviewed & updated.   Care Everywhere updated.

## 2023-03-07 ENCOUNTER — OFFICE VISIT (OUTPATIENT)
Dept: SURGERY | Facility: CLINIC | Age: 73
End: 2023-03-07
Payer: MEDICARE

## 2023-03-07 ENCOUNTER — TELEPHONE (OUTPATIENT)
Dept: CARDIOLOGY | Facility: CLINIC | Age: 73
End: 2023-03-07
Payer: MEDICARE

## 2023-03-07 VITALS
SYSTOLIC BLOOD PRESSURE: 187 MMHG | HEIGHT: 74 IN | WEIGHT: 209.69 LBS | DIASTOLIC BLOOD PRESSURE: 85 MMHG | HEART RATE: 70 BPM | BODY MASS INDEX: 26.91 KG/M2

## 2023-03-07 DIAGNOSIS — K81.2 ACUTE CHOLECYSTITIS WITH CHRONIC CHOLECYSTITIS: Primary | ICD-10-CM

## 2023-03-07 PROCEDURE — 3288F FALL RISK ASSESSMENT DOCD: CPT | Mod: CPTII,S$GLB,, | Performed by: SURGERY

## 2023-03-07 PROCEDURE — 1101F PR PT FALLS ASSESS DOC 0-1 FALLS W/OUT INJ PAST YR: ICD-10-PCS | Mod: CPTII,S$GLB,, | Performed by: SURGERY

## 2023-03-07 PROCEDURE — 3288F PR FALLS RISK ASSESSMENT DOCUMENTED: ICD-10-PCS | Mod: CPTII,S$GLB,, | Performed by: SURGERY

## 2023-03-07 PROCEDURE — 3077F SYST BP >= 140 MM HG: CPT | Mod: CPTII,S$GLB,, | Performed by: SURGERY

## 2023-03-07 PROCEDURE — 3008F PR BODY MASS INDEX (BMI) DOCUMENTED: ICD-10-PCS | Mod: CPTII,S$GLB,, | Performed by: SURGERY

## 2023-03-07 PROCEDURE — 1126F PR PAIN SEVERITY QUANTIFIED, NO PAIN PRESENT: ICD-10-PCS | Mod: CPTII,S$GLB,, | Performed by: SURGERY

## 2023-03-07 PROCEDURE — 99999 PR PBB SHADOW E&M-EST. PATIENT-LVL III: CPT | Mod: PBBFAC,,, | Performed by: SURGERY

## 2023-03-07 PROCEDURE — 1160F PR REVIEW ALL MEDS BY PRESCRIBER/CLIN PHARMACIST DOCUMENTED: ICD-10-PCS | Mod: CPTII,S$GLB,, | Performed by: SURGERY

## 2023-03-07 PROCEDURE — 99024 POSTOP FOLLOW-UP VISIT: CPT | Mod: S$GLB,,, | Performed by: SURGERY

## 2023-03-07 PROCEDURE — 3044F PR MOST RECENT HEMOGLOBIN A1C LEVEL <7.0%: ICD-10-PCS | Mod: CPTII,S$GLB,, | Performed by: SURGERY

## 2023-03-07 PROCEDURE — 99999 PR PBB SHADOW E&M-EST. PATIENT-LVL III: ICD-10-PCS | Mod: PBBFAC,,, | Performed by: SURGERY

## 2023-03-07 PROCEDURE — 1126F AMNT PAIN NOTED NONE PRSNT: CPT | Mod: CPTII,S$GLB,, | Performed by: SURGERY

## 2023-03-07 PROCEDURE — 1159F PR MEDICATION LIST DOCUMENTED IN MEDICAL RECORD: ICD-10-PCS | Mod: CPTII,S$GLB,, | Performed by: SURGERY

## 2023-03-07 PROCEDURE — 1101F PT FALLS ASSESS-DOCD LE1/YR: CPT | Mod: CPTII,S$GLB,, | Performed by: SURGERY

## 2023-03-07 PROCEDURE — 3044F HG A1C LEVEL LT 7.0%: CPT | Mod: CPTII,S$GLB,, | Performed by: SURGERY

## 2023-03-07 PROCEDURE — 3079F PR MOST RECENT DIASTOLIC BLOOD PRESSURE 80-89 MM HG: ICD-10-PCS | Mod: CPTII,S$GLB,, | Performed by: SURGERY

## 2023-03-07 PROCEDURE — 3079F DIAST BP 80-89 MM HG: CPT | Mod: CPTII,S$GLB,, | Performed by: SURGERY

## 2023-03-07 PROCEDURE — 3077F PR MOST RECENT SYSTOLIC BLOOD PRESSURE >= 140 MM HG: ICD-10-PCS | Mod: CPTII,S$GLB,, | Performed by: SURGERY

## 2023-03-07 PROCEDURE — 1159F MED LIST DOCD IN RCRD: CPT | Mod: CPTII,S$GLB,, | Performed by: SURGERY

## 2023-03-07 PROCEDURE — 99024 PR POST-OP FOLLOW-UP VISIT: ICD-10-PCS | Mod: S$GLB,,, | Performed by: SURGERY

## 2023-03-07 PROCEDURE — 1160F RVW MEDS BY RX/DR IN RCRD: CPT | Mod: CPTII,S$GLB,, | Performed by: SURGERY

## 2023-03-07 PROCEDURE — 3008F BODY MASS INDEX DOCD: CPT | Mod: CPTII,S$GLB,, | Performed by: SURGERY

## 2023-03-07 NOTE — TELEPHONE ENCOUNTER
----- Message from Martha Gonzalez sent at 3/7/2023 12:08 PM CST -----  Regarding: Speak to staff   Pt is calling to speak to staff, in regards to his blood pressure being high.  Requesting a call back.      [phone] (home)

## 2023-03-07 NOTE — PROGRESS NOTES
[facility]  POST-OP NOTE    HPI: Jarrett Charlton Jr. is a 72 y.o. male doing well, some residual incisional pain      VITALS:  Vitals:    03/07/23 0904   BP: (!) 187/85   Pulse: 70       PHYSICAL EXAM:  GEN: NAD  HEENT: NCAT  ABD: incisions C/D/I; staples removed      ASSESSMENT & PLAN:  72 y.o. male , RTC as needed, would continue light activity for 6 weeks total      Mike Joyce M.D., F.A.C.S.  Ssjeoa-Rdxljmnxm-Lscxpvk and General Surgery  [facility]

## 2023-03-07 NOTE — TELEPHONE ENCOUNTER
LOV 03/02/2023    Spoke with patient, his blood pressure has been high.  He states he has taken the hydralazine, however, it seems its not bringing the pressure down.    03/06/2023 home BP readings:  175/102 (took hydralazine)  0215 139/79 P 69  0400 163/101  0500 161/94    03/07/2023 0940 187/85 P 96 (at doctors office)                     1230 133/82    Any recommendations?

## 2023-03-08 ENCOUNTER — TELEPHONE (OUTPATIENT)
Dept: CARDIOLOGY | Facility: CLINIC | Age: 73
End: 2023-03-08
Payer: MEDICARE

## 2023-03-08 NOTE — TELEPHONE ENCOUNTER
"----- Message from Antonio Jaquez sent at 3/8/2023  3:02 PM CST -----  Consult/Advisory:          Name Of Caller: Self      Contact Preference?: [phone]       Provider Name: Yaron      Does patient feel the need to be seen today? No      What is the nature of the call?: Calling to inform office that one of his other doctors have ordered for him to discontinue taking amLODIPine tablet 5 mg          Additional Notes:  "Thank you for all that you do for our patients"      "

## 2023-03-08 NOTE — TELEPHONE ENCOUNTER
LOV 03/02/2023    Spoke with patient, he saw Dr. Taylor on 03/06/2023.  Dr. Taylor stopped the amiodarone.      Patient wanted to let you know and confirm that this is alright?

## 2023-03-08 NOTE — TELEPHONE ENCOUNTER
Called patient to discuss blood pressure and use of hydralazine  Patient states that when his blood pressure is high he takes hydralazine and this works to bring his blood pressure down to 130-140 systolic  The patient states that blood pressure sometimes is again elevated hours later  Advised that he can take another hydralazine if this happens  Discussed rationale for hydralazine since it is a low-dose short-acting agent (I gave the patient 10 mg) and this is as intended given his issues with hypotension  Discussed use of hydralazine as above patient verbalized understanding

## 2023-03-10 ENCOUNTER — OFFICE VISIT (OUTPATIENT)
Dept: NEPHROLOGY | Facility: CLINIC | Age: 73
End: 2023-03-10
Payer: MEDICARE

## 2023-03-10 ENCOUNTER — TELEPHONE (OUTPATIENT)
Dept: CARDIOLOGY | Facility: CLINIC | Age: 73
End: 2023-03-10
Payer: MEDICARE

## 2023-03-10 ENCOUNTER — INFUSION (OUTPATIENT)
Dept: INFECTIOUS DISEASES | Facility: HOSPITAL | Age: 73
End: 2023-03-10
Attending: INTERNAL MEDICINE
Payer: MEDICARE

## 2023-03-10 VITALS
TEMPERATURE: 97 F | RESPIRATION RATE: 18 BRPM | WEIGHT: 206.69 LBS | HEART RATE: 69 BPM | OXYGEN SATURATION: 95 % | DIASTOLIC BLOOD PRESSURE: 86 MMHG | SYSTOLIC BLOOD PRESSURE: 179 MMHG | BODY MASS INDEX: 26.54 KG/M2

## 2023-03-10 VITALS
SYSTOLIC BLOOD PRESSURE: 158 MMHG | WEIGHT: 200.63 LBS | OXYGEN SATURATION: 99 % | BODY MASS INDEX: 25.76 KG/M2 | DIASTOLIC BLOOD PRESSURE: 93 MMHG | HEART RATE: 70 BPM

## 2023-03-10 DIAGNOSIS — Z93.3 COLOSTOMY PRESENT: ICD-10-CM

## 2023-03-10 DIAGNOSIS — N18.4 TYPE 2 DIABETES MELLITUS WITH STAGE 4 CHRONIC KIDNEY DISEASE, UNSPECIFIED WHETHER LONG TERM INSULIN USE: ICD-10-CM

## 2023-03-10 DIAGNOSIS — N18.32 TYPE 2 DIABETES MELLITUS WITH STAGE 3B CHRONIC KIDNEY DISEASE, WITHOUT LONG-TERM CURRENT USE OF INSULIN: ICD-10-CM

## 2023-03-10 DIAGNOSIS — E11.22 TYPE 2 DIABETES MELLITUS WITH STAGE 4 CHRONIC KIDNEY DISEASE, UNSPECIFIED WHETHER LONG TERM INSULIN USE: ICD-10-CM

## 2023-03-10 DIAGNOSIS — G90.1 DYSAUTONOMIA: ICD-10-CM

## 2023-03-10 DIAGNOSIS — E86.0 DEHYDRATION: Primary | ICD-10-CM

## 2023-03-10 DIAGNOSIS — I95.1 ORTHOSTATIC HYPOTENSION: ICD-10-CM

## 2023-03-10 DIAGNOSIS — E87.20 ACIDOSIS: ICD-10-CM

## 2023-03-10 DIAGNOSIS — N18.4 CHRONIC KIDNEY DISEASE, STAGE 4 (SEVERE): Primary | ICD-10-CM

## 2023-03-10 DIAGNOSIS — R09.89 LABILE BLOOD PRESSURE: ICD-10-CM

## 2023-03-10 DIAGNOSIS — K94.19 ALTERED BOWEL ELIMINATION DUE TO INTESTINAL OSTOMY: ICD-10-CM

## 2023-03-10 DIAGNOSIS — E11.22 TYPE 2 DIABETES MELLITUS WITH STAGE 3B CHRONIC KIDNEY DISEASE, WITHOUT LONG-TERM CURRENT USE OF INSULIN: ICD-10-CM

## 2023-03-10 DIAGNOSIS — R19.7 DIARRHEA, UNSPECIFIED TYPE: ICD-10-CM

## 2023-03-10 DIAGNOSIS — D64.9 ANEMIA, UNSPECIFIED TYPE: ICD-10-CM

## 2023-03-10 DIAGNOSIS — I10 HYPERTENSION, UNSPECIFIED TYPE: ICD-10-CM

## 2023-03-10 DIAGNOSIS — N25.81 SECONDARY HYPERPARATHYROIDISM: ICD-10-CM

## 2023-03-10 PROCEDURE — 3077F PR MOST RECENT SYSTOLIC BLOOD PRESSURE >= 140 MM HG: ICD-10-PCS | Mod: CPTII,S$GLB,, | Performed by: NURSE PRACTITIONER

## 2023-03-10 PROCEDURE — 1160F RVW MEDS BY RX/DR IN RCRD: CPT | Mod: CPTII,S$GLB,, | Performed by: NURSE PRACTITIONER

## 2023-03-10 PROCEDURE — 3080F PR MOST RECENT DIASTOLIC BLOOD PRESSURE >= 90 MM HG: ICD-10-PCS | Mod: CPTII,S$GLB,, | Performed by: NURSE PRACTITIONER

## 2023-03-10 PROCEDURE — 1101F PT FALLS ASSESS-DOCD LE1/YR: CPT | Mod: CPTII,S$GLB,, | Performed by: NURSE PRACTITIONER

## 2023-03-10 PROCEDURE — 1111F DSCHRG MED/CURRENT MED MERGE: CPT | Mod: CPTII,S$GLB,, | Performed by: NURSE PRACTITIONER

## 2023-03-10 PROCEDURE — 3066F NEPHROPATHY DOC TX: CPT | Mod: CPTII,S$GLB,, | Performed by: NURSE PRACTITIONER

## 2023-03-10 PROCEDURE — 3066F PR DOCUMENTATION OF TREATMENT FOR NEPHROPATHY: ICD-10-PCS | Mod: CPTII,S$GLB,, | Performed by: NURSE PRACTITIONER

## 2023-03-10 PROCEDURE — 1160F PR REVIEW ALL MEDS BY PRESCRIBER/CLIN PHARMACIST DOCUMENTED: ICD-10-PCS | Mod: CPTII,S$GLB,, | Performed by: NURSE PRACTITIONER

## 2023-03-10 PROCEDURE — 1159F MED LIST DOCD IN RCRD: CPT | Mod: CPTII,S$GLB,, | Performed by: NURSE PRACTITIONER

## 2023-03-10 PROCEDURE — 99999 PR PBB SHADOW E&M-EST. PATIENT-LVL IV: ICD-10-PCS | Mod: PBBFAC,,, | Performed by: NURSE PRACTITIONER

## 2023-03-10 PROCEDURE — 3288F FALL RISK ASSESSMENT DOCD: CPT | Mod: CPTII,S$GLB,, | Performed by: NURSE PRACTITIONER

## 2023-03-10 PROCEDURE — 1101F PR PT FALLS ASSESS DOC 0-1 FALLS W/OUT INJ PAST YR: ICD-10-PCS | Mod: CPTII,S$GLB,, | Performed by: NURSE PRACTITIONER

## 2023-03-10 PROCEDURE — 3077F SYST BP >= 140 MM HG: CPT | Mod: CPTII,S$GLB,, | Performed by: NURSE PRACTITIONER

## 2023-03-10 PROCEDURE — 25000003 PHARM REV CODE 250: Performed by: NURSE PRACTITIONER

## 2023-03-10 PROCEDURE — 3288F PR FALLS RISK ASSESSMENT DOCUMENTED: ICD-10-PCS | Mod: CPTII,S$GLB,, | Performed by: NURSE PRACTITIONER

## 2023-03-10 PROCEDURE — 1111F PR DISCHARGE MEDS RECONCILED W/ CURRENT OUTPATIENT MED LIST: ICD-10-PCS | Mod: CPTII,S$GLB,, | Performed by: NURSE PRACTITIONER

## 2023-03-10 PROCEDURE — 99999 PR PBB SHADOW E&M-EST. PATIENT-LVL IV: CPT | Mod: PBBFAC,,, | Performed by: NURSE PRACTITIONER

## 2023-03-10 PROCEDURE — 3008F BODY MASS INDEX DOCD: CPT | Mod: CPTII,S$GLB,, | Performed by: NURSE PRACTITIONER

## 2023-03-10 PROCEDURE — 1159F PR MEDICATION LIST DOCUMENTED IN MEDICAL RECORD: ICD-10-PCS | Mod: CPTII,S$GLB,, | Performed by: NURSE PRACTITIONER

## 2023-03-10 PROCEDURE — 3044F PR MOST RECENT HEMOGLOBIN A1C LEVEL <7.0%: ICD-10-PCS | Mod: CPTII,S$GLB,, | Performed by: NURSE PRACTITIONER

## 2023-03-10 PROCEDURE — 99215 PR OFFICE/OUTPT VISIT, EST, LEVL V, 40-54 MIN: ICD-10-PCS | Mod: S$GLB,,, | Performed by: NURSE PRACTITIONER

## 2023-03-10 PROCEDURE — 96360 HYDRATION IV INFUSION INIT: CPT

## 2023-03-10 PROCEDURE — 3080F DIAST BP >= 90 MM HG: CPT | Mod: CPTII,S$GLB,, | Performed by: NURSE PRACTITIONER

## 2023-03-10 PROCEDURE — 3044F HG A1C LEVEL LT 7.0%: CPT | Mod: CPTII,S$GLB,, | Performed by: NURSE PRACTITIONER

## 2023-03-10 PROCEDURE — 3008F PR BODY MASS INDEX (BMI) DOCUMENTED: ICD-10-PCS | Mod: CPTII,S$GLB,, | Performed by: NURSE PRACTITIONER

## 2023-03-10 PROCEDURE — 99215 OFFICE O/P EST HI 40 MIN: CPT | Mod: S$GLB,,, | Performed by: NURSE PRACTITIONER

## 2023-03-10 RX ORDER — HEPARIN 100 UNIT/ML
500 SYRINGE INTRAVENOUS
Status: CANCELLED | OUTPATIENT
Start: 2023-03-11

## 2023-03-10 RX ORDER — SODIUM CHLORIDE 0.9 % (FLUSH) 0.9 %
10 SYRINGE (ML) INJECTION
Status: CANCELLED | OUTPATIENT
Start: 2023-03-11

## 2023-03-10 RX ADMIN — SODIUM CHLORIDE 1000 ML: 9 INJECTION, SOLUTION INTRAVENOUS at 09:03

## 2023-03-10 NOTE — TELEPHONE ENCOUNTER
----- Message from Shazia Posadas sent at 3/10/2023 11:41 AM CST -----  Contact: Pt  Pt is requesting a callback in regards to medication adv. Pt stated he was speaking with staff but couldn't remember the name. Please adv pt      Confirmed contact below:   Contact Name:Jarrett Charlton  Phone Number: [phone]

## 2023-03-10 NOTE — TELEPHONE ENCOUNTER
LOV 03/02/2023  Next appt 04/24/2023    Spoke with patient, his blood pressure readings continue to vary    Yesterday:  /85 (at imaging appt)  BP 96/60  71 (last night)  BP 86/59  73  (about 2 AM)  He took 2 pills to raise pressure because he felt 1 did not help.    Today:  /84 (home this AM)  /83 (at MD office this AM)    Any recommendations?

## 2023-03-10 NOTE — PROGRESS NOTES
"  Subjective:       Patient ID: Jarrett Charlton Jr. is a 72 y.o. White male who presents for follow-up evaluation of   CKD, hyperkalemia    HPI    Last seen by me in May 2021. Previously seen by Dr. Lyon in Aug 2019.      Patient presents for f/u of CKD.  Baseline creatinine had been 1.3-1.5 c frequent increases to 1.6-1.8 range an occasional AKIs c higher sCr. Had GRADY in late December 2019 c peak sCr of 2.3 mg/dL 2/2 volume depletion from high output in ileostomy. Has had hypomagnesemia in the past.  Had K of 5.9 in November; provided with K finder. Since December 2019, new sCr baseline 1.4-1.6 mg/dL. Has been 2.0-2.1 since May 2021.    Home BPs: high in morning; 15 minutes after waking it decreases and is "low all day" until about 5 p.m., it goes up, then he takes his medications.  Recent loop recorder implanted. Has been getting IVF weekly to every other week by cardiologist at [place]. Also recently started Florinef by PCP.    Heart cath on 4/15/21; no significant blockages. Was sent to [facility] and told he needs a pacemaker. Says this is scheduled 5/13. Has been told to be taking pedialyte; still with diarrhea occasionally but much improved overall.    Significant medical problems include Hypertension for 5 years, DM for 3 yrs, gout, ulcerative colitis with ostomy and hyperlipidemia, prostate cancer    The patient denies taking NSAIDs.  Had IV contrast with cardiologist for MRI on 4/3/2020.     Social history: retired, took care of wheelchair-bound wife, but she passed away in July 2020  Significant family hx includes: no known kidney disease in family.    Last renal US: Sept 2019, reviewed. Simple left renal cyst.    Update August 2021:  Colostomy output still more solid "oatmeal" texture with water. Takes immodium if output is more liquid.  Still getting IVF infusions every other week by cardiology.  No recent syncopal episodes.  Home BPs: occasionally SBP dropping to 100; takes BP prior to taking meds " "and holds meds if needed.  Did not take meds yet today because pressure was low today.  Now on Northera for low BP per cardiology.    Denies NSAIDs; no recent hospitalizations.     Update December 2021:   Returns for f/u of CKD.  Baseline sCr 1.7-1.8 mg/dL.  Home BPs: 130-160/60-90; occasionally drops to SBP of high 80s and takes midodrine   Feels dizzy and lightheaded when blood glucose drops below 80  Denies NSAIDs; no recent hospitalizations.   Diarrhea now being treated by Dr. Baum as IBS. Viberzi was just approved but has not been started yet. Still with intermittent diarrhea. Still getting IVF per cardiology every other week.    Update 4/4/22:  Returns for f/u of CKD.  Baseline sCr 1.7-1.8 mg/dL.  Recent sCr 1.3-1.5 during admission for small bowel obstruction March 9-March 11.  Still gets IVF c Dr. Dao (cardiology) q 2 weeks. Dr. Dao is moving to [place], so pt may not be able to drive for this every two weeks. Next IVF is scheduled for 4/15/22.  Home BPs: labile at home. He takes Pettigrew if SBP gets as low as 110 and he takes his antihypertensives when SBP gets to 130-140. He took his antihypertensive medication while in the office today.    Update 4/11/22:  Returns for f/u of CKD.  Baseline sCr 1.7-1.8 mg/dL.  After appt on 4/4, pt's sCr returned at 3.0. Sent to ED. He was given IVF and had ileoscopy. sCr returned to previous baseline prior to discharge on 4/6.    Had IVF infusion on Friday 4/8/22 c cardiology. Had "bad" diarrhea on Saturday morning. Took immodium. He also vomited x 3. He became very weak and could not get off the bathroom floor, where he was sitting to be near toilet. Eventually he regained strength and was able to get up. He did not fall.  He has been drinking plenty of fluid and electrolyte fluids since that time. Stool output has returned to normal. No more vomiting. Says he feels "great" now.    Said he did a tilt table test years ago (before blood pressure issues), and it was " "normal at that time. Says drifts to the side when he walks. Says he gets dizzy if he stands up too fast.    Update 7/11/22:  Returns for f/u of CKD.  Was in ER with hypotension (SBPs 70s) 2/2 diarrhea output. Says he starts to feel tired when SBP drops to 105 mmHg.  Taking metamucil daily to help make stool more firm. Takes immodium when having diarrhea episodes. Says he tried to see GI but was unable to get an appt.  Home BPs: labile 80s-160s/60-90s. Followed closely by Dr. Dao  (outside cariologist).  Recently increased droxidopa to 600 mg BID per Dr. Dao. Also requesting to have weekly IVF instead every other week.    Update 8/1/22:  Returns for f/u GRADY.  sCr 3.5 mg/dL recently.  SBP in 80s frequently. Now on Northera 600 mg 4x/day per cardiologist. Carvedilol reduced to 6.25 .  Says he has not had liquid stools.  Says his cardiologist wants him to have tilt table test.    Update 11/4/22:  Returns for f/u of CKD.  Since last visit, he had a Tilt table test with his cardiologist. It was positive for "significant orthostatic hypotension and probably neurocardiogenic syncope of the mixed type." Cardiologist increased midodrine yesterday to 5 mg (from 2.5 mg) due to SBP in 80s.  No recent labs.  Home BPs: SBP 80s since Tuesday  Had 1L NS this morning.    Update 3/10/23:  Returns for f/u of CKD and for hospital follow up.  Baseline appears to be 2.3-2.4 mg/dL. Has been in this range since June 2022.  Home BPs: BPs are still labile. It was 80/66 last night. He had to take midorone 10 mg x 2 (about 4 hours apart).  Said BP dropped prior to having diarrhea yesterday, but he has had significant diarrhea overnight too.    Admitted 2/9/23 - 2/16/23 and had cholecystectomy.  Seen in ER 2/24 c wound dehiscence.     He has switched cardiologists and has had most of his BP medications changed.    Takes BodyArmour and glucerna        Review of Systems   Cardiovascular:  Reports swelling in the legs. Denies chest pain, " palpitations.  Gastrointestinal:  Says stool output is okay.  Genitourinary: Negative for difficulty urinating, dysuria, hematuria. Denies flank pain. Having nocturia. Reports good urine output.          Objective:       Blood pressure (!) 158/93, pulse 70, weight 91 kg (200 lb 9.9 oz), SpO2 99 %.  Physical Exam  Vitals signs reviewed.   Constitutional:       General: He is not in acute distress.     Appearance: Normal appearance. He is well-developed. He is not ill-appearing or diaphoretic.   Cardiac: normal rate  Pulmonary:  NAD    Skin:     General: Skin is warm and dry.     Neurological:      Mental Status: He is alert and oriented to person, place, and time.   Psychiatric:         Mood and Affect: Mood normal.         Behavior: Behavior normal.         Thought Content: Thought content normal.         Judgment: Judgment normal.         Lab Results   Component Value Date    CREATININE 2.4 (H) 02/24/2023    URICACID 4.8 09/20/2022     Prot/Creat Ratio, Urine   Date Value Ref Range Status   01/31/2023 0.17 0.00 - 0.20 Final   11/23/2022 0.23 (H) 0.00 - 0.20 Final   07/08/2022 0.13 0.00 - 0.20 Final     Lab Results   Component Value Date     02/24/2023    K 4.4 02/24/2023    CO2 17 (L) 02/24/2023     02/24/2023     Lab Results   Component Value Date    .6 (H) 01/30/2023    CALCIUM 8.9 02/24/2023    PHOS 4.2 01/30/2023     Lab Results   Component Value Date    HGB 10.5 (L) 02/24/2023    WBC 5.44 02/24/2023    HCT 33.4 (L) 02/24/2023      Lab Results   Component Value Date    HGBA1C 6.6 (H) 02/22/2023     02/24/2023    BUN 31 (H) 02/24/2023     Lab Results   Component Value Date    LDLCALC 80.6 09/20/2022         Outside labs 10/24/19  Crt: 1.5 mg/dL  Hgb: 15.2 g/dL  CO2: 20 mmol/L  K: 5.9 mmol/L  A1c: 7.1%    Assessment:       1. Chronic kidney disease, stage 4 (severe)    2. Stage 3b chronic kidney disease    3. Acidosis    4. Dysautonomia    5. Hypertension, unspecified type    6.  Secondary hyperparathyroidism    7. Diarrhea, unspecified type    8. Anemia, unspecified type    9. Labile blood pressure    10. Type 2 diabetes mellitus with stage 4 chronic kidney disease, unspecified whether long term insulin use    11. Altered bowel elimination due to intestinal ostomy    12. Orthostatic hypotension          Plan:     CKD stage 3B c eGFR 43-50 mL/min- clinically 2/2 age-related nephron loss, HTN, previous NSAID use for gout, volume depletion episodes c high ostomy output. Labile sCr. Progressive, likely from frequent AKIs 2/2 hypotensive episodes.  No longer using NSAIDs;  At risk for progression given frequent AKIs. On PPI.     Has been getting 1L NS weekly.  Uroscopy 8/1/22: a few granular casts.    UPCR No significant proteinuria. Lisinopril previously d/c to help decrease risk of frequent AKIs.     Acid-base Acidotic last month. Was having more diarrhea, which has improved.    Repeat labs next week    Renal osteodystrophy PTH mildly elevated. Ca okay at last check. On ergocalciferol.   Anemia At goal for CKD.    DM At goal for CKD.   Lipid Management On statin per PCP   ESRD Planning Anticipatory guidance provided about timing of dialysis. Start discussions and planning when eGFR is about 20 mL/min; most patients start dialysis between 5-10 mL/min.    Patient indicated that right now, he does not think he would want dialysis.         HTN - Still labile. Cardiology adjusting medications. On metoprolol 25 succinate mg  - Has PRN hydralazine 10 mg TID  - Has midodrine 10 mg PRN TID; unclear if he is still taking florinef.    - Allow for permissive HTN due to high propensity for hypotensive episodes causing AKIs  - Recommend that SBP be maintained over 110  - aldosterone slightly elevated, possibly 2/2 volume losses through ostomy. Carvedilol may suppress renin (he was on carvedilol at time of renin/saundra ratio)..  - Normetanephrine elevation not impressive, especially given level of CKD  - Tilt  table test showed significant orthostatic hypotension and probable neurocardiogenic syncope of mixed type. Will defer management of blood pressure to cardiologist with this diagnosis.    Diarrhea - per GI. On imodium and daily fiber. Per note, plans to add questran if output increases  - Pt was initially started on weekly IVF but Dr. Dao due to ostomy output and hypotension episodes.  Pt no longer has the amount of liquid stool he had at the time of initiation of IVF.  - Infusions were briefly increased to twice a week recently due to concern for polyuria and volume depletion from polyuria.  24 hour urine collection x 2 did not reveal polyuria. Pt initially had an increase in diarrhea p cholecystectomy, but he said it has become better controlled now.  - Without large quantities of liquid stool, will plan to decrease IVF to 1L/week.  - Will monitor and consider d/c of IVF.    All questions patient had were answered.  Asked if further questions. None. F/u in 2 mos with labs and urine prior to next visit or sooner if needed.  ER for emergency concerns.    Summary of Plan:  1. Get IVF today  2. Reduce to just once a week; report if having massive diarrhea  3. Repeat labs to monitor acidosis. Consider addition of sodium bicarb.  4. avoid NSAID/ bactrim/ IV contrast/ gadolinium/ aminoglycoside where possible  5. RTC in 2 mos    59 minutes of total time spent on the encounter, which includes face to face time and non-face to face time preparing to see the patient (eg, review of tests), Obtaining and/or reviewing separately obtained history, documenting clinical information in the electronic or other health record, independently interpreting results (not separately reported) and communicating results to the patient/family/caregiver, or Care coordination (not separately reported).

## 2023-03-10 NOTE — TELEPHONE ENCOUNTER
Spoke with patient, informed of provider message and recommendation.  Patient verbalized understanding.

## 2023-03-10 NOTE — PATIENT INSTRUCTIONS
Go get bloodwork after seeing your PCP.    See me in 2 months.    Reduce IV fluid to once a week. Report massive diarrhea.

## 2023-03-13 ENCOUNTER — OFFICE VISIT (OUTPATIENT)
Dept: PRIMARY CARE CLINIC | Facility: CLINIC | Age: 73
End: 2023-03-13
Payer: MEDICARE

## 2023-03-13 VITALS
RESPIRATION RATE: 19 BRPM | SYSTOLIC BLOOD PRESSURE: 142 MMHG | OXYGEN SATURATION: 98 % | BODY MASS INDEX: 26.94 KG/M2 | HEART RATE: 70 BPM | WEIGHT: 209.88 LBS | DIASTOLIC BLOOD PRESSURE: 80 MMHG | HEIGHT: 74 IN

## 2023-03-13 DIAGNOSIS — N18.32 STAGE 3B CHRONIC KIDNEY DISEASE: ICD-10-CM

## 2023-03-13 DIAGNOSIS — N18.32 TYPE 2 DIABETES MELLITUS WITH STAGE 3B CHRONIC KIDNEY DISEASE, WITHOUT LONG-TERM CURRENT USE OF INSULIN: Primary | ICD-10-CM

## 2023-03-13 DIAGNOSIS — I25.10 CORONARY ARTERY DISEASE INVOLVING NATIVE CORONARY ARTERY OF NATIVE HEART WITHOUT ANGINA PECTORIS: ICD-10-CM

## 2023-03-13 DIAGNOSIS — E11.22 TYPE 2 DIABETES MELLITUS WITH STAGE 3B CHRONIC KIDNEY DISEASE, WITHOUT LONG-TERM CURRENT USE OF INSULIN: Primary | ICD-10-CM

## 2023-03-13 DIAGNOSIS — I10 ESSENTIAL HYPERTENSION: ICD-10-CM

## 2023-03-13 DIAGNOSIS — I95.1 ORTHOSTATIC HYPOTENSION: ICD-10-CM

## 2023-03-13 PROCEDURE — 99214 PR OFFICE/OUTPT VISIT, EST, LEVL IV, 30-39 MIN: ICD-10-PCS | Mod: S$GLB,,, | Performed by: INTERNAL MEDICINE

## 2023-03-13 PROCEDURE — 1126F PR PAIN SEVERITY QUANTIFIED, NO PAIN PRESENT: ICD-10-PCS | Mod: CPTII,S$GLB,, | Performed by: INTERNAL MEDICINE

## 2023-03-13 PROCEDURE — 3288F FALL RISK ASSESSMENT DOCD: CPT | Mod: CPTII,S$GLB,, | Performed by: INTERNAL MEDICINE

## 2023-03-13 PROCEDURE — 99999 PR PBB SHADOW E&M-EST. PATIENT-LVL V: CPT | Mod: PBBFAC,,, | Performed by: INTERNAL MEDICINE

## 2023-03-13 PROCEDURE — 3044F HG A1C LEVEL LT 7.0%: CPT | Mod: CPTII,S$GLB,, | Performed by: INTERNAL MEDICINE

## 2023-03-13 PROCEDURE — 99214 OFFICE O/P EST MOD 30 MIN: CPT | Mod: S$GLB,,, | Performed by: INTERNAL MEDICINE

## 2023-03-13 PROCEDURE — 1111F DSCHRG MED/CURRENT MED MERGE: CPT | Mod: CPTII,S$GLB,, | Performed by: INTERNAL MEDICINE

## 2023-03-13 PROCEDURE — 3077F PR MOST RECENT SYSTOLIC BLOOD PRESSURE >= 140 MM HG: ICD-10-PCS | Mod: CPTII,S$GLB,, | Performed by: INTERNAL MEDICINE

## 2023-03-13 PROCEDURE — 3079F PR MOST RECENT DIASTOLIC BLOOD PRESSURE 80-89 MM HG: ICD-10-PCS | Mod: CPTII,S$GLB,, | Performed by: INTERNAL MEDICINE

## 2023-03-13 PROCEDURE — 99999 PR PBB SHADOW E&M-EST. PATIENT-LVL V: ICD-10-PCS | Mod: PBBFAC,,, | Performed by: INTERNAL MEDICINE

## 2023-03-13 PROCEDURE — 3066F NEPHROPATHY DOC TX: CPT | Mod: CPTII,S$GLB,, | Performed by: INTERNAL MEDICINE

## 2023-03-13 PROCEDURE — 1101F PT FALLS ASSESS-DOCD LE1/YR: CPT | Mod: CPTII,S$GLB,, | Performed by: INTERNAL MEDICINE

## 2023-03-13 PROCEDURE — 1126F AMNT PAIN NOTED NONE PRSNT: CPT | Mod: CPTII,S$GLB,, | Performed by: INTERNAL MEDICINE

## 2023-03-13 PROCEDURE — 3079F DIAST BP 80-89 MM HG: CPT | Mod: CPTII,S$GLB,, | Performed by: INTERNAL MEDICINE

## 2023-03-13 PROCEDURE — 3044F PR MOST RECENT HEMOGLOBIN A1C LEVEL <7.0%: ICD-10-PCS | Mod: CPTII,S$GLB,, | Performed by: INTERNAL MEDICINE

## 2023-03-13 PROCEDURE — 1160F RVW MEDS BY RX/DR IN RCRD: CPT | Mod: CPTII,S$GLB,, | Performed by: INTERNAL MEDICINE

## 2023-03-13 PROCEDURE — 1160F PR REVIEW ALL MEDS BY PRESCRIBER/CLIN PHARMACIST DOCUMENTED: ICD-10-PCS | Mod: CPTII,S$GLB,, | Performed by: INTERNAL MEDICINE

## 2023-03-13 PROCEDURE — 3008F BODY MASS INDEX DOCD: CPT | Mod: CPTII,S$GLB,, | Performed by: INTERNAL MEDICINE

## 2023-03-13 PROCEDURE — 3077F SYST BP >= 140 MM HG: CPT | Mod: CPTII,S$GLB,, | Performed by: INTERNAL MEDICINE

## 2023-03-13 PROCEDURE — 1159F PR MEDICATION LIST DOCUMENTED IN MEDICAL RECORD: ICD-10-PCS | Mod: CPTII,S$GLB,, | Performed by: INTERNAL MEDICINE

## 2023-03-13 PROCEDURE — 3008F PR BODY MASS INDEX (BMI) DOCUMENTED: ICD-10-PCS | Mod: CPTII,S$GLB,, | Performed by: INTERNAL MEDICINE

## 2023-03-13 PROCEDURE — 3066F PR DOCUMENTATION OF TREATMENT FOR NEPHROPATHY: ICD-10-PCS | Mod: CPTII,S$GLB,, | Performed by: INTERNAL MEDICINE

## 2023-03-13 PROCEDURE — 1111F PR DISCHARGE MEDS RECONCILED W/ CURRENT OUTPATIENT MED LIST: ICD-10-PCS | Mod: CPTII,S$GLB,, | Performed by: INTERNAL MEDICINE

## 2023-03-13 PROCEDURE — 1159F MED LIST DOCD IN RCRD: CPT | Mod: CPTII,S$GLB,, | Performed by: INTERNAL MEDICINE

## 2023-03-13 PROCEDURE — 1101F PR PT FALLS ASSESS DOC 0-1 FALLS W/OUT INJ PAST YR: ICD-10-PCS | Mod: CPTII,S$GLB,, | Performed by: INTERNAL MEDICINE

## 2023-03-13 PROCEDURE — 3288F PR FALLS RISK ASSESSMENT DOCUMENTED: ICD-10-PCS | Mod: CPTII,S$GLB,, | Performed by: INTERNAL MEDICINE

## 2023-03-13 NOTE — PROGRESS NOTES
Subjective:       Patient ID: Jarrett Charlton Jr. is a 72 y.o. male.    Chief Complaint: Annual Exam and Hospital Follow Up    HPI  Pt visit today for routine f/u he ha smultiple medical condistions  CRI f/u with nephrology sbo s/o colostomy with with high output colostomy with recurrent dehydration hypotension he is on IVF qweek as OP  CAD s/p stent LAD DM  prostate CA parosymal VT had ILR placementpt was treated by cardilogist Dr Robert allan ill see Dr Berry since DR Jones left [facility]. Pt currently doing well no sob cp dizziness BP better since taken off multiple meds denies sob cp WILSON colostomy functioning well and getting IVF every friday  Review of Systems    Objective:      Physical Exam  Vitals and nursing note reviewed.   Constitutional:       General: He is not in acute distress.     Appearance: He is well-developed.   HENT:      Head: Normocephalic and atraumatic.      Right Ear: External ear normal.      Left Ear: External ear normal.      Nose: Nose normal.   Eyes:      Conjunctiva/sclera: Conjunctivae normal.      Pupils: Pupils are equal, round, and reactive to light.   Neck:      Thyroid: No thyromegaly.   Cardiovascular:      Rate and Rhythm: Normal rate and regular rhythm.      Heart sounds: Normal heart sounds. No murmur heard.    No friction rub. No gallop.   Pulmonary:      Effort: Pulmonary effort is normal. No respiratory distress.      Breath sounds: Normal breath sounds. No wheezing.   Abdominal:      General: Bowel sounds are normal. There is no distension.      Palpations: Abdomen is soft.      Tenderness: There is no abdominal tenderness.   Musculoskeletal:         General: No tenderness or deformity. Normal range of motion.      Cervical back: Normal range of motion and neck supple.   Lymphadenopathy:      Cervical: No cervical adenopathy.   Skin:     General: Skin is warm and dry.      Findings: No erythema or rash.   Neurological:      Mental Status: He is alert and oriented to  person, place, and time.   Psychiatric:         Mood and Affect: Mood normal.         Thought Content: Thought content normal.         Judgment: Judgment normal.       Assessment:       1. Type 2 diabetes mellitus with stage 3b chronic kidney disease, without long-term current use of insulin    2. Stage 3b chronic kidney disease    3. Essential hypertension    4. Orthostatic hypotension    5. Coronary artery disease involving native coronary artery of native heart without angina pectoris          Plan:       Type 2 diabetes mellitus with stage 3b chronic kidney disease, without long-term current use of insulin  Comments:  fairly controlled Hgba1c 6.6  Orders:  -     Diabetic Eye Screening Photo; Future  -     Diabetic Eye Screening Photo; Future  -     Lipid Panel; Future; Expected date: 03/13/2023  -     Magnesium; Future; Expected date: 03/13/2023    Stage 3b chronic kidney disease  Comments:  stable f/u with nephrology    Essential hypertension  Comments:  fred richey systolic BP will recheck     Orthostatic hypotension  Comments:  better since taken off multiple meds continue with weekly IVF    Coronary artery disease involving native coronary artery of native heart without angina pectoris  Comments:  stable had stent mid LAD patent last cardiac cath f/u with cardiology        Medication List with Changes/Refills   Current Medications    ACCU-CHEK PAUL CONTROL SOLN SOLN    1 drop by NOT APPLICABLE route once daily.    ACCU-CHEK SOFTCLIX LANCETS MISC    Accucheck tid    ALLOPURINOL (ZYLOPRIM) 300 MG TABLET    Take 1.5 tablets (450 mg total) by mouth once daily.    ASPIRIN (ECOTRIN) 81 MG EC TABLET    Take 81 mg by mouth once daily.    BD ALCOHOL SWABS PADM        BLOOD SUGAR DIAGNOSTIC (ONETOUCH ULTRA TEST) STRP    USE ONE STRIP TO CHECK GLUCOSE EVERY DAY    BLOOD-GLUCOSE METER (ACCU-CHEK GUIDE GLUCOSE METER) [facility]    Accucheck BID    BRIMONIDINE 0.2% (ALPHAGAN) 0.2 % DROP    INSTILL 1 DROP INTO EACH EYE TWICE  DAILY    CALCIUM CITRATE-VITAMIN D3 (CITRACAL + D MAXIMUM) 315 MG-6.25 MCG (250 UNIT) TAB    Take 1 tablet by mouth once daily.    CHOLESTYRAMINE (QUESTRAN) 4 GRAM PACKET    Take 1 packet (4 g total) by mouth 3 (three) times daily with meals.    FIBER, CALCIUM POLYCARBOPHIL, 625 MG TABLET    Take 3 tablets by mouth before dinner.    FLUDROCORTISONE (FLORINEF) 0.1 MG TAB    Take 1 tablet (100 mcg total) by mouth once daily.    GLIMEPIRIDE (AMARYL) 4 MG TABLET    Take 4 mg by mouth 2 (two) times daily before meals.    HYDRALAZINE (APRESOLINE) 10 MG TABLET    Take 1 tablet (10 mg total) by mouth 3 (three) times daily as needed (Systolic blood pressure > 180).    METOPROLOL SUCCINATE (TOPROL-XL) 25 MG 24 HR TABLET    Take 1 tablet (25 mg total) by mouth once daily.    MIDODRINE (PROAMATINE) 10 MG TABLET    Take 1 tablet (10 mg total) by mouth daily as needed (Hypotension).    PANTOPRAZOLE (PROTONIX) 40 MG TABLET    Take 40 mg by mouth once daily.    PRAVASTATIN (PRAVACHOL) 80 MG TABLET    Take 1 tablet (80 mg total) by mouth once daily.   Discontinued Medications    OXYCODONE (ROXICODONE) 5 MG IMMEDIATE RELEASE TABLET    Take 1 tablet (5 mg total) by mouth every 4 (four) hours as needed for Pain.    OXYCODONE-ACETAMINOPHEN (PERCOCET) 5-325 MG PER TABLET    Take 1 tablet by mouth every 8 (eight) hours as needed for Pain.    POLYETHYLENE GLYCOL (GLYCOLAX) 17 GRAM/DOSE POWDER    Take 17 g by mouth 2 (two) times daily as needed (constipation).

## 2023-03-14 ENCOUNTER — TELEPHONE (OUTPATIENT)
Dept: PRIMARY CARE CLINIC | Facility: CLINIC | Age: 73
End: 2023-03-14
Payer: MEDICARE

## 2023-03-14 NOTE — TELEPHONE ENCOUNTER
----- Message from Zenia Hadley sent at 3/14/2023  2:14 PM CDT -----  Contact: [facility] supplies Don [phone]  Don is calling to get a verbal order.

## 2023-03-15 DIAGNOSIS — E83.42 HYPOMAGNESEMIA: Primary | ICD-10-CM

## 2023-03-15 RX ORDER — CALCIUM CARBONATE 300MG(750)
TABLET,CHEWABLE ORAL
Qty: 30 TABLET | Refills: 2 | Status: SHIPPED | OUTPATIENT
Start: 2023-03-15 | End: 2023-06-02 | Stop reason: SDUPTHER

## 2023-03-16 ENCOUNTER — TELEPHONE (OUTPATIENT)
Dept: PRIMARY CARE CLINIC | Facility: CLINIC | Age: 73
End: 2023-03-16
Payer: MEDICARE

## 2023-03-16 NOTE — TELEPHONE ENCOUNTER
----- Message from Hue Simmons sent at 3/16/2023  9:12 AM CDT -----  Contact: Ms. Gustafson @ [facility] Phone# [phone]  Ms. Gustafson at  [facility] would like a call back in regards to her wanting to know what is the status on the order that she have placed on March fourteenth for the patient Ostomy supplies?

## 2023-03-16 NOTE — TELEPHONE ENCOUNTER
Spoke with Dwight from [employer] in regards to orders. She was informed that we have not received any orders for the patient. She said she will fax over orders to us.

## 2023-03-17 ENCOUNTER — INFUSION (OUTPATIENT)
Dept: INFECTIOUS DISEASES | Facility: HOSPITAL | Age: 73
End: 2023-03-17
Attending: INTERNAL MEDICINE
Payer: MEDICARE

## 2023-03-17 VITALS
RESPIRATION RATE: 18 BRPM | BODY MASS INDEX: 26.96 KG/M2 | DIASTOLIC BLOOD PRESSURE: 75 MMHG | OXYGEN SATURATION: 96 % | SYSTOLIC BLOOD PRESSURE: 128 MMHG | HEART RATE: 78 BPM | WEIGHT: 210 LBS | TEMPERATURE: 97 F

## 2023-03-17 DIAGNOSIS — E11.22 TYPE 2 DIABETES MELLITUS WITH STAGE 3B CHRONIC KIDNEY DISEASE, WITHOUT LONG-TERM CURRENT USE OF INSULIN: ICD-10-CM

## 2023-03-17 DIAGNOSIS — N18.32 TYPE 2 DIABETES MELLITUS WITH STAGE 3B CHRONIC KIDNEY DISEASE, WITHOUT LONG-TERM CURRENT USE OF INSULIN: ICD-10-CM

## 2023-03-17 DIAGNOSIS — E86.0 DEHYDRATION: Primary | ICD-10-CM

## 2023-03-17 DIAGNOSIS — Z93.3 COLOSTOMY PRESENT: ICD-10-CM

## 2023-03-17 PROCEDURE — 25000003 PHARM REV CODE 250: Performed by: NURSE PRACTITIONER

## 2023-03-17 PROCEDURE — 96360 HYDRATION IV INFUSION INIT: CPT

## 2023-03-17 RX ORDER — SODIUM CHLORIDE 0.9 % (FLUSH) 0.9 %
10 SYRINGE (ML) INJECTION
Status: CANCELLED | OUTPATIENT
Start: 2023-03-24

## 2023-03-17 RX ORDER — HEPARIN 100 UNIT/ML
500 SYRINGE INTRAVENOUS
Status: CANCELLED | OUTPATIENT
Start: 2023-03-24

## 2023-03-17 RX ADMIN — SODIUM CHLORIDE 1000 ML: 9 INJECTION, SOLUTION INTRAVENOUS at 08:03

## 2023-03-20 DIAGNOSIS — I95.1 SYNCOPE DUE TO ORTHOSTATIC HYPOTENSION: ICD-10-CM

## 2023-03-20 DIAGNOSIS — I47.20 VT (VENTRICULAR TACHYCARDIA): ICD-10-CM

## 2023-03-20 RX ORDER — GABAPENTIN 600 MG/1
600 TABLET ORAL 3 TIMES DAILY
Qty: 270 TABLET | Refills: 3 | Status: ON HOLD | OUTPATIENT
Start: 2023-03-20 | End: 2023-05-29 | Stop reason: HOSPADM

## 2023-03-20 RX ORDER — FLUDROCORTISONE ACETATE 0.1 MG/1
100 TABLET ORAL DAILY
Qty: 30 TABLET | Refills: 2
Start: 2023-03-20 | End: 2023-04-01

## 2023-03-20 RX ORDER — ALLOPURINOL 300 MG/1
450 TABLET ORAL DAILY
Qty: 135 TABLET | Refills: 3 | Status: ON HOLD | OUTPATIENT
Start: 2023-03-20 | End: 2023-11-28 | Stop reason: HOSPADM

## 2023-03-20 NOTE — TELEPHONE ENCOUNTER
No new care gaps identified.  [facility] Embedded Care Gaps. Reference number: [id number]. 3/20/2023   2:02:15 PM CDT

## 2023-03-20 NOTE — TELEPHONE ENCOUNTER
----- Message from Radha Ashraf sent at 3/20/2023  1:52 PM CDT -----  Contact: self/[phone]  Requesting an RX refill or new RX.  Is this a refill or new RX: new   RX name and strength (copy/paste from chart):  fludrocortisone (FLORINEF) 0.1 mg Tab 30 tablet 0 2/22/2023 3/24/2023 No  Sig - Route: Take 1 tablet (100 mcg total) by mouth once daily.   Is this a 30 day or 90 day RX:   Pharmacy name and phone # (copy/paste from chart):   [facility] - [place], [place] - [address]  [address] [address]  Phone: [phone] Fax: [phone]  the doctors have asked that we provide their patients with the following 2 reminders -- prescription refills can take up to 72 hours, and a friendly reminder that in the future you can use your [facility] account to request refills: call back    Please advise

## 2023-03-20 NOTE — TELEPHONE ENCOUNTER
----- Message from Richa Flores sent at 3/20/2023  1:24 PM CDT -----  Regarding: refill  Consult/Advisory    Name Of Caller:Jarrett      Contact Preference:[phone]    Nature of call: Pt called regarding a refill on metoprolol succinate (TOPROL-XL) 25 MG 24 hr tablet     Pharmacy is [facility]  [phone] and [phone]     out of season (available sept 1 thru apr 2 only)

## 2023-03-20 NOTE — TELEPHONE ENCOUNTER
----- Message from Alma Lopez CMA sent at 3/20/2023  1:37 PM CDT -----  Regarding: Refill request  Contact: [phone]  Pt refill request 3 month supply    Gabapentin 600 mg   Allopurinol 300 mg       [facility] - [place], [place] - [address]  [address]  [facility] [address]  Phone: [phone] Fax: [phone]    Thank you

## 2023-03-21 RX ORDER — METOPROLOL SUCCINATE 25 MG/1
25 TABLET, EXTENDED RELEASE ORAL DAILY
Qty: 90 TABLET | Refills: 3 | Status: ON HOLD | OUTPATIENT
Start: 2023-03-21 | End: 2023-05-29 | Stop reason: SDUPTHER

## 2023-03-22 ENCOUNTER — TELEPHONE (OUTPATIENT)
Dept: CARDIOLOGY | Facility: CLINIC | Age: 73
End: 2023-03-22
Payer: MEDICARE

## 2023-03-22 NOTE — TELEPHONE ENCOUNTER
Spoke with patient, states [facility] did not receive prescription and he has about 8 left.  Informed that I will contact [facility] regarding prescription.  Patient verbalized understanding.    Spoke with Amy at [facility], they did receive prescription.  Informed that patient has about 8 tablets left.  She placed the request as a priority.    Spoke with patient, informed of above.  Patient verbalized understanding.

## 2023-03-22 NOTE — TELEPHONE ENCOUNTER
----- Message from Adriana Shine sent at 3/22/2023  9:20 AM CDT -----  Regarding: Prescription Issues  Pt called about his prescription for metoprolol succinate (TOPROL-XL) 25 MG 24 hr tablet and pharmacy stating they do not have prescription. Pt states he will run out medication soon.    Requesting call [phone]

## 2023-03-24 ENCOUNTER — TELEPHONE (OUTPATIENT)
Dept: CARDIOLOGY | Facility: CLINIC | Age: 73
End: 2023-03-24
Payer: MEDICARE

## 2023-03-24 ENCOUNTER — INFUSION (OUTPATIENT)
Dept: INFECTIOUS DISEASES | Facility: HOSPITAL | Age: 73
End: 2023-03-24
Payer: MEDICARE

## 2023-03-24 VITALS
BODY MASS INDEX: 27.22 KG/M2 | WEIGHT: 212 LBS | DIASTOLIC BLOOD PRESSURE: 98 MMHG | RESPIRATION RATE: 18 BRPM | HEART RATE: 73 BPM | OXYGEN SATURATION: 96 % | TEMPERATURE: 97 F | SYSTOLIC BLOOD PRESSURE: 189 MMHG

## 2023-03-24 DIAGNOSIS — E86.0 DEHYDRATION: Primary | ICD-10-CM

## 2023-03-24 DIAGNOSIS — N18.32 TYPE 2 DIABETES MELLITUS WITH STAGE 3B CHRONIC KIDNEY DISEASE, WITHOUT LONG-TERM CURRENT USE OF INSULIN: ICD-10-CM

## 2023-03-24 DIAGNOSIS — E11.22 TYPE 2 DIABETES MELLITUS WITH STAGE 3B CHRONIC KIDNEY DISEASE, WITHOUT LONG-TERM CURRENT USE OF INSULIN: ICD-10-CM

## 2023-03-24 DIAGNOSIS — Z93.3 COLOSTOMY PRESENT: ICD-10-CM

## 2023-03-24 PROCEDURE — 96360 HYDRATION IV INFUSION INIT: CPT | Mod: HCNC

## 2023-03-24 PROCEDURE — 25000003 PHARM REV CODE 250: Performed by: NURSE PRACTITIONER

## 2023-03-24 RX ORDER — SODIUM CHLORIDE 0.9 % (FLUSH) 0.9 %
10 SYRINGE (ML) INJECTION
Status: CANCELLED | OUTPATIENT
Start: 2023-03-31

## 2023-03-24 RX ORDER — HEPARIN 100 UNIT/ML
500 SYRINGE INTRAVENOUS
Status: CANCELLED | OUTPATIENT
Start: 2023-03-31

## 2023-03-24 RX ADMIN — SODIUM CHLORIDE 1000 ML: 9 INJECTION, SOLUTION INTRAVENOUS at 08:03

## 2023-03-24 NOTE — TELEPHONE ENCOUNTER
LOV 03/02/2023    Spoke with patient, the last two days in the evening blood pressure has been 240s/105s.  He has taken the Hydralazine without relief.  This morning ~ 5:00 AM /102.  He had infusion this morning at [facility], /98.    Recommendations?

## 2023-03-24 NOTE — TELEPHONE ENCOUNTER
----- Message from Natanael Arias MA sent at 3/24/2023  9:37 AM CDT -----  Regarding: Elevated BP  Contact: [phone]  Patient complains of elevated BP for two days yesterday evening 240/105    190/100 this morning with headache. Please call and advise the patient.

## 2023-03-25 NOTE — TELEPHONE ENCOUNTER
Discussed blood pressure log.  Informed patient that if his systolic blood pressure is over 200 he should go to the ER immediately.  When his systolic blood pressure is 180-190 he may take 2 hydralazine 10 mg tablets (which he did and his blood pressure near normalized).  Current blood pressure in the 120s.  The patient has had no symptoms.  He will call me with any concerns.

## 2023-03-28 DIAGNOSIS — E11.22 TYPE 2 DIABETES MELLITUS WITH STAGE 3B CHRONIC KIDNEY DISEASE, WITHOUT LONG-TERM CURRENT USE OF INSULIN: ICD-10-CM

## 2023-03-28 DIAGNOSIS — I95.1 SYNCOPE DUE TO ORTHOSTATIC HYPOTENSION: ICD-10-CM

## 2023-03-28 DIAGNOSIS — N18.32 TYPE 2 DIABETES MELLITUS WITH STAGE 3B CHRONIC KIDNEY DISEASE, WITHOUT LONG-TERM CURRENT USE OF INSULIN: ICD-10-CM

## 2023-03-28 RX ORDER — AMIODARONE HYDROCHLORIDE 200 MG/1
TABLET ORAL
Refills: 0 | OUTPATIENT
Start: 2023-03-28

## 2023-03-28 RX ORDER — CLOPIDOGREL BISULFATE 75 MG/1
TABLET ORAL
Refills: 0 | OUTPATIENT
Start: 2023-03-28

## 2023-03-28 RX ORDER — PANTOPRAZOLE SODIUM 40 MG/1
TABLET, DELAYED RELEASE ORAL
Refills: 0 | OUTPATIENT
Start: 2023-03-28

## 2023-03-28 RX ORDER — ISOPROPYL ALCOHOL 70 ML/100ML
SWAB TOPICAL
Refills: 0 | OUTPATIENT
Start: 2023-03-28

## 2023-03-28 RX ORDER — GLIMEPIRIDE 4 MG/1
TABLET ORAL
Refills: 0 | OUTPATIENT
Start: 2023-03-28

## 2023-03-28 RX ORDER — MIDODRINE HYDROCHLORIDE 10 MG/1
TABLET ORAL
Refills: 0 | OUTPATIENT
Start: 2023-03-28

## 2023-03-28 RX ORDER — LANCETS
EACH MISCELLANEOUS
Refills: 0 | OUTPATIENT
Start: 2023-03-28

## 2023-03-28 NOTE — TELEPHONE ENCOUNTER
Refill Decision Note   Jarrett Charlton  is requesting a refill authorization.  Brief Assessment and Rationale for Refill:  Quick Discontinue     Medication Therapy Plan:      Pharmacy is requesting new scripts for the following medications without required information, (sig/ frequency/qty/etc)      Medication Reconciliation Completed: No     Comments: Pharmacies have been requesting medications for patients without required information, (sig, frequency, qty, etc.). In addition, requests are sent for medication(s) pt. are currently not taking, and medications patients have never taken.    We have spoken to the pharmacies about these request types and advised their teams previously that we are unable to assess these New Script requests and require all details for these requests. This is a known issue and has been reported.     Note composed:1:52 PM 03/28/2023

## 2023-03-28 NOTE — TELEPHONE ENCOUNTER
No new care gaps identified.  [facility] Embedded Care Gaps. Reference number: [id number]. 3/28/2023   1:49:15 PM CDT

## 2023-03-29 ENCOUNTER — TELEPHONE (OUTPATIENT)
Dept: NEPHROLOGY | Facility: CLINIC | Age: 73
End: 2023-03-29
Payer: MEDICARE

## 2023-03-29 DIAGNOSIS — N18.4 CHRONIC KIDNEY DISEASE, STAGE 4 (SEVERE): Primary | ICD-10-CM

## 2023-03-29 NOTE — TELEPHONE ENCOUNTER
----- Message from Bianka Arevalo, NP sent at 3/29/2023  4:00 PM CDT -----  Pls call pt to spot check labs 4/10. BMP in. thanks

## 2023-03-31 ENCOUNTER — INFUSION (OUTPATIENT)
Dept: INFECTIOUS DISEASES | Facility: HOSPITAL | Age: 73
End: 2023-03-31
Payer: MEDICARE

## 2023-03-31 VITALS
SYSTOLIC BLOOD PRESSURE: 188 MMHG | DIASTOLIC BLOOD PRESSURE: 92 MMHG | HEART RATE: 70 BPM | OXYGEN SATURATION: 99 % | TEMPERATURE: 99 F | BODY MASS INDEX: 27.03 KG/M2 | WEIGHT: 210.56 LBS | RESPIRATION RATE: 18 BRPM

## 2023-03-31 DIAGNOSIS — E11.22 TYPE 2 DIABETES MELLITUS WITH STAGE 3B CHRONIC KIDNEY DISEASE, WITHOUT LONG-TERM CURRENT USE OF INSULIN: ICD-10-CM

## 2023-03-31 DIAGNOSIS — E86.0 DEHYDRATION: Primary | ICD-10-CM

## 2023-03-31 DIAGNOSIS — N18.32 TYPE 2 DIABETES MELLITUS WITH STAGE 3B CHRONIC KIDNEY DISEASE, WITHOUT LONG-TERM CURRENT USE OF INSULIN: ICD-10-CM

## 2023-03-31 DIAGNOSIS — Z93.3 COLOSTOMY PRESENT: ICD-10-CM

## 2023-03-31 PROCEDURE — 25000003 PHARM REV CODE 250: Mod: HCNC | Performed by: NURSE PRACTITIONER

## 2023-03-31 PROCEDURE — 96360 HYDRATION IV INFUSION INIT: CPT | Mod: HCNC

## 2023-03-31 RX ORDER — SODIUM CHLORIDE 0.9 % (FLUSH) 0.9 %
10 SYRINGE (ML) INJECTION
Status: CANCELLED | OUTPATIENT
Start: 2023-04-07

## 2023-03-31 RX ORDER — HEPARIN 100 UNIT/ML
500 SYRINGE INTRAVENOUS
Status: CANCELLED | OUTPATIENT
Start: 2023-04-07

## 2023-03-31 RX ADMIN — SODIUM CHLORIDE 1000 ML: 9 INJECTION, SOLUTION INTRAVENOUS at 08:03

## 2023-04-04 DIAGNOSIS — I95.1 SYNCOPE DUE TO ORTHOSTATIC HYPOTENSION: ICD-10-CM

## 2023-04-04 DIAGNOSIS — N18.32 TYPE 2 DIABETES MELLITUS WITH STAGE 3B CHRONIC KIDNEY DISEASE, WITHOUT LONG-TERM CURRENT USE OF INSULIN: ICD-10-CM

## 2023-04-04 DIAGNOSIS — E11.22 TYPE 2 DIABETES MELLITUS WITH STAGE 3B CHRONIC KIDNEY DISEASE, WITHOUT LONG-TERM CURRENT USE OF INSULIN: ICD-10-CM

## 2023-04-04 RX ORDER — ISOPROPYL ALCOHOL 0.75 G/1
1 SWAB TOPICAL
Qty: 300 EACH | Refills: 2 | Status: SHIPPED | OUTPATIENT
Start: 2023-04-04 | End: 2023-06-16

## 2023-04-04 RX ORDER — MIDODRINE HYDROCHLORIDE 10 MG/1
10 TABLET ORAL DAILY PRN
Qty: 30 TABLET | Refills: 0
Start: 2023-04-04 | End: 2023-09-20 | Stop reason: SDUPTHER

## 2023-04-04 RX ORDER — GLIMEPIRIDE 4 MG/1
4 TABLET ORAL
Qty: 90 TABLET | Refills: 1 | Status: SHIPPED | OUTPATIENT
Start: 2023-04-04 | End: 2023-06-16

## 2023-04-04 RX ORDER — LANCETS
EACH MISCELLANEOUS
Qty: 200 EACH | Refills: 1 | Status: SHIPPED | OUTPATIENT
Start: 2023-04-04 | End: 2023-06-01

## 2023-04-04 RX ORDER — PANTOPRAZOLE SODIUM 40 MG/1
40 TABLET, DELAYED RELEASE ORAL DAILY
Qty: 90 TABLET | Refills: 0 | Status: ON HOLD | OUTPATIENT
Start: 2023-04-04 | End: 2023-05-29 | Stop reason: SDUPTHER

## 2023-04-04 NOTE — TELEPHONE ENCOUNTER
No new care gaps identified.  [facility] Embedded Care Gaps. Reference number: [id number]. 4/04/2023   11:03:04 AM TRINA

## 2023-04-05 ENCOUNTER — INFUSION (OUTPATIENT)
Dept: INFECTIOUS DISEASES | Facility: HOSPITAL | Age: 73
End: 2023-04-05
Payer: MEDICARE

## 2023-04-05 VITALS
DIASTOLIC BLOOD PRESSURE: 80 MMHG | SYSTOLIC BLOOD PRESSURE: 164 MMHG | WEIGHT: 212.31 LBS | OXYGEN SATURATION: 96 % | BODY MASS INDEX: 27.26 KG/M2 | TEMPERATURE: 98 F | HEART RATE: 69 BPM | RESPIRATION RATE: 16 BRPM

## 2023-04-05 DIAGNOSIS — E11.22 TYPE 2 DIABETES MELLITUS WITH STAGE 3B CHRONIC KIDNEY DISEASE, WITHOUT LONG-TERM CURRENT USE OF INSULIN: ICD-10-CM

## 2023-04-05 DIAGNOSIS — E86.0 DEHYDRATION: Primary | ICD-10-CM

## 2023-04-05 DIAGNOSIS — N18.32 TYPE 2 DIABETES MELLITUS WITH STAGE 3B CHRONIC KIDNEY DISEASE, WITHOUT LONG-TERM CURRENT USE OF INSULIN: ICD-10-CM

## 2023-04-05 DIAGNOSIS — Z93.3 COLOSTOMY PRESENT: ICD-10-CM

## 2023-04-05 PROCEDURE — 25000003 PHARM REV CODE 250: Mod: HCNC | Performed by: NURSE PRACTITIONER

## 2023-04-05 PROCEDURE — 96360 HYDRATION IV INFUSION INIT: CPT | Mod: HCNC

## 2023-04-05 RX ORDER — HEPARIN 100 UNIT/ML
500 SYRINGE INTRAVENOUS
Status: CANCELLED | OUTPATIENT
Start: 2023-04-07

## 2023-04-05 RX ORDER — SODIUM CHLORIDE 0.9 % (FLUSH) 0.9 %
10 SYRINGE (ML) INJECTION
Status: CANCELLED | OUTPATIENT
Start: 2023-04-07

## 2023-04-05 RX ADMIN — SODIUM CHLORIDE 1000 ML: 9 INJECTION, SOLUTION INTRAVENOUS at 08:04

## 2023-04-09 ENCOUNTER — CLINICAL SUPPORT (OUTPATIENT)
Dept: CARDIOLOGY | Facility: HOSPITAL | Age: 73
End: 2023-04-09
Payer: MEDICARE

## 2023-04-09 DIAGNOSIS — Z95.818 PRESENCE OF OTHER CARDIAC IMPLANTS AND GRAFTS: ICD-10-CM

## 2023-04-09 PROCEDURE — 93298 REM INTERROG DEV EVAL SCRMS: CPT | Mod: HCNC,,, | Performed by: INTERNAL MEDICINE

## 2023-04-09 PROCEDURE — 93298 CARDIAC DEVICE CHECK - REMOTE: ICD-10-PCS | Mod: HCNC,,, | Performed by: INTERNAL MEDICINE

## 2023-04-09 PROCEDURE — G2066 INTER DEVC REMOTE 30D: HCPCS | Mod: HCNC | Performed by: INTERNAL MEDICINE

## 2023-04-12 ENCOUNTER — TELEPHONE (OUTPATIENT)
Dept: NEPHROLOGY | Facility: CLINIC | Age: 73
End: 2023-04-12
Payer: MEDICARE

## 2023-04-12 NOTE — TELEPHONE ENCOUNTER
----- Message from Bianka Arevalo NP sent at 4/12/2023  1:05 PM CDT -----  Pls let him know kidney function is stable, around 29%.

## 2023-04-14 ENCOUNTER — INFUSION (OUTPATIENT)
Dept: INFECTIOUS DISEASES | Facility: HOSPITAL | Age: 73
End: 2023-04-14
Attending: INTERNAL MEDICINE
Payer: MEDICARE

## 2023-04-14 VITALS
TEMPERATURE: 98 F | BODY MASS INDEX: 27.39 KG/M2 | DIASTOLIC BLOOD PRESSURE: 90 MMHG | WEIGHT: 213.31 LBS | RESPIRATION RATE: 18 BRPM | OXYGEN SATURATION: 98 % | HEART RATE: 72 BPM | SYSTOLIC BLOOD PRESSURE: 174 MMHG

## 2023-04-14 DIAGNOSIS — E86.0 DEHYDRATION: Primary | ICD-10-CM

## 2023-04-14 DIAGNOSIS — N18.32 TYPE 2 DIABETES MELLITUS WITH STAGE 3B CHRONIC KIDNEY DISEASE, WITHOUT LONG-TERM CURRENT USE OF INSULIN: ICD-10-CM

## 2023-04-14 DIAGNOSIS — E11.22 TYPE 2 DIABETES MELLITUS WITH STAGE 3B CHRONIC KIDNEY DISEASE, WITHOUT LONG-TERM CURRENT USE OF INSULIN: ICD-10-CM

## 2023-04-14 DIAGNOSIS — Z93.3 COLOSTOMY PRESENT: ICD-10-CM

## 2023-04-14 PROCEDURE — 25000003 PHARM REV CODE 250: Mod: HCNC | Performed by: NURSE PRACTITIONER

## 2023-04-14 PROCEDURE — 96360 HYDRATION IV INFUSION INIT: CPT | Mod: HCNC

## 2023-04-14 RX ORDER — SODIUM CHLORIDE 0.9 % (FLUSH) 0.9 %
10 SYRINGE (ML) INJECTION
Status: CANCELLED | OUTPATIENT
Start: 2023-04-21

## 2023-04-14 RX ORDER — HEPARIN 100 UNIT/ML
500 SYRINGE INTRAVENOUS
Status: CANCELLED | OUTPATIENT
Start: 2023-04-21

## 2023-04-14 RX ADMIN — SODIUM CHLORIDE 1000 ML: 9 INJECTION, SOLUTION INTRAVENOUS at 08:04

## 2023-04-16 DIAGNOSIS — I10 ESSENTIAL HYPERTENSION: Primary | ICD-10-CM

## 2023-04-16 DIAGNOSIS — I25.10 CORONARY ARTERY DISEASE INVOLVING NATIVE CORONARY ARTERY OF NATIVE HEART WITHOUT ANGINA PECTORIS: ICD-10-CM

## 2023-04-17 RX ORDER — PRAVASTATIN SODIUM 80 MG/1
TABLET ORAL
Qty: 90 TABLET | Refills: 0 | OUTPATIENT
Start: 2023-04-17 | End: 2023-04-22

## 2023-04-21 ENCOUNTER — INFUSION (OUTPATIENT)
Dept: INFECTIOUS DISEASES | Facility: HOSPITAL | Age: 73
End: 2023-04-21
Payer: MEDICARE

## 2023-04-21 VITALS
BODY MASS INDEX: 27.19 KG/M2 | DIASTOLIC BLOOD PRESSURE: 85 MMHG | HEART RATE: 62 BPM | SYSTOLIC BLOOD PRESSURE: 172 MMHG | TEMPERATURE: 98 F | WEIGHT: 211.75 LBS | OXYGEN SATURATION: 99 %

## 2023-04-21 DIAGNOSIS — N18.32 TYPE 2 DIABETES MELLITUS WITH STAGE 3B CHRONIC KIDNEY DISEASE, WITHOUT LONG-TERM CURRENT USE OF INSULIN: ICD-10-CM

## 2023-04-21 DIAGNOSIS — E11.22 TYPE 2 DIABETES MELLITUS WITH STAGE 3B CHRONIC KIDNEY DISEASE, WITHOUT LONG-TERM CURRENT USE OF INSULIN: ICD-10-CM

## 2023-04-21 DIAGNOSIS — E86.0 DEHYDRATION: Primary | ICD-10-CM

## 2023-04-21 DIAGNOSIS — Z93.3 COLOSTOMY PRESENT: ICD-10-CM

## 2023-04-21 PROCEDURE — 25000003 PHARM REV CODE 250: Mod: HCNC | Performed by: NURSE PRACTITIONER

## 2023-04-21 PROCEDURE — 96360 HYDRATION IV INFUSION INIT: CPT | Mod: HCNC

## 2023-04-21 RX ORDER — HEPARIN 100 UNIT/ML
500 SYRINGE INTRAVENOUS
Status: CANCELLED | OUTPATIENT
Start: 2023-04-28

## 2023-04-21 RX ORDER — SODIUM CHLORIDE 0.9 % (FLUSH) 0.9 %
10 SYRINGE (ML) INJECTION
Status: CANCELLED | OUTPATIENT
Start: 2023-04-28

## 2023-04-21 RX ADMIN — SODIUM CHLORIDE 1000 ML: 9 INJECTION, SOLUTION INTRAVENOUS at 08:04

## 2023-04-24 ENCOUNTER — OFFICE VISIT (OUTPATIENT)
Dept: CARDIOLOGY | Facility: CLINIC | Age: 73
End: 2023-04-24
Payer: MEDICARE

## 2023-04-24 VITALS
HEART RATE: 79 BPM | SYSTOLIC BLOOD PRESSURE: 140 MMHG | BODY MASS INDEX: 26.99 KG/M2 | OXYGEN SATURATION: 97 % | WEIGHT: 210.31 LBS | HEIGHT: 74 IN | DIASTOLIC BLOOD PRESSURE: 92 MMHG

## 2023-04-24 DIAGNOSIS — I10 ESSENTIAL HYPERTENSION: ICD-10-CM

## 2023-04-24 PROCEDURE — 1159F MED LIST DOCD IN RCRD: CPT | Mod: HCNC,CPTII,S$GLB, | Performed by: INTERNAL MEDICINE

## 2023-04-24 PROCEDURE — 1101F PT FALLS ASSESS-DOCD LE1/YR: CPT | Mod: HCNC,CPTII,S$GLB, | Performed by: INTERNAL MEDICINE

## 2023-04-24 PROCEDURE — 3044F HG A1C LEVEL LT 7.0%: CPT | Mod: HCNC,CPTII,S$GLB, | Performed by: INTERNAL MEDICINE

## 2023-04-24 PROCEDURE — 3077F SYST BP >= 140 MM HG: CPT | Mod: HCNC,CPTII,S$GLB, | Performed by: INTERNAL MEDICINE

## 2023-04-24 PROCEDURE — 99999 PR PBB SHADOW E&M-EST. PATIENT-LVL III: ICD-10-PCS | Mod: PBBFAC,HCNC,, | Performed by: INTERNAL MEDICINE

## 2023-04-24 PROCEDURE — 3066F PR DOCUMENTATION OF TREATMENT FOR NEPHROPATHY: ICD-10-PCS | Mod: HCNC,CPTII,S$GLB, | Performed by: INTERNAL MEDICINE

## 2023-04-24 PROCEDURE — 1126F AMNT PAIN NOTED NONE PRSNT: CPT | Mod: HCNC,CPTII,S$GLB, | Performed by: INTERNAL MEDICINE

## 2023-04-24 PROCEDURE — 1126F PR PAIN SEVERITY QUANTIFIED, NO PAIN PRESENT: ICD-10-PCS | Mod: HCNC,CPTII,S$GLB, | Performed by: INTERNAL MEDICINE

## 2023-04-24 PROCEDURE — 1160F PR REVIEW ALL MEDS BY PRESCRIBER/CLIN PHARMACIST DOCUMENTED: ICD-10-PCS | Mod: HCNC,CPTII,S$GLB, | Performed by: INTERNAL MEDICINE

## 2023-04-24 PROCEDURE — 3288F PR FALLS RISK ASSESSMENT DOCUMENTED: ICD-10-PCS | Mod: HCNC,CPTII,S$GLB, | Performed by: INTERNAL MEDICINE

## 2023-04-24 PROCEDURE — 1101F PR PT FALLS ASSESS DOC 0-1 FALLS W/OUT INJ PAST YR: ICD-10-PCS | Mod: HCNC,CPTII,S$GLB, | Performed by: INTERNAL MEDICINE

## 2023-04-24 PROCEDURE — 3008F PR BODY MASS INDEX (BMI) DOCUMENTED: ICD-10-PCS | Mod: HCNC,CPTII,S$GLB, | Performed by: INTERNAL MEDICINE

## 2023-04-24 PROCEDURE — 99214 PR OFFICE/OUTPT VISIT, EST, LEVL IV, 30-39 MIN: ICD-10-PCS | Mod: HCNC,S$GLB,, | Performed by: INTERNAL MEDICINE

## 2023-04-24 PROCEDURE — 1160F RVW MEDS BY RX/DR IN RCRD: CPT | Mod: HCNC,CPTII,S$GLB, | Performed by: INTERNAL MEDICINE

## 2023-04-24 PROCEDURE — 99214 OFFICE O/P EST MOD 30 MIN: CPT | Mod: HCNC,S$GLB,, | Performed by: INTERNAL MEDICINE

## 2023-04-24 PROCEDURE — 3066F NEPHROPATHY DOC TX: CPT | Mod: HCNC,CPTII,S$GLB, | Performed by: INTERNAL MEDICINE

## 2023-04-24 PROCEDURE — 3080F DIAST BP >= 90 MM HG: CPT | Mod: HCNC,CPTII,S$GLB, | Performed by: INTERNAL MEDICINE

## 2023-04-24 PROCEDURE — 3077F PR MOST RECENT SYSTOLIC BLOOD PRESSURE >= 140 MM HG: ICD-10-PCS | Mod: HCNC,CPTII,S$GLB, | Performed by: INTERNAL MEDICINE

## 2023-04-24 PROCEDURE — 3008F BODY MASS INDEX DOCD: CPT | Mod: HCNC,CPTII,S$GLB, | Performed by: INTERNAL MEDICINE

## 2023-04-24 PROCEDURE — 3080F PR MOST RECENT DIASTOLIC BLOOD PRESSURE >= 90 MM HG: ICD-10-PCS | Mod: HCNC,CPTII,S$GLB, | Performed by: INTERNAL MEDICINE

## 2023-04-24 PROCEDURE — 3288F FALL RISK ASSESSMENT DOCD: CPT | Mod: HCNC,CPTII,S$GLB, | Performed by: INTERNAL MEDICINE

## 2023-04-24 PROCEDURE — 99999 PR PBB SHADOW E&M-EST. PATIENT-LVL III: CPT | Mod: PBBFAC,HCNC,, | Performed by: INTERNAL MEDICINE

## 2023-04-24 PROCEDURE — 1159F PR MEDICATION LIST DOCUMENTED IN MEDICAL RECORD: ICD-10-PCS | Mod: HCNC,CPTII,S$GLB, | Performed by: INTERNAL MEDICINE

## 2023-04-24 PROCEDURE — 3044F PR MOST RECENT HEMOGLOBIN A1C LEVEL <7.0%: ICD-10-PCS | Mod: HCNC,CPTII,S$GLB, | Performed by: INTERNAL MEDICINE

## 2023-04-24 RX ORDER — HYDRALAZINE HYDROCHLORIDE 10 MG/1
10 TABLET, FILM COATED ORAL 3 TIMES DAILY PRN
Qty: 270 TABLET | Refills: 0 | Status: ON HOLD | OUTPATIENT
Start: 2023-04-24 | End: 2023-05-29 | Stop reason: HOSPADM

## 2023-04-24 NOTE — TELEPHONE ENCOUNTER
----- Message from Lashell Weiner sent at 4/24/2023 12:10 PM CDT -----  Patent need a refill of this meds Brimonidine 0.2% opht sol 5 ml  goes to [facility] . Please call patient is need .

## 2023-04-24 NOTE — PROGRESS NOTES
" [facility] [address]  Cardiology Clinic Note      Chief Complaint  Chief Complaint   Patient presents with    Follow-up       HPI:    72-year-old male with a past medical history SBO, s/p colostomy, GERD, parotid mass s/p resection, BPH & prostate CA s/p TURP, CKD 4, pleural plaque, ("neurogenic") orthostatic hypotension, hypertension, hyperlipidemia, DM2, carotid stenosis not hemodynamically significant ultrasound 03/2023, ascending thoracic aorta 4.4 cm CT 2018, lower extremity arterial duplex 09/2022 no evidence of hemodynamically significant stenosis nonvisualization of the peroneal arteries prior SAKSHI 2019 normal, CAD status post PCI to mid LAD 2017 followed by cardiac catheterization 2021 patent stent nonobstructive disease prior MPI 2018 inferolateral ischemia, sustained ventricular tachycardia documented on discharge summary 4/2021 on amiodarone previous loop recorder implantation 2021 presumed link transmission showed several episodes of ventricular tachycardia with longest episode 36 beats also documented either atrial fibrillation RVR with aberrant conduction or AV radha reentry tachycardia (this was documented by an outside provider note scan 03/2021), echo 12/2022 normal EF mild LVH diastolic function indeterminate normal RV PASP 32+ CVP which could not be determined ascending aorta mildly dilated    Hospitalized with cholestatic jaundice s/p cholecystectomy with lysis of adhesions 2/14     Nephrology has IVF scheduled twice weekly secondary to losses from ostomy  Staying hydrated no NSAIDs     Neurology is following as well suspected autonomic dysfunction with orthostatic component      Last note from outside cardiology which states patient has pAF in note scan 2/3/2023    Seen by electrophysiology amiodarone was held and suggested loop recorder.  AF episodes seen on previous monitoring were most consistent with artifact.    Patient currently is managing his labile hypertension with p.r.n. " hydralazine but he is still taking Toprol for other indications    Recently check CMP with markedly elevated LFTs sent to ER and held statin already off amio  LFTs have come down it appears he was discharged from the ER directly    Medications  Current Outpatient Medications   Medication Sig Dispense Refill    ACCU-CHEK PAUL CONTROL SOLN Soln 1 drop by NOT APPLICABLE route once daily.      ACCU-CHEK SOFTCLIX LANCETS Misc Accucheck tid 200 each 1    allopurinoL (ZYLOPRIM) 300 MG tablet Take 1.5 tablets (450 mg total) by mouth once daily. 135 tablet 3    aspirin (ECOTRIN) 81 MG EC tablet Take 81 mg by mouth once daily.      BD ALCOHOL SWABS PadM Apply 1 each topically 5 (five) times daily. 300 each 2    blood sugar diagnostic (ONETOUCH ULTRA TEST) Strp USE ONE STRIP TO CHECK GLUCOSE EVERY  each 11    blood-glucose meter (ACCU-CHEK GUIDE GLUCOSE METER) [facility] [place] Accucheck BID 1 each 0    brimonidine 0.2% (ALPHAGAN) 0.2 % Drop INSTILL 1 DROP INTO EACH EYE TWICE DAILY 20 mL 0    calcium citrate-vitamin D3 (CITRACAL + D MAXIMUM) 315 mg-6.25 mcg (250 unit) Tab Take 1 tablet by mouth once daily. 120 tablet 3    cholestyramine (QUESTRAN) 4 gram packet Take 1 packet (4 g total) by mouth 3 (three) times daily with meals. 270 packet 3    FIBER, CALCIUM POLYCARBOPHIL, 625 mg tablet Take 3 tablets by mouth before dinner.      fludrocortisone (FLORINEF) 0.1 mg Tab Take 1 tablet (100 mcg total) by mouth once daily. 180 tablet 0    gabapentin (NEURONTIN) 600 MG tablet Take 1 tablet (600 mg total) by mouth 3 (three) times daily. 270 tablet 3    glimepiride (AMARYL) 4 MG tablet Take 1 tablet (4 mg total) by mouth 2 (two) times daily before meals. 90 tablet 1    hydrALAZINE (APRESOLINE) 10 MG tablet Take 1 tablet (10 mg total) by mouth 3 (three) times daily as needed (Systolic blood pressure > 180). 90 tablet 1    magnesium oxide 400 mg magnesium Tab 1 po qdaily 30 tablet 2    metoprolol succinate (TOPROL-XL) 25 MG 24 hr tablet Take  1 tablet (25 mg total) by mouth once daily. 90 tablet 3    midodrine (PROAMATINE) 10 MG tablet Take 1 tablet (10 mg total) by mouth daily as needed (Hypotension). 30 tablet 0    pantoprazole (PROTONIX) 40 MG tablet Take 1 tablet (40 mg total) by mouth once daily. 90 tablet 0     No current facility-administered medications for this visit.     Facility-Administered Medications Ordered in Other Visits   Medication Dose Route Frequency Provider Last Rate Last Admin    sodium chloride 0.9% in 1,000 mL infusion   Intravenous Continuous Paper Order            History  Past Medical History:   Diagnosis Date    Basal cell carcinoma     BPH (benign prostatic hyperplasia)     s/p TURP    Carotid stenosis     Chronic kidney disease     Claudication     Coronary artery disease     DDD (degenerative disc disease) 10/21/2013    Diabetes mellitus with renal complications     Disc disease, degenerative, cervical     Encounter for blood transfusion     GERD (gastroesophageal reflux disease)     Gout, chronic     History of ulcerative colitis     s/p colectomy and ileostomy    HLD (hyperlipidemia)     HTN (hypertension)     Ileostomy in place 1982    RBBB     Squamous cell carcinoma 03/08/2018    Left superior helix near insertion    Squamous cell carcinoma 04/12/2018    Left forearm x 5    Ventricular tachycardia      Past Surgical History:   Procedure Laterality Date    cardiac stents      CATARACT EXTRACTION Bilateral     CERVICAL FUSION      CHOLECYSTECTOMY N/A 2/14/2023    Procedure: CHOLECYSTECTOMY;  Surgeon: Mike Joyce MD;  Location: [facility] OR;  Service: General;  Laterality: N/A;  very high probabilty of converison to open    colectomy and ileostomy  1985    ENDOSCOPIC ULTRASOUND OF UPPER GASTROINTESTINAL TRACT N/A 2/10/2023    Procedure: ULTRASOUND, UPPER GI TRACT, ENDOSCOPIC;  Surgeon: Poli Fuller MD;  Location: [facility] ENDO;  Service: Endoscopy;  Laterality: N/A;    ERCP N/A 2/10/2023    Procedure: ERCP (ENDOSCOPIC  RETROGRADE CHOLANGIOPANCREATOGRAPHY);  Surgeon: Poli Fuller MD;  Location: [facility] ENDO;  Service: Endoscopy;  Laterality: N/A;    EXCISION OF PAROTID GLAND Left 12/18/2020    Procedure: EXCISION, PAROTID GLAND;  Surgeon: Michael Pinzon MD;  Location: [facility] OR 2ND FLR;  Service: ENT;  Laterality: Left;    INSERTION OF IMPLANTABLE LOOP RECORDER  06/07/2021    INSERTION OF IMPLANTABLE LOOP RECORDER Left 6/7/2021    Procedure: INSERTION, IMPLANTABLE LOOP RECORDER;  Surgeon: Romario Dao MD;  Location: [facility] [place] CATH LAB;  Service: Cardiology;  Laterality: Left;    LEFT HEART CATHETERIZATION Right 4/15/2021    Procedure: CATHETERIZATION, HEART, LEFT;  Surgeon: Marcio Jones MD;  Location: [facility] CATH LAB;  Service: Cardiology;  Laterality: Right;    LYSIS OF ADHESIONS N/A 11/9/2020    Procedure: LYSIS, ADHESIONS,  ERAS low;  Surgeon: CED Haley MD;  Location: [facility] OR 2ND FLR;  Service: Colon and Rectal;  Laterality: N/A;    LYSIS OF ADHESIONS N/A 2/14/2023    Procedure: LYSIS, ADHESIONS;  Surgeon: Mike Joyce MD;  Location: [facility] OR;  Service: General;  Laterality: N/A;    POUCHOSCOPY N/A 4/6/2022    Procedure: ENDOSCOPY, POUCH, SMALL INTESTINE, DIAGNOSTIC;  Surgeon: Dieter Juarez MD;  Location: [facility] (2ND FLR);  Service: Endoscopy;  Laterality: N/A;    REPAIR, HERNIA, PARASTOMAL N/A 11/9/2020    Procedure: REPAIR, HERNIA, PARASTOMAL;  Surgeon: CED Haley MD;  Location: [facility] OR 2ND FLR;  Service: Colon and Rectal;  Laterality: N/A;    TRANSURETHRAL RESECTION OF PROSTATE (TURP) WITHOUT USE OF LASER N/A 1/23/2019    Procedure: TURP, WITHOUT USING LASER BIPOLAR;  Surgeon: Catarino Mota MD;  Location: [facility] 1ST FLR;  Service: Urology;  Laterality: N/A;  1.5 HOURS     Social History     Socioeconomic History    Marital status:     Number of children: 1   Occupational History    Occupation:  x 44 years     Comment: Retired    Occupation: [place]     Tobacco Use    Smoking status: Never    Smokeless tobacco: Never   Substance and Sexual Activity    Alcohol use: No    Drug use: No    Sexual activity: Not Currently     Partners: Female     Social Determinants of Health     Financial Resource Strain: Low Risk     Difficulty of Paying Living Expenses: Not hard at all   Food Insecurity: No Food Insecurity    Worried About Running Out of Food in the Last Year: Never true    Ran Out of Food in the Last Year: Never true   Transportation Needs: No Transportation Needs    Lack of Transportation (Medical): No    Lack of Transportation (Non-Medical): No   Physical Activity: Inactive    Days of Exercise per Week: 0 days    Minutes of Exercise per Session: 0 min   Stress: Stress Concern Present    Feeling of Stress : To some extent   Social Connections: Moderately Isolated    Frequency of Communication with Friends and Family: More than three times a week    Frequency of Social Gatherings with Friends and Family: More than three times a week    Attends Druze Services: Never    Active Member of Clubs or Organizations: Yes    Attends Club or Organization Meetings: Never    Marital Status:    Housing Stability: Low Risk     Unable to Pay for Housing in the Last Year: No    Number of Places Lived in the Last Year: 1    Unstable Housing in the Last Year: No     Family History   Problem Relation Age of Onset    Cancer Father     Diabetes Father     Dementia Mother     Melanoma Neg Hx     Hypertension Neg Hx     Arthritis Neg Hx         Allergies  Review of patient's allergies indicates:   Allergen Reactions    Crestor [rosuvastatin]     Lipitor [atorvastatin]        Review of Systems   Review of Systems   Constitutional: Negative for fever.   HENT:  Negative for nosebleeds.    Eyes:  Negative for visual disturbance.   Cardiovascular:  Negative for chest pain, claudication, dyspnea on exertion, palpitations and syncope.   Respiratory:  Negative for cough, hemoptysis and  wheezing.    Endocrine: Negative for cold intolerance, heat intolerance, polyphagia and polyuria.   Hematologic/Lymphatic: Negative for bleeding problem.   Skin:  Negative for rash.   Musculoskeletal:  Negative for myalgias.   Gastrointestinal:  Negative for hematemesis, hematochezia, nausea and vomiting.   Genitourinary:  Negative for dysuria.   Neurological:  Negative for focal weakness and sensory change.   Psychiatric/Behavioral:  Negative for altered mental status.      Physical Exam  Vitals:    04/24/23 1059   BP: (!) 162/100   Pulse: 77     Wt Readings from Last 1 Encounters:   04/24/23 95.4 kg (210 lb 5.1 oz)     Physical Exam  HENT:      Head: Normocephalic and atraumatic.      Mouth/Throat:      Mouth: Mucous membranes are moist.   Eyes:      Extraocular Movements: Extraocular movements intact.      Pupils: Pupils are equal, round, and reactive to light.   Neck:      Vascular: Carotid bruit present. No JVD.   Cardiovascular:      Rate and Rhythm: Normal rate and regular rhythm.      Pulses: Normal pulses and intact distal pulses.      Heart sounds: Normal heart sounds. No murmur heard.    No friction rub. No gallop.   Pulmonary:      Effort: Pulmonary effort is normal.      Breath sounds: Normal breath sounds.   Abdominal:      Tenderness: There is no abdominal tenderness. There is no guarding or rebound.   Musculoskeletal:      Right lower leg: No edema.      Left lower leg: No edema.   Skin:     General: Skin is warm and dry.      Capillary Refill: Capillary refill takes less than 2 seconds.   Neurological:      General: No focal deficit present.      Mental Status: He is alert and oriented to person, place, and time.   Psychiatric:         Mood and Affect: Mood normal.       Labs  Admission on 04/22/2023, Discharged on 04/22/2023   Component Date Value Ref Range Status    Sodium 04/22/2023 142  136 - 145 mmol/L Final    Potassium 04/22/2023 4.3  3.5 - 5.1 mmol/L Final    Chloride 04/22/2023 110  95 -  110 mmol/L Final    CO2 04/22/2023 21 (L)  23 - 29 mmol/L Final    Glucose 04/22/2023 142 (H)  70 - 110 mg/dL Final    BUN 04/22/2023 43 (H)  8 - 23 mg/dL Final    Creatinine 04/22/2023 2.8 (H)  0.5 - 1.4 mg/dL Final    Calcium 04/22/2023 9.1  8.7 - 10.5 mg/dL Final    Total Protein 04/22/2023 7.3  6.0 - 8.4 g/dL Final    Albumin 04/22/2023 3.5  3.5 - 5.2 g/dL Final    Total Bilirubin 04/22/2023 0.5  0.1 - 1.0 mg/dL Final    Comment: For infants and newborns, interpretation of results should be based  on gestational age, weight and in agreement with clinical  observations.    Premature Infant recommended reference ranges:  Up to 24 hours.............<8.0 mg/dL  Up to 48 hours............<12.0 mg/dL  3-5 days..................<15.0 mg/dL  6-29 days.................<15.0 mg/dL      Alkaline Phosphatase 04/22/2023 188 (H)  55 - 135 U/L Final    AST 04/22/2023 44 (H)  10 - 40 U/L Final    ALT 04/22/2023 120 (H)  10 - 44 U/L Final    Anion Gap 04/22/2023 11  8 - 16 mmol/L Final    eGFR 04/22/2023 23.0 (A)  >60 mL/min/1.73 m^2 Final    WBC 04/22/2023 6.05  3.90 - 12.70 K/uL Final    RBC 04/22/2023 4.03 (L)  4.60 - 6.20 M/uL Final    Hemoglobin 04/22/2023 11.7 (L)  14.0 - 18.0 g/dL Final    Hematocrit 04/22/2023 37.2 (L)  40.0 - 54.0 % Final    MCV 04/22/2023 92  82 - 98 fL Final    MCH 04/22/2023 29.0  27.0 - 31.0 pg Final    MCHC 04/22/2023 31.5 (L)  32.0 - 36.0 g/dL Final    RDW 04/22/2023 14.5  11.5 - 14.5 % Final    Platelets 04/22/2023 189  150 - 450 K/uL Final    MPV 04/22/2023 11.1  9.2 - 12.9 fL Final    Immature Granulocytes 04/22/2023 0.2  0.0 - 0.5 % Final    Gran # (ANC) 04/22/2023 3.8  1.8 - 7.7 K/uL Final    Immature Grans (Abs) 04/22/2023 0.01  0.00 - 0.04 K/uL Final    Comment: Mild elevation in immature granulocytes is non specific and   can be seen in a variety of conditions including stress response,   acute inflammation, trauma and pregnancy. Correlation with other   laboratory and clinical findings is  essential.      Lymph # 04/22/2023 1.7  1.0 - 4.8 K/uL Final    Mono # 04/22/2023 0.4  0.3 - 1.0 K/uL Final    Eos # 04/22/2023 0.2  0.0 - 0.5 K/uL Final    Baso # 04/22/2023 0.04  0.00 - 0.20 K/uL Final    nRBC 04/22/2023 0  0 /100 WBC Final    Gran % 04/22/2023 62.0  38.0 - 73.0 % Final    Lymph % 04/22/2023 27.3  18.0 - 48.0 % Final    Mono % 04/22/2023 6.8  4.0 - 15.0 % Final    Eosinophil % 04/22/2023 3.0  0.0 - 8.0 % Final    Basophil % 04/22/2023 0.7  0.0 - 1.9 % Final    Differential Method 04/22/2023 Automated   Final    Total Protein 04/22/2023 7.3  6.0 - 8.4 g/dL Final    Albumin 04/22/2023 3.5  3.5 - 5.2 g/dL Final    Total Bilirubin 04/22/2023 0.5  0.1 - 1.0 mg/dL Final    Comment: For infants and newborns, interpretation of results should be based  on gestational age, weight and in agreement with clinical  observations.    Premature Infant recommended reference ranges:  Up to 24 hours.............<8.0 mg/dL  Up to 48 hours............<12.0 mg/dL  3-5 days..................<15.0 mg/dL  6-29 days.................<15.0 mg/dL      Bilirubin, Direct 04/22/2023 0.3  0.1 - 0.3 mg/dL Final    AST 04/22/2023 44 (H)  10 - 40 U/L Final    ALT 04/22/2023 120 (H)  10 - 44 U/L Final    Alkaline Phosphatase 04/22/2023 188 (H)  55 - 135 U/L Final    Lipase 04/22/2023 93 (H)  4 - 60 U/L Final   Lab Visit on 04/18/2023   Component Date Value Ref Range Status    Sodium 04/18/2023 141  136 - 145 mmol/L Final    Potassium 04/18/2023 3.9  3.5 - 5.1 mmol/L Final    Chloride 04/18/2023 107  95 - 110 mmol/L Final    CO2 04/18/2023 22 (L)  23 - 29 mmol/L Final    Glucose 04/18/2023 164 (H)  70 - 110 mg/dL Final    BUN 04/18/2023 44 (H)  8 - 23 mg/dL Final    Creatinine 04/18/2023 2.6 (H)  0.5 - 1.4 mg/dL Final    Calcium 04/18/2023 9.0  8.7 - 10.5 mg/dL Final    Total Protein 04/18/2023 7.2  6.0 - 8.4 g/dL Final    Albumin 04/18/2023 3.5  3.5 - 5.2 g/dL Final    Total Bilirubin 04/18/2023 1.5 (H)  0.1 - 1.0 mg/dL Final     Comment: For infants and newborns, interpretation of results should be based  on gestational age, weight and in agreement with clinical  observations.    Premature Infant recommended reference ranges:  Up to 24 hours.............<8.0 mg/dL  Up to 48 hours............<12.0 mg/dL  3-5 days..................<15.0 mg/dL  6-29 days.................<15.0 mg/dL      Alkaline Phosphatase 04/18/2023 202 (H)  55 - 135 U/L Final    AST 04/18/2023 416 (H)  10 - 40 U/L Final    ALT 04/18/2023 437 (H)  10 - 44 U/L Final    Anion Gap 04/18/2023 12  8 - 16 mmol/L Final    eGFR 04/18/2023 25.1 (A)  >60 mL/min/1.73 m^2 Final   Lab Visit on 04/10/2023   Component Date Value Ref Range Status    Sodium 04/10/2023 139  136 - 145 mmol/L Final    Potassium 04/10/2023 4.6  3.5 - 5.1 mmol/L Final    Chloride 04/10/2023 107  95 - 110 mmol/L Final    CO2 04/10/2023 20 (L)  23 - 29 mmol/L Final    Glucose 04/10/2023 152 (H)  70 - 110 mg/dL Final    BUN 04/10/2023 42 (H)  8 - 23 mg/dL Final    Creatinine 04/10/2023 2.3 (H)  0.5 - 1.4 mg/dL Final    Calcium 04/10/2023 9.0  8.7 - 10.5 mg/dL Final    Anion Gap 04/10/2023 12  8 - 16 mmol/L Final    eGFR 04/10/2023 29.1 (A)  >60 mL/min/1.73 m^2 Final   Lab Visit on 03/14/2023   Component Date Value Ref Range Status    WBC 03/14/2023 5.76  3.90 - 12.70 K/uL Final    RBC 03/14/2023 3.76 (L)  4.60 - 6.20 M/uL Final    Hemoglobin 03/14/2023 11.1 (L)  14.0 - 18.0 g/dL Final    Hematocrit 03/14/2023 35.4 (L)  40.0 - 54.0 % Final    MCV 03/14/2023 94  82 - 98 fL Final    MCH 03/14/2023 29.5  27.0 - 31.0 pg Final    MCHC 03/14/2023 31.4 (L)  32.0 - 36.0 g/dL Final    RDW 03/14/2023 14.8 (H)  11.5 - 14.5 % Final    Platelets 03/14/2023 166  150 - 450 K/uL Final    MPV 03/14/2023 11.2  9.2 - 12.9 fL Final    Immature Granulocytes 03/14/2023 0.3  0.0 - 0.5 % Final    Gran # (ANC) 03/14/2023 3.9  1.8 - 7.7 K/uL Final    Immature Grans (Abs) 03/14/2023 0.02  0.00 - 0.04 K/uL Final    Comment: Mild elevation in  immature granulocytes is non specific and   can be seen in a variety of conditions including stress response,   acute inflammation, trauma and pregnancy. Correlation with other   laboratory and clinical findings is essential.      Lymph # 03/14/2023 1.3  1.0 - 4.8 K/uL Final    Mono # 03/14/2023 0.4  0.3 - 1.0 K/uL Final    Eos # 03/14/2023 0.2  0.0 - 0.5 K/uL Final    Baso # 03/14/2023 0.03  0.00 - 0.20 K/uL Final    nRBC 03/14/2023 0  0 /100 WBC Final    Gran % 03/14/2023 68.1  38.0 - 73.0 % Final    Lymph % 03/14/2023 22.2  18.0 - 48.0 % Final    Mono % 03/14/2023 6.3  4.0 - 15.0 % Final    Eosinophil % 03/14/2023 2.6  0.0 - 8.0 % Final    Basophil % 03/14/2023 0.5  0.0 - 1.9 % Final    Differential Method 03/14/2023 Automated   Final    Glucose 03/14/2023 155 (H)  70 - 110 mg/dL Final    Sodium 03/14/2023 142  136 - 145 mmol/L Final    Potassium 03/14/2023 4.3  3.5 - 5.1 mmol/L Final    Chloride 03/14/2023 107  95 - 110 mmol/L Final    CO2 03/14/2023 23  23 - 29 mmol/L Final    BUN 03/14/2023 36 (H)  8 - 23 mg/dL Final    Calcium 03/14/2023 8.8  8.7 - 10.5 mg/dL Final    Creatinine 03/14/2023 2.6 (H)  0.5 - 1.4 mg/dL Final    Albumin 03/14/2023 3.3 (L)  3.5 - 5.2 g/dL Final    Phosphorus 03/14/2023 3.5  2.7 - 4.5 mg/dL Final    eGFR 03/14/2023 25.2 (A)  >60 mL/min/1.73 m^2 Final    Anion Gap 03/14/2023 12  8 - 16 mmol/L Final    Specimen UA 03/14/2023 Urine, Clean Catch   Final    Color, UA 03/14/2023 Yellow  Yellow, Straw, Stacy Final    Appearance, UA 03/14/2023 Clear  Clear Final    pH, UA 03/14/2023 6.0  5.0 - 8.0 Final    Specific Gravity, UA 03/14/2023 1.020  1.005 - 1.030 Final    Protein, UA 03/14/2023 Trace (A)  Negative Final    Comment: Recommend a 24 hour urine protein or a urine   protein/creatinine ratio if globulin induced proteinuria is  clinically suspected.      Glucose, UA 03/14/2023 Trace (A)  Negative Final    Ketones, UA 03/14/2023 Negative  Negative Final    Bilirubin (UA) 03/14/2023  Negative  Negative Final    Occult Blood UA 03/14/2023 Negative  Negative Final    Nitrite, UA 03/14/2023 Negative  Negative Final    Urobilinogen, UA 03/14/2023 Negative  Negative EU/dL Final    Leukocytes, UA 03/14/2023 Negative  Negative Final    Protein, Urine Random 03/14/2023 22 (H)  0 - 15 mg/dL Final    Creatinine, Urine 03/14/2023 214.0  23.0 - 375.0 mg/dL Final    Prot/Creat Ratio, Urine 03/14/2023 0.10  0.00 - 0.20 Final    Sodium 03/14/2023 142  136 - 145 mmol/L Final    Potassium 03/14/2023 4.3  3.5 - 5.1 mmol/L Final    Chloride 03/14/2023 107  95 - 110 mmol/L Final    CO2 03/14/2023 23  23 - 29 mmol/L Final    Glucose 03/14/2023 155 (H)  70 - 110 mg/dL Final    BUN 03/14/2023 36 (H)  8 - 23 mg/dL Final    Creatinine 03/14/2023 2.6 (H)  0.5 - 1.4 mg/dL Final    Calcium 03/14/2023 8.8  8.7 - 10.5 mg/dL Final    Anion Gap 03/14/2023 12  8 - 16 mmol/L Final    eGFR 03/14/2023 25.2 (A)  >60 mL/min/1.73 m^2 Final    Cholesterol 03/14/2023 148  120 - 199 mg/dL Final    Comment: The National Cholesterol Education Program (NCEP) has set the  following guidelines (reference ranges) for Cholesterol:  Optimal.....................<200 mg/dL  Borderline High.............200-239 mg/dL  High........................> or = 240 mg/dL      Triglycerides 03/14/2023 220 (H)  30 - 150 mg/dL Final    Comment: The National Cholesterol Education Program (NCEP) has set the  following guidelines (reference values) for triglycerides:  Normal......................<150 mg/dL  Borderline High.............150-199 mg/dL  High........................200-499 mg/dL      HDL 03/14/2023 34 (L)  40 - 75 mg/dL Final    Comment: The National Cholesterol Education Program (NCEP) has set the  following guidelines (reference values) for HDL Cholesterol:  Low...............<40 mg/dL  Optimal...........>60 mg/dL      LDL Cholesterol 03/14/2023 70.0  63.0 - 159.0 mg/dL Final    Comment: The National Cholesterol Education Program (NCEP) has set  the  following guidelines (reference values) for LDL Cholesterol:  Optimal.......................<130 mg/dL  Borderline High...............130-159 mg/dL  High..........................160-189 mg/dL  Very High.....................>190 mg/dL      HDL/Cholesterol Ratio 03/14/2023 23.0  20.0 - 50.0 % Final    Total Cholesterol/HDL Ratio 03/14/2023 4.4  2.0 - 5.0 Final    Non-HDL Cholesterol 03/14/2023 114  mg/dL Final    Comment: Risk category and Non-HDL cholesterol goals:  Coronary heart disease (CHD)or equivalent (10-year risk of CHD >20%):  Non-HDL cholesterol goal     <130 mg/dL  Two or more CHD risk factors and 10-year risk of CHD <= 20%:  Non-HDL cholesterol goal     <160 mg/dL  0 to 1 CHD risk factor:  Non-HDL cholesterol goal     <190 mg/dL      Magnesium 03/14/2023 1.3 (L)  1.6 - 2.6 mg/dL Final   Lab Visit on 03/06/2023   Component Date Value Ref Range Status    Urine Volume 03/06/2023 2900  mL Final    Urine Collection Duration 03/06/2023 24  Hr Final    Sodium, Urine 03/06/2023 50  20 - 250 mmol/L Final    Sodium, Timed Urine 03/06/2023 6.0  2.0 - 9.0 mmol/Hr Final    Sodium, Urine (mmol/spec) 03/06/2023 145  mmol/Spec Final    Urine Volume 03/06/2023 2900  mL Final    Urine Collection Duration 03/06/2023 24  Hr Final    Urea Nitrogen, Urine 03/06/2023 355  140 - 1050 mg/dL Final    Urea Nitrogen, Timed Urine 03/06/2023 429 (L)  500 - 850 mg/Hr Final    Urea Nit Urine (mg/spec) 03/06/2023 68411.0  mg/Spec Final    Urine Volume 03/06/2023 2900  mL Final    Urine Collection Duration 03/06/2023 24  Hr Final    Creatinine, Urine 03/06/2023 50.0  23.0 - 375.0 mg/dL Final    Creatinine, Timed Urine 03/06/2023 60.4  40.0 - 75.0 mg/Hr Final    Creatinine, Urinr (mg/spec) 03/06/2023 1450.0  mg/Spec Final   Admission on 02/24/2023, Discharged on 02/24/2023   Component Date Value Ref Range Status    WBC 02/24/2023 5.44  3.90 - 12.70 K/uL Final    RBC 02/24/2023 3.52 (L)  4.60 - 6.20 M/uL Final    Hemoglobin 02/24/2023  10.5 (L)  14.0 - 18.0 g/dL Final    Hematocrit 02/24/2023 33.4 (L)  40.0 - 54.0 % Final    MCV 02/24/2023 95  82 - 98 fL Final    MCH 02/24/2023 29.8  27.0 - 31.0 pg Final    MCHC 02/24/2023 31.4 (L)  32.0 - 36.0 g/dL Final    RDW 02/24/2023 15.2 (H)  11.5 - 14.5 % Final    Platelets 02/24/2023 320  150 - 450 K/uL Final    MPV 02/24/2023 10.0  9.2 - 12.9 fL Final    Immature Granulocytes 02/24/2023 0.2  0.0 - 0.5 % Final    Gran # (ANC) 02/24/2023 3.9  1.8 - 7.7 K/uL Final    Immature Grans (Abs) 02/24/2023 0.01  0.00 - 0.04 K/uL Final    Comment: Mild elevation in immature granulocytes is non specific and   can be seen in a variety of conditions including stress response,   acute inflammation, trauma and pregnancy. Correlation with other   laboratory and clinical findings is essential.      Lymph # 02/24/2023 1.0  1.0 - 4.8 K/uL Final    Mono # 02/24/2023 0.3  0.3 - 1.0 K/uL Final    Eos # 02/24/2023 0.2  0.0 - 0.5 K/uL Final    Baso # 02/24/2023 0.04  0.00 - 0.20 K/uL Final    nRBC 02/24/2023 0  0 /100 WBC Final    Gran % 02/24/2023 70.9  38.0 - 73.0 % Final    Lymph % 02/24/2023 18.9  18.0 - 48.0 % Final    Mono % 02/24/2023 5.3  4.0 - 15.0 % Final    Eosinophil % 02/24/2023 4.0  0.0 - 8.0 % Final    Basophil % 02/24/2023 0.7  0.0 - 1.9 % Final    Differential Method 02/24/2023 Automated   Final    Sodium 02/24/2023 139  136 - 145 mmol/L Final    Potassium 02/24/2023 4.4  3.5 - 5.1 mmol/L Final    Chloride 02/24/2023 110  95 - 110 mmol/L Final    CO2 02/24/2023 17 (L)  23 - 29 mmol/L Final    Glucose 02/24/2023 159 (H)  70 - 110 mg/dL Final    BUN 02/24/2023 31 (H)  8 - 23 mg/dL Final    Creatinine 02/24/2023 2.4 (H)  0.5 - 1.4 mg/dL Final    Calcium 02/24/2023 8.9  8.7 - 10.5 mg/dL Final    Total Protein 02/24/2023 7.1  6.0 - 8.4 g/dL Final    Albumin 02/24/2023 3.3 (L)  3.5 - 5.2 g/dL Final    Total Bilirubin 02/24/2023 0.8  0.1 - 1.0 mg/dL Final    Comment: For infants and newborns, interpretation of results  should be based  on gestational age, weight and in agreement with clinical  observations.    Premature Infant recommended reference ranges:  Up to 24 hours.............<8.0 mg/dL  Up to 48 hours............<12.0 mg/dL  3-5 days..................<15.0 mg/dL  6-29 days.................<15.0 mg/dL      Alkaline Phosphatase 02/24/2023 208 (H)  55 - 135 U/L Final    AST 02/24/2023 58 (H)  10 - 40 U/L Final    ALT 02/24/2023 45 (H)  10 - 44 U/L Final    Anion Gap 02/24/2023 12  8 - 16 mmol/L Final    eGFR 02/24/2023 27.8 (A)  >60 mL/min/1.73 m^2 Final    Lipase 02/24/2023 327 (H)  4 - 60 U/L Final    Specimen UA 02/24/2023 Urine, Clean Catch   Final    Color, UA 02/24/2023 Colorless (A)  Yellow, Straw, Stacy Final    Appearance, UA 02/24/2023 Clear  Clear Final    pH, UA 02/24/2023 5.0  5.0 - 8.0 Final    Specific Gravity, UA 02/24/2023 1.010  1.005 - 1.030 Final    Protein, UA 02/24/2023 Negative  Negative Final    Comment: Recommend a 24 hour urine protein or a urine   protein/creatinine ratio if globulin induced proteinuria is  clinically suspected.      Glucose, UA 02/24/2023 Negative  Negative Final    Ketones, UA 02/24/2023 Negative  Negative Final    Bilirubin (UA) 02/24/2023 Negative  Negative Final    Occult Blood UA 02/24/2023 Negative  Negative Final    Nitrite, UA 02/24/2023 Negative  Negative Final    Urobilinogen, UA 02/24/2023 Negative  Negative EU/dL Final    Leukocytes, UA 02/24/2023 Negative  Negative Final    Troponin I 02/24/2023 0.006  0.000 - 0.026 ng/mL Final    Comment: The reference interval for Troponin I represents the 99th percentile   cutoff   for our facility and is consistent with 3rd generation assay   performance.      Lactate (Lactic Acid) 02/24/2023 1.8  0.5 - 2.2 mmol/L Final    Comment: Falsely low lactic acid results can be found in samples   containing >=13.0 mg/dL total bilirubin and/or >=3.5 mg/dL   direct bilirubin.      Blood Culture, Routine 02/24/2023 No growth after 5 days.    Final    Blood Culture, Routine 02/24/2023 No growth after 5 days.   Final    Group & Rh 02/24/2023 A POS   Final    Indirect Servando 02/24/2023 NEG   Final   Lab Visit on 02/22/2023   Component Date Value Ref Range Status    Sodium 02/22/2023 144  136 - 145 mmol/L Final    Potassium 02/22/2023 4.6  3.5 - 5.1 mmol/L Final    Chloride 02/22/2023 111 (H)  95 - 110 mmol/L Final    CO2 02/22/2023 22 (L)  23 - 29 mmol/L Final    Glucose 02/22/2023 109  70 - 110 mg/dL Final    BUN 02/22/2023 32 (H)  8 - 23 mg/dL Final    Creatinine 02/22/2023 2.7 (H)  0.5 - 1.4 mg/dL Final    Calcium 02/22/2023 9.1  8.7 - 10.5 mg/dL Final    Total Protein 02/22/2023 7.0  6.0 - 8.4 g/dL Final    Albumin 02/22/2023 3.3 (L)  3.5 - 5.2 g/dL Final    Total Bilirubin 02/22/2023 0.9  0.1 - 1.0 mg/dL Final    Comment: For infants and newborns, interpretation of results should be based  on gestational age, weight and in agreement with clinical  observations.    Premature Infant recommended reference ranges:  Up to 24 hours.............<8.0 mg/dL  Up to 48 hours............<12.0 mg/dL  3-5 days..................<15.0 mg/dL  6-29 days.................<15.0 mg/dL      Alkaline Phosphatase 02/22/2023 208 (H)  55 - 135 U/L Final    AST 02/22/2023 46 (H)  10 - 40 U/L Final    ALT 02/22/2023 44  10 - 44 U/L Final    Anion Gap 02/22/2023 11  8 - 16 mmol/L Final    eGFR 02/22/2023 24.1 (A)  >60 mL/min/1.73 m^2 Final    WBC 02/22/2023 6.84  3.90 - 12.70 K/uL Final    RBC 02/22/2023 3.58 (L)  4.60 - 6.20 M/uL Final    Hemoglobin 02/22/2023 10.5 (L)  14.0 - 18.0 g/dL Final    Hematocrit 02/22/2023 34.3 (L)  40.0 - 54.0 % Final    MCV 02/22/2023 96  82 - 98 fL Final    MCH 02/22/2023 29.3  27.0 - 31.0 pg Final    MCHC 02/22/2023 30.6 (L)  32.0 - 36.0 g/dL Final    RDW 02/22/2023 15.3 (H)  11.5 - 14.5 % Final    Platelets 02/22/2023 322  150 - 450 K/uL Final    MPV 02/22/2023 10.3  9.2 - 12.9 fL Final    Immature Granulocytes 02/22/2023 0.6 (H)  0.0 - 0.5 %  Final    Gran # (ANC) 02/22/2023 5.1  1.8 - 7.7 K/uL Final    Immature Grans (Abs) 02/22/2023 0.04  0.00 - 0.04 K/uL Final    Comment: Mild elevation in immature granulocytes is non specific and   can be seen in a variety of conditions including stress response,   acute inflammation, trauma and pregnancy. Correlation with other   laboratory and clinical findings is essential.      Lymph # 02/22/2023 1.2  1.0 - 4.8 K/uL Final    Mono # 02/22/2023 0.3  0.3 - 1.0 K/uL Final    Eos # 02/22/2023 0.2  0.0 - 0.5 K/uL Final    Baso # 02/22/2023 0.03  0.00 - 0.20 K/uL Final    nRBC 02/22/2023 0  0 /100 WBC Final    Gran % 02/22/2023 74.4 (H)  38.0 - 73.0 % Final    Lymph % 02/22/2023 17.8 (L)  18.0 - 48.0 % Final    Mono % 02/22/2023 4.2  4.0 - 15.0 % Final    Eosinophil % 02/22/2023 2.6  0.0 - 8.0 % Final    Basophil % 02/22/2023 0.4  0.0 - 1.9 % Final    Differential Method 02/22/2023 Automated   Final    TSH 02/22/2023 2.178  0.400 - 4.000 uIU/mL Final    Hemoglobin A1C 02/22/2023 6.6 (H)  4.0 - 5.6 % Final    Comment: ADA Screening Guidelines:  5.7-6.4%  Consistent with prediabetes  >or=6.5%  Consistent with diabetes    High levels of fetal hemoglobin interfere with the HbA1C  assay. Heterozygous hemoglobin variants (HbS, HgC, etc)do  not significantly interfere with this assay.   However, presence of multiple variants may affect accuracy.      Estimated Avg Glucose 02/22/2023 143 (H)  68 - 131 mg/dL Final   No results displayed because visit has over 200 results.      Admission on 02/08/2023, Discharged on 02/09/2023   Component Date Value Ref Range Status    WBC 02/08/2023 7.22  3.90 - 12.70 K/uL Final    RBC 02/08/2023 4.27 (L)  4.60 - 6.20 M/uL Final    Hemoglobin 02/08/2023 12.6 (L)  14.0 - 18.0 g/dL Final    Hematocrit 02/08/2023 38.4 (L)  40.0 - 54.0 % Final    MCV 02/08/2023 90  82 - 98 fL Final    MCH 02/08/2023 29.5  27.0 - 31.0 pg Final    MCHC 02/08/2023 32.8  32.0 - 36.0 g/dL Final    RDW 02/08/2023 14.5   11.5 - 14.5 % Final    Platelets 02/08/2023 150  150 - 450 K/uL Final    MPV 02/08/2023 11.6  9.2 - 12.9 fL Final    Immature Granulocytes 02/08/2023 0.1  0.0 - 0.5 % Final    Gran # (ANC) 02/08/2023 6.7  1.8 - 7.7 K/uL Final    Immature Grans (Abs) 02/08/2023 0.01  0.00 - 0.04 K/uL Final    Comment: Mild elevation in immature granulocytes is non specific and   can be seen in a variety of conditions including stress response,   acute inflammation, trauma and pregnancy. Correlation with other   laboratory and clinical findings is essential.      Lymph # 02/08/2023 0.2 (L)  1.0 - 4.8 K/uL Final    Mono # 02/08/2023 0.2 (L)  0.3 - 1.0 K/uL Final    Eos # 02/08/2023 0.0  0.0 - 0.5 K/uL Final    Baso # 02/08/2023 0.01  0.00 - 0.20 K/uL Final    nRBC 02/08/2023 0  0 /100 WBC Final    Gran % 02/08/2023 93.2 (H)  38.0 - 73.0 % Final    Lymph % 02/08/2023 3.2 (L)  18.0 - 48.0 % Final    Mono % 02/08/2023 3.3 (L)  4.0 - 15.0 % Final    Eosinophil % 02/08/2023 0.1  0.0 - 8.0 % Final    Basophil % 02/08/2023 0.1  0.0 - 1.9 % Final    Differential Method 02/08/2023 Automated   Final    Sodium 02/08/2023 136  136 - 145 mmol/L Final    Potassium 02/08/2023 4.2  3.5 - 5.1 mmol/L Final    Chloride 02/08/2023 104  95 - 110 mmol/L Final    CO2 02/08/2023 18 (L)  23 - 29 mmol/L Final    Glucose 02/08/2023 233 (H)  70 - 110 mg/dL Final    BUN 02/08/2023 40 (H)  8 - 23 mg/dL Final    Creatinine 02/08/2023 3.1 (H)  0.5 - 1.4 mg/dL Final    Calcium 02/08/2023 8.8  8.7 - 10.5 mg/dL Final    Total Protein 02/08/2023 7.1  6.0 - 8.4 g/dL Final    Albumin 02/08/2023 3.4 (L)  3.5 - 5.2 g/dL Final    Total Bilirubin 02/08/2023 2.0 (H)  0.1 - 1.0 mg/dL Final    Comment: For infants and newborns, interpretation of results should be based  on gestational age, weight and in agreement with clinical  observations.    Premature Infant recommended reference ranges:  Up to 24 hours.............<8.0 mg/dL  Up to 48 hours............<12.0 mg/dL  3-5  days..................<15.0 mg/dL  6-29 days.................<15.0 mg/dL      Alkaline Phosphatase 02/08/2023 336 (H)  55 - 135 U/L Final    AST 02/08/2023 990 (H)  10 - 40 U/L Final    ALT 02/08/2023 571 (H)  10 - 44 U/L Final    Anion Gap 02/08/2023 14  8 - 16 mmol/L Final    eGFR 02/08/2023 20.4 (A)  >60 mL/min/1.73 m^2 Final    Troponin I 02/08/2023 <0.006  0.000 - 0.026 ng/mL Final    Comment: The reference interval for Troponin I represents the 99th percentile   cutoff   for our facility and is consistent with 3rd generation assay   performance.      Lipase 02/08/2023 81 (H)  4 - 60 U/L Final    BNP 02/08/2023 90  0 - 99 pg/mL Final    Values of less than 100 pg/ml are consistent with non-CHF populations.    POCT Glucose 02/08/2023 178 (H)  70 - 110 mg/dL Final    Sodium 02/09/2023 140  136 - 145 mmol/L Final    Potassium 02/09/2023 3.7  3.5 - 5.1 mmol/L Final    Chloride 02/09/2023 108  95 - 110 mmol/L Final    CO2 02/09/2023 20 (L)  23 - 29 mmol/L Final    Glucose 02/09/2023 101  70 - 110 mg/dL Final    BUN 02/09/2023 33 (H)  8 - 23 mg/dL Final    Creatinine 02/09/2023 2.8 (H)  0.5 - 1.4 mg/dL Final    Calcium 02/09/2023 8.5 (L)  8.7 - 10.5 mg/dL Final    Total Protein 02/09/2023 6.5  6.0 - 8.4 g/dL Final    Albumin 02/09/2023 3.0 (L)  3.5 - 5.2 g/dL Final    Total Bilirubin 02/09/2023 4.8 (H)  0.1 - 1.0 mg/dL Final    Comment: For infants and newborns, interpretation of results should be based  on gestational age, weight and in agreement with clinical  observations.    Premature Infant recommended reference ranges:  Up to 24 hours.............<8.0 mg/dL  Up to 48 hours............<12.0 mg/dL  3-5 days..................<15.0 mg/dL  6-29 days.................<15.0 mg/dL      Alkaline Phosphatase 02/09/2023 288 (H)  55 - 135 U/L Final    AST 02/09/2023 395 (H)  10 - 40 U/L Final    ALT 02/09/2023 402 (H)  10 - 44 U/L Final    Anion Gap 02/09/2023 12  8 - 16 mmol/L Final    eGFR 02/09/2023 23.1 (A)  >60  mL/min/1.73 m^2 Final    Lipase 02/09/2023 27  4 - 60 U/L Final    WBC 02/09/2023 8.70  3.90 - 12.70 K/uL Final    RBC 02/09/2023 4.01 (L)  4.60 - 6.20 M/uL Final    Hemoglobin 02/09/2023 11.8 (L)  14.0 - 18.0 g/dL Final    Hematocrit 02/09/2023 37.0 (L)  40.0 - 54.0 % Final    MCV 02/09/2023 92  82 - 98 fL Final    MCH 02/09/2023 29.4  27.0 - 31.0 pg Final    MCHC 02/09/2023 31.9 (L)  32.0 - 36.0 g/dL Final    RDW 02/09/2023 14.9 (H)  11.5 - 14.5 % Final    Platelets 02/09/2023 127 (L)  150 - 450 K/uL Final    MPV 02/09/2023 11.4  9.2 - 12.9 fL Final    Immature Granulocytes 02/09/2023 0.3  0.0 - 0.5 % Final    Gran # (ANC) 02/09/2023 7.9 (H)  1.8 - 7.7 K/uL Final    Immature Grans (Abs) 02/09/2023 0.03  0.00 - 0.04 K/uL Final    Comment: Mild elevation in immature granulocytes is non specific and   can be seen in a variety of conditions including stress response,   acute inflammation, trauma and pregnancy. Correlation with other   laboratory and clinical findings is essential.      Lymph # 02/09/2023 0.4 (L)  1.0 - 4.8 K/uL Final    Mono # 02/09/2023 0.3  0.3 - 1.0 K/uL Final    Eos # 02/09/2023 0.0  0.0 - 0.5 K/uL Final    Baso # 02/09/2023 0.02  0.00 - 0.20 K/uL Final    nRBC 02/09/2023 0  0 /100 WBC Final    Gran % 02/09/2023 91.1 (H)  38.0 - 73.0 % Final    Lymph % 02/09/2023 4.3 (L)  18.0 - 48.0 % Final    Mono % 02/09/2023 3.9 (L)  4.0 - 15.0 % Final    Eosinophil % 02/09/2023 0.2  0.0 - 8.0 % Final    Basophil % 02/09/2023 0.2  0.0 - 1.9 % Final    Differential Method 02/09/2023 Automated   Final    POCT Glucose 02/09/2023 68 (L)  70 - 110 mg/dL Final   Lab Visit on 01/31/2023   Component Date Value Ref Range Status    Chloride, Urine 01/31/2023 73  25 - 200 mmol/L Final    Comment: The random urine reference ranges provided were established   for 24 hour urine collections.  No reference ranges exist for  random urine specimens.  Correlate clinically.      Osmolality, Urine 01/31/2023 373  50 - 1200 mOsm/kg  Final    Comment: The random urine reference ranges provided were established   for 24 hour urine collections.  No reference ranges exist for  random urine specimens.  Correlate clinically.      Specimen UA 01/31/2023 Urine, Clean Catch   Final    Color, UA 01/31/2023 Colorless (A)  Yellow, Straw, Stacy Final    Appearance, UA 01/31/2023 Clear  Clear Final    pH, UA 01/31/2023 6.0  5.0 - 8.0 Final    Specific Gravity, UA 01/31/2023 1.010  1.005 - 1.030 Final    Protein, UA 01/31/2023 Negative  Negative Final    Comment: Recommend a 24 hour urine protein or a urine   protein/creatinine ratio if globulin induced proteinuria is  clinically suspected.      Glucose, UA 01/31/2023 1+ (A)  Negative Final    Ketones, UA 01/31/2023 Negative  Negative Final    Bilirubin (UA) 01/31/2023 Negative  Negative Final    Occult Blood UA 01/31/2023 Negative  Negative Final    Nitrite, UA 01/31/2023 Negative  Negative Final    Urobilinogen, UA 01/31/2023 Negative  Negative EU/dL Final    Leukocytes, UA 01/31/2023 Negative  Negative Final    Potassium, Urine 01/31/2023 32  15 - 95 mmol/L Final    Comment: The random urine reference ranges provided were established   for 24 hour urine collections.  No reference ranges exist for  random urine specimens.  Correlate clinically.     There may be more visits with results that are not included.       EKG  12/2022 normal sinus rhythm normal rate borderline RI interval right bundle-branch block LAFB prolonged QT    Echo   Results for orders placed or performed during the hospital encounter of 12/15/22   Echo   Result Value Ref Range    BSA 2.26 m2    TDI SEPTAL 0.06 m/s    LV LATERAL E/E' RATIO 3.36 m/s    LV SEPTAL E/E' RATIO 6.17 m/s    Left Ventricular Outflow Tract Mean Velocity 0.74 cm/s    Left Ventricular Outflow Tract Mean Gradient 2.35 mmHg    AORTIC VALVE CUSP SEPERATION 1.69 cm    TDI LATERAL 0.11 m/s    PV PEAK VELOCITY 1.15 cm/s    LVIDd 5.13 3.5 - 6.0 cm    IVS 1.31 (A) 0.6 - 1.1  cm    Posterior Wall 1.32 (A) 0.6 - 1.1 cm    Ao root annulus 3.57 cm    LVIDs 4.01 (A) 2.1 - 4.0 cm    FS 22 28 - 44 %    LV mass 277.06 g    RVDD 3.24 cm    Left Ventricle Relative Wall Thickness 0.51 cm    AV Velocity Ratio 0.71     MV valve area p 1/2 method 2.76 cm2    E/A ratio 0.56     Mean e' 0.09 m/s    E wave deceleration time 274.49 msec    IVRT 72.31 msec    LVOT diameter 1.94 cm    LVOT area 3.0 cm2    LVOT peak joao 0.97 m/s    LVOT peak VTI 23.20 cm    Ao peak joao 1.36 m/s    LVOT stroke volume 68.54 cm3    AV peak gradient 7 mmHg    E/E' ratio 4.35 m/s    MV Peak E Joao 0.37 m/s    TR Max Joao 2.83 m/s    MV stenosis pressure 1/2 time 79.60 ms    MV Peak A Joao 0.66 m/s    LV Systolic Volume 70.32 mL    LV Systolic Volume Index 31.3 mL/m2    LV Diastolic Volume 125.27 mL    LV Diastolic Volume Index 55.68 mL/m2    LV Mass Index 123 g/m2    RA Major Axis 5.09 cm    Left Atrium Minor Axis 3.29 cm    Left Atrium Major Axis 6.12 cm    Triscuspid Valve Regurgitation Peak Gradient 32 mmHg    LA Volume Index (Mod) 22.2 mL/m2    LA volume (mod) 49.99 cm3    RA Width 2.10 cm    EF 60 %    Proximal aorta 3.60 cm    Narrative    · The left ventricle is normal in size with mild concentric hypertrophy   and normal systolic function.  · The estimated ejection fraction is 60%. Technically difficult study. EF   is based on limited views with poor endocardial border visualization.  · Indeterminate left ventricular diastolic function.  · Normal right ventricular size with normal right ventricular systolic   function.  · PASP is 32 plus central venous pressure which could not be determined.  · The ascending aorta is mildly dilated.          Imaging  No results found.    Prior coronary angiogram / intervention:  See HPI    Assessment and Plan  1. Syncope, orthostatic / autonomic dysfunction  See regimen in HPI  Follow up with Neurology  Needs to be on Toprol  Has midodrine p.r.n. and is on fludrocortisone  Scheduled IVF  infusions and p.o. appreciate nephrology assistance  Gastroenterology is attempting to prevent watery stool    2. Orthostatic hypotension /autonomic dysfunction  As above    3. Essential hypertension  As above  Patient has labile hypertension with frequent hypotension very complex as in HPI  Continue Toprol with hydralazine p.r.n.    BP Readings from Last 3 Encounters:   04/24/23 (!) 140/92   04/22/23 (!) 157/80   04/21/23 (!) 172/85     Pulse Readings from Last 3 Encounters:   04/24/23 79   04/22/23 (!) 56   04/21/23 62         4. Coronary artery disease involving native coronary artery of native heart without angina pectoris  Continue aspirin 81 holding Pravachol given LFTs recently needs to get back in with GI  Evidently there allergies to Crestor and Lipitor.  These allergies are unknown.    5. Prolonged QT  Avoid QT prolonging agents     6. Hyperlipidemia, unspecified hyperlipidemia type  Holding statin given LFTs    7. VT (ventricular tachycardia) +/- pAF?  The patient has had implanted LINQ device  Continue BB  Do not have enough evidence of PAF per electrophysiology  See HPI  Follow up with electrophysiology as he is now established    8. Ascending aortic aneurysm, unspecified whether ruptured  Control hypertension on beta-blocker  Serial monitoring difficult with advanced CKD    9. Carotid stenosis  Not hemodynamically significant    Follow Up  3 month  Make follow-up appointment with Gastroenterology      Aj Marrufo MD, FACC, RPVI  Interventional Cardiology

## 2023-04-25 ENCOUNTER — TELEPHONE (OUTPATIENT)
Dept: CARDIOLOGY | Facility: CLINIC | Age: 73
End: 2023-04-25
Payer: MEDICARE

## 2023-04-25 ENCOUNTER — TELEPHONE (OUTPATIENT)
Dept: DERMATOLOGY | Facility: CLINIC | Age: 73
End: 2023-04-25
Payer: MEDICARE

## 2023-04-25 RX ORDER — BRIMONIDINE TARTRATE 2 MG/ML
1 SOLUTION/ DROPS OPHTHALMIC 2 TIMES DAILY
Qty: 20 ML | Refills: 0 | Status: SHIPPED | OUTPATIENT
Start: 2023-04-25 | End: 2023-07-07

## 2023-04-25 NOTE — TELEPHONE ENCOUNTER
----- Message from Grisel Marroquin sent at 4/25/2023  9:26 AM CDT -----  Contact: pt  Pt requesting urgent call back RE: pt states after being taken off of Prilosec he has been having really bad acid reflux would like to discuss      Confirmed contact below:  Contact Name:Jarrett Charlton  Phone Number: [phone]

## 2023-04-25 NOTE — TELEPHONE ENCOUNTER
LOV 04/24/2023     Spoke with patient, he stopped pravastatin as recommended by provider.  He is experiencing reflux, heartburn, nausea.  I explained that pantoprazole was recently refilled by Dr. Gonzalez and is used for reflux/heartburn.  The pravastatin is a cholesterol medication.  Patient verbalized understanding.

## 2023-04-27 ENCOUNTER — INFUSION (OUTPATIENT)
Dept: INFECTIOUS DISEASES | Facility: HOSPITAL | Age: 73
End: 2023-04-27
Payer: MEDICARE

## 2023-04-27 VITALS
OXYGEN SATURATION: 98 % | BODY MASS INDEX: 27.3 KG/M2 | DIASTOLIC BLOOD PRESSURE: 86 MMHG | HEART RATE: 79 BPM | SYSTOLIC BLOOD PRESSURE: 168 MMHG | TEMPERATURE: 99 F | WEIGHT: 212.63 LBS

## 2023-04-27 DIAGNOSIS — Z93.3 COLOSTOMY PRESENT: ICD-10-CM

## 2023-04-27 DIAGNOSIS — E11.22 TYPE 2 DIABETES MELLITUS WITH STAGE 3B CHRONIC KIDNEY DISEASE, WITHOUT LONG-TERM CURRENT USE OF INSULIN: ICD-10-CM

## 2023-04-27 DIAGNOSIS — N18.32 TYPE 2 DIABETES MELLITUS WITH STAGE 3B CHRONIC KIDNEY DISEASE, WITHOUT LONG-TERM CURRENT USE OF INSULIN: ICD-10-CM

## 2023-04-27 DIAGNOSIS — E86.0 DEHYDRATION: Primary | ICD-10-CM

## 2023-04-27 PROCEDURE — 96360 HYDRATION IV INFUSION INIT: CPT | Mod: HCNC

## 2023-04-27 PROCEDURE — 25000003 PHARM REV CODE 250: Mod: HCNC | Performed by: NURSE PRACTITIONER

## 2023-04-27 RX ORDER — SODIUM CHLORIDE 0.9 % (FLUSH) 0.9 %
10 SYRINGE (ML) INJECTION
Status: CANCELLED | OUTPATIENT
Start: 2023-04-28

## 2023-04-27 RX ORDER — HEPARIN 100 UNIT/ML
500 SYRINGE INTRAVENOUS
Status: CANCELLED | OUTPATIENT
Start: 2023-04-28

## 2023-04-27 RX ADMIN — SODIUM CHLORIDE 1000 ML: 9 INJECTION, SOLUTION INTRAVENOUS at 08:04

## 2023-05-05 ENCOUNTER — INFUSION (OUTPATIENT)
Dept: INFECTIOUS DISEASES | Facility: HOSPITAL | Age: 73
End: 2023-05-05
Payer: MEDICARE

## 2023-05-05 VITALS
BODY MASS INDEX: 27.19 KG/M2 | SYSTOLIC BLOOD PRESSURE: 169 MMHG | WEIGHT: 211.75 LBS | RESPIRATION RATE: 18 BRPM | TEMPERATURE: 98 F | DIASTOLIC BLOOD PRESSURE: 82 MMHG | HEART RATE: 71 BPM | OXYGEN SATURATION: 97 %

## 2023-05-05 DIAGNOSIS — N18.32 TYPE 2 DIABETES MELLITUS WITH STAGE 3B CHRONIC KIDNEY DISEASE, WITHOUT LONG-TERM CURRENT USE OF INSULIN: ICD-10-CM

## 2023-05-05 DIAGNOSIS — Z93.3 COLOSTOMY PRESENT: ICD-10-CM

## 2023-05-05 DIAGNOSIS — E11.22 TYPE 2 DIABETES MELLITUS WITH STAGE 3B CHRONIC KIDNEY DISEASE, WITHOUT LONG-TERM CURRENT USE OF INSULIN: ICD-10-CM

## 2023-05-05 DIAGNOSIS — E86.0 DEHYDRATION: Primary | ICD-10-CM

## 2023-05-05 PROCEDURE — 96360 HYDRATION IV INFUSION INIT: CPT

## 2023-05-05 PROCEDURE — 25000003 PHARM REV CODE 250: Performed by: NURSE PRACTITIONER

## 2023-05-05 RX ORDER — HEPARIN 100 UNIT/ML
500 SYRINGE INTRAVENOUS
Status: CANCELLED | OUTPATIENT
Start: 2023-05-12

## 2023-05-05 RX ORDER — SODIUM CHLORIDE 0.9 % (FLUSH) 0.9 %
10 SYRINGE (ML) INJECTION
Status: CANCELLED | OUTPATIENT
Start: 2023-05-12

## 2023-05-05 RX ADMIN — SODIUM CHLORIDE 1000 ML: 9 INJECTION, SOLUTION INTRAVENOUS at 08:05

## 2023-05-09 ENCOUNTER — CLINICAL SUPPORT (OUTPATIENT)
Dept: CARDIOLOGY | Facility: HOSPITAL | Age: 73
End: 2023-05-09
Payer: MEDICARE

## 2023-05-09 ENCOUNTER — TELEPHONE (OUTPATIENT)
Dept: CARDIOLOGY | Facility: CLINIC | Age: 73
End: 2023-05-09
Payer: MEDICARE

## 2023-05-09 DIAGNOSIS — Z95.818 PRESENCE OF OTHER CARDIAC IMPLANTS AND GRAFTS: ICD-10-CM

## 2023-05-09 PROCEDURE — G2066 INTER DEVC REMOTE 30D: HCPCS | Performed by: INTERNAL MEDICINE

## 2023-05-09 NOTE — TELEPHONE ENCOUNTER
----- Message from Grisel Marroquin sent at 5/9/2023  9:14 AM CDT -----  Contact: pt  Pt requesting urgent call back RE: Pt states his blood pressure has been low and has not been going up would like to discuss plan of treatment soon as possible    Confirmed contact below:  Contact Name:Jarrett Charlton  Phone Number: [phone]

## 2023-05-09 NOTE — TELEPHONE ENCOUNTER
LOV 04/24/2023    Midodrine PRN hypotension  Hydralazine PRN Hypertension    Spoke with patient, since yesterday blood pressure is low.    0400 BP 95/64 took 20 mg midodrine  0900 BP 90/60    Dizzy and lightheaded, drinking plenty of fluids (water & gatorade).

## 2023-05-10 ENCOUNTER — OFFICE VISIT (OUTPATIENT)
Dept: PRIMARY CARE CLINIC | Facility: CLINIC | Age: 73
End: 2023-05-10
Payer: MEDICARE

## 2023-05-10 ENCOUNTER — TELEPHONE (OUTPATIENT)
Dept: NEPHROLOGY | Facility: CLINIC | Age: 73
End: 2023-05-10
Payer: MEDICARE

## 2023-05-10 ENCOUNTER — TELEPHONE (OUTPATIENT)
Dept: PRIMARY CARE CLINIC | Facility: CLINIC | Age: 73
End: 2023-05-10
Payer: MEDICARE

## 2023-05-10 VITALS
RESPIRATION RATE: 17 BRPM | HEART RATE: 57 BPM | TEMPERATURE: 98 F | OXYGEN SATURATION: 99 % | SYSTOLIC BLOOD PRESSURE: 122 MMHG | WEIGHT: 213.06 LBS | DIASTOLIC BLOOD PRESSURE: 72 MMHG | HEIGHT: 74 IN | BODY MASS INDEX: 27.34 KG/M2

## 2023-05-10 DIAGNOSIS — E78.5 HYPERLIPIDEMIA, UNSPECIFIED HYPERLIPIDEMIA TYPE: ICD-10-CM

## 2023-05-10 DIAGNOSIS — R19.8 HIGH OUTPUT ILEOSTOMY: Primary | ICD-10-CM

## 2023-05-10 DIAGNOSIS — Z93.2 HIGH OUTPUT ILEOSTOMY: Primary | ICD-10-CM

## 2023-05-10 DIAGNOSIS — I10 ESSENTIAL HYPERTENSION: ICD-10-CM

## 2023-05-10 DIAGNOSIS — N18.32 TYPE 2 DIABETES MELLITUS WITH STAGE 3B CHRONIC KIDNEY DISEASE, WITHOUT LONG-TERM CURRENT USE OF INSULIN: ICD-10-CM

## 2023-05-10 DIAGNOSIS — N18.32 STAGE 3B CHRONIC KIDNEY DISEASE: ICD-10-CM

## 2023-05-10 DIAGNOSIS — E11.22 TYPE 2 DIABETES MELLITUS WITH STAGE 3B CHRONIC KIDNEY DISEASE, WITHOUT LONG-TERM CURRENT USE OF INSULIN: ICD-10-CM

## 2023-05-10 PROCEDURE — 3288F FALL RISK ASSESSMENT DOCD: CPT | Mod: CPTII,S$GLB,, | Performed by: INTERNAL MEDICINE

## 2023-05-10 PROCEDURE — 1126F AMNT PAIN NOTED NONE PRSNT: CPT | Mod: CPTII,S$GLB,, | Performed by: INTERNAL MEDICINE

## 2023-05-10 PROCEDURE — 3074F PR MOST RECENT SYSTOLIC BLOOD PRESSURE < 130 MM HG: ICD-10-PCS | Mod: CPTII,S$GLB,, | Performed by: INTERNAL MEDICINE

## 2023-05-10 PROCEDURE — 3066F PR DOCUMENTATION OF TREATMENT FOR NEPHROPATHY: ICD-10-PCS | Mod: CPTII,S$GLB,, | Performed by: INTERNAL MEDICINE

## 2023-05-10 PROCEDURE — 3288F PR FALLS RISK ASSESSMENT DOCUMENTED: ICD-10-PCS | Mod: CPTII,S$GLB,, | Performed by: INTERNAL MEDICINE

## 2023-05-10 PROCEDURE — 1101F PT FALLS ASSESS-DOCD LE1/YR: CPT | Mod: CPTII,S$GLB,, | Performed by: INTERNAL MEDICINE

## 2023-05-10 PROCEDURE — 3078F PR MOST RECENT DIASTOLIC BLOOD PRESSURE < 80 MM HG: ICD-10-PCS | Mod: CPTII,S$GLB,, | Performed by: INTERNAL MEDICINE

## 2023-05-10 PROCEDURE — 3078F DIAST BP <80 MM HG: CPT | Mod: CPTII,S$GLB,, | Performed by: INTERNAL MEDICINE

## 2023-05-10 PROCEDURE — 3008F BODY MASS INDEX DOCD: CPT | Mod: CPTII,S$GLB,, | Performed by: INTERNAL MEDICINE

## 2023-05-10 PROCEDURE — 3008F PR BODY MASS INDEX (BMI) DOCUMENTED: ICD-10-PCS | Mod: CPTII,S$GLB,, | Performed by: INTERNAL MEDICINE

## 2023-05-10 PROCEDURE — 99214 PR OFFICE/OUTPT VISIT, EST, LEVL IV, 30-39 MIN: ICD-10-PCS | Mod: S$GLB,,, | Performed by: INTERNAL MEDICINE

## 2023-05-10 PROCEDURE — 1160F RVW MEDS BY RX/DR IN RCRD: CPT | Mod: CPTII,S$GLB,, | Performed by: INTERNAL MEDICINE

## 2023-05-10 PROCEDURE — 99214 OFFICE O/P EST MOD 30 MIN: CPT | Mod: S$GLB,,, | Performed by: INTERNAL MEDICINE

## 2023-05-10 PROCEDURE — 1160F PR REVIEW ALL MEDS BY PRESCRIBER/CLIN PHARMACIST DOCUMENTED: ICD-10-PCS | Mod: CPTII,S$GLB,, | Performed by: INTERNAL MEDICINE

## 2023-05-10 PROCEDURE — 99999 PR PBB SHADOW E&M-EST. PATIENT-LVL V: CPT | Mod: PBBFAC,,, | Performed by: INTERNAL MEDICINE

## 2023-05-10 PROCEDURE — 1159F MED LIST DOCD IN RCRD: CPT | Mod: CPTII,S$GLB,, | Performed by: INTERNAL MEDICINE

## 2023-05-10 PROCEDURE — 99999 PR PBB SHADOW E&M-EST. PATIENT-LVL V: ICD-10-PCS | Mod: PBBFAC,,, | Performed by: INTERNAL MEDICINE

## 2023-05-10 PROCEDURE — 1126F PR PAIN SEVERITY QUANTIFIED, NO PAIN PRESENT: ICD-10-PCS | Mod: CPTII,S$GLB,, | Performed by: INTERNAL MEDICINE

## 2023-05-10 PROCEDURE — 3074F SYST BP LT 130 MM HG: CPT | Mod: CPTII,S$GLB,, | Performed by: INTERNAL MEDICINE

## 2023-05-10 PROCEDURE — 3044F PR MOST RECENT HEMOGLOBIN A1C LEVEL <7.0%: ICD-10-PCS | Mod: CPTII,S$GLB,, | Performed by: INTERNAL MEDICINE

## 2023-05-10 PROCEDURE — 3044F HG A1C LEVEL LT 7.0%: CPT | Mod: CPTII,S$GLB,, | Performed by: INTERNAL MEDICINE

## 2023-05-10 PROCEDURE — 1101F PR PT FALLS ASSESS DOC 0-1 FALLS W/OUT INJ PAST YR: ICD-10-PCS | Mod: CPTII,S$GLB,, | Performed by: INTERNAL MEDICINE

## 2023-05-10 PROCEDURE — 1159F PR MEDICATION LIST DOCUMENTED IN MEDICAL RECORD: ICD-10-PCS | Mod: CPTII,S$GLB,, | Performed by: INTERNAL MEDICINE

## 2023-05-10 PROCEDURE — 3066F NEPHROPATHY DOC TX: CPT | Mod: CPTII,S$GLB,, | Performed by: INTERNAL MEDICINE

## 2023-05-10 RX ORDER — OXYBUTYNIN CHLORIDE 10 MG/1
10 TABLET, EXTENDED RELEASE ORAL DAILY
Qty: 30 TABLET | Refills: 11 | Status: SHIPPED | OUTPATIENT
Start: 2023-05-10 | End: 2023-05-23 | Stop reason: SDUPTHER

## 2023-05-10 NOTE — TELEPHONE ENCOUNTER
----- Message from Genoveva Heard sent at 5/10/2023  8:05 AM CDT -----  Contact: PEGGY BELLA JR. [[id number]]@ [phone]  Patient has been going to Er for his diarrhea, They told him to see his PCP and he only want to see you no one else. Please call him with an appointment today. Patient ask that you not call in late in the evenings to call him back. He want to be seen today.

## 2023-05-10 NOTE — TELEPHONE ENCOUNTER
Called pt back.   Diarrhea increased recently.  Has been having hypotension.  Will increase NS infusions to twice a week while having diarrhea.    Recommended f/u c PCP, GI regarding diarrhea. He has a PCP appt today.      ----- Message from Gricel Watkins MA sent at 5/10/2023  8:23 AM CDT -----  Regarding: FW: advice; appt  Contact: PT @ [phone]    ----- Message -----  From: Katya Fine  Sent: 5/10/2023   8:13 AM CDT  To: [facility] Staff  Subject: advice; appt                                     Pt called to speak with someone in the office about being seen by Dr. Arevalo for dehydration. Pt states he went the ER three times but wants to speak to the provider first before scheduling. Please contact the PT. Thanks.

## 2023-05-12 ENCOUNTER — INFUSION (OUTPATIENT)
Dept: INFECTIOUS DISEASES | Facility: HOSPITAL | Age: 73
End: 2023-05-12
Attending: INTERNAL MEDICINE
Payer: MEDICARE

## 2023-05-12 VITALS
OXYGEN SATURATION: 96 % | WEIGHT: 210.13 LBS | HEART RATE: 64 BPM | TEMPERATURE: 99 F | SYSTOLIC BLOOD PRESSURE: 169 MMHG | RESPIRATION RATE: 17 BRPM | DIASTOLIC BLOOD PRESSURE: 82 MMHG | BODY MASS INDEX: 26.98 KG/M2

## 2023-05-12 DIAGNOSIS — Z93.3 COLOSTOMY PRESENT: ICD-10-CM

## 2023-05-12 DIAGNOSIS — E86.0 DEHYDRATION: Primary | ICD-10-CM

## 2023-05-12 DIAGNOSIS — N18.32 TYPE 2 DIABETES MELLITUS WITH STAGE 3B CHRONIC KIDNEY DISEASE, WITHOUT LONG-TERM CURRENT USE OF INSULIN: ICD-10-CM

## 2023-05-12 DIAGNOSIS — E11.22 TYPE 2 DIABETES MELLITUS WITH STAGE 3B CHRONIC KIDNEY DISEASE, WITHOUT LONG-TERM CURRENT USE OF INSULIN: ICD-10-CM

## 2023-05-12 PROCEDURE — 25000003 PHARM REV CODE 250: Performed by: NURSE PRACTITIONER

## 2023-05-12 PROCEDURE — 96360 HYDRATION IV INFUSION INIT: CPT

## 2023-05-12 RX ORDER — SODIUM CHLORIDE 0.9 % (FLUSH) 0.9 %
10 SYRINGE (ML) INJECTION
Status: DISCONTINUED | OUTPATIENT
Start: 2023-05-12 | End: 2023-05-12 | Stop reason: HOSPADM

## 2023-05-12 RX ORDER — HEPARIN 100 UNIT/ML
500 SYRINGE INTRAVENOUS
Status: CANCELLED | OUTPATIENT
Start: 2023-05-14

## 2023-05-12 RX ORDER — SODIUM CHLORIDE 0.9 % (FLUSH) 0.9 %
10 SYRINGE (ML) INJECTION
Status: CANCELLED | OUTPATIENT
Start: 2023-05-14

## 2023-05-12 RX ORDER — HEPARIN 100 UNIT/ML
500 SYRINGE INTRAVENOUS
Status: DISCONTINUED | OUTPATIENT
Start: 2023-05-12 | End: 2023-05-12 | Stop reason: HOSPADM

## 2023-05-12 RX ADMIN — SODIUM CHLORIDE 1000 ML: 9 INJECTION, SOLUTION INTRAVENOUS at 08:05

## 2023-05-12 NOTE — PROGRESS NOTES
Subjective:       Patient ID: Jarrett Charlton Jr. is a 73 y.o. male.    Chief Complaint: Diarrhea (Intermittent episodes; Active past 2 days)    HPI   patient visit today in follow-up tension and sometime presyncope history from hypotension ileostomy he continue IV fluids every week in the infusion center patient states his stool coming out like pea soup making him weak and dizzy in Bentyl cholestyramine without any results he denies short of breath chest pain dyspnea with exertion.  He also had chronic elevation of liver enzyme which returned to normal with the last blood test  Review of Systems   Constitutional:  Negative for unexpected weight change.   Respiratory:  Negative for shortness of breath.    Cardiovascular:  Negative for chest pain.   Gastrointestinal:  Positive for abdominal pain.   Musculoskeletal:  Positive for arthralgias.   Psychiatric/Behavioral:  Positive for dysphoric mood.      Objective:      Physical Exam  Vitals and nursing note reviewed.   Constitutional:       General: He is not in acute distress.     Appearance: He is well-developed.   HENT:      Head: Normocephalic and atraumatic.      Right Ear: External ear normal.      Left Ear: External ear normal.      Nose: Nose normal.      Mouth/Throat:      Pharynx: No oropharyngeal exudate.   Eyes:      Extraocular Movements: Extraocular movements intact.      Conjunctiva/sclera: Conjunctivae normal.      Pupils: Pupils are equal, round, and reactive to light.   Neck:      Thyroid: No thyromegaly.   Cardiovascular:      Rate and Rhythm: Normal rate and regular rhythm.      Heart sounds: Normal heart sounds. No murmur heard.    No friction rub. No gallop.   Pulmonary:      Effort: Pulmonary effort is normal. No respiratory distress.      Breath sounds: Normal breath sounds. No wheezing.   Abdominal:      General: Bowel sounds are normal. There is no distension.      Palpations: Abdomen is soft.      Tenderness: There is no abdominal  tenderness.      Comments: Ileostomy with greenish liquid stool   Musculoskeletal:         General: No tenderness or deformity. Normal range of motion.      Cervical back: Normal range of motion and neck supple.   Lymphadenopathy:      Cervical: No cervical adenopathy.   Skin:     General: Skin is warm and dry.      Capillary Refill: Capillary refill takes less than 2 seconds.      Findings: No erythema or rash.   Neurological:      Mental Status: He is alert and oriented to person, place, and time.   Psychiatric:         Thought Content: Thought content normal.         Judgment: Judgment normal.       Assessment:       1. High output ileostomy    2. Type 2 diabetes mellitus with stage 3b chronic kidney disease, without long-term current use of insulin    3. Stage 3b chronic kidney disease    4. Hyperlipidemia, unspecified hyperlipidemia type    5. Essential hypertension        Plan:       High output ileostomy  Comments:  Will try oxybutynin for is anticholenergic   Orders:  -     oxybutynin (DITROPAN-XL) 10 MG 24 hr tablet; Take 1 tablet (10 mg total) by mouth once daily.  Dispense: 30 tablet; Refill: 11    Type 2 diabetes mellitus with stage 3b chronic kidney disease, without long-term current use of insulin  Comments:  Diabetes well control last hemoglobin A1c 2 months ago 6.6 continue with treatment    Stage 3b chronic kidney disease  Comments:  Worsen sometime from dehydration continue to monitor and avoid all NSAIDs    Hyperlipidemia, unspecified hyperlipidemia type  Comments:  Well control with diet and no change in treatment    Essential hypertension  Comments:  Blood pressure well control sometime hypotension from dehydration will keep off own high blood pressure medication for now        Medication List with Changes/Refills   New Medications    OXYBUTYNIN (DITROPAN-XL) 10 MG 24 HR TABLET    Take 1 tablet (10 mg total) by mouth once daily.   Current Medications    ACCU-CHEK PAUL CONTROL SOLN SOLN    1  drop by NOT APPLICABLE route once daily.    ACCU-CHEK SOFTCLIX LANCETS MISC    Accucheck tid    ALLOPURINOL (ZYLOPRIM) 300 MG TABLET    Take 1.5 tablets (450 mg total) by mouth once daily.    ASPIRIN (ECOTRIN) 81 MG EC TABLET    Take 81 mg by mouth once daily.    BD ALCOHOL SWABS PADM    Apply 1 each topically 5 (five) times daily.    BLOOD SUGAR DIAGNOSTIC (ONETOUCH ULTRA TEST) STRP    USE ONE STRIP TO CHECK GLUCOSE EVERY DAY    BLOOD-GLUCOSE METER (ACCU-CHEK GUIDE GLUCOSE METER) [facility] [place]    Accucheck BID    BRIMONIDINE 0.2% (ALPHAGAN) 0.2 % DROP    Place 1 drop into both eyes 2 (two) times daily.    CALCIUM CITRATE-VITAMIN D3 (CITRACAL + D MAXIMUM) 315 MG-6.25 MCG (250 UNIT) TAB    Take 1 tablet by mouth once daily.    CHOLESTYRAMINE (QUESTRAN) 4 GRAM PACKET    Take 1 packet (4 g total) by mouth 3 (three) times daily with meals.    FIBER, CALCIUM POLYCARBOPHIL, 625 MG TABLET    Take 3 tablets by mouth before dinner.    FLUDROCORTISONE (FLORINEF) 0.1 MG TAB    Take 1 tablet (100 mcg total) by mouth once daily.    GABAPENTIN (NEURONTIN) 600 MG TABLET    Take 1 tablet (600 mg total) by mouth 3 (three) times daily.    GLIMEPIRIDE (AMARYL) 4 MG TABLET    Take 1 tablet (4 mg total) by mouth 2 (two) times daily before meals.    HYDRALAZINE (APRESOLINE) 10 MG TABLET    Take 1 tablet (10 mg total) by mouth 3 (three) times daily as needed (Systolic blood pressure > 180).    MAGNESIUM OXIDE 400 MG MAGNESIUM TAB    1 po qdaily    METOPROLOL SUCCINATE (TOPROL-XL) 25 MG 24 HR TABLET    Take 1 tablet (25 mg total) by mouth once daily.    MIDODRINE (PROAMATINE) 10 MG TABLET    Take 1 tablet (10 mg total) by mouth daily as needed (Hypotension).    PANTOPRAZOLE (PROTONIX) 40 MG TABLET    Take 1 tablet (40 mg total) by mouth once daily.

## 2023-05-16 ENCOUNTER — TELEPHONE (OUTPATIENT)
Dept: INFECTIOUS DISEASES | Facility: HOSPITAL | Age: 73
End: 2023-05-16

## 2023-05-19 ENCOUNTER — INFUSION (OUTPATIENT)
Dept: INFECTIOUS DISEASES | Facility: HOSPITAL | Age: 73
End: 2023-05-19
Attending: NURSE PRACTITIONER
Payer: MEDICARE

## 2023-05-19 ENCOUNTER — TELEPHONE (OUTPATIENT)
Dept: PRIMARY CARE CLINIC | Facility: CLINIC | Age: 73
End: 2023-05-19
Payer: MEDICARE

## 2023-05-19 VITALS
HEIGHT: 74 IN | TEMPERATURE: 98 F | OXYGEN SATURATION: 98 % | BODY MASS INDEX: 27.39 KG/M2 | WEIGHT: 213.38 LBS | HEART RATE: 71 BPM | SYSTOLIC BLOOD PRESSURE: 170 MMHG | DIASTOLIC BLOOD PRESSURE: 92 MMHG | RESPIRATION RATE: 18 BRPM

## 2023-05-19 DIAGNOSIS — E11.22 TYPE 2 DIABETES MELLITUS WITH STAGE 3B CHRONIC KIDNEY DISEASE, WITHOUT LONG-TERM CURRENT USE OF INSULIN: ICD-10-CM

## 2023-05-19 DIAGNOSIS — E86.0 DEHYDRATION: Primary | ICD-10-CM

## 2023-05-19 DIAGNOSIS — Z93.3 COLOSTOMY PRESENT: ICD-10-CM

## 2023-05-19 DIAGNOSIS — N18.32 TYPE 2 DIABETES MELLITUS WITH STAGE 3B CHRONIC KIDNEY DISEASE, WITHOUT LONG-TERM CURRENT USE OF INSULIN: ICD-10-CM

## 2023-05-19 PROCEDURE — 96360 HYDRATION IV INFUSION INIT: CPT

## 2023-05-19 PROCEDURE — 25000003 PHARM REV CODE 250: Performed by: NURSE PRACTITIONER

## 2023-05-19 RX ORDER — SODIUM CHLORIDE 0.9 % (FLUSH) 0.9 %
10 SYRINGE (ML) INJECTION
Status: DISCONTINUED | OUTPATIENT
Start: 2023-05-19 | End: 2023-05-19 | Stop reason: HOSPADM

## 2023-05-19 RX ORDER — HEPARIN 100 UNIT/ML
500 SYRINGE INTRAVENOUS
Status: CANCELLED | OUTPATIENT
Start: 2023-05-21

## 2023-05-19 RX ORDER — SODIUM CHLORIDE 0.9 % (FLUSH) 0.9 %
10 SYRINGE (ML) INJECTION
Status: CANCELLED | OUTPATIENT
Start: 2023-05-21

## 2023-05-19 RX ADMIN — SODIUM CHLORIDE 1000 ML: 9 INJECTION, SOLUTION INTRAVENOUS at 08:05

## 2023-05-19 NOTE — PROGRESS NOTES
Patient received 1 liter Normal Saline x 1hr, pt tolerated well & left in NAD, next appt made

## 2023-05-19 NOTE — TELEPHONE ENCOUNTER
"Patient came into office to check for x-ray order that PCP ordered. Nurse tried to ask what patient was ordering chest x-ray for because nurse did not see order or a reason mentioned in office visit. Patient stated he told PCP he hadn't had a chest x-ray in 30 years so he mentioned ordering one. Nurse notified patient he did complete an x-ray in February for his infectious disease doctor. Nurse offered to send MD a message regarding this test, although his insurance may night pay for another one this soon. Patient became irritated and stated "Don't worry about it. This is this first time this happened." Patient proceeded to walk out clinic.    "

## 2023-05-22 ENCOUNTER — TELEPHONE (OUTPATIENT)
Dept: CARDIOLOGY | Facility: CLINIC | Age: 73
End: 2023-05-22
Payer: MEDICARE

## 2023-05-22 NOTE — TELEPHONE ENCOUNTER
LOV 04/24/2023    /72 took midodrine 20 mg rechecked blood pressure.    0830 89/58   0900 86/61 P 75    Patient states the midodrine's not helping to increase blood pressure.

## 2023-05-23 ENCOUNTER — INFUSION (OUTPATIENT)
Dept: INFECTIOUS DISEASES | Facility: HOSPITAL | Age: 73
End: 2023-05-23
Payer: MEDICARE

## 2023-05-23 VITALS
OXYGEN SATURATION: 97 % | SYSTOLIC BLOOD PRESSURE: 174 MMHG | RESPIRATION RATE: 20 BRPM | DIASTOLIC BLOOD PRESSURE: 88 MMHG | HEART RATE: 70 BPM | WEIGHT: 213.94 LBS | TEMPERATURE: 98 F | BODY MASS INDEX: 27.47 KG/M2

## 2023-05-23 DIAGNOSIS — E11.22 TYPE 2 DIABETES MELLITUS WITH STAGE 3B CHRONIC KIDNEY DISEASE, WITHOUT LONG-TERM CURRENT USE OF INSULIN: ICD-10-CM

## 2023-05-23 DIAGNOSIS — E86.0 DEHYDRATION: Primary | ICD-10-CM

## 2023-05-23 DIAGNOSIS — Z93.3 COLOSTOMY PRESENT: ICD-10-CM

## 2023-05-23 DIAGNOSIS — Z93.2 HIGH OUTPUT ILEOSTOMY: ICD-10-CM

## 2023-05-23 DIAGNOSIS — N18.32 TYPE 2 DIABETES MELLITUS WITH STAGE 3B CHRONIC KIDNEY DISEASE, WITHOUT LONG-TERM CURRENT USE OF INSULIN: ICD-10-CM

## 2023-05-23 DIAGNOSIS — R19.8 HIGH OUTPUT ILEOSTOMY: ICD-10-CM

## 2023-05-23 PROCEDURE — 25000003 PHARM REV CODE 250: Performed by: NURSE PRACTITIONER

## 2023-05-23 PROCEDURE — 96360 HYDRATION IV INFUSION INIT: CPT

## 2023-05-23 RX ORDER — SODIUM CHLORIDE 0.9 % (FLUSH) 0.9 %
10 SYRINGE (ML) INJECTION
Status: CANCELLED | OUTPATIENT
Start: 2023-05-26

## 2023-05-23 RX ORDER — HEPARIN 100 UNIT/ML
500 SYRINGE INTRAVENOUS
Status: DISCONTINUED | OUTPATIENT
Start: 2023-05-23 | End: 2023-05-23 | Stop reason: HOSPADM

## 2023-05-23 RX ORDER — SODIUM CHLORIDE 0.9 % (FLUSH) 0.9 %
10 SYRINGE (ML) INJECTION
Status: DISCONTINUED | OUTPATIENT
Start: 2023-05-23 | End: 2023-05-23 | Stop reason: HOSPADM

## 2023-05-23 RX ORDER — HEPARIN 100 UNIT/ML
500 SYRINGE INTRAVENOUS
Status: CANCELLED | OUTPATIENT
Start: 2023-05-26

## 2023-05-23 RX ADMIN — SODIUM CHLORIDE 1000 ML: 9 INJECTION, SOLUTION INTRAVENOUS at 10:05

## 2023-05-23 NOTE — TELEPHONE ENCOUNTER
----- Message from Nannette Gruber sent at 5/23/2023  1:41 PM CDT -----  Contact: [phone]  Requesting an RX refill or new RX.  Is this a refill or new RX: Refill 1   RX name and strength (copy/paste from chart):  oxybutynin (DITROPAN-XL) 10 MG 24 hr tablet  Is this a 30 day or 90 day RX:   Pharmacy name and phone # (copy/paste from chart):  [facility] - [facility] [address]   Phone:  [phone]  Fax:  [phone]        The doctors have asked that we provide their patients with the following 2 reminders -- prescription refills can take up to 72 hours, and a friendly reminder that in the future you can use your [facility] account to request refills:

## 2023-05-23 NOTE — TELEPHONE ENCOUNTER
No care due was identified.  [facility] Embedded Care Due Messages. Reference number: [id number].   5/23/2023 2:59:14 PM CDT

## 2023-05-23 NOTE — PROGRESS NOTES
Patient received 1 liter Normal Saline x 1hr, pt tolerated well & left in NAD, next appt made

## 2023-05-24 RX ORDER — OXYBUTYNIN CHLORIDE 10 MG/1
10 TABLET, EXTENDED RELEASE ORAL DAILY
Qty: 30 TABLET | Refills: 3 | Status: ON HOLD | OUTPATIENT
Start: 2023-05-24 | End: 2023-05-29 | Stop reason: SDUPTHER

## 2023-05-26 ENCOUNTER — INFUSION (OUTPATIENT)
Dept: INFECTIOUS DISEASES | Facility: HOSPITAL | Age: 73
End: 2023-05-26
Payer: MEDICARE

## 2023-05-26 ENCOUNTER — OFFICE VISIT (OUTPATIENT)
Dept: NEPHROLOGY | Facility: CLINIC | Age: 73
End: 2023-05-26
Payer: MEDICARE

## 2023-05-26 ENCOUNTER — HOSPITAL ENCOUNTER (EMERGENCY)
Facility: HOSPITAL | Age: 73
Discharge: PSYCHIATRIC OR SUBSTANCE USE FACILITY | End: 2023-05-26
Attending: EMERGENCY MEDICINE
Payer: MEDICARE

## 2023-05-26 VITALS
HEART RATE: 65 BPM | WEIGHT: 210 LBS | RESPIRATION RATE: 16 BRPM | DIASTOLIC BLOOD PRESSURE: 91 MMHG | SYSTOLIC BLOOD PRESSURE: 148 MMHG | TEMPERATURE: 99 F | RESPIRATION RATE: 16 BRPM | DIASTOLIC BLOOD PRESSURE: 70 MMHG | TEMPERATURE: 98 F | HEART RATE: 61 BPM | BODY MASS INDEX: 27.34 KG/M2 | SYSTOLIC BLOOD PRESSURE: 182 MMHG | WEIGHT: 213 LBS | HEIGHT: 74 IN | OXYGEN SATURATION: 99 % | BODY MASS INDEX: 26.96 KG/M2 | OXYGEN SATURATION: 95 %

## 2023-05-26 VITALS
SYSTOLIC BLOOD PRESSURE: 137 MMHG | WEIGHT: 213.88 LBS | BODY MASS INDEX: 27.45 KG/M2 | DIASTOLIC BLOOD PRESSURE: 75 MMHG | OXYGEN SATURATION: 99 % | HEIGHT: 74 IN | HEART RATE: 64 BPM

## 2023-05-26 DIAGNOSIS — N18.4 CHRONIC KIDNEY DISEASE, STAGE 4 (SEVERE): Primary | ICD-10-CM

## 2023-05-26 DIAGNOSIS — E11.22 TYPE 2 DIABETES MELLITUS WITH STAGE 3B CHRONIC KIDNEY DISEASE, WITHOUT LONG-TERM CURRENT USE OF INSULIN: ICD-10-CM

## 2023-05-26 DIAGNOSIS — R09.89 LABILE BLOOD PRESSURE: ICD-10-CM

## 2023-05-26 DIAGNOSIS — E86.0 DEHYDRATION: Primary | ICD-10-CM

## 2023-05-26 DIAGNOSIS — D64.9 ANEMIA, UNSPECIFIED TYPE: ICD-10-CM

## 2023-05-26 DIAGNOSIS — N17.9 AKI (ACUTE KIDNEY INJURY): ICD-10-CM

## 2023-05-26 DIAGNOSIS — E87.20 ACIDOSIS: ICD-10-CM

## 2023-05-26 DIAGNOSIS — R45.851 SUICIDAL THOUGHTS: ICD-10-CM

## 2023-05-26 DIAGNOSIS — I10 HYPERTENSION, UNSPECIFIED TYPE: ICD-10-CM

## 2023-05-26 DIAGNOSIS — F32.A DEPRESSION, UNSPECIFIED DEPRESSION TYPE: Primary | ICD-10-CM

## 2023-05-26 DIAGNOSIS — N18.32 TYPE 2 DIABETES MELLITUS WITH STAGE 3B CHRONIC KIDNEY DISEASE, WITHOUT LONG-TERM CURRENT USE OF INSULIN: ICD-10-CM

## 2023-05-26 DIAGNOSIS — K94.19 ALTERED BOWEL ELIMINATION DUE TO INTESTINAL OSTOMY: ICD-10-CM

## 2023-05-26 DIAGNOSIS — N18.4 TYPE 2 DIABETES MELLITUS WITH STAGE 4 CHRONIC KIDNEY DISEASE, UNSPECIFIED WHETHER LONG TERM INSULIN USE: ICD-10-CM

## 2023-05-26 DIAGNOSIS — E11.22 TYPE 2 DIABETES MELLITUS WITH STAGE 4 CHRONIC KIDNEY DISEASE, UNSPECIFIED WHETHER LONG TERM INSULIN USE: ICD-10-CM

## 2023-05-26 DIAGNOSIS — N25.81 SECONDARY HYPERPARATHYROIDISM: ICD-10-CM

## 2023-05-26 DIAGNOSIS — Z93.3 COLOSTOMY PRESENT: ICD-10-CM

## 2023-05-26 DIAGNOSIS — R45.851 SUICIDAL IDEATION: ICD-10-CM

## 2023-05-26 LAB
ALBUMIN SERPL BCP-MCNC: 3.5 G/DL (ref 3.5–5.2)
ALP SERPL-CCNC: 197 U/L (ref 55–135)
ALT SERPL W/O P-5'-P-CCNC: 34 U/L (ref 10–44)
AMPHET+METHAMPHET UR QL: NEGATIVE
ANION GAP SERPL CALC-SCNC: 15 MMOL/L (ref 8–16)
APAP SERPL-MCNC: <3 UG/ML (ref 10–20)
AST SERPL-CCNC: 43 U/L (ref 10–40)
BACTERIA #/AREA URNS AUTO: ABNORMAL /HPF
BARBITURATES UR QL SCN>200 NG/ML: NEGATIVE
BASOPHILS # BLD AUTO: 0.04 K/UL (ref 0–0.2)
BASOPHILS NFR BLD: 0.4 % (ref 0–1.9)
BENZODIAZ UR QL SCN>200 NG/ML: NEGATIVE
BILIRUB SERPL-MCNC: 0.6 MG/DL (ref 0.1–1)
BILIRUB UR QL STRIP: NEGATIVE
BUN SERPL-MCNC: 34 MG/DL (ref 8–23)
BZE UR QL SCN: NEGATIVE
CALCIUM SERPL-MCNC: 9.3 MG/DL (ref 8.7–10.5)
CANNABINOIDS UR QL SCN: NEGATIVE
CHLORIDE SERPL-SCNC: 103 MMOL/L (ref 95–110)
CLARITY UR REFRACT.AUTO: CLEAR
CO2 SERPL-SCNC: 22 MMOL/L (ref 23–29)
COLOR UR AUTO: YELLOW
CREAT SERPL-MCNC: 2.9 MG/DL (ref 0.5–1.4)
CREAT UR-MCNC: 180 MG/DL (ref 23–375)
DIFFERENTIAL METHOD: ABNORMAL
EOSINOPHIL # BLD AUTO: 0.1 K/UL (ref 0–0.5)
EOSINOPHIL NFR BLD: 1.3 % (ref 0–8)
ERYTHROCYTE [DISTWIDTH] IN BLOOD BY AUTOMATED COUNT: 14.4 % (ref 11.5–14.5)
EST. GFR  (NO RACE VARIABLE): 22 ML/MIN/1.73 M^2
ETHANOL SERPL-MCNC: <10 MG/DL
GLUCOSE SERPL-MCNC: 132 MG/DL (ref 70–110)
GLUCOSE UR QL STRIP: NEGATIVE
HCT VFR BLD AUTO: 41 % (ref 40–54)
HCV AB SERPL QL IA: NORMAL
HGB BLD-MCNC: 12.7 G/DL (ref 14–18)
HGB UR QL STRIP: NEGATIVE
HIV 1+2 AB+HIV1 P24 AG SERPL QL IA: NORMAL
HYALINE CASTS UR QL AUTO: 0 /LPF
IMM GRANULOCYTES # BLD AUTO: 0.04 K/UL (ref 0–0.04)
IMM GRANULOCYTES NFR BLD AUTO: 0.4 % (ref 0–0.5)
KETONES UR QL STRIP: NEGATIVE
LEUKOCYTE ESTERASE UR QL STRIP: NEGATIVE
LYMPHOCYTES # BLD AUTO: 1.8 K/UL (ref 1–4.8)
LYMPHOCYTES NFR BLD: 17 % (ref 18–48)
MCH RBC QN AUTO: 28.3 PG (ref 27–31)
MCHC RBC AUTO-ENTMCNC: 31 G/DL (ref 32–36)
MCV RBC AUTO: 91 FL (ref 82–98)
METHADONE UR QL SCN>300 NG/ML: NEGATIVE
MICROSCOPIC COMMENT: ABNORMAL
MONOCYTES # BLD AUTO: 0.6 K/UL (ref 0.3–1)
MONOCYTES NFR BLD: 5.5 % (ref 4–15)
NEUTROPHILS # BLD AUTO: 7.9 K/UL (ref 1.8–7.7)
NEUTROPHILS NFR BLD: 75.4 % (ref 38–73)
NITRITE UR QL STRIP: NEGATIVE
NRBC BLD-RTO: 0 /100 WBC
OPIATES UR QL SCN: NEGATIVE
PCP UR QL SCN>25 NG/ML: NEGATIVE
PH UR STRIP: 6 [PH] (ref 5–8)
PLATELET # BLD AUTO: 240 K/UL (ref 150–450)
PMV BLD AUTO: 10.3 FL (ref 9.2–12.9)
POCT GLUCOSE: 98 MG/DL (ref 70–110)
POTASSIUM SERPL-SCNC: 5 MMOL/L (ref 3.5–5.1)
PROT SERPL-MCNC: 8.5 G/DL (ref 6–8.4)
PROT UR QL STRIP: ABNORMAL
RBC # BLD AUTO: 4.49 M/UL (ref 4.6–6.2)
RBC #/AREA URNS AUTO: 0 /HPF (ref 0–4)
SODIUM SERPL-SCNC: 140 MMOL/L (ref 136–145)
SP GR UR STRIP: 1.01 (ref 1–1.03)
SQUAMOUS #/AREA URNS AUTO: 3 /HPF
TOXICOLOGY INFORMATION: NORMAL
TSH SERPL DL<=0.005 MIU/L-ACNC: 1.83 UIU/ML (ref 0.4–4)
URN SPEC COLLECT METH UR: ABNORMAL
WBC # BLD AUTO: 10.52 K/UL (ref 3.9–12.7)
WBC #/AREA URNS AUTO: 7 /HPF (ref 0–5)

## 2023-05-26 PROCEDURE — 80053 COMPREHEN METABOLIC PANEL: CPT | Performed by: EMERGENCY MEDICINE

## 2023-05-26 PROCEDURE — 1159F MED LIST DOCD IN RCRD: CPT | Mod: CPTII,S$GLB,, | Performed by: NURSE PRACTITIONER

## 2023-05-26 PROCEDURE — 99215 PR OFFICE/OUTPT VISIT, EST, LEVL V, 40-54 MIN: ICD-10-PCS | Mod: S$GLB,,, | Performed by: NURSE PRACTITIONER

## 2023-05-26 PROCEDURE — 1159F PR MEDICATION LIST DOCUMENTED IN MEDICAL RECORD: ICD-10-PCS | Mod: CPTII,S$GLB,, | Performed by: NURSE PRACTITIONER

## 2023-05-26 PROCEDURE — 99285 EMERGENCY DEPT VISIT HI MDM: CPT | Mod: 25

## 2023-05-26 PROCEDURE — 99285 PR EMERGENCY DEPT VISIT,LEVEL V: ICD-10-PCS | Mod: ,,, | Performed by: EMERGENCY MEDICINE

## 2023-05-26 PROCEDURE — 3066F NEPHROPATHY DOC TX: CPT | Mod: CPTII,S$GLB,, | Performed by: NURSE PRACTITIONER

## 2023-05-26 PROCEDURE — 25000003 PHARM REV CODE 250: Performed by: EMERGENCY MEDICINE

## 2023-05-26 PROCEDURE — 99999 PR PBB SHADOW E&M-EST. PATIENT-LVL V: ICD-10-PCS | Mod: PBBFAC,,, | Performed by: NURSE PRACTITIONER

## 2023-05-26 PROCEDURE — 3066F PR DOCUMENTATION OF TREATMENT FOR NEPHROPATHY: ICD-10-PCS | Mod: CPTII,S$GLB,, | Performed by: NURSE PRACTITIONER

## 2023-05-26 PROCEDURE — 1101F PT FALLS ASSESS-DOCD LE1/YR: CPT | Mod: CPTII,S$GLB,, | Performed by: NURSE PRACTITIONER

## 2023-05-26 PROCEDURE — 81001 URINALYSIS AUTO W/SCOPE: CPT | Performed by: EMERGENCY MEDICINE

## 2023-05-26 PROCEDURE — 1101F PR PT FALLS ASSESS DOC 0-1 FALLS W/OUT INJ PAST YR: ICD-10-PCS | Mod: CPTII,S$GLB,, | Performed by: NURSE PRACTITIONER

## 2023-05-26 PROCEDURE — 99499 UNLISTED E&M SERVICE: CPT | Mod: HCNC,S$GLB,, | Performed by: NURSE PRACTITIONER

## 2023-05-26 PROCEDURE — 86803 HEPATITIS C AB TEST: CPT | Performed by: PHYSICIAN ASSISTANT

## 2023-05-26 PROCEDURE — 99285 EMERGENCY DEPT VISIT HI MDM: CPT | Mod: ,,, | Performed by: EMERGENCY MEDICINE

## 2023-05-26 PROCEDURE — 87389 HIV-1 AG W/HIV-1&-2 AB AG IA: CPT | Performed by: PHYSICIAN ASSISTANT

## 2023-05-26 PROCEDURE — 3288F FALL RISK ASSESSMENT DOCD: CPT | Mod: CPTII,S$GLB,, | Performed by: NURSE PRACTITIONER

## 2023-05-26 PROCEDURE — 3044F HG A1C LEVEL LT 7.0%: CPT | Mod: CPTII,S$GLB,, | Performed by: NURSE PRACTITIONER

## 2023-05-26 PROCEDURE — 3008F PR BODY MASS INDEX (BMI) DOCUMENTED: ICD-10-PCS | Mod: CPTII,S$GLB,, | Performed by: NURSE PRACTITIONER

## 2023-05-26 PROCEDURE — 3044F PR MOST RECENT HEMOGLOBIN A1C LEVEL <7.0%: ICD-10-PCS | Mod: CPTII,S$GLB,, | Performed by: NURSE PRACTITIONER

## 2023-05-26 PROCEDURE — 3078F PR MOST RECENT DIASTOLIC BLOOD PRESSURE < 80 MM HG: ICD-10-PCS | Mod: CPTII,S$GLB,, | Performed by: NURSE PRACTITIONER

## 2023-05-26 PROCEDURE — 80307 DRUG TEST PRSMV CHEM ANLYZR: CPT | Performed by: EMERGENCY MEDICINE

## 2023-05-26 PROCEDURE — 3288F PR FALLS RISK ASSESSMENT DOCUMENTED: ICD-10-PCS | Mod: CPTII,S$GLB,, | Performed by: NURSE PRACTITIONER

## 2023-05-26 PROCEDURE — 99417 PR PROLONGED SVC, OUTPT, W/WO DIRECT PT CONTACT,  EA ADDTL 15 MIN: ICD-10-PCS | Mod: S$GLB,,, | Performed by: NURSE PRACTITIONER

## 2023-05-26 PROCEDURE — 96360 HYDRATION IV INFUSION INIT: CPT

## 2023-05-26 PROCEDURE — 1126F AMNT PAIN NOTED NONE PRSNT: CPT | Mod: CPTII,S$GLB,, | Performed by: NURSE PRACTITIONER

## 2023-05-26 PROCEDURE — 99999 PR PBB SHADOW E&M-EST. PATIENT-LVL V: CPT | Mod: PBBFAC,,, | Performed by: NURSE PRACTITIONER

## 2023-05-26 PROCEDURE — 25000003 PHARM REV CODE 250: Performed by: NURSE PRACTITIONER

## 2023-05-26 PROCEDURE — 1126F PR PAIN SEVERITY QUANTIFIED, NO PAIN PRESENT: ICD-10-PCS | Mod: CPTII,S$GLB,, | Performed by: NURSE PRACTITIONER

## 2023-05-26 PROCEDURE — 1160F PR REVIEW ALL MEDS BY PRESCRIBER/CLIN PHARMACIST DOCUMENTED: ICD-10-PCS | Mod: CPTII,S$GLB,, | Performed by: NURSE PRACTITIONER

## 2023-05-26 PROCEDURE — 99215 OFFICE O/P EST HI 40 MIN: CPT | Mod: S$GLB,,, | Performed by: NURSE PRACTITIONER

## 2023-05-26 PROCEDURE — 99417 PROLNG OP E/M EACH 15 MIN: CPT | Mod: S$GLB,,, | Performed by: NURSE PRACTITIONER

## 2023-05-26 PROCEDURE — 84443 ASSAY THYROID STIM HORMONE: CPT | Performed by: EMERGENCY MEDICINE

## 2023-05-26 PROCEDURE — 85025 COMPLETE CBC W/AUTO DIFF WBC: CPT | Performed by: EMERGENCY MEDICINE

## 2023-05-26 PROCEDURE — 3078F DIAST BP <80 MM HG: CPT | Mod: CPTII,S$GLB,, | Performed by: NURSE PRACTITIONER

## 2023-05-26 PROCEDURE — 1160F RVW MEDS BY RX/DR IN RCRD: CPT | Mod: CPTII,S$GLB,, | Performed by: NURSE PRACTITIONER

## 2023-05-26 PROCEDURE — 99499 RISK ADDL DX/OHS AUDIT: ICD-10-PCS | Mod: HCNC,S$GLB,, | Performed by: NURSE PRACTITIONER

## 2023-05-26 PROCEDURE — 3075F PR MOST RECENT SYSTOLIC BLOOD PRESS GE 130-139MM HG: ICD-10-PCS | Mod: CPTII,S$GLB,, | Performed by: NURSE PRACTITIONER

## 2023-05-26 PROCEDURE — 3008F BODY MASS INDEX DOCD: CPT | Mod: CPTII,S$GLB,, | Performed by: NURSE PRACTITIONER

## 2023-05-26 PROCEDURE — 3075F SYST BP GE 130 - 139MM HG: CPT | Mod: CPTII,S$GLB,, | Performed by: NURSE PRACTITIONER

## 2023-05-26 PROCEDURE — 82077 ASSAY SPEC XCP UR&BREATH IA: CPT | Performed by: EMERGENCY MEDICINE

## 2023-05-26 PROCEDURE — 80143 DRUG ASSAY ACETAMINOPHEN: CPT | Performed by: EMERGENCY MEDICINE

## 2023-05-26 RX ORDER — HYDRALAZINE HYDROCHLORIDE 25 MG/1
25 TABLET, FILM COATED ORAL
Status: COMPLETED | OUTPATIENT
Start: 2023-05-26 | End: 2023-05-26

## 2023-05-26 RX ORDER — SODIUM CHLORIDE 0.9 % (FLUSH) 0.9 %
10 SYRINGE (ML) INJECTION
Status: CANCELLED | OUTPATIENT
Start: 2023-05-30

## 2023-05-26 RX ORDER — HEPARIN 100 UNIT/ML
500 SYRINGE INTRAVENOUS
Status: CANCELLED | OUTPATIENT
Start: 2023-05-30

## 2023-05-26 RX ORDER — SODIUM CHLORIDE 0.9 % (FLUSH) 0.9 %
10 SYRINGE (ML) INJECTION
Status: DISCONTINUED | OUTPATIENT
Start: 2023-05-26 | End: 2023-05-26 | Stop reason: HOSPADM

## 2023-05-26 RX ORDER — HEPARIN 100 UNIT/ML
500 SYRINGE INTRAVENOUS
Status: DISCONTINUED | OUTPATIENT
Start: 2023-05-26 | End: 2023-05-26 | Stop reason: HOSPADM

## 2023-05-26 RX ADMIN — SODIUM CHLORIDE 1000 ML: 9 INJECTION, SOLUTION INTRAVENOUS at 08:05

## 2023-05-26 RX ADMIN — HYDRALAZINE HYDROCHLORIDE 25 MG: 25 TABLET, FILM COATED ORAL at 05:05

## 2023-05-26 NOTE — Clinical Note
Alicia Gonzalez, I am concerned that Mr. Charlton is depressed, especially as a result of his current health status. Pls see attached note. I sent him to ER today for further evaluation after receiving a conditional suicide threat.  More information in attached note.  He also continuing to have significant ostomy output despite the oxybutynin.

## 2023-05-26 NOTE — ED NOTES
Pt changed into facility attire  Pt's belongings in closet:   -pair of boots   -white undershirt   -tan hat   -blue jacket   -blue jumpsuit    Pt's belongings in safe:   -phone   -keys   -wallet

## 2023-05-26 NOTE — PROGRESS NOTES
"  Subjective:       Patient ID: Jarrett Charlton Jr. is a 73 y.o. White male who presents for follow-up evaluation of   CKD    HPI    Last seen by me in May 2021. Previously seen by Dr. Lyon in Aug 2019.      Patient presents for f/u of CKD.  Baseline creatinine had been 1.3-1.5 c frequent increases to 1.6-1.8 range an occasional AKIs c higher sCr. Had GRADY in late December 2019 c peak sCr of 2.3 mg/dL 2/2 volume depletion from high output in ileostomy. Has had hypomagnesemia in the past.  Had K of 5.9 in November; provided with K finder. Since December 2019, new sCr baseline 1.4-1.6 mg/dL. Has been 2.0-2.1 since May 2021.    Home BPs: high in morning; 15 minutes after waking it decreases and is "low all day" until about 5 p.m., it goes up, then he takes his medications.  Recent loop recorder implanted. Has been getting IVF weekly to every other week by cardiologist at [facility]. Also recently started Florinef by PCP.    Heart cath on 4/15/21; no significant blockages. Was sent to [facility] and told he needs a pacemaker. Says this is scheduled 5/13. Has been told to be taking pedialyte; still with diarrhea occasionally but much improved overall.    Significant medical problems include Hypertension for 5 years, DM for 3 yrs, gout, ulcerative colitis with ostomy and hyperlipidemia, prostate cancer    The patient denies taking NSAIDs.  Had IV contrast with cardiologist for MRI on 4/3/2020.     Social history: retired, took care of wheelchair-bound wife, but she passed away in July 2020  Significant family hx includes: no known kidney disease in family.    Last renal US: Sept 2019, reviewed. Simple left renal cyst.    Update August 2021:  Colostomy output still more solid "oatmeal" texture with water. Takes immodium if output is more liquid.  Still getting IVF infusions every other week by cardiology.  No recent syncopal episodes.  Home BPs: occasionally SBP dropping to 100; takes BP prior to taking meds and holds meds " "if needed.  Did not take meds yet today because pressure was low today.  Now on Northera for low BP per cardiology.    Denies NSAIDs; no recent hospitalizations.     Update December 2021:   Returns for f/u of CKD.  Baseline sCr 1.7-1.8 mg/dL.  Home BPs: 130-160/60-90; occasionally drops to SBP of high 80s and takes midodrine   Feels dizzy and lightheaded when blood glucose drops below 80  Denies NSAIDs; no recent hospitalizations.   Diarrhea now being treated by Dr. Baum as IBS. Viberzi was just approved but has not been started yet. Still with intermittent diarrhea. Still getting IVF per cardiology every other week.    Update 4/4/22:  Returns for f/u of CKD.  Baseline sCr 1.7-1.8 mg/dL.  Recent sCr 1.3-1.5 during admission for small bowel obstruction March 9-March 11.  Still gets IVF c Dr. Dao (cardiology) q 2 weeks. Dr. Dao is moving to [place], so pt may not be able to drive for this every two weeks. Next IVF is scheduled for 4/15/22.  Home BPs: labile at home. He takes Wendy if SBP gets as low as 110 and he takes his antihypertensives when SBP gets to 130-140. He took his antihypertensive medication while in the office today.    Update 4/11/22:  Returns for f/u of CKD.  Baseline sCr 1.7-1.8 mg/dL.  After appt on 4/4, pt's sCr returned at 3.0. Sent to ED. He was given IVF and had ileoscopy. sCr returned to previous baseline prior to discharge on 4/6.    Had IVF infusion on Friday 4/8/22 c cardiology. Had "bad" diarrhea on Saturday morning. Took immodium. He also vomited x 3. He became very weak and could not get off the bathroom floor, where he was sitting to be near toilet. Eventually he regained strength and was able to get up. He did not fall.  He has been drinking plenty of fluid and electrolyte fluids since that time. Stool output has returned to normal. No more vomiting. Says he feels "great" now.    Said he did a tilt table test years ago (before blood pressure issues), and it was normal at that " "time. Says drifts to the side when he walks. Says he gets dizzy if he stands up too fast.    Update 7/11/22:  Returns for f/u of CKD.  Was in ER with hypotension (SBPs 70s) 2/2 diarrhea output. Says he starts to feel tired when SBP drops to 105 mmHg.  Taking metamucil daily to help make stool more firm. Takes immodium when having diarrhea episodes. Says he tried to see GI but was unable to get an appt.  Home BPs: labile 80s-160s/60-90s. Followed closely by Dr. Dao  (outside cariologist).  Recently increased droxidopa to 600 mg BID per Dr. Dao. Also requesting to have weekly IVF instead every other week.    Update 8/1/22:  Returns for f/u GRADY.  sCr 3.5 mg/dL recently.  SBP in 80s frequently. Now on Northera 600 mg 4x/day per cardiologist. Carvedilol reduced to 6.25 .  Says he has not had liquid stools.  Says his cardiologist wants him to have tilt table test.    Update 11/4/22:  Returns for f/u of CKD.  Since last visit, he had a Tilt table test with his cardiologist. It was positive for "significant orthostatic hypotension and probably neurocardiogenic syncope of the mixed type." Cardiologist increased midodrine yesterday to 5 mg (from 2.5 mg) due to SBP in 80s.  No recent labs.  Home BPs: SBP 80s since Tuesday  Had 1L NS this morning.    Update 3/10/23:  Returns for f/u of CKD and for hospital follow up.  Baseline appears to be 2.3-2.4 mg/dL. Has been in this range since June 2022.  Home BPs: BPs are still labile. It was 80/66 last night. He had to take midorone 10 mg x 2 (about 4 hours apart).  Said BP dropped prior to having diarrhea yesterday, but he has had significant diarrhea overnight too.    Admitted 2/9/23 - 2/16/23 and had cholecystectomy.  Seen in ER 2/24 c wound dehiscence.     He has switched cardiologists and has had most of his BP medications changed.    Takes [employer] and glucerna    Update 5/26/23:  Returns for f/u of CKD.  Baseline appears to be 2.3-2.4 mg/dL. Most recent sCr of 2.9 in ER " "c hypotension  IVF increased to twice a week again due to high output of ileostomy and multiple ER visits.  PCP started oxybutynin to help with output. He does not feel like he's seen a significant improvement in output.    Says his new cardiologist has him on midodrine and PRN hydralazine for SBP > 180. Says SBP has been in 80s for the past couple days.         Review of Systems   Fatigue, especially in mornings.  ENT - Reports hoarsness.  Cardiovascular:  No swelling to legs. Denies chest pain, palpitations.  Gastrointestinal:  Liquid stools via ostomy  Genitourinary: Negative for difficulty urinating, dysuria, hematuria. Reports good urine output.  Patient endorses suicidal ideation.        Objective:       Blood pressure 137/75, pulse 64, height 6' 2" (1.88 m), weight 97 kg (213 lb 13.5 oz), SpO2 99 %.  Physical Exam  Vitals signs reviewed.   Constitutional:       General: He is not in acute distress.     Appearance: Normal appearance. He is well-developed. He is not ill-appearing or diaphoretic.   Cardiac: normal rate; no edema to BLE  Pulmonary:  NAD    Skin:     General: Skin is warm and dry.     Neurological:      Mental Status: He is alert and oriented to person, place, and time.   Psychiatric:         Mood and Affect: Mood normal.         Behavior: Behavior normal.         Thought Content: Thought content normal.         Judgment: Judgment normal.         Lab Results   Component Value Date    CREATININE 2.9 (H) 05/09/2023    URICACID 4.8 09/20/2022     Prot/Creat Ratio, Urine   Date Value Ref Range Status   03/14/2023 0.10 0.00 - 0.20 Final   01/31/2023 0.17 0.00 - 0.20 Final   11/23/2022 0.23 (H) 0.00 - 0.20 Final     Lab Results   Component Value Date     05/09/2023    K 4.1 05/09/2023    CO2 22 (L) 05/09/2023     05/09/2023     Lab Results   Component Value Date    .6 (H) 01/30/2023    CALCIUM 9.6 05/09/2023    PHOS 3.5 03/14/2023     Lab Results   Component Value Date    HGB 13.1 (L) " 05/09/2023    WBC 9.44 05/09/2023    HCT 40.5 05/09/2023      Lab Results   Component Value Date    HGBA1C 6.6 (H) 02/22/2023     05/09/2023    BUN 51 (H) 05/09/2023     Lab Results   Component Value Date    LDLCALC 70.0 03/14/2023         Outside labs 10/24/19  Crt: 1.5 mg/dL  Hgb: 15.2 g/dL  CO2: 20 mmol/L  K: 5.9 mmol/L  A1c: 7.1%    Assessment:       1. Chronic kidney disease, stage 4 (severe)    2. Suicidal ideation    3. Anemia, unspecified type    4. Altered bowel elimination due to intestinal ostomy    5. Acidosis    6. Secondary hyperparathyroidism    7. Labile blood pressure    8. Hypertension, unspecified type    9. Type 2 diabetes mellitus with stage 4 chronic kidney disease, unspecified whether long term insulin use    10. GRADY (acute kidney injury)            Plan:     CKD stage 3B c eGFR 43-50 mL/min c superimposed GRADY-  CKD clinically 2/2 age-related nephron loss, HTN, previous NSAID use for gout, volume depletion episodes c high ostomy output. Labile sCr. Progressive, clinically from frequent AKIs 2/2 hypotensive episodes. Having increased stool output again, which will appears to contribute to hypotensive episodes and recent GRADY.  No longer using NSAIDs;  At risk for progression given frequent AKIs. On PPI.     Has been getting 1L NS twice weekly.  Uroscopy 8/1/22: a few granular casts.    Has been hypotensive the past couple days, but BP is WNL today. Will repeat labs when pt sees cardiologist on Monday.    UPCR Trace proteinuria on dipstick. Needs repeat UPCR, but it has been negative historically.  Lisinopril previously d/c to help decrease risk of frequent AKIs.     Acid-base Mild acidosis on last check.    Repeat labs next week    Renal osteodystrophy PTH mildly elevated. Ca okay at last check. On calcium and vitamin D3.   Anemia Hgb at goal for CKD.    DM Well-controlled.   Lipid Management On statin per PCP   ESRD Planning Anticipatory guidance provided about timing of dialysis. Start  discussions and planning when eGFR is about 20 mL/min; most patients start dialysis between 5-10 mL/min.    Previously, pt indicated that he would not want dialysis if he reached ESRD. Today he indicated that he is not sure if he would want dialysis or not. Will plan to refer to education to learn about options. Of note, unclear if how his BP will tolerate HD at this point. Unclear of PD candidacy with ileostomy.       HTN - Still labile. Cardiology adjusting medications. On metoprolol 25 succinate mg  - Has PRN hydralazine 10 mg TID if SBP is > 180  - Has midodrine 10 mg PRN TID; unclear if he is still taking florinef.    - Allow for permissive HTN due to high propensity for hypotensive episodes causing AKIs  - Recommend that SBP be maintained over 110  - aldosterone slightly elevated, possibly 2/2 volume losses through ostomy. Carvedilol may suppress renin (he was on carvedilol at time of renin/saundra ratio)..  - Normetanephrine elevation not impressive, especially given level of CKD  - Tilt table test showed significant orthostatic hypotension and probable neurocardiogenic syncope of mixed type. Will defer management of blood pressure to cardiologist with this diagnosis.    Diarrhea/ high output ostomy - per GI and PCP.   - Was on imodium and daily fiber; unclear if he is still taking. Has Questran prescribed.   - PCP started oxybutynin recently; pt says he does not feel this helps  - Pt was initially started on weekly IVF but Dr. Dao due to ostomy output and hypotension episodes.    - Infusions were briefly increased to twice a week recently due to concern for polyuria and volume depletion from polyuria; they were decreased down to weekly when 24 hour urine collection x 2 ws negative for polyuria.   - Pt having large output of liquid stool again. Having hypotensive episodes and AKIs and frequent ER visits. IVF infusions increased to twice weekly again    Suicidal ideation - Patient indicated that he feels like  "giving up. When asked if he had thoughts of harming himself or harming anyone else, he stated that he thinks of harming himself every day. When asked if he had a plan to kill himself, he initially said, "No." He then stated, "If someone is going to kill himself, you can't stop him. He's going to do it. People who try to kill themselves and don't succeed don't really want to die."  Asked further if he is planning to kill himself. He said that he would see what his repeat labs show on Monday. Asked for clarification if patient would consider suicide if his kidney function declined further. He responded, "Yes."   -  Rached out to clinic staff who evaluated patient. Recommended pt to see Sebastián in ER; however, he was not willing initially. Therefore, needed to PEC patient in order to have psych evaluation completed in ER.  - Pt was escorted to ER by [facility] security. Report called to GHASSAN Suarez.    All questions patient had were answered.  Asked if further questions. None. F/u in 3 mos with labs and urine prior to next visit or sooner if needed.  ER for emergency concerns.    Summary of Plan:  PEC and send to ER for further evaluation of conditional suicide threat  2. UA, UPCR, BMP Monday  3. avoid NSAID/ bactrim/ IV contrast/ gadolinium/ aminoglycoside where possible  4. ESRD treatment choices class  5. RTC in 3 mos     75 minutes of total time spent on the encounter, which includes face to face time and non-face to face time preparing to see the patient (eg, review of tests), Obtaining and/or reviewing separately obtained history, documenting clinical information in the electronic or other health record, independently interpreting results (not separately reported) and communicating results to the patient/family/caregiver, or Care coordination (not separately reported).                   "

## 2023-05-26 NOTE — CONSULTS
"    Emergency Psychiatry   Consult Note    5/26/2023 2:31 PM  Jarrett Charlton Jr.  MRN: [id number]    Chief Complaint   Patient presents with    Psychiatric Evaluation     Pt from infusion center. Presents to ED w/ security and PEC. States he had a "slip of the tongue and wish he never said it." Won't state what he said to nurse in infusion center. Denies SI/HI. States he is frustrated and just wants to go.       Reason for Consult: "PEC or no PEC, conditional suicidal statements"    SUBJECTIVE     History of Present Illness:   Jarrett Charlton Jr. is a 73 y.o. male with no known psychiatric history who presented to [facility] ED on 05/26/2023 on PEC filed by nephrologist for SI.     Patient was seen in nephrology clinic earlier today and made suicidal statements as documented by nephrology NP:   "Patient indicated that he feels like giving up. When asked if he had thoughts of harming himself or harming anyone else, he stated that he thinks of harming himself every day. When asked if he had a plan to kill himself, he initially said, 'No.' He then stated, 'If someone is going to kill himself, you can't stop him. He's going to do it. People who try to kill themselves and don't succeed don't really want to die.'  Asked further if he is planning to kill himself. He said that he would see what his repeat labs show on Monday. Asked for clarification if patient would consider suicide if his kidney function declined further. He responded, 'Yes.' "    On my interview with the patient, he confirms that he endorsed suicidal thoughts in nephrology clinic. He says that he needs to watch what he says from now on. Says he is not suicidal and denies all psych symptoms. He is upset about the PEC. Does express concern about his declining health.     I spoke with the patient's daughter, Latisha Charlton on the phone at [phone], who confirms that patient does worry about his declining renal function and labs. She talks to patient daily on " the phone. Denies noticing pt with any recent changes in behavior or affect. Denies any knowledge of substance abuse.     Psychiatric History:  Prior Diagnose(s): no  Current Psychiatric Medications: no  Previous Medication Trials: no  Previous Psychiatric Hospitalizations: no  Previous Suicide Attempts: no  History of Violence: no  Outpatient Psychiatrist: no    Social History:  Marital Status:    Children: 1 daughter and 1 granddaughter  Employment: retired   Housing: stable, 25 yo granddaughter lives with him  Access to Firearm: yes    Substance Abuse History:  Recreational Drugs: denies  Alcohol: denies  Tobacco: denies      Past Medical/Surgical History:  Past Medical History:   Diagnosis Date    Basal cell carcinoma     BPH (benign prostatic hyperplasia)     s/p TURP    Carotid stenosis     Chronic kidney disease     Claudication     Coronary artery disease     DDD (degenerative disc disease) 10/21/2013    Diabetes mellitus with renal complications     Disc disease, degenerative, cervical     Encounter for blood transfusion     GERD (gastroesophageal reflux disease)     Gout, chronic     History of ulcerative colitis     s/p colectomy and ileostomy    HLD (hyperlipidemia)     HTN (hypertension)     Ileostomy in place 1982    RBBB     Squamous cell carcinoma 03/08/2018    Left superior helix near insertion    Squamous cell carcinoma 04/12/2018    Left forearm x 5    Ventricular tachycardia      Past Surgical History:   Procedure Laterality Date    cardiac stents      CATARACT EXTRACTION Bilateral     CERVICAL FUSION      CHOLECYSTECTOMY N/A 2/14/2023    Procedure: CHOLECYSTECTOMY;  Surgeon: Mike Joyce MD;  Location: [facility];  Service: General;  Laterality: N/A;  very high probabilty of converison to open    colectomy and ileostomy  1985    ENDOSCOPIC ULTRASOUND OF UPPER GASTROINTESTINAL TRACT N/A 2/10/2023    Procedure: ULTRASOUND, UPPER GI TRACT, ENDOSCOPIC;  Surgeon: Poli Fuller  MD;  Location: [facility] ENDO;  Service: Endoscopy;  Laterality: N/A;    ERCP N/A 2/10/2023    Procedure: ERCP (ENDOSCOPIC RETROGRADE CHOLANGIOPANCREATOGRAPHY);  Surgeon: Poli Fuller MD;  Location: [facility] ENDO;  Service: Endoscopy;  Laterality: N/A;    EXCISION OF PAROTID GLAND Left 12/18/2020    Procedure: EXCISION, PAROTID GLAND;  Surgeon: Michael Pinzon MD;  Location: [facility] OR 2ND FLR;  Service: ENT;  Laterality: Left;    INSERTION OF IMPLANTABLE LOOP RECORDER  06/07/2021    INSERTION OF IMPLANTABLE LOOP RECORDER Left 6/7/2021    Procedure: INSERTION, IMPLANTABLE LOOP RECORDER;  Surgeon: Romario Dao MD;  Location: [facility] CATH LAB;  Service: Cardiology;  Laterality: Left;    LEFT HEART CATHETERIZATION Right 4/15/2021    Procedure: CATHETERIZATION, HEART, LEFT;  Surgeon: Marcio Jones MD;  Location: [facility] CATH LAB;  Service: Cardiology;  Laterality: Right;    LYSIS OF ADHESIONS N/A 11/9/2020    Procedure: LYSIS, ADHESIONS,  ERAS low;  Surgeon: CED Haley MD;  Location: [facility] OR 2ND FLR;  Service: Colon and Rectal;  Laterality: N/A;    LYSIS OF ADHESIONS N/A 2/14/2023    Procedure: LYSIS, ADHESIONS;  Surgeon: Mike Joyce MD;  Location: [facility];  Service: General;  Laterality: N/A;    POUCHOSCOPY N/A 4/6/2022    Procedure: ENDOSCOPY, POUCH, SMALL INTESTINE, DIAGNOSTIC;  Surgeon: Dieter Juarez MD;  [facility]  Service: Endoscopy;  Laterality: N/A;    REPAIR, HERNIA, PARASTOMAL N/A 11/9/2020    Procedure: REPAIR, HERNIA, PARASTOMAL;  Surgeon: CED Haley MD;  Location: [facility] OR 2ND FLR;  Service: Colon and Rectal;  Laterality: N/A;    TRANSURETHRAL RESECTION OF PROSTATE (TURP) WITHOUT USE OF LASER N/A 1/23/2019    Procedure: TURP, WITHOUT USING LASER BIPOLAR;  Surgeon: Catarino Mota MD;  Location: [facility] OR 1ST FLR;  Service: Urology;  Laterality: N/A;  1.5 HOURS       Home Meds:  Prior to Admission medications    Medication Sig Start Date End Date Taking? Authorizing  Provider   ACCU-CHEK PAUL CONTROL SOLN Soln 1 drop by NOT APPLICABLE route once daily. 2/21/20   Historical Provider   ACCU-CHEK SOFTCLIX LANCETS [facility] Accucheck tid 4/4/23   Poli Gonzalez MD   allopurinoL (ZYLOPRIM) 300 MG tablet Take 1.5 tablets (450 mg total) by mouth once daily. 3/20/23 3/19/24  Reddy Parekh MD   aspirin (ECOTRIN) 81 MG EC tablet Take 81 mg by mouth once daily.    Historical Provider   BD ALCOHOL SWABS PadM Apply 1 each topically 5 (five) times daily. 4/4/23   Poli Gonzalez MD   blood sugar diagnostic (ONETOUCH ULTRA TEST) Str USE ONE STRIP TO CHECK GLUCOSE EVERY DAY 1/19/23   Poli Gonzalez MD   blood-glucose meter (ACCU-CHEK GUIDE GLUCOSE METER) [facility] Accucheck BID 12/15/22   Poli Gonzalez MD   brimonidine 0.2% (ALPHAGAN) 0.2 % Drop Place 1 drop into both eyes 2 (two) times daily. 4/25/23   Poli Gonzalez MD   calcium citrate-vitamin D3 (CITRACAL + D MAXIMUM) 315 mg-6.25 mcg (250 unit) Tab Take 1 tablet by mouth once daily. 10/12/21   Catarino Mota MD   cholestyramine (QUESTRAN) 4 gram packet Take 1 packet (4 g total) by mouth 3 (three) times daily with meals. 3/3/23 3/2/24  Mia Gallagher NP   FIBER, CALCIUM POLYCARBOPHIL, 625 mg tablet Take 3 tablets by mouth before dinner. 12/17/20   Historical Provider   fludrocortisone (FLORINEF) 0.1 mg Tab Take 1 tablet (100 mcg total) by mouth once daily. 4/10/23   Poli Gonzalez MD   gabapentin (NEURONTIN) 600 MG tablet Take 1 tablet (600 mg total) by mouth 3 (three) times daily. 3/20/23 3/19/24  Reddy Parekh MD   glimepiride (AMARYL) 4 MG tablet Take 1 tablet (4 mg total) by mouth 2 (two) times daily before meals. 4/4/23   Poli Gonzalez MD   hydrALAZINE (APRESOLINE) 10 MG tablet Take 1 tablet (10 mg total) by mouth 3 (three) times daily as needed (Systolic blood pressure > 180). 4/24/23 4/23/24  Aj Marrufo MD   magnesium oxide 400 mg magnesium Tab 1 po qdaily 3/15/23   Poli Gonzalez MD   metoprolol succinate (TOPROL-XL) 25 MG 24 hr  tablet Take 1 tablet (25 mg total) by mouth once daily. 3/21/23 3/20/24  Aj Marrufo MD   midodrine (PROAMATINE) 10 MG tablet Take 1 tablet (10 mg total) by mouth daily as needed (Hypotension). 4/4/23 4/3/24  Poli Gonzalez MD   oxybutynin (DITROPAN-XL) 10 MG 24 hr tablet Take 1 tablet (10 mg total) by mouth once daily. 5/24/23 5/23/24  Poli Gonzalez MD   pantoprazole (PROTONIX) 40 MG tablet Take 1 tablet (40 mg total) by mouth once daily. 4/4/23   Poli Gonzalez MD       OBJECTIVE   Vital Signs:  Temp:  [98.2 °F (36.8 °C)-98.7 °F (37.1 °C)]   Pulse:  [64-78]   Resp:  [16-18]   BP: (137-178)/(70-99)   SpO2:  [95 %-99 %]     MENTAL STATUS EXAMINATION  General Appearance: appears stated age, in no acute distress  Arousal: alert with clear sensorium  Behavior: under good behavioral control, reluctant to participate  Movements and Motor Activity: no abnormal involuntary movements noted  Orientation: oriented to person, place, time, and situation  Speech: normal rate, normal rhythm, normal volume  Language: fluent English  Mood: irritated  Affect: mood-congruent  Thought Process: linear, goal-directed  Associations: intact, no loosening of associations  Thought Content and Perceptions:  currently denies SI, denies HI, no hallucinations  Recent and Remote Memory: recent memory intact, remote memory intact  Attention and Concentration: grossly intact, attentive to the conversation and not readily distractible  Fund of Knowledge: grossly intact, used appropriate vocabulary and demonstrated an awareness of current events  Insight: demonstrates awareness of situation  Judgment: behavior is adequate/appropriate to circumstances      Laboratory Data:    Alcohol Biomarkers (MCV, AST, ALT, GGT, CDT):  MCV   Date Value Ref Range Status   05/09/2023 91 82 - 98 fL Final     AST   Date Value Ref Range Status   05/09/2023 40 10 - 40 U/L Final     ALT   Date Value Ref Range Status   05/09/2023 36 10 - 44 U/L Final       Alcohol  Screens (Blood, Urine):    No results found for: ALCOHOLMEDIC    No results found for: PCDSOALCOHOL      Urine Drug Screens (Benzodiazepines, Barbiturates, Methadone, Opiates, Cocaine, Amphetamines, THC, PCP):    No results found for: PCDSOBENZOD  No results found for: BARBITURATES  No results found for: PCDSCOMETHA  No results found for: OPIATESCREEN  No results found for: COCAINEMETAB  No results found for: AMPHETAMINES  No results found for: MARIJUANATHC  No results found for: PCDSOPHENCYN      Significant Labs: All pertinent labs within the past 24 hours have been reviewed.  Significant Imaging: I have reviewed all pertinent imaging results/findings within the past 24 hours.    Scheduled Meds:    Psychotherapeutics (From admission, onward)      None            PRN Meds:      ASSESSMENT      SUICIDE RISK ASSESSMET:    Protective factors: intact reality testing, intact support system, no recent attempts, and no substance use disorder  Risks factors: recent SI as reflected in chart, , male, age 15-25 or 59+ y/o, single, access to firearm, and chronic illness     Out of concern for safety based on my evaluation, would recommend to continue PEC and observation.     RECOMMENDATION(S)      Scheduled Medication(s):  Defer to ER MD / inpatient psych.     PRN Medication(s):  Defer to ER MD / inpatient psych.     Legal Status:  Continue PEC for risk of danger to self. Once medically cleared by ED/Primary MD, recommend seeking placement in an acute psychiatric facility.    Case discussed with psychiatry attending: Jonathan Malik MD.  Recommendations discussed with EM attending Dr. Sal.     Jasmeet Stewart MD  [facility] PGY3  05/26/2023 3:09 PM

## 2023-05-26 NOTE — ED NOTES
Pt daughter Karin updated on pt status and situation - daughter notified to let RN know of any other questions or concerns she has while pt remains in our care. Pt stable.

## 2023-05-26 NOTE — ED PROVIDER NOTES
"Emergency Department Encounter  Provider Note    Jarrett Charlton Jr.  [id number]  2023    Evaluation:    History:     Chief Complaint   Patient presents with    Psychiatric Evaluation     Pt from [facility] center. Presents to ED w/ security and PEC. States he had a "slip of the tongue and wish he never said it." Won't state what he said to nurse in [facility] center. Denies SI/HI. States he is frustrated and just wants to go.       History of Present Illness:  Jarrett Charlton Jr. is a 73 y.o. male who has a past medical history of Basal cell carcinoma, BPH (benign prostatic hyperplasia), Carotid stenosis, Chronic kidney disease, Claudication, Coronary artery disease, DDD (degenerative disc disease) (10/21/2013), Diabetes mellitus with renal complications, Disc disease, degenerative, cervical, Encounter for blood transfusion, GERD (gastroesophageal reflux disease), Gout, chronic, History of ulcerative colitis, HLD (hyperlipidemia), HTN (hypertension), Ileostomy in place (), RBBB, Squamous cell carcinoma (2018), Squamous cell carcinoma (2018), and Ventricular tachycardia.    The patient presents to the ED for psychiatric evaluation.   Patient presents accompanied by security.  He was in Nephrology Clinic earlier today.  Per report, he stated that he had been having suicidal thoughts because he does not want to be on dialysis.    On arrival, patient adamantly denies any suicidal or homicidal ideation.  He denies any prior suicide attempts.  He states his granddaughter lives with him and he has multiple family members to take care of.    He states "I must have said the wrong thing" and refuses to state exactly what he said.  He does admit his wife  3 years ago kidney failure, and he admits to feeling sad that he was told his kidney function is declining.     Past Medical History:   Diagnosis Date    Basal cell carcinoma     BPH (benign prostatic hyperplasia)     s/p TURP    Carotid stenosis     " Chronic kidney disease     Claudication     Coronary artery disease     DDD (degenerative disc disease) 10/21/2013    Diabetes mellitus with renal complications     Disc disease, degenerative, cervical     Encounter for blood transfusion     GERD (gastroesophageal reflux disease)     Gout, chronic     History of ulcerative colitis     s/p colectomy and ileostomy    HLD (hyperlipidemia)     HTN (hypertension)     Ileostomy in place 1982    RBBB     Squamous cell carcinoma 03/08/2018    Left superior helix near insertion    Squamous cell carcinoma 04/12/2018    Left forearm x 5    Ventricular tachycardia      Past Surgical History:   Procedure Laterality Date    cardiac stents      CATARACT EXTRACTION Bilateral     CERVICAL FUSION      CHOLECYSTECTOMY N/A 2/14/2023    Procedure: CHOLECYSTECTOMY;  Surgeon: Mike Joyce MD;  Location: [facility];  Service: General;  Laterality: N/A;  very high probabilty of converison to open    colectomy and ileostomy  1985    ENDOSCOPIC ULTRASOUND OF UPPER GASTROINTESTINAL TRACT N/A 2/10/2023    Procedure: ULTRASOUND, UPPER GI TRACT, ENDOSCOPIC;  Surgeon: Poli Fuller MD;  Location: [facility];  Service: Endoscopy;  Laterality: N/A;    ERCP N/A 2/10/2023    Procedure: ERCP (ENDOSCOPIC RETROGRADE CHOLANGIOPANCREATOGRAPHY);  Surgeon: Poli Fuller MD;  Location: [facility] ENDO;  Service: Endoscopy;  Laterality: N/A;    EXCISION OF PAROTID GLAND Left 12/18/2020    Procedure: EXCISION, PAROTID GLAND;  Surgeon: Michael Pinzon MD;  Location: [address];  Service: ENT;  Laterality: Left;    INSERTION OF IMPLANTABLE LOOP RECORDER  06/07/2021    INSERTION OF IMPLANTABLE LOOP RECORDER Left 6/7/2021    Procedure: INSERTION, IMPLANTABLE LOOP RECORDER;  Surgeon: Romario Dao MD;  Location: [facility] CATH LAB;  Service: Cardiology;  Laterality: Left;    LEFT HEART CATHETERIZATION Right 4/15/2021    Procedure: CATHETERIZATION, HEART, LEFT;  Surgeon: Marcio Jones MD;  Location: [facility] CATH  LAB;  Service: Cardiology;  Laterality: Right;    LYSIS OF ADHESIONS N/A 11/9/2020    Procedure: LYSIS, ADHESIONS,  ERAS low;  Surgeon: CED Haley MD;  Location: [facility] OR 2ND FLR;  Service: Colon and Rectal;  Laterality: N/A;    LYSIS OF ADHESIONS N/A 2/14/2023    Procedure: LYSIS, ADHESIONS;  Surgeon: Mike Joyce MD;  Location: [facility];  Service: General;  Laterality: N/A;    POUCHOSCOPY N/A 4/6/2022    Procedure: ENDOSCOPY, POUCH, SMALL INTESTINE, DIAGNOSTIC;  Surgeon: Dieter Juarez MD;  Location: [facility] ENDO (2ND FLR);  Service: Endoscopy;  Laterality: N/A;    REPAIR, HERNIA, PARASTOMAL N/A 11/9/2020    Procedure: REPAIR, HERNIA, PARASTOMAL;  Surgeon: CED Haley MD;  Location: [facility] OR 2ND FLR;  Service: Colon and Rectal;  Laterality: N/A;    TRANSURETHRAL RESECTION OF PROSTATE (TURP) WITHOUT USE OF LASER N/A 1/23/2019    Procedure: TURP, WITHOUT USING LASER BIPOLAR;  Surgeon: Catarino Mota MD;  Location: [facility] OR 1ST FLR;  Service: Urology;  Laterality: N/A;  1.5 HOURS     Family History   Problem Relation Age of Onset    Cancer Father     Diabetes Father     Dementia Mother     Melanoma Neg Hx     Hypertension Neg Hx     Arthritis Neg Hx      Social History     Socioeconomic History    Marital status:     Number of children: 1   Occupational History    Occupation:  x 44 years     Comment: Retired    Occupation: [place]    Tobacco Use    Smoking status: Never    Smokeless tobacco: Never   Substance and Sexual Activity    Alcohol use: No    Drug use: No    Sexual activity: Not Currently     Partners: Female     Social Determinants of Health     Financial Resource Strain: Low Risk     Difficulty of Paying Living Expenses: Not hard at all   Food Insecurity: No Food Insecurity    Worried About Running Out of Food in the Last Year: Never true    Ran Out of Food in the Last Year: Never true   Transportation Needs: No Transportation Needs    Lack of  Transportation (Medical): No    Lack of Transportation (Non-Medical): No   Physical Activity: Inactive    Days of Exercise per Week: 0 days    Minutes of Exercise per Session: 0 min   Stress: Stress Concern Present    Feeling of Stress : To some extent   Social Connections: Moderately Isolated    Frequency of Communication with Friends and Family: More than three times a week    Frequency of Social Gatherings with Friends and Family: More than three times a week    Attends Adventism Services: Never    Active Member of Clubs or Organizations: Yes    Attends Club or Organization Meetings: Never    Marital Status:    Housing Stability: Low Risk     Unable to Pay for Housing in the Last Year: No    Number of Places Lived in the Last Year: 1    Unstable Housing in the Last Year: No     Review of patient's allergies indicates:   Allergen Reactions    Crestor [rosuvastatin]     Lipitor [atorvastatin]        Review of Systems   Psychiatric/Behavioral:  Positive for behavioral problems.      Physical Exam:     Initial Vitals [05/26/23 1145]   BP Pulse Resp Temp SpO2   (!) 178/99 78 18 98.2 °F (36.8 °C) 98 %      MAP       --         Physical Exam    Nursing note and vitals reviewed.  Constitutional: He appears well-developed and well-nourished. He is not diaphoretic. No distress.   HENT:   Head: Normocephalic and atraumatic.   Mouth/Throat: Oropharynx is clear and moist.   Eyes: EOM are normal. Pupils are equal, round, and reactive to light.   Neck: No tracheal deviation present.   Cardiovascular:  Normal rate, regular rhythm, normal heart sounds and intact distal pulses.           Pulmonary/Chest: Breath sounds normal. No stridor. No respiratory distress.   Abdominal: Abdomen is soft. He exhibits no distension and no mass. There is no abdominal tenderness.   Musculoskeletal:         General: No edema. Normal range of motion.     Neurological: He is alert and oriented to person, place, and time. No cranial nerve  deficit or sensory deficit.   Skin: Skin is warm and dry. Capillary refill takes less than 2 seconds. No rash noted.   Psychiatric: He has a normal mood and affect. His speech is normal and behavior is normal. Judgment and thought content normal. He is not actively hallucinating. Cognition and memory are normal.   Calm and cooperative.  He is attentive.       ED Course:   Procedures    Medical Decision Making:    History Acquisition:   Additional historians utilized:  Discussed patient with Nephrology NP, who states patient made conditional suicidal statement saying that if his kidney function gets worse he may think of killing himself. She states he has been depressed since his wife passed away 3 years ago.     Prior medical records were reviewed:   Nephrology visit earlier today for f/u CKD  PCP visit 5/10 for diarrhea  Cards visit 4/24 for f/u HTN    The patient's list of active medical problems, social history, medications, and allergies as documented has been reviewed.     Differential Diagnoses:   Based on available information and initial assessment, Differential Diagnosis includes, but is not limited to:  Decompensated psychiatric disease (schizophrenia, bipolar disorder, major depression), excited delirium, medication noncompliance, substance abuse/withdrawal, intentional drug overdose, medication toxicity, APAP/ASA overdose, acute stress reaction, personality disorder, malingering, metabolic derangement        EKG:       Labs:     Labs Reviewed   CBC W/ AUTO DIFFERENTIAL - Abnormal; Notable for the following components:       Result Value    RBC 4.49 (*)     Hemoglobin 12.7 (*)     MCHC 31.0 (*)     Gran # (ANC) 7.9 (*)     Gran % 75.4 (*)     Lymph % 17.0 (*)     All other components within normal limits   COMPREHENSIVE METABOLIC PANEL - Abnormal; Notable for the following components:    CO2 22 (*)     Glucose 132 (*)     BUN 34 (*)     Creatinine 2.9 (*)     Total Protein 8.5 (*)     Alkaline Phosphatase  197 (*)     AST 43 (*)     eGFR 22.0 (*)     All other components within normal limits   URINALYSIS, REFLEX TO URINE CULTURE - Abnormal; Notable for the following components:    Protein, UA 1+ (*)     All other components within normal limits    Narrative:     Specimen Source->Urine   ACETAMINOPHEN LEVEL - Abnormal; Notable for the following components:    Acetaminophen (Tylenol), Serum <3.0 (*)     All other components within normal limits   URINALYSIS MICROSCOPIC - Abnormal; Notable for the following components:    WBC, UA 7 (*)     All other components within normal limits    Narrative:     Specimen Source->Urine   HIV 1 / 2 ANTIBODY    Narrative:     Release to patient->Immediate   HEPATITIS C ANTIBODY    Narrative:     Release to patient->Immediate   TSH   DRUG SCREEN PANEL, URINE EMERGENCY    Narrative:     Specimen Source->Urine   ALCOHOL,MEDICAL (ETHANOL)   POCT GLUCOSE     Independent review of the labs ordered include:   See ED course    Imaging:     Imaging Results    None            Additional Consideration:   Additional testing considered during clinical course: none    Social determinants of health considered during development of treatment plan include: none    Current co-morbidities considered which impacted clinical decision making: CKD, DM, HTN, HLD, BPH, GERD, CAD, SBO s/p ileostomy    Case discussed with additional provider: Psychiatry service on call for evaluation of passive SI    Medications   hydrALAZINE tablet 25 mg (25 mg Oral Given 5/26/23 1709)        ED Course as of 05/26/23 1738   Fri May 26, 2023   1231 Discussed patient with Psychiatry resident, who will evaluate at bedside.  [SS]   1433 Patient evaluated by Psychiatry, who feels patient needs to be continued under PEC while seeking inpatient psychiatric evaluation.  Labs added on. Patient informed of plan of care.  [SS]   1440 SpO2: 98 % [SS]   1440 Resp: 18 [SS]   1440 Pulse: 78 [SS]   1440 Temp: 98.2 °F (36.8 °C) [SS]   1440 BP(!):  178/99  Vitals reassuring  [SS]   1657 POCT glucose  Glucose normal  [SS]   1657 Ethanol  EtOH normal [SS]   1658 Acetaminophen level(!)  WNL [SS]   1658 Comprehensive metabolic panel(!)  Cr stable [SS]   1658 CBC auto differential(!)  Stable  [SS]   1658 Hepatitis C Antibody  WNL [SS]   1658 TSH  WNL [SS]   1658 HIV 1/2 Ag/Ab (4th Gen)  WNL [SS]   1658 Currently awaiting UA prior to medical clearance [SS]   1734 Urinalysis, Reflex to Urine Culture Urine, Clean Catch(!) [SS]   1734 Urinalysis Microscopic(!)  Not consistent with infection, multiple squamous cells likely indicative of contaminated sample.  [SS]   1735 Drug screen panel, emergency  Negative  [SS]   1735 Labs unremarkable. Patient medically stable for Psych transfer.  [SS]      ED Course User Index  [SS] Phan Sal MD       Medical Decision Making:   Initial Assessment:   74 yo M sent to ED by Nephrology physician after he verbalized passive SI in regard to his worsening kidney function.  Will consult Psych for evaluation.   Clinical Tests:   Lab Tests: Ordered and Reviewed  ED Management:  After complete evaluation, including thorough history and physical exam, the patient's symptoms are most likely due to acute psychiatric illness. There are no features of history or physical exam indicative of acute medical illness. Vital signs have been stable throughout ED course. The patient has no history of psychiatric illness, and is not currently on psychiatric medication. The patient currently endorses passive SI without plan.    Due to patient's presentation, history, and current condition, a PEC was completed for the safety of the patient and medical staff during evaluation and management.  One-to-one observation was initiated.     Labs were obtained, unremarkable.    The patient is currently medically cleared for psychiatric evaluation and transfer to inpatient psychiatric facility as necessary.      On re-evaluation, the patient's status has remained  stable.  At this time, I believe the patient should be transferred to Psych facility for further evaluation and management of suicidal ideation.    The patient and family were updated with test results, overall impression, and further plan of care. All questions were answered. The patient expressed understanding and agrees with the current plan.                 Clinical Impression:       ICD-10-CM ICD-9-CM   1. Depression, unspecified depression type  F32.A 311   2. Suicidal thoughts  R45.851 V62.84         Follow-up Information    None          ED Disposition Condition    Transfer to Psych Facility Stable               Phan Sal MD  05/26/23 7274

## 2023-05-26 NOTE — ED NOTES
"Jarrett Charlton Jr., a 73 y.o. male presents to the ED w/ complaint of being sent here from his outpatient kidney clinic (in [facility]) w PEC after stating that he "misspoke" to the nephrologist. Pt denies medical complaints and sts "I said something to her about being tired of all of these kidney issues and she mistook what I meant" - pt is actively denying all suicidal and homicidal ideation to this RN and denies any AVH - pt denies any want to hurt himself or others - pt is alert and oriented x4, skin wnl, rr even/unlabored and in NAD. Sitter/psych tech present at pt bedside for 1:1 monitoring and pt belongings and clothing removed and inventoried - pt presents in psych safe room w harmful objects removed from pt access. Pt denies needs at this time and BGL checked per pt stating he is a diabetic. Pt is calm and cooperative at this time and will follow up pending psych consult.     Triage note:  Chief Complaint   Patient presents with    Psychiatric Evaluation     Pt from infusion center. Presents to ED w/ security and PEC. States he had a "slip of the tongue and wish he never said it." Won't state what he said to nurse in infusion center. Denies SI/HI. States he is frustrated and just wants to go.     Review of patient's allergies indicates:   Allergen Reactions    Crestor [rosuvastatin]     Lipitor [atorvastatin]      Past Medical History:   Diagnosis Date    Basal cell carcinoma     BPH (benign prostatic hyperplasia)     s/p TURP    Carotid stenosis     Chronic kidney disease     Claudication     Coronary artery disease     DDD (degenerative disc disease) 10/21/2013    Diabetes mellitus with renal complications     Disc disease, degenerative, cervical     Encounter for blood transfusion     GERD (gastroesophageal reflux disease)     Gout, chronic     History of ulcerative colitis     s/p colectomy and ileostomy    HLD (hyperlipidemia)     HTN (hypertension)     Ileostomy in place 1982    RBBB     Squamous cell " carcinoma 03/08/2018    Left superior helix near insertion    Squamous cell carcinoma 04/12/2018    Left forearm x 5    Ventricular tachycardia

## 2023-05-26 NOTE — PROGRESS NOTES
Patient received 1 liter Normal Saline x 1hr, pt tolerated well & left in NAD, next appt made

## 2023-05-26 NOTE — ED NOTES
Pt provided dinner tray, provided sandwich, juice, and water by this RN - pt offered to help readjust in bed and get more comfortable but pt declined and remains sitting on side of bed. Pt remains calm and cooperative w this RN - stable, AOX4 and notified to let RN or sitter know of any additional needs - will continue to monitor.

## 2023-05-27 NOTE — ED NOTES
Pt report given to Bianka FERRELL at [facility]. Pt transport to be placed at this time.

## 2023-05-27 NOTE — ED NOTES
Pt transport present to take pt to [facility] - pt stable, skin wnl, rr even/unlabored - EMS provided face sheet, transfer form, PEC, and pt report. Pt belongings also given to transport team at transfer. Stable and transferred via stretcher.

## 2023-05-29 PROBLEM — R45.851 SUICIDAL IDEATION: Status: ACTIVE | Noted: 2023-05-29

## 2023-05-31 ENCOUNTER — OFFICE VISIT (OUTPATIENT)
Dept: CARDIOLOGY | Facility: CLINIC | Age: 73
End: 2023-05-31
Payer: MEDICARE

## 2023-05-31 ENCOUNTER — INFUSION (OUTPATIENT)
Dept: INFECTIOUS DISEASES | Facility: HOSPITAL | Age: 73
End: 2023-05-31
Payer: MEDICARE

## 2023-05-31 VITALS
SYSTOLIC BLOOD PRESSURE: 163 MMHG | RESPIRATION RATE: 18 BRPM | DIASTOLIC BLOOD PRESSURE: 79 MMHG | OXYGEN SATURATION: 97 % | WEIGHT: 211.56 LBS | TEMPERATURE: 98 F | HEART RATE: 66 BPM | BODY MASS INDEX: 27.16 KG/M2

## 2023-05-31 VITALS
HEIGHT: 74 IN | SYSTOLIC BLOOD PRESSURE: 120 MMHG | DIASTOLIC BLOOD PRESSURE: 73 MMHG | HEART RATE: 62 BPM | OXYGEN SATURATION: 96 % | WEIGHT: 213.38 LBS | BODY MASS INDEX: 27.39 KG/M2

## 2023-05-31 DIAGNOSIS — E11.22 TYPE 2 DIABETES MELLITUS WITH STAGE 3B CHRONIC KIDNEY DISEASE, WITHOUT LONG-TERM CURRENT USE OF INSULIN: ICD-10-CM

## 2023-05-31 DIAGNOSIS — I25.10 CORONARY ARTERY DISEASE INVOLVING NATIVE CORONARY ARTERY OF NATIVE HEART WITHOUT ANGINA PECTORIS: Primary | ICD-10-CM

## 2023-05-31 DIAGNOSIS — E86.0 DEHYDRATION: Primary | ICD-10-CM

## 2023-05-31 DIAGNOSIS — Z93.3 COLOSTOMY PRESENT: ICD-10-CM

## 2023-05-31 DIAGNOSIS — N18.32 TYPE 2 DIABETES MELLITUS WITH STAGE 3B CHRONIC KIDNEY DISEASE, WITHOUT LONG-TERM CURRENT USE OF INSULIN: ICD-10-CM

## 2023-05-31 PROCEDURE — 3008F PR BODY MASS INDEX (BMI) DOCUMENTED: ICD-10-PCS | Mod: CPTII,S$GLB,, | Performed by: INTERNAL MEDICINE

## 2023-05-31 PROCEDURE — 1126F PR PAIN SEVERITY QUANTIFIED, NO PAIN PRESENT: ICD-10-PCS | Mod: CPTII,S$GLB,, | Performed by: INTERNAL MEDICINE

## 2023-05-31 PROCEDURE — 96360 HYDRATION IV INFUSION INIT: CPT

## 2023-05-31 PROCEDURE — 3074F SYST BP LT 130 MM HG: CPT | Mod: CPTII,S$GLB,, | Performed by: INTERNAL MEDICINE

## 2023-05-31 PROCEDURE — 3044F HG A1C LEVEL LT 7.0%: CPT | Mod: CPTII,S$GLB,, | Performed by: INTERNAL MEDICINE

## 2023-05-31 PROCEDURE — 99215 OFFICE O/P EST HI 40 MIN: CPT | Mod: S$GLB,,, | Performed by: INTERNAL MEDICINE

## 2023-05-31 PROCEDURE — 3066F NEPHROPATHY DOC TX: CPT | Mod: CPTII,S$GLB,, | Performed by: INTERNAL MEDICINE

## 2023-05-31 PROCEDURE — 1159F PR MEDICATION LIST DOCUMENTED IN MEDICAL RECORD: ICD-10-PCS | Mod: CPTII,S$GLB,, | Performed by: INTERNAL MEDICINE

## 2023-05-31 PROCEDURE — 3044F PR MOST RECENT HEMOGLOBIN A1C LEVEL <7.0%: ICD-10-PCS | Mod: CPTII,S$GLB,, | Performed by: INTERNAL MEDICINE

## 2023-05-31 PROCEDURE — 3066F PR DOCUMENTATION OF TREATMENT FOR NEPHROPATHY: ICD-10-PCS | Mod: CPTII,S$GLB,, | Performed by: INTERNAL MEDICINE

## 2023-05-31 PROCEDURE — 1160F PR REVIEW ALL MEDS BY PRESCRIBER/CLIN PHARMACIST DOCUMENTED: ICD-10-PCS | Mod: CPTII,S$GLB,, | Performed by: INTERNAL MEDICINE

## 2023-05-31 PROCEDURE — 1126F AMNT PAIN NOTED NONE PRSNT: CPT | Mod: CPTII,S$GLB,, | Performed by: INTERNAL MEDICINE

## 2023-05-31 PROCEDURE — 1111F DSCHRG MED/CURRENT MED MERGE: CPT | Mod: CPTII,S$GLB,, | Performed by: INTERNAL MEDICINE

## 2023-05-31 PROCEDURE — 99999 PR PBB SHADOW E&M-EST. PATIENT-LVL IV: ICD-10-PCS | Mod: PBBFAC,,, | Performed by: INTERNAL MEDICINE

## 2023-05-31 PROCEDURE — 99215 PR OFFICE/OUTPT VISIT, EST, LEVL V, 40-54 MIN: ICD-10-PCS | Mod: S$GLB,,, | Performed by: INTERNAL MEDICINE

## 2023-05-31 PROCEDURE — 3078F PR MOST RECENT DIASTOLIC BLOOD PRESSURE < 80 MM HG: ICD-10-PCS | Mod: CPTII,S$GLB,, | Performed by: INTERNAL MEDICINE

## 2023-05-31 PROCEDURE — 1159F MED LIST DOCD IN RCRD: CPT | Mod: CPTII,S$GLB,, | Performed by: INTERNAL MEDICINE

## 2023-05-31 PROCEDURE — 1111F PR DISCHARGE MEDS RECONCILED W/ CURRENT OUTPATIENT MED LIST: ICD-10-PCS | Mod: CPTII,S$GLB,, | Performed by: INTERNAL MEDICINE

## 2023-05-31 PROCEDURE — 25000003 PHARM REV CODE 250: Performed by: NURSE PRACTITIONER

## 2023-05-31 PROCEDURE — 99999 PR PBB SHADOW E&M-EST. PATIENT-LVL IV: CPT | Mod: PBBFAC,,, | Performed by: INTERNAL MEDICINE

## 2023-05-31 PROCEDURE — 3008F BODY MASS INDEX DOCD: CPT | Mod: CPTII,S$GLB,, | Performed by: INTERNAL MEDICINE

## 2023-05-31 PROCEDURE — 1160F RVW MEDS BY RX/DR IN RCRD: CPT | Mod: CPTII,S$GLB,, | Performed by: INTERNAL MEDICINE

## 2023-05-31 PROCEDURE — 3078F DIAST BP <80 MM HG: CPT | Mod: CPTII,S$GLB,, | Performed by: INTERNAL MEDICINE

## 2023-05-31 PROCEDURE — 3074F PR MOST RECENT SYSTOLIC BLOOD PRESSURE < 130 MM HG: ICD-10-PCS | Mod: CPTII,S$GLB,, | Performed by: INTERNAL MEDICINE

## 2023-05-31 RX ORDER — SODIUM CHLORIDE 0.9 % (FLUSH) 0.9 %
10 SYRINGE (ML) INJECTION
Status: DISCONTINUED | OUTPATIENT
Start: 2023-05-31 | End: 2023-05-31 | Stop reason: HOSPADM

## 2023-05-31 RX ORDER — HEPARIN 100 UNIT/ML
500 SYRINGE INTRAVENOUS
Status: DISCONTINUED | OUTPATIENT
Start: 2023-05-31 | End: 2023-05-31 | Stop reason: HOSPADM

## 2023-05-31 RX ORDER — HEPARIN 100 UNIT/ML
500 SYRINGE INTRAVENOUS
Status: CANCELLED | OUTPATIENT
Start: 2023-06-02

## 2023-05-31 RX ORDER — PRAVASTATIN SODIUM 40 MG/1
40 TABLET ORAL DAILY
Qty: 90 TABLET | Refills: 0 | Status: SHIPPED | OUTPATIENT
Start: 2023-05-31 | End: 2023-07-26

## 2023-05-31 RX ORDER — SODIUM CHLORIDE 0.9 % (FLUSH) 0.9 %
10 SYRINGE (ML) INJECTION
Status: CANCELLED | OUTPATIENT
Start: 2023-06-02

## 2023-05-31 RX ADMIN — SODIUM CHLORIDE 1000 ML: 9 INJECTION, SOLUTION INTRAVENOUS at 08:05

## 2023-05-31 NOTE — PROGRESS NOTES
" [facility] [address] [address]  Cardiology Clinic Note      Chief Complaint  Chief Complaint   Patient presents with    Follow-up     BP consistently low       HPI:    72-year-old male with a past medical history SBO, s/p colostomy, GERD, parotid mass s/p resection, BPH & prostate CA s/p TURP, CKD 4, pleural plaque, ("neurogenic") orthostatic hypotension, hypertension, hyperlipidemia, DM2, carotid stenosis not hemodynamically significant ultrasound 03/2023, ascending thoracic aorta 4.4 cm CT 2018, lower extremity arterial duplex 09/2022 no evidence of hemodynamically significant stenosis nonvisualization of the peroneal arteries prior SAKSHI 2019 normal, CAD status post PCI to mid LAD 2017 followed by cardiac catheterization 2021 patent stent nonobstructive disease prior MPI 2018 inferolateral ischemia, sustained ventricular tachycardia documented on discharge summary 4/2021 on amiodarone previous loop recorder implantation 2021 presumed link transmission showed several episodes of ventricular tachycardia with longest episode 36 beats also documented either atrial fibrillation RVR with aberrant conduction or AV radha reentry tachycardia (this was documented by an outside provider note scan 03/2021), echo 12/2022 normal EF mild LVH diastolic function indeterminate normal RV PASP 32+ CVP which could not be determined ascending aorta mildly dilated    Hospitalized with cholestatic jaundice s/p cholecystectomy with lysis of adhesions 2/14     Nephrology has IVF scheduled twice weekly secondary to losses from ostomy  Staying hydrated no NSAIDs     Neurology is following as well suspected autonomic dysfunction with orthostatic component      Last note from outside cardiology which states patient has pAF in note scan 2/3/2023    Seen by electrophysiology amiodarone was held and suggested loop recorder.  AF episodes seen on previous monitoring were most consistent with artifact.    Patient currently is managing his labile " hypertension with p.r.n. hydralazine but he is still taking Toprol for other indications    Previously checked CMP with markedly elevated LFTs sent to ER and held statin already off amio  LFTs have come down it appears he was discharged from the ER directly  Statin held  He is also had to go to the emergency room for intravenous fluids  PCP place patient on oxybutynin for high ileostomy output  Recently seen by Nephrology continuing IVF twice weekly labs ordered  PEC'd for suicidal ideation    LFTs have improved aside from alkaline phosphatase    Medications  Current Outpatient Medications   Medication Sig Dispense Refill    allopurinoL (ZYLOPRIM) 300 MG tablet Take 1.5 tablets (450 mg total) by mouth once daily. 135 tablet 3    aspirin (ECOTRIN) 81 MG EC tablet Take 1 tablet (81 mg total) by mouth once daily. 30 tablet 1    brimonidine 0.2% (ALPHAGAN) 0.2 % Drop Place 1 drop into both eyes 2 (two) times daily. 20 mL 0    calcium-vitamin D3 (OS-DAKOTA 500 + D3) 500 mg-5 mcg (200 unit) per tablet Take 1 tablet by mouth once daily. 30 tablet 1    cholestyramine-aspartame (QUESTRAN LIGHT) 4 gram PwPk Take 1 packet (4 g total) by mouth 3 (three) times daily with meals. 270 packet 1    FIBER, CALCIUM POLYCARBOPHIL, 625 mg tablet Take 3 tablets by mouth before dinner.      fludrocortisone (FLORINEF) 0.1 mg Tab Take 1 tablet (100 mcg total) by mouth once daily. 180 tablet 0    gabapentin (NEURONTIN) 100 MG capsule Take 2 capsules (200 mg total) by mouth 3 (three) times daily. 180 capsule 1    glimepiride (AMARYL) 4 MG tablet Take 1 tablet (4 mg total) by mouth 2 (two) times daily before meals. 90 tablet 1    magnesium oxide 400 mg magnesium Tab 1 po qdaily 30 tablet 2    metoprolol succinate (TOPROL-XL) 25 MG 24 hr tablet Take 1 tablet (25 mg total) by mouth once daily. 90 tablet 1    midodrine (PROAMATINE) 10 MG tablet Take 1 tablet (10 mg total) by mouth daily as needed (Hypotension). 30 tablet 0    oxybutynin (DITROPAN-XL)  10 MG 24 hr tablet Take 1 tablet (10 mg total) by mouth once daily. 30 tablet 1    pantoprazole (PROTONIX) 40 MG tablet Take 1 tablet (40 mg total) by mouth once daily. 90 tablet 0    ACCU-CHEK PAUL CONTROL SOLN Soln 1 drop by NOT APPLICABLE route once daily.      ACCU-CHEK SOFTCLIX LANCETS Misc Accucheck tid 200 each 1    BD ALCOHOL SWABS PadM Apply 1 each topically 5 (five) times daily. 300 each 2    blood sugar diagnostic (ONETOUCH ULTRA TEST) Str USE ONE STRIP TO CHECK GLUCOSE EVERY  each 11    blood-glucose meter (ACCU-CHEK GUIDE GLUCOSE METER) [facility] BID 1 each 0    cetirizine (ZYRTEC) 5 MG tablet Take 1 tablet (5 mg total) by mouth once daily. 30 tablet 1    LIDOcaine (LIDODERM) 5 % Place 1 patch onto the skin as needed. Remove & Discard patch within 12 hours or as directed by MD 30 patch 1     No current facility-administered medications for this visit.     Facility-Administered Medications Ordered in Other Visits   Medication Dose Route Frequency Provider Last Rate Last Admin    sodium chloride 0.9% in 1,000 mL infusion   Intravenous Continuous Paper Order            History  Past Medical History:   Diagnosis Date    Basal cell carcinoma     BPH (benign prostatic hyperplasia)     s/p TURP    Carotid stenosis     Chronic kidney disease     Claudication     Coronary artery disease     DDD (degenerative disc disease) 10/21/2013    Diabetes mellitus with renal complications     Disc disease, degenerative, cervical     Encounter for blood transfusion     GERD (gastroesophageal reflux disease)     Gout, chronic     History of ulcerative colitis     s/p colectomy and ileostomy    HLD (hyperlipidemia)     HTN (hypertension)     Ileostomy in place 1982    RBBB     Squamous cell carcinoma 03/08/2018    Left superior helix near insertion    Squamous cell carcinoma 04/12/2018    Left forearm x 5    Ventricular tachycardia      Past Surgical History:   Procedure Laterality Date    cardiac stents       CATARACT EXTRACTION Bilateral     CERVICAL FUSION      CHOLECYSTECTOMY N/A 2/14/2023    Procedure: CHOLECYSTECTOMY;  Surgeon: Mike Joyce MD;  Location: [facility] OR;  Service: General;  Laterality: N/A;  very high probabilty of converison to open    colectomy and ileostomy  1985    ENDOSCOPIC ULTRASOUND OF UPPER GASTROINTESTINAL TRACT N/A 2/10/2023    Procedure: ULTRASOUND, UPPER GI TRACT, ENDOSCOPIC;  Surgeon: Poli Fuller MD;  Location: [facility] ENDO;  Service: Endoscopy;  Laterality: N/A;    ERCP N/A 2/10/2023    Procedure: ERCP (ENDOSCOPIC RETROGRADE CHOLANGIOPANCREATOGRAPHY);  Surgeon: Poli Fuller MD;  Location: [facility] ENDO;  Service: Endoscopy;  Laterality: N/A;    EXCISION OF PAROTID GLAND Left 12/18/2020    Procedure: EXCISION, PAROTID GLAND;  Surgeon: Michael Pinzon MD;  Location: [facility] 2ND FLR;  Service: ENT;  Laterality: Left;    INSERTION OF IMPLANTABLE LOOP RECORDER  06/07/2021    INSERTION OF IMPLANTABLE LOOP RECORDER Left 6/7/2021    Procedure: INSERTION, IMPLANTABLE LOOP RECORDER;  Surgeon: Romario Dao MD;  Location: [facility] CATH LAB;  Service: Cardiology;  Laterality: Left;    LEFT HEART CATHETERIZATION Right 4/15/2021    Procedure: CATHETERIZATION, HEART, LEFT;  Surgeon: Marcio Jones MD;  Location: [facility] CATH LAB;  Service: Cardiology;  Laterality: Right;    LYSIS OF ADHESIONS N/A 11/9/2020    Procedure: LYSIS, ADHESIONS,  ERAS low;  Surgeon: CED Haley MD;  Location: [facility] OR 2ND FLR;  Service: Colon and Rectal;  Laterality: N/A;    LYSIS OF ADHESIONS N/A 2/14/2023    Procedure: LYSIS, ADHESIONS;  Surgeon: Mike Joyce MD;  Location: [facility] OR;  Service: General;  Laterality: N/A;    POUCHOSCOPY N/A 4/6/2022    Procedure: ENDOSCOPY, POUCH, SMALL INTESTINE, DIAGNOSTIC;  Surgeon: Dieter Juarez MD;  Location: [facility] (2ND FLR);  Service: Endoscopy;  Laterality: N/A;    REPAIR, HERNIA, PARASTOMAL N/A 11/9/2020    Procedure: REPAIR, HERNIA, PARASTOMAL;  Surgeon: CED  Homero Haley MD;  Location: [facility] OR 2ND [facility];  Service: Colon and Rectal;  Laterality: N/A;    TRANSURETHRAL RESECTION OF PROSTATE (TURP) WITHOUT USE OF LASER N/A 1/23/2019    Procedure: TURP, WITHOUT USING LASER BIPOLAR;  Surgeon: Catarino Mota MD;  Location: [facility] OR 1ST FLR;  Service: Urology;  Laterality: N/A;  1.5 HOURS     Social History     Socioeconomic History    Marital status:     Number of children: 1   Occupational History    Occupation:  x 44 years     Comment: Retired    Occupation: [place]    Tobacco Use    Smoking status: Never    Smokeless tobacco: Never   Substance and Sexual Activity    Alcohol use: No    Drug use: No    Sexual activity: Not Currently     Partners: Female     Social Determinants of Health     Financial Resource Strain: Unknown    Difficulty of Paying Living Expenses: Patient refused   Food Insecurity: Unknown    Worried About Running Out of Food in the Last Year: Patient refused    Ran Out of Food in the Last Year: Patient refused   Transportation Needs: No Transportation Needs    Lack of Transportation (Medical): No    Lack of Transportation (Non-Medical): No   Physical Activity: Sufficiently Active    Days of Exercise per Week: 7 days    Minutes of Exercise per Session: 60 min   Stress: Unknown    Feeling of Stress : Patient refused   Social Connections: Unknown    Frequency of Communication with Friends and Family: Patient refused    Frequency of Social Gatherings with Friends and Family: Patient refused    Attends Zoroastrian Services: Patient refused    Active Member of Clubs or Organizations: Patient refused    Attends Club or Organization Meetings: Patient refused    Marital Status:    Housing Stability: Unknown    Unable to Pay for Housing in the Last Year: Patient refused    Number of Places Lived in the Last Year: 1    Unstable Housing in the Last Year: Patient refused     Family History   Problem Relation Age of Onset     Cancer Father     Diabetes Father     Dementia Mother     Melanoma Neg Hx     Hypertension Neg Hx     Arthritis Neg Hx         Allergies  Review of patient's allergies indicates:   Allergen Reactions    Crestor [rosuvastatin]     Lipitor [atorvastatin]        Review of Systems   Review of Systems   Constitutional: Negative for fever.   HENT:  Negative for nosebleeds.    Eyes:  Negative for visual disturbance.   Cardiovascular:  Negative for chest pain, claudication, dyspnea on exertion, palpitations and syncope.   Respiratory:  Negative for cough, hemoptysis and wheezing.    Endocrine: Negative for cold intolerance, heat intolerance, polyphagia and polyuria.   Hematologic/Lymphatic: Negative for bleeding problem.   Skin:  Negative for rash.   Musculoskeletal:  Negative for myalgias.   Gastrointestinal:  Negative for hematemesis, hematochezia, nausea and vomiting.   Genitourinary:  Negative for dysuria.   Neurological:  Negative for focal weakness and sensory change.   Psychiatric/Behavioral:  Negative for altered mental status.      Physical Exam  Vitals:    05/31/23 1458   BP: 120/73   Pulse: 62     Wt Readings from Last 1 Encounters:   05/31/23 96.8 kg (213 lb 6.5 oz)     Physical Exam  HENT:      Head: Normocephalic and atraumatic.      Mouth/Throat:      Mouth: Mucous membranes are moist.   Eyes:      Extraocular Movements: Extraocular movements intact.      Pupils: Pupils are equal, round, and reactive to light.   Neck:      Vascular: No carotid bruit or JVD.   Cardiovascular:      Rate and Rhythm: Normal rate and regular rhythm.      Pulses: Normal pulses and intact distal pulses.      Heart sounds: Normal heart sounds. No murmur heard.    No friction rub. No gallop.   Pulmonary:      Effort: Pulmonary effort is normal.      Breath sounds: Normal breath sounds.   Abdominal:      Tenderness: There is no abdominal tenderness. There is no guarding or rebound.   Musculoskeletal:      Right lower leg: No edema.       Left lower leg: No edema.   Skin:     General: Skin is warm and dry.      Capillary Refill: Capillary refill takes less than 2 seconds.   Neurological:      General: No focal deficit present.      Mental Status: He is alert and oriented to person, place, and time.   Psychiatric:         Mood and Affect: Mood normal.       Labs  Admission on 05/26/2023, Discharged on 05/26/2023   Component Date Value Ref Range Status    HIV 1/2 Ag/Ab 05/26/2023 Non-reactive  Non-reactive Final    Hepatitis C Ab 05/26/2023 Non-reactive  Non-reactive Final    POCT Glucose 05/26/2023 98  70 - 110 mg/dL Final    WBC 05/26/2023 10.52  3.90 - 12.70 K/uL Final    RBC 05/26/2023 4.49 (L)  4.60 - 6.20 M/uL Final    Hemoglobin 05/26/2023 12.7 (L)  14.0 - 18.0 g/dL Final    Hematocrit 05/26/2023 41.0  40.0 - 54.0 % Final    MCV 05/26/2023 91  82 - 98 fL Final    MCH 05/26/2023 28.3  27.0 - 31.0 pg Final    MCHC 05/26/2023 31.0 (L)  32.0 - 36.0 g/dL Final    RDW 05/26/2023 14.4  11.5 - 14.5 % Final    Platelets 05/26/2023 240  150 - 450 K/uL Final    MPV 05/26/2023 10.3  9.2 - 12.9 fL Final    Immature Granulocytes 05/26/2023 0.4  0.0 - 0.5 % Final    Gran # (ANC) 05/26/2023 7.9 (H)  1.8 - 7.7 K/uL Final    Immature Grans (Abs) 05/26/2023 0.04  0.00 - 0.04 K/uL Final    Comment: Mild elevation in immature granulocytes is non specific and   can be seen in a variety of conditions including stress response,   acute inflammation, trauma and pregnancy. Correlation with other   laboratory and clinical findings is essential.      Lymph # 05/26/2023 1.8  1.0 - 4.8 K/uL Final    Mono # 05/26/2023 0.6  0.3 - 1.0 K/uL Final    Eos # 05/26/2023 0.1  0.0 - 0.5 K/uL Final    Baso # 05/26/2023 0.04  0.00 - 0.20 K/uL Final    nRBC 05/26/2023 0  0 /100 WBC Final    Gran % 05/26/2023 75.4 (H)  38.0 - 73.0 % Final    Lymph % 05/26/2023 17.0 (L)  18.0 - 48.0 % Final    Mono % 05/26/2023 5.5  4.0 - 15.0 % Final    Eosinophil % 05/26/2023 1.3  0.0 - 8.0 % Final     Basophil % 05/26/2023 0.4  0.0 - 1.9 % Final    Differential Method 05/26/2023 Automated   Final    Sodium 05/26/2023 140  136 - 145 mmol/L Final    Potassium 05/26/2023 5.0  3.5 - 5.1 mmol/L Final    Chloride 05/26/2023 103  95 - 110 mmol/L Final    CO2 05/26/2023 22 (L)  23 - 29 mmol/L Final    Glucose 05/26/2023 132 (H)  70 - 110 mg/dL Final    BUN 05/26/2023 34 (H)  8 - 23 mg/dL Final    Creatinine 05/26/2023 2.9 (H)  0.5 - 1.4 mg/dL Final    Calcium 05/26/2023 9.3  8.7 - 10.5 mg/dL Final    Total Protein 05/26/2023 8.5 (H)  6.0 - 8.4 g/dL Final    Albumin 05/26/2023 3.5  3.5 - 5.2 g/dL Final    Total Bilirubin 05/26/2023 0.6  0.1 - 1.0 mg/dL Final    Comment: For infants and newborns, interpretation of results should be based  on gestational age, weight and in agreement with clinical  observations.    Premature Infant recommended reference ranges:  Up to 24 hours.............<8.0 mg/dL  Up to 48 hours............<12.0 mg/dL  3-5 days..................<15.0 mg/dL  6-29 days.................<15.0 mg/dL      Alkaline Phosphatase 05/26/2023 197 (H)  55 - 135 U/L Final    AST 05/26/2023 43 (H)  10 - 40 U/L Final    *Result may be interfered by visible hemolysis    ALT 05/26/2023 34  10 - 44 U/L Final    Anion Gap 05/26/2023 15  8 - 16 mmol/L Final    eGFR 05/26/2023 22.0 (A)  >60 mL/min/1.73 m^2 Final    TSH 05/26/2023 1.827  0.400 - 4.000 uIU/mL Final    Specimen UA 05/26/2023 Urine, Clean Catch   Final    Color, UA 05/26/2023 Yellow  Yellow, Straw, Stacy Final    Appearance, UA 05/26/2023 Clear  Clear Final    pH, UA 05/26/2023 6.0  5.0 - 8.0 Final    Specific Gravity, UA 05/26/2023 1.015  1.005 - 1.030 Final    Protein, UA 05/26/2023 1+ (A)  Negative Final    Comment: Recommend a 24 hour urine protein or a urine   protein/creatinine ratio if globulin induced proteinuria is  clinically suspected.      Glucose, UA 05/26/2023 Negative  Negative Final    Ketones, UA 05/26/2023 Negative  Negative Final    Bilirubin  (UA) 05/26/2023 Negative  Negative Final    Occult Blood UA 05/26/2023 Negative  Negative Final    Nitrite, UA 05/26/2023 Negative  Negative Final    Leukocytes, UA 05/26/2023 Negative  Negative Final    Benzodiazepines 05/26/2023 Negative  Negative Final    Methadone metabolites 05/26/2023 Negative  Negative Final    Cocaine (Metab.) 05/26/2023 Negative  Negative Final    Opiate Scrn, Ur 05/26/2023 Negative  Negative Final    Barbiturate Screen, Ur 05/26/2023 Negative  Negative Final    Amphetamine Screen, Ur 05/26/2023 Negative  Negative Final    THC 05/26/2023 Negative  Negative Final    Phencyclidine 05/26/2023 Negative  Negative Final    Creatinine, Urine 05/26/2023 180.0  23.0 - 375.0 mg/dL Final    Toxicology Information 05/26/2023 SEE COMMENT   Final    Comment: This screen includes the following classes of drugs at the listed   cut-off:    Benzodiazepines 200 ng/ml  Methadone 300 ng/ml  Cocaine metabolite 300 ng/ml  Opiates 300 ng/ml  Barbiturates 200 ng/ml  Amphetamines 1000 ng/ml  Marijuana metabs (THC) 50 ng/ml  Phencyclidine (PCP) 25 ng/ml    This is a screening test. If results do not correlate with clinical   presentation, then a confirmatory send out test is advised.     This report is intended for use in clinical monitoring and management   of   patients. It is not intended for use in employment related drug   testing.      Alcohol, Serum 05/26/2023 <10  <10 mg/dL Final    Acetaminophen (Tylenol), Serum 05/26/2023 <3.0 (L)  10.0 - 20.0 ug/mL Final    Comment: Toxic Levels:  Adults (4 hr post-ingestion).........>150 ug/mL  Adults (12 hr post-ingestion)........>40 ug/mL  Peds (2 hr post-ingestion, liquid)...>225 ug/mL      RBC, UA 05/26/2023 0  0 - 4 /hpf Final    WBC, UA 05/26/2023 7 (H)  0 - 5 /hpf Final    Bacteria 05/26/2023 None  None-Occ /hpf Final    Squam Epithel, UA 05/26/2023 3  /hpf Final    Hyaline Casts, UA 05/26/2023 0  0-1/lpf /lpf Final    Microscopic Comment 05/26/2023 SEE COMMENT    Final    Comment: Other formed elements not mentioned in the report are not   present in the microscopic examination.      Admission on 05/09/2023, Discharged on 05/09/2023   Component Date Value Ref Range Status    WBC 05/09/2023 9.44  3.90 - 12.70 K/uL Final    RBC 05/09/2023 4.47 (L)  4.60 - 6.20 M/uL Final    Hemoglobin 05/09/2023 13.1 (L)  14.0 - 18.0 g/dL Final    Hematocrit 05/09/2023 40.5  40.0 - 54.0 % Final    MCV 05/09/2023 91  82 - 98 fL Final    MCH 05/09/2023 29.3  27.0 - 31.0 pg Final    MCHC 05/09/2023 32.3  32.0 - 36.0 g/dL Final    RDW 05/09/2023 14.5  11.5 - 14.5 % Final    Platelets 05/09/2023 230  150 - 450 K/uL Final    MPV 05/09/2023 9.9  9.2 - 12.9 fL Final    Immature Granulocytes 05/09/2023 0.3  0.0 - 0.5 % Final    Gran # (ANC) 05/09/2023 6.5  1.8 - 7.7 K/uL Final    Immature Grans (Abs) 05/09/2023 0.03  0.00 - 0.04 K/uL Final    Comment: Mild elevation in immature granulocytes is non specific and   can be seen in a variety of conditions including stress response,   acute inflammation, trauma and pregnancy. Correlation with other   laboratory and clinical findings is essential.      Lymph # 05/09/2023 2.1  1.0 - 4.8 K/uL Final    Mono # 05/09/2023 0.6  0.3 - 1.0 K/uL Final    Eos # 05/09/2023 0.2  0.0 - 0.5 K/uL Final    Baso # 05/09/2023 0.05  0.00 - 0.20 K/uL Final    nRBC 05/09/2023 0  0 /100 WBC Final    Gran % 05/09/2023 68.8  38.0 - 73.0 % Final    Lymph % 05/09/2023 22.6  18.0 - 48.0 % Final    Mono % 05/09/2023 5.8  4.0 - 15.0 % Final    Eosinophil % 05/09/2023 2.0  0.0 - 8.0 % Final    Basophil % 05/09/2023 0.5  0.0 - 1.9 % Final    Differential Method 05/09/2023 Automated   Final    Sodium 05/09/2023 142  136 - 145 mmol/L Final    Potassium 05/09/2023 4.1  3.5 - 5.1 mmol/L Final    Chloride 05/09/2023 109  95 - 110 mmol/L Final    CO2 05/09/2023 22 (L)  23 - 29 mmol/L Final    Glucose 05/09/2023 147 (H)  70 - 110 mg/dL Final    BUN 05/09/2023 51 (H)  8 - 23 mg/dL Final     Creatinine 05/09/2023 2.9 (H)  0.5 - 1.4 mg/dL Final    Calcium 05/09/2023 9.6  8.7 - 10.5 mg/dL Final    Total Protein 05/09/2023 7.6  6.0 - 8.4 g/dL Final    Albumin 05/09/2023 3.6  3.5 - 5.2 g/dL Final    Total Bilirubin 05/09/2023 0.5  0.1 - 1.0 mg/dL Final    Comment: For infants and newborns, interpretation of results should be based  on gestational age, weight and in agreement with clinical  observations.    Premature Infant recommended reference ranges:  Up to 24 hours.............<8.0 mg/dL  Up to 48 hours............<12.0 mg/dL  3-5 days..................<15.0 mg/dL  6-29 days.................<15.0 mg/dL      Alkaline Phosphatase 05/09/2023 154 (H)  55 - 135 U/L Final    AST 05/09/2023 40  10 - 40 U/L Final    ALT 05/09/2023 36  10 - 44 U/L Final    Anion Gap 05/09/2023 11  8 - 16 mmol/L Final    eGFR 05/09/2023 22.0 (A)  >60 mL/min/1.73 m^2 Final    Troponin I 05/09/2023 <0.006  0.000 - 0.026 ng/mL Final    Comment: The reference interval for Troponin I represents the 99th percentile   cutoff   for our facility and is consistent with 3rd generation assay   performance.      BNP 05/09/2023 105 (H)  0 - 99 pg/mL Final    Values of less than 100 pg/ml are consistent with non-CHF populations.    Specimen UA 05/09/2023 Urine, Clean Catch   Final    Color, UA 05/09/2023 Yellow  Yellow, Straw, Stacy Final    Appearance, UA 05/09/2023 Clear  Clear Final    pH, UA 05/09/2023 6.0  5.0 - 8.0 Final    Specific Gravity, UA 05/09/2023 1.010  1.005 - 1.030 Final    Protein, UA 05/09/2023 1+ (A)  Negative Final    Comment: Recommend a 24 hour urine protein or a urine   protein/creatinine ratio if globulin induced proteinuria is  clinically suspected.      Glucose, UA 05/09/2023 Trace (A)  Negative Final    Ketones, UA 05/09/2023 Negative  Negative Final    Bilirubin (UA) 05/09/2023 Negative  Negative Final    Occult Blood UA 05/09/2023 Negative  Negative Final    Nitrite, UA 05/09/2023 Negative  Negative Final     Urobilinogen, UA 05/09/2023 Negative  Negative EU/dL Final    Leukocytes, UA 05/09/2023 Negative  Negative Final    RBC, UA 05/09/2023 1  0 - 4 /hpf Final    WBC, UA 05/09/2023 4  0 - 5 /hpf Final    Bacteria 05/09/2023 Rare  None-Occ /hpf Final    Squam Epithel, UA 05/09/2023 0  /hpf Final    Hyaline Casts, UA 05/09/2023 11 (A)  0-1/lpf /lpf Final    Microscopic Comment 05/09/2023 SEE COMMENT   Final    Comment: Other formed elements not mentioned in the report are not   present in the microscopic examination.      Admission on 04/22/2023, Discharged on 04/22/2023   Component Date Value Ref Range Status    Sodium 04/22/2023 142  136 - 145 mmol/L Final    Potassium 04/22/2023 4.3  3.5 - 5.1 mmol/L Final    Chloride 04/22/2023 110  95 - 110 mmol/L Final    CO2 04/22/2023 21 (L)  23 - 29 mmol/L Final    Glucose 04/22/2023 142 (H)  70 - 110 mg/dL Final    BUN 04/22/2023 43 (H)  8 - 23 mg/dL Final    Creatinine 04/22/2023 2.8 (H)  0.5 - 1.4 mg/dL Final    Calcium 04/22/2023 9.1  8.7 - 10.5 mg/dL Final    Total Protein 04/22/2023 7.3  6.0 - 8.4 g/dL Final    Albumin 04/22/2023 3.5  3.5 - 5.2 g/dL Final    Total Bilirubin 04/22/2023 0.5  0.1 - 1.0 mg/dL Final    Comment: For infants and newborns, interpretation of results should be based  on gestational age, weight and in agreement with clinical  observations.    Premature Infant recommended reference ranges:  Up to 24 hours.............<8.0 mg/dL  Up to 48 hours............<12.0 mg/dL  3-5 days..................<15.0 mg/dL  6-29 days.................<15.0 mg/dL      Alkaline Phosphatase 04/22/2023 188 (H)  55 - 135 U/L Final    AST 04/22/2023 44 (H)  10 - 40 U/L Final    ALT 04/22/2023 120 (H)  10 - 44 U/L Final    Anion Gap 04/22/2023 11  8 - 16 mmol/L Final    eGFR 04/22/2023 23.0 (A)  >60 mL/min/1.73 m^2 Final    WBC 04/22/2023 6.05  3.90 - 12.70 K/uL Final    RBC 04/22/2023 4.03 (L)  4.60 - 6.20 M/uL Final    Hemoglobin 04/22/2023 11.7 (L)  14.0 - 18.0 g/dL Final     Hematocrit 04/22/2023 37.2 (L)  40.0 - 54.0 % Final    MCV 04/22/2023 92  82 - 98 fL Final    MCH 04/22/2023 29.0  27.0 - 31.0 pg Final    MCHC 04/22/2023 31.5 (L)  32.0 - 36.0 g/dL Final    RDW 04/22/2023 14.5  11.5 - 14.5 % Final    Platelets 04/22/2023 189  150 - 450 K/uL Final    MPV 04/22/2023 11.1  9.2 - 12.9 fL Final    Immature Granulocytes 04/22/2023 0.2  0.0 - 0.5 % Final    Gran # (ANC) 04/22/2023 3.8  1.8 - 7.7 K/uL Final    Immature Grans (Abs) 04/22/2023 0.01  0.00 - 0.04 K/uL Final    Comment: Mild elevation in immature granulocytes is non specific and   can be seen in a variety of conditions including stress response,   acute inflammation, trauma and pregnancy. Correlation with other   laboratory and clinical findings is essential.      Lymph # 04/22/2023 1.7  1.0 - 4.8 K/uL Final    Mono # 04/22/2023 0.4  0.3 - 1.0 K/uL Final    Eos # 04/22/2023 0.2  0.0 - 0.5 K/uL Final    Baso # 04/22/2023 0.04  0.00 - 0.20 K/uL Final    nRBC 04/22/2023 0  0 /100 WBC Final    Gran % 04/22/2023 62.0  38.0 - 73.0 % Final    Lymph % 04/22/2023 27.3  18.0 - 48.0 % Final    Mono % 04/22/2023 6.8  4.0 - 15.0 % Final    Eosinophil % 04/22/2023 3.0  0.0 - 8.0 % Final    Basophil % 04/22/2023 0.7  0.0 - 1.9 % Final    Differential Method 04/22/2023 Automated   Final    Total Protein 04/22/2023 7.3  6.0 - 8.4 g/dL Final    Albumin 04/22/2023 3.5  3.5 - 5.2 g/dL Final    Total Bilirubin 04/22/2023 0.5  0.1 - 1.0 mg/dL Final    Comment: For infants and newborns, interpretation of results should be based  on gestational age, weight and in agreement with clinical  observations.    Premature Infant recommended reference ranges:  Up to 24 hours.............<8.0 mg/dL  Up to 48 hours............<12.0 mg/dL  3-5 days..................<15.0 mg/dL  6-29 days.................<15.0 mg/dL      Bilirubin, Direct 04/22/2023 0.3  0.1 - 0.3 mg/dL Final    AST 04/22/2023 44 (H)  10 - 40 U/L Final    ALT 04/22/2023 120 (H)  10 - 44 U/L Final     Alkaline Phosphatase 04/22/2023 188 (H)  55 - 135 U/L Final    Lipase 04/22/2023 93 (H)  4 - 60 U/L Final   Lab Visit on 04/18/2023   Component Date Value Ref Range Status    Sodium 04/18/2023 141  136 - 145 mmol/L Final    Potassium 04/18/2023 3.9  3.5 - 5.1 mmol/L Final    Chloride 04/18/2023 107  95 - 110 mmol/L Final    CO2 04/18/2023 22 (L)  23 - 29 mmol/L Final    Glucose 04/18/2023 164 (H)  70 - 110 mg/dL Final    BUN 04/18/2023 44 (H)  8 - 23 mg/dL Final    Creatinine 04/18/2023 2.6 (H)  0.5 - 1.4 mg/dL Final    Calcium 04/18/2023 9.0  8.7 - 10.5 mg/dL Final    Total Protein 04/18/2023 7.2  6.0 - 8.4 g/dL Final    Albumin 04/18/2023 3.5  3.5 - 5.2 g/dL Final    Total Bilirubin 04/18/2023 1.5 (H)  0.1 - 1.0 mg/dL Final    Comment: For infants and newborns, interpretation of results should be based  on gestational age, weight and in agreement with clinical  observations.    Premature Infant recommended reference ranges:  Up to 24 hours.............<8.0 mg/dL  Up to 48 hours............<12.0 mg/dL  3-5 days..................<15.0 mg/dL  6-29 days.................<15.0 mg/dL      Alkaline Phosphatase 04/18/2023 202 (H)  55 - 135 U/L Final    AST 04/18/2023 416 (H)  10 - 40 U/L Final    ALT 04/18/2023 437 (H)  10 - 44 U/L Final    Anion Gap 04/18/2023 12  8 - 16 mmol/L Final    eGFR 04/18/2023 25.1 (A)  >60 mL/min/1.73 m^2 Final   Lab Visit on 04/10/2023   Component Date Value Ref Range Status    Sodium 04/10/2023 139  136 - 145 mmol/L Final    Potassium 04/10/2023 4.6  3.5 - 5.1 mmol/L Final    Chloride 04/10/2023 107  95 - 110 mmol/L Final    CO2 04/10/2023 20 (L)  23 - 29 mmol/L Final    Glucose 04/10/2023 152 (H)  70 - 110 mg/dL Final    BUN 04/10/2023 42 (H)  8 - 23 mg/dL Final    Creatinine 04/10/2023 2.3 (H)  0.5 - 1.4 mg/dL Final    Calcium 04/10/2023 9.0  8.7 - 10.5 mg/dL Final    Anion Gap 04/10/2023 12  8 - 16 mmol/L Final    eGFR 04/10/2023 29.1 (A)  >60 mL/min/1.73 m^2 Final   Lab Visit on  03/14/2023   Component Date Value Ref Range Status    WBC 03/14/2023 5.76  3.90 - 12.70 K/uL Final    RBC 03/14/2023 3.76 (L)  4.60 - 6.20 M/uL Final    Hemoglobin 03/14/2023 11.1 (L)  14.0 - 18.0 g/dL Final    Hematocrit 03/14/2023 35.4 (L)  40.0 - 54.0 % Final    MCV 03/14/2023 94  82 - 98 fL Final    MCH 03/14/2023 29.5  27.0 - 31.0 pg Final    MCHC 03/14/2023 31.4 (L)  32.0 - 36.0 g/dL Final    RDW 03/14/2023 14.8 (H)  11.5 - 14.5 % Final    Platelets 03/14/2023 166  150 - 450 K/uL Final    MPV 03/14/2023 11.2  9.2 - 12.9 fL Final    Immature Granulocytes 03/14/2023 0.3  0.0 - 0.5 % Final    Gran # (ANC) 03/14/2023 3.9  1.8 - 7.7 K/uL Final    Immature Grans (Abs) 03/14/2023 0.02  0.00 - 0.04 K/uL Final    Comment: Mild elevation in immature granulocytes is non specific and   can be seen in a variety of conditions including stress response,   acute inflammation, trauma and pregnancy. Correlation with other   laboratory and clinical findings is essential.      Lymph # 03/14/2023 1.3  1.0 - 4.8 K/uL Final    Mono # 03/14/2023 0.4  0.3 - 1.0 K/uL Final    Eos # 03/14/2023 0.2  0.0 - 0.5 K/uL Final    Baso # 03/14/2023 0.03  0.00 - 0.20 K/uL Final    nRBC 03/14/2023 0  0 /100 WBC Final    Gran % 03/14/2023 68.1  38.0 - 73.0 % Final    Lymph % 03/14/2023 22.2  18.0 - 48.0 % Final    Mono % 03/14/2023 6.3  4.0 - 15.0 % Final    Eosinophil % 03/14/2023 2.6  0.0 - 8.0 % Final    Basophil % 03/14/2023 0.5  0.0 - 1.9 % Final    Differential Method 03/14/2023 Automated   Final    Glucose 03/14/2023 155 (H)  70 - 110 mg/dL Final    Sodium 03/14/2023 142  136 - 145 mmol/L Final    Potassium 03/14/2023 4.3  3.5 - 5.1 mmol/L Final    Chloride 03/14/2023 107  95 - 110 mmol/L Final    CO2 03/14/2023 23  23 - 29 mmol/L Final    BUN 03/14/2023 36 (H)  8 - 23 mg/dL Final    Calcium 03/14/2023 8.8  8.7 - 10.5 mg/dL Final    Creatinine 03/14/2023 2.6 (H)  0.5 - 1.4 mg/dL Final    Albumin 03/14/2023 3.3 (L)  3.5 - 5.2 g/dL Final     Phosphorus 03/14/2023 3.5  2.7 - 4.5 mg/dL Final    eGFR 03/14/2023 25.2 (A)  >60 mL/min/1.73 m^2 Final    Anion Gap 03/14/2023 12  8 - 16 mmol/L Final    Specimen UA 03/14/2023 Urine, Clean Catch   Final    Color, UA 03/14/2023 Yellow  Yellow, Straw, Stacy Final    Appearance, UA 03/14/2023 Clear  Clear Final    pH, UA 03/14/2023 6.0  5.0 - 8.0 Final    Specific Gravity, UA 03/14/2023 1.020  1.005 - 1.030 Final    Protein, UA 03/14/2023 Trace (A)  Negative Final    Comment: Recommend a 24 hour urine protein or a urine   protein/creatinine ratio if globulin induced proteinuria is  clinically suspected.      Glucose, UA 03/14/2023 Trace (A)  Negative Final    Ketones, UA 03/14/2023 Negative  Negative Final    Bilirubin (UA) 03/14/2023 Negative  Negative Final    Occult Blood UA 03/14/2023 Negative  Negative Final    Nitrite, UA 03/14/2023 Negative  Negative Final    Urobilinogen, UA 03/14/2023 Negative  Negative EU/dL Final    Leukocytes, UA 03/14/2023 Negative  Negative Final    Protein, Urine Random 03/14/2023 22 (H)  0 - 15 mg/dL Final    Creatinine, Urine 03/14/2023 214.0  23.0 - 375.0 mg/dL Final    Prot/Creat Ratio, Urine 03/14/2023 0.10  0.00 - 0.20 Final    Sodium 03/14/2023 142  136 - 145 mmol/L Final    Potassium 03/14/2023 4.3  3.5 - 5.1 mmol/L Final    Chloride 03/14/2023 107  95 - 110 mmol/L Final    CO2 03/14/2023 23  23 - 29 mmol/L Final    Glucose 03/14/2023 155 (H)  70 - 110 mg/dL Final    BUN 03/14/2023 36 (H)  8 - 23 mg/dL Final    Creatinine 03/14/2023 2.6 (H)  0.5 - 1.4 mg/dL Final    Calcium 03/14/2023 8.8  8.7 - 10.5 mg/dL Final    Anion Gap 03/14/2023 12  8 - 16 mmol/L Final    eGFR 03/14/2023 25.2 (A)  >60 mL/min/1.73 m^2 Final    Cholesterol 03/14/2023 148  120 - 199 mg/dL Final    Comment: The National Cholesterol Education Program (NCEP) has set the  following guidelines (reference ranges) for Cholesterol:  Optimal.....................<200 mg/dL  Borderline High.............200-239  mg/dL  High........................> or = 240 mg/dL      Triglycerides 03/14/2023 220 (H)  30 - 150 mg/dL Final    Comment: The National Cholesterol Education Program (NCEP) has set the  following guidelines (reference values) for triglycerides:  Normal......................<150 mg/dL  Borderline High.............150-199 mg/dL  High........................200-499 mg/dL      HDL 03/14/2023 34 (L)  40 - 75 mg/dL Final    Comment: The National Cholesterol Education Program (NCEP) has set the  following guidelines (reference values) for HDL Cholesterol:  Low...............<40 mg/dL  Optimal...........>60 mg/dL      LDL Cholesterol 03/14/2023 70.0  63.0 - 159.0 mg/dL Final    Comment: The National Cholesterol Education Program (NCEP) has set the  following guidelines (reference values) for LDL Cholesterol:  Optimal.......................<130 mg/dL  Borderline High...............130-159 mg/dL  High..........................160-189 mg/dL  Very High.....................>190 mg/dL      HDL/Cholesterol Ratio 03/14/2023 23.0  20.0 - 50.0 % Final    Total Cholesterol/HDL Ratio 03/14/2023 4.4  2.0 - 5.0 Final    Non-HDL Cholesterol 03/14/2023 114  mg/dL Final    Comment: Risk category and Non-HDL cholesterol goals:  Coronary heart disease (CHD)or equivalent (10-year risk of CHD >20%):  Non-HDL cholesterol goal     <130 mg/dL  Two or more CHD risk factors and 10-year risk of CHD <= 20%:  Non-HDL cholesterol goal     <160 mg/dL  0 to 1 CHD risk factor:  Non-HDL cholesterol goal     <190 mg/dL      Magnesium 03/14/2023 1.3 (L)  1.6 - 2.6 mg/dL Final   Lab Visit on 03/06/2023   Component Date Value Ref Range Status    Urine Volume 03/06/2023 2900  mL Final    Urine Collection Duration 03/06/2023 24  Hr Final    Sodium, Urine 03/06/2023 50  20 - 250 mmol/L Final    Sodium, Timed Urine 03/06/2023 6.0  2.0 - 9.0 mmol/Hr Final    Sodium, Urine (mmol/spec) 03/06/2023 145  mmol/Spec Final    Urine Volume 03/06/2023 2900  mL Final    Urine  Collection Duration 03/06/2023 24  Hr Final    Urea Nitrogen, Urine 03/06/2023 355  140 - 1050 mg/dL Final    Urea Nitrogen, Timed Urine 03/06/2023 429 (L)  500 - 850 mg/Hr Final    Urea Nit Urine (mg/spec) 03/06/2023 00476.0  mg/Spec Final    Urine Volume 03/06/2023 2900  mL Final    Urine Collection Duration 03/06/2023 24  Hr Final    Creatinine, Urine 03/06/2023 50.0  23.0 - 375.0 mg/dL Final    Creatinine, Timed Urine 03/06/2023 60.4  40.0 - 75.0 mg/Hr Final    Creatinine, Urinr (mg/spec) 03/06/2023 1450.0  mg/Spec Final   Admission on 02/24/2023, Discharged on 02/24/2023   Component Date Value Ref Range Status    WBC 02/24/2023 5.44  3.90 - 12.70 K/uL Final    RBC 02/24/2023 3.52 (L)  4.60 - 6.20 M/uL Final    Hemoglobin 02/24/2023 10.5 (L)  14.0 - 18.0 g/dL Final    Hematocrit 02/24/2023 33.4 (L)  40.0 - 54.0 % Final    MCV 02/24/2023 95  82 - 98 fL Final    MCH 02/24/2023 29.8  27.0 - 31.0 pg Final    MCHC 02/24/2023 31.4 (L)  32.0 - 36.0 g/dL Final    RDW 02/24/2023 15.2 (H)  11.5 - 14.5 % Final    Platelets 02/24/2023 320  150 - 450 K/uL Final    MPV 02/24/2023 10.0  9.2 - 12.9 fL Final    Immature Granulocytes 02/24/2023 0.2  0.0 - 0.5 % Final    Gran # (ANC) 02/24/2023 3.9  1.8 - 7.7 K/uL Final    Immature Grans (Abs) 02/24/2023 0.01  0.00 - 0.04 K/uL Final    Comment: Mild elevation in immature granulocytes is non specific and   can be seen in a variety of conditions including stress response,   acute inflammation, trauma and pregnancy. Correlation with other   laboratory and clinical findings is essential.      Lymph # 02/24/2023 1.0  1.0 - 4.8 K/uL Final    Mono # 02/24/2023 0.3  0.3 - 1.0 K/uL Final    Eos # 02/24/2023 0.2  0.0 - 0.5 K/uL Final    Baso # 02/24/2023 0.04  0.00 - 0.20 K/uL Final    nRBC 02/24/2023 0  0 /100 WBC Final    Gran % 02/24/2023 70.9  38.0 - 73.0 % Final    Lymph % 02/24/2023 18.9  18.0 - 48.0 % Final    Mono % 02/24/2023 5.3  4.0 - 15.0 % Final    Eosinophil % 02/24/2023 4.0   0.0 - 8.0 % Final    Basophil % 02/24/2023 0.7  0.0 - 1.9 % Final    Differential Method 02/24/2023 Automated   Final    Sodium 02/24/2023 139  136 - 145 mmol/L Final    Potassium 02/24/2023 4.4  3.5 - 5.1 mmol/L Final    Chloride 02/24/2023 110  95 - 110 mmol/L Final    CO2 02/24/2023 17 (L)  23 - 29 mmol/L Final    Glucose 02/24/2023 159 (H)  70 - 110 mg/dL Final    BUN 02/24/2023 31 (H)  8 - 23 mg/dL Final    Creatinine 02/24/2023 2.4 (H)  0.5 - 1.4 mg/dL Final    Calcium 02/24/2023 8.9  8.7 - 10.5 mg/dL Final    Total Protein 02/24/2023 7.1  6.0 - 8.4 g/dL Final    Albumin 02/24/2023 3.3 (L)  3.5 - 5.2 g/dL Final    Total Bilirubin 02/24/2023 0.8  0.1 - 1.0 mg/dL Final    Comment: For infants and newborns, interpretation of results should be based  on gestational age, weight and in agreement with clinical  observations.    Premature Infant recommended reference ranges:  Up to 24 hours.............<8.0 mg/dL  Up to 48 hours............<12.0 mg/dL  3-5 days..................<15.0 mg/dL  6-29 days.................<15.0 mg/dL      Alkaline Phosphatase 02/24/2023 208 (H)  55 - 135 U/L Final    AST 02/24/2023 58 (H)  10 - 40 U/L Final    ALT 02/24/2023 45 (H)  10 - 44 U/L Final    Anion Gap 02/24/2023 12  8 - 16 mmol/L Final    eGFR 02/24/2023 27.8 (A)  >60 mL/min/1.73 m^2 Final    Lipase 02/24/2023 327 (H)  4 - 60 U/L Final    Specimen UA 02/24/2023 Urine, Clean Catch   Final    Color, UA 02/24/2023 Colorless (A)  Yellow, Straw, Stacy Final    Appearance, UA 02/24/2023 Clear  Clear Final    pH, UA 02/24/2023 5.0  5.0 - 8.0 Final    Specific Gravity, UA 02/24/2023 1.010  1.005 - 1.030 Final    Protein, UA 02/24/2023 Negative  Negative Final    Comment: Recommend a 24 hour urine protein or a urine   protein/creatinine ratio if globulin induced proteinuria is  clinically suspected.      Glucose, UA 02/24/2023 Negative  Negative Final    Ketones, UA 02/24/2023 Negative  Negative Final    Bilirubin (UA) 02/24/2023  Negative  Negative Final    Occult Blood UA 02/24/2023 Negative  Negative Final    Nitrite, UA 02/24/2023 Negative  Negative Final    Urobilinogen, UA 02/24/2023 Negative  Negative EU/dL Final    Leukocytes, UA 02/24/2023 Negative  Negative Final    Troponin I 02/24/2023 0.006  0.000 - 0.026 ng/mL Final    Comment: The reference interval for Troponin I represents the 99th percentile   cutoff   for our facility and is consistent with 3rd generation assay   performance.      Lactate (Lactic Acid) 02/24/2023 1.8  0.5 - 2.2 mmol/L Final    Comment: Falsely low lactic acid results can be found in samples   containing >=13.0 mg/dL total bilirubin and/or >=3.5 mg/dL   direct bilirubin.      Blood Culture, Routine 02/24/2023 No growth after 5 days.   Final    Blood Culture, Routine 02/24/2023 No growth after 5 days.   Final    Group & Rh 02/24/2023 A POS   Final    Indirect Servando 02/24/2023 NEG   Final   Lab Visit on 02/22/2023   Component Date Value Ref Range Status    Sodium 02/22/2023 144  136 - 145 mmol/L Final    Potassium 02/22/2023 4.6  3.5 - 5.1 mmol/L Final    Chloride 02/22/2023 111 (H)  95 - 110 mmol/L Final    CO2 02/22/2023 22 (L)  23 - 29 mmol/L Final    Glucose 02/22/2023 109  70 - 110 mg/dL Final    BUN 02/22/2023 32 (H)  8 - 23 mg/dL Final    Creatinine 02/22/2023 2.7 (H)  0.5 - 1.4 mg/dL Final    Calcium 02/22/2023 9.1  8.7 - 10.5 mg/dL Final    Total Protein 02/22/2023 7.0  6.0 - 8.4 g/dL Final    Albumin 02/22/2023 3.3 (L)  3.5 - 5.2 g/dL Final    Total Bilirubin 02/22/2023 0.9  0.1 - 1.0 mg/dL Final    Comment: For infants and newborns, interpretation of results should be based  on gestational age, weight and in agreement with clinical  observations.    Premature Infant recommended reference ranges:  Up to 24 hours.............<8.0 mg/dL  Up to 48 hours............<12.0 mg/dL  3-5 days..................<15.0 mg/dL  6-29 days.................<15.0 mg/dL      Alkaline Phosphatase 02/22/2023 208 (H)  55 -  135 U/L Final    AST 02/22/2023 46 (H)  10 - 40 U/L Final    ALT 02/22/2023 44  10 - 44 U/L Final    Anion Gap 02/22/2023 11  8 - 16 mmol/L Final    eGFR 02/22/2023 24.1 (A)  >60 mL/min/1.73 m^2 Final    WBC 02/22/2023 6.84  3.90 - 12.70 K/uL Final    RBC 02/22/2023 3.58 (L)  4.60 - 6.20 M/uL Final    Hemoglobin 02/22/2023 10.5 (L)  14.0 - 18.0 g/dL Final    Hematocrit 02/22/2023 34.3 (L)  40.0 - 54.0 % Final    MCV 02/22/2023 96  82 - 98 fL Final    MCH 02/22/2023 29.3  27.0 - 31.0 pg Final    MCHC 02/22/2023 30.6 (L)  32.0 - 36.0 g/dL Final    RDW 02/22/2023 15.3 (H)  11.5 - 14.5 % Final    Platelets 02/22/2023 322  150 - 450 K/uL Final    MPV 02/22/2023 10.3  9.2 - 12.9 fL Final    Immature Granulocytes 02/22/2023 0.6 (H)  0.0 - 0.5 % Final    Gran # (ANC) 02/22/2023 5.1  1.8 - 7.7 K/uL Final    Immature Grans (Abs) 02/22/2023 0.04  0.00 - 0.04 K/uL Final    Comment: Mild elevation in immature granulocytes is non specific and   can be seen in a variety of conditions including stress response,   acute inflammation, trauma and pregnancy. Correlation with other   laboratory and clinical findings is essential.      Lymph # 02/22/2023 1.2  1.0 - 4.8 K/uL Final    Mono # 02/22/2023 0.3  0.3 - 1.0 K/uL Final    Eos # 02/22/2023 0.2  0.0 - 0.5 K/uL Final    Baso # 02/22/2023 0.03  0.00 - 0.20 K/uL Final    nRBC 02/22/2023 0  0 /100 WBC Final    Gran % 02/22/2023 74.4 (H)  38.0 - 73.0 % Final    Lymph % 02/22/2023 17.8 (L)  18.0 - 48.0 % Final    Mono % 02/22/2023 4.2  4.0 - 15.0 % Final    Eosinophil % 02/22/2023 2.6  0.0 - 8.0 % Final    Basophil % 02/22/2023 0.4  0.0 - 1.9 % Final    Differential Method 02/22/2023 Automated   Final    TSH 02/22/2023 2.178  0.400 - 4.000 uIU/mL Final    Hemoglobin A1C 02/22/2023 6.6 (H)  4.0 - 5.6 % Final    Comment: ADA Screening Guidelines:  5.7-6.4%  Consistent with prediabetes  >or=6.5%  Consistent with diabetes    High levels of fetal hemoglobin interfere with the HbA1C  assay.  Heterozygous hemoglobin variants (HbS, HgC, etc)do  not significantly interfere with this assay.   However, presence of multiple variants may affect accuracy.      Estimated Avg Glucose 02/22/2023 143 (H)  68 - 131 mg/dL Final   No results displayed because visit has over 200 results.      There may be more visits with results that are not included.       EKG  05/09/2023 normal sinus rhythm right bundle-branch block left axis deviation consider left anterior fascicular block nonspecific ST changes abnormal precordial R-wave progression    Echo   Results for orders placed or performed during the hospital encounter of 12/15/22   Echo   Result Value Ref Range    BSA 2.26 m2    TDI SEPTAL 0.06 m/s    LV LATERAL E/E' RATIO 3.36 m/s    LV SEPTAL E/E' RATIO 6.17 m/s    Left Ventricular Outflow Tract Mean Velocity 0.74 cm/s    Left Ventricular Outflow Tract Mean Gradient 2.35 mmHg    AORTIC VALVE CUSP SEPERATION 1.69 cm    TDI LATERAL 0.11 m/s    PV PEAK VELOCITY 1.15 cm/s    LVIDd 5.13 3.5 - 6.0 cm    IVS 1.31 (A) 0.6 - 1.1 cm    Posterior Wall 1.32 (A) 0.6 - 1.1 cm    Ao root annulus 3.57 cm    LVIDs 4.01 (A) 2.1 - 4.0 cm    FS 22 28 - 44 %    LV mass 277.06 g    RVDD 3.24 cm    Left Ventricle Relative Wall Thickness 0.51 cm    AV Velocity Ratio 0.71     MV valve area p 1/2 method 2.76 cm2    E/A ratio 0.56     Mean e' 0.09 m/s    E wave deceleration time 274.49 msec    IVRT 72.31 msec    LVOT diameter 1.94 cm    LVOT area 3.0 cm2    LVOT peak joao 0.97 m/s    LVOT peak VTI 23.20 cm    Ao peak joao 1.36 m/s    LVOT stroke volume 68.54 cm3    AV peak gradient 7 mmHg    E/E' ratio 4.35 m/s    MV Peak E Joao 0.37 m/s    TR Max Joao 2.83 m/s    MV stenosis pressure 1/2 time 79.60 ms    MV Peak A Joao 0.66 m/s    LV Systolic Volume 70.32 mL    LV Systolic Volume Index 31.3 mL/m2    LV Diastolic Volume 125.27 mL    LV Diastolic Volume Index 55.68 mL/m2    LV Mass Index 123 g/m2    RA Major Axis 5.09 cm    Left Atrium Minor Axis 3.29  cm    Left Atrium Major Axis 6.12 cm    Triscuspid Valve Regurgitation Peak Gradient 32 mmHg    LA Volume Index (Mod) 22.2 mL/m2    LA volume (mod) 49.99 cm3    RA Width 2.10 cm    EF 60 %    Proximal aorta 3.60 cm    Narrative    · The left ventricle is normal in size with mild concentric hypertrophy   and normal systolic function.  · The estimated ejection fraction is 60%. Technically difficult study. EF   is based on limited views with poor endocardial border visualization.  · Indeterminate left ventricular diastolic function.  · Normal right ventricular size with normal right ventricular systolic   function.  · PASP is 32 plus central venous pressure which could not be determined.  · The ascending aorta is mildly dilated.          Imaging  No results found.    Prior coronary angiogram / intervention:  See HPI    Assessment and Plan  1. Syncope, orthostatic / autonomic dysfunction  See regimen in HPI  Follow up with Neurology  Needs to be on Toprol for VT/NSVT  Has midodrine p.r.n. and is on fludrocortisone  Scheduled IVF infusions and p.o. appreciate nephrology assistance  Gastroenterology is attempting to prevent watery stool  PCP is placed the patient on oxybutynin to see if this helps  Continue current medication regimen for orthostatic hypotension/autonomic dysfunction/hypertension    2. Orthostatic hypotension /autonomic dysfunction  As above    3. Essential hypertension  As above  Patient has labile hypertension with frequent hypotension very complex as in HPI  Continue Toprol with hydralazine p.r.n.     4. Coronary artery disease involving native coronary artery of native heart without angina pectoris  Continue aspirin 81 restart Pravachol 40  Evidently there are allergies to Crestor and Lipitor.  These allergies are unknown.    5. Prolonged QT  Avoid QT prolonging agents     6. Hyperlipidemia, unspecified hyperlipidemia type  Restart 40 of pravastatin    7. VT (ventricular tachycardia) +/- pAF?  The  patient has had implanted LINQ device  Continue BB  Do not have enough evidence of PAF per electrophysiology  See HPI  Follow up with electrophysiology as he is now established    8. Ascending aortic aneurysm, unspecified whether ruptured  Control hypertension on beta-blocker  Serial monitoring difficult with advanced CKD    9. Carotid stenosis  Not hemodynamically significant    Follow Up  3 month  Make follow-up appointment with Gastroenterology      Aj Marrufo MD, FACC, RPVI  Interventional Cardiology

## 2023-05-31 NOTE — PROGRESS NOTES
Patient received 1 liter Normal Saline x 1hr, pt tolerated well & left in NAD, next appt made

## 2023-06-02 ENCOUNTER — OFFICE VISIT (OUTPATIENT)
Dept: PRIMARY CARE CLINIC | Facility: CLINIC | Age: 73
End: 2023-06-02
Payer: MEDICARE

## 2023-06-02 ENCOUNTER — INFUSION (OUTPATIENT)
Dept: INFECTIOUS DISEASES | Facility: HOSPITAL | Age: 73
End: 2023-06-02
Payer: MEDICARE

## 2023-06-02 VITALS
HEART RATE: 67 BPM | OXYGEN SATURATION: 96 % | WEIGHT: 213.38 LBS | DIASTOLIC BLOOD PRESSURE: 79 MMHG | TEMPERATURE: 99 F | BODY MASS INDEX: 27.4 KG/M2 | SYSTOLIC BLOOD PRESSURE: 165 MMHG

## 2023-06-02 VITALS
HEIGHT: 74 IN | RESPIRATION RATE: 17 BRPM | DIASTOLIC BLOOD PRESSURE: 72 MMHG | BODY MASS INDEX: 27.59 KG/M2 | WEIGHT: 214.94 LBS | OXYGEN SATURATION: 98 % | TEMPERATURE: 98 F | SYSTOLIC BLOOD PRESSURE: 130 MMHG | HEART RATE: 70 BPM

## 2023-06-02 DIAGNOSIS — E11.22 TYPE 2 DIABETES MELLITUS WITH STAGE 3B CHRONIC KIDNEY DISEASE, WITHOUT LONG-TERM CURRENT USE OF INSULIN: ICD-10-CM

## 2023-06-02 DIAGNOSIS — N18.32 TYPE 2 DIABETES MELLITUS WITH STAGE 3B CHRONIC KIDNEY DISEASE, WITHOUT LONG-TERM CURRENT USE OF INSULIN: ICD-10-CM

## 2023-06-02 DIAGNOSIS — E78.5 HYPERLIPIDEMIA, UNSPECIFIED HYPERLIPIDEMIA TYPE: ICD-10-CM

## 2023-06-02 DIAGNOSIS — J37.0 CHRONIC LARYNGITIS: Primary | ICD-10-CM

## 2023-06-02 DIAGNOSIS — G63 POLYNEUROPATHY ASSOCIATED WITH UNDERLYING DISEASE: ICD-10-CM

## 2023-06-02 DIAGNOSIS — N18.32 STAGE 3B CHRONIC KIDNEY DISEASE: ICD-10-CM

## 2023-06-02 DIAGNOSIS — R49.0 CHRONIC HOARSENESS: ICD-10-CM

## 2023-06-02 DIAGNOSIS — E83.42 HYPOMAGNESEMIA: ICD-10-CM

## 2023-06-02 DIAGNOSIS — Z93.3 COLOSTOMY PRESENT: ICD-10-CM

## 2023-06-02 DIAGNOSIS — I10 HYPERTENSION, UNSPECIFIED TYPE: ICD-10-CM

## 2023-06-02 DIAGNOSIS — F32.A ANXIETY AND DEPRESSION: ICD-10-CM

## 2023-06-02 DIAGNOSIS — F41.9 ANXIETY AND DEPRESSION: ICD-10-CM

## 2023-06-02 DIAGNOSIS — E86.0 DEHYDRATION: Primary | ICD-10-CM

## 2023-06-02 DIAGNOSIS — G89.29 CHRONIC PAIN OF LEFT ANKLE: ICD-10-CM

## 2023-06-02 DIAGNOSIS — M25.572 CHRONIC PAIN OF LEFT ANKLE: ICD-10-CM

## 2023-06-02 PROCEDURE — 3066F PR DOCUMENTATION OF TREATMENT FOR NEPHROPATHY: ICD-10-PCS | Mod: CPTII,S$GLB,, | Performed by: INTERNAL MEDICINE

## 2023-06-02 PROCEDURE — 1160F PR REVIEW ALL MEDS BY PRESCRIBER/CLIN PHARMACIST DOCUMENTED: ICD-10-PCS | Mod: CPTII,S$GLB,, | Performed by: INTERNAL MEDICINE

## 2023-06-02 PROCEDURE — 3075F PR MOST RECENT SYSTOLIC BLOOD PRESS GE 130-139MM HG: ICD-10-PCS | Mod: CPTII,S$GLB,, | Performed by: INTERNAL MEDICINE

## 2023-06-02 PROCEDURE — 3066F NEPHROPATHY DOC TX: CPT | Mod: CPTII,S$GLB,, | Performed by: INTERNAL MEDICINE

## 2023-06-02 PROCEDURE — 3044F PR MOST RECENT HEMOGLOBIN A1C LEVEL <7.0%: ICD-10-PCS | Mod: CPTII,S$GLB,, | Performed by: INTERNAL MEDICINE

## 2023-06-02 PROCEDURE — 1101F PT FALLS ASSESS-DOCD LE1/YR: CPT | Mod: CPTII,S$GLB,, | Performed by: INTERNAL MEDICINE

## 2023-06-02 PROCEDURE — 3044F HG A1C LEVEL LT 7.0%: CPT | Mod: CPTII,S$GLB,, | Performed by: INTERNAL MEDICINE

## 2023-06-02 PROCEDURE — 99214 OFFICE O/P EST MOD 30 MIN: CPT | Mod: S$GLB,,, | Performed by: INTERNAL MEDICINE

## 2023-06-02 PROCEDURE — 3078F DIAST BP <80 MM HG: CPT | Mod: CPTII,S$GLB,, | Performed by: INTERNAL MEDICINE

## 2023-06-02 PROCEDURE — 1159F MED LIST DOCD IN RCRD: CPT | Mod: CPTII,S$GLB,, | Performed by: INTERNAL MEDICINE

## 2023-06-02 PROCEDURE — 3008F BODY MASS INDEX DOCD: CPT | Mod: CPTII,S$GLB,, | Performed by: INTERNAL MEDICINE

## 2023-06-02 PROCEDURE — 3288F FALL RISK ASSESSMENT DOCD: CPT | Mod: CPTII,S$GLB,, | Performed by: INTERNAL MEDICINE

## 2023-06-02 PROCEDURE — 3078F PR MOST RECENT DIASTOLIC BLOOD PRESSURE < 80 MM HG: ICD-10-PCS | Mod: CPTII,S$GLB,, | Performed by: INTERNAL MEDICINE

## 2023-06-02 PROCEDURE — 1111F PR DISCHARGE MEDS RECONCILED W/ CURRENT OUTPATIENT MED LIST: ICD-10-PCS | Mod: CPTII,S$GLB,, | Performed by: INTERNAL MEDICINE

## 2023-06-02 PROCEDURE — 99214 PR OFFICE/OUTPT VISIT, EST, LEVL IV, 30-39 MIN: ICD-10-PCS | Mod: S$GLB,,, | Performed by: INTERNAL MEDICINE

## 2023-06-02 PROCEDURE — 99999 PR PBB SHADOW E&M-EST. PATIENT-LVL V: ICD-10-PCS | Mod: PBBFAC,,, | Performed by: INTERNAL MEDICINE

## 2023-06-02 PROCEDURE — 1101F PR PT FALLS ASSESS DOC 0-1 FALLS W/OUT INJ PAST YR: ICD-10-PCS | Mod: CPTII,S$GLB,, | Performed by: INTERNAL MEDICINE

## 2023-06-02 PROCEDURE — 25000003 PHARM REV CODE 250: Performed by: NURSE PRACTITIONER

## 2023-06-02 PROCEDURE — 1160F RVW MEDS BY RX/DR IN RCRD: CPT | Mod: CPTII,S$GLB,, | Performed by: INTERNAL MEDICINE

## 2023-06-02 PROCEDURE — 3075F SYST BP GE 130 - 139MM HG: CPT | Mod: CPTII,S$GLB,, | Performed by: INTERNAL MEDICINE

## 2023-06-02 PROCEDURE — 3008F PR BODY MASS INDEX (BMI) DOCUMENTED: ICD-10-PCS | Mod: CPTII,S$GLB,, | Performed by: INTERNAL MEDICINE

## 2023-06-02 PROCEDURE — 3288F PR FALLS RISK ASSESSMENT DOCUMENTED: ICD-10-PCS | Mod: CPTII,S$GLB,, | Performed by: INTERNAL MEDICINE

## 2023-06-02 PROCEDURE — 99999 PR PBB SHADOW E&M-EST. PATIENT-LVL V: CPT | Mod: PBBFAC,,, | Performed by: INTERNAL MEDICINE

## 2023-06-02 PROCEDURE — 1126F AMNT PAIN NOTED NONE PRSNT: CPT | Mod: CPTII,S$GLB,, | Performed by: INTERNAL MEDICINE

## 2023-06-02 PROCEDURE — 1111F DSCHRG MED/CURRENT MED MERGE: CPT | Mod: CPTII,S$GLB,, | Performed by: INTERNAL MEDICINE

## 2023-06-02 PROCEDURE — 1126F PR PAIN SEVERITY QUANTIFIED, NO PAIN PRESENT: ICD-10-PCS | Mod: CPTII,S$GLB,, | Performed by: INTERNAL MEDICINE

## 2023-06-02 PROCEDURE — 96360 HYDRATION IV INFUSION INIT: CPT

## 2023-06-02 PROCEDURE — 1159F PR MEDICATION LIST DOCUMENTED IN MEDICAL RECORD: ICD-10-PCS | Mod: CPTII,S$GLB,, | Performed by: INTERNAL MEDICINE

## 2023-06-02 RX ORDER — SODIUM CHLORIDE 0.9 % (FLUSH) 0.9 %
10 SYRINGE (ML) INJECTION
Status: CANCELLED | OUTPATIENT
Start: 2023-06-06

## 2023-06-02 RX ORDER — CALCIUM CARBONATE 300MG(750)
TABLET,CHEWABLE ORAL
Qty: 90 TABLET | Refills: 3 | Status: SHIPPED | OUTPATIENT
Start: 2023-06-02 | End: 2023-09-05 | Stop reason: SDUPTHER

## 2023-06-02 RX ORDER — HEPARIN 100 UNIT/ML
500 SYRINGE INTRAVENOUS
Status: DISCONTINUED | OUTPATIENT
Start: 2023-06-02 | End: 2023-06-02 | Stop reason: HOSPADM

## 2023-06-02 RX ORDER — SODIUM CHLORIDE 0.9 % (FLUSH) 0.9 %
10 SYRINGE (ML) INJECTION
Status: DISCONTINUED | OUTPATIENT
Start: 2023-06-02 | End: 2023-06-02 | Stop reason: HOSPADM

## 2023-06-02 RX ORDER — HEPARIN 100 UNIT/ML
500 SYRINGE INTRAVENOUS
Status: CANCELLED | OUTPATIENT
Start: 2023-06-06

## 2023-06-02 RX ADMIN — SODIUM CHLORIDE 1000 ML: 9 INJECTION, SOLUTION INTRAVENOUS at 08:06

## 2023-06-02 NOTE — PROGRESS NOTES
Patient received 1 liter Normal Saline x 1hr, pt tolerated well & left in NAD, next appt made

## 2023-06-02 NOTE — PROGRESS NOTES
Subjective:       Patient ID: Jarrett Charlton Jr. is a 73 y.o. male.    Chief Complaint: Diabetes (Follow up)    HPI  Pt with h/o CKD anxiety depression high output ileostomy gstting IVF q weekly pt viist today [facility] he was admitted for depression with suicidal ideation which pt states it just slip hrt tongue when he mention somtime he just want to go when he was in nephrology clinic . He is ding ok now still with left ankle pain sometime feel like someone stick a knife into his ankle no other joint pain no trauma has been going on for a while was treated as gout but still hurt. And he also having chronic hoaresness not better has not been seen by ENT yet no wt loss pt still getting weekly IVF for high OP ileostomy  Review of Systems    Objective:      Physical Exam  Vitals and nursing note reviewed.   Constitutional:       General: He is not in acute distress.     Appearance: He is well-developed.   HENT:      Head: Normocephalic and atraumatic.      Right Ear: External ear normal.      Left Ear: External ear normal.      Nose: Nose normal.      Mouth/Throat:      Pharynx: No oropharyngeal exudate.   Eyes:      Extraocular Movements: Extraocular movements intact.      Conjunctiva/sclera: Conjunctivae normal.      Pupils: Pupils are equal, round, and reactive to light.   Neck:      Thyroid: No thyromegaly.   Cardiovascular:      Rate and Rhythm: Normal rate and regular rhythm.      Heart sounds: Normal heart sounds. No murmur heard.    No friction rub. No gallop.   Pulmonary:      Effort: Pulmonary effort is normal. No respiratory distress.      Breath sounds: Normal breath sounds. No wheezing.   Abdominal:      General: Bowel sounds are normal. There is no distension.      Palpations: Abdomen is soft.      Tenderness: There is no abdominal tenderness.      Comments: Ileostomy functioning    Musculoskeletal:         General: Tenderness (subjective tenderness in left ankle) present. No  deformity. Normal range of motion.      Cervical back: Normal range of motion and neck supple.   Lymphadenopathy:      Cervical: No cervical adenopathy.   Skin:     General: Skin is warm and dry.      Capillary Refill: Capillary refill takes less than 2 seconds.      Findings: No erythema or rash.   Neurological:      Mental Status: He is alert and oriented to person, place, and time.   Psychiatric:         Thought Content: Thought content normal.         Judgment: Judgment normal.       Assessment:       1. Chronic laryngitis    2. Hypomagnesemia    3. Chronic hoarseness    4. Chronic pain of left ankle    5. Anxiety and depression    6. Type 2 diabetes mellitus with stage 3b chronic kidney disease, without long-term current use of insulin    7. Stage 3b chronic kidney disease    8. Hypertension, unspecified type    9. Hyperlipidemia, unspecified hyperlipidemia type    10. Polyneuropathy associated with underlying disease        Plan:       Chronic laryngitis  -     Ambulatory referral/consult to ENT; Future; Expected date: 06/09/2023    Hypomagnesemia  -     magnesium oxide 400 mg magnesium Tab; 1 po qdaily  Dispense: 90 tablet; Refill: 3    Chronic hoarseness  -     Ambulatory referral/consult to ENT; Future; Expected date: 06/09/2023    Chronic pain of left ankle  -     Ambulatory referral/consult to Orthopedics; Future; Expected date: 06/09/2023    Anxiety and depression  Comments:  keep appt f/u with psych    Type 2 diabetes mellitus with stage 3b chronic kidney disease, without long-term current use of insulin  Comments:  fairly controlled Hgba1c 7.0 need repeat   Orders:  -     Hemoglobin A1C; Future; Expected date: 06/04/2023  -     Diabetes Digital Medicine (DDMP) Enrollment Order    Stage 3b chronic kidney disease  Comments:  currently stable with renal lesions on CT scan benign on u/s  avoids NSAIDS  Orders:  -     Comprehensive Metabolic Panel; Future; Expected date: 06/04/2023  -     CBC Auto  Differential; Future; Expected date: 06/04/2023    Hypertension, unspecified type  Comments:  HBP fairly controlled  Orders:  -     Hypertension Digital Medicine (HDMP) Enrollment Order    Hyperlipidemia, unspecified hyperlipidemia type  -     Lipid Panel; Future; Expected date: 06/04/2023  -     Lipids Digital Medicine (LDMP) Enrollment Order    Polyneuropathy associated with underlying disease        Medication List with Changes/Refills   Current Medications    ACCU-CHEK PAUL CONTROL SOLN SOLN    1 drop by NOT APPLICABLE route once daily.    ACCU-CHEK SOFTCLIX LANCETS [facility]    TEST BLOOD SUGAR THREE TIMES DAILY    ALLOPURINOL (ZYLOPRIM) 300 MG TABLET    Take 1.5 tablets (450 mg total) by mouth once daily.    ASPIRIN (ECOTRIN) 81 MG EC TABLET    Take 1 tablet (81 mg total) by mouth once daily.    BD ALCOHOL SWABS PADM    Apply 1 each topically 5 (five) times daily.    BLOOD SUGAR DIAGNOSTIC (ONETOUCH ULTRA TEST) STRP    USE ONE STRIP TO CHECK GLUCOSE EVERY DAY    BLOOD-GLUCOSE METER (ACCU-CHEK GUIDE GLUCOSE METER) [facility]    Accucheck BID    BRIMONIDINE 0.2% (ALPHAGAN) 0.2 % DROP    Place 1 drop into both eyes 2 (two) times daily.    CALCIUM-VITAMIN D3 (OS-DAKOTA 500 + D3) 500 MG-5 MCG (200 UNIT) PER TABLET    Take 1 tablet by mouth once daily.    CETIRIZINE (ZYRTEC) 5 MG TABLET    Take 1 tablet (5 mg total) by mouth once daily.    CHOLESTYRAMINE-ASPARTAME (QUESTRAN LIGHT) 4 GRAM PWPK    Take 1 packet (4 g total) by mouth 3 (three) times daily with meals.    FIBER, CALCIUM POLYCARBOPHIL, 625 MG TABLET    Take 3 tablets by mouth before dinner.    FLUDROCORTISONE (FLORINEF) 0.1 MG TAB    Take 1 tablet (100 mcg total) by mouth once daily.    GABAPENTIN (NEURONTIN) 100 MG CAPSULE    Take 2 capsules (200 mg total) by mouth 3 (three) times daily.    GLIMEPIRIDE (AMARYL) 4 MG TABLET    Take 1 tablet (4 mg total) by mouth 2 (two) times daily before meals.    LIDOCAINE (LIDODERM) 5 %    Place 1 patch onto the skin as needed.  Remove & Discard patch within 12 hours or as directed by MD    METOPROLOL SUCCINATE (TOPROL-XL) 25 MG 24 HR TABLET    Take 1 tablet (25 mg total) by mouth once daily.    MIDODRINE (PROAMATINE) 10 MG TABLET    Take 1 tablet (10 mg total) by mouth daily as needed (Hypotension).    OXYBUTYNIN (DITROPAN-XL) 10 MG 24 HR TABLET    Take 1 tablet (10 mg total) by mouth once daily.    PANTOPRAZOLE (PROTONIX) 40 MG TABLET    Take 1 tablet (40 mg total) by mouth once daily.    PRAVASTATIN (PRAVACHOL) 40 MG TABLET    Take 1 tablet (40 mg total) by mouth once daily.   Changed and/or Refilled Medications    Modified Medication Previous Medication    MAGNESIUM OXIDE 400 MG MAGNESIUM TAB magnesium oxide 400 mg magnesium Tab       1 po qdaily    1 po qdaily

## 2023-06-05 ENCOUNTER — TELEPHONE (OUTPATIENT)
Dept: OTOLARYNGOLOGY | Facility: CLINIC | Age: 73
End: 2023-06-05
Payer: MEDICARE

## 2023-06-05 ENCOUNTER — TELEPHONE (OUTPATIENT)
Dept: FAMILY MEDICINE | Facility: CLINIC | Age: 73
End: 2023-06-05
Payer: MEDICARE

## 2023-06-05 NOTE — TELEPHONE ENCOUNTER
----- Message from Kelly Sorto sent at 6/5/2023  3:04 PM CDT -----  Chronic laryngitis [J37.0]  Chronic hoarseness [R49.0] wants appointment in [place] please call patient back to schedule

## 2023-06-06 ENCOUNTER — INFUSION (OUTPATIENT)
Dept: INFECTIOUS DISEASES | Facility: HOSPITAL | Age: 73
End: 2023-06-06
Attending: INTERNAL MEDICINE
Payer: MEDICARE

## 2023-06-06 ENCOUNTER — TELEPHONE (OUTPATIENT)
Dept: NEPHROLOGY | Facility: CLINIC | Age: 73
End: 2023-06-06
Payer: MEDICARE

## 2023-06-06 VITALS
OXYGEN SATURATION: 98 % | BODY MASS INDEX: 27.43 KG/M2 | SYSTOLIC BLOOD PRESSURE: 180 MMHG | HEIGHT: 74 IN | HEART RATE: 65 BPM | RESPIRATION RATE: 18 BRPM | WEIGHT: 213.75 LBS | TEMPERATURE: 99 F | DIASTOLIC BLOOD PRESSURE: 78 MMHG

## 2023-06-06 DIAGNOSIS — E11.22 TYPE 2 DIABETES MELLITUS WITH STAGE 3B CHRONIC KIDNEY DISEASE, WITHOUT LONG-TERM CURRENT USE OF INSULIN: ICD-10-CM

## 2023-06-06 DIAGNOSIS — N18.32 TYPE 2 DIABETES MELLITUS WITH STAGE 3B CHRONIC KIDNEY DISEASE, WITHOUT LONG-TERM CURRENT USE OF INSULIN: ICD-10-CM

## 2023-06-06 DIAGNOSIS — E86.0 DEHYDRATION: Primary | ICD-10-CM

## 2023-06-06 DIAGNOSIS — Z93.3 COLOSTOMY PRESENT: ICD-10-CM

## 2023-06-06 PROCEDURE — 25000003 PHARM REV CODE 250: Performed by: NURSE PRACTITIONER

## 2023-06-06 PROCEDURE — 96365 THER/PROPH/DIAG IV INF INIT: CPT

## 2023-06-06 RX ORDER — HEPARIN 100 UNIT/ML
500 SYRINGE INTRAVENOUS
Status: CANCELLED | OUTPATIENT
Start: 2023-06-09

## 2023-06-06 RX ORDER — SODIUM CHLORIDE 0.9 % (FLUSH) 0.9 %
10 SYRINGE (ML) INJECTION
Status: DISCONTINUED | OUTPATIENT
Start: 2023-06-06 | End: 2023-06-06 | Stop reason: HOSPADM

## 2023-06-06 RX ORDER — SODIUM CHLORIDE 0.9 % (FLUSH) 0.9 %
10 SYRINGE (ML) INJECTION
Status: CANCELLED | OUTPATIENT
Start: 2023-06-09

## 2023-06-06 RX ORDER — HEPARIN 100 UNIT/ML
500 SYRINGE INTRAVENOUS
Status: DISCONTINUED | OUTPATIENT
Start: 2023-06-06 | End: 2023-06-06 | Stop reason: HOSPADM

## 2023-06-06 RX ADMIN — SODIUM CHLORIDE 1000 ML: 9 INJECTION, SOLUTION INTRAVENOUS at 10:06

## 2023-06-06 NOTE — PROGRESS NOTES
Upon arrival and VS check, BP noted to be elevated, rechecked x 3 and unchanged. Allowed pt to sit and rest and rechecked several times over the next 45 minutes (see VS flowsheet). Contacted Dr. Marrufo at 1023 to update on pt status. MD requested he take his PRN hydral which pt stated he did take on arrival after first elevated reading. MD requested pt take second dose, pt did not have second dose.     Final BP check was 180/66 and MD confirmed it was OK to proceed with IVF therapy plan. Md requested that upon arrival home he retake dose and recheck BP and to call with reading. Instructed patient and he verbalized understanding.     Patient received 1 liter Normal Saline x 1hr, pt tolerated well & left in NAD, next appt made.     Discharge BP taken and communicated to Dr. Marrufo. See VS flowsheet.

## 2023-06-08 ENCOUNTER — CLINICAL SUPPORT (OUTPATIENT)
Dept: CARDIOLOGY | Facility: HOSPITAL | Age: 73
End: 2023-06-08
Payer: MEDICARE

## 2023-06-08 DIAGNOSIS — Z95.818 PRESENCE OF OTHER CARDIAC IMPLANTS AND GRAFTS: ICD-10-CM

## 2023-06-08 PROCEDURE — G2066 INTER DEVC REMOTE 30D: HCPCS | Performed by: INTERNAL MEDICINE

## 2023-06-08 PROCEDURE — 93298 REM INTERROG DEV EVAL SCRMS: CPT | Mod: ,,, | Performed by: INTERNAL MEDICINE

## 2023-06-08 PROCEDURE — 93298 CARDIAC DEVICE CHECK - REMOTE: ICD-10-PCS | Mod: ,,, | Performed by: INTERNAL MEDICINE

## 2023-06-09 ENCOUNTER — OFFICE VISIT (OUTPATIENT)
Dept: GASTROENTEROLOGY | Facility: CLINIC | Age: 73
End: 2023-06-09
Payer: MEDICARE

## 2023-06-09 ENCOUNTER — TELEPHONE (OUTPATIENT)
Dept: NEPHROLOGY | Facility: CLINIC | Age: 73
End: 2023-06-09
Payer: MEDICARE

## 2023-06-09 ENCOUNTER — INFUSION (OUTPATIENT)
Dept: INFECTIOUS DISEASES | Facility: HOSPITAL | Age: 73
End: 2023-06-09
Payer: MEDICARE

## 2023-06-09 VITALS — HEIGHT: 74 IN | BODY MASS INDEX: 28.03 KG/M2 | WEIGHT: 218.38 LBS

## 2023-06-09 VITALS
RESPIRATION RATE: 16 BRPM | DIASTOLIC BLOOD PRESSURE: 83 MMHG | HEART RATE: 74 BPM | SYSTOLIC BLOOD PRESSURE: 180 MMHG | TEMPERATURE: 99 F | OXYGEN SATURATION: 97 % | WEIGHT: 217.13 LBS | BODY MASS INDEX: 27.87 KG/M2 | HEIGHT: 74 IN

## 2023-06-09 DIAGNOSIS — Z93.3 COLOSTOMY PRESENT: ICD-10-CM

## 2023-06-09 DIAGNOSIS — E87.20 ACIDOSIS: Primary | ICD-10-CM

## 2023-06-09 DIAGNOSIS — E86.0 DEHYDRATION: Primary | ICD-10-CM

## 2023-06-09 DIAGNOSIS — R79.89 ELEVATED LFTS: Primary | ICD-10-CM

## 2023-06-09 DIAGNOSIS — Z09 HOSPITAL DISCHARGE FOLLOW-UP: ICD-10-CM

## 2023-06-09 DIAGNOSIS — N18.32 TYPE 2 DIABETES MELLITUS WITH STAGE 3B CHRONIC KIDNEY DISEASE, WITHOUT LONG-TERM CURRENT USE OF INSULIN: ICD-10-CM

## 2023-06-09 DIAGNOSIS — K52.9 CHRONIC DIARRHEA: ICD-10-CM

## 2023-06-09 DIAGNOSIS — E11.22 TYPE 2 DIABETES MELLITUS WITH STAGE 3B CHRONIC KIDNEY DISEASE, WITHOUT LONG-TERM CURRENT USE OF INSULIN: ICD-10-CM

## 2023-06-09 PROCEDURE — 1101F PT FALLS ASSESS-DOCD LE1/YR: CPT | Mod: CPTII,S$GLB,, | Performed by: NURSE PRACTITIONER

## 2023-06-09 PROCEDURE — 1126F AMNT PAIN NOTED NONE PRSNT: CPT | Mod: CPTII,S$GLB,, | Performed by: NURSE PRACTITIONER

## 2023-06-09 PROCEDURE — 3008F PR BODY MASS INDEX (BMI) DOCUMENTED: ICD-10-PCS | Mod: CPTII,S$GLB,, | Performed by: NURSE PRACTITIONER

## 2023-06-09 PROCEDURE — 3066F NEPHROPATHY DOC TX: CPT | Mod: CPTII,S$GLB,, | Performed by: NURSE PRACTITIONER

## 2023-06-09 PROCEDURE — 96360 HYDRATION IV INFUSION INIT: CPT

## 2023-06-09 PROCEDURE — 3066F PR DOCUMENTATION OF TREATMENT FOR NEPHROPATHY: ICD-10-PCS | Mod: CPTII,S$GLB,, | Performed by: NURSE PRACTITIONER

## 2023-06-09 PROCEDURE — 1101F PR PT FALLS ASSESS DOC 0-1 FALLS W/OUT INJ PAST YR: ICD-10-PCS | Mod: CPTII,S$GLB,, | Performed by: NURSE PRACTITIONER

## 2023-06-09 PROCEDURE — 1111F PR DISCHARGE MEDS RECONCILED W/ CURRENT OUTPATIENT MED LIST: ICD-10-PCS | Mod: CPTII,S$GLB,, | Performed by: NURSE PRACTITIONER

## 2023-06-09 PROCEDURE — 1159F MED LIST DOCD IN RCRD: CPT | Mod: CPTII,S$GLB,, | Performed by: NURSE PRACTITIONER

## 2023-06-09 PROCEDURE — 1111F DSCHRG MED/CURRENT MED MERGE: CPT | Mod: CPTII,S$GLB,, | Performed by: NURSE PRACTITIONER

## 2023-06-09 PROCEDURE — 3288F PR FALLS RISK ASSESSMENT DOCUMENTED: ICD-10-PCS | Mod: CPTII,S$GLB,, | Performed by: NURSE PRACTITIONER

## 2023-06-09 PROCEDURE — 1126F PR PAIN SEVERITY QUANTIFIED, NO PAIN PRESENT: ICD-10-PCS | Mod: CPTII,S$GLB,, | Performed by: NURSE PRACTITIONER

## 2023-06-09 PROCEDURE — 3044F HG A1C LEVEL LT 7.0%: CPT | Mod: CPTII,S$GLB,, | Performed by: NURSE PRACTITIONER

## 2023-06-09 PROCEDURE — 99214 PR OFFICE/OUTPT VISIT, EST, LEVL IV, 30-39 MIN: ICD-10-PCS | Mod: S$GLB,,, | Performed by: NURSE PRACTITIONER

## 2023-06-09 PROCEDURE — 99999 PR PBB SHADOW E&M-EST. PATIENT-LVL III: CPT | Mod: PBBFAC,,, | Performed by: NURSE PRACTITIONER

## 2023-06-09 PROCEDURE — 99214 OFFICE O/P EST MOD 30 MIN: CPT | Mod: S$GLB,,, | Performed by: NURSE PRACTITIONER

## 2023-06-09 PROCEDURE — 3288F FALL RISK ASSESSMENT DOCD: CPT | Mod: CPTII,S$GLB,, | Performed by: NURSE PRACTITIONER

## 2023-06-09 PROCEDURE — 3044F PR MOST RECENT HEMOGLOBIN A1C LEVEL <7.0%: ICD-10-PCS | Mod: CPTII,S$GLB,, | Performed by: NURSE PRACTITIONER

## 2023-06-09 PROCEDURE — 3008F BODY MASS INDEX DOCD: CPT | Mod: CPTII,S$GLB,, | Performed by: NURSE PRACTITIONER

## 2023-06-09 PROCEDURE — 1159F PR MEDICATION LIST DOCUMENTED IN MEDICAL RECORD: ICD-10-PCS | Mod: CPTII,S$GLB,, | Performed by: NURSE PRACTITIONER

## 2023-06-09 PROCEDURE — 25000003 PHARM REV CODE 250: Performed by: NURSE PRACTITIONER

## 2023-06-09 PROCEDURE — 99999 PR PBB SHADOW E&M-EST. PATIENT-LVL III: ICD-10-PCS | Mod: PBBFAC,,, | Performed by: NURSE PRACTITIONER

## 2023-06-09 RX ORDER — HEPARIN 100 UNIT/ML
500 SYRINGE INTRAVENOUS
Status: CANCELLED | OUTPATIENT
Start: 2023-06-13

## 2023-06-09 RX ORDER — SODIUM BICARBONATE 650 MG/1
650 TABLET ORAL DAILY
Qty: 90 TABLET | Refills: 3 | Status: SHIPPED | OUTPATIENT
Start: 2023-06-09 | End: 2023-08-01

## 2023-06-09 RX ORDER — SODIUM CHLORIDE 0.9 % (FLUSH) 0.9 %
10 SYRINGE (ML) INJECTION
Status: CANCELLED | OUTPATIENT
Start: 2023-06-13

## 2023-06-09 RX ORDER — SODIUM CHLORIDE 0.9 % (FLUSH) 0.9 %
10 SYRINGE (ML) INJECTION
Status: DISCONTINUED | OUTPATIENT
Start: 2023-06-09 | End: 2023-06-09 | Stop reason: HOSPADM

## 2023-06-09 RX ADMIN — SODIUM CHLORIDE 1000 ML: 9 INJECTION, SOLUTION INTRAVENOUS at 08:06

## 2023-06-09 NOTE — PROGRESS NOTES
GASTROENTEROLOGY CLINIC NOTE    Chief Complaint: The primary encounter diagnosis was Elevated LFTs. Diagnoses of Chronic diarrhea and Hospital discharge follow-up were also pertinent to this visit.  Referring provider/PCP: Poli Gonzalez MD    Jarrett Charlton Jr. is a 73 y.o. male who is a new patient to me with a PMH that's significant for Ulcerative Colitis, CKD, DM, and GERD and is accompanied by his niece.  He is here today to establish care for hospital follow up.  Patient transferred to [facility] for GI evaluation d/t upper abdominal pain, distension, and nausea.  Mild dilation noted on MRI.  Patient underwent EUS which dilated bile duct noted. He then underwent cholecystectomy.  He was subsequently discharged with instructions to follow up with GI.  Reports doing well.  Continues with minor RUQ pain but reports it is improving.  Bloating has also improved. No nausea, vomiting, fever, or chills noted. Ileostomy without increased output noted. Some formed stool noted. Taking Imodium as needed and daily Fiber supplement which helps control diarrhea.     Interval Note 6/9/2023  Mr. Jarrett Charlton Jr. Who is known to me presents for follow up regarding diarrhea and hospital follow up for elevated LFTs.  Recently hospitalized due to shortness of breath. During hospital stay LFTs noted to be elevated. Since discharge, LFTs trending downward. Most recent LFTs 5/26/2023 were WNL. Labs do not reveal any signs of obstruction of bile duct. Patient denies abdominal pain. Ileostomy intact. Taking questran TID which has helped significantly with output and stool consistency. Has had to supplement with Imodium once but otherwise doing well with questran. Continues with fiber supplement.     NSAIDs: ASA 81mg  Anticoagulation or Antiplatelet: No    History of H.pylori:  H.pylori Treatment:  Prior Upper Endoscopy:   Upper EUS: 2/2023 with Dr. Fuller  Impression:            - Normal esophagus.                          -  Normal stomach.                          - Duodenal diverticulum with the papilla deep                          within                          - There was dilation in the middle third of the                          main bile duct and in the upper third of the main                          bile duct which measured up to 10 mm tapering to                          the papilla without obstruction. Duodenal                          diverticula could have possible obscured the                          distal duct.                          - There was no sign of significant pathology in                          the entire pancreas.                          - There was no sign of significant pathology in                          the ampulla.                          - No specimens collected.     Recommendation:        - Return patient [facility] for [facility] care.                          - Resume previous diet.                          - Continue present medications.                          - Refer to a surgeon as previously scheduled.     Prior Colonoscopy:  Ileoscopy: 4/2022 with Dr. Juarez  Findings:        Patient is status-post total colectomy with an end ileostomy. The        stoma is healthy appearing, with a small lumen, which was unable to        be digitized or have PCF inserted through. As such scope was        exchanged for XP.        A large amount of stool was found in the distal ileum, making        visualization difficult.        There is no endoscopic evidence of stenosis, stricture or        ulcerations in the entire examined ileum. Exam performed up to 25cm        from stoma.     Impression:            - Preparation of the colon was poor.                          - Stool in the distal ileum.                          - Healthy appearing ileum     Recommendation:        - Return patient to [facility] for ongoing care.                          - Continue present diet                          -  Follow clinical course; could consider further                          small bowel imaging if symptoms remain     Family h/o Colon Cancer: No  Family h/o Crohn's Disease or Ulcerative Colitis: No  Family h/o Celiac Sprue: No  Abdominal Surgeries: Cholecystectomy 2023  total proctocolectomy with end ileostomy that was performed initially in 1985. 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      Review of Systems   Constitutional:  Negative for weight loss.   HENT:  Negative for sore throat.    Eyes:  Negative for blurred vision.   Respiratory:  Negative for cough.    Cardiovascular:  Negative for chest pain.   Gastrointestinal:  Negative for abdominal pain, blood in stool, constipation, diarrhea, heartburn, melena, nausea and vomiting.   Genitourinary:  Negative for dysuria.   Musculoskeletal:  Negative for myalgias.   Skin:  Negative for rash.   Neurological:  Negative for headaches.   Endo/Heme/Allergies:  Negative for environmental allergies.   Psychiatric/Behavioral:  Negative for suicidal ideas. The patient is not nervous/anxious.      Past Medical History: has a past medical history of Basal cell carcinoma, BPH (benign prostatic hyperplasia), Carotid stenosis, Chronic kidney disease, Claudication, Coronary artery disease, DDD (degenerative disc disease), Diabetes mellitus with renal complications, Disc disease, degenerative, cervical, Encounter for blood transfusion, GERD (gastroesophageal reflux disease), Gout, chronic, History of ulcerative colitis, HLD (hyperlipidemia), HTN (hypertension), Ileostomy in place, RBBB, Squamous cell carcinoma, Squamous cell carcinoma, and Ventricular tachycardia.    Past Surgical History: has a past surgical history that includes colectomy and ileostomy (1985); Cervical fusion; Cataract extraction (Bilateral); cardiac stents; Transurethral resection of prostate (TURP) without use of laser (N/A, 1/23/2019); repair,  hernia, parastomal (N/A, 11/9/2020); Lysis of adhesions (N/A, 11/9/2020); Excision of parotid gland (Left, 12/18/2020); Left heart catheterization (Right, 4/15/2021); Insertion of implantable loop recorder (06/07/2021); Insertion of implantable loop recorder (Left, 6/7/2021); Pouchoscopy (N/A, 4/6/2022); Endoscopic ultrasound of upper gastrointestinal tract (N/A, 2/10/2023); ERCP (N/A, 2/10/2023); Cholecystectomy (N/A, 2/14/2023); and Lysis of adhesions (N/A, 2/14/2023).    Family History:family history includes Cancer in his father; Dementia in his mother; Diabetes in his father.    Allergies:   Review of patient's allergies indicates:   Allergen Reactions    Crestor [rosuvastatin]     Lipitor [atorvastatin]        Social History: reports that he has never smoked. He has never used smokeless tobacco. He reports that he does not drink alcohol and does not use drugs.    Home medications:   Current Outpatient Medications on File Prior to Visit   Medication Sig Dispense Refill    ACCU-CHEK PAUL CONTROL SOLN Soln 1 drop by NOT APPLICABLE route once daily.      ACCU-CHEK SOFTCLIX LANCETS [facility] [place] BLOOD SUGAR THREE TIMES DAILY 300 each 3    allopurinoL (ZYLOPRIM) 300 MG tablet Take 1.5 tablets (450 mg total) by mouth once daily. 135 tablet 3    aspirin (ECOTRIN) 81 MG EC tablet Take 1 tablet (81 mg total) by mouth once daily. 30 tablet 1    BD ALCOHOL SWABS PadM Apply 1 each topically 5 (five) times daily. 300 each 2    blood sugar diagnostic (ONETOUCH ULTRA TEST) Str USE ONE STRIP TO CHECK GLUCOSE EVERY  each 11    blood-glucose meter (ACCU-CHEK GUIDE GLUCOSE METER) [facility] [place]ucheck BID 1 each 0    brimonidine 0.2% (ALPHAGAN) 0.2 % Drop Place 1 drop into both eyes 2 (two) times daily. 20 mL 0    calcium-vitamin D3 (OS-DAKOTA 500 + D3) 500 mg-5 mcg (200 unit) per tablet Take 1 tablet by mouth once daily. 30 tablet 1    cetirizine (ZYRTEC) 5 MG tablet Take 1 tablet (5 mg total) by mouth once daily. 30 tablet 1     "cholestyramine-aspartame (QUESTRAN LIGHT) 4 gram PwPk Take 1 packet (4 g total) by mouth 3 (three) times daily with meals. 270 packet 1    FIBER, CALCIUM POLYCARBOPHIL, 625 mg tablet Take 3 tablets by mouth before dinner.      fludrocortisone (FLORINEF) 0.1 mg Tab Take 1 tablet (100 mcg total) by mouth once daily. 180 tablet 0    gabapentin (NEURONTIN) 100 MG capsule Take 2 capsules (200 mg total) by mouth 3 (three) times daily. 180 capsule 1    glimepiride (AMARYL) 4 MG tablet Take 1 tablet (4 mg total) by mouth 2 (two) times daily before meals. 90 tablet 1    LIDOcaine (LIDODERM) 5 % Place 1 patch onto the skin as needed. Remove & Discard patch within 12 hours or as directed by MD 30 patch 1    magnesium oxide 400 mg magnesium Tab 1 po qdaily 90 tablet 3    metoprolol succinate (TOPROL-XL) 25 MG 24 hr tablet Take 1 tablet (25 mg total) by mouth once daily. 90 tablet 1    midodrine (PROAMATINE) 10 MG tablet Take 1 tablet (10 mg total) by mouth daily as needed (Hypotension). 30 tablet 0    oxybutynin (DITROPAN-XL) 10 MG 24 hr tablet Take 1 tablet (10 mg total) by mouth once daily. 30 tablet 1    pantoprazole (PROTONIX) 40 MG tablet Take 1 tablet (40 mg total) by mouth once daily. 90 tablet 0    pravastatin (PRAVACHOL) 40 MG tablet Take 1 tablet (40 mg total) by mouth once daily. 90 tablet 0     Current Facility-Administered Medications on File Prior to Visit   Medication Dose Route Frequency Provider Last Rate Last Admin    [COMPLETED] sodium chloride 0.9% bolus 1,000 mL 1,000 mL  1,000 mL Intravenous 1 time in Clinic/HOD Bianka Arevalo NP   Stopped at 06/09/23 0934    sodium chloride 0.9% in 1,000 mL infusion   Intravenous Continuous Paper Order        [DISCONTINUED] sodium chloride 0.9% flush 10 mL  10 mL Intravenous PRN Bianka Arevalo NP           Vital signs:  Ht 6' 2" (1.88 m)   Wt 99 kg (218 lb 5.9 oz)   BMI 28.04 kg/m²     Physical Exam  Vitals reviewed.   Constitutional:       General: He is " not in acute distress.     Appearance: Normal appearance. He is not ill-appearing.   HENT:      Head: Normocephalic.   Cardiovascular:      Rate and Rhythm: Normal rate and regular rhythm.      Heart sounds: Normal heart sounds. No murmur heard.  Pulmonary:      Effort: Pulmonary effort is normal. No respiratory distress.      Breath sounds: Normal breath sounds.   Chest:      Chest wall: No tenderness.   Abdominal:      General: Bowel sounds are normal. There is no distension.      Palpations: Abdomen is soft.      Tenderness: There is no abdominal tenderness. Negative signs include Bowers's sign.      Hernia: No hernia is present.      Comments: Ileostomy with intact bag. Stoma beefy red. Small amount formed stool and liquid noted in bag. No hematochezia. Abdominal Incision C/D/I.   Skin:     General: Skin is warm.   Neurological:      Mental Status: He is alert and oriented to person, place, and time.   Psychiatric:         Mood and Affect: Mood normal.         Behavior: Behavior normal.       Routine labs:  Lab Results   Component Value Date    WBC 10.52 05/26/2023    HGB 12.7 (L) 05/26/2023    HCT 41.0 05/26/2023    MCV 91 05/26/2023     05/26/2023     Lab Results   Component Value Date    INR 1.0 12/15/2022     Lab Results   Component Value Date    IRON 144 03/17/2008    FERRITIN 381.2 (H) 03/17/2008     Lab Results   Component Value Date     06/02/2023    K 4.8 06/02/2023     (H) 06/02/2023    CO2 17 (L) 06/02/2023    BUN 32 (H) 06/02/2023    CREATININE 2.7 (H) 06/02/2023     Lab Results   Component Value Date    ALBUMIN 3.5 05/26/2023    ALT 34 05/26/2023    AST 43 (H) 05/26/2023    ALKPHOS 197 (H) 05/26/2023    BILITOT 0.6 05/26/2023     No results found for: GLUCOSE  Lab Results   Component Value Date    TSH 1.827 05/26/2023     Lab Results   Component Value Date    CALCIUM 8.2 (L) 06/02/2023    PHOS 3.5 03/14/2023       Imaging:      I have reviewed prior labs, imaging, and  notes.      Assessment:  1. Elevated LFTs    2. Chronic diarrhea    3. Hospital discharge follow-up      LFTs have trended back down and are within range. No abdominal pain/discomfort, nausea, vomiting.   Ileostomy producing soft stool; output improved. Taking questran TID.       Plan:     Continue questran as previously prescribed  Continue Imodium PRN  Continue daily fiber.     Plan of care discussed with patient who is in agreement and verbalized understanding.     I have explained the planned procedures to the patient.The risks, benefits and alternatives of the procedure were also explained in detail. Patient verbalized understanding, all questions were answered. The patient agrees to proceed as planned      Follow Up: As Needed        Mia Montenegro, MEGHA,FNP-BC  [facility] [place]    (Portions of this note were dictated using voice recognition software and may contain dictation related errors in spelling/grammar/syntax not found on text review)

## 2023-06-09 NOTE — TELEPHONE ENCOUNTER
----- Message from Lexus Chavarria NP sent at 6/9/2023 10:33 AM CDT -----  Regarding: RE: Missed Call  Contact: [phone]  Hey, I gave him a call about his labs from last week. I would like to repeat a RFP in 2 weeks and have placed the order. Thanks!  ----- Message -----  From: Jeanie Gallagher MA  Sent: 6/8/2023   3:10 PM CDT  To: Lexus Chavarria NP  Subject: FW: Missed Call                                    ----- Message -----  From: Emy Asencio  Sent: 6/8/2023   2:42 PM CDT  To: [facility] Staff  Subject: Missed Call                                      Pt is calling in ref to missed call from provider. Patient Requesting Call Back @ [phone]

## 2023-06-09 NOTE — PROGRESS NOTES
Patient received 1 liter Normal Saline x 1hr, pt tolerated well & left in NAD, next appt made

## 2023-06-14 ENCOUNTER — TELEPHONE (OUTPATIENT)
Dept: PRIMARY CARE CLINIC | Facility: CLINIC | Age: 73
End: 2023-06-14
Payer: MEDICARE

## 2023-06-14 NOTE — TELEPHONE ENCOUNTER
"----- Message from Anette Stanton sent at 6/14/2023 10:17 AM CDT -----  Contact: ShiraDatezr [phone]  Patient would like to add the following to the previous orders that were sent on 3/16/2023    "Film Barrier Spray"    They will be sending revised orders to you today via Fax for approval.    Please call and advise.    Thank You      "

## 2023-06-15 ENCOUNTER — TELEPHONE (OUTPATIENT)
Dept: DERMATOLOGY | Facility: CLINIC | Age: 73
End: 2023-06-15
Payer: MEDICARE

## 2023-06-15 NOTE — TELEPHONE ENCOUNTER
No care due was identified.  [facility] Embedded Care Due Messages. Reference number: [id number].   6/15/2023 2:54:55 AM CDT

## 2023-06-16 ENCOUNTER — INFUSION (OUTPATIENT)
Dept: INFECTIOUS DISEASES | Facility: HOSPITAL | Age: 73
End: 2023-06-16
Attending: INTERNAL MEDICINE
Payer: MEDICARE

## 2023-06-16 ENCOUNTER — OFFICE VISIT (OUTPATIENT)
Dept: PRIMARY CARE CLINIC | Facility: CLINIC | Age: 73
End: 2023-06-16
Payer: MEDICARE

## 2023-06-16 VITALS
BODY MASS INDEX: 25.23 KG/M2 | OXYGEN SATURATION: 97 % | SYSTOLIC BLOOD PRESSURE: 175 MMHG | WEIGHT: 196.56 LBS | DIASTOLIC BLOOD PRESSURE: 87 MMHG | HEART RATE: 103 BPM

## 2023-06-16 VITALS
RESPIRATION RATE: 17 BRPM | HEIGHT: 74 IN | TEMPERATURE: 98 F | OXYGEN SATURATION: 96 % | WEIGHT: 213.63 LBS | DIASTOLIC BLOOD PRESSURE: 62 MMHG | SYSTOLIC BLOOD PRESSURE: 122 MMHG | HEART RATE: 63 BPM | BODY MASS INDEX: 27.42 KG/M2

## 2023-06-16 DIAGNOSIS — N18.32 TYPE 2 DIABETES MELLITUS WITH STAGE 3B CHRONIC KIDNEY DISEASE, WITHOUT LONG-TERM CURRENT USE OF INSULIN: ICD-10-CM

## 2023-06-16 DIAGNOSIS — E11.22 TYPE 2 DIABETES MELLITUS WITH STAGE 3B CHRONIC KIDNEY DISEASE, WITHOUT LONG-TERM CURRENT USE OF INSULIN: ICD-10-CM

## 2023-06-16 DIAGNOSIS — N18.32 CHRONIC RENAL IMPAIRMENT, STAGE 3B: ICD-10-CM

## 2023-06-16 DIAGNOSIS — Z93.3 COLOSTOMY PRESENT: ICD-10-CM

## 2023-06-16 DIAGNOSIS — E86.0 DEHYDRATION: Primary | ICD-10-CM

## 2023-06-16 DIAGNOSIS — M54.50 ACUTE RIGHT-SIDED LOW BACK PAIN WITHOUT SCIATICA: Primary | ICD-10-CM

## 2023-06-16 PROCEDURE — 1101F PR PT FALLS ASSESS DOC 0-1 FALLS W/OUT INJ PAST YR: ICD-10-PCS | Mod: CPTII,S$GLB,, | Performed by: INTERNAL MEDICINE

## 2023-06-16 PROCEDURE — 3044F HG A1C LEVEL LT 7.0%: CPT | Mod: CPTII,S$GLB,, | Performed by: INTERNAL MEDICINE

## 2023-06-16 PROCEDURE — 3066F NEPHROPATHY DOC TX: CPT | Mod: CPTII,S$GLB,, | Performed by: INTERNAL MEDICINE

## 2023-06-16 PROCEDURE — 3066F PR DOCUMENTATION OF TREATMENT FOR NEPHROPATHY: ICD-10-PCS | Mod: CPTII,S$GLB,, | Performed by: INTERNAL MEDICINE

## 2023-06-16 PROCEDURE — 1159F PR MEDICATION LIST DOCUMENTED IN MEDICAL RECORD: ICD-10-PCS | Mod: CPTII,S$GLB,, | Performed by: INTERNAL MEDICINE

## 2023-06-16 PROCEDURE — 25000003 PHARM REV CODE 250: Performed by: NURSE PRACTITIONER

## 2023-06-16 PROCEDURE — 99214 PR OFFICE/OUTPT VISIT, EST, LEVL IV, 30-39 MIN: ICD-10-PCS | Mod: 25,S$GLB,, | Performed by: INTERNAL MEDICINE

## 2023-06-16 PROCEDURE — 3078F PR MOST RECENT DIASTOLIC BLOOD PRESSURE < 80 MM HG: ICD-10-PCS | Mod: CPTII,S$GLB,, | Performed by: INTERNAL MEDICINE

## 2023-06-16 PROCEDURE — 1160F PR REVIEW ALL MEDS BY PRESCRIBER/CLIN PHARMACIST DOCUMENTED: ICD-10-PCS | Mod: CPTII,S$GLB,, | Performed by: INTERNAL MEDICINE

## 2023-06-16 PROCEDURE — 3008F PR BODY MASS INDEX (BMI) DOCUMENTED: ICD-10-PCS | Mod: CPTII,S$GLB,, | Performed by: INTERNAL MEDICINE

## 2023-06-16 PROCEDURE — 3008F BODY MASS INDEX DOCD: CPT | Mod: CPTII,S$GLB,, | Performed by: INTERNAL MEDICINE

## 2023-06-16 PROCEDURE — 3288F FALL RISK ASSESSMENT DOCD: CPT | Mod: CPTII,S$GLB,, | Performed by: INTERNAL MEDICINE

## 2023-06-16 PROCEDURE — 1101F PT FALLS ASSESS-DOCD LE1/YR: CPT | Mod: CPTII,S$GLB,, | Performed by: INTERNAL MEDICINE

## 2023-06-16 PROCEDURE — 96372 PR INJECTION,THERAP/PROPH/DIAG2ST, IM OR SUBCUT: ICD-10-PCS | Mod: S$GLB,,, | Performed by: INTERNAL MEDICINE

## 2023-06-16 PROCEDURE — 1125F PR PAIN SEVERITY QUANTIFIED, PAIN PRESENT: ICD-10-PCS | Mod: CPTII,S$GLB,, | Performed by: INTERNAL MEDICINE

## 2023-06-16 PROCEDURE — 3044F PR MOST RECENT HEMOGLOBIN A1C LEVEL <7.0%: ICD-10-PCS | Mod: CPTII,S$GLB,, | Performed by: INTERNAL MEDICINE

## 2023-06-16 PROCEDURE — 99999 PR PBB SHADOW E&M-EST. PATIENT-LVL V: ICD-10-PCS | Mod: PBBFAC,,, | Performed by: INTERNAL MEDICINE

## 2023-06-16 PROCEDURE — 1125F AMNT PAIN NOTED PAIN PRSNT: CPT | Mod: CPTII,S$GLB,, | Performed by: INTERNAL MEDICINE

## 2023-06-16 PROCEDURE — 3078F DIAST BP <80 MM HG: CPT | Mod: CPTII,S$GLB,, | Performed by: INTERNAL MEDICINE

## 2023-06-16 PROCEDURE — 96372 THER/PROPH/DIAG INJ SC/IM: CPT | Mod: S$GLB,,, | Performed by: INTERNAL MEDICINE

## 2023-06-16 PROCEDURE — 99214 OFFICE O/P EST MOD 30 MIN: CPT | Mod: 25,S$GLB,, | Performed by: INTERNAL MEDICINE

## 2023-06-16 PROCEDURE — 1159F MED LIST DOCD IN RCRD: CPT | Mod: CPTII,S$GLB,, | Performed by: INTERNAL MEDICINE

## 2023-06-16 PROCEDURE — 1111F DSCHRG MED/CURRENT MED MERGE: CPT | Mod: CPTII,S$GLB,, | Performed by: INTERNAL MEDICINE

## 2023-06-16 PROCEDURE — 99999 PR PBB SHADOW E&M-EST. PATIENT-LVL V: CPT | Mod: PBBFAC,,, | Performed by: INTERNAL MEDICINE

## 2023-06-16 PROCEDURE — 1111F PR DISCHARGE MEDS RECONCILED W/ CURRENT OUTPATIENT MED LIST: ICD-10-PCS | Mod: CPTII,S$GLB,, | Performed by: INTERNAL MEDICINE

## 2023-06-16 PROCEDURE — 3074F PR MOST RECENT SYSTOLIC BLOOD PRESSURE < 130 MM HG: ICD-10-PCS | Mod: CPTII,S$GLB,, | Performed by: INTERNAL MEDICINE

## 2023-06-16 PROCEDURE — 1160F RVW MEDS BY RX/DR IN RCRD: CPT | Mod: CPTII,S$GLB,, | Performed by: INTERNAL MEDICINE

## 2023-06-16 PROCEDURE — 3074F SYST BP LT 130 MM HG: CPT | Mod: CPTII,S$GLB,, | Performed by: INTERNAL MEDICINE

## 2023-06-16 PROCEDURE — 96360 HYDRATION IV INFUSION INIT: CPT

## 2023-06-16 PROCEDURE — 3288F PR FALLS RISK ASSESSMENT DOCUMENTED: ICD-10-PCS | Mod: CPTII,S$GLB,, | Performed by: INTERNAL MEDICINE

## 2023-06-16 RX ORDER — HEPARIN 100 UNIT/ML
500 SYRINGE INTRAVENOUS
Status: DISCONTINUED | OUTPATIENT
Start: 2023-06-16 | End: 2023-06-16 | Stop reason: HOSPADM

## 2023-06-16 RX ORDER — HEPARIN 100 UNIT/ML
500 SYRINGE INTRAVENOUS
Status: CANCELLED | OUTPATIENT
Start: 2023-06-20

## 2023-06-16 RX ORDER — ISOPROPYL ALCOHOL 70 ML/100ML
SWAB TOPICAL
Qty: 100 EACH | Refills: 5 | Status: SHIPPED | OUTPATIENT
Start: 2023-06-16 | End: 2023-08-23

## 2023-06-16 RX ORDER — OXYCODONE HYDROCHLORIDE 5 MG/1
5 TABLET ORAL EVERY 8 HOURS PRN
Qty: 25 TABLET | Refills: 0 | Status: SHIPPED | OUTPATIENT
Start: 2023-06-16 | End: 2023-08-01

## 2023-06-16 RX ORDER — GLIMEPIRIDE 4 MG/1
4 TABLET ORAL
Qty: 180 TABLET | Refills: 1 | Status: SHIPPED | OUTPATIENT
Start: 2023-06-16 | End: 2024-01-22

## 2023-06-16 RX ORDER — TRIAMCINOLONE ACETONIDE 40 MG/ML
60 INJECTION, SUSPENSION INTRA-ARTICULAR; INTRAMUSCULAR ONCE
Status: COMPLETED | OUTPATIENT
Start: 2023-06-16 | End: 2023-06-16

## 2023-06-16 RX ORDER — LIDOCAINE 50 MG/G
1 PATCH TOPICAL
Qty: 30 PATCH | Refills: 0 | Status: SHIPPED | OUTPATIENT
Start: 2023-06-16

## 2023-06-16 RX ORDER — SODIUM CHLORIDE 0.9 % (FLUSH) 0.9 %
10 SYRINGE (ML) INJECTION
Status: CANCELLED | OUTPATIENT
Start: 2023-06-20

## 2023-06-16 RX ORDER — SODIUM CHLORIDE 0.9 % (FLUSH) 0.9 %
10 SYRINGE (ML) INJECTION
Status: DISCONTINUED | OUTPATIENT
Start: 2023-06-16 | End: 2023-06-16 | Stop reason: HOSPADM

## 2023-06-16 RX ADMIN — SODIUM CHLORIDE 1000 ML: 9 INJECTION, SOLUTION INTRAVENOUS at 08:06

## 2023-06-16 RX ADMIN — TRIAMCINOLONE ACETONIDE 60 MG: 40 INJECTION, SUSPENSION INTRA-ARTICULAR; INTRAMUSCULAR at 12:06

## 2023-06-16 NOTE — PROGRESS NOTES
Patient received 1 liter Normal Saline x 1hr, pt tolerated well & left in NAD, next appt made

## 2023-06-16 NOTE — PROGRESS NOTES
Patient was Identified by name and . Allergies were reviewed. Administered Kenalog 60 mg IM using aseptic technique.

## 2023-06-16 NOTE — PROGRESS NOTES
Subjective:       Patient ID: Jarrett Charlton Jr. is a 73 y.o. male.    Chief Complaint: Back Pain (Increased pain for last week, mostly on Right side)    HPI   Pt with multipple medical conditions chronic dehydration fro high out put  ileostomy CRI anxiety depression gouty arthritis  HTN DM pt visit today c/o rt lower back pain for couple of weeks and getting worse no radiculopathy no dysuria hematuria no fever chill no n/v/d denies h/o kidney stones pt can't rest at night due to pain no weakness in legs no trauma  Review of Systems    Objective:      Physical Exam  Vitals and nursing note reviewed.   Constitutional:       General: He is not in acute distress.     Appearance: He is well-developed.   HENT:      Head: Normocephalic and atraumatic.      Right Ear: External ear normal.      Left Ear: External ear normal.      Nose: Nose normal.      Mouth/Throat:      Pharynx: No oropharyngeal exudate.   Eyes:      Extraocular Movements: Extraocular movements intact.      Conjunctiva/sclera: Conjunctivae normal.      Pupils: Pupils are equal, round, and reactive to light.   Neck:      Thyroid: No thyromegaly.   Cardiovascular:      Rate and Rhythm: Normal rate and regular rhythm.      Heart sounds: Normal heart sounds. No murmur heard.    No friction rub. No gallop.   Pulmonary:      Effort: Pulmonary effort is normal. No respiratory distress.      Breath sounds: Normal breath sounds. No wheezing.   Abdominal:      General: Bowel sounds are normal. There is no distension.      Palpations: Abdomen is soft.      Tenderness: There is no abdominal tenderness.   Musculoskeletal:         General: Tenderness (subjective tenderness along iliac crest rt lower back no skin rash no swelling no redness) present. No deformity. Normal range of motion.      Cervical back: Normal range of motion and neck supple.   Lymphadenopathy:      Cervical: No cervical adenopathy.   Skin:     General: Skin is warm and dry.      Findings: No  erythema or rash.   Neurological:      Mental Status: He is alert and oriented to person, place, and time.   Psychiatric:         Mood and Affect: Mood normal.         Thought Content: Thought content normal.         Judgment: Judgment normal.       Assessment:       1. Acute right-sided low back pain without sciatica    2. Type 2 diabetes mellitus with stage 3b chronic kidney disease, without long-term current use of insulin    3. Chronic renal impairment, stage 3b        Plan:       Acute right-sided low back pain without sciatica  Comments:  physical therapy if not better over weekend and xray U/A pt with CRI avid NSAIDS  Orders:  -     triamcinolone acetonide injection 60 mg  -     oxyCODONE (ROXICODONE) 5 MG immediate release tablet; Take 1 tablet (5 mg total) by mouth every 8 (eight) hours as needed for Pain.  Dispense: 25 tablet; Refill: 0  -     LIDOcaine (LIDODERM) 5 %; Place 1 patch onto the skin as needed. Remove & Discard patch within 12 hours or as directed by MD  Dispense: 30 patch; Refill: 0  -     X-Ray Lumbar Spine Ap And Lateral; Future; Expected date: 06/18/2023  -     Urinalysis; Future; Expected date: 06/18/2023    Type 2 diabetes mellitus with stage 3b chronic kidney disease, without long-term current use of insulin  Comments:  fairly controlled    Chronic renal impairment, stage 3b  Comments:  avoid NSAIDS        Medication List with Changes/Refills   New Medications    OXYCODONE (ROXICODONE) 5 MG IMMEDIATE RELEASE TABLET    Take 1 tablet (5 mg total) by mouth every 8 (eight) hours as needed for Pain.   Current Medications    ACCU-CHEK PAUL CONTROL SOLN SOLN    1 drop by NOT APPLICABLE route once daily.    ACCU-CHEK SOFTCLIX LANCETS MISC    TEST BLOOD SUGAR THREE TIMES DAILY    ALLOPURINOL (ZYLOPRIM) 300 MG TABLET    Take 1.5 tablets (450 mg total) by mouth once daily.    ASPIRIN (ECOTRIN) 81 MG EC TABLET    Take 1 tablet (81 mg total) by mouth once daily.    BLOOD SUGAR DIAGNOSTIC (ONETOUCH  ULTRA TEST) STRP    USE ONE STRIP TO CHECK GLUCOSE EVERY DAY    BLOOD-GLUCOSE METER (ACCU-CHEK GUIDE GLUCOSE METER) [facility] [place]    Accucheck BID    BRIMONIDINE 0.2% (ALPHAGAN) 0.2 % DROP    Place 1 drop into both eyes 2 (two) times daily.    CALCIUM-VITAMIN D3 (OS-DAKOTA 500 + D3) 500 MG-5 MCG (200 UNIT) PER TABLET    Take 1 tablet by mouth once daily.    CETIRIZINE (ZYRTEC) 5 MG TABLET    Take 1 tablet (5 mg total) by mouth once daily.    CHOLESTYRAMINE-ASPARTAME (QUESTRAN LIGHT) 4 GRAM PWPK    Take 1 packet (4 g total) by mouth 3 (three) times daily with meals.    DROPSAFE ALCOHOL PREP PADS PADM    USE TOPICALLY 5 (FIVE) TIMES DAILY.    FIBER, CALCIUM POLYCARBOPHIL, 625 MG TABLET    Take 3 tablets by mouth before dinner.    FLUDROCORTISONE (FLORINEF) 0.1 MG TAB    Take 1 tablet (100 mcg total) by mouth once daily.    GABAPENTIN (NEURONTIN) 100 MG CAPSULE    Take 2 capsules (200 mg total) by mouth 3 (three) times daily.    GLIMEPIRIDE (AMARYL) 4 MG TABLET    TAKE 1 TABLET (4 MG TOTAL) BY MOUTH 2 (TWO) TIMES DAILY BEFORE MEALS.    MAGNESIUM OXIDE 400 MG MAGNESIUM TAB    1 po qdaily    METOPROLOL SUCCINATE (TOPROL-XL) 25 MG 24 HR TABLET    Take 1 tablet (25 mg total) by mouth once daily.    MIDODRINE (PROAMATINE) 10 MG TABLET    Take 1 tablet (10 mg total) by mouth daily as needed (Hypotension).    OXYBUTYNIN (DITROPAN-XL) 10 MG 24 HR TABLET    Take 1 tablet (10 mg total) by mouth once daily.    PANTOPRAZOLE (PROTONIX) 40 MG TABLET    Take 1 tablet (40 mg total) by mouth once daily.    PRAVASTATIN (PRAVACHOL) 40 MG TABLET    Take 1 tablet (40 mg total) by mouth once daily.    SODIUM BICARBONATE 650 MG TABLET    Take 1 tablet (650 mg total) by mouth once daily.   Changed and/or Refilled Medications    Modified Medication Previous Medication    LIDOCAINE (LIDODERM) 5 % LIDOcaine (LIDODERM) 5 %       Place 1 patch onto the skin as needed. Remove & Discard patch within 12 hours or as directed by MD    Place 1 patch onto the skin  as needed. Remove & Discard patch within 12 hours or as directed by MD

## 2023-06-19 ENCOUNTER — TELEPHONE (OUTPATIENT)
Dept: PRIMARY CARE CLINIC | Facility: CLINIC | Age: 73
End: 2023-06-19
Payer: MEDICARE

## 2023-06-19 NOTE — TELEPHONE ENCOUNTER
----- Message from Lauren Arguello sent at 6/19/2023 11:46 AM CDT -----  Contact: [email or web address] [phone]  Cherrie is calling in regards to an order that was faxed over on the 14th. Pt called back and would like to add another item to the orders.  She is looking to get the orders back by the end of day. Their fax number is [phone]. Pt is now asking for wipes as well.              Thank you

## 2023-06-20 ENCOUNTER — INFUSION (OUTPATIENT)
Dept: INFECTIOUS DISEASES | Facility: HOSPITAL | Age: 73
End: 2023-06-20
Payer: MEDICARE

## 2023-06-20 VITALS
DIASTOLIC BLOOD PRESSURE: 86 MMHG | TEMPERATURE: 98 F | OXYGEN SATURATION: 98 % | BODY MASS INDEX: 27.19 KG/M2 | WEIGHT: 211.75 LBS | HEART RATE: 78 BPM | SYSTOLIC BLOOD PRESSURE: 162 MMHG

## 2023-06-20 DIAGNOSIS — Z93.3 COLOSTOMY PRESENT: ICD-10-CM

## 2023-06-20 DIAGNOSIS — N18.32 TYPE 2 DIABETES MELLITUS WITH STAGE 3B CHRONIC KIDNEY DISEASE, WITHOUT LONG-TERM CURRENT USE OF INSULIN: ICD-10-CM

## 2023-06-20 DIAGNOSIS — E11.22 TYPE 2 DIABETES MELLITUS WITH STAGE 3B CHRONIC KIDNEY DISEASE, WITHOUT LONG-TERM CURRENT USE OF INSULIN: ICD-10-CM

## 2023-06-20 DIAGNOSIS — E86.0 DEHYDRATION: Primary | ICD-10-CM

## 2023-06-20 PROCEDURE — 25000003 PHARM REV CODE 250: Performed by: NURSE PRACTITIONER

## 2023-06-20 PROCEDURE — 96360 HYDRATION IV INFUSION INIT: CPT

## 2023-06-20 RX ORDER — SODIUM CHLORIDE 0.9 % (FLUSH) 0.9 %
10 SYRINGE (ML) INJECTION
Status: CANCELLED | OUTPATIENT
Start: 2023-06-23

## 2023-06-20 RX ORDER — HEPARIN 100 UNIT/ML
500 SYRINGE INTRAVENOUS
Status: CANCELLED | OUTPATIENT
Start: 2023-06-23

## 2023-06-20 RX ORDER — HEPARIN 100 UNIT/ML
500 SYRINGE INTRAVENOUS
Status: DISCONTINUED | OUTPATIENT
Start: 2023-06-20 | End: 2023-06-20 | Stop reason: HOSPADM

## 2023-06-20 RX ORDER — SODIUM CHLORIDE 0.9 % (FLUSH) 0.9 %
10 SYRINGE (ML) INJECTION
Status: DISCONTINUED | OUTPATIENT
Start: 2023-06-20 | End: 2023-06-20 | Stop reason: HOSPADM

## 2023-06-20 RX ADMIN — SODIUM CHLORIDE 1000 ML: 9 INJECTION, SOLUTION INTRAVENOUS at 08:06

## 2023-06-20 NOTE — PROGRESS NOTES
Patient received 1 liter Normal Saline x 1hr, pt tolerated well & left in NAD, next appt made

## 2023-06-22 ENCOUNTER — TELEPHONE (OUTPATIENT)
Dept: NEPHROLOGY | Facility: CLINIC | Age: 73
End: 2023-06-22
Payer: MEDICARE

## 2023-06-22 ENCOUNTER — TELEPHONE (OUTPATIENT)
Dept: PRIMARY CARE CLINIC | Facility: CLINIC | Age: 73
End: 2023-06-22
Payer: MEDICARE

## 2023-06-22 ENCOUNTER — INFUSION (OUTPATIENT)
Dept: INFECTIOUS DISEASES | Facility: HOSPITAL | Age: 73
End: 2023-06-22
Payer: MEDICARE

## 2023-06-22 VITALS
OXYGEN SATURATION: 99 % | HEIGHT: 74 IN | HEART RATE: 68 BPM | SYSTOLIC BLOOD PRESSURE: 168 MMHG | TEMPERATURE: 99 F | WEIGHT: 213.38 LBS | RESPIRATION RATE: 16 BRPM | DIASTOLIC BLOOD PRESSURE: 89 MMHG | BODY MASS INDEX: 27.39 KG/M2

## 2023-06-22 DIAGNOSIS — E11.22 TYPE 2 DIABETES MELLITUS WITH STAGE 3B CHRONIC KIDNEY DISEASE, WITHOUT LONG-TERM CURRENT USE OF INSULIN: ICD-10-CM

## 2023-06-22 DIAGNOSIS — Z93.3 COLOSTOMY PRESENT: ICD-10-CM

## 2023-06-22 DIAGNOSIS — N18.32 TYPE 2 DIABETES MELLITUS WITH STAGE 3B CHRONIC KIDNEY DISEASE, WITHOUT LONG-TERM CURRENT USE OF INSULIN: ICD-10-CM

## 2023-06-22 DIAGNOSIS — E86.0 DEHYDRATION: Primary | ICD-10-CM

## 2023-06-22 PROCEDURE — 96360 HYDRATION IV INFUSION INIT: CPT

## 2023-06-22 PROCEDURE — 25000003 PHARM REV CODE 250: Performed by: NURSE PRACTITIONER

## 2023-06-22 RX ORDER — HEPARIN 100 UNIT/ML
500 SYRINGE INTRAVENOUS
Status: DISCONTINUED | OUTPATIENT
Start: 2023-06-22 | End: 2023-06-22 | Stop reason: HOSPADM

## 2023-06-22 RX ORDER — SODIUM CHLORIDE 0.9 % (FLUSH) 0.9 %
10 SYRINGE (ML) INJECTION
Status: CANCELLED | OUTPATIENT
Start: 2023-06-23

## 2023-06-22 RX ORDER — HEPARIN 100 UNIT/ML
500 SYRINGE INTRAVENOUS
Status: CANCELLED | OUTPATIENT
Start: 2023-06-23

## 2023-06-22 RX ORDER — SODIUM CHLORIDE 0.9 % (FLUSH) 0.9 %
10 SYRINGE (ML) INJECTION
Status: DISCONTINUED | OUTPATIENT
Start: 2023-06-22 | End: 2023-06-22 | Stop reason: HOSPADM

## 2023-06-22 RX ADMIN — SODIUM CHLORIDE 1000 ML: 9 INJECTION, SOLUTION INTRAVENOUS at 08:06

## 2023-06-22 NOTE — TELEPHONE ENCOUNTER
Called patient to inform him that the orders that are placed he will need to get the lipid panel and a1c done. As well as the urinalysis. Patient verbalized understanding and said that he will go Monday.

## 2023-06-22 NOTE — TELEPHONE ENCOUNTER
Repeat labs show improvement in eGFR to 30. Bicarb WNL on NaBicarb 650 mg qd. Instructed to continue. Will plan for follow-up in clinic in September with Bianka Arevalo NP. Patient verbalized understanding.

## 2023-06-22 NOTE — PROGRESS NOTES
Patient received 1 liter Normal Saline x 1hr, pt tolerated well & left in NAD, next appt made

## 2023-06-22 NOTE — TELEPHONE ENCOUNTER
----- Message from Hue Simmons sent at 6/22/2023  1:20 PM CDT -----  Contact: [employer] [phone]  Patient said that he went to have blood work done on this morning and he was told that you have ordered blood work for him to, and he would like to know if he still should have the blood test that you ordered for him done?

## 2023-06-27 ENCOUNTER — INFUSION (OUTPATIENT)
Dept: INFECTIOUS DISEASES | Facility: HOSPITAL | Age: 73
End: 2023-06-27
Payer: MEDICARE

## 2023-06-27 VITALS
OXYGEN SATURATION: 98 % | HEIGHT: 74 IN | BODY MASS INDEX: 27.09 KG/M2 | HEART RATE: 60 BPM | DIASTOLIC BLOOD PRESSURE: 83 MMHG | TEMPERATURE: 98 F | WEIGHT: 211.06 LBS | SYSTOLIC BLOOD PRESSURE: 179 MMHG | RESPIRATION RATE: 16 BRPM

## 2023-06-27 DIAGNOSIS — Z93.3 COLOSTOMY PRESENT: ICD-10-CM

## 2023-06-27 DIAGNOSIS — E86.0 DEHYDRATION: Primary | ICD-10-CM

## 2023-06-27 DIAGNOSIS — E11.22 TYPE 2 DIABETES MELLITUS WITH STAGE 3B CHRONIC KIDNEY DISEASE, WITHOUT LONG-TERM CURRENT USE OF INSULIN: ICD-10-CM

## 2023-06-27 DIAGNOSIS — N18.32 TYPE 2 DIABETES MELLITUS WITH STAGE 3B CHRONIC KIDNEY DISEASE, WITHOUT LONG-TERM CURRENT USE OF INSULIN: ICD-10-CM

## 2023-06-27 PROCEDURE — 96360 HYDRATION IV INFUSION INIT: CPT | Mod: HCNC

## 2023-06-27 PROCEDURE — 25000003 PHARM REV CODE 250: Mod: HCNC | Performed by: NURSE PRACTITIONER

## 2023-06-27 RX ORDER — HEPARIN 100 UNIT/ML
500 SYRINGE INTRAVENOUS
Status: CANCELLED | OUTPATIENT
Start: 2023-06-30

## 2023-06-27 RX ORDER — SODIUM CHLORIDE 0.9 % (FLUSH) 0.9 %
10 SYRINGE (ML) INJECTION
Status: CANCELLED | OUTPATIENT
Start: 2023-06-30

## 2023-06-27 RX ADMIN — SODIUM CHLORIDE 1000 ML: 9 INJECTION, SOLUTION INTRAVENOUS at 08:06

## 2023-06-27 NOTE — PROGRESS NOTES
Patient received 1 liter Normal Saline x 1hr, pt tolerated well & left in NAD, next appt made

## 2023-06-28 DIAGNOSIS — N18.32 CHRONIC RENAL IMPAIRMENT, STAGE 3B: Primary | ICD-10-CM

## 2023-06-29 ENCOUNTER — TELEPHONE (OUTPATIENT)
Dept: PRIMARY CARE CLINIC | Facility: CLINIC | Age: 73
End: 2023-06-29
Payer: MEDICARE

## 2023-06-29 NOTE — TELEPHONE ENCOUNTER
Contacted patient to give blood work results. Patient stated he just walked into the house and made it to the couch. Patient stated his BP was 80/55. Patient reported taking 10mg Midodrine at 0500 and 1045 and BP not increasing. Put patient on brief hold to discuss urgent situation with MD. Dr. Gonzalez recommended patient go to the ER for fluids due to hypotension and recent blood work indicating dehydration. Nurse notified patient that MD recommended ER visit so he did not pass out at home alone. Patient stated he was feeling that way now. Patient stated he was going to drive himself to the ER. Nurse strongly advised patient to not drive himself due to him barely being able to make it to the couch. Patient was adament about driving himself. Encouraged patient to be careful.

## 2023-06-30 ENCOUNTER — INFUSION (OUTPATIENT)
Dept: INFECTIOUS DISEASES | Facility: HOSPITAL | Age: 73
End: 2023-06-30
Payer: MEDICARE

## 2023-06-30 VITALS
BODY MASS INDEX: 27.12 KG/M2 | DIASTOLIC BLOOD PRESSURE: 95 MMHG | HEART RATE: 77 BPM | OXYGEN SATURATION: 99 % | TEMPERATURE: 98 F | SYSTOLIC BLOOD PRESSURE: 175 MMHG | WEIGHT: 211.19 LBS

## 2023-06-30 DIAGNOSIS — E86.0 DEHYDRATION: Primary | ICD-10-CM

## 2023-06-30 DIAGNOSIS — E11.22 TYPE 2 DIABETES MELLITUS WITH STAGE 3B CHRONIC KIDNEY DISEASE, WITHOUT LONG-TERM CURRENT USE OF INSULIN: ICD-10-CM

## 2023-06-30 DIAGNOSIS — N18.32 TYPE 2 DIABETES MELLITUS WITH STAGE 3B CHRONIC KIDNEY DISEASE, WITHOUT LONG-TERM CURRENT USE OF INSULIN: ICD-10-CM

## 2023-06-30 DIAGNOSIS — Z93.3 COLOSTOMY PRESENT: ICD-10-CM

## 2023-06-30 PROCEDURE — 25000003 PHARM REV CODE 250: Mod: HCNC | Performed by: NURSE PRACTITIONER

## 2023-06-30 PROCEDURE — 96360 HYDRATION IV INFUSION INIT: CPT | Mod: HCNC

## 2023-06-30 RX ORDER — HEPARIN 100 UNIT/ML
500 SYRINGE INTRAVENOUS
Status: DISCONTINUED | OUTPATIENT
Start: 2023-06-30 | End: 2023-06-30 | Stop reason: HOSPADM

## 2023-06-30 RX ORDER — SODIUM CHLORIDE 0.9 % (FLUSH) 0.9 %
10 SYRINGE (ML) INJECTION
Status: DISCONTINUED | OUTPATIENT
Start: 2023-06-30 | End: 2023-06-30 | Stop reason: HOSPADM

## 2023-06-30 RX ORDER — SODIUM CHLORIDE 0.9 % (FLUSH) 0.9 %
10 SYRINGE (ML) INJECTION
Status: CANCELLED | OUTPATIENT
Start: 2023-07-04

## 2023-06-30 RX ORDER — HEPARIN 100 UNIT/ML
500 SYRINGE INTRAVENOUS
Status: CANCELLED | OUTPATIENT
Start: 2023-07-04

## 2023-06-30 RX ADMIN — SODIUM CHLORIDE 1000 ML: 9 INJECTION, SOLUTION INTRAVENOUS at 08:06

## 2023-06-30 NOTE — PROGRESS NOTES
Patient received 1 liter Normal Saline x 1hr, pt tolerated well & left in NAD, next appt made

## 2023-07-05 ENCOUNTER — TELEPHONE (OUTPATIENT)
Dept: NEPHROLOGY | Facility: CLINIC | Age: 73
End: 2023-07-05
Payer: MEDICARE

## 2023-07-05 ENCOUNTER — INFUSION (OUTPATIENT)
Dept: INFECTIOUS DISEASES | Facility: HOSPITAL | Age: 73
End: 2023-07-05
Payer: MEDICARE

## 2023-07-05 VITALS
TEMPERATURE: 98 F | BODY MASS INDEX: 27.1 KG/M2 | DIASTOLIC BLOOD PRESSURE: 95 MMHG | SYSTOLIC BLOOD PRESSURE: 206 MMHG | OXYGEN SATURATION: 98 % | RESPIRATION RATE: 18 BRPM | HEIGHT: 74 IN | WEIGHT: 211.19 LBS | HEART RATE: 58 BPM

## 2023-07-05 DIAGNOSIS — E11.22 TYPE 2 DIABETES MELLITUS WITH STAGE 3B CHRONIC KIDNEY DISEASE, WITHOUT LONG-TERM CURRENT USE OF INSULIN: ICD-10-CM

## 2023-07-05 DIAGNOSIS — E86.0 DEHYDRATION: Primary | ICD-10-CM

## 2023-07-05 DIAGNOSIS — N18.32 TYPE 2 DIABETES MELLITUS WITH STAGE 3B CHRONIC KIDNEY DISEASE, WITHOUT LONG-TERM CURRENT USE OF INSULIN: ICD-10-CM

## 2023-07-05 DIAGNOSIS — Z93.3 COLOSTOMY PRESENT: ICD-10-CM

## 2023-07-05 PROCEDURE — 96361 HYDRATE IV INFUSION ADD-ON: CPT | Mod: HCNC

## 2023-07-05 PROCEDURE — 96360 HYDRATION IV INFUSION INIT: CPT | Mod: HCNC

## 2023-07-05 PROCEDURE — 25000003 PHARM REV CODE 250: Mod: HCNC | Performed by: NURSE PRACTITIONER

## 2023-07-05 RX ORDER — HEPARIN 100 UNIT/ML
500 SYRINGE INTRAVENOUS
Status: DISCONTINUED | OUTPATIENT
Start: 2023-07-05 | End: 2023-07-05 | Stop reason: HOSPADM

## 2023-07-05 RX ORDER — SODIUM CHLORIDE 0.9 % (FLUSH) 0.9 %
10 SYRINGE (ML) INJECTION
Status: CANCELLED | OUTPATIENT
Start: 2023-07-07

## 2023-07-05 RX ORDER — HEPARIN 100 UNIT/ML
500 SYRINGE INTRAVENOUS
Status: CANCELLED | OUTPATIENT
Start: 2023-07-07

## 2023-07-05 RX ORDER — SODIUM CHLORIDE 0.9 % (FLUSH) 0.9 %
10 SYRINGE (ML) INJECTION
Status: DISCONTINUED | OUTPATIENT
Start: 2023-07-05 | End: 2023-07-05 | Stop reason: HOSPADM

## 2023-07-05 RX ADMIN — SODIUM CHLORIDE 1000 ML: 9 INJECTION, SOLUTION INTRAVENOUS at 09:07

## 2023-07-05 NOTE — TELEPHONE ENCOUNTER
"Spoke with pt to schedule for ESRD tx choices class. Pt declined the class at this time, stating that his kidneys are "not that bad yet" and he would like to wait. Order cancelled and NICKY Arevalo notified.  "

## 2023-07-05 NOTE — TELEPHONE ENCOUNTER
----- Message from Elsy Hines RN sent at 7/5/2023  8:45 AM CDT -----  Regarding: HBP  Ms MELANIE Morocho NP,  Hi I know Dr. Cotto is on maternity leave, but I have Mr Charlton here for NS infusion. I can't get you on secure chat. His BP-222/98 manual, he just took his meds @ 0815, asymptomatic, will retake @ 0900, is it okay to infuse this am?

## 2023-07-05 NOTE — PROGRESS NOTES
Pt arrived to infusion unit this am for NS x 1 liter over 1 hr, pt has HBP, see note below:  Message to MELANIE Chavarria NP:  Hi I know Dr. Cotto is on maternity leave, but I have Mr Charlton here for NS infusion. I can't get you on secure chat. His BP-222/98 manual, he just took his meds @ 0815, asymptomatic, will retake @ 0900, is it okay to infuse this am?  Message to NICKY Chavarria:  Just took again BP- 218/98, I know will come down slow with meds he took at 0815, please let me know if it's okay to give NS over 1 hr or 2 hrs, he's refusing to go to ED.  He does drive from [facility], thanks  0924  Message from NICKY Chavarria:  Can infuse slowly over 2 hours and continue to monitor his blood pressure closely. If continues to remain that elevated I would continue to encourage ED evaluation. Thanks.

## 2023-07-05 NOTE — TELEPHONE ENCOUNTER
----- Message from Doreen Mclain sent at 7/5/2023  1:41 PM CDT -----  Regarding: appt access  Contact: pt [phone]  Pt is calling to speak with someone in provider office in regards to getting appt with Provider pt stated he normally speaks with NP Lexus pt  is asking for a return call please call pt at  [phone]

## 2023-07-06 ENCOUNTER — TELEPHONE (OUTPATIENT)
Dept: CARDIOLOGY | Facility: CLINIC | Age: 73
End: 2023-07-06
Payer: MEDICARE

## 2023-07-06 NOTE — TELEPHONE ENCOUNTER
----- Message from Martha Gonzalez sent at 7/6/2023  1:29 PM CDT -----  Regarding: Speak to staff  Contact: Jarrett Stroud is calling to speak to staff in regards to low pressure. Please advise. Requesting a call back.      [phone] (home)

## 2023-07-06 NOTE — TELEPHONE ENCOUNTER
Patient calling reports low blood pressure 79/57 now with symptoms. He reports he does not feel right,weak, dizzy and fatigue. Advised to go to ER as he is symptomatic with his low blood pressure.. verbalized understanding

## 2023-07-07 DIAGNOSIS — N17.9 AKI (ACUTE KIDNEY INJURY): Primary | ICD-10-CM

## 2023-07-07 NOTE — TELEPHONE ENCOUNTER
----- Message from Grisel Marroquin sent at 7/7/2023  8:10 AM CDT -----  Contact: pt [phone]  Pt requesting urgent  call back RE: would like to discuss his blood pressure, it is low.      Confirmed contact below:  Contact Name:Jarrett Charlton  Phone Number: [phone]

## 2023-07-07 NOTE — TELEPHONE ENCOUNTER
----- Message from Jorge A Boudreaux sent at 7/7/2023 12:26 PM CDT -----  Regarding: advise; low blood pressure  Contact: PT @ [phone]  Pt is calling again asking to speak with someone right away in the office. Pt states that he called this morning and is currently awaiting a return call. Pt states that his blood pressure an hour ago was reading, 62/46 and not is is currently in the 70s.    Pt states that his rxs is not helping to bring his blood pressure up. Pt is very concerned and states that he has been to the ER 3 times and just got out of the ER last night. Pt is asking to please call. Thanks.

## 2023-07-07 NOTE — PROGRESS NOTES
Patient is still having large fluctuations in blood pressure prompting ER evaluation yesterday 7/6. Recent lowest noted home blood pressure 64/46. BP ranging in the 200s at the infusion center for his last NS infusion that resolved when he returned home. Noted worsening sCr to 3.0 on last labs. He has increased his PO fluid intake.    Instructed to hold AM midodrine dose prior to infusions. Will repeat BMP on Wednesday 7/12. Continue increased PO fluid intake. Plan to discuss further with attending physician in clinic.

## 2023-07-07 NOTE — TELEPHONE ENCOUNTER
Spoke with patient, reported his blood pressure this morning is 64/49 Pulse 70.  Went to ER last night, still BP bottoms out.  Has went to ER 3 times this month. Patient asking for a call back to advise.

## 2023-07-07 NOTE — TELEPHONE ENCOUNTER
Spoke with patient.   Informed Dr. Marrufo is out of office today, NP Mabel Menendez suggest wearing Danie hose and abdominal binder.  Patient states he wore danie hose years ago and felt they made his legs swell more, and he can not wear anything on back or abdomen due to ileostomy bag.  Reports he is taking Toprol and Nifedipine as ordered.  Most current BP reading today is 62/40, feels dizzy, tired and short of breath.  Advised to go to ED, patient refused states he has been 3 times within a month.  Currently trying to change his flat tire as we are speaking.  Patient states he is frustrated that he is not getting any help to raise his blood pressure and hangs up phone.

## 2023-07-08 ENCOUNTER — CLINICAL SUPPORT (OUTPATIENT)
Dept: CARDIOLOGY | Facility: HOSPITAL | Age: 73
End: 2023-07-08
Payer: MEDICARE

## 2023-07-08 DIAGNOSIS — Z95.818 PRESENCE OF OTHER CARDIAC IMPLANTS AND GRAFTS: ICD-10-CM

## 2023-07-08 PROCEDURE — G2066 INTER DEVC REMOTE 30D: HCPCS | Mod: HCNC | Performed by: INTERNAL MEDICINE

## 2023-07-10 DIAGNOSIS — M25.572 CHRONIC PAIN OF LEFT ANKLE: Primary | ICD-10-CM

## 2023-07-10 DIAGNOSIS — G89.29 CHRONIC PAIN OF LEFT ANKLE: Primary | ICD-10-CM

## 2023-07-11 ENCOUNTER — INFUSION (OUTPATIENT)
Dept: INFECTIOUS DISEASES | Facility: HOSPITAL | Age: 73
End: 2023-07-11
Payer: MEDICARE

## 2023-07-11 VITALS
HEART RATE: 68 BPM | WEIGHT: 191.69 LBS | BODY MASS INDEX: 24.61 KG/M2 | RESPIRATION RATE: 18 BRPM | TEMPERATURE: 98 F | OXYGEN SATURATION: 95 % | SYSTOLIC BLOOD PRESSURE: 169 MMHG | DIASTOLIC BLOOD PRESSURE: 69 MMHG

## 2023-07-11 DIAGNOSIS — Z93.3 COLOSTOMY PRESENT: ICD-10-CM

## 2023-07-11 DIAGNOSIS — E11.22 TYPE 2 DIABETES MELLITUS WITH STAGE 3B CHRONIC KIDNEY DISEASE, WITHOUT LONG-TERM CURRENT USE OF INSULIN: ICD-10-CM

## 2023-07-11 DIAGNOSIS — N18.32 TYPE 2 DIABETES MELLITUS WITH STAGE 3B CHRONIC KIDNEY DISEASE, WITHOUT LONG-TERM CURRENT USE OF INSULIN: ICD-10-CM

## 2023-07-11 DIAGNOSIS — E86.0 DEHYDRATION: Primary | ICD-10-CM

## 2023-07-11 PROCEDURE — 25000003 PHARM REV CODE 250: Mod: HCNC | Performed by: NURSE PRACTITIONER

## 2023-07-11 PROCEDURE — 96360 HYDRATION IV INFUSION INIT: CPT | Mod: HCNC

## 2023-07-11 RX ORDER — SODIUM CHLORIDE 0.9 % (FLUSH) 0.9 %
10 SYRINGE (ML) INJECTION
Status: CANCELLED | OUTPATIENT
Start: 2023-07-14

## 2023-07-11 RX ORDER — SODIUM CHLORIDE 0.9 % (FLUSH) 0.9 %
10 SYRINGE (ML) INJECTION
Status: DISCONTINUED | OUTPATIENT
Start: 2023-07-11 | End: 2023-07-11 | Stop reason: HOSPADM

## 2023-07-11 RX ORDER — HEPARIN 100 UNIT/ML
500 SYRINGE INTRAVENOUS
Status: CANCELLED | OUTPATIENT
Start: 2023-07-14

## 2023-07-11 RX ADMIN — SODIUM CHLORIDE 1000 ML: 9 INJECTION, SOLUTION INTRAVENOUS at 08:07

## 2023-07-11 NOTE — TELEPHONE ENCOUNTER
Spoke with patient, he's not going to make follow up appointment today due to delay at [facility].  He does not want to reschedule at this time.  Discussed medications he should be on for blood pressure.  Patient reports he takes midodrine and metoprolol with hydralazine PRN.  Patient does not take nifedipine.  Patient states he will call office if he decides to reschedule missed appointment.

## 2023-07-12 ENCOUNTER — OFFICE VISIT (OUTPATIENT)
Dept: ORTHOPEDICS | Facility: CLINIC | Age: 73
End: 2023-07-12
Payer: MEDICARE

## 2023-07-12 VITALS
SYSTOLIC BLOOD PRESSURE: 158 MMHG | HEIGHT: 74 IN | HEART RATE: 68 BPM | BODY MASS INDEX: 25.75 KG/M2 | DIASTOLIC BLOOD PRESSURE: 95 MMHG | WEIGHT: 200.63 LBS

## 2023-07-12 DIAGNOSIS — G89.29 CHRONIC PAIN OF LEFT ANKLE: ICD-10-CM

## 2023-07-12 DIAGNOSIS — M25.572 CHRONIC PAIN OF LEFT ANKLE: ICD-10-CM

## 2023-07-12 PROCEDURE — 3080F DIAST BP >= 90 MM HG: CPT | Mod: HCNC,CPTII,S$GLB,

## 2023-07-12 PROCEDURE — 3044F PR MOST RECENT HEMOGLOBIN A1C LEVEL <7.0%: ICD-10-PCS | Mod: HCNC,CPTII,S$GLB,

## 2023-07-12 PROCEDURE — 1101F PT FALLS ASSESS-DOCD LE1/YR: CPT | Mod: HCNC,CPTII,S$GLB,

## 2023-07-12 PROCEDURE — 3008F BODY MASS INDEX DOCD: CPT | Mod: HCNC,CPTII,S$GLB,

## 2023-07-12 PROCEDURE — 3080F PR MOST RECENT DIASTOLIC BLOOD PRESSURE >= 90 MM HG: ICD-10-PCS | Mod: HCNC,CPTII,S$GLB,

## 2023-07-12 PROCEDURE — 3288F PR FALLS RISK ASSESSMENT DOCUMENTED: ICD-10-PCS | Mod: HCNC,CPTII,S$GLB,

## 2023-07-12 PROCEDURE — 3077F SYST BP >= 140 MM HG: CPT | Mod: HCNC,CPTII,S$GLB,

## 2023-07-12 PROCEDURE — 1101F PR PT FALLS ASSESS DOC 0-1 FALLS W/OUT INJ PAST YR: ICD-10-PCS | Mod: HCNC,CPTII,S$GLB,

## 2023-07-12 PROCEDURE — 1125F PR PAIN SEVERITY QUANTIFIED, PAIN PRESENT: ICD-10-PCS | Mod: HCNC,CPTII,S$GLB,

## 2023-07-12 PROCEDURE — 3008F PR BODY MASS INDEX (BMI) DOCUMENTED: ICD-10-PCS | Mod: HCNC,CPTII,S$GLB,

## 2023-07-12 PROCEDURE — 99203 OFFICE O/P NEW LOW 30 MIN: CPT | Mod: HCNC,S$GLB,,

## 2023-07-12 PROCEDURE — 1125F AMNT PAIN NOTED PAIN PRSNT: CPT | Mod: HCNC,CPTII,S$GLB,

## 2023-07-12 PROCEDURE — 3044F HG A1C LEVEL LT 7.0%: CPT | Mod: HCNC,CPTII,S$GLB,

## 2023-07-12 PROCEDURE — 99203 PR OFFICE/OUTPT VISIT, NEW, LEVL III, 30-44 MIN: ICD-10-PCS | Mod: HCNC,S$GLB,,

## 2023-07-12 PROCEDURE — 3077F PR MOST RECENT SYSTOLIC BLOOD PRESSURE >= 140 MM HG: ICD-10-PCS | Mod: HCNC,CPTII,S$GLB,

## 2023-07-12 PROCEDURE — 1159F PR MEDICATION LIST DOCUMENTED IN MEDICAL RECORD: ICD-10-PCS | Mod: HCNC,CPTII,S$GLB,

## 2023-07-12 PROCEDURE — 1159F MED LIST DOCD IN RCRD: CPT | Mod: HCNC,CPTII,S$GLB,

## 2023-07-12 PROCEDURE — 3066F NEPHROPATHY DOC TX: CPT | Mod: HCNC,CPTII,S$GLB,

## 2023-07-12 PROCEDURE — 3066F PR DOCUMENTATION OF TREATMENT FOR NEPHROPATHY: ICD-10-PCS | Mod: HCNC,CPTII,S$GLB,

## 2023-07-12 PROCEDURE — 99999 PR PBB SHADOW E&M-EST. PATIENT-LVL IV: ICD-10-PCS | Mod: PBBFAC,HCNC,,

## 2023-07-12 PROCEDURE — 99999 PR PBB SHADOW E&M-EST. PATIENT-LVL IV: CPT | Mod: PBBFAC,HCNC,,

## 2023-07-12 PROCEDURE — 3288F FALL RISK ASSESSMENT DOCD: CPT | Mod: HCNC,CPTII,S$GLB,

## 2023-07-12 NOTE — PROGRESS NOTES
Patient ID: Jarrett Charlton Jr. is a 73 y.o. male    CC: left ankle pain      History of Present Illness:    Jarrett Charlton Jr. presents to clinic for left ankle pain. Patient denies known PETE. The pain started 5 months ago and is becoming progressively worse.  Pain is located over (points to) medial ankle. He reports that the pain is a 3 /10 sharp pain today. The pain is affecting ADLs and limiting desired level of activity. Denies numbness, tingling, radiation and inability to bear weight.  Pain is 7 /10 at its worst. Reports pain worsened with certain movements. Also endorses nighttime symptoms.  Here today as he is concerned he has a blood clot due to pain.    He has not tried anything to help with pain.    Occupation: retired    Ambulating: unassisted  Diabetic: +  Lab Results   Component Value Date    HGBA1C 6.3 (H) 06/26/2023     Smoking: no  Hx of DVT/PE: no  + daily ASA    PAST MEDICAL HISTORY:   Past Medical History:   Diagnosis Date    Basal cell carcinoma     BPH (benign prostatic hyperplasia)     s/p TURP    Carotid stenosis     Chronic kidney disease     Claudication     Coronary artery disease     DDD (degenerative disc disease) 10/21/2013    Diabetes mellitus with renal complications     Disc disease, degenerative, cervical     Encounter for blood transfusion     GERD (gastroesophageal reflux disease)     Gout, chronic     History of ulcerative colitis     s/p colectomy and ileostomy    HLD (hyperlipidemia)     HTN (hypertension)     Ileostomy in place 1982    RBBB     Squamous cell carcinoma 03/08/2018    Left superior helix near insertion    Squamous cell carcinoma 04/12/2018    Left forearm x 5    Ventricular tachycardia      PAST SURGICAL HISTORY:   Past Surgical History:   Procedure Laterality Date    cardiac stents      CATARACT EXTRACTION Bilateral     CERVICAL FUSION      CHOLECYSTECTOMY N/A 2/14/2023    Procedure: CHOLECYSTECTOMY;  Surgeon: Mike Joyce MD;  Location: [facility] OR;   Service: General;  Laterality: N/A;  very high probabilty of converison to open    colectomy and ileostomy  1985    ENDOSCOPIC ULTRASOUND OF UPPER GASTROINTESTINAL TRACT N/A 2/10/2023    Procedure: ULTRASOUND, UPPER GI TRACT, ENDOSCOPIC;  Surgeon: Poli Fuller MD;  Location: [facility] ENDO;  Service: Endoscopy;  Laterality: N/A;    ERCP N/A 2/10/2023    Procedure: ERCP (ENDOSCOPIC RETROGRADE CHOLANGIOPANCREATOGRAPHY);  Surgeon: Poli Fuller MD;  Location: [facility] ENDO;  Service: Endoscopy;  Laterality: N/A;    EXCISION OF PAROTID GLAND Left 12/18/2020    Procedure: EXCISION, PAROTID GLAND;  Surgeon: Michael Pinzon MD;  Location: [facility] OR 2ND FLR;  Service: ENT;  Laterality: Left;    INSERTION OF IMPLANTABLE LOOP RECORDER  06/07/2021    INSERTION OF IMPLANTABLE LOOP RECORDER Left 6/7/2021    Procedure: INSERTION, IMPLANTABLE LOOP RECORDER;  Surgeon: Romario Dao MD;  Location: [facility] CATH LAB;  Service: Cardiology;  Laterality: Left;    LEFT HEART CATHETERIZATION Right 4/15/2021    Procedure: CATHETERIZATION, HEART, LEFT;  Surgeon: Marcio Jones MD;  Location: [facility] CATH LAB;  Service: Cardiology;  Laterality: Right;    LYSIS OF ADHESIONS N/A 11/9/2020    Procedure: LYSIS, ADHESIONS,  ERAS low;  Surgeon: CED Haley MD;  Location: [facility] OR 2ND FLR;  Service: Colon and Rectal;  Laterality: N/A;    LYSIS OF ADHESIONS N/A 2/14/2023    Procedure: LYSIS, ADHESIONS;  Surgeon: Mike Joyce MD;  Location: [facility] OR;  Service: General;  Laterality: N/A;    POUCHOSCOPY N/A 4/6/2022    Procedure: ENDOSCOPY, POUCH, SMALL INTESTINE, DIAGNOSTIC;  Surgeon: Dieter Juarez MD;  Location: [facility] ENDO (2ND FLR);  Service: Endoscopy;  Laterality: N/A;    REPAIR, HERNIA, PARASTOMAL N/A 11/9/2020    Procedure: REPAIR, HERNIA, PARASTOMAL;  Surgeon: CED Haley MD;  Location: [facility] OR 2ND FLR;  Service: Colon and Rectal;  Laterality: N/A;    TRANSURETHRAL RESECTION OF PROSTATE (TURP) WITHOUT USE OF LASER N/A  1/23/2019    Procedure: TURP, WITHOUT USING LASER BIPOLAR;  Surgeon: Catarino Mota MD;  Location: [facility] OR [address] [place] [address];  Service: Urology;  Laterality: N/A;  1.5 HOURS     FAMILY HISTORY:   Family History   Problem Relation Age of Onset    Cancer Father     Diabetes Father     Dementia Mother     Melanoma Neg Hx     Hypertension Neg Hx     Arthritis Neg Hx      SOCIAL HISTORY:   Social History     Occupational History    Occupation:  x 44 years     Comment: Retired    Occupation: [place]    Tobacco Use    Smoking status: Never    Smokeless tobacco: Never   Substance and Sexual Activity    Alcohol use: No    Drug use: No    Sexual activity: Not Currently     Partners: Female        MEDICATIONS:   Current Outpatient Medications:     ACCU-CHEK PAUL CONTROL SOLN Soln, 1 drop by NOT APPLICABLE route once daily., Disp: , Rfl:     ACCU-CHEK SOFTCLIX LANCETS [facility], TEST BLOOD SUGAR THREE TIMES DAILY, Disp: 300 each, Rfl: 3    allopurinoL (ZYLOPRIM) 300 MG tablet, Take 1.5 tablets (450 mg total) by mouth once daily., Disp: 135 tablet, Rfl: 3    aspirin (ECOTRIN) 81 MG EC tablet, Take 1 tablet (81 mg total) by mouth once daily., Disp: 30 tablet, Rfl: 1    blood sugar diagnostic (ONETOUCH ULTRA TEST) Strp, USE ONE STRIP TO CHECK GLUCOSE EVERY DAY, Disp: 100 each, Rfl: 11    blood-glucose meter (ACCU-CHEK GUIDE GLUCOSE METER) [facility], Accucheck BID, Disp: 1 each, Rfl: 0    brimonidine 0.2% (ALPHAGAN) 0.2 % Drop, INSTILL 1 DROP INTO BOTH EYES TWICE DAILY, Disp: 20 mL, Rfl: 0    calcium-vitamin D3 (OS-DAKOTA 500 + D3) 500 mg-5 mcg (200 unit) per tablet, Take 1 tablet by mouth once daily., Disp: 30 tablet, Rfl: 1    cetirizine (ZYRTEC) 5 MG tablet, Take 1 tablet (5 mg total) by mouth once daily., Disp: 30 tablet, Rfl: 1    cholestyramine-aspartame (QUESTRAN LIGHT) 4 gram PwPk, Take 1 packet (4 g total) by mouth 3 (three) times daily with meals., Disp: 270 packet, Rfl: 1    DROPSAFE ALCOHOL PREP PADS PadM, USE  TOPICALLY 5 (FIVE) TIMES DAILY., Disp: 100 each, Rfl: 5    FIBER, CALCIUM POLYCARBOPHIL, 625 mg tablet, Take 3 tablets by mouth before dinner., Disp: , Rfl:     fludrocortisone (FLORINEF) 0.1 mg Tab, Take 1 tablet (100 mcg total) by mouth once daily., Disp: 180 tablet, Rfl: 0    gabapentin (NEURONTIN) 100 MG capsule, Take 2 capsules (200 mg total) by mouth 3 (three) times daily., Disp: 180 capsule, Rfl: 1    glimepiride (AMARYL) 4 MG tablet, TAKE 1 TABLET (4 MG TOTAL) BY MOUTH 2 (TWO) TIMES DAILY BEFORE MEALS., Disp: 180 tablet, Rfl: 1    LIDOcaine (LIDODERM) 5 %, Place 1 patch onto the skin as needed. Remove & Discard patch within 12 hours or as directed by MD, Disp: 30 patch, Rfl: 0    LORazepam (ATIVAN) 0.5 MG tablet, Take 1 tablet (0.5 mg total) by mouth every 8 (eight) hours as needed for Anxiety., Disp: 5 tablet, Rfl: 0    magnesium oxide 400 mg magnesium Tab, 1 po qdaily, Disp: 90 tablet, Rfl: 3    metoprolol succinate (TOPROL-XL) 25 MG 24 hr tablet, Take 1 tablet (25 mg total) by mouth once daily., Disp: 90 tablet, Rfl: 1    midodrine (PROAMATINE) 10 MG tablet, Take 1 tablet (10 mg total) by mouth daily as needed (Hypotension)., Disp: 30 tablet, Rfl: 0    oxybutynin (DITROPAN-XL) 10 MG 24 hr tablet, Take 1 tablet (10 mg total) by mouth once daily., Disp: 30 tablet, Rfl: 1    oxyCODONE (ROXICODONE) 5 MG immediate release tablet, Take 1 tablet (5 mg total) by mouth every 8 (eight) hours as needed for Pain., Disp: 25 tablet, Rfl: 0    pantoprazole (PROTONIX) 40 MG tablet, Take 1 tablet (40 mg total) by mouth once daily., Disp: 90 tablet, Rfl: 0    pravastatin (PRAVACHOL) 40 MG tablet, Take 1 tablet (40 mg total) by mouth once daily., Disp: 90 tablet, Rfl: 0    sodium bicarbonate 650 MG tablet, Take 1 tablet (650 mg total) by mouth once daily., Disp: 90 tablet, Rfl: 3  No current facility-administered medications for this visit.    Facility-Administered Medications Ordered in Other Visits:     sodium chloride  0.9% in 1,000 mL infusion, , Intravenous, Continuous, Paper Order  ALLERGIES:   Review of patient's allergies indicates:   Allergen Reactions    Crestor [rosuvastatin]     Lipitor [atorvastatin]          Physical Exam     Vitals:    07/12/23 1359   BP: (!) 158/95   Pulse: 68     Alert and oriented to person, place and time. No acute distress. Well-groomed, not ill appearing. Pupils round and reactive, normal respiratory effort, no audible wheezing.     left Foot and Ankle Exam    INSPECTION:        Gait:    Antalgic     Scars:   None     Color:   Normal      Atrophy:  None      Heel / Toe Walking: Did not assess    ALIGNMENT:    Hindfoot  Normal  Forefoot  Normal                        TENDERNESS:       Sinus tarsi:    none    Syndesmosis:    none    ATFL:     none           Fibula:     none  Peroneal tendons:   none        Talonavicular:    +   Anterior tibialis:   none   Anterolateral joint line:  +  Anteromedial joint line:  none          Deltoid:    none    Malleolus:    +    PTT:     none    Navicular:    none       Achilles    none  Calcaneal Insertion   none  Retrocalcaneus   none        RANGE OF MOTION:     Ankle DF/PF:    15/45        Eversion/Inversion:   15/25     Midfoot ABD/ADD:   10/10     First MTP DF/PF:   60/25    STRENGTH    Peroneals:  5/5  Anterior tibialis: 5/5  Posterior tibialis: 5/5  Gastroc-soleus: 5/5  EHL:   5/5  FHL:   5/5             SPECIAL TESTS:    Anterior drawer:   Normal      (C-W contralateral side)          Forced DF/ER: No pain at syndesmosis.  Mid-leg squeeze  No pain at syndesmosis  Forced DF:  No pain anterior joint line.    Forced PF:  No pain posterior ankle.   Forced INV:  No pain lateral  Forced EV:  No pain medial   Gonzalezs sign: Normal ankle plantar flexion.   Resisted peroneal No subluxation or pain  1st-2nd MT toggle No pain at Lisfranc    Negative Homans, no pain with calf squeeze.  No warmth, erythema, or redness.      Imaging:     Left ankle X-rays  ordered/reviewed by me showing no evidence of fracture or dislocation. There is no obvious malalignment. No evidence of masses, lesions or foreign bodies. Joint space narrowing at mid tarsal joint.     Assessment & Plan    Chronic pain of left ankle  -     Ambulatory referral/consult to Orthopedics         I made the decision to obtain old records of the patient including previous notes and imaging. New imaging was ordered today of the extremity or extremities evaluated. I independently reviewed and interpreted the radiographs and/or MRIs/CT scan today as well as prior imaging.    We discussed at length different treatment options including conservative vs surgical management. These include anti-inflammatories, acetaminophen, rest, ice, heat, formal physical therapy including strengthening and stretching exercises, home exercise programs, injections, dry needling, and finally surgical intervention.      Discussed with patient pain likely due to arthritis at mid tarsal joint.  He is not interested in any surgical intervention at this time.  Recommended over-the-counter Voltaren gel, pea-sized amount to 3 times a day to affected area.  Also recommended home exercise program or ankle conditioning.  If patient would like to discuss surgery, he will up the clinic know so we can refer him to Foot and Ankle specialist to discuss fusion.    Follow up: prn  X-rays next visit: none     All questions were answered and patient is agreeable to the above plan.

## 2023-07-14 ENCOUNTER — INFUSION (OUTPATIENT)
Dept: INFECTIOUS DISEASES | Facility: HOSPITAL | Age: 73
End: 2023-07-14
Attending: NURSE PRACTITIONER
Payer: MEDICARE

## 2023-07-14 ENCOUNTER — OFFICE VISIT (OUTPATIENT)
Dept: PRIMARY CARE CLINIC | Facility: CLINIC | Age: 73
End: 2023-07-14
Payer: MEDICARE

## 2023-07-14 VITALS
OXYGEN SATURATION: 97 % | TEMPERATURE: 98 F | WEIGHT: 214.94 LBS | HEART RATE: 88 BPM | SYSTOLIC BLOOD PRESSURE: 179 MMHG | BODY MASS INDEX: 27.6 KG/M2 | DIASTOLIC BLOOD PRESSURE: 87 MMHG

## 2023-07-14 VITALS
WEIGHT: 215.94 LBS | SYSTOLIC BLOOD PRESSURE: 178 MMHG | HEART RATE: 83 BPM | RESPIRATION RATE: 18 BRPM | OXYGEN SATURATION: 98 % | HEIGHT: 74 IN | DIASTOLIC BLOOD PRESSURE: 97 MMHG | BODY MASS INDEX: 27.71 KG/M2

## 2023-07-14 DIAGNOSIS — N18.32 CHRONIC RENAL IMPAIRMENT, STAGE 3B: ICD-10-CM

## 2023-07-14 DIAGNOSIS — N18.32 TYPE 2 DIABETES MELLITUS WITH STAGE 3B CHRONIC KIDNEY DISEASE, WITHOUT LONG-TERM CURRENT USE OF INSULIN: ICD-10-CM

## 2023-07-14 DIAGNOSIS — I10 HYPERTENSION, UNSPECIFIED TYPE: Primary | ICD-10-CM

## 2023-07-14 DIAGNOSIS — Z93.3 COLOSTOMY PRESENT: ICD-10-CM

## 2023-07-14 DIAGNOSIS — E11.22 TYPE 2 DIABETES MELLITUS WITH STAGE 3B CHRONIC KIDNEY DISEASE, WITHOUT LONG-TERM CURRENT USE OF INSULIN: ICD-10-CM

## 2023-07-14 DIAGNOSIS — E86.0 DEHYDRATION: Primary | ICD-10-CM

## 2023-07-14 PROCEDURE — 25000003 PHARM REV CODE 250: Mod: HCNC | Performed by: NURSE PRACTITIONER

## 2023-07-14 PROCEDURE — 99214 PR OFFICE/OUTPT VISIT, EST, LEVL IV, 30-39 MIN: ICD-10-PCS | Mod: HCNC,S$GLB,, | Performed by: INTERNAL MEDICINE

## 2023-07-14 PROCEDURE — 1160F PR REVIEW ALL MEDS BY PRESCRIBER/CLIN PHARMACIST DOCUMENTED: ICD-10-PCS | Mod: HCNC,CPTII,S$GLB, | Performed by: INTERNAL MEDICINE

## 2023-07-14 PROCEDURE — 3044F PR MOST RECENT HEMOGLOBIN A1C LEVEL <7.0%: ICD-10-PCS | Mod: HCNC,CPTII,S$GLB, | Performed by: INTERNAL MEDICINE

## 2023-07-14 PROCEDURE — 3080F DIAST BP >= 90 MM HG: CPT | Mod: HCNC,CPTII,S$GLB, | Performed by: INTERNAL MEDICINE

## 2023-07-14 PROCEDURE — 1159F PR MEDICATION LIST DOCUMENTED IN MEDICAL RECORD: ICD-10-PCS | Mod: HCNC,CPTII,S$GLB, | Performed by: INTERNAL MEDICINE

## 2023-07-14 PROCEDURE — 1101F PT FALLS ASSESS-DOCD LE1/YR: CPT | Mod: HCNC,CPTII,S$GLB, | Performed by: INTERNAL MEDICINE

## 2023-07-14 PROCEDURE — 3080F PR MOST RECENT DIASTOLIC BLOOD PRESSURE >= 90 MM HG: ICD-10-PCS | Mod: HCNC,CPTII,S$GLB, | Performed by: INTERNAL MEDICINE

## 2023-07-14 PROCEDURE — 3044F HG A1C LEVEL LT 7.0%: CPT | Mod: HCNC,CPTII,S$GLB, | Performed by: INTERNAL MEDICINE

## 2023-07-14 PROCEDURE — 96360 HYDRATION IV INFUSION INIT: CPT | Mod: HCNC

## 2023-07-14 PROCEDURE — 3077F PR MOST RECENT SYSTOLIC BLOOD PRESSURE >= 140 MM HG: ICD-10-PCS | Mod: HCNC,CPTII,S$GLB, | Performed by: INTERNAL MEDICINE

## 2023-07-14 PROCEDURE — 3077F SYST BP >= 140 MM HG: CPT | Mod: HCNC,CPTII,S$GLB, | Performed by: INTERNAL MEDICINE

## 2023-07-14 PROCEDURE — 1126F PR PAIN SEVERITY QUANTIFIED, NO PAIN PRESENT: ICD-10-PCS | Mod: HCNC,CPTII,S$GLB, | Performed by: INTERNAL MEDICINE

## 2023-07-14 PROCEDURE — 1101F PR PT FALLS ASSESS DOC 0-1 FALLS W/OUT INJ PAST YR: ICD-10-PCS | Mod: HCNC,CPTII,S$GLB, | Performed by: INTERNAL MEDICINE

## 2023-07-14 PROCEDURE — 3008F BODY MASS INDEX DOCD: CPT | Mod: HCNC,CPTII,S$GLB, | Performed by: INTERNAL MEDICINE

## 2023-07-14 PROCEDURE — 99214 OFFICE O/P EST MOD 30 MIN: CPT | Mod: HCNC,S$GLB,, | Performed by: INTERNAL MEDICINE

## 2023-07-14 PROCEDURE — 3066F NEPHROPATHY DOC TX: CPT | Mod: HCNC,CPTII,S$GLB, | Performed by: INTERNAL MEDICINE

## 2023-07-14 PROCEDURE — 99999 PR PBB SHADOW E&M-EST. PATIENT-LVL III: CPT | Mod: PBBFAC,HCNC,, | Performed by: INTERNAL MEDICINE

## 2023-07-14 PROCEDURE — 3008F PR BODY MASS INDEX (BMI) DOCUMENTED: ICD-10-PCS | Mod: HCNC,CPTII,S$GLB, | Performed by: INTERNAL MEDICINE

## 2023-07-14 PROCEDURE — 1160F RVW MEDS BY RX/DR IN RCRD: CPT | Mod: HCNC,CPTII,S$GLB, | Performed by: INTERNAL MEDICINE

## 2023-07-14 PROCEDURE — 3066F PR DOCUMENTATION OF TREATMENT FOR NEPHROPATHY: ICD-10-PCS | Mod: HCNC,CPTII,S$GLB, | Performed by: INTERNAL MEDICINE

## 2023-07-14 PROCEDURE — 99999 PR PBB SHADOW E&M-EST. PATIENT-LVL III: ICD-10-PCS | Mod: PBBFAC,HCNC,, | Performed by: INTERNAL MEDICINE

## 2023-07-14 PROCEDURE — 1126F AMNT PAIN NOTED NONE PRSNT: CPT | Mod: HCNC,CPTII,S$GLB, | Performed by: INTERNAL MEDICINE

## 2023-07-14 PROCEDURE — 3288F PR FALLS RISK ASSESSMENT DOCUMENTED: ICD-10-PCS | Mod: HCNC,CPTII,S$GLB, | Performed by: INTERNAL MEDICINE

## 2023-07-14 PROCEDURE — 1159F MED LIST DOCD IN RCRD: CPT | Mod: HCNC,CPTII,S$GLB, | Performed by: INTERNAL MEDICINE

## 2023-07-14 PROCEDURE — 3288F FALL RISK ASSESSMENT DOCD: CPT | Mod: HCNC,CPTII,S$GLB, | Performed by: INTERNAL MEDICINE

## 2023-07-14 RX ORDER — BUTALBITAL, ACETAMINOPHEN AND CAFFEINE 50; 325; 40 MG/1; MG/1; MG/1
1 TABLET ORAL 2 TIMES DAILY PRN
Qty: 30 TABLET | Refills: 0 | Status: SHIPPED | OUTPATIENT
Start: 2023-07-14 | End: 2023-08-13

## 2023-07-14 RX ORDER — SODIUM CHLORIDE 0.9 % (FLUSH) 0.9 %
10 SYRINGE (ML) INJECTION
Status: CANCELLED | OUTPATIENT
Start: 2023-07-18

## 2023-07-14 RX ORDER — HYDRALAZINE HYDROCHLORIDE 25 MG/1
25 TABLET, FILM COATED ORAL 3 TIMES DAILY PRN
Qty: 90 TABLET | Refills: 1 | Status: SHIPPED | OUTPATIENT
Start: 2023-07-14 | End: 2023-08-30

## 2023-07-14 RX ORDER — HEPARIN 100 UNIT/ML
500 SYRINGE INTRAVENOUS
Status: DISCONTINUED | OUTPATIENT
Start: 2023-07-14 | End: 2023-07-14 | Stop reason: HOSPADM

## 2023-07-14 RX ORDER — HEPARIN 100 UNIT/ML
500 SYRINGE INTRAVENOUS
Status: CANCELLED | OUTPATIENT
Start: 2023-07-18

## 2023-07-14 RX ORDER — SODIUM CHLORIDE 0.9 % (FLUSH) 0.9 %
10 SYRINGE (ML) INJECTION
Status: DISCONTINUED | OUTPATIENT
Start: 2023-07-14 | End: 2023-07-14 | Stop reason: HOSPADM

## 2023-07-14 RX ADMIN — SODIUM CHLORIDE 1000 ML: 9 INJECTION, SOLUTION INTRAVENOUS at 08:07

## 2023-07-14 NOTE — PROGRESS NOTES
Patient received 1 liter Normal Saline x 1hr, pt tolerated well & left in NAD, next appt made

## 2023-07-14 NOTE — PROGRESS NOTES
Subjective:       Patient ID: Jarrett Charlton Jr. is a 73 y.o. male.    Chief Complaint: Hypertension (Elevated ) and Discuss Medication     HPI  Pt visit today for F/U from ER with high BP pts BP sometime very low on midodrine he is getting IF NS twice a week he has high OP ileostomy.he denies sob cp WILSON he is depressed from multiple medical conditions inclufding DM HTN CRI gout arthritis pt lives by himself his daughter nearby. He also c/o numbness tingling in hands feet and dizziness at night sometime  Review of Systems    Objective:      Physical Exam  Vitals and nursing note reviewed.   Constitutional:       General: He is not in acute distress.     Appearance: He is well-developed.   HENT:      Head: Normocephalic and atraumatic.      Right Ear: External ear normal.      Left Ear: External ear normal.      Nose: Nose normal.      Mouth/Throat:      Pharynx: No oropharyngeal exudate.   Eyes:      Extraocular Movements: Extraocular movements intact.      Conjunctiva/sclera: Conjunctivae normal.      Pupils: Pupils are equal, round, and reactive to light.   Neck:      Thyroid: No thyromegaly.   Cardiovascular:      Rate and Rhythm: Normal rate and regular rhythm.      Heart sounds: Normal heart sounds. No murmur heard.    No friction rub. No gallop.   Pulmonary:      Effort: Pulmonary effort is normal. No respiratory distress.      Breath sounds: Normal breath sounds. No wheezing.   Abdominal:      General: Bowel sounds are normal. There is no distension.      Palpations: Abdomen is soft.      Tenderness: There is no abdominal tenderness. There is no rebound.   Musculoskeletal:         General: No tenderness or deformity. Normal range of motion.      Cervical back: Normal range of motion and neck supple.   Lymphadenopathy:      Cervical: No cervical adenopathy.   Skin:     General: Skin is warm and dry.      Capillary Refill: Capillary refill takes less than 2 seconds.      Findings: No erythema or rash.    Neurological:      Mental Status: He is alert and oriented to person, place, and time.   Psychiatric:         Thought Content: Thought content normal.         Judgment: Judgment normal.       Assessment:       1. Hypertension, unspecified type    2. Chronic renal impairment, stage 3b    3. Type 2 diabetes mellitus with stage 3b chronic kidney disease, without long-term current use of insulin        Plan:       Hypertension, unspecified type  Comments:  hold midocrine continue with monitor   Orders:  -     hydrALAZINE (APRESOLINE) 25 MG tablet; Take 1 tablet (25 mg total) by mouth 3 (three) times daily as needed (for high BP systolic >180 or diastolic > 100).  Dispense: 90 tablet; Refill: 1    Chronic renal impairment, stage 3b  Comments:  stable avoid dehydration and NSAIDS PPI  Orders:  -     Basic Metabolic Panel; Future; Expected date: 07/28/2023    Type 2 diabetes mellitus with stage 3b chronic kidney disease, without long-term current use of insulin  Comments:  well controlled continue with same treatments    Other orders  -     butalbital-acetaminophen-caffeine -40 mg (FIORICET, ESGIC) -40 mg per tablet; Take 1 tablet by mouth 2 (two) times daily as needed for Headaches.  Dispense: 30 tablet; Refill: 0        Medication List with Changes/Refills   New Medications    BUTALBITAL-ACETAMINOPHEN-CAFFEINE -40 MG (FIORICET, ESGIC) -40 MG PER TABLET    Take 1 tablet by mouth 2 (two) times daily as needed for Headaches.    HYDRALAZINE (APRESOLINE) 25 MG TABLET    Take 1 tablet (25 mg total) by mouth 3 (three) times daily as needed (for high BP systolic >180 or diastolic > 100).   Current Medications    ACCU-CHEK PAUL CONTROL SOLN SOLN    1 drop by NOT APPLICABLE route once daily.    ACCU-CHEK SOFTCLIX LANCETS MISC    TEST BLOOD SUGAR THREE TIMES DAILY    ALLOPURINOL (ZYLOPRIM) 300 MG TABLET    Take 1.5 tablets (450 mg total) by mouth once daily.    ASPIRIN (ECOTRIN) 81 MG EC TABLET    Take 1  tablet (81 mg total) by mouth once daily.    BLOOD SUGAR DIAGNOSTIC (ONETOUCH ULTRA TEST) STRP    USE ONE STRIP TO CHECK GLUCOSE EVERY DAY    BLOOD-GLUCOSE METER (ACCU-CHEK GUIDE GLUCOSE METER) [facility]    Accucheck BID    BRIMONIDINE 0.2% (ALPHAGAN) 0.2 % DROP    INSTILL 1 DROP INTO BOTH EYES TWICE DAILY    CALCIUM-VITAMIN D3 (OS-DAKOTA 500 + D3) 500 MG-5 MCG (200 UNIT) PER TABLET    Take 1 tablet by mouth once daily.    CETIRIZINE (ZYRTEC) 5 MG TABLET    Take 1 tablet (5 mg total) by mouth once daily.    CHOLESTYRAMINE-ASPARTAME (QUESTRAN LIGHT) 4 GRAM PWPK    Take 1 packet (4 g total) by mouth 3 (three) times daily with meals.    DROPSAFE ALCOHOL PREP PADS PADM    USE TOPICALLY 5 (FIVE) TIMES DAILY.    FIBER, CALCIUM POLYCARBOPHIL, 625 MG TABLET    Take 3 tablets by mouth before dinner.    FLUDROCORTISONE (FLORINEF) 0.1 MG TAB    Take 1 tablet (100 mcg total) by mouth once daily.    GABAPENTIN (NEURONTIN) 100 MG CAPSULE    Take 2 capsules (200 mg total) by mouth 3 (three) times daily.    GLIMEPIRIDE (AMARYL) 4 MG TABLET    TAKE 1 TABLET (4 MG TOTAL) BY MOUTH 2 (TWO) TIMES DAILY BEFORE MEALS.    LIDOCAINE (LIDODERM) 5 %    Place 1 patch onto the skin as needed. Remove & Discard patch within 12 hours or as directed by MD    LORAZEPAM (ATIVAN) 0.5 MG TABLET    Take 1 tablet (0.5 mg total) by mouth every 8 (eight) hours as needed for Anxiety.    MAGNESIUM OXIDE 400 MG MAGNESIUM TAB    1 po qdaily    METOPROLOL SUCCINATE (TOPROL-XL) 25 MG 24 HR TABLET    Take 1 tablet (25 mg total) by mouth once daily.    MIDODRINE (PROAMATINE) 10 MG TABLET    Take 1 tablet (10 mg total) by mouth daily as needed (Hypotension).    OXYBUTYNIN (DITROPAN-XL) 10 MG 24 HR TABLET    Take 1 tablet (10 mg total) by mouth once daily.    OXYCODONE (ROXICODONE) 5 MG IMMEDIATE RELEASE TABLET    Take 1 tablet (5 mg total) by mouth every 8 (eight) hours as needed for Pain.    PANTOPRAZOLE (PROTONIX) 40 MG TABLET    Take 1 tablet (40 mg total) by mouth once  daily.    PRAVASTATIN (PRAVACHOL) 40 MG TABLET    Take 1 tablet (40 mg total) by mouth once daily.    SODIUM BICARBONATE 650 MG TABLET    Take 1 tablet (650 mg total) by mouth once daily.

## 2023-07-18 ENCOUNTER — INFUSION (OUTPATIENT)
Dept: INFECTIOUS DISEASES | Facility: HOSPITAL | Age: 73
End: 2023-07-18
Attending: INTERNAL MEDICINE
Payer: MEDICARE

## 2023-07-18 VITALS
SYSTOLIC BLOOD PRESSURE: 167 MMHG | RESPIRATION RATE: 18 BRPM | WEIGHT: 214.31 LBS | DIASTOLIC BLOOD PRESSURE: 95 MMHG | HEIGHT: 74 IN | OXYGEN SATURATION: 98 % | BODY MASS INDEX: 27.5 KG/M2 | HEART RATE: 78 BPM | TEMPERATURE: 98 F

## 2023-07-18 DIAGNOSIS — E11.22 TYPE 2 DIABETES MELLITUS WITH STAGE 3B CHRONIC KIDNEY DISEASE, WITHOUT LONG-TERM CURRENT USE OF INSULIN: ICD-10-CM

## 2023-07-18 DIAGNOSIS — Z93.3 COLOSTOMY PRESENT: ICD-10-CM

## 2023-07-18 DIAGNOSIS — E86.0 DEHYDRATION: Primary | ICD-10-CM

## 2023-07-18 DIAGNOSIS — N18.32 TYPE 2 DIABETES MELLITUS WITH STAGE 3B CHRONIC KIDNEY DISEASE, WITHOUT LONG-TERM CURRENT USE OF INSULIN: ICD-10-CM

## 2023-07-18 PROCEDURE — 96360 HYDRATION IV INFUSION INIT: CPT | Mod: HCNC

## 2023-07-18 PROCEDURE — 25000003 PHARM REV CODE 250: Mod: HCNC | Performed by: INTERNAL MEDICINE

## 2023-07-18 RX ORDER — SODIUM CHLORIDE 0.9 % (FLUSH) 0.9 %
10 SYRINGE (ML) INJECTION
Status: CANCELLED | OUTPATIENT
Start: 2023-07-21

## 2023-07-18 RX ORDER — SODIUM CHLORIDE 0.9 % (FLUSH) 0.9 %
10 SYRINGE (ML) INJECTION
Status: DISCONTINUED | OUTPATIENT
Start: 2023-07-18 | End: 2023-07-18 | Stop reason: HOSPADM

## 2023-07-18 RX ORDER — HEPARIN 100 UNIT/ML
500 SYRINGE INTRAVENOUS
Status: CANCELLED | OUTPATIENT
Start: 2023-07-21

## 2023-07-18 RX ADMIN — SODIUM CHLORIDE 1000 ML: 9 INJECTION, SOLUTION INTRAVENOUS at 08:07

## 2023-07-18 NOTE — PROGRESS NOTES
Patient received 1 liter Normal Saline x 1hr, pt tolerated well & left in NAD, next appt made

## 2023-07-21 ENCOUNTER — INFUSION (OUTPATIENT)
Dept: INFECTIOUS DISEASES | Facility: HOSPITAL | Age: 73
End: 2023-07-21
Attending: INTERNAL MEDICINE
Payer: MEDICARE

## 2023-07-21 VITALS
DIASTOLIC BLOOD PRESSURE: 62 MMHG | SYSTOLIC BLOOD PRESSURE: 180 MMHG | HEART RATE: 80 BPM | OXYGEN SATURATION: 97 % | WEIGHT: 214.19 LBS | TEMPERATURE: 98 F | BODY MASS INDEX: 27.5 KG/M2

## 2023-07-21 DIAGNOSIS — Z93.3 COLOSTOMY PRESENT: ICD-10-CM

## 2023-07-21 DIAGNOSIS — E11.22 TYPE 2 DIABETES MELLITUS WITH STAGE 3B CHRONIC KIDNEY DISEASE, WITHOUT LONG-TERM CURRENT USE OF INSULIN: ICD-10-CM

## 2023-07-21 DIAGNOSIS — N18.32 TYPE 2 DIABETES MELLITUS WITH STAGE 3B CHRONIC KIDNEY DISEASE, WITHOUT LONG-TERM CURRENT USE OF INSULIN: ICD-10-CM

## 2023-07-21 DIAGNOSIS — E86.0 DEHYDRATION: Primary | ICD-10-CM

## 2023-07-21 PROCEDURE — 25000003 PHARM REV CODE 250: Mod: HCNC | Performed by: NURSE PRACTITIONER

## 2023-07-21 PROCEDURE — 96360 HYDRATION IV INFUSION INIT: CPT | Mod: HCNC

## 2023-07-21 RX ORDER — HEPARIN 100 UNIT/ML
500 SYRINGE INTRAVENOUS
Status: CANCELLED | OUTPATIENT
Start: 2023-07-25

## 2023-07-21 RX ORDER — SODIUM CHLORIDE 0.9 % (FLUSH) 0.9 %
10 SYRINGE (ML) INJECTION
Status: CANCELLED | OUTPATIENT
Start: 2023-07-25

## 2023-07-21 RX ADMIN — SODIUM CHLORIDE 1000 ML: 9 INJECTION, SOLUTION INTRAVENOUS at 07:07

## 2023-07-25 ENCOUNTER — INFUSION (OUTPATIENT)
Dept: INFECTIOUS DISEASES | Facility: HOSPITAL | Age: 73
End: 2023-07-25
Attending: INTERNAL MEDICINE
Payer: MEDICARE

## 2023-07-25 VITALS
OXYGEN SATURATION: 92 % | HEART RATE: 79 BPM | DIASTOLIC BLOOD PRESSURE: 83 MMHG | HEIGHT: 74 IN | TEMPERATURE: 98 F | BODY MASS INDEX: 27.21 KG/M2 | WEIGHT: 212 LBS | SYSTOLIC BLOOD PRESSURE: 160 MMHG

## 2023-07-25 DIAGNOSIS — E86.0 DEHYDRATION: Primary | ICD-10-CM

## 2023-07-25 DIAGNOSIS — E11.22 TYPE 2 DIABETES MELLITUS WITH STAGE 3B CHRONIC KIDNEY DISEASE, WITHOUT LONG-TERM CURRENT USE OF INSULIN: ICD-10-CM

## 2023-07-25 DIAGNOSIS — Z93.3 COLOSTOMY PRESENT: ICD-10-CM

## 2023-07-25 DIAGNOSIS — N18.32 TYPE 2 DIABETES MELLITUS WITH STAGE 3B CHRONIC KIDNEY DISEASE, WITHOUT LONG-TERM CURRENT USE OF INSULIN: ICD-10-CM

## 2023-07-25 PROCEDURE — 25000003 PHARM REV CODE 250: Mod: HCNC | Performed by: NURSE PRACTITIONER

## 2023-07-25 PROCEDURE — 96360 HYDRATION IV INFUSION INIT: CPT | Mod: HCNC

## 2023-07-25 RX ORDER — SODIUM CHLORIDE 0.9 % (FLUSH) 0.9 %
10 SYRINGE (ML) INJECTION
Status: CANCELLED | OUTPATIENT
Start: 2023-07-28

## 2023-07-25 RX ORDER — HEPARIN 100 UNIT/ML
500 SYRINGE INTRAVENOUS
Status: CANCELLED | OUTPATIENT
Start: 2023-07-28

## 2023-07-25 RX ADMIN — SODIUM CHLORIDE 1000 ML: 9 INJECTION, SOLUTION INTRAVENOUS at 07:07

## 2023-07-25 NOTE — PROGRESS NOTES
Patient received 1 liter Normal Saline x 1hr, pt tolerated well & left in NAD, next appt made. Limited head-to-toe assessment due to privacy issues and visit reason though the opportunity was given for patient to express any concerns .                                                 CONSTITUTIONAL: Well-developed; well-nourished; in no acute distress.   SKIN: warm, dry  HEAD: Normocephalic; atraumatic.  EYES: no conjunctival injection. no scleral icterus  ENT: No nasal discharge; airway clear.  NECK: Supple; good ROM  CARD: RLE with good cap refill, dp pulse intact  ABD: soft nondistended  EXT: RLE wrapped tightly in ace bandage with posterior splint. Upon removal had skin markings from the bandage. Good movement of toes, good cap refill, good sensation, good pulses. No overlying skin edema or maceration.  NEURO: Alert, oriented, grossly unremarkable  PSYCH: Cooperative, appropriate.

## 2023-07-26 DIAGNOSIS — I25.10 CORONARY ARTERY DISEASE INVOLVING NATIVE CORONARY ARTERY OF NATIVE HEART WITHOUT ANGINA PECTORIS: ICD-10-CM

## 2023-07-26 RX ORDER — PRAVASTATIN SODIUM 40 MG/1
TABLET ORAL
Qty: 90 TABLET | Refills: 1 | Status: ON HOLD | OUTPATIENT
Start: 2023-07-26 | End: 2023-11-27 | Stop reason: SDUPTHER

## 2023-07-28 ENCOUNTER — INFUSION (OUTPATIENT)
Dept: INFECTIOUS DISEASES | Facility: HOSPITAL | Age: 73
End: 2023-07-28
Attending: INTERNAL MEDICINE
Payer: MEDICARE

## 2023-07-28 VITALS
HEART RATE: 65 BPM | DIASTOLIC BLOOD PRESSURE: 74 MMHG | TEMPERATURE: 98 F | BODY MASS INDEX: 27.19 KG/M2 | OXYGEN SATURATION: 96 % | SYSTOLIC BLOOD PRESSURE: 158 MMHG | RESPIRATION RATE: 16 BRPM | WEIGHT: 211.75 LBS

## 2023-07-28 DIAGNOSIS — E86.0 DEHYDRATION: Primary | ICD-10-CM

## 2023-07-28 DIAGNOSIS — E11.22 TYPE 2 DIABETES MELLITUS WITH STAGE 3B CHRONIC KIDNEY DISEASE, WITHOUT LONG-TERM CURRENT USE OF INSULIN: ICD-10-CM

## 2023-07-28 DIAGNOSIS — Z93.3 COLOSTOMY PRESENT: ICD-10-CM

## 2023-07-28 DIAGNOSIS — N18.32 TYPE 2 DIABETES MELLITUS WITH STAGE 3B CHRONIC KIDNEY DISEASE, WITHOUT LONG-TERM CURRENT USE OF INSULIN: ICD-10-CM

## 2023-07-28 PROCEDURE — 25000003 PHARM REV CODE 250: Mod: HCNC | Performed by: NURSE PRACTITIONER

## 2023-07-28 PROCEDURE — 96360 HYDRATION IV INFUSION INIT: CPT | Mod: HCNC

## 2023-07-28 RX ORDER — HEPARIN 100 UNIT/ML
500 SYRINGE INTRAVENOUS
Status: CANCELLED | OUTPATIENT
Start: 2023-08-01

## 2023-07-28 RX ORDER — SODIUM CHLORIDE 0.9 % (FLUSH) 0.9 %
10 SYRINGE (ML) INJECTION
Status: CANCELLED | OUTPATIENT
Start: 2023-08-01

## 2023-07-28 RX ADMIN — SODIUM CHLORIDE 1000 ML: 9 INJECTION, SOLUTION INTRAVENOUS at 08:07

## 2023-07-28 NOTE — PROGRESS NOTES
Patient received 1 liter Normal Saline x 1hr, pt tolerated well & left in NAD, next appt made. Limited head-to-toe assessment due to privacy issues and visit reason though the opportunity was given for patient to express any concerns .

## 2023-08-01 ENCOUNTER — OFFICE VISIT (OUTPATIENT)
Dept: CARDIOLOGY | Facility: CLINIC | Age: 73
End: 2023-08-01
Payer: MEDICARE

## 2023-08-01 ENCOUNTER — INFUSION (OUTPATIENT)
Dept: INFECTIOUS DISEASES | Facility: HOSPITAL | Age: 73
End: 2023-08-01
Attending: INTERNAL MEDICINE
Payer: MEDICARE

## 2023-08-01 VITALS
BODY MASS INDEX: 27.51 KG/M2 | HEART RATE: 69 BPM | RESPIRATION RATE: 16 BRPM | TEMPERATURE: 99 F | WEIGHT: 214.31 LBS | OXYGEN SATURATION: 96 % | SYSTOLIC BLOOD PRESSURE: 132 MMHG | DIASTOLIC BLOOD PRESSURE: 74 MMHG

## 2023-08-01 VITALS
DIASTOLIC BLOOD PRESSURE: 90 MMHG | WEIGHT: 216.94 LBS | HEIGHT: 74 IN | HEART RATE: 74 BPM | SYSTOLIC BLOOD PRESSURE: 160 MMHG | OXYGEN SATURATION: 98 % | BODY MASS INDEX: 27.84 KG/M2

## 2023-08-01 DIAGNOSIS — I47.29 PAROXYSMAL VENTRICULAR TACHYCARDIA: ICD-10-CM

## 2023-08-01 DIAGNOSIS — I47.20 VT (VENTRICULAR TACHYCARDIA): ICD-10-CM

## 2023-08-01 DIAGNOSIS — E86.0 DEHYDRATION: Primary | ICD-10-CM

## 2023-08-01 DIAGNOSIS — I10 ESSENTIAL HYPERTENSION: ICD-10-CM

## 2023-08-01 DIAGNOSIS — I71.21 ASCENDING AORTIC ANEURYSM, UNSPECIFIED WHETHER RUPTURED: ICD-10-CM

## 2023-08-01 DIAGNOSIS — E78.2 MIXED HYPERLIPIDEMIA: ICD-10-CM

## 2023-08-01 DIAGNOSIS — R94.31 PROLONGED QT INTERVAL: ICD-10-CM

## 2023-08-01 DIAGNOSIS — N18.32 TYPE 2 DIABETES MELLITUS WITH STAGE 3B CHRONIC KIDNEY DISEASE, WITHOUT LONG-TERM CURRENT USE OF INSULIN: ICD-10-CM

## 2023-08-01 DIAGNOSIS — R94.31 ABNORMAL EKG: Primary | ICD-10-CM

## 2023-08-01 DIAGNOSIS — I25.10 CORONARY ARTERY DISEASE INVOLVING NATIVE CORONARY ARTERY OF NATIVE HEART WITHOUT ANGINA PECTORIS: ICD-10-CM

## 2023-08-01 DIAGNOSIS — Z93.3 COLOSTOMY PRESENT: ICD-10-CM

## 2023-08-01 DIAGNOSIS — E11.22 TYPE 2 DIABETES MELLITUS WITH STAGE 3B CHRONIC KIDNEY DISEASE, WITHOUT LONG-TERM CURRENT USE OF INSULIN: ICD-10-CM

## 2023-08-01 DIAGNOSIS — I70.0 AORTIC ATHEROSCLEROSIS: ICD-10-CM

## 2023-08-01 DIAGNOSIS — Z95.828 PRESENCE OF ARTERIAL STENT: ICD-10-CM

## 2023-08-01 DIAGNOSIS — I95.1 SYNCOPE DUE TO ORTHOSTATIC HYPOTENSION: ICD-10-CM

## 2023-08-01 PROCEDURE — 1126F PR PAIN SEVERITY QUANTIFIED, NO PAIN PRESENT: ICD-10-PCS | Mod: HCNC,CPTII,S$GLB, | Performed by: INTERNAL MEDICINE

## 2023-08-01 PROCEDURE — 1159F PR MEDICATION LIST DOCUMENTED IN MEDICAL RECORD: ICD-10-PCS | Mod: HCNC,CPTII,S$GLB, | Performed by: INTERNAL MEDICINE

## 2023-08-01 PROCEDURE — 1159F MED LIST DOCD IN RCRD: CPT | Mod: HCNC,CPTII,S$GLB, | Performed by: INTERNAL MEDICINE

## 2023-08-01 PROCEDURE — 1101F PR PT FALLS ASSESS DOC 0-1 FALLS W/OUT INJ PAST YR: ICD-10-PCS | Mod: HCNC,CPTII,S$GLB, | Performed by: INTERNAL MEDICINE

## 2023-08-01 PROCEDURE — 3044F HG A1C LEVEL LT 7.0%: CPT | Mod: HCNC,CPTII,S$GLB, | Performed by: INTERNAL MEDICINE

## 2023-08-01 PROCEDURE — 3008F PR BODY MASS INDEX (BMI) DOCUMENTED: ICD-10-PCS | Mod: HCNC,CPTII,S$GLB, | Performed by: INTERNAL MEDICINE

## 2023-08-01 PROCEDURE — 3080F PR MOST RECENT DIASTOLIC BLOOD PRESSURE >= 90 MM HG: ICD-10-PCS | Mod: HCNC,CPTII,S$GLB, | Performed by: INTERNAL MEDICINE

## 2023-08-01 PROCEDURE — 99999 PR PBB SHADOW E&M-EST. PATIENT-LVL IV: ICD-10-PCS | Mod: PBBFAC,HCNC,, | Performed by: INTERNAL MEDICINE

## 2023-08-01 PROCEDURE — 99215 OFFICE O/P EST HI 40 MIN: CPT | Mod: HCNC,S$GLB,, | Performed by: INTERNAL MEDICINE

## 2023-08-01 PROCEDURE — 1126F AMNT PAIN NOTED NONE PRSNT: CPT | Mod: HCNC,CPTII,S$GLB, | Performed by: INTERNAL MEDICINE

## 2023-08-01 PROCEDURE — 3288F PR FALLS RISK ASSESSMENT DOCUMENTED: ICD-10-PCS | Mod: HCNC,CPTII,S$GLB, | Performed by: INTERNAL MEDICINE

## 2023-08-01 PROCEDURE — 3077F PR MOST RECENT SYSTOLIC BLOOD PRESSURE >= 140 MM HG: ICD-10-PCS | Mod: HCNC,CPTII,S$GLB, | Performed by: INTERNAL MEDICINE

## 2023-08-01 PROCEDURE — 3008F BODY MASS INDEX DOCD: CPT | Mod: HCNC,CPTII,S$GLB, | Performed by: INTERNAL MEDICINE

## 2023-08-01 PROCEDURE — 3288F FALL RISK ASSESSMENT DOCD: CPT | Mod: HCNC,CPTII,S$GLB, | Performed by: INTERNAL MEDICINE

## 2023-08-01 PROCEDURE — 99999 PR PBB SHADOW E&M-EST. PATIENT-LVL IV: CPT | Mod: PBBFAC,HCNC,, | Performed by: INTERNAL MEDICINE

## 2023-08-01 PROCEDURE — 3066F PR DOCUMENTATION OF TREATMENT FOR NEPHROPATHY: ICD-10-PCS | Mod: HCNC,CPTII,S$GLB, | Performed by: INTERNAL MEDICINE

## 2023-08-01 PROCEDURE — 3066F NEPHROPATHY DOC TX: CPT | Mod: HCNC,CPTII,S$GLB, | Performed by: INTERNAL MEDICINE

## 2023-08-01 PROCEDURE — 25000003 PHARM REV CODE 250: Mod: HCNC | Performed by: NURSE PRACTITIONER

## 2023-08-01 PROCEDURE — 3080F DIAST BP >= 90 MM HG: CPT | Mod: HCNC,CPTII,S$GLB, | Performed by: INTERNAL MEDICINE

## 2023-08-01 PROCEDURE — 3077F SYST BP >= 140 MM HG: CPT | Mod: HCNC,CPTII,S$GLB, | Performed by: INTERNAL MEDICINE

## 2023-08-01 PROCEDURE — 99215 PR OFFICE/OUTPT VISIT, EST, LEVL V, 40-54 MIN: ICD-10-PCS | Mod: HCNC,S$GLB,, | Performed by: INTERNAL MEDICINE

## 2023-08-01 PROCEDURE — 1101F PT FALLS ASSESS-DOCD LE1/YR: CPT | Mod: HCNC,CPTII,S$GLB, | Performed by: INTERNAL MEDICINE

## 2023-08-01 PROCEDURE — 96360 HYDRATION IV INFUSION INIT: CPT | Mod: HCNC

## 2023-08-01 PROCEDURE — 3044F PR MOST RECENT HEMOGLOBIN A1C LEVEL <7.0%: ICD-10-PCS | Mod: HCNC,CPTII,S$GLB, | Performed by: INTERNAL MEDICINE

## 2023-08-01 RX ORDER — HEPARIN 100 UNIT/ML
500 SYRINGE INTRAVENOUS
Status: CANCELLED | OUTPATIENT
Start: 2023-08-04

## 2023-08-01 RX ORDER — SODIUM CHLORIDE 0.9 % (FLUSH) 0.9 %
10 SYRINGE (ML) INJECTION
Status: CANCELLED | OUTPATIENT
Start: 2023-08-04

## 2023-08-01 RX ADMIN — SODIUM CHLORIDE 1000 ML: 9 INJECTION, SOLUTION INTRAVENOUS at 08:08

## 2023-08-01 NOTE — PROGRESS NOTES
Patient to receive 1 liter Normal Saline x 1hr, pt tolerated well, next appt made. Limited head-to-toe assessment due to privacy issues and visit reason though the opportunity was given for patient to express any concerns .

## 2023-08-01 NOTE — PROGRESS NOTES
" [facility] [address]  Cardiology Clinic Note      Chief Complaint  Chief Complaint   Patient presents with    Follow-up       HPI:    72-year-old male with a past medical history SBO, s/p colostomy, GERD, parotid mass s/p resection, BPH & prostate CA s/p TURP, CKD 4, pleural plaque, ("neurogenic") orthostatic hypotension, hypertension, hyperlipidemia, DM2, carotid stenosis not hemodynamically significant ultrasound 03/2023, ascending thoracic aorta 4.4 cm CT 2018, lower extremity arterial duplex 09/2022 no evidence of hemodynamically significant stenosis nonvisualization of the peroneal arteries prior SAKSHI 2019 normal, CAD status post PCI to mid LAD 2017 followed by cardiac catheterization 2021 patent stent nonobstructive disease prior MPI 2018 inferolateral ischemia, sustained ventricular tachycardia documented on discharge summary 4/2021 on amiodarone previous loop recorder implantation 2021 presumed link transmission showed several episodes of ventricular tachycardia with longest episode 36 beats also documented either atrial fibrillation RVR with aberrant conduction or AV radha reentry tachycardia (this was documented by an outside provider note scan 03/2021), echo 12/2022 normal EF mild LVH diastolic function indeterminate normal RV PASP 32+ CVP which could not be determined ascending aorta mildly dilated    Hospitalized with cholestatic jaundice s/p cholecystectomy with lysis of adhesions 2/14     Nephrology has IVF scheduled twice weekly secondary to losses from ostomy  Staying hydrated no NSAIDs     Neurology is following as well suspected autonomic dysfunction with orthostatic component      Last note from outside cardiology which states patient has pAF in note scan 2/3/2023    Seen by electrophysiology amiodarone was held and suggested loop recorder.  AF episodes seen on previous monitoring were most consistent with artifact.    Patient currently is managing his labile hypertension with p.r.n. " hydralazine but he is still taking Toprol for other indications    Previously checked CMP with markedly elevated LFTs sent to ER and held statin already off amio  LFTs have come down it appears he was discharged from the ER directly  Statin held  He is also had to go to the emergency room for intravenous fluids  PCP place patient on oxybutynin for high ileostomy output  Recently seen by Nephrology continuing IVF twice weekly labs ordered  PEC'd for suicidal ideation    LFTs have improved   Restarted statin    Since our last visit the patient has had multiple ER visits predominantly related to hypotension and seen Gastroenterology  Recent labs reviewed last check there was mild thrombocytopenia    Discuss medication list with patient and there was some confusion about midodrine  Call the patient he should only take managing the blood pressure is very low    After adequate resting.  Patient's blood pressure came down to 160/90    Medications  Current Outpatient Medications   Medication Sig Dispense Refill    allopurinoL (ZYLOPRIM) 300 MG tablet Take 1.5 tablets (450 mg total) by mouth once daily. 135 tablet 3    aspirin (ECOTRIN) 81 MG EC tablet Take 1 tablet (81 mg total) by mouth once daily. 30 tablet 1    brimonidine 0.2% (ALPHAGAN) 0.2 % Drop INSTILL 1 DROP INTO BOTH EYES TWICE DAILY 20 mL 0    butalbital-acetaminophen-caffeine -40 mg (FIORICET, ESGIC) -40 mg per tablet Take 1 tablet by mouth 2 (two) times daily as needed for Headaches. 30 tablet 0    ACCU-CHEK PAUL CONTROL SOLN Soln 1 drop by NOT APPLICABLE route once daily.      ACCU-CHEK SOFTCLIX LANCETS [facility] [place] TEST BLOOD SUGAR THREE TIMES DAILY 300 each 3    blood sugar diagnostic (ONETOUCH ULTRA TEST) RUST USE ONE STRIP TO CHECK GLUCOSE EVERY  each 11    blood-glucose meter (ACCU-CHEK GUIDE GLUCOSE METER) [facility] [place] Accucheck BID 1 each 0    calcium-vitamin D3 (OS-DAKOTA 500 + D3) 500 mg-5 mcg (200 unit) per tablet Take 1 tablet by mouth once daily.  30 tablet 1    cetirizine (ZYRTEC) 5 MG tablet Take 1 tablet (5 mg total) by mouth once daily. 30 tablet 1    cholestyramine-aspartame (QUESTRAN LIGHT) 4 gram PwPk Take 1 packet (4 g total) by mouth 3 (three) times daily with meals. 270 packet 1    DROPSAFE ALCOHOL PREP PADS PadM USE TOPICALLY 5 (FIVE) TIMES DAILY. 100 each 5    FIBER, CALCIUM POLYCARBOPHIL, 625 mg tablet Take 3 tablets by mouth before dinner.      fludrocortisone (FLORINEF) 0.1 mg Tab Take 1 tablet (100 mcg total) by mouth once daily. 180 tablet 0    gabapentin (NEURONTIN) 100 MG capsule Take 2 capsules (200 mg total) by mouth 3 (three) times daily. 180 capsule 1    glimepiride (AMARYL) 4 MG tablet TAKE 1 TABLET (4 MG TOTAL) BY MOUTH 2 (TWO) TIMES DAILY BEFORE MEALS. 180 tablet 1    hydrALAZINE (APRESOLINE) 25 MG tablet Take 1 tablet (25 mg total) by mouth 3 (three) times daily as needed (for high BP systolic >180 or diastolic > 100). 90 tablet 1    LIDOcaine (LIDODERM) 5 % Place 1 patch onto the skin as needed. Remove & Discard patch within 12 hours or as directed by MD 30 patch 0    LORazepam (ATIVAN) 0.5 MG tablet Take 1 tablet (0.5 mg total) by mouth every 8 (eight) hours as needed for Anxiety. 5 tablet 0    magnesium oxide 400 mg magnesium Tab 1 po qdaily 90 tablet 3    metoprolol succinate (TOPROL-XL) 25 MG 24 hr tablet Take 1 tablet (25 mg total) by mouth once daily. 90 tablet 1    midodrine (PROAMATINE) 10 MG tablet Take 1 tablet (10 mg total) by mouth daily as needed (Hypotension). (Patient not taking: Reported on 8/1/2023) 30 tablet 0    oxybutynin (DITROPAN-XL) 10 MG 24 hr tablet Take 1 tablet (10 mg total) by mouth once daily. 30 tablet 1    oxyCODONE (ROXICODONE) 5 MG immediate release tablet Take 1 tablet (5 mg total) by mouth every 8 (eight) hours as needed for Pain. 25 tablet 0    pantoprazole (PROTONIX) 40 MG tablet Take 1 tablet (40 mg total) by mouth once daily. 90 tablet 0    pravastatin (PRAVACHOL) 40 MG tablet TAKE 1 TABLET  ONE TIME DAILY 90 tablet 1    sodium bicarbonate 650 MG tablet Take 1 tablet (650 mg total) by mouth once daily. 90 tablet 3     No current facility-administered medications for this visit.     Facility-Administered Medications Ordered in Other Visits   Medication Dose Route Frequency Provider Last Rate Last Admin    sodium chloride 0.9% in 1,000 mL infusion   Intravenous Continuous Paper Order            History  Past Medical History:   Diagnosis Date    Basal cell carcinoma     BPH (benign prostatic hyperplasia)     s/p TURP    Carotid stenosis     Chronic kidney disease     Claudication     Coronary artery disease     DDD (degenerative disc disease) 10/21/2013    Diabetes mellitus with renal complications     Disc disease, degenerative, cervical     Encounter for blood transfusion     GERD (gastroesophageal reflux disease)     Gout, chronic     History of ulcerative colitis     s/p colectomy and ileostomy    HLD (hyperlipidemia)     HTN (hypertension)     Ileostomy in place 1982    RBBB     Squamous cell carcinoma 03/08/2018    Left superior helix near insertion    Squamous cell carcinoma 04/12/2018    Left forearm x 5    Ventricular tachycardia      Past Surgical History:   Procedure Laterality Date    cardiac stents      CATARACT EXTRACTION Bilateral     CERVICAL FUSION      CHOLECYSTECTOMY N/A 2/14/2023    Procedure: CHOLECYSTECTOMY;  Surgeon: Mike Joyce MD;  Location: [facility] OR;  Service: General;  Laterality: N/A;  very high probabilty of converison to open    colectomy and ileostomy  1985    ENDOSCOPIC ULTRASOUND OF UPPER GASTROINTESTINAL TRACT N/A 2/10/2023    Procedure: ULTRASOUND, UPPER GI TRACT, ENDOSCOPIC;  Surgeon: Poli Fuller MD;  Location: [facility] ENDO;  Service: Endoscopy;  Laterality: N/A;    ERCP N/A 2/10/2023    Procedure: ERCP (ENDOSCOPIC RETROGRADE CHOLANGIOPANCREATOGRAPHY);  Surgeon: Poli Fuller MD;  Location: [facility] ENDO;  Service: Endoscopy;  Laterality: N/A;    EXCISION OF PAROTID  GLAND Left 12/18/2020    Procedure: EXCISION, PAROTID GLAND;  Surgeon: Michael Pinzon MD;  Location: [facility] OR 2ND FLR;  Service: ENT;  Laterality: Left;    INSERTION OF IMPLANTABLE LOOP RECORDER  06/07/2021    INSERTION OF IMPLANTABLE LOOP RECORDER Left 6/7/2021    Procedure: INSERTION, IMPLANTABLE LOOP RECORDER;  Surgeon: Romario Dao MD;  Location: [facility] CATH LAB;  Service: Cardiology;  Laterality: Left;    LEFT HEART CATHETERIZATION Right 4/15/2021    Procedure: CATHETERIZATION, HEART, LEFT;  Surgeon: Marcio Jones MD;  Location: [facility] CATH LAB;  Service: Cardiology;  Laterality: Right;    LYSIS OF ADHESIONS N/A 11/9/2020    Procedure: LYSIS, ADHESIONS,  ERAS low;  Surgeon: CED Haley MD;  Location: [facility] OR 2ND FLR;  Service: Colon and Rectal;  Laterality: N/A;    LYSIS OF ADHESIONS N/A 2/14/2023    Procedure: LYSIS, ADHESIONS;  Surgeon: Mike Joyce MD;  Location: [facility];  Service: General;  Laterality: N/A;    POUCHOSCOPY N/A 4/6/2022    Procedure: ENDOSCOPY, POUCH, SMALL INTESTINE, DIAGNOSTIC;  Surgeon: Dieter Juarez MD;  Location: [facility] ENDO (2ND FLR);  Service: Endoscopy;  Laterality: N/A;    REPAIR, HERNIA, PARASTOMAL N/A 11/9/2020    Procedure: REPAIR, HERNIA, PARASTOMAL;  Surgeon: CED Haley MD;  Location: [facility] OR 2ND FLR;  Service: Colon and Rectal;  Laterality: N/A;    TRANSURETHRAL RESECTION OF PROSTATE (TURP) WITHOUT USE OF LASER N/A 1/23/2019    Procedure: TURP, WITHOUT USING LASER BIPOLAR;  Surgeon: Catarino Mota MD;  Location: [facility] OR 1ST FLR;  Service: Urology;  Laterality: N/A;  1.5 HOURS     Social History     Socioeconomic History    Marital status:     Number of children: 1   Occupational History    Occupation:  x 44 years     Comment: Retired    Occupation: [place]    Tobacco Use    Smoking status: Never    Smokeless tobacco: Never   Substance and Sexual Activity    Alcohol use: No    Drug use: No    Sexual activity:  Not Currently     Partners: Female     Social Determinants of Health     Financial Resource Strain: Unknown (5/28/2023)    Overall Financial Resource Strain (CARDIA)     Difficulty of Paying Living Expenses: Patient refused   Food Insecurity: Unknown (5/28/2023)    Hunger Vital Sign     Worried About Running Out of Food in the Last Year: Patient refused     Ran Out of Food in the Last Year: Patient refused   Transportation Needs: No Transportation Needs (5/28/2023)    PRAPARE - Transportation     Lack of Transportation (Medical): No     Lack of Transportation (Non-Medical): No   Physical Activity: Sufficiently Active (5/28/2023)    Exercise Vital Sign     Days of Exercise per Week: 7 days     Minutes of Exercise per Session: 60 min   Stress: Unknown (5/28/2023)    [employer] - Occupational Stress Questionnaire     Feeling of Stress : Patient refused   Social Connections: Unknown (5/28/2023)    Social Connection and Isolation Panel [NHANES]     Frequency of Communication with Friends and Family: Patient refused     Frequency of Social Gatherings with Friends and Family: Patient refused     Attends Cheondoism Services: Patient refused     Active Member of Clubs or Organizations: Patient refused     Attends Club or Organization Meetings: Patient refused     Marital Status:    Housing Stability: Unknown (5/28/2023)    Housing Stability Vital Sign     Unable to Pay for Housing in the Last Year: Patient refused     Number of Places Lived in the Last Year: 1     Unstable Housing in the Last Year: Patient refused     Family History   Problem Relation Age of Onset    Cancer Father     Diabetes Father     Dementia Mother     Melanoma Neg Hx     Hypertension Neg Hx     Arthritis Neg Hx         Allergies  Review of patient's allergies indicates:   Allergen Reactions    Crestor [rosuvastatin]     Lipitor [atorvastatin]        Review of Systems   Review of Systems   Constitutional: Negative for  fever.   HENT:  Negative for nosebleeds.    Eyes:  Negative for visual disturbance.   Cardiovascular:  Negative for chest pain, claudication, dyspnea on exertion, palpitations and syncope.   Respiratory:  Negative for cough, hemoptysis and wheezing.    Endocrine: Negative for cold intolerance, heat intolerance, polyphagia and polyuria.   Hematologic/Lymphatic: Negative for bleeding problem.   Skin:  Negative for rash.   Musculoskeletal:  Negative for myalgias.   Gastrointestinal:  Negative for hematemesis, hematochezia, nausea and vomiting.   Genitourinary:  Negative for dysuria.   Neurological:  Negative for focal weakness and sensory change.   Psychiatric/Behavioral:  Negative for altered mental status.        Physical Exam  There were no vitals filed for this visit.    Wt Readings from Last 1 Encounters:   08/01/23 98.4 kg (216 lb 14.9 oz)     Physical Exam  HENT:      Head: Normocephalic and atraumatic.      Mouth/Throat:      Mouth: Mucous membranes are moist.   Eyes:      Extraocular Movements: Extraocular movements intact.      Pupils: Pupils are equal, round, and reactive to light.   Neck:      Vascular: No carotid bruit or JVD.   Cardiovascular:      Rate and Rhythm: Normal rate and regular rhythm.      Pulses: Normal pulses and intact distal pulses.      Heart sounds: Normal heart sounds. No murmur heard.     No friction rub. No gallop.   Pulmonary:      Effort: Pulmonary effort is normal.      Breath sounds: Normal breath sounds.   Abdominal:      Tenderness: There is no abdominal tenderness. There is no guarding or rebound.   Musculoskeletal:      Right lower leg: No edema.      Left lower leg: No edema.   Skin:     General: Skin is warm and dry.      Capillary Refill: Capillary refill takes less than 2 seconds.   Neurological:      General: No focal deficit present.      Mental Status: He is alert and oriented to person, place, and time.   Psychiatric:         Mood and Affect: Mood normal.          Labs  Admission on 07/12/2023, Discharged on 07/12/2023   Component Date Value Ref Range Status    WBC 07/12/2023 5.93  3.90 - 12.70 K/uL Final    RBC 07/12/2023 3.93 (L)  4.60 - 6.20 M/uL Final    Hemoglobin 07/12/2023 11.3 (L)  14.0 - 18.0 g/dL Final    Hematocrit 07/12/2023 36.0 (L)  40.0 - 54.0 % Final    MCV 07/12/2023 92  82 - 98 fL Final    MCH 07/12/2023 28.8  27.0 - 31.0 pg Final    MCHC 07/12/2023 31.4 (L)  32.0 - 36.0 g/dL Final    RDW 07/12/2023 14.9 (H)  11.5 - 14.5 % Final    Platelets 07/12/2023 147 (L)  150 - 450 K/uL Final    MPV 07/12/2023 10.4  9.2 - 12.9 fL Final    Immature Granulocytes 07/12/2023 0.3  0.0 - 0.5 % Final    Gran # (ANC) 07/12/2023 4.3  1.8 - 7.7 K/uL Final    Immature Grans (Abs) 07/12/2023 0.02  0.00 - 0.04 K/uL Final    Comment: Mild elevation in immature granulocytes is non specific and   can be seen in a variety of conditions including stress response,   acute inflammation, trauma and pregnancy. Correlation with other   laboratory and clinical findings is essential.      Lymph # 07/12/2023 1.2  1.0 - 4.8 K/uL Final    Mono # 07/12/2023 0.3  0.3 - 1.0 K/uL Final    Eos # 07/12/2023 0.1  0.0 - 0.5 K/uL Final    Baso # 07/12/2023 0.02  0.00 - 0.20 K/uL Final    nRBC 07/12/2023 0  0 /100 WBC Final    Gran % 07/12/2023 72.4  38.0 - 73.0 % Final    Lymph % 07/12/2023 20.2  18.0 - 48.0 % Final    Mono % 07/12/2023 4.9  4.0 - 15.0 % Final    Eosinophil % 07/12/2023 1.9  0.0 - 8.0 % Final    Basophil % 07/12/2023 0.3  0.0 - 1.9 % Final    Differential Method 07/12/2023 Automated   Final    Sodium 07/12/2023 144  136 - 145 mmol/L Final    Potassium 07/12/2023 3.8  3.5 - 5.1 mmol/L Final    Chloride 07/12/2023 110  95 - 110 mmol/L Final    CO2 07/12/2023 23  23 - 29 mmol/L Final    Glucose 07/12/2023 99  70 - 110 mg/dL Final    BUN 07/12/2023 32 (H)  8 - 23 mg/dL Final    Creatinine 07/12/2023 2.5 (H)  0.5 - 1.4 mg/dL Final    Calcium 07/12/2023 8.9  8.7 - 10.5 mg/dL Final     Total Protein 07/12/2023 7.1  6.0 - 8.4 g/dL Final    Albumin 07/12/2023 3.4 (L)  3.5 - 5.2 g/dL Final    Total Bilirubin 07/12/2023 0.5  0.1 - 1.0 mg/dL Final    Comment: For infants and newborns, interpretation of results should be based  on gestational age, weight and in agreement with clinical  observations.    Premature Infant recommended reference ranges:  Up to 24 hours.............<8.0 mg/dL  Up to 48 hours............<12.0 mg/dL  3-5 days..................<15.0 mg/dL  6-29 days.................<15.0 mg/dL      Alkaline Phosphatase 07/12/2023 133  55 - 135 U/L Final    AST 07/12/2023 26  10 - 40 U/L Final    ALT 07/12/2023 24  10 - 44 U/L Final    eGFR 07/12/2023 26.3 (A)  >60 mL/min/1.73 m^2 Final    Anion Gap 07/12/2023 11  8 - 16 mmol/L Final    Prothrombin Time 07/12/2023 10.9  9.0 - 12.5 sec Final    INR 07/12/2023 1.0  0.8 - 1.2 Final    Comment: Coumadin Therapy:  2.0 - 3.0 for INR for all indicators except mechanical heart valves  and antiphospholipid syndromes which should use 2.5 - 3.5.  LOT^040^PT Inn^[id number]      TSH 07/12/2023 1.376  0.400 - 4.000 uIU/mL Final    Cholesterol 07/12/2023 135  120 - 199 mg/dL Final    Comment: The National Cholesterol Education Program (NCEP) has set the  following guidelines (reference ranges) for Cholesterol:  Optimal.....................<200 mg/dL  Borderline High.............200-239 mg/dL  High........................> or = 240 mg/dL      Triglycerides 07/12/2023 75  30 - 150 mg/dL Final    Comment: The National Cholesterol Education Program (NCEP) has set the  following guidelines (reference values) for triglycerides:  Normal......................<150 mg/dL  Borderline High.............150-199 mg/dL  High........................200-499 mg/dL      HDL 07/12/2023 45  40 - 75 mg/dL Final    Comment: The National Cholesterol Education Program (NCEP) has set the  following guidelines (reference values) for HDL Cholesterol:  Low...............<40  mg/dL  Optimal...........>60 mg/dL      LDL Cholesterol 07/12/2023 75.0  63.0 - 159.0 mg/dL Final    Comment: The National Cholesterol Education Program (NCEP) has set the  following guidelines (reference values) for LDL Cholesterol:  Optimal.......................<130 mg/dL  Borderline High...............130-159 mg/dL  High..........................160-189 mg/dL  Very High.....................>190 mg/dL      HDL/Cholesterol Ratio 07/12/2023 33.3  20.0 - 50.0 % Final    Total Cholesterol/HDL Ratio 07/12/2023 3.0  2.0 - 5.0 Final    Non-HDL Cholesterol 07/12/2023 90  mg/dL Final    Comment: Risk category and Non-HDL cholesterol goals:  Coronary heart disease (CHD)or equivalent (10-year risk of CHD >20%):  Non-HDL cholesterol goal     <130 mg/dL  Two or more CHD risk factors and 10-year risk of CHD <= 20%:  Non-HDL cholesterol goal     <160 mg/dL  0 to 1 CHD risk factor:  Non-HDL cholesterol goal     <190 mg/dL      POCT Glucose 07/12/2023 129 (H)  70 - 110 mg/dL Final   Lab Visit on 07/11/2023   Component Date Value Ref Range Status    Sodium 07/11/2023 146 (H)  136 - 145 mmol/L Final    Potassium 07/11/2023 4.3  3.5 - 5.1 mmol/L Final    Chloride 07/11/2023 112 (H)  95 - 110 mmol/L Final    CO2 07/11/2023 23  23 - 29 mmol/L Final    Glucose 07/11/2023 71  70 - 110 mg/dL Final    BUN 07/11/2023 43 (H)  8 - 23 mg/dL Final    Creatinine 07/11/2023 2.9 (H)  0.5 - 1.4 mg/dL Final    Calcium 07/11/2023 8.6 (L)  8.7 - 10.5 mg/dL Final    Anion Gap 07/11/2023 11  8 - 16 mmol/L Final    eGFR 07/11/2023 22.0 (A)  >60 mL/min/1.73 m^2 Final   Admission on 07/06/2023, Discharged on 07/06/2023   Component Date Value Ref Range Status    Specimen UA 07/06/2023 Urine, Clean Catch   Final    Color, UA 07/06/2023 Yellow  Yellow, Straw, Stacy Final    Appearance, UA 07/06/2023 Clear  Clear Final    pH, UA 07/06/2023 6.0  5.0 - 8.0 Final    Specific Gravity, UA 07/06/2023 1.015  1.005 - 1.030 Final    Protein, UA 07/06/2023 1+ (A)   Negative Final    Comment: Recommend a 24 hour urine protein or a urine   protein/creatinine ratio if globulin induced proteinuria is  clinically suspected.      Glucose, UA 07/06/2023 1+ (A)  Negative Final    Ketones, UA 07/06/2023 Negative  Negative Final    Bilirubin (UA) 07/06/2023 Negative  Negative Final    Occult Blood UA 07/06/2023 Negative  Negative Final    Nitrite, UA 07/06/2023 Negative  Negative Final    Urobilinogen, UA 07/06/2023 Negative  Negative EU/dL Final    Leukocytes, UA 07/06/2023 Negative  Negative Final    WBC 07/06/2023 8.38  3.90 - 12.70 K/uL Final    RBC 07/06/2023 4.14 (L)  4.60 - 6.20 M/uL Final    Hemoglobin 07/06/2023 11.8 (L)  14.0 - 18.0 g/dL Final    Hematocrit 07/06/2023 37.6 (L)  40.0 - 54.0 % Final    MCV 07/06/2023 91  82 - 98 fL Final    MCH 07/06/2023 28.5  27.0 - 31.0 pg Final    MCHC 07/06/2023 31.4 (L)  32.0 - 36.0 g/dL Final    RDW 07/06/2023 15.1 (H)  11.5 - 14.5 % Final    Platelets 07/06/2023 192  150 - 450 K/uL Final    MPV 07/06/2023 11.3  9.2 - 12.9 fL Final    Immature Granulocytes 07/06/2023 0.4  0.0 - 0.5 % Final    Gran # (ANC) 07/06/2023 6.1  1.8 - 7.7 K/uL Final    Immature Grans (Abs) 07/06/2023 0.03  0.00 - 0.04 K/uL Final    Comment: Mild elevation in immature granulocytes is non specific and   can be seen in a variety of conditions including stress response,   acute inflammation, trauma and pregnancy. Correlation with other   laboratory and clinical findings is essential.      Lymph # 07/06/2023 1.7  1.0 - 4.8 K/uL Final    Mono # 07/06/2023 0.4  0.3 - 1.0 K/uL Final    Eos # 07/06/2023 0.1  0.0 - 0.5 K/uL Final    Baso # 07/06/2023 0.03  0.00 - 0.20 K/uL Final    nRBC 07/06/2023 0  0 /100 WBC Final    Gran % 07/06/2023 72.9  38.0 - 73.0 % Final    Lymph % 07/06/2023 20.2  18.0 - 48.0 % Final    Mono % 07/06/2023 5.1  4.0 - 15.0 % Final    Eosinophil % 07/06/2023 1.0  0.0 - 8.0 % Final    Basophil % 07/06/2023 0.4  0.0 - 1.9 % Final    Differential Method  07/06/2023 Automated   Final    Sodium 07/06/2023 142  136 - 145 mmol/L Final    Potassium 07/06/2023 4.4  3.5 - 5.1 mmol/L Final    Chloride 07/06/2023 105  95 - 110 mmol/L Final    CO2 07/06/2023 25  23 - 29 mmol/L Final    Glucose 07/06/2023 107  70 - 110 mg/dL Final    BUN 07/06/2023 50 (H)  8 - 23 mg/dL Final    Creatinine 07/06/2023 3.0 (H)  0.5 - 1.4 mg/dL Final    Calcium 07/06/2023 9.1  8.7 - 10.5 mg/dL Final    Total Protein 07/06/2023 7.3  6.0 - 8.4 g/dL Final    Albumin 07/06/2023 3.6  3.5 - 5.2 g/dL Final    Total Bilirubin 07/06/2023 0.6  0.1 - 1.0 mg/dL Final    Comment: For infants and newborns, interpretation of results should be based  on gestational age, weight and in agreement with clinical  observations.    Premature Infant recommended reference ranges:  Up to 24 hours.............<8.0 mg/dL  Up to 48 hours............<12.0 mg/dL  3-5 days..................<15.0 mg/dL  6-29 days.................<15.0 mg/dL      Alkaline Phosphatase 07/06/2023 131  55 - 135 U/L Final    AST 07/06/2023 40  10 - 40 U/L Final    ALT 07/06/2023 30  10 - 44 U/L Final    eGFR 07/06/2023 21.1 (A)  >60 mL/min/1.73 m^2 Final    Anion Gap 07/06/2023 12  8 - 16 mmol/L Final    RBC, UA 07/06/2023 0  0 - 4 /hpf Final    WBC, UA 07/06/2023 1  0 - 5 /hpf Final    Bacteria 07/06/2023 None  None-Occ /hpf Final    Squam Epithel, UA 07/06/2023 0  /hpf Final    Hyaline Casts, UA 07/06/2023 16 (A)  0-1/lpf /lpf Final    Microscopic Comment 07/06/2023 SEE COMMENT   Final    Comment: Other formed elements not mentioned in the report are not   present in the microscopic examination.      Admission on 06/29/2023, Discharged on 06/29/2023   Component Date Value Ref Range Status    WBC 06/29/2023 10.18  3.90 - 12.70 K/uL Final    RBC 06/29/2023 4.07 (L)  4.60 - 6.20 M/uL Final    Hemoglobin 06/29/2023 11.7 (L)  14.0 - 18.0 g/dL Final    Hematocrit 06/29/2023 36.9 (L)  40.0 - 54.0 % Final    MCV 06/29/2023 91  82 - 98 fL Final    MCH  06/29/2023 28.7  27.0 - 31.0 pg Final    MCHC 06/29/2023 31.7 (L)  32.0 - 36.0 g/dL Final    RDW 06/29/2023 14.9 (H)  11.5 - 14.5 % Final    Platelets 06/29/2023 206  150 - 450 K/uL Final    MPV 06/29/2023 9.9  9.2 - 12.9 fL Final    Immature Granulocytes 06/29/2023 0.3  0.0 - 0.5 % Final    Gran # (ANC) 06/29/2023 7.3  1.8 - 7.7 K/uL Final    Immature Grans (Abs) 06/29/2023 0.03  0.00 - 0.04 K/uL Final    Comment: Mild elevation in immature granulocytes is non specific and   can be seen in a variety of conditions including stress response,   acute inflammation, trauma and pregnancy. Correlation with other   laboratory and clinical findings is essential.      Lymph # 06/29/2023 2.1  1.0 - 4.8 K/uL Final    Mono # 06/29/2023 0.6  0.3 - 1.0 K/uL Final    Eos # 06/29/2023 0.1  0.0 - 0.5 K/uL Final    Baso # 06/29/2023 0.04  0.00 - 0.20 K/uL Final    nRBC 06/29/2023 0  0 /100 WBC Final    Gran % 06/29/2023 71.9  38.0 - 73.0 % Final    Lymph % 06/29/2023 20.3  18.0 - 48.0 % Final    Mono % 06/29/2023 6.1  4.0 - 15.0 % Final    Eosinophil % 06/29/2023 1.0  0.0 - 8.0 % Final    Basophil % 06/29/2023 0.4  0.0 - 1.9 % Final    Differential Method 06/29/2023 Automated   Final    Sodium 06/29/2023 139  136 - 145 mmol/L Final    Potassium 06/29/2023 4.0  3.5 - 5.1 mmol/L Final    Chloride 06/29/2023 105  95 - 110 mmol/L Final    CO2 06/29/2023 25  23 - 29 mmol/L Final    Glucose 06/29/2023 84  70 - 110 mg/dL Final    BUN 06/29/2023 37 (H)  8 - 23 mg/dL Final    Creatinine 06/29/2023 2.7 (H)  0.5 - 1.4 mg/dL Final    Calcium 06/29/2023 8.9  8.7 - 10.5 mg/dL Final    Total Protein 06/29/2023 7.1  6.0 - 8.4 g/dL Final    Albumin 06/29/2023 3.5  3.5 - 5.2 g/dL Final    Total Bilirubin 06/29/2023 0.7  0.1 - 1.0 mg/dL Final    Comment: For infants and newborns, interpretation of results should be based  on gestational age, weight and in agreement with clinical  observations.    Premature Infant recommended reference ranges:  Up to 24  hours.............<8.0 mg/dL  Up to 48 hours............<12.0 mg/dL  3-5 days..................<15.0 mg/dL  6-29 days.................<15.0 mg/dL      Alkaline Phosphatase 06/29/2023 139 (H)  55 - 135 U/L Final    AST 06/29/2023 36  10 - 40 U/L Final    ALT 06/29/2023 29  10 - 44 U/L Final    eGFR 06/29/2023 24.0 (A)  >60 mL/min/1.73 m^2 Final    Anion Gap 06/29/2023 9  8 - 16 mmol/L Final    Specimen UA 06/29/2023 Urine, Clean Catch   Final    Color, UA 06/29/2023 Yellow  Yellow, Straw, Stacy Final    Appearance, UA 06/29/2023 Clear  Clear Final    pH, UA 06/29/2023 6.0  5.0 - 8.0 Final    Specific Gravity, UA 06/29/2023 1.010  1.005 - 1.030 Final    Protein, UA 06/29/2023 Negative  Negative Final    Comment: Recommend a 24 hour urine protein or a urine   protein/creatinine ratio if globulin induced proteinuria is  clinically suspected.      Glucose, UA 06/29/2023 Negative  Negative Final    Ketones, UA 06/29/2023 Negative  Negative Final    Bilirubin (UA) 06/29/2023 Negative  Negative Final    Occult Blood UA 06/29/2023 Negative  Negative Final    Nitrite, UA 06/29/2023 Negative  Negative Final    Urobilinogen, UA 06/29/2023 Negative  Negative EU/dL Final    Leukocytes, UA 06/29/2023 Negative  Negative Final    POCT Glucose 06/29/2023 94  70 - 110 mg/dL Final   Lab Visit on 06/26/2023   Component Date Value Ref Range Status    Specimen UA 06/26/2023 Urine, Clean Catch   Final    Color, UA 06/26/2023 Yellow  Yellow, Straw, Stacy Final    Appearance, UA 06/26/2023 Clear  Clear Final    pH, UA 06/26/2023 6.0  5.0 - 8.0 Final    Specific Gravity, UA 06/26/2023 1.015  1.005 - 1.030 Final    Protein, UA 06/26/2023 Trace (A)  Negative Final    Comment: Recommend a 24 hour urine protein or a urine   protein/creatinine ratio if globulin induced proteinuria is  clinically suspected.      Glucose, UA 06/26/2023 Trace (A)  Negative Final    Ketones, UA 06/26/2023 Negative  Negative Final    Bilirubin (UA) 06/26/2023 Negative   Negative Final    Occult Blood UA 06/26/2023 Negative  Negative Final    Nitrite, UA 06/26/2023 Negative  Negative Final    Urobilinogen, UA 06/26/2023 Negative  Negative EU/dL Final    Leukocytes, UA 06/26/2023 Negative  Negative Final   Lab Visit on 06/26/2023   Component Date Value Ref Range Status    Sodium 06/26/2023 144  136 - 145 mmol/L Final    Potassium 06/26/2023 4.6  3.5 - 5.1 mmol/L Final    Chloride 06/26/2023 111 (H)  95 - 110 mmol/L Final    CO2 06/26/2023 22 (L)  23 - 29 mmol/L Final    Glucose 06/26/2023 147 (H)  70 - 110 mg/dL Final    BUN 06/26/2023 39 (H)  8 - 23 mg/dL Final    Creatinine 06/26/2023 2.8 (H)  0.5 - 1.4 mg/dL Final    Calcium 06/26/2023 9.1  8.7 - 10.5 mg/dL Final    Total Protein 06/26/2023 7.2  6.0 - 8.4 g/dL Final    Albumin 06/26/2023 3.4 (L)  3.5 - 5.2 g/dL Final    Total Bilirubin 06/26/2023 0.8  0.1 - 1.0 mg/dL Final    Comment: For infants and newborns, interpretation of results should be based  on gestational age, weight and in agreement with clinical  observations.    Premature Infant recommended reference ranges:  Up to 24 hours.............<8.0 mg/dL  Up to 48 hours............<12.0 mg/dL  3-5 days..................<15.0 mg/dL  6-29 days.................<15.0 mg/dL      Alkaline Phosphatase 06/26/2023 146 (H)  55 - 135 U/L Final    AST 06/26/2023 32  10 - 40 U/L Final    ALT 06/26/2023 31  10 - 44 U/L Final    eGFR 06/26/2023 23.0 (A)  >60 mL/min/1.73 m^2 Final    Anion Gap 06/26/2023 11  8 - 16 mmol/L Final    Hemoglobin A1C 06/26/2023 6.3 (H)  4.0 - 5.6 % Final    Comment: ADA Screening Guidelines:  5.7-6.4%  Consistent with prediabetes  >or=6.5%  Consistent with diabetes    High levels of fetal hemoglobin interfere with the HbA1C  assay. Heterozygous hemoglobin variants (HbS, HgC, etc)do  not significantly interfere with this assay.   However, presence of multiple variants may affect accuracy.      Estimated Avg Glucose 06/26/2023 134 (H)  68 - 131 mg/dL Final     Cholesterol 06/26/2023 152  120 - 199 mg/dL Final    Comment: The National Cholesterol Education Program (NCEP) has set the  following guidelines (reference ranges) for Cholesterol:  Optimal.....................<200 mg/dL  Borderline High.............200-239 mg/dL  High........................> or = 240 mg/dL      Triglycerides 06/26/2023 86  30 - 150 mg/dL Final    Comment: The National Cholesterol Education Program (NCEP) has set the  following guidelines (reference values) for triglycerides:  Normal......................<150 mg/dL  Borderline High.............150-199 mg/dL  High........................200-499 mg/dL      HDL 06/26/2023 45  40 - 75 mg/dL Final    Comment: The National Cholesterol Education Program (NCEP) has set the  following guidelines (reference values) for HDL Cholesterol:  Low...............<40 mg/dL  Optimal...........>60 mg/dL      LDL Cholesterol 06/26/2023 89.8  63.0 - 159.0 mg/dL Final    Comment: The National Cholesterol Education Program (NCEP) has set the  following guidelines (reference values) for LDL Cholesterol:  Optimal.......................<130 mg/dL  Borderline High...............130-159 mg/dL  High..........................160-189 mg/dL  Very High.....................>190 mg/dL      HDL/Cholesterol Ratio 06/26/2023 29.6  20.0 - 50.0 % Final    Total Cholesterol/HDL Ratio 06/26/2023 3.4  2.0 - 5.0 Final    Non-HDL Cholesterol 06/26/2023 107  mg/dL Final    Comment: Risk category and Non-HDL cholesterol goals:  Coronary heart disease (CHD)or equivalent (10-year risk of CHD >20%):  Non-HDL cholesterol goal     <130 mg/dL  Two or more CHD risk factors and 10-year risk of CHD <= 20%:  Non-HDL cholesterol goal     <160 mg/dL  0 to 1 CHD risk factor:  Non-HDL cholesterol goal     <190 mg/dL      WBC 06/26/2023 8.25  3.90 - 12.70 K/uL Final    RBC 06/26/2023 4.20 (L)  4.60 - 6.20 M/uL Final    Hemoglobin 06/26/2023 11.9 (L)  14.0 - 18.0 g/dL Final    Hematocrit 06/26/2023 38.4 (L)   40.0 - 54.0 % Final    MCV 06/26/2023 91  82 - 98 fL Final    MCH 06/26/2023 28.3  27.0 - 31.0 pg Final    MCHC 06/26/2023 31.0 (L)  32.0 - 36.0 g/dL Final    RDW 06/26/2023 14.9 (H)  11.5 - 14.5 % Final    Platelets 06/26/2023 214  150 - 450 K/uL Final    MPV 06/26/2023 10.7  9.2 - 12.9 fL Final    Immature Granulocytes 06/26/2023 0.4  0.0 - 0.5 % Final    Gran # (ANC) 06/26/2023 5.8  1.8 - 7.7 K/uL Final    Immature Grans (Abs) 06/26/2023 0.03  0.00 - 0.04 K/uL Final    Comment: Mild elevation in immature granulocytes is non specific and   can be seen in a variety of conditions including stress response,   acute inflammation, trauma and pregnancy. Correlation with other   laboratory and clinical findings is essential.      Lymph # 06/26/2023 1.8  1.0 - 4.8 K/uL Final    Mono # 06/26/2023 0.5  0.3 - 1.0 K/uL Final    Eos # 06/26/2023 0.2  0.0 - 0.5 K/uL Final    Baso # 06/26/2023 0.04  0.00 - 0.20 K/uL Final    nRBC 06/26/2023 0  0 /100 WBC Final    Gran % 06/26/2023 70.1  38.0 - 73.0 % Final    Lymph % 06/26/2023 21.3  18.0 - 48.0 % Final    Mono % 06/26/2023 5.6  4.0 - 15.0 % Final    Eosinophil % 06/26/2023 2.1  0.0 - 8.0 % Final    Basophil % 06/26/2023 0.5  0.0 - 1.9 % Final    Differential Method 06/26/2023 Automated   Final   Lab Visit on 06/22/2023   Component Date Value Ref Range Status    Glucose 06/22/2023 144 (H)  70 - 110 mg/dL Final    Sodium 06/22/2023 143  136 - 145 mmol/L Final    Potassium 06/22/2023 4.6  3.5 - 5.1 mmol/L Final    Chloride 06/22/2023 108  95 - 110 mmol/L Final    CO2 06/22/2023 24  23 - 29 mmol/L Final    BUN 06/22/2023 37 (H)  8 - 23 mg/dL Final    Calcium 06/22/2023 8.8  8.7 - 10.5 mg/dL Final    Creatinine 06/22/2023 2.2 (H)  0.5 - 1.4 mg/dL Final    Albumin 06/22/2023 3.3 (L)  3.5 - 5.2 g/dL Final    Phosphorus 06/22/2023 3.0  2.7 - 4.5 mg/dL Final    eGFR 06/22/2023 30.7 (A)  >60 mL/min/1.73 m^2 Final    Anion Gap 06/22/2023 11  8 - 16 mmol/L Final   Admission on 06/12/2023,  Discharged on 06/12/2023   Component Date Value Ref Range Status    WBC 06/12/2023 6.15  3.90 - 12.70 K/uL Final    RBC 06/12/2023 4.27 (L)  4.60 - 6.20 M/uL Final    Hemoglobin 06/12/2023 12.2 (L)  14.0 - 18.0 g/dL Final    Hematocrit 06/12/2023 37.9 (L)  40.0 - 54.0 % Final    MCV 06/12/2023 89  82 - 98 fL Final    MCH 06/12/2023 28.6  27.0 - 31.0 pg Final    MCHC 06/12/2023 32.2  32.0 - 36.0 g/dL Final    RDW 06/12/2023 14.5  11.5 - 14.5 % Final    Platelets 06/12/2023 267  150 - 450 K/uL Final    MPV 06/12/2023 10.1  9.2 - 12.9 fL Final    Immature Granulocytes 06/12/2023 0.3  0.0 - 0.5 % Final    Gran # (ANC) 06/12/2023 4.0  1.8 - 7.7 K/uL Final    Immature Grans (Abs) 06/12/2023 0.02  0.00 - 0.04 K/uL Final    Comment: Mild elevation in immature granulocytes is non specific and   can be seen in a variety of conditions including stress response,   acute inflammation, trauma and pregnancy. Correlation with other   laboratory and clinical findings is essential.      Lymph # 06/12/2023 1.4  1.0 - 4.8 K/uL Final    Mono # 06/12/2023 0.5  0.3 - 1.0 K/uL Final    Eos # 06/12/2023 0.2  0.0 - 0.5 K/uL Final    Baso # 06/12/2023 0.05  0.00 - 0.20 K/uL Final    nRBC 06/12/2023 0  0 /100 WBC Final    Gran % 06/12/2023 65.7  38.0 - 73.0 % Final    Lymph % 06/12/2023 22.8  18.0 - 48.0 % Final    Mono % 06/12/2023 7.8  4.0 - 15.0 % Final    Eosinophil % 06/12/2023 2.6  0.0 - 8.0 % Final    Basophil % 06/12/2023 0.8  0.0 - 1.9 % Final    Differential Method 06/12/2023 Automated   Final    Sodium 06/12/2023 143  136 - 145 mmol/L Final    Potassium 06/12/2023 3.7  3.5 - 5.1 mmol/L Final    Chloride 06/12/2023 106  95 - 110 mmol/L Final    CO2 06/12/2023 24  23 - 29 mmol/L Final    Glucose 06/12/2023 88  70 - 110 mg/dL Final    BUN 06/12/2023 29 (H)  8 - 23 mg/dL Final    Creatinine 06/12/2023 2.2 (H)  0.5 - 1.4 mg/dL Final    Calcium 06/12/2023 9.4  8.7 - 10.5 mg/dL Final    Total Protein 06/12/2023 7.4  6.0 - 8.4 g/dL Final     Albumin 06/12/2023 3.3 (L)  3.5 - 5.2 g/dL Final    Total Bilirubin 06/12/2023 1.2 (H)  0.1 - 1.0 mg/dL Final    Comment: For infants and newborns, interpretation of results should be based  on gestational age, weight and in agreement with clinical  observations.    Premature Infant recommended reference ranges:  Up to 24 hours.............<8.0 mg/dL  Up to 48 hours............<12.0 mg/dL  3-5 days..................<15.0 mg/dL  6-29 days.................<15.0 mg/dL      Alkaline Phosphatase 06/12/2023 222 (H)  55 - 135 U/L Final    AST 06/12/2023 278 (H)  10 - 40 U/L Final    ALT 06/12/2023 168 (H)  10 - 44 U/L Final    Anion Gap 06/12/2023 13  8 - 16 mmol/L Final    eGFR 06/12/2023 30.7 (A)  >60 mL/min/1.73 m^2 Final    Troponin I 06/12/2023 0.018  0.000 - 0.026 ng/mL Final    Comment: The reference interval for Troponin I represents the 99th percentile   cutoff   for our facility and is consistent with 3rd generation assay   performance.      BNP 06/12/2023 176 (H)  0 - 99 pg/mL Final    Values of less than 100 pg/ml are consistent with non-CHF populations.    Specimen UA 06/12/2023 Urine, Clean Catch   Final    Color, UA 06/12/2023 Yellow  Yellow, Straw, Stacy Final    Appearance, UA 06/12/2023 Clear  Clear Final    pH, UA 06/12/2023 6.0  5.0 - 8.0 Final    Specific Gravity, UA 06/12/2023 1.005  1.005 - 1.030 Final    Protein, UA 06/12/2023 Negative  Negative Final    Comment: Recommend a 24 hour urine protein or a urine   protein/creatinine ratio if globulin induced proteinuria is  clinically suspected.      Glucose, UA 06/12/2023 Negative  Negative Final    Ketones, UA 06/12/2023 Negative  Negative Final    Bilirubin (UA) 06/12/2023 Negative  Negative Final    Occult Blood UA 06/12/2023 Negative  Negative Final    Nitrite, UA 06/12/2023 Negative  Negative Final    Urobilinogen, UA 06/12/2023 Negative  Negative EU/dL Final    Leukocytes, UA 06/12/2023 Negative  Negative Final    POCT Glucose 06/12/2023 90  70 -  110 mg/dL Final   Lab Visit on 06/02/2023   Component Date Value Ref Range Status    Specimen UA 06/02/2023 Urine, Clean Catch   Final    Color, UA 06/02/2023 Colorless (A)  Yellow, Straw, Stacy Final    Appearance, UA 06/02/2023 Clear  Clear Final    pH, UA 06/02/2023 5.0  5.0 - 8.0 Final    Specific Gravity, UA 06/02/2023 1.005  1.005 - 1.030 Final    Protein, UA 06/02/2023 Negative  Negative Final    Comment: Recommend a 24 hour urine protein or a urine   protein/creatinine ratio if globulin induced proteinuria is  clinically suspected.      Glucose, UA 06/02/2023 Negative  Negative Final    Ketones, UA 06/02/2023 Negative  Negative Final    Bilirubin (UA) 06/02/2023 Negative  Negative Final    Occult Blood UA 06/02/2023 Negative  Negative Final    Nitrite, UA 06/02/2023 Negative  Negative Final    Urobilinogen, UA 06/02/2023 Negative  Negative EU/dL Final    Leukocytes, UA 06/02/2023 Negative  Negative Final    Protein, Urine Random 06/02/2023 <7  0 - 15 mg/dL Final    Creatinine, Urine 06/02/2023 43.0  23.0 - 375.0 mg/dL Final    Prot/Creat Ratio, Urine 06/02/2023 Unable to calculate  0.00 - 0.20 Final   Lab Visit on 06/02/2023   Component Date Value Ref Range Status    Sodium 06/02/2023 140  136 - 145 mmol/L Final    Potassium 06/02/2023 4.8  3.5 - 5.1 mmol/L Final    Chloride 06/02/2023 115 (H)  95 - 110 mmol/L Final    CO2 06/02/2023 17 (L)  23 - 29 mmol/L Final    Glucose 06/02/2023 124 (H)  70 - 110 mg/dL Final    BUN 06/02/2023 32 (H)  8 - 23 mg/dL Final    Creatinine 06/02/2023 2.7 (H)  0.5 - 1.4 mg/dL Final    Calcium 06/02/2023 8.2 (L)  8.7 - 10.5 mg/dL Final    Anion Gap 06/02/2023 8  8 - 16 mmol/L Final    eGFR 06/02/2023 24.0 (A)  >60 mL/min/1.73 m^2 Final   There may be more visits with results that are not included.       EKG  05/09/2023 normal sinus rhythm right bundle-branch block left axis deviation consider left anterior fascicular block nonspecific ST changes abnormal precordial R-wave  progression  07/12/2023 normal sinus rhythm PVCs left axis deviation low-voltage QRS right bundle-branch block inferior infarct age indeterminate cannot rule out anterior infarct age indeterminate    Echo   Results for orders placed or performed during the hospital encounter of 12/15/22   Echo   Result Value Ref Range    BSA 2.26 m2    TDI SEPTAL 0.06 m/s    LV LATERAL E/E' RATIO 3.36 m/s    LV SEPTAL E/E' RATIO 6.17 m/s    Left Ventricular Outflow Tract Mean Velocity 0.74 cm/s    Left Ventricular Outflow Tract Mean Gradient 2.35 mmHg    AORTIC VALVE CUSP SEPERATION 1.69 cm    TDI LATERAL 0.11 m/s    PV PEAK VELOCITY 1.15 cm/s    LVIDd 5.13 3.5 - 6.0 cm    IVS 1.31 (A) 0.6 - 1.1 cm    Posterior Wall 1.32 (A) 0.6 - 1.1 cm    Ao root annulus 3.57 cm    LVIDs 4.01 (A) 2.1 - 4.0 cm    FS 22 28 - 44 %    LV mass 277.06 g    RVDD 3.24 cm    Left Ventricle Relative Wall Thickness 0.51 cm    AV Velocity Ratio 0.71     MV valve area p 1/2 method 2.76 cm2    E/A ratio 0.56     Mean e' 0.09 m/s    E wave deceleration time 274.49 msec    IVRT 72.31 msec    LVOT diameter 1.94 cm    LVOT area 3.0 cm2    LVOT peak joao 0.97 m/s    LVOT peak VTI 23.20 cm    Ao peak joao 1.36 m/s    LVOT stroke volume 68.54 cm3    AV peak gradient 7 mmHg    E/E' ratio 4.35 m/s    MV Peak E Joao 0.37 m/s    TR Max Joao 2.83 m/s    MV stenosis pressure 1/2 time 79.60 ms    MV Peak A Joao 0.66 m/s    LV Systolic Volume 70.32 mL    LV Systolic Volume Index 31.3 mL/m2    LV Diastolic Volume 125.27 mL    LV Diastolic Volume Index 55.68 mL/m2    LV Mass Index 123 g/m2    RA Major Axis 5.09 cm    Left Atrium Minor Axis 3.29 cm    Left Atrium Major Axis 6.12 cm    Triscuspid Valve Regurgitation Peak Gradient 32 mmHg    LA Volume Index (Mod) 22.2 mL/m2    LA volume (mod) 49.99 cm3    RA Width 2.10 cm    EF 60 %    Proximal aorta 3.60 cm    Narrative    · The left ventricle is normal in size with mild concentric hypertrophy   and normal systolic function.  · The  estimated ejection fraction is 60%. Technically difficult study. EF   is based on limited views with poor endocardial border visualization.  · Indeterminate left ventricular diastolic function.  · Normal right ventricular size with normal right ventricular systolic   function.  · PASP is 32 plus central venous pressure which could not be determined.  · The ascending aorta is mildly dilated.          Imaging  No results found.    Prior coronary angiogram / intervention:  See HPI    Assessment and Plan  1. Syncope, orthostatic / autonomic dysfunction  See regimen in HPI  Follow up with Neurology  Needs to be on Toprol for VT/NSVT  Has midodrine p.r.n. and is on fludrocortisone  Scheduled IVF infusions and p.o. appreciate nephrology assistance  Gastroenterology is attempting to prevent watery stool  PCP is placed the patient on oxybutynin to see if this helps  Continue current medication regimen for orthostatic hypotension/autonomic dysfunction/hypertension    2. Orthostatic hypotension /autonomic dysfunction  As above    3. Essential hypertension  As above  Patient has labile hypertension with frequent hypotension very complex as in HPI  Continue Toprol with hydralazine p.r.n.     4. Coronary artery disease involving native coronary artery of native heart without angina pectoris  Continue aspirin 81 restart Pravachol 40  Evidently there are allergies to Crestor and Lipitor.  These allergies are unknown.    5. Prolonged QT  Avoid QT prolonging agents     6. Hyperlipidemia, unspecified hyperlipidemia type  Restart 40 of pravastatin    7. VT (ventricular tachycardia) +/- pAF?  The patient has had implanted LINQ device  Continue BB  Do not have enough evidence of/for PAF per electrophysiology  See HPI  Follow up with electrophysiology as he is now established    8. Ascending aortic aneurysm, unspecified whether ruptured  Control hypertension on beta-blocker  Serial monitoring difficult with advanced CKD    9. Carotid  stenosis  Not hemodynamically significant    Total professional time spent for the encounter: 40 minutes  Time was spent preparing to see the patient, reviewing results of prior testing, obtaining and/or reviewing separately obtained history, performing a medically appropriate examination and interview, counseling and educating the patient/family, ordering medications/tests/procedures, referring and communicating with other health care professionals, documenting clinical information in the electronic health record, and independently interpreting results.     Follow Up  3 month      Aj Marrufo MD, FACC, RPVI  Interventional Cardiology

## 2023-08-04 ENCOUNTER — INFUSION (OUTPATIENT)
Dept: INFECTIOUS DISEASES | Facility: HOSPITAL | Age: 73
End: 2023-08-04
Attending: INTERNAL MEDICINE
Payer: MEDICARE

## 2023-08-04 VITALS
HEIGHT: 74 IN | WEIGHT: 213.5 LBS | DIASTOLIC BLOOD PRESSURE: 88 MMHG | BODY MASS INDEX: 27.4 KG/M2 | SYSTOLIC BLOOD PRESSURE: 190 MMHG | HEART RATE: 64 BPM | OXYGEN SATURATION: 99 % | TEMPERATURE: 98 F | RESPIRATION RATE: 20 BRPM

## 2023-08-04 DIAGNOSIS — E11.22 TYPE 2 DIABETES MELLITUS WITH STAGE 3B CHRONIC KIDNEY DISEASE, WITHOUT LONG-TERM CURRENT USE OF INSULIN: ICD-10-CM

## 2023-08-04 DIAGNOSIS — E86.0 DEHYDRATION: Primary | ICD-10-CM

## 2023-08-04 DIAGNOSIS — N18.32 TYPE 2 DIABETES MELLITUS WITH STAGE 3B CHRONIC KIDNEY DISEASE, WITHOUT LONG-TERM CURRENT USE OF INSULIN: ICD-10-CM

## 2023-08-04 DIAGNOSIS — Z93.3 COLOSTOMY PRESENT: ICD-10-CM

## 2023-08-04 PROCEDURE — 96365 THER/PROPH/DIAG IV INF INIT: CPT | Mod: HCNC

## 2023-08-04 PROCEDURE — 25000003 PHARM REV CODE 250: Mod: HCNC | Performed by: NURSE PRACTITIONER

## 2023-08-04 RX ORDER — SODIUM CHLORIDE 0.9 % (FLUSH) 0.9 %
10 SYRINGE (ML) INJECTION
Status: CANCELLED | OUTPATIENT
Start: 2023-08-08

## 2023-08-04 RX ORDER — HEPARIN 100 UNIT/ML
500 SYRINGE INTRAVENOUS
Status: CANCELLED | OUTPATIENT
Start: 2023-08-08

## 2023-08-04 RX ADMIN — SODIUM CHLORIDE 1000 ML: 9 INJECTION, SOLUTION INTRAVENOUS at 08:08

## 2023-08-04 NOTE — PROGRESS NOTES
Limited head-to-toe assessment due to privacy issues and visit reason though the opportunity was given for patient to express any concerns     Pt received 1L NS bolus, tolerated well. Pt with hx of HTN and fluctuating BP. Denies s/s stroke/HA. Cardiologist saw pt this week and is aware of BP history.

## 2023-08-07 ENCOUNTER — CLINICAL SUPPORT (OUTPATIENT)
Dept: CARDIOLOGY | Facility: HOSPITAL | Age: 73
End: 2023-08-07
Payer: MEDICARE

## 2023-08-07 DIAGNOSIS — Z95.818 PRESENCE OF OTHER CARDIAC IMPLANTS AND GRAFTS: ICD-10-CM

## 2023-08-07 PROCEDURE — G2066 INTER DEVC REMOTE 30D: HCPCS | Mod: HCNC | Performed by: INTERNAL MEDICINE

## 2023-08-08 ENCOUNTER — INFUSION (OUTPATIENT)
Dept: INFECTIOUS DISEASES | Facility: HOSPITAL | Age: 73
End: 2023-08-08
Payer: MEDICARE

## 2023-08-08 VITALS
BODY MASS INDEX: 27.16 KG/M2 | HEART RATE: 66 BPM | HEIGHT: 74 IN | WEIGHT: 211.63 LBS | RESPIRATION RATE: 20 BRPM | TEMPERATURE: 99 F | OXYGEN SATURATION: 99 % | DIASTOLIC BLOOD PRESSURE: 94 MMHG | SYSTOLIC BLOOD PRESSURE: 180 MMHG

## 2023-08-08 DIAGNOSIS — Z93.3 COLOSTOMY PRESENT: ICD-10-CM

## 2023-08-08 DIAGNOSIS — N18.32 TYPE 2 DIABETES MELLITUS WITH STAGE 3B CHRONIC KIDNEY DISEASE, WITHOUT LONG-TERM CURRENT USE OF INSULIN: ICD-10-CM

## 2023-08-08 DIAGNOSIS — E86.0 DEHYDRATION: Primary | ICD-10-CM

## 2023-08-08 DIAGNOSIS — E11.22 TYPE 2 DIABETES MELLITUS WITH STAGE 3B CHRONIC KIDNEY DISEASE, WITHOUT LONG-TERM CURRENT USE OF INSULIN: ICD-10-CM

## 2023-08-08 PROCEDURE — 96360 HYDRATION IV INFUSION INIT: CPT | Mod: HCNC

## 2023-08-08 PROCEDURE — 25000003 PHARM REV CODE 250: Mod: HCNC | Performed by: NURSE PRACTITIONER

## 2023-08-08 RX ORDER — SODIUM CHLORIDE 0.9 % (FLUSH) 0.9 %
10 SYRINGE (ML) INJECTION
Status: CANCELLED | OUTPATIENT
Start: 2023-08-11

## 2023-08-08 RX ORDER — HEPARIN 100 UNIT/ML
500 SYRINGE INTRAVENOUS
Status: CANCELLED | OUTPATIENT
Start: 2023-08-11

## 2023-08-08 RX ORDER — SODIUM CHLORIDE 0.9 % (FLUSH) 0.9 %
10 SYRINGE (ML) INJECTION
Status: DISCONTINUED | OUTPATIENT
Start: 2023-08-08 | End: 2023-08-08 | Stop reason: HOSPADM

## 2023-08-08 RX ADMIN — SODIUM CHLORIDE 1000 ML: 9 INJECTION, SOLUTION INTRAVENOUS at 08:08

## 2023-08-08 NOTE — PROGRESS NOTES
Limited head-to-toe assessment due to privacy issues and visit reason though the opportunity was given for patient to express any concerns     Pt received 1L NS bolus as ordered    Patient was reluctant to discuss his answer to the depression screening as he states that one of the providers placed a PEC (Physician Emergency Consent) on him and he was placed in a psychiatric hospital for a week without contact to his job and he lost his employment because of it.

## 2023-08-15 ENCOUNTER — INFUSION (OUTPATIENT)
Dept: INFECTIOUS DISEASES | Facility: HOSPITAL | Age: 73
End: 2023-08-15
Payer: MEDICARE

## 2023-08-15 VITALS
HEART RATE: 65 BPM | OXYGEN SATURATION: 98 % | BODY MASS INDEX: 27.37 KG/M2 | RESPIRATION RATE: 16 BRPM | DIASTOLIC BLOOD PRESSURE: 83 MMHG | SYSTOLIC BLOOD PRESSURE: 133 MMHG | WEIGHT: 213.19 LBS | TEMPERATURE: 98 F

## 2023-08-15 DIAGNOSIS — Z93.3 COLOSTOMY PRESENT: ICD-10-CM

## 2023-08-15 DIAGNOSIS — E86.0 DEHYDRATION: Primary | ICD-10-CM

## 2023-08-15 DIAGNOSIS — N18.32 TYPE 2 DIABETES MELLITUS WITH STAGE 3B CHRONIC KIDNEY DISEASE, WITHOUT LONG-TERM CURRENT USE OF INSULIN: ICD-10-CM

## 2023-08-15 DIAGNOSIS — E11.22 TYPE 2 DIABETES MELLITUS WITH STAGE 3B CHRONIC KIDNEY DISEASE, WITHOUT LONG-TERM CURRENT USE OF INSULIN: ICD-10-CM

## 2023-08-15 PROCEDURE — 25000003 PHARM REV CODE 250: Mod: HCNC | Performed by: NURSE PRACTITIONER

## 2023-08-15 PROCEDURE — 96360 HYDRATION IV INFUSION INIT: CPT | Mod: HCNC

## 2023-08-15 RX ORDER — HEPARIN 100 UNIT/ML
500 SYRINGE INTRAVENOUS
Status: CANCELLED | OUTPATIENT
Start: 2023-08-18

## 2023-08-15 RX ORDER — SODIUM CHLORIDE 0.9 % (FLUSH) 0.9 %
10 SYRINGE (ML) INJECTION
Status: CANCELLED | OUTPATIENT
Start: 2023-08-18

## 2023-08-15 RX ADMIN — SODIUM CHLORIDE 1000 ML: 9 INJECTION, SOLUTION INTRAVENOUS at 08:08

## 2023-08-15 NOTE — PROGRESS NOTES
Limited head-to-toe assessment due to privacy issues and visit reason though the opportunity was given for patient to express any concerns     Pt received 1L NS bolus over 1 hr., tolerated well.

## 2023-08-16 ENCOUNTER — OFFICE VISIT (OUTPATIENT)
Dept: ORTHOPEDICS | Facility: CLINIC | Age: 73
End: 2023-08-16
Payer: MEDICARE

## 2023-08-16 VITALS
BODY MASS INDEX: 27.42 KG/M2 | HEIGHT: 74 IN | DIASTOLIC BLOOD PRESSURE: 102 MMHG | HEART RATE: 86 BPM | SYSTOLIC BLOOD PRESSURE: 165 MMHG | WEIGHT: 213.63 LBS

## 2023-08-16 DIAGNOSIS — M19.072 ARTHRITIS OF LEFT FOOT: Primary | ICD-10-CM

## 2023-08-16 PROCEDURE — 1101F PR PT FALLS ASSESS DOC 0-1 FALLS W/OUT INJ PAST YR: ICD-10-PCS | Mod: HCNC,CPTII,S$GLB,

## 2023-08-16 PROCEDURE — 3288F FALL RISK ASSESSMENT DOCD: CPT | Mod: HCNC,CPTII,S$GLB,

## 2023-08-16 PROCEDURE — 99999 PR PBB SHADOW E&M-EST. PATIENT-LVL V: CPT | Mod: PBBFAC,HCNC,,

## 2023-08-16 PROCEDURE — 3077F PR MOST RECENT SYSTOLIC BLOOD PRESSURE >= 140 MM HG: ICD-10-PCS | Mod: HCNC,CPTII,S$GLB,

## 2023-08-16 PROCEDURE — 3044F PR MOST RECENT HEMOGLOBIN A1C LEVEL <7.0%: ICD-10-PCS | Mod: HCNC,CPTII,S$GLB,

## 2023-08-16 PROCEDURE — 99213 OFFICE O/P EST LOW 20 MIN: CPT | Mod: HCNC,S$GLB,,

## 2023-08-16 PROCEDURE — 99213 PR OFFICE/OUTPT VISIT, EST, LEVL III, 20-29 MIN: ICD-10-PCS | Mod: HCNC,S$GLB,,

## 2023-08-16 PROCEDURE — 3288F PR FALLS RISK ASSESSMENT DOCUMENTED: ICD-10-PCS | Mod: HCNC,CPTII,S$GLB,

## 2023-08-16 PROCEDURE — 3044F HG A1C LEVEL LT 7.0%: CPT | Mod: HCNC,CPTII,S$GLB,

## 2023-08-16 PROCEDURE — 3066F PR DOCUMENTATION OF TREATMENT FOR NEPHROPATHY: ICD-10-PCS | Mod: HCNC,CPTII,S$GLB,

## 2023-08-16 PROCEDURE — 99999 PR PBB SHADOW E&M-EST. PATIENT-LVL V: ICD-10-PCS | Mod: PBBFAC,HCNC,,

## 2023-08-16 PROCEDURE — 3008F BODY MASS INDEX DOCD: CPT | Mod: HCNC,CPTII,S$GLB,

## 2023-08-16 PROCEDURE — 3080F DIAST BP >= 90 MM HG: CPT | Mod: HCNC,CPTII,S$GLB,

## 2023-08-16 PROCEDURE — 1159F MED LIST DOCD IN RCRD: CPT | Mod: HCNC,CPTII,S$GLB,

## 2023-08-16 PROCEDURE — 3077F SYST BP >= 140 MM HG: CPT | Mod: HCNC,CPTII,S$GLB,

## 2023-08-16 PROCEDURE — 3080F PR MOST RECENT DIASTOLIC BLOOD PRESSURE >= 90 MM HG: ICD-10-PCS | Mod: HCNC,CPTII,S$GLB,

## 2023-08-16 PROCEDURE — 3008F PR BODY MASS INDEX (BMI) DOCUMENTED: ICD-10-PCS | Mod: HCNC,CPTII,S$GLB,

## 2023-08-16 PROCEDURE — 1101F PT FALLS ASSESS-DOCD LE1/YR: CPT | Mod: HCNC,CPTII,S$GLB,

## 2023-08-16 PROCEDURE — 1159F PR MEDICATION LIST DOCUMENTED IN MEDICAL RECORD: ICD-10-PCS | Mod: HCNC,CPTII,S$GLB,

## 2023-08-16 PROCEDURE — 1125F PR PAIN SEVERITY QUANTIFIED, PAIN PRESENT: ICD-10-PCS | Mod: HCNC,CPTII,S$GLB,

## 2023-08-16 PROCEDURE — 1125F AMNT PAIN NOTED PAIN PRSNT: CPT | Mod: HCNC,CPTII,S$GLB,

## 2023-08-16 PROCEDURE — 3066F NEPHROPATHY DOC TX: CPT | Mod: HCNC,CPTII,S$GLB,

## 2023-08-16 NOTE — PROGRESS NOTES
Patient ID: Jarrett Charlton Jr. is a 73 y.o. male    CC: left ankle pain      History of Present Illness:    Jarrett Charlton Jr. presents to clinic for left ankle pain. Patient denies known PETE. The pain started 5 months ago and is becoming progressively worse.  Pain is located over (points to) medial ankle. He reports that the pain is a 3 /10 sharp pain today. The pain is affecting ADLs and limiting desired level of activity. Denies numbness, tingling, radiation and inability to bear weight.  Pain is 7 /10 at its worst. Reports pain worsened with certain movements. Also endorses nighttime symptoms.  Here today as he is concerned he has a blood clot due to pain.    He has not tried anything to help with pain.    Occupation: retired    Ambulating: unassisted  Diabetic: +  Lab Results   Component Value Date    HGBA1C 6.3 (H) 06/26/2023     Smoking: no  Hx of DVT/PE: no  + daily ASA    ____________________________________________________________________    Interval history 8/16/2023: Patient returns today for follow up of left ankle pain.  Reports 10/10 pain today. No recent injury. States pain is worsened with ambulation, he wears [facility] [employer] ortho shoes. No insoles.       PAST MEDICAL HISTORY:   Past Medical History:   Diagnosis Date    Basal cell carcinoma     BPH (benign prostatic hyperplasia)     s/p TURP    Carotid stenosis     Chronic kidney disease     Claudication     Coronary artery disease     DDD (degenerative disc disease) 10/21/2013    Diabetes mellitus with renal complications     Disc disease, degenerative, cervical     Encounter for blood transfusion     GERD (gastroesophageal reflux disease)     Gout, chronic     History of ulcerative colitis     s/p colectomy and ileostomy    HLD (hyperlipidemia)     HTN (hypertension)     Ileostomy in place 1982    RBBB     Squamous cell carcinoma 03/08/2018    Left superior helix near insertion    Squamous cell carcinoma 04/12/2018    Left forearm x 5     Ventricular tachycardia      PAST SURGICAL HISTORY:   Past Surgical History:   Procedure Laterality Date    cardiac stents      CATARACT EXTRACTION Bilateral     CERVICAL FUSION      CHOLECYSTECTOMY N/A 2/14/2023    Procedure: CHOLECYSTECTOMY;  Surgeon: Mike Joyce MD;  Location: [facility];  Service: General;  Laterality: N/A;  very high probabilty of converison to open    colectomy and ileostomy  1985    ENDOSCOPIC ULTRASOUND OF UPPER GASTROINTESTINAL TRACT N/A 2/10/2023    Procedure: ULTRASOUND, UPPER GI TRACT, ENDOSCOPIC;  Surgeon: Poli Fuller MD;  Location: [facility] ENDO;  Service: Endoscopy;  Laterality: N/A;    ERCP N/A 2/10/2023    Procedure: ERCP (ENDOSCOPIC RETROGRADE CHOLANGIOPANCREATOGRAPHY);  Surgeon: Poli Fuller MD;  Location: [facility];  Service: Endoscopy;  Laterality: N/A;    EXCISION OF PAROTID GLAND Left 12/18/2020    Procedure: EXCISION, PAROTID GLAND;  Surgeon: Michael Pinzon MD;  Location: [facility] 2ND FLR;  Service: ENT;  Laterality: Left;    INSERTION OF IMPLANTABLE LOOP RECORDER  06/07/2021    INSERTION OF IMPLANTABLE LOOP RECORDER Left 6/7/2021    Procedure: INSERTION, IMPLANTABLE LOOP RECORDER;  Surgeon: Romario Dao MD;  Location: [facility] CATH LAB;  Service: Cardiology;  Laterality: Left;    LEFT HEART CATHETERIZATION Right 4/15/2021    Procedure: CATHETERIZATION, HEART, LEFT;  Surgeon: Marcio Jones MD;  Location: [facility] CATH LAB;  Service: Cardiology;  Laterality: Right;    LYSIS OF ADHESIONS N/A 11/9/2020    Procedure: LYSIS, ADHESIONS,  ERAS low;  Surgeon: CED Haley MD;  Location: [facility] OR 2ND FLR;  Service: Colon and Rectal;  Laterality: N/A;    LYSIS OF ADHESIONS N/A 2/14/2023    Procedure: LYSIS, ADHESIONS;  Surgeon: Mike Joyce MD;  Location: [facility] OR;  Service: General;  Laterality: N/A;    POUCHOSCOPY N/A 4/6/2022    Procedure: ENDOSCOPY, POUCH, SMALL INTESTINE, DIAGNOSTIC;  Surgeon: Dieter Juarez MD;  Location: [facility] (2ND FLR);  Service:  Endoscopy;  Laterality: N/A;    REPAIR, HERNIA, PARASTOMAL N/A 11/9/2020    Procedure: REPAIR, HERNIA, PARASTOMAL;  Surgeon: CED Haley MD;  Location: [facility] OR 2ND FLR;  Service: Colon and Rectal;  Laterality: N/A;    TRANSURETHRAL RESECTION OF PROSTATE (TURP) WITHOUT USE OF LASER N/A 1/23/2019    Procedure: TURP, WITHOUT USING LASER BIPOLAR;  Surgeon: Catarino Mota MD;  Location: [facility] OR 1ST FLR;  Service: Urology;  Laterality: N/A;  1.5 HOURS     FAMILY HISTORY:   Family History   Problem Relation Age of Onset    Cancer Father     Diabetes Father     Dementia Mother     Melanoma Neg Hx     Hypertension Neg Hx     Arthritis Neg Hx      SOCIAL HISTORY:   Social History     Occupational History    Occupation:  x 44 years     Comment: Retired    Occupation: [employer]   Tobacco Use    Smoking status: Never    Smokeless tobacco: Never   Substance and Sexual Activity    Alcohol use: No    Drug use: No    Sexual activity: Not Currently     Partners: Female        MEDICATIONS:   Current Outpatient Medications:     ACCU-CHEK PAUL CONTROL SOLN Soln, 1 drop by NOT APPLICABLE route once daily., Disp: , Rfl:     ACCU-CHEK SOFTCLIX LANCETS [facility], TEST BLOOD SUGAR THREE TIMES DAILY, Disp: 300 each, Rfl: 3    allopurinoL (ZYLOPRIM) 300 MG tablet, Take 1.5 tablets (450 mg total) by mouth once daily., Disp: 135 tablet, Rfl: 3    blood sugar diagnostic (ONETOUCH ULTRA TEST) Strp, USE ONE STRIP TO CHECK GLUCOSE EVERY DAY, Disp: 100 each, Rfl: 11    blood-glucose meter (ACCU-CHEK GUIDE GLUCOSE METER) [facility], Accucheck BID, Disp: 1 each, Rfl: 0    cholestyramine-aspartame (QUESTRAN LIGHT) 4 gram PwPk, Take 1 packet (4 g total) by mouth 3 (three) times daily with meals., Disp: 270 packet, Rfl: 1    DROPSAFE ALCOHOL PREP PADS PadM, USE TOPICALLY 5 (FIVE) TIMES DAILY., Disp: 100 each, Rfl: 5    fludrocortisone (FLORINEF) 0.1 mg Tab, Take 1 tablet (100 mcg total) by mouth once daily., Disp: 180 tablet,  Rfl: 0    glimepiride (AMARYL) 4 MG tablet, TAKE 1 TABLET (4 MG TOTAL) BY MOUTH 2 (TWO) TIMES DAILY BEFORE MEALS., Disp: 180 tablet, Rfl: 1    hydrALAZINE (APRESOLINE) 25 MG tablet, Take 1 tablet (25 mg total) by mouth 3 (three) times daily as needed (for high BP systolic >180 or diastolic > 100)., Disp: 90 tablet, Rfl: 1    LIDOcaine (LIDODERM) 5 %, Place 1 patch onto the skin as needed. Remove & Discard patch within 12 hours or as directed by MD, Disp: 30 patch, Rfl: 0    magnesium oxide 400 mg magnesium Tab, 1 po qdaily, Disp: 90 tablet, Rfl: 3    metoprolol succinate (TOPROL-XL) 25 MG 24 hr tablet, Take 1 tablet (25 mg total) by mouth once daily., Disp: 90 tablet, Rfl: 1    pantoprazole (PROTONIX) 40 MG tablet, Take 1 tablet (40 mg total) by mouth once daily., Disp: 90 tablet, Rfl: 0    pravastatin (PRAVACHOL) 40 MG tablet, TAKE 1 TABLET ONE TIME DAILY, Disp: 90 tablet, Rfl: 1    aspirin (ECOTRIN) 81 MG EC tablet, Take 1 tablet (81 mg total) by mouth once daily., Disp: 30 tablet, Rfl: 1    calcium-vitamin D3 (OS-DAKOTA 500 + D3) 500 mg-5 mcg (200 unit) per tablet, Take 1 tablet by mouth once daily., Disp: 30 tablet, Rfl: 1    gabapentin (NEURONTIN) 100 MG capsule, Take 2 capsules (200 mg total) by mouth 3 (three) times daily., Disp: 180 capsule, Rfl: 1    midodrine (PROAMATINE) 10 MG tablet, Take 1 tablet (10 mg total) by mouth daily as needed (Hypotension). (Patient not taking: Reported on 8/1/2023), Disp: 30 tablet, Rfl: 0  No current facility-administered medications for this visit.    Facility-Administered Medications Ordered in Other Visits:     sodium chloride 0.9% in 1,000 mL infusion, , Intravenous, Continuous, Order, Paper  ALLERGIES:   Review of patient's allergies indicates:   Allergen Reactions    Crestor [rosuvastatin]     Lipitor [atorvastatin]          Physical Exam     Vitals:    08/16/23 1104   BP: (!) 165/102   Pulse: 86       Alert and oriented to person, place and time. No acute distress.  Well-groomed, not ill appearing. Pupils round and reactive, normal respiratory effort, no audible wheezing.     left Foot and Ankle Exam    INSPECTION:        Gait:    Antalgic     Scars:   None     Color:   Normal      Atrophy:  None      Heel / Toe Walking: Did not assess    ALIGNMENT:    Hindfoot  Normal  Forefoot  Normal                        TENDERNESS:       Sinus tarsi:    none    Syndesmosis:    none    ATFL:     none           Fibula:     none  Peroneal tendons:   none        Talonavicular:    +   Anterior tibialis:   none   Anterolateral joint line:  +  Anteromedial joint line:  none          Deltoid:    none    Malleolus:    +    PTT:     +    Navicular:    none       Achilles    none  Calcaneal Insertion   none  Retrocalcaneus   none        RANGE OF MOTION:     Ankle DF/PF:    15/45        Eversion/Inversion:   15/25     Midfoot ABD/ADD:   10/10     First MTP DF/PF:   60/25    STRENGTH    Peroneals:  5/5  Anterior tibialis: 5/5  Posterior tibialis: 5/5  Gastroc-soleus: 5/5  EHL:   5/5  FHL:   5/5             SPECIAL TESTS:    Anterior drawer:   Normal      (C-W contralateral side)          Forced DF/ER: No pain at syndesmosis.  Mid-leg squeeze  No pain at syndesmosis  Forced DF:  No pain anterior joint line.    Forced PF:  No pain posterior ankle.   Forced INV:  No pain lateral  Forced EV:  No pain medial   Gonzalezs sign: Normal ankle plantar flexion.   Resisted peroneal No subluxation or pain  1st-2nd MT toggle No pain at Lisfranc    Negative Homans, no pain with calf squeeze.  No warmth, erythema, or redness.      Imaging:     Left ankle X-rays ordered/reviewed by me showing no evidence of fracture or dislocation. There is no obvious malalignment. No evidence of masses, lesions or foreign bodies. Joint space narrowing at mid tarsal joint.     Assessment & Plan    Arthritis of left foot  -     Ambulatory referral/consult to Orthopedics; Future; Expected date: 08/23/2023      Discussed with patient  pain is likely due to severe arthritis at mid talsar joint.  He is interested in surgery for this.  We will place referral to foot and ankle.    In the interim he was instructed to purchase longitudinal arch orthosis as well as rocker bottom shoes.  Follow up p.r.n.    Follow up: prn  X-rays next visit: none     All questions were answered and patient is agreeable to the above plan.

## 2023-08-17 ENCOUNTER — OFFICE VISIT (OUTPATIENT)
Dept: DERMATOLOGY | Facility: CLINIC | Age: 73
End: 2023-08-17
Payer: MEDICARE

## 2023-08-17 ENCOUNTER — INFUSION (OUTPATIENT)
Dept: INFECTIOUS DISEASES | Facility: HOSPITAL | Age: 73
End: 2023-08-17
Payer: MEDICARE

## 2023-08-17 VITALS
HEART RATE: 88 BPM | OXYGEN SATURATION: 99 % | SYSTOLIC BLOOD PRESSURE: 173 MMHG | RESPIRATION RATE: 18 BRPM | BODY MASS INDEX: 27.43 KG/M2 | HEIGHT: 74 IN | TEMPERATURE: 98 F | DIASTOLIC BLOOD PRESSURE: 98 MMHG

## 2023-08-17 DIAGNOSIS — Z93.3 COLOSTOMY PRESENT: ICD-10-CM

## 2023-08-17 DIAGNOSIS — Z85.828 HISTORY OF NONMELANOMA SKIN CANCER: ICD-10-CM

## 2023-08-17 DIAGNOSIS — D23.9 DERMATOFIBROMA: ICD-10-CM

## 2023-08-17 DIAGNOSIS — D48.5 NEOPLASM OF UNCERTAIN BEHAVIOR OF SKIN: Primary | ICD-10-CM

## 2023-08-17 DIAGNOSIS — E11.22 TYPE 2 DIABETES MELLITUS WITH STAGE 3B CHRONIC KIDNEY DISEASE, WITHOUT LONG-TERM CURRENT USE OF INSULIN: ICD-10-CM

## 2023-08-17 DIAGNOSIS — L57.0 MULTIPLE ACTINIC KERATOSES: ICD-10-CM

## 2023-08-17 DIAGNOSIS — E86.0 DEHYDRATION: Primary | ICD-10-CM

## 2023-08-17 DIAGNOSIS — N18.32 TYPE 2 DIABETES MELLITUS WITH STAGE 3B CHRONIC KIDNEY DISEASE, WITHOUT LONG-TERM CURRENT USE OF INSULIN: ICD-10-CM

## 2023-08-17 DIAGNOSIS — L82.1 SK (SEBORRHEIC KERATOSIS): ICD-10-CM

## 2023-08-17 PROCEDURE — 25000003 PHARM REV CODE 250: Mod: HCNC | Performed by: NURSE PRACTITIONER

## 2023-08-17 PROCEDURE — 17004 PR DESTRUCTION, PREMALIGNANT LESIONS; 15 OR MORE LESIONS: ICD-10-PCS | Mod: HCNC,S$GLB,, | Performed by: PATHOLOGY

## 2023-08-17 PROCEDURE — 11102 PR TANGENTIAL BIOPSY, SKIN, SINGLE LESION: ICD-10-PCS | Mod: HCNC,XS,S$GLB, | Performed by: PATHOLOGY

## 2023-08-17 PROCEDURE — 3066F NEPHROPATHY DOC TX: CPT | Mod: HCNC,CPTII,S$GLB, | Performed by: PATHOLOGY

## 2023-08-17 PROCEDURE — 88342 IMHCHEM/IMCYTCHM 1ST ANTB: CPT | Mod: HCNC | Performed by: PATHOLOGY

## 2023-08-17 PROCEDURE — 17004 DESTROY PREMAL LESIONS 15/>: CPT | Mod: HCNC,S$GLB,, | Performed by: PATHOLOGY

## 2023-08-17 PROCEDURE — 99999 PR PBB SHADOW E&M-EST. PATIENT-LVL III: CPT | Mod: PBBFAC,HCNC,, | Performed by: PATHOLOGY

## 2023-08-17 PROCEDURE — 1101F PR PT FALLS ASSESS DOC 0-1 FALLS W/OUT INJ PAST YR: ICD-10-PCS | Mod: HCNC,CPTII,S$GLB, | Performed by: PATHOLOGY

## 2023-08-17 PROCEDURE — 88305 TISSUE EXAM BY PATHOLOGIST: CPT | Mod: HCNC | Performed by: PATHOLOGY

## 2023-08-17 PROCEDURE — 99213 PR OFFICE/OUTPT VISIT, EST, LEVL III, 20-29 MIN: ICD-10-PCS | Mod: 25,HCNC,S$GLB, | Performed by: PATHOLOGY

## 2023-08-17 PROCEDURE — 3044F HG A1C LEVEL LT 7.0%: CPT | Mod: HCNC,CPTII,S$GLB, | Performed by: PATHOLOGY

## 2023-08-17 PROCEDURE — 3066F PR DOCUMENTATION OF TREATMENT FOR NEPHROPATHY: ICD-10-PCS | Mod: HCNC,CPTII,S$GLB, | Performed by: PATHOLOGY

## 2023-08-17 PROCEDURE — 1101F PT FALLS ASSESS-DOCD LE1/YR: CPT | Mod: HCNC,CPTII,S$GLB, | Performed by: PATHOLOGY

## 2023-08-17 PROCEDURE — 96360 HYDRATION IV INFUSION INIT: CPT

## 2023-08-17 PROCEDURE — 88342 CHG IMMUNOCYTOCHEMISTRY: ICD-10-PCS | Mod: 26,HCNC,, | Performed by: PATHOLOGY

## 2023-08-17 PROCEDURE — 11102 TANGNTL BX SKIN SINGLE LES: CPT | Mod: HCNC,XS,S$GLB, | Performed by: PATHOLOGY

## 2023-08-17 PROCEDURE — 3044F PR MOST RECENT HEMOGLOBIN A1C LEVEL <7.0%: ICD-10-PCS | Mod: HCNC,CPTII,S$GLB, | Performed by: PATHOLOGY

## 2023-08-17 PROCEDURE — 88342 IMHCHEM/IMCYTCHM 1ST ANTB: CPT | Mod: 26,HCNC,, | Performed by: PATHOLOGY

## 2023-08-17 PROCEDURE — 3288F FALL RISK ASSESSMENT DOCD: CPT | Mod: HCNC,CPTII,S$GLB, | Performed by: PATHOLOGY

## 2023-08-17 PROCEDURE — 88305 TISSUE EXAM BY PATHOLOGIST: CPT | Mod: 26,HCNC,, | Performed by: PATHOLOGY

## 2023-08-17 PROCEDURE — 1126F PR PAIN SEVERITY QUANTIFIED, NO PAIN PRESENT: ICD-10-PCS | Mod: HCNC,CPTII,S$GLB, | Performed by: PATHOLOGY

## 2023-08-17 PROCEDURE — 99999 PR PBB SHADOW E&M-EST. PATIENT-LVL III: ICD-10-PCS | Mod: PBBFAC,HCNC,, | Performed by: PATHOLOGY

## 2023-08-17 PROCEDURE — 1126F AMNT PAIN NOTED NONE PRSNT: CPT | Mod: HCNC,CPTII,S$GLB, | Performed by: PATHOLOGY

## 2023-08-17 PROCEDURE — 3288F PR FALLS RISK ASSESSMENT DOCUMENTED: ICD-10-PCS | Mod: HCNC,CPTII,S$GLB, | Performed by: PATHOLOGY

## 2023-08-17 PROCEDURE — 99213 OFFICE O/P EST LOW 20 MIN: CPT | Mod: 25,HCNC,S$GLB, | Performed by: PATHOLOGY

## 2023-08-17 PROCEDURE — 88305 TISSUE EXAM BY PATHOLOGIST: ICD-10-PCS | Mod: 26,HCNC,, | Performed by: PATHOLOGY

## 2023-08-17 RX ORDER — SODIUM CHLORIDE 0.9 % (FLUSH) 0.9 %
10 SYRINGE (ML) INJECTION
Status: CANCELLED | OUTPATIENT
Start: 2023-08-18

## 2023-08-17 RX ORDER — HEPARIN 100 UNIT/ML
500 SYRINGE INTRAVENOUS
Status: CANCELLED | OUTPATIENT
Start: 2023-08-18

## 2023-08-17 RX ADMIN — SODIUM CHLORIDE 1000 ML: 9 INJECTION, SOLUTION INTRAVENOUS at 09:08

## 2023-08-17 NOTE — PROGRESS NOTES
Pt received bolus of 1L NS over one hour. Pt tolerated well and left in NAD; next appt given to pt;      Limited head-to-toe assessment due to privacy issues and visit reason though the opportunity was given for patient to express any concerns

## 2023-08-17 NOTE — PATIENT INSTRUCTIONS
Wait 3-4 weeks for lesions treated today to heal.  Then;  - Start skin medicinals compound of Chemo cream- 5-Florouracil 5%, Calcipotriene 0.0025%  Disp 30g  Tube with 1 refill, apply to affected area of the ears, lateral face, forearms, hands  x 5 days as instructed on prescription  -Discussed this will be dispensed via compounding pharmacy, they will contact you through your email on file, it will be shipped in the mail    CRYOSURGERY      Your doctor has used a method called cryosurgery to treat your skin condition. Cryosurgery refers to the use of very cold substances to treat a variety of skin conditions such as warts, pre-skin cancers, molluscum contagiosum, sun spots, and several benign growths. The substance we use in cryosurgery is liquid nitrogen and is so cold (-195 degrees Celsius) that is burns when administered.     Following treatment in the office, the skin may immediately burn and become red. You may find the area around the lesion is affected as well. It is sometimes necessary to treat not only the lesion, but a small area of the surrounding normal skin to achieve a good response.     A blister, and even a blood filled blister, may form after treatment.   This is a normal response. If the blister is painful, it is acceptable to sterilize a needle and with rubbing alcohol and gently pop the blister. It is important that you gently wash the area with soap and warm water as the blister fluid may contain wart virus if a wart was treated. Do no remove the roof of the blister.     The area treated can take anywhere from 1-3 weeks to heal. Healing time depends on the kind skin lesion treated, the location, and how aggressively the lesion was treated. It is recommended that the areas treated are covered with Vaseline or bacitracin ointment and a band-aid. If a band-aid is not practical, just ointment applied several times per day will do. Keeping these areas moist will speed the healing time.    Treatment  with liquid nitrogen can leave a scar. In dark skin, it may be a light or dark scar, in light skin it may be a white or pink scar. These will generally fade with time, but may never go away completely.     If you have any concerns after your treatment, please feel free to call the office.       [facility] [place] [address]/ [phone] FAX/ [email or web address]

## 2023-08-22 ENCOUNTER — INFUSION (OUTPATIENT)
Dept: INFECTIOUS DISEASES | Facility: HOSPITAL | Age: 73
End: 2023-08-22
Payer: MEDICARE

## 2023-08-22 VITALS
HEART RATE: 94 BPM | HEIGHT: 74 IN | DIASTOLIC BLOOD PRESSURE: 105 MMHG | RESPIRATION RATE: 18 BRPM | WEIGHT: 210.13 LBS | TEMPERATURE: 98 F | SYSTOLIC BLOOD PRESSURE: 185 MMHG | BODY MASS INDEX: 26.97 KG/M2 | OXYGEN SATURATION: 98 %

## 2023-08-22 DIAGNOSIS — N18.32 TYPE 2 DIABETES MELLITUS WITH STAGE 3B CHRONIC KIDNEY DISEASE, WITHOUT LONG-TERM CURRENT USE OF INSULIN: ICD-10-CM

## 2023-08-22 DIAGNOSIS — E11.22 TYPE 2 DIABETES MELLITUS WITH STAGE 3B CHRONIC KIDNEY DISEASE, WITHOUT LONG-TERM CURRENT USE OF INSULIN: ICD-10-CM

## 2023-08-22 DIAGNOSIS — E86.0 DEHYDRATION: Primary | ICD-10-CM

## 2023-08-22 DIAGNOSIS — Z93.3 COLOSTOMY PRESENT: ICD-10-CM

## 2023-08-22 LAB
FINAL PATHOLOGIC DIAGNOSIS: NORMAL
GROSS: NORMAL
Lab: NORMAL
MICROSCOPIC EXAM: NORMAL

## 2023-08-22 PROCEDURE — 96360 HYDRATION IV INFUSION INIT: CPT | Mod: HCNC

## 2023-08-22 PROCEDURE — 25000003 PHARM REV CODE 250: Mod: HCNC | Performed by: NURSE PRACTITIONER

## 2023-08-22 RX ORDER — SODIUM CHLORIDE 0.9 % (FLUSH) 0.9 %
10 SYRINGE (ML) INJECTION
Status: DISCONTINUED | OUTPATIENT
Start: 2023-08-22 | End: 2023-08-22 | Stop reason: HOSPADM

## 2023-08-22 RX ORDER — HEPARIN 100 UNIT/ML
500 SYRINGE INTRAVENOUS
Status: CANCELLED | OUTPATIENT
Start: 2023-08-25

## 2023-08-22 RX ORDER — SODIUM CHLORIDE 0.9 % (FLUSH) 0.9 %
10 SYRINGE (ML) INJECTION
Status: CANCELLED | OUTPATIENT
Start: 2023-08-25

## 2023-08-22 RX ADMIN — SODIUM CHLORIDE 1000 ML: 9 INJECTION, SOLUTION INTRAVENOUS at 08:08

## 2023-08-22 NOTE — PROGRESS NOTES
Pt here for 1 Liter NS bolus. Future appointments scheduled.     Limited head-to-toe assessment due to privacy issues and visit reason though the opportunity was given for patient to express any concerns

## 2023-08-23 RX ORDER — ISOPROPYL ALCOHOL 70 ML/100ML
SWAB TOPICAL
Qty: 140 EACH | Refills: 1 | Status: SHIPPED | OUTPATIENT
Start: 2023-08-23 | End: 2023-11-06

## 2023-08-23 NOTE — TELEPHONE ENCOUNTER
No care due was identified.  [facility] Embedded Care Due Messages. Reference number: [id number].   8/23/2023 2:20:45 AM CDT

## 2023-08-24 ENCOUNTER — TELEPHONE (OUTPATIENT)
Dept: PRIMARY CARE CLINIC | Facility: CLINIC | Age: 73
End: 2023-08-24
Payer: MEDICARE

## 2023-08-24 ENCOUNTER — PATIENT MESSAGE (OUTPATIENT)
Dept: ORTHOPEDICS | Facility: CLINIC | Age: 73
End: 2023-08-24
Payer: MEDICARE

## 2023-08-24 NOTE — TELEPHONE ENCOUNTER
----- Message from Jess Silverio sent at 8/24/2023  4:22 PM CDT -----  Contact: Patient, [phone]  Calling to request Dr Gonzalez review the CT Scan from the ER today. Please call him. Thanks.

## 2023-08-25 ENCOUNTER — PATIENT MESSAGE (OUTPATIENT)
Dept: PRIMARY CARE CLINIC | Facility: CLINIC | Age: 73
End: 2023-08-25
Payer: MEDICARE

## 2023-08-25 ENCOUNTER — TELEPHONE (OUTPATIENT)
Dept: CARDIOTHORACIC SURGERY | Facility: CLINIC | Age: 73
End: 2023-08-25
Payer: MEDICARE

## 2023-08-25 ENCOUNTER — TELEPHONE (OUTPATIENT)
Dept: DERMATOLOGY | Facility: CLINIC | Age: 73
End: 2023-08-25
Payer: MEDICARE

## 2023-08-25 ENCOUNTER — TELEPHONE (OUTPATIENT)
Dept: CARDIOLOGY | Facility: CLINIC | Age: 73
End: 2023-08-25
Payer: MEDICARE

## 2023-08-25 NOTE — TELEPHONE ENCOUNTER
Returned call to pt and scheduled his TAA consult with Dr. Reyes on 8/28. Pt provided with appt time and location, which he verbalized understanding to.    ----- Message from Debbie Lozada PA-C sent at 8/25/2023  2:21 PM CDT -----    ----- Message -----  From: Pedro Krause  Sent: 8/25/2023   2:09 PM CDT  To: [facility] Thoracic Surgery Clinical Support    Type:  Patient Returning Call    Who Called:pt  Who Left Message for Patient:  Does the patient know what this is regarding?:appt  Would the patient rather a call back or a response via [facility]? Call  Best Call Back Number:[phone]   Additional Information: pt states he would like to speak to someone in department to schedule an appt.

## 2023-08-25 NOTE — TELEPHONE ENCOUNTER
Dr. Gonzalez patient went to ER on yesterday and is now back in the ER due to his blood pressure being very low. Patient had a CT scan and they saw a black spot on his chest per patient and they sent in a referral to the cardiologist. Patient have an appointment on Monday with the cardiologist. He want you to look at his notes from the ER and to see everything. He said his magnesium was low to.

## 2023-08-26 NOTE — TELEPHONE ENCOUNTER
----- Message from Martha Carlos sent at 8/25/2023  1:29 PM CDT -----  Regarding: Speak to staff  Contact: Jarrett  Regarding: Speak to staff        Name Of Caller: Jarrett        Contact Preference: [phone] (home)              Nature of call:  pt is calling to speak to staff regarding his low BP, was just released from hospital on yesterday.  and would like to speak with doctor. Please advise.                
Discussed patient's episode of hypotension.    He has been having diarrhea.    He presented to the emergency room for IV fluids and was discharged tonight.    He is now back at baseline.    Reviewed medications to be sure that he is taking as prescribed.    Again discussed hydration.    He may hold his metoprolol if he develops diarrhea but he must call me if he holds the metoprolol.    He will call me next week to give me an update.  
Patient is calling to report low BP 82/67 and dizziness. Patient is currently at ER. Patient is taking Midodrine 10 mg TID and Metoprolol 25 mg day. Was asking to speak with Dr Marrufo   
Detail Level: Generalized
Detail Level: Zone
Detail Level: Simple
Quality 137: Melanoma: Continuity Of Care - Recall System: Patient information entered into a recall system that includes: target date for the next exam specified AND a process to follow up with patients regarding missed or unscheduled appointments
When Should The Patient Follow-Up For Their Next Full-Body Skin Exam?: 1 Year

## 2023-08-28 ENCOUNTER — OFFICE VISIT (OUTPATIENT)
Dept: CARDIOTHORACIC SURGERY | Facility: CLINIC | Age: 73
End: 2023-08-28
Payer: MEDICARE

## 2023-08-28 ENCOUNTER — TELEPHONE (OUTPATIENT)
Dept: CARDIOLOGY | Facility: CLINIC | Age: 73
End: 2023-08-28
Payer: MEDICARE

## 2023-08-28 VITALS
BODY MASS INDEX: 27.49 KG/M2 | SYSTOLIC BLOOD PRESSURE: 178 MMHG | HEART RATE: 91 BPM | OXYGEN SATURATION: 97 % | WEIGHT: 214.19 LBS | HEIGHT: 74 IN | DIASTOLIC BLOOD PRESSURE: 108 MMHG

## 2023-08-28 DIAGNOSIS — I71.21 ASCENDING AORTIC ANEURYSM, UNSPECIFIED WHETHER RUPTURED: ICD-10-CM

## 2023-08-28 DIAGNOSIS — I10 ESSENTIAL HYPERTENSION: Primary | ICD-10-CM

## 2023-08-28 DIAGNOSIS — Z76.89 ENCOUNTER TO ESTABLISH CARE: ICD-10-CM

## 2023-08-28 PROCEDURE — 3008F PR BODY MASS INDEX (BMI) DOCUMENTED: ICD-10-PCS | Mod: HCNC,CPTII,S$GLB, | Performed by: THORACIC SURGERY (CARDIOTHORACIC VASCULAR SURGERY)

## 2023-08-28 PROCEDURE — 3066F PR DOCUMENTATION OF TREATMENT FOR NEPHROPATHY: ICD-10-PCS | Mod: HCNC,CPTII,S$GLB, | Performed by: THORACIC SURGERY (CARDIOTHORACIC VASCULAR SURGERY)

## 2023-08-28 PROCEDURE — 3008F BODY MASS INDEX DOCD: CPT | Mod: HCNC,CPTII,S$GLB, | Performed by: THORACIC SURGERY (CARDIOTHORACIC VASCULAR SURGERY)

## 2023-08-28 PROCEDURE — 1101F PR PT FALLS ASSESS DOC 0-1 FALLS W/OUT INJ PAST YR: ICD-10-PCS | Mod: HCNC,CPTII,S$GLB, | Performed by: THORACIC SURGERY (CARDIOTHORACIC VASCULAR SURGERY)

## 2023-08-28 PROCEDURE — 99999 PR PBB SHADOW E&M-EST. PATIENT-LVL IV: CPT | Mod: PBBFAC,HCNC,, | Performed by: THORACIC SURGERY (CARDIOTHORACIC VASCULAR SURGERY)

## 2023-08-28 PROCEDURE — 1159F PR MEDICATION LIST DOCUMENTED IN MEDICAL RECORD: ICD-10-PCS | Mod: HCNC,CPTII,S$GLB, | Performed by: THORACIC SURGERY (CARDIOTHORACIC VASCULAR SURGERY)

## 2023-08-28 PROCEDURE — 3044F HG A1C LEVEL LT 7.0%: CPT | Mod: HCNC,CPTII,S$GLB, | Performed by: THORACIC SURGERY (CARDIOTHORACIC VASCULAR SURGERY)

## 2023-08-28 PROCEDURE — 99999 PR PBB SHADOW E&M-EST. PATIENT-LVL IV: ICD-10-PCS | Mod: PBBFAC,HCNC,, | Performed by: THORACIC SURGERY (CARDIOTHORACIC VASCULAR SURGERY)

## 2023-08-28 PROCEDURE — 3066F NEPHROPATHY DOC TX: CPT | Mod: HCNC,CPTII,S$GLB, | Performed by: THORACIC SURGERY (CARDIOTHORACIC VASCULAR SURGERY)

## 2023-08-28 PROCEDURE — 3288F PR FALLS RISK ASSESSMENT DOCUMENTED: ICD-10-PCS | Mod: HCNC,CPTII,S$GLB, | Performed by: THORACIC SURGERY (CARDIOTHORACIC VASCULAR SURGERY)

## 2023-08-28 PROCEDURE — 3077F PR MOST RECENT SYSTOLIC BLOOD PRESSURE >= 140 MM HG: ICD-10-PCS | Mod: HCNC,CPTII,S$GLB, | Performed by: THORACIC SURGERY (CARDIOTHORACIC VASCULAR SURGERY)

## 2023-08-28 PROCEDURE — 3080F DIAST BP >= 90 MM HG: CPT | Mod: HCNC,CPTII,S$GLB, | Performed by: THORACIC SURGERY (CARDIOTHORACIC VASCULAR SURGERY)

## 2023-08-28 PROCEDURE — 1159F MED LIST DOCD IN RCRD: CPT | Mod: HCNC,CPTII,S$GLB, | Performed by: THORACIC SURGERY (CARDIOTHORACIC VASCULAR SURGERY)

## 2023-08-28 PROCEDURE — 1101F PT FALLS ASSESS-DOCD LE1/YR: CPT | Mod: HCNC,CPTII,S$GLB, | Performed by: THORACIC SURGERY (CARDIOTHORACIC VASCULAR SURGERY)

## 2023-08-28 PROCEDURE — 1160F PR REVIEW ALL MEDS BY PRESCRIBER/CLIN PHARMACIST DOCUMENTED: ICD-10-PCS | Mod: HCNC,CPTII,S$GLB, | Performed by: THORACIC SURGERY (CARDIOTHORACIC VASCULAR SURGERY)

## 2023-08-28 PROCEDURE — 3044F PR MOST RECENT HEMOGLOBIN A1C LEVEL <7.0%: ICD-10-PCS | Mod: HCNC,CPTII,S$GLB, | Performed by: THORACIC SURGERY (CARDIOTHORACIC VASCULAR SURGERY)

## 2023-08-28 PROCEDURE — 1160F RVW MEDS BY RX/DR IN RCRD: CPT | Mod: HCNC,CPTII,S$GLB, | Performed by: THORACIC SURGERY (CARDIOTHORACIC VASCULAR SURGERY)

## 2023-08-28 PROCEDURE — 3077F SYST BP >= 140 MM HG: CPT | Mod: HCNC,CPTII,S$GLB, | Performed by: THORACIC SURGERY (CARDIOTHORACIC VASCULAR SURGERY)

## 2023-08-28 PROCEDURE — 99205 OFFICE O/P NEW HI 60 MIN: CPT | Mod: HCNC,S$GLB,, | Performed by: THORACIC SURGERY (CARDIOTHORACIC VASCULAR SURGERY)

## 2023-08-28 PROCEDURE — 3080F PR MOST RECENT DIASTOLIC BLOOD PRESSURE >= 90 MM HG: ICD-10-PCS | Mod: HCNC,CPTII,S$GLB, | Performed by: THORACIC SURGERY (CARDIOTHORACIC VASCULAR SURGERY)

## 2023-08-28 PROCEDURE — 1126F AMNT PAIN NOTED NONE PRSNT: CPT | Mod: HCNC,CPTII,S$GLB, | Performed by: THORACIC SURGERY (CARDIOTHORACIC VASCULAR SURGERY)

## 2023-08-28 PROCEDURE — 3288F FALL RISK ASSESSMENT DOCD: CPT | Mod: HCNC,CPTII,S$GLB, | Performed by: THORACIC SURGERY (CARDIOTHORACIC VASCULAR SURGERY)

## 2023-08-28 PROCEDURE — 1126F PR PAIN SEVERITY QUANTIFIED, NO PAIN PRESENT: ICD-10-PCS | Mod: HCNC,CPTII,S$GLB, | Performed by: THORACIC SURGERY (CARDIOTHORACIC VASCULAR SURGERY)

## 2023-08-28 PROCEDURE — 99205 PR OFFICE/OUTPT VISIT, NEW, LEVL V, 60-74 MIN: ICD-10-PCS | Mod: HCNC,S$GLB,, | Performed by: THORACIC SURGERY (CARDIOTHORACIC VASCULAR SURGERY)

## 2023-08-28 NOTE — PROGRESS NOTES
Subjective:      Patient ID: Jarrett Charlton Jr. is a 73 y.o. male.    Chief Complaint: No chief complaint on file.      HPI:  Jarrett Charlton Jr. is a 73 y.o. male who presents to an initial surgical evaluation for thoracic ascending aortic aneurysm. Referred by Dr. Willie Edwards. Medical conditions include DM2 c/b renal complications, CKD (Cr 3.1), PAD,  HLD, HTN, ulcerative colitis, ileostomy in place, gout, BPH s/p TURP. Patient recently went to the ED on 8/24 for general weakness and dehydration for which  an incidental 4.7 cm TAA was found on CT. He endorse fatigue, lightheadedness, dizziness. Denies chest pain, SOB, palpitations, LE swelling.  Pt was initially started on weekly IVF  due to ostomy output and hypotension episodes. Patient monitors BP daily and is compliant with medications, notes that AM systolic blood pressure usually in the 130s and dips to the 80s all afternoon until the evening. Also says BP is always elevated every time he goes to the doctors. Today his BP in clinic is 178/108. Perform ADL independently. Denies assistive devices for walking. Has memory issues. He denies tobacco use and ETOH use. Denies family history of aneurysm.     Family and social history reviewed    Review of patient's allergies indicates:   Allergen Reactions    Crestor [rosuvastatin]     Lipitor [atorvastatin]      Past Medical History:   Diagnosis Date    Basal cell carcinoma     BPH (benign prostatic hyperplasia)     s/p TURP    Carotid stenosis     Chronic kidney disease     Claudication     Coronary artery disease     DDD (degenerative disc disease) 10/21/2013    Diabetes mellitus with renal complications     Disc disease, degenerative, cervical     Encounter for blood transfusion     GERD (gastroesophageal reflux disease)     Gout, chronic     History of ulcerative colitis     s/p colectomy and ileostomy    HLD (hyperlipidemia)     HTN (hypertension)     Ileostomy in place 1982    RBBB     Squamous cell  carcinoma 03/08/2018    Left superior helix near insertion    Squamous cell carcinoma 04/12/2018    Left forearm x 5    Ventricular tachycardia      Past Surgical History:   Procedure Laterality Date    cardiac stents      CATARACT EXTRACTION Bilateral     CERVICAL FUSION      CHOLECYSTECTOMY N/A 2/14/2023    Procedure: CHOLECYSTECTOMY;  Surgeon: Mike Joyce MD;  Location: [facility];  Service: General;  Laterality: N/A;  very high probabilty of converison to open    colectomy and ileostomy  1985    ENDOSCOPIC ULTRASOUND OF UPPER GASTROINTESTINAL TRACT N/A 2/10/2023    Procedure: ULTRASOUND, UPPER GI TRACT, ENDOSCOPIC;  Surgeon: Poli Fuller MD;  Location: [facility] ENDO;  Service: Endoscopy;  Laterality: N/A;    ERCP N/A 2/10/2023    Procedure: ERCP (ENDOSCOPIC RETROGRADE CHOLANGIOPANCREATOGRAPHY);  Surgeon: Poli Fuller MD;  Location: [facility] ENDO;  Service: Endoscopy;  Laterality: N/A;    EXCISION OF PAROTID GLAND Left 12/18/2020    Procedure: EXCISION, PAROTID GLAND;  Surgeon: Michael Pinzon MD;  [address];  Service: ENT;  Laterality: Left;    INSERTION OF IMPLANTABLE LOOP RECORDER  06/07/2021    INSERTION OF IMPLANTABLE LOOP RECORDER Left 6/7/2021    Procedure: INSERTION, IMPLANTABLE LOOP RECORDER;  Surgeon: Romario Dao MD;  Location: [facility] CATH LAB;  Service: Cardiology;  Laterality: Left;    LEFT HEART CATHETERIZATION Right 4/15/2021    Procedure: CATHETERIZATION, HEART, LEFT;  Surgeon: Marcio Jones MD;  Location: [facility] CATH LAB;  Service: Cardiology;  Laterality: Right;    LYSIS OF ADHESIONS N/A 11/9/2020    Procedure: LYSIS, ADHESIONS,  ERAS low;  Surgeon: CED Haley MD;  Location: [facility] OR 2ND FLR;  Service: Colon and Rectal;  Laterality: N/A;    LYSIS OF ADHESIONS N/A 2/14/2023    Procedure: LYSIS, ADHESIONS;  Surgeon: Mike Joyce MD;  Location: [facility];  Service: General;  Laterality: N/A;    POUCHOSCOPY N/A 4/6/2022    Procedure: ENDOSCOPY, POUCH, SMALL INTESTINE,  DIAGNOSTIC;  Surgeon: Dieter Juarez MD;  Location: [facility] ENDO (2ND FLR);  Service: Endoscopy;  Laterality: N/A;    REPAIR, HERNIA, PARASTOMAL N/A 11/9/2020    Procedure: REPAIR, HERNIA, PARASTOMAL;  Surgeon: CED Haley MD;  Location: [facility] OR 2ND FLR;  Service: Colon and Rectal;  Laterality: N/A;    TRANSURETHRAL RESECTION OF PROSTATE (TURP) WITHOUT USE OF LASER N/A 1/23/2019    Procedure: TURP, WITHOUT USING LASER BIPOLAR;  Surgeon: Catarino Mota MD;  Location: [facility] OR 1ST FLR;  Service: Urology;  Laterality: N/A;  1.5 HOURS     Family History       Problem Relation (Age of Onset)    Cancer Father    Dementia Mother    Diabetes Father          Social History     Socioeconomic History    Marital status:     Number of children: 1   Occupational History    Occupation:  x 44 years     Comment: Retired    Occupation: [place]    Tobacco Use    Smoking status: Never    Smokeless tobacco: Never   Substance and Sexual Activity    Alcohol use: No    Drug use: No    Sexual activity: Not Currently     Partners: Female     Social Determinants of Health     Financial Resource Strain: Unknown (5/28/2023)    Overall Financial Resource Strain (CARDIA)     Difficulty of Paying Living Expenses: Patient refused   Food Insecurity: Unknown (5/28/2023)    Hunger Vital Sign     Worried About Running Out of Food in the Last Year: Patient refused     Ran Out of Food in the Last Year: Patient refused   Transportation Needs: No Transportation Needs (5/28/2023)    PRAPARE - Transportation     Lack of Transportation (Medical): No     Lack of Transportation (Non-Medical): No   Physical Activity: Sufficiently Active (5/28/2023)    Exercise Vital Sign     Days of Exercise per Week: 7 days     Minutes of Exercise per Session: 60 min   Stress: Unknown (5/28/2023)    [employer] [place] [employer] - Occupational Stress Questionnaire     Feeling of Stress : Patient refused   Social  Connections: Unknown (5/28/2023)    Social Connection and Isolation Panel [NHANES]     Frequency of Communication with Friends and Family: Patient refused     Frequency of Social Gatherings with Friends and Family: Patient refused     Attends Amish Services: Patient refused     Active Member of Clubs or Organizations: Patient refused     Attends Club or Organization Meetings: Patient refused     Marital Status:    Housing Stability: Unknown (5/28/2023)    Housing Stability Vital Sign     Unable to Pay for Housing in the Last Year: Patient refused     Number of Places Lived in the Last Year: 1     Unstable Housing in the Last Year: Patient refused       Current medications Reviewed  Current Outpatient Medications on File Prior to Visit   Medication Sig Dispense Refill    ACCU-CHEK PAUL CONTROL SOLN Soln 1 drop by NOT APPLICABLE route once daily.      ACCU-CHEK SOFTCLIX LANCETS [facility] TEST BLOOD SUGAR THREE TIMES DAILY 300 each 3    allopurinoL (ZYLOPRIM) 300 MG tablet Take 1.5 tablets (450 mg total) by mouth once daily. 135 tablet 3    aspirin (ECOTRIN) 81 MG EC tablet Take 1 tablet (81 mg total) by mouth once daily. 30 tablet 1    blood sugar diagnostic (ONETOUCH ULTRA TEST) [facility] USE ONE STRIP TO CHECK GLUCOSE EVERY  each 11    blood-glucose meter (ACCU-CHEK GUIDE GLUCOSE METER) [facility] Accucheck BID 1 each 0    calcium-vitamin D3 (OS-DAKOTA 500 + D3) 500 mg-5 mcg (200 unit) per tablet Take 1 tablet by mouth once daily. 30 tablet 1    cholestyramine-aspartame (QUESTRAN LIGHT) 4 gram PwPk Take 1 packet (4 g total) by mouth 3 (three) times daily with meals. 270 packet 1    DROPSAFE ALCOHOL PREP PADS PadM USE TOPICALLY 5 (FIVE) TIMES DAILY. 140 each 1    fludrocortisone (FLORINEF) 0.1 mg Tab Take 1 tablet (100 mcg total) by mouth once daily. 180 tablet 0    gabapentin (NEURONTIN) 100 MG capsule Take 2 capsules (200 mg total) by mouth 3 (three) times daily. 180 capsule 1    glimepiride (AMARYL) 4 MG tablet  TAKE 1 TABLET (4 MG TOTAL) BY MOUTH 2 (TWO) TIMES DAILY BEFORE MEALS. 180 tablet 1    hydrALAZINE (APRESOLINE) 25 MG tablet Take 1 tablet (25 mg total) by mouth 3 (three) times daily as needed (for high BP systolic >180 or diastolic > 100). 90 tablet 1    LIDOcaine (LIDODERM) 5 % Place 1 patch onto the skin as needed. Remove & Discard patch within 12 hours or as directed by MD 30 patch 0    magnesium oxide 400 mg magnesium Tab 1 po qdaily 90 tablet 3    metoprolol succinate (TOPROL-XL) 25 MG 24 hr tablet Take 1 tablet (25 mg total) by mouth once daily. 90 tablet 1    midodrine (PROAMATINE) 10 MG tablet Take 1 tablet (10 mg total) by mouth daily as needed (Hypotension). 30 tablet 0    pantoprazole (PROTONIX) 40 MG tablet Take 1 tablet (40 mg total) by mouth once daily. 90 tablet 0    pravastatin (PRAVACHOL) 40 MG tablet TAKE 1 TABLET ONE TIME DAILY 90 tablet 1     Current Facility-Administered Medications on File Prior to Visit   Medication Dose Route Frequency Provider Last Rate Last Admin    sodium chloride 0.9% in 1,000 mL infusion   Intravenous Continuous Order, Paper           Review of Systems   Constitutional:  Positive for fatigue. Negative for activity change, appetite change and fever.   HENT:  Negative for nosebleeds.    Respiratory:  Negative for cough and shortness of breath.    Cardiovascular:  Negative for chest pain, palpitations and leg swelling.   Gastrointestinal:  Negative for abdominal distention, abdominal pain and nausea.   Genitourinary:  Negative for frequency.   Musculoskeletal:  Negative for arthralgias and myalgias.   Skin:  Negative for rash.   Neurological:  Positive for dizziness and light-headedness. Negative for numbness.   Hematological:  Does not bruise/bleed easily.     Objective:   Physical Exam  Constitutional:       Appearance: Normal appearance.   HENT:      Head: Normocephalic and atraumatic.      Nose: Nose normal.      Mouth/Throat:      Mouth: Mucous membranes are moist.    Eyes:      Extraocular Movements: Extraocular movements intact.   Cardiovascular:      Rate and Rhythm: Normal rate.   Pulmonary:      Effort: Pulmonary effort is normal.   Abdominal:      General: Abdomen is flat.      Palpations: Abdomen is soft.   Musculoskeletal:         General: Normal range of motion.      Cervical back: Normal range of motion.   Skin:     General: Skin is warm and dry.      Capillary Refill: Capillary refill takes less than 2 seconds.      Coloration: Skin is not pale.   Neurological:      General: No focal deficit present.      Mental Status: He is alert and oriented to person, place, and time.         Diagnostic Results: reviewed   CT chest 8/24/23  Aorta: Fusiform aneurysmal dilatation of the ascending thoracic aorta to 4.7 cm transverse dimension.  Calcifications of the aortic valve.  Moderate atheromatous sclerosis of the aorta.  Left-sided arch.  Anatomic variant short segment common origin of the brachiocephalic and left common carotid arteries    ECHO 12/2022  The left ventricle is normal in size with mild concentric hypertrophy and normal systolic function.  The estimated ejection fraction is 60%. Technically difficult study. EF is based on limited views with poor endocardial border visualization.  Indeterminate left ventricular diastolic function.  Normal right ventricular size with normal right ventricular systolic function.  PASP is 32 plus central venous pressure which could not be determined.  The ascending aorta is mildly dilated.  LVIDD 5.1  Root 3.6.  Assessment:   TAA   Plan:   Patient doing well and does not complain of any symptoms.  Patient was informed about his condition and importance of blood pressure control.  All questions and concerns were addressed.  At this stage patient did not need any surgery however we will follow-up in 1 year with repeat noncontrast CT scan of the chest abdomen and pelvis.

## 2023-08-28 NOTE — TELEPHONE ENCOUNTER
Patient needed to schedule with cardiothoracic.  Appointment 08/25/2023.  Dr. Marrufo spoke with patient.  Patient will update office next week.

## 2023-08-28 NOTE — TELEPHONE ENCOUNTER
----- Message from Shena Avila RN sent at 8/25/2023  7:47 AM CDT -----  Regarding: FW: Appointment  Contact: [phone]  Can you see if this patient needs to be seen in Cardiology? He was seen on 8/1  ----- Message -----  From: Anette Charlton RN  Sent: 8/24/2023   5:33 PM CDT  To: Aj Marrufo Staff  Subject: FW: Appointment                                    ----- Message -----  From: Wang Muñiz  Sent: 8/24/2023   4:12 PM CDT  To: [facility] Cardiology Clinical Support Staff  Subject: Appointment                                      Pt calling to get an appt. Please call pt to schedule from referral.

## 2023-08-29 ENCOUNTER — INFUSION (OUTPATIENT)
Dept: INFECTIOUS DISEASES | Facility: HOSPITAL | Age: 73
End: 2023-08-29
Attending: INTERNAL MEDICINE
Payer: MEDICARE

## 2023-08-29 VITALS
HEART RATE: 76 BPM | WEIGHT: 195.56 LBS | HEIGHT: 74 IN | SYSTOLIC BLOOD PRESSURE: 181 MMHG | RESPIRATION RATE: 20 BRPM | DIASTOLIC BLOOD PRESSURE: 90 MMHG | TEMPERATURE: 98 F | BODY MASS INDEX: 25.1 KG/M2 | OXYGEN SATURATION: 97 %

## 2023-08-29 DIAGNOSIS — N18.32 TYPE 2 DIABETES MELLITUS WITH STAGE 3B CHRONIC KIDNEY DISEASE, WITHOUT LONG-TERM CURRENT USE OF INSULIN: ICD-10-CM

## 2023-08-29 DIAGNOSIS — E11.22 TYPE 2 DIABETES MELLITUS WITH STAGE 3B CHRONIC KIDNEY DISEASE, WITHOUT LONG-TERM CURRENT USE OF INSULIN: ICD-10-CM

## 2023-08-29 DIAGNOSIS — E86.0 DEHYDRATION: Primary | ICD-10-CM

## 2023-08-29 DIAGNOSIS — Z93.3 COLOSTOMY PRESENT: ICD-10-CM

## 2023-08-29 DIAGNOSIS — I10 HYPERTENSION, UNSPECIFIED TYPE: ICD-10-CM

## 2023-08-29 PROCEDURE — 96360 HYDRATION IV INFUSION INIT: CPT | Mod: HCNC

## 2023-08-29 PROCEDURE — 25000003 PHARM REV CODE 250: Mod: HCNC | Performed by: NURSE PRACTITIONER

## 2023-08-29 RX ORDER — HEPARIN 100 UNIT/ML
500 SYRINGE INTRAVENOUS
Status: CANCELLED | OUTPATIENT
Start: 2023-08-31

## 2023-08-29 RX ORDER — SODIUM CHLORIDE 0.9 % (FLUSH) 0.9 %
10 SYRINGE (ML) INJECTION
Status: CANCELLED | OUTPATIENT
Start: 2023-08-31

## 2023-08-29 RX ADMIN — SODIUM CHLORIDE 1000 ML: 9 INJECTION, SOLUTION INTRAVENOUS at 08:08

## 2023-08-29 NOTE — PLAN OF CARE
Patient counseled on fall risk assessment and prevention at home and in public - verbalized understanding

## 2023-08-29 NOTE — PROGRESS NOTES
Pt received 1L NS bolus. Tolerated well.     Limited head-to-toe assessment due to privacy issues and visit reason though the opportunity was given for patient to express any concerns

## 2023-08-29 NOTE — TELEPHONE ENCOUNTER
No care due was identified.  [facility] Embedded Care Due Messages. Reference number: [id number].   8/29/2023 2:13:51 PM CDT

## 2023-08-30 RX ORDER — HYDRALAZINE HYDROCHLORIDE 25 MG/1
TABLET, FILM COATED ORAL
Qty: 90 TABLET | Refills: 1 | Status: SHIPPED | OUTPATIENT
Start: 2023-08-30 | End: 2024-03-05

## 2023-09-01 ENCOUNTER — INFUSION (OUTPATIENT)
Dept: INFECTIOUS DISEASES | Facility: HOSPITAL | Age: 73
End: 2023-09-01
Attending: INTERNAL MEDICINE
Payer: MEDICARE

## 2023-09-01 VITALS
BODY MASS INDEX: 27.25 KG/M2 | RESPIRATION RATE: 20 BRPM | HEART RATE: 70 BPM | WEIGHT: 212.31 LBS | HEIGHT: 74 IN | OXYGEN SATURATION: 99 % | SYSTOLIC BLOOD PRESSURE: 180 MMHG | DIASTOLIC BLOOD PRESSURE: 84 MMHG | TEMPERATURE: 98 F

## 2023-09-01 DIAGNOSIS — N18.32 TYPE 2 DIABETES MELLITUS WITH STAGE 3B CHRONIC KIDNEY DISEASE, WITHOUT LONG-TERM CURRENT USE OF INSULIN: ICD-10-CM

## 2023-09-01 DIAGNOSIS — E86.0 DEHYDRATION: Primary | ICD-10-CM

## 2023-09-01 DIAGNOSIS — Z93.3 COLOSTOMY PRESENT: ICD-10-CM

## 2023-09-01 DIAGNOSIS — E11.22 TYPE 2 DIABETES MELLITUS WITH STAGE 3B CHRONIC KIDNEY DISEASE, WITHOUT LONG-TERM CURRENT USE OF INSULIN: ICD-10-CM

## 2023-09-01 PROCEDURE — 96360 HYDRATION IV INFUSION INIT: CPT | Mod: HCNC

## 2023-09-01 PROCEDURE — 25000003 PHARM REV CODE 250: Mod: HCNC | Performed by: NURSE PRACTITIONER

## 2023-09-01 RX ORDER — HEPARIN 100 UNIT/ML
500 SYRINGE INTRAVENOUS
Status: CANCELLED | OUTPATIENT
Start: 2023-09-05

## 2023-09-01 RX ORDER — SODIUM CHLORIDE 0.9 % (FLUSH) 0.9 %
10 SYRINGE (ML) INJECTION
Status: CANCELLED | OUTPATIENT
Start: 2023-09-05

## 2023-09-01 RX ORDER — SODIUM CHLORIDE 0.9 % (FLUSH) 0.9 %
10 SYRINGE (ML) INJECTION
Status: DISCONTINUED | OUTPATIENT
Start: 2023-09-01 | End: 2023-09-01 | Stop reason: HOSPADM

## 2023-09-01 RX ADMIN — SODIUM CHLORIDE 1000 ML: 0.9 INJECTION, SOLUTION INTRAVENOUS at 08:09

## 2023-09-01 NOTE — PROGRESS NOTES
Patient received 0.9% NS 1000 ml bolus, and tolerated well. Denies of pain and discomfort. Left in no distress noted.

## 2023-09-05 ENCOUNTER — OFFICE VISIT (OUTPATIENT)
Dept: PRIMARY CARE CLINIC | Facility: CLINIC | Age: 73
End: 2023-09-05
Payer: MEDICARE

## 2023-09-05 ENCOUNTER — HOSPITAL ENCOUNTER (OUTPATIENT)
Dept: RADIOLOGY | Facility: HOSPITAL | Age: 73
Discharge: HOME | End: 2023-09-05
Attending: ORTHOPAEDIC SURGERY
Payer: MEDICARE

## 2023-09-05 ENCOUNTER — OFFICE VISIT (OUTPATIENT)
Dept: ORTHOPEDICS | Facility: CLINIC | Age: 73
End: 2023-09-05
Payer: MEDICARE

## 2023-09-05 ENCOUNTER — INFUSION (OUTPATIENT)
Dept: INFECTIOUS DISEASES | Facility: HOSPITAL | Age: 73
End: 2023-09-05
Payer: MEDICARE

## 2023-09-05 VITALS
WEIGHT: 215.19 LBS | SYSTOLIC BLOOD PRESSURE: 197 MMHG | BODY MASS INDEX: 27.62 KG/M2 | DIASTOLIC BLOOD PRESSURE: 91 MMHG | TEMPERATURE: 99 F | HEIGHT: 74 IN | RESPIRATION RATE: 18 BRPM | HEART RATE: 71 BPM | OXYGEN SATURATION: 97 %

## 2023-09-05 VITALS
WEIGHT: 217.06 LBS | DIASTOLIC BLOOD PRESSURE: 88 MMHG | SYSTOLIC BLOOD PRESSURE: 130 MMHG | HEIGHT: 74 IN | RESPIRATION RATE: 18 BRPM | HEART RATE: 88 BPM | OXYGEN SATURATION: 99 % | BODY MASS INDEX: 27.86 KG/M2

## 2023-09-05 VITALS — BODY MASS INDEX: 27.62 KG/M2 | HEIGHT: 74 IN | WEIGHT: 215.19 LBS

## 2023-09-05 DIAGNOSIS — M79.2 NEUROGENIC PAIN OF LEFT FOOT: Primary | ICD-10-CM

## 2023-09-05 DIAGNOSIS — M10.9 GOUTY ARTHRITIS OF RIGHT KNEE: Primary | ICD-10-CM

## 2023-09-05 DIAGNOSIS — M79.672 LEFT FOOT PAIN: ICD-10-CM

## 2023-09-05 DIAGNOSIS — N18.32 TYPE 2 DIABETES MELLITUS WITH STAGE 3B CHRONIC KIDNEY DISEASE, WITHOUT LONG-TERM CURRENT USE OF INSULIN: ICD-10-CM

## 2023-09-05 DIAGNOSIS — E11.22 TYPE 2 DIABETES MELLITUS WITH STAGE 3B CHRONIC KIDNEY DISEASE, WITHOUT LONG-TERM CURRENT USE OF INSULIN: ICD-10-CM

## 2023-09-05 DIAGNOSIS — M19.072 ARTHRITIS OF LEFT FOOT: ICD-10-CM

## 2023-09-05 DIAGNOSIS — E86.0 DEHYDRATION: Primary | ICD-10-CM

## 2023-09-05 DIAGNOSIS — R09.89 LABILE HYPERTENSION: ICD-10-CM

## 2023-09-05 DIAGNOSIS — G47.62 NOCTURNAL LEG CRAMPS: ICD-10-CM

## 2023-09-05 DIAGNOSIS — E78.5 HYPERLIPIDEMIA, UNSPECIFIED HYPERLIPIDEMIA TYPE: ICD-10-CM

## 2023-09-05 DIAGNOSIS — Z93.3 COLOSTOMY PRESENT: ICD-10-CM

## 2023-09-05 PROCEDURE — 1159F MED LIST DOCD IN RCRD: CPT | Mod: HCNC,CPTII,S$GLB, | Performed by: INTERNAL MEDICINE

## 2023-09-05 PROCEDURE — 73630 X-RAY EXAM OF FOOT: CPT | Mod: 26,HCNC,LT, | Performed by: RADIOLOGY

## 2023-09-05 PROCEDURE — 96360 HYDRATION IV INFUSION INIT: CPT | Mod: HCNC

## 2023-09-05 PROCEDURE — 3066F NEPHROPATHY DOC TX: CPT | Mod: HCNC,CPTII,S$GLB, | Performed by: ORTHOPAEDIC SURGERY

## 2023-09-05 PROCEDURE — 3008F BODY MASS INDEX DOCD: CPT | Mod: HCNC,CPTII,S$GLB, | Performed by: INTERNAL MEDICINE

## 2023-09-05 PROCEDURE — 99999 PR PBB SHADOW E&M-EST. PATIENT-LVL IV: CPT | Mod: PBBFAC,HCNC,, | Performed by: INTERNAL MEDICINE

## 2023-09-05 PROCEDURE — 3288F PR FALLS RISK ASSESSMENT DOCUMENTED: ICD-10-PCS | Mod: HCNC,CPTII,S$GLB, | Performed by: ORTHOPAEDIC SURGERY

## 2023-09-05 PROCEDURE — 1101F PR PT FALLS ASSESS DOC 0-1 FALLS W/OUT INJ PAST YR: ICD-10-PCS | Mod: HCNC,CPTII,S$GLB, | Performed by: ORTHOPAEDIC SURGERY

## 2023-09-05 PROCEDURE — 1125F AMNT PAIN NOTED PAIN PRSNT: CPT | Mod: HCNC,CPTII,S$GLB, | Performed by: ORTHOPAEDIC SURGERY

## 2023-09-05 PROCEDURE — 1159F PR MEDICATION LIST DOCUMENTED IN MEDICAL RECORD: ICD-10-PCS | Mod: HCNC,CPTII,S$GLB, | Performed by: INTERNAL MEDICINE

## 2023-09-05 PROCEDURE — 1159F MED LIST DOCD IN RCRD: CPT | Mod: HCNC,CPTII,S$GLB, | Performed by: ORTHOPAEDIC SURGERY

## 2023-09-05 PROCEDURE — 99214 OFFICE O/P EST MOD 30 MIN: CPT | Mod: HCNC,S$GLB,, | Performed by: INTERNAL MEDICINE

## 2023-09-05 PROCEDURE — 3075F PR MOST RECENT SYSTOLIC BLOOD PRESS GE 130-139MM HG: ICD-10-PCS | Mod: HCNC,CPTII,S$GLB, | Performed by: INTERNAL MEDICINE

## 2023-09-05 PROCEDURE — 1159F PR MEDICATION LIST DOCUMENTED IN MEDICAL RECORD: ICD-10-PCS | Mod: HCNC,CPTII,S$GLB, | Performed by: ORTHOPAEDIC SURGERY

## 2023-09-05 PROCEDURE — 99999 PR PBB SHADOW E&M-EST. PATIENT-LVL III: ICD-10-PCS | Mod: PBBFAC,HCNC,, | Performed by: ORTHOPAEDIC SURGERY

## 2023-09-05 PROCEDURE — 25000003 PHARM REV CODE 250: Mod: HCNC | Performed by: NURSE PRACTITIONER

## 2023-09-05 PROCEDURE — 3075F SYST BP GE 130 - 139MM HG: CPT | Mod: HCNC,CPTII,S$GLB, | Performed by: INTERNAL MEDICINE

## 2023-09-05 PROCEDURE — 99213 OFFICE O/P EST LOW 20 MIN: CPT | Mod: HCNC,S$GLB,, | Performed by: ORTHOPAEDIC SURGERY

## 2023-09-05 PROCEDURE — 3079F DIAST BP 80-89 MM HG: CPT | Mod: HCNC,CPTII,S$GLB, | Performed by: INTERNAL MEDICINE

## 2023-09-05 PROCEDURE — 1126F AMNT PAIN NOTED NONE PRSNT: CPT | Mod: HCNC,CPTII,S$GLB, | Performed by: INTERNAL MEDICINE

## 2023-09-05 PROCEDURE — 99213 PR OFFICE/OUTPT VISIT, EST, LEVL III, 20-29 MIN: ICD-10-PCS | Mod: HCNC,S$GLB,, | Performed by: ORTHOPAEDIC SURGERY

## 2023-09-05 PROCEDURE — 1125F PR PAIN SEVERITY QUANTIFIED, PAIN PRESENT: ICD-10-PCS | Mod: HCNC,CPTII,S$GLB, | Performed by: ORTHOPAEDIC SURGERY

## 2023-09-05 PROCEDURE — 3066F NEPHROPATHY DOC TX: CPT | Mod: HCNC,CPTII,S$GLB, | Performed by: INTERNAL MEDICINE

## 2023-09-05 PROCEDURE — 1160F PR REVIEW ALL MEDS BY PRESCRIBER/CLIN PHARMACIST DOCUMENTED: ICD-10-PCS | Mod: HCNC,CPTII,S$GLB, | Performed by: INTERNAL MEDICINE

## 2023-09-05 PROCEDURE — 1126F PR PAIN SEVERITY QUANTIFIED, NO PAIN PRESENT: ICD-10-PCS | Mod: HCNC,CPTII,S$GLB, | Performed by: INTERNAL MEDICINE

## 2023-09-05 PROCEDURE — 3044F HG A1C LEVEL LT 7.0%: CPT | Mod: HCNC,CPTII,S$GLB, | Performed by: ORTHOPAEDIC SURGERY

## 2023-09-05 PROCEDURE — 1160F RVW MEDS BY RX/DR IN RCRD: CPT | Mod: HCNC,CPTII,S$GLB, | Performed by: ORTHOPAEDIC SURGERY

## 2023-09-05 PROCEDURE — 3008F BODY MASS INDEX DOCD: CPT | Mod: HCNC,CPTII,S$GLB, | Performed by: ORTHOPAEDIC SURGERY

## 2023-09-05 PROCEDURE — 73630 X-RAY EXAM OF FOOT: CPT | Mod: TC,HCNC,LT

## 2023-09-05 PROCEDURE — 3044F HG A1C LEVEL LT 7.0%: CPT | Mod: HCNC,CPTII,S$GLB, | Performed by: INTERNAL MEDICINE

## 2023-09-05 PROCEDURE — 99999 PR PBB SHADOW E&M-EST. PATIENT-LVL IV: ICD-10-PCS | Mod: PBBFAC,HCNC,, | Performed by: INTERNAL MEDICINE

## 2023-09-05 PROCEDURE — 3288F FALL RISK ASSESSMENT DOCD: CPT | Mod: HCNC,CPTII,S$GLB, | Performed by: ORTHOPAEDIC SURGERY

## 2023-09-05 PROCEDURE — 3044F PR MOST RECENT HEMOGLOBIN A1C LEVEL <7.0%: ICD-10-PCS | Mod: HCNC,CPTII,S$GLB, | Performed by: INTERNAL MEDICINE

## 2023-09-05 PROCEDURE — 73630 XR FOOT COMPLETE 3 VIEW LEFT: ICD-10-PCS | Mod: 26,HCNC,LT, | Performed by: RADIOLOGY

## 2023-09-05 PROCEDURE — 3066F PR DOCUMENTATION OF TREATMENT FOR NEPHROPATHY: ICD-10-PCS | Mod: HCNC,CPTII,S$GLB, | Performed by: INTERNAL MEDICINE

## 2023-09-05 PROCEDURE — 99214 PR OFFICE/OUTPT VISIT, EST, LEVL IV, 30-39 MIN: ICD-10-PCS | Mod: HCNC,S$GLB,, | Performed by: INTERNAL MEDICINE

## 2023-09-05 PROCEDURE — 3008F PR BODY MASS INDEX (BMI) DOCUMENTED: ICD-10-PCS | Mod: HCNC,CPTII,S$GLB, | Performed by: ORTHOPAEDIC SURGERY

## 2023-09-05 PROCEDURE — 1160F PR REVIEW ALL MEDS BY PRESCRIBER/CLIN PHARMACIST DOCUMENTED: ICD-10-PCS | Mod: HCNC,CPTII,S$GLB, | Performed by: ORTHOPAEDIC SURGERY

## 2023-09-05 PROCEDURE — 3066F PR DOCUMENTATION OF TREATMENT FOR NEPHROPATHY: ICD-10-PCS | Mod: HCNC,CPTII,S$GLB, | Performed by: ORTHOPAEDIC SURGERY

## 2023-09-05 PROCEDURE — 99999 PR PBB SHADOW E&M-EST. PATIENT-LVL III: CPT | Mod: PBBFAC,HCNC,, | Performed by: ORTHOPAEDIC SURGERY

## 2023-09-05 PROCEDURE — 3008F PR BODY MASS INDEX (BMI) DOCUMENTED: ICD-10-PCS | Mod: HCNC,CPTII,S$GLB, | Performed by: INTERNAL MEDICINE

## 2023-09-05 PROCEDURE — 1160F RVW MEDS BY RX/DR IN RCRD: CPT | Mod: HCNC,CPTII,S$GLB, | Performed by: INTERNAL MEDICINE

## 2023-09-05 PROCEDURE — 3044F PR MOST RECENT HEMOGLOBIN A1C LEVEL <7.0%: ICD-10-PCS | Mod: HCNC,CPTII,S$GLB, | Performed by: ORTHOPAEDIC SURGERY

## 2023-09-05 PROCEDURE — 3079F PR MOST RECENT DIASTOLIC BLOOD PRESSURE 80-89 MM HG: ICD-10-PCS | Mod: HCNC,CPTII,S$GLB, | Performed by: INTERNAL MEDICINE

## 2023-09-05 PROCEDURE — 1101F PT FALLS ASSESS-DOCD LE1/YR: CPT | Mod: HCNC,CPTII,S$GLB, | Performed by: ORTHOPAEDIC SURGERY

## 2023-09-05 RX ORDER — PREDNISONE 20 MG/1
20 TABLET ORAL 2 TIMES DAILY
Qty: 8 TABLET | Refills: 0 | Status: ON HOLD | OUTPATIENT
Start: 2023-09-05 | End: 2023-11-27 | Stop reason: SDUPTHER

## 2023-09-05 RX ORDER — HEPARIN 100 UNIT/ML
500 SYRINGE INTRAVENOUS
Status: CANCELLED | OUTPATIENT
Start: 2023-09-07

## 2023-09-05 RX ORDER — SODIUM CHLORIDE 0.9 % (FLUSH) 0.9 %
10 SYRINGE (ML) INJECTION
Status: CANCELLED | OUTPATIENT
Start: 2023-09-07

## 2023-09-05 RX ORDER — LANOLIN ALCOHOL/MO/W.PET/CERES
400 CREAM (GRAM) TOPICAL DAILY
Qty: 30 TABLET | Refills: 2 | Status: SHIPPED | OUTPATIENT
Start: 2023-09-05

## 2023-09-05 RX ORDER — OXYCODONE HYDROCHLORIDE 5 MG/1
5 TABLET ORAL EVERY 8 HOURS PRN
Qty: 20 TABLET | Refills: 0 | Status: SHIPPED | OUTPATIENT
Start: 2023-09-05 | End: 2023-09-06 | Stop reason: SDUPTHER

## 2023-09-05 RX ADMIN — SODIUM CHLORIDE 1000 ML: 0.9 INJECTION, SOLUTION INTRAVENOUS at 08:09

## 2023-09-05 NOTE — PROGRESS NOTES
DATE: 9/5/2023  PATIENT: Jarrett Charlton Jr.    CHIEF COMPLAINT: left foot pain    HISTORY:  Jarrett Charlton Jr. is a 73 y.o. male with past medical history of CKD IV and diabetes (last A1c 6.3) here for initial evaluation of left foot pain and swelling. The pain has been present for 3-4 months. It began without any specific trauma. The patient describes the pain as sharp with occasional electric shooting pains that do not shoot up the leg or further down into the foot.  The pain is worse with movement and sometimes at night. There is nothing that significantly improves the pain. There is no associated numbness and tingling in the area or in the toes.  He has taken gabapentin for other reasons, but has not noticed that it improves the foot pain. Prior treatments have included orthotic shoe wear - no substantial inserts. He has not tried and bracing, injections, or surgery. He ambulates without assistive devices.    PAST MEDICAL/SURGICAL HISTORY:  Past Medical History:   Diagnosis Date    Basal cell carcinoma     BPH (benign prostatic hyperplasia)     s/p TURP    Carotid stenosis     Chronic kidney disease     Claudication     Coronary artery disease     DDD (degenerative disc disease) 10/21/2013    Diabetes mellitus with renal complications     Disc disease, degenerative, cervical     Encounter for blood transfusion     GERD (gastroesophageal reflux disease)     Gout, chronic     History of ulcerative colitis     s/p colectomy and ileostomy    HLD (hyperlipidemia)     HTN (hypertension)     Ileostomy in place 1982    RBBB     Squamous cell carcinoma 03/08/2018    Left superior helix near insertion    Squamous cell carcinoma 04/12/2018    Left forearm x 5    Ventricular tachycardia      Past Surgical History:   Procedure Laterality Date    cardiac stents      CATARACT EXTRACTION Bilateral     CERVICAL FUSION      CHOLECYSTECTOMY N/A 2/14/2023    Procedure: CHOLECYSTECTOMY;  Surgeon: Mike Joyce MD;  Location:  [facility] OR;  Service: General;  Laterality: N/A;  very high probabilty of converison to open    colectomy and ileostomy  1985    ENDOSCOPIC ULTRASOUND OF UPPER GASTROINTESTINAL TRACT N/A 2/10/2023    Procedure: ULTRASOUND, UPPER GI TRACT, ENDOSCOPIC;  Surgeon: Poli Fuller MD;  Location: [facility] ENDO;  Service: Endoscopy;  Laterality: N/A;    ERCP N/A 2/10/2023    Procedure: ERCP (ENDOSCOPIC RETROGRADE CHOLANGIOPANCREATOGRAPHY);  Surgeon: Poli Fuller MD;  Location: [facility] ENDO;  Service: Endoscopy;  Laterality: N/A;    EXCISION OF PAROTID GLAND Left 12/18/2020    Procedure: EXCISION, PAROTID GLAND;  Surgeon: Michael Pinzon MD;  Location: [facility] OR 2ND FLR;  Service: ENT;  Laterality: Left;    INSERTION OF IMPLANTABLE LOOP RECORDER  06/07/2021    INSERTION OF IMPLANTABLE LOOP RECORDER Left 6/7/2021    Procedure: INSERTION, IMPLANTABLE LOOP RECORDER;  Surgeon: Romario Dao MD;  Location: [facility] CATH LAB;  Service: Cardiology;  Laterality: Left;    LEFT HEART CATHETERIZATION Right 4/15/2021    Procedure: CATHETERIZATION, HEART, LEFT;  Surgeon: Marcio Jones MD;  Location: [facility] CATH LAB;  Service: Cardiology;  Laterality: Right;    LYSIS OF ADHESIONS N/A 11/9/2020    Procedure: LYSIS, ADHESIONS,  ERAS low;  Surgeon: CED Haley MD;  Location: [facility] OR 2ND FLR;  Service: Colon and Rectal;  Laterality: N/A;    LYSIS OF ADHESIONS N/A 2/14/2023    Procedure: LYSIS, ADHESIONS;  Surgeon: Mike Joyce MD;  Location: [facility] OR;  Service: General;  Laterality: N/A;    POUCHOSCOPY N/A 4/6/2022    Procedure: ENDOSCOPY, POUCH, SMALL INTESTINE, DIAGNOSTIC;  Surgeon: Dieter Juarez MD;  Location: [facility] ENDO (2ND FLR);  Service: Endoscopy;  Laterality: N/A;    REPAIR, HERNIA, PARASTOMAL N/A 11/9/2020    Procedure: REPAIR, HERNIA, PARASTOMAL;  Surgeon: CED Haley MD;  Location: [facility] OR 2ND FLR;  Service: Colon and Rectal;  Laterality: N/A;    TRANSURETHRAL RESECTION OF PROSTATE (TURP) WITHOUT USE OF LASER  N/A 1/23/2019    Procedure: TURP, WITHOUT USING LASER BIPOLAR;  Surgeon: Catarino Mota MD;  Location: [facility] OR [address] [place] [address];  Service: Urology;  Laterality: N/A;  1.5 HOURS       Current Medications:   Current Outpatient Medications:     ACCU-CHEK PAUL CONTROL SOLN Soln, 1 drop by NOT APPLICABLE route once daily., Disp: , Rfl:     ACCU-CHEK SOFTCLIX LANCETS [facility], TEST BLOOD SUGAR THREE TIMES DAILY, Disp: 300 each, Rfl: 3    allopurinoL (ZYLOPRIM) 300 MG tablet, Take 1.5 tablets (450 mg total) by mouth once daily., Disp: 135 tablet, Rfl: 3    blood sugar diagnostic (ONETOUCH ULTRA TEST) Strp, USE ONE STRIP TO CHECK GLUCOSE EVERY DAY, Disp: 100 each, Rfl: 11    blood-glucose meter (ACCU-CHEK GUIDE GLUCOSE METER) [facility], Accucheck BID, Disp: 1 each, Rfl: 0    cholestyramine-aspartame (QUESTRAN LIGHT) 4 gram PwPk, Take 1 packet (4 g total) by mouth 3 (three) times daily with meals., Disp: 270 packet, Rfl: 1    DROPSAFE ALCOHOL PREP PADS PadM, USE TOPICALLY 5 (FIVE) TIMES DAILY., Disp: 140 each, Rfl: 1    fludrocortisone (FLORINEF) 0.1 mg Tab, Take 1 tablet (100 mcg total) by mouth once daily., Disp: 180 tablet, Rfl: 0    glimepiride (AMARYL) 4 MG tablet, TAKE 1 TABLET (4 MG TOTAL) BY MOUTH 2 (TWO) TIMES DAILY BEFORE MEALS., Disp: 180 tablet, Rfl: 1    hydrALAZINE (APRESOLINE) 25 MG tablet, TAKE 1 TABLET THREE TIMES DAILY AS NEEDED FOR HIGH BLOOD PRESSURE: SYSTOLIC ABOVE 180 OR DIASTOLIC ABOVE 100., Disp: 90 tablet, Rfl: 1    LIDOcaine (LIDODERM) 5 %, Place 1 patch onto the skin as needed. Remove & Discard patch within 12 hours or as directed by MD, Disp: 30 patch, Rfl: 0    magnesium oxide 400 mg magnesium Tab, 1 po qdaily, Disp: 90 tablet, Rfl: 3    metoprolol succinate (TOPROL-XL) 25 MG 24 hr tablet, Take 1 tablet (25 mg total) by mouth once daily., Disp: 90 tablet, Rfl: 1    midodrine (PROAMATINE) 10 MG tablet, Take 1 tablet (10 mg total) by mouth daily as needed (Hypotension)., Disp: 30 tablet, Rfl: 0    pantoprazole  (PROTONIX) 40 MG tablet, Take 1 tablet (40 mg total) by mouth once daily., Disp: 90 tablet, Rfl: 0    pravastatin (PRAVACHOL) 40 MG tablet, TAKE 1 TABLET ONE TIME DAILY, Disp: 90 tablet, Rfl: 1    aspirin (ECOTRIN) 81 MG EC tablet, Take 1 tablet (81 mg total) by mouth once daily., Disp: 30 tablet, Rfl: 1    calcium-vitamin D3 (OS-DAKOTA 500 + D3) 500 mg-5 mcg (200 unit) per tablet, Take 1 tablet by mouth once daily., Disp: 30 tablet, Rfl: 1    gabapentin (NEURONTIN) 100 MG capsule, Take 2 capsules (200 mg total) by mouth 3 (three) times daily., Disp: 180 capsule, Rfl: 1  No current facility-administered medications for this visit.    Facility-Administered Medications Ordered in Other Visits:     sodium chloride 0.9% in 1,000 mL infusion, , Intravenous, Continuous, Order, Paper    Social History:   Social History     Socioeconomic History    Marital status:     Number of children: 1   Occupational History    Occupation:  x 44 years     Comment: Retired    Occupation: [employer]    Tobacco Use    Smoking status: Never    Smokeless tobacco: Never   Substance and Sexual Activity    Alcohol use: No    Drug use: No    Sexual activity: Not Currently     Partners: Female     Social Determinants of Health     Financial Resource Strain: Unknown (5/28/2023)    Overall Financial Resource Strain (CARDIA)     Difficulty of Paying Living Expenses: Patient refused   Food Insecurity: Unknown (5/28/2023)    Hunger Vital Sign     Worried About Running Out of Food in the Last Year: Patient refused     Ran Out of Food in the Last Year: Patient refused   Transportation Needs: No Transportation Needs (5/28/2023)    PRAPARE - Transportation     Lack of Transportation (Medical): No     Lack of Transportation (Non-Medical): No   Physical Activity: Sufficiently Active (5/28/2023)    Exercise Vital Sign     Days of Exercise per Week: 7 days     Minutes of Exercise per Session: 60 min   Stress: Unknown (5/28/2023)  "   [place] [employer] - Occupational Stress Questionnaire     Feeling of Stress : Patient refused   Social Connections: Unknown (5/28/2023)    Social Connection and Isolation Panel [NHANES]     Frequency of Communication with Friends and Family: Patient refused     Frequency of Social Gatherings with Friends and Family: Patient refused     Attends Caodaism Services: Patient refused     Active Member of Clubs or Organizations: Patient refused     Attends Club or Organization Meetings: Patient refused     Marital Status:    Housing Stability: Unknown (5/28/2023)    Housing Stability Vital Sign     Unable to Pay for Housing in the Last Year: Patient refused     Number of Places Lived in the Last Year: 1     Unstable Housing in the Last Year: Patient refused       REVIEW OF SYSTEMS:  Constitution: Negative. Negative for chills, fever and night sweats.   Cardiovascular: Negative for chest pain and syncope.   Respiratory: Negative for cough and shortness of breath.   Gastrointestinal: See HPI. Negative for nausea/vomiting. Negative for abdominal pain.  Skin: Positive for chronic darkening of the skin of the legs  Musculoskeletal: Negative for falls and muscle weakness.   Neurological: See HPI. No seizures.   Allergic/Immunologic: Negative for hives and persistent infections.    PHYSICAL EXAMINATION:    Ht 6' 2" (1.88 m)   Wt 97.6 kg (215 lb 2.7 oz)   BMI 27.63 kg/m²     General: The patient is a 73 y.o. male in no apparent distress, the patient is orientatied to person, place and time.   Psych: Normal mood and affect  HEENT:  NCAT, sclera nonicteric  Lungs:  Respirations are equal and unlabored.    Left Foot and Ankle Exam    INSPECTION:        Gait:    Normal    Scars:   None   Swelling:  Mild over the dorsal midfoot   Color:   Darkening of the distal legs consistent with chronic venous stasis   Atrophy:  None  Heel / Toe Walking: Able to perform heel raise    ALIGNMENT:    Hindfoot "  Valgus    Midfoot: Planus  Forefoot: Normal     TENDERNESS:  LATERAL:      Sinus tarsi:  None     Fibula:   None   Peroneal tubercle.  None     ANTERIOR:  Anteromedial joint line:  None  Anterolateral joint line:  None  Talonavicular:   Positive  Anterior tibialis:   None  Extensor tendons:   None    POSTERIOR:  Medial/lateral achilles:   None    MEDIAL:      Deltoid:  None    Malleolus:  None    PTT:   Positive   Navicular:  Positive           CALCANEUS:  Retrocalcaneal:   None  Medial achilles:   None  Lateral achilles:   None  Calcaneal tuberosity:   None    FOOT:    Calcaneal cuboid  Positive   MT / MT heads:  None   Navicular   Positive  Medial cord origin PF:  None  Cuneiforms:   Positive    Lisfranc    None    Tarsal tunnel:   None  Base of the fifth metatarsal  None   Tinels sign   Negative        RANGE OF MOTION:  LEFT      Ankle DF/PF:  15/45         Eversion/Inversion: 15/25     Midfoot ABD/ADD: 0/0 - rigid                   STRENGTH: (affected)  Anterior tibialis: 5/5  Posterior tibialis: 5/5  Gastroc-soleus: 5/5  Peroneals:  5/5  EHL:   5/5  FHL:   5/5    SPECIAL TESTS:   ANKLE INSTABILITY: (*pain)    Anterior drawer:   Normal    Inversion:   30°     Eversion  10°        PROVOCATIVE TESTINst-2nd MT toggle No pain at Lisfranc    MT-T torque  No pain at Lisfranc     NEUROLOGIC TESTING:  All dermatomes foot, ankle and leg have normal sensation light touch    VASCULAR:  2+ pulses PT/DT with brisk capillary refill toes.    IMAGING:     Radiographs of the left foot were ordered and personally reviewed with the patient today. They demonstrate significant degenerative changes throughout the NC joints and TMT joints. The ankle joint and TN joint are relatively spared. Alignment demonstrates significant arch collapse.    ASSESSMENT/PLAN:    Jarrett was seen today for pain.    Diagnoses and all orders for this visit:    Neurogenic pain of left foot  -     X-Ray Foot Complete Left; Future    Arthritis of  left foot      The patient has 3-4 months of left foot pain which he describes mostly as neurogenic pain.  He does have significant arthritis in his midfoot along with some associated swelling, but the majority of his complaints focus on the medial foot around the area previously described as neurogenic pain.  Surgical intervention would be targeted at fusing the arthritic joints, but I do not think this would address all of his pain.  I recommended he gets some over-the-counter insoles for more arch support (education provided).  I think he would benefit from a multimodal pain control approach which can be managed by his PCP or pain management given his medical comorbidities of CKD and diabetes.  No plans for surgical intervention at this time.  He will follow up with me as needed.    I have personally taken the history and examined this patient and agree with the residents note as stated above.  Patient has significant arthritis of multiple joints in his midfoot with associated flatfoot deformity which I think is a significant contributor to his pain, but he also describes neurogenic type sharp pains that occur at night for which I do not have a good explanation.  Unfortunately he can not take nonsteroidal anti-inflammatory medications.  He is currently taking gabapentin in a relatively low dose.  I would not recommend any surgical intervention for his arthritic problem.  I would recommend conservative management of his pain.  I suggested he get some better over-the-counter orthotics..

## 2023-09-06 ENCOUNTER — CLINICAL SUPPORT (OUTPATIENT)
Dept: CARDIOLOGY | Facility: HOSPITAL | Age: 73
End: 2023-09-06
Payer: MEDICARE

## 2023-09-06 ENCOUNTER — OFFICE VISIT (OUTPATIENT)
Dept: GASTROENTEROLOGY | Facility: CLINIC | Age: 73
End: 2023-09-06
Payer: MEDICARE

## 2023-09-06 VITALS — HEIGHT: 74 IN | WEIGHT: 214.94 LBS | BODY MASS INDEX: 27.59 KG/M2

## 2023-09-06 DIAGNOSIS — Z95.818 PRESENCE OF OTHER CARDIAC IMPLANTS AND GRAFTS: ICD-10-CM

## 2023-09-06 DIAGNOSIS — R19.7 DIARRHEA DUE TO MALABSORPTION: Primary | ICD-10-CM

## 2023-09-06 DIAGNOSIS — M10.9 GOUTY ARTHRITIS OF RIGHT KNEE: ICD-10-CM

## 2023-09-06 DIAGNOSIS — K90.9 DIARRHEA DUE TO MALABSORPTION: Primary | ICD-10-CM

## 2023-09-06 DIAGNOSIS — Z93.2 ILEOSTOMY IN PLACE: ICD-10-CM

## 2023-09-06 PROCEDURE — 3008F PR BODY MASS INDEX (BMI) DOCUMENTED: ICD-10-PCS | Mod: HCNC,CPTII,S$GLB, | Performed by: INTERNAL MEDICINE

## 2023-09-06 PROCEDURE — 3008F BODY MASS INDEX DOCD: CPT | Mod: HCNC,CPTII,S$GLB, | Performed by: INTERNAL MEDICINE

## 2023-09-06 PROCEDURE — 99214 OFFICE O/P EST MOD 30 MIN: CPT | Mod: HCNC,S$GLB,, | Performed by: INTERNAL MEDICINE

## 2023-09-06 PROCEDURE — 99999 PR PBB SHADOW E&M-EST. PATIENT-LVL III: CPT | Mod: PBBFAC,HCNC,, | Performed by: INTERNAL MEDICINE

## 2023-09-06 PROCEDURE — 1101F PR PT FALLS ASSESS DOC 0-1 FALLS W/OUT INJ PAST YR: ICD-10-PCS | Mod: HCNC,CPTII,S$GLB, | Performed by: INTERNAL MEDICINE

## 2023-09-06 PROCEDURE — 3066F NEPHROPATHY DOC TX: CPT | Mod: HCNC,CPTII,S$GLB, | Performed by: INTERNAL MEDICINE

## 2023-09-06 PROCEDURE — 1101F PT FALLS ASSESS-DOCD LE1/YR: CPT | Mod: HCNC,CPTII,S$GLB, | Performed by: INTERNAL MEDICINE

## 2023-09-06 PROCEDURE — 3044F PR MOST RECENT HEMOGLOBIN A1C LEVEL <7.0%: ICD-10-PCS | Mod: HCNC,CPTII,S$GLB, | Performed by: INTERNAL MEDICINE

## 2023-09-06 PROCEDURE — 93298 CARDIAC DEVICE CHECK - REMOTE: ICD-10-PCS | Mod: HCNC,,, | Performed by: INTERNAL MEDICINE

## 2023-09-06 PROCEDURE — 99214 PR OFFICE/OUTPT VISIT, EST, LEVL IV, 30-39 MIN: ICD-10-PCS | Mod: HCNC,S$GLB,, | Performed by: INTERNAL MEDICINE

## 2023-09-06 PROCEDURE — 1159F MED LIST DOCD IN RCRD: CPT | Mod: HCNC,CPTII,S$GLB, | Performed by: INTERNAL MEDICINE

## 2023-09-06 PROCEDURE — 93298 REM INTERROG DEV EVAL SCRMS: CPT | Mod: HCNC,,, | Performed by: INTERNAL MEDICINE

## 2023-09-06 PROCEDURE — 1125F PR PAIN SEVERITY QUANTIFIED, PAIN PRESENT: ICD-10-PCS | Mod: HCNC,CPTII,S$GLB, | Performed by: INTERNAL MEDICINE

## 2023-09-06 PROCEDURE — 1125F AMNT PAIN NOTED PAIN PRSNT: CPT | Mod: HCNC,CPTII,S$GLB, | Performed by: INTERNAL MEDICINE

## 2023-09-06 PROCEDURE — 99999 PR PBB SHADOW E&M-EST. PATIENT-LVL III: ICD-10-PCS | Mod: PBBFAC,HCNC,, | Performed by: INTERNAL MEDICINE

## 2023-09-06 PROCEDURE — 3066F PR DOCUMENTATION OF TREATMENT FOR NEPHROPATHY: ICD-10-PCS | Mod: HCNC,CPTII,S$GLB, | Performed by: INTERNAL MEDICINE

## 2023-09-06 PROCEDURE — G2066 INTER DEVC REMOTE 30D: HCPCS | Mod: HCNC | Performed by: INTERNAL MEDICINE

## 2023-09-06 PROCEDURE — 3288F PR FALLS RISK ASSESSMENT DOCUMENTED: ICD-10-PCS | Mod: HCNC,CPTII,S$GLB, | Performed by: INTERNAL MEDICINE

## 2023-09-06 PROCEDURE — 3044F HG A1C LEVEL LT 7.0%: CPT | Mod: HCNC,CPTII,S$GLB, | Performed by: INTERNAL MEDICINE

## 2023-09-06 PROCEDURE — 3288F FALL RISK ASSESSMENT DOCD: CPT | Mod: HCNC,CPTII,S$GLB, | Performed by: INTERNAL MEDICINE

## 2023-09-06 PROCEDURE — 1159F PR MEDICATION LIST DOCUMENTED IN MEDICAL RECORD: ICD-10-PCS | Mod: HCNC,CPTII,S$GLB, | Performed by: INTERNAL MEDICINE

## 2023-09-06 NOTE — TELEPHONE ENCOUNTER
Patient walked in to inform us that his medication Oxycodone 5 mg tablets was sent to the wrong pharmacy. It was sent to [facility] and he needed it sent to [facility] in [place]. I called to make sure the prescription was not filled and the prescription was canceled. Please advise on the patient medication.

## 2023-09-06 NOTE — TELEPHONE ENCOUNTER
No care due was identified.  Health [facility] Embedded Care Due Messages. Reference number: [id number].   9/06/2023 1:50:05 PM CDT

## 2023-09-06 NOTE — PROGRESS NOTES
"Subjective:       Patient ID: Jarrett Charlton Jr. is a 73 y.o. male.    Chief Complaint: Follow-up and Diarrhea    Patient here today to follow-up with aforementioned complaints.  He was previously seen by NICKY Gallagher in June with complaints of diarrhea.  He was apparently doing fairly well on Questran t.i.d. at the time with Imodium p.r.n..  Today patient reports ongoing diarrhea.  He reports taking polyethylene with meals and Questran to "thickened liquids.".  He does get some improvement with Imodium.    Uncertain whether or not pain medicine helped     History of total colectomy with end ileostomy.  Recent cholecystectomy  Review of Systems   Gastrointestinal:  Positive for diarrhea. Negative for abdominal distention and abdominal pain.       The following portions of the patient's history were reviewed and updated as appropriate: allergies, current medications, past family history, past medical history, past social history, past surgical history and problem list.    Objective:      Physical Exam  Constitutional:       Appearance: He is well-developed.   HENT:      Head: Normocephalic and atraumatic.   Eyes:      Conjunctiva/sclera: Conjunctivae normal.   Pulmonary:      Effort: Pulmonary effort is normal. No respiratory distress.   Abdominal:      Comments: Ileostomy   Neurological:      Mental Status: He is alert and oriented to person, place, and time.   Psychiatric:         Behavior: Behavior normal.         Thought Content: Thought content normal.         Judgment: Judgment normal.         Pertinent labs and imaging studies reviewed    Assessment:       1. Diarrhea due to malabsorption    2. Ileostomy in place        Plan:       Clarified how medication should be taken.  Continue Questran t.i.d. and use polyethylene to thicken liquids  Significantly limited by anatomy.  Patient has history of total colectomy and now cholecystectomy.  Viberzi contraindicated  Prolonged QTC    30 minutes of total time spent on " the encounter, which includes face to face time and non-face to face time preparing to see the patient (eg, review of tests), Obtaining and/or reviewing separately obtained history, Documenting clinical information in the electronic or other health record, Independently interpreting results (not separately reported) and communicating results to the patient/family/caregiver, or Care coordination (not separately reported).     (Portions of this note were dictated using voice recognition software and may contain dictation related errors in spelling/grammar/syntax not found on text review)

## 2023-09-06 NOTE — PROGRESS NOTES
Subjective:       Patient ID: Jarrett Charlton Jr. is a 73 y.o. male.    Chief Complaint: 3 month check up     HPI  Pt visit today for f/u his BP has been fluctuating  high and low pt feels worse when it is low feel weak and want to pass out he take midodrine prn for low BP and if not effective has to go to ER for IVF pt has been drinking plenty of water and he take low dose toprol XL 25 mg and hydralazine 25 mg prn when BP is high he denies sob cp he has been seen by cardiology his Echo good EF and mild regurgitation of valves. His main physical c/o today is rt knee swelling and severe pain pt taking tylenol not better no trauma no fever chill no other joint pain pain worse when he ambulate no other joint pain  Review of Systems    Objective:      Physical Exam  Vitals and nursing note reviewed.   Constitutional:       General: He is not in acute distress.     Appearance: He is well-developed.   HENT:      Head: Normocephalic and atraumatic.      Right Ear: External ear normal.      Left Ear: External ear normal.      Nose: Nose normal.      Mouth/Throat:      Pharynx: No oropharyngeal exudate.   Eyes:      Extraocular Movements: Extraocular movements intact.      Conjunctiva/sclera: Conjunctivae normal.      Pupils: Pupils are equal, round, and reactive to light.   Neck:      Thyroid: No thyromegaly.   Cardiovascular:      Rate and Rhythm: Normal rate and regular rhythm.      Heart sounds: Normal heart sounds. No murmur heard.     No friction rub. No gallop.   Pulmonary:      Effort: Pulmonary effort is normal. No respiratory distress.      Breath sounds: Normal breath sounds. No wheezing.   Abdominal:      General: Bowel sounds are normal. There is no distension.      Palpations: Abdomen is soft.      Tenderness: There is no abdominal tenderness.   Musculoskeletal:         General: Tenderness (sl warm totouch and small effusion rt knee tederness around knee cap) present. No deformity. Normal range of motion.       Cervical back: Normal range of motion and neck supple.   Lymphadenopathy:      Cervical: No cervical adenopathy.   Skin:     General: Skin is warm and dry.      Findings: No erythema or rash.   Neurological:      General: No focal deficit present.      Mental Status: He is alert and oriented to person, place, and time.   Psychiatric:         Thought Content: Thought content normal.         Judgment: Judgment normal.      Comments: Anxious frustrated sometime due frequent ER visit and still getting weekly IV saline infusion         Assessment:       1. Gouty arthritis of right knee    2. Nocturnal leg cramps    3. Labile hypertension    4. Hyperlipidemia, unspecified hyperlipidemia type    5. Type 2 diabetes mellitus with stage 3b chronic kidney disease, without long-term current use of insulin        Plan:       Gouty arthritis of right knee  -     predniSONE (DELTASONE) 20 MG tablet; Take 1 tablet (20 mg total) by mouth 2 (two) times daily.  Dispense: 8 tablet; Refill: 0  -     oxyCODONE (ROXICODONE) 5 MG immediate release tablet; Take 1 tablet (5 mg total) by mouth every 8 (eight) hours as needed for Pain.  Dispense: 20 tablet; Refill: 0    Nocturnal leg cramps  -     magnesium oxide (MAG-OX) 400 mg (241.3 mg magnesium) tablet; Take 1 tablet (400 mg total) by mouth once daily.  Dispense: 30 tablet; Refill: 2    Labile hypertension  -     Hypertension Digital Medicine (HDMP) Enrollment Order    Hyperlipidemia, unspecified hyperlipidemia type  Comments:  lipid profile fairly controlled  Orders:  -     Lipids Digital Medicine (LDMP) Enrollment Order  -     Lipids Digital Medicine (LDMP): Assign Onboarding Questionnaires    Type 2 diabetes mellitus with stage 3b chronic kidney disease, without long-term current use of insulin  Comments:  well controlled Hgba1c 6.5 no change in tx  Orders:  -     Diabetes Digital Medicine (DDMP) Enrollment Order        Medication List with Changes/Refills   New Medications    MAGNESIUM  OXIDE (MAG-OX) 400 MG (241.3 MG MAGNESIUM) TABLET    Take 1 tablet (400 mg total) by mouth once daily.    OXYCODONE (ROXICODONE) 5 MG IMMEDIATE RELEASE TABLET    Take 1 tablet (5 mg total) by mouth every 8 (eight) hours as needed for Pain.    PREDNISONE (DELTASONE) 20 MG TABLET    Take 1 tablet (20 mg total) by mouth 2 (two) times daily.   Current Medications    ACCU-CHEK PAUL CONTROL SOLN SOLN    1 drop by NOT APPLICABLE route once daily.    ACCU-CHEK SOFTCLIX LANCETS [facility]    TEST BLOOD SUGAR THREE TIMES DAILY    ALLOPURINOL (ZYLOPRIM) 300 MG TABLET    Take 1.5 tablets (450 mg total) by mouth once daily.    ASPIRIN (ECOTRIN) 81 MG EC TABLET    Take 1 tablet (81 mg total) by mouth once daily.    BLOOD SUGAR DIAGNOSTIC (ONETOUCH ULTRA TEST) STRP    USE ONE STRIP TO CHECK GLUCOSE EVERY DAY    BLOOD-GLUCOSE METER (ACCU-CHEK GUIDE GLUCOSE METER) [facility]    Accucheck BID    CALCIUM-VITAMIN D3 (OS-DAKOTA 500 + D3) 500 MG-5 MCG (200 UNIT) PER TABLET    Take 1 tablet by mouth once daily.    CHOLESTYRAMINE-ASPARTAME (QUESTRAN LIGHT) 4 GRAM PWPK    Take 1 packet (4 g total) by mouth 3 (three) times daily with meals.    DROPSAFE ALCOHOL PREP PADS PADM    USE TOPICALLY 5 (FIVE) TIMES DAILY.    FLUDROCORTISONE (FLORINEF) 0.1 MG TAB    Take 1 tablet (100 mcg total) by mouth once daily.    GABAPENTIN (NEURONTIN) 100 MG CAPSULE    Take 2 capsules (200 mg total) by mouth 3 (three) times daily.    GLIMEPIRIDE (AMARYL) 4 MG TABLET    TAKE 1 TABLET (4 MG TOTAL) BY MOUTH 2 (TWO) TIMES DAILY BEFORE MEALS.    HYDRALAZINE (APRESOLINE) 25 MG TABLET    TAKE 1 TABLET THREE TIMES DAILY AS NEEDED FOR HIGH BLOOD PRESSURE: SYSTOLIC ABOVE 180 OR DIASTOLIC ABOVE 100.    LIDOCAINE (LIDODERM) 5 %    Place 1 patch onto the skin as needed. Remove & Discard patch within 12 hours or as directed by MD    METOPROLOL SUCCINATE (TOPROL-XL) 25 MG 24 HR TABLET    Take 1 tablet (25 mg total) by mouth once daily.    MIDODRINE (PROAMATINE) 10 MG TABLET    Take 1  tablet (10 mg total) by mouth daily as needed (Hypotension).    PANTOPRAZOLE (PROTONIX) 40 MG TABLET    Take 1 tablet (40 mg total) by mouth once daily.    PRAVASTATIN (PRAVACHOL) 40 MG TABLET    TAKE 1 TABLET ONE TIME DAILY   Discontinued Medications    MAGNESIUM OXIDE 400 MG MAGNESIUM TAB    1 po qdaily

## 2023-09-07 RX ORDER — OXYCODONE HYDROCHLORIDE 5 MG/1
5 TABLET ORAL EVERY 8 HOURS PRN
Qty: 20 TABLET | Refills: 0 | Status: SHIPPED | OUTPATIENT
Start: 2023-09-07 | End: 2023-10-26 | Stop reason: SDUPTHER

## 2023-09-08 ENCOUNTER — OFFICE VISIT (OUTPATIENT)
Dept: UROLOGY | Facility: CLINIC | Age: 73
End: 2023-09-08
Payer: MEDICARE

## 2023-09-08 ENCOUNTER — INFUSION (OUTPATIENT)
Dept: INFECTIOUS DISEASES | Facility: HOSPITAL | Age: 73
End: 2023-09-08
Attending: INTERNAL MEDICINE
Payer: MEDICARE

## 2023-09-08 VITALS
HEART RATE: 69 BPM | RESPIRATION RATE: 16 BRPM | DIASTOLIC BLOOD PRESSURE: 90 MMHG | BODY MASS INDEX: 27.54 KG/M2 | WEIGHT: 214.63 LBS | HEIGHT: 74 IN | OXYGEN SATURATION: 95 % | SYSTOLIC BLOOD PRESSURE: 187 MMHG | TEMPERATURE: 98 F

## 2023-09-08 VITALS
DIASTOLIC BLOOD PRESSURE: 76 MMHG | HEART RATE: 69 BPM | WEIGHT: 214.75 LBS | HEIGHT: 74 IN | SYSTOLIC BLOOD PRESSURE: 119 MMHG | BODY MASS INDEX: 27.56 KG/M2

## 2023-09-08 DIAGNOSIS — Z90.79 S/P TURP: ICD-10-CM

## 2023-09-08 DIAGNOSIS — Z93.3 COLOSTOMY PRESENT: ICD-10-CM

## 2023-09-08 DIAGNOSIS — C61 PROSTATE CANCER: Primary | ICD-10-CM

## 2023-09-08 DIAGNOSIS — E11.22 TYPE 2 DIABETES MELLITUS WITH STAGE 3B CHRONIC KIDNEY DISEASE, WITHOUT LONG-TERM CURRENT USE OF INSULIN: ICD-10-CM

## 2023-09-08 DIAGNOSIS — E86.0 DEHYDRATION: Primary | ICD-10-CM

## 2023-09-08 DIAGNOSIS — N18.32 TYPE 2 DIABETES MELLITUS WITH STAGE 3B CHRONIC KIDNEY DISEASE, WITHOUT LONG-TERM CURRENT USE OF INSULIN: ICD-10-CM

## 2023-09-08 PROCEDURE — 3288F PR FALLS RISK ASSESSMENT DOCUMENTED: ICD-10-PCS | Mod: HCNC,CPTII,S$GLB, | Performed by: UROLOGY

## 2023-09-08 PROCEDURE — 3044F HG A1C LEVEL LT 7.0%: CPT | Mod: HCNC,CPTII,S$GLB, | Performed by: UROLOGY

## 2023-09-08 PROCEDURE — 3078F DIAST BP <80 MM HG: CPT | Mod: HCNC,CPTII,S$GLB, | Performed by: UROLOGY

## 2023-09-08 PROCEDURE — 3066F NEPHROPATHY DOC TX: CPT | Mod: HCNC,CPTII,S$GLB, | Performed by: UROLOGY

## 2023-09-08 PROCEDURE — 3288F FALL RISK ASSESSMENT DOCD: CPT | Mod: HCNC,CPTII,S$GLB, | Performed by: UROLOGY

## 2023-09-08 PROCEDURE — 99214 OFFICE O/P EST MOD 30 MIN: CPT | Mod: HCNC,S$GLB,, | Performed by: UROLOGY

## 2023-09-08 PROCEDURE — 1126F AMNT PAIN NOTED NONE PRSNT: CPT | Mod: HCNC,CPTII,S$GLB, | Performed by: UROLOGY

## 2023-09-08 PROCEDURE — 25000003 PHARM REV CODE 250: Mod: HCNC | Performed by: NURSE PRACTITIONER

## 2023-09-08 PROCEDURE — 1101F PR PT FALLS ASSESS DOC 0-1 FALLS W/OUT INJ PAST YR: ICD-10-PCS | Mod: HCNC,CPTII,S$GLB, | Performed by: UROLOGY

## 2023-09-08 PROCEDURE — 1159F PR MEDICATION LIST DOCUMENTED IN MEDICAL RECORD: ICD-10-PCS | Mod: HCNC,CPTII,S$GLB, | Performed by: UROLOGY

## 2023-09-08 PROCEDURE — 3044F PR MOST RECENT HEMOGLOBIN A1C LEVEL <7.0%: ICD-10-PCS | Mod: HCNC,CPTII,S$GLB, | Performed by: UROLOGY

## 2023-09-08 PROCEDURE — 96360 HYDRATION IV INFUSION INIT: CPT | Mod: HCNC

## 2023-09-08 PROCEDURE — 3074F SYST BP LT 130 MM HG: CPT | Mod: HCNC,CPTII,S$GLB, | Performed by: UROLOGY

## 2023-09-08 PROCEDURE — 3078F PR MOST RECENT DIASTOLIC BLOOD PRESSURE < 80 MM HG: ICD-10-PCS | Mod: HCNC,CPTII,S$GLB, | Performed by: UROLOGY

## 2023-09-08 PROCEDURE — 1126F PR PAIN SEVERITY QUANTIFIED, NO PAIN PRESENT: ICD-10-PCS | Mod: HCNC,CPTII,S$GLB, | Performed by: UROLOGY

## 2023-09-08 PROCEDURE — 1101F PT FALLS ASSESS-DOCD LE1/YR: CPT | Mod: HCNC,CPTII,S$GLB, | Performed by: UROLOGY

## 2023-09-08 PROCEDURE — 3008F BODY MASS INDEX DOCD: CPT | Mod: HCNC,CPTII,S$GLB, | Performed by: UROLOGY

## 2023-09-08 PROCEDURE — 3074F PR MOST RECENT SYSTOLIC BLOOD PRESSURE < 130 MM HG: ICD-10-PCS | Mod: HCNC,CPTII,S$GLB, | Performed by: UROLOGY

## 2023-09-08 PROCEDURE — 99999 PR PBB SHADOW E&M-EST. PATIENT-LVL IV: CPT | Mod: PBBFAC,HCNC,, | Performed by: UROLOGY

## 2023-09-08 PROCEDURE — 1159F MED LIST DOCD IN RCRD: CPT | Mod: HCNC,CPTII,S$GLB, | Performed by: UROLOGY

## 2023-09-08 PROCEDURE — 99214 PR OFFICE/OUTPT VISIT, EST, LEVL IV, 30-39 MIN: ICD-10-PCS | Mod: HCNC,S$GLB,, | Performed by: UROLOGY

## 2023-09-08 PROCEDURE — 3066F PR DOCUMENTATION OF TREATMENT FOR NEPHROPATHY: ICD-10-PCS | Mod: HCNC,CPTII,S$GLB, | Performed by: UROLOGY

## 2023-09-08 PROCEDURE — 3008F PR BODY MASS INDEX (BMI) DOCUMENTED: ICD-10-PCS | Mod: HCNC,CPTII,S$GLB, | Performed by: UROLOGY

## 2023-09-08 PROCEDURE — 99999 PR PBB SHADOW E&M-EST. PATIENT-LVL IV: ICD-10-PCS | Mod: PBBFAC,HCNC,, | Performed by: UROLOGY

## 2023-09-08 RX ORDER — HEPARIN 100 UNIT/ML
500 SYRINGE INTRAVENOUS
Status: CANCELLED | OUTPATIENT
Start: 2023-09-12

## 2023-09-08 RX ORDER — SODIUM CHLORIDE 0.9 % (FLUSH) 0.9 %
10 SYRINGE (ML) INJECTION
Status: CANCELLED | OUTPATIENT
Start: 2023-09-12

## 2023-09-08 RX ADMIN — SODIUM CHLORIDE 1000 ML: 9 INJECTION, SOLUTION INTRAVENOUS at 08:09

## 2023-09-08 NOTE — PROGRESS NOTES
Pt received 1L NS bolus over 1 hr., tolerated well.     Limited head-to-toe assessment due to privacy issues and visit reason though the opportunity was given for patient to express any concerns

## 2023-09-08 NOTE — PROGRESS NOTES
CC: elevated PSA with suspected prostate cancer    Jarrett Charlton Jr. is a 73 y.o. man who is here for Follow-up      Jarrett Charlton Jr. is a 70 y.o. man who is here for the evaluation of elevated PSA.  He had Lupron injection treatment for suspected prostate cancer with rising PSA      a history of elevated PSA, negative TUR biopsy in January. History of APR for crohn's disease.  MRI- 15 ccs. PI-RADS 4, 1 cm lesion, right base PZ. Negative extra prostatic extension,NVB,SV nodes.     Hx of no rectum as well as concerning PIRADS lesion on MRI.   Had TURP for biopsying suspicious area      Date: 01/23/2019  Procedure(s) Performed:   TURP  Findings:   Open bladder neck some regrowth of adenoma and suspicious area in right mid prostate   Right side of prostate resected and sent for pathology  SPECIMEN  1) Right prostate chips.  FINAL PATHOLOGIC DIAGNOSIS  Right prostate gland, transurethral resection:  - Benign prostatic tissue with nodular stromal hyperplasia  - Negative for malignancy     Since TURP, he noted that he can void better with better urine flow.  Blood in urine resolved.  However, he reports Occasional ZEB but it got all better.  No more ZEB reported.  He is here with another MRI of prostate following TURP because of still rising PSA up to 6.3 from 4.1.     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  He was not interested in SUDS.  S/p colon surgery and his anus is closed.  Therefore dong TRUS bx of prostate is not feasible.  After discussion, we decided to try finasteride to see whether it will lower his PSA.   He has been on finasteride for the past couple of years. Following TURP in 1/2019, we decided to stop taking finasteride.     When he was considered to undergo TURP, we could not do his surgery because he was not able to stop ASA or Plavix due to fresh vascular stent.  He got a clearance that he can hold  off plavix and ASA prior to TURP.  Hx of ulcerative colitis, had colon surgery back in 1985 and his anus was closed.  Has a neuropathy on the right leg.  Hx of sciatic nerve on both sides and received injection on the back.  Hx of diabetes diagnosed 2 years ago.  No family hx of prostate cancer.  He is a .  Denies flank pain, dysuira, hematuria .       MRI of prostate 8/20/19  Previous biopsy: Negative on 01/23/2019  PSA: 6.3 ng/mL (08/16/2019)  Prior therapy: TURP  Prostate: 3.6 x 3.5 x 3.0 cm corresponding to a computed volume of 15.2 cc.  Peripheral zone: Focal abnormalities with imaging features concerning for prostate cancer.  Lesion (MARYCARMEN) #1  Location: Side: right; Region: base; Zone: posterior peripheral zone laterally  Greatest dimension: 1.0 cm  T2-WI: T2 SI: circumscribed, homogeneous moderate hypointensity--score 4 or 5  DWI/ADC: DWI: Focal markedly hypointense on ADC and markedly hyperintense on high b-value DWI--score 4 or 5  DCE: Positive  Extraprostatic extension: No gross extraprostatic extension noting postsurgical changes limits evaluation.  PI-RADS assessment category: 4    Transitional zone: Mostly surgically resected.  Neurovascular bundle: Normal.  Seminal vesicles:  SV invasion: None  Adjacent Organ Involvement: No focal bladder wall thickening.  No rectal involvement.  Lymphadenopathy: None     On 11/22/19, pt underwent perineal biopsy by IR for CT guided needle bx for the right posterolateral prostate lesion;  Elevated prostate specific antigen (PSA)  Pathology:  BIOPSY OF PROSTATE BED:  FIBROFATTY TISSUE WITH NO NEOPLASIA IDENTIFIED     Pt had his PSA repeated and is now elevated to 7.9.  He was very anxious about possible prostate cancer and would like to to something about it.  So we have started ADT.  He is here with PSA follow up.  Denies any problems. No hot flashes, decreased energy level.    Past Medical History:   Diagnosis Date    Basal cell carcinoma     BPH (benign  prostatic hyperplasia)     s/p TURP    Carotid stenosis     Chronic kidney disease     Claudication     Coronary artery disease     DDD (degenerative disc disease) 10/21/2013    Diabetes mellitus with renal complications     Disc disease, degenerative, cervical     Encounter for blood transfusion     GERD (gastroesophageal reflux disease)     Gout, chronic     History of ulcerative colitis     s/p colectomy and ileostomy    HLD (hyperlipidemia)     HTN (hypertension)     Ileostomy in place 1982    RBBB     Squamous cell carcinoma 03/08/2018    Left superior helix near insertion    Squamous cell carcinoma 04/12/2018    Left forearm x 5    Ventricular tachycardia      Past Surgical History:   Procedure Laterality Date    cardiac stents      CATARACT EXTRACTION Bilateral     CERVICAL FUSION      CHOLECYSTECTOMY N/A 2/14/2023    Procedure: CHOLECYSTECTOMY;  Surgeon: Mike Joyce MD;  Location: [facility] OR;  Service: General;  Laterality: N/A;  very high probabilty of converison to open    colectomy and ileostomy  1985    ENDOSCOPIC ULTRASOUND OF UPPER GASTROINTESTINAL TRACT N/A 2/10/2023    Procedure: ULTRASOUND, UPPER GI TRACT, ENDOSCOPIC;  Surgeon: Poli Fuller MD;  Location: [facility] ENDO;  Service: Endoscopy;  Laterality: N/A;    ERCP N/A 2/10/2023    Procedure: ERCP (ENDOSCOPIC RETROGRADE CHOLANGIOPANCREATOGRAPHY);  Surgeon: Poli Fuller MD;  Location: [facility] ENDO;  Service: Endoscopy;  Laterality: N/A;    EXCISION OF PAROTID GLAND Left 12/18/2020    Procedure: EXCISION, PAROTID GLAND;  Surgeon: Michael Pinzon MD;  Location: [address];  Service: ENT;  Laterality: Left;    INSERTION OF IMPLANTABLE LOOP RECORDER  06/07/2021    INSERTION OF IMPLANTABLE LOOP RECORDER Left 6/7/2021    Procedure: INSERTION, IMPLANTABLE LOOP RECORDER;  Surgeon: Romario Dao MD;  Location: [facility] CATH LAB;  Service: Cardiology;  Laterality: Left;    LEFT HEART CATHETERIZATION Right 4/15/2021    Procedure: CATHETERIZATION,  HEART, LEFT;  Surgeon: Marcio Jones MD;  Location: [facility] CATH LAB;  Service: Cardiology;  Laterality: Right;    LYSIS OF ADHESIONS N/A 11/9/2020    Procedure: LYSIS, ADHESIONS,  ERAS low;  Surgeon: CED Haley MD;  Location: [facility] OR 2ND FLR;  Service: Colon and Rectal;  Laterality: N/A;    LYSIS OF ADHESIONS N/A 2/14/2023    Procedure: LYSIS, ADHESIONS;  Surgeon: Mike Joyce MD;  Location: [facility] OR;  Service: General;  Laterality: N/A;    POUCHOSCOPY N/A 4/6/2022    Procedure: ENDOSCOPY, POUCH, SMALL INTESTINE, DIAGNOSTIC;  Surgeon: Dieter Juarez MD;  Location: [facility] ENDO (2ND FLR);  Service: Endoscopy;  Laterality: N/A;    REPAIR, HERNIA, PARASTOMAL N/A 11/9/2020    Procedure: REPAIR, HERNIA, PARASTOMAL;  Surgeon: CED Haley MD;  Location: [facility] OR 2ND FLR;  Service: Colon and Rectal;  Laterality: N/A;    TRANSURETHRAL RESECTION OF PROSTATE (TURP) WITHOUT USE OF LASER N/A 1/23/2019    Procedure: TURP, WITHOUT USING LASER BIPOLAR;  Surgeon: Catarino Mota MD;  Location: [facility] OR 1ST FLR;  Service: Urology;  Laterality: N/A;  1.5 HOURS     Social History     Tobacco Use    Smoking status: Never    Smokeless tobacco: Never   Substance Use Topics    Alcohol use: No    Drug use: No     Family History   Problem Relation Age of Onset    Cancer Father     Diabetes Father     Dementia Mother     Melanoma Neg Hx     Hypertension Neg Hx     Arthritis Neg Hx      Allergy:  Review of patient's allergies indicates:   Allergen Reactions    Crestor [rosuvastatin]     Lipitor [atorvastatin]      Outpatient Encounter Medications as of 9/8/2023   Medication Sig Dispense Refill    ACCU-CHEK PAUL CONTROL SOLN Soln 1 drop by NOT APPLICABLE route once daily.      ACCU-CHEK SOFTCLIX LANCETS Misc TEST BLOOD SUGAR THREE TIMES DAILY 300 each 3    allopurinoL (ZYLOPRIM) 300 MG tablet Take 1.5 tablets (450 mg total) by mouth once daily. 135 tablet 3    blood sugar diagnostic (ONETOUCH ULTRA TEST) Strp USE ONE STRIP  TO CHECK GLUCOSE EVERY  each 11    blood-glucose meter (ACCU-CHEK GUIDE GLUCOSE METER) [facility] [place] BID 1 each 0    cholestyramine-aspartame (QUESTRAN LIGHT) 4 gram PwPk Take 1 packet (4 g total) by mouth 3 (three) times daily with meals. 270 packet 1    DROPSAFE ALCOHOL PREP PADS PadM USE TOPICALLY 5 (FIVE) TIMES DAILY. 140 each 1    fludrocortisone (FLORINEF) 0.1 mg Tab Take 1 tablet (100 mcg total) by mouth once daily. 180 tablet 0    glimepiride (AMARYL) 4 MG tablet TAKE 1 TABLET (4 MG TOTAL) BY MOUTH 2 (TWO) TIMES DAILY BEFORE MEALS. 180 tablet 1    hydrALAZINE (APRESOLINE) 25 MG tablet TAKE 1 TABLET THREE TIMES DAILY AS NEEDED FOR HIGH BLOOD PRESSURE: SYSTOLIC ABOVE 180 OR DIASTOLIC ABOVE 100. 90 tablet 1    LIDOcaine (LIDODERM) 5 % Place 1 patch onto the skin as needed. Remove & Discard patch within 12 hours or as directed by MD 30 patch 0    magnesium oxide (MAG-OX) 400 mg (241.3 mg magnesium) tablet Take 1 tablet (400 mg total) by mouth once daily. 30 tablet 2    metoprolol succinate (TOPROL-XL) 25 MG 24 hr tablet Take 1 tablet (25 mg total) by mouth once daily. 90 tablet 1    midodrine (PROAMATINE) 10 MG tablet Take 1 tablet (10 mg total) by mouth daily as needed (Hypotension). 30 tablet 0    oxyCODONE (ROXICODONE) 5 MG immediate release tablet Take 1 tablet (5 mg total) by mouth every 8 (eight) hours as needed for Pain. 20 tablet 0    pantoprazole (PROTONIX) 40 MG tablet Take 1 tablet (40 mg total) by mouth once daily. 90 tablet 0    pravastatin (PRAVACHOL) 40 MG tablet TAKE 1 TABLET ONE TIME DAILY 90 tablet 1    predniSONE (DELTASONE) 20 MG tablet Take 1 tablet (20 mg total) by mouth 2 (two) times daily. 8 tablet 0    aspirin (ECOTRIN) 81 MG EC tablet Take 1 tablet (81 mg total) by mouth once daily. 30 tablet 1    [] butalbital-acetaminophen-caffeine -40 mg (FIORICET, ESGIC) -40 mg per tablet Take 1 tablet by mouth 2 (two) times daily as needed for Headaches. 30 tablet 0     calcium-vitamin D3 (OS-DAKOTA 500 + D3) 500 mg-5 mcg (200 unit) per tablet Take 1 tablet by mouth once daily. 30 tablet 1    gabapentin (NEURONTIN) 100 MG capsule Take 2 capsules (200 mg total) by mouth 3 (three) times daily. 180 capsule 1    [DISCONTINUED] DROPSAFE ALCOHOL PREP PADS PadM USE TOPICALLY 5 (FIVE) TIMES DAILY. 100 each 5    [DISCONTINUED] hydrALAZINE (APRESOLINE) 25 MG tablet Take 1 tablet (25 mg total) by mouth 3 (three) times daily as needed (for high BP systolic >180 or diastolic > 100). 90 tablet 1    [DISCONTINUED] magnesium oxide 400 mg magnesium Tab 1 po qdaily 90 tablet 3    [DISCONTINUED] oxyCODONE (ROXICODONE) 5 MG immediate release tablet Take 1 tablet (5 mg total) by mouth every 8 (eight) hours as needed for Pain. 20 tablet 0     Facility-Administered Encounter Medications as of 9/8/2023   Medication Dose Route Frequency Provider Last Rate Last Admin    [COMPLETED] sodium chloride 0.9% bolus 1,000 mL 1,000 mL  1,000 mL Intravenous 1 time in Clinic/HOD Bianka Arevalo NP   Stopped at 09/08/23 0916    sodium chloride 0.9% in 1,000 mL infusion   Intravenous Continuous Order, Paper         Review of Systems   ROS  Physical Exam     Vitals:    09/08/23 0935   BP: 119/76   Pulse: 69       Physical Exam  Genitalia:  Scrotum: no rash or lesion  Normal symmetric epididymis without masses  Normal vas palpated  Normal size, symmetric testicles with no masses   Normal urethral meatus with no discharge  Normal circumcised penis with no lesion   Rectal:  Normal perineum and anus upon inspection.  Normal tone, no masses or tenderness;     LABS:  Lab Results   Component Value Date    PSA 0.18 11/19/2020    PSA 7.9 (H) 04/20/2020    PSA 2.1 09/15/2014    PSA 2.16 05/13/2013    PSA 1.2 03/19/2012    PSA 1.0 02/19/2010    PSA 1.4 02/17/2009    PSA 1.1 04/30/2007    PSA 1.0 05/12/2006    PSA 1.0 04/22/2005    PSADIAG 0.69 09/08/2023    PSADIAG 0.08 11/22/2022    PSADIAG 0.03 03/04/2022    PSADIAG 0.03  12/16/2021    PSADIAG 0.02 10/29/2021    PSADIAG 0.02 09/14/2021    PSADIAG 0.04 05/25/2021    PSADIAG 0.10 02/11/2021    PSADIAG 0.54 08/11/2020    PSADIAG 6.3 (H) 08/16/2019     Results for orders placed or performed in visit on 11/22/22   Prostate Specific Antigen, Diagnostic   Result Value Ref Range    PSA Diagnostic 0.08 0.00 - 4.00 ng/mL   Results for orders placed or performed in visit on 03/04/22   PROSTATE SPECIFIC ANTIGEN, DIAGNOSTIC   Result Value Ref Range    PSA Diagnostic 0.03 0.00 - 4.00 ng/mL   Results for orders placed or performed in visit on 12/16/21   PROSTATE SPECIFIC ANTIGEN, DIAGNOSTIC   Result Value Ref Range    PSA Diagnostic 0.03 0.00 - 4.00 ng/mL     Lab Results   Component Value Date    CREATININE 3.1 (H) 08/25/2023    CREATININE 2.3 (H) 08/24/2023    CREATININE 3.7 (H) 08/07/2023     Results for orders placed or performed in visit on 11/22/22   Testosterone   Result Value Ref Range    Testosterone, Total 43 (L) 304 - 1227 ng/dL   Results for orders placed or performed in visit on 03/04/22   Testosterone   Result Value Ref Range    Testosterone, Total 21 (L) 304 - 1227 ng/dL   Results for orders placed or performed in visit on 12/16/21   Testosterone   Result Value Ref Range    Testosterone, Total 15 (L) 304 - 1227 ng/dL     Urine Culture, Routine   Date Value Ref Range Status   02/20/2019 No significant growth  Final     Hemoglobin A1C   Date Value Ref Range Status   06/26/2023 6.3 (H) 4.0 - 5.6 % Final     Comment:     ADA Screening Guidelines:  5.7-6.4%  Consistent with prediabetes  >or=6.5%  Consistent with diabetes    High levels of fetal hemoglobin interfere with the HbA1C  assay. Heterozygous hemoglobin variants (HbS, HgC, etc)do  not significantly interfere with this assay.   However, presence of multiple variants may affect accuracy.     02/22/2023 6.6 (H) 4.0 - 5.6 % Final     Comment:     ADA Screening Guidelines:  5.7-6.4%  Consistent with prediabetes  >or=6.5%  Consistent  "with diabetes    High levels of fetal hemoglobin interfere with the HbA1C  assay. Heterozygous hemoglobin variants (HbS, HgC, etc)do  not significantly interfere with this assay.   However, presence of multiple variants may affect accuracy.         Radiology:  MRI of prostate 8/20/19  Previous biopsy: Negative on 01/23/2019  PSA: 6.3 ng/mL (08/16/2019)  Prior therapy: TURP  Prostate: 3.6 x 3.5 x 3.0 cm corresponding to a computed volume of 15.2 cc.  Peripheral zone: Focal abnormalities with imaging features concerning for prostate cancer.  Lesion (MARYCARMEN) #1  Location: Side: right; Region: base; Zone: posterior peripheral zone laterally  Greatest dimension: 1.0 cm  T2-WI: T2 SI: circumscribed, homogeneous moderate hypointensity--score 4 or 5  DWI/ADC: DWI: Focal markedly hypointense on ADC and markedly hyperintense on high b-value DWI--score 4 or 5  DCE: Positive  Extraprostatic extension: No gross extraprostatic extension noting postsurgical changes limits evaluation.  PI-RADS assessment category: 4    Transitional zone: Mostly surgically resected.  Neurovascular bundle: Normal.  Seminal vesicles:  SV invasion: None  Adjacent Organ Involvement: No focal bladder wall thickening.  No rectal involvement.  Lymphadenopathy: None  Assessment and Plan:  Jarrett was seen today for follow-up.    Diagnoses and all orders for this visit:    Prostate cancer  -     Prostate Specific Antigen, Diagnostic; Future  -     Prostate Specific Antigen, Diagnostic; Future    S/P TURP        hx of "suspected prostate cancer" with abnormal MRI of prostate with rising PSA up to 7.9 in 4/20/20.  Never proved that he has a prostate cancer with tissue diagnosis despite TURP.  Unable to perform prostate biopsy because hs anus is closed off.    his PSA is markedly down since Lupron injection   He received two doses of Lupron injection so far.  Last injection was given 12/20/20.    His PSA remained undetectable and has slightly risen.  Will check his " PSA today and follow up with serial PSA.  No need for lupron injection today.    I spent 25 minutes with the patient of which more than half was spent in direct consultation with the patient in regards to our treatment and plan.      Follow-up:  Follow up in about 6 months (around 3/8/2024) for PSA.

## 2023-09-11 ENCOUNTER — TELEPHONE (OUTPATIENT)
Dept: CARDIOLOGY | Facility: CLINIC | Age: 73
End: 2023-09-11
Payer: MEDICARE

## 2023-09-11 NOTE — TELEPHONE ENCOUNTER
Spoke with patient, was in ER on 9/9/23 for weakness.  Today, BP low, medications are not helping to bring it up, dizzy, muscle spasms.  Patient refuses to go to ER, states, they give IV fluids and nothing helps.  Informed provider is out of the office this week.  Scheduled appointment with colleague, Dr. Villalobos on Wednesday and placed on wait list for sooner appointment.  Advised he should go to ER, again patient refused.  He  will come to appointment on Wednesday.

## 2023-09-11 NOTE — TELEPHONE ENCOUNTER
----- Message from Yumiko Brown sent at 9/11/2023  8:22 AM CDT -----  Regarding: seen today - urgent req from patient  Contact: PT  [phone]  The patient called requesting to urgently be seen today  P states was in ER for low BP ,   Im getting 10/16  Please contact patient asap per request    No further information provided      Patient can be contacted @# [phone]

## 2023-09-12 ENCOUNTER — INFUSION (OUTPATIENT)
Dept: INFECTIOUS DISEASES | Facility: HOSPITAL | Age: 73
End: 2023-09-12
Payer: MEDICARE

## 2023-09-12 VITALS
RESPIRATION RATE: 20 BRPM | WEIGHT: 212 LBS | HEIGHT: 74 IN | OXYGEN SATURATION: 96 % | HEART RATE: 82 BPM | DIASTOLIC BLOOD PRESSURE: 93 MMHG | BODY MASS INDEX: 27.21 KG/M2 | TEMPERATURE: 98 F | SYSTOLIC BLOOD PRESSURE: 187 MMHG

## 2023-09-12 DIAGNOSIS — Z93.3 COLOSTOMY PRESENT: ICD-10-CM

## 2023-09-12 DIAGNOSIS — E86.0 DEHYDRATION: Primary | ICD-10-CM

## 2023-09-12 DIAGNOSIS — N18.32 TYPE 2 DIABETES MELLITUS WITH STAGE 3B CHRONIC KIDNEY DISEASE, WITHOUT LONG-TERM CURRENT USE OF INSULIN: ICD-10-CM

## 2023-09-12 DIAGNOSIS — E11.22 TYPE 2 DIABETES MELLITUS WITH STAGE 3B CHRONIC KIDNEY DISEASE, WITHOUT LONG-TERM CURRENT USE OF INSULIN: ICD-10-CM

## 2023-09-12 PROCEDURE — 96360 HYDRATION IV INFUSION INIT: CPT | Mod: HCNC

## 2023-09-12 PROCEDURE — 25000003 PHARM REV CODE 250: Mod: HCNC | Performed by: NURSE PRACTITIONER

## 2023-09-12 RX ORDER — HEPARIN 100 UNIT/ML
500 SYRINGE INTRAVENOUS
Status: CANCELLED | OUTPATIENT
Start: 2023-09-15

## 2023-09-12 RX ORDER — SODIUM CHLORIDE 0.9 % (FLUSH) 0.9 %
10 SYRINGE (ML) INJECTION
Status: CANCELLED | OUTPATIENT
Start: 2023-09-15

## 2023-09-12 RX ADMIN — SODIUM CHLORIDE 1000 ML: 9 INJECTION, SOLUTION INTRAVENOUS at 08:09

## 2023-09-12 NOTE — PLAN OF CARE
Patient counseled on infection prevention by excellent hand hygiene - verbalized understanding    Patient counseled on fall risk assessment and prevention at home and in public - verbalized understanding

## 2023-09-12 NOTE — PROGRESS NOTES
Pt received 1L NS bolus over 1 hr., tolerated well.     Limited head-to-toe assessment due to privacy issues and visit reason though the opportunity was given for patient to express any concerns.

## 2023-09-15 ENCOUNTER — INFUSION (OUTPATIENT)
Dept: INFECTIOUS DISEASES | Facility: HOSPITAL | Age: 73
End: 2023-09-15
Payer: MEDICARE

## 2023-09-15 VITALS
DIASTOLIC BLOOD PRESSURE: 81 MMHG | TEMPERATURE: 98 F | HEART RATE: 68 BPM | RESPIRATION RATE: 16 BRPM | WEIGHT: 212.63 LBS | OXYGEN SATURATION: 98 % | BODY MASS INDEX: 27.29 KG/M2 | HEIGHT: 74 IN | SYSTOLIC BLOOD PRESSURE: 174 MMHG

## 2023-09-15 DIAGNOSIS — E86.0 DEHYDRATION: Primary | ICD-10-CM

## 2023-09-15 DIAGNOSIS — Z93.3 COLOSTOMY PRESENT: ICD-10-CM

## 2023-09-15 DIAGNOSIS — N18.32 TYPE 2 DIABETES MELLITUS WITH STAGE 3B CHRONIC KIDNEY DISEASE, WITHOUT LONG-TERM CURRENT USE OF INSULIN: ICD-10-CM

## 2023-09-15 DIAGNOSIS — E11.22 TYPE 2 DIABETES MELLITUS WITH STAGE 3B CHRONIC KIDNEY DISEASE, WITHOUT LONG-TERM CURRENT USE OF INSULIN: ICD-10-CM

## 2023-09-15 PROCEDURE — 96360 HYDRATION IV INFUSION INIT: CPT | Mod: HCNC

## 2023-09-15 PROCEDURE — 25000003 PHARM REV CODE 250: Mod: HCNC | Performed by: NURSE PRACTITIONER

## 2023-09-15 RX ORDER — SODIUM CHLORIDE 0.9 % (FLUSH) 0.9 %
10 SYRINGE (ML) INJECTION
Status: CANCELLED | OUTPATIENT
Start: 2023-09-19

## 2023-09-15 RX ORDER — HEPARIN 100 UNIT/ML
500 SYRINGE INTRAVENOUS
Status: CANCELLED | OUTPATIENT
Start: 2023-09-19

## 2023-09-15 RX ADMIN — SODIUM CHLORIDE 1000 ML: 9 INJECTION, SOLUTION INTRAVENOUS at 08:09

## 2023-09-18 ENCOUNTER — TELEPHONE (OUTPATIENT)
Dept: CARDIOLOGY | Facility: CLINIC | Age: 73
End: 2023-09-18
Payer: MEDICARE

## 2023-09-18 ENCOUNTER — PATIENT MESSAGE (OUTPATIENT)
Dept: PRIMARY CARE CLINIC | Facility: CLINIC | Age: 73
End: 2023-09-18
Payer: MEDICARE

## 2023-09-18 DIAGNOSIS — I95.9 HYPOTENSION, UNSPECIFIED HYPOTENSION TYPE: Primary | ICD-10-CM

## 2023-09-18 NOTE — TELEPHONE ENCOUNTER
----- Message from Florencia Morocho sent at 9/18/2023  8:11 AM CDT -----  Regarding: Appt  Contact: Pt [phone]  Pt is calling to see if he can come to see provider today states his blood pressure in low and keep dropping please call

## 2023-09-18 NOTE — TELEPHONE ENCOUNTER
LOV 08/01/2023    BP still dropping, today his readings are:  5:00 BP 67/49  8:29 BP 93/63    Patient states Midodrine is not helping.  He has been to ER several times, is told he is dehydrated, given fluids and discharged.

## 2023-09-19 ENCOUNTER — INFUSION (OUTPATIENT)
Dept: INFECTIOUS DISEASES | Facility: HOSPITAL | Age: 73
End: 2023-09-19
Payer: MEDICARE

## 2023-09-19 VITALS
HEIGHT: 74 IN | SYSTOLIC BLOOD PRESSURE: 136 MMHG | BODY MASS INDEX: 27.03 KG/M2 | OXYGEN SATURATION: 98 % | WEIGHT: 210.63 LBS | RESPIRATION RATE: 18 BRPM | HEART RATE: 82 BPM | DIASTOLIC BLOOD PRESSURE: 98 MMHG | TEMPERATURE: 98 F

## 2023-09-19 DIAGNOSIS — N18.32 TYPE 2 DIABETES MELLITUS WITH STAGE 3B CHRONIC KIDNEY DISEASE, WITHOUT LONG-TERM CURRENT USE OF INSULIN: ICD-10-CM

## 2023-09-19 DIAGNOSIS — E86.0 DEHYDRATION: Primary | ICD-10-CM

## 2023-09-19 DIAGNOSIS — Z93.3 COLOSTOMY PRESENT: ICD-10-CM

## 2023-09-19 DIAGNOSIS — E11.22 TYPE 2 DIABETES MELLITUS WITH STAGE 3B CHRONIC KIDNEY DISEASE, WITHOUT LONG-TERM CURRENT USE OF INSULIN: ICD-10-CM

## 2023-09-19 PROCEDURE — 96360 HYDRATION IV INFUSION INIT: CPT | Mod: HCNC

## 2023-09-19 PROCEDURE — 25000003 PHARM REV CODE 250: Mod: HCNC | Performed by: NURSE PRACTITIONER

## 2023-09-19 RX ORDER — HEPARIN 100 UNIT/ML
500 SYRINGE INTRAVENOUS
Status: CANCELLED | OUTPATIENT
Start: 2023-09-22

## 2023-09-19 RX ORDER — SODIUM CHLORIDE 0.9 % (FLUSH) 0.9 %
10 SYRINGE (ML) INJECTION
Status: DISCONTINUED | OUTPATIENT
Start: 2023-09-19 | End: 2023-09-19 | Stop reason: HOSPADM

## 2023-09-19 RX ORDER — HEPARIN 100 UNIT/ML
500 SYRINGE INTRAVENOUS
Status: DISCONTINUED | OUTPATIENT
Start: 2023-09-19 | End: 2023-09-19 | Stop reason: HOSPADM

## 2023-09-19 RX ORDER — SODIUM CHLORIDE 0.9 % (FLUSH) 0.9 %
10 SYRINGE (ML) INJECTION
Status: CANCELLED | OUTPATIENT
Start: 2023-09-22

## 2023-09-19 RX ADMIN — SODIUM CHLORIDE 1000 ML: 9 INJECTION, SOLUTION INTRAVENOUS at 08:09

## 2023-09-19 NOTE — TELEPHONE ENCOUNTER
Spoke with patient blood pressure has normalized  Will send to endocrinology to rule out adrenal insufficiency  The patient is not taking daily metoprolol or hydralazine  He only takes these when his blood pressure is high which is rare lately  He is compliant with fludrocortisone and midodrine daily  He has had frequent hypotension over the past few weeks though this is a chronic problem  Again advised that he should present to the emergency room if he develops hypotension

## 2023-09-20 DIAGNOSIS — I95.1 SYNCOPE DUE TO ORTHOSTATIC HYPOTENSION: ICD-10-CM

## 2023-09-20 RX ORDER — MIDODRINE HYDROCHLORIDE 10 MG/1
10 TABLET ORAL DAILY PRN
Qty: 90 TABLET | Refills: 3
Start: 2023-09-20 | End: 2023-11-13 | Stop reason: SDUPTHER

## 2023-09-20 NOTE — TELEPHONE ENCOUNTER
Please call patient.  He would like to discuss the midodrine that he is taking for low blood pressure.  Patient states the it is not helping his blood pressure.  He would like to know if he can take a bigger dose or take it more than once daily.  Blood pressure readings from today - - 5 am 86/53 and 1:30 pm 96/68.  He is also c/o dizziness.  Patient states he does not want to go to the ER because nothing is done when he goes.  He also needs a refill for the midodrine.      ----- Message from Grisel Marroquin sent at 9/20/2023 12:07 PM CDT -----  Contact: pt  Pt requesting call back RE: would like to discuss rx below    midodrine (PROAMATINE) 10 MG tablet      Confirmed contact below:  Contact Name:Jarrett Charlton  Phone Number: [phone]

## 2023-09-22 ENCOUNTER — TELEPHONE (OUTPATIENT)
Dept: ELECTROPHYSIOLOGY | Facility: CLINIC | Age: 73
End: 2023-09-22
Payer: MEDICARE

## 2023-09-22 ENCOUNTER — INFUSION (OUTPATIENT)
Dept: INFECTIOUS DISEASES | Facility: HOSPITAL | Age: 73
End: 2023-09-22
Payer: MEDICARE

## 2023-09-22 VITALS
OXYGEN SATURATION: 97 % | TEMPERATURE: 99 F | HEART RATE: 80 BPM | WEIGHT: 212.44 LBS | RESPIRATION RATE: 16 BRPM | HEIGHT: 74 IN | BODY MASS INDEX: 27.26 KG/M2

## 2023-09-22 DIAGNOSIS — E86.0 DEHYDRATION: Primary | ICD-10-CM

## 2023-09-22 DIAGNOSIS — Z93.3 COLOSTOMY PRESENT: ICD-10-CM

## 2023-09-22 DIAGNOSIS — N18.32 TYPE 2 DIABETES MELLITUS WITH STAGE 3B CHRONIC KIDNEY DISEASE, WITHOUT LONG-TERM CURRENT USE OF INSULIN: ICD-10-CM

## 2023-09-22 DIAGNOSIS — E11.22 TYPE 2 DIABETES MELLITUS WITH STAGE 3B CHRONIC KIDNEY DISEASE, WITHOUT LONG-TERM CURRENT USE OF INSULIN: ICD-10-CM

## 2023-09-22 PROCEDURE — 25000003 PHARM REV CODE 250: Mod: HCNC | Performed by: NURSE PRACTITIONER

## 2023-09-22 PROCEDURE — 96360 HYDRATION IV INFUSION INIT: CPT | Mod: HCNC

## 2023-09-22 RX ORDER — SODIUM CHLORIDE 0.9 % (FLUSH) 0.9 %
10 SYRINGE (ML) INJECTION
Status: CANCELLED | OUTPATIENT
Start: 2023-09-26

## 2023-09-22 RX ORDER — HEPARIN 100 UNIT/ML
500 SYRINGE INTRAVENOUS
Status: CANCELLED | OUTPATIENT
Start: 2023-09-26

## 2023-09-22 RX ADMIN — SODIUM CHLORIDE 1000 ML: 9 INJECTION, SOLUTION INTRAVENOUS at 08:09

## 2023-09-22 NOTE — TELEPHONE ENCOUNTER
[place] ILR alert received for nsVT > 10 beats.  Tachy episode lasting 15 secs, ecg c/w nsVT on 9/03/2023 at 1050 am.  Ecg reviewed with Dr Hernández.  Will cont to monitor.    Hx:  CAD s/p PCI, VT, SBO s/p colostomy, Orthostatic hypotension     Pt symptoms:  Pt states he has dizziness when he is hypotensive    Bp today 136/98    EF 60% on 12/6/2022     Pt taking Toprol-XL 25 mg daily when Bp elevated.

## 2023-09-26 ENCOUNTER — INFUSION (OUTPATIENT)
Dept: INFECTIOUS DISEASES | Facility: HOSPITAL | Age: 73
End: 2023-09-26
Payer: MEDICARE

## 2023-09-26 VITALS
DIASTOLIC BLOOD PRESSURE: 78 MMHG | HEART RATE: 72 BPM | SYSTOLIC BLOOD PRESSURE: 188 MMHG | HEIGHT: 74 IN | RESPIRATION RATE: 18 BRPM | BODY MASS INDEX: 27.5 KG/M2 | WEIGHT: 214.31 LBS | TEMPERATURE: 98 F | OXYGEN SATURATION: 98 %

## 2023-09-26 DIAGNOSIS — E86.0 DEHYDRATION: Primary | ICD-10-CM

## 2023-09-26 DIAGNOSIS — E11.22 TYPE 2 DIABETES MELLITUS WITH STAGE 3B CHRONIC KIDNEY DISEASE, WITHOUT LONG-TERM CURRENT USE OF INSULIN: ICD-10-CM

## 2023-09-26 DIAGNOSIS — N18.32 TYPE 2 DIABETES MELLITUS WITH STAGE 3B CHRONIC KIDNEY DISEASE, WITHOUT LONG-TERM CURRENT USE OF INSULIN: ICD-10-CM

## 2023-09-26 DIAGNOSIS — Z93.3 COLOSTOMY PRESENT: ICD-10-CM

## 2023-09-26 PROCEDURE — 96365 THER/PROPH/DIAG IV INF INIT: CPT | Mod: HCNC

## 2023-09-26 PROCEDURE — 25000003 PHARM REV CODE 250: Mod: HCNC | Performed by: NURSE PRACTITIONER

## 2023-09-26 RX ORDER — HEPARIN 100 UNIT/ML
500 SYRINGE INTRAVENOUS
Status: CANCELLED | OUTPATIENT
Start: 2023-09-29

## 2023-09-26 RX ORDER — SODIUM CHLORIDE 0.9 % (FLUSH) 0.9 %
10 SYRINGE (ML) INJECTION
Status: CANCELLED | OUTPATIENT
Start: 2023-09-29

## 2023-09-26 RX ADMIN — SODIUM CHLORIDE 1000 ML: 9 INJECTION, SOLUTION INTRAVENOUS at 08:09

## 2023-09-26 NOTE — PROGRESS NOTES
Pt currently receiving 1L NS bolus over 1 hr., tolerating well.     Limited head-to-toe assessment due to privacy issues and visit reason though the opportunity was given for patient to express any concerns.

## 2023-09-28 ENCOUNTER — TELEPHONE (OUTPATIENT)
Dept: DIABETES | Facility: CLINIC | Age: 73
End: 2023-09-28
Payer: MEDICARE

## 2023-09-28 PROBLEM — R42 DIZZINESS: Status: ACTIVE | Noted: 2023-09-28

## 2023-09-28 PROBLEM — R79.89 ELEVATED TROPONIN: Status: ACTIVE | Noted: 2023-09-28

## 2023-10-03 ENCOUNTER — INFUSION (OUTPATIENT)
Dept: INFECTIOUS DISEASES | Facility: HOSPITAL | Age: 73
End: 2023-10-03
Attending: INTERNAL MEDICINE
Payer: MEDICARE

## 2023-10-03 VITALS
HEART RATE: 71 BPM | WEIGHT: 214.19 LBS | HEIGHT: 74 IN | RESPIRATION RATE: 16 BRPM | TEMPERATURE: 98 F | SYSTOLIC BLOOD PRESSURE: 168 MMHG | BODY MASS INDEX: 27.49 KG/M2 | OXYGEN SATURATION: 98 % | DIASTOLIC BLOOD PRESSURE: 68 MMHG

## 2023-10-03 DIAGNOSIS — E86.0 DEHYDRATION: Primary | ICD-10-CM

## 2023-10-03 DIAGNOSIS — Z93.3 COLOSTOMY PRESENT: ICD-10-CM

## 2023-10-03 DIAGNOSIS — N18.32 TYPE 2 DIABETES MELLITUS WITH STAGE 3B CHRONIC KIDNEY DISEASE, WITHOUT LONG-TERM CURRENT USE OF INSULIN: ICD-10-CM

## 2023-10-03 DIAGNOSIS — E11.22 TYPE 2 DIABETES MELLITUS WITH STAGE 3B CHRONIC KIDNEY DISEASE, WITHOUT LONG-TERM CURRENT USE OF INSULIN: ICD-10-CM

## 2023-10-03 PROCEDURE — 25000003 PHARM REV CODE 250: Performed by: NURSE PRACTITIONER

## 2023-10-03 PROCEDURE — 96360 HYDRATION IV INFUSION INIT: CPT

## 2023-10-03 RX ORDER — HEPARIN 100 UNIT/ML
500 SYRINGE INTRAVENOUS
Status: CANCELLED | OUTPATIENT
Start: 2023-10-06

## 2023-10-03 RX ORDER — SODIUM CHLORIDE 0.9 % (FLUSH) 0.9 %
10 SYRINGE (ML) INJECTION
Status: CANCELLED | OUTPATIENT
Start: 2023-10-06

## 2023-10-03 RX ADMIN — SODIUM CHLORIDE 1000 ML: 9 INJECTION, SOLUTION INTRAVENOUS at 08:10

## 2023-10-06 ENCOUNTER — CLINICAL SUPPORT (OUTPATIENT)
Dept: CARDIOLOGY | Facility: HOSPITAL | Age: 73
End: 2023-10-06
Payer: MEDICARE

## 2023-10-06 ENCOUNTER — INFUSION (OUTPATIENT)
Dept: INFECTIOUS DISEASES | Facility: HOSPITAL | Age: 73
End: 2023-10-06
Payer: MEDICARE

## 2023-10-06 ENCOUNTER — TELEPHONE (OUTPATIENT)
Dept: NEUROLOGY | Facility: CLINIC | Age: 73
End: 2023-10-06
Payer: MEDICARE

## 2023-10-06 ENCOUNTER — OFFICE VISIT (OUTPATIENT)
Dept: NEPHROLOGY | Facility: CLINIC | Age: 73
End: 2023-10-06
Payer: MEDICARE

## 2023-10-06 ENCOUNTER — OUTPATIENT CASE MANAGEMENT (OUTPATIENT)
Dept: ADMINISTRATIVE | Facility: OTHER | Age: 73
End: 2023-10-06
Payer: MEDICARE

## 2023-10-06 VITALS
WEIGHT: 214.06 LBS | TEMPERATURE: 99 F | HEIGHT: 74 IN | RESPIRATION RATE: 18 BRPM | DIASTOLIC BLOOD PRESSURE: 79 MMHG | HEART RATE: 76 BPM | SYSTOLIC BLOOD PRESSURE: 165 MMHG | OXYGEN SATURATION: 95 % | BODY MASS INDEX: 27.47 KG/M2

## 2023-10-06 VITALS
HEART RATE: 77 BPM | DIASTOLIC BLOOD PRESSURE: 108 MMHG | SYSTOLIC BLOOD PRESSURE: 168 MMHG | OXYGEN SATURATION: 98 % | HEIGHT: 74 IN | BODY MASS INDEX: 27.45 KG/M2 | WEIGHT: 213.88 LBS

## 2023-10-06 DIAGNOSIS — N18.4 TYPE 2 DIABETES MELLITUS WITH STAGE 4 CHRONIC KIDNEY DISEASE, UNSPECIFIED WHETHER LONG TERM INSULIN USE: ICD-10-CM

## 2023-10-06 DIAGNOSIS — Z93.3 COLOSTOMY PRESENT: ICD-10-CM

## 2023-10-06 DIAGNOSIS — E87.20 ACIDOSIS: ICD-10-CM

## 2023-10-06 DIAGNOSIS — E11.22 TYPE 2 DIABETES MELLITUS WITH STAGE 4 CHRONIC KIDNEY DISEASE, UNSPECIFIED WHETHER LONG TERM INSULIN USE: ICD-10-CM

## 2023-10-06 DIAGNOSIS — G90.1 DYSAUTONOMIA: ICD-10-CM

## 2023-10-06 DIAGNOSIS — K94.19 ALTERED BOWEL ELIMINATION DUE TO INTESTINAL OSTOMY: ICD-10-CM

## 2023-10-06 DIAGNOSIS — N25.81 SECONDARY HYPERPARATHYROIDISM: ICD-10-CM

## 2023-10-06 DIAGNOSIS — N18.32 TYPE 2 DIABETES MELLITUS WITH STAGE 3B CHRONIC KIDNEY DISEASE, WITHOUT LONG-TERM CURRENT USE OF INSULIN: ICD-10-CM

## 2023-10-06 DIAGNOSIS — Z95.818 PRESENCE OF OTHER CARDIAC IMPLANTS AND GRAFTS: ICD-10-CM

## 2023-10-06 DIAGNOSIS — E86.0 DEHYDRATION: Primary | ICD-10-CM

## 2023-10-06 DIAGNOSIS — R09.89 LABILE BLOOD PRESSURE: ICD-10-CM

## 2023-10-06 DIAGNOSIS — N18.4 CHRONIC KIDNEY DISEASE, STAGE 4 (SEVERE): Primary | ICD-10-CM

## 2023-10-06 DIAGNOSIS — E11.22 TYPE 2 DIABETES MELLITUS WITH STAGE 3B CHRONIC KIDNEY DISEASE, WITHOUT LONG-TERM CURRENT USE OF INSULIN: ICD-10-CM

## 2023-10-06 DIAGNOSIS — D64.9 ANEMIA, UNSPECIFIED TYPE: ICD-10-CM

## 2023-10-06 PROCEDURE — 3008F PR BODY MASS INDEX (BMI) DOCUMENTED: ICD-10-PCS | Mod: HCNC,CPTII,S$GLB, | Performed by: NURSE PRACTITIONER

## 2023-10-06 PROCEDURE — 3077F PR MOST RECENT SYSTOLIC BLOOD PRESSURE >= 140 MM HG: ICD-10-PCS | Mod: HCNC,CPTII,S$GLB, | Performed by: NURSE PRACTITIONER

## 2023-10-06 PROCEDURE — 1159F MED LIST DOCD IN RCRD: CPT | Mod: HCNC,CPTII,S$GLB, | Performed by: NURSE PRACTITIONER

## 2023-10-06 PROCEDURE — 3080F DIAST BP >= 90 MM HG: CPT | Mod: HCNC,CPTII,S$GLB, | Performed by: NURSE PRACTITIONER

## 2023-10-06 PROCEDURE — 3044F HG A1C LEVEL LT 7.0%: CPT | Mod: HCNC,CPTII,S$GLB, | Performed by: NURSE PRACTITIONER

## 2023-10-06 PROCEDURE — 96365 THER/PROPH/DIAG IV INF INIT: CPT | Mod: HCNC

## 2023-10-06 PROCEDURE — 1160F RVW MEDS BY RX/DR IN RCRD: CPT | Mod: HCNC,CPTII,S$GLB, | Performed by: NURSE PRACTITIONER

## 2023-10-06 PROCEDURE — 1126F PR PAIN SEVERITY QUANTIFIED, NO PAIN PRESENT: ICD-10-PCS | Mod: HCNC,CPTII,S$GLB, | Performed by: NURSE PRACTITIONER

## 2023-10-06 PROCEDURE — 3066F PR DOCUMENTATION OF TREATMENT FOR NEPHROPATHY: ICD-10-PCS | Mod: HCNC,CPTII,S$GLB, | Performed by: NURSE PRACTITIONER

## 2023-10-06 PROCEDURE — 3066F NEPHROPATHY DOC TX: CPT | Mod: HCNC,CPTII,S$GLB, | Performed by: NURSE PRACTITIONER

## 2023-10-06 PROCEDURE — 1159F PR MEDICATION LIST DOCUMENTED IN MEDICAL RECORD: ICD-10-PCS | Mod: HCNC,CPTII,S$GLB, | Performed by: NURSE PRACTITIONER

## 2023-10-06 PROCEDURE — 3288F FALL RISK ASSESSMENT DOCD: CPT | Mod: HCNC,CPTII,S$GLB, | Performed by: NURSE PRACTITIONER

## 2023-10-06 PROCEDURE — 3080F PR MOST RECENT DIASTOLIC BLOOD PRESSURE >= 90 MM HG: ICD-10-PCS | Mod: HCNC,CPTII,S$GLB, | Performed by: NURSE PRACTITIONER

## 2023-10-06 PROCEDURE — 3288F PR FALLS RISK ASSESSMENT DOCUMENTED: ICD-10-PCS | Mod: HCNC,CPTII,S$GLB, | Performed by: NURSE PRACTITIONER

## 2023-10-06 PROCEDURE — 99214 PR OFFICE/OUTPT VISIT, EST, LEVL IV, 30-39 MIN: ICD-10-PCS | Mod: HCNC,S$GLB,, | Performed by: NURSE PRACTITIONER

## 2023-10-06 PROCEDURE — 1101F PT FALLS ASSESS-DOCD LE1/YR: CPT | Mod: HCNC,CPTII,S$GLB, | Performed by: NURSE PRACTITIONER

## 2023-10-06 PROCEDURE — 3008F BODY MASS INDEX DOCD: CPT | Mod: HCNC,CPTII,S$GLB, | Performed by: NURSE PRACTITIONER

## 2023-10-06 PROCEDURE — 1126F AMNT PAIN NOTED NONE PRSNT: CPT | Mod: HCNC,CPTII,S$GLB, | Performed by: NURSE PRACTITIONER

## 2023-10-06 PROCEDURE — 3044F PR MOST RECENT HEMOGLOBIN A1C LEVEL <7.0%: ICD-10-PCS | Mod: HCNC,CPTII,S$GLB, | Performed by: NURSE PRACTITIONER

## 2023-10-06 PROCEDURE — 3077F SYST BP >= 140 MM HG: CPT | Mod: HCNC,CPTII,S$GLB, | Performed by: NURSE PRACTITIONER

## 2023-10-06 PROCEDURE — 25000003 PHARM REV CODE 250: Mod: HCNC | Performed by: NURSE PRACTITIONER

## 2023-10-06 PROCEDURE — 1160F PR REVIEW ALL MEDS BY PRESCRIBER/CLIN PHARMACIST DOCUMENTED: ICD-10-PCS | Mod: HCNC,CPTII,S$GLB, | Performed by: NURSE PRACTITIONER

## 2023-10-06 PROCEDURE — G2066 INTER DEVC REMOTE 30D: HCPCS | Mod: HCNC | Performed by: INTERNAL MEDICINE

## 2023-10-06 PROCEDURE — 1101F PR PT FALLS ASSESS DOC 0-1 FALLS W/OUT INJ PAST YR: ICD-10-PCS | Mod: HCNC,CPTII,S$GLB, | Performed by: NURSE PRACTITIONER

## 2023-10-06 PROCEDURE — 99999 PR PBB SHADOW E&M-EST. PATIENT-LVL IV: ICD-10-PCS | Mod: PBBFAC,HCNC,, | Performed by: NURSE PRACTITIONER

## 2023-10-06 PROCEDURE — 99999 PR PBB SHADOW E&M-EST. PATIENT-LVL IV: CPT | Mod: PBBFAC,HCNC,, | Performed by: NURSE PRACTITIONER

## 2023-10-06 PROCEDURE — 99214 OFFICE O/P EST MOD 30 MIN: CPT | Mod: HCNC,S$GLB,, | Performed by: NURSE PRACTITIONER

## 2023-10-06 RX ORDER — SODIUM CHLORIDE 0.9 % (FLUSH) 0.9 %
10 SYRINGE (ML) INJECTION
Status: CANCELLED | OUTPATIENT
Start: 2023-10-10

## 2023-10-06 RX ORDER — SODIUM BICARBONATE 650 MG/1
650 TABLET ORAL DAILY
Qty: 90 TABLET | Refills: 3 | Status: SHIPPED | OUTPATIENT
Start: 2023-10-06 | End: 2024-02-12 | Stop reason: SDUPTHER

## 2023-10-06 RX ORDER — HEPARIN 100 UNIT/ML
500 SYRINGE INTRAVENOUS
Status: CANCELLED | OUTPATIENT
Start: 2023-10-10

## 2023-10-06 RX ADMIN — SODIUM CHLORIDE 1000 ML: 9 INJECTION, SOLUTION INTRAVENOUS at 08:10

## 2023-10-06 NOTE — TELEPHONE ENCOUNTER
----- Message from Bianka Arevalo NP sent at 10/6/2023 11:12 AM CDT -----  Hello,  [place] pt. He is due for f/u. Can you all pls assist with scheduling?  Thanks,  Bianka

## 2023-10-06 NOTE — TELEPHONE ENCOUNTER
Called and spoke w pt. Scheduled appt for 11/21 at 10:40am w Dr. Miramontes. Pt confirmed date/time of appt.

## 2023-10-06 NOTE — PROGRESS NOTES
"  Subjective:       Patient ID: Jarrett Charlton Jr. is a 73 y.o. White male who presents for follow-up evaluation of   CKD    HPI    Last seen by me in May 2021. Previously seen by Dr. Lyon in Aug 2019.      Patient presents for f/u of CKD.  Baseline creatinine had been 1.3-1.5 c frequent increases to 1.6-1.8 range an occasional AKIs c higher sCr. Had GRADY in late December 2019 c peak sCr of 2.3 mg/dL 2/2 volume depletion from high output in ileostomy. Has had hypomagnesemia in the past.  Had K of 5.9 in November; provided with K finder. Since December 2019, new sCr baseline 1.4-1.6 mg/dL. Has been 2.0-2.1 since May 2021.    Home BPs: high in morning; 15 minutes after waking it decreases and is "low all day" until about 5 p.m., it goes up, then he takes his medications.  Recent loop recorder implanted. Has been getting IVF weekly to every other week by cardiologist at [place]. Also recently started Florinef by PCP.    Heart cath on 4/15/21; no significant blockages. Was sent to [facility] and told he needs a pacemaker. Says this is scheduled 5/13. Has been told to be taking pedialyte; still with diarrhea occasionally but much improved overall.    Significant medical problems include Hypertension for 5 years, DM for 3 yrs, gout, ulcerative colitis with ostomy and hyperlipidemia, prostate cancer    The patient denies taking NSAIDs.  Had IV contrast with cardiologist for MRI on 4/3/2020.     Social history: retired, took care of wheelchair-bound wife, but she passed away in July 2020  Significant family hx includes: no known kidney disease in family.    Last renal US: Sept 2019, reviewed. Simple left renal cyst.    Update August 2021:  Colostomy output still more solid "oatmeal" texture with water. Takes immodium if output is more liquid.  Still getting IVF infusions every other week by cardiology.  No recent syncopal episodes.  Home BPs: occasionally SBP dropping to 100; takes BP prior to taking meds and holds meds " "if needed.  Did not take meds yet today because pressure was low today.  Now on Northera for low BP per cardiology.    Denies NSAIDs; no recent hospitalizations.     Update December 2021:   Returns for f/u of CKD.  Baseline sCr 1.7-1.8 mg/dL.  Home BPs: 130-160/60-90; occasionally drops to SBP of high 80s and takes midodrine   Feels dizzy and lightheaded when blood glucose drops below 80  Denies NSAIDs; no recent hospitalizations.   Diarrhea now being treated by Dr. Baum as IBS. Viberzi was just approved but has not been started yet. Still with intermittent diarrhea. Still getting IVF per cardiology every other week.    Update 4/4/22:  Returns for f/u of CKD.  Baseline sCr 1.7-1.8 mg/dL.  Recent sCr 1.3-1.5 during admission for small bowel obstruction March 9-March 11.  Still gets IVF c Dr. Dao (cardiology) q 2 weeks. Dr. Dao is moving to [place], so pt may not be able to drive for this every two weeks. Next IVF is scheduled for 4/15/22.  Home BPs: labile at home. He takes Wendy if SBP gets as low as 110 and he takes his antihypertensives when SBP gets to 130-140. He took his antihypertensive medication while in the office today.    Update 4/11/22:  Returns for f/u of CKD.  Baseline sCr 1.7-1.8 mg/dL.  After appt on 4/4, pt's sCr returned at 3.0. Sent to ED. He was given IVF and had ileoscopy. sCr returned to previous baseline prior to discharge on 4/6.    Had IVF infusion on Friday 4/8/22 c cardiology. Had "bad" diarrhea on Saturday morning. Took immodium. He also vomited x 3. He became very weak and could not get off the bathroom floor, where he was sitting to be near toilet. Eventually he regained strength and was able to get up. He did not fall.  He has been drinking plenty of fluid and electrolyte fluids since that time. Stool output has returned to normal. No more vomiting. Says he feels "great" now.    Said he did a tilt table test years ago (before blood pressure issues), and it was normal at that " "time. Says drifts to the side when he walks. Says he gets dizzy if he stands up too fast.    Update 7/11/22:  Returns for f/u of CKD.  Was in ER with hypotension (SBPs 70s) 2/2 diarrhea output. Says he starts to feel tired when SBP drops to 105 mmHg.  Taking metamucil daily to help make stool more firm. Takes immodium when having diarrhea episodes. Says he tried to see GI but was unable to get an appt.  Home BPs: labile 80s-160s/60-90s. Followed closely by Dr. Dao  (outside cariologist).  Recently increased droxidopa to 600 mg BID per Dr. Dao. Also requesting to have weekly IVF instead every other week.    Update 8/1/22:  Returns for f/u GRADY.  sCr 3.5 mg/dL recently.  SBP in 80s frequently. Now on Northera 600 mg 4x/day per cardiologist. Carvedilol reduced to 6.25 .  Says he has not had liquid stools.  Says his cardiologist wants him to have tilt table test.    Update 11/4/22:  Returns for f/u of CKD.  Since last visit, he had a Tilt table test with his cardiologist. It was positive for "significant orthostatic hypotension and probably neurocardiogenic syncope of the mixed type." Cardiologist increased midodrine yesterday to 5 mg (from 2.5 mg) due to SBP in 80s.  No recent labs.  Home BPs: SBP 80s since Tuesday  Had 1L NS this morning.    Update 3/10/23:  Returns for f/u of CKD and for hospital follow up.  Baseline appears to be 2.3-2.4 mg/dL. Has been in this range since June 2022.  Home BPs: BPs are still labile. It was 80/66 last night. He had to take midorone 10 mg x 2 (about 4 hours apart).  Said BP dropped prior to having diarrhea yesterday, but he has had significant diarrhea overnight too.    Admitted 2/9/23 - 2/16/23 and had cholecystectomy.  Seen in ER 2/24 c wound dehiscence.     He has switched cardiologists and has had most of his BP medications changed.    Takes [employer] and glucerna    Update 5/26/23:  Returns for f/u of CKD.  Baseline appears to be 2.3-2.4 mg/dL. Most recent sCr of 2.9 in ER " "c hypotension  IVF increased to twice a week again due to high output of ileostomy and multiple ER visits.  PCP started oxybutynin to help with output. He does not feel like he's seen a significant improvement in output.    Says his new cardiologist has him on midodrine and PRN hydralazine for SBP > 180. Says SBP has been in 80s for the past couple days.     Update 10/6/23:  Returns for f/u of CKD.  Baseline appears to be 2.3-2.4 mg/dL.  Had sCr of 3.7 on 8/7/23. It has improved.  Has been in emergency room multiple times c hypotension and hypertension.  Previously was instructed to hold AM midodrine dose prior to infusions.  Home BPs: 80s/40s - taking midodrine 25 mg when this happens    Now being followed for 4.7 cm aortic aneurysm.    Admitted with chest pain 9/27/23. Plans for outpatient ischemic workup.    Now on Toprol c PRN hydralazine (needing about once a day)  Has been referred by endocrinology by cardiology to r/o adrenal insufficiency.    Still getting 1 L NS twice a week.    Review of Systems   Fatigue  Cardiovascular:  No swelling to legs.   Gastrointestinal:  Diarrhea through ostomy slowing down  Genitourinary: Negative for difficulty urinating, dysuria, hematuria. Reports good urine output.          Objective:       Blood pressure (!) 168/108, pulse 77, height 6' 2" (1.88 m), weight 97 kg (213 lb 13.5 oz), SpO2 98 %.  Physical Exam    Constitutional:       General: He is not in acute distress.     Appearance: Normal appearance. He is well-developed. He is not ill-appearing or diaphoretic.   Cardiac: normal rate; no edema to BLE  Pulmonary:  NAD    Skin:     General: Skin is warm and dry.     Neurological:      Mental Status: He is alert and oriented to person, place, and time.   Psychiatric:         Mood and Affect: Mood normal.         Behavior: Behavior normal.         Thought Content: Thought content normal.         Judgment: Judgment normal.         Lab Results   Component Value Date    " CREATININE 2.5 (H) 09/28/2023    URICACID 4.8 09/20/2022     Prot/Creat Ratio, Urine   Date Value Ref Range Status   06/02/2023 Unable to calculate 0.00 - 0.20 Final   03/14/2023 0.10 0.00 - 0.20 Final   01/31/2023 0.17 0.00 - 0.20 Final     Lab Results   Component Value Date     09/28/2023    K 3.5 09/28/2023    CO2 19 (L) 09/28/2023     09/28/2023     Lab Results   Component Value Date    .6 (H) 01/30/2023    CALCIUM 8.7 09/28/2023    PHOS 3.9 09/28/2023     Lab Results   Component Value Date    HGB 12.2 (L) 09/28/2023    WBC 9.33 09/28/2023    HCT 39.0 (L) 09/28/2023      Lab Results   Component Value Date    HGBA1C 6.8 (H) 09/27/2023     09/28/2023    BUN 44 (H) 09/28/2023     Lab Results   Component Value Date    LDLCALC 92.2 09/27/2023         Outside labs 10/24/19  Crt: 1.5 mg/dL  Hgb: 15.2 g/dL  CO2: 20 mmol/L  K: 5.9 mmol/L  A1c: 7.1%    Assessment:       1. Chronic kidney disease, stage 4 (severe)    2. Acidosis    3. Anemia, unspecified type    4. Labile blood pressure    5. Dysautonomia    6. Type 2 diabetes mellitus with stage 4 chronic kidney disease, unspecified whether long term insulin use    7. Secondary hyperparathyroidism    8. Altered bowel elimination due to intestinal ostomy              Plan:     CKD stage 3B c eGFR 43-50 mL/min c superimposed GRADY-  CKD clinically 2/2 age-related nephron loss, HTN, previous NSAID use for gout, volume depletion episodes c high ostomy output. Labile sCr. Progressive, clinically from frequent AKIs 2/2 hypotensive episodes.     No longer using NSAIDs;  At risk for progression given frequent AKIs. On PPI.     Has been getting 1L NS twice weekly.  Uroscopy 8/1/22: a few granular casts.      UPCR Trace proteinuria on dipstick. Needs repeat UPCR, but it has been negative historically.  Lisinopril previously d/c to help decrease risk of frequent AKIs.     Acid-base Mild acidosis. Start sodium bicarb 650 mg daily.     Renal osteodystrophy PTH  mildly elevated. Ca okay at last check. On calcium and vitamin D3.   Anemia Hgb at goal for CKD.    DM Well-controlled.   Lipid Management On statin per PCP   ESRD Planning Deferred today.       HTN - Still labile. Cardiology adjusting medications. On metoprolol 25 succinate mg  - Has PRN hydralazine 10 mg TID if SBP is > 180  - Has midodrine 10 mg PRN TID; unclear if he is still taking florinef.    - Allow for permissive HTN due to high propensity for hypotensive episodes causing AKIs  - Recommend that SBP be maintained over 110  - aldosterone slightly elevated, possibly 2/2 volume losses through ostomy. Carvedilol may suppress renin (he was on carvedilol at time of renin/saundra ratio)..  - Normetanephrine elevation not impressive, especially given level of CKD  - Tilt table test showed significant orthostatic hypotension and probable neurocardiogenic syncope of mixed type. Will defer management of blood pressure to cardiologist with this diagnosis.    Diarrhea/ high output ostomy - per GI; contributing to hypovolemia, hypotension, and AKIs.      All questions patient had were answered.  Asked if further questions. None. F/u in 3 mos with labs and urine prior to next visit or sooner if needed.  ER for emergency concerns.    Summary of Plan:  F/u c neurology  See endocrinology  avoid NSAID/ bactrim/ IV contrast/ gadolinium/ aminoglycoside where possible  RTC in 3 mos c MD

## 2023-10-08 ENCOUNTER — TELEPHONE (OUTPATIENT)
Dept: PRIMARY CARE CLINIC | Facility: CLINIC | Age: 73
End: 2023-10-08
Payer: MEDICARE

## 2023-10-08 DIAGNOSIS — E11.22 TYPE 2 DIABETES MELLITUS WITH STAGE 3B CHRONIC KIDNEY DISEASE, WITHOUT LONG-TERM CURRENT USE OF INSULIN: ICD-10-CM

## 2023-10-08 DIAGNOSIS — N18.32 TYPE 2 DIABETES MELLITUS WITH STAGE 3B CHRONIC KIDNEY DISEASE, WITHOUT LONG-TERM CURRENT USE OF INSULIN: ICD-10-CM

## 2023-10-08 DIAGNOSIS — G63 POLYNEUROPATHY ASSOCIATED WITH UNDERLYING DISEASE: Primary | ICD-10-CM

## 2023-10-08 NOTE — PROGRESS NOTES
History & Physical    SUBJECTIVE:     History of Present Illness:  Patient is a 70 y.o. male presents with an abrasion to his forearm which has been bleeding for a few weeks.  Says every time he takes a dressing off and has some oozing from the skin.  He has some extra granulation tissue in thinning of the skin in the area.  I used silver nitrate to achieve hemostasis at the using areas.  Another dressing was placed.  This should help with the chronic bruising that the patient has been having and allow this wound to heal better.      Chief Complaint   Patient presents with    Wound Check       Review of patient's allergies indicates:  No Known Allergies    Current Outpatient Medications   Medication Sig Dispense Refill    ACCU-CHEK PAUL CONTROL SOLN Soln 1 drop by NOT APPLICABLE route once daily.      ACCU-CHEK SOFTCLIX LANCETS Misc 1 lancet by NOT APPLICABLE route once daily.      allopurinoL (ZYLOPRIM) 300 MG tablet Take 1.5 tablets (450 mg total) by mouth once daily. 135 tablet 3    amLODIPine (NORVASC) 2.5 MG tablet Take 2.5 tablets by mouth once daily.      aspirin (ECOTRIN) 81 MG EC tablet Take 81 mg by mouth once daily.        BD ALCOHOL SWABS PadM       blood sugar diagnostic (ONE TOUCH ULTRA TEST) Strp USE ONE STRIP TO CHECK GLUCOSE EVERY  each 11    brimonidine 0.2% (ALPHAGAN) 0.2 % Drop INSTILL 1 DROP INTO EACH EYE TWICE DAILY  4    carvediloL (COREG) 3.125 MG tablet TAKE 1 TABLET BY MOUTH IN THE MORNING AND TWO TABLETS BY MOUTH AT BEDTIME 90 tablet 3    carvediloL (COREG) 6.25 MG tablet       clindamycin (CLEOCIN) 300 MG capsule Take 1 capsule (300 mg total) by mouth every 6 (six) hours. 40 capsule 0    cloNIDine (CATAPRES) 0.1 MG tablet Take 0.5 mg by mouth once daily.      clopidogrel (PLAVIX) 75 mg tablet once daily.       ergocalciferol, vitamin D2, (VITAMIN D ORAL) Take by mouth.      fluorouraciL (TOLAK) 4 % Crea Apply 1 application topically once daily. for 21 days 40 g 3     gabapentin (NEURONTIN) 600 MG tablet Take 1 tablet (600 mg total) by mouth 2 (two) times daily. 180 tablet 3    glimepiride (AMARYL) 4 MG tablet Take 1 tablet (4 mg total) by mouth 2 (two) times daily with meals. 180 tablet 3    HYDROcodone-acetaminophen (NORCO) 5-325 mg per tablet Take 1 tablet by mouth every 12 (twelve) hours as needed. 20 tablet 0    indomethacin (INDOCIN) 50 MG capsule Take 1 capsule (50 mg total) by mouth 2 (two) times daily with meals. for 7 days 14 capsule 0    linaGLIPtin (TRADJENTA) 5 mg Tab tablet Take 1 tablet (5 mg total) by mouth once daily. 90 tablet 3    lisinopril 10 MG tablet       loperamide (IMODIUM A-D) 2 mg Tab Take 2 mg by mouth once daily.      LORazepam (ATIVAN) 0.5 MG tablet Take 0.5 mg by mouth every 6 (six) hours as needed for Anxiety.      omeprazole (PRILOSEC) 40 MG capsule Take 1 capsule (40 mg total) by mouth once daily. 90 capsule 3    pantoprazole (PROTONIX) 40 MG tablet        Current Facility-Administered Medications   Medication Dose Route Frequency Provider Last Rate Last Dose    leuprolide (6 month) injection 45 mg  45 mg Intramuscular Q6 Months Catarino Mota MD        leuprolide (6 month) injection 45 mg  45 mg Intramuscular Q6 Months Catarino Mota MD   45 mg at 05/19/20 1112       Past Medical History:   Diagnosis Date    Basal cell carcinoma     BPH (benign prostatic hyperplasia)     s/p TURP    Carotid stenosis     Chronic kidney disease     Claudication     Coronary artery disease     DDD (degenerative disc disease) 10/21/2013    Diabetes mellitus with renal complications     Disc disease, degenerative, cervical     Encounter for blood transfusion     GERD (gastroesophageal reflux disease)     Gout, chronic     History of ulcerative colitis     s/p colectomy and ileostomy    HLD (hyperlipidemia)     HTN (hypertension)     Ileostomy in place 1982    RBBB     Squamous cell carcinoma 03/08/2018    Left superior helix near  "insertion    Squamous cell carcinoma 04/12/2018    Left forearm x 5    Ventricular tachycardia      Past Surgical History:   Procedure Laterality Date    cardiac stents      CATARACT EXTRACTION Bilateral     CERVICAL FUSION      colectomy and ileostomy  1985    TRANSURETHRAL RESECTION OF PROSTATE (TURP) WITHOUT USE OF LASER N/A 1/23/2019    Procedure: TURP, WITHOUT USING LASER BIPOLAR;  Surgeon: Catarino Mota MD;  Location: [facility] OR [address] [place] [address];  Service: Urology;  Laterality: N/A;  1.5 HOURS     Family History   Problem Relation Age of Onset    Cancer Father     Diabetes Father     Dementia Mother     Melanoma Neg Hx     Hypertension Neg Hx     Arthritis Neg Hx      Social History     Tobacco Use    Smoking status: Never Smoker    Smokeless tobacco: Never Used   Substance Use Topics    Alcohol use: No    Drug use: No        Review of Systems:  Review of Systems   Constitutional: Negative for appetite change, fatigue, fever and unexpected weight change.   HENT: Negative for sore throat and trouble swallowing.    Eyes: Negative.    Respiratory: Negative for cough, shortness of breath and wheezing.    Cardiovascular: Negative for chest pain and leg swelling.   Gastrointestinal: Negative for abdominal distention, abdominal pain, blood in stool, constipation, diarrhea, nausea and vomiting.   Endocrine: Negative.    Genitourinary: Negative.    Musculoskeletal: Negative for back pain.   Skin: Negative.  Negative for rash.   Allergic/Immunologic: Negative.    Neurological: Negative.    Hematological: Negative.    Psychiatric/Behavioral: Negative for confusion.       OBJECTIVE:     Vital Signs (Most Recent)  Temp: 98.2 °F (36.8 °C) (08/07/20 0909)  Pulse: 74 (08/07/20 0909)  Resp: 16 (08/07/20 0909)  BP: 135/87(Patient reported not taking BP meds this AM yet) (08/07/20 0909)  SpO2: 97 % (08/07/20 0909)  6' 2" (1.88 m)  98 kg (216 lb 0.8 oz)     Physical Exam:  Physical Exam  Vitals signs and nursing note " reviewed.   Constitutional:       Appearance: He is well-developed.   HENT:      Head: Normocephalic and atraumatic.   Neck:      Musculoskeletal: Normal range of motion.   Cardiovascular:      Rate and Rhythm: Normal rate.      Heart sounds: Normal heart sounds.   Pulmonary:      Effort: Pulmonary effort is normal.   Abdominal:      General: Bowel sounds are normal. There is no distension.      Palpations: Abdomen is soft.      Tenderness: There is no abdominal tenderness.   Musculoskeletal: Normal range of motion.   Skin:     General: Skin is warm and dry.      Capillary Refill: Capillary refill takes less than 2 seconds.   Neurological:      Mental Status: He is alert and oriented to person, place, and time.   Psychiatric:         Behavior: Behavior normal.      Left forearm with a 8 x 10 cm area of abrasion.  Several small areas of oozing  Silver nitrate used to achieve hemostasis      Laboratory  CBC: Reviewed  CMP: Reviewed    ASSESSMENT/PLAN:     70-year-old male with a area of oozing from a laceration on his left forearm.      PLAN:Plan     Hemostasis achieved  Continue local wound care  Advised patient to use gauze that did not stick.          stretcher

## 2023-10-09 ENCOUNTER — TELEPHONE (OUTPATIENT)
Dept: PRIMARY CARE CLINIC | Facility: CLINIC | Age: 73
End: 2023-10-09
Payer: MEDICARE

## 2023-10-09 NOTE — TELEPHONE ENCOUNTER
Called patient to inform him that Dr. Gonzalez completed his paperwork and it will be up front ready for . Patient verbalized understanding.

## 2023-10-11 ENCOUNTER — TELEPHONE (OUTPATIENT)
Dept: NEPHROLOGY | Facility: CLINIC | Age: 73
End: 2023-10-11
Payer: MEDICARE

## 2023-10-11 ENCOUNTER — TELEPHONE (OUTPATIENT)
Dept: CARDIOLOGY | Facility: CLINIC | Age: 73
End: 2023-10-11
Payer: MEDICARE

## 2023-10-11 ENCOUNTER — TELEPHONE (OUTPATIENT)
Dept: PRIMARY CARE CLINIC | Facility: CLINIC | Age: 73
End: 2023-10-11
Payer: MEDICARE

## 2023-10-11 NOTE — TELEPHONE ENCOUNTER
----- Message from Bianka Arevalo NP sent at 10/11/2023  2:09 PM CDT -----  Please let him know I reviewed with Dr. Isabel. Unfortunately, we do not believe his labile blood pressures are from his kidneys. The tilt table test he did previously showed neurocardiogenic hypotension (basically his nervous system acts inappropriately and causes his low blood pressure and fainting). He needs to follow up with neurology. It looks like he has an appt 11/21.  Cardiology had also recommended being seen by endocrinology to see if they could identify other causes.  ----- Message -----  From: Gricel Watkins MA  Sent: 10/11/2023  10:21 AM CDT  To: Bianka Arevalo NP      ----- Message -----  From: Megan Toribio  Sent: 10/11/2023  10:05 AM CDT  To: Irina Bianka Staff    PEGGY JOHNSON calling regarding Patient Advice for wanting to inform the NP that he has been in the ED 4 times in the last 2 month and just got home today from yesterday, stated that his cardiologist suggested that it may be his kidney malfunctioning and causing the blood pressure to run low. PT wanted to see if there is something that she can do to maybe adjust his medications or order Labs to see what's going on, please call back to PT to inform  [phone]

## 2023-10-11 NOTE — TELEPHONE ENCOUNTER
"Spoke with patient, informed of provider message and recommendations.  Patient states he is taking fludrocortisone daily and midodrine three times a day, but went up to 25 mg because dose was not working.  Patient displeased, stated, "your no help".  I apologized to the patient prior to him hanging up.  "

## 2023-10-11 NOTE — TELEPHONE ENCOUNTER
----- Message from Pilar Bonner sent at 10/11/2023  9:23 AM CDT -----  Contact: [phone]  [email or web address]     Patient is calling for Medical Advice regarding:ED follow up appt     How long has patient had these symptoms:    Pharmacy name and phone#:    Would like response via [employer]:  no     Comments:pt is calling he states he is needing a follow up appt he has been in the hospital 2 times please give return call he states for low blood pressure

## 2023-10-11 NOTE — TELEPHONE ENCOUNTER
----- Message from Jorge A Boudreaux sent at 10/11/2023  9:16 AM CDT -----  Regarding: appt; sameday 10/11/2023  Contact: PT @ [phone]  Pt is calling to speak to someone in the office; asking to be seen today. No available appts in Epic. Pt is willing to see any provider that is available today. Please call soon. Thanks.    Patient's DX: f/u..blood pressure.. in and out the ER a few times..pt states that his symptoms are getting worse    Communication Preference: PT @ [phone]    Additional Information: Thanks.

## 2023-10-11 NOTE — TELEPHONE ENCOUNTER
Next appt 11/01/2023  LOV 08/01/2023    Patient states every morning BP is drops with dizziness, he is only able to get around in his house with a wheelchair.  He states he is taking midodrine 25 mg and its not working to bring up his pressure.  He states he stays hydrated and cannot afford to keep going to ER.    Patient last seen by nephrology 10/06/2023 and appointment with Endocrinology scheduled 11/02/2023.    Patient is asking for any other medication to help increase blood pressure when it is low?

## 2023-10-12 ENCOUNTER — TELEPHONE (OUTPATIENT)
Dept: CARDIOLOGY | Facility: HOSPITAL | Age: 73
End: 2023-10-12
Payer: MEDICARE

## 2023-10-13 ENCOUNTER — INFUSION (OUTPATIENT)
Dept: INFECTIOUS DISEASES | Facility: HOSPITAL | Age: 73
End: 2023-10-13
Payer: MEDICARE

## 2023-10-13 ENCOUNTER — PATIENT MESSAGE (OUTPATIENT)
Dept: NEPHROLOGY | Facility: CLINIC | Age: 73
End: 2023-10-13
Payer: MEDICARE

## 2023-10-13 ENCOUNTER — TELEPHONE (OUTPATIENT)
Dept: DIABETES | Facility: CLINIC | Age: 73
End: 2023-10-13

## 2023-10-13 VITALS
HEIGHT: 74 IN | HEART RATE: 80 BPM | BODY MASS INDEX: 27.5 KG/M2 | RESPIRATION RATE: 16 BRPM | TEMPERATURE: 98 F | WEIGHT: 214.31 LBS | DIASTOLIC BLOOD PRESSURE: 99 MMHG | SYSTOLIC BLOOD PRESSURE: 180 MMHG | OXYGEN SATURATION: 98 %

## 2023-10-13 DIAGNOSIS — E86.0 DEHYDRATION: Primary | ICD-10-CM

## 2023-10-13 DIAGNOSIS — E11.22 TYPE 2 DIABETES MELLITUS WITH STAGE 3B CHRONIC KIDNEY DISEASE, WITHOUT LONG-TERM CURRENT USE OF INSULIN: ICD-10-CM

## 2023-10-13 DIAGNOSIS — N18.32 TYPE 2 DIABETES MELLITUS WITH STAGE 3B CHRONIC KIDNEY DISEASE, WITHOUT LONG-TERM CURRENT USE OF INSULIN: ICD-10-CM

## 2023-10-13 DIAGNOSIS — Z93.3 COLOSTOMY PRESENT: ICD-10-CM

## 2023-10-13 PROCEDURE — 96360 HYDRATION IV INFUSION INIT: CPT | Mod: HCNC

## 2023-10-13 PROCEDURE — 25000003 PHARM REV CODE 250: Mod: HCNC | Performed by: NURSE PRACTITIONER

## 2023-10-13 RX ORDER — HEPARIN 100 UNIT/ML
500 SYRINGE INTRAVENOUS
Status: CANCELLED | OUTPATIENT
Start: 2023-10-17

## 2023-10-13 RX ORDER — SODIUM CHLORIDE 0.9 % (FLUSH) 0.9 %
10 SYRINGE (ML) INJECTION
Status: CANCELLED | OUTPATIENT
Start: 2023-10-17

## 2023-10-13 RX ADMIN — SODIUM CHLORIDE 1000 ML: 0.9 INJECTION, SOLUTION INTRAVENOUS at 08:10

## 2023-10-13 NOTE — TELEPHONE ENCOUNTER
Spoke to pt explained that provider only manages Diabetes,, and not any adrenal disorders, provided pt with main campus number to schedule with Endocrine, pt verbalized understanding

## 2023-10-13 NOTE — TELEPHONE ENCOUNTER
Prostatitis, unspecified prostatitis type  -     ciprofloxacin HCl (CIPRO) 500 MG tablet; Take 1 tablet (500 mg total) by mouth 2 (two) times daily. for 30 doses  Dispense: 30 tablet; Refill: 1    stop Macrobid.  Take cipor instead for 2 wks.   Sarecycline Pregnancy And Lactation Text: This medication is Pregnancy Category D and not consider safe during pregnancy. It is also excreted in breast milk.

## 2023-10-17 ENCOUNTER — INFUSION (OUTPATIENT)
Dept: INFECTIOUS DISEASES | Facility: HOSPITAL | Age: 73
End: 2023-10-17
Payer: MEDICARE

## 2023-10-17 VITALS
BODY MASS INDEX: 26.97 KG/M2 | SYSTOLIC BLOOD PRESSURE: 166 MMHG | RESPIRATION RATE: 18 BRPM | DIASTOLIC BLOOD PRESSURE: 87 MMHG | TEMPERATURE: 98 F | WEIGHT: 210.13 LBS | HEIGHT: 74 IN | OXYGEN SATURATION: 99 % | HEART RATE: 77 BPM

## 2023-10-17 DIAGNOSIS — N18.32 TYPE 2 DIABETES MELLITUS WITH STAGE 3B CHRONIC KIDNEY DISEASE, WITHOUT LONG-TERM CURRENT USE OF INSULIN: ICD-10-CM

## 2023-10-17 DIAGNOSIS — Z93.3 COLOSTOMY PRESENT: ICD-10-CM

## 2023-10-17 DIAGNOSIS — E11.22 TYPE 2 DIABETES MELLITUS WITH STAGE 3B CHRONIC KIDNEY DISEASE, WITHOUT LONG-TERM CURRENT USE OF INSULIN: ICD-10-CM

## 2023-10-17 DIAGNOSIS — E86.0 DEHYDRATION: Primary | ICD-10-CM

## 2023-10-17 PROCEDURE — 25000003 PHARM REV CODE 250: Mod: HCNC | Performed by: NURSE PRACTITIONER

## 2023-10-17 PROCEDURE — 96360 HYDRATION IV INFUSION INIT: CPT | Mod: HCNC

## 2023-10-17 RX ORDER — SODIUM CHLORIDE 0.9 % (FLUSH) 0.9 %
10 SYRINGE (ML) INJECTION
Status: DISCONTINUED | OUTPATIENT
Start: 2023-10-17 | End: 2023-10-17 | Stop reason: HOSPADM

## 2023-10-17 RX ORDER — HEPARIN 100 UNIT/ML
500 SYRINGE INTRAVENOUS
Status: DISCONTINUED | OUTPATIENT
Start: 2023-10-17 | End: 2023-10-17 | Stop reason: HOSPADM

## 2023-10-17 RX ORDER — SODIUM CHLORIDE 0.9 % (FLUSH) 0.9 %
10 SYRINGE (ML) INJECTION
Status: CANCELLED | OUTPATIENT
Start: 2023-10-20

## 2023-10-17 RX ORDER — HEPARIN 100 UNIT/ML
500 SYRINGE INTRAVENOUS
Status: CANCELLED | OUTPATIENT
Start: 2023-10-20

## 2023-10-17 RX ADMIN — SODIUM CHLORIDE 1000 ML: 9 INJECTION, SOLUTION INTRAVENOUS at 08:10

## 2023-10-18 ENCOUNTER — PATIENT OUTREACH (OUTPATIENT)
Dept: ADMINISTRATIVE | Facility: HOSPITAL | Age: 73
End: 2023-10-18
Payer: MEDICARE

## 2023-10-18 ENCOUNTER — PATIENT MESSAGE (OUTPATIENT)
Dept: ADMINISTRATIVE | Facility: HOSPITAL | Age: 73
End: 2023-10-18
Payer: MEDICARE

## 2023-10-18 ENCOUNTER — PATIENT MESSAGE (OUTPATIENT)
Dept: CARDIOLOGY | Facility: CLINIC | Age: 73
End: 2023-10-18
Payer: MEDICARE

## 2023-10-18 NOTE — PROGRESS NOTES
Health Maintenance Due   Topic Date Due    Eye Exam  09/24/2021    COVID-19 Vaccine (6 - 2023-24 season) 09/01/2023     Immunizations - reviewed and updated   Care Everywhere - triggered   Care Teams - updated   Outreach - Patient overdue for diabetic eye exam, portal message sent to patient.

## 2023-10-20 ENCOUNTER — INFUSION (OUTPATIENT)
Dept: INFECTIOUS DISEASES | Facility: HOSPITAL | Age: 73
End: 2023-10-20
Payer: MEDICARE

## 2023-10-20 VITALS
TEMPERATURE: 99 F | WEIGHT: 212.75 LBS | SYSTOLIC BLOOD PRESSURE: 188 MMHG | HEIGHT: 74 IN | OXYGEN SATURATION: 98 % | BODY MASS INDEX: 27.3 KG/M2 | RESPIRATION RATE: 16 BRPM | DIASTOLIC BLOOD PRESSURE: 80 MMHG

## 2023-10-20 DIAGNOSIS — Z93.3 COLOSTOMY PRESENT: ICD-10-CM

## 2023-10-20 DIAGNOSIS — E11.22 TYPE 2 DIABETES MELLITUS WITH STAGE 3B CHRONIC KIDNEY DISEASE, WITHOUT LONG-TERM CURRENT USE OF INSULIN: ICD-10-CM

## 2023-10-20 DIAGNOSIS — E86.0 DEHYDRATION: Primary | ICD-10-CM

## 2023-10-20 DIAGNOSIS — N18.32 TYPE 2 DIABETES MELLITUS WITH STAGE 3B CHRONIC KIDNEY DISEASE, WITHOUT LONG-TERM CURRENT USE OF INSULIN: ICD-10-CM

## 2023-10-20 PROCEDURE — 25000003 PHARM REV CODE 250: Mod: HCNC | Performed by: NURSE PRACTITIONER

## 2023-10-20 PROCEDURE — 96360 HYDRATION IV INFUSION INIT: CPT | Mod: HCNC

## 2023-10-20 RX ORDER — SODIUM CHLORIDE 0.9 % (FLUSH) 0.9 %
10 SYRINGE (ML) INJECTION
Status: CANCELLED | OUTPATIENT
Start: 2023-10-24

## 2023-10-20 RX ORDER — HEPARIN 100 UNIT/ML
500 SYRINGE INTRAVENOUS
Status: CANCELLED | OUTPATIENT
Start: 2023-10-24

## 2023-10-20 RX ADMIN — SODIUM CHLORIDE 1000 ML: 9 INJECTION, SOLUTION INTRAVENOUS at 09:10

## 2023-10-20 NOTE — PROGRESS NOTES
Pt currently receiving 1L NS bolus over 1 hr., tolerating well. Return appointment provided.     Limited head-to-toe assessment due to privacy issues and visit reason though the opportunity was given for patient to express any concerns.

## 2023-10-26 ENCOUNTER — OFFICE VISIT (OUTPATIENT)
Dept: PRIMARY CARE CLINIC | Facility: CLINIC | Age: 73
End: 2023-10-26
Payer: MEDICARE

## 2023-10-26 VITALS
DIASTOLIC BLOOD PRESSURE: 88 MMHG | HEART RATE: 86 BPM | HEIGHT: 74 IN | OXYGEN SATURATION: 96 % | WEIGHT: 212.19 LBS | RESPIRATION RATE: 18 BRPM | SYSTOLIC BLOOD PRESSURE: 146 MMHG | BODY MASS INDEX: 27.23 KG/M2

## 2023-10-26 DIAGNOSIS — M25.511 ACUTE PAIN OF RIGHT SHOULDER: Primary | ICD-10-CM

## 2023-10-26 DIAGNOSIS — M10.9 GOUTY ARTHRITIS OF RIGHT KNEE: ICD-10-CM

## 2023-10-26 DIAGNOSIS — N18.32 TYPE 2 DIABETES MELLITUS WITH STAGE 3B CHRONIC KIDNEY DISEASE, WITHOUT LONG-TERM CURRENT USE OF INSULIN: ICD-10-CM

## 2023-10-26 DIAGNOSIS — E11.22 TYPE 2 DIABETES MELLITUS WITH STAGE 3B CHRONIC KIDNEY DISEASE, WITHOUT LONG-TERM CURRENT USE OF INSULIN: ICD-10-CM

## 2023-10-26 DIAGNOSIS — N18.32 STAGE 3B CHRONIC KIDNEY DISEASE: ICD-10-CM

## 2023-10-26 DIAGNOSIS — E78.5 HYPERLIPIDEMIA, UNSPECIFIED HYPERLIPIDEMIA TYPE: ICD-10-CM

## 2023-10-26 DIAGNOSIS — R19.8 HIGH OUTPUT ILEOSTOMY: ICD-10-CM

## 2023-10-26 DIAGNOSIS — I10 HYPERTENSION, UNSPECIFIED TYPE: ICD-10-CM

## 2023-10-26 DIAGNOSIS — Z93.2 HIGH OUTPUT ILEOSTOMY: ICD-10-CM

## 2023-10-26 DIAGNOSIS — I10 ESSENTIAL HYPERTENSION: ICD-10-CM

## 2023-10-26 DIAGNOSIS — E83.42 HYPOMAGNESEMIA: ICD-10-CM

## 2023-10-26 DIAGNOSIS — I99.8 BLOOD PRESSURE INSTABILITY: ICD-10-CM

## 2023-10-26 DIAGNOSIS — N18.32 CHRONIC RENAL IMPAIRMENT, STAGE 3B: ICD-10-CM

## 2023-10-26 PROCEDURE — 1160F PR REVIEW ALL MEDS BY PRESCRIBER/CLIN PHARMACIST DOCUMENTED: ICD-10-PCS | Mod: HCNC,CPTII,S$GLB, | Performed by: INTERNAL MEDICINE

## 2023-10-26 PROCEDURE — 1125F PR PAIN SEVERITY QUANTIFIED, PAIN PRESENT: ICD-10-PCS | Mod: HCNC,CPTII,S$GLB, | Performed by: INTERNAL MEDICINE

## 2023-10-26 PROCEDURE — 3008F PR BODY MASS INDEX (BMI) DOCUMENTED: ICD-10-PCS | Mod: HCNC,CPTII,S$GLB, | Performed by: INTERNAL MEDICINE

## 2023-10-26 PROCEDURE — 3044F HG A1C LEVEL LT 7.0%: CPT | Mod: HCNC,CPTII,S$GLB, | Performed by: INTERNAL MEDICINE

## 2023-10-26 PROCEDURE — 96372 PR INJECTION,THERAP/PROPH/DIAG2ST, IM OR SUBCUT: ICD-10-PCS | Mod: HCNC,S$GLB,, | Performed by: INTERNAL MEDICINE

## 2023-10-26 PROCEDURE — 3079F PR MOST RECENT DIASTOLIC BLOOD PRESSURE 80-89 MM HG: ICD-10-PCS | Mod: HCNC,CPTII,S$GLB, | Performed by: INTERNAL MEDICINE

## 2023-10-26 PROCEDURE — 3066F PR DOCUMENTATION OF TREATMENT FOR NEPHROPATHY: ICD-10-PCS | Mod: HCNC,CPTII,S$GLB, | Performed by: INTERNAL MEDICINE

## 2023-10-26 PROCEDURE — 3288F PR FALLS RISK ASSESSMENT DOCUMENTED: ICD-10-PCS | Mod: HCNC,CPTII,S$GLB, | Performed by: INTERNAL MEDICINE

## 2023-10-26 PROCEDURE — 99214 OFFICE O/P EST MOD 30 MIN: CPT | Mod: HCNC,25,S$GLB, | Performed by: INTERNAL MEDICINE

## 2023-10-26 PROCEDURE — 3077F SYST BP >= 140 MM HG: CPT | Mod: HCNC,CPTII,S$GLB, | Performed by: INTERNAL MEDICINE

## 2023-10-26 PROCEDURE — 3077F PR MOST RECENT SYSTOLIC BLOOD PRESSURE >= 140 MM HG: ICD-10-PCS | Mod: HCNC,CPTII,S$GLB, | Performed by: INTERNAL MEDICINE

## 2023-10-26 PROCEDURE — 1159F PR MEDICATION LIST DOCUMENTED IN MEDICAL RECORD: ICD-10-PCS | Mod: HCNC,CPTII,S$GLB, | Performed by: INTERNAL MEDICINE

## 2023-10-26 PROCEDURE — 3066F NEPHROPATHY DOC TX: CPT | Mod: HCNC,CPTII,S$GLB, | Performed by: INTERNAL MEDICINE

## 2023-10-26 PROCEDURE — 99999 PR PBB SHADOW E&M-EST. PATIENT-LVL V: ICD-10-PCS | Mod: PBBFAC,HCNC,, | Performed by: INTERNAL MEDICINE

## 2023-10-26 PROCEDURE — 3079F DIAST BP 80-89 MM HG: CPT | Mod: HCNC,CPTII,S$GLB, | Performed by: INTERNAL MEDICINE

## 2023-10-26 PROCEDURE — 1100F PTFALLS ASSESS-DOCD GE2>/YR: CPT | Mod: HCNC,CPTII,S$GLB, | Performed by: INTERNAL MEDICINE

## 2023-10-26 PROCEDURE — 3008F BODY MASS INDEX DOCD: CPT | Mod: HCNC,CPTII,S$GLB, | Performed by: INTERNAL MEDICINE

## 2023-10-26 PROCEDURE — 3060F POS MICROALBUMINURIA REV: CPT | Mod: HCNC,CPTII,S$GLB, | Performed by: INTERNAL MEDICINE

## 2023-10-26 PROCEDURE — 99214 PR OFFICE/OUTPT VISIT, EST, LEVL IV, 30-39 MIN: ICD-10-PCS | Mod: HCNC,25,S$GLB, | Performed by: INTERNAL MEDICINE

## 2023-10-26 PROCEDURE — 96372 THER/PROPH/DIAG INJ SC/IM: CPT | Mod: HCNC,S$GLB,, | Performed by: INTERNAL MEDICINE

## 2023-10-26 PROCEDURE — 1160F RVW MEDS BY RX/DR IN RCRD: CPT | Mod: HCNC,CPTII,S$GLB, | Performed by: INTERNAL MEDICINE

## 2023-10-26 PROCEDURE — 1159F MED LIST DOCD IN RCRD: CPT | Mod: HCNC,CPTII,S$GLB, | Performed by: INTERNAL MEDICINE

## 2023-10-26 PROCEDURE — 1100F PR PT FALLS ASSESS DOC 2+ FALLS/FALL W/INJURY/YR: ICD-10-PCS | Mod: HCNC,CPTII,S$GLB, | Performed by: INTERNAL MEDICINE

## 2023-10-26 PROCEDURE — 3288F FALL RISK ASSESSMENT DOCD: CPT | Mod: HCNC,CPTII,S$GLB, | Performed by: INTERNAL MEDICINE

## 2023-10-26 PROCEDURE — 99999 PR PBB SHADOW E&M-EST. PATIENT-LVL V: CPT | Mod: PBBFAC,HCNC,, | Performed by: INTERNAL MEDICINE

## 2023-10-26 PROCEDURE — 3044F PR MOST RECENT HEMOGLOBIN A1C LEVEL <7.0%: ICD-10-PCS | Mod: HCNC,CPTII,S$GLB, | Performed by: INTERNAL MEDICINE

## 2023-10-26 PROCEDURE — 3060F PR POS MICROALBUMINURIA RESULT DOCUMENTED/REVIEW: ICD-10-PCS | Mod: HCNC,CPTII,S$GLB, | Performed by: INTERNAL MEDICINE

## 2023-10-26 PROCEDURE — 1125F AMNT PAIN NOTED PAIN PRSNT: CPT | Mod: HCNC,CPTII,S$GLB, | Performed by: INTERNAL MEDICINE

## 2023-10-26 RX ORDER — OXYCODONE HYDROCHLORIDE 5 MG/1
5 TABLET ORAL EVERY 8 HOURS PRN
Qty: 20 TABLET | Refills: 0 | Status: SHIPPED | OUTPATIENT
Start: 2023-10-26 | End: 2023-10-26 | Stop reason: SDUPTHER

## 2023-10-26 RX ORDER — OXYCODONE HYDROCHLORIDE 5 MG/1
5 TABLET ORAL EVERY 8 HOURS PRN
Qty: 20 TABLET | Refills: 0 | Status: ON HOLD | OUTPATIENT
Start: 2023-10-26 | End: 2023-11-28 | Stop reason: HOSPADM

## 2023-10-26 RX ORDER — CAPSAICIN 0 G/G
CREAM TOPICAL
Qty: 56.6 G | Refills: 1 | Status: SHIPPED | OUTPATIENT
Start: 2023-10-26 | End: 2023-10-26 | Stop reason: SDUPTHER

## 2023-10-26 RX ORDER — CAPSAICIN 0 G/G
CREAM TOPICAL
Qty: 56.6 G | Refills: 1 | Status: SHIPPED | OUTPATIENT
Start: 2023-10-26

## 2023-10-26 RX ORDER — TRIAMCINOLONE ACETONIDE 40 MG/ML
60 INJECTION, SUSPENSION INTRA-ARTICULAR; INTRAMUSCULAR ONCE
Status: COMPLETED | OUTPATIENT
Start: 2023-10-26 | End: 2023-10-26

## 2023-10-26 RX ADMIN — TRIAMCINOLONE ACETONIDE 60 MG: 40 INJECTION, SUSPENSION INTRA-ARTICULAR; INTRAMUSCULAR at 10:10

## 2023-10-26 NOTE — PROGRESS NOTES
Verified pt ID using name and . Brittney GUZMÁN Administered Kenalog 60 in left dorsalgluteal per physician order using aseptic technique. Aspirated and no blood return noted. Pt tolerated well with no adverse reactions noted.

## 2023-10-26 NOTE — PROGRESS NOTES
Subjective:       Patient ID: Jarrett Charlton Jr. is a 73 y.o. male.    Chief Complaint: Follow-up (ER)    HPI  Pt with multiple medical conditions CRI worsening chronic dehydration from high output ileostomy DM gouty arthritis HTN anxiety depression pt was seen 3 days ago in ER for dehydration had 2 litters of lactate ringer and sent home today pt c/o rt shoulder pain x 4 days getting worse no trauma no fever chill sob cp no other joint pain pt request injection his DM well controlled   Review of Systems   Constitutional:  Negative for unexpected weight change.   Respiratory:  Negative for shortness of breath.    Cardiovascular:  Negative for chest pain.   Gastrointestinal:  Negative for abdominal pain.   Musculoskeletal:  Positive for arthralgias.   Psychiatric/Behavioral:  Positive for dysphoric mood.        Objective:      Physical Exam  Vitals and nursing note reviewed.   Constitutional:       General: He is not in acute distress.     Appearance: He is well-developed.   HENT:      Head: Normocephalic and atraumatic.      Right Ear: External ear normal.      Left Ear: External ear normal.      Nose: Nose normal.      Mouth/Throat:      Pharynx: No oropharyngeal exudate.   Eyes:      Extraocular Movements: Extraocular movements intact.      Conjunctiva/sclera: Conjunctivae normal.      Pupils: Pupils are equal, round, and reactive to light.   Neck:      Thyroid: No thyromegaly.   Cardiovascular:      Rate and Rhythm: Normal rate and regular rhythm.      Heart sounds: Normal heart sounds. No murmur heard.     No friction rub. No gallop.   Pulmonary:      Effort: Pulmonary effort is normal. No respiratory distress.      Breath sounds: Normal breath sounds. No wheezing.   Abdominal:      General: Bowel sounds are normal. There is no distension.      Palpations: Abdomen is soft.      Tenderness: There is no abdominal tenderness.   Musculoskeletal:         General: Tenderness (rt shoulder with tenderness around  shoulder joint increase with passive ROM in all direction no swelling no erythema) present. No deformity. Normal range of motion.      Cervical back: Normal range of motion and neck supple.   Lymphadenopathy:      Cervical: No cervical adenopathy.   Skin:     General: Skin is warm and dry.      Capillary Refill: Capillary refill takes less than 2 seconds.      Findings: No erythema or rash.   Neurological:      Mental Status: He is alert and oriented to person, place, and time.   Psychiatric:         Thought Content: Thought content normal.         Judgment: Judgment normal.         Assessment:       1. Acute pain of right shoulder    2. Essential hypertension    3. Hypomagnesemia    4. Hypertension, unspecified type    5. Chronic renal impairment, stage 3b    6. Gouty arthritis of right knee    7. High output ileostomy    8. Type 2 diabetes mellitus with stage 3b chronic kidney disease, without long-term current use of insulin    9. Blood pressure instability    10. Stage 3b chronic kidney disease    11. Hyperlipidemia, unspecified hyperlipidemia type        Plan:       Acute pain of right shoulder  Comments:  will treat as gout DM well controlled try IM kenalog and po pain medication consider colchicine if not bettter  Orders:  -     triamcinolone acetonide injection 60 mg  -     Discontinue: capsaicin 0.1 % Crea; Apply painful joint TID  Dispense: 56.6 g; Refill: 1  -     CBC Auto Differential; Future; Expected date: 10/26/2023  -     Comprehensive Metabolic Panel; Future; Expected date: 10/26/2023  -     capsaicin 0.1 % Crea; Apply painful joint TID  Dispense: 56.6 g; Refill: 1    Essential hypertension  -     Aldosterone; Future; Expected date: 10/26/2023  -     Hypertension Digital Medicine (HDMP) Enrollment Order    Hypomagnesemia  -     Ambulatory referral/consult to Endocrinology; Future; Expected date: 11/02/2023    Hypertension, unspecified type    Chronic renal impairment, stage 3b  Comments:  worsening  from dehydration avoid all NSAIDS  Orders:  -     Ambulatory referral/consult to Nephrology; Future; Expected date: 10/31/2023    Gouty arthritis of right knee  -     Discontinue: oxyCODONE (ROXICODONE) 5 MG immediate release tablet; Take 1 tablet (5 mg total) by mouth every 8 (eight) hours as needed for Pain.  Dispense: 20 tablet; Refill: 0  -     oxyCODONE (ROXICODONE) 5 MG immediate release tablet; Take 1 tablet (5 mg total) by mouth every 8 (eight) hours as needed for Pain.  Dispense: 20 tablet; Refill: 0    High output ileostomy    Type 2 diabetes mellitus with stage 3b chronic kidney disease, without long-term current use of insulin  -     Hemoglobin A1C; Future; Expected date: 10/26/2023  -     TSH; Future; Expected date: 10/26/2023  -     T4, Free; Future; Expected date: 10/26/2023  -     Ambulatory referral/consult to Endocrinology; Future; Expected date: 11/02/2023  -     Diabetes Digital Medicine (DDMP) Enrollment Order    Blood pressure instability  -     Testosterone; Future; Expected date: 10/26/2023    Stage 3b chronic kidney disease  Comments:  need monitor closely and f/u with nephrology    Hyperlipidemia, unspecified hyperlipidemia type  -     Lipids Digital Medicine (LDMP) Enrollment Order        Medication List with Changes/Refills   New Medications    CAPSAICIN 0.1 % CREA    Apply painful joint TID   Current Medications    ACCU-CHEK PAUL CONTROL SOLN SOLN    1 drop by NOT APPLICABLE route once daily.    ACCU-CHEK SOFTCLIX LANCETS [facility] [place]    TEST BLOOD SUGAR THREE TIMES DAILY    ALLOPURINOL (ZYLOPRIM) 300 MG TABLET    Take 1.5 tablets (450 mg total) by mouth once daily.    ASPIRIN (ECOTRIN) 81 MG EC TABLET    Take 1 tablet (81 mg total) by mouth once daily.    BLOOD SUGAR DIAGNOSTIC (ONETOUCH ULTRA TEST) STRP    USE ONE STRIP TO CHECK GLUCOSE EVERY DAY    BLOOD-GLUCOSE METER (ACCU-CHEK GUIDE GLUCOSE METER) [facility] [place]    Accucheck BID    CALCIUM-VITAMIN D3 (OS-DAKOTA 500 + D3) 500 MG-5 MCG (200 UNIT) PER  TABLET    Take 1 tablet by mouth once daily.    CHOLESTYRAMINE-ASPARTAME (QUESTRAN LIGHT) 4 GRAM PWPK    Take 1 packet (4 g total) by mouth 3 (three) times daily with meals.    DROPSAFE ALCOHOL PREP PADS PADM    USE TOPICALLY 5 (FIVE) TIMES DAILY.    FLUDROCORTISONE (FLORINEF) 0.1 MG TAB    Take 1 tablet (100 mcg total) by mouth once daily.    GABAPENTIN (NEURONTIN) 100 MG CAPSULE    Take 2 capsules (200 mg total) by mouth 3 (three) times daily.    GLIMEPIRIDE (AMARYL) 4 MG TABLET    TAKE 1 TABLET (4 MG TOTAL) BY MOUTH 2 (TWO) TIMES DAILY BEFORE MEALS.    HYDRALAZINE (APRESOLINE) 25 MG TABLET    TAKE 1 TABLET THREE TIMES DAILY AS NEEDED FOR HIGH BLOOD PRESSURE: SYSTOLIC ABOVE 180 OR DIASTOLIC ABOVE 100.    LIDOCAINE (LIDODERM) 5 %    Place 1 patch onto the skin as needed. Remove & Discard patch within 12 hours or as directed by MD    MAGNESIUM OXIDE (MAG-OX) 400 MG (241.3 MG MAGNESIUM) TABLET    Take 1 tablet (400 mg total) by mouth once daily.    METOPROLOL SUCCINATE (TOPROL-XL) 25 MG 24 HR TABLET    Take 1 tablet (25 mg total) by mouth once daily.    MIDODRINE (PROAMATINE) 10 MG TABLET    Take 1 tablet (10 mg total) by mouth daily as needed (Hypotension).    PANTOPRAZOLE (PROTONIX) 40 MG TABLET    Take 1 tablet (40 mg total) by mouth once daily.    PRAVASTATIN (PRAVACHOL) 40 MG TABLET    TAKE 1 TABLET ONE TIME DAILY    PREDNISONE (DELTASONE) 20 MG TABLET    Take 1 tablet (20 mg total) by mouth 2 (two) times daily.    SODIUM BICARBONATE 650 MG TABLET    Take 1 tablet (650 mg total) by mouth once daily.   Changed and/or Refilled Medications    Modified Medication Previous Medication    OXYCODONE (ROXICODONE) 5 MG IMMEDIATE RELEASE TABLET oxyCODONE (ROXICODONE) 5 MG immediate release tablet       Take 1 tablet (5 mg total) by mouth every 8 (eight) hours as needed for Pain.    Take 1 tablet (5 mg total) by mouth every 8 (eight) hours as needed for Pain.

## 2023-10-27 ENCOUNTER — INFUSION (OUTPATIENT)
Dept: INFECTIOUS DISEASES | Facility: HOSPITAL | Age: 73
End: 2023-10-27
Payer: MEDICARE

## 2023-10-27 VITALS
WEIGHT: 213.75 LBS | SYSTOLIC BLOOD PRESSURE: 144 MMHG | DIASTOLIC BLOOD PRESSURE: 98 MMHG | RESPIRATION RATE: 18 BRPM | TEMPERATURE: 99 F | OXYGEN SATURATION: 98 % | HEART RATE: 78 BPM | BODY MASS INDEX: 27.43 KG/M2 | HEIGHT: 74 IN

## 2023-10-27 DIAGNOSIS — E11.22 TYPE 2 DIABETES MELLITUS WITH STAGE 3B CHRONIC KIDNEY DISEASE, WITHOUT LONG-TERM CURRENT USE OF INSULIN: ICD-10-CM

## 2023-10-27 DIAGNOSIS — Z93.3 COLOSTOMY PRESENT: ICD-10-CM

## 2023-10-27 DIAGNOSIS — E86.0 DEHYDRATION: Primary | ICD-10-CM

## 2023-10-27 DIAGNOSIS — N18.32 TYPE 2 DIABETES MELLITUS WITH STAGE 3B CHRONIC KIDNEY DISEASE, WITHOUT LONG-TERM CURRENT USE OF INSULIN: ICD-10-CM

## 2023-10-27 PROCEDURE — 25000003 PHARM REV CODE 250: Mod: HCNC | Performed by: NURSE PRACTITIONER

## 2023-10-27 PROCEDURE — 96360 HYDRATION IV INFUSION INIT: CPT | Mod: HCNC

## 2023-10-27 RX ORDER — SODIUM CHLORIDE 0.9 % (FLUSH) 0.9 %
10 SYRINGE (ML) INJECTION
Status: CANCELLED | OUTPATIENT
Start: 2023-10-31

## 2023-10-27 RX ORDER — SODIUM CHLORIDE 0.9 % (FLUSH) 0.9 %
10 SYRINGE (ML) INJECTION
Status: DISCONTINUED | OUTPATIENT
Start: 2023-10-27 | End: 2023-10-27 | Stop reason: HOSPADM

## 2023-10-27 RX ORDER — HEPARIN 100 UNIT/ML
500 SYRINGE INTRAVENOUS
Status: CANCELLED | OUTPATIENT
Start: 2023-10-31

## 2023-10-27 RX ORDER — HEPARIN 100 UNIT/ML
500 SYRINGE INTRAVENOUS
Status: DISCONTINUED | OUTPATIENT
Start: 2023-10-27 | End: 2023-10-27 | Stop reason: HOSPADM

## 2023-10-27 RX ADMIN — SODIUM CHLORIDE 1000 ML: 9 INJECTION, SOLUTION INTRAVENOUS at 08:10

## 2023-10-27 NOTE — PLAN OF CARE
Patient counseled on fall risk assessment and prevention at home and in public - verbalized understanding     - - -

## 2023-10-29 DIAGNOSIS — N17.9 AKI (ACUTE KIDNEY INJURY): Primary | ICD-10-CM

## 2023-10-30 ENCOUNTER — TELEPHONE (OUTPATIENT)
Dept: NEPHROLOGY | Facility: CLINIC | Age: 73
End: 2023-10-30
Payer: MEDICARE

## 2023-10-30 ENCOUNTER — TELEPHONE (OUTPATIENT)
Dept: PRIMARY CARE CLINIC | Facility: CLINIC | Age: 73
End: 2023-10-30
Payer: MEDICARE

## 2023-10-30 DIAGNOSIS — E83.42 HYPOMAGNESEMIA: Primary | ICD-10-CM

## 2023-10-30 DIAGNOSIS — N18.4 CHRONIC KIDNEY DISEASE, STAGE 4 (SEVERE): Primary | ICD-10-CM

## 2023-10-30 RX ORDER — COLCHICINE 0.6 MG/1
0.6 TABLET ORAL DAILY
Qty: 10 TABLET | Refills: 0 | Status: ON HOLD | OUTPATIENT
Start: 2023-10-30 | End: 2023-11-27 | Stop reason: SDUPTHER

## 2023-10-30 NOTE — TELEPHONE ENCOUNTER
----- Message from Bianka Arevalo NP sent at 10/30/2023  3:59 PM CDT -----  He has not needed the typical electrolytes we check. He gets sodium in the normal saline. Potassium is usually normal.     I don't typically check the magnesium. It looks like it was low in the ER, but not terrible.      We can check it in combination with a urine magnesium and urine creatinine. I think he is probably losing the magnesium from his stool, not from his urine. The urine test will confirm it. If is losses from his stool, he will need to f/u c his GI doctor about it.    I'll enter orders.   Thanks.      ----- Message -----  From: Gricel Watkins MA  Sent: 10/30/2023   3:51 PM CDT  To: Bianka Arevalo NP    Diarrhea yesterday   BP has been around 134/89  No new Rx,Taking oxycodone for neck stiffness  Doesn't do virtual appointments  Wants to know why he's only getting saline instead of electrolytes   ----- Message -----  From: Bianka Arevalo NP  Sent: 10/30/2023   3:30 PM CDT  To: Gricel Watkins MA    Pls call pt.   Ask if he has been having diarrhea/ high ostomy output and what BPs have been.    Ask if he has any new medications/ anything new over-the-counter.    Continue to hydrate well. Repeat labs later this week (RFP, CBC; I'll enter orders).     It does not appear that we have any slots available, but we can try for a virtual appt; especially if no improvement in labs.    Bianka Miller      ----- Message -----  From: Gricel Watkins MA  Sent: 10/30/2023   8:50 AM CDT  To: Bianka Arevalo NP      ----- Message -----  From: Kelly Sorto  Sent: 10/30/2023   8:30 AM CDT  To: Irina Carlton Staff    Chronic renal impairment, stage 3b [N18.32] Dr Gonzalez has a Urgent referral in for patient to see Dr Arevalo please call patient back to schedule appointment

## 2023-10-31 ENCOUNTER — INFUSION (OUTPATIENT)
Dept: INFECTIOUS DISEASES | Facility: HOSPITAL | Age: 73
End: 2023-10-31
Payer: MEDICARE

## 2023-10-31 VITALS
WEIGHT: 216.19 LBS | HEART RATE: 64 BPM | SYSTOLIC BLOOD PRESSURE: 186 MMHG | OXYGEN SATURATION: 97 % | DIASTOLIC BLOOD PRESSURE: 96 MMHG | TEMPERATURE: 98 F | RESPIRATION RATE: 18 BRPM | HEIGHT: 74 IN | BODY MASS INDEX: 27.74 KG/M2

## 2023-10-31 DIAGNOSIS — Z93.3 COLOSTOMY PRESENT: ICD-10-CM

## 2023-10-31 DIAGNOSIS — N18.32 TYPE 2 DIABETES MELLITUS WITH STAGE 3B CHRONIC KIDNEY DISEASE, WITHOUT LONG-TERM CURRENT USE OF INSULIN: ICD-10-CM

## 2023-10-31 DIAGNOSIS — E11.22 TYPE 2 DIABETES MELLITUS WITH STAGE 3B CHRONIC KIDNEY DISEASE, WITHOUT LONG-TERM CURRENT USE OF INSULIN: ICD-10-CM

## 2023-10-31 DIAGNOSIS — E86.0 DEHYDRATION: Primary | ICD-10-CM

## 2023-10-31 PROCEDURE — 25000003 PHARM REV CODE 250: Mod: HCNC | Performed by: NURSE PRACTITIONER

## 2023-10-31 PROCEDURE — 96360 HYDRATION IV INFUSION INIT: CPT | Mod: HCNC

## 2023-10-31 RX ORDER — HEPARIN 100 UNIT/ML
500 SYRINGE INTRAVENOUS
Status: CANCELLED | OUTPATIENT
Start: 2023-11-03

## 2023-10-31 RX ORDER — SODIUM CHLORIDE 0.9 % (FLUSH) 0.9 %
10 SYRINGE (ML) INJECTION
Status: CANCELLED | OUTPATIENT
Start: 2023-11-03

## 2023-10-31 RX ADMIN — SODIUM CHLORIDE 1000 ML: 9 INJECTION, SOLUTION INTRAVENOUS at 08:10

## 2023-10-31 NOTE — PLAN OF CARE
Patient counseled on infection prevention by excellent hand hygiene - verbalized understanding    Patient counseled on monitoring and reporting fatigue to provider - verbalized understanding

## 2023-10-31 NOTE — PROGRESS NOTES
Pt arrived to infusion suite for 1L NS bolus over 1 hr. Patient tolerated well. Return appointment provided.     Limited head-to-toe assessment due to privacy issues and visit reason though the opportunity was given for patient to express any concerns.

## 2023-11-03 ENCOUNTER — TELEPHONE (OUTPATIENT)
Dept: CARDIOLOGY | Facility: CLINIC | Age: 73
End: 2023-11-03

## 2023-11-03 ENCOUNTER — INFUSION (OUTPATIENT)
Dept: INFECTIOUS DISEASES | Facility: HOSPITAL | Age: 73
End: 2023-11-03
Payer: MEDICARE

## 2023-11-03 ENCOUNTER — OFFICE VISIT (OUTPATIENT)
Dept: CARDIOLOGY | Facility: CLINIC | Age: 73
End: 2023-11-03
Payer: MEDICARE

## 2023-11-03 VITALS
SYSTOLIC BLOOD PRESSURE: 163 MMHG | WEIGHT: 210.31 LBS | DIASTOLIC BLOOD PRESSURE: 90 MMHG | OXYGEN SATURATION: 96 % | HEART RATE: 62 BPM | HEIGHT: 74 IN | TEMPERATURE: 98 F | BODY MASS INDEX: 26.99 KG/M2 | RESPIRATION RATE: 18 BRPM

## 2023-11-03 VITALS
HEART RATE: 79 BPM | OXYGEN SATURATION: 99 % | BODY MASS INDEX: 27.48 KG/M2 | DIASTOLIC BLOOD PRESSURE: 90 MMHG | WEIGHT: 214.06 LBS | SYSTOLIC BLOOD PRESSURE: 170 MMHG

## 2023-11-03 DIAGNOSIS — E86.0 DEHYDRATION: ICD-10-CM

## 2023-11-03 DIAGNOSIS — I10 ESSENTIAL HYPERTENSION: ICD-10-CM

## 2023-11-03 DIAGNOSIS — R94.31 PROLONGED QT INTERVAL: ICD-10-CM

## 2023-11-03 DIAGNOSIS — I16.0 HYPERTENSIVE URGENCY: ICD-10-CM

## 2023-11-03 DIAGNOSIS — E11.22 TYPE 2 DIABETES MELLITUS WITH STAGE 3B CHRONIC KIDNEY DISEASE, WITHOUT LONG-TERM CURRENT USE OF INSULIN: Primary | ICD-10-CM

## 2023-11-03 DIAGNOSIS — Z95.828 PRESENCE OF ARTERIAL STENT: ICD-10-CM

## 2023-11-03 DIAGNOSIS — I21.4 NSTEMI (NON-ST ELEVATED MYOCARDIAL INFARCTION): ICD-10-CM

## 2023-11-03 DIAGNOSIS — I71.21 ASCENDING AORTIC ANEURYSM, UNSPECIFIED WHETHER RUPTURED: ICD-10-CM

## 2023-11-03 DIAGNOSIS — R94.31 ABNORMAL EKG: Primary | ICD-10-CM

## 2023-11-03 DIAGNOSIS — I47.29 PAROXYSMAL VENTRICULAR TACHYCARDIA: ICD-10-CM

## 2023-11-03 DIAGNOSIS — I95.1 ORTHOSTATIC HYPOTENSION: ICD-10-CM

## 2023-11-03 DIAGNOSIS — Z93.3 COLOSTOMY PRESENT: ICD-10-CM

## 2023-11-03 DIAGNOSIS — I47.20 VT (VENTRICULAR TACHYCARDIA): ICD-10-CM

## 2023-11-03 DIAGNOSIS — N18.32 TYPE 2 DIABETES MELLITUS WITH STAGE 3B CHRONIC KIDNEY DISEASE, WITHOUT LONG-TERM CURRENT USE OF INSULIN: Primary | ICD-10-CM

## 2023-11-03 DIAGNOSIS — I70.0 AORTIC ATHEROSCLEROSIS: ICD-10-CM

## 2023-11-03 DIAGNOSIS — I45.2 BIFASCICULAR BLOCK: ICD-10-CM

## 2023-11-03 PROCEDURE — 99215 PR OFFICE/OUTPT VISIT, EST, LEVL V, 40-54 MIN: ICD-10-PCS | Mod: HCNC,S$GLB,, | Performed by: INTERNAL MEDICINE

## 2023-11-03 PROCEDURE — 1126F AMNT PAIN NOTED NONE PRSNT: CPT | Mod: HCNC,CPTII,S$GLB, | Performed by: INTERNAL MEDICINE

## 2023-11-03 PROCEDURE — 99999 PR PBB SHADOW E&M-EST. PATIENT-LVL IV: ICD-10-PCS | Mod: PBBFAC,HCNC,, | Performed by: INTERNAL MEDICINE

## 2023-11-03 PROCEDURE — 3080F PR MOST RECENT DIASTOLIC BLOOD PRESSURE >= 90 MM HG: ICD-10-PCS | Mod: HCNC,CPTII,S$GLB, | Performed by: INTERNAL MEDICINE

## 2023-11-03 PROCEDURE — 3008F PR BODY MASS INDEX (BMI) DOCUMENTED: ICD-10-PCS | Mod: HCNC,CPTII,S$GLB, | Performed by: INTERNAL MEDICINE

## 2023-11-03 PROCEDURE — 3288F PR FALLS RISK ASSESSMENT DOCUMENTED: ICD-10-PCS | Mod: HCNC,CPTII,S$GLB, | Performed by: INTERNAL MEDICINE

## 2023-11-03 PROCEDURE — 3077F SYST BP >= 140 MM HG: CPT | Mod: HCNC,CPTII,S$GLB, | Performed by: INTERNAL MEDICINE

## 2023-11-03 PROCEDURE — 1159F PR MEDICATION LIST DOCUMENTED IN MEDICAL RECORD: ICD-10-PCS | Mod: HCNC,CPTII,S$GLB, | Performed by: INTERNAL MEDICINE

## 2023-11-03 PROCEDURE — 3077F PR MOST RECENT SYSTOLIC BLOOD PRESSURE >= 140 MM HG: ICD-10-PCS | Mod: HCNC,CPTII,S$GLB, | Performed by: INTERNAL MEDICINE

## 2023-11-03 PROCEDURE — 3008F BODY MASS INDEX DOCD: CPT | Mod: HCNC,CPTII,S$GLB, | Performed by: INTERNAL MEDICINE

## 2023-11-03 PROCEDURE — 25000003 PHARM REV CODE 250: Mod: HCNC | Performed by: NURSE PRACTITIONER

## 2023-11-03 PROCEDURE — 3060F POS MICROALBUMINURIA REV: CPT | Mod: HCNC,CPTII,S$GLB, | Performed by: INTERNAL MEDICINE

## 2023-11-03 PROCEDURE — 1101F PR PT FALLS ASSESS DOC 0-1 FALLS W/OUT INJ PAST YR: ICD-10-PCS | Mod: HCNC,CPTII,S$GLB, | Performed by: INTERNAL MEDICINE

## 2023-11-03 PROCEDURE — 3066F NEPHROPATHY DOC TX: CPT | Mod: HCNC,CPTII,S$GLB, | Performed by: INTERNAL MEDICINE

## 2023-11-03 PROCEDURE — 3066F PR DOCUMENTATION OF TREATMENT FOR NEPHROPATHY: ICD-10-PCS | Mod: HCNC,CPTII,S$GLB, | Performed by: INTERNAL MEDICINE

## 2023-11-03 PROCEDURE — 3060F PR POS MICROALBUMINURIA RESULT DOCUMENTED/REVIEW: ICD-10-PCS | Mod: HCNC,CPTII,S$GLB, | Performed by: INTERNAL MEDICINE

## 2023-11-03 PROCEDURE — 3044F PR MOST RECENT HEMOGLOBIN A1C LEVEL <7.0%: ICD-10-PCS | Mod: HCNC,CPTII,S$GLB, | Performed by: INTERNAL MEDICINE

## 2023-11-03 PROCEDURE — 3288F FALL RISK ASSESSMENT DOCD: CPT | Mod: HCNC,CPTII,S$GLB, | Performed by: INTERNAL MEDICINE

## 2023-11-03 PROCEDURE — 99999 PR PBB SHADOW E&M-EST. PATIENT-LVL IV: CPT | Mod: PBBFAC,HCNC,, | Performed by: INTERNAL MEDICINE

## 2023-11-03 PROCEDURE — 99215 OFFICE O/P EST HI 40 MIN: CPT | Mod: HCNC,S$GLB,, | Performed by: INTERNAL MEDICINE

## 2023-11-03 PROCEDURE — 1101F PT FALLS ASSESS-DOCD LE1/YR: CPT | Mod: HCNC,CPTII,S$GLB, | Performed by: INTERNAL MEDICINE

## 2023-11-03 PROCEDURE — 1159F MED LIST DOCD IN RCRD: CPT | Mod: HCNC,CPTII,S$GLB, | Performed by: INTERNAL MEDICINE

## 2023-11-03 PROCEDURE — 1160F PR REVIEW ALL MEDS BY PRESCRIBER/CLIN PHARMACIST DOCUMENTED: ICD-10-PCS | Mod: HCNC,CPTII,S$GLB, | Performed by: INTERNAL MEDICINE

## 2023-11-03 PROCEDURE — 1126F PR PAIN SEVERITY QUANTIFIED, NO PAIN PRESENT: ICD-10-PCS | Mod: HCNC,CPTII,S$GLB, | Performed by: INTERNAL MEDICINE

## 2023-11-03 PROCEDURE — 96360 HYDRATION IV INFUSION INIT: CPT | Mod: HCNC

## 2023-11-03 PROCEDURE — 3044F HG A1C LEVEL LT 7.0%: CPT | Mod: HCNC,CPTII,S$GLB, | Performed by: INTERNAL MEDICINE

## 2023-11-03 PROCEDURE — 1160F RVW MEDS BY RX/DR IN RCRD: CPT | Mod: HCNC,CPTII,S$GLB, | Performed by: INTERNAL MEDICINE

## 2023-11-03 PROCEDURE — 3080F DIAST BP >= 90 MM HG: CPT | Mod: HCNC,CPTII,S$GLB, | Performed by: INTERNAL MEDICINE

## 2023-11-03 RX ORDER — SODIUM CHLORIDE 0.9 % (FLUSH) 0.9 %
10 SYRINGE (ML) INJECTION
Status: CANCELLED | OUTPATIENT
Start: 2023-11-07

## 2023-11-03 RX ORDER — HEPARIN 100 UNIT/ML
500 SYRINGE INTRAVENOUS
Status: CANCELLED | OUTPATIENT
Start: 2023-11-07

## 2023-11-03 RX ADMIN — SODIUM CHLORIDE 1000 ML: 9 INJECTION, SOLUTION INTRAVENOUS at 08:11

## 2023-11-03 NOTE — PROGRESS NOTES
"[facility] [address] [address]  Cardiology Clinic Note      Chief Complaint  Chief Complaint   Patient presents with    Follow-up       HPI:    72-year-old male with a past medical history SBO, s/p colostomy, GERD, parotid mass s/p resection, BPH & prostate CA s/p TURP, CKD 4, pleural plaque, ("neurogenic") orthostatic hypotension, hypertension, hyperlipidemia, DM2, carotid stenosis not hemodynamically significant ultrasound 03/2023, ascending thoracic aorta 4.4 cm CT 2018 then 4.7 cm in 2023, lower extremity arterial duplex 09/2022 no evidence of hemodynamically significant stenosis nonvisualization of the peroneal arteries prior SAKSHI 2019 normal, CAD status post PCI to mid LAD 2017 followed by cardiac catheterization 2021 patent stent nonobstructive disease prior MPI 2018 inferolateral ischemia, sustained ventricular tachycardia documented on discharge summary 4/2021 on amiodarone previous loop recorder implantation 2021 presumed link transmission showed several episodes of ventricular tachycardia with longest episode 36 beats also documented either atrial fibrillation RVR with aberrant conduction or AV radha reentry tachycardia (this was documented by an outside provider note scan 03/2021), echo 09/2023 normal EF grade 1 diastolic dysfunction mild LVH normal RV prior echo 12/2022 normal EF mild LVH diastolic function indeterminate normal RV PASP 32+ CVP which could not be determined ascending aorta mildly dilated    Hospitalized with cholestatic jaundice s/p cholecystectomy with lysis of adhesions 2/14     Nephrology has IVF scheduled twice weekly secondary to losses from ostomy  Staying hydrated no NSAIDs     Neurology is following as well suspected autonomic dysfunction with orthostatic component     Seen by gastroenterology as well to add bulk to stool      Last note from outside cardiology which states patient has pAF in note scan 2/3/2023    Seen by electrophysiology amiodarone was held and suggested loop " recorder.  AF episodes seen on previous monitoring were most consistent with artifact.    Patient has been managing his labile hypertension with p.r.n. hydralazine but he was still taking Toprol for other indications    Previously checked CMP with markedly elevated LFTs sent to ER and held statin already off amio  LFTs came down it appears he was discharged from the ER directly at that time  Statin held then restarted when LFTs improved    PCP place patient on oxybutynin for high ileostomy output  Recently seen by Nephrology continuing IVF twice weekly     Frequently seen in the emergency department for hypotension/volume depletion  PEC'd for suicidal ideation at 1 of these ER visits    Seen by Cardiothoracic surgery for aortic aneurysm    Most recently consulted Endocrinology to rule out adrenal insufficiency as the patient is no longer tolerating his metoprolol    HTN today    Medications  Current Outpatient Medications   Medication Sig Dispense Refill    ACCU-CHEK PAUL CONTROL SOLN Soln 1 drop by NOT APPLICABLE route once daily.      ACCU-CHEK SOFTCLIX LANCETS Misc TEST BLOOD SUGAR THREE TIMES DAILY 300 each 3    allopurinoL (ZYLOPRIM) 300 MG tablet Take 1.5 tablets (450 mg total) by mouth once daily. 135 tablet 3    blood sugar diagnostic (ONETOUCH ULTRA TEST) Strp USE ONE STRIP TO CHECK GLUCOSE EVERY  each 11    blood-glucose meter (ACCU-CHEK GUIDE GLUCOSE METER[place] [facility] [place] 1 each 0    calcium-vitamin D3 (OS-DAKOTA 500 + D3) 500 mg-5 mcg (200 unit) per tablet Take 1 tablet by mouth once daily. 30 tablet 1    capsaicin 0.1 % Crea Apply painful joint TID 56.6 g 1    cholestyramine-aspartame (QUESTRAN LIGHT) 4 gram PwPk Take 1 packet (4 g total) by mouth 3 (three) times daily with meals. 270 packet 1    DROPSAFE ALCOHOL PREP PADS PadM USE TOPICALLY 5 (FIVE) TIMES DAILY. 140 each 1    fludrocortisone (FLORINEF) 0.1 mg Tab Take 1 tablet (100 mcg total) by mouth once daily. 180 tablet 0    gabapentin  (NEURONTIN) 100 MG capsule Take 2 capsules (200 mg total) by mouth 3 (three) times daily. 180 capsule 1    glimepiride (AMARYL) 4 MG tablet TAKE 1 TABLET (4 MG TOTAL) BY MOUTH 2 (TWO) TIMES DAILY BEFORE MEALS. 180 tablet 1    hydrALAZINE (APRESOLINE) 25 MG tablet TAKE 1 TABLET THREE TIMES DAILY AS NEEDED FOR HIGH BLOOD PRESSURE: SYSTOLIC ABOVE 180 OR DIASTOLIC ABOVE 100. 90 tablet 1    LIDOcaine (LIDODERM) 5 % Place 1 patch onto the skin as needed. Remove & Discard patch within 12 hours or as directed by MD 30 patch 0    magnesium oxide (MAG-OX) 400 mg (241.3 mg magnesium) tablet Take 1 tablet (400 mg total) by mouth once daily. 30 tablet 2    metoprolol succinate (TOPROL-XL) 25 MG 24 hr tablet Take 1 tablet (25 mg total) by mouth once daily. 90 tablet 1    midodrine (PROAMATINE) 10 MG tablet Take 1 tablet (10 mg total) by mouth daily as needed (Hypotension). 90 tablet 3    oxyCODONE (ROXICODONE) 5 MG immediate release tablet Take 1 tablet (5 mg total) by mouth every 8 (eight) hours as needed for Pain. 20 tablet 0    pantoprazole (PROTONIX) 40 MG tablet Take 1 tablet (40 mg total) by mouth once daily. 90 tablet 0    pravastatin (PRAVACHOL) 40 MG tablet TAKE 1 TABLET ONE TIME DAILY 90 tablet 1    predniSONE (DELTASONE) 20 MG tablet Take 1 tablet (20 mg total) by mouth 2 (two) times daily. 8 tablet 0    sodium bicarbonate 650 MG tablet Take 1 tablet (650 mg total) by mouth once daily. 90 tablet 3    aspirin (ECOTRIN) 81 MG EC tablet Take 1 tablet (81 mg total) by mouth once daily. 30 tablet 1    colchicine, gout, (COLCRYS) 0.6 mg tablet Take 1 tablet (0.6 mg total) by mouth once daily. 10 tablet 0     No current facility-administered medications for this visit.     Facility-Administered Medications Ordered in Other Visits   Medication Dose Route Frequency Provider Last Rate Last Admin    sodium chloride 0.9% in 1,000 mL infusion   Intravenous Continuous Order, Paper            History  Past Medical History:    Diagnosis Date    Basal cell carcinoma     BPH (benign prostatic hyperplasia)     s/p TURP    Carotid stenosis     Chronic kidney disease     Claudication     Coronary artery disease     DDD (degenerative disc disease) 10/21/2013    Diabetes mellitus with renal complications     Disc disease, degenerative, cervical     Encounter for blood transfusion     GERD (gastroesophageal reflux disease)     Gout, chronic     History of ulcerative colitis     s/p colectomy and ileostomy    HLD (hyperlipidemia)     HTN (hypertension)     Ileostomy in place 1982    RBBB     Squamous cell carcinoma 03/08/2018    Left superior helix near insertion    Squamous cell carcinoma 04/12/2018    Left forearm x 5    Ventricular tachycardia      Past Surgical History:   Procedure Laterality Date    cardiac stents      CATARACT EXTRACTION Bilateral     CERVICAL FUSION      CHOLECYSTECTOMY N/A 2/14/2023    Procedure: CHOLECYSTECTOMY;  Surgeon: Mike Joyce MD;  Location: [facility];  Service: General;  Laterality: N/A;  very high probabilty of converison to open    colectomy and ileostomy  1985    ENDOSCOPIC ULTRASOUND OF UPPER GASTROINTESTINAL TRACT N/A 2/10/2023    Procedure: ULTRASOUND, UPPER GI TRACT, ENDOSCOPIC;  Surgeon: Poli Fuller MD;  Location: [facility];  Service: Endoscopy;  Laterality: N/A;    ERCP N/A 2/10/2023    Procedure: ERCP (ENDOSCOPIC RETROGRADE CHOLANGIOPANCREATOGRAPHY);  Surgeon: Poli Fuller MD;  Location: [facility] ENDO;  Service: Endoscopy;  Laterality: N/A;    EXCISION OF PAROTID GLAND Left 12/18/2020    Procedure: EXCISION, PAROTID GLAND;  Surgeon: Michael Pinzon MD;  Location: [address];  Service: ENT;  Laterality: Left;    INSERTION OF IMPLANTABLE LOOP RECORDER  06/07/2021    INSERTION OF IMPLANTABLE LOOP RECORDER Left 6/7/2021    Procedure: INSERTION, IMPLANTABLE LOOP RECORDER;  Surgeon: Romario Dao MD;  Location: [facility] CATH LAB;  Service: Cardiology;  Laterality: Left;    LEFT HEART  CATHETERIZATION Right 4/15/2021    Procedure: CATHETERIZATION, HEART, LEFT;  Surgeon: Marcio Jones MD;  Location: [facility] CATH LAB;  Service: Cardiology;  Laterality: Right;    LYSIS OF ADHESIONS N/A 11/9/2020    Procedure: LYSIS, ADHESIONS,  ERAS low;  Surgeon: CED Haley MD;  Location: [facility] OR 2ND FLR;  Service: Colon and Rectal;  Laterality: N/A;    LYSIS OF ADHESIONS N/A 2/14/2023    Procedure: LYSIS, ADHESIONS;  Surgeon: Mike Joyce MD;  Location: [facility] OR;  Service: General;  Laterality: N/A;    POUCHOSCOPY N/A 4/6/2022    Procedure: ENDOSCOPY, POUCH, SMALL INTESTINE, DIAGNOSTIC;  Surgeon: Dieter Juarez MD;  Location: [facility] ENDO (2ND FLR);  Service: Endoscopy;  Laterality: N/A;    REPAIR, HERNIA, PARASTOMAL N/A 11/9/2020    Procedure: REPAIR, HERNIA, PARASTOMAL;  Surgeon: CED Haley MD;  Location: [facility] OR 2ND FLR;  Service: Colon and Rectal;  Laterality: N/A;    TRANSURETHRAL RESECTION OF PROSTATE (TURP) WITHOUT USE OF LASER N/A 1/23/2019    Procedure: TURP, WITHOUT USING LASER BIPOLAR;  Surgeon: Catarino Mota MD;  Location: [facility] OR 1ST FLR;  Service: Urology;  Laterality: N/A;  1.5 HOURS     Social History     Socioeconomic History    Marital status:     Number of children: 1   Occupational History    Occupation:  x 44 years     Comment: Retired    Occupation: [place]    Tobacco Use    Smoking status: Never    Smokeless tobacco: Never   Substance and Sexual Activity    Alcohol use: No    Drug use: No    Sexual activity: Not Currently     Partners: Female     Social Determinants of Health     Financial Resource Strain: Low Risk  (9/28/2023)    Overall Financial Resource Strain (CARDIA)     Difficulty of Paying Living Expenses: Not hard at all   Food Insecurity: No Food Insecurity (9/28/2023)    Hunger Vital Sign     Worried About Running Out of Food in the Last Year: Never true     Ran Out of Food in the Last Year: Never true   Transportation  Needs: No Transportation Needs (9/28/2023)    PRAPARE - Transportation     Lack of Transportation (Medical): No     Lack of Transportation (Non-Medical): No   Physical Activity: Sufficiently Active (5/28/2023)    Exercise Vital Sign     Days of Exercise per Week: 7 days     Minutes of Exercise per Session: 60 min   Stress: No Stress Concern Present (9/28/2023)    [employer] - Occupational Stress Questionnaire     Feeling of Stress : Only a little   Social Connections: Unknown (9/28/2023)    Social Connection and Isolation Panel [NHANES]     Frequency of Communication with Friends and Family: More than three times a week     Frequency of Social Gatherings with Friends and Family: More than three times a week     Attends Synagogue Services: Patient refused     Active Member of Clubs or Organizations: Patient refused     Attends Club or Organization Meetings: Patient refused     Marital Status:    Housing Stability: Low Risk  (9/28/2023)    Housing Stability Vital Sign     Unable to Pay for Housing in the Last Year: No     Number of Places Lived in the Last Year: 1     Unstable Housing in the Last Year: No     Family History   Problem Relation Age of Onset    Cancer Father     Diabetes Father     Dementia Mother     Melanoma Neg Hx     Hypertension Neg Hx     Arthritis Neg Hx         Allergies  Review of patient's allergies indicates:   Allergen Reactions    Crestor [rosuvastatin]     Lipitor [atorvastatin]        Review of Systems   Review of Systems   Constitutional: Negative for fever.   HENT:  Negative for nosebleeds.    Eyes:  Negative for visual disturbance.   Cardiovascular:  Negative for chest pain, claudication, dyspnea on exertion, palpitations and syncope.   Respiratory:  Negative for cough, hemoptysis and wheezing.    Endocrine: Negative for cold intolerance, heat intolerance, polyphagia and polyuria.   Hematologic/Lymphatic: Negative for bleeding problem.   Skin:   Negative for rash.   Musculoskeletal:  Negative for myalgias.   Gastrointestinal:  Negative for hematemesis, hematochezia, nausea and vomiting.   Genitourinary:  Negative for dysuria.   Neurological:  Negative for focal weakness and sensory change.   Psychiatric/Behavioral:  Negative for altered mental status.        Physical Exam  Vitals:    11/03/23 1418   BP: (!) 170/90   Pulse: 79       Wt Readings from Last 1 Encounters:   11/03/23 97.1 kg (214 lb 1.1 oz)     Physical Exam  HENT:      Head: Normocephalic and atraumatic.      Mouth/Throat:      Mouth: Mucous membranes are moist.   Eyes:      Extraocular Movements: Extraocular movements intact.      Pupils: Pupils are equal, round, and reactive to light.   Neck:      Vascular: No carotid bruit or JVD.   Cardiovascular:      Rate and Rhythm: Normal rate and regular rhythm.      Pulses: Normal pulses and intact distal pulses.      Heart sounds: Normal heart sounds. No murmur heard.     No friction rub. No gallop.   Pulmonary:      Effort: Pulmonary effort is normal.      Breath sounds: Normal breath sounds.   Abdominal:      Tenderness: There is no abdominal tenderness. There is no guarding or rebound.   Musculoskeletal:      Right lower leg: No edema.      Left lower leg: No edema.   Skin:     General: Skin is warm and dry.      Capillary Refill: Capillary refill takes less than 2 seconds.   Neurological:      General: No focal deficit present.      Mental Status: He is alert and oriented to person, place, and time.   Psychiatric:         Mood and Affect: Mood normal.         Labs  Lab Visit on 11/03/2023   Component Date Value Ref Range Status    Magnesium 11/03/2023 1.6  1.6 - 2.6 mg/dL Final    WBC 11/03/2023 7.48  3.90 - 12.70 K/uL Final    RBC 11/03/2023 3.74 (L)  4.60 - 6.20 M/uL Final    Hemoglobin 11/03/2023 10.9 (L)  14.0 - 18.0 g/dL Final    Hematocrit 11/03/2023 35.1 (L)  40.0 - 54.0 % Final    MCV 11/03/2023 94  82 - 98 fL Final    MCH 11/03/2023 29.1   27.0 - 31.0 pg Final    MCHC 11/03/2023 31.1 (L)  32.0 - 36.0 g/dL Final    RDW 11/03/2023 14.0  11.5 - 14.5 % Final    Platelets 11/03/2023 266  150 - 450 K/uL Final    MPV 11/03/2023 10.4  9.2 - 12.9 fL Final    Immature Granulocytes 11/03/2023 0.7 (H)  0.0 - 0.5 % Final    Gran # (ANC) 11/03/2023 5.4  1.8 - 7.7 K/uL Final    Immature Grans (Abs) 11/03/2023 0.05 (H)  0.00 - 0.04 K/uL Final    Comment: Mild elevation in immature granulocytes is non specific and   can be seen in a variety of conditions including stress response,   acute inflammation, trauma and pregnancy. Correlation with other   laboratory and clinical findings is essential.      Lymph # 11/03/2023 1.5  1.0 - 4.8 K/uL Final    Mono # 11/03/2023 0.4  0.3 - 1.0 K/uL Final    Eos # 11/03/2023 0.1  0.0 - 0.5 K/uL Final    Baso # 11/03/2023 0.04  0.00 - 0.20 K/uL Final    nRBC 11/03/2023 0  0 /100 WBC Final    Gran % 11/03/2023 72.4  38.0 - 73.0 % Final    Lymph % 11/03/2023 19.8  18.0 - 48.0 % Final    Mono % 11/03/2023 5.1  4.0 - 15.0 % Final    Eosinophil % 11/03/2023 1.5  0.0 - 8.0 % Final    Basophil % 11/03/2023 0.5  0.0 - 1.9 % Final    Differential Method 11/03/2023 Automated   Final    Glucose 11/03/2023 163 (H)  70 - 110 mg/dL Final    Sodium 11/03/2023 140  136 - 145 mmol/L Final    Potassium 11/03/2023 4.2  3.5 - 5.1 mmol/L Final    Chloride 11/03/2023 110  95 - 110 mmol/L Final    CO2 11/03/2023 23  23 - 29 mmol/L Final    BUN 11/03/2023 38 (H)  8 - 23 mg/dL Final    Calcium 11/03/2023 8.7  8.7 - 10.5 mg/dL Final    Creatinine 11/03/2023 2.2 (H)  0.5 - 1.4 mg/dL Final    Albumin 11/03/2023 3.0 (L)  3.5 - 5.2 g/dL Final    Phosphorus 11/03/2023 3.7  2.7 - 4.5 mg/dL Final    eGFR 11/03/2023 30.7 (A)  >60 mL/min/1.73 m^2 Final    Anion Gap 11/03/2023 7 (L)  8 - 16 mmol/L Final   Lab Visit on 10/26/2023   Component Date Value Ref Range Status    WBC 10/26/2023 8.91  3.90 - 12.70 K/uL Final    RBC 10/26/2023 3.92 (L)  4.60 - 6.20 M/uL Final     Hemoglobin 10/26/2023 11.5 (L)  14.0 - 18.0 g/dL Final    Hematocrit 10/26/2023 36.6 (L)  40.0 - 54.0 % Final    MCV 10/26/2023 93  82 - 98 fL Final    MCH 10/26/2023 29.3  27.0 - 31.0 pg Final    MCHC 10/26/2023 31.4 (L)  32.0 - 36.0 g/dL Final    RDW 10/26/2023 13.8  11.5 - 14.5 % Final    Platelets 10/26/2023 232  150 - 450 K/uL Final    MPV 10/26/2023 10.1  9.2 - 12.9 fL Final    Immature Granulocytes 10/26/2023 0.4  0.0 - 0.5 % Final    Gran # (ANC) 10/26/2023 6.7  1.8 - 7.7 K/uL Final    Immature Grans (Abs) 10/26/2023 0.04  0.00 - 0.04 K/uL Final    Comment: Mild elevation in immature granulocytes is non specific and   can be seen in a variety of conditions including stress response,   acute inflammation, trauma and pregnancy. Correlation with other   laboratory and clinical findings is essential.      Lymph # 10/26/2023 1.5  1.0 - 4.8 K/uL Final    Mono # 10/26/2023 0.5  0.3 - 1.0 K/uL Final    Eos # 10/26/2023 0.1  0.0 - 0.5 K/uL Final    Baso # 10/26/2023 0.04  0.00 - 0.20 K/uL Final    nRBC 10/26/2023 0  0 /100 WBC Final    Gran % 10/26/2023 75.4 (H)  38.0 - 73.0 % Final    Lymph % 10/26/2023 17.3 (L)  18.0 - 48.0 % Final    Mono % 10/26/2023 5.5  4.0 - 15.0 % Final    Eosinophil % 10/26/2023 1.0  0.0 - 8.0 % Final    Basophil % 10/26/2023 0.4  0.0 - 1.9 % Final    Differential Method 10/26/2023 Automated   Final    Sodium 10/26/2023 143  136 - 145 mmol/L Final    Potassium 10/26/2023 3.9  3.5 - 5.1 mmol/L Final    Chloride 10/26/2023 104  95 - 110 mmol/L Final    CO2 10/26/2023 22 (L)  23 - 29 mmol/L Final    Glucose 10/26/2023 167 (H)  70 - 110 mg/dL Final    BUN 10/26/2023 40 (H)  8 - 23 mg/dL Final    Creatinine 10/26/2023 3.6 (H)  0.5 - 1.4 mg/dL Final    Calcium 10/26/2023 9.1  8.7 - 10.5 mg/dL Final    Total Protein 10/26/2023 7.5  6.0 - 8.4 g/dL Final    Albumin 10/26/2023 3.3 (L)  3.5 - 5.2 g/dL Final    Total Bilirubin 10/26/2023 0.5  0.1 - 1.0 mg/dL Final    Comment: For infants and newborns,  interpretation of results should be based  on gestational age, weight and in agreement with clinical  observations.    Premature Infant recommended reference ranges:  Up to 24 hours.............<8.0 mg/dL  Up to 48 hours............<12.0 mg/dL  3-5 days..................<15.0 mg/dL  6-29 days.................<15.0 mg/dL      Alkaline Phosphatase 10/26/2023 160 (H)  55 - 135 U/L Final    AST 10/26/2023 25  10 - 40 U/L Final    ALT 10/26/2023 24  10 - 44 U/L Final    eGFR 10/26/2023 17.0 (A)  >60 mL/min/1.73 m^2 Final    Anion Gap 10/26/2023 17 (H)  8 - 16 mmol/L Final    Hemoglobin A1C 10/26/2023 6.7 (H)  4.0 - 5.6 % Final    Comment: ADA Screening Guidelines:  5.7-6.4%  Consistent with prediabetes  >or=6.5%  Consistent with diabetes    High levels of fetal hemoglobin interfere with the HbA1C  assay. Heterozygous hemoglobin variants (HbS, HgC, etc)do  not significantly interfere with this assay.   However, presence of multiple variants may affect accuracy.      Estimated Avg Glucose 10/26/2023 146 (H)  68 - 131 mg/dL Final    TSH 10/26/2023 0.957  0.400 - 4.000 uIU/mL Final    Free T4 10/26/2023 0.94  0.71 - 1.51 ng/dL Final    Aldosterone 10/26/2023 34.8  ng/dL Final    Comment: REFERENCE RANGE:    Upright              <= 39.2 ng/dL    Supine               <= 23.2 ng/dL    Test performed at [facility],  [address], [place]  [address] [phone]  Kamille Cole MD, PhD - Medical Director      Testosterone, Total 10/26/2023 29 (L)  304 - 1227 ng/dL Final   Admission on 10/23/2023, Discharged on 10/23/2023   Component Date Value Ref Range Status    WBC 10/23/2023 8.13  3.90 - 12.70 K/uL Final    RBC 10/23/2023 3.89 (L)  4.60 - 6.20 M/uL Final    Hemoglobin 10/23/2023 11.5 (L)  14.0 - 18.0 g/dL Final    Hematocrit 10/23/2023 35.1 (L)  40.0 - 54.0 % Final    MCV 10/23/2023 90  82 - 98 fL Final    MCH 10/23/2023 29.6  27.0 - 31.0 pg Final    MCHC 10/23/2023 32.8  32.0 - 36.0 g/dL Final     RDW 10/23/2023 13.6  11.5 - 14.5 % Final    Platelets 10/23/2023 233  150 - 450 K/uL Final    MPV 10/23/2023 10.8  9.2 - 12.9 fL Final    Immature Granulocytes 10/23/2023 0.4  0.0 - 0.5 % Final    Gran # (ANC) 10/23/2023 6.0  1.8 - 7.7 K/uL Final    Immature Grans (Abs) 10/23/2023 0.03  0.00 - 0.04 K/uL Final    Comment: Mild elevation in immature granulocytes is non specific and   can be seen in a variety of conditions including stress response,   acute inflammation, trauma and pregnancy. Correlation with other   laboratory and clinical findings is essential.      Lymph # 10/23/2023 1.5  1.0 - 4.8 K/uL Final    Mono # 10/23/2023 0.5  0.3 - 1.0 K/uL Final    Eos # 10/23/2023 0.1  0.0 - 0.5 K/uL Final    Baso # 10/23/2023 0.04  0.00 - 0.20 K/uL Final    nRBC 10/23/2023 0  0 /100 WBC Final    Gran % 10/23/2023 73.7 (H)  38.0 - 73.0 % Final    Lymph % 10/23/2023 18.5  18.0 - 48.0 % Final    Mono % 10/23/2023 6.0  4.0 - 15.0 % Final    Eosinophil % 10/23/2023 0.9  0.0 - 8.0 % Final    Basophil % 10/23/2023 0.5  0.0 - 1.9 % Final    Differential Method 10/23/2023 Automated   Final    Sodium 10/23/2023 141  136 - 145 mmol/L Final    Potassium 10/23/2023 3.7  3.5 - 5.1 mmol/L Final    Chloride 10/23/2023 107  95 - 110 mmol/L Final    CO2 10/23/2023 17 (L)  23 - 29 mmol/L Final    Glucose 10/23/2023 125 (H)  70 - 110 mg/dL Final    BUN 10/23/2023 37 (H)  8 - 23 mg/dL Final    Creatinine 10/23/2023 3.1 (H)  0.5 - 1.4 mg/dL Final    Calcium 10/23/2023 9.1  8.7 - 10.5 mg/dL Final    Total Protein 10/23/2023 7.0  6.0 - 8.4 g/dL Final    Albumin 10/23/2023 3.0 (L)  3.5 - 5.2 g/dL Final    Total Bilirubin 10/23/2023 0.6  0.1 - 1.0 mg/dL Final    Comment: For infants and newborns, interpretation of results should be based  on gestational age, weight and in agreement with clinical  observations.    Premature Infant recommended reference ranges:  Up to 24 hours.............<8.0 mg/dL  Up to 48 hours............<12.0 mg/dL  3-5  days..................<15.0 mg/dL  6-29 days.................<15.0 mg/dL      Alkaline Phosphatase 10/23/2023 161 (H)  55 - 135 U/L Final    AST 10/23/2023 35  10 - 40 U/L Final    ALT 10/23/2023 34  10 - 44 U/L Final    eGFR 10/23/2023 20.3 (A)  >60 mL/min/1.73 m^2 Final    Anion Gap 10/23/2023 17 (H)  8 - 16 mmol/L Final    Troponin I 10/23/2023 0.033 (H)  0.000 - 0.026 ng/mL Final    Comment: The reference interval for Troponin I represents the 99th percentile   cutoff   for our facility and is consistent with 3rd generation assay   performance.      BNP 10/23/2023 156 (H)  0 - 99 pg/mL Final    Values of less than 100 pg/ml are consistent with non-CHF populations.    Magnesium 10/23/2023 1.3 (L)  1.6 - 2.6 mg/dL Final    Specimen UA 10/23/2023 Urine, Unspecified   Final    Color, UA 10/23/2023 Colorless (A)  Yellow, Straw, Stacy Final    Appearance, UA 10/23/2023 Clear  Clear Final    pH, UA 10/23/2023 6.0  5.0 - 8.0 Final    Specific Gravity, UA 10/23/2023 <1.005 (A)  1.005 - 1.030 Final    Protein, UA 10/23/2023 Negative  Negative Final    Comment: Recommend a 24 hour urine protein or a urine   protein/creatinine ratio if globulin induced proteinuria is  clinically suspected.      Glucose, UA 10/23/2023 Negative  Negative Final    Ketones, UA 10/23/2023 Negative  Negative Final    Bilirubin (UA) 10/23/2023 Negative  Negative Final    Occult Blood UA 10/23/2023 Negative  Negative Final    Nitrite, UA 10/23/2023 Negative  Negative Final    Urobilinogen, UA 10/23/2023 Negative  Negative EU/dL Final    Leukocytes, UA 10/23/2023 Negative  Negative Final    Troponin I 10/23/2023 0.019  0.000 - 0.026 ng/mL Final    Comment: The reference interval for Troponin I represents the 99th percentile   cutoff   for our facility and is consistent with 3rd generation assay   performance.     Lab Visit on 10/13/2023   Component Date Value Ref Range Status    Protein, Urine Random 10/13/2023 <7  0 - 15 mg/dL Final    Creatinine,  Urine 10/13/2023 32.0  23.0 - 375.0 mg/dL Final    Prot/Creat Ratio, Urine 10/13/2023 Unable to calculate  0.00 - 0.20 Final    Microalbumin, Urine 10/13/2023 14.0  ug/mL Final    Creatinine, Urine 10/13/2023 32.0  23.0 - 375.0 mg/dL Final    Microalb/Creat Ratio 10/13/2023 43.8 (H)  0.0 - 30.0 ug/mg Final   Admission on 10/10/2023, Discharged on 10/11/2023   Component Date Value Ref Range Status    WBC 10/10/2023 10.98  3.90 - 12.70 K/uL Final    RBC 10/10/2023 4.46 (L)  4.60 - 6.20 M/uL Final    Hemoglobin 10/10/2023 13.0 (L)  14.0 - 18.0 g/dL Final    Hematocrit 10/10/2023 40.7  40.0 - 54.0 % Final    MCV 10/10/2023 91  82 - 98 fL Final    MCH 10/10/2023 29.1  27.0 - 31.0 pg Final    MCHC 10/10/2023 31.9 (L)  32.0 - 36.0 g/dL Final    RDW 10/10/2023 14.2  11.5 - 14.5 % Final    Platelets 10/10/2023 230  150 - 450 K/uL Final    MPV 10/10/2023 10.2  9.2 - 12.9 fL Final    Immature Granulocytes 10/10/2023 0.4  0.0 - 0.5 % Final    Gran # (ANC) 10/10/2023 6.9  1.8 - 7.7 K/uL Final    Immature Grans (Abs) 10/10/2023 0.04  0.00 - 0.04 K/uL Final    Comment: Mild elevation in immature granulocytes is non specific and   can be seen in a variety of conditions including stress response,   acute inflammation, trauma and pregnancy. Correlation with other   laboratory and clinical findings is essential.      Lymph # 10/10/2023 3.2  1.0 - 4.8 K/uL Final    Mono # 10/10/2023 0.7  0.3 - 1.0 K/uL Final    Eos # 10/10/2023 0.1  0.0 - 0.5 K/uL Final    Baso # 10/10/2023 0.04  0.00 - 0.20 K/uL Final    nRBC 10/10/2023 0  0 /100 WBC Final    Gran % 10/10/2023 62.9  38.0 - 73.0 % Final    Lymph % 10/10/2023 29.1  18.0 - 48.0 % Final    Mono % 10/10/2023 6.5  4.0 - 15.0 % Final    Eosinophil % 10/10/2023 0.7  0.0 - 8.0 % Final    Basophil % 10/10/2023 0.4  0.0 - 1.9 % Final    Differential Method 10/10/2023 Automated   Final    Sodium 10/10/2023 141  136 - 145 mmol/L Final    Potassium 10/10/2023 3.5  3.5 - 5.1 mmol/L Final    Chloride  10/10/2023 104  95 - 110 mmol/L Final    CO2 10/10/2023 20 (L)  23 - 29 mmol/L Final    Glucose 10/10/2023 71  70 - 110 mg/dL Final    BUN 10/10/2023 35 (H)  8 - 23 mg/dL Final    Creatinine 10/10/2023 3.1 (H)  0.5 - 1.4 mg/dL Final    Calcium 10/10/2023 9.7  8.7 - 10.5 mg/dL Final    Total Protein 10/10/2023 8.0  6.0 - 8.4 g/dL Final    Albumin 10/10/2023 3.8  3.5 - 5.2 g/dL Final    Total Bilirubin 10/10/2023 0.6  0.1 - 1.0 mg/dL Final    Comment: For infants and newborns, interpretation of results should be based  on gestational age, weight and in agreement with clinical  observations.    Premature Infant recommended reference ranges:  Up to 24 hours.............<8.0 mg/dL  Up to 48 hours............<12.0 mg/dL  3-5 days..................<15.0 mg/dL  6-29 days.................<15.0 mg/dL      Alkaline Phosphatase 10/10/2023 158 (H)  55 - 135 U/L Final    AST 10/10/2023 35  10 - 40 U/L Final    ALT 10/10/2023 31  10 - 44 U/L Final    eGFR 10/10/2023 20.3 (A)  >60 mL/min/1.73 m^2 Final    Anion Gap 10/10/2023 17 (H)  8 - 16 mmol/L Final    Troponin I 10/10/2023 0.015  0.000 - 0.026 ng/mL Final    Comment: The reference interval for Troponin I represents the 99th percentile   cutoff   for our facility and is consistent with 3rd generation assay   performance.      BNP 10/10/2023 336 (H)  0 - 99 pg/mL Final    Values of less than 100 pg/ml are consistent with non-CHF populations.    TSH 10/10/2023 1.739  0.400 - 4.000 uIU/mL Final    POCT Glucose 10/10/2023 72  70 - 110 mg/dL Final   Admission on 09/27/2023, Discharged on 09/28/2023   Component Date Value Ref Range Status    WBC 09/27/2023 7.18  3.90 - 12.70 K/uL Final    RBC 09/27/2023 4.17 (L)  4.60 - 6.20 M/uL Final    Hemoglobin 09/27/2023 12.1 (L)  14.0 - 18.0 g/dL Final    Hematocrit 09/27/2023 38.0 (L)  40.0 - 54.0 % Final    MCV 09/27/2023 91  82 - 98 fL Final    MCH 09/27/2023 29.0  27.0 - 31.0 pg Final    MCHC 09/27/2023 31.8 (L)  32.0 - 36.0 g/dL Final     RDW 09/27/2023 14.3  11.5 - 14.5 % Final    Platelets 09/27/2023 234  150 - 450 K/uL Final    MPV 09/27/2023 10.6  9.2 - 12.9 fL Final    Immature Granulocytes 09/27/2023 0.3  0.0 - 0.5 % Final    Gran # (ANC) 09/27/2023 5.0  1.8 - 7.7 K/uL Final    Immature Grans (Abs) 09/27/2023 0.02  0.00 - 0.04 K/uL Final    Comment: Mild elevation in immature granulocytes is non specific and   can be seen in a variety of conditions including stress response,   acute inflammation, trauma and pregnancy. Correlation with other   laboratory and clinical findings is essential.      Lymph # 09/27/2023 1.8  1.0 - 4.8 K/uL Final    Mono # 09/27/2023 0.3  0.3 - 1.0 K/uL Final    Eos # 09/27/2023 0.1  0.0 - 0.5 K/uL Final    Baso # 09/27/2023 0.03  0.00 - 0.20 K/uL Final    nRBC 09/27/2023 0  0 /100 WBC Final    Gran % 09/27/2023 69.5  38.0 - 73.0 % Final    Lymph % 09/27/2023 24.4  18.0 - 48.0 % Final    Mono % 09/27/2023 4.3  4.0 - 15.0 % Final    Eosinophil % 09/27/2023 1.1  0.0 - 8.0 % Final    Basophil % 09/27/2023 0.4  0.0 - 1.9 % Final    Differential Method 09/27/2023 Automated   Final    Sodium 09/27/2023 140  136 - 145 mmol/L Final    Potassium 09/27/2023 3.4 (L)  3.5 - 5.1 mmol/L Final    Chloride 09/27/2023 103  95 - 110 mmol/L Final    CO2 09/27/2023 21 (L)  23 - 29 mmol/L Final    Glucose 09/27/2023 110  70 - 110 mg/dL Final    BUN 09/27/2023 49 (H)  8 - 23 mg/dL Final    Creatinine 09/27/2023 2.8 (H)  0.5 - 1.4 mg/dL Final    Calcium 09/27/2023 8.5 (L)  8.7 - 10.5 mg/dL Final    Total Protein 09/27/2023 7.3  6.0 - 8.4 g/dL Final    Albumin 09/27/2023 3.5  3.5 - 5.2 g/dL Final    Total Bilirubin 09/27/2023 0.6  0.1 - 1.0 mg/dL Final    Comment: For infants and newborns, interpretation of results should be based  on gestational age, weight and in agreement with clinical  observations.    Premature Infant recommended reference ranges:  Up to 24 hours.............<8.0 mg/dL  Up to 48 hours............<12.0 mg/dL  3-5  days..................<15.0 mg/dL  6-29 days.................<15.0 mg/dL      Alkaline Phosphatase 09/27/2023 146 (H)  55 - 135 U/L Final    AST 09/27/2023 50 (H)  10 - 40 U/L Final    ALT 09/27/2023 41  10 - 44 U/L Final    eGFR 09/27/2023 23.0 (A)  >60 mL/min/1.73 m^2 Final    Anion Gap 09/27/2023 16  8 - 16 mmol/L Final    Troponin I 09/27/2023 0.047 (H)  0.000 - 0.026 ng/mL Final    Comment: The reference interval for Troponin I represents the 99th percentile   cutoff   for our facility and is consistent with 3rd generation assay   performance.      BNP 09/27/2023 382 (H)  0 - 99 pg/mL Final    Values of less than 100 pg/ml are consistent with non-CHF populations.    Specimen UA 09/28/2023 Urine, Clean Catch   Final    Color, UA 09/28/2023 Yellow  Yellow, Straw, Stacy Final    Appearance, UA 09/28/2023 Clear  Clear Final    pH, UA 09/28/2023 5.0  5.0 - 8.0 Final    Specific Gravity, UA 09/28/2023 1.010  1.005 - 1.030 Final    Protein, UA 09/28/2023 Trace (A)  Negative Final    Comment: Recommend a 24 hour urine protein or a urine   protein/creatinine ratio if globulin induced proteinuria is  clinically suspected.      Glucose, UA 09/28/2023 2+ (A)  Negative Final    Ketones, UA 09/28/2023 Negative  Negative Final    Bilirubin (UA) 09/28/2023 Negative  Negative Final    Occult Blood UA 09/28/2023 Negative  Negative Final    Nitrite, UA 09/28/2023 Negative  Negative Final    Urobilinogen, UA 09/28/2023 Negative  Negative EU/dL Final    Leukocytes, UA 09/28/2023 Negative  Negative Final    Prothrombin Time 09/27/2023 11.4  9.0 - 12.5 sec Final    INR 09/27/2023 1.1  0.8 - 1.2 Final    Comment: Coumadin Therapy:  2.0 - 3.0 for INR for all indicators except mechanical heart valves  and antiphospholipid syndromes which should use 2.5 - 3.5.  LOT^040^PT Inn^[id number]      aPTT 09/27/2023 24.8  21.0 - 32.0 sec Final    Comment: Refer to local heparin nomogram for intensity/dose specific   therapeutic   range.  LOT^050^APTT  FSL^[id number]      BSA 09/28/2023 2.25  m2 Final    LVIDd 09/28/2023 4.49  3.5 - 6.0 cm Final    LV Systolic Volume 09/28/2023 28.26  mL Final    LV Systolic Volume Index 09/28/2023 12.6  mL/m2 Final    LVIDs 09/28/2023 2.75  2.1 - 4.0 cm Final    LV Diastolic Volume 09/28/2023 91.75  mL Final    LV Diastolic Volume Index 09/28/2023 40.96  mL/m2 Final    IVS 09/28/2023 1.20 (A)  0.6 - 1.1 cm Final    LVOT diameter 09/28/2023 2.17  cm Final    LVOT area 09/28/2023 3.7  cm2 Final    FS 09/28/2023 39  28 - 44 % Final    Left Ventricle Relative Wall Thick* 09/28/2023 0.59  cm Final    Posterior Wall 09/28/2023 1.33 (A)  0.6 - 1.1 cm Final    LV mass 09/28/2023 213.12  g Final    LV Mass Index 09/28/2023 95  g/m2 Final    MV Peak E Joao 09/28/2023 0.54  m/s Final    TDI LATERAL 09/28/2023 0.04  m/s Final    TDI SEPTAL 09/28/2023 0.07  m/s Final    E/E' ratio 09/28/2023 9.82  m/s Final    MV Peak A Joao 09/28/2023 0.91  m/s Final    E/A ratio 09/28/2023 0.59   Final    E wave deceleration time 09/28/2023 144.49  msec Final    LV SEPTAL E/E' RATIO 09/28/2023 7.71  m/s Final    LV LATERAL E/E' RATIO 09/28/2023 13.50  m/s Final    LVOT peak joao 09/28/2023 1.08  m/s Final    LA size 09/28/2023 3.15  cm Final    RVDD 09/28/2023 3.04  cm Final    AV peak gradient 09/28/2023 5  mmHg Final    Ao peak joao 09/28/2023 1.12  m/s Final    AV Velocity Ratio 09/28/2023 0.96   Final    KJ by Velocity Ratio 09/28/2023 3.56  cm² Final    MV stenosis pressure 1/2 time 09/28/2023 41.90  ms Final    MV valve area p 1/2 method 09/28/2023 5.25  cm2 Final    PV PEAK VELOCITY 09/28/2023 1.33  m/s Final    PV peak gradient 09/28/2023 7  mmHg Final    Ao root annulus 09/28/2023 4.14  cm Final    IVC diameter 09/28/2023 1.55  cm Final    Mean e' 09/28/2023 0.06  m/s Final    ZLVIDS 09/28/2023 -4.49   Final    ZLVIDD 09/28/2023 -5.79   Final    AORTIC VALVE CUSP SEPERATION 09/28/2023 2.62  cm Final    Est. RA pres 09/28/2023 3  mmHg Final    EF  09/28/2023 60  % Final    Cholesterol 09/27/2023 161  120 - 199 mg/dL Final    Comment: The National Cholesterol Education Program (NCEP) has set the  following guidelines (reference ranges) for Cholesterol:  Optimal.....................<200 mg/dL  Borderline High.............200-239 mg/dL  High........................> or = 240 mg/dL      Triglycerides 09/27/2023 164 (H)  30 - 150 mg/dL Final    Comment: The National Cholesterol Education Program (NCEP) has set the  following guidelines (reference values) for triglycerides:  Normal......................<150 mg/dL  Borderline High.............150-199 mg/dL  High........................200-499 mg/dL      HDL 09/27/2023 36 (L)  40 - 75 mg/dL Final    Comment: The National Cholesterol Education Program (NCEP) has set the  following guidelines (reference values) for HDL Cholesterol:  Low...............<40 mg/dL  Optimal...........>60 mg/dL      LDL Cholesterol 09/27/2023 92.2  63.0 - 159.0 mg/dL Final    Comment: The National Cholesterol Education Program (NCEP) has set the  following guidelines (reference values) for LDL Cholesterol:  Optimal.......................<130 mg/dL  Borderline High...............130-159 mg/dL  High..........................160-189 mg/dL  Very High.....................>190 mg/dL      HDL/Cholesterol Ratio 09/27/2023 22.4  20.0 - 50.0 % Final    Total Cholesterol/HDL Ratio 09/27/2023 4.5  2.0 - 5.0 Final    Non-HDL Cholesterol 09/27/2023 125  mg/dL Final    Comment: Risk category and Non-HDL cholesterol goals:  Coronary heart disease (CHD)or equivalent (10-year risk of CHD >20%):  Non-HDL cholesterol goal     <130 mg/dL  Two or more CHD risk factors and 10-year risk of CHD <= 20%:  Non-HDL cholesterol goal     <160 mg/dL  0 to 1 CHD risk factor:  Non-HDL cholesterol goal     <190 mg/dL      TSH 09/27/2023 1.720  0.400 - 4.000 uIU/mL Final    Magnesium 09/27/2023 1.2 (L)  1.6 - 2.6 mg/dL Final    Hemoglobin A1C 09/27/2023 6.8 (H)  4.0 - 5.6 %  Final    Comment: ADA Screening Guidelines:  5.7-6.4%  Consistent with prediabetes  >or=6.5%  Consistent with diabetes    High levels of fetal hemoglobin interfere with the HbA1C  assay. Heterozygous hemoglobin variants (HbS, HgC, etc)do  not significantly interfere with this assay.   However, presence of multiple variants may affect accuracy.      Estimated Avg Glucose 09/27/2023 148 (H)  68 - 131 mg/dL Final    POCT Glucose 09/27/2023 56 (L)  70 - 110 mg/dL Final    POCT Glucose 09/27/2023 98  70 - 110 mg/dL Final    Troponin I 09/28/2023 0.042 (H)  0.000 - 0.026 ng/mL Final    Comment: The reference interval for Troponin I represents the 99th percentile   cutoff   for our facility and is consistent with 3rd generation assay   performance.      POCT Glucose 09/28/2023 144 (H)  70 - 110 mg/dL Final    WBC 09/28/2023 9.33  3.90 - 12.70 K/uL Final    RBC 09/28/2023 4.22 (L)  4.60 - 6.20 M/uL Final    Hemoglobin 09/28/2023 12.2 (L)  14.0 - 18.0 g/dL Final    Hematocrit 09/28/2023 39.0 (L)  40.0 - 54.0 % Final    MCV 09/28/2023 92  82 - 98 fL Final    MCH 09/28/2023 28.9  27.0 - 31.0 pg Final    MCHC 09/28/2023 31.3 (L)  32.0 - 36.0 g/dL Final    RDW 09/28/2023 14.4  11.5 - 14.5 % Final    Platelets 09/28/2023 226  150 - 450 K/uL Final    MPV 09/28/2023 11.2  9.2 - 12.9 fL Final    Immature Granulocytes 09/28/2023 0.2  0.0 - 0.5 % Final    Gran # (ANC) 09/28/2023 6.7  1.8 - 7.7 K/uL Final    Immature Grans (Abs) 09/28/2023 0.02  0.00 - 0.04 K/uL Final    Comment: Mild elevation in immature granulocytes is non specific and   can be seen in a variety of conditions including stress response,   acute inflammation, trauma and pregnancy. Correlation with other   laboratory and clinical findings is essential.      Lymph # 09/28/2023 2.0  1.0 - 4.8 K/uL Final    Mono # 09/28/2023 0.5  0.3 - 1.0 K/uL Final    Eos # 09/28/2023 0.1  0.0 - 0.5 K/uL Final    Baso # 09/28/2023 0.03  0.00 - 0.20 K/uL Final    nRBC 09/28/2023 0  0  /100 WBC Final    Gran % 09/28/2023 71.8  38.0 - 73.0 % Final    Lymph % 09/28/2023 21.8  18.0 - 48.0 % Final    Mono % 09/28/2023 5.0  4.0 - 15.0 % Final    Eosinophil % 09/28/2023 0.9  0.0 - 8.0 % Final    Basophil % 09/28/2023 0.3  0.0 - 1.9 % Final    Differential Method 09/28/2023 Automated   Final    Phosphorus 09/28/2023 3.9  2.7 - 4.5 mg/dL Final    Troponin I 09/28/2023 0.039 (H)  0.000 - 0.026 ng/mL Final    Comment: The reference interval for Troponin I represents the 99th percentile   cutoff   for our facility and is consistent with 3rd generation assay   performance.      Magnesium 09/28/2023 3.0 (H)  1.6 - 2.6 mg/dL Final    Magnesium 09/28/2023 2.3  1.6 - 2.6 mg/dL Final    Sodium 09/28/2023 141  136 - 145 mmol/L Final    Potassium 09/28/2023 3.5  3.5 - 5.1 mmol/L Final    Chloride 09/28/2023 107  95 - 110 mmol/L Final    CO2 09/28/2023 19 (L)  23 - 29 mmol/L Final    Glucose 09/28/2023 113 (H)  70 - 110 mg/dL Final    BUN 09/28/2023 44 (H)  8 - 23 mg/dL Final    Creatinine 09/28/2023 2.5 (H)  0.5 - 1.4 mg/dL Final    Calcium 09/28/2023 8.7  8.7 - 10.5 mg/dL Final    Total Protein 09/28/2023 7.0  6.0 - 8.4 g/dL Final    Albumin 09/28/2023 3.3 (L)  3.5 - 5.2 g/dL Final    Total Bilirubin 09/28/2023 0.4  0.1 - 1.0 mg/dL Final    Comment: For infants and newborns, interpretation of results should be based  on gestational age, weight and in agreement with clinical  observations.    Premature Infant recommended reference ranges:  Up to 24 hours.............<8.0 mg/dL  Up to 48 hours............<12.0 mg/dL  3-5 days..................<15.0 mg/dL  6-29 days.................<15.0 mg/dL      Alkaline Phosphatase 09/28/2023 136 (H)  55 - 135 U/L Final    AST 09/28/2023 39  10 - 40 U/L Final    ALT 09/28/2023 37  10 - 44 U/L Final    eGFR 09/28/2023 26.3 (A)  >60 mL/min/1.73 m^2 Final    Anion Gap 09/28/2023 15  8 - 16 mmol/L Final    POCT Glucose 09/28/2023 152 (H)  70 - 110 mg/dL Final    POCT Glucose  09/28/2023 160 (H)  70 - 110 mg/dL Final   Admission on 09/09/2023, Discharged on 09/09/2023   Component Date Value Ref Range Status    WBC 09/09/2023 8.66  3.90 - 12.70 K/uL Final    RBC 09/09/2023 4.21 (L)  4.60 - 6.20 M/uL Final    Hemoglobin 09/09/2023 12.1 (L)  14.0 - 18.0 g/dL Final    Hematocrit 09/09/2023 38.4 (L)  40.0 - 54.0 % Final    MCV 09/09/2023 91  82 - 98 fL Final    MCH 09/09/2023 28.7  27.0 - 31.0 pg Final    MCHC 09/09/2023 31.5 (L)  32.0 - 36.0 g/dL Final    RDW 09/09/2023 14.8 (H)  11.5 - 14.5 % Final    Platelets 09/09/2023 236  150 - 450 K/uL Final    MPV 09/09/2023 10.6  9.2 - 12.9 fL Final    Immature Granulocytes 09/09/2023 0.2  0.0 - 0.5 % Final    Gran # (ANC) 09/09/2023 6.0  1.8 - 7.7 K/uL Final    Immature Grans (Abs) 09/09/2023 0.02  0.00 - 0.04 K/uL Final    Comment: Mild elevation in immature granulocytes is non specific and   can be seen in a variety of conditions including stress response,   acute inflammation, trauma and pregnancy. Correlation with other   laboratory and clinical findings is essential.      Lymph # 09/09/2023 2.0  1.0 - 4.8 K/uL Final    Mono # 09/09/2023 0.5  0.3 - 1.0 K/uL Final    Eos # 09/09/2023 0.2  0.0 - 0.5 K/uL Final    Baso # 09/09/2023 0.04  0.00 - 0.20 K/uL Final    nRBC 09/09/2023 0  0 /100 WBC Final    Gran % 09/09/2023 68.9  38.0 - 73.0 % Final    Lymph % 09/09/2023 22.7  18.0 - 48.0 % Final    Mono % 09/09/2023 5.7  4.0 - 15.0 % Final    Eosinophil % 09/09/2023 2.0  0.0 - 8.0 % Final    Basophil % 09/09/2023 0.5  0.0 - 1.9 % Final    Differential Method 09/09/2023 Automated   Final    Sodium 09/09/2023 142  136 - 145 mmol/L Final    Potassium 09/09/2023 3.7  3.5 - 5.1 mmol/L Final    Chloride 09/09/2023 106  95 - 110 mmol/L Final    CO2 09/09/2023 20 (L)  23 - 29 mmol/L Final    Glucose 09/09/2023 132 (H)  70 - 110 mg/dL Final    BUN 09/09/2023 38 (H)  8 - 23 mg/dL Final    Creatinine 09/09/2023 3.1 (H)  0.5 - 1.4 mg/dL Final    Calcium 09/09/2023  8.9  8.7 - 10.5 mg/dL Final    Total Protein 09/09/2023 7.3  6.0 - 8.4 g/dL Final    Albumin 09/09/2023 3.5  3.5 - 5.2 g/dL Final    Total Bilirubin 09/09/2023 0.8  0.1 - 1.0 mg/dL Final    Comment: For infants and newborns, interpretation of results should be based  on gestational age, weight and in agreement with clinical  observations.    Premature Infant recommended reference ranges:  Up to 24 hours.............<8.0 mg/dL  Up to 48 hours............<12.0 mg/dL  3-5 days..................<15.0 mg/dL  6-29 days.................<15.0 mg/dL      Alkaline Phosphatase 09/09/2023 131  55 - 135 U/L Final    AST 09/09/2023 28  10 - 40 U/L Final    ALT 09/09/2023 23  10 - 44 U/L Final    eGFR 09/09/2023 20.3 (A)  >60 mL/min/1.73 m^2 Final    Anion Gap 09/09/2023 16  8 - 16 mmol/L Final    Troponin I 09/09/2023 0.027 (H)  0.000 - 0.026 ng/mL Final    Comment: The reference interval for Troponin I represents the 99th percentile   cutoff   for our facility and is consistent with 3rd generation assay   performance.      BNP 09/09/2023 183 (H)  0 - 99 pg/mL Final    Values of less than 100 pg/ml are consistent with non-CHF populations.    POC Rapid COVID 09/09/2023 Negative  Negative Final     Acceptable 09/09/2023 Yes   Final    Troponin I 09/09/2023 0.026  0.000 - 0.026 ng/mL Final    Comment: The reference interval for Troponin I represents the 99th percentile   cutoff   for our facility and is consistent with 3rd generation assay   performance.     Lab Visit on 09/08/2023   Component Date Value Ref Range Status    PSA Diagnostic 09/08/2023 0.69  0.00 - 4.00 ng/mL Final    Comment: The testing method is a chemiluminescent microparticle immunoassay   manufactured by [employer] and performed on the    or   "[employer] system. Values obtained with different assay manufacturers   for   methods may be different and cannot be used interchangeably.  PSA Expected levels:  Hormonal Therapy: <0.05  ng/ml  Prostatectomy: <0.01 ng/ml  Radiation Therapy: <1.00 ng/ml     Admission on 08/25/2023, Discharged on 08/25/2023   Component Date Value Ref Range Status    WBC 08/25/2023 7.48  3.90 - 12.70 K/uL Final    RBC 08/25/2023 4.28 (L)  4.60 - 6.20 M/uL Final    Hemoglobin 08/25/2023 12.4 (L)  14.0 - 18.0 g/dL Final    Hematocrit 08/25/2023 39.1 (L)  40.0 - 54.0 % Final    MCV 08/25/2023 91  82 - 98 fL Final    MCH 08/25/2023 29.0  27.0 - 31.0 pg Final    MCHC 08/25/2023 31.7 (L)  32.0 - 36.0 g/dL Final    RDW 08/25/2023 14.9 (H)  11.5 - 14.5 % Final    Platelets 08/25/2023 222  150 - 450 K/uL Final    MPV 08/25/2023 9.9  9.2 - 12.9 fL Final    Immature Granulocytes 08/25/2023 0.4  0.0 - 0.5 % Final    Gran # (ANC) 08/25/2023 5.0  1.8 - 7.7 K/uL Final    Immature Grans (Abs) 08/25/2023 0.03  0.00 - 0.04 K/uL Final    Comment: Mild elevation in immature granulocytes is non specific and   can be seen in a variety of conditions including stress response,   acute inflammation, trauma and pregnancy. Correlation with other   laboratory and clinical findings is essential.      Lymph # 08/25/2023 1.9  1.0 - 4.8 K/uL Final    Mono # 08/25/2023 0.5  0.3 - 1.0 K/uL Final    Eos # 08/25/2023 0.1  0.0 - 0.5 K/uL Final    Baso # 08/25/2023 0.02  0.00 - 0.20 K/uL Final    nRBC 08/25/2023 0  0 /100 WBC Final    Gran % 08/25/2023 66.2  38.0 - 73.0 % Final    Lymph % 08/25/2023 25.1  18.0 - 48.0 % Final    Mono % 08/25/2023 6.1  4.0 - 15.0 % Final    Eosinophil % 08/25/2023 1.9  0.0 - 8.0 % Final    Basophil % 08/25/2023 0.3  0.0 - 1.9 % Final    Differential Method 08/25/2023 Automated   Final    Sodium 08/25/2023 140  136 - 145 mmol/L Final    Potassium 08/25/2023 4.1  3.5 - 5.1 mmol/L Final    Chloride 08/25/2023 105  95 - 110 mmol/L Final    CO2 08/25/2023 22 (L)  23 - 29 mmol/L Final    Glucose 08/25/2023 85  70 - 110 mg/dL Final    BUN 08/25/2023 38 (H)  8 - 23 mg/dL Final    Creatinine 08/25/2023 3.1 (H)  0.5 - 1.4 mg/dL Final     Calcium 08/25/2023 9.4  8.7 - 10.5 mg/dL Final    Total Protein 08/25/2023 7.4  6.0 - 8.4 g/dL Final    Albumin 08/25/2023 3.6  3.5 - 5.2 g/dL Final    Total Bilirubin 08/25/2023 0.6  0.1 - 1.0 mg/dL Final    Comment: For infants and newborns, interpretation of results should be based  on gestational age, weight and in agreement with clinical  observations.    Premature Infant recommended reference ranges:  Up to 24 hours.............<8.0 mg/dL  Up to 48 hours............<12.0 mg/dL  3-5 days..................<15.0 mg/dL  6-29 days.................<15.0 mg/dL      Alkaline Phosphatase 08/25/2023 157 (H)  55 - 135 U/L Final    AST 08/25/2023 30  10 - 40 U/L Final    ALT 08/25/2023 37  10 - 44 U/L Final    eGFR 08/25/2023 20.3 (A)  >60 mL/min/1.73 m^2 Final    Anion Gap 08/25/2023 13  8 - 16 mmol/L Final    Troponin I 08/25/2023 0.017  0.000 - 0.026 ng/mL Final    Comment: The reference interval for Troponin I represents the 99th percentile   cutoff   for our facility and is consistent with 3rd generation assay   performance.      BNP 08/25/2023 258 (H)  0 - 99 pg/mL Final    Values of less than 100 pg/ml are consistent with non-CHF populations.    Specimen UA 08/25/2023 Urine, Clean Catch   Final    Color, UA 08/25/2023 Yellow  Yellow, Straw, Stacy Final    Appearance, UA 08/25/2023 Clear  Clear Final    pH, UA 08/25/2023 5.0  5.0 - 8.0 Final    Specific Gravity, UA 08/25/2023 1.010  1.005 - 1.030 Final    Protein, UA 08/25/2023 Trace (A)  Negative Final    Comment: Recommend a 24 hour urine protein or a urine   protein/creatinine ratio if globulin induced proteinuria is  clinically suspected.      Glucose, UA 08/25/2023 Negative  Negative Final    Ketones, UA 08/25/2023 Negative  Negative Final    Bilirubin (UA) 08/25/2023 Negative  Negative Final    Occult Blood UA 08/25/2023 Negative  Negative Final    Nitrite, UA 08/25/2023 Negative  Negative Final    Urobilinogen, UA 08/25/2023 Negative  Negative EU/dL Final     Leukocytes, UA 08/25/2023 Negative  Negative Final    Magnesium 08/25/2023 1.8  1.6 - 2.6 mg/dL Final   Admission on 08/24/2023, Discharged on 08/24/2023   Component Date Value Ref Range Status    WBC 08/24/2023 6.74  3.90 - 12.70 K/uL Final    RBC 08/24/2023 3.72 (L)  4.60 - 6.20 M/uL Final    Hemoglobin 08/24/2023 10.8 (L)  14.0 - 18.0 g/dL Final    Hematocrit 08/24/2023 33.3 (L)  40.0 - 54.0 % Final    MCV 08/24/2023 90  82 - 98 fL Final    MCH 08/24/2023 29.0  27.0 - 31.0 pg Final    MCHC 08/24/2023 32.4  32.0 - 36.0 g/dL Final    RDW 08/24/2023 14.9 (H)  11.5 - 14.5 % Final    Platelets 08/24/2023 193  150 - 450 K/uL Final    MPV 08/24/2023 10.8  9.2 - 12.9 fL Final    Immature Granulocytes 08/24/2023 0.3  0.0 - 0.5 % Final    Gran # (ANC) 08/24/2023 4.4  1.8 - 7.7 K/uL Final    Immature Grans (Abs) 08/24/2023 0.02  0.00 - 0.04 K/uL Final    Comment: Mild elevation in immature granulocytes is non specific and   can be seen in a variety of conditions including stress response,   acute inflammation, trauma and pregnancy. Correlation with other   laboratory and clinical findings is essential.      Lymph # 08/24/2023 1.8  1.0 - 4.8 K/uL Final    Mono # 08/24/2023 0.4  0.3 - 1.0 K/uL Final    Eos # 08/24/2023 0.2  0.0 - 0.5 K/uL Final    Baso # 08/24/2023 0.03  0.00 - 0.20 K/uL Final    nRBC 08/24/2023 0  0 /100 WBC Final    Gran % 08/24/2023 65.1  38.0 - 73.0 % Final    Lymph % 08/24/2023 26.3  18.0 - 48.0 % Final    Mono % 08/24/2023 5.5  4.0 - 15.0 % Final    Eosinophil % 08/24/2023 2.4  0.0 - 8.0 % Final    Basophil % 08/24/2023 0.4  0.0 - 1.9 % Final    Differential Method 08/24/2023 Automated   Final    Sodium 08/24/2023 137  136 - 145 mmol/L Final    Potassium 08/24/2023 3.6  3.5 - 5.1 mmol/L Final    Chloride 08/24/2023 107  95 - 110 mmol/L Final    CO2 08/24/2023 20 (L)  23 - 29 mmol/L Final    Glucose 08/24/2023 136 (H)  70 - 110 mg/dL Final    BUN 08/24/2023 41 (H)  8 - 23 mg/dL Final    Creatinine  08/24/2023 2.3 (H)  0.5 - 1.4 mg/dL Final    Calcium 08/24/2023 8.6 (L)  8.7 - 10.5 mg/dL Final    Total Protein 08/24/2023 6.2  6.0 - 8.4 g/dL Final    Albumin 08/24/2023 3.0 (L)  3.5 - 5.2 g/dL Final    Total Bilirubin 08/24/2023 0.5  0.1 - 1.0 mg/dL Final    Comment: For infants and newborns, interpretation of results should be based  on gestational age, weight and in agreement with clinical  observations.    Premature Infant recommended reference ranges:  Up to 24 hours.............<8.0 mg/dL  Up to 48 hours............<12.0 mg/dL  3-5 days..................<15.0 mg/dL  6-29 days.................<15.0 mg/dL      Alkaline Phosphatase 08/24/2023 138 (H)  55 - 135 U/L Final    AST 08/24/2023 32  10 - 40 U/L Final    ALT 08/24/2023 36  10 - 44 U/L Final    eGFR 08/24/2023 29.1 (A)  >60 mL/min/1.73 m^2 Final    Anion Gap 08/24/2023 10  8 - 16 mmol/L Final    Troponin I 08/24/2023 0.006  0.000 - 0.026 ng/mL Final    Comment: The reference interval for Troponin I represents the 99th percentile   cutoff   for our facility and is consistent with 3rd generation assay   performance.      BNP 08/24/2023 70  0 - 99 pg/mL Final    Values of less than 100 pg/ml are consistent with non-CHF populations.    Specimen UA 08/24/2023 Urine, Clean Catch   Final    Color, UA 08/24/2023 Colorless (A)  Yellow, Straw, Stacy Final    Appearance, UA 08/24/2023 Clear  Clear Final    pH, UA 08/24/2023 6.0  5.0 - 8.0 Final    Specific Gravity, UA 08/24/2023 <1.005 (A)  1.005 - 1.030 Final    Protein, UA 08/24/2023 Negative  Negative Final    Comment: Recommend a 24 hour urine protein or a urine   protein/creatinine ratio if globulin induced proteinuria is  clinically suspected.      Glucose, UA 08/24/2023 1+ (A)  Negative Final    Ketones, UA 08/24/2023 Negative  Negative Final    Bilirubin (UA) 08/24/2023 Negative  Negative Final    Occult Blood UA 08/24/2023 Negative  Negative Final    Nitrite, UA 08/24/2023 Negative  Negative Final     Urobilinogen, UA 08/24/2023 Negative  Negative EU/dL Final    Leukocytes, UA 08/24/2023 Negative  Negative Final    Magnesium 08/24/2023 1.2 (L)  1.6 - 2.6 mg/dL Final    CPK 08/24/2023 98  20 - 200 U/L Final    Troponin I 08/24/2023 0.007  0.000 - 0.026 ng/mL Final    Comment: The reference interval for Troponin I represents the 99th percentile   cutoff   for our facility and is consistent with 3rd generation assay   performance.      Magnesium 08/24/2023 1.9  1.6 - 2.6 mg/dL Final   There may be more visits with results that are not included.       EKG  EKG 10/2023 sinus rhythm PAC left axis deviation low-voltage QRS right bundle-branch block inferior infarct versus LAFB can not rule out anterior infarct nonspecific ST-T changes    Echo   Results for orders placed or performed during the hospital encounter of 09/27/23   Echo   Result Value Ref Range    BSA 2.25 m2    LVIDd 4.49 3.5 - 6.0 cm    LV Systolic Volume 28.26 mL    LV Systolic Volume Index 12.6 mL/m2    LVIDs 2.75 2.1 - 4.0 cm    LV Diastolic Volume 91.75 mL    LV Diastolic Volume Index 40.96 mL/m2    IVS 1.20 (A) 0.6 - 1.1 cm    LVOT diameter 2.17 cm    LVOT area 3.7 cm2    FS 39 28 - 44 %    Left Ventricle Relative Wall Thickness 0.59 cm    Posterior Wall 1.33 (A) 0.6 - 1.1 cm    LV mass 213.12 g    LV Mass Index 95 g/m2    MV Peak E Joao 0.54 m/s    TDI LATERAL 0.04 m/s    TDI SEPTAL 0.07 m/s    E/E' ratio 9.82 m/s    MV Peak A Joao 0.91 m/s    E/A ratio 0.59     E wave deceleration time 144.49 msec    LV SEPTAL E/E' RATIO 7.71 m/s    LV LATERAL E/E' RATIO 13.50 m/s    LVOT peak joao 1.08 m/s    LA size 3.15 cm    RVDD 3.04 cm    AV peak gradient 5 mmHg    Ao peak joao 1.12 m/s    AV Velocity Ratio 0.96     KJ by Velocity Ratio 3.56 cm²    MV stenosis pressure 1/2 time 41.90 ms    MV valve area p 1/2 method 5.25 cm2    PV PEAK VELOCITY 1.33 m/s    PV peak gradient 7 mmHg    Ao root annulus 4.14 cm    IVC diameter 1.55 cm    Mean e' 0.06 m/s    ZLVIDS  -4.49     ZLVIDD -5.79     AORTIC VALVE CUSP SEPERATION 2.62 cm    Est. RA pres 3 mmHg    EF 60 %    Narrative      Left Ventricle: The left ventricle is normal in size. Mildly increased   wall thickness. There is mild concentric hypertrophy. Normal wall motion.   There is normal systolic function. Ejection fraction by visual   approximation is 60%. Grade I diastolic dysfunction.    Right Ventricle: Normal right ventricular cavity size. Wall thickness   is normal. Right ventricle wall motion  is normal. Systolic function is   normal.         Imaging  No results found.    Prior coronary angiogram / intervention:  See HPI    Assessment and Plan  1. Syncope, orthostatic / autonomic dysfunction  See regimen in HPI  Follow up with Neurology, Nephrology, Endocrinology, Gastroenterology, PCP  Working on prevention of watery stool  PCP has placed the patient on oxybutynin to see if this helps  The patient is getting scheduled IVF infusions, appreciate nephrology assistance  Was on Toprol for VT/NSVT in additional aneurysmal disease can no longer tolerate  Taking midodrine PRN 10 t.i.d. and fludrocortisone daily  Continue current medication regimen for orthostatic hypotension/autonomic dysfunction/hypertension    2. Orthostatic hypotension /autonomic dysfunction  As above    3. Essential hypertension  As above  Patient has labile hypertension with frequent hypotension very complex as in HPI  Continue hydralazine p.r.n. - *will need today as his blood pressure is elevated * -   Toprol would be ideal if tolerated     4. Coronary artery disease involving native coronary artery of native heart without angina pectoris  Continue aspirin 81 and Pravachol 40  Evidently there are allergies to Crestor and Lipitor.  These allergies are unknown.    5. Prolonged QT  Avoid QT prolonging agents     6. Hyperlipidemia, unspecified hyperlipidemia type  Pravastatin as above    7. VT (ventricular tachycardia) +/- pAF?  The patient has had  implanted LINQ device  Beta-blocker would be ideal if tolerated  Do not have enough evidence of/for PAF per electrophysiology  See HPI  Follow up with electrophysiology as he is now established    8. Ascending aortic aneurysm, unspecified whether ruptured  Control hypertension   Beta-blocker would be ideal if tolerated  Serial monitoring difficult with advanced CKD  Followed by Cardiothoracic surgery    9. Carotid stenosis  Not hemodynamically significant  Continue aspirin statin    Total professional time spent for the encounter: 40 minutes  Time was spent preparing to see the patient, reviewing results of prior testing, obtaining and/or reviewing separately obtained history, performing a medically appropriate examination and interview, counseling and educating the patient/family, ordering medications/tests/procedures, referring and communicating with other health care professionals, documenting clinical information in the electronic health record, and independently interpreting results.     Follow Up  1 month      Aj Marrufo MD, FACC, RPVI  Interventional Cardiology

## 2023-11-03 NOTE — PROGRESS NOTES
Pt received 1L NS bolus over 1 hr. Patient tolerated well. Return appointment provided.     Limited head-to-toe assessment due to privacy issues and visit reason though the opportunity was given for patient to express any concerns.

## 2023-11-03 NOTE — PLAN OF CARE
Patient counseled on monitoring and reporting fatigue to provider - verbalized understanding     Patient counseled on infection prevention by excellent hand hygiene - verbalized understanding

## 2023-11-05 DIAGNOSIS — N18.32 TYPE 2 DIABETES MELLITUS WITH STAGE 3B CHRONIC KIDNEY DISEASE, WITHOUT LONG-TERM CURRENT USE OF INSULIN: ICD-10-CM

## 2023-11-05 DIAGNOSIS — E11.22 TYPE 2 DIABETES MELLITUS WITH STAGE 3B CHRONIC KIDNEY DISEASE, WITHOUT LONG-TERM CURRENT USE OF INSULIN: ICD-10-CM

## 2023-11-05 NOTE — TELEPHONE ENCOUNTER
Refill Routing Note   Medication(s) are not appropriate for processing by [facility] for the following reason(s):      Clarification of medication (Rx) details-Need clarification of testing frequency     [facility] action(s):  Defer Care Due:  Labs due-Uric ACID     Medication Therapy Plan: Need clarification of testing frequency      Appointments  past 12m or future 3m with PCP    Date Provider   Last Visit   10/26/2023 Poli Gonzalez MD   Next Visit   1/4/2024 Poli Gonzalez MD   ED visits in past 90 days: 6        Note composed:9:49 AM 11/05/2023

## 2023-11-05 NOTE — TELEPHONE ENCOUNTER
Care Due:                  Date            Visit Type   Department     Provider  --------------------------------------------------------------------------------                                EP -                              PRIMARY [facility]  Last Visit: 10-      CARE ([facility])   PRIMARY CARE   Poli Gonzalez                               -                              PRIMARY      [facility]  Next Visit: 01-      CARE ([facility])   PRIMARY CARE   Poli Gonzalez                                                            Last  Test          Frequency    Reason                     Performed    Due Date  --------------------------------------------------------------------------------    Uric Acid...  12 months..  colchicine,..............  09-   09-    Health [facility] Embedded Care Due Messages. Reference number: [id number].   11/05/2023 3:14:10 AM CST

## 2023-11-06 ENCOUNTER — TELEPHONE (OUTPATIENT)
Dept: PRIMARY CARE CLINIC | Facility: CLINIC | Age: 73
End: 2023-11-06
Payer: MEDICARE

## 2023-11-06 DIAGNOSIS — M54.12 CERVICAL RADICULOPATHY: Primary | ICD-10-CM

## 2023-11-06 RX ORDER — BLOOD SUGAR DIAGNOSTIC
STRIP MISCELLANEOUS 3 TIMES DAILY
Qty: 300 STRIP | Refills: 10 | Status: SHIPPED | OUTPATIENT
Start: 2023-11-06

## 2023-11-06 RX ORDER — ISOPROPYL ALCOHOL 70 ML/100ML
SWAB TOPICAL
Qty: 200 EACH | Refills: 2 | Status: SHIPPED | OUTPATIENT
Start: 2023-11-06 | End: 2024-01-19

## 2023-11-07 ENCOUNTER — CLINICAL SUPPORT (OUTPATIENT)
Dept: CARDIOLOGY | Facility: HOSPITAL | Age: 73
End: 2023-11-07
Attending: INTERNAL MEDICINE
Payer: MEDICARE

## 2023-11-07 ENCOUNTER — CLINICAL SUPPORT (OUTPATIENT)
Dept: CARDIOLOGY | Facility: HOSPITAL | Age: 73
End: 2023-11-07
Payer: MEDICARE

## 2023-11-07 ENCOUNTER — TELEPHONE (OUTPATIENT)
Dept: ELECTROPHYSIOLOGY | Facility: CLINIC | Age: 73
End: 2023-11-07
Payer: MEDICARE

## 2023-11-07 ENCOUNTER — INFUSION (OUTPATIENT)
Dept: INFECTIOUS DISEASES | Facility: HOSPITAL | Age: 73
End: 2023-11-07
Payer: MEDICARE

## 2023-11-07 VITALS
HEART RATE: 81 BPM | BODY MASS INDEX: 26.77 KG/M2 | SYSTOLIC BLOOD PRESSURE: 185 MMHG | RESPIRATION RATE: 18 BRPM | TEMPERATURE: 99 F | WEIGHT: 208.56 LBS | OXYGEN SATURATION: 97 % | DIASTOLIC BLOOD PRESSURE: 93 MMHG | HEIGHT: 74 IN

## 2023-11-07 DIAGNOSIS — Z95.818 PRESENCE OF OTHER CARDIAC IMPLANTS AND GRAFTS: ICD-10-CM

## 2023-11-07 DIAGNOSIS — I95.1 SYNCOPE DUE TO ORTHOSTATIC HYPOTENSION: ICD-10-CM

## 2023-11-07 DIAGNOSIS — E11.22 TYPE 2 DIABETES MELLITUS WITH STAGE 3B CHRONIC KIDNEY DISEASE, WITHOUT LONG-TERM CURRENT USE OF INSULIN: Primary | ICD-10-CM

## 2023-11-07 DIAGNOSIS — N18.32 TYPE 2 DIABETES MELLITUS WITH STAGE 3B CHRONIC KIDNEY DISEASE, WITHOUT LONG-TERM CURRENT USE OF INSULIN: Primary | ICD-10-CM

## 2023-11-07 DIAGNOSIS — Z93.3 COLOSTOMY PRESENT: ICD-10-CM

## 2023-11-07 DIAGNOSIS — E86.0 DEHYDRATION: ICD-10-CM

## 2023-11-07 PROCEDURE — G2066 INTER DEVC REMOTE 30D: HCPCS | Mod: HCNC | Performed by: INTERNAL MEDICINE

## 2023-11-07 PROCEDURE — 93298 CARDIAC DEVICE CHECK CHECK - REMOTE ALERT: ICD-10-PCS | Mod: HCNC,,, | Performed by: INTERNAL MEDICINE

## 2023-11-07 PROCEDURE — 96360 HYDRATION IV INFUSION INIT: CPT | Mod: HCNC

## 2023-11-07 PROCEDURE — 93298 REM INTERROG DEV EVAL SCRMS: CPT | Mod: HCNC,,, | Performed by: INTERNAL MEDICINE

## 2023-11-07 PROCEDURE — 25000003 PHARM REV CODE 250: Mod: HCNC | Performed by: NURSE PRACTITIONER

## 2023-11-07 RX ORDER — HEPARIN 100 UNIT/ML
500 SYRINGE INTRAVENOUS
Status: CANCELLED | OUTPATIENT
Start: 2023-11-10

## 2023-11-07 RX ORDER — SODIUM CHLORIDE 0.9 % (FLUSH) 0.9 %
10 SYRINGE (ML) INJECTION
Status: CANCELLED | OUTPATIENT
Start: 2023-11-10

## 2023-11-07 RX ADMIN — SODIUM CHLORIDE 1000 ML: 9 INJECTION, SOLUTION INTRAVENOUS at 08:11

## 2023-11-07 NOTE — PROGRESS NOTES
Pt arrived infusion suite for NS infusion 1L over 1 hr. Pt tolerated well without difficulty.  Future appt made.     Limited head-to-toe assessment due to privacy issues and visit reason though the opportunity was given for patient to express any concerns.

## 2023-11-07 NOTE — TELEPHONE ENCOUNTER
"Atrial fibrillation noted during device remote check:    Overall burden:  1.3%%    Max duration seen: 52 minutes on 10/26/23.  PVCs noted.  No further AF noted past 10/26/23    Ventricular rates:   Needs improvement.   Multiple tachy episodes, max 2 min 17 sec, c/w AF/RVR.    Anticoagulation status:  NONE    Patient symptoms: He is asymptomatic.  States with neck pain and shoulder pain x 2 wks, on oxycodone.    Has Metoprolol 25mg QD PRN listed on med list, but states Dr. Marrufo told him to "lay off of it for awhile" until seen in December (nothing noted in chart to hold BB).  Does have labile BP issues.                      "

## 2023-11-07 NOTE — TELEPHONE ENCOUNTER
----- Message from Smooth Bonner sent at 11/6/2023 10:43 AM CST -----  Contact: [phone]  [email or web address]     Patient is calling for Medical Advice regarding: pt is having some pain in his neck due to a disk. He is having trouble holding his right hand. Would like to speak with nurse.           
Called and informed him that xray of neck was ordered.  Verbalized understanding.  
Called patient and he informed me that when he picks something up with his right hand , he drops it.  Please advise.    
He informed me that he did not have a stroke and feels fine.  He has had surgery before for his neck.  He feels a cracking sound in lower part of neck.  Discs in his neck were fused and that is what is hurting him.  Please advise.   
Pt probably need to go to ER make sure he does not have stroke since it happened so fast  
We can try neurontin and physical therapy and consult Dr Trina adams he can do something about if medication and physical therapy don't work   
No complaints

## 2023-11-07 NOTE — TELEPHONE ENCOUNTER
----- Message from Mariano Antnuez sent at 11/7/2023  2:25 PM CST -----  Contact: Pt [phone]  Requesting an RX refill or new RX.  Is this a refill or new RX: Refill  RX name and strength fludrocortisone (FLORINEF) 0.1 mg Tab  Is this a 30 day or 90 day RX: 90  Pharmacy name and phone # [facility] - [facility] [address] Phone:  [phone] Fax:  [phone]

## 2023-11-07 NOTE — TELEPHONE ENCOUNTER
No care due was identified.  Health [facility] Embedded Care Due Messages. Reference number: [id number].   11/07/2023 2:34:23 PM CST

## 2023-11-08 ENCOUNTER — TELEPHONE (OUTPATIENT)
Dept: PRIMARY CARE CLINIC | Facility: CLINIC | Age: 73
End: 2023-11-08
Payer: MEDICARE

## 2023-11-08 RX ORDER — FLUDROCORTISONE ACETATE 0.1 MG/1
100 TABLET ORAL DAILY
Qty: 180 TABLET | Refills: 0 | Status: SHIPPED | OUTPATIENT
Start: 2023-11-08 | End: 2024-03-05

## 2023-11-08 NOTE — TELEPHONE ENCOUNTER
Refill Routing Note   Medication(s) are not appropriate for processing by [facility] for the following reason(s):      Medication outside of protocol    [facility] action(s):  Route Care Due:  None identified            Appointments  past 12m or future 3m with PCP    Date Provider   Last Visit   10/26/2023 Poli Gonzalez MD   Next Visit   1/4/2024 Poli Gonzalez MD   ED visits in past 90 days: 5        Note composed:8:39 PM 11/07/2023

## 2023-11-08 NOTE — TELEPHONE ENCOUNTER
----- Message from Jess Silverio sent at 11/8/2023  1:11 PM CST -----  Contact: Patient, [phone]  Calling because he needs a new prescription for ileostomy supplies to go to [facility]. Please advise. Thanks.

## 2023-11-10 ENCOUNTER — INFUSION (OUTPATIENT)
Dept: INFECTIOUS DISEASES | Facility: HOSPITAL | Age: 73
End: 2023-11-10
Payer: MEDICARE

## 2023-11-10 ENCOUNTER — TELEPHONE (OUTPATIENT)
Dept: PRIMARY CARE CLINIC | Facility: CLINIC | Age: 73
End: 2023-11-10
Payer: MEDICARE

## 2023-11-10 VITALS
OXYGEN SATURATION: 96 % | SYSTOLIC BLOOD PRESSURE: 166 MMHG | DIASTOLIC BLOOD PRESSURE: 88 MMHG | RESPIRATION RATE: 20 BRPM | WEIGHT: 213.19 LBS | BODY MASS INDEX: 27.36 KG/M2 | HEIGHT: 74 IN | HEART RATE: 86 BPM | TEMPERATURE: 98 F

## 2023-11-10 DIAGNOSIS — E11.22 TYPE 2 DIABETES MELLITUS WITH STAGE 3B CHRONIC KIDNEY DISEASE, WITHOUT LONG-TERM CURRENT USE OF INSULIN: Primary | ICD-10-CM

## 2023-11-10 DIAGNOSIS — Z93.3 COLOSTOMY PRESENT: ICD-10-CM

## 2023-11-10 DIAGNOSIS — E86.0 DEHYDRATION: ICD-10-CM

## 2023-11-10 DIAGNOSIS — N18.32 TYPE 2 DIABETES MELLITUS WITH STAGE 3B CHRONIC KIDNEY DISEASE, WITHOUT LONG-TERM CURRENT USE OF INSULIN: Primary | ICD-10-CM

## 2023-11-10 PROCEDURE — 96360 HYDRATION IV INFUSION INIT: CPT | Mod: HCNC

## 2023-11-10 PROCEDURE — 25000003 PHARM REV CODE 250: Mod: HCNC | Performed by: NURSE PRACTITIONER

## 2023-11-10 RX ORDER — HEPARIN 100 UNIT/ML
500 SYRINGE INTRAVENOUS
Status: CANCELLED | OUTPATIENT
Start: 2023-11-14

## 2023-11-10 RX ORDER — SODIUM CHLORIDE 0.9 % (FLUSH) 0.9 %
10 SYRINGE (ML) INJECTION
Status: CANCELLED | OUTPATIENT
Start: 2023-11-14

## 2023-11-10 RX ADMIN — SODIUM CHLORIDE 1000 ML: 9 INJECTION, SOLUTION INTRAVENOUS at 08:11

## 2023-11-10 NOTE — TELEPHONE ENCOUNTER
Can you ask Monique or you call pt to get the same orders that the surgeon order for him before or ask pt to call surgeon office

## 2023-11-10 NOTE — TELEPHONE ENCOUNTER
----- Message from Mariano Antunez sent at 11/10/2023  3:01 PM CST -----  Contact: Pt [phone]  Consult    Calling because he needs a new prescription for ileostomy supplies to go to [facility] and they told him to have the doctor send a new script phone # [phone].     Please advise. Thanks.

## 2023-11-12 ENCOUNTER — PATIENT MESSAGE (OUTPATIENT)
Dept: DIABETES | Facility: CLINIC | Age: 73
End: 2023-11-12
Payer: MEDICARE

## 2023-11-13 DIAGNOSIS — I47.20 VT (VENTRICULAR TACHYCARDIA): ICD-10-CM

## 2023-11-13 DIAGNOSIS — I95.1 SYNCOPE DUE TO ORTHOSTATIC HYPOTENSION: ICD-10-CM

## 2023-11-13 RX ORDER — MIDODRINE HYDROCHLORIDE 10 MG/1
10 TABLET ORAL DAILY PRN
Qty: 90 TABLET | Refills: 3 | Status: SHIPPED | OUTPATIENT
Start: 2023-11-13 | End: 2024-02-01 | Stop reason: SDUPTHER

## 2023-11-13 RX ORDER — METOPROLOL SUCCINATE 25 MG/1
25 TABLET, EXTENDED RELEASE ORAL DAILY
Qty: 90 TABLET | Refills: 1 | Status: SHIPPED | OUTPATIENT
Start: 2023-11-13 | End: 2024-02-22

## 2023-11-13 NOTE — TELEPHONE ENCOUNTER
----- Message from Tuloi Machado sent at 11/13/2023 11:47 AM CST -----  Regarding: Refill requested  Contact: [phone]  Rx Refill/Request         Is this a Refill or New Rx:  Refill    Rx Name and Strength:  midodrine (PROAMATINE) 10 MG tablet and Metoprolol 25mg    Preferred Pharmacy with phone number:  [facility] - [place], [place] - [address]  [address] [facility] [address]  Phone: [phone] Fax: [phone]      Communication Preference: [phone]  Additional Information: Pt is asking to spk with the nurse as well.

## 2023-11-14 ENCOUNTER — INFUSION (OUTPATIENT)
Dept: INFECTIOUS DISEASES | Facility: HOSPITAL | Age: 73
End: 2023-11-14
Payer: MEDICARE

## 2023-11-14 VITALS
DIASTOLIC BLOOD PRESSURE: 90 MMHG | TEMPERATURE: 99 F | OXYGEN SATURATION: 97 % | RESPIRATION RATE: 18 BRPM | SYSTOLIC BLOOD PRESSURE: 187 MMHG | HEIGHT: 74 IN | BODY MASS INDEX: 27.37 KG/M2 | HEART RATE: 99 BPM

## 2023-11-14 DIAGNOSIS — N18.32 TYPE 2 DIABETES MELLITUS WITH STAGE 3B CHRONIC KIDNEY DISEASE, WITHOUT LONG-TERM CURRENT USE OF INSULIN: Primary | ICD-10-CM

## 2023-11-14 DIAGNOSIS — Z93.3 COLOSTOMY PRESENT: ICD-10-CM

## 2023-11-14 DIAGNOSIS — E11.22 TYPE 2 DIABETES MELLITUS WITH STAGE 3B CHRONIC KIDNEY DISEASE, WITHOUT LONG-TERM CURRENT USE OF INSULIN: Primary | ICD-10-CM

## 2023-11-14 DIAGNOSIS — E86.0 DEHYDRATION: ICD-10-CM

## 2023-11-14 LAB
OHS CV AF BURDEN PERCENT: 2.5
OHS CV DC REMOTE DEVICE TYPE: NORMAL
OHS CV ICD SHOCK: NO

## 2023-11-14 PROCEDURE — 25000003 PHARM REV CODE 250: Mod: HCNC | Performed by: NURSE PRACTITIONER

## 2023-11-14 PROCEDURE — 96360 HYDRATION IV INFUSION INIT: CPT | Mod: HCNC

## 2023-11-14 RX ORDER — HEPARIN 100 UNIT/ML
500 SYRINGE INTRAVENOUS
Status: CANCELLED | OUTPATIENT
Start: 2023-11-17

## 2023-11-14 RX ORDER — SODIUM CHLORIDE 0.9 % (FLUSH) 0.9 %
10 SYRINGE (ML) INJECTION
Status: CANCELLED | OUTPATIENT
Start: 2023-11-17

## 2023-11-14 RX ADMIN — SODIUM CHLORIDE 1000 ML: 9 INJECTION, SOLUTION INTRAVENOUS at 08:11

## 2023-11-15 ENCOUNTER — TELEPHONE (OUTPATIENT)
Dept: PRIMARY CARE CLINIC | Facility: CLINIC | Age: 73
End: 2023-11-15
Payer: MEDICARE

## 2023-11-15 DIAGNOSIS — M54.12 CERVICAL RADICULOPATHY: Primary | ICD-10-CM

## 2023-11-15 NOTE — TELEPHONE ENCOUNTER
----- Message from Soco Griffin sent at 11/15/2023  2:36 PM CST -----  Contact: [phone] Patient  2TESTRESULTS    Type: Test Results    What test was performed?X Ray Cervical Spine    Who ordered the test? Dr Gonzalez    When and where were the test performed? [facility] 11/08/2023    Would you like response via [employer]: Call Back Patient please. Thank you    Comments:

## 2023-11-16 ENCOUNTER — PATIENT MESSAGE (OUTPATIENT)
Dept: PRIMARY CARE CLINIC | Facility: CLINIC | Age: 73
End: 2023-11-16
Payer: MEDICARE

## 2023-11-17 ENCOUNTER — TELEPHONE (OUTPATIENT)
Dept: CARDIOLOGY | Facility: CLINIC | Age: 73
End: 2023-11-17
Payer: MEDICARE

## 2023-11-17 ENCOUNTER — INFUSION (OUTPATIENT)
Dept: INFECTIOUS DISEASES | Facility: HOSPITAL | Age: 73
End: 2023-11-17
Payer: MEDICARE

## 2023-11-17 VITALS
BODY MASS INDEX: 25.73 KG/M2 | OXYGEN SATURATION: 97 % | RESPIRATION RATE: 20 BRPM | HEIGHT: 74 IN | SYSTOLIC BLOOD PRESSURE: 201 MMHG | DIASTOLIC BLOOD PRESSURE: 90 MMHG | HEART RATE: 62 BPM | TEMPERATURE: 99 F | WEIGHT: 200.5 LBS

## 2023-11-17 DIAGNOSIS — N18.32 TYPE 2 DIABETES MELLITUS WITH STAGE 3B CHRONIC KIDNEY DISEASE, WITHOUT LONG-TERM CURRENT USE OF INSULIN: Primary | ICD-10-CM

## 2023-11-17 DIAGNOSIS — E11.22 TYPE 2 DIABETES MELLITUS WITH STAGE 3B CHRONIC KIDNEY DISEASE, WITHOUT LONG-TERM CURRENT USE OF INSULIN: Primary | ICD-10-CM

## 2023-11-17 DIAGNOSIS — Z93.3 COLOSTOMY PRESENT: ICD-10-CM

## 2023-11-17 DIAGNOSIS — E86.0 DEHYDRATION: ICD-10-CM

## 2023-11-17 PROCEDURE — 96360 HYDRATION IV INFUSION INIT: CPT | Mod: HCNC

## 2023-11-17 PROCEDURE — 25000003 PHARM REV CODE 250: Mod: HCNC | Performed by: NURSE PRACTITIONER

## 2023-11-17 RX ORDER — SODIUM CHLORIDE 0.9 % (FLUSH) 0.9 %
10 SYRINGE (ML) INJECTION
Status: CANCELLED | OUTPATIENT
Start: 2023-11-21

## 2023-11-17 RX ORDER — HEPARIN 100 UNIT/ML
500 SYRINGE INTRAVENOUS
Status: CANCELLED | OUTPATIENT
Start: 2023-11-21

## 2023-11-17 RX ADMIN — SODIUM CHLORIDE 1000 ML: 9 INJECTION, SOLUTION INTRAVENOUS at 08:11

## 2023-11-17 NOTE — TELEPHONE ENCOUNTER
Did we take care of this let pt know see DR Mena first he is neurosurgeon he can help pt with the CS problems

## 2023-11-17 NOTE — TELEPHONE ENCOUNTER
Dr Mena is NS he can treat his neck he does injections etc and he is in [place] if not let me know he have to refer pt to main campus no other NS in [place]

## 2023-11-17 NOTE — TELEPHONE ENCOUNTER
LVM for patient regarding provider message and recommendations.  If you have any questions, my call back is [phone].

## 2023-11-17 NOTE — TELEPHONE ENCOUNTER
----- Message from Margaret Morales sent at 11/17/2023 12:18 PM CST -----  Contact: pt @ [phone]  PEGGY JOHNSON calling regarding Patient Advice (message) for #pt is calling to speak with someone in office concerning his blood pressure. Asking for call back

## 2023-11-17 NOTE — TELEPHONE ENCOUNTER
Next appt 12/28/2023  LOV 11/03/2023    Spoke with patient, went for infusion this AM, his BP was 215/110, took pills at 4:00 this morning and still high when went for infusion.  Says they contacted you this morning.  He took his pressure when he got home, 11:00 /87.  Patient states its taking too long for BP to come down with the medications and is asking for guidance.

## 2023-11-17 NOTE — PROGRESS NOTES
Patient arrived to infusion suite for 1L NS bolus over 1 hr. Patient blood pressure was 201/94, patient stated he took his blood pressure medication Hydralazine this morning at 5 am. Patient is asymptomatic. Sent a message to his Cardiologist Dr. Marrufo, and notified him of his blood pressure and his NS infusion scheduled this morning. Dr. Marrufo, message the writer back, and stated it is OKAY for patient to received his NS infusion today, and instruct the patient to call Dr. Marrufo's office today and talked to his nurse after checking his blood pressure when he returns home today. Dr. Marrufo, stated he may need to take extra Hydralazine at home and hold his Midodrine unless his blood pressure goes way down. Spoke to patient and relay Dr. Marrufo's instructions.  Patient acknowledge with good return verbal demonstration.    Patient tolerated infusion well. Return appointment provided.     Limited head-to-toe assessment due to privacy issues and visit reason though the opportunity was given for patient to express any concerns.

## 2023-11-20 ENCOUNTER — TELEPHONE (OUTPATIENT)
Dept: PAIN MEDICINE | Facility: CLINIC | Age: 73
End: 2023-11-20
Payer: MEDICARE

## 2023-11-20 ENCOUNTER — TELEPHONE (OUTPATIENT)
Dept: CARDIOLOGY | Facility: CLINIC | Age: 73
End: 2023-11-20
Payer: MEDICARE

## 2023-11-20 NOTE — TELEPHONE ENCOUNTER
----- Message from Kelly Sorto sent at 11/20/2023  8:37 AM CST -----  DR Gonzalez would like patient to be sooner then January. If so please call patient back with appointment

## 2023-11-20 NOTE — TELEPHONE ENCOUNTER
Spoke with patient. He was informed Dr Gonzalez's office contacted and requested for a sooner appointment with Dr Mena. Discussed rescheduling the appointment for 12/28. Patient agreed to move the appointment for that date before his cardiology appointment. No further issues discussed.

## 2023-11-20 NOTE — TELEPHONE ENCOUNTER
Next appt 12/28/2023  LOV 11/03/2023    Spoke with patient, update on home blood pressure readings.    Sunday 4:00 AM BP 96/65 took two midodrine  Mon 9:35 AM BP 96/64 took two more midodrine  Today 11:00 AM /76  Outside in yard, at 1:30 PM reports dizziness BP 89/54 P 61    Patient also reports that last ER provider recommended endocrinologist.  He has appointment scheduled in March 2024; asked if I could try for an earlier appointment.

## 2023-11-20 NOTE — TELEPHONE ENCOUNTER
----- Message from Martha Gonzalez sent at 11/20/2023  9:19 AM CST -----  Regarding: update on bp readings  Contact: Jarrett  Regarding: Speak to staff        Name Of Caller: Jarrett        Contact Preference: [phone] (home)          Nature of call:  pt is calling to speak to staff regarding an update on his B P readings. Requesting a call back.

## 2023-11-21 ENCOUNTER — OFFICE VISIT (OUTPATIENT)
Dept: ENDOCRINOLOGY | Facility: CLINIC | Age: 73
End: 2023-11-21
Payer: MEDICARE

## 2023-11-21 ENCOUNTER — INFUSION (OUTPATIENT)
Dept: INFECTIOUS DISEASES | Facility: HOSPITAL | Age: 73
End: 2023-11-21
Payer: MEDICARE

## 2023-11-21 VITALS
HEIGHT: 74 IN | HEART RATE: 77 BPM | BODY MASS INDEX: 27.4 KG/M2 | DIASTOLIC BLOOD PRESSURE: 95 MMHG | OXYGEN SATURATION: 98 % | SYSTOLIC BLOOD PRESSURE: 180 MMHG | OXYGEN SATURATION: 99 % | WEIGHT: 213.5 LBS | BODY MASS INDEX: 27.57 KG/M2 | DIASTOLIC BLOOD PRESSURE: 94 MMHG | RESPIRATION RATE: 16 BRPM | WEIGHT: 214.75 LBS | HEART RATE: 77 BPM | SYSTOLIC BLOOD PRESSURE: 188 MMHG | TEMPERATURE: 98 F

## 2023-11-21 DIAGNOSIS — N18.32 TYPE 2 DIABETES MELLITUS WITH STAGE 3B CHRONIC KIDNEY DISEASE, WITHOUT LONG-TERM CURRENT USE OF INSULIN: Primary | ICD-10-CM

## 2023-11-21 DIAGNOSIS — Z93.3 COLOSTOMY PRESENT: ICD-10-CM

## 2023-11-21 DIAGNOSIS — E11.22 TYPE 2 DIABETES MELLITUS WITH STAGE 3B CHRONIC KIDNEY DISEASE, WITHOUT LONG-TERM CURRENT USE OF INSULIN: Primary | ICD-10-CM

## 2023-11-21 DIAGNOSIS — E78.2 MIXED HYPERLIPIDEMIA: ICD-10-CM

## 2023-11-21 DIAGNOSIS — N18.31 TYPE 2 DIABETES MELLITUS WITH STAGE 3A CHRONIC KIDNEY DISEASE, WITHOUT LONG-TERM CURRENT USE OF INSULIN: Primary | ICD-10-CM

## 2023-11-21 DIAGNOSIS — E86.0 DEHYDRATION: ICD-10-CM

## 2023-11-21 DIAGNOSIS — E11.22 TYPE 2 DIABETES MELLITUS WITH STAGE 3A CHRONIC KIDNEY DISEASE, WITHOUT LONG-TERM CURRENT USE OF INSULIN: Primary | ICD-10-CM

## 2023-11-21 PROCEDURE — 3060F PR POS MICROALBUMINURIA RESULT DOCUMENTED/REVIEW: ICD-10-PCS | Mod: CPTII,S$GLB,, | Performed by: INTERNAL MEDICINE

## 2023-11-21 PROCEDURE — 96365 THER/PROPH/DIAG IV INF INIT: CPT | Mod: HCNC

## 2023-11-21 PROCEDURE — 99204 PR OFFICE/OUTPT VISIT, NEW, LEVL IV, 45-59 MIN: ICD-10-PCS | Mod: S$GLB,,, | Performed by: INTERNAL MEDICINE

## 2023-11-21 PROCEDURE — 3077F SYST BP >= 140 MM HG: CPT | Mod: CPTII,S$GLB,, | Performed by: INTERNAL MEDICINE

## 2023-11-21 PROCEDURE — 3080F PR MOST RECENT DIASTOLIC BLOOD PRESSURE >= 90 MM HG: ICD-10-PCS | Mod: CPTII,S$GLB,, | Performed by: INTERNAL MEDICINE

## 2023-11-21 PROCEDURE — 3066F NEPHROPATHY DOC TX: CPT | Mod: CPTII,S$GLB,, | Performed by: INTERNAL MEDICINE

## 2023-11-21 PROCEDURE — 3288F PR FALLS RISK ASSESSMENT DOCUMENTED: ICD-10-PCS | Mod: CPTII,S$GLB,, | Performed by: INTERNAL MEDICINE

## 2023-11-21 PROCEDURE — 3044F PR MOST RECENT HEMOGLOBIN A1C LEVEL <7.0%: ICD-10-PCS | Mod: CPTII,S$GLB,, | Performed by: INTERNAL MEDICINE

## 2023-11-21 PROCEDURE — 3080F DIAST BP >= 90 MM HG: CPT | Mod: CPTII,S$GLB,, | Performed by: INTERNAL MEDICINE

## 2023-11-21 PROCEDURE — 3066F PR DOCUMENTATION OF TREATMENT FOR NEPHROPATHY: ICD-10-PCS | Mod: CPTII,S$GLB,, | Performed by: INTERNAL MEDICINE

## 2023-11-21 PROCEDURE — 25000003 PHARM REV CODE 250: Mod: HCNC | Performed by: NURSE PRACTITIONER

## 2023-11-21 PROCEDURE — 3077F PR MOST RECENT SYSTOLIC BLOOD PRESSURE >= 140 MM HG: ICD-10-PCS | Mod: CPTII,S$GLB,, | Performed by: INTERNAL MEDICINE

## 2023-11-21 PROCEDURE — 1101F PT FALLS ASSESS-DOCD LE1/YR: CPT | Mod: CPTII,S$GLB,, | Performed by: INTERNAL MEDICINE

## 2023-11-21 PROCEDURE — 1159F PR MEDICATION LIST DOCUMENTED IN MEDICAL RECORD: ICD-10-PCS | Mod: CPTII,S$GLB,, | Performed by: INTERNAL MEDICINE

## 2023-11-21 PROCEDURE — 3008F PR BODY MASS INDEX (BMI) DOCUMENTED: ICD-10-PCS | Mod: CPTII,S$GLB,, | Performed by: INTERNAL MEDICINE

## 2023-11-21 PROCEDURE — 3288F FALL RISK ASSESSMENT DOCD: CPT | Mod: CPTII,S$GLB,, | Performed by: INTERNAL MEDICINE

## 2023-11-21 PROCEDURE — 3008F BODY MASS INDEX DOCD: CPT | Mod: CPTII,S$GLB,, | Performed by: INTERNAL MEDICINE

## 2023-11-21 PROCEDURE — 99204 OFFICE O/P NEW MOD 45 MIN: CPT | Mod: S$GLB,,, | Performed by: INTERNAL MEDICINE

## 2023-11-21 PROCEDURE — 1160F RVW MEDS BY RX/DR IN RCRD: CPT | Mod: CPTII,S$GLB,, | Performed by: INTERNAL MEDICINE

## 2023-11-21 PROCEDURE — 99999 PR PBB SHADOW E&M-EST. PATIENT-LVL V: ICD-10-PCS | Mod: PBBFAC,,, | Performed by: INTERNAL MEDICINE

## 2023-11-21 PROCEDURE — 1101F PR PT FALLS ASSESS DOC 0-1 FALLS W/OUT INJ PAST YR: ICD-10-PCS | Mod: CPTII,S$GLB,, | Performed by: INTERNAL MEDICINE

## 2023-11-21 PROCEDURE — 3044F HG A1C LEVEL LT 7.0%: CPT | Mod: CPTII,S$GLB,, | Performed by: INTERNAL MEDICINE

## 2023-11-21 PROCEDURE — 1126F AMNT PAIN NOTED NONE PRSNT: CPT | Mod: CPTII,S$GLB,, | Performed by: INTERNAL MEDICINE

## 2023-11-21 PROCEDURE — 99999 PR PBB SHADOW E&M-EST. PATIENT-LVL V: CPT | Mod: PBBFAC,,, | Performed by: INTERNAL MEDICINE

## 2023-11-21 PROCEDURE — 3060F POS MICROALBUMINURIA REV: CPT | Mod: CPTII,S$GLB,, | Performed by: INTERNAL MEDICINE

## 2023-11-21 PROCEDURE — 1126F PR PAIN SEVERITY QUANTIFIED, NO PAIN PRESENT: ICD-10-PCS | Mod: CPTII,S$GLB,, | Performed by: INTERNAL MEDICINE

## 2023-11-21 PROCEDURE — 1160F PR REVIEW ALL MEDS BY PRESCRIBER/CLIN PHARMACIST DOCUMENTED: ICD-10-PCS | Mod: CPTII,S$GLB,, | Performed by: INTERNAL MEDICINE

## 2023-11-21 PROCEDURE — 1159F MED LIST DOCD IN RCRD: CPT | Mod: CPTII,S$GLB,, | Performed by: INTERNAL MEDICINE

## 2023-11-21 RX ORDER — SODIUM CHLORIDE 0.9 % (FLUSH) 0.9 %
10 SYRINGE (ML) INJECTION
Status: CANCELLED | OUTPATIENT
Start: 2023-11-24

## 2023-11-21 RX ORDER — HEPARIN 100 UNIT/ML
500 SYRINGE INTRAVENOUS
Status: CANCELLED | OUTPATIENT
Start: 2023-11-24

## 2023-11-21 RX ADMIN — SODIUM CHLORIDE 1000 ML: 9 INJECTION, SOLUTION INTRAVENOUS at 08:11

## 2023-11-21 NOTE — PATIENT INSTRUCTIONS
Continue current diabetes medications.   There can be risk of low blood sugars with glimepiride and low kidney function.   Please watch for frequent low blood sugars.     Check blood sugars before meals and bedtime.   Call if blood sugars are frequently less than 100 or above 200.  Takes regular meals and consistent carbohydrate diet    Referral to the diabetes educator.   Call if you need prescriptions for medications.  Carry glucose tablets or snacks with you at all times.     Follow-up in 3-4 months.

## 2023-11-21 NOTE — PROGRESS NOTES
Patient arrived to infusion suite for 1L NS bolus over 1 hr. Patient blood pressure was 187/101, patient stated he took his blood pressure medication Midodrine 10 mg this morning at 5 am as he was symptomatic    Rechecked BP 20 minutes later down to 180/94.    Patient tolerating infusion well. Return appointment provided.     Limited head-to-toe assessment due to privacy issues and visit reason though the opportunity was given for patient to express any concerns.

## 2023-11-21 NOTE — PROGRESS NOTES
ENDOCRINOLOGY CLINIC    Subjective:      Patient ID: Jarrett Charlton Jr. is referred by Vera Sommers DO     Chief Complaint:  Type 2 diabetes    HPI:   Jarrett Charlton is a 73 y.o. male who presents for management of type 2 diabetes.      Diabetes Hx:  Diagnosed w/ DM: Early 60s.   Complications:   Retinopathy: Yes   Last eye exam: : 09/24/2020, sees OSH.   Neuropathy: Yes, Has appointment with pain clinic next month for back/neck pain.     Last foot exam: : 12/20/2022  Nephropathy: Yes. CKD 4 Sees nephrologist.  Cardiovascular: CAD  Gastroparesis: No  DKA/HHS: No    Severe Hypoglycemia: No, Hypoglycemia unawareness: No  Hypoglycemic episodes: Not often    Current meds:   Glimepiride 4 mg BID    Compliance with meds: Yes     Previous meds:  Metformin     Home glucose checks: Check 3-4 times day. On recall around 120-140, lowest in the 80.   Compliant: Yes    Diet/Exercise:   Takes 2 meals a day, occasional snacks on fruit  Take enough water.     Diabetes education:  None    Last A1c:   Lab Results   Component Value Date    HGBA1C 6.7 (H) 10/26/2023    HGBA1C 6.8 (H) 09/27/2023    HGBA1C 6.3 (H) 06/26/2023     Microalbumin:   Lab Results   Component Value Date    LABMICR 14.0 10/13/2023    CREATRANDUR 32.0 10/13/2023    CREATRANDUR 32.0 10/13/2023    MICALBCREAT 43.8 (H) 10/13/2023     Lab Results   Component Value Date    EGFRNORACEVR 30.7 (A) 11/03/2023    CREATININE 2.2 (H) 11/03/2023     Lipids:   Lab Results   Component Value Date    CHOL 161 09/27/2023    TRIG 164 (H) 09/27/2023    HDL 36 (L) 09/27/2023    LDLCALC 92.2 09/27/2023    CHOLHDL 22.4 09/27/2023     TSH:  Lab Results   Component Value Date    TSH 0.957 10/26/2023     Lab Results   Component Value Date    HGB 10.9 (L) 11/03/2023      Aspirin: Yes  Statins: Yes  ACEI/ARB: No  Fatty liver: No  HTN: On medications, takes midodrine for hypotension.   Seen in the ED several times.      Denies history of pancreatitis or medullary thyroid cancer.    History recurrent UTI: No  History of recurrent fungal infection: No    Polyuria: No  Polydipsia: No    FHx of DM: Yes, Father  Heart disease: No    ROS: see HPI     Objective:     Physical Exam     BP (!) 188/95 (BP Location: Right arm, Patient Position: Sitting, BP Method: Large (Automatic))   Pulse 77   Wt 97.4 kg (214 lb 11.7 oz)   SpO2 99%   BMI 27.57 kg/m²     Wt Readings from Last 3 Encounters:   11/21/23 97.4 kg (214 lb 11.7 oz)   11/21/23 96.9 kg (213 lb 8.3 oz)   11/17/23 90.9 kg (200 lb 8.1 oz)       Constitutional:  Pleasant,  in no acute distress.   HENT:   Head:    Normocephalic and atraumatic.   Eyes:    EOMI. No scleral icterus.   Cardiovascular:  Normal rate  Respiratory:   Effort normal   Neurological:  No tremor  Skin:    Skin is warm, dry  Extremity:  No edema      LABORATORY REVIEW:  See HPI for other labs reviewed today      Chemistry        Component Value Date/Time     11/03/2023 0931    K 4.2 11/03/2023 0931     11/03/2023 0931    CO2 23 11/03/2023 0931    BUN 38 (H) 11/03/2023 0931    CREATININE 2.2 (H) 11/03/2023 0931     (H) 11/03/2023 0931        Component Value Date/Time    CALCIUM 8.7 11/03/2023 0931    ALKPHOS 160 (H) 10/26/2023 1115    AST 25 10/26/2023 1115    ALT 24 10/26/2023 1115    BILITOT 0.5 10/26/2023 1115    ESTGFRAFRICA 18.9 (A) 07/27/2022 1102    EGFRNONAA 16.3 (A) 07/27/2022 1102          Lab Results   Component Value Date    HGBA1C 6.7 (H) 10/26/2023    HGBA1C 6.8 (H) 09/27/2023    HGBA1C 6.3 (H) 06/26/2023     Other labs reviewed today in HPI    Assessment/Plan:     Problem List Items Addressed This Visit          Cardiac/Vascular    HLD (hyperlipidemia)       Continue statin.               Endocrine    Type 2 diabetes mellitus with stage 3 chronic kidney disease, without long-term current use of insulin - Primary         Last A1c was 6.7.    Continue current diabetes regimen.  Discussed risk of low blood sugars with glimepiride and low kidney  function.     Discussed goal A1c of 7-7.5%  Check blood sugars before meals and bedtime.  Call if blood sugars are persistently less than 70 or greater than 200.     Discussed importance of diet and lifestyle modifications for diabetes management    Hypoglycemia management discussed. Carry glucose tablets or snacks at all times.     Discussed risk of complications with uncontrolled diabetes.    Complications:  Follow up for regular diabetes eye exam  Daily self examination of feet.  CKD 4: Monitor. Continue follow up with nephrologist.              Relevant Orders    Ambulatory referral/consult to Diabetes Education      Follow up in about 3 months (around 2/21/2024).     Laine Alvarez MD

## 2023-11-21 NOTE — TELEPHONE ENCOUNTER
"Spoke with patient, informed of provider message and recommendations.  Patient verbalized understanding, states "this is the same conversation, I remember what he said".  "

## 2023-11-24 ENCOUNTER — INFUSION (OUTPATIENT)
Dept: INFECTIOUS DISEASES | Facility: HOSPITAL | Age: 73
End: 2023-11-24
Payer: MEDICARE

## 2023-11-24 VITALS
HEART RATE: 89 BPM | WEIGHT: 213.88 LBS | RESPIRATION RATE: 21 BRPM | SYSTOLIC BLOOD PRESSURE: 177 MMHG | TEMPERATURE: 98 F | OXYGEN SATURATION: 100 % | DIASTOLIC BLOOD PRESSURE: 86 MMHG | BODY MASS INDEX: 27.45 KG/M2 | HEIGHT: 74 IN

## 2023-11-24 DIAGNOSIS — E11.22 TYPE 2 DIABETES MELLITUS WITH STAGE 3B CHRONIC KIDNEY DISEASE, WITHOUT LONG-TERM CURRENT USE OF INSULIN: Primary | ICD-10-CM

## 2023-11-24 DIAGNOSIS — Z93.3 COLOSTOMY PRESENT: ICD-10-CM

## 2023-11-24 DIAGNOSIS — E86.0 DEHYDRATION: ICD-10-CM

## 2023-11-24 DIAGNOSIS — N18.32 TYPE 2 DIABETES MELLITUS WITH STAGE 3B CHRONIC KIDNEY DISEASE, WITHOUT LONG-TERM CURRENT USE OF INSULIN: Primary | ICD-10-CM

## 2023-11-24 PROCEDURE — 96360 HYDRATION IV INFUSION INIT: CPT | Mod: HCNC

## 2023-11-24 PROCEDURE — 25000003 PHARM REV CODE 250: Mod: HCNC | Performed by: NURSE PRACTITIONER

## 2023-11-24 RX ORDER — SODIUM CHLORIDE 0.9 % (FLUSH) 0.9 %
10 SYRINGE (ML) INJECTION
Status: CANCELLED | OUTPATIENT
Start: 2023-11-28

## 2023-11-24 RX ORDER — HEPARIN 100 UNIT/ML
500 SYRINGE INTRAVENOUS
Status: CANCELLED | OUTPATIENT
Start: 2023-11-28

## 2023-11-24 RX ADMIN — SODIUM CHLORIDE 1000 ML: 9 INJECTION, SOLUTION INTRAVENOUS at 08:11

## 2023-11-24 NOTE — PROGRESS NOTES
Patient received 1L NS bolus over 1 hr as ordered. Patient tolerated well without difficulty. Return appointment provided.     Limited head-to-toe assessment due to privacy issues and visit reason though the opportunity was given for patient to express any concerns.

## 2023-11-27 PROBLEM — I95.9 HYPOTENSION: Status: ACTIVE | Noted: 2017-12-06

## 2023-11-27 PROBLEM — R55 SYNCOPE AND COLLAPSE: Status: ACTIVE | Noted: 2023-11-27

## 2023-11-28 ENCOUNTER — OFFICE VISIT (OUTPATIENT)
Dept: NEUROLOGY | Facility: CLINIC | Age: 73
End: 2023-11-28
Payer: MEDICARE

## 2023-11-28 VITALS
BODY MASS INDEX: 26.68 KG/M2 | HEART RATE: 87 BPM | WEIGHT: 207.88 LBS | HEIGHT: 74 IN | SYSTOLIC BLOOD PRESSURE: 113 MMHG | DIASTOLIC BLOOD PRESSURE: 65 MMHG

## 2023-11-28 DIAGNOSIS — I95.1 AUTONOMIC ORTHOSTATIC HYPOTENSION: Primary | ICD-10-CM

## 2023-11-28 PROBLEM — I95.9 HYPOTENSION: Status: RESOLVED | Noted: 2017-12-06 | Resolved: 2023-11-28

## 2023-11-28 PROBLEM — R55 SYNCOPE: Status: RESOLVED | Noted: 2019-12-28 | Resolved: 2023-11-28

## 2023-11-28 PROBLEM — E87.6 HYPOKALEMIA: Status: RESOLVED | Noted: 2022-03-09 | Resolved: 2023-11-28

## 2023-11-28 PROBLEM — R55 SYNCOPE AND COLLAPSE: Status: RESOLVED | Noted: 2023-11-27 | Resolved: 2023-11-28

## 2023-11-28 PROBLEM — N17.9 AKI (ACUTE KIDNEY INJURY): Status: RESOLVED | Noted: 2017-12-05 | Resolved: 2023-11-28

## 2023-11-28 PROCEDURE — 3044F HG A1C LEVEL LT 7.0%: CPT | Mod: HCNC,CPTII,S$GLB, | Performed by: PSYCHIATRY & NEUROLOGY

## 2023-11-28 PROCEDURE — 1126F PR PAIN SEVERITY QUANTIFIED, NO PAIN PRESENT: ICD-10-PCS | Mod: HCNC,CPTII,S$GLB, | Performed by: PSYCHIATRY & NEUROLOGY

## 2023-11-28 PROCEDURE — 99999 PR PBB SHADOW E&M-EST. PATIENT-LVL III: ICD-10-PCS | Mod: PBBFAC,HCNC,, | Performed by: PSYCHIATRY & NEUROLOGY

## 2023-11-28 PROCEDURE — 3074F SYST BP LT 130 MM HG: CPT | Mod: HCNC,CPTII,S$GLB, | Performed by: PSYCHIATRY & NEUROLOGY

## 2023-11-28 PROCEDURE — 3044F PR MOST RECENT HEMOGLOBIN A1C LEVEL <7.0%: ICD-10-PCS | Mod: HCNC,CPTII,S$GLB, | Performed by: PSYCHIATRY & NEUROLOGY

## 2023-11-28 PROCEDURE — 3060F POS MICROALBUMINURIA REV: CPT | Mod: HCNC,CPTII,S$GLB, | Performed by: PSYCHIATRY & NEUROLOGY

## 2023-11-28 PROCEDURE — 3060F PR POS MICROALBUMINURIA RESULT DOCUMENTED/REVIEW: ICD-10-PCS | Mod: HCNC,CPTII,S$GLB, | Performed by: PSYCHIATRY & NEUROLOGY

## 2023-11-28 PROCEDURE — 3078F PR MOST RECENT DIASTOLIC BLOOD PRESSURE < 80 MM HG: ICD-10-PCS | Mod: HCNC,CPTII,S$GLB, | Performed by: PSYCHIATRY & NEUROLOGY

## 2023-11-28 PROCEDURE — 1101F PT FALLS ASSESS-DOCD LE1/YR: CPT | Mod: HCNC,CPTII,S$GLB, | Performed by: PSYCHIATRY & NEUROLOGY

## 2023-11-28 PROCEDURE — 3288F PR FALLS RISK ASSESSMENT DOCUMENTED: ICD-10-PCS | Mod: HCNC,CPTII,S$GLB, | Performed by: PSYCHIATRY & NEUROLOGY

## 2023-11-28 PROCEDURE — 99213 PR OFFICE/OUTPT VISIT, EST, LEVL III, 20-29 MIN: ICD-10-PCS | Mod: HCNC,S$GLB,, | Performed by: PSYCHIATRY & NEUROLOGY

## 2023-11-28 PROCEDURE — 3008F PR BODY MASS INDEX (BMI) DOCUMENTED: ICD-10-PCS | Mod: HCNC,CPTII,S$GLB, | Performed by: PSYCHIATRY & NEUROLOGY

## 2023-11-28 PROCEDURE — 3008F BODY MASS INDEX DOCD: CPT | Mod: HCNC,CPTII,S$GLB, | Performed by: PSYCHIATRY & NEUROLOGY

## 2023-11-28 PROCEDURE — 1159F PR MEDICATION LIST DOCUMENTED IN MEDICAL RECORD: ICD-10-PCS | Mod: HCNC,CPTII,S$GLB, | Performed by: PSYCHIATRY & NEUROLOGY

## 2023-11-28 PROCEDURE — 1111F PR DISCHARGE MEDS RECONCILED W/ CURRENT OUTPATIENT MED LIST: ICD-10-PCS | Mod: HCNC,CPTII,S$GLB, | Performed by: PSYCHIATRY & NEUROLOGY

## 2023-11-28 PROCEDURE — 3074F PR MOST RECENT SYSTOLIC BLOOD PRESSURE < 130 MM HG: ICD-10-PCS | Mod: HCNC,CPTII,S$GLB, | Performed by: PSYCHIATRY & NEUROLOGY

## 2023-11-28 PROCEDURE — 3078F DIAST BP <80 MM HG: CPT | Mod: HCNC,CPTII,S$GLB, | Performed by: PSYCHIATRY & NEUROLOGY

## 2023-11-28 PROCEDURE — 3066F NEPHROPATHY DOC TX: CPT | Mod: HCNC,CPTII,S$GLB, | Performed by: PSYCHIATRY & NEUROLOGY

## 2023-11-28 PROCEDURE — 1111F DSCHRG MED/CURRENT MED MERGE: CPT | Mod: HCNC,CPTII,S$GLB, | Performed by: PSYCHIATRY & NEUROLOGY

## 2023-11-28 PROCEDURE — 1159F MED LIST DOCD IN RCRD: CPT | Mod: HCNC,CPTII,S$GLB, | Performed by: PSYCHIATRY & NEUROLOGY

## 2023-11-28 PROCEDURE — 1101F PR PT FALLS ASSESS DOC 0-1 FALLS W/OUT INJ PAST YR: ICD-10-PCS | Mod: HCNC,CPTII,S$GLB, | Performed by: PSYCHIATRY & NEUROLOGY

## 2023-11-28 PROCEDURE — 1126F AMNT PAIN NOTED NONE PRSNT: CPT | Mod: HCNC,CPTII,S$GLB, | Performed by: PSYCHIATRY & NEUROLOGY

## 2023-11-28 PROCEDURE — 99213 OFFICE O/P EST LOW 20 MIN: CPT | Mod: HCNC,S$GLB,, | Performed by: PSYCHIATRY & NEUROLOGY

## 2023-11-28 PROCEDURE — 99999 PR PBB SHADOW E&M-EST. PATIENT-LVL III: CPT | Mod: PBBFAC,HCNC,, | Performed by: PSYCHIATRY & NEUROLOGY

## 2023-11-28 PROCEDURE — 3288F FALL RISK ASSESSMENT DOCD: CPT | Mod: HCNC,CPTII,S$GLB, | Performed by: PSYCHIATRY & NEUROLOGY

## 2023-11-28 PROCEDURE — 3066F PR DOCUMENTATION OF TREATMENT FOR NEPHROPATHY: ICD-10-PCS | Mod: HCNC,CPTII,S$GLB, | Performed by: PSYCHIATRY & NEUROLOGY

## 2023-11-28 NOTE — PROGRESS NOTES
Jarrett Charlton Jr. is a 73 y.o. year old male that  presents for follow up of postural hypotension with syncope.    HPI:  Mr Charlton has HTN, HLD, DM, CKD,CAD, carotid stenosis, ulcerative colitis s/p colectomy and ileostomy, BPG s/p TURP, DJD, and postural hypotension. I first saw Mr Charlton on 3/2/23 regarding possible autonomic failure with orthostatic hypotension.  Has been on fludrocortisone 0.1 mg daily and midodrine 10 mg (unclear frequency) but reports very labile BP with persistently elevated BP at night with marked hypotension and dizziness when he wakes up in the morning.   He was admitted to [facility] on 11/26  with cardiogenic syncope due to hypotension  and discharged yesterday.   Offers no new neurological complains today.      Past Medical History:   Diagnosis Date    Basal cell carcinoma     BPH (benign prostatic hyperplasia)     s/p TURP    Carotid stenosis     Chronic kidney disease     Claudication     Coronary artery disease     DDD (degenerative disc disease) 10/21/2013    Diabetes mellitus with renal complications     Disc disease, degenerative, cervical     Encounter for blood transfusion     GERD (gastroesophageal reflux disease)     Gout, chronic     History of ulcerative colitis     s/p colectomy and ileostomy    HLD (hyperlipidemia)     HTN (hypertension)     Ileostomy in place 1982    RBBB     Squamous cell carcinoma 03/08/2018    Left superior helix near insertion    Squamous cell carcinoma 04/12/2018    Left forearm x 5    Ventricular tachycardia      Social History     Socioeconomic History    Marital status:     Number of children: 1   Occupational History    Occupation:  x 44 years     Comment: Retired    Occupation: [place]    Tobacco Use    Smoking status: Never    Smokeless tobacco: Never   Substance and Sexual Activity    Alcohol use: No    Drug use: No    Sexual activity: Not Currently     Partners: Female     Social  Determinants of Health     Financial Resource Strain: Low Risk  (11/27/2023)    Overall Financial Resource Strain (CARDIA)     Difficulty of Paying Living Expenses: Not hard at all   Food Insecurity: No Food Insecurity (11/27/2023)    Hunger Vital Sign     Worried About Running Out of Food in the Last Year: Never true     Ran Out of Food in the Last Year: Never true   Transportation Needs: No Transportation Needs (11/27/2023)    PRAPARE - Transportation     Lack of Transportation (Medical): No     Lack of Transportation (Non-Medical): No   Physical Activity: Sufficiently Active (5/28/2023)    Exercise Vital Sign     Days of Exercise per Week: 7 days     Minutes of Exercise per Session: 60 min   Stress: No Stress Concern Present (11/27/2023)    [employer] [place] [employer] - Occupational Stress Questionnaire     Feeling of Stress : Only a little   Social Connections: Moderately Integrated (11/27/2023)    Social Connection and Isolation Panel [NHANES]     Frequency of Communication with Friends and Family: More than three times a week     Frequency of Social Gatherings with Friends and Family: More than three times a week     Attends Yazidi Services: 1 to 4 times per year     Active Member of Clubs or Organizations: Yes     Attends Club or Organization Meetings: 1 to 4 times per year     Marital Status:    Housing Stability: Low Risk  (11/27/2023)    Housing Stability Vital Sign     Unable to Pay for Housing in the Last Year: No     Number of Places Lived in the Last Year: 1     Unstable Housing in the Last Year: No     Past Surgical History:   Procedure Laterality Date    cardiac stents      CATARACT EXTRACTION Bilateral     CERVICAL FUSION      CHOLECYSTECTOMY N/A 2/14/2023    Procedure: CHOLECYSTECTOMY;  Surgeon: Mike Joyce MD;  Location: [facility];  Service: General;  Laterality: N/A;  very high probabilty of converison to open    colectomy and ileostomy  1985    ENDOSCOPIC ULTRASOUND OF  UPPER GASTROINTESTINAL TRACT N/A 2/10/2023    Procedure: ULTRASOUND, UPPER GI TRACT, ENDOSCOPIC;  Surgeon: Poli Fuller MD;  Location: [facility] ENDO;  Service: Endoscopy;  Laterality: N/A;    ERCP N/A 2/10/2023    Procedure: ERCP (ENDOSCOPIC RETROGRADE CHOLANGIOPANCREATOGRAPHY);  Surgeon: Poli Fuller MD;  Location: [facility] ENDO;  Service: Endoscopy;  Laterality: N/A;    EXCISION OF PAROTID GLAND Left 12/18/2020    Procedure: EXCISION, PAROTID GLAND;  Surgeon: Michael Pinzon MD;  Location: [facility] OR 2ND FLR;  Service: ENT;  Laterality: Left;    INSERTION OF IMPLANTABLE LOOP RECORDER  06/07/2021    INSERTION OF IMPLANTABLE LOOP RECORDER Left 6/7/2021    Procedure: INSERTION, IMPLANTABLE LOOP RECORDER;  Surgeon: Romaroi Dao MD;  Location: [facility] CATH LAB;  Service: Cardiology;  Laterality: Left;    LEFT HEART CATHETERIZATION Right 4/15/2021    Procedure: CATHETERIZATION, HEART, LEFT;  Surgeon: Marcio Jones MD;  Location: [facility] CATH LAB;  Service: Cardiology;  Laterality: Right;    LYSIS OF ADHESIONS N/A 11/9/2020    Procedure: LYSIS, ADHESIONS,  ERAS low;  Surgeon: CED Haley MD;  Location: [facility] OR 2ND FLR;  Service: Colon and Rectal;  Laterality: N/A;    LYSIS OF ADHESIONS N/A 2/14/2023    Procedure: LYSIS, ADHESIONS;  Surgeon: Mike Joyce MD;  Location: [facility] OR;  Service: General;  Laterality: N/A;    POUCHOSCOPY N/A 4/6/2022    Procedure: ENDOSCOPY, POUCH, SMALL INTESTINE, DIAGNOSTIC;  Surgeon: Dieter Juarez MD;  Location: [facility] (2ND FLR);  Service: Endoscopy;  Laterality: N/A;    REPAIR, HERNIA, PARASTOMAL N/A 11/9/2020    Procedure: REPAIR, HERNIA, PARASTOMAL;  Surgeon: CED Haley MD;  Location: [facility] OR 2ND FLR;  Service: Colon and Rectal;  Laterality: N/A;    TRANSURETHRAL RESECTION OF PROSTATE (TURP) WITHOUT USE OF LASER N/A 1/23/2019    Procedure: TURP, WITHOUT USING LASER BIPOLAR;  Surgeon: Catarino Mota MD;  Location: [facility] OR 1ST FLR;  Service: Urology;  Laterality:  "N/A;  1.5 HOURS     Family History   Problem Relation Age of Onset    Cancer Father     Diabetes Father     Dementia Mother     Melanoma Neg Hx     Hypertension Neg Hx     Arthritis Neg Hx            Review of Systems  General ROS: negative for chills, fever or weight loss  Psychological ROS: negative for hallucination, depression or suicidal ideation  Ophthalmic ROS: negative for blurry vision, photophobia or eye pain  ENT ROS: negative for epistaxis, sore throat or rhinorrhea  Respiratory ROS: no cough, shortness of breath, or wheezing  Cardiovascular ROS: no chest pain or dyspnea on exertion  Gastrointestinal ROS: no abdominal pain, change in bowel habits, or black/ bloody stools  Genito-Urinary ROS: no dysuria, trouble voiding, or hematuria  Musculoskeletal ROS: negative for gait disturbance or muscular weakness  Neurological ROS: no syncope or seizures; no ataxia  Dermatological ROS: negative for pruritis, rash and jaundice      Physical Exam:  /65   Pulse 87   Ht 6' 2" (1.88 m)   Wt 94.3 kg (207 lb 14.3 oz)   BMI 26.69 kg/m²   General appearance: alert, cooperative, no distress  Constitutional:Oriented to person, place, and time.appears well-developed and well-nourished.   HEENT: Normocephalic, atraumatic, neck symmetrical, no nasal discharge   Eyes: conjunctivae/corneas clear, PERRL, EOM's intact  Lungs: clear to auscultation bilaterally, no dullness to percussion bilaterally  Heart: regular rate and rhythm without rub; no displacement of the PMI   Abdomen: soft, non-tender; bowel sounds normoactive; no organomegaly  Extremities: extremities symmetric; no clubbing, cyanosis, or edema  Integument: Skin color, texture, turgor normal; no rashes; hair distrubution normal  Neurologic: Alert and oriented X 3, normal strength, normal coordination and gait  Psychiatric: no pressured speech; normal affect; no evidence of impaired cognition     LABS:    Complete Blood Count  Lab Results   Component Value Date "    RBC 3.91 (L) 11/28/2023    HGB 11.3 (L) 11/28/2023    HCT 35.2 (L) 11/28/2023    MCV 90 11/28/2023    MCH 28.9 11/28/2023    MCHC 32.1 11/28/2023    RDW 14.7 (H) 11/28/2023     11/28/2023    MPV 11.0 11/28/2023    GRAN 4.8 11/28/2023    GRAN 65.0 11/28/2023    LYMPH 2.1 11/28/2023    LYMPH 27.7 11/28/2023    MONO 0.4 11/28/2023    MONO 5.6 11/28/2023    EOS 0.1 11/28/2023    BASO 0.03 11/28/2023    EOSINOPHIL 1.2 11/28/2023    BASOPHIL 0.4 11/28/2023    DIFFMETHOD Automated 11/28/2023       Comprehensive Metabolic Panel  Lab Results   Component Value Date     (H) 11/28/2023    BUN 61 (H) 11/28/2023    CREATININE 3.5 (H) 11/28/2023     11/28/2023    K 3.6 11/28/2023     11/28/2023    PROT 6.6 11/28/2023    ALBUMIN 3.2 (L) 11/28/2023    BILITOT 0.4 11/28/2023    AST 24 11/28/2023    ALKPHOS 110 11/28/2023    CO2 18 (L) 11/28/2023    ALT 22 11/28/2023    ANIONGAP 13 11/28/2023    EGFRNONAA 16.3 (A) 07/27/2022    ESTGFRAFRICA 18.9 (A) 07/27/2022       TSH  Lab Results   Component Value Date    TSH 0.957 10/26/2023         Assessment: 74 y/o with HTN, HLD, DM, CKD,CAD, carotid stenosis, ulcerative colitis s/p colectomy and ileostomy, BPG s/p TURP, DJD, and postural hypotension with syncope.  Concern for autonomic failure.  Wonder if nocturnal BP elevation related to fludrocortisone. Also, unclear how he is taking his midrodine and if he really failed droxidopa.  May consider a trial of mestinon.        No diagnosis found.  There were no encounter diagnoses.      Plan:  1) Postural hypotension and syncope:as above  2) HTN  3) HLD  4) DM  5) CKD  6) Carotid stenosis  7) Ulcerative colitis s/p colectomy and ileostomy  8) BPG s/p TURP,  9) DJD          No orders of the defined types were placed in this encounter.          Osmar Miramontes MD

## 2023-11-29 ENCOUNTER — TELEPHONE (OUTPATIENT)
Dept: NEUROLOGY | Facility: CLINIC | Age: 73
End: 2023-11-29
Payer: MEDICARE

## 2023-11-29 RX ORDER — PYRIDOSTIGMINE BROMIDE 30 MG/1
30 TABLET ORAL EVERY 8 HOURS
Qty: 90 TABLET | Refills: 1 | Status: SHIPPED | OUTPATIENT
Start: 2023-11-29

## 2023-11-29 NOTE — TELEPHONE ENCOUNTER
----- Message from Belkis Long sent at 11/29/2023  2:19 PM CST -----  Regarding: Refill  Contact: Pt @ [phone]  Rx Refill/Request    Is this a Refill or New Rx:refill  '  Rx Name and Strength:pyRIDostigmine bromide 30 mg Tab    Preferred Pharmacy with phone number:  [facility] [address]  [place], [place] [facility] [address]  [address]  [place] [address]  Phone: [phone] Fax: [phone]    Communication Preference:pt @ [phone]    Additional Information:pt need only a 30 day supply to see if medication works. Asking for a call back

## 2023-11-30 ENCOUNTER — TELEPHONE (OUTPATIENT)
Dept: PRIMARY CARE CLINIC | Facility: CLINIC | Age: 73
End: 2023-11-30

## 2023-11-30 ENCOUNTER — CLINICAL SUPPORT (OUTPATIENT)
Dept: CARDIOLOGY | Facility: HOSPITAL | Age: 73
End: 2023-11-30
Payer: MEDICARE

## 2023-11-30 ENCOUNTER — CLINICAL SUPPORT (OUTPATIENT)
Dept: CARDIOLOGY | Facility: HOSPITAL | Age: 73
End: 2023-11-30
Attending: INTERNAL MEDICINE
Payer: MEDICARE

## 2023-11-30 DIAGNOSIS — Z95.818 PRESENCE OF OTHER CARDIAC IMPLANTS AND GRAFTS: ICD-10-CM

## 2023-12-01 ENCOUNTER — INFUSION (OUTPATIENT)
Dept: INFECTIOUS DISEASES | Facility: HOSPITAL | Age: 73
End: 2023-12-01
Payer: MEDICARE

## 2023-12-01 VITALS
HEART RATE: 96 BPM | WEIGHT: 203.06 LBS | DIASTOLIC BLOOD PRESSURE: 98 MMHG | SYSTOLIC BLOOD PRESSURE: 174 MMHG | OXYGEN SATURATION: 95 % | RESPIRATION RATE: 16 BRPM | BODY MASS INDEX: 26.06 KG/M2 | TEMPERATURE: 99 F | HEIGHT: 74 IN

## 2023-12-01 DIAGNOSIS — E11.22 TYPE 2 DIABETES MELLITUS WITH STAGE 3B CHRONIC KIDNEY DISEASE, WITHOUT LONG-TERM CURRENT USE OF INSULIN: Primary | ICD-10-CM

## 2023-12-01 DIAGNOSIS — Z93.3 COLOSTOMY PRESENT: ICD-10-CM

## 2023-12-01 DIAGNOSIS — E86.0 DEHYDRATION: ICD-10-CM

## 2023-12-01 DIAGNOSIS — N18.32 TYPE 2 DIABETES MELLITUS WITH STAGE 3B CHRONIC KIDNEY DISEASE, WITHOUT LONG-TERM CURRENT USE OF INSULIN: Primary | ICD-10-CM

## 2023-12-01 PROCEDURE — 96365 THER/PROPH/DIAG IV INF INIT: CPT | Mod: HCNC

## 2023-12-01 PROCEDURE — 25000003 PHARM REV CODE 250: Mod: HCNC | Performed by: NURSE PRACTITIONER

## 2023-12-01 RX ORDER — HEPARIN 100 UNIT/ML
500 SYRINGE INTRAVENOUS
Status: CANCELLED | OUTPATIENT
Start: 2023-12-05

## 2023-12-01 RX ORDER — SODIUM CHLORIDE 0.9 % (FLUSH) 0.9 %
10 SYRINGE (ML) INJECTION
Status: CANCELLED | OUTPATIENT
Start: 2023-12-05

## 2023-12-01 RX ADMIN — SODIUM CHLORIDE 1000 ML: 9 INJECTION, SOLUTION INTRAVENOUS at 08:12

## 2023-12-01 NOTE — PROGRESS NOTES
Patient arrived to infusion suite for 1L NS bolus over 1 hr. Patient tolerating infusion well. Return appointment provided.     Limited head-to-toe assessment due to privacy issues and visit reason though the opportunity was given for patient to express any concerns.

## 2023-12-05 ENCOUNTER — INFUSION (OUTPATIENT)
Dept: INFECTIOUS DISEASES | Facility: HOSPITAL | Age: 73
End: 2023-12-05
Payer: MEDICARE

## 2023-12-05 VITALS
SYSTOLIC BLOOD PRESSURE: 158 MMHG | HEIGHT: 74 IN | DIASTOLIC BLOOD PRESSURE: 84 MMHG | HEART RATE: 71 BPM | TEMPERATURE: 98 F | BODY MASS INDEX: 27.39 KG/M2 | RESPIRATION RATE: 18 BRPM | OXYGEN SATURATION: 98 % | WEIGHT: 213.38 LBS

## 2023-12-05 DIAGNOSIS — Z93.3 COLOSTOMY PRESENT: ICD-10-CM

## 2023-12-05 DIAGNOSIS — E86.0 DEHYDRATION: ICD-10-CM

## 2023-12-05 DIAGNOSIS — N18.32 TYPE 2 DIABETES MELLITUS WITH STAGE 3B CHRONIC KIDNEY DISEASE, WITHOUT LONG-TERM CURRENT USE OF INSULIN: Primary | ICD-10-CM

## 2023-12-05 DIAGNOSIS — E11.22 TYPE 2 DIABETES MELLITUS WITH STAGE 3B CHRONIC KIDNEY DISEASE, WITHOUT LONG-TERM CURRENT USE OF INSULIN: Primary | ICD-10-CM

## 2023-12-05 PROCEDURE — 25000003 PHARM REV CODE 250: Mod: HCNC | Performed by: NURSE PRACTITIONER

## 2023-12-05 PROCEDURE — 96360 HYDRATION IV INFUSION INIT: CPT | Mod: HCNC

## 2023-12-05 RX ORDER — HEPARIN 100 UNIT/ML
500 SYRINGE INTRAVENOUS
Status: CANCELLED | OUTPATIENT
Start: 2023-12-08

## 2023-12-05 RX ORDER — SODIUM CHLORIDE 0.9 % (FLUSH) 0.9 %
10 SYRINGE (ML) INJECTION
Status: DISCONTINUED | OUTPATIENT
Start: 2023-12-05 | End: 2023-12-05 | Stop reason: HOSPADM

## 2023-12-05 RX ORDER — SODIUM CHLORIDE 0.9 % (FLUSH) 0.9 %
10 SYRINGE (ML) INJECTION
Status: CANCELLED | OUTPATIENT
Start: 2023-12-08

## 2023-12-05 RX ORDER — HEPARIN 100 UNIT/ML
500 SYRINGE INTRAVENOUS
Status: DISCONTINUED | OUTPATIENT
Start: 2023-12-05 | End: 2023-12-05 | Stop reason: HOSPADM

## 2023-12-05 RX ADMIN — SODIUM CHLORIDE 1000 ML: 9 INJECTION, SOLUTION INTRAVENOUS at 08:12

## 2023-12-05 NOTE — PROGRESS NOTES
Patient arrived to infusion suite for 1L NS bolus over 1 hr. Patient tolerated infusion well. Return appointment provided.     Limited head-to-toe assessment due to privacy issues and visit reason though the opportunity was given for patient to express any concerns.

## 2023-12-06 ENCOUNTER — CLINICAL SUPPORT (OUTPATIENT)
Dept: DIABETES | Facility: CLINIC | Age: 73
End: 2023-12-06
Payer: MEDICARE

## 2023-12-06 VITALS — HEIGHT: 74 IN | WEIGHT: 212.69 LBS | BODY MASS INDEX: 27.29 KG/M2

## 2023-12-06 DIAGNOSIS — E11.22 TYPE 2 DIABETES MELLITUS WITH STAGE 3A CHRONIC KIDNEY DISEASE, WITHOUT LONG-TERM CURRENT USE OF INSULIN: ICD-10-CM

## 2023-12-06 DIAGNOSIS — N18.31 TYPE 2 DIABETES MELLITUS WITH STAGE 3A CHRONIC KIDNEY DISEASE, WITHOUT LONG-TERM CURRENT USE OF INSULIN: ICD-10-CM

## 2023-12-06 DIAGNOSIS — E11.42 TYPE 2 DIABETES MELLITUS WITH DIABETIC POLYNEUROPATHY, WITHOUT LONG-TERM CURRENT USE OF INSULIN: Primary | ICD-10-CM

## 2023-12-06 PROCEDURE — 99999 PR PBB SHADOW E&M-EST. PATIENT-LVL III: CPT | Mod: PBBFAC,HCNC,, | Performed by: DIETITIAN, REGISTERED

## 2023-12-06 PROCEDURE — 99999 PR PBB SHADOW E&M-EST. PATIENT-LVL III: ICD-10-PCS | Mod: PBBFAC,HCNC,, | Performed by: DIETITIAN, REGISTERED

## 2023-12-06 PROCEDURE — G0108 PR DIAB MANAGE TRN  PER INDIV: ICD-10-PCS | Mod: HCNC,S$GLB,, | Performed by: DIETITIAN, REGISTERED

## 2023-12-06 PROCEDURE — G0108 DIAB MANAGE TRN  PER INDIV: HCPCS | Mod: HCNC,S$GLB,, | Performed by: DIETITIAN, REGISTERED

## 2023-12-06 NOTE — PROGRESS NOTES
"Diabetes Care Specialist Progress Note  Author: Melanei Zuñiga RD, CDE  Date: 12/6/2023    Program Intake  Reason for Diabetes Program Visit:: Initial Diabetes Assessment  Current diabetes risk level:: low (HgbA1c 6.7)  In the last 12 months, have you:: been admitted to a hospital  Was the ER or hospital admission related to diabetes?: No  Permission to speak with others about care:: no    Lab Results   Component Value Date    HGBA1C 6.7 (H) 10/26/2023       Clinical    Weight: 96.5 kg (212 lb 11.2 oz)   Height: 6' 2" (188 cm)   Body mass index is 27.31 kg/m².    Patient Health Rating  Compared to other people your age, how would you rate your health?: Good    Problem Review  Reviewed Problem List with Patient: yes  Active comorbidities affecting diabetes self-care.: yes  Comorbidities: Cardiovascular Disease, Hypertension, Chronic Kidney Disease, Neuropathy, Gastrointestinal Disorder  Reviewed health maintenance: yes    Clinical Assessment  Current Diabetes Treatment: Oral Medication, Diet (glimepiride 4mg twice a day)  Have you ever experienced hypoglycemia (low blood sugar)?: yes  In the last month, how often have you experienced low blood sugar?: once a week  Are you able to tell when your blood sugar is low?: Yes  What symptoms do you experience?: Tiredness, Shaky, Other (eyes get heavy)  Have you ever been hospitalized because your blood sugar was too low?: no  How do you treat hypoglycemia (low blood sugar)?: 1/2 can soda/fruit juice, 5-6 pieces of hard candy (grabs a drink or candy bar to bring it up)  Have you ever experienced hyperglycemia (high blood sugar)?: no    Medication Information  How do you obtain your medications?: Patient drives  How many days a week do you miss your medications?: Never (was taking glimepiride with meals and then sometimes 3 times a day - recommend taking with meals only twice a day, may benefit from 2mg in am and 4 mg in pm because of frequent hypoglycemia during the day " (missing lunch))  Do you use a pill box or medication chart to help you manage your medications?: Pill box  Do you sometimes have difficulty refilling your medications?: No  Medication adherence impacting ability to self-manage diabetes?: No    Labs  Do you have regular lab work to monitor your medications?: Yes  Type of Regular Lab Work: A1c, Cholesterol, Microalbumin, CBC, BMP  Where do you get your labs drawn?: [facility]  Lab Compliance Barriers: No    Nutritional Status  Diet: Regular, Gluten free  Meal Plan 24 Hour Recall: Breakfast, Lunch, Dinner, Snack  Meal Plan 24 Hour Recall - Breakfast: eggs and grits, sometimes will come and have a omelet with daughter, sometimes just gets a biscuit and coffee, and occassionally will not have anything  Meal Plan 24 Hour Recall - Lunch: skipped mostly or may have a sandwich  Meal Plan 24 Hour Recall - Dinner: baked mac and corn  Meal Plan 24 Hour Recall - Snack: popcorn, picks throughout the night, driking gatorade zero and crystal light and water  Change in appetite?: No  Dentation:: Intact  Recent Changes in Weight: No Recent Weight Change  Current nutritional status an area of need that is impacting patient's ability to self-manage diabetes?: No    Additional Social History    Support  Does anyone support you with your diabetes care?: yes  Who supports you?: son/daughter, self  Who takes you to your medical appointments?: self  Does the current support meet the patient's needs?: No  Is Support an area impacting ability to self-manage diabetes?: No    Access to Mass Media & Technology  Does the patient have access to any of the following devices or technologies?: Smart phone  Media or technology needs impacting ability to self-manage diabetes?: No    Cognitive/Behavioral Health  Alert and Oriented: Yes  Difficulty Thinking: No  Requires Prompting: No  Requires assistance for routine expression?: No  Cognitive or behavioral barriers impacting ability to self-manage  diabetes?: No    Culture/Shinto  Culture or Hinduism beliefs that may impact ability to access healthcare: No    Communication  Language preference: English  Hearing Problems: No  Vision Problems: Yes  Vision problem type:: Decreased Vision  Vision Assistance: Glasses  Communication needs impacting ability to self-manage diabetes?: No    Health Literacy  Preferred Learning Method: Face to Face  How often do you need to have someone help you read instructions, pamphlets, or written material from your doctor or pharmacy?: Rarely  Health literacy needs impacting ability to self-manage diabetes?: No      Diabetes Self-Management Skills Assessment    Diabetes Disease Process/Treatment Options  Patient/caregiver able to state what happens when someone has diabetes.: yes  Patient/caregiver knows what type of diabetes they have.: yes  Diabetes Type : Type II  Patient/caregiver able to identify at least three signs and symptoms of diabetes.: yes  Identified signs and symptoms:: increased thirst  Patient able to identify at least three risk factors for diabetes.: yes  Identified risk factors:: family history  Diabetes Disease Process/Treatment Options: Skills Assessment Completed: Yes  Assessment indicates:: Adequate understanding  Area of need?: No    Nutrition/Healthy Eating  Challenges to healthy eating:: snacking between meals and at night  Method of carbohydrate measurement:: eyeballing/guessing  Patient can identify foods that impact blood sugar.: yes  Patient-identified foods:: soda, sweets, starches (bread, pasta, rice, cereal)  Nutrition/Healthy Eating Skills Assessment Completed:: Yes  Assessment indicates:: Instruction Needed, Adequate understanding  Area of need?: Yes    Physical Activity/Exercise  Patient's daily activity level:: lightly active  Patient formally exercises outside of work.: no  Reasons for not exercising:: other (see comments)  Patient can identify forms of physical activity.: yes  Stated forms  of physical activity:: recreational activities, other (see comments) (walking)  Patient can identify reasons why exercise/physical activity is important in diabetes management.: yes  Identified reasons:: strengthens heart, muscles, and bones  Physical Activity/Exercise Skills Assessment Completed: : Yes  Assessment indicates:: Adequate understanding  Area of need?: No    Medications  Patient is able to describe current diabetes management routine.: yes  Diabetes management routine:: oral medications, diet  Patient is able to identify current diabetes medications, dosages, and appropriate timing of medications.: yes  Patient understands the purpose of the medications taken for diabetes.: no  Patient reports problems or concerns with current medication regimen.: no  Medication Skills Assessment Completed:: Yes  Assessment indicates:: Instruction Needed  Area of need?: Yes    Home Blood Glucose Monitoring  Patient states that blood sugar is checked at home daily.: yes  Monitoring Method:: home glucometer  How often do you check your blood sugar?: Twice a day  When do you check your blood sugar?: Before breakfast, Before dinner  When you check what is your typical blood sugar range? : 160 this morning, drops into the 50s, goes as high as the 200s  Blood glucose logs:: no, encouraged to keep logs, encouraged to bring logs to provider visits  Blood glucose logs reviewed today?: no  Home Blood Glucose Monitoring Skills Assessment Completed: : Yes  Assessment indicates:: Adequate understanding (may benefit from a CGM to help prevent severe hypoglycemia)  Area of need?: No    Acute Complications  Patient is able to identify types of acute complications: Yes  Patient Identified:: Hypoglycemia, Hyperglycemia  Patient is able to state the basic meaning of hypoglycemia?: Yes  Able to state the blood sugar range for hypoglycemia?: yes  Patient stated range:: <80  Patient can identify general symptoms of hypoglycemia: yes  Patient  identified:: fatigue, shakiness (eyes get heavy/feel different)  Able to state proper treatment of hypoglycemia?: yes  Patient identified:: 1/2 can soda/fruit juice, other (see comments) (candy bar, juice, or soda)  Patient is able to state the basic meaning of hyperglycemia?: Yes  Able to state the blood sugar range for hyperglycemia?: no (see comments)  Patient able to state proper treatment of hyperglycemia?: no (see comments)  Patient able to verbalize sick day plan?: no  Acute Complications Skills Assessment Completed: : Yes  Assessment indicates:: Instruction Needed, Adequate understanding  Area of need?: Yes    Chronic Complications  Patient can identify major chronic complications of diabetes.: yes  Stated chronic complications:: kidney disease, neuropathy/nerve damage  Patient can identify ways to prevent or delay diabetes complications.: no  Patient is aware that having diabetes increases risk of heart disease?: No  Patient is aware that heart disease is the leading cause of death and disability in people with diabetes?: No  Patient able to state risk factors for heart disease?: Yes  Patient stated risk factors for heart disease:: High cholesterol, High blood pressure  Patient is taking statin?: Yes (pravastatin)  Chronic Complications Skills Assessment Completed: : Yes  Assessment indicates:: Instruction Needed  Area of need?: Yes    Psychosocial/Coping  Patient can identify ways of coping with chronic disease.: yes  Patient-stated ways of coping with chronic disease:: support from loved ones (daughter)  Psychosocial/Coping Skills Assessment Completed: : Yes  Assessment indicates:: Adequate understanding  Area of need?: No      Assessment Summary and Plan    Based on today's diabetes care assessment, the following areas of need were identified:          12/6/2023    12:01 AM   Social   Support No   Access to Mass Media/Tech No   Cognitive/Behavioral Health No   Culture/Mandaen No   Communication No    Health Literacy No            12/6/2023    12:01 AM   Clinical   Medication Adherence No   Lab Compliance No   Nutritional Status No            12/6/2023    12:01 AM   Diabetes Self-Management Skills   Diabetes Disease Process/Treatment Options No   Nutrition/Healthy Eating Yes, Reviewed carb counting, portion control, importance of spacing meals throughout the day to prevent post prandial elevations.  Recommended low saturated fat, low sodium diet to aid in control of hypertension and cholesterol. Reviewed plate method and portion control, dining out tips, meal planning, reading a food label, healthy snack options, benefits of physical activity   Physical Activity/Exercise No   Medication Yes, Discussed MOA, onset, side effects, dosage of meds: glimepiride.  Provided with strategies for daily medication adherence (phone alarms, medication reminder apps, medication list, pill organizer, pill packing, pharmacy delivery options).   Discussed any concerns about regimen.   Reviewed significance of mealtimes and medication administration.   Answered patient's questions regarding if he will ever be able to get off medication.  Reviewed need for medication and challenges of glucose control with steroid treatments and stress.  The patient was instructed on storage of insulin and/or injectable medication, precautions related to hyper/hypoglycemia.   Patient was given instructions on when/who to call with problems.   Injection sites were discussed, and patient was instructed on importance of rotating sites.   Home Blood Glucose Monitoring No   Acute Complications Yes, Hyperglycemia  ABC's of Diabetes   Discussed importance of A1c less than 6.0 to reduce risk of micro and macro complications, controlled Blood Pressure 130/80 and Cholesterol Lab Values--Total Cholesterol <200mg, LDL < 100, HDL >45 men and >50 women, Triglycerides <150mg for prevention heart disease, heart attack, stroke.  how to use a glucometer  reviewed  understanding diabetes distress  reviewed current level and goal level for HgbA1c, blood glucose, microalbumin, and lipids  reviewed signs/symptoms of hyperglycemia  reviewed what level blood sugar is considered high   reviewed at what level to contact the doctor and/or go to the emergency room.   Reviewed what patient can do to decrease blood sugar (ie drink more water, exercise, take medication as prescribed, monitor blood glucose)     Hypoglycemia  Reviewed blood glucose goals, prevention, detection, and treatment of hypoglycemia, and when to contact the clinic.  reviewed signs/symptoms of hypoglycemia  reviewed what level blood sugar is considered low   reviewed at what level to contact the doctor and/or go to the emergency room.   Reviewed what patient can do to increase blood sugar (ie drink juice or ½ can soda, eat 5-6 pieces of candy, 4 glucose tablets, 1 tbsp sugar or honey, glucagon)  Reviewed when glucagon should be used  Reviewed how to use glucagon device (injections and inhaled)   Chronic Complications Yes, Diabetes Care Schedule   A1c every 3 to 6 months  Lipid Panel every year  Microalbumin (urine test) once per year  Comprehensive Foot Exam once per year  Dilated Eye Exam at least once per year  Dental exam every 6 months  Flu Vaccination yearly   Shingles and Pneumonia Vaccination as recommended by physician      Reviewed diabetes care schedule, foot care guidelines, diabetes and retinopathy screening, s/s hypo and hyperglycemia, long/short term complication of uncontrolled DM, importance of compliance with treatment plan    Reviewed risk of heart disease  High blood pressure  High cholesterol  Diet  Limited activity  Medication non-adherence  Having diabetes    Reviewed that heart disease is the leading cause of death in people with uncontrolled diabetes  Reviewed ways to prevent complications:  Avoid smoking and other types of tobacco  Checking feet daily and having routine comprehensive foot  exams  Controlling blood sugar  Controlling cholesterol and triglycerides  Having regular diabetic eye exams  Healthy eating and regular activity  Maintaining optimal blood glucose control   Psychosocial/Coping No          Today's interventions were provided through individual discussion, instruction, and written materials were provided.      Patient verbalized understanding of instruction and written materials.  Pt was able to return back demonstration of instructions today. Patient understood key points, needs reinforcement and further instruction.     Diabetes Self-Management Care Plan:    Today's Diabetes Self-Management Care Plan was developed with Jarrett's input. Jarrett has agreed to work toward the following goal(s) to improve his/her overall diabetes control.      Care Plan: Diabetes Management   Updates made since 11/6/2023 12:00 AM        Problem: Medications         Goal: Patient Agrees to take Diabetes Medication(s) glimepiride as prescribed.    Start Date: 12/6/2023   Expected End Date: 3/6/2024   This Visit's Progress: Deferred   Priority: High   Barriers: No Barriers Identified        Task: Reviewed with patient all current diabetes medications and provided basic review of the purpose, dosage, frequency, side effects, and storage of both oral and injectable diabetes medications. Completed 12/6/2023        Task: Discussed guidelines for preventing, detecting and treating hypoglycemia and hyperglycemia and reviewed the importance of meal and medication timing with diabetes mediations for prevention of hypoglycemia and maximum drug benefit. Completed 12/6/2023        Task: Reviewed and reconciled current medication list today. Completed 12/6/2023        Task: Discussed any concerns regarding the current medication regimen. Completed 12/6/2023        Task: Reviewed possible side effects of all oral and injectable diabetes medications. Completed 12/6/2023        Task: Discussed and reviewed why meal and  medication timing is important with diabetes medications. Completed 12/6/2023        Task: Reviewed action, peak, duration, dosing, and proper storage guidelines of all diabetes medications. Completed 12/6/2023        Task: Instructed on how to use a correction scale with mealtime insulin as prescribed.         Task: Instructed on proper injection technique for self-administration of an injectable medication.         Task: reviewed appropriate injection site selection and importance of rotating sites.         Task: Discussed importance of changing syringe/pen needle after each injection.         Task: Reviewed sources of Carbohydrate: Starch, Milk, Fruit, Sugar, and nonstarchy vegetables Completed 12/6/2023        Task: Instructed on application of IC, CF ratio using written examples.         Task: Discussed guidelines for preventing, detecting and treating hypoglycemia and hyperglycemia and reviewed the importance of meal and medication timing with diabetes mediations for prevention of hypoglycemia and maximum drug benefit.         Problem: Acute Complications         Goal: Patient agrees to identify and manage signs and symptoms of high/low blood sugar (hyper/hypoglycemia) by keeping a log of events and using proper treatment.    Start Date: 12/6/2023   Expected End Date: 1/6/2024   This Visit's Progress: Deferred   Priority: Medium   Barriers: No Barriers Identified        Task: Reviewed proper treatment of hypoglycemia with the rule of 15--patient to eat 15g simple carbohydrate (4 glucose tablets, 1 glucose gel, 5 pieces hard candy, ½ cup fruit juice, ½ can regular soda, etc) and wait 15 minutes and recheck home glucose. Completed 12/6/2023        Task: Reviewed common causes and precautions to help prevent hyper/hypoglycemic events. Completed 12/6/2023        Task: Reviewed signs and symptoms of hyper/hypoglycemia, what range is considered to be hyper/hypoglycemia, and when to seek further medical attention.  Completed 12/6/2023        Task: Discussed, sick day planning, natural disaster planning, and/or travel planning to prevent hyper/hypoglycemia.         Task: Discussed risk factors for developing diabetic ketoacidosis (DKA), strategies for reducing risk, testing with ketone test strips if BG is >240mg/dl, basic protocol for managing DKA, and when to seek further medical attention.           Follow Up Plan     Follow up in about 4 weeks (around 1/3/2024) for General Follow-up.    Today's care plan and follow up schedule was discussed with patient.  Jarrett verbalized understanding of the care plan, goals, and agrees to follow up plan.        The patient was encouraged to communicate with his/her health care provider/physician and care team regarding his/her condition(s) and treatment.  I provided the patient with my contact information today and encouraged to contact me via phone or [facility]'s Patient Portal as needed.     Length of Visit   Total Time: 50 Minutes

## 2023-12-08 LAB
OHS CV AF BURDEN PERCENT: 5.2
OHS CV DC REMOTE DEVICE TYPE: NORMAL
OHS CV ICD SHOCK: NO

## 2023-12-12 ENCOUNTER — INFUSION (OUTPATIENT)
Dept: INFECTIOUS DISEASES | Facility: HOSPITAL | Age: 73
End: 2023-12-12
Attending: NURSE PRACTITIONER
Payer: MEDICARE

## 2023-12-12 VITALS
TEMPERATURE: 98 F | DIASTOLIC BLOOD PRESSURE: 95 MMHG | SYSTOLIC BLOOD PRESSURE: 170 MMHG | WEIGHT: 210.56 LBS | BODY MASS INDEX: 27.02 KG/M2 | HEIGHT: 74 IN | HEART RATE: 98 BPM | RESPIRATION RATE: 20 BRPM | OXYGEN SATURATION: 100 %

## 2023-12-12 DIAGNOSIS — E11.22 TYPE 2 DIABETES MELLITUS WITH STAGE 3B CHRONIC KIDNEY DISEASE, WITHOUT LONG-TERM CURRENT USE OF INSULIN: Primary | ICD-10-CM

## 2023-12-12 DIAGNOSIS — E86.0 DEHYDRATION: ICD-10-CM

## 2023-12-12 DIAGNOSIS — Z93.3 COLOSTOMY PRESENT: ICD-10-CM

## 2023-12-12 DIAGNOSIS — N18.32 TYPE 2 DIABETES MELLITUS WITH STAGE 3B CHRONIC KIDNEY DISEASE, WITHOUT LONG-TERM CURRENT USE OF INSULIN: Primary | ICD-10-CM

## 2023-12-12 PROCEDURE — 25000003 PHARM REV CODE 250: Mod: HCNC | Performed by: NURSE PRACTITIONER

## 2023-12-12 PROCEDURE — 96360 HYDRATION IV INFUSION INIT: CPT | Mod: HCNC

## 2023-12-12 RX ORDER — SODIUM CHLORIDE 0.9 % (FLUSH) 0.9 %
10 SYRINGE (ML) INJECTION
Status: CANCELLED | OUTPATIENT
Start: 2023-12-15

## 2023-12-12 RX ORDER — HEPARIN 100 UNIT/ML
500 SYRINGE INTRAVENOUS
Status: CANCELLED | OUTPATIENT
Start: 2023-12-15

## 2023-12-12 RX ADMIN — SODIUM CHLORIDE 1000 ML: 9 INJECTION, SOLUTION INTRAVENOUS at 08:12

## 2023-12-15 ENCOUNTER — CLINICAL SUPPORT (OUTPATIENT)
Dept: CARDIOLOGY | Facility: HOSPITAL | Age: 73
End: 2023-12-15
Attending: INTERNAL MEDICINE
Payer: MEDICARE

## 2023-12-15 ENCOUNTER — CLINICAL SUPPORT (OUTPATIENT)
Dept: CARDIOLOGY | Facility: HOSPITAL | Age: 73
End: 2023-12-15

## 2023-12-15 ENCOUNTER — INFUSION (OUTPATIENT)
Dept: INFECTIOUS DISEASES | Facility: HOSPITAL | Age: 73
End: 2023-12-15
Payer: MEDICARE

## 2023-12-15 VITALS
TEMPERATURE: 99 F | HEART RATE: 86 BPM | HEIGHT: 74 IN | BODY MASS INDEX: 27.56 KG/M2 | RESPIRATION RATE: 20 BRPM | SYSTOLIC BLOOD PRESSURE: 178 MMHG | OXYGEN SATURATION: 98 % | DIASTOLIC BLOOD PRESSURE: 90 MMHG | WEIGHT: 214.75 LBS

## 2023-12-15 DIAGNOSIS — E86.0 DEHYDRATION: ICD-10-CM

## 2023-12-15 DIAGNOSIS — Z95.818 PRESENCE OF OTHER CARDIAC IMPLANTS AND GRAFTS: ICD-10-CM

## 2023-12-15 DIAGNOSIS — E11.22 TYPE 2 DIABETES MELLITUS WITH STAGE 3B CHRONIC KIDNEY DISEASE, WITHOUT LONG-TERM CURRENT USE OF INSULIN: Primary | ICD-10-CM

## 2023-12-15 DIAGNOSIS — N18.32 TYPE 2 DIABETES MELLITUS WITH STAGE 3B CHRONIC KIDNEY DISEASE, WITHOUT LONG-TERM CURRENT USE OF INSULIN: Primary | ICD-10-CM

## 2023-12-15 DIAGNOSIS — Z93.3 COLOSTOMY PRESENT: ICD-10-CM

## 2023-12-15 PROCEDURE — 25000003 PHARM REV CODE 250: Mod: HCNC | Performed by: NURSE PRACTITIONER

## 2023-12-15 PROCEDURE — 96360 HYDRATION IV INFUSION INIT: CPT | Mod: HCNC

## 2023-12-15 PROCEDURE — G2066 INTER DEVC REMOTE 30D: HCPCS | Mod: HCNC | Performed by: INTERNAL MEDICINE

## 2023-12-15 PROCEDURE — 93298 REM INTERROG DEV EVAL SCRMS: CPT | Mod: HCNC,,, | Performed by: INTERNAL MEDICINE

## 2023-12-15 RX ORDER — HEPARIN 100 UNIT/ML
500 SYRINGE INTRAVENOUS
Status: CANCELLED | OUTPATIENT
Start: 2023-12-19

## 2023-12-15 RX ORDER — SODIUM CHLORIDE 0.9 % (FLUSH) 0.9 %
10 SYRINGE (ML) INJECTION
Status: CANCELLED | OUTPATIENT
Start: 2023-12-19

## 2023-12-15 RX ADMIN — SODIUM CHLORIDE 1000 ML: 9 INJECTION, SOLUTION INTRAVENOUS at 08:12

## 2023-12-19 ENCOUNTER — INFUSION (OUTPATIENT)
Dept: INFECTIOUS DISEASES | Facility: HOSPITAL | Age: 73
End: 2023-12-19
Payer: MEDICARE

## 2023-12-19 VITALS
HEART RATE: 90 BPM | RESPIRATION RATE: 20 BRPM | HEIGHT: 74 IN | WEIGHT: 212.44 LBS | BODY MASS INDEX: 27.26 KG/M2 | DIASTOLIC BLOOD PRESSURE: 98 MMHG | SYSTOLIC BLOOD PRESSURE: 172 MMHG | OXYGEN SATURATION: 99 % | TEMPERATURE: 98 F

## 2023-12-19 DIAGNOSIS — N18.32 TYPE 2 DIABETES MELLITUS WITH STAGE 3B CHRONIC KIDNEY DISEASE, WITHOUT LONG-TERM CURRENT USE OF INSULIN: Primary | ICD-10-CM

## 2023-12-19 DIAGNOSIS — Z93.3 COLOSTOMY PRESENT: ICD-10-CM

## 2023-12-19 DIAGNOSIS — E86.0 DEHYDRATION: ICD-10-CM

## 2023-12-19 DIAGNOSIS — E11.22 TYPE 2 DIABETES MELLITUS WITH STAGE 3B CHRONIC KIDNEY DISEASE, WITHOUT LONG-TERM CURRENT USE OF INSULIN: Primary | ICD-10-CM

## 2023-12-19 PROCEDURE — 96360 HYDRATION IV INFUSION INIT: CPT | Mod: HCNC

## 2023-12-19 PROCEDURE — 25000003 PHARM REV CODE 250: Mod: HCNC | Performed by: NURSE PRACTITIONER

## 2023-12-19 RX ORDER — SODIUM CHLORIDE 0.9 % (FLUSH) 0.9 %
10 SYRINGE (ML) INJECTION
Status: CANCELLED | OUTPATIENT
Start: 2023-12-22

## 2023-12-19 RX ORDER — HEPARIN 100 UNIT/ML
500 SYRINGE INTRAVENOUS
Status: CANCELLED | OUTPATIENT
Start: 2023-12-22

## 2023-12-19 RX ADMIN — SODIUM CHLORIDE 1000 ML: 9 INJECTION, SOLUTION INTRAVENOUS at 08:12

## 2023-12-19 NOTE — PROGRESS NOTES
Patient arrived to infusion suite for 1L NS bolus over 1 hr as ordered. Patient tolerated well without difficulty. Return appointment provided.     Limited head-to-toe assessment due to privacy issues and visit reason though the opportunity was given for patient to express any concerns.

## 2023-12-22 ENCOUNTER — INFUSION (OUTPATIENT)
Dept: INFECTIOUS DISEASES | Facility: HOSPITAL | Age: 73
End: 2023-12-22
Payer: MEDICARE

## 2023-12-22 VITALS
TEMPERATURE: 98 F | OXYGEN SATURATION: 97 % | SYSTOLIC BLOOD PRESSURE: 184 MMHG | WEIGHT: 212.19 LBS | HEIGHT: 74 IN | RESPIRATION RATE: 16 BRPM | DIASTOLIC BLOOD PRESSURE: 102 MMHG | HEART RATE: 97 BPM | BODY MASS INDEX: 27.23 KG/M2

## 2023-12-22 DIAGNOSIS — Z93.3 COLOSTOMY PRESENT: ICD-10-CM

## 2023-12-22 DIAGNOSIS — E86.0 DEHYDRATION: ICD-10-CM

## 2023-12-22 DIAGNOSIS — N18.32 TYPE 2 DIABETES MELLITUS WITH STAGE 3B CHRONIC KIDNEY DISEASE, WITHOUT LONG-TERM CURRENT USE OF INSULIN: Primary | ICD-10-CM

## 2023-12-22 DIAGNOSIS — E11.22 TYPE 2 DIABETES MELLITUS WITH STAGE 3B CHRONIC KIDNEY DISEASE, WITHOUT LONG-TERM CURRENT USE OF INSULIN: Primary | ICD-10-CM

## 2023-12-22 PROCEDURE — 25000003 PHARM REV CODE 250: Mod: HCNC | Performed by: NURSE PRACTITIONER

## 2023-12-22 PROCEDURE — 96365 THER/PROPH/DIAG IV INF INIT: CPT | Mod: HCNC

## 2023-12-22 RX ORDER — SODIUM CHLORIDE 0.9 % (FLUSH) 0.9 %
10 SYRINGE (ML) INJECTION
Status: CANCELLED | OUTPATIENT
Start: 2023-12-26

## 2023-12-22 RX ORDER — HEPARIN 100 UNIT/ML
500 SYRINGE INTRAVENOUS
Status: CANCELLED | OUTPATIENT
Start: 2023-12-26

## 2023-12-22 RX ADMIN — SODIUM CHLORIDE 1000 ML: 9 INJECTION, SOLUTION INTRAVENOUS at 08:12

## 2023-12-23 NOTE — ASSESSMENT & PLAN NOTE
Last A1c was 6.7.    Continue current diabetes regimen.  Discussed risk of low blood sugars with glimepiride and low kidney function.     Discussed goal A1c of 7-7.5%  Check blood sugars before meals and bedtime.  Call if blood sugars are persistently less than 70 or greater than 200.     Discussed importance of diet and lifestyle modifications for diabetes management    Hypoglycemia management discussed. Carry glucose tablets or snacks at all times.     Discussed risk of complications with uncontrolled diabetes.    Complications:  Follow up for regular diabetes eye exam  Daily self examination of feet.  CKD 4: Monitor. Continue follow up with nephrologist.

## 2023-12-26 ENCOUNTER — INFUSION (OUTPATIENT)
Dept: INFECTIOUS DISEASES | Facility: HOSPITAL | Age: 73
End: 2023-12-26
Payer: MEDICARE

## 2023-12-26 VITALS
HEART RATE: 66 BPM | RESPIRATION RATE: 18 BRPM | OXYGEN SATURATION: 99 % | SYSTOLIC BLOOD PRESSURE: 179 MMHG | HEIGHT: 72 IN | WEIGHT: 207.25 LBS | DIASTOLIC BLOOD PRESSURE: 102 MMHG | TEMPERATURE: 98 F | BODY MASS INDEX: 28.07 KG/M2

## 2023-12-26 DIAGNOSIS — N18.32 TYPE 2 DIABETES MELLITUS WITH STAGE 3B CHRONIC KIDNEY DISEASE, WITHOUT LONG-TERM CURRENT USE OF INSULIN: Primary | ICD-10-CM

## 2023-12-26 DIAGNOSIS — Z93.3 COLOSTOMY PRESENT: ICD-10-CM

## 2023-12-26 DIAGNOSIS — E86.0 DEHYDRATION: ICD-10-CM

## 2023-12-26 DIAGNOSIS — E11.22 TYPE 2 DIABETES MELLITUS WITH STAGE 3B CHRONIC KIDNEY DISEASE, WITHOUT LONG-TERM CURRENT USE OF INSULIN: Primary | ICD-10-CM

## 2023-12-26 PROCEDURE — 96360 HYDRATION IV INFUSION INIT: CPT | Mod: HCNC

## 2023-12-26 PROCEDURE — 25000003 PHARM REV CODE 250: Mod: HCNC | Performed by: NURSE PRACTITIONER

## 2023-12-26 RX ORDER — HEPARIN 100 UNIT/ML
500 SYRINGE INTRAVENOUS
Status: CANCELLED | OUTPATIENT
Start: 2023-12-29

## 2023-12-26 RX ORDER — SODIUM CHLORIDE 0.9 % (FLUSH) 0.9 %
10 SYRINGE (ML) INJECTION
Status: CANCELLED | OUTPATIENT
Start: 2023-12-29

## 2023-12-26 RX ADMIN — SODIUM CHLORIDE 1000 ML: 9 INJECTION, SOLUTION INTRAVENOUS at 09:12

## 2023-12-28 ENCOUNTER — OFFICE VISIT (OUTPATIENT)
Dept: CARDIOLOGY | Facility: CLINIC | Age: 73
End: 2023-12-28
Payer: MEDICARE

## 2023-12-28 VITALS
DIASTOLIC BLOOD PRESSURE: 74 MMHG | SYSTOLIC BLOOD PRESSURE: 111 MMHG | HEIGHT: 72 IN | BODY MASS INDEX: 28.27 KG/M2 | HEART RATE: 96 BPM | OXYGEN SATURATION: 98 % | WEIGHT: 208.75 LBS

## 2023-12-28 DIAGNOSIS — I71.21 ASCENDING AORTIC ANEURYSM, UNSPECIFIED WHETHER RUPTURED: ICD-10-CM

## 2023-12-28 DIAGNOSIS — E78.2 MIXED HYPERLIPIDEMIA: ICD-10-CM

## 2023-12-28 DIAGNOSIS — I25.10 CORONARY ARTERY DISEASE INVOLVING NATIVE CORONARY ARTERY OF NATIVE HEART WITHOUT ANGINA PECTORIS: ICD-10-CM

## 2023-12-28 DIAGNOSIS — R94.31 PROLONGED QT INTERVAL: ICD-10-CM

## 2023-12-28 DIAGNOSIS — I47.29 PAROXYSMAL VENTRICULAR TACHYCARDIA: ICD-10-CM

## 2023-12-28 DIAGNOSIS — Z95.828 PRESENCE OF ARTERIAL STENT: ICD-10-CM

## 2023-12-28 DIAGNOSIS — R79.89 ELEVATED TROPONIN: ICD-10-CM

## 2023-12-28 DIAGNOSIS — R94.31 ABNORMAL EKG: Primary | ICD-10-CM

## 2023-12-28 DIAGNOSIS — I47.20 VT (VENTRICULAR TACHYCARDIA): ICD-10-CM

## 2023-12-28 DIAGNOSIS — I45.2 BIFASCICULAR BLOCK: ICD-10-CM

## 2023-12-28 DIAGNOSIS — R07.9 CHEST PAIN, UNSPECIFIED TYPE: ICD-10-CM

## 2023-12-28 DIAGNOSIS — I16.0 HYPERTENSIVE URGENCY: ICD-10-CM

## 2023-12-28 DIAGNOSIS — I10 ESSENTIAL HYPERTENSION: ICD-10-CM

## 2023-12-28 DIAGNOSIS — I70.0 AORTIC ATHEROSCLEROSIS: ICD-10-CM

## 2023-12-28 PROCEDURE — 1160F PR REVIEW ALL MEDS BY PRESCRIBER/CLIN PHARMACIST DOCUMENTED: ICD-10-PCS | Mod: HCNC,CPTII,S$GLB, | Performed by: INTERNAL MEDICINE

## 2023-12-28 PROCEDURE — 3044F HG A1C LEVEL LT 7.0%: CPT | Mod: HCNC,CPTII,S$GLB, | Performed by: INTERNAL MEDICINE

## 2023-12-28 PROCEDURE — 1160F RVW MEDS BY RX/DR IN RCRD: CPT | Mod: HCNC,CPTII,S$GLB, | Performed by: INTERNAL MEDICINE

## 2023-12-28 PROCEDURE — 1159F PR MEDICATION LIST DOCUMENTED IN MEDICAL RECORD: ICD-10-PCS | Mod: HCNC,CPTII,S$GLB, | Performed by: INTERNAL MEDICINE

## 2023-12-28 PROCEDURE — 1101F PT FALLS ASSESS-DOCD LE1/YR: CPT | Mod: HCNC,CPTII,S$GLB, | Performed by: INTERNAL MEDICINE

## 2023-12-28 PROCEDURE — 1126F AMNT PAIN NOTED NONE PRSNT: CPT | Mod: HCNC,CPTII,S$GLB, | Performed by: INTERNAL MEDICINE

## 2023-12-28 PROCEDURE — 1111F PR DISCHARGE MEDS RECONCILED W/ CURRENT OUTPATIENT MED LIST: ICD-10-PCS | Mod: HCNC,CPTII,S$GLB, | Performed by: INTERNAL MEDICINE

## 2023-12-28 PROCEDURE — 1111F DSCHRG MED/CURRENT MED MERGE: CPT | Mod: HCNC,CPTII,S$GLB, | Performed by: INTERNAL MEDICINE

## 2023-12-28 PROCEDURE — 3078F PR MOST RECENT DIASTOLIC BLOOD PRESSURE < 80 MM HG: ICD-10-PCS | Mod: HCNC,CPTII,S$GLB, | Performed by: INTERNAL MEDICINE

## 2023-12-28 PROCEDURE — 3060F POS MICROALBUMINURIA REV: CPT | Mod: HCNC,CPTII,S$GLB, | Performed by: INTERNAL MEDICINE

## 2023-12-28 PROCEDURE — 3066F NEPHROPATHY DOC TX: CPT | Mod: HCNC,CPTII,S$GLB, | Performed by: INTERNAL MEDICINE

## 2023-12-28 PROCEDURE — 3060F PR POS MICROALBUMINURIA RESULT DOCUMENTED/REVIEW: ICD-10-PCS | Mod: HCNC,CPTII,S$GLB, | Performed by: INTERNAL MEDICINE

## 2023-12-28 PROCEDURE — 3008F PR BODY MASS INDEX (BMI) DOCUMENTED: ICD-10-PCS | Mod: HCNC,CPTII,S$GLB, | Performed by: INTERNAL MEDICINE

## 2023-12-28 PROCEDURE — 1126F PR PAIN SEVERITY QUANTIFIED, NO PAIN PRESENT: ICD-10-PCS | Mod: HCNC,CPTII,S$GLB, | Performed by: INTERNAL MEDICINE

## 2023-12-28 PROCEDURE — 1101F PR PT FALLS ASSESS DOC 0-1 FALLS W/OUT INJ PAST YR: ICD-10-PCS | Mod: HCNC,CPTII,S$GLB, | Performed by: INTERNAL MEDICINE

## 2023-12-28 PROCEDURE — 3008F BODY MASS INDEX DOCD: CPT | Mod: HCNC,CPTII,S$GLB, | Performed by: INTERNAL MEDICINE

## 2023-12-28 PROCEDURE — 3074F PR MOST RECENT SYSTOLIC BLOOD PRESSURE < 130 MM HG: ICD-10-PCS | Mod: HCNC,CPTII,S$GLB, | Performed by: INTERNAL MEDICINE

## 2023-12-28 PROCEDURE — 99999 PR PBB SHADOW E&M-EST. PATIENT-LVL IV: ICD-10-PCS | Mod: PBBFAC,HCNC,, | Performed by: INTERNAL MEDICINE

## 2023-12-28 PROCEDURE — 1159F MED LIST DOCD IN RCRD: CPT | Mod: HCNC,CPTII,S$GLB, | Performed by: INTERNAL MEDICINE

## 2023-12-28 PROCEDURE — 3074F SYST BP LT 130 MM HG: CPT | Mod: HCNC,CPTII,S$GLB, | Performed by: INTERNAL MEDICINE

## 2023-12-28 PROCEDURE — 3078F DIAST BP <80 MM HG: CPT | Mod: HCNC,CPTII,S$GLB, | Performed by: INTERNAL MEDICINE

## 2023-12-28 PROCEDURE — 3066F PR DOCUMENTATION OF TREATMENT FOR NEPHROPATHY: ICD-10-PCS | Mod: HCNC,CPTII,S$GLB, | Performed by: INTERNAL MEDICINE

## 2023-12-28 PROCEDURE — 99999 PR PBB SHADOW E&M-EST. PATIENT-LVL IV: CPT | Mod: PBBFAC,HCNC,, | Performed by: INTERNAL MEDICINE

## 2023-12-28 PROCEDURE — 3288F PR FALLS RISK ASSESSMENT DOCUMENTED: ICD-10-PCS | Mod: HCNC,CPTII,S$GLB, | Performed by: INTERNAL MEDICINE

## 2023-12-28 PROCEDURE — 3044F PR MOST RECENT HEMOGLOBIN A1C LEVEL <7.0%: ICD-10-PCS | Mod: HCNC,CPTII,S$GLB, | Performed by: INTERNAL MEDICINE

## 2023-12-28 PROCEDURE — 3288F FALL RISK ASSESSMENT DOCD: CPT | Mod: HCNC,CPTII,S$GLB, | Performed by: INTERNAL MEDICINE

## 2023-12-28 PROCEDURE — 99215 PR OFFICE/OUTPT VISIT, EST, LEVL V, 40-54 MIN: ICD-10-PCS | Mod: HCNC,S$GLB,, | Performed by: INTERNAL MEDICINE

## 2023-12-28 PROCEDURE — 99215 OFFICE O/P EST HI 40 MIN: CPT | Mod: HCNC,S$GLB,, | Performed by: INTERNAL MEDICINE

## 2023-12-28 NOTE — PROGRESS NOTES
"[facility] [address] [address]  Cardiology Clinic Note      Chief Complaint  Chief Complaint   Patient presents with    Follow-up       HPI:    72-year-old male with a past medical history SBO, s/p colostomy, GERD, parotid mass s/p resection, BPH & prostate CA s/p TURP, CKD 4, pleural plaque, ("neurogenic") orthostatic hypotension, hypertension, hyperlipidemia, DM2, carotid stenosis not hemodynamically significant ultrasound 03/2023, ascending thoracic aorta 4.4 cm CT 2018 then 4.7 cm in 2023, lower extremity arterial duplex 09/2022 no evidence of hemodynamically significant stenosis nonvisualization of the peroneal arteries prior SAKSHI 2019 normal, CAD status post PCI to mid LAD 2017 followed by cardiac catheterization 2021 patent stent nonobstructive disease prior MPI 2018 inferolateral ischemia, sustained ventricular tachycardia documented on discharge summary 4/2021 on amiodarone previous loop recorder implantation 2021 presumed link transmission showed several episodes of ventricular tachycardia with longest episode 36 beats also documented either atrial fibrillation RVR with aberrant conduction or AV radha reentry tachycardia (this was documented by an outside provider note scan 03/2021), echo 09/2023 normal EF grade 1 diastolic dysfunction mild LVH normal RV prior echo 12/2022 normal EF mild LVH diastolic function indeterminate normal RV PASP 32+ CVP which could not be determined ascending aorta mildly dilated      Hospitalized with cholestatic jaundice s/p cholecystectomy with lysis of adhesions 2/2023     Nephrology has IVF scheduled twice weekly secondary to losses from ostomy     Neurology is following as well suspected autonomic dysfunction with orthostatic component      Seen by gastroenterology as well to add bulk to stool      Last note from outside cardiology which states patient has pAF in note scan 2/3/2023   Seen by electrophysiology amiodarone was held and suggested loop recorder.  AF episodes " seen on previous monitoring were most consistent with artifact.     Patient has been managing his labile hypertension with p.r.n. hydralazine but he was still taking Toprol for other indications     PCP placed patient on oxybutynin for high ileostomy output      Frequently seen in the emergency department for hypotension/volume depletion  PEC'd for suicidal ideation at 1 of these ER visits     Seen by Cardiothoracic surgery for aortic aneurysm     Most recently consulted Endocrinology to rule out adrenal insufficiency as the patient is no longer tolerating his metoprolol.  Appears that endocrinology has not addressed this as of yet.     The patient was admitted 11/2023 for lightheadedness/hypotension which has resolved following IVF.  Patient reports syncopal event while in emergency room.  Subsequent ER visit with syncope.  Data reviewed.    Blood pressure looks good today  Beginning a trial of pyridostigmine per Neurology    Medications  Current Outpatient Medications   Medication Sig Dispense Refill    aspirin (ECOTRIN) 81 MG EC tablet Take 1 tablet (81 mg total) by mouth once daily. 30 tablet 11    calcium-vitamin D3 (OS-DAKOTA 500 + D3) 500 mg-5 mcg (200 unit) per tablet Take 1 tablet by mouth once daily. 30 tablet 1    fludrocortisone (FLORINEF) 0.1 mg Tab Take 1 tablet (100 mcg total) by mouth once daily. 180 tablet 0    gabapentin (NEURONTIN) 100 MG capsule Take 2 capsules (200 mg total) by mouth 3 (three) times daily. 180 capsule 1    glimepiride (AMARYL) 4 MG tablet TAKE 1 TABLET (4 MG TOTAL) BY MOUTH 2 (TWO) TIMES DAILY BEFORE MEALS. 180 tablet 1    hydrALAZINE (APRESOLINE) 25 MG tablet TAKE 1 TABLET THREE TIMES DAILY AS NEEDED FOR HIGH BLOOD PRESSURE: SYSTOLIC ABOVE 180 OR DIASTOLIC ABOVE 100. 90 tablet 1    magnesium oxide (MAG-OX) 400 mg (241.3 mg magnesium) tablet Take 1 tablet (400 mg total) by mouth once daily. 30 tablet 2    metoprolol succinate (TOPROL-XL) 25 MG 24 hr tablet Take 1 tablet (25 mg  total) by mouth once daily. Do not take if there is hypotension (low blood pressure) 90 tablet 1    pantoprazole (PROTONIX) 40 MG tablet Take 1 tablet (40 mg total) by mouth once daily. 90 tablet 0    pravastatin (PRAVACHOL) 40 MG tablet Take 1 tablet (40 mg total) by mouth once daily. 90 tablet 3    pyRIDostigmine bromide 30 mg Tab Take 30 mg by mouth every 8 (eight) hours. 90 tablet 1    sodium bicarbonate 650 MG tablet Take 1 tablet (650 mg total) by mouth once daily. 90 tablet 3    ACCU-CHEK PAUL CONTROL SOLN Soln 1 drop by NOT APPLICABLE route once daily.      ACCU-CHEK GUIDE TEST STRIPS Strp TEST BLOOD SUGAR THREE TIMES DAILY 300 strip 10    ACCU-CHEK SOFTCLIX LANCETS Misc TEST BLOOD SUGAR THREE TIMES DAILY 300 each 3    blood-glucose meter (ACCU-CHEK GUIDE GLUCOSE METER) [facility] [place] BID 1 each 0    capsaicin 0.1 % Crea Apply painful joint TID 56.6 g 1    cholestyramine-aspartame (QUESTRAN LIGHT) 4 gram PwPk Take 1 packet (4 g total) by mouth 3 (three) times daily with meals. 270 packet 1    colchicine (COLCRYS) 0.6 mg tablet Take 1 tablet (0.6 mg total) by mouth daily as needed (Gout Flare). 10 tablet 0    DROPSAFE ALCOHOL PREP PADS PadM USE TOPICALLY 5 (FIVE) TIMES DAILY. 200 each 2    LIDOcaine (LIDODERM) 5 % Place 1 patch onto the skin as needed. Remove & Discard patch within 12 hours or as directed by MD 30 patch 0    midodrine (PROAMATINE) 10 MG tablet Take 1 tablet (10 mg total) by mouth daily as needed (Hypotension). 90 tablet 3    predniSONE (DELTASONE) 20 MG tablet Take 1 tablet (20 mg total) by mouth 2 (two) times daily as needed (Gout Flare). 8 tablet 0     No current facility-administered medications for this visit.     Facility-Administered Medications Ordered in Other Visits   Medication Dose Route Frequency Provider Last Rate Last Admin    sodium chloride 0.9% in 1,000 mL infusion   Intravenous Continuous Order, Paper            History  Past Medical History:   Diagnosis Date    Basal cell  carcinoma     BPH (benign prostatic hyperplasia)     s/p TURP    Carotid stenosis     Chronic kidney disease     Claudication     Coronary artery disease     DDD (degenerative disc disease) 10/21/2013    Diabetes mellitus with renal complications     Disc disease, degenerative, cervical     Encounter for blood transfusion     GERD (gastroesophageal reflux disease)     Gout, chronic     History of ulcerative colitis     s/p colectomy and ileostomy    HLD (hyperlipidemia)     HTN (hypertension)     Ileostomy in place 1982    RBBB     Squamous cell carcinoma 03/08/2018    Left superior helix near insertion    Squamous cell carcinoma 04/12/2018    Left forearm x 5    Ventricular tachycardia      Past Surgical History:   Procedure Laterality Date    cardiac stents      CATARACT EXTRACTION Bilateral     CERVICAL FUSION      CHOLECYSTECTOMY N/A 2/14/2023    Procedure: CHOLECYSTECTOMY;  Surgeon: Mike Joyce MD;  Location: [facility] OR;  Service: General;  Laterality: N/A;  very high probabilty of converison to open    colectomy and ileostomy  1985    ENDOSCOPIC ULTRASOUND OF UPPER GASTROINTESTINAL TRACT N/A 2/10/2023    Procedure: ULTRASOUND, UPPER GI TRACT, ENDOSCOPIC;  Surgeon: Poli Fuller MD;  Location: [facility] ENDO;  Service: Endoscopy;  Laterality: N/A;    ERCP N/A 2/10/2023    Procedure: ERCP (ENDOSCOPIC RETROGRADE CHOLANGIOPANCREATOGRAPHY);  Surgeon: Poli Fuller MD;  Location: [facility] ENDO;  Service: Endoscopy;  Laterality: N/A;    EXCISION OF PAROTID GLAND Left 12/18/2020    Procedure: EXCISION, PAROTID GLAND;  Surgeon: Michael Pinzon MD;  Location: [address];  Service: ENT;  Laterality: Left;    INSERTION OF IMPLANTABLE LOOP RECORDER  06/07/2021    INSERTION OF IMPLANTABLE LOOP RECORDER Left 6/7/2021    Procedure: INSERTION, IMPLANTABLE LOOP RECORDER;  Surgeon: Romario Dao MD;  Location: [facility] CATH LAB;  Service: Cardiology;  Laterality: Left;    LEFT HEART CATHETERIZATION Right 4/15/2021     Procedure: CATHETERIZATION, HEART, LEFT;  Surgeon: Marcio Jones MD;  Location: [facility] CATH LAB;  Service: Cardiology;  Laterality: Right;    LYSIS OF ADHESIONS N/A 11/9/2020    Procedure: LYSIS, ADHESIONS,  ERAS low;  Surgeon: CED Haley MD;  Location: [facility] OR 2ND FLR;  Service: Colon and Rectal;  Laterality: N/A;    LYSIS OF ADHESIONS N/A 2/14/2023    Procedure: LYSIS, ADHESIONS;  Surgeon: Mike Joyce MD;  Location: [facility] OR;  Service: General;  Laterality: N/A;    POUCHOSCOPY N/A 4/6/2022    Procedure: ENDOSCOPY, POUCH, SMALL INTESTINE, DIAGNOSTIC;  Surgeon: Dieter Juarez MD;  Location: [facility] ENDO (2ND FLR);  Service: Endoscopy;  Laterality: N/A;    REPAIR, HERNIA, PARASTOMAL N/A 11/9/2020    Procedure: REPAIR, HERNIA, PARASTOMAL;  Surgeon: CED Haley MD;  Location: [facility] OR 2ND FLR;  Service: Colon and Rectal;  Laterality: N/A;    TRANSURETHRAL RESECTION OF PROSTATE (TURP) WITHOUT USE OF LASER N/A 1/23/2019    Procedure: TURP, WITHOUT USING LASER BIPOLAR;  Surgeon: Catarino Mota MD;  Location: [facility] OR 1ST FLR;  Service: Urology;  Laterality: N/A;  1.5 HOURS     Social History     Socioeconomic History    Marital status:     Number of children: 1   Occupational History    Occupation:  x 44 years     Comment: Retired    Occupation: [place]    Tobacco Use    Smoking status: Never    Smokeless tobacco: Never   Substance and Sexual Activity    Alcohol use: No    Drug use: No    Sexual activity: Not Currently     Partners: Female     Social Determinants of Health     Financial Resource Strain: Low Risk  (11/27/2023)    Overall Financial Resource Strain (CARDIA)     Difficulty of Paying Living Expenses: Not hard at all   Food Insecurity: No Food Insecurity (11/27/2023)    Hunger Vital Sign     Worried About Running Out of Food in the Last Year: Never true     Ran Out of Food in the Last Year: Never true   Transportation Needs: No Transportation Needs  (11/27/2023)    PRAPARE - Transportation     Lack of Transportation (Medical): No     Lack of Transportation (Non-Medical): No   Physical Activity: Sufficiently Active (5/28/2023)    Exercise Vital Sign     Days of Exercise per Week: 7 days     Minutes of Exercise per Session: 60 min   Stress: No Stress Concern Present (11/27/2023)    [place] [employer] - Occupational Stress Questionnaire     Feeling of Stress : Only a little   Social Connections: Moderately Integrated (11/27/2023)    Social Connection and Isolation Panel [NHANES]     Frequency of Communication with Friends and Family: More than three times a week     Frequency of Social Gatherings with Friends and Family: More than three times a week     Attends Bahai Services: 1 to 4 times per year     Active Member of Clubs or Organizations: Yes     Attends Club or Organization Meetings: 1 to 4 times per year     Marital Status:    Housing Stability: Low Risk  (11/27/2023)    Housing Stability Vital Sign     Unable to Pay for Housing in the Last Year: No     Number of Places Lived in the Last Year: 1     Unstable Housing in the Last Year: No     Family History   Problem Relation Age of Onset    Cancer Father     Diabetes Father     Dementia Mother     Melanoma Neg Hx     Hypertension Neg Hx     Arthritis Neg Hx         Allergies  Review of patient's allergies indicates:   Allergen Reactions    Crestor [rosuvastatin]     Lipitor [atorvastatin]        Review of Systems   Review of Systems   Constitutional: Negative for fever.   HENT:  Negative for nosebleeds.    Eyes:  Negative for visual disturbance.   Cardiovascular:  Negative for chest pain, claudication, dyspnea on exertion, palpitations and syncope.   Respiratory:  Negative for cough, hemoptysis and wheezing.    Endocrine: Negative for cold intolerance, heat intolerance, polyphagia and polyuria.   Hematologic/Lymphatic: Negative for bleeding problem.   Skin:  Negative for rash.    Musculoskeletal:  Negative for myalgias.   Gastrointestinal:  Negative for hematemesis, hematochezia, nausea and vomiting.   Genitourinary:  Negative for dysuria.   Neurological:  Negative for focal weakness and sensory change.   Psychiatric/Behavioral:  Negative for altered mental status.        Physical Exam  Vitals:    12/28/23 1050   BP: 111/74   Pulse: 96       Wt Readings from Last 1 Encounters:   12/28/23 94.7 kg (208 lb 12.4 oz)     Physical Exam  HENT:      Head: Normocephalic and atraumatic.      Mouth/Throat:      Mouth: Mucous membranes are moist.   Eyes:      Extraocular Movements: Extraocular movements intact.      Pupils: Pupils are equal, round, and reactive to light.   Neck:      Vascular: No carotid bruit or JVD.   Cardiovascular:      Rate and Rhythm: Normal rate and regular rhythm.      Pulses: Normal pulses and intact distal pulses.      Heart sounds: Normal heart sounds. No murmur heard.     No friction rub. No gallop.   Pulmonary:      Effort: Pulmonary effort is normal.      Breath sounds: Normal breath sounds.   Abdominal:      Tenderness: There is no abdominal tenderness. There is no guarding or rebound.   Musculoskeletal:      Right lower leg: No edema.      Left lower leg: No edema.   Skin:     General: Skin is warm and dry.      Capillary Refill: Capillary refill takes less than 2 seconds.   Neurological:      General: No focal deficit present.      Mental Status: He is alert and oriented to person, place, and time.   Psychiatric:         Mood and Affect: Mood normal.         Labs  Admission on 12/07/2023, Discharged on 12/07/2023   Component Date Value Ref Range Status    WBC 12/07/2023 8.79  3.90 - 12.70 K/uL Final    RBC 12/07/2023 4.38 (L)  4.60 - 6.20 M/uL Final    Hemoglobin 12/07/2023 12.6 (L)  14.0 - 18.0 g/dL Final    Hematocrit 12/07/2023 39.7 (L)  40.0 - 54.0 % Final    MCV 12/07/2023 91  82 - 98 fL Final    MCH 12/07/2023 28.8  27.0 - 31.0 pg Final    MCHC 12/07/2023 31.7  (L)  32.0 - 36.0 g/dL Final    RDW 12/07/2023 14.5  11.5 - 14.5 % Final    Platelets 12/07/2023 194  150 - 450 K/uL Final    MPV 12/07/2023 10.3  9.2 - 12.9 fL Final    Immature Granulocytes 12/07/2023 0.6 (H)  0.0 - 0.5 % Final    Gran # (ANC) 12/07/2023 7.0  1.8 - 7.7 K/uL Final    Immature Grans (Abs) 12/07/2023 0.05 (H)  0.00 - 0.04 K/uL Final    Comment: Mild elevation in immature granulocytes is non specific and   can be seen in a variety of conditions including stress response,   acute inflammation, trauma and pregnancy. Correlation with other   laboratory and clinical findings is essential.      Lymph # 12/07/2023 1.2  1.0 - 4.8 K/uL Final    Mono # 12/07/2023 0.4  0.3 - 1.0 K/uL Final    Eos # 12/07/2023 0.1  0.0 - 0.5 K/uL Final    Baso # 12/07/2023 0.02  0.00 - 0.20 K/uL Final    nRBC 12/07/2023 0  0 /100 WBC Final    Gran % 12/07/2023 80.1 (H)  38.0 - 73.0 % Final    Lymph % 12/07/2023 13.5 (L)  18.0 - 48.0 % Final    Mono % 12/07/2023 4.7  4.0 - 15.0 % Final    Eosinophil % 12/07/2023 0.9  0.0 - 8.0 % Final    Basophil % 12/07/2023 0.2  0.0 - 1.9 % Final    Differential Method 12/07/2023 Automated   Final    Sodium 12/07/2023 141  136 - 145 mmol/L Final    Potassium 12/07/2023 4.4  3.5 - 5.1 mmol/L Final    Chloride 12/07/2023 107  95 - 110 mmol/L Final    CO2 12/07/2023 20 (L)  23 - 29 mmol/L Final    Glucose 12/07/2023 181 (H)  70 - 110 mg/dL Final    BUN 12/07/2023 44 (H)  8 - 23 mg/dL Final    Creatinine 12/07/2023 2.6 (H)  0.5 - 1.4 mg/dL Final    Calcium 12/07/2023 9.4  8.7 - 10.5 mg/dL Final    Total Protein 12/07/2023 7.3  6.0 - 8.4 g/dL Final    Albumin 12/07/2023 3.6  3.5 - 5.2 g/dL Final    Total Bilirubin 12/07/2023 0.5  0.1 - 1.0 mg/dL Final    Comment: For infants and newborns, interpretation of results should be based  on gestational age, weight and in agreement with clinical  observations.    Premature Infant recommended reference ranges:  Up to 24 hours.............<8.0 mg/dL  Up to 48  hours............<12.0 mg/dL  3-5 days..................<15.0 mg/dL  6-29 days.................<15.0 mg/dL      Alkaline Phosphatase 12/07/2023 121  55 - 135 U/L Final    AST 12/07/2023 23  10 - 40 U/L Final    ALT 12/07/2023 22  10 - 44 U/L Final    eGFR 12/07/2023 25.1 (A)  >60 mL/min/1.73 m^2 Final    Anion Gap 12/07/2023 14  8 - 16 mmol/L Final    Troponin I 12/07/2023 0.014  0.000 - 0.026 ng/mL Final    Comment: The reference interval for Troponin I represents the 99th percentile   cutoff   for our facility and is consistent with 3rd generation assay   performance.      CPK 12/07/2023 49  20 - 200 U/L Final    Lipase 12/07/2023 101 (H)  4 - 60 U/L Final   Clinical Support on 11/30/2023   Component Date Value Ref Range Status    ICD Shock 11/30/2023 No   Final    Device Type 11/30/2023 Loop   Final    AF [place] % 11/30/2023 5.2   Final   Admission on 11/26/2023, Discharged on 11/28/2023   Component Date Value Ref Range Status    WBC 11/26/2023 12.19  3.90 - 12.70 K/uL Final    RBC 11/26/2023 4.72  4.60 - 6.20 M/uL Final    Hemoglobin 11/26/2023 13.5 (L)  14.0 - 18.0 g/dL Final    Hematocrit 11/26/2023 42.1  40.0 - 54.0 % Final    MCV 11/26/2023 89  82 - 98 fL Final    MCH 11/26/2023 28.6  27.0 - 31.0 pg Final    MCHC 11/26/2023 32.1  32.0 - 36.0 g/dL Final    RDW 11/26/2023 14.5  11.5 - 14.5 % Final    Platelets 11/26/2023 256  150 - 450 K/uL Final    MPV 11/26/2023 10.8  9.2 - 12.9 fL Final    Immature Granulocytes 11/26/2023 0.3  0.0 - 0.5 % Final    Gran # (ANC) 11/26/2023 8.4 (H)  1.8 - 7.7 K/uL Final    Immature Grans (Abs) 11/26/2023 0.04  0.00 - 0.04 K/uL Final    Comment: Mild elevation in immature granulocytes is non specific and   can be seen in a variety of conditions including stress response,   acute inflammation, trauma and pregnancy. Correlation with other   laboratory and clinical findings is essential.      Lymph # 11/26/2023 2.9  1.0 - 4.8 K/uL Final    Mono # 11/26/2023 0.7  0.3 - 1.0 K/uL  Final    Eos # 11/26/2023 0.1  0.0 - 0.5 K/uL Final    Baso # 11/26/2023 0.05  0.00 - 0.20 K/uL Final    nRBC 11/26/2023 0  0 /100 WBC Final    Gran % 11/26/2023 69.1  38.0 - 73.0 % Final    Lymph % 11/26/2023 23.9  18.0 - 48.0 % Final    Mono % 11/26/2023 5.4  4.0 - 15.0 % Final    Eosinophil % 11/26/2023 0.9  0.0 - 8.0 % Final    Basophil % 11/26/2023 0.4  0.0 - 1.9 % Final    Differential Method 11/26/2023 Automated   Final    Sodium 11/26/2023 140  136 - 145 mmol/L Final    Potassium 11/26/2023 2.9 (L)  3.5 - 5.1 mmol/L Final    Chloride 11/26/2023 105  95 - 110 mmol/L Final    CO2 11/26/2023 19 (L)  23 - 29 mmol/L Final    Glucose 11/26/2023 119 (H)  70 - 110 mg/dL Final    BUN 11/26/2023 49 (H)  8 - 23 mg/dL Final    Creatinine 11/26/2023 3.5 (H)  0.5 - 1.4 mg/dL Final    Calcium 11/26/2023 9.4  8.7 - 10.5 mg/dL Final    Total Protein 11/26/2023 8.1  6.0 - 8.4 g/dL Final    Albumin 11/26/2023 3.9  3.5 - 5.2 g/dL Final    Total Bilirubin 11/26/2023 0.9  0.1 - 1.0 mg/dL Final    Comment: For infants and newborns, interpretation of results should be based  on gestational age, weight and in agreement with clinical  observations.    Premature Infant recommended reference ranges:  Up to 24 hours.............<8.0 mg/dL  Up to 48 hours............<12.0 mg/dL  3-5 days..................<15.0 mg/dL  6-29 days.................<15.0 mg/dL      Alkaline Phosphatase 11/26/2023 128  55 - 135 U/L Final    AST 11/26/2023 30  10 - 40 U/L Final    ALT 11/26/2023 26  10 - 44 U/L Final    eGFR 11/26/2023 17.6 (A)  >60 mL/min/1.73 m^2 Final    Anion Gap 11/26/2023 16  8 - 16 mmol/L Final    Troponin I 11/26/2023 0.076 (H)  0.000 - 0.026 ng/mL Final    Comment: The reference interval for Troponin I represents the 99th percentile   cutoff   for our facility and is consistent with 3rd generation assay   performance.      POCT Glucose 11/26/2023 100  70 - 110 mg/dL Final    Blood Culture, Routine 11/26/2023 No growth after 5 days.    Final    Blood Culture, Routine 11/26/2023 No growth after 5 days.   Final    Specimen UA 11/26/2023 Urine, Clean Catch   Final    Color, UA 11/26/2023 Colorless (A)  Yellow, Straw, Stacy Final    Appearance, UA 11/26/2023 Clear  Clear Final    pH, UA 11/26/2023 7.0  5.0 - 8.0 Final    Specific Gravity, UA 11/26/2023 1.005  1.005 - 1.030 Final    Protein, UA 11/26/2023 Negative  Negative Final    Comment: Recommend a 24 hour urine protein or a urine   protein/creatinine ratio if globulin induced proteinuria is  clinically suspected.      Glucose, UA 11/26/2023 1+ (A)  Negative Final    Ketones, UA 11/26/2023 Negative  Negative Final    Bilirubin (UA) 11/26/2023 Negative  Negative Final    Occult Blood UA 11/26/2023 Negative  Negative Final    Nitrite, UA 11/26/2023 Negative  Negative Final    Urobilinogen, UA 11/26/2023 Negative  Negative EU/dL Final    Leukocytes, UA 11/26/2023 Negative  Negative Final    POC Lactate 11/26/2023 2.6  mmol/L Final    POC Temp 11/26/2023 37.0  C Final    Specimen source 11/26/2023 Venous   Final    FiO2 11/26/2023 21.0  % Final    O2DEVICE 11/26/2023 Room Air   Final    Mode 11/26/2023 None   Final    Cortisol 11/26/2023 18.50  ug/dL Final    Comment: Cortisol Reference Range:  Before 10:00 am: 4.46-22.7 ug/dL  After 5:00 pm:    1.7-14.1 ug/dL      Troponin I 11/26/2023 0.061 (H)  0.000 - 0.026 ng/mL Final    Comment: The reference interval for Troponin I represents the 99th percentile   cutoff   for our facility and is consistent with 3rd generation assay   performance.      POC Lactate 11/26/2023 1.4  mmol/L Final    POC Temp 11/26/2023 37.0  C Final    Specimen source 11/26/2023 Venous   Final    FiO2 11/26/2023 21.0  % Final    O2DEVICE 11/26/2023 Room Air   Final    Mode 11/26/2023 None   Final    POCT Glucose 11/26/2023 136 (H)  70 - 110 mg/dL Final    Sodium 11/27/2023 139  136 - 145 mmol/L Final    Potassium 11/27/2023 4.0  3.5 - 5.1 mmol/L Final    Chloride 11/27/2023 107  95  - 110 mmol/L Final    CO2 11/27/2023 19 (L)  23 - 29 mmol/L Final    Glucose 11/27/2023 128 (H)  70 - 110 mg/dL Final    BUN 11/27/2023 52 (H)  8 - 23 mg/dL Final    Creatinine 11/27/2023 3.5 (H)  0.5 - 1.4 mg/dL Final    Calcium 11/27/2023 8.8  8.7 - 10.5 mg/dL Final    Total Protein 11/27/2023 7.1  6.0 - 8.4 g/dL Final    Albumin 11/27/2023 3.4 (L)  3.5 - 5.2 g/dL Final    Total Bilirubin 11/27/2023 0.7  0.1 - 1.0 mg/dL Final    Comment: For infants and newborns, interpretation of results should be based  on gestational age, weight and in agreement with clinical  observations.    Premature Infant recommended reference ranges:  Up to 24 hours.............<8.0 mg/dL  Up to 48 hours............<12.0 mg/dL  3-5 days..................<15.0 mg/dL  6-29 days.................<15.0 mg/dL      Alkaline Phosphatase 11/27/2023 114  55 - 135 U/L Final    AST 11/27/2023 27  10 - 40 U/L Final    ALT 11/27/2023 23  10 - 44 U/L Final    eGFR 11/27/2023 17.6 (A)  >60 mL/min/1.73 m^2 Final    Anion Gap 11/27/2023 13  8 - 16 mmol/L Final    WBC 11/27/2023 9.36  3.90 - 12.70 K/uL Final    RBC 11/27/2023 4.19 (L)  4.60 - 6.20 M/uL Final    Hemoglobin 11/27/2023 12.1 (L)  14.0 - 18.0 g/dL Final    Hematocrit 11/27/2023 37.8 (L)  40.0 - 54.0 % Final    MCV 11/27/2023 90  82 - 98 fL Final    MCH 11/27/2023 28.9  27.0 - 31.0 pg Final    MCHC 11/27/2023 32.0  32.0 - 36.0 g/dL Final    RDW 11/27/2023 14.6 (H)  11.5 - 14.5 % Final    Platelets 11/27/2023 196  150 - 450 K/uL Final    MPV 11/27/2023 11.4  9.2 - 12.9 fL Final    Immature Granulocytes 11/27/2023 0.4  0.0 - 0.5 % Final    Gran # (ANC) 11/27/2023 7.9 (H)  1.8 - 7.7 K/uL Final    Immature Grans (Abs) 11/27/2023 0.04  0.00 - 0.04 K/uL Final    Comment: Mild elevation in immature granulocytes is non specific and   can be seen in a variety of conditions including stress response,   acute inflammation, trauma and pregnancy. Correlation with other   laboratory and clinical findings is  essential.      Lymph # 11/27/2023 1.2  1.0 - 4.8 K/uL Final    Mono # 11/27/2023 0.3  0.3 - 1.0 K/uL Final    Eos # 11/27/2023 0.0  0.0 - 0.5 K/uL Final    Baso # 11/27/2023 0.02  0.00 - 0.20 K/uL Final    nRBC 11/27/2023 0  0 /100 WBC Final    Gran % 11/27/2023 84.1 (H)  38.0 - 73.0 % Final    Lymph % 11/27/2023 12.4 (L)  18.0 - 48.0 % Final    Mono % 11/27/2023 2.9 (L)  4.0 - 15.0 % Final    Eosinophil % 11/27/2023 0.0  0.0 - 8.0 % Final    Basophil % 11/27/2023 0.2  0.0 - 1.9 % Final    Differential Method 11/27/2023 Automated   Final    Cholesterol 11/27/2023 187  120 - 199 mg/dL Final    Comment: The National Cholesterol Education Program (NCEP) has set the  following guidelines (reference ranges) for Cholesterol:  Optimal.....................<200 mg/dL  Borderline High.............200-239 mg/dL  High........................> or = 240 mg/dL      Triglycerides 11/27/2023 133  30 - 150 mg/dL Final    Comment: The National Cholesterol Education Program (NCEP) has set the  following guidelines (reference values) for triglycerides:  Normal......................<150 mg/dL  Borderline High.............150-199 mg/dL  High........................200-499 mg/dL      HDL 11/27/2023 36 (L)  40 - 75 mg/dL Final    Comment: The National Cholesterol Education Program (NCEP) has set the  following guidelines (reference values) for HDL Cholesterol:  Low...............<40 mg/dL  Optimal...........>60 mg/dL      LDL Cholesterol 11/27/2023 124.4  63.0 - 159.0 mg/dL Final    Comment: The National Cholesterol Education Program (NCEP) has set the  following guidelines (reference values) for LDL Cholesterol:  Optimal.......................<130 mg/dL  Borderline High...............130-159 mg/dL  High..........................160-189 mg/dL  Very High.....................>190 mg/dL      HDL/Cholesterol Ratio 11/27/2023 19.3 (L)  20.0 - 50.0 % Final    Total Cholesterol/HDL Ratio 11/27/2023 5.2 (H)  2.0 - 5.0 Final    Non-HDL Cholesterol  11/27/2023 151  mg/dL Final    Comment: Risk category and Non-HDL cholesterol goals:  Coronary heart disease (CHD)or equivalent (10-year risk of CHD >20%):  Non-HDL cholesterol goal     <130 mg/dL  Two or more CHD risk factors and 10-year risk of CHD <= 20%:  Non-HDL cholesterol goal     <160 mg/dL  0 to 1 CHD risk factor:  Non-HDL cholesterol goal     <190 mg/dL      POCT Glucose 11/27/2023 81  70 - 110 mg/dL Final    POCT Glucose 11/27/2023 103  70 - 110 mg/dL Final    POCT Glucose 11/27/2023 145 (H)  70 - 110 mg/dL Final    POCT Glucose 11/27/2023 129 (H)  70 - 110 mg/dL Final    Sodium 11/28/2023 139  136 - 145 mmol/L Final    Potassium 11/28/2023 3.6  3.5 - 5.1 mmol/L Final    Chloride 11/28/2023 108  95 - 110 mmol/L Final    CO2 11/28/2023 18 (L)  23 - 29 mmol/L Final    Glucose 11/28/2023 123 (H)  70 - 110 mg/dL Final    BUN 11/28/2023 61 (H)  8 - 23 mg/dL Final    Creatinine 11/28/2023 3.5 (H)  0.5 - 1.4 mg/dL Final    Calcium 11/28/2023 8.3 (L)  8.7 - 10.5 mg/dL Final    Total Protein 11/28/2023 6.6  6.0 - 8.4 g/dL Final    Albumin 11/28/2023 3.2 (L)  3.5 - 5.2 g/dL Final    Total Bilirubin 11/28/2023 0.4  0.1 - 1.0 mg/dL Final    Comment: For infants and newborns, interpretation of results should be based  on gestational age, weight and in agreement with clinical  observations.    Premature Infant recommended reference ranges:  Up to 24 hours.............<8.0 mg/dL  Up to 48 hours............<12.0 mg/dL  3-5 days..................<15.0 mg/dL  6-29 days.................<15.0 mg/dL      Alkaline Phosphatase 11/28/2023 110  55 - 135 U/L Final    AST 11/28/2023 24  10 - 40 U/L Final    ALT 11/28/2023 22  10 - 44 U/L Final    eGFR 11/28/2023 17.6 (A)  >60 mL/min/1.73 m^2 Final    Anion Gap 11/28/2023 13  8 - 16 mmol/L Final    WBC 11/28/2023 7.45  3.90 - 12.70 K/uL Final    RBC 11/28/2023 3.91 (L)  4.60 - 6.20 M/uL Final    Hemoglobin 11/28/2023 11.3 (L)  14.0 - 18.0 g/dL Final    Hematocrit 11/28/2023 35.2 (L)   40.0 - 54.0 % Final    MCV 11/28/2023 90  82 - 98 fL Final    MCH 11/28/2023 28.9  27.0 - 31.0 pg Final    MCHC 11/28/2023 32.1  32.0 - 36.0 g/dL Final    RDW 11/28/2023 14.7 (H)  11.5 - 14.5 % Final    Platelets 11/28/2023 172  150 - 450 K/uL Final    MPV 11/28/2023 11.0  9.2 - 12.9 fL Final    Immature Granulocytes 11/28/2023 0.1  0.0 - 0.5 % Final    Gran # (ANC) 11/28/2023 4.8  1.8 - 7.7 K/uL Final    Immature Grans (Abs) 11/28/2023 0.01  0.00 - 0.04 K/uL Final    Comment: Mild elevation in immature granulocytes is non specific and   can be seen in a variety of conditions including stress response,   acute inflammation, trauma and pregnancy. Correlation with other   laboratory and clinical findings is essential.      Lymph # 11/28/2023 2.1  1.0 - 4.8 K/uL Final    Mono # 11/28/2023 0.4  0.3 - 1.0 K/uL Final    Eos # 11/28/2023 0.1  0.0 - 0.5 K/uL Final    Baso # 11/28/2023 0.03  0.00 - 0.20 K/uL Final    nRBC 11/28/2023 0  0 /100 WBC Final    Gran % 11/28/2023 65.0  38.0 - 73.0 % Final    Lymph % 11/28/2023 27.7  18.0 - 48.0 % Final    Mono % 11/28/2023 5.6  4.0 - 15.0 % Final    Eosinophil % 11/28/2023 1.2  0.0 - 8.0 % Final    Basophil % 11/28/2023 0.4  0.0 - 1.9 % Final    Differential Method 11/28/2023 Automated   Final    POCT Glucose 11/28/2023 103  70 - 110 mg/dL Final   Clinical Support on 11/07/2023   Component Date Value Ref Range Status    ICD Shock 11/07/2023 No   Final    Device Type 11/07/2023 Loop   Final    AF [place] % 11/07/2023 2.5   Final   Lab Visit on 11/03/2023   Component Date Value Ref Range Status    Magnesium 11/03/2023 1.6  1.6 - 2.6 mg/dL Final    WBC 11/03/2023 7.48  3.90 - 12.70 K/uL Final    RBC 11/03/2023 3.74 (L)  4.60 - 6.20 M/uL Final    Hemoglobin 11/03/2023 10.9 (L)  14.0 - 18.0 g/dL Final    Hematocrit 11/03/2023 35.1 (L)  40.0 - 54.0 % Final    MCV 11/03/2023 94  82 - 98 fL Final    MCH 11/03/2023 29.1  27.0 - 31.0 pg Final    MCHC 11/03/2023 31.1 (L)  32.0 - 36.0 g/dL  Final    RDW 11/03/2023 14.0  11.5 - 14.5 % Final    Platelets 11/03/2023 266  150 - 450 K/uL Final    MPV 11/03/2023 10.4  9.2 - 12.9 fL Final    Immature Granulocytes 11/03/2023 0.7 (H)  0.0 - 0.5 % Final    Gran # (ANC) 11/03/2023 5.4  1.8 - 7.7 K/uL Final    Immature Grans (Abs) 11/03/2023 0.05 (H)  0.00 - 0.04 K/uL Final    Comment: Mild elevation in immature granulocytes is non specific and   can be seen in a variety of conditions including stress response,   acute inflammation, trauma and pregnancy. Correlation with other   laboratory and clinical findings is essential.      Lymph # 11/03/2023 1.5  1.0 - 4.8 K/uL Final    Mono # 11/03/2023 0.4  0.3 - 1.0 K/uL Final    Eos # 11/03/2023 0.1  0.0 - 0.5 K/uL Final    Baso # 11/03/2023 0.04  0.00 - 0.20 K/uL Final    nRBC 11/03/2023 0  0 /100 WBC Final    Gran % 11/03/2023 72.4  38.0 - 73.0 % Final    Lymph % 11/03/2023 19.8  18.0 - 48.0 % Final    Mono % 11/03/2023 5.1  4.0 - 15.0 % Final    Eosinophil % 11/03/2023 1.5  0.0 - 8.0 % Final    Basophil % 11/03/2023 0.5  0.0 - 1.9 % Final    Differential Method 11/03/2023 Automated   Final    Glucose 11/03/2023 163 (H)  70 - 110 mg/dL Final    Sodium 11/03/2023 140  136 - 145 mmol/L Final    Potassium 11/03/2023 4.2  3.5 - 5.1 mmol/L Final    Chloride 11/03/2023 110  95 - 110 mmol/L Final    CO2 11/03/2023 23  23 - 29 mmol/L Final    BUN 11/03/2023 38 (H)  8 - 23 mg/dL Final    Calcium 11/03/2023 8.7  8.7 - 10.5 mg/dL Final    Creatinine 11/03/2023 2.2 (H)  0.5 - 1.4 mg/dL Final    Albumin 11/03/2023 3.0 (L)  3.5 - 5.2 g/dL Final    Phosphorus 11/03/2023 3.7  2.7 - 4.5 mg/dL Final    eGFR 11/03/2023 30.7 (A)  >60 mL/min/1.73 m^2 Final    Anion Gap 11/03/2023 7 (L)  8 - 16 mmol/L Final   Lab Visit on 10/26/2023   Component Date Value Ref Range Status    WBC 10/26/2023 8.91  3.90 - 12.70 K/uL Final    RBC 10/26/2023 3.92 (L)  4.60 - 6.20 M/uL Final    Hemoglobin 10/26/2023 11.5 (L)  14.0 - 18.0 g/dL Final    Hematocrit  10/26/2023 36.6 (L)  40.0 - 54.0 % Final    MCV 10/26/2023 93  82 - 98 fL Final    MCH 10/26/2023 29.3  27.0 - 31.0 pg Final    MCHC 10/26/2023 31.4 (L)  32.0 - 36.0 g/dL Final    RDW 10/26/2023 13.8  11.5 - 14.5 % Final    Platelets 10/26/2023 232  150 - 450 K/uL Final    MPV 10/26/2023 10.1  9.2 - 12.9 fL Final    Immature Granulocytes 10/26/2023 0.4  0.0 - 0.5 % Final    Gran # (ANC) 10/26/2023 6.7  1.8 - 7.7 K/uL Final    Immature Grans (Abs) 10/26/2023 0.04  0.00 - 0.04 K/uL Final    Comment: Mild elevation in immature granulocytes is non specific and   can be seen in a variety of conditions including stress response,   acute inflammation, trauma and pregnancy. Correlation with other   laboratory and clinical findings is essential.      Lymph # 10/26/2023 1.5  1.0 - 4.8 K/uL Final    Mono # 10/26/2023 0.5  0.3 - 1.0 K/uL Final    Eos # 10/26/2023 0.1  0.0 - 0.5 K/uL Final    Baso # 10/26/2023 0.04  0.00 - 0.20 K/uL Final    nRBC 10/26/2023 0  0 /100 WBC Final    Gran % 10/26/2023 75.4 (H)  38.0 - 73.0 % Final    Lymph % 10/26/2023 17.3 (L)  18.0 - 48.0 % Final    Mono % 10/26/2023 5.5  4.0 - 15.0 % Final    Eosinophil % 10/26/2023 1.0  0.0 - 8.0 % Final    Basophil % 10/26/2023 0.4  0.0 - 1.9 % Final    Differential Method 10/26/2023 Automated   Final    Sodium 10/26/2023 143  136 - 145 mmol/L Final    Potassium 10/26/2023 3.9  3.5 - 5.1 mmol/L Final    Chloride 10/26/2023 104  95 - 110 mmol/L Final    CO2 10/26/2023 22 (L)  23 - 29 mmol/L Final    Glucose 10/26/2023 167 (H)  70 - 110 mg/dL Final    BUN 10/26/2023 40 (H)  8 - 23 mg/dL Final    Creatinine 10/26/2023 3.6 (H)  0.5 - 1.4 mg/dL Final    Calcium 10/26/2023 9.1  8.7 - 10.5 mg/dL Final    Total Protein 10/26/2023 7.5  6.0 - 8.4 g/dL Final    Albumin 10/26/2023 3.3 (L)  3.5 - 5.2 g/dL Final    Total Bilirubin 10/26/2023 0.5  0.1 - 1.0 mg/dL Final    Comment: For infants and newborns, interpretation of results should be based  on gestational age, weight  and in agreement with clinical  observations.    Premature Infant recommended reference ranges:  Up to 24 hours.............<8.0 mg/dL  Up to 48 hours............<12.0 mg/dL  3-5 days..................<15.0 mg/dL  6-29 days.................<15.0 mg/dL      Alkaline Phosphatase 10/26/2023 160 (H)  55 - 135 U/L Final    AST 10/26/2023 25  10 - 40 U/L Final    ALT 10/26/2023 24  10 - 44 U/L Final    eGFR 10/26/2023 17.0 (A)  >60 mL/min/1.73 m^2 Final    Anion Gap 10/26/2023 17 (H)  8 - 16 mmol/L Final    Hemoglobin A1C 10/26/2023 6.7 (H)  4.0 - 5.6 % Final    Comment: ADA Screening Guidelines:  5.7-6.4%  Consistent with prediabetes  >or=6.5%  Consistent with diabetes    High levels of fetal hemoglobin interfere with the HbA1C  assay. Heterozygous hemoglobin variants (HbS, HgC, etc)do  not significantly interfere with this assay.   However, presence of multiple variants may affect accuracy.      Estimated Avg Glucose 10/26/2023 146 (H)  68 - 131 mg/dL Final    TSH 10/26/2023 0.957  0.400 - 4.000 uIU/mL Final    Free T4 10/26/2023 0.94  0.71 - 1.51 ng/dL Final    Aldosterone 10/26/2023 34.8  ng/dL Final    Comment: REFERENCE RANGE:    Upright              <= 39.2 ng/dL    Supine               <= 23.2 ng/dL    Test performed at [facility],  [address], [place]  [address] [phone]  Kamille Cole MD, PhD - Medical Director      Testosterone, Total 10/26/2023 29 (L)  304 - 1227 ng/dL Final   Admission on 10/23/2023, Discharged on 10/23/2023   Component Date Value Ref Range Status    WBC 10/23/2023 8.13  3.90 - 12.70 K/uL Final    RBC 10/23/2023 3.89 (L)  4.60 - 6.20 M/uL Final    Hemoglobin 10/23/2023 11.5 (L)  14.0 - 18.0 g/dL Final    Hematocrit 10/23/2023 35.1 (L)  40.0 - 54.0 % Final    MCV 10/23/2023 90  82 - 98 fL Final    MCH 10/23/2023 29.6  27.0 - 31.0 pg Final    MCHC 10/23/2023 32.8  32.0 - 36.0 g/dL Final    RDW 10/23/2023 13.6  11.5 - 14.5 % Final    Platelets 10/23/2023 233   150 - 450 K/uL Final    MPV 10/23/2023 10.8  9.2 - 12.9 fL Final    Immature Granulocytes 10/23/2023 0.4  0.0 - 0.5 % Final    Gran # (ANC) 10/23/2023 6.0  1.8 - 7.7 K/uL Final    Immature Grans (Abs) 10/23/2023 0.03  0.00 - 0.04 K/uL Final    Comment: Mild elevation in immature granulocytes is non specific and   can be seen in a variety of conditions including stress response,   acute inflammation, trauma and pregnancy. Correlation with other   laboratory and clinical findings is essential.      Lymph # 10/23/2023 1.5  1.0 - 4.8 K/uL Final    Mono # 10/23/2023 0.5  0.3 - 1.0 K/uL Final    Eos # 10/23/2023 0.1  0.0 - 0.5 K/uL Final    Baso # 10/23/2023 0.04  0.00 - 0.20 K/uL Final    nRBC 10/23/2023 0  0 /100 WBC Final    Gran % 10/23/2023 73.7 (H)  38.0 - 73.0 % Final    Lymph % 10/23/2023 18.5  18.0 - 48.0 % Final    Mono % 10/23/2023 6.0  4.0 - 15.0 % Final    Eosinophil % 10/23/2023 0.9  0.0 - 8.0 % Final    Basophil % 10/23/2023 0.5  0.0 - 1.9 % Final    Differential Method 10/23/2023 Automated   Final    Sodium 10/23/2023 141  136 - 145 mmol/L Final    Potassium 10/23/2023 3.7  3.5 - 5.1 mmol/L Final    Chloride 10/23/2023 107  95 - 110 mmol/L Final    CO2 10/23/2023 17 (L)  23 - 29 mmol/L Final    Glucose 10/23/2023 125 (H)  70 - 110 mg/dL Final    BUN 10/23/2023 37 (H)  8 - 23 mg/dL Final    Creatinine 10/23/2023 3.1 (H)  0.5 - 1.4 mg/dL Final    Calcium 10/23/2023 9.1  8.7 - 10.5 mg/dL Final    Total Protein 10/23/2023 7.0  6.0 - 8.4 g/dL Final    Albumin 10/23/2023 3.0 (L)  3.5 - 5.2 g/dL Final    Total Bilirubin 10/23/2023 0.6  0.1 - 1.0 mg/dL Final    Comment: For infants and newborns, interpretation of results should be based  on gestational age, weight and in agreement with clinical  observations.    Premature Infant recommended reference ranges:  Up to 24 hours.............<8.0 mg/dL  Up to 48 hours............<12.0 mg/dL  3-5 days..................<15.0 mg/dL  6-29 days.................<15.0 mg/dL       Alkaline Phosphatase 10/23/2023 161 (H)  55 - 135 U/L Final    AST 10/23/2023 35  10 - 40 U/L Final    ALT 10/23/2023 34  10 - 44 U/L Final    eGFR 10/23/2023 20.3 (A)  >60 mL/min/1.73 m^2 Final    Anion Gap 10/23/2023 17 (H)  8 - 16 mmol/L Final    Troponin I 10/23/2023 0.033 (H)  0.000 - 0.026 ng/mL Final    Comment: The reference interval for Troponin I represents the 99th percentile   cutoff   for our facility and is consistent with 3rd generation assay   performance.      BNP 10/23/2023 156 (H)  0 - 99 pg/mL Final    Values of less than 100 pg/ml are consistent with non-CHF populations.    Magnesium 10/23/2023 1.3 (L)  1.6 - 2.6 mg/dL Final    Specimen UA 10/23/2023 Urine, Unspecified   Final    Color, UA 10/23/2023 Colorless (A)  Yellow, Straw, Stacy Final    Appearance, UA 10/23/2023 Clear  Clear Final    pH, UA 10/23/2023 6.0  5.0 - 8.0 Final    Specific Gravity, UA 10/23/2023 <1.005 (A)  1.005 - 1.030 Final    Protein, UA 10/23/2023 Negative  Negative Final    Comment: Recommend a 24 hour urine protein or a urine   protein/creatinine ratio if globulin induced proteinuria is  clinically suspected.      Glucose, UA 10/23/2023 Negative  Negative Final    Ketones, UA 10/23/2023 Negative  Negative Final    Bilirubin (UA) 10/23/2023 Negative  Negative Final    Occult Blood UA 10/23/2023 Negative  Negative Final    Nitrite, UA 10/23/2023 Negative  Negative Final    Urobilinogen, UA 10/23/2023 Negative  Negative EU/dL Final    Leukocytes, UA 10/23/2023 Negative  Negative Final    Troponin I 10/23/2023 0.019  0.000 - 0.026 ng/mL Final    Comment: The reference interval for Troponin I represents the 99th percentile   cutoff   for our facility and is consistent with 3rd generation assay   performance.     Lab Visit on 10/13/2023   Component Date Value Ref Range Status    Protein, Urine Random 10/13/2023 <7  0 - 15 mg/dL Final    Creatinine, Urine 10/13/2023 32.0  23.0 - 375.0 mg/dL Final    Prot/Creat Ratio, Urine  10/13/2023 Unable to calculate  0.00 - 0.20 Final    Microalbumin, Urine 10/13/2023 14.0  ug/mL Final    Creatinine, Urine 10/13/2023 32.0  23.0 - 375.0 mg/dL Final    Microalb/Creat Ratio 10/13/2023 43.8 (H)  0.0 - 30.0 ug/mg Final   Admission on 10/10/2023, Discharged on 10/11/2023   Component Date Value Ref Range Status    WBC 10/10/2023 10.98  3.90 - 12.70 K/uL Final    RBC 10/10/2023 4.46 (L)  4.60 - 6.20 M/uL Final    Hemoglobin 10/10/2023 13.0 (L)  14.0 - 18.0 g/dL Final    Hematocrit 10/10/2023 40.7  40.0 - 54.0 % Final    MCV 10/10/2023 91  82 - 98 fL Final    MCH 10/10/2023 29.1  27.0 - 31.0 pg Final    MCHC 10/10/2023 31.9 (L)  32.0 - 36.0 g/dL Final    RDW 10/10/2023 14.2  11.5 - 14.5 % Final    Platelets 10/10/2023 230  150 - 450 K/uL Final    MPV 10/10/2023 10.2  9.2 - 12.9 fL Final    Immature Granulocytes 10/10/2023 0.4  0.0 - 0.5 % Final    Gran # (ANC) 10/10/2023 6.9  1.8 - 7.7 K/uL Final    Immature Grans (Abs) 10/10/2023 0.04  0.00 - 0.04 K/uL Final    Comment: Mild elevation in immature granulocytes is non specific and   can be seen in a variety of conditions including stress response,   acute inflammation, trauma and pregnancy. Correlation with other   laboratory and clinical findings is essential.      Lymph # 10/10/2023 3.2  1.0 - 4.8 K/uL Final    Mono # 10/10/2023 0.7  0.3 - 1.0 K/uL Final    Eos # 10/10/2023 0.1  0.0 - 0.5 K/uL Final    Baso # 10/10/2023 0.04  0.00 - 0.20 K/uL Final    nRBC 10/10/2023 0  0 /100 WBC Final    Gran % 10/10/2023 62.9  38.0 - 73.0 % Final    Lymph % 10/10/2023 29.1  18.0 - 48.0 % Final    Mono % 10/10/2023 6.5  4.0 - 15.0 % Final    Eosinophil % 10/10/2023 0.7  0.0 - 8.0 % Final    Basophil % 10/10/2023 0.4  0.0 - 1.9 % Final    Differential Method 10/10/2023 Automated   Final    Sodium 10/10/2023 141  136 - 145 mmol/L Final    Potassium 10/10/2023 3.5  3.5 - 5.1 mmol/L Final    Chloride 10/10/2023 104  95 - 110 mmol/L Final    CO2 10/10/2023 20 (L)  23 - 29  mmol/L Final    Glucose 10/10/2023 71  70 - 110 mg/dL Final    BUN 10/10/2023 35 (H)  8 - 23 mg/dL Final    Creatinine 10/10/2023 3.1 (H)  0.5 - 1.4 mg/dL Final    Calcium 10/10/2023 9.7  8.7 - 10.5 mg/dL Final    Total Protein 10/10/2023 8.0  6.0 - 8.4 g/dL Final    Albumin 10/10/2023 3.8  3.5 - 5.2 g/dL Final    Total Bilirubin 10/10/2023 0.6  0.1 - 1.0 mg/dL Final    Comment: For infants and newborns, interpretation of results should be based  on gestational age, weight and in agreement with clinical  observations.    Premature Infant recommended reference ranges:  Up to 24 hours.............<8.0 mg/dL  Up to 48 hours............<12.0 mg/dL  3-5 days..................<15.0 mg/dL  6-29 days.................<15.0 mg/dL      Alkaline Phosphatase 10/10/2023 158 (H)  55 - 135 U/L Final    AST 10/10/2023 35  10 - 40 U/L Final    ALT 10/10/2023 31  10 - 44 U/L Final    eGFR 10/10/2023 20.3 (A)  >60 mL/min/1.73 m^2 Final    Anion Gap 10/10/2023 17 (H)  8 - 16 mmol/L Final    Troponin I 10/10/2023 0.015  0.000 - 0.026 ng/mL Final    Comment: The reference interval for Troponin I represents the 99th percentile   cutoff   for our facility and is consistent with 3rd generation assay   performance.      BNP 10/10/2023 336 (H)  0 - 99 pg/mL Final    Values of less than 100 pg/ml are consistent with non-CHF populations.    TSH 10/10/2023 1.739  0.400 - 4.000 uIU/mL Final    POCT Glucose 10/10/2023 72  70 - 110 mg/dL Final   Admission on 09/27/2023, Discharged on 09/28/2023   Component Date Value Ref Range Status    WBC 09/27/2023 7.18  3.90 - 12.70 K/uL Final    RBC 09/27/2023 4.17 (L)  4.60 - 6.20 M/uL Final    Hemoglobin 09/27/2023 12.1 (L)  14.0 - 18.0 g/dL Final    Hematocrit 09/27/2023 38.0 (L)  40.0 - 54.0 % Final    MCV 09/27/2023 91  82 - 98 fL Final    MCH 09/27/2023 29.0  27.0 - 31.0 pg Final    MCHC 09/27/2023 31.8 (L)  32.0 - 36.0 g/dL Final    RDW 09/27/2023 14.3  11.5 - 14.5 % Final    Platelets 09/27/2023 234   150 - 450 K/uL Final    MPV 09/27/2023 10.6  9.2 - 12.9 fL Final    Immature Granulocytes 09/27/2023 0.3  0.0 - 0.5 % Final    Gran # (ANC) 09/27/2023 5.0  1.8 - 7.7 K/uL Final    Immature Grans (Abs) 09/27/2023 0.02  0.00 - 0.04 K/uL Final    Comment: Mild elevation in immature granulocytes is non specific and   can be seen in a variety of conditions including stress response,   acute inflammation, trauma and pregnancy. Correlation with other   laboratory and clinical findings is essential.      Lymph # 09/27/2023 1.8  1.0 - 4.8 K/uL Final    Mono # 09/27/2023 0.3  0.3 - 1.0 K/uL Final    Eos # 09/27/2023 0.1  0.0 - 0.5 K/uL Final    Baso # 09/27/2023 0.03  0.00 - 0.20 K/uL Final    nRBC 09/27/2023 0  0 /100 WBC Final    Gran % 09/27/2023 69.5  38.0 - 73.0 % Final    Lymph % 09/27/2023 24.4  18.0 - 48.0 % Final    Mono % 09/27/2023 4.3  4.0 - 15.0 % Final    Eosinophil % 09/27/2023 1.1  0.0 - 8.0 % Final    Basophil % 09/27/2023 0.4  0.0 - 1.9 % Final    Differential Method 09/27/2023 Automated   Final    Sodium 09/27/2023 140  136 - 145 mmol/L Final    Potassium 09/27/2023 3.4 (L)  3.5 - 5.1 mmol/L Final    Chloride 09/27/2023 103  95 - 110 mmol/L Final    CO2 09/27/2023 21 (L)  23 - 29 mmol/L Final    Glucose 09/27/2023 110  70 - 110 mg/dL Final    BUN 09/27/2023 49 (H)  8 - 23 mg/dL Final    Creatinine 09/27/2023 2.8 (H)  0.5 - 1.4 mg/dL Final    Calcium 09/27/2023 8.5 (L)  8.7 - 10.5 mg/dL Final    Total Protein 09/27/2023 7.3  6.0 - 8.4 g/dL Final    Albumin 09/27/2023 3.5  3.5 - 5.2 g/dL Final    Total Bilirubin 09/27/2023 0.6  0.1 - 1.0 mg/dL Final    Comment: For infants and newborns, interpretation of results should be based  on gestational age, weight and in agreement with clinical  observations.    Premature Infant recommended reference ranges:  Up to 24 hours.............<8.0 mg/dL  Up to 48 hours............<12.0 mg/dL  3-5 days..................<15.0 mg/dL  6-29 days.................<15.0 mg/dL       Alkaline Phosphatase 09/27/2023 146 (H)  55 - 135 U/L Final    AST 09/27/2023 50 (H)  10 - 40 U/L Final    ALT 09/27/2023 41  10 - 44 U/L Final    eGFR 09/27/2023 23.0 (A)  >60 mL/min/1.73 m^2 Final    Anion Gap 09/27/2023 16  8 - 16 mmol/L Final    Troponin I 09/27/2023 0.047 (H)  0.000 - 0.026 ng/mL Final    Comment: The reference interval for Troponin I represents the 99th percentile   cutoff   for our facility and is consistent with 3rd generation assay   performance.      BNP 09/27/2023 382 (H)  0 - 99 pg/mL Final    Values of less than 100 pg/ml are consistent with non-CHF populations.    Specimen UA 09/28/2023 Urine, Clean Catch   Final    Color, UA 09/28/2023 Yellow  Yellow, Straw, Stacy Final    Appearance, UA 09/28/2023 Clear  Clear Final    pH, UA 09/28/2023 5.0  5.0 - 8.0 Final    Specific Gravity, UA 09/28/2023 1.010  1.005 - 1.030 Final    Protein, UA 09/28/2023 Trace (A)  Negative Final    Comment: Recommend a 24 hour urine protein or a urine   protein/creatinine ratio if globulin induced proteinuria is  clinically suspected.      Glucose, UA 09/28/2023 2+ (A)  Negative Final    Ketones, UA 09/28/2023 Negative  Negative Final    Bilirubin (UA) 09/28/2023 Negative  Negative Final    Occult Blood UA 09/28/2023 Negative  Negative Final    Nitrite, UA 09/28/2023 Negative  Negative Final    Urobilinogen, UA 09/28/2023 Negative  Negative EU/dL Final    Leukocytes, UA 09/28/2023 Negative  Negative Final    Prothrombin Time 09/27/2023 11.4  9.0 - 12.5 sec Final    INR 09/27/2023 1.1  0.8 - 1.2 Final    Comment: Coumadin Therapy:  2.0 - 3.0 for INR for all indicators except mechanical heart valves  and antiphospholipid syndromes which should use 2.5 - 3.5.  LOT^040^PT Inn^[id number]      aPTT 09/27/2023 24.8  21.0 - 32.0 sec Final    Comment: Refer to local heparin nomogram for intensity/dose specific   therapeutic   range.  LOT^050^APTT FSL^[id number]      BSA 09/28/2023 2.25  m2 Final    LVIDd 09/28/2023 4.49  3.5 -  6.0 cm Final    LV Systolic Volume 09/28/2023 28.26  mL Final    LV Systolic Volume Index 09/28/2023 12.6  mL/m2 Final    LVIDs 09/28/2023 2.75  2.1 - 4.0 cm Final    LV Diastolic Volume 09/28/2023 91.75  mL Final    LV Diastolic Volume Index 09/28/2023 40.96  mL/m2 Final    IVS 09/28/2023 1.20 (A)  0.6 - 1.1 cm Final    LVOT diameter 09/28/2023 2.17  cm Final    LVOT area 09/28/2023 3.7  cm2 Final    FS 09/28/2023 39  28 - 44 % Final    Left Ventricle Relative Wall Thick* 09/28/2023 0.59  cm Final    Posterior Wall 09/28/2023 1.33 (A)  0.6 - 1.1 cm Final    LV mass 09/28/2023 213.12  g Final    LV Mass Index 09/28/2023 95  g/m2 Final    MV Peak E Joao 09/28/2023 0.54  m/s Final    TDI LATERAL 09/28/2023 0.04  m/s Final    TDI SEPTAL 09/28/2023 0.07  m/s Final    E/E' ratio 09/28/2023 9.82  m/s Final    MV Peak A Joao 09/28/2023 0.91  m/s Final    E/A ratio 09/28/2023 0.59   Final    E wave deceleration time 09/28/2023 144.49  msec Final    LV SEPTAL E/E' RATIO 09/28/2023 7.71  m/s Final    LV LATERAL E/E' RATIO 09/28/2023 13.50  m/s Final    LVOT peak jaoo 09/28/2023 1.08  m/s Final    LA size 09/28/2023 3.15  cm Final    RVDD 09/28/2023 3.04  cm Final    AV peak gradient 09/28/2023 5  mmHg Final    Ao peak joao 09/28/2023 1.12  m/s Final    AV Velocity Ratio 09/28/2023 0.96   Final    KJ by Velocity Ratio 09/28/2023 3.56  cm² Final    MV stenosis pressure 1/2 time 09/28/2023 41.90  ms Final    MV valve area p 1/2 method 09/28/2023 5.25  cm2 Final    PV PEAK VELOCITY 09/28/2023 1.33  m/s Final    PV peak gradient 09/28/2023 7  mmHg Final    Ao root annulus 09/28/2023 4.14  cm Final    IVC diameter 09/28/2023 1.55  cm Final    Mean e' 09/28/2023 0.06  m/s Final    ZLVIDS 09/28/2023 -4.49   Final    ZLVIDD 09/28/2023 -5.79   Final    AORTIC VALVE CUSP SEPERATION 09/28/2023 2.62  cm Final    Est. RA pres 09/28/2023 3  mmHg Final    EF 09/28/2023 60  % Final    Cholesterol 09/27/2023 161  120 - 199 mg/dL Final    Comment:  The National Cholesterol Education Program (NCEP) has set the  following guidelines (reference ranges) for Cholesterol:  Optimal.....................<200 mg/dL  Borderline High.............200-239 mg/dL  High........................> or = 240 mg/dL      Triglycerides 09/27/2023 164 (H)  30 - 150 mg/dL Final    Comment: The National Cholesterol Education Program (NCEP) has set the  following guidelines (reference values) for triglycerides:  Normal......................<150 mg/dL  Borderline High.............150-199 mg/dL  High........................200-499 mg/dL      HDL 09/27/2023 36 (L)  40 - 75 mg/dL Final    Comment: The National Cholesterol Education Program (NCEP) has set the  following guidelines (reference values) for HDL Cholesterol:  Low...............<40 mg/dL  Optimal...........>60 mg/dL      LDL Cholesterol 09/27/2023 92.2  63.0 - 159.0 mg/dL Final    Comment: The National Cholesterol Education Program (NCEP) has set the  following guidelines (reference values) for LDL Cholesterol:  Optimal.......................<130 mg/dL  Borderline High...............130-159 mg/dL  High..........................160-189 mg/dL  Very High.....................>190 mg/dL      HDL/Cholesterol Ratio 09/27/2023 22.4  20.0 - 50.0 % Final    Total Cholesterol/HDL Ratio 09/27/2023 4.5  2.0 - 5.0 Final    Non-HDL Cholesterol 09/27/2023 125  mg/dL Final    Comment: Risk category and Non-HDL cholesterol goals:  Coronary heart disease (CHD)or equivalent (10-year risk of CHD >20%):  Non-HDL cholesterol goal     <130 mg/dL  Two or more CHD risk factors and 10-year risk of CHD <= 20%:  Non-HDL cholesterol goal     <160 mg/dL  0 to 1 CHD risk factor:  Non-HDL cholesterol goal     <190 mg/dL      TSH 09/27/2023 1.720  0.400 - 4.000 uIU/mL Final    Magnesium 09/27/2023 1.2 (L)  1.6 - 2.6 mg/dL Final    Hemoglobin A1C 09/27/2023 6.8 (H)  4.0 - 5.6 % Final    Comment: ADA Screening Guidelines:  5.7-6.4%  Consistent with  prediabetes  >or=6.5%  Consistent with diabetes    High levels of fetal hemoglobin interfere with the HbA1C  assay. Heterozygous hemoglobin variants (HbS, HgC, etc)do  not significantly interfere with this assay.   However, presence of multiple variants may affect accuracy.      Estimated Avg Glucose 09/27/2023 148 (H)  68 - 131 mg/dL Final    POCT Glucose 09/27/2023 56 (L)  70 - 110 mg/dL Final    POCT Glucose 09/27/2023 98  70 - 110 mg/dL Final    Troponin I 09/28/2023 0.042 (H)  0.000 - 0.026 ng/mL Final    Comment: The reference interval for Troponin I represents the 99th percentile   cutoff   for our facility and is consistent with 3rd generation assay   performance.      POCT Glucose 09/28/2023 144 (H)  70 - 110 mg/dL Final    WBC 09/28/2023 9.33  3.90 - 12.70 K/uL Final    RBC 09/28/2023 4.22 (L)  4.60 - 6.20 M/uL Final    Hemoglobin 09/28/2023 12.2 (L)  14.0 - 18.0 g/dL Final    Hematocrit 09/28/2023 39.0 (L)  40.0 - 54.0 % Final    MCV 09/28/2023 92  82 - 98 fL Final    MCH 09/28/2023 28.9  27.0 - 31.0 pg Final    MCHC 09/28/2023 31.3 (L)  32.0 - 36.0 g/dL Final    RDW 09/28/2023 14.4  11.5 - 14.5 % Final    Platelets 09/28/2023 226  150 - 450 K/uL Final    MPV 09/28/2023 11.2  9.2 - 12.9 fL Final    Immature Granulocytes 09/28/2023 0.2  0.0 - 0.5 % Final    Gran # (ANC) 09/28/2023 6.7  1.8 - 7.7 K/uL Final    Immature Grans (Abs) 09/28/2023 0.02  0.00 - 0.04 K/uL Final    Comment: Mild elevation in immature granulocytes is non specific and   can be seen in a variety of conditions including stress response,   acute inflammation, trauma and pregnancy. Correlation with other   laboratory and clinical findings is essential.      Lymph # 09/28/2023 2.0  1.0 - 4.8 K/uL Final    Mono # 09/28/2023 0.5  0.3 - 1.0 K/uL Final    Eos # 09/28/2023 0.1  0.0 - 0.5 K/uL Final    Baso # 09/28/2023 0.03  0.00 - 0.20 K/uL Final    nRBC 09/28/2023 0  0 /100 WBC Final    Gran % 09/28/2023 71.8  38.0 - 73.0 % Final    Lymph %  09/28/2023 21.8  18.0 - 48.0 % Final    Mono % 09/28/2023 5.0  4.0 - 15.0 % Final    Eosinophil % 09/28/2023 0.9  0.0 - 8.0 % Final    Basophil % 09/28/2023 0.3  0.0 - 1.9 % Final    Differential Method 09/28/2023 Automated   Final    Phosphorus 09/28/2023 3.9  2.7 - 4.5 mg/dL Final    Troponin I 09/28/2023 0.039 (H)  0.000 - 0.026 ng/mL Final    Comment: The reference interval for Troponin I represents the 99th percentile   cutoff   for our facility and is consistent with 3rd generation assay   performance.      Magnesium 09/28/2023 3.0 (H)  1.6 - 2.6 mg/dL Final    Magnesium 09/28/2023 2.3  1.6 - 2.6 mg/dL Final    Sodium 09/28/2023 141  136 - 145 mmol/L Final    Potassium 09/28/2023 3.5  3.5 - 5.1 mmol/L Final    Chloride 09/28/2023 107  95 - 110 mmol/L Final    CO2 09/28/2023 19 (L)  23 - 29 mmol/L Final    Glucose 09/28/2023 113 (H)  70 - 110 mg/dL Final    BUN 09/28/2023 44 (H)  8 - 23 mg/dL Final    Creatinine 09/28/2023 2.5 (H)  0.5 - 1.4 mg/dL Final    Calcium 09/28/2023 8.7  8.7 - 10.5 mg/dL Final    Total Protein 09/28/2023 7.0  6.0 - 8.4 g/dL Final    Albumin 09/28/2023 3.3 (L)  3.5 - 5.2 g/dL Final    Total Bilirubin 09/28/2023 0.4  0.1 - 1.0 mg/dL Final    Comment: For infants and newborns, interpretation of results should be based  on gestational age, weight and in agreement with clinical  observations.    Premature Infant recommended reference ranges:  Up to 24 hours.............<8.0 mg/dL  Up to 48 hours............<12.0 mg/dL  3-5 days..................<15.0 mg/dL  6-29 days.................<15.0 mg/dL      Alkaline Phosphatase 09/28/2023 136 (H)  55 - 135 U/L Final    AST 09/28/2023 39  10 - 40 U/L Final    ALT 09/28/2023 37  10 - 44 U/L Final    eGFR 09/28/2023 26.3 (A)  >60 mL/min/1.73 m^2 Final    Anion Gap 09/28/2023 15  8 - 16 mmol/L Final    POCT Glucose 09/28/2023 152 (H)  70 - 110 mg/dL Final    POCT Glucose 09/28/2023 160 (H)  70 - 110 mg/dL Final   There may be more visits with results  that are not included.       EKG  EKG 12/2023 normal sinus rhythm left axis deviation right bundle-branch block inferior infarct versus LAFB nonspecific ST-T changes abnormal precordial R-wave progression low anterior [facility]    Echo   Results for orders placed or performed during the hospital encounter of 09/27/23   Echo   Result Value Ref Range    BSA 2.25 m2    LVIDd 4.49 3.5 - 6.0 cm    LV Systolic Volume 28.26 mL    LV Systolic Volume Index 12.6 mL/m2    LVIDs 2.75 2.1 - 4.0 cm    LV Diastolic Volume 91.75 mL    LV Diastolic Volume Index 40.96 mL/m2    IVS 1.20 (A) 0.6 - 1.1 cm    LVOT diameter 2.17 cm    LVOT area 3.7 cm2    FS 39 28 - 44 %    Left Ventricle Relative Wall Thickness 0.59 cm    Posterior Wall 1.33 (A) 0.6 - 1.1 cm    LV mass 213.12 g    LV Mass Index 95 g/m2    MV Peak E Joao 0.54 m/s    TDI LATERAL 0.04 m/s    TDI SEPTAL 0.07 m/s    E/E' ratio 9.82 m/s    MV Peak A Joao 0.91 m/s    E/A ratio 0.59     E wave deceleration time 144.49 msec    LV SEPTAL E/E' RATIO 7.71 m/s    LV LATERAL E/E' RATIO 13.50 m/s    LVOT peak joao 1.08 m/s    LA size 3.15 cm    RVDD 3.04 cm    AV peak gradient 5 mmHg    Ao peak joao 1.12 m/s    AV Velocity Ratio 0.96     KJ by Velocity Ratio 3.56 cm²    MV stenosis pressure 1/2 time 41.90 ms    MV valve area p 1/2 method 5.25 cm2    PV PEAK VELOCITY 1.33 m/s    PV peak gradient 7 mmHg    Ao root annulus 4.14 cm    IVC diameter 1.55 cm    Mean e' 0.06 m/s    ZLVIDS -4.49     ZLVIDD -5.79     AORTIC VALVE CUSP SEPERATION 2.62 cm    Est. RA pres 3 mmHg    EF 60 %    Narrative      Left Ventricle: The left ventricle is normal in size. Mildly increased   wall thickness. There is mild concentric hypertrophy. Normal wall motion.   There is normal systolic function. Ejection fraction by visual   approximation is 60%. Grade I diastolic dysfunction.    Right Ventricle: Normal right ventricular cavity size. Wall thickness   is normal. Right ventricle wall motion  is normal. Systolic  function is   normal.         Imaging  No results found.    Prior coronary angiogram / intervention:  See HPI    Assessment and Plan  1. Syncope, orthostatic / autonomic dysfunction  See regimen in HPI  Follow up with Neurology, Nephrology, Endocrinology, Gastroenterology, PCP  Working on prevention of watery stool  Apparently neurology has sent a trial of mestinon  The patient is getting scheduled IVF infusions, appreciate nephrology assistance  Was on Toprol for VT/NSVT in additional aneurysmal disease can no longer tolerate  Taking midodrine PRN 10 t.i.d. and fludrocortisone daily  Continue current medication regimen for orthostatic hypotension/autonomic dysfunction/hypertension    2. Orthostatic hypotension /autonomic dysfunction  As above    3. Essential hypertension  As above  Patient has labile hypertension with frequent hypotension very complex as in HPI  Continue hydralazine p.r.n. SBP greater than 180  Toprol would be ideal if tolerated but currently holding     4. Coronary artery disease involving native coronary artery of native heart without angina pectoris  Continue aspirin 81 and Pravachol 40  Evidently there are allergies to Crestor and Lipitor.  These allergies are unknown.    5. Prolonged QT  Avoid QT prolonging agents     6. Hyperlipidemia, unspecified hyperlipidemia type  Pravastatin as above    7. VT (ventricular tachycardia) +/- pAF?  The patient has had implanted LINQ device  Beta-blocker would be ideal if tolerated  Do not have enough evidence of/for PAF per electrophysiology  See HPI  Follow up with electrophysiology as he is now established    8. Ascending aortic aneurysm, unspecified whether ruptured  Control hypertension   Beta-blocker would be ideal if tolerated  Serial monitoring difficult with advanced CKD  Followed by Cardiothoracic surgery    9. Carotid stenosis  Not hemodynamically significant  Continue aspirin statin    Total professional time spent for the encounter: 40  minutes  Time was spent preparing to see the patient, reviewing results of prior testing, obtaining and/or reviewing separately obtained history, performing a medically appropriate examination and interview, counseling and educating the patient/family, ordering medications/tests/procedures, referring and communicating with other health care professionals, documenting clinical information in the electronic health record, and independently interpreting results.     Follow Up  1 month      Aj Marrufo MD, FACC, RPVI  Interventional Cardiology

## 2023-12-29 ENCOUNTER — INFUSION (OUTPATIENT)
Dept: INFECTIOUS DISEASES | Facility: HOSPITAL | Age: 73
End: 2023-12-29
Attending: INTERNAL MEDICINE
Payer: MEDICARE

## 2023-12-29 VITALS
HEIGHT: 73 IN | DIASTOLIC BLOOD PRESSURE: 97 MMHG | OXYGEN SATURATION: 98 % | TEMPERATURE: 98 F | WEIGHT: 209.13 LBS | RESPIRATION RATE: 16 BRPM | BODY MASS INDEX: 27.72 KG/M2 | HEART RATE: 99 BPM | SYSTOLIC BLOOD PRESSURE: 175 MMHG

## 2023-12-29 DIAGNOSIS — N18.32 TYPE 2 DIABETES MELLITUS WITH STAGE 3B CHRONIC KIDNEY DISEASE, WITHOUT LONG-TERM CURRENT USE OF INSULIN: Primary | ICD-10-CM

## 2023-12-29 DIAGNOSIS — E11.22 TYPE 2 DIABETES MELLITUS WITH STAGE 3B CHRONIC KIDNEY DISEASE, WITHOUT LONG-TERM CURRENT USE OF INSULIN: Primary | ICD-10-CM

## 2023-12-29 DIAGNOSIS — E86.0 DEHYDRATION: ICD-10-CM

## 2023-12-29 DIAGNOSIS — Z93.3 COLOSTOMY PRESENT: ICD-10-CM

## 2023-12-29 PROCEDURE — 25000003 PHARM REV CODE 250: Mod: HCNC | Performed by: NURSE PRACTITIONER

## 2023-12-29 PROCEDURE — 96365 THER/PROPH/DIAG IV INF INIT: CPT | Mod: HCNC

## 2023-12-29 RX ORDER — SODIUM CHLORIDE 0.9 % (FLUSH) 0.9 %
10 SYRINGE (ML) INJECTION
Status: CANCELLED | OUTPATIENT
Start: 2024-01-02

## 2023-12-29 RX ORDER — HEPARIN 100 UNIT/ML
500 SYRINGE INTRAVENOUS
Status: CANCELLED | OUTPATIENT
Start: 2024-01-02

## 2023-12-29 RX ADMIN — SODIUM CHLORIDE 1000 ML: 9 INJECTION, SOLUTION INTRAVENOUS at 08:12

## 2024-01-02 ENCOUNTER — TELEPHONE (OUTPATIENT)
Dept: PRIMARY CARE CLINIC | Facility: CLINIC | Age: 74
End: 2024-01-02

## 2024-01-02 ENCOUNTER — INFUSION (OUTPATIENT)
Dept: INFECTIOUS DISEASES | Facility: HOSPITAL | Age: 74
End: 2024-01-02
Payer: MEDICARE

## 2024-01-02 VITALS
RESPIRATION RATE: 16 BRPM | HEIGHT: 74 IN | SYSTOLIC BLOOD PRESSURE: 184 MMHG | DIASTOLIC BLOOD PRESSURE: 93 MMHG | BODY MASS INDEX: 27.25 KG/M2 | OXYGEN SATURATION: 100 % | HEART RATE: 87 BPM | TEMPERATURE: 98 F | WEIGHT: 212.31 LBS

## 2024-01-02 DIAGNOSIS — E86.0 DEHYDRATION: ICD-10-CM

## 2024-01-02 DIAGNOSIS — Z93.3 COLOSTOMY PRESENT: ICD-10-CM

## 2024-01-02 DIAGNOSIS — N18.32 TYPE 2 DIABETES MELLITUS WITH STAGE 3B CHRONIC KIDNEY DISEASE, WITHOUT LONG-TERM CURRENT USE OF INSULIN: Primary | ICD-10-CM

## 2024-01-02 DIAGNOSIS — E11.22 TYPE 2 DIABETES MELLITUS WITH STAGE 3B CHRONIC KIDNEY DISEASE, WITHOUT LONG-TERM CURRENT USE OF INSULIN: Primary | ICD-10-CM

## 2024-01-02 PROCEDURE — 25000003 PHARM REV CODE 250: Mod: HCNC | Performed by: NURSE PRACTITIONER

## 2024-01-02 PROCEDURE — 96360 HYDRATION IV INFUSION INIT: CPT | Mod: HCNC

## 2024-01-02 RX ORDER — HEPARIN 100 UNIT/ML
500 SYRINGE INTRAVENOUS
Status: DISCONTINUED | OUTPATIENT
Start: 2024-01-02 | End: 2024-01-02 | Stop reason: HOSPADM

## 2024-01-02 RX ORDER — SODIUM CHLORIDE 0.9 % (FLUSH) 0.9 %
10 SYRINGE (ML) INJECTION
Status: DISCONTINUED | OUTPATIENT
Start: 2024-01-02 | End: 2024-01-02 | Stop reason: HOSPADM

## 2024-01-02 RX ORDER — HEPARIN 100 UNIT/ML
500 SYRINGE INTRAVENOUS
Status: CANCELLED | OUTPATIENT
Start: 2024-01-05

## 2024-01-02 RX ORDER — SODIUM CHLORIDE 0.9 % (FLUSH) 0.9 %
10 SYRINGE (ML) INJECTION
Status: CANCELLED | OUTPATIENT
Start: 2024-01-05

## 2024-01-02 RX ADMIN — SODIUM CHLORIDE 1000 ML: 9 INJECTION, SOLUTION INTRAVENOUS at 08:01

## 2024-01-04 ENCOUNTER — OFFICE VISIT (OUTPATIENT)
Dept: PRIMARY CARE CLINIC | Facility: CLINIC | Age: 74
End: 2024-01-04
Payer: MEDICARE

## 2024-01-04 VITALS
RESPIRATION RATE: 18 BRPM | DIASTOLIC BLOOD PRESSURE: 94 MMHG | BODY MASS INDEX: 27.19 KG/M2 | HEART RATE: 99 BPM | OXYGEN SATURATION: 99 % | SYSTOLIC BLOOD PRESSURE: 156 MMHG | WEIGHT: 211.88 LBS | HEIGHT: 74 IN

## 2024-01-04 DIAGNOSIS — R09.89 LABILE HYPERTENSION: ICD-10-CM

## 2024-01-04 DIAGNOSIS — N18.32 STAGE 3B CHRONIC KIDNEY DISEASE: ICD-10-CM

## 2024-01-04 DIAGNOSIS — E11.22 TYPE 2 DIABETES MELLITUS WITH STAGE 3B CHRONIC KIDNEY DISEASE, WITHOUT LONG-TERM CURRENT USE OF INSULIN: ICD-10-CM

## 2024-01-04 DIAGNOSIS — J06.9 URI WITH COUGH AND CONGESTION: Primary | ICD-10-CM

## 2024-01-04 DIAGNOSIS — N18.32 TYPE 2 DIABETES MELLITUS WITH STAGE 3B CHRONIC KIDNEY DISEASE, WITHOUT LONG-TERM CURRENT USE OF INSULIN: ICD-10-CM

## 2024-01-04 DIAGNOSIS — I10 ESSENTIAL HYPERTENSION: ICD-10-CM

## 2024-01-04 PROCEDURE — 3288F FALL RISK ASSESSMENT DOCD: CPT | Mod: HCNC,CPTII,S$GLB, | Performed by: INTERNAL MEDICINE

## 2024-01-04 PROCEDURE — 3044F HG A1C LEVEL LT 7.0%: CPT | Mod: HCNC,CPTII,S$GLB, | Performed by: INTERNAL MEDICINE

## 2024-01-04 PROCEDURE — 1160F RVW MEDS BY RX/DR IN RCRD: CPT | Mod: HCNC,CPTII,S$GLB, | Performed by: INTERNAL MEDICINE

## 2024-01-04 PROCEDURE — 3080F DIAST BP >= 90 MM HG: CPT | Mod: HCNC,CPTII,S$GLB, | Performed by: INTERNAL MEDICINE

## 2024-01-04 PROCEDURE — 1159F MED LIST DOCD IN RCRD: CPT | Mod: HCNC,CPTII,S$GLB, | Performed by: INTERNAL MEDICINE

## 2024-01-04 PROCEDURE — 1101F PT FALLS ASSESS-DOCD LE1/YR: CPT | Mod: HCNC,CPTII,S$GLB, | Performed by: INTERNAL MEDICINE

## 2024-01-04 PROCEDURE — 3077F SYST BP >= 140 MM HG: CPT | Mod: HCNC,CPTII,S$GLB, | Performed by: INTERNAL MEDICINE

## 2024-01-04 PROCEDURE — 99214 OFFICE O/P EST MOD 30 MIN: CPT | Mod: HCNC,S$GLB,, | Performed by: INTERNAL MEDICINE

## 2024-01-04 PROCEDURE — 1126F AMNT PAIN NOTED NONE PRSNT: CPT | Mod: HCNC,CPTII,S$GLB, | Performed by: INTERNAL MEDICINE

## 2024-01-04 PROCEDURE — 3008F BODY MASS INDEX DOCD: CPT | Mod: HCNC,CPTII,S$GLB, | Performed by: INTERNAL MEDICINE

## 2024-01-04 PROCEDURE — 99999 PR PBB SHADOW E&M-EST. PATIENT-LVL V: CPT | Mod: PBBFAC,HCNC,, | Performed by: INTERNAL MEDICINE

## 2024-01-04 RX ORDER — AZITHROMYCIN 250 MG/1
TABLET, FILM COATED ORAL
Qty: 6 TABLET | Refills: 0 | Status: CANCELLED | OUTPATIENT
Start: 2024-01-04 | End: 2024-01-08

## 2024-01-04 RX ORDER — LEVOCETIRIZINE DIHYDROCHLORIDE 5 MG/1
5 TABLET, FILM COATED ORAL NIGHTLY
Qty: 30 TABLET | Refills: 11 | Status: SHIPPED | OUTPATIENT
Start: 2024-01-04 | End: 2025-01-03

## 2024-01-04 RX ORDER — LEVOCETIRIZINE DIHYDROCHLORIDE 5 MG/1
5 TABLET, FILM COATED ORAL NIGHTLY
Qty: 30 TABLET | Refills: 11 | Status: SHIPPED | OUTPATIENT
Start: 2024-01-04 | End: 2024-01-04

## 2024-01-04 RX ORDER — AZITHROMYCIN 250 MG/1
TABLET, FILM COATED ORAL
Qty: 6 TABLET | Refills: 0 | Status: SHIPPED | OUTPATIENT
Start: 2024-01-04 | End: 2024-01-08

## 2024-01-04 RX ORDER — AZITHROMYCIN 250 MG/1
TABLET, FILM COATED ORAL
Qty: 6 TABLET | Refills: 0 | Status: SHIPPED | OUTPATIENT
Start: 2024-01-04 | End: 2024-01-04

## 2024-01-04 RX ORDER — LEVOCETIRIZINE DIHYDROCHLORIDE 5 MG/1
5 TABLET, FILM COATED ORAL NIGHTLY
Qty: 30 TABLET | Refills: 11 | Status: CANCELLED | OUTPATIENT
Start: 2024-01-04 | End: 2025-01-03

## 2024-01-04 NOTE — PROGRESS NOTES
Subjective:       Patient ID: Jarrett Charlton Jr. is a 73 y.o. male.    Chief Complaint: 3 month    HPI  Pt with h/o DM CRI labile hypertension hypotension high output ostomy require weekly IVF multiple trips to ER pt visit today c/o flu symptoms 2 weeks ago he was feeling bad can t see MD had fever 101 but feels better now except still coughing and wheezing no n/v/d  no chest pain high fever he also having CRI consider faxiga for DM  Review of Systems   Constitutional:  Negative for unexpected weight change.   Respiratory:  Negative for shortness of breath.    Cardiovascular:  Negative for chest pain.   Gastrointestinal:  Negative for abdominal pain.   Musculoskeletal:  Negative for back pain.   Psychiatric/Behavioral:  Positive for dysphoric mood.        Objective:      Physical Exam  Vitals and nursing note reviewed.   Constitutional:       General: He is not in acute distress.     Appearance: He is well-developed.   HENT:      Head: Normocephalic and atraumatic.      Right Ear: External ear normal.      Left Ear: External ear normal.      Nose: Nose normal.      Mouth/Throat:      Pharynx: No oropharyngeal exudate.   Eyes:      General:         Right eye: No discharge.         Left eye: No discharge.      Conjunctiva/sclera: Conjunctivae normal.      Pupils: Pupils are equal, round, and reactive to light.   Neck:      Thyroid: No thyromegaly.   Cardiovascular:      Rate and Rhythm: Normal rate and regular rhythm.      Heart sounds: Normal heart sounds. No murmur heard.     No friction rub. No gallop.   Pulmonary:      Effort: Pulmonary effort is normal. No respiratory distress.      Breath sounds: Normal breath sounds. No wheezing or rales.   Chest:      Chest wall: No tenderness.   Abdominal:      General: Bowel sounds are normal. There is no distension.      Palpations: Abdomen is soft.      Tenderness: There is no abdominal tenderness.   Musculoskeletal:         General: No tenderness or deformity. Normal  range of motion.      Cervical back: Normal range of motion and neck supple.   Lymphadenopathy:      Cervical: No cervical adenopathy.   Skin:     General: Skin is warm and dry.      Capillary Refill: Capillary refill takes less than 2 seconds.      Findings: No erythema or rash.   Neurological:      Mental Status: He is alert and oriented to person, place, and time.   Psychiatric:         Thought Content: Thought content normal.         Judgment: Judgment normal.         Assessment:       1. URI with cough and congestion    2. Essential hypertension    3. Stage 3b chronic kidney disease    4. Labile hypertension    5. Type 2 diabetes mellitus with stage 3b chronic kidney disease, without long-term current use of insulin        Plan:       URI with cough and congestion  -     azithromycin (Z-JASE) 250 MG tablet; 2 tabs by mouth day 1, then 1 tab by mouth daily x 4 days  Dispense: 6 tablet; Refill: 0  -     levocetirizine (XYZAL) 5 MG tablet; Take 1 tablet (5 mg total) by mouth every evening.  Dispense: 30 tablet; Refill: 11    Essential hypertension  Comments:  sl high at present time will fluctuate will monitor for now    Stage 3b chronic kidney disease  -     Discontinue: levocetirizine (XYZAL) 5 MG tablet; Take 1 tablet (5 mg total) by mouth every evening.  Dispense: 30 tablet; Refill: 11  -     Discontinue: azithromycin (Z-JASE) 250 MG tablet; 2 tabs by mouth day 1, then 1 tab by mouth daily x 4 days  Dispense: 6 tablet; Refill: 0  -     CBC Auto Differential; Future; Expected date: 01/04/2024  -     Comprehensive Metabolic Panel; Future; Expected date: 01/04/2024  -     PHOSPHORUS; Future; Expected date: 01/04/2024  -     PTH, intact; Future; Expected date: 01/04/2024  -     Urinalysis; Future; Expected date: 01/04/2024  -     Urinalysis; Future; Expected date: 01/04/2024    Labile hypertension  -     CBC Auto Differential; Future; Expected date: 01/04/2024  -     Comprehensive Metabolic Panel; Future; Expected  date: 01/04/2024    Type 2 diabetes mellitus with stage 3b chronic kidney disease, without long-term current use of insulin  -     Hemoglobin A1C; Future; Expected date: 01/04/2024        Medication List with Changes/Refills   New Medications    AZITHROMYCIN (Z-JASE) 250 MG TABLET    2 tabs by mouth day 1, then 1 tab by mouth daily x 4 days    LEVOCETIRIZINE (XYZAL) 5 MG TABLET    Take 1 tablet (5 mg total) by mouth every evening.   Current Medications    ACCU-CHEK PAUL CONTROL SOLN SOLN    1 drop by NOT APPLICABLE route once daily.    ACCU-CHEK GUIDE TEST STRIPS STRP    TEST BLOOD SUGAR THREE TIMES DAILY    ACCU-CHEK SOFTCLIX LANCETS MISC    TEST BLOOD SUGAR THREE TIMES DAILY    ASPIRIN (ECOTRIN) 81 MG EC TABLET    Take 1 tablet (81 mg total) by mouth once daily.    BLOOD-GLUCOSE METER (ACCU-CHEK GUIDE GLUCOSE METER) [facility]    Accucheck BID    CALCIUM-VITAMIN D3 (OS-DAKOTA 500 + D3) 500 MG-5 MCG (200 UNIT) PER TABLET    Take 1 tablet by mouth once daily.    CAPSAICIN 0.1 % CREA    Apply painful joint TID    CHOLESTYRAMINE-ASPARTAME (QUESTRAN LIGHT) 4 GRAM PWPK    Take 1 packet (4 g total) by mouth 3 (three) times daily with meals.    COLCHICINE (COLCRYS) 0.6 MG TABLET    Take 1 tablet (0.6 mg total) by mouth daily as needed (Gout Flare).    DROPSAFE ALCOHOL PREP PADS PADM    USE TOPICALLY 5 (FIVE) TIMES DAILY.    FLUDROCORTISONE (FLORINEF) 0.1 MG TAB    Take 1 tablet (100 mcg total) by mouth once daily.    GABAPENTIN (NEURONTIN) 100 MG CAPSULE    Take 2 capsules (200 mg total) by mouth 3 (three) times daily.    GLIMEPIRIDE (AMARYL) 4 MG TABLET    TAKE 1 TABLET (4 MG TOTAL) BY MOUTH 2 (TWO) TIMES DAILY BEFORE MEALS.    HYDRALAZINE (APRESOLINE) 25 MG TABLET    TAKE 1 TABLET THREE TIMES DAILY AS NEEDED FOR HIGH BLOOD PRESSURE: SYSTOLIC ABOVE 180 OR DIASTOLIC ABOVE 100.    LIDOCAINE (LIDODERM) 5 %    Place 1 patch onto the skin as needed. Remove & Discard patch within 12 hours or as directed by MD    MAGNESIUM OXIDE  (MAG-OX) 400 MG (241.3 MG MAGNESIUM) TABLET    Take 1 tablet (400 mg total) by mouth once daily.    METOPROLOL SUCCINATE (TOPROL-XL) 25 MG 24 HR TABLET    Take 1 tablet (25 mg total) by mouth once daily. Do not take if there is hypotension (low blood pressure)    MIDODRINE (PROAMATINE) 10 MG TABLET    Take 1 tablet (10 mg total) by mouth daily as needed (Hypotension).    PANTOPRAZOLE (PROTONIX) 40 MG TABLET    Take 1 tablet (40 mg total) by mouth once daily.    PRAVASTATIN (PRAVACHOL) 40 MG TABLET    Take 1 tablet (40 mg total) by mouth once daily.    PREDNISONE (DELTASONE) 20 MG TABLET    Take 1 tablet (20 mg total) by mouth 2 (two) times daily as needed (Gout Flare).    PYRIDOSTIGMINE BROMIDE 30 MG TAB    Take 30 mg by mouth every 8 (eight) hours.    SODIUM BICARBONATE 650 MG TABLET    Take 1 tablet (650 mg total) by mouth once daily.

## 2024-01-05 ENCOUNTER — INFUSION (OUTPATIENT)
Dept: INFECTIOUS DISEASES | Facility: HOSPITAL | Age: 74
End: 2024-01-05
Payer: MEDICARE

## 2024-01-05 VITALS
OXYGEN SATURATION: 98 % | RESPIRATION RATE: 16 BRPM | HEIGHT: 74 IN | TEMPERATURE: 99 F | BODY MASS INDEX: 26.78 KG/M2 | SYSTOLIC BLOOD PRESSURE: 148 MMHG | HEART RATE: 79 BPM | WEIGHT: 208.69 LBS | DIASTOLIC BLOOD PRESSURE: 98 MMHG

## 2024-01-05 DIAGNOSIS — E86.0 DEHYDRATION: ICD-10-CM

## 2024-01-05 DIAGNOSIS — E11.22 TYPE 2 DIABETES MELLITUS WITH STAGE 3B CHRONIC KIDNEY DISEASE, WITHOUT LONG-TERM CURRENT USE OF INSULIN: Primary | ICD-10-CM

## 2024-01-05 DIAGNOSIS — N18.32 TYPE 2 DIABETES MELLITUS WITH STAGE 3B CHRONIC KIDNEY DISEASE, WITHOUT LONG-TERM CURRENT USE OF INSULIN: Primary | ICD-10-CM

## 2024-01-05 DIAGNOSIS — Z93.3 COLOSTOMY PRESENT: ICD-10-CM

## 2024-01-05 PROCEDURE — 25000003 PHARM REV CODE 250: Mod: HCNC | Performed by: NURSE PRACTITIONER

## 2024-01-05 PROCEDURE — 96365 THER/PROPH/DIAG IV INF INIT: CPT | Mod: HCNC

## 2024-01-05 RX ORDER — HEPARIN 100 UNIT/ML
500 SYRINGE INTRAVENOUS
Status: CANCELLED | OUTPATIENT
Start: 2024-01-09

## 2024-01-05 RX ORDER — SODIUM CHLORIDE 0.9 % (FLUSH) 0.9 %
10 SYRINGE (ML) INJECTION
Status: CANCELLED | OUTPATIENT
Start: 2024-01-09

## 2024-01-05 RX ADMIN — SODIUM CHLORIDE 1000 ML: 9 INJECTION, SOLUTION INTRAVENOUS at 08:01

## 2024-01-05 NOTE — PLAN OF CARE
Patient counseled on monitoring and reporting s/s of hypovolemia to provider - verbalized understanding

## 2024-01-06 NOTE — TELEPHONE ENCOUNTER
----- Message from Trish Bravo sent at 2/4/2019  4:54 PM CST -----  Contact: Mirna blount/Jarrett [phone]  Please call her with the diagnosis code for the lomotil.  Thank you!   None

## 2024-01-09 ENCOUNTER — INFUSION (OUTPATIENT)
Dept: INFECTIOUS DISEASES | Facility: HOSPITAL | Age: 74
End: 2024-01-09
Payer: MEDICARE

## 2024-01-09 ENCOUNTER — TELEPHONE (OUTPATIENT)
Dept: PRIMARY CARE CLINIC | Facility: CLINIC | Age: 74
End: 2024-01-09
Payer: MEDICARE

## 2024-01-09 VITALS
DIASTOLIC BLOOD PRESSURE: 119 MMHG | OXYGEN SATURATION: 98 % | SYSTOLIC BLOOD PRESSURE: 184 MMHG | TEMPERATURE: 98 F | RESPIRATION RATE: 20 BRPM | BODY MASS INDEX: 27.14 KG/M2 | HEART RATE: 98 BPM | WEIGHT: 211.44 LBS | HEIGHT: 74 IN

## 2024-01-09 DIAGNOSIS — E86.0 DEHYDRATION: ICD-10-CM

## 2024-01-09 DIAGNOSIS — N18.32 TYPE 2 DIABETES MELLITUS WITH STAGE 3B CHRONIC KIDNEY DISEASE, WITHOUT LONG-TERM CURRENT USE OF INSULIN: Primary | ICD-10-CM

## 2024-01-09 DIAGNOSIS — E11.22 TYPE 2 DIABETES MELLITUS WITH STAGE 3B CHRONIC KIDNEY DISEASE, WITHOUT LONG-TERM CURRENT USE OF INSULIN: Primary | ICD-10-CM

## 2024-01-09 DIAGNOSIS — C61 PROSTATE CANCER: Primary | ICD-10-CM

## 2024-01-09 DIAGNOSIS — Z93.3 COLOSTOMY PRESENT: ICD-10-CM

## 2024-01-09 PROCEDURE — 96360 HYDRATION IV INFUSION INIT: CPT | Mod: HCNC

## 2024-01-09 PROCEDURE — 25000003 PHARM REV CODE 250: Mod: HCNC | Performed by: NURSE PRACTITIONER

## 2024-01-09 RX ORDER — HEPARIN 100 UNIT/ML
500 SYRINGE INTRAVENOUS
Status: DISCONTINUED | OUTPATIENT
Start: 2024-01-09 | End: 2024-01-09 | Stop reason: HOSPADM

## 2024-01-09 RX ORDER — SODIUM CHLORIDE 0.9 % (FLUSH) 0.9 %
10 SYRINGE (ML) INJECTION
Status: CANCELLED | OUTPATIENT
Start: 2024-01-12

## 2024-01-09 RX ORDER — SODIUM CHLORIDE 0.9 % (FLUSH) 0.9 %
10 SYRINGE (ML) INJECTION
Status: DISCONTINUED | OUTPATIENT
Start: 2024-01-09 | End: 2024-01-09 | Stop reason: HOSPADM

## 2024-01-09 RX ORDER — HEPARIN 100 UNIT/ML
500 SYRINGE INTRAVENOUS
Status: CANCELLED | OUTPATIENT
Start: 2024-01-12

## 2024-01-09 RX ADMIN — SODIUM CHLORIDE 1000 ML: 9 INJECTION, SOLUTION INTRAVENOUS at 08:01

## 2024-01-09 NOTE — TELEPHONE ENCOUNTER
Called patient and notified him with lab results.  States that he was requesting a PSA level when the labs were added but the order was not put in. I just spoke with Dr. Gonzalez and he states that it is ok to add PSA order.

## 2024-01-12 ENCOUNTER — INFUSION (OUTPATIENT)
Dept: INFECTIOUS DISEASES | Facility: HOSPITAL | Age: 74
End: 2024-01-12
Payer: MEDICARE

## 2024-01-12 VITALS
OXYGEN SATURATION: 99 % | RESPIRATION RATE: 20 BRPM | TEMPERATURE: 98 F | HEART RATE: 97 BPM | WEIGHT: 213.75 LBS | BODY MASS INDEX: 27.43 KG/M2 | SYSTOLIC BLOOD PRESSURE: 193 MMHG | HEIGHT: 74 IN | DIASTOLIC BLOOD PRESSURE: 104 MMHG

## 2024-01-12 DIAGNOSIS — N18.32 TYPE 2 DIABETES MELLITUS WITH STAGE 3B CHRONIC KIDNEY DISEASE, WITHOUT LONG-TERM CURRENT USE OF INSULIN: Primary | ICD-10-CM

## 2024-01-12 DIAGNOSIS — E11.22 TYPE 2 DIABETES MELLITUS WITH STAGE 3B CHRONIC KIDNEY DISEASE, WITHOUT LONG-TERM CURRENT USE OF INSULIN: Primary | ICD-10-CM

## 2024-01-12 DIAGNOSIS — Z93.3 COLOSTOMY PRESENT: ICD-10-CM

## 2024-01-12 DIAGNOSIS — E86.0 DEHYDRATION: ICD-10-CM

## 2024-01-12 PROCEDURE — 25000003 PHARM REV CODE 250: Mod: HCNC | Performed by: NURSE PRACTITIONER

## 2024-01-12 PROCEDURE — 96365 THER/PROPH/DIAG IV INF INIT: CPT | Mod: HCNC

## 2024-01-12 RX ORDER — SODIUM CHLORIDE 0.9 % (FLUSH) 0.9 %
10 SYRINGE (ML) INJECTION
Status: DISCONTINUED | OUTPATIENT
Start: 2024-01-12 | End: 2024-01-12 | Stop reason: HOSPADM

## 2024-01-12 RX ORDER — HEPARIN 100 UNIT/ML
500 SYRINGE INTRAVENOUS
Status: CANCELLED | OUTPATIENT
Start: 2024-01-16

## 2024-01-12 RX ORDER — SODIUM CHLORIDE 0.9 % (FLUSH) 0.9 %
10 SYRINGE (ML) INJECTION
Status: CANCELLED | OUTPATIENT
Start: 2024-01-16

## 2024-01-12 RX ORDER — HEPARIN 100 UNIT/ML
500 SYRINGE INTRAVENOUS
Status: DISCONTINUED | OUTPATIENT
Start: 2024-01-12 | End: 2024-01-12 | Stop reason: HOSPADM

## 2024-01-12 RX ADMIN — SODIUM CHLORIDE 1000 ML: 9 INJECTION, SOLUTION INTRAVENOUS at 08:01

## 2024-01-14 ENCOUNTER — CLINICAL SUPPORT (OUTPATIENT)
Dept: CARDIOLOGY | Facility: HOSPITAL | Age: 74
End: 2024-01-14
Payer: MEDICARE

## 2024-01-14 DIAGNOSIS — Z95.818 PRESENCE OF OTHER CARDIAC IMPLANTS AND GRAFTS: ICD-10-CM

## 2024-01-15 ENCOUNTER — CLINICAL SUPPORT (OUTPATIENT)
Dept: CARDIOLOGY | Facility: HOSPITAL | Age: 74
End: 2024-01-15
Attending: INTERNAL MEDICINE
Payer: MEDICARE

## 2024-01-15 DIAGNOSIS — Z95.818 PRESENCE OF OTHER CARDIAC IMPLANTS AND GRAFTS: ICD-10-CM

## 2024-01-15 PROCEDURE — 93298 REM INTERROG DEV EVAL SCRMS: CPT | Mod: 26,HCNC,, | Performed by: INTERNAL MEDICINE

## 2024-01-16 ENCOUNTER — INFUSION (OUTPATIENT)
Dept: INFECTIOUS DISEASES | Facility: HOSPITAL | Age: 74
End: 2024-01-16
Payer: MEDICARE

## 2024-01-16 VITALS
OXYGEN SATURATION: 98 % | SYSTOLIC BLOOD PRESSURE: 170 MMHG | DIASTOLIC BLOOD PRESSURE: 93 MMHG | TEMPERATURE: 98 F | HEIGHT: 74 IN | WEIGHT: 210.63 LBS | RESPIRATION RATE: 18 BRPM | BODY MASS INDEX: 27.03 KG/M2 | HEART RATE: 95 BPM

## 2024-01-16 DIAGNOSIS — E86.0 DEHYDRATION: ICD-10-CM

## 2024-01-16 DIAGNOSIS — N18.32 TYPE 2 DIABETES MELLITUS WITH STAGE 3B CHRONIC KIDNEY DISEASE, WITHOUT LONG-TERM CURRENT USE OF INSULIN: Primary | ICD-10-CM

## 2024-01-16 DIAGNOSIS — E11.22 TYPE 2 DIABETES MELLITUS WITH STAGE 3B CHRONIC KIDNEY DISEASE, WITHOUT LONG-TERM CURRENT USE OF INSULIN: Primary | ICD-10-CM

## 2024-01-16 DIAGNOSIS — Z93.3 COLOSTOMY PRESENT: ICD-10-CM

## 2024-01-16 PROCEDURE — 25000003 PHARM REV CODE 250: Mod: HCNC | Performed by: NURSE PRACTITIONER

## 2024-01-16 PROCEDURE — 96360 HYDRATION IV INFUSION INIT: CPT | Mod: HCNC

## 2024-01-16 RX ORDER — SODIUM CHLORIDE 0.9 % (FLUSH) 0.9 %
10 SYRINGE (ML) INJECTION
Status: CANCELLED | OUTPATIENT
Start: 2024-01-19

## 2024-01-16 RX ORDER — HEPARIN 100 UNIT/ML
500 SYRINGE INTRAVENOUS
Status: CANCELLED | OUTPATIENT
Start: 2024-01-19

## 2024-01-16 RX ADMIN — SODIUM CHLORIDE 1000 ML: 9 INJECTION, SOLUTION INTRAVENOUS at 08:01

## 2024-01-19 ENCOUNTER — INFUSION (OUTPATIENT)
Dept: INFECTIOUS DISEASES | Facility: HOSPITAL | Age: 74
End: 2024-01-19
Payer: MEDICARE

## 2024-01-19 VITALS
HEIGHT: 74 IN | HEART RATE: 85 BPM | RESPIRATION RATE: 20 BRPM | DIASTOLIC BLOOD PRESSURE: 94 MMHG | WEIGHT: 212.75 LBS | TEMPERATURE: 99 F | BODY MASS INDEX: 27.3 KG/M2 | SYSTOLIC BLOOD PRESSURE: 177 MMHG | OXYGEN SATURATION: 99 %

## 2024-01-19 DIAGNOSIS — N18.32 TYPE 2 DIABETES MELLITUS WITH STAGE 3B CHRONIC KIDNEY DISEASE, WITHOUT LONG-TERM CURRENT USE OF INSULIN: Primary | ICD-10-CM

## 2024-01-19 DIAGNOSIS — E11.22 TYPE 2 DIABETES MELLITUS WITH STAGE 3B CHRONIC KIDNEY DISEASE, WITHOUT LONG-TERM CURRENT USE OF INSULIN: Primary | ICD-10-CM

## 2024-01-19 DIAGNOSIS — E86.0 DEHYDRATION: ICD-10-CM

## 2024-01-19 DIAGNOSIS — Z93.3 COLOSTOMY PRESENT: ICD-10-CM

## 2024-01-19 PROCEDURE — 25000003 PHARM REV CODE 250: Mod: HCNC | Performed by: NURSE PRACTITIONER

## 2024-01-19 PROCEDURE — 96360 HYDRATION IV INFUSION INIT: CPT | Mod: HCNC

## 2024-01-19 RX ORDER — HEPARIN 100 UNIT/ML
500 SYRINGE INTRAVENOUS
Status: CANCELLED | OUTPATIENT
Start: 2024-01-23

## 2024-01-19 RX ORDER — SODIUM CHLORIDE 0.9 % (FLUSH) 0.9 %
10 SYRINGE (ML) INJECTION
Status: CANCELLED | OUTPATIENT
Start: 2024-01-23

## 2024-01-19 RX ORDER — ISOPROPYL ALCOHOL 70 ML/100ML
SWAB TOPICAL
Qty: 500 EACH | Refills: 3 | Status: SHIPPED | OUTPATIENT
Start: 2024-01-19

## 2024-01-19 RX ADMIN — SODIUM CHLORIDE 1000 ML: 9 INJECTION, SOLUTION INTRAVENOUS at 08:01

## 2024-01-19 NOTE — TELEPHONE ENCOUNTER
Jarrett Charlton  is requesting a refill authorization.  Brief Assessment and Rationale for Refill:  Approve     Medication Therapy Plan:         Comments:     Note composed:11:45 AM 01/19/2024

## 2024-01-22 RX ORDER — GLIMEPIRIDE 4 MG/1
4 TABLET ORAL
Qty: 180 TABLET | Refills: 1 | Status: SHIPPED | OUTPATIENT
Start: 2024-01-22 | End: 2024-05-06 | Stop reason: SDUPTHER

## 2024-01-22 NOTE — TELEPHONE ENCOUNTER
Refill Routing Note   Medication(s) are not appropriate for processing by [facility] for the following reason(s):      - NO PCP LISTED IN EPIC; ROUTING TO DR GONZALEZ AS LAST PRESCRIBING PROVIDER    [facility] action(s):  Route          Medication reconciliation completed: No     Appointments  past 12m or future 3m with PCP    Date Provider   Last Visit   1/4/2024 Poli Gonzalez MD   Next Visit   4/30/2024 Poli Gonzalez MD   ED visits in past 90 days: 1        Note composed:11:56 AM 01/22/2024

## 2024-01-22 NOTE — PLAN OF CARE
Problem: Adult Inpatient Plan of Care  Goal: Plan of Care Review  Outcome: Ongoing, Progressing      Home

## 2024-01-23 ENCOUNTER — INFUSION (OUTPATIENT)
Dept: INFECTIOUS DISEASES | Facility: HOSPITAL | Age: 74
End: 2024-01-23
Payer: MEDICARE

## 2024-01-23 VITALS
OXYGEN SATURATION: 96 % | HEIGHT: 74 IN | RESPIRATION RATE: 18 BRPM | TEMPERATURE: 99 F | WEIGHT: 209.44 LBS | BODY MASS INDEX: 26.88 KG/M2 | DIASTOLIC BLOOD PRESSURE: 99 MMHG | SYSTOLIC BLOOD PRESSURE: 179 MMHG | HEART RATE: 81 BPM

## 2024-01-23 DIAGNOSIS — Z93.3 COLOSTOMY PRESENT: ICD-10-CM

## 2024-01-23 DIAGNOSIS — E86.0 DEHYDRATION: ICD-10-CM

## 2024-01-23 DIAGNOSIS — E11.22 TYPE 2 DIABETES MELLITUS WITH STAGE 3B CHRONIC KIDNEY DISEASE, WITHOUT LONG-TERM CURRENT USE OF INSULIN: Primary | ICD-10-CM

## 2024-01-23 DIAGNOSIS — N18.32 TYPE 2 DIABETES MELLITUS WITH STAGE 3B CHRONIC KIDNEY DISEASE, WITHOUT LONG-TERM CURRENT USE OF INSULIN: Primary | ICD-10-CM

## 2024-01-23 PROCEDURE — 25000003 PHARM REV CODE 250: Mod: HCNC | Performed by: NURSE PRACTITIONER

## 2024-01-23 PROCEDURE — 96360 HYDRATION IV INFUSION INIT: CPT | Mod: HCNC

## 2024-01-23 RX ORDER — HEPARIN 100 UNIT/ML
500 SYRINGE INTRAVENOUS
Status: CANCELLED | OUTPATIENT
Start: 2024-01-26

## 2024-01-23 RX ORDER — SODIUM CHLORIDE 0.9 % (FLUSH) 0.9 %
10 SYRINGE (ML) INJECTION
Status: DISCONTINUED | OUTPATIENT
Start: 2024-01-23 | End: 2024-01-23 | Stop reason: HOSPADM

## 2024-01-23 RX ORDER — HEPARIN 100 UNIT/ML
500 SYRINGE INTRAVENOUS
Status: DISCONTINUED | OUTPATIENT
Start: 2024-01-23 | End: 2024-01-23 | Stop reason: HOSPADM

## 2024-01-23 RX ORDER — SODIUM CHLORIDE 0.9 % (FLUSH) 0.9 %
10 SYRINGE (ML) INJECTION
Status: CANCELLED | OUTPATIENT
Start: 2024-01-26

## 2024-01-23 RX ADMIN — SODIUM CHLORIDE 1000 ML: 9 INJECTION, SOLUTION INTRAVENOUS at 08:01

## 2024-01-26 ENCOUNTER — INFUSION (OUTPATIENT)
Dept: INFECTIOUS DISEASES | Facility: HOSPITAL | Age: 74
End: 2024-01-26
Payer: MEDICARE

## 2024-01-26 ENCOUNTER — PATIENT MESSAGE (OUTPATIENT)
Dept: PRIMARY CARE CLINIC | Facility: CLINIC | Age: 74
End: 2024-01-26
Payer: MEDICARE

## 2024-01-26 VITALS
RESPIRATION RATE: 19 BRPM | BODY MASS INDEX: 26.92 KG/M2 | WEIGHT: 209.75 LBS | HEIGHT: 74 IN | DIASTOLIC BLOOD PRESSURE: 89 MMHG | OXYGEN SATURATION: 96 % | HEART RATE: 101 BPM | SYSTOLIC BLOOD PRESSURE: 188 MMHG | TEMPERATURE: 98 F

## 2024-01-26 DIAGNOSIS — E11.22 TYPE 2 DIABETES MELLITUS WITH STAGE 3B CHRONIC KIDNEY DISEASE, WITHOUT LONG-TERM CURRENT USE OF INSULIN: Primary | ICD-10-CM

## 2024-01-26 DIAGNOSIS — E86.0 DEHYDRATION: ICD-10-CM

## 2024-01-26 DIAGNOSIS — N18.32 TYPE 2 DIABETES MELLITUS WITH STAGE 3B CHRONIC KIDNEY DISEASE, WITHOUT LONG-TERM CURRENT USE OF INSULIN: Primary | ICD-10-CM

## 2024-01-26 DIAGNOSIS — Z93.3 COLOSTOMY PRESENT: ICD-10-CM

## 2024-01-26 PROCEDURE — 25000003 PHARM REV CODE 250: Mod: HCNC | Performed by: NURSE PRACTITIONER

## 2024-01-26 PROCEDURE — 96365 THER/PROPH/DIAG IV INF INIT: CPT | Mod: HCNC

## 2024-01-26 RX ORDER — SODIUM CHLORIDE 0.9 % (FLUSH) 0.9 %
10 SYRINGE (ML) INJECTION
Status: CANCELLED | OUTPATIENT
Start: 2024-01-30

## 2024-01-26 RX ORDER — HEPARIN 100 UNIT/ML
500 SYRINGE INTRAVENOUS
Status: CANCELLED | OUTPATIENT
Start: 2024-01-30

## 2024-01-26 RX ADMIN — SODIUM CHLORIDE 1000 ML: 9 INJECTION, SOLUTION INTRAVENOUS at 08:01

## 2024-01-29 NOTE — TELEPHONE ENCOUNTER
----- Message from Madie Mederos sent at 1/29/2024 12:23 PM CST -----  Contact: [phone]@[email or web address]  Requesting an RX refill or new RX.pantoprazole (PROTONIX) 40 MG tablet  Is this a refill or new RX: refill  RX name and strength (copy/paste from chart):    Is this a 30 day or 90 day RX:   Pharmacy name and phone # [facility] - [facility] [address]  The doctors have asked that we provide their patients with the following 2 reminders -- prescription refills can take up to 72 hours, and a friendly reminder that in the future you can use your [facility] account to request refills:

## 2024-01-29 NOTE — TELEPHONE ENCOUNTER
Refill Routing Note   Medication(s) are not appropriate for processing by [facility] for the following reason(s):        Responsible provider unclear    [facility] action(s):  Route             Appointments  past 12m or future 3m with PCP    Date Provider   Last Visit   1/4/2024 Poli Gonzalez MD   Next Visit   4/30/2024 Poli Gonzalez MD   ED visits in past 90 days: 1        Note composed:1:44 PM 01/29/2024

## 2024-01-30 ENCOUNTER — INFUSION (OUTPATIENT)
Dept: INFECTIOUS DISEASES | Facility: HOSPITAL | Age: 74
End: 2024-01-30
Payer: MEDICARE

## 2024-01-30 VITALS
HEIGHT: 74 IN | TEMPERATURE: 98 F | OXYGEN SATURATION: 98 % | WEIGHT: 210.75 LBS | SYSTOLIC BLOOD PRESSURE: 189 MMHG | BODY MASS INDEX: 27.05 KG/M2 | HEART RATE: 88 BPM | DIASTOLIC BLOOD PRESSURE: 98 MMHG | RESPIRATION RATE: 18 BRPM

## 2024-01-30 DIAGNOSIS — Z93.3 COLOSTOMY PRESENT: ICD-10-CM

## 2024-01-30 DIAGNOSIS — E86.0 DEHYDRATION: ICD-10-CM

## 2024-01-30 DIAGNOSIS — E11.22 TYPE 2 DIABETES MELLITUS WITH STAGE 3B CHRONIC KIDNEY DISEASE, WITHOUT LONG-TERM CURRENT USE OF INSULIN: Primary | ICD-10-CM

## 2024-01-30 DIAGNOSIS — N18.32 TYPE 2 DIABETES MELLITUS WITH STAGE 3B CHRONIC KIDNEY DISEASE, WITHOUT LONG-TERM CURRENT USE OF INSULIN: Primary | ICD-10-CM

## 2024-01-30 PROCEDURE — 96360 HYDRATION IV INFUSION INIT: CPT | Mod: HCNC

## 2024-01-30 PROCEDURE — 25000003 PHARM REV CODE 250: Mod: HCNC | Performed by: NURSE PRACTITIONER

## 2024-01-30 RX ORDER — HEPARIN 100 UNIT/ML
500 SYRINGE INTRAVENOUS
Status: CANCELLED | OUTPATIENT
Start: 2024-02-02

## 2024-01-30 RX ORDER — PANTOPRAZOLE SODIUM 40 MG/1
40 TABLET, DELAYED RELEASE ORAL DAILY
Qty: 90 TABLET | Refills: 1 | Status: SHIPPED | OUTPATIENT
Start: 2024-01-30 | End: 2024-06-13

## 2024-01-30 RX ORDER — SODIUM CHLORIDE 0.9 % (FLUSH) 0.9 %
10 SYRINGE (ML) INJECTION
Status: CANCELLED | OUTPATIENT
Start: 2024-02-02

## 2024-01-30 RX ORDER — SODIUM CHLORIDE 0.9 % (FLUSH) 0.9 %
10 SYRINGE (ML) INJECTION
Status: DISCONTINUED | OUTPATIENT
Start: 2024-01-30 | End: 2024-01-30 | Stop reason: HOSPADM

## 2024-01-30 RX ORDER — HEPARIN 100 UNIT/ML
500 SYRINGE INTRAVENOUS
Status: DISCONTINUED | OUTPATIENT
Start: 2024-01-30 | End: 2024-01-30 | Stop reason: HOSPADM

## 2024-01-30 RX ADMIN — SODIUM CHLORIDE 1000 ML: 9 INJECTION, SOLUTION INTRAVENOUS at 08:01

## 2024-01-31 ENCOUNTER — TELEPHONE (OUTPATIENT)
Dept: ELECTROPHYSIOLOGY | Facility: CLINIC | Age: 74
End: 2024-01-31
Payer: MEDICARE

## 2024-01-31 NOTE — TELEPHONE ENCOUNTER
[facility] ICM alerts for tachycardia  Report indicates several episodes of probable nsAT/SVT, maximum duration 18 mins 32 secs on 1/28/24; abrupt onset/offsets noted  All recorded tachy events took place between 1/28/24-1/29/24    Telephoned patient who indicated he has not been feeling well for the past 4-5 days  Denies any specific illness or virus  Has an appt tomorrow with Dr. Marrufo  Was seen in the hospital 12/7 for LOC at which time metoprolol was discontinued  He remains off metoprolol due to syncope and low BP, blood pressures are presently higher per patient  Taking midodrine    Will notify Isabella Taylor and Yaron of recent increase in tachycardias x 2 days

## 2024-02-01 ENCOUNTER — OFFICE VISIT (OUTPATIENT)
Dept: CARDIOLOGY | Facility: CLINIC | Age: 74
End: 2024-02-01
Payer: MEDICARE

## 2024-02-01 VITALS
DIASTOLIC BLOOD PRESSURE: 71 MMHG | BODY MASS INDEX: 26.53 KG/M2 | HEART RATE: 87 BPM | WEIGHT: 206.69 LBS | SYSTOLIC BLOOD PRESSURE: 105 MMHG | HEIGHT: 74 IN | OXYGEN SATURATION: 97 %

## 2024-02-01 DIAGNOSIS — I10 ESSENTIAL HYPERTENSION: ICD-10-CM

## 2024-02-01 DIAGNOSIS — R07.9 CHEST PAIN, UNSPECIFIED TYPE: ICD-10-CM

## 2024-02-01 DIAGNOSIS — I45.2 BIFASCICULAR BLOCK: ICD-10-CM

## 2024-02-01 DIAGNOSIS — I47.20 VT (VENTRICULAR TACHYCARDIA): Primary | ICD-10-CM

## 2024-02-01 DIAGNOSIS — I71.21 ASCENDING AORTIC ANEURYSM, UNSPECIFIED WHETHER RUPTURED: ICD-10-CM

## 2024-02-01 DIAGNOSIS — I25.10 CORONARY ARTERY DISEASE INVOLVING NATIVE CORONARY ARTERY OF NATIVE HEART WITHOUT ANGINA PECTORIS: ICD-10-CM

## 2024-02-01 DIAGNOSIS — I16.0 HYPERTENSIVE URGENCY: ICD-10-CM

## 2024-02-01 DIAGNOSIS — E78.2 MIXED HYPERLIPIDEMIA: ICD-10-CM

## 2024-02-01 DIAGNOSIS — I47.29 PAROXYSMAL VENTRICULAR TACHYCARDIA: ICD-10-CM

## 2024-02-01 DIAGNOSIS — R79.89 ELEVATED TROPONIN: ICD-10-CM

## 2024-02-01 DIAGNOSIS — R94.31 PROLONGED QT INTERVAL: ICD-10-CM

## 2024-02-01 DIAGNOSIS — I70.0 AORTIC ATHEROSCLEROSIS: ICD-10-CM

## 2024-02-01 DIAGNOSIS — R94.31 ABNORMAL EKG: ICD-10-CM

## 2024-02-01 DIAGNOSIS — I95.1 SYNCOPE DUE TO ORTHOSTATIC HYPOTENSION: ICD-10-CM

## 2024-02-01 DIAGNOSIS — Z95.828 PRESENCE OF ARTERIAL STENT: ICD-10-CM

## 2024-02-01 PROCEDURE — 99999 PR PBB SHADOW E&M-EST. PATIENT-LVL IV: CPT | Mod: PBBFAC,HCNC,, | Performed by: INTERNAL MEDICINE

## 2024-02-01 PROCEDURE — 3044F HG A1C LEVEL LT 7.0%: CPT | Mod: HCNC,CPTII,S$GLB, | Performed by: INTERNAL MEDICINE

## 2024-02-01 PROCEDURE — 1100F PTFALLS ASSESS-DOCD GE2>/YR: CPT | Mod: HCNC,CPTII,S$GLB, | Performed by: INTERNAL MEDICINE

## 2024-02-01 PROCEDURE — 3288F FALL RISK ASSESSMENT DOCD: CPT | Mod: HCNC,CPTII,S$GLB, | Performed by: INTERNAL MEDICINE

## 2024-02-01 PROCEDURE — 3074F SYST BP LT 130 MM HG: CPT | Mod: HCNC,CPTII,S$GLB, | Performed by: INTERNAL MEDICINE

## 2024-02-01 PROCEDURE — 1126F AMNT PAIN NOTED NONE PRSNT: CPT | Mod: HCNC,CPTII,S$GLB, | Performed by: INTERNAL MEDICINE

## 2024-02-01 PROCEDURE — 3078F DIAST BP <80 MM HG: CPT | Mod: HCNC,CPTII,S$GLB, | Performed by: INTERNAL MEDICINE

## 2024-02-01 PROCEDURE — 1160F RVW MEDS BY RX/DR IN RCRD: CPT | Mod: HCNC,CPTII,S$GLB, | Performed by: INTERNAL MEDICINE

## 2024-02-01 PROCEDURE — 1159F MED LIST DOCD IN RCRD: CPT | Mod: HCNC,CPTII,S$GLB, | Performed by: INTERNAL MEDICINE

## 2024-02-01 PROCEDURE — 99215 OFFICE O/P EST HI 40 MIN: CPT | Mod: HCNC,S$GLB,, | Performed by: INTERNAL MEDICINE

## 2024-02-01 PROCEDURE — 3008F BODY MASS INDEX DOCD: CPT | Mod: HCNC,CPTII,S$GLB, | Performed by: INTERNAL MEDICINE

## 2024-02-01 RX ORDER — MIDODRINE HYDROCHLORIDE 10 MG/1
10 TABLET ORAL 3 TIMES DAILY PRN
Qty: 270 TABLET | Refills: 1 | Status: SHIPPED | OUTPATIENT
Start: 2024-02-01 | End: 2024-05-06 | Stop reason: SDUPTHER

## 2024-02-01 NOTE — PROGRESS NOTES
"[facility] [address] [address]  Cardiology Clinic Note      Chief Complaint  Chief Complaint   Patient presents with    Follow-up       HPI:    72-year-old male with a past medical history SBO, s/p colostomy, GERD, parotid mass s/p resection, BPH & prostate CA s/p TURP, CKD 4, pleural plaque, ("neurogenic") orthostatic hypotension, hypertension, hyperlipidemia, DM2, carotid stenosis not hemodynamically significant ultrasound 03/2023, ascending thoracic aorta 4.4 cm CT 2018 then 4.7 cm in 2023, lower extremity arterial duplex 09/2022 no evidence of hemodynamically significant stenosis nonvisualization of the peroneal arteries prior SAKSHI 2019 normal, CAD status post PCI to mid LAD 2017 followed by cardiac catheterization 2021 patent stent nonobstructive disease prior MPI 2018 inferolateral ischemia, sustained ventricular tachycardia documented on discharge summary 4/2021 on amiodarone previous loop recorder implantation 2021 presumed link transmission showed several episodes of ventricular tachycardia with longest episode 36 beats also documented either atrial fibrillation RVR with aberrant conduction or AV radha reentry tachycardia (this was documented by an outside provider note scan 03/2021), echo 09/2023 normal EF grade 1 diastolic dysfunction mild LVH normal RV prior echo 12/2022 normal EF mild LVH diastolic function indeterminate normal RV PASP 32+ CVP which could not be determined ascending aorta mildly dilated      Hospitalized with cholestatic jaundice s/p cholecystectomy with lysis of adhesions 2/2023     Nephrology has IVF scheduled twice weekly secondary to losses from ostomy     Neurology is following as well suspected autonomic dysfunction with orthostatic component      Seen by gastroenterology as well to add bulk to stool      Last note from outside cardiology which states patient has pAF in note scan 2/3/2023   Seen by electrophysiology amiodarone was held and suggested loop recorder.  AF episodes " seen on previous monitoring were most consistent with artifact.     Patient has been managing his labile hypertension with p.r.n. hydralazine but he was still taking Toprol for other indications     PCP placed patient on oxybutynin for high ileostomy output      Frequently seen in the emergency department for hypotension/volume depletion  PEC'd for suicidal ideation at 1 of these ER visits     Seen by Cardiothoracic surgery for aortic aneurysm     Most recently consulted Endocrinology to rule out adrenal insufficiency as the patient is no longer tolerating his metoprolol.  Appears that endocrinology has not addressed this as of yet.     The patient was admitted 11/2023 for lightheadedness/hypotension which has resolved following IVF.  Patient reports syncopal event while in emergency room.  Subsequent ER visit with syncope.  Data reviewed.    Blood pressure looks good today  Started a trial of pyridostigmine per Neurology     Notified of tachyarrhythmia on the patient's loop recorder forwarded to electrophysiology.    Medications  Current Outpatient Medications   Medication Sig Dispense Refill    aspirin (ECOTRIN) 81 MG EC tablet Take 1 tablet (81 mg total) by mouth once daily. 30 tablet 11    calcium-vitamin D3 (OS-DAKOTA 500 + D3) 500 mg-5 mcg (200 unit) per tablet Take 1 tablet by mouth once daily. 30 tablet 1    cholestyramine-aspartame (QUESTRAN LIGHT) 4 gram PwPk Take 1 packet (4 g total) by mouth 3 (three) times daily with meals. 270 packet 1    fludrocortisone (FLORINEF) 0.1 mg Tab Take 1 tablet (100 mcg total) by mouth once daily. 180 tablet 0    gabapentin (NEURONTIN) 100 MG capsule Take 2 capsules (200 mg total) by mouth 3 (three) times daily. 180 capsule 1    glimepiride (AMARYL) 4 MG tablet TAKE 1 TABLET TWICE DAILY BEFORE MEALS 180 tablet 1    hydrALAZINE (APRESOLINE) 25 MG tablet TAKE 1 TABLET THREE TIMES DAILY AS NEEDED FOR HIGH BLOOD PRESSURE: SYSTOLIC ABOVE 180 OR DIASTOLIC ABOVE 100. 90 tablet 1     magnesium oxide (MAG-OX) 400 mg (241.3 mg magnesium) tablet Take 1 tablet (400 mg total) by mouth once daily. 30 tablet 2    midodrine (PROAMATINE) 10 MG tablet Take 1 tablet (10 mg total) by mouth daily as needed (Hypotension). (Patient taking differently: Take 30 mg by mouth daily as needed (Hypotension).) 90 tablet 3    pantoprazole (PROTONIX) 40 MG tablet Take 1 tablet (40 mg total) by mouth once daily. 90 tablet 1    pravastatin (PRAVACHOL) 40 MG tablet Take 1 tablet (40 mg total) by mouth once daily. 90 tablet 3    sodium bicarbonate 650 MG tablet Take 1 tablet (650 mg total) by mouth once daily. 90 tablet 3    ACCU-CHEK PAUL CONTROL SOLN Soln 1 drop by NOT APPLICABLE route once daily.      ACCU-CHEK GUIDE TEST STRIPS Strp TEST BLOOD SUGAR THREE TIMES DAILY 300 strip 10    ACCU-CHEK SOFTCLIX LANCETS Misc TEST BLOOD SUGAR THREE TIMES DAILY 300 each 3    blood-glucose meter (ACCU-CHEK GUIDE GLUCOSE METER) [facility] Accucheck BID 1 each 0    capsaicin 0.1 % Crea Apply painful joint TID 56.6 g 1    colchicine (COLCRYS) 0.6 mg tablet Take 1 tablet (0.6 mg total) by mouth daily as needed (Gout Flare). 10 tablet 0    DROPSAFE ALCOHOL PREP PADS PadM USE TOPICALLY 5 TIMES DAILY. 500 each 3    levocetirizine (XYZAL) 5 MG tablet Take 1 tablet (5 mg total) by mouth every evening. 30 tablet 11    LIDOcaine (LIDODERM) 5 % Place 1 patch onto the skin as needed. Remove & Discard patch within 12 hours or as directed by MD 30 patch 0    metoprolol succinate (TOPROL-XL) 25 MG 24 hr tablet Take 1 tablet (25 mg total) by mouth once daily. Do not take if there is hypotension (low blood pressure) (Patient not taking: Reported on 2/1/2024) 90 tablet 1    predniSONE (DELTASONE) 20 MG tablet Take 1 tablet (20 mg total) by mouth 2 (two) times daily as needed (Gout Flare). 8 tablet 0    pyRIDostigmine bromide 30 mg Tab Take 30 mg by mouth every 8 (eight) hours. 90 tablet 1     No current facility-administered medications for this visit.      Facility-Administered Medications Ordered in Other Visits   Medication Dose Route Frequency Provider Last Rate Last Admin    sodium chloride 0.9% in 1,000 mL infusion   Intravenous Continuous Order, Paper            History  Past Medical History:   Diagnosis Date    Basal cell carcinoma     BPH (benign prostatic hyperplasia)     s/p TURP    Carotid stenosis     Chronic kidney disease     Claudication     Coronary artery disease     DDD (degenerative disc disease) 10/21/2013    Diabetes mellitus with renal complications     Disc disease, degenerative, cervical     Encounter for blood transfusion     GERD (gastroesophageal reflux disease)     Gout, chronic     History of ulcerative colitis     s/p colectomy and ileostomy    HLD (hyperlipidemia)     HTN (hypertension)     Ileostomy in place 1982    RBBB     Squamous cell carcinoma 03/08/2018    Left superior helix near insertion    Squamous cell carcinoma 04/12/2018    Left forearm x 5    Ventricular tachycardia      Past Surgical History:   Procedure Laterality Date    cardiac stents      CATARACT EXTRACTION Bilateral     CERVICAL FUSION      CHOLECYSTECTOMY N/A 2/14/2023    Procedure: CHOLECYSTECTOMY;  Surgeon: Mike Joyce MD;  Location: [facility];  Service: General;  Laterality: N/A;  very high probabilty of converison to open    colectomy and ileostomy  1985    ENDOSCOPIC ULTRASOUND OF UPPER GASTROINTESTINAL TRACT N/A 2/10/2023    Procedure: ULTRASOUND, UPPER GI TRACT, ENDOSCOPIC;  Surgeon: Poli Fuller MD;  Location: [facility];  Service: Endoscopy;  Laterality: N/A;    ERCP N/A 2/10/2023    Procedure: ERCP (ENDOSCOPIC RETROGRADE CHOLANGIOPANCREATOGRAPHY);  Surgeon: Poli Fuller MD;  Location: [facility] ENDO;  Service: Endoscopy;  Laterality: N/A;    EXCISION OF PAROTID GLAND Left 12/18/2020    Procedure: EXCISION, PAROTID GLAND;  Surgeon: Michael Pinzon MD;  Location: [address];  Service: ENT;  Laterality: Left;    INSERTION OF IMPLANTABLE LOOP  RECORDER  06/07/2021    INSERTION OF IMPLANTABLE LOOP RECORDER Left 6/7/2021    Procedure: INSERTION, IMPLANTABLE LOOP RECORDER;  Surgeon: Romario Dao MD;  Location: [facility] CATH LAB;  Service: Cardiology;  Laterality: Left;    LEFT HEART CATHETERIZATION Right 4/15/2021    Procedure: CATHETERIZATION, HEART, LEFT;  Surgeon: Marcio Jones MD;  Location: [facility] CATH LAB;  Service: Cardiology;  Laterality: Right;    LYSIS OF ADHESIONS N/A 11/9/2020    Procedure: LYSIS, ADHESIONS,  ERAS low;  Surgeon: CED Haley MD;  Location: [facility] OR 2ND FLR;  Service: Colon and Rectal;  Laterality: N/A;    LYSIS OF ADHESIONS N/A 2/14/2023    Procedure: LYSIS, ADHESIONS;  Surgeon: Mike Joyce MD;  Location: [facility];  Service: General;  Laterality: N/A;    POUCHOSCOPY N/A 4/6/2022    Procedure: ENDOSCOPY, POUCH, SMALL INTESTINE, DIAGNOSTIC;  Surgeon: Dieter Juarez MD;  Location: [facility] ENDO (2ND FLR);  Service: Endoscopy;  Laterality: N/A;    REPAIR, HERNIA, PARASTOMAL N/A 11/9/2020    Procedure: REPAIR, HERNIA, PARASTOMAL;  Surgeon: CED Haley MD;  Location: [facility] OR 2ND FLR;  Service: Colon and Rectal;  Laterality: N/A;    TRANSURETHRAL RESECTION OF PROSTATE (TURP) WITHOUT USE OF LASER N/A 1/23/2019    Procedure: TURP, WITHOUT USING LASER BIPOLAR;  Surgeon: Catarino Mota MD;  Location: [facility] OR 1ST FLR;  Service: Urology;  Laterality: N/A;  1.5 HOURS     Social History     Socioeconomic History    Marital status:     Number of children: 1   Occupational History    Occupation:  x 44 years     Comment: Retired    Occupation: [place]    Tobacco Use    Smoking status: Never    Smokeless tobacco: Never   Substance and Sexual Activity    Alcohol use: No    Drug use: No    Sexual activity: Not Currently     Partners: Female     Social Determinants of Health     Financial Resource Strain: Low Risk  (11/27/2023)    Overall Financial Resource Strain (CARDIA)     Difficulty of  Paying Living Expenses: Not hard at all   Food Insecurity: No Food Insecurity (11/27/2023)    Hunger Vital Sign     Worried About Running Out of Food in the Last Year: Never true     Ran Out of Food in the Last Year: Never true   Transportation Needs: No Transportation Needs (11/27/2023)    PRAPARE - Transportation     Lack of Transportation (Medical): No     Lack of Transportation (Non-Medical): No   Physical Activity: Sufficiently Active (5/28/2023)    Exercise Vital Sign     Days of Exercise per Week: 7 days     Minutes of Exercise per Session: 60 min   Stress: No Stress Concern Present (11/27/2023)    [employer] [place] [employer] - Occupational Stress Questionnaire     Feeling of Stress : Only a little   Social Connections: Moderately Integrated (11/27/2023)    Social Connection and Isolation Panel [NHANES]     Frequency of Communication with Friends and Family: More than three times a week     Frequency of Social Gatherings with Friends and Family: More than three times a week     Attends Gnosticism Services: 1 to 4 times per year     Active Member of Clubs or Organizations: Yes     Attends Club or Organization Meetings: 1 to 4 times per year     Marital Status:    Housing Stability: Low Risk  (11/27/2023)    Housing Stability Vital Sign     Unable to Pay for Housing in the Last Year: No     Number of Places Lived in the Last Year: 1     Unstable Housing in the Last Year: No     Family History   Problem Relation Age of Onset    Cancer Father     Diabetes Father     Dementia Mother     Melanoma Neg Hx     Hypertension Neg Hx     Arthritis Neg Hx         Allergies  Review of patient's allergies indicates:   Allergen Reactions    Crestor [rosuvastatin]     Lipitor [atorvastatin]        Review of Systems   Review of Systems   Constitutional: Negative for fever.   HENT:  Negative for nosebleeds.    Eyes:  Negative for visual disturbance.   Cardiovascular:  Negative for chest pain, claudication,  dyspnea on exertion, palpitations and syncope.   Respiratory:  Negative for cough, hemoptysis and wheezing.    Endocrine: Negative for cold intolerance, heat intolerance, polyphagia and polyuria.   Hematologic/Lymphatic: Negative for bleeding problem.   Skin:  Negative for rash.   Musculoskeletal:  Negative for myalgias.   Gastrointestinal:  Negative for hematemesis, hematochezia, nausea and vomiting.   Genitourinary:  Negative for dysuria.   Neurological:  Negative for focal weakness and sensory change.   Psychiatric/Behavioral:  Negative for altered mental status.        Physical Exam  Vitals:    02/01/24 1444   BP: 105/71   Pulse: 87       Wt Readings from Last 1 Encounters:   02/01/24 93.8 kg (206 lb 10.9 oz)     Physical Exam  HENT:      Head: Normocephalic and atraumatic.      Mouth/Throat:      Mouth: Mucous membranes are moist.   Eyes:      Extraocular Movements: Extraocular movements intact.      Pupils: Pupils are equal, round, and reactive to light.   Neck:      Vascular: No carotid bruit or JVD.   Cardiovascular:      Rate and Rhythm: Normal rate and regular rhythm.      Pulses: Normal pulses and intact distal pulses.      Heart sounds: Normal heart sounds. No murmur heard.     No friction rub. No gallop.   Pulmonary:      Effort: Pulmonary effort is normal.      Breath sounds: Normal breath sounds.   Abdominal:      Tenderness: There is no abdominal tenderness. There is no guarding or rebound.   Musculoskeletal:      Right lower leg: No edema.      Left lower leg: No edema.   Skin:     General: Skin is warm and dry.      Capillary Refill: Capillary refill takes less than 2 seconds.   Neurological:      General: No focal deficit present.      Mental Status: He is alert and oriented to person, place, and time.   Psychiatric:         Mood and Affect: Mood normal.         Labs  Lab Visit on 01/10/2024   Component Date Value Ref Range Status    PSA Diagnostic 01/10/2024 1.5  0.00 - 4.00 ng/mL Final     Comment: The testing method is a chemiluminescent microparticle immunoassay   manufactured by [employer] and performed on the    or   [employer] system. Values obtained with different assay manufacturers   for   methods may be different and cannot be used interchangeably.  PSA Expected levels:  Hormonal Therapy: <0.05 ng/ml  Prostatectomy: <0.01 ng/ml  Radiation Therapy: <1.00 ng/ml     Lab Visit on 01/05/2024   Component Date Value Ref Range Status    Specimen UA 01/05/2024 Urine, Clean Catch   Final    Color, UA 01/05/2024 Colorless (A)  Yellow, Straw, Stacy Final    Appearance, UA 01/05/2024 Clear  Clear Final    pH, UA 01/05/2024 6.0  5.0 - 8.0 Final    Specific Gravity, UA 01/05/2024 1.010  1.005 - 1.030 Final    Protein, UA 01/05/2024 Negative  Negative Final    Comment: Recommend a 24 hour urine protein or a urine   protein/creatinine ratio if globulin induced proteinuria is  clinically suspected.      Glucose, UA 01/05/2024 1+ (A)  Negative Final    Ketones, UA 01/05/2024 Negative  Negative Final    Bilirubin (UA) 01/05/2024 Negative  Negative Final    Occult Blood UA 01/05/2024 Negative  Negative Final    Nitrite, UA 01/05/2024 Negative  Negative Final    Urobilinogen, UA 01/05/2024 Negative  Negative EU/dL Final    Leukocytes, UA 01/05/2024 Negative  Negative Final    Specimen UA 01/05/2024 Urine, Clean Catch   Final    Color, UA 01/05/2024 Colorless (A)  Yellow, Straw, Stacy Final    Appearance, UA 01/05/2024 Clear  Clear Final    pH, UA 01/05/2024 6.0  5.0 - 8.0 Final    Specific Gravity, UA 01/05/2024 1.010  1.005 - 1.030 Final    Protein, UA 01/05/2024 Negative  Negative Final    Comment: Recommend a 24 hour urine protein or a urine   protein/creatinine ratio if globulin induced proteinuria is  clinically suspected.      Glucose, UA 01/05/2024 1+ (A)  Negative Final    Ketones, UA 01/05/2024 Negative  Negative Final    Bilirubin (UA) 01/05/2024 Negative  Negative Final    Occult Blood  UA 01/05/2024 Negative  Negative Final    Nitrite, UA 01/05/2024 Negative  Negative Final    Urobilinogen, UA 01/05/2024 Negative  Negative EU/dL Final    Leukocytes, UA 01/05/2024 Negative  Negative Final   Lab Visit on 01/05/2024   Component Date Value Ref Range Status    WBC 01/05/2024 7.55  3.90 - 12.70 K/uL Final    RBC 01/05/2024 4.16 (L)  4.60 - 6.20 M/uL Final    Hemoglobin 01/05/2024 12.3 (L)  14.0 - 18.0 g/dL Final    Hematocrit 01/05/2024 38.7 (L)  40.0 - 54.0 % Final    MCV 01/05/2024 93  82 - 98 fL Final    MCH 01/05/2024 29.6  27.0 - 31.0 pg Final    MCHC 01/05/2024 31.8 (L)  32.0 - 36.0 g/dL Final    RDW 01/05/2024 15.1 (H)  11.5 - 14.5 % Final    Platelets 01/05/2024 178  150 - 450 K/uL Final    MPV 01/05/2024 10.3  9.2 - 12.9 fL Final    Immature Granulocytes 01/05/2024 0.3  0.0 - 0.5 % Final    Gran # (ANC) 01/05/2024 5.4  1.8 - 7.7 K/uL Final    Immature Grans (Abs) 01/05/2024 0.02  0.00 - 0.04 K/uL Final    Comment: Mild elevation in immature granulocytes is non specific and   can be seen in a variety of conditions including stress response,   acute inflammation, trauma and pregnancy. Correlation with other   laboratory and clinical findings is essential.      Lymph # 01/05/2024 1.6  1.0 - 4.8 K/uL Final    Mono # 01/05/2024 0.4  0.3 - 1.0 K/uL Final    Eos # 01/05/2024 0.1  0.0 - 0.5 K/uL Final    Baso # 01/05/2024 0.03  0.00 - 0.20 K/uL Final    nRBC 01/05/2024 0  0 /100 WBC Final    Gran % 01/05/2024 71.4  38.0 - 73.0 % Final    Lymph % 01/05/2024 20.8  18.0 - 48.0 % Final    Mono % 01/05/2024 5.2  4.0 - 15.0 % Final    Eosinophil % 01/05/2024 1.9  0.0 - 8.0 % Final    Basophil % 01/05/2024 0.4  0.0 - 1.9 % Final    Differential Method 01/05/2024 Automated   Final    Sodium 01/05/2024 145  136 - 145 mmol/L Final    Potassium 01/05/2024 4.4  3.5 - 5.1 mmol/L Final    Chloride 01/05/2024 111 (H)  95 - 110 mmol/L Final    CO2 01/05/2024 22 (L)  23 - 29 mmol/L Final    Glucose 01/05/2024 145 (H)   70 - 110 mg/dL Final    BUN 01/05/2024 31 (H)  8 - 23 mg/dL Final    Creatinine 01/05/2024 2.3 (H)  0.5 - 1.4 mg/dL Final    Calcium 01/05/2024 9.0  8.7 - 10.5 mg/dL Final    Total Protein 01/05/2024 7.1  6.0 - 8.4 g/dL Final    Albumin 01/05/2024 3.5  3.5 - 5.2 g/dL Final    Total Bilirubin 01/05/2024 0.8  0.1 - 1.0 mg/dL Final    Comment: For infants and newborns, interpretation of results should be based  on gestational age, weight and in agreement with clinical  observations.    Premature Infant recommended reference ranges:  Up to 24 hours.............<8.0 mg/dL  Up to 48 hours............<12.0 mg/dL  3-5 days..................<15.0 mg/dL  6-29 days.................<15.0 mg/dL      Alkaline Phosphatase 01/05/2024 123  55 - 135 U/L Final    AST 01/05/2024 24  10 - 40 U/L Final    ALT 01/05/2024 21  10 - 44 U/L Final    eGFR 01/05/2024 29.1 (A)  >60 mL/min/1.73 m^2 Final    Anion Gap 01/05/2024 12  8 - 16 mmol/L Final    Phosphorus 01/05/2024 3.4  2.7 - 4.5 mg/dL Final    PTH, Intact 01/05/2024 143.5 (H)  9.0 - 77.0 pg/mL Final    Hemoglobin A1C 01/05/2024 6.6 (H)  4.0 - 5.6 % Final    Comment: ADA Screening Guidelines:  5.7-6.4%  Consistent with prediabetes  >or=6.5%  Consistent with diabetes    High levels of fetal hemoglobin interfere with the HbA1C  assay. Heterozygous hemoglobin variants (HbS, HgC, etc)do  not significantly interfere with this assay.   However, presence of multiple variants may affect accuracy.      Estimated Avg Glucose 01/05/2024 143 (H)  68 - 131 mg/dL Final   Admission on 12/07/2023, Discharged on 12/07/2023   Component Date Value Ref Range Status    WBC 12/07/2023 8.79  3.90 - 12.70 K/uL Final    RBC 12/07/2023 4.38 (L)  4.60 - 6.20 M/uL Final    Hemoglobin 12/07/2023 12.6 (L)  14.0 - 18.0 g/dL Final    Hematocrit 12/07/2023 39.7 (L)  40.0 - 54.0 % Final    MCV 12/07/2023 91  82 - 98 fL Final    MCH 12/07/2023 28.8  27.0 - 31.0 pg Final    MCHC 12/07/2023 31.7 (L)  32.0 - 36.0 g/dL  Final    RDW 12/07/2023 14.5  11.5 - 14.5 % Final    Platelets 12/07/2023 194  150 - 450 K/uL Final    MPV 12/07/2023 10.3  9.2 - 12.9 fL Final    Immature Granulocytes 12/07/2023 0.6 (H)  0.0 - 0.5 % Final    Gran # (ANC) 12/07/2023 7.0  1.8 - 7.7 K/uL Final    Immature Grans (Abs) 12/07/2023 0.05 (H)  0.00 - 0.04 K/uL Final    Comment: Mild elevation in immature granulocytes is non specific and   can be seen in a variety of conditions including stress response,   acute inflammation, trauma and pregnancy. Correlation with other   laboratory and clinical findings is essential.      Lymph # 12/07/2023 1.2  1.0 - 4.8 K/uL Final    Mono # 12/07/2023 0.4  0.3 - 1.0 K/uL Final    Eos # 12/07/2023 0.1  0.0 - 0.5 K/uL Final    Baso # 12/07/2023 0.02  0.00 - 0.20 K/uL Final    nRBC 12/07/2023 0  0 /100 WBC Final    Gran % 12/07/2023 80.1 (H)  38.0 - 73.0 % Final    Lymph % 12/07/2023 13.5 (L)  18.0 - 48.0 % Final    Mono % 12/07/2023 4.7  4.0 - 15.0 % Final    Eosinophil % 12/07/2023 0.9  0.0 - 8.0 % Final    Basophil % 12/07/2023 0.2  0.0 - 1.9 % Final    Differential Method 12/07/2023 Automated   Final    Sodium 12/07/2023 141  136 - 145 mmol/L Final    Potassium 12/07/2023 4.4  3.5 - 5.1 mmol/L Final    Chloride 12/07/2023 107  95 - 110 mmol/L Final    CO2 12/07/2023 20 (L)  23 - 29 mmol/L Final    Glucose 12/07/2023 181 (H)  70 - 110 mg/dL Final    BUN 12/07/2023 44 (H)  8 - 23 mg/dL Final    Creatinine 12/07/2023 2.6 (H)  0.5 - 1.4 mg/dL Final    Calcium 12/07/2023 9.4  8.7 - 10.5 mg/dL Final    Total Protein 12/07/2023 7.3  6.0 - 8.4 g/dL Final    Albumin 12/07/2023 3.6  3.5 - 5.2 g/dL Final    Total Bilirubin 12/07/2023 0.5  0.1 - 1.0 mg/dL Final    Comment: For infants and newborns, interpretation of results should be based  on gestational age, weight and in agreement with clinical  observations.    Premature Infant recommended reference ranges:  Up to 24 hours.............<8.0 mg/dL  Up to 48 hours............<12.0  mg/dL  3-5 days..................<15.0 mg/dL  6-29 days.................<15.0 mg/dL      Alkaline Phosphatase 12/07/2023 121  55 - 135 U/L Final    AST 12/07/2023 23  10 - 40 U/L Final    ALT 12/07/2023 22  10 - 44 U/L Final    eGFR 12/07/2023 25.1 (A)  >60 mL/min/1.73 m^2 Final    Anion Gap 12/07/2023 14  8 - 16 mmol/L Final    Troponin I 12/07/2023 0.014  0.000 - 0.026 ng/mL Final    Comment: The reference interval for Troponin I represents the 99th percentile   cutoff   for our facility and is consistent with 3rd generation assay   performance.      CPK 12/07/2023 49  20 - 200 U/L Final    Lipase 12/07/2023 101 (H)  4 - 60 U/L Final   Clinical Support on 11/30/2023   Component Date Value Ref Range Status    ICD Shock 11/30/2023 No   Final    Device Type 11/30/2023 Loop   Final    AF [place] % 11/30/2023 5.2   Final   Admission on 11/26/2023, Discharged on 11/28/2023   Component Date Value Ref Range Status    WBC 11/26/2023 12.19  3.90 - 12.70 K/uL Final    RBC 11/26/2023 4.72  4.60 - 6.20 M/uL Final    Hemoglobin 11/26/2023 13.5 (L)  14.0 - 18.0 g/dL Final    Hematocrit 11/26/2023 42.1  40.0 - 54.0 % Final    MCV 11/26/2023 89  82 - 98 fL Final    MCH 11/26/2023 28.6  27.0 - 31.0 pg Final    MCHC 11/26/2023 32.1  32.0 - 36.0 g/dL Final    RDW 11/26/2023 14.5  11.5 - 14.5 % Final    Platelets 11/26/2023 256  150 - 450 K/uL Final    MPV 11/26/2023 10.8  9.2 - 12.9 fL Final    Immature Granulocytes 11/26/2023 0.3  0.0 - 0.5 % Final    Gran # (ANC) 11/26/2023 8.4 (H)  1.8 - 7.7 K/uL Final    Immature Grans (Abs) 11/26/2023 0.04  0.00 - 0.04 K/uL Final    Comment: Mild elevation in immature granulocytes is non specific and   can be seen in a variety of conditions including stress response,   acute inflammation, trauma and pregnancy. Correlation with other   laboratory and clinical findings is essential.      Lymph # 11/26/2023 2.9  1.0 - 4.8 K/uL Final    Mono # 11/26/2023 0.7  0.3 - 1.0 K/uL Final    Eos # 11/26/2023  0.1  0.0 - 0.5 K/uL Final    Baso # 11/26/2023 0.05  0.00 - 0.20 K/uL Final    nRBC 11/26/2023 0  0 /100 WBC Final    Gran % 11/26/2023 69.1  38.0 - 73.0 % Final    Lymph % 11/26/2023 23.9  18.0 - 48.0 % Final    Mono % 11/26/2023 5.4  4.0 - 15.0 % Final    Eosinophil % 11/26/2023 0.9  0.0 - 8.0 % Final    Basophil % 11/26/2023 0.4  0.0 - 1.9 % Final    Differential Method 11/26/2023 Automated   Final    Sodium 11/26/2023 140  136 - 145 mmol/L Final    Potassium 11/26/2023 2.9 (L)  3.5 - 5.1 mmol/L Final    Chloride 11/26/2023 105  95 - 110 mmol/L Final    CO2 11/26/2023 19 (L)  23 - 29 mmol/L Final    Glucose 11/26/2023 119 (H)  70 - 110 mg/dL Final    BUN 11/26/2023 49 (H)  8 - 23 mg/dL Final    Creatinine 11/26/2023 3.5 (H)  0.5 - 1.4 mg/dL Final    Calcium 11/26/2023 9.4  8.7 - 10.5 mg/dL Final    Total Protein 11/26/2023 8.1  6.0 - 8.4 g/dL Final    Albumin 11/26/2023 3.9  3.5 - 5.2 g/dL Final    Total Bilirubin 11/26/2023 0.9  0.1 - 1.0 mg/dL Final    Comment: For infants and newborns, interpretation of results should be based  on gestational age, weight and in agreement with clinical  observations.    Premature Infant recommended reference ranges:  Up to 24 hours.............<8.0 mg/dL  Up to 48 hours............<12.0 mg/dL  3-5 days..................<15.0 mg/dL  6-29 days.................<15.0 mg/dL      Alkaline Phosphatase 11/26/2023 128  55 - 135 U/L Final    AST 11/26/2023 30  10 - 40 U/L Final    ALT 11/26/2023 26  10 - 44 U/L Final    eGFR 11/26/2023 17.6 (A)  >60 mL/min/1.73 m^2 Final    Anion Gap 11/26/2023 16  8 - 16 mmol/L Final    Troponin I 11/26/2023 0.076 (H)  0.000 - 0.026 ng/mL Final    Comment: The reference interval for Troponin I represents the 99th percentile   cutoff   for our facility and is consistent with 3rd generation assay   performance.      POCT Glucose 11/26/2023 100  70 - 110 mg/dL Final    Blood Culture, Routine 11/26/2023 No growth after 5 days.   Final    Blood Culture,  Routine 11/26/2023 No growth after 5 days.   Final    Specimen UA 11/26/2023 Urine, Clean Catch   Final    Color, UA 11/26/2023 Colorless (A)  Yellow, Straw, Stacy Final    Appearance, UA 11/26/2023 Clear  Clear Final    pH, UA 11/26/2023 7.0  5.0 - 8.0 Final    Specific Gravity, UA 11/26/2023 1.005  1.005 - 1.030 Final    Protein, UA 11/26/2023 Negative  Negative Final    Comment: Recommend a 24 hour urine protein or a urine   protein/creatinine ratio if globulin induced proteinuria is  clinically suspected.      Glucose, UA 11/26/2023 1+ (A)  Negative Final    Ketones, UA 11/26/2023 Negative  Negative Final    Bilirubin (UA) 11/26/2023 Negative  Negative Final    Occult Blood UA 11/26/2023 Negative  Negative Final    Nitrite, UA 11/26/2023 Negative  Negative Final    Urobilinogen, UA 11/26/2023 Negative  Negative EU/dL Final    Leukocytes, UA 11/26/2023 Negative  Negative Final    POC Lactate 11/26/2023 2.6  mmol/L Final    POC Temp 11/26/2023 37.0  C Final    Specimen source 11/26/2023 Venous   Final    FiO2 11/26/2023 21.0  % Final    O2DEVICE 11/26/2023 Room Air   Final    Mode 11/26/2023 None   Final    Cortisol 11/26/2023 18.50  ug/dL Final    Comment: Cortisol Reference Range:  Before 10:00 am: 4.46-22.7 ug/dL  After 5:00 pm:    1.7-14.1 ug/dL      Troponin I 11/26/2023 0.061 (H)  0.000 - 0.026 ng/mL Final    Comment: The reference interval for Troponin I represents the 99th percentile   cutoff   for our facility and is consistent with 3rd generation assay   performance.      POC Lactate 11/26/2023 1.4  mmol/L Final    POC Temp 11/26/2023 37.0  C Final    Specimen source 11/26/2023 Venous   Final    FiO2 11/26/2023 21.0  % Final    O2DEVICE 11/26/2023 Room Air   Final    Mode 11/26/2023 None   Final    POCT Glucose 11/26/2023 136 (H)  70 - 110 mg/dL Final    Sodium 11/27/2023 139  136 - 145 mmol/L Final    Potassium 11/27/2023 4.0  3.5 - 5.1 mmol/L Final    Chloride 11/27/2023 107  95 - 110 mmol/L Final     CO2 11/27/2023 19 (L)  23 - 29 mmol/L Final    Glucose 11/27/2023 128 (H)  70 - 110 mg/dL Final    BUN 11/27/2023 52 (H)  8 - 23 mg/dL Final    Creatinine 11/27/2023 3.5 (H)  0.5 - 1.4 mg/dL Final    Calcium 11/27/2023 8.8  8.7 - 10.5 mg/dL Final    Total Protein 11/27/2023 7.1  6.0 - 8.4 g/dL Final    Albumin 11/27/2023 3.4 (L)  3.5 - 5.2 g/dL Final    Total Bilirubin 11/27/2023 0.7  0.1 - 1.0 mg/dL Final    Comment: For infants and newborns, interpretation of results should be based  on gestational age, weight and in agreement with clinical  observations.    Premature Infant recommended reference ranges:  Up to 24 hours.............<8.0 mg/dL  Up to 48 hours............<12.0 mg/dL  3-5 days..................<15.0 mg/dL  6-29 days.................<15.0 mg/dL      Alkaline Phosphatase 11/27/2023 114  55 - 135 U/L Final    AST 11/27/2023 27  10 - 40 U/L Final    ALT 11/27/2023 23  10 - 44 U/L Final    eGFR 11/27/2023 17.6 (A)  >60 mL/min/1.73 m^2 Final    Anion Gap 11/27/2023 13  8 - 16 mmol/L Final    WBC 11/27/2023 9.36  3.90 - 12.70 K/uL Final    RBC 11/27/2023 4.19 (L)  4.60 - 6.20 M/uL Final    Hemoglobin 11/27/2023 12.1 (L)  14.0 - 18.0 g/dL Final    Hematocrit 11/27/2023 37.8 (L)  40.0 - 54.0 % Final    MCV 11/27/2023 90  82 - 98 fL Final    MCH 11/27/2023 28.9  27.0 - 31.0 pg Final    MCHC 11/27/2023 32.0  32.0 - 36.0 g/dL Final    RDW 11/27/2023 14.6 (H)  11.5 - 14.5 % Final    Platelets 11/27/2023 196  150 - 450 K/uL Final    MPV 11/27/2023 11.4  9.2 - 12.9 fL Final    Immature Granulocytes 11/27/2023 0.4  0.0 - 0.5 % Final    Gran # (ANC) 11/27/2023 7.9 (H)  1.8 - 7.7 K/uL Final    Immature Grans (Abs) 11/27/2023 0.04  0.00 - 0.04 K/uL Final    Comment: Mild elevation in immature granulocytes is non specific and   can be seen in a variety of conditions including stress response,   acute inflammation, trauma and pregnancy. Correlation with other   laboratory and clinical findings is essential.      Lymph #  11/27/2023 1.2  1.0 - 4.8 K/uL Final    Mono # 11/27/2023 0.3  0.3 - 1.0 K/uL Final    Eos # 11/27/2023 0.0  0.0 - 0.5 K/uL Final    Baso # 11/27/2023 0.02  0.00 - 0.20 K/uL Final    nRBC 11/27/2023 0  0 /100 WBC Final    Gran % 11/27/2023 84.1 (H)  38.0 - 73.0 % Final    Lymph % 11/27/2023 12.4 (L)  18.0 - 48.0 % Final    Mono % 11/27/2023 2.9 (L)  4.0 - 15.0 % Final    Eosinophil % 11/27/2023 0.0  0.0 - 8.0 % Final    Basophil % 11/27/2023 0.2  0.0 - 1.9 % Final    Differential Method 11/27/2023 Automated   Final    Cholesterol 11/27/2023 187  120 - 199 mg/dL Final    Comment: The National Cholesterol Education Program (NCEP) has set the  following guidelines (reference ranges) for Cholesterol:  Optimal.....................<200 mg/dL  Borderline High.............200-239 mg/dL  High........................> or = 240 mg/dL      Triglycerides 11/27/2023 133  30 - 150 mg/dL Final    Comment: The National Cholesterol Education Program (NCEP) has set the  following guidelines (reference values) for triglycerides:  Normal......................<150 mg/dL  Borderline High.............150-199 mg/dL  High........................200-499 mg/dL      HDL 11/27/2023 36 (L)  40 - 75 mg/dL Final    Comment: The National Cholesterol Education Program (NCEP) has set the  following guidelines (reference values) for HDL Cholesterol:  Low...............<40 mg/dL  Optimal...........>60 mg/dL      LDL Cholesterol 11/27/2023 124.4  63.0 - 159.0 mg/dL Final    Comment: The National Cholesterol Education Program (NCEP) has set the  following guidelines (reference values) for LDL Cholesterol:  Optimal.......................<130 mg/dL  Borderline High...............130-159 mg/dL  High..........................160-189 mg/dL  Very High.....................>190 mg/dL      HDL/Cholesterol Ratio 11/27/2023 19.3 (L)  20.0 - 50.0 % Final    Total Cholesterol/HDL Ratio 11/27/2023 5.2 (H)  2.0 - 5.0 Final    Non-HDL Cholesterol 11/27/2023 151  mg/dL  Final    Comment: Risk category and Non-HDL cholesterol goals:  Coronary heart disease (CHD)or equivalent (10-year risk of CHD >20%):  Non-HDL cholesterol goal     <130 mg/dL  Two or more CHD risk factors and 10-year risk of CHD <= 20%:  Non-HDL cholesterol goal     <160 mg/dL  0 to 1 CHD risk factor:  Non-HDL cholesterol goal     <190 mg/dL      POCT Glucose 11/27/2023 81  70 - 110 mg/dL Final    POCT Glucose 11/27/2023 103  70 - 110 mg/dL Final    POCT Glucose 11/27/2023 145 (H)  70 - 110 mg/dL Final    POCT Glucose 11/27/2023 129 (H)  70 - 110 mg/dL Final    Sodium 11/28/2023 139  136 - 145 mmol/L Final    Potassium 11/28/2023 3.6  3.5 - 5.1 mmol/L Final    Chloride 11/28/2023 108  95 - 110 mmol/L Final    CO2 11/28/2023 18 (L)  23 - 29 mmol/L Final    Glucose 11/28/2023 123 (H)  70 - 110 mg/dL Final    BUN 11/28/2023 61 (H)  8 - 23 mg/dL Final    Creatinine 11/28/2023 3.5 (H)  0.5 - 1.4 mg/dL Final    Calcium 11/28/2023 8.3 (L)  8.7 - 10.5 mg/dL Final    Total Protein 11/28/2023 6.6  6.0 - 8.4 g/dL Final    Albumin 11/28/2023 3.2 (L)  3.5 - 5.2 g/dL Final    Total Bilirubin 11/28/2023 0.4  0.1 - 1.0 mg/dL Final    Comment: For infants and newborns, interpretation of results should be based  on gestational age, weight and in agreement with clinical  observations.    Premature Infant recommended reference ranges:  Up to 24 hours.............<8.0 mg/dL  Up to 48 hours............<12.0 mg/dL  3-5 days..................<15.0 mg/dL  6-29 days.................<15.0 mg/dL      Alkaline Phosphatase 11/28/2023 110  55 - 135 U/L Final    AST 11/28/2023 24  10 - 40 U/L Final    ALT 11/28/2023 22  10 - 44 U/L Final    eGFR 11/28/2023 17.6 (A)  >60 mL/min/1.73 m^2 Final    Anion Gap 11/28/2023 13  8 - 16 mmol/L Final    WBC 11/28/2023 7.45  3.90 - 12.70 K/uL Final    RBC 11/28/2023 3.91 (L)  4.60 - 6.20 M/uL Final    Hemoglobin 11/28/2023 11.3 (L)  14.0 - 18.0 g/dL Final    Hematocrit 11/28/2023 35.2 (L)  40.0 - 54.0 % Final     MCV 11/28/2023 90  82 - 98 fL Final    MCH 11/28/2023 28.9  27.0 - 31.0 pg Final    MCHC 11/28/2023 32.1  32.0 - 36.0 g/dL Final    RDW 11/28/2023 14.7 (H)  11.5 - 14.5 % Final    Platelets 11/28/2023 172  150 - 450 K/uL Final    MPV 11/28/2023 11.0  9.2 - 12.9 fL Final    Immature Granulocytes 11/28/2023 0.1  0.0 - 0.5 % Final    Gran # (ANC) 11/28/2023 4.8  1.8 - 7.7 K/uL Final    Immature Grans (Abs) 11/28/2023 0.01  0.00 - 0.04 K/uL Final    Comment: Mild elevation in immature granulocytes is non specific and   can be seen in a variety of conditions including stress response,   acute inflammation, trauma and pregnancy. Correlation with other   laboratory and clinical findings is essential.      Lymph # 11/28/2023 2.1  1.0 - 4.8 K/uL Final    Mono # 11/28/2023 0.4  0.3 - 1.0 K/uL Final    Eos # 11/28/2023 0.1  0.0 - 0.5 K/uL Final    Baso # 11/28/2023 0.03  0.00 - 0.20 K/uL Final    nRBC 11/28/2023 0  0 /100 WBC Final    Gran % 11/28/2023 65.0  38.0 - 73.0 % Final    Lymph % 11/28/2023 27.7  18.0 - 48.0 % Final    Mono % 11/28/2023 5.6  4.0 - 15.0 % Final    Eosinophil % 11/28/2023 1.2  0.0 - 8.0 % Final    Basophil % 11/28/2023 0.4  0.0 - 1.9 % Final    Differential Method 11/28/2023 Automated   Final    POCT Glucose 11/28/2023 103  70 - 110 mg/dL Final   Clinical Support on 11/07/2023   Component Date Value Ref Range Status    ICD Shock 11/07/2023 No   Final    Device Type 11/07/2023 Loop   Final    AF [place] % 11/07/2023 2.5   Final   Lab Visit on 11/03/2023   Component Date Value Ref Range Status    Magnesium 11/03/2023 1.6  1.6 - 2.6 mg/dL Final    WBC 11/03/2023 7.48  3.90 - 12.70 K/uL Final    RBC 11/03/2023 3.74 (L)  4.60 - 6.20 M/uL Final    Hemoglobin 11/03/2023 10.9 (L)  14.0 - 18.0 g/dL Final    Hematocrit 11/03/2023 35.1 (L)  40.0 - 54.0 % Final    MCV 11/03/2023 94  82 - 98 fL Final    MCH 11/03/2023 29.1  27.0 - 31.0 pg Final    MCHC 11/03/2023 31.1 (L)  32.0 - 36.0 g/dL Final    RDW 11/03/2023  14.0  11.5 - 14.5 % Final    Platelets 11/03/2023 266  150 - 450 K/uL Final    MPV 11/03/2023 10.4  9.2 - 12.9 fL Final    Immature Granulocytes 11/03/2023 0.7 (H)  0.0 - 0.5 % Final    Gran # (ANC) 11/03/2023 5.4  1.8 - 7.7 K/uL Final    Immature Grans (Abs) 11/03/2023 0.05 (H)  0.00 - 0.04 K/uL Final    Comment: Mild elevation in immature granulocytes is non specific and   can be seen in a variety of conditions including stress response,   acute inflammation, trauma and pregnancy. Correlation with other   laboratory and clinical findings is essential.      Lymph # 11/03/2023 1.5  1.0 - 4.8 K/uL Final    Mono # 11/03/2023 0.4  0.3 - 1.0 K/uL Final    Eos # 11/03/2023 0.1  0.0 - 0.5 K/uL Final    Baso # 11/03/2023 0.04  0.00 - 0.20 K/uL Final    nRBC 11/03/2023 0  0 /100 WBC Final    Gran % 11/03/2023 72.4  38.0 - 73.0 % Final    Lymph % 11/03/2023 19.8  18.0 - 48.0 % Final    Mono % 11/03/2023 5.1  4.0 - 15.0 % Final    Eosinophil % 11/03/2023 1.5  0.0 - 8.0 % Final    Basophil % 11/03/2023 0.5  0.0 - 1.9 % Final    Differential Method 11/03/2023 Automated   Final    Glucose 11/03/2023 163 (H)  70 - 110 mg/dL Final    Sodium 11/03/2023 140  136 - 145 mmol/L Final    Potassium 11/03/2023 4.2  3.5 - 5.1 mmol/L Final    Chloride 11/03/2023 110  95 - 110 mmol/L Final    CO2 11/03/2023 23  23 - 29 mmol/L Final    BUN 11/03/2023 38 (H)  8 - 23 mg/dL Final    Calcium 11/03/2023 8.7  8.7 - 10.5 mg/dL Final    Creatinine 11/03/2023 2.2 (H)  0.5 - 1.4 mg/dL Final    Albumin 11/03/2023 3.0 (L)  3.5 - 5.2 g/dL Final    Phosphorus 11/03/2023 3.7  2.7 - 4.5 mg/dL Final    eGFR 11/03/2023 30.7 (A)  >60 mL/min/1.73 m^2 Final    Anion Gap 11/03/2023 7 (L)  8 - 16 mmol/L Final   Lab Visit on 10/26/2023   Component Date Value Ref Range Status    WBC 10/26/2023 8.91  3.90 - 12.70 K/uL Final    RBC 10/26/2023 3.92 (L)  4.60 - 6.20 M/uL Final    Hemoglobin 10/26/2023 11.5 (L)  14.0 - 18.0 g/dL Final    Hematocrit 10/26/2023 36.6 (L)   40.0 - 54.0 % Final    MCV 10/26/2023 93  82 - 98 fL Final    MCH 10/26/2023 29.3  27.0 - 31.0 pg Final    MCHC 10/26/2023 31.4 (L)  32.0 - 36.0 g/dL Final    RDW 10/26/2023 13.8  11.5 - 14.5 % Final    Platelets 10/26/2023 232  150 - 450 K/uL Final    MPV 10/26/2023 10.1  9.2 - 12.9 fL Final    Immature Granulocytes 10/26/2023 0.4  0.0 - 0.5 % Final    Gran # (ANC) 10/26/2023 6.7  1.8 - 7.7 K/uL Final    Immature Grans (Abs) 10/26/2023 0.04  0.00 - 0.04 K/uL Final    Comment: Mild elevation in immature granulocytes is non specific and   can be seen in a variety of conditions including stress response,   acute inflammation, trauma and pregnancy. Correlation with other   laboratory and clinical findings is essential.      Lymph # 10/26/2023 1.5  1.0 - 4.8 K/uL Final    Mono # 10/26/2023 0.5  0.3 - 1.0 K/uL Final    Eos # 10/26/2023 0.1  0.0 - 0.5 K/uL Final    Baso # 10/26/2023 0.04  0.00 - 0.20 K/uL Final    nRBC 10/26/2023 0  0 /100 WBC Final    Gran % 10/26/2023 75.4 (H)  38.0 - 73.0 % Final    Lymph % 10/26/2023 17.3 (L)  18.0 - 48.0 % Final    Mono % 10/26/2023 5.5  4.0 - 15.0 % Final    Eosinophil % 10/26/2023 1.0  0.0 - 8.0 % Final    Basophil % 10/26/2023 0.4  0.0 - 1.9 % Final    Differential Method 10/26/2023 Automated   Final    Sodium 10/26/2023 143  136 - 145 mmol/L Final    Potassium 10/26/2023 3.9  3.5 - 5.1 mmol/L Final    Chloride 10/26/2023 104  95 - 110 mmol/L Final    CO2 10/26/2023 22 (L)  23 - 29 mmol/L Final    Glucose 10/26/2023 167 (H)  70 - 110 mg/dL Final    BUN 10/26/2023 40 (H)  8 - 23 mg/dL Final    Creatinine 10/26/2023 3.6 (H)  0.5 - 1.4 mg/dL Final    Calcium 10/26/2023 9.1  8.7 - 10.5 mg/dL Final    Total Protein 10/26/2023 7.5  6.0 - 8.4 g/dL Final    Albumin 10/26/2023 3.3 (L)  3.5 - 5.2 g/dL Final    Total Bilirubin 10/26/2023 0.5  0.1 - 1.0 mg/dL Final    Comment: For infants and newborns, interpretation of results should be based  on gestational age, weight and in agreement with  clinical  observations.    Premature Infant recommended reference ranges:  Up to 24 hours.............<8.0 mg/dL  Up to 48 hours............<12.0 mg/dL  3-5 days..................<15.0 mg/dL  6-29 days.................<15.0 mg/dL      Alkaline Phosphatase 10/26/2023 160 (H)  55 - 135 U/L Final    AST 10/26/2023 25  10 - 40 U/L Final    ALT 10/26/2023 24  10 - 44 U/L Final    eGFR 10/26/2023 17.0 (A)  >60 mL/min/1.73 m^2 Final    Anion Gap 10/26/2023 17 (H)  8 - 16 mmol/L Final    Hemoglobin A1C 10/26/2023 6.7 (H)  4.0 - 5.6 % Final    Comment: ADA Screening Guidelines:  5.7-6.4%  Consistent with prediabetes  >or=6.5%  Consistent with diabetes    High levels of fetal hemoglobin interfere with the HbA1C  assay. Heterozygous hemoglobin variants (HbS, HgC, etc)do  not significantly interfere with this assay.   However, presence of multiple variants may affect accuracy.      Estimated Avg Glucose 10/26/2023 146 (H)  68 - 131 mg/dL Final    TSH 10/26/2023 0.957  0.400 - 4.000 uIU/mL Final    Free T4 10/26/2023 0.94  0.71 - 1.51 ng/dL Final    Aldosterone 10/26/2023 34.8  ng/dL Final    Comment: REFERENCE RANGE:    Upright              <= 39.2 ng/dL    Supine               <= 23.2 ng/dL    Test performed at [facility],  [address], [place]  [address] [phone]  Kamille Cole MD, PhD - Medical Director      Testosterone, Total 10/26/2023 29 (L)  304 - 1227 ng/dL Final   Admission on 10/23/2023, Discharged on 10/23/2023   Component Date Value Ref Range Status    WBC 10/23/2023 8.13  3.90 - 12.70 K/uL Final    RBC 10/23/2023 3.89 (L)  4.60 - 6.20 M/uL Final    Hemoglobin 10/23/2023 11.5 (L)  14.0 - 18.0 g/dL Final    Hematocrit 10/23/2023 35.1 (L)  40.0 - 54.0 % Final    MCV 10/23/2023 90  82 - 98 fL Final    MCH 10/23/2023 29.6  27.0 - 31.0 pg Final    MCHC 10/23/2023 32.8  32.0 - 36.0 g/dL Final    RDW 10/23/2023 13.6  11.5 - 14.5 % Final    Platelets 10/23/2023 233  150 - 450 K/uL Final     MPV 10/23/2023 10.8  9.2 - 12.9 fL Final    Immature Granulocytes 10/23/2023 0.4  0.0 - 0.5 % Final    Gran # (ANC) 10/23/2023 6.0  1.8 - 7.7 K/uL Final    Immature Grans (Abs) 10/23/2023 0.03  0.00 - 0.04 K/uL Final    Comment: Mild elevation in immature granulocytes is non specific and   can be seen in a variety of conditions including stress response,   acute inflammation, trauma and pregnancy. Correlation with other   laboratory and clinical findings is essential.      Lymph # 10/23/2023 1.5  1.0 - 4.8 K/uL Final    Mono # 10/23/2023 0.5  0.3 - 1.0 K/uL Final    Eos # 10/23/2023 0.1  0.0 - 0.5 K/uL Final    Baso # 10/23/2023 0.04  0.00 - 0.20 K/uL Final    nRBC 10/23/2023 0  0 /100 WBC Final    Gran % 10/23/2023 73.7 (H)  38.0 - 73.0 % Final    Lymph % 10/23/2023 18.5  18.0 - 48.0 % Final    Mono % 10/23/2023 6.0  4.0 - 15.0 % Final    Eosinophil % 10/23/2023 0.9  0.0 - 8.0 % Final    Basophil % 10/23/2023 0.5  0.0 - 1.9 % Final    Differential Method 10/23/2023 Automated   Final    Sodium 10/23/2023 141  136 - 145 mmol/L Final    Potassium 10/23/2023 3.7  3.5 - 5.1 mmol/L Final    Chloride 10/23/2023 107  95 - 110 mmol/L Final    CO2 10/23/2023 17 (L)  23 - 29 mmol/L Final    Glucose 10/23/2023 125 (H)  70 - 110 mg/dL Final    BUN 10/23/2023 37 (H)  8 - 23 mg/dL Final    Creatinine 10/23/2023 3.1 (H)  0.5 - 1.4 mg/dL Final    Calcium 10/23/2023 9.1  8.7 - 10.5 mg/dL Final    Total Protein 10/23/2023 7.0  6.0 - 8.4 g/dL Final    Albumin 10/23/2023 3.0 (L)  3.5 - 5.2 g/dL Final    Total Bilirubin 10/23/2023 0.6  0.1 - 1.0 mg/dL Final    Comment: For infants and newborns, interpretation of results should be based  on gestational age, weight and in agreement with clinical  observations.    Premature Infant recommended reference ranges:  Up to 24 hours.............<8.0 mg/dL  Up to 48 hours............<12.0 mg/dL  3-5 days..................<15.0 mg/dL  6-29 days.................<15.0 mg/dL      Alkaline Phosphatase  10/23/2023 161 (H)  55 - 135 U/L Final    AST 10/23/2023 35  10 - 40 U/L Final    ALT 10/23/2023 34  10 - 44 U/L Final    eGFR 10/23/2023 20.3 (A)  >60 mL/min/1.73 m^2 Final    Anion Gap 10/23/2023 17 (H)  8 - 16 mmol/L Final    Troponin I 10/23/2023 0.033 (H)  0.000 - 0.026 ng/mL Final    Comment: The reference interval for Troponin I represents the 99th percentile   cutoff   for our facility and is consistent with 3rd generation assay   performance.      BNP 10/23/2023 156 (H)  0 - 99 pg/mL Final    Values of less than 100 pg/ml are consistent with non-CHF populations.    Magnesium 10/23/2023 1.3 (L)  1.6 - 2.6 mg/dL Final    Specimen UA 10/23/2023 Urine, Unspecified   Final    Color, UA 10/23/2023 Colorless (A)  Yellow, Straw, Stacy Final    Appearance, UA 10/23/2023 Clear  Clear Final    pH, UA 10/23/2023 6.0  5.0 - 8.0 Final    Specific Gravity, UA 10/23/2023 <1.005 (A)  1.005 - 1.030 Final    Protein, UA 10/23/2023 Negative  Negative Final    Comment: Recommend a 24 hour urine protein or a urine   protein/creatinine ratio if globulin induced proteinuria is  clinically suspected.      Glucose, UA 10/23/2023 Negative  Negative Final    Ketones, UA 10/23/2023 Negative  Negative Final    Bilirubin (UA) 10/23/2023 Negative  Negative Final    Occult Blood UA 10/23/2023 Negative  Negative Final    Nitrite, UA 10/23/2023 Negative  Negative Final    Urobilinogen, UA 10/23/2023 Negative  Negative EU/dL Final    Leukocytes, UA 10/23/2023 Negative  Negative Final    Troponin I 10/23/2023 0.019  0.000 - 0.026 ng/mL Final    Comment: The reference interval for Troponin I represents the 99th percentile   cutoff   for our facility and is consistent with 3rd generation assay   performance.     There may be more visits with results that are not included.       EKG  EKG 12/2023 normal sinus rhythm left axis deviation right bundle-branch block inferior infarct versus LAFB nonspecific ST-T changes abnormal precordial R-wave  progression low anterior forces    Echo   Results for orders placed or performed during the hospital encounter of 09/27/23   Echo   Result Value Ref Range    BSA 2.25 m2    LVIDd 4.49 3.5 - 6.0 cm    LV Systolic Volume 28.26 mL    LV Systolic Volume Index 12.6 mL/m2    LVIDs 2.75 2.1 - 4.0 cm    LV Diastolic Volume 91.75 mL    LV Diastolic Volume Index 40.96 mL/m2    IVS 1.20 (A) 0.6 - 1.1 cm    LVOT diameter 2.17 cm    LVOT area 3.7 cm2    FS 39 28 - 44 %    Left Ventricle Relative Wall Thickness 0.59 cm    Posterior Wall 1.33 (A) 0.6 - 1.1 cm    LV mass 213.12 g    LV Mass Index 95 g/m2    MV Peak E Joao 0.54 m/s    TDI LATERAL 0.04 m/s    TDI SEPTAL 0.07 m/s    E/E' ratio 9.82 m/s    MV Peak A Joao 0.91 m/s    E/A ratio 0.59     E wave deceleration time 144.49 msec    LV SEPTAL E/E' RATIO 7.71 m/s    LV LATERAL E/E' RATIO 13.50 m/s    LVOT peak joao 1.08 m/s    LA size 3.15 cm    RVDD 3.04 cm    AV peak gradient 5 mmHg    Ao peak joao 1.12 m/s    AV Velocity Ratio 0.96     KJ by Velocity Ratio 3.56 cm²    MV stenosis pressure 1/2 time 41.90 ms    MV valve area p 1/2 method 5.25 cm2    PV PEAK VELOCITY 1.33 m/s    PV peak gradient 7 mmHg    Ao root annulus 4.14 cm    IVC diameter 1.55 cm    Mean e' 0.06 m/s    ZLVIDS -4.49     ZLVIDD -5.79     AORTIC VALVE CUSP SEPERATION 2.62 cm    Est. RA pres 3 mmHg    EF 60 %    Narrative      Left Ventricle: The left ventricle is normal in size. Mildly increased   wall thickness. There is mild concentric hypertrophy. Normal wall motion.   There is normal systolic function. Ejection fraction by visual   approximation is 60%. Grade I diastolic dysfunction.    Right Ventricle: Normal right ventricular cavity size. Wall thickness   is normal. Right ventricle wall motion  is normal. Systolic function is   normal.         Imaging  No results found.    Prior coronary angiogram / intervention:  See HPI    Assessment and Plan  1. Syncope, orthostatic / autonomic dysfunction  See regimen  in HPI  Follow up with Neurology, Nephrology, Endocrinology, Gastroenterology, PCP  Working on prevention of watery stool  Apparently neurology has sent a trial of mestinon  The patient is getting scheduled IVF infusions, appreciate nephrology assistance  Was on Toprol for VT/NSVT in additional aneurysmal disease can no longer tolerate  Taking midodrine PRN 10 t.i.d. , pyridostigmine, fludrocortisone daily  Continue current medication regimen for orthostatic hypotension/autonomic dysfunction/hypertension    2. Orthostatic hypotension /autonomic dysfunction  As above    3. Essential hypertension  As above  Patient has labile hypertension with frequent hypotension very complex as in HPI  Continue hydralazine p.r.n. SBP greater than 200 sustained ( in addition to holding fludrocortisone, midodrine, pyridostigmine)  If blood pressure greater than 200 sustained should go to the emergency room  Toprol would be ideal if tolerated but currently holding     4. Coronary artery disease involving native coronary artery of native heart without angina pectoris  Continue aspirin 81 and Pravachol 40  Evidently there are allergies to Crestor and Lipitor.  These allergies are unknown.    5. Prolonged QT  Avoid QT prolonging agents     6. Hyperlipidemia, unspecified hyperlipidemia type  Pravastatin as above    7. VT (ventricular tachycardia) +/- pAF?  The patient has had implanted LINQ device  Beta-blocker would be ideal if tolerated  Do not have enough evidence of/for PAF per electrophysiology  See HPI  Follow up with electrophysiology as he is now established  Tachyarrhythmia noted on Linq uncertain rhythm, notified electrophysiology may need amiodarone    8. Ascending aortic aneurysm, unspecified whether ruptured  Control hypertension   Beta-blocker would be ideal if tolerated  Serial monitoring difficult with advanced CKD  Followed by Cardiothoracic surgery    9. Carotid stenosis  Not hemodynamically significant  Continue aspirin  statin    Total professional time spent for the encounter: 40 minutes  Time was spent preparing to see the patient, reviewing results of prior testing, obtaining and/or reviewing separately obtained history, performing a medically appropriate examination and interview, counseling and educating the patient/family, ordering medications/tests/procedures, referring and communicating with other health care professionals, documenting clinical information in the electronic health record, and independently interpreting results.     Follow Up  3 month      Aj Marrufo MD, FACC, RPVI  Interventional Cardiology

## 2024-02-02 ENCOUNTER — INFUSION (OUTPATIENT)
Dept: INFECTIOUS DISEASES | Facility: HOSPITAL | Age: 74
End: 2024-02-02
Payer: MEDICARE

## 2024-02-02 VITALS
WEIGHT: 209.31 LBS | BODY MASS INDEX: 26.86 KG/M2 | DIASTOLIC BLOOD PRESSURE: 63 MMHG | RESPIRATION RATE: 18 BRPM | SYSTOLIC BLOOD PRESSURE: 144 MMHG | OXYGEN SATURATION: 96 % | TEMPERATURE: 99 F | HEIGHT: 74 IN | HEART RATE: 83 BPM

## 2024-02-02 DIAGNOSIS — N18.32 TYPE 2 DIABETES MELLITUS WITH STAGE 3B CHRONIC KIDNEY DISEASE, WITHOUT LONG-TERM CURRENT USE OF INSULIN: Primary | ICD-10-CM

## 2024-02-02 DIAGNOSIS — Z93.3 COLOSTOMY PRESENT: ICD-10-CM

## 2024-02-02 DIAGNOSIS — E11.22 TYPE 2 DIABETES MELLITUS WITH STAGE 3B CHRONIC KIDNEY DISEASE, WITHOUT LONG-TERM CURRENT USE OF INSULIN: Primary | ICD-10-CM

## 2024-02-02 DIAGNOSIS — E86.0 DEHYDRATION: ICD-10-CM

## 2024-02-02 PROCEDURE — 96360 HYDRATION IV INFUSION INIT: CPT | Mod: HCNC

## 2024-02-02 PROCEDURE — 25000003 PHARM REV CODE 250: Mod: HCNC | Performed by: NURSE PRACTITIONER

## 2024-02-02 RX ORDER — SODIUM CHLORIDE 0.9 % (FLUSH) 0.9 %
10 SYRINGE (ML) INJECTION
Status: CANCELLED | OUTPATIENT
Start: 2024-02-06

## 2024-02-02 RX ORDER — HEPARIN 100 UNIT/ML
500 SYRINGE INTRAVENOUS
Status: CANCELLED | OUTPATIENT
Start: 2024-02-06

## 2024-02-02 RX ADMIN — SODIUM CHLORIDE 1000 ML: 9 INJECTION, SOLUTION INTRAVENOUS at 08:02

## 2024-02-05 LAB
OHS CV AF BURDEN PERCENT: 5.9
OHS CV DC REMOTE DEVICE TYPE: NORMAL
OHS CV ICD SHOCK: NO

## 2024-02-06 ENCOUNTER — TELEPHONE (OUTPATIENT)
Dept: ELECTROPHYSIOLOGY | Facility: CLINIC | Age: 74
End: 2024-02-06
Payer: MEDICARE

## 2024-02-06 ENCOUNTER — TELEPHONE (OUTPATIENT)
Dept: NEPHROLOGY | Facility: CLINIC | Age: 74
End: 2024-02-06
Payer: MEDICARE

## 2024-02-06 ENCOUNTER — INFUSION (OUTPATIENT)
Dept: INFECTIOUS DISEASES | Facility: HOSPITAL | Age: 74
End: 2024-02-06
Payer: MEDICARE

## 2024-02-06 VITALS
OXYGEN SATURATION: 98 % | HEART RATE: 95 BPM | HEIGHT: 74 IN | SYSTOLIC BLOOD PRESSURE: 154 MMHG | WEIGHT: 208.75 LBS | RESPIRATION RATE: 19 BRPM | BODY MASS INDEX: 26.79 KG/M2 | DIASTOLIC BLOOD PRESSURE: 94 MMHG | TEMPERATURE: 99 F

## 2024-02-06 DIAGNOSIS — E11.22 TYPE 2 DIABETES MELLITUS WITH STAGE 3B CHRONIC KIDNEY DISEASE, WITHOUT LONG-TERM CURRENT USE OF INSULIN: Primary | ICD-10-CM

## 2024-02-06 DIAGNOSIS — N18.32 TYPE 2 DIABETES MELLITUS WITH STAGE 3B CHRONIC KIDNEY DISEASE, WITHOUT LONG-TERM CURRENT USE OF INSULIN: Primary | ICD-10-CM

## 2024-02-06 DIAGNOSIS — Z93.3 COLOSTOMY PRESENT: ICD-10-CM

## 2024-02-06 DIAGNOSIS — E86.0 DEHYDRATION: ICD-10-CM

## 2024-02-06 PROCEDURE — 25000003 PHARM REV CODE 250: Mod: HCNC | Performed by: NURSE PRACTITIONER

## 2024-02-06 PROCEDURE — 96365 THER/PROPH/DIAG IV INF INIT: CPT | Mod: HCNC

## 2024-02-06 RX ORDER — SODIUM CHLORIDE 0.9 % (FLUSH) 0.9 %
10 SYRINGE (ML) INJECTION
Status: CANCELLED | OUTPATIENT
Start: 2024-02-09

## 2024-02-06 RX ORDER — HEPARIN 100 UNIT/ML
500 SYRINGE INTRAVENOUS
Status: CANCELLED | OUTPATIENT
Start: 2024-02-09

## 2024-02-06 RX ADMIN — SODIUM CHLORIDE 1000 ML: 9 INJECTION, SOLUTION INTRAVENOUS at 08:02

## 2024-02-06 NOTE — TELEPHONE ENCOUNTER
ILR alert received for multiple tachy episodes occurring 2/1/24 thru 2/2, max duration 17 minutes (egram not available).    Seen by Dr. Marrufo last week, unable to tolerate BB at this time due to hypotension.  Messaged the MA team to arrange EP follow up for his SVT/AT episodes and treatment options.

## 2024-02-06 NOTE — TELEPHONE ENCOUNTER
Spoke to pt and scheduled     ----- Message from Tulio Machado sent at 2/6/2024  1:22 PM CST -----  Regarding: Reschedule Appt  Contact: [phone]  Hi, pt called for the second time to request a call back to reschedule his missed appt from 02/02/24. Pls call the pt at  [phone] to discuss rescheduling the appt.  Thank you.

## 2024-02-07 ENCOUNTER — TELEPHONE (OUTPATIENT)
Dept: ELECTROPHYSIOLOGY | Facility: CLINIC | Age: 74
End: 2024-02-07
Payer: MEDICARE

## 2024-02-07 ENCOUNTER — OFFICE VISIT (OUTPATIENT)
Dept: PRIMARY CARE CLINIC | Facility: CLINIC | Age: 74
End: 2024-02-07
Payer: MEDICARE

## 2024-02-07 VITALS
RESPIRATION RATE: 18 BRPM | WEIGHT: 206.56 LBS | SYSTOLIC BLOOD PRESSURE: 138 MMHG | HEART RATE: 95 BPM | OXYGEN SATURATION: 98 % | BODY MASS INDEX: 26.51 KG/M2 | DIASTOLIC BLOOD PRESSURE: 86 MMHG | HEIGHT: 74 IN

## 2024-02-07 DIAGNOSIS — I47.29 PAROXYSMAL VENTRICULAR TACHYCARDIA: ICD-10-CM

## 2024-02-07 DIAGNOSIS — Z87.19 HISTORY OF ULCERATIVE COLITIS: ICD-10-CM

## 2024-02-07 DIAGNOSIS — G63 POLYNEUROPATHY ASSOCIATED WITH UNDERLYING DISEASE: ICD-10-CM

## 2024-02-07 DIAGNOSIS — N18.30 TYPE 2 DIABETES MELLITUS WITH STAGE 3 CHRONIC KIDNEY DISEASE, WITHOUT LONG-TERM CURRENT USE OF INSULIN, UNSPECIFIED WHETHER STAGE 3A OR 3B CKD: ICD-10-CM

## 2024-02-07 DIAGNOSIS — I10 ESSENTIAL HYPERTENSION: ICD-10-CM

## 2024-02-07 DIAGNOSIS — Z87.39 HISTORY OF GOUT: ICD-10-CM

## 2024-02-07 DIAGNOSIS — E11.22 TYPE 2 DIABETES MELLITUS WITH STAGE 3 CHRONIC KIDNEY DISEASE, WITHOUT LONG-TERM CURRENT USE OF INSULIN, UNSPECIFIED WHETHER STAGE 3A OR 3B CKD: ICD-10-CM

## 2024-02-07 DIAGNOSIS — N25.81 SECONDARY HYPERPARATHYROIDISM: ICD-10-CM

## 2024-02-07 DIAGNOSIS — F32.2 MAJOR DEPRESSIVE DISORDER, SINGLE EPISODE, SEVERE WITHOUT PSYCHOTIC FEATURES: ICD-10-CM

## 2024-02-07 DIAGNOSIS — Z95.828 PRESENCE OF ARTERIAL STENT: ICD-10-CM

## 2024-02-07 DIAGNOSIS — M25.511 ACUTE PAIN OF RIGHT SHOULDER: ICD-10-CM

## 2024-02-07 DIAGNOSIS — N18.4 CKD (CHRONIC KIDNEY DISEASE), STAGE IV: ICD-10-CM

## 2024-02-07 DIAGNOSIS — E78.00 PURE HYPERCHOLESTEROLEMIA: ICD-10-CM

## 2024-02-07 DIAGNOSIS — E34.0 CARCINOID SYNDROME: ICD-10-CM

## 2024-02-07 DIAGNOSIS — Z93.2 HIGH OUTPUT ILEOSTOMY: ICD-10-CM

## 2024-02-07 DIAGNOSIS — R19.8 HIGH OUTPUT ILEOSTOMY: ICD-10-CM

## 2024-02-07 DIAGNOSIS — C61 PROSTATE CANCER: ICD-10-CM

## 2024-02-07 DIAGNOSIS — L03.116 CELLULITIS OF LEFT FOOT: Primary | ICD-10-CM

## 2024-02-07 PROCEDURE — 3044F HG A1C LEVEL LT 7.0%: CPT | Mod: HCNC,CPTII,S$GLB, | Performed by: FAMILY MEDICINE

## 2024-02-07 PROCEDURE — 1126F AMNT PAIN NOTED NONE PRSNT: CPT | Mod: HCNC,CPTII,S$GLB, | Performed by: FAMILY MEDICINE

## 2024-02-07 PROCEDURE — 3008F BODY MASS INDEX DOCD: CPT | Mod: HCNC,CPTII,S$GLB, | Performed by: FAMILY MEDICINE

## 2024-02-07 PROCEDURE — 99999 PR PBB SHADOW E&M-EST. PATIENT-LVL V: CPT | Mod: PBBFAC,HCNC,, | Performed by: FAMILY MEDICINE

## 2024-02-07 PROCEDURE — 3288F FALL RISK ASSESSMENT DOCD: CPT | Mod: HCNC,CPTII,S$GLB, | Performed by: FAMILY MEDICINE

## 2024-02-07 PROCEDURE — 1159F MED LIST DOCD IN RCRD: CPT | Mod: HCNC,CPTII,S$GLB, | Performed by: FAMILY MEDICINE

## 2024-02-07 PROCEDURE — 99214 OFFICE O/P EST MOD 30 MIN: CPT | Mod: HCNC,S$GLB,, | Performed by: FAMILY MEDICINE

## 2024-02-07 PROCEDURE — 3079F DIAST BP 80-89 MM HG: CPT | Mod: HCNC,CPTII,S$GLB, | Performed by: FAMILY MEDICINE

## 2024-02-07 PROCEDURE — 3075F SYST BP GE 130 - 139MM HG: CPT | Mod: HCNC,CPTII,S$GLB, | Performed by: FAMILY MEDICINE

## 2024-02-07 PROCEDURE — 1101F PT FALLS ASSESS-DOCD LE1/YR: CPT | Mod: HCNC,CPTII,S$GLB, | Performed by: FAMILY MEDICINE

## 2024-02-07 RX ORDER — TRAMADOL HYDROCHLORIDE 50 MG/1
50 TABLET ORAL EVERY 6 HOURS PRN
Qty: 30 TABLET | Refills: 0 | Status: SHIPPED | OUTPATIENT
Start: 2024-02-07 | End: 2024-02-17

## 2024-02-07 RX ORDER — COLCHICINE 0.6 MG/1
0.6 TABLET ORAL DAILY PRN
Qty: 10 TABLET | Refills: 0
Start: 2024-02-07 | End: 2024-02-12

## 2024-02-07 RX ORDER — CEPHALEXIN 500 MG/1
500 CAPSULE ORAL 4 TIMES DAILY
Qty: 40 CAPSULE | Refills: 0 | Status: ON HOLD | OUTPATIENT
Start: 2024-02-07 | End: 2024-04-30 | Stop reason: HOSPADM

## 2024-02-07 NOTE — PROGRESS NOTES
Subjective:       Patient ID: Jarrett Charlton Jr. is a 73 y.o. male.    Chief Complaint: Foot Swelling    HPI: 74 yo WM in for celluitis left foot--erythema and swelling of the left lateral foot yesterday afternoon--involves the 2nd metatarsal cross the lateral dorsum of the foot to the ankle to the 5th MTP in all toes 2nd through for--patient states has some bony abnormalities of the medial portion of the foot--told arthritis of the foot with the bones are rubbing on bone. Saw podiatrist pt told did not want do anything right now   Hx gout has colcrys On metformin and amaryl Goes Tu and Fri to infusion center for kidneys regular saline one bad     ROS:  Skin: no psoriasis, eczema, skin cancer--does get some skin cancers on the forearms used to be a --appears to have cellulitis of the lateral aspect of the left foot  HEENT: No headache, ocular pain, blurred vision, diplopia, epistaxis, hoarseness change in voice, thyroid trouble  Lung: No pneumonia, asthma, Tb, wheezing, SOB, no smoking  Heart: No chest pain, ankle edema, palpitations, MI, kemal murmur, +hypertension, + hyperlipidemia ventricular tachycardia aortic aneurysms CAD with stent no bypass  Abdomen: No nausea, vomiting, diarrhea, constipation, ulcers, hepatitis, gallbladder disease, melena, hematochezia, hematemesis---history cholecystectomy several months ago has ileostomy GERD history of ulcerative colitis  : no UTI, renal disease, stones--chronic renal insufficiency stage IV now stage III does get IV infusions saline twice a day  MS: no fractures, O/A, lupus, rheumatoid, +gout  Neuro: No dizziness, LOC, seizures   + diabetes, no anemia, no anxiety, no depression    wife  3 years ago--1 daughter--retired--lives with 1 granddaughter    Objective:   Physical Exam:  General: Well nourished, well developed, no acute distress overweight   Skin:  Cellulitis of the lateral left foot--starts at the 2nd MTP goes to the toes 2nd through  the 4th toes through the lateral aspect of the foot to the ankle over the dorsum of the foot back to the 2nd MTP area is slightly warm touch  HEENT: Eyes PERRLA, EOM intact, nose patent, throat non-erythematous ears TMs clear  NECK: Supple, no bruits, No JVD, no nodes  Lungs: Clear, no rales, rhonchi, wheezing  Heart: Regular rate and rhythm, no murmurs, gallops, or rubs  Abdomen: flat, bowel sounds positive, no tenderness, or organomegaly  MS: Range of motion and muscle strength intact--tenderness left foot   Neuro: Alert, CN intact, oriented X 3  Extremities: No cyanosis, clubbing, or edema         Assessment:       1. Cellulitis of left foot    2. Acute pain of right shoulder    3. Essential hypertension    4. Pure hypercholesterolemia    5. Paroxysmal ventricular tachycardia    6. Presence of arterial stent    7. CKD (chronic kidney disease), stage IV    8. Prostate cancer    9. Type 2 diabetes mellitus with stage 3 chronic kidney disease, without long-term current use of insulin, unspecified whether stage 3a or 3b CKD    10. History of ulcerative colitis    11. High output ileostomy    12. History of gout        Plan:       Cellulitis of left foot  -     X-Ray Foot Complete 3 view Left; Future; Expected date: 02/07/2024  -     CBC Auto Differential; Future; Expected date: 02/07/2024  -     Comprehensive Metabolic Panel; Future; Expected date: 02/07/2024  -     Uric Acid; Future; Expected date: 02/07/2024    Acute pain of right shoulder  Comments:  will treat as gout DM well controlled try IM kenalog and po pain medication consider colchicine if not bettter  Orders:  -     colchicine (COLCRYS) 0.6 mg tablet; Take 1 tablet (0.6 mg total) by mouth daily as needed (Gout Flare).  Dispense: 10 tablet; Refill: 0    Essential hypertension    Pure hypercholesterolemia    Paroxysmal ventricular tachycardia    Presence of arterial stent    CKD (chronic kidney disease), stage IV    Prostate cancer    Type 2 diabetes  mellitus with stage 3 chronic kidney disease, without long-term current use of insulin, unspecified whether stage 3a or 3b CKD  -     Ambulatory referral/consult to Endocrinology; Future; Expected date: 02/14/2024    History of ulcerative colitis    High output ileostomy    History of gout    Other orders  -     cephALEXin (KEFLEX) 500 MG capsule; Take 1 capsule (500 mg total) by mouth 4 (four) times daily.  Dispense: 40 capsule; Refill: 0  -     traMADoL (ULTRAM) 50 mg tablet; Take 1 tablet (50 mg total) by mouth every 6 (six) hours as needed.  Dispense: 30 tablet; Refill: 0        Cellulitis left foot--will treat with Keflex 500 mg q.i.d. times 10 days---history of gout in the past will give colchicine 0.6 mg once a day--x-ray the left foot--See Dr Gonzalez one week  Diabetes--glucose elevated at 200--patient states on metformin told should not be on metformin with renal disease should be on Amaryl 4 mg b.i.d.--last glucose 145--due to renal problems will have patient see endocrine nurse practitioner last hemoglobin A1c 6.6  History chronic renal insufficiency stage IV seeing nephrologist getting saline infusions on Tuesday and Friday BUN has improved to 31 creatinine 2.3 glomerular filtration rate 29 was 17  Return 1 week see DR Gonzalez --do lab CBC CMP uric acid

## 2024-02-10 PROBLEM — N25.81 SECONDARY HYPERPARATHYROIDISM: Status: ACTIVE | Noted: 2024-02-10

## 2024-02-10 PROBLEM — E34.00 CARCINOID SYNDROME: Status: ACTIVE | Noted: 2024-02-10

## 2024-02-10 PROBLEM — E34.0 CARCINOID SYNDROME: Status: ACTIVE | Noted: 2024-02-10

## 2024-02-10 PROBLEM — F32.2 MAJOR DEPRESSIVE DISORDER, SINGLE EPISODE, SEVERE WITHOUT PSYCHOTIC FEATURES: Status: ACTIVE | Noted: 2024-02-10

## 2024-02-12 ENCOUNTER — OFFICE VISIT (OUTPATIENT)
Dept: PRIMARY CARE CLINIC | Facility: CLINIC | Age: 74
End: 2024-02-12
Payer: MEDICARE

## 2024-02-12 ENCOUNTER — INFUSION (OUTPATIENT)
Dept: INFECTIOUS DISEASES | Facility: HOSPITAL | Age: 74
End: 2024-02-12
Attending: INTERNAL MEDICINE
Payer: MEDICARE

## 2024-02-12 VITALS
OXYGEN SATURATION: 98 % | DIASTOLIC BLOOD PRESSURE: 96 MMHG | SYSTOLIC BLOOD PRESSURE: 196 MMHG | HEART RATE: 74 BPM | BODY MASS INDEX: 26.71 KG/M2 | HEIGHT: 74 IN | RESPIRATION RATE: 18 BRPM | TEMPERATURE: 98 F | WEIGHT: 208.13 LBS

## 2024-02-12 VITALS
OXYGEN SATURATION: 97 % | DIASTOLIC BLOOD PRESSURE: 88 MMHG | SYSTOLIC BLOOD PRESSURE: 122 MMHG | HEIGHT: 74 IN | HEART RATE: 100 BPM | WEIGHT: 208.25 LBS | RESPIRATION RATE: 18 BRPM | BODY MASS INDEX: 26.73 KG/M2

## 2024-02-12 DIAGNOSIS — N18.4 CKD (CHRONIC KIDNEY DISEASE), STAGE IV: ICD-10-CM

## 2024-02-12 DIAGNOSIS — E11.22 TYPE 2 DIABETES MELLITUS WITH STAGE 3B CHRONIC KIDNEY DISEASE, WITHOUT LONG-TERM CURRENT USE OF INSULIN: Primary | ICD-10-CM

## 2024-02-12 DIAGNOSIS — E86.0 DEHYDRATION: ICD-10-CM

## 2024-02-12 DIAGNOSIS — E11.22 TYPE 2 DIABETES MELLITUS WITH STAGE 3 CHRONIC KIDNEY DISEASE, WITHOUT LONG-TERM CURRENT USE OF INSULIN, UNSPECIFIED WHETHER STAGE 3A OR 3B CKD: ICD-10-CM

## 2024-02-12 DIAGNOSIS — Z93.3 COLOSTOMY PRESENT: ICD-10-CM

## 2024-02-12 DIAGNOSIS — Z87.39 HISTORY OF GOUT: Primary | ICD-10-CM

## 2024-02-12 DIAGNOSIS — N18.32 TYPE 2 DIABETES MELLITUS WITH STAGE 3B CHRONIC KIDNEY DISEASE, WITHOUT LONG-TERM CURRENT USE OF INSULIN: Primary | ICD-10-CM

## 2024-02-12 DIAGNOSIS — N18.30 TYPE 2 DIABETES MELLITUS WITH STAGE 3 CHRONIC KIDNEY DISEASE, WITHOUT LONG-TERM CURRENT USE OF INSULIN, UNSPECIFIED WHETHER STAGE 3A OR 3B CKD: ICD-10-CM

## 2024-02-12 DIAGNOSIS — Z93.2 HIGH OUTPUT ILEOSTOMY: ICD-10-CM

## 2024-02-12 DIAGNOSIS — H40.9 GLAUCOMA, UNSPECIFIED GLAUCOMA TYPE, UNSPECIFIED LATERALITY: ICD-10-CM

## 2024-02-12 DIAGNOSIS — R19.8 HIGH OUTPUT ILEOSTOMY: ICD-10-CM

## 2024-02-12 DIAGNOSIS — E34.0 CARCINOID SYNDROME: ICD-10-CM

## 2024-02-12 LAB
OHS CV AF BURDEN PERCENT: < 1
OHS CV DC REMOTE DEVICE TYPE: NORMAL
OHS CV ICD SHOCK: NO

## 2024-02-12 PROCEDURE — 25000003 PHARM REV CODE 250: Mod: HCNC | Performed by: NURSE PRACTITIONER

## 2024-02-12 PROCEDURE — 99999 PR PBB SHADOW E&M-EST. PATIENT-LVL III: CPT | Mod: PBBFAC,HCNC,, | Performed by: INTERNAL MEDICINE

## 2024-02-12 PROCEDURE — 1160F RVW MEDS BY RX/DR IN RCRD: CPT | Mod: HCNC,CPTII,S$GLB, | Performed by: INTERNAL MEDICINE

## 2024-02-12 PROCEDURE — 96360 HYDRATION IV INFUSION INIT: CPT | Mod: HCNC

## 2024-02-12 PROCEDURE — 3044F HG A1C LEVEL LT 7.0%: CPT | Mod: HCNC,CPTII,S$GLB, | Performed by: INTERNAL MEDICINE

## 2024-02-12 PROCEDURE — 3008F BODY MASS INDEX DOCD: CPT | Mod: HCNC,CPTII,S$GLB, | Performed by: INTERNAL MEDICINE

## 2024-02-12 PROCEDURE — 3074F SYST BP LT 130 MM HG: CPT | Mod: HCNC,CPTII,S$GLB, | Performed by: INTERNAL MEDICINE

## 2024-02-12 PROCEDURE — 1159F MED LIST DOCD IN RCRD: CPT | Mod: HCNC,CPTII,S$GLB, | Performed by: INTERNAL MEDICINE

## 2024-02-12 PROCEDURE — 3079F DIAST BP 80-89 MM HG: CPT | Mod: HCNC,CPTII,S$GLB, | Performed by: INTERNAL MEDICINE

## 2024-02-12 PROCEDURE — 99213 OFFICE O/P EST LOW 20 MIN: CPT | Mod: HCNC,S$GLB,, | Performed by: INTERNAL MEDICINE

## 2024-02-12 PROCEDURE — 3288F FALL RISK ASSESSMENT DOCD: CPT | Mod: HCNC,CPTII,S$GLB, | Performed by: INTERNAL MEDICINE

## 2024-02-12 PROCEDURE — 1126F AMNT PAIN NOTED NONE PRSNT: CPT | Mod: HCNC,CPTII,S$GLB, | Performed by: INTERNAL MEDICINE

## 2024-02-12 PROCEDURE — 1101F PT FALLS ASSESS-DOCD LE1/YR: CPT | Mod: HCNC,CPTII,S$GLB, | Performed by: INTERNAL MEDICINE

## 2024-02-12 RX ORDER — HEPARIN 100 UNIT/ML
500 SYRINGE INTRAVENOUS
Status: CANCELLED | OUTPATIENT
Start: 2024-02-13

## 2024-02-12 RX ORDER — BRIMONIDINE TARTRATE 2 MG/ML
1 SOLUTION/ DROPS OPHTHALMIC 2 TIMES DAILY
COMMUNITY
End: 2024-02-12 | Stop reason: SDUPTHER

## 2024-02-12 RX ORDER — SODIUM BICARBONATE 650 MG/1
650 TABLET ORAL DAILY
Qty: 90 TABLET | Refills: 3 | Status: SHIPPED | OUTPATIENT
Start: 2024-02-12 | End: 2024-02-23

## 2024-02-12 RX ORDER — HEPARIN 100 UNIT/ML
500 SYRINGE INTRAVENOUS
Status: DISCONTINUED | OUTPATIENT
Start: 2024-02-12 | End: 2024-02-12 | Stop reason: HOSPADM

## 2024-02-12 RX ORDER — SODIUM CHLORIDE 0.9 % (FLUSH) 0.9 %
10 SYRINGE (ML) INJECTION
Status: DISCONTINUED | OUTPATIENT
Start: 2024-02-12 | End: 2024-02-12 | Stop reason: HOSPADM

## 2024-02-12 RX ORDER — BRIMONIDINE TARTRATE 2 MG/ML
1 SOLUTION/ DROPS OPHTHALMIC 2 TIMES DAILY
Qty: 10 ML | Refills: 5 | Status: SHIPPED | OUTPATIENT
Start: 2024-02-12

## 2024-02-12 RX ORDER — COLCHICINE 0.6 MG/1
TABLET ORAL
Qty: 30 TABLET | Refills: 1 | Status: SHIPPED | OUTPATIENT
Start: 2024-02-12

## 2024-02-12 RX ORDER — SODIUM CHLORIDE 0.9 % (FLUSH) 0.9 %
10 SYRINGE (ML) INJECTION
Status: CANCELLED | OUTPATIENT
Start: 2024-02-13

## 2024-02-12 RX ADMIN — SODIUM CHLORIDE 1000 ML: 9 INJECTION, SOLUTION INTRAVENOUS at 08:02

## 2024-02-12 NOTE — PROGRESS NOTES
Subjective:       Patient ID: Jarrett Charlton Jr. is a 73 y.o. male.    Chief Complaint: Follow-up    HPI  patient with history of high output ileostomy with intermittent hypotension presyncope chronic renal insufficiency BPH diabetes mellitus coronary artery disease gouty arthritis hypertension hyperlipidemia patient states been having eye exam by ophthalmologist at the eye Center in [facility] his visit today request treatment for gouty arthritis when he flare-up he has a stable chronic renal insufficiency can not take any NSAIDs and diabetes mellitus avoid steroid he is on allopurinol but still have flare-up of gout attack in the left foot very painful when it happened currently he denies short of breath chest pain dizziness hypotension his still getting IV fluid every week at the IV center for hypo volemia from high output ileostomy  Review of Systems    Objective:      Physical Exam  Vitals and nursing note reviewed.   Constitutional:       General: He is not in acute distress.     Appearance: He is well-developed.   HENT:      Head: Normocephalic and atraumatic.      Right Ear: External ear normal.      Left Ear: External ear normal.      Nose: Nose normal.      Mouth/Throat:      Pharynx: No oropharyngeal exudate.   Eyes:      Extraocular Movements: Extraocular movements intact.      Conjunctiva/sclera: Conjunctivae normal.      Pupils: Pupils are equal, round, and reactive to light.   Neck:      Thyroid: No thyromegaly.   Cardiovascular:      Rate and Rhythm: Normal rate and regular rhythm.      Heart sounds: Normal heart sounds. No murmur heard.     No friction rub. No gallop.   Pulmonary:      Effort: Pulmonary effort is normal. No respiratory distress.      Breath sounds: Normal breath sounds. No wheezing.   Abdominal:      General: Bowel sounds are normal. There is no distension.      Palpations: Abdomen is soft.      Tenderness: There is no abdominal tenderness.   Musculoskeletal:         General: No  tenderness or deformity. Normal range of motion.      Cervical back: Normal range of motion and neck supple.   Lymphadenopathy:      Cervical: No cervical adenopathy.   Skin:     General: Skin is warm and dry.      Findings: No erythema or rash.   Neurological:      Mental Status: He is alert and oriented to person, place, and time.   Psychiatric:         Mood and Affect: Mood normal.         Thought Content: Thought content normal.         Judgment: Judgment normal.         Assessment:       1. History of gout    2. Carcinoid syndrome    3. Type 2 diabetes mellitus with stage 3 chronic kidney disease, without long-term current use of insulin, unspecified whether stage 3a or 3b CKD    4. High output ileostomy    5. Glaucoma, unspecified glaucoma type, unspecified laterality    6. CKD (chronic kidney disease), stage IV        Plan:       History of gout  Comments:  Will price colchicine to be used p.r.n. for flare-up with renal doses  Orders:  -     colchicine (COLCRYS) 0.6 mg tablet; Take 2 then 1 one hour later prn gout can repeta in 2 weeks prn  Dispense: 30 tablet; Refill: 1    Carcinoid syndrome  -     sodium bicarbonate 650 MG tablet; Take 1 tablet (650 mg total) by mouth once daily.  Dispense: 90 tablet; Refill: 3    Type 2 diabetes mellitus with stage 3 chronic kidney disease, without long-term current use of insulin, unspecified whether stage 3a or 3b CKD  Comments:  Fairly control feet exam normal bilaterally  Orders:  -     Foot Exam Performed    High output ileostomy    Glaucoma, unspecified glaucoma type, unspecified laterality  -     brimonidine 0.2% (ALPHAGAN) 0.2 % Drop; Place 1 drop into both eyes 2 (two) times a day.  Dispense: 10 mL; Refill: 5    CKD (chronic kidney disease), stage IV  Comments:  Avoid all NSAIDs PPI  Orders:  -     Comprehensive Metabolic Panel; Future; Expected date: 02/12/2024        Medication List with Changes/Refills   New Medications    COLCHICINE (COLCRYS) 0.6 MG TABLET     Take 2 then 1 one hour later prn gout can repeta in 2 weeks prn   Current Medications    ACCU-CHEK PAUL CONTROL SOLN SOLN    1 drop by NOT APPLICABLE route once daily.    ACCU-CHEK GUIDE TEST STRIPS STRP    TEST BLOOD SUGAR THREE TIMES DAILY    ACCU-CHEK SOFTCLIX LANCETS MISC    TEST BLOOD SUGAR THREE TIMES DAILY    ASPIRIN (ECOTRIN) 81 MG EC TABLET    Take 1 tablet (81 mg total) by mouth once daily.    BLOOD-GLUCOSE METER (ACCU-CHEK GUIDE GLUCOSE METER) [facility]    Accucheck BID    CALCIUM-VITAMIN D3 (OS-DAKOTA 500 + D3) 500 MG-5 MCG (200 UNIT) PER TABLET    Take 1 tablet by mouth once daily.    CAPSAICIN 0.1 % CREA    Apply painful joint TID    CEPHALEXIN (KEFLEX) 500 MG CAPSULE    Take 1 capsule (500 mg total) by mouth 4 (four) times daily.    CHOLESTYRAMINE-ASPARTAME (QUESTRAN LIGHT) 4 GRAM PWPK    Take 1 packet (4 g total) by mouth 3 (three) times daily with meals.    DROPSAFE ALCOHOL PREP PADS PADM    USE TOPICALLY 5 TIMES DAILY.    FLUDROCORTISONE (FLORINEF) 0.1 MG TAB    Take 1 tablet (100 mcg total) by mouth once daily.    GABAPENTIN (NEURONTIN) 100 MG CAPSULE    Take 2 capsules (200 mg total) by mouth 3 (three) times daily.    GLIMEPIRIDE (AMARYL) 4 MG TABLET    TAKE 1 TABLET TWICE DAILY BEFORE MEALS    HYDRALAZINE (APRESOLINE) 25 MG TABLET    TAKE 1 TABLET THREE TIMES DAILY AS NEEDED FOR HIGH BLOOD PRESSURE: SYSTOLIC ABOVE 180 OR DIASTOLIC ABOVE 100.    LEVOCETIRIZINE (XYZAL) 5 MG TABLET    Take 1 tablet (5 mg total) by mouth every evening.    LIDOCAINE (LIDODERM) 5 %    Place 1 patch onto the skin as needed. Remove & Discard patch within 12 hours or as directed by MD    MAGNESIUM OXIDE (MAG-OX) 400 MG (241.3 MG MAGNESIUM) TABLET    Take 1 tablet (400 mg total) by mouth once daily.    METOPROLOL SUCCINATE (TOPROL-XL) 25 MG 24 HR TABLET    Take 1 tablet (25 mg total) by mouth once daily. Do not take if there is hypotension (low blood pressure)    MIDODRINE (PROAMATINE) 10 MG TABLET    Take 1 tablet (10 mg total)  by mouth 3 (three) times daily as needed (Hypotension).    PANTOPRAZOLE (PROTONIX) 40 MG TABLET    Take 1 tablet (40 mg total) by mouth once daily.    PRAVASTATIN (PRAVACHOL) 40 MG TABLET    Take 1 tablet (40 mg total) by mouth once daily.    PREDNISONE (DELTASONE) 20 MG TABLET    Take 1 tablet (20 mg total) by mouth 2 (two) times daily as needed (Gout Flare).    PYRIDOSTIGMINE BROMIDE 30 MG TAB    Take 30 mg by mouth every 8 (eight) hours.    TRAMADOL (ULTRAM) 50 MG TABLET    Take 1 tablet (50 mg total) by mouth every 6 (six) hours as needed.   Changed and/or Refilled Medications    Modified Medication Previous Medication    BRIMONIDINE 0.2% (ALPHAGAN) 0.2 % DROP brimonidine 0.2% (ALPHAGAN) 0.2 % Drop       Place 1 drop into both eyes 2 (two) times a day.    1 drop 2 (two) times a day.    SODIUM BICARBONATE 650 MG TABLET sodium bicarbonate 650 MG tablet       Take 1 tablet (650 mg total) by mouth once daily.    Take 1 tablet (650 mg total) by mouth once daily.   Discontinued Medications    COLCHICINE (COLCRYS) 0.6 MG TABLET    Take 1 tablet (0.6 mg total) by mouth daily as needed (Gout Flare).

## 2024-02-13 ENCOUNTER — CLINICAL SUPPORT (OUTPATIENT)
Dept: CARDIOLOGY | Facility: HOSPITAL | Age: 74
End: 2024-02-13
Payer: MEDICARE

## 2024-02-13 DIAGNOSIS — Z95.818 PRESENCE OF OTHER CARDIAC IMPLANTS AND GRAFTS: ICD-10-CM

## 2024-02-15 ENCOUNTER — CLINICAL SUPPORT (OUTPATIENT)
Dept: CARDIOLOGY | Facility: HOSPITAL | Age: 74
End: 2024-02-15
Attending: INTERNAL MEDICINE
Payer: MEDICARE

## 2024-02-15 DIAGNOSIS — Z95.818 PRESENCE OF OTHER CARDIAC IMPLANTS AND GRAFTS: ICD-10-CM

## 2024-02-15 PROCEDURE — 93298 REM INTERROG DEV EVAL SCRMS: CPT | Mod: 26,HCNC,, | Performed by: INTERNAL MEDICINE

## 2024-02-16 ENCOUNTER — INFUSION (OUTPATIENT)
Dept: INFECTIOUS DISEASES | Facility: HOSPITAL | Age: 74
End: 2024-02-16
Payer: MEDICARE

## 2024-02-16 ENCOUNTER — OFFICE VISIT (OUTPATIENT)
Dept: RHEUMATOLOGY | Facility: CLINIC | Age: 74
End: 2024-02-16
Payer: MEDICARE

## 2024-02-16 VITALS
HEIGHT: 74 IN | DIASTOLIC BLOOD PRESSURE: 93 MMHG | WEIGHT: 211.88 LBS | BODY MASS INDEX: 27.19 KG/M2 | HEART RATE: 68 BPM | SYSTOLIC BLOOD PRESSURE: 156 MMHG

## 2024-02-16 VITALS
HEIGHT: 74 IN | OXYGEN SATURATION: 97 % | TEMPERATURE: 98 F | HEART RATE: 79 BPM | WEIGHT: 206.13 LBS | DIASTOLIC BLOOD PRESSURE: 87 MMHG | RESPIRATION RATE: 16 BRPM | SYSTOLIC BLOOD PRESSURE: 140 MMHG | BODY MASS INDEX: 26.45 KG/M2

## 2024-02-16 DIAGNOSIS — Z93.3 COLOSTOMY PRESENT: ICD-10-CM

## 2024-02-16 DIAGNOSIS — N18.32 TYPE 2 DIABETES MELLITUS WITH STAGE 3B CHRONIC KIDNEY DISEASE, WITHOUT LONG-TERM CURRENT USE OF INSULIN: Primary | ICD-10-CM

## 2024-02-16 DIAGNOSIS — E86.0 DEHYDRATION: ICD-10-CM

## 2024-02-16 DIAGNOSIS — E11.42 TYPE 2 DIABETES MELLITUS WITH DIABETIC POLYNEUROPATHY, WITHOUT LONG-TERM CURRENT USE OF INSULIN: ICD-10-CM

## 2024-02-16 DIAGNOSIS — N18.4 CKD (CHRONIC KIDNEY DISEASE), STAGE IV: ICD-10-CM

## 2024-02-16 DIAGNOSIS — M79.672 LEFT FOOT PAIN: Primary | ICD-10-CM

## 2024-02-16 DIAGNOSIS — M1A.09X0 IDIOPATHIC CHRONIC GOUT OF MULTIPLE SITES WITHOUT TOPHUS: ICD-10-CM

## 2024-02-16 DIAGNOSIS — E11.22 TYPE 2 DIABETES MELLITUS WITH STAGE 3B CHRONIC KIDNEY DISEASE, WITHOUT LONG-TERM CURRENT USE OF INSULIN: Primary | ICD-10-CM

## 2024-02-16 DIAGNOSIS — M15.9 GENERALIZED OSTEOARTHRITIS: ICD-10-CM

## 2024-02-16 PROCEDURE — 3077F SYST BP >= 140 MM HG: CPT | Mod: HCNC,CPTII,S$GLB, | Performed by: INTERNAL MEDICINE

## 2024-02-16 PROCEDURE — 1126F AMNT PAIN NOTED NONE PRSNT: CPT | Mod: HCNC,CPTII,S$GLB, | Performed by: INTERNAL MEDICINE

## 2024-02-16 PROCEDURE — 1160F RVW MEDS BY RX/DR IN RCRD: CPT | Mod: HCNC,CPTII,S$GLB, | Performed by: INTERNAL MEDICINE

## 2024-02-16 PROCEDURE — 3008F BODY MASS INDEX DOCD: CPT | Mod: HCNC,CPTII,S$GLB, | Performed by: INTERNAL MEDICINE

## 2024-02-16 PROCEDURE — 3080F DIAST BP >= 90 MM HG: CPT | Mod: HCNC,CPTII,S$GLB, | Performed by: INTERNAL MEDICINE

## 2024-02-16 PROCEDURE — 25000003 PHARM REV CODE 250: Mod: HCNC | Performed by: NURSE PRACTITIONER

## 2024-02-16 PROCEDURE — 99999 PR PBB SHADOW E&M-EST. PATIENT-LVL V: CPT | Mod: PBBFAC,HCNC,, | Performed by: INTERNAL MEDICINE

## 2024-02-16 PROCEDURE — 96365 THER/PROPH/DIAG IV INF INIT: CPT | Mod: HCNC

## 2024-02-16 PROCEDURE — 99214 OFFICE O/P EST MOD 30 MIN: CPT | Mod: HCNC,S$GLB,, | Performed by: INTERNAL MEDICINE

## 2024-02-16 PROCEDURE — 3044F HG A1C LEVEL LT 7.0%: CPT | Mod: HCNC,CPTII,S$GLB, | Performed by: INTERNAL MEDICINE

## 2024-02-16 PROCEDURE — 1159F MED LIST DOCD IN RCRD: CPT | Mod: HCNC,CPTII,S$GLB, | Performed by: INTERNAL MEDICINE

## 2024-02-16 RX ORDER — HEPARIN 100 UNIT/ML
500 SYRINGE INTRAVENOUS
Status: CANCELLED | OUTPATIENT
Start: 2024-02-20

## 2024-02-16 RX ORDER — SODIUM CHLORIDE 0.9 % (FLUSH) 0.9 %
10 SYRINGE (ML) INJECTION
Status: CANCELLED | OUTPATIENT
Start: 2024-02-20

## 2024-02-16 RX ADMIN — SODIUM CHLORIDE 1000 ML: 0.9 INJECTION, SOLUTION INTRAVENOUS at 08:02

## 2024-02-16 NOTE — PLAN OF CARE
Patient counseled on monitoring and reporting s/s of fluid deficit to provider - verbalized understanding

## 2024-02-18 NOTE — PROGRESS NOTES
History of present illness:  73-year-old gentleman who has been followed in the rheumatology department since 2004. He was originally seen by Dr. Darby and then in 2009 he was seen by Dr. Steward.  I have been following him since 2010. He has chronic gouty arthritis.  He has been on allopurinol 450 mg daily.  He has a history of osteoarthritis, especially of his hands.  He also has ulcerative colitis and has had previous surgical procedures.  He has frequent hospitalizations for dehydration.  He was last seen 1 year ago.  He was off allopurinol but has been back on it since then a remains on 450 mg daily.      He has been having some pain on the top of his foot.  He had tried colchicine with some relief.  He has some pain going up the entire leg.  He has been having some swelling.  He would tried topical medications but has not used her recently he was on gabapentin 600 mg twice daily.  He cut it down to once daily in his not noticed any difference.  He continues to have problems with dehydration.  His kidney function has been worsening.    Physical examination:  Musculoskeletal:  Cervical spine is unremarkable.    He is decreased range of motion of the shoulders bilaterally.  Elbows and wrists are unremarkable.    He has bony hypertrophy of the PIP in D IP of the right hand.  He has no synovitis.  Hips and knees are unremarkable.    He has tenderness of the left ankle.  He has no soft tissue swelling.  He has tenderness of the dorsum of the left foot.  He has pain on inversion of the left ankle.  He has bilateral bunion deformities.    Radiology: He has evidence of osteoarthritis of the foot.  He has no erosive changes.    Assessment:  1.  Inter critical gout  2. He is foot pain is most likely due to osteoarthritis     Plans:  1.  Laboratory studies obtained  2. I have referred him to Podiatry because of his foot problem  3.  Continue allopurinol 450 mg  4. I recommend the use of Voltaren gel  5. Discontinue  gabapentin since it does not seem to be helping  6.  Return in 12 months

## 2024-02-20 ENCOUNTER — INFUSION (OUTPATIENT)
Dept: INFECTIOUS DISEASES | Facility: HOSPITAL | Age: 74
End: 2024-02-20
Payer: MEDICARE

## 2024-02-20 VITALS
BODY MASS INDEX: 27.2 KG/M2 | DIASTOLIC BLOOD PRESSURE: 94 MMHG | RESPIRATION RATE: 20 BRPM | HEART RATE: 77 BPM | TEMPERATURE: 99 F | OXYGEN SATURATION: 97 % | HEIGHT: 74 IN | SYSTOLIC BLOOD PRESSURE: 178 MMHG

## 2024-02-20 DIAGNOSIS — E11.22 TYPE 2 DIABETES MELLITUS WITH STAGE 3B CHRONIC KIDNEY DISEASE, WITHOUT LONG-TERM CURRENT USE OF INSULIN: Primary | ICD-10-CM

## 2024-02-20 DIAGNOSIS — N18.32 TYPE 2 DIABETES MELLITUS WITH STAGE 3B CHRONIC KIDNEY DISEASE, WITHOUT LONG-TERM CURRENT USE OF INSULIN: Primary | ICD-10-CM

## 2024-02-20 DIAGNOSIS — Z93.3 COLOSTOMY PRESENT: ICD-10-CM

## 2024-02-20 DIAGNOSIS — E86.0 DEHYDRATION: ICD-10-CM

## 2024-02-20 PROCEDURE — 96365 THER/PROPH/DIAG IV INF INIT: CPT | Mod: HCNC

## 2024-02-20 PROCEDURE — 25000003 PHARM REV CODE 250: Mod: HCNC | Performed by: NURSE PRACTITIONER

## 2024-02-20 RX ORDER — HEPARIN 100 UNIT/ML
500 SYRINGE INTRAVENOUS
Status: CANCELLED | OUTPATIENT
Start: 2024-02-23

## 2024-02-20 RX ORDER — SODIUM CHLORIDE 0.9 % (FLUSH) 0.9 %
10 SYRINGE (ML) INJECTION
Status: CANCELLED | OUTPATIENT
Start: 2024-02-23

## 2024-02-20 RX ADMIN — SODIUM CHLORIDE 1000 ML: 9 INJECTION, SOLUTION INTRAVENOUS at 08:02

## 2024-02-21 NOTE — PROGRESS NOTES
Mr. Charlton is a patient of Dr. Taylor and was last seen in clinic 3/6/2023.      Subjective:   Patient ID:  Jarrett Charlton Jr. is a 73 y.o. male who presents for follow up of Tachycardia  .     HPI:    Mr. Charlton is a 73 y.o. male with SBO (s/p colostomy), GERD, parotid mass (s/p resection), BPH, prostate CA (s/p TRUP), CKD, pleural plaquem orthostatic hypotension, HLD, DM, CAD (PCI 2017), VT, ILR (2021), SVT here for follow up.     Background:    Jarrett Charlton Jr. has a hx of SBO, s/p colostomy, GERD, parotid mass s/p resection, BPH & prostate CA s/p TURP, CKD 4, pleural plaque, orthostatic hypotension, hypertension, hyperlipidemia, DM2, CAD status post PCI to mid LAD in 2017 (LHC in 2021 showed patent stent, otherwise nonobstructive CAD), who reportedly had VT seen on Holter monitor in 4/2021 and was started on amiodarone around this time. An ILR was implanted in 5/2021, presumably for VT management purposes. Pt reportedly has had several episodes of NSVT captured since device implant (longest 36 beats), as well as episodes of AF and SVT.     Pt currently on amiodarone 200 mg daily and toprol xl 25 mg daily, in addition to midodrine. Not on DOAC.    Echo in 12/2022 showed EF 60%.    3/6/2023: Device interrogation which shows stored episodes of AF are in fact most consistent with artifact.  ECG is NSR at 69 bpm with RBBB.  In summary, Jarrett Charlton Jr. is a 72M with a reported history of VT and AF. AF episodes I have seen are most consistent with artifact.   Plan is to stop amiodarone, continue toprol xl 25 mg daily, follow-up 1 year. Remote monitoring for ILR will be performed through our clinic going forward.    Update (02/22/2024):    9/22/2023: [employer] ILR alert received for nsVT > 10 beats.  Tachy episode lasting 15 secs, ecg c/w nsVT on 9/03/2023 at 1050 am.  Ecg reviewed with Dr Hernández.  Will cont to monitor    11/7/2023: Atrial fibrillation noted during device remote check: 52 minutes on 10/26/23.   "PVCs noted.  No further AF noted past 10/26/23.  Pt asymptomatic. Per Dr. Taylor, continue to monitor.    1/31/2024: Report indicates several episodes of probable nsAT/SVT, maximum duration 18 mins 32 secs on 1/28/24; abrupt onset/offsets noted  Was seen in the hospital 12/7 for LOC at which time metoprolol was discontinued  He remains off metoprolol due to syncope and low BP, blood pressures are presently higher per patient  Taking midodrine    2/6/2024: Seen by Dr. Marrufo last week, unable to tolerate BB at this time due to hypotension.      Today he says he has episodes of extreme SOB, LH. Fatigue. BPs have been very labile. He is on midodrine. Just took one before this visit today (BP is now very high). Says if he doesn't take it he could become so hypotensive he will faint. Has had multiple syncopal episodes. No palps, CP.    Per Dr. Taylor, episodes c/w SVT or AT with aberrancy, not VT. Most "AF" episodes on loop appear to be false.    I have personally reviewed the patient's EKG today, which shows SR with RBBB/LAFB at 78bpm. AR interval is 174. QRS is 132. QTc is 501.    Relevant Cardiac Test Results:    2D Echo (9/28/2023):    Left Ventricle: The left ventricle is normal in size. Mildly increased wall thickness. There is mild concentric hypertrophy. Normal wall motion. There is normal systolic function. Ejection fraction by visual approximation is 60%. Grade I diastolic dysfunction.    Right Ventricle: Normal right ventricular cavity size. Wall thickness is normal. Right ventricle wall motion  is normal. Systolic function is normal.     Current Outpatient Medications   Medication Sig    ACCU-CHEK PAUL CONTROL SOLN Soln 1 drop by NOT APPLICABLE route once daily.    ACCU-CHEK GUIDE TEST STRIPS Strp TEST BLOOD SUGAR THREE TIMES DAILY    ACCU-CHEK SOFTCLIX LANCETS Misc TEST BLOOD SUGAR THREE TIMES DAILY    aspirin (ECOTRIN) 81 MG EC tablet Take 1 tablet (81 mg total) by mouth once daily.    blood-glucose meter " (ACCU-CHEK GUIDE GLUCOSE METER) [facility] Accucheck BID    brimonidine 0.2% (ALPHAGAN) 0.2 % Drop Place 1 drop into both eyes 2 (two) times a day.    calcium-vitamin D3 (OS-DAKOTA 500 + D3) 500 mg-5 mcg (200 unit) per tablet Take 1 tablet by mouth once daily.    capsaicin 0.1 % Crea Apply painful joint TID    cephALEXin (KEFLEX) 500 MG capsule Take 1 capsule (500 mg total) by mouth 4 (four) times daily.    cholestyramine-aspartame (QUESTRAN LIGHT) 4 gram PwPk Take 1 packet (4 g total) by mouth 3 (three) times daily with meals.    colchicine (COLCRYS) 0.6 mg tablet Take 2 then 1 one hour later prn gout can repeta in 2 weeks prn    DROPSAFE ALCOHOL PREP PADS PadM USE TOPICALLY 5 TIMES DAILY.    fludrocortisone (FLORINEF) 0.1 mg Tab Take 1 tablet (100 mcg total) by mouth once daily.    gabapentin (NEURONTIN) 100 MG capsule Take 2 capsules (200 mg total) by mouth 3 (three) times daily.    glimepiride (AMARYL) 4 MG tablet TAKE 1 TABLET TWICE DAILY BEFORE MEALS    hydrALAZINE (APRESOLINE) 25 MG tablet TAKE 1 TABLET THREE TIMES DAILY AS NEEDED FOR HIGH BLOOD PRESSURE: SYSTOLIC ABOVE 180 OR DIASTOLIC ABOVE 100.    levocetirizine (XYZAL) 5 MG tablet Take 1 tablet (5 mg total) by mouth every evening.    LIDOcaine (LIDODERM) 5 % Place 1 patch onto the skin as needed. Remove & Discard patch within 12 hours or as directed by MD    magnesium oxide (MAG-OX) 400 mg (241.3 mg magnesium) tablet Take 1 tablet (400 mg total) by mouth once daily.    metoprolol succinate (TOPROL-XL) 25 MG 24 hr tablet Take 1 tablet (25 mg total) by mouth once daily. Do not take if there is hypotension (low blood pressure)    midodrine (PROAMATINE) 10 MG tablet Take 1 tablet (10 mg total) by mouth 3 (three) times daily as needed (Hypotension).    pantoprazole (PROTONIX) 40 MG tablet Take 1 tablet (40 mg total) by mouth once daily.    pravastatin (PRAVACHOL) 40 MG tablet Take 1 tablet (40 mg total) by mouth once daily.    predniSONE (DELTASONE) 20 MG tablet Take 1  "tablet (20 mg total) by mouth 2 (two) times daily as needed (Gout Flare).    pyRIDostigmine bromide 30 mg Tab Take 30 mg by mouth every 8 (eight) hours.    sodium bicarbonate 650 MG tablet Take 1 tablet (650 mg total) by mouth once daily.     No current facility-administered medications for this visit.     Facility-Administered Medications Ordered in Other Visits   Medication    sodium chloride 0.9% in 1,000 mL infusion       Review of Systems   Constitutional: Positive for malaise/fatigue.   Cardiovascular:  Positive for near-syncope and syncope. Negative for chest pain, dyspnea on exertion, irregular heartbeat, leg swelling and palpitations.   Respiratory:  Positive for shortness of breath.    Hematologic/Lymphatic: Negative for bleeding problem.   Skin:  Negative for rash.   Musculoskeletal:  Negative for myalgias.   Gastrointestinal:  Negative for hematemesis, hematochezia and nausea.   Genitourinary:  Negative for hematuria.   Neurological:  Positive for light-headedness.   Psychiatric/Behavioral:  Negative for altered mental status.    Allergic/Immunologic: Negative for persistent infections.       Objective:          BP (!) 187/117   Pulse 89   Ht 6' 2" (1.88 m)   Wt 92.2 kg (203 lb 4.2 oz)   BMI 26.10 kg/m²     Physical Exam  Vitals and nursing note reviewed.   Constitutional:       Appearance: Normal appearance. He is well-developed.   HENT:      Head: Normocephalic.      Nose: Nose normal.   Eyes:      Pupils: Pupils are equal, round, and reactive to light.   Cardiovascular:      Rate and Rhythm: Normal rate and regular rhythm.   Pulmonary:      Effort: No respiratory distress.      Breath sounds: Normal breath sounds.   Musculoskeletal:         General: Normal range of motion.   Skin:     General: Skin is warm and dry.      Findings: No erythema.   Neurological:      Mental Status: He is alert and oriented to person, place, and time.   Psychiatric:         Speech: Speech normal.         Behavior: " Behavior normal.           Lab Results   Component Value Date     02/07/2024    K 4.1 02/07/2024    MG 1.6 11/03/2023    BUN 38 (H) 02/07/2024    CREATININE 2.7 (H) 02/07/2024    ALT 18 02/07/2024    AST 20 02/07/2024    HGB 11.5 (L) 02/07/2024    HCT 36.0 (L) 02/07/2024    TSH 0.957 10/26/2023    LDLCALC 124.4 11/27/2023       Recent Labs   Lab 04/06/22  0445 12/15/22  0958 07/12/23  1619 09/27/23  1758   INR 1.1 1.0 1.0 1.1       Assessment:     1. Atrial tachycardia    2. Bifascicular block    3. Essential hypertension    4. Paroxysmal ventricular tachycardia        Plan:     In summary, Mr. Charlton is a 73 y.o. male with SBO (s/p colostomy), GERD, parotid mass (s/p resection), BPH, prostate CA (s/p TRUP), CKD, pleural plaquem orthostatic hypotension, HLD, DM, CAD (PCI 2017), VT, ILR (2021), SVT here for follow up.   Pt with episodes of likely AAT/SVT with aberrancy vs VT.  He appears to be symptomatic (describes very sudden SOB episodes). He has been unable to take metoprolol due to hypotension. He also had one 52 minute episode of AF back in October with no recurrence of AF since. (Other AF episodes appear to be false). He is not on OAC. Discussed options.  Given hx of AF (albeit brief), he would not likely be a good candidate for low dose flecainide without AV radha blocking agent. Better candidate for EP study and possible RFA of SVT. (Check for inducible VT at that time?). Pt is eager to proceed. Will discuss with Dr. Taylor.     Discuss EP study/RFA with Dr. Taylor (UPDATE: WIll proceed)  RTC as scheduled    *A copy of this note has been sent to Dr. Taylor*    Follow up as scheduled.    ------------------------------------------------------------------    MEGHA Lozano, NP-C  Cardiac Electrophysiology

## 2024-02-22 ENCOUNTER — OFFICE VISIT (OUTPATIENT)
Dept: ELECTROPHYSIOLOGY | Facility: CLINIC | Age: 74
End: 2024-02-22
Payer: MEDICARE

## 2024-02-22 ENCOUNTER — HOSPITAL ENCOUNTER (OUTPATIENT)
Dept: CARDIOLOGY | Facility: CLINIC | Age: 74
Discharge: HOME | End: 2024-02-22
Payer: MEDICARE

## 2024-02-22 VITALS
DIASTOLIC BLOOD PRESSURE: 117 MMHG | HEART RATE: 89 BPM | HEIGHT: 74 IN | WEIGHT: 203.25 LBS | BODY MASS INDEX: 26.08 KG/M2 | SYSTOLIC BLOOD PRESSURE: 187 MMHG

## 2024-02-22 DIAGNOSIS — I49.8 OTHER CARDIAC ARRHYTHMIA: ICD-10-CM

## 2024-02-22 DIAGNOSIS — I10 ESSENTIAL HYPERTENSION: ICD-10-CM

## 2024-02-22 DIAGNOSIS — I47.19 ATRIAL TACHYCARDIA: Primary | ICD-10-CM

## 2024-02-22 DIAGNOSIS — I45.2 BIFASCICULAR BLOCK: ICD-10-CM

## 2024-02-22 DIAGNOSIS — I47.29 PAROXYSMAL VENTRICULAR TACHYCARDIA: ICD-10-CM

## 2024-02-22 LAB
OHS QRS DURATION: 132 MS
OHS QTC CALCULATION: 501 MS

## 2024-02-22 PROCEDURE — 3044F HG A1C LEVEL LT 7.0%: CPT | Mod: HCNC,CPTII,S$GLB, | Performed by: NURSE PRACTITIONER

## 2024-02-22 PROCEDURE — 1101F PT FALLS ASSESS-DOCD LE1/YR: CPT | Mod: HCNC,CPTII,S$GLB, | Performed by: NURSE PRACTITIONER

## 2024-02-22 PROCEDURE — 3077F SYST BP >= 140 MM HG: CPT | Mod: HCNC,CPTII,S$GLB, | Performed by: NURSE PRACTITIONER

## 2024-02-22 PROCEDURE — 99999 PR PBB SHADOW E&M-EST. PATIENT-LVL IV: CPT | Mod: PBBFAC,HCNC,, | Performed by: NURSE PRACTITIONER

## 2024-02-22 PROCEDURE — 3288F FALL RISK ASSESSMENT DOCD: CPT | Mod: HCNC,CPTII,S$GLB, | Performed by: NURSE PRACTITIONER

## 2024-02-22 PROCEDURE — 93005 ELECTROCARDIOGRAM TRACING: CPT | Mod: HCNC,S$GLB,, | Performed by: INTERNAL MEDICINE

## 2024-02-22 PROCEDURE — 99214 OFFICE O/P EST MOD 30 MIN: CPT | Mod: HCNC,S$GLB,, | Performed by: NURSE PRACTITIONER

## 2024-02-22 PROCEDURE — 1126F AMNT PAIN NOTED NONE PRSNT: CPT | Mod: HCNC,CPTII,S$GLB, | Performed by: NURSE PRACTITIONER

## 2024-02-22 PROCEDURE — 93010 ELECTROCARDIOGRAM REPORT: CPT | Mod: HCNC,S$GLB,, | Performed by: INTERNAL MEDICINE

## 2024-02-22 PROCEDURE — 3008F BODY MASS INDEX DOCD: CPT | Mod: HCNC,CPTII,S$GLB, | Performed by: NURSE PRACTITIONER

## 2024-02-22 PROCEDURE — 3080F DIAST BP >= 90 MM HG: CPT | Mod: HCNC,CPTII,S$GLB, | Performed by: NURSE PRACTITIONER

## 2024-02-22 PROCEDURE — 1159F MED LIST DOCD IN RCRD: CPT | Mod: HCNC,CPTII,S$GLB, | Performed by: NURSE PRACTITIONER

## 2024-02-22 PROCEDURE — 1160F RVW MEDS BY RX/DR IN RCRD: CPT | Mod: HCNC,CPTII,S$GLB, | Performed by: NURSE PRACTITIONER

## 2024-02-22 NOTE — Clinical Note
Pt with likely SVT/AT (vs VT) on ILR (+one episode of possible AF in Oct, other episodes of AF false). He is symptomatic (very SOB during episodes). Unable to tolerate any metoprolol - becomes very hypotensive/syncopal. He would like EP study/RFA if possible. Thx.

## 2024-02-23 ENCOUNTER — OFFICE VISIT (OUTPATIENT)
Dept: NEPHROLOGY | Facility: CLINIC | Age: 74
End: 2024-02-23
Payer: MEDICARE

## 2024-02-23 ENCOUNTER — INFUSION (OUTPATIENT)
Dept: INFECTIOUS DISEASES | Facility: HOSPITAL | Age: 74
End: 2024-02-23
Payer: MEDICARE

## 2024-02-23 VITALS
SYSTOLIC BLOOD PRESSURE: 178 MMHG | OXYGEN SATURATION: 97 % | HEIGHT: 74 IN | TEMPERATURE: 32 F | BODY MASS INDEX: 26.3 KG/M2 | RESPIRATION RATE: 18 BRPM | DIASTOLIC BLOOD PRESSURE: 92 MMHG | HEART RATE: 69 BPM | WEIGHT: 204.94 LBS

## 2024-02-23 VITALS
WEIGHT: 205.25 LBS | HEART RATE: 82 BPM | OXYGEN SATURATION: 99 % | DIASTOLIC BLOOD PRESSURE: 91 MMHG | SYSTOLIC BLOOD PRESSURE: 149 MMHG | BODY MASS INDEX: 26.35 KG/M2

## 2024-02-23 DIAGNOSIS — E87.20 METABOLIC ACIDOSIS: ICD-10-CM

## 2024-02-23 DIAGNOSIS — N25.81 SECONDARY HYPERPARATHYROIDISM: ICD-10-CM

## 2024-02-23 DIAGNOSIS — Z93.3 COLOSTOMY PRESENT: ICD-10-CM

## 2024-02-23 DIAGNOSIS — I10 ESSENTIAL HYPERTENSION: ICD-10-CM

## 2024-02-23 DIAGNOSIS — N18.4 CKD (CHRONIC KIDNEY DISEASE), STAGE IV: Primary | ICD-10-CM

## 2024-02-23 DIAGNOSIS — N18.32 TYPE 2 DIABETES MELLITUS WITH STAGE 3B CHRONIC KIDNEY DISEASE, WITHOUT LONG-TERM CURRENT USE OF INSULIN: Primary | ICD-10-CM

## 2024-02-23 DIAGNOSIS — E86.0 DEHYDRATION: ICD-10-CM

## 2024-02-23 DIAGNOSIS — E11.22 TYPE 2 DIABETES MELLITUS WITH STAGE 3B CHRONIC KIDNEY DISEASE, WITHOUT LONG-TERM CURRENT USE OF INSULIN: Primary | ICD-10-CM

## 2024-02-23 LAB
OHS CV AF BURDEN PERCENT: 1.9
OHS CV DC REMOTE DEVICE TYPE: NORMAL
OHS CV ICD SHOCK: NO

## 2024-02-23 PROCEDURE — 25000003 PHARM REV CODE 250: Mod: HCNC | Performed by: NURSE PRACTITIONER

## 2024-02-23 PROCEDURE — 3288F FALL RISK ASSESSMENT DOCD: CPT | Mod: HCNC,CPTII,S$GLB, | Performed by: STUDENT IN AN ORGANIZED HEALTH CARE EDUCATION/TRAINING PROGRAM

## 2024-02-23 PROCEDURE — 99999 PR PBB SHADOW E&M-EST. PATIENT-LVL IV: CPT | Mod: PBBFAC,HCNC,, | Performed by: STUDENT IN AN ORGANIZED HEALTH CARE EDUCATION/TRAINING PROGRAM

## 2024-02-23 PROCEDURE — 1160F RVW MEDS BY RX/DR IN RCRD: CPT | Mod: HCNC,CPTII,S$GLB, | Performed by: STUDENT IN AN ORGANIZED HEALTH CARE EDUCATION/TRAINING PROGRAM

## 2024-02-23 PROCEDURE — 1126F AMNT PAIN NOTED NONE PRSNT: CPT | Mod: HCNC,CPTII,S$GLB, | Performed by: STUDENT IN AN ORGANIZED HEALTH CARE EDUCATION/TRAINING PROGRAM

## 2024-02-23 PROCEDURE — 99417 PROLNG OP E/M EACH 15 MIN: CPT | Mod: HCNC,S$GLB,, | Performed by: STUDENT IN AN ORGANIZED HEALTH CARE EDUCATION/TRAINING PROGRAM

## 2024-02-23 PROCEDURE — 96365 THER/PROPH/DIAG IV INF INIT: CPT | Mod: HCNC

## 2024-02-23 PROCEDURE — 3044F HG A1C LEVEL LT 7.0%: CPT | Mod: HCNC,CPTII,S$GLB, | Performed by: STUDENT IN AN ORGANIZED HEALTH CARE EDUCATION/TRAINING PROGRAM

## 2024-02-23 PROCEDURE — 1159F MED LIST DOCD IN RCRD: CPT | Mod: HCNC,CPTII,S$GLB, | Performed by: STUDENT IN AN ORGANIZED HEALTH CARE EDUCATION/TRAINING PROGRAM

## 2024-02-23 PROCEDURE — G2211 COMPLEX E/M VISIT ADD ON: HCPCS | Mod: HCNC,S$GLB,, | Performed by: STUDENT IN AN ORGANIZED HEALTH CARE EDUCATION/TRAINING PROGRAM

## 2024-02-23 PROCEDURE — 1101F PT FALLS ASSESS-DOCD LE1/YR: CPT | Mod: HCNC,CPTII,S$GLB, | Performed by: STUDENT IN AN ORGANIZED HEALTH CARE EDUCATION/TRAINING PROGRAM

## 2024-02-23 PROCEDURE — 99215 OFFICE O/P EST HI 40 MIN: CPT | Mod: HCNC,S$GLB,, | Performed by: STUDENT IN AN ORGANIZED HEALTH CARE EDUCATION/TRAINING PROGRAM

## 2024-02-23 PROCEDURE — 3077F SYST BP >= 140 MM HG: CPT | Mod: HCNC,CPTII,S$GLB, | Performed by: STUDENT IN AN ORGANIZED HEALTH CARE EDUCATION/TRAINING PROGRAM

## 2024-02-23 PROCEDURE — 3008F BODY MASS INDEX DOCD: CPT | Mod: HCNC,CPTII,S$GLB, | Performed by: STUDENT IN AN ORGANIZED HEALTH CARE EDUCATION/TRAINING PROGRAM

## 2024-02-23 PROCEDURE — 3080F DIAST BP >= 90 MM HG: CPT | Mod: HCNC,CPTII,S$GLB, | Performed by: STUDENT IN AN ORGANIZED HEALTH CARE EDUCATION/TRAINING PROGRAM

## 2024-02-23 PROCEDURE — 3066F NEPHROPATHY DOC TX: CPT | Mod: HCNC,CPTII,S$GLB, | Performed by: STUDENT IN AN ORGANIZED HEALTH CARE EDUCATION/TRAINING PROGRAM

## 2024-02-23 RX ORDER — HEPARIN 100 UNIT/ML
500 SYRINGE INTRAVENOUS
Status: CANCELLED | OUTPATIENT
Start: 2024-02-27

## 2024-02-23 RX ORDER — SODIUM BICARBONATE 650 MG/1
1300 TABLET ORAL 2 TIMES DAILY
Qty: 360 TABLET | Refills: 3 | Status: SHIPPED | OUTPATIENT
Start: 2024-02-23 | End: 2025-02-22

## 2024-02-23 RX ORDER — SODIUM CHLORIDE 0.9 % (FLUSH) 0.9 %
10 SYRINGE (ML) INJECTION
Status: CANCELLED | OUTPATIENT
Start: 2024-02-27

## 2024-02-23 RX ADMIN — SODIUM CHLORIDE 1000 ML: 9 INJECTION, SOLUTION INTRAVENOUS at 08:02

## 2024-02-23 NOTE — PROGRESS NOTES
Nephrology Clinic Note   2/23/2024    Chief Complaint   Patient presents with    Chronic Renal Failure      History of Present Illness    Patient, previously seen by another provider, presents for continuation of care.    The patient reports dissatisfaction with his previous provider due to misunderstanding when he made a statement about making serious mistakes while working on his car, which was interpreted as suicidal ideation, leading to an involuntary admission to a mental health facility for five days. He shows frustration about this incident and subsequent change in care providers.    He is aware that his white blood cell count is within normal range and has been stable for years. His RBC count (hemoglobin) is slightly low but consistent.    He understands that his kidney function fluctuates and it is currently reported at 24%. He is aware of minimal protein levels in his urine.    His complex regimen involves several medications and adjusting to symptoms such as dizziness with Midodrine. His blood pressure levels frequently reach high measures in the 180s or 200s. Following his cardiologist's advice, he monitors his blood pressure's natural fluctuation, and takes Midodrine when his blood pressure drops to around 118, leading to dizziness.    He takes sodium bicarbonate daily without reporting any intolerance or side effects. His pain and swelling in the legs are managed using Prednisone around once weekly as needed or during intense flare-ups of gout. He has been off his prescribed colchicine for the last two weeks.    Following the advice of his cardiologist, he regularly consumes a mix of Gatorade and water mainly as a preventive measure due to kidney concerns and diarrhea instances. Despite his frequent fluid intake, patient denies experiencing thirst.    He agrees to measure his ostomy output for enhanced tracking for future consultations. He reports no changes in urinary frequency, urgency or other  urinary symptoms.  ROS:  Constitutional: +dizziness  Genitourinary: -frequency, -urgency  Musculoskeletal: +limb swelling  Endocrine: -excessive thirst       Presents to the clinic today for medical conditions listed below.  Problem Noted   Ckd (Chronic Kidney Disease), Stage IV 5/23/2013    worsening  stage G4A1 likely secondary to chronic hypertension and diabetes follow more recently last 2 years by repeated GRADY from high output ostomy  baseline creatinine 2.6-3  baseline UPCR 0  historic complications Metabolic acidosis  BP uncontrolled on Hydralazine, Diuretic, and midodrine when it get too low, fludrocortisone (I am leaving on these meds not because I think they are indicated, but because this care plan has been established by multiple providers prior to my single visit with him)  glucose controlled on Sulfonylurea  UPCR controlled on no specific anti proteinuric medication  Native kidney US 2022 10.7 cm bilateral with a few sub 3cm simple cysts on the left  current diuretic therapy: none  current bicarbonate therapy: increased to 1300 bid   current CKD-MBD therapy: none  current anemia therapy: none    Recently referred here by ESTUARDO due to increasing complexity both medically and socially. With such a complex regimen of medicines for his orthostatic hypotension and my lack of understanding of why each medicine is used for him, I discussed with him and have opted to not change any of them. It appears his cardiologist is managing them. While I may do things differently, this care plan has been established, has been working somewhat for the last year or so, and I would prefer a single provider manage his blood pressure.  I do want to clarify and actually quantify his ostomy output because when I discuss with him he reports it not being that significant. He says he will measure it for me.  I explained that managing his decline in kidney function relies on managing his repeated GRADY. Explained that his kidney  function is not good and his risk for dialysis in next 5-10 years is high     Stage 4 Chronic Kidney Disease (Resolved) 2/11/2021    Avoid nephrotoxic drug and NSAIDs         Physical Exam    General: Well-developed. Well-appearing.  Cardiovascular: Regular rate. Regular rhythm. Heart rate normal.  Lungs: Normal respiratory effort. No respiratory distress. Crackles noted in right lung.  Extremities: No edema lower extremities.  Psychiatric: Alert.         Assessment:    1. CKD (chronic kidney disease), stage IV    2. Secondary hyperparathyroidism    3. Essential hypertension    4. Metabolic acidosis        Plan:    Assessment & Plan    M10.9 Gout, unspecified  R55 Syncope and collapse  E87.5 Hyperkalemia  E87.6 Hypokalemia  ELECTROLYTE IMBALANCE:  - Adjusted the patient's sodium bicarbonate dosage from 1 pill daily to 2 pills twice daily.  LAB WORK:  - Ordered lab work, including labs and urinalysis, to be conducted at [facility].  KIDNEY FUNCTION MONITORING:  - Emphasized the importance of monitoring fluid intake and output to assess kidney function.  - Suggested the patient measure the output from their ostomy bag for 24 hours to better understand fluid balance.  CARDIAC HEALTH:  - Advised the patient to continue the water and Gatorade mixture as recommended by the cardiologist.  PATIENT EDUCATION:  - Informed the patient about orthopnea and its potential impact on kidney function.         Jarrett was seen today for chronic renal failure.    Diagnoses and all orders for this visit:    CKD (chronic kidney disease), stage IV  -     Sodium, Random Urine; Future  -     Microalbumin/Creatinine Ratio, Urine; Future  -     Ferritin; Future  -     Iron and TIBC; Future  -     PTH, Intact; Future  -     Vitamin D; Future  -     Phosphorus; Future  -     Magnesium; Future  -     Comprehensive Metabolic Panel; Future  -     CBC Auto Differential; Future  -     Urinalysis; Future    Secondary hyperparathyroidism  -      PTH, Intact; Future  -     Vitamin D; Future  -     Phosphorus; Future  -     Magnesium; Future    Essential hypertension    Metabolic acidosis  -     sodium bicarbonate 650 MG tablet; Take 2 tablets (1,300 mg total) by mouth 2 (two) times daily.        Follow up in about 4 months (around 6/23/2024).     I spent a total of 81 minutes on the day of the visit.    Visit today included increased complexity associated with the care of the episodic problem CKD addressed and managing the longitudinal care of the patient due to the serious and/or complex managed problem(s) CKD, HTN, secondary hyperparathyroidism, and anemia of chronic disease.  Orders Placed This Encounter   Procedures    Sodium, Random Urine    Microalbumin/Creatinine Ratio, Urine    Ferritin    Iron and TIBC    PTH, Intact    Vitamin D    Phosphorus    Magnesium    Comprehensive Metabolic Panel    CBC Auto Differential    Urinalysis     Medications Discontinued During This Encounter   Medication Reason    sodium bicarbonate 650 MG tablet       Future Appointments   Date Time Provider Department Center   2/27/2024  8:00 AM INFUSION, CHAIR 2 [facility]   3/1/2024  8:00 AM INFUSION, CHAIR 2 [facility]   3/5/2024  8:00 AM INFUSION, CHAIR 1 [facility]   3/8/2024  8:00 AM INFUSION, CHAIR 10 [facility]   3/12/2024  8:00 AM INFUSION, CHAIR 10 [facility]   3/15/2024  8:00 AM INFUSION, CHAIR 1 [facility]   3/19/2024  8:00 AM INFUSION, CHAIR 3 [facility]   3/22/2024  8:00 AM INFUSION, CHAIR 9 [facility]   3/26/2024  8:00 AM INFUSION, CHAIR 2 [facility]   3/28/2024  8:00 AM INFUSION, CHAIR 6 [facility]   4/2/2024  8:00 AM INFUSION, CHAIR 6 [facility]   4/5/2024  8:30 AM INFUSION, CHAIR 5 [facility]   4/9/2024  8:00 AM INFUSION, CHAIR 6 [facility]   4/12/2024  8:00 AM  INFUSION, CHAIR 5 [facility]   4/18/2024  8:00 AM Alexandrea Purdy, NICKY [facility] DIMGNT [facility]   4/19/2024  8:00 AM Laine Alvarez MD [facility] ENDOCR [place]   4/26/2024 10:30 AM Jean Claude Logan Jr., MD [facility] NEPHRO [facility]   4/30/2024 10:45 AM Poli Gonzalez MD [facility] PRCAR [facility]   5/2/2024 11:00 AM Aj Marrufo MD [facility] CARDIO [facility]   5/10/2024  8:00 AM INFUSION, CHAIR 1 [facility] AMB [facility]F [facility]   5/10/2024 10:40 AM Catarino Mota MD [facility] UROLOGY [facility]   6/10/2024  8:00 AM INFUSION, CHAIR 10 [facility] AMB INF [facility]       This note was generated with the assistance of ambient listening technology. Verbal consent was obtained by the patient and accompanying visitor(s) for the recording of patient appointment to facilitate this note. I attest to having reviewed and edited the generated note for accuracy, though some syntax or spelling errors may persist. Please contact the author of this note for any clarification.      Jean Claude Logan

## 2024-02-26 DIAGNOSIS — D50.9 IRON DEFICIENCY ANEMIA, UNSPECIFIED IRON DEFICIENCY ANEMIA TYPE: Primary | ICD-10-CM

## 2024-02-26 RX ORDER — FERROUS SULFATE 325(65) MG
325 TABLET ORAL EVERY OTHER DAY
Qty: 15 TABLET | Refills: 11 | Status: SHIPPED | OUTPATIENT
Start: 2024-02-26 | End: 2025-02-25

## 2024-02-27 ENCOUNTER — INFUSION (OUTPATIENT)
Dept: INFECTIOUS DISEASES | Facility: HOSPITAL | Age: 74
End: 2024-02-27
Attending: NURSE PRACTITIONER
Payer: MEDICARE

## 2024-02-27 VITALS
SYSTOLIC BLOOD PRESSURE: 197 MMHG | RESPIRATION RATE: 18 BRPM | DIASTOLIC BLOOD PRESSURE: 97 MMHG | TEMPERATURE: 99 F | HEIGHT: 74 IN | WEIGHT: 205.38 LBS | BODY MASS INDEX: 26.36 KG/M2 | OXYGEN SATURATION: 97 % | HEART RATE: 73 BPM

## 2024-02-27 DIAGNOSIS — E11.22 TYPE 2 DIABETES MELLITUS WITH STAGE 3B CHRONIC KIDNEY DISEASE, WITHOUT LONG-TERM CURRENT USE OF INSULIN: Primary | ICD-10-CM

## 2024-02-27 DIAGNOSIS — N18.32 TYPE 2 DIABETES MELLITUS WITH STAGE 3B CHRONIC KIDNEY DISEASE, WITHOUT LONG-TERM CURRENT USE OF INSULIN: Primary | ICD-10-CM

## 2024-02-27 DIAGNOSIS — Z93.3 COLOSTOMY PRESENT: ICD-10-CM

## 2024-02-27 DIAGNOSIS — E86.0 DEHYDRATION: ICD-10-CM

## 2024-02-27 PROCEDURE — 25000003 PHARM REV CODE 250: Mod: HCNC | Performed by: NURSE PRACTITIONER

## 2024-02-27 PROCEDURE — 96360 HYDRATION IV INFUSION INIT: CPT | Mod: HCNC

## 2024-02-27 RX ORDER — HEPARIN 100 UNIT/ML
500 SYRINGE INTRAVENOUS
Status: CANCELLED | OUTPATIENT
Start: 2024-03-01

## 2024-02-27 RX ORDER — HEPARIN 100 UNIT/ML
500 SYRINGE INTRAVENOUS
Status: DISCONTINUED | OUTPATIENT
Start: 2024-02-27 | End: 2024-02-27 | Stop reason: HOSPADM

## 2024-02-27 RX ORDER — SODIUM CHLORIDE 0.9 % (FLUSH) 0.9 %
10 SYRINGE (ML) INJECTION
Status: CANCELLED | OUTPATIENT
Start: 2024-03-01

## 2024-02-27 RX ORDER — SODIUM CHLORIDE 0.9 % (FLUSH) 0.9 %
10 SYRINGE (ML) INJECTION
Status: DISCONTINUED | OUTPATIENT
Start: 2024-02-27 | End: 2024-02-27 | Stop reason: HOSPADM

## 2024-02-27 RX ADMIN — SODIUM CHLORIDE 1000 ML: 9 INJECTION, SOLUTION INTRAVENOUS at 08:02

## 2024-02-27 NOTE — PROGRESS NOTES
Patient received 1L NS bolus over 1 hr as ordered. Patient tolerating well without difficulty. Return appointment provided.     Limited head-to-toe assessment due to privacy issues and visit reason though the opportunity was given for patient to express any concerns.

## 2024-02-29 ENCOUNTER — TELEPHONE (OUTPATIENT)
Dept: CARDIOLOGY | Facility: CLINIC | Age: 74
End: 2024-02-29
Payer: MEDICARE

## 2024-02-29 NOTE — TELEPHONE ENCOUNTER
Next appt 05/02/2024  LOV 12/28/2023    Spoke with patient, reports blood pressure:    Last night - /89  3:00 AM - BP 88/71 took a pill at 4:00 AM  9:00 AM - BP 92/53     Shortness of breathe, stopped hydralazine

## 2024-02-29 NOTE — TELEPHONE ENCOUNTER
----- Message from Yumiko Brown sent at 2/29/2024  9:25 AM CST -----  Regarding: llow blood pressure & shortness of breath  Contact:  [phone]  The patient called requesting to speak to Nurse. States having low blood pressure and shortness of breath is requesting call back for advice. I reached out to oncall nurse but PT hung up on hold  Please call to advise at your earliest opportunity  No further information provided    Patient can be contacted @# [phone]

## 2024-03-01 ENCOUNTER — INFUSION (OUTPATIENT)
Dept: INFECTIOUS DISEASES | Facility: HOSPITAL | Age: 74
End: 2024-03-01
Payer: MEDICARE

## 2024-03-01 VITALS
HEIGHT: 74 IN | DIASTOLIC BLOOD PRESSURE: 97 MMHG | BODY MASS INDEX: 25.9 KG/M2 | RESPIRATION RATE: 19 BRPM | TEMPERATURE: 99 F | HEART RATE: 89 BPM | OXYGEN SATURATION: 95 % | WEIGHT: 201.81 LBS | SYSTOLIC BLOOD PRESSURE: 170 MMHG

## 2024-03-01 DIAGNOSIS — N18.32 TYPE 2 DIABETES MELLITUS WITH STAGE 3B CHRONIC KIDNEY DISEASE, WITHOUT LONG-TERM CURRENT USE OF INSULIN: Primary | ICD-10-CM

## 2024-03-01 DIAGNOSIS — E86.0 DEHYDRATION: ICD-10-CM

## 2024-03-01 DIAGNOSIS — Z93.3 COLOSTOMY PRESENT: ICD-10-CM

## 2024-03-01 DIAGNOSIS — E11.22 TYPE 2 DIABETES MELLITUS WITH STAGE 3B CHRONIC KIDNEY DISEASE, WITHOUT LONG-TERM CURRENT USE OF INSULIN: Primary | ICD-10-CM

## 2024-03-01 PROCEDURE — 96365 THER/PROPH/DIAG IV INF INIT: CPT | Mod: HCNC

## 2024-03-01 PROCEDURE — 25000003 PHARM REV CODE 250: Mod: HCNC | Performed by: NURSE PRACTITIONER

## 2024-03-01 RX ORDER — HEPARIN 100 UNIT/ML
500 SYRINGE INTRAVENOUS
Status: CANCELLED | OUTPATIENT
Start: 2024-03-05

## 2024-03-01 RX ORDER — SODIUM CHLORIDE 0.9 % (FLUSH) 0.9 %
10 SYRINGE (ML) INJECTION
Status: CANCELLED | OUTPATIENT
Start: 2024-03-05

## 2024-03-01 RX ADMIN — SODIUM CHLORIDE 1000 ML: 9 INJECTION, SOLUTION INTRAVENOUS at 08:03

## 2024-03-03 PROBLEM — J18.9 PNEUMONIA DUE TO INFECTIOUS ORGANISM: Status: ACTIVE | Noted: 2024-03-03

## 2024-03-04 ENCOUNTER — TELEPHONE (OUTPATIENT)
Dept: CARDIOLOGY | Facility: CLINIC | Age: 74
End: 2024-03-04
Payer: MEDICARE

## 2024-03-04 NOTE — TELEPHONE ENCOUNTER
Spoke with patient, discharged from hospital yesterday.  He had a device check last month.  He was told that his heart is not firing correctly. He has had increased shortness of breath.  He also said that he was told he needs a procedure - not sure what it is.

## 2024-03-04 NOTE — TELEPHONE ENCOUNTER
----- Message from Yumiko Brown sent at 3/4/2024 10:08 AM CST -----  Regarding: advice - pt req return call today  Contact:  [phone]  The patient called requesting to speak to Nurse Webster. He is calling requesting to find out what is going on with him and what doctor wants to do. Please call to advise at your earliest opportunity  Please call today, pt st    No further information provided      Patient can be contacted @# [phone]

## 2024-03-05 ENCOUNTER — INFUSION (OUTPATIENT)
Dept: INFECTIOUS DISEASES | Facility: HOSPITAL | Age: 74
End: 2024-03-05
Payer: MEDICARE

## 2024-03-05 ENCOUNTER — OFFICE VISIT (OUTPATIENT)
Dept: CARDIOLOGY | Facility: CLINIC | Age: 74
End: 2024-03-05
Payer: MEDICARE

## 2024-03-05 VITALS
SYSTOLIC BLOOD PRESSURE: 198 MMHG | HEIGHT: 74 IN | HEART RATE: 82 BPM | BODY MASS INDEX: 26.38 KG/M2 | WEIGHT: 205.56 LBS | TEMPERATURE: 99 F | RESPIRATION RATE: 16 BRPM | OXYGEN SATURATION: 97 % | DIASTOLIC BLOOD PRESSURE: 118 MMHG

## 2024-03-05 VITALS
HEIGHT: 74 IN | BODY MASS INDEX: 26.49 KG/M2 | WEIGHT: 206.38 LBS | DIASTOLIC BLOOD PRESSURE: 102 MMHG | SYSTOLIC BLOOD PRESSURE: 181 MMHG | HEART RATE: 84 BPM | OXYGEN SATURATION: 97 %

## 2024-03-05 DIAGNOSIS — E11.42 TYPE 2 DIABETES MELLITUS WITH DIABETIC POLYNEUROPATHY, WITHOUT LONG-TERM CURRENT USE OF INSULIN: ICD-10-CM

## 2024-03-05 DIAGNOSIS — R19.8 HIGH OUTPUT ILEOSTOMY: ICD-10-CM

## 2024-03-05 DIAGNOSIS — I47.20 VT (VENTRICULAR TACHYCARDIA): ICD-10-CM

## 2024-03-05 DIAGNOSIS — I16.0 HYPERTENSIVE URGENCY: ICD-10-CM

## 2024-03-05 DIAGNOSIS — I10 ESSENTIAL HYPERTENSION: ICD-10-CM

## 2024-03-05 DIAGNOSIS — I25.10 CORONARY ARTERY DISEASE INVOLVING NATIVE CORONARY ARTERY OF NATIVE HEART WITHOUT ANGINA PECTORIS: ICD-10-CM

## 2024-03-05 DIAGNOSIS — N18.32 TYPE 2 DIABETES MELLITUS WITH STAGE 3B CHRONIC KIDNEY DISEASE, WITHOUT LONG-TERM CURRENT USE OF INSULIN: Primary | ICD-10-CM

## 2024-03-05 DIAGNOSIS — Z93.2 ILEOSTOMY IN PLACE: ICD-10-CM

## 2024-03-05 DIAGNOSIS — I70.0 AORTIC ATHEROSCLEROSIS: ICD-10-CM

## 2024-03-05 DIAGNOSIS — E11.22 TYPE 2 DIABETES MELLITUS WITH STAGE 3B CHRONIC KIDNEY DISEASE, WITHOUT LONG-TERM CURRENT USE OF INSULIN: Primary | ICD-10-CM

## 2024-03-05 DIAGNOSIS — Z93.2 HIGH OUTPUT ILEOSTOMY: ICD-10-CM

## 2024-03-05 DIAGNOSIS — I95.1 SYNCOPE DUE TO ORTHOSTATIC HYPOTENSION: Primary | ICD-10-CM

## 2024-03-05 DIAGNOSIS — I71.21 ASCENDING AORTIC ANEURYSM, UNSPECIFIED WHETHER RUPTURED: ICD-10-CM

## 2024-03-05 DIAGNOSIS — Z87.19 HISTORY OF ULCERATIVE COLITIS: ICD-10-CM

## 2024-03-05 DIAGNOSIS — I45.2 BIFASCICULAR BLOCK: ICD-10-CM

## 2024-03-05 DIAGNOSIS — E86.0 DEHYDRATION: ICD-10-CM

## 2024-03-05 DIAGNOSIS — N18.4 CKD (CHRONIC KIDNEY DISEASE), STAGE IV: ICD-10-CM

## 2024-03-05 DIAGNOSIS — I95.9 HYPOTENSION, UNSPECIFIED HYPOTENSION TYPE: ICD-10-CM

## 2024-03-05 DIAGNOSIS — E78.2 MIXED HYPERLIPIDEMIA: ICD-10-CM

## 2024-03-05 DIAGNOSIS — Z93.3 COLOSTOMY PRESENT: ICD-10-CM

## 2024-03-05 PROCEDURE — 3077F SYST BP >= 140 MM HG: CPT | Mod: HCNC,CPTII,S$GLB, | Performed by: INTERNAL MEDICINE

## 2024-03-05 PROCEDURE — 3044F HG A1C LEVEL LT 7.0%: CPT | Mod: HCNC,CPTII,S$GLB, | Performed by: INTERNAL MEDICINE

## 2024-03-05 PROCEDURE — 99999 PR PBB SHADOW E&M-EST. PATIENT-LVL V: CPT | Mod: PBBFAC,HCNC,, | Performed by: INTERNAL MEDICINE

## 2024-03-05 PROCEDURE — 3008F BODY MASS INDEX DOCD: CPT | Mod: HCNC,CPTII,S$GLB, | Performed by: INTERNAL MEDICINE

## 2024-03-05 PROCEDURE — 3066F NEPHROPATHY DOC TX: CPT | Mod: HCNC,CPTII,S$GLB, | Performed by: INTERNAL MEDICINE

## 2024-03-05 PROCEDURE — 99214 OFFICE O/P EST MOD 30 MIN: CPT | Mod: HCNC,S$GLB,, | Performed by: INTERNAL MEDICINE

## 2024-03-05 PROCEDURE — 3080F DIAST BP >= 90 MM HG: CPT | Mod: HCNC,CPTII,S$GLB, | Performed by: INTERNAL MEDICINE

## 2024-03-05 PROCEDURE — 1125F AMNT PAIN NOTED PAIN PRSNT: CPT | Mod: HCNC,CPTII,S$GLB, | Performed by: INTERNAL MEDICINE

## 2024-03-05 PROCEDURE — 1111F DSCHRG MED/CURRENT MED MERGE: CPT | Mod: HCNC,CPTII,S$GLB, | Performed by: INTERNAL MEDICINE

## 2024-03-05 PROCEDURE — 25000003 PHARM REV CODE 250: Mod: HCNC | Performed by: NURSE PRACTITIONER

## 2024-03-05 PROCEDURE — 96360 HYDRATION IV INFUSION INIT: CPT | Mod: HCNC

## 2024-03-05 RX ORDER — HEPARIN 100 UNIT/ML
500 SYRINGE INTRAVENOUS
Status: CANCELLED | OUTPATIENT
Start: 2024-03-08

## 2024-03-05 RX ORDER — FLUDROCORTISONE ACETATE 0.1 MG/1
100 TABLET ORAL 2 TIMES DAILY
Qty: 180 TABLET | Refills: 3 | Status: SHIPPED | OUTPATIENT
Start: 2024-03-05 | End: 2024-03-22 | Stop reason: SDUPTHER

## 2024-03-05 RX ORDER — SODIUM CHLORIDE 0.9 % (FLUSH) 0.9 %
10 SYRINGE (ML) INJECTION
Status: CANCELLED | OUTPATIENT
Start: 2024-03-08

## 2024-03-05 RX ADMIN — SODIUM CHLORIDE 1000 ML: 9 INJECTION, SOLUTION INTRAVENOUS at 08:03

## 2024-03-05 NOTE — PROGRESS NOTES
Patient received 1L NS bolus over 1 hr as ordered. Patient tolerated well without difficulty. Pt states that he took Mididrine @ 0500 as directed by Cards for test this am.  Return appointment provided.     Limited head-to-toe assessment due to privacy issues and visit reason though the opportunity was given for patient to express any concerns.

## 2024-03-08 ENCOUNTER — INFUSION (OUTPATIENT)
Dept: INFECTIOUS DISEASES | Facility: HOSPITAL | Age: 74
End: 2024-03-08
Payer: MEDICARE

## 2024-03-08 VITALS
TEMPERATURE: 98 F | BODY MASS INDEX: 26.3 KG/M2 | RESPIRATION RATE: 19 BRPM | OXYGEN SATURATION: 95 % | HEIGHT: 74 IN | SYSTOLIC BLOOD PRESSURE: 171 MMHG | HEART RATE: 87 BPM | WEIGHT: 204.94 LBS | DIASTOLIC BLOOD PRESSURE: 95 MMHG

## 2024-03-08 DIAGNOSIS — Z93.3 COLOSTOMY PRESENT: ICD-10-CM

## 2024-03-08 DIAGNOSIS — N18.32 TYPE 2 DIABETES MELLITUS WITH STAGE 3B CHRONIC KIDNEY DISEASE, WITHOUT LONG-TERM CURRENT USE OF INSULIN: Primary | ICD-10-CM

## 2024-03-08 DIAGNOSIS — I49.9 CARDIAC ARRHYTHMIA, UNSPECIFIED CARDIAC ARRHYTHMIA TYPE: ICD-10-CM

## 2024-03-08 DIAGNOSIS — E11.22 TYPE 2 DIABETES MELLITUS WITH STAGE 3B CHRONIC KIDNEY DISEASE, WITHOUT LONG-TERM CURRENT USE OF INSULIN: Primary | ICD-10-CM

## 2024-03-08 DIAGNOSIS — E86.0 DEHYDRATION: ICD-10-CM

## 2024-03-08 DIAGNOSIS — I47.29 PAROXYSMAL VENTRICULAR TACHYCARDIA: Primary | ICD-10-CM

## 2024-03-08 PROCEDURE — 25000003 PHARM REV CODE 250: Mod: HCNC | Performed by: STUDENT IN AN ORGANIZED HEALTH CARE EDUCATION/TRAINING PROGRAM

## 2024-03-08 PROCEDURE — 96365 THER/PROPH/DIAG IV INF INIT: CPT | Mod: HCNC

## 2024-03-08 RX ORDER — HEPARIN 100 UNIT/ML
500 SYRINGE INTRAVENOUS
Status: CANCELLED | OUTPATIENT
Start: 2024-03-12

## 2024-03-08 RX ORDER — SODIUM CHLORIDE 0.9 % (FLUSH) 0.9 %
10 SYRINGE (ML) INJECTION
Status: CANCELLED | OUTPATIENT
Start: 2024-03-12

## 2024-03-08 RX ADMIN — SODIUM CHLORIDE 1000 ML: 9 INJECTION, SOLUTION INTRAVENOUS at 08:03

## 2024-03-08 NOTE — PROGRESS NOTES
Patient receiving 1L NS bolus over 1 hr as ordered. Patient tolerating well without difficulty.   Return appointment provided.     Limited head-to-toe assessment due to privacy issues and visit reason though the opportunity was given for patient to express any concerns.

## 2024-03-12 ENCOUNTER — INFUSION (OUTPATIENT)
Dept: INFECTIOUS DISEASES | Facility: HOSPITAL | Age: 74
End: 2024-03-12
Payer: MEDICARE

## 2024-03-12 VITALS
OXYGEN SATURATION: 97 % | TEMPERATURE: 98 F | WEIGHT: 202.38 LBS | RESPIRATION RATE: 16 BRPM | HEIGHT: 74 IN | DIASTOLIC BLOOD PRESSURE: 93 MMHG | BODY MASS INDEX: 25.97 KG/M2 | HEART RATE: 63 BPM | SYSTOLIC BLOOD PRESSURE: 135 MMHG

## 2024-03-12 DIAGNOSIS — E11.22 TYPE 2 DIABETES MELLITUS WITH STAGE 3B CHRONIC KIDNEY DISEASE, WITHOUT LONG-TERM CURRENT USE OF INSULIN: Primary | ICD-10-CM

## 2024-03-12 DIAGNOSIS — E86.0 DEHYDRATION: ICD-10-CM

## 2024-03-12 DIAGNOSIS — N18.32 TYPE 2 DIABETES MELLITUS WITH STAGE 3B CHRONIC KIDNEY DISEASE, WITHOUT LONG-TERM CURRENT USE OF INSULIN: Primary | ICD-10-CM

## 2024-03-12 DIAGNOSIS — Z93.3 COLOSTOMY PRESENT: ICD-10-CM

## 2024-03-12 PROCEDURE — 25000003 PHARM REV CODE 250: Mod: HCNC | Performed by: STUDENT IN AN ORGANIZED HEALTH CARE EDUCATION/TRAINING PROGRAM

## 2024-03-12 PROCEDURE — 96360 HYDRATION IV INFUSION INIT: CPT | Mod: HCNC

## 2024-03-12 RX ORDER — SODIUM CHLORIDE 0.9 % (FLUSH) 0.9 %
10 SYRINGE (ML) INJECTION
Status: CANCELLED | OUTPATIENT
Start: 2024-03-15

## 2024-03-12 RX ORDER — HEPARIN 100 UNIT/ML
500 SYRINGE INTRAVENOUS
Status: CANCELLED | OUTPATIENT
Start: 2024-03-15

## 2024-03-12 RX ADMIN — SODIUM CHLORIDE 1000 ML: 9 INJECTION, SOLUTION INTRAVENOUS at 08:03

## 2024-03-13 NOTE — PROGRESS NOTES
"Subjective:   Chief Complaint: Chest Pain and Shortness of Breath  Last Clinic Visit: New Patient    History of Present Illness: Jarrett Charlton Jr. is a 73 y.o. gentleman with complicated past medical history including CAD s/p PCI, labile blood pressure and orthostatic hypotension along with hypertensive urgency, hyperlipidemia, diabetes, aortic aneurysm, who presents for cardiology evaluation and follow-up, he was scheduled after a ER trip yesterday.  He reports a longstanding history of very labile blood pressures ranging from 60/40 up to 175/80.  Describes a diurnal variation, with lower blood pressures in the morning, gradually rising blood pressures over the course the day, and then blood pressure is again deployed in the evenings.  He reports that the majority of his symptoms are related to low blood pressure not high blood pressure, and reports increasing dyspnea on exertion and difficulty breathing with low blood pressure.  He has seen several different specialists including Cardiology, neurology, endocrinology, and continues to have difficulty issues with this.  Has tried Florinef, pyridostigmine possibly however unclear was recently prescribed by Neurology, and was also recently given midodrine which he reports has not helped with his low blood pressures but only resulted in higher subsequent elevated blood pressures in the evenings.  He feels frustrated.    From Dr. Marrufo's Last Note:  72-year-old male with a past medical history SBO, s/p colostomy, GERD, parotid mass s/p resection, BPH & prostate CA s/p TURP, CKD 4, pleural plaque, ("neurogenic") orthostatic hypotension, hypertension, hyperlipidemia, DM2, carotid stenosis not hemodynamically significant ultrasound 03/2023, ascending thoracic aorta 4.4 cm CT 2018 then 4.7 cm in 2023, lower extremity arterial duplex 09/2022 no evidence of hemodynamically significant stenosis nonvisualization of the peroneal arteries prior SAKSHI 2019 normal, CAD status post " PCI to mid LAD  followed by cardiac catheterization  patent stent nonobstructive disease prior MPI 2018 inferolateral ischemia, sustained ventricular tachycardia documented on discharge summary 2021 on amiodarone previous loop recorder implantation  presumed link transmission showed several episodes of ventricular tachycardia with longest episode 36 beats also documented either atrial fibrillation RVR with aberrant conduction or AV radha reentry tachycardia (this was documented by an outside provider note scan 2021), echo 2023 normal EF grade 1 diastolic dysfunction mild LVH normal RV prior echo 2022 normal EF mild LVH diastolic function indeterminate normal RV PASP 32+ CVP which could not be determined ascending aorta mildly dilated      Dx:  Labile hypertension, orthostatic hypotension  Possible autonomic insufficiency  -has tried and failed pyridostigmine, and fludrocortisone  -has seen Neurology, as well as endocrinology, as well as Cardiology  CAD s/p PCI 2017, mLAD  CKD IV, Cr fluctuates between 2-3  Remote small-bowel obstruction s/p colostomy  -history of high-output ostomy  Cholestatic jaundice  Prostate cancer s/p TURP  ascending aortic aneurysm, 4.7  -has seen Cardiothoracic surgery  NSVT, followed with by Dr. Taylor with loop recorder  Prior amiodarone use   False-positive AF on loop recorder      Medications:  Outpatient Encounter Medications as of 3/5/2024   Medication Sig Dispense Refill    aspirin (ECOTRIN) 81 MG EC tablet Take 1 tablet (81 mg total) by mouth once daily. 30 tablet 11    [] azithromycin (Z-JASE) 250 MG tablet Take 2 tablets by mouth on day 1; Take 1 tablet by mouth on days 2-5 6 tablet 0    cefdinir (OMNICEF) 300 MG capsule Take 1 capsule (300 mg total) by mouth 2 (two) times daily. for 10 days 20 capsule 0    cephALEXin (KEFLEX) 500 MG capsule Take 1 capsule (500 mg total) by mouth 4 (four) times daily. 40 capsule 0    colchicine (COLCRYS) 0.6 mg tablet  Take 2 then 1 one hour later prn gout can repeta in 2 weeks prn 30 tablet 1    ferrous sulfate (IRON) 325 mg (65 mg iron) Tab tablet Take 1 tablet (325 mg total) by mouth every other day. 15 tablet 11    glimepiride (AMARYL) 4 MG tablet TAKE 1 TABLET TWICE DAILY BEFORE MEALS 180 tablet 1    magnesium oxide (MAG-OX) 400 mg (241.3 mg magnesium) tablet Take 1 tablet (400 mg total) by mouth once daily. 30 tablet 2    midodrine (PROAMATINE) 10 MG tablet Take 1 tablet (10 mg total) by mouth 3 (three) times daily as needed (Hypotension). 270 tablet 1    pantoprazole (PROTONIX) 40 MG tablet Take 1 tablet (40 mg total) by mouth once daily. 90 tablet 1    pravastatin (PRAVACHOL) 40 MG tablet Take 1 tablet (40 mg total) by mouth once daily. 90 tablet 3    sodium bicarbonate 650 MG tablet Take 2 tablets (1,300 mg total) by mouth 2 (two) times daily. 360 tablet 3    [DISCONTINUED] fludrocortisone (FLORINEF) 0.1 mg Tab Take 1 tablet (100 mcg total) by mouth once daily. 180 tablet 0    ACCU-CHEK PAUL CONTROL SOLN Soln 1 drop by NOT APPLICABLE route once daily.      ACCU-CHEK GUIDE TEST STRIPS Strp TEST BLOOD SUGAR THREE TIMES DAILY 300 strip 10    ACCU-CHEK SOFTCLIX LANCETS Misc TEST BLOOD SUGAR THREE TIMES DAILY 300 each 3    blood-glucose meter (ACCU-CHEK GUIDE GLUCOSE METER) [facility] Accucheck BID 1 each 0    brimonidine 0.2% (ALPHAGAN) 0.2 % Drop Place 1 drop into both eyes 2 (two) times a day. 10 mL 5    calcium-vitamin D3 (OS-DAKOTA 500 + D3) 500 mg-5 mcg (200 unit) per tablet Take 1 tablet by mouth once daily. 30 tablet 1    capsaicin 0.1 % Crea Apply painful joint TID 56.6 g 1    cholestyramine-aspartame (QUESTRAN LIGHT) 4 gram PwPk Take 1 packet (4 g total) by mouth 3 (three) times daily with meals. (Patient not taking: Reported on 2/22/2024) 270 packet 1    DROPSAFE ALCOHOL PREP PADS PadM USE TOPICALLY 5 TIMES DAILY. 500 each 3    fludrocortisone (FLORINEF) 0.1 mg Tab Take 1 tablet (100 mcg total) by mouth 2 (two) times daily.  "180 tablet 3    levocetirizine (XYZAL) 5 MG tablet Take 1 tablet (5 mg total) by mouth every evening. (Patient not taking: Reported on 3/5/2024) 30 tablet 11    LIDOcaine (LIDODERM) 5 % Place 1 patch onto the skin as needed. Remove & Discard patch within 12 hours or as directed by MD 30 patch 0    predniSONE (DELTASONE) 20 MG tablet Take 1 tablet (20 mg total) by mouth 2 (two) times daily as needed (Gout Flare). (Patient not taking: Reported on 3/5/2024) 8 tablet 0    pyRIDostigmine bromide 30 mg Tab Take 30 mg by mouth every 8 (eight) hours. (Patient not taking: Reported on 3/5/2024) 90 tablet 1    [DISCONTINUED] gabapentin (NEURONTIN) 100 MG capsule Take 2 capsules (200 mg total) by mouth 3 (three) times daily. 180 capsule 1    [DISCONTINUED] hydrALAZINE (APRESOLINE) 25 MG tablet TAKE 1 TABLET THREE TIMES DAILY AS NEEDED FOR HIGH BLOOD PRESSURE: SYSTOLIC ABOVE 180 OR DIASTOLIC ABOVE 100. (Patient not taking: Reported on 3/5/2024) 90 tablet 1     Facility-Administered Encounter Medications as of 3/5/2024   Medication Dose Route Frequency Provider Last Rate Last Admin    [COMPLETED] sodium chloride 0.9% bolus 1,000 mL 1,000 mL  1,000 mL Intravenous 1 time in Clinic/HOD Bianka Arevalo NP   Stopped at 03/05/24 0925    sodium chloride 0.9% in 1,000 mL infusion   Intravenous Continuous Order, Paper         Social History:  Jarrett reports that he has never smoked. He has never used smokeless tobacco. He reports that he does not drink alcohol and does not use drugs.    Objective:   BP (!) 181/102   Pulse 84   Ht 6' 2" (1.88 m)   Wt 93.6 kg (206 lb 5.6 oz)   SpO2 97%   BMI 26.49 kg/m²     Physical Exam   Constitutional: He does not appear ill. No distress.   HENT:   Head: Normocephalic and atraumatic.   Mouth/Throat: Mucous membranes are moist.   Cardiovascular: Normal rate, regular rhythm, normal heart sounds and normal pulses. Exam reveals no gallop and no friction rub.   No murmur heard.  Pulmonary/Chest: " Effort normal and breath sounds normal. No stridor. No respiratory distress. He has no wheezes. He has no rhonchi. He has no rales. He exhibits no tenderness.   Abdominal: Normal appearance.   Musculoskeletal:      Right lower leg: No edema.      Left lower leg: No edema.   Neurological: He is alert.   Skin: Skin is warm.      EKG:  My independent visualization of most recent EKG is normal sinus rhythm, left anterior fascicular block right bundle-branch block    TTE:  09/28/2023     Left Ventricle: The left ventricle is normal in size. Mildly increased wall thickness. There is mild concentric hypertrophy. Normal wall motion. There is normal systolic function. Ejection fraction by visual approximation is 60%. Grade I diastolic dysfunction.    Right Ventricle: Normal right ventricular cavity size. Wall thickness is normal. Right ventricle wall motion  is normal. Systolic function is normal.    12/16/2022   The left ventricle is normal in size with mild concentric hypertrophy and normal systolic function.  The estimated ejection fraction is 60%. Technically difficult study. EF is based on limited views with poor endocardial border visualization.  Indeterminate left ventricular diastolic function.  Normal right ventricular size with normal right ventricular systolic function.  PASP is 32 plus central venous pressure which could not be determined.  The ascending aorta is mildly dilated.     Lipids:  Recent Labs   Lab 11/27/23  0219   LDL Cholesterol 124.4   HDL 36 L      Renal:  Recent Labs   Lab 03/06/24  0804   Creatinine 2.6 H   Potassium 3.4 L   CO2 24   BUN 43 H     Liver:  Recent Labs   Lab 03/06/24  0804   AST 23   ALT 16     LHC  04/15/2021   The pre-procedure left ventricular end diastolic pressure was 12.  The ejection fraction was greater than 55% by visual estimate.  Patent stent in the mid LAD with 20% stenosis just distal to this. IFR was point 9 3  Circumflex with 20-30% mid stenosis IFR 0.99  Normal right  coronary artery  Planned medical therapy continue amiodarone for ventricular tachycardia     11/10/2017   Mid LAD lesion 75% stenosed.  1. Severe single vessel native coronary artery disease with a 75% mid left anterior descending artery stenosis.  2 Successful PCI reducing the mid 75% LAD stenosis to 0% following placement of a 2.75/20mm Promus MARTHA. Post dilated > 14 david. JERAMY 3 flow post intervention.  3 Normal left ventricular function.   a. Ejection Fraction of 65%.   b. LVEDP of 14.    c. No AS or MR.     Assessment:     1. Syncope due to orthostatic hypotension    2. Essential hypertension    3. Hypotension, unspecified hypotension type    4. Coronary artery disease involving native coronary artery of native heart without angina pectoris    5. Mixed hyperlipidemia    6. Hypertensive urgency    7. VT (ventricular tachycardia)    8. Bifascicular block    9. Aortic atherosclerosis    10. Ascending aortic aneurysm, unspecified whether ruptured    11. CKD (chronic kidney disease), stage IV    12. Type 2 diabetes mellitus with diabetic polyneuropathy, without long-term current use of insulin    13. Ileostomy in place    14. High output ileostomy    15. History of ulcerative colitis      Plan:   1. Syncope due to orthostatic hypotension  With his extensive history unclear whether we will be able to identify a singular etiology, or immediate effective treatment plan.  Given the fact that he has been on intermittent longstanding steroid therapy, will evaluate for adrenal insufficiency with a.m. cortisol.  Will also evaluate the electrolyte effects of all of the above.  Splitting his fludrocortisone up, to take 1 dose in the morning which likely accounts for his rise in blood pressure throughout the day, and then will have him take another additional dose later in the day towards the evening which may mitigate his p.m. and a.m. drops.  Also discussed midodrine dosing strategy and possibly trying to preempt his evening  drops by taking a midodrine several hours prior.  - fludrocortisone (FLORINEF) 0.1 mg Tab; Take 1 tablet (100 mcg total) by mouth 2 (two) times daily.  Dispense: 180 tablet; Refill: 3  - CORTISOL, 8AM; Future  - Comprehensive Metabolic Panel; Future    2. Essential hypertension      3. Hypotension, unspecified hypotension type      4. Coronary artery disease involving native coronary artery of native heart without angina pectoris      5. Mixed hyperlipidemia      6. Hypertensive urgency      7. VT (ventricular tachycardia)      8. Bifascicular block      9. Aortic atherosclerosis      10. Ascending aortic aneurysm, unspecified whether ruptured      11. CKD (chronic kidney disease), stage IV      12. Type 2 diabetes mellitus with diabetic polyneuropathy, without long-term current use of insulin      13. Ileostomy in place      14. High output ileostomy      15. History of ulcerative colitis      Follow up in 3 months.    Russell Gamboa MD [facility]

## 2024-03-15 ENCOUNTER — INFUSION (OUTPATIENT)
Dept: INFECTIOUS DISEASES | Facility: HOSPITAL | Age: 74
End: 2024-03-15
Payer: MEDICARE

## 2024-03-15 VITALS
OXYGEN SATURATION: 98 % | RESPIRATION RATE: 18 BRPM | TEMPERATURE: 98 F | BODY MASS INDEX: 26.49 KG/M2 | HEIGHT: 74 IN | WEIGHT: 206.38 LBS | DIASTOLIC BLOOD PRESSURE: 97 MMHG | HEART RATE: 93 BPM | SYSTOLIC BLOOD PRESSURE: 189 MMHG

## 2024-03-15 DIAGNOSIS — E86.0 DEHYDRATION: ICD-10-CM

## 2024-03-15 DIAGNOSIS — N18.32 TYPE 2 DIABETES MELLITUS WITH STAGE 3B CHRONIC KIDNEY DISEASE, WITHOUT LONG-TERM CURRENT USE OF INSULIN: Primary | ICD-10-CM

## 2024-03-15 DIAGNOSIS — Z93.3 COLOSTOMY PRESENT: ICD-10-CM

## 2024-03-15 DIAGNOSIS — E11.22 TYPE 2 DIABETES MELLITUS WITH STAGE 3B CHRONIC KIDNEY DISEASE, WITHOUT LONG-TERM CURRENT USE OF INSULIN: Primary | ICD-10-CM

## 2024-03-15 PROCEDURE — 96360 HYDRATION IV INFUSION INIT: CPT | Mod: HCNC

## 2024-03-15 PROCEDURE — 25000003 PHARM REV CODE 250: Mod: HCNC | Performed by: STUDENT IN AN ORGANIZED HEALTH CARE EDUCATION/TRAINING PROGRAM

## 2024-03-15 RX ORDER — SODIUM CHLORIDE 0.9 % (FLUSH) 0.9 %
10 SYRINGE (ML) INJECTION
Status: CANCELLED | OUTPATIENT
Start: 2024-03-19

## 2024-03-15 RX ORDER — HEPARIN 100 UNIT/ML
500 SYRINGE INTRAVENOUS
Status: CANCELLED | OUTPATIENT
Start: 2024-03-19

## 2024-03-15 RX ADMIN — SODIUM CHLORIDE 1000 ML: 9 INJECTION, SOLUTION INTRAVENOUS at 08:03

## 2024-03-19 ENCOUNTER — INFUSION (OUTPATIENT)
Dept: INFECTIOUS DISEASES | Facility: HOSPITAL | Age: 74
End: 2024-03-19
Payer: MEDICARE

## 2024-03-19 VITALS
SYSTOLIC BLOOD PRESSURE: 186 MMHG | BODY MASS INDEX: 26.47 KG/M2 | RESPIRATION RATE: 18 BRPM | WEIGHT: 206.25 LBS | DIASTOLIC BLOOD PRESSURE: 84 MMHG | HEART RATE: 92 BPM | TEMPERATURE: 98 F | HEIGHT: 74 IN | OXYGEN SATURATION: 98 %

## 2024-03-19 DIAGNOSIS — E11.22 TYPE 2 DIABETES MELLITUS WITH STAGE 3B CHRONIC KIDNEY DISEASE, WITHOUT LONG-TERM CURRENT USE OF INSULIN: Primary | ICD-10-CM

## 2024-03-19 DIAGNOSIS — N18.32 TYPE 2 DIABETES MELLITUS WITH STAGE 3B CHRONIC KIDNEY DISEASE, WITHOUT LONG-TERM CURRENT USE OF INSULIN: Primary | ICD-10-CM

## 2024-03-19 DIAGNOSIS — E86.0 DEHYDRATION: ICD-10-CM

## 2024-03-19 DIAGNOSIS — Z93.3 COLOSTOMY PRESENT: ICD-10-CM

## 2024-03-19 PROCEDURE — 96360 HYDRATION IV INFUSION INIT: CPT | Mod: HCNC

## 2024-03-19 PROCEDURE — 25000003 PHARM REV CODE 250: Mod: HCNC | Performed by: STUDENT IN AN ORGANIZED HEALTH CARE EDUCATION/TRAINING PROGRAM

## 2024-03-19 RX ORDER — SODIUM CHLORIDE 0.9 % (FLUSH) 0.9 %
10 SYRINGE (ML) INJECTION
Status: CANCELLED | OUTPATIENT
Start: 2024-03-22

## 2024-03-19 RX ORDER — HEPARIN 100 UNIT/ML
500 SYRINGE INTRAVENOUS
Status: CANCELLED | OUTPATIENT
Start: 2024-03-22

## 2024-03-19 RX ADMIN — SODIUM CHLORIDE 1000 ML: 9 INJECTION, SOLUTION INTRAVENOUS at 08:03

## 2024-03-22 ENCOUNTER — CLINICAL SUPPORT (OUTPATIENT)
Dept: CARDIOLOGY | Facility: HOSPITAL | Age: 74
End: 2024-03-22
Payer: MEDICARE

## 2024-03-22 ENCOUNTER — CLINICAL SUPPORT (OUTPATIENT)
Dept: CARDIOLOGY | Facility: HOSPITAL | Age: 74
End: 2024-03-22
Attending: INTERNAL MEDICINE
Payer: MEDICARE

## 2024-03-22 ENCOUNTER — TELEPHONE (OUTPATIENT)
Dept: RHEUMATOLOGY | Facility: CLINIC | Age: 74
End: 2024-03-22
Payer: MEDICARE

## 2024-03-22 ENCOUNTER — INFUSION (OUTPATIENT)
Dept: INFECTIOUS DISEASES | Facility: HOSPITAL | Age: 74
End: 2024-03-22
Payer: MEDICARE

## 2024-03-22 VITALS
SYSTOLIC BLOOD PRESSURE: 175 MMHG | BODY MASS INDEX: 26.49 KG/M2 | HEIGHT: 74 IN | OXYGEN SATURATION: 96 % | WEIGHT: 206.38 LBS | HEART RATE: 92 BPM | DIASTOLIC BLOOD PRESSURE: 95 MMHG | TEMPERATURE: 98 F | RESPIRATION RATE: 18 BRPM

## 2024-03-22 DIAGNOSIS — Z93.3 COLOSTOMY PRESENT: ICD-10-CM

## 2024-03-22 DIAGNOSIS — N18.32 TYPE 2 DIABETES MELLITUS WITH STAGE 3B CHRONIC KIDNEY DISEASE, WITHOUT LONG-TERM CURRENT USE OF INSULIN: Primary | ICD-10-CM

## 2024-03-22 DIAGNOSIS — E86.0 DEHYDRATION: ICD-10-CM

## 2024-03-22 DIAGNOSIS — Z95.818 PRESENCE OF OTHER CARDIAC IMPLANTS AND GRAFTS: ICD-10-CM

## 2024-03-22 DIAGNOSIS — E11.22 TYPE 2 DIABETES MELLITUS WITH STAGE 3B CHRONIC KIDNEY DISEASE, WITHOUT LONG-TERM CURRENT USE OF INSULIN: Primary | ICD-10-CM

## 2024-03-22 DIAGNOSIS — I95.1 SYNCOPE DUE TO ORTHOSTATIC HYPOTENSION: ICD-10-CM

## 2024-03-22 PROCEDURE — 25000003 PHARM REV CODE 250: Mod: HCNC | Performed by: STUDENT IN AN ORGANIZED HEALTH CARE EDUCATION/TRAINING PROGRAM

## 2024-03-22 PROCEDURE — 93298 REM INTERROG DEV EVAL SCRMS: CPT | Mod: 26,HCNC,, | Performed by: INTERNAL MEDICINE

## 2024-03-22 PROCEDURE — 96360 HYDRATION IV INFUSION INIT: CPT | Mod: HCNC

## 2024-03-22 RX ORDER — SODIUM CHLORIDE 0.9 % (FLUSH) 0.9 %
10 SYRINGE (ML) INJECTION
Status: CANCELLED | OUTPATIENT
Start: 2024-03-26

## 2024-03-22 RX ORDER — ALLOPURINOL 300 MG/1
450 TABLET ORAL DAILY
Qty: 135 TABLET | Refills: 3 | Status: SHIPPED | OUTPATIENT
Start: 2024-03-22 | End: 2025-03-22

## 2024-03-22 RX ORDER — HEPARIN 100 UNIT/ML
500 SYRINGE INTRAVENOUS
Status: CANCELLED | OUTPATIENT
Start: 2024-03-26

## 2024-03-22 RX ADMIN — SODIUM CHLORIDE 1000 ML: 9 INJECTION, SOLUTION INTRAVENOUS at 08:03

## 2024-03-22 NOTE — TELEPHONE ENCOUNTER
----- Message from Pilar Bonner sent at 3/22/2024 11:55 AM CDT -----  Contact: [phone]  Requesting an RX refill or new RX.  Is this a refill or new RX: refill  RX name and strength (copy/paste from chart):  fludrocortisone (FLORINEF) 0.1 mg Tab  Is this a 30 day or 90 day RX: 90  Pharmacy name and phone # (copy/paste from chart):    [facility] - [place], [place] - [address]  [address] [address]  Phone: [phone] Fax: [phone]     The doctors have asked that we provide their patients with the following 2 reminders -- prescription refills can take up to 72 hours, and a friendly reminder that in the future you can use your [facility] account to request refills: yes

## 2024-03-22 NOTE — TELEPHONE ENCOUNTER
----- Message from Nancy Bonner sent at 3/22/2024 12:06 PM CDT -----  Regarding: Refill  Contact: pt [phone]  Rx Refill/Request     Is this a Refill or New Rx:  refill     Rx Name and Strength:    allopurinoL (ZYLOPRIM) 300 MG tablet       Gabapentin 600mg      Preferred Pharmacy with phone number:    [facility] - [place], [place] - [address]  [address]  [facility] [address]  Phone: [phone] Fax: [phone]

## 2024-03-22 NOTE — PROGRESS NOTES
Left a voicemail for a call back in regards to pts referral for Physical therapy.    Patient received 1L NS bolus over 1 hr as ordered. Patient tolerated well without difficulty. Return appointment provided.     Limited head-to-toe assessment due to privacy issues and visit reason though the opportunity was given for patient to express any concerns.

## 2024-03-23 RX ORDER — FLUDROCORTISONE ACETATE 0.1 MG/1
100 TABLET ORAL 2 TIMES DAILY
Qty: 180 TABLET | Refills: 3 | Status: SHIPPED | OUTPATIENT
Start: 2024-03-23

## 2024-03-25 RX ORDER — PREDNISONE 2.5 MG/1
2.5 TABLET ORAL DAILY
Qty: 90 TABLET | Refills: 0 | Status: SHIPPED | OUTPATIENT
Start: 2024-03-25 | End: 2024-05-13

## 2024-03-26 ENCOUNTER — INFUSION (OUTPATIENT)
Dept: INFECTIOUS DISEASES | Facility: HOSPITAL | Age: 74
End: 2024-03-26
Attending: NURSE PRACTITIONER
Payer: MEDICARE

## 2024-03-26 ENCOUNTER — PATIENT MESSAGE (OUTPATIENT)
Dept: ELECTROPHYSIOLOGY | Facility: CLINIC | Age: 74
End: 2024-03-26
Payer: MEDICARE

## 2024-03-26 VITALS
HEART RATE: 84 BPM | RESPIRATION RATE: 18 BRPM | HEIGHT: 74 IN | BODY MASS INDEX: 26.37 KG/M2 | TEMPERATURE: 99 F | WEIGHT: 205.5 LBS | OXYGEN SATURATION: 96 % | SYSTOLIC BLOOD PRESSURE: 182 MMHG | DIASTOLIC BLOOD PRESSURE: 90 MMHG

## 2024-03-26 DIAGNOSIS — Z93.3 COLOSTOMY PRESENT: ICD-10-CM

## 2024-03-26 DIAGNOSIS — E11.22 TYPE 2 DIABETES MELLITUS WITH STAGE 3B CHRONIC KIDNEY DISEASE, WITHOUT LONG-TERM CURRENT USE OF INSULIN: Primary | ICD-10-CM

## 2024-03-26 DIAGNOSIS — N18.32 TYPE 2 DIABETES MELLITUS WITH STAGE 3B CHRONIC KIDNEY DISEASE, WITHOUT LONG-TERM CURRENT USE OF INSULIN: Primary | ICD-10-CM

## 2024-03-26 DIAGNOSIS — E86.0 DEHYDRATION: ICD-10-CM

## 2024-03-26 PROCEDURE — 25000003 PHARM REV CODE 250: Mod: HCNC | Performed by: STUDENT IN AN ORGANIZED HEALTH CARE EDUCATION/TRAINING PROGRAM

## 2024-03-26 PROCEDURE — 96360 HYDRATION IV INFUSION INIT: CPT | Mod: HCNC

## 2024-03-26 RX ORDER — SODIUM CHLORIDE 0.9 % (FLUSH) 0.9 %
10 SYRINGE (ML) INJECTION
Status: CANCELLED | OUTPATIENT
Start: 2024-03-29

## 2024-03-26 RX ORDER — HEPARIN 100 UNIT/ML
500 SYRINGE INTRAVENOUS
Status: CANCELLED | OUTPATIENT
Start: 2024-03-29

## 2024-03-26 RX ADMIN — SODIUM CHLORIDE 1000 ML: 9 INJECTION, SOLUTION INTRAVENOUS at 08:03

## 2024-03-28 ENCOUNTER — INFUSION (OUTPATIENT)
Dept: INFECTIOUS DISEASES | Facility: HOSPITAL | Age: 74
End: 2024-03-28
Payer: MEDICARE

## 2024-03-28 ENCOUNTER — TELEPHONE (OUTPATIENT)
Dept: RHEUMATOLOGY | Facility: CLINIC | Age: 74
End: 2024-03-28

## 2024-03-28 ENCOUNTER — TELEPHONE (OUTPATIENT)
Dept: RHEUMATOLOGY | Facility: CLINIC | Age: 74
End: 2024-03-28
Payer: MEDICARE

## 2024-03-28 VITALS
HEIGHT: 74 IN | TEMPERATURE: 99 F | SYSTOLIC BLOOD PRESSURE: 190 MMHG | BODY MASS INDEX: 26.53 KG/M2 | HEART RATE: 80 BPM | WEIGHT: 206.69 LBS | OXYGEN SATURATION: 95 % | RESPIRATION RATE: 19 BRPM | DIASTOLIC BLOOD PRESSURE: 90 MMHG

## 2024-03-28 DIAGNOSIS — E86.0 DEHYDRATION: ICD-10-CM

## 2024-03-28 DIAGNOSIS — Z93.3 COLOSTOMY PRESENT: ICD-10-CM

## 2024-03-28 DIAGNOSIS — E11.22 TYPE 2 DIABETES MELLITUS WITH STAGE 3B CHRONIC KIDNEY DISEASE, WITHOUT LONG-TERM CURRENT USE OF INSULIN: Primary | ICD-10-CM

## 2024-03-28 DIAGNOSIS — N18.32 TYPE 2 DIABETES MELLITUS WITH STAGE 3B CHRONIC KIDNEY DISEASE, WITHOUT LONG-TERM CURRENT USE OF INSULIN: Primary | ICD-10-CM

## 2024-03-28 LAB
OHS CV AF BURDEN PERCENT: < 1
OHS CV DC REMOTE DEVICE TYPE: NORMAL
OHS CV ICD SHOCK: NO

## 2024-03-28 PROCEDURE — 25000003 PHARM REV CODE 250: Mod: HCNC | Performed by: STUDENT IN AN ORGANIZED HEALTH CARE EDUCATION/TRAINING PROGRAM

## 2024-03-28 PROCEDURE — 96360 HYDRATION IV INFUSION INIT: CPT | Mod: HCNC

## 2024-03-28 RX ORDER — HEPARIN 100 UNIT/ML
500 SYRINGE INTRAVENOUS
Status: CANCELLED | OUTPATIENT
Start: 2024-03-29

## 2024-03-28 RX ORDER — GABAPENTIN 600 MG/1
600 TABLET ORAL 3 TIMES DAILY
Qty: 270 TABLET | Refills: 1 | Status: SHIPPED | OUTPATIENT
Start: 2024-03-28

## 2024-03-28 RX ORDER — SODIUM CHLORIDE 0.9 % (FLUSH) 0.9 %
10 SYRINGE (ML) INJECTION
Status: CANCELLED | OUTPATIENT
Start: 2024-03-29

## 2024-03-28 RX ADMIN — SODIUM CHLORIDE 1000 ML: 9 INJECTION, SOLUTION INTRAVENOUS at 08:03

## 2024-03-28 NOTE — TELEPHONE ENCOUNTER
----- Message from Maximiliano Gray MA sent at 3/28/2024  2:19 PM CDT -----  Regarding: FW: Refill  Contact: [phone]  Gabapentin is not on list and read additional information  ----- Message -----  From: Génesis Esparza  Sent: 3/28/2024   2:05 PM CDT  To: [facility]AL Staff  Subject: Refill                                           Is this a Refill or New Rx (Script/Out of Refills):  Refill     Name of Caller: Patient     Rx Name: gabapentin capsule 600 mg    Pharmacy Name:   [facility] - [facility] [address]  [address] [address]  Phone: [phone] Fax: [phone]      Additional Information: Pt states that he needs a refill on above medication. He stated that the provider sent him a script for predniSONE (DELTASONE) 2.5 MG tablet and is unable to take that per pt states because his kidneys are bad. Please give pt a call back.

## 2024-03-28 NOTE — TELEPHONE ENCOUNTER
----- Message from Génesis Isaias sent at 3/28/2024  2:02 PM CDT -----  Regarding: Refill  Contact: [phone]  Is this a Refill or New Rx (Script/Out of Refills):  Refill     Name of Caller: Patient     Rx Name: gabapentin capsule 600 mg    Pharmacy Name:   [facility] - [facility] [address]  [address] [address]  Phone: [phone] Fax: [phone]      Additional Information: Pt states that he needs a refill on above medication. He stated that the provider sent him a script for predniSONE (DELTASONE) 2.5 MG tablet and is unable to take that per pt states because his kidneys are bad. Please give pt a call back.

## 2024-04-02 ENCOUNTER — INFUSION (OUTPATIENT)
Dept: INFECTIOUS DISEASES | Facility: HOSPITAL | Age: 74
End: 2024-04-02
Payer: MEDICARE

## 2024-04-02 VITALS
HEART RATE: 82 BPM | TEMPERATURE: 98 F | BODY MASS INDEX: 26.31 KG/M2 | RESPIRATION RATE: 18 BRPM | SYSTOLIC BLOOD PRESSURE: 152 MMHG | HEIGHT: 74 IN | WEIGHT: 205 LBS | DIASTOLIC BLOOD PRESSURE: 88 MMHG | OXYGEN SATURATION: 95 %

## 2024-04-02 DIAGNOSIS — N18.32 TYPE 2 DIABETES MELLITUS WITH STAGE 3B CHRONIC KIDNEY DISEASE, WITHOUT LONG-TERM CURRENT USE OF INSULIN: Primary | ICD-10-CM

## 2024-04-02 DIAGNOSIS — Z93.3 COLOSTOMY PRESENT: ICD-10-CM

## 2024-04-02 DIAGNOSIS — E11.22 TYPE 2 DIABETES MELLITUS WITH STAGE 3B CHRONIC KIDNEY DISEASE, WITHOUT LONG-TERM CURRENT USE OF INSULIN: Primary | ICD-10-CM

## 2024-04-02 DIAGNOSIS — E86.0 DEHYDRATION: ICD-10-CM

## 2024-04-02 PROCEDURE — 25000003 PHARM REV CODE 250: Mod: HCNC | Performed by: STUDENT IN AN ORGANIZED HEALTH CARE EDUCATION/TRAINING PROGRAM

## 2024-04-02 PROCEDURE — 96360 HYDRATION IV INFUSION INIT: CPT | Mod: HCNC

## 2024-04-02 RX ORDER — SODIUM CHLORIDE 0.9 % (FLUSH) 0.9 %
10 SYRINGE (ML) INJECTION
Status: CANCELLED | OUTPATIENT
Start: 2024-04-05

## 2024-04-02 RX ORDER — HEPARIN 100 UNIT/ML
500 SYRINGE INTRAVENOUS
Status: CANCELLED | OUTPATIENT
Start: 2024-04-05

## 2024-04-02 RX ADMIN — SODIUM CHLORIDE 1000 ML: 9 INJECTION, SOLUTION INTRAVENOUS at 07:04

## 2024-04-04 DIAGNOSIS — I95.1 SYNCOPE DUE TO ORTHOSTATIC HYPOTENSION: ICD-10-CM

## 2024-04-04 DIAGNOSIS — I25.10 CORONARY ARTERY DISEASE INVOLVING NATIVE CORONARY ARTERY OF NATIVE HEART WITHOUT ANGINA PECTORIS: ICD-10-CM

## 2024-04-04 RX ORDER — FLUDROCORTISONE ACETATE 0.1 MG/1
100 TABLET ORAL
Qty: 90 TABLET | Refills: 3 | OUTPATIENT
Start: 2024-04-04

## 2024-04-04 NOTE — TELEPHONE ENCOUNTER
Refill Decision Note   Jarrett Charlton  is requesting a refill authorization.  Brief Assessment and Rationale for Refill:  Quick Discontinue     Medication Therapy Plan:  180+3 refills Receipt confirmed by pharmacy (3/23/2024  4:20 AM CDT)      Comments:     Note composed:5:22 AM 04/04/2024             Appointments     Last Visit   2/12/2024 Poli Gonzalez MD   Next Visit   4/30/2024 Poli Gonzalez MD

## 2024-04-04 NOTE — TELEPHONE ENCOUNTER
----- Message from Mariano Antunez sent at 4/4/2024 12:08 PM CDT -----  Requesting an RX refill or new RX.  Is this a refill or new RX: Refill  RX name and strength pravastatin (PRAVACHOL) 40 MG tablet  Is this a 30 day or 90 day RX: 90  Pharmacy name and phone # [facility] - [place], [place] - [address] Phone: [phone] Fax: [phone]

## 2024-04-05 ENCOUNTER — INFUSION (OUTPATIENT)
Dept: INFECTIOUS DISEASES | Facility: HOSPITAL | Age: 74
End: 2024-04-05
Payer: MEDICARE

## 2024-04-05 VITALS
SYSTOLIC BLOOD PRESSURE: 195 MMHG | TEMPERATURE: 99 F | HEART RATE: 78 BPM | WEIGHT: 204.25 LBS | DIASTOLIC BLOOD PRESSURE: 92 MMHG | RESPIRATION RATE: 18 BRPM | HEIGHT: 74 IN | OXYGEN SATURATION: 94 % | BODY MASS INDEX: 26.21 KG/M2

## 2024-04-05 DIAGNOSIS — Z93.3 COLOSTOMY PRESENT: ICD-10-CM

## 2024-04-05 DIAGNOSIS — N18.32 TYPE 2 DIABETES MELLITUS WITH STAGE 3B CHRONIC KIDNEY DISEASE, WITHOUT LONG-TERM CURRENT USE OF INSULIN: Primary | ICD-10-CM

## 2024-04-05 DIAGNOSIS — E86.0 DEHYDRATION: ICD-10-CM

## 2024-04-05 DIAGNOSIS — E11.22 TYPE 2 DIABETES MELLITUS WITH STAGE 3B CHRONIC KIDNEY DISEASE, WITHOUT LONG-TERM CURRENT USE OF INSULIN: Primary | ICD-10-CM

## 2024-04-05 PROCEDURE — 96360 HYDRATION IV INFUSION INIT: CPT | Mod: HCNC

## 2024-04-05 PROCEDURE — 25000003 PHARM REV CODE 250: Mod: HCNC | Performed by: STUDENT IN AN ORGANIZED HEALTH CARE EDUCATION/TRAINING PROGRAM

## 2024-04-05 RX ORDER — HEPARIN 100 UNIT/ML
500 SYRINGE INTRAVENOUS
Status: DISCONTINUED | OUTPATIENT
Start: 2024-04-05 | End: 2024-04-05 | Stop reason: HOSPADM

## 2024-04-05 RX ORDER — SODIUM CHLORIDE 0.9 % (FLUSH) 0.9 %
10 SYRINGE (ML) INJECTION
Status: DISCONTINUED | OUTPATIENT
Start: 2024-04-05 | End: 2024-04-05 | Stop reason: HOSPADM

## 2024-04-05 RX ORDER — SODIUM CHLORIDE 0.9 % (FLUSH) 0.9 %
10 SYRINGE (ML) INJECTION
Status: CANCELLED | OUTPATIENT
Start: 2024-04-09

## 2024-04-05 RX ORDER — HEPARIN 100 UNIT/ML
500 SYRINGE INTRAVENOUS
Status: CANCELLED | OUTPATIENT
Start: 2024-04-09

## 2024-04-05 RX ADMIN — SODIUM CHLORIDE 1000 ML: 9 INJECTION, SOLUTION INTRAVENOUS at 08:04

## 2024-04-05 NOTE — TELEPHONE ENCOUNTER
Refill Routing Note   Medication(s) are not appropriate for processing by [facility] for the following reason(s):        Responsible provider unclear    [facility] action(s):  Defer      Medication Therapy Plan: no pcp listed      Appointments  past 12m or future 3m with PCP    Date Provider   Last Visit   Visit date not found No, Primary Doctor   Next Visit   Visit date not found No, Primary Doctor   ED visits in past 90 days: 0        Note composed:2:28 PM 04/05/2024

## 2024-04-05 NOTE — TELEPHONE ENCOUNTER
----- Message from Lauren Arguello sent at 4/5/2024 11:58 AM CDT -----  Contact: [phone]  Requesting an RX refill or new RX.  Is this a refill or new RX: refill   RX name and strength (copy/paste from chart):  pravastatin (PRAVACHOL) 40 MG tablet  90 tablets  Is this a 30 day or 90 day RX: 90 day   Pharmacy name and phone # (copy/paste from chart):    [facility] - [place], [place] - [address]  [address] [address]  Phone: [phone] Fax: [phone]        The doctors have asked that we provide their patients with the following 2 reminders -- prescription refills can take up to 72 hours, and a friendly reminder that in the future you can use your [facility] account to request refills: pt is aware        Thank you

## 2024-04-07 NOTE — TELEPHONE ENCOUNTER
Refill Routing Note   Medication(s) are not appropriate for processing by [facility] for the following reason(s):        Responsible provider unclear    [facility] action(s):  Defer      Medication Therapy Plan: no pcp listed      Appointments  past 12m or future 3m with PCP    Date Provider   Last Visit   Visit date not found No, Primary Doctor   Next Visit   Visit date not found No, Primary Doctor   ED visits in past 90 days: 0        Note composed:10:42 AM 04/07/2024

## 2024-04-08 RX ORDER — PRAVASTATIN SODIUM 40 MG/1
40 TABLET ORAL DAILY
Qty: 90 TABLET | Refills: 3 | Status: SHIPPED | OUTPATIENT
Start: 2024-04-08 | End: 2025-04-03

## 2024-04-09 ENCOUNTER — INFUSION (OUTPATIENT)
Dept: INFECTIOUS DISEASES | Facility: HOSPITAL | Age: 74
End: 2024-04-09
Payer: MEDICARE

## 2024-04-09 VITALS
RESPIRATION RATE: 16 BRPM | HEART RATE: 83 BPM | DIASTOLIC BLOOD PRESSURE: 93 MMHG | WEIGHT: 201.81 LBS | SYSTOLIC BLOOD PRESSURE: 167 MMHG | TEMPERATURE: 98 F | HEIGHT: 74 IN | OXYGEN SATURATION: 97 % | BODY MASS INDEX: 25.9 KG/M2

## 2024-04-09 DIAGNOSIS — E86.0 DEHYDRATION: ICD-10-CM

## 2024-04-09 DIAGNOSIS — E11.22 TYPE 2 DIABETES MELLITUS WITH STAGE 3B CHRONIC KIDNEY DISEASE, WITHOUT LONG-TERM CURRENT USE OF INSULIN: Primary | ICD-10-CM

## 2024-04-09 DIAGNOSIS — N18.32 TYPE 2 DIABETES MELLITUS WITH STAGE 3B CHRONIC KIDNEY DISEASE, WITHOUT LONG-TERM CURRENT USE OF INSULIN: Primary | ICD-10-CM

## 2024-04-09 DIAGNOSIS — Z93.3 COLOSTOMY PRESENT: ICD-10-CM

## 2024-04-09 PROCEDURE — 96360 HYDRATION IV INFUSION INIT: CPT | Mod: HCNC

## 2024-04-09 PROCEDURE — 25000003 PHARM REV CODE 250: Mod: HCNC | Performed by: STUDENT IN AN ORGANIZED HEALTH CARE EDUCATION/TRAINING PROGRAM

## 2024-04-09 RX ORDER — HEPARIN 100 UNIT/ML
500 SYRINGE INTRAVENOUS
Status: CANCELLED | OUTPATIENT
Start: 2024-04-12

## 2024-04-09 RX ORDER — SODIUM CHLORIDE 0.9 % (FLUSH) 0.9 %
10 SYRINGE (ML) INJECTION
Status: CANCELLED | OUTPATIENT
Start: 2024-04-12

## 2024-04-09 RX ADMIN — SODIUM CHLORIDE 1000 ML: 9 INJECTION, SOLUTION INTRAVENOUS at 08:04

## 2024-04-12 ENCOUNTER — INFUSION (OUTPATIENT)
Dept: INFECTIOUS DISEASES | Facility: HOSPITAL | Age: 74
End: 2024-04-12
Payer: MEDICARE

## 2024-04-12 VITALS
WEIGHT: 202.69 LBS | DIASTOLIC BLOOD PRESSURE: 82 MMHG | HEIGHT: 74 IN | OXYGEN SATURATION: 95 % | HEART RATE: 82 BPM | RESPIRATION RATE: 18 BRPM | BODY MASS INDEX: 26.01 KG/M2 | TEMPERATURE: 98 F | SYSTOLIC BLOOD PRESSURE: 179 MMHG

## 2024-04-12 DIAGNOSIS — Z93.3 COLOSTOMY PRESENT: ICD-10-CM

## 2024-04-12 DIAGNOSIS — E11.22 TYPE 2 DIABETES MELLITUS WITH STAGE 3B CHRONIC KIDNEY DISEASE, WITHOUT LONG-TERM CURRENT USE OF INSULIN: Primary | ICD-10-CM

## 2024-04-12 DIAGNOSIS — N18.32 TYPE 2 DIABETES MELLITUS WITH STAGE 3B CHRONIC KIDNEY DISEASE, WITHOUT LONG-TERM CURRENT USE OF INSULIN: Primary | ICD-10-CM

## 2024-04-12 DIAGNOSIS — E86.0 DEHYDRATION: ICD-10-CM

## 2024-04-12 PROCEDURE — 96360 HYDRATION IV INFUSION INIT: CPT | Mod: HCNC

## 2024-04-12 PROCEDURE — 25000003 PHARM REV CODE 250: Mod: HCNC | Performed by: STUDENT IN AN ORGANIZED HEALTH CARE EDUCATION/TRAINING PROGRAM

## 2024-04-12 RX ORDER — SODIUM CHLORIDE 0.9 % (FLUSH) 0.9 %
10 SYRINGE (ML) INJECTION
Status: CANCELLED | OUTPATIENT
Start: 2024-04-16

## 2024-04-12 RX ORDER — HEPARIN 100 UNIT/ML
500 SYRINGE INTRAVENOUS
Status: CANCELLED | OUTPATIENT
Start: 2024-04-16

## 2024-04-12 RX ADMIN — SODIUM CHLORIDE 1000 ML: 9 INJECTION, SOLUTION INTRAVENOUS at 08:04

## 2024-04-12 NOTE — PROGRESS NOTES
Patient received 1L NS bolus over 1 hr as ordered. Patient tolerated well without difficulty. Return appointment provided.     Limited head-to-toe assessment due to privacy issues and visit reason though the opportunity was given for patient to express any concerns.                                                                             Statement Selected

## 2024-04-16 ENCOUNTER — INFUSION (OUTPATIENT)
Dept: INFECTIOUS DISEASES | Facility: HOSPITAL | Age: 74
End: 2024-04-16
Payer: MEDICARE

## 2024-04-16 ENCOUNTER — CLINICAL SUPPORT (OUTPATIENT)
Dept: CARDIOLOGY | Facility: HOSPITAL | Age: 74
End: 2024-04-16
Payer: MEDICARE

## 2024-04-16 VITALS
RESPIRATION RATE: 18 BRPM | HEART RATE: 94 BPM | TEMPERATURE: 98 F | SYSTOLIC BLOOD PRESSURE: 167 MMHG | DIASTOLIC BLOOD PRESSURE: 99 MMHG | BODY MASS INDEX: 26.3 KG/M2 | OXYGEN SATURATION: 96 % | WEIGHT: 204.94 LBS | HEIGHT: 74 IN

## 2024-04-16 DIAGNOSIS — Z95.818 PRESENCE OF OTHER CARDIAC IMPLANTS AND GRAFTS: ICD-10-CM

## 2024-04-16 DIAGNOSIS — E11.22 TYPE 2 DIABETES MELLITUS WITH STAGE 3B CHRONIC KIDNEY DISEASE, WITHOUT LONG-TERM CURRENT USE OF INSULIN: Primary | ICD-10-CM

## 2024-04-16 DIAGNOSIS — E86.0 DEHYDRATION: ICD-10-CM

## 2024-04-16 DIAGNOSIS — Z93.3 COLOSTOMY PRESENT: ICD-10-CM

## 2024-04-16 DIAGNOSIS — N18.32 TYPE 2 DIABETES MELLITUS WITH STAGE 3B CHRONIC KIDNEY DISEASE, WITHOUT LONG-TERM CURRENT USE OF INSULIN: Primary | ICD-10-CM

## 2024-04-16 PROCEDURE — 25000003 PHARM REV CODE 250: Mod: HCNC | Performed by: STUDENT IN AN ORGANIZED HEALTH CARE EDUCATION/TRAINING PROGRAM

## 2024-04-16 PROCEDURE — 96360 HYDRATION IV INFUSION INIT: CPT | Mod: HCNC

## 2024-04-16 RX ORDER — HEPARIN 100 UNIT/ML
500 SYRINGE INTRAVENOUS
Status: CANCELLED | OUTPATIENT
Start: 2024-04-19

## 2024-04-16 RX ORDER — SODIUM CHLORIDE 0.9 % (FLUSH) 0.9 %
10 SYRINGE (ML) INJECTION
Status: CANCELLED | OUTPATIENT
Start: 2024-04-19

## 2024-04-16 RX ADMIN — SODIUM CHLORIDE 1000 ML: 9 INJECTION, SOLUTION INTRAVENOUS at 08:04

## 2024-04-19 ENCOUNTER — INFUSION (OUTPATIENT)
Dept: INFECTIOUS DISEASES | Facility: HOSPITAL | Age: 74
End: 2024-04-19
Payer: MEDICARE

## 2024-04-19 VITALS
RESPIRATION RATE: 18 BRPM | WEIGHT: 208.13 LBS | TEMPERATURE: 98 F | DIASTOLIC BLOOD PRESSURE: 90 MMHG | HEART RATE: 83 BPM | SYSTOLIC BLOOD PRESSURE: 174 MMHG | BODY MASS INDEX: 26.71 KG/M2 | OXYGEN SATURATION: 98 % | HEIGHT: 74 IN

## 2024-04-19 DIAGNOSIS — N18.32 TYPE 2 DIABETES MELLITUS WITH STAGE 3B CHRONIC KIDNEY DISEASE, WITHOUT LONG-TERM CURRENT USE OF INSULIN: Primary | ICD-10-CM

## 2024-04-19 DIAGNOSIS — E11.22 TYPE 2 DIABETES MELLITUS WITH STAGE 3B CHRONIC KIDNEY DISEASE, WITHOUT LONG-TERM CURRENT USE OF INSULIN: Primary | ICD-10-CM

## 2024-04-19 DIAGNOSIS — E86.0 DEHYDRATION: ICD-10-CM

## 2024-04-19 DIAGNOSIS — Z93.3 COLOSTOMY PRESENT: ICD-10-CM

## 2024-04-19 PROCEDURE — 25000003 PHARM REV CODE 250: Mod: HCNC | Performed by: STUDENT IN AN ORGANIZED HEALTH CARE EDUCATION/TRAINING PROGRAM

## 2024-04-19 PROCEDURE — 96360 HYDRATION IV INFUSION INIT: CPT | Mod: HCNC

## 2024-04-19 RX ORDER — HEPARIN 100 UNIT/ML
500 SYRINGE INTRAVENOUS
Status: CANCELLED | OUTPATIENT
Start: 2024-04-23

## 2024-04-19 RX ORDER — SODIUM CHLORIDE 0.9 % (FLUSH) 0.9 %
10 SYRINGE (ML) INJECTION
Status: CANCELLED | OUTPATIENT
Start: 2024-04-23

## 2024-04-19 RX ADMIN — SODIUM CHLORIDE 1000 ML: 9 INJECTION, SOLUTION INTRAVENOUS at 08:04

## 2024-04-22 ENCOUNTER — CLINICAL SUPPORT (OUTPATIENT)
Dept: CARDIOLOGY | Facility: HOSPITAL | Age: 74
End: 2024-04-22
Attending: INTERNAL MEDICINE
Payer: MEDICARE

## 2024-04-22 DIAGNOSIS — Z95.818 PRESENCE OF OTHER CARDIAC IMPLANTS AND GRAFTS: ICD-10-CM

## 2024-04-22 PROCEDURE — 93298 REM INTERROG DEV EVAL SCRMS: CPT | Mod: HCNC | Performed by: INTERNAL MEDICINE

## 2024-04-22 PROCEDURE — 93298 REM INTERROG DEV EVAL SCRMS: CPT | Mod: 26,HCNC,, | Performed by: INTERNAL MEDICINE

## 2024-04-23 ENCOUNTER — INFUSION (OUTPATIENT)
Dept: INFECTIOUS DISEASES | Facility: HOSPITAL | Age: 74
End: 2024-04-23
Payer: MEDICARE

## 2024-04-23 VITALS
HEART RATE: 84 BPM | DIASTOLIC BLOOD PRESSURE: 87 MMHG | SYSTOLIC BLOOD PRESSURE: 181 MMHG | WEIGHT: 205.25 LBS | TEMPERATURE: 98 F | RESPIRATION RATE: 19 BRPM | HEIGHT: 74 IN | BODY MASS INDEX: 26.34 KG/M2 | OXYGEN SATURATION: 99 %

## 2024-04-23 DIAGNOSIS — Z93.3 COLOSTOMY PRESENT: ICD-10-CM

## 2024-04-23 DIAGNOSIS — E11.22 TYPE 2 DIABETES MELLITUS WITH STAGE 3B CHRONIC KIDNEY DISEASE, WITHOUT LONG-TERM CURRENT USE OF INSULIN: Primary | ICD-10-CM

## 2024-04-23 DIAGNOSIS — E86.0 DEHYDRATION: ICD-10-CM

## 2024-04-23 DIAGNOSIS — N18.32 TYPE 2 DIABETES MELLITUS WITH STAGE 3B CHRONIC KIDNEY DISEASE, WITHOUT LONG-TERM CURRENT USE OF INSULIN: Primary | ICD-10-CM

## 2024-04-23 PROCEDURE — 25000003 PHARM REV CODE 250: Mod: HCNC | Performed by: STUDENT IN AN ORGANIZED HEALTH CARE EDUCATION/TRAINING PROGRAM

## 2024-04-23 PROCEDURE — 96360 HYDRATION IV INFUSION INIT: CPT | Mod: HCNC

## 2024-04-23 RX ORDER — HEPARIN 100 UNIT/ML
500 SYRINGE INTRAVENOUS
Status: CANCELLED | OUTPATIENT
Start: 2024-04-26

## 2024-04-23 RX ORDER — SODIUM CHLORIDE 0.9 % (FLUSH) 0.9 %
10 SYRINGE (ML) INJECTION
Status: CANCELLED | OUTPATIENT
Start: 2024-04-26

## 2024-04-23 RX ADMIN — SODIUM CHLORIDE 1000 ML: 9 INJECTION, SOLUTION INTRAVENOUS at 08:04

## 2024-04-25 PROBLEM — I12.0 HYPERTENSIVE CHRONIC KIDNEY DISEASE WITH STAGE 5 CHRONIC KIDNEY DISEASE OR END STAGE RENAL DISEASE: Status: ACTIVE | Noted: 2024-04-25

## 2024-04-26 ENCOUNTER — INFUSION (OUTPATIENT)
Dept: INFECTIOUS DISEASES | Facility: HOSPITAL | Age: 74
End: 2024-04-26
Payer: MEDICARE

## 2024-04-26 VITALS
BODY MASS INDEX: 26.58 KG/M2 | RESPIRATION RATE: 18 BRPM | SYSTOLIC BLOOD PRESSURE: 186 MMHG | OXYGEN SATURATION: 97 % | WEIGHT: 207.13 LBS | HEIGHT: 74 IN | HEART RATE: 98 BPM | TEMPERATURE: 98 F | DIASTOLIC BLOOD PRESSURE: 90 MMHG

## 2024-04-26 DIAGNOSIS — E86.0 DEHYDRATION: ICD-10-CM

## 2024-04-26 DIAGNOSIS — Z93.3 COLOSTOMY PRESENT: ICD-10-CM

## 2024-04-26 DIAGNOSIS — E11.22 TYPE 2 DIABETES MELLITUS WITH STAGE 3B CHRONIC KIDNEY DISEASE, WITHOUT LONG-TERM CURRENT USE OF INSULIN: Primary | ICD-10-CM

## 2024-04-26 DIAGNOSIS — N18.32 TYPE 2 DIABETES MELLITUS WITH STAGE 3B CHRONIC KIDNEY DISEASE, WITHOUT LONG-TERM CURRENT USE OF INSULIN: Primary | ICD-10-CM

## 2024-04-26 LAB
OHS CV AF BURDEN PERCENT: < 1
OHS CV DC REMOTE DEVICE TYPE: NORMAL

## 2024-04-26 PROCEDURE — 25000003 PHARM REV CODE 250: Mod: HCNC | Performed by: STUDENT IN AN ORGANIZED HEALTH CARE EDUCATION/TRAINING PROGRAM

## 2024-04-26 PROCEDURE — 96360 HYDRATION IV INFUSION INIT: CPT | Mod: HCNC

## 2024-04-26 RX ORDER — HEPARIN 100 UNIT/ML
500 SYRINGE INTRAVENOUS
Status: CANCELLED | OUTPATIENT
Start: 2024-04-30

## 2024-04-26 RX ORDER — SODIUM CHLORIDE 0.9 % (FLUSH) 0.9 %
10 SYRINGE (ML) INJECTION
Status: CANCELLED | OUTPATIENT
Start: 2024-04-30

## 2024-04-26 RX ADMIN — SODIUM CHLORIDE 1000 ML: 9 INJECTION, SOLUTION INTRAVENOUS at 08:04

## 2024-04-29 ENCOUNTER — ANESTHESIA EVENT (OUTPATIENT)
Dept: MEDSURG UNIT | Facility: HOSPITAL | Age: 74
End: 2024-04-29
Payer: MEDICARE

## 2024-04-29 ENCOUNTER — INFUSION (OUTPATIENT)
Dept: INFECTIOUS DISEASES | Facility: HOSPITAL | Age: 74
End: 2024-04-29
Payer: MEDICARE

## 2024-04-29 ENCOUNTER — TELEPHONE (OUTPATIENT)
Dept: ELECTROPHYSIOLOGY | Facility: CLINIC | Age: 74
End: 2024-04-29
Payer: MEDICARE

## 2024-04-29 VITALS
OXYGEN SATURATION: 95 % | HEIGHT: 74 IN | RESPIRATION RATE: 19 BRPM | SYSTOLIC BLOOD PRESSURE: 154 MMHG | TEMPERATURE: 99 F | HEART RATE: 82 BPM | DIASTOLIC BLOOD PRESSURE: 89 MMHG | BODY MASS INDEX: 26.69 KG/M2 | WEIGHT: 208 LBS

## 2024-04-29 DIAGNOSIS — E86.0 DEHYDRATION: ICD-10-CM

## 2024-04-29 DIAGNOSIS — Z93.3 COLOSTOMY PRESENT: ICD-10-CM

## 2024-04-29 DIAGNOSIS — N18.32 TYPE 2 DIABETES MELLITUS WITH STAGE 3B CHRONIC KIDNEY DISEASE, WITHOUT LONG-TERM CURRENT USE OF INSULIN: Primary | ICD-10-CM

## 2024-04-29 DIAGNOSIS — E11.22 TYPE 2 DIABETES MELLITUS WITH STAGE 3B CHRONIC KIDNEY DISEASE, WITHOUT LONG-TERM CURRENT USE OF INSULIN: Primary | ICD-10-CM

## 2024-04-29 PROCEDURE — 96360 HYDRATION IV INFUSION INIT: CPT | Mod: HCNC

## 2024-04-29 PROCEDURE — 25000003 PHARM REV CODE 250: Mod: HCNC | Performed by: STUDENT IN AN ORGANIZED HEALTH CARE EDUCATION/TRAINING PROGRAM

## 2024-04-29 RX ORDER — HEPARIN 100 UNIT/ML
500 SYRINGE INTRAVENOUS
Status: CANCELLED | OUTPATIENT
Start: 2024-04-30

## 2024-04-29 RX ORDER — SODIUM CHLORIDE 0.9 % (FLUSH) 0.9 %
10 SYRINGE (ML) INJECTION
Status: CANCELLED | OUTPATIENT
Start: 2024-04-30

## 2024-04-29 RX ADMIN — SODIUM CHLORIDE 1000 ML: 9 INJECTION, SOLUTION INTRAVENOUS at 08:04

## 2024-04-29 NOTE — TELEPHONE ENCOUNTER
Spoke to patient    CONFIRMED procedure arrival time of 5:15 AM    Reiterated instructions including:  -Directions to check in desk  -NPO after midnight night prior to procedure  -High importance of HOLDING glimepiride AM of procedure.    -Pre-procedure LABS reviewed   -Confirmed presence of implanted device/stimulator *MDT ILR in situ*  -Confirmed no fever, cough, or shortness of breath in the past 30 days  -Confirmed no redness, rash, irritation, or yeast infection to groin area.   -Bathe night prior and morning prior to procedure with Hibiclens solution or an antibacterial soap  -Reviewed current visitor policy    Patient verbalized understanding of above and appreciated the call.

## 2024-04-29 NOTE — ANESTHESIA PREPROCEDURE EVALUATION
04/29/2024  Jarrett Charlton Jr. is a 74 y.o., male.      Pre-op Assessment    I have reviewed the Patient Summary Reports.       I have reviewed the Medications.     Review of Systems  Anesthesia Hx:  No problems with previous Anesthesia               Denies Personal Hx of Anesthesia complications.                    Cardiovascular:     Hypertension   CAD    Dysrhythmias              Coronary Artery Disease:                            Hypertension     Disorder of Cardiac Rhythm     Pulmonary:  Pneumonia                  Pulmonary Infection:  Pneumonia.     Renal/:  Chronic Renal Disease        Kidney Function/Disease             Hepatic/GI:     GERD      Gerd          Musculoskeletal:  Arthritis        Arthritis          Neurological:    Neuromuscular Disease,  Headaches      Dx of Headaches   Arthritis                         Neuromuscular Disease   Endocrine:  Diabetes    Diabetes                      Psych:  Psychiatric History                  Physical Exam    Airway:  No airway management difficulties anticipated  Dental:  No active dental issues noted  Chest/Lungs:  Clear to auscultation    Heart:  Rate: Normal  Rhythm: Regular Rhythm  Sounds: Normal        Anesthesia Plan  Type of Anesthesia, risks & benefits discussed:    Anesthesia Type: Gen Natural Airway  Informed Consent: Informed consent signed with the Patient and all parties understand the risks and agree with anesthesia plan.  All questions answered.   ASA Score: 3  Anesthesia Plan Notes: Chart reviewed. Patient seen and examined. Anesthesia plan discussed and questions answered. E-consent signed. Tripp Gale MD    Ready For Surgery From Anesthesia Perspective.     .

## 2024-04-30 ENCOUNTER — ANESTHESIA (OUTPATIENT)
Dept: MEDSURG UNIT | Facility: HOSPITAL | Age: 74
End: 2024-04-30
Payer: MEDICARE

## 2024-04-30 ENCOUNTER — HOSPITAL ENCOUNTER (OUTPATIENT)
Facility: HOSPITAL | Age: 74
Discharge: HOME | End: 2024-04-30
Attending: INTERNAL MEDICINE | Admitting: INTERNAL MEDICINE
Payer: MEDICARE

## 2024-04-30 VITALS
RESPIRATION RATE: 12 BRPM | SYSTOLIC BLOOD PRESSURE: 184 MMHG | BODY MASS INDEX: 26.18 KG/M2 | WEIGHT: 204 LBS | TEMPERATURE: 99 F | HEART RATE: 70 BPM | OXYGEN SATURATION: 99 % | DIASTOLIC BLOOD PRESSURE: 96 MMHG | HEIGHT: 74 IN

## 2024-04-30 DIAGNOSIS — I49.9 ARRHYTHMIA: ICD-10-CM

## 2024-04-30 DIAGNOSIS — Z86.79 S/P RADIOFREQUENCY ABLATION OPERATION FOR ARRHYTHMIA: ICD-10-CM

## 2024-04-30 DIAGNOSIS — Z98.890 S/P RADIOFREQUENCY ABLATION OPERATION FOR ARRHYTHMIA: ICD-10-CM

## 2024-04-30 DIAGNOSIS — I47.29 PAROXYSMAL VENTRICULAR TACHYCARDIA: ICD-10-CM

## 2024-04-30 PROBLEM — I47.10 SVT (SUPRAVENTRICULAR TACHYCARDIA): Status: ACTIVE | Noted: 2024-04-30

## 2024-04-30 LAB
OHS QRS DURATION: 118 MS
OHS QRS DURATION: 136 MS
OHS QTC CALCULATION: 451 MS
OHS QTC CALCULATION: 550 MS
POCT GLUCOSE: 115 MG/DL (ref 70–110)
POCT GLUCOSE: 123 MG/DL (ref 70–110)

## 2024-04-30 PROCEDURE — 93010 ELECTROCARDIOGRAM REPORT: CPT | Mod: HCNC,,, | Performed by: INTERNAL MEDICINE

## 2024-04-30 PROCEDURE — 63600175 PHARM REV CODE 636 W HCPCS: Mod: HCNC | Performed by: STUDENT IN AN ORGANIZED HEALTH CARE EDUCATION/TRAINING PROGRAM

## 2024-04-30 PROCEDURE — C1732 CATH, EP, DIAG/ABL, 3D/VECT: HCPCS | Mod: HCNC | Performed by: INTERNAL MEDICINE

## 2024-04-30 PROCEDURE — 93005 ELECTROCARDIOGRAM TRACING: CPT | Mod: HCNC

## 2024-04-30 PROCEDURE — 82962 GLUCOSE BLOOD TEST: CPT | Mod: HCNC | Performed by: INTERNAL MEDICINE

## 2024-04-30 PROCEDURE — 25000003 PHARM REV CODE 250: Mod: HCNC | Performed by: INTERNAL MEDICINE

## 2024-04-30 PROCEDURE — D9220A PRA ANESTHESIA: Mod: HCNC,ANES,, | Performed by: ANESTHESIOLOGY

## 2024-04-30 PROCEDURE — C1730 CATH, EP, 19 OR FEW ELECT: HCPCS | Mod: HCNC | Performed by: INTERNAL MEDICINE

## 2024-04-30 PROCEDURE — C1894 INTRO/SHEATH, NON-LASER: HCPCS | Mod: HCNC | Performed by: INTERNAL MEDICINE

## 2024-04-30 PROCEDURE — 27201423 OPTIME MED/SURG SUP & DEVICES STERILE SUPPLY: Mod: HCNC | Performed by: INTERNAL MEDICINE

## 2024-04-30 PROCEDURE — 93653 COMPRE EP EVAL TX SVT: CPT | Mod: HCNC | Performed by: INTERNAL MEDICINE

## 2024-04-30 PROCEDURE — 93653 COMPRE EP EVAL TX SVT: CPT | Mod: HCNC,,, | Performed by: INTERNAL MEDICINE

## 2024-04-30 PROCEDURE — 93623 PRGRMD STIMJ&PACG IV RX NFS: CPT | Mod: HCNC | Performed by: INTERNAL MEDICINE

## 2024-04-30 PROCEDURE — 93623 PRGRMD STIMJ&PACG IV RX NFS: CPT | Mod: 26,HCNC,, | Performed by: INTERNAL MEDICINE

## 2024-04-30 PROCEDURE — 37000009 HC ANESTHESIA EA ADD 15 MINS: Mod: HCNC | Performed by: INTERNAL MEDICINE

## 2024-04-30 PROCEDURE — 25000003 PHARM REV CODE 250: Mod: HCNC | Performed by: NURSE ANESTHETIST, CERTIFIED REGISTERED

## 2024-04-30 PROCEDURE — 63600175 PHARM REV CODE 636 W HCPCS: Mod: HCNC | Performed by: NURSE ANESTHETIST, CERTIFIED REGISTERED

## 2024-04-30 PROCEDURE — D9220A PRA ANESTHESIA: Mod: HCNC,CRNA,, | Performed by: NURSE ANESTHETIST, CERTIFIED REGISTERED

## 2024-04-30 PROCEDURE — 37000008 HC ANESTHESIA 1ST 15 MINUTES: Mod: HCNC | Performed by: INTERNAL MEDICINE

## 2024-04-30 PROCEDURE — 93005 ELECTROCARDIOGRAM TRACING: CPT | Mod: HCNC,59

## 2024-04-30 PROCEDURE — 93010 ELECTROCARDIOGRAM REPORT: CPT | Mod: HCNC,76,, | Performed by: INTERNAL MEDICINE

## 2024-04-30 RX ORDER — FENTANYL CITRATE 50 UG/ML
25 INJECTION, SOLUTION INTRAMUSCULAR; INTRAVENOUS EVERY 5 MIN PRN
Status: COMPLETED | OUTPATIENT
Start: 2024-04-30 | End: 2024-04-30

## 2024-04-30 RX ORDER — ACETAMINOPHEN 325 MG/1
650 TABLET ORAL EVERY 4 HOURS PRN
Status: DISCONTINUED | OUTPATIENT
Start: 2024-04-30 | End: 2024-04-30 | Stop reason: HOSPADM

## 2024-04-30 RX ORDER — PROPOFOL 10 MG/ML
VIAL (ML) INTRAVENOUS CONTINUOUS PRN
Status: DISCONTINUED | OUTPATIENT
Start: 2024-04-30 | End: 2024-04-30

## 2024-04-30 RX ORDER — DEXMEDETOMIDINE HYDROCHLORIDE 100 UG/ML
INJECTION, SOLUTION INTRAVENOUS
Status: DISCONTINUED | OUTPATIENT
Start: 2024-04-30 | End: 2024-04-30

## 2024-04-30 RX ORDER — LIDOCAINE HYDROCHLORIDE 20 MG/ML
INJECTION, SOLUTION INFILTRATION; PERINEURAL
Status: DISCONTINUED | OUTPATIENT
Start: 2024-04-30 | End: 2024-04-30 | Stop reason: HOSPADM

## 2024-04-30 RX ORDER — HALOPERIDOL 5 MG/ML
0.5 INJECTION INTRAMUSCULAR EVERY 10 MIN PRN
Status: DISCONTINUED | OUTPATIENT
Start: 2024-04-30 | End: 2024-04-30 | Stop reason: HOSPADM

## 2024-04-30 RX ORDER — HEPARIN SOD,PORCINE/0.9 % NACL 1000/500ML
INTRAVENOUS SOLUTION INTRAVENOUS
Status: DISCONTINUED | OUTPATIENT
Start: 2024-04-30 | End: 2024-04-30 | Stop reason: HOSPADM

## 2024-04-30 RX ORDER — MIDAZOLAM HYDROCHLORIDE 1 MG/ML
INJECTION INTRAMUSCULAR; INTRAVENOUS
Status: DISCONTINUED | OUTPATIENT
Start: 2024-04-30 | End: 2024-04-30

## 2024-04-30 RX ADMIN — SODIUM CHLORIDE: 9 INJECTION, SOLUTION INTRAVENOUS at 07:04

## 2024-04-30 RX ADMIN — ACETAMINOPHEN 650 MG: 325 TABLET ORAL at 09:04

## 2024-04-30 RX ADMIN — FENTANYL CITRATE 25 MCG: 50 INJECTION INTRAMUSCULAR; INTRAVENOUS at 10:04

## 2024-04-30 RX ADMIN — PROPOFOL 75 MCG/KG/MIN: 10 INJECTION, EMULSION INTRAVENOUS at 07:04

## 2024-04-30 RX ADMIN — SODIUM CHLORIDE, SODIUM GLUCONATE, SODIUM ACETATE, POTASSIUM CHLORIDE, MAGNESIUM CHLORIDE, SODIUM PHOSPHATE, DIBASIC, AND POTASSIUM PHOSPHATE: .53; .5; .37; .037; .03; .012; .00082 INJECTION, SOLUTION INTRAVENOUS at 07:04

## 2024-04-30 RX ADMIN — DEXMEDETOMIDINE 4 MCG: 100 INJECTION, SOLUTION, CONCENTRATE INTRAVENOUS at 09:04

## 2024-04-30 RX ADMIN — FENTANYL CITRATE 25 MCG: 50 INJECTION INTRAMUSCULAR; INTRAVENOUS at 09:04

## 2024-04-30 RX ADMIN — MIDAZOLAM HYDROCHLORIDE 2 MG: 2 INJECTION, SOLUTION INTRAMUSCULAR; INTRAVENOUS at 07:04

## 2024-04-30 RX ADMIN — DEXMEDETOMIDINE 8 MCG: 100 INJECTION, SOLUTION, CONCENTRATE INTRAVENOUS at 08:04

## 2024-04-30 RX ADMIN — ISOPROTERENOL HYDROCHLORIDE 0.5 MCG/MIN: 0.2 INJECTION, SOLUTION INTRAMUSCULAR; INTRAVENOUS at 08:04

## 2024-04-30 NOTE — PROGRESS NOTES
Received report from Ashlyn. Patient s/p RFA, AAOx3. VSS, no c/o pain or discomfort at this time, resp even and unlabored. Gauze/tegaderm dressing to bilateral groin is CDI. No active bleeding. No hematoma noted. Post procedure protocol reviewed with patient and patient's family. Understanding verbalized. Family members at bedside. Nurse call bell within reach.

## 2024-04-30 NOTE — PLAN OF CARE
"Vss. Sr noted. S/p svt ablation. Pt arrived to ep pacu 3 with sanford groins. Manual pressure in ep lab, hemostasis at 0900am.  Bedrest x4hrs. Done at 1300. Pt's daughter updated over text messaging system and over phone.  Pt remains awaiting bed in sscu. 12 lead EKG done and in chart.  Pt complaints of "tenderness/sore to left groin". Prn tylenol and iv pain meds given per md order prn.  At this time "tolerable".  Warm blanket placed over left site.  Sanford groins guaze/trans film noted. No hematoma or drainage noted. Palpable pulses noted to ble.  Pt tolerating sips of water.  Bg 112.  Due to void later.  Pt has right lower quad ileostomy in place. Stoma pink, moist, protruding above skin level, bag intact noted with yellow/brown colored stool. Bag to dependent drainage.  Pt states, "I usually void at night".  No bladder pressure noted.  See flowsheet for full assessment.   "

## 2024-04-30 NOTE — PROGRESS NOTES
Patient ambulated in hallway. Bilateral groin dressings remained CDI. No bleeding or hematoma noted. Patient tolerated well. Pt DC'd per MD order. Discharge instructions given including activity, wound care, S&S of infections, future appointments, and when to call MD. Medications reviewed including when to take next dose. Telemetry and PIV DC'd, catheter tip intact. Pt left unit via wheelchair with family.

## 2024-04-30 NOTE — PROGRESS NOTES
"Dr avilez ep fellow at bedside, updating pt on procedure. Pt verbalizes understanding. Md assessed groins.  Pt with complaints of "soreness to left groin". No hematoma or drainage noted. Pt remains awaiting bed in sscu. Pt's daughter updated by text messaging system.   "

## 2024-04-30 NOTE — H&P
[facility] - Short Stay Cardiac Unit  Cardiac Electrophysiology  History and Physical     Admission Date: 4/30/2024  Code Status: Prior   Attending Provider: Franky Taylor MD   Principal Problem:<principal problem not specified>    Subjective:     Chief Complaint:  SVT     HPI:  73 y.o. male with SBO (s/p colostomy), GERD, parotid mass (s/p resection), BPH, prostate CA (s/p TRUP), CKD, pleural plaquem orthostatic hypotension, HLD, DM, CAD (PCI 2017), VT, ILR (2021), SVT here for SVT EPS and RFA. ECG show SVT with termination with PVC. EF normal.     Past Medical History:   Diagnosis Date    Basal cell carcinoma     BPH (benign prostatic hyperplasia)     s/p TURP    Carotid stenosis     Chronic kidney disease     Claudication     Coronary artery disease     DDD (degenerative disc disease) 10/21/2013    Diabetes mellitus with renal complications     Disc disease, degenerative, cervical     Encounter for blood transfusion     GERD (gastroesophageal reflux disease)     Gout, chronic     History of ulcerative colitis     s/p colectomy and ileostomy    HLD (hyperlipidemia)     HTN (hypertension)     Ileostomy in place 1982    RBBB     Squamous cell carcinoma 03/08/2018    Left superior helix near insertion    Squamous cell carcinoma 04/12/2018    Left forearm x 5    Ventricular tachycardia        Past Surgical History:   Procedure Laterality Date    cardiac stents      CATARACT EXTRACTION Bilateral     CERVICAL FUSION      CHOLECYSTECTOMY N/A 2/14/2023    Procedure: CHOLECYSTECTOMY;  Surgeon: Mike Joyce MD;  Location: [facility] OR;  Service: General;  Laterality: N/A;  very high probabilty of converison to open    colectomy and ileostomy  1985    ENDOSCOPIC ULTRASOUND OF UPPER GASTROINTESTINAL TRACT N/A 2/10/2023    Procedure: ULTRASOUND, UPPER GI TRACT, ENDOSCOPIC;  Surgeon: Poli Fuller MD;  Location: [facility] ENDO;  Service: Endoscopy;  Laterality: N/A;    ERCP N/A 2/10/2023    Procedure: ERCP (ENDOSCOPIC RETROGRADE  CHOLANGIOPANCREATOGRAPHY);  Surgeon: Poli Fuller MD;  Location: [facility] ENDO;  Service: Endoscopy;  Laterality: N/A;    EXCISION OF PAROTID GLAND Left 12/18/2020    Procedure: EXCISION, PAROTID GLAND;  Surgeon: Michael Pinzon MD;  Location: [facility] OR 2ND FLR;  Service: ENT;  Laterality: Left;    INSERTION OF IMPLANTABLE LOOP RECORDER  06/07/2021    INSERTION OF IMPLANTABLE LOOP RECORDER Left 6/7/2021    Procedure: INSERTION, IMPLANTABLE LOOP RECORDER;  Surgeon: Romario Dao MD;  Location: [facility] CATH LAB;  Service: Cardiology;  Laterality: Left;    LEFT HEART CATHETERIZATION Right 4/15/2021    Procedure: CATHETERIZATION, HEART, LEFT;  Surgeon: Marcio Jones MD;  Location: [facility] CATH LAB;  Service: Cardiology;  Laterality: Right;    LYSIS OF ADHESIONS N/A 11/9/2020    Procedure: LYSIS, ADHESIONS,  ERAS low;  Surgeon: CED Haley MD;  Location: [facility] 2ND FLR;  Service: Colon and Rectal;  Laterality: N/A;    LYSIS OF ADHESIONS N/A 2/14/2023    Procedure: LYSIS, ADHESIONS;  Surgeon: Mike Joyce MD;  Location: [facility] OR;  Service: General;  Laterality: N/A;    POUCHOSCOPY N/A 4/6/2022    Procedure: ENDOSCOPY, POUCH, SMALL INTESTINE, DIAGNOSTIC;  Surgeon: Dieter Juarez MD;  Location: [facility] (2ND FLR);  Service: Endoscopy;  Laterality: N/A;    REPAIR, HERNIA, PARASTOMAL N/A 11/9/2020    Procedure: REPAIR, HERNIA, PARASTOMAL;  Surgeon: CED Haley MD;  Location: [facility] OR 2ND FLR;  Service: Colon and Rectal;  Laterality: N/A;    TRANSURETHRAL RESECTION OF PROSTATE (TURP) WITHOUT USE OF LASER N/A 1/23/2019    Procedure: TURP, WITHOUT USING LASER BIPOLAR;  Surgeon: Catarino Mota MD;  Location: [facility] 1ST FLR;  Service: Urology;  Laterality: N/A;  1.5 HOURS       Review of patient's allergies indicates:   Allergen Reactions    Crestor [rosuvastatin]     Lipitor [atorvastatin]        No current facility-administered medications for this encounter.     Facility-Administered Medications  Ordered in Other Encounters   Medication Dose Route Frequency Provider Last Rate Last Admin    sodium chloride 0.9% in 1,000 mL infusion   Intravenous Continuous Order, Paper         Family History       Problem Relation (Age of Onset)    Cancer Father    Dementia Mother    Diabetes Father          Tobacco Use    Smoking status: Never    Smokeless tobacco: Never   Substance and Sexual Activity    Alcohol use: No    Drug use: No    Sexual activity: Not Currently     Partners: Female     Review of Systems   All other systems reviewed and are negative.    Objective:     Vital Signs (Most Recent):  Temp: 97.7 °F (36.5 °C) (04/30/24 0555)  Pulse: 89 (04/30/24 0555)  Resp: 17 (04/30/24 0555)  BP: (!) 202/108 (04/30/24 0612)  SpO2: 96 % (04/30/24 0555) Vital Signs (24h Range):  Temp:  [97.7 °F (36.5 °C)-98.8 °F (37.1 °C)] 97.7 °F (36.5 °C)  Pulse:  [82-89] 89  Resp:  [17-19] 17  SpO2:  [95 %-96 %] 96 %  BP: (154-202)/() 202/108       Weight: 92.5 kg (204 lb)  Body mass index is 26.19 kg/m².    SpO2: 96 %        Physical Exam  General: NAD. AAO  HENT: EOMI  Neck: supple. No JVD  CV: RRR. Normal S1/S2. No gallops, rubs, or murmurs. 2+ radial pulses  Resp: CTAB. No increased work of breathing  Ext: warm. No edema.         Significant Labs: All pertinent lab results from the last 24 hours have been reviewed.    Significant Imaging:  Reviewed  Assessment and Plan:     SVT (supraventricular tachycardia)  Patient here for SVT EPS & RFA    The risks, benefits and alternatives of the procedure were explained to the patient, patient's family and/or surrogate decision maker. Risks include (but not limited to) bleeding, hematoma, infection, pain, vascular damage, damage to structures surrounding the vasculature, myocardial damage [perforation, valvular damage], cardiac tamponade, phrenic nerve damage, injury to conduction system which may require permanent pacemaker implantation, CVA, MI, and death. All questions were answered.  Patient is understanding of these risks, and would like to proceed. Consents signed.         Chris Richardson MD  Cardiac Electrophysiology  [facility] - Short Stay Cardiac Unit

## 2024-04-30 NOTE — ASSESSMENT & PLAN NOTE
Patient here for SVT EPS & RFA    The risks, benefits and alternatives of the procedure were explained to the patient, patient's family and/or surrogate decision maker. Risks include (but not limited to) bleeding, hematoma, infection, pain, vascular damage, damage to structures surrounding the vasculature, myocardial damage [perforation, valvular damage], cardiac tamponade, phrenic nerve damage, injury to conduction system which may require permanent pacemaker implantation, CVA, MI, and death. All questions were answered. Patient is understanding of these risks, and would like to proceed. Consents signed.

## 2024-04-30 NOTE — DISCHARGE INSTRUCTIONS
Take Aspirin 81 mg per day for 30 days after your ablation starting the same day as your procedure  If you are on an anticoagulant (e.g., apixiban [Eliquis], dabigatran [Pradaxa], rivaroxaban [Xarelto], or warfarin [Coumadin], enoxaparin [Lovenox]) or alternative antiplatelet agent (e.g., clopidogrel [Plavix], prasugrel [Effient]) you do not need to take Aspirin for the purposes of your ablation procedure.  Remove the bandages over your groin area the morning after your procedure. You can show after you remove these bandages. Keep the groin sites clean and dry. You do not need to apply ointments or bandages to the area after you remove the bandages.  Do not take baths or submerge your groin area or at least 1 week or when the puncture sites in your groin have completely healed  If oozing from groin site occurs, apply pressure without letting up for 15 minutes and lay flat for 1 hour. If bleeding has resolved, you can continue to monitor. If the bleeding continues or there is significant swelling or pain in the groin area, please visit the nearest ER for evaluation and treatment. DO NOT STOP TAKING YOUR BLOOD THINNER UNLESS INSTRUCTED BY A PHYSICIAN.   Do not lift anything over 10 lbs for the first week after your procedure, and avoid strenuous activity during this time to allow for the groin sites to heal.  After 1 week, there are no activity restrictions  Please contact the electrophysiology clinic or go to the ER if you experience: severe chest pain, shortness of breath, bleeding that does not stop after pressure applied, or swelling of the groin sites, or any other concerns.

## 2024-04-30 NOTE — TRANSFER OF CARE
Anesthesia Transfer of Care Note    Patient: Jarrett Charlton Jr.    Procedure(s) Performed: Procedure(s) (LRB):  Ablation, SVT, Accessory Pathway (N/A)  Cardioversion or Defibrillation    Patient location: PACU    Anesthesia Type: general    Transport from OR: Transported from OR on 6-10 L/min O2 by face mask with adequate spontaneous ventilation    Post pain: adequate analgesia    Post assessment: no apparent anesthetic complications and tolerated procedure well    Post vital signs: stable    Level of consciousness: awake    Nausea/Vomiting: no nausea/vomiting    Complications: none    Transfer of care protocol was followed      Last vitals:   Bp: 146/80  HR: 100  RR: 16  T: 36.5  O2sat: 100%

## 2024-04-30 NOTE — PLAN OF CARE
Patient arrived to room. PIV placed, x2. Admit assessment completed. Plan of care discussed with patient. Will monitor. Call light within reach, instructed in use.  POC

## 2024-04-30 NOTE — HPI
73 y.o. male with SBO (s/p colostomy), GERD, parotid mass (s/p resection), BPH, prostate CA (s/p TRUP), CKD, pleural plaquem orthostatic hypotension, HLD, DM, CAD (PCI 2017), VT, ILR (2021), SVT here for SVT EPS and RFA. ECG show SVT with termination with PVC. EF normal.

## 2024-04-30 NOTE — PROGRESS NOTES
Patient ambulated in hallway. Patient tolerated well. R groin dressing remained CDI. L groin dressing saturated upon return to bed. Dressing removed, no active bleeding noted. No hematoma. Pressure held x 10 minutes. Dr Richardson notified. Instructed to extend bedrest x 1 hour and then ambulate. If no bleeding, may discharge. POC discussed with patient and daughter.

## 2024-04-30 NOTE — NURSING TRANSFER
Nursing Transfer Note      4/30/2024   1:10 PM    Nurse giving handoff:marbin,rn   Nurse receiving handoff:marielena schneider rn    Reason patient is being transferred: ep pacu 3 to cath lab holding 7/home    Transfer To: ep pacu 3 to cath lab holding 7    Transfer via stretcher    Transfer with cardiac monitoring, cath lab holding 7 cm    Transported by marbin,rn    Transfer Vital Signs:  Blood Pressure:154/89  Heart Rate:91  O2:97% room air  Temperature:97.7  Respirations:18    Telemetry: cm cath lab holding 7   Order for Tele Monitor? Yes    Additional Lines: pt has right lq ileostomy    4eyes on Skin: yes foam drsg to mamie heels and sacrum    Medicines sent: none    Any special needs or follow-up needed: bedrest x4hrs. Done at  1300.    Patient belongings transferred with patient:  sscu locker    Chart send with patient: Yes    Notified: daughter    Patient reassessed at: 4/30/24 1245    Upon arrival to floor: cardiac monitor applied, patient oriented to room, call bell in reach, and bed in lowest position

## 2024-05-01 DIAGNOSIS — G47.62 NOCTURNAL LEG CRAMPS: ICD-10-CM

## 2024-05-01 RX ORDER — LANOLIN ALCOHOL/MO/W.PET/CERES
1 CREAM (GRAM) TOPICAL
Qty: 90 TABLET | Refills: 3 | Status: SHIPPED | OUTPATIENT
Start: 2024-05-01

## 2024-05-01 NOTE — TELEPHONE ENCOUNTER
Refill Routing Note   Medication(s) are not appropriate for processing by [facility] for the following reason(s):        Outside of protocol    [facility] action(s):  Route             Appointments  past 12m or future 3m with PCP    Date Provider   Last Visit   2/12/2024 Poli Gonzalez MD   Next Visit   Visit date not found Poli Gonzalez MD   ED visits in past 90 days: 0        Note composed:11:10 AM 05/01/2024

## 2024-05-02 NOTE — DISCHARGE SUMMARY
[facility] - Short Stay Cardiac Unit  Cardiac Electrophysiology  Discharge Summary      Patient Name: Jarrett Charlton Jr.  MRN: [id number]  Admission Date: 4/30/2024  Hospital Length of Stay: 0 days  Discharge Date and Time: 4/30/2024  2:30 PM  Attending Physician: Leslie att. providers found    Discharging Provider: Chris Richardson MD  Primary Care Physician: Leslie, Primary Doctor    HPI:   73 y.o. male with SBO (s/p colostomy), GERD, parotid mass (s/p resection), BPH, prostate CA (s/p TRUP), CKD, pleural plaquem orthostatic hypotension, HLD, DM, CAD (PCI 2017), VT, ILR (2021), SVT here for SVT EPS and RFA. ECG show SVT with termination with PVC. EF normal.     Procedure(s) (LRB):  Ablation, SVT, Accessory Pathway (N/A)  Cardioversion or Defibrillation     Indwelling Lines/Drains at time of discharge:  Lines/Drains/Airways       Drain  Duration                  Ileostomy 01/07/19 1101 RLQ 1942 days                    Hospital Course:  S/p AT ablation at 6 o'clock on tricuspid annulus. ASA x 30 days.    Goals of Care Treatment Preferences:  Code Status: Full Code      Consults:     Significant Diagnostic Studies: Labs: All labs within the past 24 hours have been reviewed    Pending Diagnostic Studies:       None            Final Active Diagnoses:    Diagnosis Date Noted POA    SVT (supraventricular tachycardia) [I47.10] 04/30/2024 Yes      Problems Resolved During this Admission:     No new Assessment & Plan notes have been filed under this hospital service since the last note was generated.  Service: Arrhythmia      Discharged Condition: stable    Disposition: Home or Self Care    Follow Up:   Follow-up Information       Franky Taylor MD Follow up in 3 month(s).    Specialties: Electrophysiology, Cardiovascular Disease, Cardiology  Contact information:  [address]  [facility] [address]  [phone]               Russell Gamboa MD Follow up.    Specialties: Cardiovascular Disease, Cardiology  Contact  information:  [address]  [facility] [address]  [phone]                           Patient Instructions:   No discharge procedures on file.  Medications:  Reconciled Home Medications:      Medication List        CONTINUE taking these medications      ACCU-CHEK PAUL CONTROL SOLN Soln  Generic drug: blood glucose control high,low  1 drop by NOT APPLICABLE route once daily.     ACCU-CHEK GUIDE TEST STRIPS Strp  Generic drug: blood sugar diagnostic  TEST BLOOD SUGAR THREE TIMES DAILY     ACCU-CHEK SOFTCLIX LANCETS Misc  Generic drug: lancets  TEST BLOOD SUGAR THREE TIMES DAILY     allopurinoL 300 MG tablet  Commonly known as: ZYLOPRIM  Take 1.5 tablets (450 mg total) by mouth once daily.     aspirin 81 MG EC tablet  Commonly known as: ECOTRIN  Take 1 tablet (81 mg total) by mouth once daily.     blood-glucose meter Misc  Commonly known as: ACCU-CHEK GUIDE GLUCOSE METER  Accucheck BID     brimonidine 0.2% 0.2 % Drop  Commonly known as: ALPHAGAN  Place 1 drop into both eyes 2 (two) times a day.     calcium-vitamin D3 500 mg-5 mcg (200 unit) per tablet  Commonly known as: OS-DAKOTA 500 + D3  Take 1 tablet by mouth once daily.     capsaicin 0.1 % Crea  Apply painful joint TID     cholestyramine-aspartame 4 gram Pwpk  Commonly known as: QUESTRAN LIGHT  Take 1 packet (4 g total) by mouth 3 (three) times daily with meals.     colchicine 0.6 mg tablet  Commonly known as: COLCRYS  Take 2 then 1 one hour later prn gout can repeta in 2 weeks prn     DROPSAFE ALCOHOL PREP PADS Padm  Generic drug: alcohol swabs  USE TOPICALLY 5 TIMES DAILY.     ferrous sulfate 325 mg (65 mg iron) Tab tablet  Commonly known as: IRON  Take 1 tablet (325 mg total) by mouth every other day.     fludrocortisone 0.1 mg Tab  Commonly known as: FLORINEF  Take 1 tablet (100 mcg total) by mouth 2 (two) times daily.     gabapentin 600 MG tablet  Commonly known as: NEURONTIN  Take 1 tablet (600 mg total) by mouth 3 (three) times daily.     glimepiride  4 MG tablet  Commonly known as: AMARYL  TAKE 1 TABLET TWICE DAILY BEFORE MEALS     LIDOcaine 5 %  Commonly known as: LIDODERM  Place 1 patch onto the skin as needed. Remove & Discard patch within 12 hours or as directed by MD     midodrine 10 MG tablet  Commonly known as: PROAMATINE  Take 1 tablet (10 mg total) by mouth 3 (three) times daily as needed (Hypotension).     pantoprazole 40 MG tablet  Commonly known as: PROTONIX  Take 1 tablet (40 mg total) by mouth once daily.     pravastatin 40 MG tablet  Commonly known as: PRAVACHOL  Take 1 tablet (40 mg total) by mouth once daily.     predniSONE 2.5 MG tablet  Commonly known as: DELTASONE  Take 1 tablet (2.5 mg total) by mouth once daily.     sodium bicarbonate 650 MG tablet  Take 2 tablets (1,300 mg total) by mouth 2 (two) times daily.            STOP taking these medications      cephALEXin 500 MG capsule  Commonly known as: KEFLEX            ASK your doctor about these medications      levocetirizine 5 MG tablet  Commonly known as: XYZAL  Take 1 tablet (5 mg total) by mouth every evening.     pyRIDostigmine bromide 30 mg Tab  Take 30 mg by mouth every 8 (eight) hours.              Time spent on the discharge of patient: 15 minutes    Chris Richardson MD  Cardiac Electrophysiology  [facility] - Short Stay Cardiac Unit

## 2024-05-03 ENCOUNTER — INFUSION (OUTPATIENT)
Dept: INFECTIOUS DISEASES | Facility: HOSPITAL | Age: 74
End: 2024-05-03
Payer: MEDICARE

## 2024-05-03 VITALS
OXYGEN SATURATION: 97 % | WEIGHT: 203.13 LBS | DIASTOLIC BLOOD PRESSURE: 95 MMHG | HEART RATE: 81 BPM | HEIGHT: 74 IN | BODY MASS INDEX: 26.07 KG/M2 | SYSTOLIC BLOOD PRESSURE: 165 MMHG | TEMPERATURE: 98 F | RESPIRATION RATE: 20 BRPM

## 2024-05-03 DIAGNOSIS — E11.22 TYPE 2 DIABETES MELLITUS WITH STAGE 3B CHRONIC KIDNEY DISEASE, WITHOUT LONG-TERM CURRENT USE OF INSULIN: Primary | ICD-10-CM

## 2024-05-03 DIAGNOSIS — Z93.3 COLOSTOMY PRESENT: ICD-10-CM

## 2024-05-03 DIAGNOSIS — E86.0 DEHYDRATION: ICD-10-CM

## 2024-05-03 DIAGNOSIS — N18.32 TYPE 2 DIABETES MELLITUS WITH STAGE 3B CHRONIC KIDNEY DISEASE, WITHOUT LONG-TERM CURRENT USE OF INSULIN: Primary | ICD-10-CM

## 2024-05-03 PROCEDURE — 96360 HYDRATION IV INFUSION INIT: CPT | Mod: HCNC

## 2024-05-03 PROCEDURE — 25000003 PHARM REV CODE 250: Mod: HCNC | Performed by: STUDENT IN AN ORGANIZED HEALTH CARE EDUCATION/TRAINING PROGRAM

## 2024-05-03 RX ORDER — HEPARIN 100 UNIT/ML
500 SYRINGE INTRAVENOUS
Status: CANCELLED | OUTPATIENT
Start: 2024-05-07

## 2024-05-03 RX ORDER — SODIUM CHLORIDE 0.9 % (FLUSH) 0.9 %
10 SYRINGE (ML) INJECTION
Status: CANCELLED | OUTPATIENT
Start: 2024-05-07

## 2024-05-03 RX ADMIN — SODIUM CHLORIDE 1000 ML: 9 INJECTION, SOLUTION INTRAVENOUS at 08:05

## 2024-05-03 NOTE — PROGRESS NOTES
Patient received 1L NS bolus over 1 hr as ordered. Patient tolerated well without difficulty.     Next appointment scheduled and future appointments made till July 2024. Patient made aware.     Limited head-to-toe assessment due to privacy issues and visit reason though the opportunity was given for patient to express any concerns.

## 2024-05-06 ENCOUNTER — CLINICAL SUPPORT (OUTPATIENT)
Dept: PRIMARY CARE CLINIC | Facility: CLINIC | Age: 74
End: 2024-05-06
Payer: MEDICARE

## 2024-05-06 ENCOUNTER — OFFICE VISIT (OUTPATIENT)
Dept: PRIMARY CARE CLINIC | Facility: CLINIC | Age: 74
End: 2024-05-06
Payer: MEDICARE

## 2024-05-06 VITALS
HEART RATE: 50 BPM | DIASTOLIC BLOOD PRESSURE: 78 MMHG | OXYGEN SATURATION: 97 % | BODY MASS INDEX: 26.3 KG/M2 | HEIGHT: 74 IN | WEIGHT: 204.94 LBS | SYSTOLIC BLOOD PRESSURE: 142 MMHG | RESPIRATION RATE: 18 BRPM

## 2024-05-06 DIAGNOSIS — I95.1 SYNCOPE DUE TO ORTHOSTATIC HYPOTENSION: ICD-10-CM

## 2024-05-06 DIAGNOSIS — I47.29 PAROXYSMAL VENTRICULAR TACHYCARDIA: ICD-10-CM

## 2024-05-06 DIAGNOSIS — Z93.3 COLOSTOMY PRESENT: ICD-10-CM

## 2024-05-06 DIAGNOSIS — C61 PROSTATE CANCER: ICD-10-CM

## 2024-05-06 DIAGNOSIS — N18.30 TYPE 2 DIABETES MELLITUS WITH STAGE 3 CHRONIC KIDNEY DISEASE, WITHOUT LONG-TERM CURRENT USE OF INSULIN, UNSPECIFIED WHETHER STAGE 3A OR 3B CKD: Primary | ICD-10-CM

## 2024-05-06 DIAGNOSIS — E11.22 TYPE 2 DIABETES MELLITUS WITH STAGE 3 CHRONIC KIDNEY DISEASE, WITHOUT LONG-TERM CURRENT USE OF INSULIN, UNSPECIFIED WHETHER STAGE 3A OR 3B CKD: Primary | ICD-10-CM

## 2024-05-06 DIAGNOSIS — N18.4 CKD (CHRONIC KIDNEY DISEASE), STAGE IV: ICD-10-CM

## 2024-05-06 DIAGNOSIS — E11.42 TYPE 2 DIABETES MELLITUS WITH DIABETIC POLYNEUROPATHY, WITHOUT LONG-TERM CURRENT USE OF INSULIN: ICD-10-CM

## 2024-05-06 DIAGNOSIS — I12.0 HYPERTENSIVE CHRONIC KIDNEY DISEASE WITH STAGE 5 CHRONIC KIDNEY DISEASE OR END STAGE RENAL DISEASE: ICD-10-CM

## 2024-05-06 DIAGNOSIS — G90.1 DYSAUTONOMIA: ICD-10-CM

## 2024-05-06 DIAGNOSIS — Z93.2 HIGH OUTPUT ILEOSTOMY: ICD-10-CM

## 2024-05-06 DIAGNOSIS — E78.00 PURE HYPERCHOLESTEROLEMIA: ICD-10-CM

## 2024-05-06 DIAGNOSIS — R19.8 HIGH OUTPUT ILEOSTOMY: ICD-10-CM

## 2024-05-06 DIAGNOSIS — I10 ESSENTIAL HYPERTENSION: ICD-10-CM

## 2024-05-06 PROCEDURE — 3066F NEPHROPATHY DOC TX: CPT | Mod: HCNC,CPTII,S$GLB, | Performed by: INTERNAL MEDICINE

## 2024-05-06 PROCEDURE — 1160F RVW MEDS BY RX/DR IN RCRD: CPT | Mod: HCNC,CPTII,S$GLB, | Performed by: INTERNAL MEDICINE

## 2024-05-06 PROCEDURE — 92228 IMG RTA DETC/MNTR DS PHY/QHP: CPT | Mod: 26,HCNC,S$GLB, | Performed by: INTERNAL MEDICINE

## 2024-05-06 PROCEDURE — 1101F PT FALLS ASSESS-DOCD LE1/YR: CPT | Mod: HCNC,CPTII,S$GLB, | Performed by: INTERNAL MEDICINE

## 2024-05-06 PROCEDURE — 1159F MED LIST DOCD IN RCRD: CPT | Mod: HCNC,CPTII,S$GLB, | Performed by: INTERNAL MEDICINE

## 2024-05-06 PROCEDURE — 92228 IMG RTA DETC/MNTR DS PHY/QHP: CPT | Mod: HCNC,TC,S$GLB, | Performed by: INTERNAL MEDICINE

## 2024-05-06 PROCEDURE — 3078F DIAST BP <80 MM HG: CPT | Mod: HCNC,CPTII,S$GLB, | Performed by: INTERNAL MEDICINE

## 2024-05-06 PROCEDURE — 1126F AMNT PAIN NOTED NONE PRSNT: CPT | Mod: HCNC,CPTII,S$GLB, | Performed by: INTERNAL MEDICINE

## 2024-05-06 PROCEDURE — 2025F 7 FLD RTA PHOTO W/O RTNOPTHY: CPT | Mod: HCNC,CPTII,S$GLB, | Performed by: INTERNAL MEDICINE

## 2024-05-06 PROCEDURE — 3288F FALL RISK ASSESSMENT DOCD: CPT | Mod: HCNC,CPTII,S$GLB, | Performed by: INTERNAL MEDICINE

## 2024-05-06 PROCEDURE — 99999 PR PBB SHADOW E&M-EST. PATIENT-LVL V: CPT | Mod: PBBFAC,HCNC,, | Performed by: INTERNAL MEDICINE

## 2024-05-06 PROCEDURE — 3044F HG A1C LEVEL LT 7.0%: CPT | Mod: HCNC,CPTII,S$GLB, | Performed by: INTERNAL MEDICINE

## 2024-05-06 PROCEDURE — 3077F SYST BP >= 140 MM HG: CPT | Mod: HCNC,CPTII,S$GLB, | Performed by: INTERNAL MEDICINE

## 2024-05-06 PROCEDURE — 96372 THER/PROPH/DIAG INJ SC/IM: CPT | Mod: HCNC,S$GLB,, | Performed by: INTERNAL MEDICINE

## 2024-05-06 PROCEDURE — 99214 OFFICE O/P EST MOD 30 MIN: CPT | Mod: HCNC,25,S$GLB, | Performed by: INTERNAL MEDICINE

## 2024-05-06 RX ORDER — GLIMEPIRIDE 4 MG/1
4 TABLET ORAL
Qty: 180 TABLET | Refills: 1 | Status: SHIPPED | OUTPATIENT
Start: 2024-05-06 | End: 2024-06-13

## 2024-05-06 RX ORDER — TRIAMCINOLONE ACETONIDE 40 MG/ML
40 INJECTION, SUSPENSION INTRA-ARTICULAR; INTRAMUSCULAR
Status: COMPLETED | OUTPATIENT
Start: 2024-05-06 | End: 2024-05-06

## 2024-05-06 RX ORDER — MIDODRINE HYDROCHLORIDE 10 MG/1
10 TABLET ORAL 3 TIMES DAILY PRN
Qty: 270 TABLET | Refills: 1 | Status: SHIPPED | OUTPATIENT
Start: 2024-05-06 | End: 2024-05-30 | Stop reason: SDUPTHER

## 2024-05-06 RX ADMIN — TRIAMCINOLONE ACETONIDE 40 MG: 40 INJECTION, SUSPENSION INTRA-ARTICULAR; INTRAMUSCULAR at 01:05

## 2024-05-06 NOTE — PROGRESS NOTES
Verified pt by name and . NKDA. Per physician orders pt was administered Kenalog 40 IM to left ventrogluteal using aseptic technique. Pt tolerated well. No adverse effects or pain reported. MD notified.

## 2024-05-06 NOTE — PROGRESS NOTES
Subjective:       Patient ID: Jarrett Charlton Jr. is a 74 y.o. male.    Chief Complaint: Follow-up (hospital)    HPI  patient visit today for for follow-up from hospital for paroxysmal ventricular tachycardia status post ablation procedure seemed to be successful patient currently doing better he has a episode of low blood pressure presyncope syncope from high output ileostomy but that has been under control in the last few months he has history of BPH he is appointment with his urologist diabetes does with renal complication hypertension hyperlipidemia his blood pressure has been well control no further episode of hypotension he took midodrine p.r.n.  Review of Systems   Constitutional:  Negative for unexpected weight change.   Eyes:  Negative for visual disturbance.   Respiratory:  Positive for cough and shortness of breath.    Cardiovascular:  Negative for chest pain and palpitations.   Gastrointestinal:  Negative for abdominal pain.   Endocrine: Negative for cold intolerance and heat intolerance.   Genitourinary:  Negative for frequency and urgency.   Musculoskeletal:  Positive for arthralgias.   Allergic/Immunologic: Negative for environmental allergies and food allergies.   Psychiatric/Behavioral:  Negative for dysphoric mood.        Objective:      Physical Exam  Vitals and nursing note reviewed.   Constitutional:       General: He is not in acute distress.     Appearance: He is well-developed.   HENT:      Head: Normocephalic and atraumatic.      Right Ear: External ear normal.      Left Ear: External ear normal.      Nose: Nose normal.      Mouth/Throat:      Pharynx: No oropharyngeal exudate.   Eyes:      Extraocular Movements: Extraocular movements intact.      Conjunctiva/sclera: Conjunctivae normal.      Pupils: Pupils are equal, round, and reactive to light.   Neck:      Thyroid: No thyromegaly.   Cardiovascular:      Rate and Rhythm: Normal rate and regular rhythm.      Heart sounds: Normal heart  sounds. No murmur heard.     No friction rub. No gallop.   Pulmonary:      Effort: Pulmonary effort is normal. No respiratory distress.      Breath sounds: Normal breath sounds. No wheezing or rales.   Abdominal:      General: Bowel sounds are normal. There is no distension.      Palpations: Abdomen is soft.      Tenderness: There is no abdominal tenderness.   Musculoskeletal:         General: No tenderness or deformity. Normal range of motion.      Cervical back: Normal range of motion and neck supple.   Lymphadenopathy:      Cervical: No cervical adenopathy.   Skin:     General: Skin is warm and dry.      Findings: No erythema or rash.   Neurological:      Mental Status: He is alert and oriented to person, place, and time.   Psychiatric:         Mood and Affect: Mood normal.         Thought Content: Thought content normal.         Judgment: Judgment normal.         Assessment:       1. Type 2 diabetes mellitus with stage 3 chronic kidney disease, without long-term current use of insulin, unspecified whether stage 3a or 3b CKD    2. Syncope due to orthostatic hypotension    3. Colostomy present    4. Type 2 diabetes mellitus with diabetic polyneuropathy, without long-term current use of insulin    5. Essential hypertension    6. CKD (chronic kidney disease), stage IV    7. Dysautonomia    8. Hypertensive chronic kidney disease with stage 5 chronic kidney disease or end stage renal disease    9. High output ileostomy    10. Paroxysmal ventricular tachycardia    11. Prostate cancer    12. Pure hypercholesterolemia        Plan:       Type 2 diabetes mellitus with stage 3 chronic kidney disease, without long-term current use of insulin, unspecified whether stage 3a or 3b CKD  -     Diabetic Eye Screening Photo; Future  -     glimepiride (AMARYL) 4 MG tablet; Take 1 tablet (4 mg total) by mouth 2 (two) times daily before meals.  Dispense: 180 tablet; Refill: 1  -     triamcinolone acetonide injection 40 mg  -      Diabetes Digital Medicine (DDMP) Enrollment Order    Syncope due to orthostatic hypotension  Comments:  Patient currently doing well with IV fluid weekly and mitral drain p.r.n. has not been in the ER for presyncope for while  Orders:  -     midodrine (PROAMATINE) 10 MG tablet; Take 1 tablet (10 mg total) by mouth 3 (three) times daily as needed (Hypotension).  Dispense: 270 tablet; Refill: 1    Colostomy present    Type 2 diabetes mellitus with diabetic polyneuropathy, without long-term current use of insulin  -     Hemoglobin A1C; Future; Expected date: 05/07/2024  -     Urinalysis; Future; Expected date: 05/07/2024    Essential hypertension  Comments:  A blood pressure well control continue with treatment  Orders:  -     CBC Auto Differential; Future; Expected date: 05/07/2024  -     Comprehensive Metabolic Panel; Future; Expected date: 05/07/2024  -     Hypertension Digital Medicine (HDMP) Enrollment Order    CKD (chronic kidney disease), stage IV  Comments:  Worsening a renal insufficiency last week blood tests will repeat  Orders:  -     Comprehensive Metabolic Panel; Future; Expected date: 05/07/2024    Dysautonomia    Hypertensive chronic kidney disease with stage 5 chronic kidney disease or end stage renal disease    High output ileostomy  Comments:  May need more IV fluid with recent increase in creatinine    Paroxysmal ventricular tachycardia  Comments:  A status post ablation follow-up with cardiology    Prostate cancer  Comments:  Patient has appointment with Urology  Orders:  -     PROSTATE SPECIFIC ANTIGEN, DIAGNOSTIC; Future; Expected date: 05/07/2024    Pure hypercholesterolemia  -     Lipids Digital Medicine (LDMP) Enrollment Order  -     Lipids Digital Medicine (LDMP): Assign Onboarding Questionnaires        Medication List with Changes/Refills   Current Medications    ACCU-CHEK PAUL CONTROL SOLN SOLN    1 drop by NOT APPLICABLE route once daily.    ACCU-CHEK GUIDE TEST STRIPS STRP    TEST BLOOD  SUGAR THREE TIMES DAILY    ACCU-CHEK SOFTCLIX LANCETS [facility] [place]    TEST BLOOD SUGAR THREE TIMES DAILY    ALLOPURINOL (ZYLOPRIM) 300 MG TABLET    Take 1.5 tablets (450 mg total) by mouth once daily.    ASPIRIN (ECOTRIN) 81 MG EC TABLET    Take 1 tablet (81 mg total) by mouth once daily.    BLOOD-GLUCOSE METER (ACCU-CHEK GUIDE GLUCOSE METER) [facility] [place]    Accucheck BID    BRIMONIDINE 0.2% (ALPHAGAN) 0.2 % DROP    Place 1 drop into both eyes 2 (two) times a day.    CALCIUM-VITAMIN D3 (OS-DAKOTA 500 + D3) 500 MG-5 MCG (200 UNIT) PER TABLET    Take 1 tablet by mouth once daily.    CAPSAICIN 0.1 % CREA    Apply painful joint TID    COLCHICINE (COLCRYS) 0.6 MG TABLET    Take 2 then 1 one hour later prn gout can repeta in 2 weeks prn    DROPSAFE ALCOHOL PREP PADS PADM    USE TOPICALLY 5 TIMES DAILY.    FERROUS SULFATE (IRON) 325 MG (65 MG IRON) TAB TABLET    Take 1 tablet (325 mg total) by mouth every other day.    FLUDROCORTISONE (FLORINEF) 0.1 MG TAB    Take 1 tablet (100 mcg total) by mouth 2 (two) times daily.    GABAPENTIN (NEURONTIN) 600 MG TABLET    Take 1 tablet (600 mg total) by mouth 3 (three) times daily.    LIDOCAINE (LIDODERM) 5 %    Place 1 patch onto the skin as needed. Remove & Discard patch within 12 hours or as directed by MD    MAGNESIUM OXIDE (MAG-OX) 400 MG (241.3 MG MAGNESIUM) TABLET    TAKE 1 TABLET EVERY DAY    PANTOPRAZOLE (PROTONIX) 40 MG TABLET    Take 1 tablet (40 mg total) by mouth once daily.    PRAVASTATIN (PRAVACHOL) 40 MG TABLET    Take 1 tablet (40 mg total) by mouth once daily.    PREDNISONE (DELTASONE) 2.5 MG TABLET    Take 1 tablet (2.5 mg total) by mouth once daily.    PYRIDOSTIGMINE BROMIDE 30 MG TAB    Take 30 mg by mouth every 8 (eight) hours.    SODIUM BICARBONATE 650 MG TABLET    Take 2 tablets (1,300 mg total) by mouth 2 (two) times daily.   Changed and/or Refilled Medications    Modified Medication Previous Medication    GLIMEPIRIDE (AMARYL) 4 MG TABLET glimepiride (AMARYL) 4 MG tablet        Take 1 tablet (4 mg total) by mouth 2 (two) times daily before meals.    TAKE 1 TABLET TWICE DAILY BEFORE MEALS    MIDODRINE (PROAMATINE) 10 MG TABLET midodrine (PROAMATINE) 10 MG tablet       Take 1 tablet (10 mg total) by mouth 3 (three) times daily as needed (Hypotension).    Take 1 tablet (10 mg total) by mouth 3 (three) times daily as needed (Hypotension).   Discontinued Medications    CHOLESTYRAMINE-ASPARTAME (QUESTRAN LIGHT) 4 GRAM PWPK    Take 1 packet (4 g total) by mouth 3 (three) times daily with meals.    LEVOCETIRIZINE (XYZAL) 5 MG TABLET    Take 1 tablet (5 mg total) by mouth every evening.

## 2024-05-06 NOTE — Clinical Note
A please ask patient to have blood test done soon since his last blood test for kidney has been worsening

## 2024-05-07 ENCOUNTER — PATIENT MESSAGE (OUTPATIENT)
Dept: CARDIOLOGY | Facility: CLINIC | Age: 74
End: 2024-05-07
Payer: MEDICARE

## 2024-05-07 ENCOUNTER — INFUSION (OUTPATIENT)
Dept: INFECTIOUS DISEASES | Facility: HOSPITAL | Age: 74
End: 2024-05-07
Payer: MEDICARE

## 2024-05-07 VITALS
TEMPERATURE: 98 F | DIASTOLIC BLOOD PRESSURE: 99 MMHG | HEART RATE: 92 BPM | BODY MASS INDEX: 26.51 KG/M2 | WEIGHT: 206.56 LBS | SYSTOLIC BLOOD PRESSURE: 169 MMHG | OXYGEN SATURATION: 97 % | HEIGHT: 74 IN | RESPIRATION RATE: 18 BRPM

## 2024-05-07 DIAGNOSIS — E11.22 TYPE 2 DIABETES MELLITUS WITH STAGE 3B CHRONIC KIDNEY DISEASE, WITHOUT LONG-TERM CURRENT USE OF INSULIN: Primary | ICD-10-CM

## 2024-05-07 DIAGNOSIS — E86.0 DEHYDRATION: ICD-10-CM

## 2024-05-07 DIAGNOSIS — Z93.3 COLOSTOMY PRESENT: ICD-10-CM

## 2024-05-07 DIAGNOSIS — N18.32 TYPE 2 DIABETES MELLITUS WITH STAGE 3B CHRONIC KIDNEY DISEASE, WITHOUT LONG-TERM CURRENT USE OF INSULIN: Primary | ICD-10-CM

## 2024-05-07 PROBLEM — G90.1 DYSAUTONOMIA: Status: ACTIVE | Noted: 2024-05-07

## 2024-05-07 PROCEDURE — 96360 HYDRATION IV INFUSION INIT: CPT | Mod: HCNC

## 2024-05-07 PROCEDURE — 25000003 PHARM REV CODE 250: Mod: HCNC | Performed by: STUDENT IN AN ORGANIZED HEALTH CARE EDUCATION/TRAINING PROGRAM

## 2024-05-07 RX ORDER — HEPARIN 100 UNIT/ML
500 SYRINGE INTRAVENOUS
Status: CANCELLED | OUTPATIENT
Start: 2024-05-10

## 2024-05-07 RX ORDER — SODIUM CHLORIDE 0.9 % (FLUSH) 0.9 %
10 SYRINGE (ML) INJECTION
Status: CANCELLED | OUTPATIENT
Start: 2024-05-10

## 2024-05-07 RX ADMIN — SODIUM CHLORIDE 1000 ML: 9 INJECTION, SOLUTION INTRAVENOUS at 08:05

## 2024-05-08 ENCOUNTER — TELEPHONE (OUTPATIENT)
Dept: PRIMARY CARE CLINIC | Facility: CLINIC | Age: 74
End: 2024-05-08
Payer: MEDICARE

## 2024-05-08 NOTE — TELEPHONE ENCOUNTER
Notified patient of ordered labs, patient stated he will have them drawn first thing tomorrow morning 5/9/24

## 2024-05-08 NOTE — TELEPHONE ENCOUNTER
----- Message from Poli Gonzalez MD sent at 5/7/2024  3:17 AM CDT -----  A please ask patient to have blood test done soon since his last blood test for kidney has been worsening

## 2024-05-10 ENCOUNTER — TELEPHONE (OUTPATIENT)
Dept: PRIMARY CARE CLINIC | Facility: CLINIC | Age: 74
End: 2024-05-10
Payer: MEDICARE

## 2024-05-10 ENCOUNTER — INFUSION (OUTPATIENT)
Dept: INFECTIOUS DISEASES | Facility: HOSPITAL | Age: 74
End: 2024-05-10
Payer: MEDICARE

## 2024-05-10 ENCOUNTER — OFFICE VISIT (OUTPATIENT)
Dept: UROLOGY | Facility: CLINIC | Age: 74
End: 2024-05-10
Payer: MEDICARE

## 2024-05-10 VITALS
SYSTOLIC BLOOD PRESSURE: 181 MMHG | BODY MASS INDEX: 26.31 KG/M2 | DIASTOLIC BLOOD PRESSURE: 103 MMHG | HEART RATE: 72 BPM | WEIGHT: 205 LBS | HEIGHT: 74 IN

## 2024-05-10 VITALS
HEIGHT: 74 IN | RESPIRATION RATE: 18 BRPM | SYSTOLIC BLOOD PRESSURE: 181 MMHG | OXYGEN SATURATION: 96 % | BODY MASS INDEX: 26.52 KG/M2 | DIASTOLIC BLOOD PRESSURE: 90 MMHG | HEART RATE: 88 BPM | TEMPERATURE: 99 F

## 2024-05-10 DIAGNOSIS — C61 PROSTATE CANCER: Primary | ICD-10-CM

## 2024-05-10 DIAGNOSIS — N18.32 TYPE 2 DIABETES MELLITUS WITH STAGE 3B CHRONIC KIDNEY DISEASE, WITHOUT LONG-TERM CURRENT USE OF INSULIN: Primary | ICD-10-CM

## 2024-05-10 DIAGNOSIS — Z93.3 COLOSTOMY PRESENT: ICD-10-CM

## 2024-05-10 DIAGNOSIS — E11.22 TYPE 2 DIABETES MELLITUS WITH STAGE 3B CHRONIC KIDNEY DISEASE, WITHOUT LONG-TERM CURRENT USE OF INSULIN: Primary | ICD-10-CM

## 2024-05-10 DIAGNOSIS — E86.0 DEHYDRATION: ICD-10-CM

## 2024-05-10 PROCEDURE — 1101F PT FALLS ASSESS-DOCD LE1/YR: CPT | Mod: HCNC,CPTII,S$GLB, | Performed by: UROLOGY

## 2024-05-10 PROCEDURE — 1159F MED LIST DOCD IN RCRD: CPT | Mod: HCNC,CPTII,S$GLB, | Performed by: UROLOGY

## 2024-05-10 PROCEDURE — 25000003 PHARM REV CODE 250: Mod: HCNC | Performed by: STUDENT IN AN ORGANIZED HEALTH CARE EDUCATION/TRAINING PROGRAM

## 2024-05-10 PROCEDURE — 3066F NEPHROPATHY DOC TX: CPT | Mod: HCNC,CPTII,S$GLB, | Performed by: UROLOGY

## 2024-05-10 PROCEDURE — 3051F HG A1C>EQUAL 7.0%<8.0%: CPT | Mod: HCNC,CPTII,S$GLB, | Performed by: UROLOGY

## 2024-05-10 PROCEDURE — 96360 HYDRATION IV INFUSION INIT: CPT | Mod: HCNC

## 2024-05-10 PROCEDURE — 99214 OFFICE O/P EST MOD 30 MIN: CPT | Mod: HCNC,S$GLB,, | Performed by: UROLOGY

## 2024-05-10 PROCEDURE — 99999 PR PBB SHADOW E&M-EST. PATIENT-LVL III: CPT | Mod: PBBFAC,HCNC,, | Performed by: UROLOGY

## 2024-05-10 PROCEDURE — 3080F DIAST BP >= 90 MM HG: CPT | Mod: HCNC,CPTII,S$GLB, | Performed by: UROLOGY

## 2024-05-10 PROCEDURE — 3077F SYST BP >= 140 MM HG: CPT | Mod: HCNC,CPTII,S$GLB, | Performed by: UROLOGY

## 2024-05-10 PROCEDURE — 3288F FALL RISK ASSESSMENT DOCD: CPT | Mod: HCNC,CPTII,S$GLB, | Performed by: UROLOGY

## 2024-05-10 RX ORDER — HEPARIN 100 UNIT/ML
500 SYRINGE INTRAVENOUS
Status: CANCELLED | OUTPATIENT
Start: 2024-05-14

## 2024-05-10 RX ORDER — SODIUM CHLORIDE 0.9 % (FLUSH) 0.9 %
10 SYRINGE (ML) INJECTION
Status: CANCELLED | OUTPATIENT
Start: 2024-05-14

## 2024-05-10 RX ADMIN — SODIUM CHLORIDE 1000 ML: 9 INJECTION, SOLUTION INTRAVENOUS at 08:05

## 2024-05-10 NOTE — TELEPHONE ENCOUNTER
Called patient and he informed me that the injection he was given, made his right hand swell.  It did not help.

## 2024-05-10 NOTE — PROGRESS NOTES
CC: elevated PSA with suspected prostate cancer    Jarrett Charlton Jr. is a 74 y.o. man who is here for Follow-up (Pt here for annual f/u. )      Jarrett Charlton Jr. is a 70 y.o. man who is here for the evaluation of elevated PSA.  He had Lupron injection treatment for suspected prostate cancer with rising PSA      a history of elevated PSA, negative TUR biopsy in January. History of APR for crohn's disease.  MRI- 15 ccs. PI-RADS 4, 1 cm lesion, right base PZ. Negative extra prostatic extension,NVB,SV nodes.     Hx of no rectum as well as concerning PIRADS lesion on MRI.   Had TURP for biopsying suspicious area      Date: 01/23/2019  Procedure(s) Performed:   TURP  Findings:   Open bladder neck some regrowth of adenoma and suspicious area in right mid prostate   Right side of prostate resected and sent for pathology  SPECIMEN  1) Right prostate chips.  FINAL PATHOLOGIC DIAGNOSIS  Right prostate gland, transurethral resection:  - Benign prostatic tissue with nodular stromal hyperplasia  - Negative for malignancy     Since TURP, he noted that he can void better with better urine flow.  Blood in urine resolved.  However, he reports Occasional ZEB but it got all better.  No more ZEB reported.  He is here with another MRI of prostate following TURP because of still rising PSA up to 6.3 from 4.1.     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  He was not interested in SUDS.  S/p colon surgery and his anus is closed.  Therefore dong TRUS bx of prostate is not feasible.  After discussion, we decided to try finasteride to see whether it will lower his PSA.   He has been on finasteride for the past couple of years. Following TURP in 1/2019, we decided to stop taking finasteride.     When he was considered to undergo TURP, we could not do his surgery because he was not able to stop ASA or Plavix due to fresh vascular stent.  He got  a clearance that he can hold off plavix and ASA prior to TURP.  Hx of ulcerative colitis, had colon surgery back in 1985 and his anus was closed.  Has a neuropathy on the right leg.  Hx of sciatic nerve on both sides and received injection on the back.  Hx of diabetes diagnosed 2 years ago.  No family hx of prostate cancer.  He is a .  Denies flank pain, dysuira, hematuria .       MRI of prostate 8/20/19  Previous biopsy: Negative on 01/23/2019  PSA: 6.3 ng/mL (08/16/2019)  Prior therapy: TURP  Prostate: 3.6 x 3.5 x 3.0 cm corresponding to a computed volume of 15.2 cc.  Peripheral zone: Focal abnormalities with imaging features concerning for prostate cancer.  Lesion (MARYCARMEN) #1  Location: Side: right; Region: base; Zone: posterior peripheral zone laterally  Greatest dimension: 1.0 cm  T2-WI: T2 SI: circumscribed, homogeneous moderate hypointensity--score 4 or 5  DWI/ADC: DWI: Focal markedly hypointense on ADC and markedly hyperintense on high b-value DWI--score 4 or 5  DCE: Positive  Extraprostatic extension: No gross extraprostatic extension noting postsurgical changes limits evaluation.  PI-RADS assessment category: 4    Transitional zone: Mostly surgically resected.  Neurovascular bundle: Normal.  Seminal vesicles:  SV invasion: None  Adjacent Organ Involvement: No focal bladder wall thickening.  No rectal involvement.  Lymphadenopathy: None     On 11/22/19, pt underwent perineal biopsy by IR for CT guided needle bx for the right posterolateral prostate lesion;  Elevated prostate specific antigen (PSA)  Pathology:  BIOPSY OF PROSTATE BED:  FIBROFATTY TISSUE WITH NO NEOPLASIA IDENTIFIED     Pt had his PSA repeated and is now elevated to 7.9.  He was very anxious about possible prostate cancer and would like to to something about it.  So we have started ADT.  He is here with PSA follow up.  Denies any problems. No hot flashes, decreased energy level.    Past Medical History:   Diagnosis Date    Basal cell  carcinoma     BPH (benign prostatic hyperplasia)     s/p TURP    Carotid stenosis     Chronic kidney disease     Claudication     Coronary artery disease     DDD (degenerative disc disease) 10/21/2013    Diabetes mellitus with renal complications     Disc disease, degenerative, cervical     Encounter for blood transfusion     GERD (gastroesophageal reflux disease)     Gout, chronic     History of ulcerative colitis     s/p colectomy and ileostomy    HLD (hyperlipidemia)     HTN (hypertension)     Ileostomy in place 1982    RBBB     Squamous cell carcinoma 03/08/2018    Left superior helix near insertion    Squamous cell carcinoma 04/12/2018    Left forearm x 5    Ventricular tachycardia      Past Surgical History:   Procedure Laterality Date    ABLATION, SVT, ACCESSORY PATHWAY N/A 4/30/2024    Procedure: Ablation, SVT, Accessory Pathway;  Surgeon: Franky Taylor MD;  Location: [facility] EP LAB;  Service: Cardiology;  Laterality: N/A;  VT, RFA, Carto, MAC, GP, 3 Prep *MDT ILR in situ*    cardiac stents      CATARACT EXTRACTION Bilateral     CERVICAL FUSION      CHOLECYSTECTOMY N/A 2/14/2023    Procedure: CHOLECYSTECTOMY;  Surgeon: Mike Joyce MD;  Location: [facility];  Service: General;  Laterality: N/A;  very high probabilty of converison to open    colectomy and ileostomy  1985    ENDOSCOPIC ULTRASOUND OF UPPER GASTROINTESTINAL TRACT N/A 2/10/2023    Procedure: ULTRASOUND, UPPER GI TRACT, ENDOSCOPIC;  Surgeon: Poli Fuller MD;  Location: [facility] ENDO;  Service: Endoscopy;  Laterality: N/A;    ERCP N/A 2/10/2023    Procedure: ERCP (ENDOSCOPIC RETROGRADE CHOLANGIOPANCREATOGRAPHY);  Surgeon: Poli Fuller MD;  Location: [facility] ENDO;  Service: Endoscopy;  Laterality: N/A;    EXCISION OF PAROTID GLAND Left 12/18/2020    Procedure: EXCISION, PAROTID GLAND;  Surgeon: Michael Pinzon MD;  Location: [address] FLR;  Service: ENT;  Laterality: Left;    INSERTION OF IMPLANTABLE LOOP RECORDER  06/07/2021    INSERTION OF  IMPLANTABLE LOOP RECORDER Left 6/7/2021    Procedure: INSERTION, IMPLANTABLE LOOP RECORDER;  Surgeon: Romario Dao MD;  Location: [facility] CATH LAB;  Service: Cardiology;  Laterality: Left;    LEFT HEART CATHETERIZATION Right 4/15/2021    Procedure: CATHETERIZATION, HEART, LEFT;  Surgeon: Marcio Jones MD;  Location: [facility] CATH LAB;  Service: Cardiology;  Laterality: Right;    LYSIS OF ADHESIONS N/A 11/9/2020    Procedure: LYSIS, ADHESIONS,  ERAS low;  Surgeon: CED Haley MD;  Location: [facility] OR 2ND FLR;  Service: Colon and Rectal;  Laterality: N/A;    LYSIS OF ADHESIONS N/A 2/14/2023    Procedure: LYSIS, ADHESIONS;  Surgeon: Mike Joyce MD;  Location: [facility];  Service: General;  Laterality: N/A;    POUCHOSCOPY N/A 4/6/2022    Procedure: ENDOSCOPY, POUCH, SMALL INTESTINE, DIAGNOSTIC;  Surgeon: Dieter Juarez MD;  Location: [facility] ENDO (2ND FLR);  Service: Endoscopy;  Laterality: N/A;    REPAIR, HERNIA, PARASTOMAL N/A 11/9/2020    Procedure: REPAIR, HERNIA, PARASTOMAL;  Surgeon: CED Haley MD;  Location: [facility] OR 2ND FLR;  Service: Colon and Rectal;  Laterality: N/A;    TRANSURETHRAL RESECTION OF PROSTATE (TURP) WITHOUT USE OF LASER N/A 1/23/2019    Procedure: TURP, WITHOUT USING LASER BIPOLAR;  Surgeon: Catarino Mota MD;  Location: [facility] OR 1ST FLR;  Service: Urology;  Laterality: N/A;  1.5 HOURS    TREATMENT OF CARDIAC ARRHYTHMIA  4/30/2024    Procedure: Cardioversion or Defibrillation;  Surgeon: Franky Taylor MD;  Location: [facility] EP LAB;  Service: Cardiology;;     Social History     Tobacco Use    Smoking status: Never    Smokeless tobacco: Never   Substance Use Topics    Alcohol use: No    Drug use: No     Family History   Problem Relation Name Age of Onset    Cancer Father      Diabetes Father      Dementia Mother      Melanoma Neg Hx      Hypertension Neg Hx      Arthritis Neg Hx       Allergy:  Review of patient's allergies indicates:   Allergen Reactions    Crestor [rosuvastatin]      Lipitor [atorvastatin]      Outpatient Encounter Medications as of 5/10/2024   Medication Sig Dispense Refill    ACCU-CHEK PAUL CONTROL SOLN Soln 1 drop by NOT APPLICABLE route once daily.      ACCU-CHEK GUIDE TEST STRIPS Str[place] TEST BLOOD SUGAR THREE TIMES DAILY 300 strip 10    ACCU-CHEK SOFTCLIX LANCETS [place] TEST BLOOD SUGAR THREE TIMES DAILY 300 each 3    allopurinoL (ZYLOPRIM) 300 MG tablet Take 1.5 tablets (450 mg total) by mouth once daily. 135 tablet 3    aspirin (ECOTRIN) 81 MG EC tablet Take 1 tablet (81 mg total) by mouth once daily. 30 tablet 11    blood-glucose meter (ACCU-CHEK GUIDE GLUCOSE METER) [place] Accucheck BID 1 each 0    brimonidine 0.2% (ALPHAGAN) 0.2 % Drop Place 1 drop into both eyes 2 (two) times a day. 10 mL 5    calcium-vitamin D3 (OS-DAKOTA 500 + D3) 500 mg-5 mcg (200 unit) per tablet Take 1 tablet by mouth once daily. 30 tablet 1    capsaicin 0.1 % Crea Apply painful joint TID 56.6 g 1    colchicine (COLCRYS) 0.6 mg tablet Take 2 then 1 one hour later prn gout can repeta in 2 weeks prn 30 tablet 1    DROPSAFE ALCOHOL PREP PADS PadM USE TOPICALLY 5 TIMES DAILY. 500 each 3    ferrous sulfate (IRON) 325 mg (65 mg iron) Tab tablet Take 1 tablet (325 mg total) by mouth every other day. 15 tablet 11    fludrocortisone (FLORINEF) 0.1 mg Tab Take 1 tablet (100 mcg total) by mouth 2 (two) times daily. 180 tablet 3    gabapentin (NEURONTIN) 600 MG tablet Take 1 tablet (600 mg total) by mouth 3 (three) times daily. 270 tablet 1    glimepiride (AMARYL) 4 MG tablet Take 1 tablet (4 mg total) by mouth 2 (two) times daily before meals. 180 tablet 1    LIDOcaine (LIDODERM) 5 % Place 1 patch onto the skin as needed. Remove & Discard patch within 12 hours or as directed by MD 30 patch 0    magnesium oxide (MAG-OX) 400 mg (241.3 mg magnesium) tablet TAKE 1 TABLET EVERY DAY 90 tablet 3    midodrine (PROAMATINE) 10 MG tablet Take 1 tablet (10 mg total) by mouth 3 (three) times daily as needed (Hypotension).  270 tablet 1    pantoprazole (PROTONIX) 40 MG tablet Take 1 tablet (40 mg total) by mouth once daily. 90 tablet 1    pravastatin (PRAVACHOL) 40 MG tablet Take 1 tablet (40 mg total) by mouth once daily. 90 tablet 3    predniSONE (DELTASONE) 2.5 MG tablet Take 1 tablet (2.5 mg total) by mouth once daily. 90 tablet 0    sodium bicarbonate 650 MG tablet Take 2 tablets (1,300 mg total) by mouth 2 (two) times daily. 360 tablet 3    [DISCONTINUED] cephALEXin (KEFLEX) 500 MG capsule Take 1 capsule (500 mg total) by mouth 4 (four) times daily. 40 capsule 0    [DISCONTINUED] cholestyramine-aspartame (QUESTRAN LIGHT) 4 gram PwPk Take 1 packet (4 g total) by mouth 3 (three) times daily with meals. (Patient not taking: Reported on 2/22/2024) 270 packet 1    [DISCONTINUED] glimepiride (AMARYL) 4 MG tablet TAKE 1 TABLET TWICE DAILY BEFORE MEALS 180 tablet 1    [DISCONTINUED] levocetirizine (XYZAL) 5 MG tablet Take 1 tablet (5 mg total) by mouth every evening. (Patient not taking: Reported on 5/6/2024) 30 tablet 11    [DISCONTINUED] magnesium oxide (MAG-OX) 400 mg (241.3 mg magnesium) tablet Take 1 tablet (400 mg total) by mouth once daily. 30 tablet 2    [DISCONTINUED] midodrine (PROAMATINE) 10 MG tablet Take 1 tablet (10 mg total) by mouth 3 (three) times daily as needed (Hypotension). 270 tablet 1    [DISCONTINUED] pyRIDostigmine bromide 30 mg Tab Take 30 mg by mouth every 8 (eight) hours. (Patient not taking: Reported on 3/5/2024) 90 tablet 1     Facility-Administered Encounter Medications as of 5/10/2024   Medication Dose Route Frequency Provider Last Rate Last Admin    leuprolide acetate (6 month) injection 45 mg  45 mg Intramuscular Q6 Months         [COMPLETED] sodium chloride 0.9% bolus 1,000 mL 1,000 mL  1,000 mL Intravenous 1 time in Clinic/HOD Jean Claude Logan Jr., MD   Stopped at 05/10/24 0917    sodium chloride 0.9% in 1,000 mL infusion   Intravenous Continuous Order, Paper         Review of Systems    ROS  Physical Exam     Vitals:    05/10/24 1048   BP: (!) 181/103   Pulse: 72         Physical Exam  Genitalia:  Scrotum: no rash or lesion  Normal symmetric epididymis without masses  Normal vas palpated  Normal size, symmetric testicles with no masses   Normal urethral meatus with no discharge  Normal circumcised penis with no lesion   Rectal:  Normal perineum and anus upon inspection.  Normal tone, no masses or tenderness;     LABS:  Lab Results   Component Value Date    PSA 0.18 11/19/2020    PSA 7.9 (H) 04/20/2020    PSA 2.1 09/15/2014    PSA 2.16 05/13/2013    PSA 1.2 03/19/2012    PSA 1.0 02/19/2010    PSA 1.4 02/17/2009    PSA 1.1 04/30/2007    PSA 1.0 05/12/2006    PSA 1.0 04/22/2005    PSADIAG 2.4 05/09/2024    PSADIAG 1.5 01/10/2024    PSADIAG 0.69 09/08/2023    PSADIAG 0.08 11/22/2022    PSADIAG 0.03 03/04/2022    PSADIAG 0.03 12/16/2021    PSADIAG 0.02 10/29/2021    PSADIAG 0.02 09/14/2021    PSADIAG 0.04 05/25/2021    PSADIAG 0.10 02/11/2021     Results for orders placed or performed in visit on 05/09/24   PROSTATE SPECIFIC ANTIGEN, DIAGNOSTIC   Result Value Ref Range    PSA Diagnostic 2.4 0.00 - 4.00 ng/mL   Results for orders placed or performed in visit on 01/10/24   PROSTATE SPECIFIC ANTIGEN, DIAGNOSTIC   Result Value Ref Range    PSA Diagnostic 1.5 0.00 - 4.00 ng/mL   Results for orders placed or performed in visit on 09/08/23   Prostate Specific Antigen, Diagnostic   Result Value Ref Range    PSA Diagnostic 0.69 0.00 - 4.00 ng/mL     Lab Results   Component Value Date    CREATININE 3.1 (H) 05/09/2024    CREATININE 3.8 (H) 04/22/2024    CREATININE 2.6 (H) 03/06/2024     Results for orders placed or performed in visit on 10/26/23   Testosterone   Result Value Ref Range    Testosterone, Total 29 (L) 304 - 1227 ng/dL   Results for orders placed or performed in visit on 11/22/22   Testosterone   Result Value Ref Range    Testosterone, Total 43 (L) 304 - 1227 ng/dL     *Note: Due to a large number of  results and/or encounters for the requested time period, some results have not been displayed. A complete set of results can be found in Results Review.     Urine Culture, Routine   Date Value Ref Range Status   02/20/2019 No significant growth  Final     Hemoglobin A1C   Date Value Ref Range Status   05/09/2024 7.0 (H) 4.0 - 5.6 % Final     Comment:     ADA Screening Guidelines:  5.7-6.4%  Consistent with prediabetes  >or=6.5%  Consistent with diabetes    High levels of fetal hemoglobin interfere with the HbA1C  assay. Heterozygous hemoglobin variants (HbS, HgC, etc)do  not significantly interfere with this assay.   However, presence of multiple variants may affect accuracy.     01/05/2024 6.6 (H) 4.0 - 5.6 % Final     Comment:     ADA Screening Guidelines:  5.7-6.4%  Consistent with prediabetes  >or=6.5%  Consistent with diabetes    High levels of fetal hemoglobin interfere with the HbA1C  assay. Heterozygous hemoglobin variants (HbS, HgC, etc)do  not significantly interfere with this assay.   However, presence of multiple variants may affect accuracy.         Radiology:  MRI of prostate 8/20/19  Previous biopsy: Negative on 01/23/2019  PSA: 6.3 ng/mL (08/16/2019)  Prior therapy: TURP  Prostate: 3.6 x 3.5 x 3.0 cm corresponding to a computed volume of 15.2 cc.  Peripheral zone: Focal abnormalities with imaging features concerning for prostate cancer.  Lesion (MARYCARMEN) #1  Location: Side: right; Region: base; Zone: posterior peripheral zone laterally  Greatest dimension: 1.0 cm  T2-WI: T2 SI: circumscribed, homogeneous moderate hypointensity--score 4 or 5  DWI/ADC: DWI: Focal markedly hypointense on ADC and markedly hyperintense on high b-value DWI--score 4 or 5  DCE: Positive  Extraprostatic extension: No gross extraprostatic extension noting postsurgical changes limits evaluation.  PI-RADS assessment category: 4    Transitional zone: Mostly surgically resected.  Neurovascular bundle: Normal.  Seminal vesicles:  SV  "invasion: None  Adjacent Organ Involvement: No focal bladder wall thickening.  No rectal involvement.  Lymphadenopathy: None  Assessment and Plan:  Jarrett was seen today for follow-up.    Diagnoses and all orders for this visit:    Prostate cancer  -     leuprolide acetate (6 month) injection 45 mg  -     Prior authorization Order  -     Prostate Specific Antigen, Diagnostic; Future          hx of "suspected prostate cancer" with abnormal MRI of prostate with rising PSA up to 7.9 in 4/20/20.  Never proved that he has a prostate cancer with tissue diagnosis despite TURP.  Unable to perform prostate biopsy because hs anus is closed off.    his PSA is markedly down since Lupron injection   He received two doses of Lupron injection so far.  Last injection was given 12/20/20.    His PSA is rising and his PSA doubling time is less than 9 months.  He would like to resume Lupron injection.  Will have him come as a nurse visit for lupron injection.      I spent 25 minutes with the patient of which more than half was spent in direct consultation with the patient in regards to our treatment and plan.      Follow-up:  Follow up in about 6 months (around 11/10/2024) for PSA.          "

## 2024-05-11 NOTE — TELEPHONE ENCOUNTER
Ask pt make sure it is not from the IV tand take picture and text to us steroid injection kenalog not known to cause hand swelll up

## 2024-05-13 ENCOUNTER — OFFICE VISIT (OUTPATIENT)
Dept: PRIMARY CARE CLINIC | Facility: CLINIC | Age: 74
End: 2024-05-13
Payer: MEDICARE

## 2024-05-13 VITALS
RESPIRATION RATE: 18 BRPM | BODY MASS INDEX: 26.14 KG/M2 | SYSTOLIC BLOOD PRESSURE: 197 MMHG | DIASTOLIC BLOOD PRESSURE: 73 MMHG | HEIGHT: 74 IN | WEIGHT: 203.69 LBS

## 2024-05-13 DIAGNOSIS — M79.89 SWELLING OF RIGHT HAND: ICD-10-CM

## 2024-05-13 DIAGNOSIS — M79.641 RIGHT HAND PAIN: Primary | ICD-10-CM

## 2024-05-13 DIAGNOSIS — Z79.899 OTHER LONG TERM (CURRENT) DRUG THERAPY: ICD-10-CM

## 2024-05-13 DIAGNOSIS — Z87.39 HISTORY OF GOUT: ICD-10-CM

## 2024-05-13 PROCEDURE — 1159F MED LIST DOCD IN RCRD: CPT | Mod: HCNC,CPTII,S$GLB, | Performed by: STUDENT IN AN ORGANIZED HEALTH CARE EDUCATION/TRAINING PROGRAM

## 2024-05-13 PROCEDURE — 3077F SYST BP >= 140 MM HG: CPT | Mod: HCNC,CPTII,S$GLB, | Performed by: STUDENT IN AN ORGANIZED HEALTH CARE EDUCATION/TRAINING PROGRAM

## 2024-05-13 PROCEDURE — 1160F RVW MEDS BY RX/DR IN RCRD: CPT | Mod: HCNC,CPTII,S$GLB, | Performed by: STUDENT IN AN ORGANIZED HEALTH CARE EDUCATION/TRAINING PROGRAM

## 2024-05-13 PROCEDURE — 3066F NEPHROPATHY DOC TX: CPT | Mod: HCNC,CPTII,S$GLB, | Performed by: STUDENT IN AN ORGANIZED HEALTH CARE EDUCATION/TRAINING PROGRAM

## 2024-05-13 PROCEDURE — 99214 OFFICE O/P EST MOD 30 MIN: CPT | Mod: HCNC,S$GLB,, | Performed by: STUDENT IN AN ORGANIZED HEALTH CARE EDUCATION/TRAINING PROGRAM

## 2024-05-13 PROCEDURE — 1125F AMNT PAIN NOTED PAIN PRSNT: CPT | Mod: HCNC,CPTII,S$GLB, | Performed by: STUDENT IN AN ORGANIZED HEALTH CARE EDUCATION/TRAINING PROGRAM

## 2024-05-13 PROCEDURE — 99999 PR PBB SHADOW E&M-EST. PATIENT-LVL V: CPT | Mod: PBBFAC,HCNC,, | Performed by: STUDENT IN AN ORGANIZED HEALTH CARE EDUCATION/TRAINING PROGRAM

## 2024-05-13 PROCEDURE — 3051F HG A1C>EQUAL 7.0%<8.0%: CPT | Mod: HCNC,CPTII,S$GLB, | Performed by: STUDENT IN AN ORGANIZED HEALTH CARE EDUCATION/TRAINING PROGRAM

## 2024-05-13 PROCEDURE — 3078F DIAST BP <80 MM HG: CPT | Mod: HCNC,CPTII,S$GLB, | Performed by: STUDENT IN AN ORGANIZED HEALTH CARE EDUCATION/TRAINING PROGRAM

## 2024-05-13 RX ORDER — HYDRALAZINE HYDROCHLORIDE 25 MG/1
25 TABLET, FILM COATED ORAL 3 TIMES DAILY PRN
COMMUNITY
End: 2024-05-29 | Stop reason: SDUPTHER

## 2024-05-13 NOTE — TELEPHONE ENCOUNTER
Called patient and he informed me that it is not from the IV. He has an office visit this morning with Dr. Olson.

## 2024-05-13 NOTE — PROGRESS NOTES
HISTORY OF PRESENT ILLNESS:  This is a 65-year-old right-hand-dominant woman who is is coming in today for the proposed left lumbar radiofrequency ablation today.  She is a recipient of right lumbar radiofrequency ablation 2 weeks ago.  No others medical status changes have occurred.      She denies fevers or shortness of breath.  She denies numbness or weakness of the lower extremities.    She denies significant changes in her medical condition.  This is the same symptoms she has had prior to the previous radiofrequency ablation.  Some of the knee pain after the genicular radiofrequency ablation is starting to come back especially on the left side.  But she is still able to tolerate it for now.      She denies fevers.  She is able to walk with a walking stick.      She was discovered to have cataract since her last visit.    The patient's back pain has been worked up with MRI of the lumbar spine in 2018 at [facility].  She is now in the process of changing her primary care doctor.  She complains of frequent cramping sensation in the fingers and toes.  She takes magnesium on daily basis.  Patient drinks skim milk every day.  But she is not good about taking water.      She had been referred to the pain clinic by Dr. Auguste who had been giving her for the past 4 years combination of bilateral steroid and artificial cartilage injections to her knees.  She is bone-on-bone and especially in the medial compartment according to his notes.  The patient brings in the CD disc of the knee x-ray from [facility] from May 20, 2019.  Given the fact that she cannot get of anticoagulant therapy, she is not a  candidate for bilateral knee replacement surgery.  She is hoping to obtain some pain relief by use of nerve blocks.  Is worse on the left knee than the right.  She has a master bedroom on the second floor and so she has to do the steps.  Going down the steps is worse than going up the stairs.  She  Subjective:       Patient ID: Jarrtet Charlton Jr. is a 74 y.o. male.    Chief Complaint: swollen hand    HPI:  74 y.o. male presents to [facility] with complaints of swelling and pain to his right hand.    Patient reports symptoms began last week. Has been this was since saw Dr Gonzalez 5/6/2024. Was given steroid injection, but pain has not improved. Worse at night when going to bed and still. Not improved in morning. Unable to lay flat with pain from swelling. Pain can wax and wane during the day. Patient has stage IV CKD.     Onset?: ~5/6/2024  Progression?: Persistent  Inciting incident/new physical activity?: No known  Past trauma/surgery?: No  Recurrent?: Yes, began years ago. ~ 3 times yearly. Can linger if not seen  Description?: Strong ache/throbbing  Radiates?: No  Severity?: 10/10 at worst  Timing?: Wosr at night  Worsening factors?: Dependent position. Movement  Interventions?: triamcinolone 40 mg injection, has tried soaking  Did interventions help?: No  History of bariatric surgery, MI, renal disease, or GI bleed/ulcer?: CKD stage 4    On NSAIDS?: None  On baby ASA for?: For heart, taking for years  Taking midodrine?: Only taking when blood pressure is low.  Not on a calcium channel blocker.  No known autoimmune disease in family.  Patient can get gout in foot and ankle in past. Drinks water daily with kidneys. Hasn't had flare in some time. No recent beer or seafood intake. No wild game.    Swelling: Yes  Recurrent?:Yes  New medications?: No  Better with elevation/in morning?: No  Pain?: Yes  Sodium intake?: Limited  Carbohydrate loads?: No  Palpitations?: No  Restrictive clothing?: No  Snoring/stop breathing?: No    On allopurinol and was on colchicine.    Estimated Creatinine Clearance: 23.9 mL/min (A) (based on SCr of 3.1 mg/dL (H)).    Review of Systems   Constitutional:  Negative for chills, diaphoresis, fatigue and fever.   Respiratory:  Negative for cough and shortness of breath.   "  Cardiovascular:  Negative for chest pain and palpitations.   Gastrointestinal:  Positive for diarrhea (Roxanne output ileostomy). Negative for abdominal pain, nausea and vomiting.   Musculoskeletal:  Positive for arthralgias and joint swelling. Negative for myalgias.   Skin:  Negative for rash and wound.   Neurological:  Negative for dizziness, light-headedness and headaches.       Objective:      Vitals:    05/13/24 0841   BP: (!) 197/73   BP Location: Left arm   Patient Position: Sitting   BP Method: Medium (Manual)   Resp: 18   Weight: 92.4 kg (203 lb 11.3 oz)   Height: 6' 2" (1.88 m)     Physical Exam  Vitals reviewed.   Constitutional:       General: He is not in acute distress.     Appearance: Normal appearance. He is not ill-appearing.   HENT:      Head: Normocephalic and atraumatic.   Eyes:      General:         Right eye: No discharge.         Left eye: No discharge.      Conjunctiva/sclera: Conjunctivae normal.   Cardiovascular:      Rate and Rhythm: Normal rate.   Pulmonary:      Effort: Pulmonary effort is normal.   Musculoskeletal:         General: No deformity.      Right hand: Swelling and tenderness present. No deformity or lacerations. Decreased range of motion (Unable to touch 3rd to 1st digit due to swelling/pain). Normal strength. Normal capillary refill. Normal pulse.      Left hand: Normal.   Skin:     Coloration: Skin is not jaundiced or pale.   Neurological:      General: No focal deficit present.      Mental Status: He is alert and oriented to person, place, and time.   Psychiatric:         Mood and Affect: Mood normal.         Behavior: Behavior normal.             Lab Results   Component Value Date     05/09/2024    K 4.1 05/09/2024     05/09/2024    CO2 24 05/09/2024    BUN 46 (H) 05/09/2024    CREATININE 3.1 (H) 05/09/2024    ANIONGAP 15 05/09/2024     Lab Results   Component Value Date    HGBA1C 7.0 (H) 05/09/2024     Lab Results   Component Value Date     (H) " complains of a disturbed sleep pattern due to the severity of the pain.      The patient sees doctors at [place] [facility] for severe migraine headaches for which she takes 3 abortive therapy which includes the benzodiazepines and opioids as well as antipsychotics per month.    Physical Therapy history is pertinent with the fact that she saw a  from December 2018 through February 2019.    IMAGING PROCEDURE:      Right hip MRI from August 31 2018 did not show significant abnormalities.  Trace joint effusion was noted.    MRI of the lumbar spine on August 31, 2018 from [facility] showed L3-4 small bulging disc with annular fissure, facet arthropathy, ligamentum flavum infolding.  At L4-5 there is bilateral facet arthropathy condition with minimal bilateral foraminal stenosis.    ALLERGIES:    Allergies (1) Active Reaction  iodinated radiocontrast None Documented  dye      MEDICATIONS:    Patient History: Home Medications  milk thistle (milk thistle oral capsule) PO, qDay  05/28/2019 15:21  acetaminophen-hydrocodone (Norco 325 mg-10 mg oral tablet) 1 tab, PO, PRN, PRN Migraine, 0 Refill(s), Tablet  05/28/2019 15:20  chondroitin/glucosamine/methylsulfonylmethane (Glucosamine & Chondroitin with MSM oral tablet) 1 tab, PO, qDay, # 90 tab, Tablet  05/28/2019 15:18  docusate (Colace 100 mg oral capsule) 100 mg = 1 cap(s), PO, Cap, qDay  05/28/2019 15:18  ferrous sulfate 45 mg =, PO, qDay  05/28/2019 15:18  cholecalciferol (Vitamin D3 2000 units oral tablet) 2,000 Int. Units = 1 tab, PO, Tablet, qDay, # 75 tab  08/24/2018 12:30  cyanocobalamin (Vitamin B12 1000 mcg oral tablet) 1,000 mcg = 1 tab(s), PO, Tab, qDay, # 90 tab(s)  08/24/2018 12:30  Misc Medication 70 mg, SubQ, qMonth, aimovig for migrains  08/24/2018 12:30  biotin (Hair, Skin & Nails) 5 mg =, PO, BID, stop now per anesthesia  08/24/2018 12:30  ondansetron (Zofran ODT 8 mg oral tablet, disintegrating) 8 mg = 1 tab, PO, DIS Tablet,  10/23/2023     (H) 10/10/2023     (H) 09/27/2023       Lab Results   Component Value Date    WBC 6.90 05/09/2024    HGB 10.9 (L) 05/09/2024    HCT 34.5 (L) 05/09/2024     05/09/2024    GRAN 4.9 05/09/2024    GRAN 71.7 05/09/2024     Lab Results   Component Value Date    CHOL 187 11/27/2023    HDL 36 (L) 11/27/2023    LDLCALC 124.4 11/27/2023    TRIG 133 11/27/2023          Current Outpatient Medications:     ACCU-CHEK PAUL CONTROL SOLN Soln, 1 drop by NOT APPLICABLE route once daily., Disp: , Rfl:     ACCU-CHEK GUIDE TEST STRIPS Strp, TEST BLOOD SUGAR THREE TIMES DAILY, Disp: 300 strip, Rfl: 10    ACCU-CHEK SOFTCLIX LANCETS Misc, TEST BLOOD SUGAR THREE TIMES DAILY, Disp: 300 each, Rfl: 3    allopurinoL (ZYLOPRIM) 300 MG tablet, Take 1.5 tablets (450 mg total) by mouth once daily., Disp: 135 tablet, Rfl: 3    aspirin (ECOTRIN) 81 MG EC tablet, Take 1 tablet (81 mg total) by mouth once daily., Disp: 30 tablet, Rfl: 11    blood-glucose meter (ACCU-CHEK GUIDE GLUCOSE METER) [facility], Accucheck BID, Disp: 1 each, Rfl: 0    brimonidine 0.2% (ALPHAGAN) 0.2 % Drop, Place 1 drop into both eyes 2 (two) times a day., Disp: 10 mL, Rfl: 5    DROPSAFE ALCOHOL PREP PADS PadM, USE TOPICALLY 5 TIMES DAILY., Disp: 500 each, Rfl: 3    ferrous sulfate (IRON) 325 mg (65 mg iron) Tab tablet, Take 1 tablet (325 mg total) by mouth every other day., Disp: 15 tablet, Rfl: 11    fludrocortisone (FLORINEF) 0.1 mg Tab, Take 1 tablet (100 mcg total) by mouth 2 (two) times daily., Disp: 180 tablet, Rfl: 3    gabapentin (NEURONTIN) 600 MG tablet, Take 1 tablet (600 mg total) by mouth 3 (three) times daily., Disp: 270 tablet, Rfl: 1    glimepiride (AMARYL) 4 MG tablet, Take 1 tablet (4 mg total) by mouth 2 (two) times daily before meals., Disp: 180 tablet, Rfl: 1    hydrALAZINE (APRESOLINE) 25 MG tablet, Take 25 mg by mouth 3 (three) times daily as needed., Disp: , Rfl:     LIDOcaine (LIDODERM) 5 %, Place 1 patch onto the skin as  q6hr, PRN Nausea/Vomiting  06/21/2018 01:51  rivaroxaban (Xarelto 20 mg oral tablet) 20 mg = 1 tab, PO, Tablet, qPM  06/21/2018 01:51  haloperidol (haloperidol (Haldol) 1 mg oral tablet) 1 mg = 1 tab(s), PO, Tab, qOtherDay, PRN Migraine, take with congentin and ativan limit 2 days per week and 8 tablets per month  10/30/2015 16:23  LORazepam (Ativan 1 mg oral tablet) 1 mg = 1 tab, PO, Tablet, q12hr, PRN Migraine, take with haldol and cogentin  10/30/2015 16:23  benztropine (benztropine (Cogentin) 2 mg oral tablet) 2 mg = 1 tab(s), PO, Tab, q6hr, PRN Migraine, -take with 1mg halodol and ativan  -limit to 2 doses per day and 2 days per week  09/27/2015 23:48  levothyroxine (Synthroid 50 mcg (0.05 mg) oral tablet) 50 mcg = 1 tab, PO, Tablet, qDay  09/27/2015 23:48  topiramate (Topamax 100 mg oral tablet) 100 mg = 1 tab, PO, Tablet, qPM  09/27/2015 23:48  nebivolol (Bystolic 5 mg oral tablet) 5 mg =, PO, qPM  09/27/2015 23:48  ursodiol (ursodiol 500 mg oral tablet) 1,000 mg = 2 tab, PO, qAM  05/29/2015 08:04  ursodiol (ursodiol 500 mg oral tablet) 500 mg = 1 tab, PO, qPM  05/28/2015 21:27  diphenhydrAMINE 50 mg =, IM, q6hr, PRN Migraine  05/28/2015 09:53  .  PAST MEDICAL HISTORY: Anticoagulant therapy no longer on Coumadin but on Xarelto, clotting disorder of unknown origin, migraines, hypothyroidism, anemia, primary biliary cirrhosis, and the diagnosed SEBO essential tremors, right trochanteric bursitis condition, chronic osteoarthritis of the knees, cataracts.    PAST SURGICAL HISTORY: IVC MilesCLip, knee arthroscopy    FAMILY HISTORY:  Father Kidney cancer age 88.    MOther passsed away with ammeloid angiopathy age 79.   colorectal cancer in sister age 62 still living.    REVIEW OF SYSTEMS:  The patient is Anticoagulant therapy no longer on Coumadin but on Xarelto, clotting disorder of unknown origin, migraines, hypothyroidism, anemia, primary biliary cirrhosis, and the diagnosed SEBO essential tremors, right  needed. Remove & Discard patch within 12 hours or as directed by MD, Disp: 30 patch, Rfl: 0    magnesium oxide (MAG-OX) 400 mg (241.3 mg magnesium) tablet, TAKE 1 TABLET EVERY DAY, Disp: 90 tablet, Rfl: 3    midodrine (PROAMATINE) 10 MG tablet, Take 1 tablet (10 mg total) by mouth 3 (three) times daily as needed (Hypotension)., Disp: 270 tablet, Rfl: 1    pantoprazole (PROTONIX) 40 MG tablet, Take 1 tablet (40 mg total) by mouth once daily., Disp: 90 tablet, Rfl: 1    pravastatin (PRAVACHOL) 40 MG tablet, Take 1 tablet (40 mg total) by mouth once daily., Disp: 90 tablet, Rfl: 3    sodium bicarbonate 650 MG tablet, Take 2 tablets (1,300 mg total) by mouth 2 (two) times daily., Disp: 360 tablet, Rfl: 3    calcium-vitamin D3 (OS-DAKOTA 500 + D3) 500 mg-5 mcg (200 unit) per tablet, Take 1 tablet by mouth once daily., Disp: 30 tablet, Rfl: 1    colchicine (COLCRYS) 0.6 mg tablet, Take 2 then 1 one hour later prn gout can repeta in 2 weeks prn (Patient not taking: Reported on 5/13/2024), Disp: 30 tablet, Rfl: 1    Current Facility-Administered Medications:     leuprolide acetate (6 month) injection 45 mg, 45 mg, Intramuscular, Q6 Months,     Facility-Administered Medications Ordered in Other Visits:     sodium chloride 0.9% in 1,000 mL infusion, , Intravenous, Continuous, Order, Paper        Assessment:       1. Right hand pain    2. Swelling of right hand    3. History of gout    4. Other long term (current) drug therapy           Plan:       Right hand pain  Swelling of right hand  History of gout  Other long term (current) drug therapy  -     TSH; Future; Expected date: 05/13/2024  -     Urinalysis; Future; Expected date: 05/13/2024  -     NT-Pro Natriuretic Peptide; Future; Expected date: 05/13/2024  -     Uric Acid; Future; Expected date: 05/13/2024  -     Magnesium; Future; Expected date: 05/13/2024  -     GUADALUPE Screen w/Reflex; Future; Expected date: 05/13/2024  -     Rheumatoid Factor; Future; Expected date:  trochanteric bursitis condition, chronic osteoarthritis of the knees, cataracts.    The patient denies symptoms pertaining to  Constitutional,  cardiovascular, respiratory,  Genitourinary, neurological, psychiatric, endocrine, or immunological systems.    SOCIAL HISTORY:  The patient denies smoking and drinking.  She is  she has 2 kids.  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    Opioid risk tool score 0.      PHYSICAL EXAMINATION:    VITAL SIGNS:    She is 5 foot 10 and weighs 99 kg or 219 pounds  See Leah for today's vitals.      GENERAL:  This is a well-developed, well-nourished patient, who is not in acute distress.     LUNGS:  Clear.     HEART:  Sounds regular.     ABDOMEN:  Soft and nontender with normoactive bowel sounds.     NEUROLOGIC:  Cranial nerves 2 through 12 intact.  The patient is alert and oriented x3.     NECK:  Supple without any lymphadenopathy or thyromegaly.     MUSCULOSKELETAL:     She is walking with a walking stick.  Bilateral knee arthritic changes moderate.    Neurologically she is intact.  Clubbing cyanosis and edema are absent.  Dipesh's maneuver is negative.    She has hard time tiptoe and heel walk.  The range of motion increased pain with left lateral bending and lateral rotation maneuver causing low back pain.  She has significant bilateral facet loading tenderness without sciatic notch tenderness or trochanteric bursa tenderness today.  Neurologically straight leg raising is negative.  Dipesh's maneuver is negative.    IMPRESSION:  This is a patient with history of Anticoagulant therapy no longer on Coumadin but on Xarelto, clotting disorder of unknown origin, migraines, hypothyroidism, anemia, primary biliary cirrhosis, and the diagnosed SEBO essential tremors and for bilateral knee arthritis condition, cataracts, right trochanteric bursitis who is complaining of relapsed lower back pain particularly on the right side.  The patient did well with right-sided  radiofrequency ablation 2  05/13/2024  -     CYCLIC CITRUL PEPTIDE ANTIBODY, IGG; Future; Expected date: 05/13/2024  - Instructed on renal dosing colchicine. Instructions will be re-printed today's visit on appropriate dosing for renal function  - Low purine diet provided    RTC PRN  Dieter Olson MD   weeks ago.  We will proceed with a left-sided radiofrequency ablation today.  The target level will be L2-3, L3-4, L4-5, L5-S1 levels under fluoroscopic guidance.    I explained to the patient the small dose of steroid which is typically given after the procedure may decrease her immune system, she still opted to obtain the steroid afterwards in order to minimize the possibility of  any residual paresthesia.  The patient also understands that in order for her to come in for the radiofrequency ablation, we are requiring for her to have COVID-19 tested 48 to 72 hours in advance.  She is agreeable with that as  well.  Fluoroscopic guidance will be utilized.    The risks and benefits as well as alternative options were explained to the patient in detail.    As it pertains to any injection there is an inherent risks of bleeding and infection.    And as with any pain procedure, there is a possibility that the procedure may not completely relieve the pain to the satisfaction of the patient.  The patient expresses understanding and wishes to proceed.     She does not yet have medical power of .  She suffers from significant headaches for which she goes to [facility].  Patient is normotensive and normal weight.  She had her DEXA bone density scan with Dr. Mendiola and is deemed normal and that was January 2019.  She has not been falling down the basis.  Her M ETS scores greater than 4.  She hardly ever touches cigarettes.  The patient was not exposed to any radiation.  The  patient's medications and pain levels reviewed today.  She has arthritis in the knees and the spine.    Thank you very much Dr. Nava.   I will keep you informed.        ______________________________  Amie Walker MD  [id number]      cc:

## 2024-05-13 NOTE — PATIENT INSTRUCTIONS
Take 2 colchicine 0.6 mg tablets (1.2 mg) then 1 one hour later take 1 additional tablet (0.6 mg). Can repeat in 2 weeks as needed

## 2024-05-14 ENCOUNTER — CLINICAL SUPPORT (OUTPATIENT)
Dept: UROLOGY | Facility: CLINIC | Age: 74
End: 2024-05-14
Payer: MEDICARE

## 2024-05-14 ENCOUNTER — INFUSION (OUTPATIENT)
Dept: INFECTIOUS DISEASES | Facility: HOSPITAL | Age: 74
End: 2024-05-14
Payer: MEDICARE

## 2024-05-14 VITALS
BODY MASS INDEX: 26.03 KG/M2 | HEIGHT: 74 IN | OXYGEN SATURATION: 95 % | DIASTOLIC BLOOD PRESSURE: 90 MMHG | WEIGHT: 202.81 LBS | TEMPERATURE: 97 F | SYSTOLIC BLOOD PRESSURE: 175 MMHG | RESPIRATION RATE: 18 BRPM | HEART RATE: 87 BPM

## 2024-05-14 DIAGNOSIS — E86.0 DEHYDRATION: ICD-10-CM

## 2024-05-14 DIAGNOSIS — R79.89 ELEVATED BRAIN NATRIURETIC PEPTIDE (BNP) LEVEL: Primary | ICD-10-CM

## 2024-05-14 DIAGNOSIS — N18.32 TYPE 2 DIABETES MELLITUS WITH STAGE 3B CHRONIC KIDNEY DISEASE, WITHOUT LONG-TERM CURRENT USE OF INSULIN: Primary | ICD-10-CM

## 2024-05-14 DIAGNOSIS — E11.22 TYPE 2 DIABETES MELLITUS WITH STAGE 3B CHRONIC KIDNEY DISEASE, WITHOUT LONG-TERM CURRENT USE OF INSULIN: Primary | ICD-10-CM

## 2024-05-14 DIAGNOSIS — C61 PROSTATE CANCER: Primary | ICD-10-CM

## 2024-05-14 DIAGNOSIS — Z93.3 COLOSTOMY PRESENT: ICD-10-CM

## 2024-05-14 DIAGNOSIS — R09.89 LABILE HYPERTENSION: ICD-10-CM

## 2024-05-14 PROCEDURE — 96402 CHEMO HORMON ANTINEOPL SQ/IM: CPT | Mod: HCNC,S$GLB,, | Performed by: UROLOGY

## 2024-05-14 PROCEDURE — 96360 HYDRATION IV INFUSION INIT: CPT | Mod: HCNC

## 2024-05-14 PROCEDURE — 25000003 PHARM REV CODE 250: Mod: HCNC | Performed by: STUDENT IN AN ORGANIZED HEALTH CARE EDUCATION/TRAINING PROGRAM

## 2024-05-14 RX ORDER — SODIUM CHLORIDE 0.9 % (FLUSH) 0.9 %
10 SYRINGE (ML) INJECTION
Status: CANCELLED | OUTPATIENT
Start: 2024-05-17

## 2024-05-14 RX ORDER — HEPARIN 100 UNIT/ML
500 SYRINGE INTRAVENOUS
Status: CANCELLED | OUTPATIENT
Start: 2024-05-17

## 2024-05-14 RX ADMIN — SODIUM CHLORIDE 1000 ML: 9 INJECTION, SOLUTION INTRAVENOUS at 08:05

## 2024-05-14 NOTE — PROGRESS NOTES
Patient tolerated injection well. He will follow up with dr. Mota in 6 months with a psa and Lupron injection

## 2024-05-21 ENCOUNTER — TELEPHONE (OUTPATIENT)
Dept: INFECTIOUS DISEASES | Facility: HOSPITAL | Age: 74
End: 2024-05-21

## 2024-05-21 NOTE — TELEPHONE ENCOUNTER
Left voice mail - patient missed appt with out unit on 5.17 and this morning 5.21 - advised patient that we could infuse fluids today if he wanted to come in, but I also understand that he was recently discharged from hospital (yesterday) for treatment of dehydration

## 2024-05-24 ENCOUNTER — INFUSION (OUTPATIENT)
Dept: INFECTIOUS DISEASES | Facility: HOSPITAL | Age: 74
End: 2024-05-24
Payer: MEDICARE

## 2024-05-24 ENCOUNTER — CLINICAL SUPPORT (OUTPATIENT)
Dept: CARDIOLOGY | Facility: HOSPITAL | Age: 74
End: 2024-05-24
Payer: MEDICARE

## 2024-05-24 ENCOUNTER — CLINICAL SUPPORT (OUTPATIENT)
Dept: CARDIOLOGY | Facility: HOSPITAL | Age: 74
End: 2024-05-24
Attending: INTERNAL MEDICINE
Payer: MEDICARE

## 2024-05-24 VITALS
WEIGHT: 202.63 LBS | HEIGHT: 74 IN | RESPIRATION RATE: 18 BRPM | OXYGEN SATURATION: 97 % | SYSTOLIC BLOOD PRESSURE: 172 MMHG | TEMPERATURE: 98 F | BODY MASS INDEX: 26 KG/M2 | HEART RATE: 81 BPM | DIASTOLIC BLOOD PRESSURE: 90 MMHG

## 2024-05-24 DIAGNOSIS — R55 SYNCOPE AND COLLAPSE: ICD-10-CM

## 2024-05-24 DIAGNOSIS — Z93.3 COLOSTOMY PRESENT: ICD-10-CM

## 2024-05-24 DIAGNOSIS — E11.22 TYPE 2 DIABETES MELLITUS WITH STAGE 3B CHRONIC KIDNEY DISEASE, WITHOUT LONG-TERM CURRENT USE OF INSULIN: Primary | ICD-10-CM

## 2024-05-24 DIAGNOSIS — E86.0 DEHYDRATION: ICD-10-CM

## 2024-05-24 DIAGNOSIS — N18.32 TYPE 2 DIABETES MELLITUS WITH STAGE 3B CHRONIC KIDNEY DISEASE, WITHOUT LONG-TERM CURRENT USE OF INSULIN: Primary | ICD-10-CM

## 2024-05-24 PROCEDURE — 93298 REM INTERROG DEV EVAL SCRMS: CPT | Mod: HCNC | Performed by: INTERNAL MEDICINE

## 2024-05-24 PROCEDURE — 25000003 PHARM REV CODE 250: Mod: HCNC | Performed by: STUDENT IN AN ORGANIZED HEALTH CARE EDUCATION/TRAINING PROGRAM

## 2024-05-24 PROCEDURE — 93298 REM INTERROG DEV EVAL SCRMS: CPT | Mod: 26,HCNC,, | Performed by: INTERNAL MEDICINE

## 2024-05-24 PROCEDURE — 96360 HYDRATION IV INFUSION INIT: CPT | Mod: HCNC

## 2024-05-24 RX ORDER — SODIUM CHLORIDE 0.9 % (FLUSH) 0.9 %
10 SYRINGE (ML) INJECTION
Status: CANCELLED | OUTPATIENT
Start: 2024-05-28

## 2024-05-24 RX ORDER — HEPARIN 100 UNIT/ML
500 SYRINGE INTRAVENOUS
Status: CANCELLED | OUTPATIENT
Start: 2024-05-28

## 2024-05-24 RX ADMIN — SODIUM CHLORIDE 1000 ML: 9 INJECTION, SOLUTION INTRAVENOUS at 08:05

## 2024-05-27 LAB
OHS CV AF BURDEN PERCENT: 2.1
OHS CV DC REMOTE DEVICE TYPE: NORMAL
OHS CV ICD SHOCK: NO

## 2024-05-28 ENCOUNTER — INFUSION (OUTPATIENT)
Dept: INFECTIOUS DISEASES | Facility: HOSPITAL | Age: 74
End: 2024-05-28
Payer: MEDICARE

## 2024-05-28 VITALS
TEMPERATURE: 98 F | WEIGHT: 201.38 LBS | DIASTOLIC BLOOD PRESSURE: 89 MMHG | BODY MASS INDEX: 25.84 KG/M2 | HEART RATE: 93 BPM | RESPIRATION RATE: 18 BRPM | OXYGEN SATURATION: 96 % | SYSTOLIC BLOOD PRESSURE: 143 MMHG | HEIGHT: 74 IN

## 2024-05-28 DIAGNOSIS — N18.32 TYPE 2 DIABETES MELLITUS WITH STAGE 3B CHRONIC KIDNEY DISEASE, WITHOUT LONG-TERM CURRENT USE OF INSULIN: Primary | ICD-10-CM

## 2024-05-28 DIAGNOSIS — Z93.3 COLOSTOMY PRESENT: ICD-10-CM

## 2024-05-28 DIAGNOSIS — E86.0 DEHYDRATION: ICD-10-CM

## 2024-05-28 DIAGNOSIS — E11.22 TYPE 2 DIABETES MELLITUS WITH STAGE 3B CHRONIC KIDNEY DISEASE, WITHOUT LONG-TERM CURRENT USE OF INSULIN: Primary | ICD-10-CM

## 2024-05-28 PROCEDURE — 96360 HYDRATION IV INFUSION INIT: CPT | Mod: HCNC

## 2024-05-28 PROCEDURE — 25000003 PHARM REV CODE 250: Mod: HCNC | Performed by: STUDENT IN AN ORGANIZED HEALTH CARE EDUCATION/TRAINING PROGRAM

## 2024-05-28 RX ORDER — SODIUM CHLORIDE 0.9 % (FLUSH) 0.9 %
10 SYRINGE (ML) INJECTION
Status: CANCELLED | OUTPATIENT
Start: 2024-05-31

## 2024-05-28 RX ORDER — HEPARIN 100 UNIT/ML
500 SYRINGE INTRAVENOUS
Status: CANCELLED | OUTPATIENT
Start: 2024-05-31

## 2024-05-28 RX ADMIN — SODIUM CHLORIDE 1000 ML: 9 INJECTION, SOLUTION INTRAVENOUS at 08:05

## 2024-05-29 ENCOUNTER — OFFICE VISIT (OUTPATIENT)
Dept: PRIMARY CARE CLINIC | Facility: CLINIC | Age: 74
End: 2024-05-29
Payer: MEDICARE

## 2024-05-29 ENCOUNTER — TELEPHONE (OUTPATIENT)
Dept: PODIATRY | Facility: CLINIC | Age: 74
End: 2024-05-29
Payer: MEDICARE

## 2024-05-29 VITALS
DIASTOLIC BLOOD PRESSURE: 84 MMHG | BODY MASS INDEX: 25.62 KG/M2 | WEIGHT: 199.63 LBS | HEART RATE: 97 BPM | RESPIRATION RATE: 18 BRPM | SYSTOLIC BLOOD PRESSURE: 152 MMHG | OXYGEN SATURATION: 97 % | HEIGHT: 74 IN

## 2024-05-29 DIAGNOSIS — N18.32 TYPE 2 DIABETES MELLITUS WITH STAGE 3B CHRONIC KIDNEY DISEASE, WITHOUT LONG-TERM CURRENT USE OF INSULIN: ICD-10-CM

## 2024-05-29 DIAGNOSIS — E11.22 TYPE 2 DIABETES MELLITUS WITH STAGE 3B CHRONIC KIDNEY DISEASE, WITHOUT LONG-TERM CURRENT USE OF INSULIN: ICD-10-CM

## 2024-05-29 DIAGNOSIS — Z93.2 HIGH OUTPUT ILEOSTOMY: ICD-10-CM

## 2024-05-29 DIAGNOSIS — D36.13 NEUROMA OF FOOT: ICD-10-CM

## 2024-05-29 DIAGNOSIS — G57.61 MORTON'S NEUROMA OF RIGHT FOOT: ICD-10-CM

## 2024-05-29 DIAGNOSIS — I10 ESSENTIAL HYPERTENSION: ICD-10-CM

## 2024-05-29 DIAGNOSIS — R19.8 HIGH OUTPUT ILEOSTOMY: ICD-10-CM

## 2024-05-29 DIAGNOSIS — R42 VERTIGO: Primary | ICD-10-CM

## 2024-05-29 PROCEDURE — 3079F DIAST BP 80-89 MM HG: CPT | Mod: HCNC,CPTII,S$GLB, | Performed by: INTERNAL MEDICINE

## 2024-05-29 PROCEDURE — 99999 PR PBB SHADOW E&M-EST. PATIENT-LVL V: CPT | Mod: PBBFAC,HCNC,, | Performed by: INTERNAL MEDICINE

## 2024-05-29 PROCEDURE — 99214 OFFICE O/P EST MOD 30 MIN: CPT | Mod: HCNC,S$GLB,, | Performed by: INTERNAL MEDICINE

## 2024-05-29 PROCEDURE — 1159F MED LIST DOCD IN RCRD: CPT | Mod: HCNC,CPTII,S$GLB, | Performed by: INTERNAL MEDICINE

## 2024-05-29 PROCEDURE — 3066F NEPHROPATHY DOC TX: CPT | Mod: HCNC,CPTII,S$GLB, | Performed by: INTERNAL MEDICINE

## 2024-05-29 PROCEDURE — 3077F SYST BP >= 140 MM HG: CPT | Mod: HCNC,CPTII,S$GLB, | Performed by: INTERNAL MEDICINE

## 2024-05-29 PROCEDURE — 3288F FALL RISK ASSESSMENT DOCD: CPT | Mod: HCNC,CPTII,S$GLB, | Performed by: INTERNAL MEDICINE

## 2024-05-29 PROCEDURE — 1111F DSCHRG MED/CURRENT MED MERGE: CPT | Mod: HCNC,CPTII,S$GLB, | Performed by: INTERNAL MEDICINE

## 2024-05-29 PROCEDURE — 1126F AMNT PAIN NOTED NONE PRSNT: CPT | Mod: HCNC,CPTII,S$GLB, | Performed by: INTERNAL MEDICINE

## 2024-05-29 PROCEDURE — 1101F PT FALLS ASSESS-DOCD LE1/YR: CPT | Mod: HCNC,CPTII,S$GLB, | Performed by: INTERNAL MEDICINE

## 2024-05-29 PROCEDURE — 3051F HG A1C>EQUAL 7.0%<8.0%: CPT | Mod: HCNC,CPTII,S$GLB, | Performed by: INTERNAL MEDICINE

## 2024-05-29 PROCEDURE — 1160F RVW MEDS BY RX/DR IN RCRD: CPT | Mod: HCNC,CPTII,S$GLB, | Performed by: INTERNAL MEDICINE

## 2024-05-29 RX ORDER — MECLIZINE HYDROCHLORIDE 25 MG/1
25 TABLET ORAL 3 TIMES DAILY PRN
Qty: 30 TABLET | Refills: 1 | Status: SHIPPED | OUTPATIENT
Start: 2024-05-29

## 2024-05-29 RX ORDER — HYDRALAZINE HYDROCHLORIDE 25 MG/1
25 TABLET, FILM COATED ORAL 3 TIMES DAILY PRN
Qty: 180 TABLET | Refills: 3 | Status: SHIPPED | OUTPATIENT
Start: 2024-05-29

## 2024-05-29 NOTE — TELEPHONE ENCOUNTER
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

## 2024-05-30 DIAGNOSIS — R42 VERTIGO: ICD-10-CM

## 2024-05-30 DIAGNOSIS — I95.1 SYNCOPE DUE TO ORTHOSTATIC HYPOTENSION: ICD-10-CM

## 2024-05-30 RX ORDER — HYDRALAZINE HYDROCHLORIDE 25 MG/1
25 TABLET, FILM COATED ORAL 3 TIMES DAILY PRN
Qty: 180 TABLET | Refills: 3 | Status: CANCELLED | OUTPATIENT
Start: 2024-05-30

## 2024-05-30 RX ORDER — MIDODRINE HYDROCHLORIDE 10 MG/1
10 TABLET ORAL 3 TIMES DAILY PRN
Qty: 270 TABLET | Refills: 1 | Status: SHIPPED | OUTPATIENT
Start: 2024-05-30 | End: 2025-05-30

## 2024-05-30 RX ORDER — MECLIZINE HYDROCHLORIDE 25 MG/1
25 TABLET ORAL 3 TIMES DAILY PRN
Qty: 30 TABLET | Refills: 1 | Status: CANCELLED | OUTPATIENT
Start: 2024-05-30

## 2024-05-30 NOTE — PROGRESS NOTES
Subjective:       Patient ID: Jarrett Charlton Jr. is a 74 y.o. male.    Chief Complaint: Follow-up (hospital)    Follow-up  Associated symptoms include arthralgias. Pertinent negatives include no abdominal pain or chest pain.     Pt with multiple medical conditions hypotension with sincope from high output colostomy  HTN HLP DM gouty arthritis pt visit today ER f/u with vertigo and mild MI? From ER and blockage in sinuses has ENT appt Friday c/o feel haevy in eyelids and dizzy that come and go no sob cp no WILSON no fever chill and rt hand swollen colchicne help   Review of Systems   Constitutional:  Negative for unexpected weight change.   Respiratory:  Negative for shortness of breath.    Cardiovascular:  Negative for chest pain and palpitations.   Gastrointestinal:  Negative for abdominal pain.   Musculoskeletal:  Positive for arthralgias. Negative for back pain.   Psychiatric/Behavioral:  Negative for dysphoric mood.        Objective:      Physical Exam  Vitals and nursing note reviewed.   Constitutional:       General: He is not in acute distress.     Appearance: He is well-developed.   HENT:      Head: Normocephalic and atraumatic.      Right Ear: External ear normal.      Left Ear: External ear normal.      Nose: Nose normal.   Eyes:      Conjunctiva/sclera: Conjunctivae normal.      Pupils: Pupils are equal, round, and reactive to light.   Neck:      Thyroid: No thyromegaly.   Cardiovascular:      Rate and Rhythm: Normal rate and regular rhythm.      Heart sounds: Normal heart sounds. No murmur heard.     No friction rub. No gallop.   Pulmonary:      Effort: Pulmonary effort is normal. No respiratory distress.      Breath sounds: Normal breath sounds. No wheezing.   Abdominal:      General: Bowel sounds are normal. There is no distension.      Palpations: Abdomen is soft.      Tenderness: There is no abdominal tenderness.   Musculoskeletal:         General: No tenderness or deformity. Normal range of motion.       Cervical back: Normal range of motion and neck supple.   Lymphadenopathy:      Cervical: No cervical adenopathy.   Skin:     General: Skin is warm and dry.      Findings: No erythema or rash.   Neurological:      Mental Status: He is alert and oriented to person, place, and time.   Psychiatric:         Mood and Affect: Mood normal.         Thought Content: Thought content normal.         Judgment: Judgment normal.         Assessment:       1. Vertigo    2. Neuroma of foot    3. Type 2 diabetes mellitus with stage 3b chronic kidney disease, without long-term current use of insulin    4. High output ileostomy    5. Essential hypertension    6. Cabarles's neuroma of right foot        Plan:       Vertigo  -     meclizine (ANTIVERT) 25 mg tablet; Take 1 tablet (25 mg total) by mouth 3 (three) times daily as needed for Dizziness.  Dispense: 30 tablet; Refill: 1    Neuroma of foot  -     Ambulatory referral/consult to Podiatry; Future; Expected date: 06/05/2024    Type 2 diabetes mellitus with stage 3b chronic kidney disease, without long-term current use of insulin  Comments:  fairly controlled continue with tx    High output ileostomy  Comments:  continue with OP IVF    Essential hypertension  Comments:  BP sl high but better fotr pt with recurrent syncope from hypotension    Cabrales's neuroma of right foot  -     Ambulatory referral/consult to Podiatry; Future; Expected date: 06/05/2024    Other orders  -     hydrALAZINE (APRESOLINE) 25 MG tablet; Take 1 tablet (25 mg total) by mouth 3 (three) times daily as needed.  Dispense: 180 tablet; Refill: 3        Medication List with Changes/Refills   New Medications    MECLIZINE (ANTIVERT) 25 MG TABLET    Take 1 tablet (25 mg total) by mouth 3 (three) times daily as needed for Dizziness.   Current Medications    ACCU-CHEK PAUL CONTROL SOLN SOLN    1 drop by NOT APPLICABLE route once daily.    ACCU-CHEK GUIDE TEST STRIPS STRP    TEST BLOOD SUGAR THREE TIMES DAILY     ACCU-CHEK SOFTCLIX LANCETS MISC    TEST BLOOD SUGAR THREE TIMES DAILY    ALLOPURINOL (ZYLOPRIM) 300 MG TABLET    Take 1.5 tablets (450 mg total) by mouth once daily.    ASPIRIN (ECOTRIN) 81 MG EC TABLET    Take 1 tablet (81 mg total) by mouth once daily.    BLOOD-GLUCOSE METER (ACCU-CHEK GUIDE GLUCOSE METER) [facility] [place]    Accucheck BID    BRIMONIDINE 0.2% (ALPHAGAN) 0.2 % DROP    Place 1 drop into both eyes 2 (two) times a day.    CALCIUM-VITAMIN D3 (OS-DAKOTA 500 + D3) 500 MG-5 MCG (200 UNIT) PER TABLET    Take 1 tablet by mouth once daily.    COLCHICINE (COLCRYS) 0.6 MG TABLET    Take 2 then 1 one hour later prn gout can repeta in 2 weeks prn    DEXAMETHASONE (DECADRON) 6 MG TABLET    Take 1 tablet (6 mg total) by mouth daily with breakfast. for 5 days    DROPSAFE ALCOHOL PREP PADS PADM    USE TOPICALLY 5 TIMES DAILY.    FERROUS SULFATE (IRON) 325 MG (65 MG IRON) TAB TABLET    Take 1 tablet (325 mg total) by mouth every other day.    FLUDROCORTISONE (FLORINEF) 0.1 MG TAB    Take 1 tablet (100 mcg total) by mouth 2 (two) times daily.    GABAPENTIN (NEURONTIN) 600 MG TABLET    Take 1 tablet (600 mg total) by mouth 3 (three) times daily.    GLIMEPIRIDE (AMARYL) 4 MG TABLET    Take 1 tablet (4 mg total) by mouth 2 (two) times daily before meals.    LEVOFLOXACIN (LEVAQUIN) 500 MG TABLET    Take 1 tablet (500 mg total) by mouth once daily.    LIDOCAINE (LIDODERM) 5 %    Place 1 patch onto the skin as needed. Remove & Discard patch within 12 hours or as directed by MD    MAGNESIUM OXIDE (MAG-OX) 400 MG (241.3 MG MAGNESIUM) TABLET    TAKE 1 TABLET EVERY DAY    MIDODRINE (PROAMATINE) 10 MG TABLET    Take 1 tablet (10 mg total) by mouth 3 (three) times daily as needed (Hypotension).    PANTOPRAZOLE (PROTONIX) 40 MG TABLET    Take 1 tablet (40 mg total) by mouth once daily.    PRAVASTATIN (PRAVACHOL) 40 MG TABLET    Take 1 tablet (40 mg total) by mouth once daily.    SODIUM BICARBONATE 650 MG TABLET    Take 2 tablets (1,300 mg total)  by mouth 2 (two) times daily.   Changed and/or Refilled Medications    Modified Medication Previous Medication    HYDRALAZINE (APRESOLINE) 25 MG TABLET hydrALAZINE (APRESOLINE) 25 MG tablet       Take 1 tablet (25 mg total) by mouth 3 (three) times daily as needed.    Take 25 mg by mouth 3 (three) times daily as needed.

## 2024-05-30 NOTE — TELEPHONE ENCOUNTER
No care due was identified.  [facility] Embedded Care Due Messages. Reference number: [id number].   5/30/2024 9:25:36 AM CDT

## 2024-05-30 NOTE — TELEPHONE ENCOUNTER
----- Message from Madie Mederos sent at 5/30/2024  9:40 AM CDT -----  Contact: [phone]@[email or web address]  Good morning patient would like a call back to discuss getting his refill. Patient says his med was sent to the wrong pharmacy but he doesn't know the name of his med and would like the nurse to call him to discuss it. Please call patient to advise  [phone]

## 2024-05-31 ENCOUNTER — OFFICE VISIT (OUTPATIENT)
Dept: OTOLARYNGOLOGY | Facility: CLINIC | Age: 74
End: 2024-05-31
Payer: MEDICARE

## 2024-05-31 ENCOUNTER — INFUSION (OUTPATIENT)
Dept: INFECTIOUS DISEASES | Facility: HOSPITAL | Age: 74
End: 2024-05-31
Payer: MEDICARE

## 2024-05-31 VITALS
OXYGEN SATURATION: 98 % | DIASTOLIC BLOOD PRESSURE: 93 MMHG | WEIGHT: 205.56 LBS | SYSTOLIC BLOOD PRESSURE: 144 MMHG | HEART RATE: 88 BPM | TEMPERATURE: 99 F | BODY MASS INDEX: 26.38 KG/M2 | HEIGHT: 74 IN | RESPIRATION RATE: 18 BRPM

## 2024-05-31 VITALS
DIASTOLIC BLOOD PRESSURE: 88 MMHG | WEIGHT: 206.44 LBS | BODY MASS INDEX: 26.51 KG/M2 | SYSTOLIC BLOOD PRESSURE: 168 MMHG | HEART RATE: 80 BPM

## 2024-05-31 DIAGNOSIS — Z93.3 COLOSTOMY PRESENT: ICD-10-CM

## 2024-05-31 DIAGNOSIS — J34.2 DEVIATED SEPTUM: ICD-10-CM

## 2024-05-31 DIAGNOSIS — E86.0 DEHYDRATION: ICD-10-CM

## 2024-05-31 DIAGNOSIS — J34.1 MAXILLARY SINUS CYST: ICD-10-CM

## 2024-05-31 DIAGNOSIS — R42 DIZZINESS AND GIDDINESS: Primary | ICD-10-CM

## 2024-05-31 DIAGNOSIS — E11.22 TYPE 2 DIABETES MELLITUS WITH STAGE 3B CHRONIC KIDNEY DISEASE, WITHOUT LONG-TERM CURRENT USE OF INSULIN: Primary | ICD-10-CM

## 2024-05-31 DIAGNOSIS — N18.32 TYPE 2 DIABETES MELLITUS WITH STAGE 3B CHRONIC KIDNEY DISEASE, WITHOUT LONG-TERM CURRENT USE OF INSULIN: Primary | ICD-10-CM

## 2024-05-31 DIAGNOSIS — Z91.81 RISK FOR FALLS: ICD-10-CM

## 2024-05-31 PROCEDURE — 1101F PT FALLS ASSESS-DOCD LE1/YR: CPT | Mod: HCNC,CPTII,S$GLB, | Performed by: OTOLARYNGOLOGY

## 2024-05-31 PROCEDURE — 1159F MED LIST DOCD IN RCRD: CPT | Mod: HCNC,CPTII,S$GLB, | Performed by: OTOLARYNGOLOGY

## 2024-05-31 PROCEDURE — 3288F FALL RISK ASSESSMENT DOCD: CPT | Mod: HCNC,CPTII,S$GLB, | Performed by: OTOLARYNGOLOGY

## 2024-05-31 PROCEDURE — 99999 PR PBB SHADOW E&M-EST. PATIENT-LVL V: CPT | Mod: PBBFAC,HCNC,, | Performed by: OTOLARYNGOLOGY

## 2024-05-31 PROCEDURE — 3079F DIAST BP 80-89 MM HG: CPT | Mod: HCNC,CPTII,S$GLB, | Performed by: OTOLARYNGOLOGY

## 2024-05-31 PROCEDURE — 1126F AMNT PAIN NOTED NONE PRSNT: CPT | Mod: HCNC,CPTII,S$GLB, | Performed by: OTOLARYNGOLOGY

## 2024-05-31 PROCEDURE — 99214 OFFICE O/P EST MOD 30 MIN: CPT | Mod: HCNC,S$GLB,, | Performed by: OTOLARYNGOLOGY

## 2024-05-31 PROCEDURE — 3077F SYST BP >= 140 MM HG: CPT | Mod: HCNC,CPTII,S$GLB, | Performed by: OTOLARYNGOLOGY

## 2024-05-31 PROCEDURE — 3051F HG A1C>EQUAL 7.0%<8.0%: CPT | Mod: HCNC,CPTII,S$GLB, | Performed by: OTOLARYNGOLOGY

## 2024-05-31 PROCEDURE — 25000003 PHARM REV CODE 250: Mod: HCNC | Performed by: STUDENT IN AN ORGANIZED HEALTH CARE EDUCATION/TRAINING PROGRAM

## 2024-05-31 PROCEDURE — 3066F NEPHROPATHY DOC TX: CPT | Mod: HCNC,CPTII,S$GLB, | Performed by: OTOLARYNGOLOGY

## 2024-05-31 PROCEDURE — 1111F DSCHRG MED/CURRENT MED MERGE: CPT | Mod: HCNC,CPTII,S$GLB, | Performed by: OTOLARYNGOLOGY

## 2024-05-31 PROCEDURE — 1160F RVW MEDS BY RX/DR IN RCRD: CPT | Mod: HCNC,CPTII,S$GLB, | Performed by: OTOLARYNGOLOGY

## 2024-05-31 PROCEDURE — 96360 HYDRATION IV INFUSION INIT: CPT | Mod: HCNC

## 2024-05-31 RX ORDER — SODIUM CHLORIDE 0.9 % (FLUSH) 0.9 %
10 SYRINGE (ML) INJECTION
Status: CANCELLED | OUTPATIENT
Start: 2024-06-04

## 2024-05-31 RX ORDER — HEPARIN 100 UNIT/ML
500 SYRINGE INTRAVENOUS
Status: CANCELLED | OUTPATIENT
Start: 2024-06-04

## 2024-05-31 RX ADMIN — SODIUM CHLORIDE 1000 ML: 9 INJECTION, SOLUTION INTRAVENOUS at 08:05

## 2024-05-31 NOTE — PATIENT INSTRUCTIONS
NASAL SALINE    Still saline comes in many preparations including sprays/mists, gels, and rinses.  Different preparations served different purposes.  Saline spray helps to briefly moisturize the nose and help clear mucus.  Saline gels coat the nose for longer protective benefit of keeping the linings the nose moist.  Saline rinses clear the nose and sinuses and a more thorough way in her best used for significant postnasal drip and sinus complaints.  A combination of saline sprays/mists, gels and rinses should be used to address routine nasal clearing and dryness issues as well as flushing for better control of allergy and postnasal drip symptoms.  There is no real risk of over use of nasal saline products.  Saline sprays do not have any of the potential rebound or addiction of nasal decongestant sprays.  Nasal saline sprays and rinses should be used prior to the application of any medicated nasal sprays such as nasal steroids or nasal antihistamine sprays.        INTRANASAL STEROID SPRAYS      Intranasal steroid sprays are available both by prescription and over-the-counter both in generic and brand name preparations.  They are all very similar in efficacy and side effect profiles.  Over-the-counter and prescription intranasal steroids include fluticasone propionate (Flonase), fluticasone furoate (Sensimist), triamcinolone (Nasacort), and budesonide (Rhinocort).  While these medications are available as prescriptions as well there are few nasal steroids in her available by prescription only and include mometasone (Nasonex), flunisolide (Nasarel), and beclomethasone (QNASL).    Nasal steroids or the foundation of treatment of both allergy and other inflammatory conditions of the nose and sinuses.  They are safe for regular use and while there are many side effects listed most of these are steroid class effects and not typically encountered.  Typical side effects include dryness and even ulceration and bleeding  of the nose.  These side effects can be minimized by proper application and proper moisturization with saline and saline gel.    Sometimes changing between 1 brand of nasal steroid and another can result in improved control of symptoms especially after long term use of one particular nasal steroid.    Proper application of the nasal steroid spray is accomplished by spraying towards the I/ear on the same side with the tip of the superior just barely inside the nostril with the chin slightly downward.  Any dripping should be gently inhaled not sniff test backwards into the throat.  Labeled instructions should be followed.        SINUS RINSE INSTRUCTIONS    Nasal Saline Irrigation Instructions  You can wash your nasal and sinus passages using nasal saline (salt water) irrigation. This   is simple and effective. Follow the instructions below, as well as the ones provided by your   physician.  Supplies  First, you will need a nasal saline irrigation bottle and rinse solution.   You can purchase nasal rinse kits that include these items (such as   NeilMed®, Ayr®, Simply Saline®, Ocean Complete®) at most drug   stores. You can also make your own saline irrigation solution by   adding kosher (non-iodine) salt and baking soda to distilled water.   Your physician may tell you to add medications like a steroid or   antibiotic to the rinse as needed.  Steps for nasal irrigation  Step 1. Fill the bottle  ? Wash your hands.  ? Fill the irrigation bottle with lukewarm distilled water or boiled water that has cooled.  Step 2. Mix the solution  ? Put the saline and salt packet contents into the bottle.  ? Tighten the top of the bottle and shake it gently to dissolve the mixture.  ? If you are making your own solution:   - Add 1/4 to 1/2 teaspoon of baking soda and 1/8 teaspoon of kosher (non-iodine) salt   into the bottle.   - Tighten the top of the bottle.   - Shake the bottle gently to dissolve the mixture.  Step 3. Get into  position  ?  front of the sink.  ? Unless you were instructed to use another position, bend forward.   Then tilt your face down about 45 degrees so that you are looking   down into the sink.  ? Gently place the spout of the saline bottle against 1 of your nostrils.  [facility]  CARE AND TREATMENT  Patient Education  ©2018 [employer]  ©2018 [employer]  Step 4. Rinse  ? Breathing through your mouth, gently squeeze the   bottle. This will squirt the solution into your nostril. The   solution will start to drain from the other nostril. Some   may drain from your mouth. This is normal.  ? Use 2 ounces (half of the bottle) on each nostril.  ? Afterwards, you may need to blow your nose gently to   help drain any solution that is left behind.  Step 5. Repeat  ? Repeat steps 3 and 4 with the other nostril.  You can watch a video to learn how to do nasal saline irrigation. Go to [email or web address] and   search for NeilMed Sinus Rinse.  Step 6.  Clean the bottle and cap. Air dry the Sinus Rinse bottle, cap, and tube on a clean paper towel, a lint free towel, or use NeilMed® NasaDOCK® or NasaDOCK plus (sold separately) to store the bottle, cap and tube.  Please read Warnings before using.  Our recommendation is to replace the bottle every three months.      NEILMED SHERITA INSTRUCTIONS    It is very important to keep these devices clean and free from any contamination. Replace the bottle every 3 months.  NasaDock Plus  NasaDock NeilMed® SINUS RINSE Squeeze Bottle: Please perform routine inspections of the bottle and tube for any discolorations and cracks. If there are any visual signs of deterioration or permanent color changes, please clean thoroughly. If the discolorations remain after cleansing, discard the items and purchase new ones. Please follow these instructions after each use of the product. Be sure to replace your product after three months.  Step 1:  Rinse the cap, tube and bottle using running water. Fill the bottle with distilled, micro-filtered (through 0.2 micron), reverse osmosis filtered, commercially bottled or previously boiled and cooled down water at lukewarm or body temperature..  Step 2: Add a few drops of dish washing liquid or baby shampoo.  Step 3: Attach the cap and tube to the bottle; hold your finger over the opening in the cap and shake the bottle vigorously.  Step 4: Squeeze the bottle hard to allow the soapy solution to clean the interior of the tube and the cap. Empty out the bottle completely.  Step 5: Rinse the soap from the bottle, cap and tube thoroughly and place the items on a clean paper towel to dry or use the preferred NasaDOCK® or NasaDOCK plus.    The NasaDOCK® is a simple, hygienic way to dry and store the SINUS RINSE bottle, cap and tube. NasaDOCK® comes with various hanging options and is available in different colors. Our newest model also offers storage for our SINUS RINSE mixture packets. We strongly suggest using NasaDOCK® as an inexpensive, easy way to dry the cap, tube and SINUS RINSE bottle.        Cleaning:  Do not use a  to clean the inside of a bottle. While our bottle is  safe, a  will not adequately clean the SINUS RINSE bottle. The water jets in dishwashers cannot enter the narrow neck of the bottle, and portions of the bottles interior will not be cleaned thoroughly. Additional methods of cleaning the bottle include the use of concentrated white vinegar or isopropyl alcohol (70% concentration), followed by scrubbing and rinsing as described above.       Microwave Disinfection  Clean the device with soap and water as mentioned above and shake off the excess water. Now place the bottle, cap and tube in the microwave for 40 seconds. This will disinfect the bottle, cap and tube. If the microwave has been used recently, please make sure that the inside of the microwave has  cooled back down to room temperature before using it to disinfect the bottle.    NeilMed NasaFlo® Neti Pot Users:  Use the same procedure as above.    Sinugator® Cleaning Directions:  Clean the Sinugator® by running plain water and dry with a clean lint free towel and then air dry the unit by keeping it open to the air. The nasal  tip, blue reservoir and white soft tube can be disinfected by cleaning with soap and water and shaking off the excess water before placing in the home microwave for 60 seconds. Clean the entire unit with a few drops of dishwashing liquid and water every three days to keep the unit clean. As a fi nal rinse to wash off any residual soap or tap water, use either distilled, micro-filtered (through 0.2 micron filter), commercially bottled or previously boiled & cooled down water. Please make sure to rinse thoroughly during each wash so no soap is left behind. DO NOT place the white motor unit in microwave for disinfection. Because of the units stainless steel components, this can cause damage or fire hazards.    General Principles of Maintenance & Storage:  When permissible use a microwave periodically to disinfect devices. Always store NeilMed® products in a cool and dry place with adequate ventilation. NasaDOCK® or NasaDOCK plus offer a simple hygienic way to air dry & neatly store the bottle, cap, tube and NasaFlo. Do not store the bottle with the cap screwed on, unless both are dry. Do not store the wet parts in a sealed plastic bag. If you travel before they are dry, wrap parts separately in paper towels. Hand soap or shampoo can be used for cleaning parts while away from home.        USE ONLY AS DIRECTED, IF SYMPTOMS PERSIST SEE YOUR DOCTOR/HEALTHCARE PROFESSIONAL. ALWAYS READ THE LABEL.

## 2024-05-31 NOTE — PROGRESS NOTES
[facility]    Subjective:      Patient: Jarrett Charlton Jr. Patient PCP: Poli Gonzalez MD         :  1950     Sex:  male      MRN:  [id number]          Date of Visit: 2024      Chief Complaint: Sinusitis (Patient was referred by ED.  Patient states he experiences being stopped up.  Patient states sometimes its one nostril at a time.  Patient states he has been experiencing signs of vertigo when he gets up in the morning //SNOT - /110/) and Dizziness      Patient ID: Jarrett Charlton Jr. is a 74 y.o. male     Patient is a  lifelong NON-smoker with a past medical history of high-output colostomy with the associated hypotension and syncope seen in the emergency room 2024 (notes reviewed) as well as a history of diabetes foot neuroma hypertension carotid artery disease chronic kidney disease hyperlipidemia ventricular tachycardia right bundle branch, status post LR be ablation 2024 CAD there dinner cervical disc disease referred to me by Dr. Michael Hua in consultation from the ER for possible vertigo as a cause of his dizziness.  Patient had MRI in the emergency room as part of his workup which revealed some fluid signal in the right mastoid as well as some mucoperiosteal thickening of the paranasal sinuses.    Describes a feeling of lightheadedness and dizziness on 1st rising in the morning.  He will me feel it a little less during the day.  Feels like his eyes or so heavy like there made of lead.  If he were stand up too quickly he knows he will fall.  It does not actually proceed with a sense of movement or see any spinning.  He has experienced vertigo in the past but not in many years.  No fluctuations of hearing aural fullness or sudden change in hearing or tinnitus.    Just recently prescribed Florinef but not yet started.  Feels he drinks plenty of water and does not feel that his issues are related to dehydration hemoglobin A1c of 7%    No audiogram.  No VNG.    CT head  05/23/2024 also reviewed shows perhaps some minimal spotty thickening of the left mastoid and what appears to be a left maxillary anterior-inferior retention cyst in 1 small spot of thickening in the right ethmoids and a marked nasal septal deviation.  MRI reviewed as well below.          05/23/2024 MRI of the brain without contrast      CLINICAL HISTORY:  Dizziness, persistent/recurrent, cardiac or vascular cause suspected;     TECHNIQUE:  Sagittal and axial T1, axial T2, axial FLAIR, axial gradient and axial diffusion imaging of the whole brain without contrast.     COMPARISON:  CT head earlier today 05/23/2024     FINDINGS:  There is no restricted diffusion to suggest acute or recent infarction.  Mild generalized cerebral volume loss with compensatory enlargement ventricles sulci and cisterns without hydrocephalus.  Major skull base T2 flow voids are present     Question partially empty sella.  No abnormal intra or extra-axial fluid collection.  There is lobular focus left maxillary antra suggestive for mucous retention cyst.  Partial fluid opacification left mastoid air cells     Trace mucosal thickening ethmoid air cells with small lobular opacity right posterior ethmoid air cells suggestive for mucous retention cyst     Impression:     Unremarkable noncontrast MRI brain as detailed above specifically without evidence for acute infarction or hydrocephalus.     Please note there is partial fluid opacification left mastoid air cells.     Please see above for additional details.        Electronically signed by:Usama Sena DO    Labs:  WBC   Date Value Ref Range Status   05/23/2024 6.89 3.90 - 12.70 K/uL Final     Hemoglobin   Date Value Ref Range Status   05/23/2024 11.6 (L) 14.0 - 18.0 g/dL Final     Platelets   Date Value Ref Range Status   05/23/2024 253 150 - 450 K/uL Final     Creatinine   Date Value Ref Range Status   05/23/2024 3.1 (H) 0.5 - 1.4 mg/dL Final     TSH   Date Value Ref Range Status    05/23/2024 0.782 0.400 - 4.000 uIU/mL Final     Hemoglobin A1C   Date Value Ref Range Status   05/09/2024 7.0 (H) 4.0 - 5.6 % Final     Comment:     ADA Screening Guidelines:  5.7-6.4%  Consistent with prediabetes  >or=6.5%  Consistent with diabetes    High levels of fetal hemoglobin interfere with the HbA1C  assay. Heterozygous hemoglobin variants (HbS, HgC, etc)do  not significantly interfere with this assay.   However, presence of multiple variants may affect accuracy.         Past Medical History  He has a past medical history of Basal cell carcinoma, BPH (benign prostatic hyperplasia), Carotid stenosis, Chronic kidney disease, Claudication, Coronary artery disease, DDD (degenerative disc disease), Diabetes mellitus with renal complications, Disc disease, degenerative, cervical, Encounter for blood transfusion, GERD (gastroesophageal reflux disease), Gout, chronic, History of ulcerative colitis, HLD (hyperlipidemia), HTN (hypertension), Ileostomy in place, RBBB, Squamous cell carcinoma, Squamous cell carcinoma, and Ventricular tachycardia.    Family / Surgical / Social History  His family history includes Cancer in his father; Dementia in his mother; Diabetes in his father.    Past Surgical History:   Procedure Laterality Date    ABLATION, SVT, ACCESSORY PATHWAY N/A 4/30/2024    Procedure: Ablation, SVT, Accessory Pathway;  Surgeon: Franky Taylor MD;  Location: [facility] EP LAB;  Service: Cardiology;  Laterality: N/A;  VT, RFA, Carto, MAC, GP, 3 Prep *MDT ILR in situ*    cardiac stents      CATARACT EXTRACTION Bilateral     CERVICAL FUSION      CHOLECYSTECTOMY N/A 2/14/2023    Procedure: CHOLECYSTECTOMY;  Surgeon: Mike Joyce MD;  Location: [facility] OR;  Service: General;  Laterality: N/A;  very high probabilty of converison to open    colectomy and ileostomy  1985    ENDOSCOPIC ULTRASOUND OF UPPER GASTROINTESTINAL TRACT N/A 2/10/2023    Procedure: ULTRASOUND, UPPER GI TRACT, ENDOSCOPIC;  Surgeon: Poli ORTIZ  MD Jonny;  Location: [facility] ENDO;  Service: Endoscopy;  Laterality: N/A;    ERCP N/A 2/10/2023    Procedure: ERCP (ENDOSCOPIC RETROGRADE CHOLANGIOPANCREATOGRAPHY);  Surgeon: Poli Fuller MD;  Location: [facility] ENDO;  Service: Endoscopy;  Laterality: N/A;    EXCISION OF PAROTID GLAND Left 12/18/2020    Procedure: EXCISION, PAROTID GLAND;  Surgeon: Michael Pinzon MD;  Location: [facility] OR 2ND FLR;  Service: ENT;  Laterality: Left;    INSERTION OF IMPLANTABLE LOOP RECORDER  06/07/2021    INSERTION OF IMPLANTABLE LOOP RECORDER Left 6/7/2021    Procedure: INSERTION, IMPLANTABLE LOOP RECORDER;  Surgeon: Romario Dao MD;  Location: [facility] CATH LAB;  Service: Cardiology;  Laterality: Left;    LEFT HEART CATHETERIZATION Right 4/15/2021    Procedure: CATHETERIZATION, HEART, LEFT;  Surgeon: Marcio Jones MD;  Location: [facility] CATH LAB;  Service: Cardiology;  Laterality: Right;    LYSIS OF ADHESIONS N/A 11/9/2020    Procedure: LYSIS, ADHESIONS,  ERAS low;  Surgeon: CED Haley MD;  Location: [facility] OR 2ND FLR;  Service: Colon and Rectal;  Laterality: N/A;    LYSIS OF ADHESIONS N/A 2/14/2023    Procedure: LYSIS, ADHESIONS;  Surgeon: Mike Joyce MD;  Location: [facility] OR;  Service: General;  Laterality: N/A;    POUCHOSCOPY N/A 4/6/2022    Procedure: ENDOSCOPY, POUCH, SMALL INTESTINE, DIAGNOSTIC;  Surgeon: Dieter Juarez MD;  Location: [facility] (2ND FLR);  Service: Endoscopy;  Laterality: N/A;    REPAIR, HERNIA, PARASTOMAL N/A 11/9/2020    Procedure: REPAIR, HERNIA, PARASTOMAL;  Surgeon: CED Haley MD;  Location: [facility] OR 2ND FLR;  Service: Colon and Rectal;  Laterality: N/A;    TRANSURETHRAL RESECTION OF PROSTATE (TURP) WITHOUT USE OF LASER N/A 1/23/2019    Procedure: TURP, WITHOUT USING LASER BIPOLAR;  Surgeon: Catarino Mota MD;  Location: [facility] OR 1ST FLR;  Service: Urology;  Laterality: N/A;  1.5 HOURS    TREATMENT OF CARDIAC ARRHYTHMIA  4/30/2024    Procedure: Cardioversion or Defibrillation;   "Surgeon: Franky Taylor MD;  Location: [facility] LAB;  Service: Cardiology;;       Social History     Tobacco Use    Smoking status: Never     Passive exposure: Never    Smokeless tobacco: Never   Substance and Sexual Activity    Alcohol use: No    Drug use: No    Sexual activity: Not Currently     Partners: Female       Medications  He has a current medication list which includes the following prescription(s): accu-chek joleen control soln, accu-chek guide test strips, accu-chek softclix lancets, allopurinol, aspirin, blood-glucose meter, brimonidine 0.2%, colchicine, dropsafe alcohol prep pads, ferrous sulfate, fludrocortisone, gabapentin, glimepiride, hydralazine, levofloxacin, lidocaine, magnesium oxide, meclizine, midodrine, pantoprazole, pravastatin, sodium bicarbonate, and calcium-vitamin d3, and the following Facility-Administered Medications: leuprolide acetate (6 month) and sodium chloride 0.9% in 1,000 mL infusion.      Allergies  Review of patient's allergies indicates:   Allergen Reactions    Crestor [rosuvastatin]     Lipitor [atorvastatin]        All medications, allergies, and past history have been reviewed.    Objective:      Vitals:      5/29/2024     2:14 PM 5/31/2024     7:55 AM 5/31/2024     2:59 PM   Vitals - 1 value per visit   SYSTOLIC 150 144 176   DIASTOLIC 90 93 96   Pulse 97 88 88   Temp  98.5 °F (36.9 °C)    Resp 18 18    SPO2 97 % 98 %    Weight (lb) 199.63 205.58 206.46   Weight (kg) 90.55 93.25 93.65   Height 6' 2" (1.88 m) 6' 2" (1.88 m)    BMI (Calculated) 25.6 26.4 26.5   Pain Score Zero  Zero       Body surface area is 2.21 meters squared.    Physical Exam:    GENERAL  APPEARANCE -  alert, appears stated age, and cooperative  BARRIER(S) TO COMMUNICATION -  none VOICE - appropriate for age and gender    INTEGUMENTARY  no suspicious head and neck lesions    HEENT  HEAD: Normocephalic, without obvious abnormality, atraumatic  FACE: INSPECTION - Symmetric, no signs of trauma, no " suspicious lesion(s)      STRENGTH - facial symmetry intact     PALPATION -  No masses     SALIVARY GLANDS - non-tender with no appreciable mass    NECK/THYROID: normal atraumatic, no neck masses, normal thyroid, no jvd    EYES  Normal occular alignment and mobility with no visible nystagmus at rest    EARS/NOSE/MOUTH/THROAT  EARS  PINNAE AND EXTERNAL EARS - no suspicious lesion OTOSCOPIC EXAM (surgical microscopy was not used for visualization/instrumentation): EAR EXAM - Normal ear canals, tympanic membranes and mobility, and middle ear spaces bilaterally.  HEARING - grossly intact to voice/finger rub    NOSE AND SINUSES  EXTERNAL NOSE - Grossly normal for age/sex  SEPTUM - S deviation of the septum TURBINATES - within normal limits MUCOSA - no congestion edema drainage or cyanosis.  Pale somewhat atrophic     MOUTH AND THROAT   ORAL CAVITY, LIPS, TEETH, GUMS & TONGUE - moist, no suspicious lesions  OROPHARYNX /TONSILS/PHARYNGEAL WALLS/HYPOPHARYNX - no erythema or exudates  NASOPHARYNX - limited mirror exam - unable to visualize due to anatomy/gag  LARYNX -  - limited mirror exam - unable to visualize due to anatomy/gag      CHEST AND LUNG   INSPECTION & AUSCULTATION - normal effort, no stridor    CARDIOVASCULAR  AUSCULTATION & PERIPHERAL VASCULAR - regular rate and rhythm.    NEUROLOGIC  MENTAL STATUS - alert, interactive CRANIAL NERVES - normal    ALISE-HALLPIKE - RIGHT - no nystagmus LEFT - no nystagmus  HEAD THRUSTS - Catch up saccades absent bilaterally limited by neck stiffness/mobility  GAIT - normal w/o drift and normal turn bilaterally    LYMPHATIC  HEAD AND NECK - non-palpable; SUPRACLAVICULAR - deferred      Procedure(s):  None          Assessment:      Problem List Items Addressed This Visit    None  Visit Diagnoses       Dizziness and giddiness    -  Primary    Acute non-recurrent ethmoidal sinusitis                     Plan:      Symptoms more consistent with vasovagal syncope or presyncope though at  this time his orthostatic measurements are normal rather than any inner ear disturbance/vestibulopathy.  No evidence of unilateral vestibular weakness or inner ear disease on examination and specifically no BPPV.  Patient can undergo VNG testing and audiogram if symptoms persist in spite of current plan medical therapy though I do not think this is very high yield.    General nasal care instructions provided.  Primarily recommend saline and rinses to avoid any potential side effects of medications.  Can use nasal steroids if there is significant congestion just to make sure he does not create excessive dryness and what appears to be an already somewhat atrophic nose which could result in bleeding especially on aspirin therapy.  No indication for further evaluation and treatment of what appears to be some benign thickening likely mucous retention cyst of the left maxillary sinus.    Patient to talk to Dr. Gonzalez about taking the mineralocorticoid prescribed in the ER.    Follow up as needed          Voice recognition software was used in the creation of this note/communication and any sound-alike errors which may have occurred from its use should be taken in context when interpreting.  If such errors prevent a clear understanding of the note/communication, please contact the office for clarification.

## 2024-06-03 PROBLEM — J18.9 PNEUMONIA DUE TO INFECTIOUS ORGANISM: Status: RESOLVED | Noted: 2024-03-03 | Resolved: 2024-06-03

## 2024-06-04 ENCOUNTER — INFUSION (OUTPATIENT)
Dept: INFECTIOUS DISEASES | Facility: HOSPITAL | Age: 74
End: 2024-06-04
Attending: INTERNAL MEDICINE
Payer: MEDICARE

## 2024-06-04 VITALS
SYSTOLIC BLOOD PRESSURE: 167 MMHG | TEMPERATURE: 98 F | HEIGHT: 74 IN | BODY MASS INDEX: 26.06 KG/M2 | DIASTOLIC BLOOD PRESSURE: 80 MMHG | HEART RATE: 84 BPM | RESPIRATION RATE: 19 BRPM | OXYGEN SATURATION: 97 % | WEIGHT: 203.06 LBS

## 2024-06-04 DIAGNOSIS — E11.22 TYPE 2 DIABETES MELLITUS WITH STAGE 3B CHRONIC KIDNEY DISEASE, WITHOUT LONG-TERM CURRENT USE OF INSULIN: Primary | ICD-10-CM

## 2024-06-04 DIAGNOSIS — E86.0 DEHYDRATION: ICD-10-CM

## 2024-06-04 DIAGNOSIS — N18.32 TYPE 2 DIABETES MELLITUS WITH STAGE 3B CHRONIC KIDNEY DISEASE, WITHOUT LONG-TERM CURRENT USE OF INSULIN: Primary | ICD-10-CM

## 2024-06-04 DIAGNOSIS — Z93.3 COLOSTOMY PRESENT: ICD-10-CM

## 2024-06-04 PROCEDURE — 25000003 PHARM REV CODE 250: Mod: HCNC | Performed by: STUDENT IN AN ORGANIZED HEALTH CARE EDUCATION/TRAINING PROGRAM

## 2024-06-04 PROCEDURE — 96360 HYDRATION IV INFUSION INIT: CPT | Mod: HCNC

## 2024-06-04 RX ORDER — SODIUM CHLORIDE 0.9 % (FLUSH) 0.9 %
10 SYRINGE (ML) INJECTION
Status: CANCELLED | OUTPATIENT
Start: 2024-06-07

## 2024-06-04 RX ORDER — HEPARIN 100 UNIT/ML
500 SYRINGE INTRAVENOUS
Status: CANCELLED | OUTPATIENT
Start: 2024-06-07

## 2024-06-04 RX ADMIN — SODIUM CHLORIDE 1000 ML: 9 INJECTION, SOLUTION INTRAVENOUS at 08:06

## 2024-06-04 NOTE — PROGRESS NOTES
Patient received 1L NS bolus over 1 hr as ordered. Patient tolerated well without difficulty.     Next appointment scheduled and future appointments made till August 2024. Patient made aware.     Limited head-to-toe assessment due to privacy issues and visit reason though the opportunity was given for patient to express any concerns.

## 2024-06-07 ENCOUNTER — INFUSION (OUTPATIENT)
Dept: INFECTIOUS DISEASES | Facility: HOSPITAL | Age: 74
End: 2024-06-07
Payer: MEDICARE

## 2024-06-07 VITALS
OXYGEN SATURATION: 98 % | SYSTOLIC BLOOD PRESSURE: 146 MMHG | HEIGHT: 74 IN | TEMPERATURE: 98 F | RESPIRATION RATE: 196 BRPM | HEART RATE: 87 BPM | DIASTOLIC BLOOD PRESSURE: 88 MMHG | WEIGHT: 205.81 LBS | BODY MASS INDEX: 26.41 KG/M2

## 2024-06-07 DIAGNOSIS — N18.32 TYPE 2 DIABETES MELLITUS WITH STAGE 3B CHRONIC KIDNEY DISEASE, WITHOUT LONG-TERM CURRENT USE OF INSULIN: Primary | ICD-10-CM

## 2024-06-07 DIAGNOSIS — E11.22 TYPE 2 DIABETES MELLITUS WITH STAGE 3B CHRONIC KIDNEY DISEASE, WITHOUT LONG-TERM CURRENT USE OF INSULIN: Primary | ICD-10-CM

## 2024-06-07 DIAGNOSIS — E86.0 DEHYDRATION: ICD-10-CM

## 2024-06-07 DIAGNOSIS — Z93.3 COLOSTOMY PRESENT: ICD-10-CM

## 2024-06-07 PROCEDURE — 96360 HYDRATION IV INFUSION INIT: CPT | Mod: HCNC

## 2024-06-07 PROCEDURE — 25000003 PHARM REV CODE 250: Mod: HCNC | Performed by: STUDENT IN AN ORGANIZED HEALTH CARE EDUCATION/TRAINING PROGRAM

## 2024-06-07 RX ORDER — HEPARIN 100 UNIT/ML
500 SYRINGE INTRAVENOUS
Status: CANCELLED | OUTPATIENT
Start: 2024-06-11

## 2024-06-07 RX ORDER — SODIUM CHLORIDE 0.9 % (FLUSH) 0.9 %
10 SYRINGE (ML) INJECTION
Status: CANCELLED | OUTPATIENT
Start: 2024-06-11

## 2024-06-07 RX ADMIN — SODIUM CHLORIDE 1000 ML: 9 INJECTION, SOLUTION INTRAVENOUS at 08:06

## 2024-06-11 ENCOUNTER — INFUSION (OUTPATIENT)
Dept: INFECTIOUS DISEASES | Facility: HOSPITAL | Age: 74
End: 2024-06-11
Payer: MEDICARE

## 2024-06-11 VITALS
RESPIRATION RATE: 16 BRPM | DIASTOLIC BLOOD PRESSURE: 80 MMHG | HEIGHT: 74 IN | BODY MASS INDEX: 25.8 KG/M2 | OXYGEN SATURATION: 97 % | SYSTOLIC BLOOD PRESSURE: 163 MMHG | WEIGHT: 201 LBS | TEMPERATURE: 98 F

## 2024-06-11 DIAGNOSIS — Z93.3 COLOSTOMY PRESENT: ICD-10-CM

## 2024-06-11 DIAGNOSIS — N18.32 TYPE 2 DIABETES MELLITUS WITH STAGE 3B CHRONIC KIDNEY DISEASE, WITHOUT LONG-TERM CURRENT USE OF INSULIN: Primary | ICD-10-CM

## 2024-06-11 DIAGNOSIS — E11.22 TYPE 2 DIABETES MELLITUS WITH STAGE 3B CHRONIC KIDNEY DISEASE, WITHOUT LONG-TERM CURRENT USE OF INSULIN: Primary | ICD-10-CM

## 2024-06-11 DIAGNOSIS — E86.0 DEHYDRATION: ICD-10-CM

## 2024-06-11 PROCEDURE — 96365 THER/PROPH/DIAG IV INF INIT: CPT | Mod: HCNC

## 2024-06-11 PROCEDURE — 25000003 PHARM REV CODE 250: Mod: HCNC | Performed by: STUDENT IN AN ORGANIZED HEALTH CARE EDUCATION/TRAINING PROGRAM

## 2024-06-11 RX ORDER — SODIUM CHLORIDE 0.9 % (FLUSH) 0.9 %
10 SYRINGE (ML) INJECTION
Status: DISCONTINUED | OUTPATIENT
Start: 2024-06-11 | End: 2024-06-11 | Stop reason: HOSPADM

## 2024-06-11 RX ORDER — SODIUM CHLORIDE 0.9 % (FLUSH) 0.9 %
10 SYRINGE (ML) INJECTION
Status: CANCELLED | OUTPATIENT
Start: 2024-06-14

## 2024-06-11 RX ORDER — HEPARIN 100 UNIT/ML
500 SYRINGE INTRAVENOUS
Status: CANCELLED | OUTPATIENT
Start: 2024-06-14

## 2024-06-11 RX ADMIN — SODIUM CHLORIDE 1000 ML: 9 INJECTION, SOLUTION INTRAVENOUS at 08:06

## 2024-06-11 NOTE — PLAN OF CARE
Patient counseled on monitoring and reporting hypovolemia to provider - verbalized understanding

## 2024-06-11 NOTE — PROGRESS NOTES
Patient to receive 1L NS bolus over 1 hr as ordered.     Next appointment already scheduled and future appointments made till August 2024. Patient made aware.     Limited head-to-toe assessment due to privacy issues and visit reason though the opportunity was given for patient to express any concerns.

## 2024-06-12 DIAGNOSIS — I95.1 SYNCOPE DUE TO ORTHOSTATIC HYPOTENSION: ICD-10-CM

## 2024-06-12 DIAGNOSIS — E11.22 TYPE 2 DIABETES MELLITUS WITH STAGE 3 CHRONIC KIDNEY DISEASE, WITHOUT LONG-TERM CURRENT USE OF INSULIN, UNSPECIFIED WHETHER STAGE 3A OR 3B CKD: ICD-10-CM

## 2024-06-12 DIAGNOSIS — N18.30 TYPE 2 DIABETES MELLITUS WITH STAGE 3 CHRONIC KIDNEY DISEASE, WITHOUT LONG-TERM CURRENT USE OF INSULIN, UNSPECIFIED WHETHER STAGE 3A OR 3B CKD: ICD-10-CM

## 2024-06-12 NOTE — TELEPHONE ENCOUNTER
No care due was identified.  [facility] Embedded Care Due Messages. Reference number: [id number].   6/12/2024 6:50:19 PM CDT

## 2024-06-13 RX ORDER — PREDNISONE 2.5 MG/1
2.5 TABLET ORAL
Qty: 90 TABLET | Refills: 3 | Status: SHIPPED | OUTPATIENT
Start: 2024-06-13

## 2024-06-13 RX ORDER — PANTOPRAZOLE SODIUM 40 MG/1
40 TABLET, DELAYED RELEASE ORAL DAILY
Qty: 90 TABLET | Refills: 0 | Status: SHIPPED | OUTPATIENT
Start: 2024-06-13

## 2024-06-13 RX ORDER — GLIMEPIRIDE 4 MG/1
4 TABLET ORAL
Qty: 180 TABLET | Refills: 0 | Status: SHIPPED | OUTPATIENT
Start: 2024-06-13

## 2024-06-13 RX ORDER — MIDODRINE HYDROCHLORIDE 10 MG/1
TABLET ORAL
Qty: 270 TABLET | Refills: 3 | OUTPATIENT
Start: 2024-06-13

## 2024-06-13 NOTE — TELEPHONE ENCOUNTER
Refill Routing Note   Medication(s) are not appropriate for processing by [facility] for the following reason(s):        ED/Hospital Visit since last OV with provider  Required labs abnormal    [facility] action(s):  Defer             Pharmacist review requested: Yes     Appointments  past 12m or future 3m with PCP    Date Provider   Last Visit   5/29/2024 Poli Gonzalez MD   Next Visit   8/13/2024 Poli Gonzalez MD   ED visits in past 90 days: 3        Note composed:7:42 AM 06/13/2024

## 2024-06-13 NOTE — TELEPHONE ENCOUNTER
Midodrine refill request received from [facility] pharmacy.  Dr. Gonzalez refilled on 05/30/2024 to local pharmacy.

## 2024-06-13 NOTE — TELEPHONE ENCOUNTER
Jarrett Charlton  is requesting a refill authorization.  Brief Assessment and Rationale for Refill:  Approve     Medication Therapy Plan:  FOVS August 2024;      Pharmacist review requested: Yes   Extended chart review required: Yes   Comments:     Note composed:12:21 PM 06/13/2024

## 2024-06-14 ENCOUNTER — TELEPHONE (OUTPATIENT)
Dept: INFECTIOUS DISEASES | Facility: HOSPITAL | Age: 74
End: 2024-06-14

## 2024-06-17 ENCOUNTER — CLINICAL SUPPORT (OUTPATIENT)
Dept: CARDIOLOGY | Facility: HOSPITAL | Age: 74
End: 2024-06-17
Payer: MEDICARE

## 2024-06-17 ENCOUNTER — INFUSION (OUTPATIENT)
Dept: INFECTIOUS DISEASES | Facility: HOSPITAL | Age: 74
End: 2024-06-17
Payer: MEDICARE

## 2024-06-17 VITALS
WEIGHT: 202.06 LBS | BODY MASS INDEX: 25.93 KG/M2 | HEART RATE: 94 BPM | DIASTOLIC BLOOD PRESSURE: 85 MMHG | RESPIRATION RATE: 18 BRPM | TEMPERATURE: 98 F | SYSTOLIC BLOOD PRESSURE: 143 MMHG | OXYGEN SATURATION: 98 % | HEIGHT: 74 IN

## 2024-06-17 DIAGNOSIS — E86.0 DEHYDRATION: ICD-10-CM

## 2024-06-17 DIAGNOSIS — Z93.3 COLOSTOMY PRESENT: ICD-10-CM

## 2024-06-17 DIAGNOSIS — R55 SYNCOPE AND COLLAPSE: ICD-10-CM

## 2024-06-17 DIAGNOSIS — N18.32 TYPE 2 DIABETES MELLITUS WITH STAGE 3B CHRONIC KIDNEY DISEASE, WITHOUT LONG-TERM CURRENT USE OF INSULIN: Primary | ICD-10-CM

## 2024-06-17 DIAGNOSIS — I47.10 SUPRAVENTRICULAR TACHYCARDIA, UNSPECIFIED: ICD-10-CM

## 2024-06-17 DIAGNOSIS — E11.22 TYPE 2 DIABETES MELLITUS WITH STAGE 3B CHRONIC KIDNEY DISEASE, WITHOUT LONG-TERM CURRENT USE OF INSULIN: Primary | ICD-10-CM

## 2024-06-17 PROCEDURE — 25000003 PHARM REV CODE 250: Mod: HCNC | Performed by: STUDENT IN AN ORGANIZED HEALTH CARE EDUCATION/TRAINING PROGRAM

## 2024-06-17 PROCEDURE — 96360 HYDRATION IV INFUSION INIT: CPT | Mod: HCNC

## 2024-06-17 RX ORDER — SODIUM CHLORIDE 0.9 % (FLUSH) 0.9 %
10 SYRINGE (ML) INJECTION
OUTPATIENT
Start: 2024-06-18

## 2024-06-17 RX ORDER — HEPARIN 100 UNIT/ML
500 SYRINGE INTRAVENOUS
OUTPATIENT
Start: 2024-06-18

## 2024-06-17 RX ADMIN — SODIUM CHLORIDE 1000 ML: 9 INJECTION, SOLUTION INTRAVENOUS at 08:06

## 2024-06-17 NOTE — PROGRESS NOTES
Patient received 1L NS bolus over 1 hr as ordered. Patient tolerated well without difficulty.     Next appointment scheduled and future appointments made till September 2024. Patient made aware.     Limited head-to-toe assessment due to privacy issues and visit reason though the opportunity was given for patient to express any concerns.

## 2024-06-21 ENCOUNTER — OFFICE VISIT (OUTPATIENT)
Dept: CARDIOLOGY | Facility: CLINIC | Age: 74
End: 2024-06-21
Payer: MEDICARE

## 2024-06-21 ENCOUNTER — INFUSION (OUTPATIENT)
Dept: INFECTIOUS DISEASES | Facility: HOSPITAL | Age: 74
End: 2024-06-21
Payer: MEDICARE

## 2024-06-21 VITALS
SYSTOLIC BLOOD PRESSURE: 140 MMHG | HEIGHT: 74 IN | HEART RATE: 92 BPM | OXYGEN SATURATION: 98 % | BODY MASS INDEX: 25.96 KG/M2 | WEIGHT: 202.25 LBS | DIASTOLIC BLOOD PRESSURE: 87 MMHG | TEMPERATURE: 98 F | RESPIRATION RATE: 16 BRPM

## 2024-06-21 VITALS
OXYGEN SATURATION: 99 % | HEIGHT: 74 IN | DIASTOLIC BLOOD PRESSURE: 90 MMHG | BODY MASS INDEX: 26.28 KG/M2 | HEART RATE: 84 BPM | SYSTOLIC BLOOD PRESSURE: 145 MMHG | WEIGHT: 204.81 LBS

## 2024-06-21 DIAGNOSIS — Z93.2 HIGH OUTPUT ILEOSTOMY: ICD-10-CM

## 2024-06-21 DIAGNOSIS — E86.0 DEHYDRATION: ICD-10-CM

## 2024-06-21 DIAGNOSIS — N18.4 CKD (CHRONIC KIDNEY DISEASE), STAGE IV: Primary | ICD-10-CM

## 2024-06-21 DIAGNOSIS — E78.2 MIXED HYPERLIPIDEMIA: ICD-10-CM

## 2024-06-21 DIAGNOSIS — I45.2 BIFASCICULAR BLOCK: ICD-10-CM

## 2024-06-21 DIAGNOSIS — Z93.3 COLOSTOMY PRESENT: ICD-10-CM

## 2024-06-21 DIAGNOSIS — E11.42 TYPE 2 DIABETES MELLITUS WITH DIABETIC POLYNEUROPATHY, WITHOUT LONG-TERM CURRENT USE OF INSULIN: ICD-10-CM

## 2024-06-21 DIAGNOSIS — N18.4 CKD (CHRONIC KIDNEY DISEASE), STAGE IV: ICD-10-CM

## 2024-06-21 DIAGNOSIS — I10 ESSENTIAL HYPERTENSION: ICD-10-CM

## 2024-06-21 DIAGNOSIS — N18.32 TYPE 2 DIABETES MELLITUS WITH STAGE 3B CHRONIC KIDNEY DISEASE, WITHOUT LONG-TERM CURRENT USE OF INSULIN: Primary | ICD-10-CM

## 2024-06-21 DIAGNOSIS — R42 DIZZINESS: Primary | ICD-10-CM

## 2024-06-21 DIAGNOSIS — R19.8 HIGH OUTPUT ILEOSTOMY: ICD-10-CM

## 2024-06-21 DIAGNOSIS — I25.10 CORONARY ARTERY DISEASE INVOLVING NATIVE CORONARY ARTERY OF NATIVE HEART WITHOUT ANGINA PECTORIS: ICD-10-CM

## 2024-06-21 DIAGNOSIS — E87.20 METABOLIC ACIDOSIS WITH NORMAL ANION GAP AND BICARBONATE LOSSES: ICD-10-CM

## 2024-06-21 DIAGNOSIS — E11.22 TYPE 2 DIABETES MELLITUS WITH STAGE 3B CHRONIC KIDNEY DISEASE, WITHOUT LONG-TERM CURRENT USE OF INSULIN: Primary | ICD-10-CM

## 2024-06-21 DIAGNOSIS — K56.51 INTESTINAL ADHESIONS WITH PARTIAL OBSTRUCTION: ICD-10-CM

## 2024-06-21 DIAGNOSIS — I12.0 HYPERTENSIVE CHRONIC KIDNEY DISEASE WITH STAGE 5 CHRONIC KIDNEY DISEASE OR END STAGE RENAL DISEASE: ICD-10-CM

## 2024-06-21 PROCEDURE — 99999 PR PBB SHADOW E&M-EST. PATIENT-LVL V: CPT | Mod: PBBFAC,HCNC,, | Performed by: INTERNAL MEDICINE

## 2024-06-21 PROCEDURE — 25000003 PHARM REV CODE 250: Mod: HCNC | Performed by: STUDENT IN AN ORGANIZED HEALTH CARE EDUCATION/TRAINING PROGRAM

## 2024-06-21 RX ORDER — HYDRALAZINE HYDROCHLORIDE 10 MG/1
20 TABLET, FILM COATED ORAL 3 TIMES DAILY PRN
Qty: 180 TABLET | Refills: 3 | Status: SHIPPED | OUTPATIENT
Start: 2024-06-21 | End: 2025-06-21

## 2024-06-21 RX ORDER — HEPARIN 100 UNIT/ML
500 SYRINGE INTRAVENOUS
OUTPATIENT
Start: 2024-06-25

## 2024-06-21 RX ORDER — SODIUM CHLORIDE 0.9 % (FLUSH) 0.9 %
10 SYRINGE (ML) INJECTION
OUTPATIENT
Start: 2024-06-25

## 2024-06-21 RX ADMIN — SODIUM CHLORIDE 1000 ML: 9 INJECTION, SOLUTION INTRAVENOUS at 08:06

## 2024-06-21 NOTE — PROGRESS NOTES
Subjective:   Chief Complaint: Tachycardia  Last Clinic Visit: 3/5/2024     History of Present Illness: Jarrett Charlton Jr. is a 74 y.o. gentleman with complicated past medical history including CAD s/p PCI, labile blood pressure and orthostatic hypotension along with hypertensive urgency, hyperlipidemia, diabetes, aortic aneurysm, who presents for cardiology evaluation and follow-up  Interval History:  Over the course of the past 3 months he has had several trips to the ED. notable for dizziness, also was found to have an atrial tachycardia on monitoring, and admitted here for AT ablation which he successfully had an denies any follow-up palpitations.  His current regimen includes fludrocortisone 100 mcg twice a day, he takes 2/20mg midodrine in the morning, when he takes 1 midodrine 10 mg he feels that his blood pressure does not come up fast enough in the morning.  Then he notes gradual rising blood pressure to the 170s and 180s in the evening and he takes hydralazine in the evening.  Approximately once a day but checking his blood pressure regularly to decide when he wants to take an evening hydralazine.  Does not adjust the dose of his fludrocortisone at all.  Continuing to have high hopes for from his ostomy and continuing to get twice weekly infusions of large volume of saline with Nephrology on Tuesdays and Fridays, this is a plan that nephrology put together approximately 1 year ago, and has been getting twice weekly large volume fluid infusions.  Does not have a GI doctor that he is aware of that he has seen recently interestingly, and getting large volume saline infusions for high output ostomy, unclear what therapy has been tried to reduce ostomy output?  In the past we worked up adrenal insufficiency which was negative.    3/5/2024   He reports a longstanding history of very labile blood pressures ranging from 60/40 up to 175/80.  Describes a diurnal variation, with lower blood pressures in the  "morning, gradually rising blood pressures over the course the day, and then blood pressure is again deployed in the evenings.  He reports that the majority of his symptoms are related to low blood pressure not high blood pressure, and reports increasing dyspnea on exertion and difficulty breathing with low blood pressure.  He has seen several different specialists including Cardiology, neurology, endocrinology, and continues to have difficulty issues with this.  Has tried Florinef, pyridostigmine possibly however unclear was recently prescribed by Neurology, and was also recently given midodrine which he reports has not helped with his low blood pressures but only resulted in higher subsequent elevated blood pressures in the evenings.  He feels frustrated.    From Dr. Marrufo's Last Note:  72-year-old male with a past medical history SBO, s/p colostomy, GERD, parotid mass s/p resection, BPH & prostate CA s/p TURP, CKD 4, pleural plaque, ("neurogenic") orthostatic hypotension, hypertension, hyperlipidemia, DM2, carotid stenosis not hemodynamically significant ultrasound 03/2023, ascending thoracic aorta 4.4 cm CT 2018 then 4.7 cm in 2023, lower extremity arterial duplex 09/2022 no evidence of hemodynamically significant stenosis nonvisualization of the peroneal arteries prior SAKSHI 2019 normal, CAD status post PCI to mid LAD 2017 followed by cardiac catheterization 2021 patent stent nonobstructive disease prior MPI 2018 inferolateral ischemia, sustained ventricular tachycardia documented on discharge summary 4/2021 on amiodarone previous loop recorder implantation 2021 presumed link transmission showed several episodes of ventricular tachycardia with longest episode 36 beats also documented either atrial fibrillation RVR with aberrant conduction or AV radha reentry tachycardia (this was documented by an outside provider note scan 03/2021), echo 09/2023 normal EF grade 1 diastolic dysfunction mild LVH normal RV prior echo " 12/2022 normal EF mild LVH diastolic function indeterminate normal RV PASP 32+ CVP which could not be determined ascending aorta mildly dilated      Dx:  Labile hypertension, orthostatic hypotension  Possible autonomic insufficiency  -has tried and failed pyridostigmine, and fludrocortisone  -has seen Neurology, as well as endocrinology, as well as Cardiology  CAD s/p PCI 2017, mLAD  CKD IV, Cr fluctuates between 2-3  Remote small-bowel obstruction s/p colostomy  -history of high-output ostomy  Cholestatic jaundice  Prostate cancer s/p TURP  ascending aortic aneurysm, 4.7  -has seen Cardiothoracic surgery  NSVT, followed with by Dr. Taylor with loop recorder  Prior amiodarone use   False-positive AF on loop recorder    Medications:  Outpatient Encounter Medications as of 6/21/2024   Medication Sig Dispense Refill    ACCU-CHEK PAUL CONTROL SOLN Soln 1 drop by NOT APPLICABLE route once daily.      ACCU-CHEK GUIDE TEST STRIPS Strp TEST BLOOD SUGAR THREE TIMES DAILY 300 strip 10    ACCU-CHEK SOFTCLIX LANCETS Misc TEST BLOOD SUGAR THREE TIMES DAILY 300 each 3    allopurinoL (ZYLOPRIM) 300 MG tablet Take 1.5 tablets (450 mg total) by mouth once daily. 135 tablet 3    aspirin (ECOTRIN) 81 MG EC tablet Take 1 tablet (81 mg total) by mouth once daily. 30 tablet 11    blood-glucose meter (ACCU-CHEK GUIDE GLUCOSE METER) [facility] [place] 1 each 0    brimonidine 0.2% (ALPHAGAN) 0.2 % Drop Place 1 drop into both eyes 2 (two) times a day. 10 mL 5    colchicine (COLCRYS) 0.6 mg tablet Take 2 then 1 one hour later prn gout can repeta in 2 weeks prn 30 tablet 1    DROPSAFE ALCOHOL PREP PADS PadM USE TOPICALLY 5 TIMES DAILY. 500 each 3    ferrous sulfate (IRON) 325 mg (65 mg iron) Tab tablet Take 1 tablet (325 mg total) by mouth every other day. 15 tablet 11    fludrocortisone (FLORINEF) 0.1 mg Tab Take 1 tablet (100 mcg total) by mouth 2 (two) times daily. 180 tablet 3    gabapentin (NEURONTIN) 600 MG tablet Take 1 tablet (600 mg  total) by mouth 3 (three) times daily. 270 tablet 1    glimepiride (AMARYL) 4 MG tablet TAKE 1 TABLET TWICE DAILY BEFORE MEALS 180 tablet 0    HYDROcodone-acetaminophen (NORCO) 5-325 mg per tablet Take 1 tablet by mouth every 6 (six) hours as needed for Pain. 8 tablet 0    levoFLOXacin (LEVAQUIN) 500 MG tablet Take 1 tablet (500 mg total) by mouth once daily. 9 tablet 0    LIDOcaine (LIDODERM) 5 % Place 1 patch onto the skin as needed. Remove & Discard patch within 12 hours or as directed by MD 30 patch 0    magnesium oxide (MAG-OX) 400 mg (241.3 mg magnesium) tablet TAKE 1 TABLET EVERY DAY 90 tablet 3    midodrine (PROAMATINE) 10 MG tablet Take 1 tablet (10 mg total) by mouth 3 (three) times daily as needed (Hypotension). 270 tablet 1    pantoprazole (PROTONIX) 40 MG tablet Take 1 tablet (40 mg total) by mouth once daily. 90 tablet 0    pravastatin (PRAVACHOL) 40 MG tablet Take 1 tablet (40 mg total) by mouth once daily. 90 tablet 3    predniSONE (DELTASONE) 2.5 MG tablet TAKE 1 TABLET ONE TIME DAILY 90 tablet 3    sodium bicarbonate 650 MG tablet Take 2 tablets (1,300 mg total) by mouth 2 (two) times daily. 360 tablet 3    [DISCONTINUED] hydrALAZINE (APRESOLINE) 25 MG tablet Take 1 tablet (25 mg total) by mouth 3 (three) times daily as needed. 180 tablet 3    calcium-vitamin D3 (OS-DAKOTA 500 + D3) 500 mg-5 mcg (200 unit) per tablet Take 1 tablet by mouth once daily. 30 tablet 1    [] dexAMETHasone (DECADRON) 6 MG tablet Take 1 tablet (6 mg total) by mouth daily with breakfast. for 5 days 5 tablet 0    hydrALAZINE (APRESOLINE) 10 MG tablet Take 2 tablets (20 mg total) by mouth 3 (three) times daily as needed (elevated BP). 180 tablet 3    [] loperamide (IMODIUM) 2 mg capsule Take 1 capsule (2 mg total) by mouth 4 (four) times daily as needed for Diarrhea. 12 capsule 0    meclizine (ANTIVERT) 25 mg tablet Take 1 tablet (25 mg total) by mouth 3 (three) times daily as needed for Dizziness. 30 tablet 1     "[DISCONTINUED] glimepiride (AMARYL) 4 MG tablet Take 1 tablet (4 mg total) by mouth 2 (two) times daily before meals. 180 tablet 1    [DISCONTINUED] hydrALAZINE (APRESOLINE) 25 MG tablet Take 25 mg by mouth 3 (three) times daily as needed.      [DISCONTINUED] midodrine (PROAMATINE) 10 MG tablet Take 1 tablet (10 mg total) by mouth 3 (three) times daily as needed (Hypotension). 270 tablet 1    [DISCONTINUED] pantoprazole (PROTONIX) 40 MG tablet Take 1 tablet (40 mg total) by mouth once daily. 90 tablet 1     Facility-Administered Encounter Medications as of 6/21/2024   Medication Dose Route Frequency Provider Last Rate Last Admin    leuprolide acetate (6 month) injection 45 mg  45 mg Intramuscular Q6 Months    45 mg at 05/14/24 0941    [COMPLETED] sodium chloride 0.9% bolus 1,000 mL 1,000 mL  1,000 mL Intravenous 1 time in Clinic/HOD Jean Claude Logan Jr., MD   Stopped at 06/21/24 0845    sodium chloride 0.9% in 1,000 mL infusion   Intravenous Continuous Order, Paper         Social History:  Jarrett reports that he has never smoked. He has never been exposed to tobacco smoke. He has never used smokeless tobacco. He reports that he does not drink alcohol and does not use drugs.    Objective:   BP (!) 145/90   Pulse 84   Ht 6' 2" (1.88 m)   Wt 92.9 kg (204 lb 12.9 oz)   SpO2 99%   BMI 26.30 kg/m²     Physical Exam   Constitutional: He does not appear ill. No distress.   HENT:   Head: Normocephalic and atraumatic.   Mouth/Throat: Mucous membranes are moist.   Cardiovascular: Normal rate, regular rhythm, normal heart sounds and normal pulses. Exam reveals no gallop and no friction rub.   No murmur heard.  Pulmonary/Chest: Effort normal and breath sounds normal. No stridor. No respiratory distress. He has no wheezes. He has no rhonchi. He has no rales. He exhibits no tenderness.   Abdominal: Normal appearance.   Musculoskeletal:      Right lower leg: No edema.      Left lower leg: No edema.   Neurological: He is " alert.   Skin: Skin is warm.      EKG:  My independent visualization of most recent EKG is normal sinus rhythm, left anterior fascicular block right bundle-branch block    TTE:  09/28/2023     Left Ventricle: The left ventricle is normal in size. Mildly increased wall thickness. There is mild concentric hypertrophy. Normal wall motion. There is normal systolic function. Ejection fraction by visual approximation is 60%. Grade I diastolic dysfunction.    Right Ventricle: Normal right ventricular cavity size. Wall thickness is normal. Right ventricle wall motion  is normal. Systolic function is normal.    12/16/2022   The left ventricle is normal in size with mild concentric hypertrophy and normal systolic function.  The estimated ejection fraction is 60%. Technically difficult study. EF is based on limited views with poor endocardial border visualization.  Indeterminate left ventricular diastolic function.  Normal right ventricular size with normal right ventricular systolic function.  PASP is 32 plus central venous pressure which could not be determined.  The ascending aorta is mildly dilated.     Lipids:  Recent Labs   Lab 05/20/24  0608   LDL Cholesterol 58.6 L   HDL 30 L      Renal:  Recent Labs   Lab 06/10/24  1627   Creatinine 2.6 H   Potassium 4.6   CO2 23   BUN 45 H     Liver:  Recent Labs   Lab 06/10/24  1627   AST 20   ALT 16     LHC  04/15/2021   The pre-procedure left ventricular end diastolic pressure was 12.  The ejection fraction was greater than 55% by visual estimate.  Patent stent in the mid LAD with 20% stenosis just distal to this. IFR was point 9 3  Circumflex with 20-30% mid stenosis IFR 0.99  Normal right coronary artery  Planned medical therapy continue amiodarone for ventricular tachycardia     11/10/2017   Mid LAD lesion 75% stenosed.  1. Severe single vessel native coronary artery disease with a 75% mid left anterior descending artery stenosis.  2 Successful PCI reducing the mid 75% LAD  stenosis to 0% following placement of a 2.75/20mm Promus MARTHA. Post dilated > 14 david. JERAMY 3 flow post intervention.  3 Normal left ventricular function.   a. Ejection Fraction of 65%.   b. LVEDP of 14.    c. No AS or MR.     Assessment:     1. Dizziness    2. Essential hypertension    3. Bifascicular block    4. Coronary artery disease involving native coronary artery of native heart without angina pectoris    5. Mixed hyperlipidemia    6. CKD (chronic kidney disease), stage IV    7. Hypertensive chronic kidney disease with stage 5 chronic kidney disease or end stage renal disease    8. Metabolic acidosis with normal anion gap and bicarbonate losses    9. Type 2 diabetes mellitus with diabetic polyneuropathy, without long-term current use of insulin    10. Intestinal adhesions with partial obstruction    11. Colostomy present    12. High output ileostomy      Plan:   1. Dizziness  Recurrent episodes of dizziness, suspect ongoing issues of labile blood pressure, orthostatic hypotension, high output ostomy, and on multiple medications for blood pressure improvement in lowering.  Very difficult challenging situation as we have stated multiple times previously.    With his high output ostomy, he denies any recent GI referral, and is currently being managed by twice weekly large volume saline infusions.  I wonder if there could be any additional medical therapy which may benefit his output ostomy as opposed to requiring twice weekly large volume fluid infusions as I suspect this is not helping with his labile blood pressures given large volume fluctuations.    Continue fludrocortisone 100 twice daily as he is doing, continue midodrine encouraged him to consider 15 mg in the morning.  Continue to check blood pressure no more than 2-3 times daily, hydralazine p.r.n. as he is doing, appears to be managing reasonably well however ongoing challenges indeed.    2. Essential hypertension  As above    3. Bifascicular  block      4. Coronary artery disease involving native coronary artery of native heart without angina pectoris      5. Mixed hyperlipidemia      6. CKD (chronic kidney disease), stage IV      7. Hypertensive chronic kidney disease with stage 5 chronic kidney disease or end stage renal disease      8. Metabolic acidosis with normal anion gap and bicarbonate losses      9. Type 2 diabetes mellitus with diabetic polyneuropathy, without long-term current use of insulin      10. Intestinal adhesions with partial obstruction      11. Colostomy present      12. High output ileostomy    - Ambulatory referral/consult to Gastroenterology; Future        Follow-up in 3-6 months    Russell Gamboa MD [facility]

## 2024-06-21 NOTE — Clinical Note
Would you mind reaching out to this gentleman, I forgot to discuss, but wanted to offer him a GI referral for a 2nd opinion on any medical therapy for his high output ostomy?  Thanks so much!!  M

## 2024-06-21 NOTE — PROGRESS NOTES
Received NS over 1 hr. Tolerated well     Future appointments reschelduled per pt's request.    Limited head-to-toe assessment due to privacy issues and visit reason though the opportunity was given for patient to express any concerns

## 2024-06-24 ENCOUNTER — CLINICAL SUPPORT (OUTPATIENT)
Dept: CARDIOLOGY | Facility: HOSPITAL | Age: 74
End: 2024-06-24
Attending: INTERNAL MEDICINE
Payer: MEDICARE

## 2024-06-24 DIAGNOSIS — I47.10 SUPRAVENTRICULAR TACHYCARDIA, UNSPECIFIED: ICD-10-CM

## 2024-06-24 DIAGNOSIS — R55 SYNCOPE AND COLLAPSE: ICD-10-CM

## 2024-06-24 PROCEDURE — 93298 REM INTERROG DEV EVAL SCRMS: CPT | Mod: HCNC | Performed by: INTERNAL MEDICINE

## 2024-06-26 ENCOUNTER — TELEPHONE (OUTPATIENT)
Dept: NEPHROLOGY | Facility: CLINIC | Age: 74
End: 2024-06-26
Payer: MEDICARE

## 2024-06-26 LAB
OHS CV AF BURDEN PERCENT: < 1
OHS CV DC REMOTE DEVICE TYPE: NORMAL

## 2024-06-26 NOTE — TELEPHONE ENCOUNTER
Spoke to pt and informed him of message below per Dr. Logan     ----- Message from Jean Claude Logan Jr., MD sent at 6/25/2024  7:47 PM CDT -----  Definitely ok for the kidneys. Not great for overall health, but its important to live your life as well. The steroids are fine for kidneys.  ----- Message -----  From: Ivette Marroquin LPN  Sent: 6/21/2024   3:07 PM CDT  To: Jean Claude Logan Jr., MD    Pt would like to know if it's ok that he continue taking a low dose of steroids when he is having arthritis flare ups in hands, he states because that's what the doctors has been giving him but he would like to make sure that it's ok

## 2024-06-27 NOTE — PROGRESS NOTES
Problem: Patient Centered Care  Goal: Patient preferences are identified and integrated in the patient's plan of care  Description: Interventions:  - What would you like us to know as we care for you? I am from [place], I like to cook  - Provide timely, complete, and accurate information to patient/family  - Incorporate patient and family knowledge, values, beliefs, and cultural backgrounds into the planning and delivery of care  - Encourage patient/family to participate in care and decision-making at the level they choose  - Honor patient and family perspectives and choices  Outcome: Progressing     Problem: Diabetes/Glucose Control  Goal: Glucose maintained within prescribed range  Description: INTERVENTIONS:  - Monitor Blood Glucose as ordered  - Assess for signs and symptoms of hyperglycemia and hypoglycemia  - Administer ordered medications to maintain glucose within target range  - Assess barriers to adequate nutritional intake and initiate nutrition consult as needed  - Instruct patient on self management of diabetes  Outcome: Progressing     Problem: Patient/Family Goals  Goal: Patient/Family Long Term Goal  Description: Patient's Long Term Goal: get diabetic med clarification    Interventions:  - endocrine consult   - clear discharge instructions  -family involvement   - See additional Care Plan goals for specific interventions  Outcome: Progressing  Goal: Patient/Family Short Term Goal  Description: Patient's Short Term Goal: get stronger    Interventions:   - pt/ot  -up to chair and bathroom   -increase oral intake  - See additional Care Plan goals for specific interventions  Outcome: Progressing     Problem: PAIN - ADULT  Goal: Verbalizes/displays adequate comfort level or patient's stated pain goal  Description: INTERVENTIONS:  - Encourage pt to monitor pain and request assistance  - Assess pain using appropriate pain scale  - Administer analgesics based on type and severity of pain and evaluate  Subjective:     HPI    I had the pleasure of seeing Jarrett Charlton  in follow-up for his history of VT. Last seen in 2/2024. He is a 74M with SBO, s/p colostomy, GERD, parotid mass s/p resection, BPH & prostate CA s/p TURP, CKD 4, pleural plaque, orthostatic hypotension, hypertension, hyperlipidemia, DM2, CAD status post PCI to mid LAD in 2017 (LHC in 2021 showed patent stent, otherwise nonobstructive CAD), who reportedly had VT seen on Holter monitor in 4/2021 and was started on amiodarone around this time. An ILR was implanted in 5/2021, presumably for VT management purposes. Pt reportedly had several episodes of NSVT captured since device implant (longest 36 beats), as well as episodes of AF and SVT.     Echo in 12/2022 showed EF 60%.    At 3/2023 visit pt was on amiodarone 200 mg daily and toprol xl 25 mg daily, in addition to midodrine. Not on DOAC. Device check that day showed stored episodes of AF were in fact most consistent with artifact. Amiodarone stopped at this point.    9/22/2023: [facility] ILR alert received for nsVT > 10 beats.  Tachy episode lasting 15 secs, ecg c/w nsVT on 9/03/2023 at 1050 am.      11/7/2023: Atrial fibrillation noted during device remote check: 52 minutes on 10/26/23.  PVCs noted.  No further AF noted past 10/26/23. Pt asymptomatic.      1/31/2024: Report indicated several episodes of probable nsAT/SVT, maximum duration 18 mins 32 secs on 1/28/24; abrupt onset/offsets noted. Was seen in the hospital on 12/7/2023 for LOC at which time metoprolol was discontinued. Attempt to restart it was unsuccessful (hypotension).     At 3/2024 visit pt stated he has episodes of extreme SOB, LH, fatigue. BPs have been very labile. He is on midodrine. Just took one before this visit today (BP is now very high). Says if he doesn't take it he could become so hypotensive he will faint. Has had multiple syncopal episodes. No palps, CP.     On 4/30/2024 pt underwent EPS. An AT was induced, mapped to  6 o'clock on the tricuspid valve annulus, and successfully ablated.    Pt has had several ED visits/hospitalizations since his ablation, for dehydration, acute neck pain, and hand swelling. ECGs have shown sinus with frequent PACs. Device check in 6/2024 showed an episode of nonsustained AT -340 ms, lasting 6 seconds. Otherwise no events.    Pt states that the dizzy spells have subsided since ablation.    My interpretation of today's ECG is NSR at 69 bpm with PACs and RBBB.    Review of Systems   Constitutional: Negative for decreased appetite, malaise/fatigue, weight gain and weight loss.   HENT:  Negative for sore throat.    Eyes:  Negative for blurred vision.   Cardiovascular:  Negative for chest pain, dyspnea on exertion, irregular heartbeat, leg swelling, near-syncope, orthopnea, palpitations, paroxysmal nocturnal dyspnea and syncope.   Respiratory:  Negative for shortness of breath.    Skin:  Negative for rash.   Musculoskeletal:  Negative for arthritis.   Gastrointestinal:  Negative for abdominal pain.   Neurological:  Negative for focal weakness.   Psychiatric/Behavioral:  Negative for altered mental status.         Objective:    Physical Exam  Vitals and nursing note reviewed.   Constitutional:       General: He is not in acute distress.     Appearance: He is well-developed.   HENT:      Head: Normocephalic and atraumatic.   Eyes:      General: No scleral icterus.     Pupils: Pupils are equal, round, and reactive to light.   Neck:      Thyroid: No thyromegaly.   Cardiovascular:      Rate and Rhythm: Regular rhythm.      Pulses: Normal pulses.      Heart sounds: Normal heart sounds. No murmur heard.     No friction rub. No gallop.   Pulmonary:      Effort: Pulmonary effort is normal.      Breath sounds: Normal breath sounds.   Abdominal:      General: Bowel sounds are normal. There is no distension.      Palpations: Abdomen is soft.      Tenderness: There is no abdominal tenderness.   Musculoskeletal:  response  - Implement non-pharmacological measures as appropriate and evaluate response  - Consider cultural and social influences on pain and pain management  - Manage/alleviate anxiety  - Utilize distraction and/or relaxation techniques  - Monitor for opioid side effects  - Notify MD/LIP if interventions unsuccessful or patient reports new pain  - Anticipate increased pain with activity and pre-medicate as appropriate  Outcome: Progressing     Problem: RISK FOR INFECTION - ADULT  Goal: Absence of fever/infection during anticipated neutropenic period  Description: INTERVENTIONS  - Monitor WBC  - Administer growth factors as ordered  - Implement neutropenic guidelines  Outcome: Progressing     Problem: SAFETY ADULT - FALL  Goal: Free from fall injury  Description: INTERVENTIONS:  - Assess pt frequently for physical needs  - Identify cognitive and physical deficits and behaviors that affect risk of falls.  - [place] fall precautions as indicated by assessment.  - Educate pt/family on patient safety including physical limitations  - Instruct pt to call for assistance with activity based on assessment  - Modify environment to reduce risk of injury  - Provide assistive devices as appropriate  - Consider OT/PT consult to assist with strengthening/mobility  - Encourage toileting schedule  Outcome: Progressing     Problem: DISCHARGE PLANNING  Goal: Discharge to home or other facility with appropriate resources  Description: INTERVENTIONS:  - Identify barriers to discharge w/pt and caregiver  - Include patient/family/discharge partner in discharge planning  - Arrange for needed discharge resources and transportation as appropriate  - Identify discharge learning needs (meds, wound care, etc)  - Arrange for interpreters to assist at discharge as needed  - Consider post-discharge preferences of patient/family/discharge partner  - Complete POLST form as appropriate  - Assess patient's ability to be responsible for managing       Cervical back: Neck supple.   Skin:     General: Skin is warm and dry.      Findings: No rash.   Neurological:      Mental Status: He is alert and oriented to person, place, and time.   Psychiatric:         Behavior: Behavior normal.           Assessment:       1. SVT (supraventricular tachycardia)    2. Hypertensive urgency    3. Mixed hyperlipidemia    4. Essential hypertension    5. VT (ventricular tachycardia)    6. Paroxysmal ventricular tachycardia    7. Prolonged QT interval    8. Bifascicular block         Plan:       In summary, Jarrett Charlton Jr. is a 74M with a reported history of VT, AF, and AT. Intolerant to beta blockers. Now 3 months status-post AT ablation, with only a single nonsustained AT event noted since procedure.    Plan is to hold the course, follow-up 1 year.    Thank you for allowing me to participate in the care of this patient. Please do not hesitate to call me with any questions or concerns.                   their own health  - Refer to Case Management Department for coordinating discharge planning if the patient needs post-hospital services based on physician/LIP order or complex needs related to functional status, cognitive ability or social support system  Outcome: Progressing   Pt denies any pain. Up in bedside chair most of the day. Room air. CXR showing atelectasis. Encouraged IS. Pt and family updated about plan of care.

## 2024-06-28 ENCOUNTER — INFUSION (OUTPATIENT)
Dept: INFECTIOUS DISEASES | Facility: HOSPITAL | Age: 74
End: 2024-06-28
Payer: MEDICARE

## 2024-06-28 ENCOUNTER — TELEPHONE (OUTPATIENT)
Dept: ELECTROPHYSIOLOGY | Facility: CLINIC | Age: 74
End: 2024-06-28
Payer: MEDICARE

## 2024-06-28 VITALS
OXYGEN SATURATION: 99 % | HEART RATE: 90 BPM | TEMPERATURE: 98 F | WEIGHT: 201.06 LBS | RESPIRATION RATE: 16 BRPM | BODY MASS INDEX: 25.8 KG/M2 | SYSTOLIC BLOOD PRESSURE: 173 MMHG | DIASTOLIC BLOOD PRESSURE: 94 MMHG | HEIGHT: 74 IN

## 2024-06-28 DIAGNOSIS — Z93.3 COLOSTOMY PRESENT: ICD-10-CM

## 2024-06-28 DIAGNOSIS — E86.0 DEHYDRATION: ICD-10-CM

## 2024-06-28 DIAGNOSIS — E11.22 TYPE 2 DIABETES MELLITUS WITH STAGE 3B CHRONIC KIDNEY DISEASE, WITHOUT LONG-TERM CURRENT USE OF INSULIN: Primary | ICD-10-CM

## 2024-06-28 DIAGNOSIS — N18.32 TYPE 2 DIABETES MELLITUS WITH STAGE 3B CHRONIC KIDNEY DISEASE, WITHOUT LONG-TERM CURRENT USE OF INSULIN: Primary | ICD-10-CM

## 2024-06-28 PROCEDURE — 96360 HYDRATION IV INFUSION INIT: CPT | Mod: HCNC

## 2024-06-28 PROCEDURE — 25000003 PHARM REV CODE 250: Mod: HCNC | Performed by: STUDENT IN AN ORGANIZED HEALTH CARE EDUCATION/TRAINING PROGRAM

## 2024-06-28 RX ORDER — HEPARIN 100 UNIT/ML
500 SYRINGE INTRAVENOUS
OUTPATIENT
Start: 2024-07-02

## 2024-06-28 RX ORDER — SODIUM CHLORIDE 0.9 % (FLUSH) 0.9 %
10 SYRINGE (ML) INJECTION
OUTPATIENT
Start: 2024-07-02

## 2024-06-28 RX ADMIN — SODIUM CHLORIDE 1000 ML: 9 INJECTION, SOLUTION INTRAVENOUS at 08:06

## 2024-06-28 NOTE — PROGRESS NOTES
Patient received 1L NS bolus over 1 hr as ordered. Patient tolerated well without difficulty.   . Patient aware of next appt.     Limited head-to-toe assessment due to privacy issues and visit reason though the opportunity was given for patient to express any concerns.                                                                                                                                                                          Patient to receive 1L NS bolus over 1 hr as ordered.     Next appointment already scheduled and future appointments made till August 2024. Patient made aware.     Limited head-to-toe assessment due to privacy issues and visit reason though the opportunity was given for patient to express any concerns.

## 2024-07-01 ENCOUNTER — OFFICE VISIT (OUTPATIENT)
Dept: ELECTROPHYSIOLOGY | Facility: CLINIC | Age: 74
End: 2024-07-01
Payer: MEDICARE

## 2024-07-01 ENCOUNTER — HOSPITAL ENCOUNTER (OUTPATIENT)
Dept: CARDIOLOGY | Facility: CLINIC | Age: 74
Discharge: HOME | End: 2024-07-01
Payer: MEDICARE

## 2024-07-01 ENCOUNTER — INFUSION (OUTPATIENT)
Dept: INFECTIOUS DISEASES | Facility: HOSPITAL | Age: 74
End: 2024-07-01
Payer: MEDICARE

## 2024-07-01 VITALS
TEMPERATURE: 98 F | HEART RATE: 80 BPM | HEIGHT: 74 IN | RESPIRATION RATE: 20 BRPM | BODY MASS INDEX: 26.32 KG/M2 | DIASTOLIC BLOOD PRESSURE: 82 MMHG | SYSTOLIC BLOOD PRESSURE: 162 MMHG

## 2024-07-01 VITALS
DIASTOLIC BLOOD PRESSURE: 78 MMHG | HEIGHT: 74 IN | SYSTOLIC BLOOD PRESSURE: 130 MMHG | WEIGHT: 205 LBS | HEART RATE: 87 BPM | BODY MASS INDEX: 26.31 KG/M2

## 2024-07-01 DIAGNOSIS — E86.0 DEHYDRATION: ICD-10-CM

## 2024-07-01 DIAGNOSIS — E11.22 TYPE 2 DIABETES MELLITUS WITH STAGE 3B CHRONIC KIDNEY DISEASE, WITHOUT LONG-TERM CURRENT USE OF INSULIN: Primary | ICD-10-CM

## 2024-07-01 DIAGNOSIS — I45.2 BIFASCICULAR BLOCK: ICD-10-CM

## 2024-07-01 DIAGNOSIS — E78.2 MIXED HYPERLIPIDEMIA: ICD-10-CM

## 2024-07-01 DIAGNOSIS — I16.0 HYPERTENSIVE URGENCY: ICD-10-CM

## 2024-07-01 DIAGNOSIS — I47.29 PAROXYSMAL VENTRICULAR TACHYCARDIA: ICD-10-CM

## 2024-07-01 DIAGNOSIS — I49.8 OTHER CARDIAC ARRHYTHMIA: ICD-10-CM

## 2024-07-01 DIAGNOSIS — R94.31 PROLONGED QT INTERVAL: ICD-10-CM

## 2024-07-01 DIAGNOSIS — I10 ESSENTIAL HYPERTENSION: ICD-10-CM

## 2024-07-01 DIAGNOSIS — I47.20 VT (VENTRICULAR TACHYCARDIA): ICD-10-CM

## 2024-07-01 DIAGNOSIS — N18.32 TYPE 2 DIABETES MELLITUS WITH STAGE 3B CHRONIC KIDNEY DISEASE, WITHOUT LONG-TERM CURRENT USE OF INSULIN: Primary | ICD-10-CM

## 2024-07-01 DIAGNOSIS — I47.10 SVT (SUPRAVENTRICULAR TACHYCARDIA): Primary | ICD-10-CM

## 2024-07-01 DIAGNOSIS — Z93.3 COLOSTOMY PRESENT: ICD-10-CM

## 2024-07-01 LAB
OHS QRS DURATION: 128 MS
OHS QTC CALCULATION: 534 MS

## 2024-07-01 PROCEDURE — 1101F PT FALLS ASSESS-DOCD LE1/YR: CPT | Mod: HCNC,CPTII,S$GLB, | Performed by: INTERNAL MEDICINE

## 2024-07-01 PROCEDURE — 3060F POS MICROALBUMINURIA REV: CPT | Mod: HCNC,CPTII,S$GLB, | Performed by: INTERNAL MEDICINE

## 2024-07-01 PROCEDURE — 3051F HG A1C>EQUAL 7.0%<8.0%: CPT | Mod: HCNC,CPTII,S$GLB, | Performed by: INTERNAL MEDICINE

## 2024-07-01 PROCEDURE — 93005 ELECTROCARDIOGRAM TRACING: CPT | Mod: HCNC,S$GLB,, | Performed by: INTERNAL MEDICINE

## 2024-07-01 PROCEDURE — 96360 HYDRATION IV INFUSION INIT: CPT | Mod: HCNC

## 2024-07-01 PROCEDURE — 25000003 PHARM REV CODE 250: Mod: HCNC | Performed by: STUDENT IN AN ORGANIZED HEALTH CARE EDUCATION/TRAINING PROGRAM

## 2024-07-01 PROCEDURE — 3066F NEPHROPATHY DOC TX: CPT | Mod: HCNC,CPTII,S$GLB, | Performed by: INTERNAL MEDICINE

## 2024-07-01 PROCEDURE — 93010 ELECTROCARDIOGRAM REPORT: CPT | Mod: HCNC,S$GLB,, | Performed by: INTERNAL MEDICINE

## 2024-07-01 PROCEDURE — 3288F FALL RISK ASSESSMENT DOCD: CPT | Mod: HCNC,CPTII,S$GLB, | Performed by: INTERNAL MEDICINE

## 2024-07-01 PROCEDURE — 1126F AMNT PAIN NOTED NONE PRSNT: CPT | Mod: HCNC,CPTII,S$GLB, | Performed by: INTERNAL MEDICINE

## 2024-07-01 PROCEDURE — 3075F SYST BP GE 130 - 139MM HG: CPT | Mod: HCNC,CPTII,S$GLB, | Performed by: INTERNAL MEDICINE

## 2024-07-01 PROCEDURE — 3078F DIAST BP <80 MM HG: CPT | Mod: HCNC,CPTII,S$GLB, | Performed by: INTERNAL MEDICINE

## 2024-07-01 PROCEDURE — 99214 OFFICE O/P EST MOD 30 MIN: CPT | Mod: HCNC,S$GLB,, | Performed by: INTERNAL MEDICINE

## 2024-07-01 PROCEDURE — 1159F MED LIST DOCD IN RCRD: CPT | Mod: HCNC,CPTII,S$GLB, | Performed by: INTERNAL MEDICINE

## 2024-07-01 PROCEDURE — 99999 PR PBB SHADOW E&M-EST. PATIENT-LVL IV: CPT | Mod: PBBFAC,HCNC,, | Performed by: INTERNAL MEDICINE

## 2024-07-01 RX ORDER — HEPARIN 100 UNIT/ML
500 SYRINGE INTRAVENOUS
Status: CANCELLED | OUTPATIENT
Start: 2024-07-05

## 2024-07-01 RX ORDER — SODIUM CHLORIDE 0.9 % (FLUSH) 0.9 %
10 SYRINGE (ML) INJECTION
Status: CANCELLED | OUTPATIENT
Start: 2024-07-05

## 2024-07-01 RX ADMIN — SODIUM CHLORIDE 1000 ML: 9 INJECTION, SOLUTION INTRAVENOUS at 10:07

## 2024-07-03 ENCOUNTER — OFFICE VISIT (OUTPATIENT)
Dept: PRIMARY CARE CLINIC | Facility: CLINIC | Age: 74
End: 2024-07-03
Payer: MEDICARE

## 2024-07-03 VITALS
SYSTOLIC BLOOD PRESSURE: 136 MMHG | RESPIRATION RATE: 18 BRPM | HEIGHT: 74 IN | WEIGHT: 201.63 LBS | HEART RATE: 81 BPM | BODY MASS INDEX: 25.88 KG/M2 | OXYGEN SATURATION: 97 % | DIASTOLIC BLOOD PRESSURE: 90 MMHG

## 2024-07-03 DIAGNOSIS — Z87.39 HISTORY OF GOUT: ICD-10-CM

## 2024-07-03 DIAGNOSIS — M10.9 GOUTY ARTHRITIS OF RIGHT HAND: ICD-10-CM

## 2024-07-03 DIAGNOSIS — I10 ESSENTIAL HYPERTENSION: ICD-10-CM

## 2024-07-03 DIAGNOSIS — Z93.2 HIGH OUTPUT ILEOSTOMY: ICD-10-CM

## 2024-07-03 DIAGNOSIS — E11.22 TYPE 2 DIABETES MELLITUS WITH STAGE 3B CHRONIC KIDNEY DISEASE, WITHOUT LONG-TERM CURRENT USE OF INSULIN: Primary | ICD-10-CM

## 2024-07-03 DIAGNOSIS — M10.9 GOUTY ARTHRITIS OF RIGHT ANKLE: ICD-10-CM

## 2024-07-03 DIAGNOSIS — N18.32 TYPE 2 DIABETES MELLITUS WITH STAGE 3B CHRONIC KIDNEY DISEASE, WITHOUT LONG-TERM CURRENT USE OF INSULIN: Primary | ICD-10-CM

## 2024-07-03 DIAGNOSIS — N18.4 CKD (CHRONIC KIDNEY DISEASE), STAGE IV: ICD-10-CM

## 2024-07-03 DIAGNOSIS — R19.8 HIGH OUTPUT ILEOSTOMY: ICD-10-CM

## 2024-07-03 PROCEDURE — 99214 OFFICE O/P EST MOD 30 MIN: CPT | Mod: HCNC,S$GLB,, | Performed by: INTERNAL MEDICINE

## 2024-07-03 PROCEDURE — 3051F HG A1C>EQUAL 7.0%<8.0%: CPT | Mod: HCNC,CPTII,S$GLB, | Performed by: INTERNAL MEDICINE

## 2024-07-03 PROCEDURE — 3288F FALL RISK ASSESSMENT DOCD: CPT | Mod: HCNC,CPTII,S$GLB, | Performed by: INTERNAL MEDICINE

## 2024-07-03 PROCEDURE — 1125F AMNT PAIN NOTED PAIN PRSNT: CPT | Mod: HCNC,CPTII,S$GLB, | Performed by: INTERNAL MEDICINE

## 2024-07-03 PROCEDURE — 3075F SYST BP GE 130 - 139MM HG: CPT | Mod: HCNC,CPTII,S$GLB, | Performed by: INTERNAL MEDICINE

## 2024-07-03 PROCEDURE — 3066F NEPHROPATHY DOC TX: CPT | Mod: HCNC,CPTII,S$GLB, | Performed by: INTERNAL MEDICINE

## 2024-07-03 PROCEDURE — 3080F DIAST BP >= 90 MM HG: CPT | Mod: HCNC,CPTII,S$GLB, | Performed by: INTERNAL MEDICINE

## 2024-07-03 PROCEDURE — 3060F POS MICROALBUMINURIA REV: CPT | Mod: HCNC,CPTII,S$GLB, | Performed by: INTERNAL MEDICINE

## 2024-07-03 PROCEDURE — 1101F PT FALLS ASSESS-DOCD LE1/YR: CPT | Mod: HCNC,CPTII,S$GLB, | Performed by: INTERNAL MEDICINE

## 2024-07-03 PROCEDURE — 99999 PR PBB SHADOW E&M-EST. PATIENT-LVL V: CPT | Mod: PBBFAC,HCNC,, | Performed by: INTERNAL MEDICINE

## 2024-07-03 RX ORDER — COLCHICINE 0.6 MG/1
TABLET ORAL
Qty: 30 TABLET | Refills: 0 | Status: SHIPPED | OUTPATIENT
Start: 2024-07-03 | End: 2024-07-03 | Stop reason: SDUPTHER

## 2024-07-03 RX ORDER — OXYCODONE HYDROCHLORIDE 5 MG/1
5 TABLET ORAL EVERY 6 HOURS PRN
Qty: 30 TABLET | Refills: 0 | Status: SHIPPED | OUTPATIENT
Start: 2024-07-03 | End: 2024-07-03 | Stop reason: SDUPTHER

## 2024-07-03 NOTE — PROGRESS NOTES
Subjective:       Patient ID: Jarrett Charlton Jr. is a 74 y.o. male.    Chief Complaint: Follow-up (ER)    Follow-up  Associated symptoms include abdominal pain and arthralgias. Pertinent negatives include no chest pain.   Pt with multiple med conditions rcurrent hypotension for high utput osteomy HTN prostate cancer s/p resection goutty arthritis DM CRI anxiety depression pt is getting IVF weekly to prevent hypotension fron high output colostomy pt currently stable request pain med for arthritis flare up very painful he ha sPUD CRI can't atke any NSAIDS no sob cp no WILSON  gouty arthritis usually in rt ankle and rt hand sollen and erythema  Review of Systems   Constitutional:  Negative for unexpected weight change.   Respiratory:  Negative for shortness of breath.    Cardiovascular:  Negative for chest pain.   Gastrointestinal:  Positive for abdominal distention and abdominal pain.   Musculoskeletal:  Positive for arthralgias. Negative for back pain.   Psychiatric/Behavioral:  Positive for dysphoric mood.        Objective:      Physical Exam  Vitals and nursing note reviewed.   Constitutional:       General: He is not in acute distress.     Appearance: He is well-developed.   HENT:      Head: Normocephalic and atraumatic.      Right Ear: External ear normal.      Left Ear: External ear normal.      Nose: Nose normal.   Eyes:      Extraocular Movements: Extraocular movements intact.      Conjunctiva/sclera: Conjunctivae normal.      Pupils: Pupils are equal, round, and reactive to light.   Neck:      Thyroid: No thyromegaly.   Cardiovascular:      Rate and Rhythm: Normal rate and regular rhythm.      Heart sounds: Normal heart sounds. No murmur heard.     No friction rub. No gallop.   Pulmonary:      Effort: Pulmonary effort is normal. No respiratory distress.      Breath sounds: Normal breath sounds. No wheezing or rales.   Abdominal:      General: Bowel sounds are normal. There is no distension.      Palpations:  Abdomen is soft.      Tenderness: There is no abdominal tenderness.      Hernia: No hernia is present.   Musculoskeletal:         General: No tenderness or deformity. Normal range of motion.      Cervical back: Normal range of motion and neck supple.   Lymphadenopathy:      Cervical: No cervical adenopathy.   Skin:     General: Skin is warm and dry.      Capillary Refill: Capillary refill takes less than 2 seconds.      Findings: No erythema or rash.   Neurological:      Mental Status: He is alert and oriented to person, place, and time.   Psychiatric:         Thought Content: Thought content normal.         Judgment: Judgment normal.         Assessment:       1. Type 2 diabetes mellitus with stage 3b chronic kidney disease, without long-term current use of insulin    2. Essential hypertension    3. Gouty arthritis of right ankle    4. Gouty arthritis of right hand    5. High output ileostomy    6. History of gout    7. CKD (chronic kidney disease), stage IV        Plan:       Type 2 diabetes mellitus with stage 3b chronic kidney disease, without long-term current use of insulin  -     Diabetes Digital Medicine (DDMP) Enrollment Order    Essential hypertension  -     Hypertension Digital Medicine (HDMP) Enrollment Order    Gouty arthritis of right ankle  Comments:  avoid all NSAIDS    Gouty arthritis of right hand  -     Discontinue: oxyCODONE (ROXICODONE) 5 MG immediate release tablet; Take 1 tablet (5 mg total) by mouth every 6 (six) hours as needed for Pain.  Dispense: 30 tablet; Refill: 0  -     Discontinue: colchicine (COLCRYS) 0.6 mg tablet; 1 po qdaily prn gouty arthritis  Dispense: 30 tablet; Refill: 0    High output ileostomy  Comments:  continue with hydration IVF    History of gout  Comments:  Will price colchicine to be used p.r.n. for flare-up with renal doses  Orders:  -     Discontinue: colchicine (COLCRYS) 0.6 mg tablet; 1 po qdaily prn gouty arthritis  Dispense: 30 tablet; Refill: 0    CKD (chronic  kidney disease), stage IV  Comments:  sl improving with well hydrated avoid all NSAIDS        Medication List with Changes/Refills   New Medications    OXYCODONE (ROXICODONE) 5 MG IMMEDIATE RELEASE TABLET    Take 1 tablet (5 mg total) by mouth every 6 (six) hours as needed for Pain.   Current Medications    ACCU-CHEK PAUL CONTROL SOLN SOLN    1 drop by NOT APPLICABLE route once daily.    ACCU-CHEK GUIDE TEST STRIPS STRP    TEST BLOOD SUGAR THREE TIMES DAILY    ACCU-CHEK SOFTCLIX LANCETS MISC    TEST BLOOD SUGAR THREE TIMES DAILY    ALLOPURINOL (ZYLOPRIM) 300 MG TABLET    Take 1.5 tablets (450 mg total) by mouth once daily.    ASPIRIN (ECOTRIN) 81 MG EC TABLET    Take 1 tablet (81 mg total) by mouth once daily.    BLOOD-GLUCOSE METER (ACCU-CHEK GUIDE GLUCOSE METER) [facility]    Accucheck BID    BRIMONIDINE 0.2% (ALPHAGAN) 0.2 % DROP    Place 1 drop into both eyes 2 (two) times a day.    CALCIUM-VITAMIN D3 (OS-DAKOTA 500 + D3) 500 MG-5 MCG (200 UNIT) PER TABLET    Take 1 tablet by mouth once daily.    DROPSAFE ALCOHOL PREP PADS PADM    USE TOPICALLY 5 TIMES DAILY.    FERROUS SULFATE (IRON) 325 MG (65 MG IRON) TAB TABLET    Take 1 tablet (325 mg total) by mouth every other day.    FLUDROCORTISONE (FLORINEF) 0.1 MG TAB    Take 1 tablet (100 mcg total) by mouth 2 (two) times daily.    GABAPENTIN (NEURONTIN) 300 MG CAPSULE    Take 1 capsule (300 mg total) by mouth 3 (three) times daily. for 5 days    GLIMEPIRIDE (AMARYL) 4 MG TABLET    TAKE 1 TABLET TWICE DAILY BEFORE MEALS    HYDRALAZINE (APRESOLINE) 10 MG TABLET    Take 2 tablets (20 mg total) by mouth 3 (three) times daily as needed (elevated BP).    LIDOCAINE (LIDODERM) 5 %    Place 1 patch onto the skin as needed. Remove & Discard patch within 12 hours or as directed by MD    MAGNESIUM OXIDE (MAG-OX) 400 MG (241.3 MG MAGNESIUM) TABLET    TAKE 1 TABLET EVERY DAY    MECLIZINE (ANTIVERT) 25 MG TABLET    Take 1 tablet (25 mg total) by mouth 3 (three) times daily as needed for  Dizziness.    MIDODRINE (PROAMATINE) 10 MG TABLET    Take 1 tablet (10 mg total) by mouth 3 (three) times daily as needed (Hypotension).    PANTOPRAZOLE (PROTONIX) 40 MG TABLET    Take 1 tablet (40 mg total) by mouth once daily.    PRAVASTATIN (PRAVACHOL) 40 MG TABLET    Take 1 tablet (40 mg total) by mouth once daily.    SODIUM BICARBONATE 650 MG TABLET    Take 2 tablets (1,300 mg total) by mouth 2 (two) times daily.   Changed and/or Refilled Medications    Modified Medication Previous Medication    COLCHICINE (COLCRYS) 0.6 MG TABLET colchicine (COLCRYS) 0.6 mg tablet       1 po qdaily prn gouty arthritis    Take 2 then 1 one hour later prn gout can repeta in 2 weeks prn   Discontinued Medications    HYDROCODONE-ACETAMINOPHEN (NORCO) 5-325 MG PER TABLET    Take 1 tablet by mouth every 6 (six) hours as needed for Pain.    LEVOFLOXACIN (LEVAQUIN) 500 MG TABLET    Take 1 tablet (500 mg total) by mouth once daily.

## 2024-07-03 NOTE — TELEPHONE ENCOUNTER
No care due was identified.  [facility] Embedded Care Due Messages. Reference number: [id number].   7/03/2024 12:56:28 PM CDT

## 2024-07-04 RX ORDER — COLCHICINE 0.6 MG/1
TABLET ORAL
Qty: 30 TABLET | Refills: 0 | Status: ON HOLD | OUTPATIENT
Start: 2024-07-04

## 2024-07-04 RX ORDER — OXYCODONE HYDROCHLORIDE 5 MG/1
5 TABLET ORAL EVERY 6 HOURS PRN
Qty: 30 TABLET | Refills: 0 | Status: ON HOLD | OUTPATIENT
Start: 2024-07-04

## 2024-07-05 ENCOUNTER — HOSPITAL ENCOUNTER (OUTPATIENT)
Dept: CARDIOLOGY | Facility: CLINIC | Age: 74
Discharge: HOME | End: 2024-07-05
Payer: MEDICARE

## 2024-07-05 ENCOUNTER — DOCUMENTATION ONLY (OUTPATIENT)
Dept: CARDIOLOGY | Facility: CLINIC | Age: 74
End: 2024-07-05
Payer: MEDICARE

## 2024-07-05 ENCOUNTER — TELEPHONE (OUTPATIENT)
Dept: PRIMARY CARE CLINIC | Facility: CLINIC | Age: 74
End: 2024-07-05
Payer: MEDICARE

## 2024-07-05 ENCOUNTER — INFUSION (OUTPATIENT)
Dept: INFECTIOUS DISEASES | Facility: HOSPITAL | Age: 74
End: 2024-07-05
Payer: MEDICARE

## 2024-07-05 VITALS
TEMPERATURE: 98 F | SYSTOLIC BLOOD PRESSURE: 142 MMHG | HEIGHT: 74 IN | HEART RATE: 75 BPM | BODY MASS INDEX: 26.36 KG/M2 | RESPIRATION RATE: 18 BRPM | DIASTOLIC BLOOD PRESSURE: 78 MMHG | WEIGHT: 205.38 LBS | OXYGEN SATURATION: 100 %

## 2024-07-05 DIAGNOSIS — N18.32 TYPE 2 DIABETES MELLITUS WITH STAGE 3B CHRONIC KIDNEY DISEASE, WITHOUT LONG-TERM CURRENT USE OF INSULIN: Primary | ICD-10-CM

## 2024-07-05 DIAGNOSIS — Z93.3 COLOSTOMY PRESENT: ICD-10-CM

## 2024-07-05 DIAGNOSIS — E11.22 TYPE 2 DIABETES MELLITUS WITH STAGE 3B CHRONIC KIDNEY DISEASE, WITHOUT LONG-TERM CURRENT USE OF INSULIN: Primary | ICD-10-CM

## 2024-07-05 DIAGNOSIS — R55 SYNCOPE, UNSPECIFIED SYNCOPE TYPE: Primary | ICD-10-CM

## 2024-07-05 DIAGNOSIS — R55 SYNCOPE, UNSPECIFIED SYNCOPE TYPE: ICD-10-CM

## 2024-07-05 DIAGNOSIS — E86.0 DEHYDRATION: ICD-10-CM

## 2024-07-05 LAB
BILIRUB UR QL STRIP: NEGATIVE
CLARITY UR REFRACT.AUTO: CLEAR
COLOR UR AUTO: YELLOW
GLUCOSE UR QL STRIP: ABNORMAL
HGB UR QL STRIP: NEGATIVE
KETONES UR QL STRIP: NEGATIVE
LEUKOCYTE ESTERASE UR QL STRIP: NEGATIVE
NITRITE UR QL STRIP: NEGATIVE
OHS QRS DURATION: 128 MS
OHS QTC CALCULATION: 529 MS
PH UR STRIP: 6 [PH] (ref 5–8)
PROT UR QL STRIP: NEGATIVE
SP GR UR STRIP: 1.01 (ref 1–1.03)
URN SPEC COLLECT METH UR: ABNORMAL

## 2024-07-05 PROCEDURE — 81003 URINALYSIS AUTO W/O SCOPE: CPT | Mod: HCNC | Performed by: INTERNAL MEDICINE

## 2024-07-05 PROCEDURE — 25000003 PHARM REV CODE 250: Mod: HCNC | Performed by: STUDENT IN AN ORGANIZED HEALTH CARE EDUCATION/TRAINING PROGRAM

## 2024-07-05 PROCEDURE — 96360 HYDRATION IV INFUSION INIT: CPT | Mod: HCNC

## 2024-07-05 PROCEDURE — 93005 ELECTROCARDIOGRAM TRACING: CPT | Mod: HCNC,S$GLB,, | Performed by: INTERNAL MEDICINE

## 2024-07-05 PROCEDURE — 93010 ELECTROCARDIOGRAM REPORT: CPT | Mod: HCNC,S$GLB,, | Performed by: INTERNAL MEDICINE

## 2024-07-05 RX ORDER — HEPARIN 100 UNIT/ML
500 SYRINGE INTRAVENOUS
OUTPATIENT
Start: 2024-07-08

## 2024-07-05 RX ORDER — SODIUM CHLORIDE 0.9 % (FLUSH) 0.9 %
10 SYRINGE (ML) INJECTION
OUTPATIENT
Start: 2024-07-08

## 2024-07-05 RX ORDER — SODIUM CHLORIDE 0.9 % (FLUSH) 0.9 %
10 SYRINGE (ML) INJECTION
Status: DISCONTINUED | OUTPATIENT
Start: 2024-07-05 | End: 2024-07-05 | Stop reason: HOSPADM

## 2024-07-05 RX ADMIN — SODIUM CHLORIDE 1000 ML: 9 INJECTION, SOLUTION INTRAVENOUS at 08:07

## 2024-07-05 NOTE — PROGRESS NOTES
Patient received 1L NS bolus over 1 hr as ordered.     Next appointment scheduled and future appointments made till September 2024. Patient made aware.     Limited head-to-toe assessment due to privacy issues and visit reason though the opportunity was given for patient to express any concerns.    Per Dr. Chaney - labs and EKG ordered. Urinalysis collected and sent. Pt completed EKG and blood work scheduled as lab draw.

## 2024-07-05 NOTE — PROGRESS NOTES
"Called to EKG by [employer] for concern that patient reported "passing out" in the parking garage. Has a bump to upper left side of head. Noted swelling, no laceration. Came to us ambulatory from infusion where he reported this event to the nursing staff. EKG obtained and compared to previous. Patient stated he was to return to complete his infusion. As he is not symptomatic and under the care of infusion, I took him back to infusion via wheelchair for plan of care. Notified security who plans to go to infusion to create a safety report.     "

## 2024-07-05 NOTE — PROGRESS NOTES
Note to Isabella Taylor and Carlos via Epic Secure chat:    Good morning - I hope everyone had a wonderful short holiday. Mr. Charlton comes in twice weekly for 1000cc NS infusions. He was looking a bit weak this morning and stated that he parked his truck and a wave of diaphoresis cam over him and he then woke up on the ground outside his truck. He has no idea how long he was out. He had no other complaints and states he only feels a bit weak. His BP Is usually a bit high - on arrival he was 142/78 - HR 75. He has a small knot on his head, not on blood thinners. Denies having chest pain or pain in jaw, face, shoulder or back. Denies nausea. Color good now. He states his ileostomy has been a bit overactive the past few days - glucose 164. He wants to go home after infusion, but I wanted to see if we wanted to send any lab work or give any additional fluids. Thank you so much

## 2024-07-06 PROBLEM — R55 SYNCOPE: Status: ACTIVE | Noted: 2024-07-06

## 2024-07-06 PROBLEM — R05.3 CHRONIC COUGH: Status: ACTIVE | Noted: 2024-07-06

## 2024-07-08 PROBLEM — R55 SYNCOPE AND COLLAPSE: Status: ACTIVE | Noted: 2024-07-08

## 2024-07-09 ENCOUNTER — TELEPHONE (OUTPATIENT)
Dept: CARDIOTHORACIC SURGERY | Facility: CLINIC | Age: 74
End: 2024-07-09
Payer: MEDICARE

## 2024-07-09 DIAGNOSIS — I71.21 ASCENDING AORTIC ANEURYSM, UNSPECIFIED WHETHER RUPTURED: Primary | ICD-10-CM

## 2024-07-09 PROBLEM — I27.20 PULMONARY HTN: Status: ACTIVE | Noted: 2024-07-09

## 2024-07-09 PROBLEM — U07.1 COVID-19 VIRUS INFECTION: Status: ACTIVE | Noted: 2024-07-09

## 2024-07-09 PROBLEM — R55 SYNCOPE: Status: RESOLVED | Noted: 2024-07-06 | Resolved: 2024-07-09

## 2024-07-09 PROBLEM — R55 SYNCOPE AND COLLAPSE: Status: RESOLVED | Noted: 2024-07-08 | Resolved: 2024-07-09

## 2024-07-09 NOTE — TELEPHONE ENCOUNTER
Spoke with pt  to schedule yearly follow up  with Dr. Nunez .    Pt admitted in hospital and he stated that he is going in infusion for every Tuesday and Friday so accept those days he will be fine to see Dr. Reyes on any Monday .  Pt would like to sent apt letter to his home address.   Apt letter mailed out .  Advise pt that he can call back if this time maty not work for him.    Dept direct contact no was provided to pt.  Pt verbalized understand.

## 2024-07-10 DIAGNOSIS — U07.1 COVID-19 VIRUS DETECTED: ICD-10-CM

## 2024-07-11 ENCOUNTER — PATIENT OUTREACH (OUTPATIENT)
Dept: ADMINISTRATIVE | Facility: CLINIC | Age: 74
End: 2024-07-11
Payer: MEDICARE

## 2024-07-11 NOTE — PROGRESS NOTES
C3 nurse spoke with Jarrett Charlton Jr.  for a TCC post hospital discharge follow up call. The patient has a scheduled HOSFU appointment with Poli Gonzalez MD on 07/17/2024 @ 8707.    Message sent to PCP staff.

## 2024-07-11 NOTE — PROGRESS NOTES
C3 nurse attempted to contact patient. The following occurred:   C3 nurse attempted to contact Jarrett PRADO Zoltan Fernandez  for a TCC post hospital discharge follow up call. The patient is unable to conduct the call @ this time. The patient requested a callback.    The patient has a scheduled [facility] appointment with Poli Gonzalez MD  on 07/17/2024 @ 0533. Message sent to Physician staff.

## 2024-07-12 ENCOUNTER — INFUSION (OUTPATIENT)
Dept: INFECTIOUS DISEASES | Facility: HOSPITAL | Age: 74
End: 2024-07-12
Payer: MEDICARE

## 2024-07-12 VITALS
HEIGHT: 74 IN | HEART RATE: 87 BPM | OXYGEN SATURATION: 98 % | BODY MASS INDEX: 26.86 KG/M2 | RESPIRATION RATE: 18 BRPM | SYSTOLIC BLOOD PRESSURE: 146 MMHG | WEIGHT: 209.31 LBS | TEMPERATURE: 98 F | DIASTOLIC BLOOD PRESSURE: 77 MMHG

## 2024-07-12 DIAGNOSIS — E11.22 TYPE 2 DIABETES MELLITUS WITH STAGE 3B CHRONIC KIDNEY DISEASE, WITHOUT LONG-TERM CURRENT USE OF INSULIN: Primary | ICD-10-CM

## 2024-07-12 DIAGNOSIS — E86.0 DEHYDRATION: ICD-10-CM

## 2024-07-12 DIAGNOSIS — N18.32 TYPE 2 DIABETES MELLITUS WITH STAGE 3B CHRONIC KIDNEY DISEASE, WITHOUT LONG-TERM CURRENT USE OF INSULIN: Primary | ICD-10-CM

## 2024-07-12 DIAGNOSIS — Z93.3 COLOSTOMY PRESENT: ICD-10-CM

## 2024-07-12 PROCEDURE — 96360 HYDRATION IV INFUSION INIT: CPT | Mod: HCNC

## 2024-07-12 PROCEDURE — 25000003 PHARM REV CODE 250: Mod: HCNC | Performed by: STUDENT IN AN ORGANIZED HEALTH CARE EDUCATION/TRAINING PROGRAM

## 2024-07-12 PROCEDURE — 96361 HYDRATE IV INFUSION ADD-ON: CPT | Mod: HCNC

## 2024-07-12 RX ORDER — SODIUM CHLORIDE 0.9 % (FLUSH) 0.9 %
10 SYRINGE (ML) INJECTION
OUTPATIENT
Start: 2024-07-15

## 2024-07-12 RX ORDER — HEPARIN 100 UNIT/ML
500 SYRINGE INTRAVENOUS
OUTPATIENT
Start: 2024-07-15

## 2024-07-12 RX ADMIN — SODIUM CHLORIDE 1000 ML: 9 INJECTION, SOLUTION INTRAVENOUS at 08:07

## 2024-07-12 NOTE — PROGRESS NOTES
Patient received 1L NS bolus over 1 hr as ordered. Patient tolerated well without difficulty.     Next appointment scheduled and future appointments made till September 2024. Patient made aware.     Limited head-to-toe assessment due to privacy issues and visit reason though the opportunity was given for patient to express any concerns.                                                                                                                                                                               Pt denies any loose teeth or dentures.

## 2024-07-16 ENCOUNTER — INFUSION (OUTPATIENT)
Dept: INFECTIOUS DISEASES | Facility: HOSPITAL | Age: 74
End: 2024-07-16
Payer: MEDICARE

## 2024-07-16 VITALS
HEIGHT: 74 IN | TEMPERATURE: 99 F | WEIGHT: 201.63 LBS | RESPIRATION RATE: 19 BRPM | SYSTOLIC BLOOD PRESSURE: 177 MMHG | HEART RATE: 95 BPM | DIASTOLIC BLOOD PRESSURE: 85 MMHG | OXYGEN SATURATION: 99 % | BODY MASS INDEX: 25.88 KG/M2

## 2024-07-16 DIAGNOSIS — E86.0 DEHYDRATION: ICD-10-CM

## 2024-07-16 DIAGNOSIS — E11.22 TYPE 2 DIABETES MELLITUS WITH STAGE 3B CHRONIC KIDNEY DISEASE, WITHOUT LONG-TERM CURRENT USE OF INSULIN: Primary | ICD-10-CM

## 2024-07-16 DIAGNOSIS — N18.32 TYPE 2 DIABETES MELLITUS WITH STAGE 3B CHRONIC KIDNEY DISEASE, WITHOUT LONG-TERM CURRENT USE OF INSULIN: Primary | ICD-10-CM

## 2024-07-16 DIAGNOSIS — Z93.3 COLOSTOMY PRESENT: ICD-10-CM

## 2024-07-16 PROCEDURE — 25000003 PHARM REV CODE 250: Mod: HCNC | Performed by: STUDENT IN AN ORGANIZED HEALTH CARE EDUCATION/TRAINING PROGRAM

## 2024-07-16 PROCEDURE — 96360 HYDRATION IV INFUSION INIT: CPT | Mod: HCNC

## 2024-07-16 RX ORDER — HEPARIN 100 UNIT/ML
500 SYRINGE INTRAVENOUS
Status: CANCELLED | OUTPATIENT
Start: 2024-07-19

## 2024-07-16 RX ORDER — SODIUM CHLORIDE 0.9 % (FLUSH) 0.9 %
10 SYRINGE (ML) INJECTION
Status: CANCELLED | OUTPATIENT
Start: 2024-07-19

## 2024-07-16 RX ADMIN — SODIUM CHLORIDE 1000 ML: 9 INJECTION, SOLUTION INTRAVENOUS at 08:07

## 2024-07-17 ENCOUNTER — OFFICE VISIT (OUTPATIENT)
Dept: PRIMARY CARE CLINIC | Facility: CLINIC | Age: 74
End: 2024-07-17
Payer: MEDICARE

## 2024-07-17 VITALS
HEART RATE: 88 BPM | SYSTOLIC BLOOD PRESSURE: 130 MMHG | OXYGEN SATURATION: 97 % | HEIGHT: 74 IN | WEIGHT: 204.81 LBS | DIASTOLIC BLOOD PRESSURE: 60 MMHG | BODY MASS INDEX: 26.28 KG/M2 | RESPIRATION RATE: 18 BRPM

## 2024-07-17 DIAGNOSIS — E11.22 TYPE 2 DIABETES MELLITUS WITH STAGE 3B CHRONIC KIDNEY DISEASE, WITHOUT LONG-TERM CURRENT USE OF INSULIN: ICD-10-CM

## 2024-07-17 DIAGNOSIS — N18.32 TYPE 2 DIABETES MELLITUS WITH STAGE 3B CHRONIC KIDNEY DISEASE, WITHOUT LONG-TERM CURRENT USE OF INSULIN: ICD-10-CM

## 2024-07-17 DIAGNOSIS — E44.0 MODERATE PROTEIN-CALORIE MALNUTRITION: ICD-10-CM

## 2024-07-17 DIAGNOSIS — R53.83 FATIGUE, UNSPECIFIED TYPE: ICD-10-CM

## 2024-07-17 DIAGNOSIS — R63.4 WEIGHT LOSS: ICD-10-CM

## 2024-07-17 DIAGNOSIS — I10 ESSENTIAL HYPERTENSION: ICD-10-CM

## 2024-07-17 DIAGNOSIS — G47.00 INSOMNIA, UNSPECIFIED TYPE: ICD-10-CM

## 2024-07-17 DIAGNOSIS — U09.9 POST-COVID SYNDROME: Primary | ICD-10-CM

## 2024-07-17 DIAGNOSIS — N18.4 CKD (CHRONIC KIDNEY DISEASE), STAGE IV: ICD-10-CM

## 2024-07-17 DIAGNOSIS — U07.1 COVID-19 VIRUS INFECTION: ICD-10-CM

## 2024-07-17 PROCEDURE — 96372 THER/PROPH/DIAG INJ SC/IM: CPT | Mod: HCNC,S$GLB,, | Performed by: INTERNAL MEDICINE

## 2024-07-17 PROCEDURE — 1159F MED LIST DOCD IN RCRD: CPT | Mod: HCNC,CPTII,S$GLB, | Performed by: INTERNAL MEDICINE

## 2024-07-17 PROCEDURE — 3066F NEPHROPATHY DOC TX: CPT | Mod: HCNC,CPTII,S$GLB, | Performed by: INTERNAL MEDICINE

## 2024-07-17 PROCEDURE — 99999 PR PBB SHADOW E&M-EST. PATIENT-LVL IV: CPT | Mod: PBBFAC,HCNC,, | Performed by: INTERNAL MEDICINE

## 2024-07-17 PROCEDURE — 99214 OFFICE O/P EST MOD 30 MIN: CPT | Mod: HCNC,25,S$GLB, | Performed by: INTERNAL MEDICINE

## 2024-07-17 PROCEDURE — 3078F DIAST BP <80 MM HG: CPT | Mod: HCNC,CPTII,S$GLB, | Performed by: INTERNAL MEDICINE

## 2024-07-17 PROCEDURE — 1111F DSCHRG MED/CURRENT MED MERGE: CPT | Mod: HCNC,CPTII,S$GLB, | Performed by: INTERNAL MEDICINE

## 2024-07-17 PROCEDURE — 3060F POS MICROALBUMINURIA REV: CPT | Mod: HCNC,CPTII,S$GLB, | Performed by: INTERNAL MEDICINE

## 2024-07-17 PROCEDURE — 3075F SYST BP GE 130 - 139MM HG: CPT | Mod: HCNC,CPTII,S$GLB, | Performed by: INTERNAL MEDICINE

## 2024-07-17 PROCEDURE — 1160F RVW MEDS BY RX/DR IN RCRD: CPT | Mod: HCNC,CPTII,S$GLB, | Performed by: INTERNAL MEDICINE

## 2024-07-17 PROCEDURE — 3288F FALL RISK ASSESSMENT DOCD: CPT | Mod: HCNC,CPTII,S$GLB, | Performed by: INTERNAL MEDICINE

## 2024-07-17 PROCEDURE — 3051F HG A1C>EQUAL 7.0%<8.0%: CPT | Mod: HCNC,CPTII,S$GLB, | Performed by: INTERNAL MEDICINE

## 2024-07-17 PROCEDURE — 1100F PTFALLS ASSESS-DOCD GE2>/YR: CPT | Mod: HCNC,CPTII,S$GLB, | Performed by: INTERNAL MEDICINE

## 2024-07-17 PROCEDURE — 1126F AMNT PAIN NOTED NONE PRSNT: CPT | Mod: HCNC,CPTII,S$GLB, | Performed by: INTERNAL MEDICINE

## 2024-07-17 RX ORDER — CYANOCOBALAMIN 1000 UG/ML
2000 INJECTION, SOLUTION INTRAMUSCULAR; SUBCUTANEOUS ONCE
Status: COMPLETED | OUTPATIENT
Start: 2024-07-17 | End: 2024-07-17

## 2024-07-17 RX ORDER — FEEDER CONTAINER W-GRAVITY SET
EACH MISCELLANEOUS
Qty: 60 EACH | Refills: 3 | Status: SHIPPED | OUTPATIENT
Start: 2024-07-17

## 2024-07-17 RX ORDER — TEMAZEPAM 15 MG/1
15 CAPSULE ORAL NIGHTLY PRN
Qty: 20 CAPSULE | Refills: 1 | Status: SHIPPED | OUTPATIENT
Start: 2024-07-17

## 2024-07-17 RX ORDER — FEEDER CONTAINER W-GRAVITY SET
EACH MISCELLANEOUS
Qty: 60 EACH | Refills: 3 | Status: SHIPPED | OUTPATIENT
Start: 2024-07-17 | End: 2024-07-17

## 2024-07-17 RX ADMIN — CYANOCOBALAMIN 2000 MCG: 1000 INJECTION, SOLUTION INTRAMUSCULAR; SUBCUTANEOUS at 03:07

## 2024-07-17 NOTE — PROGRESS NOTES
Verified pt ID using name and . Monique Rose CMA. Administered B 12 2cc IM in left dorsalgluteal per physician order using aseptic technique. Aspirated and no blood return noted. Pt tolerated well with no adverse reactions noted.

## 2024-07-17 NOTE — PROGRESS NOTES
Subjective:       Patient ID: Jarrett Charlton Jr. is a 74 y.o. male.    Chief Complaint: Follow-up (hospital)    HPI  patient visit today for hospital follow-up he has a multiple medical problem including high output ileostomy requiring weekly IV fluid chronic renal insufficiency BPH high blood pressure hyperlipidemia anxiety depression gouty arthritis history of ventricular tachycardia status post ablation patient visit today for follow-up on the hospital he had COVID infection has a stage 6 day in the hospital he is doing better now still has some coughing congestion diarrhea is better.  Patient also complained of weight loss no appetite but but willing to take any supplements he denies short of breath chest pain dyspnea with exertion no dizziness or syncope patient also stopped taking Florinef doing well without it.  Patient also complained of insomnia not able to sleep for several nights tired during the daytime does not sleep during the day this is his main complaint today at the visit is insomnia  Review of Systems   Constitutional:  Negative for unexpected weight change.   Respiratory:  Positive for cough. Negative for shortness of breath.    Cardiovascular:  Negative for chest pain and palpitations.   Musculoskeletal:  Positive for arthralgias and back pain.   Psychiatric/Behavioral:  Positive for dysphoric mood.        Objective:      Physical Exam  Vitals and nursing note reviewed.   Constitutional:       General: He is not in acute distress.     Appearance: He is well-developed.   HENT:      Head: Normocephalic and atraumatic.      Right Ear: External ear normal.      Left Ear: External ear normal.      Nose: Nose normal.      Mouth/Throat:      Pharynx: No oropharyngeal exudate.   Eyes:      Conjunctiva/sclera: Conjunctivae normal.      Pupils: Pupils are equal, round, and reactive to light.   Neck:      Thyroid: No thyromegaly.   Cardiovascular:      Rate and Rhythm: Normal rate and regular rhythm.       Heart sounds: Normal heart sounds. No murmur heard.     No friction rub. No gallop.   Pulmonary:      Effort: Pulmonary effort is normal. No respiratory distress.      Breath sounds: Normal breath sounds. No wheezing.   Abdominal:      General: Bowel sounds are normal. There is no distension.      Palpations: Abdomen is soft.      Tenderness: There is no abdominal tenderness.   Musculoskeletal:         General: No tenderness or deformity. Normal range of motion.      Cervical back: Normal range of motion and neck supple.   Lymphadenopathy:      Cervical: No cervical adenopathy.   Skin:     General: Skin is warm and dry.      Findings: No erythema or rash.   Neurological:      General: No focal deficit present.      Mental Status: He is alert.   Psychiatric:         Thought Content: Thought content normal.         Judgment: Judgment normal.      Comments: Patient is having sad mood from multiple chronic disease with frequent ER and hospitalization         Assessment:       1. Post-COVID syndrome    2. Insomnia, unspecified type    3. COVID-19 virus infection    4. Fatigue, unspecified type    5. Weight loss    6. Moderate protein-calorie malnutrition    7. CKD (chronic kidney disease), stage IV    8. Type 2 diabetes mellitus with stage 3b chronic kidney disease, without long-term current use of insulin    9. Essential hypertension        Plan:       Post-COVID syndrome    Insomnia, unspecified type  -     temazepam (RESTORIL) 15 mg Cap; Take 1 capsule (15 mg total) by mouth nightly as needed (insomnia).  Dispense: 20 capsule; Refill: 1    COVID-19 virus infection  Comments:  Patient still having fatigue tired and coughing but no fever no body ache no signs of active COVID infection    Fatigue, unspecified type  Comments:  Probably secondary to post COVID and depression  Orders:  -     cyanocobalamin injection 2,000 mcg    Weight loss  Comments:  From chronic disease anxiety and depression  Orders:  -      Discontinue: nut.tx.gluc intol,lf,soy-fiber (GLUCERNA 1 DAKOTA) 0.04-1 gram-kcal/mL Liqd; 1 bottle twice aday  Dispense: 60 each; Refill: 3  -     nut.tx.gluc intol,lf,soy-fiber (GLUCERNA 1 DAKOTA) 0.04-1 gram-kcal/mL Liqd; 1 bottle twice aday  Dispense: 60 each; Refill: 3    Moderate protein-calorie malnutrition  -     Discontinue: nut.tx.gluc intol,lf,soy-fiber (GLUCERNA 1 DAKOTA) 0.04-1 gram-kcal/mL Liqd; 1 bottle twice aday  Dispense: 60 each; Refill: 3  -     nut.tx.gluc intol,lf,soy-fiber (GLUCERNA 1 DAKOTA) 0.04-1 gram-kcal/mL Liqd; 1 bottle twice aday  Dispense: 60 each; Refill: 3    CKD (chronic kidney disease), stage IV  Comments:  Improved with hydration    Type 2 diabetes mellitus with stage 3b chronic kidney disease, without long-term current use of insulin  Comments:  Fairly control with diet and med  Orders:  -     Diabetes Digital Medicine (DDMP) Enrollment Order    Essential hypertension  Comments:  Well control with diet and medication  Orders:  -     Hypertension Digital Medicine (HDMP) Enrollment Order        Medication List with Changes/Refills   New Medications    NUT.TX.GLUC INTOL,LF,SOY-FIBER (GLUCERNA 1 DAKOTA) 0.04-1 GRAM-KCAL/ML LIQD    1 bottle twice aday    TEMAZEPAM (RESTORIL) 15 MG CAP    Take 1 capsule (15 mg total) by mouth nightly as needed (insomnia).   Current Medications    ACCU-CHEK PAUL CONTROL SOLN SOLN    1 drop by NOT APPLICABLE route once daily.    ACCU-CHEK GUIDE TEST STRIPS STRP    TEST BLOOD SUGAR THREE TIMES DAILY    ACCU-CHEK SOFTCLIX LANCETS MISC    TEST BLOOD SUGAR THREE TIMES DAILY    ALLOPURINOL (ZYLOPRIM) 300 MG TABLET    Take 1.5 tablets (450 mg total) by mouth once daily.    ASPIRIN (ECOTRIN) 81 MG EC TABLET    Take 1 tablet (81 mg total) by mouth once daily.    BLOOD-GLUCOSE METER (ACCU-CHEK GUIDE GLUCOSE METER) [facility] [place]    Accucheck BID    BRIMONIDINE 0.2% (ALPHAGAN) 0.2 % DROP    Place 1 drop into both eyes 2 (two) times a day.    CALCIUM-VITAMIN D3 (OS-DAKOTA 500 + D3) 500 MG-5  MCG (200 UNIT) PER TABLET    Take 1 tablet by mouth once daily.    COLCHICINE (COLCRYS) 0.6 MG TABLET    1 po qdaily prn gouty arthritis    DROPSAFE ALCOHOL PREP PADS PADM    USE TOPICALLY 5 TIMES DAILY.    FERROUS SULFATE (IRON) 325 MG (65 MG IRON) TAB TABLET    Take 1 tablet (325 mg total) by mouth every other day.    FLUDROCORTISONE (FLORINEF) 0.1 MG TAB    Take 1 tablet (100 mcg total) by mouth 2 (two) times daily.    GABAPENTIN (NEURONTIN) 300 MG CAPSULE    Take 1 capsule (300 mg total) by mouth 3 (three) times daily. for 5 days    GLIMEPIRIDE (AMARYL) 4 MG TABLET    TAKE 1 TABLET TWICE DAILY BEFORE MEALS    LIDOCAINE (LIDODERM) 5 %    Place 1 patch onto the skin as needed. Remove & Discard patch within 12 hours or as directed by MD    MAGNESIUM OXIDE (MAG-OX) 400 MG (241.3 MG MAGNESIUM) TABLET    TAKE 1 TABLET EVERY DAY    MIDODRINE (PROAMATINE) 10 MG TABLET    Take 1 tablet (10 mg total) by mouth 3 (three) times daily as needed (Hypotension).    PANTOPRAZOLE (PROTONIX) 40 MG TABLET    Take 1 tablet (40 mg total) by mouth once daily.    PRAVASTATIN (PRAVACHOL) 40 MG TABLET    Take 1 tablet (40 mg total) by mouth once daily.    SODIUM BICARBONATE 650 MG TABLET    Take 2 tablets (1,300 mg total) by mouth 2 (two) times daily.   Discontinued Medications    MECLIZINE (ANTIVERT) 25 MG TABLET    Take 1 tablet (25 mg total) by mouth 3 (three) times daily as needed for Dizziness.    OXYCODONE (ROXICODONE) 5 MG IMMEDIATE RELEASE TABLET    Take 1 tablet (5 mg total) by mouth every 6 (six) hours as needed for Pain.

## 2024-07-19 ENCOUNTER — INFUSION (OUTPATIENT)
Dept: INFECTIOUS DISEASES | Facility: HOSPITAL | Age: 74
End: 2024-07-19
Payer: MEDICARE

## 2024-07-19 VITALS
WEIGHT: 200.5 LBS | OXYGEN SATURATION: 99 % | SYSTOLIC BLOOD PRESSURE: 170 MMHG | HEART RATE: 90 BPM | BODY MASS INDEX: 25.73 KG/M2 | TEMPERATURE: 98 F | DIASTOLIC BLOOD PRESSURE: 72 MMHG | RESPIRATION RATE: 19 BRPM | HEIGHT: 74 IN

## 2024-07-19 DIAGNOSIS — E11.22 TYPE 2 DIABETES MELLITUS WITH STAGE 3B CHRONIC KIDNEY DISEASE, WITHOUT LONG-TERM CURRENT USE OF INSULIN: Primary | ICD-10-CM

## 2024-07-19 DIAGNOSIS — E86.0 DEHYDRATION: ICD-10-CM

## 2024-07-19 DIAGNOSIS — Z93.3 COLOSTOMY PRESENT: ICD-10-CM

## 2024-07-19 DIAGNOSIS — N18.32 TYPE 2 DIABETES MELLITUS WITH STAGE 3B CHRONIC KIDNEY DISEASE, WITHOUT LONG-TERM CURRENT USE OF INSULIN: Primary | ICD-10-CM

## 2024-07-19 PROCEDURE — 25000003 PHARM REV CODE 250: Mod: HCNC | Performed by: STUDENT IN AN ORGANIZED HEALTH CARE EDUCATION/TRAINING PROGRAM

## 2024-07-19 PROCEDURE — 96360 HYDRATION IV INFUSION INIT: CPT | Mod: HCNC

## 2024-07-19 RX ORDER — SODIUM CHLORIDE 0.9 % (FLUSH) 0.9 %
10 SYRINGE (ML) INJECTION
OUTPATIENT
Start: 2024-07-22

## 2024-07-19 RX ORDER — HEPARIN 100 UNIT/ML
500 SYRINGE INTRAVENOUS
OUTPATIENT
Start: 2024-07-22

## 2024-07-19 RX ADMIN — SODIUM CHLORIDE 1000 ML: 9 INJECTION, SOLUTION INTRAVENOUS at 08:07

## 2024-07-23 ENCOUNTER — INFUSION (OUTPATIENT)
Dept: INFECTIOUS DISEASES | Facility: HOSPITAL | Age: 74
End: 2024-07-23
Payer: MEDICARE

## 2024-07-23 VITALS
TEMPERATURE: 99 F | RESPIRATION RATE: 19 BRPM | OXYGEN SATURATION: 97 % | HEIGHT: 74 IN | WEIGHT: 203.25 LBS | DIASTOLIC BLOOD PRESSURE: 86 MMHG | HEART RATE: 105 BPM | SYSTOLIC BLOOD PRESSURE: 155 MMHG | BODY MASS INDEX: 26.08 KG/M2

## 2024-07-23 DIAGNOSIS — E86.0 DEHYDRATION: ICD-10-CM

## 2024-07-23 DIAGNOSIS — Z93.3 COLOSTOMY PRESENT: ICD-10-CM

## 2024-07-23 DIAGNOSIS — N18.32 TYPE 2 DIABETES MELLITUS WITH STAGE 3B CHRONIC KIDNEY DISEASE, WITHOUT LONG-TERM CURRENT USE OF INSULIN: Primary | ICD-10-CM

## 2024-07-23 DIAGNOSIS — E11.22 TYPE 2 DIABETES MELLITUS WITH STAGE 3B CHRONIC KIDNEY DISEASE, WITHOUT LONG-TERM CURRENT USE OF INSULIN: Primary | ICD-10-CM

## 2024-07-23 PROCEDURE — 25000003 PHARM REV CODE 250: Mod: HCNC | Performed by: STUDENT IN AN ORGANIZED HEALTH CARE EDUCATION/TRAINING PROGRAM

## 2024-07-23 RX ORDER — SODIUM CHLORIDE 0.9 % (FLUSH) 0.9 %
10 SYRINGE (ML) INJECTION
Status: CANCELLED | OUTPATIENT
Start: 2024-07-26

## 2024-07-23 RX ORDER — HEPARIN 100 UNIT/ML
500 SYRINGE INTRAVENOUS
Status: CANCELLED | OUTPATIENT
Start: 2024-07-26

## 2024-07-23 RX ADMIN — SODIUM CHLORIDE 1000 ML: 9 INJECTION, SOLUTION INTRAVENOUS at 08:07

## 2024-07-23 NOTE — PROGRESS NOTES
Patient received 1L NS bolus over 1 hr as ordered. Patient tolerated well without difficulty.     Next appointment scheduled and future appointments made till September 2024. Patient made aware.     Limited head-to-toe assessment due to privacy issues and visit reason though the opportunity was given for patient to express any concerns.                                                                                                                                                                              Patient received 1L NS bolus over 1 hr as ordered. Patient tolerated well without difficulty.     Next appointment scheduled and future appointments made till September 2024. Patient made aware.     Limited head-to-toe assessment due to privacy issues and visit reason though the opportunity was given for patient to express any concerns.                                                                                                      Received 1 liter on NS bolus-Tolerated well. Next appt. On Friday.Patient aware.

## 2024-07-24 ENCOUNTER — PATIENT MESSAGE (OUTPATIENT)
Dept: PRIMARY CARE CLINIC | Facility: CLINIC | Age: 74
End: 2024-07-24
Payer: MEDICARE

## 2024-07-25 ENCOUNTER — CLINICAL SUPPORT (OUTPATIENT)
Dept: CARDIOLOGY | Facility: HOSPITAL | Age: 74
End: 2024-07-25
Attending: INTERNAL MEDICINE
Payer: MEDICARE

## 2024-07-25 DIAGNOSIS — R55 SYNCOPE AND COLLAPSE: ICD-10-CM

## 2024-07-25 DIAGNOSIS — I47.10 SUPRAVENTRICULAR TACHYCARDIA, UNSPECIFIED: ICD-10-CM

## 2024-07-25 PROCEDURE — 93298 REM INTERROG DEV EVAL SCRMS: CPT | Mod: HCNC | Performed by: INTERNAL MEDICINE

## 2024-07-25 PROCEDURE — 93298 REM INTERROG DEV EVAL SCRMS: CPT | Mod: 26,HCNC,, | Performed by: INTERNAL MEDICINE

## 2024-07-26 ENCOUNTER — INFUSION (OUTPATIENT)
Dept: INFECTIOUS DISEASES | Facility: HOSPITAL | Age: 74
End: 2024-07-26
Payer: MEDICARE

## 2024-07-26 VITALS
RESPIRATION RATE: 19 BRPM | SYSTOLIC BLOOD PRESSURE: 149 MMHG | HEIGHT: 74 IN | WEIGHT: 207.56 LBS | OXYGEN SATURATION: 95 % | TEMPERATURE: 99 F | BODY MASS INDEX: 26.64 KG/M2 | DIASTOLIC BLOOD PRESSURE: 90 MMHG | HEART RATE: 105 BPM

## 2024-07-26 DIAGNOSIS — E11.22 TYPE 2 DIABETES MELLITUS WITH STAGE 3B CHRONIC KIDNEY DISEASE, WITHOUT LONG-TERM CURRENT USE OF INSULIN: Primary | ICD-10-CM

## 2024-07-26 DIAGNOSIS — N18.32 TYPE 2 DIABETES MELLITUS WITH STAGE 3B CHRONIC KIDNEY DISEASE, WITHOUT LONG-TERM CURRENT USE OF INSULIN: Primary | ICD-10-CM

## 2024-07-26 DIAGNOSIS — E86.0 DEHYDRATION: ICD-10-CM

## 2024-07-26 DIAGNOSIS — Z93.3 COLOSTOMY PRESENT: ICD-10-CM

## 2024-07-26 LAB
OHS CV AF BURDEN PERCENT: 4.8
OHS CV DC REMOTE DEVICE TYPE: NORMAL
OHS CV ICD SHOCK: NO

## 2024-07-26 PROCEDURE — 96360 HYDRATION IV INFUSION INIT: CPT | Mod: HCNC

## 2024-07-26 PROCEDURE — 25000003 PHARM REV CODE 250: Mod: HCNC | Performed by: STUDENT IN AN ORGANIZED HEALTH CARE EDUCATION/TRAINING PROGRAM

## 2024-07-26 RX ORDER — SODIUM CHLORIDE 0.9 % (FLUSH) 0.9 %
10 SYRINGE (ML) INJECTION
OUTPATIENT
Start: 2024-07-30

## 2024-07-26 RX ORDER — HEPARIN 100 UNIT/ML
500 SYRINGE INTRAVENOUS
OUTPATIENT
Start: 2024-07-30

## 2024-07-26 RX ADMIN — SODIUM CHLORIDE 1000 ML: 9 INJECTION, SOLUTION INTRAVENOUS at 08:07

## 2024-07-29 ENCOUNTER — INFUSION (OUTPATIENT)
Dept: INFECTIOUS DISEASES | Facility: HOSPITAL | Age: 74
End: 2024-07-29
Payer: MEDICARE

## 2024-07-29 VITALS
HEIGHT: 74 IN | WEIGHT: 204.38 LBS | OXYGEN SATURATION: 98 % | BODY MASS INDEX: 26.23 KG/M2 | DIASTOLIC BLOOD PRESSURE: 89 MMHG | TEMPERATURE: 98 F | RESPIRATION RATE: 20 BRPM | SYSTOLIC BLOOD PRESSURE: 146 MMHG

## 2024-07-29 DIAGNOSIS — Z93.3 COLOSTOMY PRESENT: ICD-10-CM

## 2024-07-29 DIAGNOSIS — E86.0 DEHYDRATION: ICD-10-CM

## 2024-07-29 DIAGNOSIS — E11.22 TYPE 2 DIABETES MELLITUS WITH STAGE 3B CHRONIC KIDNEY DISEASE, WITHOUT LONG-TERM CURRENT USE OF INSULIN: Primary | ICD-10-CM

## 2024-07-29 DIAGNOSIS — N18.32 TYPE 2 DIABETES MELLITUS WITH STAGE 3B CHRONIC KIDNEY DISEASE, WITHOUT LONG-TERM CURRENT USE OF INSULIN: Primary | ICD-10-CM

## 2024-07-29 PROCEDURE — 96365 THER/PROPH/DIAG IV INF INIT: CPT | Mod: HCNC

## 2024-07-29 PROCEDURE — 25000003 PHARM REV CODE 250: Mod: HCNC | Performed by: STUDENT IN AN ORGANIZED HEALTH CARE EDUCATION/TRAINING PROGRAM

## 2024-07-29 RX ORDER — SODIUM CHLORIDE 0.9 % (FLUSH) 0.9 %
10 SYRINGE (ML) INJECTION
Status: CANCELLED | OUTPATIENT
Start: 2024-08-02

## 2024-07-29 RX ORDER — HEPARIN 100 UNIT/ML
500 SYRINGE INTRAVENOUS
Status: CANCELLED | OUTPATIENT
Start: 2024-08-02

## 2024-07-29 RX ADMIN — SODIUM CHLORIDE 1000 ML: 9 INJECTION, SOLUTION INTRAVENOUS at 09:07

## 2024-07-30 ENCOUNTER — TELEPHONE (OUTPATIENT)
Dept: ELECTROPHYSIOLOGY | Facility: CLINIC | Age: 74
End: 2024-07-30
Payer: MEDICARE

## 2024-07-30 NOTE — TELEPHONE ENCOUNTER
"----- Message from Franky Taylor MD sent at 7/30/2024  3:36 PM CDT -----  Thanks. Agree looks like AF. Let's continue to watch for now. If he has more prolonged episodes we will start him on AC.  ----- Message -----  From: Floridalma Velásquez  Sent: 7/30/2024   1:14 PM CDT  To: Franky Taylor MD    Strips were placed in your door.  There is a typo in the data below, the AF episode in June was 9 hrs.    Floridalma Miller  ----- Message -----  From: Juliette Becerril RN  Sent: 7/30/2024  12:47 PM CDT  To: Floridalma Hedrick,  Can you follow-up on this for me?  I'm not sure if Nannette had a chance to do this before she left on Thursday.      Juliette Miller  ----- Message -----  From: Franky Taylor MD  Sent: 7/25/2024   3:37 PM CDT  To: Nannette Farrell RN    Can you send me the strips or drop off outside my office? Just want to review them before making further recs. Thx. GP  ----- Message -----  From: Nannette Farrell RN  Sent: 7/25/2024  12:03 PM CDT  To: Franky Taylor MD    Billable transmission received with an AF burden of 4.8%. Pt is not on OAC and has had known episodes of AF. Transmission includes AF and tachy episodes that are c/w AF. Episodes are <6 mins, except  for 1 that was 9 mins 44 secs on 6/16/24. Your last clinic note from 7/1/24 states, "a reported history of VT, AF, and AT. Intolerant to beta blockers. Now 3 months status-post AT ablation, with only a single nonsustained AT event noted since procedure."  "

## 2024-08-02 ENCOUNTER — INFUSION (OUTPATIENT)
Dept: INFECTIOUS DISEASES | Facility: HOSPITAL | Age: 74
End: 2024-08-02
Payer: MEDICARE

## 2024-08-02 VITALS
DIASTOLIC BLOOD PRESSURE: 94 MMHG | HEART RATE: 120 BPM | WEIGHT: 205.13 LBS | SYSTOLIC BLOOD PRESSURE: 136 MMHG | TEMPERATURE: 98 F | HEIGHT: 74 IN | RESPIRATION RATE: 20 BRPM | BODY MASS INDEX: 26.33 KG/M2 | OXYGEN SATURATION: 98 %

## 2024-08-02 DIAGNOSIS — E11.22 TYPE 2 DIABETES MELLITUS WITH STAGE 3B CHRONIC KIDNEY DISEASE, WITHOUT LONG-TERM CURRENT USE OF INSULIN: Primary | ICD-10-CM

## 2024-08-02 DIAGNOSIS — I95.1 SYNCOPE DUE TO ORTHOSTATIC HYPOTENSION: ICD-10-CM

## 2024-08-02 DIAGNOSIS — N18.32 TYPE 2 DIABETES MELLITUS WITH STAGE 3B CHRONIC KIDNEY DISEASE, WITHOUT LONG-TERM CURRENT USE OF INSULIN: Primary | ICD-10-CM

## 2024-08-02 DIAGNOSIS — Z93.3 COLOSTOMY PRESENT: ICD-10-CM

## 2024-08-02 DIAGNOSIS — E86.0 DEHYDRATION: ICD-10-CM

## 2024-08-02 PROCEDURE — 25000003 PHARM REV CODE 250: Mod: HCNC | Performed by: STUDENT IN AN ORGANIZED HEALTH CARE EDUCATION/TRAINING PROGRAM

## 2024-08-02 PROCEDURE — 96360 HYDRATION IV INFUSION INIT: CPT | Mod: HCNC

## 2024-08-02 RX ORDER — SODIUM CHLORIDE 0.9 % (FLUSH) 0.9 %
10 SYRINGE (ML) INJECTION
OUTPATIENT
Start: 2024-08-06

## 2024-08-02 RX ORDER — HEPARIN 100 UNIT/ML
500 SYRINGE INTRAVENOUS
OUTPATIENT
Start: 2024-08-06

## 2024-08-02 RX ORDER — MIDODRINE HYDROCHLORIDE 10 MG/1
TABLET ORAL
Qty: 270 TABLET | Refills: 3 | Status: SHIPPED | OUTPATIENT
Start: 2024-08-02

## 2024-08-02 RX ADMIN — SODIUM CHLORIDE 1000 ML: 9 INJECTION, SOLUTION INTRAVENOUS at 08:08

## 2024-08-02 NOTE — PROGRESS NOTES
Patient received 1L NS bolus over 1 hr as ordered. Patient tolerated well without difficulty.     Next appointment scheduled and future appointments made till october 2024. Patient made aware.     Limited head-to-toe assessment due to privacy issues and visit reason though the opportunity was given for patient to express any concerns.                                                                                                                                                                              Patient received 1L NS bolus over 1 hr as ordered. Patient tolerated well without difficulty.     Next appointment scheduled and future appointments made till September 2024. Patient made aware.     Limited head-to-toe assessment due to privacy issues and visit reason though the opportunity was given for patient to express any concerns.                                                                                                      Received 1 liter on NS bolus-Tolerated well. Next appt. On Friday.Patient aware.

## 2024-08-04 PROBLEM — I48.91 A-FIB: Status: ACTIVE | Noted: 2024-04-30

## 2024-08-05 PROBLEM — R00.0 TACHYCARDIA: Status: ACTIVE | Noted: 2021-04-05

## 2024-08-06 PROBLEM — R06.02 SHORTNESS OF BREATH: Status: RESOLVED | Noted: 2020-06-10 | Resolved: 2024-08-06

## 2024-08-06 PROBLEM — R06.02 SHORTNESS OF BREATH: Status: ACTIVE | Noted: 2020-06-10

## 2024-08-06 PROBLEM — I50.23 ACUTE ON CHRONIC SYSTOLIC HEART FAILURE: Status: ACTIVE | Noted: 2024-08-06

## 2024-08-07 PROBLEM — N18.4 ACUTE RENAL FAILURE SUPERIMPOSED ON STAGE 4 CHRONIC KIDNEY DISEASE: Status: ACTIVE | Noted: 2019-12-28

## 2024-08-09 PROBLEM — I50.20 HFREF (HEART FAILURE WITH REDUCED EJECTION FRACTION): Status: ACTIVE | Noted: 2024-08-09

## 2024-08-09 PROBLEM — I50.23 ACUTE ON CHRONIC SYSTOLIC HEART FAILURE: Status: RESOLVED | Noted: 2024-08-06 | Resolved: 2024-08-09

## 2024-08-09 PROBLEM — R00.0 TACHYCARDIA: Status: RESOLVED | Noted: 2021-04-05 | Resolved: 2024-08-09

## 2024-08-09 PROBLEM — E87.6 HYPOKALEMIA: Status: RESOLVED | Noted: 2022-03-09 | Resolved: 2024-08-09

## 2024-08-13 ENCOUNTER — INFUSION (OUTPATIENT)
Dept: INFECTIOUS DISEASES | Facility: HOSPITAL | Age: 74
End: 2024-08-13
Payer: MEDICARE

## 2024-08-13 VITALS
BODY MASS INDEX: 25.08 KG/M2 | WEIGHT: 195.44 LBS | RESPIRATION RATE: 18 BRPM | SYSTOLIC BLOOD PRESSURE: 111 MMHG | TEMPERATURE: 99 F | OXYGEN SATURATION: 96 % | HEART RATE: 107 BPM | DIASTOLIC BLOOD PRESSURE: 63 MMHG | HEIGHT: 74 IN

## 2024-08-13 DIAGNOSIS — E86.0 DEHYDRATION: ICD-10-CM

## 2024-08-13 DIAGNOSIS — Z93.3 COLOSTOMY PRESENT: ICD-10-CM

## 2024-08-13 DIAGNOSIS — N18.32 TYPE 2 DIABETES MELLITUS WITH STAGE 3B CHRONIC KIDNEY DISEASE, WITHOUT LONG-TERM CURRENT USE OF INSULIN: Primary | ICD-10-CM

## 2024-08-13 DIAGNOSIS — E11.22 TYPE 2 DIABETES MELLITUS WITH STAGE 3B CHRONIC KIDNEY DISEASE, WITHOUT LONG-TERM CURRENT USE OF INSULIN: Primary | ICD-10-CM

## 2024-08-13 PROCEDURE — 25000003 PHARM REV CODE 250: Mod: HCNC | Performed by: STUDENT IN AN ORGANIZED HEALTH CARE EDUCATION/TRAINING PROGRAM

## 2024-08-13 PROCEDURE — 96360 HYDRATION IV INFUSION INIT: CPT | Mod: HCNC

## 2024-08-13 RX ORDER — HEPARIN 100 UNIT/ML
500 SYRINGE INTRAVENOUS
OUTPATIENT
Start: 2024-08-16

## 2024-08-13 RX ORDER — SODIUM CHLORIDE 0.9 % (FLUSH) 0.9 %
10 SYRINGE (ML) INJECTION
OUTPATIENT
Start: 2024-08-16

## 2024-08-13 RX ADMIN — SODIUM CHLORIDE 1000 ML: 9 INJECTION, SOLUTION INTRAVENOUS at 08:08

## 2024-08-14 ENCOUNTER — OFFICE VISIT (OUTPATIENT)
Dept: PRIMARY CARE CLINIC | Facility: CLINIC | Age: 74
End: 2024-08-14
Payer: MEDICARE

## 2024-08-14 VITALS
BODY MASS INDEX: 24.9 KG/M2 | DIASTOLIC BLOOD PRESSURE: 64 MMHG | HEART RATE: 139 BPM | HEIGHT: 74 IN | WEIGHT: 194 LBS | RESPIRATION RATE: 18 BRPM | OXYGEN SATURATION: 98 % | SYSTOLIC BLOOD PRESSURE: 138 MMHG

## 2024-08-14 DIAGNOSIS — U09.9 POST-COVID SYNDROME: ICD-10-CM

## 2024-08-14 DIAGNOSIS — I50.20 HFREF (HEART FAILURE WITH REDUCED EJECTION FRACTION): ICD-10-CM

## 2024-08-14 DIAGNOSIS — M25.562 LEFT KNEE PAIN, UNSPECIFIED CHRONICITY: ICD-10-CM

## 2024-08-14 DIAGNOSIS — E87.6 HYPOKALEMIA: ICD-10-CM

## 2024-08-14 DIAGNOSIS — M25.552 PAIN OF LEFT HIP: Primary | ICD-10-CM

## 2024-08-14 DIAGNOSIS — H40.9 GLAUCOMA, UNSPECIFIED GLAUCOMA TYPE, UNSPECIFIED LATERALITY: ICD-10-CM

## 2024-08-14 PROBLEM — N18.30 CHRONIC KIDNEY DISEASE, STAGE 3 UNSPECIFIED: Status: ACTIVE | Noted: 2024-08-14

## 2024-08-14 PROCEDURE — 3075F SYST BP GE 130 - 139MM HG: CPT | Mod: HCNC,CPTII,S$GLB, | Performed by: INTERNAL MEDICINE

## 2024-08-14 PROCEDURE — 1159F MED LIST DOCD IN RCRD: CPT | Mod: HCNC,CPTII,S$GLB, | Performed by: INTERNAL MEDICINE

## 2024-08-14 PROCEDURE — 99214 OFFICE O/P EST MOD 30 MIN: CPT | Mod: 25,HCNC,S$GLB, | Performed by: INTERNAL MEDICINE

## 2024-08-14 PROCEDURE — 1101F PT FALLS ASSESS-DOCD LE1/YR: CPT | Mod: HCNC,CPTII,S$GLB, | Performed by: INTERNAL MEDICINE

## 2024-08-14 PROCEDURE — 3078F DIAST BP <80 MM HG: CPT | Mod: HCNC,CPTII,S$GLB, | Performed by: INTERNAL MEDICINE

## 2024-08-14 PROCEDURE — 3044F HG A1C LEVEL LT 7.0%: CPT | Mod: HCNC,CPTII,S$GLB, | Performed by: INTERNAL MEDICINE

## 2024-08-14 PROCEDURE — 1111F DSCHRG MED/CURRENT MED MERGE: CPT | Mod: HCNC,CPTII,S$GLB, | Performed by: INTERNAL MEDICINE

## 2024-08-14 PROCEDURE — 1125F AMNT PAIN NOTED PAIN PRSNT: CPT | Mod: HCNC,CPTII,S$GLB, | Performed by: INTERNAL MEDICINE

## 2024-08-14 PROCEDURE — 96372 THER/PROPH/DIAG INJ SC/IM: CPT | Mod: HCNC,S$GLB,, | Performed by: INTERNAL MEDICINE

## 2024-08-14 PROCEDURE — 1160F RVW MEDS BY RX/DR IN RCRD: CPT | Mod: HCNC,CPTII,S$GLB, | Performed by: INTERNAL MEDICINE

## 2024-08-14 PROCEDURE — 3066F NEPHROPATHY DOC TX: CPT | Mod: HCNC,CPTII,S$GLB, | Performed by: INTERNAL MEDICINE

## 2024-08-14 PROCEDURE — 3288F FALL RISK ASSESSMENT DOCD: CPT | Mod: HCNC,CPTII,S$GLB, | Performed by: INTERNAL MEDICINE

## 2024-08-14 PROCEDURE — 99999 PR PBB SHADOW E&M-EST. PATIENT-LVL V: CPT | Mod: PBBFAC,HCNC,, | Performed by: INTERNAL MEDICINE

## 2024-08-14 PROCEDURE — 3060F POS MICROALBUMINURIA REV: CPT | Mod: HCNC,CPTII,S$GLB, | Performed by: INTERNAL MEDICINE

## 2024-08-14 RX ORDER — HYDRALAZINE HYDROCHLORIDE 10 MG/1
10 TABLET, FILM COATED ORAL 3 TIMES DAILY
COMMUNITY
End: 2024-08-14 | Stop reason: SDUPTHER

## 2024-08-14 RX ORDER — BRIMONIDINE TARTRATE 2 MG/ML
1 SOLUTION/ DROPS OPHTHALMIC 2 TIMES DAILY
Qty: 10 ML | Refills: 5 | Status: SHIPPED | OUTPATIENT
Start: 2024-08-14

## 2024-08-14 RX ORDER — OXYCODONE HYDROCHLORIDE 5 MG/1
5 TABLET ORAL EVERY 12 HOURS PRN
Qty: 20 TABLET | Refills: 0 | Status: SHIPPED | OUTPATIENT
Start: 2024-08-14

## 2024-08-14 RX ORDER — HYDRALAZINE HYDROCHLORIDE 10 MG/1
10 TABLET, FILM COATED ORAL 3 TIMES DAILY
Qty: 270 TABLET | Refills: 3 | Status: SHIPPED | OUTPATIENT
Start: 2024-08-14

## 2024-08-14 RX ORDER — TRIAMCINOLONE ACETONIDE 40 MG/ML
40 INJECTION, SUSPENSION INTRA-ARTICULAR; INTRAMUSCULAR ONCE
Status: COMPLETED | OUTPATIENT
Start: 2024-08-14 | End: 2024-08-14

## 2024-08-14 RX ORDER — POTASSIUM CHLORIDE 20 MEQ/1
20 TABLET, EXTENDED RELEASE ORAL DAILY
Qty: 10 TABLET | Refills: 0 | Status: SHIPPED | OUTPATIENT
Start: 2024-08-14

## 2024-08-14 RX ADMIN — TRIAMCINOLONE ACETONIDE 40 MG: 40 INJECTION, SUSPENSION INTRA-ARTICULAR; INTRAMUSCULAR at 08:08

## 2024-08-14 NOTE — PROGRESS NOTES
Subjective:       Patient ID: Jarrett Charlton Jr. is a 74 y.o. male.    Chief Complaint: Follow-up (3 month)    HPI  patient with history of higher put ileostomy with recurrent dehydration chronic renal insufficiency and COVID infection just released from the hospital for fluid overload and short of breath from COVID infection patient also have fissure gouty arthritis today patient complained of increasing pain in the left hip left knee left leg and short of breath which about the same since discharge from the hospital no chest pain no fever chill body ache no nausea vomiting or diarrhea but complained that he not feeling good and the pain in the left lower extremity is severe at this point since his pain is so severe will have a MA to take patient to emergency room downstairs for further evaluation  Review of Systems   Constitutional:  Negative for unexpected weight change.   Respiratory:  Positive for cough and shortness of breath.    Cardiovascular:  Negative for chest pain.   Gastrointestinal:  Negative for abdominal pain.   Musculoskeletal:  Positive for arthralgias (Left hip left knee left leg pain good dorsal pedis pulse no edema).   Psychiatric/Behavioral:  Positive for dysphoric mood.        Objective:      Physical Exam  Vitals and nursing note reviewed.   Constitutional:       General: He is not in acute distress.     Appearance: He is well-developed.   HENT:      Head: Normocephalic and atraumatic.      Right Ear: External ear normal.      Left Ear: External ear normal.      Nose: Nose normal.      Mouth/Throat:      Pharynx: No oropharyngeal exudate.   Eyes:      Extraocular Movements: Extraocular movements intact.      Conjunctiva/sclera: Conjunctivae normal.      Pupils: Pupils are equal, round, and reactive to light.   Neck:      Thyroid: No thyromegaly.   Cardiovascular:      Rate and Rhythm: Normal rate and regular rhythm.      Heart sounds: Normal heart sounds. No murmur heard.     No friction  rub. No gallop.   Pulmonary:      Effort: Pulmonary effort is normal. No respiratory distress.      Breath sounds: Normal breath sounds. No wheezing.   Abdominal:      General: Bowel sounds are normal. There is no distension.      Palpations: Abdomen is soft.      Tenderness: There is no abdominal tenderness.   Musculoskeletal:         General: No tenderness or deformity. Normal range of motion.      Cervical back: Normal range of motion and neck supple.   Lymphadenopathy:      Cervical: No cervical adenopathy.   Skin:     General: Skin is warm and dry.      Findings: No erythema or rash.   Neurological:      Mental Status: He is alert and oriented to person, place, and time.   Psychiatric:         Mood and Affect: Mood normal.         Thought Content: Thought content normal.         Judgment: Judgment normal.         Assessment:       1. Pain of left hip    2. Glaucoma, unspecified glaucoma type, unspecified laterality    3. Post-COVID syndrome    4. Left knee pain, unspecified chronicity    5. Hypokalemia    6. HFrEF (heart failure with reduced ejection fraction)        Plan:       Pain of left hip  Comments:  Since pain is so severe uncontrollable will have ambulate to take patient to the ER for further workup and treatment  Orders:  -     X-Ray Hip 2 or 3 views Left with Pelvis when performed; Future; Expected date: 08/14/2024  -     X-Ray Lumbar Spine Ap And Lateral; Future; Expected date: 08/14/2024  -     CBC Auto Differential; Future; Expected date: 08/14/2024  -     Comprehensive Metabolic Panel; Future; Expected date: 08/14/2024  -     Uric Acid; Future; Expected date: 08/14/2024  -     Sedimentation rate; Future; Expected date: 08/14/2024  -     oxyCODONE (ROXICODONE) 5 MG immediate release tablet; Take 1 tablet (5 mg total) by mouth every 12 (twelve) hours as needed for Pain.  Dispense: 20 tablet; Refill: 0  -     CK; Future; Expected date: 08/14/2024  -     triamcinolone acetonide injection 40 mg  -      Ambulatory referral/consult to Pain Clinic; Future; Expected date: 08/21/2024  -     Ambulatory referral/consult to Orthopedics; Future; Expected date: 08/21/2024    Glaucoma, unspecified glaucoma type, unspecified laterality  -     brimonidine 0.2% (ALPHAGAN) 0.2 % Drop; Place 1 drop into both eyes 2 (two) times a day.  Dispense: 10 mL; Refill: 5    Post-COVID syndrome  -     BNP; Future; Expected date: 08/14/2024  -     X-Ray Chest PA And Lateral; Future; Expected date: 08/14/2024    Left knee pain, unspecified chronicity  -     X-Ray Knee 3 View Left; Future; Expected date: 08/14/2024  -     Ambulatory referral/consult to Orthopedics; Future; Expected date: 08/21/2024    Hypokalemia  -     Urinalysis; Future; Expected date: 08/14/2024  -     potassium chloride SA (K-DUR,KLOR-CON) 20 MEQ tablet; Take 1 tablet (20 mEq total) by mouth once daily.  Dispense: 10 tablet; Refill: 0    HFrEF (heart failure with reduced ejection fraction)  Comments:  Patient had recent echocardiogram ejection fraction 35% but no edema no crackles in the lung    Other orders  -     hydrALAZINE (APRESOLINE) 10 MG tablet; Take 1 tablet (10 mg total) by mouth 3 (three) times daily. Take 2 tablets three times daily as needed for elevated blood pressure  Dispense: 270 tablet; Refill: 3        Medication List with Changes/Refills   New Medications    APIXABAN (ELIQUIS) 5 MG (74 TABS) DSPK    For the first 7 days take two 5 mg tablets twice daily.  After 7 days take one 5 mg tablet twice daily.    APIXABAN (ELIQUIS) 5 MG TAB    Take 1 tablet (5 mg total) by mouth 2 (two) times daily.    HYDRALAZINE (APRESOLINE) 10 MG TABLET    Take 1 tablet (10 mg total) by mouth 3 (three) times daily. Take 2 tablets three times daily as needed for elevated blood pressure    OXYCODONE (ROXICODONE) 5 MG IMMEDIATE RELEASE TABLET    Take 1 tablet (5 mg total) by mouth every 12 (twelve) hours as needed for Pain.    POTASSIUM CHLORIDE SA (K-DUR,KLOR-CON) 20 MEQ TABLET     Take 1 tablet (20 mEq total) by mouth once daily.   Current Medications    ACCU-CHEK PAUL CONTROL SOLN SOLN    1 drop by NOT APPLICABLE route once daily.    ACCU-CHEK GUIDE TEST STRIPS STRP    TEST BLOOD SUGAR THREE TIMES DAILY    ACCU-CHEK SOFTCLIX LANCETS MISC    TEST BLOOD SUGAR THREE TIMES DAILY    ALLOPURINOL (ZYLOPRIM) 300 MG TABLET    Take 1.5 tablets (450 mg total) by mouth once daily.    ASPIRIN (ECOTRIN) 81 MG EC TABLET    Take 1 tablet (81 mg total) by mouth once daily.    BENZONATATE (TESSALON) 100 MG CAPSULE    Take 1 capsule (100 mg total) by mouth 3 (three) times daily as needed for Cough.    BLOOD-GLUCOSE METER (ACCU-CHEK GUIDE GLUCOSE METER[place] [facility] [place]ucheck BID    CALCIUM-VITAMIN D3 (OS-DAKOTA 500 + D3) 500 MG-5 MCG (200 UNIT) PER TABLET    Take 1 tablet by mouth once daily.    COLCHICINE (COLCRYS) 0.6 MG TABLET    1 po qdaily prn gouty arthritis    DROPSAFE ALCOHOL PREP PADS PADM    USE TOPICALLY 5 TIMES DAILY.    FERROUS SULFATE (IRON) 325 MG (65 MG IRON) TAB TABLET    Take 1 tablet (325 mg total) by mouth every other day.    FUROSEMIDE (LASIX) 40 MG TABLET    Take 1 tablet (40 mg total) by mouth once daily.    GABAPENTIN (NEURONTIN) 300 MG CAPSULE    Take 300 mg by mouth 2 (two) times daily.    GLIMEPIRIDE (AMARYL) 4 MG TABLET    TAKE 1 TABLET TWICE DAILY BEFORE MEALS    MAGNESIUM OXIDE (MAG-OX) 400 MG (241.3 MG MAGNESIUM) TABLET    TAKE 1 TABLET EVERY DAY    METOPROLOL SUCCINATE (TOPROL-XL) 50 MG 24 HR TABLET    Take 1 tablet (50 mg total) by mouth once daily.    MIDODRINE (PROAMATINE) 10 MG TABLET    TAKE 1 TABLET THREE TIMES DAILY AS NEEDED FOR HYPOTENSION    NUT.TX.GLUC INTOL,LF,SOY-FIBER (GLUCERNA 1 DAKOTA) 0.04-1 GRAM-KCAL/ML LIQD    1 bottle twice aday    PANTOPRAZOLE (PROTONIX) 40 MG TABLET    Take 1 tablet (40 mg total) by mouth once daily.    PRAVASTATIN (PRAVACHOL) 40 MG TABLET    Take 1 tablet (40 mg total) by mouth once daily.    SODIUM BICARBONATE 650 MG TABLET    Take 2 tablets  (1,300 mg total) by mouth 2 (two) times daily.    TEMAZEPAM (RESTORIL) 15 MG CAP    Take 1 capsule (15 mg total) by mouth nightly as needed (insomnia).   Changed and/or Refilled Medications    Modified Medication Previous Medication    BRIMONIDINE 0.2% (ALPHAGAN) 0.2 % DROP brimonidine 0.2% (ALPHAGAN) 0.2 % Drop       Place 1 drop into both eyes 2 (two) times a day.    Place 1 drop into both eyes 2 (two) times a day.   Discontinued Medications    HYDRALAZINE (APRESOLINE) 10 MG TABLET    Take 10 mg by mouth 3 (three) times daily. Take 2 tablets three times daily as needed for elevated blood pressure

## 2024-08-14 NOTE — PROGRESS NOTES
Verified pt ID using name and . Brittney. Administered Kenalog 40mg in left dorsalgluteal per physician order using aseptic technique. Aspirated and no blood return noted. Pt tolerated well with no adverse reactions noted.

## 2024-08-15 PROBLEM — I48.91 ATRIAL FIBRILLATION WITH RVR: Status: RESOLVED | Noted: 2024-04-30 | Resolved: 2024-08-15

## 2024-08-16 ENCOUNTER — PATIENT OUTREACH (OUTPATIENT)
Dept: ADMINISTRATIVE | Facility: CLINIC | Age: 74
End: 2024-08-16
Payer: MEDICARE

## 2024-08-16 NOTE — PROGRESS NOTES
C3 nurse spoke with Jarrett Charlton Jr. for a TCC post hospital discharge follow up call. The patient has a scheduled HOSFU appointment with Poli Gonzalez MD (PCP) on 8/28/24 @ 12:15pm.

## 2024-08-19 NOTE — TELEPHONE ENCOUNTER
----- Message from Pilar Bonner sent at 8/19/2024  2:20 PM CDT -----  Contact: [phone]  Requesting an RX refill or new RX.    Is this a refill or new RX: refill    RX name and strength (copy/paste from chart):  hydrALAZINE (APRESOLINE) 10 MG tablet    Is this a 30 day or 90 day RX: 90    Pharmacy name and phone # (copy/paste from chart):    [facility] - [facility] [address]  [address] [facility] [facility] [address]  Phone: [phone] Fax: [phone]       The doctors have asked that we provide their patients with the following 2 reminders -- prescription refills can take up to 72 hours, and a friendly reminder that in the future you can use your [facility] account to request refills: yes pt states [facility] has contacted him twice today and they states they have not received this for the pt please advise

## 2024-08-19 NOTE — TELEPHONE ENCOUNTER
No care due was identified.  [facility] Embedded Care Due Messages. Reference number: [id number].   8/19/2024 2:28:34 PM CDT

## 2024-08-20 ENCOUNTER — INFUSION (OUTPATIENT)
Dept: INFECTIOUS DISEASES | Facility: HOSPITAL | Age: 74
End: 2024-08-20
Payer: MEDICARE

## 2024-08-20 VITALS
RESPIRATION RATE: 18 BRPM | BODY MASS INDEX: 24.91 KG/M2 | HEIGHT: 74 IN | DIASTOLIC BLOOD PRESSURE: 70 MMHG | HEART RATE: 64 BPM | TEMPERATURE: 98 F | OXYGEN SATURATION: 99 % | SYSTOLIC BLOOD PRESSURE: 102 MMHG

## 2024-08-20 DIAGNOSIS — E11.22 TYPE 2 DIABETES MELLITUS WITH STAGE 3B CHRONIC KIDNEY DISEASE, WITHOUT LONG-TERM CURRENT USE OF INSULIN: Primary | ICD-10-CM

## 2024-08-20 DIAGNOSIS — Z93.3 COLOSTOMY PRESENT: ICD-10-CM

## 2024-08-20 DIAGNOSIS — N18.32 TYPE 2 DIABETES MELLITUS WITH STAGE 3B CHRONIC KIDNEY DISEASE, WITHOUT LONG-TERM CURRENT USE OF INSULIN: Primary | ICD-10-CM

## 2024-08-20 DIAGNOSIS — E86.0 DEHYDRATION: ICD-10-CM

## 2024-08-20 PROCEDURE — 96360 HYDRATION IV INFUSION INIT: CPT | Mod: HCNC

## 2024-08-20 PROCEDURE — 25000003 PHARM REV CODE 250: Mod: HCNC | Performed by: STUDENT IN AN ORGANIZED HEALTH CARE EDUCATION/TRAINING PROGRAM

## 2024-08-20 RX ORDER — HYDRALAZINE HYDROCHLORIDE 10 MG/1
10 TABLET, FILM COATED ORAL 3 TIMES DAILY
Qty: 270 TABLET | Refills: 3 | OUTPATIENT
Start: 2024-08-20

## 2024-08-20 RX ORDER — HEPARIN 100 UNIT/ML
500 SYRINGE INTRAVENOUS
Status: CANCELLED | OUTPATIENT
Start: 2024-08-23

## 2024-08-20 RX ORDER — SODIUM CHLORIDE 0.9 % (FLUSH) 0.9 %
10 SYRINGE (ML) INJECTION
Status: CANCELLED | OUTPATIENT
Start: 2024-08-23

## 2024-08-20 RX ADMIN — SODIUM CHLORIDE 1000 ML: 9 INJECTION, SOLUTION INTRAVENOUS at 08:08

## 2024-08-20 NOTE — PROGRESS NOTES
0910 am - Patient received 1L NS bolus over 1 hr as ordered. Patient tolerated well without difficulty.     Next appointment scheduled and future appointments made till September 2024. Patient made aware.     Limited head-to-toe assessment due to privacy issues and visit reason though the opportunity was given for patient to express any concerns.

## 2024-08-23 ENCOUNTER — TELEPHONE (OUTPATIENT)
Dept: PRIMARY CARE CLINIC | Facility: CLINIC | Age: 74
End: 2024-08-23
Payer: MEDICARE

## 2024-08-23 ENCOUNTER — INFUSION (OUTPATIENT)
Dept: INFECTIOUS DISEASES | Facility: HOSPITAL | Age: 74
End: 2024-08-23
Payer: MEDICARE

## 2024-08-23 ENCOUNTER — TELEPHONE (OUTPATIENT)
Dept: ELECTROPHYSIOLOGY | Facility: CLINIC | Age: 74
End: 2024-08-23
Payer: MEDICARE

## 2024-08-23 VITALS
HEIGHT: 74 IN | OXYGEN SATURATION: 96 % | WEIGHT: 184.88 LBS | TEMPERATURE: 98 F | SYSTOLIC BLOOD PRESSURE: 132 MMHG | HEART RATE: 100 BPM | DIASTOLIC BLOOD PRESSURE: 76 MMHG | RESPIRATION RATE: 19 BRPM | BODY MASS INDEX: 23.73 KG/M2

## 2024-08-23 DIAGNOSIS — Z93.3 COLOSTOMY PRESENT: ICD-10-CM

## 2024-08-23 DIAGNOSIS — E86.0 DEHYDRATION: ICD-10-CM

## 2024-08-23 DIAGNOSIS — I47.29 PAROXYSMAL VENTRICULAR TACHYCARDIA: Primary | ICD-10-CM

## 2024-08-23 DIAGNOSIS — N18.32 TYPE 2 DIABETES MELLITUS WITH STAGE 3B CHRONIC KIDNEY DISEASE, WITHOUT LONG-TERM CURRENT USE OF INSULIN: Primary | ICD-10-CM

## 2024-08-23 DIAGNOSIS — E11.22 TYPE 2 DIABETES MELLITUS WITH STAGE 3B CHRONIC KIDNEY DISEASE, WITHOUT LONG-TERM CURRENT USE OF INSULIN: Primary | ICD-10-CM

## 2024-08-23 PROCEDURE — 96360 HYDRATION IV INFUSION INIT: CPT | Mod: HCNC

## 2024-08-23 PROCEDURE — 25000003 PHARM REV CODE 250: Mod: HCNC | Performed by: STUDENT IN AN ORGANIZED HEALTH CARE EDUCATION/TRAINING PROGRAM

## 2024-08-23 RX ORDER — HEPARIN 100 UNIT/ML
500 SYRINGE INTRAVENOUS
OUTPATIENT
Start: 2024-08-27

## 2024-08-23 RX ORDER — SODIUM CHLORIDE 0.9 % (FLUSH) 0.9 %
10 SYRINGE (ML) INJECTION
OUTPATIENT
Start: 2024-08-27

## 2024-08-23 RX ADMIN — SODIUM CHLORIDE 1000 ML: 9 INJECTION, SOLUTION INTRAVENOUS at 08:08

## 2024-08-23 NOTE — TELEPHONE ENCOUNTER
----- Message from Juan Carlos Zazueta sent at 8/21/2024  3:19 PM CDT -----  Contact: [facility] pharmacy   Pharmacy is calling to clarify an RX.    RX name:  hydrALAZINE (APRESOLINE) 10 MG tablet    What do they need to clarify:  The correct directions    Comments: [facility] - [place], [place] - [address]   Phone: [phone]  Fax: [phone]

## 2024-08-23 NOTE — PROGRESS NOTES
0920 am - Patient received 1L NS bolus over 1 hr as ordered. Patient tolerated well without difficulty.     Next appointment scheduled and future appointments made till October 2024. Patient made aware.     Limited head-to-toe assessment due to privacy issues and visit reason though the opportunity was given for patient to express any concerns.

## 2024-08-25 ENCOUNTER — CLINICAL SUPPORT (OUTPATIENT)
Dept: CARDIOLOGY | Facility: HOSPITAL | Age: 74
End: 2024-08-25
Attending: INTERNAL MEDICINE
Payer: MEDICARE

## 2024-08-25 DIAGNOSIS — R55 SYNCOPE AND COLLAPSE: ICD-10-CM

## 2024-08-25 DIAGNOSIS — I47.10 SUPRAVENTRICULAR TACHYCARDIA, UNSPECIFIED: ICD-10-CM

## 2024-08-25 PROCEDURE — 93298 REM INTERROG DEV EVAL SCRMS: CPT | Mod: HCNC | Performed by: INTERNAL MEDICINE

## 2024-08-26 ENCOUNTER — OUTPATIENT CASE MANAGEMENT (OUTPATIENT)
Dept: ADMINISTRATIVE | Facility: OTHER | Age: 74
End: 2024-08-26
Payer: MEDICARE

## 2024-08-26 NOTE — LETTER
Jarrett Charlton  [address]   [place] [address]    Dear Jarrett Charlton,     Welcome to [facility] Outpatient [facility]. We are here to assist patients with multiple long-term (chronic) conditions who often need more personalized healthcare.    It was a pleasure talking with you today. My name is Piedad Love RN. I look forward to working with you as your Care Manager. I will be contacting you by telephone routinely to help coordinate care and resolve issues.    My goal is to help you function at the healthiest and highest level possible. You can contact me directly at [phone].    As an [facility] patient with [employer], some of the services we provide, at no cost to you, include:     Development of an individualized care plan with a Registered Nurse   Connection with a   Assistance from a Community Health Worker  Connection with available resources and services    Coordinate communication among your care team members   Provide coaching and education  Help you understand your doctor's treatment plan  Help you obtain information about your insurance coverage.    All services provided by [facility] Outpatient Care Managers and other care team members are coordinated with and communicated to your primary care team.      As part of your enrollment, you will be receiving education materials and more information about these services in your [facility] account, by phone, or through the mail. If you do not wish to participate or receive information, you can Opt Out by contacting our office at [phone].      Sincerely,        Piedad Love RN  [facility]   Outpatient Care Management

## 2024-08-27 ENCOUNTER — INFUSION (OUTPATIENT)
Dept: INFECTIOUS DISEASES | Facility: HOSPITAL | Age: 74
End: 2024-08-27
Payer: MEDICARE

## 2024-08-27 VITALS
HEART RATE: 88 BPM | OXYGEN SATURATION: 99 % | HEIGHT: 74 IN | DIASTOLIC BLOOD PRESSURE: 60 MMHG | TEMPERATURE: 98 F | BODY MASS INDEX: 24.06 KG/M2 | RESPIRATION RATE: 20 BRPM | WEIGHT: 187.5 LBS | SYSTOLIC BLOOD PRESSURE: 94 MMHG

## 2024-08-27 DIAGNOSIS — E11.22 TYPE 2 DIABETES MELLITUS WITH STAGE 3B CHRONIC KIDNEY DISEASE, WITHOUT LONG-TERM CURRENT USE OF INSULIN: Primary | ICD-10-CM

## 2024-08-27 DIAGNOSIS — E86.0 DEHYDRATION: ICD-10-CM

## 2024-08-27 DIAGNOSIS — Z93.3 COLOSTOMY PRESENT: ICD-10-CM

## 2024-08-27 DIAGNOSIS — N18.32 TYPE 2 DIABETES MELLITUS WITH STAGE 3B CHRONIC KIDNEY DISEASE, WITHOUT LONG-TERM CURRENT USE OF INSULIN: Primary | ICD-10-CM

## 2024-08-27 PROCEDURE — 25000003 PHARM REV CODE 250: Mod: HCNC | Performed by: STUDENT IN AN ORGANIZED HEALTH CARE EDUCATION/TRAINING PROGRAM

## 2024-08-27 PROCEDURE — 96360 HYDRATION IV INFUSION INIT: CPT | Mod: HCNC

## 2024-08-27 RX ORDER — HEPARIN 100 UNIT/ML
500 SYRINGE INTRAVENOUS
OUTPATIENT
Start: 2024-08-30

## 2024-08-27 RX ORDER — SODIUM CHLORIDE 0.9 % (FLUSH) 0.9 %
10 SYRINGE (ML) INJECTION
OUTPATIENT
Start: 2024-08-30

## 2024-08-27 RX ADMIN — SODIUM CHLORIDE 1000 ML: 9 INJECTION, SOLUTION INTRAVENOUS at 08:08

## 2024-08-27 NOTE — PROGRESS NOTES
Patient here for 1L NS bolus over 1 hr as ordered.     Upon assessment patient reports the following - (secure chat sent to Dr. Gonzalez, Dr. JACOBY Taylor, Dr. Logan, Dr. Mota, Dr. Cullen, and Dr. Reyes)     Good morning team. I have Mr Charlton here for his scheduled NS infusion. His BP this AM is 94/60. He took Mididrine 20mg at 0500. He reports that his SBP has not been >100 since his discharge. He states that when his BP is low he gets dizzy/wobbly. He reports pain 10/10 where the blood clots are in his legs. His urine output has been decreased. He saw Dr. Gonzalez 2-3 weeks ago and reported low urine output then. He states he was told his kidneys are fine, but his urine output has not increased since then despite the diuretics. He has not been able to get an appointment with specialty physicians since discharge. On 9/9 he has an apt for chest CT then sees Dr. Reyes. On 09/30 he has EKG prior to seeing Dr. Taylor, On 10/4 he sees Dr. Logan. Is there something that should be done prior to the appointments.     Following are the responses -     Dr. Logan - First thank you for all the info. Excellent work! To be honest he sounds like he needs to be evaluated at the ED. As much as I hate to say so. I have only seen him once or twice and the diuretics and fluids together don't make sense.     Dr. Gonzalez - I agree with Dr Logan Jr he should go back to ER for further evaluation labs not sure how to get him better as out patient     Pt updated on response and will leave today's appt and go to [facility]. Pt drove her and denies dizziness at this time. States he feels well enough to drive to [facility] himself.    0911 - IVF stopped and patient left for ER. Spoke with John Paul at [facility] ED to make aware of patient en route.    Next appointment scheduled and future appointments made till October 2024. Patient made aware.     Limited head-to-toe assessment due to privacy issues and visit reason though the  opportunity was given for patient to express any concerns.

## 2024-08-28 ENCOUNTER — PATIENT MESSAGE (OUTPATIENT)
Dept: DIABETES | Facility: CLINIC | Age: 74
End: 2024-08-28
Payer: MEDICARE

## 2024-08-28 ENCOUNTER — OFFICE VISIT (OUTPATIENT)
Dept: PRIMARY CARE CLINIC | Facility: CLINIC | Age: 74
End: 2024-08-28
Payer: MEDICARE

## 2024-08-28 VITALS
WEIGHT: 186.5 LBS | RESPIRATION RATE: 18 BRPM | BODY MASS INDEX: 23.93 KG/M2 | HEART RATE: 123 BPM | OXYGEN SATURATION: 94 % | SYSTOLIC BLOOD PRESSURE: 136 MMHG | HEIGHT: 74 IN | DIASTOLIC BLOOD PRESSURE: 74 MMHG

## 2024-08-28 DIAGNOSIS — G62.9 PERIPHERAL POLYNEUROPATHY: ICD-10-CM

## 2024-08-28 DIAGNOSIS — R00.0 TACHYCARDIA: ICD-10-CM

## 2024-08-28 DIAGNOSIS — N18.4 CKD (CHRONIC KIDNEY DISEASE), STAGE IV: Primary | ICD-10-CM

## 2024-08-28 DIAGNOSIS — E87.6 HYPOKALEMIA: ICD-10-CM

## 2024-08-28 DIAGNOSIS — C61 PROSTATE CANCER: ICD-10-CM

## 2024-08-28 DIAGNOSIS — R05.8 NOCTURNAL COUGH: ICD-10-CM

## 2024-08-28 DIAGNOSIS — I82.412 ACUTE DEEP VEIN THROMBOSIS (DVT) OF FEMORAL VEIN OF LEFT LOWER EXTREMITY: ICD-10-CM

## 2024-08-28 PROCEDURE — 99999 PR PBB SHADOW E&M-EST. PATIENT-LVL V: CPT | Mod: PBBFAC,HCNC,, | Performed by: INTERNAL MEDICINE

## 2024-08-28 RX ORDER — BENZONATATE 100 MG/1
100 CAPSULE ORAL 3 TIMES DAILY PRN
Qty: 30 CAPSULE | Refills: 0 | Status: SHIPPED | OUTPATIENT
Start: 2024-08-28 | End: 2024-09-07

## 2024-08-28 RX ORDER — PREGABALIN 50 MG/1
50 CAPSULE ORAL 2 TIMES DAILY
Qty: 60 CAPSULE | Refills: 1 | Status: SHIPPED | OUTPATIENT
Start: 2024-08-28 | End: 2025-02-26

## 2024-08-28 RX ORDER — HYDROCODONE BITARTRATE AND ACETAMINOPHEN 5; 325 MG/1; MG/1
1 TABLET ORAL EVERY 8 HOURS PRN
Qty: 30 TABLET | Refills: 0 | Status: SHIPPED | OUTPATIENT
Start: 2024-08-28

## 2024-08-28 RX ORDER — POTASSIUM CHLORIDE 20 MEQ/1
20 TABLET, EXTENDED RELEASE ORAL DAILY
Qty: 10 TABLET | Refills: 0 | Status: CANCELLED | OUTPATIENT
Start: 2024-08-28

## 2024-08-28 RX ORDER — CARVEDILOL 6.25 MG/1
6.25 TABLET ORAL 2 TIMES DAILY WITH MEALS
Qty: 180 TABLET | Refills: 3 | Status: SHIPPED | OUTPATIENT
Start: 2024-08-28 | End: 2025-08-28

## 2024-08-28 NOTE — PROGRESS NOTES
Subjective:       Patient ID: Jarrett Charlton Jr. is a 74 y.o. male.    Chief Complaint: Follow-up (Hospital)    HPI  patient with multiple medical condition including history of high output ostomy requiring IV fluid frequent ER visits for hypotension history of deep vein thrombosis to left lower extremity tachycardia diabetes mellitus chronic renal insufficiency gouty arthritis peripheral neuropathy hypokalemia anxiety depression patient getting IV fluid and diuretic at the same time history of BPH status post TURP patient can not tolerate Toprol it make his blood pressure too low but has CHF with ejection fraction 35%.  Discussed with patient will repeat blood tests on Monday if kidney function is okay will hold IV fluid will also discontinue diuretic discontinue Toprol but try low-dose Coreg continue with Eliquis for DVT and pain medication for his neuropathy and multiple joint pain since he can not tolerate any NSAIDs or steroids patient live alone but his daughter is nearby who also come to visit with patient today patient is having anxiety depression from chronic medical illnesses with frequent ER visits daughter visits and IV infusion will try to simplify patient's care.  Patient has been follow with Cardiology nephrology urology cardio physiologist status post ablation  Review of Systems   Constitutional:  Negative for unexpected weight change.   Respiratory:  Positive for cough. Negative for shortness of breath.    Cardiovascular:  Positive for palpitations. Negative for chest pain.   Gastrointestinal:  Negative for abdominal pain.   Musculoskeletal:  Positive for arthralgias and myalgias.   Psychiatric/Behavioral:  Positive for dysphoric mood.        Objective:      Physical Exam  Vitals and nursing note reviewed.   Constitutional:       General: He is not in acute distress.     Appearance: He is well-developed.   HENT:      Head: Normocephalic and atraumatic.      Right Ear: External ear normal.       Left Ear: External ear normal.      Nose: Nose normal.      Mouth/Throat:      Pharynx: No oropharyngeal exudate.   Eyes:      Extraocular Movements: Extraocular movements intact.      Conjunctiva/sclera: Conjunctivae normal.      Pupils: Pupils are equal, round, and reactive to light.   Neck:      Thyroid: No thyromegaly.   Cardiovascular:      Rate and Rhythm: Normal rate and regular rhythm.      Heart sounds: Normal heart sounds. No murmur heard.     No friction rub. No gallop.   Pulmonary:      Effort: Pulmonary effort is normal. No respiratory distress.      Breath sounds: Normal breath sounds. No wheezing.   Abdominal:      General: Bowel sounds are normal. There is no distension.      Palpations: Abdomen is soft.      Tenderness: There is no abdominal tenderness.   Musculoskeletal:         General: No tenderness or deformity. Normal range of motion.      Cervical back: Normal range of motion and neck supple.   Lymphadenopathy:      Cervical: No cervical adenopathy.   Skin:     General: Skin is warm and dry.      Capillary Refill: Capillary refill takes less than 2 seconds.      Findings: No erythema or rash.   Neurological:      Mental Status: He is alert and oriented to person, place, and time.   Psychiatric:         Thought Content: Thought content normal.         Judgment: Judgment normal.         Assessment:       1. CKD (chronic kidney disease), stage IV    2. Hypokalemia    3. Peripheral polyneuropathy    4. Tachycardia    5. Acute deep vein thrombosis (DVT) of femoral vein of left lower extremity    6. Prostate cancer    7. Nocturnal cough        Plan:       CKD (chronic kidney disease), stage IV  -     CBC Auto Differential; Future; Expected date: 08/28/2024  -     Comprehensive Metabolic Panel; Future; Expected date: 08/28/2024    Hypokalemia  -     Comprehensive Metabolic Panel; Future; Expected date: 08/28/2024    Peripheral polyneuropathy  -     pregabalin (LYRICA) 50 MG capsule; Take 1 capsule  (50 mg total) by mouth 2 (two) times daily.  Dispense: 60 capsule; Refill: 1  -     HYDROcodone-acetaminophen (NORCO) 5-325 mg per tablet; Take 1 tablet by mouth every 8 (eight) hours as needed for Pain.  Dispense: 30 tablet; Refill: 0    Tachycardia  -     carvediloL (COREG) 6.25 MG tablet; Take 1 tablet (6.25 mg total) by mouth 2 (two) times daily with meals.  Dispense: 180 tablet; Refill: 3  -     TSH; Future; Expected date: 08/28/2024  -     T4, Free; Future; Expected date: 08/28/2024  -     Urinalysis; Future; Expected date: 08/28/2024  -     Ambulatory referral/consult to Cardiology; Future; Expected date: 08/28/2024    Acute deep vein thrombosis (DVT) of femoral vein of left lower extremity  -     Ambulatory referral/consult to Podiatry; Future; Expected date: 08/29/2024    Prostate cancer  -     PROSTATE SPECIFIC ANTIGEN, DIAGNOSTIC; Future; Expected date: 08/28/2024    Nocturnal cough  -     benzonatate (TESSALON) 100 MG capsule; Take 1 capsule (100 mg total) by mouth 3 (three) times daily as needed for Cough.  Dispense: 30 capsule; Refill: 0        Medication List with Changes/Refills   New Medications    CARVEDILOL (COREG) 6.25 MG TABLET    Take 1 tablet (6.25 mg total) by mouth 2 (two) times daily with meals.    HYDROCODONE-ACETAMINOPHEN (NORCO) 5-325 MG PER TABLET    Take 1 tablet by mouth every 8 (eight) hours as needed for Pain.    PREGABALIN (LYRICA) 50 MG CAPSULE    Take 1 capsule (50 mg total) by mouth 2 (two) times daily.   Current Medications    ACCU-CHEK PAUL CONTROL SOLN SOLN    1 drop by NOT APPLICABLE route once daily.    ACCU-CHEK GUIDE TEST STRIPS STRP    TEST BLOOD SUGAR THREE TIMES DAILY    ACCU-CHEK SOFTCLIX LANCETS MISC    TEST BLOOD SUGAR THREE TIMES DAILY    APIXABAN (ELIQUIS) 5 MG (74 TABS) DSPK    For the first 7 days take two 5 mg tablets twice daily.  After 7 days take one 5 mg tablet twice daily.    APIXABAN (ELIQUIS) 5 MG TAB    Take 1 tablet (5 mg total) by mouth 2 (two) times  daily.    ASPIRIN (ECOTRIN) 81 MG EC TABLET    Take 1 tablet (81 mg total) by mouth once daily.    BLOOD-GLUCOSE METER (ACCU-CHEK GUIDE GLUCOSE METER) [facility] [place]    Accuc[place]ck BID    BRIMONIDINE 0.2% (ALPHAGAN) 0.2 % DROP    Place 1 drop into both eyes 2 (two) times a day.    CALCIUM-VITAMIN D3 (OS-DAKOTA 500 + D3) 500 MG-5 MCG (200 UNIT) PER TABLET    Take 1 tablet by mouth once daily.    DROPSAFE ALCOHOL PREP PADS PADM    USE TOPICALLY 5 TIMES DAILY.    FERROUS SULFATE (IRON) 325 MG (65 MG IRON) TAB TABLET    Take 1 tablet (325 mg total) by mouth every other day.    GLIMEPIRIDE (AMARYL) 4 MG TABLET    TAKE 1 TABLET TWICE DAILY BEFORE MEALS    HYDRALAZINE (APRESOLINE) 10 MG TABLET    Take 1 tablet (10 mg total) by mouth 3 (three) times daily. Take 2 tablets three times daily as needed for elevated blood pressure    MIDODRINE (PROAMATINE) 10 MG TABLET    TAKE 1 TABLET THREE TIMES DAILY AS NEEDED FOR HYPOTENSION    NUT.TX.GLUC INTOL,LF,SOY-FIBER (GLUCERNA 1 DAKOTA) 0.04-1 GRAM-KCAL/ML LIQD    1 bottle twice aday    PRAVASTATIN (PRAVACHOL) 40 MG TABLET    Take 1 tablet (40 mg total) by mouth once daily.    SODIUM BICARBONATE 650 MG TABLET    Take 2 tablets (1,300 mg total) by mouth 2 (two) times daily.   Changed and/or Refilled Medications    Modified Medication Previous Medication    BENZONATATE (TESSALON) 100 MG CAPSULE benzonatate (TESSALON) 100 MG capsule       Take 1 capsule (100 mg total) by mouth 3 (three) times daily as needed for Cough.    Take 1 capsule (100 mg total) by mouth 3 (three) times daily as needed for Cough.    COLCHICINE (COLCRYS) 0.6 MG TABLET colchicine (COLCRYS) 0.6 mg tablet       1 po qdaily prn gouty arthritis    1 po qdaily prn gouty arthritis    PANTOPRAZOLE (PROTONIX) 40 MG TABLET pantoprazole (PROTONIX) 40 MG tablet       TAKE 1 TABLET ONE TIME DAILY    Take 1 tablet (40 mg total) by mouth once daily.   Discontinued Medications    ALLOPURINOL (ZYLOPRIM) 300 MG TABLET    Take 1.5 tablets (450 mg  total) by mouth once daily.    COLCHICINE (COLCRYS) 0.6 MG TABLET    1 po qdaily prn gouty arthritis    FUROSEMIDE (LASIX) 40 MG TABLET    Take 1 tablet (40 mg total) by mouth once daily.    GABAPENTIN (NEURONTIN) 300 MG CAPSULE    Take 300 mg by mouth 2 (two) times daily.    MAGNESIUM OXIDE (MAG-OX) 400 MG (241.3 MG MAGNESIUM) TABLET    TAKE 1 TABLET EVERY DAY    METOPROLOL SUCCINATE (TOPROL-XL) 50 MG 24 HR TABLET    Take 1 tablet (50 mg total) by mouth once daily.    OXYCODONE (ROXICODONE) 5 MG IMMEDIATE RELEASE TABLET    Take 1 tablet (5 mg total) by mouth every 12 (twelve) hours as needed for Pain.    POTASSIUM CHLORIDE SA (K-DUR,KLOR-CON) 20 MEQ TABLET    Take 1 tablet (20 mEq total) by mouth once daily.    TEMAZEPAM (RESTORIL) 15 MG CAP    Take 1 capsule (15 mg total) by mouth nightly as needed (insomnia).

## 2024-08-28 NOTE — PROGRESS NOTES
Outpatient Care Management  Initial Patient Assessment    Patient: Jarrett Charlton Jr.  MRN: [id number]  Date of Service: 08/26/2024  Completed by: Piedad Love RN  Referral Date: 08/05/2024  Date of Eligibility: 8/26/2024  Program:   High Risk  Status: Ongoing  Effective Dates: 8/28/2024 - present  Responsible Staff: [facility]PATIENT CASE MANAGEMENT [facility] HIGH RISK        Reason for Visit   Patient presents with    OPCM Enrollment Call    Nursing Assessment       Brief Summary:  Jarrett Charlton Jr. was referred by hospital provider for afib. Patient qualifies for program based on high risk.   Active problem list, medical, surgical and social history reviewed. Active comorbidities include a-fib, DVT, CHF. Areas of need identified by patient include education on signs and symptoms of A-fib, signs and symptoms of CHF exacerbation and weight gain rule, signs and symptoms of a stroke, BE FAST, and when to call 911.   Next steps: Educate patient on importance of adhering to eliquis, educating patient on CHF, A-fib, DVT, and stroke.    Disability Status  Is the patient alert and oriented (person, place, time, and situation)?: Alert and oriented x 4  Hearing Difficulty or Deaf: no  Visual Difficulty or Blind: yes  Visual and Hearing Needs Conclusion: prescription glasses with bifocals  Vision Management: Other (uses bifold prescription glasses)  Difficulty Concentrating, Remembering or Making Decisions: no  Communication Difficulty: no  Eating/Swallowing Difficulty: no  Walking or Climbing Stairs Difficulty: yes  Walking or Climbing Stairs: -- (patient uses no mobility aids but states mobility has been more difficult the last few weeks. Patient with recent DVT and pain in feet and legs.)  Mobility Management: weaker and more pain recently  Dressing/Bathing Difficulty: no  Toileting : Independent  Continence : Continence - Not a problem  Difficulty Managing Errands Independently: no  Equipment Currently Used at Home:  none  ADL Conclusion Statement: patient independent with ADL's (Patient drives and runs own errands)  Change in Functional Status Since Onset of Current Illness/Injury: yes (patient with weakness and pain with DVT)        Spiritual Beliefs  Spiritual, Cultural Beliefs, Rastafari Practices, Values that Affect Care: no      Social History     Socioeconomic History    Marital status:     Number of children: 1   Occupational History    Occupation:  x 44 years     Comment: Retired    Occupation: [place]    Tobacco Use    Smoking status: Never     Passive exposure: Never    Smokeless tobacco: Never   Substance and Sexual Activity    Alcohol use: No    Drug use: No    Sexual activity: Not Currently     Partners: Female     Social Determinants of Health     Financial Resource Strain: Low Risk  (8/5/2024)    Overall Financial Resource Strain (CARDIA)     Difficulty of Paying Living Expenses: Not very hard   Food Insecurity: No Food Insecurity (8/5/2024)    Hunger Vital Sign     Worried About Running Out of Food in the Last Year: Never true     Ran Out of Food in the Last Year: Never true   Transportation Needs: No Transportation Needs (8/5/2024)    TRANSPORTATION NEEDS     Transportation : No   Physical Activity: Sufficiently Active (8/5/2024)    Exercise Vital Sign     Days of Exercise per Week: 7 days     Minutes of Exercise per Session: 150+ min   Stress: No Stress Concern Present (8/5/2024)    [employer] - Occupational Stress Questionnaire     Feeling of Stress : Not at all   Housing Stability: Low Risk  (8/5/2024)    Housing Stability Vital Sign     Unable to Pay for Housing in the Last Year: No     Homeless in the Last Year: No       Roles and Relationships  Primary Source of Support/Comfort: child(eladio)  Name of Support/Comfort Primary Source: marguerite Zoltan daughter  Secondary Source of Support/Comfort: extended family  Name of Support/Comfort  Secondary Source: malik charlton      Advance Directives (For Healthcare)  Advance Directive  (If Adv Dir status is received, view document under Adv Dir in header or Chart Review Media tab): Patient does not have Advance Directive, declines information.  Patient Requests Assistance: Patient will do independently        Patient Reported Insurance  Verified current insurance plan:: [employer]vantage  [employer] benefits discussed:: Transportation; Mail Order Pharmacy; OTC Prescription Discounts; Silver Sneakers; Well Dine            8/27/2024     8:54 AM 8/23/2024     7:58 AM 8/20/2024     7:52 AM 7/29/2024     9:21 AM 7/26/2024     8:06 AM 7/23/2024     7:58 AM 7/19/2024     7:54 AM   Depression Patient Health Questionnaire   Over the last two weeks how often have you been bothered by little interest or pleasure in doing things Not at all Not at all Not at all Not at all Not at all Not at all Not at all   Over the last two weeks how often have you been bothered by feeling down, depressed or hopeless Not at all Not at all Not at all Not at all Not at all Not at all Not at all   PHQ-2 Total Score 0 0 0 0 0 0 0       Learning Assessment       08/28/2024 1539 [facility] (8/26/2024 - Present)   Created by Piedad Love, RN -  (Nurse) Status: Complete                 PRIMARY LEARNER     Primary Learner Name:  Jarrett CharltonJr  - 08/28/2024 1539    Relationship:  Patient  - 08/28/2024 1539    Does the primary learner have any barriers to learning?:  No Barriers  - 08/28/2024 1539    What is the preferred language of the primary learner?:  English  - 08/28/2024 1539    Is an  required?:  No  - 08/28/2024 1539    How does the primary learner prefer to learn new concepts?:  Listening  - 08/28/2024 1539    How often do you need to have someone help you read instructions, pamphlets, or written material from your doctor or pharmacy?:  Never  - 08/28/2024  1539        CO-LEARNER #1     No question answered        CO-LEARNER #2     No question answered        SPECIAL TOPICS     No question answered        ANSWERED BY:     -:  Patient WM - 08/28/2024 4936        Comments         Edit History       Piedad Love, RN -  (Nurse)   08/28/2024 1530

## 2024-08-29 ENCOUNTER — PATIENT OUTREACH (OUTPATIENT)
Dept: ADMINISTRATIVE | Facility: OTHER | Age: 74
End: 2024-08-29
Payer: MEDICARE

## 2024-08-29 ENCOUNTER — OUTPATIENT CASE MANAGEMENT (OUTPATIENT)
Dept: ADMINISTRATIVE | Facility: OTHER | Age: 74
End: 2024-08-29
Payer: MEDICARE

## 2024-08-29 LAB
OHS CV AF BURDEN PERCENT: 19.8
OHS CV DC REMOTE DEVICE TYPE: NORMAL
OHS CV ICD SHOCK: NO

## 2024-08-29 NOTE — PROGRESS NOTES
This Community Health Worker (CHW) completed Social Determinant of Health (SDOH)  Questionnaire with patient/caregiver via telephone today.  Notified [facility] CM Piedad Love RN of completion.      Patient denied any SDOH needs at this time.

## 2024-08-30 ENCOUNTER — TELEPHONE (OUTPATIENT)
Dept: PRIMARY CARE CLINIC | Facility: CLINIC | Age: 74
End: 2024-08-30
Payer: MEDICARE

## 2024-08-30 DIAGNOSIS — Z87.39 HISTORY OF GOUT: ICD-10-CM

## 2024-08-30 DIAGNOSIS — M10.9 GOUTY ARTHRITIS OF RIGHT HAND: ICD-10-CM

## 2024-08-30 RX ORDER — PANTOPRAZOLE SODIUM 40 MG/1
40 TABLET, DELAYED RELEASE ORAL
Qty: 90 TABLET | Refills: 3 | Status: SHIPPED | OUTPATIENT
Start: 2024-08-30

## 2024-08-30 NOTE — TELEPHONE ENCOUNTER
No care due was identified.  [facility] Embedded Care Due Messages. Reference number: [id number].   8/30/2024 7:40:54 AM CDT

## 2024-08-30 NOTE — TELEPHONE ENCOUNTER
----- Message from Juan Carlos Zazueta sent at 8/30/2024 12:15 PM CDT -----  Contact: [facility] pharmacy   Requesting an RX refill or new RX.    Is this a refill or new RX: refill    RX name and strength (copy/paste from chart): pantoprazole (PROTONIX) 40 MG tablet    [facility] [address]   Phone: [phone]  Fax: [phone]      Requesting an RX refill or new RX.    Is this a refill or new RX:     RX name and strength (copy/paste from chart):  colchicine (COLCRYS) 0.6 mg tablet    Is this a 30 day or 90 day RX: 90    Pharmacy name and phone # (copy/paste from chart):      The doctors have asked that we provide their patients with the following 2 reminders -- prescription refills can take up to 72 hours, and a friendly reminder that in the future you can use your [facility] account to request refills: yes     Is this a 30 day or 90 day RX:     Pharmacy name and phone # (copy/paste from chart):      The doctors have asked that we provide their patients with the following 2 reminders -- prescription refills can take up to 72 hours, and a friendly reminder that in the future you can use your [facility] account to request refills: yes  
No Heavy lifting/straining/Do not drive or operate machinery

## 2024-08-30 NOTE — TELEPHONE ENCOUNTER
Refill Decision Note   Jarrett Charlton  is requesting a refill authorization.  Brief Assessment and Rationale for Refill:  Approve     Medication Therapy Plan:         Comments:     Note composed:2:21 PM 08/30/2024

## 2024-08-31 RX ORDER — COLCHICINE 0.6 MG/1
TABLET ORAL
Qty: 30 TABLET | Refills: 0 | Status: SHIPPED | OUTPATIENT
Start: 2024-08-31 | End: 2024-08-31 | Stop reason: SDUPTHER

## 2024-08-31 RX ORDER — COLCHICINE 0.6 MG/1
TABLET ORAL
Qty: 90 TABLET | Refills: 0 | Status: SHIPPED | OUTPATIENT
Start: 2024-08-31

## 2024-09-03 ENCOUNTER — TELEPHONE (OUTPATIENT)
Dept: CARDIOLOGY | Facility: CLINIC | Age: 74
End: 2024-09-03
Payer: MEDICARE

## 2024-09-03 ENCOUNTER — TELEPHONE (OUTPATIENT)
Dept: PODIATRY | Facility: CLINIC | Age: 74
End: 2024-09-03
Payer: MEDICARE

## 2024-09-03 ENCOUNTER — OUTPATIENT CASE MANAGEMENT (OUTPATIENT)
Dept: ADMINISTRATIVE | Facility: OTHER | Age: 74
End: 2024-09-03
Payer: MEDICARE

## 2024-09-03 ENCOUNTER — INFUSION (OUTPATIENT)
Dept: INFECTIOUS DISEASES | Facility: HOSPITAL | Age: 74
End: 2024-09-03
Attending: INTERNAL MEDICINE
Payer: MEDICARE

## 2024-09-03 VITALS
HEIGHT: 74 IN | SYSTOLIC BLOOD PRESSURE: 108 MMHG | OXYGEN SATURATION: 97 % | RESPIRATION RATE: 18 BRPM | BODY MASS INDEX: 23.38 KG/M2 | WEIGHT: 182.19 LBS | DIASTOLIC BLOOD PRESSURE: 75 MMHG | TEMPERATURE: 98 F | HEART RATE: 85 BPM

## 2024-09-03 DIAGNOSIS — M10.9 GOUTY ARTHRITIS OF RIGHT HAND: ICD-10-CM

## 2024-09-03 DIAGNOSIS — R42 DIZZINESS: Primary | ICD-10-CM

## 2024-09-03 DIAGNOSIS — I95.9 HYPOTENSION, UNSPECIFIED HYPOTENSION TYPE: ICD-10-CM

## 2024-09-03 DIAGNOSIS — Z93.3 COLOSTOMY PRESENT: ICD-10-CM

## 2024-09-03 DIAGNOSIS — N18.32 TYPE 2 DIABETES MELLITUS WITH STAGE 3B CHRONIC KIDNEY DISEASE, WITHOUT LONG-TERM CURRENT USE OF INSULIN: Primary | ICD-10-CM

## 2024-09-03 DIAGNOSIS — I95.1 SYNCOPE DUE TO ORTHOSTATIC HYPOTENSION: ICD-10-CM

## 2024-09-03 DIAGNOSIS — E86.0 DEHYDRATION: ICD-10-CM

## 2024-09-03 DIAGNOSIS — E11.22 TYPE 2 DIABETES MELLITUS WITH STAGE 3B CHRONIC KIDNEY DISEASE, WITHOUT LONG-TERM CURRENT USE OF INSULIN: Primary | ICD-10-CM

## 2024-09-03 DIAGNOSIS — Z87.39 HISTORY OF GOUT: ICD-10-CM

## 2024-09-03 PROCEDURE — 96360 HYDRATION IV INFUSION INIT: CPT | Mod: HCNC

## 2024-09-03 PROCEDURE — 25000003 PHARM REV CODE 250: Mod: HCNC | Performed by: STUDENT IN AN ORGANIZED HEALTH CARE EDUCATION/TRAINING PROGRAM

## 2024-09-03 RX ORDER — HEPARIN 100 UNIT/ML
500 SYRINGE INTRAVENOUS
OUTPATIENT
Start: 2024-09-06

## 2024-09-03 RX ORDER — SODIUM CHLORIDE 0.9 % (FLUSH) 0.9 %
10 SYRINGE (ML) INJECTION
OUTPATIENT
Start: 2024-09-06

## 2024-09-03 RX ADMIN — SODIUM CHLORIDE 1000 ML: 9 INJECTION, SOLUTION INTRAVENOUS at 08:09

## 2024-09-03 NOTE — TELEPHONE ENCOUNTER
Pt rescheduled in Am w. dr LUIS E Taylor as appt available and symptomatic low bp needs addressing. He agreed to date/time of appointment(s).    None

## 2024-09-03 NOTE — TELEPHONE ENCOUNTER
----- Message from Magi Barraza RN sent at 9/3/2024 12:28 PM CDT -----  Regarding: dizziness syncope  Pt states that he has been running BPs 100s 120 and gets dizzy drowsy apparently w. BPs in that range.   yesterday? his BP Was 108/67 and when he got up his BP dropped, he was dizzy, passed out on the sofa, slept 8 hrs.   He was at an appt today and had to be escorted to his car due to dizziness.   He does not know what his HR is.  He was recently restarted on carvedilol by his primary care on 8/28 due to HR in the 120s and had a recent admission for Afib w. RVR.  I believe he has a loop recorder.    Please address the rhythm part of this issue. I will also address it in consult cardiology.

## 2024-09-03 NOTE — TELEPHONE ENCOUNTER
No care due was identified.  [facility] Embedded Care Due Messages. Reference number: [id number].   9/03/2024 12:29:20 PM CDT

## 2024-09-03 NOTE — TELEPHONE ENCOUNTER
----- Message from Mariano Antunez sent at 8/29/2024 12:11 PM CDT -----  Contact: [phone]  Requesting an RX refill or new RX.    Is this a refill or new RX: Refill    RX name and strength colchicine (COLCRYS) 0.6 mg tablet    Is this a 30 day or 90 day RX: 90    Pharmacy name and phone # [facility] - [place], [place] - [address] Phone: [phone] Fax: [phone]

## 2024-09-03 NOTE — TELEPHONE ENCOUNTER
----- Message from Russell Gamboa MD sent at 9/3/2024  2:06 PM CDT -----  Regarding: RE: low`bp, dizziness  He's had 4 admission since I saw him last and has had his medications changed by other providers.  Its too much to give a quick answer to unfortunately, so I'd recommend a clinical evaluation with any provider    I'd also recommend he reach out to whoever started the new medication - carvedilol as this could be a culprit    Russell  ----- Message -----  From: Magi Barraza, RN  Sent: 9/3/2024  12:51 PM CDT  To: Russell Gamboa MD; #  Subject: low`bp, dizziness                                Pt states that he has been running BPs 100s 120 and gets dizzy drowsy apparently w. BPs in that range.   yesterday his BP Was 108/67 HR 85 and when he got up his BP dropped, he was dizzy, passed out on the sofa, slept 8 hrs.   He was at an appt today and had to be escorted to his car due to dizziness.   He was recently restarted on carvedilol by his primary care on 8/28 due to HR in the 120s and had a recent admission for Afib w. RVR.     Since his ablation, his BP has been running from 98/66 (HR 73) to 121/68 over the past week. He has been having this dizziness issue for about 4 weeks.  He does not take any hydralazine  Takes carvedilol 6.25 bid  Takes midodrine 30 mg once only in the morning.    I sent a message to dr Taylor's team to see if some of those issues are related to rhythm (has a loop recorder).    I don't have any MD appt in the recent future and in view of cards hx, I am not sure if I should schedule him w. PA (also recent admit in ICU).    Please advise,

## 2024-09-04 ENCOUNTER — OUTPATIENT CASE MANAGEMENT (OUTPATIENT)
Dept: ADMINISTRATIVE | Facility: OTHER | Age: 74
End: 2024-09-04
Payer: MEDICARE

## 2024-09-04 ENCOUNTER — TELEPHONE (OUTPATIENT)
Dept: PRIMARY CARE CLINIC | Facility: CLINIC | Age: 74
End: 2024-09-04
Payer: MEDICARE

## 2024-09-04 ENCOUNTER — TELEPHONE (OUTPATIENT)
Dept: CARDIOLOGY | Facility: HOSPITAL | Age: 74
End: 2024-09-04
Payer: MEDICARE

## 2024-09-04 DIAGNOSIS — D64.9 ANEMIA, UNSPECIFIED TYPE: ICD-10-CM

## 2024-09-04 DIAGNOSIS — E87.6 HYPOKALEMIA: Primary | ICD-10-CM

## 2024-09-04 DIAGNOSIS — N18.4 CKD (CHRONIC KIDNEY DISEASE), STAGE IV: ICD-10-CM

## 2024-09-04 RX ORDER — COLCHICINE 0.6 MG/1
TABLET ORAL
Qty: 90 TABLET | Refills: 0 | Status: SHIPPED | OUTPATIENT
Start: 2024-09-04

## 2024-09-04 NOTE — TELEPHONE ENCOUNTER
----- Message from Piedad Love RN sent at 9/3/2024  5:00 PM CDT -----  Regarding: Digital medicine  Contact: Piedad Love RN  Hello,    Patient Jarrett Charlton has had multiple hospital admissions due to hypotension. He is still complaining of being symptomatic with his hypotension. His next appointment with Dr. Gonzalez is 9/11/2024. He is unable to get an appointment with his cardiologist until 9/25/24. Would he be a candidate for digital medicine to help monitor his blood pressures at home? If so, please place order for digital medicine.     Thank you,    SOBEIDA Gibbons, RN  [facility]  [email or web address]  TEL:  [phone]

## 2024-09-04 NOTE — TELEPHONE ENCOUNTER
Called and spoke with patient in regards to his lab results. Patient was informed that his results have not been looked at by Dr. Gonzalez but as soon as he does someone will contact him. Patient verbalized understanding. Patient also was asked about his concerns for his blood pressure and patient said that he is very frustrated right now and he will call to discuss it when he calm down or will discuss it with Dr. Gonzalez next week.

## 2024-09-04 NOTE — TELEPHONE ENCOUNTER
----- Message from Wanda Rodriguez RN sent at 9/3/2024  1:37 PM CDT -----  Regarding: FW: dizziness syncope  Sorry if this is a duplicate, looks like the message was sent directly to me and to the staff basket. Pt has an ILR, can we see what rhythm he is currently in? Thanks!  ----- Message -----  From: Magi Barraza RN  Sent: 9/3/2024  12:33 PM CDT  To: Wanda Rodriguez RN; #  Subject: dizziness syncope                                Pt states that he has been running BPs 100s 120 and gets dizzy drowsy apparently w. BPs in that range.   yesterday? his BP Was 108/67 and when he got up his BP dropped, he was dizzy, passed out on the sofa, slept 8 hrs.   He was at an appt today and had to be escorted to his car due to dizziness.   He does not know what his HR is.  He was recently restarted on carvedilol by his primary care on 8/28 due to HR in the 120s and had a recent admission for Afib w. RVR.  I believe he has a loop recorder.    Please address the rhythm part of this issue. I will also address it in consult cardiology.

## 2024-09-04 NOTE — PROGRESS NOTES
Outpatient Care Management  Plan of Care Follow Up Visit    Patient: Jarrett Charlton Jr.  MRN: [id number]  Date of Service: 09/04/2024  Completed by: Piedad Love RN  Referral Date: 08/05/2024    Reason for Visit   Patient presents with    Other     9/4/2024       Brief Summary: Reached out to [employer] and patient is eligible for Blood Pressure monitoring with Digital Medicine. Digital Medicine sent link to patient in patient messages to sign up. Called patient to notify him of eligibility and steps to enroll. Patient states he has a flip phone and will not be able to use digital medicine.

## 2024-09-04 NOTE — TELEPHONE ENCOUNTER
----- Message from Madelynbertin Blanchardmichelle sent at 9/3/2024  9:32 AM CDT -----  Contact: [phone]  [email or web address]     Patient is calling for Medical Advice regarding:Symptom: Low Blood Pressure - Caller Reports  Outcome: Talk to a nurse or provider within 15 minutes.  Reason: Getting worse    The caller accepted this outcome    How long has patient had these symptoms: all week     Pharmacy name and phone#:     [facility] [address] [place], [place] - [address]  [address]  [facility] [address]  Phone: [phone] Fax: [phone]        Patient wants a call back or thru [facility]:    Comments: Pt said all week his BP has been low and yesterday passed out and weak pt his at [facility] now he saying     Please advise patient replies from provider may take up to 48 hours.

## 2024-09-04 NOTE — TELEPHONE ENCOUNTER
"Called pt on this afternoon and assisted him in sending a manual transmission via his remote ILR home monitor as it was not communicating.  Monitor successfully reconnected.  Pt states his HR's have been "fine", in the 70's - 80's.  Report had not completing generating in [facility].  Will review remote report on return to clinic on tomorrow am.    "

## 2024-09-05 ENCOUNTER — TELEPHONE (OUTPATIENT)
Dept: ELECTROPHYSIOLOGY | Facility: CLINIC | Age: 74
End: 2024-09-05
Payer: MEDICARE

## 2024-09-05 ENCOUNTER — TELEPHONE (OUTPATIENT)
Dept: CARDIOTHORACIC SURGERY | Facility: CLINIC | Age: 74
End: 2024-09-05
Payer: MEDICARE

## 2024-09-05 NOTE — TELEPHONE ENCOUNTER
ILR remote transmission alert received today for 4 tachy episodes on 8/22/2024 and 8/23/2024  Egms c/w AF with RVR with pvcs and AT runs, v rates 188-207 bpm.  Max duration of 23 min 41 secs.      Last AF  episode was on 8/23/2024.  Pt unsure if he had symptoms during episodes.    One tachy episode noted on 7/29/24 lasting 5 secs, egm c/w nsVT.  Pt does not recall what he was doing at that time.      Pt recently went to ER on 8/27/2024 for hypotension and dizziness.     Toprol XL d/c'ed and Coreg started at 8/28/24 follow up appt.    Pt states he stopped taking Coreg today to see if his dizziness improves.  He states his blood pressure is 96/40 and his dizziness is better today.      Anticoagulation status:  Eliquis for DVT    Follow up appt on 9/30/2024    Episode on 7/29/2024

## 2024-09-06 ENCOUNTER — TELEPHONE (OUTPATIENT)
Dept: PRIMARY CARE CLINIC | Facility: CLINIC | Age: 74
End: 2024-09-06
Payer: MEDICARE

## 2024-09-06 ENCOUNTER — PATIENT MESSAGE (OUTPATIENT)
Dept: CARDIOTHORACIC SURGERY | Facility: CLINIC | Age: 74
End: 2024-09-06
Payer: MEDICARE

## 2024-09-06 NOTE — TELEPHONE ENCOUNTER
Spoke with pt regarding results. Pt would like to know how long he should hold the IVF infusions.

## 2024-09-06 NOTE — TELEPHONE ENCOUNTER
----- Message from Poli Gonzalez MD sent at 9/5/2024  4:57 AM CDT -----  Please call the patient regarding his labs kidney sl better and  anemia much better no change in tx pt can hold IVF infusion and monitor try drink more fluid repeat BMP in 10 days

## 2024-09-09 ENCOUNTER — OFFICE VISIT (OUTPATIENT)
Dept: CARDIOTHORACIC SURGERY | Facility: CLINIC | Age: 74
End: 2024-09-09
Payer: MEDICARE

## 2024-09-09 ENCOUNTER — HOSPITAL ENCOUNTER (OUTPATIENT)
Dept: RADIOLOGY | Facility: HOSPITAL | Age: 74
Discharge: HOME | End: 2024-09-09
Attending: THORACIC SURGERY (CARDIOTHORACIC VASCULAR SURGERY)
Payer: MEDICARE

## 2024-09-09 ENCOUNTER — EXTERNAL HOME HEALTH (OUTPATIENT)
Dept: HOME HEALTH SERVICES | Facility: HOSPITAL | Age: 74
End: 2024-09-09
Payer: MEDICARE

## 2024-09-09 VITALS
HEART RATE: 111 BPM | BODY MASS INDEX: 24.5 KG/M2 | HEIGHT: 74 IN | DIASTOLIC BLOOD PRESSURE: 91 MMHG | SYSTOLIC BLOOD PRESSURE: 130 MMHG | OXYGEN SATURATION: 95 % | WEIGHT: 190.94 LBS

## 2024-09-09 DIAGNOSIS — I71.21 ASCENDING AORTIC ANEURYSM, UNSPECIFIED WHETHER RUPTURED: Primary | ICD-10-CM

## 2024-09-09 DIAGNOSIS — I71.21 ASCENDING AORTIC ANEURYSM, UNSPECIFIED WHETHER RUPTURED: ICD-10-CM

## 2024-09-09 PROCEDURE — 3060F POS MICROALBUMINURIA REV: CPT | Mod: HCNC,CPTII,S$GLB, | Performed by: THORACIC SURGERY (CARDIOTHORACIC VASCULAR SURGERY)

## 2024-09-09 PROCEDURE — 99999 PR PBB SHADOW E&M-EST. PATIENT-LVL IV: CPT | Mod: PBBFAC,HCNC,, | Performed by: THORACIC SURGERY (CARDIOTHORACIC VASCULAR SURGERY)

## 2024-09-09 PROCEDURE — 1126F AMNT PAIN NOTED NONE PRSNT: CPT | Mod: HCNC,CPTII,S$GLB, | Performed by: THORACIC SURGERY (CARDIOTHORACIC VASCULAR SURGERY)

## 2024-09-09 PROCEDURE — 71250 CT THORAX DX C-: CPT | Mod: 26,HCNC,, | Performed by: RADIOLOGY

## 2024-09-09 PROCEDURE — 3080F DIAST BP >= 90 MM HG: CPT | Mod: HCNC,CPTII,S$GLB, | Performed by: THORACIC SURGERY (CARDIOTHORACIC VASCULAR SURGERY)

## 2024-09-09 PROCEDURE — 1160F RVW MEDS BY RX/DR IN RCRD: CPT | Mod: HCNC,CPTII,S$GLB, | Performed by: THORACIC SURGERY (CARDIOTHORACIC VASCULAR SURGERY)

## 2024-09-09 PROCEDURE — 71250 CT THORAX DX C-: CPT | Mod: TC,HCNC

## 2024-09-09 PROCEDURE — 1111F DSCHRG MED/CURRENT MED MERGE: CPT | Mod: HCNC,CPTII,S$GLB, | Performed by: THORACIC SURGERY (CARDIOTHORACIC VASCULAR SURGERY)

## 2024-09-09 PROCEDURE — 3044F HG A1C LEVEL LT 7.0%: CPT | Mod: HCNC,CPTII,S$GLB, | Performed by: THORACIC SURGERY (CARDIOTHORACIC VASCULAR SURGERY)

## 2024-09-09 PROCEDURE — 3075F SYST BP GE 130 - 139MM HG: CPT | Mod: HCNC,CPTII,S$GLB, | Performed by: THORACIC SURGERY (CARDIOTHORACIC VASCULAR SURGERY)

## 2024-09-09 PROCEDURE — 99213 OFFICE O/P EST LOW 20 MIN: CPT | Mod: HCNC,S$GLB,, | Performed by: THORACIC SURGERY (CARDIOTHORACIC VASCULAR SURGERY)

## 2024-09-09 PROCEDURE — 1159F MED LIST DOCD IN RCRD: CPT | Mod: HCNC,CPTII,S$GLB, | Performed by: THORACIC SURGERY (CARDIOTHORACIC VASCULAR SURGERY)

## 2024-09-09 PROCEDURE — 3066F NEPHROPATHY DOC TX: CPT | Mod: HCNC,CPTII,S$GLB, | Performed by: THORACIC SURGERY (CARDIOTHORACIC VASCULAR SURGERY)

## 2024-09-09 NOTE — PROGRESS NOTES
Subjective:      Patient ID: Jarrett Charlton Jr. is a 74 y.o. male.    Chief Complaint: No chief complaint on file.      HPI:  Jarrett Charlton Jr. is a 74 y.o. male who presents for follow up TAA. Last seen 9/19/23 at which time TAA did not meet surgical criteria. Referred by Dr. Willie dEwards. Medical conditions include DM2 c/b renal complications, CKD (baseline Cr 2.6-3 ), PAD,  HLD, HTN, ulcerative colitis, ileostomy in place, gout, BPH s/p TURP. Patient  went to the ED on 8/24/23 for general weakness and dehydration for which  an incidental 4.7 cm TAA was found on CT. Also has high output ostomy requiring IV fluid frequent ER visits for hypotension despite taking midodrine. Has at least 2-3 admissions per month. Patient admitted for afib with RVR and HFrEF in August 2024. He had an elevated D-dimer of 10.50. LE US revealed DVT in left external iliac vein, common femoral vein, and greater saphenous vein. He was started on Eliquis. Performs ADL independently. Denies assistive devices for walking. Has memory issues. He denies tobacco use and ETOH use. Denies family history of aneurysm.      Family and social history reviewed    Review of patient's allergies indicates:   Allergen Reactions    Atorvastatin     Rosuvastatin      Past Medical History:   Diagnosis Date    Basal cell carcinoma     BPH (benign prostatic hyperplasia)     s/p TURP    Carotid stenosis     Chronic kidney disease     Claudication     Coronary artery disease     DDD (degenerative disc disease) 10/21/2013    Diabetes mellitus with renal complications     Disc disease, degenerative, cervical     Encounter for blood transfusion     GERD (gastroesophageal reflux disease)     Gout, chronic     History of ulcerative colitis     s/p colectomy and ileostomy    HLD (hyperlipidemia)     HTN (hypertension)     Ileostomy in place 1982    RBBB     Squamous cell carcinoma 03/08/2018    Left superior helix near insertion    Squamous cell carcinoma  04/12/2018    Left forearm x 5    Syncope 07/06/2024    Ventricular tachycardia      Past Surgical History:   Procedure Laterality Date    ABLATION, SVT, ACCESSORY PATHWAY N/A 4/30/2024    Procedure: Ablation, SVT, Accessory Pathway;  Surgeon: Franky Taylor MD;  Location: [facility] EP LAB;  Service: Cardiology;  Laterality: N/A;  VT, RFA, Carto, MAC, GP, 3 Prep *MDT ILR in situ*    cardiac stents      CATARACT EXTRACTION Bilateral     CERVICAL FUSION      CHOLECYSTECTOMY N/A 2/14/2023    Procedure: CHOLECYSTECTOMY;  Surgeon: Mike Joyce MD;  Location: [facility] OR;  Service: General;  Laterality: N/A;  very high probabilty of converison to open    colectomy and ileostomy  1985    ENDOSCOPIC ULTRASOUND OF UPPER GASTROINTESTINAL TRACT N/A 2/10/2023    Procedure: ULTRASOUND, UPPER GI TRACT, ENDOSCOPIC;  Surgeon: Poli Fuller MD;  Location: [facility] ENDO;  Service: Endoscopy;  Laterality: N/A;    ERCP N/A 2/10/2023    Procedure: ERCP (ENDOSCOPIC RETROGRADE CHOLANGIOPANCREATOGRAPHY);  Surgeon: Poli Fuller MD;  Location: [facility] ENDO;  Service: Endoscopy;  Laterality: N/A;    EXCISION OF PAROTID GLAND Left 12/18/2020    Procedure: EXCISION, PAROTID GLAND;  Surgeon: Michael Pinzon MD;  Location: [address];  Service: ENT;  Laterality: Left;    INSERTION OF IMPLANTABLE LOOP RECORDER  06/07/2021    INSERTION OF IMPLANTABLE LOOP RECORDER Left 6/7/2021    Procedure: INSERTION, IMPLANTABLE LOOP RECORDER;  Surgeon: Romario Dao MD;  Location: [facility] CATH LAB;  Service: Cardiology;  Laterality: Left;    LEFT HEART CATHETERIZATION Right 4/15/2021    Procedure: CATHETERIZATION, HEART, LEFT;  Surgeon: Marcio Jones MD;  Location: [facility] CATH LAB;  Service: Cardiology;  Laterality: Right;    LYSIS OF ADHESIONS N/A 11/9/2020    Procedure: LYSIS, ADHESIONS,  ERAS low;  Surgeon: CED Haley MD;  [address];  Service: Colon and Rectal;  Laterality: N/A;    LYSIS OF ADHESIONS N/A 2/14/2023    Procedure:  LYSIS, ADHESIONS;  Surgeon: Mike Joyce MD;  Location: [facility];  Service: General;  Laterality: N/A;    POUCHOSCOPY N/A 4/6/2022    Procedure: ENDOSCOPY, POUCH, SMALL INTESTINE, DIAGNOSTIC;  Surgeon: Dieter Juarez MD;  Location: [facility] ENDO (2ND FLR);  Service: Endoscopy;  Laterality: N/A;    REPAIR, HERNIA, PARASTOMAL N/A 11/9/2020    Procedure: REPAIR, HERNIA, PARASTOMAL;  Surgeon: CED Haley MD;  Location: [facility] 2ND FLR;  Service: Colon and Rectal;  Laterality: N/A;    TRANSURETHRAL RESECTION OF PROSTATE (TURP) WITHOUT USE OF LASER N/A 1/23/2019    Procedure: TURP, WITHOUT USING LASER BIPOLAR;  Surgeon: Catarino Mota MD;  Location: [facility] 1ST FLR;  Service: Urology;  Laterality: N/A;  1.5 HOURS    TREATMENT OF CARDIAC ARRHYTHMIA  4/30/2024    Procedure: Cardioversion or Defibrillation;  Surgeon: Franky aTylor MD;  Location: [facility] EP LAB;  Service: Cardiology;;     Family History       Problem Relation (Age of Onset)    Cancer Father    Dementia Mother    Diabetes Father          Social History     Socioeconomic History    Marital status:     Number of children: 1   Occupational History    Occupation:  x 44 years     Comment: Retired    Occupation: [place]    Tobacco Use    Smoking status: Never     Passive exposure: Never    Smokeless tobacco: Never   Substance and Sexual Activity    Alcohol use: No    Drug use: No    Sexual activity: Not Currently     Partners: Female     Social Determinants of Health     Financial Resource Strain: Low Risk  (8/5/2024)    Overall Financial Resource Strain (CARDIA)     Difficulty of Paying Living Expenses: Not very hard   Food Insecurity: No Food Insecurity (8/29/2024)    Hunger Vital Sign     Worried About Running Out of Food in the Last Year: Never true     Ran Out of Food in the Last Year: Never true   Transportation Needs: No Transportation Needs (8/29/2024)    PRAPARE - Transportation     Lack of Transportation  (Medical): No     Lack of Transportation (Non-Medical): No   Physical Activity: Inactive (8/29/2024)    Exercise Vital Sign     Days of Exercise per Week: 0 days     Minutes of Exercise per Session: 0 min   Stress: No Stress Concern Present (8/29/2024)    [employer] - Occupational Stress Questionnaire     Feeling of Stress : Not at all   Housing Stability: Low Risk  (8/29/2024)    Housing Stability Vital Sign     Unable to Pay for Housing in the Last Year: No     Homeless in the Last Year: No       Current medications Reviewed    Review of Systems   Constitutional:  Positive for activity change.   HENT:  Negative for nosebleeds.    Eyes:  Negative for visual disturbance.   Respiratory:  Negative for shortness of breath.    Cardiovascular:  Positive for palpitations.   Gastrointestinal:  Negative for nausea.   Musculoskeletal:  Positive for arthralgias and myalgias.   Skin:  Negative for color change.   Neurological:  Negative for seizures.   Hematological:  Bruises/bleeds easily (On eliquis).   Psychiatric/Behavioral:  Negative for sleep disturbance.      Objective:   Physical Exam  Vitals reviewed.   Constitutional:       General: He is not in acute distress.     Appearance: He is well-developed. He is not diaphoretic.   HENT:      Head: Normocephalic and atraumatic.   Eyes:      Pupils: Pupils are equal, round, and reactive to light.   Neck:      Vascular: No JVD.   Cardiovascular:      Rate and Rhythm: Tachycardia present.   Pulmonary:      Effort: Pulmonary effort is normal. No respiratory distress.   Musculoskeletal:         General: Normal range of motion.      Cervical back: Normal range of motion.   Skin:     Coloration: Skin is not pale.   Neurological:      General: No focal deficit present.      Mental Status: He is alert.   Psychiatric:         Speech: Speech normal.         Behavior: Behavior normal.         Thought Content: Thought content normal.         Judgment:  Judgment normal.         Diagnostic Results:   CT from today independently measured     CT chest 8/24/23  Aorta: Fusiform aneurysmal dilatation of the ascending thoracic aorta to 4.7 cm transverse dimension.  Calcifications of the aortic valve.  Moderate atheromatous sclerosis of the aorta.  Left-sided arch.  Anatomic variant short segment common origin of the brachiocephalic and left common carotid arteries     ECHO 12/2022  The left ventricle is normal in size with mild concentric hypertrophy and normal systolic function.  The estimated ejection fraction is 60%. Technically difficult study. EF is based on limited views with poor endocardial border visualization.  Indeterminate left ventricular diastolic function.  Normal right ventricular size with normal right ventricular systolic function.  PASP is 32 plus central venous pressure which could not be determined.  The ascending aorta is mildly dilated.  LVIDD 5.1  Root 3.6.    Assessment:   TAA ~4.7cm  Plan:   Patient has TAA that remains stable at 4.7cm. Patient has multiple comorbidities and frequent ER visits for hypotension.  Patient's CT scan shows that the STA has been stable over last few visits.  In view of that finding along with his multiple comorbid conditions along with advancing age would not recommend anymore for the follow-up.  No need for serial CT scans for evaluation of TIA.  Patient understands  and is in agreement with that plan.

## 2024-09-11 PROBLEM — I50.23 ACUTE ON CHRONIC HFREF (HEART FAILURE WITH REDUCED EJECTION FRACTION): Status: ACTIVE | Noted: 2024-09-11

## 2024-09-11 PROBLEM — I48.91 ATRIAL FIBRILLATION: Status: ACTIVE | Noted: 2024-09-11

## 2024-09-12 PROBLEM — I95.89 CHRONIC HYPOTENSION: Status: ACTIVE | Noted: 2017-12-06

## 2024-09-12 PROBLEM — I50.23 ACUTE ON CHRONIC HFREF (HEART FAILURE WITH REDUCED EJECTION FRACTION): Status: RESOLVED | Noted: 2024-09-11 | Resolved: 2024-09-12

## 2024-09-12 PROBLEM — R33.9 URINARY RETENTION: Status: ACTIVE | Noted: 2024-09-12

## 2024-09-16 ENCOUNTER — OUTPATIENT CASE MANAGEMENT (OUTPATIENT)
Dept: ADMINISTRATIVE | Facility: OTHER | Age: 74
End: 2024-09-16
Payer: MEDICARE

## 2024-09-16 DIAGNOSIS — E11.22 TYPE 2 DIABETES MELLITUS WITH STAGE 3B CHRONIC KIDNEY DISEASE, WITHOUT LONG-TERM CURRENT USE OF INSULIN: ICD-10-CM

## 2024-09-16 DIAGNOSIS — E87.6 HYPOKALEMIA: ICD-10-CM

## 2024-09-16 DIAGNOSIS — N18.32 TYPE 2 DIABETES MELLITUS WITH STAGE 3B CHRONIC KIDNEY DISEASE, WITHOUT LONG-TERM CURRENT USE OF INSULIN: ICD-10-CM

## 2024-09-16 NOTE — TELEPHONE ENCOUNTER
No care due was identified.  [facility] Embedded Care Due Messages. Reference number: [id number].   9/16/2024 4:52:37 PM CDT

## 2024-09-16 NOTE — TELEPHONE ENCOUNTER
No care due was identified.  [facility] Embedded Care Due Messages. Reference number: [id number].   9/16/2024 11:43:45 AM CDT

## 2024-09-16 NOTE — PLAN OF CARE
POC reviewed. AAOx4 on RA,  VSS on room air. Complaints of headache and left sided abdominal pain. No complaints of SOB. Independent to restroom and emptying own ostomy output. Diet advanced from clear liquid diet to the regular diet.  Pt tolerated the diet well. No complaints of N/V. Pain well controlled with ordered pain medications. Pt complained of the dizziness and sweating after the scheduled dicyclomine. Pt walked down the hallways with RNRadha. Team informed about the side effects of the medicine. Reviewed the discharge instructions to the patient. Pt left the floor via wheelchair.   Male

## 2024-09-17 RX ORDER — POTASSIUM CHLORIDE 20 MEQ/1
20 TABLET, EXTENDED RELEASE ORAL
Qty: 10 TABLET | Refills: 0 | Status: SHIPPED | OUTPATIENT
Start: 2024-09-17

## 2024-09-17 RX ORDER — LANCETS
EACH MISCELLANEOUS
Qty: 300 EACH | Refills: 3 | Status: SHIPPED | OUTPATIENT
Start: 2024-09-17

## 2024-09-17 NOTE — TELEPHONE ENCOUNTER
Refill Decision Note   Jarrett Charlton  is requesting a refill authorization.  Brief Assessment and Rationale for Refill:  Approve     Medication Therapy Plan:  Reviewed acute care/admission visit notes; No follow up with PCP recommended by acute care provider; Approved per protocol      Extended chart review required: Yes   Comments:     Note composed:4:36 PM 09/17/2024

## 2024-09-18 ENCOUNTER — OFFICE VISIT (OUTPATIENT)
Dept: PRIMARY CARE CLINIC | Facility: CLINIC | Age: 74
End: 2024-09-18
Payer: MEDICARE

## 2024-09-18 VITALS
HEIGHT: 74 IN | SYSTOLIC BLOOD PRESSURE: 88 MMHG | HEART RATE: 97 BPM | OXYGEN SATURATION: 97 % | WEIGHT: 185.44 LBS | DIASTOLIC BLOOD PRESSURE: 62 MMHG | RESPIRATION RATE: 16 BRPM | BODY MASS INDEX: 23.8 KG/M2

## 2024-09-18 DIAGNOSIS — I48.91 ATRIAL FIBRILLATION WITH RVR: Primary | ICD-10-CM

## 2024-09-18 DIAGNOSIS — R23.1 PALENESS: ICD-10-CM

## 2024-09-18 DIAGNOSIS — R34 OLIGURIA: ICD-10-CM

## 2024-09-18 DIAGNOSIS — E86.1 HYPOTENSION DUE TO HYPOVOLEMIA: ICD-10-CM

## 2024-09-18 DIAGNOSIS — N18.4 CKD (CHRONIC KIDNEY DISEASE), STAGE IV: ICD-10-CM

## 2024-09-18 DIAGNOSIS — I82.512 CHRONIC DEEP VEIN THROMBOSIS (DVT) OF FEMORAL VEIN OF LEFT LOWER EXTREMITY: ICD-10-CM

## 2024-09-18 PROCEDURE — 1101F PT FALLS ASSESS-DOCD LE1/YR: CPT | Mod: HCNC,CPTII,S$GLB, | Performed by: INTERNAL MEDICINE

## 2024-09-18 PROCEDURE — 1160F RVW MEDS BY RX/DR IN RCRD: CPT | Mod: HCNC,CPTII,S$GLB, | Performed by: INTERNAL MEDICINE

## 2024-09-18 PROCEDURE — 3074F SYST BP LT 130 MM HG: CPT | Mod: HCNC,CPTII,S$GLB, | Performed by: INTERNAL MEDICINE

## 2024-09-18 PROCEDURE — 3078F DIAST BP <80 MM HG: CPT | Mod: HCNC,CPTII,S$GLB, | Performed by: INTERNAL MEDICINE

## 2024-09-18 PROCEDURE — 3044F HG A1C LEVEL LT 7.0%: CPT | Mod: HCNC,CPTII,S$GLB, | Performed by: INTERNAL MEDICINE

## 2024-09-18 PROCEDURE — 99999 PR PBB SHADOW E&M-EST. PATIENT-LVL V: CPT | Mod: PBBFAC,HCNC,, | Performed by: INTERNAL MEDICINE

## 2024-09-18 PROCEDURE — 3066F NEPHROPATHY DOC TX: CPT | Mod: HCNC,CPTII,S$GLB, | Performed by: INTERNAL MEDICINE

## 2024-09-18 PROCEDURE — 99213 OFFICE O/P EST LOW 20 MIN: CPT | Mod: HCNC,S$GLB,, | Performed by: INTERNAL MEDICINE

## 2024-09-18 PROCEDURE — 3288F FALL RISK ASSESSMENT DOCD: CPT | Mod: HCNC,CPTII,S$GLB, | Performed by: INTERNAL MEDICINE

## 2024-09-18 PROCEDURE — 3060F POS MICROALBUMINURIA REV: CPT | Mod: HCNC,CPTII,S$GLB, | Performed by: INTERNAL MEDICINE

## 2024-09-18 PROCEDURE — 1125F AMNT PAIN NOTED PAIN PRSNT: CPT | Mod: HCNC,CPTII,S$GLB, | Performed by: INTERNAL MEDICINE

## 2024-09-18 PROCEDURE — 1159F MED LIST DOCD IN RCRD: CPT | Mod: HCNC,CPTII,S$GLB, | Performed by: INTERNAL MEDICINE

## 2024-09-18 PROCEDURE — 1111F DSCHRG MED/CURRENT MED MERGE: CPT | Mod: HCNC,CPTII,S$GLB, | Performed by: INTERNAL MEDICINE

## 2024-09-18 RX ORDER — ALLOPURINOL 300 MG/1
TABLET ORAL
COMMUNITY
Start: 2024-09-17

## 2024-09-18 RX ORDER — CARVEDILOL 6.25 MG/1
6.25 TABLET ORAL 2 TIMES DAILY WITH MEALS
COMMUNITY

## 2024-09-18 NOTE — PROGRESS NOTES
Subjective:       Patient ID: Jarrett Charlton Jr. is a 74 y.o. male.    Chief Complaint: Follow-up (hospital)    HPI  Pt with h/o CHF EF 35% atrial fibrillation hypotension on didrin at home and dehyadration on IVF q week gout arthritis CRI pt just relaesed from hospital 1 week ago for fluid overload pt in office todfay for f/u he is feeling very weak dizziness lightheadedness coughing BP is low 88/62 HR in 120's irreguilar and look sick and pale pt states ah snot urinating denies abd or pelvic pain   Review of Systems   Constitutional:  Positive for fatigue. Negative for unexpected weight change.   Respiratory:  Positive for cough and shortness of breath.    Cardiovascular:  Negative for chest pain.   Gastrointestinal:  Negative for abdominal pain.   Musculoskeletal:  Positive for arthralgias.   Psychiatric/Behavioral:  Positive for dysphoric mood.        Objective:      Physical Exam  Vitals and nursing note reviewed.   Constitutional:       Appearance: He is well-developed.      Comments: Pale dizziness   HENT:      Head: Normocephalic and atraumatic.      Right Ear: External ear normal.      Left Ear: External ear normal.      Nose: Nose normal.   Eyes:      Extraocular Movements: Extraocular movements intact.      Conjunctiva/sclera: Conjunctivae normal.      Pupils: Pupils are equal, round, and reactive to light.   Neck:      Thyroid: No thyromegaly.   Cardiovascular:      Rate and Rhythm: Tachycardia present. Rhythm irregular.      Heart sounds: Normal heart sounds. No murmur heard.     No friction rub. No gallop.   Pulmonary:      Effort: Pulmonary effort is normal. No respiratory distress.      Breath sounds: Normal breath sounds. No wheezing.   Abdominal:      General: Bowel sounds are normal. There is no distension.      Palpations: Abdomen is soft.      Tenderness: There is no abdominal tenderness.   Musculoskeletal:         General: No tenderness or deformity. Normal range of motion.      Cervical  back: Normal range of motion and neck supple.   Lymphadenopathy:      Cervical: No cervical adenopathy.   Skin:     General: Skin is dry.      Findings: No erythema or rash.   Neurological:      Mental Status: He is alert and oriented to person, place, and time.   Psychiatric:         Mood and Affect: Mood normal.         Thought Content: Thought content normal.         Judgment: Judgment normal.      Comments: Sad mood from chronic illness         Assessment:       1. Atrial fibrillation with RVR    2. Paleness    3. Oliguria    4. CKD (chronic kidney disease), stage IV    5. Hypotension due to hypovolemia    6. Chronic deep vein thrombosis (DVT) of femoral vein of left lower extremity        Plan:       Atrial fibrillation with RVR  Comments:  sent to ER for further testings and tx    Paleness  Comments:  r/o GI bleeding on eliquis    Oliguria  Comments:  dehydration vs symptomatic BPH    CKD (chronic kidney disease), stage IV    Hypotension due to hypovolemia  Comments:  send pt to ER for further evaluation and IVF    Chronic deep vein thrombosis (DVT) of femoral vein of left lower extremity  Comments:  on eliquis        Medication List with Changes/Refills   Current Medications    ACCU-CHEK PAUL CONTROL SOLN SOLN    1 drop by NOT APPLICABLE route once daily.    ACCU-CHEK GUIDE TEST STRIPS STRP    TEST BLOOD SUGAR THREE TIMES DAILY    ACCU-CHEK SOFTCLIX LANCETS MISC    TEST BLOOD SUGAR THREE TIMES DAILY    ALLOPURINOL (ZYLOPRIM) 300 MG TABLET    Take by mouth.    APIXABAN (ELIQUIS) 5 MG TAB    Take 1 tablet (5 mg total) by mouth 2 (two) times daily.    ASPIRIN (ECOTRIN) 81 MG EC TABLET    Take 1 tablet (81 mg total) by mouth once daily.    BLOOD-GLUCOSE METER (ACCU-CHEK GUIDE GLUCOSE METER) [facility]    Accucheck BID    BRIMONIDINE 0.2% (ALPHAGAN) 0.2 % DROP    Place 1 drop into both eyes 2 (two) times a day.    CALCIUM-VITAMIN D3 (OS-DAKOTA 500 + D3) 500 MG-5 MCG (200 UNIT) PER TABLET    Take 1 tablet by mouth once  daily.    CARVEDILOL (COREG) 6.25 MG TABLET    Take 6.25 mg by mouth 2 (two) times daily with meals.    COLCHICINE (COLCRYS) 0.6 MG TABLET    1 po qdaily prn gouty arthritis    DROPSAFE ALCOHOL PREP PADS PADM    USE TOPICALLY 5 TIMES DAILY.    FERROUS SULFATE (IRON) 325 MG (65 MG IRON) TAB TABLET    Take 1 tablet (325 mg total) by mouth every other day.    FUROSEMIDE (LASIX) 40 MG TABLET    Take 1 tablet (40 mg total) by mouth once daily.    GLIMEPIRIDE (AMARYL) 4 MG TABLET    TAKE 1 TABLET TWICE DAILY BEFORE MEALS    HYDRALAZINE (APRESOLINE) 10 MG TABLET    Take 1 tablet (10 mg total) by mouth 3 (three) times daily. Take 2 tablets three times daily as needed for elevated blood pressure    HYDROCODONE-ACETAMINOPHEN (NORCO) 5-325 MG PER TABLET    Take 1 tablet by mouth every 8 (eight) hours as needed for Pain.    METOPROLOL SUCCINATE (TOPROL-XL) 25 MG 24 HR TABLET    Take 1 tablet (25 mg total) by mouth once daily.    MIDODRINE (PROAMATINE) 5 MG TAB    Take 1 tablet (5 mg total) by mouth 2 (two) times daily with meals.    PANTOPRAZOLE (PROTONIX) 40 MG TABLET    TAKE 1 TABLET ONE TIME DAILY    POTASSIUM CHLORIDE SA (K-DUR,KLOR-CON) 20 MEQ TABLET    TAKE 1 TABLET ONE TIME DAILY    PRAVASTATIN (PRAVACHOL) 40 MG TABLET    Take 1 tablet (40 mg total) by mouth once daily.    PREGABALIN (LYRICA) 50 MG CAPSULE    Take 1 capsule (50 mg total) by mouth 2 (two) times daily.    SODIUM BICARBONATE 650 MG TABLET    Take 2 tablets (1,300 mg total) by mouth 2 (two) times daily.    TAMSULOSIN (FLOMAX) 0.4 MG CAP    Take 1 capsule (0.4 mg total) by mouth once daily.

## 2024-09-19 ENCOUNTER — PATIENT MESSAGE (OUTPATIENT)
Dept: PRIMARY CARE CLINIC | Facility: CLINIC | Age: 74
End: 2024-09-19
Payer: MEDICARE

## 2024-09-19 ENCOUNTER — OFFICE VISIT (OUTPATIENT)
Dept: UROLOGY | Facility: CLINIC | Age: 74
End: 2024-09-19
Payer: MEDICARE

## 2024-09-19 VITALS
DIASTOLIC BLOOD PRESSURE: 72 MMHG | HEIGHT: 74 IN | WEIGHT: 183.31 LBS | HEART RATE: 57 BPM | BODY MASS INDEX: 23.53 KG/M2 | SYSTOLIC BLOOD PRESSURE: 114 MMHG

## 2024-09-19 DIAGNOSIS — C61 PROSTATE CANCER: ICD-10-CM

## 2024-09-19 DIAGNOSIS — R39.14 FEELING OF INCOMPLETE BLADDER EMPTYING: Primary | ICD-10-CM

## 2024-09-19 DIAGNOSIS — Z90.79 S/P TURP: ICD-10-CM

## 2024-09-19 LAB
BILIRUB SERPL-MCNC: NORMAL MG/DL
BLOOD URINE, POC: NORMAL
CLARITY, POC UA: CLEAR
COLOR, POC UA: YELLOW
GLUCOSE UR QL STRIP: NORMAL
KETONES UR QL STRIP: NORMAL
LEUKOCYTE ESTERASE URINE, POC: NORMAL
NITRITE, POC UA: NORMAL
PH, POC UA: 5
POC RESIDUAL URINE VOLUME: 0 ML (ref 0–100)
PROTEIN, POC: NORMAL
SPECIFIC GRAVITY, POC UA: 1.01
UROBILINOGEN, POC UA: NORMAL

## 2024-09-19 PROCEDURE — 87086 URINE CULTURE/COLONY COUNT: CPT | Mod: HCNC | Performed by: NURSE PRACTITIONER

## 2024-09-19 PROCEDURE — 1126F AMNT PAIN NOTED NONE PRSNT: CPT | Mod: HCNC,CPTII,S$GLB, | Performed by: NURSE PRACTITIONER

## 2024-09-19 PROCEDURE — 99214 OFFICE O/P EST MOD 30 MIN: CPT | Mod: HCNC,S$GLB,, | Performed by: NURSE PRACTITIONER

## 2024-09-19 PROCEDURE — 87088 URINE BACTERIA CULTURE: CPT | Mod: HCNC | Performed by: NURSE PRACTITIONER

## 2024-09-19 PROCEDURE — 3074F SYST BP LT 130 MM HG: CPT | Mod: HCNC,CPTII,S$GLB, | Performed by: NURSE PRACTITIONER

## 2024-09-19 PROCEDURE — 3060F POS MICROALBUMINURIA REV: CPT | Mod: HCNC,CPTII,S$GLB, | Performed by: NURSE PRACTITIONER

## 2024-09-19 PROCEDURE — 3078F DIAST BP <80 MM HG: CPT | Mod: HCNC,CPTII,S$GLB, | Performed by: NURSE PRACTITIONER

## 2024-09-19 PROCEDURE — 3044F HG A1C LEVEL LT 7.0%: CPT | Mod: HCNC,CPTII,S$GLB, | Performed by: NURSE PRACTITIONER

## 2024-09-19 PROCEDURE — 1160F RVW MEDS BY RX/DR IN RCRD: CPT | Mod: HCNC,CPTII,S$GLB, | Performed by: NURSE PRACTITIONER

## 2024-09-19 PROCEDURE — 99999 PR PBB SHADOW E&M-EST. PATIENT-LVL III: CPT | Mod: PBBFAC,HCNC,, | Performed by: NURSE PRACTITIONER

## 2024-09-19 PROCEDURE — 1101F PT FALLS ASSESS-DOCD LE1/YR: CPT | Mod: HCNC,CPTII,S$GLB, | Performed by: NURSE PRACTITIONER

## 2024-09-19 PROCEDURE — 3288F FALL RISK ASSESSMENT DOCD: CPT | Mod: HCNC,CPTII,S$GLB, | Performed by: NURSE PRACTITIONER

## 2024-09-19 PROCEDURE — 1111F DSCHRG MED/CURRENT MED MERGE: CPT | Mod: HCNC,CPTII,S$GLB, | Performed by: NURSE PRACTITIONER

## 2024-09-19 PROCEDURE — 87186 SC STD MICRODIL/AGAR DIL: CPT | Mod: HCNC | Performed by: NURSE PRACTITIONER

## 2024-09-19 PROCEDURE — 1159F MED LIST DOCD IN RCRD: CPT | Mod: HCNC,CPTII,S$GLB, | Performed by: NURSE PRACTITIONER

## 2024-09-19 PROCEDURE — 3066F NEPHROPATHY DOC TX: CPT | Mod: HCNC,CPTII,S$GLB, | Performed by: NURSE PRACTITIONER

## 2024-09-19 PROCEDURE — 81002 URINALYSIS NONAUTO W/O SCOPE: CPT | Mod: HCNC,S$GLB,, | Performed by: NURSE PRACTITIONER

## 2024-09-19 PROCEDURE — 51798 US URINE CAPACITY MEASURE: CPT | Mod: HCNC,S$GLB,, | Performed by: NURSE PRACTITIONER

## 2024-09-19 RX ORDER — FUROSEMIDE 40 MG/1
40 TABLET ORAL DAILY
Qty: 30 TABLET | Refills: 0 | Status: SHIPPED | OUTPATIENT
Start: 2024-09-19 | End: 2024-10-19

## 2024-09-19 NOTE — PROGRESS NOTES
Subjective:      Jarrett Charlton Jr. is a 74 y.o. male who returns today regarding his UR.    Established patient of Dr. Mota; new to me today. See Urology history below.   Patient added on today for further evaluation of possible UR.   He was evaluated in ED yesterday with c/o WILBUR. Bladder scan not documented. No imaging obtained. UA was negative. Patient was prescribed lasix and instructed to f/u with Urology but he was unable to schedule with Dr. Mota.   Today he reports no issues with urinated other than low volume- states that he is not on fluid restrictions and the decrease volume started at the same time  He denies dysuria, GH, f/c/n/v.   PVR today 0 cc     Last Urology Note:   He had Lupron injection treatment for suspected prostate cancer with rising PSA      a history of elevated PSA, negative TUR biopsy in January. History of APR for crohn's disease.  MRI- 15 ccs. PI-RADS 4, 1 cm lesion, right base PZ. Negative extra prostatic extension,NVB,SV nodes.     Hx of no rectum as well as concerning PIRADS lesion on MRI.   Had TURP for biopsying suspicious area      Date: 01/23/2019  Procedure(s) Performed:   TURP  Findings:   Open bladder neck some regrowth of adenoma and suspicious area in right mid prostate   Right side of prostate resected and sent for pathology  SPECIMEN  1) Right prostate chips.  FINAL PATHOLOGIC DIAGNOSIS  Right prostate gland, transurethral resection:  - Benign prostatic tissue with nodular stromal hyperplasia  - Negative for malignancy     Since TURP, he noted that he can void better with better urine flow.  Blood in urine resolved.  However, he reports Occasional ZEB but it got all better.  No more ZEB reported.  He is here with another MRI of prostate following TURP because of still rising PSA up to 6.3 from 4.1.     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  He was not interested in SUDS.  S/p  colon surgery and his anus is closed.  Therefore dong TRUS bx of prostate is not feasible.  After discussion, we decided to try finasteride to see whether it will lower his PSA.   He has been on finasteride for the past couple of years. Following TURP in 1/2019, we decided to stop taking finasteride.     When he was considered to undergo TURP, we could not do his surgery because he was not able to stop ASA or Plavix due to fresh vascular stent.  He got a clearance that he can hold off plavix and ASA prior to TURP.  Hx of ulcerative colitis, had colon surgery back in 1985 and his anus was closed.  Has a neuropathy on the right leg.  Hx of sciatic nerve on both sides and received injection on the back.  Hx of diabetes diagnosed 2 years ago.  No family hx of prostate cancer.  He is a .  Denies flank pain, dysuira, hematuria .       MRI of prostate 8/20/19  Previous biopsy: Negative on 01/23/2019  PSA: 6.3 ng/mL (08/16/2019)  Prior therapy: TURP  Prostate: 3.6 x 3.5 x 3.0 cm corresponding to a computed volume of 15.2 cc.  Peripheral zone: Focal abnormalities with imaging features concerning for prostate cancer.  Lesion (MARYCARMEN) #1  Location: Side: right; Region: base; Zone: posterior peripheral zone laterally  Greatest dimension: 1.0 cm  T2-WI: T2 SI: circumscribed, homogeneous moderate hypointensity--score 4 or 5  DWI/ADC: DWI: Focal markedly hypointense on ADC and markedly hyperintense on high b-value DWI--score 4 or 5  DCE: Positive  Extraprostatic extension: No gross extraprostatic extension noting postsurgical changes limits evaluation.  PI-RADS assessment category: 4    Transitional zone: Mostly surgically resected.  Neurovascular bundle: Normal.  Seminal vesicles:  SV invasion: None  Adjacent Organ Involvement: No focal bladder wall thickening.  No rectal involvement.  Lymphadenopathy: None     On 11/22/19, pt underwent perineal biopsy by IR for CT guided needle bx for the right posterolateral prostate  "lesion;  Elevated prostate specific antigen (PSA)  Pathology:  BIOPSY OF PROSTATE BED:  FIBROFATTY TISSUE WITH NO NEOPLASIA IDENTIFIED     Pt had his PSA repeated and is now elevated to 7.9.  He was very anxious about possible prostate cancer and would like to to something about it.  So we have started ADT.  He is here with PSA follow up.  Denies any problems. No hot flashes, decreased energy level.  The following portions of the patient's history were reviewed and updated as appropriate: allergies, current medications, past family history, past medical history, past social history, past surgical history and problem list.    Review of Systems  A comprehensive multipoint review of systems was negative except as otherwise stated in the HPI.     Objective:   Vitals: /72 (BP Location: Left arm, Patient Position: Sitting, BP Method: Medium (Automatic))   Pulse (!) 57   Ht 6' 2" (1.88 m)   Wt 83.2 kg (183 lb 5 oz)   BMI 23.54 kg/m²     Physical Exam   General: alert and oriented, no acute distress  Respiratory: Symmetric expansion, non-labored breathing  Cardiovascular: regular rate and rhythm, 1+ LE edema   Skin: normal coloration and turgor, no rashes, no suspicious skin lesions noted  Neuro: no gross deficits  Psych: normal judgment and insight, normal mood/affect, and non-anxious    Lab Review   Urinalysis demonstrates negative for all components  Lab Results   Component Value Date    WBC 4.07 09/18/2024    HGB 10.2 (L) 09/18/2024    HCT 33.3 (L) 09/18/2024    MCV 86 09/18/2024     09/18/2024     Lab Results   Component Value Date    CREATININE 2.4 (H) 09/18/2024    BUN 41 (H) 09/18/2024     Lab Results   Component Value Date    PSA 0.18 11/19/2020    PSADIAG 1.1 09/01/2024              Bladder Scan PVR: 0 cc        Assessment and Plan:   1. Sensation of incomplete bladder emptying   --No evidence of UR/WILBUR  --continue Flomax  --notify office if UR/WILBUR occurs   - Urine culture    2. Prostate " cancer  3. S/P TURP  --f/u with Dr. Mota as directed       Patient requesting refill of lasix until he can see PCP  - furosemide (LASIX) 40 MG tablet; Take 1 tablet (40 mg total) by mouth once daily.  Dispense: 30 tablet; Refill: 0    This note is dictated on M*Modal word recognition program.  There are word recognition mistakes that are occasionally missed on review.

## 2024-09-21 LAB — BACTERIA UR CULT: ABNORMAL

## 2024-09-24 ENCOUNTER — OFFICE VISIT (OUTPATIENT)
Dept: PRIMARY CARE CLINIC | Facility: CLINIC | Age: 74
End: 2024-09-24
Payer: MEDICARE

## 2024-09-24 VITALS
RESPIRATION RATE: 15 BRPM | HEIGHT: 74 IN | DIASTOLIC BLOOD PRESSURE: 70 MMHG | SYSTOLIC BLOOD PRESSURE: 112 MMHG | OXYGEN SATURATION: 99 % | BODY MASS INDEX: 23.36 KG/M2 | HEART RATE: 90 BPM | WEIGHT: 182 LBS

## 2024-09-24 DIAGNOSIS — Z93.3 COLOSTOMY PRESENT: ICD-10-CM

## 2024-09-24 DIAGNOSIS — D64.9 ANEMIA, UNSPECIFIED TYPE: ICD-10-CM

## 2024-09-24 DIAGNOSIS — I12.0 HYPERTENSIVE CHRONIC KIDNEY DISEASE WITH STAGE 5 CHRONIC KIDNEY DISEASE OR END STAGE RENAL DISEASE: ICD-10-CM

## 2024-09-24 DIAGNOSIS — I95.89 CHRONIC HYPOTENSION: ICD-10-CM

## 2024-09-24 DIAGNOSIS — N18.32 STAGE 3B CHRONIC KIDNEY DISEASE: ICD-10-CM

## 2024-09-24 DIAGNOSIS — N40.0 BENIGN PROSTATIC HYPERPLASIA WITHOUT LOWER URINARY TRACT SYMPTOMS: ICD-10-CM

## 2024-09-24 DIAGNOSIS — G62.9 PERIPHERAL POLYNEUROPATHY: ICD-10-CM

## 2024-09-24 DIAGNOSIS — M79.641 RIGHT HAND PAIN: Primary | ICD-10-CM

## 2024-09-24 DIAGNOSIS — G63 POLYNEUROPATHY ASSOCIATED WITH UNDERLYING DISEASE: ICD-10-CM

## 2024-09-24 DIAGNOSIS — E34.0 CARCINOID SYNDROME: ICD-10-CM

## 2024-09-24 DIAGNOSIS — Z90.79 S/P TURP: ICD-10-CM

## 2024-09-24 DIAGNOSIS — I50.9 CONGESTIVE HEART FAILURE, UNSPECIFIED HF CHRONICITY, UNSPECIFIED HEART FAILURE TYPE: ICD-10-CM

## 2024-09-24 DIAGNOSIS — E11.42 TYPE 2 DIABETES MELLITUS WITH DIABETIC POLYNEUROPATHY, WITHOUT LONG-TERM CURRENT USE OF INSULIN: ICD-10-CM

## 2024-09-24 DIAGNOSIS — Z87.19 HISTORY OF ULCERATIVE COLITIS: ICD-10-CM

## 2024-09-24 DIAGNOSIS — E78.00 PURE HYPERCHOLESTEROLEMIA: ICD-10-CM

## 2024-09-24 DIAGNOSIS — I82.412 DEEP VEIN THROMBOSIS (DVT) OF FEMORAL VEIN OF LEFT LOWER EXTREMITY, UNSPECIFIED CHRONICITY: ICD-10-CM

## 2024-09-24 DIAGNOSIS — I48.91 ATRIAL FIBRILLATION, UNSPECIFIED TYPE: ICD-10-CM

## 2024-09-24 DIAGNOSIS — N30.00 ACUTE CYSTITIS WITHOUT HEMATURIA: Primary | ICD-10-CM

## 2024-09-24 PROBLEM — I82.409 DEEP VEIN THROMBOSIS: Status: ACTIVE | Noted: 2024-09-24

## 2024-09-24 LAB — GLUCOSE SERPL-MCNC: 117 MG/DL (ref 70–110)

## 2024-09-24 PROCEDURE — 99999 PR PBB SHADOW E&M-EST. PATIENT-LVL V: CPT | Mod: PBBFAC,HCNC,, | Performed by: FAMILY MEDICINE

## 2024-09-24 RX ORDER — HYDROCODONE BITARTRATE AND ACETAMINOPHEN 5; 325 MG/1; MG/1
1 TABLET ORAL EVERY 8 HOURS PRN
Qty: 30 TABLET | Refills: 0 | Status: SHIPPED | OUTPATIENT
Start: 2024-09-24

## 2024-09-24 RX ORDER — ALLOPURINOL 100 MG/1
100 TABLET ORAL DAILY
Qty: 90 TABLET | Refills: 3 | Status: SHIPPED | OUTPATIENT
Start: 2024-09-24 | End: 2024-09-24

## 2024-09-24 RX ORDER — PREDNISONE 20 MG/1
20 TABLET ORAL DAILY
Qty: 10 TABLET | Refills: 0 | Status: SHIPPED | OUTPATIENT
Start: 2024-09-24 | End: 2024-09-24

## 2024-09-24 RX ORDER — AMPICILLIN 500 MG/1
500 CAPSULE ORAL 4 TIMES DAILY
Qty: 28 CAPSULE | Refills: 0 | Status: SHIPPED | OUTPATIENT
Start: 2024-09-24 | End: 2024-10-01

## 2024-09-24 RX ORDER — HYDROCODONE BITARTRATE AND ACETAMINOPHEN 5; 325 MG/1; MG/1
1 TABLET ORAL EVERY 8 HOURS PRN
Qty: 30 TABLET | Refills: 0 | Status: SHIPPED | OUTPATIENT
Start: 2024-09-24 | End: 2024-09-24

## 2024-09-24 RX ORDER — ALLOPURINOL 100 MG/1
100 TABLET ORAL DAILY
Qty: 90 TABLET | Refills: 3 | Status: SHIPPED | OUTPATIENT
Start: 2024-09-24

## 2024-09-24 RX ORDER — PREDNISONE 20 MG/1
20 TABLET ORAL DAILY
Qty: 10 TABLET | Refills: 0 | Status: SHIPPED | OUTPATIENT
Start: 2024-09-24

## 2024-09-24 RX ORDER — SULFAMETHOXAZOLE AND TRIMETHOPRIM 400; 80 MG/1; MG/1
1 TABLET ORAL 2 TIMES DAILY
Qty: 14 TABLET | Refills: 0 | Status: SHIPPED | OUTPATIENT
Start: 2024-09-24 | End: 2024-09-24

## 2024-09-24 NOTE — PROGRESS NOTES
Subjective:       Patient ID: Jarrett Charlton Jr. is a 74 y.o. male.    Chief Complaint: Follow-up (ED follow up)    HPI:  New 4-year-old white male in for ER follow-up--in emergency room x3 08/27/2024 hypotension/09/10/2020 for congestive heart failure with bilateral peripheral edema/09/18/2024 for congestive heart failure  1st time when had pain in the left inguinal area in left upper leg--ignored it--put in hospital for other stuff.  Patient was discharged in 2 days later had to go back to the emergency room again because pain was so severe was unable to walk--kept over night .Finally 3 rd time told had blood clots left groin 0n eliquis 824 had femoral venous ultrasound showing deep vein thrombosis left external iliac vein, common femoral, and greater saphenous veins--repeat ultrasound  09/10/2024 showed no deep vein thrombosis  Last admission 09/18/2024 lab reviewed patient was anemic hematocrit 33.3 congestive heart failure BNP 1964 hypokalemic with potassium 3.3  Problem right hand x 2 months --no trauma no excessive activity--pain dorsum of the hand especially thenar area to the mid forearm posteriorly--only able to make half of a fist unable to oppose thumb and index finger thumb 5th digit pain with flexion extension inversion eversion  Peripheral edema on furosemide 40 mg for peripheral edema  History congestive heart failure markedly elevated BNP  Hyperlipidemia on Pravachol  History of possible BPH on Flomax  GERD on Protonix  Type 2 diabetes on Amaryl  Gout on Zyloprim has CRI decrease to 100mg   Neuropathy on Lyrica--both lower legs    ROS:  Skin: no psoriasis, eczema, skin cancer--does get some skin cancers on the forearms used to be a --appears to have cellulitis of the lateral aspect of the left foot  HEENT: No headache, ocular pain, blurred vision, diplopia, epistaxis, hoarseness change in voice, thyroid trouble  Lung: No pneumonia, asthma, Tb, wheezing, SOB, no smoking  Heart: No  chest pain, ankle edema, palpitations, MI, kemal murmur, +hypertension, + hyperlipidemia ventricular tachycardia aortic aneurysms CAD with stent no bypass  Abdomen: No nausea, vomiting, diarrhea, constipation, ulcers, hepatitis, gallbladder disease, melena, hematochezia, hematemesis---history cholecystectomy several months ago has ileostomy GERD history of ulcerative colitis  : no UTI, renal disease, stones--chronic renal insufficiency stage IV now stage III does get IV infusions saline twice a day  MS: no fractures, O/A, lupus, rheumatoid, +gout  Neuro: No dizziness, LOC, seizures   + diabetes, no anemia, no anxiety, no depression    wife  3 years ago--1 daughter--retired--lives with 1 granddaughter    Objective:   Physical Exam:  General: Well nourished, well developed, no acute distress overweight   Skin:  Cellulitis of the lateral left foot--starts at the 2nd MTP goes to the toes 2nd through the 4th toes through the lateral aspect of the foot to the ankle over the dorsum of the foot back to the 2nd MTP area is slightly warm touch  HEENT: Eyes PERRLA, EOM intact, nose patent, throat non-erythematous ears TMs clear  NECK: Supple, no bruits, No JVD, no nodes  Lungs: Clear, no rales, rhonchi, wheezing  Heart: Regular rate and rhythm, no murmurs, gallops, or rubs  Abdomen: flat, bowel sounds positive, no tenderness, or organomegaly  MS:  Marked tenderness of the right hand pain with hand grasp only able to do half of a hand grasp difficulty opposing thumb and index and thumb 5th digit pain with flexion extension of the wrist in inversion eversion with swelling mainly of the MCP joints  Neuro: Alert, CN intact, oriented X 3  Extremities: No cyanosis, clubbing, or edema         Assessment:       1. Right hand pain    2. Type 2 diabetes mellitus with diabetic polyneuropathy, without long-term current use of insulin    3. Polyneuropathy associated with underlying disease    4. Colostomy present    5. Chronic  hypotension    6. S/P TURP    7. Stage 3b chronic kidney disease    8. History of ulcerative colitis    9. Benign prostatic hyperplasia without lower urinary tract symptoms    10. Pure hypercholesterolemia    11. Carcinoid syndrome    12. Hypertensive chronic kidney disease with stage 5 chronic kidney disease or end stage renal disease    13. Atrial fibrillation, unspecified type    14. Deep vein thrombosis (DVT) of femoral vein of left lower extremity, unspecified chronicity    15. Congestive heart failure, unspecified HF chronicity, unspecified heart failure type    16. Anemia, unspecified type    17. Peripheral polyneuropathy          Plan:       Right hand pain  -     X-Ray Hand 3 View Right; Future; Expected date: 09/24/2024    Type 2 diabetes mellitus with diabetic polyneuropathy, without long-term current use of insulin  -     POCT Glucose, Hand-Held Device  -     CBC Auto Differential; Future; Expected date: 09/24/2024  -     Comprehensive Metabolic Panel; Future; Expected date: 09/24/2024  -     Hemoglobin A1C; Future; Expected date: 09/24/2024  -     Lipid Panel; Future; Expected date: 09/24/2024  -     BNP; Future; Expected date: 09/24/2024  -     Uric Acid; Future; Expected date: 09/24/2024    Polyneuropathy associated with underlying disease    Colostomy present    Chronic hypotension    S/P TURP    Stage 3b chronic kidney disease    History of ulcerative colitis    Benign prostatic hyperplasia without lower urinary tract symptoms    Pure hypercholesterolemia    Carcinoid syndrome    Hypertensive chronic kidney disease with stage 5 chronic kidney disease or end stage renal disease    Atrial fibrillation, unspecified type    Deep vein thrombosis (DVT) of femoral vein of left lower extremity, unspecified chronicity    Congestive heart failure, unspecified HF chronicity, unspecified heart failure type    Anemia, unspecified type    Peripheral polyneuropathy  -     HYDROcodone-acetaminophen (NORCO) 5-325 mg  per tablet; Take 1 tablet by mouth every 8 (eight) hours as needed for Pain.  Dispense: 30 tablet; Refill: 0    Other orders  -     predniSONE (DELTASONE) 20 MG tablet; Take 1 tablet (20 mg total) by mouth once daily.  Dispense: 10 tablet; Refill: 0  -     allopurinoL (ZYLOPRIM) 100 MG tablet; Take 1 tablet (100 mg total) by mouth once daily.  Dispense: 90 tablet; Refill: 3          Main Reason for Visit ER follow-up  Right hand pain with swelling decreased movement---Ultram 1 p.o. q.6 hours p.r.n. pain--no NSAIDs due to chronic renal insufficiency--prednisone 20 mg 1 p.o. q.d. times 10 days needs to monitor glucose if elevated more than 200 needs to stop prednisone----orthopedic consult x-ray right hand  History gout--due to chronic renal insufficiency decrease Zyloprim from 300 mg to 100 mg Cellulitis left foot--will treat with Keflex 500 mg q.i.d. times 10 days---history of gout in the past will give colchicine 0.6 mg once a day--x-ray the left foot--See Dr Gonzalez one week  Diabetes-on Amaryl in-hospital glucose was normal will do Accu-Chek if okay will treat with prednisone 20 mg if not will not be able to take  History chronic renal insufficiency 3 B   Deep vein thrombosis left external iliac vein common femoral vein and greater saphenous veins on Eliquis  Return 2 weeks see DR Gonzalez CBC CMP Lipid HGbA1C Uric acid BNP  Has appointment with Cardiology soon--has a irregular irregular heartbeat  hx atrial fibrillation

## 2024-09-25 ENCOUNTER — PATIENT MESSAGE (OUTPATIENT)
Dept: DIABETES | Facility: CLINIC | Age: 74
End: 2024-09-25
Payer: MEDICARE

## 2024-09-25 ENCOUNTER — CLINICAL SUPPORT (OUTPATIENT)
Dept: CARDIOLOGY | Facility: HOSPITAL | Age: 74
End: 2024-09-25
Attending: INTERNAL MEDICINE
Payer: MEDICARE

## 2024-09-25 DIAGNOSIS — R55 SYNCOPE AND COLLAPSE: ICD-10-CM

## 2024-09-25 DIAGNOSIS — I47.10 SUPRAVENTRICULAR TACHYCARDIA, UNSPECIFIED: ICD-10-CM

## 2024-09-25 PROCEDURE — 93298 REM INTERROG DEV EVAL SCRMS: CPT | Mod: 26,HCNC,, | Performed by: INTERNAL MEDICINE

## 2024-09-25 PROCEDURE — 93298 REM INTERROG DEV EVAL SCRMS: CPT | Mod: HCNC | Performed by: INTERNAL MEDICINE

## 2024-09-26 NOTE — PROGRESS NOTES
Subjective:     HPI    I had the pleasure of seeing Jarrett Charlton  in follow-up for his history of VT. Last seen in 7/2024. He is a 74M with SBO, s/p colostomy, GERD, parotid mass s/p resection, BPH & prostate CA s/p TURP, CKD 4, pleural plaque, orthostatic hypotension, hypertension, hyperlipidemia, DM2, CAD status post PCI to mid LAD in 2017 (LHC in 2021 showed patent stent, otherwise nonobstructive CAD), who reportedly had VT seen on Holter monitor in 4/2021 and was started on amiodarone around this time. An ILR was implanted in 5/2021, presumably for VT management purposes. Pt reportedly had several episodes of NSVT captured since device implant (longest 36 beats), as well as episodes of AF and SVT.     Echo in 12/2022 showed EF 60%.    At 3/2023 visit pt was on amiodarone 200 mg daily and toprol xl 25 mg daily, in addition to midodrine. Not on DOAC. Device check that day showed stored episodes of AF were in fact most consistent with artifact. Amiodarone stopped at this point.    9/22/2023: [place] ILR alert received for nsVT > 10 beats.  Tachy episode lasting 15 secs, ecg c/w nsVT on 9/03/2023 at 1050 am.      11/7/2023: Atrial fibrillation noted during device remote check: 52 minutes on 10/26/23.  PVCs noted.  No further AF noted past 10/26/23. Pt asymptomatic.      1/31/2024: Report indicated several episodes of probable nsAT/SVT, maximum duration 18 mins 32 secs on 1/28/24; abrupt onset/offsets noted. Was seen in the hospital on 12/7/2023 for LOC at which time metoprolol was discontinued. Attempt to restart it was unsuccessful (hypotension).     At 3/2024 visit pt stated he has episodes of extreme SOB, LH, fatigue. BPs have been very labile. He is on midodrine. Just took one before this visit today (BP is now very high). Says if he doesn't take it he could become so hypotensive he will faint. Has had multiple syncopal episodes. No palps, CP.     On 4/30/2024 pt underwent EPS. An AT was induced, mapped to  6 o'clock on the tricuspid valve annulus, and successfully ablated.    Pt has had several ED visits/hospitalizations since his ablation, for dehydration, acute neck pain, and hand swelling. ECGs have shown sinus with frequent PACs. Device check in 6/2024 showed an episode of nonsustained AT -340 ms, lasting 6 seconds. Otherwise no events.    At 7/2024 visit pt stated his dizzy spells had subsided since ablation.    Pt [place] to [facility] [place] in [place]24 with swelling and body aches, and found to be in atrial flutter vs coarse AF. US revealed DVT in left external iliac vein, common femoral vein, and greater saphenous vein. VQ scan with low probability of PE. Discharged in sinus rhythm on eliquis.    I reviewed an 8/2024 device check which showed a 23 hour episode of AF on 8/11/2024. Pt feeling dizzy, which he attributes to his antihypertensives which includes lasix 40 mg daily. He did have leg and hand swelling but these have improved since starting eliquis.    My interpretation of today's ECG is NSR at 69 bpm with PACs and RBBB, 107 bpm.    Review of Systems   Constitutional: Negative for decreased appetite, malaise/fatigue, weight gain and weight loss.   HENT:  Negative for sore throat.    Eyes:  Negative for blurred vision.   Cardiovascular:  Negative for chest pain, dyspnea on exertion, irregular heartbeat, leg swelling, near-syncope, orthopnea, palpitations, paroxysmal nocturnal dyspnea and syncope.   Respiratory:  Negative for shortness of breath.    Skin:  Negative for rash.   Musculoskeletal:  Negative for arthritis.   Gastrointestinal:  Negative for abdominal pain.   Neurological:  Negative for focal weakness.   Psychiatric/Behavioral:  Negative for altered mental status.         Objective:    Physical Exam  Vitals and nursing note reviewed.   Constitutional:       General: He is not in acute distress.     Appearance: He is well-developed.   HENT:      Head: Normocephalic and atraumatic.   Eyes:       General: No scleral icterus.     Pupils: Pupils are equal, round, and reactive to light.   Neck:      Thyroid: No thyromegaly.   Cardiovascular:      Rate and Rhythm: Regular rhythm.      Pulses: Normal pulses.      Heart sounds: Normal heart sounds. No murmur heard.     No friction rub. No gallop.   Pulmonary:      Effort: Pulmonary effort is normal.      Breath sounds: Normal breath sounds.   Abdominal:      General: Bowel sounds are normal. There is no distension.      Palpations: Abdomen is soft.      Tenderness: There is no abdominal tenderness.   Musculoskeletal:      Cervical back: Neck supple.   Skin:     General: Skin is warm and dry.      Findings: No rash.   Neurological:      Mental Status: He is alert and oriented to person, place, and time.   Psychiatric:         Behavior: Behavior normal.           Assessment:       1. Paroxysmal ventricular tachycardia    2. Essential hypertension    3. Mixed hyperlipidemia    4. Atrial fibrillation, unspecified type         Plan:       In summary, Jarrett Charlton Jr. is a 74M with a reported history of VT, AF, and AT. Intolerant to beta blockers. Now 5 months status-post AT ablation--recent admission to [facility] with AFL vs AF in the setting of DVT. Now on eliquis.    Plan is to stop lasix as pt is becoming hypotensive after taking his AM meds (leg and hand swelling improved), continue eliquis for LE DVT and AF, follow-up 6 months.    Thank you for allowing me to participate in the care of this patient. Please do not hesitate to call me with any questions or concerns.

## 2024-09-30 ENCOUNTER — HOSPITAL ENCOUNTER (OUTPATIENT)
Dept: CARDIOLOGY | Facility: CLINIC | Age: 74
Discharge: HOME | End: 2024-09-30
Payer: MEDICARE

## 2024-09-30 ENCOUNTER — OUTPATIENT CASE MANAGEMENT (OUTPATIENT)
Dept: ADMINISTRATIVE | Facility: OTHER | Age: 74
End: 2024-09-30
Payer: MEDICARE

## 2024-09-30 ENCOUNTER — OFFICE VISIT (OUTPATIENT)
Dept: ELECTROPHYSIOLOGY | Facility: CLINIC | Age: 74
End: 2024-09-30
Payer: MEDICARE

## 2024-09-30 VITALS
DIASTOLIC BLOOD PRESSURE: 63 MMHG | BODY MASS INDEX: 23.65 KG/M2 | SYSTOLIC BLOOD PRESSURE: 92 MMHG | HEART RATE: 107 BPM | WEIGHT: 184.31 LBS | HEIGHT: 74 IN

## 2024-09-30 DIAGNOSIS — I47.29 PAROXYSMAL VENTRICULAR TACHYCARDIA: ICD-10-CM

## 2024-09-30 DIAGNOSIS — I47.29 PAROXYSMAL VENTRICULAR TACHYCARDIA: Primary | ICD-10-CM

## 2024-09-30 DIAGNOSIS — I10 ESSENTIAL HYPERTENSION: ICD-10-CM

## 2024-09-30 DIAGNOSIS — E87.6 HYPOKALEMIA: ICD-10-CM

## 2024-09-30 DIAGNOSIS — I48.91 ATRIAL FIBRILLATION, UNSPECIFIED TYPE: ICD-10-CM

## 2024-09-30 DIAGNOSIS — E78.2 MIXED HYPERLIPIDEMIA: ICD-10-CM

## 2024-09-30 LAB
OHS QRS DURATION: 120 MS
OHS QTC CALCULATION: 525 MS

## 2024-09-30 PROCEDURE — 3074F SYST BP LT 130 MM HG: CPT | Mod: HCNC,CPTII,S$GLB, | Performed by: INTERNAL MEDICINE

## 2024-09-30 PROCEDURE — 3078F DIAST BP <80 MM HG: CPT | Mod: HCNC,CPTII,S$GLB, | Performed by: INTERNAL MEDICINE

## 2024-09-30 PROCEDURE — 1101F PT FALLS ASSESS-DOCD LE1/YR: CPT | Mod: HCNC,CPTII,S$GLB, | Performed by: INTERNAL MEDICINE

## 2024-09-30 PROCEDURE — 3288F FALL RISK ASSESSMENT DOCD: CPT | Mod: HCNC,CPTII,S$GLB, | Performed by: INTERNAL MEDICINE

## 2024-09-30 PROCEDURE — 93010 ELECTROCARDIOGRAM REPORT: CPT | Mod: HCNC,S$GLB,, | Performed by: INTERNAL MEDICINE

## 2024-09-30 PROCEDURE — 1111F DSCHRG MED/CURRENT MED MERGE: CPT | Mod: HCNC,CPTII,S$GLB, | Performed by: INTERNAL MEDICINE

## 2024-09-30 PROCEDURE — 99999 PR PBB SHADOW E&M-EST. PATIENT-LVL II: CPT | Mod: PBBFAC,HCNC,, | Performed by: INTERNAL MEDICINE

## 2024-09-30 PROCEDURE — 1159F MED LIST DOCD IN RCRD: CPT | Mod: HCNC,CPTII,S$GLB, | Performed by: INTERNAL MEDICINE

## 2024-09-30 PROCEDURE — 3060F POS MICROALBUMINURIA REV: CPT | Mod: HCNC,CPTII,S$GLB, | Performed by: INTERNAL MEDICINE

## 2024-09-30 PROCEDURE — 3044F HG A1C LEVEL LT 7.0%: CPT | Mod: HCNC,CPTII,S$GLB, | Performed by: INTERNAL MEDICINE

## 2024-09-30 PROCEDURE — 93005 ELECTROCARDIOGRAM TRACING: CPT | Mod: HCNC,S$GLB,, | Performed by: INTERNAL MEDICINE

## 2024-09-30 PROCEDURE — 3066F NEPHROPATHY DOC TX: CPT | Mod: HCNC,CPTII,S$GLB, | Performed by: INTERNAL MEDICINE

## 2024-09-30 PROCEDURE — 1126F AMNT PAIN NOTED NONE PRSNT: CPT | Mod: HCNC,CPTII,S$GLB, | Performed by: INTERNAL MEDICINE

## 2024-09-30 PROCEDURE — 1160F RVW MEDS BY RX/DR IN RCRD: CPT | Mod: HCNC,CPTII,S$GLB, | Performed by: INTERNAL MEDICINE

## 2024-09-30 PROCEDURE — 99214 OFFICE O/P EST MOD 30 MIN: CPT | Mod: HCNC,S$GLB,, | Performed by: INTERNAL MEDICINE

## 2024-09-30 RX ORDER — LANOLIN ALCOHOL/MO/W.PET/CERES
400 CREAM (GRAM) TOPICAL DAILY
COMMUNITY

## 2024-09-30 RX ORDER — POTASSIUM CHLORIDE 20 MEQ/1
20 TABLET, EXTENDED RELEASE ORAL
Qty: 90 TABLET | Refills: 3 | Status: SHIPPED | OUTPATIENT
Start: 2024-09-30

## 2024-09-30 RX ORDER — GABAPENTIN 600 MG/1
600 TABLET ORAL 3 TIMES DAILY
COMMUNITY

## 2024-09-30 NOTE — TELEPHONE ENCOUNTER
Refill Routing Note   Medication(s) are not appropriate for processing by [facility] for the following reason(s):        New or recently adjusted medication  ED/Hospital Visit since last OV with provider  Required labs abnormal    [facility] action(s):  Defer        Medication Therapy Plan: New start (8/14/24)      Appointments  past 12m or future 3m with PCP    Date Provider   Last Visit   9/18/2024 Poli Gonzalez MD   Next Visit   10/18/2024 Poli Gonzalez MD   ED visits in past 90 days: 2        Note composed:6:10 PM 09/30/2024

## 2024-09-30 NOTE — TELEPHONE ENCOUNTER
No care due was identified.  [facility] Embedded Care Due Messages. Reference number: [id number].   9/30/2024 12:05:39 PM CDT

## 2024-09-30 NOTE — TELEPHONE ENCOUNTER
----- Message from Nannette sent at 9/30/2024  1:55 PM CDT -----  Contact: [phone]  Patient called, requested a courtesy call from Dr Gonzalez's office in regards the a medication that he was told to stop taking. Would like a call back.

## 2024-10-01 ENCOUNTER — OFFICE VISIT (OUTPATIENT)
Dept: CARDIOLOGY | Facility: CLINIC | Age: 74
End: 2024-10-01
Payer: MEDICARE

## 2024-10-01 VITALS
SYSTOLIC BLOOD PRESSURE: 124 MMHG | BODY MASS INDEX: 23.6 KG/M2 | HEIGHT: 74 IN | HEART RATE: 97 BPM | DIASTOLIC BLOOD PRESSURE: 72 MMHG | WEIGHT: 183.88 LBS | OXYGEN SATURATION: 99 %

## 2024-10-01 DIAGNOSIS — E78.2 MIXED HYPERLIPIDEMIA: ICD-10-CM

## 2024-10-01 DIAGNOSIS — I71.21 ASCENDING AORTIC ANEURYSM, UNSPECIFIED WHETHER RUPTURED: ICD-10-CM

## 2024-10-01 DIAGNOSIS — I95.89 CHRONIC HYPOTENSION: ICD-10-CM

## 2024-10-01 DIAGNOSIS — R60.0 BILATERAL LEG EDEMA: ICD-10-CM

## 2024-10-01 DIAGNOSIS — I50.20 HFREF (HEART FAILURE WITH REDUCED EJECTION FRACTION): Primary | ICD-10-CM

## 2024-10-01 DIAGNOSIS — I48.11 LONGSTANDING PERSISTENT ATRIAL FIBRILLATION: ICD-10-CM

## 2024-10-01 DIAGNOSIS — I25.10 CORONARY ARTERY DISEASE INVOLVING NATIVE CORONARY ARTERY OF NATIVE HEART WITHOUT ANGINA PECTORIS: ICD-10-CM

## 2024-10-01 DIAGNOSIS — Z95.828 PRESENCE OF ARTERIAL STENT: ICD-10-CM

## 2024-10-01 DIAGNOSIS — I70.0 AORTIC ATHEROSCLEROSIS: ICD-10-CM

## 2024-10-01 DIAGNOSIS — R00.0 TACHYCARDIA: ICD-10-CM

## 2024-10-01 DIAGNOSIS — I10 ESSENTIAL HYPERTENSION: ICD-10-CM

## 2024-10-01 PROCEDURE — 1111F DSCHRG MED/CURRENT MED MERGE: CPT | Mod: HCNC,CPTII,S$GLB,

## 2024-10-01 PROCEDURE — 3044F HG A1C LEVEL LT 7.0%: CPT | Mod: HCNC,CPTII,S$GLB,

## 2024-10-01 PROCEDURE — 3066F NEPHROPATHY DOC TX: CPT | Mod: HCNC,CPTII,S$GLB,

## 2024-10-01 PROCEDURE — 3288F FALL RISK ASSESSMENT DOCD: CPT | Mod: HCNC,CPTII,S$GLB,

## 2024-10-01 PROCEDURE — 3078F DIAST BP <80 MM HG: CPT | Mod: HCNC,CPTII,S$GLB,

## 2024-10-01 PROCEDURE — 1159F MED LIST DOCD IN RCRD: CPT | Mod: HCNC,CPTII,S$GLB,

## 2024-10-01 PROCEDURE — 1100F PTFALLS ASSESS-DOCD GE2>/YR: CPT | Mod: HCNC,CPTII,S$GLB,

## 2024-10-01 PROCEDURE — 99215 OFFICE O/P EST HI 40 MIN: CPT | Mod: HCNC,S$GLB,,

## 2024-10-01 PROCEDURE — 3074F SYST BP LT 130 MM HG: CPT | Mod: HCNC,CPTII,S$GLB,

## 2024-10-01 PROCEDURE — 99999 PR PBB SHADOW E&M-EST. PATIENT-LVL IV: CPT | Mod: PBBFAC,HCNC,,

## 2024-10-01 PROCEDURE — 1160F RVW MEDS BY RX/DR IN RCRD: CPT | Mod: HCNC,CPTII,S$GLB,

## 2024-10-01 PROCEDURE — G2211 COMPLEX E/M VISIT ADD ON: HCPCS | Mod: HCNC,S$GLB,,

## 2024-10-01 PROCEDURE — 1126F AMNT PAIN NOTED NONE PRSNT: CPT | Mod: HCNC,CPTII,S$GLB,

## 2024-10-01 PROCEDURE — 3060F POS MICROALBUMINURIA REV: CPT | Mod: HCNC,CPTII,S$GLB,

## 2024-10-01 RX ORDER — METOPROLOL SUCCINATE 25 MG/1
25 TABLET, EXTENDED RELEASE ORAL DAILY
Qty: 90 TABLET | Refills: 3 | Status: SHIPPED | OUTPATIENT
Start: 2024-10-01 | End: 2025-10-01

## 2024-10-01 RX ORDER — FUROSEMIDE 20 MG/1
20 TABLET ORAL
Qty: 60 TABLET | Refills: 11 | Status: SHIPPED | OUTPATIENT
Start: 2024-10-01 | End: 2025-10-01

## 2024-10-01 NOTE — PROGRESS NOTES
"[facility] [address]  Cardiology Clinic Note      Chief Complaint  Chief Complaint   Patient presents with    Dizziness    Foot Swelling     Right and left       HPI:  Mr. Charlton is a 74-year-old male with a past medical history SBO, s/p colostomy, GERD, parotid mass s/p resection, BPH & prostate CA s/p TURP, CKD 4, pleural plaque, ("neurogenic") orthostatic hypotension, hypertension, hyperlipidemia, DM2, carotid stenosis not hemodynamically significant ultrasound 03/2023, ascending thoracic aorta 4.4 cm CT 2018 then 4.7 cm in 2023 and 4.6 cm 09/2024, lower extremity arterial duplex 09/2022 no evidence of hemodynamically significant stenosis nonvisualization of the peroneal arteries prior SAKSHI 2019 normal, DVT 08/2024, CAD status post PCI to mid LAD 2017 followed by cardiac catheterization 2021 patent stent nonobstructive disease prior MPI 2018 inferolateral ischemia, sustained ventricular tachycardia documented on discharge summary 4/2021 on amiodarone previous loop recorder implantation 2021 presumed link transmission showed several episodes of ventricular tachycardia with longest episode 36 beats also documented either atrial fibrillation RVR with aberrant conduction or AV radha reentry tachycardia (this was documented by an outside provider note scan 03/2021) then EP visit notes VT/AF/AT (Atrial fibrillation noted during device remote check: 52 minutes on 10/26/23 per EP note) status post atrial tachycardia ablation 04/2024, echocardiogram 08/2024 EF estimated 35% grade 2 diastolic dysfunction biatrial dilation aortic valve sclerosis moderate mitral valve regurgitation mild-to-moderate tricuspid valve regurgitation PASP 79 CVP 8 small effusion prior echocardiogram 07/2024 EF estimated 60-65% grade 2 diastolic dysfunction trace aortic valve regurgitation mild-to-moderate mitral valve regurgitation normal RV mild mitral valve regurgitation PASP 64 prior echo 09/2023 normal EF grade 1 diastolic " dysfunction mild LVH normal RV prior echo 12/2022 normal EF mild LVH diastolic function indeterminate normal RV PASP 32+ CVP which could not be determined ascending aorta mildly dilated     Patient is new to me  Previously seen by several cardiologist and EP providers  Complains of persistent dizziness despite taking midodrine to increase BP  Reports dizziness occurs with changes in position  States he is taking Coreg and Lasix despite being instructed by EP provider to stop due to hypotension  Patient states that Lasix is the only treatment that has helped with bilateral lower extremity edema  Reports easily forgetful and is confused about treatment plan and what medications he should be taking  Also reports LE edema and that he wears compression stockings and elevates leg while sitting/lying  Denies chest pain, SOB, or difficulty  Denies palpitations, lightheadedness, syncope or near-syncope  Denies cough, orthopnea, or PND  Denies fever, chills, NVD  Denies recent travels or close contact with sick individuals  Reports he is independent and as active as tolerated given dizziness and blurry vision  Denies any falls or injuries  Reports easy bruising   Denies hematochezia or hemoptysis  Lives alone; spouse recently passed away  Has a daughter who works for [employer] and she offers a great deal of support with care    Medications  Current Outpatient Medications   Medication Sig Dispense Refill    ACCU-CHEK PAUL CONTROL SOLN Soln 1 drop by NOT APPLICABLE route once daily.      ACCU-CHEK GUIDE TEST STRIPS Strp TEST BLOOD SUGAR THREE TIMES DAILY 300 strip 10    ACCU-CHEK SOFTCLIX LANCETS Misc TEST BLOOD SUGAR THREE TIMES DAILY 300 each 3    allopurinoL (ZYLOPRIM) 100 MG tablet Take 1 tablet (100 mg total) by mouth once daily. 90 tablet 3    ampicillin (PRINCIPEN) 500 MG capsule Take 1 capsule (500 mg total) by mouth 4 (four) times daily. for 7 days 28 capsule 0    apixaban (ELIQUIS) 5 mg Tab Take 1 tablet (5 mg total) by  mouth 2 (two) times daily. 60 tablet 2    aspirin (ECOTRIN) 81 MG EC tablet Take 1 tablet (81 mg total) by mouth once daily. 30 tablet 11    blood-glucose meter (ACCU-CHEK GUIDE GLUCOSE METER) [facility] [place] Accucheck BID 1 each 0    brimonidine 0.2% (ALPHAGAN) 0.2 % Drop Place 1 drop into both eyes 2 (two) times a day. 10 mL 5    calcium-vitamin D3 (OS-DAKOTA 500 + D3) 500 mg-5 mcg (200 unit) per tablet Take 1 tablet by mouth once daily. 30 tablet 1    colchicine (COLCRYS) 0.6 mg tablet 1 po qdaily prn gouty arthritis 90 tablet 0    DROPSAFE ALCOHOL PREP PADS PadM USE TOPICALLY 5 TIMES DAILY. 500 each 3    ferrous sulfate (IRON) 325 mg (65 mg iron) Tab tablet Take 1 tablet (325 mg total) by mouth every other day. 15 tablet 11    gabapentin (NEURONTIN) 600 MG tablet Take 600 mg by mouth 3 (three) times daily. Pt states taking half a tablet twice a day      glimepiride (AMARYL) 4 MG tablet TAKE 1 TABLET TWICE DAILY BEFORE MEALS 180 tablet 0    hydrALAZINE (APRESOLINE) 10 MG tablet Take 1 tablet (10 mg total) by mouth 3 (three) times daily. Take 2 tablets three times daily as needed for elevated blood pressure 270 tablet 3    magnesium oxide (MAG-OX) 400 mg (241.3 mg magnesium) tablet Take 400 mg by mouth once daily.      midodrine (PROAMATINE) 5 MG Tab Take 1 tablet (5 mg total) by mouth 2 (two) times daily with meals. 60 tablet 11    pantoprazole (PROTONIX) 40 MG tablet TAKE 1 TABLET ONE TIME DAILY 90 tablet 3    potassium chloride SA (K-DUR,KLOR-CON) 20 MEQ tablet TAKE 1 TABLET EVERY DAY 90 tablet 3    pravastatin (PRAVACHOL) 40 MG tablet Take 1 tablet (40 mg total) by mouth once daily. (Patient taking differently: Take 40 mg by mouth every evening.) 90 tablet 3    predniSONE (DELTASONE) 20 MG tablet Take 1 tablet (20 mg total) by mouth once daily. 10 tablet 0    pregabalin (LYRICA) 50 MG capsule Take 1 capsule (50 mg total) by mouth 2 (two) times daily. 60 capsule 1    sodium bicarbonate 650 MG tablet Take 2 tablets (1,300 mg  total) by mouth 2 (two) times daily. 360 tablet 3    tamsulosin (FLOMAX) 0.4 mg Cap Take 1 capsule (0.4 mg total) by mouth once daily. 30 capsule 11    furosemide (LASIX) 20 MG tablet Take 1 tablet (20 mg total) by mouth as needed (For lower extremity swelling; hold if SBP <100). 60 tablet 11    HYDROcodone-acetaminophen (NORCO) 5-325 mg per tablet Take 1 tablet by mouth every 8 (eight) hours as needed for Pain. (Patient not taking: Reported on 10/1/2024) 30 tablet 0    metoprolol succinate (TOPROL-XL) 25 MG 24 hr tablet Take 1 tablet (25 mg total) by mouth once daily. 90 tablet 3     Current Facility-Administered Medications   Medication Dose Route Frequency Provider Last Rate Last Admin    leuprolide acetate (6 month) injection 45 mg  45 mg Intramuscular Q6 Months    45 mg at 05/14/24 0941     Facility-Administered Medications Ordered in Other Visits   Medication Dose Route Frequency Provider Last Rate Last Admin    sodium chloride 0.9% in 1,000 mL infusion   Intravenous Continuous Order, Paper            History  Past Medical History:   Diagnosis Date    Basal cell carcinoma     BPH (benign prostatic hyperplasia)     s/p TURP    Carotid stenosis     Chronic kidney disease     Claudication     Coronary artery disease     DDD (degenerative disc disease) 10/21/2013    Diabetes mellitus with renal complications     Disc disease, degenerative, cervical     Encounter for blood transfusion     GERD (gastroesophageal reflux disease)     Gout, chronic     History of ulcerative colitis     s/p colectomy and ileostomy    HLD (hyperlipidemia)     HTN (hypertension)     Ileostomy in place 1982    RBBB     Squamous cell carcinoma 03/08/2018    Left superior helix near insertion    Squamous cell carcinoma 04/12/2018    Left forearm x 5    Syncope 07/06/2024    Ventricular tachycardia      Past Surgical History:   Procedure Laterality Date    ABLATION, SVT, ACCESSORY PATHWAY N/A 4/30/2024    Procedure: Ablation, SVT, Accessory  Pathway;  Surgeon: Franky Taylor MD;  Location: [facility] EP LAB;  Service: Cardiology;  Laterality: N/A;  VT, RFA, Carto, MAC, GP, 3 Prep *MDT ILR in situ*    cardiac stents      CATARACT EXTRACTION Bilateral     CERVICAL FUSION      CHOLECYSTECTOMY N/A 2/14/2023    Procedure: CHOLECYSTECTOMY;  Surgeon: Mike Joyce MD;  Location: [facility];  Service: General;  Laterality: N/A;  very high probabilty of converison to open    colectomy and ileostomy  1985    ENDOSCOPIC ULTRASOUND OF UPPER GASTROINTESTINAL TRACT N/A 2/10/2023    Procedure: ULTRASOUND, UPPER GI TRACT, ENDOSCOPIC;  Surgeon: Poli Fuller MD;  Location: [facility] ENDO;  Service: Endoscopy;  Laterality: N/A;    ERCP N/A 2/10/2023    Procedure: ERCP (ENDOSCOPIC RETROGRADE CHOLANGIOPANCREATOGRAPHY);  Surgeon: Poli Fuller MD;  Location: [facility] ENDO;  Service: Endoscopy;  Laterality: N/A;    EXCISION OF PAROTID GLAND Left 12/18/2020    Procedure: EXCISION, PAROTID GLAND;  Surgeon: Michael Pinzon MD;  Location: [facility] 2ND FLR;  Service: ENT;  Laterality: Left;    INSERTION OF IMPLANTABLE LOOP RECORDER  06/07/2021    INSERTION OF IMPLANTABLE LOOP RECORDER Left 6/7/2021    Procedure: INSERTION, IMPLANTABLE LOOP RECORDER;  Surgeon: Romario Dao MD;  Location: [facility] CATH LAB;  Service: Cardiology;  Laterality: Left;    LEFT HEART CATHETERIZATION Right 4/15/2021    Procedure: CATHETERIZATION, HEART, LEFT;  Surgeon: Marcio Jones MD;  Location: [facility] CATH LAB;  Service: Cardiology;  Laterality: Right;    LYSIS OF ADHESIONS N/A 11/9/2020    Procedure: LYSIS, ADHESIONS,  ERAS low;  Surgeon: CED Haley MD;  Location: [facility] 2ND FLR;  Service: Colon and Rectal;  Laterality: N/A;    LYSIS OF ADHESIONS N/A 2/14/2023    Procedure: LYSIS, ADHESIONS;  Surgeon: Mike Joyce MD;  Location: [facility];  Service: General;  Laterality: N/A;    POUCHOSCOPY N/A 4/6/2022    Procedure: ENDOSCOPY, POUCH, SMALL INTESTINE, DIAGNOSTIC;  Surgeon: Dieter Juarez,  MD;  Location: [facility] ENDO (2ND FLR);  Service: Endoscopy;  Laterality: N/A;    REPAIR, HERNIA, PARASTOMAL N/A 11/9/2020    Procedure: REPAIR, HERNIA, PARASTOMAL;  Surgeon: CED Haley MD;  Location: [facility] OR 2ND FLR;  Service: Colon and Rectal;  Laterality: N/A;    TRANSURETHRAL RESECTION OF PROSTATE (TURP) WITHOUT USE OF LASER N/A 1/23/2019    Procedure: TURP, WITHOUT USING LASER BIPOLAR;  Surgeon: Catarino Mota MD;  Location: [facility] OR 1ST FLR;  Service: Urology;  Laterality: N/A;  1.5 HOURS    TREATMENT OF CARDIAC ARRHYTHMIA  4/30/2024    Procedure: Cardioversion or Defibrillation;  Surgeon: Franky Taylor MD;  Location: [facility] EP LAB;  Service: Cardiology;;     Social History     Socioeconomic History    Marital status:     Number of children: 1   Occupational History    Occupation:  x 44 years     Comment: Retired    Occupation: [place]    Tobacco Use    Smoking status: Never     Passive exposure: Never    Smokeless tobacco: Never   Substance and Sexual Activity    Alcohol use: No    Drug use: No    Sexual activity: Not Currently     Partners: Female     Social Drivers of Health     Financial Resource Strain: Low Risk  (8/5/2024)    Overall Financial Resource Strain (CARDIA)     Difficulty of Paying Living Expenses: Not very hard   Food Insecurity: No Food Insecurity (8/29/2024)    Hunger Vital Sign     Worried About Running Out of Food in the Last Year: Never true     Ran Out of Food in the Last Year: Never true   Transportation Needs: No Transportation Needs (8/29/2024)    PRAPARE - Transportation     Lack of Transportation (Medical): No     Lack of Transportation (Non-Medical): No   Physical Activity: Inactive (8/29/2024)    Exercise Vital Sign     Days of Exercise per Week: 0 days     Minutes of Exercise per Session: 0 min   Stress: No Stress Concern Present (8/29/2024)    [employer] [place] [employer] - Occupational Stress Questionnaire     Feeling of  Stress : Not at all   Housing Stability: Low Risk  (8/29/2024)    Housing Stability Vital Sign     Unable to Pay for Housing in the Last Year: No     Homeless in the Last Year: No     Family History   Problem Relation Name Age of Onset    Cancer Father      Diabetes Father      Dementia Mother      Melanoma Neg Hx      Hypertension Neg Hx      Arthritis Neg Hx          Allergies  Review of patient's allergies indicates:   Allergen Reactions    Atorvastatin     Rosuvastatin        Review of Systems   Review of Systems   Constitutional: Negative for chills, decreased appetite, diaphoresis, fever, malaise/fatigue, weight gain and weight loss.   Eyes:  Negative for blurred vision.   Cardiovascular:  Positive for leg swelling. Negative for chest pain, claudication, dyspnea on exertion, irregular heartbeat, near-syncope, orthopnea, palpitations, paroxysmal nocturnal dyspnea and syncope.   Respiratory:  Negative for cough, shortness of breath, snoring, sputum production and wheezing.    Endocrine: Negative for cold intolerance, heat intolerance, polydipsia, polyphagia and polyuria.   Skin:  Negative for color change, dry skin, itching, nail changes and poor wound healing.   Musculoskeletal:  Negative for back pain, gout, joint pain and joint swelling.   Gastrointestinal:  Negative for bloating, abdominal pain, constipation, diarrhea, hematemesis, hematochezia, melena, nausea and vomiting.   Genitourinary:  Negative for dysuria and hematuria.   Neurological:  Positive for dizziness. Negative for headaches, light-headedness, numbness, paresthesias and weakness.   Psychiatric/Behavioral:  Negative for altered mental status, depression and memory loss.        Physical Exam  Vitals:    10/01/24 0902   BP: 124/72   Pulse: 97     Wt Readings from Last 1 Encounters:   10/01/24 83.4 kg (183 lb 13.8 oz)     Physical Exam  HENT:      Head: Normocephalic and atraumatic.      Mouth/Throat:      Mouth: Mucous membranes are moist.    Eyes:      Extraocular Movements: Extraocular movements intact.      Pupils: Pupils are equal, round, and reactive to light.   Neck:      Vascular: No carotid bruit.   Cardiovascular:      Heart sounds: No murmur heard.     No friction rub. No gallop.   Pulmonary:      Effort: Pulmonary effort is normal. No respiratory distress.   Abdominal:      General: Abdomen is flat.      Palpations: Abdomen is soft.   Musculoskeletal:      Right lower leg: Edema present.      Left lower leg: Edema present.   Skin:     General: Skin is warm.      Capillary Refill: Capillary refill takes less than 2 seconds.      Findings: Ecchymosis present.   Neurological:      General: No focal deficit present.      Mental Status: He is alert.   Psychiatric:         Mood and Affect: Mood normal.       Labs  Hospital Outpatient Visit on 09/30/2024   Component Date Value Ref Range Status    QRS Duration 09/30/2024 120  ms Final    [facility] QTC Calculation 09/30/2024 525  ms Final   Office Visit on 09/24/2024   Component Date Value Ref Range Status    POC Glucose 09/24/2024 117 (A)  70 - 110 MG/DL Final   Office Visit on 09/19/2024   Component Date Value Ref Range Status    Glucose, UA 09/19/2024 Neg   Final    Bilirubin, POC 09/19/2024 Neg   Final    Ketones, UA 09/19/2024 Neg   Final    Spec Grav UA 09/19/2024 1.015   Final    Blood, UA 09/19/2024 Neg   Final    pH, UA 09/19/2024 5   Final    Protein, POC 09/19/2024 Neg   Final    Urobilinogen, UA 09/19/2024 Normal   Final    Nitrite, UA 09/19/2024 Neg   Final    WBC, UA 09/19/2024 Neg   Final    Color, UA 09/19/2024 Yellow   Final    Clarity, UA 09/19/2024 Clear   Final    POC Residual Urine Volume 09/19/2024 0  0 - 100 mL Final    Urine Culture, Routine 09/19/2024  (A)   Final                    Value:ACINETOBACTER BAUMANNII/HAEMOLYTICUS  >100,000 cfu/ml  No other significant isolate     Admission on 09/18/2024, Discharged on 09/18/2024   Component Date Value Ref Range Status    WBC 09/18/2024  4.07  3.90 - 12.70 K/uL Final    RBC 09/18/2024 3.87 (L)  4.60 - 6.20 M/uL Final    Hemoglobin 09/18/2024 10.2 (L)  14.0 - 18.0 g/dL Final    Hematocrit 09/18/2024 33.3 (L)  40.0 - 54.0 % Final    MCV 09/18/2024 86  82 - 98 fL Final    MCH 09/18/2024 26.4 (L)  27.0 - 31.0 pg Final    MCHC 09/18/2024 30.6 (L)  32.0 - 36.0 g/dL Final    RDW 09/18/2024 16.0 (H)  11.5 - 14.5 % Final    Platelets 09/18/2024 192  150 - 450 K/uL Final    MPV 09/18/2024 11.7  9.2 - 12.9 fL Final    Immature Granulocytes 09/18/2024 0.5  0.0 - 0.5 % Final    Gran # (ANC) 09/18/2024 2.6  1.8 - 7.7 K/uL Final    Immature Grans (Abs) 09/18/2024 0.02  0.00 - 0.04 K/uL Final    Comment: Mild elevation in immature granulocytes is non specific and   can be seen in a variety of conditions including stress response,   acute inflammation, trauma and pregnancy. Correlation with other   laboratory and clinical findings is essential.      Lymph # 09/18/2024 1.1  1.0 - 4.8 K/uL Final    Mono # 09/18/2024 0.3  0.3 - 1.0 K/uL Final    Eos # 09/18/2024 0.0  0.0 - 0.5 K/uL Final    Baso # 09/18/2024 0.02  0.00 - 0.20 K/uL Final    nRBC 09/18/2024 0  0 /100 WBC Final    Gran % 09/18/2024 63.9  38.0 - 73.0 % Final    Lymph % 09/18/2024 28.0  18.0 - 48.0 % Final    Mono % 09/18/2024 6.4  4.0 - 15.0 % Final    Eosinophil % 09/18/2024 0.7  0.0 - 8.0 % Final    Basophil % 09/18/2024 0.5  0.0 - 1.9 % Final    Differential Method 09/18/2024 Automated   Final    Specimen UA 09/18/2024 Urine, Clean Catch   Final    Color, UA 09/18/2024 Yellow  Yellow, Straw, Stacy Final    Appearance, UA 09/18/2024 Clear  Clear Final    pH, UA 09/18/2024 5.0  5.0 - 8.0 Final    Specific Gravity, UA 09/18/2024 1.010  1.005 - 1.030 Final    Protein, UA 09/18/2024 Negative  Negative Final    Comment: Recommend a 24 hour urine protein or a urine   protein/creatinine ratio if globulin induced proteinuria is  clinically suspected.      Glucose, UA 09/18/2024 Negative  Negative Final     Ketones, UA 09/18/2024 Negative  Negative Final    Bilirubin (UA) 09/18/2024 Negative  Negative Final    Occult Blood UA 09/18/2024 Negative  Negative Final    Nitrite, UA 09/18/2024 Negative  Negative Final    Urobilinogen, UA 09/18/2024 Negative  Negative EU/dL Final    Leukocytes, UA 09/18/2024 Negative  Negative Final    POCT Glucose 09/18/2024 105  70 - 110 mg/dL Final    Lipase 09/18/2024 32  4 - 60 U/L Final    Sodium 09/18/2024 138  136 - 145 mmol/L Final    Potassium 09/18/2024 3.2 (L)  3.5 - 5.1 mmol/L Final    Chloride 09/18/2024 96  95 - 110 mmol/L Final    CO2 09/18/2024 28  23 - 29 mmol/L Final    Glucose 09/18/2024 78  70 - 110 mg/dL Final    BUN 09/18/2024 41 (H)  8 - 23 mg/dL Final    Creatinine 09/18/2024 2.4 (H)  0.5 - 1.4 mg/dL Final    Calcium 09/18/2024 8.9  8.7 - 10.5 mg/dL Final    Total Protein 09/18/2024 6.1  6.0 - 8.4 g/dL Final    Albumin 09/18/2024 2.7 (L)  3.5 - 5.2 g/dL Final    Total Bilirubin 09/18/2024 0.9  0.1 - 1.0 mg/dL Final    Comment: For infants and newborns, interpretation of results should be based  on gestational age, weight and in agreement with clinical  observations.    Premature Infant recommended reference ranges:  Up to 24 hours.............<8.0 mg/dL  Up to 48 hours............<12.0 mg/dL  3-5 days..................<15.0 mg/dL  6-29 days.................<15.0 mg/dL      Alkaline Phosphatase 09/18/2024 108  55 - 135 U/L Final    AST 09/18/2024 18  10 - 40 U/L Final    ALT 09/18/2024 13  10 - 44 U/L Final    eGFR 09/18/2024 27.5 (A)  >60 mL/min/1.73 m^2 Final    Anion Gap 09/18/2024 14  8 - 16 mmol/L Final    BNP 09/18/2024 1,964 (H)  0 - 99 pg/mL Final    Values of less than 100 pg/ml are consistent with non-CHF populations.    Troponin I 09/18/2024 0.089 (H)  0.000 - 0.026 ng/mL Final    Comment: The reference interval for Troponin I represents the 99th percentile   cutoff   for our facility and is consistent with 3rd generation assay   performance.     Admission on  09/10/2024, Discharged on 09/12/2024   Component Date Value Ref Range Status    WBC 09/10/2024 6.28  3.90 - 12.70 K/uL Final    RBC 09/10/2024 3.83 (L)  4.60 - 6.20 M/uL Final    Hemoglobin 09/10/2024 10.3 (L)  14.0 - 18.0 g/dL Final    Hematocrit 09/10/2024 33.3 (L)  40.0 - 54.0 % Final    MCV 09/10/2024 87  82 - 98 fL Final    MCH 09/10/2024 26.9 (L)  27.0 - 31.0 pg Final    MCHC 09/10/2024 30.9 (L)  32.0 - 36.0 g/dL Final    RDW 09/10/2024 16.1 (H)  11.5 - 14.5 % Final    Platelets 09/10/2024 170  150 - 450 K/uL Final    MPV 09/10/2024 12.4  9.2 - 12.9 fL Final    Immature Granulocytes 09/10/2024 0.2  0.0 - 0.5 % Final    Gran # (ANC) 09/10/2024 3.5  1.8 - 7.7 K/uL Final    Immature Grans (Abs) 09/10/2024 0.01  0.00 - 0.04 K/uL Final    Comment: Mild elevation in immature granulocytes is non specific and   can be seen in a variety of conditions including stress response,   acute inflammation, trauma and pregnancy. Correlation with other   laboratory and clinical findings is essential.      Lymph # 09/10/2024 2.2  1.0 - 4.8 K/uL Final    Mono # 09/10/2024 0.5  0.3 - 1.0 K/uL Final    Eos # 09/10/2024 0.0  0.0 - 0.5 K/uL Final    Baso # 09/10/2024 0.02  0.00 - 0.20 K/uL Final    nRBC 09/10/2024 0  0 /100 WBC Final    Gran % 09/10/2024 55.6  38.0 - 73.0 % Final    Lymph % 09/10/2024 35.0  18.0 - 48.0 % Final    Mono % 09/10/2024 8.3  4.0 - 15.0 % Final    Eosinophil % 09/10/2024 0.6  0.0 - 8.0 % Final    Basophil % 09/10/2024 0.3  0.0 - 1.9 % Final    Platelet Estimate 09/10/2024 Appears normal   Final    Differential Method 09/10/2024 Automated   Final    Sodium 09/10/2024 134 (L)  136 - 145 mmol/L Final    Potassium 09/10/2024 3.8  3.5 - 5.1 mmol/L Final    Chloride 09/10/2024 95  95 - 110 mmol/L Final    CO2 09/10/2024 27  23 - 29 mmol/L Final    Glucose 09/10/2024 92  70 - 110 mg/dL Final    BUN 09/10/2024 42 (H)  8 - 23 mg/dL Final    Creatinine 09/10/2024 2.6 (H)  0.5 - 1.4 mg/dL Final    Calcium 09/10/2024  9.0  8.7 - 10.5 mg/dL Final    Total Protein 09/10/2024 6.6  6.0 - 8.4 g/dL Final    Albumin 09/10/2024 2.9 (L)  3.5 - 5.2 g/dL Final    Total Bilirubin 09/10/2024 1.5 (H)  0.1 - 1.0 mg/dL Final    Comment: For infants and newborns, interpretation of results should be based  on gestational age, weight and in agreement with clinical  observations.    Premature Infant recommended reference ranges:  Up to 24 hours.............<8.0 mg/dL  Up to 48 hours............<12.0 mg/dL  3-5 days..................<15.0 mg/dL  6-29 days.................<15.0 mg/dL      Alkaline Phosphatase 09/10/2024 127  55 - 135 U/L Final    AST 09/10/2024 20  10 - 40 U/L Final    ALT 09/10/2024 13  10 - 44 U/L Final    eGFR 09/10/2024 24.9 (A)  >60 mL/min/1.73 m^2 Final    Anion Gap 09/10/2024 12  8 - 16 mmol/L Final    Specimen UA 09/10/2024 Urine, Clean Catch   Final    Color, UA 09/10/2024 Colorless (A)  Yellow, Straw, Stacy Final    Appearance, UA 09/10/2024 Clear  Clear Final    pH, UA 09/10/2024 6.0  5.0 - 8.0 Final    Specific Gravity, UA 09/10/2024 <1.005 (A)  1.005 - 1.030 Final    Protein, UA 09/10/2024 Negative  Negative Final    Comment: Recommend a 24 hour urine protein or a urine   protein/creatinine ratio if globulin induced proteinuria is  clinically suspected.      Glucose, UA 09/10/2024 Negative  Negative Final    Ketones, UA 09/10/2024 Negative  Negative Final    Bilirubin (UA) 09/10/2024 Negative  Negative Final    Occult Blood UA 09/10/2024 Negative  Negative Final    Nitrite, UA 09/10/2024 Negative  Negative Final    Urobilinogen, UA 09/10/2024 Negative  Negative EU/dL Final    Leukocytes, UA 09/10/2024 Negative  Negative Final    CPK 09/10/2024 43  20 - 200 U/L Final    BNP 09/10/2024 2,780 (H)  0 - 99 pg/mL Final    Values of less than 100 pg/ml are consistent with non-CHF populations.    Uric Acid 09/10/2024 5.6  3.4 - 7.0 mg/dL Final    POC Rapid COVID 09/10/2024 Negative  Negative Final     Acceptable  09/10/2024 Yes   Final    POC Molecular Influenza A Ag 09/10/2024 Negative  Negative Final    POC Molecular Influenza B Ag 09/10/2024 Negative  Negative Final     Acceptable 09/10/2024 Yes   Final    POCT Glucose 09/10/2024 66 (L)  70 - 110 mg/dL Final    Troponin I 09/10/2024 0.121 (H)  0.000 - 0.026 ng/mL Final    Comment: The reference interval for Troponin I represents the 99th percentile   cutoff   for our facility and is consistent with 3rd generation assay   performance.      POCT Glucose 09/10/2024 141 (H)  70 - 110 mg/dL Final    Troponin I 09/11/2024 0.101 (H)  0.000 - 0.026 ng/mL Final    Comment: The reference interval for Troponin I represents the 99th percentile   cutoff   for our facility and is consistent with 3rd generation assay   performance.      Magnesium 09/11/2024 1.6  1.6 - 2.6 mg/dL Final    Phosphorus 09/11/2024 4.2  2.7 - 4.5 mg/dL Final    Sodium 09/11/2024 137  136 - 145 mmol/L Final    Potassium 09/11/2024 3.1 (L)  3.5 - 5.1 mmol/L Final    Chloride 09/11/2024 97  95 - 110 mmol/L Final    CO2 09/11/2024 27  23 - 29 mmol/L Final    Glucose 09/11/2024 109  70 - 110 mg/dL Final    BUN 09/11/2024 41 (H)  8 - 23 mg/dL Final    Creatinine 09/11/2024 2.3 (H)  0.5 - 1.4 mg/dL Final    Calcium 09/11/2024 8.4 (L)  8.7 - 10.5 mg/dL Final    Anion Gap 09/11/2024 13  8 - 16 mmol/L Final    eGFR 09/11/2024 28.9 (A)  >60 mL/min/1.73 m^2 Final    WBC 09/11/2024 3.86 (L)  3.90 - 12.70 K/uL Final    RBC 09/11/2024 3.64 (L)  4.60 - 6.20 M/uL Final    Hemoglobin 09/11/2024 9.8 (L)  14.0 - 18.0 g/dL Final    Hematocrit 09/11/2024 32.3 (L)  40.0 - 54.0 % Final    MCV 09/11/2024 89  82 - 98 fL Final    MCH 09/11/2024 26.9 (L)  27.0 - 31.0 pg Final    MCHC 09/11/2024 30.3 (L)  32.0 - 36.0 g/dL Final    RDW 09/11/2024 16.0 (H)  11.5 - 14.5 % Final    Platelets 09/11/2024 137 (L)  150 - 450 K/uL Final    MPV 09/11/2024 13.0 (H)  9.2 - 12.9 fL Final    Immature Granulocytes 09/11/2024 0.0  0.0 -  0.5 % Final    Gran # (ANC) 09/11/2024 2.4  1.8 - 7.7 K/uL Final    Immature Grans (Abs) 09/11/2024 0.00  0.00 - 0.04 K/uL Final    Comment: Mild elevation in immature granulocytes is non specific and   can be seen in a variety of conditions including stress response,   acute inflammation, trauma and pregnancy. Correlation with other   laboratory and clinical findings is essential.      Lymph # 09/11/2024 1.0  1.0 - 4.8 K/uL Final    Mono # 09/11/2024 0.4  0.3 - 1.0 K/uL Final    Eos # 09/11/2024 0.0  0.0 - 0.5 K/uL Final    Baso # 09/11/2024 0.01  0.00 - 0.20 K/uL Final    nRBC 09/11/2024 0  0 /100 WBC Final    Gran % 09/11/2024 62.9  38.0 - 73.0 % Final    Lymph % 09/11/2024 26.7  18.0 - 48.0 % Final    Mono % 09/11/2024 9.1  4.0 - 15.0 % Final    Eosinophil % 09/11/2024 1.0  0.0 - 8.0 % Final    Basophil % 09/11/2024 0.3  0.0 - 1.9 % Final    Differential Method 09/11/2024 Automated   Final    POCT Glucose 09/11/2024 157 (H)  70 - 110 mg/dL Final    Sodium 09/12/2024 138  136 - 145 mmol/L Final    Potassium 09/12/2024 3.9  3.5 - 5.1 mmol/L Final    Chloride 09/12/2024 99  95 - 110 mmol/L Final    CO2 09/12/2024 25  23 - 29 mmol/L Final    Glucose 09/12/2024 106  70 - 110 mg/dL Final    BUN 09/12/2024 40 (H)  8 - 23 mg/dL Final    Creatinine 09/12/2024 2.3 (H)  0.5 - 1.4 mg/dL Final    Calcium 09/12/2024 8.6 (L)  8.7 - 10.5 mg/dL Final    Anion Gap 09/12/2024 14  8 - 16 mmol/L Final    eGFR 09/12/2024 28.9 (A)  >60 mL/min/1.73 m^2 Final    POCT Glucose 09/12/2024 122 (H)  70 - 110 mg/dL Final    POCT Glucose 09/12/2024 166 (H)  70 - 110 mg/dL Final   Lab Visit on 09/09/2024   Component Date Value Ref Range Status    WBC 09/09/2024 6.58  3.90 - 12.70 K/uL Final    RBC 09/09/2024 3.87 (L)  4.60 - 6.20 M/uL Final    Hemoglobin 09/09/2024 10.5 (L)  14.0 - 18.0 g/dL Final    Hematocrit 09/09/2024 34.8 (L)  40.0 - 54.0 % Final    MCV 09/09/2024 90  82 - 98 fL Final    MCH 09/09/2024 27.1  27.0 - 31.0 pg Final    MCHC  09/09/2024 30.2 (L)  32.0 - 36.0 g/dL Final    RDW 09/09/2024 16.0 (H)  11.5 - 14.5 % Final    Platelets 09/09/2024 180  150 - 450 K/uL Final    MPV 09/09/2024 13.0 (H)  9.2 - 12.9 fL Final    Immature Granulocytes 09/09/2024 0.3  0.0 - 0.5 % Final    Gran # (ANC) 09/09/2024 4.1  1.8 - 7.7 K/uL Final    Immature Grans (Abs) 09/09/2024 0.02  0.00 - 0.04 K/uL Final    Comment: Mild elevation in immature granulocytes is non specific and   can be seen in a variety of conditions including stress response,   acute inflammation, trauma and pregnancy. Correlation with other   laboratory and clinical findings is essential.      Lymph # 09/09/2024 1.9  1.0 - 4.8 K/uL Final    Mono # 09/09/2024 0.5  0.3 - 1.0 K/uL Final    Eos # 09/09/2024 0.1  0.0 - 0.5 K/uL Final    Baso # 09/09/2024 0.02  0.00 - 0.20 K/uL Final    nRBC 09/09/2024 0  0 /100 WBC Final    Gran % 09/09/2024 61.7  38.0 - 73.0 % Final    Lymph % 09/09/2024 28.6  18.0 - 48.0 % Final    Mono % 09/09/2024 8.2  4.0 - 15.0 % Final    Eosinophil % 09/09/2024 0.9  0.0 - 8.0 % Final    Basophil % 09/09/2024 0.3  0.0 - 1.9 % Final    Differential Method 09/09/2024 Automated   Final    Sodium 09/09/2024 139  136 - 145 mmol/L Final    Potassium 09/09/2024 4.0  3.5 - 5.1 mmol/L Final    Chloride 09/09/2024 100  95 - 110 mmol/L Final    CO2 09/09/2024 25  23 - 29 mmol/L Final    Glucose 09/09/2024 109  70 - 110 mg/dL Final    BUN 09/09/2024 43 (H)  8 - 23 mg/dL Final    Creatinine 09/09/2024 2.3 (H)  0.5 - 1.4 mg/dL Final    Calcium 09/09/2024 8.9  8.7 - 10.5 mg/dL Final    Total Protein 09/09/2024 6.4  6.0 - 8.4 g/dL Final    Albumin 09/09/2024 2.9 (L)  3.5 - 5.2 g/dL Final    Total Bilirubin 09/09/2024 1.1 (H)  0.1 - 1.0 mg/dL Final    Comment: For infants and newborns, interpretation of results should be based  on gestational age, weight and in agreement with clinical  observations.    Premature Infant recommended reference ranges:  Up to 24 hours.............<8.0  mg/dL  Up to 48 hours............<12.0 mg/dL  3-5 days..................<15.0 mg/dL  6-29 days.................<15.0 mg/dL      Alkaline Phosphatase 09/09/2024 124  55 - 135 U/L Final    AST 09/09/2024 19  10 - 40 U/L Final    ALT 09/09/2024 13  10 - 44 U/L Final    eGFR 09/09/2024 28.9 (A)  >60 mL/min/1.73 m^2 Final    Anion Gap 09/09/2024 14  8 - 16 mmol/L Final   Lab Visit on 09/01/2024   Component Date Value Ref Range Status    PSA Diagnostic 09/01/2024 1.1  0.00 - 4.00 ng/mL Final    Comment: The testing method is a chemiluminescent microparticle immunoassay   manufactured by [employer] and performed on the [employer]   or   MIL[employer] system. Values obtained with different assay manufacturers   for   methods may be different and cannot be used interchangeably.  PSA Expected levels:  Hormonal Therapy: <0.05 ng/ml  Prostatectomy: <0.01 ng/ml  Radiation Therapy: <1.00 ng/ml      WBC 09/01/2024 6.12  3.90 - 12.70 K/uL Final    RBC 09/01/2024 4.04 (L)  4.60 - 6.20 M/uL Final    Hemoglobin 09/01/2024 10.9 (L)  14.0 - 18.0 g/dL Final    Hematocrit 09/01/2024 36.4 (L)  40.0 - 54.0 % Final    MCV 09/01/2024 90  82 - 98 fL Final    MCH 09/01/2024 27.0  27.0 - 31.0 pg Final    MCHC 09/01/2024 29.9 (L)  32.0 - 36.0 g/dL Final    RDW 09/01/2024 15.5 (H)  11.5 - 14.5 % Final    Platelets 09/01/2024 262  150 - 450 K/uL Final    MPV 09/01/2024 11.2  9.2 - 12.9 fL Final    Immature Granulocytes 09/01/2024 0.3  0.0 - 0.5 % Final    Gran # (ANC) 09/01/2024 3.5  1.8 - 7.7 K/uL Final    Immature Grans (Abs) 09/01/2024 0.02  0.00 - 0.04 K/uL Final    Comment: Mild elevation in immature granulocytes is non specific and   can be seen in a variety of conditions including stress response,   acute inflammation, trauma and pregnancy. Correlation with other   laboratory and clinical findings is essential.      Lymph # 09/01/2024 2.0  1.0 - 4.8 K/uL Final    Mono # 09/01/2024 0.4  0.3 - 1.0 K/uL Final    Eos # 09/01/2024 0.1  0.0 -  0.5 K/uL Final    Baso # 09/01/2024 0.05  0.00 - 0.20 K/uL Final    nRBC 09/01/2024 0  0 /100 WBC Final    Gran % 09/01/2024 57.2  38.0 - 73.0 % Final    Lymph % 09/01/2024 33.3  18.0 - 48.0 % Final    Mono % 09/01/2024 6.9  4.0 - 15.0 % Final    Eosinophil % 09/01/2024 1.5  0.0 - 8.0 % Final    Basophil % 09/01/2024 0.8  0.0 - 1.9 % Final    Differential Method 09/01/2024 Automated   Final    Sodium 09/01/2024 139  136 - 145 mmol/L Final    Potassium 09/01/2024 4.5  3.5 - 5.1 mmol/L Final    Chloride 09/01/2024 102  95 - 110 mmol/L Final    CO2 09/01/2024 23  23 - 29 mmol/L Final    Glucose 09/01/2024 135 (H)  70 - 110 mg/dL Final    BUN 09/01/2024 53 (H)  8 - 23 mg/dL Final    Creatinine 09/01/2024 2.6 (H)  0.5 - 1.4 mg/dL Final    Calcium 09/01/2024 8.9  8.7 - 10.5 mg/dL Final    Total Protein 09/01/2024 6.7  6.0 - 8.4 g/dL Final    Albumin 09/01/2024 2.8 (L)  3.5 - 5.2 g/dL Final    Total Bilirubin 09/01/2024 0.7  0.1 - 1.0 mg/dL Final    Comment: For infants and newborns, interpretation of results should be based  on gestational age, weight and in agreement with clinical  observations.    Premature Infant recommended reference ranges:  Up to 24 hours.............<8.0 mg/dL  Up to 48 hours............<12.0 mg/dL  3-5 days..................<15.0 mg/dL  6-29 days.................<15.0 mg/dL      Alkaline Phosphatase 09/01/2024 134  55 - 135 U/L Final    AST 09/01/2024 23  10 - 40 U/L Final    ALT 09/01/2024 15  10 - 44 U/L Final    eGFR 09/01/2024 24.9 (A)  >60 mL/min/1.73 m^2 Final    Anion Gap 09/01/2024 14  8 - 16 mmol/L Final    TSH 09/01/2024 2.763  0.400 - 4.000 uIU/mL Final    Free T4 09/01/2024 1.12  0.71 - 1.51 ng/dL Final   Admission on 08/27/2024, Discharged on 08/27/2024   Component Date Value Ref Range Status    WBC 08/27/2024 8.74  3.90 - 12.70 K/uL Final    RBC 08/27/2024 3.62 (L)  4.60 - 6.20 M/uL Final    Hemoglobin 08/27/2024 9.9 (L)  14.0 - 18.0 g/dL Final    Hematocrit 08/27/2024 32.1 (L)  40.0  - 54.0 % Final    MCV 08/27/2024 89  82 - 98 fL Final    MCH 08/27/2024 27.3  27.0 - 31.0 pg Final    MCHC 08/27/2024 30.8 (L)  32.0 - 36.0 g/dL Final    RDW 08/27/2024 15.5 (H)  11.5 - 14.5 % Final    Platelets 08/27/2024 257  150 - 450 K/uL Final    MPV 08/27/2024 11.7  9.2 - 12.9 fL Final    Immature Granulocytes 08/27/2024 0.3  0.0 - 0.5 % Final    Gran # (ANC) 08/27/2024 6.1  1.8 - 7.7 K/uL Final    Immature Grans (Abs) 08/27/2024 0.03  0.00 - 0.04 K/uL Final    Comment: Mild elevation in immature granulocytes is non specific and   can be seen in a variety of conditions including stress response,   acute inflammation, trauma and pregnancy. Correlation with other   laboratory and clinical findings is essential.      Lymph # 08/27/2024 2.0  1.0 - 4.8 K/uL Final    Mono # 08/27/2024 0.6  0.3 - 1.0 K/uL Final    Eos # 08/27/2024 0.1  0.0 - 0.5 K/uL Final    Baso # 08/27/2024 0.04  0.00 - 0.20 K/uL Final    nRBC 08/27/2024 0  0 /100 WBC Final    Gran % 08/27/2024 69.7  38.0 - 73.0 % Final    Lymph % 08/27/2024 22.3  18.0 - 48.0 % Final    Mono % 08/27/2024 6.3  4.0 - 15.0 % Final    Eosinophil % 08/27/2024 0.9  0.0 - 8.0 % Final    Basophil % 08/27/2024 0.5  0.0 - 1.9 % Final    Differential Method 08/27/2024 Automated   Final    Sodium 08/27/2024 136  136 - 145 mmol/L Final    Potassium 08/27/2024 4.0  3.5 - 5.1 mmol/L Final    Chloride 08/27/2024 100  95 - 110 mmol/L Final    CO2 08/27/2024 23  23 - 29 mmol/L Final    Glucose 08/27/2024 134 (H)  70 - 110 mg/dL Final    BUN 08/27/2024 42 (H)  8 - 23 mg/dL Final    Creatinine 08/27/2024 2.7 (H)  0.5 - 1.4 mg/dL Final    Calcium 08/27/2024 8.8  8.7 - 10.5 mg/dL Final    Total Protein 08/27/2024 6.1  6.0 - 8.4 g/dL Final    Albumin 08/27/2024 2.8 (L)  3.5 - 5.2 g/dL Final    Total Bilirubin 08/27/2024 1.1 (H)  0.1 - 1.0 mg/dL Final    Comment: For infants and newborns, interpretation of results should be based  on gestational age, weight and in agreement with  clinical  observations.    Premature Infant recommended reference ranges:  Up to 24 hours.............<8.0 mg/dL  Up to 48 hours............<12.0 mg/dL  3-5 days..................<15.0 mg/dL  6-29 days.................<15.0 mg/dL      Alkaline Phosphatase 08/27/2024 121  55 - 135 U/L Final    AST 08/27/2024 17  10 - 40 U/L Final    ALT 08/27/2024 12  10 - 44 U/L Final    eGFR 08/27/2024 23.8 (A)  >60 mL/min/1.73 m^2 Final    Anion Gap 08/27/2024 13  8 - 16 mmol/L Final    Specimen UA 08/27/2024 Urine, Clean Catch   Final    Color, UA 08/27/2024 Yellow  Yellow, Straw, Stacy Final    Appearance, UA 08/27/2024 Clear  Clear Final    pH, UA 08/27/2024 6.0  5.0 - 8.0 Final    Specific Gravity, UA 08/27/2024 1.015  1.005 - 1.030 Final    Protein, UA 08/27/2024 Trace (A)  Negative Final    Comment: Recommend a 24 hour urine protein or a urine   protein/creatinine ratio if globulin induced proteinuria is  clinically suspected.      Glucose, UA 08/27/2024 Negative  Negative Final    Ketones, UA 08/27/2024 Negative  Negative Final    Bilirubin (UA) 08/27/2024 Negative  Negative Final    Occult Blood UA 08/27/2024 Negative  Negative Final    Nitrite, UA 08/27/2024 Negative  Negative Final    Urobilinogen, UA 08/27/2024 Negative  Negative EU/dL Final    Leukocytes, UA 08/27/2024 Negative  Negative Final   Clinical Support on 08/25/2024   Component Date Value Ref Range Status    ICD Shock 08/18/2024 No   Final    Device Type 08/18/2024 Loop   Final    AF [place] % 08/18/2024 19.8   Final   Admission on 08/14/2024, Discharged on 08/15/2024   Component Date Value Ref Range Status    Specimen UA 08/14/2024 Urine, Unspecified   Final    Color, UA 08/14/2024 Yellow  Yellow, Straw, Stacy Final    Appearance, UA 08/14/2024 Clear  Clear Final    pH, UA 08/14/2024 5.0  5.0 - 8.0 Final    Specific Gravity, UA 08/14/2024 1.010  1.005 - 1.030 Final    Protein, UA 08/14/2024 Negative  Negative Final    Comment: Recommend a 24 hour urine  protein or a urine   protein/creatinine ratio if globulin induced proteinuria is  clinically suspected.      Glucose, UA 08/14/2024 Negative  Negative Final    Ketones, UA 08/14/2024 Negative  Negative Final    Bilirubin (UA) 08/14/2024 Negative  Negative Final    Occult Blood UA 08/14/2024 Negative  Negative Final    Nitrite, UA 08/14/2024 Negative  Negative Final    Urobilinogen, UA 08/14/2024 Negative  Negative EU/dL Final    Leukocytes, UA 08/14/2024 Negative  Negative Final    QRS Duration 08/14/2024 126  ms Final    [facility] QTC Calculation 08/14/2024 491  ms Final    POC Rapid COVID 08/14/2024 Negative  Negative Final     Acceptable 08/14/2024 Yes   Final    POC Molecular Influenza A Ag 08/14/2024 Negative  Negative Final    POC Molecular Influenza B Ag 08/14/2024 Negative  Negative Final     Acceptable 08/14/2024 Yes   Final    WBC 08/14/2024 8.15  3.90 - 12.70 K/uL Final    RBC 08/14/2024 3.58 (L)  4.60 - 6.20 M/uL Final    Hemoglobin 08/14/2024 9.7 (L)  14.0 - 18.0 g/dL Final    Hematocrit 08/14/2024 31.2 (L)  40.0 - 54.0 % Final    MCV 08/14/2024 87  82 - 98 fL Final    MCH 08/14/2024 27.1  27.0 - 31.0 pg Final    MCHC 08/14/2024 31.1 (L)  32.0 - 36.0 g/dL Final    RDW 08/14/2024 15.0 (H)  11.5 - 14.5 % Final    Platelets 08/14/2024 201  150 - 450 K/uL Final    MPV 08/14/2024 10.7  9.2 - 12.9 fL Final    Immature Granulocytes 08/14/2024 0.4  0.0 - 0.5 % Final    Gran # (ANC) 08/14/2024 6.6  1.8 - 7.7 K/uL Final    Immature Grans (Abs) 08/14/2024 0.03  0.00 - 0.04 K/uL Final    Comment: Mild elevation in immature granulocytes is non specific and   can be seen in a variety of conditions including stress response,   acute inflammation, trauma and pregnancy. Correlation with other   laboratory and clinical findings is essential.      Lymph # 08/14/2024 0.9 (L)  1.0 - 4.8 K/uL Final    Mono # 08/14/2024 0.6  0.3 - 1.0 K/uL Final    Eos # 08/14/2024 0.0  0.0 - 0.5 K/uL Final    Baso #  08/14/2024 0.02  0.00 - 0.20 K/uL Final    nRBC 08/14/2024 0  0 /100 WBC Final    Gran % 08/14/2024 80.8 (H)  38.0 - 73.0 % Final    Lymph % 08/14/2024 10.9 (L)  18.0 - 48.0 % Final    Mono % 08/14/2024 7.6  4.0 - 15.0 % Final    Eosinophil % 08/14/2024 0.1  0.0 - 8.0 % Final    Basophil % 08/14/2024 0.2  0.0 - 1.9 % Final    Differential Method 08/14/2024 Automated   Final    Sodium 08/14/2024 139  136 - 145 mmol/L Final    Potassium 08/14/2024 3.2 (L)  3.5 - 5.1 mmol/L Final    Chloride 08/14/2024 103  95 - 110 mmol/L Final    CO2 08/14/2024 20 (L)  23 - 29 mmol/L Final    Glucose 08/14/2024 89  70 - 110 mg/dL Final    BUN 08/14/2024 49 (H)  8 - 23 mg/dL Final    Creatinine 08/14/2024 2.6 (H)  0.5 - 1.4 mg/dL Final    Calcium 08/14/2024 8.8  8.7 - 10.5 mg/dL Final    Total Protein 08/14/2024 6.3  6.0 - 8.4 g/dL Final    Albumin 08/14/2024 2.5 (L)  3.5 - 5.2 g/dL Final    Total Bilirubin 08/14/2024 1.1 (H)  0.1 - 1.0 mg/dL Final    Comment: For infants and newborns, interpretation of results should be based  on gestational age, weight and in agreement with clinical  observations.    Premature Infant recommended reference ranges:  Up to 24 hours.............<8.0 mg/dL  Up to 48 hours............<12.0 mg/dL  3-5 days..................<15.0 mg/dL  6-29 days.................<15.0 mg/dL      Alkaline Phosphatase 08/14/2024 128  55 - 135 U/L Final    AST 08/14/2024 23  10 - 40 U/L Final    ALT 08/14/2024 14  10 - 44 U/L Final    eGFR 08/14/2024 24.9 (A)  >60 mL/min/1.73 m^2 Final    Anion Gap 08/14/2024 16  8 - 16 mmol/L Final    D-Dimer 08/14/2024 10.50 (H)  <0.50 mg/L FEU Final    Comment: The quantitative D-dimer assay should be used as an aid in   the diagnosis of deep vein thrombosis and pulmonary embolism  in patients with the appropriate presentation and clinical  history. The upper limit of the reference interval and the clinical   cut off   point are identical. Causes of a positive (>0.50 mg/L FEU) D-Dimer    test  include, but are not limited to: DVT, PE, DIC, thrombolytic   therapy, anticoagulant therapy, recent surgery, trauma, or   pregnancy, disseminated malignancy, aortic aneurysm, cirrhosis,  and severe infection. False negative results may occur in   patients with distal DVT.  DAK[place]^612^^16^  LOT^610^DDiSup^[id number]\DDiBuf^[id number]\DDiReag^[id number]      Troponin I 08/14/2024 0.128 (H)  0.000 - 0.026 ng/mL Final    Comment: The reference interval for Troponin I represents the 99th percentile   cutoff   for our facility and is consistent with 3rd generation assay   performance.      POCT Glucose 08/14/2024 99  70 - 110 mg/dL Final    BNP 08/14/2024 1,543 (H)  0 - 99 pg/mL Final    Values of less than 100 pg/ml are consistent with non-CHF populations.    Magnesium 08/14/2024 1.6  1.6 - 2.6 mg/dL Final    Phosphorus 08/14/2024 4.3  2.7 - 4.5 mg/dL Final    Sodium 08/14/2024 141  136 - 145 mmol/L Final    Potassium 08/14/2024 3.2 (L)  3.5 - 5.1 mmol/L Final    Chloride 08/14/2024 104  95 - 110 mmol/L Final    CO2 08/14/2024 23  23 - 29 mmol/L Final    Glucose 08/14/2024 63 (L)  70 - 110 mg/dL Final    BUN 08/14/2024 45 (H)  8 - 23 mg/dL Final    Creatinine 08/14/2024 2.4 (H)  0.5 - 1.4 mg/dL Final    Calcium 08/14/2024 8.4 (L)  8.7 - 10.5 mg/dL Final    Anion Gap 08/14/2024 14  8 - 16 mmol/L Final    eGFR 08/14/2024 27.5 (A)  >60 mL/min/1.73 m^2 Final    POCT Glucose 08/14/2024 73  70 - 110 mg/dL Final    Heparin Anti-Xa 08/14/2024 0.18 (L)  0.30 - 0.70 IU/mL Final    Comment: Expected therapeutic range for Unfractionated heparin (UFH)  is 0.3-0.7 IU/mL.  The therapeutic range for low molecular weight heparins   (LMWH) varies with the type and , but is   typically between 0.4 and 1.1 IU/mL.      Prothrombin Time 08/14/2024 12.2  9.0 - 12.5 sec Final    INR 08/14/2024 1.1  0.8 - 1.2 Final    Comment: Coumadin Therapy:  2.0 - 3.0 for INR for all indicators except mechanical heart valves  and antiphospholipid  syndromes which should use 2.5 - 3.5.  LOT^040^PT Inn^[id number]      WBC 08/14/2024 7.44  3.90 - 12.70 K/uL Final    RBC 08/14/2024 3.46 (L)  4.60 - 6.20 M/uL Final    Hemoglobin 08/14/2024 9.5 (L)  14.0 - 18.0 g/dL Final    Hematocrit 08/14/2024 30.9 (L)  40.0 - 54.0 % Final    MCV 08/14/2024 89  82 - 98 fL Final    MCH 08/14/2024 27.5  27.0 - 31.0 pg Final    MCHC 08/14/2024 30.7 (L)  32.0 - 36.0 g/dL Final    RDW 08/14/2024 15.3 (H)  11.5 - 14.5 % Final    Platelets 08/14/2024 192  150 - 450 K/uL Final    MPV 08/14/2024 11.6  9.2 - 12.9 fL Final    Immature Granulocytes 08/14/2024 0.3  0.0 - 0.5 % Final    Gran # (ANC) 08/14/2024 5.8  1.8 - 7.7 K/uL Final    Immature Grans (Abs) 08/14/2024 0.02  0.00 - 0.04 K/uL Final    Comment: Mild elevation in immature granulocytes is non specific and   can be seen in a variety of conditions including stress response,   acute inflammation, trauma and pregnancy. Correlation with other   laboratory and clinical findings is essential.      Lymph # 08/14/2024 1.0  1.0 - 4.8 K/uL Final    Mono # 08/14/2024 0.6  0.3 - 1.0 K/uL Final    Eos # 08/14/2024 0.0  0.0 - 0.5 K/uL Final    Baso # 08/14/2024 0.01  0.00 - 0.20 K/uL Final    nRBC 08/14/2024 0  0 /100 WBC Final    Gran % 08/14/2024 77.5 (H)  38.0 - 73.0 % Final    Lymph % 08/14/2024 13.6 (L)  18.0 - 48.0 % Final    Mono % 08/14/2024 8.2  4.0 - 15.0 % Final    Eosinophil % 08/14/2024 0.3  0.0 - 8.0 % Final    Basophil % 08/14/2024 0.1  0.0 - 1.9 % Final    Differential Method 08/14/2024 Automated   Final    Sodium 08/15/2024 144  136 - 145 mmol/L Final    Potassium 08/15/2024 3.8  3.5 - 5.1 mmol/L Final    Chloride 08/15/2024 105  95 - 110 mmol/L Final    CO2 08/15/2024 25  23 - 29 mmol/L Final    Glucose 08/15/2024 89  70 - 110 mg/dL Final    BUN 08/15/2024 45 (H)  8 - 23 mg/dL Final    Creatinine 08/15/2024 2.3 (H)  0.5 - 1.4 mg/dL Final    Calcium 08/15/2024 8.7  8.7 - 10.5 mg/dL Final    Total Protein 08/15/2024 6.1  6.0 - 8.4  g/dL Final    Albumin 08/15/2024 2.4 (L)  3.5 - 5.2 g/dL Final    Total Bilirubin 08/15/2024 1.0  0.1 - 1.0 mg/dL Final    Comment: For infants and newborns, interpretation of results should be based  on gestational age, weight and in agreement with clinical  observations.    Premature Infant recommended reference ranges:  Up to 24 hours.............<8.0 mg/dL  Up to 48 hours............<12.0 mg/dL  3-5 days..................<15.0 mg/dL  6-29 days.................<15.0 mg/dL      Alkaline Phosphatase 08/15/2024 123  55 - 135 U/L Final    AST 08/15/2024 20  10 - 40 U/L Final    ALT 08/15/2024 13  10 - 44 U/L Final    eGFR 08/15/2024 28.9 (A)  >60 mL/min/1.73 m^2 Final    Anion Gap 08/15/2024 14  8 - 16 mmol/L Final    WBC 08/15/2024 6.02  3.90 - 12.70 K/uL Final    RBC 08/15/2024 3.57 (L)  4.60 - 6.20 M/uL Final    Hemoglobin 08/15/2024 9.8 (L)  14.0 - 18.0 g/dL Final    Hematocrit 08/15/2024 31.9 (L)  40.0 - 54.0 % Final    MCV 08/15/2024 89  82 - 98 fL Final    MCH 08/15/2024 27.5  27.0 - 31.0 pg Final    MCHC 08/15/2024 30.7 (L)  32.0 - 36.0 g/dL Final    RDW 08/15/2024 15.3 (H)  11.5 - 14.5 % Final    Platelets 08/15/2024 197  150 - 450 K/uL Final    MPV 08/15/2024 10.8  9.2 - 12.9 fL Final    Immature Granulocytes 08/15/2024 0.3  0.0 - 0.5 % Final    Gran # (ANC) 08/15/2024 4.4  1.8 - 7.7 K/uL Final    Immature Grans (Abs) 08/15/2024 0.02  0.00 - 0.04 K/uL Final    Comment: Mild elevation in immature granulocytes is non specific and   can be seen in a variety of conditions including stress response,   acute inflammation, trauma and pregnancy. Correlation with other   laboratory and clinical findings is essential.      Lymph # 08/15/2024 1.1  1.0 - 4.8 K/uL Final    Mono # 08/15/2024 0.5  0.3 - 1.0 K/uL Final    Eos # 08/15/2024 0.0  0.0 - 0.5 K/uL Final    Baso # 08/15/2024 0.02  0.00 - 0.20 K/uL Final    nRBC 08/15/2024 0  0 /100 WBC Final    Gran % 08/15/2024 72.8  38.0 - 73.0 % Final    Lymph % 08/15/2024  17.9 (L)  18.0 - 48.0 % Final    Mono % 08/15/2024 8.0  4.0 - 15.0 % Final    Eosinophil % 08/15/2024 0.7  0.0 - 8.0 % Final    Basophil % 08/15/2024 0.3  0.0 - 1.9 % Final    Differential Method 08/15/2024 Automated   Final    Heparin Anti-Xa 08/15/2024 0.68  0.30 - 0.70 IU/mL Final    Comment: Expected therapeutic range for Unfractionated heparin (UFH)  is 0.3-0.7 IU/mL.  The therapeutic range for low molecular weight heparins   (LMWH) varies with the type and , but is   typically between 0.4 and 1.1 IU/mL.      WBC 08/15/2024 6.02  3.90 - 12.70 K/uL Final    RBC 08/15/2024 3.57 (L)  4.60 - 6.20 M/uL Final    Hemoglobin 08/15/2024 9.8 (L)  14.0 - 18.0 g/dL Final    Hematocrit 08/15/2024 31.9 (L)  40.0 - 54.0 % Final    MCV 08/15/2024 89  82 - 98 fL Final    MCH 08/15/2024 27.5  27.0 - 31.0 pg Final    MCHC 08/15/2024 30.7 (L)  32.0 - 36.0 g/dL Final    RDW 08/15/2024 15.3 (H)  11.5 - 14.5 % Final    Platelets 08/15/2024 197  150 - 450 K/uL Final    MPV 08/15/2024 10.8  9.2 - 12.9 fL Final    Immature Granulocytes 08/15/2024 0.3  0.0 - 0.5 % Final    Gran # (ANC) 08/15/2024 4.4  1.8 - 7.7 K/uL Final    Immature Grans (Abs) 08/15/2024 0.02  0.00 - 0.04 K/uL Final    Comment: Mild elevation in immature granulocytes is non specific and   can be seen in a variety of conditions including stress response,   acute inflammation, trauma and pregnancy. Correlation with other   laboratory and clinical findings is essential.      Lymph # 08/15/2024 1.1  1.0 - 4.8 K/uL Final    Mono # 08/15/2024 0.5  0.3 - 1.0 K/uL Final    Eos # 08/15/2024 0.0  0.0 - 0.5 K/uL Final    Baso # 08/15/2024 0.02  0.00 - 0.20 K/uL Final    nRBC 08/15/2024 0  0 /100 WBC Final    Gran % 08/15/2024 72.8  38.0 - 73.0 % Final    Lymph % 08/15/2024 17.9 (L)  18.0 - 48.0 % Final    Mono % 08/15/2024 8.0  4.0 - 15.0 % Final    Eosinophil % 08/15/2024 0.7  0.0 - 8.0 % Final    Basophil % 08/15/2024 0.3  0.0 - 1.9 % Final    Differential Method  08/15/2024 Automated   Final    POCT Glucose 08/15/2024 84  70 - 110 mg/dL Final    aPTT 08/15/2024 58.7 (H)  21.0 - 32.0 sec Final    Comment: Refer to local heparin nomogram for intensity/dose specific   therapeutic   range.  LOT^050^APTT FSL^[id number]      Prothrombin Time 08/15/2024 11.9  9.0 - 12.5 sec Final    INR 08/15/2024 1.1  0.8 - 1.2 Final    Comment: Coumadin Therapy:  2.0 - 3.0 for INR for all indicators except mechanical heart valves  and antiphospholipid syndromes which should use 2.5 - 3.5.  LOT^040^PT Inn^[id number]      WBC 08/15/2024 5.79  3.90 - 12.70 K/uL Final    RBC 08/15/2024 3.58 (L)  4.60 - 6.20 M/uL Final    Hemoglobin 08/15/2024 9.8 (L)  14.0 - 18.0 g/dL Final    Hematocrit 08/15/2024 32.1 (L)  40.0 - 54.0 % Final    MCV 08/15/2024 90  82 - 98 fL Final    MCH 08/15/2024 27.4  27.0 - 31.0 pg Final    MCHC 08/15/2024 30.5 (L)  32.0 - 36.0 g/dL Final    RDW 08/15/2024 15.3 (H)  11.5 - 14.5 % Final    Platelets 08/15/2024 203  150 - 450 K/uL Final    MPV 08/15/2024 10.7  9.2 - 12.9 fL Final    Immature Granulocytes 08/15/2024 0.3  0.0 - 0.5 % Final    Gran # (ANC) 08/15/2024 4.4  1.8 - 7.7 K/uL Final    Immature Grans (Abs) 08/15/2024 0.02  0.00 - 0.04 K/uL Final    Comment: Mild elevation in immature granulocytes is non specific and   can be seen in a variety of conditions including stress response,   acute inflammation, trauma and pregnancy. Correlation with other   laboratory and clinical findings is essential.      Lymph # 08/15/2024 0.8 (L)  1.0 - 4.8 K/uL Final    Mono # 08/15/2024 0.5  0.3 - 1.0 K/uL Final    Eos # 08/15/2024 0.0  0.0 - 0.5 K/uL Final    Baso # 08/15/2024 0.02  0.00 - 0.20 K/uL Final    nRBC 08/15/2024 0  0 /100 WBC Final    Gran % 08/15/2024 76.6 (H)  38.0 - 73.0 % Final    Lymph % 08/15/2024 14.5 (L)  18.0 - 48.0 % Final    Mono % 08/15/2024 7.8  4.0 - 15.0 % Final    Eosinophil % 08/15/2024 0.5  0.0 - 8.0 % Final    Basophil % 08/15/2024 0.3  0.0 - 1.9 % Final     Differential Method 08/15/2024 Automated   Final    Troponin I 08/15/2024 0.101 (H)  0.000 - 0.026 ng/mL Final    Comment: The reference interval for Troponin I represents the 99th percentile   cutoff   for our facility and is consistent with 3rd generation assay   performance.     There may be more visits with results that are not included.       EKG  Reviewed    Echo   Results for orders placed or performed during the hospital encounter of 08/04/24   Echo Saline Bubble? No   Result Value Ref Range    BSA 2.2 m2    A4C EF 48 %    LVOT stroke volume 50.22 cm3    LVIDd 4.50 3.5 - 6.0 cm    LV Systolic Volume 44.61 mL    LV Systolic Volume Index 20.3 mL/m2    LVIDs 3.31 2.1 - 4.0 cm    LV Diastolic Volume 92.45 mL    LV Diastolic Volume Index 42.02 mL/m2    LV EDV A4C 156.15 mL    Left Ventricular End Systolic Volume by Teichholz Method 44.61 mL    Left Ventricular End Diastolic Volume by Teichholz Method 92.45 mL    IVS 1.72 (A) 0.6 - 1.1 cm    LVOT diameter 2.13 cm    LVOT area 3.6 cm2    FS 26 (A) 28 - 44 %    Left Ventricle Relative Wall Thickness 0.60 cm    PW 1.35 (A) 0.6 - 1.1 cm    LV mass 285.70 g    LV Mass Index 130 g/m2    MV Peak E Joao 1.22 m/s    TDI LATERAL 0.11 m/s    TDI SEPTAL 0.05 m/s    E/E' ratio 15.25 m/s    MV Peak A Joao 0.98 m/s    TR Max Joao 4.21 m/s    E/A ratio 1.24     E wave deceleration time 131.69 msec    LV SEPTAL E/E' RATIO 24.40 m/s    LV LATERAL E/E' RATIO 11.09 m/s    PV Peak S Joao 0.37 m/s    PV Peak D Joao 0.90 m/s    Pulm vein S/D ratio 0.41     LVOT peak joao 0.89 m/s    Left Ventricular Outflow Tract Mean Velocity 0.57 cm/s    Left Ventricular Outflow Tract Mean Gradient 1.43 mmHg    RV/LV Ratio 0.90 cm    LA size 4.07 cm    Left Atrium Minor Axis 8.05 cm    Left Atrium Major Axis 8.57 cm    RA Major Axis 6.18 cm    AV regurgitation pressure 1/2 time [id number] ms    AR Max Joao 3.09 m/s    AV mean gradient 5 mmHg    AV peak gradient 9 mmHg    Ao peak joao 1.48 m/s    Ao  VTI 19.30 cm    LVOT peak VTI 14.10 cm    AV valve area 2.60 cm²    AV Velocity Ratio 0.60     AV index (prosthetic) 0.73     KJ by Velocity Ratio 2.14 cm²    Mr max bebe 5.22 m/s    MV mean gradient 2 mmHg    MV peak gradient 5 mmHg    MV stenosis pressure 1/2 time 38.19 ms    MV valve area p 1/2 method 5.76 cm2    MV valve area by continuity eq 2.30 cm2    MV VTI 21.8 cm    Triscuspid Valve Regurgitation Peak Gradient 71 mmHg    PV PEAK VELOCITY 0.90 m/s    PV peak gradient 3 mmHg    Pulmonary Valve Mean Velocity 0.66 m/s    Ao root annulus 3.73 cm    STJ 2.80 cm    Ascending aorta 3.27 cm    IVC diameter 2.07 cm    Mean e' 0.08 m/s    ZLVIDS -2.54     ZLVIDD -5.12     RVDD 4.06 cm    AORTIC VALVE CUSP SEPERATION 2.06 cm    JAKI 50.9 mL/m2    LA Vol 112.01 cm3    LA Vol (MOD) 105.00 cm3    LA WIDTH 3.9 cm    JAKI (MOD) 47.7 mL/m2    RA Width 3.8 cm    TV resting pulmonary artery pressure 79 mmHg    RV TB RVSP 12 mmHg    Est. RA pres 8 mmHg    EF 35 %    Narrative      Left Ventricle: The left ventricle is normal in size. Moderately   increased wall thickness. There is concentric hypertrophy. Moderate global   hypokinesis present. There is moderately reduced systolic function.   Ejection fraction by visual approximation is 35%. Grade II diastolic   dysfunction. Elevated left ventricular filling pressure. Tissue Doppler   velocity is reduced.    Right Ventricle: Normal right ventricular cavity size. Wall thickness   is normal. Systolic function is normal.    Left Atrium: Left atrium is severely dilated.    Right Atrium: Right atrium is moderately dilated.    Aortic Valve: There is mild aortic valve sclerosis. Mildly restricted   motion.    Mitral Valve: There is moderate regurgitation with a centrally directed   jet.    Tricuspid Valve: There is mild to moderate regurgitation with a   centrally directed jet.    Pulmonary Artery: There is severe pulmonary hypertension. The estimated   pulmonary artery systolic  pressure is 79 mmHg.    IVC/SVC: Intermediate venous pressure at 8 mmHg.    Pericardium: There is a small posterior effusion.         Imaging  X-Ray Hand 3 View Right    Result Date: 9/24/2024  EXAMINATION: XR HAND COMPLETE 3 VIEW RIGHT CLINICAL HISTORY: Pain in right hand TECHNIQUE: PA, lateral, and oblique views of the right hand were performed. COMPARISON: 06/24/2024 FINDINGS: No fracture or dislocation.  Again demonstrated is chondrocalcinosis of the MCP joints and of the carpus.  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  There is mild osteoarthritis of the 1st MCP joint and of the 1st carpometacarpal joint. Again demonstrated are lucencies within the carpal bones which could be related to underlying degenerative change or could indicate erosions in the setting of inflammatory arthropathy. Diffuse soft tissue swelling is again noted.     See above. Electronically signed by: Tripp Alfaro Date:    09/24/2024 Time:    16:52    X-Ray Chest 1 View    Result Date: 9/18/2024  EXAMINATION: XR CHEST 1 VIEW CLINICAL HISTORY: Hypotension, unspecified TECHNIQUE: Single frontal view of the chest was performed. COMPARISON: 09/10/2024. FINDINGS: There is a loop recorder device.  The lungs are well expanded.  There are calcified pleural plaques which can be seen with priors pes dose exposure.  There is a small left pleural effusion.  There is left basilar atelectasis.  Pulmonary parenchyma is unchanged from prior.  No new focal consolidation.  The cardiac silhouette is enlarged.  There are calcifications of the aortic arch.  Osseous structures are intact.     No acute cardiopulmonary abnormality or significant detrimental change from prior. Electronically signed by: Antonio Casey Date:    09/18/2024 Time:    20:07    CT Abdomen Pelvis  Without Contrast    Result Date: 9/11/2024  EXAMINATION: CT ABDOMEN PELVIS WITHOUT CONTRAST CLINICAL HISTORY: Abdominal pain, acute,  nonlocalized; TECHNIQUE: Low dose axial images, sagittal and coronal reformations were obtained from the lung bases to the pubic symphysis, Oral contrast was not administered. COMPARISON: 05/20/2024 FINDINGS: Heart: Again demonstrated is multi chamber cardiac enlargement with coronary arterial calcification. Lung Bases: No acute findings. Liver: Stable size.  Suboptimal evaluation for potential hepatic masses in the absence of intravenous contrast. Gallbladder: Surgically absent. Bile Ducts: Stable appearance of the common bile duct relative to the prior study. Pancreas: Stable size without peripancreatic fat stranding. Spleen: Normal size. Adrenals: Unremarkable. Kidneys/ Ureters: Similar hypodensities within the left kidney which have been previously characterized as simple cysts.  There is no hydronephrosis.  No calculi. Bladder: No calcified stones. Reproductive organs: No change. GI Tract/Mesentery: No obstruction.  Again demonstrated is a right lower quadrant ostomy site with a parastomal hernia. Peritoneal Space: No ascites. No free air. Retroperitoneum: No significant adenopathy. Abdominal wall: Bilateral fat containing inguinal hernias. Vasculature: No aneurysm.  Mild atherosclerotic calcification of the abdominal aorta. Bones: There is chronic degenerative change of the spine.  No acute findings.     No acute findings. Electronically signed by: Tripp Alfaro Date:    09/11/2024 Time:    10:39    US Lower Extremity Veins Bilateral    Result Date: 9/10/2024  EXAMINATION: US LOWER EXTREMITY VEINS BILATERAL CLINICAL HISTORY: Localized edema.  Known DVT on 08/14/2024. TECHNIQUE: Duplex and color flow Doppler and dynamic compression was performed of the bilateral lower extremity veins was performed. COMPARISON: 08/14/2024. FINDINGS: Right thigh veins: The common femoral, femoral, popliteal, upper greater saphenous, and deep femoral veins are patent and free of thrombus. The veins are normally compressible and have  normal phasic flow and augmentation response. Right calf veins: The visualized calf veins are patent. Left thigh veins: The common femoral, femoral, popliteal, upper greater saphenous, and deep femoral veins are patent and free of thrombus. The veins are normally compressible and have normal phasic flow and augmentation response.  Apparent resolution of previously seen DVT in the left iliac, common femoral, and greater saphenous veins. Left calf veins: The visualized calf veins are patent. Miscellaneous: Mild soft tissue edema.  No organized fluid collection.     No evidence of deep venous thrombosis in either lower extremity.  Deep vein thrombosis identified on 08/14/2024 no longer visualized. Electronically signed by: Jarrett Ren MD Date:    09/10/2024 Time:    20:11    X-Ray Wrist Complete Right    Result Date: 9/10/2024  EXAMINATION: XR WRIST COMPLETE 3 VIEWS RIGHT CLINICAL HISTORY: Pain, unspecified TECHNIQUE: PA, lateral, and oblique views of the right wrist were performed. COMPARISON: 07/06/2024. FINDINGS: There are cystic changes in the lunate and triquetrum.  The remaining bone mineralization is within normal limits.  There is no cortical step-off.  There is no evidence of periostitis. There is unchanged appearance of chondrocalcinosis in the region of the TFCC.  There also unchanged calcifications of the base of the thumb.  There is joint space narrowing throughout the right wrist. There is soft tissue swelling of the right wrist.  No radiopaque foreign body is identified There is no evidence of a fracture or dislocation.     No evidence of a fracture or dislocation of the right wrist. Soft tissue swelling of the right wrist. Chondrocalcinosis of the right wrist. Electronically signed by: Manav Odonnell MD Date:    09/10/2024 Time:    14:22    X-Ray Chest 1 View    Result Date: 9/10/2024  EXAMINATION: XR CHEST 1 VIEW CLINICAL HISTORY: ankle swelling TECHNIQUE: Single frontal view of the chest was  performed. COMPARISON: 08/27/2024. 09/09/2024. FINDINGS: There is a loop recorder in place.  There are postoperative changes in the left upper abdominal quadrant. The trachea is unremarkable.  There are calcifications of the aortic knob.  The cardiomediastinal silhouette is stable.  There is no evidence of free air beneath the hemidiaphragms.  There is unchanged obscuration of the left lateral costophrenic angle.  There is no evidence of a pneumothorax.  There is no evidence of pneumomediastinum.  No airspace opacity is present.  There are unchanged scattered areas of scarring in the left hemithorax.  There are degenerative changes in the osseous structures.     No acute intrathoracic process. No radiographic evidence of CHF. Electronically signed by: Manav Odonnell MD Date:    09/10/2024 Time:    14:17    CT Chest Without Contrast    Result Date: 9/9/2024  EXAMINATION: CT CHEST WITHOUT CONTRAST CLINICAL HISTORY: Thoracic Ascending Aortic Aneurysm; Aneurysm of the ascending aorta, without rupture TECHNIQUE: Low dose axial images, sagittal and coronal reformations were obtained from the thoracic inlet to the lung bases. Contrast was not administered. COMPARISON: CT of the chest with no contrast dated 03/03/2024, CT of the abdomen pelvis with no contrast dated 05/20/2024 FINDINGS: Base of Neck: No significant abnormality. Thoracic soft tissues: Loop recorder visualized in the left anterior chest wall. Aorta: Left-sided aortic arch.  Mild calcific atherosclerotic disease.  Stable fusiform dilatation of the ascending aorta measuring up to 4.6 cm. Heart: Coronary calcific atherosclerotic disease.  Cardiomegaly. Trace pericardial effusion, similar to prior.. Pulmonary vasculature: Unremarkable. Melva/Mediastinum: No pathologic radha enlargement. Airways: Trachea and bronchi are patent. Lungs/Pleura: Complete evaluation of the lung parenchyma partially limited by respiratory motion artifact. Similar pattern of bibasilar  linear bandlike opacities suggestive of atelectasis versus scarring.  Similar bilateral pleural thickening in the posterior lower lobes.  Stable pleural calcification in the left lower lobe.  Calcified granuloma in the right lower lobe.  No focal mass or consolidation.  No pneumothorax or pleural effusion. Esophagus: Unremarkable. Upper Abdomen: Cholecystectomy.  Otherwise unremarkable. Bones: Osseous degenerative change of the spine.  Osteopenia.  Exaggerated thoracic kyphosis.  No acute fracture. No suspicious lytic or sclerotic lesions.     1. Stable fusiform dilatation of the ascending aorta measuring up to 4.6 cm. 2. Similar pattern of bibasilar atelectasis versus scarring with mild pleural thickening. 3. Additional findings as above. Electronically signed by resident: Jay Santiago Date:    09/09/2024 Time:    09:21 Electronically signed by: Pal West Date:    09/09/2024 Time:    15:28      Prior coronary angiogram / intervention:  As above    Assessment and Plan  Tachycardia  Heart rate in the 90s today  Not currently an issue   Switched carvedilol to Toprol 25    HFrEF (heart failure with reduced ejection fraction) (Primary)  Unable to tolerate ACEi/ARB or Entresto due to hypotensive side effects  Currently taking midodrine to keep blood pressure normalized, hydralazine as needed for rebound hypertension  Started on Toprol 25  Advise Lasix for lower extremity edema for 3 days, instructed patient to monitor blood pressure and decrease swelling, verbalized understanding  Advised daily weights  Encourage low-sodium diets and 1.5-2 L per day fluid restriction  Wt Readings from Last 3 Encounters:   10/01/24 83.4 kg (183 lb 13.8 oz)   09/30/24 83.6 kg (184 lb 4.9 oz)   09/24/24 82.6 kg (181 lb 15.8 oz)     History of DVT  On Eliquis     Atrial fibrillation  Followed by EP, may be a candidate for Watchman given hypotensive events and increased risk for falls  On AC  Started on Toprol 25     Ascending  thoracic aortic aneurysm  Surveillance control blood pressure  On beta-blocker  Followed by CT surgery     Syncope, orthostatic / autonomic dysfunction  / chronic hypotension  Continue midodrine  Follow up with Neurology, Nephrology, Endocrinology, Gastroenterology, PCP     Coronary artery disease involving native coronary artery of native heart without angina pectoris  Presence of arterial stent  Continue aspirin 81 and Pravachol 40  Evidently there are allergies to Crestor and Lipitor. These allergies are unknown.    Hyperlipidemia / Aortic arteriosclerosis  As above    Hypertension   Currently has been hypotensive and taking midodrine  Hydralazine PRN for rebound hypertension caused by midodrine      Follow Up  Follow up in about 3 months (around 1/1/2025), or if symptoms worsen or fail to improve.       Brandi R. Carter, FNP-C [facility] - Cardiology    Total professional time spent for the encounter: 45 minutes  Time was spent preparing to see the patient, reviewing results of prior testing, obtaining and/or reviewing separately obtained history, performing a medically appropriate examination and interview, counseling and educating the patient/family, ordering medications/tests/procedures, referring and communicating with other health care professionals, documenting clinical information in the electronic health record, and independently interpreting results.

## 2024-10-01 NOTE — PATIENT INSTRUCTIONS
Stop taking carvedilol and start taking metoprolol for heart failure. Remember metoprolol does not decrease you blood pressure this is why we are changing this medication.    Stop taking furosemide daily because it also decreases your blood pressure. Only take this medication as needed for swelling in your legs for up to 3 days and then stop. It is very important to wear compressions stockings daily and keep your leg elevated above the level of you heart when sitting or lying. Follow the instructions below.    Daily weights  - First thing in the morning: Urinate, take off clothes, step on the scale.  - Write down the date, and the weight. Maintain this chart everyday  - If weight increases by > 3 lbs in a day, or > 5 lbs in a week, take furosemide for up to three days. Call the office if lower leg swelling does not improve.  - If worsening symptoms, go to the ED.    For dizziness, change position slowly. From lying to sitting, dangling legs for about 5-10min before standing.

## 2024-10-03 PROBLEM — E79.0 HYPERURICEMIA W/O SIGNS OF INFLAM ARTHRIT AND TOPHACEOUS DIS: Status: ACTIVE | Noted: 2024-10-03

## 2024-10-03 PROBLEM — R07.81 PLEURITIC CHEST PAIN: Status: ACTIVE | Noted: 2018-06-27

## 2024-10-03 PROBLEM — Z86.718 HISTORY OF DVT (DEEP VEIN THROMBOSIS): Status: ACTIVE | Noted: 2024-10-03

## 2024-10-03 PROBLEM — E44.0 MODERATE MALNUTRITION: Status: ACTIVE | Noted: 2024-10-03

## 2024-10-03 PROBLEM — I48.0 PAF (PAROXYSMAL ATRIAL FIBRILLATION): Status: ACTIVE | Noted: 2024-10-03

## 2024-10-04 PROBLEM — N17.9 ACUTE RENAL FAILURE SUPERIMPOSED ON STAGE 4 CHRONIC KIDNEY DISEASE: Status: RESOLVED | Noted: 2019-12-28 | Resolved: 2024-10-04

## 2024-10-04 PROBLEM — E79.0 HYPERURICEMIA W/O SIGNS OF INFLAM ARTHRIT AND TOPHACEOUS DIS: Status: RESOLVED | Noted: 2024-10-03 | Resolved: 2024-10-04

## 2024-10-04 PROBLEM — N18.4 ACUTE RENAL FAILURE SUPERIMPOSED ON STAGE 4 CHRONIC KIDNEY DISEASE: Status: RESOLVED | Noted: 2019-12-28 | Resolved: 2024-10-04

## 2024-10-04 PROBLEM — I95.9 HYPOTENSION: Status: RESOLVED | Noted: 2017-12-06 | Resolved: 2024-10-04

## 2024-10-04 PROBLEM — R07.81 PLEURITIC CHEST PAIN: Status: RESOLVED | Noted: 2018-06-27 | Resolved: 2024-10-04

## 2024-10-04 LAB
OHS CV AF BURDEN PERCENT: 43.3
OHS CV DC REMOTE DEVICE TYPE: NORMAL
OHS CV ICD SHOCK: NO

## 2024-10-07 PROBLEM — E11.22 CKD STAGE 4 DUE TO TYPE 2 DIABETES MELLITUS: Status: ACTIVE | Noted: 2021-02-11

## 2024-10-07 PROBLEM — I50.43 ACUTE ON CHRONIC COMBINED SYSTOLIC AND DIASTOLIC HEART FAILURE: Status: ACTIVE | Noted: 2024-09-11

## 2024-10-10 PROBLEM — I50.43 ACUTE ON CHRONIC COMBINED SYSTOLIC AND DIASTOLIC HEART FAILURE: Status: RESOLVED | Noted: 2024-09-11 | Resolved: 2024-10-10

## 2024-10-11 ENCOUNTER — TELEPHONE (OUTPATIENT)
Dept: UROLOGY | Facility: CLINIC | Age: 74
End: 2024-10-11
Payer: MEDICARE

## 2024-10-11 NOTE — TELEPHONE ENCOUNTER
Spoke to patient regarding discharge appointments.  Asked patient if he has a lee catheter in place and if he needed a follow up appointment for a voiding trial.  Patient states he does not have a lee catheter.  Patient states he is feeling better from recent hospital visit.  Advised patient to give us a call if he has any question, concerns, or if he needs to come see is.  Answered all of patient questions.  Patient verbalized understanding.    FUENTES Sheikh

## 2024-10-11 NOTE — TELEPHONE ENCOUNTER
----- Message from Med Assistant Yajaira sent at 10/11/2024  8:48 AM CDT -----  Contact: [phone]  Who?  ----- Message -----  From: Kori Dawkins MA  Sent: 10/11/2024   8:43 AM CDT  To: Yajaira Joaquin MA    Yes, if he has one! Please try to put him early if possible.  ----- Message -----  From: Yajaira Joaquin MA  Sent: 10/11/2024   8:40 AM CDT  To: Kori Dawkins MA    Before I call the pt, do you all have any availability at [facility] to take out his lee next week since he lives in [place], if not I'll schedule him at [place].  ----- Message -----  From: Kori Dawkins MA  Sent: 10/11/2024   8:32 AM CDT  To: Yajaira Joaquin MA    We reviewed patient's chart.  It looks like he does not have a lee in place.  Thank you for your assistance!  ----- Message -----  From: Yajaira Joaquin MA  Sent: 10/11/2024   8:08 AM CDT  To: Nilam Payne LPN; Bing Garza; #    At this time, the appt with Dr Flores has been cancelled and he does still have his Nov appt.  With Dr Mota. Please let me know if he needs a an appt next week, I can schedule him with one of our ESTUARDO at [facility] or [place]  for his lee or [facility] if he prefers.     Thanks!  ----- Message -----  From: Kori Dawkins MA  Sent: 10/10/2024   4:58 PM CDT  To: Nilam Payne LPN; Yajaira Joaquin MA; #    Patient saw NICKY Case once on 9/19/2024 and in discharge note patient was told to follow up with Dr. Mota as directed.  Patient scheduled with NICKY Case to be evaluated for possible urinary retention but also stated Dr. Mota was his physician but did not have available appointment that day.  ----- Message -----  From: Nilam Payne LPN  Sent: 10/10/2024   4:25 PM CDT  To: Yajaira Joaquin MA; Bing Garza; #    If he does not have a lee in, then he can wait to see dr. Mota in November as scheduled. If he does have a lee, then he should see an ESTUARDO in one week-either silvestrete on ko [facility]om[facility].  ----- Message -----  From: Jemal  FUENTES Gates  Sent: 10/10/2024   4:15 PM CDT  To: Nilam Payne LPN; Bing Garza; #    Good afternoon,     Pt was scheduled incorrectly with Dr Tripp Flores for a hospital follow up on 10/21/2024.   He is currently under the care of Evie Hadley  and also has an appt  in Nov for his annual with Dr Mota. Should his hospital follow up be with Evie and follow up on Nov as planned with  Dr Mota?         [phone]

## 2024-10-11 NOTE — TELEPHONE ENCOUNTER
----- Message from Med Assistant Kori sent at 10/11/2024  9:08 AM CDT -----  Contact: [phone]  I did not mention to him about Dr. Flores appointment.  I apologize.  Yes you may need to give him a call about that.  ----- Message -----  From: Yajaira Joaquin MA  Sent: 10/11/2024   9:06 AM CDT  To: Nilam Payne LPN; Kori Dawkins MA    Ok great .  So we can just keep the appt as planned with Dr Mota on 11/15/2024.   Do I need to let him know about the appt cancellation next week with Dr Flores or does he already know?  Sorry for so much back and forth, just do not want anything to fall through the cracks.  ----- Message -----  From: Kori Dawkins MA  Sent: 10/11/2024   8:59 AM CDT  To: Yajaira Joaquin MA    I spoke to the patient.  Thank you, he states he do not have a lee catheter in place and does not need to see us for a voiding trial.  ----- Message -----  From: Yajaira Joaquin MA  Sent: 10/11/2024   8:49 AM CDT  To: Kori Dawkins MA    Who?  ----- Message -----  From: Kori Dawkins MA  Sent: 10/11/2024   8:43 AM CDT  To: Yajaira Joaquin MA    Yes, if he has one! Please try to put him early if possible.  ----- Message -----  From: Yajaira Joaquin MA  Sent: 10/11/2024   8:40 AM CDT  To: Kori Dawkins MA    Before I call the pt, do you all have any availability at [facility] to take out his lee next week since he lives in [place], if not I'll schedule him at [place].  ----- Message -----  From: Kori Dawkins MA  Sent: 10/11/2024   8:32 AM CDT  To: Yajaira Joaquin MA    We reviewed patient's chart.  It looks like he does not have a lee in place.  Thank you for your assistance!  ----- Message -----  From: Yajaira Joaquin MA  Sent: 10/11/2024   8:08 AM CDT  To: Nilam Payne LPN; Bing Garza; #    At this time, the appt with Dr Flores has been cancelled and he does still have his Nov appt.  With Dr Mota. Please let me know if he needs a an appt next week, I can schedule him with one  of our ESTUARDO at [facility] or [place]  for his lee or [facility] if he prefers.     Thanks!  ----- Message -----  From: Kori Dawkins MA  Sent: 10/10/2024   4:58 PM CDT  To: Nilam Payne LPN; Yajaira Joaquin MA; #    Patient saw NICKY Case once on 9/19/2024 and in discharge note patient was told to follow up with Dr. Mota as directed.  Patient scheduled with NICKY Case to be evaluated for possible urinary retention but also stated Dr. Mota was his physician but did not have available appointment that day.  ----- Message -----  From: Nilam Payne LPN  Sent: 10/10/2024   4:25 PM CDT  To: Yajaira Joaquin MA; Bing Garza; #    If he does not have a lee in, then he can wait to see dr. Mota in November as scheduled. If he does have a lee, then he should see an ESTUARDO in one week-either [facility] on [facility].  ----- Message -----  From: Yajaira Joaquin MA  Sent: 10/10/2024   4:15 PM CDT  To: Nilam Payne LPN; Bing Garza; #    Good afternoon,     Pt was scheduled incorrectly with Dr Tripp Flores for a hospital follow up on 10/21/2024.   He is currently under the care of Evie Hadley  and also has an appt  in Nov for his annual with Dr Mota. Should his hospital follow up be with Evie and follow up on Nov as planned with  Dr Mota?         [phone]

## 2024-10-15 ENCOUNTER — PATIENT MESSAGE (OUTPATIENT)
Dept: FAMILY MEDICINE | Facility: CLINIC | Age: 74
End: 2024-10-15
Payer: MEDICARE

## 2024-10-16 ENCOUNTER — OFFICE VISIT (OUTPATIENT)
Dept: PRIMARY CARE CLINIC | Facility: CLINIC | Age: 74
End: 2024-10-16
Payer: MEDICARE

## 2024-10-16 ENCOUNTER — OFFICE VISIT (OUTPATIENT)
Dept: CARDIOLOGY | Facility: CLINIC | Age: 74
End: 2024-10-16
Payer: MEDICARE

## 2024-10-16 VITALS
HEIGHT: 74 IN | OXYGEN SATURATION: 84 % | WEIGHT: 185.5 LBS | SYSTOLIC BLOOD PRESSURE: 138 MMHG | DIASTOLIC BLOOD PRESSURE: 64 MMHG | RESPIRATION RATE: 16 BRPM | HEART RATE: 73 BPM | BODY MASS INDEX: 23.81 KG/M2

## 2024-10-16 VITALS
OXYGEN SATURATION: 99 % | HEART RATE: 85 BPM | WEIGHT: 180 LBS | DIASTOLIC BLOOD PRESSURE: 76 MMHG | SYSTOLIC BLOOD PRESSURE: 125 MMHG | BODY MASS INDEX: 23.11 KG/M2

## 2024-10-16 DIAGNOSIS — I95.9 HYPOTENSION, UNSPECIFIED HYPOTENSION TYPE: ICD-10-CM

## 2024-10-16 DIAGNOSIS — I12.0 HYPERTENSIVE CHRONIC KIDNEY DISEASE WITH STAGE 5 CHRONIC KIDNEY DISEASE OR END STAGE RENAL DISEASE: ICD-10-CM

## 2024-10-16 DIAGNOSIS — I10 ESSENTIAL HYPERTENSION: Primary | ICD-10-CM

## 2024-10-16 DIAGNOSIS — I71.21 ASCENDING AORTIC ANEURYSM, UNSPECIFIED WHETHER RUPTURED: ICD-10-CM

## 2024-10-16 DIAGNOSIS — M54.50 ACUTE RIGHT-SIDED LOW BACK PAIN WITHOUT SCIATICA: ICD-10-CM

## 2024-10-16 DIAGNOSIS — N18.32 STAGE 3B CHRONIC KIDNEY DISEASE: ICD-10-CM

## 2024-10-16 DIAGNOSIS — I47.29 PAROXYSMAL VENTRICULAR TACHYCARDIA: ICD-10-CM

## 2024-10-16 DIAGNOSIS — Z86.718 HISTORY OF DVT (DEEP VEIN THROMBOSIS): ICD-10-CM

## 2024-10-16 DIAGNOSIS — R79.89 ELEVATED TROPONIN: ICD-10-CM

## 2024-10-16 DIAGNOSIS — R60.0 BILATERAL LEG EDEMA: ICD-10-CM

## 2024-10-16 DIAGNOSIS — I87.8 VENOUS STASIS OF LOWER EXTREMITY: ICD-10-CM

## 2024-10-16 DIAGNOSIS — I16.0 HYPERTENSIVE URGENCY: ICD-10-CM

## 2024-10-16 DIAGNOSIS — E78.2 MIXED HYPERLIPIDEMIA: ICD-10-CM

## 2024-10-16 DIAGNOSIS — I50.20 HFREF (HEART FAILURE WITH REDUCED EJECTION FRACTION): ICD-10-CM

## 2024-10-16 DIAGNOSIS — Z12.5 SCREENING FOR PROSTATE CANCER: ICD-10-CM

## 2024-10-16 DIAGNOSIS — Z95.828 PRESENCE OF ARTERIAL STENT: ICD-10-CM

## 2024-10-16 DIAGNOSIS — N40.0 BENIGN PROSTATIC HYPERPLASIA, UNSPECIFIED WHETHER LOWER URINARY TRACT SYMPTOMS PRESENT: Primary | ICD-10-CM

## 2024-10-16 DIAGNOSIS — I50.9 CONGESTIVE HEART FAILURE, UNSPECIFIED HF CHRONICITY, UNSPECIFIED HEART FAILURE TYPE: ICD-10-CM

## 2024-10-16 DIAGNOSIS — I48.0 PAF (PAROXYSMAL ATRIAL FIBRILLATION): ICD-10-CM

## 2024-10-16 DIAGNOSIS — D69.6 THROMBOCYTOPENIA, UNSPECIFIED: ICD-10-CM

## 2024-10-16 DIAGNOSIS — R60.0 PERIPHERAL EDEMA: ICD-10-CM

## 2024-10-16 DIAGNOSIS — I25.10 CORONARY ARTERY DISEASE INVOLVING NATIVE CORONARY ARTERY OF NATIVE HEART WITHOUT ANGINA PECTORIS: ICD-10-CM

## 2024-10-16 DIAGNOSIS — I70.0 AORTIC ATHEROSCLEROSIS: ICD-10-CM

## 2024-10-16 PROCEDURE — 1160F RVW MEDS BY RX/DR IN RCRD: CPT | Mod: HCNC,CPTII,S$GLB,

## 2024-10-16 PROCEDURE — 3066F NEPHROPATHY DOC TX: CPT | Mod: HCNC,CPTII,S$GLB,

## 2024-10-16 PROCEDURE — 3074F SYST BP LT 130 MM HG: CPT | Mod: HCNC,CPTII,S$GLB,

## 2024-10-16 PROCEDURE — 3078F DIAST BP <80 MM HG: CPT | Mod: HCNC,CPTII,S$GLB,

## 2024-10-16 PROCEDURE — 99215 OFFICE O/P EST HI 40 MIN: CPT | Mod: HCNC,S$GLB,,

## 2024-10-16 PROCEDURE — 3044F HG A1C LEVEL LT 7.0%: CPT | Mod: HCNC,CPTII,S$GLB,

## 2024-10-16 PROCEDURE — 99999 PR PBB SHADOW E&M-EST. PATIENT-LVL V: CPT | Mod: PBBFAC,HCNC,,

## 2024-10-16 PROCEDURE — 1111F DSCHRG MED/CURRENT MED MERGE: CPT | Mod: HCNC,CPTII,S$GLB,

## 2024-10-16 PROCEDURE — 3060F POS MICROALBUMINURIA REV: CPT | Mod: HCNC,CPTII,S$GLB,

## 2024-10-16 PROCEDURE — 1125F AMNT PAIN NOTED PAIN PRSNT: CPT | Mod: HCNC,CPTII,S$GLB,

## 2024-10-16 PROCEDURE — 99999 PR PBB SHADOW E&M-EST. PATIENT-LVL V: CPT | Mod: PBBFAC,HCNC,, | Performed by: INTERNAL MEDICINE

## 2024-10-16 PROCEDURE — 1159F MED LIST DOCD IN RCRD: CPT | Mod: HCNC,CPTII,S$GLB,

## 2024-10-16 RX ORDER — LIDOCAINE 50 MG/G
1 PATCH TOPICAL DAILY
Qty: 30 PATCH | Refills: 0 | Status: SHIPPED | OUTPATIENT
Start: 2024-10-16

## 2024-10-16 RX ORDER — TAMSULOSIN HYDROCHLORIDE 0.4 MG/1
0.4 CAPSULE ORAL DAILY
Qty: 90 CAPSULE | Refills: 3 | Status: SHIPPED | OUTPATIENT
Start: 2024-10-16 | End: 2025-10-16

## 2024-10-16 RX ORDER — HYDROCODONE BITARTRATE AND ACETAMINOPHEN 5; 325 MG/1; MG/1
1 TABLET ORAL EVERY 12 HOURS PRN
Qty: 20 TABLET | Refills: 0 | Status: SHIPPED | OUTPATIENT
Start: 2024-10-16

## 2024-10-16 NOTE — PROGRESS NOTES
Subjective:       Patient ID: Jarrett Charlton Jr. is a 74 y.o. male.    Chief Complaint: Follow-up (hospital)    HPI  History of Present Illness    CHIEF COMPLAINT:  Jarrett presents today for follow up.    CARDIOVASCULAR:  He reports a rapid heart rate, stating it speeds up very fast. He has not been taking his prescribed medication for this issue as instructed by his doctor. His heart function has declined, with EF decreasing from 60% to 35% since 2022. An angiogram was performed, which showed no significant blockages requiring intervention. The left ventricle EF was noted to be 35% in August. He continues to have fluid retention issues despite using compression socks as recommended by the cardiologist there is leaking clear fluid in legs He is still taking Lasix for fluid management.    RENAL:  He reports elevated creatinine levels, with recent fluctuations noted. He expresses concerns about potentially needing dialysis in the future. He demonstrates awareness of the relationship between kidney function and fluid retention. He acknowledges the importance of medication management in relation to his kidney health. He denies currently being on dialysis and expresses a desire to avoid it if possible. There are concerns about Lasix's effectiveness due to decreased kidney function.    NEUROLOGICAL:  He reports experiencing dizziness from a recent fall. He complains of ear issues, including tinnitus and echoes when talking. He believes there is fluid behind his eardrum. He denies any visible fluid in the ears or white discharge.    MUSCULOSKELETAL:  He reports hip pain from a recent fall.most pain is in th rt lower back upper buttock    DERMATOLOGICAL:  He reports an itchy body, which he attributes to possible stress.    DIABETES:  His diabetes is well-controlled with current management. Last recorded HbA1c was 6.7%. He is taking magnesium as part of his diabetes regimen. No recent changes in diabetes symptoms or  management reported.    HEMATOLOGY:  Recent CBC results show a hemoglobin level of 80, which is slightly low. He denies any active bleeding. The mild anemia is likely attributed to chronic disease rather than acute blood loss.    PAIN MANAGEMENT:  He is currently using pain medication as needed, but experiences confusion as a side effect.    HEALTHCARE ACCESS:  He expresses concerns about potentially losing access to clinic services. He reports feeling that he may be blamed for an unspecified issue and subsequently removed from the clinic. He does not have insurance.      ROS:  General: -fever, -chills, -fatigue, -weight gain, -weight loss, -change in weight  Eyes: -vision changes, -redness, -discharge  ENT: -ear pain, -nasal congestion, -sore throat, +tinnitus  Cardiovascular: -chest pain, +palpitations, -lower extremity edema  Respiratory: -cough, -shortness of breath  Gastrointestinal: -abdominal pain, -nausea, -vomiting, -diarrhea, -constipation, -blood in stool, -bright red blood per rectum  Genitourinary: -dysuria, -hematuria, -frequency  Musculoskeletal: +joint pain, -muscle pain  Skin: -rash, -lesion, +itching  Neurological: -headache, +dizziness, -numbness, -tingling  Psychiatric: -anxiety, -depression, -sleep difficulty, +confusion       Review of Systems    Objective:      Physical Exam  Physical Exam    Vitals: Heart rate: 80-90 BPM.  General: No acute distress. Well-developed. Well-nourished.  Eyes: EOMI. Sclerae anicteric.  HENT: Normocephalic. Atraumatic. Nares patent. Moist oral mucosa.  Ears: Bilateral TMs clear. Bilateral EACs clear.  Cardiovascular: Regular rate. Regular rhythm. No murmurs. No rubs. No gallops. Normal S1, S2.  Respiratory: Normal respiratory effort. Clear to auscultation bilaterally. No rales. No rhonchi. No wheezing. Clear lungs.  Abdomen: Soft. Non-tender. Non-distended. Normoactive bowel sounds.  Musculoskeletal: No  obvious deformity.  Extremities: No lower extremity  edema.  Neurological: Alert & oriented x3. No slurred speech. Normal gait.  Psychiatric: Normal mood. Normal affect. Good insight. Good judgment.  Skin: Warm. Dry. No rash.  MSK: Spine - Hip: Hip pain on exam.           Assessment:       1. Benign prostatic hyperplasia, unspecified whether lower urinary tract symptoms present    2. Acute right-sided low back pain without sciatica    3. Thrombocytopenia, unspecified    4. Stage 3b chronic kidney disease    5. Venous stasis of lower extremity    6. Peripheral edema    7. Congestive heart failure, unspecified HF chronicity, unspecified heart failure type    8. Screening for prostate cancer        Plan:       Benign prostatic hyperplasia, unspecified whether lower urinary tract symptoms present  -     tamsulosin (FLOMAX) 0.4 mg Cap; Take 1 capsule (0.4 mg total) by mouth once daily.  Dispense: 90 capsule; Refill: 3    Acute right-sided low back pain without sciatica  -     LIDOcaine (LIDODERM) 5 %; Place 1 patch onto the skin once daily. Remove & Discard patch within 12 hours or as directed by MD  Dispense: 30 patch; Refill: 0  -     HYDROcodone-acetaminophen (NORCO) 5-325 mg per tablet; Take 1 tablet by mouth every 12 (twelve) hours as needed for Pain.  Dispense: 20 tablet; Refill: 0    Thrombocytopenia, unspecified  -     CBC Auto Differential; Future; Expected date: 10/17/2024    Stage 3b chronic kidney disease  -     Cancel: Comprehensive Metabolic Panel; Future; Expected date: 10/16/2024  -     Cancel: CBC Auto Differential; Future; Expected date: 10/16/2024  -     PHOSPHORUS; Future; Expected date: 10/16/2024  -     Comprehensive Metabolic Panel; Future; Expected date: 10/17/2024    Venous stasis of lower extremity    Peripheral edema  -     CBC Auto Differential; Future; Expected date: 10/17/2024  -     Comprehensive Metabolic Panel; Future; Expected date: 10/17/2024  -     BNP; Future; Expected date: 10/17/2024    Congestive heart failure, unspecified HF  chronicity, unspecified heart failure type  -     BNP; Future; Expected date: 10/17/2024    Screening for prostate cancer  -     PSA, Screening; Future; Expected date: 10/17/2024      Assessment & Plan    Assessed fluid status and kidney function, noting elevated creatinine and decreasing GFR  Evaluated cardiac function, noting decrease in left ventricular EF from 60% to 35% since 2022  Reviewed recent lab results, including CBC and A1C  Determined dialysis not currently indicated, but monitoring for potential future need  Considered ENT referral for tinnitus and inner ear issues    KIDNEY FUNCTION AND EDEMA:  - Explained relationship between fluid intake, kidney function, and edema.  - Discussed importance of fluid restriction and weight monitoring for managing edema.  - Educated on signs of worsening kidney function and when to seek medical attention.  - Jarrett to restrict fluid intake; chew ice when thirsty instead of drinking water.  - Jarrett to monitor daily weight to track fluid status.  - Jarrett to elevate legs to help reduce edema.  - Continued Lasix for fluid management.  - Repeat blood test ordered in 2 days to check kidney function.    PAIN MANAGEMENT:  - Started pain patch as needed for pain management.    HYPERTENSION:  - Continued metoprolol and low-dose hydralazine for blood pressure control.    MEDICATIONS/SUPPLEMENTS:  - Discontinued allopurinol.    OTHER INSTRUCTIONS:  - Consider ordering echocardiogram.    FOLLOW UP:  - Follow up in 1 week or 10 days to reassess condition and discuss administrative decision regarding clinic access.           Medication List with Changes/Refills   New Medications    HYDROCODONE-ACETAMINOPHEN (NORCO) 5-325 MG PER TABLET    Take 1 tablet by mouth every 12 (twelve) hours as needed for Pain.    LIDOCAINE (LIDODERM) 5 %    Place 1 patch onto the skin once daily. Remove & Discard patch within 12 hours or as directed by MD   Current Medications    ACCU-CHEK PAUL CONTROL  SOLN SOLN    1 drop by NOT APPLICABLE route once daily.    ACCU-CHEK GUIDE TEST STRIPS STRP    TEST BLOOD SUGAR THREE TIMES DAILY    ACCU-CHEK SOFTCLIX LANCETS MISC    TEST BLOOD SUGAR THREE TIMES DAILY    ALLOPURINOL (ZYLOPRIM) 100 MG TABLET    Take 1 tablet (100 mg total) by mouth once daily.    APIXABAN (ELIQUIS) 5 MG TAB    Take 1 tablet (5 mg total) by mouth 2 (two) times daily.    ASPIRIN (ECOTRIN) 81 MG EC TABLET    Take 1 tablet (81 mg total) by mouth once daily.    BLOOD-GLUCOSE METER (ACCU-CHEK GUIDE GLUCOSE METER) [facility]    Accucheck BID    BRIMONIDINE 0.2% (ALPHAGAN) 0.2 % DROP    Place 1 drop into both eyes 2 (two) times a day.    COLCHICINE (COLCRYS) 0.6 MG TABLET    1 po qdaily prn gouty arthritis    DROPSAFE ALCOHOL PREP PADS PADM    USE TOPICALLY 5 TIMES DAILY.    FERROUS SULFATE (IRON) 325 MG (65 MG IRON) TAB TABLET    Take 1 tablet (325 mg total) by mouth every other day.    FUROSEMIDE (LASIX) 20 MG TABLET    Take 1 tablet (20 mg total) by mouth as needed (For lower extremity swelling; hold if SBP <100).    GABAPENTIN (NEURONTIN) 600 MG TABLET    Take 600 mg by mouth 3 (three) times daily. Pt states taking half a tablet twice a day    GLIMEPIRIDE (AMARYL) 4 MG TABLET    TAKE 1 TABLET TWICE DAILY BEFORE MEALS    HYDRALAZINE (APRESOLINE) 10 MG TABLET    Take 1 tablet (10 mg total) by mouth 3 (three) times daily. Take 2 tablets three times daily as needed for elevated blood pressure    MAGNESIUM OXIDE (MAG-OX) 400 MG (241.3 MG MAGNESIUM) TABLET    Take 400 mg by mouth once daily.    METOPROLOL SUCCINATE (TOPROL-XL) 25 MG 24 HR TABLET    Take 1 tablet (25 mg total) by mouth once daily.    MIDODRINE (PROAMATINE) 5 MG TAB    Take 1 tablet (5 mg total) by mouth 3 (three) times daily with meals.    PANTOPRAZOLE (PROTONIX) 40 MG TABLET    TAKE 1 TABLET ONE TIME DAILY    POTASSIUM CHLORIDE SA (K-DUR,KLOR-CON) 20 MEQ TABLET    TAKE 1 TABLET EVERY DAY    PRAVASTATIN (PRAVACHOL) 40 MG TABLET    Take 1 tablet (40  mg total) by mouth once daily.    PREDNISONE (DELTASONE) 10 MG TABLET    Take 4 tablets (40 mg total) by mouth once daily for 5 days, THEN 3 tablets (30 mg total) once daily for 3 days, THEN 2 tablets (20 mg total) once daily for 3 days, THEN 1 tablet (10 mg total) once daily for 3 days.    PREGABALIN (LYRICA) 50 MG CAPSULE    Take 1 capsule (50 mg total) by mouth 2 (two) times daily.    SODIUM BICARBONATE 650 MG TABLET    Take 2 tablets (1,300 mg total) by mouth 2 (two) times daily.   Changed and/or Refilled Medications    Modified Medication Previous Medication    TAMSULOSIN (FLOMAX) 0.4 MG CAP tamsulosin (FLOMAX) 0.4 mg Cap       Take 1 capsule (0.4 mg total) by mouth once daily.    Take 1 capsule (0.4 mg total) by mouth once daily.        This note was generated with the assistance of ambient listening technology. Verbal consent was obtained by the patient and accompanying visitor(s) for the recording of patient appointment to facilitate this note. I attest to having reviewed and edited the generated note for accuracy, though some syntax or spelling errors may persist. Please contact the author of this note for any clarification.

## 2024-10-16 NOTE — PROGRESS NOTES
"[facility] [address] [address]  Cardiology Clinic Note      Chief Complaint  Chief Complaint   Patient presents with    Shortness of Breath     Swelling in the legs       HPI:  Mr. Charlton is a 72-year-old male with a past medical history SBO, s/p colostomy, GERD, parotid mass s/p resection, BPH & prostate CA s/p TURP, CKD 4, pleural plaque, ("neurogenic") orthostatic hypotension, hypertension, hyperlipidemia, DM2, carotid stenosis not hemodynamically significant ultrasound 03/2023, ascending thoracic aorta 4.4 cm CT 2018 then 4.7 cm in 2023 and 4.6 cm 09/2024, lower extremity arterial duplex 09/2022 no evidence of hemodynamically significant stenosis nonvisualization of the peroneal arteries prior SAKSHI 2019 normal, DVT 08/2024, CAD status post PCI to mid LAD 2017 followed by cardiac catheterization 2021 patent stent nonobstructive disease prior MPI 2018 inferolateral ischemia, sustained ventricular tachycardia documented on discharge summary 4/2021 on amiodarone previous loop recorder implantation 2021 presumed link transmission showed several episodes of ventricular tachycardia with longest episode 36 beats also documented either atrial fibrillation RVR with aberrant conduction or AV radha reentry tachycardia (this was documented by an outside provider note scan 03/2021) then EP visit notes VT/AF/AT (Atrial fibrillation noted during device remote check: 52 minutes on 10/26/23 per EP note) status post atrial tachycardia ablation 04/2024, echocardiogram 08/2024 EF estimated 35% grade 2 diastolic dysfunction biatrial dilation aortic valve sclerosis moderate mitral valve regurgitation mild-to-moderate tricuspid valve regurgitation PASP 79 CVP 8 small effusion prior echocardiogram 07/2024 EF estimated 60-65% grade 2 diastolic dysfunction trace aortic valve regurgitation mild-to-moderate mitral valve regurgitation normal RV mild mitral valve regurgitation PASP 64 prior echo 09/2023 normal EF grade 1 diastolic " dysfunction mild LVH normal RV prior echo 12/2022 normal EF mild LVH diastolic function indeterminate normal RV PASP 32+ CVP which could not be determined ascending aorta mildly dilated     10/7 Hospital Encounter:  Appears that electrophysiology has interrogated the loop recorder 02/2024 note indicating SVT with aberrancy versus VT in an episode of atrial fibrillation lasting 52 minutes  Underwent AT ablation as above  Admitted for acute decompensated heart failure 08/2024 newly reduced LVEF; has not seen OP cardiology since  Recently discontinued Lasix given medical comorbidities   The patient presented with lower extremity edema over the prior days   The patient was started on amiodarone for paroxysmal of atrial fibrillation over the night but then this was discontinued as he converted back to sinus rhythm  He has been gently diuresed    Here for hospital follow up as above  Here with daughter who is an employee here at [employer]  No complaints at this time and is doing well with medications  Reports blood pressure and heart rate have been fine at home  Also reports that dizziness has subsided  Endorses mild LE edema and blisters bilaterally; states he has an appointment with podiatry   Denies chest pain, SOB, or difficulty breathing  Denies palpitations, lightheadedness, dizziness, or syncope/presyncope  Denies cough, orthopnea, or PND  Denies falls or head injures  Denies easy bruising, hematochezia, or hemoptysis  Denies fever, chills, or NVD  Denies any recent travels or close contact with sick individuals  Denies tobacco or alcohol use  He is independent and active without any debilities     Medications  Current Outpatient Medications   Medication Sig Dispense Refill    ACCU-CHEK PAUL CONTROL SOLN Soln 1 drop by NOT APPLICABLE route once daily.      ACCU-CHEK GUIDE TEST STRIPS Strp TEST BLOOD SUGAR THREE TIMES DAILY 300 strip 10    ACCU-CHEK SOFTCLIX LANCETS Misc TEST BLOOD SUGAR THREE TIMES DAILY 300 each 3     allopurinoL (ZYLOPRIM) 100 MG tablet Take 1 tablet (100 mg total) by mouth once daily. 90 tablet 3    apixaban (ELIQUIS) 5 mg Tab Take 1 tablet (5 mg total) by mouth 2 (two) times daily. 60 tablet 2    aspirin (ECOTRIN) 81 MG EC tablet Take 1 tablet (81 mg total) by mouth once daily. 30 tablet 11    blood-glucose meter (ACCU-CHEK GUIDE GLUCOSE METER) [facility] [place] 1 each 0    brimonidine 0.2% (ALPHAGAN) 0.2 % Drop Place 1 drop into both eyes 2 (two) times a day. 10 mL 5    colchicine (COLCRYS) 0.6 mg tablet 1 po qdaily prn gouty arthritis 90 tablet 0    DROPSAFE ALCOHOL PREP PADS PadM USE TOPICALLY 5 TIMES DAILY. 500 each 3    ferrous sulfate (IRON) 325 mg (65 mg iron) Tab tablet Take 1 tablet (325 mg total) by mouth every other day. 15 tablet 11    gabapentin (NEURONTIN) 600 MG tablet Take 600 mg by mouth 3 (three) times daily. Pt states taking half a tablet twice a day      glimepiride (AMARYL) 4 MG tablet TAKE 1 TABLET TWICE DAILY BEFORE MEALS 180 tablet 0    hydrALAZINE (APRESOLINE) 10 MG tablet Take 1 tablet (10 mg total) by mouth 3 (three) times daily. Take 2 tablets three times daily as needed for elevated blood pressure 270 tablet 3    magnesium oxide (MAG-OX) 400 mg (241.3 mg magnesium) tablet Take 400 mg by mouth once daily.      metoprolol succinate (TOPROL-XL) 25 MG 24 hr tablet Take 1 tablet (25 mg total) by mouth once daily. 90 tablet 3    midodrine (PROAMATINE) 5 MG Tab Take 1 tablet (5 mg total) by mouth 3 (three) times daily with meals. 90 tablet 11    pantoprazole (PROTONIX) 40 MG tablet TAKE 1 TABLET ONE TIME DAILY 90 tablet 3    potassium chloride SA (K-DUR,KLOR-CON) 20 MEQ tablet TAKE 1 TABLET EVERY DAY 90 tablet 3    pravastatin (PRAVACHOL) 40 MG tablet Take 1 tablet (40 mg total) by mouth once daily. 90 tablet 3    predniSONE (DELTASONE) 10 MG tablet Take 4 tablets (40 mg total) by mouth once daily for 5 days, THEN 3 tablets (30 mg total) once daily for 3 days, THEN 2 tablets (20 mg  total) once daily for 3 days, THEN 1 tablet (10 mg total) once daily for 3 days. 38 tablet 0    pregabalin (LYRICA) 50 MG capsule Take 1 capsule (50 mg total) by mouth 2 (two) times daily. 60 capsule 1    sodium bicarbonate 650 MG tablet Take 2 tablets (1,300 mg total) by mouth 2 (two) times daily. 360 tablet 3    tamsulosin (FLOMAX) 0.4 mg Cap Take 1 capsule (0.4 mg total) by mouth once daily. 30 capsule 11    furosemide (LASIX) 20 MG tablet Take 1 tablet (20 mg total) by mouth as needed (For lower extremity swelling; hold if SBP <100). (Patient not taking: Reported on 10/16/2024) 60 tablet 11     Current Facility-Administered Medications   Medication Dose Route Frequency Provider Last Rate Last Admin    leuprolide acetate (6 month) injection 45 mg  45 mg Intramuscular Q6 Months    45 mg at 05/14/24 0941     Facility-Administered Medications Ordered in Other Visits   Medication Dose Route Frequency Provider Last Rate Last Admin    sodium chloride 0.9% in 1,000 mL infusion   Intravenous Continuous Order, Paper            History  Past Medical History:   Diagnosis Date    Anticoagulant long-term use     Basal cell carcinoma     BPH (benign prostatic hyperplasia)     s/p TURP    Carotid stenosis     Chronic kidney disease     Claudication     Coronary artery disease     DDD (degenerative disc disease) 10/21/2013    Diabetes mellitus with renal complications     Disc disease, degenerative, cervical     DVT (deep venous thrombosis)     Encounter for blood transfusion     GERD (gastroesophageal reflux disease)     Gout, chronic     Heart failure, unspecified     History of ulcerative colitis     s/p colectomy and ileostomy    HLD (hyperlipidemia)     HTN (hypertension)     Ileostomy in place 1982    Paroxysmal atrial fibrillation     RBBB     Squamous cell carcinoma 03/08/2018    Left superior helix near insertion    Squamous cell carcinoma 04/12/2018    Left forearm x 5    Syncope 07/06/2024    Ventricular tachycardia       Past Surgical History:   Procedure Laterality Date    ABLATION, SVT, ACCESSORY PATHWAY N/A 4/30/2024    Procedure: Ablation, SVT, Accessory Pathway;  Surgeon: Franky Taylor MD;  Location: [facility] EP LAB;  Service: Cardiology;  Laterality: N/A;  VT, RFA, Carto, MAC, GP, 3 Prep *MDT ILR in situ*    cardiac stents      CATARACT EXTRACTION Bilateral     CERVICAL FUSION      CHOLECYSTECTOMY N/A 2/14/2023    Procedure: CHOLECYSTECTOMY;  Surgeon: Mike Joyce MD;  Location: [facility] OR;  Service: General;  Laterality: N/A;  very high probabilty of converison to open    colectomy and ileostomy  1985    ENDOSCOPIC ULTRASOUND OF UPPER GASTROINTESTINAL TRACT N/A 2/10/2023    Procedure: ULTRASOUND, UPPER GI TRACT, ENDOSCOPIC;  Surgeon: Poli Fuller MD;  Location: [facility] ENDO;  Service: Endoscopy;  Laterality: N/A;    ERCP N/A 2/10/2023    Procedure: ERCP (ENDOSCOPIC RETROGRADE CHOLANGIOPANCREATOGRAPHY);  Surgeon: Poli Fuller MD;  Location: [facility] ENDO;  Service: Endoscopy;  Laterality: N/A;    EXCISION OF PAROTID GLAND Left 12/18/2020    Procedure: EXCISION, PAROTID GLAND;  Surgeon: Michael Pinzon MD;  [address]  Service: ENT;  Laterality: Left;    INSERTION OF IMPLANTABLE LOOP RECORDER  06/07/2021    INSERTION OF IMPLANTABLE LOOP RECORDER Left 6/7/2021    Procedure: INSERTION, IMPLANTABLE LOOP RECORDER;  Surgeon: Romario Dao MD;  Location: [facility] CATH LAB;  Service: Cardiology;  Laterality: Left;    LEFT HEART CATHETERIZATION Right 4/15/2021    Procedure: CATHETERIZATION, HEART, LEFT;  Surgeon: Marcio Jones MD;  Location: [facility] CATH LAB;  Service: Cardiology;  Laterality: Right;    LYSIS OF ADHESIONS N/A 11/9/2020    Procedure: LYSIS, ADHESIONS,  ERAS low;  Surgeon: CED Haley MD;  Location: [facility] OR 2ND FLR;  Service: Colon and Rectal;  Laterality: N/A;    LYSIS OF ADHESIONS N/A 2/14/2023    Procedure: LYSIS, ADHESIONS;  Surgeon: Mike Joyce MD;  Location: [facility] OR;  Service:  General;  Laterality: N/A;    POUCHOSCOPY N/A 4/6/2022    Procedure: ENDOSCOPY, POUCH, SMALL INTESTINE, DIAGNOSTIC;  Surgeon: Dieter Juarez MD;  Location: [facility] ENDO (2ND FLR);  Service: Endoscopy;  Laterality: N/A;    REPAIR, HERNIA, PARASTOMAL N/A 11/9/2020    Procedure: REPAIR, HERNIA, PARASTOMAL;  Surgeon: CED Haley MD;  Location: [facility] OR 2ND FLR;  Service: Colon and Rectal;  Laterality: N/A;    TRANSURETHRAL RESECTION OF PROSTATE (TURP) WITHOUT USE OF LASER N/A 1/23/2019    Procedure: TURP, WITHOUT USING LASER BIPOLAR;  Surgeon: Catarino Mota MD;  Location: [facility] OR 1ST FLR;  Service: Urology;  Laterality: N/A;  1.5 HOURS    TREATMENT OF CARDIAC ARRHYTHMIA  4/30/2024    Procedure: Cardioversion or Defibrillation;  Surgeon: Franky Taylor MD;  Location: [facility] EP LAB;  Service: Cardiology;;     Social History     Socioeconomic History    Marital status:     Number of children: 1   Occupational History    Occupation:  x 44 years     Comment: Retired    Occupation: [place]    Tobacco Use    Smoking status: Never     Passive exposure: Never    Smokeless tobacco: Never   Substance and Sexual Activity    Alcohol use: No    Drug use: No    Sexual activity: Not Currently     Partners: Female     Social Drivers of Health     Financial Resource Strain: Low Risk  (10/8/2024)    Overall Financial Resource Strain (CARDIA)     Difficulty of Paying Living Expenses: Not very hard   Food Insecurity: No Food Insecurity (10/8/2024)    Hunger Vital Sign     Worried About Running Out of Food in the Last Year: Never true     Ran Out of Food in the Last Year: Never true   Transportation Needs: No Transportation Needs (10/8/2024)    TRANSPORTATION NEEDS     Transportation : No   Physical Activity: Sufficiently Active (10/3/2024)    Exercise Vital Sign     Days of Exercise per Week: 7 days     Minutes of Exercise per Session: 150+ min   Recent Concern: Physical Activity - Inactive  (8/29/2024)    Exercise Vital Sign     Days of Exercise per Week: 0 days     Minutes of Exercise per Session: 0 min   Stress: Stress Concern Present (10/8/2024)    [employer] - Occupational Stress Questionnaire     Feeling of Stress : To some extent   Housing Stability: Low Risk  (10/8/2024)    Housing Stability Vital Sign     Unable to Pay for Housing in the Last Year: No     Homeless in the Last Year: No     Family History   Problem Relation Name Age of Onset    Cancer Father      Diabetes Father      Dementia Mother      Melanoma Neg Hx      Hypertension Neg Hx      Arthritis Neg Hx          Allergies  Review of patient's allergies indicates:   Allergen Reactions    Atorvastatin     Rosuvastatin        Review of Systems   Review of Systems   Constitutional: Negative for chills, decreased appetite, diaphoresis, fever, malaise/fatigue, weight gain and weight loss.   Eyes:  Negative for blurred vision.   Cardiovascular:  Positive for leg swelling. Negative for chest pain, claudication, dyspnea on exertion, irregular heartbeat, near-syncope, orthopnea, palpitations, paroxysmal nocturnal dyspnea and syncope.   Respiratory:  Negative for cough, shortness of breath, snoring, sputum production and wheezing.    Endocrine: Negative for cold intolerance, heat intolerance, polydipsia, polyphagia and polyuria.   Skin:  Positive for poor wound healing. Negative for color change, dry skin, itching and nail changes.   Musculoskeletal:  Negative for back pain, gout, joint pain and joint swelling.   Gastrointestinal:  Negative for bloating, abdominal pain, constipation, diarrhea, hematemesis, hematochezia, melena, nausea and vomiting.   Genitourinary:  Negative for dysuria and hematuria.   Neurological:  Negative for dizziness, headaches, light-headedness, numbness, paresthesias and weakness.   Psychiatric/Behavioral:  Negative for altered mental status, depression and memory loss.        Physical  Exam  Vitals:    10/16/24 0922   BP: 125/76   Pulse: 85     Wt Readings from Last 1 Encounters:   10/16/24 81.6 kg (180 lb)     Physical Exam  HENT:      Head: Normocephalic and atraumatic.      Mouth/Throat:      Mouth: Mucous membranes are moist.   Eyes:      Extraocular Movements: Extraocular movements intact.      Pupils: Pupils are equal, round, and reactive to light.   Neck:      Vascular: No carotid bruit.   Cardiovascular:      Heart sounds: No murmur heard.     No friction rub. No gallop.   Pulmonary:      Effort: Pulmonary effort is normal. No respiratory distress.   Abdominal:      General: Abdomen is flat.      Palpations: Abdomen is soft.   Musculoskeletal:      Right lower leg: Edema present.      Left lower leg: Edema present.   Skin:     General: Skin is warm.      Capillary Refill: Capillary refill takes more than 3 seconds.   Neurological:      General: No focal deficit present.      Mental Status: He is alert.   Psychiatric:         Mood and Affect: Mood normal.         Labs  Admission on 10/07/2024, Discharged on 10/10/2024   Component Date Value Ref Range Status    WBC 10/07/2024 7.83  3.90 - 12.70 K/uL Final    RBC 10/07/2024 3.67 (L)  4.60 - 6.20 M/uL Final    Hemoglobin 10/07/2024 9.6 (L)  14.0 - 18.0 g/dL Final    Hematocrit 10/07/2024 32.9 (L)  40.0 - 54.0 % Final    MCV 10/07/2024 90  82 - 98 fL Final    MCH 10/07/2024 26.2 (L)  27.0 - 31.0 pg Final    MCHC 10/07/2024 29.2 (L)  32.0 - 36.0 g/dL Final    RDW 10/07/2024 18.6 (H)  11.5 - 14.5 % Final    Platelets 10/07/2024 138 (L)  150 - 450 K/uL Final    MPV 10/07/2024 11.9  9.2 - 12.9 fL Final    Immature Granulocytes 10/07/2024 0.5  0.0 - 0.5 % Final    Gran # (ANC) 10/07/2024 6.4  1.8 - 7.7 K/uL Final    Immature Grans (Abs) 10/07/2024 0.04  0.00 - 0.04 K/uL Final    Comment: Mild elevation in immature granulocytes is non specific and   can be seen in a variety of conditions including stress response,   acute inflammation, trauma and  pregnancy. Correlation with other   laboratory and clinical findings is essential.      Lymph # 10/07/2024 0.8 (L)  1.0 - 4.8 K/uL Final    Mono # 10/07/2024 0.6  0.3 - 1.0 K/uL Final    Eos # 10/07/2024 0.0  0.0 - 0.5 K/uL Final    Baso # 10/07/2024 0.01  0.00 - 0.20 K/uL Final    nRBC 10/07/2024 0  0 /100 WBC Final    Gran % 10/07/2024 82.3 (H)  38.0 - 73.0 % Final    Lymph % 10/07/2024 9.6 (L)  18.0 - 48.0 % Final    Mono % 10/07/2024 7.0  4.0 - 15.0 % Final    Eosinophil % 10/07/2024 0.5  0.0 - 8.0 % Final    Basophil % 10/07/2024 0.1  0.0 - 1.9 % Final    Differential Method 10/07/2024 Automated   Final    Sodium 10/07/2024 140  136 - 145 mmol/L Final    Potassium 10/07/2024 4.7  3.5 - 5.1 mmol/L Final    Chloride 10/07/2024 111 (H)  95 - 110 mmol/L Final    CO2 10/07/2024 19 (L)  23 - 29 mmol/L Final    Glucose 10/07/2024 245 (H)  70 - 110 mg/dL Final    BUN 10/07/2024 50 (H)  8 - 23 mg/dL Final    Creatinine 10/07/2024 2.2 (H)  0.5 - 1.4 mg/dL Final    Calcium 10/07/2024 8.5 (L)  8.7 - 10.5 mg/dL Final    Total Protein 10/07/2024 5.8 (L)  6.0 - 8.4 g/dL Final    Albumin 10/07/2024 2.9 (L)  3.5 - 5.2 g/dL Final    Total Bilirubin 10/07/2024 1.1 (H)  0.1 - 1.0 mg/dL Final    Comment: For infants and newborns, interpretation of results should be based  on gestational age, weight and in agreement with clinical  observations.    Premature Infant recommended reference ranges:  Up to 24 hours.............<8.0 mg/dL  Up to 48 hours............<12.0 mg/dL  3-5 days..................<15.0 mg/dL  6-29 days.................<15.0 mg/dL      Alkaline Phosphatase 10/07/2024 143 (H)  55 - 135 U/L Final    AST 10/07/2024 24  10 - 40 U/L Final    ALT 10/07/2024 26  10 - 44 U/L Final    eGFR 10/07/2024 30.5 (A)  >60 mL/min/1.73 m^2 Final    Anion Gap 10/07/2024 10  8 - 16 mmol/L Final    QRS Duration 10/08/2024 136  ms Final    [facility] QTC Calculation 10/08/2024 524  ms Final    Specimen UA 10/07/2024 Urine, Clean Catch   Final     Color, UA 10/07/2024 Yellow  Yellow, Straw, Stacy Final    Appearance, UA 10/07/2024 Clear  Clear Final    pH, UA 10/07/2024 6.0  5.0 - 8.0 Final    Specific Gravity, UA 10/07/2024 1.020  1.005 - 1.030 Final    Protein, UA 10/07/2024 1+ (A)  Negative Final    Comment: Recommend a 24 hour urine protein or a urine   protein/creatinine ratio if globulin induced proteinuria is  clinically suspected.      Glucose, UA 10/07/2024 Negative  Negative Final    Ketones, UA 10/07/2024 Negative  Negative Final    Bilirubin (UA) 10/07/2024 Negative  Negative Final    Occult Blood UA 10/07/2024 Negative  Negative Final    Nitrite, UA 10/07/2024 Negative  Negative Final    Urobilinogen, UA 10/07/2024 Negative  Negative EU/dL Final    Leukocytes, UA 10/07/2024 Negative  Negative Final    CRP 10/07/2024 16.7 (H)  0.0 - 8.2 mg/L Final    RBC, UA 10/07/2024 2  0 - 4 /hpf Final    WBC, UA 10/07/2024 1  0 - 5 /hpf Final    Bacteria 10/07/2024 Rare  None-Occ /hpf Final    Hyaline Casts, UA 10/07/2024 1  0-1/lpf /lpf Final    Microscopic Comment 10/07/2024 SEE COMMENT   Final    Comment: Other formed elements not mentioned in the report are not   present in the microscopic examination.       POCT Glucose 10/07/2024 175 (H)  70 - 110 mg/dL Final    BNP 10/07/2024 2,994 (H)  0 - 99 pg/mL Final    Values of less than 100 pg/ml are consistent with non-CHF populations.    Troponin I 10/07/2024 0.064 (H)  0.000 - 0.026 ng/mL Final    Comment: The reference interval for Troponin I represents the 99th percentile   cutoff   for our facility and is consistent with 3rd generation assay   performance.      POCT Glucose 10/07/2024 206 (H)  70 - 110 mg/dL Final    Sodium 10/08/2024 140  136 - 145 mmol/L Final    Potassium 10/08/2024 5.0  3.5 - 5.1 mmol/L Final    Chloride 10/08/2024 111 (H)  95 - 110 mmol/L Final    CO2 10/08/2024 18 (L)  23 - 29 mmol/L Final    Glucose 10/08/2024 146 (H)  70 - 110 mg/dL Final    BUN 10/08/2024 53 (H)  8 - 23 mg/dL  Final    Creatinine 10/08/2024 2.3 (H)  0.5 - 1.4 mg/dL Final    Calcium 10/08/2024 8.3 (L)  8.7 - 10.5 mg/dL Final    Anion Gap 10/08/2024 11  8 - 16 mmol/L Final    eGFR 10/08/2024 28.9 (A)  >60 mL/min/1.73 m^2 Final    WBC 10/08/2024 5.26  3.90 - 12.70 K/uL Final    RBC 10/08/2024 3.57 (L)  4.60 - 6.20 M/uL Final    Hemoglobin 10/08/2024 9.4 (L)  14.0 - 18.0 g/dL Final    Hematocrit 10/08/2024 31.9 (L)  40.0 - 54.0 % Final    MCV 10/08/2024 89  82 - 98 fL Final    MCH 10/08/2024 26.3 (L)  27.0 - 31.0 pg Final    MCHC 10/08/2024 29.5 (L)  32.0 - 36.0 g/dL Final    RDW 10/08/2024 19.0 (H)  11.5 - 14.5 % Final    Platelets 10/08/2024 114 (L)  150 - 450 K/uL Final    MPV 10/08/2024 10.4  9.2 - 12.9 fL Final    Immature Granulocytes 10/08/2024 0.4  0.0 - 0.5 % Final    Gran # (ANC) 10/08/2024 3.5  1.8 - 7.7 K/uL Final    Immature Grans (Abs) 10/08/2024 0.02  0.00 - 0.04 K/uL Final    Comment: Mild elevation in immature granulocytes is non specific and   can be seen in a variety of conditions including stress response,   acute inflammation, trauma and pregnancy. Correlation with other   laboratory and clinical findings is essential.      Lymph # 10/08/2024 1.3  1.0 - 4.8 K/uL Final    Mono # 10/08/2024 0.4  0.3 - 1.0 K/uL Final    Eos # 10/08/2024 0.1  0.0 - 0.5 K/uL Final    Baso # 10/08/2024 0.01  0.00 - 0.20 K/uL Final    nRBC 10/08/2024 0  0 /100 WBC Final    Gran % 10/08/2024 66.8  38.0 - 73.0 % Final    Lymph % 10/08/2024 24.5  18.0 - 48.0 % Final    Mono % 10/08/2024 7.0  4.0 - 15.0 % Final    Eosinophil % 10/08/2024 1.1  0.0 - 8.0 % Final    Basophil % 10/08/2024 0.2  0.0 - 1.9 % Final    Differential Method 10/08/2024 Automated   Final    POCT Glucose 10/08/2024 171 (H)  70 - 110 mg/dL Final    POCT Glucose 10/08/2024 156 (H)  70 - 110 mg/dL Final    Troponin I 10/08/2024 0.079 (H)  0.000 - 0.026 ng/mL Final    Comment: The reference interval for Troponin I represents the 99th percentile   cutoff   for our  facility and is consistent with 3rd generation assay   performance.      Troponin I 10/08/2024 0.075 (H)  0.000 - 0.026 ng/mL Final    Comment: The reference interval for Troponin I represents the 99th percentile   cutoff   for our facility and is consistent with 3rd generation assay   performance.      POCT Glucose 10/08/2024 155 (H)  70 - 110 mg/dL Final    POCT Glucose 10/08/2024 176 (H)  70 - 110 mg/dL Final    Sodium 10/09/2024 138  136 - 145 mmol/L Final    Potassium 10/09/2024 5.7 (H)  3.5 - 5.1 mmol/L Final    Chloride 10/09/2024 109  95 - 110 mmol/L Final    CO2 10/09/2024 15 (L)  23 - 29 mmol/L Final    Glucose 10/09/2024 143 (H)  70 - 110 mg/dL Final    BUN 10/09/2024 64 (H)  8 - 23 mg/dL Final    Creatinine 10/09/2024 3.2 (H)  0.5 - 1.4 mg/dL Final    Calcium 10/09/2024 8.5 (L)  8.7 - 10.5 mg/dL Final    Anion Gap 10/09/2024 14  8 - 16 mmol/L Final    eGFR 10/09/2024 19.4 (A)  >60 mL/min/1.73 m^2 Final    WBC 10/09/2024 5.11  3.90 - 12.70 K/uL Final    RBC 10/09/2024 4.00 (L)  4.60 - 6.20 M/uL Final    Hemoglobin 10/09/2024 10.4 (L)  14.0 - 18.0 g/dL Final    Hematocrit 10/09/2024 35.5 (L)  40.0 - 54.0 % Final    MCV 10/09/2024 89  82 - 98 fL Final    MCH 10/09/2024 26.0 (L)  27.0 - 31.0 pg Final    MCHC 10/09/2024 29.3 (L)  32.0 - 36.0 g/dL Final    RDW 10/09/2024 19.4 (H)  11.5 - 14.5 % Final    Platelets 10/09/2024 120 (L)  150 - 450 K/uL Final    MPV 10/09/2024 SEE COMMENT  9.2 - 12.9 fL Final    Result not available.    Immature Granulocytes 10/09/2024 0.4  0.0 - 0.5 % Final    Gran # (ANC) 10/09/2024 3.6  1.8 - 7.7 K/uL Final    Immature Grans (Abs) 10/09/2024 0.02  0.00 - 0.04 K/uL Final    Comment: Mild elevation in immature granulocytes is non specific and   can be seen in a variety of conditions including stress response,   acute inflammation, trauma and pregnancy. Correlation with other   laboratory and clinical findings is essential.      Lymph # 10/09/2024 1.1  1.0 - 4.8 K/uL Final    Mono #  10/09/2024 0.3  0.3 - 1.0 K/uL Final    Eos # 10/09/2024 0.1  0.0 - 0.5 K/uL Final    Baso # 10/09/2024 0.02  0.00 - 0.20 K/uL Final    nRBC 10/09/2024 0  0 /100 WBC Final    Gran % 10/09/2024 70.8  38.0 - 73.0 % Final    Lymph % 10/09/2024 21.3  18.0 - 48.0 % Final    Mono % 10/09/2024 6.1  4.0 - 15.0 % Final    Eosinophil % 10/09/2024 1.0  0.0 - 8.0 % Final    Basophil % 10/09/2024 0.4  0.0 - 1.9 % Final    Platelet Estimate 10/09/2024 Decreased (A)   Final    Differential Method 10/09/2024 Automated   Final    POCT Glucose 10/09/2024 137 (H)  70 - 110 mg/dL Final    Sodium 10/09/2024 138  136 - 145 mmol/L Final    Potassium 10/09/2024 5.3 (H)  3.5 - 5.1 mmol/L Final    Chloride 10/09/2024 107  95 - 110 mmol/L Final    CO2 10/09/2024 18 (L)  23 - 29 mmol/L Final    Glucose 10/09/2024 178 (H)  70 - 110 mg/dL Final    BUN 10/09/2024 70 (H)  8 - 23 mg/dL Final    Creatinine 10/09/2024 3.4 (H)  0.5 - 1.4 mg/dL Final    Calcium 10/09/2024 8.5 (L)  8.7 - 10.5 mg/dL Final    Anion Gap 10/09/2024 13  8 - 16 mmol/L Final    eGFR 10/09/2024 18.1 (A)  >60 mL/min/1.73 m^2 Final    POCT Glucose 10/09/2024 187 (H)  70 - 110 mg/dL Final    POCT Glucose 10/09/2024 191 (H)  70 - 110 mg/dL Final    WBC 10/10/2024 7.75  3.90 - 12.70 K/uL Final    RBC 10/10/2024 4.27 (L)  4.60 - 6.20 M/uL Final    Hemoglobin 10/10/2024 11.1 (L)  14.0 - 18.0 g/dL Final    Hematocrit 10/10/2024 38.4 (L)  40.0 - 54.0 % Final    MCV 10/10/2024 90  82 - 98 fL Final    MCH 10/10/2024 26.0 (L)  27.0 - 31.0 pg Final    MCHC 10/10/2024 28.9 (L)  32.0 - 36.0 g/dL Final    RDW 10/10/2024 19.4 (H)  11.5 - 14.5 % Final    Platelets 10/10/2024 134 (L)  150 - 450 K/uL Final    MPV 10/10/2024 11.5  9.2 - 12.9 fL Final    Immature Granulocytes 10/10/2024 0.4  0.0 - 0.5 % Final    Gran # (ANC) 10/10/2024 6.0  1.8 - 7.7 K/uL Final    Immature Grans (Abs) 10/10/2024 0.03  0.00 - 0.04 K/uL Final    Comment: Mild elevation in immature granulocytes is non specific and    can be seen in a variety of conditions including stress response,   acute inflammation, trauma and pregnancy. Correlation with other   laboratory and clinical findings is essential.      Lymph # 10/10/2024 1.3  1.0 - 4.8 K/uL Final    Mono # 10/10/2024 0.3  0.3 - 1.0 K/uL Final    Eos # 10/10/2024 0.1  0.0 - 0.5 K/uL Final    Baso # 10/10/2024 0.02  0.00 - 0.20 K/uL Final    nRBC 10/10/2024 0  0 /100 WBC Final    Gran % 10/10/2024 77.4 (H)  38.0 - 73.0 % Final    Lymph % 10/10/2024 16.6 (L)  18.0 - 48.0 % Final    Mono % 10/10/2024 3.6 (L)  4.0 - 15.0 % Final    Eosinophil % 10/10/2024 1.7  0.0 - 8.0 % Final    Basophil % 10/10/2024 0.3  0.0 - 1.9 % Final    Differential Method 10/10/2024 Automated   Final    Sodium 10/10/2024 138  136 - 145 mmol/L Final    Potassium 10/10/2024 5.5 (H)  3.5 - 5.1 mmol/L Final    Chloride 10/10/2024 108  95 - 110 mmol/L Final    CO2 10/10/2024 15 (L)  23 - 29 mmol/L Final    Glucose 10/10/2024 73  70 - 110 mg/dL Final    BUN 10/10/2024 72 (H)  8 - 23 mg/dL Final    Creatinine 10/10/2024 3.5 (H)  0.5 - 1.4 mg/dL Final    Calcium 10/10/2024 8.8  8.7 - 10.5 mg/dL Final    Total Protein 10/10/2024 6.5  6.0 - 8.4 g/dL Final    Albumin 10/10/2024 3.2 (L)  3.5 - 5.2 g/dL Final    Total Bilirubin 10/10/2024 0.9  0.1 - 1.0 mg/dL Final    Comment: For infants and newborns, interpretation of results should be based  on gestational age, weight and in agreement with clinical  observations.    Premature Infant recommended reference ranges:  Up to 24 hours.............<8.0 mg/dL  Up to 48 hours............<12.0 mg/dL  3-5 days..................<15.0 mg/dL  6-29 days.................<15.0 mg/dL      Alkaline Phosphatase 10/10/2024 127  55 - 135 U/L Final    AST 10/10/2024 61 (H)  10 - 40 U/L Final    ALT 10/10/2024 62 (H)  10 - 44 U/L Final    eGFR 10/10/2024 17.5 (A)  >60 mL/min/1.73 m^2 Final    Anion Gap 10/10/2024 15  8 - 16 mmol/L Final    POCT Glucose 10/10/2024 90  70 - 110 mg/dL Final     Uric Acid 10/10/2024 7.9 (H)  3.4 - 7.0 mg/dL Final    POCT Glucose 10/10/2024 155 (H)  70 - 110 mg/dL Final   Admission on 10/02/2024, Discharged on 10/04/2024   Component Date Value Ref Range Status    QRS Duration 10/02/2024 118  ms Final    [facility] QTC Calculation 10/02/2024 493  ms Final    WBC 10/02/2024 7.95  3.90 - 12.70 K/uL Final    RBC 10/02/2024 4.24 (L)  4.60 - 6.20 M/uL Final    Hemoglobin 10/02/2024 10.9 (L)  14.0 - 18.0 g/dL Final    Hematocrit 10/02/2024 35.9 (L)  40.0 - 54.0 % Final    MCV 10/02/2024 85  82 - 98 fL Final    MCH 10/02/2024 25.7 (L)  27.0 - 31.0 pg Final    MCHC 10/02/2024 30.4 (L)  32.0 - 36.0 g/dL Final    RDW 10/02/2024 17.6 (H)  11.5 - 14.5 % Final    Platelets 10/02/2024 230  150 - 450 K/uL Final    MPV 10/02/2024 11.8  9.2 - 12.9 fL Final    Immature Granulocytes 10/02/2024 0.5  0.0 - 0.5 % Final    Gran # (ANC) 10/02/2024 6.8  1.8 - 7.7 K/uL Final    Immature Grans (Abs) 10/02/2024 0.04  0.00 - 0.04 K/uL Final    Comment: Mild elevation in immature granulocytes is non specific and   can be seen in a variety of conditions including stress response,   acute inflammation, trauma and pregnancy. Correlation with other   laboratory and clinical findings is essential.      Lymph # 10/02/2024 0.7 (L)  1.0 - 4.8 K/uL Final    Mono # 10/02/2024 0.4  0.3 - 1.0 K/uL Final    Eos # 10/02/2024 0.0  0.0 - 0.5 K/uL Final    Baso # 10/02/2024 0.00  0.00 - 0.20 K/uL Final    nRBC 10/02/2024 0  0 /100 WBC Final    Gran % 10/02/2024 86.0 (H)  38.0 - 73.0 % Final    Lymph % 10/02/2024 8.7 (L)  18.0 - 48.0 % Final    Mono % 10/02/2024 4.8  4.0 - 15.0 % Final    Eosinophil % 10/02/2024 0.0  0.0 - 8.0 % Final    Basophil % 10/02/2024 0.0  0.0 - 1.9 % Final    Differential Method 10/02/2024 Automated   Final    Sodium 10/02/2024 133 (L)  136 - 145 mmol/L Final    Potassium 10/02/2024 5.1  3.5 - 5.1 mmol/L Final    Chloride 10/02/2024 97  95 - 110 mmol/L Final    CO2 10/02/2024 20 (L)  23 - 29 mmol/L Final     Glucose 10/02/2024 159 (H)  70 - 110 mg/dL Final    BUN 10/02/2024 93 (H)  8 - 23 mg/dL Final    Creatinine 10/02/2024 4.2 (H)  0.5 - 1.4 mg/dL Final    Calcium 10/02/2024 9.2  8.7 - 10.5 mg/dL Final    Total Protein 10/02/2024 6.4  6.0 - 8.4 g/dL Final    Albumin 10/02/2024 3.3 (L)  3.5 - 5.2 g/dL Final    Total Bilirubin 10/02/2024 1.2 (H)  0.1 - 1.0 mg/dL Final    Comment: For infants and newborns, interpretation of results should be based  on gestational age, weight and in agreement with clinical  observations.    Premature Infant recommended reference ranges:  Up to 24 hours.............<8.0 mg/dL  Up to 48 hours............<12.0 mg/dL  3-5 days..................<15.0 mg/dL  6-29 days.................<15.0 mg/dL      Alkaline Phosphatase 10/02/2024 145 (H)  55 - 135 U/L Final    AST 10/02/2024 38  10 - 40 U/L Final    ALT 10/02/2024 42  10 - 44 U/L Final    eGFR 10/02/2024 14.0 (A)  >60 mL/min/1.73 m^2 Final    Anion Gap 10/02/2024 16  8 - 16 mmol/L Final    Troponin I 10/02/2024 0.105 (H)  0.000 - 0.026 ng/mL Final    Comment: The reference interval for Troponin I represents the 99th percentile   cutoff   for our facility and is consistent with 3rd generation assay   performance.      BNP 10/02/2024 2,934 (H)  0 - 99 pg/mL Final    Values of less than 100 pg/ml are consistent with non-CHF populations.    Magnesium 10/02/2024 2.2  1.6 - 2.6 mg/dL Final    Prothrombin Time 10/02/2024 14.0 (H)  9.0 - 12.5 sec Final    INR 10/02/2024 1.3 (H)  0.8 - 1.2 Final    Comment: Coumadin Therapy:  2.0 - 3.0 for INR for all indicators except mechanical heart valves  and antiphospholipid syndromes which should use 2.5 - 3.5.  LOT^040^PT Inn^[id number]      D-Dimer 10/02/2024 3.82 (H)  <0.50 mg/L FEU Final    Comment: The quantitative D-dimer assay should be used as an aid in   the diagnosis of deep vein thrombosis and pulmonary embolism  in patients with the appropriate presentation and clinical  history. The upper limit of  the reference interval and the clinical   cut off   point are identical. Causes of a positive (>0.50 mg/L FEU) D-Dimer   test  include, but are not limited to: DVT, PE, DIC, thrombolytic   therapy, anticoagulant therapy, recent surgery, trauma, or   pregnancy, disseminated malignancy, aortic aneurysm, cirrhosis,  and severe infection. False negative results may occur in   patients with distal DVT.  DAKOTA^612^^18^  LOT^610^DDiSup^[id number]\DDiBuf^[id number]\DDiReag^[id number]      Troponin I 10/02/2024 0.098 (H)  0.000 - 0.026 ng/mL Final    Comment: The reference interval for Troponin I represents the 99th percentile   cutoff   for our facility and is consistent with 3rd generation assay   performance.      POCT Glucose 10/03/2024 120 (H)  70 - 110 mg/dL Final    Sodium 10/03/2024 133 (L)  136 - 145 mmol/L Final    Potassium 10/03/2024 4.9  3.5 - 5.1 mmol/L Final    Chloride 10/03/2024 99  95 - 110 mmol/L Final    CO2 10/03/2024 19 (L)  23 - 29 mmol/L Final    Glucose 10/03/2024 123 (H)  70 - 110 mg/dL Final    BUN 10/03/2024 94 (H)  8 - 23 mg/dL Final    Creatinine 10/03/2024 4.2 (H)  0.5 - 1.4 mg/dL Final    Calcium 10/03/2024 8.5 (L)  8.7 - 10.5 mg/dL Final    Anion Gap 10/03/2024 15  8 - 16 mmol/L Final    eGFR 10/03/2024 14.0 (A)  >60 mL/min/1.73 m^2 Final    WBC 10/03/2024 7.12  3.90 - 12.70 K/uL Final    RBC 10/03/2024 3.93 (L)  4.60 - 6.20 M/uL Final    Hemoglobin 10/03/2024 10.3 (L)  14.0 - 18.0 g/dL Final    Hematocrit 10/03/2024 34.1 (L)  40.0 - 54.0 % Final    MCV 10/03/2024 87  82 - 98 fL Final    MCH 10/03/2024 26.2 (L)  27.0 - 31.0 pg Final    MCHC 10/03/2024 30.2 (L)  32.0 - 36.0 g/dL Final    RDW 10/03/2024 17.7 (H)  11.5 - 14.5 % Final    Platelets 10/03/2024 205  150 - 450 K/uL Final    MPV 10/03/2024 12.6  9.2 - 12.9 fL Final    Troponin I 10/03/2024 0.086 (H)  0.000 - 0.026 ng/mL Final    Comment: The reference interval for Troponin I represents the 99th percentile   cutoff   for our facility and is  consistent with 3rd generation assay   performance.      Influenza A, Molecular 10/03/2024 Negative  Negative Final    Influenza B, Molecular 10/03/2024 Negative  Negative Final    Flu A & B Source 10/03/2024 Nasal Swab   Final    SARS-CoV-2 RNA, Amplification, Qual 10/03/2024 Negative  Negative Final    Comment: This test utilizes isothermal nucleic acid amplification technology   to   detect the SARS-CoV-2 RdRp nucleic acid segment. The analytical   sensitivity   (limit of detection) is 500 copies/swab.    A POSITIVE result is indicative of the presence of SARS-CoV-2 RNA;   clinical   correlation with patient history and other diagnostic information is   necessary to determine patient infection status.    A NEGATIVE result means that SARS-CoV-2 nucleic acids are not present   above   the limit of detection. A NEGATIVE result should be treated as   presumptive.   It does not rule out the possibility of COVID-19 and should not be   the sole   basis for treatment decisions.    If COVID-19 is strongly suspected based on clinical and exposure   history,   re-testing using an alternate molecular assay should be considered.    This test is Food and Drug Administration (FDA) approved. Performance   characteristics of this has been independently verified by [facility] Department of Pat                           hology and Laboratory Medicine.      Specimen UA 10/03/2024 Urine, Clean Catch   Final    Color, UA 10/03/2024 Yellow  Yellow, Straw, Stacy Final    Appearance, UA 10/03/2024 Clear  Clear Final    pH, UA 10/03/2024 5.0  5.0 - 8.0 Final    Specific Gravity, UA 10/03/2024 1.015  1.005 - 1.030 Final    Protein, UA 10/03/2024 Trace (A)  Negative Final    Comment: Recommend a 24 hour urine protein or a urine   protein/creatinine ratio if globulin induced proteinuria is  clinically suspected.      Glucose, UA 10/03/2024 Negative  Negative Final    Ketones, UA 10/03/2024 Negative  Negative Final     Bilirubin (UA) 10/03/2024 Negative  Negative Final    Occult Blood UA 10/03/2024 Negative  Negative Final    Nitrite, UA 10/03/2024 Negative  Negative Final    Urobilinogen, UA 10/03/2024 Negative  Negative EU/dL Final    Leukocytes, UA 10/03/2024 Negative  Negative Final    Iron 10/03/2024 27 (L)  45 - 160 ug/dL Final    Transferrin 10/03/2024 299  200 - 375 mg/dL Final    TIBC 10/03/2024 443  250 - 450 ug/dL Final    Saturated Iron 10/03/2024 6 (L)  20 - 50 % Final    Ferritin 10/03/2024 119  20.0 - 300.0 ng/mL Final    PTH, Intact 10/03/2024 151.9 (H)  9.0 - 77.0 pg/mL Final    Vit D, 25-Hydroxy 10/03/2024 50  30 - 96 ng/mL Final    Comment: Vitamin D deficiency.........<10 ng/mL                              Vitamin D insufficiency......10-29 ng/mL       Vitamin D sufficiency........> or equal to 30 ng/mL  Vitamin D toxicity............>100 ng/mL      Uric Acid 10/03/2024 7.3 (H)  3.4 - 7.0 mg/dL Final    POCT Glucose 10/03/2024 100  70 - 110 mg/dL Final    POCT Glucose 10/03/2024 65 (L)  70 - 110 mg/dL Final    POCT Glucose 10/03/2024 137 (H)  70 - 110 mg/dL Final    POCT Glucose 10/03/2024 160 (H)  70 - 110 mg/dL Final    Magnesium 10/04/2024 2.1  1.6 - 2.6 mg/dL Final    WBC 10/04/2024 7.96  3.90 - 12.70 K/uL Final    RBC 10/04/2024 4.05 (L)  4.60 - 6.20 M/uL Final    Hemoglobin 10/04/2024 10.4 (L)  14.0 - 18.0 g/dL Final    Hematocrit 10/04/2024 35.5 (L)  40.0 - 54.0 % Final    MCV 10/04/2024 88  82 - 98 fL Final    MCH 10/04/2024 25.7 (L)  27.0 - 31.0 pg Final    MCHC 10/04/2024 29.3 (L)  32.0 - 36.0 g/dL Final    RDW 10/04/2024 18.2 (H)  11.5 - 14.5 % Final    Platelets 10/04/2024 175  150 - 450 K/uL Final    MPV 10/04/2024 10.8  9.2 - 12.9 fL Final    Immature Granulocytes 10/04/2024 0.9 (H)  0.0 - 0.5 % Final    Gran # (ANC) 10/04/2024 5.5  1.8 - 7.7 K/uL Final    Immature Grans (Abs) 10/04/2024 0.07 (H)  0.00 - 0.04 K/uL Final    Comment: Mild elevation in immature granulocytes is non specific and   can  be seen in a variety of conditions including stress response,   acute inflammation, trauma and pregnancy. Correlation with other   laboratory and clinical findings is essential.      Lymph # 10/04/2024 1.8  1.0 - 4.8 K/uL Final    Mono # 10/04/2024 0.5  0.3 - 1.0 K/uL Final    Eos # 10/04/2024 0.1  0.0 - 0.5 K/uL Final    Baso # 10/04/2024 0.02  0.00 - 0.20 K/uL Final    nRBC 10/04/2024 0  0 /100 WBC Final    Gran % 10/04/2024 68.6  38.0 - 73.0 % Final    Lymph % 10/04/2024 23.0  18.0 - 48.0 % Final    Mono % 10/04/2024 6.3  4.0 - 15.0 % Final    Eosinophil % 10/04/2024 0.9  0.0 - 8.0 % Final    Basophil % 10/04/2024 0.3  0.0 - 1.9 % Final    Differential Method 10/04/2024 Automated   Final    Sodium 10/04/2024 137  136 - 145 mmol/L Final    Potassium 10/04/2024 4.1  3.5 - 5.1 mmol/L Final    Chloride 10/04/2024 104  95 - 110 mmol/L Final    CO2 10/04/2024 19 (L)  23 - 29 mmol/L Final    Glucose 10/04/2024 79  70 - 110 mg/dL Final    BUN 10/04/2024 84 (H)  8 - 23 mg/dL Final    Creatinine 10/04/2024 3.5 (H)  0.5 - 1.4 mg/dL Final    Calcium 10/04/2024 8.5 (L)  8.7 - 10.5 mg/dL Final    Total Protein 10/04/2024 5.8 (L)  6.0 - 8.4 g/dL Final    Albumin 10/04/2024 3.0 (L)  3.5 - 5.2 g/dL Final    Total Bilirubin 10/04/2024 1.0  0.1 - 1.0 mg/dL Final    Comment: For infants and newborns, interpretation of results should be based  on gestational age, weight and in agreement with clinical  observations.    Premature Infant recommended reference ranges:  Up to 24 hours.............<8.0 mg/dL  Up to 48 hours............<12.0 mg/dL  3-5 days..................<15.0 mg/dL  6-29 days.................<15.0 mg/dL      Alkaline Phosphatase 10/04/2024 128  55 - 135 U/L Final    AST 10/04/2024 34  10 - 40 U/L Final    ALT 10/04/2024 38  10 - 44 U/L Final    eGFR 10/04/2024 17.5 (A)  >60 mL/min/1.73 m^2 Final    Anion Gap 10/04/2024 14  8 - 16 mmol/L Final    POCT Glucose 10/04/2024 96  70 - 110 mg/dL Final   Hospital Outpatient  Visit on 09/30/2024   Component Date Value Ref Range Status    QRS Duration 09/30/2024 120  ms Final    [facility] QTC Calculation 09/30/2024 525  ms Final   Clinical Support on 09/25/2024   Component Date Value Ref Range Status    ICD Shock 09/18/2024 No   Final    Device Type 09/18/2024 Loop   Final    AF [place] % 09/18/2024 43.3   Final   Office Visit on 09/24/2024   Component Date Value Ref Range Status    POC Glucose 09/24/2024 117 (A)  70 - 110 MG/DL Final   Office Visit on 09/19/2024   Component Date Value Ref Range Status    Glucose, UA 09/19/2024 Neg   Final    Bilirubin, POC 09/19/2024 Neg   Final    Ketones, UA 09/19/2024 Neg   Final    Spec Grav UA 09/19/2024 1.015   Final    Blood, UA 09/19/2024 Neg   Final    pH, UA 09/19/2024 5   Final    Protein, POC 09/19/2024 Neg   Final    Urobilinogen, UA 09/19/2024 Normal   Final    Nitrite, UA 09/19/2024 Neg   Final    WBC, UA 09/19/2024 Neg   Final    Color, UA 09/19/2024 Yellow   Final    Clarity, UA 09/19/2024 Clear   Final    POC Residual Urine Volume 09/19/2024 0  0 - 100 mL Final    Urine Culture, Routine 09/19/2024  (A)   Final                    Value:ACINETOBACTER BAUMANNII/HAEMOLYTICUS  >100,000 cfu/ml  No other significant isolate     Admission on 09/18/2024, Discharged on 09/18/2024   Component Date Value Ref Range Status    WBC 09/18/2024 4.07  3.90 - 12.70 K/uL Final    RBC 09/18/2024 3.87 (L)  4.60 - 6.20 M/uL Final    Hemoglobin 09/18/2024 10.2 (L)  14.0 - 18.0 g/dL Final    Hematocrit 09/18/2024 33.3 (L)  40.0 - 54.0 % Final    MCV 09/18/2024 86  82 - 98 fL Final    MCH 09/18/2024 26.4 (L)  27.0 - 31.0 pg Final    MCHC 09/18/2024 30.6 (L)  32.0 - 36.0 g/dL Final    RDW 09/18/2024 16.0 (H)  11.5 - 14.5 % Final    Platelets 09/18/2024 192  150 - 450 K/uL Final    MPV 09/18/2024 11.7  9.2 - 12.9 fL Final    Immature Granulocytes 09/18/2024 0.5  0.0 - 0.5 % Final    Gran # (ANC) 09/18/2024 2.6  1.8 - 7.7 K/uL Final    Immature Grans (Abs) 09/18/2024 0.02   0.00 - 0.04 K/uL Final    Comment: Mild elevation in immature granulocytes is non specific and   can be seen in a variety of conditions including stress response,   acute inflammation, trauma and pregnancy. Correlation with other   laboratory and clinical findings is essential.      Lymph # 09/18/2024 1.1  1.0 - 4.8 K/uL Final    Mono # 09/18/2024 0.3  0.3 - 1.0 K/uL Final    Eos # 09/18/2024 0.0  0.0 - 0.5 K/uL Final    Baso # 09/18/2024 0.02  0.00 - 0.20 K/uL Final    nRBC 09/18/2024 0  0 /100 WBC Final    Gran % 09/18/2024 63.9  38.0 - 73.0 % Final    Lymph % 09/18/2024 28.0  18.0 - 48.0 % Final    Mono % 09/18/2024 6.4  4.0 - 15.0 % Final    Eosinophil % 09/18/2024 0.7  0.0 - 8.0 % Final    Basophil % 09/18/2024 0.5  0.0 - 1.9 % Final    Differential Method 09/18/2024 Automated   Final    Specimen UA 09/18/2024 Urine, Clean Catch   Final    Color, UA 09/18/2024 Yellow  Yellow, Straw, Stacy Final    Appearance, UA 09/18/2024 Clear  Clear Final    pH, UA 09/18/2024 5.0  5.0 - 8.0 Final    Specific Gravity, UA 09/18/2024 1.010  1.005 - 1.030 Final    Protein, UA 09/18/2024 Negative  Negative Final    Comment: Recommend a 24 hour urine protein or a urine   protein/creatinine ratio if globulin induced proteinuria is  clinically suspected.      Glucose, UA 09/18/2024 Negative  Negative Final    Ketones, UA 09/18/2024 Negative  Negative Final    Bilirubin (UA) 09/18/2024 Negative  Negative Final    Occult Blood UA 09/18/2024 Negative  Negative Final    Nitrite, UA 09/18/2024 Negative  Negative Final    Urobilinogen, UA 09/18/2024 Negative  Negative EU/dL Final    Leukocytes, UA 09/18/2024 Negative  Negative Final    POCT Glucose 09/18/2024 105  70 - 110 mg/dL Final    Lipase 09/18/2024 32  4 - 60 U/L Final    Sodium 09/18/2024 138  136 - 145 mmol/L Final    Potassium 09/18/2024 3.2 (L)  3.5 - 5.1 mmol/L Final    Chloride 09/18/2024 96  95 - 110 mmol/L Final    CO2 09/18/2024 28  23 - 29 mmol/L Final    Glucose  09/18/2024 78  70 - 110 mg/dL Final    BUN 09/18/2024 41 (H)  8 - 23 mg/dL Final    Creatinine 09/18/2024 2.4 (H)  0.5 - 1.4 mg/dL Final    Calcium 09/18/2024 8.9  8.7 - 10.5 mg/dL Final    Total Protein 09/18/2024 6.1  6.0 - 8.4 g/dL Final    Albumin 09/18/2024 2.7 (L)  3.5 - 5.2 g/dL Final    Total Bilirubin 09/18/2024 0.9  0.1 - 1.0 mg/dL Final    Comment: For infants and newborns, interpretation of results should be based  on gestational age, weight and in agreement with clinical  observations.    Premature Infant recommended reference ranges:  Up to 24 hours.............<8.0 mg/dL  Up to 48 hours............<12.0 mg/dL  3-5 days..................<15.0 mg/dL  6-29 days.................<15.0 mg/dL      Alkaline Phosphatase 09/18/2024 108  55 - 135 U/L Final    AST 09/18/2024 18  10 - 40 U/L Final    ALT 09/18/2024 13  10 - 44 U/L Final    eGFR 09/18/2024 27.5 (A)  >60 mL/min/1.73 m^2 Final    Anion Gap 09/18/2024 14  8 - 16 mmol/L Final    BNP 09/18/2024 1,964 (H)  0 - 99 pg/mL Final    Values of less than 100 pg/ml are consistent with non-CHF populations.    Troponin I 09/18/2024 0.089 (H)  0.000 - 0.026 ng/mL Final    Comment: The reference interval for Troponin I represents the 99th percentile   cutoff   for our facility and is consistent with 3rd generation assay   performance.     Admission on 09/10/2024, Discharged on 09/12/2024   Component Date Value Ref Range Status    WBC 09/10/2024 6.28  3.90 - 12.70 K/uL Final    RBC 09/10/2024 3.83 (L)  4.60 - 6.20 M/uL Final    Hemoglobin 09/10/2024 10.3 (L)  14.0 - 18.0 g/dL Final    Hematocrit 09/10/2024 33.3 (L)  40.0 - 54.0 % Final    MCV 09/10/2024 87  82 - 98 fL Final    MCH 09/10/2024 26.9 (L)  27.0 - 31.0 pg Final    MCHC 09/10/2024 30.9 (L)  32.0 - 36.0 g/dL Final    RDW 09/10/2024 16.1 (H)  11.5 - 14.5 % Final    Platelets 09/10/2024 170  150 - 450 K/uL Final    MPV 09/10/2024 12.4  9.2 - 12.9 fL Final    Immature Granulocytes 09/10/2024 0.2  0.0 - 0.5 %  Final    Gran # (ANC) 09/10/2024 3.5  1.8 - 7.7 K/uL Final    Immature Grans (Abs) 09/10/2024 0.01  0.00 - 0.04 K/uL Final    Comment: Mild elevation in immature granulocytes is non specific and   can be seen in a variety of conditions including stress response,   acute inflammation, trauma and pregnancy. Correlation with other   laboratory and clinical findings is essential.      Lymph # 09/10/2024 2.2  1.0 - 4.8 K/uL Final    Mono # 09/10/2024 0.5  0.3 - 1.0 K/uL Final    Eos # 09/10/2024 0.0  0.0 - 0.5 K/uL Final    Baso # 09/10/2024 0.02  0.00 - 0.20 K/uL Final    nRBC 09/10/2024 0  0 /100 WBC Final    Gran % 09/10/2024 55.6  38.0 - 73.0 % Final    Lymph % 09/10/2024 35.0  18.0 - 48.0 % Final    Mono % 09/10/2024 8.3  4.0 - 15.0 % Final    Eosinophil % 09/10/2024 0.6  0.0 - 8.0 % Final    Basophil % 09/10/2024 0.3  0.0 - 1.9 % Final    Platelet Estimate 09/10/2024 Appears normal   Final    Differential Method 09/10/2024 Automated   Final    Sodium 09/10/2024 134 (L)  136 - 145 mmol/L Final    Potassium 09/10/2024 3.8  3.5 - 5.1 mmol/L Final    Chloride 09/10/2024 95  95 - 110 mmol/L Final    CO2 09/10/2024 27  23 - 29 mmol/L Final    Glucose 09/10/2024 92  70 - 110 mg/dL Final    BUN 09/10/2024 42 (H)  8 - 23 mg/dL Final    Creatinine 09/10/2024 2.6 (H)  0.5 - 1.4 mg/dL Final    Calcium 09/10/2024 9.0  8.7 - 10.5 mg/dL Final    Total Protein 09/10/2024 6.6  6.0 - 8.4 g/dL Final    Albumin 09/10/2024 2.9 (L)  3.5 - 5.2 g/dL Final    Total Bilirubin 09/10/2024 1.5 (H)  0.1 - 1.0 mg/dL Final    Comment: For infants and newborns, interpretation of results should be based  on gestational age, weight and in agreement with clinical  observations.    Premature Infant recommended reference ranges:  Up to 24 hours.............<8.0 mg/dL  Up to 48 hours............<12.0 mg/dL  3-5 days..................<15.0 mg/dL  6-29 days.................<15.0 mg/dL      Alkaline Phosphatase 09/10/2024 127  55 - 135 U/L Final    AST  09/10/2024 20  10 - 40 U/L Final    ALT 09/10/2024 13  10 - 44 U/L Final    eGFR 09/10/2024 24.9 (A)  >60 mL/min/1.73 m^2 Final    Anion Gap 09/10/2024 12  8 - 16 mmol/L Final    Specimen UA 09/10/2024 Urine, Clean Catch   Final    Color, UA 09/10/2024 Colorless (A)  Yellow, Straw, Stacy Final    Appearance, UA 09/10/2024 Clear  Clear Final    pH, UA 09/10/2024 6.0  5.0 - 8.0 Final    Specific Gravity, UA 09/10/2024 <1.005 (A)  1.005 - 1.030 Final    Protein, UA 09/10/2024 Negative  Negative Final    Comment: Recommend a 24 hour urine protein or a urine   protein/creatinine ratio if globulin induced proteinuria is  clinically suspected.      Glucose, UA 09/10/2024 Negative  Negative Final    Ketones, UA 09/10/2024 Negative  Negative Final    Bilirubin (UA) 09/10/2024 Negative  Negative Final    Occult Blood UA 09/10/2024 Negative  Negative Final    Nitrite, UA 09/10/2024 Negative  Negative Final    Urobilinogen, UA 09/10/2024 Negative  Negative EU/dL Final    Leukocytes, UA 09/10/2024 Negative  Negative Final    CPK 09/10/2024 43  20 - 200 U/L Final    BNP 09/10/2024 2,780 (H)  0 - 99 pg/mL Final    Values of less than 100 pg/ml are consistent with non-CHF populations.    Uric Acid 09/10/2024 5.6  3.4 - 7.0 mg/dL Final    POC Rapid COVID 09/10/2024 Negative  Negative Final     Acceptable 09/10/2024 Yes   Final    POC Molecular Influenza A Ag 09/10/2024 Negative  Negative Final    POC Molecular Influenza B Ag 09/10/2024 Negative  Negative Final     Acceptable 09/10/2024 Yes   Final    POCT Glucose 09/10/2024 66 (L)  70 - 110 mg/dL Final    Troponin I 09/10/2024 0.121 (H)  0.000 - 0.026 ng/mL Final    Comment: The reference interval for Troponin I represents the 99th percentile   cutoff   for our facility and is consistent with 3rd generation assay   performance.      POCT Glucose 09/10/2024 141 (H)  70 - 110 mg/dL Final    Troponin I 09/11/2024 0.101 (H)  0.000 - 0.026 ng/mL Final     Comment: The reference interval for Troponin I represents the 99th percentile   cutoff   for our facility and is consistent with 3rd generation assay   performance.      Magnesium 09/11/2024 1.6  1.6 - 2.6 mg/dL Final    Phosphorus 09/11/2024 4.2  2.7 - 4.5 mg/dL Final    Sodium 09/11/2024 137  136 - 145 mmol/L Final    Potassium 09/11/2024 3.1 (L)  3.5 - 5.1 mmol/L Final    Chloride 09/11/2024 97  95 - 110 mmol/L Final    CO2 09/11/2024 27  23 - 29 mmol/L Final    Glucose 09/11/2024 109  70 - 110 mg/dL Final    BUN 09/11/2024 41 (H)  8 - 23 mg/dL Final    Creatinine 09/11/2024 2.3 (H)  0.5 - 1.4 mg/dL Final    Calcium 09/11/2024 8.4 (L)  8.7 - 10.5 mg/dL Final    Anion Gap 09/11/2024 13  8 - 16 mmol/L Final    eGFR 09/11/2024 28.9 (A)  >60 mL/min/1.73 m^2 Final    WBC 09/11/2024 3.86 (L)  3.90 - 12.70 K/uL Final    RBC 09/11/2024 3.64 (L)  4.60 - 6.20 M/uL Final    Hemoglobin 09/11/2024 9.8 (L)  14.0 - 18.0 g/dL Final    Hematocrit 09/11/2024 32.3 (L)  40.0 - 54.0 % Final    MCV 09/11/2024 89  82 - 98 fL Final    MCH 09/11/2024 26.9 (L)  27.0 - 31.0 pg Final    MCHC 09/11/2024 30.3 (L)  32.0 - 36.0 g/dL Final    RDW 09/11/2024 16.0 (H)  11.5 - 14.5 % Final    Platelets 09/11/2024 137 (L)  150 - 450 K/uL Final    MPV 09/11/2024 13.0 (H)  9.2 - 12.9 fL Final    Immature Granulocytes 09/11/2024 0.0  0.0 - 0.5 % Final    Gran # (ANC) 09/11/2024 2.4  1.8 - 7.7 K/uL Final    Immature Grans (Abs) 09/11/2024 0.00  0.00 - 0.04 K/uL Final    Comment: Mild elevation in immature granulocytes is non specific and   can be seen in a variety of conditions including stress response,   acute inflammation, trauma and pregnancy. Correlation with other   laboratory and clinical findings is essential.      Lymph # 09/11/2024 1.0  1.0 - 4.8 K/uL Final    Mono # 09/11/2024 0.4  0.3 - 1.0 K/uL Final    Eos # 09/11/2024 0.0  0.0 - 0.5 K/uL Final    Baso # 09/11/2024 0.01  0.00 - 0.20 K/uL Final    nRBC 09/11/2024 0  0 /100 WBC Final    Gran  % 09/11/2024 62.9  38.0 - 73.0 % Final    Lymph % 09/11/2024 26.7  18.0 - 48.0 % Final    Mono % 09/11/2024 9.1  4.0 - 15.0 % Final    Eosinophil % 09/11/2024 1.0  0.0 - 8.0 % Final    Basophil % 09/11/2024 0.3  0.0 - 1.9 % Final    Differential Method 09/11/2024 Automated   Final    POCT Glucose 09/11/2024 157 (H)  70 - 110 mg/dL Final    Sodium 09/12/2024 138  136 - 145 mmol/L Final    Potassium 09/12/2024 3.9  3.5 - 5.1 mmol/L Final    Chloride 09/12/2024 99  95 - 110 mmol/L Final    CO2 09/12/2024 25  23 - 29 mmol/L Final    Glucose 09/12/2024 106  70 - 110 mg/dL Final    BUN 09/12/2024 40 (H)  8 - 23 mg/dL Final    Creatinine 09/12/2024 2.3 (H)  0.5 - 1.4 mg/dL Final    Calcium 09/12/2024 8.6 (L)  8.7 - 10.5 mg/dL Final    Anion Gap 09/12/2024 14  8 - 16 mmol/L Final    eGFR 09/12/2024 28.9 (A)  >60 mL/min/1.73 m^2 Final    POCT Glucose 09/12/2024 122 (H)  70 - 110 mg/dL Final    POCT Glucose 09/12/2024 166 (H)  70 - 110 mg/dL Final   Lab Visit on 09/09/2024   Component Date Value Ref Range Status    WBC 09/09/2024 6.58  3.90 - 12.70 K/uL Final    RBC 09/09/2024 3.87 (L)  4.60 - 6.20 M/uL Final    Hemoglobin 09/09/2024 10.5 (L)  14.0 - 18.0 g/dL Final    Hematocrit 09/09/2024 34.8 (L)  40.0 - 54.0 % Final    MCV 09/09/2024 90  82 - 98 fL Final    MCH 09/09/2024 27.1  27.0 - 31.0 pg Final    MCHC 09/09/2024 30.2 (L)  32.0 - 36.0 g/dL Final    RDW 09/09/2024 16.0 (H)  11.5 - 14.5 % Final    Platelets 09/09/2024 180  150 - 450 K/uL Final    MPV 09/09/2024 13.0 (H)  9.2 - 12.9 fL Final    Immature Granulocytes 09/09/2024 0.3  0.0 - 0.5 % Final    Gran # (ANC) 09/09/2024 4.1  1.8 - 7.7 K/uL Final    Immature Grans (Abs) 09/09/2024 0.02  0.00 - 0.04 K/uL Final    Comment: Mild elevation in immature granulocytes is non specific and   can be seen in a variety of conditions including stress response,   acute inflammation, trauma and pregnancy. Correlation with other   laboratory and clinical findings is essential.       Lymph # 09/09/2024 1.9  1.0 - 4.8 K/uL Final    Mono # 09/09/2024 0.5  0.3 - 1.0 K/uL Final    Eos # 09/09/2024 0.1  0.0 - 0.5 K/uL Final    Baso # 09/09/2024 0.02  0.00 - 0.20 K/uL Final    nRBC 09/09/2024 0  0 /100 WBC Final    Gran % 09/09/2024 61.7  38.0 - 73.0 % Final    Lymph % 09/09/2024 28.6  18.0 - 48.0 % Final    Mono % 09/09/2024 8.2  4.0 - 15.0 % Final    Eosinophil % 09/09/2024 0.9  0.0 - 8.0 % Final    Basophil % 09/09/2024 0.3  0.0 - 1.9 % Final    Differential Method 09/09/2024 Automated   Final    Sodium 09/09/2024 139  136 - 145 mmol/L Final    Potassium 09/09/2024 4.0  3.5 - 5.1 mmol/L Final    Chloride 09/09/2024 100  95 - 110 mmol/L Final    CO2 09/09/2024 25  23 - 29 mmol/L Final    Glucose 09/09/2024 109  70 - 110 mg/dL Final    BUN 09/09/2024 43 (H)  8 - 23 mg/dL Final    Creatinine 09/09/2024 2.3 (H)  0.5 - 1.4 mg/dL Final    Calcium 09/09/2024 8.9  8.7 - 10.5 mg/dL Final    Total Protein 09/09/2024 6.4  6.0 - 8.4 g/dL Final    Albumin 09/09/2024 2.9 (L)  3.5 - 5.2 g/dL Final    Total Bilirubin 09/09/2024 1.1 (H)  0.1 - 1.0 mg/dL Final    Comment: For infants and newborns, interpretation of results should be based  on gestational age, weight and in agreement with clinical  observations.    Premature Infant recommended reference ranges:  Up to 24 hours.............<8.0 mg/dL  Up to 48 hours............<12.0 mg/dL  3-5 days..................<15.0 mg/dL  6-29 days.................<15.0 mg/dL      Alkaline Phosphatase 09/09/2024 124  55 - 135 U/L Final    AST 09/09/2024 19  10 - 40 U/L Final    ALT 09/09/2024 13  10 - 44 U/L Final    eGFR 09/09/2024 28.9 (A)  >60 mL/min/1.73 m^2 Final    Anion Gap 09/09/2024 14  8 - 16 mmol/L Final   Lab Visit on 09/01/2024   Component Date Value Ref Range Status    PSA Diagnostic 09/01/2024 1.1  0.00 - 4.00 ng/mL Final    Comment: The testing method is a chemiluminescent microparticle immunoassay   manufactured by [employer] and performed on the     or   Software 2000 system. Values obtained with different assay manufacturers   for   methods may be different and cannot be used interchangeably.  PSA Expected levels:  Hormonal Therapy: <0.05 ng/ml  Prostatectomy: <0.01 ng/ml  Radiation Therapy: <1.00 ng/ml      WBC 09/01/2024 6.12  3.90 - 12.70 K/uL Final    RBC 09/01/2024 4.04 (L)  4.60 - 6.20 M/uL Final    Hemoglobin 09/01/2024 10.9 (L)  14.0 - 18.0 g/dL Final    Hematocrit 09/01/2024 36.4 (L)  40.0 - 54.0 % Final    MCV 09/01/2024 90  82 - 98 fL Final    MCH 09/01/2024 27.0  27.0 - 31.0 pg Final    MCHC 09/01/2024 29.9 (L)  32.0 - 36.0 g/dL Final    RDW 09/01/2024 15.5 (H)  11.5 - 14.5 % Final    Platelets 09/01/2024 262  150 - 450 K/uL Final    MPV 09/01/2024 11.2  9.2 - 12.9 fL Final    Immature Granulocytes 09/01/2024 0.3  0.0 - 0.5 % Final    Gran # (ANC) 09/01/2024 3.5  1.8 - 7.7 K/uL Final    Immature Grans (Abs) 09/01/2024 0.02  0.00 - 0.04 K/uL Final    Comment: Mild elevation in immature granulocytes is non specific and   can be seen in a variety of conditions including stress response,   acute inflammation, trauma and pregnancy. Correlation with other   laboratory and clinical findings is essential.      Lymph # 09/01/2024 2.0  1.0 - 4.8 K/uL Final    Mono # 09/01/2024 0.4  0.3 - 1.0 K/uL Final    Eos # 09/01/2024 0.1  0.0 - 0.5 K/uL Final    Baso # 09/01/2024 0.05  0.00 - 0.20 K/uL Final    nRBC 09/01/2024 0  0 /100 WBC Final    Gran % 09/01/2024 57.2  38.0 - 73.0 % Final    Lymph % 09/01/2024 33.3  18.0 - 48.0 % Final    Mono % 09/01/2024 6.9  4.0 - 15.0 % Final    Eosinophil % 09/01/2024 1.5  0.0 - 8.0 % Final    Basophil % 09/01/2024 0.8  0.0 - 1.9 % Final    Differential Method 09/01/2024 Automated   Final    Sodium 09/01/2024 139  136 - 145 mmol/L Final    Potassium 09/01/2024 4.5  3.5 - 5.1 mmol/L Final    Chloride 09/01/2024 102  95 - 110 mmol/L Final    CO2 09/01/2024 23  23 - 29 mmol/L Final    Glucose 09/01/2024 135 (H)  70 - 110  mg/dL Final    BUN 09/01/2024 53 (H)  8 - 23 mg/dL Final    Creatinine 09/01/2024 2.6 (H)  0.5 - 1.4 mg/dL Final    Calcium 09/01/2024 8.9  8.7 - 10.5 mg/dL Final    Total Protein 09/01/2024 6.7  6.0 - 8.4 g/dL Final    Albumin 09/01/2024 2.8 (L)  3.5 - 5.2 g/dL Final    Total Bilirubin 09/01/2024 0.7  0.1 - 1.0 mg/dL Final    Comment: For infants and newborns, interpretation of results should be based  on gestational age, weight and in agreement with clinical  observations.    Premature Infant recommended reference ranges:  Up to 24 hours.............<8.0 mg/dL  Up to 48 hours............<12.0 mg/dL  3-5 days..................<15.0 mg/dL  6-29 days.................<15.0 mg/dL      Alkaline Phosphatase 09/01/2024 134  55 - 135 U/L Final    AST 09/01/2024 23  10 - 40 U/L Final    ALT 09/01/2024 15  10 - 44 U/L Final    eGFR 09/01/2024 24.9 (A)  >60 mL/min/1.73 m^2 Final    Anion Gap 09/01/2024 14  8 - 16 mmol/L Final    TSH 09/01/2024 2.763  0.400 - 4.000 uIU/mL Final    Free T4 09/01/2024 1.12  0.71 - 1.51 ng/dL Final   There may be more visits with results that are not included.       EKG  Reviewed    Echo   Results for orders placed or performed during the hospital encounter of 08/04/24   Echo Saline Bubble? No   Result Value Ref Range    BSA 2.2 m2    A4C EF 48 %    LVOT stroke volume 50.22 cm3    LVIDd 4.50 3.5 - 6.0 cm    LV Systolic Volume 44.61 mL    LV Systolic Volume Index 20.3 mL/m2    LVIDs 3.31 2.1 - 4.0 cm    LV Diastolic Volume 92.45 mL    LV Diastolic Volume Index 42.02 mL/m2    LV EDV A4C 156.15 mL    Left Ventricular End Systolic Volume by Teichholz Method 44.61 mL    Left Ventricular End Diastolic Volume by Teichholz Method 92.45 mL    IVS 1.72 (A) 0.6 - 1.1 cm    LVOT diameter 2.13 cm    LVOT area 3.6 cm2    FS 26 (A) 28 - 44 %    Left Ventricle Relative Wall Thickness 0.60 cm    PW 1.35 (A) 0.6 - 1.1 cm    LV mass 285.70 g    LV Mass Index 130 g/m2    MV Peak E Joao 1.22 m/s    TDI LATERAL 0.11  m/s    TDI SEPTAL 0.05 m/s    E/E' ratio 15.25 m/s    MV Peak A Joao 0.98 m/s    TR Max Joao 4.21 m/s    E/A ratio 1.24     E wave deceleration time 131.69 msec    LV SEPTAL E/E' RATIO 24.40 m/s    LV LATERAL E/E' RATIO 11.09 m/s    PV Peak S Joao 0.37 m/s    PV Peak D Joao 0.90 m/s    Pulm vein S/D ratio 0.41     LVOT peak joao 0.89 m/s    Left Ventricular Outflow Tract Mean Velocity 0.57 cm/s    Left Ventricular Outflow Tract Mean Gradient 1.43 mmHg    RV/LV Ratio 0.90 cm    LA size 4.07 cm    Left Atrium Minor Axis 8.05 cm    Left Atrium Major Axis 8.57 cm    RA Major Axis 6.18 cm    AV regurgitation pressure 1/2 time [id number] ms    AR Max Joao 3.09 m/s    AV mean gradient 5 mmHg    AV peak gradient 9 mmHg    Ao peak joao 1.48 m/s    Ao VTI 19.30 cm    LVOT peak VTI 14.10 cm    AV valve area 2.60 cm²    AV Velocity Ratio 0.60     AV index (prosthetic) 0.73     KJ by Velocity Ratio 2.14 cm²    Mr max joao 5.22 m/s    MV mean gradient 2 mmHg    MV peak gradient 5 mmHg    MV stenosis pressure 1/2 time 38.19 ms    MV valve area p 1/2 method 5.76 cm2    MV valve area by continuity eq 2.30 cm2    MV VTI 21.8 cm    Triscuspid Valve Regurgitation Peak Gradient 71 mmHg    PV PEAK VELOCITY 0.90 m/s    PV peak gradient 3 mmHg    Pulmonary Valve Mean Velocity 0.66 m/s    Ao root annulus 3.73 cm    STJ 2.80 cm    Ascending aorta 3.27 cm    IVC diameter 2.07 cm    Mean e' 0.08 m/s    ZLVIDS -2.54     ZLVIDD -5.12     RVDD 4.06 cm    AORTIC VALVE CUSP SEPERATION 2.06 cm    JAKI 50.9 mL/m2    LA Vol 112.01 cm3    LA Vol (MOD) 105.00 cm3    LA WIDTH 3.9 cm    JAKI (MOD) 47.7 mL/m2    RA Width 3.8 cm    TV resting pulmonary artery pressure 79 mmHg    RV TB RVSP 12 mmHg    Est. RA pres 8 mmHg    EF 35 %    Narrative      Left Ventricle: The left ventricle is normal in size. Moderately   increased wall thickness. There is concentric hypertrophy. Moderate global   hypokinesis present. There is moderately reduced systolic  function.   Ejection fraction by visual approximation is 35%. Grade II diastolic   dysfunction. Elevated left ventricular filling pressure. Tissue Doppler   velocity is reduced.    Right Ventricle: Normal right ventricular cavity size. Wall thickness   is normal. Systolic function is normal.    Left Atrium: Left atrium is severely dilated.    Right Atrium: Right atrium is moderately dilated.    Aortic Valve: There is mild aortic valve sclerosis. Mildly restricted   motion.    Mitral Valve: There is moderate regurgitation with a centrally directed   jet.    Tricuspid Valve: There is mild to moderate regurgitation with a   centrally directed jet.    Pulmonary Artery: There is severe pulmonary hypertension. The estimated   pulmonary artery systolic pressure is 79 mmHg.    IVC/SVC: Intermediate venous pressure at 8 mmHg.    Pericardium: There is a small posterior effusion.         Imaging  CT Abdomen Pelvis  Without Contrast    Result Date: 10/8/2024  EXAMINATION: CT ABDOMEN PELVIS WITHOUT CONTRAST CLINICAL HISTORY: Bowel obstruction suspected; TECHNIQUE: Low dose axial images, sagittal and coronal reformations were obtained from the lung bases to the pubic symphysis.  30 mL of oral Omnipaque 350 was administered. COMPARISON: CT abdomen pelvis dated 09/11/2024. FINDINGS: Limited images through the lower chest demonstrate streaky lower lung atelectasis or scarring at the lower lobes with new trace pleural effusions.  Trace anterior pericardial fluid or thickening, similar.  Coronary calcific atherosclerosis. Abdomen and pelvis: The gallbladder is absent with similar extrahepatic ductal prominence.  The liver, spleen (with adjacent splenule), and adrenal glands demonstrate unremarkable noncontrast appearance.  Similar mild nonspecific heterogeneity at the pancreatic head.  Lobular cortical margination at the kidneys with similar left renal cortical hypodensities, some suggesting cysts with others too small to  characterize.  No hydronephrosis.  Urinary bladder is partly decompressed.  Prostate is absent. Prior surgical changes of colectomy with similar presacral stranding.  Right lower quadrant ostomy with similar parastomal hernia.  Stomach is partially decompressed with degree of stranding at the antroduodenal junction.  Nonspecific bowel appearance with mixed air distended and decompressed loops.  New scattered mesenteric stranding and fluid with mild volume ascites.  No free air or pathologic adenopathy.  Prior midline laparotomy scar.  Fat containing left inguinal hernia. Mild lower body wall edema.  Multilevel spine DJD.  No aggressive osseous lesion.     Since comparison CT, new scattered mesenteric stranding with mild volume free fluid. Degree of stranding at the antroduodenal junction potentially contributed by under distension with inflammatory component not excluded.  Further correlation including direct visualization as warranted. Nonspecific bowel appearance with mixed borderline air-distended and decompressed loops.  Component of enteritis possible.  No convincing evidence for bowel obstruction or discrete transition at this time.  Continued close follow-up as warranted. New trace pleural effusions, mild lower body wall edema, and additional findings as above. Electronically signed by: Scott Ward Date:    10/08/2024 Time:    11:21    X-Ray Chest AP Portable    Result Date: 10/7/2024  EXAMINATION: XR CHEST AP PORTABLE CLINICAL HISTORY: CHF; TECHNIQUE: Single frontal view of the chest was performed. COMPARISON: Chest radiograph from 10/02/2024. CT chest from 09/09/2024. FINDINGS: There is a loop recorder device.  There is mild perihilar interstitial prominence.  There is an ill-defined airspace opacity in the left upper lung.  There are calcified pleural plaques.  The pleural spaces are otherwise clear.  The cardiac silhouette is enlarged.  Osseous structures demonstrate degenerative changes.      Perihilar interstitial prominence with an ill-defined airspace opacity in the left upper lung.  Findings are compatible with pulmonary edema/CHF versus pneumonia. Electronically signed by: Antonio Casey Date:    10/07/2024 Time:    20:45    US Upper Extremity Veins Right    Result Date: 10/7/2024  EXAMINATION: US UPPER EXTREMITY VEINS RIGHT CLINICAL HISTORY: swelling; TECHNIQUE: Duplex and color flow Doppler evaluation and dynamic compression was performed of the right upper extremity veins. COMPARISON: None FINDINGS: Central veins: The internal jugular, subclavian, and axillary veins are patent and free of thrombus. Arm veins: The brachial, basilic, and cephalic veins are patent and compressible. Contralateral subclavian/internal jugular veins: The left subclavian and internal jugular veins are patent and free of thrombus. Other findings: Within the right upper arm subcutaneous soft tissues, there is an approximate 3.2 x 0.8 cm slightly lobulated and slightly heterogeneous echogenic area without associated shadowing or significant internal vascularity.  This is also noncompressible.  No surrounding soft tissue hyperemia or significant edema.     No thrombus in central veins of the right upper extremity. Right upper arm 3.2 x 0.8 cm suspected lipoma, as above.  Clinical correlation advised.  Further evaluation/follow-up as warranted. Electronically signed by: Rivas Hathaway MD Date:    10/07/2024 Time:    13:54    X-Ray Forearm Right    Result Date: 10/7/2024  EXAMINATION: XR FOREARM RIGHT CLINICAL HISTORY: Edema, unspecified TECHNIQUE: AP and lateral views of the right forearm were performed. COMPARISON: Bilateral hand series 03/05/2015 FINDINGS: Overall alignment is within normal limits.  No displaced fracture, dislocation or destructive osseous process.  Redemonstrated asymmetric prominent cystic change of the lunate slightly progressed from 2015 but no evidence of collapse or migration of the capitate.   Minimal to mild degenerative change elsewhere about the wrist and partially imaged proximal hand.  Scattered nonspecific soft tissue calcifications about the wrist similar to prior.  There is nonspecific soft tissue swelling overlying the wrist and distal forearm increased from prior.  No subcutaneous emphysema or radiopaque foreign body.     Distal forearm and wrist nonspecific soft tissue swelling from edema or cellulitis, without acute displaced fracture-dislocation identified. Electronically signed by: Rivas Hathaway MD Date:    10/07/2024 Time:    13:43    X-Ray Shoulder Trauma Right    Result Date: 10/7/2024  EXAMINATION: XR SHOULDER TRAUMA 3 VIEW RIGHT CLINICAL HISTORY: Pain, unspecified TECHNIQUE: Three or four views of the right shoulder were performed. COMPARISON: Right shoulder series 01/27/2015, chest radiograph 10/02/2024 FINDINGS: Generalized osteopenia.  Overall alignment is within normal limits. No displaced fracture, dislocation or destructive osseous process.  Mild degenerative change about the shoulder.  No subcutaneous emphysema or radiopaque foreign body.  Unchanged small calcification at the right axilla.  Right lung apex is clear.     No acute displaced fracture-dislocation identified. Electronically signed by: Rivas Hathaway MD Date:    10/07/2024 Time:    13:40    X-Ray Chest AP Portable    Result Date: 10/2/2024  EXAMINATION: XR CHEST AP PORTABLE CLINICAL HISTORY: Chest Pain; TECHNIQUE: Single frontal view of the chest was performed. COMPARISON: 09/18/2024. FINDINGS: There are calcified pleural plaques bilaterally, stable.  Otherwise the lungs are clear.  The pleural spaces are clear.  The cardiac silhouette is enlarged.  There is a loop recorder device.  There are calcifications of the aortic arch.  Osseous structures are intact.     No acute abnormality. Electronically signed by: Antonio Casey Date:    10/02/2024 Time:    20:14    X-Ray Hand 3 View Right    Result Date:  9/24/2024  EXAMINATION: XR HAND COMPLETE 3 VIEW RIGHT CLINICAL HISTORY: Pain in right hand TECHNIQUE: PA, lateral, and oblique views of the right hand were performed. COMPARISON: 06/24/2024 FINDINGS: No fracture or dislocation.  Again demonstrated is chondrocalcinosis of the MCP joints and of the carpus.  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  There is mild osteoarthritis of the 1st MCP joint and of the 1st carpometacarpal joint. Again demonstrated are lucencies within the carpal bones which could be related to underlying degenerative change or could indicate erosions in the setting of inflammatory arthropathy. Diffuse soft tissue swelling is again noted.     See above. Electronically signed by: Tripp Alfaro Date:    09/24/2024 Time:    16:52    X-Ray Chest 1 View    Result Date: 9/18/2024  EXAMINATION: XR CHEST 1 VIEW CLINICAL HISTORY: Hypotension, unspecified TECHNIQUE: Single frontal view of the chest was performed. COMPARISON: 09/10/2024. FINDINGS: There is a loop recorder device.  The lungs are well expanded.  There are calcified pleural plaques which can be seen with priors pes dose exposure.  There is a small left pleural effusion.  There is left basilar atelectasis.  Pulmonary parenchyma is unchanged from prior.  No new focal consolidation.  The cardiac silhouette is enlarged.  There are calcifications of the aortic arch.  Osseous structures are intact.     No acute cardiopulmonary abnormality or significant detrimental change from prior. Electronically signed by: Antonio Casey Date:    09/18/2024 Time:    20:07      Prior coronary angiogram / intervention:  As above    Assessment and Plan  Bilateral LE edema  Trace edema today, blisters noted  Encouraged compression stockings and elevation of LEs while sitting and standing, verbalized understanding   May not respond well to lasix given history of hypotension when taking in the past for BLE  edema  Reports having an upcoming appointment with podiary    Hypertensive chronic kidney disease with stage 5 chronic kidney disease or end stage renal disease  BP good today and reports asymptomatic  Continue current medication regimen  Also being followed by nephrology    Mixed hyperlipidemia/Aortic atherosclerosis  Continue statin    Coronary artery disease involving native coronary artery of native heart without angina pectoris/Presence of arterial stent  Continue aspirin 81 and Pravachol 40  Evidently there are allergies to Crestor and Lipitor. These allergies are unknown.    Syncope/orthostatic hypotension/ autonomic dysfunction  Follow up with Neurology, Nephrology, Endocrinology, Gastroenterology, PCP  The patient was getting scheduled IVF infusions   Apparently neurology has sent a trial of mestinon  Outpatient medications previously included midodrine PRN 10 t.i.d., pyridostigmine, fludrocortisone daily previously   Continues to do well with Midodrine TID    Hypertensive urgency/HTN  On most recent hospital admission  BP good today and reports asymptomatic  Continue current medication regimen    Elevated troponin  During recent hospital admission, likely demand  Plan as above    HFrEF (heart failure with reduced ejection fraction)  Unable to tolerate ACEi/ARB or Entresto due to hypotensive side effects  Continue midodrine to keep blood pressure normalized, hydralazine as needed for rebound hypertension  Doing well with Toprol 25  Advise Lasix for lower extremity edema for 3 days, instructed patient to monitor blood pressure and decrease swelling, verbalized understanding  Advised daily weights  Encourage low-sodium diets and 1.5-2 L per day fluid restriction    PAF (paroxysmal atrial fibrillation)  Followed by EP recent ablation see above for details  On AC  Doing well with Toprol  Currently normal sinus rhythm    Paroxysmal ventricular tachycardia  Currently in NSR  Asymptomatic at this time    History  of DVT (deep vein thrombosis)  Currently on Eliquis    Ascending thoracic aortic aneurysm  Surveillance control blood pressure  Add back Toprol  Followed by CT surgery    Follow Up  Cardiology outpatient in 6 mos, or sooner for new or worsening symptoms.       Brandi R. Carter, FNP-C [facility] - Cardiology    Total professional time spent for the encounter: 40 minutes  Time was spent preparing to see the patient, reviewing results of prior testing, obtaining and/or reviewing separately obtained history, performing a medically appropriate examination and interview, counseling and educating the patient/family, ordering medications/tests/procedures, referring and communicating with other health care professionals, documenting clinical information in the electronic health record, and independently interpreting results.

## 2024-10-21 ENCOUNTER — OUTPATIENT CASE MANAGEMENT (OUTPATIENT)
Dept: ADMINISTRATIVE | Facility: OTHER | Age: 74
End: 2024-10-21
Payer: MEDICARE

## 2024-10-28 ENCOUNTER — TELEPHONE (OUTPATIENT)
Dept: PRIMARY CARE CLINIC | Facility: CLINIC | Age: 74
End: 2024-10-28
Payer: MEDICARE

## 2024-10-28 ENCOUNTER — PATIENT MESSAGE (OUTPATIENT)
Dept: FAMILY MEDICINE | Facility: CLINIC | Age: 74
End: 2024-10-28
Payer: MEDICARE

## 2024-10-28 PROBLEM — I50.43 ACUTE ON CHRONIC COMBINED SYSTOLIC AND DIASTOLIC HEART FAILURE: Status: RESOLVED | Noted: 2024-09-11 | Resolved: 2024-10-28

## 2024-10-28 PROBLEM — R60.0 LOWER EXTREMITY EDEMA: Status: ACTIVE | Noted: 2024-10-28

## 2024-10-28 PROBLEM — I50.23 ACUTE ON CHRONIC HFREF (HEART FAILURE WITH REDUCED EJECTION FRACTION): Status: RESOLVED | Noted: 2024-08-06 | Resolved: 2024-10-28

## 2024-10-29 ENCOUNTER — OUTPATIENT CASE MANAGEMENT (OUTPATIENT)
Dept: ADMINISTRATIVE | Facility: OTHER | Age: 74
End: 2024-10-29
Payer: MEDICARE

## 2024-10-31 ENCOUNTER — LAB VISIT (OUTPATIENT)
Dept: LAB | Facility: HOSPITAL | Age: 74
End: 2024-10-31
Payer: MEDICARE

## 2024-10-31 ENCOUNTER — OFFICE VISIT (OUTPATIENT)
Dept: INTERNAL MEDICINE | Facility: CLINIC | Age: 74
End: 2024-10-31
Payer: MEDICARE

## 2024-10-31 VITALS
OXYGEN SATURATION: 95 % | HEART RATE: 64 BPM | DIASTOLIC BLOOD PRESSURE: 72 MMHG | HEIGHT: 74 IN | SYSTOLIC BLOOD PRESSURE: 100 MMHG | WEIGHT: 175.69 LBS | BODY MASS INDEX: 22.55 KG/M2

## 2024-10-31 DIAGNOSIS — Z09 HOSPITAL DISCHARGE FOLLOW-UP: Primary | ICD-10-CM

## 2024-10-31 DIAGNOSIS — I50.43 ACUTE ON CHRONIC COMBINED SYSTOLIC AND DIASTOLIC HEART FAILURE: ICD-10-CM

## 2024-10-31 DIAGNOSIS — R05.9 COUGH, UNSPECIFIED TYPE: ICD-10-CM

## 2024-10-31 LAB
ALBUMIN SERPL BCP-MCNC: 3.1 G/DL (ref 3.5–5.2)
ALP SERPL-CCNC: 139 U/L (ref 40–150)
ALT SERPL W/O P-5'-P-CCNC: 22 U/L (ref 10–44)
ANION GAP SERPL CALC-SCNC: 12 MMOL/L (ref 8–16)
AST SERPL-CCNC: 18 U/L (ref 10–40)
BILIRUB SERPL-MCNC: 1.4 MG/DL (ref 0.1–1)
BNP SERPL-MCNC: 2139 PG/ML (ref 0–99)
BUN SERPL-MCNC: 73 MG/DL (ref 8–23)
CALCIUM SERPL-MCNC: 9 MG/DL (ref 8.7–10.5)
CHLORIDE SERPL-SCNC: 108 MMOL/L (ref 95–110)
CO2 SERPL-SCNC: 17 MMOL/L (ref 23–29)
CREAT SERPL-MCNC: 2.9 MG/DL (ref 0.5–1.4)
CTP QC/QA: YES
CTP QC/QA: YES
EST. GFR  (NO RACE VARIABLE): 21.9 ML/MIN/1.73 M^2
FLUAV AG NPH QL: NEGATIVE
FLUBV AG NPH QL: NEGATIVE
GLUCOSE SERPL-MCNC: 77 MG/DL (ref 70–110)
POTASSIUM SERPL-SCNC: 5.1 MMOL/L (ref 3.5–5.1)
PROT SERPL-MCNC: 6.4 G/DL (ref 6–8.4)
SARS-COV-2 RDRP RESP QL NAA+PROBE: NEGATIVE
SODIUM SERPL-SCNC: 137 MMOL/L (ref 136–145)

## 2024-10-31 PROCEDURE — 83880 ASSAY OF NATRIURETIC PEPTIDE: CPT | Mod: HCNC

## 2024-10-31 PROCEDURE — 99999 PR PBB SHADOW E&M-EST. PATIENT-LVL V: CPT | Mod: PBBFAC,HCNC,GC,

## 2024-10-31 PROCEDURE — 80053 COMPREHEN METABOLIC PANEL: CPT | Mod: HCNC

## 2024-10-31 PROCEDURE — 36415 COLL VENOUS BLD VENIPUNCTURE: CPT | Mod: HCNC

## 2024-10-31 RX ORDER — COLCHICINE 0.6 MG/1
0.6 TABLET ORAL DAILY
Qty: 30 TABLET | Refills: 1 | Status: SHIPPED | OUTPATIENT
Start: 2024-10-31 | End: 2025-10-31

## 2024-10-31 RX ORDER — FUROSEMIDE 40 MG/1
40 TABLET ORAL 2 TIMES DAILY
Qty: 60 TABLET | Refills: 2 | Status: SHIPPED | OUTPATIENT
Start: 2024-10-31

## 2024-11-01 ENCOUNTER — TELEPHONE (OUTPATIENT)
Dept: PRIMARY CARE CLINIC | Facility: CLINIC | Age: 74
End: 2024-11-01
Payer: MEDICARE

## 2024-11-01 PROCEDURE — G0180 MD CERTIFICATION HHA PATIENT: HCPCS | Mod: ,,, | Performed by: INTERNAL MEDICINE

## 2024-11-06 ENCOUNTER — OFFICE VISIT (OUTPATIENT)
Dept: INTERNAL MEDICINE | Facility: CLINIC | Age: 74
End: 2024-11-06
Payer: MEDICARE

## 2024-11-06 VITALS
HEIGHT: 74 IN | BODY MASS INDEX: 22.49 KG/M2 | OXYGEN SATURATION: 98 % | SYSTOLIC BLOOD PRESSURE: 120 MMHG | DIASTOLIC BLOOD PRESSURE: 78 MMHG | WEIGHT: 175.25 LBS | HEART RATE: 106 BPM

## 2024-11-06 DIAGNOSIS — I95.9 HYPOTENSION, UNSPECIFIED HYPOTENSION TYPE: ICD-10-CM

## 2024-11-06 DIAGNOSIS — E11.22 TYPE 2 DIABETES MELLITUS WITH STAGE 3B CHRONIC KIDNEY DISEASE, WITHOUT LONG-TERM CURRENT USE OF INSULIN: ICD-10-CM

## 2024-11-06 DIAGNOSIS — D50.9 IRON DEFICIENCY ANEMIA, UNSPECIFIED IRON DEFICIENCY ANEMIA TYPE: ICD-10-CM

## 2024-11-06 DIAGNOSIS — I48.0 PAF (PAROXYSMAL ATRIAL FIBRILLATION): ICD-10-CM

## 2024-11-06 DIAGNOSIS — C61 PROSTATE CANCER: ICD-10-CM

## 2024-11-06 DIAGNOSIS — G63 POLYNEUROPATHY ASSOCIATED WITH UNDERLYING DISEASE: ICD-10-CM

## 2024-11-06 DIAGNOSIS — I82.412 ACUTE DEEP VEIN THROMBOSIS (DVT) OF FEMORAL VEIN OF LEFT LOWER EXTREMITY: ICD-10-CM

## 2024-11-06 DIAGNOSIS — H40.9 GLAUCOMA, UNSPECIFIED GLAUCOMA TYPE, UNSPECIFIED LATERALITY: Primary | ICD-10-CM

## 2024-11-06 DIAGNOSIS — Z87.898 HISTORY OF ELEVATED PSA: ICD-10-CM

## 2024-11-06 DIAGNOSIS — I50.20 HFREF (HEART FAILURE WITH REDUCED EJECTION FRACTION): ICD-10-CM

## 2024-11-06 DIAGNOSIS — Z87.39 HISTORY OF GOUT: ICD-10-CM

## 2024-11-06 DIAGNOSIS — N18.30 STAGE 3 CHRONIC KIDNEY DISEASE, UNSPECIFIED WHETHER STAGE 3A OR 3B CKD: ICD-10-CM

## 2024-11-06 DIAGNOSIS — N18.32 TYPE 2 DIABETES MELLITUS WITH STAGE 3B CHRONIC KIDNEY DISEASE, WITHOUT LONG-TERM CURRENT USE OF INSULIN: ICD-10-CM

## 2024-11-06 DIAGNOSIS — Z93.2 ILEOSTOMY IN PLACE: ICD-10-CM

## 2024-11-06 PROCEDURE — 3078F DIAST BP <80 MM HG: CPT | Mod: HCNC,CPTII,S$GLB, | Performed by: FAMILY MEDICINE

## 2024-11-06 PROCEDURE — 3044F HG A1C LEVEL LT 7.0%: CPT | Mod: HCNC,CPTII,S$GLB, | Performed by: FAMILY MEDICINE

## 2024-11-06 PROCEDURE — 99215 OFFICE O/P EST HI 40 MIN: CPT | Mod: HCNC,S$GLB,, | Performed by: FAMILY MEDICINE

## 2024-11-06 PROCEDURE — 1111F DSCHRG MED/CURRENT MED MERGE: CPT | Mod: HCNC,CPTII,S$GLB, | Performed by: FAMILY MEDICINE

## 2024-11-06 PROCEDURE — 1159F MED LIST DOCD IN RCRD: CPT | Mod: HCNC,CPTII,S$GLB, | Performed by: FAMILY MEDICINE

## 2024-11-06 PROCEDURE — 1126F AMNT PAIN NOTED NONE PRSNT: CPT | Mod: HCNC,CPTII,S$GLB, | Performed by: FAMILY MEDICINE

## 2024-11-06 PROCEDURE — 3060F POS MICROALBUMINURIA REV: CPT | Mod: HCNC,CPTII,S$GLB, | Performed by: FAMILY MEDICINE

## 2024-11-06 PROCEDURE — 3074F SYST BP LT 130 MM HG: CPT | Mod: HCNC,CPTII,S$GLB, | Performed by: FAMILY MEDICINE

## 2024-11-06 PROCEDURE — 3066F NEPHROPATHY DOC TX: CPT | Mod: HCNC,CPTII,S$GLB, | Performed by: FAMILY MEDICINE

## 2024-11-06 PROCEDURE — 3288F FALL RISK ASSESSMENT DOCD: CPT | Mod: HCNC,CPTII,S$GLB, | Performed by: FAMILY MEDICINE

## 2024-11-06 PROCEDURE — G2211 COMPLEX E/M VISIT ADD ON: HCPCS | Mod: HCNC,S$GLB,, | Performed by: FAMILY MEDICINE

## 2024-11-06 PROCEDURE — 99999 PR PBB SHADOW E&M-EST. PATIENT-LVL IV: CPT | Mod: PBBFAC,HCNC,, | Performed by: FAMILY MEDICINE

## 2024-11-06 PROCEDURE — 1101F PT FALLS ASSESS-DOCD LE1/YR: CPT | Mod: HCNC,CPTII,S$GLB, | Performed by: FAMILY MEDICINE

## 2024-11-06 RX ORDER — FERROUS SULFATE 325(65) MG
325 TABLET ORAL EVERY OTHER DAY
Qty: 45 TABLET | Refills: 3 | Status: SHIPPED | OUTPATIENT
Start: 2024-11-06 | End: 2025-11-06

## 2024-11-06 RX ORDER — MIDODRINE HYDROCHLORIDE 10 MG/1
10 TABLET ORAL
Qty: 270 TABLET | Refills: 3 | Status: SHIPPED | OUTPATIENT
Start: 2024-11-06 | End: 2025-11-06

## 2024-11-06 RX ORDER — GABAPENTIN 300 MG/1
300 CAPSULE ORAL 2 TIMES DAILY
COMMUNITY

## 2024-11-06 RX ORDER — BRIMONIDINE TARTRATE 2 MG/ML
1 SOLUTION/ DROPS OPHTHALMIC 2 TIMES DAILY
Qty: 60 ML | Refills: 3 | Status: SHIPPED | OUTPATIENT
Start: 2024-11-06

## 2024-11-06 RX ORDER — ISOPROPYL ALCOHOL 70 ML/100ML
SWAB TOPICAL
Refills: 3 | OUTPATIENT
Start: 2024-11-06

## 2024-11-06 NOTE — PROGRESS NOTES
Subjective:     Patient ID: Jarrett Charlton Jr. is a 74 y.o. male.   Chief Complaint: Establish Care and Annual Exam    HPI:  History of Present Illness    CHIEF COMPLAINT:  Patient presents today for establishing care with a new PCP.    MEDICAL HISTORY:  He has a complex medical history including atrial fibrillation, venous thromboembolism, ulcerative colitis (status post ileostomy), gout, prostate cancer, congestive heart failure, diabetes mellitus, chronic kidney disease, and anemia. He was diagnosed with prostate cancer approximately six years ago. He reports fluctuations in his atrial fibrillation and blood pressure, which he manages with midodrine. He sees a nephrologist regularly and has had recent medication changes due to kidney issues. His diabetes is reportedly well-controlled.    ALLERGIES:  He reports allergies to atorvastatin and rosuvastatin, though he does not recall the specific reactions.    RECENT SYMPTOMS:  He reports a recent gout flare-up requiring prednisone treatment, noting that gout tends to flare without medication. He has allopurinol and colchicine on his medication list and he reports these were discontinued by one of his specialists, presumable for concerns about declining renal function. He also complains of current leg swelling without associated pain. He mentions a recent episode of tinnitus that has since resolved.         Review of Problems & History:  Patient Active Problem List   Diagnosis    T2DM (type 2 diabetes mellitus)    Essential hypertension    Chronic renal impairment    HLD (hyperlipidemia)    BPH (benign prostatic hyperplasia)    Disc disease, degenerative, cervical    GERD (gastroesophageal reflux disease)    History of ulcerative colitis    History of anemia due to CKD    Proteinuria    DDD (degenerative disc disease)    Generalized osteoarthritis    Elevated PSA    S/P TURP    CAD (coronary artery disease)    Presence of arterial stent    Hypotension    Abnormal  LFTs (liver function tests)    SBO (small bowel obstruction)    High output ileostomy    History of gout    History of elevated PSA    History of BPH    Bowel obstruction    Parastomal hernia with obstruction and without gangrene    Incisional hernia, without obstruction or gangrene    Hyperkalemia    Metabolic acidosis with normal anion gap and bicarbonate losses    Pleural plaque    Aortic atherosclerosis    Type 2 diabetes mellitus with diabetic polyneuropathy, without long-term current use of insulin    Ileostomy in place    Status post repair of ventral hernia    Parotid mass    Prostate cancer    Polyneuropathy associated with underlying disease    CKD stage 4 due to type 2 diabetes mellitus    Paroxysmal ventricular tachycardia    Colostomy present    Dehydration    Abnormal EKG    Aortic aneurysm    Bifascicular block    Prolonged QT interval    Acute cholecystitis with chronic cholecystitis    Headache    Hyponatremia    Blurred vision, right eye    Bleeding from wound    Suicidal ideation    Elevated troponin    Dizziness    Hypertensive urgency    Cellulitis of left foot    Carcinoid syndrome    Major depressive disorder, single episode, severe without psychotic features    Secondary hyperparathyroidism    Idiopathic chronic gout of multiple sites without tophus    Hypertensive chronic kidney disease with stage 5 chronic kidney disease or end stage renal disease    Atrial fibrillation with RVR    Dysautonomia    Chronic cough    Pulmonary HTN    COVID-19 virus infection    HFrEF (heart failure with reduced ejection fraction)    Chronic kidney disease, stage 3 unspecified    Atrial fibrillation    Acute on chronic combined systolic and diastolic heart failure    Urinary retention    Right hand pain    Deep vein thrombosis    Congestive heart failure    Anemia    History of DVT (deep vein thrombosis)    PAF (paroxysmal atrial fibrillation)    Moderate malnutrition    Bilateral leg edema    Thrombocytopenia,  unspecified    Lower extremity edema      Past Medical History:   Diagnosis Date    Anticoagulant long-term use     Basal cell carcinoma     BPH (benign prostatic hyperplasia)     s/p TURP    Carotid stenosis     Chronic kidney disease     Claudication     Coronary artery disease     DDD (degenerative disc disease) 10/21/2013    Diabetes mellitus with renal complications     Disc disease, degenerative, cervical     DVT (deep venous thrombosis)     Encounter for blood transfusion     GERD (gastroesophageal reflux disease)     Gout, chronic     Heart failure, unspecified     History of ulcerative colitis     s/p colectomy and ileostomy    HLD (hyperlipidemia)     HTN (hypertension)     Ileostomy in place 1982    Paroxysmal atrial fibrillation     RBBB     Squamous cell carcinoma 03/08/2018    Left superior helix near insertion    Squamous cell carcinoma 04/12/2018    Left forearm x 5    Syncope 07/06/2024    Ventricular tachycardia       Past Surgical History:   Procedure Laterality Date    ABLATION, SVT, ACCESSORY PATHWAY N/A 4/30/2024    Procedure: Ablation, SVT, Accessory Pathway;  Surgeon: Franky Taylor MD;  Location: [facility] EP LAB;  Service: Cardiology;  Laterality: N/A;  VT, RFA, Carto, MAC, GP, 3 Prep *MDT ILR in situ*    cardiac stents      CATARACT EXTRACTION Bilateral     CERVICAL FUSION      CHOLECYSTECTOMY N/A 2/14/2023    Procedure: CHOLECYSTECTOMY;  Surgeon: Mike Joyce MD;  Location: [facility] OR;  Service: General;  Laterality: N/A;  very high probabilty of converison to open    colectomy and ileostomy  1985    ENDOSCOPIC ULTRASOUND OF UPPER GASTROINTESTINAL TRACT N/A 2/10/2023    Procedure: ULTRASOUND, UPPER GI TRACT, ENDOSCOPIC;  Surgeon: Poli Fuller MD;  Location: [facility] ENDO;  Service: Endoscopy;  Laterality: N/A;    ERCP N/A 2/10/2023    Procedure: ERCP (ENDOSCOPIC RETROGRADE CHOLANGIOPANCREATOGRAPHY);  Surgeon: Poli Fuller MD;  Location: [facility] ENDO;  Service: Endoscopy;  Laterality: N/A;     EXCISION OF PAROTID GLAND Left 12/18/2020    Procedure: EXCISION, PAROTID GLAND;  Surgeon: Michael Pinzon MD;  Location: [facility] OR 2ND FLR;  Service: ENT;  Laterality: Left;    INSERTION OF IMPLANTABLE LOOP RECORDER  06/07/2021    INSERTION OF IMPLANTABLE LOOP RECORDER Left 6/7/2021    Procedure: INSERTION, IMPLANTABLE LOOP RECORDER;  Surgeon: Romario Dao MD;  Location: [facility] CATH LAB;  Service: Cardiology;  Laterality: Left;    LEFT HEART CATHETERIZATION Right 4/15/2021    Procedure: CATHETERIZATION, HEART, LEFT;  Surgeon: Marcio Jones MD;  Location: [facility] CATH LAB;  Service: Cardiology;  Laterality: Right;    LYSIS OF ADHESIONS N/A 11/9/2020    Procedure: LYSIS, ADHESIONS,  ERAS low;  Surgeon: CED Haley MD;  Location: [facility] OR 2ND FLR;  Service: Colon and Rectal;  Laterality: N/A;    LYSIS OF ADHESIONS N/A 2/14/2023    Procedure: LYSIS, ADHESIONS;  Surgeon: Mike Joyce MD;  Location: [facility];  Service: General;  Laterality: N/A;    POUCHOSCOPY N/A 4/6/2022    Procedure: ENDOSCOPY, POUCH, SMALL INTESTINE, DIAGNOSTIC;  Surgeon: Dieter Juarez MD;  Location: [facility] ENDO (2ND FLR);  Service: Endoscopy;  Laterality: N/A;    REPAIR, HERNIA, PARASTOMAL N/A 11/9/2020    Procedure: REPAIR, HERNIA, PARASTOMAL;  Surgeon: CED Haley MD;  Location: [facility] OR 2ND FLR;  Service: Colon and Rectal;  Laterality: N/A;    TRANSURETHRAL RESECTION OF PROSTATE (TURP) WITHOUT USE OF LASER N/A 1/23/2019    Procedure: TURP, WITHOUT USING LASER BIPOLAR;  Surgeon: Catarino Mota MD;  Location: [facility] OR 1ST FLR;  Service: Urology;  Laterality: N/A;  1.5 HOURS    TREATMENT OF CARDIAC ARRHYTHMIA  4/30/2024    Procedure: Cardioversion or Defibrillation;  Surgeon: Franky Taylor MD;  Location: [facility] EP LAB;  Service: Cardiology;;      Social History     Socioeconomic History    Marital status:     Number of children: 1   Occupational History    Occupation:  x 44 years     Comment:  Retired    Occupation: [place]    Tobacco Use    Smoking status: Never     Passive exposure: Never    Smokeless tobacco: Never   Substance and Sexual Activity    Alcohol use: No    Drug use: No    Sexual activity: Not Currently     Partners: Female     Social Drivers of Health     Financial Resource Strain: Low Risk  (10/25/2024)    Overall Financial Resource Strain (CARDIA)     Difficulty of Paying Living Expenses: Not hard at all   Food Insecurity: No Food Insecurity (10/25/2024)    Hunger Vital Sign     Worried About Running Out of Food in the Last Year: Never true     Ran Out of Food in the Last Year: Never true   Transportation Needs: No Transportation Needs (10/25/2024)    TRANSPORTATION NEEDS     Transportation : No   Physical Activity: Sufficiently Active (10/3/2024)    Exercise Vital Sign     Days of Exercise per Week: 7 days     Minutes of Exercise per Session: 150+ min   Recent Concern: Physical Activity - Inactive (8/29/2024)    Exercise Vital Sign     Days of Exercise per Week: 0 days     Minutes of Exercise per Session: 0 min   Stress: No Stress Concern Present (10/25/2024)    [employer] - Occupational Stress Questionnaire     Feeling of Stress : Not at all   Recent Concern: Stress - Stress Concern Present (10/8/2024)    [employer] - Occupational Stress Questionnaire     Feeling of Stress : To some extent   Housing Stability: Low Risk  (10/25/2024)    Housing Stability Vital Sign     Unable to Pay for Housing in the Last Year: No     Homeless in the Last Year: No        Medication Review:    Current Outpatient Medications:     ACCU-CHEK PAUL CONTROL SOLN Soln, 1 drop by NOT APPLICABLE route once daily., Disp: , Rfl:     ACCU-CHEK GUIDE TEST STRIPS Strp, TEST BLOOD SUGAR THREE TIMES DAILY, Disp: 300 strip, Rfl: 10    ACCU-CHEK SOFTCLIX LANCETS Misc, TEST BLOOD SUGAR THREE TIMES DAILY, Disp: 300 each, Rfl: 3    apixaban (ELIQUIS) 5 mg  Tab, Take 1 tablet (5 mg total) by mouth 2 (two) times daily., Disp: 60 tablet, Rfl: 2    aspirin (ECOTRIN) 81 MG EC tablet, Take 1 tablet (81 mg total) by mouth once daily., Disp: 30 tablet, Rfl: 11    blood-glucose meter (ACCU-CHEK GUIDE GLUCOSE METER) Misc, Accucheck BID, Disp: 1 each, Rfl: 0    DROPSAFE ALCOHOL PREP PADS PadM, USE TOPICALLY 5 TIMES DAILY., Disp: 500 each, Rfl: 3    furosemide (LASIX) 40 MG tablet, Take 1 tablet (40 mg total) by mouth 2 (two) times daily., Disp: 60 tablet, Rfl: 2    glimepiride (AMARYL) 4 MG tablet, TAKE 1 TABLET TWICE DAILY BEFORE MEALS, Disp: 180 tablet, Rfl: 0    HYDROcodone-acetaminophen (NORCO) 5-325 mg per tablet, Take 1 tablet by mouth every 12 (twelve) hours as needed for Pain., Disp: 20 tablet, Rfl: 0    LIDOcaine (LIDODERM) 5 %, Place 1 patch onto the skin once daily. Remove & Discard patch within 12 hours or as directed by MD, Disp: 30 patch, Rfl: 0    magnesium oxide (MAG-OX) 400 mg (241.3 mg magnesium) tablet, Take 400 mg by mouth once daily., Disp: , Rfl:     metoprolol succinate (TOPROL-XL) 25 MG 24 hr tablet, Take 1 tablet (25 mg total) by mouth once daily., Disp: 90 tablet, Rfl: 3    pantoprazole (PROTONIX) 40 MG tablet, TAKE 1 TABLET ONE TIME DAILY, Disp: 90 tablet, Rfl: 3    pravastatin (PRAVACHOL) 40 MG tablet, Take 1 tablet (40 mg total) by mouth once daily., Disp: 90 tablet, Rfl: 3    sodium bicarbonate 650 MG tablet, Take 2 tablets (1,300 mg total) by mouth 2 (two) times daily., Disp: 360 tablet, Rfl: 3    tamsulosin (FLOMAX) 0.4 mg Cap, Take 1 capsule (0.4 mg total) by mouth once daily., Disp: 90 capsule, Rfl: 3    allopurinoL (ZYLOPRIM) 100 MG tablet, Take 0.5 tablets (50 mg total) by mouth every other day. (Patient not taking: Reported on 11/6/2024), Disp: 8 tablet, Rfl: 0    brimonidine 0.2% (ALPHAGAN) 0.2 % Drop, Place 1 drop into both eyes 2 (two) times a day., Disp: 60 mL, Rfl: 3    colchicine (COLCRYS) 0.6 mg tablet, Take 1 tablet (0.6 mg total) by  "mouth once daily. (Patient not taking: Reported on 11/6/2024), Disp: 30 tablet, Rfl: 1    ferrous sulfate (IRON) 325 mg (65 mg iron) Tab tablet, Take 1 tablet (325 mg total) by mouth every other day., Disp: 45 tablet, Rfl: 3    gabapentin (NEURONTIN) 300 MG capsule, Take 300 mg by mouth 2 (two) times daily., Disp: , Rfl:     midodrine (PROAMATINE) 10 MG tablet, Take 1 tablet (10 mg total) by mouth 3 (three) times daily with meals., Disp: 270 tablet, Rfl: 3    Current Facility-Administered Medications:     leuprolide acetate (6 month) injection 45 mg, 45 mg, Intramuscular, Q6 Months, , 45 mg at 05/14/24 0941    Facility-Administered Medications Ordered in Other Visits:     sodium chloride 0.9% in 1,000 mL infusion, , Intravenous, Continuous, Order, Paper     Review of Systems   Constitutional:  Negative for chills, fatigue and fever.   HENT:  Negative for nasal congestion, ear pain, postnasal drip and sore throat.    Eyes:  Negative for visual disturbance.   Respiratory:  Negative for cough, shortness of breath and wheezing.    Cardiovascular:  Negative for chest pain and palpitations.   Gastrointestinal:  Negative for abdominal pain, change in bowel habit, constipation, diarrhea, nausea and vomiting.   Genitourinary:  Negative for dysuria and hematuria.   Musculoskeletal:  Negative for back pain, leg pain and neck pain.   Neurological:  Negative for dizziness, numbness and headaches.   Hematological:  Negative for adenopathy.   Psychiatric/Behavioral:  Negative for dysphoric mood, sleep disturbance and suicidal ideas. The patient is not nervous/anxious.           Objective:      Vitals:    11/06/24 0919   BP: 120/78   BP Location: Left arm   Patient Position: Sitting   Pulse: 106   SpO2: 98%   Weight: 79.5 kg (175 lb 4.3 oz)   Height: 6' 2" (1.88 m)      Physical Exam  Vitals reviewed.   Constitutional:       General: He is not in acute distress.     Appearance: Normal appearance. He is normal weight. He is not " ill-appearing.   HENT:      Head: Normocephalic and atraumatic.      Right Ear: Tympanic membrane, ear canal and external ear normal. There is no impacted cerumen.      Left Ear: Tympanic membrane, ear canal and external ear normal. There is no impacted cerumen.      Nose: Nose normal. No congestion or rhinorrhea.      Mouth/Throat:      Mouth: Mucous membranes are moist.      Pharynx: Oropharynx is clear. No oropharyngeal exudate or posterior oropharyngeal erythema.   Eyes:      General: No scleral icterus.        Right eye: No discharge.         Left eye: No discharge.      Conjunctiva/sclera: Conjunctivae normal.   Neck:      Thyroid: No thyroid mass, thyromegaly or thyroid tenderness.   Cardiovascular:      Rate and Rhythm: Normal rate and regular rhythm.      Pulses: Normal pulses.      Heart sounds: Normal heart sounds. No murmur heard.     No friction rub. No gallop.   Pulmonary:      Effort: Pulmonary effort is normal. No respiratory distress.      Breath sounds: Normal breath sounds. No stridor. No wheezing, rhonchi or rales.   Abdominal:      General: Abdomen is flat. Bowel sounds are normal. There is no distension.      Palpations: Abdomen is soft. There is no mass.      Tenderness: There is no abdominal tenderness. There is no guarding.      Hernia: No hernia is present.   Musculoskeletal:         General: No swelling or deformity. Normal range of motion.      Cervical back: Normal range of motion and neck supple. No tenderness.      Right lower le+ Pitting Edema present.      Left lower le+ Pitting Edema present.   Lymphadenopathy:      Cervical: No cervical adenopathy.   Skin:     General: Skin is warm and dry.   Neurological:      General: No focal deficit present.      Mental Status: He is alert and oriented to person, place, and time. Mental status is at baseline.      Gait: Gait normal.      Deep Tendon Reflexes: Reflexes normal.   Psychiatric:         Mood and Affect: Mood normal.          Behavior: Behavior normal.         Thought Content: Thought content normal.         Judgment: Judgment normal.           Assessment/Plan:             ICD-10-CM ICD-9-CM   1. Encounter for annual general medical examination without abnormal findings in adult  Z00.00 V70.0   2. Glaucoma, unspecified glaucoma type, unspecified laterality  H40.9 365.9   3. Polyneuropathy associated with underlying disease  G63 357.4   4. HFrEF (heart failure with reduced ejection fraction)  I50.20 428.20   5. Hypotension, unspecified hypotension type  I95.9 458.9   6. PAF (paroxysmal atrial fibrillation)  I48.0 427.31   7. Stage 3 chronic kidney disease, unspecified whether stage 3a or 3b CKD  N18.30 585.3   8. History of elevated PSA  Z87.898 V13.89   9. Acute deep vein thrombosis (DVT) of femoral vein of left lower extremity  I82.412 453.41   10. Prostate cancer  C61 185   11. Type 2 diabetes mellitus with stage 3b chronic kidney disease, without long-term current use of insulin  E11.22 250.40    N18.32 585.3   12. Ileostomy in place  Z93.2 V44.2   13. History of gout  Z87.39 V12.29   14. Iron deficiency anemia, unspecified iron deficiency anemia type  D50.9 280.9     No orders of the defined types were placed in this encounter.      Assessment & Plan    - Assessed complex medical history including AFib, heart failure, gout, prostate cancer, and chronic kidney disease  - Reviewed recent labs: BMP stable but elevated, uric acid slightly elevated, A1C well-controlled at 6.8  - Noted chronic anemia likely due to anemia of chronic disease, stable and not concerning for acute bleed  - Evaluated recent history of blood clots, noting Eliquis is a new addition to medication regimen  - Considered gout management in context of kidney function, deferring to nephrology for medication adjustments  - Performed physical exam, noting some lower extremity edema but overall stable condition    ANEMIA OF CHRONIC DISEASE:  - Explained anemia of chronic  disease and its relation to complex medical history.  - Continued ferrous sulfate (iron supplement) every other day.    HEART FAILURE:  - Discussed importance of balancing fluid intake with heart failure management.  - Educated on signs of fluid overload to monitor (weight gain, increased leg swelling, shortness of breath).  - Patient to monitor for signs of fluid overload and report any changes to PCP or cardiologist.  - Contact office if experiencing increased swelling, shortness of breath, or weight gain.    GOUT:  - Recommend discussing gout management with nephrologist at upcoming appointment.    ORTHOSTATIC HYPOTENSION:  - Refilled midodrine 10 mg 3 times daily, 90-day supply.    EYE CONDITION:  - Continued eye drops (alphagan) for 90-day supply, 6 bottles.    RECENT BLOOD CLOTS:  - Continued Eliquis for recent blood clots.    MEDICATIONS/SUPPLEMENTS:  - Continued current medications without changes.    FOLLOW UP:  - Follow up in 3 months.                   Follow up in about 3 months (around 2/6/2025).     Tripp Mohan MD, [id number]  Family Medicine Physician  [facility]  11/06/2024    This note was generated with the assistance of ambient listening technology. Verbal consent was obtained by the patient and accompanying visitor(s) for the recording of patient appointment to facilitate this note. I attest to having reviewed and edited the generated note for accuracy, though some syntax or spelling errors may persist. Please contact the author of this note for any clarification.

## 2024-11-07 ENCOUNTER — LAB VISIT (OUTPATIENT)
Dept: LAB | Facility: HOSPITAL | Age: 74
End: 2024-11-07
Attending: HOSPITALIST
Payer: MEDICARE

## 2024-11-07 ENCOUNTER — PATIENT MESSAGE (OUTPATIENT)
Facility: CLINIC | Age: 74
End: 2024-11-07
Payer: MEDICARE

## 2024-11-07 ENCOUNTER — OFFICE VISIT (OUTPATIENT)
Dept: NEPHROLOGY | Facility: CLINIC | Age: 74
End: 2024-11-07
Payer: MEDICARE

## 2024-11-07 VITALS
OXYGEN SATURATION: 98 % | HEART RATE: 77 BPM | HEIGHT: 74 IN | BODY MASS INDEX: 21.93 KG/M2 | DIASTOLIC BLOOD PRESSURE: 66 MMHG | SYSTOLIC BLOOD PRESSURE: 87 MMHG | WEIGHT: 170.88 LBS

## 2024-11-07 DIAGNOSIS — E11.22 CKD STAGE 4 DUE TO TYPE 2 DIABETES MELLITUS: ICD-10-CM

## 2024-11-07 DIAGNOSIS — R80.9 PROTEINURIA, UNSPECIFIED TYPE: ICD-10-CM

## 2024-11-07 DIAGNOSIS — N18.4 CKD STAGE 4 DUE TO TYPE 2 DIABETES MELLITUS: ICD-10-CM

## 2024-11-07 DIAGNOSIS — I50.43 ACUTE ON CHRONIC COMBINED SYSTOLIC AND DIASTOLIC HEART FAILURE: ICD-10-CM

## 2024-11-07 DIAGNOSIS — I10 ESSENTIAL HYPERTENSION: Primary | ICD-10-CM

## 2024-11-07 DIAGNOSIS — E87.20 METABOLIC ACIDOSIS WITH NORMAL ANION GAP AND BICARBONATE LOSSES: ICD-10-CM

## 2024-11-07 DIAGNOSIS — N18.4 STAGE 4 CHRONIC KIDNEY DISEASE: ICD-10-CM

## 2024-11-07 DIAGNOSIS — I70.0 AORTIC ATHEROSCLEROSIS: ICD-10-CM

## 2024-11-07 DIAGNOSIS — I16.0 HYPERTENSIVE URGENCY: ICD-10-CM

## 2024-11-07 LAB
ALBUMIN SERPL BCP-MCNC: 2.9 G/DL (ref 3.5–5.2)
ANION GAP SERPL CALC-SCNC: 12 MMOL/L (ref 8–16)
BUN SERPL-MCNC: 61 MG/DL (ref 8–23)
CALCIUM SERPL-MCNC: 8.4 MG/DL (ref 8.7–10.5)
CHLORIDE SERPL-SCNC: 107 MMOL/L (ref 95–110)
CO2 SERPL-SCNC: 24 MMOL/L (ref 23–29)
CREAT SERPL-MCNC: 2.2 MG/DL (ref 0.5–1.4)
EST. GFR  (NO RACE VARIABLE): 30.5 ML/MIN/1.73 M^2
GLUCOSE SERPL-MCNC: 61 MG/DL (ref 70–110)
PHOSPHATE SERPL-MCNC: 3.9 MG/DL (ref 2.7–4.5)
POTASSIUM SERPL-SCNC: 3.9 MMOL/L (ref 3.5–5.1)
SODIUM SERPL-SCNC: 143 MMOL/L (ref 136–145)

## 2024-11-07 PROCEDURE — 3288F FALL RISK ASSESSMENT DOCD: CPT | Mod: HCNC,CPTII,S$GLB, | Performed by: HOSPITALIST

## 2024-11-07 PROCEDURE — 3060F POS MICROALBUMINURIA REV: CPT | Mod: HCNC,CPTII,S$GLB, | Performed by: HOSPITALIST

## 2024-11-07 PROCEDURE — 80069 RENAL FUNCTION PANEL: CPT | Mod: HCNC | Performed by: HOSPITALIST

## 2024-11-07 PROCEDURE — 99999 PR PBB SHADOW E&M-EST. PATIENT-LVL V: CPT | Mod: PBBFAC,HCNC,, | Performed by: HOSPITALIST

## 2024-11-07 PROCEDURE — 1111F DSCHRG MED/CURRENT MED MERGE: CPT | Mod: HCNC,CPTII,S$GLB, | Performed by: HOSPITALIST

## 2024-11-07 PROCEDURE — 3066F NEPHROPATHY DOC TX: CPT | Mod: HCNC,CPTII,S$GLB, | Performed by: HOSPITALIST

## 2024-11-07 PROCEDURE — G2211 COMPLEX E/M VISIT ADD ON: HCPCS | Mod: HCNC,S$GLB,, | Performed by: HOSPITALIST

## 2024-11-07 PROCEDURE — 1100F PTFALLS ASSESS-DOCD GE2>/YR: CPT | Mod: HCNC,CPTII,S$GLB, | Performed by: HOSPITALIST

## 2024-11-07 PROCEDURE — 1159F MED LIST DOCD IN RCRD: CPT | Mod: HCNC,CPTII,S$GLB, | Performed by: HOSPITALIST

## 2024-11-07 PROCEDURE — 36415 COLL VENOUS BLD VENIPUNCTURE: CPT | Mod: HCNC | Performed by: HOSPITALIST

## 2024-11-07 PROCEDURE — 1126F AMNT PAIN NOTED NONE PRSNT: CPT | Mod: HCNC,CPTII,S$GLB, | Performed by: HOSPITALIST

## 2024-11-07 PROCEDURE — 3078F DIAST BP <80 MM HG: CPT | Mod: HCNC,CPTII,S$GLB, | Performed by: HOSPITALIST

## 2024-11-07 PROCEDURE — 99215 OFFICE O/P EST HI 40 MIN: CPT | Mod: HCNC,S$GLB,, | Performed by: HOSPITALIST

## 2024-11-07 PROCEDURE — 3044F HG A1C LEVEL LT 7.0%: CPT | Mod: HCNC,CPTII,S$GLB, | Performed by: HOSPITALIST

## 2024-11-07 PROCEDURE — 3074F SYST BP LT 130 MM HG: CPT | Mod: HCNC,CPTII,S$GLB, | Performed by: HOSPITALIST

## 2024-11-07 RX ORDER — HEPARIN 100 UNIT/ML
500 SYRINGE INTRAVENOUS
OUTPATIENT
Start: 2024-11-07

## 2024-11-07 RX ORDER — EPINEPHRINE 0.3 MG/.3ML
0.3 INJECTION SUBCUTANEOUS ONCE AS NEEDED
OUTPATIENT
Start: 2024-11-07

## 2024-11-07 RX ORDER — ALLOPURINOL 100 MG/1
100 TABLET ORAL EVERY OTHER DAY
Qty: 30 TABLET | Refills: 4 | Status: SHIPPED | OUTPATIENT
Start: 2024-11-07 | End: 2024-11-07 | Stop reason: SDUPTHER

## 2024-11-07 RX ORDER — SODIUM CHLORIDE 0.9 % (FLUSH) 0.9 %
10 SYRINGE (ML) INJECTION
OUTPATIENT
Start: 2024-11-07

## 2024-11-07 RX ORDER — DIPHENHYDRAMINE HYDROCHLORIDE 50 MG/ML
50 INJECTION INTRAMUSCULAR; INTRAVENOUS ONCE AS NEEDED
OUTPATIENT
Start: 2024-11-07

## 2024-11-07 RX ORDER — ALLOPURINOL 100 MG/1
100 TABLET ORAL EVERY OTHER DAY
Qty: 90 TABLET | Refills: 4 | Status: SHIPPED | OUTPATIENT
Start: 2024-11-07

## 2024-11-07 NOTE — PROGRESS NOTES
"REASON FOR CONSULT/CHIEF COMPLAIN: CKD stage 4     REFERRING PHYSICIAN: Tripp Mohan MD      HISTORY OF PRESENT ILLNESS: 74 y.o. male who is new to me  has a past medical history of Anticoagulant long-term use, Basal cell carcinoma, BPH (benign prostatic hyperplasia), Carotid stenosis, Chronic kidney disease, Claudication, Coronary artery disease, DDD (degenerative disc disease) (10/21/2013), Diabetes mellitus with renal complications, Disc disease, degenerative, cervical, DVT (deep venous thrombosis), Encounter for blood transfusion, GERD (gastroesophageal reflux disease), Gout, chronic, Heart failure, unspecified, History of ulcerative colitis, HLD (hyperlipidemia), HTN (hypertension), Ileostomy in place (1982), Paroxysmal atrial fibrillation, RBBB, Squamous cell carcinoma (03/08/2018), Squamous cell carcinoma (04/12/2018), Syncope (07/06/2024), and Ventricular tachycardia. patient was referred here for abnormal renal function ranging from 2.8-2.9 mg/dL    HPI    Last seen by Dr Logan in 2/2024. Previously seen by  Bianka and Dr. Lyon in Aug 2019.       Patient presents for f/u of CKD.  Baseline creatinine had been 1.3-1.5 c frequent increases to 1.6-1.8 range an occasional AKIs c higher sCr. Had GRADY in late December 2019 c peak sCr of 2.3 mg/dL 2/2 volume depletion from high output in ileostomy. Has had hypomagnesemia in the past.  Had K of 5.9 in November; provided with K finder. Since December 2019, new sCr baseline 1.4-1.6 mg/dL. Has been 2.0-2.1 since May 2021.     Home BPs: high in morning; 15 minutes after waking it decreases and is "low all day" until about 5 p.m., it goes up, then he takes his medications.  Recent loop recorder implanted. Has been getting IVF weekly to every other week by cardiologist at [facility]. Also recently started Florinef by PCP.     Heart cath on 4/15/21; no significant blockages. Was sent to [facility] and told he needs a pacemaker. Says this is scheduled 5/13. Has been told to " be taking pedialyte; still with diarrhea occasionally but much improved overall.     Significant medical problems include Hypertension for 5 years, DM for 3 yrs, gout, ulcerative colitis with ostomy and hyperlipidemia, prostate cancer     The patient denies taking NSAIDs.  Had IV contrast with cardiologist for MRI on 4/3/2020.      Social history: retired, took care of wheelchair-bound wife, but she passed away in July 2020  Significant family hx includes: no known kidney disease in family.     Last renal US: Sept 2019, reviewed. Simple left renal cyst.    Has had a recent hospitalization with chest pain, DVT, lower extremity edema and worsening heart failure, GRADY on CKD        No chronic NSAID use, no known exposure to lithium, lead.  Denied any issues with urination including dysuria, hematuria, urgency, hesitancy, nocturia, incomplete voiding. Denied chest pain, nausea, vomiting, abd pain, nausea, vomiting, diarrhea, shortness of breath, pedal edema, Orthopnea, PND.  Home Blood pressures are checked/not checked and stay very low at 80/60 mm Hg majority of the times    ROS:  General: negative for chills, or fatigue  ENT: No epistaxis or headaches  Hematological and Lymphatic: No bleeding problems or blood clots.  Endocrine: No skin changes or temperature intolerance  Respiratory: No cough, shortness of breath, or wheezing  Cardiovascular: No chest pain or dyspnea   Gastrointestinal: No abdominal pain, change in bowel habits  Genito-Urinary: No dysuria, trouble voiding, or hematuria  Musculoskeletal: ROS: negative for - joint pain, joint stiffness, joint swelling, muscle pain or muscular weakness  Neurological: No new focal weakness, no numbness  Dermatological: No rash or ulcers.    PAST MEDICAL HISTORY:  Past Medical History:   Diagnosis Date    Anticoagulant long-term use     Basal cell carcinoma     BPH (benign prostatic hyperplasia)     s/p TURP    Carotid stenosis     Chronic kidney disease     Claudication      Coronary artery disease     DDD (degenerative disc disease) 10/21/2013    Diabetes mellitus with renal complications     Disc disease, degenerative, cervical     DVT (deep venous thrombosis)     Encounter for blood transfusion     GERD (gastroesophageal reflux disease)     Gout, chronic     Heart failure, unspecified     History of ulcerative colitis     s/p colectomy and ileostomy    HLD (hyperlipidemia)     HTN (hypertension)     Ileostomy in place 1982    Paroxysmal atrial fibrillation     RBBB     Squamous cell carcinoma 03/08/2018    Left superior helix near insertion    Squamous cell carcinoma 04/12/2018    Left forearm x 5    Syncope 07/06/2024    Ventricular tachycardia        PAST SURGICAL HISTORY:  Past Surgical History:   Procedure Laterality Date    ABLATION, SVT, ACCESSORY PATHWAY N/A 4/30/2024    Procedure: Ablation, SVT, Accessory Pathway;  Surgeon: Franky Taylor MD;  Location: [facility] EP LAB;  Service: Cardiology;  Laterality: N/A;  VT, RFA, Carto, MAC, GP, 3 Prep *MDT ILR in situ*    cardiac stents      CATARACT EXTRACTION Bilateral     CERVICAL FUSION      CHOLECYSTECTOMY N/A 2/14/2023    Procedure: CHOLECYSTECTOMY;  Surgeon: Mike Joyce MD;  Location: [facility] OR;  Service: General;  Laterality: N/A;  very high probabilty of converison to open    colectomy and ileostomy  1985    ENDOSCOPIC ULTRASOUND OF UPPER GASTROINTESTINAL TRACT N/A 2/10/2023    Procedure: ULTRASOUND, UPPER GI TRACT, ENDOSCOPIC;  Surgeon: Poli Fuller MD;  Location: [facility] ENDO;  Service: Endoscopy;  Laterality: N/A;    ERCP N/A 2/10/2023    Procedure: ERCP (ENDOSCOPIC RETROGRADE CHOLANGIOPANCREATOGRAPHY);  Surgeon: Poli Fuller MD;  Location: [facility] ENDO;  Service: Endoscopy;  Laterality: N/A;    EXCISION OF PAROTID GLAND Left 12/18/2020    Procedure: EXCISION, PAROTID GLAND;  Surgeon: Michael Pinzon MD;  Location: [address] FLR;  Service: ENT;  Laterality: Left;    INSERTION OF IMPLANTABLE LOOP RECORDER   06/07/2021    INSERTION OF IMPLANTABLE LOOP RECORDER Left 6/7/2021    Procedure: INSERTION, IMPLANTABLE LOOP RECORDER;  Surgeon: Romario Dao MD;  Location: [facility] CATH LAB;  Service: Cardiology;  Laterality: Left;    LEFT HEART CATHETERIZATION Right 4/15/2021    Procedure: CATHETERIZATION, HEART, LEFT;  Surgeon: Marcio Jones MD;  Location: [facility] CATH LAB;  Service: Cardiology;  Laterality: Right;    LYSIS OF ADHESIONS N/A 11/9/2020    Procedure: LYSIS, ADHESIONS,  ERAS low;  Surgeon: CED Haley MD;  Location: [facility] OR 2ND FLR;  Service: Colon and Rectal;  Laterality: N/A;    LYSIS OF ADHESIONS N/A 2/14/2023    Procedure: LYSIS, ADHESIONS;  Surgeon: Mike Joyce MD;  Location: [facility] OR;  Service: General;  Laterality: N/A;    POUCHOSCOPY N/A 4/6/2022    Procedure: ENDOSCOPY, POUCH, SMALL INTESTINE, DIAGNOSTIC;  Surgeon: Dieter Juarez MD;  Location: [facility] ENDO (2ND FLR);  Service: Endoscopy;  Laterality: N/A;    REPAIR, HERNIA, PARASTOMAL N/A 11/9/2020    Procedure: REPAIR, HERNIA, PARASTOMAL;  Surgeon: CED Haley MD;  Location: [facility] OR 2ND FLR;  Service: Colon and Rectal;  Laterality: N/A;    TRANSURETHRAL RESECTION OF PROSTATE (TURP) WITHOUT USE OF LASER N/A 1/23/2019    Procedure: TURP, WITHOUT USING LASER BIPOLAR;  Surgeon: Catarino Mota MD;  Location: [facility] OR 1ST FLR;  Service: Urology;  Laterality: N/A;  1.5 HOURS    TREATMENT OF CARDIAC ARRHYTHMIA  4/30/2024    Procedure: Cardioversion or Defibrillation;  Surgeon: Franky Taylor MD;  Location: [facility] EP LAB;  Service: Cardiology;;       FAMILY HISTORY:   Family History   Problem Relation Name Age of Onset    Dementia Mother      Cancer Father      Diabetes Father      Heart disease Father      Melanoma Neg Hx      Hypertension Neg Hx      Arthritis Neg Hx         SOCIAL HISTORY:  Social History     Socioeconomic History    Marital status:     Number of children: 1   Occupational History    Occupation: Long haul truck   x 44 years     Comment: Retired    Occupation: [place]    Tobacco Use    Smoking status: Never     Passive exposure: Never    Smokeless tobacco: Never   Substance and Sexual Activity    Alcohol use: No    Drug use: No    Sexual activity: Not Currently     Partners: Female     Social Drivers of Health     Financial Resource Strain: Low Risk  (10/25/2024)    Overall Financial Resource Strain (CARDIA)     Difficulty of Paying Living Expenses: Not hard at all   Food Insecurity: No Food Insecurity (10/25/2024)    Hunger Vital Sign     Worried About Running Out of Food in the Last Year: Never true     Ran Out of Food in the Last Year: Never true   Transportation Needs: No Transportation Needs (10/25/2024)    TRANSPORTATION NEEDS     Transportation : No   Physical Activity: Sufficiently Active (10/3/2024)    Exercise Vital Sign     Days of Exercise per Week: 7 days     Minutes of Exercise per Session: 150+ min   Recent Concern: Physical Activity - Inactive (8/29/2024)    Exercise Vital Sign     Days of Exercise per Week: 0 days     Minutes of Exercise per Session: 0 min   Stress: No Stress Concern Present (10/25/2024)    [employer] - Occupational Stress Questionnaire     Feeling of Stress : Not at all   Recent Concern: Stress - Stress Concern Present (10/8/2024)    [employer] - Occupational Stress Questionnaire     Feeling of Stress : To some extent   Housing Stability: Low Risk  (10/25/2024)    Housing Stability Vital Sign     Unable to Pay for Housing in the Last Year: No     Homeless in the Last Year: No       ALLERGIES:  Review of patient's allergies indicates:   Allergen Reactions    Atorvastatin     Rosuvastatin        MEDICATIONS:    Current Outpatient Medications:     apixaban (ELIQUIS) 5 mg Tab, Take 1 tablet (5 mg total) by mouth 2 (two) times daily., Disp: 60 tablet, Rfl: 2    aspirin (ECOTRIN) 81 MG EC tablet, Take 1 tablet (81 mg total)  by mouth once daily., Disp: 30 tablet, Rfl: 11    brimonidine 0.2% (ALPHAGAN) 0.2 % Drop, Place 1 drop into both eyes 2 (two) times a day., Disp: 60 mL, Rfl: 3    ferrous sulfate (IRON) 325 mg (65 mg iron) Tab tablet, Take 1 tablet (325 mg total) by mouth every other day., Disp: 45 tablet, Rfl: 3    furosemide (LASIX) 40 MG tablet, Take 1 tablet (40 mg total) by mouth 2 (two) times daily., Disp: 60 tablet, Rfl: 2    gabapentin (NEURONTIN) 300 MG capsule, Take 300 mg by mouth 2 (two) times daily., Disp: , Rfl:     glimepiride (AMARYL) 4 MG tablet, TAKE 1 TABLET TWICE DAILY BEFORE MEALS, Disp: 180 tablet, Rfl: 0    HYDROcodone-acetaminophen (NORCO) 5-325 mg per tablet, Take 1 tablet by mouth every 12 (twelve) hours as needed for Pain., Disp: 20 tablet, Rfl: 0    magnesium oxide (MAG-OX) 400 mg (241.3 mg magnesium) tablet, Take 400 mg by mouth once daily., Disp: , Rfl:     metoprolol succinate (TOPROL-XL) 25 MG 24 hr tablet, Take 1 tablet (25 mg total) by mouth once daily., Disp: 90 tablet, Rfl: 3    midodrine (PROAMATINE) 10 MG tablet, Take 1 tablet (10 mg total) by mouth 3 (three) times daily with meals., Disp: 270 tablet, Rfl: 3    pantoprazole (PROTONIX) 40 MG tablet, TAKE 1 TABLET ONE TIME DAILY, Disp: 90 tablet, Rfl: 3    pravastatin (PRAVACHOL) 40 MG tablet, Take 1 tablet (40 mg total) by mouth once daily., Disp: 90 tablet, Rfl: 3    sodium bicarbonate 650 MG tablet, Take 2 tablets (1,300 mg total) by mouth 2 (two) times daily., Disp: 360 tablet, Rfl: 3    tamsulosin (FLOMAX) 0.4 mg Cap, Take 1 capsule (0.4 mg total) by mouth once daily., Disp: 90 capsule, Rfl: 3    ACCU-CHEK PAUL CONTROL SOLN Soln, 1 drop by NOT APPLICABLE route once daily., Disp: , Rfl:     ACCU-CHEK GUIDE TEST STRIPS Strp, TEST BLOOD SUGAR THREE TIMES DAILY, Disp: 300 strip, Rfl: 10    ACCU-CHEK SOFTCLIX LANCETS Misc, TEST BLOOD SUGAR THREE TIMES DAILY, Disp: 300 each, Rfl: 3    allopurinoL (ZYLOPRIM) 100 MG tablet, Take 1 tablet (100 mg  total) by mouth every other day., Disp: 90 tablet, Rfl: 4    blood-glucose meter (ACCU-CHEK GUIDE GLUCOSE METER) [facility], Accucheck BID, Disp: 1 each, Rfl: 0    colchicine (COLCRYS) 0.6 mg tablet, Take 1 tablet (0.6 mg total) by mouth once daily. (Patient not taking: No sig reported), Disp: 30 tablet, Rfl: 1    DROPSAFE ALCOHOL PREP PADS PadM, USE TOPICALLY 5 TIMES DAILY., Disp: 500 each, Rfl: 3    LIDOcaine (LIDODERM) 5 %, Place 1 patch onto the skin once daily. Remove & Discard patch within 12 hours or as directed by MD (Patient not taking: Reported on 11/7/2024), Disp: 30 patch, Rfl: 0    Current Facility-Administered Medications:     leuprolide acetate (6 month) injection 45 mg, 45 mg, Intramuscular, Q6 Months, , 45 mg at 05/14/24 0941    Facility-Administered Medications Ordered in Other Visits:     sodium chloride 0.9% in 1,000 mL infusion, , Intravenous, Continuous, Order, Paper   Medication List with Changes/Refills   Current Medications    ACCU-CHEK PAUL CONTROL SOLN SOLN    1 drop by NOT APPLICABLE route once daily.    ACCU-CHEK GUIDE TEST STRIPS STRP    TEST BLOOD SUGAR THREE TIMES DAILY    ACCU-CHEK SOFTCLIX LANCETS MISC    TEST BLOOD SUGAR THREE TIMES DAILY    APIXABAN (ELIQUIS) 5 MG TAB    Take 1 tablet (5 mg total) by mouth 2 (two) times daily.    ASPIRIN (ECOTRIN) 81 MG EC TABLET    Take 1 tablet (81 mg total) by mouth once daily.    BLOOD-GLUCOSE METER (ACCU-CHEK GUIDE GLUCOSE METER) [facility]    Accucheck BID    BRIMONIDINE 0.2% (ALPHAGAN) 0.2 % DROP    Place 1 drop into both eyes 2 (two) times a day.    COLCHICINE (COLCRYS) 0.6 MG TABLET    Take 1 tablet (0.6 mg total) by mouth once daily.    DROPSAFE ALCOHOL PREP PADS PADM    USE TOPICALLY 5 TIMES DAILY.    FERROUS SULFATE (IRON) 325 MG (65 MG IRON) TAB TABLET    Take 1 tablet (325 mg total) by mouth every other day.    FUROSEMIDE (LASIX) 40 MG TABLET    Take 1 tablet (40 mg total) by mouth 2 (two) times daily.    GABAPENTIN (NEURONTIN) 300 MG CAPSULE    " Take 300 mg by mouth 2 (two) times daily.    GLIMEPIRIDE (AMARYL) 4 MG TABLET    TAKE 1 TABLET TWICE DAILY BEFORE MEALS    HYDROCODONE-ACETAMINOPHEN (NORCO) 5-325 MG PER TABLET    Take 1 tablet by mouth every 12 (twelve) hours as needed for Pain.    LIDOCAINE (LIDODERM) 5 %    Place 1 patch onto the skin once daily. Remove & Discard patch within 12 hours or as directed by MD    MAGNESIUM OXIDE (MAG-OX) 400 MG (241.3 MG MAGNESIUM) TABLET    Take 400 mg by mouth once daily.    METOPROLOL SUCCINATE (TOPROL-XL) 25 MG 24 HR TABLET    Take 1 tablet (25 mg total) by mouth once daily.    MIDODRINE (PROAMATINE) 10 MG TABLET    Take 1 tablet (10 mg total) by mouth 3 (three) times daily with meals.    PANTOPRAZOLE (PROTONIX) 40 MG TABLET    TAKE 1 TABLET ONE TIME DAILY    PRAVASTATIN (PRAVACHOL) 40 MG TABLET    Take 1 tablet (40 mg total) by mouth once daily.    SODIUM BICARBONATE 650 MG TABLET    Take 2 tablets (1,300 mg total) by mouth 2 (two) times daily.    TAMSULOSIN (FLOMAX) 0.4 MG CAP    Take 1 capsule (0.4 mg total) by mouth once daily.   Changed and/or Refilled Medications    Modified Medication Previous Medication    ALLOPURINOL (ZYLOPRIM) 100 MG TABLET allopurinoL (ZYLOPRIM) 100 MG tablet       Take 1 tablet (100 mg total) by mouth every other day.    Take 0.5 tablets (50 mg total) by mouth every other day.        PHYSICAL EXAM:  BP (!) 87/66 (BP Location: Right arm, Patient Position: Sitting)   Pulse 77   Ht 6' 2" (1.88 m)   Wt 77.5 kg (170 lb 13.7 oz)   SpO2 98%   BMI 21.94 kg/m²     General: No distress, No fever or chills  Head: Normocephalic,atraumatic  Eyes: conjunctivae/corneas clear. PERRL, EOM's intact.  Nose: Nares normal. Mucosa normal. No drainage or sinus tenderness.  Neck: No adenopathy,no carotid bruit,no JVD  Lungs:Clear to auscultation bilaterally, No Crackles  Heart: Regular rate and rhythm, no murmur, gallops or rubs  Abdomen: Soft, no tenderness, bowel sounds normal  Extremities: " Atraumatic, no edema in LE  Skin: Turgor normal. No rashes or ulcers  Neurologic: No focal weakness, oriented.  Dialysis Access: Non applicable         LABS:  Lab Results   Component Value Date    HGB 9.3 (L) 10/28/2024        Lab Results   Component Value Date    CREATININE 2.9 (H) 10/31/2024       Prot/Creat Ratio, Urine   Date Value Ref Range Status   06/24/2024 0.20 0.00 - 0.20 Final   10/13/2023 Unable to calculate 0.00 - 0.20 Final   06/02/2023 Unable to calculate 0.00 - 0.20 Final       Lab Results   Component Value Date     10/31/2024    K 5.1 10/31/2024    CO2 17 (L) 10/31/2024       last PTH   Lab Results   Component Value Date    .9 (H) 10/03/2024    CALCIUM 9.0 10/31/2024    PHOS 3.0 10/19/2024       Lab Results   Component Value Date    HGBA1C 6.8 (H) 08/04/2024       Lab Results   Component Value Date    LDLCALC 71.0 08/04/2024         Creatinine, Urine   Date Value Ref Range Status   06/24/2024 65.0 23.0 - 375.0 mg/dL Final   06/24/2024 65.0 23.0 - 375.0 mg/dL Final   10/13/2023 32.0 23.0 - 375.0 mg/dL Final   10/13/2023 32.0 23.0 - 375.0 mg/dL Final       Protein, Urine   Date Value Ref Range Status   05/15/2004 5 0 - 10 mg/dl Final     Protein, Urine Random   Date Value Ref Range Status   06/24/2024 13 0 - 15 mg/dL Final   10/13/2023 <7 0 - 15 mg/dL Final   06/02/2023 <7 0 - 15 mg/dL Final       Prot/Creat Ratio, Urine   Date Value Ref Range Status   06/24/2024 0.20 0.00 - 0.20 Final   10/13/2023 Unable to calculate 0.00 - 0.20 Final   06/02/2023 Unable to calculate 0.00 - 0.20 Final         No images are attached to the encounter.       Problem List   CKD stage 4, eGFR 18 ml/min secondary to multiple GRADY episodes along with HFrEF  HFrEF , EF 35 % and grade 2 diastolic dysfunction   Metabolic acidosis  Secondary hyper parathyroid ism  Hypotension needing Midodrine   Lower extremity edema   Anemia of Iron Def  Gout flares     ASSESSMENT and PLAN    CKD stage 4 c eGFR 18-20 mL/min c  superimposed JELANI-  CKD clinically 2/2 age-related nephron loss,multiple Jelani episodes, chronic hypotension leading to hypoperfusion an recent worsening heart failure with EF 35 % and grade 2 DD, previous NSAID use for gout, volume depletion episodes c high ostomy output. Labile sCr. Progressive, clinically from frequent AKIs 2/2 hypotensive episodes.      No longer using NSAIDs;  At risk for progression given frequent AKIs. On PPI.      Uroscopy 8/1/22: a few granular casts.  Has been hospitalized before and on lasix 40 mg po BID.   Discussed to hold off on metoprolol if bp < 100 systolic and take only one pill of furosemide  Discussed about dialysis modalities and referral made to education class  He has Ileostomy tube in place and with his multiple surgeries, he might not be a good candidate for PD  His BP seems to stay low and would be challenging for HD too  Referral made to transplant  Very high risk of progression to ESRD given persistently low BP and multiple JELANI, volume overload      Diabetes Mellitus   Continue current medications      Hypotension   Pt actually is very hypotensive and is running between 80-90 mmHg systolic   On midodrine      Anemia of iron def  T sat very low at 5  Would schedule Iron infusions with venofer      CKD-Bone mineral abnormalities   Will check PTH   Calcium and Phosphorous at goal     Metabolic Acidosis  Continue sodium bicarb     Hyperlipidemia   Continue statins      HFrEF and diastolic dysfunction   On Lasix 40 mg po BID for now    Asked to hold off with very low BP and if symptomatic with hypotension      Very high risk of progression to ESRD          55 minutes of total time spent on the encounter, which includes face to face time and non-face to face time preparing to see the patient (eg, review of tests), Obtaining and/or reviewing separately obtained history, documenting clinical information in the electronic or other health record, independently interpreting results  (not separately reported) and communicating results to the patient/family/caregiver, or Care coordination (not separately reported).       Diagnosis and plan of care explained to the patient.  Pt Verbalized understanding. Answered all questions.  Thanks for allowing me to participate in the care of this patient.     All the above problems put the patient at high risk of mortality    Visit today included increased complexity associated with the care of the episodic problem CKD addressed and managing the longitudinal care of the patient due to the serious and/or complex managed problem(s) CKD, anemia of chronic disease, secondary hyperparathyroidism     Samra Aguirre MD  Nephrology  [facility].

## 2024-11-07 NOTE — PATIENT INSTRUCTIONS
Pls hold off on taking metoprolol if the bp< 100/80 mmHg and take only furosemide one pill a day if the pressures are still low  and feeling dizzy     If bp between 100-120 mmHg, pls consider taking only half a pill of metoprolol and take furosemide 40 mg pills two times a day

## 2024-11-11 NOTE — TELEPHONE ENCOUNTER
Care Due:                  Date            Visit Type   Department     Provider  --------------------------------------------------------------------------------                                NP -                              PRIMARY      [facility] INTERNAL  Last Visit: 11-      CARE ([facility])   PARTH Mohan                              EP -                              PRIMARY      [facility] INTERNAL  Next Visit: 02-      CARE ([facility])   PARTH Mohan                                                            Last  Test          Frequency    Reason                     Performed    Due Date  --------------------------------------------------------------------------------    HBA1C.......  6 months...  glimepiride..............  08- 02-    [facility] Embedded Care Due Messages. Reference number: [id number].   11/11/2024 4:53:24 PM CST

## 2024-11-11 NOTE — TELEPHONE ENCOUNTER
----- Message from Patricia sent at 11/11/2024 11:26 AM CST -----  Contact: [facility]/ [phone]  Diabetic or Medical Supplies.  What supplies are needed: DROPSAFE ALCOHOL PREP PADS PadM  What is the brand name of the supplies: Na  Is this a refill or new prescription:  New  Who prescribed the supplies:    Pharmacy or company name, phone # and location:  [facility] - [place], [place] - [address]  [address] [address]  Phone: [phone] Fax: [phone]         Would the patient like a call back, or a response through their [facility] portal?:   call back   Comments:  pt is requesting a 90 day supply

## 2024-11-12 ENCOUNTER — OUTPATIENT CASE MANAGEMENT (OUTPATIENT)
Dept: ADMINISTRATIVE | Facility: OTHER | Age: 74
End: 2024-11-12
Payer: MEDICARE

## 2024-11-13 RX ORDER — ISOPROPYL ALCOHOL 70 ML/100ML
1 SWAB TOPICAL DAILY
Qty: 500 EACH | Refills: 3 | Status: SHIPPED | OUTPATIENT
Start: 2024-11-13

## 2024-11-14 ENCOUNTER — TELEPHONE (OUTPATIENT)
Dept: TRANSPLANT | Facility: CLINIC | Age: 74
End: 2024-11-14
Payer: MEDICARE

## 2024-11-15 ENCOUNTER — OFFICE VISIT (OUTPATIENT)
Dept: UROLOGY | Facility: CLINIC | Age: 74
End: 2024-11-15
Payer: MEDICARE

## 2024-11-15 VITALS
DIASTOLIC BLOOD PRESSURE: 78 MMHG | SYSTOLIC BLOOD PRESSURE: 123 MMHG | BODY MASS INDEX: 21.93 KG/M2 | WEIGHT: 170.88 LBS | HEART RATE: 100 BPM | HEIGHT: 74 IN

## 2024-11-15 DIAGNOSIS — C61 PROSTATE CANCER: Primary | ICD-10-CM

## 2024-11-15 DIAGNOSIS — N40.0 BENIGN PROSTATIC HYPERPLASIA, UNSPECIFIED WHETHER LOWER URINARY TRACT SYMPTOMS PRESENT: ICD-10-CM

## 2024-11-15 PROCEDURE — 99999 PR PBB SHADOW E&M-EST. PATIENT-LVL IV: CPT | Mod: PBBFAC,HCNC,, | Performed by: UROLOGY

## 2024-11-15 RX ORDER — TAMSULOSIN HYDROCHLORIDE 0.4 MG/1
0.4 CAPSULE ORAL DAILY
Qty: 90 CAPSULE | Refills: 3 | Status: SHIPPED | OUTPATIENT
Start: 2024-11-15 | End: 2025-11-15

## 2024-11-15 NOTE — PROGRESS NOTES
CC: elevated PSA with suspected prostate cancer    Jarrett Charlton Jr. is a 74 y.o. man who is here for Follow-up (Pt states he is here for a follow up visit for PSA. Last lab was in October 2024.)      Jarrett Charlton Jr. is a 70 y.o. man who is here for the evaluation of elevated PSA.  He had Lupron injection treatment for suspected prostate cancer with rising PSA      a history of elevated PSA, negative TUR biopsy in January. History of APR for crohn's disease.  MRI- 15 ccs. PI-RADS 4, 1 cm lesion, right base PZ. Negative extra prostatic extension,NVB,SV nodes.     Hx of no rectum as well as concerning PIRADS lesion on MRI.   Had TURP for biopsying suspicious area      Date: 01/23/2019  Procedure(s) Performed:   TURP  Findings:   Open bladder neck some regrowth of adenoma and suspicious area in right mid prostate   Right side of prostate resected and sent for pathology  SPECIMEN  1) Right prostate chips.  FINAL PATHOLOGIC DIAGNOSIS  Right prostate gland, transurethral resection:  - Benign prostatic tissue with nodular stromal hyperplasia  - Negative for malignancy     Since TURP, he noted that he can void better with better urine flow.  Blood in urine resolved.  However, he reports Occasional ZEB but it got all better.  No more ZEB reported.  He is here with another MRI of prostate following TURP because of still rising PSA up to 6.3 from 4.1.     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  He was not interested in SUDS.  S/p colon surgery and his anus is closed.  Therefore dong TRUS bx of prostate is not feasible.  After discussion, we decided to try finasteride to see whether it will lower his PSA.   He has been on finasteride for the past couple of years. Following TURP in 1/2019, we decided to stop taking finasteride.     When he was considered to undergo TURP, we could not do his surgery because he was not able to stop  ASA or Plavix due to fresh vascular stent.  He got a clearance that he can hold off plavix and ASA prior to TURP.  Hx of ulcerative colitis, had colon surgery back in 1985 and his anus was closed.  Has a neuropathy on the right leg.  Hx of sciatic nerve on both sides and received injection on the back.  Hx of diabetes diagnosed 2 years ago.  No family hx of prostate cancer.  He is a .  Denies flank pain, dysuira, hematuria .       MRI of prostate 8/20/19  Previous biopsy: Negative on 01/23/2019  PSA: 6.3 ng/mL (08/16/2019)  Prior therapy: TURP  Prostate: 3.6 x 3.5 x 3.0 cm corresponding to a computed volume of 15.2 cc.  Peripheral zone: Focal abnormalities with imaging features concerning for prostate cancer.  Lesion (MARYCARMEN) #1  Location: Side: right; Region: base; Zone: posterior peripheral zone laterally  Greatest dimension: 1.0 cm  T2-WI: T2 SI: circumscribed, homogeneous moderate hypointensity--score 4 or 5  DWI/ADC: DWI: Focal markedly hypointense on ADC and markedly hyperintense on high b-value DWI--score 4 or 5  DCE: Positive  Extraprostatic extension: No gross extraprostatic extension noting postsurgical changes limits evaluation.  PI-RADS assessment category: 4    Transitional zone: Mostly surgically resected.  Neurovascular bundle: Normal.  Seminal vesicles:  SV invasion: None  Adjacent Organ Involvement: No focal bladder wall thickening.  No rectal involvement.  Lymphadenopathy: None     On 11/22/19, pt underwent perineal biopsy by IR for CT guided needle bx for the right posterolateral prostate lesion;  Elevated prostate specific antigen (PSA)  Pathology:  BIOPSY OF PROSTATE BED:  FIBROFATTY TISSUE WITH NO NEOPLASIA IDENTIFIED     Pt had his PSA repeated and is now elevated to 7.9.  He was very anxious about possible prostate cancer and would like to to something about it.  So we have started ADT.  He is here with PSA follow up.  Denies any problems. No hot flashes, decreased energy  level.    Past Medical History:   Diagnosis Date    Anticoagulant long-term use     Basal cell carcinoma     BPH (benign prostatic hyperplasia)     s/p TURP    Carotid stenosis     Chronic kidney disease     Claudication     Coronary artery disease     DDD (degenerative disc disease) 10/21/2013    Diabetes mellitus with renal complications     Disc disease, degenerative, cervical     DVT (deep venous thrombosis)     Encounter for blood transfusion     GERD (gastroesophageal reflux disease)     Gout, chronic     Heart failure, unspecified     History of ulcerative colitis     s/p colectomy and ileostomy    HLD (hyperlipidemia)     HTN (hypertension)     Ileostomy in place 1982    Paroxysmal atrial fibrillation     RBBB     Squamous cell carcinoma 03/08/2018    Left superior helix near insertion    Squamous cell carcinoma 04/12/2018    Left forearm x 5    Syncope 07/06/2024    Ventricular tachycardia      Past Surgical History:   Procedure Laterality Date    ABLATION, SVT, ACCESSORY PATHWAY N/A 4/30/2024    Procedure: Ablation, SVT, Accessory Pathway;  Surgeon: Franky Taylor MD;  Location: [facility] EP LAB;  Service: Cardiology;  Laterality: N/A;  VT, RFA, Carto, MAC, GP, 3 Prep *MDT ILR in situ*    cardiac stents      CATARACT EXTRACTION Bilateral     CERVICAL FUSION      CHOLECYSTECTOMY N/A 2/14/2023    Procedure: CHOLECYSTECTOMY;  Surgeon: Mike Joyce MD;  Location: [facility] OR;  Service: General;  Laterality: N/A;  very high probabilty of converison to open    colectomy and ileostomy  1985    ENDOSCOPIC ULTRASOUND OF UPPER GASTROINTESTINAL TRACT N/A 2/10/2023    Procedure: ULTRASOUND, UPPER GI TRACT, ENDOSCOPIC;  Surgeon: Poli Fuller MD;  Location: [facility] ENDO;  Service: Endoscopy;  Laterality: N/A;    ERCP N/A 2/10/2023    Procedure: ERCP (ENDOSCOPIC RETROGRADE CHOLANGIOPANCREATOGRAPHY);  Surgeon: Poli Fuller MD;  Location: [facility] ENDO;  Service: Endoscopy;  Laterality: N/A;    EXCISION OF PAROTID GLAND Left  12/18/2020    Procedure: EXCISION, PAROTID GLAND;  Surgeon: Michael Pinzon MD;  Location: [facility] OR 2ND FLR;  Service: ENT;  Laterality: Left;    INSERTION OF IMPLANTABLE LOOP RECORDER  06/07/2021    INSERTION OF IMPLANTABLE LOOP RECORDER Left 6/7/2021    Procedure: INSERTION, IMPLANTABLE LOOP RECORDER;  Surgeon: Romario Dao MD;  Location: [facility] CATH LAB;  Service: Cardiology;  Laterality: Left;    LEFT HEART CATHETERIZATION Right 4/15/2021    Procedure: CATHETERIZATION, HEART, LEFT;  Surgeon: Marcio Jones MD;  Location: [facility] CATH LAB;  Service: Cardiology;  Laterality: Right;    LYSIS OF ADHESIONS N/A 11/9/2020    Procedure: LYSIS, ADHESIONS,  ERAS low;  Surgeon: CED Haley MD;  Location: [facility] OR 2ND FLR;  Service: Colon and Rectal;  Laterality: N/A;    LYSIS OF ADHESIONS N/A 2/14/2023    Procedure: LYSIS, ADHESIONS;  Surgeon: Mike Joyce MD;  Location: [facility];  Service: General;  Laterality: N/A;    POUCHOSCOPY N/A 4/6/2022    Procedure: ENDOSCOPY, POUCH, SMALL INTESTINE, DIAGNOSTIC;  Surgeon: Dieter Juarez MD;  Location: [facility] ENDO (2ND FLR);  Service: Endoscopy;  Laterality: N/A;    REPAIR, HERNIA, PARASTOMAL N/A 11/9/2020    Procedure: REPAIR, HERNIA, PARASTOMAL;  Surgeon: CED Haley MD;  Location: [facility] OR 2ND FLR;  Service: Colon and Rectal;  Laterality: N/A;    TRANSURETHRAL RESECTION OF PROSTATE (TURP) WITHOUT USE OF LASER N/A 1/23/2019    Procedure: TURP, WITHOUT USING LASER BIPOLAR;  Surgeon: Catarino Mota MD;  Location: [facility] OR 1ST FLR;  Service: Urology;  Laterality: N/A;  1.5 HOURS    TREATMENT OF CARDIAC ARRHYTHMIA  4/30/2024    Procedure: Cardioversion or Defibrillation;  Surgeon: Franky Taylor MD;  Location: [facility] EP LAB;  Service: Cardiology;;     Social History     Tobacco Use    Smoking status: Never     Passive exposure: Never    Smokeless tobacco: Never   Substance Use Topics    Alcohol use: No    Drug use: No     Family History   Problem Relation  Name Age of Onset    Dementia Mother      Cancer Father      Diabetes Father      Heart disease Father      Melanoma Neg Hx      Hypertension Neg Hx      Arthritis Neg Hx       Allergy:  Review of patient's allergies indicates:   Allergen Reactions    Atorvastatin     Rosuvastatin      Outpatient Encounter Medications as of 11/15/2024   Medication Sig Dispense Refill    ACCU-CHEK PAUL CONTROL SOLN Soln 1 drop by NOT APPLICABLE route once daily.      ACCU-CHEK GUIDE TEST STRIPS Strp TEST BLOOD SUGAR THREE TIMES DAILY 300 strip 10    ACCU-CHEK SOFTCLIX LANCETS Misc TEST BLOOD SUGAR THREE TIMES DAILY 300 each 3    alcohol swabs (DROPSAFE ALCOHOL PREP PADS) PadM Apply 1 each topically once daily. 500 each 3    allopurinoL (ZYLOPRIM) 100 MG tablet Take 1 tablet (100 mg total) by mouth every other day. 90 tablet 4    apixaban (ELIQUIS) 5 mg Tab Take 1 tablet (5 mg total) by mouth 2 (two) times daily. 60 tablet 2    aspirin (ECOTRIN) 81 MG EC tablet Take 1 tablet (81 mg total) by mouth once daily. 30 tablet 11    blood-glucose meter (ACCU-CHEK GUIDE GLUCOSE METER) [facility] [place] [facility] Accucheck BID 1 each 0    brimonidine 0.2% (ALPHAGAN) 0.2 % Drop Place 1 drop into both eyes 2 (two) times a day. 60 mL 3    ferrous sulfate (IRON) 325 mg (65 mg iron) Tab tablet Take 1 tablet (325 mg total) by mouth every other day. 45 tablet 3    furosemide (LASIX) 40 MG tablet Take 1 tablet (40 mg total) by mouth 2 (two) times daily. 60 tablet 2    gabapentin (NEURONTIN) 300 MG capsule Take 300 mg by mouth 2 (two) times daily.      glimepiride (AMARYL) 4 MG tablet TAKE 1 TABLET TWICE DAILY BEFORE MEALS 180 tablet 0    HYDROcodone-acetaminophen (NORCO) 5-325 mg per tablet Take 1 tablet by mouth every 12 (twelve) hours as needed for Pain. 20 tablet 0    magnesium oxide (MAG-OX) 400 mg (241.3 mg magnesium) tablet Take 400 mg by mouth once daily.      metoprolol succinate (TOPROL-XL) 25 MG 24 hr tablet Take 1 tablet (25 mg total) by mouth once daily. 90  tablet 3    midodrine (PROAMATINE) 10 MG tablet Take 1 tablet (10 mg total) by mouth 3 (three) times daily with meals. 270 tablet 3    pantoprazole (PROTONIX) 40 MG tablet TAKE 1 TABLET ONE TIME DAILY 90 tablet 3    pravastatin (PRAVACHOL) 40 MG tablet Take 1 tablet (40 mg total) by mouth once daily. 90 tablet 3    sodium bicarbonate 650 MG tablet Take 2 tablets (1,300 mg total) by mouth 2 (two) times daily. 360 tablet 3    traMADoL (ULTRAM) 50 mg tablet Take 1 tablet (50 mg total) by mouth every 8 (eight) hours as needed for Pain. 12 each 0    [DISCONTINUED] tamsulosin (FLOMAX) 0.4 mg Cap Take 1 capsule (0.4 mg total) by mouth once daily. 90 capsule 3    colchicine (COLCRYS) 0.6 mg tablet Take 1 tablet (0.6 mg total) by mouth once daily. (Patient not taking: Reported on 11/15/2024) 30 tablet 1    LIDOcaine (LIDODERM) 5 % Place 1 patch onto the skin once daily. Remove & Discard patch within 12 hours or as directed by MD (Patient not taking: Reported on 11/15/2024) 30 patch 0    tamsulosin (FLOMAX) 0.4 mg Cap Take 1 capsule (0.4 mg total) by mouth once daily. 90 capsule 3    [DISCONTINUED] allopurinoL (ZYLOPRIM) 100 MG tablet Take 0.5 tablets (50 mg total) by mouth every other day. (Patient not taking: Reported on 11/6/2024) 8 tablet 0    [DISCONTINUED] brimonidine 0.2% (ALPHAGAN) 0.2 % Drop Place 1 drop into both eyes 2 (two) times a day. 10 mL 5    [DISCONTINUED] DROPSAFE ALCOHOL PREP PADS PadM USE TOPICALLY 5 TIMES DAILY. 500 each 3    [DISCONTINUED] ferrous sulfate (IRON) 325 mg (65 mg iron) Tab tablet Take 1 tablet (325 mg total) by mouth every other day. 15 tablet 11    [DISCONTINUED] furosemide (LASIX) 20 MG tablet Take 1 tablet (20 mg total) by mouth as needed (For lower extremity swelling; hold if SBP <100). (Patient not taking: Reported on 10/29/2024) 60 tablet 11    [DISCONTINUED] hydrALAZINE (APRESOLINE) 10 MG tablet Take 1 tablet (10 mg total) by mouth 3 (three) times daily. Take 2 tablets three times  daily as needed for elevated blood pressure 270 tablet 3    [DISCONTINUED] midodrine (PROAMATINE) 5 MG Tab Take 1 tablet (5 mg total) by mouth 3 (three) times daily with meals. 90 tablet 11    [DISCONTINUED] predniSONE (DELTASONE) 10 MG tablet Take 4 tablets (40 mg total) by mouth once daily for 5 days, THEN 3 tablets (30 mg total) once daily for 3 days, THEN 2 tablets (20 mg total) once daily for 3 days, THEN 1 tablet (10 mg total) once daily for 3 days. 38 tablet 0    [DISCONTINUED] pregabalin (LYRICA) 50 MG capsule Take 1 capsule (50 mg total) by mouth 2 (two) times daily. 60 capsule 1     Facility-Administered Encounter Medications as of 11/15/2024   Medication Dose Route Frequency Provider Last Rate Last Admin    leuprolide acetate (6 month) injection 45 mg  45 mg Intramuscular Q6 Months    45 mg at 05/14/24 0941    leuprolide acetate (6 month) injection 45 mg  45 mg Intramuscular Q6 Months         sodium chloride 0.9% in 1,000 mL infusion   Intravenous Continuous Order, Paper        [DISCONTINUED] acetaminophen tablet 650 mg  650 mg Oral Q6H PRN Ashely Cruz MD        [DISCONTINUED] allopurinoL tablet 100 mg  100 mg Oral Daily Ashely Cruz MD   100 mg at 10/28/24 0817    [DISCONTINUED] apixaban tablet 5 mg  5 mg Oral BID Ashely Cruz MD   5 mg at 10/28/24 0817    [DISCONTINUED] aspirin EC tablet 81 mg  81 mg Oral Daily Ashely Cruz MD   81 mg at 10/28/24 0817    [DISCONTINUED] brimonidine (ALPHAGAN P) ophthalmic solution 0.1%  1 drop Both Eyes BID Ashely Cruz MD   1 drop at 10/28/24 0818    [DISCONTINUED] ceFEPIme injection 1 g  1 g Intravenous Q24H Vera Sommers DO   1 g at 10/27/24 1431    [DISCONTINUED] colchicine capsule/tablet 0.6 mg  0.6 mg Oral Daily Ashely Cruz MD        [DISCONTINUED] dextrose 10% bolus 125 mL 125 mL  12.5 g Intravenous PRN Ashely Cruz MD        [DISCONTINUED] dextrose 10% bolus 250 mL 250 mL  25 g Intravenous PRN Anthony,  Ashely DOZIER MD        [DISCONTINUED] ferrous sulfate tablet 1 each  1 tablet Oral Daily Ashely Cruz MD   1 each at 10/28/24 0817    [DISCONTINUED] glucagon (human recombinant) injection 1 mg  1 mg Intramuscular PRN Ashely Cruz MD        [DISCONTINUED] glucose chewable tablet 16 g  16 g Oral PRN Ashely Cruz MD        [DISCONTINUED] glucose chewable tablet 24 g  24 g Oral PRN Ashely Cruz MD        [DISCONTINUED] HYDROcodone-acetaminophen 5-325 mg per tablet 1 tablet  1 tablet Oral Q6H PRN Ashely Cruz MD   1 tablet at 10/27/24 2039    [DISCONTINUED] insulin aspart U-100 pen 0-5 Units  0-5 Units Subcutaneous QID (AC + HS) PRN Ashely Cruz MD        [DISCONTINUED] metoprolol succinate (TOPROL-XL) 24 hr tablet 25 mg  25 mg Oral Daily Ashely Cruz MD   25 mg at 10/28/24 0817    [DISCONTINUED] midodrine tablet 5 mg  5 mg Oral TID WM Ashely Cruz MD   5 mg at 10/28/24 1219    [DISCONTINUED] naloxone 0.4 mg/mL injection 0.02 mg  0.02 mg Intravenous PRN Asheyl Cruz MD        [DISCONTINUED] ondansetron injection 4 mg  4 mg Intravenous Q8H PRN Ashely Cruz MD        [DISCONTINUED] pantoprazole EC tablet 40 mg  40 mg Oral Daily Ashely Cruz MD   40 mg at 10/28/24 0817    [DISCONTINUED] potassium chloride SA CR tablet 20 mEq  20 mEq Oral Daily Ashely Cruz MD   20 mEq at 10/28/24 0817    [DISCONTINUED] pravastatin tablet 40 mg  40 mg Oral QHS Ashely Cruz MD   40 mg at 10/27/24 2039    [DISCONTINUED] pregabalin capsule 50 mg  50 mg Oral BID Ashely Cruz MD   50 mg at 10/28/24 0817    [DISCONTINUED] sodium bicarbonate tablet 1,300 mg  1,300 mg Oral BID Ashely Cruz MD   1,300 mg at 10/28/24 0817    [DISCONTINUED] sodium chloride 0.9% flush 10 mL  10 mL Intravenous Q12H PRN Ashely Cruz MD        [DISCONTINUED] tamsulosin 24 hr capsule 0.4 mg  0.4 mg Oral Daily Ashely Cruz MD   0.4 mg at  10/28/24 0817    [DISCONTINUED] vancomycin - pharmacy to dose   Intravenous pharmacy to manage frequency Vera oSmmers DO         Review of Systems   ROS  Physical Exam     Vitals:    11/15/24 1008   BP: 123/78   Pulse: 100         Physical Exam  Genitalia:  Scrotum: no rash or lesion  Normal symmetric epididymis without masses  Normal vas palpated  Normal size, symmetric testicles with no masses   Normal urethral meatus with no discharge  Normal circumcised penis with no lesion   Rectal:  Normal perineum and anus upon inspection.  Normal tone, no masses or tenderness;     LABS:  Lab Results   Component Value Date    PSA 1.2 10/19/2024    PSA 0.18 11/19/2020    PSA 7.9 (H) 04/20/2020    PSA 2.1 09/15/2014    PSA 2.16 05/13/2013    PSA 1.2 03/19/2012    PSA 1.0 02/19/2010    PSA 1.4 02/17/2009    PSA 1.1 04/30/2007    PSA 1.0 05/12/2006    PSADIAG 1.1 09/01/2024    PSADIAG 2.4 05/09/2024    PSADIAG 1.5 01/10/2024    PSADIAG 0.69 09/08/2023    PSADIAG 0.08 11/22/2022    PSADIAG 0.03 03/04/2022    PSADIAG 0.03 12/16/2021    PSADIAG 0.02 10/29/2021    PSADIAG 0.02 09/14/2021    PSADIAG 0.04 05/25/2021     Results for orders placed or performed in visit on 09/01/24   PROSTATE SPECIFIC ANTIGEN, DIAGNOSTIC    Collection Time: 09/01/24  7:45 AM   Result Value Ref Range    PSA Diagnostic 1.1 0.00 - 4.00 ng/mL   Results for orders placed or performed in visit on 05/09/24   PROSTATE SPECIFIC ANTIGEN, DIAGNOSTIC    Collection Time: 05/09/24  8:54 AM   Result Value Ref Range    PSA Diagnostic 2.4 0.00 - 4.00 ng/mL   Results for orders placed or performed in visit on 01/10/24   PROSTATE SPECIFIC ANTIGEN, DIAGNOSTIC    Collection Time: 01/10/24  8:37 AM   Result Value Ref Range    PSA Diagnostic 1.5 0.00 - 4.00 ng/mL     Lab Results   Component Value Date    CREATININE 2.2 (H) 11/07/2024    CREATININE 2.9 (H) 10/31/2024    CREATININE 2.7 (H) 10/28/2024     Results for orders placed or performed in visit on 10/26/23    Testosterone    Collection Time: 10/26/23 11:15 AM   Result Value Ref Range    Testosterone, Total 29 (L) 304 - 1227 ng/dL     *Note: Due to a large number of results and/or encounters for the requested time period, some results have not been displayed. A complete set of results can be found in Results Review.     Urine Culture, Routine   Date Value Ref Range Status   09/19/2024 (A)  Final    ACINETOBACTER BAUMANNII/HAEMOLYTICUS  >100,000 cfu/ml  No other significant isolate       Hemoglobin A1C   Date Value Ref Range Status   08/04/2024 6.8 (H) 4.0 - 5.6 % Final     Comment:     ADA Screening Guidelines:  5.7-6.4%  Consistent with prediabetes  >or=6.5%  Consistent with diabetes    High levels of fetal hemoglobin interfere with the HbA1C  assay. Heterozygous hemoglobin variants (HbS, HgC, etc)do  not significantly interfere with this assay.   However, presence of multiple variants may affect accuracy.     05/09/2024 7.0 (H) 4.0 - 5.6 % Final     Comment:     ADA Screening Guidelines:  5.7-6.4%  Consistent with prediabetes  >or=6.5%  Consistent with diabetes    High levels of fetal hemoglobin interfere with the HbA1C  assay. Heterozygous hemoglobin variants (HbS, HgC, etc)do  not significantly interfere with this assay.   However, presence of multiple variants may affect accuracy.         Radiology:  MRI of prostate 8/20/19  Previous biopsy: Negative on 01/23/2019  PSA: 6.3 ng/mL (08/16/2019)  Prior therapy: TURP  Prostate: 3.6 x 3.5 x 3.0 cm corresponding to a computed volume of 15.2 cc.  Peripheral zone: Focal abnormalities with imaging features concerning for prostate cancer.  Lesion (MARYCARMEN) #1  Location: Side: right; Region: base; Zone: posterior peripheral zone laterally  Greatest dimension: 1.0 cm  T2-WI: T2 SI: circumscribed, homogeneous moderate hypointensity--score 4 or 5  DWI/ADC: DWI: Focal markedly hypointense on ADC and markedly hyperintense on high b-value DWI--score 4 or 5  DCE:  "Positive  Extraprostatic extension: No gross extraprostatic extension noting postsurgical changes limits evaluation.  PI-RADS assessment category: 4    Transitional zone: Mostly surgically resected.  Neurovascular bundle: Normal.  Seminal vesicles:  SV invasion: None  Adjacent Organ Involvement: No focal bladder wall thickening.  No rectal involvement.  Lymphadenopathy: None  Assessment and Plan:  Jarrett was seen today for follow-up.    Diagnoses and all orders for this visit:    Prostate cancer  -     Prior authorization Order  -     leuprolide acetate (6 month) injection 45 mg  -     Prostate Specific Antigen, Diagnostic; Future    Benign prostatic hyperplasia, unspecified whether lower urinary tract symptoms present  -     tamsulosin (FLOMAX) 0.4 mg Cap; Take 1 capsule (0.4 mg total) by mouth once daily.        hx of "suspected prostate cancer" with abnormal MRI of prostate with rising PSA up to 7.9 in 4/20/20.  Never proved that he has a prostate cancer with tissue diagnosis despite TURP.  Unable to perform prostate biopsy because hs anus is closed off.    his PSA is markedly down since Lupron injection   Has been intermittent therapy.  Last Lupron injection given in 5/2024.     PSA is down to 1.2 from 2.4.  So will plan on continuing Lupron injection.  Today we found that his lupron was not pre-authorized yet.  So will plan to give it on his next visit in 3 months with PSA.    Continue to follow up with serial PSA.  Continue flomax.  OK to stop Citracal due to worsening renal function, per nephrologist.    I spent a total of 20 minutes on the day of the visit.  This includes face to face time and non-face to face time preparing to see the patient (eg, review of tests), obtaining and/or reviewing separately obtained history, documenting clinical information in the electronic or other health record, independently interpreting results and communicating results to the patient/family/caregiver, or care coordinator. "     Follow-up:  Follow up in about 12 weeks (around 2/7/2025).

## 2024-11-19 ENCOUNTER — OFFICE VISIT (OUTPATIENT)
Dept: RHEUMATOLOGY | Facility: CLINIC | Age: 74
End: 2024-11-19
Payer: MEDICARE

## 2024-11-19 VITALS
DIASTOLIC BLOOD PRESSURE: 70 MMHG | SYSTOLIC BLOOD PRESSURE: 151 MMHG | BODY MASS INDEX: 22.13 KG/M2 | WEIGHT: 172.38 LBS | HEART RATE: 62 BPM

## 2024-11-19 DIAGNOSIS — K50.90 CROHN'S DISEASE WITHOUT COMPLICATION, UNSPECIFIED GASTROINTESTINAL TRACT LOCATION: ICD-10-CM

## 2024-11-19 DIAGNOSIS — N18.4 CKD STAGE 4 DUE TO TYPE 2 DIABETES MELLITUS: ICD-10-CM

## 2024-11-19 DIAGNOSIS — M11.20 CALCIUM PYROPHOSPHATE DEPOSITION DISEASE (CPPD): ICD-10-CM

## 2024-11-19 DIAGNOSIS — E11.42 TYPE 2 DIABETES MELLITUS WITH DIABETIC POLYNEUROPATHY, WITHOUT LONG-TERM CURRENT USE OF INSULIN: ICD-10-CM

## 2024-11-19 DIAGNOSIS — M1A.09X0 IDIOPATHIC CHRONIC GOUT OF MULTIPLE SITES WITHOUT TOPHUS: Primary | ICD-10-CM

## 2024-11-19 DIAGNOSIS — M25.519 SHOULDER PAIN, ACUTE: ICD-10-CM

## 2024-11-19 DIAGNOSIS — I50.9 CONGESTIVE HEART FAILURE, UNSPECIFIED HF CHRONICITY, UNSPECIFIED HEART FAILURE TYPE: ICD-10-CM

## 2024-11-19 DIAGNOSIS — M15.9 GENERALIZED OSTEOARTHRITIS: ICD-10-CM

## 2024-11-19 DIAGNOSIS — E11.22 CKD STAGE 4 DUE TO TYPE 2 DIABETES MELLITUS: ICD-10-CM

## 2024-11-19 PROCEDURE — 99999 PR PBB SHADOW E&M-EST. PATIENT-LVL IV: CPT | Mod: PBBFAC,HCNC,, | Performed by: INTERNAL MEDICINE

## 2024-11-19 RX ORDER — ALLOPURINOL 100 MG/1
400 TABLET ORAL DAILY
Qty: 360 TABLET | Refills: 4 | Status: SHIPPED | OUTPATIENT
Start: 2024-11-19

## 2024-11-19 RX ORDER — TRAMADOL HYDROCHLORIDE 50 MG/1
50 TABLET ORAL EVERY 8 HOURS PRN
Qty: 60 EACH | Refills: 0 | Status: SHIPPED | OUTPATIENT
Start: 2024-11-19

## 2024-11-19 RX ORDER — HYDRALAZINE HYDROCHLORIDE 10 MG/1
TABLET, FILM COATED ORAL
COMMUNITY
Start: 2024-11-13

## 2024-11-19 NOTE — PROGRESS NOTES
History of present illness:  74-year-old gentleman who has been followed in the rheumatology department since 2004. He was originally seen by Dr. Darby and then in 2009 he was seen by Dr. Steward.  Dr. Parekh has been following him since 2010. He has chronic gouty arthritis.  He has been on allopurinol 450 mg daily.  He has a history of osteoarthritis, especially of his hands.  He also has ulcerative colitis and has had previous surgical procedures.  He has frequent hospitalizations for dehydration.  He was last seen 2/16/2024.     Today presents for follow up of Arthritis of Right hand 3rd digit, believes to be gout. Once digit begins to swell, he takes prednisone to treat it before it becomes a gouty attack. He is not sure if it is safe to continue taking prednisone. States during his last admission his doctors reduced his allopurinol to 100 mg from 450 mg. Right hand flare up occurs every 3 to 4 days. Additional concerns: Is taking gabapentin for numbness to hands but does not think its helpings. Had a recent fall with injury to right shoulder. Denies n/v/f/c.      Physical examination:  Musculoskeletal:  Cervical spine is unremarkable.    He is decreased range of motion of the shoulders bilaterally.  Elbows and wrists are unremarkable.    He has bony hypertrophy of the PIP in D IP of the right hand.  He has no synovitis.  Hips and knees are unremarkable.    He has tenderness of the left ankle.  He has no soft tissue swelling.  He has tenderness of the dorsum of the left foot.  He has pain on inversion of the left ankle.  He has bilateral bunion deformities.    Radiology: He has evidence of osteoarthritis of the foot.  He has no erosive changes.    Assessment:  1.  Inter critical gout   2. Poly arthritis (gout, pseudogout, OA)    Plans:  1 .Discussed etiology and pathology of pseudo arthritis  2. Educated patient on colchicine use, instructed to take once every other day due to kidney status  3. Educated  patient on allopurinol use, gradual increase to 400 mg qd  5. Discontinue gabapentin since it does not seem to be helping  6.  Return in 6 months      Johnnie Prakash DPM PGY-1  Podiatric Medicine & Surgery  [facility]  [employer]  [phone]    I have personally taken the history and examined the patient and concur with the resident's note as above.  He was last seen 2 years ago.  He has been hospitalized many times for congestive heart failure.  He also has chronic renal insufficiency.  His allopurinol was decreased to 100 mg.  He had previously been on 450 mg.  He now has hyperuricemia.  He has been having recurrent gout attacks in his hands although he also has evidence of chondrocalcinosis.  He has tenderness in both hands and mild soft tissue swelling at the wrists.    He may continue to take prednisone as needed for his flares.  He is to increase his allopurinol to 200 mg daily for 1 week, 300 mg daily for 1 week, then 400 mg daily.  Allopurinol does not cause kidney problems in the dose does not need to be reduced in patients with renal insufficiency.  I will start him back on colchicine but at a low dose because of his renal insufficiency.  I instructed him to take 0.6 mg every other day.  We will repeat his blood work in 6 weeks.  I will see him in follow-up in 6 months.

## 2024-11-19 NOTE — PATIENT INSTRUCTIONS
Increase allopurinol to 2 tabs daily for 1 week, 3 pills daily for 1 week, then 4 pills daily, all at one time    Take colchicine 1 pill every other day    Take prednisone as needed for flare.  Message me for any joint problems

## 2024-11-20 ENCOUNTER — TELEPHONE (OUTPATIENT)
Dept: INTERNAL MEDICINE | Facility: CLINIC | Age: 74
End: 2024-11-20
Payer: MEDICARE

## 2024-11-20 ENCOUNTER — TELEPHONE (OUTPATIENT)
Dept: INFECTIOUS DISEASES | Facility: HOSPITAL | Age: 74
End: 2024-11-20
Payer: MEDICARE

## 2024-11-20 NOTE — TELEPHONE ENCOUNTER
----- Message from Bianka sent at 11/20/2024  4:32 PM CST -----  Contact: Keisha with [employer] [phone]  Second attempt:    .1MEDICALADVICE     Patient is calling for Medical Advice regarding: Keisha called to check status of faxed Rx - will refax to [phone]. Keisha states she also needs clinical notes with diagnosis.    How long has patient had these symptoms: n/a    Pharmacy name and phone#: n/a    Patient wants a call back or [facility]: call back, if necessary    Comments: ID# [id number]    Please advise patient replies from provider may take up to 48 hours.

## 2024-11-20 NOTE — TELEPHONE ENCOUNTER
Called Pt to ask about what Rx prescription he called about and said he sent them in to be refilled

## 2024-11-20 NOTE — TELEPHONE ENCOUNTER
----- Message from Bianka sent at 11/20/2024  4:32 PM CST -----  Contact: Keisha with [employer] [phone]  Second attempt:    .1MEDICALADVICE     Patient is calling for Medical Advice regarding: Keisha called to check status of faxed Rx - will refax to [phone]. Keisha states she also needs clinical notes with diagnosis.    How long has patient had these symptoms: n/a    Pharmacy name and phone#: n/a    Patient wants a call back or [facility]: call back, if necessary    Comments: ID# [id number]    Please advise patient replies from provider may take up to 48 hours.

## 2024-11-21 ENCOUNTER — TELEPHONE (OUTPATIENT)
Dept: INTERNAL MEDICINE | Facility: CLINIC | Age: 74
End: 2024-11-21
Payer: MEDICARE

## 2024-11-21 NOTE — TELEPHONE ENCOUNTER
----- Message from Med Assistant Barrera sent at 11/20/2024  4:49 PM CST -----  Contact: Keisha with [employer] [phone]    ----- Message -----  From: Bianka Resendez  Sent: 11/20/2024   4:37 PM CST  To: Marques Almaguer Staff    Second attempt:    .1MEDICALADVICE     Patient is calling for Medical Advice regarding: Keisha called to check status of faxed Rx - will refax to [phone]. Keisha states she also needs clinical notes with diagnosis.    How long has patient had these symptoms: n/a    Pharmacy name and phone#: n/a    Patient wants a call back or [facility]: call back, if necessary    Comments: ID# [id number]    Please advise patient replies from provider may take up to 48 hours.

## 2024-11-21 NOTE — TELEPHONE ENCOUNTER
Pt is asking for ( sorus imide )  20 mg,   he spelled it, Pt states he needs a 90 day supply as he takes it 2 times daily

## 2024-11-22 DIAGNOSIS — Z93.2 ILEOSTOMY IN PLACE: Primary | ICD-10-CM

## 2024-11-22 DIAGNOSIS — I50.43 ACUTE ON CHRONIC COMBINED SYSTOLIC AND DIASTOLIC HEART FAILURE: ICD-10-CM

## 2024-11-22 RX ORDER — FUROSEMIDE 40 MG/1
40 TABLET ORAL 2 TIMES DAILY
Qty: 60 TABLET | Refills: 2 | Status: SHIPPED | OUTPATIENT
Start: 2024-11-22

## 2024-11-22 NOTE — TELEPHONE ENCOUNTER
----- Message from Maren sent at 11/22/2024 12:34 PM CST -----  [facility]   [facility] is calling again to get clinical notes & script completed to get [facility] Medical supplies  She has called before for this fax   [phone]

## 2024-11-22 NOTE — TELEPHONE ENCOUNTER
Spoke to Pt and he stated he needed order for ileostomy bag ordered from [facility] and Pt also stated he needs a refill for Furosemide (LASIX) 40mg a 90 day supply

## 2024-11-22 NOTE — TELEPHONE ENCOUNTER
No care due was identified.  [facility] Embedded Care Due Messages. Reference number: [id number].   11/22/2024 4:27:31 PM CST

## 2024-11-25 RX ORDER — ISOPROPYL ALCOHOL 70 ML/100ML
SWAB TOPICAL
Qty: 300 EACH | Refills: 0 | OUTPATIENT
Start: 2024-11-25

## 2024-11-25 NOTE — TELEPHONE ENCOUNTER
No care due was identified.  [facility] Embedded Care Due Messages. Reference number: [id number].   11/25/2024 12:24:24 PM CST

## 2024-11-25 NOTE — TELEPHONE ENCOUNTER
----- Message from Madie sent at 11/25/2024 12:27 PM CST -----  Contact: [phone]@patient  Good afternoon patient would like a call back to discuss getting his med apixaban (ELIQUIS) 5 mg Tab refilled. Patient says he needs this med to be for 90 days and the doc is only calling it in for 30 days. Please call patient to advise [phone]

## 2024-11-25 NOTE — TELEPHONE ENCOUNTER
----- Message from Zara sent at 11/25/2024 12:09 PM CST -----  Contact: [phone]  Requesting an RX refill or new RX.    Is this a refill or new RX: refill    RX name and strength   apixaban (ELIQUIS) 5 mg Tab 60 tablet    Is this a 30 day or 90 day RX: 90    Pharmacy name and phone #  [facility] - [facility] [address]    Phone: [phone]  Fax: [phone]          The doctors have asked that we provide their patients with the following 2 reminders -- prescription refills can take up to 72 hours, and a friendly reminder that in the future you can use your [facility] account to request refills: yes

## 2024-11-26 NOTE — TELEPHONE ENCOUNTER
Refill Decision Note   Jarrett Charlton  is requesting a refill authorization.  Brief Assessment and Rationale for Refill:  Quick Discontinue  Route     Medication Therapy Plan:        Comments:     Note composed:7:22 PM 11/25/2024

## 2024-11-26 NOTE — TELEPHONE ENCOUNTER
Refill Routing Note   Medication(s) are not appropriate for processing by [facility] for the following reason(s):        Outside of protocol    [facility] action(s):  Route  Quick Discontinue             Appointments  past 12m or future 3m with PCP    Date Provider   Last Visit   11/6/2024 Tripp Mohan MD   Next Visit   2/25/2025 Tripp Mohan MD   ED visits in past 90 days: 4        Note composed:7:22 PM 11/25/2024

## 2024-11-27 ENCOUNTER — TELEPHONE (OUTPATIENT)
Dept: INFECTIOUS DISEASES | Facility: HOSPITAL | Age: 74
End: 2024-11-27
Payer: MEDICARE

## 2024-11-27 ENCOUNTER — TELEPHONE (OUTPATIENT)
Dept: INTERNAL MEDICINE | Facility: CLINIC | Age: 74
End: 2024-11-27
Payer: MEDICARE

## 2024-11-27 ENCOUNTER — PATIENT MESSAGE (OUTPATIENT)
Dept: INFECTIOUS DISEASES | Facility: HOSPITAL | Age: 74
End: 2024-11-27
Payer: MEDICARE

## 2024-11-27 NOTE — TELEPHONE ENCOUNTER
Spoke with pt and he states he needs his rx for his ileostomy supplies for 3 months at a time due to his insurance he states his insurance allows 3 months worth of supplies instead he gets 30 days and doesn't understand why its taking so long as well as why he has to get the rx for only 30 days at a time. Pt is upset and would like to be called with an update by 11/29

## 2024-11-27 NOTE — TELEPHONE ENCOUNTER
----- Message from Madie sent at 11/27/2024  2:36 PM CST -----  Contact: [phone]@patient  Good afternoon patient would like a call back to discuss getting his med called in to a new pharmacy and getting 90 days on his med. Please call patient  to advise [phone]

## 2024-11-29 ENCOUNTER — EXTERNAL HOME HEALTH (OUTPATIENT)
Dept: HOME HEALTH SERVICES | Facility: HOSPITAL | Age: 74
End: 2024-11-29
Payer: MEDICARE

## 2024-11-29 ENCOUNTER — TELEPHONE (OUTPATIENT)
Dept: INTERNAL MEDICINE | Facility: CLINIC | Age: 74
End: 2024-11-29

## 2024-11-29 NOTE — TELEPHONE ENCOUNTER
----- Message from Jess Silverio sent at 7/3/2024 12:34 PM CDT -----  Contact: Patient, [phone]  Calling because Rx oxyCODONE (ROXICODONE) 5 MG immediate release tablet and Rx colchicine (COLCRYS) 0.6 mg tablet is supposed to got the local pharmacy. Please transfer to        [place] [id number]  [facility] [address] [facility]  [place] [address]  Phone: [phone] Fax: [phone]   Bed: HF  Expected date:   Expected time:   Means of arrival:   Comments:  Elite hematoma

## 2024-12-02 ENCOUNTER — TELEPHONE (OUTPATIENT)
Dept: INTERNAL MEDICINE | Facility: CLINIC | Age: 74
End: 2024-12-02
Payer: MEDICARE

## 2024-12-02 DIAGNOSIS — Z93.2 ILEOSTOMY IN PLACE: ICD-10-CM

## 2024-12-02 DIAGNOSIS — Z76.0 PRESCRIPTION REFILL: ICD-10-CM

## 2024-12-02 DIAGNOSIS — N18.32 TYPE 2 DIABETES MELLITUS WITH STAGE 3B CHRONIC KIDNEY DISEASE, WITHOUT LONG-TERM CURRENT USE OF INSULIN: Primary | ICD-10-CM

## 2024-12-02 DIAGNOSIS — E11.22 TYPE 2 DIABETES MELLITUS WITH STAGE 3B CHRONIC KIDNEY DISEASE, WITHOUT LONG-TERM CURRENT USE OF INSULIN: Primary | ICD-10-CM

## 2024-12-02 NOTE — TELEPHONE ENCOUNTER
Spoke to Pt and asked Pt what he needed and he said he need a 90 day supply for his medicine and he only keeps getting a 30 day supply and he stated he need his ( DME..? )  Supplies Pt also stated he needs Dr to call back at this number [phone] for his supplies ( alcohol wipes needles and more )

## 2024-12-02 NOTE — TELEPHONE ENCOUNTER
----- Message from Sveta sent at 12/2/2024 10:51 AM CST -----  Contact: [phone]  .1MEDICALADVICE     Patient is calling for Medical Advice regarding: Patient is requesting a call from the staff regarding Diabetic supplies.     How long has patient had these symptoms: n/a    Pharmacy name and phone#:    Patient wants a call back or thru [facility]: call back     Comments:    Please advise patient replies from provider may take up to 48 hours.

## 2024-12-02 NOTE — TELEPHONE ENCOUNTER
----- Message from Sveta sent at 12/2/2024 10:51 AM CST -----  Contact: [phone]  .1MEDICALADVICE     Patient is calling for Medical Advice regarding: Patient is requesting a call from the staff regarding Diabetic supplies.     How long has patient had these symptoms: n/a    Pharmacy name and phone#:    Patient wants a call back or [facility]: call back     Comments:    Please advise patient replies from provider may take up to 48 hours.

## 2024-12-02 NOTE — TELEPHONE ENCOUNTER
----- Message from Med Assistant Barrera sent at 12/2/2024 11:22 AM CST -----  Contact: [phone]    ----- Message -----  From: Sveta Son  Sent: 12/2/2024  11:06 AM CST  To: Marques Almaguer Staff    .1MEDICALADVICE     Patient is calling for Medical Advice regarding: Ms. Mark with DME is calling to verify status for a request that was sent over on 11/18/2024.     How long has patient had these symptoms: n/a    Pharmacy name and phone#:    Patient wants a call back or thru [facility]: call back     Fax number [phone]    Comments:    Please advise patient replies from provider may take up to 48 hours.

## 2024-12-03 ENCOUNTER — OUTPATIENT CASE MANAGEMENT (OUTPATIENT)
Dept: ADMINISTRATIVE | Facility: OTHER | Age: 74
End: 2024-12-03
Payer: MEDICARE

## 2024-12-04 DIAGNOSIS — E11.22 TYPE 2 DIABETES MELLITUS WITH STAGE 3 CHRONIC KIDNEY DISEASE, WITHOUT LONG-TERM CURRENT USE OF INSULIN, UNSPECIFIED WHETHER STAGE 3A OR 3B CKD: ICD-10-CM

## 2024-12-04 DIAGNOSIS — N18.30 TYPE 2 DIABETES MELLITUS WITH STAGE 3 CHRONIC KIDNEY DISEASE, WITHOUT LONG-TERM CURRENT USE OF INSULIN, UNSPECIFIED WHETHER STAGE 3A OR 3B CKD: ICD-10-CM

## 2024-12-04 RX ORDER — COLCHICINE 0.6 MG/1
0.6 TABLET ORAL EVERY OTHER DAY
Qty: 15 TABLET | Refills: 6 | Status: SHIPPED | OUTPATIENT
Start: 2024-12-04

## 2024-12-04 RX ORDER — COLCHICINE 0.6 MG/1
0.6 TABLET ORAL DAILY
Qty: 30 TABLET | Refills: 1 | OUTPATIENT
Start: 2024-12-04 | End: 2025-12-04

## 2024-12-04 NOTE — TELEPHONE ENCOUNTER
----- Message from Génesis Esparza sent at 12/4/2024 11:37 AM CST -----  Regarding: Refill  Contact: [phone]  Is this a Refill or New Rx (Script/Out of Refills):  Refill     Name of Caller: Patient     Rx Name: colchicine (COLCRYS) 0.6 mg tablet    Pharmacy Name:   [facility] - [facility] [address] [facility]  [address] [facility] [address]  Phone: [phone] Fax: [phone]    Additional Information: Patient is requesting a 90 day supply of medication. Patient has a flare up in right leg that has been going on for 3 days. He would also like a return call to advise.

## 2024-12-04 NOTE — TELEPHONE ENCOUNTER
No care due was identified.  [facility] Embedded Care Due Messages. Reference number: [id number].   12/04/2024 12:03:26 PM CST

## 2024-12-05 ENCOUNTER — DOCUMENT SCAN (OUTPATIENT)
Dept: HOME HEALTH SERVICES | Facility: HOSPITAL | Age: 74
End: 2024-12-05
Payer: MEDICARE

## 2024-12-05 RX ORDER — GLIMEPIRIDE 4 MG/1
4 TABLET ORAL
Qty: 180 TABLET | Refills: 3 | Status: SHIPPED | OUTPATIENT
Start: 2024-12-05

## 2024-12-05 RX ORDER — ISOPROPYL ALCOHOL 70 ML/100ML
1 SWAB TOPICAL DAILY
Qty: 500 EACH | Refills: 3 | Status: SHIPPED | OUTPATIENT
Start: 2024-12-05

## 2024-12-05 RX ORDER — LANCETS
EACH MISCELLANEOUS
Qty: 300 EACH | Refills: 3 | Status: SHIPPED | OUTPATIENT
Start: 2024-12-05

## 2024-12-05 NOTE — TELEPHONE ENCOUNTER
----- Message from Solange sent at 12/5/2024 10:15 AM CST -----  Contact: [phone]  .1MEDICALADVICE     Patient is calling for Medical Advice regarding: [employer] [facility] calling to get the rx for gauze/ wipes has been signed by provider. [phone] fax number    How long has patient had these symptoms:    Pharmacy name and phone#:    Patient wants a call back or [facility]:    Comments:    Please advise patient replies from provider may take up to 48 hours.

## 2024-12-05 NOTE — TELEPHONE ENCOUNTER
----- Message from Med Assistant Mercedes sent at 12/5/2024 10:33 AM CST -----  Contact: [phone]    ----- Message -----  From: Solange Wilson  Sent: 12/5/2024  10:18 AM CST  To: Marques Almaguer Staff    .1MEDICALADVICE     Patient is calling for Medical Advice regarding: [employer] [facility] calling to get the rx for gauze/ wipes has been signed by provider. [phone] fax number    How long has patient had these symptoms:    Pharmacy name and phone#:    Patient wants a call back or [facility]:    Comments:    Please advise patient replies from provider may take up to 48 hours.

## 2024-12-05 NOTE — TELEPHONE ENCOUNTER
I checked around Dr Mohan's work perimeter and do not see these forms. This request would normally be handled with the MA in the practice.

## 2024-12-06 ENCOUNTER — OFFICE VISIT (OUTPATIENT)
Dept: CARDIOLOGY | Facility: CLINIC | Age: 74
End: 2024-12-06
Payer: MEDICARE

## 2024-12-06 ENCOUNTER — PATIENT MESSAGE (OUTPATIENT)
Dept: CARDIOLOGY | Facility: CLINIC | Age: 74
End: 2024-12-06

## 2024-12-06 VITALS
HEART RATE: 116 BPM | WEIGHT: 168.63 LBS | OXYGEN SATURATION: 99 % | SYSTOLIC BLOOD PRESSURE: 122 MMHG | BODY MASS INDEX: 21.64 KG/M2 | HEIGHT: 74 IN | DIASTOLIC BLOOD PRESSURE: 87 MMHG

## 2024-12-06 DIAGNOSIS — I45.2 BIFASCICULAR BLOCK: ICD-10-CM

## 2024-12-06 DIAGNOSIS — I71.22 ANEURYSM OF AORTIC ARCH WITHOUT RUPTURE: ICD-10-CM

## 2024-12-06 DIAGNOSIS — E78.2 MIXED HYPERLIPIDEMIA: ICD-10-CM

## 2024-12-06 DIAGNOSIS — I70.0 AORTIC ATHEROSCLEROSIS: ICD-10-CM

## 2024-12-06 DIAGNOSIS — I50.43 ACUTE ON CHRONIC COMBINED SYSTOLIC AND DIASTOLIC HEART FAILURE: ICD-10-CM

## 2024-12-06 DIAGNOSIS — E11.42 TYPE 2 DIABETES MELLITUS WITH DIABETIC POLYNEUROPATHY, WITHOUT LONG-TERM CURRENT USE OF INSULIN: ICD-10-CM

## 2024-12-06 DIAGNOSIS — E11.22 CKD STAGE 4 DUE TO TYPE 2 DIABETES MELLITUS: ICD-10-CM

## 2024-12-06 DIAGNOSIS — I10 ESSENTIAL HYPERTENSION: ICD-10-CM

## 2024-12-06 DIAGNOSIS — I27.20 PULMONARY HYPERTENSION: ICD-10-CM

## 2024-12-06 DIAGNOSIS — I48.0 PAROXYSMAL ATRIAL FIBRILLATION: Primary | ICD-10-CM

## 2024-12-06 DIAGNOSIS — N18.4 CKD STAGE 4 DUE TO TYPE 2 DIABETES MELLITUS: ICD-10-CM

## 2024-12-06 DIAGNOSIS — I50.20 HFREF (HEART FAILURE WITH REDUCED EJECTION FRACTION): ICD-10-CM

## 2024-12-06 DIAGNOSIS — I25.10 CORONARY ARTERY DISEASE INVOLVING NATIVE CORONARY ARTERY OF NATIVE HEART WITHOUT ANGINA PECTORIS: ICD-10-CM

## 2024-12-06 DIAGNOSIS — Z86.718 HISTORY OF DVT (DEEP VEIN THROMBOSIS): ICD-10-CM

## 2024-12-06 DIAGNOSIS — I48.0 PAF (PAROXYSMAL ATRIAL FIBRILLATION): Primary | ICD-10-CM

## 2024-12-06 PROBLEM — I48.91 ATRIAL FIBRILLATION WITH RVR: Status: RESOLVED | Noted: 2024-04-30 | Resolved: 2024-12-06

## 2024-12-06 LAB
OHS QRS DURATION: 124 MS
OHS QTC CALCULATION: 520 MS

## 2024-12-06 PROCEDURE — 99999 PR PBB SHADOW E&M-EST. PATIENT-LVL IV: CPT | Mod: PBBFAC,HCNC,, | Performed by: INTERNAL MEDICINE

## 2024-12-06 RX ORDER — METOPROLOL SUCCINATE 25 MG/1
25 TABLET, EXTENDED RELEASE ORAL DAILY
Qty: 90 TABLET | Refills: 3 | Status: SHIPPED | OUTPATIENT
Start: 2024-12-06 | End: 2025-12-06

## 2024-12-06 RX ORDER — AMIODARONE HYDROCHLORIDE 200 MG/1
200 TABLET ORAL 2 TIMES DAILY
Qty: 180 TABLET | Refills: 3 | Status: SHIPPED | OUTPATIENT
Start: 2024-12-06 | End: 2025-12-06

## 2024-12-06 NOTE — ASSESSMENT & PLAN NOTE
Possibly related to his atrial fibrillation condition which has recurred.  His previous ejection fractions were normal.  Other idiopathic etiologies also possible.

## 2024-12-06 NOTE — ASSESSMENT & PLAN NOTE
Pravastatin 40 mg utilized.  Current LDL 71 mg%, HDL 25, triglycerides 130 mg% by labs August 20, 2024.

## 2024-12-06 NOTE — ASSESSMENT & PLAN NOTE
Most recent hemoglobin A1c 6.7.  He has been diabetic for about 5-6 years.  Amaryl currently utilized.

## 2024-12-06 NOTE — ASSESSMENT & PLAN NOTE
Severe by echo, 80s.  Chronic volume overload state related to renal insufficiency and reduced ejection fraction.  Lasix 40 mg b.i.d. to continue.  Functional status [place] [employer] late 2, 3 status.

## 2024-12-06 NOTE — PROGRESS NOTES
[facility] Cardiology     Subjective:    Patient ID:  Jarrett Charlton Jr. is a 74 y.o. male who presents for follow-up of Atrial Fibrillation, Coronary Artery Disease, Chronic Renal Failure, Hyperlipidemia, Hypertension, Diabetes Mellitus, Breathing Problem, and Congestive Heart Failure    Review of patient's allergies indicates:   Allergen Reactions    Atorvastatin     Rosuvastatin      He was hospitalized for 4 nights in October, current EF 35%.  Echo showed pulmonary hypertension PA pressure 80s.  His BNP was nearly 3000 on admission.  The patient had a coronary stent in 2017 to LAD.  A heart catheterization in 2021 confirmed no progression of disease with 20% left anterior descending artery stenosis circumflex 20-30%, RCA normal.  His ejection fraction was normal in 2023.    He has paroxysmal AFib.  He has a loop recorder.  The last interrogation confirmed 43% AFib.  It sounds like he is in AFib today.  He does not feel palpitations.  He does not know medications or diagnoses well.    He had ablation surgery at this facility with Dr. Erickson April 20, 2024.  His initial follow-up via loop recorder was without AFib.  Subsequent interrogations have confirmed AFib.  His loop recorder was implanted in 2021 at an outside facility.    He has advanced renal insufficiency.  He follows with Nephrology.  He is on 40 mg Lasix twice daily.  His GFR status is CKD 4.  He is not on Jardiance.  He takes Pravachol for hyperlipidemia.  For blood pressure he is on hydralazine and low-dose Toprol.  He feels bad when he has hypotension.  He uses midodrine intermittently.  He is on Eliquis for his AFib.  Today's heart rate is tachycardic.    Today's electrocardiogram reviewed and independently interpreted by myself confirms heart rate 120, frequent PACs right bundle branch block with left anterior fascicular block.  Heart rate 120.         Review of Systems    Constitutional: Negative for chills, decreased appetite, diaphoresis, fever, malaise/fatigue, night sweats, weight gain and weight loss.   HENT:  Negative for congestion, ear discharge, ear pain, hearing loss, hoarse voice, nosebleeds, odynophagia, sore throat, stridor and tinnitus.    Eyes:  Negative for blurred vision, discharge, double vision, pain, photophobia, redness, vision loss in left eye, vision loss in right eye, visual disturbance and visual halos.   Cardiovascular:  Positive for dyspnea on exertion. Negative for chest pain, claudication, cyanosis, irregular heartbeat, leg swelling, near-syncope, orthopnea, palpitations, paroxysmal nocturnal dyspnea and syncope.   Respiratory:  Positive for shortness of breath. Negative for cough, hemoptysis, sleep disturbances due to breathing, snoring, sputum production and wheezing.    Endocrine: Negative for cold intolerance, heat intolerance, polydipsia, polyphagia and polyuria.   Hematologic/Lymphatic: Negative for adenopathy and bleeding problem. Does not bruise/bleed easily.   Skin:  Negative for color change, dry skin, flushing, itching, nail changes, poor wound healing, rash, skin cancer, suspicious lesions and unusual hair distribution.   Musculoskeletal:  Negative for arthritis, back pain, falls, gout, joint pain, joint swelling, muscle cramps, muscle weakness, myalgias, neck pain and stiffness.   Gastrointestinal:  Negative for bloating, abdominal pain, anorexia, change in bowel habit, bowel incontinence, constipation, diarrhea, dysphagia, excessive appetite, flatus, heartburn, hematemesis, hematochezia, hemorrhoids, jaundice, melena, nausea and vomiting.   Genitourinary:  Negative for bladder incontinence, decreased libido, dysuria, flank pain, frequency, genital sores, hematuria, hesitancy, incomplete emptying, nocturia and urgency.   Neurological:  Negative for aphonia, brief paralysis, difficulty with concentration, disturbances in coordination,  "excessive daytime sleepiness, dizziness, focal weakness, headaches, light-headedness, loss of balance, numbness, paresthesias, seizures, sensory change, tremors, vertigo and weakness.   Psychiatric/Behavioral:  Positive for memory loss. Negative for altered mental status, depression, hallucinations, substance abuse, suicidal ideas and thoughts of violence. The patient does not have insomnia and is not nervous/anxious.    Allergic/Immunologic: Negative for hives and persistent infections.        Objective:       Vitals:    12/06/24 0936   BP: 122/87   Pulse: (!) 116   SpO2: 99%   Weight: 76.5 kg (168 lb 10.4 oz)   Height: 6' 2" (1.88 m)    Physical Exam  Constitutional:       General: He is not in acute distress.     Appearance: He is well-developed. He is not diaphoretic.   HENT:      Head: Normocephalic and atraumatic.      Nose: Nose normal.   Eyes:      General: No scleral icterus.        Right eye: No discharge.      Conjunctiva/sclera: Conjunctivae normal.      Pupils: Pupils are equal, round, and reactive to light.   Neck:      Thyroid: No thyromegaly.      Vascular: No JVD.      Trachea: No tracheal deviation.   Cardiovascular:      Rate and Rhythm: Tachycardia present. Rhythm irregularly irregular.      Pulses:           Carotid pulses are 2+ on the right side and 2+ on the left side.       Radial pulses are 2+ on the right side and 2+ on the left side.      Heart sounds: Normal heart sounds. No murmur heard.     No friction rub. No gallop.   Pulmonary:      Effort: Pulmonary effort is normal. No respiratory distress.      Breath sounds: Normal breath sounds. No stridor. No wheezing or rales.   Chest:      Chest wall: No tenderness.   Abdominal:      General: Bowel sounds are normal. There is no distension.      Palpations: Abdomen is soft. There is no mass.      Tenderness: There is no abdominal tenderness. There is no guarding or rebound.   Musculoskeletal:         General: No tenderness. Normal range of " motion.      Cervical back: Normal range of motion and neck supple.   Lymphadenopathy:      Cervical: No cervical adenopathy.   Skin:     General: Skin is warm and dry.      Coloration: Skin is not pale.      Findings: No erythema or rash.   Neurological:      Mental Status: He is alert and oriented to person, place, and time.      Cranial Nerves: No cranial nerve deficit.      Coordination: Coordination normal.   Psychiatric:         Behavior: Behavior normal.         Thought Content: Thought content normal.         Judgment: Judgment normal.           Assessment:       1. Paroxysmal atrial fibrillation    2. HFrEF (heart failure with reduced ejection fraction)    3. Pulmonary hypertension    4. Acute on chronic combined systolic and diastolic heart failure    5. Aortic atherosclerosis    6. Aneurysm of aortic arch without rupture    7. Bifascicular block    8. Coronary artery disease involving native coronary artery of native heart without angina pectoris    9. Essential hypertension    10. Mixed hyperlipidemia    11. CKD stage 4 due to type 2 diabetes mellitus    12. History of DVT (deep vein thrombosis)    13. Type 2 diabetes mellitus with diabetic polyneuropathy, without long-term current use of insulin      Results for orders placed or performed in visit on 11/07/24   Renal Function Panel    Collection Time: 11/07/24 11:44 AM   Result Value Ref Range    Glucose 61 (L) 70 - 110 mg/dL    Sodium 143 136 - 145 mmol/L    Potassium 3.9 3.5 - 5.1 mmol/L    Chloride 107 95 - 110 mmol/L    CO2 24 23 - 29 mmol/L    BUN 61 (H) 8 - 23 mg/dL    Calcium 8.4 (L) 8.7 - 10.5 mg/dL    Creatinine 2.2 (H) 0.5 - 1.4 mg/dL    Albumin 2.9 (L) 3.5 - 5.2 g/dL    Phosphorus 3.9 2.7 - 4.5 mg/dL    eGFR 30.5 (A) >60 mL/min/1.73 m^2    Anion Gap 12 8 - 16 mmol/L     *Note: Due to a large number of results and/or encounters for the requested time period, some results have not been displayed. A complete set of results can be found in  Results Review.         Current Outpatient Medications:     ACCU-CHEK PAUL CONTROL SOLN Soln, 1 drop by NOT APPLICABLE route once daily., Disp: , Rfl:     ACCU-CHEK SOFTCLIX LANCETS Misc, TEST BLOOD SUGAR THREE TIMES DAILY, Disp: 300 each, Rfl: 3    alcohol swabs (DROPSAFE ALCOHOL PREP PADS) PadM, Apply 1 each topically once daily., Disp: 500 each, Rfl: 3    allopurinoL (ZYLOPRIM) 100 MG tablet, Take 4 tablets (400 mg total) by mouth once daily., Disp: 360 tablet, Rfl: 4    apixaban (ELIQUIS) 5 mg Tab, Take 1 tablet (5 mg total) by mouth 2 (two) times daily., Disp: 60 tablet, Rfl: 2    blood sugar diagnostic (ACCU-CHEK GUIDE TEST STRIPS) Strp, 1 each by Other route 3 (three) times daily. Test Blood Sugar, Disp: 300 strip, Rfl: 10    blood-glucose meter (ACCU-CHEK GUIDE GLUCOSE METER) Misc, Accucheck BID, Disp: 1 each, Rfl: 0    brimonidine 0.2% (ALPHAGAN) 0.2 % Drop, Place 1 drop into both eyes 2 (two) times a day., Disp: 60 mL, Rfl: 3    colchicine (COLCRYS) 0.6 mg tablet, Take 1 tablet (0.6 mg total) by mouth every other day., Disp: 15 tablet, Rfl: 6    ferrous sulfate (IRON) 325 mg (65 mg iron) Tab tablet, Take 1 tablet (325 mg total) by mouth every other day., Disp: 45 tablet, Rfl: 3    furosemide (LASIX) 40 MG tablet, Take 1 tablet (40 mg total) by mouth 2 (two) times daily., Disp: 60 tablet, Rfl: 2    glimepiride (AMARYL) 4 MG tablet, TAKE 1 TABLET TWICE DAILY BEFORE MEALS, Disp: 180 tablet, Rfl: 3    hydrALAZINE (APRESOLINE) 10 MG tablet, Take by mouth., Disp: , Rfl:     HYDROcodone-acetaminophen (NORCO) 5-325 mg per tablet, Take 1 tablet by mouth every 12 (twelve) hours as needed for Pain., Disp: 20 tablet, Rfl: 0    LIDOcaine (LIDODERM) 5 %, Place 1 patch onto the skin once daily. Remove & Discard patch within 12 hours or as directed by MD, Disp: 30 patch, Rfl: 0    magnesium oxide (MAG-OX) 400 mg (241.3 mg magnesium) tablet, Take 400 mg by mouth once daily., Disp: , Rfl:     midodrine (PROAMATINE) 10 MG  tablet, Take 1 tablet (10 mg total) by mouth 3 (three) times daily with meals., Disp: 270 tablet, Rfl: 3    pantoprazole (PROTONIX) 40 MG tablet, TAKE 1 TABLET ONE TIME DAILY, Disp: 90 tablet, Rfl: 3    pravastatin (PRAVACHOL) 40 MG tablet, Take 1 tablet (40 mg total) by mouth once daily., Disp: 90 tablet, Rfl: 3    sodium bicarbonate 650 MG tablet, Take 2 tablets (1,300 mg total) by mouth 2 (two) times daily., Disp: 360 tablet, Rfl: 3    tamsulosin (FLOMAX) 0.4 mg Cap, Take 1 capsule (0.4 mg total) by mouth once daily., Disp: 90 capsule, Rfl: 3    traMADoL (ULTRAM) 50 mg tablet, Take 1 tablet (50 mg total) by mouth every 8 (eight) hours as needed for Pain., Disp: 60 each, Rfl: 0    amiodarone (PACERONE) 200 MG Tab, Take 1 tablet (200 mg total) by mouth 2 (two) times daily., Disp: 180 tablet, Rfl: 3    aspirin (ECOTRIN) 81 MG EC tablet, Take 1 tablet (81 mg total) by mouth once daily., Disp: 30 tablet, Rfl: 11    metoprolol succinate (TOPROL-XL) 25 MG 24 hr tablet, Take 1 tablet (25 mg total) by mouth once daily., Disp: 90 tablet, Rfl: 3    Current Facility-Administered Medications:     leuprolide acetate (6 month) injection 45 mg, 45 mg, Intramuscular, Q6 Months, , 45 mg at 05/14/24 0941    leuprolide acetate (6 month) injection 45 mg, 45 mg, Intramuscular, Q6 Months,     Facility-Administered Medications Ordered in Other Visits:     sodium chloride 0.9% in 1,000 mL infusion, , Intravenous, Continuous, Order, Paper     Lab Results   Component Value Date    WBC 4.89 10/28/2024    RBC 3.57 (L) 10/28/2024    HGB 9.3 (L) 10/28/2024    HCT 31.1 (L) 10/28/2024    MCV 87 10/28/2024    MCH 26.1 (L) 10/28/2024    MCHC 29.9 (L) 10/28/2024    RDW 21.0 (H) 10/28/2024    PLT 98 (L) 10/28/2024    MPV SEE COMMENT 10/28/2024    GRAN 3.3 10/28/2024    GRAN 68.0 10/28/2024    LYMPH 1.2 10/28/2024    LYMPH 23.7 10/28/2024    MONO 0.3 10/28/2024    MONO 6.3 10/28/2024    EOS 0.1 10/28/2024    BASO 0.02 10/28/2024    EOSINOPHIL 1.2  10/28/2024    BASOPHIL 0.4 10/28/2024    MG 1.7 10/24/2024        CMP  Lab Results   Component Value Date     11/07/2024    K 3.9 11/07/2024     11/07/2024    CO2 24 11/07/2024    GLU 61 (L) 11/07/2024    BUN 61 (H) 11/07/2024    CREATININE 2.2 (H) 11/07/2024    CALCIUM 8.4 (L) 11/07/2024    PROT 6.4 10/31/2024    ALBUMIN 2.9 (L) 11/07/2024    BILITOT 1.4 (H) 10/31/2024    ALKPHOS 139 10/31/2024    AST 18 10/31/2024    ALT 22 10/31/2024    ANIONGAP 12 11/07/2024    ESTGFRAFRICA 18.9 (A) 07/27/2022    EGFRNONAA 16.3 (A) 07/27/2022        Lab Results   Component Value Date    LABBLOO No growth after 5 days. 10/26/2024    LABBLOO No growth after 5 days. 10/26/2024    LABURIN (A) 09/19/2024     ACINETOBACTER BAUMANNII/HAEMOLYTICUS  >100,000 cfu/ml  No other significant isolate              Results for orders placed or performed during the hospital encounter of 10/24/24   EKG 12-lead    Collection Time: 10/24/24  4:24 PM   Result Value Ref Range    QRS Duration 110 ms    OHS QTC Calculation 499 ms    Narrative    Test Reason : R06.02,    Vent. Rate : 106 BPM     Atrial Rate : 106 BPM     P-R Int : 168 ms          QRS Dur : 110 ms      QT Int : 376 ms       P-R-T Axes : 083 -50 -33 degrees     QTc Int : 499 ms    Sinus tachycardia with Premature atrial complexes  Left axis deviation  Incomplete right bundle branch block  Inferior infarct ,age undetermined  Anterior infarct (cited on or before 04-AUG-2024)  Abnormal ECG  When compared with ECG of 08-OCT-2024 16:26,  Incomplete right bundle branch block has replaced (RBBB and left anterior  fascicular block)  Inferior infarct is now Present  Questionable change in initial forces of Anterior-septal leads  Confirmed by Jennifer THAPA, Blake MAHAN ([id number]) on 10/25/2024 12:49:02 PM    Referred By: [email or web address]   SELF           Confirmed By:Blake Rodriguez MD                   Plan:       Problem List Items Addressed This Visit          Cardiac/Vascular    Essential hypertension      He uses midodrine.  He has blood pressure is below 100 at times.  Nephrology wants to avoid hypotension.  Blood pressures reviewed from home.  Most of them are above 100 systolic.  He takes furosemide 40 mg 2 times daily.  He is on Toprol 25 mg hydralazine 10 mg.         HLD (hyperlipidemia)     Pravastatin 40 mg utilized.  Current LDL 71 mg%, HDL 25, triglycerides 130 mg% by labs August 20, 2024.         CAD (coronary artery disease)     History of LAD stent 2017 without myocardial infarction at that time.  Most recent heart catheterization 2021 showing 20% left anterior descending artery disease, 20-30% circumflex disease and normal right coronary artery anatomy.    He has dyspnea on exertion but no anginal complaints.         Aortic atherosclerosis     Condition stable.         Aortic aneurysm     Measurement of ascending aorta 4.6 cm.         Bifascicular block     Noted on Electrocardiogram.         Pulmonary hypertension     Severe by echo, 80s.  Chronic volume overload state related to renal insufficiency and reduced ejection fraction.  Lasix 40 mg b.i.d. to continue.  Functional status [place] [employer] late 2, 3 status.         HFrEF (heart failure with reduced ejection fraction)    Relevant Medications    metoprolol succinate (TOPROL-XL) 25 MG 24 hr tablet    Paroxysmal atrial fibrillation - Primary     Previous ablation April 20, 2024.  He has had recurrence.  Amiodarone initiated today.  Management discussed with Franky Taylor today.    He remains on Eliquis 5 mg b.i.d..    He has a previous loop recorder implanted 2021.         Relevant Orders    IN OFFICE EKG 12-LEAD (to Muse)    Acute on chronic combined systolic and diastolic heart failure     Possibly related to his atrial fibrillation condition which has recurred.  His previous ejection fractions were normal.  Other idiopathic etiologies also possible.            Renal/    CKD stage 4 due to type 2 diabetes mellitus     He follows with  Nephrology.  Continue diuretics.  He has persistent volume overload.            Hematology    History of DVT (deep vein thrombosis)     Left iliac vein DVT August 20, 2024.  He is on Eliquis.            Endocrine    Type 2 diabetes mellitus with diabetic polyneuropathy, without long-term current use of insulin     Most recent hemoglobin A1c 6.7.  He has been diabetic for about 5-6 years.  Amaryl currently utilized.               AFib seems to be triggering LV systolic dysfunction for my review of things.  However today's Electrocardiogram supports sinus tach with frequent PACs, MAT type rhythm on Electrocardiogram.  I will work with electrophysiology.      I advised a 2 month follow-up visit.    Initiating amiodarone at this point is probably going to be beneficial but I would like to get clearance from Dr. Taylor.    Made a 2 month follow-up.  I did not make med changes.             Zafar Rojas MD  12/06/2024   10:26 AM

## 2024-12-06 NOTE — ASSESSMENT & PLAN NOTE
History of LAD stent 2017 without myocardial infarction at that time.  Most recent heart catheterization 2021 showing 20% left anterior descending artery disease, 20-30% circumflex disease and normal right coronary artery anatomy.    He has dyspnea on exertion but no anginal complaints.

## 2024-12-06 NOTE — ASSESSMENT & PLAN NOTE
Previous ablation April 20, 2024.  He has had recurrence.  Amiodarone initiated today.  Management discussed with Franky Taylor today.    He remains on Eliquis 5 mg b.i.d..    He has a previous loop recorder implanted 2021.

## 2024-12-06 NOTE — ASSESSMENT & PLAN NOTE
He uses midodrine.  He has blood pressure is below 100 at times.  Nephrology wants to avoid hypotension.  Blood pressures reviewed from home.  Most of them are above 100 systolic.  He takes furosemide 40 mg 2 times daily.  He is on Toprol 25 mg hydralazine 10 mg.

## 2024-12-10 DIAGNOSIS — I50.20 HFREF (HEART FAILURE WITH REDUCED EJECTION FRACTION): ICD-10-CM

## 2024-12-10 DIAGNOSIS — I50.43 ACUTE ON CHRONIC COMBINED SYSTOLIC AND DIASTOLIC HEART FAILURE: ICD-10-CM

## 2024-12-10 DIAGNOSIS — D50.9 IRON DEFICIENCY ANEMIA, UNSPECIFIED IRON DEFICIENCY ANEMIA TYPE: ICD-10-CM

## 2024-12-10 NOTE — TELEPHONE ENCOUNTER
----- Message from Nyla sent at 12/9/2024 12:51 PM CST -----  Contact: [phone] Patient  Requesting an RX refill or new RX.    Is this a refill or new RX: new/pt states he has been trying to get this medication filled for 2 months and no one every calls him back     RX name and strength (copy/paste from chart):  apixaban (ELIQUIS) 5 mg Tab    Is this a 30 day or 90 day RX:     Pharmacy name and phone # (copy/paste from chart):    [facility] - [place], [place] - [address]  [address] [address]  Phone: [phone] Fax: [phone]    The doctors have asked that we provide their patients with the following 2 reminders -- prescription refills can take up to 72 hours, and a friendly reminder that in the future you can use your [facility] account to request refills: yes

## 2024-12-10 NOTE — TELEPHONE ENCOUNTER
I've re-entered the ileostomy supply prescription numerous times verbatim with what was provided to me by the patient and his proxies. I'm not sure what else I can do from my end.

## 2024-12-10 NOTE — TELEPHONE ENCOUNTER
Eliquis sent to [facility] in [place]. Pt's proxy requested rx sent to local pharmacy rather than [facility]

## 2024-12-10 NOTE — TELEPHONE ENCOUNTER
Patient states everything was sent to [facility] but I'm seeing [facility] on our end. Patient states sherif told him the medications was sent to [facility] and that's what's going on. Patient is very upset he's not sure what happened.  Pt states he wants the provider to reach out to him regarding this matter.

## 2024-12-10 NOTE — TELEPHONE ENCOUNTER
Please assist in clarification of what specifically is needed. I've received so many requests on this matter I'm having trouble tracking what's been addressed and what is still an issue.    I've received requests to send things to [facility] but then to [facility] but then to [facility] again.     I'm trying to solve the issue but I'm receiving conflicting information and requests for things that have already been addressed.    Regarding the ileostomy supplies, again, I sent it with specific instructions to dispense 90 days' worth of supplies. I do not have control over whether the DME company or pharmacy will dispense those as requested.

## 2024-12-10 NOTE — TELEPHONE ENCOUNTER
Patient  states he's been waiting for refill for medications and Ileostomy supplies  for a month  patient states he running out of supplies.

## 2024-12-10 NOTE — TELEPHONE ENCOUNTER
Refill Routing Note   Medication(s) are not appropriate for processing by [facility] for the following reason(s):        Outside of protocol    [facility] action(s):  Route               Appointments  past 12m or future 3m with PCP    Date Provider   Last Visit   11/6/2024 Tripp Mohan MD   Next Visit   12/10/2024 Tripp Mohan MD   ED visits in past 90 days: 3        Note composed:3:14 PM 12/10/2024

## 2024-12-11 DIAGNOSIS — I50.20 HFREF (HEART FAILURE WITH REDUCED EJECTION FRACTION): ICD-10-CM

## 2024-12-11 RX ORDER — FERROUS SULFATE 325(65) MG
325 TABLET ORAL EVERY OTHER DAY
Qty: 45 TABLET | Refills: 3 | Status: SHIPPED | OUTPATIENT
Start: 2024-12-11 | End: 2025-12-11

## 2024-12-11 RX ORDER — METOPROLOL SUCCINATE 25 MG/1
25 TABLET, EXTENDED RELEASE ORAL DAILY
Qty: 90 TABLET | Refills: 3 | Status: SHIPPED | OUTPATIENT
Start: 2024-12-11 | End: 2025-12-11

## 2024-12-11 RX ORDER — METOPROLOL SUCCINATE 25 MG/1
25 TABLET, EXTENDED RELEASE ORAL DAILY
Qty: 90 TABLET | Refills: 3 | OUTPATIENT
Start: 2024-12-11 | End: 2025-12-11

## 2024-12-11 RX ORDER — COLCHICINE 0.6 MG/1
0.6 TABLET ORAL EVERY OTHER DAY
Qty: 15 TABLET | Refills: 6 | OUTPATIENT
Start: 2024-12-11

## 2024-12-11 RX ORDER — FUROSEMIDE 40 MG/1
40 TABLET ORAL 2 TIMES DAILY
Qty: 60 TABLET | Refills: 2 | Status: SHIPPED | OUTPATIENT
Start: 2024-12-11

## 2024-12-11 NOTE — TELEPHONE ENCOUNTER
I called [employer]  [phone] which is where he has previously received his ileostomy supplies. They will be sending over a form to be completed and faxed back to them. They will also require a note that describes the location of his ileostomy. This order was originally ordered by Dr Chaney.  I confirmed with [employer] that they do not supply ileostomy supplies.    Awaiting fax.

## 2024-12-11 NOTE — TELEPHONE ENCOUNTER
[facility] and forms were completed and faxed back along with not for the original prescriber of these supplies. Forms placed in a file by Dr Mohan.

## 2024-12-11 NOTE — TELEPHONE ENCOUNTER
No care due was identified.  [facility] Embedded Care Due Messages. Reference number: [id number].   12/11/2024 10:56:53 AM CST

## 2024-12-13 DIAGNOSIS — I82.412 ACUTE DEEP VEIN THROMBOSIS (DVT) OF FEMORAL VEIN OF LEFT LOWER EXTREMITY: Primary | ICD-10-CM

## 2024-12-13 NOTE — TELEPHONE ENCOUNTER
----- Message from Doreen sent at 12/13/2024  4:02 PM CST -----  Contact: [phone]  Returning a phone call.    Who left a message for the patient:  Divine Roy RN    Do they know what this is regarding:  yes    Would they like a phone call back or a response via [employer]:  call

## 2024-12-13 NOTE — TELEPHONE ENCOUNTER
Pt transferred to me by phone staff. States that his daughter asked for his Eliquis to go to local pharmacy but it has to go to [facility] or his insurance will not pay. Pt has been out of his Eliquis for over a week. Pended to [facility] per your review   LOV with Tripp Mohan MD , 11/6/2024

## 2024-12-17 ENCOUNTER — TELEPHONE (OUTPATIENT)
Dept: ELECTROPHYSIOLOGY | Facility: CLINIC | Age: 74
End: 2024-12-17
Payer: MEDICARE

## 2024-12-17 NOTE — TELEPHONE ENCOUNTER
----- Message from Med Assistant Glenna sent at 12/9/2024  4:18 PM CST -----    ----- Message -----  From: Wanda Rodriguez RN  Sent: 12/9/2024   8:07 AM CST  To: Claudia Wilkins Staff    Patient needs an office visit in the next couple of weeks. He lives in [place] so he may be willing to go to the [place] office? Please let me know if there are any issues. Thanks!  ----- Message -----  From: Franky Taylor MD  Sent: 12/6/2024  11:20 AM CST  To: Wanda Rodriguez RN; Zafar Rojas MD    I think it is reasonable to start amiodarone 200 mg bid x4 weeks then 200 mg daily after that. I have ccd my nurse we can get him in clinic to see me in the next couple weeks.    GP  ----- Message -----  From: Zafar Rojas MD  Sent: 12/6/2024  10:48 AM CST  To: Franky Taylor MD    Sorry to bug you, he had ablation April 20, 2024 with you.  He is back in AFib with reduced ejection fraction.    His heart rate today is 120s, if you can still me through his management.  I would like to initiate amiodarone until you can see him and make recommendations if you think that is proper.    I think his reduced EF currently is related to AFib.

## 2024-12-19 PROBLEM — G90.9 AUTONOMIC DYSFUNCTION: Status: ACTIVE | Noted: 2024-12-19

## 2024-12-19 PROBLEM — I48.0 PAF (PAROXYSMAL ATRIAL FIBRILLATION): Status: ACTIVE | Noted: 2024-12-19

## 2024-12-20 ENCOUNTER — TELEPHONE (OUTPATIENT)
Dept: INTERNAL MEDICINE | Facility: CLINIC | Age: 74
End: 2024-12-20
Payer: MEDICARE

## 2024-12-20 DIAGNOSIS — R62.7 FAILURE TO THRIVE IN ADULT: Primary | ICD-10-CM

## 2024-12-21 NOTE — TELEPHONE ENCOUNTER
----- Message from Med Assistant Barrera sent at 12/3/2024 11:16 AM CST -----  Regarding: FW: GI symptoms needing a GI referral  Contact: Piedad Love RN  Please review for Referral  ----- Message -----  From: Piedad Love RN  Sent: 12/3/2024  10:02 AM CST  To: [facility] Staff  Subject: GI symptoms needing a GI referral                Alicia,    This is Piedad in outpatient case management. Jarrett Charlton, [id number], is complaining of loss of appetite, feelings of being bloated and nauseated after eating, and weight loss. He stated he has not been eating much at all, mostly just drinking boost protein drinks. He stated he is 165 pounds and stated his usual weight is 210 pounds. Last weight documented in Epic on 11/19/2024 is 172 pounds. He is requesting a referral to GI.     Thank you,    SOBEIDA Gibbons, RN  [facility]  [email or web address]  TEL:  [phone]

## 2024-12-26 ENCOUNTER — PATIENT OUTREACH (OUTPATIENT)
Dept: ADMINISTRATIVE | Facility: CLINIC | Age: 74
End: 2024-12-26
Payer: MEDICARE

## 2024-12-26 NOTE — PROGRESS NOTES
C3 nurse attempted to contact Jarrett Charlton Jr. for a TCC post hospital discharge follow up call. The patient is unable to conduct the call @ this time and requested a callback tomorrow at 1500.    The patient has a scheduled HOSFU with Simeon Pedro II, MD (Primary Care) on 12/27/24 at 0920. No messages routed at this time.

## 2024-12-27 ENCOUNTER — LAB VISIT (OUTPATIENT)
Dept: LAB | Facility: HOSPITAL | Age: 74
End: 2024-12-27
Attending: INTERNAL MEDICINE
Payer: MEDICARE

## 2024-12-27 ENCOUNTER — OFFICE VISIT (OUTPATIENT)
Dept: INTERNAL MEDICINE | Facility: CLINIC | Age: 74
End: 2024-12-27
Payer: MEDICARE

## 2024-12-27 ENCOUNTER — PATIENT MESSAGE (OUTPATIENT)
Dept: ADMINISTRATIVE | Facility: CLINIC | Age: 74
End: 2024-12-27
Payer: MEDICARE

## 2024-12-27 VITALS
HEIGHT: 74 IN | DIASTOLIC BLOOD PRESSURE: 70 MMHG | WEIGHT: 166 LBS | SYSTOLIC BLOOD PRESSURE: 106 MMHG | BODY MASS INDEX: 21.3 KG/M2

## 2024-12-27 DIAGNOSIS — N18.32 TYPE 2 DIABETES MELLITUS WITH STAGE 3B CHRONIC KIDNEY DISEASE, WITHOUT LONG-TERM CURRENT USE OF INSULIN: ICD-10-CM

## 2024-12-27 DIAGNOSIS — G89.29 CHRONIC LEFT SHOULDER PAIN: ICD-10-CM

## 2024-12-27 DIAGNOSIS — I50.43 ACUTE ON CHRONIC COMBINED SYSTOLIC AND DIASTOLIC HEART FAILURE: Primary | ICD-10-CM

## 2024-12-27 DIAGNOSIS — E11.22 TYPE 2 DIABETES MELLITUS WITH STAGE 3B CHRONIC KIDNEY DISEASE, WITHOUT LONG-TERM CURRENT USE OF INSULIN: ICD-10-CM

## 2024-12-27 DIAGNOSIS — E11.22 CKD STAGE 4 DUE TO TYPE 2 DIABETES MELLITUS: ICD-10-CM

## 2024-12-27 DIAGNOSIS — I50.43 ACUTE ON CHRONIC COMBINED SYSTOLIC AND DIASTOLIC HEART FAILURE: ICD-10-CM

## 2024-12-27 DIAGNOSIS — D64.9 ANEMIA, UNSPECIFIED TYPE: ICD-10-CM

## 2024-12-27 DIAGNOSIS — I10 ESSENTIAL HYPERTENSION: ICD-10-CM

## 2024-12-27 DIAGNOSIS — R63.4 WEIGHT LOSS: ICD-10-CM

## 2024-12-27 DIAGNOSIS — Z87.19 HISTORY OF ULCERATIVE COLITIS: ICD-10-CM

## 2024-12-27 DIAGNOSIS — Z93.3 COLOSTOMY PRESENT: ICD-10-CM

## 2024-12-27 DIAGNOSIS — C61 PROSTATE CANCER: ICD-10-CM

## 2024-12-27 DIAGNOSIS — N18.4 CKD STAGE 4 DUE TO TYPE 2 DIABETES MELLITUS: ICD-10-CM

## 2024-12-27 DIAGNOSIS — M25.512 CHRONIC LEFT SHOULDER PAIN: ICD-10-CM

## 2024-12-27 DIAGNOSIS — R05.3 CHRONIC COUGH: ICD-10-CM

## 2024-12-27 PROBLEM — N18.30 CHRONIC KIDNEY DISEASE, STAGE 3 UNSPECIFIED: Status: RESOLVED | Noted: 2024-08-14 | Resolved: 2024-12-27

## 2024-12-27 LAB
ALBUMIN SERPL BCP-MCNC: 2.9 G/DL (ref 3.5–5.2)
ALP SERPL-CCNC: 177 U/L (ref 40–150)
ALT SERPL W/O P-5'-P-CCNC: 23 U/L (ref 10–44)
ANION GAP SERPL CALC-SCNC: 12 MMOL/L (ref 8–16)
AST SERPL-CCNC: 20 U/L (ref 10–40)
BASOPHILS # BLD AUTO: 0.04 K/UL (ref 0–0.2)
BASOPHILS NFR BLD: 0.6 % (ref 0–1.9)
BILIRUB SERPL-MCNC: 1.4 MG/DL (ref 0.1–1)
BNP SERPL-MCNC: 2459 PG/ML (ref 0–99)
BUN SERPL-MCNC: 55 MG/DL (ref 8–23)
CALCIUM SERPL-MCNC: 9.1 MG/DL (ref 8.7–10.5)
CHLORIDE SERPL-SCNC: 104 MMOL/L (ref 95–110)
CO2 SERPL-SCNC: 22 MMOL/L (ref 23–29)
CREAT SERPL-MCNC: 2.5 MG/DL (ref 0.5–1.4)
DIFFERENTIAL METHOD BLD: ABNORMAL
EOSINOPHIL # BLD AUTO: 0.1 K/UL (ref 0–0.5)
EOSINOPHIL NFR BLD: 1.5 % (ref 0–8)
ERYTHROCYTE [DISTWIDTH] IN BLOOD BY AUTOMATED COUNT: 21.3 % (ref 11.5–14.5)
EST. GFR  (NO RACE VARIABLE): 26.1 ML/MIN/1.73 M^2
ESTIMATED AVG GLUCOSE: 140 MG/DL (ref 68–131)
GLUCOSE SERPL-MCNC: 185 MG/DL (ref 70–110)
HBA1C MFR BLD: 6.5 % (ref 4–5.6)
HCT VFR BLD AUTO: 37.6 % (ref 40–54)
HGB BLD-MCNC: 11.1 G/DL (ref 14–18)
IMM GRANULOCYTES # BLD AUTO: 0.02 K/UL (ref 0–0.04)
IMM GRANULOCYTES NFR BLD AUTO: 0.3 % (ref 0–0.5)
LYMPHOCYTES # BLD AUTO: 0.7 K/UL (ref 1–4.8)
LYMPHOCYTES NFR BLD: 10.5 % (ref 18–48)
MCH RBC QN AUTO: 26.6 PG (ref 27–31)
MCHC RBC AUTO-ENTMCNC: 29.5 G/DL (ref 32–36)
MCV RBC AUTO: 90 FL (ref 82–98)
MONOCYTES # BLD AUTO: 0.5 K/UL (ref 0.3–1)
MONOCYTES NFR BLD: 7.2 % (ref 4–15)
NEUTROPHILS # BLD AUTO: 5.4 K/UL (ref 1.8–7.7)
NEUTROPHILS NFR BLD: 79.9 % (ref 38–73)
NRBC BLD-RTO: 0 /100 WBC
PLATELET # BLD AUTO: 191 K/UL (ref 150–450)
PMV BLD AUTO: 11.7 FL (ref 9.2–12.9)
POTASSIUM SERPL-SCNC: 5.9 MMOL/L (ref 3.5–5.1)
PROT SERPL-MCNC: 6.5 G/DL (ref 6–8.4)
RBC # BLD AUTO: 4.18 M/UL (ref 4.6–6.2)
SODIUM SERPL-SCNC: 138 MMOL/L (ref 136–145)
WBC # BLD AUTO: 6.76 K/UL (ref 3.9–12.7)

## 2024-12-27 PROCEDURE — 83036 HEMOGLOBIN GLYCOSYLATED A1C: CPT | Mod: HCNC | Performed by: INTERNAL MEDICINE

## 2024-12-27 PROCEDURE — 83880 ASSAY OF NATRIURETIC PEPTIDE: CPT | Mod: HCNC | Performed by: INTERNAL MEDICINE

## 2024-12-27 PROCEDURE — 85025 COMPLETE CBC W/AUTO DIFF WBC: CPT | Mod: HCNC | Performed by: INTERNAL MEDICINE

## 2024-12-27 PROCEDURE — 36415 COLL VENOUS BLD VENIPUNCTURE: CPT | Mod: HCNC | Performed by: INTERNAL MEDICINE

## 2024-12-27 PROCEDURE — 80053 COMPREHEN METABOLIC PANEL: CPT | Mod: HCNC | Performed by: INTERNAL MEDICINE

## 2024-12-27 PROCEDURE — 99999 PR PBB SHADOW E&M-EST. PATIENT-LVL V: CPT | Mod: PBBFAC,HCNC,, | Performed by: INTERNAL MEDICINE

## 2024-12-27 RX ORDER — BENZONATATE 200 MG/1
200 CAPSULE ORAL 2 TIMES DAILY PRN
Qty: 20 CAPSULE | Refills: 0 | Status: SHIPPED | OUTPATIENT
Start: 2024-12-27 | End: 2025-01-06

## 2024-12-27 NOTE — PROGRESS NOTES
Subjective:       Patient ID: Jarrett Charlton Jr. is a 74 y.o. male.    Chief Complaint:   Follow-up (HOSFU )    HPI - this is a complex patient of Dr. Mohan's who is here for hospital follow-up visit.  He was admitted December 19-20 for a CHF exacerbation.  He does not feel much better in terms of dyspnea, but he notes no more lower extremity edema.  He says he is huffing and puffing off and on, particularly with walking; it's hard to figure out, but I think this is longstanding with him.  He has a chronic dry cough that has been bothering him since admission.  40 lb of weight loss since August 2024 noted on chart review.  He sees Urology ifor prostate cancer treatment.  He is due for another injection.  He has Cardiology follow-up in a week.  Lab review showed that his chronic kidney disease is slightly worse.  He has an ileostomy from a history of ulcerative colitis with total colectomy many years ago.    His biggest complaint today is right shoulder pain.  He tripped and fell 2 months ago; presented to the emergency department where imaging did not show a fracture.  He was given a sling, but he is not wearing that.  He rates pain as 10/10.  He does not have good range of motion to forward flexion.  He is not taking any medication for pain relief.    Pmh/meds:  extensive; Reviewed and reconciled in EPIC with patient during visit today.    Review of Systems   Constitutional:  Positive for unexpected weight change. Negative for fever.   HENT:  Negative for congestion.    Respiratory:  Positive for shortness of breath.    Cardiovascular:  Negative for leg swelling.   Gastrointestinal:  Negative for abdominal pain.   Genitourinary:  Negative for difficulty urinating.   Musculoskeletal:  Positive for arthralgias.   Skin:  Negative for rash.   Neurological:  Negative for headaches.   Psychiatric/Behavioral:  Positive for dysphoric mood. Negative for sleep disturbance.        Objective:      Physical  Exam  Constitutional:       General: He is not in acute distress.     Appearance: He is well-developed. He is not diaphoretic.      Comments: Dysthymic man in no distress   HENT:      Head: Normocephalic and atraumatic.   Cardiovascular:      Rate and Rhythm: Normal rate and regular rhythm.      Heart sounds: Normal heart sounds. No murmur heard.     No friction rub. No gallop.   Pulmonary:      Effort: No respiratory distress.      Breath sounds: No wheezing or rales.   Chest:      Chest wall: No tenderness.   Musculoskeletal:      Right lower leg: No edema.      Left lower leg: No edema.      Comments: Right shoulder with crepitus to range of motion.  Limited range of motion to forward flexion.   Skin:     General: Skin is warm.      Findings: No erythema.   Neurological:      Mental Status: He is alert and oriented to person, place, and time.   Psychiatric:         Thought Content: Thought content normal.         Assessment:       1. Acute on chronic combined systolic and diastolic heart failure    2. Chronic left shoulder pain    3. Prostate cancer    4. Anemia, unspecified type    5. CKD stage 4 due to type 2 diabetes mellitus    6. Colostomy present    7. Essential hypertension    8. Type 2 diabetes mellitus with stage 3b chronic kidney disease, without long-term current use of insulin    9. History of ulcerative colitis    10. Weight loss    11. Chronic cough        Plan:       Jarrett was seen today for follow-up.    Diagnoses and all orders for this visit:    Acute on chronic combined systolic and diastolic heart failure - this seems to be better, though he continues to have dyspnea.  He has less lower extremity edema.  I will do a BNP to see where he stands.  -     B-TYPE NATRIURETIC PEPTIDE; Future    Chronic left shoulder pain - this is his biggest issue today.  X-rays did not show any fracture, but he does have crepitus and arthritis.  I will ask ortho to evaluate and consider injection.  -      Ambulatory referral/consult to Orthopedics; Future    Prostate cancer - PSA is rising; unstable.  He has a an appointment with Urology pending    Anemia, unspecified type - this seemed worse during his admission.  Repeat labs today  -     CBC Auto Differential; Future    CKD stage 4 due to type 2 diabetes mellitus - stable.  Labs today  -     COMPREHENSIVE METABOLIC PANEL; Future    Colostomy present    Essential hypertension - at goal, continue present care  -     COMPREHENSIVE METABOLIC PANEL; Future    Type 2 diabetes mellitus with stage 3b chronic kidney disease, without long-term current use of insulin - has been at goal.  Labs today  -     Hemoglobin A1C; Future    History of ulcerative colitis    Weight loss - unclear etiology.  Defer to primary.    Chronic cough - this is multifactorial.  We will provide a cough suppressant.  -     benzonatate (TESSALON) 200 MG capsule; Take 1 capsule (200 mg total) by mouth 2 (two) times daily as needed for Cough.    RTC p.r.nLuna Pedro MD MPH  Staff Internist

## 2024-12-31 ENCOUNTER — INFUSION (OUTPATIENT)
Dept: INFECTIOUS DISEASES | Facility: HOSPITAL | Age: 74
End: 2024-12-31
Payer: MEDICARE

## 2024-12-31 VITALS
SYSTOLIC BLOOD PRESSURE: 106 MMHG | WEIGHT: 164.69 LBS | HEART RATE: 100 BPM | BODY MASS INDEX: 21.14 KG/M2 | TEMPERATURE: 98 F | OXYGEN SATURATION: 99 % | HEIGHT: 74 IN | DIASTOLIC BLOOD PRESSURE: 67 MMHG | RESPIRATION RATE: 19 BRPM

## 2024-12-31 DIAGNOSIS — Z87.19 STATUS POST REPAIR OF VENTRAL HERNIA: ICD-10-CM

## 2024-12-31 DIAGNOSIS — Z98.890 STATUS POST REPAIR OF VENTRAL HERNIA: ICD-10-CM

## 2024-12-31 DIAGNOSIS — E87.20 METABOLIC ACIDOSIS WITH NORMAL ANION GAP AND BICARBONATE LOSSES: Primary | ICD-10-CM

## 2024-12-31 PROCEDURE — 96374 THER/PROPH/DIAG INJ IV PUSH: CPT | Mod: HCNC

## 2024-12-31 PROCEDURE — 63600175 PHARM REV CODE 636 W HCPCS: Mod: HCNC | Performed by: INTERNAL MEDICINE

## 2024-12-31 PROCEDURE — 25000003 PHARM REV CODE 250: Mod: HCNC | Performed by: INTERNAL MEDICINE

## 2024-12-31 RX ORDER — HEPARIN 100 UNIT/ML
500 SYRINGE INTRAVENOUS
Status: DISCONTINUED | OUTPATIENT
Start: 2024-12-31 | End: 2024-12-31 | Stop reason: HOSPADM

## 2024-12-31 RX ORDER — DIPHENHYDRAMINE HYDROCHLORIDE 50 MG/ML
50 INJECTION INTRAMUSCULAR; INTRAVENOUS ONCE AS NEEDED
OUTPATIENT
Start: 2025-01-07

## 2024-12-31 RX ORDER — SODIUM CHLORIDE 0.9 % (FLUSH) 0.9 %
10 SYRINGE (ML) INJECTION
Status: DISCONTINUED | OUTPATIENT
Start: 2024-12-31 | End: 2024-12-31 | Stop reason: HOSPADM

## 2024-12-31 RX ORDER — SODIUM CHLORIDE 0.9 % (FLUSH) 0.9 %
10 SYRINGE (ML) INJECTION
OUTPATIENT
Start: 2025-01-07

## 2024-12-31 RX ORDER — HEPARIN 100 UNIT/ML
500 SYRINGE INTRAVENOUS
OUTPATIENT
Start: 2025-01-07

## 2024-12-31 RX ORDER — EPINEPHRINE 0.3 MG/.3ML
0.3 INJECTION SUBCUTANEOUS ONCE AS NEEDED
OUTPATIENT
Start: 2025-01-07

## 2024-12-31 RX ADMIN — IRON SUCROSE 100 MG: 20 INJECTION, SOLUTION INTRAVENOUS at 09:12

## 2024-12-31 RX ADMIN — SODIUM CHLORIDE: 9 INJECTION, SOLUTION INTRAVENOUS at 09:12

## 2024-12-31 NOTE — PROGRESS NOTES
Patient received 1/10 venofer 100 mg IVPB mixed with  ml and infused over 15 minutes. Patient observed for 60 minutes post infusion.Limited head-to-toe assessment due to privacy issues and visit reason though the opportunity was given for patient to express any concerns.    Patient arrives for infusion of venofer as ordered by Dr. Isabel. All benefits, risks, requirements, and alternatives should have been discussed with patient by ordering provider at the time the order was placed.

## 2025-01-02 NOTE — PROGRESS NOTES
Mr. Charlton is a patient of Dr. Taylor and was last seen in clinic on 9/30/2024 by Dr. Taylor.    Subjective:   Patient ID:  Jarrett Charlton Jr. is a 74 y.o. male who presents for follow up of atrial tachycardia  and Atrial Fibrillation    HPI:    Mr. Charlton is a 74 y.o. male with SBO, s/p colostomy, GERD, parotid mass s/p resection, BPH & prostate CA s/p TURP, CKD 4, pleural plaque, orthostatic hypotension, hypertension, hyperlipidemia, DM2, CAD status post PCI to mid LAD in 2017 (LHC in 2021 showed patent stent, otherwise nonobstructive CAD), who reportedly had VT, atrial tachycardia s/p AT ablation in 4/2024, atrial fibrillation.     Background:    He is a 74M with SBO, s/p colostomy, GERD, parotid mass s/p resection, BPH & prostate CA s/p TURP, CKD 4, pleural plaque, orthostatic hypotension, hypertension, hyperlipidemia, DM2, CAD status post PCI to mid LAD in 2017 (LHC in 2021 showed patent stent, otherwise nonobstructive CAD), who reportedly had VT seen on Holter monitor in 4/2021 and was started on amiodarone around this time. An ILR was implanted in 5/2021, presumably for VT management purposes. Pt reportedly had several episodes of NSVT captured since device implant (longest 36 beats), as well as episodes of AF and SVT.      Echo in 12/2022 showed EF 60%.     At 3/2023 visit pt was on amiodarone 200 mg daily and toprol xl 25 mg daily, in addition to midodrine. Not on DOAC. Device check that day showed stored episodes of AF were in fact most consistent with artifact. Amiodarone stopped at this point.     9/22/2023: [place] ILR alert received for nsVT > 10 beats.  Tachy episode lasting 15 secs, ecg c/w nsVT on 9/03/2023 at 1050 am.      11/7/2023: Atrial fibrillation noted during device remote check: 52 minutes on 10/26/23.  PVCs noted.  No further AF noted past 10/26/23. Pt asymptomatic.      1/31/2024: Report indicated several episodes of probable nsAT/SVT, maximum duration 18 mins 32 secs on 1/28/24; abrupt  onset/offsets noted. Was seen in the hospital on 12/7/2023 for LOC at which time metoprolol was discontinued. Attempt to restart it was unsuccessful (hypotension).      At 3/2024 visit pt stated he has episodes of extreme SOB, LH, fatigue. BPs have been very labile. He is on midodrine. Just took one before this visit today (BP is now very high). Says if he doesn't take it he could become so hypotensive he will faint. Has had multiple syncopal episodes. No palps, CP.     On 4/30/2024 pt underwent EPS. An AT was induced, mapped to 6 o'clock on the tricuspid valve annulus, and successfully ablated.     Pt has had several ED visits/hospitalizations since his ablation, for dehydration, acute neck pain, and hand swelling. ECGs have shown sinus with frequent PACs. Device check in 6/2024 showed an episode of nonsustained AT -340 ms, lasting 6 seconds. Otherwise no events.     At 7/2024 visit pt stated his dizzy spells had subsided since ablation.     Pt presented to [facility] in 8/2024 with swelling and body aches, and found to be in atrial flutter vs coarse AF. US revealed DVT in left external iliac vein, common femoral vein, and greater saphenous vein. VQ scan with low probability of PE. Discharged in sinus rhythm on eliquis.     Dr. Taylor reviewed an 8/2024 device check which showed a 23 hour episode of AF on 8/11/2024. Pt feeling dizzy, which he attributes to his antihypertensives which includes lasix 40 mg daily. He did have leg and hand swelling but these have improved since starting eliquis.     ECG on 9/30/2024 showed NSR at 69 bpm with PACs and RBBB, 107 bpm.    In summary from 9/30/2024 visit, Jarrett Charlton Jr. is a 74M with a reported history of VT, AF, and AT. Intolerant to beta blockers. Now 5 months status-post AT ablation--recent admission to [facility] with AFL vs AF in the setting of DVT. Now on eliquis. Plan is to stop lasix as pt is becoming hypotensive after taking his AM meds (leg and hand swelling  improved), continue eliquis for LE DVT and AF, follow-up 6 months.    12/6/2024: Patient was noted to be in AF with RVR when seeing his cardiologist. Dr. Taylor was made aware and amiodarone 200mg BID x 4 weeks then 200mg daily was initiated.     12/19-20/2024: Admitted to [facility] for CHF exacerbation. He improved with IV diuresis and was discharged home.     Update (01/07/2025):    Today he tells me he feels fatigue. He tells me he has only been on amiodarone for 2 weeks. He tells me he cannot always take his BB due to low normal BP. His swelling to his LE has improved and he is now on Lasix daily. He feels palpitations and SOB sometimes.     ILR interrogation reviewed with Dr. Taylor. Patient with episodes of AF and AT. ILR EOS on 10/4/2024.    I have personally reviewed the patient's EKG today, which shows sinus tachycardia with PACs at 110 bpm. PA interval is 176. QRS is 140 ms. QTc is 538ms.    Relevant Cardiac Test Results:    2D Echo:  Echo Saline Bubble? No    Result Date: 8/5/2024    Left Ventricle: The left ventricle is normal in size. Moderately   increased wall thickness. There is concentric hypertrophy. Moderate global   hypokinesis present. There is moderately reduced systolic function.   Ejection fraction by visual approximation is 35%. Grade II diastolic   dysfunction. Elevated left ventricular filling pressure. Tissue Doppler   velocity is reduced.    Right Ventricle: Normal right ventricular cavity size. Wall thickness   is normal. Systolic function is normal.    Left Atrium: Left atrium is severely dilated.    Right Atrium: Right atrium is moderately dilated.    Aortic Valve: There is mild aortic valve sclerosis. Mildly restricted   motion.    Mitral Valve: There is moderate regurgitation with a centrally directed   jet.    Tricuspid Valve: There is mild to moderate regurgitation with a   centrally directed jet.    Pulmonary Artery: There is severe pulmonary hypertension. The estimated   pulmonary  artery systolic pressure is 79 mmHg.    IVC/SVC: Intermediate venous pressure at 8 mmHg.    Pericardium: There is a small posterior effusion.        Echo    Result Date: 7/8/2024    Left Ventricle: The left ventricle is normal in size. Mildly to   moderately increased wall thickness. Unable to assess wall motion. There   is normal systolic function with a visually estimated ejection fraction of   60 - 65%. Grade II diastolic dysfunction.    Left Atrium: Left atrium is mildly dilated.    Aortic Valve: There is trace aortic regurgitation.    Mitral Valve:  There is mild to moderate regurgitation.    Right Ventricle: Normal right ventricular cavity size. Systolic   function is normal.    Tricuspid Valve: There is mild regurgitation.    The estimated pulmonary artery systolic pressure is 64 mmHg.    IVC/SVC: Elevated venous pressure at 15 mmHg.         Current Outpatient Medications   Medication Sig    ACCU-CHEK PAUL CONTROL SOLN Soln 1 drop by NOT APPLICABLE route once daily.    ACCU-CHEK SOFTCLIX LANCETS [facility] TEST BLOOD SUGAR THREE TIMES DAILY    alcohol swabs (DROPSAFE ALCOHOL PREP PADS) PadM Apply 1 each topically once daily.    allopurinoL (ZYLOPRIM) 100 MG tablet Take 4 tablets (400 mg total) by mouth once daily.    amiodarone (PACERONE) 200 MG Tab Take 1 tablet (200 mg total) by mouth 2 (two) times daily.    apixaban (ELIQUIS) 5 mg Tab Take 1 tablet (5 mg total) by mouth 2 (two) times daily.    aspirin (ECOTRIN) 81 MG EC tablet Take 1 tablet (81 mg total) by mouth once daily.    blood sugar diagnostic (ACCU-CHEK GUIDE TEST STRIPS) Strp 1 each by Other route 3 (three) times daily. Test Blood Sugar    blood-glucose meter (ACCU-CHEK GUIDE GLUCOSE METER) [facility] Accucheck BID    brimonidine 0.2% (ALPHAGAN) 0.2 % Drop Place 1 drop into both eyes 2 (two) times a day.    colchicine (COLCRYS) 0.6 mg tablet Take 1 tablet (0.6 mg total) by mouth every other day.    ferrous sulfate (IRON) 325 mg (65 mg iron) Tab tablet Take 1  tablet (325 mg total) by mouth every other day.    furosemide (LASIX) 40 MG tablet Take 1 tablet (40 mg total) by mouth 2 (two) times daily.    glimepiride (AMARYL) 4 MG tablet TAKE 1 TABLET TWICE DAILY BEFORE MEALS    hydrALAZINE (APRESOLINE) 10 MG tablet Take by mouth.    magnesium oxide (MAG-OX) 400 mg (241.3 mg magnesium) tablet Take 400 mg by mouth once daily.    metoprolol succinate (TOPROL-XL) 25 MG 24 hr tablet Take 1 tablet (25 mg total) by mouth once daily.    midodrine (PROAMATINE) 10 MG tablet Take 1 tablet (10 mg total) by mouth 3 (three) times daily with meals.    pantoprazole (PROTONIX) 40 MG tablet TAKE 1 TABLET ONE TIME DAILY    pravastatin (PRAVACHOL) 40 MG tablet Take 1 tablet (40 mg total) by mouth once daily.    sodium bicarbonate 650 MG tablet Take 2 tablets (1,300 mg total) by mouth 2 (two) times daily.    tamsulosin (FLOMAX) 0.4 mg Cap Take 1 capsule (0.4 mg total) by mouth once daily.    traMADoL (ULTRAM) 50 mg tablet Take 1 tablet (50 mg total) by mouth every 8 (eight) hours as needed for Pain.    HYDROcodone-acetaminophen (NORCO) 5-325 mg per tablet Take 1 tablet by mouth every 12 (twelve) hours as needed for Pain. (Patient not taking: Reported on 1/7/2025)    LIDOcaine (LIDODERM) 5 % Place 1 patch onto the skin once daily. Remove & Discard patch within 12 hours or as directed by MD (Patient not taking: Reported on 1/7/2025)     Current Facility-Administered Medications   Medication    leuprolide acetate (6 month) injection 45 mg    leuprolide acetate (6 month) injection 45 mg     Facility-Administered Medications Ordered in Other Visits   Medication    0.9% NaCl 250 mL flush bag    sodium chloride 0.9% flush 10 mL    sodium chloride 0.9% in 1,000 mL infusion       Review of Systems   Constitutional: Positive for malaise/fatigue. Negative for chills and fever.   HENT:  Negative for nosebleeds.    Eyes: Negative.    Cardiovascular:  Positive for dyspnea on exertion and palpitations.  "Negative for chest pain, leg swelling and syncope.   Respiratory:  Negative for shortness of breath.    Endocrine: Negative.    Hematologic/Lymphatic: Bruises/bleeds easily.   Skin:  Negative for itching and rash.   Musculoskeletal: Negative.    Gastrointestinal:  Negative for abdominal pain and nausea.   Genitourinary:  Negative for hematuria.   Neurological:  Negative for dizziness and light-headedness.   Psychiatric/Behavioral:  Negative for altered mental status.    All other systems reviewed and are negative.      Objective:     BP 94/66 (Patient Position: Sitting)   Pulse 110   Ht 6' 2" (1.88 m)   Wt 76.5 kg (168 lb 10.4 oz)   BMI 21.65 kg/m²     Physical Exam  Vitals and nursing note reviewed.   Constitutional:       General: He is not in acute distress.     Appearance: Normal appearance.   HENT:      Head: Normocephalic and atraumatic.      Mouth/Throat:      Mouth: Mucous membranes are moist.   Eyes:      Extraocular Movements: Extraocular movements intact.   Cardiovascular:      Rate and Rhythm: Regular rhythm. Tachycardia present.   Pulmonary:      Effort: Pulmonary effort is normal. No respiratory distress.   Musculoskeletal:         General: Normal range of motion.      Cervical back: Normal range of motion.   Skin:     General: Skin is warm and dry.   Neurological:      General: No focal deficit present.      Mental Status: He is alert.   Psychiatric:         Mood and Affect: Mood normal.       Lab Results   Component Value Date     12/27/2024    K 5.9 (H) 12/27/2024    MG 1.9 12/20/2024    BUN 55 (H) 12/27/2024    CREATININE 2.5 (H) 12/27/2024    ALT 23 12/27/2024    AST 20 12/27/2024    HGB 11.1 (L) 12/27/2024    HCT 37.6 (L) 12/27/2024    HCT 32 (L) 10/24/2024    TSH 2.763 09/01/2024    LDLCALC 71.0 08/04/2024       Recent Labs   Lab 08/14/24  2113 08/15/24  0910 10/02/24  2008 10/24/24  1728   INR 1.1 1.1 1.3 H 1.2         X-Ray Chest AP Portable    Result Date: 12/19/2024  EXAMINATION: " XR CHEST AP PORTABLE CLINICAL HISTORY: CHF; TECHNIQUE: Single frontal view of the chest was performed. COMPARISON: Chest radiograph October 24, 2024 FINDINGS: Single portable chest radiograph is submitted, suspected cardiac loop recording device noted.  There is mild diminished depth of inspiration, when accounting for position and technique and depth of inspiration the cardiomediastinal silhouette appears stable, appearing enlarged, stable.  Mild aortic atherosclerotic change noted. There is mild prominence of the pulmonary vascular, mild pattern of accentuation of interstitial markings may relate to mild interstitial infiltrate/edema.  Mild patchy alveolar infiltrates are noted. The left costophrenic angle is not included on the field of view, there is no additional evidence for significant pleural effusion and no evidence for pneumothorax. The osseous structures demonstrate chronic change.     Radiographic findings as above. Electronically signed by: Rayo Garcia Date:    12/19/2024 Time:    03:41      Assessment:     1. Paroxysmal atrial fibrillation    2. PAC (premature atrial contraction)    3. On anticoagulant therapy    4. Paroxysmal ventricular tachycardia    5. Coronary artery disease involving native coronary artery of native heart without angina pectoris    6. Essential hypertension    7. HFrEF (heart failure with reduced ejection fraction)        Plan:     In summary, Mr. Charlton is a 74 y.o. male with SBO, s/p colostomy, GERD, parotid mass s/p resection, BPH & prostate CA s/p TURP, CKD 4, pleural plaque, orthostatic hypotension, hypertension, hyperlipidemia, DM2, CAD status post PCI to mid LAD in 2017 (LHC in 2021 showed patent stent, otherwise nonobstructive CAD), who reportedly had VT, atrial tachycardia s/p AT ablation, Atrial fibrillation.     ECG and ILR interrogation reviewed with Dr. Taylor. ECG shows sinus tachycardia with PACs. ILR does show episodes of paroxysmal atrial fibrillation.  [id number] Score is 6 (AGE(>75 (2)), CAD(1), HTN(1), DM(1), CHF/LV DYS(1). Plan to continue Eliquis 5 mg BID. Continue amiodarone loading at 200 mg BID for 4 weeks then decrease to 200 mg daily. He still has 2 more weeks of amiodarone 200 mg BID.     He tells me he cannot take his BB daily due to low normal BP.     He is euvolemic today and still on Lasix. I told him to continue with Lasix daily as he tolerates but hold his Lasix for hypotension.     Had discussion with patient regarding PVI-RFA. Discuss risks and benefits with patient. Possible that his AF with RVR is contributing to his cardiomyopathy. Discuss with patient regarding the nature of RFA PVI including transseptal puncture. We discussed risks and benefits at length. Our discussion included, but was not limited to the risk of death, infection, bleeding, stroke, MI, cardiac perforation, embolism, cardiac tamponade, vascular injury, AE fistula, injury to phrenic nerve, pulmonary vein stenosis and other organic injury including the possibility for need for surgery or pacemaker implantation. We discussed success rates and possible need for redo procedures. Patient verbalized understanding. All questions answered, no further questions or concerns, patient would like to proceed.     Case discussed with Dr. Taylor.   Continue amiodarone load of 200 mg BID for 4 weeks then decrease to 200 mg daily  PVI-RFA  Continue Eliquis 5mg BID. Hold Eliquis only the morning of the procedure.     *A copy of this note has been sent to Dr. Taylor*    Follow up in about 3 months (around 4/7/2025).    ------------------------------------------------------------------    MEGHA Mcclellan, NP-C  Cardiac Electrophysiology

## 2025-01-06 DIAGNOSIS — I47.29 PAROXYSMAL VENTRICULAR TACHYCARDIA: Primary | ICD-10-CM

## 2025-01-07 ENCOUNTER — HOSPITAL ENCOUNTER (OUTPATIENT)
Dept: CARDIOLOGY | Facility: CLINIC | Age: 75
Discharge: HOME | End: 2025-01-07
Payer: MEDICARE

## 2025-01-07 ENCOUNTER — CLINICAL SUPPORT (OUTPATIENT)
Dept: CARDIOLOGY | Facility: HOSPITAL | Age: 75
End: 2025-01-07
Attending: INTERNAL MEDICINE
Payer: MEDICARE

## 2025-01-07 ENCOUNTER — INFUSION (OUTPATIENT)
Dept: INFECTIOUS DISEASES | Facility: HOSPITAL | Age: 75
End: 2025-01-07
Payer: MEDICARE

## 2025-01-07 ENCOUNTER — TELEPHONE (OUTPATIENT)
Dept: ELECTROPHYSIOLOGY | Facility: CLINIC | Age: 75
End: 2025-01-07

## 2025-01-07 ENCOUNTER — OFFICE VISIT (OUTPATIENT)
Dept: ELECTROPHYSIOLOGY | Facility: CLINIC | Age: 75
End: 2025-01-07
Payer: MEDICARE

## 2025-01-07 VITALS
DIASTOLIC BLOOD PRESSURE: 66 MMHG | BODY MASS INDEX: 21.64 KG/M2 | WEIGHT: 168.63 LBS | HEART RATE: 110 BPM | SYSTOLIC BLOOD PRESSURE: 94 MMHG | HEIGHT: 74 IN

## 2025-01-07 VITALS
HEART RATE: 98 BPM | HEIGHT: 74 IN | TEMPERATURE: 98 F | WEIGHT: 167.31 LBS | OXYGEN SATURATION: 100 % | RESPIRATION RATE: 18 BRPM | SYSTOLIC BLOOD PRESSURE: 100 MMHG | BODY MASS INDEX: 21.47 KG/M2 | DIASTOLIC BLOOD PRESSURE: 68 MMHG

## 2025-01-07 DIAGNOSIS — I10 ESSENTIAL HYPERTENSION: ICD-10-CM

## 2025-01-07 DIAGNOSIS — Z79.01 ON ANTICOAGULANT THERAPY: ICD-10-CM

## 2025-01-07 DIAGNOSIS — I47.29 PAROXYSMAL VENTRICULAR TACHYCARDIA: ICD-10-CM

## 2025-01-07 DIAGNOSIS — E87.20 METABOLIC ACIDOSIS WITH NORMAL ANION GAP AND BICARBONATE LOSSES: Primary | ICD-10-CM

## 2025-01-07 DIAGNOSIS — I25.10 CORONARY ARTERY DISEASE INVOLVING NATIVE CORONARY ARTERY OF NATIVE HEART WITHOUT ANGINA PECTORIS: ICD-10-CM

## 2025-01-07 DIAGNOSIS — I49.1 PAC (PREMATURE ATRIAL CONTRACTION): ICD-10-CM

## 2025-01-07 DIAGNOSIS — Z98.890 STATUS POST REPAIR OF VENTRAL HERNIA: ICD-10-CM

## 2025-01-07 DIAGNOSIS — Z87.19 STATUS POST REPAIR OF VENTRAL HERNIA: ICD-10-CM

## 2025-01-07 DIAGNOSIS — I48.0 PAROXYSMAL ATRIAL FIBRILLATION: Primary | ICD-10-CM

## 2025-01-07 DIAGNOSIS — I50.20 HFREF (HEART FAILURE WITH REDUCED EJECTION FRACTION): ICD-10-CM

## 2025-01-07 LAB
OHS QRS DURATION: 140 MS
OHS QTC CALCULATION: 538 MS

## 2025-01-07 PROCEDURE — 93005 ELECTROCARDIOGRAM TRACING: CPT | Mod: S$GLB,,, | Performed by: INTERNAL MEDICINE

## 2025-01-07 PROCEDURE — 96374 THER/PROPH/DIAG INJ IV PUSH: CPT

## 2025-01-07 PROCEDURE — 25000003 PHARM REV CODE 250: Performed by: INTERNAL MEDICINE

## 2025-01-07 PROCEDURE — 63600175 PHARM REV CODE 636 W HCPCS: Performed by: INTERNAL MEDICINE

## 2025-01-07 PROCEDURE — 93010 ELECTROCARDIOGRAM REPORT: CPT | Mod: S$GLB,,, | Performed by: INTERNAL MEDICINE

## 2025-01-07 PROCEDURE — 99999 PR PBB SHADOW E&M-EST. PATIENT-LVL V: CPT | Mod: PBBFAC,,, | Performed by: NURSE PRACTITIONER

## 2025-01-07 RX ORDER — EPINEPHRINE 0.3 MG/.3ML
0.3 INJECTION SUBCUTANEOUS ONCE AS NEEDED
OUTPATIENT
Start: 2025-01-14

## 2025-01-07 RX ORDER — SODIUM CHLORIDE 0.9 % (FLUSH) 0.9 %
10 SYRINGE (ML) INJECTION
OUTPATIENT
Start: 2025-01-14

## 2025-01-07 RX ORDER — SODIUM CHLORIDE 0.9 % (FLUSH) 0.9 %
10 SYRINGE (ML) INJECTION
Status: DISCONTINUED | OUTPATIENT
Start: 2025-01-07 | End: 2025-01-07 | Stop reason: HOSPADM

## 2025-01-07 RX ORDER — HEPARIN 100 UNIT/ML
500 SYRINGE INTRAVENOUS
OUTPATIENT
Start: 2025-01-14

## 2025-01-07 RX ORDER — DIPHENHYDRAMINE HYDROCHLORIDE 50 MG/ML
50 INJECTION INTRAMUSCULAR; INTRAVENOUS ONCE AS NEEDED
OUTPATIENT
Start: 2025-01-14

## 2025-01-07 RX ADMIN — SODIUM CHLORIDE: 9 INJECTION, SOLUTION INTRAVENOUS at 09:01

## 2025-01-07 RX ADMIN — IRON SUCROSE 100 MG: 20 INJECTION, SOLUTION INTRAVENOUS at 09:01

## 2025-01-07 NOTE — PROGRESS NOTES
Patient received dose 2/10 of Venofer 100 mg, given slow IVP over 5 minutes. Patient observed 30 minutes post infusion. Patient tolerated well.    Next appointment scheduled and patient made aware.    Limited head-to-toe assessment due to privacy issues and visit reason though the opportunity was given for patient to express any concerns.

## 2025-01-07 NOTE — TELEPHONE ENCOUNTER
Patient seen in clinic today    ILR at EOS since 10/4/2024 ([facility] was not working at this time)    Patient seeing Marlene Jesusita, NP but will notify MD & RN.

## 2025-01-08 DIAGNOSIS — I25.10 CORONARY ARTERY DISEASE INVOLVING NATIVE CORONARY ARTERY OF NATIVE HEART WITHOUT ANGINA PECTORIS: ICD-10-CM

## 2025-01-08 RX ORDER — PRAVASTATIN SODIUM 40 MG/1
40 TABLET ORAL DAILY
Qty: 90 TABLET | Refills: 3 | Status: SHIPPED | OUTPATIENT
Start: 2025-01-08 | End: 2026-01-03

## 2025-01-08 RX ORDER — COLCHICINE 0.6 MG/1
0.6 TABLET ORAL EVERY OTHER DAY
Qty: 15 TABLET | Refills: 6 | Status: SHIPPED | OUTPATIENT
Start: 2025-01-08

## 2025-01-08 NOTE — TELEPHONE ENCOUNTER
No care due was identified.  [facility] Embedded Care Due Messages. Reference number: [id number].   1/08/2025 12:08:41 PM CST

## 2025-01-08 NOTE — TELEPHONE ENCOUNTER
----- Message from Esteban sent at 1/8/2025 11:56 AM CST -----  Contact: [phone]  Requesting an RX refill or new RX.    Is this a refill or new RX: refill    RX name and strength (copy/paste from chart):  pravastatin (PRAVACHOL) 40 MG tablet    Is this a 30 day or 90 day RX: 90    Pharmacy name and phone # (copy/paste from chart):    [facility] - [facility] [address]  [address] [address]  Phone: [phone] Fax: [phone]       The doctors have asked that we provide their patients with the following 2 reminders -- prescription refills can take up to 72 hours, and a friendly reminder that in the future you can use your [facility] account to request refills:

## 2025-01-10 ENCOUNTER — TELEPHONE (OUTPATIENT)
Dept: INTERNAL MEDICINE | Facility: CLINIC | Age: 75
End: 2025-01-10
Payer: MEDICARE

## 2025-01-10 ENCOUNTER — TELEPHONE (OUTPATIENT)
Dept: ELECTROPHYSIOLOGY | Facility: CLINIC | Age: 75
End: 2025-01-10
Payer: MEDICARE

## 2025-01-10 ENCOUNTER — OFFICE VISIT (OUTPATIENT)
Dept: ORTHOPEDICS | Facility: CLINIC | Age: 75
End: 2025-01-10
Payer: MEDICARE

## 2025-01-10 VITALS — WEIGHT: 168.19 LBS | BODY MASS INDEX: 21.6 KG/M2

## 2025-01-10 DIAGNOSIS — G89.29 CHRONIC LEFT SHOULDER PAIN: ICD-10-CM

## 2025-01-10 DIAGNOSIS — M25.512 CHRONIC LEFT SHOULDER PAIN: ICD-10-CM

## 2025-01-10 DIAGNOSIS — S46.001A ROTATOR CUFF INJURY, RIGHT, INITIAL ENCOUNTER: ICD-10-CM

## 2025-01-10 DIAGNOSIS — M25.511 ACUTE PAIN OF RIGHT SHOULDER: Primary | ICD-10-CM

## 2025-01-10 PROCEDURE — 99214 OFFICE O/P EST MOD 30 MIN: CPT | Mod: S$GLB,,, | Performed by: ORTHOPAEDIC SURGERY

## 2025-01-10 PROCEDURE — 1160F RVW MEDS BY RX/DR IN RCRD: CPT | Mod: CPTII,S$GLB,, | Performed by: ORTHOPAEDIC SURGERY

## 2025-01-10 PROCEDURE — 99999 PR PBB SHADOW E&M-EST. PATIENT-LVL V: CPT | Mod: PBBFAC,,, | Performed by: ORTHOPAEDIC SURGERY

## 2025-01-10 PROCEDURE — 1125F AMNT PAIN NOTED PAIN PRSNT: CPT | Mod: CPTII,S$GLB,, | Performed by: ORTHOPAEDIC SURGERY

## 2025-01-10 PROCEDURE — 1159F MED LIST DOCD IN RCRD: CPT | Mod: CPTII,S$GLB,, | Performed by: ORTHOPAEDIC SURGERY

## 2025-01-10 NOTE — TELEPHONE ENCOUNTER
Spoke with patient and scheduled on 4/4/25. Medications, allergies, and presence of implanted devices reviewed with patient - Pt has an ILR in situ at [facility]. Discussed with patient that the device will not be replaced at this time, pt does not wish to have it removed. Procedure instructions reviewed with patient, will follow up with written instructions via mail and portal per patient's preference. Pt instructed to notify office of any changes in medications or medical condition.    ----- Message from IMMANUEL Mcclellan sent at 1/7/2025  1:06 PM CST -----  Regarding: schedule PVI  Eloy uMñoz,     I just saw this patient today in clinic. I discuss this case with Dr. Taylor and he agrees with PVI-Rfa for patient.     Patient is agreeable with moving forward. I told him you were going to call him to schedule.     Thanks,     Marlene

## 2025-01-10 NOTE — TELEPHONE ENCOUNTER
----- Message from Med Assistant Crain sent at 1/10/2025  3:24 PM CST -----  Regarding: FW: refill  Contact: [phone]    ----- Message -----  From: An Pleitez  Sent: 1/10/2025  12:37 PM CST  To: [facility] Clinical Staff; Izabella NEAL Staff  Subject: refill                                           ..Type:  Needs Medical Advice    Who Called: pt   reason (please be specific): stated he normally gets a 90 day supply of medications, as his pharmacy will fill for that and prefers that. Pt wants to know why he is not getting 90 day supply of the medicine listed   Additional info:  Ngozi Bonner is listed as the person that sent the medicine, but I could not find in the system to message directly. Pt stated it was supposed to be filled by Dr Parekh  Pharmacy name and phone #:  ..  [facility] - [place], [place] - [address]  [address] [address]  Phone: [phone] Fax: [phone]    Would the patient rather a call back or a response via [facility]? Call back   Best Call Back Number: [phone]  Additional Information: colchicine (COLCRYS) 0.6 mg tablet

## 2025-01-13 RX ORDER — COLCHICINE 0.6 MG/1
0.6 TABLET ORAL EVERY OTHER DAY
Qty: 45 TABLET | Refills: 3 | Status: SHIPPED | OUTPATIENT
Start: 2025-01-13 | End: 2026-01-08

## 2025-01-13 NOTE — PROGRESS NOTES
"Subjective:       Patient ID: Jarrett Charlton Jr. is a 74 y.o. male.    Chief Complaint:   Pain of the Right Shoulder    History of Present Illness    CHIEF COMPLAINT:  Patient presents today for initial evaluation of right shoulder pain following an injury that occurred approximately three months ago.    HPI:  Patient presents with right shoulder pain that has been ongoing for three months, which began following an accident where he hit his shoulder. Since the injury, he reports limited range of motion, stating he can't lift his arm to shoulder level or touch the top of his head. He describes pain when moving the arm in certain directions and soreness in the lower part of the shoulder.    Patient has attempted to manage the pain with tramadol, prescribed by his primary care physician, Dr. Tripp Mohan. He reports a history of heart problems, including a recent hospitalization for a heart failure flare-up and multiple visits to diagnose blood clots. Patient states, "I had to visit the hospital multiple times before they diagnosed my blood clots." He is scheduled for another cardiac ablation in a few months and expresses concern about his age in relation to potential treatment options, stating, "I'm concerned about treatment options due to my age of 74."    Patient denies having arthritis in the shoulder joint.    MEDICATIONS:  Patient is on Tramadol for shoulder pain.    SURGICAL HISTORY:  Patient has a history of cardiac stent placement approximately 20 years ago.        Past Medical History:   Diagnosis Date    Anticoagulant long-term use     Basal cell carcinoma     BPH (benign prostatic hyperplasia)     s/p TURP    Carotid stenosis     Chronic kidney disease     Claudication     DDD (degenerative disc disease) 10/21/2013    Diabetes mellitus with renal complications     Disc disease, degenerative, cervical     DVT (deep venous thrombosis)     Encounter for blood transfusion     GERD (gastroesophageal reflux " disease)     Gout, chronic     History of ulcerative colitis     s/p colectomy and ileostomy    HLD (hyperlipidemia)     HTN (hypertension)     Ileostomy in place 1982    Paroxysmal atrial fibrillation     RBBB     Squamous cell carcinoma 03/08/2018    Left superior helix near insertion    Squamous cell carcinoma 04/12/2018    Left forearm x 5    Syncope 07/06/2024    Ventricular tachycardia      Past Surgical History:   Procedure Laterality Date    ABLATION, SVT, ACCESSORY PATHWAY N/A 4/30/2024    Procedure: Ablation, SVT, Accessory Pathway;  Surgeon: Franky Taylor MD;  Location: [facility] EP LAB;  Service: Cardiology;  Laterality: N/A;  VT, RFA, Carto, MAC, GP, 3 Prep *MDT ILR in situ*    cardiac stents      CATARACT EXTRACTION Bilateral     CERVICAL FUSION      CHOLECYSTECTOMY N/A 2/14/2023    Procedure: CHOLECYSTECTOMY;  Surgeon: Mike Joyce MD;  Location: [facility];  Service: General;  Laterality: N/A;  very high probabilty of converison to open    colectomy and ileostomy  1985    ENDOSCOPIC ULTRASOUND OF UPPER GASTROINTESTINAL TRACT N/A 2/10/2023    Procedure: ULTRASOUND, UPPER GI TRACT, ENDOSCOPIC;  Surgeon: Poli Fuller MD;  Location: [facility] ENDO;  Service: Endoscopy;  Laterality: N/A;    ERCP N/A 2/10/2023    Procedure: ERCP (ENDOSCOPIC RETROGRADE CHOLANGIOPANCREATOGRAPHY);  Surgeon: Poli Fuller MD;  Location: [facility];  Service: Endoscopy;  Laterality: N/A;    EXCISION OF PAROTID GLAND Left 12/18/2020    Procedure: EXCISION, PAROTID GLAND;  Surgeon: Michael Pinzon MD;  [address];  Service: ENT;  Laterality: Left;    INSERTION OF IMPLANTABLE LOOP RECORDER  06/07/2021    INSERTION OF IMPLANTABLE LOOP RECORDER Left 6/7/2021    Procedure: INSERTION, IMPLANTABLE LOOP RECORDER;  Surgeon: Romario Dao MD;  Location: [facility] CATH LAB;  Service: Cardiology;  Laterality: Left;    LEFT HEART CATHETERIZATION Right 4/15/2021    Procedure: CATHETERIZATION, HEART, LEFT;  Surgeon: Marcio  MD Robert;  Location: [facility] CATH LAB;  Service: Cardiology;  Laterality: Right;    LYSIS OF ADHESIONS N/A 11/9/2020    Procedure: LYSIS, ADHESIONS,  ERAS low;  Surgeon: CED Haley MD;  Location: [facility] OR 2ND FLR;  Service: Colon and Rectal;  Laterality: N/A;    LYSIS OF ADHESIONS N/A 2/14/2023    Procedure: LYSIS, ADHESIONS;  Surgeon: Mike Joyce MD;  Location: [facility];  Service: General;  Laterality: N/A;    POUCHOSCOPY N/A 4/6/2022    Procedure: ENDOSCOPY, POUCH, SMALL INTESTINE, DIAGNOSTIC;  Surgeon: Dieter Juarez MD;  Location: [facility] ENDO (2ND FLR);  Service: Endoscopy;  Laterality: N/A;    REPAIR, HERNIA, PARASTOMAL N/A 11/9/2020    Procedure: REPAIR, HERNIA, PARASTOMAL;  Surgeon: CED Haley MD;  Location: [facility] OR 2ND FLR;  Service: Colon and Rectal;  Laterality: N/A;    TRANSURETHRAL RESECTION OF PROSTATE (TURP) WITHOUT USE OF LASER N/A 1/23/2019    Procedure: TURP, WITHOUT USING LASER BIPOLAR;  Surgeon: Catarino Mota MD;  Location: [facility] OR 1ST FLR;  Service: Urology;  Laterality: N/A;  1.5 HOURS    TREATMENT OF CARDIAC ARRHYTHMIA  4/30/2024    Procedure: Cardioversion or Defibrillation;  Surgeon: Franky Taylor MD;  Location: [facility] EP LAB;  Service: Cardiology;;     Family History   Problem Relation Name Age of Onset    Dementia Mother      Cancer Father      Diabetes Father      Heart disease Father      Melanoma Neg Hx      Hypertension Neg Hx      Arthritis Neg Hx       Social History     Socioeconomic History    Marital status:     Number of children: 1   Occupational History    Occupation:  x 44 years     Comment: Retired    Occupation: [place]    Tobacco Use    Smoking status: Never     Passive exposure: Never    Smokeless tobacco: Never   Substance and Sexual Activity    Alcohol use: No    Drug use: No    Sexual activity: Not Currently     Partners: Female     Social Drivers of Health     Financial Resource Strain: Low Risk   (12/19/2024)    Overall Financial Resource Strain (CARDIA)     Difficulty of Paying Living Expenses: Not hard at all   Food Insecurity: No Food Insecurity (12/19/2024)    Hunger Vital Sign     Worried About Running Out of Food in the Last Year: Never true     Ran Out of Food in the Last Year: Never true   Transportation Needs: No Transportation Needs (12/19/2024)    TRANSPORTATION NEEDS     Transportation : No   Physical Activity: Sufficiently Active (10/3/2024)    Exercise Vital Sign     Days of Exercise per Week: 7 days     Minutes of Exercise per Session: 150+ min   Recent Concern: Physical Activity - Inactive (8/29/2024)    Exercise Vital Sign     Days of Exercise per Week: 0 days     Minutes of Exercise per Session: 0 min   Stress: No Stress Concern Present (12/19/2024)    [employer] - Occupational Stress Questionnaire     Feeling of Stress : Not at all   Recent Concern: Stress - Stress Concern Present (10/8/2024)    [employer] - Occupational Stress Questionnaire     Feeling of Stress : To some extent   Housing Stability: Low Risk  (12/19/2024)    Housing Stability Vital Sign     Unable to Pay for Housing in the Last Year: No     Homeless in the Last Year: No       Current Outpatient Medications   Medication Sig Dispense Refill    ACCU-CHEK PAUL CONTROL SOLN Soln 1 drop by NOT APPLICABLE route once daily.      ACCU-CHEK SOFTCLIX LANCETS Misc TEST BLOOD SUGAR THREE TIMES DAILY 300 each 3    alcohol swabs (DROPSAFE ALCOHOL PREP PADS) PadM Apply 1 each topically once daily. 500 each 3    allopurinoL (ZYLOPRIM) 100 MG tablet Take 4 tablets (400 mg total) by mouth once daily. 360 tablet 4    amiodarone (PACERONE) 200 MG Tab Take 1 tablet (200 mg total) by mouth 2 (two) times daily. 180 tablet 3    apixaban (ELIQUIS) 5 mg Tab Take 1 tablet (5 mg total) by mouth 2 (two) times daily. 180 tablet 3    aspirin (ECOTRIN) 81 MG EC tablet Take 1 tablet (81 mg  total) by mouth once daily. 30 tablet 11    blood sugar diagnostic (ACCU-CHEK GUIDE TEST STRIPS) Strp 1 each by Other route 3 (three) times daily. Test Blood Sugar 300 strip 10    blood-glucose meter (ACCU-CHEK GUIDE GLUCOSE METER) [facility] Accucheck BID 1 each 0    brimonidine 0.2% (ALPHAGAN) 0.2 % Drop Place 1 drop into both eyes 2 (two) times a day. 60 mL 3    colchicine (COLCRYS) 0.6 mg tablet Take 1 tablet (0.6 mg total) by mouth every other day. 15 tablet 6    ferrous sulfate (IRON) 325 mg (65 mg iron) Tab tablet Take 1 tablet (325 mg total) by mouth every other day. 45 tablet 3    furosemide (LASIX) 40 MG tablet Take 1 tablet (40 mg total) by mouth 2 (two) times daily. 60 tablet 2    glimepiride (AMARYL) 4 MG tablet TAKE 1 TABLET TWICE DAILY BEFORE MEALS 180 tablet 3    hydrALAZINE (APRESOLINE) 10 MG tablet Take by mouth.      HYDROcodone-acetaminophen (NORCO) 5-325 mg per tablet Take 1 tablet by mouth every 12 (twelve) hours as needed for Pain. (Patient not taking: Reported on 1/7/2025) 20 tablet 0    LIDOcaine (LIDODERM) 5 % Place 1 patch onto the skin once daily. Remove & Discard patch within 12 hours or as directed by MD (Patient not taking: Reported on 1/7/2025) 30 patch 0    magnesium oxide (MAG-OX) 400 mg (241.3 mg magnesium) tablet Take 400 mg by mouth once daily.      metoprolol succinate (TOPROL-XL) 25 MG 24 hr tablet Take 1 tablet (25 mg total) by mouth once daily. 90 tablet 3    midodrine (PROAMATINE) 10 MG tablet Take 1 tablet (10 mg total) by mouth 3 (three) times daily with meals. 270 tablet 3    pantoprazole (PROTONIX) 40 MG tablet TAKE 1 TABLET ONE TIME DAILY 90 tablet 3    pravastatin (PRAVACHOL) 40 MG tablet Take 1 tablet (40 mg total) by mouth once daily. 90 tablet 3    sodium bicarbonate 650 MG tablet Take 2 tablets (1,300 mg total) by mouth 2 (two) times daily. 360 tablet 3    tamsulosin (FLOMAX) 0.4 mg Cap Take 1 capsule (0.4 mg total) by mouth once daily. 90 capsule 3    traMADoL (ULTRAM)  50 mg tablet Take 1 tablet (50 mg total) by mouth every 8 (eight) hours as needed for Pain. 60 each 0     Current Facility-Administered Medications   Medication Dose Route Frequency Provider Last Rate Last Admin    leuprolide acetate (6 month) injection 45 mg  45 mg Intramuscular Q6 Months    45 mg at 05/14/24 0941    leuprolide acetate (6 month) injection 45 mg  45 mg Intramuscular Q6 Months          Facility-Administered Medications Ordered in Other Visits   Medication Dose Route Frequency Provider Last Rate Last Admin    sodium chloride 0.9% in 1,000 mL infusion   Intravenous Continuous Order, Paper         Review of patient's allergies indicates:   Allergen Reactions    Atorvastatin     Rosuvastatin          Objective:      Vitals:    01/10/25 1009   Weight: 76.3 kg (168 lb 3.4 oz)     Physical Exam  He cannot raise his arm to the chest level.  Positive empty can, positive drop-arm sign.  Periacromial tenderness.  No swelling, ecchymosis, erythema.  Normal range of motion of the elbow, wrist and hand.  Distal neurovascular intact.    IMAGING:  Patient underwent X-rays of the shoulder, which showed mild degenerative changes at the glenohumeral and AC joints.  There may be some decrease in the acromiohumeral interval.         Assessment/Plan   Likely rotator cuff tear, right shoulder       Ordered MRI scan of the shoulder to evaluate for potential rotator cuff tear.  Referred to Dr. Villeda, a [facility] specialist at [facility], for evaluation and potential treatment of suspected rotator cuff tear.  Discussed potential surgical repair of rotator cuff, pending MRI results and consultation with specialist.          The patient's pathophysiology was explained in detail with reference to x-rays, models, other visual aids as appropriate.  Treatment options were discussed in detail.  Questions were invited and answered to the patient's satisfaction.    This note was generated with the assistance of ambient listening  technology. Verbal consent was obtained by the patient and accompanying visitor(s) for the recording of patient appointment to facilitate this note. I attest to having reviewed and edited the generated note for accuracy, though some syntax or spelling errors may persist. Please contact the author of this note for any clarification.     John aPul Mason MD  Orthopaedic Surgery

## 2025-01-14 ENCOUNTER — TELEPHONE (OUTPATIENT)
Dept: INTERNAL MEDICINE | Facility: CLINIC | Age: 75
End: 2025-01-14
Payer: MEDICARE

## 2025-01-14 ENCOUNTER — INFUSION (OUTPATIENT)
Dept: INFECTIOUS DISEASES | Facility: HOSPITAL | Age: 75
End: 2025-01-14
Payer: MEDICARE

## 2025-01-14 VITALS
RESPIRATION RATE: 20 BRPM | OXYGEN SATURATION: 100 % | DIASTOLIC BLOOD PRESSURE: 90 MMHG | WEIGHT: 172.06 LBS | HEART RATE: 115 BPM | TEMPERATURE: 98 F | BODY MASS INDEX: 22.08 KG/M2 | SYSTOLIC BLOOD PRESSURE: 138 MMHG | HEIGHT: 74 IN

## 2025-01-14 DIAGNOSIS — I25.10 CORONARY ARTERY DISEASE INVOLVING NATIVE CORONARY ARTERY OF NATIVE HEART WITHOUT ANGINA PECTORIS: ICD-10-CM

## 2025-01-14 DIAGNOSIS — Z87.19 STATUS POST REPAIR OF VENTRAL HERNIA: ICD-10-CM

## 2025-01-14 DIAGNOSIS — Z98.890 STATUS POST REPAIR OF VENTRAL HERNIA: ICD-10-CM

## 2025-01-14 DIAGNOSIS — E87.20 METABOLIC ACIDOSIS WITH NORMAL ANION GAP AND BICARBONATE LOSSES: Primary | ICD-10-CM

## 2025-01-14 PROCEDURE — 96374 THER/PROPH/DIAG INJ IV PUSH: CPT

## 2025-01-14 PROCEDURE — 63600175 PHARM REV CODE 636 W HCPCS: Performed by: INTERNAL MEDICINE

## 2025-01-14 RX ORDER — DIPHENHYDRAMINE HYDROCHLORIDE 50 MG/ML
50 INJECTION INTRAMUSCULAR; INTRAVENOUS ONCE AS NEEDED
OUTPATIENT
Start: 2025-01-21

## 2025-01-14 RX ORDER — EPINEPHRINE 0.3 MG/.3ML
0.3 INJECTION SUBCUTANEOUS ONCE AS NEEDED
Status: DISCONTINUED | OUTPATIENT
Start: 2025-01-14 | End: 2025-01-14 | Stop reason: HOSPADM

## 2025-01-14 RX ORDER — PRAVASTATIN SODIUM 40 MG/1
40 TABLET ORAL DAILY
Qty: 90 TABLET | Refills: 3 | Status: SHIPPED | OUTPATIENT
Start: 2025-01-14 | End: 2026-01-09

## 2025-01-14 RX ORDER — DIPHENHYDRAMINE HYDROCHLORIDE 50 MG/ML
50 INJECTION INTRAMUSCULAR; INTRAVENOUS ONCE AS NEEDED
Status: DISCONTINUED | OUTPATIENT
Start: 2025-01-14 | End: 2025-01-14 | Stop reason: HOSPADM

## 2025-01-14 RX ORDER — HEPARIN 100 UNIT/ML
500 SYRINGE INTRAVENOUS
Status: DISCONTINUED | OUTPATIENT
Start: 2025-01-14 | End: 2025-01-14 | Stop reason: HOSPADM

## 2025-01-14 RX ORDER — SODIUM CHLORIDE 0.9 % (FLUSH) 0.9 %
10 SYRINGE (ML) INJECTION
OUTPATIENT
Start: 2025-01-21

## 2025-01-14 RX ORDER — SODIUM CHLORIDE 0.9 % (FLUSH) 0.9 %
10 SYRINGE (ML) INJECTION
Status: DISCONTINUED | OUTPATIENT
Start: 2025-01-14 | End: 2025-01-14 | Stop reason: HOSPADM

## 2025-01-14 RX ORDER — EPINEPHRINE 0.3 MG/.3ML
0.3 INJECTION SUBCUTANEOUS ONCE AS NEEDED
OUTPATIENT
Start: 2025-01-21

## 2025-01-14 RX ORDER — HEPARIN 100 UNIT/ML
500 SYRINGE INTRAVENOUS
OUTPATIENT
Start: 2025-01-21

## 2025-01-14 RX ADMIN — IRON SUCROSE 100 MG: 20 INJECTION, SOLUTION INTRAVENOUS at 09:01

## 2025-01-14 NOTE — PROGRESS NOTES
Patient received dose 3/10 of Venofer 100 mg, given slow IVP over 5 minutes. Patient observed 30 minutes post infusion. Patient tolerated well.    Next appointment scheduled and patient made aware.    Limited head-to-toe assessment due to privacy issues and visit reason though the opportunity was given for patient to express any concerns.

## 2025-01-14 NOTE — PLAN OF CARE
Patient counseled on monitoring and reporting fatigue to provider - verbalized understanding      Qbrexza Pregnancy And Lactation Text: There is no available data on Qbrexza use in pregnant women.  There is no available data on Qbrexza use in lactation.

## 2025-01-15 NOTE — TELEPHONE ENCOUNTER
----- Message from Med Assistant Garcia sent at 1/14/2025 11:25 AM CST -----  Contact: [phone]    ----- Message -----  From: Pau Bonner  Sent: 1/14/2025  11:23 AM CST  To: [facility] Staff    Requesting an RX refill or new RX.    Is this a refill or new RX: Refill    RX name and strength (copy/paste from chart):  pravastatin (PRAVACHOL) 40 MG tablet    Is this a 30 day or 90 day RX: 90    Pharmacy name and phone # (copy/paste from chart):    [facility] - [place], [place] - [address]  [address] [address]  Phone: [phone] Fax: [phone]        The doctors have asked that we provide their patients with the following 2 reminders -- prescription refills can take up to 72 hours, and a friendly reminder that in the future you can use your [facility] account to request refills: y

## 2025-01-17 ENCOUNTER — TELEPHONE (OUTPATIENT)
Dept: INFECTIOUS DISEASES | Facility: HOSPITAL | Age: 75
End: 2025-01-17
Payer: MEDICARE

## 2025-01-17 DIAGNOSIS — E11.22 CKD STAGE 4 DUE TO TYPE 2 DIABETES MELLITUS: Primary | ICD-10-CM

## 2025-01-17 DIAGNOSIS — N18.4 CKD STAGE 4 DUE TO TYPE 2 DIABETES MELLITUS: Primary | ICD-10-CM

## 2025-01-17 NOTE — TELEPHONE ENCOUNTER
Spoke with patient about anticipated inclement weather for upcoming appointment on 1.22.25 and told patient to get here safely and we would administer medications at whatever time he arrived as to avoid re-scheduling - patient agreed and has our number for any issues

## 2025-01-21 ENCOUNTER — CLINICAL SUPPORT (OUTPATIENT)
Dept: CARDIOLOGY | Facility: HOSPITAL | Age: 75
End: 2025-01-21
Attending: INTERNAL MEDICINE
Payer: MEDICARE

## 2025-01-21 DIAGNOSIS — R55 SYNCOPE AND COLLAPSE: ICD-10-CM

## 2025-01-21 DIAGNOSIS — I47.10 SUPRAVENTRICULAR TACHYCARDIA, UNSPECIFIED: ICD-10-CM

## 2025-01-21 PROCEDURE — 93298 REM INTERROG DEV EVAL SCRMS: CPT | Mod: HCNC | Performed by: INTERNAL MEDICINE

## 2025-01-24 ENCOUNTER — HOSPITAL ENCOUNTER (INPATIENT)
Facility: OTHER | Age: 75
LOS: 6 days | Discharge: HOME | DRG: 299 | End: 2025-01-30
Attending: HOSPITALIST | Admitting: HOSPITALIST
Payer: MEDICARE

## 2025-01-24 DIAGNOSIS — R07.9 ACUTE CHEST PAIN: ICD-10-CM

## 2025-01-24 DIAGNOSIS — I82.409 RECURRENT DEEP VEIN THROMBOSIS (DVT): ICD-10-CM

## 2025-01-24 DIAGNOSIS — I50.9 HEART FAILURE: ICD-10-CM

## 2025-01-24 DIAGNOSIS — K86.9 PANCREATIC LESION: ICD-10-CM

## 2025-01-24 DIAGNOSIS — I50.43 ACUTE ON CHRONIC COMBINED SYSTOLIC AND DIASTOLIC HEART FAILURE: Primary | ICD-10-CM

## 2025-01-24 PROBLEM — I82.402 ACUTE DEEP VEIN THROMBOSIS (DVT) OF LEFT LOWER EXTREMITY: Status: ACTIVE | Noted: 2025-01-24

## 2025-01-24 LAB
ALBUMIN SERPL BCP-MCNC: 3.1 G/DL (ref 3.5–5.2)
ALP SERPL-CCNC: 138 U/L (ref 40–150)
ALT SERPL W/O P-5'-P-CCNC: 18 U/L (ref 10–44)
ANION GAP SERPL CALC-SCNC: 14 MMOL/L (ref 8–16)
APTT PPP: 31 SEC (ref 21–32)
APTT PPP: 74.9 SEC (ref 21–32)
AST SERPL-CCNC: 13 U/L (ref 10–40)
AV INDEX (PROSTH): 1.2
AV MEAN GRADIENT: 2 MMHG
AV PEAK GRADIENT: 3 MMHG
AV VALVE AREA BY VELOCITY RATIO: 4.2 CM²
AV VALVE AREA: 5 CM²
AV VELOCITY RATIO: 1
BASOPHILS # BLD AUTO: 0.02 K/UL (ref 0–0.2)
BASOPHILS # BLD AUTO: 0.02 K/UL (ref 0–0.2)
BASOPHILS NFR BLD: 0.3 % (ref 0–1.9)
BASOPHILS NFR BLD: 0.3 % (ref 0–1.9)
BILIRUB SERPL-MCNC: 1.1 MG/DL (ref 0.1–1)
BNP SERPL-MCNC: 2586 PG/ML (ref 0–99)
BSA FOR ECHO PROCEDURE: 1.95 M2
BUN SERPL-MCNC: 54 MG/DL (ref 8–23)
CALCIUM SERPL-MCNC: 9.2 MG/DL (ref 8.7–10.5)
CHLORIDE SERPL-SCNC: 101 MMOL/L (ref 95–110)
CO2 SERPL-SCNC: 24 MMOL/L (ref 23–29)
CREAT SERPL-MCNC: 2.6 MG/DL (ref 0.5–1.4)
CV ECHO LV RWT: 0.27 CM
DIFFERENTIAL METHOD BLD: ABNORMAL
DIFFERENTIAL METHOD BLD: ABNORMAL
DOP CALC AO PEAK VEL: 0.8 M/S
DOP CALC AO VTI: 10.4 CM
DOP CALC LVOT AREA: 4.2 CM2
DOP CALC LVOT DIAMETER: 2.3 CM
DOP CALC LVOT PEAK VEL: 0.8 M/S
DOP CALC LVOT STROKE VOLUME: 51.9 CM3
DOP CALCLVOT PEAK VEL VTI: 12.5 CM
E WAVE DECELERATION TIME: 132 MSEC
ECHO LV POSTERIOR WALL: 0.8 CM (ref 0.6–1.1)
EJECTION FRACTION: 30 %
EOSINOPHIL # BLD AUTO: 0 K/UL (ref 0–0.5)
EOSINOPHIL # BLD AUTO: 0 K/UL (ref 0–0.5)
EOSINOPHIL NFR BLD: 0.3 % (ref 0–8)
EOSINOPHIL NFR BLD: 0.3 % (ref 0–8)
ERYTHROCYTE [DISTWIDTH] IN BLOOD BY AUTOMATED COUNT: 20.2 % (ref 11.5–14.5)
ERYTHROCYTE [DISTWIDTH] IN BLOOD BY AUTOMATED COUNT: 20.5 % (ref 11.5–14.5)
EST. GFR  (NO RACE VARIABLE): 25 ML/MIN/1.73 M^2
FRACTIONAL SHORTENING: 11.9 % (ref 28–44)
GLUCOSE SERPL-MCNC: 121 MG/DL (ref 70–110)
HCT VFR BLD AUTO: 37.4 % (ref 40–54)
HCT VFR BLD AUTO: 39.5 % (ref 40–54)
HGB BLD-MCNC: 11.1 G/DL (ref 14–18)
HGB BLD-MCNC: 11.6 G/DL (ref 14–18)
IMM GRANULOCYTES # BLD AUTO: 0.01 K/UL (ref 0–0.04)
IMM GRANULOCYTES # BLD AUTO: 0.03 K/UL (ref 0–0.04)
IMM GRANULOCYTES NFR BLD AUTO: 0.2 % (ref 0–0.5)
IMM GRANULOCYTES NFR BLD AUTO: 0.4 % (ref 0–0.5)
INR PPP: 1.4 (ref 0.8–1.2)
INTERVENTRICULAR SEPTUM: 0.9 CM (ref 0.6–1.1)
IVC DIAMETER: 2.6 CM
IVRT: 46 MSEC
LA MAJOR: 7.5 CM
LA MINOR: 6.6 CM
LA WIDTH: 4.8 CM
LEFT ATRIUM SIZE: 4.6 CM
LEFT ATRIUM VOLUME INDEX: 67 ML/M2
LEFT ATRIUM VOLUME: 132 CM3
LEFT INTERNAL DIMENSION IN SYSTOLE: 5.2 CM (ref 2.1–4)
LEFT VENTRICLE DIASTOLIC VOLUME INDEX: 86.84 ML/M2
LEFT VENTRICLE DIASTOLIC VOLUME: 171.94 ML
LEFT VENTRICLE MASS INDEX: 98.5 G/M2
LEFT VENTRICLE SYSTOLIC VOLUME INDEX: 66.6 ML/M2
LEFT VENTRICLE SYSTOLIC VOLUME: 131.9 ML
LEFT VENTRICULAR INTERNAL DIMENSION IN DIASTOLE: 5.9 CM (ref 3.5–6)
LEFT VENTRICULAR MASS: 195 G
LVED V (TEICH): 171.94 ML
LVES V (TEICH): 131.9 ML
LVOT MG: 1.45 MMHG
LVOT MV: 0.58 CM/S
LYMPHOCYTES # BLD AUTO: 0.8 K/UL (ref 1–4.8)
LYMPHOCYTES # BLD AUTO: 1.2 K/UL (ref 1–4.8)
LYMPHOCYTES NFR BLD: 12.7 % (ref 18–48)
LYMPHOCYTES NFR BLD: 15.4 % (ref 18–48)
MAGNESIUM SERPL-MCNC: 1.9 MG/DL (ref 1.6–2.6)
MCH RBC QN AUTO: 26.6 PG (ref 27–31)
MCH RBC QN AUTO: 27 PG (ref 27–31)
MCHC RBC AUTO-ENTMCNC: 29.4 G/DL (ref 32–36)
MCHC RBC AUTO-ENTMCNC: 29.7 G/DL (ref 32–36)
MCV RBC AUTO: 90 FL (ref 82–98)
MCV RBC AUTO: 92 FL (ref 82–98)
MONOCYTES # BLD AUTO: 0.5 K/UL (ref 0.3–1)
MONOCYTES # BLD AUTO: 0.6 K/UL (ref 0.3–1)
MONOCYTES NFR BLD: 7.2 % (ref 4–15)
MONOCYTES NFR BLD: 7.8 % (ref 4–15)
MV PEAK E VEL: 0.94 M/S
MV STENOSIS PRESSURE HALF TIME: 38.15 MS
MV VALVE AREA P 1/2 METHOD: 5.77 CM2
NEUTROPHILS # BLD AUTO: 5.1 K/UL (ref 1.8–7.7)
NEUTROPHILS # BLD AUTO: 5.8 K/UL (ref 1.8–7.7)
NEUTROPHILS NFR BLD: 75.8 % (ref 38–73)
NEUTROPHILS NFR BLD: 79.3 % (ref 38–73)
NRBC BLD-RTO: 0 /100 WBC
NRBC BLD-RTO: 0 /100 WBC
OHS QRS DURATION: 152 MS
OHS QTC CALCULATION: 499 MS
PHOSPHATE SERPL-MCNC: 3.8 MG/DL (ref 2.7–4.5)
PISA MRMAX VEL: 4.48 M/S
PISA TR MAX VEL: 2.6 M/S
PLATELET # BLD AUTO: 108 K/UL (ref 150–450)
PLATELET # BLD AUTO: 116 K/UL (ref 150–450)
PMV BLD AUTO: 11.1 FL (ref 9.2–12.9)
PMV BLD AUTO: ABNORMAL FL (ref 9.2–12.9)
POCT GLUCOSE: 103 MG/DL (ref 70–110)
POCT GLUCOSE: 180 MG/DL (ref 70–110)
POCT GLUCOSE: 203 MG/DL (ref 70–110)
POTASSIUM SERPL-SCNC: 3.9 MMOL/L (ref 3.5–5.1)
PROT SERPL-MCNC: 6.6 G/DL (ref 6–8.4)
PROTHROMBIN TIME: 15.8 SEC (ref 9–12.5)
PV PEAK GRADIENT: 1 MMHG
PV PEAK S VEL: 0.61 M/S
PV PEAK VELOCITY: 0.58 M/S
RA MAJOR: 5.91 CM
RA PRESSURE ESTIMATED: 15 MMHG
RA WIDTH: 5.2 CM
RBC # BLD AUTO: 4.17 M/UL (ref 4.6–6.2)
RBC # BLD AUTO: 4.29 M/UL (ref 4.6–6.2)
RV TB RVSP: 18 MMHG
SODIUM SERPL-SCNC: 139 MMOL/L (ref 136–145)
TR MAX PG: 27 MMHG
TROPONIN I SERPL DL<=0.01 NG/ML-MCNC: 0.07 NG/ML (ref 0–0.03)
TV REST PULMONARY ARTERY PRESSURE: 42 MMHG
WBC # BLD AUTO: 6.39 K/UL (ref 3.9–12.7)
WBC # BLD AUTO: 7.68 K/UL (ref 3.9–12.7)
Z-SCORE OF LEFT VENTRICULAR DIMENSION IN END DIASTOLE: 0.32
Z-SCORE OF LEFT VENTRICULAR DIMENSION IN END SYSTOLE: 3.1

## 2025-01-24 PROCEDURE — 36415 COLL VENOUS BLD VENIPUNCTURE: CPT | Mod: HCNC | Performed by: HOSPITALIST

## 2025-01-24 PROCEDURE — 36415 COLL VENOUS BLD VENIPUNCTURE: CPT | Mod: HCNC | Performed by: NURSE PRACTITIONER

## 2025-01-24 PROCEDURE — 85610 PROTHROMBIN TIME: CPT | Mod: HCNC | Performed by: HOSPITALIST

## 2025-01-24 PROCEDURE — 83735 ASSAY OF MAGNESIUM: CPT | Mod: HCNC | Performed by: NURSE PRACTITIONER

## 2025-01-24 PROCEDURE — 63600175 PHARM REV CODE 636 W HCPCS: Performed by: HOSPITALIST

## 2025-01-24 PROCEDURE — 84484 ASSAY OF TROPONIN QUANT: CPT | Mod: HCNC | Performed by: NURSE PRACTITIONER

## 2025-01-24 PROCEDURE — 99222 1ST HOSP IP/OBS MODERATE 55: CPT | Mod: HCNC,,, | Performed by: INTERNAL MEDICINE

## 2025-01-24 PROCEDURE — 63600175 PHARM REV CODE 636 W HCPCS: Performed by: NURSE PRACTITIONER

## 2025-01-24 PROCEDURE — 80053 COMPREHEN METABOLIC PANEL: CPT | Mod: HCNC | Performed by: NURSE PRACTITIONER

## 2025-01-24 PROCEDURE — 93010 ELECTROCARDIOGRAM REPORT: CPT | Mod: HCNC,,, | Performed by: INTERNAL MEDICINE

## 2025-01-24 PROCEDURE — 25000003 PHARM REV CODE 250: Performed by: NURSE PRACTITIONER

## 2025-01-24 PROCEDURE — 85025 COMPLETE CBC W/AUTO DIFF WBC: CPT | Mod: HCNC | Performed by: NURSE PRACTITIONER

## 2025-01-24 PROCEDURE — 84100 ASSAY OF PHOSPHORUS: CPT | Mod: HCNC | Performed by: NURSE PRACTITIONER

## 2025-01-24 PROCEDURE — A9567 TECHNETIUM TC-99M AEROSOL: HCPCS | Mod: HCNC | Performed by: HOSPITALIST

## 2025-01-24 PROCEDURE — 83880 ASSAY OF NATRIURETIC PEPTIDE: CPT | Mod: HCNC | Performed by: NURSE PRACTITIONER

## 2025-01-24 PROCEDURE — 21400001 HC TELEMETRY ROOM: Mod: HCNC

## 2025-01-24 PROCEDURE — 93005 ELECTROCARDIOGRAM TRACING: CPT

## 2025-01-24 PROCEDURE — 99223 1ST HOSP IP/OBS HIGH 75: CPT | Mod: 25,HCNC,, | Performed by: INTERNAL MEDICINE

## 2025-01-24 PROCEDURE — 85025 COMPLETE CBC W/AUTO DIFF WBC: CPT | Mod: 91,HCNC | Performed by: HOSPITALIST

## 2025-01-24 PROCEDURE — A9540 TC99M MAA: HCPCS | Mod: HCNC | Performed by: HOSPITALIST

## 2025-01-24 PROCEDURE — 85730 THROMBOPLASTIN TIME PARTIAL: CPT | Mod: HCNC | Performed by: HOSPITALIST

## 2025-01-24 RX ORDER — LANOLIN ALCOHOL/MO/W.PET/CERES
1 CREAM (GRAM) TOPICAL EVERY OTHER DAY
Status: DISCONTINUED | OUTPATIENT
Start: 2025-01-24 | End: 2025-01-30 | Stop reason: HOSPADM

## 2025-01-24 RX ORDER — ACETAMINOPHEN 500 MG
1000 TABLET ORAL EVERY 8 HOURS PRN
Status: DISCONTINUED | OUTPATIENT
Start: 2025-01-24 | End: 2025-01-30 | Stop reason: HOSPADM

## 2025-01-24 RX ORDER — OXYCODONE HYDROCHLORIDE 5 MG/1
5 TABLET ORAL EVERY 4 HOURS PRN
Status: DISCONTINUED | OUTPATIENT
Start: 2025-01-24 | End: 2025-01-30 | Stop reason: HOSPADM

## 2025-01-24 RX ORDER — NALOXONE HCL 0.4 MG/ML
0.02 VIAL (ML) INJECTION
Status: DISCONTINUED | OUTPATIENT
Start: 2025-01-24 | End: 2025-01-30 | Stop reason: HOSPADM

## 2025-01-24 RX ORDER — MORPHINE SULFATE 2 MG/ML
2 INJECTION, SOLUTION INTRAMUSCULAR; INTRAVENOUS EVERY 4 HOURS PRN
Status: DISCONTINUED | OUTPATIENT
Start: 2025-01-24 | End: 2025-01-30 | Stop reason: HOSPADM

## 2025-01-24 RX ORDER — PRAVASTATIN SODIUM 20 MG/1
40 TABLET ORAL DAILY
Status: DISCONTINUED | OUTPATIENT
Start: 2025-01-24 | End: 2025-01-30 | Stop reason: HOSPADM

## 2025-01-24 RX ORDER — BENZONATATE 100 MG/1
100 CAPSULE ORAL 3 TIMES DAILY PRN
Status: DISCONTINUED | OUTPATIENT
Start: 2025-01-24 | End: 2025-01-30 | Stop reason: HOSPADM

## 2025-01-24 RX ORDER — ACETAMINOPHEN 325 MG/1
650 TABLET ORAL EVERY 4 HOURS PRN
Status: DISCONTINUED | OUTPATIENT
Start: 2025-01-24 | End: 2025-01-24

## 2025-01-24 RX ORDER — FAMOTIDINE 20 MG/1
20 TABLET, FILM COATED ORAL DAILY
Status: DISCONTINUED | OUTPATIENT
Start: 2025-01-24 | End: 2025-01-27

## 2025-01-24 RX ORDER — SODIUM BICARBONATE 650 MG/1
1300 TABLET ORAL 2 TIMES DAILY
Status: DISCONTINUED | OUTPATIENT
Start: 2025-01-24 | End: 2025-01-30

## 2025-01-24 RX ORDER — AMIODARONE HYDROCHLORIDE 200 MG/1
200 TABLET ORAL 2 TIMES DAILY
Status: DISCONTINUED | OUTPATIENT
Start: 2025-01-24 | End: 2025-01-30 | Stop reason: HOSPADM

## 2025-01-24 RX ORDER — IBUPROFEN 200 MG
16 TABLET ORAL
Status: DISCONTINUED | OUTPATIENT
Start: 2025-01-24 | End: 2025-01-30 | Stop reason: HOSPADM

## 2025-01-24 RX ORDER — METOPROLOL SUCCINATE 25 MG/1
25 TABLET, EXTENDED RELEASE ORAL DAILY
Status: DISCONTINUED | OUTPATIENT
Start: 2025-01-24 | End: 2025-01-30 | Stop reason: HOSPADM

## 2025-01-24 RX ORDER — AMMONIUM LACTATE 12 G/100G
LOTION TOPICAL 2 TIMES DAILY PRN
Status: DISCONTINUED | OUTPATIENT
Start: 2025-01-24 | End: 2025-01-30 | Stop reason: HOSPADM

## 2025-01-24 RX ORDER — MIDODRINE HYDROCHLORIDE 5 MG/1
10 TABLET ORAL
Status: DISCONTINUED | OUTPATIENT
Start: 2025-01-24 | End: 2025-01-24

## 2025-01-24 RX ORDER — FUROSEMIDE 20 MG/1
20 TABLET ORAL DAILY
Status: DISCONTINUED | OUTPATIENT
Start: 2025-01-24 | End: 2025-01-24

## 2025-01-24 RX ORDER — ONDANSETRON HYDROCHLORIDE 2 MG/ML
4 INJECTION, SOLUTION INTRAVENOUS EVERY 8 HOURS PRN
Status: DISCONTINUED | OUTPATIENT
Start: 2025-01-24 | End: 2025-01-30 | Stop reason: HOSPADM

## 2025-01-24 RX ORDER — MORPHINE SULFATE 2 MG/ML
2 INJECTION, SOLUTION INTRAMUSCULAR; INTRAVENOUS ONCE
Status: COMPLETED | OUTPATIENT
Start: 2025-01-24 | End: 2025-01-24

## 2025-01-24 RX ORDER — GLUCAGON 1 MG
1 KIT INJECTION
Status: DISCONTINUED | OUTPATIENT
Start: 2025-01-24 | End: 2025-01-30 | Stop reason: HOSPADM

## 2025-01-24 RX ORDER — HEPARIN SODIUM,PORCINE/D5W 25000/250
0-40 INTRAVENOUS SOLUTION INTRAVENOUS CONTINUOUS
Status: DISCONTINUED | OUTPATIENT
Start: 2025-01-24 | End: 2025-01-26

## 2025-01-24 RX ORDER — TAMSULOSIN HYDROCHLORIDE 0.4 MG/1
0.4 CAPSULE ORAL DAILY
Status: DISCONTINUED | OUTPATIENT
Start: 2025-01-24 | End: 2025-01-30 | Stop reason: HOSPADM

## 2025-01-24 RX ORDER — INSULIN ASPART 100 [IU]/ML
0-5 INJECTION, SOLUTION INTRAVENOUS; SUBCUTANEOUS
Status: DISCONTINUED | OUTPATIENT
Start: 2025-01-24 | End: 2025-01-30 | Stop reason: HOSPADM

## 2025-01-24 RX ORDER — FUROSEMIDE 10 MG/ML
40 INJECTION INTRAMUSCULAR; INTRAVENOUS DAILY
Status: DISCONTINUED | OUTPATIENT
Start: 2025-01-24 | End: 2025-01-28

## 2025-01-24 RX ORDER — SODIUM CHLORIDE 0.9 % (FLUSH) 0.9 %
10 SYRINGE (ML) INJECTION EVERY 8 HOURS PRN
Status: DISCONTINUED | OUTPATIENT
Start: 2025-01-24 | End: 2025-01-30 | Stop reason: HOSPADM

## 2025-01-24 RX ORDER — HYDRALAZINE HYDROCHLORIDE 10 MG/1
10 TABLET, FILM COATED ORAL EVERY 8 HOURS
Status: DISCONTINUED | OUTPATIENT
Start: 2025-01-24 | End: 2025-01-24

## 2025-01-24 RX ORDER — POTASSIUM CHLORIDE 20 MEQ/1
20 TABLET, EXTENDED RELEASE ORAL DAILY
Status: DISCONTINUED | OUTPATIENT
Start: 2025-01-24 | End: 2025-01-28

## 2025-01-24 RX ORDER — ALLOPURINOL 300 MG/1
300 TABLET ORAL DAILY
Status: DISCONTINUED | OUTPATIENT
Start: 2025-01-24 | End: 2025-01-30 | Stop reason: HOSPADM

## 2025-01-24 RX ORDER — IBUPROFEN 200 MG
24 TABLET ORAL
Status: DISCONTINUED | OUTPATIENT
Start: 2025-01-24 | End: 2025-01-30 | Stop reason: HOSPADM

## 2025-01-24 RX ADMIN — TAMSULOSIN HYDROCHLORIDE 0.4 MG: 0.4 CAPSULE ORAL at 09:01

## 2025-01-24 RX ADMIN — MORPHINE SULFATE 2 MG: 2 INJECTION, SOLUTION INTRAMUSCULAR; INTRAVENOUS at 07:01

## 2025-01-24 RX ADMIN — MORPHINE SULFATE 2 MG: 2 INJECTION, SOLUTION INTRAMUSCULAR; INTRAVENOUS at 03:01

## 2025-01-24 RX ADMIN — METOPROLOL SUCCINATE 25 MG: 25 TABLET, EXTENDED RELEASE ORAL at 09:01

## 2025-01-24 RX ADMIN — KIT FOR THE PREPARATION OF TECHNETIUM TC 99M PENTETATE 30.7 MILLICURIE: 20 INJECTION, POWDER, LYOPHILIZED, FOR SOLUTION INTRAVENOUS; RESPIRATORY (INHALATION) at 11:01

## 2025-01-24 RX ADMIN — HEPARIN SODIUM 18 UNITS/KG/HR: 10000 INJECTION, SOLUTION INTRAVENOUS at 09:01

## 2025-01-24 RX ADMIN — FAMOTIDINE 20 MG: 20 TABLET ORAL at 09:01

## 2025-01-24 RX ADMIN — AMIODARONE HYDROCHLORIDE 200 MG: 200 TABLET ORAL at 09:01

## 2025-01-24 RX ADMIN — SODIUM BICARBONATE 1300 MG: 650 TABLET ORAL at 09:01

## 2025-01-24 RX ADMIN — KIT FOR THE PREPARATION OF TECHNETIUM TC 99M ALBUMIN AGGREGATED 4.9 MILLICURIE: 2 INJECTION, POWDER, LYOPHILIZED, FOR SUSPENSION INTRAPERITONEAL; INTRAVENOUS at 12:01

## 2025-01-24 RX ADMIN — FERROUS SULFATE TAB 325 MG (65 MG ELEMENTAL FE) 1 EACH: 325 (65 FE) TAB at 09:01

## 2025-01-24 RX ADMIN — PRAVASTATIN SODIUM 40 MG: 20 TABLET ORAL at 09:01

## 2025-01-24 RX ADMIN — POTASSIUM CHLORIDE 20 MEQ: 1500 TABLET, EXTENDED RELEASE ORAL at 09:01

## 2025-01-24 RX ADMIN — FUROSEMIDE 40 MG: 10 INJECTION, SOLUTION INTRAMUSCULAR; INTRAVENOUS at 09:01

## 2025-01-24 RX ADMIN — AMIODARONE HYDROCHLORIDE 200 MG: 200 TABLET ORAL at 08:01

## 2025-01-24 RX ADMIN — BENZONATATE 100 MG: 100 CAPSULE ORAL at 03:01

## 2025-01-24 RX ADMIN — SODIUM BICARBONATE 1300 MG: 650 TABLET ORAL at 08:01

## 2025-01-24 RX ADMIN — BENZONATATE 100 MG: 100 CAPSULE ORAL at 08:01

## 2025-01-24 RX ADMIN — ALLOPURINOL 300 MG: 300 TABLET ORAL at 09:01

## 2025-01-24 NOTE — H&P
[address] Medicine  History & Physical    Patient Name: Jarrett Charlton Jr.  MRN: [id number]  Patient Class: IP- Inpatient  Admission Date: 1/24/2025  Attending Physician: Darrell Cheema MD   Primary Care Provider: Tripp Mohan MD         Patient information was obtained from patient, past medical records, and ER records.     Subjective:     Principal Problem:Acute chest pain    Chief Complaint:   Chief Complaint   Patient presents with    Chest Pain        HPI: The patient is a 74 year old male with a past medical history of CAD status post PCI to mid LAD in 2017, Hx of VT, Atrial tachycardia s/p AT ablation in 4/2024 and DVT on Eliquis, CHF with the EF of 35%, SBO, s/p colostomy, hypertension, hyperlipidemia, and DM2 who presented to the ED at [facility] with reports of cough, congestion and bilateral chest pain.  His chest pain increased with coughing and moving his body as well as taking a deep breath over the last few days. Patient states that the chest started on left side of his chest and went to the right side. He states it feels like an aching. He states that his easy to breathe through his mouth, but he feels like he cannot breathe through his nose. He feels that the swelling in his legs has worsened.  While in the ED, it was discovered that he has a DVT to the left lower extremity and decision was made to transfer to [facility] for possible IVC filter placement.    On arrival to [facility], the patient had severe chest pain radiating into the center of the chest.  Reports worsened shortness of breath.  He will be admitted to [facility] for further management of his acute chest pain and Cardiology consult.    Past Medical History:   Diagnosis Date    Anticoagulant long-term use     Basal cell carcinoma     BPH (benign prostatic hyperplasia)     s/p TURP    Carotid stenosis     Chronic kidney disease     Claudication     DDD (degenerative disc disease)  10/21/2013    Diabetes mellitus with renal complications     Disc disease, degenerative, cervical     DVT (deep venous thrombosis)     Encounter for blood transfusion     GERD (gastroesophageal reflux disease)     Gout, chronic     History of ulcerative colitis     s/p colectomy and ileostomy    HLD (hyperlipidemia)     HTN (hypertension)     Ileostomy in place 1982    Paroxysmal atrial fibrillation     RBBB     Squamous cell carcinoma 03/08/2018    Left superior helix near insertion    Squamous cell carcinoma 04/12/2018    Left forearm x 5    Syncope 07/06/2024    Ventricular tachycardia        Past Surgical History:   Procedure Laterality Date    ABLATION, SVT, ACCESSORY PATHWAY N/A 4/30/2024    Procedure: Ablation, SVT, Accessory Pathway;  Surgeon: Franky Taylor MD;  Location: [facility] EP LAB;  Service: Cardiology;  Laterality: N/A;  VT, RFA, Carto, MAC, GP, 3 Prep *MDT ILR in situ*    cardiac stents      CATARACT EXTRACTION Bilateral     CERVICAL FUSION      CHOLECYSTECTOMY N/A 2/14/2023    Procedure: CHOLECYSTECTOMY;  Surgeon: Mike Joyce MD;  Location: [facility] OR;  Service: General;  Laterality: N/A;  very high probabilty of converison to open    colectomy and ileostomy  1985    ENDOSCOPIC ULTRASOUND OF UPPER GASTROINTESTINAL TRACT N/A 2/10/2023    Procedure: ULTRASOUND, UPPER GI TRACT, ENDOSCOPIC;  Surgeon: Poli Fuller MD;  Location: [facility] ENDO;  Service: Endoscopy;  Laterality: N/A;    ERCP N/A 2/10/2023    Procedure: ERCP (ENDOSCOPIC RETROGRADE CHOLANGIOPANCREATOGRAPHY);  Surgeon: Poli Fuller MD;  Location: [facility] ENDO;  Service: Endoscopy;  Laterality: N/A;    EXCISION OF PAROTID GLAND Left 12/18/2020    Procedure: EXCISION, PAROTID GLAND;  Surgeon: Michael Pinzon MD;  Location: [address] FLR;  Service: ENT;  Laterality: Left;    INSERTION OF IMPLANTABLE LOOP RECORDER  06/07/2021    INSERTION OF IMPLANTABLE LOOP RECORDER Left 6/7/2021    Procedure: INSERTION, IMPLANTABLE LOOP RECORDER;   Surgeon: Romario Dao MD;  Location: [facility] CATH LAB;  Service: Cardiology;  Laterality: Left;    LEFT HEART CATHETERIZATION Right 4/15/2021    Procedure: CATHETERIZATION, HEART, LEFT;  Surgeon: Marcio Jones MD;  Location: [facility] CATH LAB;  Service: Cardiology;  Laterality: Right;    LYSIS OF ADHESIONS N/A 11/9/2020    Procedure: LYSIS, ADHESIONS,  ERAS low;  Surgeon: CED Haley MD;  Location: [facility] OR 2ND FLR;  Service: Colon and Rectal;  Laterality: N/A;    LYSIS OF ADHESIONS N/A 2/14/2023    Procedure: LYSIS, ADHESIONS;  Surgeon: Mike Joyce MD;  Location: [facility] OR;  Service: General;  Laterality: N/A;    POUCHOSCOPY N/A 4/6/2022    Procedure: ENDOSCOPY, POUCH, SMALL INTESTINE, DIAGNOSTIC;  Surgeon: Dieter Juarez MD;  Location: [facility] ENDO (2ND FLR);  Service: Endoscopy;  Laterality: N/A;    REPAIR, HERNIA, PARASTOMAL N/A 11/9/2020    Procedure: REPAIR, HERNIA, PARASTOMAL;  Surgeon: CED Haley MD;  Location: [facility] OR 2ND FLR;  Service: Colon and Rectal;  Laterality: N/A;    TRANSURETHRAL RESECTION OF PROSTATE (TURP) WITHOUT USE OF LASER N/A 1/23/2019    Procedure: TURP, WITHOUT USING LASER BIPOLAR;  Surgeon: Catarino Mota MD;  Location: [facility] OR 1ST FLR;  Service: Urology;  Laterality: N/A;  1.5 HOURS    TREATMENT OF CARDIAC ARRHYTHMIA  4/30/2024    Procedure: Cardioversion or Defibrillation;  Surgeon: Franky Taylor MD;  Location: [facility] EP LAB;  Service: Cardiology;;       Review of patient's allergies indicates:   Allergen Reactions    Atorvastatin     Rosuvastatin        Current Facility-Administered Medications on File Prior to Encounter   Medication    [COMPLETED] acetaminophen tablet 1,000 mg    [COMPLETED] apixaban tablet 5 mg    [COMPLETED] benzonatate capsule 100 mg    [COMPLETED] cefTRIAXone injection 1 g    [COMPLETED] furosemide injection 20 mg    [COMPLETED] methocarbamoL tablet 500 mg    [COMPLETED] midodrine tablet 10 mg    sodium chloride 0.9% in 1,000 mL infusion      Current Outpatient Medications on File Prior to Encounter   Medication Sig    ACCU-CHEK PAUL CONTROL SOLN Soln 1 drop by NOT APPLICABLE route once daily.    ACCU-CHEK SOFTCLIX LANCETS [facility] TEST BLOOD SUGAR THREE TIMES DAILY    alcohol swabs (DROPSAFE ALCOHOL PREP PADS) PadM Apply 1 each topically once daily.    allopurinoL (ZYLOPRIM) 100 MG tablet Take 4 tablets (400 mg total) by mouth once daily.    amiodarone (PACERONE) 200 MG Tab Take 1 tablet (200 mg total) by mouth 2 (two) times daily.    apixaban (ELIQUIS) 5 mg Tab Take 1 tablet (5 mg total) by mouth 2 (two) times daily.    aspirin (ECOTRIN) 81 MG EC tablet Take 1 tablet (81 mg total) by mouth once daily.    blood sugar diagnostic (ACCU-CHEK GUIDE TEST STRIPS) Strp 1 each by Other route 3 (three) times daily. Test Blood Sugar    blood-glucose meter (ACCU-CHEK GUIDE GLUCOSE METER) [facility] Accucheck BID    brimonidine 0.2% (ALPHAGAN) 0.2 % Drop Place 1 drop into both eyes 2 (two) times a day.    colchicine (COLCRYS) 0.6 mg tablet Take 1 tablet (0.6 mg total) by mouth every other day.    famotidine (PEPCID) 20 MG tablet Take 1 tablet (20 mg total) by mouth once daily.    ferrous sulfate (IRON) 325 mg (65 mg iron) Tab tablet Take 1 tablet (325 mg total) by mouth every other day.    furosemide (LASIX) 40 MG tablet Take 1 tablet (40 mg total) by mouth 2 (two) times daily.    glimepiride (AMARYL) 4 MG tablet TAKE 1 TABLET TWICE DAILY BEFORE MEALS    hydrALAZINE (APRESOLINE) 10 MG tablet Take by mouth.    HYDROcodone-acetaminophen (NORCO) 5-325 mg per tablet Take 1 tablet by mouth every 12 (twelve) hours as needed for Pain. (Patient not taking: Reported on 1/7/2025)    LIDOcaine (LIDODERM) 5 % Place 1 patch onto the skin once daily. Remove & Discard patch within 12 hours or as directed by MD (Patient not taking: Reported on 1/7/2025)    magnesium oxide (MAG-OX) 400 mg (241.3 mg magnesium) tablet Take 400 mg by mouth once daily.    metoprolol succinate (TOPROL-XL) 25  MG 24 hr tablet Take 1 tablet (25 mg total) by mouth once daily.    midodrine (PROAMATINE) 10 MG tablet Take 1 tablet (10 mg total) by mouth 3 (three) times daily with meals.    ondansetron (ZOFRAN) 4 MG tablet Take 1 tablet (4 mg total) by mouth every 8 (eight) hours as needed.    pantoprazole (PROTONIX) 40 MG tablet TAKE 1 TABLET ONE TIME DAILY    pravastatin (PRAVACHOL) 40 MG tablet Take 1 tablet (40 mg total) by mouth once daily.    sodium bicarbonate 650 MG tablet Take 2 tablets (1,300 mg total) by mouth 2 (two) times daily.    tamsulosin (FLOMAX) 0.4 mg Cap Take 1 capsule (0.4 mg total) by mouth once daily.    temazepam (RESTORIL) 15 mg Cap Take 1 capsule (15 mg total) by mouth nightly as needed.    traMADoL (ULTRAM) 50 mg tablet Take 1 tablet (50 mg total) by mouth every 8 (eight) hours as needed for Pain.     Family History       Problem Relation (Age of Onset)    Cancer Father    Dementia Mother    Diabetes Father    Heart disease Father          Tobacco Use    Smoking status: Never     Passive exposure: Never    Smokeless tobacco: Never   Substance and Sexual Activity    Alcohol use: No    Drug use: No    Sexual activity: Not Currently     Partners: Female     Review of Systems   Constitutional:  Positive for activity change and fatigue. Negative for appetite change and fever.   HENT:  Negative for congestion, ear pain and postnasal drip.    Eyes:  Negative for discharge.   Respiratory:  Positive for chest tightness and shortness of breath. Negative for apnea and wheezing.    Cardiovascular:  Positive for chest pain and leg swelling.   Gastrointestinal:  Negative for abdominal distention, abdominal pain, nausea and vomiting.   Endocrine: Negative for polydipsia, polyphagia and polyuria.   Genitourinary:  Negative for difficulty urinating, flank pain, frequency, hematuria and urgency.   Musculoskeletal:  Negative for arthralgias and joint swelling.   Skin:  Negative for pallor and rash.    Allergic/Immunologic: Negative for environmental allergies and food allergies.   Neurological:  Negative for dizziness, speech difficulty, weakness, light-headedness and headaches.   Hematological:  Does not bruise/bleed easily.   Psychiatric/Behavioral:  Negative for agitation.      Objective:     Vital Signs (Most Recent):  Temp: 97.5 °F (36.4 °C) (25)  Pulse: 80 (25 020)  Resp: 20 (25)  BP: 107/69 (25)  SpO2: 98 % (25) Vital Signs (24h Range):  Temp:  [97.5 °F (36.4 °C)-98 °F (36.7 °C)] 97.5 °F (36.4 °C)  Pulse:  [69-80] 80  Resp:  [18-20] 20  SpO2:  [97 %-99 %] 98 %  BP: ()/(58-72) 107/69     Weight: 73.3 kg (161 lb 9.6 oz)  Body mass index is 20.75 kg/m².     Physical Exam  Constitutional:       General: He is in acute distress.      Appearance: He is well-developed.   HENT:      Head: Normocephalic.   Eyes:      Conjunctiva/sclera: Conjunctivae normal.   Cardiovascular:      Rate and Rhythm: Normal rate and regular rhythm.      Pulses:           Radial pulses are 2+ on the right side and 2+ on the left side.      Heart sounds: Normal heart sounds.   Pulmonary:      Effort: Pulmonary effort is normal.      Breath sounds: Normal breath sounds.   Abdominal:      General: Bowel sounds are normal. There is no distension.      Palpations: Abdomen is soft.      Tenderness: There is no abdominal tenderness.   Musculoskeletal:         General: Normal range of motion.      Cervical back: Normal range of motion and neck supple.      Right lower le+ Pitting Edema present.      Left lower le+ Pitting Edema present.   Skin:     General: Skin is warm and dry.   Neurological:      Mental Status: He is alert and oriented to person, place, and time.      GCS: GCS eye subscore is 4. GCS verbal subscore is 5. GCS motor subscore is 6.      Motor: Motor function is intact.   Psychiatric:         Mood and Affect: Mood is anxious.         Speech: Speech is rapid and  pressured.         Behavior: Behavior is agitated.                Significant Labs: All pertinent labs within the past 24 hours have been reviewed.  CBC:   Recent Labs   Lab 01/23/25 1923   WBC 9.52   HGB 11.9*   HCT 39.7*   *     CMP:   Recent Labs   Lab 01/23/25 1923      K 3.4*      CO2 23   *   BUN 56*   CREATININE 2.6*   CALCIUM 8.9   PROT 6.7   ALBUMIN 3.2*   BILITOT 1.3*   ALKPHOS 144   AST 17   ALT 18   ANIONGAP 15       Significant Imaging: I have reviewed all pertinent imaging results/findings within the past 24 hours.  Assessment/Plan:     * Acute chest pain  Patient with severe 10/10 chest pain on arrival.  States he has been having chest pain since presentation like this.  SOB, however O2 sats WNL.  PE?    Stat troponin, EKG pending, morphine, oxygen  Consult Cardiology  Echo        Acute deep vein thrombosis (DVT) of left lower extremity  US- Nonocclusive DVT within the left greater saphenous vein.  No evidence of DVT within the right lower extremity.    Continue apixaban  Consider IVC filter, Cardiology consult      CKD stage 4 due to type 2 diabetes mellitus  Creatine stable for now. BMP reviewed- noted Estimated Creatinine Clearance: 25.5 mL/min (A) (based on SCr of 2.6 mg/dL (H)). according to latest data. Based on current GFR, CKD stage is stage 4 - GFR 15-29.  Monitor UOP and serial BMP and adjust therapy as needed. Renally dose meds. Avoid nephrotoxic medications and procedures.    Type 2 diabetes mellitus with diabetic polyneuropathy, without long-term current use of insulin  BG- 136    Low dose SSI  BG AC/HS  Diabetic diet      HLD (hyperlipidemia)  Continue pravastatin      Essential hypertension  Patient's blood pressure range in the last 24 hours was: BP  Min: 95/58  Max: 114/70.The patient's inpatient anti-hypertensive regimen is listed below:  Current Antihypertensives  furosemide tablet 20 mg, Daily, Oral  hydrALAZINE tablet 10 mg, Every 8 hours,  Oral  metoprolol succinate (TOPROL-XL) 24 hr tablet 25 mg, Daily, Oral    Plan  - BP is controlled, no changes needed to their regimen        VTE Risk Mitigation (From admission, onward)           Ordered     apixaban tablet 5 mg  2 times daily         01/24/25 0305     Reason for No Pharmacological VTE Prophylaxis  Once        Question:  Reasons:  Answer:  Already adequately anticoagulated on oral Anticoagulants    01/24/25 0305     IP VTE HIGH RISK PATIENT  Once         01/24/25 0305     Place sequential compression device  Until discontinued         01/24/25 0305                                    Abhi Canela NP  Department of Hospital Medicine  [facility] - Med Surg ([address])

## 2025-01-24 NOTE — PROGRESS NOTES
[facility] - Med Surg ([address])  Adult Nutrition  Progress Note    SUMMARY       Recommendations  1) Add Boost Glucose Control to every meal    2) Continue current diet and encourage intake of meals    3) Honor pts food preferences    4) RD to monitor and follow up    Goals: Pt will tolerate >75% EEN by RD follow up  Nutrition Goal Status: new  Communication of RD Recs:  (POC)    Assessment and Plan    Endocrine  Moderate malnutrition  Malnutrition Type:  Context: chronic illness  Level: moderate    Related to (etiology):   Inadequate energy intake    Signs and Symptoms (as evidenced by):   Weight loss >20% in 1 year; reported poor appetite     Malnutrition Characteristic Summary:  Weight Loss (Malnutrition): greater than 20% in 1 year  Energy Intake (Malnutrition): less than 75% for greater than or equal to 3 months      Interventions (treatment strategy):  Commercial beverages  Collaboration of care with other providers  Mineral/CHO/fat modified diet    Nutrition Diagnosis Status:   New         Malnutrition Assessment  Malnutrition Context: chronic illness  Malnutrition Level: moderate          Weight Loss (Malnutrition): greater than 20% in 1 year  Energy Intake (Malnutrition): less than 75% for greater than or equal to 3 months                         Reason for Assessment    Reason For Assessment: identified at risk by screening criteria  Diagnosis:  (Acute chest pain)  General Information Comments: Pt admitted for acute chest pain. Significant hx of HTN, CHF, DMT2, SVT, CKD IV. Identified at risk for malnutrition by screening tool. Pt reported weight loss: noted >20% within the year. Patient reports poor oral intake since his bowel obstruction. Malnutrition identified. Per previous RD note, pt prefers vanilla Boost Glucose Control. Consider adding to diet order for weight maintanence. Ileostomy present. YVONNE. Sanju 19-R/L heel.  Nutrition Discharge Planning: dc on heart healthy diet    Nutrition Related  "Social Determinants of Health: SDOH: Adequate food in home environment    Nutrition/Diet History    Food Allergies: NKFA  Factors Affecting Nutritional Intake: decreased appetite    Anthropometrics    Height: 6' 2" (188 cm)  Height (inches): 74 in  Height Method: Stated  Weight: 73 kg (160 lb 15 oz)  Weight (lb): 160.94 lb  Weight Method: Bed Scale  Ideal Body Weight (IBW), Male: 190 lb  % Ideal Body Weight, Male (lb): 84.71 %  BMI (Calculated): 20.7  BMI Grade: less than 23 (older than 65 years) - underweight  Weight Loss: unintentional  Usual Body Weight (UBW), k.45 kg  % Usual Body Weight: 76.64  % Weight Change From Usual Weight: -23.52 %       Lab/Procedures/Meds    Pertinent Labs Reviewed: reviewed  CBC:  Recent Labs   Lab 25  0836   WBC 6.39   HGB 11.1*   HCT 37.4*   *     CMP:  Recent Labs   Lab 25  0427   CALCIUM 9.2   ALBUMIN 3.1*   PROT 6.6      K 3.9   CO2 24      BUN 54*   CREATININE 2.6*   ALKPHOS 138   ALT 18   AST 13   BILITOT 1.1*     BMP:   Recent Labs   Lab 25  0427   *      K 3.9      CO2 24   BUN 54*   CREATININE 2.6*   CALCIUM 9.2   MG 1.9       Pertinent Medications Reviewed: reviewed  Scheduled Meds:   allopurinoL  300 mg Oral Daily    amiodarone  200 mg Oral BID    famotidine  20 mg Oral Daily    ferrous sulfate  1 tablet Oral Every other day    furosemide (LASIX) injection  40 mg Intravenous Daily    heparin (PORCINE)  80 Units/kg Intravenous Once    metoprolol succinate  25 mg Oral Daily    potassium chloride  20 mEq Oral Daily    pravastatin  40 mg Oral Daily    sodium bicarbonate  1,300 mg Oral BID    tamsulosin  0.4 mg Oral Daily     Continuous Infusions:   heparin (porcine) in D5W  0-40 Units/kg/hr Intravenous Continuous 13.2 mL/hr at 2555 18 Units/kg/hr at 25 09     PRN Meds:.  Current Facility-Administered Medications:     acetaminophen, 1,000 mg, Oral, Q8H PRN    ammonium lactate, , Topical (Top), BID " PRN    benzonatate, 100 mg, Oral, TID PRN    dextrose 50%, 12.5 g, Intravenous, PRN    dextrose 50%, 25 g, Intravenous, PRN    glucagon (human recombinant), 1 mg, Intramuscular, PRN    glucose, 16 g, Oral, PRN    glucose, 24 g, Oral, PRN    heparin (PORCINE), 60 Units/kg, Intravenous, PRN    heparin (PORCINE), 30 Units/kg, Intravenous, PRN    insulin aspart U-100, 0-5 Units, Subcutaneous, QID (AC + HS) PRN    naloxone, 0.02 mg, Intravenous, PRN    ondansetron, 4 mg, Intravenous, Q8H PRN    sodium chloride 0.9%, 10 mL, Intravenous, Q8H PRN    Estimated/Assessed Needs    Weight Used For Calorie Calculations: 73 kg (160 lb 15 oz)  Energy Calorie Requirements (kcal): 1825  Energy Need Method: Kcal/kg (25)  Protein Requirements: 80g (1.1 g/kg)  Weight Used For Protein Calculations: 73 kg (160 lb 15 oz)  Fluid Requirements (mL): 1 mL/kcal  Estimated Fluid Requirement Method: RDA Method  RDA Method (mL): 1825  CHO Requirement: 225g      Nutrition Prescription Ordered    Current Diet Order: diabetic, cardiac diet    Evaluation of Received Nutrient/Fluid Intake    I/O: -65 mL since admit  Energy Calories Required: not meeting needs (predicted)  Protein Required: not meeting needs (predicted)  Comments: LBM: 1/24  Tolerance:  (monitoring)  % Intake of Estimated Energy Needs: 25 - 50 %  % Meal Intake: 25 - 50 %    Nutrition Risk    Level of Risk/Frequency of Follow-up: moderate     Monitor and Evaluation    Food and Nutrient Intake: energy intake, food and beverage intake  Food and Nutrient Adminstration: diet order  Physical Activity and Function: nutrition-related ADLs and IADLs  Anthropometric Measurements: weight, weight change, body mass index  Biochemical Data, Medical Tests and Procedures: electrolyte and renal panel, gastrointestinal profile, glucose/endocrine profile, inflammatory profile, lipid profile     Nutrition Follow-Up    RD Follow-up?: Yes    Iris Glover RDN, [facility]

## 2025-01-24 NOTE — SUBJECTIVE & OBJECTIVE
Past Medical History:   Diagnosis Date    Anticoagulant long-term use     Basal cell carcinoma     BPH (benign prostatic hyperplasia)     s/p TURP    Carotid stenosis     Chronic kidney disease     Claudication     DDD (degenerative disc disease) 10/21/2013    Diabetes mellitus with renal complications     Disc disease, degenerative, cervical     DVT (deep venous thrombosis)     Encounter for blood transfusion     GERD (gastroesophageal reflux disease)     Gout, chronic     History of ulcerative colitis     s/p colectomy and ileostomy    HLD (hyperlipidemia)     HTN (hypertension)     Ileostomy in place 1982    Paroxysmal atrial fibrillation     RBBB     Squamous cell carcinoma 03/08/2018    Left superior helix near insertion    Squamous cell carcinoma 04/12/2018    Left forearm x 5    Syncope 07/06/2024    Ventricular tachycardia        Past Surgical History:   Procedure Laterality Date    ABLATION, SVT, ACCESSORY PATHWAY N/A 4/30/2024    Procedure: Ablation, SVT, Accessory Pathway;  Surgeon: Franky Taylor MD;  Location: [facility] EP LAB;  Service: Cardiology;  Laterality: N/A;  VT, RFA, Carto, MAC, GP, 3 Prep *MDT ILR in situ*    cardiac stents      CATARACT EXTRACTION Bilateral     CERVICAL FUSION      CHOLECYSTECTOMY N/A 2/14/2023    Procedure: CHOLECYSTECTOMY;  Surgeon: Mike Joyce MD;  Location: [facility] OR;  Service: General;  Laterality: N/A;  very high probabilty of converison to open    colectomy and ileostomy  1985    ENDOSCOPIC ULTRASOUND OF UPPER GASTROINTESTINAL TRACT N/A 2/10/2023    Procedure: ULTRASOUND, UPPER GI TRACT, ENDOSCOPIC;  Surgeon: Poli Fuller MD;  Location: [facility] ENDO;  Service: Endoscopy;  Laterality: N/A;    ERCP N/A 2/10/2023    Procedure: ERCP (ENDOSCOPIC RETROGRADE CHOLANGIOPANCREATOGRAPHY);  Surgeon: Poli Fuller MD;  Location: [facility] ENDO;  Service: Endoscopy;  Laterality: N/A;    EXCISION OF PAROTID GLAND Left 12/18/2020    Procedure: EXCISION, PAROTID GLAND;  Surgeon: Michael  BETTY Pinzon MD;  Location: [facility] OR 2ND FLR;  Service: ENT;  Laterality: Left;    INSERTION OF IMPLANTABLE LOOP RECORDER  06/07/2021    INSERTION OF IMPLANTABLE LOOP RECORDER Left 6/7/2021    Procedure: INSERTION, IMPLANTABLE LOOP RECORDER;  Surgeon: Romario Dao MD;  Location: [facility] CATH LAB;  Service: Cardiology;  Laterality: Left;    LEFT HEART CATHETERIZATION Right 4/15/2021    Procedure: CATHETERIZATION, HEART, LEFT;  Surgeon: Marcio Jones MD;  Location: [facility] CATH LAB;  Service: Cardiology;  Laterality: Right;    LYSIS OF ADHESIONS N/A 11/9/2020    Procedure: LYSIS, ADHESIONS,  ERAS low;  Surgeon: CED Haley MD;  Location: [facility] OR 2ND FLR;  Service: Colon and Rectal;  Laterality: N/A;    LYSIS OF ADHESIONS N/A 2/14/2023    Procedure: LYSIS, ADHESIONS;  Surgeon: Mike Joyce MD;  Location: [facility];  Service: General;  Laterality: N/A;    POUCHOSCOPY N/A 4/6/2022    Procedure: ENDOSCOPY, POUCH, SMALL INTESTINE, DIAGNOSTIC;  Surgeon: Dieter Juarez MD;  Location: [facility] ENDO (2ND FLR);  Service: Endoscopy;  Laterality: N/A;    REPAIR, HERNIA, PARASTOMAL N/A 11/9/2020    Procedure: REPAIR, HERNIA, PARASTOMAL;  Surgeon: CED Haley MD;  Location: [facility] OR 2ND FLR;  Service: Colon and Rectal;  Laterality: N/A;    TRANSURETHRAL RESECTION OF PROSTATE (TURP) WITHOUT USE OF LASER N/A 1/23/2019    Procedure: TURP, WITHOUT USING LASER BIPOLAR;  Surgeon: Catarino Mota MD;  Location: [facility] OR 1ST FLR;  Service: Urology;  Laterality: N/A;  1.5 HOURS    TREATMENT OF CARDIAC ARRHYTHMIA  4/30/2024    Procedure: Cardioversion or Defibrillation;  Surgeon: Franky Taylor MD;  Location: [facility] EP LAB;  Service: Cardiology;;       Review of patient's allergies indicates:   Allergen Reactions    Atorvastatin     Rosuvastatin        Current Facility-Administered Medications on File Prior to Encounter   Medication    [COMPLETED] acetaminophen tablet 1,000 mg    [COMPLETED] apixaban tablet 5 mg    [COMPLETED]  benzonatate capsule 100 mg    [COMPLETED] cefTRIAXone injection 1 g    [COMPLETED] furosemide injection 20 mg    [COMPLETED] methocarbamoL tablet 500 mg    [COMPLETED] midodrine tablet 10 mg    sodium chloride 0.9% in 1,000 mL infusion     Current Outpatient Medications on File Prior to Encounter   Medication Sig    ACCU-CHEK PAUL CONTROL SOLN Soln 1 drop by NOT APPLICABLE route once daily.    ACCU-CHEK SOFTCLIX LANCETS [facility] TEST BLOOD SUGAR THREE TIMES DAILY    alcohol swabs (DROPSAFE ALCOHOL PREP PADS) PadM Apply 1 each topically once daily.    allopurinoL (ZYLOPRIM) 100 MG tablet Take 4 tablets (400 mg total) by mouth once daily.    amiodarone (PACERONE) 200 MG Tab Take 1 tablet (200 mg total) by mouth 2 (two) times daily.    apixaban (ELIQUIS) 5 mg Tab Take 1 tablet (5 mg total) by mouth 2 (two) times daily.    aspirin (ECOTRIN) 81 MG EC tablet Take 1 tablet (81 mg total) by mouth once daily.    blood sugar diagnostic (ACCU-CHEK GUIDE TEST STRIPS) Strp 1 each by Other route 3 (three) times daily. Test Blood Sugar    blood-glucose meter (ACCU-CHEK GUIDE GLUCOSE METER) [facility] Accucheck BID    brimonidine 0.2% (ALPHAGAN) 0.2 % Drop Place 1 drop into both eyes 2 (two) times a day.    colchicine (COLCRYS) 0.6 mg tablet Take 1 tablet (0.6 mg total) by mouth every other day.    famotidine (PEPCID) 20 MG tablet Take 1 tablet (20 mg total) by mouth once daily.    ferrous sulfate (IRON) 325 mg (65 mg iron) Tab tablet Take 1 tablet (325 mg total) by mouth every other day.    furosemide (LASIX) 40 MG tablet Take 1 tablet (40 mg total) by mouth 2 (two) times daily.    glimepiride (AMARYL) 4 MG tablet TAKE 1 TABLET TWICE DAILY BEFORE MEALS    hydrALAZINE (APRESOLINE) 10 MG tablet Take by mouth.    HYDROcodone-acetaminophen (NORCO) 5-325 mg per tablet Take 1 tablet by mouth every 12 (twelve) hours as needed for Pain. (Patient not taking: Reported on 1/7/2025)    LIDOcaine (LIDODERM) 5 % Place 1 patch onto the skin once daily.  Remove & Discard patch within 12 hours or as directed by MD (Patient not taking: Reported on 1/7/2025)    magnesium oxide (MAG-OX) 400 mg (241.3 mg magnesium) tablet Take 400 mg by mouth once daily.    metoprolol succinate (TOPROL-XL) 25 MG 24 hr tablet Take 1 tablet (25 mg total) by mouth once daily.    midodrine (PROAMATINE) 10 MG tablet Take 1 tablet (10 mg total) by mouth 3 (three) times daily with meals.    ondansetron (ZOFRAN) 4 MG tablet Take 1 tablet (4 mg total) by mouth every 8 (eight) hours as needed.    pantoprazole (PROTONIX) 40 MG tablet TAKE 1 TABLET ONE TIME DAILY    pravastatin (PRAVACHOL) 40 MG tablet Take 1 tablet (40 mg total) by mouth once daily.    sodium bicarbonate 650 MG tablet Take 2 tablets (1,300 mg total) by mouth 2 (two) times daily.    tamsulosin (FLOMAX) 0.4 mg Cap Take 1 capsule (0.4 mg total) by mouth once daily.    temazepam (RESTORIL) 15 mg Cap Take 1 capsule (15 mg total) by mouth nightly as needed.    traMADoL (ULTRAM) 50 mg tablet Take 1 tablet (50 mg total) by mouth every 8 (eight) hours as needed for Pain.     Family History       Problem Relation (Age of Onset)    Cancer Father    Dementia Mother    Diabetes Father    Heart disease Father          Tobacco Use    Smoking status: Never     Passive exposure: Never    Smokeless tobacco: Never   Substance and Sexual Activity    Alcohol use: No    Drug use: No    Sexual activity: Not Currently     Partners: Female     Review of Systems   Constitutional:  Positive for activity change and fatigue. Negative for appetite change and fever.   HENT:  Negative for congestion, ear pain and postnasal drip.    Eyes:  Negative for discharge.   Respiratory:  Positive for chest tightness and shortness of breath. Negative for apnea and wheezing.    Cardiovascular:  Positive for chest pain and leg swelling.   Gastrointestinal:  Negative for abdominal distention, abdominal pain, nausea and vomiting.   Endocrine: Negative for polydipsia, polyphagia  and polyuria.   Genitourinary:  Negative for difficulty urinating, flank pain, frequency, hematuria and urgency.   Musculoskeletal:  Negative for arthralgias and joint swelling.   Skin:  Negative for pallor and rash.   Allergic/Immunologic: Negative for environmental allergies and food allergies.   Neurological:  Negative for dizziness, speech difficulty, weakness, light-headedness and headaches.   Hematological:  Does not bruise/bleed easily.   Psychiatric/Behavioral:  Negative for agitation.      Objective:     Vital Signs (Most Recent):  Temp: 97.5 °F (36.4 °C) (25)  Pulse: 80 (25)  Resp: 20 (25)  BP: 107/69 (25)  SpO2: 98 % (25) Vital Signs (24h Range):  Temp:  [97.5 °F (36.4 °C)-98 °F (36.7 °C)] 97.5 °F (36.4 °C)  Pulse:  [69-80] 80  Resp:  [18-20] 20  SpO2:  [97 %-99 %] 98 %  BP: ()/(58-72) 107/69     Weight: 73.3 kg (161 lb 9.6 oz)  Body mass index is 20.75 kg/m².     Physical Exam  Constitutional:       General: He is in acute distress.      Appearance: He is well-developed.   HENT:      Head: Normocephalic.   Eyes:      Conjunctiva/sclera: Conjunctivae normal.   Cardiovascular:      Rate and Rhythm: Normal rate and regular rhythm.      Pulses:           Radial pulses are 2+ on the right side and 2+ on the left side.      Heart sounds: Normal heart sounds.   Pulmonary:      Effort: Pulmonary effort is normal.      Breath sounds: Normal breath sounds.   Abdominal:      General: Bowel sounds are normal. There is no distension.      Palpations: Abdomen is soft.      Tenderness: There is no abdominal tenderness.   Musculoskeletal:         General: Normal range of motion.      Cervical back: Normal range of motion and neck supple.      Right lower le+ Pitting Edema present.      Left lower le+ Pitting Edema present.   Skin:     General: Skin is warm and dry.   Neurological:      Mental Status: He is alert and oriented to person, place, and time.       GCS: GCS eye subscore is 4. GCS verbal subscore is 5. GCS motor subscore is 6.      Motor: Motor function is intact.   Psychiatric:         Mood and Affect: Mood is anxious.         Speech: Speech is rapid and pressured.         Behavior: Behavior is agitated.                Significant Labs: All pertinent labs within the past 24 hours have been reviewed.  CBC:   Recent Labs   Lab 01/23/25 1923   WBC 9.52   HGB 11.9*   HCT 39.7*   *     CMP:   Recent Labs   Lab 01/23/25 1923      K 3.4*      CO2 23   *   BUN 56*   CREATININE 2.6*   CALCIUM 8.9   PROT 6.7   ALBUMIN 3.2*   BILITOT 1.3*   ALKPHOS 144   AST 17   ALT 18   ANIONGAP 15       Significant Imaging: I have reviewed all pertinent imaging results/findings within the past 24 hours.

## 2025-01-24 NOTE — PLAN OF CARE
Case Management Assessment     PCP: Tripp Mohan MD  [id number] [address] [place] [address]     Patient Arrived From: home  Existing Help at Home: Independent    Barriers to Discharge: None    Discharge Plan:    A. Home   B. Home    A&O x 4; Independent with ADL's; Does not use/need DME; PCP correct in chart; family will provide transportation at discharge.       [facility] ([address])  Initial Discharge Assessment       Primary Care Provider: Tripp Mohan MD    Admission Diagnosis: Recurrent deep vein thrombosis (DVT) [I82.409]    Admission Date: 1/24/2025  Expected Discharge Date:     Transition of Care Barriers: (P) None    Payor: [employer] MEDICARE / Plan: [employer] MEDICARE HMO / Product Type: Capitation /     Extended Emergency Contact Information  Primary Emergency Contact: Latisha Charlton  Home Phone: [phone]  Mobile Phone: [phone]  Relation: Daughter    Discharge Plan A: (P) Home  Discharge Plan B: (P) Home      [facility] - [facility] [address]  [address] [facility] [address]  Phone: [phone] Fax: [phone]      Initial Assessment (most recent)       Adult Discharge Assessment - 01/24/25 0857          Discharge Assessment    Assessment Type Discharge Planning Assessment (P)      Confirmed/corrected address, phone number and insurance Yes (P)      Confirmed Demographics Correct on Facesheet (P)      Source of Information patient (P)      Communicated JOSÉ MIGUEL with patient/caregiver Yes (P)      Reason For Admission Acute chest pain (P)      People in Home alone (P)      Facility Arrived From: Home/[place] (P)      Do you expect to return to your current living situation? Yes (P)      Do you have help at home or someone to help you manage your care at home? No (P)      Prior to hospitilization cognitive status: Alert/Oriented (P)      Current cognitive status: Alert/Oriented (P)       Walking or Climbing Stairs Difficulty no (P)      Dressing/Bathing Difficulty no (P)      Equipment Currently Used at Home none (P)      Readmission within 30 days? Yes (P)      Patient currently being followed by outpatient case management? No (P)      Do you currently have service(s) that help you manage your care at home? No (P)      Do you take prescription medications? Yes (P)      Do you have prescription coverage? Yes (P)      Coverage [employer] MANAGED MEDICARE - [employer] MEDICARE O - CAPITATED (P)      Do you have any problems affording any of your prescribed medications? No (P)      Is the patient taking medications as prescribed? yes (P)      Who is going to help you get home at discharge? Family (P)      How do you get to doctors appointments? car, drives self (P)      Are you on dialysis? No (P)      Do you take coumadin? Yes (P)      Who monitors your labs? MD Marques (P)      Discharge Plan A Home (P)      Discharge Plan B Home (P)      DME Needed Upon Discharge  none (P)      Discharge Plan discussed with: Patient (P)      Transition of Care Barriers None (P)

## 2025-01-24 NOTE — HPI
"Per Abhi Canela, NP:    "The patient is a 74 year old male with a past medical history of CAD status post PCI to mid LAD in 2017, Hx of VT, Atrial tachycardia s/p AT ablation in 4/2024 and DVT on Eliquis, CHF with the EF of 35%, SBO, s/p colostomy, hypertension, hyperlipidemia, and DM2 who presented to the ED at [facility] with reports of cough, congestion and bilateral chest pain.  His chest pain increased with coughing and moving his body as well as taking a deep breath over the last few days. Patient states that the chest started on left side of his chest and went to the right side. He states it feels like an aching. He states that his easy to breathe through his mouth, but he feels like he cannot breathe through his nose. He feels that the swelling in his legs has worsened.  While in the ED, it was discovered that he has a DVT to the left lower extremity and decision was made to transfer to [facility] for possible IVC filter placement.    On arrival to [facility], the patient had severe chest pain radiating into the center of the chest.  Reports worsened shortness of breath.  He will be admitted to [facility] for further management of his acute chest pain and Cardiology consult."  "

## 2025-01-24 NOTE — CONSULTS
Inpatient consult to Hematology/Oncology  Consult performed by: Gail Morales MD  Consult ordered by: Darrell Cheema MD  Reason for consult: LLE DVT      Hematology / Oncology   Consult Note    Consult Requested By: Dr Cheema  Reason for Consult: h/o DVT with new LLE DVT    SUBJECTIVE:   Admit date: 1/24/2025  History of Present Illness: Jarrett Charlton Jr. is a 74 y.o. male w/ chest pain who presented to the ER on 1/23/25. Patient has prior diagnosis of LLE extensive DVT in August 2024. He was placed on eliquis at that time and continues on the medication. Attests to adherence.   Repeat LLE ultrasound Sept 2024 showed resolution of DVT in LLE    BLE ultrasound 1/23/25: Impression:   Nonocclusive DVT within the left greater saphenous vein.    NM Lung scan 1/24/25: Low intermediate probability (20-50%) of acute pulmonary embolism.     Review of Systems  Review of Systems   Constitutional: Negative.    HENT: Negative.     Respiratory:  Positive for shortness of breath.    Cardiovascular:  Positive for chest pain.   Gastrointestinal: Negative.    Endocrine: Negative.    Musculoskeletal: Negative.    Allergic/Immunologic: Negative.    Neurological: Negative.    All other systems reviewed and are negative.      Facility-Administered Medications Prior to Admission   Medication    leuprolide acetate (6 month) injection 45 mg    leuprolide acetate (6 month) injection 45 mg     PTA Medications   Medication Sig    ACCU-CHEK PAUL CONTROL SOLN Soln 1 drop by NOT APPLICABLE route once daily.    ACCU-CHEK SOFTCLIX LANCETS Misc TEST BLOOD SUGAR THREE TIMES DAILY    alcohol swabs (DROPSAFE ALCOHOL PREP PADS) PadM Apply 1 each topically once daily.    allopurinoL (ZYLOPRIM) 100 MG tablet Take 4 tablets (400 mg total) by mouth once daily.    amiodarone (PACERONE) 200 MG Tab Take 1 tablet (200 mg total) by mouth 2 (two) times daily.    apixaban (ELIQUIS) 5 mg Tab Take 1 tablet (5 mg total) by mouth 2 (two) times daily.     aspirin (ECOTRIN) 81 MG EC tablet Take 1 tablet (81 mg total) by mouth once daily.    blood sugar diagnostic (ACCU-CHEK GUIDE TEST STRIPS) Strp 1 each by Other route 3 (three) times daily. Test Blood Sugar    blood-glucose meter (ACCU-CHEK GUIDE GLUCOSE METER) [facility] Accucheck BID    brimonidine 0.2% (ALPHAGAN) 0.2 % Drop Place 1 drop into both eyes 2 (two) times a day.    colchicine (COLCRYS) 0.6 mg tablet Take 1 tablet (0.6 mg total) by mouth every other day.    famotidine (PEPCID) 20 MG tablet Take 1 tablet (20 mg total) by mouth once daily.    ferrous sulfate (IRON) 325 mg (65 mg iron) Tab tablet Take 1 tablet (325 mg total) by mouth every other day.    furosemide (LASIX) 40 MG tablet Take 1 tablet (40 mg total) by mouth 2 (two) times daily.    glimepiride (AMARYL) 4 MG tablet TAKE 1 TABLET TWICE DAILY BEFORE MEALS    hydrALAZINE (APRESOLINE) 10 MG tablet Take by mouth.    HYDROcodone-acetaminophen (NORCO) 5-325 mg per tablet Take 1 tablet by mouth every 12 (twelve) hours as needed for Pain. (Patient not taking: Reported on 1/7/2025)    LIDOcaine (LIDODERM) 5 % Place 1 patch onto the skin once daily. Remove & Discard patch within 12 hours or as directed by MD (Patient not taking: Reported on 1/7/2025)    magnesium oxide (MAG-OX) 400 mg (241.3 mg magnesium) tablet Take 400 mg by mouth once daily.    metoprolol succinate (TOPROL-XL) 25 MG 24 hr tablet Take 1 tablet (25 mg total) by mouth once daily.    midodrine (PROAMATINE) 10 MG tablet Take 1 tablet (10 mg total) by mouth 3 (three) times daily with meals.    ondansetron (ZOFRAN) 4 MG tablet Take 1 tablet (4 mg total) by mouth every 8 (eight) hours as needed.    pantoprazole (PROTONIX) 40 MG tablet TAKE 1 TABLET ONE TIME DAILY    pravastatin (PRAVACHOL) 40 MG tablet Take 1 tablet (40 mg total) by mouth once daily.    sodium bicarbonate 650 MG tablet Take 2 tablets (1,300 mg total) by mouth 2 (two) times daily.    tamsulosin (FLOMAX) 0.4 mg Cap Take 1 capsule (0.4  mg total) by mouth once daily.    temazepam (RESTORIL) 15 mg Cap Take 1 capsule (15 mg total) by mouth nightly as needed.    traMADoL (ULTRAM) 50 mg tablet Take 1 tablet (50 mg total) by mouth every 8 (eight) hours as needed for Pain.      Review of patient's allergies indicates:   Allergen Reactions    Atorvastatin     Rosuvastatin        Past Medical History:   Diagnosis Date    Anticoagulant long-term use     Basal cell carcinoma     BPH (benign prostatic hyperplasia)     s/p TURP    Carotid stenosis     Chronic kidney disease     Claudication     DDD (degenerative disc disease) 10/21/2013    Diabetes mellitus with renal complications     Disc disease, degenerative, cervical     DVT (deep venous thrombosis)     Encounter for blood transfusion     GERD (gastroesophageal reflux disease)     Gout, chronic     History of ulcerative colitis     s/p colectomy and ileostomy    HLD (hyperlipidemia)     HTN (hypertension)     Ileostomy in place 1982    Paroxysmal atrial fibrillation     RBBB     Squamous cell carcinoma 03/08/2018    Left superior helix near insertion    Squamous cell carcinoma 04/12/2018    Left forearm x 5    Syncope 07/06/2024    Ventricular tachycardia        Past Surgical History:   Procedure Laterality Date    ABLATION, SVT, ACCESSORY PATHWAY N/A 4/30/2024    Procedure: Ablation, SVT, Accessory Pathway;  Surgeon: Franky Taylor MD;  Location: [facility] EP LAB;  Service: Cardiology;  Laterality: N/A;  VT, RFA, Carto, MAC, GP, 3 Prep *MDT ILR in situ*    cardiac stents      CATARACT EXTRACTION Bilateral     CERVICAL FUSION      CHOLECYSTECTOMY N/A 2/14/2023    Procedure: CHOLECYSTECTOMY;  Surgeon: Mike Joyce MD;  Location: [facility];  Service: General;  Laterality: N/A;  very high probabilty of converison to open    colectomy and ileostomy  1985    ENDOSCOPIC ULTRASOUND OF UPPER GASTROINTESTINAL TRACT N/A 2/10/2023    Procedure: ULTRASOUND, UPPER GI TRACT, ENDOSCOPIC;  Surgeon: Poli Fuller MD;   Location: [facility] ENDO;  Service: Endoscopy;  Laterality: N/A;    ERCP N/A 2/10/2023    Procedure: ERCP (ENDOSCOPIC RETROGRADE CHOLANGIOPANCREATOGRAPHY);  Surgeon: Poli Fuller MD;  Location: [facility] ENDO;  Service: Endoscopy;  Laterality: N/A;    EXCISION OF PAROTID GLAND Left 12/18/2020    Procedure: EXCISION, PAROTID GLAND;  Surgeon: Michael Pinzon MD;  Location: [facility] OR 2ND FLR;  Service: ENT;  Laterality: Left;    INSERTION OF IMPLANTABLE LOOP RECORDER  06/07/2021    INSERTION OF IMPLANTABLE LOOP RECORDER Left 6/7/2021    Procedure: INSERTION, IMPLANTABLE LOOP RECORDER;  Surgeon: Romario Dao MD;  Location: [facility] CATH LAB;  Service: Cardiology;  Laterality: Left;    LEFT HEART CATHETERIZATION Right 4/15/2021    Procedure: CATHETERIZATION, HEART, LEFT;  Surgeon: Marcio Jones MD;  Location: [facility] CATH LAB;  Service: Cardiology;  Laterality: Right;    LYSIS OF ADHESIONS N/A 11/9/2020    Procedure: LYSIS, ADHESIONS,  ERAS low;  Surgeon: CED Haley MD;  Location: [facility] OR 2ND FLR;  Service: Colon and Rectal;  Laterality: N/A;    LYSIS OF ADHESIONS N/A 2/14/2023    Procedure: LYSIS, ADHESIONS;  Surgeon: Mike Joyce MD;  Location: [facility] OR;  Service: General;  Laterality: N/A;    POUCHOSCOPY N/A 4/6/2022    Procedure: ENDOSCOPY, POUCH, SMALL INTESTINE, DIAGNOSTIC;  Surgeon: Dieter Juarez MD;  Location: [facility] ENDO (2ND FLR);  Service: Endoscopy;  Laterality: N/A;    REPAIR, HERNIA, PARASTOMAL N/A 11/9/2020    Procedure: REPAIR, HERNIA, PARASTOMAL;  Surgeon: CED Haley MD;  Location: [facility] OR 2ND FLR;  Service: Colon and Rectal;  Laterality: N/A;    TRANSURETHRAL RESECTION OF PROSTATE (TURP) WITHOUT USE OF LASER N/A 1/23/2019    Procedure: TURP, WITHOUT USING LASER BIPOLAR;  Surgeon: Catarino Mota MD;  Location: [facility] OR 1ST FLR;  Service: Urology;  Laterality: N/A;  1.5 HOURS    TREATMENT OF CARDIAC ARRHYTHMIA  4/30/2024    Procedure: Cardioversion or Defibrillation;  Surgeon: Claudia  Franky MAHAN MD;  Location: [facility] LAB;  Service: Cardiology;;      Family History   Problem Relation Name Age of Onset    Dementia Mother      Cancer Father      Diabetes Father      Heart disease Father      Melanoma Neg Hx      Hypertension Neg Hx      Arthritis Neg Hx         Social History     Tobacco Use    Smoking status: Never     Passive exposure: Never    Smokeless tobacco: Never   Substance Use Topics    Alcohol use: No    Drug use: No        OBJECTIVE:     Vital Signs (Most Recent)   Temp: 97.9 °F (36.6 °C) (01/24/25 0827)  Pulse: 80 (01/24/25 0827)  Resp: 16 (01/24/25 0827)  BP: 117/76 (01/24/25 0827)  SpO2: 98 % (01/24/25 0827)  Body mass index is 20.67 kg/m².      Physical Exam:  Physical Exam    Cardiac Enzymes: Ejection Fractions:    Recent Labs     01/23/25 1923 01/23/25 2221 01/24/25 0427   TROPONINI 0.059* 0.064* 0.066*    EF   Date Value Ref Range Status   08/05/2024 35 % Final   09/28/2023 60 % Final        Chemistries:   Recent Labs   Lab 01/18/25 1751 01/23/25 1923 01/24/25  0427    139 139   K 4.2 3.4* 3.9    101 101   CO2 22* 23 24   BUN 63* 56* 54*   CREATININE 3.0* 2.6* 2.6*   CALCIUM 8.9 8.9 9.2   PROT 6.4 6.7 6.6   BILITOT 1.2* 1.3* 1.1*   ALKPHOS 181* 144 138   ALT 41 18 18   AST 32 17 13   MG  --   --  1.9   PHOS  --   --  3.8        CBC/Anemia Labs: Coags:    Recent Labs   Lab 01/23/25 1923 01/24/25 0427 01/24/25  0836   WBC 9.52 7.68 6.39   HGB 11.9* 11.6* 11.1*   HCT 39.7* 39.5* 37.4*   * 116* 108*   MCV 92 92 90   RDW 20.5* 20.5* 20.2*    Recent Labs   Lab 01/23/25 1923 01/24/25  0836   INR 1.3* 1.4*   APTT 29.1 31.0        POCT Glucose: HbA1c:    Recent Labs   Lab 01/24/25  0826   POCTGLUCOSE 103    Hemoglobin A1C   Date Value Ref Range Status   12/27/2024 6.5 (H) 4.0 - 5.6 % Final     Comment:     ADA Screening Guidelines:  5.7-6.4%  Consistent with prediabetes  >or=6.5%  Consistent with diabetes    High levels of fetal hemoglobin interfere with the  HbA1C  assay. Heterozygous hemoglobin variants (HbS, HgC, etc)do  not significantly interfere with this assay.   However, presence of multiple variants may affect accuracy.     08/04/2024 6.8 (H) 4.0 - 5.6 % Final     Comment:     ADA Screening Guidelines:  5.7-6.4%  Consistent with prediabetes  >or=6.5%  Consistent with diabetes    High levels of fetal hemoglobin interfere with the HbA1C  assay. Heterozygous hemoglobin variants (HbS, HgC, etc)do  not significantly interfere with this assay.   However, presence of multiple variants may affect accuracy.     05/09/2024 7.0 (H) 4.0 - 5.6 % Final     Comment:     ADA Screening Guidelines:  5.7-6.4%  Consistent with prediabetes  >or=6.5%  Consistent with diabetes    High levels of fetal hemoglobin interfere with the HbA1C  assay. Heterozygous hemoglobin variants (HbS, HgC, etc)do  not significantly interfere with this assay.   However, presence of multiple variants may affect accuracy.          Lines/Drains:       Peripheral IV - Single Lumen 01/23/25 20 G Anterior;Left Forearm (Active)   Site Assessment Clean;Dry;Intact;No redness;No swelling 01/24/25 0215   Line Securement Device Secured with sutureless device 01/24/25 0215   Extremity Assessment Distal to IV No abnormal discoloration;No redness;No warmth;No swelling 01/24/25 0215   Line Status Saline locked;Flushed;Capped 01/24/25 0215   Dressing Status Dry;Clean;Intact 01/24/25 0215   Dressing Intervention Integrity maintained 01/24/25 0215   Reason Not Rotated Not due 01/24/25 0215   Number of days: 1            Ileostomy 01/01/84 RUQ (Active)   Appliance 1-piece;intact 01/24/25 0215   Stoma Function flatus;stool;thin liquid;brown 01/24/25 0215   Peristomal Assessment Clean;Intact;Dry 01/24/25 0215   Tolerance no signs/symptoms of discomfort 01/24/25 0215   Number of days: [id number]         ASSESSMENT/PLAN:     Active Hospital Problems    Diagnosis  POA    *Acute chest pain [R07.9]  Yes    Acute deep vein thrombosis  (DVT) of left lower extremity [I82.402]  Yes    CKD stage 4 due to type 2 diabetes mellitus [E11.22, N18.4]  Yes     Avoid nephrotoxic drug and NSAIDs      Type 2 diabetes mellitus with diabetic polyneuropathy, without long-term current use of insulin [E11.42]  Yes    Essential hypertension [I10]  Yes    HLD (hyperlipidemia) [E78.5]  Yes      Resolved Hospital Problems   No resolved problems to display.       SUMMARY: 74 y.o. male here with Acute chest pain with new LLE DVT after resolution of prior DVT  Patient has has prior surgeries without VTE, therefore unlikely underlying inherited hypercoagulable condition.   Review of imaging demonstrates CT AP 1/18/25 with concern for cystic vs solid pancreatic mass and cystic vs solid renal mass.  Patient also attests to 60lb weight loss     Recommendations:   Recommend contrast enhanced MRI abdomen to further investigate pancreatic and renal anomalies working up for malignancy   Recommend change to Xarelto from Eliquis  No indication for IVC filter at this time  Would not order hypercoagulable panel inpatient given confounding results often seen during acute event.     Follow up with hematology outpatient       Gail Morales MD  01/24/2025

## 2025-01-24 NOTE — PLAN OF CARE
Recommendations  1) Add Boost Glucose Control to every meal    2) Continue current diet and encourage intake of meals    3) Honor pts food preferences    4) RD to monitor and follow up    Goals: Pt will tolerate >75% EEN by RD follow up  Nutrition Goal Status: new  Communication of RD Recs:  (POC)

## 2025-01-24 NOTE — PROVIDER TRANSFER
Outside Transfer Acceptance Note / Regional Referral Center    Referring facility: [facility]  Referring provider: Jerry Tapia  Accepting facility: [facility]  Accepting provider: Darrell Garcia  Admitting provider: Darrell Garcia  Reason for transfer: Higher Level of Care , Potential IVC filter placement and Hematology evaluation  Transfer diagnosis: Recurrent DVT on AC  Transfer specialty requested: Hematology and IR/Cardiology   Transfer specialty notified: Yes  Transfer level: NUMBER 1-5: 2  Bed type requested: Telemetry  Isolation status: No active isolations   Admission class or status: IP- Inpatient    Narrative     74 M with PMH of CAD status post PCI to mid LAD in 2017, Hx of had VT, Atrial tachycardia s/p AT ablation in 4/2024 and DVT on Eliquis, CHF with the EF of 35%, SBO, s/p colostomy, GERD, parotid mass s/p resection, BPH & prostate CA s/p TURP, CKD 4, pleural plaque, orthostatic hypotension on midodrine, hypertension, hyperlipidemia, DM2, who presented to the ED at [facility] with reports of cough, congestion and bilateral chest pain.    His chest pain increased with coughing and moving his body as well as taking a deep breath over the last few days. Patient states that the chest started on left side of his chest and went to the right side. He states it feels like an aching. He states that his easy to breathe through his mouth, but he feels like he cannot breathe through his nose. He feels that the swelling in his legs house worsened.     His Exam was notable for bilateral lower limb edema therefore US of LL was obtained which revealed Left DVT of the greater saphenous vein. Labs also notable for mild troponin leak, chronically elevated BNP ~2000, CKD with Cr of 2.6 ( baseline 2.5 to 3). Patient could not have a CT angio done because of the CKD. Patient was treated with Lasix 20mg IV, Eliquis 5mg, ceftriaxone, Robaxin and Tylenol. UA was  unremarkable. Patient is being transferred for further evaluation of hypercoagulable state and treatment planning in the setting of failure of DOAC therapy. Patient also may need an IVC filter for prevention of Pes. Patient was discussed with  and NP Ronnell Canela at [facility] and accepted for transfer.    Patient will likely need Hematology evaluation for further anticoagulation management.      Objective     Vitals:    BP - 95/58 mm Hg  Pulse - 69  Temp - 98F  SPO2 - 98% on Room Air  Recent Labs: All pertinent labs within the past 24 hours have been reviewed.  CBC:   Recent Labs   Lab 01/23/25 1923   WBC 9.52   HGB 11.9*   HCT 39.7*   *     CMP:   Recent Labs   Lab 01/23/25 1923      K 3.4*      CO2 23   *   BUN 56*   CREATININE 2.6*   CALCIUM 8.9   PROT 6.7   ALBUMIN 3.2*   BILITOT 1.3*   ALKPHOS 144   AST 17   ALT 18   ANIONGAP 15     Cardiac Markers:   Recent Labs   Lab 01/23/25 1923   BNP 2,356*     Recent imaging: US LOWER EXTREMITY VEINS BILATERAL  FINDINGS:  Right thigh veins: The common femoral, femoral, popliteal, upper greater saphenous, and deep femoral veins are patent and free of thrombus.  Reflux seen within the common femoral vein.  The veins are normally compressible and otherwise have normal phasic flow and augmentation response.     Right calf veins: The visualized calf veins are patent.     Left thigh veins: Nonocclusive thrombus seen within the proximal portion of the greater saphenous vein.  The common femoral, femoral, popliteal, and deep femoral veins are patent and free of thrombus. The veins are normally compressible and have normal phasic flow and augmentation response.     Left calf veins: The visualized calf veins are patent.     Miscellaneous: None     Impression:     Nonocclusive DVT within the left greater saphenous vein.     No evidence of DVT within the right lower extremity.      Airway:   N/A  Vent settings:  N/A   IV access:         Peripheral IV - Single Lumen 01/23/25 1929 20 G Anterior;Left Forearm (Active)   Site Assessment Clean;Dry;Intact 01/23/25 1959   Line Status Flushed;Saline locked 01/23/25 1959   Dressing Status Clean;Dry;Intact 01/23/25 1959   Infusions: None  Allergies:   Review of patient's allergies indicates:   Allergen Reactions    Atorvastatin     Rosuvastatin      NPO: No  Anticoagulation: Eliquis    Instructions      [facility]  Admit to Hospital Medicine  Upon patient arrival to floor, please contact Hospital Medicine on call.

## 2025-01-24 NOTE — PROGRESS NOTES
[facility] ([address])  Wound Care    Patient Name:  Jarrett Charlton Jr.   MRN:  [id number]  Date: 1/24/2025  Diagnosis: Acute chest pain    History:     Past Medical History:   Diagnosis Date    Anticoagulant long-term use     Basal cell carcinoma     BPH (benign prostatic hyperplasia)     s/p TURP    Carotid stenosis     Chronic kidney disease     Claudication     DDD (degenerative disc disease) 10/21/2013    Diabetes mellitus with renal complications     Disc disease, degenerative, cervical     DVT (deep venous thrombosis)     Encounter for blood transfusion     GERD (gastroesophageal reflux disease)     Gout, chronic     History of ulcerative colitis     s/p colectomy and ileostomy    HLD (hyperlipidemia)     HTN (hypertension)     Ileostomy in place 1982    Paroxysmal atrial fibrillation     RBBB     Squamous cell carcinoma 03/08/2018    Left superior helix near insertion    Squamous cell carcinoma 04/12/2018    Left forearm x 5    Syncope 07/06/2024    Ventricular tachycardia        Social History     Socioeconomic History    Marital status:     Number of children: 1   Occupational History    Occupation:  x 44 years     Comment: Retired    Occupation: [place]    Tobacco Use    Smoking status: Never     Passive exposure: Never    Smokeless tobacco: Never   Substance and Sexual Activity    Alcohol use: No    Drug use: No    Sexual activity: Not Currently     Partners: Female     Social Drivers of Health     Financial Resource Strain: Low Risk  (1/24/2025)    Overall Financial Resource Strain (CARDIA)     Difficulty of Paying Living Expenses: Not very hard   Food Insecurity: No Food Insecurity (1/24/2025)    Hunger Vital Sign     Worried About Running Out of Food in the Last Year: Never true     Ran Out of Food in the Last Year: Never true   Transportation Needs: No Transportation Needs (1/24/2025)    TRANSPORTATION NEEDS     Transportation : No   Physical Activity:  Sufficiently Active (10/3/2024)    Exercise Vital Sign     Days of Exercise per Week: 7 days     Minutes of Exercise per Session: 150+ min   Recent Concern: Physical Activity - Inactive (8/29/2024)    Exercise Vital Sign     Days of Exercise per Week: 0 days     Minutes of Exercise per Session: 0 min   Stress: No Stress Concern Present (1/24/2025)    [employer] [place] [employer] - Occupational Stress Questionnaire     Feeling of Stress : Not at all   Housing Stability: Low Risk  (1/24/2025)    Housing Stability Vital Sign     Unable to Pay for Housing in the Last Year: No     Homeless in the Last Year: No       Precautions:     Allergies as of 01/23/2025 - Reviewed 01/23/2025   Allergen Reaction Noted    Atorvastatin  03/31/2021    Rosuvastatin  03/31/2021       [facility] Assessment Details/Treatment     Patient is a 74 year old male with a past medical history of CAD status post PCI to mid LAD in 2017, Hx of VT, Atrial tachycardia s/p AT ablation in 4/2024 and DVT on Eliquis, CHF with the EF of 35%, SBO, s/p colostomy, hypertension, hyperlipidemia, and DM2 who presented to the ED at [facility] with reports of cough, congestion and bilateral chest pain.  His chest pain increased with coughing and moving his body as well as taking a deep breath over the last few days. Patient states that the chest started on left side of his chest and went to the right side. He states it feels like an aching. He states that his easy to breathe through his mouth, but he feels like he cannot breathe through his nose. He feels that the swelling in his legs has worsened.  While in the ED, it was discovered that he has a DVT to the left lower extremity and decision was made to transfer to [facility] for possible IVC filter placement.     On arrival to [facility], the patient had severe chest pain radiating into the center of the chest.  Reports worsened shortness of breath.  He will be admitted to [facility] for further  management of his acute chest pain and Cardiology consult.    Patient with dry skin fissures in bilateral heels. Lac hydrin lotion ordered to manage dry skin. Nursing notified. Orders placed. Wound care will sign off.               01/24/2025

## 2025-01-25 PROBLEM — E11.9 TYPE 2 DIABETES MELLITUS: Status: ACTIVE | Noted: 2020-09-30

## 2025-01-25 PROBLEM — K86.9 PANCREATIC LESION: Status: ACTIVE | Noted: 2025-01-25

## 2025-01-25 LAB
ALBUMIN SERPL BCP-MCNC: 2.6 G/DL (ref 3.5–5.2)
ALP SERPL-CCNC: 112 U/L (ref 40–150)
ALT SERPL W/O P-5'-P-CCNC: 10 U/L (ref 10–44)
ANION GAP SERPL CALC-SCNC: 11 MMOL/L (ref 8–16)
APTT PPP: 58.4 SEC (ref 21–32)
APTT PPP: 62.9 SEC (ref 21–32)
AST SERPL-CCNC: 9 U/L (ref 10–40)
BASOPHILS # BLD AUTO: 0.02 K/UL (ref 0–0.2)
BASOPHILS # BLD AUTO: 0.02 K/UL (ref 0–0.2)
BASOPHILS NFR BLD: 0.4 % (ref 0–1.9)
BASOPHILS NFR BLD: 0.4 % (ref 0–1.9)
BILIRUB SERPL-MCNC: 0.8 MG/DL (ref 0.1–1)
BUN SERPL-MCNC: 52 MG/DL (ref 8–23)
CALCIUM SERPL-MCNC: 8.5 MG/DL (ref 8.7–10.5)
CHLORIDE SERPL-SCNC: 101 MMOL/L (ref 95–110)
CO2 SERPL-SCNC: 25 MMOL/L (ref 23–29)
CREAT SERPL-MCNC: 2.5 MG/DL (ref 0.5–1.4)
DIFFERENTIAL METHOD BLD: ABNORMAL
DIFFERENTIAL METHOD BLD: ABNORMAL
EOSINOPHIL # BLD AUTO: 0 K/UL (ref 0–0.5)
EOSINOPHIL # BLD AUTO: 0 K/UL (ref 0–0.5)
EOSINOPHIL NFR BLD: 0.4 % (ref 0–8)
EOSINOPHIL NFR BLD: 0.4 % (ref 0–8)
ERYTHROCYTE [DISTWIDTH] IN BLOOD BY AUTOMATED COUNT: 20.5 % (ref 11.5–14.5)
ERYTHROCYTE [DISTWIDTH] IN BLOOD BY AUTOMATED COUNT: 20.5 % (ref 11.5–14.5)
EST. GFR  (NO RACE VARIABLE): 26 ML/MIN/1.73 M^2
GLUCOSE SERPL-MCNC: 121 MG/DL (ref 70–110)
HCT VFR BLD AUTO: 32.9 % (ref 40–54)
HCT VFR BLD AUTO: 32.9 % (ref 40–54)
HGB BLD-MCNC: 10.3 G/DL (ref 14–18)
HGB BLD-MCNC: 10.3 G/DL (ref 14–18)
IMM GRANULOCYTES # BLD AUTO: 0.02 K/UL (ref 0–0.04)
IMM GRANULOCYTES # BLD AUTO: 0.02 K/UL (ref 0–0.04)
IMM GRANULOCYTES NFR BLD AUTO: 0.4 % (ref 0–0.5)
IMM GRANULOCYTES NFR BLD AUTO: 0.4 % (ref 0–0.5)
LYMPHOCYTES # BLD AUTO: 0.8 K/UL (ref 1–4.8)
LYMPHOCYTES # BLD AUTO: 0.8 K/UL (ref 1–4.8)
LYMPHOCYTES NFR BLD: 14.5 % (ref 18–48)
LYMPHOCYTES NFR BLD: 14.5 % (ref 18–48)
MAGNESIUM SERPL-MCNC: 1.7 MG/DL (ref 1.6–2.6)
MCH RBC QN AUTO: 28.1 PG (ref 27–31)
MCH RBC QN AUTO: 28.1 PG (ref 27–31)
MCHC RBC AUTO-ENTMCNC: 31.3 G/DL (ref 32–36)
MCHC RBC AUTO-ENTMCNC: 31.3 G/DL (ref 32–36)
MCV RBC AUTO: 90 FL (ref 82–98)
MCV RBC AUTO: 90 FL (ref 82–98)
MONOCYTES # BLD AUTO: 0.4 K/UL (ref 0.3–1)
MONOCYTES # BLD AUTO: 0.4 K/UL (ref 0.3–1)
MONOCYTES NFR BLD: 7.3 % (ref 4–15)
MONOCYTES NFR BLD: 7.3 % (ref 4–15)
NEUTROPHILS # BLD AUTO: 4 K/UL (ref 1.8–7.7)
NEUTROPHILS # BLD AUTO: 4 K/UL (ref 1.8–7.7)
NEUTROPHILS NFR BLD: 77 % (ref 38–73)
NEUTROPHILS NFR BLD: 77 % (ref 38–73)
NRBC BLD-RTO: 0 /100 WBC
NRBC BLD-RTO: 0 /100 WBC
PHOSPHATE SERPL-MCNC: 3.4 MG/DL (ref 2.7–4.5)
PLATELET # BLD AUTO: 90 K/UL (ref 150–450)
PLATELET # BLD AUTO: 90 K/UL (ref 150–450)
PMV BLD AUTO: 11.7 FL (ref 9.2–12.9)
PMV BLD AUTO: 11.7 FL (ref 9.2–12.9)
POCT GLUCOSE: 103 MG/DL (ref 70–110)
POCT GLUCOSE: 141 MG/DL (ref 70–110)
POCT GLUCOSE: 218 MG/DL (ref 70–110)
POCT GLUCOSE: 75 MG/DL (ref 70–110)
POTASSIUM SERPL-SCNC: 3.6 MMOL/L (ref 3.5–5.1)
PROT SERPL-MCNC: 5.6 G/DL (ref 6–8.4)
RBC # BLD AUTO: 3.66 M/UL (ref 4.6–6.2)
RBC # BLD AUTO: 3.66 M/UL (ref 4.6–6.2)
SODIUM SERPL-SCNC: 137 MMOL/L (ref 136–145)
WBC # BLD AUTO: 5.18 K/UL (ref 3.9–12.7)
WBC # BLD AUTO: 5.18 K/UL (ref 3.9–12.7)

## 2025-01-25 PROCEDURE — 21400001 HC TELEMETRY ROOM: Mod: HCNC

## 2025-01-25 PROCEDURE — 94761 N-INVAS EAR/PLS OXIMETRY MLT: CPT | Mod: HCNC

## 2025-01-25 PROCEDURE — 36415 COLL VENOUS BLD VENIPUNCTURE: CPT | Mod: HCNC | Performed by: HOSPITALIST

## 2025-01-25 PROCEDURE — 63600175 PHARM REV CODE 636 W HCPCS: Mod: HCNC | Performed by: HOSPITALIST

## 2025-01-25 PROCEDURE — 85730 THROMBOPLASTIN TIME PARTIAL: CPT | Mod: 91,HCNC | Performed by: HOSPITALIST

## 2025-01-25 PROCEDURE — 85025 COMPLETE CBC W/AUTO DIFF WBC: CPT | Mod: HCNC | Performed by: NURSE PRACTITIONER

## 2025-01-25 PROCEDURE — 25000003 PHARM REV CODE 250: Mod: HCNC | Performed by: NURSE PRACTITIONER

## 2025-01-25 PROCEDURE — 99233 SBSQ HOSP IP/OBS HIGH 50: CPT | Mod: HCNC,,, | Performed by: INTERNAL MEDICINE

## 2025-01-25 PROCEDURE — 84100 ASSAY OF PHOSPHORUS: CPT | Mod: HCNC | Performed by: NURSE PRACTITIONER

## 2025-01-25 PROCEDURE — 80053 COMPREHEN METABOLIC PANEL: CPT | Mod: HCNC | Performed by: NURSE PRACTITIONER

## 2025-01-25 PROCEDURE — 83735 ASSAY OF MAGNESIUM: CPT | Mod: HCNC | Performed by: NURSE PRACTITIONER

## 2025-01-25 RX ADMIN — METOPROLOL SUCCINATE 25 MG: 25 TABLET, EXTENDED RELEASE ORAL at 09:01

## 2025-01-25 RX ADMIN — SODIUM BICARBONATE 1300 MG: 650 TABLET ORAL at 08:01

## 2025-01-25 RX ADMIN — ALLOPURINOL 300 MG: 300 TABLET ORAL at 09:01

## 2025-01-25 RX ADMIN — SODIUM BICARBONATE 1300 MG: 650 TABLET ORAL at 09:01

## 2025-01-25 RX ADMIN — TAMSULOSIN HYDROCHLORIDE 0.4 MG: 0.4 CAPSULE ORAL at 09:01

## 2025-01-25 RX ADMIN — AMIODARONE HYDROCHLORIDE 200 MG: 200 TABLET ORAL at 08:01

## 2025-01-25 RX ADMIN — PRAVASTATIN SODIUM 40 MG: 20 TABLET ORAL at 09:01

## 2025-01-25 RX ADMIN — FAMOTIDINE 20 MG: 20 TABLET ORAL at 09:01

## 2025-01-25 RX ADMIN — POTASSIUM CHLORIDE 20 MEQ: 1500 TABLET, EXTENDED RELEASE ORAL at 09:01

## 2025-01-25 RX ADMIN — AMIODARONE HYDROCHLORIDE 200 MG: 200 TABLET ORAL at 09:01

## 2025-01-25 RX ADMIN — HEPARIN SODIUM 16 UNITS/KG/HR: 10000 INJECTION, SOLUTION INTRAVENOUS at 05:01

## 2025-01-25 RX ADMIN — FUROSEMIDE 40 MG: 10 INJECTION, SOLUTION INTRAMUSCULAR; INTRAVENOUS at 10:01

## 2025-01-25 NOTE — HOSPITAL COURSE
Patient is a 74-year-old man with diabetes mellitus type 2 (last hemoglobin A1c 6.5 percent last month), coronary artery disease status post stent placement to left anterior descending artery in 2017, history of atrial fibrillation and atrial tachycardia and status post ablation in 4/20/2024, chronic kidney disease stage 4, history of deep vein thrombosis on apixaban, chronic systolic heart failure with ejection fraction 35% who presented [facility] with shortness of breath and chest pain (10/10).  Ultrasound of the lower extremity revealed nonocclusive deep vein thrombosis on the left greater saphenous vein.     Patient reports history of ulcerative colitis for which he underwent bowel resection with ileostomy in 1985 and had small bowel obstruction recently which resolved without surgical intervention.  Patient reports poor oral intake since his bowel obstruction but reports stool output from ileostomy.     Patient with prior history a deep vein thrombosis in the left external iliac vein, common femoral vein, and greater saphenous vein visualized on ultrasound on 8/14/2024.     Patient transferred to [facility] for higher level of care.    Patient treated with intravenous heparin infusion for anticoagulation.  Patient also treated with intravenous diuretic to treat decompensated heart failure.     Per out for acute coronary syndrome based on serial troponins.  Chest pain negative.  V/Q scan negative low probability for pulmonary embolism.

## 2025-01-25 NOTE — SUBJECTIVE & OBJECTIVE
Interval History: Patient reports feeling better today.  Breathing more comfortably.  Denies chest pain.    Review of Systems   Constitutional:  Negative for chills and fever.   Respiratory:  Negative for shortness of breath.    Cardiovascular:  Negative for chest pain.   Gastrointestinal:  Negative for abdominal pain, constipation, diarrhea, nausea and vomiting.     Objective:     Vital Signs (Most Recent):  Temp: 97.9 °F (36.6 °C) (01/25/25 0747)  Pulse: 71 (01/25/25 1109)  Resp: 18 (01/25/25 0930)  BP: 102/70 (01/25/25 0930)  SpO2: 98 % (01/25/25 0747) Vital Signs (24h Range):  Temp:  [97.8 °F (36.6 °C)-97.9 °F (36.6 °C)] 97.9 °F (36.6 °C)  Pulse:  [65-97] 71  Resp:  [15-21] 18  SpO2:  [95 %-99 %] 98 %  BP: ()/(61-76) 102/70     Weight: 73 kg (160 lb 15 oz)  Body mass index is 20.66 kg/m².    Intake/Output Summary (Last 24 hours) at 1/25/2025 1140  Last data filed at 1/25/2025 0456  Gross per 24 hour   Intake 1060 ml   Output 950 ml   Net 110 ml         Physical Exam  Cardiovascular:      Rate and Rhythm: Normal rate and regular rhythm.      Heart sounds: Normal heart sounds. No murmur heard.     No friction rub. No gallop.   Pulmonary:      Effort: Pulmonary effort is normal. No respiratory distress.      Breath sounds: Rales present. No wheezing.   Abdominal:      General: Bowel sounds are normal. There is no distension.      Palpations: Abdomen is soft.      Tenderness: There is no abdominal tenderness.   Musculoskeletal:         General: Swelling present. No tenderness. Normal range of motion.      Right lower leg: Edema present.      Left lower leg: Edema present.   Skin:     General: Skin is warm and dry.      Coloration: Skin is not pale.      Findings: No erythema or rash.   Neurological:      Mental Status: He is alert and oriented to person, place, and time.             Significant Labs: All pertinent labs within the past 24 hours have been reviewed.    Significant Imaging: I have reviewed all  pertinent imaging results/findings within the past 24 hours.

## 2025-01-25 NOTE — PROGRESS NOTES
[facility] S[facility] ([address]  Cardiology  Progress Note    Patient Name: Jarrett Charlton Jr.  MRN: [id number]  Admission Date: 1/24/2025  Hospital Length of Stay: 1 days  Code Status: Full Code   Attending Physician: Darrell Cheema MD   Primary Care Physician: Tripp Mohan MD  Expected Discharge Date: 1/27/2025  Principal Problem:Acute deep vein thrombosis (DVT) of left lower extremity    Subjective:     Brief HPI:    Jarrett Charlton Jr. is a 74 y.o.male with hypertension, hypercholesterolemia and diabetes mellitus type 2. He has coronary artery disease having received a stent. His ascending aorta is mildly enlarged. He has issues with orthostatic hypotension for which he is on midodrine and used to be on fludrocortisone . He has undergone an ablation for atrial tachycardia. He has an implantable loop recorder. He has an ileostomy in the setting of Crohn's disease, has had prostate cancer and has advanced chronic kidney disease. He goes to the infusion center at [facility] to receive regular infusions. I saw him on 7/6/2024 after he presented to [facility] after having had syncope on 7/5/2024. He tested positive for COVID. I saw him again in 8/2024 as an inpatient when he presented with shortness of breath. In the fall of 2024 he had a deep venous trombosis in his right leg. He began apixiban. Lately, he has been evaluated for recurrence of atrial fibrillation. He began amiodarone. The plan is an ablation. The night of 1/23/2025 he presented to the emergency room at [facility] due to chest pain. His legs were edematous. He had a venous duplex that revealed a nonocclusive thrombus in the left greater saphenous vein. He was transferred to [facility] for his further care.    Hospital Course:     Heparin iv.    Diuresis.    Interval History:     Remains weak.    No further CP.    Review of Systems   Constitutional: Positive for  malaise/fatigue. Negative for chills and fever.   HENT:  Negative for nosebleeds.    Eyes:  Negative for vision loss in left eye and vision loss in right eye.   Cardiovascular:  Positive for leg swelling, orthopnea and palpitations. Negative for chest pain and paroxysmal nocturnal dyspnea.   Respiratory:  Positive for shortness of breath. Negative for cough, hemoptysis, sputum production and wheezing.    Hematologic/Lymphatic: Negative for bleeding problem. Does not bruise/bleed easily.   Skin:  Negative for color change and rash.   Musculoskeletal:  Negative for muscle weakness and myalgias.   Gastrointestinal:  Negative for abdominal pain, heartburn, hematemesis, hematochezia, melena, nausea and vomiting.   Genitourinary:  Negative for hematuria.   Neurological:  Positive for weakness. Negative for dizziness, focal weakness, headaches, light-headedness and vertigo.   Psychiatric/Behavioral:  Negative for altered mental status. The patient is not nervous/anxious.    Allergic/Immunologic: Negative for persistent infections.     Objective:     Vital Signs (Most Recent):  Temp: 97.9 °F (36.6 °C) (01/25/25 0747)  Pulse: 71 (01/25/25 1109)  Resp: 18 (01/25/25 0930)  BP: 102/70 (01/25/25 0930)  SpO2: 98 % (01/25/25 0747) Vital Signs (24h Range):  Temp:  [97.8 °F (36.6 °C)-97.9 °F (36.6 °C)] 97.9 °F (36.6 °C)  Pulse:  [65-97] 71  Resp:  [15-21] 18  SpO2:  [95 %-99 %] 98 %  BP: ()/(61-76) 102/70     Weight: 73 kg (160 lb 15 oz)  Body mass index is 20.66 kg/m².    SpO2: 98 %         Intake/Output Summary (Last 24 hours) at 1/25/2025 1340  Last data filed at 1/25/2025 0456  Gross per 24 hour   Intake 1060 ml   Output 950 ml   Net 110 ml       Lines/Drains/Airways       Drain  Duration                  Ileostomy 01/01/84 RUQ 37490 days              Peripheral Intravenous Line  Duration                  Peripheral IV - Single Lumen 01/23/25 20 G Anterior;Left Forearm 2 days                    Physical  Exam  Constitutional:       General: He is not in acute distress.     Appearance: Normal appearance. He is well-developed. He is ill-appearing. He is not toxic-appearing or diaphoretic.   HENT:      Head: Normocephalic and atraumatic.      Nose: Nose normal.   Eyes:      General:         Right eye: No discharge.         Left eye: No discharge.      Conjunctiva/sclera:      Right eye: Right conjunctiva is not injected.      Left eye: Left conjunctiva is not injected.      Pupils: Pupils are equal.      Right eye: Pupil is round.      Left eye: Pupil is round.   Neck:      Thyroid: No thyromegaly.      Vascular: No carotid bruit or JVD.   Cardiovascular:      Rate and Rhythm: Normal rate and regular rhythm. No extrasystoles are present.     Chest Wall: PMI is not displaced.      Pulses:           Radial pulses are 2+ on the right side and 2+ on the left side.        Femoral pulses are 2+ on the right side and 2+ on the left side.       Dorsalis pedis pulses are 1+ on the right side and 1+ on the left side.        Posterior tibial pulses are 1+ on the right side and 1+ on the left side.      Heart sounds: S1 normal and S2 normal. Murmur heard.      High-pitched blowing holosystolic murmur is present with a grade of 2/6 at the apex.      No gallop.   Pulmonary:      Effort: Pulmonary effort is normal.      Breath sounds: Examination of the right-lower field reveals rales. Examination of the left-lower field reveals rales. Rales present.   Abdominal:      Palpations: Abdomen is soft.      Tenderness: There is no abdominal tenderness.   Musculoskeletal:      Cervical back: Neck supple.      Right lower leg: Swelling present. 3+ Edema present.      Left lower leg: Swelling present. 3+ Edema present.   Lymphadenopathy:      Head:      Right side of head: No submandibular adenopathy.      Left side of head: No submandibular adenopathy.      Cervical: No cervical adenopathy.   Skin:     General: Skin is warm and dry.       Findings: No rash.      Nails: There is no clubbing.   Neurological:      General: No focal deficit present.      Mental Status: He is alert and oriented to person, place, and time. He is not disoriented.      Cranial Nerves: No cranial nerve deficit.   Psychiatric:         Attention and Perception: Attention normal.         Mood and Affect: Mood and affect normal.         Speech: Speech normal.         Behavior: Behavior normal.         Thought Content: Thought content normal.         Cognition and Memory: Cognition and memory normal.         Judgment: Judgment normal.         Current Medications:     allopurinoL  300 mg Oral Daily    amiodarone  200 mg Oral BID    famotidine  20 mg Oral Daily    ferrous sulfate  1 tablet Oral Every other day    furosemide (LASIX) injection  40 mg Intravenous Daily    heparin (PORCINE)  80 Units/kg Intravenous Once    metoprolol succinate  25 mg Oral Daily    potassium chloride  20 mEq Oral Daily    pravastatin  40 mg Oral Daily    sodium bicarbonate  1,300 mg Oral BID    tamsulosin  0.4 mg Oral Daily     Current Laboratory Results:    Recent Results (from the past 24 hours)   POCT glucose    Collection Time: 01/24/25  2:46 PM   Result Value Ref Range    POCT Glucose 203 (H) 70 - 110 mg/dL   APTT    Collection Time: 01/24/25  3:35 PM   Result Value Ref Range    aPTT 74.9 (H) 21.0 - 32.0 sec   POCT glucose    Collection Time: 01/24/25  7:50 PM   Result Value Ref Range    POCT Glucose 180 (H) 70 - 110 mg/dL   Comprehensive Metabolic Panel (CMP)    Collection Time: 01/25/25  1:39 AM   Result Value Ref Range    Sodium 137 136 - 145 mmol/L    Potassium 3.6 3.5 - 5.1 mmol/L    Chloride 101 95 - 110 mmol/L    CO2 25 23 - 29 mmol/L    Glucose 121 (H) 70 - 110 mg/dL    BUN 52 (H) 8 - 23 mg/dL    Creatinine 2.5 (H) 0.5 - 1.4 mg/dL    Calcium 8.5 (L) 8.7 - 10.5 mg/dL    Total Protein 5.6 (L) 6.0 - 8.4 g/dL    Albumin 2.6 (L) 3.5 - 5.2 g/dL    Total Bilirubin 0.8 0.1 - 1.0 mg/dL    Alkaline  Phosphatase 112 40 - 150 U/L    AST 9 (L) 10 - 40 U/L    ALT 10 10 - 44 U/L    eGFR 26 (A) >60 mL/min/1.73 m^2    Anion Gap 11 8 - 16 mmol/L   Magnesium    Collection Time: 01/25/25  1:39 AM   Result Value Ref Range    Magnesium 1.7 1.6 - 2.6 mg/dL   Phosphorus    Collection Time: 01/25/25  1:39 AM   Result Value Ref Range    Phosphorus 3.4 2.7 - 4.5 mg/dL   CBC with Automated Differential    Collection Time: 01/25/25  1:39 AM   Result Value Ref Range    WBC 5.18 3.90 - 12.70 K/uL    RBC 3.66 (L) 4.60 - 6.20 M/uL    Hemoglobin 10.3 (L) 14.0 - 18.0 g/dL    Hematocrit 32.9 (L) 40.0 - 54.0 %    MCV 90 82 - 98 fL    MCH 28.1 27.0 - 31.0 pg    MCHC 31.3 (L) 32.0 - 36.0 g/dL    RDW 20.5 (H) 11.5 - 14.5 %    Platelets 90 (L) 150 - 450 K/uL    MPV 11.7 9.2 - 12.9 fL    Immature Granulocytes 0.4 0.0 - 0.5 %    Gran # (ANC) 4.0 1.8 - 7.7 K/uL    Immature Grans (Abs) 0.02 0.00 - 0.04 K/uL    Lymph # 0.8 (L) 1.0 - 4.8 K/uL    Mono # 0.4 0.3 - 1.0 K/uL    Eos # 0.0 0.0 - 0.5 K/uL    Baso # 0.02 0.00 - 0.20 K/uL    nRBC 0 0 /100 WBC    Gran % 77.0 (H) 38.0 - 73.0 %    Lymph % 14.5 (L) 18.0 - 48.0 %    Mono % 7.3 4.0 - 15.0 %    Eosinophil % 0.4 0.0 - 8.0 %    Basophil % 0.4 0.0 - 1.9 %    Differential Method Automated    CBC auto differential    Collection Time: 01/25/25  1:39 AM   Result Value Ref Range    WBC 5.18 3.90 - 12.70 K/uL    RBC 3.66 (L) 4.60 - 6.20 M/uL    Hemoglobin 10.3 (L) 14.0 - 18.0 g/dL    Hematocrit 32.9 (L) 40.0 - 54.0 %    MCV 90 82 - 98 fL    MCH 28.1 27.0 - 31.0 pg    MCHC 31.3 (L) 32.0 - 36.0 g/dL    RDW 20.5 (H) 11.5 - 14.5 %    Platelets 90 (L) 150 - 450 K/uL    MPV 11.7 9.2 - 12.9 fL    Immature Granulocytes 0.4 0.0 - 0.5 %    Gran # (ANC) 4.0 1.8 - 7.7 K/uL    Immature Grans (Abs) 0.02 0.00 - 0.04 K/uL    Lymph # 0.8 (L) 1.0 - 4.8 K/uL    Mono # 0.4 0.3 - 1.0 K/uL    Eos # 0.0 0.0 - 0.5 K/uL    Baso # 0.02 0.00 - 0.20 K/uL    nRBC 0 0 /100 WBC    Gran % 77.0 (H) 38.0 - 73.0 %    Lymph % 14.5 (L) 18.0  - 48.0 %    Mono % 7.3 4.0 - 15.0 %    Eosinophil % 0.4 0.0 - 8.0 %    Basophil % 0.4 0.0 - 1.9 %    Differential Method Automated    APTT    Collection Time: 01/25/25  1:39 AM   Result Value Ref Range    aPTT 58.4 (H) 21.0 - 32.0 sec   APTT    Collection Time: 01/25/25  7:43 AM   Result Value Ref Range    aPTT 62.9 (H) 21.0 - 32.0 sec   POCT glucose    Collection Time: 01/25/25  7:50 AM   Result Value Ref Range    POCT Glucose 75 70 - 110 mg/dL   POCT glucose    Collection Time: 01/25/25 12:33 PM   Result Value Ref Range    POCT Glucose 103 70 - 110 mg/dL     Current Imaging Results:    MRI Abdomen Pelvis Without Contrast (XPD)   Final Result      1. No evidence for intra-abdominal solid mass, noting low sensitivity of noncontrast examination.   2. Nodular liver contour with trace perihepatic fluid suspicious for cirrhosis.   3. Pancreatic cystic lesion, similar to appearance on prior CT and suboptimally characterized on noncontrast assessment.  Recommend further evaluation with dedicated contrast enhanced MRI with MRCP.   4. Bilateral renal cysts.   5. Additional findings above.         Electronically signed by: Adin Ramirez MD   Date:    01/25/2025   Time:    08:54      NM Lung Scan Ventilation Perfusion   Final Result      Low intermediate probability (20-50%) of acute pulmonary embolism.         Electronically signed by: Daniel Velazquez MD   Date:    01/24/2025   Time:    12:49          4/15/2021: Cath:    The pre-procedure left ventricular end diastolic pressure was 12.  The ejection fraction was greater than 55% by visual estimate.  Patent stent in the mid LAD with 20% stenosis just distal to this. IFR was point 9 3  Circumflex with 20-30% mid stenosis IFR 0.99  Normal right coronary artery  Planned medical therapy continue amiodarone for ventricular tachycardia        8/5/2024: ECG: ST with APCs. RBBB. LAFB.        8/5/2024: Echo:  Left Ventricle: The left ventricle is normal in size. Moderately increased  wall thickness. There is concentric hypertrophy. Moderate global hypokinesis present. There is moderately reduced systolic function. Ejection fraction by visual approximation is 35%. Grade II diastolic dysfunction. Elevated left ventricular filling pressure. Tissue Doppler velocity is reduced.  Right Ventricle: Normal right ventricular cavity size. Wall thickness is normal. Systolic function is normal.  Left Atrium: Left atrium is severely dilated.  Right Atrium: Right atrium is moderately dilated.  Aortic Valve: There is mild aortic valve sclerosis. Mildly restricted motion.  Mitral Valve: There is moderate regurgitation with a centrally directed jet.  Tricuspid Valve: There is mild to moderate regurgitation with a centrally directed jet.  Pulmonary Artery: There is severe pulmonary hypertension. The estimated pulmonary artery systolic pressure is 79 mmHg.  IVC/SVC: Intermediate venous pressure at 8 mmHg.  Pericardium: There is a small posterior effusion.       1/24/2025: Echo:  Left Ventricle: The left ventricle is mildly dilated. Ventricular mass is normal. Normal wall thickness. Severe global hypokinesis present. There is severely reduced systolic function. Ejection fraction is approximately 30%. Grade I diastolic dysfunction.  Right Ventricle: Normal right ventricular cavity size. Wall thickness is normal. Right ventricle wall motion has global hypokinesis. Systolic function is moderately reduced.  Left Atrium: Left atrium is severely dilated.  Right Atrium: Right atrium is moderately dilated.  Aortic Valve: The aortic valve is a trileaflet valve. There is mild aortic valve sclerosis. There is mild aortic regurgitation with a centrally directed jet.  Mitral Valve: There is severe regurgitation with a centrally directed jet.  Tricuspid Valve: There is moderate to severe regurgitation with a centrally directed jet.  Pulmonary Artery: There is mild to moderate pulmonary hypertension. The estimated pulmonary artery  systolic pressure is 42 mmHg.  IVC/SVC: Elevated venous pressure at 15 mmHg.  Pericardium: There is a small circumferential effusion. Left pleural effusion.    Assessment and Plan:     Problem List:    Active Diagnoses:    Diagnosis Date Noted POA    PRINCIPAL PROBLEM:  Acute deep vein thrombosis (DVT) of left lower extremity [I82.402] 01/24/2025 Yes    Pancreatic lesion [K86.9] 01/25/2025 Yes    Moderate malnutrition [E44.0] 10/03/2024 Yes    Acute on chronic combined systolic and diastolic heart failure [I50.43] 09/11/2024 Yes    Chronic kidney disease, stage IV (severe) [N18.4] 02/11/2021 Yes    Type 2 diabetes mellitus [E11.9] 09/30/2020 Yes    Essential hypertension [I10]  Yes      Problems Resolved During this Admission:    Diagnosis Date Noted Date Resolved POA    HLD (hyperlipidemia) [E78.5]  01/25/2025 Yes     Assessment and Plan:     1. Heart Failure, Systolic, Acute on Chronic              8/5/2024: Echo: Normal left ventricular size and with moderate systolic dysfunction. EF 35%. Moderate LVH. Moderate diastolic dysfunction. Severely dilated LA. Moderate MR. SPAP 79 mmHg.              1/24/2025: Echo: Mildly dilated left ventricle with severe systolic dysfunction. EF 30%. Mild diastolic dysfunction. Severely dilated LA. Moderate RV dysfunction. Mild aortic valve sclerosis. Severe MR. Moderate to severe TR.  SPAP 42 mmHg. RA 15 mmHg. Small PE.              1/24/2025: Fluid overloaded in HF.              On metoprolol 25 mg Q24.              On furosemide 40 mg iv Q24.              Needs further diuresis.              Will be difficult to manage with advanced CKD and issues with hypotension.     2. Mitral Regurgitation              1/24/2025: Echo: Severe MR. Moderate to severe TR. SPAP 42 mmHg.              Diuresis.     3. Coronary Artery Disease              4/15/2021: Cath: Nonobstructive disease with patent LAD stent.              Not on aspirin 81 mg Q24 as on apixiban.              Appears stable.      4. Chest Pain              1/24/2025: CP.              Appeared atypical.     5. Atrial Fibrillation              Paroxysmal AF.              On apixiban.              On amiodarone.              No recent clinical recurrence.              Plan is ablation.     6. High Risk Medication              On amiodarone.     7. History of Atrial tachycardia              Underwent ablation.     8. Chronic Anticoagulation              On apixiban 5 mg Q12.     9. History of Deep Venous Trombosis of Lower Extremity              2024: Right leg DVT.              On apixiban.     10. Left Leg Thrombosis  1/24/2025: Asymptomatic thrombus in left greater saphenous vein.  On apixiban.  See no need for IVC Filter.            11. History of Syncope  7/5/2024: Syncope  Occurred after episode of diaphoresis in setting of testing  positive for COVID and having orthostatic hypotension.     12. Orthostatic Hypotension              On midodrine.  Used to be on fludrocortisone 100 mcg Q24.     13. Hypertension              On metoprolol 50 mg Q24 and furosemide 40 mg Q24.     14. Hypercholesterolemia              On pravastatin 40 mg Q24.     15. Diabetes Mellitus, Type 2     16. Chronic Kidney Disease, Stage 4              8/8/2024: BUN/crea 45/2.6. eGFR 25.              1/24/2025: BUN/crea 54/2.6. eGFR 25.                 17. Prostate Cancer     18. Ileostomy     19. Crohn's Disease                VTE Risk Mitigation (From admission, onward)           Ordered     heparin 25,000 units in dextrose 5% (100 units/ml) IV bolus from bag HIGH INTENSITY nomogram - [facility]  As needed (PRN)        Question:  Heparin Infusion Adjustment (DO NOT MODIFY ANSWER)  Answer:  [email or web address]\Images\Pharmacy\HeparinInfusions\heparin HIGH INTENSITY nomogram for [facility] [id number]    01/24/25 0821     heparin 25,000 units in dextrose 5% (100 units/ml) IV bolus from bag HIGH INTENSITY nomogram - [facility]  As needed (PRN)        Question:  Heparin Infusion Adjustment  (DO NOT MODIFY ANSWER)  Answer:  [email or web address]\Images\Pharmacy\HeparinInfusions\heparin HIGH INTENSITY nomogram for [facility] [id number]    01/24/25 0821     heparin 25,000 units in dextrose 5% (100 units/ml) IV bolus from bag HIGH INTENSITY nomogram - [facility]  Once        Question:  Heparin Infusion Adjustment (DO NOT MODIFY ANSWER)  Answer:  [email or web address]\Images\Pharmacy\HeparinInfusions\heparin HIGH INTENSITY nomogram for [facility] [id number]    01/24/25 0821     heparin 25,000 units in dextrose 5% 250 mL (100 units/mL) infusion HIGH INTENSITY nomogram - [facility]  Continuous        Question:  Begin at (units/kg/hr)  Answer:  18    01/24/25 0821     Reason for No Pharmacological VTE Prophylaxis  Once        Question:  Reasons:  Answer:  Already adequately anticoagulated on oral Anticoagulants    01/24/25 0305     IP VTE HIGH RISK PATIENT  Once         01/24/25 0305     Place sequential compression device  Until discontinued         01/24/25 0305                    Almaz Hernández MD  Cardiology  [facility] ([address])

## 2025-01-25 NOTE — PROGRESS NOTES
"[address] Medicine  Progress Note    Patient Name: Jarrett Charlton Jr.  MRN: [id number]  Patient Class: IP- Inpatient   Admission Date: 1/24/2025  Length of Stay: 1 days  Attending Physician: Darrell Cheema MD  Primary Care Provider: Tripp Mohan MD        Principal Problem:Acute deep vein thrombosis (DVT) of left lower extremity        HPI:  Per Abhi Canela, NP:    "The patient is a 74 year old male with a past medical history of CAD status post PCI to mid LAD in 2017, Hx of VT, Atrial tachycardia s/p AT ablation in 4/2024 and DVT on Eliquis, CHF with the EF of 35%, SBO, s/p colostomy, hypertension, hyperlipidemia, and DM2 who presented to the ED at [facility] with reports of cough, congestion and bilateral chest pain.  His chest pain increased with coughing and moving his body as well as taking a deep breath over the last few days. Patient states that the chest started on left side of his chest and went to the right side. He states it feels like an aching. He states that his easy to breathe through his mouth, but he feels like he cannot breathe through his nose. He feels that the swelling in his legs has worsened.  While in the ED, it was discovered that he has a DVT to the left lower extremity and decision was made to transfer to [facility] for possible IVC filter placement.    On arrival to [facility], the patient had severe chest pain radiating into the center of the chest.  Reports worsened shortness of breath.  He will be admitted to [facility] for further management of his acute chest pain and Cardiology consult."    Overview/Hospital Course:  Patient is a 74-year-old man with diabetes mellitus type 2 (last hemoglobin A1c 6.5 percent last month), coronary artery disease status post stent placement to left anterior descending artery in 2017, history of atrial fibrillation and atrial tachycardia and status post ablation in 4/20/2024, chronic kidney disease " stage 4, history of deep vein thrombosis on apixaban, chronic systolic heart failure with ejection fraction 35% who presented [facility] with shortness of breath and chest pain (10/10).  Ultrasound of the lower extremity revealed nonocclusive deep vein thrombosis on the left greater saphenous vein.     Patient reports history of ulcerative colitis for which he underwent bowel resection with ileostomy in 1985 and had small bowel obstruction recently which resolved without surgical intervention.  Patient reports poor oral intake since his bowel obstruction but reports stool output from ileostomy.     Patient with prior history a deep vein thrombosis in the left external iliac vein, common femoral vein, and greater saphenous vein visualized on ultrasound on 8/14/2024.     Patient transferred to [facility] for higher level of care.    Patient treated with intravenous heparin infusion for anticoagulation.  Patient also treated with intravenous diuretic to treat decompensated heart failure.     Per out for acute coronary syndrome based on serial troponins.  Chest pain negative.  V/Q scan negative low probability for pulmonary embolism.    Interval History: Patient reports feeling better today.  Breathing more comfortably.  Denies chest pain.    Review of Systems   Constitutional:  Negative for chills and fever.   Respiratory:  Negative for shortness of breath.    Cardiovascular:  Negative for chest pain.   Gastrointestinal:  Negative for abdominal pain, constipation, diarrhea, nausea and vomiting.     Objective:     Vital Signs (Most Recent):  Temp: 97.9 °F (36.6 °C) (01/25/25 0747)  Pulse: 71 (01/25/25 1109)  Resp: 18 (01/25/25 0930)  BP: 102/70 (01/25/25 0930)  SpO2: 98 % (01/25/25 0747) Vital Signs (24h Range):  Temp:  [97.8 °F (36.6 °C)-97.9 °F (36.6 °C)] 97.9 °F (36.6 °C)  Pulse:  [65-97] 71  Resp:  [15-21] 18  SpO2:  [95 %-99 %] 98 %  BP: ()/(61-76) 102/70     Weight: 73 kg (160 lb 15  oz)  Body mass index is 20.66 kg/m².    Intake/Output Summary (Last 24 hours) at 1/25/2025 1140  Last data filed at 1/25/2025 0456  Gross per 24 hour   Intake 1060 ml   Output 950 ml   Net 110 ml         Physical Exam  Cardiovascular:      Rate and Rhythm: Normal rate and regular rhythm.      Heart sounds: Normal heart sounds. No murmur heard.     No friction rub. No gallop.   Pulmonary:      Effort: Pulmonary effort is normal. No respiratory distress.      Breath sounds: Rales present. No wheezing.   Abdominal:      General: Bowel sounds are normal. There is no distension.      Palpations: Abdomen is soft.      Tenderness: There is no abdominal tenderness.   Musculoskeletal:         General: Swelling present. No tenderness. Normal range of motion.      Right lower leg: Edema present.      Left lower leg: Edema present.   Skin:     General: Skin is warm and dry.      Coloration: Skin is not pale.      Findings: No erythema or rash.   Neurological:      Mental Status: He is alert and oriented to person, place, and time.             Significant Labs: All pertinent labs within the past 24 hours have been reviewed.    Significant Imaging: I have reviewed all pertinent imaging results/findings within the past 24 hours.    Assessment and Plan     * Acute deep vein thrombosis (DVT) of left lower extremity  Ultrasound shows nonocclusive DVT within the left greater saphenous vein.  Patient with recurrence deep vein thrombosis despite taking apixaban.  Patient currently on a heparin infusion.  V/Q low probability for pulmonary embolus.       Evaluated by Hematology.  Recommendation to consider switching from apixaban to rivaroxaban on discharge. Overall would prefer to use apixaban over rivaroxaban in the setting of renal failure.  However in light of treatment failure considering utilizing different agent might be reasonable despite increased risk of bleeding complications.     labeling for rivaroxaban recommends  no dosage adjustment in patients with CrCl >=15 mL/minute.  Creatinine clearance 26 mL/min by Cockcroft-Gault equation.  Standard recommended dose for rivaroxaban for acute deep venous thrombosis is 15 mg twice daily for 21 days followed by 20 mg daily. Continue with heparin for now.      Will confer with cardiology and hematology.    Acute on chronic combined systolic and diastolic heart failure  Improving with intravenous diuretic therapy.  We will continue to achieve euvolemia.    Pancreatic lesion  Due to advanced kidney disease patient at risk for nephrogenic systemic fibrosis/nephrogenic fibrosing dermopathy.  MRI without contrast obtained showed no evidence for intra-abdominal solid mass.  Pancreatic cystic lesion noted.  Recommend outpatient follow-up for ERCP/EUS.    Essential hypertension  Reasonably controlled with current regimen.  Will continue with current regimen and continue to monitor.    Chronic kidney disease, stage IV (severe)  Creatine stable for now. BMP reviewed- noted Estimated Creatinine Clearance: 25.5 mL/min (A) (based on SCr of 2.6 mg/dL (H)). according to latest data. Based on current GFR, CKD stage is stage 4 - GFR 15-29.  Monitor UOP and serial BMP and adjust therapy as needed. Renally dose meds. Avoid nephrotoxic medications and procedures.    Moderate malnutrition  Nutrition consulted. Most recent weight and BMI monitored-     Measurements:  Wt Readings from Last 1 Encounters:   01/24/25 73 kg (160 lb 15 oz)   Body mass index is 20.66 kg/m².    Patient has been screened and assessed by RD.    Malnutrition Type:  Context: chronic illness  Level: moderate    Malnutrition Characteristic Summary:  Weight Loss (Malnutrition): greater than 20% in 1 year  Energy Intake (Malnutrition): less than 75% for greater than or equal to 3 months    Interventions/Recommendations (treatment strategy):  1) Add Boost Glucose Control to every meal  2) Continue current diet and encourage intake of meals   3) Honor pts food preferences  4) RD to monitor and follow up    Type 2 diabetes mellitus  Reasonably controlled with current regimen.  Will continue with current regimen and continue to monitor.      VTE Risk Mitigation (From admission, onward)           Ordered     heparin 25,000 units in dextrose 5% (100 units/ml) IV bolus from bag HIGH INTENSITY nomogram - [facility]  As needed (PRN)        Question:  Heparin Infusion Adjustment (DO NOT MODIFY ANSWER)  Answer:  [email or web address]\Images\Pharmacy\HeparinInfusions\heparin HIGH INTENSITY nomogram for [facility] [id number]    01/24/25 0821     heparin 25,000 units in dextrose 5% (100 units/ml) IV bolus from bag HIGH INTENSITY nomogram - [facility]  As needed (PRN)        Question:  Heparin Infusion Adjustment (DO NOT MODIFY ANSWER)  Answer:  [email or web address]\Images\Pharmacy\HeparinInfusions\heparin HIGH INTENSITY nomogram for [facility] [id number]    01/24/25 0821     heparin 25,000 units in dextrose 5% (100 units/ml) IV bolus from bag HIGH INTENSITY nomogram - [facility]  Once        Question:  Heparin Infusion Adjustment (DO NOT MODIFY ANSWER)  Answer:  [email or web address]\[email or web address]\Images\Pharmacy\HeparinInfusions\heparin HIGH INTENSITY nomogram for [facility] [id number]    01/24/25 0821     heparin 25,000 units in dextrose 5% 250 mL (100 units/mL) infusion HIGH INTENSITY nomogram - [facility]  Continuous        Question:  Begin at (units/kg/hr)  Answer:  18    01/24/25 0821     Reason for No Pharmacological VTE Prophylaxis  Once        Question:  Reasons:  Answer:  Already adequately anticoagulated on oral Anticoagulants    01/24/25 0305     IP VTE HIGH RISK PATIENT  Once         01/24/25 0305     Place sequential compression device  Until discontinued         01/24/25 0305                    Darrell Cheema MD  Department of Hospital Medicine   [facility] - [facility] ([address])

## 2025-01-25 NOTE — CONSULTS
[facility] ([address])  Cardiology  Consult Note    Patient Name: Jarrett Charlton Jr.  MRN: [id number]  Admission Date: 1/24/2025  Hospital Length of Stay: 0 days  Code Status: Full Code   Attending Provider: Darrell Cheema MD   Consulting Provider: Almaz Hernández MD  Primary Care Physician: Tripp Mohan MD  Principal Problem:Acute chest pain    Patient information was obtained from patient, past medical records, and ER records.     Inpatient consult to Cardiology  Consult performed by: Almaz Hernández MD  Consult ordered by: Darrell Cheema MD  Reason for consult: Consideration for IVC Filter        Subjective:     Chief Complaint:  Chest pain.    HPI:     Jarrett Charlton Jr. is a 74 y.o.male with hypertension, hypercholesterolemia and diabetes mellitus type 2. He has coronary artery disease having received a stent. His ascending aorta is mildly enlarged. He has issues with orthostatic hypotension for which he is on midodrine and used to be on fludrocortisone . He has undergone an ablation for atrial tachycardia. He has an implantable loop recorder. He has an ileostomy in the setting of Crohn's disease, has had prostate cancer and has advanced chronic kidney disease. He goes to the infusion center at [facility] to receive regular infusions. I saw him on 7/6/2024 after he presented to [facility] after having had syncope on 7/5/2024. He tested positive for COVID. I saw him again in 8/2024 as an inpatient when he presented with shortness of breath. In the fall of 2024 he had a deep venous trombosis in his right leg. He began apixiban. Lately, he has been evaluated for recurrence of atrial fibrillation. He began amiodarone. The plan is an ablation. The night of 1/23/2025 he presented to the emergency room at [facility] due to chest pain. His legs were edematous. He had a venous duplex that revealed a nonocclusive thrombus in the left greater saphenous  vein. He was transferred to [facility] for his further care.      Past Medical History:   Diagnosis Date    Anticoagulant long-term use     Basal cell carcinoma     BPH (benign prostatic hyperplasia)     s/p TURP    Carotid stenosis     Chronic kidney disease     Claudication     DDD (degenerative disc disease) 10/21/2013    Diabetes mellitus with renal complications     Disc disease, degenerative, cervical     DVT (deep venous thrombosis)     Encounter for blood transfusion     GERD (gastroesophageal reflux disease)     Gout, chronic     History of ulcerative colitis     s/p colectomy and ileostomy    HLD (hyperlipidemia)     HTN (hypertension)     Ileostomy in place 1982    Paroxysmal atrial fibrillation     RBBB     Squamous cell carcinoma 03/08/2018    Left superior helix near insertion    Squamous cell carcinoma 04/12/2018    Left forearm x 5    Syncope 07/06/2024    Ventricular tachycardia        Past Surgical History:   Procedure Laterality Date    ABLATION, SVT, ACCESSORY PATHWAY N/A 4/30/2024    Procedure: Ablation, SVT, Accessory Pathway;  Surgeon: Franky Taylor MD;  Location: [facility] EP LAB;  Service: Cardiology;  Laterality: N/A;  VT, RFA, Carto, MAC, GP, 3 Prep *MDT ILR in situ*    cardiac stents      CATARACT EXTRACTION Bilateral     CERVICAL FUSION      CHOLECYSTECTOMY N/A 2/14/2023    Procedure: CHOLECYSTECTOMY;  Surgeon: Mike Joyce MD;  Location: [facility] OR;  Service: General;  Laterality: N/A;  very high probabilty of converison to open    colectomy and ileostomy  1985    ENDOSCOPIC ULTRASOUND OF UPPER GASTROINTESTINAL TRACT N/A 2/10/2023    Procedure: ULTRASOUND, UPPER GI TRACT, ENDOSCOPIC;  Surgeon: Poli Fuller MD;  Location: [facility] ENDO;  Service: Endoscopy;  Laterality: N/A;    ERCP N/A 2/10/2023    Procedure: ERCP (ENDOSCOPIC RETROGRADE CHOLANGIOPANCREATOGRAPHY);  Surgeon: Poli Fuller MD;  Location: [facility] ENDO;  Service: Endoscopy;  Laterality: N/A;     EXCISION OF PAROTID GLAND Left 12/18/2020    Procedure: EXCISION, PAROTID GLAND;  Surgeon: Michael Pinzon MD;  Location: [facility] OR 2ND FLR;  Service: ENT;  Laterality: Left;    INSERTION OF IMPLANTABLE LOOP RECORDER  06/07/2021    INSERTION OF IMPLANTABLE LOOP RECORDER Left 6/7/2021    Procedure: INSERTION, IMPLANTABLE LOOP RECORDER;  Surgeon: Romario Dao MD;  Location: [facility] CATH LAB;  Service: Cardiology;  Laterality: Left;    LEFT HEART CATHETERIZATION Right 4/15/2021    Procedure: CATHETERIZATION, HEART, LEFT;  Surgeon: Marcio Jones MD;  Location: [facility] CATH LAB;  Service: Cardiology;  Laterality: Right;    LYSIS OF ADHESIONS N/A 11/9/2020    Procedure: LYSIS, ADHESIONS,  ERAS low;  Surgeon: CED Haley MD;  Location: [facility] OR 2ND FLR;  Service: Colon and Rectal;  Laterality: N/A;    LYSIS OF ADHESIONS N/A 2/14/2023    Procedure: LYSIS, ADHESIONS;  Surgeon: Mike Joyce MD;  Location: [facility] OR;  Service: General;  Laterality: N/A;    POUCHOSCOPY N/A 4/6/2022    Procedure: ENDOSCOPY, POUCH, SMALL INTESTINE, DIAGNOSTIC;  Surgeon: Dieter Juarez MD;  Location: [facility] ENDO (2ND FLR);  Service: Endoscopy;  Laterality: N/A;    REPAIR, HERNIA, PARASTOMAL N/A 11/9/2020    Procedure: REPAIR, HERNIA, PARASTOMAL;  Surgeon: CED Haley MD;  Location: [facility] OR 2ND FLR;  Service: Colon and Rectal;  Laterality: N/A;    TRANSURETHRAL RESECTION OF PROSTATE (TURP) WITHOUT USE OF LASER N/A 1/23/2019    Procedure: TURP, WITHOUT USING LASER BIPOLAR;  Surgeon: Catarino Mota MD;  Location: [facility] OR 1ST FLR;  Service: Urology;  Laterality: N/A;  1.5 HOURS    TREATMENT OF CARDIAC ARRHYTHMIA  4/30/2024    Procedure: Cardioversion or Defibrillation;  Surgeon: Franky Taylor MD;  Location: [facility] EP LAB;  Service: Cardiology;;       Review of patient's allergies indicates:   Allergen Reactions    Atorvastatin     Rosuvastatin        Current Facility-Administered Medications on File Prior to Encounter    Medication    [COMPLETED] apixaban tablet 5 mg    [COMPLETED] benzonatate capsule 100 mg    [COMPLETED] furosemide injection 20 mg    [COMPLETED] midodrine tablet 10 mg    sodium chloride 0.9% in 1,000 mL infusion     Current Outpatient Medications on File Prior to Encounter   Medication Sig    ACCU-CHEK PAUL CONTROL SOLN Soln 1 drop by NOT APPLICABLE route once daily.    ACCU-CHEK SOFTCLIX LANCETS [facility] TEST BLOOD SUGAR THREE TIMES DAILY    alcohol swabs (DROPSAFE ALCOHOL PREP PADS) PadM Apply 1 each topically once daily.    allopurinoL (ZYLOPRIM) 100 MG tablet Take 4 tablets (400 mg total) by mouth once daily.    amiodarone (PACERONE) 200 MG Tab Take 1 tablet (200 mg total) by mouth 2 (two) times daily.    apixaban (ELIQUIS) 5 mg Tab Take 1 tablet (5 mg total) by mouth 2 (two) times daily.    aspirin (ECOTRIN) 81 MG EC tablet Take 1 tablet (81 mg total) by mouth once daily.    blood sugar diagnostic (ACCU-CHEK GUIDE TEST STRIPS) Strp 1 each by Other route 3 (three) times daily. Test Blood Sugar    blood-glucose meter (ACCU-CHEK GUIDE GLUCOSE METER) [facility] Accucheck BID    brimonidine 0.2% (ALPHAGAN) 0.2 % Drop Place 1 drop into both eyes 2 (two) times a day.    colchicine (COLCRYS) 0.6 mg tablet Take 1 tablet (0.6 mg total) by mouth every other day.    famotidine (PEPCID) 20 MG tablet Take 1 tablet (20 mg total) by mouth once daily.    ferrous sulfate (IRON) 325 mg (65 mg iron) Tab tablet Take 1 tablet (325 mg total) by mouth every other day.    furosemide (LASIX) 40 MG tablet Take 1 tablet (40 mg total) by mouth 2 (two) times daily.    glimepiride (AMARYL) 4 MG tablet TAKE 1 TABLET TWICE DAILY BEFORE MEALS    hydrALAZINE (APRESOLINE) 10 MG tablet Take by mouth.    HYDROcodone-acetaminophen (NORCO) 5-325 mg per tablet Take 1 tablet by mouth every 12 (twelve) hours as needed for Pain. (Patient not taking: Reported on 1/7/2025)    LIDOcaine (LIDODERM) 5 % Place 1 patch onto the skin once daily. Remove & Discard  patch within 12 hours or as directed by MD (Patient not taking: Reported on 1/7/2025)    magnesium oxide (MAG-OX) 400 mg (241.3 mg magnesium) tablet Take 400 mg by mouth once daily.    metoprolol succinate (TOPROL-XL) 25 MG 24 hr tablet Take 1 tablet (25 mg total) by mouth once daily.    midodrine (PROAMATINE) 10 MG tablet Take 1 tablet (10 mg total) by mouth 3 (three) times daily with meals.    ondansetron (ZOFRAN) 4 MG tablet Take 1 tablet (4 mg total) by mouth every 8 (eight) hours as needed.    pantoprazole (PROTONIX) 40 MG tablet TAKE 1 TABLET ONE TIME DAILY    pravastatin (PRAVACHOL) 40 MG tablet Take 1 tablet (40 mg total) by mouth once daily.    sodium bicarbonate 650 MG tablet Take 2 tablets (1,300 mg total) by mouth 2 (two) times daily.    tamsulosin (FLOMAX) 0.4 mg Cap Take 1 capsule (0.4 mg total) by mouth once daily.    temazepam (RESTORIL) 15 mg Cap Take 1 capsule (15 mg total) by mouth nightly as needed.    traMADoL (ULTRAM) 50 mg tablet Take 1 tablet (50 mg total) by mouth every 8 (eight) hours as needed for Pain.     Family History       Problem Relation (Age of Onset)    Cancer Father    Dementia Mother    Diabetes Father    Heart disease Father          Tobacco Use    Smoking status: Never     Passive exposure: Never    Smokeless tobacco: Never   Substance and Sexual Activity    Alcohol use: No    Drug use: No    Sexual activity: Not Currently     Partners: Female     Review of Systems   Constitutional: Positive for malaise/fatigue and weight loss. Negative for chills and fever.   HENT:  Negative for nosebleeds and tinnitus.    Eyes:  Negative for double vision, vision loss in left eye and vision loss in right eye.   Cardiovascular:  Positive for chest pain, dyspnea on exertion, leg swelling and palpitations. Negative for claudication, irregular heartbeat, near-syncope, orthopnea, paroxysmal nocturnal dyspnea and syncope.   Respiratory:  Positive for shortness of breath. Negative for cough,  hemoptysis and wheezing.    Endocrine: Negative for cold intolerance and heat intolerance.   Hematologic/Lymphatic: Negative for bleeding problem. Does not bruise/bleed easily.   Skin:  Negative for color change and rash.   Musculoskeletal:  Negative for back pain, falls, muscle weakness and myalgias.   Gastrointestinal:  Negative for abdominal pain, diarrhea, dysphagia, heartburn, hematemesis, hematochezia, hemorrhoids, jaundice, melena, nausea and vomiting.   Genitourinary:  Negative for dysuria and hematuria.   Neurological:  Positive for weakness. Negative for dizziness, focal weakness, headaches, light-headedness, loss of balance, numbness, tremors and vertigo.   Psychiatric/Behavioral:  Negative for altered mental status, depression and memory loss. The patient is not nervous/anxious.    Allergic/Immunologic: Negative for hives and persistent infections.     Objective:     Vital Signs (Most Recent):  Temp: 97.8 °F (36.6 °C) (01/24/25 1944)  Pulse: 97 (01/24/25 1944)  Resp: 16 (01/24/25 1944)  BP: 115/73 (01/24/25 1944)  SpO2: 98 % (01/24/25 1944) Vital Signs (24h Range):  Temp:  [97.5 °F (36.4 °C)-98 °F (36.7 °C)] 97.8 °F (36.6 °C)  Pulse:  [69-97] 97  Resp:  [15-20] 16  SpO2:  [97 %-99 %] 98 %  BP: ()/(58-76) 115/73     Weight: 73 kg (160 lb 15 oz)  Body mass index is 20.66 kg/m².    SpO2: 98 %         Intake/Output Summary (Last 24 hours) at 1/24/2025 2018  Last data filed at 1/24/2025 2008  Gross per 24 hour   Intake 500 ml   Output 725 ml   Net -225 ml       Lines/Drains/Airways       Drain  Duration                  Ileostomy 01/01/84 RUQ 44426 days              Peripheral Intravenous Line  Duration                  Peripheral IV - Single Lumen 01/23/25 20 G Anterior;Left Forearm 1 day                    Physical Exam  Constitutional:       General: He is not in acute distress.     Appearance: Normal appearance. He is well-developed. He is ill-appearing. He is not toxic-appearing or diaphoretic.    HENT:      Head: Normocephalic and atraumatic.      Nose: Nose normal.   Eyes:      General:         Right eye: No discharge.         Left eye: No discharge.      Conjunctiva/sclera:      Right eye: Right conjunctiva is not injected.      Left eye: Left conjunctiva is not injected.      Pupils: Pupils are equal.      Right eye: Pupil is round.      Left eye: Pupil is round.   Neck:      Thyroid: No thyromegaly.      Vascular: No carotid bruit or JVD.   Cardiovascular:      Rate and Rhythm: Normal rate and regular rhythm. No extrasystoles are present.     Chest Wall: PMI is not displaced.      Pulses:           Radial pulses are 2+ on the right side and 2+ on the left side.        Femoral pulses are 2+ on the right side and 2+ on the left side.       Dorsalis pedis pulses are 0 on the right side and 0 on the left side.        Posterior tibial pulses are 0 on the right side and 0 on the left side.      Heart sounds: S1 normal and S2 normal. Murmur heard.      High-pitched blowing holosystolic murmur is present at the apex.      No gallop.   Pulmonary:      Effort: Pulmonary effort is normal.      Breath sounds: Rhonchi present.   Abdominal:      Palpations: Abdomen is soft.      Tenderness: There is no abdominal tenderness.      Comments: Ileostomy.   Musculoskeletal:      Cervical back: Neck supple.      Right lower leg: Normal. No swelling. No edema.      Left lower leg: Normal. No swelling. No edema.   Lymphadenopathy:      Head:      Right side of head: No submandibular adenopathy.      Left side of head: No submandibular adenopathy.      Cervical: No cervical adenopathy.   Skin:     General: Skin is warm and dry.      Findings: No rash.      Nails: There is no clubbing.   Neurological:      General: No focal deficit present.      Mental Status: He is alert and oriented to person, place, and time. He is not disoriented.      Cranial Nerves: No cranial nerve deficit.   Psychiatric:         Attention and  Perception: Attention normal.         Mood and Affect: Affect normal. Mood is depressed.         Speech: Speech normal.         Behavior: Behavior normal.         Thought Content: Thought content normal.         Cognition and Memory: Cognition and memory normal.         Judgment: Judgment normal.         Current Medications:     allopurinoL  300 mg Oral Daily    amiodarone  200 mg Oral BID    famotidine  20 mg Oral Daily    ferrous sulfate  1 tablet Oral Every other day    furosemide (LASIX) injection  40 mg Intravenous Daily    heparin (PORCINE)  80 Units/kg Intravenous Once    metoprolol succinate  25 mg Oral Daily    potassium chloride  20 mEq Oral Daily    pravastatin  40 mg Oral Daily    sodium bicarbonate  1,300 mg Oral BID    tamsulosin  0.4 mg Oral Daily     Current Laboratory Results:    Recent Results (from the past 24 hours)   Troponin I    Collection Time: 01/23/25 10:21 PM   Result Value Ref Range    Troponin I 0.064 (H) 0.000 - 0.026 ng/mL   EKG 12-lead    Collection Time: 01/24/25  2:37 AM   Result Value Ref Range    QRS Duration 152 ms    [facility] QTC Calculation 499 ms   CBC Auto Differential    Collection Time: 01/24/25  4:27 AM   Result Value Ref Range    WBC 7.68 3.90 - 12.70 K/uL    RBC 4.29 (L) 4.60 - 6.20 M/uL    Hemoglobin 11.6 (L) 14.0 - 18.0 g/dL    Hematocrit 39.5 (L) 40.0 - 54.0 %    MCV 92 82 - 98 fL    MCH 27.0 27.0 - 31.0 pg    MCHC 29.4 (L) 32.0 - 36.0 g/dL    RDW 20.5 (H) 11.5 - 14.5 %    Platelets 116 (L) 150 - 450 K/uL    MPV 11.1 9.2 - 12.9 fL    Immature Granulocytes 0.4 0.0 - 0.5 %    Gran # (ANC) 5.8 1.8 - 7.7 K/uL    Immature Grans (Abs) 0.03 0.00 - 0.04 K/uL    Lymph # 1.2 1.0 - 4.8 K/uL    Mono # 0.6 0.3 - 1.0 K/uL    Eos # 0.0 0.0 - 0.5 K/uL    Baso # 0.02 0.00 - 0.20 K/uL    nRBC 0 0 /100 WBC    Gran % 75.8 (H) 38.0 - 73.0 %    Lymph % 15.4 (L) 18.0 - 48.0 %    Mono % 7.8 4.0 - 15.0 %    Eosinophil % 0.3 0.0 - 8.0 %    Basophil % 0.3 0.0 - 1.9 %    Differential Method Automated     Comprehensive Metabolic Panel    Collection Time: 01/24/25  4:27 AM   Result Value Ref Range    Sodium 139 136 - 145 mmol/L    Potassium 3.9 3.5 - 5.1 mmol/L    Chloride 101 95 - 110 mmol/L    CO2 24 23 - 29 mmol/L    Glucose 121 (H) 70 - 110 mg/dL    BUN 54 (H) 8 - 23 mg/dL    Creatinine 2.6 (H) 0.5 - 1.4 mg/dL    Calcium 9.2 8.7 - 10.5 mg/dL    Total Protein 6.6 6.0 - 8.4 g/dL    Albumin 3.1 (L) 3.5 - 5.2 g/dL    Total Bilirubin 1.1 (H) 0.1 - 1.0 mg/dL    Alkaline Phosphatase 138 40 - 150 U/L    AST 13 10 - 40 U/L    ALT 18 10 - 44 U/L    eGFR 25 (A) >60 mL/min/1.73 m^2    Anion Gap 14 8 - 16 mmol/L   Magnesium    Collection Time: 01/24/25  4:27 AM   Result Value Ref Range    Magnesium 1.9 1.6 - 2.6 mg/dL   Phosphorus    Collection Time: 01/24/25  4:27 AM   Result Value Ref Range    Phosphorus 3.8 2.7 - 4.5 mg/dL   Troponin I    Collection Time: 01/24/25  4:27 AM   Result Value Ref Range    Troponin I 0.066 (H) 0.000 - 0.026 ng/mL   BNP    Collection Time: 01/24/25  4:27 AM   Result Value Ref Range    BNP 2,586 (H) 0 - 99 pg/mL   POCT glucose    Collection Time: 01/24/25  8:26 AM   Result Value Ref Range    POCT Glucose 103 70 - 110 mg/dL   APTT    Collection Time: 01/24/25  8:36 AM   Result Value Ref Range    aPTT 31.0 21.0 - 32.0 sec   Protime-INR    Collection Time: 01/24/25  8:36 AM   Result Value Ref Range    Prothrombin Time 15.8 (H) 9.0 - 12.5 sec    INR 1.4 (H) 0.8 - 1.2   CBC auto differential    Collection Time: 01/24/25  8:36 AM   Result Value Ref Range    WBC 6.39 3.90 - 12.70 K/uL    RBC 4.17 (L) 4.60 - 6.20 M/uL    Hemoglobin 11.1 (L) 14.0 - 18.0 g/dL    Hematocrit 37.4 (L) 40.0 - 54.0 %    MCV 90 82 - 98 fL    MCH 26.6 (L) 27.0 - 31.0 pg    MCHC 29.7 (L) 32.0 - 36.0 g/dL    RDW 20.2 (H) 11.5 - 14.5 %    Platelets 108 (L) 150 - 450 K/uL    MPV SEE COMMENT 9.2 - 12.9 fL    Immature Granulocytes 0.2 0.0 - 0.5 %    Gran # (ANC) 5.1 1.8 - 7.7 K/uL    Immature Grans (Abs) 0.01 0.00 - 0.04 K/uL    Lymph  # 0.8 (L) 1.0 - 4.8 K/uL    Mono # 0.5 0.3 - 1.0 K/uL    Eos # 0.0 0.0 - 0.5 K/uL    Baso # 0.02 0.00 - 0.20 K/uL    nRBC 0 0 /100 WBC    Gran % 79.3 (H) 38.0 - 73.0 %    Lymph % 12.7 (L) 18.0 - 48.0 %    Mono % 7.2 4.0 - 15.0 %    Eosinophil % 0.3 0.0 - 8.0 %    Basophil % 0.3 0.0 - 1.9 %    Differential Method Automated    Echo    Collection Time: 01/24/25  8:40 AM   Result Value Ref Range    BSA 1.95 m2    LVOT stroke volume 51.9 cm3    LVIDd 5.9 3.5 - 6.0 cm    LV Systolic Volume 131.90 mL    LV Systolic Volume Index 66.6 mL/m2    LVIDs 5.2 (A) 2.1 - 4.0 cm    LV Diastolic Volume 171.94 mL    LV Diastolic Volume Index 86.84 mL/m2    Left Ventricular End Systolic Volume by Teichholz Method 131.90 mL    Left Ventricular End Diastolic Volume by Teichholz Method 171.94 mL    IVS 0.9 0.6 - 1.1 cm    LVOT diameter 2.3 cm    LVOT area 4.2 cm2    FS 11.9 (A) 28 - 44 %    Left Ventricle Relative Wall Thickness 0.27 cm    PW 0.8 0.6 - 1.1 cm    LV mass 195.0 g    LV Mass Index 98.5 g/m2    MV Peak E Joao 0.94 m/s    TR Max Ojao 2.6 m/s    IVRT 46 msec    E wave deceleration time 132 msec    PV Peak S Joao 0.61 m/s    LVOT peak joao 0.8 m/s    Left Ventricular Outflow Tract Mean Velocity 0.58 cm/s    Left Ventricular Outflow Tract Mean Gradient 1.45 mmHg    LA size 4.6 cm    Left Atrium Minor Axis 6.6 cm    Left Atrium Major Axis 7.5 cm    RA Major Axis 5.91 cm    AV mean gradient 2 mmHg    AV peak gradient 3 mmHg    Ao peak joao 0.8 m/s    Ao VTI 10.4 cm    LVOT peak VTI 12.5 cm    AV valve area 5.0 cm²    AV Velocity Ratio 1.00     AV index (prosthetic) 1.20     KJ by Velocity Ratio 4.2 cm²    Mr max joao 4.48 m/s    MV stenosis pressure 1/2 time 38.15 ms    MV valve area p 1/2 method 5.77 cm2    Triscuspid Valve Regurgitation Peak Gradient 27 mmHg    PV PEAK VELOCITY 0.58 m/s    PV peak gradient 1 mmHg    IVC diameter 2.60 cm    ZLVIDS 3.10     ZLVIDD 0.32     JAKI 67 mL/m2    LA Vol 132 cm3    LA WIDTH 4.8 cm    RA Width  5.2 cm    TV resting pulmonary artery pressure 42 mmHg    RV TB RVSP 18 mmHg    Est. RA pres 15 mmHg    EF 30 %   POCT glucose    Collection Time: 01/24/25  2:46 PM   Result Value Ref Range    POCT Glucose 203 (H) 70 - 110 mg/dL   APTT    Collection Time: 01/24/25  3:35 PM   Result Value Ref Range    aPTT 74.9 (H) 21.0 - 32.0 sec   POCT glucose    Collection Time: 01/24/25  7:50 PM   Result Value Ref Range    POCT Glucose 180 (H) 70 - 110 mg/dL     Current Imaging Results:    NM Lung Scan Ventilation Perfusion   Final Result      Low intermediate probability (20-50%) of acute pulmonary embolism.         Electronically signed by: Daniel Velazquez MD   Date:    01/24/2025   Time:    12:49      MRI Abdomen Pelvis Without Contrast (XPD)    (Results Pending)       4/15/2021: Cath:    The pre-procedure left ventricular end diastolic pressure was 12.  The ejection fraction was greater than 55% by visual estimate.  Patent stent in the mid LAD with 20% stenosis just distal to this. IFR was point 9 3  Circumflex with 20-30% mid stenosis IFR 0.99  Normal right coronary artery  Planned medical therapy continue amiodarone for ventricular tachycardia      8/5/2024: ECG: ST with APCs. RBBB. LAFB.        8/5/2024: Echo:  Left Ventricle: The left ventricle is normal in size. Moderately increased wall thickness. There is concentric hypertrophy. Moderate global hypokinesis present. There is moderately reduced systolic function. Ejection fraction by visual approximation is 35%. Grade II diastolic dysfunction. Elevated left ventricular filling pressure. Tissue Doppler velocity is reduced.  Right Ventricle: Normal right ventricular cavity size. Wall thickness is normal. Systolic function is normal.  Left Atrium: Left atrium is severely dilated.  Right Atrium: Right atrium is moderately dilated.  Aortic Valve: There is mild aortic valve sclerosis. Mildly restricted motion.  Mitral Valve: There is moderate regurgitation with a centrally  directed jet.  Tricuspid Valve: There is mild to moderate regurgitation with a centrally directed jet.  Pulmonary Artery: There is severe pulmonary hypertension. The estimated pulmonary artery systolic pressure is 79 mmHg.  IVC/SVC: Intermediate venous pressure at 8 mmHg.  Pericardium: There is a small posterior effusion.      1/24/2025: Echo:  Left Ventricle: The left ventricle is mildly dilated. Ventricular mass is normal. Normal wall thickness. Severe global hypokinesis present. There is severely reduced systolic function. Ejection fraction is approximately 30%. Grade I diastolic dysfunction.  Right Ventricle: Normal right ventricular cavity size. Wall thickness is normal. Right ventricle wall motion has global hypokinesis. Systolic function is moderately reduced.  Left Atrium: Left atrium is severely dilated.  Right Atrium: Right atrium is moderately dilated.  Aortic Valve: The aortic valve is a trileaflet valve. There is mild aortic valve sclerosis. There is mild aortic regurgitation with a centrally directed jet.  Mitral Valve: There is severe regurgitation with a centrally directed jet.  Tricuspid Valve: There is moderate to severe regurgitation with a centrally directed jet.  Pulmonary Artery: There is mild to moderate pulmonary hypertension. The estimated pulmonary artery systolic pressure is 42 mmHg.  IVC/SVC: Elevated venous pressure at 15 mmHg.  Pericardium: There is a small circumferential effusion. Left pleural effusion.      Assessment and Plan:     Active Diagnoses:    Diagnosis Date Noted POA    PRINCIPAL PROBLEM:  Acute chest pain [R07.9] 01/24/2025 Yes    Acute deep vein thrombosis (DVT) of left lower extremity [I82.402] 01/24/2025 Yes    Moderate malnutrition [E44.0] 10/03/2024 Yes    CKD stage 4 due to type 2 diabetes mellitus [E11.22, N18.4] 02/11/2021 Yes    Type 2 diabetes mellitus with diabetic polyneuropathy, without long-term current use of insulin [E11.42] 09/30/2020 Yes    Essential  hypertension [I10]  Yes    HLD (hyperlipidemia) [E78.5]  Yes      Problems Resolved During this Admission:     Assessment and Plan:    1. Heart Failure, Systolic, Acute on Chronic              8/5/2024: Echo: Normal left ventricular size and with moderate systolic dysfunction. EF 35%. Moderate LVH. Moderate diastolic dysfunction. Severely dilated LA. Moderate MR. SPAP 79 mmHg.   1/24/2025: Echo: Mildly dilated left ventricle with severe systolic dysfunction. EF 30%. Mild diastolic dysfunction. Severely dilated LA. Moderate RV dysfunction. Mild aortic valve sclerosis. Severe MR. Moderate to severe TR.  SPAP 42 mmHg. RA 15 mmHg. Small PE.   1/24/2025: Fluid overloaded in HF.   On metoprolol 25 mg Q24.              On furosemide 40 mg iv Q24.              Needs further diuresis.   Will be difficult to manage with advanced CKD.    2. Mitral Regurgitation   1/24/2025: Echo: Severe MR. Moderate to severe TR.  SPAP 42 mmHg.   Diuresis.     3. Coronary Artery Disease              4/15/2021: Cath: Nonobstructive disease with patent LAD stent.              Not on aspirin 81 mg Q24 as on apixiban.              Appears stable.    4. Chest Pain   1/24/2025: CP.   Appears atypical.    5. Atrial Fibrillation   Paroxysmal AF.   On apixiban.   On amiodarone.   No recent clinical recurrence.   Plan is ablation.    6. High Risk Medication   On amiodarone.    7. History of Atrial tachycardia   Underwent ablation.    8. Chronic Anticoagulation   On apixiban 5 mg Q12.    9. History of Deep Venous Trombosis of Lower Extremity   2024: Right leg DVT.   On apixiban.    10. Left Leg Thrombosis  1/24/2025: Asymptomatic thrombus in left greater saphenous vein.  On apixiban.  See no need for IVC Filter.            11. History of Syncope  7/5/2024: Syncope  Occurred after episode of diaphoresis in setting of testing  positive for COVID and having orthostatic hypotension.     12. Orthostatic Hypotension              On midodrine.  Used to be on  fludrocortisone 100 mcg Q24.     13. Hypertension              On metoprolol 50 mg Q24 and furosemide 40 mg Q24.     14. Hypercholesterolemia              On pravastatin 40 mg Q24.     15. Diabetes Mellitus, Type 2     16. Chronic Kidney Disease, Stage 4              8/8/2024: BUN/crea 45/2.6. eGFR 25.   1/24/2025: BUN/crea 54/2.6. eGFR 25.                 17. Prostate Cancer     18. Ileostomy     19. Crohn's Disease                  VTE Risk Mitigation (From admission, onward)           Ordered     heparin 25,000 units in dextrose 5% (100 units/ml) IV bolus from bag HIGH INTENSITY nomogram - [facility]  As needed (PRN)        Question:  Heparin Infusion Adjustment (DO NOT MODIFY ANSWER)  Answer:  \\[email or web address]\epic\Images\Pharmacy\HeparinInfusions\heparin HIGH INTENSITY nomogram for [facility] [id number]    01/24/25 0821     heparin 25,000 units in dextrose 5% (100 units/ml) IV bolus from bag HIGH INTENSITY nomogram - [facility]  As needed (PRN)        Question:  Heparin Infusion Adjustment (DO NOT MODIFY ANSWER)  Answer:  \\[email or web address]\epic\Images\Pharmacy\HeparinInfusions\heparin HIGH INTENSITY nomogram for [facility] [id number].pdf    01/24/25 0821     heparin 25,000 units in dextrose 5% (100 units/ml) IV bolus from bag HIGH INTENSITY nomogram - [facility]  Once        Question:  Heparin Infusion Adjustment (DO NOT MODIFY ANSWER)  Answer:  \\[email or web address]\epic\Images\Pharmacy\HeparinInfusions\heparin HIGH INTENSITY nomogram for [facility] [id number]    01/24/25 0821     heparin 25,000 units in dextrose 5% 250 mL (100 units/mL) infusion HIGH INTENSITY nomogram - [facility]  Continuous        Question:  Begin at (units/kg/hr)  Answer:  18    01/24/25 0821     Reason for No Pharmacological VTE Prophylaxis  Once        Question:  Reasons:  Answer:  Already adequately anticoagulated on oral Anticoagulants    01/24/25 0305     IP VTE HIGH RISK PATIENT  Once         01/24/25 0305     Place sequential compression device  Until discontinued         01/24/25 0305                     Thank you for your consult.    I will follow-up with patient. Please contact us if you have any additional questions.    Almaz Hernández MD  Cardiology   [facility] ([address])

## 2025-01-25 NOTE — PLAN OF CARE
Problem: Adult Inpatient Plan of Care  Goal: Plan of Care Review  Outcome: Progressing     Problem: Diabetes Comorbidity  Goal: Blood Glucose Level Within Targeted Range  Outcome: Progressing     Problem: Fall Injury Risk  Goal: Absence of Fall and Fall-Related Injury  Outcome: Progressing     Problem: VTE (Venous Thromboembolism)  Goal: Tissue Perfusion  Outcome: Progressing

## 2025-01-26 LAB
ALBUMIN SERPL BCP-MCNC: 2.4 G/DL (ref 3.5–5.2)
ALP SERPL-CCNC: 114 U/L (ref 40–150)
ALT SERPL W/O P-5'-P-CCNC: 12 U/L (ref 10–44)
ANION GAP SERPL CALC-SCNC: 11 MMOL/L (ref 8–16)
APTT PPP: 51.3 SEC (ref 21–32)
APTT PPP: 51.3 SEC (ref 21–32)
AST SERPL-CCNC: 12 U/L (ref 10–40)
BASOPHILS # BLD AUTO: 0.02 K/UL (ref 0–0.2)
BASOPHILS # BLD AUTO: 0.02 K/UL (ref 0–0.2)
BASOPHILS NFR BLD: 0.4 % (ref 0–1.9)
BASOPHILS NFR BLD: 0.4 % (ref 0–1.9)
BILIRUB SERPL-MCNC: 0.8 MG/DL (ref 0.1–1)
BUN SERPL-MCNC: 50 MG/DL (ref 8–23)
CALCIUM SERPL-MCNC: 8.4 MG/DL (ref 8.7–10.5)
CHLORIDE SERPL-SCNC: 102 MMOL/L (ref 95–110)
CO2 SERPL-SCNC: 23 MMOL/L (ref 23–29)
CREAT SERPL-MCNC: 2.4 MG/DL (ref 0.5–1.4)
DIFFERENTIAL METHOD BLD: ABNORMAL
DIFFERENTIAL METHOD BLD: ABNORMAL
EOSINOPHIL # BLD AUTO: 0.1 K/UL (ref 0–0.5)
EOSINOPHIL # BLD AUTO: 0.1 K/UL (ref 0–0.5)
EOSINOPHIL NFR BLD: 1.5 % (ref 0–8)
EOSINOPHIL NFR BLD: 1.5 % (ref 0–8)
ERYTHROCYTE [DISTWIDTH] IN BLOOD BY AUTOMATED COUNT: 20 % (ref 11.5–14.5)
ERYTHROCYTE [DISTWIDTH] IN BLOOD BY AUTOMATED COUNT: 20 % (ref 11.5–14.5)
EST. GFR  (NO RACE VARIABLE): 27 ML/MIN/1.73 M^2
GLUCOSE SERPL-MCNC: 90 MG/DL (ref 70–110)
HCT VFR BLD AUTO: 32.2 % (ref 40–54)
HCT VFR BLD AUTO: 32.2 % (ref 40–54)
HGB BLD-MCNC: 10.1 G/DL (ref 14–18)
HGB BLD-MCNC: 10.1 G/DL (ref 14–18)
IMM GRANULOCYTES # BLD AUTO: 0.01 K/UL (ref 0–0.04)
IMM GRANULOCYTES # BLD AUTO: 0.01 K/UL (ref 0–0.04)
IMM GRANULOCYTES NFR BLD AUTO: 0.2 % (ref 0–0.5)
IMM GRANULOCYTES NFR BLD AUTO: 0.2 % (ref 0–0.5)
LYMPHOCYTES # BLD AUTO: 0.9 K/UL (ref 1–4.8)
LYMPHOCYTES # BLD AUTO: 0.9 K/UL (ref 1–4.8)
LYMPHOCYTES NFR BLD: 18.9 % (ref 18–48)
LYMPHOCYTES NFR BLD: 18.9 % (ref 18–48)
MAGNESIUM SERPL-MCNC: 1.6 MG/DL (ref 1.6–2.6)
MCH RBC QN AUTO: 27.8 PG (ref 27–31)
MCH RBC QN AUTO: 27.8 PG (ref 27–31)
MCHC RBC AUTO-ENTMCNC: 31.4 G/DL (ref 32–36)
MCHC RBC AUTO-ENTMCNC: 31.4 G/DL (ref 32–36)
MCV RBC AUTO: 89 FL (ref 82–98)
MCV RBC AUTO: 89 FL (ref 82–98)
MONOCYTES # BLD AUTO: 0.3 K/UL (ref 0.3–1)
MONOCYTES # BLD AUTO: 0.3 K/UL (ref 0.3–1)
MONOCYTES NFR BLD: 7.3 % (ref 4–15)
MONOCYTES NFR BLD: 7.3 % (ref 4–15)
NEUTROPHILS # BLD AUTO: 3.3 K/UL (ref 1.8–7.7)
NEUTROPHILS # BLD AUTO: 3.3 K/UL (ref 1.8–7.7)
NEUTROPHILS NFR BLD: 71.7 % (ref 38–73)
NEUTROPHILS NFR BLD: 71.7 % (ref 38–73)
NRBC BLD-RTO: 0 /100 WBC
NRBC BLD-RTO: 0 /100 WBC
PHOSPHATE SERPL-MCNC: 3.3 MG/DL (ref 2.7–4.5)
PLATELET # BLD AUTO: 83 K/UL (ref 150–450)
PLATELET # BLD AUTO: 83 K/UL (ref 150–450)
PMV BLD AUTO: ABNORMAL FL (ref 9.2–12.9)
PMV BLD AUTO: ABNORMAL FL (ref 9.2–12.9)
POCT GLUCOSE: 100 MG/DL (ref 70–110)
POCT GLUCOSE: 125 MG/DL (ref 70–110)
POCT GLUCOSE: 170 MG/DL (ref 70–110)
POTASSIUM SERPL-SCNC: 4.2 MMOL/L (ref 3.5–5.1)
PROT SERPL-MCNC: 5.6 G/DL (ref 6–8.4)
RBC # BLD AUTO: 3.63 M/UL (ref 4.6–6.2)
RBC # BLD AUTO: 3.63 M/UL (ref 4.6–6.2)
SODIUM SERPL-SCNC: 136 MMOL/L (ref 136–145)
WBC # BLD AUTO: 4.54 K/UL (ref 3.9–12.7)
WBC # BLD AUTO: 4.54 K/UL (ref 3.9–12.7)

## 2025-01-26 PROCEDURE — 25000003 PHARM REV CODE 250: Mod: HCNC | Performed by: NURSE PRACTITIONER

## 2025-01-26 PROCEDURE — 94761 N-INVAS EAR/PLS OXIMETRY MLT: CPT | Mod: HCNC

## 2025-01-26 PROCEDURE — 83735 ASSAY OF MAGNESIUM: CPT | Mod: HCNC | Performed by: NURSE PRACTITIONER

## 2025-01-26 PROCEDURE — 85730 THROMBOPLASTIN TIME PARTIAL: CPT | Mod: 91,HCNC | Performed by: HOSPITALIST

## 2025-01-26 PROCEDURE — 84100 ASSAY OF PHOSPHORUS: CPT | Mod: HCNC | Performed by: NURSE PRACTITIONER

## 2025-01-26 PROCEDURE — 80053 COMPREHEN METABOLIC PANEL: CPT | Mod: HCNC | Performed by: NURSE PRACTITIONER

## 2025-01-26 PROCEDURE — 36415 COLL VENOUS BLD VENIPUNCTURE: CPT | Mod: HCNC | Performed by: HOSPITALIST

## 2025-01-26 PROCEDURE — 21400001 HC TELEMETRY ROOM: Mod: HCNC

## 2025-01-26 PROCEDURE — 63600175 PHARM REV CODE 636 W HCPCS: Mod: HCNC | Performed by: HOSPITALIST

## 2025-01-26 PROCEDURE — 85025 COMPLETE CBC W/AUTO DIFF WBC: CPT | Mod: HCNC | Performed by: NURSE PRACTITIONER

## 2025-01-26 PROCEDURE — 25000003 PHARM REV CODE 250: Mod: HCNC | Performed by: HOSPITALIST

## 2025-01-26 PROCEDURE — 99233 SBSQ HOSP IP/OBS HIGH 50: CPT | Mod: HCNC,,, | Performed by: INTERNAL MEDICINE

## 2025-01-26 RX ORDER — COLCHICINE 0.6 MG/1
0.6 TABLET, FILM COATED ORAL DAILY
Status: DISCONTINUED | OUTPATIENT
Start: 2025-01-26 | End: 2025-01-27

## 2025-01-26 RX ORDER — PREDNISONE 20 MG/1
20 TABLET ORAL DAILY
Status: COMPLETED | OUTPATIENT
Start: 2025-01-27 | End: 2025-01-28

## 2025-01-26 RX ORDER — BRIMONIDINE TARTRATE 2 MG/ML
1 SOLUTION/ DROPS OPHTHALMIC 2 TIMES DAILY
Status: DISCONTINUED | OUTPATIENT
Start: 2025-01-26 | End: 2025-01-30 | Stop reason: HOSPADM

## 2025-01-26 RX ORDER — PREDNISONE 10 MG/1
10 TABLET ORAL DAILY
Status: COMPLETED | OUTPATIENT
Start: 2025-01-29 | End: 2025-01-30

## 2025-01-26 RX ORDER — PREDNISONE 20 MG/1
20 TABLET ORAL 2 TIMES DAILY
Status: COMPLETED | OUTPATIENT
Start: 2025-01-26 | End: 2025-01-26

## 2025-01-26 RX ADMIN — POTASSIUM CHLORIDE 20 MEQ: 1500 TABLET, EXTENDED RELEASE ORAL at 10:01

## 2025-01-26 RX ADMIN — PREDNISONE 20 MG: 20 TABLET ORAL at 10:01

## 2025-01-26 RX ADMIN — ALLOPURINOL 300 MG: 300 TABLET ORAL at 10:01

## 2025-01-26 RX ADMIN — TAMSULOSIN HYDROCHLORIDE 0.4 MG: 0.4 CAPSULE ORAL at 10:01

## 2025-01-26 RX ADMIN — COLCHICINE 0.6 MG: 0.6 TABLET, FILM COATED ORAL at 10:01

## 2025-01-26 RX ADMIN — PRAVASTATIN SODIUM 40 MG: 20 TABLET ORAL at 10:01

## 2025-01-26 RX ADMIN — PREDNISONE 20 MG: 20 TABLET ORAL at 09:01

## 2025-01-26 RX ADMIN — SODIUM BICARBONATE 1300 MG: 650 TABLET ORAL at 09:01

## 2025-01-26 RX ADMIN — FERROUS SULFATE TAB 325 MG (65 MG ELEMENTAL FE) 1 EACH: 325 (65 FE) TAB at 10:01

## 2025-01-26 RX ADMIN — FAMOTIDINE 20 MG: 20 TABLET ORAL at 10:01

## 2025-01-26 RX ADMIN — METOPROLOL SUCCINATE 25 MG: 25 TABLET, EXTENDED RELEASE ORAL at 10:01

## 2025-01-26 RX ADMIN — AMIODARONE HYDROCHLORIDE 200 MG: 200 TABLET ORAL at 10:01

## 2025-01-26 RX ADMIN — FUROSEMIDE 40 MG: 10 INJECTION, SOLUTION INTRAMUSCULAR; INTRAVENOUS at 10:01

## 2025-01-26 RX ADMIN — AMIODARONE HYDROCHLORIDE 200 MG: 200 TABLET ORAL at 09:01

## 2025-01-26 RX ADMIN — HEPARIN SODIUM 16 UNITS/KG/HR: 10000 INJECTION, SOLUTION INTRAVENOUS at 02:01

## 2025-01-26 RX ADMIN — SODIUM BICARBONATE 1300 MG: 650 TABLET ORAL at 10:01

## 2025-01-26 RX ADMIN — BRIMONIDINE TARTRATE 1 DROP: 2 SOLUTION/ DROPS OPHTHALMIC at 09:01

## 2025-01-26 RX ADMIN — APIXABAN 5 MG: 2.5 TABLET, FILM COATED ORAL at 09:01

## 2025-01-26 RX ADMIN — APIXABAN 5 MG: 2.5 TABLET, FILM COATED ORAL at 10:01

## 2025-01-26 NOTE — PROGRESS NOTES
Problem: Diabetes  Goal: Achieves glycemic balance  Description: Goal is to maintain blood sugar within range with no episodes of hypoglycemia  Outcome: Monitoring/Evaluating progress  Goal: Verbalizes/demonstrates understanding of NEW diagnosis of diabetes and management  Description: Document on Patient Education Activity  Outcome: Monitoring/Evaluating progress  Goal: Verbalizes understanding of diabetes management including how to use HbA1C to evaluate status of blood sugar over time (Diabetes is NOT a new diagnosis)  Description: Diabetes Education  Outcome: Monitoring/Evaluating progress  Goal: Demonstrates ability to self-administer insulin  Description: Document on Patient Education Activity  Outcome: Monitoring/Evaluating progress     Problem: Skin Integrity Alteration  Goal: Skin remains intact with no new/deterioration of wound or pressure injury  Outcome: Monitoring/Evaluating progress  Goal: Participates in wound care activities  Outcome: Monitoring/Evaluating progress  Goal: Comfort optimized with pressure injury prevention strategies guided by patient/family preference. (Hospice)  Outcome: Monitoring/Evaluating progress  Goal: Wound care provided to promote comfort needs (Hospice)  Outcome: Monitoring/Evaluating progress     Problem: Impaired Physical Mobility  Goal: Functional status is maintained or returned to baseline during hospitalization  Outcome: Monitoring/Evaluating progress  Goal: Tolerates activity for discharge setting with no abnormal symptoms  Outcome: Monitoring/Evaluating progress     Problem: At Risk for Falls  Goal: Patient does not fall  Outcome: Monitoring/Evaluating progress  Goal: Patient takes action to control fall-related risks  Outcome: Monitoring/Evaluating progress     Problem: At Risk for Injury Due to Fall  Goal: Patient does not fall  Outcome: Monitoring/Evaluating progress  Goal: Takes action to control condition specific risks  Outcome: Monitoring/Evaluating  "[address] Medicine  Progress Note    Patient Name: Jarrett Charlton Jr.  MRN: [id number]  Patient Class: IP- Inpatient   Admission Date: 1/24/2025  Length of Stay: 2 days  Attending Physician: Darrell Cheema MD  Primary Care Provider: Tripp Mohan MD          Principal Problem:Acute deep vein thrombosis (DVT) of left lower extremity        HPI:  Per Abhi Canela, NP:    "The patient is a 74 year old male with a past medical history of CAD status post PCI to mid LAD in 2017, Hx of VT, Atrial tachycardia s/p AT ablation in 4/2024 and DVT on Eliquis, CHF with the EF of 35%, SBO, s/p colostomy, hypertension, hyperlipidemia, and DM2 who presented to the ED at [facility] with reports of cough, congestion and bilateral chest pain.  His chest pain increased with coughing and moving his body as well as taking a deep breath over the last few days. Patient states that the chest started on left side of his chest and went to the right side. He states it feels like an aching. He states that his easy to breathe through his mouth, but he feels like he cannot breathe through his nose. He feels that the swelling in his legs has worsened.  While in the ED, it was discovered that he has a DVT to the left lower extremity and decision was made to transfer to [facility] [place] by [facility] for possible IVC filter placement.    On arrival to [facility] [place] [place] by [facility], the patient had severe chest pain radiating into the center of the chest.  Reports worsened shortness of breath.  He will be admitted to [facility] [place], [place] by [facility] for further management of his acute chest pain and Cardiology consult."    Overview/Hospital Course:  Patient is a 74-year-old man with diabetes mellitus type 2 (last hemoglobin A1c 6.5 percent last month), coronary artery disease status post stent placement to left anterior descending artery in 2017, history of atrial fibrillation and atrial tachycardia and status post ablation in 4/20/2024, chronic kidney disease " progress  Goal: Verbalizes understanding of fall-related injury personal risks  Description: Document education using the patient education activity  Outcome: Monitoring/Evaluating progress     Problem: Pain  Goal: Acceptable pain level achieved/maintained at rest using appropriate pain scale for the patient  Outcome: Monitoring/Evaluating progress  Goal: Acceptable pain level achieved/maintained with activity using appropriate pain scale for the patient  Outcome: Monitoring/Evaluating progress  Goal: Acceptable pain level achieved/maintained without oversedation  Outcome: Monitoring/Evaluating progress      stage 4, history of deep vein thrombosis on apixaban, chronic systolic heart failure with ejection fraction 35% who presented [facility] with shortness of breath and chest pain (10/10).  Ultrasound of the lower extremity revealed nonocclusive deep vein thrombosis on the left greater saphenous vein.     Patient reports history of ulcerative colitis for which he underwent bowel resection with ileostomy in 1985 and had small bowel obstruction recently which resolved without surgical intervention.  Patient reports poor oral intake since his bowel obstruction but reports stool output from ileostomy.     Patient with prior history a deep vein thrombosis in the left external iliac vein, common femoral vein, and greater saphenous vein visualized on ultrasound on 8/14/2024.     Patient transferred to [facility] for higher level of care.    Patient treated with intravenous heparin infusion for anticoagulation.  Patient also treated with intravenous diuretic to treat decompensated heart failure.     Per out for acute coronary syndrome based on serial troponins.  Chest pain negative.  V/Q scan negative low probability for pulmonary embolism.    Interval History:  Breathing continues to improve.  Reports pain in the right hand.    Review of Systems   Constitutional:  Negative for chills and fever.   Respiratory:  Negative for shortness of breath.    Cardiovascular:  Negative for chest pain.   Gastrointestinal:  Negative for abdominal pain, constipation, diarrhea, nausea and vomiting.   Musculoskeletal:  Positive for arthralgias.     Objective:     Vital Signs (Most Recent):  Temp: 97.6 °F (36.4 °C) (01/26/25 1014)  Pulse: 78 (01/26/25 1531)  Resp: 17 (01/26/25 1531)  BP: 106/62 (01/26/25 1531)  SpO2: 99 % (01/26/25 1501) Vital Signs (24h Range):  Temp:  [97.4 °F (36.3 °C)-97.8 °F (36.6 °C)] 97.6 °F (36.4 °C)  Pulse:  [57-93] 78  Resp:  [17-21] 17  SpO2:  [94 %-100 %] 99 %  BP: ()/(55-85) 106/62      Weight: 73 kg (160 lb 15 oz)  Body mass index is 20.66 kg/m².    Intake/Output Summary (Last 24 hours) at 1/26/2025 1735  Last data filed at 1/26/2025 1557  Gross per 24 hour   Intake 420 ml   Output 820 ml   Net -400 ml         Physical Exam  Cardiovascular:      Rate and Rhythm: Normal rate and regular rhythm.      Heart sounds: Normal heart sounds. No murmur heard.     No friction rub. No gallop.   Pulmonary:      Effort: Pulmonary effort is normal. No respiratory distress.      Breath sounds: Rales present. No wheezing.   Abdominal:      General: Bowel sounds are normal. There is no distension.      Palpations: Abdomen is soft.      Tenderness: There is no abdominal tenderness.   Musculoskeletal:         General: Swelling present. No tenderness. Normal range of motion.      Right lower leg: Edema present.      Left lower leg: Edema present.   Skin:     General: Skin is warm and dry.      Coloration: Skin is not pale.      Findings: No erythema or rash.   Neurological:      Mental Status: He is alert and oriented to person, place, and time.             Significant Labs: All pertinent labs within the past 24 hours have been reviewed.    Significant Imaging: I have reviewed all pertinent imaging results/findings within the past 24 hours.    Assessment and Plan     * Acute deep vein thrombosis (DVT) of left lower extremity  Ultrasound shows nonocclusive DVT within the left greater saphenous vein.  Great saphenous vein technically a superficial vein therefore not really a deep vein thrombosis.  V/Q scan low probability for pulmonary embolism.  Overall did not feel that patient really failed treatment with the apixaban.  Light of increased risk of bleeding complication rivaroxaban compared to apixaban will continue with long-term anticoagulation with apixaban.  Discontinue heparin infusion.  Start apixaban today.    Acute on chronic combined systolic and diastolic heart failure  Improving with intravenous diuretic  therapy.  We will continue to achieve euvolemia.    Pancreatic lesion  Due to advanced kidney disease patient at risk for nephrogenic systemic fibrosis/nephrogenic fibrosing dermopathy.  MRI without contrast obtained showed no evidence for intra-abdominal solid mass.  Pancreatic cystic lesion noted.  Recommend outpatient follow-up for ERCP/EUS.    Essential hypertension  Reasonably controlled with current regimen.  Will continue with current regimen and continue to monitor.    Chronic kidney disease, stage IV (severe)  Creatine stable for now. BMP reviewed- noted Estimated Creatinine Clearance: 25.5 mL/min (A) (based on SCr of 2.6 mg/dL (H)). according to latest data. Based on current GFR, CKD stage is stage 4 - GFR 15-29.  Monitor UOP and serial BMP and adjust therapy as needed. Renally dose meds. Avoid nephrotoxic medications and procedures.    Pseudogout  Evidence of flare involving primarily her insulin right hand.  We will give short course of prednisone.  Restart colchicine.      Moderate malnutrition  Nutrition consulted. Most recent weight and BMI monitored-     Measurements:  Wt Readings from Last 1 Encounters:   01/24/25 73 kg (160 lb 15 oz)   Body mass index is 20.66 kg/m².    Patient has been screened and assessed by RD.    Malnutrition Type:  Context: chronic illness  Level: moderate    Malnutrition Characteristic Summary:  Weight Loss (Malnutrition): greater than 20% in 1 year  Energy Intake (Malnutrition): less than 75% for greater than or equal to 3 months    Interventions/Recommendations (treatment strategy):  1) Add Boost Glucose Control to every meal  2) Continue current diet and encourage intake of meals  3) Honor pts food preferences  4) RD to monitor and follow up    Type 2 diabetes mellitus  Reasonably controlled with current regimen.  Will continue with current regimen and continue to monitor.      VTE Risk Mitigation (From admission, onward)           Ordered     apixaban tablet 5 mg  2  times daily         01/26/25 0853     Reason for No Pharmacological VTE Prophylaxis  Once        Question:  Reasons:  Answer:  Already adequately anticoagulated on oral Anticoagulants    01/24/25 0305     IP VTE HIGH RISK PATIENT  Once         01/24/25 0305     Place sequential compression device  Until discontinued         01/24/25 0305                      Darrell Cheema MD  Department of Hospital Medicine   [facility] Surg ([address])

## 2025-01-26 NOTE — NURSING
Patient BP 90/55 and asymptomatic. Patient asking about midodrine medication. On call provider notified, NAY Heck, no new orders given. She states ' for now we will monitor unless he becomes symptomatic or MAP <65, and to let her know'.

## 2025-01-26 NOTE — PLAN OF CARE
Problem: Adult Inpatient Plan of Care  Goal: Plan of Care Review  Outcome: Progressing  Goal: Absence of Hospital-Acquired Illness or Injury  Outcome: Progressing  Goal: Optimal Comfort and Wellbeing  Outcome: Progressing  Goal: Readiness for Transition of Care  Outcome: Progressing     Problem: Diabetes Comorbidity  Goal: Blood Glucose Level Within Targeted Range  Outcome: Progressing     Problem: Fall Injury Risk  Goal: Absence of Fall and Fall-Related Injury  Outcome: Progressing     Problem: VTE (Venous Thromboembolism)  Goal: Tissue Perfusion  Outcome: Progressing

## 2025-01-26 NOTE — PROGRESS NOTES
[facility] ([address]  Cardiology  Progress Note    Patient Name: Jarrett Charlton Jr.  MRN: [id number]  Admission Date: 1/24/2025  Hospital Length of Stay: 2 days  Code Status: Full Code   Attending Physician: Darrell Cheema MD   Primary Care Physician: Tripp Mohan MD  Expected Discharge Date: 1/27/2025  Principal Problem:Acute deep vein thrombosis (DVT) of left lower extremity    Subjective:     Brief HPI:    Jarrett Charlton Jr. is a 74 y.o.male with hypertension, hypercholesterolemia and diabetes mellitus type 2. He has coronary artery disease having received a stent. His ascending aorta is mildly enlarged. He has issues with orthostatic hypotension for which he is on midodrine and used to be on fludrocortisone . He has undergone an ablation for atrial tachycardia. He has an implantable loop recorder. He has an ileostomy in the setting of Crohn's disease, has had prostate cancer and has advanced chronic kidney disease. He goes to the infusion center at [facility] to receive regular infusions. I saw him on 7/6/2024 after he presented to [facility] after having had syncope on 7/5/2024. He tested positive for COVID. I saw him again in 8/2024 as an inpatient when he presented with shortness of breath. In the fall of 2024 he had a deep venous trombosis in his right leg. He began apixiban. Lately, he has been evaluated for recurrence of atrial fibrillation. He began amiodarone. The plan is an ablation. The night of 1/23/2025 he presented to the emergency room at [facility] due to chest pain. His legs were edematous. He had a venous duplex that revealed a nonocclusive thrombus in the left greater saphenous vein. He was transferred to [facility] for his further care.    Hospital Course:     Heparin iv followed by apixiban.    Diuresis.    Interval History:     Remains weak.    Breathing easier.    No further CP.    Review of Systems    Constitutional: Positive for malaise/fatigue. Negative for chills and fever.   HENT:  Negative for nosebleeds.    Eyes:  Negative for vision loss in left eye and vision loss in right eye.   Cardiovascular:  Positive for leg swelling, orthopnea and palpitations. Negative for chest pain and paroxysmal nocturnal dyspnea.   Respiratory:  Positive for shortness of breath. Negative for cough, hemoptysis, sputum production and wheezing.    Hematologic/Lymphatic: Negative for bleeding problem. Does not bruise/bleed easily.   Skin:  Negative for color change and rash.   Musculoskeletal:  Negative for muscle weakness and myalgias.   Gastrointestinal:  Negative for abdominal pain, heartburn, hematemesis, hematochezia, melena, nausea and vomiting.   Genitourinary:  Negative for hematuria.   Neurological:  Positive for weakness. Negative for dizziness, focal weakness, headaches, light-headedness and vertigo.   Psychiatric/Behavioral:  Negative for altered mental status. The patient is not nervous/anxious.    Allergic/Immunologic: Negative for persistent infections.     Objective:     Vital Signs (Most Recent):  Temp: 97.6 °F (36.4 °C) (01/26/25 1014)  Pulse: 93 (01/26/25 1128)  Resp: (!) 21 (01/26/25 1116)  BP: 98/61 (01/26/25 1116)  SpO2: 99 % (01/26/25 1116) Vital Signs (24h Range):  Temp:  [97.4 °F (36.3 °C)-97.8 °F (36.6 °C)] 97.6 °F (36.4 °C)  Pulse:  [57-93] 93  Resp:  [18-21] 21  SpO2:  [94 %-100 %] 99 %  BP: ()/(55-85) 98/61     Weight: 73 kg (160 lb 15 oz)  Body mass index is 20.66 kg/m².    SpO2: 99 %         Intake/Output Summary (Last 24 hours) at 1/26/2025 1252  Last data filed at 1/26/2025 0625  Gross per 24 hour   Intake 600 ml   Output 520 ml   Net 80 ml       Lines/Drains/Airways       Drain  Duration                  Ileostomy 01/01/84 RUQ 81584 days              Peripheral Intravenous Line  Duration                  Peripheral IV - Single Lumen 01/23/25 20 G Anterior;Left Forearm 3 days                     Physical Exam  Constitutional:       General: He is not in acute distress.     Appearance: Normal appearance. He is well-developed. He is ill-appearing. He is not toxic-appearing or diaphoretic.   HENT:      Head: Normocephalic and atraumatic.      Nose: Nose normal.   Eyes:      General:         Right eye: No discharge.         Left eye: No discharge.      Conjunctiva/sclera:      Right eye: Right conjunctiva is not injected.      Left eye: Left conjunctiva is not injected.      Pupils: Pupils are equal.      Right eye: Pupil is round.      Left eye: Pupil is round.   Neck:      Thyroid: No thyromegaly.      Vascular: No carotid bruit or JVD.   Cardiovascular:      Rate and Rhythm: Normal rate and regular rhythm. No extrasystoles are present.     Chest Wall: PMI is not displaced.      Pulses:           Radial pulses are 2+ on the right side and 2+ on the left side.        Femoral pulses are 2+ on the right side and 2+ on the left side.       Dorsalis pedis pulses are 1+ on the right side and 1+ on the left side.        Posterior tibial pulses are 1+ on the right side and 1+ on the left side.      Heart sounds: S1 normal and S2 normal. Murmur heard.      High-pitched blowing holosystolic murmur is present with a grade of 2/6 at the apex.      No gallop.   Pulmonary:      Effort: Pulmonary effort is normal.      Breath sounds: Examination of the right-lower field reveals rales. Examination of the left-lower field reveals rales. Rales present.   Abdominal:      Palpations: Abdomen is soft.      Tenderness: There is no abdominal tenderness.   Musculoskeletal:      Cervical back: Neck supple.      Right lower leg: Swelling present. 3+ Edema present.      Left lower leg: Swelling present. 3+ Edema present.   Lymphadenopathy:      Head:      Right side of head: No submandibular adenopathy.      Left side of head: No submandibular adenopathy.      Cervical: No cervical adenopathy.   Skin:     General: Skin is warm  and dry.      Findings: No rash.      Nails: There is no clubbing.   Neurological:      General: No focal deficit present.      Mental Status: He is alert and oriented to person, place, and time. He is not disoriented.      Cranial Nerves: No cranial nerve deficit.   Psychiatric:         Attention and Perception: Attention normal.         Mood and Affect: Mood and affect normal.         Speech: Speech normal.         Behavior: Behavior normal.         Thought Content: Thought content normal.         Cognition and Memory: Cognition and memory normal.         Judgment: Judgment normal.         Current Medications:     allopurinoL  300 mg Oral Daily    amiodarone  200 mg Oral BID    apixaban  5 mg Oral BID    colchicine  0.6 mg Oral Daily    famotidine  20 mg Oral Daily    ferrous sulfate  1 tablet Oral Every other day    furosemide (LASIX) injection  40 mg Intravenous Daily    metoprolol succinate  25 mg Oral Daily    NON FORMULARY MEDICATION 1 drop  1 drop Ophthalmic BID    potassium chloride  20 mEq Oral Daily    pravastatin  40 mg Oral Daily    predniSONE  20 mg Oral BID    Followed by    [START ON 1/27/2025] predniSONE  20 mg Oral Daily    Followed by    [START ON 1/29/2025] predniSONE  10 mg Oral Daily    sodium bicarbonate  1,300 mg Oral BID    tamsulosin  0.4 mg Oral Daily     Current Laboratory Results:    Recent Results (from the past 24 hours)   POCT glucose    Collection Time: 01/25/25  4:46 PM   Result Value Ref Range    POCT Glucose 141 (H) 70 - 110 mg/dL   POCT glucose    Collection Time: 01/25/25  8:39 PM   Result Value Ref Range    POCT Glucose 218 (H) 70 - 110 mg/dL   Comprehensive Metabolic Panel (CMP)    Collection Time: 01/26/25  4:00 AM   Result Value Ref Range    Sodium 136 136 - 145 mmol/L    Potassium 4.2 3.5 - 5.1 mmol/L    Chloride 102 95 - 110 mmol/L    CO2 23 23 - 29 mmol/L    Glucose 90 70 - 110 mg/dL    BUN 50 (H) 8 - 23 mg/dL    Creatinine 2.4 (H) 0.5 - 1.4 mg/dL    Calcium 8.4 (L) 8.7 -  10.5 mg/dL    Total Protein 5.6 (L) 6.0 - 8.4 g/dL    Albumin 2.4 (L) 3.5 - 5.2 g/dL    Total Bilirubin 0.8 0.1 - 1.0 mg/dL    Alkaline Phosphatase 114 40 - 150 U/L    AST 12 10 - 40 U/L    ALT 12 10 - 44 U/L    eGFR 27 (A) >60 mL/min/1.73 m^2    Anion Gap 11 8 - 16 mmol/L   Magnesium    Collection Time: 01/26/25  4:00 AM   Result Value Ref Range    Magnesium 1.6 1.6 - 2.6 mg/dL   Phosphorus    Collection Time: 01/26/25  4:00 AM   Result Value Ref Range    Phosphorus 3.3 2.7 - 4.5 mg/dL   CBC with Automated Differential    Collection Time: 01/26/25  4:00 AM   Result Value Ref Range    WBC 4.54 3.90 - 12.70 K/uL    RBC 3.63 (L) 4.60 - 6.20 M/uL    Hemoglobin 10.1 (L) 14.0 - 18.0 g/dL    Hematocrit 32.2 (L) 40.0 - 54.0 %    MCV 89 82 - 98 fL    MCH 27.8 27.0 - 31.0 pg    MCHC 31.4 (L) 32.0 - 36.0 g/dL    RDW 20.0 (H) 11.5 - 14.5 %    Platelets 83 (L) 150 - 450 K/uL    MPV SEE COMMENT 9.2 - 12.9 fL    Immature Granulocytes 0.2 0.0 - 0.5 %    Gran # (ANC) 3.3 1.8 - 7.7 K/uL    Immature Grans (Abs) 0.01 0.00 - 0.04 K/uL    Lymph # 0.9 (L) 1.0 - 4.8 K/uL    Mono # 0.3 0.3 - 1.0 K/uL    Eos # 0.1 0.0 - 0.5 K/uL    Baso # 0.02 0.00 - 0.20 K/uL    nRBC 0 0 /100 WBC    Gran % 71.7 38.0 - 73.0 %    Lymph % 18.9 18.0 - 48.0 %    Mono % 7.3 4.0 - 15.0 %    Eosinophil % 1.5 0.0 - 8.0 %    Basophil % 0.4 0.0 - 1.9 %    Differential Method Automated    APTT    Collection Time: 01/26/25  4:00 AM   Result Value Ref Range    aPTT 51.3 (H) 21.0 - 32.0 sec   CBC auto differential    Collection Time: 01/26/25  4:00 AM   Result Value Ref Range    WBC 4.54 3.90 - 12.70 K/uL    RBC 3.63 (L) 4.60 - 6.20 M/uL    Hemoglobin 10.1 (L) 14.0 - 18.0 g/dL    Hematocrit 32.2 (L) 40.0 - 54.0 %    MCV 89 82 - 98 fL    MCH 27.8 27.0 - 31.0 pg    MCHC 31.4 (L) 32.0 - 36.0 g/dL    RDW 20.0 (H) 11.5 - 14.5 %    Platelets 83 (L) 150 - 450 K/uL    MPV SEE COMMENT 9.2 - 12.9 fL    Immature Granulocytes 0.2 0.0 - 0.5 %    Gran # (ANC) 3.3 1.8 - 7.7 K/uL     Immature Grans (Abs) 0.01 0.00 - 0.04 K/uL    Lymph # 0.9 (L) 1.0 - 4.8 K/uL    Mono # 0.3 0.3 - 1.0 K/uL    Eos # 0.1 0.0 - 0.5 K/uL    Baso # 0.02 0.00 - 0.20 K/uL    nRBC 0 0 /100 WBC    Gran % 71.7 38.0 - 73.0 %    Lymph % 18.9 18.0 - 48.0 %    Mono % 7.3 4.0 - 15.0 %    Eosinophil % 1.5 0.0 - 8.0 %    Basophil % 0.4 0.0 - 1.9 %    Differential Method Automated    APTT    Collection Time: 01/26/25  4:00 AM   Result Value Ref Range    aPTT 51.3 (H) 21.0 - 32.0 sec   POCT glucose    Collection Time: 01/26/25  7:30 AM   Result Value Ref Range    POCT Glucose 100 70 - 110 mg/dL   POCT glucose    Collection Time: 01/26/25 11:25 AM   Result Value Ref Range    POCT Glucose 125 (H) 70 - 110 mg/dL     Current Imaging Results:    MRI Abdomen Pelvis Without Contrast (XPD)   Final Result      1. No evidence for intra-abdominal solid mass, noting low sensitivity of noncontrast examination.   2. Nodular liver contour with trace perihepatic fluid suspicious for cirrhosis.   3. Pancreatic cystic lesion, similar to appearance on prior CT and suboptimally characterized on noncontrast assessment.  Recommend further evaluation with dedicated contrast enhanced MRI with MRCP.   4. Bilateral renal cysts.   5. Additional findings above.         Electronically signed by: Adin Ramirez MD   Date:    01/25/2025   Time:    08:54      NM Lung Scan Ventilation Perfusion   Final Result      Low intermediate probability (20-50%) of acute pulmonary embolism.         Electronically signed by: Daniel Velazquez MD   Date:    01/24/2025   Time:    12:49          4/15/2021: Cath:    The pre-procedure left ventricular end diastolic pressure was 12.  The ejection fraction was greater than 55% by visual estimate.  Patent stent in the mid LAD with 20% stenosis just distal to this. IFR was point 9 3  Circumflex with 20-30% mid stenosis IFR 0.99  Normal right coronary artery  Planned medical therapy continue amiodarone for ventricular tachycardia         8/5/2024: ECG: ST with APCs. RBBB. LAFB.        8/5/2024: Echo:  Left Ventricle: The left ventricle is normal in size. Moderately increased wall thickness. There is concentric hypertrophy. Moderate global hypokinesis present. There is moderately reduced systolic function. Ejection fraction by visual approximation is 35%. Grade II diastolic dysfunction. Elevated left ventricular filling pressure. Tissue Doppler velocity is reduced.  Right Ventricle: Normal right ventricular cavity size. Wall thickness is normal. Systolic function is normal.  Left Atrium: Left atrium is severely dilated.  Right Atrium: Right atrium is moderately dilated.  Aortic Valve: There is mild aortic valve sclerosis. Mildly restricted motion.  Mitral Valve: There is moderate regurgitation with a centrally directed jet.  Tricuspid Valve: There is mild to moderate regurgitation with a centrally directed jet.  Pulmonary Artery: There is severe pulmonary hypertension. The estimated pulmonary artery systolic pressure is 79 mmHg.  IVC/SVC: Intermediate venous pressure at 8 mmHg.  Pericardium: There is a small posterior effusion.       1/24/2025: Echo:  Left Ventricle: The left ventricle is mildly dilated. Ventricular mass is normal. Normal wall thickness. Severe global hypokinesis present. There is severely reduced systolic function. Ejection fraction is approximately 30%. Grade I diastolic dysfunction.  Right Ventricle: Normal right ventricular cavity size. Wall thickness is normal. Right ventricle wall motion has global hypokinesis. Systolic function is moderately reduced.  Left Atrium: Left atrium is severely dilated.  Right Atrium: Right atrium is moderately dilated.  Aortic Valve: The aortic valve is a trileaflet valve. There is mild aortic valve sclerosis. There is mild aortic regurgitation with a centrally directed jet.  Mitral Valve: There is severe regurgitation with a centrally directed jet.  Tricuspid Valve: There is moderate to severe  regurgitation with a centrally directed jet.  Pulmonary Artery: There is mild to moderate pulmonary hypertension. The estimated pulmonary artery systolic pressure is 42 mmHg.  IVC/SVC: Elevated venous pressure at 15 mmHg.  Pericardium: There is a small circumferential effusion. Left pleural effusion.    Assessment and Plan:     Problem List:    Active Diagnoses:    Diagnosis Date Noted POA    PRINCIPAL PROBLEM:  Acute deep vein thrombosis (DVT) of left lower extremity [I82.402] 01/24/2025 Yes    Pancreatic lesion [K86.9] 01/25/2025 Yes    Moderate malnutrition [E44.0] 10/03/2024 Yes    Acute on chronic combined systolic and diastolic heart failure [I50.43] 09/11/2024 Yes    Chronic kidney disease, stage IV (severe) [N18.4] 02/11/2021 Yes    Type 2 diabetes mellitus [E11.9] 09/30/2020 Yes    Essential hypertension [I10]  Yes      Problems Resolved During this Admission:    Diagnosis Date Noted Date Resolved POA    HLD (hyperlipidemia) [E78.5]  01/25/2025 Yes     Assessment and Plan:     1. Heart Failure, Systolic, Acute on Chronic              8/5/2024: Echo: Normal left ventricular size and with moderate systolic dysfunction. EF 35%. Moderate LVH. Moderate diastolic dysfunction. Severely dilated LA. Moderate MR. SPAP 79 mmHg.              1/24/2025: Echo: Mildly dilated left ventricle with severe systolic dysfunction. EF 30%. Mild diastolic dysfunction. Severely dilated LA. Moderate RV dysfunction. Mild aortic valve sclerosis. Severe MR. Moderate to severe TR.  SPAP 42 mmHg. RA 15 mmHg. Small PE.              1/24/2025: Fluid overloaded in HF. BNP 2,586.              On metoprolol 25 mg Q24.              On furosemide 40 mg iv Q24.              Needs further diuresis.              Will be difficult to manage with advanced CKD and issues with hypotension.     2. Mitral Regurgitation              1/24/2025: Echo: Severe MR. Moderate to severe TR. SPAP 42 mmHg.              Diuresis.     3. Coronary Artery Disease               4/15/2021: Cath: Nonobstructive disease with patent LAD stent.              Not on aspirin 81 mg Q24 as on apixiban.              Appears stable.     4. Chest Pain              1/24/2025: CP.              Appeared atypical.     5. Atrial Fibrillation              Paroxysmal AF.              On apixiban.              On metoprolol 25 mg Q24 and amiodarone 200 mg Q12.              No recent clinical recurrence.              Plan is ablation.     6. High Risk Medication              On amiodarone.     7. History of Atrial tachycardia              Underwent ablation.     8. Chronic Anticoagulation              On apixiban 5 mg Q12.     9. History of Deep Venous Trombosis of Lower Extremity              2024: Right leg DVT.              On apixiban 5 mg Q12.     10. Left Leg Thrombosis  1/24/2025: Asymptomatic thrombus in left greater saphenous vein.  On apixiban.            11. History of Syncope  7/5/2024: Syncope  Occurred after episode of diaphoresis in setting of testing  positive for COVID and having orthostatic hypotension.     12. Orthostatic Hypotension              On midodrine.  Used to be on fludrocortisone 100 mcg Q24.     13. Hypertension              At home was on metoprolol 25 mg Q24 and furosemide 40 mg Q24.     14. Hypercholesterolemia              On pravastatin 40 mg Q24.     15. Diabetes Mellitus, Type 2     16. Chronic Kidney Disease, Stage 4              8/8/2024: BUN/crea 45/2.6. eGFR 25.              1/24/2025: BUN/crea 54/2.6. eGFR 25.                 17. Prostate Cancer     18. Ileostomy     19. Crohn's Disease                VTE Risk Mitigation (From admission, onward)           Ordered     apixaban tablet 5 mg  2 times daily         01/26/25 0853     Reason for No Pharmacological VTE Prophylaxis  Once        Question:  Reasons:  Answer:  Already adequately anticoagulated on oral Anticoagulants    01/24/25 0305     IP VTE HIGH RISK PATIENT  Once         01/24/25 0305     Place  sequential compression device  Until discontinued         01/24/25 7491                    Almaz Hernández MD  Cardiology  [facility] ([address])

## 2025-01-26 NOTE — SUBJECTIVE & OBJECTIVE
Interval History:  Breathing continues to improve.  Reports pain in the right hand.    Review of Systems   Constitutional:  Negative for chills and fever.   Respiratory:  Negative for shortness of breath.    Cardiovascular:  Negative for chest pain.   Gastrointestinal:  Negative for abdominal pain, constipation, diarrhea, nausea and vomiting.   Musculoskeletal:  Positive for arthralgias.     Objective:     Vital Signs (Most Recent):  Temp: 97.6 °F (36.4 °C) (01/26/25 1014)  Pulse: 78 (01/26/25 1531)  Resp: 17 (01/26/25 1531)  BP: 106/62 (01/26/25 1531)  SpO2: 99 % (01/26/25 1501) Vital Signs (24h Range):  Temp:  [97.4 °F (36.3 °C)-97.8 °F (36.6 °C)] 97.6 °F (36.4 °C)  Pulse:  [57-93] 78  Resp:  [17-21] 17  SpO2:  [94 %-100 %] 99 %  BP: ()/(55-85) 106/62     Weight: 73 kg (160 lb 15 oz)  Body mass index is 20.66 kg/m².    Intake/Output Summary (Last 24 hours) at 1/26/2025 1735  Last data filed at 1/26/2025 1557  Gross per 24 hour   Intake 420 ml   Output 820 ml   Net -400 ml         Physical Exam  Cardiovascular:      Rate and Rhythm: Normal rate and regular rhythm.      Heart sounds: Normal heart sounds. No murmur heard.     No friction rub. No gallop.   Pulmonary:      Effort: Pulmonary effort is normal. No respiratory distress.      Breath sounds: Rales present. No wheezing.   Abdominal:      General: Bowel sounds are normal. There is no distension.      Palpations: Abdomen is soft.      Tenderness: There is no abdominal tenderness.   Musculoskeletal:         General: Swelling present. No tenderness. Normal range of motion.      Right lower leg: Edema present.      Left lower leg: Edema present.   Skin:     General: Skin is warm and dry.      Coloration: Skin is not pale.      Findings: No erythema or rash.   Neurological:      Mental Status: He is alert and oriented to person, place, and time.             Significant Labs: All pertinent labs within the past 24 hours have been reviewed.    Significant  Imaging: I have reviewed all pertinent imaging results/findings within the past 24 hours.

## 2025-01-27 LAB
ANION GAP SERPL CALC-SCNC: 12 MMOL/L (ref 8–16)
APTT PPP: 34.2 SEC (ref 21–32)
BASOPHILS # BLD AUTO: 0 K/UL (ref 0–0.2)
BASOPHILS NFR BLD: 0 % (ref 0–1.9)
BUN SERPL-MCNC: 63 MG/DL (ref 8–23)
CALCIUM SERPL-MCNC: 9 MG/DL (ref 8.7–10.5)
CHLORIDE SERPL-SCNC: 98 MMOL/L (ref 95–110)
CO2 SERPL-SCNC: 24 MMOL/L (ref 23–29)
CREAT SERPL-MCNC: 2.8 MG/DL (ref 0.5–1.4)
DIFFERENTIAL METHOD BLD: ABNORMAL
EOSINOPHIL # BLD AUTO: 0 K/UL (ref 0–0.5)
EOSINOPHIL NFR BLD: 0 % (ref 0–8)
ERYTHROCYTE [DISTWIDTH] IN BLOOD BY AUTOMATED COUNT: 19.9 % (ref 11.5–14.5)
EST. GFR  (NO RACE VARIABLE): 23 ML/MIN/1.73 M^2
GLUCOSE SERPL-MCNC: 168 MG/DL (ref 70–110)
HCT VFR BLD AUTO: 34.3 % (ref 40–54)
HGB BLD-MCNC: 10.7 G/DL (ref 14–18)
IMM GRANULOCYTES # BLD AUTO: 0.02 K/UL (ref 0–0.04)
IMM GRANULOCYTES NFR BLD AUTO: 0.5 % (ref 0–0.5)
LYMPHOCYTES # BLD AUTO: 0.3 K/UL (ref 1–4.8)
LYMPHOCYTES NFR BLD: 8.2 % (ref 18–48)
MAGNESIUM SERPL-MCNC: 1.7 MG/DL (ref 1.6–2.6)
MCH RBC QN AUTO: 27.6 PG (ref 27–31)
MCHC RBC AUTO-ENTMCNC: 31.2 G/DL (ref 32–36)
MCV RBC AUTO: 88 FL (ref 82–98)
MONOCYTES # BLD AUTO: 0.2 K/UL (ref 0.3–1)
MONOCYTES NFR BLD: 3.6 % (ref 4–15)
NEUTROPHILS # BLD AUTO: 3.7 K/UL (ref 1.8–7.7)
NEUTROPHILS NFR BLD: 87.7 % (ref 38–73)
NRBC BLD-RTO: 0 /100 WBC
PHOSPHATE SERPL-MCNC: 4.2 MG/DL (ref 2.7–4.5)
PLATELET # BLD AUTO: 101 K/UL (ref 150–450)
PMV BLD AUTO: ABNORMAL FL (ref 9.2–12.9)
POCT GLUCOSE: 168 MG/DL (ref 70–110)
POCT GLUCOSE: 176 MG/DL (ref 70–110)
POCT GLUCOSE: 187 MG/DL (ref 70–110)
POCT GLUCOSE: 195 MG/DL (ref 70–110)
POCT GLUCOSE: 254 MG/DL (ref 70–110)
POTASSIUM SERPL-SCNC: 5.1 MMOL/L (ref 3.5–5.1)
RBC # BLD AUTO: 3.88 M/UL (ref 4.6–6.2)
SODIUM SERPL-SCNC: 134 MMOL/L (ref 136–145)
WBC # BLD AUTO: 4.17 K/UL (ref 3.9–12.7)

## 2025-01-27 PROCEDURE — 36415 COLL VENOUS BLD VENIPUNCTURE: CPT | Mod: HCNC | Performed by: HOSPITALIST

## 2025-01-27 PROCEDURE — 63600175 PHARM REV CODE 636 W HCPCS: Mod: HCNC | Performed by: NURSE PRACTITIONER

## 2025-01-27 PROCEDURE — 25000003 PHARM REV CODE 250: Mod: HCNC | Performed by: NURSE PRACTITIONER

## 2025-01-27 PROCEDURE — 25000003 PHARM REV CODE 250: Mod: HCNC | Performed by: HOSPITALIST

## 2025-01-27 PROCEDURE — 83735 ASSAY OF MAGNESIUM: CPT | Mod: HCNC | Performed by: HOSPITALIST

## 2025-01-27 PROCEDURE — 21400001 HC TELEMETRY ROOM: Mod: HCNC

## 2025-01-27 PROCEDURE — 99233 SBSQ HOSP IP/OBS HIGH 50: CPT | Mod: HCNC,,, | Performed by: INTERNAL MEDICINE

## 2025-01-27 PROCEDURE — 80048 BASIC METABOLIC PNL TOTAL CA: CPT | Mod: HCNC | Performed by: HOSPITALIST

## 2025-01-27 PROCEDURE — 63600175 PHARM REV CODE 636 W HCPCS: Mod: HCNC | Performed by: HOSPITALIST

## 2025-01-27 PROCEDURE — 84100 ASSAY OF PHOSPHORUS: CPT | Mod: HCNC | Performed by: HOSPITALIST

## 2025-01-27 PROCEDURE — 85025 COMPLETE CBC W/AUTO DIFF WBC: CPT | Mod: HCNC | Performed by: HOSPITALIST

## 2025-01-27 RX ORDER — COLCHICINE 0.6 MG/1
0.6 TABLET, FILM COATED ORAL EVERY OTHER DAY
Status: DISCONTINUED | OUTPATIENT
Start: 2025-01-29 | End: 2025-01-30 | Stop reason: HOSPADM

## 2025-01-27 RX ORDER — FAMOTIDINE 20 MG/1
20 TABLET, FILM COATED ORAL
Status: DISCONTINUED | OUTPATIENT
Start: 2025-01-29 | End: 2025-01-30 | Stop reason: HOSPADM

## 2025-01-27 RX ADMIN — FUROSEMIDE 40 MG: 10 INJECTION, SOLUTION INTRAMUSCULAR; INTRAVENOUS at 08:01

## 2025-01-27 RX ADMIN — PRAVASTATIN SODIUM 40 MG: 20 TABLET ORAL at 08:01

## 2025-01-27 RX ADMIN — METOPROLOL SUCCINATE 25 MG: 25 TABLET, EXTENDED RELEASE ORAL at 08:01

## 2025-01-27 RX ADMIN — TAMSULOSIN HYDROCHLORIDE 0.4 MG: 0.4 CAPSULE ORAL at 08:01

## 2025-01-27 RX ADMIN — SODIUM BICARBONATE 1300 MG: 650 TABLET ORAL at 08:01

## 2025-01-27 RX ADMIN — PREDNISONE 20 MG: 20 TABLET ORAL at 08:01

## 2025-01-27 RX ADMIN — POTASSIUM CHLORIDE 20 MEQ: 1500 TABLET, EXTENDED RELEASE ORAL at 08:01

## 2025-01-27 RX ADMIN — APIXABAN 5 MG: 2.5 TABLET, FILM COATED ORAL at 08:01

## 2025-01-27 RX ADMIN — AMIODARONE HYDROCHLORIDE 200 MG: 200 TABLET ORAL at 09:01

## 2025-01-27 RX ADMIN — ALLOPURINOL 300 MG: 300 TABLET ORAL at 08:01

## 2025-01-27 RX ADMIN — COLCHICINE 0.6 MG: 0.6 TABLET, FILM COATED ORAL at 08:01

## 2025-01-27 RX ADMIN — BRIMONIDINE TARTRATE 1 DROP: 2 SOLUTION/ DROPS OPHTHALMIC at 09:01

## 2025-01-27 RX ADMIN — AMIODARONE HYDROCHLORIDE 200 MG: 200 TABLET ORAL at 08:01

## 2025-01-27 RX ADMIN — FAMOTIDINE 20 MG: 20 TABLET ORAL at 08:01

## 2025-01-27 RX ADMIN — SODIUM BICARBONATE 1300 MG: 650 TABLET ORAL at 09:01

## 2025-01-27 RX ADMIN — APIXABAN 5 MG: 2.5 TABLET, FILM COATED ORAL at 09:01

## 2025-01-27 RX ADMIN — INSULIN ASPART 1 UNITS: 100 INJECTION, SOLUTION INTRAVENOUS; SUBCUTANEOUS at 10:01

## 2025-01-27 RX ADMIN — BRIMONIDINE TARTRATE 1 DROP: 2 SOLUTION/ DROPS OPHTHALMIC at 08:01

## 2025-01-27 NOTE — SUBJECTIVE & OBJECTIVE
Interval History:  Breathing continues to improve.  Reports pain in the right hand improved.    Review of Systems   Constitutional:  Negative for chills and fever.   Respiratory:  Negative for shortness of breath.    Cardiovascular:  Negative for chest pain.   Gastrointestinal:  Negative for abdominal pain, constipation, diarrhea, nausea and vomiting.   Musculoskeletal:  Positive for arthralgias.     Objective:     Vital Signs (Most Recent):  Temp: 97.8 °F (36.6 °C) (01/27/25 0751)  Pulse: 72 (01/27/25 1453)  Resp: 18 (01/27/25 0751)  BP: 95/61 (01/27/25 0751)  SpO2: 98 % (01/27/25 0751) Vital Signs (24h Range):  Temp:  [96.7 °F (35.9 °C)-97.8 °F (36.6 °C)] 97.8 °F (36.6 °C)  Pulse:  [] 72  Resp:  [15-24] 18  SpO2:  [96 %-99 %] 98 %  BP: ()/(57-64) 95/61     Weight: 73 kg (160 lb 15 oz)  Body mass index is 20.66 kg/m².    Intake/Output Summary (Last 24 hours) at 1/27/2025 1709  Last data filed at 1/27/2025 1454  Gross per 24 hour   Intake 660 ml   Output 550 ml   Net 110 ml         Physical Exam  Cardiovascular:      Rate and Rhythm: Normal rate and regular rhythm.      Heart sounds: Normal heart sounds. No murmur heard.     No friction rub. No gallop.   Pulmonary:      Effort: Pulmonary effort is normal. No respiratory distress.      Breath sounds: Rales present. No wheezing.   Abdominal:      General: Bowel sounds are normal. There is no distension.      Palpations: Abdomen is soft.      Tenderness: There is no abdominal tenderness.   Musculoskeletal:         General: Swelling present. No tenderness. Normal range of motion.      Right lower leg: Edema present.      Left lower leg: Edema present.   Skin:     General: Skin is warm and dry.      Coloration: Skin is not pale.      Findings: No erythema or rash.   Neurological:      Mental Status: He is alert and oriented to person, place, and time.             Significant Labs: All pertinent labs within the past 24 hours have been reviewed.    Significant  Imaging: I have reviewed all pertinent imaging results/findings within the past 24 hours.

## 2025-01-27 NOTE — PHYSICIAN QUERY
Please clarify the conflicting documentation.        Acute on chronic combined systolic and diastolic heart failure

## 2025-01-27 NOTE — PLAN OF CARE
MOON Message    Medicare Outpatient and Observation Notification regarding financial responsibilityExplained to patient/caregiver; Signed/date by patient/caregiverDate THOMAS was signed1/27/2025Time THOMAS was [id number]

## 2025-01-27 NOTE — PLAN OF CARE
Problem: Adult Inpatient Plan of Care  Goal: Plan of Care Review  Outcome: Progressing  Goal: Patient-Specific Goal (Individualized)  Outcome: Progressing  Goal: Absence of Hospital-Acquired Illness or Injury  Outcome: Progressing  Goal: Optimal Comfort and Wellbeing  Outcome: Progressing  Goal: Readiness for Transition of Care  Outcome: Progressing     Problem: Diabetes Comorbidity  Goal: Blood Glucose Level Within Targeted Range  Outcome: Progressing     Problem: Wound  Goal: Optimal Coping  Outcome: Progressing  Goal: Optimal Functional Ability  Outcome: Progressing  Goal: Absence of Infection Signs and Symptoms  Outcome: Progressing  Goal: Improved Oral Intake  Outcome: Progressing  Goal: Optimal Pain Control and Function  Outcome: Progressing  Goal: Skin Health and Integrity  Outcome: Progressing  Goal: Optimal Wound Healing  Outcome: Progressing     Problem: Fall Injury Risk  Goal: Absence of Fall and Fall-Related Injury  Outcome: Progressing     Problem: VTE (Venous Thromboembolism)  Goal: Tissue Perfusion  Outcome: Progressing

## 2025-01-27 NOTE — PROGRESS NOTES
"[address] Medicine  Progress Note    Patient Name: Jarrett Charlton Jr.  MRN: [id number]  Patient Class: IP- Inpatient   Admission Date: 1/24/2025  Length of Stay: 3 days  Attending Physician: Darrell Cheema MD  Primary Care Provider: Tripp Mohan MD        Subjective     Principal Problem:Acute deep vein thrombosis (DVT) of left lower extremity        HPI:  Per Abhi Canela NP:    "The patient is a 74 year old male with a past medical history of CAD status post PCI to mid LAD in 2017, Hx of VT, Atrial tachycardia s/p AT ablation in 4/2024 and DVT on Eliquis, CHF with the EF of 35%, SBO, s/p colostomy, hypertension, hyperlipidemia, and DM2 who presented to the ED at [facility] with reports of cough, congestion and bilateral chest pain.  His chest pain increased with coughing and moving his body as well as taking a deep breath over the last few days. Patient states that the chest started on left side of his chest and went to the right side. He states it feels like an aching. He states that his easy to breathe through his mouth, but he feels like he cannot breathe through his nose. He feels that the swelling in his legs has worsened.  While in the ED, it was discovered that he has a DVT to the left lower extremity and decision was made to transfer to [facility] for possible IVC filter placement.    On arrival to [facility], the patient had severe chest pain radiating into the center of the chest.  Reports worsened shortness of breath.  He will be admitted to [facility] for further management of his acute chest pain and Cardiology consult."    Overview/Hospital Course:  Patient is a 74-year-old man with diabetes mellitus type 2 (last hemoglobin A1c 6.5 percent last month), coronary artery disease status post stent placement to left anterior descending artery in 2017, history of atrial fibrillation and atrial tachycardia and status post ablation in 4/20/2024, chronic " kidney disease stage 4, history of deep vein thrombosis on apixaban, chronic systolic heart failure with ejection fraction 35% who presented [facility] with shortness of breath and chest pain (10/10).  Ultrasound of the lower extremity revealed nonocclusive deep vein thrombosis on the left greater saphenous vein.     Patient reports history of ulcerative colitis for which he underwent bowel resection with ileostomy in 1985 and had small bowel obstruction recently which resolved without surgical intervention.  Patient reports poor oral intake since his bowel obstruction but reports stool output from ileostomy.     Patient with prior history a deep vein thrombosis in the left external iliac vein, common femoral vein, and greater saphenous vein visualized on ultrasound on 8/14/2024.     Patient transferred to [facility] for higher level of care.    Patient treated with intravenous heparin infusion for anticoagulation.  Patient also treated with intravenous diuretic to treat decompensated heart failure.     Per out for acute coronary syndrome based on serial troponins.  Chest pain negative.  V/Q scan negative low probability for pulmonary embolism.    Interval History:  Breathing continues to improve.  Reports pain in the right hand improved.    Review of Systems   Constitutional:  Negative for chills and fever.   Respiratory:  Negative for shortness of breath.    Cardiovascular:  Negative for chest pain.   Gastrointestinal:  Negative for abdominal pain, constipation, diarrhea, nausea and vomiting.   Musculoskeletal:  Positive for arthralgias.     Objective:     Vital Signs (Most Recent):  Temp: 97.8 °F (36.6 °C) (01/27/25 0751)  Pulse: 72 (01/27/25 1453)  Resp: 18 (01/27/25 0751)  BP: 95/61 (01/27/25 0751)  SpO2: 98 % (01/27/25 0751) Vital Signs (24h Range):  Temp:  [96.7 °F (35.9 °C)-97.8 °F (36.6 °C)] 97.8 °F (36.6 °C)  Pulse:  [] 72  Resp:  [15-24] 18  SpO2:  [96 %-99 %] 98 %  BP:  ()/(57-64) 95/61     Weight: 73 kg (160 lb 15 oz)  Body mass index is 20.66 kg/m².    Intake/Output Summary (Last 24 hours) at 1/27/2025 1709  Last data filed at 1/27/2025 1454  Gross per 24 hour   Intake 660 ml   Output 550 ml   Net 110 ml         Physical Exam  Cardiovascular:      Rate and Rhythm: Normal rate and regular rhythm.      Heart sounds: Normal heart sounds. No murmur heard.     No friction rub. No gallop.   Pulmonary:      Effort: Pulmonary effort is normal. No respiratory distress.      Breath sounds: Rales present. No wheezing.   Abdominal:      General: Bowel sounds are normal. There is no distension.      Palpations: Abdomen is soft.      Tenderness: There is no abdominal tenderness.   Musculoskeletal:         General: Swelling present. No tenderness. Normal range of motion.      Right lower leg: Edema present.      Left lower leg: Edema present.   Skin:     General: Skin is warm and dry.      Coloration: Skin is not pale.      Findings: No erythema or rash.   Neurological:      Mental Status: He is alert and oriented to person, place, and time.             Significant Labs: All pertinent labs within the past 24 hours have been reviewed.    Significant Imaging: I have reviewed all pertinent imaging results/findings within the past 24 hours.    Assessment and Plan     * Acute deep vein thrombosis (DVT) of left lower extremity  Ultrasound shows nonocclusive DVT within the left greater saphenous vein.  Great saphenous vein technically a superficial vein therefore not really a deep vein thrombosis.  V/Q scan low probability for pulmonary embolism.  Overall did not feel that patient really failed treatment with the apixaban.  Light of increased risk of bleeding complication rivaroxaban compared to apixaban will continue with long-term anticoagulation with apixaban.  Discontinue heparin infusion.  Started apixaban 1/26/2025.  Continue apixaban.    Acute on chronic combined systolic and diastolic  heart failure  Improving with intravenous diuretic therapy.  We will continue to achieve euvolemia.    Pancreatic lesion  Due to advanced kidney disease patient at risk for nephrogenic systemic fibrosis/nephrogenic fibrosing dermopathy.  MRI without contrast obtained showed no evidence for intra-abdominal solid mass.  Pancreatic cystic lesion noted.  Recommend outpatient follow-up for ERCP/EUS.    Essential hypertension  Reasonably controlled with current regimen.  Will continue with current regimen and continue to monitor.    Chronic kidney disease, stage IV (severe)  Creatine stable for now. BMP reviewed- noted Estimated Creatinine Clearance: 25.5 mL/min (A) (based on SCr of 2.6 mg/dL (H)). according to latest data. Based on current GFR, CKD stage is stage 4 - GFR 15-29.  Monitor UOP and serial BMP and adjust therapy as needed. Renally dose meds. Avoid nephrotoxic medications and procedures.    Pseudogout  Evidence of flare involving primarily her insulin right hand.  Treating with short course of prednisone and every other day colchicine.  Improving.      Moderate malnutrition  Nutrition consulted. Most recent weight and BMI monitored-     Measurements:  Wt Readings from Last 1 Encounters:   01/24/25 73 kg (160 lb 15 oz)   Body mass index is 20.66 kg/m².    Patient has been screened and assessed by RD.    Malnutrition Type:  Context: chronic illness  Level: moderate    Malnutrition Characteristic Summary:  Weight Loss (Malnutrition): greater than 20% in 1 year  Energy Intake (Malnutrition): less than 75% for greater than or equal to 3 months    Interventions/Recommendations (treatment strategy):  1) Add Boost Glucose Control to every meal  2) Continue current diet and encourage intake of meals  3) Honor pts food preferences  4) RD to monitor and follow up    Type 2 diabetes mellitus  Reasonably controlled with current regimen.  Will continue with current regimen and continue to monitor.      VTE Risk Mitigation  (From admission, onward)           Ordered     apixaban tablet 5 mg  2 times daily         01/26/25 0853     Reason for No Pharmacological VTE Prophylaxis  Once        Question:  Reasons:  Answer:  Already adequately anticoagulated on oral Anticoagulants    01/24/25 0305     IP VTE HIGH RISK PATIENT  Once         01/24/25 0305     Place sequential compression device  Until discontinued         01/24/25 0305                    Discharge Planning   JOSÉ MIGUEL: 1/29/2025     Code Status: Full Code   Medical Readiness for Discharge Date:   Discharge Plan A: Home                        Darrell Cheema MD  Department of Hospital Medicine   [facility] ([address])

## 2025-01-27 NOTE — PROGRESS NOTES
Pharmacist Renal Dose Adjustment Note    Jarrett Charlton Jr. is a 74 y.o. male being treated with the medication famotidine    Patient Data:    Vital Signs (Most Recent):  Temp: 97.8 °F (36.6 °C) (01/27/25 0751)  Pulse: 82 (01/27/25 1103)  Resp: 18 (01/27/25 0751)  BP: 95/61 (01/27/25 0751)  SpO2: 98 % (01/27/25 0751) Vital Signs (72h Range):  Temp:  [96.7 °F (35.9 °C)-97.9 °F (36.6 °C)]   Pulse:  []   Resp:  [15-24]   BP: ()/(55-85)   SpO2:  [94 %-100 %]      Recent Labs   Lab 01/25/25  0139 01/26/25  0400 01/27/25  1054   CREATININE 2.5* 2.4* 2.8*     Serum creatinine: 2.8 mg/dL (H) 01/27/25 1054  Estimated creatinine clearance: 23.5 mL/min (A)    Medication: famotidine dose: 20 mg frequency: daily will be changed to medication: famotidine dose: 20 mg frequency: q48h    Pharmacist's Name: Vi Mishra  Pharmacist's Extension: [id number]

## 2025-01-28 ENCOUNTER — TELEPHONE (OUTPATIENT)
Dept: GASTROENTEROLOGY | Facility: CLINIC | Age: 75
End: 2025-01-28
Payer: MEDICARE

## 2025-01-28 PROBLEM — N17.9 ACUTE RENAL FAILURE SUPERIMPOSED ON STAGE 4 CHRONIC KIDNEY DISEASE: Status: ACTIVE | Noted: 2021-02-11

## 2025-01-28 PROBLEM — D69.6 THROMBOCYTOPENIA: Status: ACTIVE | Noted: 2025-01-28

## 2025-01-28 LAB
ANION GAP SERPL CALC-SCNC: 16 MMOL/L (ref 8–16)
BASOPHILS # BLD AUTO: 0.01 K/UL (ref 0–0.2)
BASOPHILS NFR BLD: 0.2 % (ref 0–1.9)
BUN SERPL-MCNC: 63 MG/DL (ref 8–23)
CALCIUM SERPL-MCNC: 9 MG/DL (ref 8.7–10.5)
CHLORIDE SERPL-SCNC: 99 MMOL/L (ref 95–110)
CO2 SERPL-SCNC: 19 MMOL/L (ref 23–29)
CREAT SERPL-MCNC: 3.2 MG/DL (ref 0.5–1.4)
DIFFERENTIAL METHOD BLD: ABNORMAL
EOSINOPHIL # BLD AUTO: 0 K/UL (ref 0–0.5)
EOSINOPHIL NFR BLD: 0 % (ref 0–8)
ERYTHROCYTE [DISTWIDTH] IN BLOOD BY AUTOMATED COUNT: 19.9 % (ref 11.5–14.5)
EST. GFR  (NO RACE VARIABLE): 19 ML/MIN/1.73 M^2
GLUCOSE SERPL-MCNC: 159 MG/DL (ref 70–110)
HCT VFR BLD AUTO: 36.1 % (ref 40–54)
HGB BLD-MCNC: 11.4 G/DL (ref 14–18)
IMM GRANULOCYTES # BLD AUTO: 0.02 K/UL (ref 0–0.04)
IMM GRANULOCYTES NFR BLD AUTO: 0.4 % (ref 0–0.5)
LYMPHOCYTES # BLD AUTO: 0.6 K/UL (ref 1–4.8)
LYMPHOCYTES NFR BLD: 10 % (ref 18–48)
MAGNESIUM SERPL-MCNC: 1.8 MG/DL (ref 1.6–2.6)
MCH RBC QN AUTO: 27.7 PG (ref 27–31)
MCHC RBC AUTO-ENTMCNC: 31.6 G/DL (ref 32–36)
MCV RBC AUTO: 88 FL (ref 82–98)
MONOCYTES # BLD AUTO: 0.4 K/UL (ref 0.3–1)
MONOCYTES NFR BLD: 6.2 % (ref 4–15)
NEUTROPHILS # BLD AUTO: 4.7 K/UL (ref 1.8–7.7)
NEUTROPHILS NFR BLD: 83.2 % (ref 38–73)
NRBC BLD-RTO: 0 /100 WBC
OHS CV AF BURDEN PERCENT: 16
OHS CV DC REMOTE DEVICE TYPE: NORMAL
PHOSPHATE SERPL-MCNC: 5 MG/DL (ref 2.7–4.5)
PLATELET # BLD AUTO: 113 K/UL (ref 150–450)
PMV BLD AUTO: ABNORMAL FL (ref 9.2–12.9)
POCT GLUCOSE: 126 MG/DL (ref 70–110)
POCT GLUCOSE: 150 MG/DL (ref 70–110)
POCT GLUCOSE: 175 MG/DL (ref 70–110)
POTASSIUM SERPL-SCNC: 5.2 MMOL/L (ref 3.5–5.1)
RBC # BLD AUTO: 4.12 M/UL (ref 4.6–6.2)
SODIUM SERPL-SCNC: 134 MMOL/L (ref 136–145)
WBC # BLD AUTO: 5.69 K/UL (ref 3.9–12.7)

## 2025-01-28 PROCEDURE — 63600175 PHARM REV CODE 636 W HCPCS: Mod: HCNC | Performed by: HOSPITALIST

## 2025-01-28 PROCEDURE — 25000003 PHARM REV CODE 250: Mod: HCNC | Performed by: HOSPITALIST

## 2025-01-28 PROCEDURE — 25000003 PHARM REV CODE 250: Mod: HCNC | Performed by: NURSE PRACTITIONER

## 2025-01-28 PROCEDURE — 99233 SBSQ HOSP IP/OBS HIGH 50: CPT | Mod: HCNC,,, | Performed by: INTERNAL MEDICINE

## 2025-01-28 PROCEDURE — 80048 BASIC METABOLIC PNL TOTAL CA: CPT | Mod: HCNC | Performed by: HOSPITALIST

## 2025-01-28 PROCEDURE — 36415 COLL VENOUS BLD VENIPUNCTURE: CPT | Mod: HCNC | Performed by: HOSPITALIST

## 2025-01-28 PROCEDURE — 83735 ASSAY OF MAGNESIUM: CPT | Mod: HCNC | Performed by: HOSPITALIST

## 2025-01-28 PROCEDURE — 21400001 HC TELEMETRY ROOM: Mod: HCNC

## 2025-01-28 PROCEDURE — 84100 ASSAY OF PHOSPHORUS: CPT | Mod: HCNC | Performed by: HOSPITALIST

## 2025-01-28 PROCEDURE — 85025 COMPLETE CBC W/AUTO DIFF WBC: CPT | Mod: HCNC | Performed by: HOSPITALIST

## 2025-01-28 RX ADMIN — APIXABAN 5 MG: 2.5 TABLET, FILM COATED ORAL at 08:01

## 2025-01-28 RX ADMIN — TAMSULOSIN HYDROCHLORIDE 0.4 MG: 0.4 CAPSULE ORAL at 09:01

## 2025-01-28 RX ADMIN — METOPROLOL SUCCINATE 25 MG: 25 TABLET, EXTENDED RELEASE ORAL at 09:01

## 2025-01-28 RX ADMIN — PREDNISONE 20 MG: 20 TABLET ORAL at 09:01

## 2025-01-28 RX ADMIN — AMIODARONE HYDROCHLORIDE 200 MG: 200 TABLET ORAL at 08:01

## 2025-01-28 RX ADMIN — SODIUM BICARBONATE 1300 MG: 650 TABLET ORAL at 08:01

## 2025-01-28 RX ADMIN — BRIMONIDINE TARTRATE 1 DROP: 2 SOLUTION/ DROPS OPHTHALMIC at 09:01

## 2025-01-28 RX ADMIN — FERROUS SULFATE TAB 325 MG (65 MG ELEMENTAL FE) 1 EACH: 325 (65 FE) TAB at 09:01

## 2025-01-28 RX ADMIN — SODIUM BICARBONATE 1300 MG: 650 TABLET ORAL at 09:01

## 2025-01-28 RX ADMIN — AMIODARONE HYDROCHLORIDE 200 MG: 200 TABLET ORAL at 09:01

## 2025-01-28 RX ADMIN — PRAVASTATIN SODIUM 40 MG: 20 TABLET ORAL at 09:01

## 2025-01-28 RX ADMIN — APIXABAN 5 MG: 2.5 TABLET, FILM COATED ORAL at 09:01

## 2025-01-28 RX ADMIN — ALLOPURINOL 300 MG: 300 TABLET ORAL at 09:01

## 2025-01-28 NOTE — PROGRESS NOTES
[facility] ([address]  Cardiology  Progress Note    Patient Name: Jarrett Charlton Jr.  MRN: [id number]  Admission Date: 1/24/2025  Hospital Length of Stay: 4 days  Code Status: Full Code   Attending Physician: Poli Damon MD   Primary Care Physician: Tripp Mohan MD  Expected Discharge Date: 1/29/2025  Principal Problem:Acute deep vein thrombosis (DVT) of left lower extremity    Subjective:     Brief HPI:    Jarrett Charlton Jr. is a 74 y.o.male with hypertension, hypercholesterolemia and diabetes mellitus type 2. He has coronary artery disease having received a stent. His ascending aorta is mildly enlarged. He has issues with orthostatic hypotension for which he is on midodrine and used to be on fludrocortisone . He has undergone an ablation for atrial tachycardia. He has an implantable loop recorder. He has an ileostomy in the setting of Crohn's disease, has had prostate cancer and has advanced chronic kidney disease. He goes to the infusion center at [facility] to receive regular infusions. I saw him on 7/6/2024 after [place] presented to [place] after having had syncope on 7/5/2024. He tested positive for COVID. I saw him again in 8/2024 as an inpatient when he presented with shortness of breath. In the fall of 2024 he had a deep venous trombosis in his right leg. He began apixiban. Lately, he has been evaluated for recurrence of atrial fibrillation. He began amiodarone. The plan is an ablation. The night of 1/23/2025 he presented to the emergency room at [place] due to chest pain. His legs were edematous. He had a venous duplex that revealed a nonocclusive thrombus in the left greater saphenous vein. He was transferred to [facility] for his further care.    Hospital Course:     Heparin iv followed by apixiban.    Diuresis.    Interval History:     Remains weak.    Breathing easier.    No further CP.    Less  edema.    Review of Systems   Constitutional: Positive for malaise/fatigue. Negative for chills and fever.   HENT:  Negative for nosebleeds.    Eyes:  Negative for vision loss in left eye and vision loss in right eye.   Cardiovascular:  Positive for leg swelling and palpitations. Negative for chest pain, orthopnea and paroxysmal nocturnal dyspnea.   Respiratory:  Positive for shortness of breath. Negative for cough, hemoptysis, sputum production and wheezing.    Hematologic/Lymphatic: Negative for bleeding problem. Does not bruise/bleed easily.   Skin:  Negative for color change and rash.   Musculoskeletal:  Negative for muscle weakness and myalgias.   Gastrointestinal:  Negative for abdominal pain, heartburn, hematemesis, hematochezia, melena, nausea and vomiting.   Genitourinary:  Negative for hematuria.   Neurological:  Positive for weakness. Negative for dizziness, focal weakness, headaches, light-headedness and vertigo.   Psychiatric/Behavioral:  Negative for altered mental status. The patient is not nervous/anxious.    Allergic/Immunologic: Negative for persistent infections.     Objective:     Vital Signs (Most Recent):  Temp: 97.6 °F (36.4 °C) (01/28/25 0749)  Pulse: 85 (01/28/25 0749)  Resp: 18 (01/28/25 0749)  BP: 107/75 (01/28/25 0749)  SpO2: 97 % (01/28/25 0749) Vital Signs (24h Range):  Temp:  [97.5 °F (36.4 °C)-97.6 °F (36.4 °C)] 97.6 °F (36.4 °C)  Pulse:  [72-92] 85  Resp:  [15-19] 18  SpO2:  [94 %-100 %] 97 %  BP: (107-114)/(62-81) 107/75     Weight: 73 kg (160 lb 15 oz)  Body mass index is 20.66 kg/m².    SpO2: 97 %         Intake/Output Summary (Last 24 hours) at 1/28/2025 0836  Last data filed at 1/28/2025 0600  Gross per 24 hour   Intake 780 ml   Output 450 ml   Net 330 ml       Lines/Drains/Airways       Drain  Duration                  Ileostomy 01/01/84 RUQ 17736 days              Peripheral Intravenous Line  Duration                  Peripheral IV - Single Lumen 01/23/25 20 G Anterior;Left  Forearm 5 days                    Physical Exam  Constitutional:       General: He is not in acute distress.     Appearance: Normal appearance. He is well-developed. He is ill-appearing. He is not toxic-appearing or diaphoretic.   HENT:      Head: Normocephalic and atraumatic.      Nose: Nose normal.   Eyes:      General:         Right eye: No discharge.         Left eye: No discharge.      Conjunctiva/sclera:      Right eye: Right conjunctiva is not injected.      Left eye: Left conjunctiva is not injected.      Pupils: Pupils are equal.      Right eye: Pupil is round.      Left eye: Pupil is round.   Neck:      Thyroid: No thyromegaly.      Vascular: No carotid bruit or JVD.   Cardiovascular:      Rate and Rhythm: Normal rate and regular rhythm. No extrasystoles are present.     Chest Wall: PMI is not displaced.      Pulses:           Radial pulses are 2+ on the right side and 2+ on the left side.        Femoral pulses are 2+ on the right side and 2+ on the left side.       Dorsalis pedis pulses are 1+ on the right side and 1+ on the left side.        Posterior tibial pulses are 1+ on the right side and 1+ on the left side.      Heart sounds: S1 normal and S2 normal. Murmur heard.      High-pitched blowing holosystolic murmur is present with a grade of 2/6 at the apex.      No gallop.   Pulmonary:      Effort: Pulmonary effort is normal.      Breath sounds: Examination of the right-lower field reveals rales. Examination of the left-lower field reveals rales. Rales present.   Abdominal:      Palpations: Abdomen is soft.      Tenderness: There is no abdominal tenderness.   Musculoskeletal:      Cervical back: Neck supple.      Right lower leg: Swelling present. 1+ Edema present.      Left lower leg: Swelling present. 1+ Edema present.   Lymphadenopathy:      Head:      Right side of head: No submandibular adenopathy.      Left side of head: No submandibular adenopathy.      Cervical: No cervical adenopathy.    Skin:     General: Skin is warm and dry.      Findings: No rash.      Nails: There is no clubbing.   Neurological:      General: No focal deficit present.      Mental Status: He is alert and oriented to person, place, and time. He is not disoriented.      Cranial Nerves: No cranial nerve deficit.   Psychiatric:         Attention and Perception: Attention normal.         Mood and Affect: Mood and affect normal.         Speech: Speech normal.         Behavior: Behavior normal.         Thought Content: Thought content normal.         Cognition and Memory: Cognition and memory normal.         Judgment: Judgment normal.         Current Medications:     allopurinoL  300 mg Oral Daily    amiodarone  200 mg Oral BID    apixaban  5 mg Oral BID    brimonidine 0.2%  1 drop Both Eyes BID    [START ON 1/29/2025] colchicine  0.6 mg Oral Every Other Day    [START ON 1/29/2025] famotidine  20 mg Oral Q48H    ferrous sulfate  1 tablet Oral Every other day    metoprolol succinate  25 mg Oral Daily    pravastatin  40 mg Oral Daily    predniSONE  20 mg Oral Daily    Followed by    [START ON 1/29/2025] predniSONE  10 mg Oral Daily    sodium bicarbonate  1,300 mg Oral BID    tamsulosin  0.4 mg Oral Daily     Current Laboratory Results:    Recent Results (from the past 24 hours)   Basic Metabolic Panel    Collection Time: 01/27/25 10:54 AM   Result Value Ref Range    Sodium 134 (L) 136 - 145 mmol/L    Potassium 5.1 3.5 - 5.1 mmol/L    Chloride 98 95 - 110 mmol/L    CO2 24 23 - 29 mmol/L    Glucose 168 (H) 70 - 110 mg/dL    BUN 63 (H) 8 - 23 mg/dL    Creatinine 2.8 (H) 0.5 - 1.4 mg/dL    Calcium 9.0 8.7 - 10.5 mg/dL    Anion Gap 12 8 - 16 mmol/L    eGFR 23 (A) >60 mL/min/1.73 m^2   CBC Auto Differential    Collection Time: 01/27/25 10:54 AM   Result Value Ref Range    WBC 4.17 3.90 - 12.70 K/uL    RBC 3.88 (L) 4.60 - 6.20 M/uL    Hemoglobin 10.7 (L) 14.0 - 18.0 g/dL    Hematocrit 34.3 (L) 40.0 - 54.0 %    MCV 88 82 - 98 fL    MCH 27.6  27.0 - 31.0 pg    MCHC 31.2 (L) 32.0 - 36.0 g/dL    RDW 19.9 (H) 11.5 - 14.5 %    Platelets 101 (L) 150 - 450 K/uL    MPV SEE COMMENT 9.2 - 12.9 fL    Immature Granulocytes 0.5 0.0 - 0.5 %    Gran # (ANC) 3.7 1.8 - 7.7 K/uL    Immature Grans (Abs) 0.02 0.00 - 0.04 K/uL    Lymph # 0.3 (L) 1.0 - 4.8 K/uL    Mono # 0.2 (L) 0.3 - 1.0 K/uL    Eos # 0.0 0.0 - 0.5 K/uL    Baso # 0.00 0.00 - 0.20 K/uL    nRBC 0 0 /100 WBC    Gran % 87.7 (H) 38.0 - 73.0 %    Lymph % 8.2 (L) 18.0 - 48.0 %    Mono % 3.6 (L) 4.0 - 15.0 %    Eosinophil % 0.0 0.0 - 8.0 %    Basophil % 0.0 0.0 - 1.9 %    Differential Method Automated    Magnesium    Collection Time: 01/27/25 10:54 AM   Result Value Ref Range    Magnesium 1.7 1.6 - 2.6 mg/dL   Phosphorus    Collection Time: 01/27/25 10:54 AM   Result Value Ref Range    Phosphorus 4.2 2.7 - 4.5 mg/dL   POCT glucose    Collection Time: 01/27/25 12:30 PM   Result Value Ref Range    POCT Glucose 168 (H) 70 - 110 mg/dL   POCT glucose    Collection Time: 01/27/25  5:05 PM   Result Value Ref Range    POCT Glucose 195 (H) 70 - 110 mg/dL   POCT glucose    Collection Time: 01/27/25  8:32 PM   Result Value Ref Range    POCT Glucose 254 (H) 70 - 110 mg/dL   CBC Auto Differential    Collection Time: 01/28/25  4:05 AM   Result Value Ref Range    WBC 5.69 3.90 - 12.70 K/uL    RBC 4.12 (L) 4.60 - 6.20 M/uL    Hemoglobin 11.4 (L) 14.0 - 18.0 g/dL    Hematocrit 36.1 (L) 40.0 - 54.0 %    MCV 88 82 - 98 fL    MCH 27.7 27.0 - 31.0 pg    MCHC 31.6 (L) 32.0 - 36.0 g/dL    RDW 19.9 (H) 11.5 - 14.5 %    Platelets 113 (L) 150 - 450 K/uL    MPV SEE COMMENT 9.2 - 12.9 fL    Immature Granulocytes 0.4 0.0 - 0.5 %    Gran # (ANC) 4.7 1.8 - 7.7 K/uL    Immature Grans (Abs) 0.02 0.00 - 0.04 K/uL    Lymph # 0.6 (L) 1.0 - 4.8 K/uL    Mono # 0.4 0.3 - 1.0 K/uL    Eos # 0.0 0.0 - 0.5 K/uL    Baso # 0.01 0.00 - 0.20 K/uL    nRBC 0 0 /100 WBC    Gran % 83.2 (H) 38.0 - 73.0 %    Lymph % 10.0 (L) 18.0 - 48.0 %    Mono % 6.2 4.0 - 15.0 %     Eosinophil % 0.0 0.0 - 8.0 %    Basophil % 0.2 0.0 - 1.9 %    Differential Method Automated    Basic Metabolic Panel    Collection Time: 01/28/25  4:05 AM   Result Value Ref Range    Sodium 134 (L) 136 - 145 mmol/L    Potassium 5.2 (H) 3.5 - 5.1 mmol/L    Chloride 99 95 - 110 mmol/L    CO2 19 (L) 23 - 29 mmol/L    Glucose 159 (H) 70 - 110 mg/dL    BUN 63 (H) 8 - 23 mg/dL    Creatinine 3.2 (H) 0.5 - 1.4 mg/dL    Calcium 9.0 8.7 - 10.5 mg/dL    Anion Gap 16 8 - 16 mmol/L    eGFR 19 (A) >60 mL/min/1.73 m^2   Phosphorus    Collection Time: 01/28/25  4:05 AM   Result Value Ref Range    Phosphorus 5.0 (H) 2.7 - 4.5 mg/dL   Magnesium    Collection Time: 01/28/25  4:05 AM   Result Value Ref Range    Magnesium 1.8 1.6 - 2.6 mg/dL   POCT glucose    Collection Time: 01/28/25  7:46 AM   Result Value Ref Range    POCT Glucose 126 (H) 70 - 110 mg/dL     Current Imaging Results:    MRI Abdomen Pelvis Without Contrast (XPD)   Final Result      1. No evidence for intra-abdominal solid mass, noting low sensitivity of noncontrast examination.   2. Nodular liver contour with trace perihepatic fluid suspicious for cirrhosis.   3. Pancreatic cystic lesion, similar to appearance on prior CT and suboptimally characterized on noncontrast assessment.  Recommend further evaluation with dedicated contrast enhanced MRI with MRCP.   4. Bilateral renal cysts.   5. Additional findings above.         Electronically signed by: Adin Ramirez MD   Date:    01/25/2025   Time:    08:54      NM Lung Scan Ventilation Perfusion   Final Result      Low intermediate probability (20-50%) of acute pulmonary embolism.         Electronically signed by: Daniel Velazquez MD   Date:    01/24/2025   Time:    12:49          4/15/2021: Cath:    The pre-procedure left ventricular end diastolic pressure was 12.  The ejection fraction was greater than 55% by visual estimate.  Patent stent in the mid LAD with 20% stenosis just distal to this. IFR was point 9 3  Circumflex  with 20-30% mid stenosis IFR 0.99  Normal right coronary artery  Planned medical therapy continue amiodarone for ventricular tachycardia        8/5/2024: ECG: ST with APCs. RBBB. LAFB.        8/5/2024: Echo:  Left Ventricle: The left ventricle is normal in size. Moderately increased wall thickness. There is concentric hypertrophy. Moderate global hypokinesis present. There is moderately reduced systolic function. Ejection fraction by visual approximation is 35%. Grade II diastolic dysfunction. Elevated left ventricular filling pressure. Tissue Doppler velocity is reduced.  Right Ventricle: Normal right ventricular cavity size. Wall thickness is normal. Systolic function is normal.  Left Atrium: Left atrium is severely dilated.  Right Atrium: Right atrium is moderately dilated.  Aortic Valve: There is mild aortic valve sclerosis. Mildly restricted motion.  Mitral Valve: There is moderate regurgitation with a centrally directed jet.  Tricuspid Valve: There is mild to moderate regurgitation with a centrally directed jet.  Pulmonary Artery: There is severe pulmonary hypertension. The estimated pulmonary artery systolic pressure is 79 mmHg.  IVC/SVC: Intermediate venous pressure at 8 mmHg.  Pericardium: There is a small posterior effusion.       1/24/2025: Echo:  Left Ventricle: The left ventricle is mildly dilated. Ventricular mass is normal. Normal wall thickness. Severe global hypokinesis present. There is severely reduced systolic function. Ejection fraction is approximately 30%. Grade I diastolic dysfunction.  Right Ventricle: Normal right ventricular cavity size. Wall thickness is normal. Right ventricle wall motion has global hypokinesis. Systolic function is moderately reduced.  Left Atrium: Left atrium is severely dilated.  Right Atrium: Right atrium is moderately dilated.  Aortic Valve: The aortic valve is a trileaflet valve. There is mild aortic valve sclerosis. There is mild aortic regurgitation with a  centrally directed jet.  Mitral Valve: There is severe regurgitation with a centrally directed jet.  Tricuspid Valve: There is moderate to severe regurgitation with a centrally directed jet.  Pulmonary Artery: There is mild to moderate pulmonary hypertension. The estimated pulmonary artery systolic pressure is 42 mmHg.  IVC/SVC: Elevated venous pressure at 15 mmHg.  Pericardium: There is a small circumferential effusion. Left pleural effusion.      Assessment and Plan:     Problem List:    Active Diagnoses:    Diagnosis Date Noted POA    PRINCIPAL PROBLEM:  Acute deep vein thrombosis (DVT) of left lower extremity [I82.402] 01/24/2025 Yes    Pancreatic lesion [K86.9] 01/25/2025 Yes    Pseudogout [M11.20] 11/19/2024 Yes    Moderate malnutrition [E44.0] 10/03/2024 Yes    Acute on chronic combined systolic and diastolic heart failure [I50.43] 09/11/2024 Yes    Chronic kidney disease, stage IV (severe) [N18.4] 02/11/2021 Yes    Type 2 diabetes mellitus [E11.9] 09/30/2020 Yes    Essential hypertension [I10]  Yes      Problems Resolved During this Admission:    Diagnosis Date Noted Date Resolved POA    HLD (hyperlipidemia) [E78.5]  01/25/2025 Yes     Assessment and Plan:     1. Heart Failure, Systolic, Acute on Chronic              8/5/2024: Echo: Normal left ventricular size and with moderate systolic dysfunction. EF 35%. Moderate LVH. Moderate diastolic dysfunction. Severely dilated LA. Moderate MR. SPAP 79 mmHg.              1/24/2025: Echo: Mildly dilated left ventricle with severe systolic dysfunction. EF 30%. Mild diastolic dysfunction. Severely dilated LA. Moderate RV dysfunction. Mild aortic valve sclerosis. Severe MR. Moderate to severe TR.  SPAP 42 mmHg. RA 15 mmHg. Small PE.              1/24/2025: Fluid overloaded in HF. BNP 2,586. Received furosemide 40 mg iv Q24.              On metoprolol 25 mg Q24.              Difficult to manage with advanced CKD and issues with hypotension.   Still fluid overloaded in  need of further diuresis.     2. Mitral Regurgitation              1/24/2025: Echo: Severe MR. Moderate to severe TR. SPAP 42 mmHg.              Diuresis.     3. Coronary Artery Disease              4/15/2021: Cath: Nonobstructive disease with patent LAD stent.              Not on aspirin 81 mg Q24 as on apixiban.              Appears stable.     4. Chest Pain              1/24/2025: CP.              Appeared atypical.     5. Atrial Fibrillation              Paroxysmal AF.              On apixiban.              On metoprolol 25 mg Q24 and amiodarone 200 mg Q12.              No recent clinical recurrence.              Plan is ablation.     6. High Risk Medication              On amiodarone.     7. History of Atrial tachycardia              Underwent ablation.     8. Chronic Anticoagulation              On apixiban 5 mg Q12.     9. History of Deep Venous Trombosis of Lower Extremity              2024: Right leg DVT.              On apixiban 5 mg Q12.     10. Left Leg Thrombosis  1/24/2025: Asymptomatic thrombus in left greater saphenous vein.  On apixiban.            11. History of Syncope  7/5/2024: Syncope  Occurred after episode of diaphoresis in setting of testing  positive for COVID and having orthostatic hypotension.     12. Orthostatic Hypotension              On midodrine.  Used to be on fludrocortisone 100 mcg Q24.     13. Hypertension              At home was on metoprolol 25 mg Q24 and furosemide 40 mg Q24.     14. Hypercholesterolemia              On pravastatin 40 mg Q24.     15. Diabetes Mellitus, Type 2     16. Chronic Kidney Disease, Stage 4              8/8/2024: BUN/crea 45/2.6. eGFR 25.              1/24/2025: BUN/crea 54/2.6. eGFR 25.   1/27/2025: BUN/crea 63/3.2. eGFR 19. K 5.2.   Very concerning.                 17. Prostate Cancer     18. Ileostomy     19. Crohn's Disease                VTE Risk Mitigation (From admission, onward)           Ordered     apixaban tablet 5 mg  2 times daily          01/26/25 0853     Reason for No Pharmacological VTE Prophylaxis  Once        Question:  Reasons:  Answer:  Already adequately anticoagulated on oral Anticoagulants    01/24/25 0305     IP VTE HIGH RISK PATIENT  Once         01/24/25 0305     Place sequential compression device  Until discontinued         01/24/25 0305                    Almaz Hernández MD  Cardiology  [facility] ([address])

## 2025-01-28 NOTE — TELEPHONE ENCOUNTER
----- Message from Rod Todd MD sent at 1/22/2025  3:03 PM CST -----  Regarding: GI f/u  Can you get this karoline in to see us? Any provider

## 2025-01-28 NOTE — SUBJECTIVE & OBJECTIVE
Interval History:  No acute events overnight.  Very frustrated by his need for hospitalization.  Complains that he hasn't bathed since before admission, but refuses assistance to help him shower.  States he is breathing more comfortably and his right hand pain has nearly resolved.  All questions answered and patient had no further complaints.    Objective:     Vital Signs (Most Recent):  Temp: 97.6 °F (36.4 °C) (01/28/25 0749)  Pulse: 86 (01/28/25 1200)  Resp: 20 (01/28/25 1200)  BP: 124/66 (01/28/25 1200)  SpO2: 98 % (01/28/25 1200) Vital Signs (24h Range):  Temp:  [97.5 °F (36.4 °C)-97.6 °F (36.4 °C)] 97.6 °F (36.4 °C)  Pulse:  [72-92] 86  Resp:  [15-20] 20  SpO2:  [94 %-100 %] 98 %  BP: (107-124)/(62-81) 124/66     Weight: 73 kg (160 lb 15 oz)  Body mass index is 20.66 kg/m².    Intake/Output Summary (Last 24 hours) at 1/28/2025 1446  Last data filed at 1/28/2025 0852  Gross per 24 hour   Intake 240 ml   Output 460 ml   Net -220 ml         Physical Exam  Constitutional:       General: He is in acute distress.      Appearance: He is ill-appearing.   HENT:      Mouth/Throat:      Mouth: Mucous membranes are dry.   Eyes:      Extraocular Movements: Extraocular movements intact.   Cardiovascular:      Rate and Rhythm: Normal rate and regular rhythm.      Heart sounds: Murmur heard.   Pulmonary:      Effort: Pulmonary effort is normal. No respiratory distress.      Breath sounds: No wheezing.      Comments: Faint bibasilar crackles  Abdominal:      General: Bowel sounds are normal. There is no distension.      Palpations: Abdomen is soft.      Tenderness: There is no abdominal tenderness.      Comments: Ostomy appears well perfused and bag is full of gas with some brown liquid stool.  Abdomen is soft without distention and bowel sounds are normal.   Musculoskeletal:         General: Normal range of motion.      Cervical back: Normal range of motion.      Comments: Still with some pitting edema to shins - but clearly  improved with wrinkling   Skin:     General: Skin is warm and dry.      Coloration: Skin is not pale.      Findings: No erythema or rash.   Neurological:      Mental Status: He is alert and oriented to person, place, and time.   Psychiatric:      Comments: Dysphoric mood             Significant Labs: All pertinent labs within the past 24 hours have been reviewed.    Significant Imaging: I have reviewed all pertinent imaging results/findings within the past 24 hours.

## 2025-01-28 NOTE — PROGRESS NOTES
"[address] Medicine  Progress Note    Patient Name: Jarrett Charlton Jr.  MRN: [id number]  Patient Class: IP- Inpatient   Admission Date: 1/24/2025  Length of Stay: 4 days  Attending Physician: Poli Damon MD  Primary Care Provider: Tripp Mohan MD        Subjective     Principal Problem:Acute deep vein thrombosis (DVT) of left lower extremity        HPI:  Per Abhi Canela, NP:    "The patient is a 74 year old male with a past medical history of CAD status post PCI to mid LAD in 2017, Hx of VT, Atrial tachycardia s/p AT ablation in 4/2024 and DVT on Eliquis, CHF with the EF of 35%, SBO, s/p colostomy, hypertension, hyperlipidemia, and DM2 who presented to the ED at [facility] with reports of cough, congestion and bilateral chest pain.  His chest pain increased with coughing and moving his body as well as taking a deep breath over the last few days. Patient states that the chest started on left side of his chest and went to the right side. He states it feels like an aching. He states that his easy to breathe through his mouth, but he feels like he cannot breathe through his nose. He feels that the swelling in his legs has worsened.  While in the ED, it was discovered that he has a DVT to the left lower extremity and decision was made to transfer to [facility] [place], operated by [facility] for possible IVC filter placement.    On arrival to [facility] [place], operated by [facility], the patient had severe chest pain radiating into the center of the chest.  Reports worsened shortness of breath.  He will be admitted to [facility] [place], operated by [facility] for further management of his acute chest pain and Cardiology consult."    Overview/Hospital Course:  Patient is a 74-year-old man with diabetes mellitus type 2 (last hemoglobin A1c 6.5 percent last month), coronary artery disease status post stent placement to left anterior descending artery in 2017, history of atrial fibrillation and atrial tachycardia and status post ablation in 4/20/2024, chronic " kidney disease stage 4, history of deep vein thrombosis on apixaban, chronic systolic heart failure with ejection fraction 35% who presented [facility] with shortness of breath and chest pain (10/10).  Ultrasound of the lower extremity revealed nonocclusive deep vein thrombosis on the left greater saphenous vein.     Patient reports history of ulcerative colitis for which he underwent bowel resection with ileostomy in 1985 and had small bowel obstruction recently which resolved without surgical intervention.  Patient reports poor oral intake since his bowel obstruction but reports stool output from ileostomy.     Patient with prior history a deep vein thrombosis in the left external iliac vein, common femoral vein, and greater saphenous vein visualized on ultrasound on 8/14/2024.     Patient transferred to [facility] for higher level of care.    Patient treated with intravenous heparin infusion for anticoagulation.  Patient also treated with intravenous diuretic to treat decompensated heart failure.     Per out for acute coronary syndrome based on serial troponins.  Chest pain negative.  V/Q scan negative low probability for pulmonary embolism.    Interval History:  No acute events overnight.  Very frustrated by his need for hospitalization.  Complains that he hasn't bathed since before admission, but refuses assistance to help him shower.  States he is breathing more comfortably and his right hand pain has nearly resolved.  All questions answered and patient had no further complaints.    Objective:     Vital Signs (Most Recent):  Temp: 97.6 °F (36.4 °C) (01/28/25 0749)  Pulse: 86 (01/28/25 1200)  Resp: 20 (01/28/25 1200)  BP: 124/66 (01/28/25 1200)  SpO2: 98 % (01/28/25 1200) Vital Signs (24h Range):  Temp:  [97.5 °F (36.4 °C)-97.6 °F (36.4 °C)] 97.6 °F (36.4 °C)  Pulse:  [72-92] 86  Resp:  [15-20] 20  SpO2:  [94 %-100 %] 98 %  BP: (107-124)/(62-81) 124/66     Weight: 73 kg (160 lb 15  oz)  Body mass index is 20.66 kg/m².    Intake/Output Summary (Last 24 hours) at 1/28/2025 1446  Last data filed at 1/28/2025 0852  Gross per 24 hour   Intake 240 ml   Output 460 ml   Net -220 ml         Physical Exam  Constitutional:       General: He is in acute distress.      Appearance: He is ill-appearing.   HENT:      Mouth/Throat:      Mouth: Mucous membranes are dry.   Eyes:      Extraocular Movements: Extraocular movements intact.   Cardiovascular:      Rate and Rhythm: Normal rate and regular rhythm.      Heart sounds: Murmur heard.   Pulmonary:      Effort: Pulmonary effort is normal. No respiratory distress.      Breath sounds: No wheezing.      Comments: Faint bibasilar crackles  Abdominal:      General: Bowel sounds are normal. There is no distension.      Palpations: Abdomen is soft.      Tenderness: There is no abdominal tenderness.      Comments: Ostomy appears well perfused and bag is full of gas with some brown liquid stool.  Abdomen is soft without distention and bowel sounds are normal.   Musculoskeletal:         General: Normal range of motion.      Cervical back: Normal range of motion.      Comments: Still with some pitting edema to shins - but clearly improved with wrinkling   Skin:     General: Skin is warm and dry.      Coloration: Skin is not pale.      Findings: No erythema or rash.   Neurological:      Mental Status: He is alert and oriented to person, place, and time.   Psychiatric:      Comments: Dysphoric mood             Significant Labs: All pertinent labs within the past 24 hours have been reviewed.    Significant Imaging: I have reviewed all pertinent imaging results/findings within the past 24 hours.    Assessment and Plan     * Acute deep vein thrombosis (DVT) of left lower extremity  -Ultrasound showrf nonocclusive DVT within the left greater saphenous vein.  Great saphenous vein is a superficial vein therefore this is not really a DVT.    -V/Q scan low probability for  "pulmonary embolism.    -Overall did not feel that patient really failed treatment with the apixaban.  Light of increased risk of bleeding complication rivaroxaban compared to apixaban will continue with long-term anticoagulation with apixaban.  Discontinue heparin infusion.    -Started apixaban 1/26/2025.  Continue apixaban.    Acute on chronic combined systolic and diastolic heart failure  -Fluid overloaded on admission  -Echo showed EF 30%, grade I diastolic dysfunction, severe LA dilation, moderate RA dilation, mod/severe TR, mild to moderate pulmonary hypertension (PASP 42mmHg)  -Cardiology consulted and input appreciated  -Continue toprol  -Diuresed with IV lasix and clinically has improved.  Still with some crackles and peripheral edema.  However, renal function is worse so will hold off on iv lasix today.  Ultimately, may need to accept worse renal function in order to maintain euvolemia.  -Discussed with Dr. Hernández today.    Acute renal failure superimposed on stage 4 chronic kidney disease  -Cr on admit 2.6.  Today Cr up to 3.2 with slight hyperkalemia (5.2) and normal anion-gap metabolic acidosis  -Baseline Cr 2.2-2.7.  Follows with [facility].  Per last visit note with Dr. Aguirre 11/7/2024: "CKD clinically 2/2 age-related nephron loss,multiple Jelani episodes, chronic hypotension leading to hypoperfusion an recent worsening heart failure with EF 35 % and grade 2 DD, previous NSAID use for gout, volume depletion episodes c high ostomy output."   -Hold iv lasix and potassium for today.  -Avoid hypotension - hold parameter placed for toprol  -Continue Nabicarb tablets  -Repeat BMP in AM    Thrombocytopenia  -On admit 136k and trended down to 83k.  Now appears to be improving and is 113  -Continue eliquis  -Repeat cbc in AM    Pseudogout  -Evidence of flare involving primarily her insulin right hand.    -Treating with short course of prednisone and every other day colchicine.  " Improving.      Pancreatic lesion  -Due to advanced kidney disease patient at risk for nephrogenic systemic fibrosis/nephrogenic fibrosing dermopathy.    -MRI without contrast obtained showed no evidence for intra-abdominal solid mass.  Pancreatic cystic lesion noted.    -Recommend outpatient follow-up for ERCP/EUS.    Moderate malnutrition  -Nutrition consulted. Most recent weight and BMI monitored-   -Measurements:  Wt Readings from Last 1 Encounters:   01/24/25 73 kg (160 lb 15 oz)   Body mass index is 20.66 kg/m².  -Patient has been screened and assessed by RD.  -Malnutrition Type:Context: chronic illness Level: moderate  -Malnutrition Characteristic Summary:  Weight Loss (Malnutrition): greater than 20% in 1 year  Energy Intake (Malnutrition): less than 75% for greater than or equal to 3 months  -Interventions/Recommendations (treatment strategy):  1) Add Boost Glucose Control to every meal  2) Continue current diet and encourage intake of meals  3) Honor pts food preferences  4) RD to monitor and follow up    Type 2 diabetes mellitus  -A1c 6.5 12/27/24  -Home regimen includes glimepiride - holding during hospitalization  -Blood sugars reasonably controlled today - continue SSI    High output ileostomy  -History noted and it appears intact.  However patient believes he may have obstruction due to diminished output  -Check KUB today.    Essential hypertension  -Well controlled without hypotension (a problem for him in the past)  -Continue toprol with hold parameters to avoid hypotension      VTE Risk Mitigation (From admission, onward)           Ordered     apixaban tablet 5 mg  2 times daily         01/26/25 0853     Reason for No Pharmacological VTE Prophylaxis  Once        Question:  Reasons:  Answer:  Already adequately anticoagulated on oral Anticoagulants    01/24/25 0305     IP VTE HIGH RISK PATIENT  Once         01/24/25 0305     Place sequential compression device  Until discontinued         01/24/25  0305                    Discharge Planning   JOSÉ MIGUEL: 1/29/2025     Code Status: Full Code   Medical Readiness for Discharge Date:   Discharge Plan A: Home with family                        Poli Damon MD  Department of Hospital Medicine   [facility] ([address]

## 2025-01-28 NOTE — PLAN OF CARE
01/28/25 1040   Rounds   Attendance Provider;Nurse    Discharge Plan A Home with family   Why the patient remains in the hospital Requires continued medical care   Transition of Care Barriers None

## 2025-01-28 NOTE — PROGRESS NOTES
[facility] ([address]  Cardiology  Progress Note    Patient Name: Jarrett Charlton Jr.  MRN: [id number]  Admission Date: 1/24/2025  Hospital Length of Stay: 3 days  Code Status: Full Code   Attending Physician: Darrell Cheema MD   Primary Care Physician: Tripp Mohan MD  Expected Discharge Date: 1/29/2025  Principal Problem:Acute deep vein thrombosis (DVT) of left lower extremity    Subjective:     Brief HPI:    Jarrett Charlton Jr. is a 74 y.o.male with hypertension, hypercholesterolemia and diabetes mellitus type 2. He has coronary artery disease having received a stent. His ascending aorta is mildly enlarged. He has issues with orthostatic hypotension for which he is on midodrine and used to be on fludrocortisone . He has undergone an ablation for atrial tachycardia. He has an implantable loop recorder. He has an ileostomy in the setting of Crohn's disease, has had prostate cancer and has advanced chronic kidney disease. He goes to the infusion center at [facility] to receive regular infusions. I saw him on 7/6/2024 after he presented to [facility] after having had syncope on 7/5/2024. He tested positive for COVID. I saw him again in 8/2024 as an inpatient when he presented with shortness of breath. In the fall of 2024 he had a deep venous trombosis in his right leg. He began apixiban. Lately, he has been evaluated for recurrence of atrial fibrillation. He began amiodarone. The plan is an ablation. The night of 1/23/2025 he presented to the emergency room at [facility] due to chest pain. His legs were edematous. He had a venous duplex that revealed a nonocclusive thrombus in the left greater saphenous vein. He was transferred to [facility] for his further care.    Hospital Course:     Heparin iv followed by apixiban.    Diuresis.    Interval History:     Remains weak.    Breathing easier.    No further CP.    Less  edema.    Review of Systems   Constitutional: Positive for malaise/fatigue. Negative for chills and fever.   HENT:  Negative for nosebleeds.    Eyes:  Negative for vision loss in left eye and vision loss in right eye.   Cardiovascular:  Positive for leg swelling, orthopnea and palpitations. Negative for chest pain and paroxysmal nocturnal dyspnea.   Respiratory:  Positive for shortness of breath. Negative for cough, hemoptysis, sputum production and wheezing.    Hematologic/Lymphatic: Negative for bleeding problem. Does not bruise/bleed easily.   Skin:  Negative for color change and rash.   Musculoskeletal:  Negative for muscle weakness and myalgias.   Gastrointestinal:  Negative for abdominal pain, heartburn, hematemesis, hematochezia, melena, nausea and vomiting.   Genitourinary:  Negative for hematuria.   Neurological:  Positive for weakness. Negative for dizziness, focal weakness, headaches, light-headedness and vertigo.   Psychiatric/Behavioral:  Negative for altered mental status. The patient is not nervous/anxious.    Allergic/Immunologic: Negative for persistent infections.     Objective:     Vital Signs (Most Recent):  Temp: 97.8 °F (36.6 °C) (01/27/25 0751)  Pulse: 72 (01/27/25 1453)  Resp: 18 (01/27/25 0751)  BP: 95/61 (01/27/25 0751)  SpO2: 98 % (01/27/25 0751) Vital Signs (24h Range):  Temp:  [97.8 °F (36.6 °C)] 97.8 °F (36.6 °C)  Pulse:  [72-95] 72  Resp:  [15-24] 18  SpO2:  [96 %-99 %] 98 %  BP: ()/(57-64) 95/61     Weight: 73 kg (160 lb 15 oz)  Body mass index is 20.66 kg/m².    SpO2: 98 %         Intake/Output Summary (Last 24 hours) at 1/27/2025 1944  Last data filed at 1/27/2025 1851  Gross per 24 hour   Intake 540 ml   Output 500 ml   Net 40 ml       Lines/Drains/Airways       Drain  Duration                  Ileostomy 01/01/84 RUQ 26150 days              Peripheral Intravenous Line  Duration                  Peripheral IV - Single Lumen 01/23/25 20 G Anterior;Left Forearm 4 days                     Physical Exam  Constitutional:       General: He is not in acute distress.     Appearance: Normal appearance. He is well-developed. He is ill-appearing. He is not toxic-appearing or diaphoretic.   HENT:      Head: Normocephalic and atraumatic.      Nose: Nose normal.   Eyes:      General:         Right eye: No discharge.         Left eye: No discharge.      Conjunctiva/sclera:      Right eye: Right conjunctiva is not injected.      Left eye: Left conjunctiva is not injected.      Pupils: Pupils are equal.      Right eye: Pupil is round.      Left eye: Pupil is round.   Neck:      Thyroid: No thyromegaly.      Vascular: No carotid bruit or JVD.   Cardiovascular:      Rate and Rhythm: Normal rate and regular rhythm. No extrasystoles are present.     Chest Wall: PMI is not displaced.      Pulses:           Radial pulses are 2+ on the right side and 2+ on the left side.        Femoral pulses are 2+ on the right side and 2+ on the left side.       Dorsalis pedis pulses are 1+ on the right side and 1+ on the left side.        Posterior tibial pulses are 1+ on the right side and 1+ on the left side.      Heart sounds: S1 normal and S2 normal. Murmur heard.      High-pitched blowing holosystolic murmur is present with a grade of 2/6 at the apex.      No gallop.   Pulmonary:      Effort: Pulmonary effort is normal.      Breath sounds: Examination of the right-lower field reveals rales. Examination of the left-lower field reveals rales. Rales present.   Abdominal:      Palpations: Abdomen is soft.      Tenderness: There is no abdominal tenderness.   Musculoskeletal:      Cervical back: Neck supple.      Right lower leg: Swelling present. 2+ Edema present.      Left lower leg: Swelling present. 2+ Edema present.   Lymphadenopathy:      Head:      Right side of head: No submandibular adenopathy.      Left side of head: No submandibular adenopathy.      Cervical: No cervical adenopathy.   Skin:     General: Skin is  warm and dry.      Findings: No rash.      Nails: There is no clubbing.   Neurological:      General: No focal deficit present.      Mental Status: He is alert and oriented to person, place, and time. He is not disoriented.      Cranial Nerves: No cranial nerve deficit.   Psychiatric:         Attention and Perception: Attention normal.         Mood and Affect: Mood and affect normal.         Speech: Speech normal.         Behavior: Behavior normal.         Thought Content: Thought content normal.         Cognition and Memory: Cognition and memory normal.         Judgment: Judgment normal.         Current Medications:     allopurinoL  300 mg Oral Daily    amiodarone  200 mg Oral BID    apixaban  5 mg Oral BID    brimonidine 0.2%  1 drop Both Eyes BID    [START ON 1/29/2025] colchicine  0.6 mg Oral Every Other Day    [START ON 1/29/2025] famotidine  20 mg Oral Q48H    ferrous sulfate  1 tablet Oral Every other day    furosemide (LASIX) injection  40 mg Intravenous Daily    metoprolol succinate  25 mg Oral Daily    potassium chloride  20 mEq Oral Daily    pravastatin  40 mg Oral Daily    predniSONE  20 mg Oral Daily    Followed by    [START ON 1/29/2025] predniSONE  10 mg Oral Daily    sodium bicarbonate  1,300 mg Oral BID    tamsulosin  0.4 mg Oral Daily     Current Laboratory Results:    Recent Results (from the past 24 hours)   APTT    Collection Time: 01/26/25 11:49 PM   Result Value Ref Range    aPTT 34.2 (H) 21.0 - 32.0 sec   POCT glucose    Collection Time: 01/27/25  5:50 AM   Result Value Ref Range    POCT Glucose 187 (H) 70 - 110 mg/dL   POCT glucose    Collection Time: 01/27/25  7:50 AM   Result Value Ref Range    POCT Glucose 176 (H) 70 - 110 mg/dL   Basic Metabolic Panel    Collection Time: 01/27/25 10:54 AM   Result Value Ref Range    Sodium 134 (L) 136 - 145 mmol/L    Potassium 5.1 3.5 - 5.1 mmol/L    Chloride 98 95 - 110 mmol/L    CO2 24 23 - 29 mmol/L    Glucose 168 (H) 70 - 110 mg/dL    BUN 63 (H) 8 -  23 mg/dL    Creatinine 2.8 (H) 0.5 - 1.4 mg/dL    Calcium 9.0 8.7 - 10.5 mg/dL    Anion Gap 12 8 - 16 mmol/L    eGFR 23 (A) >60 mL/min/1.73 m^2   CBC Auto Differential    Collection Time: 01/27/25 10:54 AM   Result Value Ref Range    WBC 4.17 3.90 - 12.70 K/uL    RBC 3.88 (L) 4.60 - 6.20 M/uL    Hemoglobin 10.7 (L) 14.0 - 18.0 g/dL    Hematocrit 34.3 (L) 40.0 - 54.0 %    MCV 88 82 - 98 fL    MCH 27.6 27.0 - 31.0 pg    MCHC 31.2 (L) 32.0 - 36.0 g/dL    RDW 19.9 (H) 11.5 - 14.5 %    Platelets 101 (L) 150 - 450 K/uL    MPV SEE COMMENT 9.2 - 12.9 fL    Immature Granulocytes 0.5 0.0 - 0.5 %    Gran # (ANC) 3.7 1.8 - 7.7 K/uL    Immature Grans (Abs) 0.02 0.00 - 0.04 K/uL    Lymph # 0.3 (L) 1.0 - 4.8 K/uL    Mono # 0.2 (L) 0.3 - 1.0 K/uL    Eos # 0.0 0.0 - 0.5 K/uL    Baso # 0.00 0.00 - 0.20 K/uL    nRBC 0 0 /100 WBC    Gran % 87.7 (H) 38.0 - 73.0 %    Lymph % 8.2 (L) 18.0 - 48.0 %    Mono % 3.6 (L) 4.0 - 15.0 %    Eosinophil % 0.0 0.0 - 8.0 %    Basophil % 0.0 0.0 - 1.9 %    Differential Method Automated    Magnesium    Collection Time: 01/27/25 10:54 AM   Result Value Ref Range    Magnesium 1.7 1.6 - 2.6 mg/dL   Phosphorus    Collection Time: 01/27/25 10:54 AM   Result Value Ref Range    Phosphorus 4.2 2.7 - 4.5 mg/dL   POCT glucose    Collection Time: 01/27/25 12:30 PM   Result Value Ref Range    POCT Glucose 168 (H) 70 - 110 mg/dL   POCT glucose    Collection Time: 01/27/25  5:05 PM   Result Value Ref Range    POCT Glucose 195 (H) 70 - 110 mg/dL     Current Imaging Results:    MRI Abdomen Pelvis Without Contrast (XPD)   Final Result      1. No evidence for intra-abdominal solid mass, noting low sensitivity of noncontrast examination.   2. Nodular liver contour with trace perihepatic fluid suspicious for cirrhosis.   3. Pancreatic cystic lesion, similar to appearance on prior CT and suboptimally characterized on noncontrast assessment.  Recommend further evaluation with dedicated contrast enhanced MRI with MRCP.   4.  Bilateral renal cysts.   5. Additional findings above.         Electronically signed by: Adin Ramirez MD   Date:    01/25/2025   Time:    08:54      NM Lung Scan Ventilation Perfusion   Final Result      Low intermediate probability (20-50%) of acute pulmonary embolism.         Electronically signed by: Daniel Velazquez MD   Date:    01/24/2025   Time:    12:49          4/15/2021: Cath:    The pre-procedure left ventricular end diastolic pressure was 12.  The ejection fraction was greater than 55% by visual estimate.  Patent stent in the mid LAD with 20% stenosis just distal to this. IFR was point 9 3  Circumflex with 20-30% mid stenosis IFR 0.99  Normal right coronary artery  Planned medical therapy continue amiodarone for ventricular tachycardia        8/5/2024: ECG: ST with APCs. RBBB. LAFB.        8/5/2024: Echo:  Left Ventricle: The left ventricle is normal in size. Moderately increased wall thickness. There is concentric hypertrophy. Moderate global hypokinesis present. There is moderately reduced systolic function. Ejection fraction by visual approximation is 35%. Grade II diastolic dysfunction. Elevated left ventricular filling pressure. Tissue Doppler velocity is reduced.  Right Ventricle: Normal right ventricular cavity size. Wall thickness is normal. Systolic function is normal.  Left Atrium: Left atrium is severely dilated.  Right Atrium: Right atrium is moderately dilated.  Aortic Valve: There is mild aortic valve sclerosis. Mildly restricted motion.  Mitral Valve: There is moderate regurgitation with a centrally directed jet.  Tricuspid Valve: There is mild to moderate regurgitation with a centrally directed jet.  Pulmonary Artery: There is severe pulmonary hypertension. The estimated pulmonary artery systolic pressure is 79 mmHg.  IVC/SVC: Intermediate venous pressure at 8 mmHg.  Pericardium: There is a small posterior effusion.       1/24/2025: Echo:  Left Ventricle: The left ventricle is mildly  dilated. Ventricular mass is normal. Normal wall thickness. Severe global hypokinesis present. There is severely reduced systolic function. Ejection fraction is approximately 30%. Grade I diastolic dysfunction.  Right Ventricle: Normal right ventricular cavity size. Wall thickness is normal. Right ventricle wall motion has global hypokinesis. Systolic function is moderately reduced.  Left Atrium: Left atrium is severely dilated.  Right Atrium: Right atrium is moderately dilated.  Aortic Valve: The aortic valve is a trileaflet valve. There is mild aortic valve sclerosis. There is mild aortic regurgitation with a centrally directed jet.  Mitral Valve: There is severe regurgitation with a centrally directed jet.  Tricuspid Valve: There is moderate to severe regurgitation with a centrally directed jet.  Pulmonary Artery: There is mild to moderate pulmonary hypertension. The estimated pulmonary artery systolic pressure is 42 mmHg.  IVC/SVC: Elevated venous pressure at 15 mmHg.  Pericardium: There is a small circumferential effusion. Left pleural effusion.    Assessment and Plan:     Problem List:    Active Diagnoses:    Diagnosis Date Noted POA    PRINCIPAL PROBLEM:  Acute deep vein thrombosis (DVT) of left lower extremity [I82.402] 01/24/2025 Yes    Pancreatic lesion [K86.9] 01/25/2025 Yes    Pseudogout [M11.20] 11/19/2024 Yes    Moderate malnutrition [E44.0] 10/03/2024 Yes    Acute on chronic combined systolic and diastolic heart failure [I50.43] 09/11/2024 Yes    Chronic kidney disease, stage IV (severe) [N18.4] 02/11/2021 Yes    Type 2 diabetes mellitus [E11.9] 09/30/2020 Yes    Essential hypertension [I10]  Yes      Problems Resolved During this Admission:    Diagnosis Date Noted Date Resolved POA    HLD (hyperlipidemia) [E78.5]  01/25/2025 Yes     Assessment and Plan:     1. Heart Failure, Systolic, Acute on Chronic              8/5/2024: Echo: Normal left ventricular size and with moderate systolic dysfunction. EF  35%. Moderate LVH. Moderate diastolic dysfunction. Severely dilated LA. Moderate MR. SPAP 79 mmHg.              1/24/2025: Echo: Mildly dilated left ventricle with severe systolic dysfunction. EF 30%. Mild diastolic dysfunction. Severely dilated LA. Moderate RV dysfunction. Mild aortic valve sclerosis. Severe MR. Moderate to severe TR.  SPAP 42 mmHg. RA 15 mmHg. Small PE.              1/24/2025: Fluid overloaded in HF. BNP 2,586.              On metoprolol 25 mg Q24.              On furosemide 40 mg iv Q24.              Needs further diuresis.              Will be difficult to manage with advanced CKD and issues with hypotension.     2. Mitral Regurgitation              1/24/2025: Echo: Severe MR. Moderate to severe TR. SPAP 42 mmHg.              Diuresis.     3. Coronary Artery Disease              4/15/2021: Cath: Nonobstructive disease with patent LAD stent.              Not on aspirin 81 mg Q24 as on apixiban.              Appears stable.     4. Chest Pain              1/24/2025: CP.              Appeared atypical.     5. Atrial Fibrillation              Paroxysmal AF.              On apixiban.              On metoprolol 25 mg Q24 and amiodarone 200 mg Q12.              No recent clinical recurrence.              Plan is ablation.     6. High Risk Medication              On amiodarone.     7. History of Atrial tachycardia              Underwent ablation.     8. Chronic Anticoagulation              On apixiban 5 mg Q12.     9. History of Deep Venous Trombosis of Lower Extremity              2024: Right leg DVT.              On apixiban 5 mg Q12.     10. Left Leg Thrombosis  1/24/2025: Asymptomatic thrombus in left greater saphenous vein.  On apixiban.            11. History of Syncope  7/5/2024: Syncope  Occurred after episode of diaphoresis in setting of testing  positive for COVID and having orthostatic hypotension.     12. Orthostatic Hypotension              On midodrine.  Used to be on fludrocortisone 100  mcg Q24.     13. Hypertension              At home was on metoprolol 25 mg Q24 and furosemide 40 mg Q24.     14. Hypercholesterolemia              On pravastatin 40 mg Q24.     15. Diabetes Mellitus, Type 2     16. Chronic Kidney Disease, Stage 4              8/8/2024: BUN/crea 45/2.6. eGFR 25.              1/24/2025: BUN/crea 54/2.6. eGFR 25.                 17. Prostate Cancer     18. Ileostomy     19. Crohn's Disease                VTE Risk Mitigation (From admission, onward)           Ordered     apixaban tablet 5 mg  2 times daily         01/26/25 0853     Reason for No Pharmacological VTE Prophylaxis  Once        Question:  Reasons:  Answer:  Already adequately anticoagulated on oral Anticoagulants    01/24/25 0305     IP VTE HIGH RISK PATIENT  Once         01/24/25 0305     Place sequential compression device  Until discontinued         01/24/25 0305                    Almaz Hernández MD  Cardiology  [facility] ([address])

## 2025-01-28 NOTE — PLAN OF CARE
Problem: Adult Inpatient Plan of Care  Goal: Plan of Care Review  Outcome: Progressing  Goal: Patient-Specific Goal (Individualized)  Outcome: Progressing  Goal: Absence of Hospital-Acquired Illness or Injury  Outcome: Progressing  Goal: Optimal Comfort and Wellbeing  Outcome: Progressing  Goal: Readiness for Transition of Care  Outcome: Progressing     Problem: Diabetes Comorbidity  Goal: Blood Glucose Level Within Targeted Range  Outcome: Progressing     Problem: Wound  Goal: Optimal Coping  Outcome: Progressing  Goal: Optimal Functional Ability  Outcome: Progressing  Goal: Absence of Infection Signs and Symptoms  Outcome: Progressing  Goal: Improved Oral Intake  Outcome: Progressing  Goal: Optimal Pain Control and Function  Outcome: Progressing  Goal: Skin Health and Integrity  Outcome: Progressing  Goal: Optimal Wound Healing  Outcome: Progressing     Problem: Fall Injury Risk  Goal: Absence of Fall and Fall-Related Injury  Outcome: Progressing     Problem: VTE (Venous Thromboembolism)  Goal: Tissue Perfusion  Outcome: Progressing     Problem: Adult Inpatient Plan of Care  Goal: Plan of Care Review  Outcome: Progressing     Problem: Adult Inpatient Plan of Care  Goal: Patient-Specific Goal (Individualized)  Outcome: Progressing     Problem: Adult Inpatient Plan of Care  Goal: Absence of Hospital-Acquired Illness or Injury  Outcome: Progressing     Problem: Adult Inpatient Plan of Care  Goal: Optimal Comfort and Wellbeing  Outcome: Progressing     Problem: Adult Inpatient Plan of Care  Goal: Readiness for Transition of Care  Outcome: Progressing     Problem: Diabetes Comorbidity  Goal: Blood Glucose Level Within Targeted Range  Outcome: Progressing     Problem: Wound  Goal: Optimal Coping  Outcome: Progressing

## 2025-01-29 DIAGNOSIS — N18.4 STAGE 4 CHRONIC KIDNEY DISEASE: Primary | ICD-10-CM

## 2025-01-29 LAB
ANION GAP SERPL CALC-SCNC: 16 MMOL/L (ref 8–16)
BASOPHILS # BLD AUTO: 0.01 K/UL (ref 0–0.2)
BASOPHILS NFR BLD: 0.2 % (ref 0–1.9)
BUN SERPL-MCNC: 69 MG/DL (ref 8–23)
CALCIUM SERPL-MCNC: 8.7 MG/DL (ref 8.7–10.5)
CHLORIDE SERPL-SCNC: 95 MMOL/L (ref 95–110)
CO2 SERPL-SCNC: 19 MMOL/L (ref 23–29)
CREAT SERPL-MCNC: 3.3 MG/DL (ref 0.5–1.4)
DIFFERENTIAL METHOD BLD: ABNORMAL
EOSINOPHIL # BLD AUTO: 0 K/UL (ref 0–0.5)
EOSINOPHIL NFR BLD: 0 % (ref 0–8)
ERYTHROCYTE [DISTWIDTH] IN BLOOD BY AUTOMATED COUNT: 19.6 % (ref 11.5–14.5)
EST. GFR  (NO RACE VARIABLE): 19 ML/MIN/1.73 M^2
GLUCOSE SERPL-MCNC: 105 MG/DL (ref 70–110)
HCT VFR BLD AUTO: 34.8 % (ref 40–54)
HGB BLD-MCNC: 11.3 G/DL (ref 14–18)
IMM GRANULOCYTES # BLD AUTO: 0.02 K/UL (ref 0–0.04)
IMM GRANULOCYTES NFR BLD AUTO: 0.4 % (ref 0–0.5)
LYMPHOCYTES # BLD AUTO: 0.6 K/UL (ref 1–4.8)
LYMPHOCYTES NFR BLD: 12.8 % (ref 18–48)
MAGNESIUM SERPL-MCNC: 1.8 MG/DL (ref 1.6–2.6)
MCH RBC QN AUTO: 27.8 PG (ref 27–31)
MCHC RBC AUTO-ENTMCNC: 32.5 G/DL (ref 32–36)
MCV RBC AUTO: 86 FL (ref 82–98)
MONOCYTES # BLD AUTO: 0.3 K/UL (ref 0.3–1)
MONOCYTES NFR BLD: 6.1 % (ref 4–15)
NEUTROPHILS # BLD AUTO: 4 K/UL (ref 1.8–7.7)
NEUTROPHILS NFR BLD: 80.5 % (ref 38–73)
NRBC BLD-RTO: 0 /100 WBC
PHOSPHATE SERPL-MCNC: 5.2 MG/DL (ref 2.7–4.5)
PLATELET # BLD AUTO: 105 K/UL (ref 150–450)
PMV BLD AUTO: ABNORMAL FL (ref 9.2–12.9)
POCT GLUCOSE: 162 MG/DL (ref 70–110)
POCT GLUCOSE: 171 MG/DL (ref 70–110)
POCT GLUCOSE: 81 MG/DL (ref 70–110)
POCT GLUCOSE: 82 MG/DL (ref 70–110)
POTASSIUM SERPL-SCNC: 5.4 MMOL/L (ref 3.5–5.1)
RBC # BLD AUTO: 4.07 M/UL (ref 4.6–6.2)
SODIUM SERPL-SCNC: 130 MMOL/L (ref 136–145)
WBC # BLD AUTO: 4.91 K/UL (ref 3.9–12.7)

## 2025-01-29 PROCEDURE — 36415 COLL VENOUS BLD VENIPUNCTURE: CPT | Mod: HCNC | Performed by: HOSPITALIST

## 2025-01-29 PROCEDURE — 25000003 PHARM REV CODE 250: Mod: HCNC | Performed by: HOSPITALIST

## 2025-01-29 PROCEDURE — 63600175 PHARM REV CODE 636 W HCPCS: Mod: HCNC | Performed by: HOSPITALIST

## 2025-01-29 PROCEDURE — 99233 SBSQ HOSP IP/OBS HIGH 50: CPT | Mod: HCNC,,, | Performed by: INTERNAL MEDICINE

## 2025-01-29 PROCEDURE — 99233 SBSQ HOSP IP/OBS HIGH 50: CPT | Mod: HCNC,,, | Performed by: SURGERY

## 2025-01-29 PROCEDURE — 25000003 PHARM REV CODE 250: Mod: HCNC | Performed by: NURSE PRACTITIONER

## 2025-01-29 PROCEDURE — 21400001 HC TELEMETRY ROOM: Mod: HCNC

## 2025-01-29 PROCEDURE — 84100 ASSAY OF PHOSPHORUS: CPT | Mod: HCNC | Performed by: HOSPITALIST

## 2025-01-29 PROCEDURE — 85025 COMPLETE CBC W/AUTO DIFF WBC: CPT | Mod: HCNC | Performed by: HOSPITALIST

## 2025-01-29 PROCEDURE — 83735 ASSAY OF MAGNESIUM: CPT | Mod: HCNC | Performed by: HOSPITALIST

## 2025-01-29 PROCEDURE — 80048 BASIC METABOLIC PNL TOTAL CA: CPT | Mod: HCNC | Performed by: HOSPITALIST

## 2025-01-29 RX ORDER — DOCUSATE SODIUM 100 MG/1
100 CAPSULE, LIQUID FILLED ORAL 2 TIMES DAILY
Status: DISCONTINUED | OUTPATIENT
Start: 2025-01-29 | End: 2025-01-30 | Stop reason: HOSPADM

## 2025-01-29 RX ORDER — MIDODRINE HYDROCHLORIDE 2.5 MG/1
2.5 TABLET ORAL 2 TIMES DAILY WITH MEALS
Status: DISCONTINUED | OUTPATIENT
Start: 2025-01-29 | End: 2025-01-30 | Stop reason: HOSPADM

## 2025-01-29 RX ADMIN — PRAVASTATIN SODIUM 40 MG: 20 TABLET ORAL at 10:01

## 2025-01-29 RX ADMIN — APIXABAN 5 MG: 2.5 TABLET, FILM COATED ORAL at 08:01

## 2025-01-29 RX ADMIN — PREDNISONE 10 MG: 10 TABLET ORAL at 10:01

## 2025-01-29 RX ADMIN — AMIODARONE HYDROCHLORIDE 200 MG: 200 TABLET ORAL at 10:01

## 2025-01-29 RX ADMIN — ALLOPURINOL 300 MG: 300 TABLET ORAL at 10:01

## 2025-01-29 RX ADMIN — AMIODARONE HYDROCHLORIDE 200 MG: 200 TABLET ORAL at 08:01

## 2025-01-29 RX ADMIN — COLCHICINE 0.6 MG: 0.6 TABLET, FILM COATED ORAL at 10:01

## 2025-01-29 RX ADMIN — TAMSULOSIN HYDROCHLORIDE 0.4 MG: 0.4 CAPSULE ORAL at 10:01

## 2025-01-29 RX ADMIN — SODIUM BICARBONATE 1300 MG: 650 TABLET ORAL at 10:01

## 2025-01-29 RX ADMIN — BRIMONIDINE TARTRATE 1 DROP: 2 SOLUTION/ DROPS OPHTHALMIC at 10:01

## 2025-01-29 RX ADMIN — APIXABAN 5 MG: 2.5 TABLET, FILM COATED ORAL at 10:01

## 2025-01-29 RX ADMIN — MIDODRINE HYDROCHLORIDE 2.5 MG: 2.5 TABLET ORAL at 12:01

## 2025-01-29 RX ADMIN — MIDODRINE HYDROCHLORIDE 2.5 MG: 2.5 TABLET ORAL at 06:01

## 2025-01-29 RX ADMIN — FAMOTIDINE 20 MG: 20 TABLET ORAL at 10:01

## 2025-01-29 RX ADMIN — SODIUM BICARBONATE 1300 MG: 650 TABLET ORAL at 08:01

## 2025-01-29 RX ADMIN — SODIUM ZIRCONIUM CYCLOSILICATE 5 G: 5 POWDER, FOR SUSPENSION ORAL at 10:01

## 2025-01-29 NOTE — PLAN OF CARE
Patient is Aaox4 , care plan reviewed with pt.          Problem: Adult Inpatient Plan of Care  Goal: Plan of Care Review  Outcome: Progressing  Goal: Patient-Specific Goal (Individualized)  Outcome: Progressing  Goal: Absence of Hospital-Acquired Illness or Injury  Outcome: Progressing  Goal: Optimal Comfort and Wellbeing  Outcome: Progressing  Goal: Readiness for Transition of Care  Outcome: Progressing     Problem: Diabetes Comorbidity  Goal: Blood Glucose Level Within Targeted Range  Outcome: Progressing     Problem: Wound  Goal: Optimal Coping  Outcome: Progressing  Goal: Optimal Functional Ability  Outcome: Progressing  Goal: Absence of Infection Signs and Symptoms  Outcome: Progressing  Goal: Improved Oral Intake  Outcome: Progressing  Goal: Optimal Pain Control and Function  Outcome: Progressing  Goal: Skin Health and Integrity  Outcome: Progressing  Goal: Optimal Wound Healing  Outcome: Progressing     Problem: Fall Injury Risk  Goal: Absence of Fall and Fall-Related Injury  Outcome: Progressing     Problem: VTE (Venous Thromboembolism)  Goal: Tissue Perfusion  Outcome: Progressing     Problem: Acute Kidney Injury/Impairment  Goal: Fluid and Electrolyte Balance  Outcome: Progressing  Goal: Improved Oral Intake  Outcome: Progressing  Goal: Effective Renal Function  Outcome: Progressing

## 2025-01-29 NOTE — PROGRESS NOTES
[facility] Surg ([address]  Cardiology  Progress Note    Patient Name: Jarrett Charlton Jr.  MRN: [id number]  Admission Date: 1/24/2025  Hospital Length of Stay: 5 days  Code Status: Full Code   Attending Physician: Poli Damon MD   Primary Care Physician: Tripp Mohan MD  Expected Discharge Date: 1/29/2025  Principal Problem:Acute deep vein thrombosis (DVT) of left lower extremity    Subjective:     Brief HPI:    Jarrett Charlton Jr. is a 74 y.o.male with hypertension, hypercholesterolemia and diabetes mellitus type 2. He has coronary artery disease having received a stent. His ascending aorta is mildly enlarged. He has issues with orthostatic hypotension for which he is on midodrine and used to be on fludrocortisone . He has undergone an ablation for atrial tachycardia. He has an implantable loop recorder. He has an ileostomy in the setting of Crohn's disease, has had prostate cancer and has advanced chronic kidney disease. He goes to the infusion center at [facility] to receive regular infusions. I saw him on 7/6/2024 after he presented to [facility] after having had syncope on 7/5/2024. He tested positive for COVID. I saw him again in 8/2024 as an inpatient when he presented with shortness of breath. In the fall of 2024 he had a deep venous trombosis in his right leg. He began apixiban. Lately, he has been evaluated for recurrence of atrial fibrillation. He began amiodarone. The plan is an ablation. The night of 1/23/2025 he presented to the emergency room at [facility] due to chest pain. His legs were edematous. He had a venous duplex that revealed a nonocclusive thrombus in the left greater saphenous vein. He was transferred to [facility] for his further care.    Hospital Course:     Heparin iv followed by apixiban.    Diuresis.    Interval History:     Remains weak.    Breathing easier.    No further CP.    Edema essentially  resolved.    Not able to eat much..    Review of Systems   Constitutional: Positive for malaise/fatigue. Negative for chills and fever.   HENT:  Negative for nosebleeds.    Eyes:  Negative for vision loss in left eye and vision loss in right eye.   Cardiovascular:  Negative for chest pain, leg swelling, orthopnea, palpitations, paroxysmal nocturnal dyspnea and syncope.   Respiratory:  Negative for cough, hemoptysis, shortness of breath, sputum production and wheezing.    Hematologic/Lymphatic: Negative for bleeding problem. Does not bruise/bleed easily.   Skin:  Negative for color change and rash.   Musculoskeletal:  Negative for muscle weakness and myalgias.   Gastrointestinal:  Positive for bloating, anorexia and constipation. Negative for abdominal pain, heartburn, hematemesis, hematochezia, melena, nausea and vomiting.   Genitourinary:  Negative for hematuria.   Neurological:  Positive for weakness. Negative for dizziness, focal weakness, headaches, light-headedness and vertigo.   Psychiatric/Behavioral:  Negative for altered mental status. The patient is not nervous/anxious.    Allergic/Immunologic: Negative for persistent infections.     Objective:     Vital Signs (Most Recent):  Temp: 97.5 °F (36.4 °C) (01/29/25 0753)  Pulse: 87 (01/29/25 0753)  Resp: 18 (01/29/25 0753)  BP: 124/75 (01/29/25 0753)  SpO2: 95 % (01/29/25 0753) Vital Signs (24h Range):  Temp:  [97.4 °F (36.3 °C)-97.5 °F (36.4 °C)] 97.5 °F (36.4 °C)  Pulse:  [80-96] 87  Resp:  [14-20] 18  SpO2:  [95 %-100 %] 95 %  BP: ()/(60-79) 124/75     Weight: 73 kg (160 lb 15 oz)  Body mass index is 20.66 kg/m².    SpO2: 95 %         Intake/Output Summary (Last 24 hours) at 1/29/2025 0812  Last data filed at 1/29/2025 0539  Gross per 24 hour   Intake 840 ml   Output 600 ml   Net 240 ml       Lines/Drains/Airways       Drain  Duration                  Ileostomy 01/01/84 RUQ 29366 days              Peripheral Intravenous Line  Duration                   Peripheral IV - Single Lumen 01/23/25 20 G Anterior;Left Forearm 6 days                    Physical Exam  Constitutional:       General: He is not in acute distress.     Appearance: Normal appearance. He is well-developed. He is ill-appearing. He is not toxic-appearing or diaphoretic.   HENT:      Head: Normocephalic and atraumatic.      Nose: Nose normal.   Eyes:      General:         Right eye: No discharge.         Left eye: No discharge.      Conjunctiva/sclera:      Right eye: Right conjunctiva is not injected.      Left eye: Left conjunctiva is not injected.      Pupils: Pupils are equal.      Right eye: Pupil is round.      Left eye: Pupil is round.   Neck:      Thyroid: No thyromegaly.      Vascular: No carotid bruit or JVD.   Cardiovascular:      Rate and Rhythm: Normal rate and regular rhythm. No extrasystoles are present.     Chest Wall: PMI is not displaced.      Pulses:           Radial pulses are 2+ on the right side and 2+ on the left side.        Femoral pulses are 2+ on the right side and 2+ on the left side.       Dorsalis pedis pulses are 1+ on the right side and 1+ on the left side.        Posterior tibial pulses are 1+ on the right side and 1+ on the left side.      Heart sounds: S1 normal and S2 normal. Murmur heard.      High-pitched blowing holosystolic murmur is present with a grade of 2/6 at the apex.      No gallop.   Pulmonary:      Effort: Pulmonary effort is normal.      Breath sounds: Examination of the right-lower field reveals rales. Examination of the left-lower field reveals rales. Rales present.   Abdominal:      Palpations: Abdomen is soft.      Tenderness: There is no abdominal tenderness.   Musculoskeletal:      Cervical back: Neck supple.      Right lower leg: Swelling present. No edema.      Left lower leg: Swelling present. No edema.   Lymphadenopathy:      Head:      Right side of head: No submandibular adenopathy.      Left side of head: No submandibular adenopathy.       Cervical: No cervical adenopathy.   Skin:     General: Skin is warm and dry.      Findings: No rash.      Nails: There is no clubbing.   Neurological:      General: No focal deficit present.      Mental Status: He is alert and oriented to person, place, and time. He is not disoriented.      Cranial Nerves: No cranial nerve deficit.   Psychiatric:         Attention and Perception: Attention normal.         Mood and Affect: Mood and affect normal.         Speech: Speech normal.         Behavior: Behavior normal.         Thought Content: Thought content normal.         Cognition and Memory: Cognition and memory normal.         Judgment: Judgment normal.         Current Medications:     allopurinoL  300 mg Oral Daily    amiodarone  200 mg Oral BID    apixaban  5 mg Oral BID    brimonidine 0.2%  1 drop Both Eyes BID    colchicine  0.6 mg Oral Every Other Day    famotidine  20 mg Oral Q48H    ferrous sulfate  1 tablet Oral Every other day    metoprolol succinate  25 mg Oral Daily    pravastatin  40 mg Oral Daily    predniSONE  10 mg Oral Daily    sodium bicarbonate  1,300 mg Oral BID    sodium zirconium cyclosilicate  5 g Oral Once    tamsulosin  0.4 mg Oral Daily     Current Laboratory Results:    Recent Results (from the past 24 hours)   POCT glucose    Collection Time: 01/28/25 11:49 AM   Result Value Ref Range    POCT Glucose 171 (H) 70 - 110 mg/dL   POCT glucose    Collection Time: 01/28/25  3:25 PM   Result Value Ref Range    POCT Glucose 175 (H) 70 - 110 mg/dL   POCT glucose    Collection Time: 01/28/25  7:46 PM   Result Value Ref Range    POCT Glucose 150 (H) 70 - 110 mg/dL   CBC Auto Differential    Collection Time: 01/29/25  5:23 AM   Result Value Ref Range    WBC 4.91 3.90 - 12.70 K/uL    RBC 4.07 (L) 4.60 - 6.20 M/uL    Hemoglobin 11.3 (L) 14.0 - 18.0 g/dL    Hematocrit 34.8 (L) 40.0 - 54.0 %    MCV 86 82 - 98 fL    MCH 27.8 27.0 - 31.0 pg    MCHC 32.5 32.0 - 36.0 g/dL    RDW 19.6 (H) 11.5 - 14.5 %     Platelets 105 (L) 150 - 450 K/uL    MPV SEE COMMENT 9.2 - 12.9 fL    Immature Granulocytes 0.4 0.0 - 0.5 %    Gran # (ANC) 4.0 1.8 - 7.7 K/uL    Immature Grans (Abs) 0.02 0.00 - 0.04 K/uL    Lymph # 0.6 (L) 1.0 - 4.8 K/uL    Mono # 0.3 0.3 - 1.0 K/uL    Eos # 0.0 0.0 - 0.5 K/uL    Baso # 0.01 0.00 - 0.20 K/uL    nRBC 0 0 /100 WBC    Gran % 80.5 (H) 38.0 - 73.0 %    Lymph % 12.8 (L) 18.0 - 48.0 %    Mono % 6.1 4.0 - 15.0 %    Eosinophil % 0.0 0.0 - 8.0 %    Basophil % 0.2 0.0 - 1.9 %    Differential Method Automated    Basic Metabolic Panel    Collection Time: 01/29/25  5:23 AM   Result Value Ref Range    Sodium 130 (L) 136 - 145 mmol/L    Potassium 5.4 (H) 3.5 - 5.1 mmol/L    Chloride 95 95 - 110 mmol/L    CO2 19 (L) 23 - 29 mmol/L    Glucose 105 70 - 110 mg/dL    BUN 69 (H) 8 - 23 mg/dL    Creatinine 3.3 (H) 0.5 - 1.4 mg/dL    Calcium 8.7 8.7 - 10.5 mg/dL    Anion Gap 16 8 - 16 mmol/L    eGFR 19 (A) >60 mL/min/1.73 m^2   Phosphorus    Collection Time: 01/29/25  5:23 AM   Result Value Ref Range    Phosphorus 5.2 (H) 2.7 - 4.5 mg/dL   Magnesium    Collection Time: 01/29/25  5:23 AM   Result Value Ref Range    Magnesium 1.8 1.6 - 2.6 mg/dL   POCT glucose    Collection Time: 01/29/25  7:54 AM   Result Value Ref Range    POCT Glucose 82 70 - 110 mg/dL     Current Imaging Results:    X-Ray Abdomen AP 1 View   Final Result      As above described.         Electronically signed by: Mimi Guadalupe   Date:    01/28/2025   Time:    17:21      MRI Abdomen Pelvis Without Contrast (XPD)   Final Result      1. No evidence for intra-abdominal solid mass, noting low sensitivity of noncontrast examination.   2. Nodular liver contour with trace perihepatic fluid suspicious for cirrhosis.   3. Pancreatic cystic lesion, similar to appearance on prior CT and suboptimally characterized on noncontrast assessment.  Recommend further evaluation with dedicated contrast enhanced MRI with MRCP.   4. Bilateral renal cysts.   5. Additional  findings above.         Electronically signed by: Adin Ramirez MD   Date:    01/25/2025   Time:    08:54      NM Lung Scan Ventilation Perfusion   Final Result      Low intermediate probability (20-50%) of acute pulmonary embolism.         Electronically signed by: Daniel Velazquez MD   Date:    01/24/2025   Time:    12:49      XR Small Bowel Follow Through    (Results Pending)       4/15/2021: Cath:    The pre-procedure left ventricular end diastolic pressure was 12.  The ejection fraction was greater than 55% by visual estimate.  Patent stent in the mid LAD with 20% stenosis just distal to this. IFR was point 9 3  Circumflex with 20-30% mid stenosis IFR 0.99  Normal right coronary artery  Planned medical therapy continue amiodarone for ventricular tachycardia        8/5/2024: ECG: ST with APCs. RBBB. LAFB.        8/5/2024: Echo:  Left Ventricle: The left ventricle is normal in size. Moderately increased wall thickness. There is concentric hypertrophy. Moderate global hypokinesis present. There is moderately reduced systolic function. Ejection fraction by visual approximation is 35%. Grade II diastolic dysfunction. Elevated left ventricular filling pressure. Tissue Doppler velocity is reduced.  Right Ventricle: Normal right ventricular cavity size. Wall thickness is normal. Systolic function is normal.  Left Atrium: Left atrium is severely dilated.  Right Atrium: Right atrium is moderately dilated.  Aortic Valve: There is mild aortic valve sclerosis. Mildly restricted motion.  Mitral Valve: There is moderate regurgitation with a centrally directed jet.  Tricuspid Valve: There is mild to moderate regurgitation with a centrally directed jet.  Pulmonary Artery: There is severe pulmonary hypertension. The estimated pulmonary artery systolic pressure is 79 mmHg.  IVC/SVC: Intermediate venous pressure at 8 mmHg.  Pericardium: There is a small posterior effusion.       1/24/2025: Echo:  Left Ventricle: The left ventricle is  mildly dilated. Ventricular mass is normal. Normal wall thickness. Severe global hypokinesis present. There is severely reduced systolic function. Ejection fraction is approximately 30%. Grade I diastolic dysfunction.  Right Ventricle: Normal right ventricular cavity size. Wall thickness is normal. Right ventricle wall motion has global hypokinesis. Systolic function is moderately reduced.  Left Atrium: Left atrium is severely dilated.  Right Atrium: Right atrium is moderately dilated.  Aortic Valve: The aortic valve is a trileaflet valve. There is mild aortic valve sclerosis. There is mild aortic regurgitation with a centrally directed jet.  Mitral Valve: There is severe regurgitation with a centrally directed jet.  Tricuspid Valve: There is moderate to severe regurgitation with a centrally directed jet.  Pulmonary Artery: There is mild to moderate pulmonary hypertension. The estimated pulmonary artery systolic pressure is 42 mmHg.  IVC/SVC: Elevated venous pressure at 15 mmHg.  Pericardium: There is a small circumferential effusion. Left pleural effusion.      Assessment and Plan:     Problem List:    Active Diagnoses:    Diagnosis Date Noted POA    PRINCIPAL PROBLEM:  Acute deep vein thrombosis (DVT) of left lower extremity [I82.402] 01/24/2025 Yes    Thrombocytopenia [D69.6] 01/28/2025 Yes    Pancreatic lesion [K86.9] 01/25/2025 Yes    Pseudogout [M11.20] 11/19/2024 Yes    Moderate malnutrition [E44.0] 10/03/2024 Yes    Acute on chronic combined systolic and diastolic heart failure [I50.43] 09/11/2024 Yes    Acute renal failure superimposed on stage 4 chronic kidney disease [N17.9, N18.4] 02/11/2021 Yes    Type 2 diabetes mellitus [E11.9] 09/30/2020 Yes    High output ileostomy [R19.8, Z93.2] 02/04/2019 Not Applicable    Essential hypertension [I10]  Yes      Problems Resolved During this Admission:    Diagnosis Date Noted Date Resolved POA    HLD (hyperlipidemia) [E78.5]  01/25/2025 Yes     Assessment and  Plan:     1. Heart Failure, Systolic, Acute on Chronic              8/5/2024: Echo: Normal left ventricular size and with moderate systolic dysfunction. EF 35%. Moderate LVH. Moderate diastolic dysfunction. Severely dilated LA. Moderate MR. SPAP 79 mmHg.              1/24/2025: Echo: Mildly dilated left ventricle with severe systolic dysfunction. EF 30%. Mild diastolic dysfunction. Severely dilated LA. Moderate RV dysfunction. Mild aortic valve sclerosis. Severe MR. Moderate to severe TR.  SPAP 42 mmHg. RA 15 mmHg. Small PE.              1/24/2025: Fluid overloaded in HF. BNP 2,586. Received furosemide 40 mg iv Q24.              On metoprolol 25 mg Q24.              Difficult to manage with advanced CKD and issues with hypotension.   Still some fluid overload.     2. Mitral Regurgitation              1/24/2025: Echo: Severe MR. Moderate to severe TR. SPAP 42 mmHg.              Diuresis.     3. Coronary Artery Disease              4/15/2021: Cath: Nonobstructive disease with patent LAD stent.              Not on aspirin 81 mg Q24 as on apixiban.              Appears stable.     4. Chest Pain              1/24/2025: CP.              Appeared atypical.     5. Atrial Fibrillation              Paroxysmal AF.              On apixiban.              On metoprolol 25 mg Q24 and amiodarone 200 mg Q12.              No recent clinical recurrence.              Plan is ablation.     6. High Risk Medication              On amiodarone.     7. History of Atrial tachycardia              Underwent ablation.     8. Chronic Anticoagulation              On apixiban 5 mg Q12.     9. History of Deep Venous Trombosis of Lower Extremity              2024: Right leg DVT.              On apixiban 5 mg Q12.     10. Left Leg Thrombosis  1/24/2025: Asymptomatic thrombus in left greater saphenous vein.  On apixiban.            11. History of Syncope  7/5/2024: Syncope  Occurred after episode of diaphoresis in setting of testing  positive for COVID  and having orthostatic hypotension.     12. Orthostatic Hypotension              On midodrine.  Used to be on fludrocortisone 100 mcg Q24.     13. Hypertension              At home was on metoprolol 25 mg Q24 and furosemide 40 mg Q24.     14. Hypercholesterolemia              On pravastatin 40 mg Q24.     15. Diabetes Mellitus, Type 2     16. Chronic Kidney Disease, Stage 4              8/8/2024: BUN/crea 45/2.6. eGFR 25.              1/24/2025: BUN/crea 54/2.6. eGFR 25.   1/27/2025: BUN/crea 63/3.2. eGFR 19. K 5.2.   1/29/2025: BUN/crea 69/3.3. eGFR 19. K 5.4.   Very concerning.                 17. Prostate Cancer     18. Ileostomy     19. Crohn's Disease                VTE Risk Mitigation (From admission, onward)           Ordered     apixaban tablet 5 mg  2 times daily         01/26/25 0853     Reason for No Pharmacological VTE Prophylaxis  Once        Question:  Reasons:  Answer:  Already adequately anticoagulated on oral Anticoagulants    01/24/25 0305     IP VTE HIGH RISK PATIENT  Once         01/24/25 0305     Place sequential compression device  Until discontinued         01/24/25 0305                    Almaz Hernández MD  Cardiology  [facility] Surg ([address])

## 2025-01-29 NOTE — CONSULTS
[facility] [place], [place] Surg ([address])  General Surgery  Consult Note    Patient Name: Jarrett Charlton Jr.  MRN: [id number]  Code Status: Full Code  Admission Date: 1/24/2025  Hospital Length of Stay: 5 days  Attending Physician: Poli Damon MD  Primary Care Provider: Tripp Mohan MD    Patient information was obtained from patient and ER records.     Inpatient consult to General Surgery  Consult performed by: Marbin Lake MD  Consult ordered by: Poli Damon MD  Reason for consult: Possible small bowel obstruction  Assessment/Recommendations: After Gastrografin challenge base status ileostomy output   No need for surgical intervention.            Subjective:     Principal Problem: Acute deep vein thrombosis (DVT) of left lower extremity    History of Present Illness: 74 year old male with a past medical history of CAD status post PCI to mid LAD in 2017, Hx of VT, Atrial tachycardia s/p AT ablation in 4/2024 and DVT on Eliquis, CHF with the EF of 35%, SBO, s/p colostomy, hypertension, hyperlipidemia, and DM2 who presented to the ED at [facility] with reports of cough, congestion and bilateral chest pain.  His chest pain increased with coughing and moving his body as well as taking a deep breath over the last few days. Patient states that the chest started on left side of his chest and went to the right side. He states it feels like an aching. He states that his easy to breathe through his mouth, but he feels like he cannot breathe through his nose. He feels that the swelling in his legs has worsened.  While in the ED, it was discovered that he has a DVT to the left lower extremity and decision was made to transfer to [facility] for possible IVC filter placement.     On arrival to [facility], the patient had severe chest pain radiating into the center of the chest.  Reports worsened shortness of breath.  He will be admitted to [facility] for further management of his acute chest pain and  "Cardiology consult."    Current Facility-Administered Medications on File Prior to Encounter   Medication    sodium chloride 0.9% in 1,000 mL infusion     Current Outpatient Medications on File Prior to Encounter   Medication Sig    ACCU-CHEK PAUL CONTROL SOLN Soln 1 drop by NOT APPLICABLE route once daily.    ACCU-CHEK SOFTCLIX LANCETS [facility] TEST BLOOD SUGAR THREE TIMES DAILY    alcohol swabs (DROPSAFE ALCOHOL PREP PADS) PadM Apply 1 each topically once daily.    allopurinoL (ZYLOPRIM) 100 MG tablet Take 4 tablets (400 mg total) by mouth once daily.    amiodarone (PACERONE) 200 MG Tab Take 1 tablet (200 mg total) by mouth 2 (two) times daily.    apixaban (ELIQUIS) 5 mg Tab Take 1 tablet (5 mg total) by mouth 2 (two) times daily.    aspirin (ECOTRIN) 81 MG EC tablet Take 1 tablet (81 mg total) by mouth once daily.    blood sugar diagnostic (ACCU-CHEK GUIDE TEST STRIPS) Strp 1 each by Other route 3 (three) times daily. Test Blood Sugar    blood-glucose meter (ACCU-CHEK GUIDE GLUCOSE METER) [facility] Accucheck BID    brimonidine 0.2% (ALPHAGAN) 0.2 % Drop Place 1 drop into both eyes 2 (two) times a day.    colchicine (COLCRYS) 0.6 mg tablet Take 1 tablet (0.6 mg total) by mouth every other day.    famotidine (PEPCID) 20 MG tablet Take 1 tablet (20 mg total) by mouth once daily.    ferrous sulfate (IRON) 325 mg (65 mg iron) Tab tablet Take 1 tablet (325 mg total) by mouth every other day.    furosemide (LASIX) 40 MG tablet Take 1 tablet (40 mg total) by mouth 2 (two) times daily.    glimepiride (AMARYL) 4 MG tablet TAKE 1 TABLET TWICE DAILY BEFORE MEALS    hydrALAZINE (APRESOLINE) 10 MG tablet Take by mouth.    HYDROcodone-acetaminophen (NORCO) 5-325 mg per tablet Take 1 tablet by mouth every 12 (twelve) hours as needed for Pain. (Patient not taking: Reported on 1/7/2025)    LIDOcaine (LIDODERM) 5 % Place 1 patch onto the skin once daily. Remove & Discard patch within 12 hours or as directed by MD (Patient not taking: " Reported on 1/7/2025)    magnesium oxide (MAG-OX) 400 mg (241.3 mg magnesium) tablet Take 400 mg by mouth once daily.    metoprolol succinate (TOPROL-XL) 25 MG 24 hr tablet Take 1 tablet (25 mg total) by mouth once daily.    midodrine (PROAMATINE) 10 MG tablet Take 1 tablet (10 mg total) by mouth 3 (three) times daily with meals.    ondansetron (ZOFRAN) 4 MG tablet Take 1 tablet (4 mg total) by mouth every 8 (eight) hours as needed.    pantoprazole (PROTONIX) 40 MG tablet TAKE 1 TABLET ONE TIME DAILY    pravastatin (PRAVACHOL) 40 MG tablet Take 1 tablet (40 mg total) by mouth once daily.    sodium bicarbonate 650 MG tablet Take 2 tablets (1,300 mg total) by mouth 2 (two) times daily.    tamsulosin (FLOMAX) 0.4 mg Cap Take 1 capsule (0.4 mg total) by mouth once daily.    temazepam (RESTORIL) 15 mg Cap Take 1 capsule (15 mg total) by mouth nightly as needed.    traMADoL (ULTRAM) 50 mg tablet Take 1 tablet (50 mg total) by mouth every 8 (eight) hours as needed for Pain.       Review of patient's allergies indicates:   Allergen Reactions    Atorvastatin     Rosuvastatin        Past Medical History:   Diagnosis Date    Anticoagulant long-term use     Basal cell carcinoma     BPH (benign prostatic hyperplasia)     s/p TURP    Carotid stenosis     Chronic kidney disease     Claudication     DDD (degenerative disc disease) 10/21/2013    Diabetes mellitus with renal complications     Disc disease, degenerative, cervical     DVT (deep venous thrombosis)     Encounter for blood transfusion     GERD (gastroesophageal reflux disease)     Gout, chronic     History of ulcerative colitis     s/p colectomy and ileostomy    HLD (hyperlipidemia)     HTN (hypertension)     Ileostomy in place 1982    Paroxysmal atrial fibrillation     RBBB     Squamous cell carcinoma 03/08/2018    Left superior helix near insertion    Squamous cell carcinoma 04/12/2018    Left forearm x 5    Syncope 07/06/2024    Ventricular tachycardia      Past  Surgical History:   Procedure Laterality Date    ABLATION, SVT, ACCESSORY PATHWAY N/A 4/30/2024    Procedure: Ablation, SVT, Accessory Pathway;  Surgeon: Franky Taylor MD;  Location: [facility] EP LAB;  Service: Cardiology;  Laterality: N/A;  VT, RFA, Carto, MAC, GP, 3 Prep *MDT ILR in situ*    cardiac stents      CATARACT EXTRACTION Bilateral     CERVICAL FUSION      CHOLECYSTECTOMY N/A 2/14/2023    Procedure: CHOLECYSTECTOMY;  Surgeon: Mike Joyce MD;  Location: [facility] OR;  Service: General;  Laterality: N/A;  very high probabilty of converison to open    colectomy and ileostomy  1985    ENDOSCOPIC ULTRASOUND OF UPPER GASTROINTESTINAL TRACT N/A 2/10/2023    Procedure: ULTRASOUND, UPPER GI TRACT, ENDOSCOPIC;  Surgeon: Poli Fuller MD;  Location: [facility] ENDO;  Service: Endoscopy;  Laterality: N/A;    ERCP N/A 2/10/2023    Procedure: ERCP (ENDOSCOPIC RETROGRADE CHOLANGIOPANCREATOGRAPHY);  Surgeon: Poli Fuller MD;  Location: [facility] ENDO;  Service: Endoscopy;  Laterality: N/A;    EXCISION OF PAROTID GLAND Left 12/18/2020    Procedure: EXCISION, PAROTID GLAND;  Surgeon: Michael Pinzon MD;  [address];  Service: ENT;  Laterality: Left;    INSERTION OF IMPLANTABLE LOOP RECORDER  06/07/2021    INSERTION OF IMPLANTABLE LOOP RECORDER Left 6/7/2021    Procedure: INSERTION, IMPLANTABLE LOOP RECORDER;  Surgeon: Romario Dao MD;  Location: [facility] CATH LAB;  Service: Cardiology;  Laterality: Left;    LEFT HEART CATHETERIZATION Right 4/15/2021    Procedure: CATHETERIZATION, HEART, LEFT;  Surgeon: Marcio Jones MD;  Location: [facility] CATH LAB;  Service: Cardiology;  Laterality: Right;    LYSIS OF ADHESIONS N/A 11/9/2020    Procedure: LYSIS, ADHESIONS,  ERAS low;  Surgeon: CED Haley MD;  Location: [facility] OR 2ND FLR;  Service: Colon and Rectal;  Laterality: N/A;    LYSIS OF ADHESIONS N/A 2/14/2023    Procedure: LYSIS, ADHESIONS;  Surgeon: Mike Joyce MD;  Location: [facility] OR;  Service: General;   Laterality: N/A;    POUCHOSCOPY N/A 4/6/2022    Procedure: ENDOSCOPY, POUCH, SMALL INTESTINE, DIAGNOSTIC;  Surgeon: Dieter Juarez MD;  Location: [facility] ENDO (2ND FLR);  Service: Endoscopy;  Laterality: N/A;    REPAIR, HERNIA, PARASTOMAL N/A 11/9/2020    Procedure: REPAIR, HERNIA, PARASTOMAL;  Surgeon: CED Haley MD;  Location: [facility] OR 2ND FLR;  Service: Colon and Rectal;  Laterality: N/A;    TRANSURETHRAL RESECTION OF PROSTATE (TURP) WITHOUT USE OF LASER N/A 1/23/2019    Procedure: TURP, WITHOUT USING LASER BIPOLAR;  Surgeon: Catarino Mota MD;  Location: [facility] OR 1ST FLR;  Service: Urology;  Laterality: N/A;  1.5 HOURS    TREATMENT OF CARDIAC ARRHYTHMIA  4/30/2024    Procedure: Cardioversion or Defibrillation;  Surgeon: Franky Taylor MD;  Location: [facility] EP LAB;  Service: Cardiology;;     Family History       Problem Relation (Age of Onset)    Cancer Father    Dementia Mother    Diabetes Father    Heart disease Father          Tobacco Use    Smoking status: Never     Passive exposure: Never    Smokeless tobacco: Never   Substance and Sexual Activity    Alcohol use: No    Drug use: No    Sexual activity: Not Currently     Partners: Female     Review of Systems   Constitutional:  Negative for appetite change, fatigue, fever and unexpected weight change.   HENT:  Negative for sore throat and trouble swallowing.    Eyes: Negative.    Respiratory:  Negative for cough, shortness of breath and wheezing.    Cardiovascular:  Negative for chest pain and leg swelling.   Gastrointestinal:  Negative for abdominal distention, abdominal pain, blood in stool, constipation, diarrhea, nausea and vomiting.   Endocrine: Negative.    Genitourinary: Negative.    Musculoskeletal:  Negative for back pain.   Skin: Negative.  Negative for rash.   Allergic/Immunologic: Negative.    Neurological: Negative.    Hematological: Negative.    Psychiatric/Behavioral:  Negative for confusion.    Objective:     Vital Signs (Most  Recent):  Temp: 97.5 °F (36.4 °C) (01/29/25 0753)  Pulse: 73 (01/29/25 1535)  Resp: 16 (01/29/25 1104)  BP: (!) 90/57 (01/29/25 1104)  SpO2: 98 % (01/29/25 1104) Vital Signs (24h Range):  Temp:  [97.4 °F (36.3 °C)-97.5 °F (36.4 °C)] 97.5 °F (36.4 °C)  Pulse:  [67-96] 73  Resp:  [14-19] 16  SpO2:  [95 %-100 %] 98 %  BP: ()/(57-79) 90/57     Weight: 73 kg (160 lb 15 oz)  Body mass index is 20.66 kg/m².     Physical Exam  Vitals and nursing note reviewed.   Constitutional:       Appearance: He is well-developed.   HENT:      Head: Normocephalic and atraumatic.   Cardiovascular:      Rate and Rhythm: Normal rate.      Heart sounds: Normal heart sounds.   Pulmonary:      Effort: Pulmonary effort is normal.   Abdominal:      General: Bowel sounds are normal. There is no distension.      Palpations: Abdomen is soft.      Tenderness: There is no abdominal tenderness.   Musculoskeletal:         General: Normal range of motion.      Cervical back: Normal range of motion.   Skin:     General: Skin is warm and dry.      Capillary Refill: Capillary refill takes less than 2 seconds.             Comments: Ileostomy with liquid   Neurological:      Mental Status: He is alert and oriented to person, place, and time.   Psychiatric:         Behavior: Behavior normal.        I have reviewed all pertinent lab results within the past 24 hours.  CBC:   Recent Labs   Lab 01/29/25  0523   WBC 4.91   RBC 4.07*   HGB 11.3*   HCT 34.8*   *   MCV 86   MCH 27.8   MCHC 32.5     CMP:   Recent Labs   Lab 01/26/25  0400 01/27/25  1054 01/29/25  0523   GLU 90   < > 105   CALCIUM 8.4*   < > 8.7   ALBUMIN 2.4*  --   --    PROT 5.6*  --   --       < > 130*   K 4.2   < > 5.4*   CO2 23   < > 19*      < > 95   BUN 50*   < > 69*   CREATININE 2.4*   < > 3.3*   ALKPHOS 114  --   --    ALT 12  --   --    AST 12  --   --    BILITOT 0.8  --   --     < > = values in this interval not displayed.       Significant Diagnostics:  I have  reviewed all pertinent imaging results/findings within the past 24 hours.  CXR: I have reviewed all pertinent results/findings within the past 24 hours and my personal findings are:  Gastrografin challenge does not show an obstruction       Assessment/Plan:     High output ileostomy  No sign of obstruction on imaging and ileostomy started putting out more.    Patient does not have any distention.    No need for surgical intervention.          VTE Risk Mitigation (From admission, onward)           Ordered     Place KAREN hose  Until discontinued         01/29/25 0936     apixaban tablet 5 mg  2 times daily         01/26/25 0853     Reason for No Pharmacological VTE Prophylaxis  Once        Question:  Reasons:  Answer:  Already adequately anticoagulated on oral Anticoagulants    01/24/25 0305     IP VTE HIGH RISK PATIENT  Once         01/24/25 0305     Place sequential compression device  Until discontinued         01/24/25 0305                    Thank you for your consult. I will follow-up with patient. Please contact us if you have any additional questions.    Marbin Lake MD  General Surgery  [facility] Surg ([address])

## 2025-01-29 NOTE — SUBJECTIVE & OBJECTIVE
Interval History:  No acute events overnight.  Not eating much - no appetite.  Reports decreased ostomy output.  Had some abdominal discomfot / bloating yesterday but not today.  States he is breathing more comfortably and his right hand pain has nearly resolved.  All questions answered and patient had no further complaints.    Objective:     Vital Signs (Most Recent):  Temp: 97.5 °F (36.4 °C) (01/29/25 0753)  Pulse: 75 (01/29/25 1104)  Resp: 16 (01/29/25 1104)  BP: (!) 90/57 (01/29/25 1104)  SpO2: 98 % (01/29/25 1104) Vital Signs (24h Range):  Temp:  [97.4 °F (36.3 °C)-97.5 °F (36.4 °C)] 97.5 °F (36.4 °C)  Pulse:  [67-96] 75  Resp:  [14-19] 16  SpO2:  [95 %-100 %] 98 %  BP: ()/(57-79) 90/57     Weight: 73 kg (160 lb 15 oz)  Body mass index is 20.66 kg/m².    Intake/Output Summary (Last 24 hours) at 1/29/2025 1324  Last data filed at 1/29/2025 0800  Gross per 24 hour   Intake 610 ml   Output 600 ml   Net 10 ml         Physical Exam  Constitutional:       General: He is not in acute distress.     Appearance: He is ill-appearing. He is not toxic-appearing.   HENT:      Mouth/Throat:      Mouth: Mucous membranes are dry.   Eyes:      Extraocular Movements: Extraocular movements intact.   Cardiovascular:      Rate and Rhythm: Normal rate and regular rhythm.      Heart sounds: Murmur heard.   Pulmonary:      Effort: Pulmonary effort is normal. No respiratory distress.      Breath sounds: No wheezing.      Comments: Faint bibasilar crackles  Abdominal:      General: Bowel sounds are normal. There is no distension.      Palpations: Abdomen is soft.      Tenderness: There is no abdominal tenderness.      Comments: Ostomy appears well perfused.  Scant stool / gas in ostomy bag today.  Abdomen is soft without distention and bowel sounds are normal.   Musculoskeletal:         General: Normal range of motion.      Cervical back: Normal range of motion.      Comments: Still with some pitting edema to shins - but clearly  improved with wrinkling   Skin:     General: Skin is warm and dry.      Coloration: Skin is not pale.      Findings: No erythema or rash.   Neurological:      Mental Status: He is alert and oriented to person, place, and time.   Psychiatric:      Comments: Dysphoric mood             Significant Labs: All pertinent labs within the past 24 hours have been reviewed.    Significant Imaging: I have reviewed all pertinent imaging results/findings within the past 24 hours.

## 2025-01-29 NOTE — HOSPITAL COURSE
Patient passed a Gastrografin challenge today and has ileostomy output.    We will give patient nutritional supplements such as boost and food as tolerated.

## 2025-01-29 NOTE — PLAN OF CARE
Medicare Message     Important Message from Medicare regarding Discharge Appeal Rights -- Explained to patient/caregiver; Signed/date by patient/caregiver   Date IMM was signed -- 1/29/2025   Time IMM was signed -- 103

## 2025-01-29 NOTE — SUBJECTIVE & OBJECTIVE
Current Facility-Administered Medications on File Prior to Encounter   Medication    sodium chloride 0.9% in 1,000 mL infusion     Current Outpatient Medications on File Prior to Encounter   Medication Sig    ACCU-CHEK PAUL CONTROL SOLN Soln 1 drop by NOT APPLICABLE route once daily.    ACCU-CHEK SOFTCLIX LANCETS [facility] [place] BLOOD SUGAR THREE TIMES DAILY    alcohol swabs (DROPSAFE ALCOHOL PREP PADS) PadM Apply 1 each topically once daily.    allopurinoL (ZYLOPRIM) 100 MG tablet Take 4 tablets (400 mg total) by mouth once daily.    amiodarone (PACERONE) 200 MG Tab Take 1 tablet (200 mg total) by mouth 2 (two) times daily.    apixaban (ELIQUIS) 5 mg Tab Take 1 tablet (5 mg total) by mouth 2 (two) times daily.    aspirin (ECOTRIN) 81 MG EC tablet Take 1 tablet (81 mg total) by mouth once daily.    blood sugar diagnostic (ACCU-CHEK GUIDE TEST STRIPS) Strp 1 each by Other route 3 (three) times daily. Test Blood Sugar    blood-glucose meter (ACCU-CHEK GUIDE GLUCOSE METER) [facility] Accucheck BID    brimonidine 0.2% (ALPHAGAN) 0.2 % Drop Place 1 drop into both eyes 2 (two) times a day.    colchicine (COLCRYS) 0.6 mg tablet Take 1 tablet (0.6 mg total) by mouth every other day.    famotidine (PEPCID) 20 MG tablet Take 1 tablet (20 mg total) by mouth once daily.    ferrous sulfate (IRON) 325 mg (65 mg iron) Tab tablet Take 1 tablet (325 mg total) by mouth every other day.    furosemide (LASIX) 40 MG tablet Take 1 tablet (40 mg total) by mouth 2 (two) times daily.    glimepiride (AMARYL) 4 MG tablet TAKE 1 TABLET TWICE DAILY BEFORE MEALS    hydrALAZINE (APRESOLINE) 10 MG tablet Take by mouth.    HYDROcodone-acetaminophen (NORCO) 5-325 mg per tablet Take 1 tablet by mouth every 12 (twelve) hours as needed for Pain. (Patient not taking: Reported on 1/7/2025)    LIDOcaine (LIDODERM) 5 % Place 1 patch onto the skin once daily. Remove & Discard patch within 12 hours or as directed by MD (Patient not taking: Reported on 1/7/2025)     magnesium oxide (MAG-OX) 400 mg (241.3 mg magnesium) tablet Take 400 mg by mouth once daily.    metoprolol succinate (TOPROL-XL) 25 MG 24 hr tablet Take 1 tablet (25 mg total) by mouth once daily.    midodrine (PROAMATINE) 10 MG tablet Take 1 tablet (10 mg total) by mouth 3 (three) times daily with meals.    ondansetron (ZOFRAN) 4 MG tablet Take 1 tablet (4 mg total) by mouth every 8 (eight) hours as needed.    pantoprazole (PROTONIX) 40 MG tablet TAKE 1 TABLET ONE TIME DAILY    pravastatin (PRAVACHOL) 40 MG tablet Take 1 tablet (40 mg total) by mouth once daily.    sodium bicarbonate 650 MG tablet Take 2 tablets (1,300 mg total) by mouth 2 (two) times daily.    tamsulosin (FLOMAX) 0.4 mg Cap Take 1 capsule (0.4 mg total) by mouth once daily.    temazepam (RESTORIL) 15 mg Cap Take 1 capsule (15 mg total) by mouth nightly as needed.    traMADoL (ULTRAM) 50 mg tablet Take 1 tablet (50 mg total) by mouth every 8 (eight) hours as needed for Pain.       Review of patient's allergies indicates:   Allergen Reactions    Atorvastatin     Rosuvastatin        Past Medical History:   Diagnosis Date    Anticoagulant long-term use     Basal cell carcinoma     BPH (benign prostatic hyperplasia)     s/p TURP    Carotid stenosis     Chronic kidney disease     Claudication     DDD (degenerative disc disease) 10/21/2013    Diabetes mellitus with renal complications     Disc disease, degenerative, cervical     DVT (deep venous thrombosis)     Encounter for blood transfusion     GERD (gastroesophageal reflux disease)     Gout, chronic     History of ulcerative colitis     s/p colectomy and ileostomy    HLD (hyperlipidemia)     HTN (hypertension)     Ileostomy in place 1982    Paroxysmal atrial fibrillation     RBBB     Squamous cell carcinoma 03/08/2018    Left superior helix near insertion    Squamous cell carcinoma 04/12/2018    Left forearm x 5    Syncope 07/06/2024    Ventricular tachycardia      Past Surgical History:   Procedure  Laterality Date    ABLATION, SVT, ACCESSORY PATHWAY N/A 4/30/2024    Procedure: Ablation, SVT, Accessory Pathway;  Surgeon: Franky Taylor MD;  Location: [facility] EP LAB;  Service: Cardiology;  Laterality: N/A;  VT, RFA, Carto, MAC, GP, 3 Prep *MDT ILR in situ*    cardiac stents      CATARACT EXTRACTION Bilateral     CERVICAL FUSION      CHOLECYSTECTOMY N/A 2/14/2023    Procedure: CHOLECYSTECTOMY;  Surgeon: Mike Joyce MD;  Location: [facility] OR;  Service: General;  Laterality: N/A;  very high probabilty of converison to open    colectomy and ileostomy  1985    ENDOSCOPIC ULTRASOUND OF UPPER GASTROINTESTINAL TRACT N/A 2/10/2023    Procedure: ULTRASOUND, UPPER GI TRACT, ENDOSCOPIC;  Surgeon: Poli Fuller MD;  Location: [facility] ENDO;  Service: Endoscopy;  Laterality: N/A;    ERCP N/A 2/10/2023    Procedure: ERCP (ENDOSCOPIC RETROGRADE CHOLANGIOPANCREATOGRAPHY);  Surgeon: Poli Fuller MD;  Location: [facility] ENDO;  Service: Endoscopy;  Laterality: N/A;    EXCISION OF PAROTID GLAND Left 12/18/2020    Procedure: EXCISION, PAROTID GLAND;  Surgeon: Michael Pinzon MD;  [address]  Service: ENT;  Laterality: Left;    INSERTION OF IMPLANTABLE LOOP RECORDER  06/07/2021    INSERTION OF IMPLANTABLE LOOP RECORDER Left 6/7/2021    Procedure: INSERTION, IMPLANTABLE LOOP RECORDER;  Surgeon: Romario Dao MD;  [place] [facility] [place];  Service: Cardiology;  Laterality: Left;    LEFT HEART CATHETERIZATION Right 4/15/2021    Procedure: CATHETERIZATION, HEART, LEFT;  Surgeon: Marcio Jones MD;  [place] [facility] [place];  Service: Cardiology;  Laterality: Right;    LYSIS OF ADHESIONS N/A 11/9/2020    Procedure: LYSIS, ADHESIONS,  ERAS low;  Surgeon: CED Haley MD;  Location: [facility] OR 2ND FLR;  Service: Colon and Rectal;  Laterality: N/A;    LYSIS OF ADHESIONS N/A 2/14/2023    Procedure: LYSIS, ADHESIONS;  Surgeon: Mike Joyce MD;  Location: [facility] OR;  Service: General;  Laterality: N/A;    POUCHOSCOPY  N/A 4/6/2022    Procedure: ENDOSCOPY, POUCH, SMALL INTESTINE, DIAGNOSTIC;  Surgeon: Dieter Juarez MD;  Location: [facility] ENDO (2ND FLR);  Service: Endoscopy;  Laterality: N/A;    REPAIR, HERNIA, PARASTOMAL N/A 11/9/2020    Procedure: REPAIR, HERNIA, PARASTOMAL;  Surgeon: CED Haley MD;  Location: [facility] OR 2ND FLR;  Service: Colon and Rectal;  Laterality: N/A;    TRANSURETHRAL RESECTION OF PROSTATE (TURP) WITHOUT USE OF LASER N/A 1/23/2019    Procedure: TURP, WITHOUT USING LASER BIPOLAR;  Surgeon: Catarino Mota MD;  Location: [facility] OR 1ST FLR;  Service: Urology;  Laterality: N/A;  1.5 HOURS    TREATMENT OF CARDIAC ARRHYTHMIA  4/30/2024    Procedure: Cardioversion or Defibrillation;  Surgeon: Franky Taylor MD;  Location: [facility] EP LAB;  Service: Cardiology;;     Family History       Problem Relation (Age of Onset)    Cancer Father    Dementia Mother    Diabetes Father    Heart disease Father          Tobacco Use    Smoking status: Never     Passive exposure: Never    Smokeless tobacco: Never   Substance and Sexual Activity    Alcohol use: No    Drug use: No    Sexual activity: Not Currently     Partners: Female     Review of Systems   Constitutional:  Negative for appetite change, fatigue, fever and unexpected weight change.   HENT:  Negative for sore throat and trouble swallowing.    Eyes: Negative.    Respiratory:  Negative for cough, shortness of breath and wheezing.    Cardiovascular:  Negative for chest pain and leg swelling.   Gastrointestinal:  Negative for abdominal distention, abdominal pain, blood in stool, constipation, diarrhea, nausea and vomiting.   Endocrine: Negative.    Genitourinary: Negative.    Musculoskeletal:  Negative for back pain.   Skin: Negative.  Negative for rash.   Allergic/Immunologic: Negative.    Neurological: Negative.    Hematological: Negative.    Psychiatric/Behavioral:  Negative for confusion.    Objective:     Vital Signs (Most Recent):  Temp: 97.5 °F (36.4 °C)  (01/29/25 0753)  Pulse: 73 (01/29/25 1535)  Resp: 16 (01/29/25 1104)  BP: (!) 90/57 (01/29/25 1104)  SpO2: 98 % (01/29/25 1104) Vital Signs (24h Range):  Temp:  [97.4 °F (36.3 °C)-97.5 °F (36.4 °C)] 97.5 °F (36.4 °C)  Pulse:  [67-96] 73  Resp:  [14-19] 16  SpO2:  [95 %-100 %] 98 %  BP: ()/(57-79) 90/57     Weight: 73 kg (160 lb 15 oz)  Body mass index is 20.66 kg/m².     Physical Exam  Vitals and nursing note reviewed.   Constitutional:       Appearance: He is well-developed.   HENT:      Head: Normocephalic and atraumatic.   Cardiovascular:      Rate and Rhythm: Normal rate.      Heart sounds: Normal heart sounds.   Pulmonary:      Effort: Pulmonary effort is normal.   Abdominal:      General: Bowel sounds are normal. There is no distension.      Palpations: Abdomen is soft.      Tenderness: There is no abdominal tenderness.   Musculoskeletal:         General: Normal range of motion.      Cervical back: Normal range of motion.   Skin:     General: Skin is warm and dry.      Capillary Refill: Capillary refill takes less than 2 seconds.             Comments: Ileostomy with liquid   Neurological:      Mental Status: He is alert and oriented to person, place, and time.   Psychiatric:         Behavior: Behavior normal.        I have reviewed all pertinent lab results within the past 24 hours.  CBC:   Recent Labs   Lab 01/29/25  0523   WBC 4.91   RBC 4.07*   HGB 11.3*   HCT 34.8*   *   MCV 86   MCH 27.8   MCHC 32.5     CMP:   Recent Labs   Lab 01/26/25  0400 01/27/25  1054 01/29/25  0523   GLU 90   < > 105   CALCIUM 8.4*   < > 8.7   ALBUMIN 2.4*  --   --    PROT 5.6*  --   --       < > 130*   K 4.2   < > 5.4*   CO2 23   < > 19*      < > 95   BUN 50*   < > 69*   CREATININE 2.4*   < > 3.3*   ALKPHOS 114  --   --    ALT 12  --   --    AST 12  --   --    BILITOT 0.8  --   --     < > = values in this interval not displayed.       Significant Diagnostics:  I have reviewed all pertinent imaging  results/findings within the past 24 hours.  CXR: I have reviewed all pertinent results/findings within the past 24 hours and my personal findings are:  Gastrografin challenge does not show an obstruction

## 2025-01-29 NOTE — CONSULTS
Consult Note  Nephrology    Consult Requested By: Poli Damon MD  Reason for Consult: GRADY    SUBJECTIVE:     History of Present Illness:  Patient is a 74 y.o. [facility] and new DVT despite being on Eliquis.  Extensive medical history as outlined below including CKD stage 4 with baseline creatinine between 2.5 and 3.0 and followed by [facility].  Has had recurrent AKIs in the past due to Crohn's, diuresis, etc..  Now with suspected small bowel obstruction versus ileus.  Diuretics were maintained due to advanced heart failure and BNP greater than 2500.  Creatinine has linh to 3.3 and consulted for evaluation.    Patient seen and examined.  Upset about overall status and asking to go home.  Discussed findings and awaiting surgery evaluation for suspected ileus.  Lasix has been held.  No signs or symptoms of volume overload except for chronic lower extremity edema likely from DVTs and hypoalbuminemia.  Discussed with team.    Epic reviewed.    Past Medical History:   Diagnosis Date    Anticoagulant long-term use     Basal cell carcinoma     BPH (benign prostatic hyperplasia)     s/p TURP    Carotid stenosis     Chronic kidney disease     Claudication     DDD (degenerative disc disease) 10/21/2013    Diabetes mellitus with renal complications     Disc disease, degenerative, cervical     DVT (deep venous thrombosis)     Encounter for blood transfusion     GERD (gastroesophageal reflux disease)     Gout, chronic     History of ulcerative colitis     s/p colectomy and ileostomy    HLD (hyperlipidemia)     HTN (hypertension)     Ileostomy in place 1982    Paroxysmal atrial fibrillation     RBBB     Squamous cell carcinoma 03/08/2018    Left superior helix near insertion    Squamous cell carcinoma 04/12/2018    Left forearm x 5    Syncope 07/06/2024    Ventricular tachycardia      Past Surgical History:   Procedure Laterality Date    ABLATION,  SVT, ACCESSORY PATHWAY N/A 4/30/2024    Procedure: Ablation, SVT, Accessory Pathway;  Surgeon: Franky Taylor MD;  Location: [facility] EP LAB;  Service: Cardiology;  Laterality: N/A;  VT, RFA, Carto, MAC, GP, 3 Prep *MDT ILR in situ*    cardiac stents      CATARACT EXTRACTION Bilateral     CERVICAL FUSION      CHOLECYSTECTOMY N/A 2/14/2023    Procedure: CHOLECYSTECTOMY;  Surgeon: Mike Joyce MD;  Location: [facility] OR;  Service: General;  Laterality: N/A;  very high probabilty of converison to open    colectomy and ileostomy  1985    ENDOSCOPIC ULTRASOUND OF UPPER GASTROINTESTINAL TRACT N/A 2/10/2023    Procedure: ULTRASOUND, UPPER GI TRACT, ENDOSCOPIC;  Surgeon: Poli Fuller MD;  Location: [facility] ENDO;  Service: Endoscopy;  Laterality: N/A;    ERCP N/A 2/10/2023    Procedure: ERCP (ENDOSCOPIC RETROGRADE CHOLANGIOPANCREATOGRAPHY);  Surgeon: Poli Fuller MD;  Location: [facility] ENDO;  Service: Endoscopy;  Laterality: N/A;    EXCISION OF PAROTID GLAND Left 12/18/2020    Procedure: EXCISION, PAROTID GLAND;  Surgeon: Michael Pinzon MD;  [address];  Service: ENT;  Laterality: Left;    INSERTION OF IMPLANTABLE LOOP RECORDER  06/07/2021    INSERTION OF IMPLANTABLE LOOP RECORDER Left 6/7/2021    Procedure: INSERTION, IMPLANTABLE LOOP RECORDER;  Surgeon: Romario Dao MD;  Location: [facility] CATH LAB;  Service: Cardiology;  Laterality: Left;    LEFT HEART CATHETERIZATION Right 4/15/2021    Procedure: CATHETERIZATION, HEART, LEFT;  Surgeon: Marcio Jones MD;  Location: [facility] CATH LAB;  Service: Cardiology;  Laterality: Right;    LYSIS OF ADHESIONS N/A 11/9/2020    Procedure: LYSIS, ADHESIONS,  ERAS low;  Surgeon: CED Haley MD;  Location: [facility] OR 2ND FLR;  Service: Colon and Rectal;  Laterality: N/A;    LYSIS OF ADHESIONS N/A 2/14/2023    Procedure: LYSIS, ADHESIONS;  Surgeon: Mike Joyce MD;  Location: [facility] OR;  Service: General;  Laterality: N/A;    POUCHOSCOPY N/A 4/6/2022    Procedure:  ENDOSCOPY, POUCH, SMALL INTESTINE, DIAGNOSTIC;  Surgeon: Dieter Juarez MD;  Location: [facility] ENDO (2ND FLR);  Service: Endoscopy;  Laterality: N/A;    REPAIR, HERNIA, PARASTOMAL N/A 11/9/2020    Procedure: REPAIR, HERNIA, PARASTOMAL;  Surgeon: CED Haley MD;  Location: [facility] OR 2ND FLR;  Service: Colon and Rectal;  Laterality: N/A;    TRANSURETHRAL RESECTION OF PROSTATE (TURP) WITHOUT USE OF LASER N/A 1/23/2019    Procedure: TURP, WITHOUT USING LASER BIPOLAR;  Surgeon: Catarino Mota MD;  Location: [facility] OR 1ST FLR;  Service: Urology;  Laterality: N/A;  1.5 HOURS    TREATMENT OF CARDIAC ARRHYTHMIA  4/30/2024    Procedure: Cardioversion or Defibrillation;  Surgeon: Franky Taylor MD;  Location: [facility] EP LAB;  Service: Cardiology;;     Family History   Problem Relation Name Age of Onset    Dementia Mother      Cancer Father      Diabetes Father      Heart disease Father      Melanoma Neg Hx      Hypertension Neg Hx      Arthritis Neg Hx       Social History     Tobacco Use    Smoking status: Never     Passive exposure: Never    Smokeless tobacco: Never   Substance Use Topics    Alcohol use: No    Drug use: No       Review of patient's allergies indicates:   Allergen Reactions    Atorvastatin     Rosuvastatin         Review of Systems:  Constitutional: No fever or chills  Respiratory: No cough or shortness of breath  Cardiovascular: No chest pain or palpitations  Gastrointestinal: No nausea or vomiting; +mild abdominal pain  Neurological: No confusion or weakness    OBJECTIVE:     Vital Signs (Most Recent)  Temp: 97.5 °F (36.4 °C) (01/29/25 0753)  Pulse: 67 (01/29/25 1016)  Resp: 18 (01/29/25 1016)  BP: (!) 95/58 (01/29/25 1016)  SpO2: 98 % (01/29/25 1016)    Vital Signs Range (Last 24H):  Temp:  [97.4 °F (36.3 °C)-97.5 °F (36.4 °C)]   Pulse:  [67-96]   Resp:  [14-20]   BP: ()/(58-79)   SpO2:  [95 %-100 %]       Intake/Output Summary (Last 24 hours) at 1/29/2025 1045  Last data filed at 1/29/2025  0800  Gross per 24 hour   Intake 840 ml   Output 700 ml   Net 140 ml       Physical Exam:  General appearance:  Frail and weak  Eyes:  Conjunctivae/corneas clear. PERRL.  Lungs: Normal respiratory effort,   clear to auscultation bilaterally   Heart: Regular rate and rhythm, S1, S2 normal, 3/6 murmur  Abdomen: Soft, nontender, ostomy with minimal output.  Hypoactive bowel sounds  Extremities: No cyanosis or clubbing.  1+ edema.    Skin: Skin color pale, texture, turgor normal. No rashes or lesions  Neurologic:  No focal numbness or weakness       Laboratory:  Recent Labs   Lab 01/29/25  0523   WBC 4.91   RBC 4.07*   HGB 11.3*   HCT 34.8*   *   MCV 86   MCH 27.8   MCHC 32.5     BMP:   Recent Labs   Lab 01/29/25  0523      *   K 5.4*   CL 95   CO2 19*   BUN 69*   CREATININE 3.3*   CALCIUM 8.7   MG 1.8   PHOS 5.2*     Lab Results   Component Value Date    CALCIUM 8.7 01/29/2025    PHOS 5.2 (H) 01/29/2025     BNP  Recent Labs   Lab 01/24/25  0427   BNP 2,586*     Lab Results   Component Value Date    URICACID 7.9 (H) 10/10/2024     Lab Results   Component Value Date    IRON 27 (L) 10/03/2024    TIBC 443 10/03/2024    FERRITIN 119 10/03/2024     Lab Results   Component Value Date    .9 (H) 10/03/2024    CALCIUM 8.7 01/29/2025    PHOS 5.2 (H) 01/29/2025       Diagnostic Results:  X-Ray Abdomen AP 1 View   Final Result      As above described.         Electronically signed by: Mimi Guadalupe   Date:    01/28/2025   Time:    17:21      MRI Abdomen Pelvis Without Contrast (XPD)   Final Result      1. No evidence for intra-abdominal solid mass, noting low sensitivity of noncontrast examination.   2. Nodular liver contour with trace perihepatic fluid suspicious for cirrhosis.   3. Pancreatic cystic lesion, similar to appearance on prior CT and suboptimally characterized on noncontrast assessment.  Recommend further evaluation with dedicated contrast enhanced MRI with MRCP.   4. Bilateral renal cysts.    5. Additional findings above.         Electronically signed by: Adin Ramirez MD   Date:    01/25/2025   Time:    08:54      NM Lung Scan Ventilation Perfusion   Final Result      Low intermediate probability (20-50%) of acute pulmonary embolism.         Electronically signed by: Daniel Velazquez MD   Date:    01/24/2025   Time:    12:49      X-Ray Abdomen AP 1 View    (Results Pending)   X-Ray Abdomen AP 1 View    (Results Pending)       ASSESSMENT/PLAN:     Oliguric acute kidney injury on CKD stage 4 with baseline creatinine 2.5-3.0 secondary to diuresis, hypotension, bowel obstruction and poor intake, etc.:  -extensive history and monitored by [facility]  -hold diuretics today and allow to equilibrate as no signs or symptoms of volume overload and currently on room air.  -if unable to tolerate liquids may need to trickle IV fluids but a very fine line with advanced heart failure  -no role for renal replacement therapy but will be a poor candidate due to overall comorbidities  -resume midodrine in hopes of improving cardiac output and blood pressure  -see orders  -MRI w/o hydro  -check bladder scan  -Renally dose meds, avoid nephrotoxins, and monitor I/O's closely.    Mild hyperkalemia/NAGMA:  -low dose bicarb/lokelma  -monitor.     SBO/Ileus with hx of Crohns:  -defer.    DVTs on eliquis:  -defer    Advanced valvular disease/Sys CHF/AF:  -per cards.  -hold diuretics today      Thanks for consult  See above  Will follow along.       High MDM complexity necessary to treat or prevent imminent or life-threatening deterioration of the following conditions: GRADY, ileus, CHF, DVT  Time spent personally by me on the following activities 90 min: development of treatment plan with patient or surrogate, discussions with consultants, interpretation of cardiac output measurements, examination of patient, ordering and performing treatments and interventions, evaluation of patient's response to treatment, obtaining  history from patient or surrogate, ordering and review of laboratory studies, ordering and review of radiographic studies, re-evaluation of patient's condition, pulse oximetry and blood draw for specimens    I evaluated the patient with the NP and was present during the history and performed the physical examination above.  I formulated all medical decision making.  Although the note was started by the NP, I have taken responsibility for the plan and have signed this note. I was present for more that 50% of total time: >90min    Bob Devine MD  Nephrology

## 2025-01-29 NOTE — HPI
"74 year old male with a past medical history of CAD status post PCI to mid LAD in 2017, Hx of VT, Atrial tachycardia s/p AT ablation in 4/2024 and DVT on Eliquis, CHF with the EF of 35%, SBO, s/p colostomy, hypertension, hyperlipidemia, and DM2 who presented to the ED at [facility] with reports of cough, congestion and bilateral chest pain.  His chest pain increased with coughing and moving his body as well as taking a deep breath over the last few days. Patient states that the chest started on left side of his chest and went to the right side. He states it feels like an aching. He states that his easy to breathe through his mouth, but he feels like he cannot breathe through his nose. He feels that the swelling in his legs has worsened.  While in the ED, it was discovered that he has a DVT to the left lower extremity and decision was made to transfer to [facility] for possible IVC filter placement.     On arrival to [facility], the patient had severe chest pain radiating into the center of the chest.  Reports worsened shortness of breath.  He will be admitted to [facility] for further management of his acute chest pain and Cardiology consult."  "

## 2025-01-29 NOTE — ASSESSMENT & PLAN NOTE
"-Cr on admit 2.6.  Today Cr up to 3.2 with slight hyperkalemia (5.2) and normal anion-gap metabolic acidosis  -Baseline Cr 2.2-2.7.  Follows with [facility].  Per last visit note with Dr. Aguirre 11/7/2024: "CKD clinically 2/2 age-related nephron loss,multiple Jelani episodes, chronic hypotension leading to hypoperfusion an recent worsening heart failure with EF 35 % and grade 2 DD, previous NSAID use for gout, volume depletion episodes c high ostomy output."   -Hold iv lasix and potassium for today.  -Avoid hypotension - hold parameter placed for toprol  -Continue Nabicarb tablets  -Repeat BMP in AM  "
"-Cr on admit 2.6.  Today Cr up to 3.3 with slight hyperkalemia (5.4) and normal anion-gap metabolic acidosis  -Baseline Cr 2.2-2.7.  Follows with [facility].  Per last visit note with Dr. Aguirre 11/7/2024: "CKD clinically 2/2 age-related nephron loss,multiple Jelani episodes, chronic hypotension leading to hypoperfusion an recent worsening heart failure with EF 35 % and grade 2 DD, previous NSAID use for gout, volume depletion episodes c high ostomy output."   -Hold iv lasix and potassium for now.  -Avoid hypotension - hold parameter placed for toprol.  Agree with starting midodrine.  -Continue Nabicarb tablets  -Repeat BMP in AM  "
-A1c 6.5 12/27/24  -Home regimen includes glimepiride - holding during hospitalization  -Blood sugars reasonably controlled today - continue SSI  
-A1c 6.5 12/27/24  -Home regimen includes glimepiride - holding during hospitalization  -Blood sugars reasonably controlled today - continue SSI  
-Due to advanced kidney disease patient at risk for nephrogenic systemic fibrosis/nephrogenic fibrosing dermopathy.    -MRI without contrast obtained showed no evidence for intra-abdominal solid mass.  Pancreatic cystic lesion noted.    -Recommend outpatient follow-up for ERCP/EUS.  
-Due to advanced kidney disease patient at risk for nephrogenic systemic fibrosis/nephrogenic fibrosing dermopathy.    -MRI without contrast obtained showed no evidence for intra-abdominal solid mass.  Pancreatic cystic lesion noted.    -Recommend outpatient follow-up for ERCP/EUS.  
-Evidence of flare involving primarily her insulin right hand.    -Treating with short course of prednisone and every other day colchicine.  Improving.    
-Evidence of flare involving primarily her insulin right hand.    -Treating with short course of prednisone and every other day colchicine.  Improving.    
-Fluid overloaded on admission  -Echo showed EF 30%, grade I diastolic dysfunction, severe LA dilation, moderate RA dilation, mod/severe TR, mild to moderate pulmonary hypertension (PASP 42mmHg)  -Cardiology consulted and input appreciated  -Continue toprol  -Diuresed with IV lasix and clinically has improved.  Still with some crackles and peripheral edema.  However, renal function a bit worse so have held iv lasix since 1/28.  Dr. Hernández favors ongoing diuresis and acceptance of worse renal function in order to maintain euvolemia.  Nephrology recommends holding diuretics for now, with which I agree.  
-Fluid overloaded on admission  -Echo showed EF 30%, grade I diastolic dysfunction, severe LA dilation, moderate RA dilation, mod/severe TR, mild to moderate pulmonary hypertension (PASP 42mmHg)  -Cardiology consulted and input appreciated  -Continue toprol  -Diuresed with IV lasix and clinically has improved.  Still with some crackles and peripheral edema.  However, renal function is worse so will hold off on iv lasix today.  Ultimately, may need to accept worse renal function in order to maintain euvolemia.  -Discussed with Dr. Hernández today.  
-History noted and it appears intact.  However patient believes he may have obstruction due to diminished output  -Check KUB today.  
-History noted and it appears intact.  However patient believes he may have obstruction due to diminished output - noted on 1/28  -KUB showed mildly dilated loop of bowel to the left of the lower lumbar spine. Possibility of a partial involving some obstruction or ileus cannot be entirely excluded.   -Will consult general surgery.  Discussed with Dr. Hagen and do gastrograffin challenge today.  OK to add stool softener for now.  
-Nutrition consulted. Most recent weight and BMI monitored-   -Measurements:  Wt Readings from Last 1 Encounters:   01/24/25 73 kg (160 lb 15 oz)   Body mass index is 20.66 kg/m².  -Patient has been screened and assessed by RD.  -Malnutrition Type:Context: chronic illness Level: moderate  -Malnutrition Characteristic Summary:  Weight Loss (Malnutrition): greater than 20% in 1 year  Energy Intake (Malnutrition): less than 75% for greater than or equal to 3 months  -Interventions/Recommendations (treatment strategy):  1) Add Boost Glucose Control to every meal  2) Continue current diet and encourage intake of meals  3) Honor pts food preferences  4) RD to monitor and follow up  
-Nutrition consulted. Most recent weight and BMI monitored-   -Measurements:  Wt Readings from Last 1 Encounters:   01/24/25 73 kg (160 lb 15 oz)   Body mass index is 20.66 kg/m².  -Patient has been screened and assessed by RD.  -Malnutrition Type:Context: chronic illness Level: moderate  -Malnutrition Characteristic Summary:  Weight Loss (Malnutrition): greater than 20% in 1 year  Energy Intake (Malnutrition): less than 75% for greater than or equal to 3 months  -Interventions/Recommendations (treatment strategy):  1) Add Boost Glucose Control to every meal  2) Continue current diet and encourage intake of meals  3) Honor pts food preferences  4) RD to monitor and follow up  
-On admit 136k and trended down to 83k.  Now appears to be improving and is 105  -Continue eliquis  -Repeat cbc in AM  
-On admit 136k and trended down to 83k.  Now appears to be improving and is 113  -Continue eliquis  -Repeat cbc in AM  
-Ultrasound showed nonocclusive DVT within the left greater saphenous vein.  Greater saphenous vein is a superficial vein therefore this is not really a DVT.    -V/Q scan low probability for pulmonary embolism.    -Overall did not feel that patient really failed treatment with the apixaban.  Light of increased risk of bleeding complication rivaroxaban compared to apixaban will continue with long-term anticoagulation with apixaban.  Discontinue heparin infusion.    -Started apixaban 1/26/2025.  Continue apixaban.  
-Ultrasound showrf nonocclusive DVT within the left greater saphenous vein.  Great saphenous vein is a superficial vein therefore this is not really a DVT.    -V/Q scan low probability for pulmonary embolism.    -Overall did not feel that patient really failed treatment with the apixaban.  Light of increased risk of bleeding complication rivaroxaban compared to apixaban will continue with long-term anticoagulation with apixaban.  Discontinue heparin infusion.    -Started apixaban 1/26/2025.  Continue apixaban.  
-Well controlled without hypotension (a problem for him in the past)  -Continue toprol with hold parameters to avoid hypotension  
-Well controlled without hypotension (a problem for him in the past)  -Continue toprol with hold parameters to avoid hypotension  
BG- 136    Low dose SSI  BG AC/HS  Diabetic diet    
Continue pravastatin    
Creatine stable for now. BMP reviewed- noted Estimated Creatinine Clearance: 25.5 mL/min (A) (based on SCr of 2.6 mg/dL (H)). according to latest data. Based on current GFR, CKD stage is stage 4 - GFR 15-29.  Monitor UOP and serial BMP and adjust therapy as needed. Renally dose meds. Avoid nephrotoxic medications and procedures.  
Due to advanced kidney disease patient at risk for nephrogenic systemic fibrosis/nephrogenic fibrosing dermopathy.  MRI without contrast obtained showed no evidence for intra-abdominal solid mass.  Pancreatic cystic lesion noted.  Recommend outpatient follow-up for ERCP/EUS.  
Evidence of flare involving primarily her insulin right hand.  Treating with short course of prednisone and every other day colchicine.  Improving.    
Evidence of flare involving primarily her insulin right hand.  We will give short course of prednisone.  Restart colchicine.    
Improving with intravenous diuretic therapy.  We will continue to achieve euvolemia.  
Malnutrition Type:  Context: chronic illness  Level: moderate    Related to (etiology):   Inadequate energy intake    Signs and Symptoms (as evidenced by):   Weight loss >20% in 1 year; reported poor appetite     Malnutrition Characteristic Summary:  Weight Loss (Malnutrition): greater than 20% in 1 year  Energy Intake (Malnutrition): less than 75% for greater than or equal to 3 months      Interventions/Recommendations (treatment strategy):  1) Add Boost Glucose Control to every meal  2) Continue current diet and encourage intake of meals  3) Honor pts food preferences  4) RD to monitor and follow up    Nutrition Diagnosis Status:   New  
No sign of obstruction on imaging and ileostomy started putting out more.    Patient does not have any distention.    No need for surgical intervention.      
Nutrition consulted. Most recent weight and BMI monitored-     Measurements:  Wt Readings from Last 1 Encounters:   01/24/25 73 kg (160 lb 15 oz)   Body mass index is 20.66 kg/m².    Patient has been screened and assessed by RD.    Malnutrition Type:  Context: chronic illness  Level: moderate    Malnutrition Characteristic Summary:  Weight Loss (Malnutrition): greater than 20% in 1 year  Energy Intake (Malnutrition): less than 75% for greater than or equal to 3 months    Interventions/Recommendations (treatment strategy):  1) Add Boost Glucose Control to every meal  2) Continue current diet and encourage intake of meals  3) Honor pts food preferences  4) RD to monitor and follow up  
Patient with severe 10/10 chest pain on arrival.  States he has been having chest pain since presentation like this.  SOB, however O2 sats WNL.  PE?    Stat troponin, EKG pending, morphine, oxygen  Consult Cardiology  Echo      
Patient's blood pressure range in the last 24 hours was: BP  Min: 95/58  Max: 114/70.The patient's inpatient anti-hypertensive regimen is listed below:  Current Antihypertensives  furosemide tablet 20 mg, Daily, Oral  hydrALAZINE tablet 10 mg, Every 8 hours, Oral  metoprolol succinate (TOPROL-XL) 24 hr tablet 25 mg, Daily, Oral    Plan  - BP is controlled, no changes needed to their regimen    
Reasonably controlled with current regimen.  Will continue with current regimen and continue to monitor.  
Reasonably controlled with current regimen.  Will continue with current regimen and continue to monitor.  
US- Nonocclusive DVT within the left greater saphenous vein.  No evidence of DVT within the right lower extremity.    Continue apixaban  Consider IVC filter, Cardiology consult    
Ultrasound shows nonocclusive DVT within the left greater saphenous vein.  Great saphenous vein technically a superficial vein therefore not really a deep vein thrombosis.  V/Q scan low probability for pulmonary embolism.  Overall did not feel that patient really failed treatment with the apixaban.  Light of increased risk of bleeding complication rivaroxaban compared to apixaban will continue with long-term anticoagulation with apixaban.  Discontinue heparin infusion.  Start apixaban today.  
Ultrasound shows nonocclusive DVT within the left greater saphenous vein.  Great saphenous vein technically a superficial vein therefore not really a deep vein thrombosis.  V/Q scan low probability for pulmonary embolism.  Overall did not feel that patient really failed treatment with the apixaban.  Light of increased risk of bleeding complication rivaroxaban compared to apixaban will continue with long-term anticoagulation with apixaban.  Discontinue heparin infusion.  Started apixaban 1/26/2025.  Continue apixaban.  
Ultrasound shows nonocclusive DVT within the left greater saphenous vein.  Patient with recurrence deep vein thrombosis despite taking apixaban.  Patient currently on a heparin infusion.  V/Q low probability for pulmonary embolus.       Evaluated by Hematology.  Recommendation to consider switching from apixaban to rivaroxaban on discharge. Overall would prefer to use apixaban over rivaroxaban in the setting of renal failure.  However in light of treatment failure considering utilizing different agent might be reasonable despite increased risk of bleeding complications.     labeling for rivaroxaban recommends no dosage adjustment in patients with CrCl >=15 mL/minute.  Creatinine clearance 26 mL/min by Cockcroft-Gault equation.  Standard recommended dose for rivaroxaban for acute deep venous thrombosis is 15 mg twice daily for 21 days followed by 20 mg daily. Continue with heparin for now.      Will confer with cardiology and hematology.  
abnormal/expand...

## 2025-01-30 VITALS
RESPIRATION RATE: 18 BRPM | BODY MASS INDEX: 20.65 KG/M2 | OXYGEN SATURATION: 99 % | TEMPERATURE: 98 F | DIASTOLIC BLOOD PRESSURE: 75 MMHG | HEIGHT: 74 IN | HEART RATE: 95 BPM | WEIGHT: 160.94 LBS | SYSTOLIC BLOOD PRESSURE: 118 MMHG

## 2025-01-30 DIAGNOSIS — Z00.00 ENCOUNTER FOR MEDICARE ANNUAL WELLNESS EXAM: ICD-10-CM

## 2025-01-30 LAB
ANION GAP SERPL CALC-SCNC: 13 MMOL/L (ref 8–16)
BASOPHILS # BLD AUTO: 0 K/UL (ref 0–0.2)
BASOPHILS NFR BLD: 0 % (ref 0–1.9)
BUN SERPL-MCNC: 70 MG/DL (ref 8–23)
CALCIUM SERPL-MCNC: 8.6 MG/DL (ref 8.7–10.5)
CHLORIDE SERPL-SCNC: 98 MMOL/L (ref 95–110)
CO2 SERPL-SCNC: 22 MMOL/L (ref 23–29)
CREAT SERPL-MCNC: 3.3 MG/DL (ref 0.5–1.4)
DIFFERENTIAL METHOD BLD: ABNORMAL
EOSINOPHIL # BLD AUTO: 0 K/UL (ref 0–0.5)
EOSINOPHIL NFR BLD: 0 % (ref 0–8)
ERYTHROCYTE [DISTWIDTH] IN BLOOD BY AUTOMATED COUNT: 19.2 % (ref 11.5–14.5)
EST. GFR  (NO RACE VARIABLE): 19 ML/MIN/1.73 M^2
GLUCOSE SERPL-MCNC: 71 MG/DL (ref 70–110)
HCT VFR BLD AUTO: 32.9 % (ref 40–54)
HGB BLD-MCNC: 10.5 G/DL (ref 14–18)
IMM GRANULOCYTES # BLD AUTO: 0.02 K/UL (ref 0–0.04)
IMM GRANULOCYTES NFR BLD AUTO: 0.5 % (ref 0–0.5)
LYMPHOCYTES # BLD AUTO: 0.6 K/UL (ref 1–4.8)
LYMPHOCYTES NFR BLD: 12.5 % (ref 18–48)
MAGNESIUM SERPL-MCNC: 1.9 MG/DL (ref 1.6–2.6)
MCH RBC QN AUTO: 27.3 PG (ref 27–31)
MCHC RBC AUTO-ENTMCNC: 31.9 G/DL (ref 32–36)
MCV RBC AUTO: 86 FL (ref 82–98)
MONOCYTES # BLD AUTO: 0.3 K/UL (ref 0.3–1)
MONOCYTES NFR BLD: 7.3 % (ref 4–15)
NEUTROPHILS # BLD AUTO: 3.5 K/UL (ref 1.8–7.7)
NEUTROPHILS NFR BLD: 79.7 % (ref 38–73)
NRBC BLD-RTO: 0 /100 WBC
PHOSPHATE SERPL-MCNC: 4.7 MG/DL (ref 2.7–4.5)
PLATELET # BLD AUTO: 110 K/UL (ref 150–450)
PMV BLD AUTO: ABNORMAL FL (ref 9.2–12.9)
POCT GLUCOSE: 134 MG/DL (ref 70–110)
POCT GLUCOSE: 74 MG/DL (ref 70–110)
POTASSIUM SERPL-SCNC: 4.1 MMOL/L (ref 3.5–5.1)
RBC # BLD AUTO: 3.84 M/UL (ref 4.6–6.2)
SODIUM SERPL-SCNC: 133 MMOL/L (ref 136–145)
WBC # BLD AUTO: 4.4 K/UL (ref 3.9–12.7)

## 2025-01-30 PROCEDURE — 83735 ASSAY OF MAGNESIUM: CPT | Mod: HCNC | Performed by: HOSPITALIST

## 2025-01-30 PROCEDURE — 85025 COMPLETE CBC W/AUTO DIFF WBC: CPT | Mod: HCNC | Performed by: HOSPITALIST

## 2025-01-30 PROCEDURE — 25000003 PHARM REV CODE 250: Mod: HCNC | Performed by: HOSPITALIST

## 2025-01-30 PROCEDURE — 36415 COLL VENOUS BLD VENIPUNCTURE: CPT | Mod: HCNC | Performed by: HOSPITALIST

## 2025-01-30 PROCEDURE — 84100 ASSAY OF PHOSPHORUS: CPT | Mod: HCNC | Performed by: HOSPITALIST

## 2025-01-30 PROCEDURE — 80048 BASIC METABOLIC PNL TOTAL CA: CPT | Mod: HCNC | Performed by: HOSPITALIST

## 2025-01-30 PROCEDURE — 25000003 PHARM REV CODE 250: Mod: HCNC | Performed by: NURSE PRACTITIONER

## 2025-01-30 PROCEDURE — 63600175 PHARM REV CODE 636 W HCPCS: Mod: HCNC | Performed by: HOSPITALIST

## 2025-01-30 RX ORDER — FUROSEMIDE 40 MG/1
TABLET ORAL
Qty: 30 TABLET | Refills: 1 | Status: ON HOLD | OUTPATIENT
Start: 2025-01-30 | End: 2025-02-12 | Stop reason: HOSPADM

## 2025-01-30 RX ORDER — MIDODRINE HYDROCHLORIDE 2.5 MG/1
2.5 TABLET ORAL 2 TIMES DAILY WITH MEALS
Qty: 60 TABLET | Refills: 1 | Status: ON HOLD | OUTPATIENT
Start: 2025-01-30 | End: 2025-03-04 | Stop reason: HOSPADM

## 2025-01-30 RX ORDER — SODIUM BICARBONATE 650 MG/1
650 TABLET ORAL 2 TIMES DAILY
Status: DISCONTINUED | OUTPATIENT
Start: 2025-01-30 | End: 2025-01-30 | Stop reason: HOSPADM

## 2025-01-30 RX ORDER — DOCUSATE SODIUM 100 MG/1
100 CAPSULE, LIQUID FILLED ORAL 2 TIMES DAILY PRN
Qty: 30 CAPSULE | Refills: 0 | Status: SHIPPED | OUTPATIENT
Start: 2025-01-30 | End: 2025-02-03

## 2025-01-30 RX ADMIN — METOPROLOL SUCCINATE 25 MG: 25 TABLET, EXTENDED RELEASE ORAL at 10:01

## 2025-01-30 RX ADMIN — BRIMONIDINE TARTRATE 1 DROP: 2 SOLUTION/ DROPS OPHTHALMIC at 09:01

## 2025-01-30 RX ADMIN — DOCUSATE SODIUM 100 MG: 100 CAPSULE, LIQUID FILLED ORAL at 10:01

## 2025-01-30 RX ADMIN — APIXABAN 5 MG: 2.5 TABLET, FILM COATED ORAL at 10:01

## 2025-01-30 RX ADMIN — SODIUM BICARBONATE 1300 MG: 650 TABLET ORAL at 10:01

## 2025-01-30 RX ADMIN — PREDNISONE 10 MG: 10 TABLET ORAL at 10:01

## 2025-01-30 RX ADMIN — AMIODARONE HYDROCHLORIDE 200 MG: 200 TABLET ORAL at 10:01

## 2025-01-30 RX ADMIN — ALLOPURINOL 300 MG: 300 TABLET ORAL at 10:01

## 2025-01-30 RX ADMIN — PRAVASTATIN SODIUM 40 MG: 20 TABLET ORAL at 10:01

## 2025-01-30 RX ADMIN — TAMSULOSIN HYDROCHLORIDE 0.4 MG: 0.4 CAPSULE ORAL at 10:01

## 2025-01-30 RX ADMIN — FERROUS SULFATE TAB 325 MG (65 MG ELEMENTAL FE) 1 EACH: 325 (65 FE) TAB at 10:01

## 2025-01-30 NOTE — NURSING
Pt being discharged; daughter is in route from [place]. AVS reviewed by charge nurse. Tele box and IV line also removed by charge nurse.

## 2025-01-30 NOTE — PLAN OF CARE
Case Management Final Discharge Note      Discharge Disposition: Home    New DME ordered / company name: None    Relevant SDOH / Transition of Care Barriers:  None    Person available to provide assistance at home when needed and their contact information: Daughter    Scheduled followup appointment: Cardiology, Urology, PCP, and Gastroenterology on AVS    Referrals placed: Gastroenterology    Transportation: Family will provide transportation home    Patient educated on discharge services and updated on DC plan. Bedside RN notified, patient clear to discharge from Case Management Perspective.     [facility] ([address])  Discharge Final Note    Primary Care Provider: Tripp Mohan MD    Expected Discharge Date: 1/30/2025    Final Discharge Note (most recent)       Final Note - 01/30/25 1118          Final Note    Assessment Type Final Discharge Note (P)      Anticipated Discharge Disposition Home or Self Care (P)      Hospital Resources/Appts/Education Provided Provided patient/caregiver with written discharge plan information;Appointments scheduled and added to AVS (P)         Post-Acute Status    Discharge Delays None known at this time (P)                      Important Message from Medicare  Important Message from Medicare regarding Discharge Appeal Rights: Explained to patient/caregiver, Signed/date by patient/caregiver     Date IMM was signed: 01/29/25  Time IMM was signed: 1031    Contact Info       Almaz Hernández MD   Specialty: Cardiology, Interventional Cardiology    [address]  [address]  [facility] [address]   Phone: [phone]       Next Steps: Follow up    Samra Aguirre MD   Specialty: Nephrology, Internal Medicine    [address] [facility] [address]   Phone: [phone]       Next Steps: Follow up

## 2025-01-30 NOTE — NURSING
11:13 AM  In agreement and eager for DC.  VU of DC instructions, paperwork and prescriptions to be PU p DC at preferred Pharmacy.  IV removed with cath tip intact, WNL.    To be DC home with daughter, on the way  Will be escorted downstairs via  transport team once dressed and daughter has arrived

## 2025-01-30 NOTE — PLAN OF CARE
Problem: Adult Inpatient Plan of Care  Goal: Plan of Care Review  Outcome: Progressing  Goal: Patient-Specific Goal (Individualized)  Outcome: Progressing  Goal: Absence of Hospital-Acquired Illness or Injury  Outcome: Progressing  Goal: Optimal Comfort and Wellbeing  Outcome: Progressing  Goal: Readiness for Transition of Care  Outcome: Progressing     Problem: Diabetes Comorbidity  Goal: Blood Glucose Level Within Targeted Range  Outcome: Progressing     Problem: Wound  Goal: Optimal Coping  Outcome: Progressing  Goal: Improved Oral Intake  Outcome: Progressing  Goal: Optimal Pain Control and Function  Outcome: Progressing  Goal: Skin Health and Integrity  Outcome: Progressing  Goal: Optimal Wound Healing  Outcome: Progressing     Problem: Fall Injury Risk  Goal: Absence of Fall and Fall-Related Injury  Outcome: Progressing     Problem: VTE (Venous Thromboembolism)  Goal: Tissue Perfusion  Outcome: Progressing     Problem: Acute Kidney Injury/Impairment  Goal: Fluid and Electrolyte Balance  Outcome: Progressing  Goal: Improved Oral Intake  Outcome: Progressing  Goal: Effective Renal Function  Outcome: Progressing

## 2025-01-30 NOTE — PROGRESS NOTES
"Nephrology  Progress Note    Admit Date: 1/24/2025   LOS: 6 days     SUBJECTIVE:     Follow-up For:  Acute deep vein thrombosis (DVT) of left lower extremity    History of Present Illness:  Patient is a 74 y.o. male presents with transfer from [facility] hospital with lower extremity edema and new DVT despite being on Eliquis.  Extensive medical history as outlined below including CKD stage 4 with baseline creatinine between 2.5 and 3.0 and followed by [facility].  Has had recurrent AKIs in the past due to Crohn's, diuresis, etc..  Now with suspected small bowel obstruction versus ileus.  Diuretics were maintained due to advanced heart failure and BNP greater than 2500.  Creatinine has linh to 3.3 and consulted for evaluation.     Patient seen and examined.  Upset about overall status and asking to go home.  Discussed findings and awaiting surgery evaluation for suspected ileus.  Lasix has been held.  No signs or symptoms of volume overload except for chronic lower extremity edema likely from DVTs and hypoalbuminemia.  Discussed with team.       Interval History:     looks/feels better.  UOP markedly improved and BP stable on midodrine.  Labs stabilized and pt eager to go home.  Discussed with team.     Review of Systems:  Constitutional: No fever or chills  Respiratory: No cough or shortness of breath  Cardiovascular: No chest pain or palpitations  Gastrointestinal: No nausea or vomiting  Neurological: No confusion or weakness    OBJECTIVE:     Vital Signs Range (Last 24H):  /75 (BP Location: Right arm, Patient Position: Lying)   Pulse 84   Temp 97.6 °F (36.4 °C) (Oral)   Resp 18   Ht 6' 2" (1.88 m)   Wt 73 kg (160 lb 15 oz)   SpO2 97%   BMI 20.66 kg/m²     Temp:  [97.3 °F (36.3 °C)-97.6 °F (36.4 °C)]   Pulse:  [67-85]   Resp:  [12-18]   BP: ()/(55-75)   SpO2:  [97 %-100 %]     I & O (Last 24H):  Intake/Output Summary (Last 24 hours) at 1/30/2025 1005  Last data filed at 1/30/2025 " 0525  Gross per 24 hour   Intake 660 ml   Output 1400 ml   Net -740 ml       Physical Exam:  General appearance:  Frail and weak  Eyes:  Conjunctivae/corneas clear. PERRL.  Lungs: Normal respiratory effort,   clear to auscultation bilaterally   Heart: Regular rate and rhythm, S1, S2 normal, 3/6 murmur  Abdomen: Soft, nontender, ostomy + bowel sounds  Extremities: No cyanosis or clubbing.  1+ edema.    Skin: Skin color pale, texture, turgor normal. No rashes or lesions  Neurological:  nonfocal    Laboratory Data:  Recent Labs   Lab 01/30/25  0403   WBC 4.40   RBC 3.84*   HGB 10.5*   HCT 32.9*   *   MCV 86   MCH 27.3   MCHC 31.9*       BMP:   Recent Labs   Lab 01/30/25  0403   GLU 71   *   K 4.1   CL 98   CO2 22*   BUN 70*   CREATININE 3.3*   CALCIUM 8.6*   MG 1.9   PHOS 4.7*     Lab Results   Component Value Date    CALCIUM 8.6 (L) 01/30/2025    PHOS 4.7 (H) 01/30/2025       Lab Results   Component Value Date    .9 (H) 10/03/2024    CALCIUM 8.6 (L) 01/30/2025    PHOS 4.7 (H) 01/30/2025       Lab Results   Component Value Date    URICACID 7.9 (H) 10/10/2024       BNP  Recent Labs   Lab 01/24/25  0427   BNP 2,586*       Medications:  Medication list was reviewed and changes noted under Assessment/Plan.    Diagnostic Results:        ASSESSMENT/PLAN:     Stabilized Oliguric and now non-oliguric acute kidney injury on CKD stage 4 with baseline creatinine 2.5-3.0 secondary to diuresis, hypotension, bowel obstruction and poor intake, etc.:  -extensive history and monitored by [facility]  -held diuretics and allowed to equilibrate as no signs or symptoms of volume overload and currently on room air.  -resumed midodrine in hopes of improving cardiac output and blood pressure and doing much better  -see orders  -MRI w/o hydro  --Renally dose meds, avoid nephrotoxins, and monitor I/O's closely.     Mild hyperkalemia/NAGMA:  -low dose bicarb  -monitor.      SBO/Ileus with hx of Crohns:  -defer.      DVTs on eliquis:  -defer     Advanced valvular disease/Sys CHF/AF:  -per cards.  -can resume diuretics QOD as great UOP w/o so far.         Thanks for consult  See above      Ok to DC today  F/up [facility] 1-2 weeks.          High MDM complexity necessary to treat or prevent imminent or life-threatening deterioration of the following conditions: GRADY  Time spent personally by me on the following activities 45 min: development of treatment plan with patient or surrogate, discussions with consultants, interpretation of cardiac output measurements, examination of patient, ordering and performing treatments and interventions, evaluation of patient's response to treatment, obtaining history from patient or surrogate, ordering and review of laboratory studies, ordering and review of radiographic studies, re-evaluation of patient's condition, pulse oximetry and blood draw for specimens

## 2025-01-30 NOTE — PLAN OF CARE
Problem: Adult Inpatient Plan of Care  Goal: Plan of Care Review  Outcome: Progressing  Goal: Patient-Specific Goal (Individualized)  Outcome: Progressing  Goal: Absence of Hospital-Acquired Illness or Injury  Outcome: Progressing  Goal: Optimal Comfort and Wellbeing  Outcome: Progressing  Goal: Readiness for Transition of Care  Outcome: Progressing     Problem: Diabetes Comorbidity  Goal: Blood Glucose Level Within Targeted Range  Outcome: Progressing     Problem: Wound  Goal: Optimal Coping  Outcome: Progressing  Goal: Optimal Functional Ability  Outcome: Progressing  Goal: Absence of Infection Signs and Symptoms  Outcome: Progressing  Goal: Improved Oral Intake  Outcome: Progressing  Goal: Optimal Pain Control and Function  Outcome: Progressing  Goal: Skin Health and Integrity  Outcome: Progressing  Goal: Optimal Wound Healing  Outcome: Progressing     Problem: Fall Injury Risk  Goal: Absence of Fall and Fall-Related Injury  Outcome: Progressing     Problem: VTE (Venous Thromboembolism)  Goal: Tissue Perfusion  Outcome: Progressing     Problem: Acute Kidney Injury/Impairment  Goal: Fluid and Electrolyte Balance  Outcome: Progressing  Goal: Improved Oral Intake  Outcome: Progressing  Goal: Effective Renal Function  Outcome: Progressing

## 2025-01-30 NOTE — DISCHARGE INSTRUCTIONS
Take all medications as prescribed.  Eat a strict low salt cardiac and renal diet.  Follow up with your physicians as scheduled - pcp within 1 week, nephrologist within 2 weeks and cardiologist within 2 weeks.  Thank you for trusting [facility] and Dr. Damon with your care.  We are honored that you entrusted us with your healthcare needs. Your satisfaction is very important to us and we hope you have been very pleased with your experience at [facility]. After your discharge you may receive a survey asking you to rate your hospital experience. We encourage you to take the time to complete the survey as your feedback allows us to identify areas for improvement as well as recognize our staff.   We hope that you have received the very best care possible during your hospitalization at [facility], as your satisfaction is our top priority.

## 2025-01-30 NOTE — NURSING
Pt resting in bed fully dressed waiting for his daughter. States he has called her x2 but no answer;states she was running some errands 1st. Charge nurse notified.

## 2025-01-30 NOTE — DISCHARGE SUMMARY
"[address] Medicine  Discharge Summary      Patient Name: Jarrett Charlton Jr.  MRN: [id number]  CORAZON: [id number]  Patient Class: IP- Inpatient  Admission Date: 1/24/2025  Hospital Length of Stay: 6 days  Discharge Date and Time: No discharge date for patient encounter.  Attending Physician: Poli Damon MD   Discharging Provider: Poli Damon MD  Primary Care Provider: Tripp Mohan MD    Primary Care Team: Networked reference to record PCT     HPI:   Per Abhi Canela, NP:    "The patient is a 74 year old male with a past medical history of CAD status post PCI to mid LAD in 2017, Hx of VT, Atrial tachycardia s/p AT ablation in 4/2024 and DVT on Eliquis, CHF with the EF of 35%, SBO, s/p colostomy, hypertension, hyperlipidemia, and DM2 who presented to the ED at [facility] with reports of cough, congestion and bilateral chest pain.  His chest pain increased with coughing and moving his body as well as taking a deep breath over the last few days. Patient states that the chest started on left side of his chest and went to the right side. He states it feels like an aching. He states that his easy to breathe through his mouth, but he feels like he cannot breathe through his nose. He feels that the swelling in his legs has worsened.  While in the ED, it was discovered that he has a DVT to the left lower extremity and decision was made to transfer to [facility] for possible IVC filter placement.    On arrival to [facility], the patient had severe chest pain radiating into the center of the chest.  Reports worsened shortness of breath.  He will be admitted to [facility] for further management of his acute chest pain and Cardiology consult."      Goals of Care Treatment Preferences:  Code Status: Full Code      SDOH Screening:  The patient was screened for utility difficulties, food insecurity, transport difficulties, housing insecurity, and interpersonal safety and there were " no concerns identified this admission.     Consults:   Consults (From admission, onward)          Status Ordering Provider     Inpatient consult to Nephrology-[place]  Once        Provider:  Brijesh Cast NP    Completed RIYA CHAU.     Inpatient consult to General Surgery  Once        Provider:  Marbin Lake MD    Completed RIYA CHAU.     Inpatient consult to Hematology/Oncology  Once        Provider:  Riya Heard MD    Completed KRUPA DENG F.     Inpatient consult to Cardiology  Once        Provider:  Almaz Hernández MD    Completed KRUPA DENG.          Hospital Course By Problem:   * Acute deep vein thrombosis (DVT) of left lower extremity  -Ultrasound showed nonocclusive DVT within the left greater saphenous vein.  Greater saphenous vein is a superficial vein therefore this is not really a DVT.    -V/Q scan low probability for pulmonary embolism.    -Overall did not feel that patient really failed treatment with the apixaban.  Light of increased risk of bleeding complication rivaroxaban compared to apixaban will continue with long-term anticoagulation with apixaban.  Discontinue heparin infusion.    -Started apixaban 1/26/2025.  Continue apixaban.  -Patient seen and examined today and doing better and eager to go home.  Renal function has stabilized, bowel function has normalized.  Recommend follow up with pcp within 1 week and cardiology and nephrology follow-up within 2 week.    Acute on chronic combined systolic and diastolic heart failure  -Fluid overloaded on admission  -Echo showed EF 30%, grade I diastolic dysfunction, severe LA dilation, moderate RA dilation, mod/severe TR, mild to moderate pulmonary hypertension (PASP 42mmHg)  -Cardiology consulted and input appreciated  -Continue toprol  -Diuresed with IV lasix and clinically has improved.  Held iv lasix once renal function worsened.  Patient is breathing comfortably on room air now and has only modest peripheral  "edema.  Discussed with nephrology and will continue low dose midodrine and plan to resume oral lasix on Monday (4 days after discharge) with every other day dosing.  Needs follow up with cardiology and nephrology within 2 weeks.    Acute renal failure superimposed on stage 4 chronic kidney disease  -Cr on admit 2.6.  Today Cr up to 3.3 with slight hyperkalemia (5.4) and normal anion-gap metabolic acidosis  -Baseline Cr 2.2-2.7.  Follows with [facility].  Per last visit note with Dr. Aguirre 11/7/2024: "CKD clinically 2/2 age-related nephron loss,multiple Jelani episodes, chronic hypotension leading to hypoperfusion an recent worsening heart failure with EF 35 % and grade 2 DD, previous NSAID use for gout, volume depletion episodes c high ostomy output."   -Held iv lasix and potassium and consulted nephrology.  Have resumed midodrine and patient is having good urine output and  creatinine has stabilized at 3.3 with normal K.  Will continue midodrine and plan to resume lasix 4 days after discharge with qod dosing.  Needs close follow-up with his nephrologist at [facility]    Thrombocytopenia  -On admit 136k and trended down to 83k.  Now appears to be improving and is 110  -Continue eliquis    Pseudogout  -Evidence of flare involving primarily her insulin right hand.    -Treating with short course of prednisone and every other day colchicine.  Improving.      Pancreatic lesion  -Due to advanced kidney disease patient at risk for nephrogenic systemic fibrosis/nephrogenic fibrosing dermopathy.    -MRI without contrast obtained showed no evidence for intra-abdominal solid mass.  Pancreatic cystic lesion noted.    -Recommend outpatient follow-up for ERCP/EUS - referral placed at discharge.    Moderate malnutrition  -Nutrition consulted. Most recent weight and BMI monitored-   -Measurements:  Wt Readings from Last 1 Encounters:   01/24/25 73 kg (160 lb 15 oz)   Body mass index is 20.66 kg/m².  -Patient has been screened and " assessed by RD.  -Malnutrition Type:Context: chronic illness Level: moderate  -Malnutrition Characteristic Summary:  Weight Loss (Malnutrition): greater than 20% in 1 year  Energy Intake (Malnutrition): less than 75% for greater than or equal to 3 months  -Interventions/Recommendations (treatment strategy):  1) Add Boost Glucose Control to every meal  2) Continue current diet and encourage intake of meals  3) Honor pts food preferences  4) RD to monitor and follow up    Type 2 diabetes mellitus  -A1c 6.5 12/27/24  -Home regimen includes glimepiride - held during hospitalization  -Blood sugars reasonably controlled today - continue SSI  -Resume home regimen at discharge    High output ileostomy  -History noted and it appears intact.  However patient believes he may have obstruction due to diminished output - noted on 1/28  -KUB showed mildly dilated loop of bowel to the left of the lower lumbar spine. Possibility of a partial involving some obstruction or ileus cannot be entirely excluded.   -Consulted general surgery and gastrograffin challenge obtained.  No evidence of obstruction and ostomy output has normalized.  OK to continue prn stool softener for now.    Essential hypertension  -Well controlled without hypotension (a problem for him in the past)  -Continue toprol with hold parameters to avoid hypotension  -Continue low dose midodrine.  Not resuming hydralazine at this time.      Final Active Diagnoses:    Diagnosis Date Noted POA    PRINCIPAL PROBLEM:  Acute deep vein thrombosis (DVT) of left lower extremity [I82.402] 01/24/2025 Yes    Acute on chronic combined systolic and diastolic heart failure [I50.43] 09/11/2024 Yes    Acute renal failure superimposed on stage 4 chronic kidney disease [N17.9, N18.4] 02/11/2021 Yes    Thrombocytopenia [D69.6] 01/28/2025 Yes    Pseudogout [M11.20] 11/19/2024 Yes    Pancreatic lesion [K86.9] 01/25/2025 Yes    Moderate malnutrition [E44.0] 10/03/2024 Yes    Type 2 diabetes  mellitus [E11.9] 09/30/2020 Yes    High output ileostomy [R19.8, Z93.2] 02/04/2019 Not Applicable    Essential hypertension [I10]  Yes      Problems Resolved During this Admission:    Diagnosis Date Noted Date Resolved POA    HLD (hyperlipidemia) [E78.5]  01/25/2025 Yes       Discharged Condition: stable    Disposition: Home or Self Care    Follow Up:   Follow-up Information       Almaz Hernández MD .    Specialties: Cardiology, Interventional Cardiology  Contact information:  [address]  [address]  [facility] [address]  [phone]               Samra Aguirre MD Follow up.    Specialties: Nephrology, Internal Medicine  Contact information:  [address]  [facility] [id number] [phone]                           Patient Instructions:      Diet renal     Diet diabetic     Diet Cardiac     Notify your health care provider if you experience any of the following:  increased confusion or weakness     Notify your health care provider if you experience any of the following:  persistent dizziness, light-headedness, or visual disturbances     Notify your health care provider if you experience any of the following:  worsening rash     Notify your health care provider if you experience any of the following:  severe persistent headache     Notify your health care provider if you experience any of the following:  difficulty breathing or increased cough     Notify your health care provider if you experience any of the following:  severe uncontrolled pain     Notify your health care provider if you experience any of the following:  persistent nausea and vomiting or diarrhea     Notify your health care provider if you experience any of the following:  temperature >100.4     Activity as tolerated       Significant Diagnostic Studies: Labs: BMP:   Recent Labs   Lab 01/29/25  0523 01/30/25  0403    71   * 133*   K 5.4* 4.1   CL 95 98   CO2 19* 22*   BUN 69* 70*   CREATININE 3.3* 3.3*   CALCIUM 8.7  8.6*   MG 1.8 1.9   , CMP   Recent Labs   Lab 01/29/25  0523 01/30/25  0403   * 133*   K 5.4* 4.1   CL 95 98   CO2 19* 22*    71   BUN 69* 70*   CREATININE 3.3* 3.3*   CALCIUM 8.7 8.6*   ANIONGAP 16 13   , CBC   Recent Labs   Lab 01/29/25  0523 01/30/25  0403   WBC 4.91 4.40   HGB 11.3* 10.5*   HCT 34.8* 32.9*   * 110*   , INR   Lab Results   Component Value Date    INR 1.4 (H) 01/24/2025    INR 1.3 (H) 01/23/2025    INR 1.2 10/24/2024   , Lipid Panel   Lab Results   Component Value Date    CHOL 122 08/04/2024    HDL 25 (L) 08/04/2024    LDLCALC 71.0 08/04/2024    TRIG 130 08/04/2024    CHOLHDL 20.5 08/04/2024   , Troponin   Recent Labs   Lab 01/23/25  1923 01/23/25  2221 01/24/25  0427   TROPONINI 0.059* 0.064* 0.066*   , and A1C:   Recent Labs   Lab 08/04/24  1349 12/27/24  0959   HGBA1C 6.8* 6.5*       Pending Diagnostic Studies:       Procedure Component Value Units Date/Time    Echo [[id number]]     Order Status: Sent Lab Status: No result            Medications:  Reconciled Home Medications:      Medication List        START taking these medications      docusate sodium 100 MG capsule  Commonly known as: COLACE  Take 1 capsule (100 mg total) by mouth 2 (two) times daily as needed for Constipation.            CHANGE how you take these medications      furosemide 40 MG tablet  Commonly known as: LASIX  Hold over the weekend and starting on Monday, February 3rd start taking one tablet by mouth every other day.  What changed:   how much to take  how to take this  when to take this  additional instructions     midodrine 2.5 MG Tab  Commonly known as: PROAMATINE  Take 1 tablet (2.5 mg total) by mouth 2 (two) times daily with meals.  What changed:   medication strength  how much to take  when to take this            CONTINUE taking these medications      ACCU-CHEK PAUL CONTROL SOLN Soln  Generic drug: blood glucose control high,low  1 drop by NOT APPLICABLE route once daily.     ACCU-CHEK  SOFTCLIX LANCETS Misc  Generic drug: lancets  TEST BLOOD SUGAR THREE TIMES DAILY     alcohol swabs Padm  Commonly known as: DROPSAFE ALCOHOL PREP PADS  Apply 1 each topically once daily.     allopurinoL 100 MG tablet  Commonly known as: ZYLOPRIM  Take 4 tablets (400 mg total) by mouth once daily.     amiodarone 200 MG Tab  Commonly known as: PACERONE  Take 1 tablet (200 mg total) by mouth 2 (two) times daily.     apixaban 5 mg Tab  Commonly known as: ELIQUIS  Take 1 tablet (5 mg total) by mouth 2 (two) times daily.     aspirin 81 MG EC tablet  Commonly known as: ECOTRIN  Take 1 tablet (81 mg total) by mouth once daily.     blood sugar diagnostic Strp  Commonly known as: ACCU-CHEK GUIDE TEST STRIPS  1 each by Other route 3 (three) times daily. Test Blood Sugar     blood-glucose meter Misc  Commonly known as: ACCU-CHEK GUIDE GLUCOSE METER  Accucheck BID     brimonidine 0.2% 0.2 % Drop  Commonly known as: ALPHAGAN  Place 1 drop into both eyes 2 (two) times a day.     colchicine 0.6 mg tablet  Commonly known as: COLCRYS  Take 1 tablet (0.6 mg total) by mouth every other day.     famotidine 20 MG tablet  Commonly known as: PEPCID  Take 1 tablet (20 mg total) by mouth once daily.     ferrous sulfate 325 mg (65 mg iron) Tab tablet  Commonly known as: IRON  Take 1 tablet (325 mg total) by mouth every other day.     glimepiride 4 MG tablet  Commonly known as: AMARYL  TAKE 1 TABLET TWICE DAILY BEFORE MEALS     metoprolol succinate 25 MG 24 hr tablet  Commonly known as: TOPROL-XL  Take 1 tablet (25 mg total) by mouth once daily.     ondansetron 4 MG tablet  Commonly known as: ZOFRAN  Take 1 tablet (4 mg total) by mouth every 8 (eight) hours as needed.     pantoprazole 40 MG tablet  Commonly known as: PROTONIX  TAKE 1 TABLET ONE TIME DAILY     pravastatin 40 MG tablet  Commonly known as: PRAVACHOL  Take 1 tablet (40 mg total) by mouth once daily.     sodium bicarbonate 650 MG tablet  Take 2 tablets (1,300 mg total) by mouth 2  (two) times daily.     tamsulosin 0.4 mg Cap  Commonly known as: FLOMAX  Take 1 capsule (0.4 mg total) by mouth once daily.            STOP taking these medications      hydrALAZINE 10 MG tablet  Commonly known as: APRESOLINE     HYDROcodone-acetaminophen 5-325 mg per tablet  Commonly known as: NORCO     LIDOcaine 5 %  Commonly known as: LIDODERM     magnesium oxide 400 mg (241.3 mg magnesium) tablet  Commonly known as: MAG-OX     temazepam 15 mg Cap  Commonly known as: RESTORIL     traMADoL 50 mg tablet  Commonly known as: ULTRAM              Indwelling Lines/Drains at time of discharge:   Lines/Drains/Airways       Drain  Duration                  Ileostomy 01/01/84 RUQ [id number] days                    Time spent on the discharge of patient: 35 minutes         Poli Damon MD  Department of Hospital Medicine  [facility] ([address])

## 2025-01-31 ENCOUNTER — PATIENT OUTREACH (OUTPATIENT)
Dept: ADMINISTRATIVE | Facility: CLINIC | Age: 75
End: 2025-01-31
Payer: MEDICARE

## 2025-01-31 NOTE — PROGRESS NOTES
C3 nurse attempted to contact Jarrett Charlton Jr.  for a TCC post hospital discharge follow up call. The patient is unable to conduct the call @ this time. The patient requested a callback.    The patient has a scheduled [facility] appointment with Raquel Goodwin MD on 02/06/2025 @ 0800.

## 2025-01-31 NOTE — PROGRESS NOTES
C3 nurse spoke with Jarrett Charlton Jr.  for a TCC post hospital discharge follow up call. The patient has a scheduled [facility] appointment with Raquel Goodwin MD on 02/06/2025 @ 0800.

## 2025-01-31 NOTE — TELEPHONE ENCOUNTER
Patient states he is taking the following medications:  Allopurinol 150 mg tablet once daily  Aspirin 81 mg tablet once daily

## 2025-02-03 ENCOUNTER — HOSPITAL ENCOUNTER (INPATIENT)
Facility: HOSPITAL | Age: 75
LOS: 8 days | Discharge: HOME | DRG: 291 | End: 2025-02-12
Attending: EMERGENCY MEDICINE | Admitting: INTERNAL MEDICINE
Payer: MEDICARE

## 2025-02-03 DIAGNOSIS — K86.89 PANCREATIC MASS: ICD-10-CM

## 2025-02-03 DIAGNOSIS — I50.9 ACUTE ON CHRONIC CONGESTIVE HEART FAILURE, UNSPECIFIED HEART FAILURE TYPE: Primary | ICD-10-CM

## 2025-02-03 DIAGNOSIS — R60.9 EDEMA: ICD-10-CM

## 2025-02-03 DIAGNOSIS — R94.31 EKG ABNORMALITIES: ICD-10-CM

## 2025-02-03 LAB
ALBUMIN SERPL BCP-MCNC: 2.9 G/DL (ref 3.5–5.2)
ALP SERPL-CCNC: 143 U/L (ref 40–150)
ALT SERPL W/O P-5'-P-CCNC: 34 U/L (ref 10–44)
ANION GAP SERPL CALC-SCNC: 11 MMOL/L (ref 8–16)
AST SERPL-CCNC: 32 U/L (ref 10–40)
BASOPHILS # BLD AUTO: 0.01 K/UL (ref 0–0.2)
BASOPHILS NFR BLD: 0.2 % (ref 0–1.9)
BILIRUB SERPL-MCNC: 1.1 MG/DL (ref 0.1–1)
BNP SERPL-MCNC: 2750 PG/ML (ref 0–99)
BUN SERPL-MCNC: 62 MG/DL (ref 8–23)
CALCIUM SERPL-MCNC: 8.2 MG/DL (ref 8.7–10.5)
CHLORIDE SERPL-SCNC: 102 MMOL/L (ref 95–110)
CO2 SERPL-SCNC: 20 MMOL/L (ref 23–29)
CREAT SERPL-MCNC: 2.5 MG/DL (ref 0.5–1.4)
DIFFERENTIAL METHOD BLD: ABNORMAL
EOSINOPHIL # BLD AUTO: 0.1 K/UL (ref 0–0.5)
EOSINOPHIL NFR BLD: 1.5 % (ref 0–8)
ERYTHROCYTE [DISTWIDTH] IN BLOOD BY AUTOMATED COUNT: 19.6 % (ref 11.5–14.5)
EST. GFR  (NO RACE VARIABLE): 26.1 ML/MIN/1.73 M^2
GLUCOSE SERPL-MCNC: 111 MG/DL (ref 70–110)
HCT VFR BLD AUTO: 37.2 % (ref 40–54)
HCV AB SERPL QL IA: NORMAL
HGB BLD-MCNC: 11.1 G/DL (ref 14–18)
HIV 1+2 AB+HIV1 P24 AG SERPL QL IA: NORMAL
IMM GRANULOCYTES # BLD AUTO: 0.02 K/UL (ref 0–0.04)
IMM GRANULOCYTES NFR BLD AUTO: 0.3 % (ref 0–0.5)
LIPASE SERPL-CCNC: 66 U/L (ref 4–60)
LYMPHOCYTES # BLD AUTO: 0.7 K/UL (ref 1–4.8)
LYMPHOCYTES NFR BLD: 11.2 % (ref 18–48)
MCH RBC QN AUTO: 27.3 PG (ref 27–31)
MCHC RBC AUTO-ENTMCNC: 29.8 G/DL (ref 32–36)
MCV RBC AUTO: 92 FL (ref 82–98)
MONOCYTES # BLD AUTO: 0.4 K/UL (ref 0.3–1)
MONOCYTES NFR BLD: 6.3 % (ref 4–15)
NEUTROPHILS # BLD AUTO: 5 K/UL (ref 1.8–7.7)
NEUTROPHILS NFR BLD: 80.5 % (ref 38–73)
NRBC BLD-RTO: 0 /100 WBC
OHS QRS DURATION: 136 MS
OHS QTC CALCULATION: 511 MS
PLATELET # BLD AUTO: 118 K/UL (ref 150–450)
PMV BLD AUTO: 10.5 FL (ref 9.2–12.9)
POCT GLUCOSE: 90 MG/DL (ref 70–110)
POTASSIUM SERPL-SCNC: 4.8 MMOL/L (ref 3.5–5.1)
PROT SERPL-MCNC: 6 G/DL (ref 6–8.4)
RBC # BLD AUTO: 4.06 M/UL (ref 4.6–6.2)
SODIUM SERPL-SCNC: 133 MMOL/L (ref 136–145)
TROPONIN I SERPL DL<=0.01 NG/ML-MCNC: 42 NG/L (ref 0–35)
WBC # BLD AUTO: 6.15 K/UL (ref 3.9–12.7)

## 2025-02-03 PROCEDURE — 86803 HEPATITIS C AB TEST: CPT | Mod: HCNC | Performed by: PHYSICIAN ASSISTANT

## 2025-02-03 PROCEDURE — 84484 ASSAY OF TROPONIN QUANT: CPT | Mod: HCNC

## 2025-02-03 PROCEDURE — G0378 HOSPITAL OBSERVATION PER HR: HCPCS | Mod: HCNC

## 2025-02-03 PROCEDURE — 83690 ASSAY OF LIPASE: CPT | Mod: HCNC

## 2025-02-03 PROCEDURE — 99291 CRITICAL CARE FIRST HOUR: CPT | Mod: HCNC

## 2025-02-03 PROCEDURE — 83880 ASSAY OF NATRIURETIC PEPTIDE: CPT | Mod: HCNC

## 2025-02-03 PROCEDURE — 87389 HIV-1 AG W/HIV-1&-2 AB AG IA: CPT | Mod: HCNC | Performed by: PHYSICIAN ASSISTANT

## 2025-02-03 PROCEDURE — 96375 TX/PRO/DX INJ NEW DRUG ADDON: CPT | Mod: HCNC

## 2025-02-03 PROCEDURE — 63600175 PHARM REV CODE 636 W HCPCS: Mod: HCNC | Performed by: INTERNAL MEDICINE

## 2025-02-03 PROCEDURE — 80053 COMPREHEN METABOLIC PANEL: CPT | Mod: HCNC

## 2025-02-03 PROCEDURE — 85025 COMPLETE CBC W/AUTO DIFF WBC: CPT | Mod: HCNC

## 2025-02-03 PROCEDURE — 25000003 PHARM REV CODE 250: Mod: HCNC | Performed by: INTERNAL MEDICINE

## 2025-02-03 PROCEDURE — 63600175 PHARM REV CODE 636 W HCPCS: Mod: HCNC

## 2025-02-03 PROCEDURE — 96374 THER/PROPH/DIAG INJ IV PUSH: CPT | Mod: HCNC

## 2025-02-03 PROCEDURE — 25000003 PHARM REV CODE 250: Mod: HCNC | Performed by: HOSPITALIST

## 2025-02-03 PROCEDURE — 25000003 PHARM REV CODE 250: Mod: HCNC

## 2025-02-03 RX ORDER — ASPIRIN 81 MG/1
81 TABLET ORAL DAILY
Status: DISCONTINUED | OUTPATIENT
Start: 2025-02-03 | End: 2025-02-12 | Stop reason: HOSPADM

## 2025-02-03 RX ORDER — COLCHICINE 0.6 MG/1
0.6 TABLET, FILM COATED ORAL EVERY OTHER DAY
Status: DISCONTINUED | OUTPATIENT
Start: 2025-02-04 | End: 2025-02-12 | Stop reason: HOSPADM

## 2025-02-03 RX ORDER — ONDANSETRON 4 MG/1
4 TABLET, FILM COATED ORAL EVERY 8 HOURS PRN
Status: DISCONTINUED | OUTPATIENT
Start: 2025-02-04 | End: 2025-02-12 | Stop reason: HOSPADM

## 2025-02-03 RX ORDER — FUROSEMIDE 10 MG/ML
40 INJECTION INTRAMUSCULAR; INTRAVENOUS DAILY
Status: DISCONTINUED | OUTPATIENT
Start: 2025-02-03 | End: 2025-02-04

## 2025-02-03 RX ORDER — PREDNISONE 2.5 MG/1
2.5 TABLET ORAL EVERY OTHER DAY
COMMUNITY
Start: 2024-11-13

## 2025-02-03 RX ORDER — DIPHENHYDRAMINE HYDROCHLORIDE 50 MG/ML
12.5 INJECTION INTRAMUSCULAR; INTRAVENOUS
Status: COMPLETED | OUTPATIENT
Start: 2025-02-03 | End: 2025-02-03

## 2025-02-03 RX ORDER — AMIODARONE HYDROCHLORIDE 200 MG/1
200 TABLET ORAL 2 TIMES DAILY
Status: DISCONTINUED | OUTPATIENT
Start: 2025-02-03 | End: 2025-02-11

## 2025-02-03 RX ORDER — DOCUSATE SODIUM 100 MG/1
100 CAPSULE, LIQUID FILLED ORAL 2 TIMES DAILY PRN
Status: DISCONTINUED | OUTPATIENT
Start: 2025-02-03 | End: 2025-02-12 | Stop reason: HOSPADM

## 2025-02-03 RX ORDER — HYDROXYZINE HYDROCHLORIDE 25 MG/1
25 TABLET, FILM COATED ORAL 3 TIMES DAILY PRN
Status: DISCONTINUED | OUTPATIENT
Start: 2025-02-03 | End: 2025-02-12 | Stop reason: HOSPADM

## 2025-02-03 RX ORDER — MIDODRINE HYDROCHLORIDE 2.5 MG/1
2.5 TABLET ORAL 2 TIMES DAILY WITH MEALS
Status: DISCONTINUED | OUTPATIENT
Start: 2025-02-03 | End: 2025-02-04

## 2025-02-03 RX ORDER — ALLOPURINOL 100 MG/1
400 TABLET ORAL DAILY
Status: DISCONTINUED | OUTPATIENT
Start: 2025-02-04 | End: 2025-02-07

## 2025-02-03 RX ORDER — GLUCAGON 1 MG
1 KIT INJECTION
Status: DISCONTINUED | OUTPATIENT
Start: 2025-02-03 | End: 2025-02-03

## 2025-02-03 RX ORDER — INSULIN ASPART 100 [IU]/ML
0-5 INJECTION, SOLUTION INTRAVENOUS; SUBCUTANEOUS
Status: DISCONTINUED | OUTPATIENT
Start: 2025-02-03 | End: 2025-02-03

## 2025-02-03 RX ORDER — FUROSEMIDE 10 MG/ML
40 INJECTION INTRAMUSCULAR; INTRAVENOUS
Status: COMPLETED | OUTPATIENT
Start: 2025-02-03 | End: 2025-02-03

## 2025-02-03 RX ORDER — BRIMONIDINE TARTRATE 2 MG/ML
1 SOLUTION/ DROPS OPHTHALMIC 2 TIMES DAILY
Status: DISCONTINUED | OUTPATIENT
Start: 2025-02-03 | End: 2025-02-12 | Stop reason: HOSPADM

## 2025-02-03 RX ORDER — HYDROXYZINE PAMOATE 25 MG/1
25 CAPSULE ORAL
Status: COMPLETED | OUTPATIENT
Start: 2025-02-03 | End: 2025-02-03

## 2025-02-03 RX ORDER — IBUPROFEN 200 MG
16 TABLET ORAL
Status: DISCONTINUED | OUTPATIENT
Start: 2025-02-03 | End: 2025-02-03

## 2025-02-03 RX ORDER — METOPROLOL SUCCINATE 25 MG/1
25 TABLET, EXTENDED RELEASE ORAL DAILY
Status: DISCONTINUED | OUTPATIENT
Start: 2025-02-03 | End: 2025-02-06

## 2025-02-03 RX ORDER — IBUPROFEN 200 MG
24 TABLET ORAL
Status: DISCONTINUED | OUTPATIENT
Start: 2025-02-03 | End: 2025-02-03

## 2025-02-03 RX ORDER — SODIUM BICARBONATE 650 MG/1
1300 TABLET ORAL 2 TIMES DAILY
Status: DISCONTINUED | OUTPATIENT
Start: 2025-02-03 | End: 2025-02-12 | Stop reason: HOSPADM

## 2025-02-03 RX ORDER — FAMOTIDINE 20 MG/1
20 TABLET, FILM COATED ORAL DAILY
Status: DISCONTINUED | OUTPATIENT
Start: 2025-02-03 | End: 2025-02-04

## 2025-02-03 RX ORDER — TAMSULOSIN HYDROCHLORIDE 0.4 MG/1
0.4 CAPSULE ORAL DAILY
Status: DISCONTINUED | OUTPATIENT
Start: 2025-02-03 | End: 2025-02-06

## 2025-02-03 RX ORDER — ASPIRIN 81 MG
100 TABLET, DELAYED RELEASE (ENTERIC COATED) ORAL 2 TIMES DAILY PRN
COMMUNITY

## 2025-02-03 RX ADMIN — BRIMONIDINE TARTRATE 1 DROP: 2 SOLUTION OPHTHALMIC at 11:02

## 2025-02-03 RX ADMIN — ASPIRIN 81 MG: 81 TABLET, COATED ORAL at 04:02

## 2025-02-03 RX ADMIN — TAMSULOSIN HYDROCHLORIDE 0.4 MG: 0.4 CAPSULE ORAL at 04:02

## 2025-02-03 RX ADMIN — FUROSEMIDE 40 MG: 10 INJECTION, SOLUTION INTRAVENOUS at 11:02

## 2025-02-03 RX ADMIN — FUROSEMIDE 40 MG: 10 INJECTION, SOLUTION INTRAVENOUS at 04:02

## 2025-02-03 RX ADMIN — APIXABAN 5 MG: 5 TABLET, FILM COATED ORAL at 08:02

## 2025-02-03 RX ADMIN — SODIUM BICARBONATE 650 MG TABLET 1300 MG: at 08:02

## 2025-02-03 RX ADMIN — DIPHENHYDRAMINE HYDROCHLORIDE 12.5 MG: 50 INJECTION, SOLUTION INTRAMUSCULAR; INTRAVENOUS at 11:02

## 2025-02-03 RX ADMIN — FAMOTIDINE 20 MG: 20 TABLET, FILM COATED ORAL at 04:02

## 2025-02-03 RX ADMIN — AMIODARONE HYDROCHLORIDE 200 MG: 200 TABLET ORAL at 08:02

## 2025-02-03 RX ADMIN — MIDODRINE HYDROCHLORIDE 2.5 MG: 2.5 TABLET ORAL at 06:02

## 2025-02-03 RX ADMIN — HYDROXYZINE HYDROCHLORIDE 25 MG: 25 TABLET, FILM COATED ORAL at 08:02

## 2025-02-03 RX ADMIN — HYDROXYZINE PAMOATE 25 MG: 25 CAPSULE ORAL at 02:02

## 2025-02-03 NOTE — HPI
74 y.o. male with a PMH of CAD s/p PCI to mid LAD in 2017, Hx of VT, Atrial tachycardia s/p AT ablation in 4/2024 and DVT on Eliquis, CHF with the EF of 35%, SBO, s/p colostomy, hypertension, hyperlipidemia, and DM2 presenting to the ED for worsening lower extremity swelling and abdominal pain. Patient was recently discharged from [facility] on 1/30 for workup of DVT, found to be superficial vein thrombosis. Hospital course c/b GRADY. Patient also found to have new pancreatic lesion with plans for outpatient ERCP. Patient states since discharge he has had worsening abdominal cramping and bloating. Notes decreased ostomy output. Was initially passing gas but states he has not been able to for the past 3 days. Denies fever, chills, N/V. Patient also reporting worsening BLE edema since discharge. States he was told to stop taking his home lasix due to his GRADY with plans to restart in on Monday, 2/10. Patient also notes 3 weeks of generalized pruritus that has progressively worsened.

## 2025-02-03 NOTE — Clinical Note
Diagnosis: Acute on chronic congestive heart failure, unspecified heart failure type [[id number]]   Future Attending Provider: ROSEMARIE MURHPY [[id number]]   Is the patient being sent to ED Observation?: No

## 2025-02-03 NOTE — ASSESSMENT & PLAN NOTE
Creatine stable for now. BMP reviewed- noted Estimated Creatinine Clearance: 24.6 mL/min (A) (based on SCr of 2.5 mg/dL (H)). according to latest data. Based on current GFR, CKD stage is stage 4 - GFR 15-29.  Monitor UOP and serial BMP and adjust therapy as needed. Renally dose meds. Avoid nephrotoxic medications and procedures.

## 2025-02-03 NOTE — ASSESSMENT & PLAN NOTE
Patient has Systolic (HFrEF) heart failure that is Acute. On presentation their CHF was decompensated. Evidence of decompensated CHF on presentation includes: edema. The etiology of their decompensation is likely medication non-compliance. Most recent BNP and echo results are listed below.  Recent Labs     02/03/25  1033   BNP 2,750*     Latest ECHO  Results for orders placed during the hospital encounter of 01/24/25    Echo    Interpretation Summary    Left Ventricle: The left ventricle is mildly dilated. Ventricular mass is normal. Normal wall thickness. Severe global hypokinesis present. There is severely reduced systolic function. Ejection fraction is approximately 30%. Grade I diastolic dysfunction.    Right Ventricle: Normal right ventricular cavity size. Wall thickness is normal. Right ventricle wall motion has global hypokinesis. Systolic function is moderately reduced.    Left Atrium: Left atrium is severely dilated.    Right Atrium: Right atrium is moderately dilated.    Aortic Valve: The aortic valve is a trileaflet valve. There is mild aortic valve sclerosis. There is mild aortic regurgitation with a centrally directed jet.    Mitral Valve: There is severe regurgitation with a centrally directed jet.    Tricuspid Valve: There is moderate to severe regurgitation with a centrally directed jet.    Pulmonary Artery: There is mild to moderate pulmonary hypertension. The estimated pulmonary artery systolic pressure is 42 mmHg.    IVC/SVC: Elevated venous pressure at 15 mmHg.    Pericardium: There is a small circumferential effusion. Left pleural effusion.    Current Heart Failure Medications  metoprolol succinate (TOPROL-XL) 24 hr tablet 25 mg, Daily, Oral  furosemide injection 40 mg, Daily, Intravenous    Plan  - Monitor strict I&Os and daily weights.    - Place on telemetry  - Low sodium diet  - Place on fluid restriction of 1 L.   lasix40 mg daily

## 2025-02-03 NOTE — SUBJECTIVE & OBJECTIVE
Past Medical History:   Diagnosis Date    Anticoagulant long-term use     Basal cell carcinoma     BPH (benign prostatic hyperplasia)     s/p TURP    Carotid stenosis     Chronic kidney disease     Claudication     DDD (degenerative disc disease) 10/21/2013    Diabetes mellitus with renal complications     Disc disease, degenerative, cervical     DVT (deep venous thrombosis)     Encounter for blood transfusion     GERD (gastroesophageal reflux disease)     Gout, chronic     History of ulcerative colitis     s/p colectomy and ileostomy    HLD (hyperlipidemia)     HTN (hypertension)     Ileostomy in place 1982    Paroxysmal atrial fibrillation     RBBB     Squamous cell carcinoma 03/08/2018    Left superior helix near insertion    Squamous cell carcinoma 04/12/2018    Left forearm x 5    Syncope 07/06/2024    Ventricular tachycardia        Past Surgical History:   Procedure Laterality Date    ABLATION, SVT, ACCESSORY PATHWAY N/A 4/30/2024    Procedure: Ablation, SVT, Accessory Pathway;  Surgeon: Franky Taylor MD;  Location: [facility] EP LAB;  Service: Cardiology;  Laterality: N/A;  VT, RFA, Carto, MAC, GP, 3 Prep *MDT ILR in situ*    cardiac stents      CATARACT EXTRACTION Bilateral     CERVICAL FUSION      CHOLECYSTECTOMY N/A 2/14/2023    Procedure: CHOLECYSTECTOMY;  Surgeon: Mike Joyce MD;  Location: [facility] OR;  Service: General;  Laterality: N/A;  very high probabilty of converison to open    colectomy and ileostomy  1985    ENDOSCOPIC ULTRASOUND OF UPPER GASTROINTESTINAL TRACT N/A 2/10/2023    Procedure: ULTRASOUND, UPPER GI TRACT, ENDOSCOPIC;  Surgeon: Poli Fuller MD;  Location: [facility] ENDO;  Service: Endoscopy;  Laterality: N/A;    ERCP N/A 2/10/2023    Procedure: ERCP (ENDOSCOPIC RETROGRADE CHOLANGIOPANCREATOGRAPHY);  Surgeon: Poli Fuller MD;  Location: [facility] ENDO;  Service: Endoscopy;  Laterality: N/A;    EXCISION OF PAROTID GLAND Left 12/18/2020    Procedure: EXCISION, PAROTID GLAND;  Surgeon: Michael  BETTY Pinzon MD;  Location: [facility] OR 2ND FLR;  Service: ENT;  Laterality: Left;    INSERTION OF IMPLANTABLE LOOP RECORDER  06/07/2021    INSERTION OF IMPLANTABLE LOOP RECORDER Left 6/7/2021    Procedure: INSERTION, IMPLANTABLE LOOP RECORDER;  Surgeon: Romario Dao MD;  Location: [facility] CATH LAB;  Service: Cardiology;  Laterality: Left;    LEFT HEART CATHETERIZATION Right 4/15/2021    Procedure: CATHETERIZATION, HEART, LEFT;  Surgeon: Marcio Jones MD;  Location: [facility] CATH LAB;  Service: Cardiology;  Laterality: Right;    LYSIS OF ADHESIONS N/A 11/9/2020    Procedure: LYSIS, ADHESIONS,  ERAS low;  Surgeon: CED Haley MD;  Location: [facility] OR 2ND FLR;  Service: Colon and Rectal;  Laterality: N/A;    LYSIS OF ADHESIONS N/A 2/14/2023    Procedure: LYSIS, ADHESIONS;  Surgeon: Mike Joyce MD;  Location: [facility];  Service: General;  Laterality: N/A;    POUCHOSCOPY N/A 4/6/2022    Procedure: ENDOSCOPY, POUCH, SMALL INTESTINE, DIAGNOSTIC;  Surgeon: Dieter Juarez MD;  Location: [facility] ENDO (2ND FLR);  Service: Endoscopy;  Laterality: N/A;    REPAIR, HERNIA, PARASTOMAL N/A 11/9/2020    Procedure: REPAIR, HERNIA, PARASTOMAL;  Surgeon: CED Haley MD;  Location: [facility] OR 2ND FLR;  Service: Colon and Rectal;  Laterality: N/A;    TRANSURETHRAL RESECTION OF PROSTATE (TURP) WITHOUT USE OF LASER N/A 1/23/2019    Procedure: TURP, WITHOUT USING LASER BIPOLAR;  Surgeon: Catarino Mota MD;  Location: [facility] OR 1ST FLR;  Service: Urology;  Laterality: N/A;  1.5 HOURS    TREATMENT OF CARDIAC ARRHYTHMIA  4/30/2024    Procedure: Cardioversion or Defibrillation;  Surgeon: Franky Taylor MD;  Location: [facility] EP LAB;  Service: Cardiology;;       Review of patient's allergies indicates:   Allergen Reactions    Atorvastatin     Rosuvastatin        Current Facility-Administered Medications on File Prior to Encounter   Medication    leuprolide acetate (6 month) injection 45 mg    sodium chloride 0.9% in 1,000 mL infusion      Current Outpatient Medications on File Prior to Encounter   Medication Sig    allopurinoL (ZYLOPRIM) 100 MG tablet Take 4 tablets (400 mg total) by mouth once daily. (Patient taking differently: Take 150 mg by mouth every other day.)    amiodarone (PACERONE) 200 MG Tab Take 1 tablet (200 mg total) by mouth 2 (two) times daily.    antiox.mv no.10/omeg3s/lut/gilma (I-CAPS ORAL) Take 1 capsule by mouth once daily.    apixaban (ELIQUIS) 5 mg Tab Take 1 tablet (5 mg total) by mouth 2 (two) times daily.    brimonidine 0.2% (ALPHAGAN) 0.2 % Drop Place 1 drop into both eyes 2 (two) times a day.    colchicine (COLCRYS) 0.6 mg tablet Take 1 tablet (0.6 mg total) by mouth every other day.    docusate sodium 100 mg capsule Take 100 mg by mouth 2 (two) times daily as needed for Constipation.    ferrous sulfate (IRON) 325 mg (65 mg iron) Tab tablet Take 1 tablet (325 mg total) by mouth every other day.    furosemide (LASIX) 40 MG tablet Hold over the weekend and starting on Monday, February 3rd start taking one tablet by mouth every other day.    glimepiride (AMARYL) 4 MG tablet TAKE 1 TABLET TWICE DAILY BEFORE MEALS    metoprolol succinate (TOPROL-XL) 25 MG 24 hr tablet Take 1 tablet (25 mg total) by mouth once daily.    midodrine (PROAMATINE) 2.5 MG Tab Take 1 tablet (2.5 mg total) by mouth 2 (two) times daily with meals.    pantoprazole (PROTONIX) 40 MG tablet TAKE 1 TABLET ONE TIME DAILY    pravastatin (PRAVACHOL) 40 MG tablet Take 1 tablet (40 mg total) by mouth once daily.    predniSONE (DELTASONE) 2.5 MG tablet Take 2.5 mg by mouth every other day.    sodium bicarbonate 650 MG tablet Take 2 tablets (1,300 mg total) by mouth 2 (two) times daily.    tamsulosin (FLOMAX) 0.4 mg Cap Take 1 capsule (0.4 mg total) by mouth once daily.    ACCU-CHEK PAUL CONTROL SOLN Soln 1 drop by NOT APPLICABLE route once daily.    ACCU-CHEK SOFTCLIX LANCETS [facility] TEST BLOOD SUGAR THREE TIMES DAILY    alcohol swabs (DROPSAFE ALCOHOL PREP PADS)  PadM Apply 1 each topically once daily.    aspirin (ECOTRIN) 81 MG EC tablet Take 1 tablet (81 mg total) by mouth once daily. (Patient not taking: Reported on 2/3/2025)    blood sugar diagnostic (ACCU-CHEK GUIDE TEST STRIPS) Strp 1 each by Other route 3 (three) times daily. Test Blood Sugar    blood-glucose meter (ACCU-CHEK GUIDE GLUCOSE METER) Misc Accucheck BID    [DISCONTINUED] docusate sodium (COLACE) 100 MG capsule Take 1 capsule (100 mg total) by mouth 2 (two) times daily as needed for Constipation. (Patient not taking: Reported on 1/31/2025)    [DISCONTINUED] famotidine (PEPCID) 20 MG tablet Take 1 tablet (20 mg total) by mouth once daily.    [DISCONTINUED] ondansetron (ZOFRAN) 4 MG tablet Take 1 tablet (4 mg total) by mouth every 8 (eight) hours as needed.     Family History       Problem Relation (Age of Onset)    Cancer Father    Dementia Mother    Diabetes Father    Heart disease Father          Tobacco Use    Smoking status: Never     Passive exposure: Never    Smokeless tobacco: Never   Substance and Sexual Activity    Alcohol use: No    Drug use: No    Sexual activity: Not Currently     Partners: Female     Review of Systems   Constitutional:  Positive for activity change and appetite change.   HENT:  Positive for congestion.    Eyes: Negative.    Respiratory: Negative.     Cardiovascular: Negative.    Gastrointestinal:  Positive for abdominal distention and abdominal pain.   Endocrine: Negative.    Genitourinary: Negative.    Musculoskeletal: Negative.    Allergic/Immunologic: Negative.    Neurological: Negative.    Hematological: Negative.    Psychiatric/Behavioral: Negative.       Objective:     Vital Signs (Most Recent):  Temp: 98.3 °F (36.8 °C) (02/03/25 1530)  Pulse: 86 (02/03/25 1530)  Resp: 18 (02/03/25 1530)  BP: 111/71 (02/03/25 1530)  SpO2: 99 % (02/03/25 1530) Vital Signs (24h Range):  Temp:  [97.6 °F (36.4 °C)-98.3 °F (36.8 °C)] 98.3 °F (36.8 °C)  Pulse:  [] 86  Resp:  [16-22]  18  SpO2:  [98 %-100 %] 99 %  BP: ()/(70-81) 111/71     Weight: 68 kg (150 lb)  Body mass index is 19.26 kg/m².     Physical Exam  Constitutional:       Appearance: Normal appearance.   HENT:      Nose: Nose normal.   Eyes:      Pupils: Pupils are equal, round, and reactive to light.   Cardiovascular:      Rate and Rhythm: Normal rate.   Pulmonary:      Effort: Pulmonary effort is normal.   Abdominal:      General: There is distension.      Tenderness: There is abdominal tenderness.   Neurological:      Mental Status: He is oriented to person, place, and time.              CRANIAL NERVES     CN III, IV, VI   Pupils are equal, round, and reactive to light.       Significant Labs: All pertinent labs within the past 24 hours have been reviewed.  BMP:   Recent Labs   Lab 02/03/25  1033   *   *   K 4.8      CO2 20*   BUN 62*   CREATININE 2.5*   CALCIUM 8.2*       Significant Imaging: I have reviewed all pertinent imaging results/findings within the past 24 hours.

## 2025-02-03 NOTE — ED TRIAGE NOTES
75 y/o M presents to ER with c/c bilateral LE swelling and 8/10 pain. Discharged s/p ileostomy placement due to SBO. States that legs have increased in swelling in last week, but got worse in pain over night.

## 2025-02-03 NOTE — ED NOTES
Assumed care of the patient. Report received from GHASSAN Paez. Pt on continuous cardiac monitoring. Pt in hospital gown, side rails up X2, bed low and locked, and call light is placed within reach. 1 family/visitors at bedside at this time. Pt complaining of upper extremity itching, upper back itchiness.

## 2025-02-03 NOTE — ED NOTES
Provider notified that patient's itchiness to upper extremities continue despite medication admin.

## 2025-02-03 NOTE — PHARMACY MED REC
"Admission Medication History     The home medication history was taken by Koby Huff.    You may go to "Admission" then "Reconcile Home Medications" tabs to review and/or act upon these items.     The home medication list has been updated by the Pharmacy department.   Please read ALL comments highlighted in yellow.   Please address this information as you see fit.    Feel free to contact us if you have any questions or require assistance.      The medications listed below were removed from the home medication list. Please reorder if appropriate:  Patient reports no longer taking the following medication(s):  FAMOTIDINE 20 MG  ONDANSETRON 4 MG    Medications listed below were obtained from: Patient/family and Analytic software- [employer]  Current Outpatient Medications on File Prior to Encounter   Medication Sig    allopurinoL (ZYLOPRIM) 100 MG tablet Take 1-1/2 tablet (150 mg) by mouth every other day.    amiodarone (PACERONE) 200 MG Tab Take 1 tablet (200 mg total) by mouth 2 (two) times daily.    antiox.mv no.10/omeg3s/lut/gilma (I-CAPS ORAL) Take 1 capsule by mouth once daily.    apixaban (ELIQUIS) 5 mg Tab Take 1 tablet (5 mg total) by mouth 2 (two) times daily.    brimonidine 0.2% (ALPHAGAN) 0.2 % Drop Place 1 drop into both eyes 2 (two) times a day.    colchicine (COLCRYS) 0.6 mg tablet Take 1 tablet (0.6 mg total) by mouth every other day.    docusate sodium 100 mg capsule Take 100 mg by mouth 2 (two) times daily as needed for Constipation.    ferrous sulfate (IRON) 325 mg (65 mg iron) Tab tablet Take 1 tablet (325 mg total) by mouth every other day.    furosemide (LASIX) 40 MG tablet Hold over the weekend and starting on Monday, February 3rd start taking one tablet by mouth every other day.    glimepiride (AMARYL) 4 MG tablet Take 1 tablet twice daily before meals    metoprolol succinate (TOPROL-XL) 25 MG 24 hr tablet Take 1 tablet (25 mg total) by mouth once daily.    midodrine (PROAMATINE) 2.5 MG Tab Take 1 " tablet (2.5 mg total) by mouth 2 (two) times daily with meals.    pantoprazole (PROTONIX) 40 MG tablet Take 1 tablet by mouth one time daily.    pravastatin (PRAVACHOL) 40 MG tablet Take 1 tablet (40 mg total) by mouth once daily.    predniSONE (DELTASONE) 2.5 MG tablet Take 2.5 mg by mouth every other day.    sodium bicarbonate 650 MG tablet Take 2 tablets (1,300 mg total) by mouth 2 (two) times daily.    tamsulosin (FLOMAX) 0.4 mg Cap Take 1 capsule (0.4 mg total) by mouth once daily.    ACCU-CHEK PAUL CONTROL SOLN Soln 1 drop by NOT APPLICABLE route once daily.    ACCU-CHEK SOFTCLIX LANCETS [facility] TEST BLOOD SUGAR THREE TIMES DAILY    alcohol swabs (DROPSAFE ALCOHOL PREP PADS) Pad Apply 1 each topically once daily.    blood sugar diagnostic (ACCU-CHEK GUIDE TEST STRIPS) Strp 1 each by Other route 3 (three) times daily. Test Blood Sugar    blood-glucose meter (ACCU-CHEK GUIDE GLUCOSE METER) [facility] Accucheck BID       Potential issues to be addressed PRIOR TO DISCHARGE  Patient reported not taking the following medications: (ASPIRIN). These medications remain on the home medication list. Please address accordingly.             Koby Huff  EXT [id number]                .

## 2025-02-03 NOTE — ED PROVIDER NOTES
Encounter Date: 2/3/2025       History     Chief Complaint   Patient presents with    Leg Swelling     Was just discharged from [facility] for a SBO, now having leg swelling     74 y.o. male with a PMH of CAD s/p PCI to mid LAD in 2017, Hx of VT, Atrial tachycardia s/p AT ablation in 4/2024 and DVT on Eliquis, CHF with the EF of 35%, SBO, s/p colostomy, hypertension, hyperlipidemia, and DM2 presenting to the ED for worsening lower extremity swelling and abdominal pain. Patient was recently discharged from [facility] on 1/30 for workup of DVT, found to be superficial vein thrombosis. Hospital course c/b GRADY. Patient also found to have new pancreatic lesion with plans for outpatient ERCP. Patient states since discharge he has had worsening abdominal cramping and bloating. Notes decreased ostomy output. Was initially passing gas but states he has not been able to for the past 3 days. Denies fever, chills, N/V. Patient also reporting worsening BLE edema since discharge. States he was told to stop taking his home lasix due to his GRADY with plans to restart in on Monday, 2/10. Patient also notes 3 weeks of generalized pruritus that has progressively worsened.        Review of patient's allergies indicates:   Allergen Reactions    Atorvastatin     Rosuvastatin      Past Medical History:   Diagnosis Date    Anticoagulant long-term use     Basal cell carcinoma     BPH (benign prostatic hyperplasia)     s/p TURP    Carotid stenosis     Chronic kidney disease     Claudication     DDD (degenerative disc disease) 10/21/2013    Diabetes mellitus with renal complications     Disc disease, degenerative, cervical     DVT (deep venous thrombosis)     Encounter for blood transfusion     GERD (gastroesophageal reflux disease)     Gout, chronic     History of ulcerative colitis     s/p colectomy and ileostomy    HLD (hyperlipidemia)     HTN (hypertension)     Ileostomy in place 1982    Paroxysmal atrial fibrillation     RBBB     Squamous  cell carcinoma 03/08/2018    Left superior helix near insertion    Squamous cell carcinoma 04/12/2018    Left forearm x 5    Syncope 07/06/2024    Ventricular tachycardia      Past Surgical History:   Procedure Laterality Date    ABLATION, SVT, ACCESSORY PATHWAY N/A 4/30/2024    Procedure: Ablation, SVT, Accessory Pathway;  Surgeon: Franky Tayolr MD;  Location: [facility] EP LAB;  Service: Cardiology;  Laterality: N/A;  VT, RFA, Carto, MAC, GP, 3 Prep *MDT ILR in situ*    cardiac stents      CATARACT EXTRACTION Bilateral     CERVICAL FUSION      CHOLECYSTECTOMY N/A 2/14/2023    Procedure: CHOLECYSTECTOMY;  Surgeon: Mike Joyce MD;  Location: [facility] OR;  Service: General;  Laterality: N/A;  very high probabilty of converison to open    colectomy and ileostomy  1985    ENDOSCOPIC ULTRASOUND OF UPPER GASTROINTESTINAL TRACT N/A 2/10/2023    Procedure: ULTRASOUND, UPPER GI TRACT, ENDOSCOPIC;  Surgeon: Poli Fuller MD;  Location: [facility] ENDO;  Service: Endoscopy;  Laterality: N/A;    ERCP N/A 2/10/2023    Procedure: ERCP (ENDOSCOPIC RETROGRADE CHOLANGIOPANCREATOGRAPHY);  Surgeon: Poli Fuller MD;  Location: [facility] ENDO;  Service: Endoscopy;  Laterality: N/A;    EXCISION OF PAROTID GLAND Left 12/18/2020    Procedure: EXCISION, PAROTID GLAND;  Surgeon: Michael Pinzon MD;  Location: [address];  Service: ENT;  Laterality: Left;    INSERTION OF IMPLANTABLE LOOP RECORDER  06/07/2021    INSERTION OF IMPLANTABLE LOOP RECORDER Left 6/7/2021    Procedure: INSERTION, IMPLANTABLE LOOP RECORDER;  Surgeon: Romario Dao MD;  Location: [facility] CATH LAB;  Service: Cardiology;  Laterality: Left;    LEFT HEART CATHETERIZATION Right 4/15/2021    Procedure: CATHETERIZATION, HEART, LEFT;  Surgeon: Marcio Jones MD;  Location: [facility] CATH LAB;  Service: Cardiology;  Laterality: Right;    LYSIS OF ADHESIONS N/A 11/9/2020    Procedure: LYSIS, ADHESIONS,  ERAS low;  Surgeon: CED Haley MD;  Location: [address];   Service: Colon and Rectal;  Laterality: N/A;    LYSIS OF ADHESIONS N/A 2/14/2023    Procedure: LYSIS, ADHESIONS;  Surgeon: Mike Joyce MD;  Location: [facility];  Service: General;  Laterality: N/A;    POUCHOSCOPY N/A 4/6/2022    Procedure: ENDOSCOPY, POUCH, SMALL INTESTINE, DIAGNOSTIC;  Surgeon: Dieter Juarez MD;  Location: [facility] ENDO (2ND FLR);  Service: Endoscopy;  Laterality: N/A;    REPAIR, HERNIA, PARASTOMAL N/A 11/9/2020    Procedure: REPAIR, HERNIA, PARASTOMAL;  Surgeon: CED Haley MD;  Location: [facility] OR 2ND FLR;  Service: Colon and Rectal;  Laterality: N/A;    TRANSURETHRAL RESECTION OF PROSTATE (TURP) WITHOUT USE OF LASER N/A 1/23/2019    Procedure: TURP, WITHOUT USING LASER BIPOLAR;  Surgeon: Catarino Mota MD;  Location: [facility] 1ST FLR;  Service: Urology;  Laterality: N/A;  1.5 HOURS    TREATMENT OF CARDIAC ARRHYTHMIA  4/30/2024    Procedure: Cardioversion or Defibrillation;  Surgeon: Franky Taylor MD;  Location: [facility] EP LAB;  Service: Cardiology;;     Family History   Problem Relation Name Age of Onset    Dementia Mother      Cancer Father      Diabetes Father      Heart disease Father      Melanoma Neg Hx      Hypertension Neg Hx      Arthritis Neg Hx       Social History     Tobacco Use    Smoking status: Never     Passive exposure: Never    Smokeless tobacco: Never   Substance Use Topics    Alcohol use: No    Drug use: No     Review of Systems   Constitutional:  Positive for appetite change. Negative for chills and fever.   HENT:  Negative for congestion.    Respiratory:  Negative for shortness of breath.    Cardiovascular:  Positive for leg swelling. Negative for chest pain and palpitations.   Gastrointestinal:  Positive for abdominal distention and abdominal pain. Negative for blood in stool, diarrhea, nausea and vomiting.   Genitourinary:  Negative for difficulty urinating.   Neurological:  Negative for dizziness, light-headedness and headaches.       Physical Exam     Initial  Vitals [02/03/25 0917]   BP Pulse Resp Temp SpO2   108/72 104 16 97.6 °F (36.4 °C) 99 %      MAP       --         Physical Exam    Constitutional: No distress.   HENT:   Head: Normocephalic and atraumatic. Mouth/Throat: Oropharynx is clear and moist.   Eyes: Conjunctivae and EOM are normal. No scleral icterus.   Neck:   Normal range of motion.  Cardiovascular:  Normal rate, regular rhythm, normal heart sounds and intact distal pulses.           Pulmonary/Chest: Breath sounds normal. No respiratory distress.   Abdominal: He exhibits no distension. There is abdominal tenderness (epigastric).   Ostomy bag in place with scant output. There is no rebound and no guarding.   Musculoskeletal:         General: Edema (BLE pitting edema) present.      Cervical back: Normal range of motion.     Neurological: He is alert and oriented to person, place, and time.   Skin:   Dry, scaley pruritic lesions tracking down patients arms and within webbing of digits bilaterally         ED Course   Procedures  Labs Reviewed   HEPATITIS C ANTIBODY   HIV 1 / 2 ANTIBODY   CBC W/ AUTO DIFFERENTIAL   B-TYPE NATRIURETIC PEPTIDE   COMPREHENSIVE METABOLIC PANEL   LIPASE   TROPONIN I HIGH SENSITIVITY     EKG Readings: (Independently Interpreted)   Initial Reading: No STEMI.   NSR. Ventricular rate ~80.       Imaging Results    None          Medications - No data to display  Medical Decision Making  74 y.o. male with complex PMH presenting with abdominal pain, decreased ostomy output, worsening BLE edema, and generalized pruritus. Differential includes but not limited to SBO 2/2 pancreatic lesion, pancreatitis, CHF exacerbation, DVT, Pruritus: 2/2 hyperbilirubinemia from biliary obstruction, scabies, dry skin. Lipase mildly elevated. CT A/P without evidence of obstruction or acute pancreatitis. CT with evidence of volume overload. Patient given 1x dose IV lasix 40. Received benadryl for pruritus without relief. Given a dose of hydroxyzine for  persistent pruritus. Derm consulted for evaluation of pruritic lesions. Patient admitted to hospital medicine for further management of suspected CHF exacerbation.     Amount and/or Complexity of Data Reviewed  Labs: ordered. Decision-making details documented in ED Course.  Radiology: ordered. Decision-making details documented in ED Course.    Risk  Prescription drug management.               ED Course as of 02/03/25 1501   Mon Feb 03, 2025   1305 CT Abdomen Pelvis  Without Contrast  No evidence of obstruction. Volume overloaded. [TL]   1458 Brain natriuretic peptide(!)  Elevated. Patient reported not taking his home lasix since he was discharged on 1/30 to allow for recovery of his GRADY. Volume overloaded on exam. Given one time dose IV lasix 40 [TL]   1459 Lipase(!)  Mildly elevated. Similar to prior. Has known pancreatic lesion. [TL]   1500 Troponin I High Sensitivity(!)  Slightly elevated. Patient not experiencing chest pain. No evidence of acute ischemia on EKG. [TL]   1501 Hemoglobin(!): 11.1  Stable anemia. [TL]      ED Course User Index  [TL] Carmela Musa MD                             Clinical Impression:  Final diagnoses:  [R60.9] Edema                 Carmela Musa MD  Resident  02/03/25 1508

## 2025-02-03 NOTE — H&P
[facility] Emergency Dept  [facility]  History & Physical    Patient Name: Jarrett Charlton Jr.  MRN: [id number]  Patient Class: OP- Observation  Admission Date: 2/3/2025  Attending Physician: Jamar Medrano MD   Primary Care Provider: Tripp Mohan MD         Patient information was obtained from patient and ER records.     Subjective:     Principal Problem:<principal problem not specified>    Chief Complaint:   Chief Complaint   Patient presents with    Leg Swelling     Was just discharged from [facility] for a SBO, now having leg swelling        HPI: 74 y.o. male with a PMH of CAD s/p PCI to mid LAD in 2017, Hx of VT, Atrial tachycardia s/p AT ablation in 4/2024 and DVT on Eliquis, CHF with the EF of 35%, SBO, s/p colostomy, hypertension, hyperlipidemia, and DM2 presenting to the ED for worsening lower extremity swelling and abdominal pain. Patient was recently discharged from [facility] on 1/30 for workup of DVT, found to be superficial vein thrombosis. Hospital course c/b GRADY. Patient also found to have new pancreatic lesion with plans for outpatient ERCP. Patient states since discharge he has had worsening abdominal cramping and bloating. Notes decreased ostomy output. Was initially passing gas but states he has not been able to for the past 3 days. Denies fever, chills, N/V. Patient also reporting worsening BLE edema since discharge. States he was told to stop taking his home lasix due to his GRADY with plans to restart in on Monday, 2/10. Patient also notes 3 weeks of generalized pruritus that has progressively worsened.     Past Medical History:   Diagnosis Date    Anticoagulant long-term use     Basal cell carcinoma     BPH (benign prostatic hyperplasia)     s/p TURP    Carotid stenosis     Chronic kidney disease     Claudication     DDD (degenerative disc disease) 10/21/2013    Diabetes mellitus with renal complications     Disc disease, degenerative, cervical     DVT (deep venous thrombosis)      Encounter for blood transfusion     GERD (gastroesophageal reflux disease)     Gout, chronic     History of ulcerative colitis     s/p colectomy and ileostomy    HLD (hyperlipidemia)     HTN (hypertension)     Ileostomy in place 1982    Paroxysmal atrial fibrillation     RBBB     Squamous cell carcinoma 03/08/2018    Left superior helix near insertion    Squamous cell carcinoma 04/12/2018    Left forearm x 5    Syncope 07/06/2024    Ventricular tachycardia        Past Surgical History:   Procedure Laterality Date    ABLATION, SVT, ACCESSORY PATHWAY N/A 4/30/2024    Procedure: Ablation, SVT, Accessory Pathway;  Surgeon: Franky Taylor MD;  Location: [facility] EP LAB;  Service: Cardiology;  Laterality: N/A;  VT, RFA, Carto, MAC, GP, 3 Prep *MDT ILR in situ*    cardiac stents      CATARACT EXTRACTION Bilateral     CERVICAL FUSION      CHOLECYSTECTOMY N/A 2/14/2023    Procedure: CHOLECYSTECTOMY;  Surgeon: Mike Joyce MD;  Location: [facility];  Service: General;  Laterality: N/A;  very high probabilty of converison to open    colectomy and ileostomy  1985    ENDOSCOPIC ULTRASOUND OF UPPER GASTROINTESTINAL TRACT N/A 2/10/2023    Procedure: ULTRASOUND, UPPER GI TRACT, ENDOSCOPIC;  Surgeon: Poli Fuller MD;  Location: [facility] ENDO;  Service: Endoscopy;  Laterality: N/A;    ERCP N/A 2/10/2023    Procedure: ERCP (ENDOSCOPIC RETROGRADE CHOLANGIOPANCREATOGRAPHY);  Surgeon: Poli Fuller MD;  Location: [facility] ENDO;  Service: Endoscopy;  Laterality: N/A;    EXCISION OF PAROTID GLAND Left 12/18/2020    Procedure: EXCISION, PAROTID GLAND;  Surgeon: Michael Pinzon MD;  Location: [address] FLR;  Service: ENT;  Laterality: Left;    INSERTION OF IMPLANTABLE LOOP RECORDER  06/07/2021    INSERTION OF IMPLANTABLE LOOP RECORDER Left 6/7/2021    Procedure: INSERTION, IMPLANTABLE LOOP RECORDER;  Surgeon: Romario Dao MD;  [place] [facility] [place];  Service: Cardiology;  Laterality: Left;    LEFT HEART CATHETERIZATION Right 4/15/2021     Procedure: CATHETERIZATION, HEART, LEFT;  Surgeon: Marcio Jones MD;  [place] [facility] [place];  Service: Cardiology;  Laterality: Right;    LYSIS OF ADHESIONS N/A 11/9/2020    Procedure: LYSIS, ADHESIONS,  ERAS low;  Surgeon: CED Haley MD;  Location: [facility] OR 2ND FLR;  Service: Colon and Rectal;  Laterality: N/A;    LYSIS OF ADHESIONS N/A 2/14/2023    Procedure: LYSIS, ADHESIONS;  Surgeon: Mike Joyce MD;  Location: [facility] OR;  Service: General;  Laterality: N/A;    POUCHOSCOPY N/A 4/6/2022    Procedure: ENDOSCOPY, POUCH, SMALL INTESTINE, DIAGNOSTIC;  Surgeon: Dieter Juarez MD;  Location: [facility] ENDO (2ND FLR);  Service: Endoscopy;  Laterality: N/A;    REPAIR, HERNIA, PARASTOMAL N/A 11/9/2020    Procedure: REPAIR, HERNIA, PARASTOMAL;  Surgeon: CED Haley MD;  Location: [facility] OR 2ND FLR;  Service: Colon and Rectal;  Laterality: N/A;    TRANSURETHRAL RESECTION OF PROSTATE (TURP) WITHOUT USE OF LASER N/A 1/23/2019    Procedure: TURP, WITHOUT USING LASER BIPOLAR;  Surgeon: Catarino Mota MD;  Location: [facility] OR 1ST FLR;  Service: Urology;  Laterality: N/A;  1.5 HOURS    TREATMENT OF CARDIAC ARRHYTHMIA  4/30/2024    Procedure: Cardioversion or Defibrillation;  Surgeon: Franky Taylor MD;  Location: [facility] EP LAB;  Service: Cardiology;;       Review of patient's allergies indicates:   Allergen Reactions    Atorvastatin     Rosuvastatin        Current Facility-Administered Medications on File Prior to Encounter   Medication    leuprolide acetate (6 month) injection 45 mg    sodium chloride 0.9% in 1,000 mL infusion     Current Outpatient Medications on File Prior to Encounter   Medication Sig    allopurinoL (ZYLOPRIM) 100 MG tablet Take 4 tablets (400 mg total) by mouth once daily. (Patient taking differently: Take 150 mg by mouth every other day.)    amiodarone (PACERONE) 200 MG Tab Take 1 tablet (200 mg total) by mouth 2 (two) times daily.    antiox.mv no.10/omeg3s/lut/gilma (I-CAPS ORAL) Take  1 capsule by mouth once daily.    apixaban (ELIQUIS) 5 mg Tab Take 1 tablet (5 mg total) by mouth 2 (two) times daily.    brimonidine 0.2% (ALPHAGAN) 0.2 % Drop Place 1 drop into both eyes 2 (two) times a day.    colchicine (COLCRYS) 0.6 mg tablet Take 1 tablet (0.6 mg total) by mouth every other day.    docusate sodium 100 mg capsule Take 100 mg by mouth 2 (two) times daily as needed for Constipation.    ferrous sulfate (IRON) 325 mg (65 mg iron) Tab tablet Take 1 tablet (325 mg total) by mouth every other day.    furosemide (LASIX) 40 MG tablet Hold over the weekend and starting on Monday, February 3rd start taking one tablet by mouth every other day.    glimepiride (AMARYL) 4 MG tablet TAKE 1 TABLET TWICE DAILY BEFORE MEALS    metoprolol succinate (TOPROL-XL) 25 MG 24 hr tablet Take 1 tablet (25 mg total) by mouth once daily.    midodrine (PROAMATINE) 2.5 MG Tab Take 1 tablet (2.5 mg total) by mouth 2 (two) times daily with meals.    pantoprazole (PROTONIX) 40 MG tablet TAKE 1 TABLET ONE TIME DAILY    pravastatin (PRAVACHOL) 40 MG tablet Take 1 tablet (40 mg total) by mouth once daily.    predniSONE (DELTASONE) 2.5 MG tablet Take 2.5 mg by mouth every other day.    sodium bicarbonate 650 MG tablet Take 2 tablets (1,300 mg total) by mouth 2 (two) times daily.    tamsulosin (FLOMAX) 0.4 mg Cap Take 1 capsule (0.4 mg total) by mouth once daily.    ACCU-CHEK PAUL CONTROL SOLN Soln 1 drop by NOT APPLICABLE route once daily.    ACCU-CHEK SOFTCLIX LANCETS [facility] TEST BLOOD SUGAR THREE TIMES DAILY    alcohol swabs (DROPSAFE ALCOHOL PREP PADS) PadM Apply 1 each topically once daily.    aspirin (ECOTRIN) 81 MG EC tablet Take 1 tablet (81 mg total) by mouth once daily. (Patient not taking: Reported on 2/3/2025)    blood sugar diagnostic (ACCU-CHEK GUIDE TEST STRIPS) Strp 1 each by Other route 3 (three) times daily. Test Blood Sugar    blood-glucose meter (ACCU-CHEK GUIDE GLUCOSE METER) [facility] Accucheck BID    [DISCONTINUED]  docusate sodium (COLACE) 100 MG capsule Take 1 capsule (100 mg total) by mouth 2 (two) times daily as needed for Constipation. (Patient not taking: Reported on 1/31/2025)    [DISCONTINUED] famotidine (PEPCID) 20 MG tablet Take 1 tablet (20 mg total) by mouth once daily.    [DISCONTINUED] ondansetron (ZOFRAN) 4 MG tablet Take 1 tablet (4 mg total) by mouth every 8 (eight) hours as needed.     Family History       Problem Relation (Age of Onset)    Cancer Father    Dementia Mother    Diabetes Father    Heart disease Father          Tobacco Use    Smoking status: Never     Passive exposure: Never    Smokeless tobacco: Never   Substance and Sexual Activity    Alcohol use: No    Drug use: No    Sexual activity: Not Currently     Partners: Female     Review of Systems   Constitutional:  Positive for activity change and appetite change.   HENT:  Positive for congestion.    Eyes: Negative.    Respiratory: Negative.     Cardiovascular: Negative.    Gastrointestinal:  Positive for abdominal distention and abdominal pain.   Endocrine: Negative.    Genitourinary: Negative.    Musculoskeletal: Negative.    Allergic/Immunologic: Negative.    Neurological: Negative.    Hematological: Negative.    Psychiatric/Behavioral: Negative.       Objective:     Vital Signs (Most Recent):  Temp: 98.3 °F (36.8 °C) (02/03/25 1530)  Pulse: 86 (02/03/25 1530)  Resp: 18 (02/03/25 1530)  BP: 111/71 (02/03/25 1530)  SpO2: 99 % (02/03/25 1530) Vital Signs (24h Range):  Temp:  [97.6 °F (36.4 °C)-98.3 °F (36.8 °C)] 98.3 °F (36.8 °C)  Pulse:  [] 86  Resp:  [16-22] 18  SpO2:  [98 %-100 %] 99 %  BP: ()/(70-81) 111/71     Weight: 68 kg (150 lb)  Body mass index is 19.26 kg/m².     Physical Exam  Constitutional:       Appearance: Normal appearance.   HENT:      Nose: Nose normal.   Eyes:      Pupils: Pupils are equal, round, and reactive to light.   Cardiovascular:      Rate and Rhythm: Normal rate.   Pulmonary:      Effort: Pulmonary effort is  normal.   Abdominal:      General: There is distension.      Tenderness: There is abdominal tenderness.   Neurological:      Mental Status: He is oriented to person, place, and time.              CRANIAL NERVES     CN III, IV, VI   Pupils are equal, round, and reactive to light.       Significant Labs: All pertinent labs within the past 24 hours have been reviewed.  BMP:   Recent Labs   Lab 02/03/25  1033   *   *   K 4.8      CO2 20*   BUN 62*   CREATININE 2.5*   CALCIUM 8.2*       Significant Imaging: I have reviewed all pertinent imaging results/findings within the past 24 hours.  Assessment/Plan:     History of DVT (deep vein thrombosis)  C/W elequis  Recheck U/S      CHF (congestive heart failure), NYHA class IV  Patient has Systolic (HFrEF) heart failure that is Acute. On presentation their CHF was decompensated. Evidence of decompensated CHF on presentation includes: edema. The etiology of their decompensation is likely medication non-compliance. Most recent BNP and echo results are listed below.  Recent Labs     02/03/25  1033   BNP 2,750*     Latest ECHO  Results for orders placed during the hospital encounter of 01/24/25    Echo    Interpretation Summary    Left Ventricle: The left ventricle is mildly dilated. Ventricular mass is normal. Normal wall thickness. Severe global hypokinesis present. There is severely reduced systolic function. Ejection fraction is approximately 30%. Grade I diastolic dysfunction.    Right Ventricle: Normal right ventricular cavity size. Wall thickness is normal. Right ventricle wall motion has global hypokinesis. Systolic function is moderately reduced.    Left Atrium: Left atrium is severely dilated.    Right Atrium: Right atrium is moderately dilated.    Aortic Valve: The aortic valve is a trileaflet valve. There is mild aortic valve sclerosis. There is mild aortic regurgitation with a centrally directed jet.    Mitral Valve: There is severe regurgitation  with a centrally directed jet.    Tricuspid Valve: There is moderate to severe regurgitation with a centrally directed jet.    Pulmonary Artery: There is mild to moderate pulmonary hypertension. The estimated pulmonary artery systolic pressure is 42 mmHg.    IVC/SVC: Elevated venous pressure at 15 mmHg.    Pericardium: There is a small circumferential effusion. Left pleural effusion.    Current Heart Failure Medications  metoprolol succinate (TOPROL-XL) 24 hr tablet 25 mg, Daily, Oral  furosemide injection 40 mg, Daily, Intravenous    Plan  - Monitor strict I&Os and daily weights.    - Place on telemetry  - Low sodium diet  - Place on fluid restriction of 1 L.   lasix40 mg daily        Ileostomy in place  Stable      History of ulcerative colitis  Stable      Chronic renal impairment  Creatine stable for now. BMP reviewed- noted Estimated Creatinine Clearance: 24.6 mL/min (A) (based on SCr of 2.5 mg/dL (H)). according to latest data. Based on current GFR, CKD stage is stage 4 - GFR 15-29.  Monitor UOP and serial BMP and adjust therapy as needed. Renally dose meds. Avoid nephrotoxic medications and procedures.      VTE Risk Mitigation (From admission, onward)           Ordered     apixaban tablet 5 mg  2 times daily         02/03/25 1429                       On 02/03/2025, patient should be placed in hospital observation services under my care.             Jamar Medrano MD  Department of Hospital Medicine  [facility] - Emergency Dept

## 2025-02-04 ENCOUNTER — DOCUMENTATION ONLY (OUTPATIENT)
Dept: CARDIOLOGY | Facility: CLINIC | Age: 75
End: 2025-02-04
Payer: MEDICARE

## 2025-02-04 LAB
ALLENS TEST: ABNORMAL
ANION GAP SERPL CALC-SCNC: 14 MMOL/L (ref 8–16)
BUN SERPL-MCNC: 57 MG/DL (ref 8–23)
CALCIUM SERPL-MCNC: 8.3 MG/DL (ref 8.7–10.5)
CHLORIDE SERPL-SCNC: 102 MMOL/L (ref 95–110)
CO2 SERPL-SCNC: 23 MMOL/L (ref 23–29)
CREAT SERPL-MCNC: 2.4 MG/DL (ref 0.5–1.4)
EST. GFR  (NO RACE VARIABLE): 27.5 ML/MIN/1.73 M^2
GLUCOSE SERPL-MCNC: 95 MG/DL (ref 70–110)
LACTATE SERPL-SCNC: 1.9 MMOL/L (ref 0.5–2.2)
LDH SERPL L TO P-CCNC: 1.95 MMOL/L (ref 0.36–1.25)
POCT GLUCOSE: 119 MG/DL (ref 70–110)
POCT GLUCOSE: 132 MG/DL (ref 70–110)
POCT GLUCOSE: 191 MG/DL (ref 70–110)
POCT GLUCOSE: 222 MG/DL (ref 70–110)
POCT GLUCOSE: 56 MG/DL (ref 70–110)
POCT GLUCOSE: 58 MG/DL (ref 70–110)
POCT GLUCOSE: 98 MG/DL (ref 70–110)
POTASSIUM SERPL-SCNC: 3.8 MMOL/L (ref 3.5–5.1)
SAMPLE: ABNORMAL
SITE: ABNORMAL
SODIUM SERPL-SCNC: 139 MMOL/L (ref 136–145)

## 2025-02-04 PROCEDURE — 25000003 PHARM REV CODE 250: Mod: HCNC | Performed by: INTERNAL MEDICINE

## 2025-02-04 PROCEDURE — 93005 ELECTROCARDIOGRAM TRACING: CPT | Mod: HCNC

## 2025-02-04 PROCEDURE — 83605 ASSAY OF LACTIC ACID: CPT | Mod: HCNC | Performed by: INTERNAL MEDICINE

## 2025-02-04 PROCEDURE — 20600001 HC STEP DOWN PRIVATE ROOM: Mod: HCNC

## 2025-02-04 PROCEDURE — 63600175 PHARM REV CODE 636 W HCPCS: Mod: HCNC | Performed by: INTERNAL MEDICINE

## 2025-02-04 PROCEDURE — 80048 BASIC METABOLIC PNL TOTAL CA: CPT | Mod: HCNC | Performed by: INTERNAL MEDICINE

## 2025-02-04 PROCEDURE — 25000003 PHARM REV CODE 250: Mod: HCNC | Performed by: HOSPITALIST

## 2025-02-04 PROCEDURE — 83605 ASSAY OF LACTIC ACID: CPT | Mod: HCNC

## 2025-02-04 PROCEDURE — 99900035 HC TECH TIME PER 15 MIN (STAT): Mod: HCNC

## 2025-02-04 RX ORDER — DEXTROSE MONOHYDRATE AND SODIUM CHLORIDE 5; .45 G/100ML; G/100ML
INJECTION, SOLUTION INTRAVENOUS CONTINUOUS
Status: DISCONTINUED | OUTPATIENT
Start: 2025-02-04 | End: 2025-02-04

## 2025-02-04 RX ORDER — FAMOTIDINE 20 MG/1
20 TABLET, FILM COATED ORAL EVERY OTHER DAY
Status: DISCONTINUED | OUTPATIENT
Start: 2025-02-06 | End: 2025-02-12 | Stop reason: HOSPADM

## 2025-02-04 RX ORDER — MIDODRINE HYDROCHLORIDE 2.5 MG/1
2.5 TABLET ORAL 2 TIMES DAILY WITH MEALS
Status: DISCONTINUED | OUTPATIENT
Start: 2025-02-04 | End: 2025-02-04

## 2025-02-04 RX ORDER — IBUPROFEN 200 MG
24 TABLET ORAL
Status: DISCONTINUED | OUTPATIENT
Start: 2025-02-04 | End: 2025-02-12 | Stop reason: HOSPADM

## 2025-02-04 RX ORDER — GLUCAGON 1 MG
1 KIT INJECTION
Status: DISCONTINUED | OUTPATIENT
Start: 2025-02-04 | End: 2025-02-12 | Stop reason: HOSPADM

## 2025-02-04 RX ORDER — INSULIN ASPART 100 [IU]/ML
0-5 INJECTION, SOLUTION INTRAVENOUS; SUBCUTANEOUS
Status: DISCONTINUED | OUTPATIENT
Start: 2025-02-04 | End: 2025-02-12 | Stop reason: HOSPADM

## 2025-02-04 RX ORDER — MIDODRINE HYDROCHLORIDE 5 MG/1
10 TABLET ORAL
Status: DISCONTINUED | OUTPATIENT
Start: 2025-02-04 | End: 2025-02-10

## 2025-02-04 RX ORDER — DEXTROSE MONOHYDRATE AND SODIUM CHLORIDE 5; .9 G/100ML; G/100ML
INJECTION, SOLUTION INTRAVENOUS CONTINUOUS
Status: DISCONTINUED | OUTPATIENT
Start: 2025-02-04 | End: 2025-02-04

## 2025-02-04 RX ORDER — IBUPROFEN 200 MG
16 TABLET ORAL
Status: DISCONTINUED | OUTPATIENT
Start: 2025-02-04 | End: 2025-02-12 | Stop reason: HOSPADM

## 2025-02-04 RX ADMIN — APIXABAN 5 MG: 5 TABLET, FILM COATED ORAL at 08:02

## 2025-02-04 RX ADMIN — MIDODRINE HYDROCHLORIDE 2.5 MG: 2.5 TABLET ORAL at 01:02

## 2025-02-04 RX ADMIN — MIDODRINE HYDROCHLORIDE 10 MG: 5 TABLET ORAL at 02:02

## 2025-02-04 RX ADMIN — ALLOPURINOL 400 MG: 100 TABLET ORAL at 08:02

## 2025-02-04 RX ADMIN — BRIMONIDINE TARTRATE 1 DROP: 2 SOLUTION OPHTHALMIC at 09:02

## 2025-02-04 RX ADMIN — METOPROLOL SUCCINATE 25 MG: 25 TABLET, EXTENDED RELEASE ORAL at 08:02

## 2025-02-04 RX ADMIN — BRIMONIDINE TARTRATE 1 DROP: 2 SOLUTION OPHTHALMIC at 08:02

## 2025-02-04 RX ADMIN — AMIODARONE HYDROCHLORIDE 200 MG: 200 TABLET ORAL at 09:02

## 2025-02-04 RX ADMIN — FAMOTIDINE 20 MG: 20 TABLET, FILM COATED ORAL at 08:02

## 2025-02-04 RX ADMIN — SODIUM BICARBONATE 650 MG TABLET 1300 MG: at 08:02

## 2025-02-04 RX ADMIN — SODIUM BICARBONATE 650 MG TABLET 1300 MG: at 09:02

## 2025-02-04 RX ADMIN — APIXABAN 5 MG: 5 TABLET, FILM COATED ORAL at 09:02

## 2025-02-04 RX ADMIN — HYDROXYZINE HYDROCHLORIDE 25 MG: 25 TABLET, FILM COATED ORAL at 09:02

## 2025-02-04 RX ADMIN — TAMSULOSIN HYDROCHLORIDE 0.4 MG: 0.4 CAPSULE ORAL at 08:02

## 2025-02-04 RX ADMIN — COLCHICINE 0.6 MG: 0.6 TABLET ORAL at 08:02

## 2025-02-04 RX ADMIN — FUROSEMIDE 40 MG: 10 INJECTION, SOLUTION INTRAVENOUS at 08:02

## 2025-02-04 RX ADMIN — SODIUM CHLORIDE, POTASSIUM CHLORIDE, SODIUM LACTATE AND CALCIUM CHLORIDE 500 ML: 600; 310; 30; 20 INJECTION, SOLUTION INTRAVENOUS at 01:02

## 2025-02-04 RX ADMIN — DEXTROSE AND SODIUM CHLORIDE: 5; 900 INJECTION, SOLUTION INTRAVENOUS at 09:02

## 2025-02-04 RX ADMIN — ASPIRIN 81 MG: 81 TABLET, COATED ORAL at 08:02

## 2025-02-04 RX ADMIN — AMIODARONE HYDROCHLORIDE 200 MG: 200 TABLET ORAL at 08:02

## 2025-02-04 NOTE — CONSULTS
[facility] - Emergency Dept  Dermatology  Consult Note    Patient Name: Jarrett Charlton Jr.  MRN: [id number]  Admission Date: 2/3/2025  Hospital Length of Stay: 0 days  Attending Physician: Jamar Medrano MD  Primary Care Provider: Tripp Mohan MD     Inpatient consult to Dermatology  Consult performed by: Mohini Schmidt MD  Consult ordered by: Jamar Medrano MD        Subjective:     Principal Problem: heart failure exacerbation    HPI: 74yoM with PMHx of UC s/p colectomy, CHF, CAD s/p PCI, DVT,   Dermatology was consulted for evaluation of itching present on forearms and concern for scabies. The patient reports that for past 3 weeks he has had itching on scalp, back, and forearms. Denies itching to abdomen or legs. Denies any itching rash with any other family members. The patient reports having dry skin and has not been moisturizing recently.  The patient uses [place] spring soap. He has had multiple visits to the ED over the past few weeks and was found to have new pancreatic lesion with plans to get a outpatient ERCP.      Scheduled Meds:   allopurinoL  400 mg Oral Daily    amiodarone  200 mg Oral BID    apixaban  5 mg Oral BID    aspirin  81 mg Oral Daily    brimonidine 0.2%  1 drop Both Eyes BID    colchicine  0.6 mg Oral Every other day    famotidine  20 mg Oral Daily    furosemide (LASIX) injection  40 mg Intravenous Daily    leuprolide acetate (6 month)  45 mg Intramuscular Q6 Months    metoprolol succinate  25 mg Oral Daily    midodrine  2.5 mg Oral BID WM    sodium bicarbonate  1,300 mg Oral BID    tamsulosin  0.4 mg Oral Daily     Continuous Infusions:   D5 and 0.9% NaCl   Intravenous Continuous 75 mL/hr at 02/04/25 0945 New Bag at 02/04/25 0945     PRN Meds:.  Current Facility-Administered Medications:     docusate sodium, 100 mg, Oral, BID PRN    hydrOXYzine HCL, 25 mg, Oral, TID PRN    ondansetron, 4 mg, Oral, Q8H PRN    Review of patient's allergies indicates:   Allergen Reactions    Atorvastatin      Rosuvastatin        Past Medical History:   Diagnosis Date    Anticoagulant long-term use     Basal cell carcinoma     BPH (benign prostatic hyperplasia)     s/p TURP    Carotid stenosis     Chronic kidney disease     Claudication     DDD (degenerative disc disease) 10/21/2013    Diabetes mellitus with renal complications     Disc disease, degenerative, cervical     DVT (deep venous thrombosis)     Encounter for blood transfusion     GERD (gastroesophageal reflux disease)     Gout, chronic     History of ulcerative colitis     s/p colectomy and ileostomy    HLD (hyperlipidemia)     HTN (hypertension)     Ileostomy in place 1982    Paroxysmal atrial fibrillation     RBBB     Squamous cell carcinoma 03/08/2018    Left superior helix near insertion    Squamous cell carcinoma 04/12/2018    Left forearm x 5    Syncope 07/06/2024    Ventricular tachycardia        Past Surgical History:   Procedure Laterality Date    ABLATION, SVT, ACCESSORY PATHWAY N/A 4/30/2024    Procedure: Ablation, SVT, Accessory Pathway;  Surgeon: Franky Taylor MD;  Location: [facility] EP LAB;  Service: Cardiology;  Laterality: N/A;  VT, RFA, Carto, MAC, GP, 3 Prep *MDT ILR in situ*    cardiac stents      CATARACT EXTRACTION Bilateral     CERVICAL FUSION      CHOLECYSTECTOMY N/A 2/14/2023    Procedure: CHOLECYSTECTOMY;  Surgeon: Mike Joyce MD;  Location: [facility] OR;  Service: General;  Laterality: N/A;  very high probabilty of converison to open    colectomy and ileostomy  1985    ENDOSCOPIC ULTRASOUND OF UPPER GASTROINTESTINAL TRACT N/A 2/10/2023    Procedure: ULTRASOUND, UPPER GI TRACT, ENDOSCOPIC;  Surgeon: Poli Fuller MD;  Location: [facility] ENDO;  Service: Endoscopy;  Laterality: N/A;    ERCP N/A 2/10/2023    Procedure: ERCP (ENDOSCOPIC RETROGRADE CHOLANGIOPANCREATOGRAPHY);  Surgeon: Poli Fuller MD;  Location: [facility] ENDO;  Service: Endoscopy;  Laterality: N/A;    EXCISION OF PAROTID GLAND Left 12/18/2020    Procedure: EXCISION, PAROTID GLAND;   Surgeon: Michael Pinzon MD;  Location: [facility] OR 2ND FLR;  Service: ENT;  Laterality: Left;    INSERTION OF IMPLANTABLE LOOP RECORDER  06/07/2021    INSERTION OF IMPLANTABLE LOOP RECORDER Left 6/7/2021    Procedure: INSERTION, IMPLANTABLE LOOP RECORDER;  Surgeon: Romario Dao MD;  Location: [facility] [place] CATH LAB;  Service: Cardiology;  Laterality: Left;    LEFT HEART CATHETERIZATION Right 4/15/2021    Procedure: CATHETERIZATION, HEART, LEFT;  Surgeon: Marcio Jones MD;  Location: [facility] [place] CATH LAB;  Service: Cardiology;  Laterality: Right;    LYSIS OF ADHESIONS N/A 11/9/2020    Procedure: LYSIS, ADHESIONS,  ERAS low;  Surgeon: CED Haley MD;  Location: [facility] OR 2ND FLR;  Service: Colon and Rectal;  Laterality: N/A;    LYSIS OF ADHESIONS N/A 2/14/2023    Procedure: LYSIS, ADHESIONS;  Surgeon: Mike Joyce MD;  Location: [facility];  Service: General;  Laterality: N/A;    POUCHOSCOPY N/A 4/6/2022    Procedure: ENDOSCOPY, POUCH, SMALL INTESTINE, DIAGNOSTIC;  Surgeon: Dieter Juarez MD;  Location: [facility] ENDO (2ND FLR);  Service: Endoscopy;  Laterality: N/A;    REPAIR, HERNIA, PARASTOMAL N/A 11/9/2020    Procedure: REPAIR, HERNIA, PARASTOMAL;  Surgeon: CED Hlaey MD;  Location: [facility] OR 2ND FLR;  Service: Colon and Rectal;  Laterality: N/A;    TRANSURETHRAL RESECTION OF PROSTATE (TURP) WITHOUT USE OF LASER N/A 1/23/2019    Procedure: TURP, WITHOUT USING LASER BIPOLAR;  Surgeon: Catarino Mota MD;  Location: [facility] OR 1ST FLR;  Service: Urology;  Laterality: N/A;  1.5 HOURS    TREATMENT OF CARDIAC ARRHYTHMIA  4/30/2024    Procedure: Cardioversion or Defibrillation;  Surgeon: Franky Taylor MD;  Location: [facility] EP LAB;  Service: Cardiology;;       Social History     Socioeconomic History    Marital status:     Number of children: 1   Occupational History    Occupation:  x 44 years     Comment: Retired    Occupation: [place]    Tobacco Use    Smoking status: Never      Passive exposure: Never    Smokeless tobacco: Never   Substance and Sexual Activity    Alcohol use: No    Drug use: No    Sexual activity: Not Currently     Partners: Female     Social Drivers of Health     Financial Resource Strain: Low Risk  (1/24/2025)    Overall Financial Resource Strain (CARDIA)     Difficulty of Paying Living Expenses: Not very hard   Food Insecurity: No Food Insecurity (1/24/2025)    Hunger Vital Sign     Worried About Running Out of Food in the Last Year: Never true     Ran Out of Food in the Last Year: Never true   Transportation Needs: No Transportation Needs (1/24/2025)    TRANSPORTATION NEEDS     Transportation : No   Physical Activity: Sufficiently Active (10/3/2024)    Exercise Vital Sign     Days of Exercise per Week: 7 days     Minutes of Exercise per Session: 150+ min   Recent Concern: Physical Activity - Inactive (8/29/2024)    Exercise Vital Sign     Days of Exercise per Week: 0 days     Minutes of Exercise per Session: 0 min   Stress: No Stress Concern Present (1/24/2025)    [employer] [place] [employer] - Occupational Stress Questionnaire     Feeling of Stress : Not at all   Housing Stability: Low Risk  (1/24/2025)    Housing Stability Vital Sign     Unable to Pay for Housing in the Last Year: No     Homeless in the Last Year: No       Family History   Problem Relation Name Age of Onset    Dementia Mother      Cancer Father      Diabetes Father      Heart disease Father      Melanoma Neg Hx      Hypertension Neg Hx      Arthritis Neg Hx         Review of Systems: see in HPI    Physical Exam:  Vitals:    02/04/25 0849   BP: (!) 123/52   Pulse: 66   Resp:    Temp:      General: NAD   HEENT: no mouth sores or nasal lesions.  Skin:  - Scalp:    - Face: erythematous scaly gritty papule on Left temple   - Neck: clear  - Chest: clear  - Back: clear, no rash  - Abdomen: clear, no rash  - Genitalia/buttocks: patient deferred and stated no rash   - RUE: forearms with  dermatoheliosis and violaceous patches with overlying erythematous scaly papules in photodistributed pattern. No eczematous scaly papules. Interdigit webspaces are clear  - LUE: forearms with dermatoheliosis and violaceous patches with overlying erythematous scaly papules in photodistributed pattern. No eczematous scaly papules. Interdigit webspaces are clear  - RLE: clear no rash  - LLE:clear no rash      Assessment and Plan:  Pruritus  The patient has pruritus on back forearm and scalp and no primary rash visualized. Pruritus without primary morphology can have numerous etiologies including malignancy, kidney/liver issues. For our patient, he has had numerous ED visits and likely medications received that could also be a cause. He has a history of chronic renal impairment and currently not optimized on gentle skin care regimen. He is also undergoing workup for pancreatic lesion. The papules present on forearms likely actinic keratosis. The patient does not have classic clinical presentation for scabies.  We can evaluate further for chronic itch in the outpatient setting.    Recommendations:   -recommend continuing hydroxyzine 25mg qhs PRN for itching   -Recommend Claritin or zyrtec throughout the day as needed for itching (up to 2-3 times per day)  -Sensitive skin care discussed.  Recommended the patient avoid hot showers, avoid fragrance products.  Recommend gentle cleanser and lotion  -Start CeraVe cream liberally multiple times a day. The patient has at home   -Recommend the patient return to outpatient dermatology for evaluation of the AKs and for a TBSE given history of [facility].  He is amendable. Message sent to schedulers to have the patient scheduled.    Case discussed with dermatology attending, Dr. Dewey. Dermatology will sign off. Please contact us if you have any additional questions.     Mohini Schmidt MD   Dermatology

## 2025-02-04 NOTE — PLAN OF CARE
Inpatient Upgrade Note    Jarrett Charlton Jr. has warranted treatment spanning two or more midnights of hospital level care for the management of heart failure. He continues to require IV diuresis, daily labs, further testing/imaging, monitoring of vital signs, and further evaluation by consultants. His condition is also complicated by the following comorbidities: Hypertension, Diabetes, Chronic kidney disease, Heart failure, and Malignancy.

## 2025-02-04 NOTE — PROGRESS NOTES
[facility] - Emergency Dept  Hospital Medicine  Progress Note    Patient Name: Jarrett Charlton Jr.  MRN: [id number]  Patient Class: IP- Inpatient   Admission Date: 2/3/2025  Length of Stay: 0 days  Attending Physician: Jamar Medrano MD  Primary Care Provider: Tripp Mohan MD        Subjective     Principal Problem:<principal problem not specified>        HPI:  74 y.o. male with a PMH of CAD s/p PCI to mid LAD in 2017, Hx of VT, Atrial tachycardia s/p AT ablation in 4/2024 and DVT on Eliquis, CHF with the EF of 35%, SBO, s/p colostomy, hypertension, hyperlipidemia, and DM2 presenting to the ED for worsening lower extremity swelling and abdominal pain. Patient was recently discharged from [facility] on 1/30 for workup of DVT, found to be superficial vein thrombosis. Hospital course c/b GRADY. Patient also found to have new pancreatic lesion with plans for outpatient ERCP. Patient states since discharge he has had worsening abdominal cramping and bloating. Notes decreased ostomy output. Was initially passing gas but states he has not been able to for the past 3 days. Denies fever, chills, N/V. Patient also reporting worsening BLE edema since discharge. States he was told to stop taking his home lasix due to his GRADY with plans to restart in on Monday, 2/10. Patient also notes 3 weeks of generalized pruritus that has progressively worsened.     Overview/Hospital Course:  No notes on file    Interval History: Low BS this am, started on d5 drip- poor po intake, U/S negative for DVT. AES consult pending. BP is running low but BNP is elevated. Will try fluid bolus and restart home midodrine.    Review of Systems  Objective:     Vital Signs (Most Recent):  Temp: 97.7 °F (36.5 °C) (02/04/25 0434)  Pulse: 66 (02/04/25 0849)  Resp: 16 (02/04/25 0434)  BP: (!) 123/52 (02/04/25 0849)  SpO2: 98 % (02/04/25 0434) Vital Signs (24h Range):  Temp:  [97.6 °F (36.4 °C)-98.3 °F (36.8 °C)] 97.7 °F (36.5 °C)  Pulse:  [59-91] 66  Resp:  [16-22]  16  SpO2:  [98 %-100 %] 98 %  BP: ()/(50-81) 123/52     Weight: 68 kg (150 lb)  Body mass index is 19.26 kg/m².    Intake/Output Summary (Last 24 hours) at 2/4/2025 0933  Last data filed at 2/4/2025 0453  Gross per 24 hour   Intake 240 ml   Output 1300 ml   Net -1060 ml         Physical Exam  Constitutional:       Appearance: Normal appearance.   HENT:      Nose: Nose normal.   Eyes:      Pupils: Pupils are equal, round, and reactive to light.   Cardiovascular:      Rate and Rhythm: Normal rate.   Pulmonary:      Effort: Pulmonary effort is normal.   Abdominal:      General: There is distension.      Tenderness: There is abdominal tenderness.   Neurological:      Mental Status: He is oriented to person, place, and time.             Significant Labs: All pertinent labs within the past 24 hours have been reviewed.  BMP:   Recent Labs   Lab 02/03/25  1033   *   *   K 4.8      CO2 20*   BUN 62*   CREATININE 2.5*   CALCIUM 8.2*       Significant Imaging: I have reviewed all pertinent imaging results/findings within the past 24 hours.    Assessment and Plan     History of DVT (deep vein thrombosis)  C/W elequis  Recheck U/S      CHF (congestive heart failure), NYHA class IV  Patient has Systolic (HFrEF) heart failure that is Acute. On presentation their CHF was decompensated. Evidence of decompensated CHF on presentation includes: edema. The etiology of their decompensation is likely medication non-compliance. Most recent BNP and echo results are listed below.  Recent Labs     02/03/25  1033   BNP 2,750*     Latest ECHO  Results for orders placed during the hospital encounter of 01/24/25    Echo    Interpretation Summary    Left Ventricle: The left ventricle is mildly dilated. Ventricular mass is normal. Normal wall thickness. Severe global hypokinesis present. There is severely reduced systolic function. Ejection fraction is approximately 30%. Grade I diastolic dysfunction.    Right Ventricle:  Normal right ventricular cavity size. Wall thickness is normal. Right ventricle wall motion has global hypokinesis. Systolic function is moderately reduced.    Left Atrium: Left atrium is severely dilated.    Right Atrium: Right atrium is moderately dilated.    Aortic Valve: The aortic valve is a trileaflet valve. There is mild aortic valve sclerosis. There is mild aortic regurgitation with a centrally directed jet.    Mitral Valve: There is severe regurgitation with a centrally directed jet.    Tricuspid Valve: There is moderate to severe regurgitation with a centrally directed jet.    Pulmonary Artery: There is mild to moderate pulmonary hypertension. The estimated pulmonary artery systolic pressure is 42 mmHg.    IVC/SVC: Elevated venous pressure at 15 mmHg.    Pericardium: There is a small circumferential effusion. Left pleural effusion.    Current Heart Failure Medications  metoprolol succinate (TOPROL-XL) 24 hr tablet 25 mg, Daily, Oral  furosemide injection 40 mg, Daily, Intravenous    Plan  - Monitor strict I&Os and daily weights.    - Place on telemetry  - Low sodium diet  - Place on fluid restriction of 1 L.   lasix40 mg daily        Ileostomy in place  Stable      History of ulcerative colitis  Stable      Chronic renal impairment  Creatine stable for now. BMP reviewed- noted Estimated Creatinine Clearance: 24.6 mL/min (A) (based on SCr of 2.5 mg/dL (H)). according to latest data. Based on current GFR, CKD stage is stage 4 - GFR 15-29.  Monitor UOP and serial BMP and adjust therapy as needed. Renally dose meds. Avoid nephrotoxic medications and procedures.      VTE Risk Mitigation (From admission, onward)           Ordered     apixaban tablet 5 mg  2 times daily         02/03/25 142                    Discharge Planning   JOSÉ MIGUEL:      Code Status: Prior   Medical Readiness for Discharge Date:                            Jamar Medrano MD  Department of Hospital Medicine   [facility] - [facility] Dept

## 2025-02-04 NOTE — SUBJECTIVE & OBJECTIVE
Interval History: Low BS this am, started on d5 drip- poor po intake, U/S negative for DVT. AES consult pending.    Review of Systems  Objective:     Vital Signs (Most Recent):  Temp: 97.7 °F (36.5 °C) (02/04/25 0434)  Pulse: 66 (02/04/25 0849)  Resp: 16 (02/04/25 0434)  BP: (!) 123/52 (02/04/25 0849)  SpO2: 98 % (02/04/25 0434) Vital Signs (24h Range):  Temp:  [97.6 °F (36.4 °C)-98.3 °F (36.8 °C)] 97.7 °F (36.5 °C)  Pulse:  [59-91] 66  Resp:  [16-22] 16  SpO2:  [98 %-100 %] 98 %  BP: ()/(50-81) 123/52     Weight: 68 kg (150 lb)  Body mass index is 19.26 kg/m².    Intake/Output Summary (Last 24 hours) at 2/4/2025 0933  Last data filed at 2/4/2025 0453  Gross per 24 hour   Intake 240 ml   Output 1300 ml   Net -1060 ml         Physical Exam  Constitutional:       Appearance: Normal appearance.   HENT:      Nose: Nose normal.   Eyes:      Pupils: Pupils are equal, round, and reactive to light.   Cardiovascular:      Rate and Rhythm: Normal rate.   Pulmonary:      Effort: Pulmonary effort is normal.   Abdominal:      General: There is distension.      Tenderness: There is abdominal tenderness.   Neurological:      Mental Status: He is oriented to person, place, and time.             Significant Labs: All pertinent labs within the past 24 hours have been reviewed.  BMP:   Recent Labs   Lab 02/03/25  1033   *   *   K 4.8      CO2 20*   BUN 62*   CREATININE 2.5*   CALCIUM 8.2*       Significant Imaging: I have reviewed all pertinent imaging results/findings within the past 24 hours.

## 2025-02-04 NOTE — CARE UPDATE
RAPID RESPONSE NURSE PROACTIVE ROUNDING NOTE       Time of Visit: 2:15 PM    Admit Date: 2/3/2025  LOS: 0  Code Status: Prior   Date of Visit: 2025  : 1950  Age: 74 y.o.  Sex: male  Race: White  Bed: YADY/EDOU01:   MRN: [id number]  Was the patient discharged from an ICU this admission? No   Was the patient discharged from a PACU within last 24 hours? No   Did the patient receive conscious sedation/general anesthesia in last 24 hours? No  Was the patient in the ED within the past 24 hours? Yes  Was the patient on NIPPV within the past 24 hours? No   Attending Physician: Jamar Medrano MD  Primary Service: [facility]   Time spent at the bedside: 15 -30 min    SITUATION    Notified by bedside RN via phone call.  Reason for alert: hypotension  Called to evaluate the patient for Circulatory.    BACKGROUND     Why is the patient in the hospital?: <principal problem not specified>    Patient has a past medical history of Anticoagulant long-term use, Basal cell carcinoma, BPH (benign prostatic hyperplasia), Carotid stenosis, Chronic kidney disease, Claudication, DDD (degenerative disc disease), Diabetes mellitus with renal complications, Disc disease, degenerative, cervical, DVT (deep venous thrombosis), Encounter for blood transfusion, GERD (gastroesophageal reflux disease), Gout, chronic, History of ulcerative colitis, HLD (hyperlipidemia), HTN (hypertension), Ileostomy in place, Paroxysmal atrial fibrillation, RBBB, Squamous cell carcinoma, Squamous cell carcinoma, Syncope, and Ventricular tachycardia.    Last Vitals:  Temp: 97.8 °F (36.6 °C) ( 1226)  Pulse: 71 ( 1226)  Resp: 16 ( 1226)  BP: 81/61 ( 1415)  SpO2: 98 % ( 1226)    24 Hours Vitals Range:  Temp:  [97.6 °F (36.4 °C)-98.3 °F (36.8 °C)]   Pulse:  [59-86]   Resp:  [16-18]   BP: ()/(50-71)   SpO2:  [97 %-100 %]     Labs:  Recent Labs     25  1033   WBC 6.15   HGB 11.1*   HCT 37.2*   *       Recent Labs  "    02/03/25  1033 02/04/25  0947   * 139   K 4.8 3.8    102   CO2 20* 23   BUN 62* 57*   CREATININE 2.5* 2.4*   * 95          ASSESSMENT      Physical Exam  Constitutional:       General: He is not in acute distress.     Appearance: He is ill-appearing.   Cardiovascular:      Rate and Rhythm: Normal rate.   Pulmonary:      Effort: Pulmonary effort is normal. No respiratory distress.   Abdominal:      Tenderness: There is no abdominal tenderness.   Genitourinary:     Comments: States "I've been peeing all day."  Musculoskeletal:      Right lower leg: Edema present.      Left lower leg: Edema present.   Skin:     General: Skin is warm.      Coloration: Skin is pale.      Findings: Bruising present.   Neurological:      General: No focal deficit present.      Mental Status: He is oriented to person, place, and time and easily aroused. He is lethargic.      GCS: GCS eye subscore is 3. GCS verbal subscore is 5. GCS motor subscore is 6.   Psychiatric:         Attention and Perception: Attention normal.         Behavior: Behavior is cooperative.     RRT asked to examine pt d/t hypotension. Upon assessment, LR bolus completed. HR 70s, BP 81/61 (MAP 67), RR 18, and SpO2 100% on RA. Physical assessment as above. Pt reports taking 10mg of midodrine TID at home. He denies SOB, dizziness, lightheadedness, nausea, and/or chest pain. He endorses fatigue and states "my eyelids feel like lead." Dr. Medrano updated and notified of most recent vital signs by primary RN. No ICU needs at this time. Rapid Response Team will continue to follow pt. Continuous telemetry monitoring in place.     INTERVENTIONS    The patient was seen for Cardiac problem. Staff concerns included hypotension. The following interventions were performed: portable chest x-ray and continuous cardiac monitoring continued.    RECOMMENDATIONS    We recommend: Continuous telemetry monitoring, Maintain IV access, Strict I/O, Implement fall " precautions, ensure sitter if necessary, Delirium prevention, Monitor for signs of hemodynamic instability, Administer prescribed medications and monitor for effect, and Notify Rapid Response of decline in patient status.    PROVIDER ESCALATION    Yes/No  Yes    Orders received and case discussed with Dr. Medrano .    Disposition: Remain in room EDOU01.    FOLLOW-UP    Charge RNDanette, and bedside RN, Emmy,  updated on plan of care. Instructed to call the Rapid Response Nurse, Laura Gregg RN at [phone] for additional questions or concerns.

## 2025-02-04 NOTE — ED NOTES
Assumed care of the patient. Report received from Rupal FERRELL. Pt on continuous cardiac monitoring, vital signs taken intermittently. Pt in hospital gown, side rails up X2, bed low and locked, and call light is placed within reach. No family/visitors at bedside at this time. Pt denies any complaints or needs.

## 2025-02-04 NOTE — PLAN OF CARE
[facility] - [facility] Dept  Initial Discharge Assessment       Primary Care Provider: Tripp Mohan MD    Admission Diagnosis: Acute on chronic congestive heart failure, unspecified heart failure type [I50.9]    Admission Date: 2/3/2025    Pt is independent with their ADL's and ambulation, does not require assistance.     Post acute was discussed with pt. Pt d/c home with no needs at  the moment. Pt daughter Latisha [phone] to provide transportation home.     Expected Discharge Date:     Transition of Care Barriers: (P) None    Payor: [employer] MEDICARE / Plan: [employer] MEDICARE HMO / Product Type: Capitation /     Extended Emergency Contact Information  Primary Emergency Contact: Latisha Charlton  Home Phone: [phone]  Mobile Phone: [phone]  Relation: Daughter    Discharge Plan A: (P) Home  Discharge Plan B: (P) Home      [facility] - [facility] [address]  [address] [address]  Phone: [phone] Fax: [phone]      Initial Assessment (most recent)       Adult Discharge Assessment - 02/04/25 1106          Discharge Assessment    Assessment Type Discharge Planning Assessment (P)      Confirmed/corrected address, phone number and insurance Yes (P)      Confirmed Demographics Correct on Facesheet (P)      Source of Information patient (P)      Does patient/caregiver understand observation status Yes (P)      Reason For Admission Pancreatic mass (P)      People in Home alone (P)      Do you expect to return to your current living situation? Yes (P)      Do you have help at home or someone to help you manage your care at home? No (P)      Prior to hospitilization cognitive status: Alert/Oriented (P)      Current cognitive status: Alert/Oriented (P)      Walking or Climbing Stairs Difficulty no (P)      Dressing/Bathing Difficulty no (P)      Home Accessibility stairs to enter home (P)      Number of Stairs, Main Entrance five (P)      Stair  Railings, Main Entrance railings safe and in good condition (P)      Home Layout Able to live on 1st floor (P)      Equipment Currently Used at Home none (P)      Readmission within 30 days? Yes (P)      Patient currently being followed by outpatient case management? No (P)      Do you currently have service(s) that help you manage your care at home? No (P)      Do you take prescription medications? Yes (P)      Do you have prescription coverage? Yes (P)      Coverage [employer] MANAGED MEDICARE - [employer] MEDICARE HMO - CAPITATED (P)      Do you have any problems affording any of your prescribed medications? No (P)      Is the patient taking medications as prescribed? yes (P)      Who is going to help you get home at discharge? family/friend (P)      How do you get to doctors appointments? car, drives self (P)      Are you on dialysis? No (P)      Do you take coumadin? No (P)      Discharge Plan A Home (P)      Discharge Plan B Home (P)      DME Needed Upon Discharge  none (P)      Discharge Plan discussed with: Patient (P)      Transition of Care Barriers None (P)         Physical Activity    On average, how many days per week do you engage in moderate to strenuous exercise (like a brisk walk)? 0 days (P)      On average, how many minutes do you engage in exercise at this level? 0 min (P)         Financial Resource Strain    How hard is it for you to pay for the very basics like food, housing, medical care, and heating? Not very hard (P)         Housing Stability    In the last 12 months, was there a time when you were not able to pay the mortgage or rent on time? No (P)      At any time in the past 12 months, were you homeless or living in a shelter (including now)? No (P)         Transportation Needs    Has the lack of transportation kept you from medical appointments, meetings, work or from getting things needed for daily living? No (P)         Food Insecurity    Within the past 12 months, you worried that your food  would run out before you got the money to buy more. Never true (P)      Within the past 12 months, the food you bought just didn't last and you didn't have money to get more. Never true (P)         Stress    Do you feel stress - tense, restless, nervous, or anxious, or unable to sleep at night because your mind is troubled all the time - these days? Only a little (P)         Social Isolation    How often do you feel lonely or isolated from those around you?  Never (P)         Alcohol Use    Q1: How often do you have a drink containing alcohol? Never (P)      Q2: How many drinks containing alcohol do you have on a typical day when you are drinking? Patient does not drink (P)      Q3: How often do you have six or more drinks on one occasion? Never (P)         Utilities    In the past 12 months has the electric, gas, oil, or water company threatened to shut off services in your home? No (P)         Health Literacy    How often do you need to have someone help you when you read instructions, pamphlets, or other written material from your doctor or pharmacy? Rarely (P)                    MCKAY Encarnacion, CSW.   Case Management  [facility]  Email: [email or web address]

## 2025-02-04 NOTE — ED NOTES
Telemetry Verification   Patient placed on Telemetry Box  Verified with War Room  Box # [id number]   Monitor Tech Alexx   Rate NSR   Rhythm 67

## 2025-02-04 NOTE — PROGRESS NOTES
Pharmacist Renal Dose Adjustment Note    Jarrett Charlton Jr. is a 74 y.o. male being treated with the medication famotidine    Patient Data:    Vital Signs (Most Recent):  Temp: 98 °F (36.7 °C) (02/04/25 0849)  Pulse: 66 (02/04/25 0849)  Resp: 18 (02/04/25 0849)  BP: (!) 123/52 (02/04/25 0849)  SpO2: 97 % (02/04/25 0849) Vital Signs (72h Range):  Temp:  [97.6 °F (36.4 °C)-98.3 °F (36.8 °C)]   Pulse:  []   Resp:  [16-22]   BP: ()/(50-81)   SpO2:  [97 %-100 %]      Recent Labs   Lab 01/30/25  0403 02/03/25  1033 02/04/25  0947   CREATININE 3.3* 2.5* 2.4*     Serum creatinine: 2.4 mg/dL (H) 02/04/25 0947  Estimated creatinine clearance: 25.6 mL/min (A)    Famotidine 20 mg oral daily will be changed to famotidine 20 mg every other day.        Pharmacist's Name: Gill Wells  Pharmacist's Extension: [phone]

## 2025-02-04 NOTE — PROGRESS NOTES
[facility] Failure [facility] ([facility]) Team notified of pt referral via Ambulatory Referral to Heart Failure Transitional Care ([id number]).    Patient screened today 2/4/2025 by provider and LPN for enrollment to program.      Pt was deemed not a candidate for enrollment at this time related to patient refused. Pt states he is not fluid overloaded right now. I am unable to go to the pt's room due to scabies infection. The pt says he is not in the hospital for fluid overload.     Pt will require additional follow up planning per primary team.     If pt status, diagnosis, or treatment plan changes , please place AMB referral to Heart Failure Transitional Care Clinic ([id number]) for HFTCC enrollment re-evalution.

## 2025-02-04 NOTE — ED NOTES
Mitchell THAPA notified of cbg 58. D5 0.49% NS to be started. Pt oriented x4. EKG repeated d/t EKG changes, frequent PVCs.  Denies CP or SOB.

## 2025-02-04 NOTE — PLAN OF CARE
Problem: Adult Inpatient Plan of Care  Goal: Plan of Care Review  Outcome: Progressing  Goal: Absence of Hospital-Acquired Illness or Injury  Outcome: Progressing  Goal: Optimal Comfort and Wellbeing  Outcome: Progressing     Problem: Heart Failure  Goal: Optimal Coping  Outcome: Progressing  Goal: Stable Heart Rate and Rhythm  Outcome: Progressing  Goal: Improved Oral Intake  Outcome: Progressing  Goal: Effective Oxygenation and Ventilation  Outcome: Progressing  Goal: Effective Breathing Pattern During Sleep  Outcome: Progressing   POC reviewed with pt who verbalized understanding. AAOx4. VSS on RA. No complaints of pain or nausea. Tele monitored running SR/SB. Voiding per urinal. Resting between care. Remains free of falls and injury. Call light in reach and rounds made for safety. Isolation maintained.

## 2025-02-04 NOTE — EKG INTERPRETATIONS - EMERGENCY DEPT.
Independently interpreted by MD:  Rate 86, NSR, no stemi, PAC, no hypertrophy, RBBB, flipped T waves ant, nonspecific T wave changes inf

## 2025-02-05 ENCOUNTER — TELEPHONE (OUTPATIENT)
Dept: ENDOSCOPY | Facility: HOSPITAL | Age: 75
End: 2025-02-05
Payer: MEDICARE

## 2025-02-05 ENCOUNTER — TELEPHONE (OUTPATIENT)
Dept: DERMATOLOGY | Facility: CLINIC | Age: 75
End: 2025-02-05
Payer: MEDICARE

## 2025-02-05 LAB
ANION GAP SERPL CALC-SCNC: 12 MMOL/L (ref 8–16)
BUN SERPL-MCNC: 51 MG/DL (ref 8–23)
CALCIUM SERPL-MCNC: 8.2 MG/DL (ref 8.7–10.5)
CHLORIDE SERPL-SCNC: 102 MMOL/L (ref 95–110)
CO2 SERPL-SCNC: 25 MMOL/L (ref 23–29)
CREAT SERPL-MCNC: 2.4 MG/DL (ref 0.5–1.4)
EST. GFR  (NO RACE VARIABLE): 27.5 ML/MIN/1.73 M^2
GLUCOSE SERPL-MCNC: 124 MG/DL (ref 70–110)
OHS QRS DURATION: 136 MS
OHS QTC CALCULATION: 502 MS
POCT GLUCOSE: 108 MG/DL (ref 70–110)
POCT GLUCOSE: 137 MG/DL (ref 70–110)
POCT GLUCOSE: 157 MG/DL (ref 70–110)
POCT GLUCOSE: 180 MG/DL (ref 70–110)
POCT GLUCOSE: 195 MG/DL (ref 70–110)
POCT GLUCOSE: 48 MG/DL (ref 70–110)
POCT GLUCOSE: 74 MG/DL (ref 70–110)
POCT GLUCOSE: 88 MG/DL (ref 70–110)
POTASSIUM SERPL-SCNC: 4.1 MMOL/L (ref 3.5–5.1)
SODIUM SERPL-SCNC: 139 MMOL/L (ref 136–145)

## 2025-02-05 PROCEDURE — 25000003 PHARM REV CODE 250: Mod: HCNC | Performed by: INTERNAL MEDICINE

## 2025-02-05 PROCEDURE — 80048 BASIC METABOLIC PNL TOTAL CA: CPT | Mod: HCNC | Performed by: INTERNAL MEDICINE

## 2025-02-05 PROCEDURE — 25000003 PHARM REV CODE 250: Mod: HCNC | Performed by: HOSPITALIST

## 2025-02-05 PROCEDURE — 20600001 HC STEP DOWN PRIVATE ROOM: Mod: HCNC

## 2025-02-05 PROCEDURE — 36415 COLL VENOUS BLD VENIPUNCTURE: CPT | Mod: HCNC | Performed by: INTERNAL MEDICINE

## 2025-02-05 RX ORDER — FUROSEMIDE 40 MG/1
40 TABLET ORAL DAILY
Status: DISCONTINUED | OUTPATIENT
Start: 2025-02-05 | End: 2025-02-06

## 2025-02-05 RX ADMIN — AMIODARONE HYDROCHLORIDE 200 MG: 200 TABLET ORAL at 09:02

## 2025-02-05 RX ADMIN — MIDODRINE HYDROCHLORIDE 10 MG: 5 TABLET ORAL at 09:02

## 2025-02-05 RX ADMIN — HYDROXYZINE HYDROCHLORIDE 25 MG: 25 TABLET, FILM COATED ORAL at 09:02

## 2025-02-05 RX ADMIN — HYDROXYZINE HYDROCHLORIDE 25 MG: 25 TABLET, FILM COATED ORAL at 11:02

## 2025-02-05 RX ADMIN — MIDODRINE HYDROCHLORIDE 10 MG: 5 TABLET ORAL at 11:02

## 2025-02-05 RX ADMIN — ASPIRIN 81 MG: 81 TABLET, COATED ORAL at 09:02

## 2025-02-05 RX ADMIN — MIDODRINE HYDROCHLORIDE 10 MG: 5 TABLET ORAL at 06:02

## 2025-02-05 RX ADMIN — APIXABAN 5 MG: 5 TABLET, FILM COATED ORAL at 09:02

## 2025-02-05 RX ADMIN — SODIUM BICARBONATE 650 MG TABLET 1300 MG: at 09:02

## 2025-02-05 RX ADMIN — FUROSEMIDE 40 MG: 40 TABLET ORAL at 03:02

## 2025-02-05 RX ADMIN — ALLOPURINOL 400 MG: 100 TABLET ORAL at 09:02

## 2025-02-05 RX ADMIN — Medication 24 G: at 05:02

## 2025-02-05 RX ADMIN — BRIMONIDINE TARTRATE 1 DROP: 2 SOLUTION OPHTHALMIC at 10:02

## 2025-02-05 RX ADMIN — BRIMONIDINE TARTRATE 1 DROP: 2 SOLUTION OPHTHALMIC at 09:02

## 2025-02-05 RX ADMIN — METOPROLOL SUCCINATE 25 MG: 25 TABLET, EXTENDED RELEASE ORAL at 09:02

## 2025-02-05 RX ADMIN — TAMSULOSIN HYDROCHLORIDE 0.4 MG: 0.4 CAPSULE ORAL at 09:02

## 2025-02-05 NOTE — NURSING
New admit  from ED arrived in stable condition approximately @2130 AAOx4 denies any pain or distress,  has c/o itching, notably dry scabbing skin to BUE/BLE, admin hydroxyzine 25mg, effective relief. VSS, resp even and unlabored. Ileostomy intact. FABIANG WNL, Tele box #[id number] intact with continuous pulse ox. Patient slept well, b/ps low after midnight see flowsheet, patient asymptomatic. Care plan on going.

## 2025-02-05 NOTE — PROGRESS NOTES
[facility] - [facility] Medicine  Progress Note    Patient Name: Jarrett Charlton Jr.  MRN: [id number]  Patient Class: IP- Inpatient   Admission Date: 2/3/2025  Length of Stay: 1 days  Attending Physician: Jamar Medrano MD  Primary Care Provider: Tripp Mohan MD        Subjective     Principal Problem:<principal problem not specified>        HPI:  74 y.o. male with a PMH of CAD s/p PCI to mid LAD in 2017, Hx of VT, Atrial tachycardia s/p AT ablation in 4/2024 and DVT on Eliquis, CHF with the EF of 35%, SBO, s/p colostomy, hypertension, hyperlipidemia, and DM2 presenting to the ED for worsening lower extremity swelling and abdominal pain. Patient was recently discharged from [facility] on 1/30 for workup of DVT, found to be superficial vein thrombosis. Hospital course c/b GRADY. Patient also found to have new pancreatic lesion with plans for outpatient ERCP. Patient states since discharge he has had worsening abdominal cramping and bloating. Notes decreased ostomy output. Was initially passing gas but states he has not been able to for the past 3 days. Denies fever, chills, N/V. Patient also reporting worsening BLE edema since discharge. States he was told to stop taking his home lasix due to his GRADY with plans to restart in on Monday, 2/10. Patient also notes 3 weeks of generalized pruritus that has progressively worsened.     Overview/Hospital Course:  No notes on file    Interval History: Looks better, BP is stable once midodrine increased to 10 mg TID, will restart home lasix dose.will contact [facility] concerning ERCP.    Review of Systems  Objective:     Vital Signs (Most Recent):  Temp: 97.6 °F (36.4 °C) (02/05/25 0720)  Pulse: 75 (02/05/25 0720)  Resp: 16 (02/05/25 0720)  BP: 99/63 (02/05/25 0720)  SpO2: 98 % (02/05/25 0720) Vital Signs (24h Range):  Temp:  [97.6 °F (36.4 °C)-98.2 °F (36.8 °C)] 97.6 °F (36.4 °C)  Pulse:  [62-78] 75  Resp:  [16-18] 16  SpO2:  [96 %-100 %] 98 %  BP: ()/(50-71)  99/63     Weight: 68 kg (150 lb)  Body mass index is 19.26 kg/m².    Intake/Output Summary (Last 24 hours) at 2/5/2025 0846  Last data filed at 2/4/2025 1433  Gross per 24 hour   Intake 661.25 ml   Output --   Net 661.25 ml         Physical Exam  Constitutional:       Appearance: Normal appearance.   HENT:      Nose: Nose normal.   Eyes:      Pupils: Pupils are equal, round, and reactive to light.   Cardiovascular:      Rate and Rhythm: Normal rate.   Pulmonary:      Effort: Pulmonary effort is normal.   Abdominal:      General: There is distension.      Tenderness: There is abdominal tenderness.   Neurological:      Mental Status: He is oriented to person, place, and time.             Significant Labs: All pertinent labs within the past 24 hours have been reviewed.  BMP:   Recent Labs   Lab 02/04/25  0947   GLU 95      K 3.8      CO2 23   BUN 57*   CREATININE 2.4*   CALCIUM 8.3*       Significant Imaging: I have reviewed all pertinent imaging results/findings within the past 24 hours.    Assessment and Plan     History of DVT (deep vein thrombosis)  C/W elequis  Recheck U/S      CHF (congestive heart failure), NYHA class IV  Patient has Systolic (HFrEF) heart failure that is Acute. On presentation their CHF was decompensated. Evidence of decompensated CHF on presentation includes: edema. The etiology of their decompensation is likely medication non-compliance. Most recent BNP and echo results are listed below.  Recent Labs     02/03/25  1033   BNP 2,750*     Latest ECHO  Results for orders placed during the hospital encounter of 01/24/25    Echo    Interpretation Summary    Left Ventricle: The left ventricle is mildly dilated. Ventricular mass is normal. Normal wall thickness. Severe global hypokinesis present. There is severely reduced systolic function. Ejection fraction is approximately 30%. Grade I diastolic dysfunction.    Right Ventricle: Normal right ventricular cavity size. Wall thickness is  normal. Right ventricle wall motion has global hypokinesis. Systolic function is moderately reduced.    Left Atrium: Left atrium is severely dilated.    Right Atrium: Right atrium is moderately dilated.    Aortic Valve: The aortic valve is a trileaflet valve. There is mild aortic valve sclerosis. There is mild aortic regurgitation with a centrally directed jet.    Mitral Valve: There is severe regurgitation with a centrally directed jet.    Tricuspid Valve: There is moderate to severe regurgitation with a centrally directed jet.    Pulmonary Artery: There is mild to moderate pulmonary hypertension. The estimated pulmonary artery systolic pressure is 42 mmHg.    IVC/SVC: Elevated venous pressure at 15 mmHg.    Pericardium: There is a small circumferential effusion. Left pleural effusion.    Current Heart Failure Medications  metoprolol succinate (TOPROL-XL) 24 hr tablet 25 mg, Daily, Oral  furosemide injection 40 mg, Daily, Intravenous    Plan  - Monitor strict I&Os and daily weights.    - Place on telemetry  - Low sodium diet  - Place on fluid restriction of 1 L.   lasix40 mg daily        Ileostomy in place  Stable      History of ulcerative colitis  Stable      Chronic renal impairment  Creatine stable for now. BMP reviewed- noted Estimated Creatinine Clearance: 24.6 mL/min (A) (based on SCr of 2.5 mg/dL (H)). according to latest data. Based on current GFR, CKD stage is stage 4 - GFR 15-29.  Monitor UOP and serial BMP and adjust therapy as needed. Renally dose meds. Avoid nephrotoxic medications and procedures.      VTE Risk Mitigation (From admission, onward)           Ordered     apixaban tablet 5 mg  2 times daily         02/03/25 142                    Discharge Planning   JOSÉ MIGUEL:      Code Status: Prior   Medical Readiness for Discharge Date:   Discharge Plan A: Home                        Jamar Medrano MD  Department of Hospital Medicine   [facility] - Acute Medical Stepdown

## 2025-02-05 NOTE — SUBJECTIVE & OBJECTIVE
Interval History: Looks better, BP is stable, will contact [facility] concerning ERCP.    Review of Systems  Objective:     Vital Signs (Most Recent):  Temp: 97.6 °F (36.4 °C) (02/05/25 0720)  Pulse: 75 (02/05/25 0720)  Resp: 16 (02/05/25 0720)  BP: 99/63 (02/05/25 0720)  SpO2: 98 % (02/05/25 0720) Vital Signs (24h Range):  Temp:  [97.6 °F (36.4 °C)-98.2 °F (36.8 °C)] 97.6 °F (36.4 °C)  Pulse:  [62-78] 75  Resp:  [16-18] 16  SpO2:  [96 %-100 %] 98 %  BP: ()/(50-71) 99/63     Weight: 68 kg (150 lb)  Body mass index is 19.26 kg/m².    Intake/Output Summary (Last 24 hours) at 2/5/2025 0846  Last data filed at 2/4/2025 1433  Gross per 24 hour   Intake 661.25 ml   Output --   Net 661.25 ml         Physical Exam  Constitutional:       Appearance: Normal appearance.   HENT:      Nose: Nose normal.   Eyes:      Pupils: Pupils are equal, round, and reactive to light.   Cardiovascular:      Rate and Rhythm: Normal rate.   Pulmonary:      Effort: Pulmonary effort is normal.   Abdominal:      General: There is distension.      Tenderness: There is abdominal tenderness.   Neurological:      Mental Status: He is oriented to person, place, and time.             Significant Labs: All pertinent labs within the past 24 hours have been reviewed.  BMP:   Recent Labs   Lab 02/04/25  0947   GLU 95      K 3.8      CO2 23   BUN 57*   CREATININE 2.4*   CALCIUM 8.3*       Significant Imaging: I have reviewed all pertinent imaging results/findings within the past 24 hours.

## 2025-02-05 NOTE — TELEPHONE ENCOUNTER
----- Message from Mohini Schmidt sent at 2/4/2025 12:54 PM CST -----  Regarding: schedule  Hi,     Can we please get this patient scheduled for follow-up into any derm staff for routine skin check. The patient previously followed with Dr. Koch. Thanks!

## 2025-02-05 NOTE — HOSPITAL COURSE
Jarrett Charlton Jr. Was admitted to  with abdominal cramping and decreased ostomy output. Imaging negative for obstruction. Decreased output persists, but pt tolerating full diet without issue. BP remains intermittently soft, but improved once midodrine increased to home dose of 10 mg TID. Volume overload on exam, home lasix dose resumed for now. Caution diuresis with hypotension. [facility] consulted concerning ERCP --> recommend against ERCP, will follow up in clinic for further imaging. Derm consulted for scabies concern, determined dermatitis and gave recs for pruritus, which is helping significantly. No evidence of DVT on repeat U/S. Hypotensive, given albumin X 2 with improvement. Continue midodrine. Persistent hypotension despite albumin infusions.

## 2025-02-06 ENCOUNTER — TELEPHONE (OUTPATIENT)
Dept: GASTROENTEROLOGY | Facility: CLINIC | Age: 75
End: 2025-02-06
Payer: MEDICARE

## 2025-02-06 PROBLEM — J98.11 ATELECTASIS: Status: ACTIVE | Noted: 2025-02-06

## 2025-02-06 LAB
ALBUMIN SERPL BCP-MCNC: 2.6 G/DL (ref 3.5–5.2)
ALP SERPL-CCNC: 137 U/L (ref 40–150)
ALT SERPL W/O P-5'-P-CCNC: 21 U/L (ref 10–44)
ANION GAP SERPL CALC-SCNC: 9 MMOL/L (ref 8–16)
AST SERPL-CCNC: 16 U/L (ref 10–40)
BASOPHILS # BLD AUTO: 0.01 K/UL (ref 0–0.2)
BASOPHILS NFR BLD: 0.2 % (ref 0–1.9)
BILIRUB SERPL-MCNC: 0.9 MG/DL (ref 0.1–1)
BUN SERPL-MCNC: 54 MG/DL (ref 8–23)
CALCIUM SERPL-MCNC: 8.3 MG/DL (ref 8.7–10.5)
CHLORIDE SERPL-SCNC: 106 MMOL/L (ref 95–110)
CO2 SERPL-SCNC: 25 MMOL/L (ref 23–29)
CREAT SERPL-MCNC: 2.3 MG/DL (ref 0.5–1.4)
DIFFERENTIAL METHOD BLD: ABNORMAL
EOSINOPHIL # BLD AUTO: 0.1 K/UL (ref 0–0.5)
EOSINOPHIL NFR BLD: 1.7 % (ref 0–8)
ERYTHROCYTE [DISTWIDTH] IN BLOOD BY AUTOMATED COUNT: 19.8 % (ref 11.5–14.5)
EST. GFR  (NO RACE VARIABLE): 28.9 ML/MIN/1.73 M^2
GLUCOSE SERPL-MCNC: 94 MG/DL (ref 70–110)
HCT VFR BLD AUTO: 34.3 % (ref 40–54)
HGB BLD-MCNC: 10.1 G/DL (ref 14–18)
IMM GRANULOCYTES # BLD AUTO: 0.02 K/UL (ref 0–0.04)
IMM GRANULOCYTES NFR BLD AUTO: 0.3 % (ref 0–0.5)
LYMPHOCYTES # BLD AUTO: 1 K/UL (ref 1–4.8)
LYMPHOCYTES NFR BLD: 16.8 % (ref 18–48)
MCH RBC QN AUTO: 27.1 PG (ref 27–31)
MCHC RBC AUTO-ENTMCNC: 29.4 G/DL (ref 32–36)
MCV RBC AUTO: 92 FL (ref 82–98)
MONOCYTES # BLD AUTO: 0.4 K/UL (ref 0.3–1)
MONOCYTES NFR BLD: 7.6 % (ref 4–15)
NEUTROPHILS # BLD AUTO: 4.2 K/UL (ref 1.8–7.7)
NEUTROPHILS NFR BLD: 73.4 % (ref 38–73)
NRBC BLD-RTO: 0 /100 WBC
PLATELET # BLD AUTO: 143 K/UL (ref 150–450)
PMV BLD AUTO: 11.3 FL (ref 9.2–12.9)
POCT GLUCOSE: 100 MG/DL (ref 70–110)
POCT GLUCOSE: 167 MG/DL (ref 70–110)
POCT GLUCOSE: 187 MG/DL (ref 70–110)
POCT GLUCOSE: 208 MG/DL (ref 70–110)
POCT GLUCOSE: 85 MG/DL (ref 70–110)
POCT GLUCOSE: 95 MG/DL (ref 70–110)
POTASSIUM SERPL-SCNC: 4.1 MMOL/L (ref 3.5–5.1)
PROT SERPL-MCNC: 5.5 G/DL (ref 6–8.4)
RBC # BLD AUTO: 3.73 M/UL (ref 4.6–6.2)
SODIUM SERPL-SCNC: 140 MMOL/L (ref 136–145)
WBC # BLD AUTO: 5.76 K/UL (ref 3.9–12.7)

## 2025-02-06 PROCEDURE — 20600001 HC STEP DOWN PRIVATE ROOM: Mod: HCNC

## 2025-02-06 PROCEDURE — 25000003 PHARM REV CODE 250: Mod: HCNC | Performed by: INTERNAL MEDICINE

## 2025-02-06 PROCEDURE — 85025 COMPLETE CBC W/AUTO DIFF WBC: CPT | Mod: HCNC | Performed by: STUDENT IN AN ORGANIZED HEALTH CARE EDUCATION/TRAINING PROGRAM

## 2025-02-06 PROCEDURE — 63600175 PHARM REV CODE 636 W HCPCS: Mod: HCNC | Performed by: INTERNAL MEDICINE

## 2025-02-06 PROCEDURE — 25000003 PHARM REV CODE 250: Mod: HCNC

## 2025-02-06 PROCEDURE — 80053 COMPREHEN METABOLIC PANEL: CPT | Mod: HCNC | Performed by: STUDENT IN AN ORGANIZED HEALTH CARE EDUCATION/TRAINING PROGRAM

## 2025-02-06 PROCEDURE — 36415 COLL VENOUS BLD VENIPUNCTURE: CPT | Mod: HCNC | Performed by: STUDENT IN AN ORGANIZED HEALTH CARE EDUCATION/TRAINING PROGRAM

## 2025-02-06 PROCEDURE — 25000003 PHARM REV CODE 250: Mod: HCNC | Performed by: HOSPITALIST

## 2025-02-06 PROCEDURE — 25000003 PHARM REV CODE 250: Mod: HCNC | Performed by: STUDENT IN AN ORGANIZED HEALTH CARE EDUCATION/TRAINING PROGRAM

## 2025-02-06 RX ORDER — GABAPENTIN 100 MG/1
200 CAPSULE ORAL ONCE
Status: COMPLETED | OUTPATIENT
Start: 2025-02-06 | End: 2025-02-06

## 2025-02-06 RX ORDER — FUROSEMIDE 40 MG/1
40 TABLET ORAL DAILY
Status: DISCONTINUED | OUTPATIENT
Start: 2025-02-06 | End: 2025-02-08

## 2025-02-06 RX ORDER — ACETAMINOPHEN 500 MG
1000 TABLET ORAL ONCE
Status: COMPLETED | OUTPATIENT
Start: 2025-02-06 | End: 2025-02-06

## 2025-02-06 RX ORDER — TAMSULOSIN HYDROCHLORIDE 0.4 MG/1
0.4 CAPSULE ORAL DAILY
Status: DISCONTINUED | OUTPATIENT
Start: 2025-02-06 | End: 2025-02-12 | Stop reason: HOSPADM

## 2025-02-06 RX ORDER — METOPROLOL SUCCINATE 25 MG/1
25 TABLET, EXTENDED RELEASE ORAL DAILY
Status: DISCONTINUED | OUTPATIENT
Start: 2025-02-06 | End: 2025-02-08

## 2025-02-06 RX ADMIN — SODIUM BICARBONATE 650 MG TABLET 1300 MG: at 08:02

## 2025-02-06 RX ADMIN — ALLOPURINOL 400 MG: 100 TABLET ORAL at 08:02

## 2025-02-06 RX ADMIN — AMIODARONE HYDROCHLORIDE 200 MG: 200 TABLET ORAL at 09:02

## 2025-02-06 RX ADMIN — APIXABAN 5 MG: 5 TABLET, FILM COATED ORAL at 08:02

## 2025-02-06 RX ADMIN — ASPIRIN 81 MG: 81 TABLET, COATED ORAL at 08:02

## 2025-02-06 RX ADMIN — MIDODRINE HYDROCHLORIDE 10 MG: 5 TABLET ORAL at 07:02

## 2025-02-06 RX ADMIN — TAMSULOSIN HYDROCHLORIDE 0.4 MG: 0.4 CAPSULE ORAL at 09:02

## 2025-02-06 RX ADMIN — GABAPENTIN 200 MG: 100 CAPSULE ORAL at 09:02

## 2025-02-06 RX ADMIN — FAMOTIDINE 20 MG: 20 TABLET ORAL at 08:02

## 2025-02-06 RX ADMIN — INSULIN ASPART 1 UNITS: 100 INJECTION, SOLUTION INTRAVENOUS; SUBCUTANEOUS at 09:02

## 2025-02-06 RX ADMIN — ACETAMINOPHEN 1000 MG: 500 TABLET ORAL at 09:02

## 2025-02-06 RX ADMIN — SODIUM BICARBONATE 650 MG TABLET 1300 MG: at 09:02

## 2025-02-06 RX ADMIN — COLCHICINE 0.6 MG: 0.6 TABLET ORAL at 08:02

## 2025-02-06 RX ADMIN — AMIODARONE HYDROCHLORIDE 200 MG: 200 TABLET ORAL at 08:02

## 2025-02-06 RX ADMIN — MIDODRINE HYDROCHLORIDE 10 MG: 5 TABLET ORAL at 11:02

## 2025-02-06 RX ADMIN — APIXABAN 5 MG: 5 TABLET, FILM COATED ORAL at 09:02

## 2025-02-06 RX ADMIN — MIDODRINE HYDROCHLORIDE 10 MG: 5 TABLET ORAL at 04:02

## 2025-02-06 RX ADMIN — BRIMONIDINE TARTRATE 1 DROP: 2 SOLUTION OPHTHALMIC at 09:02

## 2025-02-06 RX ADMIN — BRIMONIDINE TARTRATE 1 DROP: 2 SOLUTION OPHTHALMIC at 08:02

## 2025-02-06 RX ADMIN — FUROSEMIDE 40 MG: 40 TABLET ORAL at 09:02

## 2025-02-06 RX ADMIN — METOPROLOL SUCCINATE 25 MG: 25 TABLET, EXTENDED RELEASE ORAL at 09:02

## 2025-02-06 RX ADMIN — HYDROXYZINE HYDROCHLORIDE 25 MG: 25 TABLET, FILM COATED ORAL at 04:02

## 2025-02-06 NOTE — NURSING
Patient in stable condition AAOx4, ambulates to restroom with standby assist. Skin warm dry resp even labored, b/p remains low in the 90s/60s HR in 70s see flowsheet. CBG assessed @0320 was 85 which is significantly lower than accucheck @ 2121 of  180. On 2/5/25 @ 0539 patient CBG 48 patient stated he drops at night. Admin two 120ml of orange juice for CBG of 85 due to night before low CBG. Care plan on going.

## 2025-02-06 NOTE — PLAN OF CARE
Patient aao x's 4. Ambulates independently to bathroom. B/p soft at the beginning of the shift patient was asymptomatic, NAY Lujan gave orders to give midodrine and recheck, bp increased within one hr and toporlol xl tamulosin and furosemide resumed, currently bp 93/62. Safety precautions maintained bed in lowest position call light in reach and plan of care ongoing.                  Problem: Adult Inpatient Plan of Care  Goal: Plan of Care Review  Outcome: Progressing  Goal: Patient-Specific Goal (Individualized)  Outcome: Progressing  Goal: Absence of Hospital-Acquired Illness or Injury  Outcome: Progressing  Goal: Optimal Comfort and Wellbeing  Outcome: Progressing  Goal: Readiness for Transition of Care  Outcome: Progressing     Problem: Heart Failure  Goal: Optimal Coping  Outcome: Progressing  Goal: Optimal Cardiac Output  Outcome: Progressing  Goal: Stable Heart Rate and Rhythm  Outcome: Progressing  Goal: Optimal Functional Ability  Outcome: Progressing  Goal: Fluid and Electrolyte Balance  Outcome: Progressing  Goal: Improved Oral Intake  Outcome: Progressing  Goal: Effective Oxygenation and Ventilation  Outcome: Progressing  Goal: Effective Breathing Pattern During Sleep  Outcome: Progressing     Problem: Diabetes Comorbidity  Goal: Blood Glucose Level Within Targeted Range  Outcome: Progressing     Problem: Acute Kidney Injury/Impairment  Goal: Fluid and Electrolyte Balance  Outcome: Progressing  Goal: Improved Oral Intake  Outcome: Progressing  Goal: Effective Renal Function  Outcome: Progressing     Problem: Wound  Goal: Optimal Coping  Outcome: Progressing  Goal: Optimal Functional Ability  Outcome: Progressing  Goal: Absence of Infection Signs and Symptoms  Outcome: Progressing  Goal: Improved Oral Intake  Outcome: Progressing  Goal: Optimal Pain Control and Function  Outcome: Progressing  Goal: Skin Health and Integrity  Outcome: Progressing  Goal: Optimal Wound Healing  Outcome: Progressing      Problem: Fall Injury Risk  Goal: Absence of Fall and Fall-Related Injury  Outcome: Progressing     Problem: Skin Injury Risk Increased  Goal: Skin Health and Integrity  Outcome: Progressing

## 2025-02-06 NOTE — PLAN OF CARE
Problem: Adult Inpatient Plan of Care  Goal: Plan of Care Review  Outcome: Progressing  Goal: Patient-Specific Goal (Individualized)  Outcome: Progressing  Goal: Absence of Hospital-Acquired Illness or Injury  Outcome: Progressing  Goal: Optimal Comfort and Wellbeing  Outcome: Progressing  Goal: Readiness for Transition of Care  Outcome: Progressing     Problem: Heart Failure  Goal: Optimal Coping  Outcome: Progressing  Goal: Optimal Cardiac Output  Outcome: Progressing  Goal: Stable Heart Rate and Rhythm  Outcome: Progressing  Goal: Optimal Functional Ability  Outcome: Progressing  Goal: Fluid and Electrolyte Balance  Outcome: Progressing  Goal: Improved Oral Intake  Outcome: Progressing  Goal: Effective Oxygenation and Ventilation  Outcome: Progressing  Goal: Effective Breathing Pattern During Sleep  Outcome: Progressing     Problem: Diabetes Comorbidity  Goal: Blood Glucose Level Within Targeted Range  Outcome: Progressing     Problem: Acute Kidney Injury/Impairment  Goal: Fluid and Electrolyte Balance  Outcome: Progressing  Goal: Improved Oral Intake  Outcome: Progressing  Goal: Effective Renal Function  Outcome: Progressing     Problem: Wound  Goal: Optimal Coping  Outcome: Progressing  Goal: Optimal Functional Ability  Outcome: Progressing  Goal: Absence of Infection Signs and Symptoms  Outcome: Progressing  Goal: Improved Oral Intake  Outcome: Progressing  Goal: Optimal Pain Control and Function  Outcome: Progressing  Goal: Skin Health and Integrity  Outcome: Progressing  Goal: Optimal Wound Healing  Outcome: Progressing     Problem: Fall Injury Risk  Goal: Absence of Fall and Fall-Related Injury  Outcome: Progressing

## 2025-02-06 NOTE — ASSESSMENT & PLAN NOTE
C/W elequis  Recheck U/S - negative for DVT. Prior known L greater saphenous vein nonocclusive thrombus resolved.

## 2025-02-06 NOTE — ASSESSMENT & PLAN NOTE
"CT abd w contrast reviewed: "There is a subtle low attenuating lesion at the pancreatic head measuring 1.1 cm, stable noting fatty atrophy of the pancreas without pancreatic ductal dilation."  - [facility] consulted, recommend against ERCP at this time. Plan for follow up in clinic with imaging.   - referral to [facility] at [place]    "

## 2025-02-06 NOTE — PLAN OF CARE
[facility] - Acute Medical Stepdown  Discharge Reassessment    Primary Care Provider: Tripp Mohan MD    Expected Discharge Date: 2/8/2025    Reassessment (most recent)       Discharge Reassessment - 02/06/25 1627          Discharge Reassessment    Assessment Type Discharge Planning Reassessment     Did the patient's condition or plan change since previous assessment? Yes     Discharge Plan discussed with: Patient     Communicated JOSÉ MIGUEL with patient/caregiver Yes     Discharge Plan A Home Health     Discharge Plan B Home     DME Needed Upon Discharge  other (see comments)     Transition of Care Barriers None     Why the patient remains in the hospital Requires continued medical care        Post-Acute Status    Post-Acute Authorization Home Health     Home Health Status Referrals Sent     Coverage [employer] MANA[employer]ED MEDICARE - [employer] MED[employer] HMO - CAPITATED     Hospital Resources/Appts/Education Provided Appointments scheduled and added to AVS     Discharge Delays None known at this time                       Discharge Plan A and Plan B have been determined by review of patient's clinical status, future medical and therapeutic needs, and coverage/benefits for post-acute care in coordination with multidisciplinary team members.    Mercedes Camejo MSW, CSW

## 2025-02-06 NOTE — PROGRESS NOTES
Leonardo Hastings is a 34year old male. Patient presents with:   Follow - Up: regarding throat pain, no change in symptoms, pt is currently on antibiotics       HISTORY OF PRESENT ILLNESS    He presents with a history of chronic throat pain which he sta Lukasz [facility] - [facility] Medicine  Progress Note    Patient Name: Jarrett Charlton Jr.  MRN: [id number]  Patient Class: IP- Inpatient   Admission Date: 2/3/2025  Length of Stay: 2 days  Attending Physician: Jamie Luu MD  Primary Care Provider: Tripp Mohan MD        Subjective     Principal Problem:Acute on chronic congestive heart failure        HPI:  74 y.o. male with a PMH of CAD s/p PCI to mid LAD in 2017, Hx of VT, Atrial tachycardia s/p AT ablation in 4/2024 and DVT on Eliquis, CHF with the EF of 35%, SBO, s/p colostomy, hypertension, hyperlipidemia, and DM2 presenting to the ED for worsening lower extremity swelling and abdominal pain. Patient was recently discharged from [facility] on 1/30 for workup of DVT, found to be superficial vein thrombosis. Hospital course c/b GRADY. Patient also found to have new pancreatic lesion with plans for outpatient ERCP. Patient states since discharge he has had worsening abdominal cramping and bloating. Notes decreased ostomy output. Was initially passing gas but states he has not been able to for the past 3 days. Denies fever, chills, N/V. Patient also reporting worsening BLE edema since discharge. States he was told to stop taking his home lasix due to his GRADY with plans to restart in on Monday, 2/10. Patient also notes 3 weeks of generalized pruritus that has progressively worsened.     Overview/Hospital Course:  Jarrett Charlton Jr. Was admitted to  with abdominal cramping and decreased ostomy output. Imaging negative for obstruction. Decreased output persists, but pt tolerating full diet without issue. BP remains intermittently soft, but improved once midodrine increased to home dose of 10 mg TID. Volume overload on exam, home lasix dose resumed for now. Caution diuresis with hypotension. [facility] consulted concerning ERCP --> recommend against ERCP, will follow up in clinic for further imaging. Derm consulted for scabies concern, determined dermatitis and  Respiratory Negative Dyspnea and wheezing. Cardio Negative Chest pain, irregular heartbeat/palpitations and syncope. GI Negative Abdominal pain and diarrhea. Endocrine Negative Cold intolerance and heat intolerance. Neuro Negative Tremors.    Psyc gave recs for pruritus, which is helping significantly. No evidence of DVT on repeat U/S.     Interval History: Hypotensive episode early this morning, improved with midodrine and now stable. Continue home lasix 40 mg for now. Still minimal ostomy output and pt denies passing flatus, however CT without obstruction and pt tolerating full diet without issue. CTM for now. Pruritus significantly improved with atarax prn.    Review of Systems   Constitutional:  Negative for chills and fever.   Respiratory:  Positive for shortness of breath (exertional). Negative for chest tightness.    Cardiovascular:  Negative for chest pain and leg swelling.        BLE swelling   Gastrointestinal:  Positive for abdominal pain (cramping). Negative for nausea.        Decreased ostomy output   Not passing flatus   Neurological:  Negative for dizziness, weakness and light-headedness.     Objective:     Vital Signs (Most Recent):  Temp: 97.7 °F (36.5 °C) (02/06/25 1309)  Pulse: 80 (02/06/25 1309)  Resp: 16 (02/06/25 1309)  BP: 101/65 (02/06/25 1309)  SpO2: 100 % (02/06/25 1309) Vital Signs (24h Range):  Temp:  [95.8 °F (35.4 °C)-98.5 °F (36.9 °C)] 97.7 °F (36.5 °C)  Pulse:  [58-88] 80  Resp:  [16-20] 16  SpO2:  [98 %-100 %] 100 %  BP: ()/(54-68) 101/65     Weight: 68 kg (150 lb)  Body mass index is 19.26 kg/m².    Intake/Output Summary (Last 24 hours) at 2/6/2025 1336  Last data filed at 2/6/2025 1140  Gross per 24 hour   Intake 270 ml   Output --   Net 270 ml         Physical Exam  Constitutional:       Appearance: Normal appearance.   HENT:      Nose: Nose normal.   Eyes:      Pupils: Pupils are equal, round, and reactive to light.   Cardiovascular:      Rate and Rhythm: Normal rate.      Comments: Mild JVD  Pulmonary:      Effort: Pulmonary effort is normal.      Breath sounds: Rales (BLL) present.   Abdominal:      General: There is distension.      Tenderness: There is abdominal tenderness.      Comments: Ostomy with liquid  "output, no stool noted    Musculoskeletal:      Right lower leg: Edema (2+) present.      Left lower leg: Edema (2+) present.   Skin:     General: Skin is warm and dry.   Neurological:      Mental Status: He is oriented to person, place, and time.             Significant Labs: All pertinent labs within the past 24 hours have been reviewed.  CBC:   Recent Labs   Lab 02/06/25  0650   WBC 5.76   HGB 10.1*   HCT 34.3*   *     CMP:   Recent Labs   Lab 02/05/25  1403 02/06/25  0650    140   K 4.1 4.1    106   CO2 25 25   * 94   BUN 51* 54*   CREATININE 2.4* 2.3*   CALCIUM 8.2* 8.3*   PROT  --  5.5*   ALBUMIN  --  2.6*   BILITOT  --  0.9   ALKPHOS  --  137   AST  --  16   ALT  --  21   ANIONGAP 12 9     Cardiac Markers: No results for input(s): "CKMB", "MYOGLOBIN", "BNP", "TROPISTAT" in the last 48 hours.    Significant Imaging: I have reviewed all pertinent imaging results/findings within the past 24 hours.    Imaging Results              X-Ray Chest 1 View (Final result)  Result time 02/04/25 14:51:09      Final result by John Paul Rodriguez MD (02/04/25 14:51:09)                   Impression:      See above      Electronically signed by: John Paul Rodriguez MD  Date:    02/04/2025  Time:    14:51               Narrative:    EXAMINATION:  XR CHEST 1 VIEW    CLINICAL HISTORY:  concern for vol overload;    TECHNIQUE:  Single frontal view of the chest was performed.    COMPARISON:  01/23/2025    FINDINGS:  Cardiac size is enlarged.  Interstitial markings of the lungs are somewhat increased in this could indicate some cardiac failure.  Mild blunting the left costophrenic angle suggest pleural effusion.  Skin fold overlies the right lower in the thorax.                                       US Lower Extremity Veins Bilateral (Final result)  Result time 02/03/25 18:21:26      Final result by Sly Sanford MD (02/03/25 18:21:26)                   Impression:      No evidence of deep vein " •  ibuprofen 600 MG Oral Tab, Take 600 mg by mouth every 6 (six) hours as needed for Pain., Disp: , Rfl:   •  Amoxicillin-Pot Clavulanate 875-125 MG Oral Tab, Take 1 tablet by mouth every 12 (twelve) hours. , Disp: 20 tablet, Rfl: 0  •  methylPREDNISolone thrombosis.  Left greater saphenous vein nonocclusive thrombus seen on 01/23/2025 no longer visualized.    Electronically signed by resident: Shayan Arias  Date:    02/03/2025  Time:    18:07    Electronically signed by: Sly Sanford MD  Date:    02/03/2025  Time:    18:21               Narrative:    EXAMINATION:  US LOWER EXTREMITY VEINS BILATERAL    CLINICAL HISTORY:  dvt;    TECHNIQUE:  Duplex and color flow Doppler and dynamic compression was performed of the bilateral lower extremity veins was performed.    COMPARISON:  Ultrasound lower extremity veins 01/23/2025    FINDINGS:  Duplex and color flow Doppler evaluation does not reveal any evidence of acute venous thrombosis in the common femoral, superficial femoral, greater saphenous, popliteal, peroneal, anterior tibial and posterior tibial veins bilaterally.  There is no reflux to suggest valvular incompetence.                                       CT Abdomen Pelvis  Without Contrast (Final result)  Result time 02/03/25 13:00:33      Final result by Sly Sanford MD (02/03/25 13:00:33)                   Impression:      1. Findings suggesting volume overload including body wall anasarca, trace pleural effusions and abdominopelvic ascites.  The heart is prominent.  Correlation is advised.  2. No findings to suggest obstructive uropathy.  Nonemergent ultrasound is recommended of the kidneys to confirm cystic nature of low and high attenuating lesions.  3. Surgical change of colectomy, noting some slow flow proximal to the ostomy without obstruction.  4. Please see above for several additional findings.      Electronically signed by: Sly Sanford MD  Date:    02/03/2025  Time:    13:00               Narrative:    EXAMINATION:  CT ABDOMEN PELVIS WITHOUT CONTRAST    CLINICAL HISTORY:  Bowel obstruction suspected;Abdominal pain, acute, nonlocalized;    TECHNIQUE:  Low dose axial images, sagittal and coronal reformations were obtained from the lung  bases to the pubic symphysis.  Oral contrast was not administered.    COMPARISON:  01/29/2025 radiograph, CT 01/18/2025    FINDINGS:  Images of the lower thorax are remarkable for mild pleural effusions and bilateral compressive atelectasis of the lower lobes versus scarring.  The heart is prominent.    The liver, spleen, and adrenal glands are grossly unremarkable.  There is a subtle low attenuating lesion at the pancreatic head measuring 1.1 cm, stable noting fatty atrophy of the pancreas without pancreatic ductal dilation.  The stomach is decompressed without wall thickening.  The gallbladder is surgically absent, no significant biliary dilation status post cholecystectomy.  There is abdominopelvic ascites noting strand-like fluid in the abdomen.  No significant abdominal lymphadenopathy.    There is no hydronephrosis or nephrolithiasis.  There is bilateral perinephric fat stranding.  Several low attenuating lesions arise from the left kidney, some of which measure attenuation suggesting cysts, others are either too small for characterization or measure attenuation higher than would be expected for simple cysts.  The bilateral ureters are unremarkable without calculi seen.  The urinary bladder is decompressed without wall thickening.  The prostate is not enlarged.    There is presacral edema.  There is surgical change of distal colectomy.  There is right lower quadrant end ileostomy.  There is some slow flow proximal to the ostomy site, no high-grade obstruction.  There is scattered small bowel interloop fluid.  There is strand-like fluid in the mesentery.  There are a few scattered shotty periaortic, pericaval, and mesenteric lymph nodes.  There is atherosclerotic calcification of the aorta and its branches.    There is osteopenia.  There are degenerative changes of the bilateral sacroiliac joints, pubic symphysis, and spine.  No significant inguinal lymphadenopathy.  There is body wall anasarca.                  "                       Assessment and Plan     * Acute on chronic congestive heart failure  Patient has Systolic (HFrEF) heart failure that is Acute. On presentation their CHF was decompensated. Evidence of decompensated CHF on presentation includes: edema. The etiology of their decompensation is likely medication non-compliance. Most recent BNP and echo results are listed below.  No results for input(s): "BNP" in the last 72 hours.    Latest ECHO  Results for orders placed during the hospital encounter of 01/24/25    Echo    Interpretation Summary    Left Ventricle: The left ventricle is mildly dilated. Ventricular mass is normal. Normal wall thickness. Severe global hypokinesis present. There is severely reduced systolic function. Ejection fraction is approximately 30%. Grade I diastolic dysfunction.    Right Ventricle: Normal right ventricular cavity size. Wall thickness is normal. Right ventricle wall motion has global hypokinesis. Systolic function is moderately reduced.    Left Atrium: Left atrium is severely dilated.    Right Atrium: Right atrium is moderately dilated.    Aortic Valve: The aortic valve is a trileaflet valve. There is mild aortic valve sclerosis. There is mild aortic regurgitation with a centrally directed jet.    Mitral Valve: There is severe regurgitation with a centrally directed jet.    Tricuspid Valve: There is moderate to severe regurgitation with a centrally directed jet.    Pulmonary Artery: There is mild to moderate pulmonary hypertension. The estimated pulmonary artery systolic pressure is 42 mmHg.    IVC/SVC: Elevated venous pressure at 15 mmHg.    Pericardium: There is a small circumferential effusion. Left pleural effusion.    Current Heart Failure Medications  furosemide tablet 40 mg, Daily, Oral  metoprolol succinate (TOPROL-XL) 24 hr tablet 25 mg, Daily, Oral    Plan  - Monitor strict I&Os and daily weights.    - Place on telemetry  - Low sodium diet  - Place on fluid " "restriction of 1 L.   - prior to admission patient was told to HOLD lasix due to GRADY. Presents with LE edema, JVD, rales BLL, pleural effusion on imaging.   - lasix40 mg daily resumed. Considered increasing dosage, however cautious with hypotension this AM.         Atelectasis  I have personally reviewed Chest X-ray. Patient with atelectasis on interpretation. Likely  compressive atelectasis . Signs and symptoms include Shortness of breath Will begin treatment with incentive spirometry and upright positioning.    Pancreatic mass  CT abd w contrast reviewed: "There is a subtle low attenuating lesion at the pancreatic head measuring 1.1 cm, stable noting fatty atrophy of the pancreas without pancreatic ductal dilation."  - [facility] consulted, recommend against ERCP at this time. Plan for follow up in clinic with imaging.   - referral to [facility] at [place]      History of DVT (deep vein thrombosis)  C/W elequis  Recheck U/S - negative for DVT. Prior known L greater saphenous vein nonocclusive thrombus resolved.       Ileostomy in place  Pt reporting decreased ostomy output, reports no flatulence in past 3 days. CT abdomen performed and reviewed: "Surgical change of colectomy, noting some slow flow proximal to the ostomy without obstruction."  - pt tolerating full diet. However, if change in PO intake would consider further evaluation.   - continue close monitoring of bowel function and ostomy output       Hypotension  - BP 80/54 early this morning. Asymptomatic. Given home midodrine with improvement.   --> BP improved with , OK to continue lasix and BB  - continue to monitor closely while continuing daily diuresis   - continue midodrine 10 mg TID       History of ulcerative colitis  Stable      Chronic renal impairment  Creatine stable for now. BMP reviewed- noted Estimated Creatinine Clearance: 24.6 mL/min (A) (based on SCr of 2.5 mg/dL (H)). according to latest data. Based on current GFR, CKD stage is stage 4 - GFR 15-29.  " Monitor UOP and serial BMP and adjust therapy as needed. Renally dose meds. Avoid nephrotoxic medications and procedures.      VTE Risk Mitigation (From admission, onward)           Ordered     apixaban tablet 5 mg  2 times daily         02/03/25 1429                    Discharge Planning   JOSÉ MIGUEL: 2/7/2025     Code Status: Full Code   Medical Readiness for Discharge Date:   Discharge Plan A: Home                        Fidelia Lujan PA-C  Department of Hospital Medicine   [facility] - Acute Medical Stepdown

## 2025-02-06 NOTE — ASSESSMENT & PLAN NOTE
"Patient has Systolic (HFrEF) heart failure that is Acute. On presentation their CHF was decompensated. Evidence of decompensated CHF on presentation includes: edema. The etiology of their decompensation is likely medication non-compliance. Most recent BNP and echo results are listed below.  No results for input(s): "BNP" in the last 72 hours.    Latest ECHO  Results for orders placed during the hospital encounter of 01/24/25    Echo    Interpretation Summary    Left Ventricle: The left ventricle is mildly dilated. Ventricular mass is normal. Normal wall thickness. Severe global hypokinesis present. There is severely reduced systolic function. Ejection fraction is approximately 30%. Grade I diastolic dysfunction.    Right Ventricle: Normal right ventricular cavity size. Wall thickness is normal. Right ventricle wall motion has global hypokinesis. Systolic function is moderately reduced.    Left Atrium: Left atrium is severely dilated.    Right Atrium: Right atrium is moderately dilated.    Aortic Valve: The aortic valve is a trileaflet valve. There is mild aortic valve sclerosis. There is mild aortic regurgitation with a centrally directed jet.    Mitral Valve: There is severe regurgitation with a centrally directed jet.    Tricuspid Valve: There is moderate to severe regurgitation with a centrally directed jet.    Pulmonary Artery: There is mild to moderate pulmonary hypertension. The estimated pulmonary artery systolic pressure is 42 mmHg.    IVC/SVC: Elevated venous pressure at 15 mmHg.    Pericardium: There is a small circumferential effusion. Left pleural effusion.    Current Heart Failure Medications  furosemide tablet 40 mg, Daily, Oral  metoprolol succinate (TOPROL-XL) 24 hr tablet 25 mg, Daily, Oral    Plan  - Monitor strict I&Os and daily weights.    - Place on telemetry  - Low sodium diet  - Place on fluid restriction of 1 L.   - prior to admission patient was told to HOLD lasix due to GRADY. Presents with " LE edema, JVD, rales BLL, pleural effusion on imaging.   - lasix40 mg daily resumed. Considered increasing dosage, however cautious with hypotension this AM.

## 2025-02-06 NOTE — ASSESSMENT & PLAN NOTE
I have personally reviewed Chest X-ray. Patient with atelectasis on interpretation. Likely  compressive atelectasis . Signs and symptoms include Shortness of breath Will begin treatment with incentive spirometry and upright positioning.

## 2025-02-06 NOTE — ASSESSMENT & PLAN NOTE
"Pt reporting decreased ostomy output, reports no flatulence in past 3 days. CT abdomen performed and reviewed: "Surgical change of colectomy, noting some slow flow proximal to the ostomy without obstruction."  - pt tolerating full diet. However, if change in PO intake would consider further evaluation.   - continue close monitoring of bowel function and ostomy output     "

## 2025-02-06 NOTE — NURSING
Patients b/p 80/54 HR 60 MAP 63 patient is asymptomatic, above information reported to Brad Soliman MD awaiting response.

## 2025-02-06 NOTE — ASSESSMENT & PLAN NOTE
- BP 80/54 early this morning. Asymptomatic. Given home midodrine with improvement.   --> BP improved with , OK to continue lasix and BB  - continue to monitor closely while continuing daily diuresis   - continue midodrine 10 mg TID

## 2025-02-06 NOTE — SUBJECTIVE & OBJECTIVE
Interval History: Hypotensive episode early this morning, improved with midodrine and now stable. Continue home lasix 40 mg for now. Still minimal ostomy output and pt denies passing flatus, however CT without obstruction and pt tolerating full diet without issue. CTM for now. Pruritus significantly improved with atarax prn.    Review of Systems   Constitutional:  Negative for chills and fever.   Respiratory:  Positive for shortness of breath (exertional). Negative for chest tightness.    Cardiovascular:  Negative for chest pain and leg swelling.        BLE swelling   Gastrointestinal:  Positive for abdominal pain (cramping). Negative for nausea.        Decreased ostomy output   Not passing flatus   Neurological:  Negative for dizziness, weakness and light-headedness.     Objective:     Vital Signs (Most Recent):  Temp: 97.7 °F (36.5 °C) (02/06/25 1309)  Pulse: 80 (02/06/25 1309)  Resp: 16 (02/06/25 1309)  BP: 101/65 (02/06/25 1309)  SpO2: 100 % (02/06/25 1309) Vital Signs (24h Range):  Temp:  [95.8 °F (35.4 °C)-98.5 °F (36.9 °C)] 97.7 °F (36.5 °C)  Pulse:  [58-88] 80  Resp:  [16-20] 16  SpO2:  [98 %-100 %] 100 %  BP: ()/(54-68) 101/65     Weight: 68 kg (150 lb)  Body mass index is 19.26 kg/m².    Intake/Output Summary (Last 24 hours) at 2/6/2025 1336  Last data filed at 2/6/2025 1140  Gross per 24 hour   Intake 270 ml   Output --   Net 270 ml         Physical Exam  Constitutional:       Appearance: Normal appearance.   HENT:      Nose: Nose normal.   Eyes:      Pupils: Pupils are equal, round, and reactive to light.   Cardiovascular:      Rate and Rhythm: Normal rate.      Comments: Mild JVD  Pulmonary:      Effort: Pulmonary effort is normal.      Breath sounds: Rales (BLL) present.   Abdominal:      General: There is distension.      Tenderness: There is abdominal tenderness.      Comments: Ostomy with liquid output, no stool noted    Musculoskeletal:      Right lower leg: Edema (2+) present.      Left  "lower leg: Edema (2+) present.   Skin:     General: Skin is warm and dry.   Neurological:      Mental Status: He is oriented to person, place, and time.             Significant Labs: All pertinent labs within the past 24 hours have been reviewed.  CBC:   Recent Labs   Lab 02/06/25  0650   WBC 5.76   HGB 10.1*   HCT 34.3*   *     CMP:   Recent Labs   Lab 02/05/25  1403 02/06/25  0650    140   K 4.1 4.1    106   CO2 25 25   * 94   BUN 51* 54*   CREATININE 2.4* 2.3*   CALCIUM 8.2* 8.3*   PROT  --  5.5*   ALBUMIN  --  2.6*   BILITOT  --  0.9   ALKPHOS  --  137   AST  --  16   ALT  --  21   ANIONGAP 12 9     Cardiac Markers: No results for input(s): "CKMB", "MYOGLOBIN", "BNP", "TROPISTAT" in the last 48 hours.    Significant Imaging: I have reviewed all pertinent imaging results/findings within the past 24 hours.    Imaging Results              X-Ray Chest 1 View (Final result)  Result time 02/04/25 14:51:09      Final result by John Paul Rodriguez MD (02/04/25 14:51:09)                   Impression:      See above      Electronically signed by: John Paul Rodriguez MD  Date:    02/04/2025  Time:    14:51               Narrative:    EXAMINATION:  XR CHEST 1 VIEW    CLINICAL HISTORY:  concern for vol overload;    TECHNIQUE:  Single frontal view of the chest was performed.    COMPARISON:  01/23/2025    FINDINGS:  Cardiac size is enlarged.  Interstitial markings of the lungs are somewhat increased in this could indicate some cardiac failure.  Mild blunting the left costophrenic angle suggest pleural effusion.  Skin fold overlies the right lower in the thorax.                                       US Lower Extremity Veins Bilateral (Final result)  Result time 02/03/25 18:21:26      Final result by Sly Sanford MD (02/03/25 18:21:26)                   Impression:      No evidence of deep vein thrombosis.  Left greater saphenous vein nonocclusive thrombus seen on 01/23/2025 no longer " visualized.    Electronically signed by resident: Shayan Arias  Date:    02/03/2025  Time:    18:07    Electronically signed by: Sly Sanford MD  Date:    02/03/2025  Time:    18:21               Narrative:    EXAMINATION:  US LOWER EXTREMITY VEINS BILATERAL    CLINICAL HISTORY:  dvt;    TECHNIQUE:  Duplex and color flow Doppler and dynamic compression was performed of the bilateral lower extremity veins was performed.    COMPARISON:  Ultrasound lower extremity veins 01/23/2025    FINDINGS:  Duplex and color flow Doppler evaluation does not reveal any evidence of acute venous thrombosis in the common femoral, superficial femoral, greater saphenous, popliteal, peroneal, anterior tibial and posterior tibial veins bilaterally.  There is no reflux to suggest valvular incompetence.                                       CT Abdomen Pelvis  Without Contrast (Final result)  Result time 02/03/25 13:00:33      Final result by Sly Sanford MD (02/03/25 13:00:33)                   Impression:      1. Findings suggesting volume overload including body wall anasarca, trace pleural effusions and abdominopelvic ascites.  The heart is prominent.  Correlation is advised.  2. No findings to suggest obstructive uropathy.  Nonemergent ultrasound is recommended of the kidneys to confirm cystic nature of low and high attenuating lesions.  3. Surgical change of colectomy, noting some slow flow proximal to the ostomy without obstruction.  4. Please see above for several additional findings.      Electronically signed by: Sly Sanford MD  Date:    02/03/2025  Time:    13:00               Narrative:    EXAMINATION:  CT ABDOMEN PELVIS WITHOUT CONTRAST    CLINICAL HISTORY:  Bowel obstruction suspected;Abdominal pain, acute, nonlocalized;    TECHNIQUE:  Low dose axial images, sagittal and coronal reformations were obtained from the lung bases to the pubic symphysis.  Oral contrast was not administered.    COMPARISON:  01/29/2025  radiograph, CT 01/18/2025    FINDINGS:  Images of the lower thorax are remarkable for mild pleural effusions and bilateral compressive atelectasis of the lower lobes versus scarring.  The heart is prominent.    The liver, spleen, and adrenal glands are grossly unremarkable.  There is a subtle low attenuating lesion at the pancreatic head measuring 1.1 cm, stable noting fatty atrophy of the pancreas without pancreatic ductal dilation.  The stomach is decompressed without wall thickening.  The gallbladder is surgically absent, no significant biliary dilation status post cholecystectomy.  There is abdominopelvic ascites noting strand-like fluid in the abdomen.  No significant abdominal lymphadenopathy.    There is no hydronephrosis or nephrolithiasis.  There is bilateral perinephric fat stranding.  Several low attenuating lesions arise from the left kidney, some of which measure attenuation suggesting cysts, others are either too small for characterization or measure attenuation higher than would be expected for simple cysts.  The bilateral ureters are unremarkable without calculi seen.  The urinary bladder is decompressed without wall thickening.  The prostate is not enlarged.    There is presacral edema.  There is surgical change of distal colectomy.  There is right lower quadrant end ileostomy.  There is some slow flow proximal to the ostomy site, no high-grade obstruction.  There is scattered small bowel interloop fluid.  There is strand-like fluid in the mesentery.  There are a few scattered shotty periaortic, pericaval, and mesenteric lymph nodes.  There is atherosclerotic calcification of the aorta and its branches.    There is osteopenia.  There are degenerative changes of the bilateral sacroiliac joints, pubic symphysis, and spine.  No significant inguinal lymphadenopathy.  There is body wall anasarca.

## 2025-02-06 NOTE — TELEPHONE ENCOUNTER
Patient is currently admitted. Will send referral for review once pt is discharged for outpatient advanced endoscopy.

## 2025-02-06 NOTE — TREATMENT PLAN
Brief [facility] Treatment Plan    Jarrett Charlton Jr. is a 74 y.o. male with history of CAD s/p PCI to mid LAD in 2017, Hx of VT, Atrial tachycardia s/p AT ablation in 4/2024 and DVT on Eliquis, CHF with the EF of 35%, SBO, s/p colostomy, hypertension, hyperlipidemia, and DM2 who presented for worsening LE swelling and abdominal pain. Being treated for decompensated CHF. Non-contrast CT shows a pancreatic cystic lesion measuring 1.1 cm with no biliary or pancreatic ductal dilatation. MRI without contrast was done 2 weeks ago and shows this lesion measuring 2.0 cm, multilobular cystic lesion, again with no pancreatic duct dilatation and no biliary dilatation beyond expected post-cholecystectomy change. LFTs are normal. Cannot obtain contrasted imaging at this time due to renal function. Would recommend follow up in [facility] clinic, and will follow this lesion with imaging. I messaged schedulers for [facility] clinic follow-up.    Jayden Lowe  Gastroenterology and Hepatology Fellow, PGY-VI

## 2025-02-07 LAB
ALBUMIN SERPL BCP-MCNC: 3.1 G/DL (ref 3.5–5.2)
ALP SERPL-CCNC: 138 U/L (ref 40–150)
ALT SERPL W/O P-5'-P-CCNC: 17 U/L (ref 10–44)
ANION GAP SERPL CALC-SCNC: 9 MMOL/L (ref 8–16)
AST SERPL-CCNC: 14 U/L (ref 10–40)
BASOPHILS # BLD AUTO: 0.02 K/UL (ref 0–0.2)
BASOPHILS NFR BLD: 0.4 % (ref 0–1.9)
BILIRUB SERPL-MCNC: 0.8 MG/DL (ref 0.1–1)
BUN SERPL-MCNC: 57 MG/DL (ref 8–23)
CALCIUM SERPL-MCNC: 8.6 MG/DL (ref 8.7–10.5)
CHLORIDE SERPL-SCNC: 103 MMOL/L (ref 95–110)
CO2 SERPL-SCNC: 26 MMOL/L (ref 23–29)
CREAT SERPL-MCNC: 2.6 MG/DL (ref 0.5–1.4)
DIFFERENTIAL METHOD BLD: ABNORMAL
EOSINOPHIL # BLD AUTO: 0.1 K/UL (ref 0–0.5)
EOSINOPHIL NFR BLD: 1 % (ref 0–8)
ERYTHROCYTE [DISTWIDTH] IN BLOOD BY AUTOMATED COUNT: 19.9 % (ref 11.5–14.5)
EST. GFR  (NO RACE VARIABLE): 24.9 ML/MIN/1.73 M^2
GLUCOSE SERPL-MCNC: 106 MG/DL (ref 70–110)
HCT VFR BLD AUTO: 34.4 % (ref 40–54)
HGB BLD-MCNC: 10.4 G/DL (ref 14–18)
IMM GRANULOCYTES # BLD AUTO: 0.02 K/UL (ref 0–0.04)
IMM GRANULOCYTES NFR BLD AUTO: 0.4 % (ref 0–0.5)
LYMPHOCYTES # BLD AUTO: 1 K/UL (ref 1–4.8)
LYMPHOCYTES NFR BLD: 19.2 % (ref 18–48)
MCH RBC QN AUTO: 28 PG (ref 27–31)
MCHC RBC AUTO-ENTMCNC: 30.2 G/DL (ref 32–36)
MCV RBC AUTO: 93 FL (ref 82–98)
MONOCYTES # BLD AUTO: 0.4 K/UL (ref 0.3–1)
MONOCYTES NFR BLD: 7.1 % (ref 4–15)
NEUTROPHILS # BLD AUTO: 3.6 K/UL (ref 1.8–7.7)
NEUTROPHILS NFR BLD: 71.9 % (ref 38–73)
NRBC BLD-RTO: 0 /100 WBC
PLATELET # BLD AUTO: 136 K/UL (ref 150–450)
PMV BLD AUTO: 12.3 FL (ref 9.2–12.9)
POCT GLUCOSE: 132 MG/DL (ref 70–110)
POCT GLUCOSE: 151 MG/DL (ref 70–110)
POCT GLUCOSE: 160 MG/DL (ref 70–110)
POCT GLUCOSE: 193 MG/DL (ref 70–110)
POTASSIUM SERPL-SCNC: 4.8 MMOL/L (ref 3.5–5.1)
PROT SERPL-MCNC: 6.1 G/DL (ref 6–8.4)
RBC # BLD AUTO: 3.71 M/UL (ref 4.6–6.2)
SODIUM SERPL-SCNC: 138 MMOL/L (ref 136–145)
WBC # BLD AUTO: 5.05 K/UL (ref 3.9–12.7)

## 2025-02-07 PROCEDURE — P9047 ALBUMIN (HUMAN), 25%, 50ML: HCPCS | Mod: HCNC | Performed by: STUDENT IN AN ORGANIZED HEALTH CARE EDUCATION/TRAINING PROGRAM

## 2025-02-07 PROCEDURE — P9047 ALBUMIN (HUMAN), 25%, 50ML: HCPCS | Mod: HCNC | Performed by: PHYSICIAN ASSISTANT

## 2025-02-07 PROCEDURE — 85025 COMPLETE CBC W/AUTO DIFF WBC: CPT | Mod: HCNC | Performed by: PHYSICIAN ASSISTANT

## 2025-02-07 PROCEDURE — 63600175 PHARM REV CODE 636 W HCPCS: Mod: HCNC | Performed by: PHYSICIAN ASSISTANT

## 2025-02-07 PROCEDURE — 25000003 PHARM REV CODE 250: Mod: HCNC | Performed by: HOSPITALIST

## 2025-02-07 PROCEDURE — 36415 COLL VENOUS BLD VENIPUNCTURE: CPT | Mod: HCNC | Performed by: PHYSICIAN ASSISTANT

## 2025-02-07 PROCEDURE — 25000003 PHARM REV CODE 250: Mod: HCNC

## 2025-02-07 PROCEDURE — 25000003 PHARM REV CODE 250: Mod: HCNC | Performed by: INTERNAL MEDICINE

## 2025-02-07 PROCEDURE — 80053 COMPREHEN METABOLIC PANEL: CPT | Mod: HCNC | Performed by: PHYSICIAN ASSISTANT

## 2025-02-07 PROCEDURE — 20600001 HC STEP DOWN PRIVATE ROOM: Mod: HCNC

## 2025-02-07 PROCEDURE — 63600175 PHARM REV CODE 636 W HCPCS: Mod: HCNC | Performed by: STUDENT IN AN ORGANIZED HEALTH CARE EDUCATION/TRAINING PROGRAM

## 2025-02-07 RX ORDER — ALBUMIN HUMAN 250 G/1000ML
25 SOLUTION INTRAVENOUS ONCE
Status: COMPLETED | OUTPATIENT
Start: 2025-02-07 | End: 2025-02-07

## 2025-02-07 RX ADMIN — ALBUMIN (HUMAN) 25 G: 12.5 SOLUTION INTRAVENOUS at 08:02

## 2025-02-07 RX ADMIN — MIDODRINE HYDROCHLORIDE 10 MG: 5 TABLET ORAL at 07:02

## 2025-02-07 RX ADMIN — BRIMONIDINE TARTRATE 1 DROP: 2 SOLUTION OPHTHALMIC at 09:02

## 2025-02-07 RX ADMIN — ALLOPURINOL 400 MG: 100 TABLET ORAL at 08:02

## 2025-02-07 RX ADMIN — AMIODARONE HYDROCHLORIDE 200 MG: 200 TABLET ORAL at 08:02

## 2025-02-07 RX ADMIN — AMIODARONE HYDROCHLORIDE 200 MG: 200 TABLET ORAL at 09:02

## 2025-02-07 RX ADMIN — ASPIRIN 81 MG: 81 TABLET, COATED ORAL at 08:02

## 2025-02-07 RX ADMIN — MIDODRINE HYDROCHLORIDE 10 MG: 5 TABLET ORAL at 11:02

## 2025-02-07 RX ADMIN — HYDROXYZINE HYDROCHLORIDE 25 MG: 25 TABLET, FILM COATED ORAL at 05:02

## 2025-02-07 RX ADMIN — APIXABAN 5 MG: 5 TABLET, FILM COATED ORAL at 08:02

## 2025-02-07 RX ADMIN — APIXABAN 5 MG: 5 TABLET, FILM COATED ORAL at 09:02

## 2025-02-07 RX ADMIN — ALBUMIN (HUMAN) 25 G: 12.5 SOLUTION INTRAVENOUS at 11:02

## 2025-02-07 RX ADMIN — MIDODRINE HYDROCHLORIDE 10 MG: 5 TABLET ORAL at 05:02

## 2025-02-07 RX ADMIN — BRIMONIDINE TARTRATE 1 DROP: 2 SOLUTION OPHTHALMIC at 08:02

## 2025-02-07 RX ADMIN — TAMSULOSIN HYDROCHLORIDE 0.4 MG: 0.4 CAPSULE ORAL at 08:02

## 2025-02-07 RX ADMIN — FUROSEMIDE 40 MG: 40 TABLET ORAL at 02:02

## 2025-02-07 RX ADMIN — SODIUM BICARBONATE 650 MG TABLET 1300 MG: at 09:02

## 2025-02-07 RX ADMIN — SODIUM BICARBONATE 650 MG TABLET 1300 MG: at 08:02

## 2025-02-07 NOTE — PROGRESS NOTES
[facility]  Progress Note    Patient Name: Jarrett Charlton Jr.  MRN: [id number]  Patient Class: IP- Inpatient   Admission Date: 2/3/2025  Length of Stay: 3 days  Attending Physician: Jamie Luu MD  Primary Care Provider: Tripp Mohan MD        Subjective     Principal Problem:Acute on chronic congestive heart failure        HPI:  74 y.o. male with a PMH of CAD s/p PCI to mid LAD in 2017, Hx of VT, Atrial tachycardia s/p AT ablation in 4/2024 and DVT on Eliquis, CHF with the EF of 35%, SBO, s/p colostomy, hypertension, hyperlipidemia, and DM2 presenting to the ED for worsening lower extremity swelling and abdominal pain. Patient was recently discharged from [facility] on 1/30 for workup of DVT, found to be superficial vein thrombosis. Hospital course c/b GRADY. Patient also found to have new pancreatic lesion with plans for outpatient ERCP. Patient states since discharge he has had worsening abdominal cramping and bloating. Notes decreased ostomy output. Was initially passing gas but states he has not been able to for the past 3 days. Denies fever, chills, N/V. Patient also reporting worsening BLE edema since discharge. States he was told to stop taking his home lasix due to his GRADY with plans to restart in on Monday, 2/10. Patient also notes 3 weeks of generalized pruritus that has progressively worsened.     Overview/Hospital Course:  Jarrett Charlton Jr. Was admitted to  with abdominal cramping and decreased ostomy output. Imaging negative for obstruction. Decreased output persists, but pt tolerating full diet without issue. BP remains intermittently soft, but improved once midodrine increased to home dose of 10 mg TID. Volume overload on exam, home lasix dose resumed for now. Caution diuresis with hypotension. [facility] consulted concerning ERCP --> recommend against ERCP, will follow up in clinic for further imaging. Derm consulted for scabies concern, determined dermatitis and  gave recs for pruritus, which is helping significantly. No evidence of DVT on repeat U/S. Hypotensive, given albumin X 2 with improvement. Continue midodrine.     Interval History: Patient seen at bedside. Reports generalized fatigue and eyelid heaviness, found to be hypotensive, given albumin X 2 with improvement. Home metoprolol held. Continuing midodrine. Will given PO lasix at home dose given continued need for diuresis. Ostomy output has returned to normal. Hopeful for discharge in am if BP and breathing remain stable.     Review of Systems   Constitutional:  Negative for chills and fever.   Respiratory:  Positive for shortness of breath (exertional). Negative for chest tightness and wheezing.    Cardiovascular:  Negative for chest pain and leg swelling.        BLE swelling   Gastrointestinal:  Negative for abdominal pain (cramping) and nausea.   Musculoskeletal:  Negative for arthralgias and back pain.   Neurological:  Negative for dizziness, weakness and light-headedness.   Psychiatric/Behavioral:  Negative for agitation and behavioral problems.      Objective:     Vital Signs (Most Recent):  Temp: 97.9 °F (36.6 °C) (02/07/25 0722)  Pulse: 67 (02/07/25 0722)  Resp: 18 (02/07/25 0722)  BP: 101/67 (02/07/25 1330)  SpO2: 98 % (02/07/25 1300) Vital Signs (24h Range):  Temp:  [97.7 °F (36.5 °C)-98.1 °F (36.7 °C)] 97.9 °F (36.6 °C)  Pulse:  [67-75] 67  Resp:  [14-18] 18  SpO2:  [97 %-100 %] 98 %  BP: ()/(54-67) 101/67     Weight: 68 kg (150 lb)  Body mass index is 19.26 kg/m².    Intake/Output Summary (Last 24 hours) at 2/7/2025 1342  Last data filed at 2/6/2025 1453  Gross per 24 hour   Intake 480 ml   Output --   Net 480 ml         Physical Exam  Constitutional:       Appearance: Normal appearance.   HENT:      Head: Normocephalic and atraumatic.      Nose: Nose normal.   Eyes:      Pupils: Pupils are equal, round, and reactive to light.   Cardiovascular:      Rate and Rhythm: Normal rate and regular  "rhythm.      Comments: Mild JVD  Pulmonary:      Effort: Pulmonary effort is normal.      Breath sounds: Rales (BLL) present. No wheezing.   Chest:      Chest wall: No tenderness.   Abdominal:      General: There is no distension.      Tenderness: There is no abdominal tenderness.      Comments: Ostomy with some stool output   Musculoskeletal:      Right lower leg: Edema (2+) present.      Left lower leg: Edema (2+) present.   Skin:     General: Skin is warm and dry.   Neurological:      General: No focal deficit present.      Mental Status: He is oriented to person, place, and time.   Psychiatric:         Mood and Affect: Mood normal.         Behavior: Behavior normal.             Significant Labs: All pertinent labs within the past 24 hours have been reviewed.  CBC:   Recent Labs   Lab 02/06/25  0650 02/07/25  1001   WBC 5.76 5.05   HGB 10.1* 10.4*   HCT 34.3* 34.4*   * 136*     CMP:   Recent Labs   Lab 02/05/25  1403 02/06/25  0650 02/07/25  1001    140 138   K 4.1 4.1 4.8    106 103   CO2 25 25 26   * 94 106   BUN 51* 54* 57*   CREATININE 2.4* 2.3* 2.6*   CALCIUM 8.2* 8.3* 8.6*   PROT  --  5.5* 6.1   ALBUMIN  --  2.6* 3.1*   BILITOT  --  0.9 0.8   ALKPHOS  --  137 138   AST  --  16 14   ALT  --  21 17   ANIONGAP 12 9 9       Significant Imaging: I have reviewed all pertinent imaging results/findings within the past 24 hours.    Assessment and Plan     * Acute on chronic congestive heart failure  Patient has Systolic (HFrEF) heart failure that is Acute. On presentation their CHF was decompensated. Evidence of decompensated CHF on presentation includes: edema. The etiology of their decompensation is likely medication non-compliance. Most recent BNP and echo results are listed below.  No results for input(s): "BNP" in the last 72 hours.    Latest ECHO  Results for orders placed during the hospital encounter of 01/24/25    Echo    Interpretation Summary    Left Ventricle: The left ventricle " "is mildly dilated. Ventricular mass is normal. Normal wall thickness. Severe global hypokinesis present. There is severely reduced systolic function. Ejection fraction is approximately 30%. Grade I diastolic dysfunction.    Right Ventricle: Normal right ventricular cavity size. Wall thickness is normal. Right ventricle wall motion has global hypokinesis. Systolic function is moderately reduced.    Left Atrium: Left atrium is severely dilated.    Right Atrium: Right atrium is moderately dilated.    Aortic Valve: The aortic valve is a trileaflet valve. There is mild aortic valve sclerosis. There is mild aortic regurgitation with a centrally directed jet.    Mitral Valve: There is severe regurgitation with a centrally directed jet.    Tricuspid Valve: There is moderate to severe regurgitation with a centrally directed jet.    Pulmonary Artery: There is mild to moderate pulmonary hypertension. The estimated pulmonary artery systolic pressure is 42 mmHg.    IVC/SVC: Elevated venous pressure at 15 mmHg.    Pericardium: There is a small circumferential effusion. Left pleural effusion.    Current Heart Failure Medications  furosemide tablet 40 mg, Daily, Oral  metoprolol succinate (TOPROL-XL) 24 hr tablet 25 mg, Daily, Oral    Plan  - Monitor strict I&Os and daily weights.    - Place on telemetry  - Low sodium diet  - Place on fluid restriction of 1 L.   - prior to admission patient was told to HOLD lasix due to GRADY. Presents with LE edema, JVD, rales BLL, pleural effusion on imaging.   - lasix40 mg daily resumed. Considered increasing dosage, however cautious with hypotension this AM.         Atelectasis  I have personally reviewed Chest X-ray. Patient with atelectasis on interpretation. Likely  compressive atelectasis . Signs and symptoms include Shortness of breath Will begin treatment with incentive spirometry and upright positioning.    Pancreatic mass  CT abd w contrast reviewed: "There is a subtle low attenuating " "lesion at the pancreatic head measuring 1.1 cm, stable noting fatty atrophy of the pancreas without pancreatic ductal dilation."  - [facility] consulted, recommend against ERCP at this time. Plan for follow up in clinic with imaging.   - referral to [facility] at [place]      History of DVT (deep vein thrombosis)  C/W elequis  Recheck U/S - negative for DVT. Prior known L greater saphenous vein nonocclusive thrombus resolved.       Pulmonary hypertension  - diuresis as above      Ileostomy in place  Pt reporting decreased ostomy output, reports no flatulence in past 3 days. CT abdomen performed and reviewed: "Surgical change of colectomy, noting some slow flow proximal to the ostomy without obstruction."  - pt tolerating full diet. However, if change in PO intake would consider further evaluation.   - continue close monitoring of bowel function and ostomy   - now having normal ostomy output      Hypotension  - 2/6: BP 80/54 early this morning. Asymptomatic. Given home midodrine with improvement.   --> BP improved with , OK to continue lasix and BB  - 2/7: BP 88/54; given albumin X 2 with improvement. Continue midodrine, hold metoprolol   - continue to monitor closely while continuing daily diuresis   - continue midodrine 10 mg TID       History of ulcerative colitis  Stable      Chronic renal impairment, stage 4 (severe)  Creatine stable for now. BMP reviewed- noted Estimated Creatinine Clearance: 23.6 mL/min (A) (based on SCr of 2.6 mg/dL (H)). according to latest data. Based on current GFR, CKD stage is stage 4 - GFR 15-29.  Monitor UOP and serial BMP and adjust therapy as needed. Renally dose meds. Avoid nephrotoxic medications and procedures.  - baseline 2.5- 3      VTE Risk Mitigation (From admission, onward)           Ordered     apixaban tablet 5 mg  2 times daily         02/03/25 1421                    Discharge Planning   JOSÉ MIGUEL: 2/8/2025     Code Status: Full Code   Medical Readiness for Discharge Date:   Discharge Plan " A: Home Health   Discharge Delays: None known at this time                    Keesha Harris PA-C  Department of Hospital Medicine   [facility] - Acute Medical Stepdown

## 2025-02-07 NOTE — ASSESSMENT & PLAN NOTE
Creatine stable for now. BMP reviewed- noted Estimated Creatinine Clearance: 23.6 mL/min (A) (based on SCr of 2.6 mg/dL (H)). according to latest data. Based on current GFR, CKD stage is stage 4 - GFR 15-29.  Monitor UOP and serial BMP and adjust therapy as needed. Renally dose meds. Avoid nephrotoxic medications and procedures.  - baseline 2.5- 3

## 2025-02-07 NOTE — NURSING
Bp is low patient states his eyes feel heavy but no other symptoms, dr christine notified per dr christine albumin may be ordered midodrine will be given now. Educated the patient to call for help before changing positions he verbalized understanding. Will continue to monitor.

## 2025-02-07 NOTE — TELEPHONE ENCOUNTER
----- Message from Med Assistant Ramirez sent at 2/6/2025 10:13 AM CST -----  Regarding: FW: [facility] clinic    ----- Message -----  From: Jayden Lowe MD  Sent: 2/6/2025   9:37 AM CST  To: Ashley Zazueta MA  Subject: [facility] clinic                                       Can we set up [facility] clinic follow-up for Mr. Charlton? Currently hospitalized, not sure on discharge timing, but can be seen in clinic in 1-2 months. To follow pancreatic cystic lesion.    Thanks,  Jayden

## 2025-02-07 NOTE — CARE UPDATE
"RAPID RESPONSE NURSE CHART REVIEW        Chart Reviewed: 02/07/2025, 11:36 AM    MRN: [id number]  Bed: 36341/96155 A    Dx: Acute on chronic congestive heart failure    Jarrett Charlton Jr. has a past medical history of Anticoagulant long-term use, Basal cell carcinoma, BPH (benign prostatic hyperplasia), Carotid stenosis, Chronic kidney disease, Claudication, DDD (degenerative disc disease), Diabetes mellitus with renal complications, Disc disease, degenerative, cervical, DVT (deep venous thrombosis), Encounter for blood transfusion, GERD (gastroesophageal reflux disease), Gout, chronic, History of ulcerative colitis, HLD (hyperlipidemia), HTN (hypertension), Ileostomy in place, Paroxysmal atrial fibrillation, RBBB, Squamous cell carcinoma, Squamous cell carcinoma, Syncope, and Ventricular tachycardia.    Last VS: BP 91/62   Pulse 67   Temp 97.9 °F (36.6 °C) (Oral)   Resp 18   Ht 6' 2" (1.88 m)   Wt 68 kg (150 lb)   SpO2 100%   BMI 19.26 kg/m²     24H Vital Sign Range:  Temp:  [97.7 °F (36.5 °C)-98.1 °F (36.7 °C)]   Pulse:  [67-80]   Resp:  [14-18]   BP: ()/(54-65)   SpO2:  [97 %-100 %]     Level of Consciousness (AVPU): alert    Recent Labs     02/06/25  0650 02/07/25  1001   WBC 5.76 5.05   HGB 10.1* 10.4*   HCT 34.3* 34.4*   * 136*       Recent Labs     02/05/25  1403 02/06/25  0650 02/07/25  1001    140 138   K 4.1 4.1 4.8    106 103   CO2 25 25 26   BUN 51* 54* 57*   CREATININE 2.4* 2.3* 2.6*   * 94 106      MEWS score: 2    Rounding completed with charge GHASSAN Camejo reports pt experienced hypotensive episode and midodrine was administered.  Pt BP improved but remains soft at 90s/60s.  Pt to receive IV Albumin. No additional concerns verbalized at this time. The team will continue to monitor.  Instructed to call [phone] for further concerns or assistance.    Jonathan Mederos RN        "

## 2025-02-07 NOTE — SUBJECTIVE & OBJECTIVE
Interval History: Patient seen at bedside. Reports generalized fatigue and eyelid heaviness, found to be hypotensive, given albumin X 2 with improvement. Home metoprolol held. Continuing midodrine. Will given PO lasix at home dose given continued need for diuresis. Ostomy output has returned to normal. Hopeful for discharge in am if BP and breathing remain stable.     Review of Systems   Constitutional:  Negative for chills and fever.   Respiratory:  Positive for shortness of breath (exertional). Negative for chest tightness and wheezing.    Cardiovascular:  Negative for chest pain and leg swelling.        BLE swelling   Gastrointestinal:  Negative for abdominal pain (cramping) and nausea.   Musculoskeletal:  Negative for arthralgias and back pain.   Neurological:  Negative for dizziness, weakness and light-headedness.   Psychiatric/Behavioral:  Negative for agitation and behavioral problems.      Objective:     Vital Signs (Most Recent):  Temp: 97.9 °F (36.6 °C) (02/07/25 0722)  Pulse: 67 (02/07/25 0722)  Resp: 18 (02/07/25 0722)  BP: 101/67 (02/07/25 1330)  SpO2: 98 % (02/07/25 1300) Vital Signs (24h Range):  Temp:  [97.7 °F (36.5 °C)-98.1 °F (36.7 °C)] 97.9 °F (36.6 °C)  Pulse:  [67-75] 67  Resp:  [14-18] 18  SpO2:  [97 %-100 %] 98 %  BP: ()/(54-67) 101/67     Weight: 68 kg (150 lb)  Body mass index is 19.26 kg/m².    Intake/Output Summary (Last 24 hours) at 2/7/2025 1342  Last data filed at 2/6/2025 1453  Gross per 24 hour   Intake 480 ml   Output --   Net 480 ml         Physical Exam  Constitutional:       Appearance: Normal appearance.   HENT:      Head: Normocephalic and atraumatic.      Nose: Nose normal.   Eyes:      Pupils: Pupils are equal, round, and reactive to light.   Cardiovascular:      Rate and Rhythm: Normal rate and regular rhythm.      Comments: Mild JVD  Pulmonary:      Effort: Pulmonary effort is normal.      Breath sounds: Rales (BLL) present. No wheezing.   Chest:      Chest wall: No  tenderness.   Abdominal:      General: There is no distension.      Tenderness: There is no abdominal tenderness.      Comments: Ostomy with some stool output   Musculoskeletal:      Right lower leg: Edema (2+) present.      Left lower leg: Edema (2+) present.   Skin:     General: Skin is warm and dry.   Neurological:      General: No focal deficit present.      Mental Status: He is oriented to person, place, and time.   Psychiatric:         Mood and Affect: Mood normal.         Behavior: Behavior normal.             Significant Labs: All pertinent labs within the past 24 hours have been reviewed.  CBC:   Recent Labs   Lab 02/06/25  0650 02/07/25  1001   WBC 5.76 5.05   HGB 10.1* 10.4*   HCT 34.3* 34.4*   * 136*     CMP:   Recent Labs   Lab 02/05/25  1403 02/06/25  0650 02/07/25  1001    140 138   K 4.1 4.1 4.8    106 103   CO2 25 25 26   * 94 106   BUN 51* 54* 57*   CREATININE 2.4* 2.3* 2.6*   CALCIUM 8.2* 8.3* 8.6*   PROT  --  5.5* 6.1   ALBUMIN  --  2.6* 3.1*   BILITOT  --  0.9 0.8   ALKPHOS  --  137 138   AST  --  16 14   ALT  --  21 17   ANIONGAP 12 9 9       Significant Imaging: I have reviewed all pertinent imaging results/findings within the past 24 hours.

## 2025-02-07 NOTE — PLAN OF CARE
Hypotensive at the start of the shift, dr christine ordered 2 doses of albumin and metoprolol held patient bp now is ranging systolic low 100s-90s. No c/o pain or discomfort and no distress noted. Safety precautions maintained call light in reach and plan of care ongoing.                Problem: Adult Inpatient Plan of Care  Goal: Plan of Care Review  Outcome: Progressing  Goal: Patient-Specific Goal (Individualized)  Outcome: Progressing  Goal: Absence of Hospital-Acquired Illness or Injury  Outcome: Progressing  Goal: Optimal Comfort and Wellbeing  Outcome: Progressing  Goal: Readiness for Transition of Care  Outcome: Progressing     Problem: Heart Failure  Goal: Optimal Coping  Outcome: Progressing  Goal: Optimal Cardiac Output  Outcome: Progressing  Goal: Stable Heart Rate and Rhythm  Outcome: Progressing  Goal: Optimal Functional Ability  Outcome: Progressing  Goal: Fluid and Electrolyte Balance  Outcome: Progressing  Goal: Improved Oral Intake  Outcome: Progressing  Goal: Effective Oxygenation and Ventilation  Outcome: Progressing  Goal: Effective Breathing Pattern During Sleep  Outcome: Progressing     Problem: Diabetes Comorbidity  Goal: Blood Glucose Level Within Targeted Range  Outcome: Progressing     Problem: Acute Kidney Injury/Impairment  Goal: Fluid and Electrolyte Balance  Outcome: Progressing  Goal: Improved Oral Intake  Outcome: Progressing  Goal: Effective Renal Function  Outcome: Progressing     Problem: Wound  Goal: Optimal Coping  Outcome: Progressing  Goal: Optimal Functional Ability  Outcome: Progressing  Goal: Absence of Infection Signs and Symptoms  Outcome: Progressing  Goal: Improved Oral Intake  Outcome: Progressing  Goal: Optimal Pain Control and Function  Outcome: Progressing  Goal: Skin Health and Integrity  Outcome: Progressing  Goal: Optimal Wound Healing  Outcome: Progressing     Problem: Fall Injury Risk  Goal: Absence of Fall and Fall-Related Injury  Outcome: Progressing      Problem: Skin Injury Risk Increased  Goal: Skin Health and Integrity  Outcome: Progressing

## 2025-02-07 NOTE — ASSESSMENT & PLAN NOTE
"Pt reporting decreased ostomy output, reports no flatulence in past 3 days. CT abdomen performed and reviewed: "Surgical change of colectomy, noting some slow flow proximal to the ostomy without obstruction."  - pt tolerating full diet. However, if change in PO intake would consider further evaluation.   - continue close monitoring of bowel function and ostomy   - now having normal ostomy output    "

## 2025-02-07 NOTE — ASSESSMENT & PLAN NOTE
- 2/6: BP 80/54 early this morning. Asymptomatic. Given home midodrine with improvement.   --> BP improved with , OK to continue lasix and BB  - 2/7: BP 88/54; given albumin X 2 with improvement. Continue midodrine, hold metoprolol   - continue to monitor closely while continuing daily diuresis   - continue midodrine 10 mg TID

## 2025-02-08 LAB
ALBUMIN SERPL BCP-MCNC: 3 G/DL (ref 3.5–5.2)
ALP SERPL-CCNC: 146 U/L (ref 40–150)
ALT SERPL W/O P-5'-P-CCNC: 26 U/L (ref 10–44)
ANION GAP SERPL CALC-SCNC: 10 MMOL/L (ref 8–16)
APTT PPP: 29.6 SEC (ref 21–32)
APTT PPP: 29.6 SEC (ref 21–32)
APTT PPP: 44 SEC (ref 21–32)
AST SERPL-CCNC: 27 U/L (ref 10–40)
BASOPHILS # BLD AUTO: 0.02 K/UL (ref 0–0.2)
BASOPHILS # BLD AUTO: 0.03 K/UL (ref 0–0.2)
BASOPHILS NFR BLD: 0.5 % (ref 0–1.9)
BASOPHILS NFR BLD: 0.8 % (ref 0–1.9)
BILIRUB SERPL-MCNC: 0.6 MG/DL (ref 0.1–1)
BUN SERPL-MCNC: 57 MG/DL (ref 8–23)
CALCIUM SERPL-MCNC: 8.2 MG/DL (ref 8.7–10.5)
CHLORIDE SERPL-SCNC: 103 MMOL/L (ref 95–110)
CO2 SERPL-SCNC: 22 MMOL/L (ref 23–29)
CREAT SERPL-MCNC: 2.6 MG/DL (ref 0.5–1.4)
DIFFERENTIAL METHOD BLD: ABNORMAL
DIFFERENTIAL METHOD BLD: ABNORMAL
EOSINOPHIL # BLD AUTO: 0 K/UL (ref 0–0.5)
EOSINOPHIL # BLD AUTO: 0.1 K/UL (ref 0–0.5)
EOSINOPHIL NFR BLD: 1.1 % (ref 0–8)
EOSINOPHIL NFR BLD: 2.1 % (ref 0–8)
ERYTHROCYTE [DISTWIDTH] IN BLOOD BY AUTOMATED COUNT: 19.4 % (ref 11.5–14.5)
ERYTHROCYTE [DISTWIDTH] IN BLOOD BY AUTOMATED COUNT: 19.4 % (ref 11.5–14.5)
EST. GFR  (NO RACE VARIABLE): 24.9 ML/MIN/1.73 M^2
GLUCOSE SERPL-MCNC: 140 MG/DL (ref 70–110)
HCT VFR BLD AUTO: 32.6 % (ref 40–54)
HCT VFR BLD AUTO: 33.6 % (ref 40–54)
HGB BLD-MCNC: 10 G/DL (ref 14–18)
HGB BLD-MCNC: 9.6 G/DL (ref 14–18)
IMM GRANULOCYTES # BLD AUTO: 0.01 K/UL (ref 0–0.04)
IMM GRANULOCYTES # BLD AUTO: 0.02 K/UL (ref 0–0.04)
IMM GRANULOCYTES NFR BLD AUTO: 0.3 % (ref 0–0.5)
IMM GRANULOCYTES NFR BLD AUTO: 0.5 % (ref 0–0.5)
INR PPP: 1.5 (ref 0.8–1.2)
LACTATE SERPL-SCNC: 1.1 MMOL/L (ref 0.5–2.2)
LACTATE SERPL-SCNC: 1.3 MMOL/L (ref 0.5–2.2)
LACTATE SERPL-SCNC: 2.4 MMOL/L (ref 0.5–2.2)
LYMPHOCYTES # BLD AUTO: 0.7 K/UL (ref 1–4.8)
LYMPHOCYTES # BLD AUTO: 0.8 K/UL (ref 1–4.8)
LYMPHOCYTES NFR BLD: 19.7 % (ref 18–48)
LYMPHOCYTES NFR BLD: 20.2 % (ref 18–48)
MCH RBC QN AUTO: 26.8 PG (ref 27–31)
MCH RBC QN AUTO: 27 PG (ref 27–31)
MCHC RBC AUTO-ENTMCNC: 29.4 G/DL (ref 32–36)
MCHC RBC AUTO-ENTMCNC: 29.8 G/DL (ref 32–36)
MCV RBC AUTO: 90 FL (ref 82–98)
MCV RBC AUTO: 92 FL (ref 82–98)
MONOCYTES # BLD AUTO: 0.3 K/UL (ref 0.3–1)
MONOCYTES # BLD AUTO: 0.3 K/UL (ref 0.3–1)
MONOCYTES NFR BLD: 8 % (ref 4–15)
MONOCYTES NFR BLD: 8.6 % (ref 4–15)
NEUTROPHILS # BLD AUTO: 2.6 K/UL (ref 1.8–7.7)
NEUTROPHILS # BLD AUTO: 2.7 K/UL (ref 1.8–7.7)
NEUTROPHILS NFR BLD: 68.6 % (ref 38–73)
NEUTROPHILS NFR BLD: 69.6 % (ref 38–73)
NRBC BLD-RTO: 0 /100 WBC
NRBC BLD-RTO: 0 /100 WBC
PLATELET # BLD AUTO: 105 K/UL (ref 150–450)
PLATELET # BLD AUTO: 88 K/UL (ref 150–450)
PMV BLD AUTO: 10.7 FL (ref 9.2–12.9)
PMV BLD AUTO: 11.9 FL (ref 9.2–12.9)
POCT GLUCOSE: 137 MG/DL (ref 70–110)
POCT GLUCOSE: 144 MG/DL (ref 70–110)
POCT GLUCOSE: 145 MG/DL (ref 70–110)
POCT GLUCOSE: 183 MG/DL (ref 70–110)
POTASSIUM SERPL-SCNC: 4.8 MMOL/L (ref 3.5–5.1)
PROT SERPL-MCNC: 5.8 G/DL (ref 6–8.4)
PROTHROMBIN TIME: 15.6 SEC (ref 9–12.5)
RBC # BLD AUTO: 3.55 M/UL (ref 4.6–6.2)
RBC # BLD AUTO: 3.73 M/UL (ref 4.6–6.2)
SODIUM SERPL-SCNC: 135 MMOL/L (ref 136–145)
WBC # BLD AUTO: 3.71 K/UL (ref 3.9–12.7)
WBC # BLD AUTO: 3.87 K/UL (ref 3.9–12.7)

## 2025-02-08 PROCEDURE — 80053 COMPREHEN METABOLIC PANEL: CPT | Mod: HCNC | Performed by: PHYSICIAN ASSISTANT

## 2025-02-08 PROCEDURE — 85610 PROTHROMBIN TIME: CPT | Mod: HCNC | Performed by: STUDENT IN AN ORGANIZED HEALTH CARE EDUCATION/TRAINING PROGRAM

## 2025-02-08 PROCEDURE — 25000003 PHARM REV CODE 250: Mod: HCNC | Performed by: INTERNAL MEDICINE

## 2025-02-08 PROCEDURE — 25000003 PHARM REV CODE 250: Mod: HCNC | Performed by: HOSPITALIST

## 2025-02-08 PROCEDURE — 25000003 PHARM REV CODE 250: Mod: HCNC | Performed by: PHYSICIAN ASSISTANT

## 2025-02-08 PROCEDURE — 85025 COMPLETE CBC W/AUTO DIFF WBC: CPT | Mod: 91,HCNC | Performed by: STUDENT IN AN ORGANIZED HEALTH CARE EDUCATION/TRAINING PROGRAM

## 2025-02-08 PROCEDURE — 36415 COLL VENOUS BLD VENIPUNCTURE: CPT | Mod: HCNC,XB

## 2025-02-08 PROCEDURE — 25000003 PHARM REV CODE 250: Mod: HCNC

## 2025-02-08 PROCEDURE — 20000000 HC ICU ROOM: Mod: HCNC

## 2025-02-08 PROCEDURE — 36415 COLL VENOUS BLD VENIPUNCTURE: CPT | Mod: HCNC | Performed by: PHYSICIAN ASSISTANT

## 2025-02-08 PROCEDURE — 85730 THROMBOPLASTIN TIME PARTIAL: CPT | Mod: 91,HCNC | Performed by: INTERNAL MEDICINE

## 2025-02-08 PROCEDURE — 36415 COLL VENOUS BLD VENIPUNCTURE: CPT | Mod: HCNC,XB | Performed by: STUDENT IN AN ORGANIZED HEALTH CARE EDUCATION/TRAINING PROGRAM

## 2025-02-08 PROCEDURE — 63600175 PHARM REV CODE 636 W HCPCS: Mod: HCNC | Performed by: STUDENT IN AN ORGANIZED HEALTH CARE EDUCATION/TRAINING PROGRAM

## 2025-02-08 PROCEDURE — 36415 COLL VENOUS BLD VENIPUNCTURE: CPT | Mod: HCNC | Performed by: STUDENT IN AN ORGANIZED HEALTH CARE EDUCATION/TRAINING PROGRAM

## 2025-02-08 PROCEDURE — 85025 COMPLETE CBC W/AUTO DIFF WBC: CPT | Mod: HCNC | Performed by: PHYSICIAN ASSISTANT

## 2025-02-08 PROCEDURE — 83605 ASSAY OF LACTIC ACID: CPT | Mod: HCNC | Performed by: STUDENT IN AN ORGANIZED HEALTH CARE EDUCATION/TRAINING PROGRAM

## 2025-02-08 PROCEDURE — 94761 N-INVAS EAR/PLS OXIMETRY MLT: CPT | Mod: HCNC

## 2025-02-08 PROCEDURE — 83605 ASSAY OF LACTIC ACID: CPT | Mod: 91,HCNC

## 2025-02-08 PROCEDURE — 85730 THROMBOPLASTIN TIME PARTIAL: CPT | Mod: HCNC | Performed by: STUDENT IN AN ORGANIZED HEALTH CARE EDUCATION/TRAINING PROGRAM

## 2025-02-08 PROCEDURE — P9047 ALBUMIN (HUMAN), 25%, 50ML: HCPCS | Mod: HCNC | Performed by: STUDENT IN AN ORGANIZED HEALTH CARE EDUCATION/TRAINING PROGRAM

## 2025-02-08 RX ORDER — FUROSEMIDE 10 MG/ML
80 INJECTION INTRAMUSCULAR; INTRAVENOUS ONCE
Status: COMPLETED | OUTPATIENT
Start: 2025-02-08 | End: 2025-02-08

## 2025-02-08 RX ORDER — ALBUMIN HUMAN 250 G/1000ML
50 SOLUTION INTRAVENOUS ONCE
Status: COMPLETED | OUTPATIENT
Start: 2025-02-08 | End: 2025-02-08

## 2025-02-08 RX ORDER — MUPIROCIN 20 MG/G
OINTMENT TOPICAL 2 TIMES DAILY
Status: DISCONTINUED | OUTPATIENT
Start: 2025-02-08 | End: 2025-02-12 | Stop reason: HOSPADM

## 2025-02-08 RX ORDER — HEPARIN SODIUM,PORCINE/D5W 25000/250
0-40 INTRAVENOUS SOLUTION INTRAVENOUS CONTINUOUS
Status: DISCONTINUED | OUTPATIENT
Start: 2025-02-08 | End: 2025-02-09

## 2025-02-08 RX ADMIN — AMIODARONE HYDROCHLORIDE 200 MG: 200 TABLET ORAL at 08:02

## 2025-02-08 RX ADMIN — BRIMONIDINE TARTRATE 1 DROP: 2 SOLUTION OPHTHALMIC at 08:02

## 2025-02-08 RX ADMIN — ALBUMIN (HUMAN) 50 G: 12.5 SOLUTION INTRAVENOUS at 10:02

## 2025-02-08 RX ADMIN — FAMOTIDINE 20 MG: 20 TABLET ORAL at 08:02

## 2025-02-08 RX ADMIN — MUPIROCIN: 20 OINTMENT TOPICAL at 08:02

## 2025-02-08 RX ADMIN — MIDODRINE HYDROCHLORIDE 10 MG: 5 TABLET ORAL at 04:02

## 2025-02-08 RX ADMIN — SODIUM CHLORIDE, POTASSIUM CHLORIDE, SODIUM LACTATE AND CALCIUM CHLORIDE 250 ML: 600; 310; 30; 20 INJECTION, SOLUTION INTRAVENOUS at 12:02

## 2025-02-08 RX ADMIN — MIDODRINE HYDROCHLORIDE 10 MG: 5 TABLET ORAL at 11:02

## 2025-02-08 RX ADMIN — TAMSULOSIN HYDROCHLORIDE 0.4 MG: 0.4 CAPSULE ORAL at 08:02

## 2025-02-08 RX ADMIN — MIDODRINE HYDROCHLORIDE 10 MG: 5 TABLET ORAL at 08:02

## 2025-02-08 RX ADMIN — FUROSEMIDE 80 MG: 10 INJECTION, SOLUTION INTRAVENOUS at 01:02

## 2025-02-08 RX ADMIN — ALBUMIN (HUMAN) 50 G: 12.5 SOLUTION INTRAVENOUS at 06:02

## 2025-02-08 RX ADMIN — COLCHICINE 0.6 MG: 0.6 TABLET ORAL at 08:02

## 2025-02-08 RX ADMIN — METOPROLOL SUCCINATE 25 MG: 25 TABLET, EXTENDED RELEASE ORAL at 08:02

## 2025-02-08 RX ADMIN — ONDANSETRON HYDROCHLORIDE 4 MG: 4 TABLET, FILM COATED ORAL at 02:02

## 2025-02-08 RX ADMIN — HYDROXYZINE HYDROCHLORIDE 25 MG: 25 TABLET, FILM COATED ORAL at 08:02

## 2025-02-08 RX ADMIN — FUROSEMIDE 80 MG: 10 INJECTION, SOLUTION INTRAVENOUS at 08:02

## 2025-02-08 RX ADMIN — ALLOPURINOL 150 MG: 100 TABLET ORAL at 08:02

## 2025-02-08 RX ADMIN — ASPIRIN 81 MG: 81 TABLET, COATED ORAL at 08:02

## 2025-02-08 RX ADMIN — APIXABAN 5 MG: 5 TABLET, FILM COATED ORAL at 08:02

## 2025-02-08 RX ADMIN — FUROSEMIDE 40 MG: 40 TABLET ORAL at 08:02

## 2025-02-08 RX ADMIN — SODIUM BICARBONATE 650 MG TABLET 1300 MG: at 08:02

## 2025-02-08 RX ADMIN — HEPARIN SODIUM AND DEXTROSE 12 UNITS/KG/HR: 10000; 5 INJECTION INTRAVENOUS at 04:02

## 2025-02-08 RX ADMIN — FUROSEMIDE 10 MG/HR: 10 INJECTION, SOLUTION INTRAMUSCULAR; INTRAVENOUS at 02:02

## 2025-02-08 NOTE — PROGRESS NOTES
[facility] Medicine  Progress Note    Patient Name: Jarrett Charlton Jr.  MRN: [id number]  Patient Class: IP- Inpatient   Admission Date: 2/3/2025  Length of Stay: 4 days  Attending Physician: Jamie Luu MD  Primary Care Provider: Tripp Mohan MD        Subjective     Principal Problem:Acute on chronic congestive heart failure        HPI:  74 y.o. male with a PMH of CAD s/p PCI to mid LAD in 2017, Hx of VT, Atrial tachycardia s/p AT ablation in 4/2024 and DVT on Eliquis, CHF with the EF of 35%, SBO, s/p colostomy, hypertension, hyperlipidemia, and DM2 presenting to the ED for worsening lower extremity swelling and abdominal pain. Patient was recently discharged from [facility] on 1/30 for workup of DVT, found to be superficial vein thrombosis. Hospital course c/b GRADY. Patient also found to have new pancreatic lesion with plans for outpatient ERCP. Patient states since discharge he has had worsening abdominal cramping and bloating. Notes decreased ostomy output. Was initially passing gas but states he has not been able to for the past 3 days. Denies fever, chills, N/V. Patient also reporting worsening BLE edema since discharge. States he was told to stop taking his home lasix due to his GRADY with plans to restart in on Monday, 2/10. Patient also notes 3 weeks of generalized pruritus that has progressively worsened.     Overview/Hospital Course:  Jarrett Charlton Jr. Was admitted to  with abdominal cramping and decreased ostomy output. Imaging negative for obstruction. Decreased output persists, but pt tolerating full diet without issue. BP remains intermittently soft, but improved once midodrine increased to home dose of 10 mg TID. Volume overload on exam, home lasix dose resumed for now. Caution diuresis with hypotension. AES consulted concerning ERCP --> recommend against ERCP, will follow up in clinic for further imaging. Derm consulted for scabies concern, determined dermatitis and  gave recs for pruritus, which is helping significantly. No evidence of DVT on repeat U/S. Hypotensive, given albumin X 2 with improvement. Continue midodrine. Persistent hypotension despite albumin infusions.     Interval History: Patient seen at bedside. Received 2 boluses of albumin for persistent hypotension with minimal results. Cardiology consulted to assist with BP/ hypervolemia. Endorses some feelings of hypotension. No respiratory issues.     Review of Systems   Constitutional:  Negative for chills and fever.   Respiratory:  Negative for chest tightness, shortness of breath and wheezing.    Cardiovascular:  Positive for leg swelling. Negative for chest pain.        BLE swelling   Gastrointestinal:  Negative for abdominal pain (cramping) and nausea.   Musculoskeletal:  Negative for arthralgias and back pain.   Neurological:  Positive for light-headedness. Negative for dizziness and weakness.   Psychiatric/Behavioral:  Negative for agitation and behavioral problems.      Objective:     Vital Signs (Most Recent):  Temp: 97.6 °F (36.4 °C) (02/08/25 0730)  Pulse: 73 (02/08/25 0730)  Resp: 16 (02/08/25 0730)  BP: (!) 80/55 (02/08/25 1104)  SpO2: 100 % (02/08/25 0730) Vital Signs (24h Range):  Temp:  [97.4 °F (36.3 °C)-98.3 °F (36.8 °C)] 97.6 °F (36.4 °C)  Pulse:  [68-73] 73  Resp:  [16-18] 16  SpO2:  [95 %-100 %] 100 %  BP: ()/(55-72) 80/55     Weight: 68 kg (150 lb)  Body mass index is 19.26 kg/m².    Intake/Output Summary (Last 24 hours) at 2/8/2025 1116  Last data filed at 2/8/2025 0315  Gross per 24 hour   Intake 916 ml   Output 255 ml   Net 661 ml         Physical Exam  Constitutional:       Appearance: Normal appearance.   HENT:      Head: Normocephalic and atraumatic.      Nose: Nose normal.   Eyes:      Pupils: Pupils are equal, round, and reactive to light.   Cardiovascular:      Rate and Rhythm: Normal rate and regular rhythm.   Pulmonary:      Effort: Pulmonary effort is normal.      Breath  "sounds: Rales (minimal) present. No wheezing.   Chest:      Chest wall: No tenderness.   Abdominal:      General: There is no distension.      Tenderness: There is no abdominal tenderness.      Comments: Ostomy with some stool output   Musculoskeletal:      Right lower leg: Edema (1+) present.      Left lower leg: Edema (1+) present.   Skin:     General: Skin is warm and dry.   Neurological:      General: No focal deficit present.      Mental Status: He is oriented to person, place, and time.   Psychiatric:         Mood and Affect: Mood normal.         Behavior: Behavior normal.             Significant Labs: All pertinent labs within the past 24 hours have been reviewed.  CBC:   Recent Labs   Lab 02/07/25  1001 02/08/25  0144   WBC 5.05 3.87*   HGB 10.4* 9.6*   HCT 34.4* 32.6*   * 105*     CMP:   Recent Labs   Lab 02/07/25  1001 02/08/25  0144    135*   K 4.8 4.8    103   CO2 26 22*    140*   BUN 57* 57*   CREATININE 2.6* 2.6*   CALCIUM 8.6* 8.2*   PROT 6.1 5.8*   ALBUMIN 3.1* 3.0*   BILITOT 0.8 0.6   ALKPHOS 138 146   AST 14 27   ALT 17 26   ANIONGAP 9 10       Significant Imaging: I have reviewed all pertinent imaging results/findings within the past 24 hours.    Assessment and Plan     * Acute on chronic congestive heart failure  Patient has Systolic (HFrEF) heart failure that is Acute. On presentation their CHF was decompensated. Evidence of decompensated CHF on presentation includes: edema. The etiology of their decompensation is likely medication non-compliance. Most recent BNP and echo results are listed below.  No results for input(s): "BNP" in the last 72 hours.    Latest ECHO  Results for orders placed during the hospital encounter of 01/24/25    Echo    Interpretation Summary    Left Ventricle: The left ventricle is mildly dilated. Ventricular mass is normal. Normal wall thickness. Severe global hypokinesis present. There is severely reduced systolic function. Ejection fraction " "is approximately 30%. Grade I diastolic dysfunction.    Right Ventricle: Normal right ventricular cavity size. Wall thickness is normal. Right ventricle wall motion has global hypokinesis. Systolic function is moderately reduced.    Left Atrium: Left atrium is severely dilated.    Right Atrium: Right atrium is moderately dilated.    Aortic Valve: The aortic valve is a trileaflet valve. There is mild aortic valve sclerosis. There is mild aortic regurgitation with a centrally directed jet.    Mitral Valve: There is severe regurgitation with a centrally directed jet.    Tricuspid Valve: There is moderate to severe regurgitation with a centrally directed jet.    Pulmonary Artery: There is mild to moderate pulmonary hypertension. The estimated pulmonary artery systolic pressure is 42 mmHg.    IVC/SVC: Elevated venous pressure at 15 mmHg.    Pericardium: There is a small circumferential effusion. Left pleural effusion.    Current Heart Failure Medications  furosemide tablet 40 mg, Daily, Oral  metoprolol succinate (TOPROL-XL) 24 hr tablet 25 mg, Daily, Oral    Plan  - Monitor strict I&Os and daily weights.    - Place on telemetry  - Low sodium diet  - Place on fluid restriction of 1 L.   - prior to admission patient was told to HOLD lasix due to GRADY. Presents with LE edema, JVD, rales BLL, pleural effusion on imaging.   - lasix40 mg daily resumed. Considered increasing dosage, however cautious with hypotension this AM.         Atelectasis  I have personally reviewed Chest X-ray. Patient with atelectasis on interpretation. Likely  compressive atelectasis . Signs and symptoms include Shortness of breath Will begin treatment with incentive spirometry and upright positioning.    Pancreatic mass  CT abd w contrast reviewed: "There is a subtle low attenuating lesion at the pancreatic head measuring 1.1 cm, stable noting fatty atrophy of the pancreas without pancreatic ductal dilation."  - AES consulted, recommend against ERCP " "at this time. Plan for follow up in clinic with imaging.   - referral to [facility] at [place]      History of DVT (deep vein thrombosis)  C/W elequis  Recheck U/S - negative for DVT. Prior known L greater saphenous vein nonocclusive thrombus resolved.       Pulmonary hypertension  - diuresis as above      Ileostomy in place  Pt reporting decreased ostomy output, reports no flatulence in past 3 days. CT abdomen performed and reviewed: "Surgical change of colectomy, noting some slow flow proximal to the ostomy without obstruction."  - pt tolerating full diet. However, if change in PO intake would consider further evaluation.   - continue close monitoring of bowel function and ostomy   - now having normal ostomy output      Hypotension  - 2/6: BP 80/54 early this morning. Asymptomatic. Given home midodrine with improvement.   --> BP improved with , OK to continue lasix and BB  - 2/7: BP 88/54; given albumin X 2 with improvement. Continue midodrine, hold metoprolol   - continue to monitor closely while continuing daily diuresis   - continue midodrine 10 mg TID , patient reports taking 25 mg midodrine intermittently at home  - 2/8: multiple doses of albumin with continued hypotension. Cardiology consulted with appreciation       History of ulcerative colitis  Stable      Chronic renal impairment, stage 4 (severe)  Creatine stable for now. BMP reviewed- noted Estimated Creatinine Clearance: 23.6 mL/min (A) (based on SCr of 2.6 mg/dL (H)). according to latest data. Based on current GFR, CKD stage is stage 4 - GFR 15-29.  Monitor UOP and serial BMP and adjust therapy as needed. Renally dose meds. Avoid nephrotoxic medications and procedures.  - baseline 2.5- 3      VTE Risk Mitigation (From admission, onward)           Ordered     apixaban tablet 5 mg  2 times daily         02/03/25 1421                    Discharge Planning   JOSÉ MIGUEL: 2/10/2025     Code Status: Full Code   Medical Readiness for Discharge Date:   Discharge Plan A: " Home Health   Discharge Delays: None known at this time                    Keesha Harris PA-C  Department of Hospital Medicine   [facility] - Acute Medical Stepdown

## 2025-02-08 NOTE — ASSESSMENT & PLAN NOTE
"CT abd w contrast reviewed: "There is a subtle low attenuating lesion at the pancreatic head measuring 1.1 cm, stable noting fatty atrophy of the pancreas without pancreatic ductal dilation."  - AES consulted, recommend against ERCP at this time. Plan for follow up in clinic with imaging.   - referral to [facility] at [place]  "

## 2025-02-08 NOTE — CONSULTS
[facility]  Cardiology  Consult Note    Patient Name: Jarrett Charlton Jr.  MRN: [id number]  Admission Date: 2/3/2025  Hospital Length of Stay: 4 days  Code Status: Full Code   Attending Provider: Russell Gamboa MD   Consulting Provider: John Paul Sylvester MD  Primary Care Physician: Tripp Mohan MD  Principal Problem:Acute on chronic congestive heart failure    Patient information was obtained from patient and ER records.     Inpatient consult to Cardiology  Consult performed by: John Paul Sylvester MD  Consult ordered by: Jamie Luu MD  Reason for consult: ADHF        Subjective:     Chief Complaint:  SOB and edema     HPI:   Mr. Charlton is a 74 year old man with CAD s/p PCI (mLAD 2017), hx VT, atrial tach s/p ablation (4/2024), paroxysmal afib, HFrEF, mitral and tricuspid regurgitation, hx DVT on eliquis, Chron's disease s/p ileostomy, CKD4, T2DM, orthostatic hypotension,  HTN, and HLD admitted with volume overload    Cardiology consulted for hypotension restricting diuresis.     Patient admitted on 2/3 for decreased ostomy output and worsening LE edema. He was recently admitted at [facility] (1/24-1/30) with LLE sphenous vein non-occlusive DVT and had an GRADY for which his home lasix was supposed to be held until 2/10. He was noted to have a new pancreatic lesion and is planned to follow with [facility] outpatient. Patient has been hypotensive with SBP as low as 80's over the last 72 hrs. He received fluid earlier in this admission, but was later started on midodrine 10mg TID. He received albumin on 2/7 and 2/8 for ongoing hypotension. In terms of diuretics, patient received lasix 40mg IV on 2/3 X2, lasix 40mg IV on 2/4 X1, and then has been of lasix 40mg po daily since. Urine output and daily weight have not been accurately tracked. Labs are notable for microcytic, hypochromic anemia, mild throbmocytopenia (plt 105), Cr 2.6 (b/l around 2.5), albumin < 3 prior to albumin infusions, BNP  2.7k (b/l > 2k though is trending higher) on admission. 1/24/25TTE notable for possible progression from moderate to severe MR, and mild-moderate to moderate to severe TR. CXR with small L pleural effusion and mild pulm edema per my interpretation. ECG at baseline with RBBB.     At bedside patient reports significant SOB and fatigue.     Past Medical History:   Diagnosis Date    Anticoagulant long-term use     Basal cell carcinoma     BPH (benign prostatic hyperplasia)     s/p TURP    Carotid stenosis     Chronic kidney disease     Claudication     DDD (degenerative disc disease) 10/21/2013    Diabetes mellitus with renal complications     Disc disease, degenerative, cervical     DVT (deep venous thrombosis)     Encounter for blood transfusion     GERD (gastroesophageal reflux disease)     Gout, chronic     History of ulcerative colitis     s/p colectomy and ileostomy    HLD (hyperlipidemia)     HTN (hypertension)     Ileostomy in place 1982    Paroxysmal atrial fibrillation     RBBB     Squamous cell carcinoma 03/08/2018    Left superior helix near insertion    Squamous cell carcinoma 04/12/2018    Left forearm x 5    Syncope 07/06/2024    Ventricular tachycardia        Past Surgical History:   Procedure Laterality Date    ABLATION, SVT, ACCESSORY PATHWAY N/A 4/30/2024    Procedure: Ablation, SVT, Accessory Pathway;  Surgeon: Franky Taylor MD;  Location: [facility] EP LAB;  Service: Cardiology;  Laterality: N/A;  VT, RFA, Carto, MAC, GP, 3 Prep *MDT ILR in situ*    cardiac stents      CATARACT EXTRACTION Bilateral     CERVICAL FUSION      CHOLECYSTECTOMY N/A 2/14/2023    Procedure: CHOLECYSTECTOMY;  Surgeon: Mike Joyce MD;  Location: [facility] OR;  Service: General;  Laterality: N/A;  very high probabilty of converison to open    colectomy and ileostomy  1985    ENDOSCOPIC ULTRASOUND OF UPPER GASTROINTESTINAL TRACT N/A 2/10/2023    Procedure: ULTRASOUND, UPPER GI TRACT, ENDOSCOPIC;  Surgeon: Poli Fuller MD;   Location: [facility] ENDO;  Service: Endoscopy;  Laterality: N/A;    ERCP N/A 2/10/2023    Procedure: ERCP (ENDOSCOPIC RETROGRADE CHOLANGIOPANCREATOGRAPHY);  Surgeon: Poli Fuller MD;  Location: [facility] ENDO;  Service: Endoscopy;  Laterality: N/A;    EXCISION OF PAROTID GLAND Left 12/18/2020    Procedure: EXCISION, PAROTID GLAND;  Surgeon: Michael Pinzon MD;  Location: [facility] OR 2ND FLR;  Service: ENT;  Laterality: Left;    INSERTION OF IMPLANTABLE LOOP RECORDER  06/07/2021    INSERTION OF IMPLANTABLE LOOP RECORDER Left 6/7/2021    Procedure: INSERTION, IMPLANTABLE LOOP RECORDER;  Surgeon: Romario Dao MD;  Location: [facility] CATH LAB;  Service: Cardiology;  Laterality: Left;    LEFT HEART CATHETERIZATION Right 4/15/2021    Procedure: CATHETERIZATION, HEART, LEFT;  Surgeon: Marcio Jones MD;  Location: [facility] CATH LAB;  Service: Cardiology;  Laterality: Right;    LYSIS OF ADHESIONS N/A 11/9/2020    Procedure: LYSIS, ADHESIONS,  ERAS low;  Surgeon: CED Haley MD;  Location: [facility] OR 2ND FLR;  Service: Colon and Rectal;  Laterality: N/A;    LYSIS OF ADHESIONS N/A 2/14/2023    Procedure: LYSIS, ADHESIONS;  Surgeon: Mike Joyce MD;  Location: [facility] OR;  Service: General;  Laterality: N/A;    POUCHOSCOPY N/A 4/6/2022    Procedure: ENDOSCOPY, POUCH, SMALL INTESTINE, DIAGNOSTIC;  Surgeon: Dieter Juarez MD;  Location: [facility] ENDO (2ND FLR);  Service: Endoscopy;  Laterality: N/A;    REPAIR, HERNIA, PARASTOMAL N/A 11/9/2020    Procedure: REPAIR, HERNIA, PARASTOMAL;  Surgeon: CED Haley MD;  Location: [facility] OR 2ND FLR;  Service: Colon and Rectal;  Laterality: N/A;    TRANSURETHRAL RESECTION OF PROSTATE (TURP) WITHOUT USE OF LASER N/A 1/23/2019    Procedure: TURP, WITHOUT USING LASER BIPOLAR;  Surgeon: Catarino Mtoa MD;  Location: [facility] OR 1ST FLR;  Service: Urology;  Laterality: N/A;  1.5 HOURS    TREATMENT OF CARDIAC ARRHYTHMIA  4/30/2024    Procedure: Cardioversion or Defibrillation;  Surgeon: Claudia  Franky MAHAN MD;  Location: [facility] EP LAB;  Service: Cardiology;;       Review of patient's allergies indicates:   Allergen Reactions    Atorvastatin     Rosuvastatin        Current Facility-Administered Medications on File Prior to Encounter   Medication    sodium chloride 0.9% in 1,000 mL infusion     Current Outpatient Medications on File Prior to Encounter   Medication Sig    allopurinoL (ZYLOPRIM) 100 MG tablet Take 4 tablets (400 mg total) by mouth once daily. (Patient taking differently: Take 150 mg by mouth every other day.)    amiodarone (PACERONE) 200 MG Tab Take 1 tablet (200 mg total) by mouth 2 (two) times daily.    antiox.mv no.10/omeg3s/lut/gilma (I-CAPS ORAL) Take 1 capsule by mouth once daily.    apixaban (ELIQUIS) 5 mg Tab Take 1 tablet (5 mg total) by mouth 2 (two) times daily.    brimonidine 0.2% (ALPHAGAN) 0.2 % Drop Place 1 drop into both eyes 2 (two) times a day.    colchicine (COLCRYS) 0.6 mg tablet Take 1 tablet (0.6 mg total) by mouth every other day.    docusate sodium 100 mg capsule Take 100 mg by mouth 2 (two) times daily as needed for Constipation.    ferrous sulfate (IRON) 325 mg (65 mg iron) Tab tablet Take 1 tablet (325 mg total) by mouth every other day.    furosemide (LASIX) 40 MG tablet Hold over the weekend and starting on Monday, February 3rd start taking one tablet by mouth every other day.    glimepiride (AMARYL) 4 MG tablet TAKE 1 TABLET TWICE DAILY BEFORE MEALS    metoprolol succinate (TOPROL-XL) 25 MG 24 hr tablet Take 1 tablet (25 mg total) by mouth once daily.    midodrine (PROAMATINE) 2.5 MG Tab Take 1 tablet (2.5 mg total) by mouth 2 (two) times daily with meals.    pantoprazole (PROTONIX) 40 MG tablet TAKE 1 TABLET ONE TIME DAILY    pravastatin (PRAVACHOL) 40 MG tablet Take 1 tablet (40 mg total) by mouth once daily.    predniSONE (DELTASONE) 2.5 MG tablet Take 2.5 mg by mouth every other day.    sodium bicarbonate 650 MG tablet Take 2 tablets (1,300 mg total) by mouth 2  (two) times daily.    tamsulosin (FLOMAX) 0.4 mg Cap Take 1 capsule (0.4 mg total) by mouth once daily.    ACCU-CHEK PAUL CONTROL SOLN Soln 1 drop by NOT APPLICABLE route once daily.    ACCU-CHEK SOFTCLIX LANCETS [facility] [place], [place] TEST BLOOD SUGAR THREE TIMES DAILY    alcohol swabs (DROPSAFE ALCOHOL PREP PADS) PadM Apply 1 each topically once daily.    aspirin (ECOTRIN) 81 MG EC tablet Take 1 tablet (81 mg total) by mouth once daily. (Patient not taking: Reported on 2/3/2025)    blood sugar diagnostic (ACCU-CHEK GUIDE TEST STRIPS) Strp 1 each by Other route 3 (three) times daily. Test Blood Sugar    blood-glucose meter (ACCU-CHEK GUIDE GLUCOSE METER) [facility] [place] [place] Accucheck BID     Family History       Problem Relation (Age of Onset)    Cancer Father    Dementia Mother    Diabetes Father    Heart disease Father          Tobacco Use    Smoking status: Never     Passive exposure: Never    Smokeless tobacco: Never   Substance and Sexual Activity    Alcohol use: No    Drug use: No    Sexual activity: Not Currently     Partners: Female     Review of Systems   Constitutional: Positive for malaise/fatigue.   Cardiovascular:  Positive for dyspnea on exertion and leg swelling. Negative for chest pain.   Respiratory:  Positive for shortness of breath. Negative for sputum production and wheezing.      Objective:     Vital Signs (Most Recent):  Temp: 97.6 °F (36.4 °C) (02/08/25 0730)  Pulse: 73 (02/08/25 0730)  Resp: 16 (02/08/25 0730)  BP: (!) 80/55 (02/08/25 1104)  SpO2: 100 % (02/08/25 0730) Vital Signs (24h Range):  Temp:  [97.4 °F (36.3 °C)-98.3 °F (36.8 °C)] 97.6 °F (36.4 °C)  Pulse:  [68-73] 73  Resp:  [16-18] 16  SpO2:  [95 %-100 %] 100 %  BP: ()/(55-72) 80/55     Weight: 68 kg (150 lb)  Body mass index is 19.26 kg/m².    SpO2: 100 %         Intake/Output Summary (Last 24 hours) at 2/8/2025 1215  Last data filed at 2/8/2025 0315  Gross per 24 hour   Intake 916 ml   Output 255 ml   Net 661 ml       Lines/Drains/Airways       Drain   "Duration                  Ileostomy 01/01/84 RUQ 97538 days              Peripheral Intravenous Line  Duration                  Peripheral IV - Single Lumen 02/03/25 1034 20 G Posterior;Right Forearm 5 days                     Physical Exam  Vitals reviewed.   Constitutional:       Appearance: He is underweight. He is ill-appearing.   Eyes:      General: No scleral icterus.  Neck:      Vascular: JVD present.   Cardiovascular:      Rate and Rhythm: Normal rate and regular rhythm.      Pulses: Normal pulses.      Heart sounds: Murmur heard.      Medium-pitched holosystolic murmur is present with a grade of 3/6 at the apex.      Gallop present. S3 and S4 sounds present.   Pulmonary:      Effort: Tachypnea and accessory muscle usage present.      Comments: Crackles up through the mid lungs. No wheezes  Abdominal:      General: There is no distension.      Palpations: Abdomen is soft.   Musculoskeletal:      Right lower leg: Edema present.      Left lower leg: Edema present.   Skin:     General: Skin is warm and dry.      Coloration: Skin is pale.   Neurological:      Mental Status: He is easily aroused.          Significant Labs: ABG: No results for input(s): "PH", "PCO2", "HCO3", "POCSATURATED", "BE" in the last 48 hours., CMP   Recent Labs   Lab 02/07/25  1001 02/08/25  0144    135*   K 4.8 4.8    103   CO2 26 22*    140*   BUN 57* 57*   CREATININE 2.6* 2.6*   CALCIUM 8.6* 8.2*   PROT 6.1 5.8*   ALBUMIN 3.1* 3.0*   BILITOT 0.8 0.6   ALKPHOS 138 146   AST 14 27   ALT 17 26   ANIONGAP 9 10   , CBC   Recent Labs   Lab 02/07/25  1001 02/08/25  0144   WBC 5.05 3.87*   HGB 10.4* 9.6*   HCT 34.4* 32.6*   * 105*   , INR No results for input(s): "INR", "PROTIME" in the last 48 hours., Lipid Panel No results for input(s): "CHOL", "HDL", "LDLCALC", "TRIG", "CHOLHDL" in the last 48 hours., Troponin No results for input(s): "TROPONINIHS" in the last 48 hours., and All pertinent lab results from the last " 24 hours have been reviewed.    Significant Imaging: Echocardiogram: Transthoracic echo (TTE) complete (Cupid Only):   Results for orders placed or performed during the hospital encounter of 01/24/25   Echo   Result Value Ref Range    BSA 1.95 m2    LVOT stroke volume 51.9 cm3    LVIDd 5.9 3.5 - 6.0 cm    LV Systolic Volume 131.90 mL    LV Systolic Volume Index 66.6 mL/m2    LVIDs 5.2 (A) 2.1 - 4.0 cm    LV Diastolic Volume 171.94 mL    LV Diastolic Volume Index 86.84 mL/m2    Left Ventricular End Systolic Volume by Teichholz Method 131.90 mL    Left Ventricular End Diastolic Volume by Teichholz Method 171.94 mL    IVS 0.9 0.6 - 1.1 cm    LVOT diameter 2.3 cm    LVOT area 4.2 cm2    FS 11.9 (A) 28 - 44 %    Left Ventricle Relative Wall Thickness 0.27 cm    PW 0.8 0.6 - 1.1 cm    LV mass 195.0 g    LV Mass Index 98.5 g/m2    MV Peak E Joao 0.94 m/s    TR Max Joao 2.6 m/s    IVRT 46 msec    E wave deceleration time 132 msec    PV Peak S Joao 0.61 m/s    LVOT peak joao 0.8 m/s    Left Ventricular Outflow Tract Mean Velocity 0.58 cm/s    Left Ventricular Outflow Tract Mean Gradient 1.45 mmHg    LA size 4.6 cm    Left Atrium Minor Axis 6.6 cm    Left Atrium Major Axis 7.5 cm    RA Major Axis 5.91 cm    AV mean gradient 2 mmHg    AV peak gradient 3 mmHg    Ao peak joao 0.8 m/s    Ao VTI 10.4 cm    LVOT peak VTI 12.5 cm    AV valve area 5.0 cm²    AV Velocity Ratio 1.00     AV index (prosthetic) 1.20     KJ by Velocity Ratio 4.2 cm²    Mr max joao 4.48 m/s    MV stenosis pressure 1/2 time 38.15 ms    MV valve area p 1/2 method 5.77 cm2    Triscuspid Valve Regurgitation Peak Gradient 27 mmHg    PV PEAK VELOCITY 0.58 m/s    PV peak gradient 1 mmHg    IVC diameter 2.60 cm    ZLVIDS 3.10     ZLVIDD 0.32     JAKI 67 mL/m2    LA Vol 132 cm3    LA WIDTH 4.8 cm    RA Width 5.2 cm    TV resting pulmonary artery pressure 42 mmHg    RV TB RVSP 18 mmHg    Est. RA pres 15 mmHg    EF 30 %    Narrative      Left Ventricle: The left ventricle  is mildly dilated. Ventricular mass   is normal. Normal wall thickness. Severe global hypokinesis present. There   is severely reduced systolic function. Ejection fraction is approximately   30%. Grade I diastolic dysfunction.    Right Ventricle: Normal right ventricular cavity size. Wall thickness   is normal. Right ventricle wall motion has global hypokinesis. Systolic   function is moderately reduced.    Left Atrium: Left atrium is severely dilated.    Right Atrium: Right atrium is moderately dilated.    Aortic Valve: The aortic valve is a trileaflet valve. There is mild   aortic valve sclerosis. There is mild aortic regurgitation with a   centrally directed jet.    Mitral Valve: There is severe regurgitation with a centrally directed   jet.    Tricuspid Valve: There is moderate to severe regurgitation with a   centrally directed jet.    Pulmonary Artery: There is mild to moderate pulmonary hypertension. The   estimated pulmonary artery systolic pressure is 42 mmHg.    IVC/SVC: Elevated venous pressure at 15 mmHg.    Pericardium: There is a small circumferential effusion. Left pleural   effusion.       Assessment and Plan:     * Acute on chronic congestive heart failure  Patient admitted with acute on chronic heart failure exacerbation complicated by hypotension limiting diuresis. Has received fluid, albumin, and midodrine for BP support. Minimal charting of UOP and weight trend. TR and MR worse on most recent TTE though possibly due to volume overload rather than progression of disease and may be reversible. If remains severe MR and clinically improves may be candidate for cherie-clip. At this time, patient requires aggressive diuresis given deteriorating clinical status which is likely to require inotrope or vasopressor support given BP.        Current Heart Failure Medications  furosemide tablet 40 mg, Daily, Oral  metoprolol succinate (TOPROL-XL) 24 hr split tablet 12.5 mg, Daily, Oral    Plan  - Monitor  strict I&Os and daily weights.    - Place on telemetry  - Low sodium diet  - Place on fluid restriction of 1.5 L.   - cardiology consulted and will transfer patient to CCU  [ ] STAT lactate  [ ] stop lasix 40 po and metoprolol po  [ ] plan for IV lasix 100mg X1 when in the unit (+/- central line and vasopressors/inotropes for BP support)          VTE Risk Mitigation (From admission, onward)           Ordered     apixaban tablet 5 mg  2 times daily         02/03/25 3884                    Thank you for your consult. I will sign off. Please contact us if you have any additional questions.    John Paul Sylvester MD  Cardiology   [facility] - Acute Medical Stepdown

## 2025-02-08 NOTE — SUBJECTIVE & OBJECTIVE
Interval History: Patient seen at bedside. Received 2 boluses of albumin for persistent hypotension with minimal results. Cardiology consulted to assist with BP/ hypervolemia. Endorses some feelings of hypotension. No respiratory issues.     Review of Systems   Constitutional:  Negative for chills and fever.   Respiratory:  Negative for chest tightness, shortness of breath and wheezing.    Cardiovascular:  Positive for leg swelling. Negative for chest pain.        BLE swelling   Gastrointestinal:  Negative for abdominal pain (cramping) and nausea.   Musculoskeletal:  Negative for arthralgias and back pain.   Neurological:  Positive for light-headedness. Negative for dizziness and weakness.   Psychiatric/Behavioral:  Negative for agitation and behavioral problems.      Objective:     Vital Signs (Most Recent):  Temp: 97.6 °F (36.4 °C) (02/08/25 0730)  Pulse: 73 (02/08/25 0730)  Resp: 16 (02/08/25 0730)  BP: (!) 80/55 (02/08/25 1104)  SpO2: 100 % (02/08/25 0730) Vital Signs (24h Range):  Temp:  [97.4 °F (36.3 °C)-98.3 °F (36.8 °C)] 97.6 °F (36.4 °C)  Pulse:  [68-73] 73  Resp:  [16-18] 16  SpO2:  [95 %-100 %] 100 %  BP: ()/(55-72) 80/55     Weight: 68 kg (150 lb)  Body mass index is 19.26 kg/m².    Intake/Output Summary (Last 24 hours) at 2/8/2025 1116  Last data filed at 2/8/2025 0315  Gross per 24 hour   Intake 916 ml   Output 255 ml   Net 661 ml         Physical Exam  Constitutional:       Appearance: Normal appearance.   HENT:      Head: Normocephalic and atraumatic.      Nose: Nose normal.   Eyes:      Pupils: Pupils are equal, round, and reactive to light.   Cardiovascular:      Rate and Rhythm: Normal rate and regular rhythm.   Pulmonary:      Effort: Pulmonary effort is normal.      Breath sounds: Rales (minimal) present. No wheezing.   Chest:      Chest wall: No tenderness.   Abdominal:      General: There is no distension.      Tenderness: There is no abdominal tenderness.      Comments: Ostomy with  some stool output   Musculoskeletal:      Right lower leg: Edema (1+) present.      Left lower leg: Edema (1+) present.   Skin:     General: Skin is warm and dry.   Neurological:      General: No focal deficit present.      Mental Status: He is oriented to person, place, and time.   Psychiatric:         Mood and Affect: Mood normal.         Behavior: Behavior normal.             Significant Labs: All pertinent labs within the past 24 hours have been reviewed.  CBC:   Recent Labs   Lab 02/07/25  1001 02/08/25  0144   WBC 5.05 3.87*   HGB 10.4* 9.6*   HCT 34.4* 32.6*   * 105*     CMP:   Recent Labs   Lab 02/07/25  1001 02/08/25  0144    135*   K 4.8 4.8    103   CO2 26 22*    140*   BUN 57* 57*   CREATININE 2.6* 2.6*   CALCIUM 8.6* 8.2*   PROT 6.1 5.8*   ALBUMIN 3.1* 3.0*   BILITOT 0.8 0.6   ALKPHOS 138 146   AST 14 27   ALT 17 26   ANIONGAP 9 10       Significant Imaging: I have reviewed all pertinent imaging results/findings within the past 24 hours.

## 2025-02-08 NOTE — SUBJECTIVE & OBJECTIVE
Past Medical History:   Diagnosis Date    Anticoagulant long-term use     Basal cell carcinoma     BPH (benign prostatic hyperplasia)     s/p TURP    Carotid stenosis     Chronic kidney disease     Claudication     DDD (degenerative disc disease) 10/21/2013    Diabetes mellitus with renal complications     Disc disease, degenerative, cervical     DVT (deep venous thrombosis)     Encounter for blood transfusion     GERD (gastroesophageal reflux disease)     Gout, chronic     History of ulcerative colitis     s/p colectomy and ileostomy    HLD (hyperlipidemia)     HTN (hypertension)     Ileostomy in place 1982    Paroxysmal atrial fibrillation     RBBB     Squamous cell carcinoma 03/08/2018    Left superior helix near insertion    Squamous cell carcinoma 04/12/2018    Left forearm x 5    Syncope 07/06/2024    Ventricular tachycardia        Past Surgical History:   Procedure Laterality Date    ABLATION, SVT, ACCESSORY PATHWAY N/A 4/30/2024    Procedure: Ablation, SVT, Accessory Pathway;  Surgeon: Franky Taylor MD;  Location: [facility] EP LAB;  Service: Cardiology;  Laterality: N/A;  VT, RFA, Carto, MAC, GP, 3 Prep *MDT ILR in situ*    cardiac stents      CATARACT EXTRACTION Bilateral     CERVICAL FUSION      CHOLECYSTECTOMY N/A 2/14/2023    Procedure: CHOLECYSTECTOMY;  Surgeon: Mike Joyce MD;  Location: [facility] OR;  Service: General;  Laterality: N/A;  very high probabilty of converison to open    colectomy and ileostomy  1985    ENDOSCOPIC ULTRASOUND OF UPPER GASTROINTESTINAL TRACT N/A 2/10/2023    Procedure: ULTRASOUND, UPPER GI TRACT, ENDOSCOPIC;  Surgeon: Poli Fuller MD;  Location: [facility] ENDO;  Service: Endoscopy;  Laterality: N/A;    ERCP N/A 2/10/2023    Procedure: ERCP (ENDOSCOPIC RETROGRADE CHOLANGIOPANCREATOGRAPHY);  Surgeon: Poli Fuller MD;  Location: [facility] ENDO;  Service: Endoscopy;  Laterality: N/A;    EXCISION OF PAROTID GLAND Left 12/18/2020    Procedure: EXCISION, PAROTID GLAND;  Surgeon: Michael  BETTY Pinzon MD;  Location: [facility] OR 2ND FLR;  Service: ENT;  Laterality: Left;    INSERTION OF IMPLANTABLE LOOP RECORDER  06/07/2021    INSERTION OF IMPLANTABLE LOOP RECORDER Left 6/7/2021    Procedure: INSERTION, IMPLANTABLE LOOP RECORDER;  Surgeon: Romario Dao MD;  Location: [facility] CATH LAB;  Service: Cardiology;  Laterality: Left;    LEFT HEART CATHETERIZATION Right 4/15/2021    Procedure: CATHETERIZATION, HEART, LEFT;  Surgeon: Marcio Jones MD;  Location: [facility] CATH LAB;  Service: Cardiology;  Laterality: Right;    LYSIS OF ADHESIONS N/A 11/9/2020    Procedure: LYSIS, ADHESIONS,  ERAS low;  Surgeon: CED Haley MD;  Location: [facility] OR 2ND FLR;  Service: Colon and Rectal;  Laterality: N/A;    LYSIS OF ADHESIONS N/A 2/14/2023    Procedure: LYSIS, ADHESIONS;  Surgeon: Mike Joyce MD;  Location: [facility];  Service: General;  Laterality: N/A;    POUCHOSCOPY N/A 4/6/2022    Procedure: ENDOSCOPY, POUCH, SMALL INTESTINE, DIAGNOSTIC;  Surgeon: Dieter Juarez MD;  Location: [facility] ENDO (2ND FLR);  Service: Endoscopy;  Laterality: N/A;    REPAIR, HERNIA, PARASTOMAL N/A 11/9/2020    Procedure: REPAIR, HERNIA, PARASTOMAL;  Surgeon: CED Haley MD;  Location: [facility] OR 2ND FLR;  Service: Colon and Rectal;  Laterality: N/A;    TRANSURETHRAL RESECTION OF PROSTATE (TURP) WITHOUT USE OF LASER N/A 1/23/2019    Procedure: TURP, WITHOUT USING LASER BIPOLAR;  Surgeon: Catarino Mota MD;  Location: [facility] OR 1ST FLR;  Service: Urology;  Laterality: N/A;  1.5 HOURS    TREATMENT OF CARDIAC ARRHYTHMIA  4/30/2024    Procedure: Cardioversion or Defibrillation;  Surgeon: Franky Taylor MD;  Location: [facility] EP LAB;  Service: Cardiology;;       Review of patient's allergies indicates:   Allergen Reactions    Atorvastatin     Rosuvastatin        Current Facility-Administered Medications on File Prior to Encounter   Medication    sodium chloride 0.9% in 1,000 mL infusion     Current Outpatient Medications on File Prior  to Encounter   Medication Sig    allopurinoL (ZYLOPRIM) 100 MG tablet Take 4 tablets (400 mg total) by mouth once daily. (Patient taking differently: Take 150 mg by mouth every other day.)    amiodarone (PACERONE) 200 MG Tab Take 1 tablet (200 mg total) by mouth 2 (two) times daily.    antiox.mv no.10/omeg3s/lut/gilma (I-CAPS ORAL) Take 1 capsule by mouth once daily.    apixaban (ELIQUIS) 5 mg Tab Take 1 tablet (5 mg total) by mouth 2 (two) times daily.    brimonidine 0.2% (ALPHAGAN) 0.2 % Drop Place 1 drop into both eyes 2 (two) times a day.    colchicine (COLCRYS) 0.6 mg tablet Take 1 tablet (0.6 mg total) by mouth every other day.    docusate sodium 100 mg capsule Take 100 mg by mouth 2 (two) times daily as needed for Constipation.    ferrous sulfate (IRON) 325 mg (65 mg iron) Tab tablet Take 1 tablet (325 mg total) by mouth every other day.    furosemide (LASIX) 40 MG tablet Hold over the weekend and starting on Monday, February 3rd start taking one tablet by mouth every other day.    glimepiride (AMARYL) 4 MG tablet TAKE 1 TABLET TWICE DAILY BEFORE MEALS    metoprolol succinate (TOPROL-XL) 25 MG 24 hr tablet Take 1 tablet (25 mg total) by mouth once daily.    midodrine (PROAMATINE) 2.5 MG Tab Take 1 tablet (2.5 mg total) by mouth 2 (two) times daily with meals.    pantoprazole (PROTONIX) 40 MG tablet TAKE 1 TABLET ONE TIME DAILY    pravastatin (PRAVACHOL) 40 MG tablet Take 1 tablet (40 mg total) by mouth once daily.    predniSONE (DELTASONE) 2.5 MG tablet Take 2.5 mg by mouth every other day.    sodium bicarbonate 650 MG tablet Take 2 tablets (1,300 mg total) by mouth 2 (two) times daily.    tamsulosin (FLOMAX) 0.4 mg Cap Take 1 capsule (0.4 mg total) by mouth once daily.    ACCU-CHEK PAUL CONTROL SOLN Soln 1 drop by NOT APPLICABLE route once daily.    ACCU-CHEK SOFTCLIX LANCETS [facility] TEST BLOOD SUGAR THREE TIMES DAILY    alcohol swabs (DROPSAFE ALCOHOL PREP PADS) PadM Apply 1 each topically once daily.     aspirin (ECOTRIN) 81 MG EC tablet Take 1 tablet (81 mg total) by mouth once daily. (Patient not taking: Reported on 2/3/2025)    blood sugar diagnostic (ACCU-CHEK GUIDE TEST STRIPS) Strp 1 each by Other route 3 (three) times daily. Test Blood Sugar    blood-glucose meter (ACCU-CHEK GUIDE GLUCOSE METER) Misc Accucheck BID     Family History       Problem Relation (Age of Onset)    Cancer Father    Dementia Mother    Diabetes Father    Heart disease Father          Tobacco Use    Smoking status: Never     Passive exposure: Never    Smokeless tobacco: Never   Substance and Sexual Activity    Alcohol use: No    Drug use: No    Sexual activity: Not Currently     Partners: Female     Review of Systems   Constitutional: Positive for malaise/fatigue. Negative for chills, diaphoresis and night sweats.   Cardiovascular:  Positive for dyspnea on exertion, irregular heartbeat and leg swelling. Negative for chest pain, near-syncope and palpitations.   Respiratory:  Positive for cough and shortness of breath. Negative for wheezing.    Skin:  Negative for color change and dry skin.   Musculoskeletal:  Negative for joint pain and joint swelling.   Gastrointestinal:  Negative for abdominal pain, diarrhea, nausea and vomiting.   Genitourinary:  Negative for dysuria.   Neurological:  Positive for dizziness and weakness. Negative for headaches and light-headedness.   All other systems reviewed and are negative.    Objective:     Vital Signs (Most Recent):  Temp: 96.7 °F (35.9 °C) (02/08/25 1227)  Pulse: 68 (02/08/25 1227)  Resp: (!) 25 (02/08/25 1227)  BP: (!) 92/52 (02/08/25 1227)  SpO2: 100 % (02/08/25 1227) Vital Signs (24h Range):  Temp:  [96.7 °F (35.9 °C)-98.3 °F (36.8 °C)] 96.7 °F (35.9 °C)  Pulse:  [61-73] 68  Resp:  [16-25] 25  SpO2:  [95 %-100 %] 100 %  BP: ()/(52-72) 92/52     Weight: 68 kg (150 lb)  Body mass index is 19.26 kg/m².    SpO2: 100 %         Intake/Output Summary (Last 24 hours) at 2/8/2025 1324  Last data  filed at 2/8/2025 0900  Gross per 24 hour   Intake 1016 ml   Output 255 ml   Net 761 ml       Lines/Drains/Airways       Drain  Duration                  Ileostomy 01/01/84 RUQ 77352 days              Peripheral Intravenous Line  Duration                  Peripheral IV - Single Lumen 02/03/25 1034 20 G Posterior;Right Forearm 5 days                     Physical Exam  Vitals reviewed.   Constitutional:       Appearance: He is underweight. He is ill-appearing.   Eyes:      General: No scleral icterus.  Neck:      Vascular: JVD present.   Cardiovascular:      Rate and Rhythm: Normal rate and regular rhythm.      Pulses: Normal pulses.      Heart sounds: Murmur heard.      Medium-pitched holosystolic murmur is present with a grade of 3/6 at the apex.      Gallop present. S3 and S4 sounds present.   Pulmonary:      Effort: No tachypnea or accessory muscle usage.      Comments: Crackles up through the mid lungs. No wheezes  Abdominal:      General: There is no distension.      Palpations: Abdomen is soft.   Musculoskeletal:      Right lower leg: Edema present.      Left lower leg: Edema present.      Comments: Leg swelling R > L   Skin:     General: Skin is warm and dry.      Coloration: Skin is pale.   Neurological:      Mental Status: He is easily aroused.          Significant Labs: CMP   Recent Labs   Lab 02/07/25  1001 02/08/25  0144    135*   K 4.8 4.8    103   CO2 26 22*    140*   BUN 57* 57*   CREATININE 2.6* 2.6*   CALCIUM 8.6* 8.2*   PROT 6.1 5.8*   ALBUMIN 3.1* 3.0*   BILITOT 0.8 0.6   ALKPHOS 138 146   AST 14 27   ALT 17 26   ANIONGAP 9 10    and CBC   Recent Labs   Lab 02/07/25  1001 02/08/25  0144   WBC 5.05 3.87*   HGB 10.4* 9.6*   HCT 34.4* 32.6*   * 105*       Significant Imaging: Echocardiogram: Transthoracic echo (TTE) complete (Cupid Only):   Results for orders placed or performed during the hospital encounter of 01/24/25   Echo   Result Value Ref Range    BSA 1.95 m2    LVOT  stroke volume 51.9 cm3    LVIDd 5.9 3.5 - 6.0 cm    LV Systolic Volume 131.90 mL    LV Systolic Volume Index 66.6 mL/m2    LVIDs 5.2 (A) 2.1 - 4.0 cm    LV Diastolic Volume 171.94 mL    LV Diastolic Volume Index 86.84 mL/m2    Left Ventricular End Systolic Volume by Teichholz Method 131.90 mL    Left Ventricular End Diastolic Volume by Teichholz Method 171.94 mL    IVS 0.9 0.6 - 1.1 cm    LVOT diameter 2.3 cm    LVOT area 4.2 cm2    FS 11.9 (A) 28 - 44 %    Left Ventricle Relative Wall Thickness 0.27 cm    PW 0.8 0.6 - 1.1 cm    LV mass 195.0 g    LV Mass Index 98.5 g/m2    MV Peak E Joao 0.94 m/s    TR Max Joao 2.6 m/s    IVRT 46 msec    E wave deceleration time 132 msec    PV Peak S Joao 0.61 m/s    LVOT peak joao 0.8 m/s    Left Ventricular Outflow Tract Mean Velocity 0.58 cm/s    Left Ventricular Outflow Tract Mean Gradient 1.45 mmHg    LA size 4.6 cm    Left Atrium Minor Axis 6.6 cm    Left Atrium Major Axis 7.5 cm    RA Major Axis 5.91 cm    AV mean gradient 2 mmHg    AV peak gradient 3 mmHg    Ao peak joao 0.8 m/s    Ao VTI 10.4 cm    LVOT peak VTI 12.5 cm    AV valve area 5.0 cm²    AV Velocity Ratio 1.00     AV index (prosthetic) 1.20     KJ by Velocity Ratio 4.2 cm²    Mr max joao 4.48 m/s    MV stenosis pressure 1/2 time 38.15 ms    MV valve area p 1/2 method 5.77 cm2    Triscuspid Valve Regurgitation Peak Gradient 27 mmHg    PV PEAK VELOCITY 0.58 m/s    PV peak gradient 1 mmHg    IVC diameter 2.60 cm    ZLVIDS 3.10     ZLVIDD 0.32     JAKI 67 mL/m2    LA Vol 132 cm3    LA WIDTH 4.8 cm    RA Width 5.2 cm    TV resting pulmonary artery pressure 42 mmHg    RV TB RVSP 18 mmHg    Est. RA pres 15 mmHg    EF 30 %    Narrative      Left Ventricle: The left ventricle is mildly dilated. Ventricular mass   is normal. Normal wall thickness. Severe global hypokinesis present. There   is severely reduced systolic function. Ejection fraction is approximately   30%. Grade I diastolic dysfunction.    Right Ventricle: Normal  right ventricular cavity size. Wall thickness   is normal. Right ventricle wall motion has global hypokinesis. Systolic   function is moderately reduced.    Left Atrium: Left atrium is severely dilated.    Right Atrium: Right atrium is moderately dilated.    Aortic Valve: The aortic valve is a trileaflet valve. There is mild   aortic valve sclerosis. There is mild aortic regurgitation with a   centrally directed jet.    Mitral Valve: There is severe regurgitation with a centrally directed   jet.    Tricuspid Valve: There is moderate to severe regurgitation with a   centrally directed jet.    Pulmonary Artery: There is mild to moderate pulmonary hypertension. The   estimated pulmonary artery systolic pressure is 42 mmHg.    IVC/SVC: Elevated venous pressure at 15 mmHg.    Pericardium: There is a small circumferential effusion. Left pleural   effusion.

## 2025-02-08 NOTE — ASSESSMENT & PLAN NOTE
Patient's FSGs are uncontrolled due to hyperglycemia on current medication regimen.  Last A1c reviewed-   Lab Results   Component Value Date    HGBA1C 6.5 (H) 12/27/2024     Most recent fingerstick glucose reviewed-   Recent Labs   Lab 02/07/25  1652 02/07/25  2112 02/08/25  0737   POCTGLUCOSE 151* 160* 144*     Current correctional scale  Low  Maintain anti-hyperglycemic dose as follows-   Antihyperglycemics (From admission, onward)      Start     Stop Route Frequency Ordered    02/04/25 2003  insulin aspart U-100 pen 0-5 Units         -- SubQ Before meals & nightly PRN 02/04/25 1903          Hold Oral hypoglycemics while patient is in the hospital.

## 2025-02-08 NOTE — ASSESSMENT & PLAN NOTE
Patient presented with hypotension on 2/6, which improved after administration of home midodrine, allowing continuation of Lasix and BB. Despite multiple doses of albumin on 2/8, hypotension persisted, prompting a cardiology consult.

## 2025-02-08 NOTE — ASSESSMENT & PLAN NOTE
Called mother and informed the above. Mother understood all.    Referral also faxed to specialist requested.   U/S - negative for DVT. Prior known L greater saphenous vein nonocclusive thrombus resolved.     Plan:  - C/W eliquis

## 2025-02-08 NOTE — ASSESSMENT & PLAN NOTE
- 2/6: BP 80/54 early this morning. Asymptomatic. Given home midodrine with improvement.   --> BP improved with , OK to continue lasix and BB  - 2/7: BP 88/54; given albumin X 2 with improvement. Continue midodrine, hold metoprolol   - continue to monitor closely while continuing daily diuresis   - continue midodrine 10 mg TID , patient reports taking 25 mg midodrine intermittently at home  - 2/8: multiple doses of albumin with continued hypotension. Cardiology consulted with appreciation

## 2025-02-08 NOTE — SUBJECTIVE & OBJECTIVE
Past Medical History:   Diagnosis Date    Anticoagulant long-term use     Basal cell carcinoma     BPH (benign prostatic hyperplasia)     s/p TURP    Carotid stenosis     Chronic kidney disease     Claudication     DDD (degenerative disc disease) 10/21/2013    Diabetes mellitus with renal complications     Disc disease, degenerative, cervical     DVT (deep venous thrombosis)     Encounter for blood transfusion     GERD (gastroesophageal reflux disease)     Gout, chronic     History of ulcerative colitis     s/p colectomy and ileostomy    HLD (hyperlipidemia)     HTN (hypertension)     Ileostomy in place 1982    Paroxysmal atrial fibrillation     RBBB     Squamous cell carcinoma 03/08/2018    Left superior helix near insertion    Squamous cell carcinoma 04/12/2018    Left forearm x 5    Syncope 07/06/2024    Ventricular tachycardia        Past Surgical History:   Procedure Laterality Date    ABLATION, SVT, ACCESSORY PATHWAY N/A 4/30/2024    Procedure: Ablation, SVT, Accessory Pathway;  Surgeon: Franky Taylor MD;  Location: [facility] EP LAB;  Service: Cardiology;  Laterality: N/A;  VT, RFA, Carto, MAC, GP, 3 Prep *MDT ILR in situ*    cardiac stents      CATARACT EXTRACTION Bilateral     CERVICAL FUSION      CHOLECYSTECTOMY N/A 2/14/2023    Procedure: CHOLECYSTECTOMY;  Surgeon: Mike Joyce MD;  Location: [facility] OR;  Service: General;  Laterality: N/A;  very high probabilty of converison to open    colectomy and ileostomy  1985    ENDOSCOPIC ULTRASOUND OF UPPER GASTROINTESTINAL TRACT N/A 2/10/2023    Procedure: ULTRASOUND, UPPER GI TRACT, ENDOSCOPIC;  Surgeon: Poli Fuller MD;  Location: [facility] ENDO;  Service: Endoscopy;  Laterality: N/A;    ERCP N/A 2/10/2023    Procedure: ERCP (ENDOSCOPIC RETROGRADE CHOLANGIOPANCREATOGRAPHY);  Surgeon: Poli Fuller MD;  Location: [facility] ENDO;  Service: Endoscopy;  Laterality: N/A;    EXCISION OF PAROTID GLAND Left 12/18/2020    Procedure: EXCISION, PAROTID GLAND;  Surgeon: Michael  BETTY Pinzon MD;  Location: [facility] OR 2ND FLR;  Service: ENT;  Laterality: Left;    INSERTION OF IMPLANTABLE LOOP RECORDER  06/07/2021    INSERTION OF IMPLANTABLE LOOP RECORDER Left 6/7/2021    Procedure: INSERTION, IMPLANTABLE LOOP RECORDER;  Surgeon: Romario Dao MD;  Location: [facility] CATH LAB;  Service: Cardiology;  Laterality: Left;    LEFT HEART CATHETERIZATION Right 4/15/2021    Procedure: CATHETERIZATION, HEART, LEFT;  Surgeon: Marcio Jones MD;  Location: [facility] CATH LAB;  Service: Cardiology;  Laterality: Right;    LYSIS OF ADHESIONS N/A 11/9/2020    Procedure: LYSIS, ADHESIONS,  ERAS low;  Surgeon: CED Haley MD;  Location: [facility] OR 2ND FLR;  Service: Colon and Rectal;  Laterality: N/A;    LYSIS OF ADHESIONS N/A 2/14/2023    Procedure: LYSIS, ADHESIONS;  Surgeon: Mike Joyce MD;  Location: [facility];  Service: General;  Laterality: N/A;    POUCHOSCOPY N/A 4/6/2022    Procedure: ENDOSCOPY, POUCH, SMALL INTESTINE, DIAGNOSTIC;  Surgeon: Dieter Juarez MD;  Location: [facility] ENDO (2ND FLR);  Service: Endoscopy;  Laterality: N/A;    REPAIR, HERNIA, PARASTOMAL N/A 11/9/2020    Procedure: REPAIR, HERNIA, PARASTOMAL;  Surgeon: CED Haley MD;  Location: [facility] OR 2ND FLR;  Service: Colon and Rectal;  Laterality: N/A;    TRANSURETHRAL RESECTION OF PROSTATE (TURP) WITHOUT USE OF LASER N/A 1/23/2019    Procedure: TURP, WITHOUT USING LASER BIPOLAR;  Surgeon: Catarino Mota MD;  Location: [facility] OR 1ST FLR;  Service: Urology;  Laterality: N/A;  1.5 HOURS    TREATMENT OF CARDIAC ARRHYTHMIA  4/30/2024    Procedure: Cardioversion or Defibrillation;  Surgeon: Franky Taylor MD;  Location: [facility] EP LAB;  Service: Cardiology;;       Review of patient's allergies indicates:   Allergen Reactions    Atorvastatin     Rosuvastatin        Current Facility-Administered Medications on File Prior to Encounter   Medication    sodium chloride 0.9% in 1,000 mL infusion     Current Outpatient Medications on File Prior  to Encounter   Medication Sig    allopurinoL (ZYLOPRIM) 100 MG tablet Take 4 tablets (400 mg total) by mouth once daily. (Patient taking differently: Take 150 mg by mouth every other day.)    amiodarone (PACERONE) 200 MG Tab Take 1 tablet (200 mg total) by mouth 2 (two) times daily.    antiox.mv no.10/omeg3s/lut/gilma (I-CAPS ORAL) Take 1 capsule by mouth once daily.    apixaban (ELIQUIS) 5 mg Tab Take 1 tablet (5 mg total) by mouth 2 (two) times daily.    brimonidine 0.2% (ALPHAGAN) 0.2 % Drop Place 1 drop into both eyes 2 (two) times a day.    colchicine (COLCRYS) 0.6 mg tablet Take 1 tablet (0.6 mg total) by mouth every other day.    docusate sodium 100 mg capsule Take 100 mg by mouth 2 (two) times daily as needed for Constipation.    ferrous sulfate (IRON) 325 mg (65 mg iron) Tab tablet Take 1 tablet (325 mg total) by mouth every other day.    furosemide (LASIX) 40 MG tablet Hold over the weekend and starting on Monday, February 3rd start taking one tablet by mouth every other day.    glimepiride (AMARYL) 4 MG tablet TAKE 1 TABLET TWICE DAILY BEFORE MEALS    metoprolol succinate (TOPROL-XL) 25 MG 24 hr tablet Take 1 tablet (25 mg total) by mouth once daily.    midodrine (PROAMATINE) 2.5 MG Tab Take 1 tablet (2.5 mg total) by mouth 2 (two) times daily with meals.    pantoprazole (PROTONIX) 40 MG tablet TAKE 1 TABLET ONE TIME DAILY    pravastatin (PRAVACHOL) 40 MG tablet Take 1 tablet (40 mg total) by mouth once daily.    predniSONE (DELTASONE) 2.5 MG tablet Take 2.5 mg by mouth every other day.    sodium bicarbonate 650 MG tablet Take 2 tablets (1,300 mg total) by mouth 2 (two) times daily.    tamsulosin (FLOMAX) 0.4 mg Cap Take 1 capsule (0.4 mg total) by mouth once daily.    ACCU-CHEK PAUL CONTROL SOLN Soln 1 drop by NOT APPLICABLE route once daily.    ACCU-CHEK SOFTCLIX LANCETS [facility] TEST BLOOD SUGAR THREE TIMES DAILY    alcohol swabs (DROPSAFE ALCOHOL PREP PADS) PadM Apply 1 each topically once daily.     aspirin (ECOTRIN) 81 MG EC tablet Take 1 tablet (81 mg total) by mouth once daily. (Patient not taking: Reported on 2/3/2025)    blood sugar diagnostic (ACCU-CHEK GUIDE TEST STRIPS) Strp 1 each by Other route 3 (three) times daily. Test Blood Sugar    blood-glucose meter (ACCU-CHEK GUIDE GLUCOSE METER) Misc Accucheck BID     Family History       Problem Relation (Age of Onset)    Cancer Father    Dementia Mother    Diabetes Father    Heart disease Father          Tobacco Use    Smoking status: Never     Passive exposure: Never    Smokeless tobacco: Never   Substance and Sexual Activity    Alcohol use: No    Drug use: No    Sexual activity: Not Currently     Partners: Female     Review of Systems   Constitutional: Positive for malaise/fatigue.   Cardiovascular:  Positive for dyspnea on exertion and leg swelling. Negative for chest pain.   Respiratory:  Positive for shortness of breath. Negative for sputum production and wheezing.      Objective:     Vital Signs (Most Recent):  Temp: 97.6 °F (36.4 °C) (02/08/25 0730)  Pulse: 73 (02/08/25 0730)  Resp: 16 (02/08/25 0730)  BP: (!) 80/55 (02/08/25 1104)  SpO2: 100 % (02/08/25 0730) Vital Signs (24h Range):  Temp:  [97.4 °F (36.3 °C)-98.3 °F (36.8 °C)] 97.6 °F (36.4 °C)  Pulse:  [68-73] 73  Resp:  [16-18] 16  SpO2:  [95 %-100 %] 100 %  BP: ()/(55-72) 80/55     Weight: 68 kg (150 lb)  Body mass index is 19.26 kg/m².    SpO2: 100 %         Intake/Output Summary (Last 24 hours) at 2/8/2025 1215  Last data filed at 2/8/2025 0315  Gross per 24 hour   Intake 916 ml   Output 255 ml   Net 661 ml       Lines/Drains/Airways       Drain  Duration                  Ileostomy 01/01/84 RUQ 66329 days              Peripheral Intravenous Line  Duration                  Peripheral IV - Single Lumen 02/03/25 1034 20 G Posterior;Right Forearm 5 days                     Physical Exam  Vitals reviewed.   Constitutional:       Appearance: He is underweight. He is ill-appearing.   Eyes:      " General: No scleral icterus.  Neck:      Vascular: JVD present.   Cardiovascular:      Rate and Rhythm: Normal rate and regular rhythm.      Pulses: Normal pulses.      Heart sounds: Murmur heard.      Medium-pitched holosystolic murmur is present with a grade of 3/6 at the apex.      Gallop present. S3 and S4 sounds present.   Pulmonary:      Effort: Tachypnea and accessory muscle usage present.      Comments: Crackles up through the mid lungs. No wheezes  Abdominal:      General: There is no distension.      Palpations: Abdomen is soft.   Musculoskeletal:      Right lower leg: Edema present.      Left lower leg: Edema present.   Skin:     General: Skin is warm and dry.      Coloration: Skin is pale.   Neurological:      Mental Status: He is easily aroused.          Significant Labs: ABG: No results for input(s): "PH", "PCO2", "HCO3", "POCSATURATED", "BE" in the last 48 hours., CMP   Recent Labs   Lab 02/07/25  1001 02/08/25  0144    135*   K 4.8 4.8    103   CO2 26 22*    140*   BUN 57* 57*   CREATININE 2.6* 2.6*   CALCIUM 8.6* 8.2*   PROT 6.1 5.8*   ALBUMIN 3.1* 3.0*   BILITOT 0.8 0.6   ALKPHOS 138 146   AST 14 27   ALT 17 26   ANIONGAP 9 10   , CBC   Recent Labs   Lab 02/07/25  1001 02/08/25  0144   WBC 5.05 3.87*   HGB 10.4* 9.6*   HCT 34.4* 32.6*   * 105*   , INR No results for input(s): "INR", "PROTIME" in the last 48 hours., Lipid Panel No results for input(s): "CHOL", "HDL", "LDLCALC", "TRIG", "CHOLHDL" in the last 48 hours., Troponin No results for input(s): "TROPONINIHS" in the last 48 hours., and All pertinent lab results from the last 24 hours have been reviewed.    Significant Imaging: Echocardiogram: Transthoracic echo (TTE) complete (Cupid Only):   Results for orders placed or performed during the hospital encounter of 01/24/25   Echo   Result Value Ref Range    BSA 1.95 m2    LVOT stroke volume 51.9 cm3    LVIDd 5.9 3.5 - 6.0 cm    LV Systolic Volume 131.90 mL    LV " Systolic Volume Index 66.6 mL/m2    LVIDs 5.2 (A) 2.1 - 4.0 cm    LV Diastolic Volume 171.94 mL    LV Diastolic Volume Index 86.84 mL/m2    Left Ventricular End Systolic Volume by Teichholz Method 131.90 mL    Left Ventricular End Diastolic Volume by Teichholz Method 171.94 mL    IVS 0.9 0.6 - 1.1 cm    LVOT diameter 2.3 cm    LVOT area 4.2 cm2    FS 11.9 (A) 28 - 44 %    Left Ventricle Relative Wall Thickness 0.27 cm    PW 0.8 0.6 - 1.1 cm    LV mass 195.0 g    LV Mass Index 98.5 g/m2    MV Peak E Joao 0.94 m/s    TR Max Joao 2.6 m/s    IVRT 46 msec    E wave deceleration time 132 msec    PV Peak S Joao 0.61 m/s    LVOT peak joao 0.8 m/s    Left Ventricular Outflow Tract Mean Velocity 0.58 cm/s    Left Ventricular Outflow Tract Mean Gradient 1.45 mmHg    LA size 4.6 cm    Left Atrium Minor Axis 6.6 cm    Left Atrium Major Axis 7.5 cm    RA Major Axis 5.91 cm    AV mean gradient 2 mmHg    AV peak gradient 3 mmHg    Ao peak joao 0.8 m/s    Ao VTI 10.4 cm    LVOT peak VTI 12.5 cm    AV valve area 5.0 cm²    AV Velocity Ratio 1.00     AV index (prosthetic) 1.20     KJ by Velocity Ratio 4.2 cm²    Mr max joao 4.48 m/s    MV stenosis pressure 1/2 time 38.15 ms    MV valve area p 1/2 method 5.77 cm2    Triscuspid Valve Regurgitation Peak Gradient 27 mmHg    PV PEAK VELOCITY 0.58 m/s    PV peak gradient 1 mmHg    IVC diameter 2.60 cm    ZLVIDS 3.10     ZLVIDD 0.32     JAKI 67 mL/m2    LA Vol 132 cm3    LA WIDTH 4.8 cm    RA Width 5.2 cm    TV resting pulmonary artery pressure 42 mmHg    RV TB RVSP 18 mmHg    Est. RA pres 15 mmHg    EF 30 %    Narrative      Left Ventricle: The left ventricle is mildly dilated. Ventricular mass   is normal. Normal wall thickness. Severe global hypokinesis present. There   is severely reduced systolic function. Ejection fraction is approximately   30%. Grade I diastolic dysfunction.    Right Ventricle: Normal right ventricular cavity size. Wall thickness   is normal. Right ventricle wall motion  has global hypokinesis. Systolic   function is moderately reduced.    Left Atrium: Left atrium is severely dilated.    Right Atrium: Right atrium is moderately dilated.    Aortic Valve: The aortic valve is a trileaflet valve. There is mild   aortic valve sclerosis. There is mild aortic regurgitation with a   centrally directed jet.    Mitral Valve: There is severe regurgitation with a centrally directed   jet.    Tricuspid Valve: There is moderate to severe regurgitation with a   centrally directed jet.    Pulmonary Artery: There is mild to moderate pulmonary hypertension. The   estimated pulmonary artery systolic pressure is 42 mmHg.    IVC/SVC: Elevated venous pressure at 15 mmHg.    Pericardium: There is a small circumferential effusion. Left pleural   effusion.

## 2025-02-08 NOTE — NURSING
Pt BP 79/57, MAP 63. HR 61. Pt asymptomatic; Med team notified; pt started on Albumin 50g IV. Scheduled dose of Midodrine given.

## 2025-02-08 NOTE — PLAN OF CARE
Plan of care update:    Patient with persistent hypotension despite multiple albumin pushes. Now appearing dyspneic and pale. Cardiology consulted, transferring to CICU for inotropic/ pressor support for continued diuresis.     Keesha Harris PA-C  Department of [facility] Medicine

## 2025-02-08 NOTE — H&P
[facility]  Cardiology  History and Physical     Patient Name: Jarrett Charlton Jr.  MRN: [id number]  Admission Date: 2/3/2025  Code Status: Full Code   Attending Provider: Russell Gamboa MD   Primary Care Physician: Tripp Mohan MD  Principal Problem:Acute on chronic congestive heart failure    Patient information was obtained from patient and ER records.     Subjective:     Chief Complaint:  Shortness of breath     HPI:  Mr. Charlton is a 74 year old man with CAD s/p PCI (mLAD 2017), hx VT, atrial tach s/p ablation (4/2024), paroxysmal afib, HFrEF, mitral and tricuspid regurgitation, hx DVT on eliquis, Chron's disease s/p ileostomy, CKD4, T2DM, orthostatic hypotension,  HTN, and HLD admitted with volume overload    Cardiology consulted for hypotension restricting diuresis.     Patient admitted on 2/3 for decreased ostomy output and worsening LE edema. He was recently admitted at [facility] (1/24-1/30) with LLE sphenous vein non-occlusive DVT and had an GRADY for which his home lasix was supposed to be held until 2/10. He was noted to have a new pancreatic lesion and is planned to follow with [facility] outpatient. Patient has been hypotensive with SBP as low as 80's over the last 72 hrs. He received fluid earlier in this admission, but was later started on midodrine 10mg TID. He received albumin on 2/7 and 2/8 for ongoing hypotension. In terms of diuretics, patient received lasix 40mg IV on 2/3 X2, lasix 40mg IV on 2/4 X1, and then has been of lasix 40mg po daily since. Urine output and daily weight have not been accurately tracked. Labs are notable for microcytic, hypochromic anemia, mild throbmocytopenia (plt 105), Cr 2.6 (b/l around 2.5), albumin < 3 prior to albumin infusions, BNP 2.7k (b/l > 2k though is trending higher) on admission. 1/24/25TTE notable for possible progression from moderate to severe MR, and mild-moderate to moderate to severe TR. CXR with small L pleural effusion and  mild pulm edema per my interpretation. ECG at baseline with RBBB.     At bedside patient reports significant SOB and fatigue.     Past Medical History:   Diagnosis Date    Anticoagulant long-term use     Basal cell carcinoma     BPH (benign prostatic hyperplasia)     s/p TURP    Carotid stenosis     Chronic kidney disease     Claudication     DDD (degenerative disc disease) 10/21/2013    Diabetes mellitus with renal complications     Disc disease, degenerative, cervical     DVT (deep venous thrombosis)     Encounter for blood transfusion     GERD (gastroesophageal reflux disease)     Gout, chronic     History of ulcerative colitis     s/p colectomy and ileostomy    HLD (hyperlipidemia)     HTN (hypertension)     Ileostomy in place 1982    Paroxysmal atrial fibrillation     RBBB     Squamous cell carcinoma 03/08/2018    Left superior helix near insertion    Squamous cell carcinoma 04/12/2018    Left forearm x 5    Syncope 07/06/2024    Ventricular tachycardia        Past Surgical History:   Procedure Laterality Date    ABLATION, SVT, ACCESSORY PATHWAY N/A 4/30/2024    Procedure: Ablation, SVT, Accessory Pathway;  Surgeon: Franky Taylor MD;  Location: [facility] EP LAB;  Service: Cardiology;  Laterality: N/A;  VT, RFA, Carto, MAC, GP, 3 Prep *MDT ILR in situ*    cardiac stents      CATARACT EXTRACTION Bilateral     CERVICAL FUSION      CHOLECYSTECTOMY N/A 2/14/2023    Procedure: CHOLECYSTECTOMY;  Surgeon: Mike Joyce MD;  Location: [facility] OR;  Service: General;  Laterality: N/A;  very high probabilty of converison to open    colectomy and ileostomy  1985    ENDOSCOPIC ULTRASOUND OF UPPER GASTROINTESTINAL TRACT N/A 2/10/2023    Procedure: ULTRASOUND, UPPER GI TRACT, ENDOSCOPIC;  Surgeon: Poli Fuller MD;  Location: [facility] ENDO;  Service: Endoscopy;  Laterality: N/A;    ERCP N/A 2/10/2023    Procedure: ERCP (ENDOSCOPIC RETROGRADE CHOLANGIOPANCREATOGRAPHY);  Surgeon: Poli Fuller MD;  Location: [facility] ENDO;  Service:  Endoscopy;  Laterality: N/A;    EXCISION OF PAROTID GLAND Left 12/18/2020    Procedure: EXCISION, PAROTID GLAND;  Surgeon: Michael Pinzon MD;  Location: [facility] OR 2ND FLR;  Service: ENT;  Laterality: Left;    INSERTION OF IMPLANTABLE LOOP RECORDER  06/07/2021    INSERTION OF IMPLANTABLE LOOP RECORDER Left 6/7/2021    Procedure: INSERTION, IMPLANTABLE LOOP RECORDER;  Surgeon: Romario Dao MD;  Location: [facility] CATH LAB;  Service: Cardiology;  Laterality: Left;    LEFT HEART CATHETERIZATION Right 4/15/2021    Procedure: CATHETERIZATION, HEART, LEFT;  Surgeon: Marcio Jones MD;  Location: [facility] CATH LAB;  Service: Cardiology;  Laterality: Right;    LYSIS OF ADHESIONS N/A 11/9/2020    Procedure: LYSIS, ADHESIONS,  ERAS low;  Surgeon: CED Haley MD;  Location: [facility] OR 2ND FLR;  Service: Colon and Rectal;  Laterality: N/A;    LYSIS OF ADHESIONS N/A 2/14/2023    Procedure: LYSIS, ADHESIONS;  Surgeon: Mike Joyce MD;  Location: [facility] OR;  Service: General;  Laterality: N/A;    POUCHOSCOPY N/A 4/6/2022    Procedure: ENDOSCOPY, POUCH, SMALL INTESTINE, DIAGNOSTIC;  Surgeon: Dieter Juarez MD;  Location: [facility] ENDO (2ND FLR);  Service: Endoscopy;  Laterality: N/A;    REPAIR, HERNIA, PARASTOMAL N/A 11/9/2020    Procedure: REPAIR, HERNIA, PARASTOMAL;  Surgeon: CED Haley MD;  Location: [facility] OR 2ND FLR;  Service: Colon and Rectal;  Laterality: N/A;    TRANSURETHRAL RESECTION OF PROSTATE (TURP) WITHOUT USE OF LASER N/A 1/23/2019    Procedure: TURP, WITHOUT USING LASER BIPOLAR;  Surgeon: Catarino Mota MD;  Location: [facility] OR 1ST FLR;  Service: Urology;  Laterality: N/A;  1.5 HOURS    TREATMENT OF CARDIAC ARRHYTHMIA  4/30/2024    Procedure: Cardioversion or Defibrillation;  Surgeon: Franky Taylor MD;  Location: [facility] EP LAB;  Service: Cardiology;;       Review of patient's allergies indicates:   Allergen Reactions    Atorvastatin     Rosuvastatin        Current Facility-Administered Medications on  File Prior to Encounter   Medication    sodium chloride 0.9% in 1,000 mL infusion     Current Outpatient Medications on File Prior to Encounter   Medication Sig    allopurinoL (ZYLOPRIM) 100 MG tablet Take 4 tablets (400 mg total) by mouth once daily. (Patient taking differently: Take 150 mg by mouth every other day.)    amiodarone (PACERONE) 200 MG Tab Take 1 tablet (200 mg total) by mouth 2 (two) times daily.    antiox.mv no.10/omeg3s/lut/gilma (I-CAPS ORAL) Take 1 capsule by mouth once daily.    apixaban (ELIQUIS) 5 mg Tab Take 1 tablet (5 mg total) by mouth 2 (two) times daily.    brimonidine 0.2% (ALPHAGAN) 0.2 % Drop Place 1 drop into both eyes 2 (two) times a day.    colchicine (COLCRYS) 0.6 mg tablet Take 1 tablet (0.6 mg total) by mouth every other day.    docusate sodium 100 mg capsule Take 100 mg by mouth 2 (two) times daily as needed for Constipation.    ferrous sulfate (IRON) 325 mg (65 mg iron) Tab tablet Take 1 tablet (325 mg total) by mouth every other day.    furosemide (LASIX) 40 MG tablet Hold over the weekend and starting on Monday, February 3rd start taking one tablet by mouth every other day.    glimepiride (AMARYL) 4 MG tablet TAKE 1 TABLET TWICE DAILY BEFORE MEALS    metoprolol succinate (TOPROL-XL) 25 MG 24 hr tablet Take 1 tablet (25 mg total) by mouth once daily.    midodrine (PROAMATINE) 2.5 MG Tab Take 1 tablet (2.5 mg total) by mouth 2 (two) times daily with meals.    pantoprazole (PROTONIX) 40 MG tablet TAKE 1 TABLET ONE TIME DAILY    pravastatin (PRAVACHOL) 40 MG tablet Take 1 tablet (40 mg total) by mouth once daily.    predniSONE (DELTASONE) 2.5 MG tablet Take 2.5 mg by mouth every other day.    sodium bicarbonate 650 MG tablet Take 2 tablets (1,300 mg total) by mouth 2 (two) times daily.    tamsulosin (FLOMAX) 0.4 mg Cap Take 1 capsule (0.4 mg total) by mouth once daily.    ACCU-CHEK PAUL CONTROL SOLN Soln 1 drop by NOT APPLICABLE route once daily.    ACCU-CHEK SOFTCLIX LANCETS Misc  TEST BLOOD SUGAR THREE TIMES DAILY    alcohol swabs (DROPSAFE ALCOHOL PREP PADS) PadM Apply 1 each topically once daily.    aspirin (ECOTRIN) 81 MG EC tablet Take 1 tablet (81 mg total) by mouth once daily. (Patient not taking: Reported on 2/3/2025)    blood sugar diagnostic (ACCU-CHEK GUIDE TEST STRIPS) Strp 1 each by Other route 3 (three) times daily. Test Blood Sugar    blood-glucose meter (ACCU-CHEK GUIDE GLUCOSE METER) [facility] Accucheck BID     Family History       Problem Relation (Age of Onset)    Cancer Father    Dementia Mother    Diabetes Father    Heart disease Father          Tobacco Use    Smoking status: Never     Passive exposure: Never    Smokeless tobacco: Never   Substance and Sexual Activity    Alcohol use: No    Drug use: No    Sexual activity: Not Currently     Partners: Female     Review of Systems   Constitutional: Positive for malaise/fatigue. Negative for chills, diaphoresis and night sweats.   Cardiovascular:  Positive for dyspnea on exertion, irregular heartbeat and leg swelling. Negative for chest pain, near-syncope and palpitations.   Respiratory:  Positive for cough and shortness of breath. Negative for wheezing.    Skin:  Negative for color change and dry skin.   Musculoskeletal:  Negative for joint pain and joint swelling.   Gastrointestinal:  Negative for abdominal pain, diarrhea, nausea and vomiting.   Genitourinary:  Negative for dysuria.   Neurological:  Positive for dizziness and weakness. Negative for headaches and light-headedness.   All other systems reviewed and are negative.    Objective:     Vital Signs (Most Recent):  Temp: 96.7 °F (35.9 °C) (02/08/25 1227)  Pulse: 68 (02/08/25 1227)  Resp: (!) 25 (02/08/25 1227)  BP: (!) 92/52 (02/08/25 1227)  SpO2: 100 % (02/08/25 1227) Vital Signs (24h Range):  Temp:  [96.7 °F (35.9 °C)-98.3 °F (36.8 °C)] 96.7 °F (35.9 °C)  Pulse:  [61-73] 68  Resp:  [16-25] 25  SpO2:  [95 %-100 %] 100 %  BP: ()/(52-72) 92/52     Weight: 68 kg (150  lb)  Body mass index is 19.26 kg/m².    SpO2: 100 %         Intake/Output Summary (Last 24 hours) at 2/8/2025 1324  Last data filed at 2/8/2025 0900  Gross per 24 hour   Intake 1016 ml   Output 255 ml   Net 761 ml       Lines/Drains/Airways       Drain  Duration                  Ileostomy 01/01/84 RUQ 25989 days              Peripheral Intravenous Line  Duration                  Peripheral IV - Single Lumen 02/03/25 1034 20 G Posterior;Right Forearm 5 days                     Physical Exam  Vitals reviewed.   Constitutional:       Appearance: He is underweight. He is ill-appearing.   Eyes:      General: No scleral icterus.  Neck:      Vascular: JVD present.   Cardiovascular:      Rate and Rhythm: Normal rate and regular rhythm.      Pulses: Normal pulses.      Heart sounds: Murmur heard.      Medium-pitched holosystolic murmur is present with a grade of 3/6 at the apex.      Gallop present. S3 and S4 sounds present.   Pulmonary:      Effort: No tachypnea or accessory muscle usage.      Comments: Crackles up through the mid lungs. No wheezes  Abdominal:      General: There is no distension.      Palpations: Abdomen is soft.   Musculoskeletal:      Right lower leg: Edema present.      Left lower leg: Edema present.      Comments: Leg swelling R > L   Skin:     General: Skin is warm and dry.      Coloration: Skin is pale.   Neurological:      Mental Status: He is easily aroused.          Significant Labs: CMP   Recent Labs   Lab 02/07/25  1001 02/08/25  0144    135*   K 4.8 4.8    103   CO2 26 22*    140*   BUN 57* 57*   CREATININE 2.6* 2.6*   CALCIUM 8.6* 8.2*   PROT 6.1 5.8*   ALBUMIN 3.1* 3.0*   BILITOT 0.8 0.6   ALKPHOS 138 146   AST 14 27   ALT 17 26   ANIONGAP 9 10    and CBC   Recent Labs   Lab 02/07/25  1001 02/08/25  0144   WBC 5.05 3.87*   HGB 10.4* 9.6*   HCT 34.4* 32.6*   * 105*       Significant Imaging: Echocardiogram: Transthoracic echo (TTE) complete (Cupid Only):   Results for  orders placed or performed during the hospital encounter of 01/24/25   Echo   Result Value Ref Range    BSA 1.95 m2    LVOT stroke volume 51.9 cm3    LVIDd 5.9 3.5 - 6.0 cm    LV Systolic Volume 131.90 mL    LV Systolic Volume Index 66.6 mL/m2    LVIDs 5.2 (A) 2.1 - 4.0 cm    LV Diastolic Volume 171.94 mL    LV Diastolic Volume Index 86.84 mL/m2    Left Ventricular End Systolic Volume by Teichholz Method 131.90 mL    Left Ventricular End Diastolic Volume by Teichholz Method 171.94 mL    IVS 0.9 0.6 - 1.1 cm    LVOT diameter 2.3 cm    LVOT area 4.2 cm2    FS 11.9 (A) 28 - 44 %    Left Ventricle Relative Wall Thickness 0.27 cm    PW 0.8 0.6 - 1.1 cm    LV mass 195.0 g    LV Mass Index 98.5 g/m2    MV Peak E Joao 0.94 m/s    TR Max Joao 2.6 m/s    IVRT 46 msec    E wave deceleration time 132 msec    PV Peak S Joao 0.61 m/s    LVOT peak joao 0.8 m/s    Left Ventricular Outflow Tract Mean Velocity 0.58 cm/s    Left Ventricular Outflow Tract Mean Gradient 1.45 mmHg    LA size 4.6 cm    Left Atrium Minor Axis 6.6 cm    Left Atrium Major Axis 7.5 cm    RA Major Axis 5.91 cm    AV mean gradient 2 mmHg    AV peak gradient 3 mmHg    Ao peak joao 0.8 m/s    Ao VTI 10.4 cm    LVOT peak VTI 12.5 cm    AV valve area 5.0 cm²    AV Velocity Ratio 1.00     AV index (prosthetic) 1.20     KJ by Velocity Ratio 4.2 cm²    Mr max joao 4.48 m/s    MV stenosis pressure 1/2 time 38.15 ms    MV valve area p 1/2 method 5.77 cm2    Triscuspid Valve Regurgitation Peak Gradient 27 mmHg    PV PEAK VELOCITY 0.58 m/s    PV peak gradient 1 mmHg    IVC diameter 2.60 cm    ZLVIDS 3.10     ZLVIDD 0.32     JAKI 67 mL/m2    LA Vol 132 cm3    LA WIDTH 4.8 cm    RA Width 5.2 cm    TV resting pulmonary artery pressure 42 mmHg    RV TB RVSP 18 mmHg    Est. RA pres 15 mmHg    EF 30 %    Narrative      Left Ventricle: The left ventricle is mildly dilated. Ventricular mass   is normal. Normal wall thickness. Severe global hypokinesis present. There   is severely  reduced systolic function. Ejection fraction is approximately   30%. Grade I diastolic dysfunction.    Right Ventricle: Normal right ventricular cavity size. Wall thickness   is normal. Right ventricle wall motion has global hypokinesis. Systolic   function is moderately reduced.    Left Atrium: Left atrium is severely dilated.    Right Atrium: Right atrium is moderately dilated.    Aortic Valve: The aortic valve is a trileaflet valve. There is mild   aortic valve sclerosis. There is mild aortic regurgitation with a   centrally directed jet.    Mitral Valve: There is severe regurgitation with a centrally directed   jet.    Tricuspid Valve: There is moderate to severe regurgitation with a   centrally directed jet.    Pulmonary Artery: There is mild to moderate pulmonary hypertension. The   estimated pulmonary artery systolic pressure is 42 mmHg.    IVC/SVC: Elevated venous pressure at 15 mmHg.    Pericardium: There is a small circumferential effusion. Left pleural   effusion.       Assessment and Plan:     * Acute on chronic congestive heart failure  Patient admitted with acute on chronic heart failure exacerbation complicated by hypotension limiting diuresis. Has received fluid, albumin, and midodrine for BP support. Minimal charting of UOP and weight trend. TR and MR worse on most recent TTE though possibly due to volume overload rather than progression of disease and may be reversible. If remains severe MR and clinically improves may be candidate for cherie-clip. At this time, patient requires aggressive diuresis given deteriorating clinical status which is likely to require inotrope or vasopressor support given BP. He was transferred to CCU for further management.     Current Heart Failure Medications  furosemide injection 80 mg, Once, Intravenous  furosemide (Lasix) 500 mg in 50 mL infusion (conc: 10 mg/mL), Continuous, Intravenous      Plan  - Monitor strict I&Os and daily weights.    - Place on telemetry  - Low  "sodium diet  - Place on fluid restriction of 1.5 L.   - Lactate 2.4. Continue to trend  - Continue Lasix gtt (+/- central line and vasopressors/inotropes for BP support)      Atelectasis  I have personally reviewed Chest X-ray. Patient with atelectasis on interpretation. Likely  compressive atelectasis . Signs and symptoms include Shortness of breath Will begin treatment with incentive spirometry and upright positioning.    Pancreatic mass  CT abd w contrast reviewed: "There is a subtle low attenuating lesion at the pancreatic head measuring 1.1 cm, stable noting fatty atrophy of the pancreas without pancreatic ductal dilation."  - [facility] consulted, recommend against ERCP at this time. Plan for follow up in clinic with imaging.   - referral to [facility] at [place]    History of DVT (deep vein thrombosis)  U/S - negative for DVT. Prior known L greater saphenous vein nonocclusive thrombus resolved.     Plan:  - C/W eliquis    Paroxysmal atrial fibrillation  Patient has paroxysmal (<7 days) atrial fibrillation. Patient is currently in sinus rhythm. [id number] Score: 4. The patients heart rate in the last 24 hours is as follows:  Pulse  Min: 61  Max: 73     Antiarrhythmics  amiodarone tablet 200 mg, 2 times daily, Oral    Anticoagulants       Plan  - Replete lytes with a goal of K>4, Mg >2  - Patient is anticoagulated, see medications listed above.  - Patient's afib is currently controlled        Pulmonary hypertension  - diuresis as above    Ileostomy in place  Pt reporting decreased ostomy output, reports no flatulence in past 3 days. CT abdomen performed and reviewed: "Surgical change of colectomy, noting some slow flow proximal to the ostomy without obstruction."  - pt tolerating full diet. However, if change in PO intake would consider further evaluation.   - continue close monitoring of bowel function and ostomy   - now having normal ostomy output    Hypotension  Patient presented with hypotension on 2/6, which improved after " administration of home midodrine, allowing continuation of Lasix and BB. Despite multiple doses of albumin on 2/8, hypotension persisted, prompting a cardiology consult.    CAD (coronary artery disease)  CAD s/p PCI (mLAD 2017)   - Continue home ASA    History of ulcerative colitis  Stable. s/p colectomy and ileostomy      GERD (gastroesophageal reflux disease)  Continue home protonix     BPH (benign prostatic hyperplasia)  s/p TURP  Continue home tamsulosin    Chronic renal impairment, stage 4 (severe)  Creatine stable for now. BMP reviewed- noted Estimated Creatinine Clearance: 23.6 mL/min (A) (based on SCr of 2.6 mg/dL (H)). according to latest data. Based on current GFR, CKD stage is stage 4 - GFR 15-29.  Monitor UOP and serial BMP and adjust therapy as needed. Renally dose meds. Avoid nephrotoxic medications and procedures.    T2DM (type 2 diabetes mellitus)  Patient's FSGs are uncontrolled due to hyperglycemia on current medication regimen.  Last A1c reviewed-   Lab Results   Component Value Date    HGBA1C 6.5 (H) 12/27/2024     Most recent fingerstick glucose reviewed-   Recent Labs   Lab 02/07/25  1652 02/07/25  2112 02/08/25  0737   POCTGLUCOSE 151* 160* 144*     Current correctional scale  Low  Maintain anti-hyperglycemic dose as follows-   Antihyperglycemics (From admission, onward)      Start     Stop Route Frequency Ordered    02/04/25 2003  insulin aspart U-100 pen 0-5 Units         -- SubQ Before meals & nightly PRN 02/04/25 1903          Hold Oral hypoglycemics while patient is in the hospital.        VTE Risk Mitigation (From admission, onward)      None            Prosper Woods MD  Cardiology   [facility] - [facility]

## 2025-02-08 NOTE — ASSESSMENT & PLAN NOTE
Patient admitted with acute on chronic heart failure exacerbation complicated by hypotension limiting diuresis. Has received fluid, albumin, and midodrine for BP support. Minimal charting of UOP and weight trend. TR and MR worse on most recent TTE though possibly due to volume overload rather than progression of disease and may be reversible. If remains severe MR and clinically improves may be candidate for cherie-clip. At this time, patient requires aggressive diuresis given deteriorating clinical status which is likely to require inotrope or vasopressor support given BP.        Current Heart Failure Medications  furosemide tablet 40 mg, Daily, Oral  metoprolol succinate (TOPROL-XL) 24 hr split tablet 12.5 mg, Daily, Oral    Plan  - Monitor strict I&Os and daily weights.    - Place on telemetry  - Low sodium diet  - Place on fluid restriction of 1.5 L.   - cardiology consulted and will transfer patient to CCU  [ ] STAT lactate  [ ] stop lasix 40 po and metoprolol po  [ ] plan for IV lasix 100mg X1 when in the unit (+/- central line and vasopressors/inotropes for BP support)

## 2025-02-08 NOTE — ASSESSMENT & PLAN NOTE
Creatine stable for now. BMP reviewed- noted Estimated Creatinine Clearance: 23.6 mL/min (A) (based on SCr of 2.6 mg/dL (H)). according to latest data. Based on current GFR, CKD stage is stage 4 - GFR 15-29.  Monitor UOP and serial BMP and adjust therapy as needed. Renally dose meds. Avoid nephrotoxic medications and procedures.

## 2025-02-08 NOTE — ASSESSMENT & PLAN NOTE
Patient has paroxysmal (<7 days) atrial fibrillation. Patient is currently in sinus rhythm. [id number] Score: 4. The patients heart rate in the last 24 hours is as follows:  Pulse  Min: 61  Max: 73     Antiarrhythmics  amiodarone tablet 200 mg, 2 times daily, Oral    Anticoagulants       Plan  - Replete lytes with a goal of K>4, Mg >2  - Patient is anticoagulated, see medications listed above.  - Patient's afib is currently controlled

## 2025-02-08 NOTE — HPI
Mr. Charlton is a 74 year old man with CAD s/p PCI (mLAD 2017), hx VT, atrial tach s/p ablation (4/2024), paroxysmal afib, HFrEF, mitral and tricuspid regurgitation, hx DVT on eliquis, Chron's disease s/p ileostomy, CKD4, T2DM, orthostatic hypotension,  HTN, and HLD admitted with volume overload    Cardiology consulted for hypotension restricting diuresis.     Patient admitted on 2/3 for decreased ostomy output and worsening LE edema. He was recently admitted at [facility] (1/24-1/30) with LLE sphenous vein non-occlusive DVT and had an GRADY for which his home lasix was supposed to be held until 2/10. He was noted to have a new pancreatic lesion and is planned to follow with [facility] outpatient. Patient has been hypotensive with SBP as low as 80's over the last 72 hrs. He received fluid earlier in this admission, but was later started on midodrine 10mg TID. He received albumin on 2/7 and 2/8 for ongoing hypotension. In terms of diuretics, patient received lasix 40mg IV on 2/3 X2, lasix 40mg IV on 2/4 X1, and then has been of lasix 40mg po daily since. Urine output and daily weight have not been accurately tracked. Labs are notable for microcytic, hypochromic anemia, mild throbmocytopenia (plt 105), Cr 2.6 (b/l around 2.5), albumin < 3 prior to albumin infusions, BNP 2.7k (b/l > 2k though is trending higher) on admission. 1/24/25TTE notable for possible progression from moderate to severe MR, and mild-moderate to moderate to severe TR. CXR with small L pleural effusion and mild pulm edema per my interpretation. ECG at baseline with RBBB.     At bedside patient reports significant SOB and fatigue.

## 2025-02-08 NOTE — ASSESSMENT & PLAN NOTE
Patient admitted with acute on chronic heart failure exacerbation complicated by hypotension limiting diuresis. Has received fluid, albumin, and midodrine for BP support. Minimal charting of UOP and weight trend. TR and MR worse on most recent TTE though possibly due to volume overload rather than progression of disease and may be reversible. If remains severe MR and clinically improves may be candidate for cherie-clip. At this time, patient requires aggressive diuresis given deteriorating clinical status which is likely to require inotrope or vasopressor support given BP. He was transferred to CCU for further management.     Current Heart Failure Medications  furosemide injection 80 mg, Once, Intravenous  furosemide (Lasix) 500 mg in 50 mL infusion (conc: 10 mg/mL), Continuous, Intravenous      Plan  - Monitor strict I&Os and daily weights.    - Place on telemetry  - Low sodium diet  - Place on fluid restriction of 1.5 L.   - Lactate 2.4. Continue to trend  - Continue Lasix gtt (+/- central line and vasopressors/inotropes for BP support)

## 2025-02-08 NOTE — NURSING
Pt transferred to Cardiac unit via hospital bed by bedside RN and Charge RN. Handoff given to Cardiac unit bedside RN.

## 2025-02-09 LAB
ALBUMIN SERPL BCP-MCNC: 3.6 G/DL (ref 3.5–5.2)
ALBUMIN SERPL BCP-MCNC: 3.7 G/DL (ref 3.5–5.2)
ALP SERPL-CCNC: 177 U/L (ref 40–150)
ALT SERPL W/O P-5'-P-CCNC: 37 U/L (ref 10–44)
ANION GAP SERPL CALC-SCNC: 14 MMOL/L (ref 8–16)
ANION GAP SERPL CALC-SCNC: 15 MMOL/L (ref 8–16)
APTT PPP: 38.4 SEC (ref 21–32)
AST SERPL-CCNC: 36 U/L (ref 10–40)
BASOPHILS # BLD AUTO: 0.04 K/UL (ref 0–0.2)
BASOPHILS NFR BLD: 0.8 % (ref 0–1.9)
BILIRUB SERPL-MCNC: 1.4 MG/DL (ref 0.1–1)
BUN SERPL-MCNC: 60 MG/DL (ref 8–23)
BUN SERPL-MCNC: 61 MG/DL (ref 8–23)
CALCIUM SERPL-MCNC: 8.6 MG/DL (ref 8.7–10.5)
CALCIUM SERPL-MCNC: 8.8 MG/DL (ref 8.7–10.5)
CHLORIDE SERPL-SCNC: 102 MMOL/L (ref 95–110)
CHLORIDE SERPL-SCNC: 102 MMOL/L (ref 95–110)
CO2 SERPL-SCNC: 21 MMOL/L (ref 23–29)
CO2 SERPL-SCNC: 23 MMOL/L (ref 23–29)
CREAT SERPL-MCNC: 2.8 MG/DL (ref 0.5–1.4)
CREAT SERPL-MCNC: 3.2 MG/DL (ref 0.5–1.4)
DIFFERENTIAL METHOD BLD: ABNORMAL
EOSINOPHIL # BLD AUTO: 0.1 K/UL (ref 0–0.5)
EOSINOPHIL NFR BLD: 1.3 % (ref 0–8)
ERYTHROCYTE [DISTWIDTH] IN BLOOD BY AUTOMATED COUNT: 19.3 % (ref 11.5–14.5)
EST. GFR  (NO RACE VARIABLE): 19.4 ML/MIN/1.73 M^2
EST. GFR  (NO RACE VARIABLE): 22.8 ML/MIN/1.73 M^2
GLUCOSE SERPL-MCNC: 112 MG/DL (ref 70–110)
GLUCOSE SERPL-MCNC: 87 MG/DL (ref 70–110)
HCT VFR BLD AUTO: 31.7 % (ref 40–54)
HGB BLD-MCNC: 9.6 G/DL (ref 14–18)
IMM GRANULOCYTES # BLD AUTO: 0.01 K/UL (ref 0–0.04)
IMM GRANULOCYTES NFR BLD AUTO: 0.2 % (ref 0–0.5)
LACTATE SERPL-SCNC: 1.2 MMOL/L (ref 0.5–2.2)
LACTATE SERPL-SCNC: 1.6 MMOL/L (ref 0.5–2.2)
LYMPHOCYTES # BLD AUTO: 1 K/UL (ref 1–4.8)
LYMPHOCYTES NFR BLD: 18.7 % (ref 18–48)
MCH RBC QN AUTO: 27.4 PG (ref 27–31)
MCHC RBC AUTO-ENTMCNC: 30.3 G/DL (ref 32–36)
MCV RBC AUTO: 90 FL (ref 82–98)
MONOCYTES # BLD AUTO: 0.5 K/UL (ref 0.3–1)
MONOCYTES NFR BLD: 8.7 % (ref 4–15)
NEUTROPHILS # BLD AUTO: 3.7 K/UL (ref 1.8–7.7)
NEUTROPHILS NFR BLD: 70.3 % (ref 38–73)
NRBC BLD-RTO: 0 /100 WBC
PHOSPHATE SERPL-MCNC: 4 MG/DL (ref 2.7–4.5)
PLATELET # BLD AUTO: 123 K/UL (ref 150–450)
PMV BLD AUTO: 10.9 FL (ref 9.2–12.9)
POCT GLUCOSE: 110 MG/DL (ref 70–110)
POCT GLUCOSE: 120 MG/DL (ref 70–110)
POCT GLUCOSE: 121 MG/DL (ref 70–110)
POCT GLUCOSE: 150 MG/DL (ref 70–110)
POCT GLUCOSE: 170 MG/DL (ref 70–110)
POTASSIUM SERPL-SCNC: 3.2 MMOL/L (ref 3.5–5.1)
POTASSIUM SERPL-SCNC: 4 MMOL/L (ref 3.5–5.1)
PROT SERPL-MCNC: 6 G/DL (ref 6–8.4)
RBC # BLD AUTO: 3.51 M/UL (ref 4.6–6.2)
SODIUM SERPL-SCNC: 138 MMOL/L (ref 136–145)
SODIUM SERPL-SCNC: 139 MMOL/L (ref 136–145)
WBC # BLD AUTO: 5.29 K/UL (ref 3.9–12.7)

## 2025-02-09 PROCEDURE — 25000003 PHARM REV CODE 250: Mod: HCNC | Performed by: PHYSICIAN ASSISTANT

## 2025-02-09 PROCEDURE — 94761 N-INVAS EAR/PLS OXIMETRY MLT: CPT | Mod: HCNC

## 2025-02-09 PROCEDURE — 83605 ASSAY OF LACTIC ACID: CPT | Mod: 91,HCNC

## 2025-02-09 PROCEDURE — 25000003 PHARM REV CODE 250: Mod: HCNC

## 2025-02-09 PROCEDURE — 80069 RENAL FUNCTION PANEL: CPT | Mod: HCNC | Performed by: STUDENT IN AN ORGANIZED HEALTH CARE EDUCATION/TRAINING PROGRAM

## 2025-02-09 PROCEDURE — 83605 ASSAY OF LACTIC ACID: CPT | Mod: HCNC

## 2025-02-09 PROCEDURE — 25000003 PHARM REV CODE 250: Mod: HCNC | Performed by: INTERNAL MEDICINE

## 2025-02-09 PROCEDURE — 80053 COMPREHEN METABOLIC PANEL: CPT | Mod: HCNC | Performed by: PHYSICIAN ASSISTANT

## 2025-02-09 PROCEDURE — 25000003 PHARM REV CODE 250: Mod: HCNC | Performed by: STUDENT IN AN ORGANIZED HEALTH CARE EDUCATION/TRAINING PROGRAM

## 2025-02-09 PROCEDURE — 20000000 HC ICU ROOM: Mod: HCNC

## 2025-02-09 PROCEDURE — 63600175 PHARM REV CODE 636 W HCPCS: Mod: HCNC | Performed by: STUDENT IN AN ORGANIZED HEALTH CARE EDUCATION/TRAINING PROGRAM

## 2025-02-09 PROCEDURE — 85730 THROMBOPLASTIN TIME PARTIAL: CPT | Mod: HCNC | Performed by: INTERNAL MEDICINE

## 2025-02-09 PROCEDURE — 85025 COMPLETE CBC W/AUTO DIFF WBC: CPT | Mod: HCNC | Performed by: PHYSICIAN ASSISTANT

## 2025-02-09 PROCEDURE — 25000003 PHARM REV CODE 250: Mod: HCNC | Performed by: HOSPITALIST

## 2025-02-09 RX ORDER — POTASSIUM CHLORIDE 750 MG/1
50 CAPSULE, EXTENDED RELEASE ORAL ONCE
Status: COMPLETED | OUTPATIENT
Start: 2025-02-09 | End: 2025-02-09

## 2025-02-09 RX ORDER — GABAPENTIN 300 MG/1
300 CAPSULE ORAL ONCE
Status: COMPLETED | OUTPATIENT
Start: 2025-02-09 | End: 2025-02-09

## 2025-02-09 RX ORDER — ACETAMINOPHEN 325 MG/1
650 TABLET ORAL EVERY 6 HOURS PRN
Status: DISCONTINUED | OUTPATIENT
Start: 2025-02-09 | End: 2025-02-12 | Stop reason: HOSPADM

## 2025-02-09 RX ADMIN — GABAPENTIN 300 MG: 300 CAPSULE ORAL at 12:02

## 2025-02-09 RX ADMIN — SODIUM BICARBONATE 650 MG TABLET 1300 MG: at 07:02

## 2025-02-09 RX ADMIN — MIDODRINE HYDROCHLORIDE 10 MG: 5 TABLET ORAL at 08:02

## 2025-02-09 RX ADMIN — APIXABAN 5 MG: 5 TABLET, FILM COATED ORAL at 12:02

## 2025-02-09 RX ADMIN — ACETAMINOPHEN 650 MG: 325 TABLET ORAL at 01:02

## 2025-02-09 RX ADMIN — POTASSIUM CHLORIDE 50 MEQ: 750 CAPSULE, EXTENDED RELEASE ORAL at 07:02

## 2025-02-09 RX ADMIN — AMIODARONE HYDROCHLORIDE 200 MG: 200 TABLET ORAL at 07:02

## 2025-02-09 RX ADMIN — BRIMONIDINE TARTRATE 1 DROP: 2 SOLUTION OPHTHALMIC at 08:02

## 2025-02-09 RX ADMIN — FUROSEMIDE 20 MG/HR: 10 INJECTION, SOLUTION INTRAMUSCULAR; INTRAVENOUS at 05:02

## 2025-02-09 RX ADMIN — ALLOPURINOL 150 MG: 100 TABLET ORAL at 08:02

## 2025-02-09 RX ADMIN — AMIODARONE HYDROCHLORIDE 200 MG: 200 TABLET ORAL at 08:02

## 2025-02-09 RX ADMIN — TAMSULOSIN HYDROCHLORIDE 0.4 MG: 0.4 CAPSULE ORAL at 08:02

## 2025-02-09 RX ADMIN — MUPIROCIN: 20 OINTMENT TOPICAL at 07:02

## 2025-02-09 RX ADMIN — MIDODRINE HYDROCHLORIDE 10 MG: 5 TABLET ORAL at 04:02

## 2025-02-09 RX ADMIN — ASPIRIN 81 MG: 81 TABLET, COATED ORAL at 08:02

## 2025-02-09 RX ADMIN — APIXABAN 5 MG: 5 TABLET, FILM COATED ORAL at 07:02

## 2025-02-09 RX ADMIN — HYDROXYZINE HYDROCHLORIDE 25 MG: 25 TABLET, FILM COATED ORAL at 05:02

## 2025-02-09 RX ADMIN — MUPIROCIN: 20 OINTMENT TOPICAL at 08:02

## 2025-02-09 RX ADMIN — MIDODRINE HYDROCHLORIDE 10 MG: 5 TABLET ORAL at 12:02

## 2025-02-09 RX ADMIN — BRIMONIDINE TARTRATE 1 DROP: 2 SOLUTION OPHTHALMIC at 07:02

## 2025-02-09 RX ADMIN — SODIUM BICARBONATE 650 MG TABLET 1300 MG: at 08:02

## 2025-02-09 NOTE — ASSESSMENT & PLAN NOTE
Creatine stable for now. BMP reviewed- noted Estimated Creatinine Clearance: 21.9 mL/min (A) (based on SCr of 2.8 mg/dL (H)). according to latest data. Based on current GFR, CKD stage is stage 4 - GFR 15-29.  Monitor UOP and serial BMP and adjust therapy as needed. Renally dose meds. Avoid nephrotoxic medications and procedures.

## 2025-02-09 NOTE — ASSESSMENT & PLAN NOTE
Patient has paroxysmal (<7 days) atrial fibrillation. Patient is currently in sinus rhythm. [id number] Score: 4. The patients heart rate in the last 24 hours is as follows:  Pulse  Min: 61  Max: 84     amiodarone tablet 200 mg, 2 times daily, Oral  Eliquis 5mg bid   Plan  - Replete lytes with a goal of K>4, Mg >2  - Patient is anticoagulated, see medications listed above.  - Last ILR showing 16% burden of AF, if ischemic and valvular workup for drop in EF negative can consider arrhythmia as culprit. Continue to follow up with EP as an outpatient

## 2025-02-09 NOTE — ASSESSMENT & PLAN NOTE
Patient's FSGs are uncontrolled due to hyperglycemia on current medication regimen.  Last A1c reviewed-   Lab Results   Component Value Date    HGBA1C 6.5 (H) 12/27/2024     Most recent fingerstick glucose reviewed-   Recent Labs   Lab 02/08/25  1739 02/08/25  2120 02/09/25  0548 02/09/25  0820   POCTGLUCOSE 137* 145* 150* 121*       Current correctional scale  Low  Maintain anti-hyperglycemic dose as follows-   Antihyperglycemics (From admission, onward)    Start     Stop Route Frequency Ordered    02/04/25 2003  insulin aspart U-100 pen 0-5 Units         -- SubQ Before meals & nightly PRN 02/04/25 1903        Hold Oral hypoglycemics while patient is in the hospital.

## 2025-02-09 NOTE — SUBJECTIVE & OBJECTIVE
Interval History: Patient is feeling better today. Yesterday patient was rebolused and increase lasix to 20mg/hr. Urine output 1.6L. Lactate 1.6 and creatinine 2.8 from 2.6    Review of Systems   Constitutional: Negative for malaise/fatigue.   Cardiovascular:  Negative for chest pain, dyspnea on exertion, irregular heartbeat, orthopnea, palpitations and paroxysmal nocturnal dyspnea.   Respiratory:  Negative for cough and shortness of breath.    Endocrine: Negative.    Gastrointestinal: Negative.    Genitourinary: Negative.    Neurological:  Negative for dizziness and weakness.   Psychiatric/Behavioral: Negative.     All other systems reviewed and are negative.    Objective:     Vital Signs (Most Recent):  Temp: 97.7 °F (36.5 °C) (02/09/25 0700)  Pulse: 84 (02/09/25 0700)  Resp: 18 (02/09/25 0700)  BP: 118/77 (02/09/25 0700)  SpO2: 100 % (02/09/25 0700) Vital Signs (24h Range):  Temp:  [96.7 °F (35.9 °C)-98.3 °F (36.8 °C)] 97.7 °F (36.5 °C)  Pulse:  [61-84] 84  Resp:  [12-44] 18  SpO2:  [91 %-100 %] 100 %  BP: ()/(52-78) 118/77     Weight: 68 kg (150 lb)  Body mass index is 19.26 kg/m².     SpO2: 100 %         Intake/Output Summary (Last 24 hours) at 2/9/2025 0905  Last data filed at 2/9/2025 0800  Gross per 24 hour   Intake 346.14 ml   Output 2875 ml   Net -2528.86 ml       Lines/Drains/Airways       Drain  Duration                  Ileostomy 01/01/84 RUQ 77717 days              Peripheral Intravenous Line  Duration                  Peripheral IV - Single Lumen 02/03/25 1034 20 G Posterior;Right Forearm 5 days         Peripheral IV - Single Lumen 02/08/25 1700 20 G Anterior;Left;Proximal Forearm <1 day                       Physical Exam  Vitals reviewed.   Constitutional:       Appearance: Normal appearance.   HENT:      Head: Normocephalic.      Mouth/Throat:      Mouth: Mucous membranes are moist.   Eyes:      Extraocular Movements: Extraocular movements intact.   Cardiovascular:      Rate and Rhythm:  Normal rate and regular rhythm.      Heart sounds: No murmur heard.  Pulmonary:      Effort: Pulmonary effort is normal.      Breath sounds: Normal breath sounds.   Abdominal:      General: Abdomen is flat.      Palpations: Abdomen is soft.   Musculoskeletal:      Right lower leg: Edema present.      Left lower leg: Edema present.   Skin:     General: Skin is warm.   Neurological:      General: No focal deficit present.      Mental Status: He is alert and oriented to person, place, and time.   Psychiatric:         Mood and Affect: Mood normal.         Behavior: Behavior normal.            Significant Labs: All pertinent lab results from the last 24 hours have been reviewed.

## 2025-02-09 NOTE — ASSESSMENT & PLAN NOTE
CAD s/p PCI (mLAD 2017)   - Continue home ASA  -Given patient new drop in EF he will need to undergo ischemic workup as an outpatient with PET

## 2025-02-09 NOTE — PROGRESS NOTES
[facility] - Card[facility]  Cardiology  Progress Note    Patient Name: Jarrett Charlton Jr.  MRN: [id number]  Admission Date: 2/3/2025  Hospital Length of Stay: 5 days  Code Status: Full Code   Attending Physician: Russell Gamboa MD   Primary Care Physician: Tripp Mohan MD  Expected Discharge Date: 2/10/2025  Principal Problem:Acute on chronic congestive heart failure    Subjective:     Hospital Course:   No notes on file    Interval History: Patient is feeling better today. Yesterday patient was rebolused and increase lasix to 20mg/hr. Urine output 1.6L. Lactate 1.6 and creatinine 2.8 from 2.6    Review of Systems   Constitutional: Negative for malaise/fatigue.   Cardiovascular:  Negative for chest pain, dyspnea on exertion, irregular heartbeat, orthopnea, palpitations and paroxysmal nocturnal dyspnea.   Respiratory:  Negative for cough and shortness of breath.    Endocrine: Negative.    Gastrointestinal: Negative.    Genitourinary: Negative.    Neurological:  Negative for dizziness and weakness.   Psychiatric/Behavioral: Negative.     All other systems reviewed and are negative.    Objective:     Vital Signs (Most Recent):  Temp: 97.7 °F (36.5 °C) (02/09/25 0700)  Pulse: 84 (02/09/25 0700)  Resp: 18 (02/09/25 0700)  BP: 118/77 (02/09/25 0700)  SpO2: 100 % (02/09/25 0700) Vital Signs (24h Range):  Temp:  [96.7 °F (35.9 °C)-98.3 °F (36.8 °C)] 97.7 °F (36.5 °C)  Pulse:  [61-84] 84  Resp:  [12-44] 18  SpO2:  [91 %-100 %] 100 %  BP: ()/(52-78) 118/77     Weight: 68 kg (150 lb)  Body mass index is 19.26 kg/m².     SpO2: 100 %         Intake/Output Summary (Last 24 hours) at 2/9/2025 0905  Last data filed at 2/9/2025 0800  Gross per 24 hour   Intake 346.14 ml   Output 2875 ml   Net -2528.86 ml       Lines/Drains/Airways       Drain  Duration                  Ileostomy 01/01/84 RUQ 10421 days              Peripheral Intravenous Line  Duration                  Peripheral IV - Single Lumen 02/03/25 1034 20  G Posterior;Right Forearm 5 days         Peripheral IV - Single Lumen 02/08/25 1700 20 G Anterior;Left;Proximal Forearm <1 day                       Physical Exam  Vitals reviewed.   Constitutional:       Appearance: Normal appearance.   HENT:      Head: Normocephalic.      Mouth/Throat:      Mouth: Mucous membranes are moist.   Eyes:      Extraocular Movements: Extraocular movements intact.   Cardiovascular:      Rate and Rhythm: Normal rate and regular rhythm.      Heart sounds: No murmur heard.  Pulmonary:      Effort: Pulmonary effort is normal.      Breath sounds: Normal breath sounds.   Abdominal:      General: Abdomen is flat.      Palpations: Abdomen is soft.   Musculoskeletal:      Right lower leg: Edema present.      Left lower leg: Edema present.   Skin:     General: Skin is warm.   Neurological:      General: No focal deficit present.      Mental Status: He is alert and oriented to person, place, and time.   Psychiatric:         Mood and Affect: Mood normal.         Behavior: Behavior normal.            Significant Labs: All pertinent lab results from the last 24 hours have been reviewed.      Assessment and Plan:       * Acute on chronic congestive heart failure  Patient admitted with acute on chronic heart failure exacerbation complicated by hypotension limiting diuresis. Has received fluid, albumin, and midodrine for BP support. Minimal charting of UOP and weight trend. TR and MR worse on most recent TTE though possibly due to volume overload rather than progression of disease and may be reversible. If remains severe MR and clinically improves may be candidate for cherie-clip. At this time, patient requires aggressive diuresis given deteriorating clinical status which is likely to require inotrope or vasopressor support given BP. He was transferred to CCU for further management.     Current Heart Failure Medications  furosemide (Lasix) 500 mg in 50 mL infusion (conc: 10 mg/mL), Continuous,  "Intravenous      Plan  - Monitor strict I&Os and daily weights.    - Place on telemetry  - Low sodium diet  - Place on fluid restriction of 1.5 L.   - Lactate 2.4 trended down to 1.4  - Continue Lasix gtt       Atelectasis  I have personally reviewed Chest X-ray. Patient with atelectasis on interpretation. Likely  compressive atelectasis . Signs and symptoms include Shortness of breath Will begin treatment with incentive spirometry and upright positioning.    Pancreatic mass  CT abd w contrast reviewed: "There is a subtle low attenuating lesion at the pancreatic head measuring 1.1 cm, stable noting fatty atrophy of the pancreas without pancreatic ductal dilation."  - [facility] consulted, recommend against ERCP at this time. Plan for follow up in clinic with imaging.   - referral to [facility] at [facility]    History of DVT (deep vein thrombosis)  U/S - negative for DVT. Prior known L greater saphenous vein nonocclusive thrombus resolved.     Plan:  - C/W eliquis    Paroxysmal atrial fibrillation  Patient has paroxysmal (<7 days) atrial fibrillation. Patient is currently in sinus rhythm. [id number] Score: 4. The patients heart rate in the last 24 hours is as follows:  Pulse  Min: 61  Max: 84     amiodarone tablet 200 mg, 2 times daily, Oral  Eliquis 5mg bid   Plan  - Replete lytes with a goal of K>4, Mg >2  - Patient is anticoagulated, see medications listed above.  - Last ILR showing 16% burden of AF, if ischemic and valvular workup for drop in EF negative can consider arrhythmia as culprit. Continue to follow up with EP as an outpatient         Pulmonary hypertension  - diuresis as above    Ileostomy in place  Pt reporting decreased ostomy output, reports no flatulence in past 3 days. CT abdomen performed and reviewed: "Surgical change of colectomy, noting some slow flow proximal to the ostomy without obstruction."  - pt tolerating full diet. However, if change in PO intake would consider further evaluation.   - continue close " monitoring of bowel function and ostomy   - now having normal ostomy output    Hypotension  Patient presented with hypotension on 2/6, which improved after administration of home midodrine, allowing continuation of Lasix and BB. Despite multiple doses of albumin on 2/8, hypotension persisted, prompting a cardiology consult.    CAD (coronary artery disease)  CAD s/p PCI (mLAD 2017)   - Continue home ASA  -Given patient new drop in EF he will need to undergo ischemic workup as an outpatient with PET     History of ulcerative colitis  Stable. s/p colectomy and ileostomy      GERD (gastroesophageal reflux disease)  Continue home protonix     BPH (benign prostatic hyperplasia)  s/p TURP  Continue home tamsulosin    Chronic renal impairment, stage 4 (severe)  Creatine stable for now. BMP reviewed- noted Estimated Creatinine Clearance: 21.9 mL/min (A) (based on SCr of 2.8 mg/dL (H)). according to latest data. Based on current GFR, CKD stage is stage 4 - GFR 15-29.  Monitor UOP and serial BMP and adjust therapy as needed. Renally dose meds. Avoid nephrotoxic medications and procedures.    T2DM (type 2 diabetes mellitus)  Patient's FSGs are uncontrolled due to hyperglycemia on current medication regimen.  Last A1c reviewed-   Lab Results   Component Value Date    HGBA1C 6.5 (H) 12/27/2024     Most recent fingerstick glucose reviewed-   Recent Labs   Lab 02/08/25  1739 02/08/25  2120 02/09/25  0548 02/09/25  0820   POCTGLUCOSE 137* 145* 150* 121*       Current correctional scale  Low  Maintain anti-hyperglycemic dose as follows-   Antihyperglycemics (From admission, onward)      Start     Stop Route Frequency Ordered    02/04/25 2003  insulin aspart U-100 pen 0-5 Units         -- SubQ Before meals & nightly PRN 02/04/25 1903          Hold Oral hypoglycemics while patient is in the hospital.        VTE Risk Mitigation (From admission, onward)           Ordered     apixaban tablet 5 mg  2 times daily         02/09/25 1107                     Nely Perea MD  Cardiology  [facility] - [facility]

## 2025-02-09 NOTE — ASSESSMENT & PLAN NOTE
Patient admitted with acute on chronic heart failure exacerbation complicated by hypotension limiting diuresis. Has received fluid, albumin, and midodrine for BP support. Minimal charting of UOP and weight trend. TR and MR worse on most recent TTE though possibly due to volume overload rather than progression of disease and may be reversible. If remains severe MR and clinically improves may be candidate for cherie-clip. At this time, patient requires aggressive diuresis given deteriorating clinical status which is likely to require inotrope or vasopressor support given BP. He was transferred to CCU for further management.     Current Heart Failure Medications  furosemide (Lasix) 500 mg in 50 mL infusion (conc: 10 mg/mL), Continuous, Intravenous      Plan  - Monitor strict I&Os and daily weights.    - Place on telemetry  - Low sodium diet  - Place on fluid restriction of 1.5 L.   - Lactate 2.4 trended down to 1.4  - Continue Lasix gtt

## 2025-02-10 LAB
ALBUMIN SERPL BCP-MCNC: 3.7 G/DL (ref 3.5–5.2)
ALP SERPL-CCNC: 159 U/L (ref 40–150)
ALT SERPL W/O P-5'-P-CCNC: 32 U/L (ref 10–44)
ANION GAP SERPL CALC-SCNC: 11 MMOL/L (ref 8–16)
ANION GAP SERPL CALC-SCNC: 13 MMOL/L (ref 8–16)
AST SERPL-CCNC: 19 U/L (ref 10–40)
BASOPHILS # BLD AUTO: 0.02 K/UL (ref 0–0.2)
BASOPHILS NFR BLD: 0.4 % (ref 0–1.9)
BILIRUB SERPL-MCNC: 1.2 MG/DL (ref 0.1–1)
BUN SERPL-MCNC: 52 MG/DL (ref 8–23)
BUN SERPL-MCNC: 55 MG/DL (ref 8–23)
CALCIUM SERPL-MCNC: 8.6 MG/DL (ref 8.7–10.5)
CALCIUM SERPL-MCNC: 8.8 MG/DL (ref 8.7–10.5)
CHLORIDE SERPL-SCNC: 102 MMOL/L (ref 95–110)
CHLORIDE SERPL-SCNC: 104 MMOL/L (ref 95–110)
CO2 SERPL-SCNC: 25 MMOL/L (ref 23–29)
CO2 SERPL-SCNC: 27 MMOL/L (ref 23–29)
CREAT SERPL-MCNC: 2.5 MG/DL (ref 0.5–1.4)
CREAT SERPL-MCNC: 2.7 MG/DL (ref 0.5–1.4)
DIFFERENTIAL METHOD BLD: ABNORMAL
EOSINOPHIL # BLD AUTO: 0.1 K/UL (ref 0–0.5)
EOSINOPHIL NFR BLD: 1.1 % (ref 0–8)
ERYTHROCYTE [DISTWIDTH] IN BLOOD BY AUTOMATED COUNT: 19.4 % (ref 11.5–14.5)
EST. GFR  (NO RACE VARIABLE): 23.8 ML/MIN/1.73 M^2
EST. GFR  (NO RACE VARIABLE): 26.1 ML/MIN/1.73 M^2
GLUCOSE SERPL-MCNC: 139 MG/DL (ref 70–110)
GLUCOSE SERPL-MCNC: 149 MG/DL (ref 70–110)
HCT VFR BLD AUTO: 33 % (ref 40–54)
HGB BLD-MCNC: 10.2 G/DL (ref 14–18)
IMM GRANULOCYTES # BLD AUTO: 0.02 K/UL (ref 0–0.04)
IMM GRANULOCYTES NFR BLD AUTO: 0.4 % (ref 0–0.5)
LYMPHOCYTES # BLD AUTO: 0.6 K/UL (ref 1–4.8)
LYMPHOCYTES NFR BLD: 11.6 % (ref 18–48)
MAGNESIUM SERPL-MCNC: 1.6 MG/DL (ref 1.6–2.6)
MAGNESIUM SERPL-MCNC: 2.9 MG/DL (ref 1.6–2.6)
MCH RBC QN AUTO: 27.5 PG (ref 27–31)
MCHC RBC AUTO-ENTMCNC: 30.9 G/DL (ref 32–36)
MCV RBC AUTO: 89 FL (ref 82–98)
MONOCYTES # BLD AUTO: 0.4 K/UL (ref 0.3–1)
MONOCYTES NFR BLD: 8.2 % (ref 4–15)
NEUTROPHILS # BLD AUTO: 4.1 K/UL (ref 1.8–7.7)
NEUTROPHILS NFR BLD: 78.3 % (ref 38–73)
NRBC BLD-RTO: 0 /100 WBC
PHOSPHATE SERPL-MCNC: 3.2 MG/DL (ref 2.7–4.5)
PLATELET # BLD AUTO: 132 K/UL (ref 150–450)
PMV BLD AUTO: 10.2 FL (ref 9.2–12.9)
POCT GLUCOSE: 137 MG/DL (ref 70–110)
POCT GLUCOSE: 168 MG/DL (ref 70–110)
POCT GLUCOSE: 248 MG/DL (ref 70–110)
POCT GLUCOSE: 268 MG/DL (ref 70–110)
POTASSIUM SERPL-SCNC: 3.4 MMOL/L (ref 3.5–5.1)
POTASSIUM SERPL-SCNC: 3.4 MMOL/L (ref 3.5–5.1)
POTASSIUM SERPL-SCNC: 4.1 MMOL/L (ref 3.5–5.1)
PROT SERPL-MCNC: 6.2 G/DL (ref 6–8.4)
RBC # BLD AUTO: 3.71 M/UL (ref 4.6–6.2)
SODIUM SERPL-SCNC: 140 MMOL/L (ref 136–145)
SODIUM SERPL-SCNC: 142 MMOL/L (ref 136–145)
WBC # BLD AUTO: 5.25 K/UL (ref 3.9–12.7)

## 2025-02-10 PROCEDURE — 94761 N-INVAS EAR/PLS OXIMETRY MLT: CPT | Mod: HCNC

## 2025-02-10 PROCEDURE — 83735 ASSAY OF MAGNESIUM: CPT | Mod: 91,HCNC | Performed by: INTERNAL MEDICINE

## 2025-02-10 PROCEDURE — 25000003 PHARM REV CODE 250: Mod: HCNC | Performed by: PHYSICIAN ASSISTANT

## 2025-02-10 PROCEDURE — 25000003 PHARM REV CODE 250: Mod: HCNC | Performed by: HOSPITALIST

## 2025-02-10 PROCEDURE — 25000003 PHARM REV CODE 250: Mod: HCNC | Performed by: INTERNAL MEDICINE

## 2025-02-10 PROCEDURE — 63600175 PHARM REV CODE 636 W HCPCS: Mod: HCNC | Performed by: INTERNAL MEDICINE

## 2025-02-10 PROCEDURE — 63600175 PHARM REV CODE 636 W HCPCS: Mod: HCNC | Performed by: STUDENT IN AN ORGANIZED HEALTH CARE EDUCATION/TRAINING PROGRAM

## 2025-02-10 PROCEDURE — 84100 ASSAY OF PHOSPHORUS: CPT | Mod: HCNC | Performed by: STUDENT IN AN ORGANIZED HEALTH CARE EDUCATION/TRAINING PROGRAM

## 2025-02-10 PROCEDURE — 63600175 PHARM REV CODE 636 W HCPCS: Mod: HCNC

## 2025-02-10 PROCEDURE — 83735 ASSAY OF MAGNESIUM: CPT | Mod: HCNC | Performed by: STUDENT IN AN ORGANIZED HEALTH CARE EDUCATION/TRAINING PROGRAM

## 2025-02-10 PROCEDURE — 25000003 PHARM REV CODE 250: Mod: HCNC | Performed by: STUDENT IN AN ORGANIZED HEALTH CARE EDUCATION/TRAINING PROGRAM

## 2025-02-10 PROCEDURE — 80053 COMPREHEN METABOLIC PANEL: CPT | Mod: HCNC | Performed by: PHYSICIAN ASSISTANT

## 2025-02-10 PROCEDURE — 25000003 PHARM REV CODE 250: Mod: HCNC

## 2025-02-10 PROCEDURE — 85025 COMPLETE CBC W/AUTO DIFF WBC: CPT | Mod: HCNC | Performed by: INTERNAL MEDICINE

## 2025-02-10 PROCEDURE — 20000000 HC ICU ROOM: Mod: HCNC

## 2025-02-10 PROCEDURE — 84132 ASSAY OF SERUM POTASSIUM: CPT | Mod: HCNC | Performed by: INTERNAL MEDICINE

## 2025-02-10 PROCEDURE — 80048 BASIC METABOLIC PNL TOTAL CA: CPT | Mod: HCNC,XB | Performed by: STUDENT IN AN ORGANIZED HEALTH CARE EDUCATION/TRAINING PROGRAM

## 2025-02-10 RX ORDER — MAGNESIUM SULFATE HEPTAHYDRATE 40 MG/ML
2 INJECTION, SOLUTION INTRAVENOUS ONCE
Status: COMPLETED | OUTPATIENT
Start: 2025-02-10 | End: 2025-02-10

## 2025-02-10 RX ORDER — POTASSIUM CHLORIDE 20 MEQ/1
40 TABLET, EXTENDED RELEASE ORAL ONCE
Status: COMPLETED | OUTPATIENT
Start: 2025-02-10 | End: 2025-02-10

## 2025-02-10 RX ORDER — MIDODRINE HYDROCHLORIDE 5 MG/1
5 TABLET ORAL
Status: DISCONTINUED | OUTPATIENT
Start: 2025-02-10 | End: 2025-02-12 | Stop reason: HOSPADM

## 2025-02-10 RX ORDER — PREDNISONE 20 MG/1
40 TABLET ORAL DAILY
Status: DISCONTINUED | OUTPATIENT
Start: 2025-02-10 | End: 2025-02-12 | Stop reason: HOSPADM

## 2025-02-10 RX ORDER — POTASSIUM CHLORIDE 750 MG/1
50 CAPSULE, EXTENDED RELEASE ORAL ONCE
Status: COMPLETED | OUTPATIENT
Start: 2025-02-10 | End: 2025-02-10

## 2025-02-10 RX ADMIN — HYDROXYZINE HYDROCHLORIDE 25 MG: 25 TABLET, FILM COATED ORAL at 10:02

## 2025-02-10 RX ADMIN — TAMSULOSIN HYDROCHLORIDE 0.4 MG: 0.4 CAPSULE ORAL at 08:02

## 2025-02-10 RX ADMIN — SODIUM BICARBONATE 650 MG TABLET 1300 MG: at 08:02

## 2025-02-10 RX ADMIN — MIDODRINE HYDROCHLORIDE 5 MG: 5 TABLET ORAL at 11:02

## 2025-02-10 RX ADMIN — MAGNESIUM SULFATE HEPTAHYDRATE 2 G: 40 INJECTION, SOLUTION INTRAVENOUS at 06:02

## 2025-02-10 RX ADMIN — FUROSEMIDE 20 MG/HR: 10 INJECTION, SOLUTION INTRAMUSCULAR; INTRAVENOUS at 08:02

## 2025-02-10 RX ADMIN — ALLOPURINOL 150 MG: 100 TABLET ORAL at 08:02

## 2025-02-10 RX ADMIN — ACETAMINOPHEN 650 MG: 325 TABLET ORAL at 06:02

## 2025-02-10 RX ADMIN — POTASSIUM CHLORIDE 40 MEQ: 1500 TABLET, EXTENDED RELEASE ORAL at 06:02

## 2025-02-10 RX ADMIN — MUPIROCIN: 20 OINTMENT TOPICAL at 08:02

## 2025-02-10 RX ADMIN — MIDODRINE HYDROCHLORIDE 5 MG: 5 TABLET ORAL at 05:02

## 2025-02-10 RX ADMIN — BRIMONIDINE TARTRATE 1 DROP: 2 SOLUTION OPHTHALMIC at 08:02

## 2025-02-10 RX ADMIN — ACETAMINOPHEN 650 MG: 325 TABLET ORAL at 05:02

## 2025-02-10 RX ADMIN — INSULIN ASPART 2 UNITS: 100 INJECTION, SOLUTION INTRAVENOUS; SUBCUTANEOUS at 11:02

## 2025-02-10 RX ADMIN — FAMOTIDINE 20 MG: 20 TABLET ORAL at 08:02

## 2025-02-10 RX ADMIN — HYDROXYZINE HYDROCHLORIDE 25 MG: 25 TABLET, FILM COATED ORAL at 08:02

## 2025-02-10 RX ADMIN — COLCHICINE 0.6 MG: 0.6 TABLET ORAL at 08:02

## 2025-02-10 RX ADMIN — MAGNESIUM SULFATE HEPTAHYDRATE 2 G: 40 INJECTION, SOLUTION INTRAVENOUS at 08:02

## 2025-02-10 RX ADMIN — PREDNISONE 40 MG: 20 TABLET ORAL at 05:02

## 2025-02-10 RX ADMIN — ASPIRIN 81 MG: 81 TABLET, COATED ORAL at 08:02

## 2025-02-10 RX ADMIN — AMIODARONE HYDROCHLORIDE 200 MG: 200 TABLET ORAL at 08:02

## 2025-02-10 RX ADMIN — POTASSIUM CHLORIDE 50 MEQ: 750 CAPSULE, EXTENDED RELEASE ORAL at 06:02

## 2025-02-10 RX ADMIN — MIDODRINE HYDROCHLORIDE 5 MG: 5 TABLET ORAL at 08:02

## 2025-02-10 RX ADMIN — APIXABAN 5 MG: 5 TABLET, FILM COATED ORAL at 08:02

## 2025-02-10 RX ADMIN — INSULIN ASPART 1 UNITS: 100 INJECTION, SOLUTION INTRAVENOUS; SUBCUTANEOUS at 08:02

## 2025-02-10 NOTE — ASSESSMENT & PLAN NOTE
U/S - negative for DVT. Prior known L greater saphenous vein nonocclusive thrombus resolved.     Plan:  - C/W eliquis

## 2025-02-10 NOTE — ASSESSMENT & PLAN NOTE
Patient has paroxysmal (<7 days) atrial fibrillation. Patient is currently in sinus rhythm. [id number] Score: 4. The patients heart rate in the last 24 hours is as follows:  Pulse  Min: 64  Max: 84     amiodarone tablet 200 mg, 2 times daily, Oral  Eliquis 5mg bid     Plan  - Replete lytes with a goal of K>4, Mg >2  - Patient is anticoagulated, see medications listed above.  - Last ILR showing 16% burden of AF, if ischemic and valvular workup for drop in EF negative can consider arrhythmia as culprit. Continue to follow up with EP as an outpatient

## 2025-02-10 NOTE — PROGRESS NOTES
[facility]  Cardiology  Progress Note    Patient Name: Jarrett Charlton Jr.  MRN: [id number]  Admission Date: 2/3/2025  Hospital Length of Stay: 6 days  Code Status: Full Code   Attending Physician: Russell Gamboa MD   Primary Care Physician: Tripp Mohan MD  Expected Discharge Date: 2/11/2025  Principal Problem:Acute on chronic congestive heart failure    Subjective:     Hospital Course:   Patient admitted with acute on chronic HF exacerbation complicated by hypotension limiting diuresis. Has received fluid, albumin, and midodrine for BP support. Minimal charting of UOP and weight trend. TR and MR worse on most recent TTE though possibly due to volume overload rather than progression of disease and may be reversible. If remains severe MR and clinically improves may be candidate for cherie-clip. Patient tolerating midodrine and lasix well. Will continue to titrate down to home dose. Will continue to track UOP and weights and transition to PO lasix when able.     Interval History: Patient is feeling better today. K+ 3.4, Mg 1.6 both repleted this morning.  BMP for 3pm to monitor. Since MAP in the 90s, Midodrine has been cut to 5 since he was taking 2.5 at home. Not on presssors and lactate WNL so possible stepdown to  if can tolerate Midodrine. Discuss Prednisone regarding gout. Decrease lasix gtt to 10 and then plan to transition to PO Lasix 60mg BID tomorrow.    ROS  Objective:     Vital Signs (Most Recent):  Temp: 98.3 °F (36.8 °C) (02/10/25 1101)  Pulse: 75 (02/10/25 1200)  Resp: (!) 21 (02/10/25 1200)  BP: 119/65 (02/10/25 1200)  SpO2: 99 % (02/10/25 1200) Vital Signs (24h Range):  Temp:  [97.9 °F (36.6 °C)-98.3 °F (36.8 °C)] 98.3 °F (36.8 °C)  Pulse:  [64-84] 75  Resp:  [14-40] 21  SpO2:  [94 %-100 %] 99 %  BP: ()/(56-76) 119/65     Weight: 68 kg (150 lb)  Body mass index is 19.26 kg/m².     SpO2: 99 %         Intake/Output Summary (Last 24 hours) at 2/10/2025 1246  Last data  "filed at 2/10/2025 1101  Gross per 24 hour   Intake 1046.63 ml   Output 3150 ml   Net -2103.37 ml       Lines/Drains/Airways       Drain  Duration                  Ileostomy 01/01/84 RUQ [id number] days              Peripheral Intravenous Line  Duration                  Peripheral IV - Single Lumen 02/03/25 1034 20 G Posterior;Right Forearm 7 days         Peripheral IV - Single Lumen 02/09/25 1930 18 G Left;Anterior Forearm <1 day                       Physical Exam  Constitutional:       General: He is not in acute distress.  Cardiovascular:      Rate and Rhythm: Normal rate.      Pulses: Normal pulses.      Heart sounds: Normal heart sounds.   Pulmonary:      Effort: Pulmonary effort is normal. No respiratory distress.      Breath sounds: No wheezing.   Abdominal:      General: Abdomen is flat. There is no distension.      Palpations: Abdomen is soft.      Tenderness: There is no abdominal tenderness.   Musculoskeletal:      Right lower leg: No edema.      Left lower leg: No edema.   Neurological:      Mental Status: He is alert.            Significant Labs: ABG: No results for input(s): "PH", "PCO2", "HCO3", "POCSATURATED", "BE" in the last 48 hours., Blood Culture: No results for input(s): "LABBLOO" in the last 48 hours., BMP:   Recent Labs   Lab 02/09/25  0259 02/09/25  1657 02/10/25  0310 02/10/25  1152   GLU 87 112* 139*  --     139 140  --    K 4.0 3.2* 3.4* 4.1    102 102  --    CO2 21* 23 25  --    BUN 60* 61* 55*  --    CREATININE 2.8* 3.2* 2.7*  --    CALCIUM 8.6* 8.8 8.8  --    MG  --   --  1.6 2.9*   , CMP   Recent Labs   Lab 02/09/25  0259 02/09/25  1657 02/10/25  0310 02/10/25  1152    139 140  --    K 4.0 3.2* 3.4* 4.1    102 102  --    CO2 21* 23 25  --    GLU 87 112* 139*  --    BUN 60* 61* 55*  --    CREATININE 2.8* 3.2* 2.7*  --    CALCIUM 8.6* 8.8 8.8  --    PROT 6.0  --  6.2  --    ALBUMIN 3.6 3.7 3.7  --    BILITOT 1.4*  --  1.2*  --    ALKPHOS 177*  --  159*  --    AST 36  " "--  19  --    ALT 37  --  32  --    ANIONGAP 15 14 13  --    , CBC   Recent Labs   Lab 02/08/25  1541 02/09/25  0259 02/10/25  0310   WBC 3.71* 5.29 5.25   HGB 10.0* 9.6* 10.2*   HCT 33.6* 31.7* 33.0*   PLT 88* 123* 132*   , Lipid Panel No results for input(s): "CHOL", "HDL", "LDLCALC", "TRIG", "CHOLHDL" in the last 48 hours., and Troponin No results for input(s): "TROPONINIHS" in the last 48 hours.    Significant Imaging:   CXR 2/9/25:  When compared to prior radiograph of 02/04/2025, 14:34 hours, relative similar bibasilar opacities and small bilateral pleural effusions.     Echo    Result Date: 1/24/2025    Left Ventricle: The left ventricle is mildly dilated. Ventricular mass   is normal. Normal wall thickness. Severe global hypokinesis present. There   is severely reduced systolic function. Ejection fraction is approximately   30%. Grade I diastolic dysfunction.    Right Ventricle: Normal right ventricular cavity size. Wall thickness   is normal. Right ventricle wall motion has global hypokinesis. Systolic   function is moderately reduced.    Left Atrium: Left atrium is severely dilated.    Right Atrium: Right atrium is moderately dilated.    Aortic Valve: The aortic valve is a trileaflet valve. There is mild   aortic valve sclerosis. There is mild aortic regurgitation with a   centrally directed jet.    Mitral Valve: There is severe regurgitation with a centrally directed   jet.    Tricuspid Valve: There is moderate to severe regurgitation with a   centrally directed jet.    Pulmonary Artery: There is mild to moderate pulmonary hypertension. The   estimated pulmonary artery systolic pressure is 42 mmHg.    IVC/SVC: Elevated venous pressure at 15 mmHg.    Pericardium: There is a small circumferential effusion. Left pleural   effusion.        Echo Saline Bubble? No    Result Date: 8/5/2024    Left Ventricle: The left ventricle is normal in size. Moderately   increased wall thickness. There is concentric " hypertrophy. Moderate global   hypokinesis present. There is moderately reduced systolic function.   Ejection fraction by visual approximation is 35%. Grade II diastolic   dysfunction. Elevated left ventricular filling pressure. Tissue Doppler   velocity is reduced.    Right Ventricle: Normal right ventricular cavity size. Wall thickness   is normal. Systolic function is normal.    Left Atrium: Left atrium is severely dilated.    Right Atrium: Right atrium is moderately dilated.    Aortic Valve: There is mild aortic valve sclerosis. Mildly restricted   motion.    Mitral Valve: There is moderate regurgitation with a centrally directed   jet.    Tricuspid Valve: There is mild to moderate regurgitation with a   centrally directed jet.    Pulmonary Artery: There is severe pulmonary hypertension. The estimated   pulmonary artery systolic pressure is 79 mmHg.    IVC/SVC: Intermediate venous pressure at 8 mmHg.    Pericardium: There is a small posterior effusion.        Echo    Result Date: 7/8/2024    Left Ventricle: The left ventricle is normal in size. Mildly to   moderately increased wall thickness. Unable to assess wall motion. There   is normal systolic function with a visually estimated ejection fraction of   60 - 65%. Grade II diastolic dysfunction.    Left Atrium: Left atrium is mildly dilated.    Aortic Valve: There is trace aortic regurgitation.    Mitral Valve:  There is mild to moderate regurgitation.    Right Ventricle: Normal right ventricular cavity size. Systolic   function is normal.    Tricuspid Valve: There is mild regurgitation.    The estimated pulmonary artery systolic pressure is 64 mmHg.    IVC/SVC: Elevated venous pressure at 15 mmHg.        Assessment and Plan:       * Acute on chronic congestive heart failure  Patient admitted with acute on chronic heart failure exacerbation complicated by hypotension limiting diuresis. Has received fluid, albumin, and midodrine for BP support. Minimal charting  of UOP and weight trend. TR and MR worse on most recent TTE though possibly due to volume overload rather than progression of disease and may be reversible. If remains severe MR and clinically improves may be candidate for cherie-clip. At this time, patient requires aggressive diuresis given deteriorating clinical status which is likely to require inotrope or vasopressor support given BP. He was transferred to CCU for further management.     Current Heart Failure Medications  furosemide (Lasix) 500 mg in 50 mL infusion (conc: 10 mg/mL), Continuous, Intravenous      Plan  - Monitor strict I&Os and daily weights.    - Place on telemetry  - Low sodium diet  - Place on fluid restriction of 1.5 L.   - Decrease to 10 lasix gtt; tomorrow will transition to PO Lasix 60 mg BID; takes PO Lasix 40mg every other day at home      History of DVT (deep vein thrombosis)  U/S - negative for DVT. Prior known L greater saphenous vein nonocclusive thrombus resolved.     Plan:  - C/W eliquis    Paroxysmal atrial fibrillation  Patient has paroxysmal (<7 days) atrial fibrillation. Patient is currently in sinus rhythm. [id number] Score: 4. The patients heart rate in the last 24 hours is as follows:  Pulse  Min: 64  Max: 84     amiodarone tablet 200 mg, 2 times daily, Oral  Eliquis 5mg bid     Plan  - Replete lytes with a goal of K>4, Mg >2  - Patient is anticoagulated, see medications listed above.  - Last ILR showing 16% burden of AF, if ischemic and valvular workup for drop in EF negative can consider arrhythmia as culprit. Continue to follow up with EP as an outpatient         Hypotension  Patient presented with hypotension on 2/6, which improved after administration of home midodrine, allowing continuation of Lasix and BB. Despite multiple doses of albumin on 2/8, hypotension persisted, prompting a cardiology consult.    CAD (coronary artery disease)  CAD s/p PCI (mLAD 2017)   - Continue home ASA  -Given patient new drop in EF he will  "need to undergo ischemic workup as an outpatient with PET     Pancreatic mass  CT abd w contrast reviewed: "There is a subtle low attenuating lesion at the pancreatic head measuring 1.1 cm, stable noting fatty atrophy of the pancreas without pancreatic ductal dilation."  - [facility] consulted, recommend against ERCP at this time. Plan for follow up in clinic with imaging.   - referral to [facility] at [place]    Atelectasis  I have personally reviewed Chest X-ray. Patient with atelectasis on interpretation. Likely  compressive atelectasis . Signs and symptoms include Shortness of breath Will begin treatment with incentive spirometry and upright positioning.    Pulmonary hypertension  - diuresis as above    Ileostomy in place  Pt reporting decreased ostomy output, reports no flatulence in past 3 days. CT abdomen performed and reviewed: "Surgical change of colectomy, noting some slow flow proximal to the ostomy without obstruction."  - pt tolerating full diet. However, if change in PO intake would consider further evaluation.   - continue close monitoring of bowel function and ostomy   - now having normal ostomy output    History of ulcerative colitis  Stable. s/p colectomy and ileostomy      GERD (gastroesophageal reflux disease)  Continue home protonix     BPH (benign prostatic hyperplasia)  s/p TURP  Continue home tamsulosin    Chronic renal impairment, stage 4 (severe)  Creatine stable for now. BMP reviewed- noted Estimated Creatinine Clearance: 22.7 mL/min (A) (based on SCr of 2.7 mg/dL (H)). according to latest data. Based on current GFR, CKD stage is stage 4 - GFR 15-29.  Monitor UOP and serial BMP and adjust therapy as needed. Renally dose meds. Avoid nephrotoxic medications and procedures.    T2DM (type 2 diabetes mellitus)  Patient's FSGs are uncontrolled due to hyperglycemia on current medication regimen.  Last A1c reviewed-   Lab Results   Component Value Date    HGBA1C 6.5 (H) 12/27/2024     Most recent fingerstick glucose " reviewed-   Recent Labs   Lab 02/09/25  1656 02/09/25  2116 02/10/25  0754 02/10/25  1154   POCTGLUCOSE 120* 170* 137* 248*     Current correctional scale  Low  Maintain anti-hyperglycemic dose as follows-   Antihyperglycemics (From admission, onward)      Start     Stop Route Frequency Ordered    02/04/25 2003  insulin aspart U-100 pen 0-5 Units         -- SubQ Before meals & nightly PRN 02/04/25 1903          Hold Oral hypoglycemics while patient is in the hospital.        VTE Risk Mitigation (From admission, onward)           Ordered     apixaban tablet 5 mg  2 times daily         02/09/25 1107                    Arely Rodriguez MD  Cardiology  [facility]

## 2025-02-10 NOTE — ASSESSMENT & PLAN NOTE
Patient admitted with acute on chronic heart failure exacerbation complicated by hypotension limiting diuresis. Has received fluid, albumin, and midodrine for BP support. Minimal charting of UOP and weight trend. TR and MR worse on most recent TTE though possibly due to volume overload rather than progression of disease and may be reversible. If remains severe MR and clinically improves may be candidate for cherie-clip. At this time, patient requires aggressive diuresis given deteriorating clinical status which is likely to require inotrope or vasopressor support given BP. He was transferred to CCU for further management.     Current Heart Failure Medications  furosemide (Lasix) 500 mg in 50 mL infusion (conc: 10 mg/mL), Continuous, Intravenous      Plan  - Monitor strict I&Os and daily weights.    - Place on telemetry  - Low sodium diet  - Place on fluid restriction of 1.5 L.   - Decrease to 10 lasix gtt; tomorrow will transition to PO Lasix 60 mg BID; takes PO Lasix 40mg every other day at home

## 2025-02-10 NOTE — ASSESSMENT & PLAN NOTE
Creatine stable for now. BMP reviewed- noted Estimated Creatinine Clearance: 22.7 mL/min (A) (based on SCr of 2.7 mg/dL (H)). according to latest data. Based on current GFR, CKD stage is stage 4 - GFR 15-29.  Monitor UOP and serial BMP and adjust therapy as needed. Renally dose meds. Avoid nephrotoxic medications and procedures.

## 2025-02-10 NOTE — HOSPITAL COURSE
Initially presented with decreased ostomy output and LE edema, found to have LE DVT. Also noted to have GRADY on top of CKD, and with hypotension he was given aggressive albumin and IV supplementation. Patient admitted with acute on chronic HF exacerbation complicated by hypotension limiting diuresis  Transferred to CCU from  when he was noted to have severe pulmonary edema, in the setting of underlying hypotension.    Patient has received agressive dieuresis while in CCU but has responeded well and now transitioned to PO Torsemide. Patient is also  to baseline Midodrine. If remains severe MR and clinically improves may be candidate for cherie-clip outpatient. Will continue to titrate down to home dose. Started prednisone 5 day course for gout. Vision back to baseline. Responding well to diuresis and will transition to Torsemide 20 BID.

## 2025-02-10 NOTE — PLAN OF CARE
[facility]  Discharge Reassessment    Primary Care Provider: Tripp Mohan MD    Expected Discharge Date: 2/11/2025    Reassessment (most recent)       Discharge Reassessment - 02/10/25 1202          Discharge Reassessment    Assessment Type Discharge Planning Reassessment     Discharge Plan discussed with: Patient     Communicated JOSÉ MIGUEL with patient/caregiver Yes     Discharge Plan A Home     Discharge Plan B Home Health     DME Needed Upon Discharge  none     Transition of Care Barriers None     Why the patient remains in the hospital Requires continued medical care                   Per CCU team pt might step down from the ICU today.  SW met with pt at bedside who denied any questions or concerns at this time.  Discharge Plan A and Plan B have been determined by review of patient's clinical status, future medical and therapeutic needs, and coverage/benefits for post-acute care in coordination with multidisciplinary team members.  ALONSO name and ext on whiteboard.  Will continue to follow.      Ivette Garcia LMSW  [facility]  [id number]

## 2025-02-10 NOTE — ASSESSMENT & PLAN NOTE
Patient's FSGs are uncontrolled due to hyperglycemia on current medication regimen.  Last A1c reviewed-   Lab Results   Component Value Date    HGBA1C 6.5 (H) 12/27/2024     Most recent fingerstick glucose reviewed-   Recent Labs   Lab 02/09/25  1656 02/09/25  2116 02/10/25  0754 02/10/25  1154   POCTGLUCOSE 120* 170* 137* 248*     Current correctional scale  Low  Maintain anti-hyperglycemic dose as follows-   Antihyperglycemics (From admission, onward)      Start     Stop Route Frequency Ordered    02/04/25 2003  insulin aspart U-100 pen 0-5 Units         -- SubQ Before meals & nightly PRN 02/04/25 1903          Hold Oral hypoglycemics while patient is in the hospital.

## 2025-02-10 NOTE — PLAN OF CARE
CICU DAILY GOALS       A: Awake    RASS: Goal -    Actual - RASS (Norris Agitation-Sedation Scale): alert and calm   Restraint necessity:    B: Breath   SBT: NA   C: Coordinate A & B, analgesics/sedatives   Pain: managed    SAT: NA  D: Delirium   CAM-ICU:    E: Early(intubated/ Progressive (non-intubated) Mobility   MOVE Screen: Pass   Activity: Activity Management: Rolling - L1  FAS: Feeding/Nutrition   Diet order: Diet/Nutrition Received: regular,   Fluid restriction:     Nutritional Supplement Intake: Quantity 0, Type:  0  T: Thrombus   DVT prophylaxis: VTE Core Measure: Pharmacological prophylaxis initiated/maintained  H: HOB Elevation   Head of Bed (HOB) Positioning: HOB elevated  U: Ulcer Prophylaxis   GI: yes  G: Glucose control   managed Glycemic Management: blood glucose monitored  S: Skin   Bathing/Skin Care: bath, complete (02/09/25 0701)  Wounds: No  Wound care consulted: No  B: Bowel Function   no issues   I: Indwelling Catheters   Little necessity:     CVC necessity: No  D: De-escalation Antibx   Yes  Plan for the day   Possible transfer or dc   Family/Goals of care/Code Status   Code Status: Full Code     SHAD HODGE and assessment per flow sheet, patient progressing towards goals as tolerated, plan of care reviewed with Jarrett Charlton Jr. and family, all concerns addressed, will continue to monitor.

## 2025-02-10 NOTE — SUBJECTIVE & OBJECTIVE
Interval History: Patient is feeling better today. K+ 3.4, Mg 1.6 both repleted this morning.  BMP for 3pm to monitor. Since MAP in the 90s, Midodrine has been cut to 5 since he was taking 2.5 at home. Not on presssors and lactate WNL so possible stepdown to  if can tolerate Midodrine. Discuss Prednisone regarding gout.     ROS  Objective:     Vital Signs (Most Recent):  Temp: 98.3 °F (36.8 °C) (02/10/25 1101)  Pulse: 75 (02/10/25 1200)  Resp: (!) 21 (02/10/25 1200)  BP: 119/65 (02/10/25 1200)  SpO2: 99 % (02/10/25 1200) Vital Signs (24h Range):  Temp:  [97.9 °F (36.6 °C)-98.3 °F (36.8 °C)] 98.3 °F (36.8 °C)  Pulse:  [64-84] 75  Resp:  [14-40] 21  SpO2:  [94 %-100 %] 99 %  BP: ()/(56-76) 119/65     Weight: 68 kg (150 lb)  Body mass index is 19.26 kg/m².     SpO2: 99 %         Intake/Output Summary (Last 24 hours) at 2/10/2025 1246  Last data filed at 2/10/2025 1101  Gross per 24 hour   Intake 1046.63 ml   Output 3150 ml   Net -2103.37 ml       Lines/Drains/Airways       Drain  Duration                  Ileostomy 01/01/84 RUQ 29035 days              Peripheral Intravenous Line  Duration                  Peripheral IV - Single Lumen 02/03/25 1034 20 G Posterior;Right Forearm 7 days         Peripheral IV - Single Lumen 02/09/25 1930 18 G Left;Anterior Forearm <1 day                       Physical Exam  Constitutional:       General: He is not in acute distress.  Cardiovascular:      Rate and Rhythm: Normal rate.      Pulses: Normal pulses.      Heart sounds: Normal heart sounds.   Pulmonary:      Effort: Pulmonary effort is normal. No respiratory distress.      Breath sounds: No wheezing.   Abdominal:      General: Abdomen is flat. There is no distension.      Palpations: Abdomen is soft.      Tenderness: There is no abdominal tenderness.   Musculoskeletal:      Right lower leg: No edema.      Left lower leg: No edema.   Neurological:      Mental Status: He is alert.            Significant Labs: ABG: No results  "for input(s): "PH", "PCO2", "HCO3", "POCSATURATED", "BE" in the last 48 hours., Blood Culture: No results for input(s): "LABBLOO" in the last 48 hours., BMP:   Recent Labs   Lab 02/09/25  0259 02/09/25  1657 02/10/25  0310 02/10/25  1152   GLU 87 112* 139*  --     139 140  --    K 4.0 3.2* 3.4* 4.1    102 102  --    CO2 21* 23 25  --    BUN 60* 61* 55*  --    CREATININE 2.8* 3.2* 2.7*  --    CALCIUM 8.6* 8.8 8.8  --    MG  --   --  1.6 2.9*   , CMP   Recent Labs   Lab 02/09/25  0259 02/09/25  1657 02/10/25  0310 02/10/25  1152    139 140  --    K 4.0 3.2* 3.4* 4.1    102 102  --    CO2 21* 23 25  --    GLU 87 112* 139*  --    BUN 60* 61* 55*  --    CREATININE 2.8* 3.2* 2.7*  --    CALCIUM 8.6* 8.8 8.8  --    PROT 6.0  --  6.2  --    ALBUMIN 3.6 3.7 3.7  --    BILITOT 1.4*  --  1.2*  --    ALKPHOS 177*  --  159*  --    AST 36  --  19  --    ALT 37  --  32  --    ANIONGAP 15 14 13  --    , CBC   Recent Labs   Lab 02/08/25  1541 02/09/25  0259 02/10/25  0310   WBC 3.71* 5.29 5.25   HGB 10.0* 9.6* 10.2*   HCT 33.6* 31.7* 33.0*   PLT 88* 123* 132*   , Lipid Panel No results for input(s): "CHOL", "HDL", "LDLCALC", "TRIG", "CHOLHDL" in the last 48 hours., and Troponin No results for input(s): "TROPONINIHS" in the last 48 hours.    Significant Imaging:   CXR 2/9/25:  When compared to prior radiograph of 02/04/2025, 14:34 hours, relative similar bibasilar opacities and small bilateral pleural effusions.     Echo    Result Date: 1/24/2025    Left Ventricle: The left ventricle is mildly dilated. Ventricular mass   is normal. Normal wall thickness. Severe global hypokinesis present. There   is severely reduced systolic function. Ejection fraction is approximately   30%. Grade I diastolic dysfunction.    Right Ventricle: Normal right ventricular cavity size. Wall thickness   is normal. Right ventricle wall motion has global hypokinesis. Systolic   function is moderately reduced.    Left Atrium: Left " atrium is severely dilated.    Right Atrium: Right atrium is moderately dilated.    Aortic Valve: The aortic valve is a trileaflet valve. There is mild   aortic valve sclerosis. There is mild aortic regurgitation with a   centrally directed jet.    Mitral Valve: There is severe regurgitation with a centrally directed   jet.    Tricuspid Valve: There is moderate to severe regurgitation with a   centrally directed jet.    Pulmonary Artery: There is mild to moderate pulmonary hypertension. The   estimated pulmonary artery systolic pressure is 42 mmHg.    IVC/SVC: Elevated venous pressure at 15 mmHg.    Pericardium: There is a small circumferential effusion. Left pleural   effusion.        Echo Saline Bubble? No    Result Date: 8/5/2024    Left Ventricle: The left ventricle is normal in size. Moderately   increased wall thickness. There is concentric hypertrophy. Moderate global   hypokinesis present. There is moderately reduced systolic function.   Ejection fraction by visual approximation is 35%. Grade II diastolic   dysfunction. Elevated left ventricular filling pressure. Tissue Doppler   velocity is reduced.    Right Ventricle: Normal right ventricular cavity size. Wall thickness   is normal. Systolic function is normal.    Left Atrium: Left atrium is severely dilated.    Right Atrium: Right atrium is moderately dilated.    Aortic Valve: There is mild aortic valve sclerosis. Mildly restricted   motion.    Mitral Valve: There is moderate regurgitation with a centrally directed   jet.    Tricuspid Valve: There is mild to moderate regurgitation with a   centrally directed jet.    Pulmonary Artery: There is severe pulmonary hypertension. The estimated   pulmonary artery systolic pressure is 79 mmHg.    IVC/SVC: Intermediate venous pressure at 8 mmHg.    Pericardium: There is a small posterior effusion.        Echo    Result Date: 7/8/2024    Left Ventricle: The left ventricle is normal in size. Mildly to   moderately  increased wall thickness. Unable to assess wall motion. There   is normal systolic function with a visually estimated ejection fraction of   60 - 65%. Grade II diastolic dysfunction.    Left Atrium: Left atrium is mildly dilated.    Aortic Valve: There is trace aortic regurgitation.    Mitral Valve:  There is mild to moderate regurgitation.    Right Ventricle: Normal right ventricular cavity size. Systolic   function is normal.    Tricuspid Valve: There is mild regurgitation.    The estimated pulmonary artery systolic pressure is 64 mmHg.    IVC/SVC: Elevated venous pressure at 15 mmHg.

## 2025-02-11 LAB
ALBUMIN SERPL BCP-MCNC: 3.7 G/DL (ref 3.5–5.2)
ALP SERPL-CCNC: 169 U/L (ref 40–150)
ALT SERPL W/O P-5'-P-CCNC: 27 U/L (ref 10–44)
ANION GAP SERPL CALC-SCNC: 14 MMOL/L (ref 8–16)
AST SERPL-CCNC: 14 U/L (ref 10–40)
BASOPHILS # BLD AUTO: 0 K/UL (ref 0–0.2)
BASOPHILS NFR BLD: 0 % (ref 0–1.9)
BILIRUB SERPL-MCNC: 1.1 MG/DL (ref 0.1–1)
BUN SERPL-MCNC: 53 MG/DL (ref 8–23)
CALCIUM SERPL-MCNC: 9.2 MG/DL (ref 8.7–10.5)
CHLORIDE SERPL-SCNC: 102 MMOL/L (ref 95–110)
CO2 SERPL-SCNC: 24 MMOL/L (ref 23–29)
CREAT SERPL-MCNC: 2.6 MG/DL (ref 0.5–1.4)
DIFFERENTIAL METHOD BLD: ABNORMAL
EOSINOPHIL # BLD AUTO: 0 K/UL (ref 0–0.5)
EOSINOPHIL NFR BLD: 0 % (ref 0–8)
ERYTHROCYTE [DISTWIDTH] IN BLOOD BY AUTOMATED COUNT: 19 % (ref 11.5–14.5)
EST. GFR  (NO RACE VARIABLE): 24.9 ML/MIN/1.73 M^2
GLUCOSE SERPL-MCNC: 188 MG/DL (ref 70–110)
HCT VFR BLD AUTO: 34.8 % (ref 40–54)
HGB BLD-MCNC: 10.7 G/DL (ref 14–18)
IMM GRANULOCYTES # BLD AUTO: 0.01 K/UL (ref 0–0.04)
IMM GRANULOCYTES NFR BLD AUTO: 0.3 % (ref 0–0.5)
LYMPHOCYTES # BLD AUTO: 0.4 K/UL (ref 1–4.8)
LYMPHOCYTES NFR BLD: 10.4 % (ref 18–48)
MAGNESIUM SERPL-MCNC: 2.3 MG/DL (ref 1.6–2.6)
MCH RBC QN AUTO: 27.6 PG (ref 27–31)
MCHC RBC AUTO-ENTMCNC: 30.7 G/DL (ref 32–36)
MCV RBC AUTO: 90 FL (ref 82–98)
MONOCYTES # BLD AUTO: 0.1 K/UL (ref 0.3–1)
MONOCYTES NFR BLD: 2.1 % (ref 4–15)
NEUTROPHILS # BLD AUTO: 3.4 K/UL (ref 1.8–7.7)
NEUTROPHILS NFR BLD: 87.2 % (ref 38–73)
NRBC BLD-RTO: 0 /100 WBC
PHOSPHATE SERPL-MCNC: 3 MG/DL (ref 2.7–4.5)
PLATELET # BLD AUTO: 143 K/UL (ref 150–450)
PMV BLD AUTO: 10.5 FL (ref 9.2–12.9)
POCT GLUCOSE: 172 MG/DL (ref 70–110)
POCT GLUCOSE: 185 MG/DL (ref 70–110)
POCT GLUCOSE: 192 MG/DL (ref 70–110)
POCT GLUCOSE: 317 MG/DL (ref 70–110)
POTASSIUM SERPL-SCNC: 4.4 MMOL/L (ref 3.5–5.1)
PROT SERPL-MCNC: 6.8 G/DL (ref 6–8.4)
RBC # BLD AUTO: 3.87 M/UL (ref 4.6–6.2)
SODIUM SERPL-SCNC: 140 MMOL/L (ref 136–145)
WBC # BLD AUTO: 3.84 K/UL (ref 3.9–12.7)

## 2025-02-11 PROCEDURE — 63600175 PHARM REV CODE 636 W HCPCS: Mod: HCNC | Performed by: STUDENT IN AN ORGANIZED HEALTH CARE EDUCATION/TRAINING PROGRAM

## 2025-02-11 PROCEDURE — 25000003 PHARM REV CODE 250: Mod: HCNC | Performed by: INTERNAL MEDICINE

## 2025-02-11 PROCEDURE — 85025 COMPLETE CBC W/AUTO DIFF WBC: CPT | Mod: HCNC | Performed by: INTERNAL MEDICINE

## 2025-02-11 PROCEDURE — 25000003 PHARM REV CODE 250: Mod: HCNC | Performed by: STUDENT IN AN ORGANIZED HEALTH CARE EDUCATION/TRAINING PROGRAM

## 2025-02-11 PROCEDURE — 84100 ASSAY OF PHOSPHORUS: CPT | Mod: HCNC | Performed by: STUDENT IN AN ORGANIZED HEALTH CARE EDUCATION/TRAINING PROGRAM

## 2025-02-11 PROCEDURE — 25000003 PHARM REV CODE 250: Mod: HCNC | Performed by: HOSPITALIST

## 2025-02-11 PROCEDURE — 25000003 PHARM REV CODE 250: Mod: HCNC | Performed by: PHYSICIAN ASSISTANT

## 2025-02-11 PROCEDURE — 25000003 PHARM REV CODE 250: Mod: HCNC

## 2025-02-11 PROCEDURE — 83735 ASSAY OF MAGNESIUM: CPT | Mod: HCNC | Performed by: STUDENT IN AN ORGANIZED HEALTH CARE EDUCATION/TRAINING PROGRAM

## 2025-02-11 PROCEDURE — 94761 N-INVAS EAR/PLS OXIMETRY MLT: CPT | Mod: HCNC

## 2025-02-11 PROCEDURE — 20600001 HC STEP DOWN PRIVATE ROOM: Mod: HCNC

## 2025-02-11 PROCEDURE — 80053 COMPREHEN METABOLIC PANEL: CPT | Mod: HCNC | Performed by: PHYSICIAN ASSISTANT

## 2025-02-11 RX ORDER — TORSEMIDE 20 MG/1
20 TABLET ORAL 2 TIMES DAILY
Status: DISCONTINUED | OUTPATIENT
Start: 2025-02-11 | End: 2025-02-12 | Stop reason: HOSPADM

## 2025-02-11 RX ORDER — AMIODARONE HYDROCHLORIDE 200 MG/1
200 TABLET ORAL DAILY
Status: DISCONTINUED | OUTPATIENT
Start: 2025-02-12 | End: 2025-02-12 | Stop reason: HOSPADM

## 2025-02-11 RX ADMIN — APIXABAN 5 MG: 5 TABLET, FILM COATED ORAL at 08:02

## 2025-02-11 RX ADMIN — INSULIN ASPART 4 UNITS: 100 INJECTION, SOLUTION INTRAVENOUS; SUBCUTANEOUS at 01:02

## 2025-02-11 RX ADMIN — AMIODARONE HYDROCHLORIDE 200 MG: 200 TABLET ORAL at 08:02

## 2025-02-11 RX ADMIN — SODIUM BICARBONATE 650 MG TABLET 1300 MG: at 08:02

## 2025-02-11 RX ADMIN — ASPIRIN 81 MG: 81 TABLET, COATED ORAL at 08:02

## 2025-02-11 RX ADMIN — MIDODRINE HYDROCHLORIDE 5 MG: 5 TABLET ORAL at 05:02

## 2025-02-11 RX ADMIN — ALLOPURINOL 150 MG: 100 TABLET ORAL at 08:02

## 2025-02-11 RX ADMIN — MIDODRINE HYDROCHLORIDE 5 MG: 5 TABLET ORAL at 11:02

## 2025-02-11 RX ADMIN — BRIMONIDINE TARTRATE 1 DROP: 2 SOLUTION OPHTHALMIC at 08:02

## 2025-02-11 RX ADMIN — TORSEMIDE 20 MG: 20 TABLET ORAL at 11:02

## 2025-02-11 RX ADMIN — MUPIROCIN: 20 OINTMENT TOPICAL at 08:02

## 2025-02-11 RX ADMIN — PREDNISONE 40 MG: 20 TABLET ORAL at 08:02

## 2025-02-11 RX ADMIN — TAMSULOSIN HYDROCHLORIDE 0.4 MG: 0.4 CAPSULE ORAL at 08:02

## 2025-02-11 RX ADMIN — TORSEMIDE 20 MG: 20 TABLET ORAL at 05:02

## 2025-02-11 RX ADMIN — MIDODRINE HYDROCHLORIDE 5 MG: 5 TABLET ORAL at 08:02

## 2025-02-11 RX ADMIN — HYDROXYZINE HYDROCHLORIDE 25 MG: 25 TABLET, FILM COATED ORAL at 12:02

## 2025-02-11 RX ADMIN — ACETAMINOPHEN 650 MG: 325 TABLET ORAL at 08:02

## 2025-02-11 NOTE — SUBJECTIVE & OBJECTIVE
Interval History: Patient states vision is back to normal this morning. Denies any pain, dyspnea or any other complaints. Still on Midodrine 5 since he was taking 2.5 at home. Plan to transition to PO Torsemide today and stepdown.     ROS  Objective:     Vital Signs (Most Recent):  Temp: 98.3 °F (36.8 °C) (02/11/25 0400)  Pulse: 75 (02/11/25 0600)  Resp: 20 (02/11/25 0600)  BP: 125/71 (02/11/25 1124)  SpO2: 99 % (02/11/25 0915) Vital Signs (24h Range):  Temp:  [97.9 °F (36.6 °C)-98.4 °F (36.9 °C)] 98.3 °F (36.8 °C)  Pulse:  [58-78] 75  Resp:  [12-44] 20  SpO2:  [97 %-100 %] 99 %  BP: ()/(53-90) 125/71     Weight: 68 kg (150 lb)  Body mass index is 19.26 kg/m².     SpO2: 99 %         Intake/Output Summary (Last 24 hours) at 2/11/2025 1140  Last data filed at 2/11/2025 1105  Gross per 24 hour   Intake 927.28 ml   Output 2550 ml   Net -1622.72 ml       Lines/Drains/Airways       Drain  Duration                  Ileostomy 01/01/84 RUQ 62966 days              Peripheral Intravenous Line  Duration                  Peripheral IV - Single Lumen 02/03/25 1034 20 G Posterior;Right Forearm 8 days         Peripheral IV - Single Lumen 02/09/25 1930 18 G Left;Anterior Forearm 1 day                       Physical Exam  Constitutional:       General: He is not in acute distress.  Cardiovascular:      Rate and Rhythm: Normal rate.      Pulses: Normal pulses.      Heart sounds: Normal heart sounds.   Pulmonary:      Effort: Pulmonary effort is normal. No respiratory distress.      Breath sounds: No wheezing.   Abdominal:      General: Abdomen is flat. There is no distension.      Palpations: Abdomen is soft.      Tenderness: There is no abdominal tenderness.      Comments: -ostomy   Musculoskeletal:      Right lower leg: No edema.      Left lower leg: No edema.   Neurological:      Mental Status: He is alert.            Significant Labs: BMP:   Recent Labs   Lab 02/10/25  0310 02/10/25  1152 02/10/25  1733 02/11/25  0427   GLU  "139*  --  149* 188*     --  142 140   K 3.4* 4.1 3.4* 4.4     --  104 102   CO2 25  --  27 24   BUN 55*  --  52* 53*   CREATININE 2.7*  --  2.5* 2.6*   CALCIUM 8.8  --  8.6* 9.2   MG 1.6 2.9*  --  2.3   , CMP   Recent Labs   Lab 02/09/25  1657 02/10/25  0310 02/10/25  1152 02/10/25  1733 02/11/25  0427    140  --  142 140   K 3.2* 3.4* 4.1 3.4* 4.4    102  --  104 102   CO2 23 25  --  27 24   * 139*  --  149* 188*   BUN 61* 55*  --  52* 53*   CREATININE 3.2* 2.7*  --  2.5* 2.6*   CALCIUM 8.8 8.8  --  8.6* 9.2   PROT  --  6.2  --   --  6.8   ALBUMIN 3.7 3.7  --   --  3.7   BILITOT  --  1.2*  --   --  1.1*   ALKPHOS  --  159*  --   --  169*   AST  --  19  --   --  14   ALT  --  32  --   --  27   ANIONGAP 14 13  --  11 14   , CBC   Recent Labs   Lab 02/10/25  0310 02/11/25  0427   WBC 5.25 3.84*   HGB 10.2* 10.7*   HCT 33.0* 34.8*   * 143*   , Lipid Panel No results for input(s): "CHOL", "HDL", "LDLCALC", "TRIG", "CHOLHDL" in the last 48 hours., and Troponin No results for input(s): "TROPONINIHS" in the last 48 hours.    Significant Imaging:   CXR 2/9/25:  When compared to prior radiograph of 02/04/2025, 14:34 hours, relative similar bibasilar opacities and small bilateral pleural effusions.     Echo    Result Date: 1/24/2025    Left Ventricle: The left ventricle is mildly dilated. Ventricular mass   is normal. Normal wall thickness. Severe global hypokinesis present. There   is severely reduced systolic function. Ejection fraction is approximately   30%. Grade I diastolic dysfunction.    Right Ventricle: Normal right ventricular cavity size. Wall thickness   is normal. Right ventricle wall motion has global hypokinesis. Systolic   function is moderately reduced.    Left Atrium: Left atrium is severely dilated.    Right Atrium: Right atrium is moderately dilated.    Aortic Valve: The aortic valve is a trileaflet valve. There is mild   aortic valve sclerosis. There is mild aortic " regurgitation with a   centrally directed jet.    Mitral Valve: There is severe regurgitation with a centrally directed   jet.    Tricuspid Valve: There is moderate to severe regurgitation with a   centrally directed jet.    Pulmonary Artery: There is mild to moderate pulmonary hypertension. The   estimated pulmonary artery systolic pressure is 42 mmHg.    IVC/SVC: Elevated venous pressure at 15 mmHg.    Pericardium: There is a small circumferential effusion. Left pleural   effusion.

## 2025-02-11 NOTE — ASSESSMENT & PLAN NOTE
Patient's FSGs are uncontrolled due to hyperglycemia on current medication regimen.  Last A1c reviewed-   Lab Results   Component Value Date    HGBA1C 6.5 (H) 12/27/2024     Most recent fingerstick glucose reviewed-   Recent Labs   Lab 02/10/25  1154 02/10/25  1732 02/10/25  2023 02/11/25  0858   POCTGLUCOSE 248* 168* 268* 192*     Current correctional scale  Low  Maintain anti-hyperglycemic dose as follows-   Antihyperglycemics (From admission, onward)      Start     Stop Route Frequency Ordered    02/04/25 2003  insulin aspart U-100 pen 0-5 Units         -- SubQ Before meals & nightly PRN 02/04/25 1903          Hold Oral hypoglycemics while patient is in the hospital.

## 2025-02-11 NOTE — PLAN OF CARE
"CICU DAILY GOALS       A: Awake    RASS: Goal -    Actual - RASS (Norris Agitation-Sedation Scale): alert and calm   Restraint necessity:    B: Breath   SBT: Not intubated   C: Coordinate A & B, analgesics/sedatives   Pain: managed    SAT: Not intubated  D: Delirium   CAM-ICU:    E: Early(intubated/ Progressive (non-intubated) Mobility   MOVE Screen: Pass   Activity: Activity Management: Rolling - L1  FAS: Feeding/Nutrition   Diet order: Diet/Nutrition Received: regular,   Fluid restriction:       T: Thrombus   DVT prophylaxis: VTE Core Measure: Pharmacological prophylaxis initiated/maintained  H: HOB Elevation   Head of Bed (HOB) Positioning: HOB elevated  U: Ulcer Prophylaxis   GI: yes  G: Glucose control   managed Glycemic Management: blood glucose monitored  S: Skin   Bathing/Skin Care: bath, complete (02/09/25 0701)  Wounds: No  Wound care consulted: No  B: Bowel Function   no issues   I: Indwelling Catheters   Little necessity:     CVC necessity: No  D: De-escalation Antibx   NA  Plan for the day    V/S monitoring     Family/Goals of care/Code Status   Code Status: Full Code     Lasix decreased to 10mg/hr. Around noon, Pt c/o of blurred vision, Tato LORA MD at bedside to assess pt. Pt c/o of blurred vision again at 1800, stated "It never really went away and now I have a headache." Neuro check completed, pt able to perform all tasks, Hermann THAPA notified. VS and assessment per flow sheet, patient progressing towards goals as tolerated, plan of care reviewed with Jarrett Charlton Jr., all concerns addressed.    "

## 2025-02-11 NOTE — PROGRESS NOTES
[facility]  Cardiology  Progress Note    Patient Name: Jarrett Charlton Jr.  MRN: [id number]  Admission Date: 2/3/2025  Hospital Length of Stay: 7 days  Code Status: Full Code   Attending Physician: Russell Gamboa MD   Primary Care Physician: Tripp Mohan MD  Expected Discharge Date: 2/12/2025  Principal Problem:Acute on chronic congestive heart failure    Subjective:     Hospital Course:   Initially presented with decreased ostomy output and LE edema, found to have LE DVT. Also noted to have GRADY on top of CKD, and with hypotension he was given aggressive albumin and IV supplementation. Patient admitted with acute on chronic HF exacerbation complicated by hypotension limiting diuresis  Transferred to CCU from  when he was noted to have severe pulmonary edema, in the setting of underlying hypotension.    Patient has received agressive dieuresis while in CCU but has responeded well and now transitioned to PO Torsemide. Patient is also  to baseline Midodrine. If remains severe MR and clinically improves may be candidate for cherie-clip outpatient. Will continue to titrate down to home dose. Started prednisone 5 day course for gout. Vision back to baseline. Responding well to diuresis and will transition to Torsemide 20 BID.     Interval History: Patient states vision is back to normal this morning. Denies any pain, dyspnea or any other complaints. Still on Midodrine 5 since he was taking 2.5 at home. Plan to transition to PO Torsemide today and stepdown.     ROS  Objective:     Vital Signs (Most Recent):  Temp: 98.3 °F (36.8 °C) (02/11/25 0400)  Pulse: 75 (02/11/25 0600)  Resp: 20 (02/11/25 0600)  BP: 125/71 (02/11/25 1124)  SpO2: 99 % (02/11/25 0915) Vital Signs (24h Range):  Temp:  [97.9 °F (36.6 °C)-98.4 °F (36.9 °C)] 98.3 °F (36.8 °C)  Pulse:  [58-78] 75  Resp:  [12-44] 20  SpO2:  [97 %-100 %] 99 %  BP: ()/(53-90) 125/71     Weight: 68 kg (150 lb)  Body mass index is  19.26 kg/m².     SpO2: 99 %         Intake/Output Summary (Last 24 hours) at 2/11/2025 1140  Last data filed at 2/11/2025 1105  Gross per 24 hour   Intake 927.28 ml   Output 2550 ml   Net -1622.72 ml       Lines/Drains/Airways       Drain  Duration                  Ileostomy 01/01/84 RUQ 06045 days              Peripheral Intravenous Line  Duration                  Peripheral IV - Single Lumen 02/03/25 1034 20 G Posterior;Right Forearm 8 days         Peripheral IV - Single Lumen 02/09/25 1930 18 G Left;Anterior Forearm 1 day                       Physical Exam  Constitutional:       General: He is not in acute distress.  Cardiovascular:      Rate and Rhythm: Normal rate.      Pulses: Normal pulses.      Heart sounds: Normal heart sounds.   Pulmonary:      Effort: Pulmonary effort is normal. No respiratory distress.      Breath sounds: No wheezing.   Abdominal:      General: Abdomen is flat. There is no distension.      Palpations: Abdomen is soft.      Tenderness: There is no abdominal tenderness.      Comments: -ostomy   Musculoskeletal:      Right lower leg: No edema.      Left lower leg: No edema.   Neurological:      Mental Status: He is alert.            Significant Labs: BMP:   Recent Labs   Lab 02/10/25  0310 02/10/25  1152 02/10/25  1733 02/11/25  0427   *  --  149* 188*     --  142 140   K 3.4* 4.1 3.4* 4.4     --  104 102   CO2 25  --  27 24   BUN 55*  --  52* 53*   CREATININE 2.7*  --  2.5* 2.6*   CALCIUM 8.8  --  8.6* 9.2   MG 1.6 2.9*  --  2.3   , CMP   Recent Labs   Lab 02/09/25  1657 02/10/25  0310 02/10/25  1152 02/10/25  1733 02/11/25  0427    140  --  142 140   K 3.2* 3.4* 4.1 3.4* 4.4    102  --  104 102   CO2 23 25  --  27 24   * 139*  --  149* 188*   BUN 61* 55*  --  52* 53*   CREATININE 3.2* 2.7*  --  2.5* 2.6*   CALCIUM 8.8 8.8  --  8.6* 9.2   PROT  --  6.2  --   --  6.8   ALBUMIN 3.7 3.7  --   --  3.7   BILITOT  --  1.2*  --   --  1.1*   ALKPHOS  --  159*  " --   --  169*   AST  --  19  --   --  14   ALT  --  32  --   --  27   ANIONGAP 14 13  --  11 14   , CBC   Recent Labs   Lab 02/10/25  0310 02/11/25  0427   WBC 5.25 3.84*   HGB 10.2* 10.7*   HCT 33.0* 34.8*   * 143*   , Lipid Panel No results for input(s): "CHOL", "HDL", "LDLCALC", "TRIG", "CHOLHDL" in the last 48 hours., and Troponin No results for input(s): "TROPONINIHS" in the last 48 hours.    Significant Imaging:   CXR 2/9/25:  When compared to prior radiograph of 02/04/2025, 14:34 hours, relative similar bibasilar opacities and small bilateral pleural effusions.     Echo    Result Date: 1/24/2025    Left Ventricle: The left ventricle is mildly dilated. Ventricular mass   is normal. Normal wall thickness. Severe global hypokinesis present. There   is severely reduced systolic function. Ejection fraction is approximately   30%. Grade I diastolic dysfunction.    Right Ventricle: Normal right ventricular cavity size. Wall thickness   is normal. Right ventricle wall motion has global hypokinesis. Systolic   function is moderately reduced.    Left Atrium: Left atrium is severely dilated.    Right Atrium: Right atrium is moderately dilated.    Aortic Valve: The aortic valve is a trileaflet valve. There is mild   aortic valve sclerosis. There is mild aortic regurgitation with a   centrally directed jet.    Mitral Valve: There is severe regurgitation with a centrally directed   jet.    Tricuspid Valve: There is moderate to severe regurgitation with a   centrally directed jet.    Pulmonary Artery: There is mild to moderate pulmonary hypertension. The   estimated pulmonary artery systolic pressure is 42 mmHg.    IVC/SVC: Elevated venous pressure at 15 mmHg.    Pericardium: There is a small circumferential effusion. Left pleural   effusion.        Assessment and Plan:       * Acute on chronic congestive heart failure  Patient admitted with acute on chronic heart failure exacerbation complicated by hypotension " limiting diuresis. Has received fluid, albumin, and midodrine for BP support. Minimal charting of UOP and weight trend. TR and MR worse on most recent TTE though possibly due to volume overload rather than progression of disease and may be reversible. If remains severe MR and clinically improves may be candidate for cherie-clip. At this time, patient requires aggressive diuresis given deteriorating clinical status which is likely to require inotrope or vasopressor support given BP. He was transferred to CCU for further management.     Current Heart Failure Medications  torsemide tablet 20 mg, 2 times daily, Oral      Plan  - Monitor strict I&Os and daily weights.  -Torsemide 20 mg BID PO    - Place on telemetry  - Low sodium diet  - Place on fluid restriction of 1.5 L.         History of DVT (deep vein thrombosis)  U/S - negative for DVT. Prior known L greater saphenous vein nonocclusive thrombus resolved.     Plan:  - C/W eliquis    Paroxysmal atrial fibrillation  Patient has paroxysmal (<7 days) atrial fibrillation. Patient is currently in sinus rhythm. [id number] Score: 4. The patients heart rate in the last 24 hours is as follows:  Pulse  Min: 58  Max: 78     amiodarone tablet 200 mg, Daily, Oral  Eliquis 5mg bid     Plan  - Replete lytes with a goal of K>4, Mg >2  - Patient is anticoagulated, see medications listed above.  - Last ILR showing 16% burden of AF, if ischemic and valvular workup for drop in EF negative can consider arrhythmia as culprit. Continue to follow up with EP as an outpatient         Hypotension  Patient presented with hypotension on 2/6, which improved after administration of home midodrine, allowing continuation of Lasix and BB. Despite multiple doses of albumin on 2/8, hypotension persisted, prompting a cardiology consult.    CAD (coronary artery disease)  CAD s/p PCI (mLAD 2017)   - Continue home ASA  -Given patient new drop in EF he will need to undergo ischemic workup as an outpatient  "with PET     Pancreatic mass  CT abd w contrast reviewed: "There is a subtle low attenuating lesion at the pancreatic head measuring 1.1 cm, stable noting fatty atrophy of the pancreas without pancreatic ductal dilation."  - [facility] consulted, recommend against ERCP at this time. Plan for follow up in clinic with imaging.   - referral to [facility] at [place]    Atelectasis  I have personally reviewed Chest X-ray. Patient with atelectasis on interpretation. Likely  compressive atelectasis . Signs and symptoms include Shortness of breath Will begin treatment with incentive spirometry and upright positioning.    Pulmonary hypertension  - diuresis as above    Ileostomy in place  Pt reporting decreased ostomy output, reports no flatulence in past 3 days. CT abdomen performed and reviewed: "Surgical change of colectomy, noting some slow flow proximal to the ostomy without obstruction."  - pt tolerating full diet. However, if change in PO intake would consider further evaluation.   - continue close monitoring of bowel function and ostomy   - now having normal ostomy output    History of ulcerative colitis  Stable. s/p colectomy and ileostomy      GERD (gastroesophageal reflux disease)  Continue home protonix     BPH (benign prostatic hyperplasia)  s/p TURP  Continue home tamsulosin    Chronic renal impairment, stage 4 (severe)  Creatine stable for now. BMP reviewed- noted Estimated Creatinine Clearance: 23.6 mL/min (A) (based on SCr of 2.6 mg/dL (H)). according to latest data. Based on current GFR, CKD stage is stage 4 - GFR 15-29.  Monitor UOP and serial BMP and adjust therapy as needed. Renally dose meds. Avoid nephrotoxic medications and procedures.    T2DM (type 2 diabetes mellitus)  Patient's FSGs are uncontrolled due to hyperglycemia on current medication regimen.  Last A1c reviewed-   Lab Results   Component Value Date    HGBA1C 6.5 (H) 12/27/2024     Most recent fingerstick glucose reviewed-   Recent Labs   Lab 02/10/25  1154 " 02/10/25  1732 02/10/25  2023 02/11/25  0858   POCTGLUCOSE 248* 168* 268* 192*     Current correctional scale  Low  Maintain anti-hyperglycemic dose as follows-   Antihyperglycemics (From admission, onward)      Start     Stop Route Frequency Ordered    02/04/25 2003  insulin aspart U-100 pen 0-5 Units         -- SubQ Before meals & nightly PRN 02/04/25 1903          Hold Oral hypoglycemics while patient is in the hospital.        VTE Risk Mitigation (From admission, onward)           Ordered     apixaban tablet 5 mg  2 times daily         02/09/25 1107                    Arely Rodriguez MD  Cardiology  [facility]

## 2025-02-11 NOTE — ASSESSMENT & PLAN NOTE
Patient admitted with acute on chronic heart failure exacerbation complicated by hypotension limiting diuresis. Has received fluid, albumin, and midodrine for BP support. Minimal charting of UOP and weight trend. TR and MR worse on most recent TTE though possibly due to volume overload rather than progression of disease and may be reversible. If remains severe MR and clinically improves may be candidate for cherie-clip. At this time, patient requires aggressive diuresis given deteriorating clinical status which is likely to require inotrope or vasopressor support given BP. He was transferred to CCU for further management.     Current Heart Failure Medications  torsemide tablet 20 mg, 2 times daily, Oral      Plan  - Monitor strict I&Os and daily weights.  -Torsemide 20 mg BID PO    - Place on telemetry  - Low sodium diet  - Place on fluid restriction of 1.5 L.

## 2025-02-11 NOTE — ASSESSMENT & PLAN NOTE
Patient has paroxysmal (<7 days) atrial fibrillation. Patient is currently in sinus rhythm. [id number] Score: 4. The patients heart rate in the last 24 hours is as follows:  Pulse  Min: 58  Max: 78     amiodarone tablet 200 mg, Daily, Oral  Eliquis 5mg bid     Plan  - Replete lytes with a goal of K>4, Mg >2  - Patient is anticoagulated, see medications listed above.  - Last ILR showing 16% burden of AF, if ischemic and valvular workup for drop in EF negative can consider arrhythmia as culprit. Continue to follow up with EP as an outpatient

## 2025-02-11 NOTE — CARE UPDATE
CCU Care Update Note    8PM     Reports blurry vision since today morning    On examination blurring on right temporal field, no posterior capsule opacification. No disturbance in speech, swallowing. Sensory 2+ on both sides face, all 4 extremities, power 5/5 in all 4 extremities     I/Os    I/O this shift:  In: 181 [P.O.:180; I.V.:1]  Out: -       Intake/Output Summary (Last 24 hours) at 2/10/2025 2112  Last data filed at 2/10/2025 2000  Gross per 24 hour   Intake 545.94 ml   Output 2650 ml   Net -2104.06 ml       Net IO Since Admission: -5,522.55 mL [02/10/25 2112]      Continuous Infusions:      furosemide (Lasix) 500 mg in 50 mL infusion (conc: 10 mg/mL)  10 mg/hr Intravenous Continuous 1 mL/hr at 02/10/25 2000 10 mg/hr at 02/10/25 2000         Plan:  -- Consult ophthalmology for blurry vision tomorrow morning  -- continue monitoring urine output closely.   -- continue current care    Case discussed with on call attending.    Du Mccrary MD  Cardiology Fellow, PGY4  [facility]

## 2025-02-12 VITALS
RESPIRATION RATE: 18 BRPM | BODY MASS INDEX: 18.95 KG/M2 | SYSTOLIC BLOOD PRESSURE: 134 MMHG | TEMPERATURE: 97 F | WEIGHT: 147.69 LBS | DIASTOLIC BLOOD PRESSURE: 80 MMHG | HEART RATE: 86 BPM | HEIGHT: 74 IN | OXYGEN SATURATION: 99 %

## 2025-02-12 LAB
ALBUMIN SERPL BCP-MCNC: 3.6 G/DL (ref 3.5–5.2)
ALP SERPL-CCNC: 162 U/L (ref 40–150)
ALT SERPL W/O P-5'-P-CCNC: 23 U/L (ref 10–44)
ANION GAP SERPL CALC-SCNC: 15 MMOL/L (ref 8–16)
AST SERPL-CCNC: 13 U/L (ref 10–40)
BILIRUB SERPL-MCNC: 0.9 MG/DL (ref 0.1–1)
BUN SERPL-MCNC: 69 MG/DL (ref 8–23)
CALCIUM SERPL-MCNC: 9.1 MG/DL (ref 8.7–10.5)
CHLORIDE SERPL-SCNC: 100 MMOL/L (ref 95–110)
CO2 SERPL-SCNC: 23 MMOL/L (ref 23–29)
CREAT SERPL-MCNC: 2.7 MG/DL (ref 0.5–1.4)
EST. GFR  (NO RACE VARIABLE): 23.8 ML/MIN/1.73 M^2
GLUCOSE SERPL-MCNC: 146 MG/DL (ref 70–110)
MAGNESIUM SERPL-MCNC: 2.1 MG/DL (ref 1.6–2.6)
PHOSPHATE SERPL-MCNC: 3.2 MG/DL (ref 2.7–4.5)
POCT GLUCOSE: 135 MG/DL (ref 70–110)
POCT GLUCOSE: 177 MG/DL (ref 70–110)
POTASSIUM SERPL-SCNC: 3.8 MMOL/L (ref 3.5–5.1)
PROT SERPL-MCNC: 6.3 G/DL (ref 6–8.4)
SODIUM SERPL-SCNC: 138 MMOL/L (ref 136–145)

## 2025-02-12 PROCEDURE — 36415 COLL VENOUS BLD VENIPUNCTURE: CPT | Mod: HCNC | Performed by: PHYSICIAN ASSISTANT

## 2025-02-12 PROCEDURE — 83735 ASSAY OF MAGNESIUM: CPT | Mod: HCNC | Performed by: STUDENT IN AN ORGANIZED HEALTH CARE EDUCATION/TRAINING PROGRAM

## 2025-02-12 PROCEDURE — 25000003 PHARM REV CODE 250: Mod: HCNC | Performed by: STUDENT IN AN ORGANIZED HEALTH CARE EDUCATION/TRAINING PROGRAM

## 2025-02-12 PROCEDURE — 25000003 PHARM REV CODE 250: Mod: HCNC | Performed by: INTERNAL MEDICINE

## 2025-02-12 PROCEDURE — 63600175 PHARM REV CODE 636 W HCPCS: Mod: HCNC | Performed by: STUDENT IN AN ORGANIZED HEALTH CARE EDUCATION/TRAINING PROGRAM

## 2025-02-12 PROCEDURE — 25000003 PHARM REV CODE 250: Mod: HCNC

## 2025-02-12 PROCEDURE — 80053 COMPREHEN METABOLIC PANEL: CPT | Mod: HCNC | Performed by: PHYSICIAN ASSISTANT

## 2025-02-12 PROCEDURE — 25000003 PHARM REV CODE 250: Mod: HCNC | Performed by: HOSPITALIST

## 2025-02-12 PROCEDURE — 25000003 PHARM REV CODE 250: Mod: HCNC | Performed by: PHYSICIAN ASSISTANT

## 2025-02-12 PROCEDURE — 84100 ASSAY OF PHOSPHORUS: CPT | Mod: HCNC | Performed by: STUDENT IN AN ORGANIZED HEALTH CARE EDUCATION/TRAINING PROGRAM

## 2025-02-12 RX ORDER — TORSEMIDE 20 MG/1
20 TABLET ORAL DAILY
Qty: 30 TABLET | Refills: 0 | Status: SHIPPED | OUTPATIENT
Start: 2025-02-12 | End: 2025-03-14

## 2025-02-12 RX ORDER — HYDROXYZINE HYDROCHLORIDE 25 MG/1
25 TABLET, FILM COATED ORAL 3 TIMES DAILY PRN
Qty: 30 TABLET | Refills: 0 | Status: SHIPPED | OUTPATIENT
Start: 2025-02-12 | End: 2025-02-17 | Stop reason: SDUPTHER

## 2025-02-12 RX ORDER — AMIODARONE HYDROCHLORIDE 200 MG/1
200 TABLET ORAL DAILY
Qty: 30 TABLET | Refills: 0 | Status: SHIPPED | OUTPATIENT
Start: 2025-02-13 | End: 2025-03-15

## 2025-02-12 RX ORDER — FAMOTIDINE 20 MG/1
20 TABLET, FILM COATED ORAL EVERY OTHER DAY
Qty: 15 TABLET | Refills: 11 | Status: SHIPPED | OUTPATIENT
Start: 2025-02-14 | End: 2025-02-12 | Stop reason: HOSPADM

## 2025-02-12 RX ADMIN — ALLOPURINOL 150 MG: 100 TABLET ORAL at 08:02

## 2025-02-12 RX ADMIN — COLCHICINE 0.6 MG: 0.6 TABLET ORAL at 08:02

## 2025-02-12 RX ADMIN — MIDODRINE HYDROCHLORIDE 5 MG: 5 TABLET ORAL at 08:02

## 2025-02-12 RX ADMIN — PREDNISONE 40 MG: 20 TABLET ORAL at 08:02

## 2025-02-12 RX ADMIN — ASPIRIN 81 MG: 81 TABLET, COATED ORAL at 08:02

## 2025-02-12 RX ADMIN — APIXABAN 5 MG: 5 TABLET, FILM COATED ORAL at 08:02

## 2025-02-12 RX ADMIN — ACETAMINOPHEN 650 MG: 325 TABLET ORAL at 06:02

## 2025-02-12 RX ADMIN — TORSEMIDE 20 MG: 20 TABLET ORAL at 08:02

## 2025-02-12 RX ADMIN — TORSEMIDE 20 MG: 20 TABLET ORAL at 04:02

## 2025-02-12 RX ADMIN — TAMSULOSIN HYDROCHLORIDE 0.4 MG: 0.4 CAPSULE ORAL at 08:02

## 2025-02-12 RX ADMIN — HYDROXYZINE HYDROCHLORIDE 25 MG: 25 TABLET, FILM COATED ORAL at 02:02

## 2025-02-12 RX ADMIN — MIDODRINE HYDROCHLORIDE 5 MG: 5 TABLET ORAL at 04:02

## 2025-02-12 RX ADMIN — SODIUM BICARBONATE 650 MG TABLET 1300 MG: at 08:02

## 2025-02-12 RX ADMIN — MUPIROCIN: 20 OINTMENT TOPICAL at 08:02

## 2025-02-12 RX ADMIN — MIDODRINE HYDROCHLORIDE 5 MG: 5 TABLET ORAL at 12:02

## 2025-02-12 RX ADMIN — AMIODARONE HYDROCHLORIDE 200 MG: 200 TABLET ORAL at 08:02

## 2025-02-12 RX ADMIN — FAMOTIDINE 20 MG: 20 TABLET ORAL at 08:02

## 2025-02-12 RX ADMIN — BRIMONIDINE TARTRATE 1 DROP: 2 SOLUTION OPHTHALMIC at 08:02

## 2025-02-12 NOTE — NURSING TRANSFER
Nursing Transfer Note      2/11/2025   1710 PM    Nurse giving handoff:Satish.RN  Nurse receiving handoff:Emil.RN    Reason patient is being transferred: stepdown    Transfer To: room 310    Transfer via wheelchair    Transfer with cardiac monitoring    Transported by Satish.RN    Telemetry: Box Number [id number]  Order for Tele Monitor? Yes    Any special needs or follow-up needed: monitor VS    Patient belongings transferred with patient: Yes    Chart send with patient: Yes    Patient reassessed at: 1710 02/11/2025    Upon arrival to floor: cardiac monitor applied, patient oriented to room, call bell in reach, and bed in lowest position

## 2025-02-12 NOTE — DISCHARGE SUMMARY
[facility] - Cardiology [facility] Medicine  Discharge Summary      Patient Name: Jarrett Charlton Jr.  MRN: [id number]  Admission Date: 2/3/2025  Hospital Length of Stay: 8 days  Discharge Date and Time:  02/12/2025 11:51 AM  Attending Physician: Dallas Condon MD   Discharging Provider: Dallas Condon MD  Discharge Provider Team: [facility] HOSP MED J  Primary Care Provider: Tripp Mohan MD        HPI: 74 y.o. male with a PMH of CAD s/p PCI to mid LAD in 2017, Hx of VT, Atrial tachycardia s/p AT ablation in 4/2024 and DVT on Eliquis, CHF with the EF of 35%, SBO, s/p colostomy, hypertension, hyperlipidemia, and DM2 presenting to the ED for worsening lower extremity swelling and abdominal pain. Patient was recently discharged from [facility] on 1/30 for workup of DVT, found to be superficial vein thrombosis. Hospital course c/b GRADY. Patient also found to have new pancreatic lesion with plans for outpatient ERCP. Patient states since discharge he has had worsening abdominal cramping and bloating. Notes decreased ostomy output. Was initially passing gas but states he has not been able to for the past 3 days. Denies fever, chills, N/V. Patient also reporting worsening BLE edema since discharge. States he was told to stop taking his home lasix due to his GRADY with plans to restart in on Monday, 2/10. Patient also notes 3 weeks of generalized pruritus that has progressively worsened.     * No surgery found *      Hospital Course:  Pt initially presented with decreased ostomy output and LE edema, found to have LE DVT. Also noted to have GRADY on top of CKD, and with hypotension he was given aggressive albumin and IV supplementation. Patient admitted with acute on chronic HF exacerbation complicated by hypotension limiting diuresis Transferred to CCU from  when he was noted to have severe pulmonary edema, in the setting of underlying hypotension. Echo significant for newly depressed EF     Patient has received agressive dieuresis  while in CCU and was transitioned to PO Torsemide. He was also started on Prednisone for gout.     On day of discharge he reports being near his baseline. Denies dsypnea or chest pain.     Incidentally a Pancreatic mass was found and a referral to GI has been placed at discharge.     He was also seen by dermatology for generalized itching and started on Hydroxyzine PRN      Follow up  -Pancreatic mass ( GI referral ordered). Will need outpatient ERCP  -Newly depressed EF ( Cardiology follow up scheduled)       Consults:   Consults (From admission, onward)          Status Ordering Provider     Inpatient consult to Cardiology  Once        Provider:  (Not yet assigned)    Completed HAWA WILLIAMSON     Inpatient consult to Dermatology  Once        Provider:  (Not yet assigned)    Completed ROSEMARIE MURPHY            Final Active Diagnoses:    Diagnosis Date Noted POA    PRINCIPAL PROBLEM:  Acute on chronic congestive heart failure [I50.9] 09/24/2024 Yes    Atelectasis [J98.11] 02/06/2025 Yes    Pancreatic mass [K86.89] 02/03/2025 Yes    History of DVT (deep vein thrombosis) [Z86.718] 10/03/2024 Not Applicable    Paroxysmal atrial fibrillation [I48.0] 09/11/2024 Yes    Pulmonary hypertension [I27.20] 07/09/2024 Yes    Ileostomy in place [Z93.2] 11/09/2020 Not Applicable    Hypotension [I95.9] 12/06/2017 Yes    CAD (coronary artery disease) [I25.10] 11/10/2017 Yes    Chronic renal impairment, stage 4 (severe) [N18.4]  Yes    History of ulcerative colitis [Z87.19]  Not Applicable    T2DM (type 2 diabetes mellitus) [E11.9]  Yes    BPH (benign prostatic hyperplasia) [N40.0]  Yes    GERD (gastroesophageal reflux disease) [K21.9]  Yes      Problems Resolved During this Admission:      Discharged Condition: stable    Disposition: Home or Self Care    Follow Up:    Patient Instructions:      Ambulatory referral/consult to Heart Failure Transitional Care Clinic   Standing Status: Future   Referral Priority: Routine Referral  Type: Consultation   Referral Reason: Specialty Services Required   Requested Specialty: Cardiology   Number of Visits Requested: 1     Ambulatory referral/consult to Acute Care at Home   Standing Status: Future   Referral Priority: Routine Referral Type: Consultation   Referred to Provider: ENRIKE PROVIDER Requested Specialty: Internal Medicine   Number of Visits Requested: 1     Ambulatory referral/consult to Gastroenterology   Standing Status: Future   Referral Priority: Routine Referral Type: Consultation   Referral Reason: Specialty Services Required   Requested Specialty: Gastroenterology   Number of Visits Requested: 1     Diet Adult Regular     Notify your health care provider if you experience any of the following:  difficulty breathing or increased cough     Activity as tolerated     Medications:  Reconciled Home Medications:      Medication List        START taking these medications      hydrOXYzine HCL 25 MG tablet  Commonly known as: ATARAX  Take 1 tablet (25 mg total) by mouth 3 (three) times daily as needed for Itching.     torsemide 20 MG Tab  Commonly known as: DEMADEX  Take 1 tablet (20 mg total) by mouth once daily.            CHANGE how you take these medications      amiodarone 200 MG Tab  Commonly known as: PACERONE  Take 1 tablet (200 mg total) by mouth once daily.  Start taking on: February 13, 2025  What changed: when to take this            CONTINUE taking these medications      ACCU-CHEK PAUL CONTROL SOLN Soln  Generic drug: blood glucose control high,low  1 drop by NOT APPLICABLE route once daily.     ACCU-CHEK SOFTCLIX LANCETS Misc  Generic drug: lancets  TEST BLOOD SUGAR THREE TIMES DAILY     alcohol swabs Padm  Commonly known as: DROPSAFE ALCOHOL PREP PADS  Apply 1 each topically once daily.     allopurinoL 100 MG tablet  Commonly known as: ZYLOPRIM  Take 4 tablets (400 mg total) by mouth once daily.     apixaban 5 mg Tab  Commonly known as: ELIQUIS  Take 1 tablet (5 mg total) by  mouth 2 (two) times daily.     blood sugar diagnostic Strp  Commonly known as: ACCU-CHEK GUIDE TEST STRIPS  1 each by Other route 3 (three) times daily. Test Blood Sugar     blood-glucose meter Misc  Commonly known as: ACCU-CHEK GUIDE GLUCOSE METER  Accucheck BID     brimonidine 0.2% 0.2 % Drop  Commonly known as: ALPHAGAN  Place 1 drop into both eyes 2 (two) times a day.     colchicine 0.6 mg tablet  Commonly known as: COLCRYS  Take 1 tablet (0.6 mg total) by mouth every other day.     docusate sodium 100 mg capsule  Take 100 mg by mouth 2 (two) times daily as needed for Constipation.     ferrous sulfate 325 mg (65 mg iron) Tab tablet  Commonly known as: IRON  Take 1 tablet (325 mg total) by mouth every other day.     glimepiride 4 MG tablet  Commonly known as: AMARYL  TAKE 1 TABLET TWICE DAILY BEFORE MEALS     I-CAPS ORAL  Take 1 capsule by mouth once daily.     metoprolol succinate 25 MG 24 hr tablet  Commonly known as: TOPROL-XL  Take 1 tablet (25 mg total) by mouth once daily.     midodrine 2.5 MG Tab  Commonly known as: PROAMATINE  Take 1 tablet (2.5 mg total) by mouth 2 (two) times daily with meals.     pantoprazole 40 MG tablet  Commonly known as: PROTONIX  TAKE 1 TABLET ONE TIME DAILY     pravastatin 40 MG tablet  Commonly known as: PRAVACHOL  Take 1 tablet (40 mg total) by mouth once daily.     predniSONE 2.5 MG tablet  Commonly known as: DELTASONE  Take 2.5 mg by mouth every other day.     sodium bicarbonate 650 MG tablet  Take 2 tablets (1,300 mg total) by mouth 2 (two) times daily.     tamsulosin 0.4 mg Cap  Commonly known as: FLOMAX  Take 1 capsule (0.4 mg total) by mouth once daily.            STOP taking these medications      famotidine 20 MG tablet  Commonly known as: PEPCID     furosemide 40 MG tablet  Commonly known as: LASIX              Significant Diagnostic Studies: Cardiac Graphics: Echocardiogram: Transthoracic echo (TTE) complete (Cupid Only):   Results for orders placed or performed  during the hospital encounter of 01/24/25   Echo   Result Value Ref Range    BSA 1.95 m2    LVOT stroke volume 51.9 cm3    LVIDd 5.9 3.5 - 6.0 cm    LV Systolic Volume 131.90 mL    LV Systolic Volume Index 66.6 mL/m2    LVIDs 5.2 (A) 2.1 - 4.0 cm    LV Diastolic Volume 171.94 mL    LV Diastolic Volume Index 86.84 mL/m2    Left Ventricular End Systolic Volume by Teichholz Method 131.90 mL    Left Ventricular End Diastolic Volume by Teichholz Method 171.94 mL    IVS 0.9 0.6 - 1.1 cm    LVOT diameter 2.3 cm    LVOT area 4.2 cm2    FS 11.9 (A) 28 - 44 %    Left Ventricle Relative Wall Thickness 0.27 cm    PW 0.8 0.6 - 1.1 cm    LV mass 195.0 g    LV Mass Index 98.5 g/m2    MV Peak E Joao 0.94 m/s    TR Max Joao 2.6 m/s    IVRT 46 msec    E wave deceleration time 132 msec    PV Peak S Joao 0.61 m/s    LVOT peak joao 0.8 m/s    Left Ventricular Outflow Tract Mean Velocity 0.58 cm/s    Left Ventricular Outflow Tract Mean Gradient 1.45 mmHg    LA size 4.6 cm    Left Atrium Minor Axis 6.6 cm    Left Atrium Major Axis 7.5 cm    RA Major Axis 5.91 cm    AV mean gradient 2 mmHg    AV peak gradient 3 mmHg    Ao peak joao 0.8 m/s    Ao VTI 10.4 cm    LVOT peak VTI 12.5 cm    AV valve area 5.0 cm²    AV Velocity Ratio 1.00     AV index (prosthetic) 1.20     KJ by Velocity Ratio 4.2 cm²    Mr max joao 4.48 m/s    MV stenosis pressure 1/2 time 38.15 ms    MV valve area p 1/2 method 5.77 cm2    Triscuspid Valve Regurgitation Peak Gradient 27 mmHg    PV PEAK VELOCITY 0.58 m/s    PV peak gradient 1 mmHg    IVC diameter 2.60 cm    ZLVIDS 3.10     ZLVIDD 0.32     JAKI 67 mL/m2    LA Vol 132 cm3    LA WIDTH 4.8 cm    RA Width 5.2 cm    TV resting pulmonary artery pressure 42 mmHg    RV TB RVSP 18 mmHg    Est. RA pres 15 mmHg    EF 30 %    Narrative      Left Ventricle: The left ventricle is mildly dilated. Ventricular mass   is normal. Normal wall thickness. Severe global hypokinesis present. There   is severely reduced systolic function.  Ejection fraction is approximately   30%. Grade I diastolic dysfunction.    Right Ventricle: Normal right ventricular cavity size. Wall thickness   is normal. Right ventricle wall motion has global hypokinesis. Systolic   function is moderately reduced.    Left Atrium: Left atrium is severely dilated.    Right Atrium: Right atrium is moderately dilated.    Aortic Valve: The aortic valve is a trileaflet valve. There is mild   aortic valve sclerosis. There is mild aortic regurgitation with a   centrally directed jet.    Mitral Valve: There is severe regurgitation with a centrally directed   jet.    Tricuspid Valve: There is moderate to severe regurgitation with a   centrally directed jet.    Pulmonary Artery: There is mild to moderate pulmonary hypertension. The   estimated pulmonary artery systolic pressure is 42 mmHg.    IVC/SVC: Elevated venous pressure at 15 mmHg.    Pericardium: There is a small circumferential effusion. Left pleural   effusion.         Pending Diagnostic Studies:       None          Indwelling Lines/Drains at time of discharge:   Lines/Drains/Airways       Drain  Duration                  Ileostomy 01/01/84 RUQ 82805 days                    Time spent on the discharge of patient: 30 minutes         Dallas Condon MD  Department of Hospital Medicine  [facility] - Cardiology Stepdown

## 2025-02-12 NOTE — PLAN OF CARE
Pt dx acute on chronic congestive heart failure. AAOX4. Stable. Discussed current plan of care.voiced understanding. Callbell within reach. Pt independent.

## 2025-02-12 NOTE — PLAN OF CARE
2:03 PM- Message left for patient's daughter regarding IMM. Awaiting call back.    2:05 PM- CHW met with patient/family at bedside. Patient experience rounding completed and reviewed the following.     Do you know your discharge plan? Yes or No,      If yes, what is the plan? (Home, Home Health, Rehab, SNF, LTAC, or Other)  Yes Home    Have you discussed your needs and preferences with your SW/CM? Yes or No  Yes Home    If you are discharging home, do you have help at home? Yes or No Yes (daughter)    Do you think you will need help additional at home at discharge? Yes or No   No    Do you currently have difficulty keeping up with bills, affording medicine or buying food? Yes or No No    Assigned SW/CM notified of any patient/family needs or concerns. Appropriate resources provided to address patient's needs.  Ivette Herron, JOSE JUAN, RSW, BSW  Case Management  [phone]     No

## 2025-02-12 NOTE — PLAN OF CARE
[facility] - Cardiology Stepdown  Discharge Final Note    Primary Care Provider: Tripp Mohan MD    Expected Discharge Date: 2/12/2025    Final Discharge Note (most recent)       Final Note - 02/12/25 1213          Final Note    Assessment Type Final Discharge Note     Anticipated Discharge Disposition Home or Self Care                   Ivette Garcia LMSW [facility]  [id number]      Important Message from Medicare  Important Message from Medicare regarding Discharge Appeal Rights: Given to patient/caregiver, Explained to patient/caregiver, Signed/date by patient/caregiver     Date IMM was signed: 02/06/25  Time IMM was signed: 1416      Future Appointments   Date Time Provider Department Center   2/13/2025 10:00 AM Almaz Hernández MD [facility] [id number]   2/18/2025  8:30 AM INFUSION, CHAIR 6 [facility] AMB INF [facility]   2/21/2025 10:40 AM Marivel Yo NP [facility] UROLOGY [facility]   2/25/2025  8:20 AM Tripp Mohan MD [facility] IM [address] [facility]   2/25/2025  8:45 AM LAB, SPECIMEN [facility] INTMED [facility] SPLABIM [address]   2/25/2025  8:50 AM LAB, APPOINTMENT [facility] INTMED [facility] LAB IM [address] [facility]   2/25/2025  9:30 AM INFUSION, CHAIR 1 [facility] AMB INF [facility]   3/3/2025  8:30 AM INFUSION, CHAIR 9 [facility] AMB INF [facility]   3/6/2025  3:00 PM Rivas Villeda MD [facility]   3/10/2025  8:00 AM INFUSION, CHAIR 6 [facility] AMB INF [facility]   3/14/2025  9:40 AM Zafar Rojas MD [facility] CARDIO [facility]   3/17/2025  8:00 AM INFUSION, CHAIR 6 [facility] AMB INF [facility]   3/20/2025 10:00 AM Anup Mukherjee MD [facility] NEPHRO [facility]   3/20/2025 11:00 AM Momo Hallman PA-C [facility] AENDGAS [facility]   3/27/2025  2:20 PM Catarino Mota MD [facility] UROLOGY [facility]   4/7/2025  9:00 AM EKG, APPT [facility] EKG [facility]   4/7/2025  9:30 AM Ivette Grover, NICKY [facility] ARRHYTH [facility]

## 2025-02-12 NOTE — PLAN OF CARE
Problem: Adult Inpatient Plan of Care  Goal: Plan of Care Review  2/12/2025 0309 by Waldemar Tanner RN  Outcome: Progressing  2/12/2025 0308 by Waldemar Tanner RN  Outcome: Progressing  Goal: Patient-Specific Goal (Individualized)  2/12/2025 0309 by Waldemar Tanner RN  Outcome: Progressing  2/12/2025 0308 by Waldemar Tanner RN  Outcome: Progressing  Goal: Absence of Hospital-Acquired Illness or Injury  2/12/2025 0309 by Waldemar Tanner RN  Outcome: Progressing  2/12/2025 0308 by Waldemar Tanner RN  Outcome: Progressing  Goal: Optimal Comfort and Wellbeing  2/12/2025 0309 by Waldemar Tanner RN  Outcome: Progressing  2/12/2025 0308 by Waldemar Tanner RN  Outcome: Progressing  Goal: Readiness for Transition of Care  2/12/2025 0309 by Waldemar Tanner RN  Outcome: Progressing  2/12/2025 0308 by Waldemar Tanner RN  Outcome: Progressing     Problem: Heart Failure  Goal: Optimal Coping  2/12/2025 0309 by Waldemar Tanner RN  Outcome: Progressing  2/12/2025 0308 by Waldemar Tanner RN  Outcome: Progressing  Goal: Optimal Cardiac Output  2/12/2025 0309 by Waldemar Tanner RN  Outcome: Progressing  2/12/2025 0308 by Waldemar Tanner RN  Outcome: Progressing  Goal: Stable Heart Rate and Rhythm  2/12/2025 0309 by Waldemar Tanner RN  Outcome: Progressing  2/12/2025 0308 by Waldemar Tanner RN  Outcome: Progressing  Goal: Optimal Functional Ability  2/12/2025 0309 by Waldemar Tanner RN  Outcome: Progressing  2/12/2025 0308 by Waldemar Tanner RN  Outcome: Progressing  Goal: Fluid and Electrolyte Balance  2/12/2025 0309 by Waldemar Tanner RN  Outcome: Progressing  2/12/2025 0308 by Waldemar Tanner, RN  Outcome: Progressing  Goal: Improved Oral Intake  2/12/2025 0309 by Waldemar Tanner RN  Outcome: Progressing  2/12/2025 0308 by Waldemar Tanner, RN  Outcome: Progressing  Goal: Effective Oxygenation and Ventilation  2/12/2025 0309 by Waldemar Tanner, RN  Outcome:  Progressing  2/12/2025 0308 by Waldemar Tanner RN  Outcome: Progressing  Goal: Effective Breathing Pattern During Sleep  2/12/2025 0309 by Waldemar Tanner RN  Outcome: Progressing  2/12/2025 0308 by Waldemar Tanner RN  Outcome: Progressing     Problem: Diabetes Comorbidity  Goal: Blood Glucose Level Within Targeted Range  2/12/2025 0309 by Waldemar Tanner RN  Outcome: Progressing  2/12/2025 0308 by Waldemar Tanner RN  Outcome: Progressing     Problem: Acute Kidney Injury/Impairment  Goal: Fluid and Electrolyte Balance  2/12/2025 0309 by Waldemar Tanner RN  Outcome: Progressing  2/12/2025 0308 by Waldemar Tanner RN  Outcome: Progressing  Goal: Improved Oral Intake  2/12/2025 0309 by Waldemar Tanner RN  Outcome: Progressing  2/12/2025 0308 by Waldemar Tanner RN  Outcome: Progressing  Goal: Effective Renal Function  2/12/2025 0309 by Waldemar Tanner RN  Outcome: Progressing  2/12/2025 0308 by Waldemar Tanner RN  Outcome: Progressing     Problem: Wound  Goal: Optimal Coping  2/12/2025 0309 by Waldemar Tanner RN  Outcome: Progressing  2/12/2025 0308 by Waldemar Tanner RN  Outcome: Progressing  Goal: Optimal Functional Ability  2/12/2025 0309 by Waldemar Tanner RN  Outcome: Progressing  2/12/2025 0308 by Waldemar Tanner RN  Outcome: Progressing  Goal: Absence of Infection Signs and Symptoms  2/12/2025 0309 by Waldemar Tanner RN  Outcome: Progressing  2/12/2025 0308 by Waldemar Tanner, RN  Outcome: Progressing  Goal: Improved Oral Intake  2/12/2025 0309 by Waldemar Tanner RN  Outcome: Progressing  2/12/2025 0308 by Waldemar Tanner RN  Outcome: Progressing  Goal: Optimal Pain Control and Function  2/12/2025 0309 by Waldemar Tanner RN  Outcome: Progressing  2/12/2025 0308 by Waldemar Tanner RN  Outcome: Progressing  Goal: Skin Health and Integrity  2/12/2025 0309 by Waldemar Tanner, RN  Outcome: Progressing  2/12/2025 0308 by Waldemar Tanner, RN  Outcome:  Progressing  Goal: Optimal Wound Healing  2/12/2025 0309 by Waldemar Tanner, RN  Outcome: Progressing  2/12/2025 0308 by Waldemar Tanner, RN  Outcome: Progressing     Problem: Fall Injury Risk  Goal: Absence of Fall and Fall-Related Injury  2/12/2025 0309 by Waldemar Tanner, RN  Outcome: Progressing  2/12/2025 0308 by Waldemar Tanner, RN  Outcome: Progressing     Problem: Skin Injury Risk Increased  Goal: Skin Health and Integrity  2/12/2025 0309 by Waldemar Tanner, RN  Outcome: Progressing  2/12/2025 0308 by Waldemar Tanner, RN  Outcome: Progressing

## 2025-02-17 ENCOUNTER — TELEPHONE (OUTPATIENT)
Dept: UROLOGY | Facility: CLINIC | Age: 75
End: 2025-02-17
Payer: MEDICARE

## 2025-02-17 ENCOUNTER — PATIENT MESSAGE (OUTPATIENT)
Dept: UROLOGY | Facility: CLINIC | Age: 75
End: 2025-02-17
Payer: MEDICARE

## 2025-02-17 NOTE — TELEPHONE ENCOUNTER
----- Message from Jemima sent at 2/17/2025  1:55 PM CST -----  Regarding: Medical Assistance  Contact: Patient  Type:  Needs Medical AdviceWho Called: Patient Symptoms (please be specific):  bladder infection  How long has patient had these symptoms:  2 days Pharmacy name and phone #:   [facility] [address] [place] [address] Phone: [phone] the patient rather a call back or a response via [facility]? Call back Best Call Back Number: [phone] Information: Patient stated he believes he may have a bladder infection and would like to complete a urine specimen for testing please assist

## 2025-02-17 NOTE — TELEPHONE ENCOUNTER
Pt notified that dr. Mota is out on vacation and that he can either ask his PCP or go to a local urgent care per urology policy.

## 2025-02-17 NOTE — TELEPHONE ENCOUNTER
----- Message from Nanda sent at 2/17/2025 12:10 PM CST -----  Contact: self   Requesting an RX refill or new RX.Is this a refill or new RX: New RX name and strength (copy/paste from chart):  hydrOXYzine HCL (ATARAX) 25 MG tabletIs this a 30 day or 90 day RX: 90 dayPharmacy name and phone # (copy/paste from chart):  [facility] [address] [facility] [facility] [address]Awwcp: [phone] Fax: [phone] doctors have asked that we provide their patients with the following 2 reminders -- prescription refills can take up to 72 hours, and a friendly reminder that in the future you can use your [facility] account to request refills: yes

## 2025-02-18 RX ORDER — HYDROXYZINE HYDROCHLORIDE 25 MG/1
25 TABLET, FILM COATED ORAL 3 TIMES DAILY PRN
Qty: 30 TABLET | Refills: 0 | Status: SHIPPED | OUTPATIENT
Start: 2025-02-18 | End: 2025-02-28

## 2025-02-18 NOTE — TELEPHONE ENCOUNTER
Refill Routing Note   Medication(s) are not appropriate for processing by [facility] for the following reason(s):        Outside of protocol    [facility] action(s):  Route             Appointments  past 12m or future 3m with PCP    Date Provider   Last Visit   11/6/2024 Tripp Mohan MD   Next Visit   2/25/2025 Tripp Mohan MD   ED visits in past 90 days: 2        Note composed:10:53 PM 02/17/2025

## 2025-02-19 ENCOUNTER — TELEPHONE (OUTPATIENT)
Dept: UROLOGY | Facility: CLINIC | Age: 75
End: 2025-02-19
Payer: MEDICARE

## 2025-02-19 NOTE — TELEPHONE ENCOUNTER
This call is to remind you of your upcoming appointment on 2/21/2025 at 10:40 am located on the 4th floor clinic [facility] on [address].  Thanks VS

## 2025-02-20 DIAGNOSIS — N18.4 STAGE 4 CHRONIC KIDNEY DISEASE: Primary | ICD-10-CM

## 2025-02-21 ENCOUNTER — INFUSION (OUTPATIENT)
Dept: INFECTIOUS DISEASES | Facility: HOSPITAL | Age: 75
End: 2025-02-21
Payer: MEDICARE

## 2025-02-21 ENCOUNTER — OFFICE VISIT (OUTPATIENT)
Dept: UROLOGY | Facility: CLINIC | Age: 75
End: 2025-02-21
Payer: MEDICARE

## 2025-02-21 VITALS
WEIGHT: 150.25 LBS | DIASTOLIC BLOOD PRESSURE: 56 MMHG | HEIGHT: 74 IN | BODY MASS INDEX: 19.28 KG/M2 | OXYGEN SATURATION: 98 % | TEMPERATURE: 98 F | HEART RATE: 70 BPM | SYSTOLIC BLOOD PRESSURE: 103 MMHG | RESPIRATION RATE: 18 BRPM

## 2025-02-21 VITALS
HEART RATE: 71 BPM | BODY MASS INDEX: 19.38 KG/M2 | DIASTOLIC BLOOD PRESSURE: 64 MMHG | SYSTOLIC BLOOD PRESSURE: 104 MMHG | WEIGHT: 151 LBS | HEIGHT: 74 IN

## 2025-02-21 DIAGNOSIS — Z87.19 STATUS POST REPAIR OF VENTRAL HERNIA: ICD-10-CM

## 2025-02-21 DIAGNOSIS — Z87.898 HISTORY OF ELEVATED PSA: ICD-10-CM

## 2025-02-21 DIAGNOSIS — E87.20 METABOLIC ACIDOSIS WITH NORMAL ANION GAP AND BICARBONATE LOSSES: Primary | ICD-10-CM

## 2025-02-21 DIAGNOSIS — Z98.890 STATUS POST REPAIR OF VENTRAL HERNIA: ICD-10-CM

## 2025-02-21 DIAGNOSIS — Z87.438 HISTORY OF BPH: ICD-10-CM

## 2025-02-21 DIAGNOSIS — R97.20 ELEVATED PSA: ICD-10-CM

## 2025-02-21 DIAGNOSIS — N40.0 BENIGN PROSTATIC HYPERPLASIA WITHOUT LOWER URINARY TRACT SYMPTOMS: Primary | ICD-10-CM

## 2025-02-21 DIAGNOSIS — Z90.79 S/P TURP: ICD-10-CM

## 2025-02-21 PROCEDURE — 63600175 PHARM REV CODE 636 W HCPCS: Mod: HCNC | Performed by: INTERNAL MEDICINE

## 2025-02-21 PROCEDURE — 96374 THER/PROPH/DIAG INJ IV PUSH: CPT | Mod: HCNC

## 2025-02-21 RX ORDER — EPINEPHRINE 0.3 MG/.3ML
0.3 INJECTION SUBCUTANEOUS ONCE AS NEEDED
OUTPATIENT
Start: 2025-02-28

## 2025-02-21 RX ORDER — HEPARIN 100 UNIT/ML
500 SYRINGE INTRAVENOUS
OUTPATIENT
Start: 2025-02-28

## 2025-02-21 RX ORDER — SODIUM CHLORIDE 0.9 % (FLUSH) 0.9 %
10 SYRINGE (ML) INJECTION
Status: DISCONTINUED | OUTPATIENT
Start: 2025-02-21 | End: 2025-02-21 | Stop reason: HOSPADM

## 2025-02-21 RX ORDER — SODIUM CHLORIDE 0.9 % (FLUSH) 0.9 %
10 SYRINGE (ML) INJECTION
OUTPATIENT
Start: 2025-02-28

## 2025-02-21 RX ORDER — DIPHENHYDRAMINE HYDROCHLORIDE 50 MG/ML
50 INJECTION INTRAMUSCULAR; INTRAVENOUS ONCE AS NEEDED
OUTPATIENT
Start: 2025-02-28

## 2025-02-21 RX ADMIN — IRON SUCROSE 100 MG: 20 INJECTION, SOLUTION INTRAVENOUS at 08:02

## 2025-02-21 NOTE — PROGRESS NOTES
Chief Complaint:  Lupron injection       HPI:  Jarrett Charlton Jr. is a 70 y.o. man who is here for the evaluation of elevated PSA.  He had Lupron injection treatment for suspected prostate cancer with rising PSA      a history of elevated PSA, negative TUR biopsy in January. History of APR for crohn's disease.  MRI- 15 ccs. PI-RADS 4, 1 cm lesion, right base PZ. Negative extra prostatic extension,NVB,SV nodes.    2/21/2025- He is here today for his Lupron injection. He states he recently lost 50+ lbs. He is doing well overall. He reports he had a bladder infection last week and is still taking antibiotics for it.      Hx of no rectum as well as concerning PIRADS lesion on MRI.   Had TURP for biopsying suspicious area      Date: 01/23/2019  Procedure(s) Performed:   TURP  Findings:   Open bladder neck some regrowth of adenoma and suspicious area in right mid prostate   Right side of prostate resected and sent for pathology  SPECIMEN  1) Right prostate chips.  FINAL PATHOLOGIC DIAGNOSIS  Right prostate gland, transurethral resection:  - Benign prostatic tissue with nodular stromal hyperplasia  - Negative for malignancy     Since TURP, he noted that he can void better with better urine flow.  Blood in urine resolved.  However, he reports Occasional ZEB but it got all better.  No more ZEB reported.  He is here with another MRI of prostate following TURP because of still rising PSA up to 6.3 from 4.1.     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  He was not interested in SUDS.  S/p colon surgery and his anus is closed.  Therefore dong TRUS bx of prostate is not feasible.  After discussion, we decided to try finasteride to see whether it will lower his PSA.   He has been on finasteride for the past couple of years. Following TURP in 1/2019, we decided to stop taking finasteride.     When he was considered to undergo TURP, we could  not do his surgery because he was not able to stop ASA or Plavix due to fresh vascular stent.  He got a clearance that he can hold off plavix and ASA prior to TURP.  Hx of ulcerative colitis, had colon surgery back in 1985 and his anus was closed.  Has a neuropathy on the right leg.  Hx of sciatic nerve on both sides and received injection on the back.  Hx of diabetes diagnosed 2 years ago.  No family hx of prostate cancer.  He is a .  Denies flank pain, dysuira, hematuria .       MRI of prostate 8/20/19  Previous biopsy: Negative on 01/23/2019  PSA: 6.3 ng/mL (08/16/2019)  Prior therapy: TURP  Prostate: 3.6 x 3.5 x 3.0 cm corresponding to a computed volume of 15.2 cc.  Peripheral zone: Focal abnormalities with imaging features concerning for prostate cancer.  Lesion (MARYCARMEN) #1  Location: Side: right; Region: base; Zone: posterior peripheral zone laterally  Greatest dimension: 1.0 cm  T2-WI: T2 SI: circumscribed, homogeneous moderate hypointensity--score 4 or 5  DWI/ADC: DWI: Focal markedly hypointense on ADC and markedly hyperintense on high b-value DWI--score 4 or 5  DCE: Positive  Extraprostatic extension: No gross extraprostatic extension noting postsurgical changes limits evaluation.  PI-RADS assessment category: 4    Transitional zone: Mostly surgically resected.  Neurovascular bundle: Normal.  Seminal vesicles:  SV invasion: None  Adjacent Organ Involvement: No focal bladder wall thickening.  No rectal involvement.  Lymphadenopathy: None     On 11/22/19, pt underwent perineal biopsy by IR for CT guided needle bx for the right posterolateral prostate lesion;  Elevated prostate specific antigen (PSA)  Pathology:  BIOPSY OF PROSTATE BED:  FIBROFATTY TISSUE WITH NO NEOPLASIA IDENTIFIED     Pt had his PSA repeated and is now elevated to 7.9.  He was very anxious about possible prostate cancer and would like to to something about it.  So we have started ADT.  He is here with PSA follow up.  Denies any  problems. No hot flashes, decreased energy level.    Past Medical History:   Diagnosis Date    Anticoagulant long-term use     Basal cell carcinoma     BPH (benign prostatic hyperplasia)     s/p TURP    Carotid stenosis     Chronic kidney disease     Claudication     DDD (degenerative disc disease) 10/21/2013    Diabetes mellitus with renal complications     Disc disease, degenerative, cervical     DVT (deep venous thrombosis)     Encounter for blood transfusion     GERD (gastroesophageal reflux disease)     Gout, chronic     History of ulcerative colitis     s/p colectomy and ileostomy    HLD (hyperlipidemia)     HTN (hypertension)     Ileostomy in place 1982    Paroxysmal atrial fibrillation     RBBB     Squamous cell carcinoma 03/08/2018    Left superior helix near insertion    Squamous cell carcinoma 04/12/2018    Left forearm x 5    Syncope 07/06/2024    Ventricular tachycardia      Past Surgical History:   Procedure Laterality Date    ABLATION, SVT, ACCESSORY PATHWAY N/A 4/30/2024    Procedure: Ablation, SVT, Accessory Pathway;  Surgeon: Franky Taylor MD;  Location: [facility] EP LAB;  Service: Cardiology;  Laterality: N/A;  VT, RFA, Carto, MAC, GP, 3 Prep *MDT ILR in situ*    cardiac stents      CATARACT EXTRACTION Bilateral     CERVICAL FUSION      CHOLECYSTECTOMY N/A 2/14/2023    Procedure: CHOLECYSTECTOMY;  Surgeon: Mike Joyce MD;  Location: [facility] OR;  Service: General;  Laterality: N/A;  very high probabilty of converison to open    colectomy and ileostomy  1985    ENDOSCOPIC ULTRASOUND OF UPPER GASTROINTESTINAL TRACT N/A 2/10/2023    Procedure: ULTRASOUND, UPPER GI TRACT, ENDOSCOPIC;  Surgeon: Poli Fuller MD;  Location: [facility] ENDO;  Service: Endoscopy;  Laterality: N/A;    ERCP N/A 2/10/2023    Procedure: ERCP (ENDOSCOPIC RETROGRADE CHOLANGIOPANCREATOGRAPHY);  Surgeon: Poli Fuller MD;  Location: [facility] ENDO;  Service: Endoscopy;  Laterality: N/A;    EXCISION OF PAROTID GLAND Left 12/18/2020     Procedure: EXCISION, PAROTID GLAND;  Surgeon: Michael Pinzon MD;  Location: [facility] OR 2ND FLR;  Service: ENT;  Laterality: Left;    INSERTION OF IMPLANTABLE LOOP RECORDER  06/07/2021    INSERTION OF IMPLANTABLE LOOP RECORDER Left 6/7/2021    Procedure: INSERTION, IMPLANTABLE LOOP RECORDER;  Surgeon: Romario Dao MD;  Location: [facility] CATH LAB;  Service: Cardiology;  Laterality: Left;    LEFT HEART CATHETERIZATION Right 4/15/2021    Procedure: CATHETERIZATION, HEART, LEFT;  Surgeon: Marcio Jones MD;  Location: [facility] CATH LAB;  Service: Cardiology;  Laterality: Right;    LYSIS OF ADHESIONS N/A 11/9/2020    Procedure: LYSIS, ADHESIONS,  ERAS low;  Surgeon: CED Haley MD;  Location: [facility] OR 2ND FLR;  Service: Colon and Rectal;  Laterality: N/A;    LYSIS OF ADHESIONS N/A 2/14/2023    Procedure: LYSIS, ADHESIONS;  Surgeon: Mike Joyce MD;  Location: [facility];  Service: General;  Laterality: N/A;    POUCHOSCOPY N/A 4/6/2022    Procedure: ENDOSCOPY, POUCH, SMALL INTESTINE, DIAGNOSTIC;  Surgeon: Dieter Juarez MD;  Location: [facility] ENDO (2ND FLR);  Service: Endoscopy;  Laterality: N/A;    REPAIR, HERNIA, PARASTOMAL N/A 11/9/2020    Procedure: REPAIR, HERNIA, PARASTOMAL;  Surgeon: CED Haley MD;  Location: [facility] OR 2ND FLR;  Service: Colon and Rectal;  Laterality: N/A;    TRANSURETHRAL RESECTION OF PROSTATE (TURP) WITHOUT USE OF LASER N/A 1/23/2019    Procedure: TURP, WITHOUT USING LASER BIPOLAR;  Surgeon: Catarino Mota MD;  Location: [facility] OR 1ST FLR;  Service: Urology;  Laterality: N/A;  1.5 HOURS    TREATMENT OF CARDIAC ARRHYTHMIA  4/30/2024    Procedure: Cardioversion or Defibrillation;  Surgeon: Franky Taylor MD;  Location: [facility] EP LAB;  Service: Cardiology;;     Social History     Tobacco Use    Smoking status: Never     Passive exposure: Never    Smokeless tobacco: Never   Substance Use Topics    Alcohol use: No    Drug use: No     Family History   Problem Relation Name Age of Onset     Dementia Mother      Cancer Father      Diabetes Father      Heart disease Father      Melanoma Neg Hx      Hypertension Neg Hx      Arthritis Neg Hx       Allergy:  Review of patient's allergies indicates:   Allergen Reactions    Atorvastatin     Rosuvastatin      Outpatient Encounter Medications as of 2/21/2025   Medication Sig Dispense Refill    ACCU-CHEK PAUL CONTROL SOLN Soln 1 drop by NOT APPLICABLE route once daily.      ACCU-CHEK SOFTCLIX LANCETS [facility] TEST BLOOD SUGAR THREE TIMES DAILY 300 each 3    alcohol swabs (DROPSAFE ALCOHOL PREP PADS) PadM Apply 1 each topically once daily. 500 each 3    allopurinoL (ZYLOPRIM) 100 MG tablet Take 4 tablets (400 mg total) by mouth once daily. (Patient taking differently: Take 150 mg by mouth every other day.) 360 tablet 4    amiodarone (PACERONE) 200 MG Tab Take 1 tablet (200 mg total) by mouth once daily. 30 tablet 0    antiox.mv no.10/omeg3s/lut/gilma (I-CAPS ORAL) Take 1 capsule by mouth once daily.      apixaban (ELIQUIS) 5 mg Tab Take 1 tablet (5 mg total) by mouth 2 (two) times daily. 180 tablet 3    blood sugar diagnostic (ACCU-CHEK GUIDE TEST STRIPS) Strp 1 each by Other route 3 (three) times daily. Test Blood Sugar 300 strip 10    blood-glucose meter (ACCU-CHEK GUIDE GLUCOSE METER) [facility] Accucheck BID 1 each 0    brimonidine 0.2% (ALPHAGAN) 0.2 % Drop Place 1 drop into both eyes 2 (two) times a day. 60 mL 3    colchicine (COLCRYS) 0.6 mg tablet Take 1 tablet (0.6 mg total) by mouth every other day. 45 tablet 3    docusate sodium 100 mg capsule Take 100 mg by mouth 2 (two) times daily as needed for Constipation.      ferrous sulfate (IRON) 325 mg (65 mg iron) Tab tablet Take 1 tablet (325 mg total) by mouth every other day. 45 tablet 3    glimepiride (AMARYL) 4 MG tablet TAKE 1 TABLET TWICE DAILY BEFORE MEALS 180 tablet 3    hydrOXYzine HCL (ATARAX) 25 MG tablet Take 1 tablet (25 mg total) by mouth 3 (three) times daily as needed for Itching. 30 tablet 0     metoprolol succinate (TOPROL-XL) 25 MG 24 hr tablet Take 1 tablet (25 mg total) by mouth once daily. 90 tablet 3    midodrine (PROAMATINE) 2.5 MG Tab Take 1 tablet (2.5 mg total) by mouth 2 (two) times daily with meals. 60 tablet 1    pantoprazole (PROTONIX) 40 MG tablet TAKE 1 TABLET ONE TIME DAILY 90 tablet 3    pravastatin (PRAVACHOL) 40 MG tablet Take 1 tablet (40 mg total) by mouth once daily. 90 tablet 3    predniSONE (DELTASONE) 2.5 MG tablet Take 2.5 mg by mouth every other day.      sodium bicarbonate 650 MG tablet Take 2 tablets (1,300 mg total) by mouth 2 (two) times daily. 360 tablet 3    tamsulosin (FLOMAX) 0.4 mg Cap Take 1 capsule (0.4 mg total) by mouth once daily. 90 capsule 3    torsemide (DEMADEX) 20 MG Tab Take 1 tablet (20 mg total) by mouth once daily. 30 tablet 0    [DISCONTINUED] amiodarone (PACERONE) 200 MG Tab Take 1 tablet (200 mg total) by mouth 2 (two) times daily. 180 tablet 3    [DISCONTINUED] aspirin (ECOTRIN) 81 MG EC tablet Take 1 tablet (81 mg total) by mouth once daily. (Patient not taking: Reported on 2/3/2025) 30 tablet 11    [DISCONTINUED] furosemide (LASIX) 40 MG tablet Hold over the weekend and starting on Monday, February 3rd start taking one tablet by mouth every other day. 30 tablet 1    [DISCONTINUED] hydrOXYzine HCL (ATARAX) 25 MG tablet Take 1 tablet (25 mg total) by mouth 3 (three) times daily as needed for Itching. 30 tablet 0     Facility-Administered Encounter Medications as of 2/21/2025   Medication Dose Route Frequency Provider Last Rate Last Admin    [COMPLETED] iron sucrose injection 100 mg  100 mg Intravenous 1 time in Clinic/HOD Neftaly Isabel MD   100 mg at 02/21/25 0842    [COMPLETED] lactated ringers bolus 250 mL  250 mL Intravenous Once Cristela Garcia DO   Stopped at 02/08/25 0315    leuprolide acetate (6 month) injection 45 mg  45 mg Intramuscular Q6 Months    45 mg at 02/21/25 0955    sodium chloride 0.9% in 1,000 mL infusion   Intravenous  Continuous Order, Paper        [DISCONTINUED] 0.9% NaCl 250 mL flush bag   Intravenous PRN Neftaly Isabel MD        [DISCONTINUED] acetaminophen tablet 650 mg  650 mg Oral Q6H PRN Nely Recio MD   650 mg at 02/12/25 0605    [DISCONTINUED] allopurinol split tablet 150 mg  150 mg Oral Daily Keesha Harris PA-BRANDON   150 mg at 02/12/25 0814    [DISCONTINUED] amiodarone tablet 200 mg  200 mg Oral BID Jamar Medrano MD   200 mg at 02/11/25 0805    [DISCONTINUED] amiodarone tablet 200 mg  200 mg Oral Daily Nik Lacy MD   200 mg at 02/12/25 0814    [DISCONTINUED] apixaban tablet 5 mg  5 mg Oral BID Jamar Medrano MD   5 mg at 02/08/25 0814    [DISCONTINUED] apixaban tablet 5 mg  5 mg Oral BID Nely Recio MD   5 mg at 02/12/25 0814    [DISCONTINUED] aspirin EC tablet 81 mg  81 mg Oral Daily Jamar Medrano MD   81 mg at 02/12/25 0814    [DISCONTINUED] brimonidine 0.2% ophthalmic solution 1 drop  1 drop Both Eyes BID Jamar Medrano MD   1 drop at 02/12/25 0815    [DISCONTINUED] colchicine capsule/tablet 0.6 mg  0.6 mg Oral Every other day Jamar Medrano MD   0.6 mg at 02/12/25 0814    [DISCONTINUED] dextrose 50% injection 12.5 g  12.5 g Intravenous PRN Jamar Medrano MD        [DISCONTINUED] dextrose 50% injection 25 g  25 g Intravenous PRN Jamar Medrano MD        [DISCONTINUED] docusate sodium capsule 100 mg  100 mg Oral BID PRN Jamar Medrano MD        [DISCONTINUED] famotidine tablet 20 mg  20 mg Oral Every other day Jamar Medrano MD   20 mg at 02/12/25 0814    [DISCONTINUED] furosemide (Lasix) 500 mg in 50 mL infusion (conc: 10 mg/mL)  10 mg/hr Intravenous Continuous Nik Lacy MD   Stopped at 02/11/25 1049    [DISCONTINUED] furosemide tablet 40 mg  40 mg Oral Daily Fidelia Lujan PA-C   40 mg at 02/08/25 0814    [DISCONTINUED] glucagon (human recombinant) injection 1 mg  1 mg Intramuscular PRN Jamar Medrano MD        [DISCONTINUED]  glucose chewable tablet 16 g  16 g Oral PRN Jamar Medrano MD        [DISCONTINUED] glucose chewable tablet 24 g  24 g Oral PRN Jamar Medraon MD   24 g at 02/05/25 0543    [DISCONTINUED] heparin 25,000 units in dextrose 5% (100 units/ml) IV bolus from bag LOW INTENSITY nomogram - [facility]  60 Units/kg Intravenous PRN Nely Recio MD        [DISCONTINUED] heparin 25,000 units in dextrose 5% (100 units/ml) IV bolus from bag LOW INTENSITY nomogram - [facility]  30 Units/kg Intravenous PRN Nely Recio MD        [DISCONTINUED] heparin 25,000 units in dextrose 5% 250 mL (100 units/mL) infusion LOW INTENSITY nomogram - [facility]  0-40 Units/kg/hr Intravenous Continuous Nely Recio MD   Stopped at 02/09/25 1106    [DISCONTINUED] hydrOXYzine HCL tablet 25 mg  25 mg Oral TID PRN Gerber Payne MD   25 mg at 02/12/25 0238    [DISCONTINUED] insulin aspart U-100 pen 0-5 Units  0-5 Units Subcutaneous QID (AC + HS) PRN Jaamr Medrano MD   4 Units at 02/11/25 1331    [DISCONTINUED] metoprolol succinate (TOPROL-XL) 24 hr tablet 25 mg  25 mg Oral Daily Fidelia Lujan PA-C   25 mg at 02/08/25 0814    [DISCONTINUED] midodrine tablet 10 mg  10 mg Oral TID  Jamar Medrano MD   10 mg at 02/09/25 1648    [DISCONTINUED] midodrine tablet 5 mg  5 mg Oral TID  Nik Lacy MD   5 mg at 02/12/25 1600    [DISCONTINUED] mupirocin 2 % ointment   Nasal BID Russell Gamboa MD   Given at 02/12/25 0814    [DISCONTINUED] ondansetron tablet 4 mg  4 mg Oral Q8H PRN Jamar Medrano MD   4 mg at 02/08/25 1451    [DISCONTINUED] predniSONE tablet 40 mg  40 mg Oral Daily Nik Lacy MD   40 mg at 02/12/25 0814    [DISCONTINUED] sodium bicarbonate tablet 1,300 mg  1,300 mg Oral BID Jamar Medrano MD   1,300 mg at 02/12/25 0814    [DISCONTINUED] sodium chloride 0.9% flush 10 mL  10 mL Intravenous PRN Neftaly Isabel MD        [DISCONTINUED] tamsulosin 24 hr capsule 0.4 mg  0.4 mg Oral  Daily Fidelia Lujan PA-C   0.4 mg at 02/12/25 0814    [DISCONTINUED] torsemide tablet 20 mg  20 mg Oral BID loop Russell Gamboa MD   20 mg at 02/12/25 1600     Review of Systems   ROS  Physical Exam     Vitals:    02/21/25 0937   BP: 104/64   Pulse: 71         Physical Exam  Genitalia:  Scrotum: no rash or lesion  Normal symmetric epididymis without masses  Normal vas palpated  Normal size, symmetric testicles with no masses   Normal urethral meatus with no discharge  Normal circumcised penis with no lesion   Rectal:  Normal perineum and anus upon inspection.  Normal tone, no masses or tenderness;     LABS:  Lab Results   Component Value Date    PSA 1.2 10/19/2024    PSA 0.18 11/19/2020    PSA 7.9 (H) 04/20/2020    PSA 2.1 09/15/2014    PSA 2.16 05/13/2013    PSA 1.2 03/19/2012    PSA 1.0 02/19/2010    PSA 1.4 02/17/2009    PSA 1.1 04/30/2007    PSA 1.0 05/12/2006    PSADIAG 1.1 01/31/2025    PSADIAG 1.1 09/01/2024    PSADIAG 2.4 05/09/2024    PSADIAG 1.5 01/10/2024    PSADIAG 0.69 09/08/2023    PSADIAG 0.08 11/22/2022    PSADIAG 0.03 03/04/2022    PSADIAG 0.03 12/16/2021    PSADIAG 0.02 10/29/2021    PSADIAG 0.02 09/14/2021     Results for orders placed or performed in visit on 01/31/25   Prostate Specific Antigen, Diagnostic    Collection Time: 01/31/25  9:25 AM   Result Value Ref Range    PSA Diagnostic 1.1 0.00 - 4.00 ng/mL   Results for orders placed or performed in visit on 09/01/24   PROSTATE SPECIFIC ANTIGEN, DIAGNOSTIC    Collection Time: 09/01/24  7:45 AM   Result Value Ref Range    PSA Diagnostic 1.1 0.00 - 4.00 ng/mL   Results for orders placed or performed in visit on 05/09/24   PROSTATE SPECIFIC ANTIGEN, DIAGNOSTIC    Collection Time: 05/09/24  8:54 AM   Result Value Ref Range    PSA Diagnostic 2.4 0.00 - 4.00 ng/mL     Lab Results   Component Value Date    CREATININE 2.7 (H) 02/12/2025    CREATININE 2.6 (H) 02/11/2025    CREATININE 2.5 (H) 02/10/2025     Results for orders placed or  performed in visit on 10/26/23   Testosterone    Collection Time: 10/26/23 11:15 AM   Result Value Ref Range    Testosterone, Total 29 (L) 304 - 1227 ng/dL     *Note: Due to a large number of results and/or encounters for the requested time period, some results have not been displayed. A complete set of results can be found in Results Review.     Urine Culture, Routine   Date Value Ref Range Status   09/19/2024 (A)  Final    ACINETOBACTER BAUMANNII/HAEMOLYTICUS  >100,000 cfu/ml  No other significant isolate       Hemoglobin A1C   Date Value Ref Range Status   12/27/2024 6.5 (H) 4.0 - 5.6 % Final     Comment:     ADA Screening Guidelines:  5.7-6.4%  Consistent with prediabetes  >or=6.5%  Consistent with diabetes    High levels of fetal hemoglobin interfere with the HbA1C  assay. Heterozygous hemoglobin variants (HbS, HgC, etc)do  not significantly interfere with this assay.   However, presence of multiple variants may affect accuracy.     08/04/2024 6.8 (H) 4.0 - 5.6 % Final     Comment:     ADA Screening Guidelines:  5.7-6.4%  Consistent with prediabetes  >or=6.5%  Consistent with diabetes    High levels of fetal hemoglobin interfere with the HbA1C  assay. Heterozygous hemoglobin variants (HbS, HgC, etc)do  not significantly interfere with this assay.   However, presence of multiple variants may affect accuracy.         Radiology:  MRI of prostate 8/20/19  Previous biopsy: Negative on 01/23/2019  PSA: 6.3 ng/mL (08/16/2019)  Prior therapy: TURP  Prostate: 3.6 x 3.5 x 3.0 cm corresponding to a computed volume of 15.2 cc.  Peripheral zone: Focal abnormalities with imaging features concerning for prostate cancer.  Lesion (MARYCARMEN) #1  Location: Side: right; Region: base; Zone: posterior peripheral zone laterally  Greatest dimension: 1.0 cm  T2-WI: T2 SI: circumscribed, homogeneous moderate hypointensity--score 4 or 5  DWI/ADC: DWI: Focal markedly hypointense on ADC and markedly hyperintense on high b-value DWI--score 4 or  "5  DCE: Positive  Extraprostatic extension: No gross extraprostatic extension noting postsurgical changes limits evaluation.  PI-RADS assessment category: 4    Transitional zone: Mostly surgically resected.  Neurovascular bundle: Normal.  Seminal vesicles:  SV invasion: None  Adjacent Organ Involvement: No focal bladder wall thickening.  No rectal involvement.  Lymphadenopathy: None  Assessment and Plan:  Diagnoses and all orders for this visit:    Benign prostatic hyperplasia without lower urinary tract symptoms    History of BPH    Elevated PSA    S/P TURP    History of elevated PSA          hx of "suspected prostate cancer" with abnormal MRI of prostate with rising PSA up to 7.9 in 4/20/20.  Never proved that he has a prostate cancer with tissue diagnosis despite TURP.  Unable to perform prostate biopsy because hs anus is closed off.    Last PSA on file 10/19/2024- 1.2  Last Lupron injection given in 2/21/2025.       Follow-up:  Patient will see Dr Mota on 3/27/2025      "

## 2025-02-25 ENCOUNTER — HOSPITAL ENCOUNTER (INPATIENT)
Facility: HOSPITAL | Age: 75
LOS: 6 days | Discharge: HOME HEALTH | DRG: 193 | End: 2025-03-04
Attending: STUDENT IN AN ORGANIZED HEALTH CARE EDUCATION/TRAINING PROGRAM | Admitting: HOSPITALIST
Payer: MEDICARE

## 2025-02-25 ENCOUNTER — OFFICE VISIT (OUTPATIENT)
Dept: NEPHROLOGY | Facility: CLINIC | Age: 75
End: 2025-02-25
Payer: MEDICARE

## 2025-02-25 ENCOUNTER — INFUSION (OUTPATIENT)
Dept: INFECTIOUS DISEASES | Facility: HOSPITAL | Age: 75
End: 2025-02-25
Payer: MEDICARE

## 2025-02-25 ENCOUNTER — TELEPHONE (OUTPATIENT)
Dept: INTERNAL MEDICINE | Facility: CLINIC | Age: 75
End: 2025-02-25
Payer: MEDICARE

## 2025-02-25 ENCOUNTER — OFFICE VISIT (OUTPATIENT)
Dept: INTERNAL MEDICINE | Facility: CLINIC | Age: 75
End: 2025-02-25
Payer: MEDICARE

## 2025-02-25 ENCOUNTER — LAB VISIT (OUTPATIENT)
Dept: LAB | Facility: HOSPITAL | Age: 75
End: 2025-02-25
Attending: INTERNAL MEDICINE
Payer: MEDICARE

## 2025-02-25 VITALS
OXYGEN SATURATION: 99 % | HEIGHT: 74 IN | SYSTOLIC BLOOD PRESSURE: 83 MMHG | DIASTOLIC BLOOD PRESSURE: 50 MMHG | RESPIRATION RATE: 18 BRPM | BODY MASS INDEX: 18.48 KG/M2 | WEIGHT: 144 LBS

## 2025-02-25 VITALS
WEIGHT: 149.06 LBS | BODY MASS INDEX: 19.13 KG/M2 | SYSTOLIC BLOOD PRESSURE: 91 MMHG | DIASTOLIC BLOOD PRESSURE: 52 MMHG | HEART RATE: 73 BPM | HEIGHT: 74 IN | RESPIRATION RATE: 20 BRPM | TEMPERATURE: 98 F | OXYGEN SATURATION: 98 %

## 2025-02-25 VITALS — WEIGHT: 151 LBS | DIASTOLIC BLOOD PRESSURE: 48 MMHG | BODY MASS INDEX: 19.39 KG/M2 | SYSTOLIC BLOOD PRESSURE: 82 MMHG

## 2025-02-25 DIAGNOSIS — N18.4 STAGE 4 CHRONIC KIDNEY DISEASE: ICD-10-CM

## 2025-02-25 DIAGNOSIS — E11.69 TYPE 2 DIABETES MELLITUS WITH OTHER SPECIFIED COMPLICATION, WITHOUT LONG-TERM CURRENT USE OF INSULIN: ICD-10-CM

## 2025-02-25 DIAGNOSIS — I95.9 HYPOTENSION, UNSPECIFIED HYPOTENSION TYPE: Primary | ICD-10-CM

## 2025-02-25 DIAGNOSIS — Z87.19 STATUS POST REPAIR OF VENTRAL HERNIA: ICD-10-CM

## 2025-02-25 DIAGNOSIS — N18.30 TYPE 2 DIABETES MELLITUS WITH STAGE 3 CHRONIC KIDNEY DISEASE, WITHOUT LONG-TERM CURRENT USE OF INSULIN, UNSPECIFIED WHETHER STAGE 3A OR 3B CKD: ICD-10-CM

## 2025-02-25 DIAGNOSIS — R07.9 CHEST PAIN: ICD-10-CM

## 2025-02-25 DIAGNOSIS — E11.22 TYPE 2 DIABETES MELLITUS WITH STAGE 3 CHRONIC KIDNEY DISEASE, WITHOUT LONG-TERM CURRENT USE OF INSULIN, UNSPECIFIED WHETHER STAGE 3A OR 3B CKD: ICD-10-CM

## 2025-02-25 DIAGNOSIS — N17.9 AKI (ACUTE KIDNEY INJURY): ICD-10-CM

## 2025-02-25 DIAGNOSIS — E87.20 METABOLIC ACIDOSIS WITH NORMAL ANION GAP AND BICARBONATE LOSSES: Primary | ICD-10-CM

## 2025-02-25 DIAGNOSIS — N18.4 STAGE 4 CHRONIC KIDNEY DISEASE: Primary | ICD-10-CM

## 2025-02-25 DIAGNOSIS — N25.81 SECONDARY HYPERPARATHYROIDISM: ICD-10-CM

## 2025-02-25 DIAGNOSIS — E87.20 METABOLIC ACIDOSIS WITH NORMAL ANION GAP AND BICARBONATE LOSSES: ICD-10-CM

## 2025-02-25 DIAGNOSIS — Z98.890 STATUS POST REPAIR OF VENTRAL HERNIA: ICD-10-CM

## 2025-02-25 DIAGNOSIS — I95.9 HYPOTENSION: ICD-10-CM

## 2025-02-25 DIAGNOSIS — R80.9 PROTEINURIA, UNSPECIFIED TYPE: ICD-10-CM

## 2025-02-25 DIAGNOSIS — J34.89 RHINORRHEA: ICD-10-CM

## 2025-02-25 DIAGNOSIS — N18.4 CHRONIC RENAL IMPAIRMENT, STAGE 4 (SEVERE): ICD-10-CM

## 2025-02-25 DIAGNOSIS — I50.20 HFREF (HEART FAILURE WITH REDUCED EJECTION FRACTION): ICD-10-CM

## 2025-02-25 DIAGNOSIS — I50.43 ACUTE ON CHRONIC COMBINED SYSTOLIC AND DIASTOLIC HEART FAILURE: ICD-10-CM

## 2025-02-25 DIAGNOSIS — Z13.6 SCREENING FOR CARDIOVASCULAR CONDITION: ICD-10-CM

## 2025-02-25 DIAGNOSIS — K76.0 HEPATIC STEATOSIS: Primary | ICD-10-CM
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
## 2025-02-25 DIAGNOSIS — D50.9 IRON DEFICIENCY ANEMIA, UNSPECIFIED IRON DEFICIENCY ANEMIA TYPE: ICD-10-CM

## 2025-02-25 DIAGNOSIS — N17.9 ACUTE KIDNEY INJURY: ICD-10-CM

## 2025-02-25 PROBLEM — A41.9 SEPSIS: Status: ACTIVE | Noted: 2025-02-25

## 2025-02-25 PROBLEM — N39.0 UTI (URINARY TRACT INFECTION): Status: ACTIVE | Noted: 2025-02-25

## 2025-02-25 LAB
ALBUMIN SERPL BCP-MCNC: 3.6 G/DL (ref 3.5–5.2)
ALBUMIN SERPL BCP-MCNC: 3.6 G/DL (ref 3.5–5.2)
ALP SERPL-CCNC: 146 U/L (ref 40–150)
ALT SERPL W/O P-5'-P-CCNC: 73 U/L (ref 10–44)
ANION GAP SERPL CALC-SCNC: 16 MMOL/L (ref 8–16)
ANION GAP SERPL CALC-SCNC: 18 MMOL/L (ref 8–16)
AST SERPL-CCNC: 83 U/L (ref 10–40)
BASOPHILS # BLD AUTO: 0.01 K/UL (ref 0–0.2)
BASOPHILS # BLD AUTO: 0.03 K/UL (ref 0–0.2)
BASOPHILS NFR BLD: 0.3 % (ref 0–1.9)
BASOPHILS NFR BLD: 0.7 % (ref 0–1.9)
BILIRUB SERPL-MCNC: 0.7 MG/DL (ref 0.1–1)
BILIRUB UR QL STRIP: NEGATIVE
BIPAP: 0
BNP SERPL-MCNC: 300 PG/ML (ref 0–99)
BUN SERPL-MCNC: 93 MG/DL (ref 8–23)
BUN SERPL-MCNC: 95 MG/DL (ref 8–23)
CALCIUM SERPL-MCNC: 8.5 MG/DL (ref 8.7–10.5)
CALCIUM SERPL-MCNC: 8.6 MG/DL (ref 8.7–10.5)
CHLORIDE SERPL-SCNC: 98 MMOL/L (ref 95–110)
CHLORIDE SERPL-SCNC: 98 MMOL/L (ref 95–110)
CLARITY UR REFRACT.AUTO: CLEAR
CO2 SERPL-SCNC: 19 MMOL/L (ref 23–29)
CO2 SERPL-SCNC: 23 MMOL/L (ref 23–29)
COLOR UR AUTO: YELLOW
CORRECTED TEMPERATURE (PCO2): 44.6 MMHG
CORRECTED TEMPERATURE (PH): 7.38
CORRECTED TEMPERATURE (PO2): 16.1 MMHG
CREAT SERPL-MCNC: 5.3 MG/DL (ref 0.5–1.4)
CREAT SERPL-MCNC: 5.4 MG/DL (ref 0.5–1.4)
DIFFERENTIAL METHOD BLD: ABNORMAL
DIFFERENTIAL METHOD BLD: ABNORMAL
EOSINOPHIL # BLD AUTO: 0 K/UL (ref 0–0.5)
EOSINOPHIL # BLD AUTO: 0 K/UL (ref 0–0.5)
EOSINOPHIL NFR BLD: 0.2 % (ref 0–8)
EOSINOPHIL NFR BLD: 0.3 % (ref 0–8)
ERYTHROCYTE [DISTWIDTH] IN BLOOD BY AUTOMATED COUNT: 19.5 % (ref 11.5–14.5)
ERYTHROCYTE [DISTWIDTH] IN BLOOD BY AUTOMATED COUNT: 19.5 % (ref 11.5–14.5)
EST. GFR  (NO RACE VARIABLE): 10.4 ML/MIN/1.73 M^2
EST. GFR  (NO RACE VARIABLE): 10.6 ML/MIN/1.73 M^2
ESTIMATED AVG GLUCOSE: 143 MG/DL (ref 68–131)
FIO2: 21 %
GLUCOSE SERPL-MCNC: 101 MG/DL (ref 70–110)
GLUCOSE SERPL-MCNC: 107 MG/DL (ref 70–110)
GLUCOSE UR QL STRIP: NEGATIVE
HBA1C MFR BLD: 6.6 % (ref 4–5.6)
HCT VFR BLD AUTO: 36.1 % (ref 40–54)
HCT VFR BLD AUTO: 37.3 % (ref 40–54)
HCT VFR BLD CALC: 37.2 %
HGB BLD-MCNC: 11.5 G/DL (ref 14–18)
HGB BLD-MCNC: 11.5 G/DL (ref 14–18)
HGB UR QL STRIP: NEGATIVE
IMM GRANULOCYTES # BLD AUTO: 0.02 K/UL (ref 0–0.04)
IMM GRANULOCYTES # BLD AUTO: 0.03 K/UL (ref 0–0.04)
IMM GRANULOCYTES NFR BLD AUTO: 0.6 % (ref 0–0.5)
IMM GRANULOCYTES NFR BLD AUTO: 0.7 % (ref 0–0.5)
INFLUENZA A, MOLECULAR: NORMAL
INFLUENZA B, MOLECULAR: NORMAL
KETONES UR QL STRIP: NEGATIVE
LDH SERPL L TO P-CCNC: 1.5 MMOL/L (ref 0.5–2.2)
LEUKOCYTE ESTERASE UR QL STRIP: NEGATIVE
LYMPHOCYTES # BLD AUTO: 0.6 K/UL (ref 1–4.8)
LYMPHOCYTES # BLD AUTO: 0.7 K/UL (ref 1–4.8)
LYMPHOCYTES NFR BLD: 16 % (ref 18–48)
LYMPHOCYTES NFR BLD: 20.4 % (ref 18–48)
MCH RBC QN AUTO: 27.6 PG (ref 27–31)
MCH RBC QN AUTO: 28.1 PG (ref 27–31)
MCHC RBC AUTO-ENTMCNC: 30.8 G/DL (ref 32–36)
MCHC RBC AUTO-ENTMCNC: 31.9 G/DL (ref 32–36)
MCV RBC AUTO: 88 FL (ref 82–98)
MCV RBC AUTO: 89 FL (ref 82–98)
MONOCYTES # BLD AUTO: 0.3 K/UL (ref 0.3–1)
MONOCYTES # BLD AUTO: 0.5 K/UL (ref 0.3–1)
MONOCYTES NFR BLD: 10.9 % (ref 4–15)
MONOCYTES NFR BLD: 10.9 % (ref 4–15)
NEUTROPHILS # BLD AUTO: 2.1 K/UL (ref 1.8–7.7)
NEUTROPHILS # BLD AUTO: 3.1 K/UL (ref 1.8–7.7)
NEUTROPHILS NFR BLD: 67.5 % (ref 38–73)
NEUTROPHILS NFR BLD: 71.5 % (ref 38–73)
NITRITE UR QL STRIP: NEGATIVE
NRBC BLD-RTO: 0 /100 WBC
NRBC BLD-RTO: 0 /100 WBC
PCO2 BLDA: 44.6 MMHG
PH SMN: 7.38 [PH]
PH UR STRIP: 5 [PH] (ref 5–8)
PHOSPHATE SERPL-MCNC: 5.5 MG/DL (ref 2.7–4.5)
PLATELET # BLD AUTO: 150 K/UL (ref 150–450)
PLATELET # BLD AUTO: 188 K/UL (ref 150–450)
PMV BLD AUTO: 10.7 FL (ref 9.2–12.9)
PMV BLD AUTO: 11.1 FL (ref 9.2–12.9)
PO2 BLDA: 16.1 MMHG
POC BASE DEFICIT: 0.7 MMOL/L
POC HCO3: 23.4 MMOL/L
POC PERFORMED BY: NORMAL
POC TEMPERATURE: 37 C
POCT GLUCOSE: 96 MG/DL (ref 70–110)
POCT GLUCOSE: 96 MG/DL (ref 70–110)
POTASSIUM SERPL-SCNC: 4 MMOL/L (ref 3.5–5.1)
POTASSIUM SERPL-SCNC: 4.1 MMOL/L (ref 3.5–5.1)
PROT SERPL-MCNC: 6.7 G/DL (ref 6–8.4)
PROT UR QL STRIP: NEGATIVE
RBC # BLD AUTO: 4.09 M/UL (ref 4.6–6.2)
RBC # BLD AUTO: 4.17 M/UL (ref 4.6–6.2)
SARS-COV-2 RDRP RESP QL NAA+PROBE: NEGATIVE
SODIUM SERPL-SCNC: 135 MMOL/L (ref 136–145)
SODIUM SERPL-SCNC: 137 MMOL/L (ref 136–145)
SP GR UR STRIP: 1.01 (ref 1–1.03)
SPECIMEN SOURCE: NORMAL
SPECIMEN SOURCE: NORMAL
TROPONIN I SERPL DL<=0.01 NG/ML-MCNC: 30 NG/L (ref 0–35)
TROPONIN I SERPL DL<=0.01 NG/ML-MCNC: 43 NG/L (ref 0–35)
URN SPEC COLLECT METH UR: NORMAL
WBC # BLD AUTO: 3.13 K/UL (ref 3.9–12.7)
WBC # BLD AUTO: 4.32 K/UL (ref 3.9–12.7)

## 2025-02-25 PROCEDURE — 82962 GLUCOSE BLOOD TEST: CPT | Mod: HCNC

## 2025-02-25 PROCEDURE — 84484 ASSAY OF TROPONIN QUANT: CPT | Mod: 91,HCNC

## 2025-02-25 PROCEDURE — 96365 THER/PROPH/DIAG IV INF INIT: CPT | Mod: HCNC

## 2025-02-25 PROCEDURE — 83880 ASSAY OF NATRIURETIC PEPTIDE: CPT | Mod: HCNC | Performed by: STUDENT IN AN ORGANIZED HEALTH CARE EDUCATION/TRAINING PROGRAM

## 2025-02-25 PROCEDURE — 99285 EMERGENCY DEPT VISIT HI MDM: CPT | Mod: 25,27,HCNC

## 2025-02-25 PROCEDURE — 85025 COMPLETE CBC W/AUTO DIFF WBC: CPT | Mod: HCNC | Performed by: STUDENT IN AN ORGANIZED HEALTH CARE EDUCATION/TRAINING PROGRAM

## 2025-02-25 PROCEDURE — 3078F DIAST BP <80 MM HG: CPT | Mod: HCNC,CPTII,S$GLB, | Performed by: FAMILY MEDICINE

## 2025-02-25 PROCEDURE — 93005 ELECTROCARDIOGRAM TRACING: CPT | Mod: HCNC

## 2025-02-25 PROCEDURE — 99900035 HC TECH TIME PER 15 MIN (STAT): Mod: HCNC

## 2025-02-25 PROCEDURE — 99999 PR PBB SHADOW E&M-EST. PATIENT-LVL III: CPT | Mod: PBBFAC,HCNC,, | Performed by: FAMILY MEDICINE

## 2025-02-25 PROCEDURE — G0378 HOSPITAL OBSERVATION PER HR: HCPCS | Mod: HCNC

## 2025-02-25 PROCEDURE — 36415 COLL VENOUS BLD VENIPUNCTURE: CPT | Mod: HCNC | Performed by: FAMILY MEDICINE

## 2025-02-25 PROCEDURE — 81003 URINALYSIS AUTO W/O SCOPE: CPT | Mod: HCNC | Performed by: STUDENT IN AN ORGANIZED HEALTH CARE EDUCATION/TRAINING PROGRAM

## 2025-02-25 PROCEDURE — 80069 RENAL FUNCTION PANEL: CPT | Mod: HCNC | Performed by: INTERNAL MEDICINE

## 2025-02-25 PROCEDURE — 87086 URINE CULTURE/COLONY COUNT: CPT | Mod: HCNC | Performed by: STUDENT IN AN ORGANIZED HEALTH CARE EDUCATION/TRAINING PROGRAM

## 2025-02-25 PROCEDURE — 87040 BLOOD CULTURE FOR BACTERIA: CPT | Mod: 59,HCNC | Performed by: STUDENT IN AN ORGANIZED HEALTH CARE EDUCATION/TRAINING PROGRAM

## 2025-02-25 PROCEDURE — 93010 ELECTROCARDIOGRAM REPORT: CPT | Mod: HCNC,,, | Performed by: INTERNAL MEDICINE

## 2025-02-25 PROCEDURE — 63600175 PHARM REV CODE 636 W HCPCS: Mod: HCNC

## 2025-02-25 PROCEDURE — 83036 HEMOGLOBIN GLYCOSYLATED A1C: CPT | Mod: HCNC | Performed by: FAMILY MEDICINE

## 2025-02-25 PROCEDURE — 96367 TX/PROPH/DG ADDL SEQ IV INF: CPT | Mod: HCNC

## 2025-02-25 PROCEDURE — 3074F SYST BP LT 130 MM HG: CPT | Mod: HCNC,CPTII,S$GLB, | Performed by: FAMILY MEDICINE

## 2025-02-25 PROCEDURE — 1111F DSCHRG MED/CURRENT MED MERGE: CPT | Mod: HCNC,CPTII,S$GLB, | Performed by: FAMILY MEDICINE

## 2025-02-25 PROCEDURE — 87635 SARS-COV-2 COVID-19 AMP PRB: CPT | Mod: HCNC

## 2025-02-25 PROCEDURE — 99999 PR PBB SHADOW E&M-EST. PATIENT-LVL IV: CPT | Mod: PBBFAC,HCNC,, | Performed by: INTERNAL MEDICINE

## 2025-02-25 PROCEDURE — 87502 INFLUENZA DNA AMP PROBE: CPT | Mod: HCNC

## 2025-02-25 PROCEDURE — G2211 COMPLEX E/M VISIT ADD ON: HCPCS | Mod: HCNC,S$GLB,, | Performed by: FAMILY MEDICINE

## 2025-02-25 PROCEDURE — 83605 ASSAY OF LACTIC ACID: CPT | Mod: HCNC

## 2025-02-25 PROCEDURE — 25000003 PHARM REV CODE 250: Mod: HCNC

## 2025-02-25 PROCEDURE — 96366 THER/PROPH/DIAG IV INF ADDON: CPT | Mod: HCNC

## 2025-02-25 PROCEDURE — 1160F RVW MEDS BY RX/DR IN RCRD: CPT | Mod: HCNC,CPTII,S$GLB, | Performed by: FAMILY MEDICINE

## 2025-02-25 PROCEDURE — 84484 ASSAY OF TROPONIN QUANT: CPT | Mod: HCNC | Performed by: STUDENT IN AN ORGANIZED HEALTH CARE EDUCATION/TRAINING PROGRAM

## 2025-02-25 PROCEDURE — 82803 BLOOD GASES ANY COMBINATION: CPT | Mod: HCNC

## 2025-02-25 PROCEDURE — 80053 COMPREHEN METABOLIC PANEL: CPT | Mod: HCNC | Performed by: STUDENT IN AN ORGANIZED HEALTH CARE EDUCATION/TRAINING PROGRAM

## 2025-02-25 PROCEDURE — 3288F FALL RISK ASSESSMENT DOCD: CPT | Mod: HCNC,CPTII,S$GLB, | Performed by: FAMILY MEDICINE

## 2025-02-25 PROCEDURE — 99211 OFF/OP EST MAY X REQ PHY/QHP: CPT | Mod: HCNC

## 2025-02-25 PROCEDURE — 85025 COMPLETE CBC W/AUTO DIFF WBC: CPT | Mod: 91,HCNC | Performed by: INTERNAL MEDICINE

## 2025-02-25 PROCEDURE — 1100F PTFALLS ASSESS-DOCD GE2>/YR: CPT | Mod: HCNC,CPTII,S$GLB, | Performed by: FAMILY MEDICINE

## 2025-02-25 PROCEDURE — 1159F MED LIST DOCD IN RCRD: CPT | Mod: HCNC,CPTII,S$GLB, | Performed by: FAMILY MEDICINE

## 2025-02-25 PROCEDURE — 99214 OFFICE O/P EST MOD 30 MIN: CPT | Mod: HCNC,S$GLB,, | Performed by: FAMILY MEDICINE

## 2025-02-25 RX ORDER — IBUPROFEN 200 MG
16 TABLET ORAL
Status: DISCONTINUED | OUTPATIENT
Start: 2025-02-25 | End: 2025-02-25

## 2025-02-25 RX ORDER — PRAVASTATIN SODIUM 40 MG/1
40 TABLET ORAL DAILY
Status: DISCONTINUED | OUTPATIENT
Start: 2025-02-26 | End: 2025-03-04 | Stop reason: HOSPADM

## 2025-02-25 RX ORDER — TAMSULOSIN HYDROCHLORIDE 0.4 MG/1
0.4 CAPSULE ORAL DAILY
Status: DISCONTINUED | OUTPATIENT
Start: 2025-02-26 | End: 2025-03-03

## 2025-02-25 RX ORDER — SODIUM CHLORIDE 0.9 % (FLUSH) 0.9 %
10 SYRINGE (ML) INJECTION
OUTPATIENT
Start: 2025-03-04

## 2025-02-25 RX ORDER — GLUCAGON 1 MG
1 KIT INJECTION
Status: DISCONTINUED | OUTPATIENT
Start: 2025-02-25 | End: 2025-03-04 | Stop reason: HOSPADM

## 2025-02-25 RX ORDER — IBUPROFEN 200 MG
24 TABLET ORAL
Status: DISCONTINUED | OUTPATIENT
Start: 2025-02-25 | End: 2025-02-25

## 2025-02-25 RX ORDER — HEPARIN 100 UNIT/ML
500 SYRINGE INTRAVENOUS
OUTPATIENT
Start: 2025-03-04

## 2025-02-25 RX ORDER — IBUPROFEN 200 MG
24 TABLET ORAL
Status: DISCONTINUED | OUTPATIENT
Start: 2025-02-25 | End: 2025-03-04 | Stop reason: HOSPADM

## 2025-02-25 RX ORDER — EPINEPHRINE 0.3 MG/.3ML
0.3 INJECTION SUBCUTANEOUS ONCE AS NEEDED
OUTPATIENT
Start: 2025-03-04

## 2025-02-25 RX ORDER — ALBUTEROL SULFATE 0.83 MG/ML
2.5 SOLUTION RESPIRATORY (INHALATION) EVERY 6 HOURS PRN
Qty: 60 EACH | Refills: 6 | Status: ON HOLD | OUTPATIENT
Start: 2025-02-25 | End: 2026-02-25

## 2025-02-25 RX ORDER — SODIUM CHLORIDE 0.9 % (FLUSH) 0.9 %
10 SYRINGE (ML) INJECTION EVERY 12 HOURS PRN
Status: DISCONTINUED | OUTPATIENT
Start: 2025-02-25 | End: 2025-03-04 | Stop reason: HOSPADM

## 2025-02-25 RX ORDER — IBUPROFEN 200 MG
16 TABLET ORAL
Status: DISCONTINUED | OUTPATIENT
Start: 2025-02-25 | End: 2025-03-04 | Stop reason: HOSPADM

## 2025-02-25 RX ORDER — DIPHENHYDRAMINE HYDROCHLORIDE 50 MG/ML
50 INJECTION INTRAMUSCULAR; INTRAVENOUS ONCE AS NEEDED
OUTPATIENT
Start: 2025-03-04

## 2025-02-25 RX ORDER — INSULIN ASPART 100 [IU]/ML
0-5 INJECTION, SOLUTION INTRAVENOUS; SUBCUTANEOUS
Status: DISCONTINUED | OUTPATIENT
Start: 2025-02-25 | End: 2025-03-03

## 2025-02-25 RX ORDER — AZELASTINE 1 MG/ML
1 SPRAY, METERED NASAL 2 TIMES DAILY
Qty: 30 ML | Refills: 1 | Status: ON HOLD | OUTPATIENT
Start: 2025-02-25 | End: 2026-02-25

## 2025-02-25 RX ORDER — NALOXONE HCL 0.4 MG/ML
0.02 VIAL (ML) INJECTION
Status: DISCONTINUED | OUTPATIENT
Start: 2025-02-25 | End: 2025-03-04 | Stop reason: HOSPADM

## 2025-02-25 RX ORDER — GLUCAGON 1 MG
1 KIT INJECTION
Status: DISCONTINUED | OUTPATIENT
Start: 2025-02-25 | End: 2025-02-25

## 2025-02-25 RX ORDER — VANCOMYCIN 1.75 GRAM/500 ML IN 0.9 % SODIUM CHLORIDE INTRAVENOUS
25 ONCE
Status: COMPLETED | OUTPATIENT
Start: 2025-02-25 | End: 2025-02-25

## 2025-02-25 RX ORDER — SODIUM CHLORIDE 0.9 % (FLUSH) 0.9 %
10 SYRINGE (ML) INJECTION
Status: DISCONTINUED | OUTPATIENT
Start: 2025-02-25 | End: 2025-02-25 | Stop reason: HOSPADM

## 2025-02-25 RX ADMIN — PIPERACILLIN SODIUM AND TAZOBACTAM SODIUM 4.5 G: 4; .5 INJECTION, POWDER, FOR SOLUTION INTRAVENOUS at 05:02

## 2025-02-25 RX ADMIN — APIXABAN 5 MG: 5 TABLET, FILM COATED ORAL at 09:02

## 2025-02-25 RX ADMIN — VANCOMYCIN HYDROCHLORIDE 1750 MG: 10 INJECTION, POWDER, LYOPHILIZED, FOR SOLUTION INTRAVENOUS at 05:02

## 2025-02-25 NOTE — TELEPHONE ENCOUNTER
----- Message from Laisha sent at 2/25/2025  9:33 AM CST -----  Regarding: Appt  Good Morning, Provider is requesting for the patient to be seen back in clinic anytime after April 7. Patient is requesting Tuesday Or Thursday morning only in the morning. Please contact the pt to help schedule an appt.Thanks.

## 2025-02-25 NOTE — PROGRESS NOTES
Subjective:     Patient ID: Jarrett Charlton Jr. is a 74 y.o. male.   Chief Complaint: Follow-up and Fall    HPI:  History of Present Illness    CHIEF COMPLAINT:  Patient presents today for general follow-up and sustained a fall in the waiting room prior to the appointment    RECENT HOSPITALIZATION:  He was hospitalized from 2/3/25 for 8 days due to multiple issues including worsening lower extremity swelling, abdominal pain, and decreased ostomy output. During hospitalization, he was found to have a new pancreatic lesion requiring outpatient ERCP (pending completion), acute on chronic heart failure exacerbation with severe pulmonary edema, newly depressed EF, acute kidney injury on chronic kidney disease, and gout flare requiring prednisone. He was monitored in cardiac intensive care due to hypotension which limited diuresis. He returned to baseline prior to discharge and denied dyspnea or chest pain at discharge.    HYPOTENSION:  He reports persistent low blood pressure with systolic readings not exceeding 80 mmHg, though he feels fine when systolic pressure reaches 90 mmHg or higher. He experiences symptoms including stumbling and falling due to hypotension. He reports difficulty walking and stumbled while entering the clinic today, resulting in a fall.    FALLS:  He reports two recent falls. Today's fall occurred when attempting to grab a wheelchair, with his foot becoming caught during the process. A previous fall occurred a couple weeks ago resulting in facial injury with persistent bruising to nose.    GASTROINTESTINAL:  He reports ongoing stomach cramping with pain and bloating when attempting to consume solid foods, leading to difficulty with oral intake. He is able to tolerate fluids including Boost, milk, and orange juice. He reports associated nausea and urge to vomit. An incidental pancreatic lesion was found on CT, with upcoming Gastroenterology evaluation scheduled for 3/20/2025.    RESPIRATORY:  He  reports allergy symptoms including runny nose.           Review of Systems   Constitutional:  Positive for activity change, appetite change and fatigue. Negative for chills and fever.   HENT:  Negative for nasal congestion, ear pain and sore throat.    Eyes:  Negative for visual disturbance.   Respiratory:  Negative for cough and shortness of breath.    Cardiovascular:  Negative for chest pain, palpitations and leg swelling.   Gastrointestinal:  Positive for abdominal pain. Negative for constipation, diarrhea, nausea and vomiting.   Genitourinary:  Negative for dysuria and frequency.   Integumentary:  Negative for rash.   Neurological:  Positive for dizziness, weakness and light-headedness. Negative for headaches.   Psychiatric/Behavioral:  Negative for suicidal ideas.    All other systems reviewed and are negative.         Objective:      Vitals:    02/25/25 0853   BP: (!) 82/48   BP Location: Left arm   Patient Position: Sitting   Weight: 68.5 kg (151 lb 0.2 oz)      Physical Exam  Constitutional:       General: He is not in acute distress.     Appearance: He is not ill-appearing.   HENT:      Head: Normocephalic and atraumatic.      Right Ear: External ear normal. No middle ear effusion.      Left Ear: External ear normal.  No middle ear effusion.      Nose: Nose normal. No congestion or rhinorrhea.      Mouth/Throat:      Mouth: Mucous membranes are moist.      Pharynx: Oropharynx is clear. No oropharyngeal exudate or posterior oropharyngeal erythema.   Eyes:      General: No scleral icterus.        Right eye: No discharge.         Left eye: No discharge.      Conjunctiva/sclera: Conjunctivae normal.   Cardiovascular:      Rate and Rhythm: Normal rate and regular rhythm.      Heart sounds: Normal heart sounds. No murmur heard.     No friction rub. No gallop.   Pulmonary:      Effort: No respiratory distress.      Breath sounds: Normal breath sounds. No wheezing, rhonchi or rales.   Abdominal:      General: Bowel  sounds are normal. There is no distension.      Palpations: Abdomen is soft. There is no mass.      Tenderness: There is no abdominal tenderness. There is no guarding.   Musculoskeletal:         General: No swelling or deformity. Normal range of motion.      Cervical back: Normal range of motion and neck supple.   Lymphadenopathy:      Cervical: No cervical adenopathy.   Skin:     General: Skin is warm and dry.             Comments: Superficial skin tear LLE in area on illustration; hemostatic, bandage in place   Neurological:      General: No focal deficit present.      Mental Status: He is alert and oriented to person, place, and time. Mental status is at baseline.   Psychiatric:         Mood and Affect: Mood normal.         Behavior: Behavior normal.         Thought Content: Thought content normal.         Judgment: Judgment normal.           Assessment/Plan:             ICD-10-CM ICD-9-CM   1. Hypotension, unspecified hypotension type  I95.9 458.9   2. Fall, initial encounter  W19.XXXA E888.9   3. Rhinorrhea  J34.89 478.19   4. HFrEF (heart failure with reduced ejection fraction)  I50.20 428.20   5. Type 2 diabetes mellitus with other specified complication, without long-term current use of insulin  E11.69 250.80     Orders Placed This Encounter   Procedures    Hemoglobin A1C       Assessment & Plan    - Addressed persistent hypotension causing recurrent falls; BP not rising above 80 despite increased Midodrine dose  - Considered medication interactions, particularly between cardiac medications and diuretics  -Considered  reducing Torsemide dose to 1/2 tablet to potentially improve BP while monitoring for fluid retention  - Attempting to expedite cardiology follow-up with Dr. Díaz due to urgent nature of hypotension and falls  - Noted improvement in leg edema and significant weight loss (60 lbs) following recent hospitalization  - Acknowledged ongoing GI issues including abdominal cramping and difficulty  eating, with upcoming GI follow-up for pancreatic mass evaluation      1. Hypotension, unspecified hypotension type (Primary)  Discussed options for management  Considered reducing torsemide by 50% but would need to carefully monitor for s/sx fluid retention  Also would consider titration of midodrine back upwards - pt says he has had no improvement despite taking increased doses outside of what is rx'd  Due to complexity of his condition, recommend pt be evaluated by cardiology to assist     2. Fall, initial encounter  Mechanical fall likely 2/2 hypotension as above      3. Rhinorrhea  Suspect allergic rhinitis rather than infectious  Continue flonase  Add astelin nasal spray  - azelastine (ASTELIN) 137 mcg (0.1 %) nasal spray; 1 spray (137 mcg total) by Nasal route 2 (two) times daily.  Dispense: 30 mL; Refill: 1    4. HFrEF (heart failure with reduced ejection fraction)  Follow-up with cardiology as scheduled (also see above)  Pt reports improvement in sx w/ albuterol nebs - may indicate more of a pulmonary process but he attributes his sx specifically to fluid overload  Refill albuterol nebs  - albuterol (PROVENTIL) 2.5 mg /3 mL (0.083 %) nebulizer solution; Take 3 mLs (2.5 mg total) by nebulization every 6 (six) hours as needed for Wheezing or Shortness of Breath. Rescue  Dispense: 60 each; Refill: 6    5. Type 2 diabetes mellitus with other specified complication, without long-term current use of insulin  Checking A1c, no changes to tx at this time  - Hemoglobin A1C; Future      No follow-ups on file.     Tripp Mohan MD, FAAFP  Family Medicine Physician  [facility]  02/25/2025      This note was generated with the assistance of ambient listening technology. Verbal consent was obtained by the patient and accompanying visitor(s) for the recording of patient appointment to facilitate this note. I attest to having reviewed and edited the generated note for accuracy, though some  syntax or spelling errors may persist. Please contact the author of this note for any clarification.

## 2025-02-25 NOTE — FIRST PROVIDER EVALUATION
"Medical screening examination initiated.  I have conducted a focused provider triage encounter, findings are as follows:    Brief history of present illness:  advised to come due to ED for worsening kidney function. Reports recent UTI, states he has 2 doses of abx left. Also reports R flank pain that started today.     Vitals:    02/25/25 1624   BP: (!) 78/51   Pulse: 74   Resp: 20   Temp: 97.8 °F (36.6 °C)   TempSrc: Oral   SpO2: 97%   Weight: 65.3 kg (144 lb)   Height: 6' 2" (1.88 m)       Pertinent physical exam:  Hypotensive in triage. Alert and oriented.     Brief workup plan:  sepsis work up    Preliminary workup initiated; this workup will be continued and followed by the physician or advanced practice provider that is assigned to the patient when roomed.  "

## 2025-02-25 NOTE — PROGRESS NOTES
Patient's last Venofer was received on 2/21. Therefore, the patient did NOT receive Venofer as scheduled today. Also patient had a fall earlier today and his blood pressure is running a little low 91/52, patient is on Metropolol and Midodrine, message sent to his PCP, Dr. Mohan and Cardiology Dr. Díaz. Patient will be back on schedule next week. Patient made aware and agreed.

## 2025-02-25 NOTE — PROGRESS NOTES
Progress Note  Nephrology      Referring physician: Tripp Mohan MD    Reason for visit: CKD     SUBJECTIVE:   74 y.o. male  has a past medical history of Anticoagulant long-term use, Basal cell carcinoma, BPH (benign prostatic hyperplasia), Carotid stenosis, Chronic kidney disease, Claudication, DDD (degenerative disc disease) (10/21/2013), Diabetes mellitus with renal complications, Disc disease, degenerative, cervical, DVT (deep venous thrombosis), Encounter for blood transfusion, GERD (gastroesophageal reflux disease), Gout, chronic, History of ulcerative colitis, HLD (hyperlipidemia), HTN (hypertension), Ileostomy in place (1982), Paroxysmal atrial fibrillation, RBBB, Squamous cell carcinoma (03/08/2018), Squamous cell carcinoma (04/12/2018), Syncope (07/06/2024), and Ventricular tachycardia. who has been following up in renal clinic for ckd management   The patient denies taking NSAIDs or new antibiotics, recreational drugs, recent episode of dehydration, diarrhea, nausea or vomiting, acute illness, hospitalization or exposure to IV radiocontrast.     ROS:  General: negative for chills, or fatigue  ENT: No epistaxis or headaches  Hematological and Lymphatic: No bleeding problems or blood clots.  Endocrine: No skin changes or temperature intolerance  Respiratory: No cough, shortness of breath, or wheezing  Cardiovascular: No chest pain or dyspnea   Gastrointestinal: No abdominal pain, change in bowel habits  Genito-Urinary: No dysuria, trouble voiding, or hematuria  Musculoskeletal: ROS: negative for - joint pain, joint stiffness, joint swelling, muscle pain or muscular weakness  Neurological: No focal weakness, no numbness  Dermatological: No rash or ulcers    OBJECTIVE:     There were no vitals filed for this visit.       Physical Exam:  General: no distress, well nourished  HENT: PERRLA, Normal mouth nose and ears.  Neck: no JVD and thyroid not enlarged, symmetric, no tenderness/mass/nodules  Lungs: clear  to auscultation bilaterally and normal respiratory effort  Cardiovascular: regular rate and rhythm, S1, S2 normal, no murmur, click, rub or gallop.   Abdomen: soft, non-tender non-distented; bowel sounds normal  Skin: No rashes or lesions  Musculoskeletal:no edema in LE, no deformities.   Lymph Nodes: No cervical or supraclavicular adenopathy  Neurologic: Normal strength and tone. No focal numbness or weakness          Medications:  Current Medications[1]         Laboratory:  Lab Results   Component Value Date    CREATININE 2.7 (H) 02/12/2025       Prot/Creat Ratio, Urine   Date Value Ref Range Status   02/21/2025 Unable to calculate 0.00 - 0.20 Final   06/24/2024 0.20 0.00 - 0.20 Final   10/13/2023 Unable to calculate 0.00 - 0.20 Final       Lab Results   Component Value Date     02/12/2025    K 3.8 02/12/2025    CO2 23 02/12/2025       last PTH   Lab Results   Component Value Date    .9 (H) 10/03/2024    CALCIUM 9.1 02/12/2025    PHOS 3.2 02/12/2025       Lab Results   Component Value Date    HGB 10.7 (L) 02/11/2025        Lab Results   Component Value Date    HGBA1C 6.5 (H) 12/27/2024       Lab Results   Component Value Date    LDLCALC 71.0 08/04/2024       Other Labs were reviewed      ASSESSMENT/PLAN:       CKD stage 4 c eGFR 18-20 mL/min c superimposed JELANI-  CKD clinically 2/2 age-related nephron loss,multiple Jelani episodes, chronic hypotension leading to hypoperfusion an recent worsening heart failure with EF 35 % and grade 2 DD, previous NSAID use for gout, volume depletion episodes c high ostomy output. Labile sCr. Progressive, clinically from frequent AKIs 2/2 hypotensive episodes.    - No longer using NSAIDs;  At risk for progression given frequent AKIs. On PPI.   - s/p admission 2 weeks ago for CHF exa, DVT and JELANI . Scr peaked to 3.1. last scr from 2/12 was 2.7  - on torsemide  20 bid, will be decreased to daily   Discussed about dialysis modalities and referral made to education class  He  has Ileostomy tube in place and with his multiple surgeries, he might not be a good candidate for PD  His BP seems to stay low and would be challenging for HD too  Referral made to transplant  Very high risk of progression to ESRD given persistently low BP and multiple GRADY, volume overload        Diabetes Mellitus   Continue current medications       Hypotension   Pt actually is very hypotensive and is running between 80-90 mmHg systolic   On midodrine       Anemia of iron def: hbg 10.7  T sat very low at 6  on Iron infusions         CKD-Bone mineral abnormalities   from 10/2024   Calcium and Phosphorous at goal      Metabolic Acidosis  Continue sodium bicarb      Hyperlipidemia   Continue statins       HFrEF and diastolic dysfunction   On torsemide 20 mg po BID for now    Asked to hold off with very low BP and if symptomatic with hypotension       Very high risk of progression to ESRD       RTC in       ROSITA ZEPEDA MD  NEPHROLOGY ATTENDING             [1]   Current Outpatient Medications:     ACCU-CHEK PAUL CONTROL SOLN Soln, 1 drop by NOT APPLICABLE route once daily., Disp: , Rfl:     ACCU-CHEK SOFTCLIX LANCETS Misc, TEST BLOOD SUGAR THREE TIMES DAILY, Disp: 300 each, Rfl: 3    albuterol (PROVENTIL) 2.5 mg /3 mL (0.083 %) nebulizer solution, Take 3 mLs (2.5 mg total) by nebulization every 6 (six) hours as needed for Wheezing or Shortness of Breath. Rescue, Disp: 60 each, Rfl: 6    alcohol swabs (DROPSAFE ALCOHOL PREP PADS) PadM, Apply 1 each topically once daily., Disp: 500 each, Rfl: 3    allopurinoL (ZYLOPRIM) 100 MG tablet, Take 4 tablets (400 mg total) by mouth once daily., Disp: 360 tablet, Rfl: 4    amiodarone (PACERONE) 200 MG Tab, Take 1 tablet (200 mg total) by mouth once daily., Disp: 30 tablet, Rfl: 0    antiox.mv no.10/omeg3s/lut/gilma (I-CAPS ORAL), Take 1 capsule by mouth once daily., Disp: , Rfl:     apixaban (ELIQUIS) 5 mg Tab, Take 1 tablet (5 mg total) by mouth 2 (two) times daily.,  Disp: 180 tablet, Rfl: 3    azelastine (ASTELIN) 137 mcg (0.1 %) nasal spray, 1 spray (137 mcg total) by Nasal route 2 (two) times daily., Disp: 30 mL, Rfl: 1    blood sugar diagnostic (ACCU-CHEK GUIDE TEST STRIPS) Strp, 1 each by Other route 3 (three) times daily. Test Blood Sugar, Disp: 300 strip, Rfl: 10    blood-glucose meter (ACCU-CHEK GUIDE GLUCOSE METER) Misc, Accucheck BID, Disp: 1 each, Rfl: 0    brimonidine 0.2% (ALPHAGAN) 0.2 % Drop, Place 1 drop into both eyes 2 (two) times a day., Disp: 60 mL, Rfl: 3    colchicine (COLCRYS) 0.6 mg tablet, Take 1 tablet (0.6 mg total) by mouth every other day., Disp: 45 tablet, Rfl: 3    docusate sodium 100 mg capsule, Take 100 mg by mouth 2 (two) times daily as needed for Constipation., Disp: , Rfl:     ferrous sulfate (IRON) 325 mg (65 mg iron) Tab tablet, Take 1 tablet (325 mg total) by mouth every other day., Disp: 45 tablet, Rfl: 3    glimepiride (AMARYL) 4 MG tablet, TAKE 1 TABLET TWICE DAILY BEFORE MEALS, Disp: 180 tablet, Rfl: 3    hydrOXYzine HCL (ATARAX) 25 MG tablet, Take 1 tablet (25 mg total) by mouth 3 (three) times daily as needed for Itching., Disp: 30 tablet, Rfl: 0    metoprolol succinate (TOPROL-XL) 25 MG 24 hr tablet, Take 1 tablet (25 mg total) by mouth once daily., Disp: 90 tablet, Rfl: 3    midodrine (PROAMATINE) 2.5 MG Tab, Take 1 tablet (2.5 mg total) by mouth 2 (two) times daily with meals., Disp: 60 tablet, Rfl: 1    pantoprazole (PROTONIX) 40 MG tablet, TAKE 1 TABLET ONE TIME DAILY, Disp: 90 tablet, Rfl: 3    pravastatin (PRAVACHOL) 40 MG tablet, Take 1 tablet (40 mg total) by mouth once daily., Disp: 90 tablet, Rfl: 3    predniSONE (DELTASONE) 2.5 MG tablet, Take 2.5 mg by mouth every other day., Disp: , Rfl:     sodium bicarbonate 650 MG tablet, Take 2 tablets (1,300 mg total) by mouth 2 (two) times daily., Disp: 360 tablet, Rfl: 3    tamsulosin (FLOMAX) 0.4 mg Cap, Take 1 capsule (0.4 mg total) by mouth once daily., Disp: 90 capsule, Rfl:  3    torsemide (DEMADEX) 20 MG Tab, Take 1 tablet (20 mg total) by mouth once daily., Disp: 30 tablet, Rfl: 0    Current Facility-Administered Medications:     leuprolide acetate (6 month) injection 45 mg, 45 mg, Intramuscular, Q6 Months, , 45 mg at 02/21/25 0955    Facility-Administered Medications Ordered in Other Visits:     0.9% NaCl 100 mL flush bag, , Intravenous, PRN, Neftaly Isabel MD    iron sucrose injection 100 mg, 100 mg, Intravenous, 1 time in Clinic/HOD, Neftaly Isabel MD    sodium chloride 0.9% flush 10 mL, 10 mL, Intravenous, PRN, Neftaly Isaebl MD    sodium chloride 0.9% in 1,000 mL infusion, , Intravenous, Continuous, Order, Paper

## 2025-02-25 NOTE — TELEPHONE ENCOUNTER
What will the dose adjustment be for the patient's Metoprolol and patient's Midodrine?  Please advise

## 2025-02-25 NOTE — ED PROVIDER NOTES
Encounter Date: 2/25/2025       History     Chief Complaint   Patient presents with    Hypotension    Abnormal Lab     On antibiotics for uti, abn kidney function L flank     Patient is a 74-year-old male with past medical history significant for CAD s/p PCI to mid LAD in 2017, Hx of VT, Atrial tachycardia s/p AT ablation in 4/2024 and DVT on Eliquis, CHF with the EF of 35%, SBO s/p colostomy, hypertension, hyperlipidemia, and Dm2 who presents for evaluation of abnormal labs which demonstrated worsening renal function.  Patient also complains of hypertension and lightheadedness which has persisted for the last several days.  Patient is a currently being treated for a urinary tract infection with Bactrim.  Patient has complained of intermittent cramping abdominal pain.  No exacerbating or alleviating factors. He denies any fever, chills, cough, congestion, nausea, vomiting, numbness, weakness    The history is provided by the patient and medical records.     Review of patient's allergies indicates:   Allergen Reactions    Atorvastatin     Rosuvastatin      Past Medical History:   Diagnosis Date    Anticoagulant long-term use     Basal cell carcinoma     BPH (benign prostatic hyperplasia)     s/p TURP    Carotid stenosis     Chronic kidney disease     Claudication     DDD (degenerative disc disease) 10/21/2013    Diabetes mellitus with renal complications     Disc disease, degenerative, cervical     DVT (deep venous thrombosis)     Encounter for blood transfusion     GERD (gastroesophageal reflux disease)     Gout, chronic     History of ulcerative colitis     s/p colectomy and ileostomy    HLD (hyperlipidemia)     HTN (hypertension)     Ileostomy in place 1982    Paroxysmal atrial fibrillation     RBBB     Squamous cell carcinoma 03/08/2018    Left superior helix near insertion    Squamous cell carcinoma 04/12/2018    Left forearm x 5    Syncope 07/06/2024    Ventricular tachycardia      Past Surgical History:    Procedure Laterality Date    ABLATION, SVT, ACCESSORY PATHWAY N/A 4/30/2024    Procedure: Ablation, SVT, Accessory Pathway;  Surgeon: Franky Taylor MD;  Location: [facility] EP LAB;  Service: Cardiology;  Laterality: N/A;  VT, RFA, Carto, MAC, GP, 3 Prep *MDT ILR in situ*    cardiac stents      CATARACT EXTRACTION Bilateral     CERVICAL FUSION      CHOLECYSTECTOMY N/A 2/14/2023    Procedure: CHOLECYSTECTOMY;  Surgeon: Mike Joyce MD;  Location: [facility] OR;  Service: General;  Laterality: N/A;  very high probabilty of converison to open    colectomy and ileostomy  1985    ENDOSCOPIC ULTRASOUND OF UPPER GASTROINTESTINAL TRACT N/A 2/10/2023    Procedure: ULTRASOUND, UPPER GI TRACT, ENDOSCOPIC;  Surgeon: Poli Fuller MD;  Location: [facility] ENDO;  Service: Endoscopy;  Laterality: N/A;    ERCP N/A 2/10/2023    Procedure: ERCP (ENDOSCOPIC RETROGRADE CHOLANGIOPANCREATOGRAPHY);  Surgeon: Poil Fuller MD;  Location: [facility] ENDO;  Service: Endoscopy;  Laterality: N/A;    EXCISION OF PAROTID GLAND Left 12/18/2020    Procedure: EXCISION, PAROTID GLAND;  Surgeon: Michael Pinzon MD;  [address];  Service: ENT;  Laterality: Left;    INSERTION OF IMPLANTABLE LOOP RECORDER  06/07/2021    INSERTION OF IMPLANTABLE LOOP RECORDER Left 6/7/2021    Procedure: INSERTION, IMPLANTABLE LOOP RECORDER;  Surgeon: Romario Dao MD;  Location: [facility] CATH LAB;  Service: Cardiology;  Laterality: Left;    LEFT HEART CATHETERIZATION Right 4/15/2021    Procedure: CATHETERIZATION, HEART, LEFT;  Surgeon: Marcio Jones MD;  Location: [facility] CATH LAB;  Service: Cardiology;  Laterality: Right;    LYSIS OF ADHESIONS N/A 11/9/2020    Procedure: LYSIS, ADHESIONS,  ERAS low;  Surgeon: CED Haley MD;  Location: [facility] OR 2ND FLR;  Service: Colon and Rectal;  Laterality: N/A;    LYSIS OF ADHESIONS N/A 2/14/2023    Procedure: LYSIS, ADHESIONS;  Surgeon: Mike Joyce MD;  Location: [facility] OR;  Service: General;  Laterality: N/A;     POUCHOSCOPY N/A 4/6/2022    Procedure: ENDOSCOPY, POUCH, SMALL INTESTINE, DIAGNOSTIC;  Surgeon: Dieter Juarez MD;  Location: [facility] ENDO (2ND FLR);  Service: Endoscopy;  Laterality: N/A;    REPAIR, HERNIA, PARASTOMAL N/A 11/9/2020    Procedure: REPAIR, HERNIA, PARASTOMAL;  Surgeon: CED Haley MD;  Location: [facility] OR 2ND FLR;  Service: Colon and Rectal;  Laterality: N/A;    TRANSURETHRAL RESECTION OF PROSTATE (TURP) WITHOUT USE OF LASER N/A 1/23/2019    Procedure: TURP, WITHOUT USING LASER BIPOLAR;  Surgeon: Catarino Mota MD;  Location: [facility] OR 1ST FLR;  Service: Urology;  Laterality: N/A;  1.5 HOURS    TREATMENT OF CARDIAC ARRHYTHMIA  4/30/2024    Procedure: Cardioversion or Defibrillation;  Surgeon: Franky Taylor MD;  Location: [facility] EP LAB;  Service: Cardiology;;     Family History   Problem Relation Name Age of Onset    Dementia Mother      Cancer Father      Diabetes Father      Heart disease Father      Melanoma Neg Hx      Hypertension Neg Hx      Arthritis Neg Hx       Social History[1]  Review of Systems    Physical Exam     Initial Vitals [02/25/25 1624]   BP Pulse Resp Temp SpO2   (!) 78/51 74 20 97.8 °F (36.6 °C) 97 %      MAP       --         Physical Exam    Nursing note and vitals reviewed.  Constitutional:   Chronically ill appearing   HENT:   Head: Normocephalic and atraumatic.   Eyes: EOM are normal. Pupils are equal, round, and reactive to light.   Neck: Neck supple.   Normal range of motion.  Cardiovascular:  Normal rate, regular rhythm and intact distal pulses.           No murmur heard.  Pulmonary/Chest: Breath sounds normal. No respiratory distress. He has no wheezes. He has no rales.   Abdominal: Abdomen is soft. He exhibits no distension. There is no abdominal tenderness.   Musculoskeletal:         General: No edema. Normal range of motion.      Cervical back: Normal range of motion and neck supple.     Neurological: He is alert and oriented to person, place, and time.   Skin:  Skin is warm and dry. Capillary refill takes less than 2 seconds.         ED Course   Procedures  Labs Reviewed   CBC W/ AUTO DIFFERENTIAL - Abnormal       Result Value    WBC 3.13 (*)     RBC 4.09 (*)     Hemoglobin 11.5 (*)     Hematocrit 36.1 (*)     MCV 88      MCH 28.1      MCHC 31.9 (*)     RDW 19.5 (*)     Platelets 188      MPV 11.1      Immature Granulocytes 0.6 (*)     Gran # (ANC) 2.1      Immature Grans (Abs) 0.02      Lymph # 0.6 (*)     Mono # 0.3      Eos # 0.0      Baso # 0.01      nRBC 0      Gran % 67.5      Lymph % 20.4      Mono % 10.9      Eosinophil % 0.3      Basophil % 0.3      Differential Method Automated     COMPREHENSIVE METABOLIC PANEL - Abnormal    Sodium 135 (*)     Potassium 4.0      Chloride 98      CO2 19 (*)     Glucose 101      BUN 93 (*)     Creatinine 5.3 (*)     Calcium 8.6 (*)     Total Protein 6.7      Albumin 3.6      Total Bilirubin 0.7      Alkaline Phosphatase 146      AST 83 (*)     ALT 73 (*)     eGFR 10.6 (*)     Anion Gap 18 (*)     Narrative:     add on HS Troponin-[id number] per Ede Phan MD    02/25/2025  17:32 T.Johnnie   TROPONIN I HIGH SENSITIVITY - Abnormal    Troponin I High Sensitivity 43 (*)     Narrative:     add on HS Troponin-[id number] per Ede Phan MD    02/25/2025  17:32 T.Johnnie   B-TYPE NATRIURETIC PEPTIDE - Abnormal     (*)     Narrative:     add on HS Troponin-[id number] per Ede Phan MD    02/25/2025  17:32 T.Johnnie      ADD ON BNP PER ALIN RICHMOND RN; EDE PHAN MD; #[id number]   02/25/2025  18:25    CULTURE, BLOOD    Blood Culture, Routine No Growth to date      Narrative:     Aerobic and anaerobic   CULTURE, BLOOD    Blood Culture, Routine No Growth to date      Narrative:     Aerobic and anaerobic   INFLUENZA A & B BY MOLECULAR    Influenza A, Molecular Invalid      Influenza B, Molecular Invalid      Flu A & B Source Nasal swab     CULTURE, URINE   CULTURE, URINE   URINALYSIS, REFLEX TO URINE CULTURE    Specimen UA  Urine, Clean Catch      Color, UA Yellow      Appearance, UA Clear      pH, UA 5.0      Specific Gravity, UA 1.015      Protein, UA Negative      Glucose, UA Negative      Ketones, UA Negative      Bilirubin (UA) Negative      Occult Blood UA Negative      Nitrite, UA Negative      Leukocytes, UA Negative      Narrative:     Specimen Source->Urine   SARS-COV-2 RNA AMPLIFICATION, QUAL    SARS-CoV-2 RNA, Amplification, Qual Negative     B-TYPE NATRIURETIC PEPTIDE   TROPONIN I HIGH SENSITIVITY   TROPONIN I HIGH SENSITIVITY    Troponin I High Sensitivity 30     POCT GLUCOSE    POCT Glucose 96     POCT GLUCOSE    POCT Glucose 96     POCT GLUCOSE MONITORING CONTINUOUS     EKG Readings: (Independently Interpreted)   Initial Reading: No STEMI. Rhythm: Normal Sinus Rhythm. Heart Rate: 67. Conduction: RBBB.     ECG Results              EKG 12-lead (Final result)        Collection Time Result Time QRS Duration OHS QTC Calculation    02/25/25 17:00:29 02/26/25 07:53:17 156 545                     Final result by Interface, Lab In [facility] (02/26/25 07:53:21)                   Narrative:    Test Reason : Z13.6,    Vent. Rate :  67 BPM     Atrial Rate :  67 BPM     P-R Int : 192 ms          QRS Dur : 156 ms      QT Int : 516 ms       P-R-T Axes :  29 -81  47 degrees    QTcB Int : 545 ms    Normal sinus rhythm  Left axis deviation  Right bundle branch block  Anterior infarct (cited on or before 12-Jul-2023)  Abnormal ECG  When compared with ECG of 25-Feb-2025 16:32,  Questionable change in initial forces of Septal leads  Confirmed by Jamie Whitten ([id number]) on 2/26/2025 7:53:15 AM    Referred By: AAAREFERRAL SELF           Confirmed By: Jamie Whitten                                     EKG 12-lead (Final result)        Collection Time Result Time QRS Duration OHS QTC Calculation    02/25/25 16:32:02 02/26/25 07:58:06 156 558                     Final result by Interface, Lab In [facility] (02/26/25 07:58:10)                    Narrative:    Test Reason :    Vent. Rate :  71 BPM     Atrial Rate :  71 BPM     P-R Int : 178 ms          QRS Dur : 156 ms      QT Int : 514 ms       P-R-T Axes :  78 268  59 degrees    QTcB Int : 558 ms    Normal sinus rhythm  Right bundle branch block  Anteroseptal infarct (cited on or before 12-Jul-2023)  Abnormal ECG  When compared with ECG of 04-Feb-2025 09:29,  Premature supraventricular complexes are no longer Present  Questionable change in initial forces of Anterior-septal leads  QT has lengthened  Confirmed by Jamie Whitten ([id number]) on 2/26/2025 7:58:05 AM    Referred By: AAAREFERRAL SELF           Confirmed By: Jamie Whitten                                  Imaging Results              X-Ray Chest AP Portable (Final result)  Result time 02/25/25 19:54:25      Final result by Sly Sanford MD (02/25/25 19:54:25)                   Impression:      1. Chronic appearing interstitial findings, no large focal consolidation.  2. Calcific density projects along the lateral right upper and mid lung zones suggesting pleural calcification or pleural plaques.      Electronically signed by: Sly Sanford MD  Date:    02/25/2025  Time:    19:54               Narrative:    EXAMINATION:  XR CHEST AP PORTABLE    CLINICAL HISTORY:  Sepsis;    TECHNIQUE:  Single frontal view of the chest was performed.    COMPARISON:  02/09/2025    FINDINGS:  The cardiomediastinal silhouette is prominent, similar to the previous exam noting hiatal hernia.  Left chest wall recording device noted..  There is no pleural effusion.  The trachea is midline.  The lungs are symmetrically expanded bilaterally with mildly coarse interstitial attenuation.  There is calcific appearing density projected along the right hemithorax, adjacent to several anterior ribs.  Findings may reflect sequela of remote injury or possibly pleural plaque.  There is no pneumothorax.  The osseous structures are remarkable for degenerative change..                                        CT Abdomen Pelvis  Without Contrast (Final result)  Result time 02/25/25 20:01:10      Final result by Manav Odonnell MD (02/25/25 20:01:10)                   Impression:      No evidence for obstructive uropathy as clinically questioned.    Pancreatic head/uncinate process cystic lesion, better evaluated on prior MR imaging.    Operative changes of the bowel as above.    Additional findings as detailed in the body of the report.    Electronically signed by resident: Samy Faye  Date:    02/25/2025  Time:    19:21    Electronically signed by: Manav Odonnell MD  Date:    02/25/2025  Time:    20:01               Narrative:    EXAMINATION:  CT ABDOMEN PELVIS WITHOUT CONTRAST    CLINICAL HISTORY:  Flank pain, kidney stone suspected;hx of pancreatic mass;    TECHNIQUE:  Axial CT images of the abdomen and pelvis were obtained via helical, multi detector CT technique.  No intravenous contrast material was administered.    COMPARISON:  CT abdomen pelvis 02/03/2025.    MRI abdomen pelvis 01/24/2025.    FINDINGS:  Lack of intravenous contrast limits sensitivity for parenchymal organ disease.    Heart: No cardiomegaly or pericardial effusion.  Coronary artery calcific atherosclerosis.    Lung Bases: Mild bibasilar subsegmental atelectasis.    Liver: Normal in size and attenuation without focal hepatic abnormality.    Gallbladder: Surgically absent.    Bile Ducts: No intra or extrahepatic biliary ductal dilation.    Pancreas: Cystic lesion at the pancreatic head/uncinate process (axial image 62), not well delineated without intravenous contrast, and better evaluated on prior MR. No duct dilatation.  Mild parenchymal atrophy.    Spleen: Normal.  Prominent splenule.    Adrenals: Normal.    Genitourinary: Mild cortical atrophy.  Bilateral cortical exophytic hypodensities, too small to characterize.  No nephrolithiasis or hydroureteronephrosis.  Bladder demonstrates smooth contours without bladder wall  thickening.    Reproductive organs: Prior TURP.    GI tract/Mesentery: Operative change of colectomy with right lower quadrant ostomy.  Stomach is normal in appearance.  No evidence for bowel obstruction or inflammation.    Peritoneal Space: No intraperitoneal free air.  Trace presacral stranding.  Scattered surgical clips left upper quadrant.    Lymph nodes: Prominent mesenteric lymph nodes, nonenlarged by size criteria.    Abdominal wall: Right lower quadrant ostomy.  Scattered body wall stranding.  Subcutaneous calcification over the bilateral flanks.    Vasculature: Abdominal aorta is normal in caliber.  Mild aortoiliac calcific atherosclerosis.    Bones: Degenerative change without acute displaced fracture or bony destructive process.                                       Medications   apixaban tablet 5 mg (5 mg Oral Given 2/26/25 0922)   pravastatin tablet 40 mg (40 mg Oral Given 2/26/25 0923)   tamsulosin 24 hr capsule 0.4 mg (0.4 mg Oral Given 2/26/25 0923)   insulin aspart U-100 pen 0-5 Units (has no administration in time range)   sodium chloride 0.9% flush 10 mL (10 mLs Intravenous Given 2/26/25 0433)   naloxone 0.4 mg/mL injection 0.02 mg (has no administration in time range)   glucose chewable tablet 16 g (has no administration in time range)   glucose chewable tablet 24 g (has no administration in time range)   dextrose 50% injection 12.5 g (has no administration in time range)   dextrose 50% injection 25 g (has no administration in time range)   glucagon (human recombinant) injection 1 mg (has no administration in time range)   midodrine tablet 10 mg (10 mg Oral Given 2/26/25 1712)   metoprolol succinate (TOPROL-XL) 24 hr split tablet 12.5 mg (12.5 mg Oral Given 2/26/25 1104)   amiodarone tablet 200 mg (200 mg Oral Given 2/26/25 1104)   oseltamivir capsule 30 mg (has no administration in time range)   vancomycin 1750 mg in 0.9% sodium chloride 500 mL IVPB (0 mg Intravenous Stopped 2/25/25 2045)    magnesium sulfate 2g in water 50mL IVPB (premix) (0 g Intravenous Stopped 2/26/25 1302)   perflutren protein-A microsphr 0.22 mg/mL IV susp (0.66 mg Intravenous Given 2/26/25 1015)   sodium chloride 0.9% bolus 250 mL 250 mL (0 mLs Intravenous Stopped 2/26/25 1346)     Medical Decision Making  Patient presents for abnormal labs with worsened renal function. Ddx: ACS, arrhythmia, electrolyte abnormality, covid, flu, pneumonia, sepsis, UTI. Intially hypotensive to 78/51. Sepsis work up intiaited and treated with antibiotics. Pt's blood pressure improved without intervention to the 100s systolic. Held on fluids in setting of CHF hx. EKG without ST elevation. Trop at baseline. BNP downtrended from prior. CT abdomen/pelv without emergent process. Case discussed with hospital medicine, plan for admission for further management and evaluation     Amount and/or Complexity of Data Reviewed  Labs: ordered.  Radiology: ordered.    Risk  Prescription drug management.                                      Clinical Impression:  Final diagnoses:  [Z13.6] Screening for cardiovascular condition  [N17.9] Acute kidney injury          ED Disposition Condition    Observation                     [1]   Social History  Tobacco Use    Smoking status: Never     Passive exposure: Never    Smokeless tobacco: Never   Substance Use Topics    Alcohol use: No    Drug use: No        Chace Suarez Jr., MD  Resident  02/26/25 1012

## 2025-02-25 NOTE — ED TRIAGE NOTES
Patient presents to the ED today with reports of hypotension and abnormal lab. Patient states that he has a regular schedule appointment today where his Cr was noted to be 5.4. Patient also states has been battling low BP on and off for a week. On arrival to ED BP 78/51. Patient reports on antibiotics for UTI currently reports decrease urine production. Patient aaox4 endorses generalized weakness at this time

## 2025-02-26 ENCOUNTER — TELEPHONE (OUTPATIENT)
Dept: UROLOGY | Facility: HOSPITAL | Age: 75
End: 2025-02-26
Payer: MEDICARE

## 2025-02-26 DIAGNOSIS — C61 PROSTATE CANCER: Primary | ICD-10-CM

## 2025-02-26 PROBLEM — Z79.01 CHRONIC ANTICOAGULATION: Status: ACTIVE | Noted: 2025-02-26

## 2025-02-26 PROBLEM — I48.91 HYPERCOAGULABILITY DUE TO ATRIAL FIBRILLATION: Status: ACTIVE | Noted: 2025-02-26

## 2025-02-26 PROBLEM — I50.22 CHRONIC SYSTOLIC HEART FAILURE: Status: ACTIVE | Noted: 2024-08-09

## 2025-02-26 PROBLEM — I50.21 ACUTE SYSTOLIC HEART FAILURE: Status: ACTIVE | Noted: 2024-08-09

## 2025-02-26 PROBLEM — D68.69 HYPERCOAGULABILITY DUE TO ATRIAL FIBRILLATION: Status: ACTIVE | Noted: 2025-02-26

## 2025-02-26 PROBLEM — K94.19 ALTERED BOWEL ELIMINATION DUE TO INTESTINAL OSTOMY: Status: ACTIVE | Noted: 2025-02-26

## 2025-02-26 LAB
ALBUMIN SERPL BCP-MCNC: 3.3 G/DL (ref 3.5–5.2)
ALP SERPL-CCNC: 138 U/L (ref 40–150)
ALT SERPL W/O P-5'-P-CCNC: 79 U/L (ref 10–44)
ANION GAP SERPL CALC-SCNC: 15 MMOL/L (ref 8–16)
AST SERPL-CCNC: 87 U/L (ref 10–40)
AV AREA BY CONTINUOUS VTI: 2.4 CM2
AV INDEX (PROSTH): 0.65
AV LVOT MEAN GRADIENT: 1 MMHG
AV LVOT PEAK GRADIENT: 2 MMHG
AV MEAN GRADIENT: 3 MMHG
AV PEAK GRADIENT: 5 MMHG
AV VALVE AREA BY VELOCITY RATIO: 2.4 CM²
AV VALVE AREA: 2.5 CM2
AV VELOCITY RATIO: 0.64
BASOPHILS # BLD AUTO: 0.01 K/UL (ref 0–0.2)
BASOPHILS NFR BLD: 0.3 % (ref 0–1.9)
BILIRUB SERPL-MCNC: 0.6 MG/DL (ref 0.1–1)
BSA FOR ECHO PROCEDURE: 1.83 M2
BUN SERPL-MCNC: 94 MG/DL (ref 8–23)
CALCIUM SERPL-MCNC: 8.4 MG/DL (ref 8.7–10.5)
CHLORIDE SERPL-SCNC: 99 MMOL/L (ref 95–110)
CO2 SERPL-SCNC: 22 MMOL/L (ref 23–29)
CREAT SERPL-MCNC: 5.6 MG/DL (ref 0.5–1.4)
CREAT UR-MCNC: 59 MG/DL (ref 23–375)
CV ECHO LV RWT: 0.53 CM
DIFFERENTIAL METHOD BLD: ABNORMAL
DOP CALC AO PEAK VEL: 1.1 M/S
DOP CALC AO VTI: 19.3 CM
DOP CALC LVOT AREA: 3.8 CM2
DOP CALC LVOT DIAMETER: 2.2 CM
DOP CALC LVOT PEAK VEL: 0.7 M/S
DOP CALC LVOT STROKE VOLUME: 47.5 CM3
DOP CALCLVOT PEAK VEL VTI: 12.5 CM
E WAVE DECELERATION TIME: 307 MS
E WAVE DECELERATION TIME: 321 MS
E/A RATIO: 0.6
E/E' RATIO: 10 M/S
ECHO EF ESTIMATED: 43 %
ECHO LV POSTERIOR WALL: 1.4 CM (ref 0.6–1.1)
EOSINOPHIL # BLD AUTO: 0 K/UL (ref 0–0.5)
EOSINOPHIL NFR BLD: 1 % (ref 0–8)
ERYTHROCYTE [DISTWIDTH] IN BLOOD BY AUTOMATED COUNT: 19.2 % (ref 11.5–14.5)
EST. GFR  (NO RACE VARIABLE): 9.9 ML/MIN/1.73 M^2
FRACTIONAL SHORTENING: 20.8 % (ref 28–44)
GLUCOSE SERPL-MCNC: 85 MG/DL (ref 70–110)
HCT VFR BLD AUTO: 34.6 % (ref 40–54)
HGB BLD-MCNC: 11 G/DL (ref 14–18)
IMM GRANULOCYTES # BLD AUTO: 0.01 K/UL (ref 0–0.04)
IMM GRANULOCYTES NFR BLD AUTO: 0.3 % (ref 0–0.5)
INFLUENZA A, MOLECULAR: DETECTED
INFLUENZA B, MOLECULAR: NOT DETECTED
INTERVENTRICULAR SEPTUM: 1.1 CM (ref 0.6–1.1)
LA MAJOR: 8.2 CM
LA MINOR: 6.9 CM
LA WIDTH: 3.6 CM
LEFT ATRIUM SIZE: 5.1 CM
LEFT ATRIUM VOLUME INDEX MOD: 38 ML/M2
LEFT ATRIUM VOLUME INDEX: 62 ML/M2
LEFT ATRIUM VOLUME MOD: 72 ML
LEFT ATRIUM VOLUME: 117 CM3
LEFT INTERNAL DIMENSION IN SYSTOLE: 4.2 CM (ref 2.1–4)
LEFT VENTRICLE DIASTOLIC VOLUME INDEX: 71.28 ML/M2
LEFT VENTRICLE DIASTOLIC VOLUME: 134 ML
LEFT VENTRICLE MASS INDEX: 144.4 G/M2
LEFT VENTRICLE SYSTOLIC VOLUME INDEX: 40.4 ML/M2
LEFT VENTRICLE SYSTOLIC VOLUME: 76 ML
LEFT VENTRICULAR INTERNAL DIMENSION IN DIASTOLE: 5.3 CM (ref 3.5–6)
LEFT VENTRICULAR MASS: 271.6 G
LV LATERAL E/E' RATIO: 8.7
LV SEPTAL E/E' RATIO: 12.2
LYMPHOCYTES # BLD AUTO: 0.9 K/UL (ref 1–4.8)
LYMPHOCYTES NFR BLD: 28.3 % (ref 18–48)
MAGNESIUM SERPL-MCNC: 1.4 MG/DL (ref 1.6–2.6)
MCH RBC QN AUTO: 27.8 PG (ref 27–31)
MCHC RBC AUTO-ENTMCNC: 31.8 G/DL (ref 32–36)
MCV RBC AUTO: 88 FL (ref 82–98)
MONOCYTES # BLD AUTO: 0.3 K/UL (ref 0.3–1)
MONOCYTES NFR BLD: 9.8 % (ref 4–15)
MV PEAK A VEL: 1.02 M/S
MV PEAK E VEL: 0.61 M/S
NEUTROPHILS # BLD AUTO: 1.9 K/UL (ref 1.8–7.7)
NEUTROPHILS NFR BLD: 60.3 % (ref 38–73)
NRBC BLD-RTO: 0 /100 WBC
OHS CV RV/LV RATIO: 0.58 CM
OHS LV EJECTION FRACTION SIMPSONS BIPLANE MOD: 30 %
OHS QRS DURATION: 156 MS
OHS QRS DURATION: 156 MS
OHS QTC CALCULATION: 545 MS
OHS QTC CALCULATION: 558 MS
OSMOLALITY UR: 350 MOSM/KG (ref 50–1200)
PHOSPHATE SERPL-MCNC: 5.7 MG/DL (ref 2.7–4.5)
PISA TR MAX VEL: 2.5 M/S
PLATELET # BLD AUTO: 130 K/UL (ref 150–450)
PMV BLD AUTO: 9.8 FL (ref 9.2–12.9)
POCT GLUCOSE: 103 MG/DL (ref 70–110)
POCT GLUCOSE: 108 MG/DL (ref 70–110)
POCT GLUCOSE: 118 MG/DL (ref 70–110)
POCT GLUCOSE: 173 MG/DL (ref 70–110)
POTASSIUM SERPL-SCNC: 3.8 MMOL/L (ref 3.5–5.1)
PROT SERPL-MCNC: 6.3 G/DL (ref 6–8.4)
RA MAJOR: 6.69 CM
RA PRESSURE ESTIMATED: 8 MMHG
RA WIDTH: 3.98 CM
RBC # BLD AUTO: 3.95 M/UL (ref 4.6–6.2)
RIGHT VENTRICLE DIASTOLIC BASEL DIMENSION: 3.1 CM
RSV AG BY MOLECULAR METHOD: NOT DETECTED
RV TB RVSP: 11 MMHG
SARS-COV-2 RNA RESP QL NAA+PROBE: NOT DETECTED
SINUS: 3.36 CM
SODIUM SERPL-SCNC: 136 MMOL/L (ref 136–145)
SODIUM UR-SCNC: 17 MMOL/L (ref 20–250)
STJ: 3.45 CM
TDI LATERAL: 0.07 M/S
TDI SEPTAL: 0.05 M/S
TDI: 0.06 M/S
TRICUSPID ANNULAR PLANE SYSTOLIC EXCURSION: 2.55 CM
TV PEAK GRADIENT: 25 MMHG
TV REST PULMONARY ARTERY PRESSURE: 33 MMHG
UUN UR-MCNC: 550 MG/DL (ref 140–1050)
WBC # BLD AUTO: 3.15 K/UL (ref 3.9–12.7)
Z-SCORE OF LEFT VENTRICULAR DIMENSION IN END DIASTOLE: 0.09
Z-SCORE OF LEFT VENTRICULAR DIMENSION IN END SYSTOLE: 2.06

## 2025-02-26 PROCEDURE — 25000003 PHARM REV CODE 250: Mod: HCNC

## 2025-02-26 PROCEDURE — 82570 ASSAY OF URINE CREATININE: CPT | Mod: HCNC

## 2025-02-26 PROCEDURE — A4216 STERILE WATER/SALINE, 10 ML: HCPCS | Mod: HCNC

## 2025-02-26 PROCEDURE — 0241U SARS-COV2 (COVID) WITH FLU/RSV BY PCR: CPT | Mod: HCNC

## 2025-02-26 PROCEDURE — 63600175 PHARM REV CODE 636 W HCPCS: Mod: HCNC

## 2025-02-26 PROCEDURE — 27000207 HC ISOLATION: Mod: HCNC

## 2025-02-26 PROCEDURE — 63600175 PHARM REV CODE 636 W HCPCS: Mod: HCNC | Performed by: HOSPITALIST

## 2025-02-26 PROCEDURE — 25000003 PHARM REV CODE 250: Mod: HCNC | Performed by: HOSPITALIST

## 2025-02-26 PROCEDURE — 99223 1ST HOSP IP/OBS HIGH 75: CPT | Mod: HCNC,GC,, | Performed by: STUDENT IN AN ORGANIZED HEALTH CARE EDUCATION/TRAINING PROGRAM

## 2025-02-26 PROCEDURE — 36415 COLL VENOUS BLD VENIPUNCTURE: CPT | Mod: HCNC

## 2025-02-26 PROCEDURE — 80053 COMPREHEN METABOLIC PANEL: CPT | Mod: HCNC

## 2025-02-26 PROCEDURE — 84300 ASSAY OF URINE SODIUM: CPT | Mod: HCNC

## 2025-02-26 PROCEDURE — 25500020 PHARM REV CODE 255: Mod: HCNC

## 2025-02-26 PROCEDURE — 83735 ASSAY OF MAGNESIUM: CPT | Mod: HCNC

## 2025-02-26 PROCEDURE — 84100 ASSAY OF PHOSPHORUS: CPT | Mod: HCNC

## 2025-02-26 PROCEDURE — 85025 COMPLETE CBC W/AUTO DIFF WBC: CPT | Mod: HCNC

## 2025-02-26 PROCEDURE — 83935 ASSAY OF URINE OSMOLALITY: CPT | Mod: HCNC

## 2025-02-26 PROCEDURE — 11000001 HC ACUTE MED/SURG PRIVATE ROOM: Mod: HCNC

## 2025-02-26 PROCEDURE — 84540 ASSAY OF URINE/UREA-N: CPT | Mod: HCNC

## 2025-02-26 RX ORDER — OSELTAMIVIR PHOSPHATE 30 MG/1
30 CAPSULE ORAL DAILY
Status: DISCONTINUED | OUTPATIENT
Start: 2025-02-26 | End: 2025-02-27

## 2025-02-26 RX ORDER — CEFEPIME HYDROCHLORIDE 1 G/1
1 INJECTION, POWDER, FOR SOLUTION INTRAMUSCULAR; INTRAVENOUS
Status: DISCONTINUED | OUTPATIENT
Start: 2025-02-26 | End: 2025-02-26

## 2025-02-26 RX ORDER — MIDODRINE HYDROCHLORIDE 5 MG/1
10 TABLET ORAL
Status: DISCONTINUED | OUTPATIENT
Start: 2025-02-26 | End: 2025-03-04 | Stop reason: HOSPADM

## 2025-02-26 RX ORDER — METRONIDAZOLE 500 MG/100ML
500 INJECTION, SOLUTION INTRAVENOUS
Status: DISCONTINUED | OUTPATIENT
Start: 2025-02-26 | End: 2025-02-26

## 2025-02-26 RX ORDER — MAGNESIUM SULFATE HEPTAHYDRATE 40 MG/ML
2 INJECTION, SOLUTION INTRAVENOUS ONCE
Status: COMPLETED | OUTPATIENT
Start: 2025-02-26 | End: 2025-02-26

## 2025-02-26 RX ORDER — BRIMONIDINE TARTRATE 2 MG/ML
1 SOLUTION/ DROPS OPHTHALMIC 2 TIMES DAILY
Status: DISCONTINUED | OUTPATIENT
Start: 2025-02-26 | End: 2025-03-04 | Stop reason: HOSPADM

## 2025-02-26 RX ORDER — AMIODARONE HYDROCHLORIDE 200 MG/1
200 TABLET ORAL DAILY
Status: DISCONTINUED | OUTPATIENT
Start: 2025-02-26 | End: 2025-03-04 | Stop reason: HOSPADM

## 2025-02-26 RX ADMIN — MIDODRINE HYDROCHLORIDE 10 MG: 5 TABLET ORAL at 11:02

## 2025-02-26 RX ADMIN — PIPERACILLIN AND TAZOBACTAM 4.5 G: 4; .5 INJECTION, POWDER, LYOPHILIZED, FOR SOLUTION INTRAVENOUS; PARENTERAL at 05:02

## 2025-02-26 RX ADMIN — METOPROLOL SUCCINATE 12.5 MG: 25 TABLET, EXTENDED RELEASE ORAL at 11:02

## 2025-02-26 RX ADMIN — HUMAN ALBUMIN MICROSPHERES AND PERFLUTREN 0.66 MG: 10; .22 INJECTION, SOLUTION INTRAVENOUS at 10:02

## 2025-02-26 RX ADMIN — BRIMONIDINE TARTRATE 1 DROP: 2 SOLUTION OPHTHALMIC at 10:02

## 2025-02-26 RX ADMIN — SODIUM CHLORIDE 250 ML: 9 INJECTION, SOLUTION INTRAVENOUS at 12:02

## 2025-02-26 RX ADMIN — SODIUM CHLORIDE, PRESERVATIVE FREE 10 ML: 5 INJECTION INTRAVENOUS at 09:02

## 2025-02-26 RX ADMIN — TAMSULOSIN HYDROCHLORIDE 0.4 MG: 0.4 CAPSULE ORAL at 09:02

## 2025-02-26 RX ADMIN — OSELTAMIVIR PHOSPHATE 30 MG: 30 CAPSULE ORAL at 09:02

## 2025-02-26 RX ADMIN — APIXABAN 5 MG: 5 TABLET, FILM COATED ORAL at 09:02

## 2025-02-26 RX ADMIN — PRAVASTATIN SODIUM 40 MG: 40 TABLET ORAL at 09:02

## 2025-02-26 RX ADMIN — SODIUM CHLORIDE, PRESERVATIVE FREE 10 ML: 5 INJECTION INTRAVENOUS at 04:02

## 2025-02-26 RX ADMIN — AMIODARONE HYDROCHLORIDE 200 MG: 200 TABLET ORAL at 11:02

## 2025-02-26 RX ADMIN — MAGNESIUM SULFATE HEPTAHYDRATE 2 G: 40 INJECTION, SOLUTION INTRAVENOUS at 11:02

## 2025-02-26 RX ADMIN — MIDODRINE HYDROCHLORIDE 10 MG: 5 TABLET ORAL at 05:02

## 2025-02-26 NOTE — ASSESSMENT & PLAN NOTE
- trend troponin, elevated BNP  - Cardiac diet   - Strict I&O   - Supplemental O2 to keep O2sat > 90%  - Cardiology consulted as patient with recent admission for similar   - holding lasix for now as patient on room air and hypotensive

## 2025-02-26 NOTE — CARE UPDATE
"I have reviewed the chart of Jarrett Charlton JrLuna who is hospitalized for the following:    Active Hospital Problems    Diagnosis    *Acute renal failure superimposed on stage 4 chronic kidney disease     Avoid nephrotoxic drug and NSAIDs      Body mass index (BMI) less than 19     Underweight        Chronic anticoagulation     On eliquis for afib      Hypercoagulability due to atrial fibrillation     On eliquis for afib      Altered bowel elimination due to intestinal ostomy    UTI (urinary tract infection)    Sepsis    Pancreatic mass     "1.4 cm hypodense lesion in the pancreatic head. It is difficult to tell if this is cystic or solid. Further evaluation with contrast enhanced MRI is recommended. "    Will consult [facility]      Paroxysmal atrial fibrillation    Acute systolic heart failure    Elevated troponin    Prostate cancer    Hypotension    T2DM (type 2 diabetes mellitus)    Essential hypertension        Mame Mason NP  Unit Based [facility]   "

## 2025-02-26 NOTE — PLAN OF CARE
Problem: Adult Inpatient Plan of Care  Goal: Plan of Care Review  Outcome: Progressing  Goal: Patient-Specific Goal (Individualized)  Outcome: Progressing  Goal: Absence of Hospital-Acquired Illness or Injury  Outcome: Progressing  Goal: Optimal Comfort and Wellbeing  Outcome: Progressing  Goal: Readiness for Transition of Care  Outcome: Progressing     Problem: Diabetes Comorbidity  Goal: Blood Glucose Level Within Targeted Range  Outcome: Progressing     Problem: Sepsis/Septic Shock  Goal: Optimal Coping  Outcome: Progressing  Goal: Absence of Bleeding  Outcome: Progressing  Goal: Blood Glucose Level Within Targeted Range  Outcome: Progressing  Goal: Absence of Infection Signs and Symptoms  Outcome: Progressing  Goal: Optimal Nutrition Intake  Outcome: Progressing     Problem: Acute Kidney Injury/Impairment  Goal: Fluid and Electrolyte Balance  Outcome: Progressing  Goal: Improved Oral Intake  Outcome: Progressing  Goal: Effective Renal Function  Outcome: Progressing     Problem: Wound  Goal: Optimal Coping  Outcome: Progressing  Goal: Optimal Functional Ability  Outcome: Progressing  Goal: Absence of Infection Signs and Symptoms  Outcome: Progressing  Goal: Improved Oral Intake  Outcome: Progressing  Goal: Optimal Pain Control and Function  Outcome: Progressing  Goal: Skin Health and Integrity  Outcome: Progressing  Goal: Optimal Wound Healing  Outcome: Progressing             POC updated and reviewed with the patient at bedside. Questions regarding POC were encouraged and addressed. VSS, see flowsheets. Tele maintained per provider's order. Patient is AO x 4 at this time. NAEON.  Seizure, fall, and safety precautions maintained. No signs of injury noted over night. Upon exiting room, patient's bed locked in low position, side rails up x 3, with call light within reach. Instructed patient to call staff for mobility, verbalized understanding.  No acute signs of distress noted at this time.

## 2025-02-26 NOTE — PROGRESS NOTES
Pharmacist Renal Dose Adjustment Note    Jarrett Charlton Jr. is a 74 y.o. male being treated with piperacillin / tazobactam for urinary tract infection.     Patient Data:    Vital Signs (Most Recent):  Temp: 98 °F (36.7 °C) (02/25/25 1917)  Pulse: 65 (02/25/25 1917)  Resp: 20 (02/25/25 1917)  BP: (!) 107/53 (02/25/25 1917)  SpO2: 100 % (02/25/25 1917) Vital Signs (72h Range):  Temp:  [97.8 °F (36.6 °C)-98 °F (36.7 °C)]   Pulse:  [63-74]   Resp:  [16-20]   BP: ()/(48-62)   SpO2:  [96 %-100 %]      Recent Labs   Lab 02/25/25  0947 02/25/25  1655   CREATININE 5.4* 5.3*     Serum creatinine: 5.3 mg/dL (H) 02/25/25 1655  Estimated creatinine clearance: 11.1 mL/min (A)    Piperacillin / tazobactam 4.5 grams IVPB every 8 hours will be changed to piperacillin / tazobactam IVPB every 12 hours.    Pharmacist's Name: Serge Zuñiga  Pharmacist's Extension: [phone]

## 2025-02-26 NOTE — ASSESSMENT & PLAN NOTE
Patient presents with hypotension. On exam, the patient was noted to be normal heart rate,  with adequate perfusion, and no signs of shock. Pt reported  dizziness, lightheadedness. Blood pressure normalized without any intervention and further monitoring was initiated. Differential diagnosis includes  sepsis and heart failure exacerbation.

## 2025-02-26 NOTE — TELEPHONE ENCOUNTER
Lab Results   Component Value Date    PSA 1.2 10/19/2024    PSA 0.18 11/19/2020    PSA 7.9 (H) 04/20/2020    PSADIAG 1.1 01/31/2025    PSADIAG 1.1 09/01/2024    PSADIAG 2.4 05/09/2024      Prostate cancer  -     Prostate Specific Antigen, Diagnostic; Future; Expected date: 02/26/2025  -     Prior authorization Order  -     leuprolide acetate, 6 month, (LUPRON) 45 mg SyKt injection; Inject 45 mg into the muscle every 6 (six) months. for 4 doses     Had lupron injection by NICKY Yo on 2/21/25.    I would rather see him in 6 months with PSA, possible lupron injection than on 3/27/25.

## 2025-02-26 NOTE — ASSESSMENT & PLAN NOTE
Patient has long standing persistent (>12 months) atrial fibrillation. Patient is currently in sinus rhythm. [id number] Score: 4. The patients heart rate in the last 24 hours is as follows:  Pulse  Min: 63  Max: 74     Antiarrhythmics       Anticoagulants  apixaban tablet 5 mg, 2 times daily, Oral    Plan  - Replete lytes with a goal of K>4, Mg >2  - Patient is anticoagulated, see medications listed above.  - Patient's afib is currently controlled

## 2025-02-26 NOTE — ASSESSMENT & PLAN NOTE
Patient's FSGs are controlled on current medication regimen.  Last A1c reviewed-   Lab Results   Component Value Date    HGBA1C 6.6 (H) 02/25/2025     Most recent fingerstick glucose reviewed-   Recent Labs   Lab 02/25/25 2117 02/25/25 2134   POCTGLUCOSE 96 96     Current correctional scale  Low  Maintain anti-hyperglycemic dose as follows-   Antihyperglycemics (From admission, onward)      Start     Stop Route Frequency Ordered    02/25/25 2140  insulin aspart U-100 pen 0-5 Units         -- SubQ Before meals & nightly PRN 02/25/25 2040          Hold Oral hypoglycemics while patient is in the hospital.

## 2025-02-26 NOTE — HPI
Mr. Charlton is a 74-year-old male with a complex medical history, including CKD stage 4, CAD s/p PCI to mid-LAD in 2017, h/o VT, AT s/p AT ablation in 4/2024, DVT on Eliquis, CHF with EF of 35%, SBO s/p colostomy, HTN, HLD, and DM2. He presented to the ED for evaluation of abnormal labs, which showed worsening renal function. The patient reports a week-long history of hypotension, dizziness, and lightheadedness. He recently visited an urgent care clinic for dysuria and was prescribed Bactrim. He also saw his nephrologist today, where the possibility of HD vs. transplant referral was discussed.    In the ED, the patient was initially hypotensive with BP 78/51, but improved to 107/53 without intervention. Labs were significant for WBC 3.13, Hgb 11.5, Plts 188, CO2 19, BUN 93, Cr 5.3 (baseline 2.4), AST 83, ALT 73, , and Trop 43. CXR showed chronic-appearing interstitial findings with no large focal consolidation. Calcific density along the lateral right upper and mid lung zones suggested pleural calcification or plaques. CT Abd/Pelvis showed no obstructive uropathy and a pancreatic head/uncinate cystic lesion, best evaluated on prior MR imaging. The patient was started on vanc and zosyn for suspected urosepsis and admitted to hospital medicine for further management.

## 2025-02-26 NOTE — PROGRESS NOTES
Pharmacokinetic Initial Assessment: IV Vancomycin    Assessment/Plan:    Initiate intravenous vancomycin with loading dose of 1750 mg once, done in ED.  In setting of GRADY, check random vancomycin level 12-24 hours after initial dose to determine if scheduling subsequent doses appropriate or if intermittent dosing indicated.   Desired empiric serum trough concentration is 10 to 20 mcg/mL.  Draw vancomycin trough level 60 min prior to fourth dose on 02/26/2025 at 1200.  Pharmacy will continue to follow and monitor vancomycin.      Please contact pharmacy at extension [phone] with any questions regarding this assessment.     Thank you for the consult,   Serge Zuñiga       Patient brief summary:  Jarrett Charlton Jr. is a 74 y.o. male initiated on antimicrobial therapy with IV Vancomycin for treatment of suspected urinary tract infection.    Drug Allergies:   Review of patient's allergies indicates:   Allergen Reactions    Atorvastatin     Rosuvastatin        Actual Body Weight:   65.3 kg    Renal Function:   Estimated Creatinine Clearance: 11.1 mL/min (A) (based on SCr of 5.3 mg/dL (H)).    CBC (last 72 hours):  Recent Labs   Lab Result Units 02/25/25  0947 02/25/25  1655   WBC K/uL 4.32 3.13*   Hemoglobin g/dL 11.5* 11.5*   Hemoglobin A1C % 6.6*  --    Hematocrit % 37.3* 36.1*   Platelets K/uL 150 188   Gran % % 71.5 67.5   Lymph % % 16.0* 20.4   Mono % % 10.9 10.9   Eosinophil % % 0.2 0.3   Basophil % % 0.7 0.3   Differential Method  Automated Automated       Metabolic Panel (last 72 hours):  Recent Labs   Lab Result Units 02/25/25  0937 02/25/25  0947 02/25/25  1655 02/25/25  1944   Sodium mmol/L  --  137 135*  --    Potassium mmol/L  --  4.1 4.0  --    Chloride mmol/L  --  98 98  --    CO2 mmol/L  --  23 19*  --    Glucose mg/dL  --  107 101  --    Glucose, UA  Negative  --   --  Negative   BUN mg/dL  --  95* 93*  --    Creatinine mg/dL  --  5.4* 5.3*  --    Creatinine, Urine mg/dL 89.0  --   --   --    Albumin  "g/dL  --  3.6 3.6  --    Total Bilirubin mg/dL  --   --  0.7  --    Alkaline Phosphatase U/L  --   --  146  --    AST U/L  --   --  83*  --    ALT U/L  --   --  73*  --    Phosphorus mg/dL  --  5.5*  --   --        Drug levels (last 3 results):  No results for input(s): "VANCOMYCINRA", "VANCORANDOM", "VANCOMYCINPE", "VANCOPEAK", "VANCOMYCINTR", "VANCOTROUGH" in the last 72 hours.    Microbiologic Results:  Microbiology Results (last 7 days)       Procedure Component Value Units Date/Time    Urine Culture High Risk [[id number]]     Order Status: Completed Specimen: Urine, Clean Catch     Urine Culture High Risk [[id number]]     Order Status: Canceled Specimen: Urine, Clean Catch     Influenza A & B by Molecular [[id number]] Collected: 02/25/25 1751    Order Status: Completed Specimen: Nasopharyngeal Swab Updated: 02/25/25 1925     Influenza A, Molecular Invalid     Influenza B, Molecular Invalid     Flu A & B Source Nasal swab    Blood culture x two cultures. Draw prior to antibiotics. [[id number]] Collected: 02/25/25 1655    Order Status: Sent Specimen: Blood from Peripheral, Antecubital, Left Updated: 02/25/25 1723    Blood culture x two cultures. Draw prior to antibiotics. [[id number]] Collected: 02/25/25 1655    Order Status: Sent Specimen: Blood from Peripheral, Forearm, Right Updated: 02/25/25 1723            "

## 2025-02-26 NOTE — SUBJECTIVE & OBJECTIVE
Past Medical History:   Diagnosis Date    Anticoagulant long-term use     Basal cell carcinoma     BPH (benign prostatic hyperplasia)     s/p TURP    Carotid stenosis     Chronic kidney disease     Claudication     DDD (degenerative disc disease) 10/21/2013    Diabetes mellitus with renal complications     Disc disease, degenerative, cervical     DVT (deep venous thrombosis)     Encounter for blood transfusion     GERD (gastroesophageal reflux disease)     Gout, chronic     History of ulcerative colitis     s/p colectomy and ileostomy    HLD (hyperlipidemia)     HTN (hypertension)     Ileostomy in place 1982    Paroxysmal atrial fibrillation     RBBB     Squamous cell carcinoma 03/08/2018    Left superior helix near insertion    Squamous cell carcinoma 04/12/2018    Left forearm x 5    Syncope 07/06/2024    Ventricular tachycardia        Past Surgical History:   Procedure Laterality Date    ABLATION, SVT, ACCESSORY PATHWAY N/A 4/30/2024    Procedure: Ablation, SVT, Accessory Pathway;  Surgeon: Franky Taylor MD;  Location: [facility] EP LAB;  Service: Cardiology;  Laterality: N/A;  VT, RFA, Carto, MAC, GP, 3 Prep *MDT ILR in situ*    cardiac stents      CATARACT EXTRACTION Bilateral     CERVICAL FUSION      CHOLECYSTECTOMY N/A 2/14/2023    Procedure: CHOLECYSTECTOMY;  Surgeon: Mike Joyce MD;  Location: [facility] OR;  Service: General;  Laterality: N/A;  very high probabilty of converison to open    colectomy and ileostomy  1985    ENDOSCOPIC ULTRASOUND OF UPPER GASTROINTESTINAL TRACT N/A 2/10/2023    Procedure: ULTRASOUND, UPPER GI TRACT, ENDOSCOPIC;  Surgeon: Poli Fuller MD;  Location: [facility] ENDO;  Service: Endoscopy;  Laterality: N/A;    ERCP N/A 2/10/2023    Procedure: ERCP (ENDOSCOPIC RETROGRADE CHOLANGIOPANCREATOGRAPHY);  Surgeon: Poli Fulelr MD;  Location: [facility] ENDO;  Service: Endoscopy;  Laterality: N/A;    EXCISION OF PAROTID GLAND Left 12/18/2020    Procedure: EXCISION, PAROTID GLAND;  Surgeon: Michael  BETTY Pinzon MD;  Location: [facility] OR 2ND FLR;  Service: ENT;  Laterality: Left;    INSERTION OF IMPLANTABLE LOOP RECORDER  06/07/2021    INSERTION OF IMPLANTABLE LOOP RECORDER Left 6/7/2021    Procedure: INSERTION, IMPLANTABLE LOOP RECORDER;  Surgeon: Romario Dao MD;  Location: [facility] CATH LAB;  Service: Cardiology;  Laterality: Left;    LEFT HEART CATHETERIZATION Right 4/15/2021    Procedure: CATHETERIZATION, HEART, LEFT;  Surgeon: Marcio Jones MD;  Location: [facility] CATH LAB;  Service: Cardiology;  Laterality: Right;    LYSIS OF ADHESIONS N/A 11/9/2020    Procedure: LYSIS, ADHESIONS,  ERAS low;  Surgeon: CED Haley MD;  Location: [facility] OR 2ND FLR;  Service: Colon and Rectal;  Laterality: N/A;    LYSIS OF ADHESIONS N/A 2/14/2023    Procedure: LYSIS, ADHESIONS;  Surgeon: Mike Joyce MD;  Location: [facility];  Service: General;  Laterality: N/A;    POUCHOSCOPY N/A 4/6/2022    Procedure: ENDOSCOPY, POUCH, SMALL INTESTINE, DIAGNOSTIC;  Surgeon: Dieter Juarez MD;  Location: [facility] ENDO (2ND FLR);  Service: Endoscopy;  Laterality: N/A;    REPAIR, HERNIA, PARASTOMAL N/A 11/9/2020    Procedure: REPAIR, HERNIA, PARASTOMAL;  Surgeon: CED Haley MD;  Location: [facility] OR 2ND FLR;  Service: Colon and Rectal;  Laterality: N/A;    TRANSURETHRAL RESECTION OF PROSTATE (TURP) WITHOUT USE OF LASER N/A 1/23/2019    Procedure: TURP, WITHOUT USING LASER BIPOLAR;  Surgeon: Catarino Mota MD;  Location: [facility] OR 1ST FLR;  Service: Urology;  Laterality: N/A;  1.5 HOURS    TREATMENT OF CARDIAC ARRHYTHMIA  4/30/2024    Procedure: Cardioversion or Defibrillation;  Surgeon: Franky Taylor MD;  Location: [facility] EP LAB;  Service: Cardiology;;       Review of patient's allergies indicates:   Allergen Reactions    Atorvastatin     Rosuvastatin      Current Facility-Administered Medications   Medication Frequency    amiodarone tablet 200 mg Daily    apixaban tablet 5 mg BID    dextrose 50% injection 12.5 g PRN    dextrose 50%  injection 25 g PRN    glucagon (human recombinant) injection 1 mg PRN    glucose chewable tablet 16 g PRN    glucose chewable tablet 24 g PRN    insulin aspart U-100 pen 0-5 Units QID (AC + HS) PRN    magnesium sulfate 2g in water 50mL IVPB (premix) Once    metoprolol succinate (TOPROL-XL) 24 hr split tablet 12.5 mg Daily    midodrine tablet 10 mg TID WM    naloxone 0.4 mg/mL injection 0.02 mg PRN    pravastatin tablet 40 mg Daily    sodium chloride 0.9% bolus 250 mL 250 mL Once    sodium chloride 0.9% flush 10 mL Q12H PRN    tamsulosin 24 hr capsule 0.4 mg Daily     Facility-Administered Medications Ordered in Other Encounters   Medication Frequency    sodium chloride 0.9% in 1,000 mL infusion Continuous     Family History       Problem Relation (Age of Onset)    Cancer Father    Dementia Mother    Diabetes Father    Heart disease Father          Tobacco Use    Smoking status: Never     Passive exposure: Never    Smokeless tobacco: Never   Substance and Sexual Activity    Alcohol use: No    Drug use: No    Sexual activity: Not Currently     Partners: Female     Review of Systems  Objective:     Vital Signs (Most Recent):  Temp: 97.7 °F (36.5 °C) (02/26/25 1109)  Pulse: 85 (02/26/25 1114)  Resp: 18 (02/26/25 1109)  BP: (!) 78/39 (02/26/25 1114)  SpO2: (!) 94 % (02/26/25 1114) Vital Signs (24h Range):  Temp:  [97.5 °F (36.4 °C)-98.2 °F (36.8 °C)] 97.7 °F (36.5 °C)  Pulse:  [63-85] 85  Resp:  [16-20] 18  SpO2:  [94 %-100 %] 94 %  BP: ()/(39-77) 78/39     Weight: 64.4 kg (142 lb) (02/26/25 0806)  Body mass index is 18.23 kg/m².  Body surface area is 1.83 meters squared.    I/O last 3 completed shifts:  In: 600 [IV Piggyback:600]  Out: -      Physical Exam  Vitals and nursing note reviewed.   Constitutional:       Appearance: Normal appearance. He is ill-appearing.   HENT:      Nose: No congestion or rhinorrhea.   Cardiovascular:      Rate and Rhythm: Normal rate and regular rhythm.      Pulses: Normal pulses.       Heart sounds: Normal heart sounds.   Pulmonary:      Effort: Pulmonary effort is normal. No respiratory distress.      Breath sounds: Normal breath sounds. No wheezing.   Abdominal:      General: There is no distension.      Tenderness: There is no abdominal tenderness.   Musculoskeletal:      Right lower leg: No edema.      Left lower leg: No edema.   Skin:     General: Skin is warm and dry.   Neurological:      Mental Status: He is alert and oriented to person, place, and time.   Psychiatric:         Mood and Affect: Mood normal.         Behavior: Behavior normal.          Significant Labs:  All labs within the past 24 hours have been reviewed.    Significant Imaging:

## 2025-02-26 NOTE — ASSESSMENT & PLAN NOTE
"This patient does have evidence of infective focus  My overall impression is sepsis.  Source: Urinary Tract  Antibiotics given-   Antibiotics (72h ago, onward)      Start     Stop Route Frequency Ordered    02/26/25 0500  piperacillin-tazobactam (ZOSYN) 4.5 g in D5W 100 mL IVPB (MB+)         -- IV Every 12 hours (non-standard times) 02/25/25 2113 02/25/25 2141  vancomycin - pharmacy to dose  (vancomycin IVPB (PEDS and ADULTS))        Placed in "And" Linked Group    -- IV pharmacy to manage frequency 02/25/25 2042          Latest lactate reviewed-  Recent Labs   Lab 02/25/25  1708   POCLAC 1.5     Organ dysfunction indicated by Acute kidney injury    Fluid challenge Contraindicated- Fluid bolus is contraindicated in this patient due to Congestive Heart Failure     Post- resuscitation assessment No - Post resuscitation assessment not needed       Will Not start Pressors- Levophed for MAP of 65  Source control achieved by:   "

## 2025-02-26 NOTE — PROGRESS NOTES
Vancomycin discontinued. Pharmacy to sign off at this time.    Please call or re-consult if needed.    Jay Alba, Ancelmo  Spectra [phone]

## 2025-02-26 NOTE — H&P
[facility] - Emergency Dept  [facility] Medicine  History & Physical    Patient Name: Jarrett Charlton Jr.  MRN: [id number]  Patient Class: OP- Observation  Admission Date: 2/25/2025  Attending Physician: Jay Edmonds MD   Primary Care Provider: Tripp Mohan MD         Patient information was obtained from patient, past medical records, and ER records.     Subjective:     Principal Problem:Acute renal failure superimposed on stage 4 chronic kidney disease    Chief Complaint:   Chief Complaint   Patient presents with    Hypotension    Abnormal Lab     On antibiotics for uti, abn kidney function L flank        HPI: Mr. Charlton is a 74-year-old male with a complex medical history, including CKD stage 4, CAD s/p PCI to mid-LAD in 2017, h/o VT, AT s/p AT ablation in 4/2024, DVT on Eliquis, CHF with EF of 35%, SBO s/p colostomy, HTN, HLD, and DM2. He presented to the ED for evaluation of abnormal labs, which showed worsening renal function. The patient reports a week-long history of hypotension, dizziness, and lightheadedness. He recently visited an urgent care clinic for dysuria and was prescribed Bactrim. He also saw his nephrologist today, where the possibility of HD vs. transplant referral was discussed.    In the ED, the patient was initially hypotensive with BP 78/51, but improved to 107/53 without intervention. Labs were significant for WBC 3.13, Hgb 11.5, Plts 188, CO2 19, BUN 93, Cr 5.3 (baseline 2.4), AST 83, ALT 73, , and Trop 43. CXR showed chronic-appearing interstitial findings with no large focal consolidation. Calcific density along the lateral right upper and mid lung zones suggested pleural calcification or plaques. CT Abd/Pelvis showed no obstructive uropathy and a pancreatic head/uncinate cystic lesion, best evaluated on prior MR imaging. The patient was started on vanc and zosyn for suspected urosepsis and admitted to hospital medicine for further management.    Past Medical History:    Diagnosis Date    Anticoagulant long-term use     Basal cell carcinoma     BPH (benign prostatic hyperplasia)     s/p TURP    Carotid stenosis     Chronic kidney disease     Claudication     DDD (degenerative disc disease) 10/21/2013    Diabetes mellitus with renal complications     Disc disease, degenerative, cervical     DVT (deep venous thrombosis)     Encounter for blood transfusion     GERD (gastroesophageal reflux disease)     Gout, chronic     History of ulcerative colitis     s/p colectomy and ileostomy    HLD (hyperlipidemia)     HTN (hypertension)     Ileostomy in place 1982    Paroxysmal atrial fibrillation     RBBB     Squamous cell carcinoma 03/08/2018    Left superior helix near insertion    Squamous cell carcinoma 04/12/2018    Left forearm x 5    Syncope 07/06/2024    Ventricular tachycardia        Past Surgical History:   Procedure Laterality Date    ABLATION, SVT, ACCESSORY PATHWAY N/A 4/30/2024    Procedure: Ablation, SVT, Accessory Pathway;  Surgeon: Franky Taylor MD;  Location: [facility] EP LAB;  Service: Cardiology;  Laterality: N/A;  VT, RFA, Carto, MAC, GP, 3 Prep *MDT ILR in situ*    cardiac stents      CATARACT EXTRACTION Bilateral     CERVICAL FUSION      CHOLECYSTECTOMY N/A 2/14/2023    Procedure: CHOLECYSTECTOMY;  Surgeon: Mike Joyce MD;  Location: [facility] OR;  Service: General;  Laterality: N/A;  very high probabilty of converison to open    colectomy and ileostomy  1985    ENDOSCOPIC ULTRASOUND OF UPPER GASTROINTESTINAL TRACT N/A 2/10/2023    Procedure: ULTRASOUND, UPPER GI TRACT, ENDOSCOPIC;  Surgeon: Poli Fuller MD;  Location: [facility] ENDO;  Service: Endoscopy;  Laterality: N/A;    ERCP N/A 2/10/2023    Procedure: ERCP (ENDOSCOPIC RETROGRADE CHOLANGIOPANCREATOGRAPHY);  Surgeon: Poli Fuller MD;  Location: [facility] ENDO;  Service: Endoscopy;  Laterality: N/A;    EXCISION OF PAROTID GLAND Left 12/18/2020    Procedure: EXCISION, PAROTID GLAND;  Surgeon: Michael Pinzon MD;   Location: [facility] 2ND FLR;  Service: ENT;  Laterality: Left;    INSERTION OF IMPLANTABLE LOOP RECORDER  06/07/2021    INSERTION OF IMPLANTABLE LOOP RECORDER Left 6/7/2021    Procedure: INSERTION, IMPLANTABLE LOOP RECORDER;  Surgeon: Romario Dao MD;  Location: [facility] CATH LAB;  Service: Cardiology;  Laterality: Left;    LEFT HEART CATHETERIZATION Right 4/15/2021    Procedure: CATHETERIZATION, HEART, LEFT;  Surgeon: Marcio Jones MD;  Location: [facility] CATH LAB;  Service: Cardiology;  Laterality: Right;    LYSIS OF ADHESIONS N/A 11/9/2020    Procedure: LYSIS, ADHESIONS,  ERAS low;  Surgeon: CED Haley MD;  Location: [facility] OR 2ND FLR;  Service: Colon and Rectal;  Laterality: N/A;    LYSIS OF ADHESIONS N/A 2/14/2023    Procedure: LYSIS, ADHESIONS;  Surgeon: Mike Joyce MD;  Location: [facility];  Service: General;  Laterality: N/A;    POUCHOSCOPY N/A 4/6/2022    Procedure: ENDOSCOPY, POUCH, SMALL INTESTINE, DIAGNOSTIC;  Surgeon: Dieter Juarez MD;  Location: [facility] ENDO (2ND FLR);  Service: Endoscopy;  Laterality: N/A;    REPAIR, HERNIA, PARASTOMAL N/A 11/9/2020    Procedure: REPAIR, HERNIA, PARASTOMAL;  Surgeon: CED Haley MD;  Location: [facility] OR 2ND FLR;  Service: Colon and Rectal;  Laterality: N/A;    TRANSURETHRAL RESECTION OF PROSTATE (TURP) WITHOUT USE OF LASER N/A 1/23/2019    Procedure: TURP, WITHOUT USING LASER BIPOLAR;  Surgeon: Catarino Mota MD;  Location: [facility] OR 1ST FLR;  Service: Urology;  Laterality: N/A;  1.5 HOURS    TREATMENT OF CARDIAC ARRHYTHMIA  4/30/2024    Procedure: Cardioversion or Defibrillation;  Surgeon: Franky Taylor MD;  Location: [facility] EP LAB;  Service: Cardiology;;       Review of patient's allergies indicates:   Allergen Reactions    Atorvastatin     Rosuvastatin        Current Facility-Administered Medications on File Prior to Encounter   Medication    leuprolide acetate (6 month) injection 45 mg    sodium chloride 0.9% in 1,000 mL infusion    [DISCONTINUED]  0.9% NaCl 100 mL flush bag    [DISCONTINUED] iron sucrose injection 100 mg    [DISCONTINUED] sodium chloride 0.9% flush 10 mL     Current Outpatient Medications on File Prior to Encounter   Medication Sig    ACCU-CHEK PAUL CONTROL SOLN Soln 1 drop by NOT APPLICABLE route once daily.    ACCU-CHEK SOFTCLIX LANCETS [facility] TEST BLOOD SUGAR THREE TIMES DAILY    albuterol (PROVENTIL) 2.5 mg /3 mL (0.083 %) nebulizer solution Take 3 mLs (2.5 mg total) by nebulization every 6 (six) hours as needed for Wheezing or Shortness of Breath. Rescue    alcohol swabs (DROPSAFE ALCOHOL PREP PADS) PadM Apply 1 each topically once daily.    allopurinoL (ZYLOPRIM) 100 MG tablet Take 4 tablets (400 mg total) by mouth once daily.    amiodarone (PACERONE) 200 MG Tab Take 1 tablet (200 mg total) by mouth once daily.    antiox.mv no.10/omeg3s/lut/gilma (I-CAPS ORAL) Take 1 capsule by mouth once daily.    apixaban (ELIQUIS) 5 mg Tab Take 1 tablet (5 mg total) by mouth 2 (two) times daily.    azelastine (ASTELIN) 137 mcg (0.1 %) nasal spray 1 spray (137 mcg total) by Nasal route 2 (two) times daily.    blood sugar diagnostic (ACCU-CHEK GUIDE TEST STRIPS) Strp 1 each by Other route 3 (three) times daily. Test Blood Sugar    blood-glucose meter (ACCU-CHEK GUIDE GLUCOSE METER) [facility] Accucheck BID    brimonidine 0.2% (ALPHAGAN) 0.2 % Drop Place 1 drop into both eyes 2 (two) times a day.    colchicine (COLCRYS) 0.6 mg tablet Take 1 tablet (0.6 mg total) by mouth every other day.    docusate sodium 100 mg capsule Take 100 mg by mouth 2 (two) times daily as needed for Constipation.    ferrous sulfate (IRON) 325 mg (65 mg iron) Tab tablet Take 1 tablet (325 mg total) by mouth every other day.    glimepiride (AMARYL) 4 MG tablet TAKE 1 TABLET TWICE DAILY BEFORE MEALS    hydrOXYzine HCL (ATARAX) 25 MG tablet Take 1 tablet (25 mg total) by mouth 3 (three) times daily as needed for Itching.    metoprolol succinate (TOPROL-XL) 25 MG 24 hr tablet Take 1 tablet  (25 mg total) by mouth once daily.    midodrine (PROAMATINE) 2.5 MG Tab Take 1 tablet (2.5 mg total) by mouth 2 (two) times daily with meals.    pantoprazole (PROTONIX) 40 MG tablet TAKE 1 TABLET ONE TIME DAILY    pravastatin (PRAVACHOL) 40 MG tablet Take 1 tablet (40 mg total) by mouth once daily.    predniSONE (DELTASONE) 2.5 MG tablet Take 2.5 mg by mouth every other day.    sodium bicarbonate 650 MG tablet Take 2 tablets (1,300 mg total) by mouth 2 (two) times daily.    tamsulosin (FLOMAX) 0.4 mg Cap Take 1 capsule (0.4 mg total) by mouth once daily.    torsemide (DEMADEX) 20 MG Tab Take 1 tablet (20 mg total) by mouth once daily.     Family History       Problem Relation (Age of Onset)    Cancer Father    Dementia Mother    Diabetes Father    Heart disease Father          Tobacco Use    Smoking status: Never     Passive exposure: Never    Smokeless tobacco: Never   Substance and Sexual Activity    Alcohol use: No    Drug use: No    Sexual activity: Not Currently     Partners: Female     Review of Systems   Constitutional:  Positive for fatigue. Negative for appetite change, chills, diaphoresis and fever.   HENT:  Negative for congestion.    Respiratory:  Negative for cough, shortness of breath and wheezing.    Cardiovascular:  Negative for chest pain, palpitations and leg swelling.   Gastrointestinal:  Negative for abdominal pain, blood in stool, constipation, diarrhea, nausea and vomiting.   Genitourinary:  Positive for difficulty urinating, dysuria and frequency. Negative for flank pain.   Musculoskeletal:  Negative for arthralgias, joint swelling and myalgias.   Skin:  Negative for pallor and rash.   Neurological:  Positive for dizziness and light-headedness. Negative for headaches.   Psychiatric/Behavioral:  Negative for agitation. The patient is not nervous/anxious.      Objective:     Vital Signs (Most Recent):  Temp: 98 °F (36.7 °C) (02/25/25 1917)  Pulse: 65 (02/25/25 1917)  Resp: 20 (02/25/25  1917)  BP: (!) 107/53 (02/25/25 1917)  SpO2: 100 % (02/25/25 1917) Vital Signs (24h Range):  Temp:  [97.8 °F (36.6 °C)-98 °F (36.7 °C)] 98 °F (36.7 °C)  Pulse:  [63-74] 65  Resp:  [16-20] 20  SpO2:  [96 %-100 %] 100 %  BP: ()/(48-62) 107/53     Weight: 65.3 kg (144 lb)  Body mass index is 18.49 kg/m².     Physical Exam  Constitutional:       General: He is not in acute distress.     Appearance: Normal appearance. He is ill-appearing.   HENT:      Head: Normocephalic and atraumatic.      Nose: Nose normal.   Eyes:      Extraocular Movements: Extraocular movements intact.      Pupils: Pupils are equal, round, and reactive to light.   Cardiovascular:      Rate and Rhythm: Normal rate and regular rhythm.      Heart sounds: No murmur heard.  Pulmonary:      Effort: Pulmonary effort is normal. No respiratory distress.      Breath sounds: No wheezing or rales.   Chest:      Chest wall: No tenderness.   Abdominal:      General: Abdomen is flat. Bowel sounds are normal. There is no distension.      Palpations: Abdomen is soft.      Tenderness: There is no abdominal tenderness.      Comments: Ostomy with some stool output   Musculoskeletal:         General: No swelling or tenderness. Normal range of motion.      Cervical back: Normal range of motion.      Right lower leg: Edema present.      Left lower leg: Edema present.   Lymphadenopathy:      Cervical: No cervical adenopathy.   Skin:     General: Skin is warm and dry.      Coloration: Skin is not jaundiced.      Findings: No bruising.   Neurological:      General: No focal deficit present.      Mental Status: He is alert and oriented to person, place, and time.   Psychiatric:         Mood and Affect: Mood normal.         Behavior: Behavior normal.              CRANIAL NERVES     CN III, IV, VI   Pupils are equal, round, and reactive to light.       Significant Labs: All pertinent labs within the past 24 hours have been reviewed.  CBC:   Recent Labs   Lab  "02/25/25  0947 02/25/25  1655 02/25/25  1708   WBC 4.32 3.13*  --    HGB 11.5* 11.5*  --    HCT 37.3* 36.1* 37.2    188  --      CMP:   Recent Labs   Lab 02/25/25  0947 02/25/25  1655    135*   K 4.1 4.0   CL 98 98   CO2 23 19*    101   BUN 95* 93*   CREATININE 5.4* 5.3*   CALCIUM 8.5* 8.6*   PROT  --  6.7   ALBUMIN 3.6 3.6   BILITOT  --  0.7   ALKPHOS  --  146   AST  --  83*   ALT  --  73*   ANIONGAP 16 18*       Significant Imaging: I have reviewed all pertinent imaging results/findings within the past 24 hours.  Assessment/Plan:     * Acute renal failure superimposed on stage 4 chronic kidney disease  GRADY is likely due to acute tubular necrosis caused by hypotension . Baseline creatinine is  2.4 . Most recent creatinine and eGFR are listed below.  Recent Labs     02/25/25  0947 02/25/25  1655   CREATININE 5.4* 5.3*   EGFRNORACEVR 10.4* 10.6*      Plan  - GRADY is worsening. Will continue current treatment  - Avoid nephrotoxins and renally dose meds for GFR listed above  - Monitor urine output, serial BMP, and adjust therapy as needed  - Nephrology consulted, appreciate recs    Sepsis  This patient does have evidence of infective focus  My overall impression is sepsis.  Source: Urinary Tract  Antibiotics given-   Antibiotics (72h ago, onward)      Start     Stop Route Frequency Ordered    02/26/25 0500  piperacillin-tazobactam (ZOSYN) 4.5 g in D5W 100 mL IVPB (MB+)         -- IV Every 12 hours (non-standard times) 02/25/25 2113 02/25/25 2141  vancomycin - pharmacy to dose  (vancomycin IVPB (PEDS and ADULTS))        Placed in "And" Linked Group    -- IV pharmacy to manage frequency 02/25/25 2042          Latest lactate reviewed-  Recent Labs   Lab 02/25/25  1708   POCLAC 1.5     Organ dysfunction indicated by Acute kidney injury    Fluid challenge Contraindicated- Fluid bolus is contraindicated in this patient due to Congestive Heart Failure     Post- resuscitation assessment No - Post " resuscitation assessment not needed       Will Not start Pressors- Levophed for MAP of 65  Source control achieved by:     UTI (urinary tract infection)  See sepsis         Pancreatic mass  Pancreatic head/uncinate process cystic lesion, better evaluated on prior MR imaging.       Paroxysmal atrial fibrillation  Patient has long standing persistent (>12 months) atrial fibrillation. Patient is currently in sinus rhythm. [id number] Score: 4. The patients heart rate in the last 24 hours is as follows:  Pulse  Min: 63  Max: 74     Antiarrhythmics       Anticoagulants  apixaban tablet 5 mg, 2 times daily, Oral    Plan  - Replete lytes with a goal of K>4, Mg >2  - Patient is anticoagulated, see medications listed above.  - Patient's afib is currently controlled      HFrEF (heart failure with reduced ejection fraction)    - trend troponin, elevated BNP  - Cardiac diet   - Strict I&O   - Supplemental O2 to keep O2sat > 90%  - Cardiology consulted as patient with recent admission for similar   - holding lasix for now as patient on room air and hypotensive      Elevated troponin  Pt presenting with hypotension. Trop elevated to 43. EKG negative for ST elevation. Pt denies any chest pain. Likely to be demand ischemia in the setting of hypotension.       Echo  Result Date: 1/24/2025    Left Ventricle: The left ventricle is mildly dilated. Ventricular mass   is normal. Normal wall thickness. Severe global hypokinesis present. There   is severely reduced systolic function. Ejection fraction is approximately   30%. Grade I diastolic dysfunction.    Right Ventricle: Normal right ventricular cavity size. Wall thickness   is normal. Right ventricle wall motion has global hypokinesis. Systolic   function is moderately reduced.    Left Atrium: Left atrium is severely dilated.    Right Atrium: Right atrium is moderately dilated.    Aortic Valve: The aortic valve is a trileaflet valve. There is mild   aortic valve sclerosis. There is  mild aortic regurgitation with a   centrally directed jet.    Mitral Valve: There is severe regurgitation with a centrally directed   jet.    Tricuspid Valve: There is moderate to severe regurgitation with a   centrally directed jet.    Pulmonary Artery: There is mild to moderate pulmonary hypertension. The   estimated pulmonary artery systolic pressure is 42 mmHg.    IVC/SVC: Elevated venous pressure at 15 mmHg.    Pericardium: There is a small circumferential effusion. Left pleural   effusion.    - ECHO  - Trend Trop  - Cardiology consult if uptrending, chest pain or EKG changes      Prostate cancer  S/p TURP  Appears to be on leuprolide injections per MAR    Hypotension  Patient presents with hypotension. On exam, the patient was noted to be normal heart rate,  with adequate perfusion, and no signs of shock. Pt reported  dizziness, lightheadedness. Blood pressure normalized without any intervention and further monitoring was initiated. Differential diagnosis includes  sepsis and heart failure exacerbation.     Essential hypertension  Patient's blood pressure range in the last 24 hours was: BP  Min: 78/51  Max: 107/53.The patient's inpatient anti-hypertensive regimen is listed below:  Current Antihypertensives       Plan  - BP is uncontrolled, will adjust as follows: Hold anti HTN medications in the setting of hypotension    T2DM (type 2 diabetes mellitus)  Patient's FSGs are controlled on current medication regimen.  Last A1c reviewed-   Lab Results   Component Value Date    HGBA1C 6.6 (H) 02/25/2025     Most recent fingerstick glucose reviewed-   Recent Labs   Lab 02/25/25 2117 02/25/25 2134   POCTGLUCOSE 96 96     Current correctional scale  Low  Maintain anti-hyperglycemic dose as follows-   Antihyperglycemics (From admission, onward)      Start     Stop Route Frequency Ordered    02/25/25 2140  insulin aspart U-100 pen 0-5 Units         -- SubQ Before meals & nightly PRN 02/25/25 2040          Hold Oral  hypoglycemics while patient is in the hospital.      VTE Risk Mitigation (From admission, onward)           Ordered     apixaban tablet 5 mg  2 times daily         02/25/25 2040                           Pharmacokinetic Initial Assessment: IV Vancomycin    Assessment/Plan:    Initiate intravenous vancomycin with loading dose of 1750 mg once, done in ED.  In setting of GRADY, check random vancomycin level 12-24 hours after initial dose to determine if scheduling subsequent doses appropriate or if intermittent dosing indicated.   Desired empiric serum trough concentration is 10 to 20 mcg/mL.  Draw vancomycin trough level 60 min prior to fourth dose on 02/26/2025 at 1200.  Pharmacy will continue to follow and monitor vancomycin.      Please contact pharmacy at extension [phone] with any questions regarding this assessment.     Thank you for the consult,   Serge Zuñiga       Patient brief summary:  Jarrett Charlton Jr. is a 74 y.o. male initiated on antimicrobial therapy with IV Vancomycin for treatment of suspected urinary tract infection.    Drug Allergies:   Review of patient's allergies indicates:   Allergen Reactions    Atorvastatin     Rosuvastatin        Actual Body Weight:   65.3 kg    Renal Function:   Estimated Creatinine Clearance: 11.1 mL/min (A) (based on SCr of 5.3 mg/dL (H)).    CBC (last 72 hours):  Recent Labs   Lab Result Units 02/25/25  0947 02/25/25  1655   WBC K/uL 4.32 3.13*   Hemoglobin g/dL 11.5* 11.5*   Hemoglobin A1C % 6.6*  --    Hematocrit % 37.3* 36.1*   Platelets K/uL 150 188   Gran % % 71.5 67.5   Lymph % % 16.0* 20.4   Mono % % 10.9 10.9   Eosinophil % % 0.2 0.3   Basophil % % 0.7 0.3   Differential Method  Automated Automated       Metabolic Panel (last 72 hours):  Recent Labs   Lab Result Units 02/25/25  0937 02/25/25  0947 02/25/25  1655 02/25/25  1944   Sodium mmol/L  --  137 135*  --    Potassium mmol/L  --  4.1 4.0  --    Chloride mmol/L  --  98 98  --    CO2 mmol/L  --  23 19*   "--    Glucose mg/dL  --  107 101  --    Glucose, UA  Negative  --   --  Negative   BUN mg/dL  --  95* 93*  --    Creatinine mg/dL  --  5.4* 5.3*  --    Creatinine, Urine mg/dL 89.0  --   --   --    Albumin g/dL  --  3.6 3.6  --    Total Bilirubin mg/dL  --   --  0.7  --    Alkaline Phosphatase U/L  --   --  146  --    AST U/L  --   --  83*  --    ALT U/L  --   --  73*  --    Phosphorus mg/dL  --  5.5*  --   --        Drug levels (last 3 results):  No results for input(s): "VANCOMYCINRA", "VANCORANDOM", "VANCOMYCINPE", "VANCOPEAK", "VANCOMYCINTR", "VANCOTROUGH" in the last 72 hours.    Microbiologic Results:  Microbiology Results (last 7 days)       Procedure Component Value Units Date/Time    Urine Culture High Risk [[id number]]     Order Status: Completed Specimen: Urine, Clean Catch     Urine Culture High Risk [[id number]]     Order Status: Canceled Specimen: Urine, Clean Catch     Influenza A & B by Molecular [[id number]] Collected: 02/25/25 1751    Order Status: Completed Specimen: Nasopharyngeal Swab Updated: 02/25/25 1925     Influenza A, Molecular Invalid     Influenza B, Molecular Invalid     Flu A & B Source Nasal swab    Blood culture x two cultures. Draw prior to antibiotics. [[id number]] Collected: 02/25/25 1655    Order Status: Sent Specimen: Blood from Peripheral, Antecubital, Left Updated: 02/25/25 1723    Blood culture x two cultures. Draw prior to antibiotics. [[id number]] Collected: 02/25/25 1655    Order Status: Sent Specimen: Blood from Peripheral, Forearm, Right Updated: 02/25/25 1723                 Moses Fishman MD  Department of Hospital Medicine  [facility] - Emergency Dept          "

## 2025-02-26 NOTE — ASSESSMENT & PLAN NOTE
Patient's blood pressure range in the last 24 hours was: BP  Min: 78/51  Max: 107/53.The patient's inpatient anti-hypertensive regimen is listed below:  Current Antihypertensives       Plan  - BP is uncontrolled, will adjust as follows: Hold anti HTN medications in the setting of hypotension

## 2025-02-26 NOTE — CONSULTS
Lukas[facility] Haro - Neurosurgery ([facility])  Nephrology  Consult Note    Patient Name: Jarrett Charlton Jr.  MRN: [id number]  Admission Date: 2/25/2025  Hospital Length of Stay: 0 days  Attending Provider: Jay Edmonds MD   Primary Care Physician: Tripp Mohan MD  Principal Problem:Acute renal failure superimposed on stage 4 chronic kidney disease    Inpatient consult to Nephrology  Consult performed by: Sean Sims MD  Consult ordered by: Moses Greer MD  Reason for consult: GRADY/CKD management, potential HD        Subjective:     HPI: Mr. Zoltan Farias is a 74 y.o male with a PMHx of CKD stage 4, CAD s/p PCI to mid-LAD in 2017, h/o VT, AT s/p AT ablation in 4/2024, DVT on Eliquis, CHF with EF of 35%, SBO s/p colostomy, HTN, HLD, and T2DM. [place]ed yesterday [place] [facility] [place] the evaluation of abnormal labs, which shows worsening kidney function. He endorses dizziness, hypotension and lightheadedness for quite sometime now. Recent urinary symptoms which made him to go to urgent care and was treated for UTI with Bactrim.    In the ED, he was hypotensive with a BP of 78/51, later 107/53 without intervention. Labs were remarkable for WBC 3.13, Hgb 11.5, Plts 188, CO2 19, BUN 93, Cr 5.3 (baseline 2.5), AST 83, ALT 73, , and Trop 43. Chest xray shows no acute abnormalities. CT Abd/Pelvis showed no obstructive uropathy and a pancreatic head/uncinate cystic lesion. The patient was started on vanc and zosyn for suspected urosepsis and admitted to hospital medicine for further management. ECHO performed 02/26 shows  concentric hypertrophy, severe global hypokinesis, moderately reduced systolic function with an EF of 30 - 35%.    Nephrology was consulted for management and potential HD    Past Medical History:   Diagnosis Date    Anticoagulant long-term use     Basal cell carcinoma     BPH (benign prostatic hyperplasia)     s/p TURP    Carotid stenosis     Chronic kidney disease     Claudication      DDD (degenerative disc disease) 10/21/2013    Diabetes mellitus with renal complications     Disc disease, degenerative, cervical     DVT (deep venous thrombosis)     Encounter for blood transfusion     GERD (gastroesophageal reflux disease)     Gout, chronic     History of ulcerative colitis     s/p colectomy and ileostomy    HLD (hyperlipidemia)     HTN (hypertension)     Ileostomy in place 1982    Paroxysmal atrial fibrillation     RBBB     Squamous cell carcinoma 03/08/2018    Left superior helix near insertion    Squamous cell carcinoma 04/12/2018    Left forearm x 5    Syncope 07/06/2024    Ventricular tachycardia        Past Surgical History:   Procedure Laterality Date    ABLATION, SVT, ACCESSORY PATHWAY N/A 4/30/2024    Procedure: Ablation, SVT, Accessory Pathway;  Surgeon: Franky Taylor MD;  Location: [facility] EP LAB;  Service: Cardiology;  Laterality: N/A;  VT, RFA, Carto, MAC, GP, 3 Prep *MDT ILR in situ*    cardiac stents      CATARACT EXTRACTION Bilateral     CERVICAL FUSION      CHOLECYSTECTOMY N/A 2/14/2023    Procedure: CHOLECYSTECTOMY;  Surgeon: Mike Joyce MD;  Location: [facility] OR;  Service: General;  Laterality: N/A;  very high probabilty of converison to open    colectomy and ileostomy  1985    ENDOSCOPIC ULTRASOUND OF UPPER GASTROINTESTINAL TRACT N/A 2/10/2023    Procedure: ULTRASOUND, UPPER GI TRACT, ENDOSCOPIC;  Surgeon: Poli Fuller MD;  Location: [facility] ENDO;  Service: Endoscopy;  Laterality: N/A;    ERCP N/A 2/10/2023    Procedure: ERCP (ENDOSCOPIC RETROGRADE CHOLANGIOPANCREATOGRAPHY);  Surgeon: Poli Fuller MD;  Location: [facility] ENDO;  Service: Endoscopy;  Laterality: N/A;    EXCISION OF PAROTID GLAND Left 12/18/2020    Procedure: EXCISION, PAROTID GLAND;  Surgeon: Michael Pinzon MD;  Location: [address] FLR;  Service: ENT;  Laterality: Left;    INSERTION OF IMPLANTABLE LOOP RECORDER  06/07/2021    INSERTION OF IMPLANTABLE LOOP RECORDER Left 6/7/2021    Procedure: INSERTION,  IMPLANTABLE LOOP RECORDER;  Surgeon: Romario Dao MD;  Location: [facility] CATH LAB;  Service: Cardiology;  Laterality: Left;    LEFT HEART CATHETERIZATION Right 4/15/2021    Procedure: CATHETERIZATION, HEART, LEFT;  Surgeon: Marcio Jones MD;  Location: [facility] CATH LAB;  Service: Cardiology;  Laterality: Right;    LYSIS OF ADHESIONS N/A 11/9/2020    Procedure: LYSIS, ADHESIONS,  ERAS low;  Surgeon: CED Haley MD;  Location: [facility] OR 2ND FLR;  Service: Colon and Rectal;  Laterality: N/A;    LYSIS OF ADHESIONS N/A 2/14/2023    Procedure: LYSIS, ADHESIONS;  Surgeon: Mike Joyce MD;  Location: [facility] OR;  Service: General;  Laterality: N/A;    POUCHOSCOPY N/A 4/6/2022    Procedure: ENDOSCOPY, POUCH, SMALL INTESTINE, DIAGNOSTIC;  Surgeon: Dieter Juraez MD;  Location: [facility] ENDO (2ND FLR);  Service: Endoscopy;  Laterality: N/A;    REPAIR, HERNIA, PARASTOMAL N/A 11/9/2020    Procedure: REPAIR, HERNIA, PARASTOMAL;  Surgeon: CED Haley MD;  Location: [facility] OR 2ND FLR;  Service: Colon and Rectal;  Laterality: N/A;    TRANSURETHRAL RESECTION OF PROSTATE (TURP) WITHOUT USE OF LASER N/A 1/23/2019    Procedure: TURP, WITHOUT USING LASER BIPOLAR;  Surgeon: Catarino Mota MD;  Location: [facility] OR 1ST FLR;  Service: Urology;  Laterality: N/A;  1.5 HOURS    TREATMENT OF CARDIAC ARRHYTHMIA  4/30/2024    Procedure: Cardioversion or Defibrillation;  Surgeon: Franky Taylor MD;  Location: [facility] EP LAB;  Service: Cardiology;;       Review of patient's allergies indicates:   Allergen Reactions    Atorvastatin     Rosuvastatin      Current Facility-Administered Medications   Medication Frequency    amiodarone tablet 200 mg Daily    apixaban tablet 5 mg BID    dextrose 50% injection 12.5 g PRN    dextrose 50% injection 25 g PRN    glucagon (human recombinant) injection 1 mg PRN    glucose chewable tablet 16 g PRN    glucose chewable tablet 24 g PRN    insulin aspart U-100 pen 0-5 Units QID (AC + HS) PRN    magnesium  sulfate 2g in water 50mL IVPB (premix) Once    metoprolol succinate (TOPROL-XL) 24 hr split tablet 12.5 mg Daily    midodrine tablet 10 mg TID WM    naloxone 0.4 mg/mL injection 0.02 mg PRN    pravastatin tablet 40 mg Daily    sodium chloride 0.9% bolus 250 mL 250 mL Once    sodium chloride 0.9% flush 10 mL Q12H PRN    tamsulosin 24 hr capsule 0.4 mg Daily     Facility-Administered Medications Ordered in Other Encounters   Medication Frequency    sodium chloride 0.9% in 1,000 mL infusion Continuous     Family History       Problem Relation (Age of Onset)    Cancer Father    Dementia Mother    Diabetes Father    Heart disease Father          Tobacco Use    Smoking status: Never     Passive exposure: Never    Smokeless tobacco: Never   Substance and Sexual Activity    Alcohol use: No    Drug use: No    Sexual activity: Not Currently     Partners: Female     Review of Systems  Objective:     Vital Signs (Most Recent):  Temp: 97.7 °F (36.5 °C) (02/26/25 1109)  Pulse: 85 (02/26/25 1114)  Resp: 18 (02/26/25 1109)  BP: (!) 78/39 (02/26/25 1114)  SpO2: (!) 94 % (02/26/25 1114) Vital Signs (24h Range):  Temp:  [97.5 °F (36.4 °C)-98.2 °F (36.8 °C)] 97.7 °F (36.5 °C)  Pulse:  [63-85] 85  Resp:  [16-20] 18  SpO2:  [94 %-100 %] 94 %  BP: ()/(39-77) 78/39     Weight: 64.4 kg (142 lb) (02/26/25 0806)  Body mass index is 18.23 kg/m².  Body surface area is 1.83 meters squared.    I/O last 3 completed shifts:  In: 600 [IV Piggyback:600]  Out: -      Physical Exam  Vitals and nursing note reviewed.   Constitutional:       Appearance: Normal appearance. He is ill-appearing.   HENT:      Nose: No congestion or rhinorrhea.   Cardiovascular:      Rate and Rhythm: Normal rate and regular rhythm.      Pulses: Normal pulses.      Heart sounds: Normal heart sounds.   Pulmonary:      Effort: Pulmonary effort is normal. No respiratory distress.      Breath sounds: Normal breath sounds. No wheezing.   Abdominal:      General: There is no  distension.      Tenderness: There is no abdominal tenderness.   Musculoskeletal:      Right lower leg: No edema.      Left lower leg: No edema.   Skin:     General: Skin is warm and dry.   Neurological:      Mental Status: He is alert and oriented to person, place, and time.   Psychiatric:         Mood and Affect: Mood normal.         Behavior: Behavior normal.          Significant Labs:  All labs within the past 24 hours have been reviewed.    Significant Imaging:     Assessment/Plan:     Renal/  * Acute renal failure superimposed on stage 4 chronic kidney disease  [place] yesterday [place] [facility] [place] the evaluation of abnormal labs, which shows worsening kidney function. He endorses dizziness, hypotension and lightheadedness for quite sometime now. Recent urinary symptoms which made him to go to urgent care and was treated for UTI with Bactrim. In the ED, he was hypotensive with a BP of 78/51, later 107/53 without intervention. Labs were remarkable for WBC 3.13, Hgb 11.5, Plts 188, CO2 19, BUN 93, Cr 5.3 (baseline 2.5), AST 83, ALT 73, , and Trop 43. Chest xray shows no acute abnormalities. CT Abd/Pelvis showed no obstructive uropathy and a pancreatic head/uncinate cystic lesion. The patient was started on vanc and zosyn for suspected urosepsis and admitted to hospital medicine for further management. ECHO performed 02/26 shows  concentric hypertrophy, severe global hypokinesis, moderately reduced systolic function with an EF of 30 - 35%.    Elevated creatinine likely d/o hypotension vs medication    Plan:   Lab Results   Component Value Date    CREATININE 5.6 (H) 02/26/2025     We discussed the likely need for HD in the future with the patient, but at the moment we will monitor closely.  - Retroperitoneal  US  - Urine sodium  - Encourage hydration  - Avoid nephrotoxic agents such as NSAIDs, gadolinium and IV radiocontrast.  - Renally dose meds to current GFR.  - Maintain MAP > 65.          Thank you for  your consult. I will follow-up with patient. Please contact us if you have any additional questions.    Sean Sims MD  Nephrology  Lukasz Tahir - Neurosurgery ([facility])

## 2025-02-26 NOTE — SUBJECTIVE & OBJECTIVE
Past Medical History:   Diagnosis Date    Anticoagulant long-term use     Basal cell carcinoma     BPH (benign prostatic hyperplasia)     s/p TURP    Carotid stenosis     Chronic kidney disease     Claudication     DDD (degenerative disc disease) 10/21/2013    Diabetes mellitus with renal complications     Disc disease, degenerative, cervical     DVT (deep venous thrombosis)     Encounter for blood transfusion     GERD (gastroesophageal reflux disease)     Gout, chronic     History of ulcerative colitis     s/p colectomy and ileostomy    HLD (hyperlipidemia)     HTN (hypertension)     Ileostomy in place 1982    Paroxysmal atrial fibrillation     RBBB     Squamous cell carcinoma 03/08/2018    Left superior helix near insertion    Squamous cell carcinoma 04/12/2018    Left forearm x 5    Syncope 07/06/2024    Ventricular tachycardia        Past Surgical History:   Procedure Laterality Date    ABLATION, SVT, ACCESSORY PATHWAY N/A 4/30/2024    Procedure: Ablation, SVT, Accessory Pathway;  Surgeon: Franky Taylor MD;  Location: [facility] EP LAB;  Service: Cardiology;  Laterality: N/A;  VT, RFA, Carto, MAC, GP, 3 Prep *MDT ILR in situ*    cardiac stents      CATARACT EXTRACTION Bilateral     CERVICAL FUSION      CHOLECYSTECTOMY N/A 2/14/2023    Procedure: CHOLECYSTECTOMY;  Surgeon: Mike Joyce MD;  Location: [facility] OR;  Service: General;  Laterality: N/A;  very high probabilty of converison to open    colectomy and ileostomy  1985    ENDOSCOPIC ULTRASOUND OF UPPER GASTROINTESTINAL TRACT N/A 2/10/2023    Procedure: ULTRASOUND, UPPER GI TRACT, ENDOSCOPIC;  Surgeon: Poli Fuller MD;  Location: [facility] ENDO;  Service: Endoscopy;  Laterality: N/A;    ERCP N/A 2/10/2023    Procedure: ERCP (ENDOSCOPIC RETROGRADE CHOLANGIOPANCREATOGRAPHY);  Surgeon: Poli Fuller MD;  Location: [facility] ENDO;  Service: Endoscopy;  Laterality: N/A;    EXCISION OF PAROTID GLAND Left 12/18/2020    Procedure: EXCISION, PAROTID GLAND;  Surgeon: Michael  BETTY Pinzon MD;  Location: [facility] OR 2ND FLR;  Service: ENT;  Laterality: Left;    INSERTION OF IMPLANTABLE LOOP RECORDER  06/07/2021    INSERTION OF IMPLANTABLE LOOP RECORDER Left 6/7/2021    Procedure: INSERTION, IMPLANTABLE LOOP RECORDER;  Surgeon: Romario Dao MD;  Location: [facility] CATH LAB;  Service: Cardiology;  Laterality: Left;    LEFT HEART CATHETERIZATION Right 4/15/2021    Procedure: CATHETERIZATION, HEART, LEFT;  Surgeon: Marcio Jones MD;  Location: [facility] CATH LAB;  Service: Cardiology;  Laterality: Right;    LYSIS OF ADHESIONS N/A 11/9/2020    Procedure: LYSIS, ADHESIONS,  ERAS low;  Surgeon: CED Haley MD;  Location: [facility] OR 2ND FLR;  Service: Colon and Rectal;  Laterality: N/A;    LYSIS OF ADHESIONS N/A 2/14/2023    Procedure: LYSIS, ADHESIONS;  Surgeon: Mike Joyce MD;  Location: [facility];  Service: General;  Laterality: N/A;    POUCHOSCOPY N/A 4/6/2022    Procedure: ENDOSCOPY, POUCH, SMALL INTESTINE, DIAGNOSTIC;  Surgeon: Dieter Juarez MD;  Location: [facility] ENDO (2ND FLR);  Service: Endoscopy;  Laterality: N/A;    REPAIR, HERNIA, PARASTOMAL N/A 11/9/2020    Procedure: REPAIR, HERNIA, PARASTOMAL;  Surgeon: CED Haley MD;  Location: [facility] OR 2ND FLR;  Service: Colon and Rectal;  Laterality: N/A;    TRANSURETHRAL RESECTION OF PROSTATE (TURP) WITHOUT USE OF LASER N/A 1/23/2019    Procedure: TURP, WITHOUT USING LASER BIPOLAR;  Surgeon: Catarino Mota MD;  Location: [facility] OR 1ST FLR;  Service: Urology;  Laterality: N/A;  1.5 HOURS    TREATMENT OF CARDIAC ARRHYTHMIA  4/30/2024    Procedure: Cardioversion or Defibrillation;  Surgeon: Franky Taylor MD;  Location: [facility] EP LAB;  Service: Cardiology;;       Review of patient's allergies indicates:   Allergen Reactions    Atorvastatin     Rosuvastatin        Current Facility-Administered Medications on File Prior to Encounter   Medication    leuprolide acetate (6 month) injection 45 mg    sodium chloride 0.9% in 1,000 mL infusion     [DISCONTINUED] 0.9% NaCl 100 mL flush bag    [DISCONTINUED] iron sucrose injection 100 mg    [DISCONTINUED] sodium chloride 0.9% flush 10 mL     Current Outpatient Medications on File Prior to Encounter   Medication Sig    ACCU-CHEK PAUL CONTROL SOLN Soln 1 drop by NOT APPLICABLE route once daily.    ACCU-CHEK SOFTCLIX LANCETS [facility] TEST BLOOD SUGAR THREE TIMES DAILY    albuterol (PROVENTIL) 2.5 mg /3 mL (0.083 %) nebulizer solution Take 3 mLs (2.5 mg total) by nebulization every 6 (six) hours as needed for Wheezing or Shortness of Breath. Rescue    alcohol swabs (DROPSAFE ALCOHOL PREP PADS) PadM Apply 1 each topically once daily.    allopurinoL (ZYLOPRIM) 100 MG tablet Take 4 tablets (400 mg total) by mouth once daily.    amiodarone (PACERONE) 200 MG Tab Take 1 tablet (200 mg total) by mouth once daily.    antiox.mv no.10/omeg3s/lut/gilma (I-CAPS ORAL) Take 1 capsule by mouth once daily.    apixaban (ELIQUIS) 5 mg Tab Take 1 tablet (5 mg total) by mouth 2 (two) times daily.    azelastine (ASTELIN) 137 mcg (0.1 %) nasal spray 1 spray (137 mcg total) by Nasal route 2 (two) times daily.    blood sugar diagnostic (ACCU-CHEK GUIDE TEST STRIPS) Strp 1 each by Other route 3 (three) times daily. Test Blood Sugar    blood-glucose meter (ACCU-CHEK GUIDE GLUCOSE METER) [facility] Accucheck BID    brimonidine 0.2% (ALPHAGAN) 0.2 % Drop Place 1 drop into both eyes 2 (two) times a day.    colchicine (COLCRYS) 0.6 mg tablet Take 1 tablet (0.6 mg total) by mouth every other day.    docusate sodium 100 mg capsule Take 100 mg by mouth 2 (two) times daily as needed for Constipation.    ferrous sulfate (IRON) 325 mg (65 mg iron) Tab tablet Take 1 tablet (325 mg total) by mouth every other day.    glimepiride (AMARYL) 4 MG tablet TAKE 1 TABLET TWICE DAILY BEFORE MEALS    hydrOXYzine HCL (ATARAX) 25 MG tablet Take 1 tablet (25 mg total) by mouth 3 (three) times daily as needed for Itching.    metoprolol succinate (TOPROL-XL) 25 MG 24 hr tablet  Take 1 tablet (25 mg total) by mouth once daily.    midodrine (PROAMATINE) 2.5 MG Tab Take 1 tablet (2.5 mg total) by mouth 2 (two) times daily with meals.    pantoprazole (PROTONIX) 40 MG tablet TAKE 1 TABLET ONE TIME DAILY    pravastatin (PRAVACHOL) 40 MG tablet Take 1 tablet (40 mg total) by mouth once daily.    predniSONE (DELTASONE) 2.5 MG tablet Take 2.5 mg by mouth every other day.    sodium bicarbonate 650 MG tablet Take 2 tablets (1,300 mg total) by mouth 2 (two) times daily.    tamsulosin (FLOMAX) 0.4 mg Cap Take 1 capsule (0.4 mg total) by mouth once daily.    torsemide (DEMADEX) 20 MG Tab Take 1 tablet (20 mg total) by mouth once daily.     Family History       Problem Relation (Age of Onset)    Cancer Father    Dementia Mother    Diabetes Father    Heart disease Father          Tobacco Use    Smoking status: Never     Passive exposure: Never    Smokeless tobacco: Never   Substance and Sexual Activity    Alcohol use: No    Drug use: No    Sexual activity: Not Currently     Partners: Female     Review of Systems   Constitutional:  Positive for fatigue. Negative for appetite change, chills, diaphoresis and fever.   HENT:  Negative for congestion.    Respiratory:  Negative for cough, shortness of breath and wheezing.    Cardiovascular:  Negative for chest pain, palpitations and leg swelling.   Gastrointestinal:  Negative for abdominal pain, blood in stool, constipation, diarrhea, nausea and vomiting.   Genitourinary:  Positive for difficulty urinating, dysuria and frequency. Negative for flank pain.   Musculoskeletal:  Negative for arthralgias, joint swelling and myalgias.   Skin:  Negative for pallor and rash.   Neurological:  Positive for dizziness and light-headedness. Negative for headaches.   Psychiatric/Behavioral:  Negative for agitation. The patient is not nervous/anxious.      Objective:     Vital Signs (Most Recent):  Temp: 98 °F (36.7 °C) (02/25/25 1917)  Pulse: 65 (02/25/25 1917)  Resp: 20  (02/25/25 1917)  BP: (!) 107/53 (02/25/25 1917)  SpO2: 100 % (02/25/25 1917) Vital Signs (24h Range):  Temp:  [97.8 °F (36.6 °C)-98 °F (36.7 °C)] 98 °F (36.7 °C)  Pulse:  [63-74] 65  Resp:  [16-20] 20  SpO2:  [96 %-100 %] 100 %  BP: ()/(48-62) 107/53     Weight: 65.3 kg (144 lb)  Body mass index is 18.49 kg/m².     Physical Exam  Constitutional:       General: He is not in acute distress.     Appearance: Normal appearance. He is ill-appearing.   HENT:      Head: Normocephalic and atraumatic.      Nose: Nose normal.   Eyes:      Extraocular Movements: Extraocular movements intact.      Pupils: Pupils are equal, round, and reactive to light.   Cardiovascular:      Rate and Rhythm: Normal rate and regular rhythm.      Heart sounds: No murmur heard.  Pulmonary:      Effort: Pulmonary effort is normal. No respiratory distress.      Breath sounds: No wheezing or rales.   Chest:      Chest wall: No tenderness.   Abdominal:      General: Abdomen is flat. Bowel sounds are normal. There is no distension.      Palpations: Abdomen is soft.      Tenderness: There is no abdominal tenderness.      Comments: Ostomy with some stool output   Musculoskeletal:         General: No swelling or tenderness. Normal range of motion.      Cervical back: Normal range of motion.      Right lower leg: Edema present.      Left lower leg: Edema present.   Lymphadenopathy:      Cervical: No cervical adenopathy.   Skin:     General: Skin is warm and dry.      Coloration: Skin is not jaundiced.      Findings: No bruising.   Neurological:      General: No focal deficit present.      Mental Status: He is alert and oriented to person, place, and time.   Psychiatric:         Mood and Affect: Mood normal.         Behavior: Behavior normal.              CRANIAL NERVES     CN III, IV, VI   Pupils are equal, round, and reactive to light.       Significant Labs: All pertinent labs within the past 24 hours have been reviewed.  CBC:   Recent Labs   Lab  02/25/25  0947 02/25/25  1655 02/25/25  1708   WBC 4.32 3.13*  --    HGB 11.5* 11.5*  --    HCT 37.3* 36.1* 37.2    188  --      CMP:   Recent Labs   Lab 02/25/25  0947 02/25/25  1655    135*   K 4.1 4.0   CL 98 98   CO2 23 19*    101   BUN 95* 93*   CREATININE 5.4* 5.3*   CALCIUM 8.5* 8.6*   PROT  --  6.7   ALBUMIN 3.6 3.6   BILITOT  --  0.7   ALKPHOS  --  146   AST  --  83*   ALT  --  73*   ANIONGAP 16 18*       Significant Imaging: I have reviewed all pertinent imaging results/findings within the past 24 hours.

## 2025-02-26 NOTE — ED NOTES
Telemetry Verification   Patient placed on Telemetry Box  Verified with War Room  Box # [id number]   Monitor Tech    Rate 79   Rhythm Sinus rhythm

## 2025-02-26 NOTE — ASSESSMENT & PLAN NOTE
Pt presenting with hypotension. Trop elevated to 43. EKG negative for ST elevation. Pt denies any chest pain. Likely to be demand ischemia in the setting of hypotension.       Echo  Result Date: 1/24/2025    Left Ventricle: The left ventricle is mildly dilated. Ventricular mass   is normal. Normal wall thickness. Severe global hypokinesis present. There   is severely reduced systolic function. Ejection fraction is approximately   30%. Grade I diastolic dysfunction.    Right Ventricle: Normal right ventricular cavity size. Wall thickness   is normal. Right ventricle wall motion has global hypokinesis. Systolic   function is moderately reduced.    Left Atrium: Left atrium is severely dilated.    Right Atrium: Right atrium is moderately dilated.    Aortic Valve: The aortic valve is a trileaflet valve. There is mild   aortic valve sclerosis. There is mild aortic regurgitation with a   centrally directed jet.    Mitral Valve: There is severe regurgitation with a centrally directed   jet.    Tricuspid Valve: There is moderate to severe regurgitation with a   centrally directed jet.    Pulmonary Artery: There is mild to moderate pulmonary hypertension. The   estimated pulmonary artery systolic pressure is 42 mmHg.    IVC/SVC: Elevated venous pressure at 15 mmHg.    Pericardium: There is a small circumferential effusion. Left pleural   effusion.    - ECHO  - Trend Trop  - Cardiology consult if uptrending, chest pain or EKG changes

## 2025-02-26 NOTE — NURSING
Patient Transferred to NPU Room 911 @ 4082      Upon arrival to the floor, patient greeted and oriented to room. Complete head to toe assessment completed per protocol. VSS, see flowsheet for details. Neuro assessment completed. Primary team notified of patient's transfer to floor. All current and transfer orders reviewed/reconciled per protocol. All emergency equipment set up in patient's room. Fall/seizure precautions initiated per providers orders. 4 Eyes skin assessment performed, see flowsheets for details. Reviewed assessment and rounding frequency with patient and family. Questions were encouraged and addressed. Repositioned patient for comfort with bed locked in lowest position, side rails up x 3, bed alarm set, and call light within reach. Instructed patient to call staff for mobility/assistance, verbalized understanding. No acute signs of distress noted at this time.

## 2025-02-26 NOTE — HPI
Mr. Zoltan Farias is a 74 y.o male with a PMHx of CKD stage 4, CAD s/p PCI to mid-LAD in 2017, h/o VT, AT s/p AT ablation in 4/2024, DVT on Eliquis, CHF with EF of 35%, SBO s/p colostomy, HTN, HLD, and T2DM. [place] [facility] [place] evaluation of abnormal labs, which shows worsening kidney function. He endorses dizziness, hypotension and lightheadedness for quite sometime now. Recent urinary symptoms which made him to go to urgent care and was treated for UTI with Bactrim.    In the ED, he was hypotensive with a BP of 78/51, later 107/53 without intervention. Labs were remarkable for WBC 3.13, Hgb 11.5, Plts 188, CO2 19, BUN 93, Cr 5.3 (baseline 2.5), AST 83, ALT 73, , and Trop 43. Chest xray shows no acute abnormalities. CT Abd/Pelvis showed no obstructive uropathy and a pancreatic head/uncinate cystic lesion. The patient was started on vanc and zosyn for suspected urosepsis and admitted to hospital medicine for further management. ECHO performed 02/26 shows  concentric hypertrophy, severe global hypokinesis, moderately reduced systolic function with an EF of 30 - 35%.    Nephrology was consulted for management and potential HD

## 2025-02-26 NOTE — ASSESSMENT & PLAN NOTE
Body pain since Sunday. Had a negative covid swab. Now pain is more in bilateral rib/back area, worse on the left than the right. Feels like its hard to breath, worse with exertion. Taking ibuprofen without relief. Hx of kidney stones, but feels a little different.    [place] [facility] [place] of abnormal labs, which shows worsening kidney function. He endorses dizziness, hypotension and lightheadedness for quite sometime now. Recent urinary symptoms which made him to go to urgent care and was treated for UTI with Bactrim. In the ED, he was hypotensive with a BP of 78/51, later 107/53 without intervention. Labs were remarkable for WBC 3.13, Hgb 11.5, Plts 188, CO2 19, BUN 93, Cr 5.3 (baseline 2.5), AST 83, ALT 73, , and Trop 43. Chest xray shows no acute abnormalities. CT Abd/Pelvis showed no obstructive uropathy and a pancreatic head/uncinate cystic lesion. The patient was started on vanc and zosyn for suspected urosepsis and admitted to hospital medicine for further management. ECHO performed 02/26 shows  concentric hypertrophy, severe global hypokinesis, moderately reduced systolic function with an EF of 30 - 35%.    Elevated creatinine likely d/o hypotension vs medication    Plan:   Lab Results   Component Value Date    CREATININE 5.6 (H) 02/26/2025     We discussed the likely need for HD in the future with the patient, but at the moment we will monitor closely.  - Retroperitoneal  US  - Urine sodium  - Encourage hydration  - Avoid nephrotoxic agents such as NSAIDs, gadolinium and IV radiocontrast.  - Renally dose meds to current GFR.  - Maintain MAP > 65.

## 2025-02-26 NOTE — PLAN OF CARE
[facility] - Emergency Dept  Discharge Assessment    Primary Care Provider: Tripp Mohan MD     Discharge Assessment (most recent)       BRIEF DISCHARGE ASSESSMENT - 02/25/25 0790          Discharge Planning    Assessment Type Discharge Planning Assessment (P)      Resource/Environmental Concerns none (P)      Support Systems Children (P)      Equipment Currently Used at Home none (P)      Current Living Arrangements home (P)      Patient/Family Anticipates Transition to home (P)      Patient/Family Anticipated Services at Transition none (P)      DME Needed Upon Discharge  none (P)      Discharge Plan A Home Health (P)      Discharge Plan B Home Health (P)    pt has established home health       Physical Activity    On average, how many days per week do you engage in moderate to strenuous exercise (like a brisk walk)? 3 days (P)      On average, how many minutes do you engage in exercise at this level? 30 min (P)         Financial Resource Strain    How hard is it for you to pay for the very basics like food, housing, medical care, and heating? Not very hard (P)         Housing Stability    In the last 12 months, was there a time when you were not able to pay the mortgage or rent on time? No (P)      At any time in the past 12 months, were you homeless or living in a shelter (including now)? No (P)         Transportation Needs    Has the lack of transportation kept you from medical appointments, meetings, work or from getting things needed for daily living? No (P)         Food Insecurity    Within the past 12 months, you worried that your food would run out before you got the money to buy more. Never true (P)      Within the past 12 months, the food you bought just didn't last and you didn't have money to get more. Never true (P)         Stress    Do you feel stress - tense, restless, nervous, or anxious, or unable to sleep at night because your mind is troubled all the time - these days? To some extent (P)          Social Isolation    How often do you feel lonely or isolated from those around you?  Never (P)         Alcohol Use    Q1: How often do you have a drink containing alcohol? Never (P)      Q2: How many drinks containing alcohol do you have on a typical day when you are drinking? Patient does not drink (P)      Q3: How often do you have six or more drinks on one occasion? Never (P)         Utilities    In the past 12 months has the electric, gas, oil, or water company threatened to shut off services in your home? No (P)         Health Literacy    How often do you need to have someone help you when you read instructions, pamphlets, or other written material from your doctor or pharmacy? Rarely (P)                       Sw met pt at bedside. Pt stated he has home health but they have not started a regular. Pt has no acute case management needs at this time.        [facility], ALONSO  Case Management  Emergency Department  [phone]

## 2025-02-26 NOTE — ASSESSMENT & PLAN NOTE
GRADY is likely due to acute tubular necrosis caused by hypotension . Baseline creatinine is  2.4 . Most recent creatinine and eGFR are listed below.  Recent Labs     02/25/25  0947 02/25/25  1655   CREATININE 5.4* 5.3*   EGFRNORACEVR 10.4* 10.6*      Plan  - GRADY is worsening. Will continue current treatment  - Avoid nephrotoxins and renally dose meds for GFR listed above  - Monitor urine output, serial BMP, and adjust therapy as needed  - Nephrology consulted, appreciate recs

## 2025-02-26 NOTE — PLAN OF CARE
Problem: Adult Inpatient Plan of Care  Goal: Plan of Care Review  Outcome: Progressing  Goal: Patient-Specific Goal (Individualized)  Outcome: Progressing  Goal: Absence of Hospital-Acquired Illness or Injury  Outcome: Progressing  Goal: Optimal Comfort and Wellbeing  Outcome: Progressing  Goal: Readiness for Transition of Care  Outcome: Progressing     Problem: Diabetes Comorbidity  Goal: Blood Glucose Level Within Targeted Range  Outcome: Progressing     Problem: Sepsis/Septic Shock  Goal: Optimal Coping  Outcome: Progressing  Goal: Absence of Bleeding  Outcome: Progressing  Goal: Blood Glucose Level Within Targeted Range  Outcome: Progressing  Goal: Absence of Infection Signs and Symptoms  Outcome: Progressing  Goal: Optimal Nutrition Intake  Outcome: Progressing     Problem: Acute Kidney Injury/Impairment  Goal: Fluid and Electrolyte Balance  Outcome: Progressing  Goal: Improved Oral Intake  Outcome: Progressing  Goal: Effective Renal Function  Outcome: Progressing     Problem: Wound  Goal: Optimal Coping  Outcome: Progressing  Goal: Optimal Functional Ability  Outcome: Progressing  Goal: Absence of Infection Signs and Symptoms  Outcome: Progressing  Goal: Improved Oral Intake  Outcome: Progressing  Goal: Optimal Pain Control and Function  Outcome: Progressing  Goal: Skin Health and Integrity  Outcome: Progressing  Goal: Optimal Wound Healing  Outcome: Progressing     Problem: Fall Injury Risk  Goal: Absence of Fall and Fall-Related Injury  Outcome: Progressing

## 2025-02-27 PROBLEM — Z93.2 ILEOSTOMY PRESENT: Status: ACTIVE | Noted: 2022-03-09

## 2025-02-27 PROBLEM — D68.69 HYPERCOAGULABLE STATE DUE TO PAROXYSMAL ATRIAL FIBRILLATION: Status: ACTIVE | Noted: 2025-02-26

## 2025-02-27 PROBLEM — J10.1 INFLUENZA A: Status: ACTIVE | Noted: 2025-02-27

## 2025-02-27 PROBLEM — I50.42 CHRONIC COMBINED SYSTOLIC AND DIASTOLIC HEART FAILURE: Status: ACTIVE | Noted: 2024-08-09

## 2025-02-27 PROBLEM — I82.509 CHRONIC DEEP VEIN THROMBOSIS (DVT): Status: ACTIVE | Noted: 2024-09-24

## 2025-02-27 PROBLEM — K76.0 HEPATIC STEATOSIS: Status: ACTIVE | Noted: 2025-02-27

## 2025-02-27 PROBLEM — E83.42 HYPOMAGNESEMIA: Status: ACTIVE | Noted: 2025-02-27

## 2025-02-27 PROBLEM — I48.0 HYPERCOAGULABLE STATE DUE TO PAROXYSMAL ATRIAL FIBRILLATION: Status: ACTIVE | Noted: 2025-02-26

## 2025-02-27 LAB
ALBUMIN SERPL BCP-MCNC: 2.9 G/DL (ref 3.5–5.2)
ALP SERPL-CCNC: 115 U/L (ref 40–150)
ALT SERPL W/O P-5'-P-CCNC: 84 U/L (ref 10–44)
ANION GAP SERPL CALC-SCNC: 15 MMOL/L (ref 8–16)
AST SERPL-CCNC: 86 U/L (ref 10–40)
BACTERIA UR CULT: NO GROWTH
BASOPHILS # BLD AUTO: 0.01 K/UL (ref 0–0.2)
BASOPHILS NFR BLD: 0.3 % (ref 0–1.9)
BILIRUB SERPL-MCNC: 0.6 MG/DL (ref 0.1–1)
BUN SERPL-MCNC: 92 MG/DL (ref 8–23)
CALCIUM SERPL-MCNC: 8.1 MG/DL (ref 8.7–10.5)
CHLORIDE SERPL-SCNC: 101 MMOL/L (ref 95–110)
CO2 SERPL-SCNC: 19 MMOL/L (ref 23–29)
CREAT SERPL-MCNC: 5.1 MG/DL (ref 0.5–1.4)
DIFFERENTIAL METHOD BLD: ABNORMAL
EOSINOPHIL # BLD AUTO: 0.1 K/UL (ref 0–0.5)
EOSINOPHIL NFR BLD: 2.3 % (ref 0–8)
ERYTHROCYTE [DISTWIDTH] IN BLOOD BY AUTOMATED COUNT: 18.9 % (ref 11.5–14.5)
EST. GFR  (NO RACE VARIABLE): 11.1 ML/MIN/1.73 M^2
GLUCOSE SERPL-MCNC: 89 MG/DL (ref 70–110)
HCT VFR BLD AUTO: 32 % (ref 40–54)
HGB BLD-MCNC: 10.1 G/DL (ref 14–18)
IMM GRANULOCYTES # BLD AUTO: 0.01 K/UL (ref 0–0.04)
IMM GRANULOCYTES NFR BLD AUTO: 0.3 % (ref 0–0.5)
LYMPHOCYTES # BLD AUTO: 0.9 K/UL (ref 1–4.8)
LYMPHOCYTES NFR BLD: 28.4 % (ref 18–48)
MAGNESIUM SERPL-MCNC: 1.7 MG/DL (ref 1.6–2.6)
MCH RBC QN AUTO: 27.3 PG (ref 27–31)
MCHC RBC AUTO-ENTMCNC: 31.6 G/DL (ref 32–36)
MCV RBC AUTO: 87 FL (ref 82–98)
MONOCYTES # BLD AUTO: 0.2 K/UL (ref 0.3–1)
MONOCYTES NFR BLD: 7.8 % (ref 4–15)
NEUTROPHILS # BLD AUTO: 1.9 K/UL (ref 1.8–7.7)
NEUTROPHILS NFR BLD: 60.9 % (ref 38–73)
NRBC BLD-RTO: 0 /100 WBC
PHOSPHATE SERPL-MCNC: 5.2 MG/DL (ref 2.7–4.5)
PLATELET # BLD AUTO: 105 K/UL (ref 150–450)
PMV BLD AUTO: ABNORMAL FL (ref 9.2–12.9)
POCT GLUCOSE: 119 MG/DL (ref 70–110)
POCT GLUCOSE: 138 MG/DL (ref 70–110)
POCT GLUCOSE: 151 MG/DL (ref 70–110)
POCT GLUCOSE: 188 MG/DL (ref 70–110)
POTASSIUM SERPL-SCNC: 3.7 MMOL/L (ref 3.5–5.1)
PROT SERPL-MCNC: 5.5 G/DL (ref 6–8.4)
RBC # BLD AUTO: 3.7 M/UL (ref 4.6–6.2)
SODIUM SERPL-SCNC: 135 MMOL/L (ref 136–145)
WBC # BLD AUTO: 3.06 K/UL (ref 3.9–12.7)

## 2025-02-27 PROCEDURE — 99233 SBSQ HOSP IP/OBS HIGH 50: CPT | Mod: HCNC,GC,, | Performed by: STUDENT IN AN ORGANIZED HEALTH CARE EDUCATION/TRAINING PROGRAM

## 2025-02-27 PROCEDURE — 25000003 PHARM REV CODE 250: Mod: HCNC

## 2025-02-27 PROCEDURE — 36415 COLL VENOUS BLD VENIPUNCTURE: CPT | Mod: HCNC

## 2025-02-27 PROCEDURE — 83735 ASSAY OF MAGNESIUM: CPT | Mod: HCNC

## 2025-02-27 PROCEDURE — 84100 ASSAY OF PHOSPHORUS: CPT | Mod: HCNC

## 2025-02-27 PROCEDURE — 11000001 HC ACUTE MED/SURG PRIVATE ROOM: Mod: HCNC

## 2025-02-27 PROCEDURE — 63600175 PHARM REV CODE 636 W HCPCS: Mod: HCNC

## 2025-02-27 PROCEDURE — 80053 COMPREHEN METABOLIC PANEL: CPT | Mod: HCNC

## 2025-02-27 PROCEDURE — 27000207 HC ISOLATION: Mod: HCNC

## 2025-02-27 PROCEDURE — 85025 COMPLETE CBC W/AUTO DIFF WBC: CPT | Mod: HCNC

## 2025-02-27 RX ORDER — POTASSIUM CHLORIDE 750 MG/1
30 CAPSULE, EXTENDED RELEASE ORAL ONCE
Status: COMPLETED | OUTPATIENT
Start: 2025-02-27 | End: 2025-02-27

## 2025-02-27 RX ORDER — ACETAMINOPHEN 500 MG
1000 TABLET ORAL EVERY 8 HOURS PRN
Status: DISCONTINUED | OUTPATIENT
Start: 2025-02-27 | End: 2025-03-04 | Stop reason: HOSPADM

## 2025-02-27 RX ORDER — ELECTROLYTES/DEXTROSE
200 SOLUTION, ORAL ORAL
Status: DISCONTINUED | OUTPATIENT
Start: 2025-02-27 | End: 2025-03-01

## 2025-02-27 RX ORDER — OSELTAMIVIR PHOSPHATE 30 MG/1
30 CAPSULE ORAL DAILY
Status: COMPLETED | OUTPATIENT
Start: 2025-02-27 | End: 2025-03-02

## 2025-02-27 RX ORDER — ASPIRIN 81 MG/1
81 TABLET ORAL DAILY
Status: ON HOLD | COMMUNITY

## 2025-02-27 RX ORDER — MAGNESIUM SULFATE 1 G/100ML
1 INJECTION INTRAVENOUS ONCE
Status: COMPLETED | OUTPATIENT
Start: 2025-02-27 | End: 2025-02-27

## 2025-02-27 RX ADMIN — PRAVASTATIN SODIUM 40 MG: 40 TABLET ORAL at 08:02

## 2025-02-27 RX ADMIN — APIXABAN 5 MG: 5 TABLET, FILM COATED ORAL at 08:02

## 2025-02-27 RX ADMIN — BRIMONIDINE TARTRATE 1 DROP: 2 SOLUTION OPHTHALMIC at 09:02

## 2025-02-27 RX ADMIN — BRIMONIDINE TARTRATE 1 DROP: 2 SOLUTION OPHTHALMIC at 08:02

## 2025-02-27 RX ADMIN — APIXABAN 5 MG: 5 TABLET, FILM COATED ORAL at 09:02

## 2025-02-27 RX ADMIN — Medication 200 ML: at 11:02

## 2025-02-27 RX ADMIN — Medication 200 ML: at 09:02

## 2025-02-27 RX ADMIN — ACETAMINOPHEN 1000 MG: 500 TABLET ORAL at 11:02

## 2025-02-27 RX ADMIN — MIDODRINE HYDROCHLORIDE 10 MG: 5 TABLET ORAL at 05:02

## 2025-02-27 RX ADMIN — OSELTAMIVIR PHOSPHATE 30 MG: 30 CAPSULE ORAL at 09:02

## 2025-02-27 RX ADMIN — Medication 200 ML: at 05:02

## 2025-02-27 RX ADMIN — TAMSULOSIN HYDROCHLORIDE 0.4 MG: 0.4 CAPSULE ORAL at 08:02

## 2025-02-27 RX ADMIN — POTASSIUM CHLORIDE 30 MEQ: 750 CAPSULE, EXTENDED RELEASE ORAL at 08:02

## 2025-02-27 RX ADMIN — MIDODRINE HYDROCHLORIDE 10 MG: 5 TABLET ORAL at 11:02

## 2025-02-27 RX ADMIN — AMIODARONE HYDROCHLORIDE 200 MG: 200 TABLET ORAL at 08:02

## 2025-02-27 RX ADMIN — MIDODRINE HYDROCHLORIDE 10 MG: 5 TABLET ORAL at 08:02

## 2025-02-27 RX ADMIN — MAGNESIUM SULFATE 1 G: 500 INJECTION, SOLUTION INTRAMUSCULAR; INTRAVENOUS at 08:02

## 2025-02-27 NOTE — ASSESSMENT & PLAN NOTE
Patient has Combined Systolic and Diastolic heart failure that is Chronic. On presentation their CHF was well compensated. Most recent BNP and echo results are listed below.  Recent Labs     02/25/25  1655   *     Latest ECHO  Results for orders placed during the hospital encounter of 02/25/25    Echo    Interpretation Summary    Left Ventricle: The left ventricle is normal in size. Normal wall thickness. There is concentric hypertrophy. Severe global hypokinesis present. There is moderately reduced systolic function with a visually estimated ejection fraction of 30 - 35%. Quantitated ejection fraction is 30%. There is indeterminate diastolic function. No thrombus.    Right Ventricle: The right ventricle is normal in size. Wall thickness is normal. Systolic function is normal.    Left Atrium: The left atrium is mildly dilated.    Mitral Valve: There is mild to moderate regurgitation with a centrally directed jet.    Tricuspid Valve: There is mild regurgitation with a centrally directed jet.    Pulmonary Artery: The estimated pulmonary artery systolic pressure is 33 mmHg.    IVC/SVC: Intermediate venous pressure at 8 mmHg.    Current Heart Failure Medications  metoprolol succinate (TOPROL-XL) 24 hr split tablet 12.5 mg, Daily, Oral    Plan  - Monitor strict I&Os and daily weights.    - Place on telemetry  - Low sodium diet  - Place on fluid restriction of 1.5 L.   - Cardiology has not been consulted  - The patient's volume status is at their baseline

## 2025-02-27 NOTE — PHARMACY MED REC
"      Admission Medication History     The home medication history was taken by Amanda Webster.    You may go to "Admission" then "Reconcile Home Medications" tabs to review and/or act upon these items.     The home medication list has been updated by the Pharmacy department.   Please read ALL comments highlighted in yellow.   Please address this information as you see fit.    Feel free to contact us if you have any questions or require assistance.      The medications listed below were removed from the home medication list. Please reorder if appropriate:  Patient reports no longer taking the following medication(s):  Antiox.mv no.10/[id number]/lut/gilma (I-CAPs)  Docusate 100 mg      Medications listed below were obtained from: Patient/family and Analytic software- [employer]      Medication Sig    albuterol (PROVENTIL) 2.5 mg /3 mL (0.083 %) nebulizer solution Take 3 mLs (2.5 mg total) by nebulization every 6 (six) hours as needed for Wheezing or Shortness of Breath. Rescue  Patient has not started yet.      allopurinoL (ZYLOPRIM) 100 MG tablet   Take 4 tablets (400 mg total) by mouth once daily.    amiodarone (PACERONE) 200 MG Tab   Take 1 tablet (200 mg total) by mouth once daily.    apixaban (ELIQUIS) 5 mg Tab   Take 1 tablet (5 mg total) by mouth 2 (two) times daily.    aspirin (ECOTRIN) 81 MG EC tablet   Take 81 mg by mouth once daily.    azelastine (ASTELIN) 137 mcg (0.1 %) nasal spray 1 spray by Nasal route 2 (two) times daily.   Patient has not started yet.      brimonidine 0.2% (ALPHAGAN) 0.2 % Drop Place 1 drop into both eyes 2 (two) times a day.  Patient takes every morning and every night.      colchicine (COLCRYS) 0.6 mg tablet   Take 1 tablet (0.6 mg total) by mouth every other day.    ferrous sulfate (IRON) 325 mg (65 mg iron) Tab tablet   Take 1 tablet (325 mg total) by mouth every other day.    glimepiride (AMARYL) 4 MG tablet Take 1 tablet twice daily before meals.    hydrOXYzine HCL (ATARAX) 25 MG tablet " Take 1 tablet (25 mg total) by mouth 2 (two) times daily as needed for Itching       leuprolide acetate, 6 month, (LUPRON) 45 mg SyKt injection   Inject 45 mg into the muscle every 6 (six) months. for 4 doses    metoprolol succinate (TOPROL-XL) 25 MG 24 hr tablet Take 1 tablet (25 mg total) by mouth once daily.    midodrine (PROAMATINE) 2.5 MG Tab Take 1 tablet (2.5 mg) by mouth Daily.   Patient takes daily with meals.      pantoprazole (PROTONIX) 40 MG tablet   Take 1 tablet one time daily.    pravastatin (PRAVACHOL) 40 MG tablet   Take 1 tablet (40 mg total) by mouth once daily.    predniSONE (DELTASONE) 2.5 MG tablet   Take 2.5 mg by mouth every other day.    sodium bicarbonate 650 MG tablet   Take 2 tablets (1,300 mg total) by mouth 2 (two) times daily.    tamsulosin (FLOMAX) 0.4 mg Cap   Take 0.4 mg by mouth every morning.    torsemide (DEMADEX) 20 MG Tab   Take 1 tablet (20 mg total) by mouth once daily.    vit A/C/E ac/ZnOx/cupric oxide (EYE VITAMIN AND MINERALS ORAL)   Take 1 tablet by mouth every morning.    ACCU-CHEK PAUL   CONTROL SOLN Soln 1 drop by NOT APPLICABLE route once daily.    ACCU-CHEK SOFTCLIX LANCETS [facility] [place]   TEST BLOOD SUGAR THREE TIMES DAILY    alcohol swabs (DROPSAFE   ALCOHOL PREP PADS) PadM   Apply 1 each topically once daily.    blood sugar diagnostic (ACCU-CHEK GUIDE TEST STRIPS) Strp   1 each by Other route 3 (three) times daily. Test Blood Sugar    blood-glucose meter (ACCU-CHEK GUIDE GLUCOSE METER) [facility]   Accucheck BID           Ally Novel  EXT [id number]           .

## 2025-02-27 NOTE — HOSPITAL COURSE
Patient presenting with worsening renal function and hypotension. OP provider recently reduced midodrine dose. Restarted patient on Midodrine 10 TID. Lowered Toprol dose to 12.5 QD. Nephrology following for GRADY on CKD, likely intrinsic ATN in setting of hypotension. Patient tested positive for Flu A and started on 5 day course of Tamiflu. Encouraging oral hydration with Pedialyte. Supplementing with IVF cautiously given his CHF status.

## 2025-02-27 NOTE — ASSESSMENT & PLAN NOTE
Patient has Abnormal Magnesium: hypomagnesemia. Will continue to monitor electrolytes closely. Will replace the affected electrolytes and repeat labs to be done after interventions completed. The patient's magnesium results have been reviewed and are listed below.  Recent Labs   Lab 02/27/25  0355   MG 1.7

## 2025-02-27 NOTE — ASSESSMENT & PLAN NOTE
This patient does have evidence of infective focus  My overall impression is sepsis.  Source: Respiratory  Antibiotics given-   Antibiotics (72h ago, onward)      None          Latest lactate reviewed-  Recent Labs   Lab 02/25/25  1708   POCLAC 1.5       Organ dysfunction indicated by Acute kidney injury    Fluid challenge Contraindicated- Fluid bolus is contraindicated in this patient due to Congestive Heart Failure     Plan:  - Flu A positive  - On Tamiflu x5 days

## 2025-02-27 NOTE — ASSESSMENT & PLAN NOTE
Patient presents with hypotension. On exam, the patient was noted to be normal heart rate, with adequate perfusion, and no signs of shock. Pt reported dizziness, lightheadedness. Blood pressure normalized without any intervention and further monitoring was initiated. Differential diagnosis includes sepsis and heart failure exacerbation.     - Midodrine 10 TID  - Removed fluid restriction, encourage oral hydration  - Pedialyte 200cc q4h  - Strict intake/output

## 2025-02-27 NOTE — PROGRESS NOTES
Lukasz Haro - Neurosurgery ([facility])  [facility] Medicine  Progress Note    Patient Name: Jarrett Charlton Jr.  MRN: [id number]  Patient Class: IP- Inpatient   Admission Date: 2/25/2025  Length of Stay: 1 days  Attending Physician: Brandon Mccormack MD  Primary Care Provider: Tripp Mohan MD        Subjective     Principal Problem:Acute renal failure superimposed on stage 4 chronic kidney disease        HPI:  Mr. Charlton is a 74-year-old male with a complex medical history, including CKD stage 4, CAD s/p PCI to mid-LAD in 2017, h/o VT, AT s/p AT ablation in 4/2024, DVT on Eliquis, CHF with EF of 35%, SBO s/p colostomy, HTN, HLD, and DM2. He presented to the ED for evaluation of abnormal labs, which showed worsening renal function. The patient reports a week-long history of hypotension, dizziness, and lightheadedness. He recently visited an urgent care clinic for dysuria and was prescribed Bactrim. He also saw his nephrologist today, where the possibility of HD vs. transplant referral was discussed.    In the ED, the patient was initially hypotensive with BP 78/51, but improved to 107/53 without intervention. Labs were significant for WBC 3.13, Hgb 11.5, Plts 188, CO2 19, BUN 93, Cr 5.3 (baseline 2.4), AST 83, ALT 73, , and Trop 43. CXR showed chronic-appearing interstitial findings with no large focal consolidation. Calcific density along the lateral right upper and mid lung zones suggested pleural calcification or plaques. CT Abd/Pelvis showed no obstructive uropathy and a pancreatic head/uncinate cystic lesion, best evaluated on prior MR imaging. The patient was started on vanc and zosyn for suspected urosepsis and admitted to hospital medicine for further management.    Overview/Hospital Course:  Patient presenting with worsening renal function and hypotension. OP provider recently reduced midodrine dose. Restarted patient on Midodrine 10 TID. Lowered Toprol dose to 12.5 QD. Nephrology following for GRADY on CKD,  likely intrinsic ATN in setting of hypotension. Patient tested positive for Flu A and started on 5 day course of Tamiflu.    Interval History: NAEON. Endorses feeling dizzy when sitting on the side of his bed this morning. Otherwise no complaints.      Objective:     Vital Signs (Most Recent):  Temp: 98.8 °F (37.1 °C) (02/27/25 0706)  Pulse: 75 (02/27/25 0848)  Resp: 18 (02/27/25 0706)  BP: (!) 112/50 (02/27/25 0706)  SpO2: 99 % (02/27/25 0706) Vital Signs (24h Range):  Temp:  [97.7 °F (36.5 °C)-98.8 °F (37.1 °C)] 98.8 °F (37.1 °C)  Pulse:  [61-85] 75  Resp:  [18] 18  SpO2:  [94 %-100 %] 99 %  BP: ()/(39-66) 112/50     Weight: 64.4 kg (142 lb)  Body mass index is 18.23 kg/m².    Intake/Output Summary (Last 24 hours) at 2/27/2025 0904  Last data filed at 2/27/2025 0520  Gross per 24 hour   Intake --   Output 600 ml   Net -600 ml         Physical Exam  Vitals and nursing note reviewed.   Constitutional:       General: He is not in acute distress.     Appearance: Normal appearance. He is ill-appearing.   HENT:      Head: Normocephalic and atraumatic.      Nose: Nose normal.      Mouth/Throat:      Pharynx: Oropharynx is clear.   Eyes:      Extraocular Movements: Extraocular movements intact.      Conjunctiva/sclera: Conjunctivae normal.   Cardiovascular:      Rate and Rhythm: Normal rate and regular rhythm.      Heart sounds: No murmur heard.  Pulmonary:      Effort: Pulmonary effort is normal. No respiratory distress.      Breath sounds: No wheezing or rales.   Chest:      Chest wall: No tenderness.   Abdominal:      General: Abdomen is flat. Bowel sounds are normal. There is no distension.      Palpations: Abdomen is soft.      Tenderness: There is no abdominal tenderness.      Comments: Ostomy with appropriate stool output   Musculoskeletal:         General: No swelling or tenderness. Normal range of motion.      Cervical back: Normal range of motion.      Right lower leg: No edema.      Left lower leg: No  edema.   Lymphadenopathy:      Cervical: No cervical adenopathy.   Skin:     General: Skin is warm and dry.      Coloration: Skin is pale. Skin is not jaundiced.      Findings: No bruising.   Neurological:      General: No focal deficit present.      Mental Status: He is alert and oriented to person, place, and time.   Psychiatric:         Mood and Affect: Mood normal.         Behavior: Behavior normal.             Significant Labs: All pertinent labs within the past 24 hours have been reviewed.    Significant Imaging: I have reviewed all pertinent imaging results/findings within the past 24 hours.    Assessment and Plan     * Acute renal failure superimposed on stage 4 chronic kidney disease  GRADY is likely due to acute tubular necrosis caused by hypotension . Baseline creatinine is  2.4 . Most recent creatinine and eGFR are listed below.  Recent Labs     02/25/25  1655 02/26/25  0429 02/27/25  0355   CREATININE 5.3* 5.6* 5.1*   EGFRNORACEVR 10.6* 9.9* 11.1*        Plan  - GRADY is improving  - Avoid nephrotoxins and renally dose meds for GFR listed above  - Monitor urine output, serial BMP, and adjust therapy as needed  - Nephrology consulted, appreciate recs   - Christa 17, UCr 59, Uurea 550, Uosm 350   - FeNa 1.2% (prerenal vs intrinsic)  - FeUrea 55.5% (intrinsic)  - Renal US negative    Orthostatic hypotension  Patient presents with hypotension. On exam, the patient was noted to be normal heart rate, with adequate perfusion, and no signs of shock. Pt reported dizziness, lightheadedness. Blood pressure normalized without any intervention and further monitoring was initiated. Differential diagnosis includes sepsis and heart failure exacerbation.     - Midodrine 10 TID  - Removed fluid restriction, encourage oral hydration  - Pedialyte 200cc q4h  - Strict intake/output    Influenza A  Flu A positive. Not requiring supplemental oxygen.  - Tamiflu x5 days, renally dosed.      Hypomagnesemia  Patient has Abnormal  Magnesium: hypomagnesemia. Will continue to monitor electrolytes closely. Will replace the affected electrolytes and repeat labs to be done after interventions completed. The patient's magnesium results have been reviewed and are listed below.  Recent Labs   Lab 02/27/25  0355   MG 1.7        Altered bowel elimination due to intestinal ostomy  See Colostomy present       Hypercoagulable state due to paroxysmal atrial fibrillation  Patient has paroxysmal (<7 days) atrial fibrillation. Patient is currently in sinus rhythm. [id number] Score: 4. The patients heart rate in the last 24 hours is as follows:  Pulse  Min: 61  Max: 85     Antiarrhythmics  metoprolol succinate (TOPROL-XL) 24 hr split tablet 12.5 mg, Daily, Oral  amiodarone tablet 200 mg, Daily, Oral    Anticoagulants  apixaban tablet 5 mg, 2 times daily, Oral    Plan  - Replete lytes with a goal of K>4, Mg >2  - Patient is anticoagulated, see medications listed above.  - Patient's afib is currently controlled    Body mass index (BMI) less than 19  Will consider nutrition consult.    Sepsis  This patient does have evidence of infective focus  My overall impression is sepsis.  Source: Respiratory  Antibiotics given-   Antibiotics (72h ago, onward)      None          Latest lactate reviewed-  Recent Labs   Lab 02/25/25  1708   POCLAC 1.5       Organ dysfunction indicated by Acute kidney injury    Fluid challenge Contraindicated- Fluid bolus is contraindicated in this patient due to Congestive Heart Failure     Plan:  - Flu A positive  - On Tamiflu x5 days    UTI (urinary tract infection)  See sepsis         Pancreatic mass  Pancreatic head/uncinate process cystic lesion, better evaluated on prior MR imaging.       Thrombocytopenia, unspecified  The likely etiology of thrombocytopenia is infection. The patients 3 most recent labs are listed below.  Recent Labs     02/25/25  1655 02/26/25  0429 02/27/25  0355    130* 105*     Plan  - Will transfuse if  platelet count is <10k.      Deep vein thrombosis  On Eliquis      Chronic combined systolic and diastolic heart failure  Patient has Combined Systolic and Diastolic heart failure that is Chronic. On presentation their CHF was well compensated. Most recent BNP and echo results are listed below.  Recent Labs     02/25/25  1655   *     Latest ECHO  Results for orders placed during the hospital encounter of 02/25/25    Echo    Interpretation Summary    Left Ventricle: The left ventricle is normal in size. Normal wall thickness. There is concentric hypertrophy. Severe global hypokinesis present. There is moderately reduced systolic function with a visually estimated ejection fraction of 30 - 35%. Quantitated ejection fraction is 30%. There is indeterminate diastolic function. No thrombus.    Right Ventricle: The right ventricle is normal in size. Wall thickness is normal. Systolic function is normal.    Left Atrium: The left atrium is mildly dilated.    Mitral Valve: There is mild to moderate regurgitation with a centrally directed jet.    Tricuspid Valve: There is mild regurgitation with a centrally directed jet.    Pulmonary Artery: The estimated pulmonary artery systolic pressure is 33 mmHg.    IVC/SVC: Intermediate venous pressure at 8 mmHg.    Current Heart Failure Medications  metoprolol succinate (TOPROL-XL) 24 hr split tablet 12.5 mg, Daily, Oral    Plan  - Monitor strict I&Os and daily weights.    - Place on telemetry  - Low sodium diet  - Place on fluid restriction of 1.5 L.   - Cardiology has not been consulted  - The patient's volume status is at their baseline    Elevated troponin  Pt presenting with hypotension. Trop elevated to 43. EKG negative for ST elevation. Pt denies any chest pain. Likely to be demand ischemia in the setting of hypotension.     - Trop 43 >> 30, stopped trending    Echo  Result Date: 2/26/2025    Left Ventricle: The left ventricle is normal in size. Normal wall   thickness.  There is concentric hypertrophy. Severe global hypokinesis   present. There is moderately reduced systolic function with a visually   estimated ejection fraction of 30 - 35%. Quantitated ejection fraction is   30%. There is indeterminate diastolic function. No thrombus.    Right Ventricle: The right ventricle is normal in size. Wall thickness   is normal. Systolic function is normal.    Left Atrium: The left atrium is mildly dilated.    Mitral Valve: There is mild to moderate regurgitation with a centrally   directed jet.    Tricuspid Valve: There is mild regurgitation with a centrally directed   jet.    Pulmonary Artery: The estimated pulmonary artery systolic pressure is   33 mmHg.    IVC/SVC: Intermediate venous pressure at 8 mmHg.          Colostomy present  Adequate output.      Prostate cancer  S/p TURP  Appears to be on leuprolide injections per MAR    Essential hypertension  Patient's blood pressure range in the last 24 hours was: BP  Min: 78/39  Max: 117/61.The patient's inpatient anti-hypertensive regimen is listed below:  Current Antihypertensives  metoprolol succinate (TOPROL-XL) 24 hr split tablet 12.5 mg, Daily, Oral    Plan  - BP is controlled, no changes needed to their regimen    T2DM (type 2 diabetes mellitus)  Patient's FSGs are controlled on current medication regimen.  Last A1c reviewed-   Lab Results   Component Value Date    HGBA1C 6.6 (H) 02/25/2025     Most recent fingerstick glucose reviewed-   Recent Labs   Lab 02/26/25  1112 02/26/25  1526 02/26/25 2055 02/27/25  0707   POCTGLUCOSE 108 118* 173* 138*       Current correctional scale  Low  Maintain anti-hyperglycemic dose as follows-   Antihyperglycemics (From admission, onward)      Start     Stop Route Frequency Ordered    02/25/25 2140  insulin aspart U-100 pen 0-5 Units         -- SubQ Before meals & nightly PRN 02/25/25 2040          Hold Oral hypoglycemics while patient is in the hospital.      VTE Risk Mitigation (From admission,  onward)           Ordered     apixaban tablet 5 mg  2 times daily         02/25/25 2040                    Discharge Planning   JOSÉ MIGUEL: 3/3/2025     Code Status: Full Code   Medical Readiness for Discharge Date:   Discharge Plan A: Home Health                        Jesse Alonzo MD  Department of Hospital Medicine   [facility] ([facility])

## 2025-02-27 NOTE — PLAN OF CARE
Problem: Adult Inpatient Plan of Care  Goal: Plan of Care Review  Outcome: Progressing  Goal: Patient-Specific Goal (Individualized)  Outcome: Progressing  Goal: Absence of Hospital-Acquired Illness or Injury  Outcome: Progressing  Goal: Optimal Comfort and Wellbeing  Outcome: Progressing  Goal: Readiness for Transition of Care  Outcome: Progressing     Problem: Diabetes Comorbidity  Goal: Blood Glucose Level Within Targeted Range  Outcome: Progressing     Problem: Sepsis/Septic Shock  Goal: Optimal Coping  Outcome: Progressing  Goal: Absence of Bleeding  Outcome: Progressing  Goal: Blood Glucose Level Within Targeted Range  Outcome: Progressing  Goal: Absence of Infection Signs and Symptoms  Outcome: Progressing  Goal: Optimal Nutrition Intake  Outcome: Progressing     Problem: Acute Kidney Injury/Impairment  Goal: Fluid and Electrolyte Balance  Outcome: Progressing  Goal: Improved Oral Intake  Outcome: Progressing  Goal: Effective Renal Function  Outcome: Progressing     Problem: Wound  Goal: Optimal Coping  Outcome: Progressing  Goal: Optimal Functional Ability  Outcome: Progressing  Goal: Absence of Infection Signs and Symptoms  Outcome: Progressing  Goal: Improved Oral Intake  Outcome: Progressing  Goal: Optimal Pain Control and Function  Outcome: Progressing  Goal: Skin Health and Integrity  Outcome: Progressing  Goal: Optimal Wound Healing  Outcome: Progressing     Problem: Fall Injury Risk  Goal: Absence of Fall and Fall-Related Injury  Outcome: Progressing           POC updated and reviewed with the patient at bedside. Questions regarding POC were encouraged and addressed. VSS, see flowsheets. Tele maintained per provider's order. Patient is AO x 4 at this time. NAEON. No signs of injury noted over night. Upon exiting room, patient's bed locked in low position, side rails up x 3, with call light within reach. Instructed patient to call staff for mobility, verbalized understanding.  No acute signs of  distress noted at this time.

## 2025-02-27 NOTE — PLAN OF CARE
Problem: Adult Inpatient Plan of Care  Goal: Plan of Care Review  2/26/2025 1813 by Kathryn Zendejas RN  Outcome: Progressing  2/26/2025 1720 by Kathryn Zendejas RN  Outcome: Progressing  Goal: Patient-Specific Goal (Individualized)  2/26/2025 1813 by Kathryn Zendejas RN  Outcome: Progressing  2/26/2025 1720 by Kathryn Zendejas RN  Outcome: Progressing  Goal: Absence of Hospital-Acquired Illness or Injury  2/26/2025 1813 by Kathryn Zendejas RN  Outcome: Progressing  2/26/2025 1720 by Kathryn Zendejas RN  Outcome: Progressing  Goal: Optimal Comfort and Wellbeing  2/26/2025 1813 by Kathryn Zendejas RN  Outcome: Progressing  2/26/2025 1720 by Kathryn Zendejas RN  Outcome: Progressing  Goal: Readiness for Transition of Care  2/26/2025 1813 by Kathryn Zendejas RN  Outcome: Progressing  2/26/2025 1720 by Kathryn Zendejas RN  Outcome: Progressing     Problem: Diabetes Comorbidity  Goal: Blood Glucose Level Within Targeted Range  2/26/2025 1813 by Kathryn Zendejas RN  Outcome: Progressing  2/26/2025 1720 by Kathryn Zendejas RN  Outcome: Progressing     Problem: Sepsis/Septic Shock  Goal: Optimal Coping  2/26/2025 1813 by Kathryn Zendejas RN  Outcome: Progressing  2/26/2025 1720 by Kathryn Zendejas RN  Outcome: Progressing  Goal: Absence of Bleeding  2/26/2025 1813 by Katrhyn Zendejas RN  Outcome: Progressing  2/26/2025 1720 by Kathryn Zendejas RN  Outcome: Progressing  Goal: Blood Glucose Level Within Targeted Range  2/26/2025 1813 by Kathryn Zendejas RN  Outcome: Progressing  2/26/2025 1720 by Kathryn Zendejas RN  Outcome: Progressing  Goal: Absence of Infection Signs and Symptoms  2/26/2025 1813 by Kathryn Zendejas RN  Outcome: Progressing  2/26/2025 1720 by Kathryn Zendejas RN  Outcome: Progressing  Goal: Optimal Nutrition Intake  2/26/2025 1813 by Kathryn Zendejas RN  Outcome: Progressing  2/26/2025 1720 by Roche, Kathryn, RN  Outcome: Progressing     Problem: Acute Kidney Injury/Impairment  Goal: Fluid and Electrolyte Balance  2/26/2025  1813 by Roche, Kathryn, RN  Outcome: Progressing  2/26/2025 1720 by Kathryn Zendejas RN  Outcome: Progressing  Goal: Improved Oral Intake  2/26/2025 1813 by Kathryn Zendejas RN  Outcome: Progressing  2/26/2025 1720 by Kathryn Zendejas RN  Outcome: Progressing  Goal: Effective Renal Function  2/26/2025 1813 by Kathryn Zendejas RN  Outcome: Progressing  2/26/2025 1720 by Kathryn Zendejas RN  Outcome: Progressing     Problem: Wound  Goal: Optimal Coping  2/26/2025 1813 by Kathryn Zendejas RN  Outcome: Progressing  2/26/2025 1720 by Kathryn Zendejas RN  Outcome: Progressing  Goal: Optimal Functional Ability  2/26/2025 1813 by Kathryn Zendejas RN  Outcome: Progressing  2/26/2025 1720 by Kathryn Zendejas RN  Outcome: Progressing  Goal: Absence of Infection Signs and Symptoms  2/26/2025 1813 by Kathryn Zendejas RN  Outcome: Progressing  2/26/2025 1720 by Kathryn Zendejas RN  Outcome: Progressing  Goal: Improved Oral Intake  2/26/2025 1813 by Kathryn Zendejas RN  Outcome: Progressing  2/26/2025 1720 by Kathryn Zendejas RN  Outcome: Progressing  Goal: Optimal Pain Control and Function  2/26/2025 1813 by Kathryn Zendejas RN  Outcome: Progressing  2/26/2025 1720 by Kathryn Zendejas, RN  Outcome: Progressing  Goal: Skin Health and Integrity  2/26/2025 1813 by Kathryn Zendejas RN  Outcome: Progressing  2/26/2025 1720 by Kathryn Zendejas RN  Outcome: Progressing  Goal: Optimal Wound Healing  2/26/2025 1813 by Kathryn Zendejas, RN  Outcome: Progressing  2/26/2025 1720 by Kathryn Zendejas RN  Outcome: Progressing     Problem: Fall Injury Risk  Goal: Absence of Fall and Fall-Related Injury  2/26/2025 1813 by Kathryn Zendejas RN  Outcome: Progressing  2/26/2025 1720 by Kathryn Zendejas RN  Outcome: Progressing

## 2025-02-27 NOTE — MEDICAL/APP STUDENT
[facility] Haro - Neurosurgery ([facility] Medicine  Progress Note    Patient Name: Jarrett Charlton Jr.  MRN: [id number]  Patient Class: IP- Inpatient   Admission Date: 2/25/2025  Length of Stay: 1 days  Attending Physician: Brandon Mccormack MD  Primary Care Provider: Tripp Mohan MD        Subjective:     Chief Complaint   Patient presents with    Hypotension    Abnormal Lab     On antibiotics for uti, abn kidney function L flank       Principal Problem:Acute renal failure superimposed on stage 4 chronic kidney disease    HPI:  Mr. Jarrett Charlton is a 74-year-old male with a PMHx of CKD4, CAD, CHF (EF-35%), DVT on Eliquis, SBO s/p colostomy, chronic hypotension, T2DM who presented to ED after being sent by his nephrologist d/t elevated creatinine. Patient also reports having symptomatic hypotension the past week with home SBPs <80 and lightheadedness. Patient was recently admitted twice for acute CHF exacerbation last month and earlier this month. Patient was also treated with bactrim for UTI symptoms last week.    Of note, patient's midodrine dose was recently reduced from 10mg TID to 2.5 mg BID.    Overview/Hospital Course:  In the ED, patient was found to be hypotensive with a BP of 78/51 but improved to SBP >100 without further intervention. Labs were significant for WBC 3.13, BUN 94, Cr 5.3, , Trop 43. Patient's baseline creatinine is 2.2-2.6. CXR showed chronic appearing interstitial findings with no large focal consolidation. CT A/P showed no evidence for obstructive uropath and a pancreatic cystic lesion noted from previous imaging.     Nephrology was consulted for GRADY on CKD.   Patient presenting with worsening renal function and hypotension. OP provider recently reduced midodrine dose. Restarted patient on Midodrine 10 TID. Lowered Toprol dose to 12.5 QD. Nephrology following for GRADY on CKD, likely intrinsic ATN in setting of hypotension. Patient tested positive for Flu A and started on 5 day  "course of Tamiflu.    Interval History:     Review of Systems  Objective:   Weight: 64.4 kg (142 lb)  Body mass index is 18.23 kg/m².    Intake/Output Summary (Last 24 hours) at 2/27/2025 1229  Last data filed at 2/27/2025 1210  Gross per 24 hour   Intake 200 ml   Output 900 ml   Net -700 ml     Vitals:    02/27/25 1224   BP: (!) 97/53   Pulse: 65   Resp:    Temp:      Physical Exam    Significant Labs: {Results:04900::"All pertinent labs within the past 24 hours have been reviewed."}    Significant Imaging: {Imaging Review:08771::"I have reviewed all pertinent imaging results/findings within the past 24 hours."}        Assessment/Plan:      Assessment & plan notes cannot be loaded without a specified hospital service.    VTE Risk Mitigation (From admission, onward)           Ordered     apixaban tablet 5 mg  2 times daily         02/25/25 2040                    Discharge Planning   JOSÉ MIGUEL: 3/3/2025     Code Status: Full Code   Is the patient medically ready for discharge?:     Reason for patient still in hospital (select all that apply): {HMREASONPATIENTINHOSP:77117}  Discharge Plan A: [facility]                  [facility]  Department of Hospital Medicine   [facility] - Neurosurgery ([facility])   "

## 2025-02-27 NOTE — PROGRESS NOTES
02/27/25 1001 02/27/25 1002   Vital Signs   Pulse 65 73   Heart Rate Source Monitor Monitor   Resp 17 17   SpO2 98 % 98 %   BP (!) 98/49 (!) 76/33   MAP (mmHg) 65 47   BP Location Right arm Right arm   Patient Position Lying Sitting     Hospital Med 4 notified of pt orthostatic vitals listed above. The team was also notified that the patient wasn't able to stand long enough to get a standing reading R/T dizziness.

## 2025-02-27 NOTE — ASSESSMENT & PLAN NOTE
The likely etiology of thrombocytopenia is infection. The patients 3 most recent labs are listed below.  Recent Labs     02/25/25  1655 02/26/25  0429 02/27/25  0355    130* 105*     Plan  - Will transfuse if platelet count is <10k.

## 2025-02-27 NOTE — PROGRESS NOTES
Followed up with pt re: skin tears to arms/legs and buttock wound (moisture). Pt lying in bed, said cream has been applied to buttocks and dressings remain in place and indicated he had no further wound care needs. Will f/u next week re: wounds.    NITZA GlezN, RN,[facility] Wound/Ostomy  2/27/25 02/27/25 1401   WOCN Assessment   WOCN Total Time (mins) 30   Visit Date 02/27/25   Visit Time 1401   Consult Type Follow Up   WOCN Speciality Wound   Wound moisture;skin tear   Number of Wounds 2   Intervention assessed;chart review;coordination of care   Teaching on-going

## 2025-02-27 NOTE — PROGRESS NOTES
Lukasz Haro - Neurosurgery ([facility])  Adult Nutrition  Progress Note    SUMMARY     Recommendations  Continue Renal non dialysis low sodium 2 gm 1800ml  Add Novasource Renal daily   Add diabetic diet restrictions 2000 kcals  Encourage PO intake   Monitor weight and labs     Goals: Pt will meet 85% EEN/EPN by follow up    Nutrition Goal Status: new  Communication of RD Recs:  (POC)    Nutrition Discharge Planning  Nutrition Discharge Planning: Therapeutic diet (comments), Oral supplement regimen (comments)  Therapeutic diet (comments): Renal non dialysis low sodium 2 gm 1800ml    Assessment and Plan  Nutrition Problem  Underweight     Related to (etiology):   Inadequate energy intake     Signs and Symptoms (as evidenced by):   Noted 30 lb weight loss in 3 months   Possible HD candidate      Interventions:  Collaboration by nutrition professional with other providers   Commercial beverage medical food supplement- Novasource Renal daily     Nutrition Diagnosis Status:   New    Malnutrition Assessment  Unable to conduct NFPE, will attempt at follow up   Noted 30 lb weight loss in 3 months     Reason for Assessment  Reason For Assessment: identified at risk by screening criteria  Diagnosis: renal disease (Acute renal failure superimposed on stage 4 chronic kidney disease)  General Information Comments: Pt is currently on a renal non dialysis low sodium 2 gm 1800ml diet. Sanju-20/left dorsal leg, buttocks, lateral lower quadrant. Noted 75% meal intake. Nephrology was consulted for management and potentional HD. Noted 30 lb weight loss in 3 months.    Nutrition/Diet History  Nutrition Intake History: JONI  Food Preferences: JONI  Spiritual, Cultural Beliefs, Yazidi Practices, Values that Affect Care: no  Factors Affecting Nutritional Intake: None identified at this time    Food Insecurity: No Food Insecurity (2/25/2025)    Hunger Vital Sign     Worried About Running Out of Food in the Last Year: Never true     Ran Out of  "Food in the Last Year: Never true     Anthropometrics  Height: 6' 2" (188 cm)  Height (inches): 74 in  Height Method: Stated  Weight: 64.4 kg (141 lb 15.6 oz)  Weight (lb): 141.98 lb  Weight Method: Bed Scale  Ideal Body Weight (IBW), Male: 190 lb  % Ideal Body Weight, Male (lb): 74.73 %  BMI (Calculated): 18.2  BMI Grade: less than 18.5 - underweight  Usual Body Weight (UBW), k.2 kg (24)  % Usual Body Weight: 82.53  % Weight Change From Usual Weight: -17.65 %     Lab/Procedures/Meds  Pertinent Labs Reviewed: reviewed  Pertinent Labs Comments: Na 135, CO2 19, BUN 92, Creatinine 5.1, Ca 8.1, Phos 5.2  Pertinent Medications Reviewed: reviewed  Pertinent Medications Comments: brimonidine, midodrine, pravastatin, tamsulosin    Estimated/Assessed Needs  Weight Used For Calorie Calculations: 64.4 kg (141 lb 15.6 oz)  Energy Calorie Requirements (kcal): 9405-9426 kcals (25-30 kcals/kg)  Energy Need Method: Kcal/kg  Protein Requirements: 77-96g (1.2-1.5g/kg)  Weight Used For Protein Calculations: 64.4 kg (141 lb 15.6 oz)     Estimated Fluid Requirement Method: RDA Method  RDA Method (mL): 1610  CHO Requirement: 200g    Nutrition Prescription Ordered  Current Diet Order: Renal non dialysis diet low sodium 2 gm 1800ml    Evaluation of Received Nutrient/Fluid Intake  I/O: 0/600  Energy Calories Required: meeting needs  Protein Required: meeting needs  Fluid Required: meeting needs  Comments: LBM-25  Tolerance: tolerating  % Intake of Estimated Energy Needs: 75 - 100 %  % Meal Intake: 75 - 100 %    Nutrition Risk  Level of Risk/Frequency of Follow-up: low (1x/week)     Monitor and Evaluation  Monitor and Evaluation: Energy intake, Food and beverage intake, Protein intake, Diet order, Weight     Nutrition Follow-Up  RD Follow-up?: Yes      "

## 2025-02-27 NOTE — SUBJECTIVE & OBJECTIVE
Interval History: No active events overnight, vitals were stable. Creatinine improved today from 5.6 to 5.1    Review of patient's allergies indicates:   Allergen Reactions    Atorvastatin     Rosuvastatin      Current Facility-Administered Medications   Medication Frequency    acetaminophen tablet 1,000 mg Q8H PRN    amiodarone tablet 200 mg Daily    apixaban tablet 5 mg BID    brimonidine 0.2% ophthalmic solution 1 drop BID    dextrose 50% injection 12.5 g PRN    dextrose 50% injection 25 g PRN    electrolytes-dextrose (Pedialyte) oral solution 200 mL Q4H    glucagon (human recombinant) injection 1 mg PRN    glucose chewable tablet 16 g PRN    glucose chewable tablet 24 g PRN    insulin aspart U-100 pen 0-5 Units QID (AC + HS) PRN    metoprolol succinate (TOPROL-XL) 24 hr split tablet 12.5 mg Daily    midodrine tablet 10 mg TID WM    naloxone 0.4 mg/mL injection 0.02 mg PRN    oseltamivir capsule 30 mg Daily    pravastatin tablet 40 mg Daily    sodium chloride 0.9% flush 10 mL Q12H PRN    tamsulosin 24 hr capsule 0.4 mg Daily     Facility-Administered Medications Ordered in Other Encounters   Medication Frequency    sodium chloride 0.9% in 1,000 mL infusion Continuous       Objective:     Vital Signs (Most Recent):  Temp: 98 °F (36.7 °C) (02/27/25 1530)  Pulse: 64 (02/27/25 1530)  Resp: 18 (02/27/25 1530)  BP: (!) 108/54 (02/27/25 1530)  SpO2: 99 % (02/27/25 1530) Vital Signs (24h Range):  Temp:  [98 °F (36.7 °C)-98.8 °F (37.1 °C)] 98 °F (36.7 °C)  Pulse:  [64-82] 64  Resp:  [17-18] 18  SpO2:  [97 %-99 %] 99 %  BP: ()/(33-61) 108/54     Weight: 64.4 kg (141 lb 15.6 oz) (02/27/25 1044)  Body mass index is 18.23 kg/m².  Body surface area is 1.83 meters squared.    I/O last 3 completed shifts:  In: 500 [IV Piggyback:500]  Out: 600 [Urine:450; Stool:150]     Physical Exam  Vitals and nursing note reviewed.   Constitutional:       Appearance: Normal appearance. He is ill-appearing.   HENT:      Nose: No  congestion or rhinorrhea.   Cardiovascular:      Rate and Rhythm: Normal rate and regular rhythm.      Pulses: Normal pulses.      Heart sounds: Normal heart sounds.   Pulmonary:      Effort: Pulmonary effort is normal. No respiratory distress.      Breath sounds: Normal breath sounds. No wheezing.   Abdominal:      General: There is no distension.      Tenderness: There is no abdominal tenderness.   Musculoskeletal:      Right lower leg: No edema.      Left lower leg: No edema.   Skin:     General: Skin is warm and dry.   Neurological:      Mental Status: He is alert and oriented to person, place, and time.   Psychiatric:         Mood and Affect: Mood normal.         Behavior: Behavior normal.          Significant Labs:  All labs within the past 24 hours have been reviewed.     Significant Imaging:

## 2025-02-27 NOTE — ASSESSMENT & PLAN NOTE
GRADY is likely due to acute tubular necrosis caused by hypotension . Baseline creatinine is  2.4 . Most recent creatinine and eGFR are listed below.  Recent Labs     02/25/25  1655 02/26/25  0429 02/27/25  0355   CREATININE 5.3* 5.6* 5.1*   EGFRNORACEVR 10.6* 9.9* 11.1*        Plan  - GRADY is improving  - Avoid nephrotoxins and renally dose meds for GFR listed above  - Monitor urine output, serial BMP, and adjust therapy as needed  - Nephrology consulted, appreciate recs   - Christa 17, UCr 59, Uurea 550, Uosm 350   - FeNa 1.2% (prerenal vs intrinsic)  - FeUrea 55.5% (intrinsic)  - Renal US negative

## 2025-02-27 NOTE — ASSESSMENT & PLAN NOTE
Presented yesterday to [facility] ED for the evaluation of abnormal labs, which shows worsening kidney function. He endorses dizziness, hypotension and lightheadedness for quite sometime now. Recent urinary symptoms which made him to go to urgent care and was treated for UTI with Bactrim. In the ED, he was hypotensive with a BP of 78/51, later 107/53 without intervention. Labs were remarkable for WBC 3.13, Hgb 11.5, Plts 188, CO2 19, BUN 93, Cr 5.3 (baseline 2.5), AST 83, ALT 73, , and Trop 43. Chest xray shows no acute abnormalities. CT Abd/Pelvis showed no obstructive uropathy and a pancreatic head/uncinate cystic lesion. The patient was started on vanc and zosyn for suspected urosepsis and admitted to hospital medicine for further management. ECHO performed 02/26 shows  concentric hypertrophy, severe global hypokinesis, moderately reduced systolic function with an EF of 30 - 35%. Retro US unremarkable, bilateral simple cyst.    Elevated creatinine likely d/o hypotension vs medication    Plan:   Lab Results   Component Value Date    CREATININE 5.1 (H) 02/27/2025     We discussed the likely need for HD in the future with the patient, but at the moment we will monitor closely.  - Agree with midodrine  - Encourage hydration  - Avoid nephrotoxic agents such as NSAIDs, gadolinium and IV radiocontrast.  - Renally dose meds to current GFR.  - Maintain MAP > 65.  - Patient will likely need long time dialysis in the future.  --Daily BMP

## 2025-02-27 NOTE — PROGRESS NOTES
Lukasz Haro - Neurosurgery ([facility])  Nephrology  Progress Note    Patient Name: Jarrett Charlton Jr.  MRN: [id number]  Admission Date: 2/25/2025  Hospital Length of Stay: 1 days  Attending Provider: Brandon Mccormack MD   Primary Care Physician: Tripp Mohan MD  Principal Problem:Acute renal failure superimposed on stage 4 chronic kidney disease    Subjective:     HPI: Mr. Zoltan Farias is a 74 y.o male with a PMHx of CKD stage 4, CAD s/p PCI to mid-LAD in 2017, h/o VT, AT s/p AT ablation in 4/2024, DVT on Eliquis, CHF with EF of 35%, SBO s/p colostomy, HTN, HLD, and T2DM. Presented yesterday to [facility] ED for the evaluation of abnormal labs, which shows worsening kidney function. He endorses dizziness, hypotension and lightheadedness for quite sometime now. Recent urinary symptoms which made him to go to urgent care and was treated for UTI with Bactrim.    In the ED, he was hypotensive with a BP of 78/51, later 107/53 without intervention. Labs were remarkable for WBC 3.13, Hgb 11.5, Plts 188, CO2 19, BUN 93, Cr 5.3 (baseline 2.5), AST 83, ALT 73, , and Trop 43. Chest xray shows no acute abnormalities. CT Abd/Pelvis showed no obstructive uropathy and a pancreatic head/uncinate cystic lesion. The patient was started on vanc and zosyn for suspected urosepsis and admitted to hospital medicine for further management. ECHO performed 02/26 shows  concentric hypertrophy, severe global hypokinesis, moderately reduced systolic function with an EF of 30 - 35%.    Nephrology was consulted for management and potential HD    Interval History: No active events overnight, vitals were stable. Creatinine improved today from 5.6 to 5.1    Review of patient's allergies indicates:   Allergen Reactions    Atorvastatin     Rosuvastatin      Current Facility-Administered Medications   Medication Frequency    acetaminophen tablet 1,000 mg Q8H PRN    amiodarone tablet 200 mg Daily    apixaban tablet 5 mg BID    brimonidine 0.2%  ophthalmic solution 1 drop BID    dextrose 50% injection 12.5 g PRN    dextrose 50% injection 25 g PRN    electrolytes-dextrose (Pedialyte) oral solution 200 mL Q4H    glucagon (human recombinant) injection 1 mg PRN    glucose chewable tablet 16 g PRN    glucose chewable tablet 24 g PRN    insulin aspart U-100 pen 0-5 Units QID (AC + HS) PRN    metoprolol succinate (TOPROL-XL) 24 hr split tablet 12.5 mg Daily    midodrine tablet 10 mg TID WM    naloxone 0.4 mg/mL injection 0.02 mg PRN    oseltamivir capsule 30 mg Daily    pravastatin tablet 40 mg Daily    sodium chloride 0.9% flush 10 mL Q12H PRN    tamsulosin 24 hr capsule 0.4 mg Daily     Facility-Administered Medications Ordered in Other Encounters   Medication Frequency    sodium chloride 0.9% in 1,000 mL infusion Continuous       Objective:     Vital Signs (Most Recent):  Temp: 98 °F (36.7 °C) (02/27/25 1530)  Pulse: 64 (02/27/25 1530)  Resp: 18 (02/27/25 1530)  BP: (!) 108/54 (02/27/25 1530)  SpO2: 99 % (02/27/25 1530) Vital Signs (24h Range):  Temp:  [98 °F (36.7 °C)-98.8 °F (37.1 °C)] 98 °F (36.7 °C)  Pulse:  [64-82] 64  Resp:  [17-18] 18  SpO2:  [97 %-99 %] 99 %  BP: ()/(33-61) 108/54     Weight: 64.4 kg (141 lb 15.6 oz) (02/27/25 1044)  Body mass index is 18.23 kg/m².  Body surface area is 1.83 meters squared.    I/O last 3 completed shifts:  In: 500 [IV Piggyback:500]  Out: 600 [Urine:450; Stool:150]     Physical Exam  Vitals and nursing note reviewed.   Constitutional:       Appearance: Normal appearance. He is ill-appearing.   HENT:      Nose: No congestion or rhinorrhea.   Cardiovascular:      Rate and Rhythm: Normal rate and regular rhythm.      Pulses: Normal pulses.      Heart sounds: Normal heart sounds.   Pulmonary:      Effort: Pulmonary effort is normal. No respiratory distress.      Breath sounds: Normal breath sounds. No wheezing.   Abdominal:      General: There is no distension.      Tenderness: There is no abdominal tenderness.    Musculoskeletal:      Right lower leg: No edema.      Left lower leg: No edema.   Skin:     General: Skin is warm and dry.   Neurological:      Mental Status: He is alert and oriented to person, place, and time.   Psychiatric:         Mood and Affect: Mood normal.         Behavior: Behavior normal.          Significant Labs:  All labs within the past 24 hours have been reviewed.     Significant Imaging:     Assessment/Plan:     Renal/  * Acute renal failure superimposed on stage 4 chronic kidney disease  Presented yesterday to [facility] ED for the evaluation of abnormal labs, which shows worsening kidney function. He endorses dizziness, hypotension and lightheadedness for quite sometime now. Recent urinary symptoms which made him to go to urgent care and was treated for UTI with Bactrim. In the ED, he was hypotensive with a BP of 78/51, later 107/53 without intervention. Labs were remarkable for WBC 3.13, Hgb 11.5, Plts 188, CO2 19, BUN 93, Cr 5.3 (baseline 2.5), AST 83, ALT 73, , and Trop 43. Chest xray shows no acute abnormalities. CT Abd/Pelvis showed no obstructive uropathy and a pancreatic head/uncinate cystic lesion. The patient was started on vanc and zosyn for suspected urosepsis and admitted to hospital medicine for further management. ECHO performed 02/26 shows  concentric hypertrophy, severe global hypokinesis, moderately reduced systolic function with an EF of 30 - 35%. Retro US unremarkable, bilateral simple cyst.    Elevated creatinine likely d/o hypotension vs medication    Plan:   Lab Results   Component Value Date    CREATININE 5.1 (H) 02/27/2025     We discussed the likely need for HD in the future with the patient, but at the moment we will monitor closely.  - Agree with midodrine  - Encourage hydration  - Avoid nephrotoxic agents such as NSAIDs, gadolinium and IV radiocontrast.  - Renally dose meds to current GFR.  - Maintain MAP > 65.  - Patient will likely need long time dialysis in the  future.  --Daily BMP          Thank you for your consult. I will follow-up with patient. Please contact us if you have any additional questions.    Sean Sims MD  Nephrology  [facility] ([facility])

## 2025-02-27 NOTE — ASSESSMENT & PLAN NOTE
Pt presenting with hypotension. Trop elevated to 43. EKG negative for ST elevation. Pt denies any chest pain. Likely to be demand ischemia in the setting of hypotension.     - Trop 43 >> 30, stopped trending    Echo  Result Date: 2/26/2025    Left Ventricle: The left ventricle is normal in size. Normal wall   thickness. There is concentric hypertrophy. Severe global hypokinesis   present. There is moderately reduced systolic function with a visually   estimated ejection fraction of 30 - 35%. Quantitated ejection fraction is   30%. There is indeterminate diastolic function. No thrombus.    Right Ventricle: The right ventricle is normal in size. Wall thickness   is normal. Systolic function is normal.    Left Atrium: The left atrium is mildly dilated.    Mitral Valve: There is mild to moderate regurgitation with a centrally   directed jet.    Tricuspid Valve: There is mild regurgitation with a centrally directed   jet.    Pulmonary Artery: The estimated pulmonary artery systolic pressure is   33 mmHg.    IVC/SVC: Intermediate venous pressure at 8 mmHg.

## 2025-02-27 NOTE — SUBJECTIVE & OBJECTIVE
Interval History: CAMERON Endorses feeling dizzy when sitting on the side of his bed this morning. Otherwise no complaints.      Objective:     Vital Signs (Most Recent):  Temp: 98.8 °F (37.1 °C) (02/27/25 0706)  Pulse: 75 (02/27/25 0848)  Resp: 18 (02/27/25 0706)  BP: (!) 112/50 (02/27/25 0706)  SpO2: 99 % (02/27/25 0706) Vital Signs (24h Range):  Temp:  [97.7 °F (36.5 °C)-98.8 °F (37.1 °C)] 98.8 °F (37.1 °C)  Pulse:  [61-85] 75  Resp:  [18] 18  SpO2:  [94 %-100 %] 99 %  BP: ()/(39-66) 112/50     Weight: 64.4 kg (142 lb)  Body mass index is 18.23 kg/m².    Intake/Output Summary (Last 24 hours) at 2/27/2025 0904  Last data filed at 2/27/2025 0520  Gross per 24 hour   Intake --   Output 600 ml   Net -600 ml         Physical Exam  Vitals and nursing note reviewed.   Constitutional:       General: He is not in acute distress.     Appearance: Normal appearance. He is ill-appearing.   HENT:      Head: Normocephalic and atraumatic.      Nose: Nose normal.      Mouth/Throat:      Pharynx: Oropharynx is clear.   Eyes:      Extraocular Movements: Extraocular movements intact.      Conjunctiva/sclera: Conjunctivae normal.   Cardiovascular:      Rate and Rhythm: Normal rate and regular rhythm.      Heart sounds: No murmur heard.  Pulmonary:      Effort: Pulmonary effort is normal. No respiratory distress.      Breath sounds: No wheezing or rales.   Chest:      Chest wall: No tenderness.   Abdominal:      General: Abdomen is flat. Bowel sounds are normal. There is no distension.      Palpations: Abdomen is soft.      Tenderness: There is no abdominal tenderness.      Comments: Ostomy with appropriate stool output   Musculoskeletal:         General: No swelling or tenderness. Normal range of motion.      Cervical back: Normal range of motion.      Right lower leg: No edema.      Left lower leg: No edema.   Lymphadenopathy:      Cervical: No cervical adenopathy.   Skin:     General: Skin is warm and dry.      Coloration:  Skin is pale. Skin is not jaundiced.      Findings: No bruising.   Neurological:      General: No focal deficit present.      Mental Status: He is alert and oriented to person, place, and time.   Psychiatric:         Mood and Affect: Mood normal.         Behavior: Behavior normal.             Significant Labs: All pertinent labs within the past 24 hours have been reviewed.    Significant Imaging: I have reviewed all pertinent imaging results/findings within the past 24 hours.

## 2025-02-27 NOTE — PLAN OF CARE
Recommendations  Continue Renal non dialysis low sodium 2 gm 1800ml  Add Novasource Renal daily   Add diabetic diet restrictions 2000 kcals  Encourage PO intake   Monitor weight and labs     Goals: Pt will meet 85% EEN/EPN by follow up

## 2025-02-27 NOTE — ASSESSMENT & PLAN NOTE
Patient has paroxysmal (<7 days) atrial fibrillation. Patient is currently in sinus rhythm. [id number] Score: 4. The patients heart rate in the last 24 hours is as follows:  Pulse  Min: 61  Max: 85     Antiarrhythmics  metoprolol succinate (TOPROL-XL) 24 hr split tablet 12.5 mg, Daily, Oral  amiodarone tablet 200 mg, Daily, Oral    Anticoagulants  apixaban tablet 5 mg, 2 times daily, Oral    Plan  - Replete lytes with a goal of K>4, Mg >2  - Patient is anticoagulated, see medications listed above.  - Patient's afib is currently controlled

## 2025-02-27 NOTE — NURSING
"Patient's /59 while taking 0000 vitals. Notified Hospital [facility] on call provider (DIANA Vazquez MD) of pt's BP. On call provider stated to "lets keep monitoring for now".  No acute signs of distress noted at this time.   "

## 2025-02-27 NOTE — ASSESSMENT & PLAN NOTE
Patient's FSGs are controlled on current medication regimen.  Last A1c reviewed-   Lab Results   Component Value Date    HGBA1C 6.6 (H) 02/25/2025     Most recent fingerstick glucose reviewed-   Recent Labs   Lab 02/26/25  1112 02/26/25  1526 02/26/25 2055 02/27/25  0707   POCTGLUCOSE 108 118* 173* 138*       Current correctional scale  Low  Maintain anti-hyperglycemic dose as follows-   Antihyperglycemics (From admission, onward)    Start     Stop Route Frequency Ordered    02/25/25 2140  insulin aspart U-100 pen 0-5 Units         -- SubQ Before meals & nightly PRN 02/25/25 2040        Hold Oral hypoglycemics while patient is in the hospital.

## 2025-02-27 NOTE — ASSESSMENT & PLAN NOTE
Patient's blood pressure range in the last 24 hours was: BP  Min: 78/39  Max: 117/61.The patient's inpatient anti-hypertensive regimen is listed below:  Current Antihypertensives  metoprolol succinate (TOPROL-XL) 24 hr split tablet 12.5 mg, Daily, Oral    Plan  - BP is controlled, no changes needed to their regimen

## 2025-02-27 NOTE — CONSULTS
Luk[facility]z Haro - Neurosurgery ([facility])  Wound Care    Patient Name:  Jarrett Charlton Jr.   MRN:  [id number]  Date: 2/26/2025  Diagnosis: Acute renal failure superimposed on stage 4 chronic kidney disease    Pt seen for wound consult- skin tears. Pt awake, lying in bed, said wounds were covered and he did not need any further wound care. Pt said he fell at home, resulting in bruising and skin tears. Discussed wound care plan with pt re: dressings to skin tears to say in place x 1 week, then changed. Pt agreeable.     Recs:  Skin tear protocol  Zinc barrier to buttocks, daily and prn  Waffle overlay/PIP      June Ashford, BSN, RN,[facility] Wound/Ostomy  2/26/25 02/26/25 1450   WOCN Assessment   WOCN Total Time (mins) 30   Visit Date 02/26/25   Visit Time 1450   Consult Type New   WOCN Speciality Wound   Wound skin tear   Number of Wounds   (mutiple skin tears/bruises)   Intervention assessed;chart review;coordination of care   Teaching on-going        Wound 02/25/25 1704 Abrasion(s) Left dorsal Leg   Date First Assessed/Time First Assessed: 02/25/25 1704   Primary Wound Type: Abrasion(s)  Side: Left  Orientation: dorsal  Location: Leg   Dressing Appearance Dry;Intact;Clean   Drainage Amount None   Drainage Characteristics/Odor No odor   Appearance Dressing in place, unable to visualize     History:     Past Medical History:   Diagnosis Date    Anticoagulant long-term use     Basal cell carcinoma     BPH (benign prostatic hyperplasia)     s/p TURP    Carotid stenosis     Chronic kidney disease     Claudication     DDD (degenerative disc disease) 10/21/2013    Diabetes mellitus with renal complications     Disc disease, degenerative, cervical     DVT (deep venous thrombosis)     Encounter for blood transfusion     GERD (gastroesophageal reflux disease)     Gout, chronic     History of ulcerative colitis     s/p colectomy and ileostomy    HLD (hyperlipidemia)     HTN (hypertension)     Ileostomy in place 1982     Paroxysmal atrial fibrillation     RBBB     Squamous cell carcinoma 03/08/2018    Left superior helix near insertion    Squamous cell carcinoma 04/12/2018    Left forearm x 5    Syncope 07/06/2024    Ventricular tachycardia        Social History[1]    Precautions:     Allergies as of 02/25/2025 - Reviewed 02/25/2025   Allergen Reaction Noted    Atorvastatin  03/31/2021    Rosuvastatin  03/31/2021       WOC Assessment Details/Treatment   See above       [1]   Social History  Socioeconomic History    Marital status:     Number of children: 1   Occupational History    Occupation:  x 44 years     Comment: Retired    Occupation: [place]    Tobacco Use    Smoking status: Never     Passive exposure: Never    Smokeless tobacco: Never   Substance and Sexual Activity    Alcohol use: No    Drug use: No    Sexual activity: Not Currently     Partners: Female     Social Drivers of Health     Financial Resource Strain: Low Risk  (2/25/2025)    Overall Financial Resource Strain (CARDIA)     Difficulty of Paying Living Expenses: Not very hard   Food Insecurity: No Food Insecurity (2/25/2025)    Hunger Vital Sign     Worried About Running Out of Food in the Last Year: Never true     Ran Out of Food in the Last Year: Never true   Transportation Needs: No Transportation Needs (2/25/2025)    PRAPARE - Transportation     Lack of Transportation (Medical): No     Lack of Transportation (Non-Medical): No   Physical Activity: Insufficiently Active (2/25/2025)    Exercise Vital Sign     Days of Exercise per Week: 3 days     Minutes of Exercise per Session: 30 min   Stress: Stress Concern Present (2/25/2025)    [employer] - Occupational Stress Questionnaire     Feeling of Stress : To some extent   Housing Stability: Low Risk  (2/25/2025)    Housing Stability Vital Sign     Unable to Pay for Housing in the Last Year: No     Homeless in the Last Year: No

## 2025-02-28 LAB
AFP SERPL-MCNC: <2 NG/ML (ref 0–8.4)
ALBUMIN SERPL BCP-MCNC: 2.8 G/DL (ref 3.5–5.2)
ALP SERPL-CCNC: 118 U/L (ref 40–150)
ALT SERPL W/O P-5'-P-CCNC: 79 U/L (ref 10–44)
ANION GAP SERPL CALC-SCNC: 14 MMOL/L (ref 8–16)
AST SERPL-CCNC: 72 U/L (ref 10–40)
BASOPHILS # BLD AUTO: 0.01 K/UL (ref 0–0.2)
BASOPHILS NFR BLD: 0.3 % (ref 0–1.9)
BILIRUB SERPL-MCNC: 0.7 MG/DL (ref 0.1–1)
BUN SERPL-MCNC: 74 MG/DL (ref 8–23)
CALCIUM SERPL-MCNC: 8.3 MG/DL (ref 8.7–10.5)
CHLORIDE SERPL-SCNC: 102 MMOL/L (ref 95–110)
CO2 SERPL-SCNC: 19 MMOL/L (ref 23–29)
CREAT SERPL-MCNC: 4.6 MG/DL (ref 0.5–1.4)
DIFFERENTIAL METHOD BLD: ABNORMAL
EOSINOPHIL # BLD AUTO: 0.1 K/UL (ref 0–0.5)
EOSINOPHIL NFR BLD: 2.6 % (ref 0–8)
ERYTHROCYTE [DISTWIDTH] IN BLOOD BY AUTOMATED COUNT: 18.5 % (ref 11.5–14.5)
EST. GFR  (NO RACE VARIABLE): 12.6 ML/MIN/1.73 M^2
GLUCOSE SERPL-MCNC: 86 MG/DL (ref 70–110)
HAV IGM SERPL QL IA: NORMAL
HBV CORE IGM SERPL QL IA: NORMAL
HBV SURFACE AG SERPL QL IA: NORMAL
HCT VFR BLD AUTO: 32.3 % (ref 40–54)
HCV AB SERPL QL IA: NORMAL
HGB BLD-MCNC: 10.1 G/DL (ref 14–18)
IMM GRANULOCYTES # BLD AUTO: 0.01 K/UL (ref 0–0.04)
IMM GRANULOCYTES NFR BLD AUTO: 0.3 % (ref 0–0.5)
LYMPHOCYTES # BLD AUTO: 1.1 K/UL (ref 1–4.8)
LYMPHOCYTES NFR BLD: 34.5 % (ref 18–48)
MAGNESIUM SERPL-MCNC: 1.8 MG/DL (ref 1.6–2.6)
MCH RBC QN AUTO: 27.4 PG (ref 27–31)
MCHC RBC AUTO-ENTMCNC: 31.3 G/DL (ref 32–36)
MCV RBC AUTO: 88 FL (ref 82–98)
MONOCYTES # BLD AUTO: 0.3 K/UL (ref 0.3–1)
MONOCYTES NFR BLD: 8.6 % (ref 4–15)
NEUTROPHILS # BLD AUTO: 1.6 K/UL (ref 1.8–7.7)
NEUTROPHILS NFR BLD: 53.7 % (ref 38–73)
NRBC BLD-RTO: 0 /100 WBC
PHOSPHATE SERPL-MCNC: 3.9 MG/DL (ref 2.7–4.5)
PLATELET # BLD AUTO: 89 K/UL (ref 150–450)
PMV BLD AUTO: ABNORMAL FL (ref 9.2–12.9)
POCT GLUCOSE: 119 MG/DL (ref 70–110)
POCT GLUCOSE: 148 MG/DL (ref 70–110)
POCT GLUCOSE: 168 MG/DL (ref 70–110)
POCT GLUCOSE: 78 MG/DL (ref 70–110)
POTASSIUM SERPL-SCNC: 3.9 MMOL/L (ref 3.5–5.1)
PROT SERPL-MCNC: 5.4 G/DL (ref 6–8.4)
RBC # BLD AUTO: 3.69 M/UL (ref 4.6–6.2)
SODIUM SERPL-SCNC: 135 MMOL/L (ref 136–145)
WBC # BLD AUTO: 3.04 K/UL (ref 3.9–12.7)

## 2025-02-28 PROCEDURE — 80074 ACUTE HEPATITIS PANEL: CPT | Mod: HCNC | Performed by: HOSPITALIST

## 2025-02-28 PROCEDURE — 25000003 PHARM REV CODE 250: Mod: HCNC

## 2025-02-28 PROCEDURE — 11000001 HC ACUTE MED/SURG PRIVATE ROOM: Mod: HCNC

## 2025-02-28 PROCEDURE — 36415 COLL VENOUS BLD VENIPUNCTURE: CPT | Mod: HCNC | Performed by: HOSPITALIST

## 2025-02-28 PROCEDURE — 83735 ASSAY OF MAGNESIUM: CPT | Mod: HCNC

## 2025-02-28 PROCEDURE — 97116 GAIT TRAINING THERAPY: CPT | Mod: HCNC

## 2025-02-28 PROCEDURE — 97165 OT EVAL LOW COMPLEX 30 MIN: CPT | Mod: HCNC

## 2025-02-28 PROCEDURE — 85025 COMPLETE CBC W/AUTO DIFF WBC: CPT | Mod: HCNC

## 2025-02-28 PROCEDURE — 27000207 HC ISOLATION: Mod: HCNC

## 2025-02-28 PROCEDURE — 97535 SELF CARE MNGMENT TRAINING: CPT | Mod: HCNC

## 2025-02-28 PROCEDURE — 99233 SBSQ HOSP IP/OBS HIGH 50: CPT | Mod: HCNC,GC,, | Performed by: STUDENT IN AN ORGANIZED HEALTH CARE EDUCATION/TRAINING PROGRAM

## 2025-02-28 PROCEDURE — 97162 PT EVAL MOD COMPLEX 30 MIN: CPT | Mod: HCNC

## 2025-02-28 PROCEDURE — 82105 ALPHA-FETOPROTEIN SERUM: CPT | Mod: HCNC | Performed by: HOSPITALIST

## 2025-02-28 PROCEDURE — 80053 COMPREHEN METABOLIC PANEL: CPT | Mod: HCNC

## 2025-02-28 PROCEDURE — 84100 ASSAY OF PHOSPHORUS: CPT | Mod: HCNC

## 2025-02-28 RX ORDER — PANTOPRAZOLE SODIUM 40 MG/1
40 TABLET, DELAYED RELEASE ORAL DAILY
Status: DISCONTINUED | OUTPATIENT
Start: 2025-02-28 | End: 2025-03-04 | Stop reason: HOSPADM

## 2025-02-28 RX ORDER — ASPIRIN 81 MG/1
81 TABLET ORAL DAILY
Status: DISCONTINUED | OUTPATIENT
Start: 2025-02-28 | End: 2025-03-04 | Stop reason: HOSPADM

## 2025-02-28 RX ORDER — SODIUM BICARBONATE 650 MG/1
1300 TABLET ORAL 2 TIMES DAILY
Status: DISCONTINUED | OUTPATIENT
Start: 2025-02-28 | End: 2025-03-04 | Stop reason: HOSPADM

## 2025-02-28 RX ADMIN — ASPIRIN 81 MG: 81 TABLET, COATED ORAL at 02:02

## 2025-02-28 RX ADMIN — ACETAMINOPHEN 1000 MG: 500 TABLET ORAL at 10:02

## 2025-02-28 RX ADMIN — AMIODARONE HYDROCHLORIDE 200 MG: 200 TABLET ORAL at 09:02

## 2025-02-28 RX ADMIN — APIXABAN 5 MG: 5 TABLET, FILM COATED ORAL at 10:02

## 2025-02-28 RX ADMIN — Medication 200 ML: at 11:02

## 2025-02-28 RX ADMIN — Medication 200 ML: at 03:02

## 2025-02-28 RX ADMIN — BRIMONIDINE TARTRATE 1 DROP: 2 SOLUTION OPHTHALMIC at 10:02

## 2025-02-28 RX ADMIN — SODIUM BICARBONATE 650 MG TABLET 1300 MG: at 02:02

## 2025-02-28 RX ADMIN — PRAVASTATIN SODIUM 40 MG: 40 TABLET ORAL at 10:02

## 2025-02-28 RX ADMIN — SODIUM BICARBONATE 650 MG TABLET 1300 MG: at 09:02

## 2025-02-28 RX ADMIN — METOPROLOL SUCCINATE 12.5 MG: 25 TABLET, EXTENDED RELEASE ORAL at 09:02

## 2025-02-28 RX ADMIN — TAMSULOSIN HYDROCHLORIDE 0.4 MG: 0.4 CAPSULE ORAL at 10:02

## 2025-02-28 RX ADMIN — MIDODRINE HYDROCHLORIDE 10 MG: 5 TABLET ORAL at 11:02

## 2025-02-28 RX ADMIN — PANTOPRAZOLE SODIUM 40 MG: 40 TABLET, DELAYED RELEASE ORAL at 02:02

## 2025-02-28 RX ADMIN — BRIMONIDINE TARTRATE 1 DROP: 2 SOLUTION OPHTHALMIC at 09:02

## 2025-02-28 RX ADMIN — MIDODRINE HYDROCHLORIDE 10 MG: 5 TABLET ORAL at 07:02

## 2025-02-28 RX ADMIN — OSELTAMIVIR PHOSPHATE 30 MG: 30 CAPSULE ORAL at 09:02

## 2025-02-28 RX ADMIN — MIDODRINE HYDROCHLORIDE 10 MG: 5 TABLET ORAL at 06:02

## 2025-02-28 RX ADMIN — APIXABAN 5 MG: 5 TABLET, FILM COATED ORAL at 09:02

## 2025-02-28 NOTE — ASSESSMENT & PLAN NOTE
The likely etiology of thrombocytopenia is infection. The patients 3 most recent labs are listed below.  Recent Labs     02/26/25  0429 02/27/25  0355 02/28/25  0610   * 105* 89*       Plan  - Will transfuse if platelet count is <10k.

## 2025-02-28 NOTE — ASSESSMENT & PLAN NOTE
GRADY is likely due to acute tubular necrosis caused by hypotension . Baseline creatinine is  2.4 . Most recent creatinine and eGFR are listed below.  Recent Labs     02/26/25  0429 02/27/25  0355 02/28/25  0610   CREATININE 5.6* 5.1* 4.6*   EGFRNORACEVR 9.9* 11.1* 12.6*        Plan  - GRADY is improving  - Avoid nephrotoxins and renally dose meds for GFR listed above  - Monitor urine output, serial BMP, and adjust therapy as needed  - Nephrology consulted, appreciate recs   - Christa 17, UCr 59, Uurea 550, Uosm 350   - FeNa 1.2% (prerenal vs intrinsic)  - FeUrea 55.5% (intrinsic)  - Renal US negative

## 2025-02-28 NOTE — PLAN OF CARE
Problem: Adult Inpatient Plan of Care  Goal: Plan of Care Review  Outcome: Progressing  Goal: Patient-Specific Goal (Individualized)  Outcome: Progressing  Goal: Absence of Hospital-Acquired Illness or Injury  Outcome: Progressing     Problem: Diabetes Comorbidity  Goal: Blood Glucose Level Within Targeted Range  Outcome: Progressing     Problem: Acute Kidney Injury/Impairment  Goal: Fluid and Electrolyte Balance  Outcome: Progressing     Problem: Wound  Goal: Optimal Coping  Outcome: Progressing  Goal: Optimal Functional Ability  Outcome: Progressing     Problem: Fall Injury Risk  Goal: Absence of Fall and Fall-Related Injury  Outcome: Progressing  Pt resting quietly; alert with no distress this shift. See flowsheet for assessment and VS. Fluids encouraged. Pt voiding with urinal and self care ileostomy. Tele orders in place. Blood glucose monitoring with treatment of hypo/hyperglycemia. Fall precautions in place and call light in reach.

## 2025-02-28 NOTE — ASSESSMENT & PLAN NOTE
Patient's FSGs are controlled on current medication regimen.  Last A1c reviewed-   Lab Results   Component Value Date    HGBA1C 6.6 (H) 02/25/2025     Most recent fingerstick glucose reviewed-   Recent Labs   Lab 02/27/25  1110 02/27/25  1531 02/27/25 2017 02/28/25  0756   POCTGLUCOSE 188* 119* 151* 78       Current correctional scale  Low  Maintain anti-hyperglycemic dose as follows-   Antihyperglycemics (From admission, onward)    Start     Stop Route Frequency Ordered    02/25/25 2140  insulin aspart U-100 pen 0-5 Units         -- SubQ Before meals & nightly PRN 02/25/25 2040        Hold Oral hypoglycemics while patient is in the hospital.

## 2025-02-28 NOTE — PROGRESS NOTES
Lukasz Haro - Neurosurgery ([facility])  [facility] Medicine  Progress Note    Patient Name: Jarrett Charlton Jr.  MRN: [id number]  Patient Class: IP- Inpatient   Admission Date: 2/25/2025  Length of Stay: 2 days  Attending Physician: Barndon Mccormack MD  Primary Care Provider: Tripp Mohan MD        Subjective     Principal Problem:Acute renal failure superimposed on stage 4 chronic kidney disease        HPI:  Mr. Charlton is a 74-year-old male with a complex medical history, including CKD stage 4, CAD s/p PCI to mid-LAD in 2017, h/o VT, AT s/p AT ablation in 4/2024, DVT on Eliquis, CHF with EF of 35%, SBO s/p colostomy, HTN, HLD, and DM2. He presented to the ED for evaluation of abnormal labs, which showed worsening renal function. The patient reports a week-long history of hypotension, dizziness, and lightheadedness. He recently visited an urgent care clinic for dysuria and was prescribed Bactrim. He also saw his nephrologist today, where the possibility of HD vs. transplant referral was discussed.    In the ED, the patient was initially hypotensive with BP 78/51, but improved to 107/53 without intervention. Labs were significant for WBC 3.13, Hgb 11.5, Plts 188, CO2 19, BUN 93, Cr 5.3 (baseline 2.4), AST 83, ALT 73, , and Trop 43. CXR showed chronic-appearing interstitial findings with no large focal consolidation. Calcific density along the lateral right upper and mid lung zones suggested pleural calcification or plaques. CT Abd/Pelvis showed no obstructive uropathy and a pancreatic head/uncinate cystic lesion, best evaluated on prior MR imaging. The patient was started on vanc and zosyn for suspected urosepsis and admitted to hospital medicine for further management.    Overview/Hospital Course:  Patient presenting with worsening renal function and hypotension. OP provider recently reduced midodrine dose. Restarted patient on Midodrine 10 TID. Lowered Toprol dose to 12.5 QD. Nephrology following for GRADY on CKD,  likely intrinsic ATN in setting of hypotension. Patient tested positive for Flu A and started on 5 day course of Tamiflu. Encouraging oral hydration with Pedialyte.    Interval History: NAEON. Endorses feeling much better. Repeating orthostatic pressures today.       Objective:     Vital Signs (Most Recent):  Temp: 97.8 °F (36.6 °C) (02/28/25 0728)  Pulse: 86 (02/28/25 0728)  Resp: 16 (02/28/25 0728)  BP: 97/60 (02/28/25 0726)  SpO2: 95 % (02/28/25 0728) Vital Signs (24h Range):  Temp:  [97.8 °F (36.6 °C)-98.7 °F (37.1 °C)] 97.8 °F (36.6 °C)  Pulse:  [] 86  Resp:  [16-20] 16  SpO2:  [95 %-100 %] 95 %  BP: ()/(33-68) 97/60     Weight: 64.4 kg (141 lb 15.6 oz)  Body mass index is 18.23 kg/m².    Intake/Output Summary (Last 24 hours) at 2/28/2025 0831  Last data filed at 2/28/2025 0315  Gross per 24 hour   Intake 1320 ml   Output 1175 ml   Net 145 ml         Physical Exam  Vitals and nursing note reviewed.   Constitutional:       General: He is not in acute distress.     Appearance: Normal appearance. He is ill-appearing.   HENT:      Head: Normocephalic and atraumatic.      Nose: Nose normal.      Mouth/Throat:      Pharynx: Oropharynx is clear.   Eyes:      Extraocular Movements: Extraocular movements intact.      Conjunctiva/sclera: Conjunctivae normal.   Cardiovascular:      Rate and Rhythm: Normal rate and regular rhythm.      Heart sounds: No murmur heard.  Pulmonary:      Effort: Pulmonary effort is normal. No respiratory distress.      Breath sounds: No wheezing or rales.   Chest:      Chest wall: No tenderness.   Abdominal:      General: Abdomen is flat. Bowel sounds are normal. There is no distension.      Palpations: Abdomen is soft.      Tenderness: There is no abdominal tenderness.      Comments: Ostomy with appropriate stool output   Musculoskeletal:         General: No swelling or tenderness. Normal range of motion.      Cervical back: Normal range of motion.      Right lower leg: No edema.       Left lower leg: No edema.   Lymphadenopathy:      Cervical: No cervical adenopathy.   Skin:     General: Skin is warm and dry.      Coloration: Skin is pale. Skin is not jaundiced.      Findings: No bruising.   Neurological:      General: No focal deficit present.      Mental Status: He is alert and oriented to person, place, and time.   Psychiatric:         Mood and Affect: Mood normal.         Behavior: Behavior normal.             Significant Labs: All pertinent labs within the past 24 hours have been reviewed.    Significant Imaging: I have reviewed all pertinent imaging results/findings within the past 24 hours.    Assessment and Plan     * Acute renal failure superimposed on stage 4 chronic kidney disease  GRADY is likely due to acute tubular necrosis caused by hypotension . Baseline creatinine is  2.4 . Most recent creatinine and eGFR are listed below.  Recent Labs     02/26/25  0429 02/27/25  0355 02/28/25  0610   CREATININE 5.6* 5.1* 4.6*   EGFRNORACEVR 9.9* 11.1* 12.6*        Plan  - GRADY is improving  - Avoid nephrotoxins and renally dose meds for GFR listed above  - Monitor urine output, serial BMP, and adjust therapy as needed  - Nephrology consulted, appreciate recs   - Christa 17, UCr 59, Uurea 550, Uosm 350   - FeNa 1.2% (prerenal vs intrinsic)  - FeUrea 55.5% (intrinsic)  - Renal US negative    Orthostatic hypotension  Patient presents with hypotension. On exam, the patient was noted to be normal heart rate, with adequate perfusion, and no signs of shock. Pt reported dizziness, lightheadedness. Blood pressure normalized without any intervention and further monitoring was initiated. Differential diagnosis includes sepsis and heart failure exacerbation.     - Midodrine 10 TID  - Removed fluid restriction, encourage oral hydration  - Pedialyte 200cc q4h  - Strict intake/output    Hepatic steatosis  - Trending LFTs  - Outpatient follow up with Hepatology.      Influenza A  Flu A positive. Not requiring  supplemental oxygen.  - Tamiflu x5 days, renally dosed.      Hypomagnesemia  Patient has Abnormal Magnesium: hypomagnesemia. Will continue to monitor electrolytes closely. Will replace the affected electrolytes and repeat labs to be done after interventions completed. The patient's magnesium results have been reviewed and are listed below.  Recent Labs   Lab 02/28/25  0610   MG 1.8          Altered bowel elimination due to intestinal ostomy  See Colostomy present       Hypercoagulable state due to paroxysmal atrial fibrillation  Patient has paroxysmal (<7 days) atrial fibrillation. Patient is currently in sinus rhythm. [id number] Score: 4. The patients heart rate in the last 24 hours is as follows:  Pulse  Min: 63  Max: 107     Antiarrhythmics  metoprolol succinate (TOPROL-XL) 24 hr split tablet 12.5 mg, Daily, Oral  amiodarone tablet 200 mg, Daily, Oral    Anticoagulants  apixaban tablet 5 mg, 2 times daily, Oral    Plan  - Replete lytes with a goal of K>4, Mg >2  - Patient is anticoagulated, see medications listed above.  - Patient's afib is currently controlled    Body mass index (BMI) less than 19  Will consider nutrition consult.    Sepsis  This patient does have evidence of infective focus  My overall impression is sepsis.  Source: Respiratory  Antibiotics given-   Antibiotics (72h ago, onward)      None          Latest lactate reviewed-  Recent Labs   Lab 02/25/25  1708   POCLAC 1.5       Organ dysfunction indicated by Acute kidney injury    Fluid challenge Contraindicated- Fluid bolus is contraindicated in this patient due to Congestive Heart Failure     Plan:  - Flu A positive  - On Tamiflu x5 days    UTI (urinary tract infection)  See sepsis         Pancreatic mass  Pancreatic head/uncinate process cystic lesion, better evaluated on prior MR imaging.       Thrombocytopenia, unspecified  The likely etiology of thrombocytopenia is infection. The patients 3 most recent labs are listed below.  Recent Labs      02/26/25  0429 02/27/25  0355 02/28/25  0610   * 105* 89*       Plan  - Will transfuse if platelet count is <10k.      Chronic deep vein thrombosis (DVT)  On Eliquis      Chronic combined systolic and diastolic heart failure  Patient has Combined Systolic and Diastolic heart failure that is Chronic. On presentation their CHF was well compensated. Most recent BNP and echo results are listed below.  Recent Labs     02/25/25  1655   *       Latest ECHO  Results for orders placed during the hospital encounter of 02/25/25    Echo    Interpretation Summary    Left Ventricle: The left ventricle is normal in size. Normal wall thickness. There is concentric hypertrophy. Severe global hypokinesis present. There is moderately reduced systolic function with a visually estimated ejection fraction of 30 - 35%. Quantitated ejection fraction is 30%. There is indeterminate diastolic function. No thrombus.    Right Ventricle: The right ventricle is normal in size. Wall thickness is normal. Systolic function is normal.    Left Atrium: The left atrium is mildly dilated.    Mitral Valve: There is mild to moderate regurgitation with a centrally directed jet.    Tricuspid Valve: There is mild regurgitation with a centrally directed jet.    Pulmonary Artery: The estimated pulmonary artery systolic pressure is 33 mmHg.    IVC/SVC: Intermediate venous pressure at 8 mmHg.    Current Heart Failure Medications  metoprolol succinate (TOPROL-XL) 24 hr split tablet 12.5 mg, Daily, Oral    Plan  - Monitor strict I&Os and daily weights.    - Place on telemetry  - Low sodium diet  - Place on fluid restriction of 1.5 L --> holding in setting of orthostatic hypotension  - Cardiology has not been consulted  - The patient's volume status is at their baseline    Elevated troponin  Pt presenting with hypotension. Trop elevated to 43. EKG negative for ST elevation. Pt denies any chest pain. Likely to be demand ischemia in the setting of  hypotension.     - Trop 43 >> 30, stopped trending    Echo  Result Date: 2/26/2025    Left Ventricle: The left ventricle is normal in size. Normal wall   thickness. There is concentric hypertrophy. Severe global hypokinesis   present. There is moderately reduced systolic function with a visually   estimated ejection fraction of 30 - 35%. Quantitated ejection fraction is   30%. There is indeterminate diastolic function. No thrombus.    Right Ventricle: The right ventricle is normal in size. Wall thickness   is normal. Systolic function is normal.    Left Atrium: The left atrium is mildly dilated.    Mitral Valve: There is mild to moderate regurgitation with a centrally   directed jet.    Tricuspid Valve: There is mild regurgitation with a centrally directed   jet.    Pulmonary Artery: The estimated pulmonary artery systolic pressure is   33 mmHg.    IVC/SVC: Intermediate venous pressure at 8 mmHg.          Ileostomy present  Adequate output.      Prostate cancer  S/p TURP  Appears to be on leuprolide injections per MAR    Essential hypertension  Patient's blood pressure range in the last 24 hours was: BP  Min: 76/33  Max: 128/68.The patient's inpatient anti-hypertensive regimen is listed below:  Current Antihypertensives  metoprolol succinate (TOPROL-XL) 24 hr split tablet 12.5 mg, Daily, Oral    Plan  - BP is controlled, no changes needed to their regimen    T2DM (type 2 diabetes mellitus)  Patient's FSGs are controlled on current medication regimen.  Last A1c reviewed-   Lab Results   Component Value Date    HGBA1C 6.6 (H) 02/25/2025     Most recent fingerstick glucose reviewed-   Recent Labs   Lab 02/27/25  1110 02/27/25  1531 02/27/25 2017 02/28/25  0756   POCTGLUCOSE 188* 119* 151* 78       Current correctional scale  Low  Maintain anti-hyperglycemic dose as follows-   Antihyperglycemics (From admission, onward)      Start     Stop Route Frequency Ordered    02/25/25 2140  insulin aspart U-100 pen 0-5 Units          -- SubQ Before meals & nightly PRN 02/25/25 2040          Hold Oral hypoglycemics while patient is in the hospital.      VTE Risk Mitigation (From admission, onward)           Ordered     apixaban tablet 5 mg  2 times daily         02/25/25 2040                    Discharge Planning   JOSÉ MIGUEL: 3/3/2025     Code Status: Full Code   Medical Readiness for Discharge Date:   Discharge Plan A: Home Health                Please place Justification for DME        Jesse Alonzo MD  Department of Hospital Medicine   [facility] - Neurosurgery ([facility]

## 2025-02-28 NOTE — ASSESSMENT & PLAN NOTE
Pt presenting with hypotension. Trop elevated to 43. EKG negative for ST elevation. Pt denies any chest pain. Likely to be demand ischemia in the setting of hypotension.     - Trop 43 >> 30, stopped trending    Echo  Result Date: 2/26/2025    Left Ventricle: The left ventricle is normal in size. Normal wall   thickness. There is concentric hypertrophy. Severe global hypokinesis   present. There is moderately reduced systolic function with a visually   estimated ejection fraction of 30 - 35%. Quantitated ejection fraction is   30%. There is indeterminate diastolic function. No thrombus.    Right Ventricle: The right ventricle is normal in size. Wall thickness   is normal. Systolic function is normal.    Left Atrium: The left atrium is mildly dilated.    Mitral Valve: There is mild to moderate regurgitation with a centrally   directed jet.    Tricuspid Valve: There is mild regurgitation with a centrally directed   jet.    Pulmonary Artery: The estimated pulmonary artery systolic pressure is   33 mmHg.    IVC/SVC: Intermediate venous pressure at 8 mmHg.

## 2025-02-28 NOTE — NURSING
Patients morning metroplol and amiodarone  held due to low BP, team notified. No Orders received.

## 2025-02-28 NOTE — ASSESSMENT & PLAN NOTE
Patient has Combined Systolic and Diastolic heart failure that is Chronic. On presentation their CHF was well compensated. Most recent BNP and echo results are listed below.  Recent Labs     02/25/25  1655   *       Latest ECHO  Results for orders placed during the hospital encounter of 02/25/25    Echo    Interpretation Summary    Left Ventricle: The left ventricle is normal in size. Normal wall thickness. There is concentric hypertrophy. Severe global hypokinesis present. There is moderately reduced systolic function with a visually estimated ejection fraction of 30 - 35%. Quantitated ejection fraction is 30%. There is indeterminate diastolic function. No thrombus.    Right Ventricle: The right ventricle is normal in size. Wall thickness is normal. Systolic function is normal.    Left Atrium: The left atrium is mildly dilated.    Mitral Valve: There is mild to moderate regurgitation with a centrally directed jet.    Tricuspid Valve: There is mild regurgitation with a centrally directed jet.    Pulmonary Artery: The estimated pulmonary artery systolic pressure is 33 mmHg.    IVC/SVC: Intermediate venous pressure at 8 mmHg.    Current Heart Failure Medications  metoprolol succinate (TOPROL-XL) 24 hr split tablet 12.5 mg, Daily, Oral    Plan  - Monitor strict I&Os and daily weights.    - Place on telemetry  - Low sodium diet  - Place on fluid restriction of 1.5 L --> holding in setting of orthostatic hypotension  - Cardiology has not been consulted  - The patient's volume status is at their baseline

## 2025-02-28 NOTE — ASSESSMENT & PLAN NOTE
Patient has Abnormal Magnesium: hypomagnesemia. Will continue to monitor electrolytes closely. Will replace the affected electrolytes and repeat labs to be done after interventions completed. The patient's magnesium results have been reviewed and are listed below.  Recent Labs   Lab 02/28/25  0610   MG 1.8

## 2025-02-28 NOTE — PLAN OF CARE
Goals to be met by: 03/28/25     Patient will increase functional independence with ADLs by performing:    UE Dressing with Modified [place].  LE Dressing with Modified [place].  Grooming while standing at sink with Modified [place].  Toileting from toilet with Modified [place] for hygiene and clothing management.   Toilet transfer to toilet with Modified [place].    DME: No anticipated needs.

## 2025-02-28 NOTE — PLAN OF CARE
Problem: Adult Inpatient Plan of Care  Goal: Plan of Care Review  Outcome: Progressing     Problem: Adult Inpatient Plan of Care  Goal: Absence of Hospital-Acquired Illness or Injury  Outcome: Progressing     Problem: Diabetes Comorbidity  Goal: Blood Glucose Level Within Targeted Range  Outcome: Progressing     Problem: Sepsis/Septic Shock  Goal: Blood Glucose Level Within Targeted Range  Outcome: Progressing     Problem: Wound  Goal: Optimal Coping  Outcome: Progressing     Problem: Wound  Goal: Skin Health and Integrity  Outcome: Progressing     Problem: Fall Injury Risk  Goal: Absence of Fall and Fall-Related Injury  Outcome: Progressing

## 2025-02-28 NOTE — ASSESSMENT & PLAN NOTE
Presented yesterday to [facility] ED for the evaluation of abnormal labs, which shows worsening kidney function. He endorses dizziness, hypotension and lightheadedness for quite sometime now. Recent urinary symptoms which made him to go to urgent care and was treated for UTI with Bactrim. In the ED, he was hypotensive with a BP of 78/51, later 107/53 without intervention. Labs were remarkable for WBC 3.13, Hgb 11.5, Plts 188, CO2 19, BUN 93, Cr 5.3 (baseline 2.5), AST 83, ALT 73, , and Trop 43. Chest xray shows no acute abnormalities. CT Abd/Pelvis showed no obstructive uropathy and a pancreatic head/uncinate cystic lesion. The patient was started on vanc and zosyn for suspected urosepsis and admitted to hospital medicine for further management. ECHO performed 02/26 shows  concentric hypertrophy, severe global hypokinesis, moderately reduced systolic function with an EF of 30 - 35%. Retro US unremarkable, bilateral simple cyst.    Elevated creatinine likely d/o hypotension vs medication    Plan:   Lab Results   Component Value Date    CREATININE 4.6 (H) 02/28/2025     We discussed the likely need for HD in the future with the patient, but at the moment we will monitor closely.  - Agree with midodrine  - Encourage hydration  - Avoid nephrotoxic agents such as NSAIDs, gadolinium and IV radiocontrast.  - Renally dose meds to current GFR.  - Maintain MAP > 65.  - Patient will likely need long time dialysis in the future.  --Daily BMP

## 2025-02-28 NOTE — PROGRESS NOTES
[facility] - Neurosurgery ([facility])  Nephrology  Progress Note    Patient Name: Jarrett Charlton Jr.  MRN: [id number]  Admission Date: 2/25/2025  Hospital Length of Stay: 2 days  Attending Provider: Brandon Mccormack MD   Primary Care Physician: Tripp Mohan MD  Principal Problem:Acute renal failure superimposed on stage 4 chronic kidney disease    Subjective:     HPI: Mr. Zoltan Farias is a 74 y.o male with a PMHx of CKD stage 4, CAD s/p PCI to mid-LAD in 2017, h/o VT, AT s/p AT ablation in 4/2024, DVT on Eliquis, CHF with EF of 35%, SBO s/p colostomy, HTN, HLD, and T2DM. Presented yesterday to [facility] ED for the evaluation of abnormal labs, which shows worsening kidney function. He endorses dizziness, hypotension and lightheadedness for quite sometime now. Recent urinary symptoms which made him to go to urgent care and was treated for UTI with Bactrim.    In the ED, he was hypotensive with a BP of 78/51, later 107/53 without intervention. Labs were remarkable for WBC 3.13, Hgb 11.5, Plts 188, CO2 19, BUN 93, Cr 5.3 (baseline 2.5), AST 83, ALT 73, , and Trop 43. Chest xray shows no acute abnormalities. CT Abd/Pelvis showed no obstructive uropathy and a pancreatic head/uncinate cystic lesion. The patient was started on vanc and zosyn for suspected urosepsis and admitted to hospital medicine for further management. ECHO performed 02/26 shows  concentric hypertrophy, severe global hypokinesis, moderately reduced systolic function with an EF of 30 - 35%.    Nephrology was consulted for management and potential HD    Interval History: No active events overnight. BP still soft. Creatinine improving. Patient likely to need dialysis in the future.    Review of patient's allergies indicates:   Allergen Reactions    Atorvastatin     Rosuvastatin      Current Facility-Administered Medications   Medication Frequency    acetaminophen tablet 1,000 mg Q8H PRN    amiodarone tablet 200 mg Daily    apixaban tablet 5 mg BID     aspirin EC tablet 81 mg Daily    brimonidine 0.2% ophthalmic solution 1 drop BID    dextrose 50% injection 12.5 g PRN    dextrose 50% injection 25 g PRN    electrolytes-dextrose (Pedialyte) oral solution 200 mL Q4H    glucagon (human recombinant) injection 1 mg PRN    glucose chewable tablet 16 g PRN    glucose chewable tablet 24 g PRN    insulin aspart U-100 pen 0-5 Units QID (AC + HS) PRN    metoprolol succinate (TOPROL-XL) 24 hr split tablet 12.5 mg Daily    midodrine tablet 10 mg TID WM    naloxone 0.4 mg/mL injection 0.02 mg PRN    oseltamivir capsule 30 mg Daily    pantoprazole EC tablet 40 mg Daily    pravastatin tablet 40 mg Daily    sodium bicarbonate tablet 1,300 mg BID    sodium chloride 0.9% flush 10 mL Q12H PRN    tamsulosin 24 hr capsule 0.4 mg Daily     Facility-Administered Medications Ordered in Other Encounters   Medication Frequency    sodium chloride 0.9% in 1,000 mL infusion Continuous       Objective:     Vital Signs (Most Recent):  Temp: 98.6 °F (37 °C) (02/28/25 1541)  Pulse: 64 (02/28/25 1541)  Resp: 18 (02/28/25 1541)  BP: (!) 93/59 (02/28/25 1541)  SpO2: 98 % (02/28/25 1541) Vital Signs (24h Range):  Temp:  [97.8 °F (36.6 °C)-98.7 °F (37.1 °C)] 98.6 °F (37 °C)  Pulse:  [] 64  Resp:  [16-20] 18  SpO2:  [95 %-100 %] 98 %  BP: ()/(40-68) 93/59     Weight: 64.4 kg (141 lb 15.6 oz) (02/27/25 1044)  Body mass index is 18.23 kg/m².  Body surface area is 1.83 meters squared.    I/O last 3 completed shifts:  In: 1320 [P.O.:1320]  Out: 1625 [Urine:1050; Stool:575]     Physical Exam  Vitals and nursing note reviewed.   Constitutional:       Appearance: Normal appearance. He is ill-appearing.   HENT:      Nose: No congestion or rhinorrhea.   Cardiovascular:      Rate and Rhythm: Normal rate and regular rhythm.      Pulses: Normal pulses.      Heart sounds: Normal heart sounds.   Pulmonary:      Effort: Pulmonary effort is normal. No respiratory distress.      Breath sounds: Normal breath  sounds. No wheezing.   Abdominal:      General: There is no distension.      Tenderness: There is no abdominal tenderness.   Musculoskeletal:      Right lower leg: No edema.      Left lower leg: No edema.   Skin:     General: Skin is warm and dry.   Neurological:      Mental Status: He is alert and oriented to person, place, and time.   Psychiatric:         Mood and Affect: Mood normal.         Behavior: Behavior normal.          Significant Labs:  All labs within the past 24 hours have been reviewed.     Significant Imaging:     Assessment/Plan:     Renal/  * Acute renal failure superimposed on stage 4 chronic kidney disease  Presented yesterday to [facility] ED for the evaluation of abnormal labs, which shows worsening kidney function. He endorses dizziness, hypotension and lightheadedness for quite sometime now. Recent urinary symptoms which made him to go to urgent care and was treated for UTI with Bactrim. In the ED, he was hypotensive with a BP of 78/51, later 107/53 without intervention. Labs were remarkable for WBC 3.13, Hgb 11.5, Plts 188, CO2 19, BUN 93, Cr 5.3 (baseline 2.5), AST 83, ALT 73, , and Trop 43. Chest xray shows no acute abnormalities. CT Abd/Pelvis showed no obstructive uropathy and a pancreatic head/uncinate cystic lesion. The patient was started on vanc and zosyn for suspected urosepsis and admitted to hospital medicine for further management. ECHO performed 02/26 shows  concentric hypertrophy, severe global hypokinesis, moderately reduced systolic function with an EF of 30 - 35%. Retro US unremarkable, bilateral simple cyst.    Elevated creatinine likely d/o hypotension vs medication    Plan:   Lab Results   Component Value Date    CREATININE 4.6 (H) 02/28/2025     We discussed the likely need for HD in the future with the patient, but at the moment we will monitor closely.  - Agree with midodrine  - Encourage hydration  - Avoid nephrotoxic agents such as NSAIDs, gadolinium and IV  radiocontrast.  - Renally dose meds to current GFR.  - Maintain MAP > 65.  - Patient will likely need long time dialysis in the future.  --Daily BMP          Thank you for your consult. I will follow-up with patient. Please contact us if you have any additional questions.    Sean Sims MD  Nephrology  [facility] - Neurosurgery ([facility])

## 2025-02-28 NOTE — PLAN OF CARE
Problem: Physical Therapy  Goal: Physical Therapy Goal  Description: Goals to be met by: 3/14     Patient will increase functional independence with mobility by performin. Supine to sit with Modified [place]  2. Sit to supine with Modified [place]  3. Sit to stand transfer with Supervision  4. Gait  x 200 feet with Supervision using LRAD or no AD.     Outcome: Progressing   Evaluation completed, initiated plan of care.   Dayanna House, PT  2025

## 2025-02-28 NOTE — PT/OT/SLP EVAL
Physical Therapy Evaluation  Co-evaluation with OT due to acuity of condition, level of skilled assist needed for assessment of safety with mobility.   Patient Name:  Jarrett Charlton Jr.   MRN:  [id number]    Recommendations:     Discharge Recommendations: Moderate Intensity Therapy   Discharge Equipment Recommendations: none   Barriers to discharge: Inaccessible home and Decreased caregiver support, medical instability (mobility limited due to severity of symptomatic orthostatic hypotension)    The patient is safe and appropriate to mobilize with RN staff outside of therapy sessions: The patient is safe to transfer with RN using RW, RW left at bedside. RN, MD, patient alerted that he needs to spend the day up in the chair to mitigate orthostatic hypotension.       Assessment:     Jarrett Charlton Jr. is a 74 y.o. male admitted with a medical diagnosis of Acute renal failure superimposed on stage 4 chronic kidney disease.  He presents with the following impairments/functional limitations: weakness, impaired endurance, impaired self care skills, impaired functional mobility, gait instability, impaired balance, decreased lower extremity function, decreased upper extremity function, impaired coordination, impaired cardiopulmonary response to activity. The patient's mobility is currently limited primarily by symptomatic orthostatic hypotension in standing, generalized weakness and deconditioning due to prolonged bed rest. The patient demonstrates good functional strength with mobility, able to stand and perform step transfer from bed to chair with close contact guard assist. Patient's BP stable and supine and sitting, upon standing, patient with severe dizziness limiting standing tolerance- unable to get BP reading in standing. BP immediately upon sitting from stand was 77/35, MAP 49. Patient reclined in chair, LE elevated, performed AP, after 2 min rest BP returned to /63- RN and MD alerted. Patient, RN and MD  "alerted that patient needs to spend more time out of chair than in bed especially over the weekend- they are in agreement to assist patient with transfers to chair. The patient is not safe to return home, he is unable to mobilize safely due to BP instability and at risk for falls. Patient currently demonstrates a need for moderate intensity therapy on a daily basis post acute secondary to a decline in functional status due to illness     Rehab Prognosis: Good; patient would benefit from acute skilled PT services to address these deficits and reach maximum level of function.    Recent Surgery: * No surgery found *      Plan:     During this hospitalization, patient to be seen 4 x/week to address the identified rehab impairments via gait training, therapeutic activities, therapeutic exercises, neuromuscular re-education and progress toward the following goals:    Plan of Care Expires:  03/30/25    Subjective     Chief Complaint: "They haven't even let me sit up on the edge of the bed since I've been here"  Patient/Family Comments/goals: return to PLOF  Pain/Comfort:  Pain Rating 1: 0/10    Patients cultural, spiritual, Confucianism conflicts given the current situation: no    Living Environment:  The patient lives alone in a [facility], ramp to FIXO, retired . Reports fall in hospital prior to transfer to [facility], tripped over w/c foot rests.   Prior to admission, patients level of function was independent .  Equipment used at home: grab bar (owns SPC, electric scooter, w/c, BSC).  DME owned (not currently used): none.  Upon discharge, patient will have assistance from daughter as needed, lives nearby .    Objective:     Communicated with RN prior to session.  Patient found HOB elevated with peripheral IV  upon PT entry to room.    General Precautions: Standard, fall, droplet (enhanced respiratory)  Orthopedic Precautions:N/A   Braces: N/A  Respiratory Status: Room air    Orthostatic Hypotension positive in " standing        Position Blood Pressure MAP HR (BPM)   HOB elevated 112/68 83 81   Sit  93/56 68 87   Unable to get reading in standing      Sitting immediately following stand 77/35 49 85   Sitting reclined, 2 min ankle pumps 109/63 78 80       Exams:    Cognitive Exam  Patient is A&O x4 and follows 100% of one -step commands    Fine Motor Coordination   -       WFL     Postural Exam Patient presented with the following abnormalities:    -       Rounded shoulders  -       Forward head  -       Kyphosis  -       Posterior pelvic tilt  -       Sensation    -       Light touch intact LUIGI LE   Skin Integrity/Edema     -       Skin integrity: visibly intact,   -       Edema: NA   R LE ROM WFL   R LE Strength 3+/5 hip flexion,4/5 knee ext/flex, and ankle DF/PF   L LE ROM WFL   L LE Strength  3+/5 hip flexion, 4/5 knee ext/flex, and ankle DF/PF       Balance   Static Sitting stand by assistance    Dynamic Sitting stand by assistance to contact guard assist    Static Standing contact guard assist with RW   Dynamic Standing       NA        Functional Mobility:    Bed Mobility  Rolling to R: stand by assistance   Supine to Sit on the R side:  stand by assistance    Transfers Sit to Stand:  contact guard assist with RW from edge of bed   Gait  Gait Distance: 2 ft with Rw  Assistance Level: contact guard assist   Description: forward flexed posture, ambulating outside RW ANTONIA, impaired sequencing with RW progression and stepping, impaired lateral weight shift, decreased step length, short shuffling steps, slow deliberate gait speed           AM-PAC 6 CLICK MOBILITY  Total Score:16       Treatment & Education:  Patient educated on:  -role of therapy  -goals of session  -PT POC  -benefits of out of bed mobility and consequences of immobility  -calling for staff assist to mobilize safely  Patient agreeable to mobilize with therapy.      Patient educated on mechanism of orthostatic hypotension, strategies to mitigate symptoms of  orthostatic hypotension- sitting upright in chair for as much time as possible, pausing with each position change to allow symptoms to pass and performing therex to promote cardiac response to position change. Patient encouraged to request to sit up in chair with RN/PCT assist using RW, RN and MD team also alerted. Patient in agreement and verbalized understanding.     Patient stood ~1min with RW attempting to get BP in standing, patient unable to tolerate prolonged standing due to lightheadedness.     Gait training: cued for upright posture,assist for progressing RW, cued for sequencing with RW progression and stepping, cued to ambulate inside RW ANTONIA, pacing for energy conservation     Patient encouraged to ambulate, sit up in chair 3x/day to prevent deconditioning during hospitalization. Patient verbalized understanding and agreement to mobilize only with RN assist for safety.     Patient left up in chair with all lines intact, call button in reach, chair alarm on, and RN and MD team notified.    GOALS:   Multidisciplinary Problems       Physical Therapy Goals          Problem: Physical Therapy    Goal Priority Disciplines Outcome Interventions   Physical Therapy Goal     PT, PT/OT Progressing    Description: Goals to be met by: 3/14     Patient will increase functional independence with mobility by performin. Supine to sit with Modified [place]  2. Sit to supine with Modified [place]  3. Sit to stand transfer with Supervision  4. Gait  x 200 feet with Supervision using LRAD or no AD.                          DME Justifications:  No DME recommended requiring DME justifications    History:     Past Medical History:   Diagnosis Date    Atrial fibrillation     Anticoagulant long-term use, RVR. s/p AT ablation in 2024,    Autonomic dysfunction 2023 neuro note    Basal cell carcinoma     BPH (benign prostatic hyperplasia)     s/p TURP    CAD (coronary artery disease) 2017    status post  PCI to mid LAD in 2017 (LHC in 2021 showed patent stent, otherwise nonobstructive CAD)    Carotid stenosis     Chronic kidney disease     Claudication     DDD (degenerative disc disease) 10/21/2013    Diabetes mellitus with renal complications     Disc disease, degenerative, cervical     DVT (deep venous thrombosis)     Encounter for blood transfusion     GERD (gastroesophageal reflux disease)     Gout, chronic     History of ulcerative colitis 1982    s/p colectomy and ileostomy    HLD (hyperlipidemia)     HTN (hypertension)     Ileostomy in place 1982    RBBB     Squamous cell carcinoma 03/08/2018    Left superior helix near insertion    Squamous cell carcinoma 04/12/2018    Left forearm x 5    Syncope 07/06/2024    Ventricular tachycardia        Past Surgical History:   Procedure Laterality Date    ABLATION, SVT, ACCESSORY PATHWAY N/A 4/30/2024    Procedure: Ablation, SVT, Accessory Pathway;  Surgeon: Franky Taylor MD;  Location: [facility] EP LAB;  Service: Cardiology;  Laterality: N/A;  VT, RFA, Carto, MAC, GP, 3 Prep *MDT ILR in situ*    cardiac stents      CATARACT EXTRACTION Bilateral     CERVICAL FUSION      CHOLECYSTECTOMY N/A 2/14/2023    Procedure: CHOLECYSTECTOMY;  Surgeon: Mike Joyce MD;  Location: [facility] OR;  Service: General;  Laterality: N/A;  very high probabilty of converison to open    colectomy and ileostomy  1985    ENDOSCOPIC ULTRASOUND OF UPPER GASTROINTESTINAL TRACT N/A 2/10/2023    Procedure: ULTRASOUND, UPPER GI TRACT, ENDOSCOPIC;  Surgeon: Poli Fuller MD;  Location: [facility] ENDO;  Service: Endoscopy;  Laterality: N/A;    ERCP N/A 2/10/2023    Procedure: ERCP (ENDOSCOPIC RETROGRADE CHOLANGIOPANCREATOGRAPHY);  Surgeon: Poli Fuller MD;  Location: [facility] ENDO;  Service: Endoscopy;  Laterality: N/A;    EXCISION OF PAROTID GLAND Left 12/18/2020    Procedure: EXCISION, PAROTID GLAND;  Surgeon: Michael Pinzon MD;  Location: [facility] 2ND FLR;  Service: ENT;  Laterality: Left;    INSERTION  OF IMPLANTABLE LOOP RECORDER  06/07/2021    INSERTION OF IMPLANTABLE LOOP RECORDER Left 6/7/2021    Procedure: INSERTION, IMPLANTABLE LOOP RECORDER;  Surgeon: Romario Dao MD;  [place] [facility] [place];  Service: Cardiology;  Laterality: Left;    LEFT HEART CATHETERIZATION Right 4/15/2021    Procedure: CATHETERIZATION, HEART, LEFT;  Surgeon: Marcio Jones MD;  [place] [facility] [place];  Service: Cardiology;  Laterality: Right;    LYSIS OF ADHESIONS N/A 11/9/2020    Procedure: LYSIS, ADHESIONS,  ERAS low;  Surgeon: CED Haley MD;  Location: [facility] OR 2ND FLR;  Service: Colon and Rectal;  Laterality: N/A;    LYSIS OF ADHESIONS N/A 2/14/2023    Procedure: LYSIS, ADHESIONS;  Surgeon: Mike Joyce MD;  Location: [facility] OR;  Service: General;  Laterality: N/A;    POUCHOSCOPY N/A 4/6/2022    Procedure: ENDOSCOPY, POUCH, SMALL INTESTINE, DIAGNOSTIC;  Surgeon: Dieter Juarez MD;  Location: [facility] ENDO (2ND FLR);  Service: Endoscopy;  Laterality: N/A;    REPAIR, HERNIA, PARASTOMAL N/A 11/9/2020    Procedure: REPAIR, HERNIA, PARASTOMAL;  Surgeon: CED Haley MD;  Location: [facility] OR 2ND FLR;  Service: Colon and Rectal;  Laterality: N/A;    TRANSURETHRAL RESECTION OF PROSTATE (TURP) WITHOUT USE OF LASER N/A 1/23/2019    Procedure: TURP, WITHOUT USING LASER BIPOLAR;  Surgeon: Catarino Mota MD;  Location: [facility] OR 1ST FLR;  Service: Urology;  Laterality: N/A;  1.5 HOURS    TREATMENT OF CARDIAC ARRHYTHMIA  4/30/2024    Procedure: Cardioversion or Defibrillation;  Surgeon: Franky Taylor MD;  Location: [facility] EP LAB;  Service: Cardiology;;       Time Tracking:     PT Received On: 02/28/25  PT Start Time: 0908     PT Stop Time: 0935  PT Total Time (min): 27 min     Billable Minutes: Evaluation 10 and Gait Training 17      02/28/2025

## 2025-02-28 NOTE — SUBJECTIVE & OBJECTIVE
Interval History: NAEON. Endorses feeling much better. Repeating orthostatic pressures today.       Objective:     Vital Signs (Most Recent):  Temp: 97.8 °F (36.6 °C) (02/28/25 0728)  Pulse: 86 (02/28/25 0728)  Resp: 16 (02/28/25 0728)  BP: 97/60 (02/28/25 0726)  SpO2: 95 % (02/28/25 0728) Vital Signs (24h Range):  Temp:  [97.8 °F (36.6 °C)-98.7 °F (37.1 °C)] 97.8 °F (36.6 °C)  Pulse:  [] 86  Resp:  [16-20] 16  SpO2:  [95 %-100 %] 95 %  BP: ()/(33-68) 97/60     Weight: 64.4 kg (141 lb 15.6 oz)  Body mass index is 18.23 kg/m².    Intake/Output Summary (Last 24 hours) at 2/28/2025 0831  Last data filed at 2/28/2025 0315  Gross per 24 hour   Intake 1320 ml   Output 1175 ml   Net 145 ml         Physical Exam  Vitals and nursing note reviewed.   Constitutional:       General: He is not in acute distress.     Appearance: Normal appearance. He is ill-appearing.   HENT:      Head: Normocephalic and atraumatic.      Nose: Nose normal.      Mouth/Throat:      Pharynx: Oropharynx is clear.   Eyes:      Extraocular Movements: Extraocular movements intact.      Conjunctiva/sclera: Conjunctivae normal.   Cardiovascular:      Rate and Rhythm: Normal rate and regular rhythm.      Heart sounds: No murmur heard.  Pulmonary:      Effort: Pulmonary effort is normal. No respiratory distress.      Breath sounds: No wheezing or rales.   Chest:      Chest wall: No tenderness.   Abdominal:      General: Abdomen is flat. Bowel sounds are normal. There is no distension.      Palpations: Abdomen is soft.      Tenderness: There is no abdominal tenderness.      Comments: Ostomy with appropriate stool output   Musculoskeletal:         General: No swelling or tenderness. Normal range of motion.      Cervical back: Normal range of motion.      Right lower leg: No edema.      Left lower leg: No edema.   Lymphadenopathy:      Cervical: No cervical adenopathy.   Skin:     General: Skin is warm and dry.      Coloration: Skin is pale. Skin  is not jaundiced.      Findings: No bruising.   Neurological:      General: No focal deficit present.      Mental Status: He is alert and oriented to person, place, and time.   Psychiatric:         Mood and Affect: Mood normal.         Behavior: Behavior normal.             Significant Labs: All pertinent labs within the past 24 hours have been reviewed.    Significant Imaging: I have reviewed all pertinent imaging results/findings within the past 24 hours.

## 2025-02-28 NOTE — PT/OT/SLP EVAL
Occupational Therapy  Co-  Evaluation and Treatment    Name: Jarrett Charlton Jr.  MRN: [id number]  Admitting Diagnosis: Acute renal failure superimposed on stage 4 chronic kidney disease  Recent Surgery: * No surgery found *      Recommendations:     Discharge Recommendations: Moderate Intensity Therapy  Discharge Equipment Recommendations:  none  Barriers to discharge:  None    Assessment:     Jarrett Charlton Jr. is a 74 y.o. male with a medical diagnosis of Acute renal failure superimposed on stage 4 chronic kidney disease.  He presents with Performance deficits affecting function: impaired functional mobility, impaired endurance, impaired self care skills, decreased safety awareness, gait instability, impaired balance, impaired cardiopulmonary response to activity.  He presents in bed, A&O x4, agreeable to session frustrated by increased time in bed during admission. He demonstrates good performance during mobility and ADLs conducted, but is limited by lightheadedness/dizziness during positional changes with noted orthostatic hypotension per BP reads taken t/o. He was previously IND, resides alone, and is a pet owner and drives self for community access/IADLs. At this time, symptoms greatly impair performance at prior level. Given deficits Pt is a good candidate for moderate intensity post-acute care to address deficits impacting IND and safety in ADLs and functional mobility.     Rehab Prognosis: Good; patient would benefit from acute skilled OT services to address these deficits and reach maximum level of function.       Plan:     Patient to be seen 4 x/week to address the above listed problems via self-care/home management, therapeutic activities, therapeutic exercises, neuromuscular re-education  Plan of Care Expires: 03/28/25  Plan of Care Reviewed with: patient    Subjective     Chief Complaint: not being allowed to get up IND  Patient/Family Comments/goals: To go home    Occupational Profile:  Living  Environment: mobile home, ramp to enter, WIS with Gbs.  Previous level of function: IND, drives, cares for his dog (reports x2 recent falls d/t tripping on w/c leg rest, and dog respectively.  He is a retired .   Equipment Used at Home: none, other (see comments) (owns: SPC, electric scooter, RW, W/C, BSC not used)  Assistance upon Discharge: Dtr lives 3 min away, however works daily.     Pain/Comfort:  Pain Rating 1: 0/10  Pain Rating Post-Intervention 1: 0/10    Orthostatics captured:  HOB elevated 116/68  Seated EOB 93/56  Immediately following stand (seated in chair) 77/35  After ~3m legs elevated, chair reclined, ankle pumps 109/63    Pt's symptoms resolved in seated    Patients cultural, spiritual, Hindu conflicts given the current situation: no    Objective:   Co-treatment performed due to patient's multiple deficits requiring two skilled therapists to appropriately and safely assess patient's strength and endurance while facilitating functional tasks in addition to accommodating for patient's activity tolerance.    Communicated with: Fritz prior to session.  Patient found HOB elevated with Other (comments), peripheral IV (ileostomy) upon OT entry to room.    General Precautions: Standard, fall  Orthopedic Precautions: N/A  Braces: N/A  Respiratory Status: Room air    Occupational Performance:    Bed Mobility:    Patient completed Scooting/Bridging with supervision  Patient completed Supine to Sit with stand by assistance    Functional Mobility/Transfers:  Patient completed Sit <> Stand Transfer with contact guard assistance  with  rolling walker   Patient completed Bed <> Chair Transfer using Step Transfer technique with contact guard assistance with rolling walker  Functional Mobility: Pt stands a couple minutes for BP read, worsening dizziness, efforts to stay still for pressure terminated for safe tsf to chair. Pressure captures once seated with large drop in to 77/35. Accuracy unclear given  movement but given severity of symptoms plausible.     Activities of Daily Living:  Grooming: setup to complete in chair  Upper Body Dressing: setup to don back gown as in button down  Lower Body Dressing: setup to don socks in long sitting.     Cognitive/Visual Perceptual:  Cognitive/Psychosocial Skills:     -       Oriented to: Person, Place, Time, and Situation   -       Follows Commands/attention:Follows multistep  commands  -       Safety awareness/insight to disability: impaired (when symptomatic); otherwise aware of deficits and cautious    Physical Exam: Pt reports R rotator cuff injury d/t fall recently, mild reduction in reach and strength d/t such  Dominant hand:    -       Right   Upper Extremity Range of Motion:     -       Right Upper Extremity: WFL  -       Left Upper Extremity: WFL  Upper Extremity Strength:    -       Right Upper Extremity: WFL  -       Left Upper Extremity: WNL  Fine Motor Coordination:    -       Intact    AMPAC 6 Click ADL:  AMPAC Total Score: 22    Treatment & Education:  -Education on energy conservation and task modification to maximize safety and (I) during ADLs and mobility  -Education on importance of OOB activity to improve overall activity tolerance and promote recovery  -Pt educated to call for assistance and to transfer with hospital staff only  -Provided education regarding role of OT, POC, & discharge recommendations with pt verbalizing understanding.      White board updated with pt level of A, orthostatic warning, OOBTC daily with nsg. Pt encouraged to advocate for OOB tsf daily. RW left in room for pt safety during tsf with nsg staff.     Pt had no further questions & when asked whether there were any concerns pt reported none.     Patient left up in chair with all lines intact, call button in reach, chair alarm on, and nsg notified    GOALS:   Multidisciplinary Problems       Occupational Therapy Goals          Problem: Occupational Therapy    Goal Priority  Disciplines Outcome Interventions   Occupational Therapy Goal     OT, PT/OT Progressing                        DME Justifications:  No DME recommended requiring DME justifications    History:     Past Medical History:   Diagnosis Date    Atrial fibrillation     Anticoagulant long-term use, RVR. s/p AT ablation in 4/2024,    Autonomic dysfunction 11/2023 11/2023 neuro note    Basal cell carcinoma     BPH (benign prostatic hyperplasia)     s/p TURP    CAD (coronary artery disease) 2017    status post PCI to mid LAD in 2017 (LHC in 2021 showed patent stent, otherwise nonobstructive CAD)    Carotid stenosis     Chronic kidney disease     Claudication     DDD (degenerative disc disease) 10/21/2013    Diabetes mellitus with renal complications     Disc disease, degenerative, cervical     DVT (deep venous thrombosis)     Encounter for blood transfusion     GERD (gastroesophageal reflux disease)     Gout, chronic     History of ulcerative colitis 1982    s/p colectomy and ileostomy    HLD (hyperlipidemia)     HTN (hypertension)     Ileostomy in place 1982    RBBB     Squamous cell carcinoma 03/08/2018    Left superior helix near insertion    Squamous cell carcinoma 04/12/2018    Left forearm x 5    Syncope 07/06/2024    Ventricular tachycardia          Past Surgical History:   Procedure Laterality Date    ABLATION, SVT, ACCESSORY PATHWAY N/A 4/30/2024    Procedure: Ablation, SVT, Accessory Pathway;  Surgeon: Franky Taylor MD;  Location: [facility] EP LAB;  Service: Cardiology;  Laterality: N/A;  VT, RFA, Carto, MAC, GP, 3 Prep *MDT ILR in situ*    cardiac stents      CATARACT EXTRACTION Bilateral     CERVICAL FUSION      CHOLECYSTECTOMY N/A 2/14/2023    Procedure: CHOLECYSTECTOMY;  Surgeon: Mike Joyce MD;  Location: [facility] OR;  Service: General;  Laterality: N/A;  very high probabilty of converison to open    colectomy and ileostomy  1985    ENDOSCOPIC ULTRASOUND OF UPPER GASTROINTESTINAL TRACT N/A 2/10/2023     Procedure: ULTRASOUND, UPPER GI TRACT, ENDOSCOPIC;  Surgeon: Poli Fuller MD;  Location: [facility] ENDO;  Service: Endoscopy;  Laterality: N/A;    ERCP N/A 2/10/2023    Procedure: ERCP (ENDOSCOPIC RETROGRADE CHOLANGIOPANCREATOGRAPHY);  Surgeon: Poli Fuller MD;  Location: [facility] ENDO;  Service: Endoscopy;  Laterality: N/A;    EXCISION OF PAROTID GLAND Left 12/18/2020    Procedure: EXCISION, PAROTID GLAND;  Surgeon: Michael Pinzon MD;  Location: [facility] OR 2ND FLR;  Service: ENT;  Laterality: Left;    INSERTION OF IMPLANTABLE LOOP RECORDER  06/07/2021    INSERTION OF IMPLANTABLE LOOP RECORDER Left 6/7/2021    Procedure: INSERTION, IMPLANTABLE LOOP RECORDER;  Surgeon: Romario Dao MD;  Location: [facility] CATH LAB;  Service: Cardiology;  Laterality: Left;    LEFT HEART CATHETERIZATION Right 4/15/2021    Procedure: CATHETERIZATION, HEART, LEFT;  Surgeon: Marcio Jones MD;  Location: [facility] CATH LAB;  Service: Cardiology;  Laterality: Right;    LYSIS OF ADHESIONS N/A 11/9/2020    Procedure: LYSIS, ADHESIONS,  ERAS low;  Surgeon: CED Haley MD;  Location: [facility] OR 2ND FLR;  Service: Colon and Rectal;  Laterality: N/A;    LYSIS OF ADHESIONS N/A 2/14/2023    Procedure: LYSIS, ADHESIONS;  Surgeon: Mike Joyce MD;  Location: [facility] OR;  Service: General;  Laterality: N/A;    POUCHOSCOPY N/A 4/6/2022    Procedure: ENDOSCOPY, POUCH, SMALL INTESTINE, DIAGNOSTIC;  Surgeon: Dieter Juarez MD;  Location: [facility] (2ND FLR);  Service: Endoscopy;  Laterality: N/A;    REPAIR, HERNIA, PARASTOMAL N/A 11/9/2020    Procedure: REPAIR, HERNIA, PARASTOMAL;  Surgeon: CED Haley MD;  Location: [facility] OR 2ND FLR;  Service: Colon and Rectal;  Laterality: N/A;    TRANSURETHRAL RESECTION OF PROSTATE (TURP) WITHOUT USE OF LASER N/A 1/23/2019    Procedure: TURP, WITHOUT USING LASER BIPOLAR;  Surgeon: Catarino Mota MD;  Location: [facility] OR 1ST FLR;  Service: Urology;  Laterality: N/A;  1.5 HOURS    TREATMENT OF CARDIAC  ARRHYTHMIA  4/30/2024    Procedure: Cardioversion or Defibrillation;  Surgeon: Franky Taylor MD;  Location: [facility] EP LAB;  Service: Cardiology;;       Time Tracking:     OT Date of Treatment: 02/28/25  OT Start Time: 0909  OT Stop Time: 0934  OT Total Time (min): 25 min    Billable Minutes:Evaluation 10  Self Care/Home Management 15    2/28/2025

## 2025-02-28 NOTE — ASSESSMENT & PLAN NOTE
Patient has paroxysmal (<7 days) atrial fibrillation. Patient is currently in sinus rhythm. [id number] Score: 4. The patients heart rate in the last 24 hours is as follows:  Pulse  Min: 63  Max: 107     Antiarrhythmics  metoprolol succinate (TOPROL-XL) 24 hr split tablet 12.5 mg, Daily, Oral  amiodarone tablet 200 mg, Daily, Oral    Anticoagulants  apixaban tablet 5 mg, 2 times daily, Oral    Plan  - Replete lytes with a goal of K>4, Mg >2  - Patient is anticoagulated, see medications listed above.  - Patient's afib is currently controlled

## 2025-02-28 NOTE — ASSESSMENT & PLAN NOTE
Patient's blood pressure range in the last 24 hours was: BP  Min: 76/33  Max: 128/68.The patient's inpatient anti-hypertensive regimen is listed below:  Current Antihypertensives  metoprolol succinate (TOPROL-XL) 24 hr split tablet 12.5 mg, Daily, Oral    Plan  - BP is controlled, no changes needed to their regimen

## 2025-02-28 NOTE — PLAN OF CARE
[facility] (H[facility])  Discharge Reassessment    Primary Care Provider: Tripp Mohan MD    Expected Discharge Date: 3/3/2025    Reassessment (most recent)       Discharge Reassessment - 02/28/25 1111          Discharge Reassessment    Assessment Type Discharge Planning Reassessment (P)      Did the patient's condition or plan change since previous assessment? Yes (P)      Discharge Plan discussed with: Adult children (P)      Communicated JOSÉ MIGUEL with patient/caregiver Date not available/Unable to determine (P)      Discharge Plan A Long-term acute care facility (LTAC) (P)      Discharge Plan B Home with family;Home Health (P)      DME Needed Upon Discharge  none (P)      Why the patient remains in the hospital Requires continued medical care (P)         Post-Acute Status    Post-Acute Authorization Placement (P)      Post-Acute Placement Status Pending medical clearance/testing (P)                  Patient is not medically cleared.  Patient being treated for sepsis and rental failure.  ID recs/infectious work up pending.      Discharge Plan A and Plan B have been determined by review of patient's clinical status, future medical and therapeutic needs, and coverage/benefits for post-acute care in coordination with multidisciplinary team members.    Laurence Cordero LMSW  [facility]  [phone]

## 2025-02-28 NOTE — SUBJECTIVE & OBJECTIVE
Interval History: No active events overnight. BP still soft. Creatinine improving. Patient likely to need dialysis in the future.    Review of patient's allergies indicates:   Allergen Reactions    Atorvastatin     Rosuvastatin      Current Facility-Administered Medications   Medication Frequency    acetaminophen tablet 1,000 mg Q8H PRN    amiodarone tablet 200 mg Daily    apixaban tablet 5 mg BID    aspirin EC tablet 81 mg Daily    brimonidine 0.2% ophthalmic solution 1 drop BID    dextrose 50% injection 12.5 g PRN    dextrose 50% injection 25 g PRN    electrolytes-dextrose (Pedialyte) oral solution 200 mL Q4H    glucagon (human recombinant) injection 1 mg PRN    glucose chewable tablet 16 g PRN    glucose chewable tablet 24 g PRN    insulin aspart U-100 pen 0-5 Units QID (AC + HS) PRN    metoprolol succinate (TOPROL-XL) 24 hr split tablet 12.5 mg Daily    midodrine tablet 10 mg TID WM    naloxone 0.4 mg/mL injection 0.02 mg PRN    oseltamivir capsule 30 mg Daily    pantoprazole EC tablet 40 mg Daily    pravastatin tablet 40 mg Daily    sodium bicarbonate tablet 1,300 mg BID    sodium chloride 0.9% flush 10 mL Q12H PRN    tamsulosin 24 hr capsule 0.4 mg Daily     Facility-Administered Medications Ordered in Other Encounters   Medication Frequency    sodium chloride 0.9% in 1,000 mL infusion Continuous       Objective:     Vital Signs (Most Recent):  Temp: 98.6 °F (37 °C) (02/28/25 1541)  Pulse: 64 (02/28/25 1541)  Resp: 18 (02/28/25 1541)  BP: (!) 93/59 (02/28/25 1541)  SpO2: 98 % (02/28/25 1541) Vital Signs (24h Range):  Temp:  [97.8 °F (36.6 °C)-98.7 °F (37.1 °C)] 98.6 °F (37 °C)  Pulse:  [] 64  Resp:  [16-20] 18  SpO2:  [95 %-100 %] 98 %  BP: ()/(40-68) 93/59     Weight: 64.4 kg (141 lb 15.6 oz) (02/27/25 1044)  Body mass index is 18.23 kg/m².  Body surface area is 1.83 meters squared.    I/O last 3 completed shifts:  In: 1320 [P.O.:1320]  Out: 1625 [Urine:1050; Stool:575]     Physical Exam  Vitals  and nursing note reviewed.   Constitutional:       Appearance: Normal appearance. He is ill-appearing.   HENT:      Nose: No congestion or rhinorrhea.   Cardiovascular:      Rate and Rhythm: Normal rate and regular rhythm.      Pulses: Normal pulses.      Heart sounds: Normal heart sounds.   Pulmonary:      Effort: Pulmonary effort is normal. No respiratory distress.      Breath sounds: Normal breath sounds. No wheezing.   Abdominal:      General: There is no distension.      Tenderness: There is no abdominal tenderness.   Musculoskeletal:      Right lower leg: No edema.      Left lower leg: No edema.   Skin:     General: Skin is warm and dry.   Neurological:      Mental Status: He is alert and oriented to person, place, and time.   Psychiatric:         Mood and Affect: Mood normal.         Behavior: Behavior normal.          Significant Labs:  All labs within the past 24 hours have been reviewed.     Significant Imaging:

## 2025-03-01 LAB
ALBUMIN SERPL BCP-MCNC: 2.8 G/DL (ref 3.5–5.2)
ALP SERPL-CCNC: 128 U/L (ref 40–150)
ALT SERPL W/O P-5'-P-CCNC: 76 U/L (ref 10–44)
ANION GAP SERPL CALC-SCNC: 10 MMOL/L (ref 8–16)
AST SERPL-CCNC: 63 U/L (ref 10–40)
BASOPHILS # BLD AUTO: 0.02 K/UL (ref 0–0.2)
BASOPHILS NFR BLD: 0.6 % (ref 0–1.9)
BILIRUB SERPL-MCNC: 0.7 MG/DL (ref 0.1–1)
BUN SERPL-MCNC: 77 MG/DL (ref 8–23)
CALCIUM SERPL-MCNC: 8.5 MG/DL (ref 8.7–10.5)
CHLORIDE SERPL-SCNC: 103 MMOL/L (ref 95–110)
CO2 SERPL-SCNC: 23 MMOL/L (ref 23–29)
CREAT SERPL-MCNC: 4.3 MG/DL (ref 0.5–1.4)
DIFFERENTIAL METHOD BLD: ABNORMAL
EOSINOPHIL # BLD AUTO: 0.1 K/UL (ref 0–0.5)
EOSINOPHIL NFR BLD: 2.9 % (ref 0–8)
ERYTHROCYTE [DISTWIDTH] IN BLOOD BY AUTOMATED COUNT: 18.5 % (ref 11.5–14.5)
EST. GFR  (NO RACE VARIABLE): 13.6 ML/MIN/1.73 M^2
GLUCOSE SERPL-MCNC: 87 MG/DL (ref 70–110)
HCT VFR BLD AUTO: 33.8 % (ref 40–54)
HGB BLD-MCNC: 10.4 G/DL (ref 14–18)
IMM GRANULOCYTES # BLD AUTO: 0.03 K/UL (ref 0–0.04)
IMM GRANULOCYTES NFR BLD AUTO: 1 % (ref 0–0.5)
INR PPP: 1.1 (ref 0.8–1.2)
LYMPHOCYTES # BLD AUTO: 1 K/UL (ref 1–4.8)
LYMPHOCYTES NFR BLD: 31.8 % (ref 18–48)
MAGNESIUM SERPL-MCNC: 1.8 MG/DL (ref 1.6–2.6)
MCH RBC QN AUTO: 27.2 PG (ref 27–31)
MCHC RBC AUTO-ENTMCNC: 30.8 G/DL (ref 32–36)
MCV RBC AUTO: 88 FL (ref 82–98)
MONOCYTES # BLD AUTO: 0.2 K/UL (ref 0.3–1)
MONOCYTES NFR BLD: 7.6 % (ref 4–15)
NEUTROPHILS # BLD AUTO: 1.8 K/UL (ref 1.8–7.7)
NEUTROPHILS NFR BLD: 56.1 % (ref 38–73)
NRBC BLD-RTO: 0 /100 WBC
PHOSPHATE SERPL-MCNC: 3.8 MG/DL (ref 2.7–4.5)
PLATELET # BLD AUTO: 84 K/UL (ref 150–450)
PMV BLD AUTO: ABNORMAL FL (ref 9.2–12.9)
POCT GLUCOSE: 103 MG/DL (ref 70–110)
POCT GLUCOSE: 130 MG/DL (ref 70–110)
POCT GLUCOSE: 135 MG/DL (ref 70–110)
POCT GLUCOSE: 182 MG/DL (ref 70–110)
POTASSIUM SERPL-SCNC: 4.2 MMOL/L (ref 3.5–5.1)
PROT SERPL-MCNC: 5.6 G/DL (ref 6–8.4)
PROTHROMBIN TIME: 12.1 SEC (ref 9–12.5)
RBC # BLD AUTO: 3.83 M/UL (ref 4.6–6.2)
SODIUM SERPL-SCNC: 136 MMOL/L (ref 136–145)
WBC # BLD AUTO: 3.14 K/UL (ref 3.9–12.7)

## 2025-03-01 PROCEDURE — 25000003 PHARM REV CODE 250: Mod: HCNC | Performed by: HOSPITALIST

## 2025-03-01 PROCEDURE — 85025 COMPLETE CBC W/AUTO DIFF WBC: CPT | Mod: HCNC

## 2025-03-01 PROCEDURE — 27000207 HC ISOLATION: Mod: HCNC

## 2025-03-01 PROCEDURE — 25000003 PHARM REV CODE 250: Mod: HCNC

## 2025-03-01 PROCEDURE — 36415 COLL VENOUS BLD VENIPUNCTURE: CPT | Mod: HCNC | Performed by: HOSPITALIST

## 2025-03-01 PROCEDURE — 63600175 PHARM REV CODE 636 W HCPCS: Mod: HCNC

## 2025-03-01 PROCEDURE — 84100 ASSAY OF PHOSPHORUS: CPT | Mod: HCNC

## 2025-03-01 PROCEDURE — 11000001 HC ACUTE MED/SURG PRIVATE ROOM: Mod: HCNC

## 2025-03-01 PROCEDURE — 85610 PROTHROMBIN TIME: CPT | Mod: HCNC | Performed by: HOSPITALIST

## 2025-03-01 PROCEDURE — 83735 ASSAY OF MAGNESIUM: CPT | Mod: HCNC

## 2025-03-01 PROCEDURE — 80053 COMPREHEN METABOLIC PANEL: CPT | Mod: HCNC

## 2025-03-01 RX ORDER — GUAIFENESIN 600 MG/1
600 TABLET, EXTENDED RELEASE ORAL 2 TIMES DAILY
Status: DISCONTINUED | OUTPATIENT
Start: 2025-03-01 | End: 2025-03-04 | Stop reason: HOSPADM

## 2025-03-01 RX ORDER — TALC
6 POWDER (GRAM) TOPICAL NIGHTLY
Status: DISCONTINUED | OUTPATIENT
Start: 2025-03-01 | End: 2025-03-04 | Stop reason: HOSPADM

## 2025-03-01 RX ORDER — SODIUM CHLORIDE, SODIUM LACTATE, POTASSIUM CHLORIDE, CALCIUM CHLORIDE 600; 310; 30; 20 MG/100ML; MG/100ML; MG/100ML; MG/100ML
INJECTION, SOLUTION INTRAVENOUS CONTINUOUS
Status: ACTIVE | OUTPATIENT
Start: 2025-03-01 | End: 2025-03-01

## 2025-03-01 RX ORDER — ELECTROLYTES/DEXTROSE
200 SOLUTION, ORAL ORAL
Status: DISCONTINUED | OUTPATIENT
Start: 2025-03-01 | End: 2025-03-04 | Stop reason: HOSPADM

## 2025-03-01 RX ADMIN — AMIODARONE HYDROCHLORIDE 200 MG: 200 TABLET ORAL at 09:03

## 2025-03-01 RX ADMIN — METOPROLOL SUCCINATE 12.5 MG: 25 TABLET, EXTENDED RELEASE ORAL at 09:03

## 2025-03-01 RX ADMIN — Medication 200 ML: at 11:03

## 2025-03-01 RX ADMIN — APIXABAN 5 MG: 5 TABLET, FILM COATED ORAL at 08:03

## 2025-03-01 RX ADMIN — Medication 200 ML: at 03:03

## 2025-03-01 RX ADMIN — PRAVASTATIN SODIUM 40 MG: 40 TABLET ORAL at 09:03

## 2025-03-01 RX ADMIN — OSELTAMIVIR PHOSPHATE 30 MG: 30 CAPSULE ORAL at 08:03

## 2025-03-01 RX ADMIN — MIDODRINE HYDROCHLORIDE 10 MG: 5 TABLET ORAL at 05:03

## 2025-03-01 RX ADMIN — SODIUM BICARBONATE 650 MG TABLET 1300 MG: at 09:03

## 2025-03-01 RX ADMIN — TAMSULOSIN HYDROCHLORIDE 0.4 MG: 0.4 CAPSULE ORAL at 09:03

## 2025-03-01 RX ADMIN — MIDODRINE HYDROCHLORIDE 10 MG: 5 TABLET ORAL at 11:03

## 2025-03-01 RX ADMIN — APIXABAN 5 MG: 5 TABLET, FILM COATED ORAL at 09:03

## 2025-03-01 RX ADMIN — Medication 6 MG: at 08:03

## 2025-03-01 RX ADMIN — SODIUM CHLORIDE, POTASSIUM CHLORIDE, SODIUM LACTATE AND CALCIUM CHLORIDE: 600; 310; 30; 20 INJECTION, SOLUTION INTRAVENOUS at 12:03

## 2025-03-01 RX ADMIN — GUAIFENESIN 600 MG: 600 TABLET, EXTENDED RELEASE ORAL at 08:03

## 2025-03-01 RX ADMIN — GUAIFENESIN 600 MG: 600 TABLET, EXTENDED RELEASE ORAL at 11:03

## 2025-03-01 RX ADMIN — PANTOPRAZOLE SODIUM 40 MG: 40 TABLET, DELAYED RELEASE ORAL at 09:03

## 2025-03-01 RX ADMIN — SODIUM BICARBONATE 650 MG TABLET 1300 MG: at 08:03

## 2025-03-01 RX ADMIN — Medication 200 ML: at 07:03

## 2025-03-01 RX ADMIN — ASPIRIN 81 MG: 81 TABLET, COATED ORAL at 09:03

## 2025-03-01 RX ADMIN — MIDODRINE HYDROCHLORIDE 10 MG: 5 TABLET ORAL at 06:03

## 2025-03-01 RX ADMIN — BRIMONIDINE TARTRATE 1 DROP: 2 SOLUTION OPHTHALMIC at 09:03

## 2025-03-01 RX ADMIN — BRIMONIDINE TARTRATE 1 DROP: 2 SOLUTION OPHTHALMIC at 08:03

## 2025-03-01 NOTE — ASSESSMENT & PLAN NOTE
The likely etiology of thrombocytopenia is infection. The patients 3 most recent labs are listed below.  Recent Labs     02/27/25  0355 02/28/25  0610 03/01/25  0420   * 89* 84*       Plan  - Will transfuse if platelet count is <10k.

## 2025-03-01 NOTE — ASSESSMENT & PLAN NOTE
GRADY is likely due to acute tubular necrosis caused by hypotension . Baseline creatinine is  2.4 . Most recent creatinine and eGFR are listed below.  Recent Labs     02/27/25  0355 02/28/25  0610 03/01/25  0420   CREATININE 5.1* 4.6* 4.3*   EGFRNORACEVR 11.1* 12.6* 13.6*        Plan  - GRADY is improving  - Avoid nephrotoxins and renally dose meds for GFR listed above  - Monitor urine output, serial BMP, and adjust therapy as needed  - Nephrology consulted, appreciate recs   - Christa 17, UCr 59, Uurea 550, Uosm 350   - FeNa 1.2% (prerenal vs intrinsic)  - FeUrea 55.5% (intrinsic)  - Renal US negative

## 2025-03-01 NOTE — SUBJECTIVE & OBJECTIVE
Interval History: NAEON. Endorses increased mucus production and cough. Added Mucinex for symptomatic relief. Euvolemic.      Objective:     Vital Signs (Most Recent):  Temp: 97.8 °F (36.6 °C) (03/01/25 1200)  Pulse: 87 (03/01/25 1200)  Resp: 18 (03/01/25 1200)  BP: 94/61 (03/01/25 1200)  SpO2: (!) 94 % (03/01/25 1200) Vital Signs (24h Range):  Temp:  [97.6 °F (36.4 °C)-99.7 °F (37.6 °C)] 97.8 °F (36.6 °C)  Pulse:  [60-87] 87  Resp:  [18-20] 18  SpO2:  [94 %-99 %] 94 %  BP: ()/(48-63) 94/61     Weight: 64.4 kg (141 lb 15.6 oz)  Body mass index is 18.23 kg/m².    Intake/Output Summary (Last 24 hours) at 3/1/2025 1202  Last data filed at 3/1/2025 0800  Gross per 24 hour   Intake 200 ml   Output 1050 ml   Net -850 ml         Physical Exam  Vitals and nursing note reviewed.   Constitutional:       General: He is not in acute distress.     Appearance: Normal appearance. He is ill-appearing.   HENT:      Head: Normocephalic and atraumatic.      Nose: Nose normal.      Mouth/Throat:      Pharynx: Oropharynx is clear.   Eyes:      Extraocular Movements: Extraocular movements intact.      Conjunctiva/sclera: Conjunctivae normal.   Cardiovascular:      Rate and Rhythm: Normal rate and regular rhythm.      Heart sounds: No murmur heard.  Pulmonary:      Effort: Pulmonary effort is normal. No respiratory distress.      Breath sounds: No wheezing or rales.   Chest:      Chest wall: No tenderness.   Abdominal:      General: Abdomen is flat. Bowel sounds are normal. There is no distension.      Palpations: Abdomen is soft.      Tenderness: There is no abdominal tenderness.      Comments: Ostomy with appropriate stool output   Musculoskeletal:         General: No swelling or tenderness. Normal range of motion.      Cervical back: Normal range of motion.      Right lower leg: No edema.      Left lower leg: No edema.   Lymphadenopathy:      Cervical: No cervical adenopathy.   Skin:     General: Skin is warm and dry.       Coloration: Skin is pale. Skin is not jaundiced.      Findings: No bruising.   Neurological:      General: No focal deficit present.      Mental Status: He is alert and oriented to person, place, and time.   Psychiatric:         Mood and Affect: Mood normal.         Behavior: Behavior normal.             Significant Labs: All pertinent labs within the past 24 hours have been reviewed.    Significant Imaging: I have reviewed all pertinent imaging results/findings within the past 24 hours.

## 2025-03-01 NOTE — ASSESSMENT & PLAN NOTE
"Patient has Combined Systolic and Diastolic heart failure that is Chronic. On presentation their CHF was well compensated. Most recent BNP and echo results are listed below.  No results for input(s): "BNP" in the last 72 hours.    Latest ECHO  Results for orders placed during the hospital encounter of 02/25/25    Echo    Interpretation Summary    Left Ventricle: The left ventricle is normal in size. Normal wall thickness. There is concentric hypertrophy. Severe global hypokinesis present. There is moderately reduced systolic function with a visually estimated ejection fraction of 30 - 35%. Quantitated ejection fraction is 30%. There is indeterminate diastolic function. No thrombus.    Right Ventricle: The right ventricle is normal in size. Wall thickness is normal. Systolic function is normal.    Left Atrium: The left atrium is mildly dilated.    Mitral Valve: There is mild to moderate regurgitation with a centrally directed jet.    Tricuspid Valve: There is mild regurgitation with a centrally directed jet.    Pulmonary Artery: The estimated pulmonary artery systolic pressure is 33 mmHg.    IVC/SVC: Intermediate venous pressure at 8 mmHg.    Current Heart Failure Medications  metoprolol succinate (TOPROL-XL) 24 hr split tablet 12.5 mg, Daily, Oral    Plan  - Monitor strict I&Os and daily weights.    - Place on telemetry  - Low sodium diet  - Place on fluid restriction of 1.5 L --> holding in setting of orthostatic hypotension  - Cardiology has not been consulted  - The patient's volume status is at their baseline  "

## 2025-03-01 NOTE — ASSESSMENT & PLAN NOTE
Flu A positive. Not requiring supplemental oxygen.  - Tamiflu x5 days, renally dosed.  - Mucinex BID

## 2025-03-01 NOTE — ASSESSMENT & PLAN NOTE
Patient has Abnormal Magnesium: hypomagnesemia. Will continue to monitor electrolytes closely. Will replace the affected electrolytes and repeat labs to be done after interventions completed. The patient's magnesium results have been reviewed and are listed below.  Recent Labs   Lab 03/01/25  0420   MG 1.8

## 2025-03-01 NOTE — PROGRESS NOTES
[facility]  [facility]  [facility] Note    Patient Name: Jarrett Charlton Jr.  MRN: [id number]  Patient Class: IP- Inpatient   Admission Date: 2/25/2025  Length of Stay: 3 days  Attending Physician: Brandon Mccormack MD  Primary Care Provider: Tripp Mohan MD        Subjective     Principal Problem:Acute renal failure superimposed on stage 4 chronic kidney disease        HPI:  Mr. Charlton is a 74-year-old male with a complex medical history, including CKD stage 4, CAD s/p PCI to mid-LAD in 2017, h/o VT, AT s/p AT ablation in 4/2024, DVT on Eliquis, CHF with EF of 35%, SBO s/p colostomy, HTN, HLD, and DM2. He presented to the ED for evaluation of abnormal labs, which showed worsening renal function. The patient reports a week-long history of hypotension, dizziness, and lightheadedness. He recently visited an urgent care clinic for dysuria and was prescribed Bactrim. He also saw his nephrologist today, where the possibility of HD vs. transplant referral was discussed.    In the ED, the patient was initially hypotensive with BP 78/51, but improved to 107/53 without intervention. Labs were significant for WBC 3.13, Hgb 11.5, Plts 188, CO2 19, BUN 93, Cr 5.3 (baseline 2.4), AST 83, ALT 73, , and Trop 43. CXR showed chronic-appearing interstitial findings with no large focal consolidation. Calcific density along the lateral right upper and mid lung zones suggested pleural calcification or plaques. CT Abd/Pelvis showed no obstructive uropathy and a pancreatic head/uncinate cystic lesion, best evaluated on prior MR imaging. The patient was started on vanc and zosyn for suspected urosepsis and admitted to hospital medicine for further management.    Overview/Hospital Course:  Patient presenting with worsening renal function and hypotension. OP provider recently reduced midodrine dose. Restarted patient on Midodrine 10 TID. Lowered Toprol dose to 12.5 QD. Nephrology following for GRADY on CKD,  likely intrinsic ATN in setting of hypotension. Patient tested positive for Flu A and started on 5 day course of Tamiflu. Encouraging oral hydration with Pedialyte. Supplementing with IVF cautiously given his CHF status.    Interval History: NAEON. Endorses increased mucus production and cough. Added Mucinex for symptomatic relief. Euvolemic.      Objective:     Vital Signs (Most Recent):  Temp: 97.8 °F (36.6 °C) (03/01/25 1200)  Pulse: 87 (03/01/25 1200)  Resp: 18 (03/01/25 1200)  BP: 94/61 (03/01/25 1200)  SpO2: (!) 94 % (03/01/25 1200) Vital Signs (24h Range):  Temp:  [97.6 °F (36.4 °C)-99.7 °F (37.6 °C)] 97.8 °F (36.6 °C)  Pulse:  [60-87] 87  Resp:  [18-20] 18  SpO2:  [94 %-99 %] 94 %  BP: ()/(48-63) 94/61     Weight: 64.4 kg (141 lb 15.6 oz)  Body mass index is 18.23 kg/m².    Intake/Output Summary (Last 24 hours) at 3/1/2025 1202  Last data filed at 3/1/2025 0800  Gross per 24 hour   Intake 200 ml   Output 1050 ml   Net -850 ml         Physical Exam  Vitals and nursing note reviewed.   Constitutional:       General: He is not in acute distress.     Appearance: Normal appearance. He is ill-appearing.   HENT:      Head: Normocephalic and atraumatic.      Nose: Nose normal.      Mouth/Throat:      Pharynx: Oropharynx is clear.   Eyes:      Extraocular Movements: Extraocular movements intact.      Conjunctiva/sclera: Conjunctivae normal.   Cardiovascular:      Rate and Rhythm: Normal rate and regular rhythm.      Heart sounds: No murmur heard.  Pulmonary:      Effort: Pulmonary effort is normal. No respiratory distress.      Breath sounds: No wheezing or rales.   Chest:      Chest wall: No tenderness.   Abdominal:      General: Abdomen is flat. Bowel sounds are normal. There is no distension.      Palpations: Abdomen is soft.      Tenderness: There is no abdominal tenderness.      Comments: Ostomy with appropriate stool output   Musculoskeletal:         General: No swelling or tenderness. Normal range of  motion.      Cervical back: Normal range of motion.      Right lower leg: No edema.      Left lower leg: No edema.   Lymphadenopathy:      Cervical: No cervical adenopathy.   Skin:     General: Skin is warm and dry.      Coloration: Skin is pale. Skin is not jaundiced.      Findings: No bruising.   Neurological:      General: No focal deficit present.      Mental Status: He is alert and oriented to person, place, and time.   Psychiatric:         Mood and Affect: Mood normal.         Behavior: Behavior normal.             Significant Labs: All pertinent labs within the past 24 hours have been reviewed.    Significant Imaging: I have reviewed all pertinent imaging results/findings within the past 24 hours.    Assessment and Plan     * Acute renal failure superimposed on stage 4 chronic kidney disease  GRADY is likely due to acute tubular necrosis caused by hypotension . Baseline creatinine is  2.4 . Most recent creatinine and eGFR are listed below.  Recent Labs     02/27/25  0355 02/28/25  0610 03/01/25  0420   CREATININE 5.1* 4.6* 4.3*   EGFRNORACEVR 11.1* 12.6* 13.6*        Plan  - GRADY is improving  - Avoid nephrotoxins and renally dose meds for GFR listed above  - Monitor urine output, serial BMP, and adjust therapy as needed  - Nephrology consulted, appreciate recs   - Christa 17, UCr 59, Uurea 550, Uosm 350   - FeNa 1.2% (prerenal vs intrinsic)  - FeUrea 55.5% (intrinsic)  - Renal US negative    Orthostatic hypotension  Patient presents with hypotension. On exam, the patient was noted to be normal heart rate, with adequate perfusion, and no signs of shock. Pt reported dizziness, lightheadedness. Blood pressure normalized without any intervention and further monitoring was initiated. Differential diagnosis includes sepsis and heart failure exacerbation.     - Midodrine 10 TID  - Removed fluid restriction, encourage oral hydration  - Pedialyte 200cc q4h  - IVF at 100cc/hr x 5hr  - Strict intake/output  - Will need  "outpatient follow up with Cardiology and Neurology to further work up and management hypotension    Hepatic steatosis  - Trending LFTs  - Outpatient follow up with Hepatology.      Influenza A  Flu A positive. Not requiring supplemental oxygen.  - Tamiflu x5 days, renally dosed.  - Mucinex BID      Hypomagnesemia  Patient has Abnormal Magnesium: hypomagnesemia. Will continue to monitor electrolytes closely. Will replace the affected electrolytes and repeat labs to be done after interventions completed. The patient's magnesium results have been reviewed and are listed below.  Recent Labs   Lab 03/01/25  0420   MG 1.8          Altered bowel elimination due to intestinal ostomy  See Colostomy present       Hypercoagulable state due to paroxysmal atrial fibrillation  Patient has paroxysmal (<7 days) atrial fibrillation. Patient is currently in sinus rhythm. [id number] Score: 4. The patients heart rate in the last 24 hours is as follows:  Pulse  Min: 60  Max: 87     Antiarrhythmics  metoprolol succinate (TOPROL-XL) 24 hr split tablet 12.5 mg, Daily, Oral  amiodarone tablet 200 mg, Daily, Oral    Anticoagulants  apixaban tablet 5 mg, 2 times daily, Oral    Plan  - Replete lytes with a goal of K>4, Mg >2  - Patient is anticoagulated, see medications listed above.  - Patient's afib is currently controlled  - Will need outpatient follow up with Cardiology to consider another ablation    Body mass index (BMI) less than 19  Nutrition consulted    Sepsis  This patient does have evidence of infective focus  My overall impression is sepsis.  Source: Respiratory  Antibiotics given-   Antibiotics (72h ago, onward)      None          Latest lactate reviewed-  No results for input(s): "LACTATE", "POCLAC" in the last 72 hours.    Organ dysfunction indicated by Acute kidney injury    Fluid challenge Contraindicated- Fluid bolus is contraindicated in this patient due to Congestive Heart Failure     Plan:  - Flu A positive  - Tamiflu x5 " "days  - Mucinex BID for symptomatic relief    UTI (urinary tract infection)  See sepsis         Pancreatic mass  Pancreatic head/uncinate process cystic lesion, better evaluated on prior MR imaging.       Thrombocytopenia, unspecified  The likely etiology of thrombocytopenia is infection. The patients 3 most recent labs are listed below.  Recent Labs     02/27/25  0355 02/28/25  0610 03/01/25  0420   * 89* 84*       Plan  - Will transfuse if platelet count is <10k.      Moderate malnutrition  Nutrition consulted. Most recent weight and BMI monitored-     Measurements:  Wt Readings from Last 1 Encounters:   02/27/25 64.4 kg (141 lb 15.6 oz)   Body mass index is 18.23 kg/m².    Patient has been screened and assessed by RD.    Malnutrition Type:  Context:    Level:      Malnutrition Characteristic Summary:  Weight Loss (Malnutrition):  (Noted 30 lb weight loss in 3 months)    Interventions/Recommendations (treatment strategy):  Renal non dialysis low sodium 2 gm 1800ml      Chronic deep vein thrombosis (DVT)  On Eliquis      Chronic combined systolic and diastolic heart failure  Patient has Combined Systolic and Diastolic heart failure that is Chronic. On presentation their CHF was well compensated. Most recent BNP and echo results are listed below.  No results for input(s): "BNP" in the last 72 hours.    Latest ECHO  Results for orders placed during the hospital encounter of 02/25/25    Echo    Interpretation Summary    Left Ventricle: The left ventricle is normal in size. Normal wall thickness. There is concentric hypertrophy. Severe global hypokinesis present. There is moderately reduced systolic function with a visually estimated ejection fraction of 30 - 35%. Quantitated ejection fraction is 30%. There is indeterminate diastolic function. No thrombus.    Right Ventricle: The right ventricle is normal in size. Wall thickness is normal. Systolic function is normal.    Left Atrium: The left atrium is mildly " dilated.    Mitral Valve: There is mild to moderate regurgitation with a centrally directed jet.    Tricuspid Valve: There is mild regurgitation with a centrally directed jet.    Pulmonary Artery: The estimated pulmonary artery systolic pressure is 33 mmHg.    IVC/SVC: Intermediate venous pressure at 8 mmHg.    Current Heart Failure Medications  metoprolol succinate (TOPROL-XL) 24 hr split tablet 12.5 mg, Daily, Oral    Plan  - Monitor strict I&Os and daily weights.    - Place on telemetry  - Low sodium diet  - Place on fluid restriction of 1.5 L --> holding in setting of orthostatic hypotension  - Cardiology has not been consulted  - The patient's volume status is at their baseline    Elevated troponin  Pt presenting with hypotension. Trop elevated to 43. EKG negative for ST elevation. Pt denies any chest pain. Likely to be demand ischemia in the setting of hypotension.     - Trop 43 >> 30, stopped trending    Echo  Result Date: 2/26/2025    Left Ventricle: The left ventricle is normal in size. Normal wall   thickness. There is concentric hypertrophy. Severe global hypokinesis   present. There is moderately reduced systolic function with a visually   estimated ejection fraction of 30 - 35%. Quantitated ejection fraction is   30%. There is indeterminate diastolic function. No thrombus.    Right Ventricle: The right ventricle is normal in size. Wall thickness   is normal. Systolic function is normal.    Left Atrium: The left atrium is mildly dilated.    Mitral Valve: There is mild to moderate regurgitation with a centrally   directed jet.    Tricuspid Valve: There is mild regurgitation with a centrally directed   jet.    Pulmonary Artery: The estimated pulmonary artery systolic pressure is   33 mmHg.    IVC/SVC: Intermediate venous pressure at 8 mmHg.          Ileostomy present  Adequate output.      Prostate cancer  S/p TURP  Appears to be on leuprolide injections per MAR    Essential hypertension  Patient's blood  pressure range in the last 24 hours was: BP  Min: 91/50  Max: 106/63.The patient's inpatient anti-hypertensive regimen is listed below:  Current Antihypertensives  metoprolol succinate (TOPROL-XL) 24 hr split tablet 12.5 mg, Daily, Oral    Plan  - BP is controlled, no changes needed to their regimen    T2DM (type 2 diabetes mellitus)  Patient's FSGs are controlled on current medication regimen.  Last A1c reviewed-   Lab Results   Component Value Date    HGBA1C 6.6 (H) 02/25/2025     Most recent fingerstick glucose reviewed-   Recent Labs   Lab 02/28/25  1543 02/28/25  2046 03/01/25  0725   POCTGLUCOSE 148* 119* 103       Current correctional scale  Low  Maintain anti-hyperglycemic dose as follows-   Antihyperglycemics (From admission, onward)      Start     Stop Route Frequency Ordered    02/25/25 2140  insulin aspart U-100 pen 0-5 Units         -- SubQ Before meals & nightly PRN 02/25/25 2040          Hold Oral hypoglycemics while patient is in the hospital.      VTE Risk Mitigation (From admission, onward)           Ordered     apixaban tablet 5 mg  2 times daily         02/25/25 2040                    Discharge Planning   JOSÉ MIGUEL: 3/3/2025     Code Status: Full Code   Medical Readiness for Discharge Date:   Discharge Plan A: Long-term acute care facility (LTAC)              Jesse Alonzo MD  Department of Hospital Medicine   [facility] - Neurosurgery ([facility])

## 2025-03-01 NOTE — PLAN OF CARE
Problem: Adult Inpatient Plan of Care  Goal: Plan of Care Review  Outcome: Progressing  Goal: Patient-Specific Goal (Individualized)  Outcome: Progressing  Goal: Absence of Hospital-Acquired Illness or Injury  Outcome: Progressing  Goal: Optimal Comfort and Wellbeing  Outcome: Progressing  Goal: Readiness for Transition of Care  Outcome: Progressing     Problem: Diabetes Comorbidity  Goal: Blood Glucose Level Within Targeted Range  Outcome: Progressing     Problem: Sepsis/Septic Shock  Goal: Optimal Coping  Outcome: Progressing  Pt resting quietly; alert with no distress this shift. See flowsheet for assessment and VS. Fluids encouraged. Pt voiding with urinal and self care ileostomy. Tele orders in place. Blood glucose monitoring with treatment of hypo/hyperglycemia. Remains on droplet isolation precautions. Fall precautions in place and call light in reach.

## 2025-03-01 NOTE — ASSESSMENT & PLAN NOTE
Patient presents with hypotension. On exam, the patient was noted to be normal heart rate, with adequate perfusion, and no signs of shock. Pt reported dizziness, lightheadedness. Blood pressure normalized without any intervention and further monitoring was initiated. Differential diagnosis includes sepsis and heart failure exacerbation.     - Midodrine 10 TID  - Removed fluid restriction, encourage oral hydration  - Pedialyte 200cc q4h  - IVF at 100cc/hr x 5hr  - Strict intake/output  - Will need outpatient follow up with Cardiology and Neurology to further work up and management hypotension

## 2025-03-01 NOTE — ASSESSMENT & PLAN NOTE
Patient's FSGs are controlled on current medication regimen.  Last A1c reviewed-   Lab Results   Component Value Date    HGBA1C 6.6 (H) 02/25/2025     Most recent fingerstick glucose reviewed-   Recent Labs   Lab 02/28/25  1543 02/28/25 2046 03/01/25  0725   POCTGLUCOSE 148* 119* 103       Current correctional scale  Low  Maintain anti-hyperglycemic dose as follows-   Antihyperglycemics (From admission, onward)    Start     Stop Route Frequency Ordered    02/25/25 2140  insulin aspart U-100 pen 0-5 Units         -- SubQ Before meals & nightly PRN 02/25/25 2040        Hold Oral hypoglycemics while patient is in the hospital.

## 2025-03-01 NOTE — ASSESSMENT & PLAN NOTE
"This patient does have evidence of infective focus  My overall impression is sepsis.  Source: Respiratory  Antibiotics given-   Antibiotics (72h ago, onward)      None          Latest lactate reviewed-  No results for input(s): "LACTATE", "POCLAC" in the last 72 hours.    Organ dysfunction indicated by Acute kidney injury    Fluid challenge Contraindicated- Fluid bolus is contraindicated in this patient due to Congestive Heart Failure     Plan:  - Flu A positive  - Tamiflu x5 days  - Mucinex BID for symptomatic relief  "

## 2025-03-01 NOTE — ASSESSMENT & PLAN NOTE
Patient has paroxysmal (<7 days) atrial fibrillation. Patient is currently in sinus rhythm. [id number] Score: 4. The patients heart rate in the last 24 hours is as follows:  Pulse  Min: 60  Max: 87     Antiarrhythmics  metoprolol succinate (TOPROL-XL) 24 hr split tablet 12.5 mg, Daily, Oral  amiodarone tablet 200 mg, Daily, Oral    Anticoagulants  apixaban tablet 5 mg, 2 times daily, Oral    Plan  - Replete lytes with a goal of K>4, Mg >2  - Patient is anticoagulated, see medications listed above.  - Patient's afib is currently controlled  - Will need outpatient follow up with Cardiology to consider another ablation

## 2025-03-01 NOTE — ASSESSMENT & PLAN NOTE
Patient's blood pressure range in the last 24 hours was: BP  Min: 91/50  Max: 106/63.The patient's inpatient anti-hypertensive regimen is listed below:  Current Antihypertensives  metoprolol succinate (TOPROL-XL) 24 hr split tablet 12.5 mg, Daily, Oral    Plan  - BP is controlled, no changes needed to their regimen

## 2025-03-01 NOTE — ASSESSMENT & PLAN NOTE
Nutrition consulted. Most recent weight and BMI monitored-     Measurements:  Wt Readings from Last 1 Encounters:   02/27/25 64.4 kg (141 lb 15.6 oz)   Body mass index is 18.23 kg/m².    Patient has been screened and assessed by RD.    Malnutrition Type:  Context:    Level:      Malnutrition Characteristic Summary:  Weight Loss (Malnutrition):  (Noted 30 lb weight loss in 3 months)    Interventions/Recommendations (treatment strategy):  Renal non dialysis low sodium 2 gm 1800ml

## 2025-03-02 LAB
ALBUMIN SERPL BCP-MCNC: 3.1 G/DL (ref 3.5–5.2)
ALP SERPL-CCNC: 143 U/L (ref 40–150)
ALT SERPL W/O P-5'-P-CCNC: 70 U/L (ref 10–44)
ANION GAP SERPL CALC-SCNC: 12 MMOL/L (ref 8–16)
AST SERPL-CCNC: 55 U/L (ref 10–40)
BACTERIA BLD CULT: NORMAL
BACTERIA BLD CULT: NORMAL
BASOPHILS # BLD AUTO: 0.01 K/UL (ref 0–0.2)
BASOPHILS NFR BLD: 0.3 % (ref 0–1.9)
BILIRUB SERPL-MCNC: 0.9 MG/DL (ref 0.1–1)
BUN SERPL-MCNC: 63 MG/DL (ref 8–23)
CALCIUM SERPL-MCNC: 8.9 MG/DL (ref 8.7–10.5)
CHLORIDE SERPL-SCNC: 101 MMOL/L (ref 95–110)
CO2 SERPL-SCNC: 22 MMOL/L (ref 23–29)
CREAT SERPL-MCNC: 3.8 MG/DL (ref 0.5–1.4)
DIFFERENTIAL METHOD BLD: ABNORMAL
EOSINOPHIL # BLD AUTO: 0.1 K/UL (ref 0–0.5)
EOSINOPHIL NFR BLD: 2.8 % (ref 0–8)
ERYTHROCYTE [DISTWIDTH] IN BLOOD BY AUTOMATED COUNT: 18.3 % (ref 11.5–14.5)
EST. GFR  (NO RACE VARIABLE): 15.8 ML/MIN/1.73 M^2
GLUCOSE SERPL-MCNC: 114 MG/DL (ref 70–110)
HCT VFR BLD AUTO: 34.9 % (ref 40–54)
HGB BLD-MCNC: 10.9 G/DL (ref 14–18)
IMM GRANULOCYTES # BLD AUTO: 0.01 K/UL (ref 0–0.04)
IMM GRANULOCYTES NFR BLD AUTO: 0.3 % (ref 0–0.5)
LYMPHOCYTES # BLD AUTO: 1.1 K/UL (ref 1–4.8)
LYMPHOCYTES NFR BLD: 33 % (ref 18–48)
MAGNESIUM SERPL-MCNC: 1.7 MG/DL (ref 1.6–2.6)
MCH RBC QN AUTO: 27.7 PG (ref 27–31)
MCHC RBC AUTO-ENTMCNC: 31.2 G/DL (ref 32–36)
MCV RBC AUTO: 89 FL (ref 82–98)
MONOCYTES # BLD AUTO: 0.3 K/UL (ref 0.3–1)
MONOCYTES NFR BLD: 8 % (ref 4–15)
NEUTROPHILS # BLD AUTO: 1.8 K/UL (ref 1.8–7.7)
NEUTROPHILS NFR BLD: 55.6 % (ref 38–73)
NRBC BLD-RTO: 0 /100 WBC
PHOSPHATE SERPL-MCNC: 3.3 MG/DL (ref 2.7–4.5)
PLATELET # BLD AUTO: 81 K/UL (ref 150–450)
PMV BLD AUTO: ABNORMAL FL (ref 9.2–12.9)
POCT GLUCOSE: 109 MG/DL (ref 70–110)
POCT GLUCOSE: 112 MG/DL (ref 70–110)
POCT GLUCOSE: 116 MG/DL (ref 70–110)
POCT GLUCOSE: 187 MG/DL (ref 70–110)
POTASSIUM SERPL-SCNC: 4.5 MMOL/L (ref 3.5–5.1)
PROT SERPL-MCNC: 6.1 G/DL (ref 6–8.4)
RBC # BLD AUTO: 3.93 M/UL (ref 4.6–6.2)
SODIUM SERPL-SCNC: 135 MMOL/L (ref 136–145)
WBC # BLD AUTO: 3.27 K/UL (ref 3.9–12.7)

## 2025-03-02 PROCEDURE — 83735 ASSAY OF MAGNESIUM: CPT | Mod: HCNC

## 2025-03-02 PROCEDURE — 25000003 PHARM REV CODE 250: Mod: HCNC

## 2025-03-02 PROCEDURE — 85025 COMPLETE CBC W/AUTO DIFF WBC: CPT | Mod: HCNC

## 2025-03-02 PROCEDURE — 25000003 PHARM REV CODE 250: Mod: HCNC | Performed by: HOSPITALIST

## 2025-03-02 PROCEDURE — 63600175 PHARM REV CODE 636 W HCPCS: Mod: HCNC

## 2025-03-02 PROCEDURE — 27000207 HC ISOLATION: Mod: HCNC

## 2025-03-02 PROCEDURE — 36415 COLL VENOUS BLD VENIPUNCTURE: CPT | Mod: HCNC

## 2025-03-02 PROCEDURE — 11000001 HC ACUTE MED/SURG PRIVATE ROOM: Mod: HCNC

## 2025-03-02 PROCEDURE — 80053 COMPREHEN METABOLIC PANEL: CPT | Mod: HCNC

## 2025-03-02 PROCEDURE — 84100 ASSAY OF PHOSPHORUS: CPT | Mod: HCNC

## 2025-03-02 RX ORDER — SODIUM CHLORIDE, SODIUM LACTATE, POTASSIUM CHLORIDE, CALCIUM CHLORIDE 600; 310; 30; 20 MG/100ML; MG/100ML; MG/100ML; MG/100ML
INJECTION, SOLUTION INTRAVENOUS CONTINUOUS
Status: ACTIVE | OUTPATIENT
Start: 2025-03-02 | End: 2025-03-02

## 2025-03-02 RX ADMIN — Medication 6 MG: at 09:03

## 2025-03-02 RX ADMIN — MIDODRINE HYDROCHLORIDE 10 MG: 5 TABLET ORAL at 04:03

## 2025-03-02 RX ADMIN — METOPROLOL SUCCINATE 12.5 MG: 25 TABLET, EXTENDED RELEASE ORAL at 10:03

## 2025-03-02 RX ADMIN — Medication 200 ML: at 07:03

## 2025-03-02 RX ADMIN — Medication 200 ML: at 11:03

## 2025-03-02 RX ADMIN — GUAIFENESIN 600 MG: 600 TABLET, EXTENDED RELEASE ORAL at 09:03

## 2025-03-02 RX ADMIN — BRIMONIDINE TARTRATE 1 DROP: 2 SOLUTION OPHTHALMIC at 09:03

## 2025-03-02 RX ADMIN — PANTOPRAZOLE SODIUM 40 MG: 40 TABLET, DELAYED RELEASE ORAL at 10:03

## 2025-03-02 RX ADMIN — MIDODRINE HYDROCHLORIDE 10 MG: 5 TABLET ORAL at 11:03

## 2025-03-02 RX ADMIN — SODIUM BICARBONATE 650 MG TABLET 1300 MG: at 10:03

## 2025-03-02 RX ADMIN — SODIUM CHLORIDE, POTASSIUM CHLORIDE, SODIUM LACTATE AND CALCIUM CHLORIDE: 600; 310; 30; 20 INJECTION, SOLUTION INTRAVENOUS at 11:03

## 2025-03-02 RX ADMIN — Medication 200 ML: at 03:03

## 2025-03-02 RX ADMIN — APIXABAN 5 MG: 5 TABLET, FILM COATED ORAL at 09:03

## 2025-03-02 RX ADMIN — ASPIRIN 81 MG: 81 TABLET, COATED ORAL at 10:03

## 2025-03-02 RX ADMIN — APIXABAN 5 MG: 5 TABLET, FILM COATED ORAL at 10:03

## 2025-03-02 RX ADMIN — SODIUM BICARBONATE 650 MG TABLET 1300 MG: at 09:03

## 2025-03-02 RX ADMIN — MIDODRINE HYDROCHLORIDE 10 MG: 5 TABLET ORAL at 06:03

## 2025-03-02 RX ADMIN — PRAVASTATIN SODIUM 40 MG: 40 TABLET ORAL at 10:03

## 2025-03-02 RX ADMIN — TAMSULOSIN HYDROCHLORIDE 0.4 MG: 0.4 CAPSULE ORAL at 10:03

## 2025-03-02 RX ADMIN — OSELTAMIVIR PHOSPHATE 30 MG: 30 CAPSULE ORAL at 09:03

## 2025-03-02 RX ADMIN — AMIODARONE HYDROCHLORIDE 200 MG: 200 TABLET ORAL at 10:03

## 2025-03-02 RX ADMIN — ACETAMINOPHEN 1000 MG: 500 TABLET ORAL at 11:03

## 2025-03-02 RX ADMIN — GUAIFENESIN 600 MG: 600 TABLET, EXTENDED RELEASE ORAL at 10:03

## 2025-03-02 NOTE — CONSULTS
[facility] - Neurosurgery ([facility])  Adult Nutrition  Consult Note    SUMMARY     Recommendations    1. Recommend diet liberlization to regular diet to increase PO intake   2. Recommend boost glucose control TID   3. d/c novasource   4. Encourage good intake   5. RD to monitor weight, labs, intake    Goals:   1. % nutritional needs met with diet   2. Maintain weight during admission  Nutrition Goal Status: progressing towards goal  Communication of RD Recs: other (comment) (POC)    Nutrition Discharge Planning     Nutrition Discharge Planning: Therapeutic diet (comments), Oral supplement regimen (comments)  Therapeutic diet (comments): diabetic renal diet  Oral supplement regimen (comments): boost glucose control    Assessment and Plan    Endocrine  Moderate protein-calorie malnutrition  Malnutrition Type:  Context: chronic illness  Level: moderate    Related to (etiology):   Decreased appetite/poor intake    Signs and Symptoms (as evidenced by):   Significant wt loss with muscle and fat depletion      Malnutrition Characteristic Summary:  Weight Loss (Malnutrition): greater than 7.5% in 3 months (27 lbs/16.1% wt loss x 3 months)  Energy Intake (Malnutrition): less than 75% for greater than or equal to 3 months  Subcutaneous Fat (Malnutrition): moderate depletion  Muscle Mass (Malnutrition): moderate depletion    Interventions/Recommendations (treatment strategy):  Collaboration with other providers    Nutrition Diagnosis Status:   New       Nutrition Problem  Underweight      Related to (etiology):   Inadequate energy intake      Signs and Symptoms (as evidenced by):   Noted 30 lb weight loss in 3 months   Possible HD candidate       Interventions:  Collaboration by nutrition professional with other providers   Commercial beverage medical food supplement- Novasource Renal daily      Nutrition Diagnosis Status:   Continues     Malnutrition Assessment    Malnutrition Context: chronic illness  Malnutrition  Level: moderate  Skin (Micronutrient): bruised, cracked, dry, rough       Weight Loss (Malnutrition): greater than 7.5% in 3 months (27 lbs/16.1% wt loss x 3 months)  Energy Intake (Malnutrition): less than 75% for greater than or equal to 3 months  Subcutaneous Fat (Malnutrition): moderate depletion  Muscle Mass (Malnutrition): moderate depletion   Orbital Region (Subcutaneous Fat Loss): mild depletion  Upper Arm Region (Subcutaneous Fat Loss): moderate depletion  Thoracic and Lumbar Region: moderate depletion   Zoroastrian Region (Muscle Loss): moderate depletion  Clavicle Bone Region (Muscle Loss): moderate depletion  Clavicle and Acromion Bone Region (Muscle Loss): moderate depletion  Dorsal Hand (Muscle Loss): moderate depletion  Patellar Region (Muscle Loss): moderate depletion  Anterior Thigh Region (Muscle Loss): moderate depletion  Posterior Calf Region (Muscle Loss): moderate depletion     Reason for Assessment    Reason For Assessment: consult (multiorgan failure with BMI 18)  Diagnosis: renal disease (Acute renal failure superimposed on stage 4 chronic kidney disease)  General Information Comments: Pt admitted with acute renal failure superimposed on stage 4 chronic kidney disease. PMHx: HTN, gout, HLD, BPH, DDD, ventricular tachycardia, carotid stenosis, basal cell carcinoma, squamous cell carcinoma, GERD, DM 2 w/ renal complications, CKD, a-fib, DVT, CAD. No recent surgical hx. No edema. Wounds: abrasion on left leg, wound on buttocks, wound on left lower quadrant, skin tear on left arm. Pt reported a poor appetite - PO: 0-25%. Pt said he doesn't like the food and is tired of the same thing given. Pt stated he drinks boost glucose control at home. Pt has ileostomy. No GI s/s - BM: 3/2.    Nutrition/Diet History    Nutrition Intake History: 2 meals and snacks  Food Preferences: likes italian ice and orange Pedialyte  Spiritual, Cultural Beliefs, Catholic Practices, Values that Affect Care: no  Food  "Allergies: NKFA  Factors Affecting Nutritional Intake: decreased appetite, other (see comments) (bloating)    Nutrition Related Social Determinants of Health: SDOH: Adequate food in home environment    Food Insecurity: No Food Insecurity (2025)    Hunger Vital Sign     Worried About Running Out of Food in the Last Year: Never true     Ran Out of Food in the Last Year: Never true     Anthropometrics    Height: 6' 2" (188 cm)  Height (inches): 74 in  Height Method: Stated  Weight: 64.4 kg (141 lb 15.6 oz)  Weight (lb): 141.98 lb  Weight Method: Bed Scale  Ideal Body Weight (IBW), Male: 190 lb  % Ideal Body Weight, Male (lb): 74.73 %  BMI (Calculated): 18.2  BMI Grade: less than 23 (older than 65 years) - underweight  Usual Body Weight (UBW), k.5 kg  % Usual Body Weight: 67.58  % Weight Change From Usual Weight: -17.65 %    Lab/Procedures/Meds    Pertinent Labs Reviewed: reviewed  Pertinent Labs Comments: H/H 10.9/34.9 low, Na 135 low, BUN 63 high, creatinine 3.8 high, GFR 15.8 low, glucose 114 high,albumin 3.1 low, AST 55 high, ALT 70 high, A1C 6.6 high (25)  Pertinent Medications Reviewed: reviewed  Pertinent Medications Comments: amiodarone, apixaban, aspirin, Pedialyte, guaifenesin, metoprolol, midodrine, oseltamivir, pantoprazole, pravastatin, sodium bicarbonate    Estimated/Assessed Needs    Weight Used For Calorie Calculations: 64.4 kg (141 lb 15.6 oz)  Energy Calorie Requirements (kcal): 1884 kcal  Energy Need Method: Cecil-St Jeor (* 1.3)  Protein Requirements: 39-52 g/pro (0.6-0.8 g/kg)  Weight Used For Protein Calculations: 64.4 kg (141 lb 15.6 oz)     Estimated Fluid Requirement Method: RDA Method  RDA Method (mL): 1884  CHO Requirement: 236 g    Nutrition Prescription Ordered    Current Diet Order: cardiac low Na  Oral Nutrition Supplement: novasource once daily    Evaluation of Received Nutrient/Fluid Intake    Energy Calories Required: not meeting needs  Protein Required: not meeting " needs  Comments: LBM-2/26/25  Tolerance: tolerating  % Intake of Estimated Energy Needs: 0 - 25 %  % Meal Intake: 0 - 25 %    Nutrition Risk    Level of Risk/Frequency of Follow-up: moderate     Monitor and Evaluation    Monitor and Evaluation: Energy intake, Protein intake, Food and beverage intake, Carbohydrate intake, Diet order, Food and nutrition knowledge, Weight, Electrolyte and renal panel, Gastrointestinal profile, Glucose/endocrine profile, Inflammatory profile, Lipid profile, Nutrition focused physical findings     Nutrition Follow-Up    RD Follow-up?: Yes

## 2025-03-02 NOTE — ASSESSMENT & PLAN NOTE
Patient presents with hypotension. On exam, the patient was noted to be normal heart rate, with adequate perfusion, and no signs of shock. Pt reported dizziness, lightheadedness. Blood pressure normalized without any intervention and further monitoring was initiated. Differential diagnosis includes sepsis and heart failure exacerbation.     - Midodrine 10 TID  - Removed fluid restriction, encourage oral hydration  - Pedialyte 200cc q4h  - IVF to supplement oral hydration  - Strict intake/output  - Will need outpatient follow up with Cardiology and Neurology to further work up and management hypotension

## 2025-03-02 NOTE — ASSESSMENT & PLAN NOTE
GRADY is likely due to acute tubular necrosis caused by hypotension . Baseline creatinine is  2.4 . Most recent creatinine and eGFR are listed below.  Recent Labs     02/28/25  0610 03/01/25  0420   CREATININE 4.6* 4.3*   EGFRNORACEVR 12.6* 13.6*        Plan  - GRADY is improving  - Avoid nephrotoxins and renally dose meds for GFR listed above  - Monitor urine output, serial BMP, and adjust therapy as needed  - Nephrology consulted, appreciate recs   - Christa 17, UCr 59, Uurea 550, Uosm 350   - FeNa 1.2% (prerenal vs intrinsic)  - FeUrea 55.5% (intrinsic)  - Renal US negative

## 2025-03-02 NOTE — ASSESSMENT & PLAN NOTE
Patient's FSGs are controlled on current medication regimen.  Last A1c reviewed-   Lab Results   Component Value Date    HGBA1C 6.6 (H) 02/25/2025     Most recent fingerstick glucose reviewed-   Recent Labs   Lab 03/01/25  1159 03/01/25  1810 03/01/25 2038 03/02/25  0735   POCTGLUCOSE 130* 135* 182* 112*       Current correctional scale  Low  Maintain anti-hyperglycemic dose as follows-   Antihyperglycemics (From admission, onward)    Start     Stop Route Frequency Ordered    02/25/25 2140  insulin aspart U-100 pen 0-5 Units         -- SubQ Before meals & nightly PRN 02/25/25 2040        Hold Oral hypoglycemics while patient is in the hospital.

## 2025-03-02 NOTE — PROGRESS NOTES
[facility] - Neurosurgery ([facility])  [facility] Medicine  Progress Note    Patient Name: Jarrett Charlton Jr.  MRN: [id number]  Patient Class: IP- Inpatient   Admission Date: 2/25/2025  Length of Stay: 4 days  Attending Physician: Brandon Mccormack MD  Primary Care Provider: Tripp Mohan MD        Subjective     Principal Problem:Acute renal failure superimposed on stage 4 chronic kidney disease        HPI:  Mr. Charlton is a 74-year-old male with a complex medical history, including CKD stage 4, CAD s/p PCI to mid-LAD in 2017, h/o VT, AT s/p AT ablation in 4/2024, DVT on Eliquis, CHF with EF of 35%, SBO s/p colostomy, HTN, HLD, and DM2. He presented to the ED for evaluation of abnormal labs, which showed worsening renal function. The patient reports a week-long history of hypotension, dizziness, and lightheadedness. He recently visited an urgent care clinic for dysuria and was prescribed Bactrim. He also saw his nephrologist today, where the possibility of HD vs. transplant referral was discussed.    In the ED, the patient was initially hypotensive with BP 78/51, but improved to 107/53 without intervention. Labs were significant for WBC 3.13, Hgb 11.5, Plts 188, CO2 19, BUN 93, Cr 5.3 (baseline 2.4), AST 83, ALT 73, , and Trop 43. CXR showed chronic-appearing interstitial findings with no large focal consolidation. Calcific density along the lateral right upper and mid lung zones suggested pleural calcification or plaques. CT Abd/Pelvis showed no obstructive uropathy and a pancreatic head/uncinate cystic lesion, best evaluated on prior MR imaging. The patient was started on vanc and zosyn for suspected urosepsis and admitted to hospital medicine for further management.    Overview/Hospital Course:  Patient presenting with worsening renal function and hypotension. OP provider recently reduced midodrine dose. Restarted patient on Midodrine 10 TID. Lowered Toprol dose to 12.5 QD. Nephrology following for GRADY on CKD,  likely intrinsic ATN in setting of hypotension. Patient tested positive for Flu A and started on 5 day course of Tamiflu. Encouraging oral hydration with Pedialyte. Supplementing with IVF cautiously given his CHF status.    Interval History: NAEON. No complaints at this time. Euvolemic status.      Objective:     Vital Signs (Most Recent):  Temp: 98.1 °F (36.7 °C) (03/02/25 0734)  Pulse: 78 (03/02/25 0734)  Resp: 18 (03/02/25 0734)  BP: 120/68 (03/02/25 0734)  SpO2: 98 % (03/02/25 0734) Vital Signs (24h Range):  Temp:  [97.6 °F (36.4 °C)-98.1 °F (36.7 °C)] 98.1 °F (36.7 °C)  Pulse:  [56-90] 78  Resp:  [16-18] 18  SpO2:  [94 %-100 %] 98 %  BP: ()/(53-68) 120/68     Weight: 64.4 kg (141 lb 15.6 oz)  Body mass index is 18.23 kg/m².    Intake/Output Summary (Last 24 hours) at 3/2/2025 0859  Last data filed at 3/2/2025 0800  Gross per 24 hour   Intake 670 ml   Output 1775 ml   Net -1105 ml         Physical Exam  Vitals and nursing note reviewed.   Constitutional:       General: He is not in acute distress.     Appearance: Normal appearance. He is ill-appearing.   HENT:      Head: Normocephalic and atraumatic.      Nose: Nose normal.      Mouth/Throat:      Pharynx: Oropharynx is clear.   Eyes:      Extraocular Movements: Extraocular movements intact.      Conjunctiva/sclera: Conjunctivae normal.   Cardiovascular:      Rate and Rhythm: Normal rate and regular rhythm.      Heart sounds: No murmur heard.  Pulmonary:      Effort: Pulmonary effort is normal. No respiratory distress.      Breath sounds: No wheezing or rales.   Chest:      Chest wall: No tenderness.   Abdominal:      General: Abdomen is flat. Bowel sounds are normal. There is no distension.      Palpations: Abdomen is soft.      Tenderness: There is no abdominal tenderness.      Comments: Ostomy with appropriate stool output   Musculoskeletal:         General: No swelling or tenderness. Normal range of motion.      Cervical back: Normal range of motion.       Right lower leg: No edema.      Left lower leg: No edema.   Lymphadenopathy:      Cervical: No cervical adenopathy.   Skin:     General: Skin is warm and dry.      Coloration: Skin is pale. Skin is not jaundiced.      Findings: Bruising present.   Neurological:      General: No focal deficit present.      Mental Status: He is alert and oriented to person, place, and time.   Psychiatric:         Mood and Affect: Mood normal.         Behavior: Behavior normal.             Significant Labs: All pertinent labs within the past 24 hours have been reviewed.    Significant Imaging: I have reviewed all pertinent imaging results/findings within the past 24 hours.    Assessment and Plan     * Acute renal failure superimposed on stage 4 chronic kidney disease  GRADY is likely due to acute tubular necrosis caused by hypotension . Baseline creatinine is  2.4 . Most recent creatinine and eGFR are listed below.  Recent Labs     02/28/25  0610 03/01/25  0420   CREATININE 4.6* 4.3*   EGFRNORACEVR 12.6* 13.6*        Plan  - GRADY is improving  - Avoid nephrotoxins and renally dose meds for GFR listed above  - Monitor urine output, serial BMP, and adjust therapy as needed  - Nephrology consulted, appreciate recs   - Christa 17, UCr 59, Uurea 550, Uosm 350   - FeNa 1.2% (prerenal vs intrinsic)  - FeUrea 55.5% (intrinsic)  - Renal US negative    Orthostatic hypotension  Patient presents with hypotension. On exam, the patient was noted to be normal heart rate, with adequate perfusion, and no signs of shock. Pt reported dizziness, lightheadedness. Blood pressure normalized without any intervention and further monitoring was initiated. Differential diagnosis includes sepsis and heart failure exacerbation.     - Midodrine 10 TID  - Removed fluid restriction, encourage oral hydration  - Pedialyte 200cc q4h  - IVF to supplement oral hydration  - Strict intake/output  - Will need outpatient follow up with Cardiology and Neurology to further work  "up and management hypotension    Hepatic steatosis  - Trending LFTs  - Outpatient follow up with Hepatology.      Influenza A  Flu A positive. Not requiring supplemental oxygen.  - Tamiflu x5 days, renally dosed.  - Mucinex BID      Hypomagnesemia  Patient has Abnormal Magnesium: hypomagnesemia. Will continue to monitor electrolytes closely. Will replace the affected electrolytes and repeat labs to be done after interventions completed. The patient's magnesium results have been reviewed and are listed below.  No results for input(s): "MG" in the last 24 hours.       Altered bowel elimination due to intestinal ostomy  See Colostomy present       Hypercoagulable state due to paroxysmal atrial fibrillation  Patient has paroxysmal (<7 days) atrial fibrillation. Patient is currently in sinus rhythm. [id number] Score: 4. The patients heart rate in the last 24 hours is as follows:  Pulse  Min: 56  Max: 90     Antiarrhythmics  metoprolol succinate (TOPROL-XL) 24 hr split tablet 12.5 mg, Daily, Oral  amiodarone tablet 200 mg, Daily, Oral    Anticoagulants  apixaban tablet 5 mg, 2 times daily, Oral    Plan  - Replete lytes with a goal of K>4, Mg >2  - Patient is anticoagulated, see medications listed above.  - Patient's afib is currently controlled  - Will need outpatient follow up with Cardiology to consider another ablation    Body mass index (BMI) less than 19  Nutrition consulted    Sepsis  This patient does have evidence of infective focus  My overall impression is sepsis.  Source: Respiratory  Antibiotics given-   Antibiotics (72h ago, onward)      None          Latest lactate reviewed-  No results for input(s): "LACTATE", "POCLAC" in the last 72 hours.    Organ dysfunction indicated by Acute kidney injury    Fluid challenge Contraindicated- Fluid bolus is contraindicated in this patient due to Congestive Heart Failure     Plan:  - Flu A positive  - Tamiflu x5 days  - Mucinex BID for symptomatic relief    UTI (urinary " "tract infection)  See sepsis         Pancreatic mass  Pancreatic head/uncinate process cystic lesion, better evaluated on prior MR imaging.       Thrombocytopenia, unspecified  The likely etiology of thrombocytopenia is infection. The patients 3 most recent labs are listed below.  Recent Labs     02/28/25  0610 03/01/25  0420   PLT 89* 84*       Plan  - Will transfuse if platelet count is <10k.      Moderate malnutrition  Nutrition consulted. Most recent weight and BMI monitored-     Measurements:  Wt Readings from Last 1 Encounters:   02/27/25 64.4 kg (141 lb 15.6 oz)   Body mass index is 18.23 kg/m².    Patient has been screened and assessed by RD.    Malnutrition Type:  Context:    Level:      Malnutrition Characteristic Summary:  Weight Loss (Malnutrition):  (Noted 30 lb weight loss in 3 months)    Interventions/Recommendations (treatment strategy):  Renal non dialysis low sodium 2 gm 1800ml      Chronic deep vein thrombosis (DVT)  On Eliquis      Chronic combined systolic and diastolic heart failure  Patient has Combined Systolic and Diastolic heart failure that is Chronic. On presentation their CHF was well compensated. Most recent BNP and echo results are listed below.  No results for input(s): "BNP" in the last 72 hours.    Latest ECHO  Results for orders placed during the hospital encounter of 02/25/25    Echo    Interpretation Summary    Left Ventricle: The left ventricle is normal in size. Normal wall thickness. There is concentric hypertrophy. Severe global hypokinesis present. There is moderately reduced systolic function with a visually estimated ejection fraction of 30 - 35%. Quantitated ejection fraction is 30%. There is indeterminate diastolic function. No thrombus.    Right Ventricle: The right ventricle is normal in size. Wall thickness is normal. Systolic function is normal.    Left Atrium: The left atrium is mildly dilated.    Mitral Valve: There is mild to moderate regurgitation with a " centrally directed jet.    Tricuspid Valve: There is mild regurgitation with a centrally directed jet.    Pulmonary Artery: The estimated pulmonary artery systolic pressure is 33 mmHg.    IVC/SVC: Intermediate venous pressure at 8 mmHg.    Current Heart Failure Medications  metoprolol succinate (TOPROL-XL) 24 hr split tablet 12.5 mg, Daily, Oral    Plan  - Monitor strict I&Os and daily weights.    - Place on telemetry  - Low sodium diet  - Place on fluid restriction of 1.5 L --> holding in setting of orthostatic hypotension  - Cardiology has not been consulted  - The patient's volume status is at their baseline    Elevated troponin  Pt presenting with hypotension. Trop elevated to 43. EKG negative for ST elevation. Pt denies any chest pain. Likely to be demand ischemia in the setting of hypotension.     - Trop 43 >> 30, stopped trending    Echo  Result Date: 2/26/2025    Left Ventricle: The left ventricle is normal in size. Normal wall   thickness. There is concentric hypertrophy. Severe global hypokinesis   present. There is moderately reduced systolic function with a visually   estimated ejection fraction of 30 - 35%. Quantitated ejection fraction is   30%. There is indeterminate diastolic function. No thrombus.    Right Ventricle: The right ventricle is normal in size. Wall thickness   is normal. Systolic function is normal.    Left Atrium: The left atrium is mildly dilated.    Mitral Valve: There is mild to moderate regurgitation with a centrally   directed jet.    Tricuspid Valve: There is mild regurgitation with a centrally directed   jet.    Pulmonary Artery: The estimated pulmonary artery systolic pressure is   33 mmHg.    IVC/SVC: Intermediate venous pressure at 8 mmHg.          Ileostomy present  Adequate output.      Prostate cancer  S/p TURP  Appears to be on leuprolide injections per MAR    Essential hypertension  Patient's blood pressure range in the last 24 hours was: BP  Min: 94/61  Max: 127/66.The  patient's inpatient anti-hypertensive regimen is listed below:  Current Antihypertensives  metoprolol succinate (TOPROL-XL) 24 hr split tablet 12.5 mg, Daily, Oral    Plan  - BP is controlled, no changes needed to their regimen    T2DM (type 2 diabetes mellitus)  Patient's FSGs are controlled on current medication regimen.  Last A1c reviewed-   Lab Results   Component Value Date    HGBA1C 6.6 (H) 02/25/2025     Most recent fingerstick glucose reviewed-   Recent Labs   Lab 03/01/25  1159 03/01/25  1810 03/01/25 2038 03/02/25  0735   POCTGLUCOSE 130* 135* 182* 112*       Current correctional scale  Low  Maintain anti-hyperglycemic dose as follows-   Antihyperglycemics (From admission, onward)      Start     Stop Route Frequency Ordered    02/25/25 2140  insulin aspart U-100 pen 0-5 Units         -- SubQ Before meals & nightly PRN 02/25/25 2040          Hold Oral hypoglycemics while patient is in the hospital.      VTE Risk Mitigation (From admission, onward)           Ordered     apixaban tablet 5 mg  2 times daily         02/25/25 2040                    Discharge Planning   JOSÉ MIGUEL: 3/3/2025     Code Status: Full Code   Medical Readiness for Discharge Date:   Discharge Plan A: Long-term acute care facility (LTAC)                Jesse Alonzo MD  Department of Hospital Medicine   [facility] - Neurosurgery ([facility])

## 2025-03-02 NOTE — ASSESSMENT & PLAN NOTE
Patient has paroxysmal (<7 days) atrial fibrillation. Patient is currently in sinus rhythm. [id number] Score: 4. The patients heart rate in the last 24 hours is as follows:  Pulse  Min: 56  Max: 90     Antiarrhythmics  metoprolol succinate (TOPROL-XL) 24 hr split tablet 12.5 mg, Daily, Oral  amiodarone tablet 200 mg, Daily, Oral    Anticoagulants  apixaban tablet 5 mg, 2 times daily, Oral    Plan  - Replete lytes with a goal of K>4, Mg >2  - Patient is anticoagulated, see medications listed above.  - Patient's afib is currently controlled  - Will need outpatient follow up with Cardiology to consider another ablation

## 2025-03-02 NOTE — PLAN OF CARE
Recommendations     1. Recommend diet liberlization to regular diet to increase PO intake   2. Recommend boost glucose control TID   3. d/c novasource   4. Encourage good intake   5. RD to monitor weight, labs, intake     Goals:   1. % nutritional needs met with diet   2. Maintain weight during admission  Nutrition Goal Status: progressing towards goal  Communication of RD Recs: other (comment) (POC)     Nutrition Discharge Planning      Nutrition Discharge Planning: Therapeutic diet (comments), Oral supplement regimen (comments)  Therapeutic diet (comments): diabetic renal diet  Oral supplement regimen (comments): boost glucose control

## 2025-03-02 NOTE — ASSESSMENT & PLAN NOTE
Malnutrition Type:  Context: chronic illness  Level: moderate    Related to (etiology):   Decreased appetite/poor intake    Signs and Symptoms (as evidenced by):   Significant wt loss with muscle and fat depletion      Malnutrition Characteristic Summary:  Weight Loss (Malnutrition): greater than 7.5% in 3 months (27 lbs/16.1% wt loss x 3 months)  Energy Intake (Malnutrition): less than 75% for greater than or equal to 3 months  Subcutaneous Fat (Malnutrition): moderate depletion  Muscle Mass (Malnutrition): moderate depletion    Interventions/Recommendations (treatment strategy):  Collaboration with other providers    Nutrition Diagnosis Status:   New

## 2025-03-02 NOTE — ASSESSMENT & PLAN NOTE
The likely etiology of thrombocytopenia is infection. The patients 3 most recent labs are listed below.  Recent Labs     02/28/25  0610 03/01/25  0420   PLT 89* 84*       Plan  - Will transfuse if platelet count is <10k.

## 2025-03-02 NOTE — SUBJECTIVE & OBJECTIVE
Interval History: NAEON. No complaints at this time. Euvolemic status.      Objective:     Vital Signs (Most Recent):  Temp: 98.1 °F (36.7 °C) (03/02/25 0734)  Pulse: 78 (03/02/25 0734)  Resp: 18 (03/02/25 0734)  BP: 120/68 (03/02/25 0734)  SpO2: 98 % (03/02/25 0734) Vital Signs (24h Range):  Temp:  [97.6 °F (36.4 °C)-98.1 °F (36.7 °C)] 98.1 °F (36.7 °C)  Pulse:  [56-90] 78  Resp:  [16-18] 18  SpO2:  [94 %-100 %] 98 %  BP: ()/(53-68) 120/68     Weight: 64.4 kg (141 lb 15.6 oz)  Body mass index is 18.23 kg/m².    Intake/Output Summary (Last 24 hours) at 3/2/2025 0859  Last data filed at 3/2/2025 0800  Gross per 24 hour   Intake 670 ml   Output 1775 ml   Net -1105 ml         Physical Exam  Vitals and nursing note reviewed.   Constitutional:       General: He is not in acute distress.     Appearance: Normal appearance. He is ill-appearing.   HENT:      Head: Normocephalic and atraumatic.      Nose: Nose normal.      Mouth/Throat:      Pharynx: Oropharynx is clear.   Eyes:      Extraocular Movements: Extraocular movements intact.      Conjunctiva/sclera: Conjunctivae normal.   Cardiovascular:      Rate and Rhythm: Normal rate and regular rhythm.      Heart sounds: No murmur heard.  Pulmonary:      Effort: Pulmonary effort is normal. No respiratory distress.      Breath sounds: No wheezing or rales.   Chest:      Chest wall: No tenderness.   Abdominal:      General: Abdomen is flat. Bowel sounds are normal. There is no distension.      Palpations: Abdomen is soft.      Tenderness: There is no abdominal tenderness.      Comments: Ostomy with appropriate stool output   Musculoskeletal:         General: No swelling or tenderness. Normal range of motion.      Cervical back: Normal range of motion.      Right lower leg: No edema.      Left lower leg: No edema.   Lymphadenopathy:      Cervical: No cervical adenopathy.   Skin:     General: Skin is warm and dry.      Coloration: Skin is pale. Skin is not jaundiced.       Findings: Bruising present.   Neurological:      General: No focal deficit present.      Mental Status: He is alert and oriented to person, place, and time.   Psychiatric:         Mood and Affect: Mood normal.         Behavior: Behavior normal.             Significant Labs: All pertinent labs within the past 24 hours have been reviewed.    Significant Imaging: I have reviewed all pertinent imaging results/findings within the past 24 hours.

## 2025-03-02 NOTE — PLAN OF CARE
Problem: Adult Inpatient Plan of Care  Goal: Plan of Care Review  Outcome: Progressing  Goal: Patient-Specific Goal (Individualized)  Outcome: Progressing  Goal: Absence of Hospital-Acquired Illness or Injury  Outcome: Progressing     Problem: Diabetes Comorbidity  Goal: Blood Glucose Level Within Targeted Range  Outcome: Progressing     Problem: Wound  Goal: Optimal Coping  Outcome: Progressing   Pt resting quietly; alert with no distress this shift. See flowsheet for assessment and VS. Fluids encouraged. Pt voiding with urinal and self care ileostomy. Tele orders in place. Blood glucose monitoring with treatment of hypo/hyperglycemia. Remains on droplet isolation precautions. Fall precautions in place and call light in reach.

## 2025-03-02 NOTE — ASSESSMENT & PLAN NOTE
Patient's blood pressure range in the last 24 hours was: BP  Min: 94/61  Max: 127/66.The patient's inpatient anti-hypertensive regimen is listed below:  Current Antihypertensives  metoprolol succinate (TOPROL-XL) 24 hr split tablet 12.5 mg, Daily, Oral    Plan  - BP is controlled, no changes needed to their regimen

## 2025-03-03 PROBLEM — E83.42 HYPOMAGNESEMIA: Status: RESOLVED | Noted: 2025-02-27 | Resolved: 2025-03-03

## 2025-03-03 PROBLEM — R79.89 ELEVATED TROPONIN: Status: RESOLVED | Noted: 2023-09-28 | Resolved: 2025-03-03

## 2025-03-03 PROBLEM — N39.0 UTI (URINARY TRACT INFECTION): Status: RESOLVED | Noted: 2025-02-25 | Resolved: 2025-03-03

## 2025-03-03 LAB
ALBUMIN SERPL BCP-MCNC: 3 G/DL (ref 3.5–5.2)
ALP SERPL-CCNC: 137 U/L (ref 40–150)
ALT SERPL W/O P-5'-P-CCNC: 56 U/L (ref 10–44)
ANION GAP SERPL CALC-SCNC: 9 MMOL/L (ref 8–16)
AST SERPL-CCNC: 43 U/L (ref 10–40)
BASOPHILS # BLD AUTO: 0.01 K/UL (ref 0–0.2)
BASOPHILS NFR BLD: 0.3 % (ref 0–1.9)
BILIRUB SERPL-MCNC: 1 MG/DL (ref 0.1–1)
BUN SERPL-MCNC: 64 MG/DL (ref 8–23)
CALCIUM SERPL-MCNC: 8.6 MG/DL (ref 8.7–10.5)
CANCER AG19-9 SERPL-ACNC: 16.6 U/ML (ref 0–40)
CHLORIDE SERPL-SCNC: 102 MMOL/L (ref 95–110)
CO2 SERPL-SCNC: 24 MMOL/L (ref 23–29)
CREAT SERPL-MCNC: 3.5 MG/DL (ref 0.5–1.4)
DIFFERENTIAL METHOD BLD: ABNORMAL
EOSINOPHIL # BLD AUTO: 0.1 K/UL (ref 0–0.5)
EOSINOPHIL NFR BLD: 2.7 % (ref 0–8)
ERYTHROCYTE [DISTWIDTH] IN BLOOD BY AUTOMATED COUNT: 18.6 % (ref 11.5–14.5)
EST. GFR  (NO RACE VARIABLE): 17.5 ML/MIN/1.73 M^2
GLUCOSE SERPL-MCNC: 106 MG/DL (ref 70–110)
HCT VFR BLD AUTO: 33 % (ref 40–54)
HGB BLD-MCNC: 10.3 G/DL (ref 14–18)
IMM GRANULOCYTES # BLD AUTO: 0.01 K/UL (ref 0–0.04)
IMM GRANULOCYTES NFR BLD AUTO: 0.3 % (ref 0–0.5)
LYMPHOCYTES # BLD AUTO: 1.1 K/UL (ref 1–4.8)
LYMPHOCYTES NFR BLD: 33.9 % (ref 18–48)
MAGNESIUM SERPL-MCNC: 1.5 MG/DL (ref 1.6–2.6)
MCH RBC QN AUTO: 27.5 PG (ref 27–31)
MCHC RBC AUTO-ENTMCNC: 31.2 G/DL (ref 32–36)
MCV RBC AUTO: 88 FL (ref 82–98)
MONOCYTES # BLD AUTO: 0.3 K/UL (ref 0.3–1)
MONOCYTES NFR BLD: 7.8 % (ref 4–15)
NEUTROPHILS # BLD AUTO: 1.8 K/UL (ref 1.8–7.7)
NEUTROPHILS NFR BLD: 55 % (ref 38–73)
NRBC BLD-RTO: 0 /100 WBC
PHOSPHATE SERPL-MCNC: 3.2 MG/DL (ref 2.7–4.5)
PLATELET # BLD AUTO: 78 K/UL (ref 150–450)
PMV BLD AUTO: ABNORMAL FL (ref 9.2–12.9)
POCT GLUCOSE: 109 MG/DL (ref 70–110)
POCT GLUCOSE: 146 MG/DL (ref 70–110)
POTASSIUM SERPL-SCNC: 4.4 MMOL/L (ref 3.5–5.1)
PROT SERPL-MCNC: 5.7 G/DL (ref 6–8.4)
RBC # BLD AUTO: 3.74 M/UL (ref 4.6–6.2)
SODIUM SERPL-SCNC: 135 MMOL/L (ref 136–145)
WBC # BLD AUTO: 3.33 K/UL (ref 3.9–12.7)

## 2025-03-03 PROCEDURE — 25000003 PHARM REV CODE 250: Mod: HCNC

## 2025-03-03 PROCEDURE — 36415 COLL VENOUS BLD VENIPUNCTURE: CPT | Mod: HCNC | Performed by: HOSPITALIST

## 2025-03-03 PROCEDURE — 25000003 PHARM REV CODE 250: Mod: HCNC | Performed by: HOSPITALIST

## 2025-03-03 PROCEDURE — 11000001 HC ACUTE MED/SURG PRIVATE ROOM: Mod: HCNC

## 2025-03-03 PROCEDURE — 80053 COMPREHEN METABOLIC PANEL: CPT | Mod: HCNC

## 2025-03-03 PROCEDURE — 86301 IMMUNOASSAY TUMOR CA 19-9: CPT | Mod: HCNC | Performed by: HOSPITALIST

## 2025-03-03 PROCEDURE — 85025 COMPLETE CBC W/AUTO DIFF WBC: CPT | Mod: HCNC

## 2025-03-03 PROCEDURE — 27000207 HC ISOLATION: Mod: HCNC

## 2025-03-03 PROCEDURE — 84100 ASSAY OF PHOSPHORUS: CPT | Mod: HCNC

## 2025-03-03 PROCEDURE — 83735 ASSAY OF MAGNESIUM: CPT | Mod: HCNC

## 2025-03-03 PROCEDURE — 63600175 PHARM REV CODE 636 W HCPCS: Mod: HCNC

## 2025-03-03 RX ORDER — ALLOPURINOL 100 MG/1
200 TABLET ORAL DAILY
Status: DISCONTINUED | OUTPATIENT
Start: 2025-03-03 | End: 2025-03-04 | Stop reason: HOSPADM

## 2025-03-03 RX ORDER — GABAPENTIN 100 MG/1
200 CAPSULE ORAL NIGHTLY
Status: DISCONTINUED | OUTPATIENT
Start: 2025-03-03 | End: 2025-03-03

## 2025-03-03 RX ORDER — GABAPENTIN 100 MG/1
200 CAPSULE ORAL NIGHTLY
Status: DISCONTINUED | OUTPATIENT
Start: 2025-03-03 | End: 2025-03-04 | Stop reason: HOSPADM

## 2025-03-03 RX ORDER — BENZONATATE 100 MG/1
100 CAPSULE ORAL 3 TIMES DAILY PRN
Status: DISCONTINUED | OUTPATIENT
Start: 2025-03-03 | End: 2025-03-04 | Stop reason: HOSPADM

## 2025-03-03 RX ORDER — MAGNESIUM SULFATE HEPTAHYDRATE 40 MG/ML
2 INJECTION, SOLUTION INTRAVENOUS ONCE
Status: COMPLETED | OUTPATIENT
Start: 2025-03-03 | End: 2025-03-03

## 2025-03-03 RX ORDER — GUAIFENESIN AND DEXTROMETHORPHAN HYDROBROMIDE 10; 100 MG/5ML; MG/5ML
10 SYRUP ORAL EVERY 4 HOURS PRN
Status: DISCONTINUED | OUTPATIENT
Start: 2025-03-03 | End: 2025-03-03

## 2025-03-03 RX ADMIN — BENZONATATE 100 MG: 100 CAPSULE ORAL at 01:03

## 2025-03-03 RX ADMIN — MIDODRINE HYDROCHLORIDE 10 MG: 5 TABLET ORAL at 11:03

## 2025-03-03 RX ADMIN — SODIUM CHLORIDE, POTASSIUM CHLORIDE, SODIUM LACTATE AND CALCIUM CHLORIDE 1000 ML: 600; 310; 30; 20 INJECTION, SOLUTION INTRAVENOUS at 11:03

## 2025-03-03 RX ADMIN — ACETAMINOPHEN 1000 MG: 500 TABLET ORAL at 01:03

## 2025-03-03 RX ADMIN — Medication 6 MG: at 10:03

## 2025-03-03 RX ADMIN — APIXABAN 5 MG: 5 TABLET, FILM COATED ORAL at 10:03

## 2025-03-03 RX ADMIN — GUAIFENESIN 600 MG: 600 TABLET, EXTENDED RELEASE ORAL at 10:03

## 2025-03-03 RX ADMIN — MAGNESIUM SULFATE HEPTAHYDRATE 2 G: 40 INJECTION, SOLUTION INTRAVENOUS at 11:03

## 2025-03-03 RX ADMIN — ASPIRIN 81 MG: 81 TABLET, COATED ORAL at 10:03

## 2025-03-03 RX ADMIN — SODIUM BICARBONATE 650 MG TABLET 1300 MG: at 10:03

## 2025-03-03 RX ADMIN — ACETAMINOPHEN 1000 MG: 500 TABLET ORAL at 10:03

## 2025-03-03 RX ADMIN — ALLOPURINOL 200 MG: 100 TABLET ORAL at 11:03

## 2025-03-03 RX ADMIN — Medication 200 ML: at 11:03

## 2025-03-03 RX ADMIN — TAMSULOSIN HYDROCHLORIDE 0.4 MG: 0.4 CAPSULE ORAL at 10:03

## 2025-03-03 RX ADMIN — GABAPENTIN 200 MG: 100 CAPSULE ORAL at 03:03

## 2025-03-03 RX ADMIN — BRIMONIDINE TARTRATE 1 DROP: 2 SOLUTION OPHTHALMIC at 10:03

## 2025-03-03 RX ADMIN — MIDODRINE HYDROCHLORIDE 10 MG: 5 TABLET ORAL at 06:03

## 2025-03-03 RX ADMIN — AMIODARONE HYDROCHLORIDE 200 MG: 200 TABLET ORAL at 10:03

## 2025-03-03 RX ADMIN — METOPROLOL SUCCINATE 12.5 MG: 25 TABLET, EXTENDED RELEASE ORAL at 10:03

## 2025-03-03 RX ADMIN — PRAVASTATIN SODIUM 40 MG: 40 TABLET ORAL at 10:03

## 2025-03-03 RX ADMIN — PANTOPRAZOLE SODIUM 40 MG: 40 TABLET, DELAYED RELEASE ORAL at 10:03

## 2025-03-03 RX ADMIN — Medication 200 ML: at 03:03

## 2025-03-03 RX ADMIN — MIDODRINE HYDROCHLORIDE 10 MG: 5 TABLET ORAL at 05:03

## 2025-03-03 NOTE — PROGRESS NOTES
Lukasz Haro - Neurosurgery ([facility])  [facility] Medicine  Progress Note    Patient Name: Jarrett Charlton Jr.  MRN: [id number]  Patient Class: IP- Inpatient   Admission Date: 2/25/2025  Length of Stay: 5 days  Attending Physician: Brandon Mccormack MD  Primary Care Provider: Tripp Mohan MD        Subjective     Principal Problem:Acute renal failure superimposed on stage 4 chronic kidney disease        HPI:  Mr. Charlton is a 74-year-old male with a complex medical history, including CKD stage 4, CAD s/p PCI to mid-LAD in 2017, h/o VT, AT s/p AT ablation in 4/2024, DVT on Eliquis, CHF with EF of 35%, SBO s/p colostomy, HTN, HLD, and DM2. He presented to the ED for evaluation of abnormal labs, which showed worsening renal function. The patient reports a week-long history of hypotension, dizziness, and lightheadedness. He recently visited an urgent care clinic for dysuria and was prescribed Bactrim. He also saw his nephrologist today, where the possibility of HD vs. transplant referral was discussed.    In the ED, the patient was initially hypotensive with BP 78/51, but improved to 107/53 without intervention. Labs were significant for WBC 3.13, Hgb 11.5, Plts 188, CO2 19, BUN 93, Cr 5.3 (baseline 2.4), AST 83, ALT 73, , and Trop 43. CXR showed chronic-appearing interstitial findings with no large focal consolidation. Calcific density along the lateral right upper and mid lung zones suggested pleural calcification or plaques. CT Abd/Pelvis showed no obstructive uropathy and a pancreatic head/uncinate cystic lesion, best evaluated on prior MR imaging. The patient was started on vanc and zosyn for suspected urosepsis and admitted to hospital medicine for further management.    Overview/Hospital Course:  Patient presenting with worsening renal function and hypotension. OP provider recently reduced midodrine dose. Restarted patient on Midodrine 10 TID. Lowered Toprol dose to 12.5 QD. Nephrology following for GRADY on CKD,  likely intrinsic ATN in setting of hypotension. Patient tested positive for Flu A and started on 5 day course of Tamiflu. Encouraging oral hydration with Pedialyte. Supplementing with IVF cautiously given his CHF status.    Interval History:  Patient was symptomatic orthostatic again this morning with systolics in the 70s while standing.    will give 1 L IV bolus in 5 hours.  Patient was started on gabapentin for neuropathy and was restarted on his allopurinol.    We we will also hold his Flomax to prevent further orthostatic hypotension.  Urine output will be continue to monitor.    Review of Systems   Respiratory:  Negative for shortness of breath.    Cardiovascular:  Negative for chest pain and palpitations.   Neurological:  Positive for dizziness and light-headedness.     Objective:     Vital Signs (Most Recent):  Temp: 98 °F (36.7 °C) (03/03/25 1145)  Pulse: 78 (03/03/25 1145)  Resp: 18 (03/03/25 0736)  BP: (!) 89/48 (03/03/25 1145)  SpO2: (!) 94 % (03/03/25 1145) Vital Signs (24h Range):  Temp:  [96.6 °F (35.9 °C)-98.1 °F (36.7 °C)] 98 °F (36.7 °C)  Pulse:  [60-82] 78  Resp:  [18-20] 18  SpO2:  [93 %-99 %] 94 %  BP: ()/(43-68) 89/48     Weight: 64.4 kg (141 lb 15.6 oz)  Body mass index is 18.23 kg/m².    Intake/Output Summary (Last 24 hours) at 3/3/2025 1326  Last data filed at 3/3/2025 1200  Gross per 24 hour   Intake 680 ml   Output 1450 ml   Net -770 ml         Physical Exam  Vitals and nursing note reviewed.   Constitutional:       General: He is not in acute distress.     Appearance: Normal appearance. He is ill-appearing.   HENT:      Head: Normocephalic and atraumatic.      Nose: Nose normal.      Mouth/Throat:      Pharynx: Oropharynx is clear.   Eyes:      Extraocular Movements: Extraocular movements intact.      Conjunctiva/sclera: Conjunctivae normal.   Cardiovascular:      Rate and Rhythm: Normal rate and regular rhythm.      Heart sounds: No murmur heard.  Pulmonary:      Effort: Pulmonary  effort is normal. No respiratory distress.      Breath sounds: No wheezing or rales.   Chest:      Chest wall: No tenderness.   Abdominal:      General: Abdomen is flat. Bowel sounds are normal. There is no distension.      Palpations: Abdomen is soft.      Tenderness: There is no abdominal tenderness.      Comments: Ostomy with appropriate stool output   Musculoskeletal:         General: No swelling or tenderness. Normal range of motion.      Cervical back: Normal range of motion.      Right lower leg: No edema.      Left lower leg: No edema.   Lymphadenopathy:      Cervical: No cervical adenopathy.   Skin:     General: Skin is warm and dry.      Coloration: Skin is pale. Skin is not jaundiced.      Findings: Bruising present.   Neurological:      General: No focal deficit present.      Mental Status: He is alert and oriented to person, place, and time.   Psychiatric:         Mood and Affect: Mood normal.         Behavior: Behavior normal.             Significant Labs: All pertinent labs within the past 24 hours have been reviewed.  CBC:   Recent Labs   Lab 03/02/25  0806 03/03/25  0907   WBC 3.27* 3.33*   HGB 10.9* 10.3*   HCT 34.9* 33.0*   PLT 81* 78*     CMP:   Recent Labs   Lab 03/02/25  0806 03/03/25  0907   * 135*   K 4.5 4.4    102   CO2 22* 24   * 106   BUN 63* 64*   CREATININE 3.8* 3.5*   CALCIUM 8.9 8.6*   PROT 6.1 5.7*   ALBUMIN 3.1* 3.0*   BILITOT 0.9 1.0   ALKPHOS 143 137   AST 55* 43*   ALT 70* 56*   ANIONGAP 12 9       Significant Imaging: I have reviewed all pertinent imaging results/findings within the past 24 hours.    Assessment and Plan     * Acute renal failure superimposed on stage 4 chronic kidney disease  GRADY is likely due to acute tubular necrosis caused by hypotension . Baseline creatinine is  2.4 . Most recent creatinine and eGFR are listed below.  Recent Labs     03/01/25  0420 03/02/25  0806 03/03/25  0907   CREATININE 4.3* 3.8* 3.5*   EGFRNORACEVR 13.6* 15.8* 17.5*  "     Plan  - GRADY is improving  - Avoid nephrotoxins and renally dose meds for GFR listed above  - Monitor urine output, serial BMP, and adjust therapy as needed  - Nephrology consulted, appreciate recs   - Christa 17, UCr 59, Uurea 550, Uosm 350   - FeNa 1.2% (prerenal vs intrinsic)  - FeUrea 55.5% (intrinsic)  - Renal US negative    Hepatic steatosis  - Trending LFTs  - Outpatient follow up with Hepatology.      Influenza A  Flu A positive. Not requiring supplemental oxygen.  - Tamiflu x5 days, renally dosed.  - Mucinex BID      Altered bowel elimination due to intestinal ostomy  See Colostomy present       Hypercoagulable state due to paroxysmal atrial fibrillation  Patient has paroxysmal (<7 days) atrial fibrillation. Patient is currently in sinus rhythm. [id number] Score: 4. The patients heart rate in the last 24 hours is as follows:  Pulse  Min: 56  Max: 90     Antiarrhythmics  metoprolol succinate (TOPROL-XL) 24 hr split tablet 12.5 mg, Daily, Oral  amiodarone tablet 200 mg, Daily, Oral    Anticoagulants  apixaban tablet 5 mg, 2 times daily, Oral    Plan  - Replete lytes with a goal of K>4, Mg >2  - Patient is anticoagulated, see medications listed above.  - Patient's afib is currently controlled  - Will need outpatient follow up with Cardiology to consider another ablation    Body mass index (BMI) less than 19  Nutrition consulted    Sepsis  This patient does have evidence of infective focus  My overall impression is sepsis.  Source: Respiratory  Antibiotics given-   Antibiotics (72h ago, onward)      None          Latest lactate reviewed-  No results for input(s): "LACTATE", "POCLAC" in the last 72 hours.    Organ dysfunction indicated by Acute kidney injury    Fluid challenge Contraindicated- Fluid bolus is contraindicated in this patient due to Congestive Heart Failure     Plan:  - Flu A positive  - Tamiflu x5 days  - Mucinex BID for symptomatic relief    Pancreatic mass  Pancreatic head/uncinate process " "cystic lesion, better evaluated on prior MR imaging.       Thrombocytopenia, unspecified  The likely etiology of thrombocytopenia is infection. The patients 3 most recent labs are listed below.  Recent Labs     03/01/25  0420 03/02/25  0806 03/03/25  0907   PLT 84* 81* 78*     Plan  - Will transfuse if platelet count is <10k.      Moderate protein-calorie malnutrition  Nutrition consulted. Most recent weight and BMI monitored-     Measurements:  Wt Readings from Last 1 Encounters:   02/27/25 64.4 kg (141 lb 15.6 oz)   Body mass index is 18.23 kg/m².    Patient has been screened and assessed by RD.    Malnutrition Type:  Context: chronic illness  Level: moderate    Malnutrition Characteristic Summary:  Weight Loss (Malnutrition): greater than 7.5% in 3 months (27 lbs/16.1% wt loss x 3 months)  Energy Intake (Malnutrition): less than 75% for greater than or equal to 3 months  Subcutaneous Fat (Malnutrition): moderate depletion  Muscle Mass (Malnutrition): moderate depletion    Interventions/Recommendations (treatment strategy):  1. Recommend diet liberlization to regular diet to increase PO intake 2. Recommend boost glucose control TID 3. d/c novasource 4. Encourage good intake 5. RD to monitor weight, labs, intake      Chronic deep vein thrombosis (DVT)  On Eliquis      Chronic combined systolic and diastolic heart failure  Patient has Combined Systolic and Diastolic heart failure that is Chronic. On presentation their CHF was well compensated. Most recent BNP and echo results are listed below.  No results for input(s): "BNP" in the last 72 hours.    Latest ECHO  Results for orders placed during the hospital encounter of 02/25/25    Echo    Interpretation Summary    Left Ventricle: The left ventricle is normal in size. Normal wall thickness. There is concentric hypertrophy. Severe global hypokinesis present. There is moderately reduced systolic function with a visually estimated ejection fraction of 30 - 35%. " Quantitated ejection fraction is 30%. There is indeterminate diastolic function. No thrombus.    Right Ventricle: The right ventricle is normal in size. Wall thickness is normal. Systolic function is normal.    Left Atrium: The left atrium is mildly dilated.    Mitral Valve: There is mild to moderate regurgitation with a centrally directed jet.    Tricuspid Valve: There is mild regurgitation with a centrally directed jet.    Pulmonary Artery: The estimated pulmonary artery systolic pressure is 33 mmHg.    IVC/SVC: Intermediate venous pressure at 8 mmHg.    Current Heart Failure Medications  metoprolol succinate (TOPROL-XL) 24 hr split tablet 12.5 mg, Daily, Oral    Plan  - Monitor strict I&Os and daily weights.    - Place on telemetry  - Low sodium diet  - Place on fluid restriction of 1.5 L --> holding in setting of orthostatic hypotension  - Cardiology has not been consulted  - The patient's volume status is at their baseline    Ileostomy present  Adequate output.      Prostate cancer  S/p TURP  Appears to be on leuprolide injections per MAR    Orthostatic hypotension  Patient presents with hypotension. On exam, the patient was noted to be normal heart rate, with adequate perfusion, and no signs of shock. Pt reported dizziness, lightheadedness. Blood pressure normalized without any intervention and further monitoring was initiated. Differential diagnosis includes sepsis and heart failure exacerbation.     - Midodrine 10 TID  - Removed fluid restriction, encourage oral hydration  - Pedialyte 200cc q4h  - IVF to supplement oral hydration  - Strict intake/output  - Will need outpatient follow up with Cardiology and Neurology to further work up and management hypotension    - 1 L IV fluids in 5 hours today    Essential hypertension  Patient's blood pressure range in the last 24 hours was: BP  Min: 79/43  Max: 117/68.The patient's inpatient anti-hypertensive regimen is listed below:  Current  Antihypertensives  metoprolol succinate (TOPROL-XL) 24 hr split tablet 12.5 mg, Daily, Oral    Plan  - BP is controlled, no changes needed to their regimen    T2DM (type 2 diabetes mellitus)  Patient's FSGs are controlled on current medication regimen.  Last A1c reviewed-   Lab Results   Component Value Date    HGBA1C 6.6 (H) 02/25/2025     Most recent fingerstick glucose reviewed-   Recent Labs   Lab 03/02/25  1541 03/02/25  2134 03/03/25  0735   POCTGLUCOSE 116* 109 109     Current correctional scale  Low  Maintain anti-hyperglycemic dose as follows-   Antihyperglycemics (From admission, onward)      None          Hold Oral hypoglycemics while patient is in the hospital.      VTE Risk Mitigation (From admission, onward)           Ordered     apixaban tablet 5 mg  2 times daily         02/25/25 2040                    Discharge Planning   JOSÉ MIGUEL: 3/6/2025     Code Status: Full Code   Medical Readiness for Discharge Date:   Discharge Plan A: Long-term acute care facility (LTAC)                Please place Justification for DME        Prashanth Rodriguez DO  Department of Hospital Medicine   [facility] - Neurosurgery ([facility])

## 2025-03-03 NOTE — ASSESSMENT & PLAN NOTE
GRADY is likely due to acute tubular necrosis caused by hypotension . Baseline creatinine is  2.4 . Most recent creatinine and eGFR are listed below.  Recent Labs     03/01/25  0420 03/02/25  0806 03/03/25  0907   CREATININE 4.3* 3.8* 3.5*   EGFRNORACEVR 13.6* 15.8* 17.5*      Plan  - GRADY is improving  - Avoid nephrotoxins and renally dose meds for GFR listed above  - Monitor urine output, serial BMP, and adjust therapy as needed  - Nephrology consulted, appreciate recs   - Christa 17, UCr 59, Uurea 550, Uosm 350   - FeNa 1.2% (prerenal vs intrinsic)  - FeUrea 55.5% (intrinsic)  - Renal US negative

## 2025-03-03 NOTE — CLINICAL REVIEW
Nephrology Chart Review      Intake/Output Summary (Last 24 hours) at 3/3/2025 0829  Last data filed at 3/2/2025 2142  Gross per 24 hour   Intake 200 ml   Output 1800 ml   Net -1600 ml       Vitals:    03/03/25 0245 03/03/25 0301 03/03/25 0723 03/03/25 0736   BP:  (!) 91/52  (!) 103/52   BP Location:  Left arm  Right arm   Patient Position:  Sitting  Sitting   Pulse: 74 77 69 73   Resp:  18  18   Temp:  97.9 °F (36.6 °C)  97.8 °F (36.6 °C)   TempSrc:  Oral  Oral   SpO2:  98%  98%   Weight:       Height:           Recent Labs   Lab 02/28/25  0610 03/01/25  0420 03/02/25  0806   * 136 135*   K 3.9 4.2 4.5    103 101   CO2 19* 23 22*   BUN 74* 77* 63*   CREATININE 4.6* 4.3* 3.8*   CALCIUM 8.3* 8.5* 8.9   PHOS 3.9 3.8 3.3     GRADY on CKD4. Etiology is hemodynamic instability, BP was 78/51 on admission. On antibiotics for  BL Cr likely 2.5-3. Cr down to 3.8 today. Stable. Will need long term dialysis in the future.     No other related issues identified. Please call Nephrology as needed; We will continue to follow.

## 2025-03-03 NOTE — ASSESSMENT & PLAN NOTE
Patient's FSGs are controlled on current medication regimen.  Last A1c reviewed-   Lab Results   Component Value Date    HGBA1C 6.6 (H) 02/25/2025     Most recent fingerstick glucose reviewed-   Recent Labs   Lab 03/02/25  1541 03/02/25  2134 03/03/25  0735   POCTGLUCOSE 116* 109 109     Current correctional scale  Low  Maintain anti-hyperglycemic dose as follows-   Antihyperglycemics (From admission, onward)      None          Hold Oral hypoglycemics while patient is in the hospital.

## 2025-03-03 NOTE — ASSESSMENT & PLAN NOTE
Nutrition consulted. Most recent weight and BMI monitored-     Measurements:  Wt Readings from Last 1 Encounters:   02/27/25 64.4 kg (141 lb 15.6 oz)   Body mass index is 18.23 kg/m².    Patient has been screened and assessed by RD.    Malnutrition Type:  Context: chronic illness  Level: moderate    Malnutrition Characteristic Summary:  Weight Loss (Malnutrition): greater than 7.5% in 3 months (27 lbs/16.1% wt loss x 3 months)  Energy Intake (Malnutrition): less than 75% for greater than or equal to 3 months  Subcutaneous Fat (Malnutrition): moderate depletion  Muscle Mass (Malnutrition): moderate depletion    Interventions/Recommendations (treatment strategy):  1. Recommend diet liberlization to regular diet to increase PO intake 2. Recommend boost glucose control TID 3. d/c novasource 4. Encourage good intake 5. RD to monitor weight, labs, intake

## 2025-03-03 NOTE — ASSESSMENT & PLAN NOTE
Patient presents with hypotension. On exam, the patient was noted to be normal heart rate, with adequate perfusion, and no signs of shock. Pt reported dizziness, lightheadedness. Blood pressure normalized without any intervention and further monitoring was initiated. Differential diagnosis includes sepsis and heart failure exacerbation.     - Midodrine 10 TID  - Removed fluid restriction, encourage oral hydration  - Pedialyte 200cc q4h  - IVF to supplement oral hydration  - Strict intake/output  - Will need outpatient follow up with Cardiology and Neurology to further work up and management hypotension    - 1 L IV fluids in 5 hours today

## 2025-03-03 NOTE — PLAN OF CARE
Problem: Adult Inpatient Plan of Care  Goal: Plan of Care Review  Outcome: Progressing  Goal: Patient-Specific Goal (Individualized)  Outcome: Progressing  Goal: Absence of Hospital-Acquired Illness or Injury  Outcome: Progressing     Problem: Diabetes Comorbidity  Goal: Blood Glucose Level Within Targeted Range  Outcome: Progressing     Problem: Acute Kidney Injury/Impairment  Goal: Fluid and Electrolyte Balance  Outcome: Progressing     Problem: Wound  Goal: Optimal Coping  Outcome: Progressing   Pt resting quietly; alert with no distress this shift. See flowsheet for assessment and VS. Fluids encouraged. Pt voiding with urinal and self care ileostomy. Tele orders in place. Blood glucose monitoring with treatment of hypo/hyperglycemia. Remains on droplet isolation precautions. Fall precautions in place and call light in reach.

## 2025-03-03 NOTE — SUBJECTIVE & OBJECTIVE
Interval History:  Patient was symptomatic orthostatic again this morning with systolics in the 70s while standing.    will give 1 L IV bolus in 5 hours.  Patient was started on gabapentin for neuropathy and was restarted on his allopurinol.    We we will also hold his Flomax to prevent further orthostatic hypotension.  Urine output will be continue to monitor.    Review of Systems   Respiratory:  Negative for shortness of breath.    Cardiovascular:  Negative for chest pain and palpitations.   Neurological:  Positive for dizziness and light-headedness.     Objective:     Vital Signs (Most Recent):  Temp: 98 °F (36.7 °C) (03/03/25 1145)  Pulse: 78 (03/03/25 1145)  Resp: 18 (03/03/25 0736)  BP: (!) 89/48 (03/03/25 1145)  SpO2: (!) 94 % (03/03/25 1145) Vital Signs (24h Range):  Temp:  [96.6 °F (35.9 °C)-98.1 °F (36.7 °C)] 98 °F (36.7 °C)  Pulse:  [60-82] 78  Resp:  [18-20] 18  SpO2:  [93 %-99 %] 94 %  BP: ()/(43-68) 89/48     Weight: 64.4 kg (141 lb 15.6 oz)  Body mass index is 18.23 kg/m².    Intake/Output Summary (Last 24 hours) at 3/3/2025 1326  Last data filed at 3/3/2025 1200  Gross per 24 hour   Intake 680 ml   Output 1450 ml   Net -770 ml         Physical Exam  Vitals and nursing note reviewed.   Constitutional:       General: He is not in acute distress.     Appearance: Normal appearance. He is ill-appearing.   HENT:      Head: Normocephalic and atraumatic.      Nose: Nose normal.      Mouth/Throat:      Pharynx: Oropharynx is clear.   Eyes:      Extraocular Movements: Extraocular movements intact.      Conjunctiva/sclera: Conjunctivae normal.   Cardiovascular:      Rate and Rhythm: Normal rate and regular rhythm.      Heart sounds: No murmur heard.  Pulmonary:      Effort: Pulmonary effort is normal. No respiratory distress.      Breath sounds: No wheezing or rales.   Chest:      Chest wall: No tenderness.   Abdominal:      General: Abdomen is flat. Bowel sounds are normal. There is no distension.       Palpations: Abdomen is soft.      Tenderness: There is no abdominal tenderness.      Comments: Ostomy with appropriate stool output   Musculoskeletal:         General: No swelling or tenderness. Normal range of motion.      Cervical back: Normal range of motion.      Right lower leg: No edema.      Left lower leg: No edema.   Lymphadenopathy:      Cervical: No cervical adenopathy.   Skin:     General: Skin is warm and dry.      Coloration: Skin is pale. Skin is not jaundiced.      Findings: Bruising present.   Neurological:      General: No focal deficit present.      Mental Status: He is alert and oriented to person, place, and time.   Psychiatric:         Mood and Affect: Mood normal.         Behavior: Behavior normal.             Significant Labs: All pertinent labs within the past 24 hours have been reviewed.  CBC:   Recent Labs   Lab 03/02/25  0806 03/03/25  0907   WBC 3.27* 3.33*   HGB 10.9* 10.3*   HCT 34.9* 33.0*   PLT 81* 78*     CMP:   Recent Labs   Lab 03/02/25  0806 03/03/25  0907   * 135*   K 4.5 4.4    102   CO2 22* 24   * 106   BUN 63* 64*   CREATININE 3.8* 3.5*   CALCIUM 8.9 8.6*   PROT 6.1 5.7*   ALBUMIN 3.1* 3.0*   BILITOT 0.9 1.0   ALKPHOS 143 137   AST 55* 43*   ALT 70* 56*   ANIONGAP 12 9       Significant Imaging: I have reviewed all pertinent imaging results/findings within the past 24 hours.

## 2025-03-03 NOTE — ASSESSMENT & PLAN NOTE
The likely etiology of thrombocytopenia is infection. The patients 3 most recent labs are listed below.  Recent Labs     03/01/25  0420 03/02/25  0806 03/03/25  0907   PLT 84* 81* 78*     Plan  - Will transfuse if platelet count is <10k.

## 2025-03-03 NOTE — ASSESSMENT & PLAN NOTE
Patient's blood pressure range in the last 24 hours was: BP  Min: 79/43  Max: 117/68.The patient's inpatient anti-hypertensive regimen is listed below:  Current Antihypertensives  metoprolol succinate (TOPROL-XL) 24 hr split tablet 12.5 mg, Daily, Oral    Plan  - BP is controlled, no changes needed to their regimen

## 2025-03-04 VITALS
WEIGHT: 142 LBS | RESPIRATION RATE: 18 BRPM | HEART RATE: 69 BPM | BODY MASS INDEX: 18.22 KG/M2 | TEMPERATURE: 97 F | DIASTOLIC BLOOD PRESSURE: 59 MMHG | HEIGHT: 74 IN | OXYGEN SATURATION: 97 % | SYSTOLIC BLOOD PRESSURE: 93 MMHG

## 2025-03-04 PROBLEM — E83.42 HYPOMAGNESEMIA: Status: ACTIVE | Noted: 2025-03-04

## 2025-03-04 LAB
ALBUMIN SERPL BCP-MCNC: 2.6 G/DL (ref 3.5–5.2)
ALP SERPL-CCNC: 129 U/L (ref 40–150)
ALT SERPL W/O P-5'-P-CCNC: 43 U/L (ref 10–44)
ANION GAP SERPL CALC-SCNC: 9 MMOL/L (ref 8–16)
AST SERPL-CCNC: 34 U/L (ref 10–40)
BASOPHILS # BLD AUTO: 0 K/UL (ref 0–0.2)
BASOPHILS NFR BLD: 0 % (ref 0–1.9)
BILIRUB SERPL-MCNC: 0.8 MG/DL (ref 0.1–1)
BUN SERPL-MCNC: 60 MG/DL (ref 8–23)
CALCIUM SERPL-MCNC: 8.3 MG/DL (ref 8.7–10.5)
CHLORIDE SERPL-SCNC: 104 MMOL/L (ref 95–110)
CO2 SERPL-SCNC: 23 MMOL/L (ref 23–29)
CREAT SERPL-MCNC: 3.1 MG/DL (ref 0.5–1.4)
DIFFERENTIAL METHOD BLD: ABNORMAL
EOSINOPHIL # BLD AUTO: 0.1 K/UL (ref 0–0.5)
EOSINOPHIL NFR BLD: 2.9 % (ref 0–8)
ERYTHROCYTE [DISTWIDTH] IN BLOOD BY AUTOMATED COUNT: 18.6 % (ref 11.5–14.5)
EST. GFR  (NO RACE VARIABLE): 20.2 ML/MIN/1.73 M^2
GLUCOSE SERPL-MCNC: 109 MG/DL (ref 70–110)
HCT VFR BLD AUTO: 30 % (ref 40–54)
HGB BLD-MCNC: 9.3 G/DL (ref 14–18)
IMM GRANULOCYTES # BLD AUTO: 0.02 K/UL (ref 0–0.04)
IMM GRANULOCYTES NFR BLD AUTO: 0.7 % (ref 0–0.5)
LYMPHOCYTES # BLD AUTO: 0.8 K/UL (ref 1–4.8)
LYMPHOCYTES NFR BLD: 24.4 % (ref 18–48)
MAGNESIUM SERPL-MCNC: 2 MG/DL (ref 1.6–2.6)
MCH RBC QN AUTO: 27.7 PG (ref 27–31)
MCHC RBC AUTO-ENTMCNC: 31 G/DL (ref 32–36)
MCV RBC AUTO: 89 FL (ref 82–98)
MONOCYTES # BLD AUTO: 0.3 K/UL (ref 0.3–1)
MONOCYTES NFR BLD: 9.4 % (ref 4–15)
NEUTROPHILS # BLD AUTO: 1.9 K/UL (ref 1.8–7.7)
NEUTROPHILS NFR BLD: 62.6 % (ref 38–73)
NRBC BLD-RTO: 0 /100 WBC
PHOSPHATE SERPL-MCNC: 3.3 MG/DL (ref 2.7–4.5)
PLATELET # BLD AUTO: 76 K/UL (ref 150–450)
PMV BLD AUTO: ABNORMAL FL (ref 9.2–12.9)
POCT GLUCOSE: 114 MG/DL (ref 70–110)
POTASSIUM SERPL-SCNC: 4.4 MMOL/L (ref 3.5–5.1)
PROT SERPL-MCNC: 5.2 G/DL (ref 6–8.4)
RBC # BLD AUTO: 3.36 M/UL (ref 4.6–6.2)
SODIUM SERPL-SCNC: 136 MMOL/L (ref 136–145)
WBC # BLD AUTO: 3.07 K/UL (ref 3.9–12.7)

## 2025-03-04 PROCEDURE — 85025 COMPLETE CBC W/AUTO DIFF WBC: CPT | Mod: HCNC

## 2025-03-04 PROCEDURE — 25000003 PHARM REV CODE 250: Mod: HCNC

## 2025-03-04 PROCEDURE — 97530 THERAPEUTIC ACTIVITIES: CPT | Mod: HCNC,CQ

## 2025-03-04 PROCEDURE — 83735 ASSAY OF MAGNESIUM: CPT | Mod: HCNC

## 2025-03-04 PROCEDURE — 80053 COMPREHEN METABOLIC PANEL: CPT | Mod: HCNC

## 2025-03-04 PROCEDURE — 97116 GAIT TRAINING THERAPY: CPT | Mod: HCNC,CQ

## 2025-03-04 PROCEDURE — 36415 COLL VENOUS BLD VENIPUNCTURE: CPT | Mod: HCNC

## 2025-03-04 PROCEDURE — 84100 ASSAY OF PHOSPHORUS: CPT | Mod: HCNC

## 2025-03-04 RX ORDER — GLIMEPIRIDE 4 MG/1
4 TABLET ORAL
Status: ON HOLD
Start: 2025-03-04

## 2025-03-04 RX ORDER — GABAPENTIN 100 MG/1
200 CAPSULE ORAL NIGHTLY
Qty: 60 CAPSULE | Refills: 11 | Status: ON HOLD | OUTPATIENT
Start: 2025-03-04 | End: 2026-03-04

## 2025-03-04 RX ORDER — ELECTROLYTES/DEXTROSE
200 SOLUTION, ORAL ORAL EVERY 4 HOURS
Start: 2025-03-04 | End: 2025-03-04 | Stop reason: HOSPADM

## 2025-03-04 RX ORDER — MIDODRINE HYDROCHLORIDE 10 MG/1
10 TABLET ORAL
Qty: 90 TABLET | Refills: 11 | Status: ON HOLD | OUTPATIENT
Start: 2025-03-04 | End: 2026-03-04

## 2025-03-04 RX ORDER — TORSEMIDE 20 MG/1
20 TABLET ORAL DAILY PRN
Qty: 30 TABLET | Refills: 0 | Status: SHIPPED | OUTPATIENT
Start: 2025-03-04 | End: 2025-03-12 | Stop reason: SDUPTHER

## 2025-03-04 RX ORDER — BENZONATATE 100 MG/1
100 CAPSULE ORAL 3 TIMES DAILY PRN
Qty: 30 CAPSULE | Refills: 0 | Status: SHIPPED | OUTPATIENT
Start: 2025-03-04 | End: 2025-03-14

## 2025-03-04 RX ORDER — METOPROLOL SUCCINATE 25 MG/1
12.5 TABLET, EXTENDED RELEASE ORAL DAILY
Qty: 45 TABLET | Refills: 3 | Status: ON HOLD | OUTPATIENT
Start: 2025-03-04 | End: 2026-03-04

## 2025-03-04 RX ADMIN — BRIMONIDINE TARTRATE 1 DROP: 2 SOLUTION OPHTHALMIC at 09:03

## 2025-03-04 RX ADMIN — GUAIFENESIN 600 MG: 600 TABLET, EXTENDED RELEASE ORAL at 09:03

## 2025-03-04 RX ADMIN — MIDODRINE HYDROCHLORIDE 10 MG: 5 TABLET ORAL at 01:03

## 2025-03-04 RX ADMIN — Medication 200 ML: at 11:03

## 2025-03-04 RX ADMIN — AMIODARONE HYDROCHLORIDE 200 MG: 200 TABLET ORAL at 09:03

## 2025-03-04 RX ADMIN — MIDODRINE HYDROCHLORIDE 10 MG: 5 TABLET ORAL at 06:03

## 2025-03-04 RX ADMIN — ASPIRIN 81 MG: 81 TABLET, COATED ORAL at 09:03

## 2025-03-04 RX ADMIN — METOPROLOL SUCCINATE 12.5 MG: 25 TABLET, EXTENDED RELEASE ORAL at 09:03

## 2025-03-04 RX ADMIN — SODIUM CHLORIDE 500 ML: 9 INJECTION, SOLUTION INTRAVENOUS at 01:03

## 2025-03-04 RX ADMIN — Medication 200 ML: at 07:03

## 2025-03-04 RX ADMIN — PRAVASTATIN SODIUM 40 MG: 40 TABLET ORAL at 09:03

## 2025-03-04 RX ADMIN — APIXABAN 5 MG: 5 TABLET, FILM COATED ORAL at 09:03

## 2025-03-04 RX ADMIN — ALLOPURINOL 200 MG: 100 TABLET ORAL at 09:03

## 2025-03-04 RX ADMIN — SODIUM BICARBONATE 650 MG TABLET 1300 MG: at 09:03

## 2025-03-04 RX ADMIN — PANTOPRAZOLE SODIUM 40 MG: 40 TABLET, DELAYED RELEASE ORAL at 09:03

## 2025-03-04 NOTE — ASSESSMENT & PLAN NOTE
Patient has Abnormal Magnesium: hypomagnesemia. Will continue to monitor electrolytes closely. Will replace the affected electrolytes and repeat labs to be done after interventions completed. The patient's magnesium results have been reviewed and are listed below.  Recent Labs   Lab 03/04/25  0350   MG 2.0

## 2025-03-04 NOTE — SUBJECTIVE & OBJECTIVE
Interval History: NAEON. Patient sitting up on side of bed without dizziness. Endorses body aches from poor sleep position. Kidney function labs improving. Euvolemic status.      Objective:     Vital Signs (Most Recent):  Temp: 97.1 °F (36.2 °C) (03/04/25 0721)  Pulse: 81 (03/04/25 0721)  Resp: 18 (03/04/25 0721)  BP: 136/69 (03/04/25 0721)  SpO2: 97 % (03/04/25 0721) Vital Signs (24h Range):  Temp:  [97.1 °F (36.2 °C)-98.2 °F (36.8 °C)] 97.1 °F (36.2 °C)  Pulse:  [64-82] 81  Resp:  [18-20] 18  SpO2:  [93 %-100 %] 97 %  BP: ()/(43-74) 136/69     Weight: 64.4 kg (141 lb 15.6 oz)  Body mass index is 18.23 kg/m².    Intake/Output Summary (Last 24 hours) at 3/4/2025 0754  Last data filed at 3/4/2025 0718  Gross per 24 hour   Intake 1316 ml   Output 1275 ml   Net 41 ml         Physical Exam  Vitals and nursing note reviewed.   Constitutional:       General: He is not in acute distress.     Appearance: Normal appearance. He is ill-appearing.   HENT:      Head: Normocephalic and atraumatic.      Nose: Nose normal.      Mouth/Throat:      Pharynx: Oropharynx is clear.   Eyes:      Extraocular Movements: Extraocular movements intact.      Conjunctiva/sclera: Conjunctivae normal.   Cardiovascular:      Rate and Rhythm: Normal rate and regular rhythm.      Heart sounds: No murmur heard.  Pulmonary:      Effort: Pulmonary effort is normal. No respiratory distress.      Breath sounds: No wheezing or rales.   Chest:      Chest wall: No tenderness.   Abdominal:      General: Abdomen is flat. Bowel sounds are normal. There is no distension.      Palpations: Abdomen is soft.      Tenderness: There is no abdominal tenderness.      Comments: Ostomy with appropriate stool output   Musculoskeletal:         General: No swelling or tenderness. Normal range of motion.      Cervical back: Normal range of motion.      Right lower leg: No edema.      Left lower leg: No edema.   Lymphadenopathy:      Cervical: No cervical adenopathy.    Skin:     General: Skin is warm and dry.      Coloration: Skin is pale. Skin is not jaundiced.      Findings: Bruising present.   Neurological:      General: No focal deficit present.      Mental Status: He is alert and oriented to person, place, and time.   Psychiatric:         Mood and Affect: Mood normal.         Behavior: Behavior normal.             Significant Labs: All pertinent labs within the past 24 hours have been reviewed.    Significant Imaging: I have reviewed all pertinent imaging results/findings within the past 24 hours.

## 2025-03-04 NOTE — ASSESSMENT & PLAN NOTE
Patient's blood pressure range in the last 24 hours was: BP  Min: 79/43  Max: 137/68.The patient's inpatient anti-hypertensive regimen is listed below:  Current Antihypertensives  metoprolol succinate (TOPROL-XL) 24 hr split tablet 12.5 mg, Daily, Oral    Plan  - BP is controlled, no changes needed to their regimen

## 2025-03-04 NOTE — ASSESSMENT & PLAN NOTE
Patient has paroxysmal (<7 days) atrial fibrillation. Patient is currently in sinus rhythm. [id number] Score: 4. The patients heart rate in the last 24 hours is as follows:  Pulse  Min: 64  Max: 82     Antiarrhythmics  metoprolol succinate (TOPROL-XL) 24 hr split tablet 12.5 mg, Daily, Oral  amiodarone tablet 200 mg, Daily, Oral    Anticoagulants  apixaban tablet 5 mg, 2 times daily, Oral    Plan  - Replete lytes with a goal of K>4, Mg >2  - Patient is anticoagulated, see medications listed above.  - Patient's afib is currently controlled  - Will need outpatient follow up with Cardiology to consider another ablation

## 2025-03-04 NOTE — ASSESSMENT & PLAN NOTE
The likely etiology of thrombocytopenia is infection. The patients 3 most recent labs are listed below.  Recent Labs     03/02/25  0806 03/03/25  0907 03/04/25  0350   PLT 81* 78* 76*       Plan  - Will transfuse if platelet count is <10k.

## 2025-03-04 NOTE — ASSESSMENT & PLAN NOTE
Patient's FSGs are controlled on current medication regimen.  Last A1c reviewed-   Lab Results   Component Value Date    HGBA1C 6.6 (H) 02/25/2025     Most recent fingerstick glucose reviewed-   Recent Labs   Lab 03/03/25 2042 03/04/25  0721   POCTGLUCOSE 146* 114*       Current correctional scale  Low  Maintain anti-hyperglycemic dose as follows-   Antihyperglycemics (From admission, onward)    None        Hold Oral hypoglycemics while patient is in the hospital.

## 2025-03-04 NOTE — NURSING
Discharge instructions printed and reviewed with patient. PIV and telemetry removed. Medications delivered to bedside. Patient is awaiting his daughter for discharge.

## 2025-03-04 NOTE — DISCHARGE SUMMARY
[facility] - Neurosurgery ([facility])  [facility] Medicine  Discharge Summary      Patient Name: Jarrett Charlton Jr.  MRN: [id number]  [facility]: [id number]  Patient Class: IP- Inpatient  Admission Date: 2/25/2025  Hospital Length of Stay: 6 days  Discharge Date and Time:  03/04/2025 12:05 PM  Attending Physician: Brandon Mccormack MD   Discharging Provider: Jesse Alonzo MD  Primary Care Provider: Tripp Mohan MD  [facility] Medicine Team: [place] [address] Jesse Alonzo MD  Primary Care Team: [facility] 4    HPI:   Mr. Charlton is a 74-year-old male with a complex medical history, including CKD stage 4, CAD s/p PCI to mid-LAD in 2017, h/o VT, AT s/p AT ablation in 4/2024, DVT on Eliquis, CHF with EF of 35%, SBO s/p colostomy, HTN, HLD, and DM2. He presented to the ED for evaluation of abnormal labs, which showed worsening renal function. The patient reports a week-long history of hypotension, dizziness, and lightheadedness. He recently visited an urgent care clinic for dysuria and was prescribed Bactrim. He also saw his nephrologist today, where the possibility of HD vs. transplant referral was discussed.    In the ED, the patient was initially hypotensive with BP 78/51, but improved to 107/53 without intervention. Labs were significant for WBC 3.13, Hgb 11.5, Plts 188, CO2 19, BUN 93, Cr 5.3 (baseline 2.4), AST 83, ALT 73, , and Trop 43. CXR showed chronic-appearing interstitial findings with no large focal consolidation. Calcific density along the lateral right upper and mid lung zones suggested pleural calcification or plaques. CT Abd/Pelvis showed no obstructive uropathy and a pancreatic head/uncinate cystic lesion, best evaluated on prior MR imaging. The patient was started on vanc and zosyn for suspected urosepsis and admitted to hospital medicine for further management.    * No surgery found *      Hospital Course:   Patient presenting with worsening renal function and hypotension. OP provider recently reduced  midodrine dose. Restarted patient on Midodrine 10 TID. Lowered Toprol dose to 12.5 QD. Nephrology following for GRADY on CKD, likely intrinsic ATN in setting of hypotension. Patient tested positive for Flu A and started on 5 day course of Tamiflu. Encouraging oral hydration with Pedialyte. Supplementing with IVF cautiously given his CHF status.     Goals of Care Treatment Preferences:  Code Status: Full Code      SDOH Screening:  The patient was screened for utility difficulties, food insecurity, transport difficulties, housing insecurity, and interpersonal safety and there were no concerns identified this admission.     Consults:   Consults (From admission, onward)          Status Ordering Provider     Inpatient consult to Registered Dietitian/Nutritionist  Once        Provider:  (Not yet assigned)    Completed SAMI KELLER     Inpatient consult to Nephrology  Once        Provider:  (Not yet assigned)    Completed CLAIRE ROSE            * Acute renal failure superimposed on stage 4 chronic kidney disease  GRADY is likely due to acute tubular necrosis caused by hypotension . Baseline creatinine is  2.4 . Most recent creatinine and eGFR are listed below.  Recent Labs     03/02/25  0806 03/03/25  0907 03/04/25  0350   CREATININE 3.8* 3.5* 3.1*   EGFRNORACEVR 15.8* 17.5* 20.2*        Plan  - GRADY is improving  - Avoid nephrotoxins and renally dose meds for GFR listed above  - Monitor urine output, serial BMP, and adjust therapy as needed  - Nephrology consulted, appreciate recs   - Christa 17, UCr 59, Uurea 550, Uosm 350   - FeNa 1.2% (prerenal vs intrinsic)  - FeUrea 55.5% (intrinsic)  - Renal US negative    Orthostatic hypotension  Patient presents with hypotension. On exam, the patient was noted to be normal heart rate, with adequate perfusion, and no signs of shock. Pt reported dizziness, lightheadedness. Blood pressure normalized without any intervention and further monitoring was initiated.  "Differential diagnosis includes sepsis and heart failure exacerbation.     - Midodrine 10 TID  - Removed fluid restriction, encourage oral hydration  - Pedialyte 200cc q4h  - IVF to supplement oral hydration  - Strict intake/output  - Will need outpatient follow up with Cardiology and Neurology to further work up and management hypotension    Hypomagnesemia  Patient has Abnormal Magnesium: hypomagnesemia. Will continue to monitor electrolytes closely. Will replace the affected electrolytes and repeat labs to be done after interventions completed. The patient's magnesium results have been reviewed and are listed below.  Recent Labs   Lab 03/04/25  0350   MG 2.0        Hepatic steatosis  - Trending LFTs  - Outpatient follow up with Hepatology.      Influenza A  Flu A positive. Not requiring supplemental oxygen.  - Tamiflu x5 days, renally dosed.  - Mucinex BID      Hypercoagulable state due to paroxysmal atrial fibrillation  Patient has paroxysmal (<7 days) atrial fibrillation. Patient is currently in sinus rhythm. [id number] Score: 4. The patients heart rate in the last 24 hours is as follows:  Pulse  Min: 64  Max: 82     Antiarrhythmics  metoprolol succinate (TOPROL-XL) 24 hr split tablet 12.5 mg, Daily, Oral  amiodarone tablet 200 mg, Daily, Oral    Anticoagulants  apixaban tablet 5 mg, 2 times daily, Oral    Plan  - Replete lytes with a goal of K>4, Mg >2  - Patient is anticoagulated, see medications listed above.  - Patient's afib is currently controlled  - Will need outpatient follow up with Cardiology to consider another ablation    Body mass index (BMI) less than 19  Nutrition consulted    Sepsis  This patient does have evidence of infective focus  My overall impression is sepsis.  Source: Respiratory  Antibiotics given-   Antibiotics (72h ago, onward)      None          Latest lactate reviewed-  No results for input(s): "LACTATE", "POCLAC" in the last 72 hours.    Organ dysfunction indicated by Acute kidney " "injury    Fluid challenge Contraindicated- Fluid bolus is contraindicated in this patient due to Congestive Heart Failure     Plan:  - Flu A positive  - Tamiflu x5 days  - Mucinex BID for symptomatic relief    Pancreatic mass  Pancreatic head/uncinate process cystic lesion, better evaluated on prior MR imaging.       Thrombocytopenia, unspecified  The likely etiology of thrombocytopenia is infection. The patients 3 most recent labs are listed below.  Recent Labs     03/02/25  0806 03/03/25  0907 03/04/25  0350   PLT 81* 78* 76*       Plan  - Will transfuse if platelet count is <10k.      Moderate protein-calorie malnutrition  Nutrition consulted. Most recent weight and BMI monitored-     Measurements:  Wt Readings from Last 1 Encounters:   02/27/25 64.4 kg (141 lb 15.6 oz)   Body mass index is 18.23 kg/m².    Patient has been screened and assessed by RD.    Malnutrition Type:  Context: chronic illness  Level: moderate    Malnutrition Characteristic Summary:  Weight Loss (Malnutrition): greater than 7.5% in 3 months (27 lbs/16.1% wt loss x 3 months)  Energy Intake (Malnutrition): less than 75% for greater than or equal to 3 months  Subcutaneous Fat (Malnutrition): moderate depletion  Muscle Mass (Malnutrition): moderate depletion    Interventions/Recommendations (treatment strategy):  1. Recommend diet liberlization to regular diet to increase PO intake 2. Recommend boost glucose control TID 3. d/c novasource 4. Encourage good intake 5. RD to monitor weight, labs, intake      Chronic deep vein thrombosis (DVT)  On Eliquis      Chronic combined systolic and diastolic heart failure  Patient has Combined Systolic and Diastolic heart failure that is Chronic. On presentation their CHF was well compensated. Most recent BNP and echo results are listed below.  No results for input(s): "BNP" in the last 72 hours.    Latest ECHO  Results for orders placed during the hospital encounter of 02/25/25    Echo    Interpretation " Summary    Left Ventricle: The left ventricle is normal in size. Normal wall thickness. There is concentric hypertrophy. Severe global hypokinesis present. There is moderately reduced systolic function with a visually estimated ejection fraction of 30 - 35%. Quantitated ejection fraction is 30%. There is indeterminate diastolic function. No thrombus.    Right Ventricle: The right ventricle is normal in size. Wall thickness is normal. Systolic function is normal.    Left Atrium: The left atrium is mildly dilated.    Mitral Valve: There is mild to moderate regurgitation with a centrally directed jet.    Tricuspid Valve: There is mild regurgitation with a centrally directed jet.    Pulmonary Artery: The estimated pulmonary artery systolic pressure is 33 mmHg.    IVC/SVC: Intermediate venous pressure at 8 mmHg.    Current Heart Failure Medications  metoprolol succinate (TOPROL-XL) 24 hr split tablet 12.5 mg, Daily, Oral    Plan  - Monitor strict I&Os and daily weights.    - Place on telemetry  - Low sodium diet  - Place on fluid restriction of 1.5 L --> holding in setting of orthostatic hypotension  - Cardiology has not been consulted  - The patient's volume status is at their baseline    Ileostomy present  Adequate output.      Prostate cancer  S/p TURP  Appears to be on leuprolide injections per MAR    Essential hypertension  Patient's blood pressure range in the last 24 hours was: BP  Min: 79/43  Max: 137/68.The patient's inpatient anti-hypertensive regimen is listed below:  Current Antihypertensives  metoprolol succinate (TOPROL-XL) 24 hr split tablet 12.5 mg, Daily, Oral    Plan  - BP is controlled, no changes needed to their regimen    T2DM (type 2 diabetes mellitus)  Patient's FSGs are controlled on current medication regimen.  Last A1c reviewed-   Lab Results   Component Value Date    HGBA1C 6.6 (H) 02/25/2025     Most recent fingerstick glucose reviewed-   Recent Labs   Lab 03/03/25 2042 03/04/25  0721  "  POCTGLUCOSE 146* 114*       Current correctional scale  Low  Maintain anti-hyperglycemic dose as follows-   Antihyperglycemics (From admission, onward)      None          Hold Oral hypoglycemics while patient is in the hospital.      Final Active Diagnoses:    Diagnosis Date Noted POA    PRINCIPAL PROBLEM:  Acute renal failure superimposed on stage 4 chronic kidney disease [N17.9, N18.4] 02/11/2021 Yes    Orthostatic hypotension [I95.1] 12/06/2017 Yes    Hypomagnesemia [E83.42] 03/04/2025 Yes    Influenza A [J10.1] 02/27/2025 Yes    Hepatic steatosis [K76.0] 02/27/2025 Yes    Body mass index (BMI) less than 19 [Z68.1] 02/26/2025 Not Applicable    Hypercoagulable state due to paroxysmal atrial fibrillation [D68.69, I48.0] 02/26/2025 Yes    Sepsis [A41.9] 02/25/2025 Yes    Pancreatic mass [K86.89] 02/03/2025 Yes    Thrombocytopenia, unspecified [D69.6] 10/16/2024 Yes    Moderate protein-calorie malnutrition [E44.0] 10/03/2024 Yes    Chronic deep vein thrombosis (DVT) [I82.509] 09/24/2024 Yes    Chronic combined systolic and diastolic heart failure [I50.42] 08/09/2024 Yes    Ileostomy present [Z93.2] 03/09/2022 Not Applicable    Prostate cancer [C61] 02/11/2021 Yes    T2DM (type 2 diabetes mellitus) [E11.9]  Yes    Essential hypertension [I10]  Yes      Problems Resolved During this Admission:    Diagnosis Date Noted Date Resolved POA    UTI (urinary tract infection) [N39.0] 02/25/2025 03/03/2025 Yes       Discharged Condition: stable    Disposition: Home or Self Care    Follow Up:   Follow-up Information       [facility]. Schedule an appointment as soon as possible for a visit in 2 week(s).    Specialty: Hepatology  Contact information:  Raya Haro  [facility] [id number] [phone]                         Patient Instructions:      WALKER FOR HOME USE     Order Specific Question Answer Comments   Type of Walker: Adult (5'4"-6'6")    With wheels? Yes    Height: 6' 2" (1.88 m)    Weight: 64.4 " kg (141 lb 15.6 oz)    Length of need (1-99 months): 99    Does patient have medical equipment at home? grab bar owns SPC, electric scooter, w/c, BSC   Please check all that apply: Patient's condition impairs ambulation.    Please check all that apply: Patient is unable to safely ambulate without equipment.      Comprehensive metabolic panel   Standing Status: Future Standing Exp. Date: 06/02/26     Order Specific Question Answer Comments   Send normal result to authorizing provider's In Basket if patient is active on MyChart: Yes      CBC auto differential   Standing Status: Future Standing Exp. Date: 06/02/26     Order Specific Question Answer Comments   Send normal result to authorizing provider's In Basket if patient is active on MyChart: Yes      Ambulatory referral/consult to Hepatology   Standing Status: Future   Referral Priority: Routine Referral Type: Consultation   Referral Reason: Specialty Services Required   Requested Specialty: Hepatology   Number of Visits Requested: 1     Ambulatory referral/consult to Cardiology   Standing Status: Future   Referral Priority: Routine Referral Type: Consultation   Referral Reason: Specialty Services Required   Requested Specialty: Cardiology   Number of Visits Requested: 1     Ambulatory referral/consult to Neurology   Standing Status: Future   Referral Priority: Routine Referral Type: Consultation   Referral Reason: Specialty Services Required   Requested Specialty: Neurology   Number of Visits Requested: 1     Ambulatory referral/consult to Nephrology   Standing Status: Future   Referral Priority: Routine Referral Type: Consultation   Referral Reason: Specialty Services Required   Requested Specialty: Nephrology   Number of Visits Requested: 1       Significant Diagnostic Studies: Labs: CMP   Recent Labs   Lab 03/03/25  0907 03/04/25  0350   * 136   K 4.4 4.4    104   CO2 24 23    109   BUN 64* 60*   CREATININE 3.5* 3.1*   CALCIUM 8.6* 8.3*    PROT 5.7* 5.2*   ALBUMIN 3.0* 2.6*   BILITOT 1.0 0.8   ALKPHOS 137 129   AST 43* 34   ALT 56* 43   ANIONGAP 9 9    and CBC   Recent Labs   Lab 03/03/25  0907 03/04/25  0350   WBC 3.33* 3.07*   HGB 10.3* 9.3*   HCT 33.0* 30.0*   PLT 78* 76*       Pending Diagnostic Studies:       None           Medications:  Reconciled Home Medications:      Medication List        PAUSE taking these medications      hydrOXYzine HCL 25 MG tablet  Wait to take this until your doctor or other care provider tells you to start again.  Commonly known as: ATARAX  Take 1 tablet (25 mg total) by mouth 3 (three) times daily as needed for Itching.  Ask about: Should I take this medication?            START taking these medications      benzonatate 100 MG capsule  Commonly known as: TESSALON  Take 1 capsule (100 mg total) by mouth 3 (three) times daily as needed for Cough.     electrolytes-dextrose oral solution  Commonly known as: Pedialyte  Take 200 mLs by mouth every 4 (four) hours.     gabapentin 100 MG capsule  Commonly known as: NEURONTIN  Take 2 capsules (200 mg total) by mouth every evening.     leuprolide acetate (6 month) 45 mg Sykt injection  Commonly known as: LUPRON  Inject 45 mg into the muscle every 6 (six) months. for 4 doses            CHANGE how you take these medications      metoprolol succinate 25 MG 24 hr tablet  Commonly known as: TOPROL-XL  Take 0.5 tablets (12.5 mg total) by mouth once daily.  What changed: how much to take     midodrine 10 MG tablet  Commonly known as: PROAMATINE  Take 1 tablet (10 mg total) by mouth 3 (three) times daily with meals.  What changed:   medication strength  how much to take  when to take this     tamsulosin 0.4 mg Cap  Commonly known as: FLOMAX  Take 1 capsule (0.4 mg total) by mouth once daily.  What changed: when to take this     torsemide 20 MG Tab  Commonly known as: DEMADEX  Take 1 tablet (20 mg total) by mouth daily as needed (for swelling or shortness of breath).  What changed:    when to take this  reasons to take this            CONTINUE taking these medications      ACCU-CHEK SOFTCLIX LANCETS Misc  Generic drug: lancets  TEST BLOOD SUGAR THREE TIMES DAILY     albuterol 2.5 mg /3 mL (0.083 %) nebulizer solution  Commonly known as: PROVENTIL  Take 3 mLs (2.5 mg total) by nebulization every 6 (six) hours as needed for Wheezing or Shortness of Breath. Rescue     alcohol swabs Padm  Commonly known as: DROPSAFE ALCOHOL PREP PADS  Apply 1 each topically once daily.     allopurinoL 100 MG tablet  Commonly known as: ZYLOPRIM  Take 4 tablets (400 mg total) by mouth once daily.     amiodarone 200 MG Tab  Commonly known as: PACERONE  Take 1 tablet (200 mg total) by mouth once daily.     apixaban 5 mg Tab  Commonly known as: ELIQUIS  Take 1 tablet (5 mg total) by mouth 2 (two) times daily.     aspirin 81 MG EC tablet  Commonly known as: ECOTRIN  Take 81 mg by mouth once daily.     azelastine 137 mcg (0.1 %) nasal spray  Commonly known as: ASTELIN  1 spray (137 mcg total) by Nasal route 2 (two) times daily.     blood sugar diagnostic Strp  Commonly known as: ACCU-CHEK GUIDE TEST STRIPS  1 each by Other route 3 (three) times daily. Test Blood Sugar     blood-glucose meter Misc  Commonly known as: ACCU-CHEK GUIDE GLUCOSE METER  Accucheck BID     brimonidine 0.2% 0.2 % Drop  Commonly known as: ALPHAGAN  Place 1 drop into both eyes 2 (two) times a day.     colchicine 0.6 mg tablet  Commonly known as: COLCRYS  Take 1 tablet (0.6 mg total) by mouth every other day.     EYE VITAMIN AND MINERALS ORAL  Take 1 tablet by mouth every morning.     ferrous sulfate 325 mg (65 mg iron) Tab tablet  Commonly known as: IRON  Take 1 tablet (325 mg total) by mouth every other day.     glimepiride 4 MG tablet  Commonly known as: AMARYL  TAKE 1 TABLET TWICE DAILY BEFORE MEALS     pantoprazole 40 MG tablet  Commonly known as: PROTONIX  TAKE 1 TABLET ONE TIME DAILY     pravastatin 40 MG tablet  Commonly known as:  PRAVACHOL  Take 1 tablet (40 mg total) by mouth once daily.     predniSONE 2.5 MG tablet  Commonly known as: DELTASONE  Take 2.5 mg by mouth every other day.            STOP taking these medications      sodium bicarbonate 650 MG tablet              Indwelling Lines/Drains at time of discharge:   Lines/Drains/Airways       Drain  Duration                  Ileostomy 01/01/84 RUQ [id number] days                    Time spent on the discharge of patient: 60 minutes         Jesse Alonzo MD  Department of Hospital Medicine  [facility] - Neurosurgery ([facility])

## 2025-03-04 NOTE — PLAN OF CARE
"[facility] Neurosurgery ([facility])      HOME HEALTH ORDERS  FACE TO FACE ENCOUNTER    Patient Name: Jarrett Charlton Jr.  YOB: 1950    PCP: Tripp Mohan MD   PC[facility] [address] [facility] [address]  PCP Phone Number: [phone]  PCP Fax: [phone]    Encounter Date: 2/25/25    Admit to Home Health    Diagnoses:  Active Hospital Problems    Diagnosis  POA    *Acute renal failure superimposed on stage 4 chronic kidney disease [N17.9, N18.4]  Yes     Priority: 1 - High     Avoid nephrotoxic drug and NSAIDs      Orthostatic hypotension [I95.1]  Yes     Priority: 2     Hypomagnesemia [E83.42]  Yes    Influenza A [J10.1]  Yes    Hepatic steatosis [K76.0]  Yes     1/2025 MRI "Liver: Nodular contour and trace perihepatic fluid suspicious for cirrhosis. "      Body mass index (BMI) less than 19 [Z68.1]  Not Applicable     Underweight        Hypercoagulable state due to paroxysmal atrial fibrillation [D68.69, I48.0]  Yes     On eliquis for afib      Sepsis [A41.9]  Yes    Pancreatic mass [K86.89]  Yes     1/24/25 MRI ". Pancreatic cystic lesion, similar to appearance on prior CT and suboptimally characterized on noncontrast assessment. Recommend further evaluation with dedicated contrast enhanced MRI with MRCP. "      Thrombocytopenia, unspecified [D69.6]  Yes    Moderate protein-calorie malnutrition [E44.0]  Yes    Chronic deep vein thrombosis (DVT) [I82.509]  Yes    Chronic combined systolic and diastolic heart failure [I50.42]  Yes    Ileostomy present [Z93.2]  Not Applicable    Prostate cancer [C61]  Yes    T2DM (type 2 diabetes mellitus) [E11.9]  Yes    Essential hypertension [I10]  Yes      Resolved Hospital Problems    Diagnosis Date Resolved POA    UTI (urinary tract infection) [N39.0] 03/03/2025 Yes       Follow Up Appointments:  Future Appointments   Date Time Provider Department Center   3/6/2025  3:00 PM Rivas Villeda MD [facility] SPORTS [place]   3/14/2025  8:30 AM INFUSION, " CHAIR 6 [facility] INF [facility]   3/14/2025  9:40 AM Zafar Rojas MD [facility] CARDIO [facility]   3/20/2025 10:00 AM INFUSION, CHAIR 3 [facility]   3/20/2025 11:00 AM Momo Hallman PA-C [facility] AENDGAS [facility]   3/27/2025  1:00 PM INFUSION, CHAIR 5 [facility]   3/27/2025  2:20 PM Catarino Mota MD [facility] UROLOGY [facility]   4/4/2025  8:30 AM INFUSION, CHAIR 2 [facility]   4/7/2025  8:40 AM [facility]   4/7/2025  9:00 AM EKG, APPT [facility] EKG [facility]   4/7/2025  9:30 AM Ivette Grover, NP [facility] ARRHYTH [facility]   4/11/2025 10:00 AM INFUSION, CHAIR 9 [facility]   5/6/2025  9:00 AM Anup Mukherjee MD [facility] NEPHRO [facility]       Allergies:  Review of patient's allergies indicates:   Allergen Reactions    Atorvastatin     Rosuvastatin        Medications: Review discharge medications with patient and family and provide education.    Current Medications[1]     Medication List        PAUSE taking these medications      hydrOXYzine HCL 25 MG tablet  Wait to take this until your doctor or other care provider tells you to start again.  Commonly known as: ATARAX  Take 1 tablet (25 mg total) by mouth 3 (three) times daily as needed for Itching.  Ask about: Should I take this medication?            START taking these medications      benzonatate 100 MG capsule  Commonly known as: TESSALON  Take 1 capsule (100 mg total) by mouth 3 (three) times daily as needed for Cough.     gabapentin 100 MG capsule  Commonly known as: NEURONTIN  Take 2 capsules (200 mg total) by mouth every evening.     leuprolide acetate (6 month) 45 mg Sykt injection  Commonly known as: LUPRON  Inject 45 mg into the muscle every 6 (six) months. for 4 doses            CHANGE how you take these medications      glimepiride 4 MG tablet  Commonly known as: AMARYL  Take 1 tablet (4 mg total) by mouth daily with breakfast.  What changed: when to take this     metoprolol  succinate 25 MG 24 hr tablet  Commonly known as: TOPROL-XL  Take 0.5 tablets (12.5 mg total) by mouth once daily.  What changed: how much to take     midodrine 10 MG tablet  Commonly known as: PROAMATINE  Take 1 tablet (10 mg total) by mouth 3 (three) times daily with meals.  What changed:   medication strength  how much to take  when to take this     tamsulosin 0.4 mg Cap  Commonly known as: FLOMAX  Take 1 capsule (0.4 mg total) by mouth once daily.  What changed: when to take this     torsemide 20 MG Tab  Commonly known as: DEMADEX  Take 1 tablet (20 mg total) by mouth daily as needed (for swelling or shortness of breath).  What changed:   when to take this  reasons to take this            CONTINUE taking these medications      ACCU-CHEK SOFTCLIX LANCETS Misc  Generic drug: lancets  TEST BLOOD SUGAR THREE TIMES DAILY     albuterol 2.5 mg /3 mL (0.083 %) nebulizer solution  Commonly known as: PROVENTIL  Take 3 mLs (2.5 mg total) by nebulization every 6 (six) hours as needed for Wheezing or Shortness of Breath. Rescue     alcohol swabs Padm  Commonly known as: DROPSAFE ALCOHOL PREP PADS  Apply 1 each topically once daily.     allopurinoL 100 MG tablet  Commonly known as: ZYLOPRIM  Take 4 tablets (400 mg total) by mouth once daily.     amiodarone 200 MG Tab  Commonly known as: PACERONE  Take 1 tablet (200 mg total) by mouth once daily.     apixaban 5 mg Tab  Commonly known as: ELIQUIS  Take 1 tablet (5 mg total) by mouth 2 (two) times daily.     aspirin 81 MG EC tablet  Commonly known as: ECOTRIN  Take 81 mg by mouth once daily.     azelastine 137 mcg (0.1 %) nasal spray  Commonly known as: ASTELIN  1 spray (137 mcg total) by Nasal route 2 (two) times daily.     blood sugar diagnostic Strp  Commonly known as: ACCU-CHEK GUIDE TEST STRIPS  1 each by Other route 3 (three) times daily. Test Blood Sugar     blood-glucose meter Misc  Commonly known as: ACCU-CHEK GUIDE GLUCOSE METER  Accucheck BID     brimonidine 0.2% 0.2 %  Drop  Commonly known as: ALPHAGAN  Place 1 drop into both eyes 2 (two) times a day.     colchicine 0.6 mg tablet  Commonly known as: COLCRYS  Take 1 tablet (0.6 mg total) by mouth every other day.     EYE VITAMIN AND MINERALS ORAL  Take 1 tablet by mouth every morning.     ferrous sulfate 325 mg (65 mg iron) Tab tablet  Commonly known as: IRON  Take 1 tablet (325 mg total) by mouth every other day.     pantoprazole 40 MG tablet  Commonly known as: PROTONIX  TAKE 1 TABLET ONE TIME DAILY     pravastatin 40 MG tablet  Commonly known as: PRAVACHOL  Take 1 tablet (40 mg total) by mouth once daily.     predniSONE 2.5 MG tablet  Commonly known as: DELTASONE  Take 2.5 mg by mouth every other day.            STOP taking these medications      sodium bicarbonate 650 MG tablet                I have seen and examined this patient within the last 30 days. My clinical findings that support the need for the home health skilled services and home bound status are the following:no   Weakness/numbness causing balance and gait disturbance due to Heart Failure, Weakness/Debility, and Dehydration making it taxing to leave home.     Diet:   cardiac diet    Labs:  SN to perform labs:  CBC: Weekly; 1 week(s) and CMP: Weekly; 1 week(s) and Report Lab results to PCP.    Referrals/ Consults  Physical Therapy to evaluate and treat. Evaluate for home safety and equipment needs; Establish/upgrade home exercise program. Perform / instruct on therapeutic exercises, gait training, transfer training, and Range of Motion.  Occupational Therapy to evaluate and treat. Evaluate home environment for safety and equipment needs. Perform/Instruct on transfers, ADL training, ROM, and therapeutic exercises.  Aide to provide assistance with personal care, ADLs, and vital signs.    Activities:   activity as tolerated    Nursing:   Agency to admit patient within 24 hours of hospital discharge unless specified on physician order or at patient request    SN to  complete comprehensive assessment including routine vital signs. Instruct on disease process and s/s of complications to report to MD. Review/verify medication list sent home with the patient at time of discharge  and instruct patient/caregiver as needed. Frequency may be adjusted depending on start of care date.     Skilled nurse to perform up to 3 visits PRN for symptoms related to diagnosis    Notify MD if SBP > 160 or < 90; DBP > 90 or < 50; HR > 120 or < 50; Temp > 101; O2 < 88%; Other:   Syncope    Ok to schedule additional visits based on staff availability and patient request on consecutive days within the home health episode.    When multiple disciplines ordered:    Start of Care occurs on Sunday - Wednesday schedule remaining discipline evaluations as ordered on separate consecutive days following the start of care.    Thursday SOC -schedule subsequent evaluations Friday and Monday the following week.     Friday - Saturday SOC - schedule subsequent discipline evaluations on consecutive days starting Monday of the following week.    For all post-discharge communication and subsequent orders please contact patient's primary care physician. If unable to reach primary care physician or do not receive response within 30 minutes, please contact Hospital Medicine Team 4 for clinical staff order clarification    Miscellaneous   Heart Failure:      SN to instruct on the following:    Instruct on the definition of CHF.   Instruct on the signs/sympoms of CHF to be reported.   Instruct on and monitor daily weights.   Instruct on factors that cause exacerbation.   Instruct on action, dose, schedule, and side effects of medications.   Instruct on diet as prescribed.   Instruct on activity allowed.   Instruct on life-style modifications for life long management of CHF   SN to assess compliance with daily weights, diet, medications, fluid retention,    safety precautions, activities permitted and life-style  modifications.   Additional 1-2 SN visits per week as needed for signs and symptoms     of CHF exacerbation.      For Weight Gain > 2-3 lbs in 1 day or 4-6 lbs over 1 week notify PCP:  CHF DIURETIC SLIDING SCALE     Skilled nurse visits daily to instruct and monitor medication adherence until target weight. Then resume prior order frequency.     If weight gain exceeds 5 lbs over target weight, call MD  If weight gain of 3-4 lbs over target weight, then:     Take Torsemide as needed to max frequency of daily until target weight achieved or maximum 3 days.     If already on max oral daily dose, see IV diuretic instructions.    After 3 days of increased oral diuretic dose, get BMP. If patient is on increased oral diuretic dose greater than 5 days, repeat BMP at day 7 of increased dose.     Potassium supplementation   Scr > 1.5 mg/dl  Scr  1.5 mg/dl    K < 3.0 - NOTIFY MD and 40 mEq bid  40 mEq tid   K- 3.1-3.3  20 mEq bid  20 mEq tid    K 3.4-3.7  10 mEq bid  10 mEq tid      If target weight not reached after 5 days of increased oral diuretic, proceed to IV diuretic with daily patient contact to include face-to-face visit and telephone encounters.       Home Health Aide:  Nursing Weekly, Physical Therapy Three times weekly, and Occupational Therapy Three times weekly    Wound Care Orders  no    I certify that this patient is confined to his home and needs intermittent skilled nursing care, physical therapy, and speech therapy.               [1]   Current Facility-Administered Medications   Medication Dose Route Frequency Provider Last Rate Last Admin    acetaminophen tablet 1,000 mg  1,000 mg Oral Q8H PRN Jesse Alonzo MD   1,000 mg at 03/03/25 2249    allopurinoL tablet 200 mg  200 mg Oral Daily Prashanth Rodriguez DO   200 mg at 03/03/25 1106    amiodarone tablet 200 mg  200 mg Oral Daily Prashanth Rodriguez DO   200 mg at 03/03/25 1026    apixaban tablet 5 mg  5 mg Oral BID Moses Greer MD   5 mg at 03/03/25  2251    aspirin EC tablet 81 mg  81 mg Oral Daily Jesse Alonzo MD   81 mg at 03/03/25 1026    benzonatate capsule 100 mg  100 mg Oral TID PRN Patricia Shaw DO   100 mg at 03/03/25 0135    brimonidine 0.2% ophthalmic solution 1 drop  1 drop Both Eyes BID Jaswinder Vazquez MD   1 drop at 03/03/25 2253    dextrose 50% injection 12.5 g  12.5 g Intravenous PRN Moses Greer MD        dextrose 50% injection 25 g  25 g Intravenous PRN Moses Greer MD        electrolytes-dextrose (Pedialyte) oral solution 200 mL  200 mL Oral Q4H Jesse Alonzo MD   200 mL at 03/04/25 0745    gabapentin capsule 200 mg  200 mg Oral QHS Prashanth Rodriguez DO   200 mg at 03/03/25 1554    glucagon (human recombinant) injection 1 mg  1 mg Intramuscular PRN Moses Greer MD        glucose chewable tablet 16 g  16 g Oral PRN Moses Greer MD        glucose chewable tablet 24 g  24 g Oral PRN Moses Greer MD        guaiFENesin 12 hr tablet 600 mg  600 mg Oral BID Jesse Alonzo MD   600 mg at 03/03/25 2248    melatonin tablet 6 mg  6 mg Oral Nightly Brandon Mccormack MD   6 mg at 03/03/25 2248    metoprolol succinate (TOPROL-XL) 24 hr split tablet 12.5 mg  12.5 mg Oral Daily Prashanth Rodriguez DO   12.5 mg at 03/03/25 1026    midodrine tablet 10 mg  10 mg Oral TID WM Prashanth Rodriguez DO   10 mg at 03/04/25 0646    naloxone 0.4 mg/mL injection 0.02 mg  0.02 mg Intravenous PRN Moses Greer MD        pantoprazole EC tablet 40 mg  40 mg Oral Daily Jesse Alonzo MD   40 mg at 03/03/25 1026    pravastatin tablet 40 mg  40 mg Oral Daily Moses Greer MD   40 mg at 03/03/25 1026    sodium bicarbonate tablet 1,300 mg  1,300 mg Oral BID Jesse Alonzo MD   1,300 mg at 03/03/25 2248    sodium chloride 0.9% flush 10 mL  10 mL Intravenous Q12H PRN Moses Greer MD   10 mL at 02/26/25 2120     Facility-Administered Medications Ordered in  Other Encounters   Medication Dose Route Frequency Provider Last Rate Last Admin    sodium chloride 0.9% in 1,000 mL infusion   Intravenous Continuous Order, Paper

## 2025-03-04 NOTE — PT/OT/SLP PROGRESS
Physical Therapy Treatment    Patient Name:  Jarrett Charlton Jr.   MRN:  [id number]    Recommendations:     Discharge Recommendations: Moderate Intensity Therapy  Discharge Equipment Recommendations: none  Barriers to discharge:  Inaccessible home and Decreased caregiver support, medical instability (mobility limited due to severity of symptomatic orthostatic hypotension)    Assessment:     Jarrett Charlton Jr. is a 74 y.o. male admitted with a medical diagnosis of Acute renal failure superimposed on stage 4 chronic kidney disease.  He presents with the following impairments/functional limitations: weakness, impaired endurance, impaired self care skills, impaired functional mobility, gait instability, impaired balance, decreased lower extremity function, decreased upper extremity function, impaired coordination, impaired cardiopulmonary response to activity.  Pt was agreeable to therapy, but continues to report dizziness with standing activities. Increase time with taking multiple vitals. Pt tolerated short distance ambulating in the room with CGA and RW distance limited by decrease in BP and increase in dizziness with symptoms improving with rest breaks. Patient continues to demonstrate the need for moderate intensity therapy on a daily basis post acute exhibited by decreased independence with functional mobility    Rehab Prognosis: Good; patient would benefit from acute skilled PT services to address these deficits and reach maximum level of function.    Recent Surgery: * No surgery found *      Plan:     During this hospitalization, patient to be seen 4 x/week to address the identified rehab impairments via gait training, therapeutic activities, therapeutic exercises, neuromuscular re-education and progress toward the following goals:    Plan of Care Expires:  03/30/25    Subjective     Chief Complaint: dizziness with prolong standing  Patient/Family Comments/goals: pt reports not sitting up in bedside chair daily.    Pain/Comfort:  Pain Rating 1: 0/10      Objective:     Communicated with nurse prior to session.  Patient found HOB elevated with telemetry (Ileostomy) upon PT entry to room.     General Precautions: Standard, fall, droplet (enhanced respiratory)  Orthopedic Precautions: N/A  Braces: N/A  Respiratory Status: Room air     Functional Mobility:  Additional staff present: N/A  Bed Mobility:   Scooting to EOB: contact guard assistance  Supine to Sit: contact guard assistance; HOB elevated  Transfers:   Sit <> Stand Transfer: contact guard assistance with rolling walker   Bed <> Chair Transfer: minimum assistance with no assistive device using Stand Pivot technique   Gait:  Pt ambulated ~10 ft with contact guard assistance and rolling walker. Bedside chair closely following.   All lines remained intact throughout ambulation trial, gait belt utilized.  Vitals pre gait sitting EOB: 90/55 (MAP 67)  Vitals post gait sitting EOB: 81/42 (MAP 55)  Patient wearing KAREN hose and abdominal binder (per pt request, binder placed above ileostomy bag)  Pt reports mild dizziness post gait trial, but less than previous session.   Gait Deviation(s): decrease sky,decrease step length, downward gaze, and decrease heels strike   Balance:   Static/Dynamic sitting EOB balance: CGA-SBA  Static standing balance: CGA and RW   Vitals: monitoring BP due to history of dizziness with therapy session    BP MAP   HOB elevated 101/49 68   Sitting EOB 83/39 67   Standing  68/50 56   Sitting EOB  99/46 64      After 2 mins  90/55 67   Post gait  81/42 55   Sitting in chair   (LE elevated) 108/50 69    * KAREN hose and abdominal binder (per pt's request to be placed above illeostomy bag) donned    * pt decline feeling dizzy while sitting EOB, only with prolong standing and gait trials    AM-PAC 6 CLICK MOBILITY  Turning over in bed (including adjusting bedclothes, sheets and blankets)?: 4  Sitting down on and standing up from a chair with arms (e.g.,  wheelchair, bedside commode, etc.): 3  Moving from lying on back to sitting on the side of the bed?: 3  Moving to and from a bed to a chair (including a wheelchair)?: 3  Need to walk in hospital room?: 3  Climbing 3-5 steps with a railing?: 2  Basic Mobility Total Score: 18       Treatment & Education:  Trial KAREN hose and abdominal binder to assist with hypotension.   Patient educated on role of therapy, goals of session, and benefits of out of bed mobility.   Instructed on use of call button and importance of calling nursing staff for assistance with mobility   Questions/concerns addressed within PTA scope of practice  Pt verbalized understanding.  Whiteboard Updated      Patient left up in chair with all lines intact, call button in reach, chair alarm on, and nurse notified..    GOALS:   Multidisciplinary Problems       Physical Therapy Goals          Problem: Physical Therapy    Goal Priority Disciplines Outcome Interventions   Physical Therapy Goal     PT, PT/OT Progressing    Description: Goals to be met by: 3/14     Patient will increase functional independence with mobility by performin. Supine to sit with Modified [place]  2. Sit to supine with Modified [place]  3. Sit to stand transfer with Supervision  4. Gait  x 200 feet with Supervision using LRAD or no AD.                          DME Justifications:   Jarrett's mobility limitation cannot be sufficiently resolved by the use of a cane. His functional mobility deficit can be sufficiently resolved with the use of a Rolling Walker. Patient's mobility limitation significantly impairs their ability to participate in one of more activities of daily living.  The use of a RW will significantly improve the patient's ability to participate in MRADLS and the patient will use it on regular basis in the home.    Time Tracking:     PT Received On: 25  PT Start Time: 1146     PT Stop Time: 1214  PT Total Time (min): 28 min     Billable Minutes:  Gait Training 10 and Therapeutic Activity 18    Treatment Type: Treatment  PT/PTA: PTA     Number of PTA visits since last PT visit: 1 03/04/2025

## 2025-03-04 NOTE — PROGRESS NOTES
[facility] - Neurosurgery ([facility])  [facility] Medicine  Progress Note    Patient Name: Jarrett Charlton Jr.  MRN: [id number]  Patient Class: IP- Inpatient   Admission Date: 2/25/2025  Length of Stay: 6 days  Attending Physician: Brandon Mccormack MD  Primary Care Provider: Tripp Mohan MD        Subjective     Principal Problem:Acute renal failure superimposed on stage 4 chronic kidney disease        HPI:  Mr. Charlton is a 74-year-old male with a complex medical history, including CKD stage 4, CAD s/p PCI to mid-LAD in 2017, h/o VT, AT s/p AT ablation in 4/2024, DVT on Eliquis, CHF with EF of 35%, SBO s/p colostomy, HTN, HLD, and DM2. He presented to the ED for evaluation of abnormal labs, which showed worsening renal function. The patient reports a week-long history of hypotension, dizziness, and lightheadedness. He recently visited an urgent care clinic for dysuria and was prescribed Bactrim. He also saw his nephrologist today, where the possibility of HD vs. transplant referral was discussed.    In the ED, the patient was initially hypotensive with BP 78/51, but improved to 107/53 without intervention. Labs were significant for WBC 3.13, Hgb 11.5, Plts 188, CO2 19, BUN 93, Cr 5.3 (baseline 2.4), AST 83, ALT 73, , and Trop 43. CXR showed chronic-appearing interstitial findings with no large focal consolidation. Calcific density along the lateral right upper and mid lung zones suggested pleural calcification or plaques. CT Abd/Pelvis showed no obstructive uropathy and a pancreatic head/uncinate cystic lesion, best evaluated on prior MR imaging. The patient was started on vanc and zosyn for suspected urosepsis and admitted to hospital medicine for further management.    Overview/Hospital Course:  Patient presenting with worsening renal function and hypotension. OP provider recently reduced midodrine dose. Restarted patient on Midodrine 10 TID. Lowered Toprol dose to 12.5 QD. Nephrology following for GRADY on CKD,  likely intrinsic ATN in setting of hypotension. Patient tested positive for Flu A and started on 5 day course of Tamiflu. Encouraging oral hydration with Pedialyte. Supplementing with IVF cautiously given his CHF status.    Interval History: NAEON. Patient sitting up on side of bed without dizziness. Endorses body aches from poor sleep position. Kidney function labs improving. Euvolemic status.      Objective:     Vital Signs (Most Recent):  Temp: 97.1 °F (36.2 °C) (03/04/25 0721)  Pulse: 81 (03/04/25 0721)  Resp: 18 (03/04/25 0721)  BP: 136/69 (03/04/25 0721)  SpO2: 97 % (03/04/25 0721) Vital Signs (24h Range):  Temp:  [97.1 °F (36.2 °C)-98.2 °F (36.8 °C)] 97.1 °F (36.2 °C)  Pulse:  [64-82] 81  Resp:  [18-20] 18  SpO2:  [93 %-100 %] 97 %  BP: ()/(43-74) 136/69     Weight: 64.4 kg (141 lb 15.6 oz)  Body mass index is 18.23 kg/m².    Intake/Output Summary (Last 24 hours) at 3/4/2025 0754  Last data filed at 3/4/2025 0718  Gross per 24 hour   Intake 1316 ml   Output 1275 ml   Net 41 ml         Physical Exam  Vitals and nursing note reviewed.   Constitutional:       General: He is not in acute distress.     Appearance: Normal appearance. He is ill-appearing.   HENT:      Head: Normocephalic and atraumatic.      Nose: Nose normal.      Mouth/Throat:      Pharynx: Oropharynx is clear.   Eyes:      Extraocular Movements: Extraocular movements intact.      Conjunctiva/sclera: Conjunctivae normal.   Cardiovascular:      Rate and Rhythm: Normal rate and regular rhythm.      Heart sounds: No murmur heard.  Pulmonary:      Effort: Pulmonary effort is normal. No respiratory distress.      Breath sounds: No wheezing or rales.   Chest:      Chest wall: No tenderness.   Abdominal:      General: Abdomen is flat. Bowel sounds are normal. There is no distension.      Palpations: Abdomen is soft.      Tenderness: There is no abdominal tenderness.      Comments: Ostomy with appropriate stool output   Musculoskeletal:          General: No swelling or tenderness. Normal range of motion.      Cervical back: Normal range of motion.      Right lower leg: No edema.      Left lower leg: No edema.   Lymphadenopathy:      Cervical: No cervical adenopathy.   Skin:     General: Skin is warm and dry.      Coloration: Skin is pale. Skin is not jaundiced.      Findings: Bruising present.   Neurological:      General: No focal deficit present.      Mental Status: He is alert and oriented to person, place, and time.   Psychiatric:         Mood and Affect: Mood normal.         Behavior: Behavior normal.             Significant Labs: All pertinent labs within the past 24 hours have been reviewed.    Significant Imaging: I have reviewed all pertinent imaging results/findings within the past 24 hours.    Assessment and Plan     * Acute renal failure superimposed on stage 4 chronic kidney disease  GRADY is likely due to acute tubular necrosis caused by hypotension . Baseline creatinine is  2.4 . Most recent creatinine and eGFR are listed below.  Recent Labs     03/02/25  0806 03/03/25  0907 03/04/25  0350   CREATININE 3.8* 3.5* 3.1*   EGFRNORACEVR 15.8* 17.5* 20.2*        Plan  - GRADY is improving  - Avoid nephrotoxins and renally dose meds for GFR listed above  - Monitor urine output, serial BMP, and adjust therapy as needed  - Nephrology consulted, appreciate recs   - Christa 17, UCr 59, Uurea 550, Uosm 350   - FeNa 1.2% (prerenal vs intrinsic)  - FeUrea 55.5% (intrinsic)  - Renal US negative    Orthostatic hypotension  Patient presents with hypotension. On exam, the patient was noted to be normal heart rate, with adequate perfusion, and no signs of shock. Pt reported dizziness, lightheadedness. Blood pressure normalized without any intervention and further monitoring was initiated. Differential diagnosis includes sepsis and heart failure exacerbation.     - Midodrine 10 TID  - Removed fluid restriction, encourage oral hydration  - Pedialyte 200cc q4h  - IVF to  "supplement oral hydration  - Strict intake/output  - Will need outpatient follow up with Cardiology and Neurology to further work up and management hypotension    Hypomagnesemia  Patient has Abnormal Magnesium: hypomagnesemia. Will continue to monitor electrolytes closely. Will replace the affected electrolytes and repeat labs to be done after interventions completed. The patient's magnesium results have been reviewed and are listed below.  Recent Labs   Lab 03/04/25  0350   MG 2.0        Hepatic steatosis  - Trending LFTs  - Outpatient follow up with Hepatology.      Influenza A  Flu A positive. Not requiring supplemental oxygen.  - Tamiflu x5 days, renally dosed.  - Mucinex BID      Altered bowel elimination due to intestinal ostomy  See Colostomy present       Hypercoagulable state due to paroxysmal atrial fibrillation  Patient has paroxysmal (<7 days) atrial fibrillation. Patient is currently in sinus rhythm. [id number] Score: 4. The patients heart rate in the last 24 hours is as follows:  Pulse  Min: 64  Max: 82     Antiarrhythmics  metoprolol succinate (TOPROL-XL) 24 hr split tablet 12.5 mg, Daily, Oral  amiodarone tablet 200 mg, Daily, Oral    Anticoagulants  apixaban tablet 5 mg, 2 times daily, Oral    Plan  - Replete lytes with a goal of K>4, Mg >2  - Patient is anticoagulated, see medications listed above.  - Patient's afib is currently controlled  - Will need outpatient follow up with Cardiology to consider another ablation    Body mass index (BMI) less than 19  Nutrition consulted    Sepsis  This patient does have evidence of infective focus  My overall impression is sepsis.  Source: Respiratory  Antibiotics given-   Antibiotics (72h ago, onward)      None          Latest lactate reviewed-  No results for input(s): "LACTATE", "POCLAC" in the last 72 hours.    Organ dysfunction indicated by Acute kidney injury    Fluid challenge Contraindicated- Fluid bolus is contraindicated in this patient due to " "Congestive Heart Failure     Plan:  - Flu A positive  - Tamiflu x5 days  - Mucinex BID for symptomatic relief    Pancreatic mass  Pancreatic head/uncinate process cystic lesion, better evaluated on prior MR imaging.       Thrombocytopenia, unspecified  The likely etiology of thrombocytopenia is infection. The patients 3 most recent labs are listed below.  Recent Labs     03/02/25  0806 03/03/25  0907 03/04/25  0350   PLT 81* 78* 76*       Plan  - Will transfuse if platelet count is <10k.      Moderate protein-calorie malnutrition  Nutrition consulted. Most recent weight and BMI monitored-     Measurements:  Wt Readings from Last 1 Encounters:   02/27/25 64.4 kg (141 lb 15.6 oz)   Body mass index is 18.23 kg/m².    Patient has been screened and assessed by RD.    Malnutrition Type:  Context: chronic illness  Level: moderate    Malnutrition Characteristic Summary:  Weight Loss (Malnutrition): greater than 7.5% in 3 months (27 lbs/16.1% wt loss x 3 months)  Energy Intake (Malnutrition): less than 75% for greater than or equal to 3 months  Subcutaneous Fat (Malnutrition): moderate depletion  Muscle Mass (Malnutrition): moderate depletion    Interventions/Recommendations (treatment strategy):  1. Recommend diet liberlization to regular diet to increase PO intake 2. Recommend boost glucose control TID 3. d/c novasource 4. Encourage good intake 5. RD to monitor weight, labs, intake      Chronic deep vein thrombosis (DVT)  On Eliquis      Chronic combined systolic and diastolic heart failure  Patient has Combined Systolic and Diastolic heart failure that is Chronic. On presentation their CHF was well compensated. Most recent BNP and echo results are listed below.  No results for input(s): "BNP" in the last 72 hours.    Latest ECHO  Results for orders placed during the hospital encounter of 02/25/25    Echo    Interpretation Summary    Left Ventricle: The left ventricle is normal in size. Normal wall thickness. There is " concentric hypertrophy. Severe global hypokinesis present. There is moderately reduced systolic function with a visually estimated ejection fraction of 30 - 35%. Quantitated ejection fraction is 30%. There is indeterminate diastolic function. No thrombus.    Right Ventricle: The right ventricle is normal in size. Wall thickness is normal. Systolic function is normal.    Left Atrium: The left atrium is mildly dilated.    Mitral Valve: There is mild to moderate regurgitation with a centrally directed jet.    Tricuspid Valve: There is mild regurgitation with a centrally directed jet.    Pulmonary Artery: The estimated pulmonary artery systolic pressure is 33 mmHg.    IVC/SVC: Intermediate venous pressure at 8 mmHg.    Current Heart Failure Medications  metoprolol succinate (TOPROL-XL) 24 hr split tablet 12.5 mg, Daily, Oral    Plan  - Monitor strict I&Os and daily weights.    - Place on telemetry  - Low sodium diet  - Place on fluid restriction of 1.5 L --> holding in setting of orthostatic hypotension  - Cardiology has not been consulted  - The patient's volume status is at their baseline    Ileostomy present  Adequate output.      Prostate cancer  S/p TURP  Appears to be on leuprolide injections per MAR    Essential hypertension  Patient's blood pressure range in the last 24 hours was: BP  Min: 79/43  Max: 137/68.The patient's inpatient anti-hypertensive regimen is listed below:  Current Antihypertensives  metoprolol succinate (TOPROL-XL) 24 hr split tablet 12.5 mg, Daily, Oral    Plan  - BP is controlled, no changes needed to their regimen    T2DM (type 2 diabetes mellitus)  Patient's FSGs are controlled on current medication regimen.  Last A1c reviewed-   Lab Results   Component Value Date    HGBA1C 6.6 (H) 02/25/2025     Most recent fingerstick glucose reviewed-   Recent Labs   Lab 03/03/25 2042 03/04/25  0721   POCTGLUCOSE 146* 114*       Current correctional scale  Low  Maintain anti-hyperglycemic dose as  follows-   Antihyperglycemics (From admission, onward)      None          Hold Oral hypoglycemics while patient is in the hospital.      VTE Risk Mitigation (From admission, onward)           Ordered     apixaban tablet 5 mg  2 times daily         02/25/25 2040                    Discharge Planning   JOSÉ MIGUEL: 3/6/2025     Code Status: Full Code   Medical Readiness for Discharge Date:   Discharge Plan A: Long-term acute care facility (LTAC)              Jesse Alonzo MD  Department of Hospital Medicine   [facility] - Neurosurgery ([facility])

## 2025-03-04 NOTE — DISCHARGE INSTRUCTIONS
Drink water or pedialyte to thirst. Continue a low-sodium diet with goal of less than 2g sodium daily.  If weight gain of 3-4 lbs over target weight, then: Take Torsemide as needed to max frequency of daily until target weight achieved or maximum 3 days.  If weight gain exceeds 5 lbs over target weight, call MD

## 2025-03-04 NOTE — ASSESSMENT & PLAN NOTE
GRADY is likely due to acute tubular necrosis caused by hypotension . Baseline creatinine is  2.4 . Most recent creatinine and eGFR are listed below.  Recent Labs     03/02/25  0806 03/03/25  0907 03/04/25  0350   CREATININE 3.8* 3.5* 3.1*   EGFRNORACEVR 15.8* 17.5* 20.2*        Plan  - GRADY is improving  - Avoid nephrotoxins and renally dose meds for GFR listed above  - Monitor urine output, serial BMP, and adjust therapy as needed  - Nephrology consulted, appreciate recs   - Christa 17, UCr 59, Uurea 550, Uosm 350   - FeNa 1.2% (prerenal vs intrinsic)  - FeUrea 55.5% (intrinsic)  - Renal US negative

## 2025-03-04 NOTE — PLAN OF CARE
Problem: Adult Inpatient Plan of Care  Goal: Plan of Care Review  Outcome: Progressing  Goal: Patient-Specific Goal (Individualized)  Outcome: Progressing  Goal: Absence of Hospital-Acquired Illness or Injury  Outcome: Progressing     Problem: Diabetes Comorbidity  Goal: Blood Glucose Level Within Targeted Range  Outcome: Progressing     Problem: Acute Kidney Injury/Impairment  Goal: Fluid and Electrolyte Balance  Outcome: Progressing     Pt resting quietly; alert with no distress this shift. See flowsheet for assessment and VS; low bp with MD aware. Fluids encouraged. Pt voiding with urinal and self care ileostomy. Tele orders in place. Blood glucose monitoring with treatment of hypo/hyperglycemia. Remains on droplet isolation precautions. Fall precautions in place and call light in reach.

## 2025-03-04 NOTE — PLAN OF CARE
[facility] - Neurosurgery (Hospital)  Discharge Final Note    Primary Care Provider: Tripp Mohan MD    Expected Discharge Date: 3/4/2025    Final Discharge Note (most recent)       Final Note - 03/04/25 1322          Final Note    Assessment Type Final Discharge Note     Anticipated Discharge Disposition Home-Health Care [facility]     What phone number can be called within the next 1-3 days to see how you are doing after discharge? [phone]     Hospital Resources/Appts/Education Provided Provided patient/caregiver with written discharge plan information;Appointments scheduled and added to AVS        Post-Acute Status    Post-Acute Authorization Home Health     Home Health Status Set-up Complete/Auth obtained     Patient choice form signed by patient/caregiver List with quality metrics by geographic area provided     Discharge Delays None known at this time                     Important Message from Medicare             Contact Info       [facility] HEPATOLOG[facility]   Specialty: Hepatology    [address]  [place] [address]   Phone: [phone]       Next Steps: Schedule an appointment as soon as possible for a visit in 2 week(s)    [facility]   Specialty: Home Health Services, Home Therapy Services, Home Living Aide Services    [address]  [place] [address]   Phone: [phone]       Next Steps: Follow up          Patient marked medically ready and is agreeable to discharge. Patient to discharge to home today and has transportation. Patient will have home health services through [facility] . All necessary follow up appointments have been scheduled and added to patient AVS. No other case management needs at this time.     Future Appointments   Date Time Provider Department Center   3/6/2025  3:00 PM Rivas Villeda MD [facility] SPORTS [place]   3/14/2025  8:30 AM INFUSION, CHAIR 6 [facility] AMB INF [facility] Hosp   3/14/2025  9:40 AM Zafar Rojas MD [facility] CARDIO [facility]   3/20/2025 10:00 AM INFUSION,  CHAIR 3 [facility] AMB INF [facility]   3/20/2025 11:00 AM Momo Hallman PA-C [facility] AENDGAS [facility]   3/27/2025  1:00 PM INFUSION, CHAIR 5 [facility] INF [facility]   3/27/2025  2:20 PM Catarino Mota MD [facility] UROLOGY [facility]   4/4/2025  8:30 AM INFUSION, CHAIR 2 [facility] INF [facility]   4/7/2025  8:40 AM Tripp Mohan MD [facility] IM [facility] PCW   4/7/2025  9:00 AM EKG, APPT [facility] EKG [facility]   4/7/2025  9:30 AM Ivette Grover, NICKY [facility] ARRHYTH [facility]   4/11/2025 10:00 AM INFUSION, CHAIR 9 [facility] INF [facility]   5/6/2025  9:00 AM Anup Mukherjee MD [facility] NEPHRO [facility]       Grace Garner, RSW, MSW  Case Management  [facility]

## 2025-03-07 ENCOUNTER — SSC ENCOUNTER (OUTPATIENT)
Dept: ADMINISTRATIVE | Facility: OTHER | Age: 75
End: 2025-03-07
Payer: MEDICARE

## 2025-03-07 ENCOUNTER — OUTPATIENT CASE MANAGEMENT (OUTPATIENT)
Dept: ADMINISTRATIVE | Facility: OTHER | Age: 75
End: 2025-03-07
Payer: MEDICARE

## 2025-03-07 NOTE — PROGRESS NOTES
Outpatient Care Management  Patient Does Not Consent    Patient: Jarrett Charlton Jr.  MRN:  [id number]  Date of Service:  3/7/2025  Completed by:  Vika Kramer RN    Chief Complaint   Patient presents with    OPCM Enrollment Call    Case Closure     Mr. Farias declined OPCM services, ensured he has OPCM RN's contact information for any future questions/concerns, will proceed with case closure.        Patient Summary           Consent Received:  Decline  Decline Reason:  Needs met

## 2025-03-12 ENCOUNTER — TELEPHONE (OUTPATIENT)
Dept: INTERNAL MEDICINE | Facility: CLINIC | Age: 75
End: 2025-03-12
Payer: MEDICARE

## 2025-03-12 NOTE — TELEPHONE ENCOUNTER
----- Message from Patricia sent at 3/12/2025  1:12 PM CDT -----  Contact: Self/ [phone]  Requesting an RX refill or new RX.Is this a refill or new RX: NewRX name and strength :  torsemide (DEMADEX) 20 MG TabIs this a 30 day or 90 day RX: 90Pharmacy name and phone # :  [facility] [address] [facility] [address]Bwavj: [phone] Fax: [phone] The doctors have asked that we provide their patients with the following 2 reminders -- prescription refills can take up to 72 hours, and a friendly reminder that in the future you can use your [facility] account to request refills: Yes

## 2025-03-12 NOTE — TELEPHONE ENCOUNTER
No care due was identified.  [facility] Embedded Care Due Messages. Reference number: [id number].   3/12/2025 1:34:01 PM CDT

## 2025-03-12 NOTE — TELEPHONE ENCOUNTER
----- Message from Patricia sent at 3/12/2025  1:15 PM CDT -----  Requesting an RX refill or new RX.Is this a refill or new RX: NewRX name and strength:  torsemide (DEMADEX) 20 MG TabIs this a 30 day or 90 day RX: 30Pharmacy name and phone # :  [facility] [address] [facility] [place] [address] [facility]"[employer] [facility] [place] [address]Yvkrb: [phone] Fax: [phone]  The doctors have asked that we provide their patients with the following 2 reminders -- prescription refills can take up to 72 hours, and a friendly reminder that in the future you can use your [facility] account to request refills: Yes

## 2025-03-13 ENCOUNTER — PATIENT MESSAGE (OUTPATIENT)
Dept: INTERNAL MEDICINE | Facility: CLINIC | Age: 75
End: 2025-03-13
Payer: MEDICARE

## 2025-03-13 ENCOUNTER — HOSPITAL ENCOUNTER (OUTPATIENT)
Dept: RADIOLOGY | Facility: HOSPITAL | Age: 75
Discharge: HOME | End: 2025-03-13
Attending: STUDENT IN AN ORGANIZED HEALTH CARE EDUCATION/TRAINING PROGRAM
Payer: MEDICARE

## 2025-03-13 ENCOUNTER — OFFICE VISIT (OUTPATIENT)
Dept: SPORTS MEDICINE | Facility: CLINIC | Age: 75
End: 2025-03-13
Payer: MEDICARE

## 2025-03-13 VITALS — BODY MASS INDEX: 19.36 KG/M2 | WEIGHT: 150.81 LBS

## 2025-03-13 DIAGNOSIS — M25.511 RIGHT SHOULDER PAIN, UNSPECIFIED CHRONICITY: ICD-10-CM

## 2025-03-13 DIAGNOSIS — S46.001A ROTATOR CUFF INJURY, RIGHT, INITIAL ENCOUNTER: Primary | ICD-10-CM

## 2025-03-13 PROCEDURE — 73030 X-RAY EXAM OF SHOULDER: CPT | Mod: 26,HCNC,RT, | Performed by: RADIOLOGY

## 2025-03-13 PROCEDURE — 99999 PR PBB SHADOW E&M-EST. PATIENT-LVL IV: CPT | Mod: PBBFAC,HCNC,, | Performed by: STUDENT IN AN ORGANIZED HEALTH CARE EDUCATION/TRAINING PROGRAM

## 2025-03-13 PROCEDURE — 99204 OFFICE O/P NEW MOD 45 MIN: CPT | Mod: HCNC,S$GLB,, | Performed by: STUDENT IN AN ORGANIZED HEALTH CARE EDUCATION/TRAINING PROGRAM

## 2025-03-13 PROCEDURE — 3044F HG A1C LEVEL LT 7.0%: CPT | Mod: HCNC,CPTII,S$GLB, | Performed by: STUDENT IN AN ORGANIZED HEALTH CARE EDUCATION/TRAINING PROGRAM

## 2025-03-13 PROCEDURE — 73030 X-RAY EXAM OF SHOULDER: CPT | Mod: TC,HCNC,RT

## 2025-03-13 PROCEDURE — 1159F MED LIST DOCD IN RCRD: CPT | Mod: HCNC,CPTII,S$GLB, | Performed by: STUDENT IN AN ORGANIZED HEALTH CARE EDUCATION/TRAINING PROGRAM

## 2025-03-13 PROCEDURE — 1160F RVW MEDS BY RX/DR IN RCRD: CPT | Mod: HCNC,CPTII,S$GLB, | Performed by: STUDENT IN AN ORGANIZED HEALTH CARE EDUCATION/TRAINING PROGRAM

## 2025-03-13 PROCEDURE — 1111F DSCHRG MED/CURRENT MED MERGE: CPT | Mod: HCNC,CPTII,S$GLB, | Performed by: STUDENT IN AN ORGANIZED HEALTH CARE EDUCATION/TRAINING PROGRAM

## 2025-03-13 PROCEDURE — 1125F AMNT PAIN NOTED PAIN PRSNT: CPT | Mod: HCNC,CPTII,S$GLB, | Performed by: STUDENT IN AN ORGANIZED HEALTH CARE EDUCATION/TRAINING PROGRAM

## 2025-03-13 NOTE — TELEPHONE ENCOUNTER
----- Message from Melina sent at 3/13/2025  4:09 PM CDT -----  Contact: Pt  [phone]  Prescription refill request.RX name and strength (copy/paste from chart):   torsemide (DEMADEX) 20 MG TabIs this a 30 day or 90 day RX:  90 Local pharmacy or mail order pharmacy:  mail order Pharmacy name and phone # (copy/paste from chart):   [facility] - [facility] [address] [facility] [address]Ozjoz: [phone] Fax: [phone] information:   Pt is out of medication so I am also send a [facility].

## 2025-03-13 NOTE — PROGRESS NOTES
Subjective:          Chief Complaint: Jarrett Charlton Jr. is a 74 y.o. male who had no chief complaint listed for this encounter.    History of Present Illness    CHIEF COMPLAINT:  - Right shoulder pain and limited range of motion    HPI:  Client presents with right shoulder pain following a fall approximately three months ago. He reports the incident occurred in his bedroom while straightening his bed. His foot got caught under the headboard, causing him to fall backwards, hitting his head on a dresser and landing on his right arm. He describes difficulty lifting his right arm, stating he cannot lift it up unless he does it in a specific manner. His right arm lacks strength, and he frequently drops items. He hears a popping sound when moving the arm, particularly when others attempt to examine it. He reports new-onset numbness in a few fingers on his right hand, which he states is not as severe as a finger on the left hand. He is right-handed and is no longer working. He denies any tearing or popping sound at the time of the fall but reports these symptoms have developed since.    Client denies having a pacemaker or defibrillator.    WORK STATUS:  - Client is not currently working.        Past Medical History:   Diagnosis Date    Atrial fibrillation     Anticoagulant long-term use, RVR. s/p AT ablation in 4/2024,    Autonomic dysfunction 11/2023 11/2023 neuro note    Basal cell carcinoma     BPH (benign prostatic hyperplasia)     s/p TURP    CAD (coronary artery disease) 2017    status post PCI to mid LAD in 2017 ([facility] in 2021 showed patent stent, otherwise nonobstructive CAD)    Cancer of prostate     s/p TURP    Carotid stenosis     Chronic kidney disease     Claudication     DDD (degenerative disc disease) 10/21/2013    Diabetes mellitus with renal complications     Disc disease, degenerative, cervical     DVT (deep venous thrombosis)     Encounter for blood transfusion     GERD (gastroesophageal reflux  disease)     Gout, chronic     History of ulcerative colitis 1982    s/p colectomy and ileostomy    HLD (hyperlipidemia)     HTN (hypertension)     Ileostomy in place 1982    RBBB     Squamous cell carcinoma 03/08/2018    Left superior helix near insertion    Squamous cell carcinoma 04/12/2018    Left forearm x 5    Ventricular tachycardia        Medications Ordered Prior to Encounter[1]    Past Surgical History:   Procedure Laterality Date    ABLATION, SVT, ACCESSORY PATHWAY N/A 4/30/2024    Procedure: Ablation, SVT, Accessory Pathway;  Surgeon: Franky Taylor MD;  Location: [facility] EP LAB;  Service: Cardiology;  Laterality: N/A;  VT, RFA, Carto, MAC, GP, 3 Prep *MDT ILR in situ*    cardiac stents      CATARACT EXTRACTION Bilateral     CERVICAL FUSION      CHOLECYSTECTOMY N/A 2/14/2023    Procedure: CHOLECYSTECTOMY;  Surgeon: Mike Joyce MD;  Location: [facility];  Service: General;  Laterality: N/A;  very high probabilty of converison to open    colectomy and ileostomy  1985    ENDOSCOPIC ULTRASOUND OF UPPER GASTROINTESTINAL TRACT N/A 2/10/2023    Procedure: ULTRASOUND, UPPER GI TRACT, ENDOSCOPIC;  Surgeon: Poli Fuller MD;  Location: [facility] ENDO;  Service: Endoscopy;  Laterality: N/A;    ERCP N/A 2/10/2023    Procedure: ERCP (ENDOSCOPIC RETROGRADE CHOLANGIOPANCREATOGRAPHY);  Surgeon: Poli Fuller MD;  Location: [facility];  Service: Endoscopy;  Laterality: N/A;    EXCISION OF PAROTID GLAND Left 12/18/2020    Procedure: EXCISION, PAROTID GLAND;  Surgeon: Michael Pinzon MD;  [address];  Service: ENT;  Laterality: Left;    INSERTION OF IMPLANTABLE LOOP RECORDER  06/07/2021    INSERTION OF IMPLANTABLE LOOP RECORDER Left 6/7/2021    Procedure: INSERTION, IMPLANTABLE LOOP RECORDER;  Surgeon: Romario Dao MD;  Location: [facility] CATH LAB;  Service: Cardiology;  Laterality: Left;    LEFT HEART CATHETERIZATION Right 4/15/2021    Procedure: CATHETERIZATION, HEART, LEFT;  Surgeon: Marcio Jones MD;   Location: [facility] CATH LAB;  Service: Cardiology;  Laterality: Right;    LYSIS OF ADHESIONS N/A 11/9/2020    Procedure: LYSIS, ADHESIONS,  ERAS low;  Surgeon: CED Haley MD;  Location: [facility] OR 2ND FLR;  Service: Colon and Rectal;  Laterality: N/A;    LYSIS OF ADHESIONS N/A 2/14/2023    Procedure: LYSIS, ADHESIONS;  Surgeon: Mike Joyce MD;  Location: [facility];  Service: General;  Laterality: N/A;    POUCHOSCOPY N/A 4/6/2022    Procedure: ENDOSCOPY, POUCH, SMALL INTESTINE, DIAGNOSTIC;  Surgeon: Dieter Juarez MD;  Location: [facility] ENDO (2ND FLR);  Service: Endoscopy;  Laterality: N/A;    REPAIR, HERNIA, PARASTOMAL N/A 11/9/2020    Procedure: REPAIR, HERNIA, PARASTOMAL;  Surgeon: CED Haley MD;  Location: [facility] OR 2ND FLR;  Service: Colon and Rectal;  Laterality: N/A;    TRANSURETHRAL RESECTION OF PROSTATE (TURP) WITHOUT USE OF LASER N/A 1/23/2019    Procedure: TURP, WITHOUT USING LASER BIPOLAR;  Surgeon: Catarino Mota MD;  Location: [facility] OR 1ST FLR;  Service: Urology;  Laterality: N/A;  1.5 HOURS    TREATMENT OF CARDIAC ARRHYTHMIA  4/30/2024    Procedure: Cardioversion or Defibrillation;  Surgeon: Franky Taylro MD;  Location: [facility] EP LAB;  Service: Cardiology;;       Family History   Problem Relation Name Age of Onset    Dementia Mother      Cancer Father      Diabetes Father      Heart disease Father      Melanoma Neg Hx      Hypertension Neg Hx      Arthritis Neg Hx         Social History[2]   Review of Systems   Constitutional: Negative.   HENT: Negative.     Eyes: Negative.    Cardiovascular: Negative.    Respiratory: Negative.     Endocrine: Negative.    Hematologic/Lymphatic: Negative.    Skin: Negative.    Musculoskeletal:  Positive for joint pain and muscle weakness.   Neurological: Negative.    Psychiatric/Behavioral: Negative.     Allergic/Immunologic: Negative.        Pain Related Questions  Over the past 3 days, what was your average pain during activity? (I.e. running,  jogging, walking, climbing stairs, getting dressed, ect.): 10  Over the past 3 days, what was your highest pain level?: 8  Over the past 3 days, what was your lowest pain level? : 8    Other  Was the patient's HEIGHT measured or patient reported?: Patient Reported  Was the patient's WEIGHT measured or patient reported?: Measured      Objective:        General: Jarrett is well-developed, well-nourished, appears stated age, in no acute distress, alert and oriented to time, place and person.     General    Nursing note and vitals reviewed.  Constitutional: He is oriented to person, place, and time. He appears well-developed and well-nourished. No distress.   HENT:   Head: Normocephalic and atraumatic.   Nose: Nose normal.   Eyes: EOM are normal.   Cardiovascular:  Intact distal pulses.            Pulmonary/Chest: Effort normal. No respiratory distress.   Neurological: He is alert and oriented to person, place, and time.   Psychiatric: He has a normal mood and affect. His behavior is normal. Judgment and thought content normal.         Right Shoulder Exam     Inspection/Observation   Swelling: absent  Bruising: absent  Scars: absent  Deformity: absent  Scapular Winging: absent  Scapular Dyskinesia: negative  Atrophy: absent    Tenderness   The patient is tender to palpation of the greater tuberosity and biceps tendon.    Range of Motion   Active abduction:  10   Passive abduction:  110   Forward Flexion:  30   Forward Elevation: 30  External Rotation 0 degrees:  10   Internal rotation 0 degrees:  Sacrum     Tests & Signs   Drop arm: positive  Lag Sign 0 degrees: positive  Lift Off Sign: positive  Belly Press: positive  Bear Hug: positive    Other   Sensation: normal    Left Shoulder Exam   Left shoulder exam is normal.    Inspection/Observation   Swelling: absent  Bruising: absent  Scars: absent  Deformity: absent  Scapular Winging: absent  Scapular Dyskinesia: negative  Atrophy: absent    Tenderness   The patient is  experiencing no tenderness.     Range of Motion   Active abduction:  170   Passive abduction:  170   Forward Flexion:  180   Forward Elevation: 180  External Rotation 0 degrees:  60   Internal rotation 0 degrees:  Mid thoracic     Other   Sensation: normal       Muscle Strength   Right Upper Extremity   Shoulder Abduction: 2/5   Shoulder Internal Rotation: 2/5   Shoulder External Rotation: 2/5   Supraspinatus: 2/5   Subscapularis: 2/5   Biceps: 5/5   Left Upper Extremity  Shoulder Abduction: 5/5   Shoulder Internal Rotation: 5/5   Shoulder External Rotation: 5/5   Supraspinatus: 5/5   Subscapularis: 5/5   Biceps: 5/5     Vascular Exam     Right Pulses      Radial:                    2+      Left Pulses      Radial:                    2+      Capillary Refill  Right Hand: normal capillary refill  Left Hand: normal capillary refill            Imaging:   X-rays of the right shoulder from 03/13/2025 personally viewed by me on that day these include AP, Grashey, scapular Y, axillary lateral.  No fracture.  Mild superior migration of the humeral head with a acromial humeral distance a 5.5 mm.      Assessment:   Jarrett Charlton Jr. is a 74 y.o. male with right shoulder pain concerning for traumatic rotator cuff tear  1. Rotator cuff injury, right, initial encounter  Ambulatory referral/consult to Sports Medicine    MRI Shoulder Without Contrast Right      2. Right shoulder pain, unspecified chronicity  X-ray Shoulder 2 or More Views Right         Plan:       Assessment & Plan    IMAGING:  - Ordered MRI Right Shoulder to evaluate for suspected rotator cuff tear.    PROCEDURES:  - Recommend MRI of the right shoulder to assess potential rotator cuff tear due to patient's inability to lift upper extremity.  - Client understands the risks and benefits and elects to proceed with MRI.    FOLLOW UP:  - Follow up after MRI to review results.         This note was generated with the assistance of ambient listening technology.  Verbal consent was obtained by the patient and accompanying visitor(s) for the recording of patient appointment to facilitate this note. I attest to having reviewed and edited the generated note for accuracy, though some syntax or spelling errors may persist. Please contact the author of this note for any clarification.                         [1]   Current Outpatient Medications on File Prior to Visit   Medication Sig Dispense Refill    ACCU-CHEK SOFTCLIX LANCETS [facility] [place], [place] TEST BLOOD SUGAR THREE TIMES DAILY 300 each 3    albuterol (PROVENTIL) 2.5 mg /3 mL (0.083 %) nebulizer solution Take 3 mLs (2.5 mg total) by nebulization every 6 (six) hours as needed for Wheezing or Shortness of Breath. Rescue (Patient taking differently: Take 2.5 mg by nebulization every 6 (six) hours as needed for Wheezing or Shortness of Breath. Rescue  Patient has not started yet.) 60 each 6    alcohol swabs (DROPSAFE ALCOHOL PREP PADS) PadM Apply 1 each topically once daily. 500 each 3    allopurinoL (ZYLOPRIM) 100 MG tablet Take 4 tablets (400 mg total) by mouth once daily. 360 tablet 4    amiodarone (PACERONE) 200 MG Tab Take 1 tablet (200 mg total) by mouth once daily. 30 tablet 0    apixaban (ELIQUIS) 5 mg Tab Take 1 tablet (5 mg total) by mouth 2 (two) times daily. 180 tablet 3    aspirin (ECOTRIN) 81 MG EC tablet Take 81 mg by mouth once daily.      azelastine (ASTELIN) 137 mcg (0.1 %) nasal spray 1 spray (137 mcg total) by Nasal route 2 (two) times daily. (Patient taking differently: 1 spray by Nasal route 2 (two) times daily. Patient has not started yet.) 30 mL 1    benzonatate (TESSALON) 100 MG capsule Take 1 capsule (100 mg total) by mouth 3 (three) times daily as needed for Cough. 30 capsule 0    blood sugar diagnostic (ACCU-CHEK GUIDE TEST STRIPS) Strp 1 each by Other route 3 (three) times daily. Test Blood Sugar 300 strip 10    blood-glucose meter (ACCU-CHEK GUIDE GLUCOSE METER) [facility] [place] [place] Accucheck BID 1 each 0    brimonidine 0.2%  (ALPHAGAN) 0.2 % Drop Place 1 drop into both eyes 2 (two) times a day. 60 mL 3    colchicine (COLCRYS) 0.6 mg tablet Take 1 tablet (0.6 mg total) by mouth every other day. 45 tablet 3    ferrous sulfate (IRON) 325 mg (65 mg iron) Tab tablet Take 1 tablet (325 mg total) by mouth every other day. 45 tablet 3    gabapentin (NEURONTIN) 100 MG capsule Take 2 capsules (200 mg total) by mouth every evening. 60 capsule 11    glimepiride (AMARYL) 4 MG tablet Take 1 tablet (4 mg total) by mouth daily with breakfast.      leuprolide acetate, 6 month, (LUPRON) 45 mg SyKt injection Inject 45 mg into the muscle every 6 (six) months. for 4 doses      metoprolol succinate (TOPROL-XL) 25 MG 24 hr tablet Take 0.5 tablets (12.5 mg total) by mouth once daily. 45 tablet 3    midodrine (PROAMATINE) 10 MG tablet Take 1 tablet (10 mg total) by mouth 3 (three) times daily with meals. 90 tablet 11    pantoprazole (PROTONIX) 40 MG tablet TAKE 1 TABLET ONE TIME DAILY 90 tablet 3    pravastatin (PRAVACHOL) 40 MG tablet Take 1 tablet (40 mg total) by mouth once daily. 90 tablet 3    predniSONE (DELTASONE) 2.5 MG tablet Take 2.5 mg by mouth every other day.      tamsulosin (FLOMAX) 0.4 mg Cap Take 1 capsule (0.4 mg total) by mouth once daily. (Patient taking differently: Take 0.4 mg by mouth every morning.) 90 capsule 3    torsemide (DEMADEX) 20 MG Tab Take 1 tablet (20 mg total) by mouth daily as needed (for swelling or shortness of breath). 30 tablet 0    vit A/C/E ac/ZnOx/cupric oxide (EYE VITAMIN AND MINERALS ORAL) Take 1 tablet by mouth every morning.       Current Facility-Administered Medications on File Prior to Visit   Medication Dose Route Frequency Provider Last Rate Last Admin    leuprolide acetate (6 month) injection 45 mg  45 mg Intramuscular Q6 Months    45 mg at 02/21/25 0955    sodium chloride 0.9% in 1,000 mL infusion   Intravenous Continuous Order, Paper       [2]   Social History  Socioeconomic History    Marital status:      Number of children: 1   Occupational History    Occupation:  x 44 years     Comment: Retired    Occupation: [place]    Tobacco Use    Smoking status: Never     Passive exposure: Never    Smokeless tobacco: Never   Substance and Sexual Activity    Alcohol use: No    Drug use: No    Sexual activity: Not Currently     Partners: Female     Social Drivers of Health     Financial Resource Strain: Low Risk  (2/27/2025)    Overall Financial Resource Strain (CARDIA)     Difficulty of Paying Living Expenses: Not very hard   Food Insecurity: No Food Insecurity (2/27/2025)    Hunger Vital Sign     Worried About Running Out of Food in the Last Year: Never true     Ran Out of Food in the Last Year: Never true   Transportation Needs: No Transportation Needs (2/25/2025)    PRAPARE - Transportation     Lack of Transportation (Medical): No     Lack of Transportation (Non-Medical): No   Physical Activity: Insufficiently Active (2/25/2025)    Exercise Vital Sign     Days of Exercise per Week: 3 days     Minutes of Exercise per Session: 30 min   Stress: Stress Concern Present (2/27/2025)    [place] [employer] - Occupational Stress Questionnaire     Feeling of Stress : To some extent   Housing Stability: Low Risk  (2/27/2025)    Housing Stability Vital Sign     Unable to Pay for Housing in the Last Year: No     Homeless in the Last Year: No

## 2025-03-13 NOTE — TELEPHONE ENCOUNTER
Refill Routing Note   Medication(s) are not appropriate for processing by [facility] for the following reason(s):        New or recently adjusted medication  No active prescription written by provider  Pended with bridge supply to be sent to local pharmacy for patient    [facility] action(s):  Defer               Appointments  past 12m or future 3m with PCP    Date Provider   Last Visit   2/25/2025 Tripp Mohan MD   Next Visit   4/7/2025 Tripp Mohan MD   ED visits in past 90 days: 2        Note composed:4:20 PM 03/13/2025

## 2025-03-14 ENCOUNTER — OFFICE VISIT (OUTPATIENT)
Dept: CARDIOLOGY | Facility: CLINIC | Age: 75
End: 2025-03-14
Payer: MEDICARE

## 2025-03-14 VITALS
HEIGHT: 74 IN | DIASTOLIC BLOOD PRESSURE: 78 MMHG | OXYGEN SATURATION: 100 % | WEIGHT: 154.31 LBS | HEART RATE: 64 BPM | RESPIRATION RATE: 16 BRPM | SYSTOLIC BLOOD PRESSURE: 136 MMHG | BODY MASS INDEX: 19.8 KG/M2

## 2025-03-14 DIAGNOSIS — I50.42 CHRONIC COMBINED SYSTOLIC AND DIASTOLIC HEART FAILURE: Primary | ICD-10-CM

## 2025-03-14 DIAGNOSIS — I71.22 ANEURYSM OF AORTIC ARCH WITHOUT RUPTURE: ICD-10-CM

## 2025-03-14 DIAGNOSIS — I47.29 PAROXYSMAL VENTRICULAR TACHYCARDIA: ICD-10-CM

## 2025-03-14 DIAGNOSIS — I48.0 PAF (PAROXYSMAL ATRIAL FIBRILLATION): ICD-10-CM

## 2025-03-14 DIAGNOSIS — I10 ESSENTIAL HYPERTENSION: ICD-10-CM

## 2025-03-14 DIAGNOSIS — I25.10 CORONARY ARTERY DISEASE INVOLVING NATIVE CORONARY ARTERY OF NATIVE HEART WITHOUT ANGINA PECTORIS: ICD-10-CM

## 2025-03-14 DIAGNOSIS — E78.5 HYPERLIPIDEMIA, UNSPECIFIED HYPERLIPIDEMIA TYPE: ICD-10-CM

## 2025-03-14 DIAGNOSIS — I45.2 BIFASCICULAR BLOCK: ICD-10-CM

## 2025-03-14 DIAGNOSIS — I95.9 HYPOTENSION: ICD-10-CM

## 2025-03-14 DIAGNOSIS — I70.0 AORTIC ATHEROSCLEROSIS: ICD-10-CM

## 2025-03-14 DIAGNOSIS — I27.20 PULMONARY HYPERTENSION: ICD-10-CM

## 2025-03-14 PROCEDURE — 99999 PR PBB SHADOW E&M-EST. PATIENT-LVL V: CPT | Mod: PBBFAC,HCNC,, | Performed by: INTERNAL MEDICINE

## 2025-03-14 PROCEDURE — 3078F DIAST BP <80 MM HG: CPT | Mod: HCNC,CPTII,S$GLB, | Performed by: INTERNAL MEDICINE

## 2025-03-14 PROCEDURE — 1111F DSCHRG MED/CURRENT MED MERGE: CPT | Mod: HCNC,CPTII,S$GLB, | Performed by: INTERNAL MEDICINE

## 2025-03-14 PROCEDURE — 3044F HG A1C LEVEL LT 7.0%: CPT | Mod: HCNC,CPTII,S$GLB, | Performed by: INTERNAL MEDICINE

## 2025-03-14 PROCEDURE — 99214 OFFICE O/P EST MOD 30 MIN: CPT | Mod: HCNC,S$GLB,, | Performed by: INTERNAL MEDICINE

## 2025-03-14 PROCEDURE — 1126F AMNT PAIN NOTED NONE PRSNT: CPT | Mod: HCNC,CPTII,S$GLB, | Performed by: INTERNAL MEDICINE

## 2025-03-14 PROCEDURE — 3075F SYST BP GE 130 - 139MM HG: CPT | Mod: HCNC,CPTII,S$GLB, | Performed by: INTERNAL MEDICINE

## 2025-03-14 PROCEDURE — 1160F RVW MEDS BY RX/DR IN RCRD: CPT | Mod: HCNC,CPTII,S$GLB, | Performed by: INTERNAL MEDICINE

## 2025-03-14 PROCEDURE — 1159F MED LIST DOCD IN RCRD: CPT | Mod: HCNC,CPTII,S$GLB, | Performed by: INTERNAL MEDICINE

## 2025-03-14 RX ORDER — TORSEMIDE 20 MG/1
20 TABLET ORAL DAILY PRN
Qty: 90 TABLET | Refills: 0 | Status: SHIPPED | OUTPATIENT
Start: 2025-03-14 | End: 2025-04-13

## 2025-03-14 RX ORDER — TORSEMIDE 20 MG/1
20 TABLET ORAL DAILY
Qty: 30 TABLET | Refills: 0 | Status: SHIPPED | OUTPATIENT
Start: 2025-03-14 | End: 2026-03-14

## 2025-03-14 NOTE — ASSESSMENT & PLAN NOTE
History of LAD stent 2017.  No angina currently.  Single-vessel coronary disease documented by heart catheterization 2021 with patent stent.

## 2025-03-14 NOTE — ASSESSMENT & PLAN NOTE
EF 30-35%.  The hope is that he will have some improvement in ejection fraction with stabilization of AFib.

## 2025-03-14 NOTE — ASSESSMENT & PLAN NOTE
His last echo 2025 February demonstrated normal PA pressures.  Condition improved.    He has both LV systolic dysfunction and advanced renal failure.

## 2025-03-14 NOTE — ASSESSMENT & PLAN NOTE
Today's blood pressure reasonably controlled.  He is still on midodrine.  Currently Toprol 25 mg utilized.    There was recent hospitalization with hypotension.  Current therapy to continue.

## 2025-03-14 NOTE — ASSESSMENT & PLAN NOTE
Last measurement 4.6 cm.  However by CT scan measurement there is no aneurysm.  Nonsurgical severity.    Based on many aortic measurements from CT and MRI no abdominal aortic aneurysm confirmed.

## 2025-03-14 NOTE — ANESTHESIA POSTPROCEDURE EVALUATION
Anesthesia Post Evaluation    Patient: Jarrett Charlton Jr.    Procedure(s) Performed: Procedure(s) (LRB):  EXCISION, PAROTID GLAND (Left)    Final Anesthesia Type: general      Patient location during evaluation: PACU  Patient participation: Yes- Able to Participate  Level of consciousness: awake and alert and oriented  Post-procedure vital signs: reviewed and stable  Pain management: adequate  Airway patency: patent  YUE mitigation strategies: Postoperative administration of CPAP, nasopharyngeal airway, or oral appliance in the postanesthesia care unit (PACU), Multimodal analgesia and Extubation and recovery carried out in lateral, semiupright, or other nonsupine position  PONV status at discharge: No PONV  Anesthetic complications: no      Cardiovascular status: blood pressure returned to baseline and hemodynamically stable  Respiratory status: spontaneous ventilation, unassisted and room air  Hydration status: euvolemic  Follow-up not needed.          Vitals Value Taken Time   /86 12/18/20 1330   Temp 36.3 °C (97.4 °F) 12/18/20 1300   Pulse 80 12/18/20 1353   Resp 9 12/18/20 1331   SpO2 99 % 12/18/20 1353   Vitals shown include unvalidated device data.      Event Time   Out of Recovery 12:00:00         Pain/Jamie Score: Pain Rating Prior to Med Admin: 4 (12/18/2020  1:26 PM)  Pain Rating Post Med Admin: 4 (12/18/2020  1:26 PM)  Jamie Score: 10 (12/18/2020  1:26 PM)         Class 3/4 Obesity with alveloar hypoventilation.  BMI 51.5  Central obesity. Mallampati 4  PCO2 greater than 125 requiring intubation  Patient has been intubated several times in the past.  Unable to tolerate BIPAP with settings of 20/16  Tolerating AVAPS nightly.  Thoracic expansion is limited secondary to severe central obesity and multilevel spinal stenosis and scoliosis. Patient requires that tidal volume that AVAPS-AE delivers for adequate gas exchange. Without AVAPS-AE patient is at a high risk of readmission and possible death from respiratory failure. BIPAP does not have capability to deliver tidal volume and patient cannot tolerate extremely high settings on a BIPAP.

## 2025-03-14 NOTE — PROGRESS NOTES
[facility] Cardiology     Subjective:    Patient ID:  Jarrett Charlton Jr. is a 74 y.o. male who presents for follow-up of Atrial Fibrillation (Hospital follow up), Congestive Heart Failure, CKD 4, Coronary Artery Disease, Hypertension, and Hyperlipidemia    Review of patient's allergies indicates:   Allergen Reactions    Atorvastatin     Rosuvastatin      Amiodarone loading initiated in plans for AFib ablation planned.  He had an echo 2025 February showing EF 30-35 % slightly improved from previous.  LV size normal.  His history includes 2017 LAD stent.  Follow-up angiogram 2021 patent LAD stent otherwise nonobstructive coronary disease confirmed.  He has not had angina.    He recently was admitted for low blood pressure and worsening kidney function.  Stabilization took place.  He is taking Demadex 20 mg daily.  He denies any shortness of breath.  Blood pressure today 136/80 mm Hg.  His rhythm is regular.    There is history of ulcerative colitis with previous ileostomy.  He follows with electrophysiology.  2024 April he had ablation for SVT-atrial tachycardia diagnosis.  He is on Eliquis.  No bleeding problems reported.  He requires ProAmatine.  He is on low-dose metoprolol.  He takes Pravachol for hyperlipidemia.         Review of Systems   Constitutional: Negative for chills, decreased appetite, diaphoresis, fever, malaise/fatigue, night sweats, weight gain and weight loss.   HENT:  Negative for congestion, ear discharge, ear pain, hearing loss, hoarse voice, nosebleeds, odynophagia, sore throat, stridor and tinnitus.    Eyes:  Negative for blurred vision, discharge, double vision, pain, photophobia, redness, vision loss in left eye, vision loss in right eye, visual disturbance and visual halos.   Cardiovascular:  Positive for dyspnea on exertion. Negative for chest pain, claudication, cyanosis, irregular heartbeat, leg swelling,  near-syncope, orthopnea, palpitations, paroxysmal nocturnal dyspnea and syncope.   Respiratory:  Positive for shortness of breath. Negative for cough, hemoptysis, sleep disturbances due to breathing, snoring, sputum production and wheezing.    Endocrine: Negative for cold intolerance, heat intolerance, polydipsia, polyphagia and polyuria.   Hematologic/Lymphatic: Negative for adenopathy and bleeding problem. Does not bruise/bleed easily.   Skin:  Negative for color change, dry skin, flushing, itching, nail changes, poor wound healing, rash, skin cancer, suspicious lesions and unusual hair distribution.   Musculoskeletal:  Negative for arthritis, back pain, falls, gout, joint pain, joint swelling, muscle cramps, muscle weakness, myalgias, neck pain and stiffness.   Gastrointestinal:  Negative for bloating, abdominal pain, anorexia, change in bowel habit, bowel incontinence, constipation, diarrhea, dysphagia, excessive appetite, flatus, heartburn, hematemesis, hematochezia, hemorrhoids, jaundice, melena, nausea and vomiting.   Genitourinary:  Negative for bladder incontinence, decreased libido, dysuria, flank pain, frequency, genital sores, hematuria, hesitancy, incomplete emptying, nocturia and urgency.   Neurological:  Negative for aphonia, brief paralysis, difficulty with concentration, disturbances in coordination, excessive daytime sleepiness, dizziness, focal weakness, headaches, light-headedness, loss of balance, numbness, paresthesias, seizures, sensory change, tremors, vertigo and weakness.   Psychiatric/Behavioral:  Positive for memory loss. Negative for altered mental status, depression, hallucinations, substance abuse, suicidal ideas and thoughts of violence. The patient does not have insomnia and is not nervous/anxious.    Allergic/Immunologic: Negative for hives and persistent infections.        Objective:       Vitals:    03/14/25 0913   BP: 136/78   BP Location: Left arm   Patient Position: Sitting  "  Pulse: 64   Resp: 16   SpO2: 100%   Weight: 70 kg (154 lb 5.2 oz)   Height: 6' 2" (1.88 m)    Physical Exam  Constitutional:       General: He is not in acute distress.     Appearance: He is well-developed. He is not diaphoretic.   HENT:      Head: Normocephalic and atraumatic.      Nose: Nose normal.   Eyes:      General: No scleral icterus.        Right eye: No discharge.      Conjunctiva/sclera: Conjunctivae normal.      Pupils: Pupils are equal, round, and reactive to light.   Neck:      Thyroid: No thyromegaly.      Vascular: No JVD.      Trachea: No tracheal deviation.   Cardiovascular:      Rate and Rhythm: Normal rate and regular rhythm.      Pulses:           Carotid pulses are 2+ on the right side and 2+ on the left side.       Radial pulses are 2+ on the right side and 2+ on the left side.      Heart sounds: Normal heart sounds. No murmur heard.     No friction rub. No gallop.   Pulmonary:      Effort: Pulmonary effort is normal. No respiratory distress.      Breath sounds: Normal breath sounds. No stridor. No wheezing or rales.   Chest:      Chest wall: No tenderness.   Abdominal:      General: Bowel sounds are normal. There is no distension.      Palpations: Abdomen is soft. There is no mass.      Tenderness: There is no abdominal tenderness. There is no guarding or rebound.   Musculoskeletal:         General: No tenderness. Normal range of motion.      Cervical back: Normal range of motion and neck supple.   Lymphadenopathy:      Cervical: No cervical adenopathy.   Skin:     General: Skin is warm and dry.      Coloration: Skin is not pale.      Findings: No erythema or rash.   Neurological:      Mental Status: He is alert and oriented to person, place, and time.      Cranial Nerves: No cranial nerve deficit.      Coordination: Coordination normal.   Psychiatric:         Behavior: Behavior normal.         Thought Content: Thought content normal.         Judgment: Judgment normal.           Assessment: "       1. Pulmonary hypertension    2. Hypotension    3. Paroxysmal ventricular tachycardia    4. PAF (paroxysmal atrial fibrillation)    5. Aortic atherosclerosis    6. Aneurysm of aortic arch without rupture    7. Coronary artery disease involving native coronary artery of native heart without angina pectoris    8. Bifascicular block    9. Chronic combined systolic and diastolic heart failure    10. Hyperlipidemia, unspecified hyperlipidemia type    11. Essential hypertension      Results for orders placed or performed during the hospital encounter of 02/25/25   EKG 12-lead    Collection Time: 02/25/25  4:32 PM   Result Value Ref Range    QRS Duration 156 ms    [facility] QTC Calculation 558 ms   Blood culture x two cultures. Draw prior to antibiotics.    Collection Time: 02/25/25  4:55 PM    Specimen: Peripheral, Antecubital, Left; Blood   Result Value Ref Range    Blood Culture, Routine No growth after 5 days.    Blood culture x two cultures. Draw prior to antibiotics.    Collection Time: 02/25/25  4:55 PM    Specimen: Peripheral, Forearm, Right; Blood   Result Value Ref Range    Blood Culture, Routine No growth after 5 days.    CBC auto differential    Collection Time: 02/25/25  4:55 PM   Result Value Ref Range    WBC 3.13 (L) 3.90 - 12.70 K/uL    RBC 4.09 (L) 4.60 - 6.20 M/uL    Hemoglobin 11.5 (L) 14.0 - 18.0 g/dL    Hematocrit 36.1 (L) 40.0 - 54.0 %    MCV 88 82 - 98 fL    MCH 28.1 27.0 - 31.0 pg    MCHC 31.9 (L) 32.0 - 36.0 g/dL    RDW 19.5 (H) 11.5 - 14.5 %    Platelets 188 150 - 450 K/uL    MPV 11.1 9.2 - 12.9 fL    Immature Granulocytes 0.6 (H) 0.0 - 0.5 %    Gran # (ANC) 2.1 1.8 - 7.7 K/uL    Immature Grans (Abs) 0.02 0.00 - 0.04 K/uL    Lymph # 0.6 (L) 1.0 - 4.8 K/uL    Mono # 0.3 0.3 - 1.0 K/uL    Eos # 0.0 0.0 - 0.5 K/uL    Baso # 0.01 0.00 - 0.20 K/uL    nRBC 0 0 /100 WBC    Gran % 67.5 38.0 - 73.0 %    Lymph % 20.4 18.0 - 48.0 %    Mono % 10.9 4.0 - 15.0 %    Eosinophil % 0.3 0.0 - 8.0 %    Basophil % 0.3  0.0 - 1.9 %    Differential Method Automated    Comprehensive metabolic panel    Collection Time: 02/25/25  4:55 PM   Result Value Ref Range    Sodium 135 (L) 136 - 145 mmol/L    Potassium 4.0 3.5 - 5.1 mmol/L    Chloride 98 95 - 110 mmol/L    CO2 19 (L) 23 - 29 mmol/L    Glucose 101 70 - 110 mg/dL    BUN 93 (H) 8 - 23 mg/dL    Creatinine 5.3 (H) 0.5 - 1.4 mg/dL    Calcium 8.6 (L) 8.7 - 10.5 mg/dL    Total Protein 6.7 6.0 - 8.4 g/dL    Albumin 3.6 3.5 - 5.2 g/dL    Total Bilirubin 0.7 0.1 - 1.0 mg/dL    Alkaline Phosphatase 146 40 - 150 U/L    AST 83 (H) 10 - 40 U/L    ALT 73 (H) 10 - 44 U/L    eGFR 10.6 (A) >60 mL/min/1.73 m^2    Anion Gap 18 (H) 8 - 16 mmol/L   Troponin I High Sensitivity    Collection Time: 02/25/25  4:55 PM   Result Value Ref Range    Troponin I High Sensitivity 43 (H) 0 - 35 ng/L   BNP    Collection Time: 02/25/25  4:55 PM   Result Value Ref Range     (H) 0 - 99 pg/mL   EKG 12-lead    Collection Time: 02/25/25  5:00 PM   Result Value Ref Range    QRS Duration 156 ms    [facility] QTC Calculation 545 ms   POCT Capillary Blood Gas-Resp    Collection Time: 02/25/25  5:08 PM   Result Value Ref Range    POC PH 7.377     POC PCO2 44.6 mmHg    POC PO2 16.1 mmHg    POC Lactate 1.5 0.5 - 2.2 mmol/L    POC Hematocrit 37.2 %    Correct Temperature (PH) 7.377     Corrected Temperature (pCO2) 44.6 mmHg    Corrected Temperature (pO2) 16.1 mmHg    POC HCO3 23.4 mmol/l    Base Deficit 0.7 mmol/l    POC Temp 37.0 C    Specimen source Venous     Performed By: SLee     FiO2 21.0 %    BIPAP 0    Influenza A & B by Molecular    Collection Time: 02/25/25  5:51 PM    Specimen: Nasopharyngeal Swab   Result Value Ref Range    Influenza A, Molecular Invalid Negative    Influenza B, Molecular Invalid Negative    Flu A & B Source Nasal swab    COVID-19 Rapid Screening    Collection Time: 02/25/25  5:51 PM   Result Value Ref Range    SARS-CoV-2 RNA, Amplification, Qual Negative Negative   Urine culture    Collection Time:  02/25/25  7:44 PM    Specimen: Clean Catch; Urine   Result Value Ref Range    Urine Culture, Routine No growth    Urinalysis, Reflex to Urine Culture Urine, Clean Catch    Collection Time: 02/25/25  7:44 PM    Specimen: Urine   Result Value Ref Range    Specimen UA Urine, Clean Catch     Color, UA Yellow Yellow, Straw, Stacy    Appearance, UA Clear Clear    pH, UA 5.0 5.0 - 8.0    Specific Gravity, UA 1.015 1.005 - 1.030    Protein, UA Negative Negative    Glucose, UA Negative Negative    Ketones, UA Negative Negative    Bilirubin (UA) Negative Negative    Occult Blood UA Negative Negative    Nitrite, UA Negative Negative    Leukocytes, UA Negative Negative   POCT glucose    Collection Time: 02/25/25  9:17 PM   Result Value Ref Range    POCT Glucose 96 70 - 110 mg/dL   Troponin I High Sensitivity    Collection Time: 02/25/25  9:30 PM   Result Value Ref Range    Troponin I High Sensitivity 30 0 - 35 ng/L   POCT glucose    Collection Time: 02/25/25  9:34 PM   Result Value Ref Range    POCT Glucose 96 70 - 110 mg/dL   Comprehensive Metabolic Panel (CMP)    Collection Time: 02/26/25  4:29 AM   Result Value Ref Range    Sodium 136 136 - 145 mmol/L    Potassium 3.8 3.5 - 5.1 mmol/L    Chloride 99 95 - 110 mmol/L    CO2 22 (L) 23 - 29 mmol/L    Glucose 85 70 - 110 mg/dL    BUN 94 (H) 8 - 23 mg/dL    Creatinine 5.6 (H) 0.5 - 1.4 mg/dL    Calcium 8.4 (L) 8.7 - 10.5 mg/dL    Total Protein 6.3 6.0 - 8.4 g/dL    Albumin 3.3 (L) 3.5 - 5.2 g/dL    Total Bilirubin 0.6 0.1 - 1.0 mg/dL    Alkaline Phosphatase 138 40 - 150 U/L    AST 87 (H) 10 - 40 U/L    ALT 79 (H) 10 - 44 U/L    eGFR 9.9 (A) >60 mL/min/1.73 m^2    Anion Gap 15 8 - 16 mmol/L   Magnesium    Collection Time: 02/26/25  4:29 AM   Result Value Ref Range    Magnesium 1.4 (L) 1.6 - 2.6 mg/dL   Phosphorus    Collection Time: 02/26/25  4:29 AM   Result Value Ref Range    Phosphorus 5.7 (H) 2.7 - 4.5 mg/dL   CBC with Automated Differential    Collection Time: 02/26/25  4:29  AM   Result Value Ref Range    WBC 3.15 (L) 3.90 - 12.70 K/uL    RBC 3.95 (L) 4.60 - 6.20 M/uL    Hemoglobin 11.0 (L) 14.0 - 18.0 g/dL    Hematocrit 34.6 (L) 40.0 - 54.0 %    MCV 88 82 - 98 fL    MCH 27.8 27.0 - 31.0 pg    MCHC 31.8 (L) 32.0 - 36.0 g/dL    RDW 19.2 (H) 11.5 - 14.5 %    Platelets 130 (L) 150 - 450 K/uL    MPV 9.8 9.2 - 12.9 fL    Immature Granulocytes 0.3 0.0 - 0.5 %    Gran # (ANC) 1.9 1.8 - 7.7 K/uL    Immature Grans (Abs) 0.01 0.00 - 0.04 K/uL    Lymph # 0.9 (L) 1.0 - 4.8 K/uL    Mono # 0.3 0.3 - 1.0 K/uL    Eos # 0.0 0.0 - 0.5 K/uL    Baso # 0.01 0.00 - 0.20 K/uL    nRBC 0 0 /100 WBC    Gran % 60.3 38.0 - 73.0 %    Lymph % 28.3 18.0 - 48.0 %    Mono % 9.8 4.0 - 15.0 %    Eosinophil % 1.0 0.0 - 8.0 %    Basophil % 0.3 0.0 - 1.9 %    Differential Method Automated    POCT glucose    Collection Time: 02/26/25  7:07 AM   Result Value Ref Range    POCT Glucose 103 70 - 110 mg/dL   Echo    Collection Time: 02/26/25  9:01 AM   Result Value Ref Range    LV Diastolic Volume 134 mL    Echo EF Estimated 43 %    LV Systolic Volume 76 mL    IVS 1.1 0.6 - 1.1 cm    LVIDd 5.3 3.5 - 6.0 cm    LVIDs 4.2 (A) 2.1 - 4.0 cm    LVOT diameter 2.2 cm    PW 1.4 (A) 0.6 - 1.1 cm    AV LVOT peak gradient 2 mmHg    LVOT mn grad 1 mmHg    LVOT peak joao 0.7 m/s    LVOT peak VTI 12.5 cm    RV- vázquez basal diam 3.1 cm    LA size 5.1 cm    Left Atrium Major Axis 8.2 cm    Left Atrium Minor Axis 6.9 cm    RA Major Axis 6.69 cm    AV valve area 2.5 cm2    AV area by cont VTI 2.4 cm2    AV peak gradient 5 mmHg    AV mean gradient 3 mmHg    Ao peak joao 1.1 m/s    Ao VTI 19.3 cm    MV Peak A Joao 1.02 m/s    E wave deceleration time 307 ms    E wave deceleration time 321 ms    MV Peak E Joao 0.61 m/s    E/A ratio 0.60     LV LATERAL E/E' RATIO 8.7     LV SEPTAL E/E' RATIO 12.2     TDI LATERAL 0.07 m/s    TDI SEPTAL 0.05 m/s    TV peak gradient 25 mmHg    TR Max Joao 2.5 m/s    STJ 3.45 cm    Sinus 3.36 cm    LA WIDTH 3.6 cm    RA Width  3.98 cm    TAPSE 2.55 cm    BSA 1.83 m2    LVOT stroke volume 47.5 cm3    LV Systolic Volume Index 40.4 mL/m2    LV Diastolic Volume Index 71.28 mL/m2    LVOT area 3.8 cm2    FS 20.8 (A) 28 - 44 %    Left Ventricle Relative Wall Thickness 0.53 cm    LV mass 271.6 g    LV Mass Index 144.4 g/m2    E/E' ratio 10 m/s    JAKI 62 mL/m2    LA Vol 117 cm3    RV/LV Ratio 0.58 cm    AV Velocity Ratio 0.64     AV index (prosthetic) 0.65     KJ by Velocity Ratio 2.4 cm²    Mean e' 0.06 m/s    ZLVIDS 2.06     ZLVIDD 0.09     TV resting pulmonary artery pressure 33 mmHg    RV TB RVSP 11 mmHg    Est. RA pres 8 mmHg    LA Vol (MOD) 72 mL    JAKI (MOD) 38 mL/m2    Inman's Biplane MOD Ejection Fraction 30 %   POCT glucose    Collection Time: 02/26/25 11:12 AM   Result Value Ref Range    POCT Glucose 108 70 - 110 mg/dL   SARS-Cov2 (COVID) with FLU/RSV by PCR    Collection Time: 02/26/25 11:35 AM   Result Value Ref Range    SARS-CoV2 (COVID-19) Qualitative PCR Not Detected Not Detected    Influenza A, Molecular Detected (A) Not Detected    Influenza B, Molecular Not Detected Not Detected    RSV Ag by Molecular Method Not Detected Not Detected   Sodium, urine, random    Collection Time: 02/26/25  1:49 PM   Result Value Ref Range    Sodium, Urine 17 (L) 20 - 250 mmol/L   Osmolality, urine    Collection Time: 02/26/25  1:49 PM   Result Value Ref Range    Osmolality, Urine 350 50 - 1200 mOsm/kg   Creatinine, urine, random    Collection Time: 02/26/25  1:49 PM   Result Value Ref Range    Creatinine, Urine 59.0 23.0 - 375.0 mg/dL   Urea nitrogen, urine    Collection Time: 02/26/25  1:49 PM   Result Value Ref Range    Urine Urea Nitrogen 550 140 - 1050 mg/dL   POCT glucose    Collection Time: 02/26/25  3:26 PM   Result Value Ref Range    POCT Glucose 118 (H) 70 - 110 mg/dL   POCT glucose    Collection Time: 02/26/25  8:55 PM   Result Value Ref Range    POCT Glucose 173 (H) 70 - 110 mg/dL   Comprehensive Metabolic Panel (CMP)    Collection  Time: 02/27/25  3:55 AM   Result Value Ref Range    Sodium 135 (L) 136 - 145 mmol/L    Potassium 3.7 3.5 - 5.1 mmol/L    Chloride 101 95 - 110 mmol/L    CO2 19 (L) 23 - 29 mmol/L    Glucose 89 70 - 110 mg/dL    BUN 92 (H) 8 - 23 mg/dL    Creatinine 5.1 (H) 0.5 - 1.4 mg/dL    Calcium 8.1 (L) 8.7 - 10.5 mg/dL    Total Protein 5.5 (L) 6.0 - 8.4 g/dL    Albumin 2.9 (L) 3.5 - 5.2 g/dL    Total Bilirubin 0.6 0.1 - 1.0 mg/dL    Alkaline Phosphatase 115 40 - 150 U/L    AST 86 (H) 10 - 40 U/L    ALT 84 (H) 10 - 44 U/L    eGFR 11.1 (A) >60 mL/min/1.73 m^2    Anion Gap 15 8 - 16 mmol/L   Magnesium    Collection Time: 02/27/25  3:55 AM   Result Value Ref Range    Magnesium 1.7 1.6 - 2.6 mg/dL   Phosphorus    Collection Time: 02/27/25  3:55 AM   Result Value Ref Range    Phosphorus 5.2 (H) 2.7 - 4.5 mg/dL   CBC with Automated Differential    Collection Time: 02/27/25  3:55 AM   Result Value Ref Range    WBC 3.06 (L) 3.90 - 12.70 K/uL    RBC 3.70 (L) 4.60 - 6.20 M/uL    Hemoglobin 10.1 (L) 14.0 - 18.0 g/dL    Hematocrit 32.0 (L) 40.0 - 54.0 %    MCV 87 82 - 98 fL    MCH 27.3 27.0 - 31.0 pg    MCHC 31.6 (L) 32.0 - 36.0 g/dL    RDW 18.9 (H) 11.5 - 14.5 %    Platelets 105 (L) 150 - 450 K/uL    MPV SEE COMMENT 9.2 - 12.9 fL    Immature Granulocytes 0.3 0.0 - 0.5 %    Gran # (ANC) 1.9 1.8 - 7.7 K/uL    Immature Grans (Abs) 0.01 0.00 - 0.04 K/uL    Lymph # 0.9 (L) 1.0 - 4.8 K/uL    Mono # 0.2 (L) 0.3 - 1.0 K/uL    Eos # 0.1 0.0 - 0.5 K/uL    Baso # 0.01 0.00 - 0.20 K/uL    nRBC 0 0 /100 WBC    Gran % 60.9 38.0 - 73.0 %    Lymph % 28.4 18.0 - 48.0 %    Mono % 7.8 4.0 - 15.0 %    Eosinophil % 2.3 0.0 - 8.0 %    Basophil % 0.3 0.0 - 1.9 %    Differential Method Automated    POCT glucose    Collection Time: 02/27/25  7:07 AM   Result Value Ref Range    POCT Glucose 138 (H) 70 - 110 mg/dL   POCT glucose    Collection Time: 02/27/25 11:10 AM   Result Value Ref Range    POCT Glucose 188 (H) 70 - 110 mg/dL   POCT glucose    Collection Time:  02/27/25  3:31 PM   Result Value Ref Range    POCT Glucose 119 (H) 70 - 110 mg/dL   POCT glucose    Collection Time: 02/27/25  8:17 PM   Result Value Ref Range    POCT Glucose 151 (H) 70 - 110 mg/dL   Comprehensive Metabolic Panel (CMP)    Collection Time: 02/28/25  6:10 AM   Result Value Ref Range    Sodium 135 (L) 136 - 145 mmol/L    Potassium 3.9 3.5 - 5.1 mmol/L    Chloride 102 95 - 110 mmol/L    CO2 19 (L) 23 - 29 mmol/L    Glucose 86 70 - 110 mg/dL    BUN 74 (H) 8 - 23 mg/dL    Creatinine 4.6 (H) 0.5 - 1.4 mg/dL    Calcium 8.3 (L) 8.7 - 10.5 mg/dL    Total Protein 5.4 (L) 6.0 - 8.4 g/dL    Albumin 2.8 (L) 3.5 - 5.2 g/dL    Total Bilirubin 0.7 0.1 - 1.0 mg/dL    Alkaline Phosphatase 118 40 - 150 U/L    AST 72 (H) 10 - 40 U/L    ALT 79 (H) 10 - 44 U/L    eGFR 12.6 (A) >60 mL/min/1.73 m^2    Anion Gap 14 8 - 16 mmol/L   Magnesium    Collection Time: 02/28/25  6:10 AM   Result Value Ref Range    Magnesium 1.8 1.6 - 2.6 mg/dL   Phosphorus    Collection Time: 02/28/25  6:10 AM   Result Value Ref Range    Phosphorus 3.9 2.7 - 4.5 mg/dL   CBC with Automated Differential    Collection Time: 02/28/25  6:10 AM   Result Value Ref Range    WBC 3.04 (L) 3.90 - 12.70 K/uL    RBC 3.69 (L) 4.60 - 6.20 M/uL    Hemoglobin 10.1 (L) 14.0 - 18.0 g/dL    Hematocrit 32.3 (L) 40.0 - 54.0 %    MCV 88 82 - 98 fL    MCH 27.4 27.0 - 31.0 pg    MCHC 31.3 (L) 32.0 - 36.0 g/dL    RDW 18.5 (H) 11.5 - 14.5 %    Platelets 89 (L) 150 - 450 K/uL    MPV SEE COMMENT 9.2 - 12.9 fL    Immature Granulocytes 0.3 0.0 - 0.5 %    Gran # (ANC) 1.6 (L) 1.8 - 7.7 K/uL    Immature Grans (Abs) 0.01 0.00 - 0.04 K/uL    Lymph # 1.1 1.0 - 4.8 K/uL    Mono # 0.3 0.3 - 1.0 K/uL    Eos # 0.1 0.0 - 0.5 K/uL    Baso # 0.01 0.00 - 0.20 K/uL    nRBC 0 0 /100 WBC    Gran % 53.7 38.0 - 73.0 %    Lymph % 34.5 18.0 - 48.0 %    Mono % 8.6 4.0 - 15.0 %    Eosinophil % 2.6 0.0 - 8.0 %    Basophil % 0.3 0.0 - 1.9 %    Differential Method Automated    Hepatitis panel, acute     Collection Time: 02/28/25  6:10 AM   Result Value Ref Range    Hepatitis B Surface Ag Non-reactive Non-reactive    Hep B C IgM Non-reactive Non-reactive    Hep A IgM Non-reactive Non-reactive    Hepatitis C Ab Non-reactive Non-reactive   AFP tumor marker    Collection Time: 02/28/25  6:10 AM   Result Value Ref Range    AFP <2.0 0.0 - 8.4 ng/mL   POCT glucose    Collection Time: 02/28/25  7:56 AM   Result Value Ref Range    POCT Glucose 78 70 - 110 mg/dL   POCT glucose    Collection Time: 02/28/25 11:07 AM   Result Value Ref Range    POCT Glucose 168 (H) 70 - 110 mg/dL   POCT glucose    Collection Time: 02/28/25  3:43 PM   Result Value Ref Range    POCT Glucose 148 (H) 70 - 110 mg/dL   POCT glucose    Collection Time: 02/28/25  8:46 PM   Result Value Ref Range    POCT Glucose 119 (H) 70 - 110 mg/dL   Comprehensive Metabolic Panel (CMP)    Collection Time: 03/01/25  4:20 AM   Result Value Ref Range    Sodium 136 136 - 145 mmol/L    Potassium 4.2 3.5 - 5.1 mmol/L    Chloride 103 95 - 110 mmol/L    CO2 23 23 - 29 mmol/L    Glucose 87 70 - 110 mg/dL    BUN 77 (H) 8 - 23 mg/dL    Creatinine 4.3 (H) 0.5 - 1.4 mg/dL    Calcium 8.5 (L) 8.7 - 10.5 mg/dL    Total Protein 5.6 (L) 6.0 - 8.4 g/dL    Albumin 2.8 (L) 3.5 - 5.2 g/dL    Total Bilirubin 0.7 0.1 - 1.0 mg/dL    Alkaline Phosphatase 128 40 - 150 U/L    AST 63 (H) 10 - 40 U/L    ALT 76 (H) 10 - 44 U/L    eGFR 13.6 (A) >60 mL/min/1.73 m^2    Anion Gap 10 8 - 16 mmol/L   Magnesium    Collection Time: 03/01/25  4:20 AM   Result Value Ref Range    Magnesium 1.8 1.6 - 2.6 mg/dL   Phosphorus    Collection Time: 03/01/25  4:20 AM   Result Value Ref Range    Phosphorus 3.8 2.7 - 4.5 mg/dL   CBC with Automated Differential    Collection Time: 03/01/25  4:20 AM   Result Value Ref Range    WBC 3.14 (L) 3.90 - 12.70 K/uL    RBC 3.83 (L) 4.60 - 6.20 M/uL    Hemoglobin 10.4 (L) 14.0 - 18.0 g/dL    Hematocrit 33.8 (L) 40.0 - 54.0 %    MCV 88 82 - 98 fL    MCH 27.2 27.0 - 31.0 pg    MCHC  30.8 (L) 32.0 - 36.0 g/dL    RDW 18.5 (H) 11.5 - 14.5 %    Platelets 84 (L) 150 - 450 K/uL    MPV SEE COMMENT 9.2 - 12.9 fL    Immature Granulocytes 1.0 (H) 0.0 - 0.5 %    Gran # (ANC) 1.8 1.8 - 7.7 K/uL    Immature Grans (Abs) 0.03 0.00 - 0.04 K/uL    Lymph # 1.0 1.0 - 4.8 K/uL    Mono # 0.2 (L) 0.3 - 1.0 K/uL    Eos # 0.1 0.0 - 0.5 K/uL    Baso # 0.02 0.00 - 0.20 K/uL    nRBC 0 0 /100 WBC    Gran % 56.1 38.0 - 73.0 %    Lymph % 31.8 18.0 - 48.0 %    Mono % 7.6 4.0 - 15.0 %    Eosinophil % 2.9 0.0 - 8.0 %    Basophil % 0.6 0.0 - 1.9 %    Differential Method Automated    Protime-INR    Collection Time: 03/01/25  4:20 AM   Result Value Ref Range    Prothrombin Time 12.1 9.0 - 12.5 sec    INR 1.1 0.8 - 1.2   POCT glucose    Collection Time: 03/01/25  7:25 AM   Result Value Ref Range    POCT Glucose 103 70 - 110 mg/dL   POCT glucose    Collection Time: 03/01/25 11:59 AM   Result Value Ref Range    POCT Glucose 130 (H) 70 - 110 mg/dL   POCT glucose    Collection Time: 03/01/25  6:10 PM   Result Value Ref Range    POCT Glucose 135 (H) 70 - 110 mg/dL   POCT glucose    Collection Time: 03/01/25  8:38 PM   Result Value Ref Range    POCT Glucose 182 (H) 70 - 110 mg/dL   POCT glucose    Collection Time: 03/02/25  7:35 AM   Result Value Ref Range    POCT Glucose 112 (H) 70 - 110 mg/dL   Comprehensive Metabolic Panel (CMP)    Collection Time: 03/02/25  8:06 AM   Result Value Ref Range    Sodium 135 (L) 136 - 145 mmol/L    Potassium 4.5 3.5 - 5.1 mmol/L    Chloride 101 95 - 110 mmol/L    CO2 22 (L) 23 - 29 mmol/L    Glucose 114 (H) 70 - 110 mg/dL    BUN 63 (H) 8 - 23 mg/dL    Creatinine 3.8 (H) 0.5 - 1.4 mg/dL    Calcium 8.9 8.7 - 10.5 mg/dL    Total Protein 6.1 6.0 - 8.4 g/dL    Albumin 3.1 (L) 3.5 - 5.2 g/dL    Total Bilirubin 0.9 0.1 - 1.0 mg/dL    Alkaline Phosphatase 143 40 - 150 U/L    AST 55 (H) 10 - 40 U/L    ALT 70 (H) 10 - 44 U/L    eGFR 15.8 (A) >60 mL/min/1.73 m^2    Anion Gap 12 8 - 16 mmol/L   Magnesium     Collection Time: 03/02/25  8:06 AM   Result Value Ref Range    Magnesium 1.7 1.6 - 2.6 mg/dL   Phosphorus    Collection Time: 03/02/25  8:06 AM   Result Value Ref Range    Phosphorus 3.3 2.7 - 4.5 mg/dL   CBC with Automated Differential    Collection Time: 03/02/25  8:06 AM   Result Value Ref Range    WBC 3.27 (L) 3.90 - 12.70 K/uL    RBC 3.93 (L) 4.60 - 6.20 M/uL    Hemoglobin 10.9 (L) 14.0 - 18.0 g/dL    Hematocrit 34.9 (L) 40.0 - 54.0 %    MCV 89 82 - 98 fL    MCH 27.7 27.0 - 31.0 pg    MCHC 31.2 (L) 32.0 - 36.0 g/dL    RDW 18.3 (H) 11.5 - 14.5 %    Platelets 81 (L) 150 - 450 K/uL    MPV SEE COMMENT 9.2 - 12.9 fL    Immature Granulocytes 0.3 0.0 - 0.5 %    Gran # (ANC) 1.8 1.8 - 7.7 K/uL    Immature Grans (Abs) 0.01 0.00 - 0.04 K/uL    Lymph # 1.1 1.0 - 4.8 K/uL    Mono # 0.3 0.3 - 1.0 K/uL    Eos # 0.1 0.0 - 0.5 K/uL    Baso # 0.01 0.00 - 0.20 K/uL    nRBC 0 0 /100 WBC    Gran % 55.6 38.0 - 73.0 %    Lymph % 33.0 18.0 - 48.0 %    Mono % 8.0 4.0 - 15.0 %    Eosinophil % 2.8 0.0 - 8.0 %    Basophil % 0.3 0.0 - 1.9 %    Differential Method Automated    POCT glucose    Collection Time: 03/02/25 12:22 PM   Result Value Ref Range    POCT Glucose 187 (H) 70 - 110 mg/dL   POCT glucose    Collection Time: 03/02/25  3:41 PM   Result Value Ref Range    POCT Glucose 116 (H) 70 - 110 mg/dL   POCT glucose    Collection Time: 03/02/25  9:34 PM   Result Value Ref Range    POCT Glucose 109 70 - 110 mg/dL   POCT glucose    Collection Time: 03/03/25  7:35 AM   Result Value Ref Range    POCT Glucose 109 70 - 110 mg/dL   Comprehensive Metabolic Panel (CMP)    Collection Time: 03/03/25  9:07 AM   Result Value Ref Range    Sodium 135 (L) 136 - 145 mmol/L    Potassium 4.4 3.5 - 5.1 mmol/L    Chloride 102 95 - 110 mmol/L    CO2 24 23 - 29 mmol/L    Glucose 106 70 - 110 mg/dL    BUN 64 (H) 8 - 23 mg/dL    Creatinine 3.5 (H) 0.5 - 1.4 mg/dL    Calcium 8.6 (L) 8.7 - 10.5 mg/dL    Total Protein 5.7 (L) 6.0 - 8.4 g/dL    Albumin 3.0 (L) 3.5  - 5.2 g/dL    Total Bilirubin 1.0 0.1 - 1.0 mg/dL    Alkaline Phosphatase 137 40 - 150 U/L    AST 43 (H) 10 - 40 U/L    ALT 56 (H) 10 - 44 U/L    eGFR 17.5 (A) >60 mL/min/1.73 m^2    Anion Gap 9 8 - 16 mmol/L   Magnesium    Collection Time: 03/03/25  9:07 AM   Result Value Ref Range    Magnesium 1.5 (L) 1.6 - 2.6 mg/dL   Phosphorus    Collection Time: 03/03/25  9:07 AM   Result Value Ref Range    Phosphorus 3.2 2.7 - 4.5 mg/dL   CBC with Automated Differential    Collection Time: 03/03/25  9:07 AM   Result Value Ref Range    WBC 3.33 (L) 3.90 - 12.70 K/uL    RBC 3.74 (L) 4.60 - 6.20 M/uL    Hemoglobin 10.3 (L) 14.0 - 18.0 g/dL    Hematocrit 33.0 (L) 40.0 - 54.0 %    MCV 88 82 - 98 fL    MCH 27.5 27.0 - 31.0 pg    MCHC 31.2 (L) 32.0 - 36.0 g/dL    RDW 18.6 (H) 11.5 - 14.5 %    Platelets 78 (L) 150 - 450 K/uL    MPV SEE COMMENT 9.2 - 12.9 fL    Immature Granulocytes 0.3 0.0 - 0.5 %    Gran # (ANC) 1.8 1.8 - 7.7 K/uL    Immature Grans (Abs) 0.01 0.00 - 0.04 K/uL    Lymph # 1.1 1.0 - 4.8 K/uL    Mono # 0.3 0.3 - 1.0 K/uL    Eos # 0.1 0.0 - 0.5 K/uL    Baso # 0.01 0.00 - 0.20 K/uL    nRBC 0 0 /100 WBC    Gran % 55.0 38.0 - 73.0 %    Lymph % 33.9 18.0 - 48.0 %    Mono % 7.8 4.0 - 15.0 %    Eosinophil % 2.7 0.0 - 8.0 %    Basophil % 0.3 0.0 - 1.9 %    Differential Method Automated    Cancer antigen 19-9    Collection Time: 03/03/25  9:07 AM   Result Value Ref Range    CA 19-9 16.6 0.0 - 40.0 U/mL   POCT glucose    Collection Time: 03/03/25  8:42 PM   Result Value Ref Range    POCT Glucose 146 (H) 70 - 110 mg/dL   Comprehensive Metabolic Panel (CMP)    Collection Time: 03/04/25  3:50 AM   Result Value Ref Range    Sodium 136 136 - 145 mmol/L    Potassium 4.4 3.5 - 5.1 mmol/L    Chloride 104 95 - 110 mmol/L    CO2 23 23 - 29 mmol/L    Glucose 109 70 - 110 mg/dL    BUN 60 (H) 8 - 23 mg/dL    Creatinine 3.1 (H) 0.5 - 1.4 mg/dL    Calcium 8.3 (L) 8.7 - 10.5 mg/dL    Total Protein 5.2 (L) 6.0 - 8.4 g/dL    Albumin 2.6 (L) 3.5 -  5.2 g/dL    Total Bilirubin 0.8 0.1 - 1.0 mg/dL    Alkaline Phosphatase 129 40 - 150 U/L    AST 34 10 - 40 U/L    ALT 43 10 - 44 U/L    eGFR 20.2 (A) >60 mL/min/1.73 m^2    Anion Gap 9 8 - 16 mmol/L   Magnesium    Collection Time: 03/04/25  3:50 AM   Result Value Ref Range    Magnesium 2.0 1.6 - 2.6 mg/dL   Phosphorus    Collection Time: 03/04/25  3:50 AM   Result Value Ref Range    Phosphorus 3.3 2.7 - 4.5 mg/dL   CBC with Automated Differential    Collection Time: 03/04/25  3:50 AM   Result Value Ref Range    WBC 3.07 (L) 3.90 - 12.70 K/uL    RBC 3.36 (L) 4.60 - 6.20 M/uL    Hemoglobin 9.3 (L) 14.0 - 18.0 g/dL    Hematocrit 30.0 (L) 40.0 - 54.0 %    MCV 89 82 - 98 fL    MCH 27.7 27.0 - 31.0 pg    MCHC 31.0 (L) 32.0 - 36.0 g/dL    RDW 18.6 (H) 11.5 - 14.5 %    Platelets 76 (L) 150 - 450 K/uL    MPV SEE COMMENT 9.2 - 12.9 fL    Immature Granulocytes 0.7 (H) 0.0 - 0.5 %    Gran # (ANC) 1.9 1.8 - 7.7 K/uL    Immature Grans (Abs) 0.02 0.00 - 0.04 K/uL    Lymph # 0.8 (L) 1.0 - 4.8 K/uL    Mono # 0.3 0.3 - 1.0 K/uL    Eos # 0.1 0.0 - 0.5 K/uL    Baso # 0.00 0.00 - 0.20 K/uL    nRBC 0 0 /100 WBC    Gran % 62.6 38.0 - 73.0 %    Lymph % 24.4 18.0 - 48.0 %    Mono % 9.4 4.0 - 15.0 %    Eosinophil % 2.9 0.0 - 8.0 %    Basophil % 0.0 0.0 - 1.9 %    Differential Method Automated    POCT glucose    Collection Time: 03/04/25  7:21 AM   Result Value Ref Range    POCT Glucose 114 (H) 70 - 110 mg/dL     *Note: Due to a large number of results and/or encounters for the requested time period, some results have not been displayed. A complete set of results can be found in Results Review.       Current Medications[1]     Lab Results   Component Value Date    WBC 3.07 (L) 03/04/2025    RBC 3.36 (L) 03/04/2025    HGB 9.3 (L) 03/04/2025    HCT 30.0 (L) 03/04/2025    MCV 89 03/04/2025    MCH 27.7 03/04/2025    MCHC 31.0 (L) 03/04/2025    RDW 18.6 (H) 03/04/2025    PLT 76 (L) 03/04/2025    MPV SEE COMMENT 03/04/2025    GRAN 1.9 03/04/2025     GRAN 62.6 03/04/2025    LYMPH 0.8 (L) 03/04/2025    LYMPH 24.4 03/04/2025    MONO 0.3 03/04/2025    MONO 9.4 03/04/2025    EOS 0.1 03/04/2025    BASO 0.00 03/04/2025    EOSINOPHIL 2.9 03/04/2025    BASOPHIL 0.0 03/04/2025    MG 2.0 03/04/2025        CMP  Lab Results   Component Value Date     03/04/2025    K 4.4 03/04/2025     03/04/2025    CO2 23 03/04/2025     03/04/2025    BUN 60 (H) 03/04/2025    CREATININE 3.1 (H) 03/04/2025    CALCIUM 8.3 (L) 03/04/2025    PROT 5.2 (L) 03/04/2025    ALBUMIN 2.6 (L) 03/04/2025    BILITOT 0.8 03/04/2025    ALKPHOS 129 03/04/2025    AST 34 03/04/2025    ALT 43 03/04/2025    ANIONGAP 9 03/04/2025    ESTGFRAFRICA 18.9 (A) 07/27/2022    EGFRNONAA 16.3 (A) 07/27/2022        Lab Results   Component Value Date    LABBLOO No growth after 5 days. 02/25/2025    LABBLOO No growth after 5 days. 02/25/2025    LABURIN No growth 02/25/2025            Results for orders placed or performed during the hospital encounter of 02/25/25   EKG 12-lead    Collection Time: 02/25/25  5:00 PM   Result Value Ref Range    QRS Duration 156 ms    [facility] QTC Calculation 545 ms    Narrative    Test Reason : Z13.6,    Vent. Rate :  67 BPM     Atrial Rate :  67 BPM     P-R Int : 192 ms          QRS Dur : 156 ms      QT Int : 516 ms       P-R-T Axes :  29 -81  47 degrees    QTcB Int : 545 ms    Normal sinus rhythm  Left axis deviation  Right bundle branch block  Anterior infarct (cited on or before 12-Jul-2023)  Abnormal ECG  When compared with ECG of 25-Feb-2025 16:32,  Questionable change in initial forces of Septal leads  Confirmed by Jamie Whitten ([id number]) on 2/26/2025 7:53:15 AM    Referred By: [email or web address] SELF           Confirmed By: Jamie Whitten        X-ray Shoulder 2 or More Views Right 03/13/2025 [id number] Final   Cardiac monitoring strips 03/04/2025  Final   Cardiac monitoring strips 03/04/2025  Final             Plan:       Problem List Items Addressed This Visit           Cardiac/Vascular    Essential hypertension    Today's blood pressure reasonably controlled.  He is still on midodrine.  Currently Toprol 25 mg utilized.    There was recent hospitalization with hypotension.  Current therapy to continue.         HLD (hyperlipidemia)    LDL 71 by labs 2024 August.  Pravastatin to continue.         CAD (coronary artery disease)    History of LAD stent 2017.  No angina currently.  Single-vessel coronary disease documented by heart catheterization 2021 with patent stent.         Aortic atherosclerosis    Condition stable.         Paroxysmal ventricular tachycardia    Detected in the past.  He follows with EP.         Aortic aneurysm    Last measurement 4.6 cm.  However by CT scan measurement there is no aneurysm.  Nonsurgical severity.    Based on many aortic measurements from CT and MRI no abdominal aortic aneurysm confirmed.         Bifascicular block    Condition unchanged.         Pulmonary hypertension - Primary    His last echo 2025 February demonstrated normal PA pressures.  Condition improved.    He has both LV systolic dysfunction and advanced renal failure.         Chronic combined systolic and diastolic heart failure    EF 30-35%.  The hope is that he will have some improvement in ejection fraction with stabilization of AFib.         PAF (paroxysmal atrial fibrillation)    There are plans for repeat ablation.  Amiodarone loading carried out.  Last echo shows EF 30-35%.          Other Visit Diagnoses         Hypotension                   I made a four-month follow-up.      No med changes made today.  Last LDL in the chart 71 mg% 2024 August.    Today's blood pressure is stable.  He looks comfortable without worsening heart failure symptoms.                 Zafar Rojas MD  03/14/2025   9:33 AM               [1]   Current Outpatient Medications:     ACCU-CHEK SOFTCLIX LANCETS Misc, TEST BLOOD SUGAR THREE TIMES DAILY, Disp: 300 each, Rfl: 3    albuterol (PROVENTIL) 2.5 mg /3  mL (0.083 %) nebulizer solution, Take 3 mLs (2.5 mg total) by nebulization every 6 (six) hours as needed for Wheezing or Shortness of Breath. Rescue, Disp: 60 each, Rfl: 6    alcohol swabs (DROPSAFE ALCOHOL PREP PADS) PadM, Apply 1 each topically once daily., Disp: 500 each, Rfl: 3    allopurinoL (ZYLOPRIM) 100 MG tablet, Take 4 tablets (400 mg total) by mouth once daily., Disp: 360 tablet, Rfl: 4    amiodarone (PACERONE) 200 MG Tab, Take 1 tablet (200 mg total) by mouth once daily., Disp: 30 tablet, Rfl: 0    apixaban (ELIQUIS) 5 mg Tab, Take 1 tablet (5 mg total) by mouth 2 (two) times daily., Disp: 180 tablet, Rfl: 3    aspirin (ECOTRIN) 81 MG EC tablet, Take 81 mg by mouth once daily., Disp: , Rfl:     azelastine (ASTELIN) 137 mcg (0.1 %) nasal spray, 1 spray (137 mcg total) by Nasal route 2 (two) times daily., Disp: 30 mL, Rfl: 1    blood sugar diagnostic (ACCU-CHEK GUIDE TEST STRIPS) Strp, 1 each by Other route 3 (three) times daily. Test Blood Sugar, Disp: 300 strip, Rfl: 10    blood-glucose meter (ACCU-CHEK GUIDE GLUCOSE METER) Misc, Accucheck BID, Disp: 1 each, Rfl: 0    brimonidine 0.2% (ALPHAGAN) 0.2 % Drop, Place 1 drop into both eyes 2 (two) times a day., Disp: 60 mL, Rfl: 3    colchicine (COLCRYS) 0.6 mg tablet, Take 1 tablet (0.6 mg total) by mouth every other day., Disp: 45 tablet, Rfl: 3    ferrous sulfate (IRON) 325 mg (65 mg iron) Tab tablet, Take 1 tablet (325 mg total) by mouth every other day., Disp: 45 tablet, Rfl: 3    gabapentin (NEURONTIN) 100 MG capsule, Take 2 capsules (200 mg total) by mouth every evening., Disp: 60 capsule, Rfl: 11    glimepiride (AMARYL) 4 MG tablet, Take 1 tablet (4 mg total) by mouth daily with breakfast., Disp: , Rfl:     leuprolide acetate, 6 month, (LUPRON) 45 mg SyKt injection, Inject 45 mg into the muscle every 6 (six) months. for 4 doses, Disp: , Rfl:     metoprolol succinate (TOPROL-XL) 25 MG 24 hr tablet, Take 0.5 tablets (12.5 mg total) by mouth once daily.,  Disp: 45 tablet, Rfl: 3    midodrine (PROAMATINE) 10 MG tablet, Take 1 tablet (10 mg total) by mouth 3 (three) times daily with meals., Disp: 90 tablet, Rfl: 11    pantoprazole (PROTONIX) 40 MG tablet, TAKE 1 TABLET ONE TIME DAILY, Disp: 90 tablet, Rfl: 3    pravastatin (PRAVACHOL) 40 MG tablet, Take 1 tablet (40 mg total) by mouth once daily., Disp: 90 tablet, Rfl: 3    predniSONE (DELTASONE) 2.5 MG tablet, Take 2.5 mg by mouth every other day., Disp: , Rfl:     tamsulosin (FLOMAX) 0.4 mg Cap, Take 1 capsule (0.4 mg total) by mouth once daily., Disp: 90 capsule, Rfl: 3    torsemide (DEMADEX) 20 MG Tab, Take 1 tablet (20 mg total) by mouth daily as needed (for swelling or shortness of breath)., Disp: 30 tablet, Rfl: 0    vit A/C/E ac/ZnOx/cupric oxide (EYE VITAMIN AND MINERALS ORAL), Take 1 tablet by mouth every morning., Disp: , Rfl:     Current Facility-Administered Medications:     leuprolide acetate (6 month) injection 45 mg, 45 mg, Intramuscular, Q6 Months, , 45 mg at 02/21/25 0920    Facility-Administered Medications Ordered in Other Visits:     sodium chloride 0.9% in 1,000 mL infusion, , Intravenous, Continuous, Order, Paper

## 2025-03-19 ENCOUNTER — RESULTS FOLLOW-UP (OUTPATIENT)
Dept: INTERNAL MEDICINE | Facility: CLINIC | Age: 75
End: 2025-03-19

## 2025-03-20 ENCOUNTER — INFUSION (OUTPATIENT)
Dept: INFECTIOUS DISEASES | Facility: HOSPITAL | Age: 75
End: 2025-03-20
Payer: MEDICARE

## 2025-03-20 ENCOUNTER — OFFICE VISIT (OUTPATIENT)
Dept: SPORTS MEDICINE | Facility: CLINIC | Age: 75
End: 2025-03-20
Payer: MEDICARE

## 2025-03-20 ENCOUNTER — HOSPITAL ENCOUNTER (OUTPATIENT)
Dept: RADIOLOGY | Facility: HOSPITAL | Age: 75
Discharge: HOME | End: 2025-03-20
Attending: STUDENT IN AN ORGANIZED HEALTH CARE EDUCATION/TRAINING PROGRAM
Payer: MEDICARE

## 2025-03-20 ENCOUNTER — OFFICE VISIT (OUTPATIENT)
Dept: GASTROENTEROLOGY | Facility: CLINIC | Age: 75
End: 2025-03-20
Payer: MEDICARE

## 2025-03-20 VITALS
HEART RATE: 68 BPM | SYSTOLIC BLOOD PRESSURE: 100 MMHG | BODY MASS INDEX: 19.8 KG/M2 | DIASTOLIC BLOOD PRESSURE: 61 MMHG | HEIGHT: 74 IN | WEIGHT: 154.31 LBS

## 2025-03-20 VITALS
OXYGEN SATURATION: 97 % | BODY MASS INDEX: 19.54 KG/M2 | WEIGHT: 152.25 LBS | HEART RATE: 67 BPM | RESPIRATION RATE: 18 BRPM | TEMPERATURE: 98 F | SYSTOLIC BLOOD PRESSURE: 119 MMHG | HEIGHT: 74 IN | DIASTOLIC BLOOD PRESSURE: 63 MMHG

## 2025-03-20 VITALS
WEIGHT: 154.31 LBS | BODY MASS INDEX: 19.8 KG/M2 | SYSTOLIC BLOOD PRESSURE: 100 MMHG | HEART RATE: 68 BPM | DIASTOLIC BLOOD PRESSURE: 61 MMHG | HEIGHT: 74 IN

## 2025-03-20 DIAGNOSIS — M12.811 RIGHT ROTATOR CUFF TEAR ARTHROPATHY: ICD-10-CM

## 2025-03-20 DIAGNOSIS — E87.20 METABOLIC ACIDOSIS WITH NORMAL ANION GAP AND BICARBONATE LOSSES: Primary | ICD-10-CM

## 2025-03-20 DIAGNOSIS — S46.001A ROTATOR CUFF INJURY, RIGHT, INITIAL ENCOUNTER: ICD-10-CM

## 2025-03-20 DIAGNOSIS — S46.011D TRAUMATIC COMPLETE TEAR OF RIGHT ROTATOR CUFF, SUBSEQUENT ENCOUNTER: Primary | ICD-10-CM

## 2025-03-20 DIAGNOSIS — M75.101 RIGHT ROTATOR CUFF TEAR ARTHROPATHY: ICD-10-CM

## 2025-03-20 DIAGNOSIS — Z98.890 STATUS POST REPAIR OF VENTRAL HERNIA: ICD-10-CM

## 2025-03-20 DIAGNOSIS — K86.89 PANCREATIC MASS: ICD-10-CM

## 2025-03-20 DIAGNOSIS — Z87.19 STATUS POST REPAIR OF VENTRAL HERNIA: ICD-10-CM

## 2025-03-20 DIAGNOSIS — K86.9 PANCREATIC LESION: ICD-10-CM

## 2025-03-20 PROBLEM — S46.011A TRAUMATIC COMPLETE TEAR OF RIGHT ROTATOR CUFF: Status: ACTIVE | Noted: 2025-03-20

## 2025-03-20 PROCEDURE — 63600175 PHARM REV CODE 636 W HCPCS: Mod: HCNC | Performed by: INTERNAL MEDICINE

## 2025-03-20 PROCEDURE — 96374 THER/PROPH/DIAG INJ IV PUSH: CPT | Mod: HCNC

## 2025-03-20 PROCEDURE — 99999 PR PBB SHADOW E&M-EST. PATIENT-LVL IV: CPT | Mod: PBBFAC,HCNC,, | Performed by: STUDENT IN AN ORGANIZED HEALTH CARE EDUCATION/TRAINING PROGRAM

## 2025-03-20 PROCEDURE — 99214 OFFICE O/P EST MOD 30 MIN: CPT | Mod: HCNC,S$GLB,,

## 2025-03-20 PROCEDURE — 99999 PR PBB SHADOW E&M-EST. PATIENT-LVL V: CPT | Mod: PBBFAC,HCNC,,

## 2025-03-20 PROCEDURE — 3044F HG A1C LEVEL LT 7.0%: CPT | Mod: HCNC,CPTII,S$GLB,

## 2025-03-20 PROCEDURE — 1126F AMNT PAIN NOTED NONE PRSNT: CPT | Mod: HCNC,CPTII,S$GLB,

## 2025-03-20 PROCEDURE — 3074F SYST BP LT 130 MM HG: CPT | Mod: HCNC,CPTII,S$GLB,

## 2025-03-20 PROCEDURE — 3288F FALL RISK ASSESSMENT DOCD: CPT | Mod: HCNC,CPTII,S$GLB,

## 2025-03-20 PROCEDURE — 73221 MRI JOINT UPR EXTREM W/O DYE: CPT | Mod: 26,RT,, | Performed by: RADIOLOGY

## 2025-03-20 PROCEDURE — 3078F DIAST BP <80 MM HG: CPT | Mod: HCNC,CPTII,S$GLB,

## 2025-03-20 PROCEDURE — 73221 MRI JOINT UPR EXTREM W/O DYE: CPT | Mod: TC,RT

## 2025-03-20 PROCEDURE — 1101F PT FALLS ASSESS-DOCD LE1/YR: CPT | Mod: HCNC,CPTII,S$GLB,

## 2025-03-20 RX ORDER — DIPHENHYDRAMINE HYDROCHLORIDE 50 MG/ML
50 INJECTION, SOLUTION INTRAMUSCULAR; INTRAVENOUS ONCE AS NEEDED
OUTPATIENT
Start: 2025-03-25

## 2025-03-20 RX ORDER — SODIUM CHLORIDE 0.9 % (FLUSH) 0.9 %
10 SYRINGE (ML) INJECTION
OUTPATIENT
Start: 2025-03-25

## 2025-03-20 RX ORDER — EPINEPHRINE 0.3 MG/.3ML
0.3 INJECTION SUBCUTANEOUS ONCE AS NEEDED
OUTPATIENT
Start: 2025-03-25

## 2025-03-20 RX ORDER — HEPARIN 100 UNIT/ML
500 SYRINGE INTRAVENOUS
OUTPATIENT
Start: 2025-03-25

## 2025-03-20 RX ORDER — SODIUM CHLORIDE 0.9 % (FLUSH) 0.9 %
10 SYRINGE (ML) INJECTION
Status: DISCONTINUED | OUTPATIENT
Start: 2025-03-20 | End: 2025-03-20 | Stop reason: HOSPADM

## 2025-03-20 RX ADMIN — IRON SUCROSE 100 MG: 20 INJECTION, SOLUTION INTRAVENOUS at 09:03

## 2025-03-20 NOTE — PROGRESS NOTES
Subjective:          Chief Complaint: Jarrett Charlton Jr. is a 74 y.o. male who had concerns including Pain of the Right Shoulder.    HPI 03/20/2025   History of Present Illness    CHIEF COMPLAINT:  - Follow-up evaluation of shoulder pain and discussion of treatment options.    HPI:  Client presents for evaluation of a shoulder injury. An MRI revealed a large, retracted rotator cuff tear measuring about 38 mm, affecting multiple tendons including the one that allows arm twisting. The injury appears chronic with possible acute exacerbation, evidenced by fatty infiltration within the muscle tissue. He reports pain with shoulder movement. X-rays taken approximately two weeks ago showed minimal arthritis but indicated a high-riding humeral head due to the rotator cuff tears. His main concern is being able to use his hand, particularly for managing his ileostomy. He expresses a desire to have it repaired as soon as possible.    Client has a history of heart issues and is currently scheduled for a scope procedure due to a pancreatic lesion, which has raised concerns about anesthesia use. He sees a cardiologist at [facility] and a nephrologist, though he is not on dialysis. Recent medication given at urgent care temporarily elevated his kidney functions.    WORK STATUS:  - Client mentions needing to get time off work for the upcoming shoulder surgery.     HPI 3/13/2025  History of Present Illness    CHIEF COMPLAINT:  - Right shoulder pain and limited range of motion    HPI:  Client presents with right shoulder pain following a fall approximately three months ago. He reports the incident occurred in his bedroom while straightening his bed. His foot got caught under the headboard, causing him to fall backwards, hitting his head on a dresser and landing on his right arm. He describes difficulty lifting his right arm, stating he cannot lift it up unless he does it in a specific manner. His right arm lacks strength, and he  frequently drops items. He hears a popping sound when moving the arm, particularly when others attempt to examine it. He reports new-onset numbness in a few fingers on his right hand, which he states is not as severe as a finger on the left hand. He is right-handed and is no longer working. He denies any tearing or popping sound at the time of the fall but reports these symptoms have developed since.    Client denies having a pacemaker or defibrillator.    WORK STATUS:  - Client is not currently working.        Past Medical History:   Diagnosis Date    Autonomic dysfunction 11/2023 11/2023 neuro note    Basal cell carcinoma     BPH (benign prostatic hyperplasia)     s/p TURP    CAD (coronary artery disease) 2017    status post PCI to mid LAD in 2017 (LHC in 2021 showed patent stent, otherwise nonobstructive CAD)    Cancer of prostate     s/p TURP    Chronic kidney disease     DDD (degenerative disc disease) 10/21/2013    Diabetes mellitus with renal complications     Disc disease, degenerative, cervical     DVT (deep venous thrombosis)     Encounter for blood transfusion     GERD (gastroesophageal reflux disease)     History of ulcerative colitis 1982    s/p colectomy and ileostomy    HLD (hyperlipidemia)     Ileostomy in place 1982    RBBB     Squamous cell carcinoma 03/08/2018    Left superior helix near insertion    Squamous cell carcinoma 04/12/2018    Left forearm x 5    Ventricular tachycardia        Medications Ordered Prior to Encounter[1]    Past Surgical History:   Procedure Laterality Date    ABLATION, SVT, ACCESSORY PATHWAY N/A 4/30/2024    Procedure: Ablation, SVT, Accessory Pathway;  Surgeon: Franky Taylor MD;  Location: [facility];  Service: Cardiology;  Laterality: N/A;  VT, RFA, Carto, MAC, GP, 3 Prep *MDT ILR in situ*    cardiac stents      CATARACT EXTRACTION Bilateral     CERVICAL FUSION      CHOLECYSTECTOMY N/A 2/14/2023    Procedure: CHOLECYSTECTOMY;  Surgeon: Mike Joyce MD;   Location: [facility] OR;  Service: General;  Laterality: N/A;  very high probabilty of converison to open    colectomy and ileostomy  1985    ENDOSCOPIC ULTRASOUND OF UPPER GASTROINTESTINAL TRACT N/A 2/10/2023    Procedure: ULTRASOUND, UPPER GI TRACT, ENDOSCOPIC;  Surgeon: Poli Fuller MD;  Location: [facility] ENDO;  Service: Endoscopy;  Laterality: N/A;    ERCP N/A 2/10/2023    Procedure: ERCP (ENDOSCOPIC RETROGRADE CHOLANGIOPANCREATOGRAPHY);  Surgeon: Poli Fuller MD;  Location: [facility] ENDO;  Service: Endoscopy;  Laterality: N/A;    EXCISION OF PAROTID GLAND Left 12/18/2020    Procedure: EXCISION, PAROTID GLAND;  Surgeon: Michael Pinzon MD;  Location: [facility] OR 2ND FLR;  Service: ENT;  Laterality: Left;    INSERTION OF IMPLANTABLE LOOP RECORDER  06/07/2021    INSERTION OF IMPLANTABLE LOOP RECORDER Left 6/7/2021    Procedure: INSERTION, IMPLANTABLE LOOP RECORDER;  Surgeon: Romario Dao MD;  Location: [facility] CATH LAB;  Service: Cardiology;  Laterality: Left;    LEFT HEART CATHETERIZATION Right 4/15/2021    Procedure: CATHETERIZATION, HEART, LEFT;  Surgeon: Marcio Jones MD;  Location: [facility] CATH LAB;  Service: Cardiology;  Laterality: Right;    LYSIS OF ADHESIONS N/A 11/9/2020    Procedure: LYSIS, ADHESIONS,  ERAS low;  Surgeon: CED Haley MD;  Location: [facility] OR 2ND FLR;  Service: Colon and Rectal;  Laterality: N/A;    LYSIS OF ADHESIONS N/A 2/14/2023    Procedure: LYSIS, ADHESIONS;  Surgeon: Mike Joyce MD;  Location: [facility] OR;  Service: General;  Laterality: N/A;    POUCHOSCOPY N/A 4/6/2022    Procedure: ENDOSCOPY, POUCH, SMALL INTESTINE, DIAGNOSTIC;  Surgeon: Dieter Juarez MD;  Location: [facility] ENDO (2ND FLR);  Service: Endoscopy;  Laterality: N/A;    REPAIR, HERNIA, PARASTOMAL N/A 11/9/2020    Procedure: REPAIR, HERNIA, PARASTOMAL;  Surgeon: ECD Haley MD;  Location: [facility] OR 2ND FLR;  Service: Colon and Rectal;  Laterality: N/A;    TRANSURETHRAL RESECTION OF PROSTATE (TURP) WITHOUT USE  OF LASER N/A 1/23/2019    Procedure: TURP, WITHOUT USING LASER BIPOLAR;  Surgeon: Catarino Mota MD;  Location: [facility] OR [address] [place] [address];  Service: Urology;  Laterality: N/A;  1.5 HOURS    TREATMENT OF CARDIAC ARRHYTHMIA  4/30/2024    Procedure: Cardioversion or Defibrillation;  Surgeon: Franky Taylor MD;  Location: [facility] EP LAB;  Service: Cardiology;;       Family History   Problem Relation Name Age of Onset    Dementia Mother      Cancer Father      Diabetes Father      Heart disease Father      Melanoma Neg Hx      Hypertension Neg Hx      Arthritis Neg Hx         Social History[2]   Review of Systems   Constitutional: Negative.   HENT: Negative.     Eyes: Negative.    Cardiovascular: Negative.    Respiratory: Negative.     Endocrine: Negative.    Hematologic/Lymphatic: Negative.    Skin: Negative.    Musculoskeletal:  Positive for joint pain and muscle weakness.   Neurological: Negative.    Psychiatric/Behavioral: Negative.     Allergic/Immunologic: Negative.        Pain Related Questions  Over the past 3 days, what was your average pain during activity? (I.e. running, jogging, walking, climbing stairs, getting dressed, ect.): 0  Over the past 3 days, what was your highest pain level?: 0  Over the past 3 days, what was your lowest pain level? : 0           Objective:        General: Jarrett is well-developed, well-nourished, appears stated age, in no acute distress, alert and oriented to time, place and person.     General    Nursing note and vitals reviewed.  Constitutional: He is oriented to person, place, and time. He appears well-developed and well-nourished. No distress.   HENT:   Head: Normocephalic and atraumatic.   Nose: Nose normal.   Eyes: EOM are normal.   Cardiovascular:  Intact distal pulses.            Pulmonary/Chest: Effort normal. No respiratory distress.   Neurological: He is alert and oriented to person, place, and time.   Psychiatric: He has a normal mood and affect. His behavior is normal. Judgment  and thought content normal.         Right Shoulder Exam     Inspection/Observation   Swelling: absent  Bruising: absent  Scars: absent  Deformity: absent  Scapular Winging: absent  Scapular Dyskinesia: negative  Atrophy: absent    Tenderness   The patient is tender to palpation of the greater tuberosity and biceps tendon.    Range of Motion   Active abduction:  10   Passive abduction:  110   Forward Flexion:  30   Forward Elevation: 30  External Rotation 0 degrees:  10   Internal rotation 0 degrees:  Sacrum     Tests & Signs   Drop arm: positive  Lag Sign 0 degrees: positive  Lift Off Sign: positive  Belly Press: positive  Bear Hug: positive    Other   Sensation: normal    Left Shoulder Exam   Left shoulder exam is normal.    Inspection/Observation   Swelling: absent  Bruising: absent  Scars: absent  Deformity: absent  Scapular Winging: absent  Scapular Dyskinesia: negative  Atrophy: absent    Tenderness   The patient is experiencing no tenderness.     Range of Motion   Active abduction:  170   Passive abduction:  170   Forward Flexion:  180   Forward Elevation: 180  External Rotation 0 degrees:  60   Internal rotation 0 degrees:  Mid thoracic     Other   Sensation: normal       Muscle Strength   Right Upper Extremity   Shoulder Abduction: 2/5   Shoulder Internal Rotation: 2/5   Shoulder External Rotation: 2/5   Supraspinatus: 2/5   Subscapularis: 2/5   Biceps: 5/5   Left Upper Extremity  Shoulder Abduction: 5/5   Shoulder Internal Rotation: 5/5   Shoulder External Rotation: 5/5   Supraspinatus: 5/5   Subscapularis: 5/5   Biceps: 5/5     Vascular Exam     Right Pulses      Radial:                    2+      Left Pulses      Radial:                    2+      Capillary Refill  Right Hand: normal capillary refill  Left Hand: normal capillary refill            Imaging:   X-rays of the right shoulder from 03/13/2025 personally viewed by me on that day these include AP, Grashey, scapular Y, axillary lateral.  No  fracture.  Mild superior migration of the humeral head with a acromial humeral distance a 5.5 mm.    MRI of the right shoulder from 03/20/2025 personally viewed by me 03/20/2025.  There was a full-thickness tear of the supra and infraspinatus with retraction of near leave 40 mm to the level of the glenoid.  There was grade 4 fatty infiltration of the supraspinatus and grade 3 of the infraspinatus.  The subscapularis tearing of the upper border.  Joint effusion with synovitis.  Moderate arthritic changes.      Assessment:   Jarrett Charlton Jr. is a 74 y.o. male with right full-thickness rotator cuff tear and rotator cuff tear arthropathy.  1. Traumatic complete tear of right rotator cuff, subsequent encounter        2. Right rotator cuff tear arthropathy             Plan:       Assessment & Plan    PROCEDURES:  - Client understands the risks and benefits and elects to proceed with reverse shoulder replacement surgery.  - Surgery takes about 1.5 hours.  - Post-op care includes wearing a sling for 6 weeks and starting PT 2 weeks after surgery.  - Preoperative clearance needed from PCP and cardiologist due to patient's medical history.  - Surgery may be performed at the [facility] due to patient's cardiac history.    FOLLOW UP:  - Follow up after obtaining clearances from PCP and cardiologist.  - Contact office when clearances are obtained to schedule surgery date.    PATIENT INSTRUCTIONS:  - Use sling 24/7 for 6 weeks after surgery, including while sleeping.         The diagnosis treatment options were discussed with the patient and all his questions were answered I showed him the x-rays and the MRI and reviewed the findings with the.  Unfortunately, he still has significant pain in his shoulder.  We discussed continued nonoperative management with injections and physical therapy.  The alternative would be operative intervention.  We discussed arthroscopic rotator cuff repair, however I do not think he would  have satisfactory results given his superior migration of the humeral head, large amount of tendon retraction, and fatty infiltration of the supraspinatus.  We then discussed reverse shoulder arthroplasty.  I explained this would likely be more predictable in recovery and when give him a better outcome.  The differences of the procedure, risks, benefits, alternatives, rehabilitation were discussed at length and all their questions were answered after discussion, he would like to proceed with reverse shoulder arthroplasty.  He will obtain clearance from his primary care physician and cardiologist.  His surgery will be done at [facility] in the he would likely stay 1 night overnight.  We will obtain a CT scan for preoperative planning.  We will contact him with the potential surgical date.    This note was generated with the assistance of ambient listening technology. Verbal consent was obtained by the patient and accompanying visitor(s) for the recording of patient appointment to facilitate this note. I attest to having reviewed and edited the generated note for accuracy, though some syntax or spelling errors may persist. Please contact the author of this note for any clarification.                           [1]   Current Outpatient Medications on File Prior to Visit   Medication Sig Dispense Refill    ACCU-CHEK SOFTCLIX LANCETS Misc TEST BLOOD SUGAR THREE TIMES DAILY 300 each 3    albuterol (PROVENTIL) 2.5 mg /3 mL (0.083 %) nebulizer solution Take 3 mLs (2.5 mg total) by nebulization every 6 (six) hours as needed for Wheezing or Shortness of Breath. Rescue 60 each 6    alcohol swabs (DROPSAFE ALCOHOL PREP PADS) PadM Apply 1 each topically once daily. 500 each 3    allopurinoL (ZYLOPRIM) 100 MG tablet Take 4 tablets (400 mg total) by mouth once daily. 360 tablet 4    apixaban (ELIQUIS) 5 mg Tab Take 1 tablet (5 mg total) by mouth 2 (two) times daily. 180 tablet 3    aspirin (ECOTRIN) 81 MG EC tablet Take 81 mg by  mouth once daily.      azelastine (ASTELIN) 137 mcg (0.1 %) nasal spray 1 spray (137 mcg total) by Nasal route 2 (two) times daily. 30 mL 1    blood sugar diagnostic (ACCU-CHEK GUIDE TEST STRIPS) Strp 1 each by Other route 3 (three) times daily. Test Blood Sugar 300 strip 10    blood-glucose meter (ACCU-CHEK GUIDE GLUCOSE METER) [facility] Accucheck BID 1 each 0    brimonidine 0.2% (ALPHAGAN) 0.2 % Drop Place 1 drop into both eyes 2 (two) times a day. 60 mL 3    colchicine (COLCRYS) 0.6 mg tablet Take 1 tablet (0.6 mg total) by mouth every other day. 45 tablet 3    ferrous sulfate (IRON) 325 mg (65 mg iron) Tab tablet Take 1 tablet (325 mg total) by mouth every other day. 45 tablet 3    gabapentin (NEURONTIN) 100 MG capsule Take 2 capsules (200 mg total) by mouth every evening. 60 capsule 11    glimepiride (AMARYL) 4 MG tablet Take 1 tablet (4 mg total) by mouth daily with breakfast.      leuprolide acetate, 6 month, (LUPRON) 45 mg SyKt injection Inject 45 mg into the muscle every 6 (six) months. for 4 doses      metoprolol succinate (TOPROL-XL) 25 MG 24 hr tablet Take 0.5 tablets (12.5 mg total) by mouth once daily. 45 tablet 3    midodrine (PROAMATINE) 10 MG tablet Take 1 tablet (10 mg total) by mouth 3 (three) times daily with meals. 90 tablet 11    pantoprazole (PROTONIX) 40 MG tablet TAKE 1 TABLET ONE TIME DAILY 90 tablet 3    pravastatin (PRAVACHOL) 40 MG tablet Take 1 tablet (40 mg total) by mouth once daily. 90 tablet 3    predniSONE (DELTASONE) 2.5 MG tablet Take 2.5 mg by mouth every other day.      tamsulosin (FLOMAX) 0.4 mg Cap Take 1 capsule (0.4 mg total) by mouth once daily. 90 capsule 3    torsemide (DEMADEX) 20 MG Tab Take 1 tablet (20 mg total) by mouth daily as needed (for swelling or shortness of breath). 90 tablet 0    torsemide (DEMADEX) 20 MG Tab Take 1 tablet (20 mg total) by mouth once daily. 30 tablet 0    vit A/C/E ac/ZnOx/cupric oxide (EYE VITAMIN AND MINERALS ORAL) Take 1 tablet by mouth every  morning.      amiodarone (PACERONE) 200 MG Tab Take 1 tablet (200 mg total) by mouth once daily. 30 tablet 0     Current Facility-Administered Medications on File Prior to Visit   Medication Dose Route Frequency Provider Last Rate Last Admin    [COMPLETED] iron sucrose injection 100 mg  100 mg Intravenous 1 time in Clinic/HOD Neftaly Isabel MD   100 mg at 03/20/25 0953    leuprolide acetate (6 month) injection 45 mg  45 mg Intramuscular Q6 Months    45 mg at 02/21/25 0955    sodium chloride 0.9% in 1,000 mL infusion   Intravenous Continuous Order, Paper        [DISCONTINUED] 0.9% NaCl 250 mL flush bag   Intravenous PRN Neftaly Isabel MD        [DISCONTINUED] sodium chloride 0.9% flush 10 mL  10 mL Intravenous PRN Neftaly Isabel MD       [2]   Social History  Socioeconomic History    Marital status:     Number of children: 1   Occupational History    Occupation:  x 44 years     Comment: Retired    Occupation: [place]    Tobacco Use    Smoking status: Never     Passive exposure: Never    Smokeless tobacco: Never   Substance and Sexual Activity    Alcohol use: No    Drug use: No    Sexual activity: Not Currently     Partners: Female     Social Drivers of Health     Financial Resource Strain: Low Risk  (2/27/2025)    Overall Financial Resource Strain (CARDIA)     Difficulty of Paying Living Expenses: Not very hard   Food Insecurity: No Food Insecurity (2/27/2025)    Hunger Vital Sign     Worried About Running Out of Food in the Last Year: Never true     Ran Out of Food in the Last Year: Never true   Transportation Needs: No Transportation Needs (2/25/2025)    PRAPARE - Transportation     Lack of Transportation (Medical): No     Lack of Transportation (Non-Medical): No   Physical Activity: Insufficiently Active (2/25/2025)    Exercise Vital Sign     Days of Exercise per Week: 3 days     Minutes of Exercise per Session: 30 min   Stress: Stress Concern Present (2/27/2025)     [facility] of Occupational Health - Occupational Stress Questionnaire     Feeling of Stress : To some extent   Housing Stability: Low Risk  (2/27/2025)    Housing Stability Vital Sign     Unable to Pay for Housing in the Last Year: No     Homeless in the Last Year: No

## 2025-03-20 NOTE — PROGRESS NOTES
Patient received dose 6/10 of Venofer 100 mg, given slow IVP over 5 minutes. Patient observed 30 minutes post infusion. Patient tolerated well.    Next appointment scheduled and patient made aware.    Limited head-to-toe assessment due to privacy issues and visit reason though the opportunity was given for patient to express any concerns.

## 2025-03-20 NOTE — PROGRESS NOTES
Gastroenterology: [facility]      SUBJECTIVE:         Chief Complaint: Here for evaluation of a pancreatic cyst     History of Present Illness:  Patient is a 75 y.o. male with pmhx stage 4 CKD, CAD s/p PCI to mid-LAD in 2017 , aortic aneurysm, pulmonary hypertension, CHF (EF 30-35%), hx of DVT (on eliquis), afib, T2DM, Crohn's disease s/p ileostomy, s/p open cholecystectomy (2023) presents with a pancreatic cyst. The cyst was first noted on CT AP w/o contrast 1/18/25 which demonstrated a 1.4cm hypodense lesion in the pancreatic head. Subsequent MRI without contrast 1/24/25 showed multilobular cystic lesion in the pancreatic head measuring approximately 2.0cm, suboptimally characterized. It noted additional tiny cystic lesions along the pancreatic duct in the body, likely branch duct IPMN. No pancreatic ductal dilation. It was recommended he have contrast enhanced MRI but this in not an option d/t poor kidney function. Subsequent CT w/o contrast 2/3/25 with subtle low attenuating lesion at the pancreatic head measuring 1.1cm, stable. Noted fatty atrophy of the pancreas without pancreatic ductal dilation. It's located in the the head.  It measures 20 mm in size on the most recent imaging.  It is not symptomatic. Previous studies include CT scan and MRI.   Since initial detection, the cyst has not increased in size. The pancreatic duct is not dilated. Previous FNA of the cyst has not been done    Of note, patient underwent previous EUS in 2023 for biliary dilation seen on imaging which demonstrated biliary dilation with no obstruction identified and showed no pancreatic abnormalities. He subsequently underwent open cholecystectomy in 2023.     He endorses some abdominal bloating but otherwise asymptomatic from a GI standpoint. He does endorse about 60 lbs of weight loss in the last 6 months but attributes this to recent hospitalizations in February (CHF exacerbation) and march ([facility]). Denies  abdominal pain, jaundice, fever, NV, SOB, chest pain, LE edema. Denies a family history of pancreatic cancer. Denies hx of pancreatitis. Has a history of diabetes with recent A1C 6.6. Patient does not smoke or drink alcohol. He takes eliquis and ASA 81mg     Given his extensive cardiac history, I spoke with his cardiologist Dr. Zafar Rojas who said he would be cleared for endoscopy.         Prior Imaging:  As per HPI      Personal/family factors:    There is not a family history of pancreatic cancer     The patient does not smoke.    ECOG status 0 - Asymptomatic        Review of Systems   Constitutional: no fever, chills or change in weight   Eyes: no visual changes   ENT: no sore throat or dysphagia  Respiratory: no cough or shortness of breath   Cardiovascular: no chest pain or palpitations   Gastrointestinal: as per HPI  Hematologic/Lymphatic: no easy bruising or lymphadenopathy   Musculoskeletal: no arthralgias or myalgias   Neurological: no seizures, tremors or change in mental status  Behavioral/Psych: no auditory or visual hallucinations    Past Medical History:   Diagnosis Date    Autonomic dysfunction 11/2023 11/2023 neuro note    Basal cell carcinoma     BPH (benign prostatic hyperplasia)     s/p TURP    CAD (coronary artery disease) 2017    status post PCI to mid LAD in 2017 (LHC in 2021 showed patent stent, otherwise nonobstructive CAD)    Cancer of prostate     s/p TURP    Chronic kidney disease     DDD (degenerative disc disease) 10/21/2013    Diabetes mellitus with renal complications     Disc disease, degenerative, cervical     DVT (deep venous thrombosis)     Encounter for blood transfusion     GERD (gastroesophageal reflux disease)     History of ulcerative colitis 1982    s/p colectomy and ileostomy    HLD (hyperlipidemia)     Ileostomy in place 1982    RBBB     Squamous cell carcinoma 03/08/2018    Left superior helix near insertion    Squamous cell carcinoma 04/12/2018    Left forearm x 5     Ventricular tachycardia        Past Surgical History:   Procedure Laterality Date    ABLATION, SVT, ACCESSORY PATHWAY N/A 4/30/2024    Procedure: Ablation, SVT, Accessory Pathway;  Surgeon: Franky Taylor MD;  Location: [facility] EP LAB;  Service: Cardiology;  Laterality: N/A;  VT, RFA, Carto, MAC, GP, 3 Prep *MDT ILR in situ*    cardiac stents      CATARACT EXTRACTION Bilateral     CERVICAL FUSION      CHOLECYSTECTOMY N/A 2/14/2023    Procedure: CHOLECYSTECTOMY;  Surgeon: Mike Joyce MD;  Location: [facility] OR;  Service: General;  Laterality: N/A;  very high probabilty of converison to open    colectomy and ileostomy  1985    ENDOSCOPIC ULTRASOUND OF UPPER GASTROINTESTINAL TRACT N/A 2/10/2023    Procedure: ULTRASOUND, UPPER GI TRACT, ENDOSCOPIC;  Surgeon: Poli Fuller MD;  Location: [facility] ENDO;  Service: Endoscopy;  Laterality: N/A;    ERCP N/A 2/10/2023    Procedure: ERCP (ENDOSCOPIC RETROGRADE CHOLANGIOPANCREATOGRAPHY);  Surgeon: Poli Fuller MD;  Location: [facility] ENDO;  Service: Endoscopy;  Laterality: N/A;    EXCISION OF PAROTID GLAND Left 12/18/2020    Procedure: EXCISION, PAROTID GLAND;  Surgeon: Michael Pinzon MD;  [address]  Service: ENT;  Laterality: Left;    INSERTION OF IMPLANTABLE LOOP RECORDER  06/07/2021    INSERTION OF IMPLANTABLE LOOP RECORDER Left 6/7/2021    Procedure: INSERTION, IMPLANTABLE LOOP RECORDER;  Surgeon: Romario Dao MD;  Location: [facility] CATH LAB;  Service: Cardiology;  Laterality: Left;    LEFT HEART CATHETERIZATION Right 4/15/2021    Procedure: CATHETERIZATION, HEART, LEFT;  Surgeon: Marcio Jones MD;  Location: [facility] CATH LAB;  Service: Cardiology;  Laterality: Right;    LYSIS OF ADHESIONS N/A 11/9/2020    Procedure: LYSIS, ADHESIONS,  ERAS low;  Surgeon: CED Haley MD;  Location: [facility] 2ND FLR;  Service: Colon and Rectal;  Laterality: N/A;    LYSIS OF ADHESIONS N/A 2/14/2023    Procedure: LYSIS, ADHESIONS;  Surgeon: Mike Joyce MD;   Location: [facility] OR;  Service: General;  Laterality: N/A;    POUCHOSCOPY N/A 4/6/2022    Procedure: ENDOSCOPY, POUCH, SMALL INTESTINE, DIAGNOSTIC;  Surgeon: Dieter Juarez MD;  Location: [facility] ENDO (2ND FLR);  Service: Endoscopy;  Laterality: N/A;    REPAIR, HERNIA, PARASTOMAL N/A 11/9/2020    Procedure: REPAIR, HERNIA, PARASTOMAL;  Surgeon: CED Haley MD;  Location: [facility] OR 2ND FLR;  Service: Colon and Rectal;  Laterality: N/A;    TRANSURETHRAL RESECTION OF PROSTATE (TURP) WITHOUT USE OF LASER N/A 1/23/2019    Procedure: TURP, WITHOUT USING LASER BIPOLAR;  Surgeon: Catarino Mota MD;  Location: [facility] OR 1ST FLR;  Service: Urology;  Laterality: N/A;  1.5 HOURS    TREATMENT OF CARDIAC ARRHYTHMIA  4/30/2024    Procedure: Cardioversion or Defibrillation;  Surgeon: Franky Taylor MD;  Location: [facility] EP LAB;  Service: Cardiology;;       Family History   Problem Relation Name Age of Onset    Dementia Mother      Cancer Father      Diabetes Father      Heart disease Father      Melanoma Neg Hx      Hypertension Neg Hx      Arthritis Neg Hx         Social History[1]    Medications Ordered Prior to Encounter[2]    Review of patient's allergies indicates:   Allergen Reactions    Atorvastatin     Rosuvastatin        Physical Exam:  General: Well-developed, well-appearing, no acute distress  Neuro: alert and oriented to person, place, time; normal appearing gait  Eyes: No scleral icterus, pupils equally round, normal-appearing conjunctiva  Neck: supple; no cervical lymphadenopathy  CVS: regular rate and rhythm; no murmurs  Lungs: clear to auscultation bilaterally; no labored breathing, no wheezes  Abdomen: soft, non-distended, non-tender, no rebound tenderness or guarding, nomal bowel sounds  Extremities: no cyanosis, edema, or clubbing  Skin: no rash, no jaundice        Laboratory:   Lab Results   Component Value Date    ALT 43 03/04/2025    AST 34 03/04/2025    ALKPHOS 129 03/04/2025    BILITOT 0.8 03/04/2025      Lab Results   Component Value Date    WBC 3.07 (L) 03/04/2025    HGB 9.3 (L) 03/04/2025    HCT 30.0 (L) 03/04/2025    MCV 89 03/04/2025    PLT 76 (L) 03/04/2025     Lab Results   Component Value Date    INR 1.1 03/01/2025    INR 1.5 (H) 02/08/2025    INR 1.4 (H) 01/24/2025           Diagnostic/Imaging Results:  As above    ASSESSMENT/PLAN:     Pancreatic cyst in the the head. Measuring 20 mm in size,  unchanged  Most likely etiology at this point is a  undetermined cystic lesion    We discussed in detail the natural history of pancreatic cysts, the therapy involved, and the benefits of multimodality surveillance imaging including Ct, MRI, and EUS with FNA    1. Pancreatic lesion    2. Pancreatic mass        Plan:      Pancreas cyst    We discussed the options for further investigation/surveillance of this lesion including EUS vs continued monitoring with non invasive imaging with MRCP. He has stage 4 CKD making contrasted imaging not a viable option. All imaging thus far has been without contrast and cyst has been suboptimally characterized thus far. Because of this, I would typically recommend an EUS to better characterize cyst and perform FNA. The patient would prefer to have an EUS to more definitively assess the lesion on his pancreas. Due to his significant cardiac history, I reached out to his cardiologist Dr. Zafar Rojas who said he would be cleared for endoscopy. The patient understands that he is at increased risk for adverse outcome from anesthesia/endoscopy  I will arrange for an EUS in the next 4-6 weeks to further characterize pancreatic cyst, rule out solid mass lesion, and perform FNA if possible  Future surveillance recommendations will be made pending findings of EUS and FNA results   Explained the EUS procedure in detail and answered the patients questions regarding this procedure  Went over the risks of the procedure including but not limited to risks of anesthesia, bleeding, perforation,  infection, pancreatitis- he verbalized understanding of these risks and would like to proceed with EUS              Momo Hallman PA-C  Department of Advanced Endoscopy  [facility]         [1]   Social History  Socioeconomic History    Marital status:     Number of children: 1   Occupational History    Occupation:  x 44 years     Comment: Retired    Occupation: [place]    Tobacco Use    Smoking status: Never     Passive exposure: Never    Smokeless tobacco: Never   Substance and Sexual Activity    Alcohol use: No    Drug use: No    Sexual activity: Not Currently     Partners: Female     Social Drivers of Health     Financial Resource Strain: Low Risk  (2/27/2025)    Overall Financial Resource Strain (CARDIA)     Difficulty of Paying Living Expenses: Not very hard   Food Insecurity: No Food Insecurity (2/27/2025)    Hunger Vital Sign     Worried About Running Out of Food in the Last Year: Never true     Ran Out of Food in the Last Year: Never true   Transportation Needs: No Transportation Needs (2/25/2025)    PRAPARE - Transportation     Lack of Transportation (Medical): No     Lack of Transportation (Non-Medical): No   Physical Activity: Insufficiently Active (2/25/2025)    Exercise Vital Sign     Days of Exercise per Week: 3 days     Minutes of Exercise per Session: 30 min   Stress: Stress Concern Present (2/27/2025)    [employer] [place] [employer] - Occupational Stress Questionnaire     Feeling of Stress : To some extent   Housing Stability: Low Risk  (2/27/2025)    Housing Stability Vital Sign     Unable to Pay for Housing in the Last Year: No     Homeless in the Last Year: No   [2]   Current Outpatient Medications on File Prior to Visit   Medication Sig Dispense Refill    ACCU-CHEK SOFTCLIX LANCETS Misc TEST BLOOD SUGAR THREE TIMES DAILY 300 each 3    albuterol (PROVENTIL) 2.5 mg /3 mL (0.083 %) nebulizer solution Take 3 mLs (2.5 mg total) by nebulization every  6 (six) hours as needed for Wheezing or Shortness of Breath. Rescue 60 each 6    alcohol swabs (DROPSAFE ALCOHOL PREP PADS) PadM Apply 1 each topically once daily. 500 each 3    allopurinoL (ZYLOPRIM) 100 MG tablet Take 4 tablets (400 mg total) by mouth once daily. 360 tablet 4    apixaban (ELIQUIS) 5 mg Tab Take 1 tablet (5 mg total) by mouth 2 (two) times daily. 180 tablet 3    aspirin (ECOTRIN) 81 MG EC tablet Take 81 mg by mouth once daily.      azelastine (ASTELIN) 137 mcg (0.1 %) nasal spray 1 spray (137 mcg total) by Nasal route 2 (two) times daily. 30 mL 1    blood sugar diagnostic (ACCU-CHEK GUIDE TEST STRIPS) Strp 1 each by Other route 3 (three) times daily. Test Blood Sugar 300 strip 10    blood-glucose meter (ACCU-CHEK GUIDE GLUCOSE METER) [facility] [place]uc[place]ck BID 1 each 0    brimonidine 0.2% (ALPHAGAN) 0.2 % Drop Place 1 drop into both eyes 2 (two) times a day. 60 mL 3    colchicine (COLCRYS) 0.6 mg tablet Take 1 tablet (0.6 mg total) by mouth every other day. 45 tablet 3    ferrous sulfate (IRON) 325 mg (65 mg iron) Tab tablet Take 1 tablet (325 mg total) by mouth every other day. 45 tablet 3    gabapentin (NEURONTIN) 100 MG capsule Take 2 capsules (200 mg total) by mouth every evening. 60 capsule 11    glimepiride (AMARYL) 4 MG tablet Take 1 tablet (4 mg total) by mouth daily with breakfast.      leuprolide acetate, 6 month, (LUPRON) 45 mg SyKt injection Inject 45 mg into the muscle every 6 (six) months. for 4 doses      metoprolol succinate (TOPROL-XL) 25 MG 24 hr tablet Take 0.5 tablets (12.5 mg total) by mouth once daily. 45 tablet 3    midodrine (PROAMATINE) 10 MG tablet Take 1 tablet (10 mg total) by mouth 3 (three) times daily with meals. 90 tablet 11    pantoprazole (PROTONIX) 40 MG tablet TAKE 1 TABLET ONE TIME DAILY 90 tablet 3    pravastatin (PRAVACHOL) 40 MG tablet Take 1 tablet (40 mg total) by mouth once daily. 90 tablet 3    predniSONE (DELTASONE) 2.5 MG tablet Take 2.5 mg by mouth every other  day.      tamsulosin (FLOMAX) 0.4 mg Cap Take 1 capsule (0.4 mg total) by mouth once daily. 90 capsule 3    torsemide (DEMADEX) 20 MG Tab Take 1 tablet (20 mg total) by mouth daily as needed (for swelling or shortness of breath). 90 tablet 0    torsemide (DEMADEX) 20 MG Tab Take 1 tablet (20 mg total) by mouth once daily. 30 tablet 0    vit A/C/E ac/ZnOx/cupric oxide (EYE VITAMIN AND MINERALS ORAL) Take 1 tablet by mouth every morning.      amiodarone (PACERONE) 200 MG Tab Take 1 tablet (200 mg total) by mouth once daily. 30 tablet 0     Current Facility-Administered Medications on File Prior to Visit   Medication Dose Route Frequency Provider Last Rate Last Admin    leuprolide acetate (6 month) injection 45 mg  45 mg Intramuscular Q6 Months    45 mg at 02/21/25 0955    sodium chloride 0.9% in 1,000 mL infusion   Intravenous Continuous Order, Paper

## 2025-03-21 ENCOUNTER — PATIENT MESSAGE (OUTPATIENT)
Dept: INTERNAL MEDICINE | Facility: CLINIC | Age: 75
End: 2025-03-21
Payer: MEDICARE

## 2025-03-24 ENCOUNTER — PATIENT MESSAGE (OUTPATIENT)
Dept: CARDIOLOGY | Facility: CLINIC | Age: 75
End: 2025-03-24
Payer: MEDICARE

## 2025-03-24 NOTE — TELEPHONE ENCOUNTER
No care due was identified.  [facility] Embedded Care Due Messages. Reference number: [id number].   3/24/2025 10:12:56 AM CDT

## 2025-03-25 RX ORDER — MECLIZINE HYDROCHLORIDE 25 MG/1
25 TABLET ORAL 3 TIMES DAILY PRN
Qty: 60 TABLET | Refills: 3 | Status: ON HOLD | OUTPATIENT
Start: 2025-03-25 | End: 2025-06-23

## 2025-03-27 ENCOUNTER — TELEPHONE (OUTPATIENT)
Dept: CARDIOLOGY | Facility: CLINIC | Age: 75
End: 2025-03-27
Payer: MEDICARE

## 2025-03-27 ENCOUNTER — PATIENT MESSAGE (OUTPATIENT)
Dept: SPORTS MEDICINE | Facility: CLINIC | Age: 75
End: 2025-03-27
Payer: MEDICARE

## 2025-03-27 ENCOUNTER — INFUSION (OUTPATIENT)
Dept: INFECTIOUS DISEASES | Facility: HOSPITAL | Age: 75
End: 2025-03-27
Payer: MEDICARE

## 2025-03-27 ENCOUNTER — OFFICE VISIT (OUTPATIENT)
Dept: UROLOGY | Facility: CLINIC | Age: 75
End: 2025-03-27
Payer: MEDICARE

## 2025-03-27 VITALS
HEART RATE: 86 BPM | WEIGHT: 154 LBS | BODY MASS INDEX: 19.76 KG/M2 | DIASTOLIC BLOOD PRESSURE: 74 MMHG | SYSTOLIC BLOOD PRESSURE: 120 MMHG | HEIGHT: 74 IN

## 2025-03-27 VITALS
WEIGHT: 150 LBS | SYSTOLIC BLOOD PRESSURE: 117 MMHG | RESPIRATION RATE: 20 BRPM | BODY MASS INDEX: 19.25 KG/M2 | TEMPERATURE: 98 F | HEIGHT: 74 IN | OXYGEN SATURATION: 99 % | HEART RATE: 90 BPM | DIASTOLIC BLOOD PRESSURE: 69 MMHG

## 2025-03-27 DIAGNOSIS — Z87.19 STATUS POST REPAIR OF VENTRAL HERNIA: ICD-10-CM

## 2025-03-27 DIAGNOSIS — E87.20 METABOLIC ACIDOSIS WITH NORMAL ANION GAP AND BICARBONATE LOSSES: Primary | ICD-10-CM

## 2025-03-27 DIAGNOSIS — Z98.890 STATUS POST REPAIR OF VENTRAL HERNIA: ICD-10-CM

## 2025-03-27 DIAGNOSIS — C61 PROSTATE CANCER: Primary | ICD-10-CM

## 2025-03-27 PROCEDURE — 96374 THER/PROPH/DIAG INJ IV PUSH: CPT | Mod: HCNC

## 2025-03-27 PROCEDURE — 99999 PR PBB SHADOW E&M-EST. PATIENT-LVL IV: CPT | Mod: PBBFAC,HCNC,, | Performed by: UROLOGY

## 2025-03-27 PROCEDURE — 63600175 PHARM REV CODE 636 W HCPCS: Mod: HCNC | Performed by: INTERNAL MEDICINE

## 2025-03-27 RX ORDER — EPINEPHRINE 0.3 MG/.3ML
0.3 INJECTION SUBCUTANEOUS ONCE AS NEEDED
OUTPATIENT
Start: 2025-04-03

## 2025-03-27 RX ORDER — SODIUM CHLORIDE 0.9 % (FLUSH) 0.9 %
10 SYRINGE (ML) INJECTION
OUTPATIENT
Start: 2025-04-03

## 2025-03-27 RX ORDER — HEPARIN 100 UNIT/ML
500 SYRINGE INTRAVENOUS
OUTPATIENT
Start: 2025-04-03

## 2025-03-27 RX ORDER — DIPHENHYDRAMINE HYDROCHLORIDE 50 MG/ML
50 INJECTION, SOLUTION INTRAMUSCULAR; INTRAVENOUS ONCE AS NEEDED
OUTPATIENT
Start: 2025-04-03

## 2025-03-27 RX ORDER — SODIUM CHLORIDE 0.9 % (FLUSH) 0.9 %
10 SYRINGE (ML) INJECTION
Status: DISCONTINUED | OUTPATIENT
Start: 2025-03-27 | End: 2025-03-27 | Stop reason: HOSPADM

## 2025-03-27 RX ADMIN — IRON SUCROSE 100 MG: 20 INJECTION, SOLUTION INTRAVENOUS at 01:03

## 2025-03-27 NOTE — PROGRESS NOTES
Patient received dose 7/10 of Venofer 100 mg, given slow IVP. Patient being observed 30 minutes post infusion.     Next appointment scheduled and patient made aware.    Limited head-to-toe assessment due to privacy issues and visit reason though the opportunity was given for patient to express any concerns.

## 2025-03-27 NOTE — TELEPHONE ENCOUNTER
We discussed his low blood pressure today over the phone.  I spoke to the patient and his daughter.  I recommend resuming midodrine 10 mg 3 times daily.  He has chronic low blood pressure.    I told her if his blood pressure does not increase throughout the day today she may have to take him to the emergency room.  He is reporting some falls.    He is off all blood pressure therapy.  Other meds to continue.

## 2025-03-27 NOTE — PHYSICIAN QUERY
Due to the conflicting clinical picture, please clinically validate the diagnosis. If validated, please provide additional clinical support for the diagnosis.   The condition is confirmed. Please specify clinical support (signs, symptoms, and treatment) for the confirmed diagnosis: leukopenia, tachycardia with SOB from flu

## 2025-03-27 NOTE — PROGRESS NOTES
CC: elevated PSA with suspected prostate cancer    Jarrett Charlton Jr. is a 75 y.o. man who is here for Follow-up (Pt states he is here for a follow up visit.)      Jarrett Charlton Jr. is a 70 y.o. man who is here for the evaluation of elevated PSA.  He had Lupron injection treatment for suspected prostate cancer with rising PSA      a history of elevated PSA, negative TUR biopsy in January. History of APR for crohn's disease.  MRI- 15 ccs. PI-RADS 4, 1 cm lesion, right base PZ. Negative extra prostatic extension,NVB,SV nodes.     Hx of no rectum as well as concerning PIRADS lesion on MRI.   Had TURP for biopsying suspicious area      Date: 01/23/2019  Procedure(s) Performed:   TURP  Findings:   Open bladder neck some regrowth of adenoma and suspicious area in right mid prostate   Right side of prostate resected and sent for pathology  SPECIMEN  1) Right prostate chips.  FINAL PATHOLOGIC DIAGNOSIS  Right prostate gland, transurethral resection:  - Benign prostatic tissue with nodular stromal hyperplasia  - Negative for malignancy     Since TURP, he noted that he can void better with better urine flow.  Blood in urine resolved.  However, he reports Occasional ZEB but it got all better.  No more ZEB reported.  He is here with another MRI of prostate following TURP because of still rising PSA up to 6.3 from 4.1.     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  He was not interested in SUDS.  S/p colon surgery and his anus is closed.  Therefore dong TRUS bx of prostate is not feasible.  After discussion, we decided to try finasteride to see whether it will lower his PSA.   He has been on finasteride for the past couple of years. Following TURP in 1/2019, we decided to stop taking finasteride.     When he was considered to undergo TURP, we could not do his surgery because he was not able to stop ASA or Plavix due to fresh vascular  stent.  He got a clearance that he can hold off plavix and ASA prior to TURP.  Hx of ulcerative colitis, had colon surgery back in 1985 and his anus was closed.  Has a neuropathy on the right leg.  Hx of sciatic nerve on both sides and received injection on the back.  Hx of diabetes diagnosed 2 years ago.  No family hx of prostate cancer.  He is a .  Denies flank pain, dysuira, hematuria .       MRI of prostate 8/20/19  Previous biopsy: Negative on 01/23/2019  PSA: 6.3 ng/mL (08/16/2019)  Prior therapy: TURP  Prostate: 3.6 x 3.5 x 3.0 cm corresponding to a computed volume of 15.2 cc.  Peripheral zone: Focal abnormalities with imaging features concerning for prostate cancer.  Lesion (MARYCARMEN) #1  Location: Side: right; Region: base; Zone: posterior peripheral zone laterally  Greatest dimension: 1.0 cm  T2-WI: T2 SI: circumscribed, homogeneous moderate hypointensity--score 4 or 5  DWI/ADC: DWI: Focal markedly hypointense on ADC and markedly hyperintense on high b-value DWI--score 4 or 5  DCE: Positive  Extraprostatic extension: No gross extraprostatic extension noting postsurgical changes limits evaluation.  PI-RADS assessment category: 4    Transitional zone: Mostly surgically resected.  Neurovascular bundle: Normal.  Seminal vesicles:  SV invasion: None  Adjacent Organ Involvement: No focal bladder wall thickening.  No rectal involvement.  Lymphadenopathy: None     On 11/22/19, pt underwent perineal biopsy by IR for CT guided needle bx for the right posterolateral prostate lesion;  Elevated prostate specific antigen (PSA)  Pathology:  BIOPSY OF PROSTATE BED:  FIBROFATTY TISSUE WITH NO NEOPLASIA IDENTIFIED     Pt had his PSA repeated and is now elevated to 7.9.  He was very anxious about possible prostate cancer and would like to to something about it.  So we have started ADT.  He is here with PSA follow up.  Denies any problems. No hot flashes, decreased energy level.    Past Medical History:   Diagnosis  Date    Autonomic dysfunction 11/2023 11/2023 neuro note    Basal cell carcinoma     BPH (benign prostatic hyperplasia)     s/p TURP    CAD (coronary artery disease) 2017    status post PCI to mid LAD in 2017 (LHC in 2021 showed patent stent, otherwise nonobstructive CAD)    Cancer of prostate     s/p TURP    Chronic kidney disease     DDD (degenerative disc disease) 10/21/2013    Diabetes mellitus with renal complications     Disc disease, degenerative, cervical     DVT (deep venous thrombosis)     Encounter for blood transfusion     GERD (gastroesophageal reflux disease)     History of ulcerative colitis 1982    s/p colectomy and ileostomy    HLD (hyperlipidemia)     Ileostomy in place 1982    RBBB     Squamous cell carcinoma 03/08/2018    Left superior helix near insertion    Squamous cell carcinoma 04/12/2018    Left forearm x 5    Ventricular tachycardia      Past Surgical History:   Procedure Laterality Date    ABLATION, SVT, ACCESSORY PATHWAY N/A 4/30/2024    Procedure: Ablation, SVT, Accessory Pathway;  Surgeon: Franky Taylor MD;  Location: [facility] EP LAB;  Service: Cardiology;  Laterality: N/A;  VT, RFA, Carto, MAC, GP, 3 Prep *MDT ILR in situ*    cardiac stents      CATARACT EXTRACTION Bilateral     CERVICAL FUSION      CHOLECYSTECTOMY N/A 2/14/2023    Procedure: CHOLECYSTECTOMY;  Surgeon: Mike Joyce MD;  Location: [facility] OR;  Service: General;  Laterality: N/A;  very high probabilty of converison to open    colectomy and ileostomy  1985    ENDOSCOPIC ULTRASOUND OF UPPER GASTROINTESTINAL TRACT N/A 2/10/2023    Procedure: ULTRASOUND, UPPER GI TRACT, ENDOSCOPIC;  Surgeon: Poli Fuller MD;  Location: [facility] ENDO;  Service: Endoscopy;  Laterality: N/A;    ERCP N/A 2/10/2023    Procedure: ERCP (ENDOSCOPIC RETROGRADE CHOLANGIOPANCREATOGRAPHY);  Surgeon: Poli Fuller MD;  Location: [facility] ENDO;  Service: Endoscopy;  Laterality: N/A;    EXCISION OF PAROTID GLAND Left 12/18/2020    Procedure: EXCISION,  PAROTID GLAND;  Surgeon: Michael Pinzon MD;  Location: [facility] 2ND FLR;  Service: ENT;  Laterality: Left;    INSERTION OF IMPLANTABLE LOOP RECORDER  06/07/2021    INSERTION OF IMPLANTABLE LOOP RECORDER Left 6/7/2021    Procedure: INSERTION, IMPLANTABLE LOOP RECORDER;  Surgeon: Romario Dao MD;  Location: [facility] [place] CATH LAB;  Service: Cardiology;  Laterality: Left;    LEFT HEART CATHETERIZATION Right 4/15/2021    Procedure: CATHETERIZATION, HEART, LEFT;  Surgeon: Marcio Jones MD;  Location: [facility] [place] CATH LAB;  Service: Cardiology;  Laterality: Right;    LYSIS OF ADHESIONS N/A 11/9/2020    Procedure: LYSIS, ADHESIONS,  ERAS low;  Surgeon: CED Haley MD;  Location: [facility] OR 2ND FLR;  Service: Colon and Rectal;  Laterality: N/A;    LYSIS OF ADHESIONS N/A 2/14/2023    Procedure: LYSIS, ADHESIONS;  Surgeon: Mike Joyce MD;  Location: [facility];  Service: General;  Laterality: N/A;    POUCHOSCOPY N/A 4/6/2022    Procedure: ENDOSCOPY, POUCH, SMALL INTESTINE, DIAGNOSTIC;  Surgeon: Dieter Juarez MD;  Location: [facility] ENDO (2ND FLR);  Service: Endoscopy;  Laterality: N/A;    REPAIR, HERNIA, PARASTOMAL N/A 11/9/2020    Procedure: REPAIR, HERNIA, PARASTOMAL;  Surgeon: CED Haley MD;  Location: [facility] OR 2ND FLR;  Service: Colon and Rectal;  Laterality: N/A;    TRANSURETHRAL RESECTION OF PROSTATE (TURP) WITHOUT USE OF LASER N/A 1/23/2019    Procedure: TURP, WITHOUT USING LASER BIPOLAR;  Surgeon: Catarino Mota MD;  Location: [facility] OR 1ST FLR;  Service: Urology;  Laterality: N/A;  1.5 HOURS    TREATMENT OF CARDIAC ARRHYTHMIA  4/30/2024    Procedure: Cardioversion or Defibrillation;  Surgeon: Franky Taylor MD;  Location: [facility] EP LAB;  Service: Cardiology;;     Social History     Tobacco Use    Smoking status: Never     Passive exposure: Never    Smokeless tobacco: Never   Substance Use Topics    Alcohol use: No    Drug use: No     Family History   Problem Relation Name Age of Onset    Dementia Mother       Cancer Father      Diabetes Father      Heart disease Father      Melanoma Neg Hx      Hypertension Neg Hx      Arthritis Neg Hx       Allergy:  Review of patient's allergies indicates:   Allergen Reactions    Atorvastatin     Rosuvastatin      Outpatient Encounter Medications as of 3/27/2025   Medication Sig Dispense Refill    ACCU-CHEK SOFTCLIX LANCETS [facility] TEST BLOOD SUGAR THREE TIMES DAILY 300 each 3    albuterol (PROVENTIL) 2.5 mg /3 mL (0.083 %) nebulizer solution Take 3 mLs (2.5 mg total) by nebulization every 6 (six) hours as needed for Wheezing or Shortness of Breath. Rescue 60 each 6    alcohol swabs (DROPSAFE ALCOHOL PREP PADS) PadM Apply 1 each topically once daily. 500 each 3    allopurinoL (ZYLOPRIM) 100 MG tablet Take 4 tablets (400 mg total) by mouth once daily. 360 tablet 4    apixaban (ELIQUIS) 5 mg Tab Take 1 tablet (5 mg total) by mouth 2 (two) times daily. 180 tablet 3    aspirin (ECOTRIN) 81 MG EC tablet Take 81 mg by mouth once daily.      azelastine (ASTELIN) 137 mcg (0.1 %) nasal spray 1 spray (137 mcg total) by Nasal route 2 (two) times daily. 30 mL 1    blood sugar diagnostic (ACCU-CHEK GUIDE TEST STRIPS) Strp 1 each by Other route 3 (three) times daily. Test Blood Sugar 300 strip 10    blood-glucose meter (ACCU-CHEK GUIDE GLUCOSE METER) [facility] Accucheck BID 1 each 0    brimonidine 0.2% (ALPHAGAN) 0.2 % Drop Place 1 drop into both eyes 2 (two) times a day. 60 mL 3    colchicine (COLCRYS) 0.6 mg tablet Take 1 tablet (0.6 mg total) by mouth every other day. 45 tablet 3    ferrous sulfate (IRON) 325 mg (65 mg iron) Tab tablet Take 1 tablet (325 mg total) by mouth every other day. 45 tablet 3    gabapentin (NEURONTIN) 100 MG capsule Take 2 capsules (200 mg total) by mouth every evening. 60 capsule 11    glimepiride (AMARYL) 4 MG tablet Take 1 tablet (4 mg total) by mouth daily with breakfast.      leuprolide acetate, 6 month, (LUPRON) 45 mg SyKt injection Inject 45 mg into the muscle every 6  (six) months. for 4 doses      meclizine (ANTIVERT) 25 mg tablet Take 1 tablet (25 mg total) by mouth 3 (three) times daily as needed for Dizziness. 60 tablet 3    metoprolol succinate (TOPROL-XL) 25 MG 24 hr tablet Take 0.5 tablets (12.5 mg total) by mouth once daily. 45 tablet 3    midodrine (PROAMATINE) 10 MG tablet Take 1 tablet (10 mg total) by mouth 3 (three) times daily with meals. 90 tablet 11    pantoprazole (PROTONIX) 40 MG tablet TAKE 1 TABLET ONE TIME DAILY 90 tablet 3    pravastatin (PRAVACHOL) 40 MG tablet Take 1 tablet (40 mg total) by mouth once daily. 90 tablet 3    predniSONE (DELTASONE) 2.5 MG tablet Take 2.5 mg by mouth every other day.      tamsulosin (FLOMAX) 0.4 mg Cap Take 1 capsule (0.4 mg total) by mouth once daily. 90 capsule 3    torsemide (DEMADEX) 20 MG Tab Take 1 tablet (20 mg total) by mouth daily as needed (for swelling or shortness of breath). 90 tablet 0    torsemide (DEMADEX) 20 MG Tab Take 1 tablet (20 mg total) by mouth once daily. 30 tablet 0    vit A/C/E ac/ZnOx/cupric oxide (EYE VITAMIN AND MINERALS ORAL) Take 1 tablet by mouth every morning.      amiodarone (PACERONE) 200 MG Tab Take 1 tablet (200 mg total) by mouth once daily. 30 tablet 0    [] hydrOXYzine HCL (ATARAX) 25 MG tablet Take 1 tablet (25 mg total) by mouth 3 (three) times daily as needed for Itching. (Patient taking differently: Take 25 mg by mouth 2 (two) times daily as needed for Itching (itching).) 30 tablet 0    [DISCONTINUED] ACCU-CHEK PAUL CONTROL SOLN Soln 1 drop by NOT APPLICABLE route once daily.      [DISCONTINUED] antiox.mv no.10/omeg3s/lut/gilma (I-CAPS ORAL) Take 1 capsule by mouth once daily.      [DISCONTINUED] benzonatate (TESSALON) 100 MG capsule Take 1 capsule (100 mg total) by mouth 3 (three) times daily as needed for Cough. 30 capsule 0    [DISCONTINUED] docusate sodium 100 mg capsule Take 100 mg by mouth 2 (two) times daily as needed for Constipation.      [DISCONTINUED] glimepiride  (AMARYL) 4 MG tablet TAKE 1 TABLET TWICE DAILY BEFORE MEALS 180 tablet 3    [DISCONTINUED] metoprolol succinate (TOPROL-XL) 25 MG 24 hr tablet Take 1 tablet (25 mg total) by mouth once daily. 90 tablet 3    [DISCONTINUED] midodrine (PROAMATINE) 2.5 MG Tab Take 1 tablet (2.5 mg total) by mouth 2 (two) times daily with meals. (Patient taking differently: Take 2.5 mg by mouth Daily. Patient takes daily with meals.) 60 tablet 1    [DISCONTINUED] sodium bicarbonate 650 MG tablet Take 2 tablets (1,300 mg total) by mouth 2 (two) times daily. 360 tablet 3    [DISCONTINUED] torsemide (DEMADEX) 20 MG Tab Take 1 tablet (20 mg total) by mouth once daily. 30 tablet 0    [DISCONTINUED] torsemide (DEMADEX) 20 MG Tab Take 1 tablet (20 mg total) by mouth daily as needed (for swelling or shortness of breath). 30 tablet 0     Facility-Administered Encounter Medications as of 3/27/2025   Medication Dose Route Frequency Provider Last Rate Last Admin    [COMPLETED] iron sucrose injection 100 mg  100 mg Intravenous 1 time in Clinic/HOD Neftaly Isabel MD   100 mg at 03/27/25 1309    leuprolide acetate (6 month) injection 45 mg  45 mg Intramuscular Q6 Months    45 mg at 02/21/25 0955    leuprolide acetate (6 month) injection 45 mg  45 mg Intramuscular Q6 Months         [COMPLETED] oseltamivir capsule 30 mg  30 mg Oral Daily Jesse Alonzo MD   30 mg at 03/02/25 2137    sodium chloride 0.9% in 1,000 mL infusion   Intravenous Continuous Order, Paper        [DISCONTINUED] acetaminophen tablet 1,000 mg  1,000 mg Oral Q8H PRN Jesse Alonzo MD   1,000 mg at 03/03/25 2249    [DISCONTINUED] allopurinoL tablet 200 mg  200 mg Oral Daily Prashanth Rodriguez DO   200 mg at 03/04/25 0928    [DISCONTINUED] amiodarone tablet 200 mg  200 mg Oral Daily Prashanth Rodriguez DO   200 mg at 03/04/25 0929    [DISCONTINUED] apixaban tablet 5 mg  5 mg Oral BID Moses Greer MD   5 mg at 03/04/25 0928    [DISCONTINUED] aspirin EC tablet 81 mg  81 mg  Oral Daily Jesse Alonzo MD   81 mg at 03/04/25 0928    [DISCONTINUED] benzonatate capsule 100 mg  100 mg Oral TID PRN Patricia Shaw DO   100 mg at 03/03/25 0135    [DISCONTINUED] brimonidine 0.2% ophthalmic solution 1 drop  1 drop Both Eyes BID Jaswinder Vazquez MD   1 drop at 03/04/25 0930    [DISCONTINUED] dextromethorphan-guaiFENesin  mg/5 ml liquid 10 mL  10 mL Oral Q4H PRN Patricia Shaw DO        [DISCONTINUED] dextrose 50% injection 12.5 g  12.5 g Intravenous PRN Moses Greer MD        [DISCONTINUED] dextrose 50% injection 25 g  25 g Intravenous PRN Moses Grere MD        [DISCONTINUED] electrolytes-dextrose (Pedialyte) oral solution 200 mL  200 mL Oral Q4H Jesse Alonzo MD   200 mL at 02/28/25 1545    [DISCONTINUED] electrolytes-dextrose (Pedialyte) oral solution 200 mL  200 mL Oral Q4H Jesse Alonzo MD   200 mL at 03/04/25 1145    [DISCONTINUED] gabapentin capsule 200 mg  200 mg Oral QHS Prashanth Rodriguez DO   200 mg at 03/03/25 1554    [DISCONTINUED] glucagon (human recombinant) injection 1 mg  1 mg Intramuscular PRN Moses Greer MD        [DISCONTINUED] glucose chewable tablet 16 g  16 g Oral PRN Moses Greer MD        [DISCONTINUED] glucose chewable tablet 24 g  24 g Oral PRN Moses Greer MD        [DISCONTINUED] guaiFENesin 12 hr tablet 600 mg  600 mg Oral BID Jesse Alonzo MD   600 mg at 03/04/25 0929    [DISCONTINUED] insulin aspart U-100 pen 0-5 Units  0-5 Units Subcutaneous QID (AC + HS) PRN Moses Greer MD        [DISCONTINUED] melatonin tablet 6 mg  6 mg Oral Nightly Brandon Mccormack MD   6 mg at 03/03/25 2248    [DISCONTINUED] metoprolol succinate (TOPROL-XL) 24 hr split tablet 12.5 mg  12.5 mg Oral Daily Prashanth Rodriguez DO   12.5 mg at 03/04/25 0928    [DISCONTINUED] midodrine tablet 10 mg  10 mg Oral TID WM Prashanth Rodriguez DO   10 mg at 03/04/25 1312    [DISCONTINUED] naloxone 0.4 mg/mL  injection 0.02 mg  0.02 mg Intravenous PRN Moses Greer MD        [DISCONTINUED] oseltamivir capsule 30 mg  30 mg Oral Daily Jesse Alonzo MD   30 mg at 02/26/25 2159    [DISCONTINUED] pantoprazole EC tablet 40 mg  40 mg Oral Daily Jesse Alonzo MD   40 mg at 03/04/25 0929    [DISCONTINUED] pravastatin tablet 40 mg  40 mg Oral Daily Moses Greer MD   40 mg at 03/04/25 0928    [DISCONTINUED] sodium bicarbonate tablet 1,300 mg  1,300 mg Oral BID Jesse Alonzo MD   1,300 mg at 03/04/25 0928    [DISCONTINUED] sodium chloride 0.9% flush 10 mL  10 mL Intravenous Q12H PRN Moses Greer MD   10 mL at 02/26/25 2120    [DISCONTINUED] sodium chloride 0.9% flush 10 mL  10 mL Intravenous PRN Neftaly Isabel MD        [DISCONTINUED] tamsulosin 24 hr capsule 0.4 mg  0.4 mg Oral Daily Moses Greer MD   0.4 mg at 03/03/25 1026     Review of Systems   ROS  Physical Exam     Vitals:    03/27/25 1411   BP: 120/74   Pulse: 86         Physical Exam  Genitalia:  Scrotum: no rash or lesion  Normal symmetric epididymis without masses  Normal vas palpated  Normal size, symmetric testicles with no masses   Normal urethral meatus with no discharge  Normal circumcised penis with no lesion   Rectal:  Normal perineum and anus upon inspection.  Normal tone, no masses or tenderness;     LABS:  Lab Results   Component Value Date    PSA 1.2 10/19/2024    PSA 0.18 11/19/2020    PSA 7.9 (H) 04/20/2020    PSA 2.1 09/15/2014    PSA 2.16 05/13/2013    PSA 1.2 03/19/2012    PSA 1.0 02/19/2010    PSA 1.4 02/17/2009    PSA 1.1 04/30/2007    PSA 1.0 05/12/2006    PSADIAG 1.1 01/31/2025    PSADIAG 1.1 09/01/2024    PSADIAG 2.4 05/09/2024    PSADIAG 1.5 01/10/2024    PSADIAG 0.69 09/08/2023    PSADIAG 0.08 11/22/2022    PSADIAG 0.03 03/04/2022    PSADIAG 0.03 12/16/2021    PSADIAG 0.02 10/29/2021    PSADIAG 0.02 09/14/2021     Results for orders placed or performed in visit on  01/31/25   Prostate Specific Antigen, Diagnostic    Collection Time: 01/31/25  9:25 AM   Result Value Ref Range    PSA Diagnostic 1.1 0.00 - 4.00 ng/mL   Results for orders placed or performed in visit on 09/01/24   PROSTATE SPECIFIC ANTIGEN, DIAGNOSTIC    Collection Time: 09/01/24  7:45 AM   Result Value Ref Range    PSA Diagnostic 1.1 0.00 - 4.00 ng/mL   Results for orders placed or performed in visit on 05/09/24   PROSTATE SPECIFIC ANTIGEN, DIAGNOSTIC    Collection Time: 05/09/24  8:54 AM   Result Value Ref Range    PSA Diagnostic 2.4 0.00 - 4.00 ng/mL     Lab Results   Component Value Date    CREATININE 3.1 (H) 03/04/2025    CREATININE 3.5 (H) 03/03/2025    CREATININE 3.8 (H) 03/02/2025     Results for orders placed or performed in visit on 10/26/23   Testosterone    Collection Time: 10/26/23 11:15 AM   Result Value Ref Range    Testosterone, Total 29 (L) 304 - 1227 ng/dL     *Note: Due to a large number of results and/or encounters for the requested time period, some results have not been displayed. A complete set of results can be found in Results Review.     Urine Culture, Routine   Date Value Ref Range Status   02/25/2025 No growth  Final     Hemoglobin A1C   Date Value Ref Range Status   02/25/2025 6.6 (H) 4.0 - 5.6 % Final     Comment:     ADA Screening Guidelines:  5.7-6.4%  Consistent with prediabetes  >or=6.5%  Consistent with diabetes    High levels of fetal hemoglobin interfere with the HbA1C  assay. Heterozygous hemoglobin variants (HbS, HgC, etc)do  not significantly interfere with this assay.   However, presence of multiple variants may affect accuracy.     12/27/2024 6.5 (H) 4.0 - 5.6 % Final     Comment:     ADA Screening Guidelines:  5.7-6.4%  Consistent with prediabetes  >or=6.5%  Consistent with diabetes    High levels of fetal hemoglobin interfere with the HbA1C  assay. Heterozygous hemoglobin variants (HbS, HgC, etc)do  not significantly interfere with this assay.   However, presence of  "multiple variants may affect accuracy.         Radiology:  MRI of prostate 8/20/19  Previous biopsy: Negative on 01/23/2019  PSA: 6.3 ng/mL (08/16/2019)  Prior therapy: TURP  Prostate: 3.6 x 3.5 x 3.0 cm corresponding to a computed volume of 15.2 cc.  Peripheral zone: Focal abnormalities with imaging features concerning for prostate cancer.  Lesion (MARYCARMEN) #1  Location: Side: right; Region: base; Zone: posterior peripheral zone laterally  Greatest dimension: 1.0 cm  T2-WI: T2 SI: circumscribed, homogeneous moderate hypointensity--score 4 or 5  DWI/ADC: DWI: Focal markedly hypointense on ADC and markedly hyperintense on high b-value DWI--score 4 or 5  DCE: Positive  Extraprostatic extension: No gross extraprostatic extension noting postsurgical changes limits evaluation.  PI-RADS assessment category: 4    Transitional zone: Mostly surgically resected.  Neurovascular bundle: Normal.  Seminal vesicles:  SV invasion: None  Adjacent Organ Involvement: No focal bladder wall thickening.  No rectal involvement.  Lymphadenopathy: None      Assessment and Plan:  Jarrett was seen today for follow-up.    Diagnoses and all orders for this visit:    Prostate cancer  -     Comprehensive Metabolic Panel; Future  -     Prostate Specific Antigen, Diagnostic; Future  -     Testosterone; Future  -     Prior authorization Order  -     leuprolide acetate (6 month) injection 45 mg          hx of "suspected prostate cancer" with abnormal MRI of prostate with rising PSA up to 7.9 in 4/20/20.  Never proved that he has a prostate cancer with tissue diagnosis despite TURP.  Unable to perform prostate biopsy because hs anus is closed off.    his PSA is markedly down since Lupron injection   Has been intermittent therapy.  Last Lupron injection given in 2/26/25     PSA is down to 1.2 from 2.4 and stable at 1.1,  So will plan on continuing Lupron injection.  Continue to follow up with serial PSA 6 months.  Continue flomax.  OK to stop Citracal due " to worsening renal function, per nephrologist.    Follow-up:  Follow up in about 21 weeks (around 8/21/2025), or Lupron injection, for PSA, Testosterone, BMP.

## 2025-03-30 PROBLEM — N17.0 ACUTE RENAL FAILURE WITH ACUTE TUBULAR NECROSIS SUPERIMPOSED ON STAGE 5 CHRONIC KIDNEY DISEASE, NOT ON CHRONIC DIALYSIS: Status: ACTIVE | Noted: 2021-02-11

## 2025-03-30 PROBLEM — N18.5 ACUTE RENAL FAILURE WITH ACUTE TUBULAR NECROSIS SUPERIMPOSED ON STAGE 5 CHRONIC KIDNEY DISEASE, NOT ON CHRONIC DIALYSIS: Status: ACTIVE | Noted: 2021-02-11

## 2025-03-31 PROBLEM — E43 SEVERE MALNUTRITION: Status: ACTIVE | Noted: 2025-03-31

## 2025-04-02 PROBLEM — Z79.01 ON ANTICOAGULANT THERAPY: Status: ACTIVE | Noted: 2025-04-02

## 2025-04-03 PROBLEM — M10.9 GOUT ATTACK: Status: ACTIVE | Noted: 2025-04-03

## 2025-04-04 DIAGNOSIS — M25.511 CHRONIC RIGHT SHOULDER PAIN: Primary | ICD-10-CM

## 2025-04-04 DIAGNOSIS — G89.29 CHRONIC RIGHT SHOULDER PAIN: Primary | ICD-10-CM

## 2025-04-06 ENCOUNTER — PATIENT MESSAGE (OUTPATIENT)
Dept: ELECTROPHYSIOLOGY | Facility: CLINIC | Age: 75
End: 2025-04-06
Payer: MEDICARE

## 2025-04-09 ENCOUNTER — PATIENT MESSAGE (OUTPATIENT)
Dept: SPORTS MEDICINE | Facility: CLINIC | Age: 75
End: 2025-04-09
Payer: MEDICARE

## 2025-04-09 ENCOUNTER — TELEPHONE (OUTPATIENT)
Dept: SPORTS MEDICINE | Facility: CLINIC | Age: 75
End: 2025-04-09
Payer: MEDICARE

## 2025-04-09 ENCOUNTER — PATIENT OUTREACH (OUTPATIENT)
Dept: ADMINISTRATIVE | Facility: CLINIC | Age: 75
End: 2025-04-09
Payer: MEDICARE

## 2025-04-09 ENCOUNTER — PATIENT MESSAGE (OUTPATIENT)
Dept: CARDIOLOGY | Facility: CLINIC | Age: 75
End: 2025-04-09
Payer: MEDICARE

## 2025-04-09 DIAGNOSIS — S46.011D TRAUMATIC COMPLETE TEAR OF RIGHT ROTATOR CUFF, SUBSEQUENT ENCOUNTER: Primary | ICD-10-CM

## 2025-04-09 DIAGNOSIS — M75.21 BICEPS TENDINITIS OF RIGHT UPPER EXTREMITY: ICD-10-CM

## 2025-04-09 DIAGNOSIS — M19.011 GLENOHUMERAL ARTHRITIS, RIGHT: ICD-10-CM

## 2025-04-09 NOTE — PROGRESS NOTES
Mr. Charlton is a patient of Dr. Taylor and was last seen in clinic 1/7/2025.      Subjective:   Patient ID:  Jarrett Charlton Jr. is a 75 y.o. male who presents for follow up of Atrial Fibrillation  .     HPI:    Mr. Charlton is a 75 y.o. male with SBO, s/p colostomy, GERD, parotid mass s/p resection, BPH & prostate CA s/p TURP, CKD 4, pleural plaque, orthostatic hypotension, hypertension, hyperlipidemia, DM2, CAD status post PCI to mid LAD in 2017 (C in 2021 showed patent stent, otherwise nonobstructive CAD), who reportedly had VT, atrial tachycardia s/p AT ablation in 4/2024, atrial fibrillation here for follow up.     Background:    He is a 74M with SBO, s/p colostomy, GERD, parotid mass s/p resection, BPH & prostate CA s/p TURP, CKD 4, pleural plaque, orthostatic hypotension, hypertension, hyperlipidemia, DM2, CAD status post PCI to mid LAD in 2017 (LHC in 2021 showed patent stent, otherwise nonobstructive CAD), who reportedly had VT seen on Holter monitor in 4/2021 and was started on amiodarone around this time. An ILR was implanted in 5/2021, presumably for VT management purposes. Pt reportedly had several episodes of NSVT captured since device implant (longest 36 beats), as well as episodes of AF and SVT.      Echo in 12/2022 showed EF 60%.     At 3/2023 visit pt was on amiodarone 200 mg daily and toprol xl 25 mg daily, in addition to midodrine. Not on DOAC. Device check that day showed stored episodes of AF were in fact most consistent with artifact. Amiodarone stopped at this point.     9/22/2023: [place] ILR alert received for nsVT > 10 beats.  Tachy episode lasting 15 secs, ecg c/w nsVT on 9/03/2023 at 1050 am.      11/7/2023: Atrial fibrillation noted during device remote check: 52 minutes on 10/26/23.  PVCs noted.  No further AF noted past 10/26/23. Pt asymptomatic.      1/31/2024: Report indicated several episodes of probable nsAT/SVT, maximum duration 18 mins 32 secs on 1/28/24; abrupt onset/offsets  noted. Was seen in the hospital on 12/7/2023 for LOC at which time metoprolol was discontinued. Attempt to restart it was unsuccessful (hypotension).      At 3/2024 visit pt stated he has episodes of extreme SOB, LH, fatigue. BPs have been very labile. He is on midodrine. Just took one before this visit today (BP is now very high). Says if he doesn't take it he could become so hypotensive he will faint. Has had multiple syncopal episodes. No palps, CP.     On 4/30/2024 pt underwent EPS. An AT was induced, mapped to 6 o'clock on the tricuspid valve annulus, and successfully ablated.     Pt has had several ED visits/hospitalizations since his ablation, for dehydration, acute neck pain, and hand swelling. ECGs have shown sinus with frequent PACs. Device check in 6/2024 showed an episode of nonsustained AT -340 ms, lasting 6 seconds. Otherwise no events.     At 7/2024 visit pt stated his dizzy spells had subsided since ablation.     Pt presented to [facility] in 8/2024 with swelling and body aches, and found to be in atrial flutter vs coarse AF. US revealed DVT in left external iliac vein, common femoral vein, and greater saphenous vein. VQ scan with low probability of PE. Discharged in sinus rhythm on eliquis.     Dr. Taylor reviewed an 8/2024 device check which showed a 23 hour episode of AF on 8/11/2024. Pt feeling dizzy, which he attributes to his antihypertensives which includes lasix 40 mg daily. He did have leg and hand swelling but these have improved since starting eliquis.     ECG on 9/30/2024 showed NSR at 69 bpm with PACs and RBBB, 107 bpm.    In summary from 9/30/2024 visit, Jarrett Charlton Jr. is a 74M with a reported history of VT, AF, and AT. Intolerant to beta blockers. Now 5 months status-post AT ablation--recent admission to [facility] with AFL vs AF in the setting of DVT. Now on eliquis. Plan is to stop lasix as pt is becoming hypotensive after taking his AM meds (leg and hand swelling improved),  continue eliquis for LE DVT and AF, follow-up 6 months.    12/6/2024: Patient was noted to be in AF with RVR when seeing his cardiologist. Dr. Taylor was made aware and amiodarone 200mg BID x 4 weeks then 200mg daily was initiated.     12/19-20/2024: Admitted to [facility] for CHF exacerbation. He improved with IV diuresis and was discharged home.     1/7/2025: ILR interrogation reviewed with Dr. Taylor. Patient with episodes of AF and AT. ILR EOS on 10/4/2024. Had discussion with patient regarding PVI-RFA. Discuss risks and benefits with patient. Possible that his AF with RVR is contributing to his cardiomyopathy. Discuss with patient regarding the nature of RFA PVI including transseptal puncture. We discussed risks and benefits at length. Our discussion included, but was not limited to the risk of death, infection, bleeding, stroke, MI, cardiac perforation, embolism, cardiac tamponade, vascular injury, AE fistula, injury to phrenic nerve, pulmonary vein stenosis and other organic injury including the possibility for need for surgery or pacemaker implantation. We discussed success rates and possible need for redo procedures. Patient verbalized understanding. All questions answered, no further questions or concerns, patient would like to proceed.     Case discussed with Dr. Taylor.   Continue amiodarone load of 200 mg BID for 4 weeks then decrease to 200 mg daily  PVI-RFA  Continue Eliquis 5mg BID. Hold Eliquis only the morning of the procedure.     Update (04/14/2025):    The night of 1/23/2025 he presented to the emergency room at [facility] due to chest pain. His legs were edematous. He had a venous duplex that revealed a nonocclusive thrombus in the left greater saphenous vein. Ultrasound showed nonocclusive DVT within the left greater saphenous vein.  Greater saphenous vein is a superficial vein therefore this is not really a DVT.    -V/Q scan low probability for pulmonary embolism.    -Overall did  not feel that patient really failed treatment with the apixaban.  Light of increased risk of bleeding complication rivaroxaban compared to apixaban will continue with long-term anticoagulation with apixaban.     2/25/2024-3/4/2025: Patient presenting with worsening renal function and hypotension. OP provider recently reduced midodrine dose. Restarted patient on Midodrine 10 TID. Lowered Toprol dose to 12.5 QD. Nephrology following for GRADY on CKD, likely intrinsic ATN in setting of hypotension. Patient tested positive for Flu A and started on 5 day course of Tamiflu.   Remaining in SR primarily on amiodarone.    3/29/2025: Admitted with hypotension.    Ablation had been scheduled for 4/4/2025. Canceled as pt was in the hospital.    Today pt reports that he had some LH today otherwise ok. Chronic WILSON. No CP, palps.   Mechanical fall 2-3 mo ago. Shoulder injury and plan is for surgery later this month.    He is currently taking eliquis 5mg BID for stroke prophylaxis and denies significant bleeding episodes. He is currently being treated with amiodarone 200mg daily for rhythm control and toprol 12.5mg daily for HR control.  Kidney function is low, with a creatinine of 4.4 on 4/8/2025. TSH WNL 9/2024. LFTs WNL 4/2025.    I have personally reviewed the patient's EKG today, which shows sinus rhythm with RBBB at 64bpm. IA interval is 188. QRS is 148. QTc is 505.    Relevant Cardiac Test Results:    2D Echo (2/26/2025):    Left Ventricle: The left ventricle is normal in size. Normal wall thickness. There is concentric hypertrophy. Severe global hypokinesis present. There is moderately reduced systolic function with a visually estimated ejection fraction of 30 - 35%. Quantitated ejection fraction is 30%. There is indeterminate diastolic function. No thrombus.    Right Ventricle: The right ventricle is normal in size. Wall thickness is normal. Systolic function is normal.    Left Atrium: The left atrium is mildly dilated.     Mitral Valve: There is mild to moderate regurgitation with a centrally directed jet.    Tricuspid Valve: There is mild regurgitation with a centrally directed jet.    Pulmonary Artery: The estimated pulmonary artery systolic pressure is 33 mmHg.    IVC/SVC: Intermediate venous pressure at 8 mmHg.    Current Outpatient Medications   Medication Sig    ACCU-CHEK SOFTCLIX LANCETS [facility] TEST BLOOD SUGAR THREE TIMES DAILY    albuterol (PROVENTIL) 2.5 mg /3 mL (0.083 %) nebulizer solution Take 3 mLs (2.5 mg total) by nebulization every 6 (six) hours as needed for Wheezing or Shortness of Breath. Rescue    alcohol swabs (DROPSAFE ALCOHOL PREP PADS) PadM Apply 1 each topically once daily.    allopurinoL (ZYLOPRIM) 100 MG tablet Take 4 tablets (400 mg total) by mouth once daily.    amiodarone (PACERONE) 200 MG Tab Take 1 tablet (200 mg total) by mouth once daily.    apixaban (ELIQUIS) 5 mg Tab Take 1 tablet (5 mg total) by mouth 2 (two) times daily.    aspirin (ECOTRIN) 81 MG EC tablet Take 81 mg by mouth once daily.    azelastine (ASTELIN) 137 mcg (0.1 %) nasal spray 1 spray (137 mcg total) by Nasal route 2 (two) times daily.    blood sugar diagnostic (ACCU-CHEK GUIDE TEST STRIPS) Strp 1 each by Other route 3 (three) times daily. Test Blood Sugar    blood-glucose meter (ACCU-CHEK GUIDE GLUCOSE METER) [facility] Accucheck BID    brimonidine 0.2% (ALPHAGAN) 0.2 % Drop Place 1 drop into both eyes 2 (two) times a day.    colchicine (COLCRYS) 0.6 mg tablet Take 1 tablet (0.6 mg total) by mouth every other day.    ferrous sulfate (IRON) 325 mg (65 mg iron) Tab tablet Take 1 tablet (325 mg total) by mouth every other day.    gabapentin (NEURONTIN) 100 MG capsule Take 2 capsules (200 mg total) by mouth every evening.    glimepiride (AMARYL) 4 MG tablet Take 1 tablet (4 mg total) by mouth daily with breakfast.    leuprolide acetate, 6 month, (LUPRON) 45 mg SyKt injection Inject 45 mg into the muscle every 6 (six) months. for 4 doses     meclizine (ANTIVERT) 25 mg tablet Take 1 tablet (25 mg total) by mouth 3 (three) times daily as needed for Dizziness.    metoprolol succinate (TOPROL-XL) 25 MG 24 hr tablet Take 0.5 tablets (12.5 mg total) by mouth once daily.    midodrine (PROAMATINE) 10 MG tablet Take 1 tablet (10 mg total) by mouth 3 (three) times daily with meals.    pantoprazole (PROTONIX) 40 MG tablet TAKE 1 TABLET ONE TIME DAILY    pravastatin (PRAVACHOL) 40 MG tablet Take 1 tablet (40 mg total) by mouth once daily.    predniSONE (DELTASONE) 2.5 MG tablet Take 2.5 mg by mouth every other day.    tamsulosin (FLOMAX) 0.4 mg Cap Take 1 capsule (0.4 mg total) by mouth once daily.    vit A/C/E ac/ZnOx/cupric oxide (EYE VITAMIN AND MINERALS ORAL) Take 1 tablet by mouth every morning.     Current Facility-Administered Medications   Medication    leuprolide acetate (6 month) injection 45 mg    leuprolide acetate (6 month) injection 45 mg     Facility-Administered Medications Ordered in Other Visits   Medication    sodium chloride 0.9% in 1,000 mL infusion       Review of Systems   Constitutional: Negative for malaise/fatigue.   Cardiovascular:  Positive for dyspnea on exertion (chronic). Negative for chest pain, irregular heartbeat, leg swelling and palpitations.   Respiratory:  Negative for shortness of breath.    Hematologic/Lymphatic: Negative for bleeding problem.   Skin:  Negative for rash.   Musculoskeletal:  Positive for joint pain (shoulder). Negative for myalgias.   Gastrointestinal:  Negative for hematemesis, hematochezia and nausea.   Genitourinary:  Negative for hematuria.   Neurological:  Positive for light-headedness.   Psychiatric/Behavioral:  Negative for altered mental status.    Allergic/Immunologic: Negative for persistent infections.       Objective:          /66   Pulse 64   Wt 77.1 kg (170 lb)   BMI 21.83 kg/m²     Physical Exam  Vitals and nursing note reviewed.   Constitutional:       Appearance: Normal appearance. He  is well-developed.   HENT:      Head: Normocephalic.      Nose: Nose normal.   Eyes:      Pupils: Pupils are equal, round, and reactive to light.   Cardiovascular:      Rate and Rhythm: Normal rate and regular rhythm.   Pulmonary:      Effort: No respiratory distress.   Musculoskeletal:         General: Normal range of motion.   Skin:     General: Skin is warm and dry.      Findings: No erythema.   Neurological:      Mental Status: He is alert and oriented to person, place, and time.   Psychiatric:         Speech: Speech normal.         Behavior: Behavior normal.           Lab Results   Component Value Date     04/08/2025     03/04/2025    K 4.7 04/08/2025    K 4.4 03/04/2025    MG 1.9 04/08/2025    BUN 78 (H) 04/08/2025    CREATININE 4.4 (H) 04/08/2025    ALT 30 04/08/2025    ALT 43 03/04/2025    AST 19 04/08/2025    AST 34 03/04/2025    HGB 7.3 (L) 04/08/2025    HGB 9.3 (L) 03/04/2025    HCT 24.7 (L) 04/08/2025    HCT 30.0 (L) 03/04/2025    HCT 37.2 02/25/2025    TSH 2.763 09/01/2024    LDLCALC 71.0 08/04/2024       Recent Labs   Lab 01/23/25  1923 01/24/25  0836 02/08/25  1541 03/01/25  0420   INR 1.3 H 1.4 H 1.5 H 1.1       Assessment:     1. PAF (paroxysmal atrial fibrillation)    2. Essential hypertension    3. Chronic combined systolic and diastolic heart failure    4. Orthostatic hypotension    5. On anticoagulant therapy        Plan:     In summary, Mr. Charlton is a 75 y.o. male with SBO, s/p colostomy, GERD, parotid mass s/p resection, BPH & prostate CA s/p TURP, CKD 4, pleural plaque, orthostatic hypotension, hypertension, hyperlipidemia, DM2, CAD status post PCI to mid LAD in 2017 (LHC in 2021 showed patent stent, otherwise nonobstructive CAD), who reportedly had VT, atrial tachycardia s/p AT ablation in 4/2024, atrial fibrillation here for follow up.   He appears to be maintaining sinus rhythm on amiodarone. ILR EOS on 10/4/2024. Plan was for ablation but this was deferred as pt was in the  hospital with [facility]. He had a mechanical fall a couple of months ago and plan is for shoulder surgery at the end of the month. Recommend continuing amiodarone for now with plan to proceed with ablation after he has recovered. He is on eliquis for CVA prophylaxis.    Rotator cuff surgery April 29th.   Continue current meds for now  RTC 3 mo to review readiness for PVI, sooner if needed    *A copy of this note has been sent to Dr. Taylor*    Follow up in about 3 months (around 7/14/2025).      ------------------------------------------------------------------    MEGHA Lozano, NP-C  Cardiac Electrophysiology

## 2025-04-09 NOTE — PROGRESS NOTES
C3 nurse contacted Jarrett Charlton Jr. for a TCC post hospital discharge follow-up call. The patient politely declined call at this time. Jarrett Charlton Jr. has a follow-up with his PCP, Tripp Mohan MD on 4/10/25 at 1040. Message routed to Tripp Mohan MD requesting visit type change to 'HOSFU.'

## 2025-04-09 NOTE — TELEPHONE ENCOUNTER
Called and spoke to patient's daughter, per his request.  Patient is scheduled for CT scan on 4/17/25 at the Imaging center on [facility] and pre op appointment on 4/22/25.

## 2025-04-10 ENCOUNTER — RESULTS FOLLOW-UP (OUTPATIENT)
Dept: GASTROENTEROLOGY | Facility: CLINIC | Age: 75
End: 2025-04-10

## 2025-04-10 ENCOUNTER — PATIENT MESSAGE (OUTPATIENT)
Dept: INTERNAL MEDICINE | Facility: CLINIC | Age: 75
End: 2025-04-10
Payer: MEDICARE

## 2025-04-14 ENCOUNTER — HOSPITAL ENCOUNTER (OUTPATIENT)
Dept: CARDIOLOGY | Facility: CLINIC | Age: 75
Discharge: HOME | End: 2025-04-14
Payer: MEDICARE

## 2025-04-14 ENCOUNTER — OFFICE VISIT (OUTPATIENT)
Dept: ELECTROPHYSIOLOGY | Facility: CLINIC | Age: 75
End: 2025-04-14
Payer: MEDICARE

## 2025-04-14 VITALS
DIASTOLIC BLOOD PRESSURE: 66 MMHG | WEIGHT: 170 LBS | BODY MASS INDEX: 21.83 KG/M2 | SYSTOLIC BLOOD PRESSURE: 136 MMHG | HEART RATE: 64 BPM

## 2025-04-14 DIAGNOSIS — I50.42 CHRONIC COMBINED SYSTOLIC AND DIASTOLIC HEART FAILURE: ICD-10-CM

## 2025-04-14 DIAGNOSIS — I10 ESSENTIAL HYPERTENSION: ICD-10-CM

## 2025-04-14 DIAGNOSIS — Z79.01 ON ANTICOAGULANT THERAPY: ICD-10-CM

## 2025-04-14 DIAGNOSIS — I48.0 PAROXYSMAL ATRIAL FIBRILLATION: Primary | ICD-10-CM

## 2025-04-14 DIAGNOSIS — I95.1 ORTHOSTATIC HYPOTENSION: ICD-10-CM

## 2025-04-14 DIAGNOSIS — I48.0 PAROXYSMAL ATRIAL FIBRILLATION: ICD-10-CM

## 2025-04-14 DIAGNOSIS — I48.0 PAF (PAROXYSMAL ATRIAL FIBRILLATION): Primary | ICD-10-CM

## 2025-04-14 LAB
OHS QRS DURATION: 148 MS
OHS QTC CALCULATION: 505 MS

## 2025-04-14 PROCEDURE — 99999 PR PBB SHADOW E&M-EST. PATIENT-LVL IV: CPT | Mod: PBBFAC,HCNC,, | Performed by: NURSE PRACTITIONER

## 2025-04-14 PROCEDURE — 93010 ELECTROCARDIOGRAM REPORT: CPT | Mod: HCNC,S$GLB,, | Performed by: INTERNAL MEDICINE

## 2025-04-14 PROCEDURE — 93005 ELECTROCARDIOGRAM TRACING: CPT | Mod: HCNC,S$GLB,, | Performed by: INTERNAL MEDICINE

## 2025-04-14 RX ORDER — AMIODARONE HYDROCHLORIDE 200 MG/1
200 TABLET ORAL DAILY
Qty: 90 TABLET | Refills: 1 | Status: SHIPPED | OUTPATIENT
Start: 2025-04-14 | End: 2025-05-14

## 2025-04-16 DIAGNOSIS — G47.62 NOCTURNAL LEG CRAMPS: ICD-10-CM

## 2025-04-16 RX ORDER — LANOLIN ALCOHOL/MO/W.PET/CERES
1 CREAM (GRAM) TOPICAL
Qty: 90 TABLET | Refills: 3 | OUTPATIENT
Start: 2025-04-16

## 2025-04-17 ENCOUNTER — OFFICE VISIT (OUTPATIENT)
Dept: CARDIOLOGY | Facility: CLINIC | Age: 75
End: 2025-04-17
Payer: MEDICARE

## 2025-04-17 ENCOUNTER — PATIENT MESSAGE (OUTPATIENT)
Dept: SPORTS MEDICINE | Facility: CLINIC | Age: 75
End: 2025-04-17
Payer: MEDICARE

## 2025-04-17 ENCOUNTER — PATIENT MESSAGE (OUTPATIENT)
Dept: INTERNAL MEDICINE | Facility: CLINIC | Age: 75
End: 2025-04-17
Payer: MEDICARE

## 2025-04-17 ENCOUNTER — TELEPHONE (OUTPATIENT)
Dept: ENDOSCOPY | Facility: HOSPITAL | Age: 75
End: 2025-04-17
Payer: MEDICARE

## 2025-04-17 ENCOUNTER — TELEPHONE (OUTPATIENT)
Dept: INTERNAL MEDICINE | Facility: CLINIC | Age: 75
End: 2025-04-17
Payer: MEDICARE

## 2025-04-17 ENCOUNTER — INFUSION (OUTPATIENT)
Dept: INFECTIOUS DISEASES | Facility: HOSPITAL | Age: 75
End: 2025-04-17
Payer: MEDICARE

## 2025-04-17 ENCOUNTER — PATIENT MESSAGE (OUTPATIENT)
Dept: INTERNAL MEDICINE | Facility: CLINIC | Age: 75
End: 2025-04-17

## 2025-04-17 VITALS
SYSTOLIC BLOOD PRESSURE: 147 MMHG | OXYGEN SATURATION: 99 % | BODY MASS INDEX: 21.82 KG/M2 | HEIGHT: 74 IN | BODY MASS INDEX: 21.82 KG/M2 | RESPIRATION RATE: 16 BRPM | DIASTOLIC BLOOD PRESSURE: 70 MMHG | SYSTOLIC BLOOD PRESSURE: 122 MMHG | HEART RATE: 64 BPM | HEART RATE: 70 BPM | RESPIRATION RATE: 18 BRPM | HEIGHT: 74 IN | TEMPERATURE: 98 F | WEIGHT: 170 LBS | WEIGHT: 170 LBS | OXYGEN SATURATION: 99 % | DIASTOLIC BLOOD PRESSURE: 68 MMHG

## 2025-04-17 DIAGNOSIS — N18.32 TYPE 2 DIABETES MELLITUS WITH STAGE 3B CHRONIC KIDNEY DISEASE, WITHOUT LONG-TERM CURRENT USE OF INSULIN: ICD-10-CM

## 2025-04-17 DIAGNOSIS — E78.5 HYPERLIPIDEMIA, UNSPECIFIED HYPERLIPIDEMIA TYPE: ICD-10-CM

## 2025-04-17 DIAGNOSIS — R42 DIZZINESS: ICD-10-CM

## 2025-04-17 DIAGNOSIS — E11.22 TYPE 2 DIABETES MELLITUS WITH STAGE 3B CHRONIC KIDNEY DISEASE, WITHOUT LONG-TERM CURRENT USE OF INSULIN: ICD-10-CM

## 2025-04-17 DIAGNOSIS — Z98.890 STATUS POST REPAIR OF VENTRAL HERNIA: ICD-10-CM

## 2025-04-17 DIAGNOSIS — E87.20 METABOLIC ACIDOSIS: Primary | ICD-10-CM

## 2025-04-17 DIAGNOSIS — Z87.19 STATUS POST REPAIR OF VENTRAL HERNIA: ICD-10-CM

## 2025-04-17 DIAGNOSIS — I50.42 CHRONIC COMBINED SYSTOLIC AND DIASTOLIC HEART FAILURE: ICD-10-CM

## 2025-04-17 DIAGNOSIS — I45.2 BIFASCICULAR BLOCK: ICD-10-CM

## 2025-04-17 DIAGNOSIS — I70.0 AORTIC ATHEROSCLEROSIS: ICD-10-CM

## 2025-04-17 DIAGNOSIS — I25.10 CORONARY ARTERY DISEASE INVOLVING NATIVE CORONARY ARTERY OF NATIVE HEART WITHOUT ANGINA PECTORIS: Primary | ICD-10-CM

## 2025-04-17 DIAGNOSIS — I10 ESSENTIAL HYPERTENSION: ICD-10-CM

## 2025-04-17 DIAGNOSIS — I27.20 PULMONARY HYPERTENSION: ICD-10-CM

## 2025-04-17 PROCEDURE — 1159F MED LIST DOCD IN RCRD: CPT | Mod: CPTII,S$GLB,, | Performed by: INTERNAL MEDICINE

## 2025-04-17 PROCEDURE — 1111F DSCHRG MED/CURRENT MED MERGE: CPT | Mod: CPTII,S$GLB,, | Performed by: INTERNAL MEDICINE

## 2025-04-17 PROCEDURE — 3078F DIAST BP <80 MM HG: CPT | Mod: CPTII,S$GLB,, | Performed by: INTERNAL MEDICINE

## 2025-04-17 PROCEDURE — 1160F RVW MEDS BY RX/DR IN RCRD: CPT | Mod: CPTII,S$GLB,, | Performed by: INTERNAL MEDICINE

## 2025-04-17 PROCEDURE — 3074F SYST BP LT 130 MM HG: CPT | Mod: CPTII,S$GLB,, | Performed by: INTERNAL MEDICINE

## 2025-04-17 PROCEDURE — 99214 OFFICE O/P EST MOD 30 MIN: CPT | Mod: S$GLB,,, | Performed by: INTERNAL MEDICINE

## 2025-04-17 PROCEDURE — 63600175 PHARM REV CODE 636 W HCPCS: Mod: HCNC | Performed by: INTERNAL MEDICINE

## 2025-04-17 PROCEDURE — 96374 THER/PROPH/DIAG INJ IV PUSH: CPT | Mod: HCNC

## 2025-04-17 PROCEDURE — 1126F AMNT PAIN NOTED NONE PRSNT: CPT | Mod: CPTII,S$GLB,, | Performed by: INTERNAL MEDICINE

## 2025-04-17 PROCEDURE — 99999 PR PBB SHADOW E&M-EST. PATIENT-LVL V: CPT | Mod: PBBFAC,,, | Performed by: INTERNAL MEDICINE

## 2025-04-17 RX ORDER — DIPHENHYDRAMINE HYDROCHLORIDE 50 MG/ML
50 INJECTION, SOLUTION INTRAMUSCULAR; INTRAVENOUS ONCE AS NEEDED
OUTPATIENT
Start: 2025-04-24

## 2025-04-17 RX ORDER — HEPARIN 100 UNIT/ML
500 SYRINGE INTRAVENOUS
OUTPATIENT
Start: 2025-04-24

## 2025-04-17 RX ORDER — SODIUM CHLORIDE 0.9 % (FLUSH) 0.9 %
10 SYRINGE (ML) INJECTION
OUTPATIENT
Start: 2025-04-24

## 2025-04-17 RX ORDER — SODIUM CHLORIDE 0.9 % (FLUSH) 0.9 %
10 SYRINGE (ML) INJECTION
Status: DISCONTINUED | OUTPATIENT
Start: 2025-04-17 | End: 2025-04-17 | Stop reason: HOSPADM

## 2025-04-17 RX ORDER — EPINEPHRINE 0.3 MG/.3ML
0.3 INJECTION SUBCUTANEOUS ONCE AS NEEDED
OUTPATIENT
Start: 2025-04-24

## 2025-04-17 RX ADMIN — IRON SUCROSE 100 MG: 20 INJECTION, SOLUTION INTRAVENOUS at 12:04

## 2025-04-17 NOTE — PROGRESS NOTES
[facility] Cardiology     Subjective:    Patient ID:  Jarrett Charlton Jr. is a 75 y.o. male who presents for follow-up of Chronic renal failure, stage IV; Atrial Fibrillation; Congestive Heart Failure; Hyperlipidemia; Coronary Artery Disease; and Hypotension requiring midodrine    Review of patient's allergies indicates:   Allergen Reactions    Atorvastatin     Rosuvastatin      The patient returns for reassessment.  He had a right shoulder injury and needs clearance for rotator cuff surgery.  He can barely lift his arm.  He is right-handed    He has recent hospitalization for worsening renal function.  He has CKD 4.  He follows with Nephrology.  He is taking Demadex but he does not know the dose.  He has labs and a follow-up visit next week with Nephrology.    He has ejection fraction 30-35% February study.  His EF used to show normal values a couple of years ago.  He has had AFib ablation which was successful but in follow-up there was significant recurrent AFib and there is a 2nd ablation procedure scheduled.  He takes Eliquis.  The ablation was put off because of his upcoming shoulder surgery.  He remains in sinus rhythm on amiodarone initiated 2024 December.  Unfortunately serial EF on echo have not improved since initiating amiodarone.  He does not get angina.    His coronary artery disease history includes stenting to LAD 2017.  Most recent heart catheterization in 2021 showed no obstructive coronary disease.  He has no infarct pattern on electrocardiogram.  Most recent Electrocardiogram shows left axis, right bundle branch block sinus rhythm.    He is reporting dizziness.  He states that it started within the past couple of months.         Review of Systems   Constitutional: Negative for chills, decreased appetite, diaphoresis, fever, malaise/fatigue, night sweats, weight gain and weight loss.   HENT:  Negative for congestion, ear  discharge, ear pain, hearing loss, hoarse voice, nosebleeds, odynophagia, sore throat, stridor and tinnitus.    Eyes:  Negative for blurred vision, discharge, double vision, pain, photophobia, redness, vision loss in left eye, vision loss in right eye, visual disturbance and visual halos.   Cardiovascular:  Positive for leg swelling. Negative for chest pain, claudication, cyanosis, dyspnea on exertion, irregular heartbeat, near-syncope, orthopnea, palpitations, paroxysmal nocturnal dyspnea and syncope.   Respiratory:  Positive for shortness of breath. Negative for cough, hemoptysis, sleep disturbances due to breathing, snoring, sputum production and wheezing.    Endocrine: Negative for cold intolerance, heat intolerance, polydipsia, polyphagia and polyuria.   Hematologic/Lymphatic: Negative for adenopathy and bleeding problem. Does not bruise/bleed easily.   Skin:  Negative for color change, dry skin, flushing, itching, nail changes, poor wound healing, rash, skin cancer, suspicious lesions and unusual hair distribution.   Musculoskeletal:  Positive for joint pain. Negative for arthritis, back pain, falls, gout, joint swelling, muscle cramps, muscle weakness, myalgias, neck pain and stiffness.   Gastrointestinal:  Negative for bloating, abdominal pain, anorexia, change in bowel habit, bowel incontinence, constipation, diarrhea, dysphagia, excessive appetite, flatus, heartburn, hematemesis, hematochezia, hemorrhoids, jaundice, melena, nausea and vomiting.   Genitourinary:  Negative for bladder incontinence, decreased libido, dysuria, flank pain, frequency, genital sores, hematuria, hesitancy, incomplete emptying, nocturia and urgency.   Neurological:  Negative for aphonia, brief paralysis, difficulty with concentration, disturbances in coordination, excessive daytime sleepiness, dizziness, focal weakness, headaches, light-headedness, loss of balance, numbness, paresthesias, seizures, sensory change, tremors, vertigo  "and weakness.   Psychiatric/Behavioral:  Negative for altered mental status, depression, hallucinations, memory loss, substance abuse, suicidal ideas and thoughts of violence. The patient does not have insomnia and is not nervous/anxious.    Allergic/Immunologic: Negative for hives and persistent infections.        Objective:       Vitals:    04/17/25 1102   BP: 122/68   BP Location: Left arm   Patient Position: Sitting   Pulse: 64   Resp: 16   SpO2: 99%   Weight: 77.1 kg (170 lb)   Height: 6' 2" (1.88 m)    Physical Exam  Constitutional:       General: He is not in acute distress.     Appearance: He is well-developed. He is not diaphoretic.   HENT:      Head: Normocephalic and atraumatic.      Nose: Nose normal.   Eyes:      General: No scleral icterus.        Right eye: No discharge.      Conjunctiva/sclera: Conjunctivae normal.      Pupils: Pupils are equal, round, and reactive to light.   Neck:      Thyroid: No thyromegaly.      Vascular: No JVD.      Trachea: No tracheal deviation.   Cardiovascular:      Rate and Rhythm: Normal rate and regular rhythm.      Pulses:           Carotid pulses are 2+ on the right side and 2+ on the left side.       Radial pulses are 2+ on the right side and 2+ on the left side.      Heart sounds: Normal heart sounds. No murmur heard.     No friction rub. No gallop.   Pulmonary:      Effort: Pulmonary effort is normal. No respiratory distress.      Breath sounds: Normal breath sounds. No stridor. No wheezing or rales.   Chest:      Chest wall: No tenderness.   Abdominal:      General: Bowel sounds are normal. There is no distension.      Palpations: Abdomen is soft. There is no mass.      Tenderness: There is no abdominal tenderness. There is no guarding or rebound.   Musculoskeletal:         General: No tenderness. Normal range of motion.      Cervical back: Normal range of motion and neck supple.   Lymphadenopathy:      Cervical: No cervical adenopathy.   Skin:     General: Skin " is warm and dry.      Coloration: Skin is not pale.      Findings: No erythema or rash.   Neurological:      Mental Status: He is alert and oriented to person, place, and time.      Cranial Nerves: No cranial nerve deficit.      Coordination: Coordination normal.   Psychiatric:         Behavior: Behavior normal.         Thought Content: Thought content normal.         Judgment: Judgment normal.           Assessment:       1. Coronary artery disease involving native coronary artery of native heart without angina pectoris    2. Pulmonary hypertension    3. Chronic combined systolic and diastolic heart failure    4. Essential hypertension    5. Hyperlipidemia, unspecified hyperlipidemia type    6. Aortic atherosclerosis    7. Bifascicular block    8. Type 2 diabetes mellitus with stage 3b chronic kidney disease, without long-term current use of insulin    9. Dizziness      Results for orders placed or performed during the hospital encounter of 04/14/25   Rhythm strip    Collection Time: 04/14/25 10:40 AM   Result Value Ref Range    QRS Duration 148 ms    OHS QTC Calculation 505 ms     *Note: Due to a large number of results and/or encounters for the requested time period, some results have not been displayed. A complete set of results can be found in Results Review.       Current Medications[1]     Lab Results   Component Value Date    WBC 3.23 (L) 04/08/2025    RBC 2.47 (L) 04/08/2025    HGB 7.3 (L) 04/08/2025    HCT 24.7 (L) 04/08/2025     (H) 04/08/2025    MCH 29.6 04/08/2025    MCHC 29.6 (L) 04/08/2025    RDW 20.2 (H) 04/08/2025     (L) 04/08/2025    MPV 11.8 04/08/2025    GRAN 1.9 03/04/2025    GRAN 62.6 03/04/2025    LYMPH 24.1 04/08/2025    LYMPH 0.78 (L) 04/08/2025    MONO 9.9 04/08/2025    MONO 0.32 04/08/2025    EOS 4.3 04/08/2025    EOS 0.14 04/08/2025    BASO 0.00 03/04/2025    EOSINOPHIL 2.9 03/04/2025    BASOPHIL 0.3 04/08/2025    BASOPHIL 0.01 04/08/2025    MG 1.9 04/08/2025        CMP  Lab  Results   Component Value Date     04/08/2025    K 4.7 04/08/2025     (H) 04/08/2025    CO2 17 (L) 04/08/2025     03/04/2025    BUN 78 (H) 04/08/2025    CREATININE 4.4 (H) 04/08/2025    CALCIUM 7.9 (L) 04/08/2025    PROT 5.2 (L) 03/04/2025    ALBUMIN 2.1 (L) 04/08/2025    BILITOT 0.4 04/08/2025    ALKPHOS 92 04/08/2025    AST 19 04/08/2025    ALT 30 04/08/2025    ANIONGAP 7 (L) 04/08/2025    ESTGFRAFRICA 18.9 (A) 07/27/2022    EGFRNONAA 16.3 (A) 07/27/2022        Lab Results   Component Value Date    LABBLOO No growth after 5 days. 02/25/2025    LABBLOO No growth after 5 days. 02/25/2025    LABURIN No growth 02/25/2025            Results for orders placed or performed during the hospital encounter of 03/29/25   EKG 12-lead    Collection Time: 03/29/25  7:18 PM   Result Value Ref Range    QRS Duration 168 ms    OHS QTC Calculation 511 ms    Narrative    Test Reason : Z13.6,    Vent. Rate :  61 BPM     Atrial Rate :  61 BPM     P-R Int : 204 ms          QRS Dur : 168 ms      QT Int : 508 ms       P-R-T Axes :  43 -75  59 degrees    QTcB Int : 511 ms    Normal sinus rhythm  Left axis deviation  Right bundle branch block  Anteroseptal infarct (cited on or before 12-Jul-2023)  Abnormal ECG  When compared with ECG of 25-Feb-2025 17:00,  No significant change was found  Confirmed by KALEN Arechiga ([id number]) on 3/31/2025 9:26:26 AM    Referred By: AAAREFERRAL SELF           Confirmed By: KALEN Arechiga                   Plan:       Problem List Items Addressed This Visit          Cardiac/Vascular    Essential hypertension    He now has low blood pressure readings requiring midodrine.         HLD (hyperlipidemia)    He will continue pravastatin.  It is working well.  Condition controlled.         CAD (coronary artery disease) - Primary    History of stenting to left anterior descending artery 2017.  Lexiscan ordered today for Preoperative clearance for shoulder surgery.  He had a normal ejection fraction in  2017.  His ejection fraction now is 30%.    Despite maintaining sinus rhythm for the past couple of months his ejection fraction has not improved.    Stress test ordered to assess for progression of coronary disease as cause for LV systolic dysfunction.         Relevant Orders    Nuclear Stress - Cardiology Interpreted    Aortic atherosclerosis    Condition unchanged.         Bifascicular block    Condition stable.         Pulmonary hypertension    Normal pressures on most recent echo 2025 February.         Chronic combined systolic and diastolic heart failure    He is not a candidate for Ace/Arb.  He uses midodrine for hypotension.  He is only tolerating Toprol 12.5 mg currently.  He is on diuretics.            Endocrine    T2DM (type 2 diabetes mellitus)    Amaryl to be continued.  Condition unchanged.            Other    Dizziness    I am concerned that his dizziness could be related to amiodarone therapy.    For now will be continued.               Lexiscan ordered.  Right shoulder surgery will be needed at some point.    There is in decision by the patient regarding the timing of his shoulder surgery.  If his stress test is negative for ischemia I would clear him.    I did tell him that his orthopedic surgery is elective and with all his medical problems including renal failure, anemia and LV systolic dysfunction do place him at higher than normal risk for his surgery.    He is still considering getting his ablation procedure done 1st.    Follow-up appointment made in July.         Zafar Rojas MD  04/17/2025   10:56 AM               [1]   Current Outpatient Medications:     ACCU-CHEK SOFTCLIX LANCETS Misc, TEST BLOOD SUGAR THREE TIMES DAILY, Disp: 300 each, Rfl: 3    albuterol (PROVENTIL) 2.5 mg /3 mL (0.083 %) nebulizer solution, Take 3 mLs (2.5 mg total) by nebulization every 6 (six) hours as needed for Wheezing or Shortness of Breath. Rescue, Disp: 60 each, Rfl: 6    alcohol swabs (DROPSAFE ALCOHOL PREP  PADS) PadM, Apply 1 each topically once daily., Disp: 500 each, Rfl: 3    allopurinoL (ZYLOPRIM) 100 MG tablet, Take 4 tablets (400 mg total) by mouth once daily., Disp: 360 tablet, Rfl: 4    amiodarone (PACERONE) 200 MG Tab, Take 1 tablet (200 mg total) by mouth once daily., Disp: 90 tablet, Rfl: 1    apixaban (ELIQUIS) 5 mg Tab, Take 1 tablet (5 mg total) by mouth 2 (two) times daily., Disp: 180 tablet, Rfl: 3    aspirin (ECOTRIN) 81 MG EC tablet, Take 81 mg by mouth once daily., Disp: , Rfl:     azelastine (ASTELIN) 137 mcg (0.1 %) nasal spray, 1 spray (137 mcg total) by Nasal route 2 (two) times daily., Disp: 30 mL, Rfl: 1    blood sugar diagnostic (ACCU-CHEK GUIDE TEST STRIPS) Strp, 1 each by Other route 3 (three) times daily. Test Blood Sugar, Disp: 300 strip, Rfl: 10    blood-glucose meter (ACCU-CHEK GUIDE GLUCOSE METER) Misc, Accucheck BID, Disp: 1 each, Rfl: 0    brimonidine 0.2% (ALPHAGAN) 0.2 % Drop, Place 1 drop into both eyes 2 (two) times a day., Disp: 60 mL, Rfl: 3    colchicine (COLCRYS) 0.6 mg tablet, Take 1 tablet (0.6 mg total) by mouth every other day., Disp: 45 tablet, Rfl: 3    ferrous sulfate (IRON) 325 mg (65 mg iron) Tab tablet, Take 1 tablet (325 mg total) by mouth every other day., Disp: 45 tablet, Rfl: 3    gabapentin (NEURONTIN) 100 MG capsule, Take 2 capsules (200 mg total) by mouth every evening., Disp: 60 capsule, Rfl: 11    glimepiride (AMARYL) 4 MG tablet, Take 1 tablet (4 mg total) by mouth daily with breakfast., Disp: , Rfl:     leuprolide acetate, 6 month, (LUPRON) 45 mg SyKt injection, Inject 45 mg into the muscle every 6 (six) months. for 4 doses, Disp: , Rfl:     meclizine (ANTIVERT) 25 mg tablet, Take 1 tablet (25 mg total) by mouth 3 (three) times daily as needed for Dizziness., Disp: 60 tablet, Rfl: 3    metoprolol succinate (TOPROL-XL) 25 MG 24 hr tablet, Take 0.5 tablets (12.5 mg total) by mouth once daily., Disp: 45 tablet, Rfl: 3    midodrine (PROAMATINE) 10 MG tablet,  Take 1 tablet (10 mg total) by mouth 3 (three) times daily with meals., Disp: 90 tablet, Rfl: 11    pantoprazole (PROTONIX) 40 MG tablet, TAKE 1 TABLET ONE TIME DAILY, Disp: 90 tablet, Rfl: 3    pravastatin (PRAVACHOL) 40 MG tablet, Take 1 tablet (40 mg total) by mouth once daily., Disp: 90 tablet, Rfl: 3    tamsulosin (FLOMAX) 0.4 mg Cap, Take 1 capsule (0.4 mg total) by mouth once daily., Disp: 90 capsule, Rfl: 3    vit A/C/E ac/ZnOx/cupric oxide (EYE VITAMIN AND MINERALS ORAL), Take 1 tablet by mouth every morning., Disp: , Rfl:     Current Facility-Administered Medications:     leuprolide acetate (6 month) injection 45 mg, 45 mg, Intramuscular, Q6 Months, , 45 mg at 02/21/25 0955    leuprolide acetate (6 month) injection 45 mg, 45 mg, Intramuscular, Q6 Months,     Facility-Administered Medications Ordered in Other Visits:     sodium chloride 0.9% in 1,000 mL infusion, , Intravenous, Continuous, Order, Paper

## 2025-04-17 NOTE — ASSESSMENT & PLAN NOTE
He is not a candidate for Ace/Arb.  He uses midodrine for hypotension.  He is only tolerating Toprol 12.5 mg currently.  He is on diuretics.

## 2025-04-17 NOTE — TELEPHONE ENCOUNTER
Patient was schedule for a pre-op clearance on today, I saw a note about virtual visit, this visit can not be virtual patient must be seen and cleared. We can call all orders in.

## 2025-04-17 NOTE — PROGRESS NOTES
Patient received dose 7/10 of Venofer 100 mg, given slow IVP over 5 minutes. Patient observed 30 minutes post infusion. Patient tolerated well.    Next appointment scheduled and patient made aware.    Limited head-to-toe assessment due to privacy issues and visit reason though the opportunity was given for patient to express any concerns

## 2025-04-17 NOTE — ASSESSMENT & PLAN NOTE
History of stenting to left anterior descending artery 2017.  Lexiscan ordered today for Preoperative clearance for shoulder surgery.  He had a normal ejection fraction in 2017.  His ejection fraction now is 30%.    Despite maintaining sinus rhythm for the past couple of months his ejection fraction has not improved.    Stress test ordered to assess for progression of coronary disease as cause for LV systolic dysfunction.

## 2025-04-17 NOTE — TELEPHONE ENCOUNTER
----- Message from Tripp Mohan MD sent at 4/17/2025 10:47 AM CDT -----  Regarding: Reschedule  Patient was scheduled for Pre-Op today. Had called requesting if they could check in approx 30 minutes late. We are now approaching 1 hour overdue. Unfortunately he will need to reschedule. It appears his surgery is scheduled for 4/29, so this will need to get done very soon. Please schedule him with an available provider to accomplish this ASAP. Due to his medical history, I do recommend an in-person clearance appointment where EKG, CXR, UA, and pre-op labs may be accomplished. Thanks.

## 2025-04-17 NOTE — TELEPHONE ENCOUNTER
----- Message from Soco sent at 4/17/2025  9:56 AM CDT -----  Contact: [phone] Patient  .[email or web address] Patient is calling for Medical Advice regarding: Pt is calling in regards to his appointment that he has today 04/17/2025 at 10 AM. Pt is not going to make it and is asking if he can do a virtual appointment? Please call and advise. Thank youHow long has patient had these symptoms:N/APharmacy name and phone#:N/APatient wants a call back or thru [facility]:Comments:Please advise patient replies from provider may take up to 48 hours.

## 2025-04-17 NOTE — TELEPHONE ENCOUNTER
Called patient to schedule EUS for panc head cyst. Patient's daughter stated that Cardiology denied cardiac clearance today for an ortho procedure. Stress test ordered for 4/28/25 to reevaluate. Reminder set to call after stress test to discuss future Endoscopy appointment.

## 2025-04-17 NOTE — ASSESSMENT & PLAN NOTE
I am concerned that his dizziness could be related to amiodarone therapy.    For now will be continued.

## 2025-04-19 ENCOUNTER — RESULTS FOLLOW-UP (OUTPATIENT)
Dept: RHEUMATOLOGY | Facility: CLINIC | Age: 75
End: 2025-04-19

## 2025-04-21 ENCOUNTER — PATIENT MESSAGE (OUTPATIENT)
Dept: ELECTROPHYSIOLOGY | Facility: CLINIC | Age: 75
End: 2025-04-21
Payer: MEDICARE

## 2025-04-21 RX ORDER — GABAPENTIN 100 MG/1
200 CAPSULE ORAL NIGHTLY
Qty: 60 CAPSULE | Refills: 3 | Status: ON HOLD | OUTPATIENT
Start: 2025-04-21 | End: 2026-04-21

## 2025-04-21 RX ORDER — ALLOPURINOL 100 MG/1
400 TABLET ORAL DAILY
Qty: 360 TABLET | Refills: 0 | Status: ON HOLD | OUTPATIENT
Start: 2025-04-21

## 2025-04-22 DIAGNOSIS — I50.42 CHRONIC COMBINED SYSTOLIC AND DIASTOLIC HEART FAILURE: Primary | ICD-10-CM

## 2025-04-22 DIAGNOSIS — I25.10 CORONARY ARTERY DISEASE INVOLVING NATIVE CORONARY ARTERY OF NATIVE HEART WITHOUT ANGINA PECTORIS: ICD-10-CM

## 2025-04-23 ENCOUNTER — TELEPHONE (OUTPATIENT)
Dept: SPORTS MEDICINE | Facility: CLINIC | Age: 75
End: 2025-04-23
Payer: MEDICARE

## 2025-04-23 ENCOUNTER — TELEPHONE (OUTPATIENT)
Dept: NEPHROLOGY | Facility: CLINIC | Age: 75
End: 2025-04-23
Payer: MEDICARE

## 2025-04-23 DIAGNOSIS — I50.42 CHRONIC COMBINED SYSTOLIC AND DIASTOLIC HEART FAILURE: ICD-10-CM

## 2025-04-23 DIAGNOSIS — I25.10 CORONARY ARTERY DISEASE INVOLVING NATIVE CORONARY ARTERY OF NATIVE HEART WITHOUT ANGINA PECTORIS: Primary | ICD-10-CM

## 2025-04-23 NOTE — TELEPHONE ENCOUNTER
Called and spoke to daughter.  Patient was not cleared by cardiology for his 4/29/25 surgery and needs to cancel.

## 2025-04-23 NOTE — TELEPHONE ENCOUNTER
----- Message from Tad sent at 4/23/2025  9:27 AM CDT -----  Regarding: Appt  Contact: Karin @[phone]  Karin/daughter is calling to cancel surgery for 4/29, pt was not cleared by cardiologist, please call Karin @[phone]

## 2025-04-24 ENCOUNTER — RESULTS FOLLOW-UP (OUTPATIENT)
Dept: PRIMARY CARE CLINIC | Facility: CLINIC | Age: 75
End: 2025-04-24

## 2025-04-24 ENCOUNTER — DOCUMENT SCAN (OUTPATIENT)
Dept: HOME HEALTH SERVICES | Facility: HOSPITAL | Age: 75
End: 2025-04-24
Payer: MEDICARE

## 2025-04-24 PROBLEM — R79.89 ELEVATED TROPONIN: Status: ACTIVE | Noted: 2025-04-24

## 2025-04-24 PROBLEM — E87.20 LACTIC ACIDOSIS: Status: ACTIVE | Noted: 2025-04-24

## 2025-04-24 PROBLEM — I47.19 ATRIAL PAROXYSMAL TACHYCARDIA: Status: ACTIVE | Noted: 2025-04-24

## 2025-04-24 PROBLEM — E87.20 LACTIC ACIDOSIS: Status: RESOLVED | Noted: 2025-04-24 | Resolved: 2025-04-24

## 2025-04-24 PROBLEM — R65.21 SEPTIC SHOCK: Status: ACTIVE | Noted: 2025-04-24

## 2025-04-24 PROBLEM — A41.9 SEPTIC SHOCK: Status: ACTIVE | Noted: 2025-04-24

## 2025-04-25 DIAGNOSIS — I48.0 PAF (PAROXYSMAL ATRIAL FIBRILLATION): Primary | ICD-10-CM

## 2025-04-25 PROBLEM — R78.81 BACTEREMIA: Status: ACTIVE | Noted: 2025-04-25

## 2025-04-28 ENCOUNTER — EXTERNAL HOME HEALTH (OUTPATIENT)
Dept: HOME HEALTH SERVICES | Facility: HOSPITAL | Age: 75
End: 2025-04-28
Payer: MEDICARE

## 2025-04-28 PROBLEM — A04.5 CAMPYLOBACTER DIARRHEA: Status: ACTIVE | Noted: 2025-04-28

## 2025-04-29 ENCOUNTER — PATIENT OUTREACH (OUTPATIENT)
Dept: ADMINISTRATIVE | Facility: CLINIC | Age: 75
End: 2025-04-29
Payer: MEDICARE

## 2025-04-29 NOTE — PROGRESS NOTES
C3 nurse spoke with patient who is unable to complete the call at this time. Patient Requested a Callback. Patient does not have a HOSFU. Non[facility] PCP.

## 2025-05-01 ENCOUNTER — TELEPHONE (OUTPATIENT)
Dept: INFECTIOUS DISEASES | Facility: HOSPITAL | Age: 75
End: 2025-05-01
Payer: MEDICARE

## 2025-05-01 ENCOUNTER — HOSPITAL ENCOUNTER (OUTPATIENT)
Dept: CARDIOLOGY | Facility: HOSPITAL | Age: 75
Discharge: HOME | End: 2025-05-01
Attending: INTERNAL MEDICINE
Payer: MEDICARE

## 2025-05-01 VITALS
BODY MASS INDEX: 19.76 KG/M2 | WEIGHT: 154 LBS | HEIGHT: 74 IN | DIASTOLIC BLOOD PRESSURE: 75 MMHG | HEART RATE: 62 BPM | SYSTOLIC BLOOD PRESSURE: 143 MMHG

## 2025-05-01 DIAGNOSIS — I50.42 CHRONIC COMBINED SYSTOLIC AND DIASTOLIC HEART FAILURE: ICD-10-CM

## 2025-05-01 DIAGNOSIS — I25.10 CORONARY ARTERY DISEASE INVOLVING NATIVE CORONARY ARTERY OF NATIVE HEART WITHOUT ANGINA PECTORIS: ICD-10-CM

## 2025-05-01 LAB
CV PHARM DOSE: 0.4 MG
CV STRESS BASE HR: 62 BPM
DIASTOLIC BLOOD PRESSURE: 75 MMHG
EJECTION FRACTION- HIGH: 59 %
END DIASTOLIC INDEX-HIGH: 155 ML/M2
END DIASTOLIC INDEX-LOW: 91 ML/M2
END SYSTOLIC INDEX-HIGH: 78 ML/M2
END SYSTOLIC INDEX-LOW: 40 ML/M2
NUC REST DIASTOLIC VOLUME INDEX: 186
NUC REST EJECTION FRACTION: 49
NUC REST SYSTOLIC VOLUME INDEX: 95
NUC STRESS DIASTOLIC VOLUME INDEX: 171
NUC STRESS EJECTION FRACTION: 52 %
NUC STRESS SYSTOLIC VOLUME INDEX: 82
OHS CV CPX 1 MINUTE RECOVERY HEART RATE: 66 BPM
OHS CV CPX 85 PERCENT MAX PREDICTED HEART RATE MALE: 122
OHS CV CPX MAX PREDICTED HEART RATE: 144
OHS CV CPX PATIENT IS FEMALE: 0
OHS CV CPX PATIENT IS MALE: 1
OHS CV CPX PEAK DIASTOLIC BLOOD PRESSURE: 77 MMHG
OHS CV CPX PEAK HEAR RATE: 62 BPM
OHS CV CPX PEAK RATE PRESSURE PRODUCT: 8742
OHS CV CPX PEAK SYSTOLIC BLOOD PRESSURE: 141 MMHG
OHS CV CPX PERCENT MAX PREDICTED HEART RATE ACHIEVED: 43
OHS CV CPX RATE PRESSURE PRODUCT PRESENTING: 8866
OHS CV INITIAL DOSE: 10.8 MCG/KG/MIN
OHS CV PEAK DOSE: 31.1 MCG/KG/MIN
RETIRED EF AND QEF - SEE NOTES: 47 %
SYSTOLIC BLOOD PRESSURE: 143 MMHG

## 2025-05-01 PROCEDURE — 63600175 PHARM REV CODE 636 W HCPCS: Mod: HCNC | Performed by: INTERNAL MEDICINE

## 2025-05-01 PROCEDURE — 93016 CV STRESS TEST SUPVJ ONLY: CPT | Mod: HCNC,,, | Performed by: INTERNAL MEDICINE

## 2025-05-01 PROCEDURE — 78452 HT MUSCLE IMAGE SPECT MULT: CPT | Mod: HCNC

## 2025-05-01 PROCEDURE — 93018 CV STRESS TEST I&R ONLY: CPT | Mod: HCNC,,, | Performed by: INTERNAL MEDICINE

## 2025-05-01 PROCEDURE — A9502 TC99M TETROFOSMIN: HCPCS | Mod: HCNC | Performed by: INTERNAL MEDICINE

## 2025-05-01 PROCEDURE — 78452 HT MUSCLE IMAGE SPECT MULT: CPT | Mod: 26,HCNC,, | Performed by: INTERNAL MEDICINE

## 2025-05-01 RX ORDER — REGADENOSON 0.08 MG/ML
0.4 INJECTION, SOLUTION INTRAVENOUS
Status: COMPLETED | OUTPATIENT
Start: 2025-05-01 | End: 2025-05-01

## 2025-05-01 RX ADMIN — TETROFOSMIN 10.8 MILLICURIE: 1.38 INJECTION, POWDER, LYOPHILIZED, FOR SOLUTION INTRAVENOUS at 07:05

## 2025-05-01 RX ADMIN — TETROFOSMIN 31.1 MILLICURIE: 1.38 INJECTION, POWDER, LYOPHILIZED, FOR SOLUTION INTRAVENOUS at 08:05

## 2025-05-01 RX ADMIN — REGADENOSON 0.4 MG: 0.08 INJECTION, SOLUTION INTRAVENOUS at 08:05

## 2025-05-05 ENCOUNTER — TELEPHONE (OUTPATIENT)
Dept: INFECTIOUS DISEASES | Facility: HOSPITAL | Age: 75
End: 2025-05-05
Payer: MEDICARE

## 2025-05-05 NOTE — TELEPHONE ENCOUNTER
I spoke with  Zoltan about scheduling the last 3 infusions for iron. He states that he his doing bad and would not like to receive the rest of his appointments.

## 2025-05-07 ENCOUNTER — PATIENT MESSAGE (OUTPATIENT)
Dept: CARDIOLOGY | Facility: CLINIC | Age: 75
End: 2025-05-07
Payer: MEDICARE

## 2025-05-07 ENCOUNTER — RESULTS FOLLOW-UP (OUTPATIENT)
Dept: RHEUMATOLOGY | Facility: CLINIC | Age: 75
End: 2025-05-07

## 2025-05-12 ENCOUNTER — TELEPHONE (OUTPATIENT)
Dept: ENDOSCOPY | Facility: HOSPITAL | Age: 75
End: 2025-05-12
Payer: MEDICARE

## 2025-05-12 DIAGNOSIS — K86.2 PANCREAS CYST: Primary | ICD-10-CM

## 2025-05-12 NOTE — TELEPHONE ENCOUNTER
Patient is scheduled for a Upper Endoscopy Ultrasound (EUS) on 7/1/25 with Dr. VIDYA Tejeda  Referral for procedure from [facility]

## 2025-05-12 NOTE — TELEPHONE ENCOUNTER
Dear Dr. Rojas,    Patient has a scheduled procedure Upper Endoscopy Ultrasound (EUS) on 7/1/25 and is currently taking a blood thinner. In order to ensure patient safety, we would like to confirm that the patient can place their blood thinner medication on hold for the procedure. Can he/she discontinue Eliquis (apixaban) for a minimum of 2 days prior to the procedure?     Thank you for your prompt reply.    [facility] Endoscopy Scheduling

## 2025-05-14 RX ORDER — GABAPENTIN 100 MG/1
200 CAPSULE ORAL NIGHTLY
Qty: 60 CAPSULE | Refills: 3 | Status: ON HOLD | OUTPATIENT
Start: 2025-05-14 | End: 2026-05-14

## 2025-05-15 RX ORDER — GABAPENTIN 100 MG/1
200 CAPSULE ORAL NIGHTLY
Qty: 60 CAPSULE | Refills: 3 | OUTPATIENT
Start: 2025-05-15 | End: 2026-05-15

## 2025-05-16 ENCOUNTER — HOSPITAL ENCOUNTER (OUTPATIENT)
Facility: HOSPITAL | Age: 75
Discharge: HOME | End: 2025-05-18
Attending: EMERGENCY MEDICINE
Payer: MEDICARE

## 2025-05-16 DIAGNOSIS — E03.9 ACQUIRED HYPOTHYROIDISM: ICD-10-CM

## 2025-05-16 DIAGNOSIS — D63.1 ANEMIA DUE TO STAGE 5 CHRONIC KIDNEY DISEASE, NOT ON CHRONIC DIALYSIS: ICD-10-CM

## 2025-05-16 DIAGNOSIS — N18.4 CKD (CHRONIC KIDNEY DISEASE), STAGE IV: ICD-10-CM

## 2025-05-16 DIAGNOSIS — I50.42 CHRONIC COMBINED SYSTOLIC AND DIASTOLIC HEART FAILURE: ICD-10-CM

## 2025-05-16 DIAGNOSIS — E87.5 HYPERKALEMIA: Primary | ICD-10-CM

## 2025-05-16 DIAGNOSIS — R00.1 BRADYCARDIA: ICD-10-CM

## 2025-05-16 DIAGNOSIS — R07.9 CHEST PAIN: ICD-10-CM

## 2025-05-16 DIAGNOSIS — I12.0 HYPERTENSIVE CHRONIC KIDNEY DISEASE WITH STAGE 5 CHRONIC KIDNEY DISEASE OR END STAGE RENAL DISEASE: ICD-10-CM

## 2025-05-16 DIAGNOSIS — D64.9 ANEMIA, UNSPECIFIED TYPE: ICD-10-CM

## 2025-05-16 DIAGNOSIS — N18.5 ANEMIA DUE TO STAGE 5 CHRONIC KIDNEY DISEASE, NOT ON CHRONIC DIALYSIS: ICD-10-CM

## 2025-05-16 PROBLEM — N18.6 END-STAGE RENAL DISEASE (ESRD): Status: ACTIVE | Noted: 2025-05-16

## 2025-05-16 LAB
ABO + RH BLD: NORMAL
ABSOLUTE EOSINOPHIL (OHS): 0.15 K/UL
ABSOLUTE MONOCYTE (OHS): 0.36 K/UL (ref 0.3–1)
ABSOLUTE NEUTROPHIL COUNT (OHS): 4.01 K/UL (ref 1.8–7.7)
ALBUMIN SERPL BCP-MCNC: 2.5 G/DL (ref 3.5–5.2)
ALP SERPL-CCNC: 102 UNIT/L (ref 40–150)
ALT SERPL W/O P-5'-P-CCNC: 19 UNIT/L (ref 10–44)
ANION GAP (OHS): 10 MMOL/L (ref 8–16)
ANION GAP (OHS): 11 MMOL/L (ref 8–16)
ANION GAP (OHS): 11 MMOL/L (ref 8–16)
AST SERPL-CCNC: 21 UNIT/L (ref 11–45)
BASOPHILS # BLD AUTO: 0.02 K/UL
BASOPHILS NFR BLD AUTO: 0.4 %
BILIRUB SERPL-MCNC: 0.4 MG/DL (ref 0.1–1)
BILIRUB UR QL STRIP.AUTO: NEGATIVE
BLD PROD TYP BPU: NORMAL
BLOOD UNIT EXPIRATION DATE: NORMAL
BLOOD UNIT TYPE CODE: 6200
BUN SERPL-MCNC: 73 MG/DL (ref 8–23)
BUN SERPL-MCNC: 78 MG/DL (ref 6–30)
BUN SERPL-MCNC: 82 MG/DL (ref 8–23)
BUN SERPL-MCNC: 83 MG/DL (ref 8–23)
CALCIUM SERPL-MCNC: 7.1 MG/DL (ref 8.7–10.5)
CALCIUM SERPL-MCNC: 7.8 MG/DL (ref 8.7–10.5)
CALCIUM SERPL-MCNC: 8 MG/DL (ref 8.7–10.5)
CHLORIDE SERPL-SCNC: 112 MMOL/L (ref 95–110)
CHLORIDE SERPL-SCNC: 115 MMOL/L (ref 95–110)
CHLORIDE SERPL-SCNC: 115 MMOL/L (ref 95–110)
CHLORIDE SERPL-SCNC: 119 MMOL/L (ref 95–110)
CLARITY UR: CLEAR
CO2 SERPL-SCNC: 16 MMOL/L (ref 23–29)
CO2 SERPL-SCNC: 17 MMOL/L (ref 23–29)
CO2 SERPL-SCNC: 18 MMOL/L (ref 23–29)
COLOR UR AUTO: COLORLESS
CREAT SERPL-MCNC: 5.7 MG/DL (ref 0.5–1.4)
CREAT SERPL-MCNC: 6.2 MG/DL (ref 0.5–1.4)
CREAT SERPL-MCNC: 6.8 MG/DL (ref 0.5–1.4)
CREAT SERPL-MCNC: 7.7 MG/DL (ref 0.5–1.4)
CREAT UR-MCNC: 41 MG/DL (ref 23–375)
CROSSMATCH INTERPRETATION: NORMAL
DISPENSE STATUS: NORMAL
ERYTHROCYTE [DISTWIDTH] IN BLOOD BY AUTOMATED COUNT: 20 % (ref 11.5–14.5)
GFR SERPLBLD CREATININE-BSD FMLA CKD-EPI: 10 ML/MIN/1.73/M2
GFR SERPLBLD CREATININE-BSD FMLA CKD-EPI: 8 ML/MIN/1.73/M2
GFR SERPLBLD CREATININE-BSD FMLA CKD-EPI: 9 ML/MIN/1.73/M2
GLUCOSE SERPL-MCNC: 63 MG/DL (ref 70–110)
GLUCOSE SERPL-MCNC: 71 MG/DL (ref 70–110)
GLUCOSE SERPL-MCNC: 96 MG/DL (ref 70–110)
GLUCOSE SERPL-MCNC: 97 MG/DL (ref 70–110)
GLUCOSE UR QL STRIP: NEGATIVE
HCT VFR BLD AUTO: 22.5 % (ref 40–54)
HCT VFR BLD CALC: 23 %PCV (ref 36–54)
HGB BLD-MCNC: 6.7 GM/DL (ref 14–18)
HGB UR QL STRIP: NEGATIVE
HOLD SPECIMEN: NORMAL
IMM GRANULOCYTES # BLD AUTO: 0.03 K/UL (ref 0–0.04)
IMM GRANULOCYTES NFR BLD AUTO: 0.5 % (ref 0–0.5)
INDIRECT COOMBS: NORMAL
KETONES UR QL STRIP: NEGATIVE
LEUKOCYTE ESTERASE UR QL STRIP: NEGATIVE
LYMPHOCYTES # BLD AUTO: 1.09 K/UL (ref 1–4.8)
MAGNESIUM SERPL-MCNC: 1.4 MG/DL (ref 1.6–2.6)
MAGNESIUM SERPL-MCNC: 1.4 MG/DL (ref 1.6–2.6)
MCH RBC QN AUTO: 29.6 PG (ref 27–31)
MCHC RBC AUTO-ENTMCNC: 29.8 G/DL (ref 32–36)
MCV RBC AUTO: 100 FL (ref 82–98)
NITRITE UR QL STRIP: NEGATIVE
NUCLEATED RBC (/100WBC) (OHS): 0 /100 WBC
OHS QRS DURATION: 124 MS
OHS QTC CALCULATION: 487 MS
OSMOLALITY UR: 347 MOSM/KG (ref 50–1200)
PH UR STRIP: 6 [PH]
PHOSPHATE SERPL-MCNC: 6.1 MG/DL (ref 2.7–4.5)
PLATELET # BLD AUTO: 123 K/UL (ref 150–450)
PMV BLD AUTO: 9.5 FL (ref 9.2–12.9)
POC IONIZED CALCIUM: 1.1 MMOL/L (ref 1.06–1.42)
POC TCO2 (MEASURED): 17 MMOL/L (ref 23–29)
POCT GLUCOSE: 219 MG/DL (ref 70–110)
POCT GLUCOSE: 222 MG/DL (ref 70–110)
POCT GLUCOSE: 79 MG/DL (ref 70–110)
POTASSIUM BLD-SCNC: 6 MMOL/L (ref 3.5–5.1)
POTASSIUM SERPL-SCNC: 4.5 MMOL/L (ref 3.5–5.1)
POTASSIUM SERPL-SCNC: 5 MMOL/L (ref 3.5–5.1)
POTASSIUM SERPL-SCNC: 6.1 MMOL/L (ref 3.5–5.1)
PROT SERPL-MCNC: 5.4 GM/DL (ref 6–8.4)
PROT UR QL STRIP: NEGATIVE
PTH-INTACT SERPL-MCNC: 213.4 PG/ML (ref 9–77)
RBC # BLD AUTO: 2.26 M/UL (ref 4.6–6.2)
RELATIVE EOSINOPHIL (OHS): 2.7 %
RELATIVE LYMPHOCYTE (OHS): 19.3 % (ref 18–48)
RELATIVE MONOCYTE (OHS): 6.4 % (ref 4–15)
RELATIVE NEUTROPHIL (OHS): 70.7 % (ref 38–73)
RH BLD: NORMAL
SAMPLE: ABNORMAL
SODIUM BLD-SCNC: 142 MMOL/L (ref 136–145)
SODIUM SERPL-SCNC: 143 MMOL/L (ref 136–145)
SODIUM SERPL-SCNC: 144 MMOL/L (ref 136–145)
SODIUM SERPL-SCNC: 145 MMOL/L (ref 136–145)
SODIUM UR-SCNC: 51 MMOL/L (ref 20–250)
SP GR UR STRIP: 1.01
SPECIMEN OUTDATE: NORMAL
UNIT NUMBER: NORMAL
UROBILINOGEN UR STRIP-ACNC: NEGATIVE EU/DL
UUN UR-MCNC: 327 MG/DL (ref 140–1050)
WBC # BLD AUTO: 5.66 K/UL (ref 3.9–12.7)

## 2025-05-16 PROCEDURE — 25000003 PHARM REV CODE 250: Mod: HCNC

## 2025-05-16 PROCEDURE — 80053 COMPREHEN METABOLIC PANEL: CPT | Mod: HCNC

## 2025-05-16 PROCEDURE — 81003 URINALYSIS AUTO W/O SCOPE: CPT | Mod: HCNC

## 2025-05-16 PROCEDURE — 83735 ASSAY OF MAGNESIUM: CPT | Mod: HCNC

## 2025-05-16 PROCEDURE — 82962 GLUCOSE BLOOD TEST: CPT | Mod: HCNC

## 2025-05-16 PROCEDURE — 80047 BASIC METABLC PNL IONIZED CA: CPT | Mod: HCNC

## 2025-05-16 PROCEDURE — 93005 ELECTROCARDIOGRAM TRACING: CPT | Mod: HCNC

## 2025-05-16 PROCEDURE — P9016 RBC LEUKOCYTES REDUCED: HCPCS | Mod: HCNC

## 2025-05-16 PROCEDURE — 93010 ELECTROCARDIOGRAM REPORT: CPT | Mod: HCNC,,, | Performed by: INTERNAL MEDICINE

## 2025-05-16 PROCEDURE — G0378 HOSPITAL OBSERVATION PER HR: HCPCS | Mod: HCNC

## 2025-05-16 PROCEDURE — 25000242 PHARM REV CODE 250 ALT 637 W/ HCPCS: Mod: HCNC

## 2025-05-16 PROCEDURE — 82570 ASSAY OF URINE CREATININE: CPT | Mod: HCNC

## 2025-05-16 PROCEDURE — 86850 RBC ANTIBODY SCREEN: CPT | Mod: HCNC

## 2025-05-16 PROCEDURE — 84540 ASSAY OF URINE/UREA-N: CPT | Mod: HCNC

## 2025-05-16 PROCEDURE — 63600175 PHARM REV CODE 636 W HCPCS: Mod: HCNC

## 2025-05-16 PROCEDURE — 82310 ASSAY OF CALCIUM: CPT | Mod: HCNC

## 2025-05-16 PROCEDURE — 96365 THER/PROPH/DIAG IV INF INIT: CPT | Mod: HCNC

## 2025-05-16 PROCEDURE — 36430 TRANSFUSION BLD/BLD COMPNT: CPT | Mod: HCNC

## 2025-05-16 PROCEDURE — 94640 AIRWAY INHALATION TREATMENT: CPT | Mod: HCNC

## 2025-05-16 PROCEDURE — 99291 CRITICAL CARE FIRST HOUR: CPT | Mod: HCNC

## 2025-05-16 PROCEDURE — 94761 N-INVAS EAR/PLS OXIMETRY MLT: CPT | Mod: HCNC

## 2025-05-16 PROCEDURE — 85025 COMPLETE CBC W/AUTO DIFF WBC: CPT | Mod: HCNC

## 2025-05-16 PROCEDURE — 86920 COMPATIBILITY TEST SPIN: CPT | Mod: HCNC

## 2025-05-16 PROCEDURE — 99214 OFFICE O/P EST MOD 30 MIN: CPT | Mod: HCNC,GC,, | Performed by: INTERNAL MEDICINE

## 2025-05-16 PROCEDURE — 84100 ASSAY OF PHOSPHORUS: CPT | Mod: HCNC

## 2025-05-16 PROCEDURE — 83935 ASSAY OF URINE OSMOLALITY: CPT | Mod: HCNC

## 2025-05-16 PROCEDURE — 83735 ASSAY OF MAGNESIUM: CPT | Mod: HCNC,91 | Performed by: EMERGENCY MEDICINE

## 2025-05-16 PROCEDURE — 84300 ASSAY OF URINE SODIUM: CPT | Mod: HCNC

## 2025-05-16 PROCEDURE — 96375 TX/PRO/DX INJ NEW DRUG ADDON: CPT | Mod: HCNC

## 2025-05-16 PROCEDURE — 96376 TX/PRO/DX INJ SAME DRUG ADON: CPT | Mod: HCNC

## 2025-05-16 PROCEDURE — 83970 ASSAY OF PARATHORMONE: CPT | Mod: HCNC | Performed by: STUDENT IN AN ORGANIZED HEALTH CARE EDUCATION/TRAINING PROGRAM

## 2025-05-16 PROCEDURE — 82310 ASSAY OF CALCIUM: CPT | Mod: 91,HCNC

## 2025-05-16 RX ORDER — IBUPROFEN 200 MG
16 TABLET ORAL
Status: DISCONTINUED | OUTPATIENT
Start: 2025-05-16 | End: 2025-05-18 | Stop reason: HOSPADM

## 2025-05-16 RX ORDER — GLUCAGON 1 MG
1 KIT INJECTION
Status: DISCONTINUED | OUTPATIENT
Start: 2025-05-16 | End: 2025-05-18 | Stop reason: HOSPADM

## 2025-05-16 RX ORDER — SODIUM CHLORIDE 0.9 % (FLUSH) 0.9 %
5 SYRINGE (ML) INJECTION
Status: DISCONTINUED | OUTPATIENT
Start: 2025-05-16 | End: 2025-05-18 | Stop reason: HOSPADM

## 2025-05-16 RX ORDER — FUROSEMIDE 10 MG/ML
80 INJECTION INTRAMUSCULAR; INTRAVENOUS
Status: COMPLETED | OUTPATIENT
Start: 2025-05-16 | End: 2025-05-16

## 2025-05-16 RX ORDER — ASPIRIN 81 MG/1
81 TABLET ORAL DAILY
Status: DISCONTINUED | OUTPATIENT
Start: 2025-05-16 | End: 2025-05-18 | Stop reason: HOSPADM

## 2025-05-16 RX ORDER — PRAVASTATIN SODIUM 40 MG/1
40 TABLET ORAL DAILY
Status: DISCONTINUED | OUTPATIENT
Start: 2025-05-16 | End: 2025-05-18 | Stop reason: HOSPADM

## 2025-05-16 RX ORDER — LANOLIN ALCOHOL/MO/W.PET/CERES
400 CREAM (GRAM) TOPICAL DAILY
Status: DISCONTINUED | OUTPATIENT
Start: 2025-05-16 | End: 2025-05-18 | Stop reason: HOSPADM

## 2025-05-16 RX ORDER — NALOXONE HCL 0.4 MG/ML
0.02 VIAL (ML) INJECTION
Status: DISCONTINUED | OUTPATIENT
Start: 2025-05-16 | End: 2025-05-18 | Stop reason: HOSPADM

## 2025-05-16 RX ORDER — MAGNESIUM SULFATE 1 G/100ML
1 INJECTION INTRAVENOUS ONCE
Status: COMPLETED | OUTPATIENT
Start: 2025-05-16 | End: 2025-05-16

## 2025-05-16 RX ORDER — BRIMONIDINE TARTRATE 2 MG/ML
1 SOLUTION/ DROPS OPHTHALMIC 2 TIMES DAILY
Status: DISCONTINUED | OUTPATIENT
Start: 2025-05-16 | End: 2025-05-18 | Stop reason: HOSPADM

## 2025-05-16 RX ORDER — ALLOPURINOL 300 MG/1
300 TABLET ORAL DAILY
Status: DISCONTINUED | OUTPATIENT
Start: 2025-05-16 | End: 2025-05-18 | Stop reason: HOSPADM

## 2025-05-16 RX ORDER — TALC
6 POWDER (GRAM) TOPICAL NIGHTLY PRN
Status: DISCONTINUED | OUTPATIENT
Start: 2025-05-16 | End: 2025-05-18 | Stop reason: HOSPADM

## 2025-05-16 RX ORDER — TAMSULOSIN HYDROCHLORIDE 0.4 MG/1
0.4 CAPSULE ORAL DAILY
Status: DISCONTINUED | OUTPATIENT
Start: 2025-05-16 | End: 2025-05-18 | Stop reason: HOSPADM

## 2025-05-16 RX ORDER — PROCHLORPERAZINE EDISYLATE 5 MG/ML
5 INJECTION INTRAMUSCULAR; INTRAVENOUS EVERY 6 HOURS PRN
Status: DISCONTINUED | OUTPATIENT
Start: 2025-05-16 | End: 2025-05-18 | Stop reason: HOSPADM

## 2025-05-16 RX ORDER — ALUMINUM HYDROXIDE, MAGNESIUM HYDROXIDE, AND SIMETHICONE 1200; 120; 1200 MG/30ML; MG/30ML; MG/30ML
30 SUSPENSION ORAL 4 TIMES DAILY PRN
Status: DISCONTINUED | OUTPATIENT
Start: 2025-05-16 | End: 2025-05-18 | Stop reason: HOSPADM

## 2025-05-16 RX ORDER — CALCIUM GLUCONATE 20 MG/ML
1 INJECTION, SOLUTION INTRAVENOUS
Status: COMPLETED | OUTPATIENT
Start: 2025-05-16 | End: 2025-05-16

## 2025-05-16 RX ORDER — INSULIN ASPART 100 [IU]/ML
0-5 INJECTION, SOLUTION INTRAVENOUS; SUBCUTANEOUS
Status: DISCONTINUED | OUTPATIENT
Start: 2025-05-16 | End: 2025-05-18 | Stop reason: HOSPADM

## 2025-05-16 RX ORDER — LEVOTHYROXINE SODIUM 25 UG/1
25 TABLET ORAL
Status: DISCONTINUED | OUTPATIENT
Start: 2025-05-17 | End: 2025-05-18 | Stop reason: HOSPADM

## 2025-05-16 RX ORDER — MIDODRINE HYDROCHLORIDE 5 MG/1
10 TABLET ORAL
Status: DISCONTINUED | OUTPATIENT
Start: 2025-05-16 | End: 2025-05-18 | Stop reason: HOSPADM

## 2025-05-16 RX ORDER — HYDROCODONE BITARTRATE AND ACETAMINOPHEN 500; 5 MG/1; MG/1
TABLET ORAL
Status: DISCONTINUED | OUTPATIENT
Start: 2025-05-16 | End: 2025-05-18 | Stop reason: HOSPADM

## 2025-05-16 RX ORDER — ONDANSETRON 4 MG/1
8 TABLET, ORALLY DISINTEGRATING ORAL EVERY 8 HOURS PRN
Status: DISCONTINUED | OUTPATIENT
Start: 2025-05-16 | End: 2025-05-18 | Stop reason: HOSPADM

## 2025-05-16 RX ORDER — ACETAMINOPHEN 500 MG
1000 TABLET ORAL EVERY 8 HOURS PRN
Status: DISCONTINUED | OUTPATIENT
Start: 2025-05-16 | End: 2025-05-18 | Stop reason: HOSPADM

## 2025-05-16 RX ORDER — CALCIUM GLUCONATE 20 MG/ML
1 INJECTION, SOLUTION INTRAVENOUS EVERY 10 MIN PRN
Status: DISCONTINUED | OUTPATIENT
Start: 2025-05-16 | End: 2025-05-18 | Stop reason: HOSPADM

## 2025-05-16 RX ORDER — AMIODARONE HYDROCHLORIDE 200 MG/1
200 TABLET ORAL DAILY
Status: DISCONTINUED | OUTPATIENT
Start: 2025-05-16 | End: 2025-05-18 | Stop reason: HOSPADM

## 2025-05-16 RX ORDER — LANOLIN ALCOHOL/MO/W.PET/CERES
1 CREAM (GRAM) TOPICAL DAILY
Status: DISCONTINUED | OUTPATIENT
Start: 2025-05-16 | End: 2025-05-18 | Stop reason: HOSPADM

## 2025-05-16 RX ORDER — IPRATROPIUM BROMIDE AND ALBUTEROL SULFATE 2.5; .5 MG/3ML; MG/3ML
3 SOLUTION RESPIRATORY (INHALATION) EVERY 4 HOURS PRN
Status: DISCONTINUED | OUTPATIENT
Start: 2025-05-16 | End: 2025-05-18 | Stop reason: HOSPADM

## 2025-05-16 RX ORDER — FUROSEMIDE 10 MG/ML
80 INJECTION INTRAMUSCULAR; INTRAVENOUS ONCE
Status: COMPLETED | OUTPATIENT
Start: 2025-05-16 | End: 2025-05-16

## 2025-05-16 RX ORDER — POLYETHYLENE GLYCOL 3350 17 G/17G
17 POWDER, FOR SOLUTION ORAL 2 TIMES DAILY PRN
Status: DISCONTINUED | OUTPATIENT
Start: 2025-05-16 | End: 2025-05-18 | Stop reason: HOSPADM

## 2025-05-16 RX ORDER — IBUPROFEN 200 MG
24 TABLET ORAL
Status: DISCONTINUED | OUTPATIENT
Start: 2025-05-16 | End: 2025-05-18 | Stop reason: HOSPADM

## 2025-05-16 RX ORDER — GABAPENTIN 100 MG/1
200 CAPSULE ORAL 2 TIMES DAILY
Status: DISCONTINUED | OUTPATIENT
Start: 2025-05-16 | End: 2025-05-18 | Stop reason: HOSPADM

## 2025-05-16 RX ORDER — SODIUM BICARBONATE 650 MG/1
650 TABLET ORAL 2 TIMES DAILY
Status: DISCONTINUED | OUTPATIENT
Start: 2025-05-16 | End: 2025-05-18 | Stop reason: HOSPADM

## 2025-05-16 RX ORDER — SIMETHICONE 80 MG
1 TABLET,CHEWABLE ORAL 4 TIMES DAILY PRN
Status: DISCONTINUED | OUTPATIENT
Start: 2025-05-16 | End: 2025-05-18 | Stop reason: HOSPADM

## 2025-05-16 RX ORDER — ACETAMINOPHEN 325 MG/1
650 TABLET ORAL EVERY 4 HOURS PRN
Status: DISCONTINUED | OUTPATIENT
Start: 2025-05-16 | End: 2025-05-18 | Stop reason: HOSPADM

## 2025-05-16 RX ORDER — ALBUTEROL SULFATE 2.5 MG/.5ML
10 SOLUTION RESPIRATORY (INHALATION)
Status: COMPLETED | OUTPATIENT
Start: 2025-05-16 | End: 2025-05-16

## 2025-05-16 RX ADMIN — BRIMONIDINE TARTRATE 1 DROP: 2 SOLUTION OPHTHALMIC at 02:05

## 2025-05-16 RX ADMIN — SODIUM ZIRCONIUM CYCLOSILICATE 10 G: 10 POWDER, FOR SUSPENSION ORAL at 07:05

## 2025-05-16 RX ADMIN — APIXABAN 5 MG: 5 TABLET, FILM COATED ORAL at 08:05

## 2025-05-16 RX ADMIN — GABAPENTIN 200 MG: 100 CAPSULE ORAL at 08:05

## 2025-05-16 RX ADMIN — BRIMONIDINE TARTRATE 1 DROP: 2 SOLUTION OPHTHALMIC at 08:05

## 2025-05-16 RX ADMIN — ALBUTEROL SULFATE 10 MG: 2.5 SOLUTION RESPIRATORY (INHALATION) at 10:05

## 2025-05-16 RX ADMIN — SODIUM ZIRCONIUM CYCLOSILICATE 10 G: 10 POWDER, FOR SUSPENSION ORAL at 08:05

## 2025-05-16 RX ADMIN — MAGNESIUM SULFATE HEPTAHYDRATE 1 G: 500 INJECTION, SOLUTION INTRAMUSCULAR; INTRAVENOUS at 11:05

## 2025-05-16 RX ADMIN — INSULIN HUMAN 7.58 UNITS: 100 INJECTION, SOLUTION PARENTERAL at 10:05

## 2025-05-16 RX ADMIN — DEXTROSE MONOHYDRATE 50 G: 25 INJECTION, SOLUTION INTRAVENOUS at 10:05

## 2025-05-16 RX ADMIN — FUROSEMIDE 80 MG: 10 INJECTION, SOLUTION INTRAVENOUS at 09:05

## 2025-05-16 RX ADMIN — CALCIUM GLUCONATE 1 G: 20 INJECTION, SOLUTION INTRAVENOUS at 10:05

## 2025-05-16 RX ADMIN — SODIUM BICARBONATE 650 MG: 650 TABLET ORAL at 08:05

## 2025-05-16 RX ADMIN — FUROSEMIDE 80 MG: 10 INJECTION, SOLUTION INTRAVENOUS at 05:05

## 2025-05-16 RX ADMIN — MIDODRINE HYDROCHLORIDE 10 MG: 5 TABLET ORAL at 04:05

## 2025-05-16 NOTE — HPI
Jarrett Charlton Jr. Is a 74 yo M with T2DM, HTN, HLD, CKDIV, DVT, pAF (on eliquis), Prostate CA s/p TURP, UC s/p ileostomy, GERD, gout, HFrEF (EF 30%), autonomic dysfunction, BPH, anemia, CAD, and recent campylobacter infection c/b bacteremia who presented to ED for abnormal labs. Patient went to his PCP on 05/13 and had OP lab work at that time. His K was found to be 6.3 and Hgb 6.9. Patient seen with daughter at bedside. Reports feeling well and still making urine. At baseline, he uses a walker to ambulate and lives with his daughter. He does endorse BLE edema. Denies melena, hematochezia, or hematemesis. States that he had been taking his midodrine 10 mg four times a day to avoid hypotensive episodes. Denies fever, chills, chest pain, SOB, cough, abdominal pain, n/v/d, dysuria.    In ED: Afebrile. Bradycardic with HR 40-50s. HDS. No leukocytosis. Hgb 6.7. Blood consent obtained. Transfused 1u pRBC. K 6.1, Cr 6.8, BUN 82, serum CO2 17. Phos 6.1. Mg 1.4, replaced. Corrected Ca wnl. CXR with improving R basilar opacity and possible small bilateral pleural effusions. EKG without peaked T waves. Given IV calcium gluconate and K shifted. Nephrology consulted. Admitted to .    Medication Refill    Medication needing refilled:  lisinopril    Dosage of the medication:  5 mg tablet    How are you taking this medication (QD, BID, TID, QID, PRN):  TAKE ONE TABLET BY MOUTH DAILY    30 or 90 day supply called in:  80    Which Pharmacy are we sending the medication to?:    [address] [place] [address] Drive [address], [facility] [address], [address] [place] [address]   Phone:  [phone]  Fax:  [phone]

## 2025-05-16 NOTE — ASSESSMENT & PLAN NOTE
Patient has Abnormal Magnesium: hypomagnesemia. Will continue to monitor electrolytes closely. Will replace the affected electrolytes and repeat labs to be done after interventions completed. The patient's magnesium results have been reviewed and are listed below.  Recent Labs   Lab 05/16/25  0955   MG 1.4*

## 2025-05-16 NOTE — ASSESSMENT & PLAN NOTE
Anemia is likely due to chronic disease due to Chronic Kidney Disease. Most recent hemoglobin and hematocrit are listed below.  Recent Labs     05/16/25  0904 05/16/25  0912   HGB 6.7*  --    HCT 22.5* 23*     Plan  - Monitor serial CBC: Daily  - Transfuse PRBC if patient becomes hemodynamically unstable, symptomatic or H/H drops below 7/21.  - Patient has received 1 units of PRBCs on 05/16  - Patient's anemia is currently stable

## 2025-05-16 NOTE — ASSESSMENT & PLAN NOTE
GRADY is likely due to unsure, will obtain renal studies. Possibly just worsening of his kidney function. Baseline creatinine is 4-5. Most recent creatinine and eGFR are listed below.  Recent Labs     05/16/25  0904   CREATININE 6.8*   EGFRNORACEVR 8*      Plan  - GRADY is worsening. Will adjust treatment as follows:    - Avoid nephrotoxins and renally dose meds for GFR listed above  - Monitor urine output, serial BMP, and adjust therapy as needed  - obtain renal studies  - Nephrology consulted; appreciate recs   - will hold on TDC for now since patient making adequate amount of urine   - will give additional dose of IV lasix this evening

## 2025-05-16 NOTE — ED PROVIDER NOTES
Encounter Date: 5/16/2025       History     Chief Complaint   Patient presents with    Abnormal Labs     Told by PCP to come to ED for possibly needing dialysis r/t decreased kidney function. Potassium 6.3 four days ago     Patient is a 75-year-old male with a past medical history of CAD, CKD, diabetes, DVT on Eliquis (takes consistently), GERD, HLD, AICD, CHF, AAA,, AFib, who arrives for evaluation of hyperkalemia and anemia.  Patient was evaluated by PCP on Wednesday during which labs were ordered and patient found to be hyperkalemic and anemic.  Patient has had recent IV infusion with iron within the past few weeks.  Patient states taking p.o. potassium pills at home including last dose this morning.  Patient denies any shortness of breath, chest pain, back pain, abdominal pain, infectious symptoms including fevers, chills, otalgia, rhinorrhea, sore throat, cough, nausea, vomiting, dysuria, hematuria.  Denies any diarrhea.  States having recurrent falls which he attributes to intermittent dizziness.  States having a outpatient appointment with neurology set up for further evaluation of intermittent dizziness.  States not feeling dizzy and also denies any acute blurred vision, numbness, tingling, weakness.          Review of patient's allergies indicates:   Allergen Reactions    Atorvastatin     Rosuvastatin      Past Medical History:   Diagnosis Date    Autonomic dysfunction 11/2023 11/2023 neuro note    Basal cell carcinoma     BPH (benign prostatic hyperplasia)     s/p TURP    CAD (coronary artery disease) 2017    status post PCI to mid LAD in 2017 (LHC in 2021 showed patent stent, otherwise nonobstructive CAD)    Cancer of prostate     s/p TURP    Chronic kidney disease     DDD (degenerative disc disease) 10/21/2013    Diabetes mellitus with renal complications     Disc disease, degenerative, cervical     DVT (deep venous thrombosis)     Encounter for blood transfusion     GERD (gastroesophageal reflux  disease)     History of ulcerative colitis 1982    s/p colectomy and ileostomy    HLD (hyperlipidemia)     Ileostomy in place 1982    RBBB     Squamous cell carcinoma 03/08/2018    Left superior helix near insertion    Squamous cell carcinoma 04/12/2018    Left forearm x 5    Ventricular tachycardia      Past Surgical History:   Procedure Laterality Date    ABLATION, SVT, ACCESSORY PATHWAY N/A 04/30/2024    Procedure: Ablation, SVT, Accessory Pathway;  Surgeon: Franky Taylor MD;  Location: [facility] EP LAB;  Service: Cardiology;  Laterality: N/A;  VT, RFA, Carto, MAC, GP, 3 Prep *MDT ILR in situ*    cardiac stents      CATARACT EXTRACTION Bilateral     CERVICAL FUSION      CHOLECYSTECTOMY N/A 02/14/2023    Procedure: CHOLECYSTECTOMY;  Surgeon: Mike Joyce MD;  Location: [facility] OR;  Service: General;  Laterality: N/A;  very high probabilty of converison to open    colectomy and ileostomy  1985    EGD, WITH CLOSED BIOPSY  04/04/2025    ENDOSCOPIC ULTRASOUND OF UPPER GASTROINTESTINAL TRACT N/A 02/10/2023    Procedure: ULTRASOUND, UPPER GI TRACT, ENDOSCOPIC;  Surgeon: Poli Fuller MD;  Location: [facility];  Service: Endoscopy;  Laterality: N/A;    ERCP N/A 02/10/2023    Procedure: ERCP (ENDOSCOPIC RETROGRADE CHOLANGIOPANCREATOGRAPHY);  Surgeon: Poli Fuller MD;  Location: [facility];  Service: Endoscopy;  Laterality: N/A;    ESOPHAGOGASTRODUODENOSCOPY N/A 4/4/2025    Procedure: EGD (ESOPHAGOGASTRODUODENOSCOPY);  Surgeon: Melania Gibson MD;  Location: [facility] ENDO;  Service: Endoscopy;  Laterality: N/A;    EXCISION OF PAROTID GLAND Left 12/18/2020    Procedure: EXCISION, PAROTID GLAND;  Surgeon: Michael Pinzon MD;  Location: [address] FLR;  Service: ENT;  Laterality: Left;    INSERTION OF IMPLANTABLE LOOP RECORDER  06/07/2021    INSERTION OF IMPLANTABLE LOOP RECORDER Left 06/07/2021    Procedure: INSERTION, IMPLANTABLE LOOP RECORDER;  Surgeon: Romario Dao MD;  Location: [facility] CATH LAB;  Service:  Cardiology;  Laterality: Left;    LEFT HEART CATHETERIZATION Right 04/15/2021    Procedure: CATHETERIZATION, HEART, LEFT;  Surgeon: Marcio Jones MD;  Location: [facility] CATH LAB;  Service: Cardiology;  Laterality: Right;    LYSIS OF ADHESIONS N/A 11/09/2020    Procedure: LYSIS, ADHESIONS,  ERAS low;  Surgeon: CED Haley MD;  Location: [facility] 2ND FLR;  Service: Colon and Rectal;  Laterality: N/A;    LYSIS OF ADHESIONS N/A 02/14/2023    Procedure: LYSIS, ADHESIONS;  Surgeon: Mike Joyce MD;  Location: [facility] OR;  Service: General;  Laterality: N/A;    POUCHOSCOPY N/A 04/06/2022    Procedure: ENDOSCOPY, POUCH, SMALL INTESTINE, DIAGNOSTIC;  Surgeon: Dieter Juarez MD;  Location: [facility] ENDO (2ND FLR);  Service: Endoscopy;  Laterality: N/A;    REPAIR, HERNIA, PARASTOMAL N/A 11/09/2020    Procedure: REPAIR, HERNIA, PARASTOMAL;  Surgeon: CED Haley MD;  Location: [facility] 2ND FLR;  Service: Colon and Rectal;  Laterality: N/A;    TRANSURETHRAL RESECTION OF PROSTATE (TURP) WITHOUT USE OF LASER N/A 01/23/2019    Procedure: TURP, WITHOUT USING LASER BIPOLAR;  Surgeon: Catarino Mota MD;  Location: [facility] 1ST FLR;  Service: Urology;  Laterality: N/A;  1.5 HOURS    TREATMENT OF CARDIAC ARRHYTHMIA  04/30/2024    Procedure: Cardioversion or Defibrillation;  Surgeon: Franky Taylor MD;  Location: [facility] EP LAB;  Service: Cardiology;;     Family History   Problem Relation Name Age of Onset    Dementia Mother      Cancer Father      Diabetes Father      Heart disease Father      Melanoma Neg Hx      Hypertension Neg Hx      Arthritis Neg Hx       Social History[1]  Review of Systems    Physical Exam     Initial Vitals [05/16/25 0756]   BP Pulse Resp Temp SpO2   128/63 (!) 44 14 98.2 °F (36.8 °C) 99 %      MAP       --         Physical Exam    Nursing note and vitals reviewed.  Constitutional: He appears well-developed and well-nourished. He is not diaphoretic. No distress.   HENT:   Head: Normocephalic and  atraumatic.   Right Ear: External ear normal.   Left Ear: External ear normal.   Nose: Nose normal.   Eyes: Conjunctivae are normal. Right eye exhibits no discharge. Left eye exhibits no discharge. No scleral icterus.   Neck:   No C-spine tenderness.   Normal range of motion.  Cardiovascular:  Normal rate, regular rhythm and normal heart sounds.           No murmur heard.  Pulmonary/Chest: Breath sounds normal. No respiratory distress. He has no wheezes. He has no rhonchi. He has no rales.   Abdominal: Abdomen is soft. He exhibits no distension. There is no abdominal tenderness. There is no rebound and no guarding.   Musculoskeletal:         General: Edema present. No tenderness. Normal range of motion.      Cervical back: Normal range of motion.     Neurological: He is alert and oriented to person, place, and time.   Skin: Capillary refill takes less than 2 seconds. No rash and no abscess noted. No erythema. No pallor.         ED Course   Procedures  Labs Reviewed   COMPREHENSIVE METABOLIC PANEL - Abnormal       Result Value    Sodium 143      Potassium 6.1 (*)     Chloride 115 (*)     CO2 17 (*)     Glucose 97      BUN 82 (*)     Creatinine 6.8 (*)     Calcium 7.8 (*)     Protein Total 5.4 (*)     Albumin 2.5 (*)     Bilirubin Total 0.4            AST 21      ALT 19      Anion Gap 11      eGFR 8 (*)    CBC WITH DIFFERENTIAL - Abnormal    WBC 5.66      RBC 2.26 (*)     HGB 6.7 (*)     HCT 22.5 (*)      (*)     MCH 29.6      MCHC 29.8 (*)     RDW 20.0 (*)     Platelet Count 123 (*)     MPV 9.5      Nucleated RBC 0      Neut % 70.7      Lymph % 19.3      Mono % 6.4      Eos % 2.7      Basophil % 0.4      Imm Grans % 0.5      Neut # 4.01      Lymph # 1.09      Mono # 0.36      Eos # 0.15      Baso # 0.02      Imm Grans # 0.03     URINALYSIS, REFLEX TO URINE CULTURE - Abnormal    Color, UA Colorless (*)     Appearance, UA Clear      pH, UA 6.0      Spec Grav UA 1.010      Protein, UA Negative       Glucose, UA Negative      Ketones, UA Negative      Bilirubin, UA Negative      Blood, UA Negative      Nitrites, UA Negative      Urobilinogen, UA Negative      Leukocyte Esterase, UA Negative     MAGNESIUM - Abnormal    Magnesium  1.4 (*)    PHOSPHORUS - Abnormal    Phosphorus Level 6.1 (*)    MAGNESIUM - Abnormal    Magnesium  1.4 (*)    BASIC METABOLIC PANEL - Abnormal    Sodium 145      Potassium 4.5      Chloride 119 (*)     CO2 16 (*)     Glucose 63 (*)     BUN 73 (*)     Creatinine 5.7 (*)     Calcium 7.1 (*)     Anion Gap 10      eGFR 10 (*)    BASIC METABOLIC PANEL - Abnormal    Sodium 144      Potassium 5.0      Chloride 115 (*)     CO2 18 (*)     Glucose 71      BUN 83 (*)     Creatinine 6.2 (*)     Calcium 8.0 (*)     Anion Gap 11      eGFR 9 (*)    PTH, INTACT - Abnormal    PTH Intact 213.4 (*)    ISTAT PROCEDURE - Abnormal    POC Glucose 96      POC BUN 78 (*)     POC Creatinine 7.7 (*)     POC Sodium 142      POC Potassium 6.0 (*)     POC Chloride 112 (*)     POC TCO2 (MEASURED) 17 (*)     POC Ionized Calcium 1.10      POC Hematocrit 23 (*)     Sample SADAF     POCT GLUCOSE - Abnormal    POCT Glucose 222 (*)    UREA NITROGEN, URINE, RANDOM - Normal    Urine Urea Nitrogen 327     OSMOLALITY, URINE RANDOM - Normal    Urine Osmolality 347     CREATININE, URINE, RANDOM - Normal    Urine Creatinine 41.0     SODIUM, URINE, RANDOM - Normal    Urine Sodium 51      Narrative:     The random urine reference ranges provided were established   for 24 hour urine collections.  No reference ranges exist for  random urine specimens.  Correlate clinically.   CBC W/ AUTO DIFFERENTIAL    Narrative:     The following orders were created for panel order CBC auto differential.  Procedure                               Abnormality         Status                     ---------                               -----------         ------                     CBC with Differential[[id number]]       Abnormal            Final result                  Please view results for these tests on the individual orders.   GREY TOP URINE HOLD    Extra Tube Hold for add-ons.     URINALYSIS, REFLEX TO URINE CULTURE   POCT GLUCOSE, HAND-HELD DEVICE   POCT GLUCOSE, HAND-HELD DEVICE   POCT GLUCOSE, HAND-HELD DEVICE   TYPE & SCREEN    Specimen Outdate 05/19/2025 23:59      Group & Rh A POS      Indirect Servando NEG     POCT GLUCOSE    POCT Glucose 79     ISTAT CHEM8   POCT GLUCOSE MONITORING CONTINUOUS   PREPARE RBC SOFT    UNIT NUMBER [id number]      UNIT ABO/RH A POS      DISPENSE STATUS Transfused      Unit Expiration [id number]      Product Code [id number]      Unit Blood Type Code 6200      CROSSMATCH INTERPRETATION Compatible          ECG Results              EKG 12-lead (Final result)        Collection Time Result Time QRS Duration OHS QTC Calculation    05/16/25 08:00:08 05/16/25 10:55:54 124 487                     Final result by Interface, Lab In [facility] (05/16/25 10:55:59)                   Narrative:    Test Reason : R00.1,    Vent. Rate :  45 BPM     Atrial Rate :  45 BPM     P-R Int : 204 ms          QRS Dur : 124 ms      QT Int : 564 ms       P-R-T Axes :  34 -61   2 degrees    QTcB Int : 487 ms    Sinus bradycardia with sinus arrhythmia  Left axis deviation  Right bundle branch block  Inferior infarct ,age undetermined vs due to IVCD  Anteroseptal infarct (cited on or before 12-Jul-2023) vs due to IVCD  Abnormal ECG  When compared with ECG of 01-May-2025 08:46,  Inferior infarct is now Present  Questionable change in initial forces of Septal leads  Confirmed by Celio Manzano ([id number]) on 5/16/2025 10:55:52 AM    Referred By: AAAREFERRAL SELF           Confirmed By: Celio Manzano                                  Imaging Results              X-Ray Chest AP Portable (Final result)  Result time 05/16/25 09:54:11      Final result by Andrew Darby MD (05/16/25 09:54:11)                   Impression:      Please see discussion above.      Electronically  signed by: Andrew Darby  Date:    05/16/2025  Time:    09:54               Narrative:    EXAMINATION:  XR CHEST AP PORTABLE    CLINICAL HISTORY:  hyperkalemia;    TECHNIQUE:  Single frontal view of the chest was performed.    COMPARISON:  Chest radiograph performed 04/24/2025, 08:27 hours.    FINDINGS:  Monitoring leads over the chest.  Loop recorder device.    Grossly unchanged cardiac contours    Chronic interstitial coarsening felt grossly similar when compared to prior examination performed 04/24/2025, 08:27 hours.    Some clearing at right basilar opacity seen on the 04/24/2025 radiograph.  Atelectasis versus resolving infectious or noninfectious inflammatory process considered.  Continued imaging follow-up to resolution with follow-up radiograph in 4-6 weeks would be recommended.    Persistent blunting of the costophrenic angles may relate to small effusions and or scar.    No definite pneumothorax.    No acute findings in the visualized abdomen.  Posttreatment sequela project in the upper abdomen    Osseous and soft tissue structures appear without definite acute change.                                       Medications   calcium gluconate 1 g in NS IVPB (premixed) (0 g Intravenous Stopped 5/16/25 1012)     And   calcium gluconate 1 g in NS IVPB (premixed) (has no administration in time range)   dextrose 50% injection 50 g (50 g Intravenous Given 5/16/25 1047)     And   dextrose 50% injection 25 g (has no administration in time range)     And   insulin regular injection 7.58 Units 0.0758 mL (7.58 Units Intravenous Given 5/16/25 1052)   0.9%  NaCl infusion (for blood administration) (has no administration in time range)   0.9%  NaCl infusion (for blood administration) (has no administration in time range)   sodium chloride 0.9% flush 5 mL (has no administration in time range)   albuterol-ipratropium 2.5 mg-0.5 mg/3 mL nebulizer solution 3 mL (has no administration in time range)   melatonin tablet 6 mg (has  no administration in time range)   ondansetron disintegrating tablet 8 mg (has no administration in time range)   prochlorperazine injection Soln 5 mg (has no administration in time range)   polyethylene glycol packet 17 g (has no administration in time range)   simethicone chewable tablet 80 mg (has no administration in time range)   aluminum-magnesium hydroxide-simethicone 200-200-20 mg/5 mL suspension 30 mL (has no administration in time range)   acetaminophen tablet 650 mg (has no administration in time range)   acetaminophen tablet 1,000 mg (1,000 mg Oral Given 5/17/25 0856)   naloxone 0.4 mg/mL injection 0.02 mg (has no administration in time range)   glucose chewable tablet 16 g (has no administration in time range)   glucose chewable tablet 24 g (has no administration in time range)   dextrose 50% injection 12.5 g (has no administration in time range)   dextrose 50% injection 25 g (has no administration in time range)   glucagon (human recombinant) injection 1 mg (has no administration in time range)   insulin aspart U-100 pen 0-5 Units (has no administration in time range)   allopurinoL tablet 300 mg (300 mg Oral Given 5/17/25 0857)   amiodarone tablet 200 mg (200 mg Oral Given 5/17/25 0857)   apixaban tablet 5 mg (5 mg Oral Given 5/17/25 0857)   aspirin EC tablet 81 mg (81 mg Oral Given 5/17/25 0858)   brimonidine 0.2% ophthalmic solution 1 drop (1 drop Both Eyes Given 5/17/25 0858)   ferrous sulfate tablet 1 each (1 each Oral Given 5/17/25 0857)   gabapentin capsule 200 mg (200 mg Oral Given 5/17/25 0858)   levothyroxine tablet 25 mcg (25 mcg Oral Given 5/17/25 0534)   magnesium oxide tablet 400 mg (400 mg Oral Given 5/17/25 0856)   metoprolol succinate (TOPROL-XL) 24 hr split tablet 12.5 mg (12.5 mg Oral Given 5/17/25 0857)   midodrine tablet 10 mg (10 mg Oral Given 5/17/25 0857)   pravastatin tablet 40 mg (40 mg Oral Given 5/17/25 0857)   tamsulosin 24 hr capsule 0.4 mg (0.4 mg Oral Given 5/17/25 0857)    sodium bicarbonate tablet 650 mg (650 mg Oral Given 5/17/25 0857)   sodium zirconium cyclosilicate packet 10 g (10 g Oral Given 5/17/25 0855)   predniSONE tablet 20 mg (has no administration in time range)     Followed by   predniSONE tablet 10 mg (has no administration in time range)     Followed by   predniSONE tablet 5 mg (has no administration in time range)     Followed by   predniSONE tablet 2.5 mg (has no administration in time range)   LIDOcaine 5 % patch 1 patch (has no administration in time range)   methyl salicylate-menthol 15-10% cream (has no administration in time range)   tramadol split tablet 25 mg (has no administration in time range)   furosemide injection 80 mg (80 mg Intravenous Given 5/16/25 0946)   albuterol sulfate nebulizer solution 10 mg (10 mg Nebulization Given 5/16/25 1011)   magnesium sulfate in dextrose IVPB (premix) 1 g (0 g Intravenous Stopped 5/16/25 1211)   furosemide injection 80 mg (80 mg Intravenous Given 5/16/25 1746)   magnesium sulfate 2g in water 50mL IVPB (premix) (2 g Intravenous New Bag 5/17/25 1046)     Medical Decision Making  Patient is a [age] year old male  with history as stated above.    Differential diagnosis includes, but is not limited to:    -hyperkalemia  -iron deficiency anemia  -hypomagnesemia    Management:     On labs, patient is hyperkalemic and treated with albuterol, insulin/dextrose and calcium gluconate 1 g.  Also found to be anemic; ordered transfusion with for blood cells (written consent obtained).  Case discussed with Nephrology for possible dialysis.  Case discussed with hospital medicine who admitted patient to their service for further management of hyperkalemia, H and H stability and possible dialysis by Nephrology.    Amount and/or Complexity of Data Reviewed  Independent Historian:      Details: Collateral history obtained from daughter.  She states that patient has has been taking p.o. oral potassium repletion.  External Data  Reviewed: radiology.     Details: === Results for orders placed during the hospital encounter of 02/25/25 ===    Echo    - Interpretation Summary -  ·  Left Ventricle: The left ventricle is normal in size. Normal wall thickness. There is concentric hypertrophy. Severe global hypokinesis present. There is moderately reduced systolic function with a visually estimated ejection fraction of 30 - 35%. Quantitated ejection fraction is 30%. There is indeterminate diastolic function. No thrombus.  ·  Right Ventricle: The right ventricle is normal in size. Wall thickness is normal. Systolic function is normal.  ·  Left Atrium: The left atrium is mildly dilated.  ·  Mitral Valve: There is mild to moderate regurgitation with a centrally directed jet.  ·  Tricuspid Valve: There is mild regurgitation with a centrally directed jet.  ·  Pulmonary Artery: The estimated pulmonary artery systolic pressure is 33 mmHg.  ·  IVC/SVC: Intermediate venous pressure at 8 mmHg.     Labs: ordered. Decision-making details documented in ED Course.     Details: CBC CMP magnesium  Radiology: ordered.     Details: Portable chest x-ray  ECG/medicine tests: ordered and independent interpretation performed.     Details: EKG with sinus bradycardia, no STEMI, prolonged QRS compared to prior, prolonged NV segments    Risk  OTC drugs.  Prescription drug management.  Decision regarding hospitalization.              Attending Attestation:             Attending ED Notes:   Attending Attestation:   Physician Attestation Statement for Resident:  I have personally seen and examined this patient and repeated the key portions of the resident's history and physical, reviewed and agree with the resident medical documentation unless stated below, and supervised and managed the medical care of the patient including the treatment, course, plan, and disposition.  Additionally, I was present for the critical portion of any procedure(s) performed.  I have reviewed and  agree with the residents interpretation of the following: lab data, x-rays, CT scans and EKG.  I have reviewed the following: old records at this facility if available.      74 yo M with PMH of CKD who presents with hyperkalemia. K of 6.1 today was 6.8 9 days ago and was 6.3 4 days ago. patient was not told to stop taking his potassium so he has continued to take it including today.  He denies symptoms.  Looks like his potassium issues started after he was prescribed potassium at the end of April 2025. Suspect his potassium will improve once no longer taking the potassium supplement  Also has a hemoglobin of 6.7 today.  He was shifted and given lasix IV.     Critical Care  Date: 05/17/2025  Performed by: Casie Angel MD    Authorized by: Casie Angel MD   Total critical care time (exclusive of procedural time) : 35 minutes  Critical care was necessary to treat or prevent imminent or life-threatening deterioration of the following conditions:  anemia, hyperkalemia        Casie Angel MD  Department of Emergency Medicine              ED Course as of 05/16/25 1606   Fri May 16, 2025   0903    0937       ED Course User Index  [AG] Bree Johnson MD                           Clinical Impression:  Final diagnoses:  [R00.1] Bradycardia  [E87.5] Hyperkalemia (Primary)  [D64.9] Anemia, unspecified type          ED Disposition Condition    Observation                   Bree Johnson MD  Resident  05/16/25 1614         [1]   Social History  Tobacco Use    Smoking status: Never     Passive exposure: Never    Smokeless tobacco: Never   Substance Use Topics    Alcohol use: No    Drug use: No        Casie Angel MD  05/17/25 6666

## 2025-05-16 NOTE — HPI
Jarrett is a pleasant 76 yo man w pmhx significant for T2DM, HTN, [place], HLD, CKD IV (follows w Dr Mukherjee), DVT, pAF (on eliquis), Prostate CA s/p TURP, UC s/p ileostomy, GERD, gout, HFrEF (EF 30%), autonomic dysfunction, BPH, anemia, CAD, and recent campylobacter infection c/b bacteremia, recently at PCP office w K 6.3, Hg 6.9, received transfusion in ED prbc, given iv calcium gluconate and shifted and admitted to the hospital for management of hyperK. Previously seen by Dr Mukherjee, he has CKD4 superimposed GRADY 2/2 age related nephron loss, recurrent GRADY, high ostomy output, HFrEF and diastolic dysfx 35% EF chronic hypotension leading to hypoperfusion, he had discussed dialysis modalities, not a good candidate for PD d/t ileostomy, given his hypotension would also be a challenging candidate for HD. He is on midodrine chronically. Nephrology consulted for management of GRADY and hyperK.

## 2025-05-16 NOTE — SUBJECTIVE & OBJECTIVE
Past Medical History:   Diagnosis Date    Autonomic dysfunction 11/2023 11/2023 neuro note    Basal cell carcinoma     BPH (benign prostatic hyperplasia)     s/p TURP    CAD (coronary artery disease) 2017    status post PCI to mid LAD in 2017 (LHC in 2021 showed patent stent, otherwise nonobstructive CAD)    Cancer of prostate     s/p TURP    Chronic kidney disease     DDD (degenerative disc disease) 10/21/2013    Diabetes mellitus with renal complications     Disc disease, degenerative, cervical     DVT (deep venous thrombosis)     Encounter for blood transfusion     GERD (gastroesophageal reflux disease)     History of ulcerative colitis 1982    s/p colectomy and ileostomy    HLD (hyperlipidemia)     Ileostomy in place 1982    RBBB     Squamous cell carcinoma 03/08/2018    Left superior helix near insertion    Squamous cell carcinoma 04/12/2018    Left forearm x 5    Ventricular tachycardia        Past Surgical History:   Procedure Laterality Date    ABLATION, SVT, ACCESSORY PATHWAY N/A 04/30/2024    Procedure: Ablation, SVT, Accessory Pathway;  Surgeon: Franky Taylor MD;  Location: [facility] EP LAB;  Service: Cardiology;  Laterality: N/A;  VT, RFA, Carto, MAC, GP, 3 Prep *MDT ILR in situ*    cardiac stents      CATARACT EXTRACTION Bilateral     CERVICAL FUSION      CHOLECYSTECTOMY N/A 02/14/2023    Procedure: CHOLECYSTECTOMY;  Surgeon: Mike Joyce MD;  Location: [facility] OR;  Service: General;  Laterality: N/A;  very high probabilty of converison to open    colectomy and ileostomy  1985    EGD, WITH CLOSED BIOPSY  04/04/2025    ENDOSCOPIC ULTRASOUND OF UPPER GASTROINTESTINAL TRACT N/A 02/10/2023    Procedure: ULTRASOUND, UPPER GI TRACT, ENDOSCOPIC;  Surgeon: Poli Fuller MD;  Location: [facility] ENDO;  Service: Endoscopy;  Laterality: N/A;    ERCP N/A 02/10/2023    Procedure: ERCP (ENDOSCOPIC RETROGRADE CHOLANGIOPANCREATOGRAPHY);  Surgeon: Poli Fuller MD;  Location: [facility] ENDO;  Service: Endoscopy;  Laterality:  N/A;    ESOPHAGOGASTRODUODENOSCOPY N/A 4/4/2025    Procedure: EGD (ESOPHAGOGASTRODUODENOSCOPY);  Surgeon: Melania Gibson MD;  Location: [facility] ENDO;  Service: Endoscopy;  Laterality: N/A;    EXCISION OF PAROTID GLAND Left 12/18/2020    Procedure: EXCISION, PAROTID GLAND;  Surgeon: Michael Pinzon MD;  Location: [facility] OR 2ND FLR;  Service: ENT;  Laterality: Left;    INSERTION OF IMPLANTABLE LOOP RECORDER  06/07/2021    INSERTION OF IMPLANTABLE LOOP RECORDER Left 06/07/2021    Procedure: INSERTION, IMPLANTABLE LOOP RECORDER;  Surgeon: Romario Dao MD;  Location: [facility] CATH LAB;  Service: Cardiology;  Laterality: Left;    LEFT HEART CATHETERIZATION Right 04/15/2021    Procedure: CATHETERIZATION, HEART, LEFT;  Surgeon: Marcio Jones MD;  Location: [facility] CATH LAB;  Service: Cardiology;  Laterality: Right;    LYSIS OF ADHESIONS N/A 11/09/2020    Procedure: LYSIS, ADHESIONS,  ERAS low;  Surgeon: CED Haley MD;  Location: [facility] OR 2ND FLR;  Service: Colon and Rectal;  Laterality: N/A;    LYSIS OF ADHESIONS N/A 02/14/2023    Procedure: LYSIS, ADHESIONS;  Surgeon: Mike Joyce MD;  Location: [facility];  Service: General;  Laterality: N/A;    POUCHOSCOPY N/A 04/06/2022    Procedure: ENDOSCOPY, POUCH, SMALL INTESTINE, DIAGNOSTIC;  Surgeon: Dieter Juarez MD;  Location: [facility] (2ND FLR);  Service: Endoscopy;  Laterality: N/A;    REPAIR, HERNIA, PARASTOMAL N/A 11/09/2020    Procedure: REPAIR, HERNIA, PARASTOMAL;  Surgeon: CED Haley MD;  Location: [facility] OR 2ND FLR;  Service: Colon and Rectal;  Laterality: N/A;    TRANSURETHRAL RESECTION OF PROSTATE (TURP) WITHOUT USE OF LASER N/A 01/23/2019    Procedure: TURP, WITHOUT USING LASER BIPOLAR;  Surgeon: Catarino Mota MD;  Location: [facility] OR 1ST FLR;  Service: Urology;  Laterality: N/A;  1.5 HOURS    TREATMENT OF CARDIAC ARRHYTHMIA  04/30/2024    Procedure: Cardioversion or Defibrillation;  Surgeon: Franky Taylor MD;  Location: [facility] EP LAB;   Service: Cardiology;;       Review of patient's allergies indicates:   Allergen Reactions    Atorvastatin     Rosuvastatin      Current Facility-Administered Medications   Medication Frequency    0.9%  NaCl infusion (for blood administration) Q24H PRN    0.9%  NaCl infusion (for blood administration) Q24H PRN    acetaminophen tablet 1,000 mg Q8H PRN    acetaminophen tablet 650 mg Q4H PRN    albuterol-ipratropium 2.5 mg-0.5 mg/3 mL nebulizer solution 3 mL Q4H PRN    allopurinoL tablet 300 mg Daily    aluminum-magnesium hydroxide-simethicone 200-200-20 mg/5 mL suspension 30 mL QID PRN    amiodarone tablet 200 mg Daily    apixaban tablet 5 mg BID    aspirin EC tablet 81 mg Daily    brimonidine 0.2% ophthalmic solution 1 drop BID    calcium gluconate 1 g in NS IVPB (premixed) Q10 Min PRN    dextrose 50% injection 12.5 g PRN    dextrose 50% injection 25 g PRN    dextrose 50% injection 25 g PRN    ferrous sulfate tablet 1 each Daily    furosemide injection 80 mg Once    gabapentin capsule 200 mg BID    glucagon (human recombinant) injection 1 mg PRN    glucose chewable tablet 16 g PRN    glucose chewable tablet 24 g PRN    insulin aspart U-100 pen 0-5 Units QID (AC + HS) PRN    leuprolide acetate (6 month) injection 45 mg Q6 Months    leuprolide acetate (6 month) injection 45 mg Q6 Months    [START ON 5/17/2025] levothyroxine tablet 25 mcg Before breakfast    magnesium oxide tablet 400 mg Daily    melatonin tablet 6 mg Nightly PRN    metoprolol succinate (TOPROL-XL) 24 hr split tablet 12.5 mg Daily    midodrine tablet 10 mg TID WM    naloxone 0.4 mg/mL injection 0.02 mg PRN    ondansetron disintegrating tablet 8 mg Q8H PRN    polyethylene glycol packet 17 g BID PRN    pravastatin tablet 40 mg Daily    prochlorperazine injection Soln 5 mg Q6H PRN    simethicone chewable tablet 80 mg QID PRN    sodium bicarbonate tablet 650 mg BID    sodium chloride 0.9% flush 5 mL PRN    tamsulosin 24 hr capsule 0.4 mg Daily     Current  Outpatient Medications   Medication    ACCU-CHEK SOFTCLIX LANCETS Misc    albuterol (PROVENTIL) 2.5 mg /3 mL (0.083 %) nebulizer solution    alcohol swabs (DROPSAFE ALCOHOL PREP PADS) PadM    allopurinoL (ZYLOPRIM) 100 MG tablet    amiodarone (PACERONE) 200 MG Tab    apixaban (ELIQUIS) 5 mg Tab    aspirin (ECOTRIN) 81 MG EC tablet    azelastine (ASTELIN) 137 mcg (0.1 %) nasal spray    blood sugar diagnostic (ACCU-CHEK GUIDE TEST STRIPS) Strp    blood-glucose meter (ACCU-CHEK GUIDE GLUCOSE METER) Misc    brimonidine 0.2% (ALPHAGAN) 0.2 % Drop    colchicine (COLCRYS) 0.6 mg tablet    ferrous sulfate (IRON) 325 mg (65 mg iron) Tab tablet    [Paused] furosemide (LASIX) 20 MG tablet    gabapentin (NEURONTIN) 100 MG capsule    glimepiride (AMARYL) 4 MG tablet    leuprolide acetate, 6 month, (LUPRON) 45 mg SyKt injection    levothyroxine (SYNTHROID) 25 MCG tablet    levothyroxine (SYNTHROID) 25 MCG tablet    magnesium oxide (MAGOX) 400 mg (241.3 mg magnesium) tablet    meclizine (ANTIVERT) 25 mg tablet    metoprolol succinate (TOPROL-XL) 25 MG 24 hr tablet    midodrine (PROAMATINE) 10 MG tablet    pantoprazole (PROTONIX) 40 MG tablet    potassium chloride SA (K-DUR,KLOR-CON) 20 MEQ tablet    pravastatin (PRAVACHOL) 40 MG tablet    predniSONE (DELTASONE) 2.5 MG tablet    tamsulosin (FLOMAX) 0.4 mg Cap    vit A/C/E ac/ZnOx/cupric oxide (EYE VITAMIN AND MINERALS ORAL)     Facility-Administered Medications Ordered in Other Encounters   Medication Frequency    sodium chloride 0.9% in 1,000 mL infusion Continuous     Family History       Problem Relation (Age of Onset)    Cancer Father    Dementia Mother    Diabetes Father    Heart disease Father          Tobacco Use    Smoking status: Never     Passive exposure: Never    Smokeless tobacco: Never   Substance and Sexual Activity    Alcohol use: No    Drug use: No    Sexual activity: Not Currently     Partners: Female     Review of Systems   Constitutional:  Negative for chills  and fever.   Respiratory:  Negative for shortness of breath.    Cardiovascular:  Positive for leg swelling. Negative for chest pain.   Genitourinary:  Negative for difficulty urinating.   Neurological:  Negative for dizziness and weakness.     Objective:     Vital Signs (Most Recent):  Temp: 97.3 °F (36.3 °C) (05/16/25 1200)  Pulse: (!) 54 (05/16/25 1400)  Resp: 16 (05/16/25 1300)  BP: (!) 114/55 (05/16/25 1300)  SpO2: 100 % (05/16/25 1400) Vital Signs (24h Range):  Temp:  [97.3 °F (36.3 °C)-98.2 °F (36.8 °C)] 97.3 °F (36.3 °C)  Pulse:  [44-73] 54  Resp:  [14-18] 16  SpO2:  [99 %-100 %] 100 %  BP: (109-146)/(55-93) 114/55     Weight: 75.8 kg (167 lb) (05/16/25 0756)  Body mass index is 21.44 kg/m².  Body surface area is 1.99 meters squared.    No intake/output data recorded.     Physical Exam  HENT:      Head: Normocephalic.   Cardiovascular:      Rate and Rhythm: Regular rhythm.      Heart sounds: Normal heart sounds.   Pulmonary:      Breath sounds: Normal breath sounds.   Abdominal:      Palpations: Abdomen is soft.   Musculoskeletal:         General: Swelling present.   Skin:     General: Skin is warm.   Neurological:      General: No focal deficit present.      Mental Status: He is alert.   Psychiatric:         Mood and Affect: Mood normal.          Significant Labs:  CBC:   Recent Labs   Lab 05/16/25  0904 05/16/25  0912   WBC 5.66  --    RBC 2.26*  --    HGB 6.7*  --    HCT 22.5* 23*   *  --    *  --    MCH 29.6  --    MCHC 29.8*  --      CMP:   Recent Labs   Lab 05/16/25  0904   GLU 97   CALCIUM 7.8*   ALBUMIN 2.5*   PROT 5.4*      K 6.1*   CO2 17*   *   BUN 82*   CREATININE 6.8*   ALKPHOS 102   ALT 19   AST 21   BILITOT 0.4     All labs within the past 24 hours have been reviewed.

## 2025-05-16 NOTE — ASSESSMENT & PLAN NOTE
Patient's blood pressure range in the last 24 hours was: BP  Min: 109/57  Max: 146/67.The patient's inpatient anti-hypertensive regimen is listed below:  Current Antihypertensives  metoprolol succinate (TOPROL-XL) 24 hr split tablet 12.5 mg, Daily, Oral  furosemide injection 80 mg, Once, Intravenous    Plan  - BP is controlled, no changes needed to their regimen  - hx of autonomic dysfunction, continue midodrine

## 2025-05-16 NOTE — ASSESSMENT & PLAN NOTE
Patient's FSGs are controlled on current medication regimen.  Last A1c reviewed-   Lab Results   Component Value Date    HGBA1C 5.7 (H) 05/07/2025     Most recent fingerstick glucose reviewed-   Recent Labs   Lab 05/16/25  1157   POCTGLUCOSE 222*     Current correctional scale  Low  Maintain anti-hyperglycemic dose as follows-   Antihyperglycemics (From admission, onward)      Start     Stop Route Frequency Ordered    05/16/25 1155  insulin aspart U-100 pen 0-5 Units         -- SubQ Before meals & nightly PRN 05/16/25 1056          Hold Oral hypoglycemics (glimepiride) while patient is in the hospital.  - diabetic diet

## 2025-05-16 NOTE — ED NOTES
I-STAT Chem-8+ Results:   Value Reference Range   Sodium 142 136-145 mmol/L   Potassium  6.0 3.5-5.1 mmol/L   Chloride 112  mmol/L   Ionized Calcium 1.10 1.06-1.42 mmol/L   CO2 (measured) 17 23-29 mmol/L   Glucose 96  mg/dL   BUN 78 6-30 mg/dL   Creatinine 7.7 0.5-1.4 mg/dL   Hematocrit 23 36-54%

## 2025-05-16 NOTE — ASSESSMENT & PLAN NOTE
- last echo 02/2025 with EF 30-35%, indeterminate LV diastolic function   - he appears hypervolemic on exam with BLE edema  - CXR concerning for small pleural effusion, but no rales on exam  - will obtain BNP  - continue BB  - Monitor strict Is&Os and daily weights.    - Place on fluid restriction of 1.5 L.   - monitor tele

## 2025-05-16 NOTE — PHARMACY MED REC
"          Admission Medication History     The home medication history was taken by Tianna Mishra.    You may go to "Admission" then "Reconcile Home Medications" tabs to review and/or act upon these items.     The home medication list has been updated by the Pharmacy department.   Please read ALL comments highlighted in yellow.   Please address this information as you see fit.    Feel free to contact us if you have any questions or require assistance.      The medications listed below were removed from the home medication list. Please reorder if appropriate:  Patient reports no longer taking the following medication(s):  Proventil   Astelin   Synthroid   Antivert     Medications listed below were obtained from: Patient/family and Analytic software- [employer]  Current Outpatient Medications on File Prior to Encounter   Medication Sig    allopurinoL (ZYLOPRIM) 100 MG tablet Take 4 Tablets (400 Mg Total) By Mouth Once Daily.      amiodarone (PACERONE) 200 MG Tab Take 1 Tablet (200 Mg Total) By Mouth Once Daily.      apixaban (ELIQUIS) 5 mg Tab Take 1 Tablet (5 Mg Total) By Mouth 2 (Two) Times Daily.      aspirin (ECOTRIN) 81 MG EC tablet Take 1 Tablet (81 Mg Total) By Mouth Once Daily.      brimonidine 0.2% (ALPHAGAN) 0.2 % Drop Instill 1 Drop Into Both Eyes 2 (Two) Times A Day.      colchicine (COLCRYS) 0.6 mg tablet Take 1 Tablet (0.6 Mg Total) By Mouth Every Other Day.      ferrous sulfate (IRON) 325 mg (65 mg iron) Tab tablet Take 1 Tablet (325 Mg Total) By Mouth Every Other Day.       [Paused] furosemide (LASIX) 20 MG tablet Take 1 tablet (20 Mg Total) By Mouth Once Daily.      gabapentin (NEURONTIN) 100 MG capsule Take 2 Capsules (200 Mg Total) By Mouth Every Evening.      glimepiride (AMARYL) 4 MG tablet Take 1 Tablet (4 Mg Total) By Mouth Daily With Breakfast.      magnesium oxide (MAGOX) 400 mg (241.3 mg magnesium) tablet Take 1 Tablet (400 Mg Total) By Mouth Once Daily.      metoprolol succinate (TOPROL-XL) 25 " MG 24 hr tablet Take 0.5 Tablets (12.5 Mg Total) By Mouth Once Daily.      midodrine (PROAMATINE) 10 MG tablet Take 1 Tablet (10 Mg Total) By Mouth 3 (Three) Times Daily With Meals.      pantoprazole (PROTONIX) 40 MG tablet Take 1 Tablet (40 Mg Total) By Mouth Once Daily.      potassium chloride SA (K-DUR,KLOR-CON) 20 MEQ tablet Take 1 Tablet (20 Meq Total) By Mouth Once Daily.      pravastatin (PRAVACHOL) 40 MG tablet Take 1 Tablet (40 Mg Total) By Mouth Once Daily.      predniSONE (DELTASONE) 2.5 MG tablet Take 1 Tablet (2.5 Mg Total) By Mouth Once Daily.      tamsulosin (FLOMAX) 0.4 mg Cap Take 1 Capsule (0.4 Mg Total) By Mouth Once Daily.      vit A/C/E ac/ZnOx/cupric oxide (EYE VITAMIN AND MINERALS ORAL) Take 1 Tablet By Mouth Every Morning.       Tianna Mishra  [id number]        .

## 2025-05-16 NOTE — CONSULTS
[facility] - Emergency Dept  Nephrology  Consult Note    Patient Name: Jarrett Charlton Jr.  MRN: [id number]  Admission Date: 5/16/2025  Hospital Length of Stay: 0 days  Attending Provider: Diane Mcclelland MD   Primary Care Physician: Olga Hampton MD  Principal Problem:Hyperkalemia    Inpatient consult to Nephrology  Consult performed by: Jaswinder Winston MD  Consult ordered by: Bree Johnson MD  Reason for consult: GRADY, hyperK        Subjective:     HPI: Jarrett is a pleasant 76 yo man w pmhx significant for T2DM, HTN, MADISON, HLD, CKD IV (follows w Dr Mukherjee), DVT, pAF (on eliquis), Prostate CA s/p TURP, UC s/p ileostomy, GERD, gout, HFrEF (EF 30%), autonomic dysfunction, BPH, anemia, CAD, and recent campylobacter infection c/b bacteremia, recently at PCP office w K 6.3, Hg 6.9, received transfusion in ED prbc, given iv calcium gluconate and shifted and admitted to the hospital for management of hyperK. Previously seen by Dr Mukherjee, he has CKD4 superimposed GRADY 2/2 age related nephron loss, recurrent GRADY, high ostomy output, HFrEF and diastolic dysfx 35% EF chronic hypotension leading to hypoperfusion, he had discussed dialysis modalities, not a good candidate for PD d/t ileostomy, given his hypotension would also be a challenging candidate for HD. He is on midodrine chronically. Nephrology consulted for management of GRADY and hyperK.    Past Medical History:   Diagnosis Date    Autonomic dysfunction 11/2023 11/2023 neuro note    Basal cell carcinoma     BPH (benign prostatic hyperplasia)     s/p TURP    CAD (coronary artery disease) 2017    status post PCI to mid LAD in 2017 ([facility] in 2021 showed patent stent, otherwise nonobstructive CAD)    Cancer of prostate     s/p TURP    Chronic kidney disease     DDD (degenerative disc disease) 10/21/2013    Diabetes mellitus with renal complications     Disc disease, degenerative, cervical     DVT (deep venous thrombosis)     Encounter for blood transfusion     GERD  (gastroesophageal reflux disease)     History of ulcerative colitis 1982    s/p colectomy and ileostomy    HLD (hyperlipidemia)     Ileostomy in place 1982    RBBB     Squamous cell carcinoma 03/08/2018    Left superior helix near insertion    Squamous cell carcinoma 04/12/2018    Left forearm x 5    Ventricular tachycardia        Past Surgical History:   Procedure Laterality Date    ABLATION, SVT, ACCESSORY PATHWAY N/A 04/30/2024    Procedure: Ablation, SVT, Accessory Pathway;  Surgeon: Franky Taylor MD;  Location: [facility] EP LAB;  Service: Cardiology;  Laterality: N/A;  VT, RFA, Carto, MAC, GP, 3 Prep *MDT ILR in situ*    cardiac stents      CATARACT EXTRACTION Bilateral     CERVICAL FUSION      CHOLECYSTECTOMY N/A 02/14/2023    Procedure: CHOLECYSTECTOMY;  Surgeon: Mike Joyce MD;  Location: [facility];  Service: General;  Laterality: N/A;  very high probabilty of converison to open    colectomy and ileostomy  1985    EGD, WITH CLOSED BIOPSY  04/04/2025    ENDOSCOPIC ULTRASOUND OF UPPER GASTROINTESTINAL TRACT N/A 02/10/2023    Procedure: ULTRASOUND, UPPER GI TRACT, ENDOSCOPIC;  Surgeon: oPli Fuller MD;  Location: [facility];  Service: Endoscopy;  Laterality: N/A;    ERCP N/A 02/10/2023    Procedure: ERCP (ENDOSCOPIC RETROGRADE CHOLANGIOPANCREATOGRAPHY);  Surgeon: Poli Fuller MD;  Location: [facility];  Service: Endoscopy;  Laterality: N/A;    ESOPHAGOGASTRODUODENOSCOPY N/A 4/4/2025    Procedure: EGD (ESOPHAGOGASTRODUODENOSCOPY);  Surgeon: Melania Gibson MD;  Location: [facility];  Service: Endoscopy;  Laterality: N/A;    EXCISION OF PAROTID GLAND Left 12/18/2020    Procedure: EXCISION, PAROTID GLAND;  Surgeon: Michael Pinzon MD;  Location: [address] FLR;  Service: ENT;  Laterality: Left;    INSERTION OF IMPLANTABLE LOOP RECORDER  06/07/2021    INSERTION OF IMPLANTABLE LOOP RECORDER Left 06/07/2021    Procedure: INSERTION, IMPLANTABLE LOOP RECORDER;  Surgeon: Romario Dao MD;  Location: [facility]  CATH LAB;  Service: Cardiology;  Laterality: Left;    LEFT HEART CATHETERIZATION Right 04/15/2021    Procedure: CATHETERIZATION, HEART, LEFT;  Surgeon: Marcio Jones MD;  Location: [facility] CATH LAB;  Service: Cardiology;  Laterality: Right;    LYSIS OF ADHESIONS N/A 11/09/2020    Procedure: LYSIS, ADHESIONS,  ERAS low;  Surgeon: CED Haley MD;  Location: [facility] OR 2ND FLR;  Service: Colon and Rectal;  Laterality: N/A;    LYSIS OF ADHESIONS N/A 02/14/2023    Procedure: LYSIS, ADHESIONS;  Surgeon: Mike Joyce MD;  Location: [facility] OR;  Service: General;  Laterality: N/A;    POUCHOSCOPY N/A 04/06/2022    Procedure: ENDOSCOPY, POUCH, SMALL INTESTINE, DIAGNOSTIC;  Surgeon: Dieter Juarez MD;  Location: [facility] ENDO (2ND FLR);  Service: Endoscopy;  Laterality: N/A;    REPAIR, HERNIA, PARASTOMAL N/A 11/09/2020    Procedure: REPAIR, HERNIA, PARASTOMAL;  Surgeon: CED Haley MD;  Location: [facility] OR 2ND FLR;  Service: Colon and Rectal;  Laterality: N/A;    TRANSURETHRAL RESECTION OF PROSTATE (TURP) WITHOUT USE OF LASER N/A 01/23/2019    Procedure: TURP, WITHOUT USING LASER BIPOLAR;  Surgeon: Catarino Mota MD;  Location: [facility] 1ST FLR;  Service: Urology;  Laterality: N/A;  1.5 HOURS    TREATMENT OF CARDIAC ARRHYTHMIA  04/30/2024    Procedure: Cardioversion or Defibrillation;  Surgeon: Franky Taylor MD;  Location: [facility] EP LAB;  Service: Cardiology;;       Review of patient's allergies indicates:   Allergen Reactions    Atorvastatin     Rosuvastatin      Current Facility-Administered Medications   Medication Frequency    0.9%  NaCl infusion (for blood administration) Q24H PRN    0.9%  NaCl infusion (for blood administration) Q24H PRN    acetaminophen tablet 1,000 mg Q8H PRN    acetaminophen tablet 650 mg Q4H PRN    albuterol-ipratropium 2.5 mg-0.5 mg/3 mL nebulizer solution 3 mL Q4H PRN    allopurinoL tablet 300 mg Daily    aluminum-magnesium hydroxide-simethicone 200-200-20 mg/5 mL suspension 30 mL QID  PRN    amiodarone tablet 200 mg Daily    apixaban tablet 5 mg BID    aspirin EC tablet 81 mg Daily    brimonidine 0.2% ophthalmic solution 1 drop BID    calcium gluconate 1 g in NS IVPB (premixed) Q10 Min PRN    dextrose 50% injection 12.5 g PRN    dextrose 50% injection 25 g PRN    dextrose 50% injection 25 g PRN    ferrous sulfate tablet 1 each Daily    furosemide injection 80 mg Once    gabapentin capsule 200 mg BID    glucagon (human recombinant) injection 1 mg PRN    glucose chewable tablet 16 g PRN    glucose chewable tablet 24 g PRN    insulin aspart U-100 pen 0-5 Units QID (AC + HS) PRN    leuprolide acetate (6 month) injection 45 mg Q6 Months    leuprolide acetate (6 month) injection 45 mg Q6 Months    [START ON 5/17/2025] levothyroxine tablet 25 mcg Before breakfast    magnesium oxide tablet 400 mg Daily    melatonin tablet 6 mg Nightly PRN    metoprolol succinate (TOPROL-XL) 24 hr split tablet 12.5 mg Daily    midodrine tablet 10 mg TID WM    naloxone 0.4 mg/mL injection 0.02 mg PRN    ondansetron disintegrating tablet 8 mg Q8H PRN    polyethylene glycol packet 17 g BID PRN    pravastatin tablet 40 mg Daily    prochlorperazine injection Soln 5 mg Q6H PRN    simethicone chewable tablet 80 mg QID PRN    sodium bicarbonate tablet 650 mg BID    sodium chloride 0.9% flush 5 mL PRN    tamsulosin 24 hr capsule 0.4 mg Daily     Current Outpatient Medications   Medication    ACCU-CHEK SOFTCLIX LANCETS Misc    albuterol (PROVENTIL) 2.5 mg /3 mL (0.083 %) nebulizer solution    alcohol swabs (DROPSAFE ALCOHOL PREP PADS) PadM    allopurinoL (ZYLOPRIM) 100 MG tablet    amiodarone (PACERONE) 200 MG Tab    apixaban (ELIQUIS) 5 mg Tab    aspirin (ECOTRIN) 81 MG EC tablet    azelastine (ASTELIN) 137 mcg (0.1 %) nasal spray    blood sugar diagnostic (ACCU-CHEK GUIDE TEST STRIPS) Strp    blood-glucose meter (ACCU-CHEK GUIDE GLUCOSE METER) Misc    brimonidine 0.2% (ALPHAGAN) 0.2 % Drop    colchicine (COLCRYS) 0.6 mg tablet     ferrous sulfate (IRON) 325 mg (65 mg iron) Tab tablet    [Paused] furosemide (LASIX) 20 MG tablet    gabapentin (NEURONTIN) 100 MG capsule    glimepiride (AMARYL) 4 MG tablet    leuprolide acetate, 6 month, (LUPRON) 45 mg SyKt injection    levothyroxine (SYNTHROID) 25 MCG tablet    levothyroxine (SYNTHROID) 25 MCG tablet    magnesium oxide (MAGOX) 400 mg (241.3 mg magnesium) tablet    meclizine (ANTIVERT) 25 mg tablet    metoprolol succinate (TOPROL-XL) 25 MG 24 hr tablet    midodrine (PROAMATINE) 10 MG tablet    pantoprazole (PROTONIX) 40 MG tablet    potassium chloride SA (K-DUR,KLOR-CON) 20 MEQ tablet    pravastatin (PRAVACHOL) 40 MG tablet    predniSONE (DELTASONE) 2.5 MG tablet    tamsulosin (FLOMAX) 0.4 mg Cap    vit A/C/E ac/ZnOx/cupric oxide (EYE VITAMIN AND MINERALS ORAL)     Facility-Administered Medications Ordered in Other Encounters   Medication Frequency    sodium chloride 0.9% in 1,000 mL infusion Continuous     Family History       Problem Relation (Age of Onset)    Cancer Father    Dementia Mother    Diabetes Father    Heart disease Father          Tobacco Use    Smoking status: Never     Passive exposure: Never    Smokeless tobacco: Never   Substance and Sexual Activity    Alcohol use: No    Drug use: No    Sexual activity: Not Currently     Partners: Female     Review of Systems   Constitutional:  Negative for chills and fever.   Respiratory:  Negative for shortness of breath.    Cardiovascular:  Positive for leg swelling. Negative for chest pain.   Genitourinary:  Negative for difficulty urinating.   Neurological:  Negative for dizziness and weakness.     Objective:     Vital Signs (Most Recent):  Temp: 97.3 °F (36.3 °C) (05/16/25 1200)  Pulse: (!) 54 (05/16/25 1400)  Resp: 16 (05/16/25 1300)  BP: (!) 114/55 (05/16/25 1300)  SpO2: 100 % (05/16/25 1400) Vital Signs (24h Range):  Temp:  [97.3 °F (36.3 °C)-98.2 °F (36.8 °C)] 97.3 °F (36.3 °C)  Pulse:  [44-73] 54  Resp:  [14-18] 16  SpO2:  [99 %-100  %] 100 %  BP: (109-146)/(55-93) 114/55     Weight: 75.8 kg (167 lb) (05/16/25 0756)  Body mass index is 21.44 kg/m².  Body surface area is 1.99 meters squared.    No intake/output data recorded.     Physical Exam  HENT:      Head: Normocephalic.   Cardiovascular:      Rate and Rhythm: Regular rhythm.      Heart sounds: Normal heart sounds.   Pulmonary:      Breath sounds: Normal breath sounds.   Abdominal:      Palpations: Abdomen is soft.   Musculoskeletal:         General: Swelling present.   Skin:     General: Skin is warm.   Neurological:      General: No focal deficit present.      Mental Status: He is alert.   Psychiatric:         Mood and Affect: Mood normal.          Significant Labs:  CBC:   Recent Labs   Lab 05/16/25  0904 05/16/25  0912   WBC 5.66  --    RBC 2.26*  --    HGB 6.7*  --    HCT 22.5* 23*   *  --    *  --    MCH 29.6  --    MCHC 29.8*  --      CMP:   Recent Labs   Lab 05/16/25  0904   GLU 97   CALCIUM 7.8*   ALBUMIN 2.5*   PROT 5.4*      K 6.1*   CO2 17*   *   BUN 82*   CREATININE 6.8*   ALKPHOS 102   ALT 19   AST 21   BILITOT 0.4     All labs within the past 24 hours have been reviewed.  Assessment/Plan:   HyperK  Acute on chronic kidney disease  Anemia in CKD4  CAD  HTN  DM2  High output ileostomy  MADISON  HFrEF 30% and grade 2 diastolic dysfx  pAF  Prostate ca s/p TURP  Recent campylobacter infection w bacteremia    -admit to hospital for hyperK  -prbc transfused for anemia. Iron levels stable.   -K 6.8 in the ED, repeat 6.3. No EKG changes. Asymptomatic. S/p lasix 80 mg iv x1, magnesium, calcium gluconate, insulin 7.58, D50. Approx 1 mo ago started drinking 3-4 cups orange juice per day and also stopped taking torsemide d/t low BP. Has not taken lisinopril in many years. He says at home his BP has been stable on midodrine. Suspect his hyperK may be d/t large intake of orange juice w minimal kidney function GRADY and also stopping diuretics. Not sure exact reason for  GRADY, possibly d/t hemodynamic instability as he has known chronic hypotension.   -Please give another dose of IV lasix 80 iv x1 this evening and start 80 iv qd. Also repeat shift w insulin. Recheck BMP in am. Ideally would keep him off dialysis as long as possible to maintain as much quality of life as possible. If K refractory to medications, then he may need to be initiated on dialysis. Consent obtained and left w HD nurse.   -lokelma 10 gm tid  -check pth  -will need follow up visit w vascular for establishment of access if we do not initiate dialysis here    Thank you for your consult. I will follow-up with patient. Please contact us if you have any additional questions.    Jaswinder Winston MD  Nephrology  [facility] - Emergency Dept

## 2025-05-16 NOTE — ASSESSMENT & PLAN NOTE
Hyperkalemia is likely due to ESRD and use of K supplements.The patients most recent potassium results are listed below.  Recent Labs     05/16/25  0904   K 6.1*     Plan  - Monitor for arrhythmias with EKG and/or continuous telemetry.   - Treat the hyperkalemia with Potassium Binders, Calcium gluconate, IV insulin and dextrose, Nebulized albuterol sulfate, Furosemide, and Sodium Bicarbonate.   - Monitor potassium: Every 12 hours  - The patient's hyperkalemia is improving

## 2025-05-16 NOTE — ASSESSMENT & PLAN NOTE
Patient has paroxysmal (<7 days) atrial fibrillation. Patient is currently in sinus rhythm. [id number] Score: 4. The patients heart rate in the last 24 hours is as follows:  Pulse  Min: 44  Max: 73     Antiarrhythmics  amiodarone tablet 200 mg, Daily, Oral  metoprolol succinate (TOPROL-XL) 24 hr split tablet 12.5 mg, Daily, Oral    Anticoagulants  apixaban tablet 5 mg, 2 times daily, Oral    Plan  - Replete lytes with a goal of K>4, Mg >2  - Patient is anticoagulated, see medications listed above.  - Patient's afib is currently controlled

## 2025-05-16 NOTE — SUBJECTIVE & OBJECTIVE
Past Medical History:   Diagnosis Date    Autonomic dysfunction 11/2023 11/2023 neuro note    Basal cell carcinoma     BPH (benign prostatic hyperplasia)     s/p TURP    CAD (coronary artery disease) 2017    status post PCI to mid LAD in 2017 (LHC in 2021 showed patent stent, otherwise nonobstructive CAD)    Cancer of prostate     s/p TURP    Chronic kidney disease     DDD (degenerative disc disease) 10/21/2013    Diabetes mellitus with renal complications     Disc disease, degenerative, cervical     DVT (deep venous thrombosis)     Encounter for blood transfusion     GERD (gastroesophageal reflux disease)     History of ulcerative colitis 1982    s/p colectomy and ileostomy    HLD (hyperlipidemia)     Ileostomy in place 1982    RBBB     Squamous cell carcinoma 03/08/2018    Left superior helix near insertion    Squamous cell carcinoma 04/12/2018    Left forearm x 5    Ventricular tachycardia        Past Surgical History:   Procedure Laterality Date    ABLATION, SVT, ACCESSORY PATHWAY N/A 04/30/2024    Procedure: Ablation, SVT, Accessory Pathway;  Surgeon: Franky Taylor MD;  Location: [facility] EP LAB;  Service: Cardiology;  Laterality: N/A;  VT, RFA, Carto, MAC, GP, 3 Prep *MDT ILR in situ*    cardiac stents      CATARACT EXTRACTION Bilateral     CERVICAL FUSION      CHOLECYSTECTOMY N/A 02/14/2023    Procedure: CHOLECYSTECTOMY;  Surgeon: Mike Joyce MD;  Location: [facility] OR;  Service: General;  Laterality: N/A;  very high probabilty of converison to open    colectomy and ileostomy  1985    EGD, WITH CLOSED BIOPSY  04/04/2025    ENDOSCOPIC ULTRASOUND OF UPPER GASTROINTESTINAL TRACT N/A 02/10/2023    Procedure: ULTRASOUND, UPPER GI TRACT, ENDOSCOPIC;  Surgeon: Poli Fuller MD;  Location: [facility] ENDO;  Service: Endoscopy;  Laterality: N/A;    ERCP N/A 02/10/2023    Procedure: ERCP (ENDOSCOPIC RETROGRADE CHOLANGIOPANCREATOGRAPHY);  Surgeon: Poli Fuller MD;  Location: [facility] ENDO;  Service: Endoscopy;  Laterality:  N/A;    ESOPHAGOGASTRODUODENOSCOPY N/A 4/4/2025    Procedure: EGD (ESOPHAGOGASTRODUODENOSCOPY);  Surgeon: Melania Gibson MD;  Location: [facility] ENDO;  Service: Endoscopy;  Laterality: N/A;    EXCISION OF PAROTID GLAND Left 12/18/2020    Procedure: EXCISION, PAROTID GLAND;  Surgeon: Michael Pinzon MD;  Location: [facility] OR 2ND FLR;  Service: ENT;  Laterality: Left;    INSERTION OF IMPLANTABLE LOOP RECORDER  06/07/2021    INSERTION OF IMPLANTABLE LOOP RECORDER Left 06/07/2021    Procedure: INSERTION, IMPLANTABLE LOOP RECORDER;  Surgeon: Romario Dao MD;  Location: [facility] CATH LAB;  Service: Cardiology;  Laterality: Left;    LEFT HEART CATHETERIZATION Right 04/15/2021    Procedure: CATHETERIZATION, HEART, LEFT;  Surgeon: Marcio Jones MD;  Location: [facility] CATH LAB;  Service: Cardiology;  Laterality: Right;    LYSIS OF ADHESIONS N/A 11/09/2020    Procedure: LYSIS, ADHESIONS,  ERAS low;  Surgeon: CED Haley MD;  Location: [facility] OR 2ND FLR;  Service: Colon and Rectal;  Laterality: N/A;    LYSIS OF ADHESIONS N/A 02/14/2023    Procedure: LYSIS, ADHESIONS;  Surgeon: Mike Joyce MD;  Location: [facility];  Service: General;  Laterality: N/A;    POUCHOSCOPY N/A 04/06/2022    Procedure: ENDOSCOPY, POUCH, SMALL INTESTINE, DIAGNOSTIC;  Surgeon: Dieter Juarez MD;  Location: [facility] (2ND FLR);  Service: Endoscopy;  Laterality: N/A;    REPAIR, HERNIA, PARASTOMAL N/A 11/09/2020    Procedure: REPAIR, HERNIA, PARASTOMAL;  Surgeon: CED Haley MD;  Location: [facility] OR 2ND FLR;  Service: Colon and Rectal;  Laterality: N/A;    TRANSURETHRAL RESECTION OF PROSTATE (TURP) WITHOUT USE OF LASER N/A 01/23/2019    Procedure: TURP, WITHOUT USING LASER BIPOLAR;  Surgeon: Catarino Mota MD;  Location: [facility] OR 1ST FLR;  Service: Urology;  Laterality: N/A;  1.5 HOURS    TREATMENT OF CARDIAC ARRHYTHMIA  04/30/2024    Procedure: Cardioversion or Defibrillation;  Surgeon: Franky Taylor MD;  Location: [facility] EP LAB;   Service: Cardiology;;       Review of patient's allergies indicates:   Allergen Reactions    Atorvastatin     Rosuvastatin        Current Facility-Administered Medications on File Prior to Encounter   Medication    leuprolide acetate (6 month) injection 45 mg    leuprolide acetate (6 month) injection 45 mg    sodium chloride 0.9% in 1,000 mL infusion     Current Outpatient Medications on File Prior to Encounter   Medication Sig    ACCU-CHEK SOFTCLIX LANCETS [facility] TEST BLOOD SUGAR THREE TIMES DAILY    albuterol (PROVENTIL) 2.5 mg /3 mL (0.083 %) nebulizer solution Take 3 mLs (2.5 mg total) by nebulization every 6 (six) hours as needed for Wheezing or Shortness of Breath. Rescue (Patient not taking: Reported on 5/13/2025)    alcohol swabs (DROPSAFE ALCOHOL PREP PADS) PadM Apply 1 each topically once daily.    allopurinoL (ZYLOPRIM) 100 MG tablet Take 4 tablets (400 mg total) by mouth once daily.    amiodarone (PACERONE) 200 MG Tab Take 1 tablet (200 mg total) by mouth once daily.    apixaban (ELIQUIS) 5 mg Tab Take 1 tablet (5 mg total) by mouth 2 (two) times daily.    aspirin (ECOTRIN) 81 MG EC tablet Take 81 mg by mouth once daily.    azelastine (ASTELIN) 137 mcg (0.1 %) nasal spray 1 spray (137 mcg total) by Nasal route 2 (two) times daily.    blood sugar diagnostic (ACCU-CHEK GUIDE TEST STRIPS) Strp 1 each by Other route 3 (three) times daily. Test Blood Sugar    blood-glucose meter (ACCU-CHEK GUIDE GLUCOSE METER) [facility] Accucheck BID    brimonidine 0.2% (ALPHAGAN) 0.2 % Drop Place 1 drop into both eyes 2 (two) times a day.    colchicine (COLCRYS) 0.6 mg tablet Take 1 tablet (0.6 mg total) by mouth every other day.    ferrous sulfate (IRON) 325 mg (65 mg iron) Tab tablet Take 1 tablet (325 mg total) by mouth every other day.    [Paused] furosemide (LASIX) 20 MG tablet Take 20 mg by mouth once daily.    gabapentin (NEURONTIN) 100 MG capsule Take 2 capsules (200 mg total) by mouth every evening.    glimepiride (AMARYL)  4 MG tablet Take 1 tablet (4 mg total) by mouth daily with breakfast.    leuprolide acetate, 6 month, (LUPRON) 45 mg SyKt injection Inject 45 mg into the muscle every 6 (six) months. for 4 doses    levothyroxine (SYNTHROID) 25 MCG tablet Take 1 tablet (25 mcg total) by mouth before breakfast. (Patient not taking: Reported on 5/13/2025)    levothyroxine (SYNTHROID) 25 MCG tablet Take 1 tablet (25 mcg total) by mouth before breakfast. (Patient not taking: Reported on 5/13/2025)    magnesium oxide (MAGOX) 400 mg (241.3 mg magnesium) tablet Take 1 tablet (400 mg total) by mouth once daily.    meclizine (ANTIVERT) 25 mg tablet Take 1 tablet (25 mg total) by mouth 3 (three) times daily as needed for Dizziness.    metoprolol succinate (TOPROL-XL) 25 MG 24 hr tablet Take 0.5 tablets (12.5 mg total) by mouth once daily.    midodrine (PROAMATINE) 10 MG tablet Take 1 tablet (10 mg total) by mouth 3 (three) times daily with meals.    pantoprazole (PROTONIX) 40 MG tablet Take 40 mg by mouth once daily.    potassium chloride SA (K-DUR,KLOR-CON) 20 MEQ tablet Take 20 mEq by mouth once daily.    pravastatin (PRAVACHOL) 40 MG tablet Take 1 tablet (40 mg total) by mouth once daily.    predniSONE (DELTASONE) 2.5 MG tablet Take 2.5 mg by mouth once daily.    tamsulosin (FLOMAX) 0.4 mg Cap Take 1 capsule (0.4 mg total) by mouth once daily.    vit A/C/E ac/ZnOx/cupric oxide (EYE VITAMIN AND MINERALS ORAL) Take 1 tablet by mouth every morning.     Family History       Problem Relation (Age of Onset)    Cancer Father    Dementia Mother    Diabetes Father    Heart disease Father          Tobacco Use    Smoking status: Never     Passive exposure: Never    Smokeless tobacco: Never   Substance and Sexual Activity    Alcohol use: No    Drug use: No    Sexual activity: Not Currently     Partners: Female     Review of Systems   Constitutional:  Negative for activity change, chills and fever.   HENT:  Negative for trouble swallowing.    Eyes:   Negative for photophobia and visual disturbance.   Respiratory:  Negative for chest tightness, shortness of breath and wheezing.    Cardiovascular:  Positive for leg swelling. Negative for chest pain and palpitations.   Gastrointestinal:  Negative for abdominal pain, constipation, diarrhea, nausea and vomiting.   Genitourinary:  Negative for dysuria, frequency, hematuria and urgency.   Musculoskeletal:  Negative for arthralgias, back pain and gait problem.   Skin:  Negative for color change and rash.   Neurological:  Negative for dizziness, syncope, weakness, light-headedness, numbness and headaches.   Psychiatric/Behavioral:  Negative for agitation and confusion. The patient is not nervous/anxious.      Objective:     Vital Signs (Most Recent):  Temp: 97.3 °F (36.3 °C) (05/16/25 1200)  Pulse: (!) 56 (05/16/25 1300)  Resp: 16 (05/16/25 1300)  BP: (!) 114/55 (05/16/25 1300)  SpO2: 100 % (05/16/25 1300) Vital Signs (24h Range):  Temp:  [97.3 °F (36.3 °C)-98.2 °F (36.8 °C)] 97.3 °F (36.3 °C)  Pulse:  [44-73] 56  Resp:  [14-18] 16  SpO2:  [99 %-100 %] 100 %  BP: (109-146)/(55-93) 114/55     Weight: 75.8 kg (167 lb)  Body mass index is 21.44 kg/m².     Physical Exam  Vitals and nursing note reviewed.   Constitutional:       General: He is not in acute distress.     Appearance: He is well-developed.   HENT:      Head: Normocephalic and atraumatic.      Mouth/Throat:      Pharynx: No oropharyngeal exudate.   Eyes:      Conjunctiva/sclera: Conjunctivae normal.      Pupils: Pupils are equal, round, and reactive to light.   Cardiovascular:      Rate and Rhythm: Regular rhythm. Bradycardia present.      Heart sounds: Normal heart sounds.   Pulmonary:      Effort: Pulmonary effort is normal. No respiratory distress.      Breath sounds: Normal breath sounds. No wheezing.   Abdominal:      General: Bowel sounds are normal. There is no distension.      Palpations: Abdomen is soft.      Tenderness: There is no abdominal tenderness.    Musculoskeletal:         General: No tenderness. Normal range of motion.      Cervical back: Normal range of motion and neck supple.      Right lower leg: Edema (2+ pitting edema up to knees) present.      Left lower leg: Edema (2+ pitting edema up to knees) present.   Lymphadenopathy:      Cervical: No cervical adenopathy.   Skin:     General: Skin is warm and dry.      Capillary Refill: Capillary refill takes less than 2 seconds.      Findings: No rash.   Neurological:      Mental Status: He is alert and oriented to person, place, and time.      Cranial Nerves: No cranial nerve deficit.      Sensory: No sensory deficit.      Coordination: Coordination normal.   Psychiatric:         Behavior: Behavior normal.         Thought Content: Thought content normal.         Judgment: Judgment normal.              CRANIAL NERVES     CN III, IV, VI   Pupils are equal, round, and reactive to light.       Significant Labs: All pertinent labs within the past 24 hours have been reviewed.  CBC:   Recent Labs   Lab 05/16/25  0904 05/16/25  0912   WBC 5.66  --    HGB 6.7*  --    HCT 22.5* 23*   *  --      CMP:   Recent Labs   Lab 05/16/25  0904      K 6.1*   *   CO2 17*   GLU 97   BUN 82*   CREATININE 6.8*   CALCIUM 7.8*   PROT 5.4*   ALBUMIN 2.5*   BILITOT 0.4   ALKPHOS 102   AST 21   ALT 19   ANIONGAP 11       TSH:   Recent Labs   Lab 05/07/25  0837   TSH 5.258*     Urine Studies:   Recent Labs   Lab 05/16/25  1059   COLORU Colorless*   APPEARANCEUA Clear   PHUR 6.0   SPECGRAV 1.010   PROTEINUA Negative   GLUCUA Negative   BILIRUBINUA Negative   OCCULTUA Negative   NITRITE Negative   UROBILINOGEN Negative   LEUKOCYTESUR Negative       Significant Imaging: I have reviewed all pertinent imaging results/findings within the past 24 hours.  Imaging Results              X-Ray Chest AP Portable (Final result)  Result time 05/16/25 09:54:11      Final result by Andrew Darby MD (05/16/25 09:54:11)                    Impression:      Please see discussion above.      Electronically signed by: Andrew Darby  Date:    05/16/2025  Time:    09:54               Narrative:    EXAMINATION:  XR CHEST AP PORTABLE    CLINICAL HISTORY:  hyperkalemia;    TECHNIQUE:  Single frontal view of the chest was performed.    COMPARISON:  Chest radiograph performed 04/24/2025, 08:27 hours.    FINDINGS:  Monitoring leads over the chest.  Loop recorder device.    Grossly unchanged cardiac contours    Chronic interstitial coarsening felt grossly similar when compared to prior examination performed 04/24/2025, 08:27 hours.    Some clearing at right basilar opacity seen on the 04/24/2025 radiograph.  Atelectasis versus resolving infectious or noninfectious inflammatory process considered.  Continued imaging follow-up to resolution with follow-up radiograph in 4-6 weeks would be recommended.    Persistent blunting of the costophrenic angles may relate to small effusions and or scar.    No definite pneumothorax.    No acute findings in the visualized abdomen.  Posttreatment sequela project in the upper abdomen    Osseous and soft tissue structures appear without definite acute change.

## 2025-05-16 NOTE — H&P
[facility] - Emergency Dept  [facility] Medicine  History & Physical    Patient Name: Jarrett Charlton Jr.  MRN: [id number]  Patient Class: OP- Observation  Admission Date: 5/16/2025  Attending Physician: Diane Mcclelland MD   Primary Care Provider: Olga Hampton MD         Patient information was obtained from patient, relative(s), and ER records.     Subjective:     Principal Problem:Hyperkalemia    Chief Complaint:   Chief Complaint   Patient presents with    Abnormal Labs     Told by PCP to come to ED for possibly needing dialysis r/t decreased kidney function. Potassium 6.3 four days ago        HPI: Jarrett Charlton Jr. Is a 76 yo M with T2DM, HTN, HLD, CKDIV, DVT, pAF (on eliquis), Prostate CA s/p TURP, UC s/p ileostomy, GERD, gout, HFrEF (EF 30%), autonomic dysfunction, BPH, anemia, CAD, and recent campylobacter infection c/b bacteremia who presented to ED for abnormal labs. Patient went to his PCP on 05/13 and had OP lab work at that time. His K was found to be 6.3 and Hgb 6.9. Patient seen with daughter at bedside. Reports feeling well and still making urine. At baseline, he uses a walker to ambulate and lives with his daughter. He does endorse BLE edema. Denies melena, hematochezia, or hematemesis. States that he had been taking his midodrine 10 mg four times a day to avoid hypotensive episodes. Denies fever, chills, chest pain, SOB, cough, abdominal pain, n/v/d, dysuria.    In ED: Afebrile. Bradycardic with HR 40-50s. HDS. No leukocytosis. Hgb 6.7. Blood consent obtained. Transfused 1u pRBC. K 6.1, Cr 6.8, BUN 82, serum CO2 17. Phos 6.1. Mg 1.4, replaced. Corrected Ca wnl. CXR with improving R basilar opacity and possible small bilateral pleural effusions. EKG without peaked T waves. Given IV calcium gluconate and K shifted. Nephrology consulted. Admitted to .     Past Medical History:   Diagnosis Date    Autonomic dysfunction 11/2023 11/2023 neuro note    Basal cell carcinoma     BPH (benign  prostatic hyperplasia)     s/p TURP    CAD (coronary artery disease) 2017    status post PCI to mid LAD in 2017 (LHC in 2021 showed patent stent, otherwise nonobstructive CAD)    Cancer of prostate     s/p TURP    Chronic kidney disease     DDD (degenerative disc disease) 10/21/2013    Diabetes mellitus with renal complications     Disc disease, degenerative, cervical     DVT (deep venous thrombosis)     Encounter for blood transfusion     GERD (gastroesophageal reflux disease)     History of ulcerative colitis 1982    s/p colectomy and ileostomy    HLD (hyperlipidemia)     Ileostomy in place 1982    RBBB     Squamous cell carcinoma 03/08/2018    Left superior helix near insertion    Squamous cell carcinoma 04/12/2018    Left forearm x 5    Ventricular tachycardia        Past Surgical History:   Procedure Laterality Date    ABLATION, SVT, ACCESSORY PATHWAY N/A 04/30/2024    Procedure: Ablation, SVT, Accessory Pathway;  Surgeon: Franky Taylor MD;  Location: [facility] EP LAB;  Service: Cardiology;  Laterality: N/A;  VT, RFA, Carto, MAC, GP, 3 Prep *MDT ILR in situ*    cardiac stents      CATARACT EXTRACTION Bilateral     CERVICAL FUSION      CHOLECYSTECTOMY N/A 02/14/2023    Procedure: CHOLECYSTECTOMY;  Surgeon: Mike Joyce MD;  Location: [facility] OR;  Service: General;  Laterality: N/A;  very high probabilty of converison to open    colectomy and ileostomy  1985    EGD, WITH CLOSED BIOPSY  04/04/2025    ENDOSCOPIC ULTRASOUND OF UPPER GASTROINTESTINAL TRACT N/A 02/10/2023    Procedure: ULTRASOUND, UPPER GI TRACT, ENDOSCOPIC;  Surgeon: Poli [facility] MD;  Location: [facility] ENDO;  Service: Endoscopy;  Laterality: N/A;    ERCP N/A 02/10/2023    Procedure: ERCP (ENDOSCOPIC RETROGRADE CHOLANGIOPANCREATOGRAPHY);  Surgeon: Poli [facility] MD;  Location: [facility] ENDO;  Service: Endoscopy;  Laterality: N/A;    ESOPHAGOGASTRODUODENOSCOPY N/A 4/4/2025    Procedure: EGD (ESOPHAGOGASTRODUODENOSCOPY);  Surgeon: Melania Gibson MD;   Location: [facility] ENDO;  Service: Endoscopy;  Laterality: N/A;    EXCISION OF PAROTID GLAND Left 12/18/2020    Procedure: EXCISION, PAROTID GLAND;  Surgeon: Michael Pinzon MD;  Location: [facility] OR 2ND FLR;  Service: ENT;  Laterality: Left;    INSERTION OF IMPLANTABLE LOOP RECORDER  06/07/2021    INSERTION OF IMPLANTABLE LOOP RECORDER Left 06/07/2021    Procedure: INSERTION, IMPLANTABLE LOOP RECORDER;  Surgeon: Romario Dao MD;  Location: [facility] CATH LAB;  Service: Cardiology;  Laterality: Left;    LEFT HEART CATHETERIZATION Right 04/15/2021    Procedure: CATHETERIZATION, HEART, LEFT;  Surgeon: Marcio Jones MD;  Location: [facility] CATH LAB;  Service: Cardiology;  Laterality: Right;    LYSIS OF ADHESIONS N/A 11/09/2020    Procedure: LYSIS, ADHESIONS,  ERAS low;  Surgeon: CED Haley MD;  Location: [facility] OR 2ND FLR;  Service: Colon and Rectal;  Laterality: N/A;    LYSIS OF ADHESIONS N/A 02/14/2023    Procedure: LYSIS, ADHESIONS;  Surgeon: Mike Joyce MD;  Location: [facility];  Service: General;  Laterality: N/A;    POUCHOSCOPY N/A 04/06/2022    Procedure: ENDOSCOPY, POUCH, SMALL INTESTINE, DIAGNOSTIC;  Surgeon: Dieter Juarez MD;  Location: [facility] ENDO (2ND FLR);  Service: Endoscopy;  Laterality: N/A;    REPAIR, HERNIA, PARASTOMAL N/A 11/09/2020    Procedure: REPAIR, HERNIA, PARASTOMAL;  Surgeon: CED Haley MD;  Location: [facility] OR 2ND FLR;  Service: Colon and Rectal;  Laterality: N/A;    TRANSURETHRAL RESECTION OF PROSTATE (TURP) WITHOUT USE OF LASER N/A 01/23/2019    Procedure: TURP, WITHOUT USING LASER BIPOLAR;  Surgeon: Catarino Mota MD;  Location: [facility] 1ST FLR;  Service: Urology;  Laterality: N/A;  1.5 HOURS    TREATMENT OF CARDIAC ARRHYTHMIA  04/30/2024    Procedure: Cardioversion or Defibrillation;  Surgeon: Franky Taylor MD;  Location: [facility] EP LAB;  Service: Cardiology;;       Review of patient's allergies indicates:   Allergen Reactions    Atorvastatin     Rosuvastatin         Current Facility-Administered Medications on File Prior to Encounter   Medication    leuprolide acetate (6 month) injection 45 mg    leuprolide acetate (6 month) injection 45 mg    sodium chloride 0.9% in 1,000 mL infusion     Current Outpatient Medications on File Prior to Encounter   Medication Sig    ACCU-CHEK SOFTCLIX LANCETS [facility] TEST BLOOD SUGAR THREE TIMES DAILY    albuterol (PROVENTIL) 2.5 mg /3 mL (0.083 %) nebulizer solution Take 3 mLs (2.5 mg total) by nebulization every 6 (six) hours as needed for Wheezing or Shortness of Breath. Rescue (Patient not taking: Reported on 5/13/2025)    alcohol swabs (DROPSAFE ALCOHOL PREP PADS) PadM Apply 1 each topically once daily.    allopurinoL (ZYLOPRIM) 100 MG tablet Take 4 tablets (400 mg total) by mouth once daily.    amiodarone (PACERONE) 200 MG Tab Take 1 tablet (200 mg total) by mouth once daily.    apixaban (ELIQUIS) 5 mg Tab Take 1 tablet (5 mg total) by mouth 2 (two) times daily.    aspirin (ECOTRIN) 81 MG EC tablet Take 81 mg by mouth once daily.    azelastine (ASTELIN) 137 mcg (0.1 %) nasal spray 1 spray (137 mcg total) by Nasal route 2 (two) times daily.    blood sugar diagnostic (ACCU-CHEK GUIDE TEST STRIPS) Strp 1 each by Other route 3 (three) times daily. Test Blood Sugar    blood-glucose meter (ACCU-CHEK GUIDE GLUCOSE METER) [facility] Accucheck BID    brimonidine 0.2% (ALPHAGAN) 0.2 % Drop Place 1 drop into both eyes 2 (two) times a day.    colchicine (COLCRYS) 0.6 mg tablet Take 1 tablet (0.6 mg total) by mouth every other day.    ferrous sulfate (IRON) 325 mg (65 mg iron) Tab tablet Take 1 tablet (325 mg total) by mouth every other day.    [Paused] furosemide (LASIX) 20 MG tablet Take 20 mg by mouth once daily.    gabapentin (NEURONTIN) 100 MG capsule Take 2 capsules (200 mg total) by mouth every evening.    glimepiride (AMARYL) 4 MG tablet Take 1 tablet (4 mg total) by mouth daily with breakfast.    leuprolide acetate, 6 month, (LUPRON) 45 mg SyKt  injection Inject 45 mg into the muscle every 6 (six) months. for 4 doses    levothyroxine (SYNTHROID) 25 MCG tablet Take 1 tablet (25 mcg total) by mouth before breakfast. (Patient not taking: Reported on 5/13/2025)    levothyroxine (SYNTHROID) 25 MCG tablet Take 1 tablet (25 mcg total) by mouth before breakfast. (Patient not taking: Reported on 5/13/2025)    magnesium oxide (MAGOX) 400 mg (241.3 mg magnesium) tablet Take 1 tablet (400 mg total) by mouth once daily.    meclizine (ANTIVERT) 25 mg tablet Take 1 tablet (25 mg total) by mouth 3 (three) times daily as needed for Dizziness.    metoprolol succinate (TOPROL-XL) 25 MG 24 hr tablet Take 0.5 tablets (12.5 mg total) by mouth once daily.    midodrine (PROAMATINE) 10 MG tablet Take 1 tablet (10 mg total) by mouth 3 (three) times daily with meals.    pantoprazole (PROTONIX) 40 MG tablet Take 40 mg by mouth once daily.    potassium chloride SA (K-DUR,KLOR-CON) 20 MEQ tablet Take 20 mEq by mouth once daily.    pravastatin (PRAVACHOL) 40 MG tablet Take 1 tablet (40 mg total) by mouth once daily.    predniSONE (DELTASONE) 2.5 MG tablet Take 2.5 mg by mouth once daily.    tamsulosin (FLOMAX) 0.4 mg Cap Take 1 capsule (0.4 mg total) by mouth once daily.    vit A/C/E ac/ZnOx/cupric oxide (EYE VITAMIN AND MINERALS ORAL) Take 1 tablet by mouth every morning.     Family History       Problem Relation (Age of Onset)    Cancer Father    Dementia Mother    Diabetes Father    Heart disease Father          Tobacco Use    Smoking status: Never     Passive exposure: Never    Smokeless tobacco: Never   Substance and Sexual Activity    Alcohol use: No    Drug use: No    Sexual activity: Not Currently     Partners: Female     Review of Systems   Constitutional:  Negative for activity change, chills and fever.   HENT:  Negative for trouble swallowing.    Eyes:  Negative for photophobia and visual disturbance.   Respiratory:  Negative for chest tightness, shortness of breath and  wheezing.    Cardiovascular:  Positive for leg swelling. Negative for chest pain and palpitations.   Gastrointestinal:  Negative for abdominal pain, constipation, diarrhea, nausea and vomiting.   Genitourinary:  Negative for dysuria, frequency, hematuria and urgency.   Musculoskeletal:  Negative for arthralgias, back pain and gait problem.   Skin:  Negative for color change and rash.   Neurological:  Negative for dizziness, syncope, weakness, light-headedness, numbness and headaches.   Psychiatric/Behavioral:  Negative for agitation and confusion. The patient is not nervous/anxious.      Objective:     Vital Signs (Most Recent):  Temp: 97.3 °F (36.3 °C) (05/16/25 1200)  Pulse: (!) 56 (05/16/25 1300)  Resp: 16 (05/16/25 1300)  BP: (!) 114/55 (05/16/25 1300)  SpO2: 100 % (05/16/25 1300) Vital Signs (24h Range):  Temp:  [97.3 °F (36.3 °C)-98.2 °F (36.8 °C)] 97.3 °F (36.3 °C)  Pulse:  [44-73] 56  Resp:  [14-18] 16  SpO2:  [99 %-100 %] 100 %  BP: (109-146)/(55-93) 114/55     Weight: 75.8 kg (167 lb)  Body mass index is 21.44 kg/m².     Physical Exam  Vitals and nursing note reviewed.   Constitutional:       General: He is not in acute distress.     Appearance: He is well-developed.   HENT:      Head: Normocephalic and atraumatic.      Mouth/Throat:      Pharynx: No oropharyngeal exudate.   Eyes:      Conjunctiva/sclera: Conjunctivae normal.      Pupils: Pupils are equal, round, and reactive to light.   Cardiovascular:      Rate and Rhythm: Regular rhythm. Bradycardia present.      Heart sounds: Normal heart sounds.   Pulmonary:      Effort: Pulmonary effort is normal. No respiratory distress.      Breath sounds: Normal breath sounds. No wheezing.   Abdominal:      General: Bowel sounds are normal. There is no distension.      Palpations: Abdomen is soft.      Tenderness: There is no abdominal tenderness.   Musculoskeletal:         General: No tenderness. Normal range of motion.      Cervical back: Normal range of motion  and neck supple.      Right lower leg: Edema (2+ pitting edema up to knees) present.      Left lower leg: Edema (2+ pitting edema up to knees) present.   Lymphadenopathy:      Cervical: No cervical adenopathy.   Skin:     General: Skin is warm and dry.      Capillary Refill: Capillary refill takes less than 2 seconds.      Findings: No rash.   Neurological:      Mental Status: He is alert and oriented to person, place, and time.      Cranial Nerves: No cranial nerve deficit.      Sensory: No sensory deficit.      Coordination: Coordination normal.   Psychiatric:         Behavior: Behavior normal.         Thought Content: Thought content normal.         Judgment: Judgment normal.              CRANIAL NERVES     CN III, IV, VI   Pupils are equal, round, and reactive to light.       Significant Labs: All pertinent labs within the past 24 hours have been reviewed.  CBC:   Recent Labs   Lab 05/16/25  0904 05/16/25  0912   WBC 5.66  --    HGB 6.7*  --    HCT 22.5* 23*   *  --      CMP:   Recent Labs   Lab 05/16/25  0904      K 6.1*   *   CO2 17*   GLU 97   BUN 82*   CREATININE 6.8*   CALCIUM 7.8*   PROT 5.4*   ALBUMIN 2.5*   BILITOT 0.4   ALKPHOS 102   AST 21   ALT 19   ANIONGAP 11       TSH:   Recent Labs   Lab 05/07/25  0837   TSH 5.258*     Urine Studies:   Recent Labs   Lab 05/16/25  1059   COLORU Colorless*   APPEARANCEUA Clear   PHUR 6.0   SPECGRAV 1.010   PROTEINUA Negative   GLUCUA Negative   BILIRUBINUA Negative   OCCULTUA Negative   NITRITE Negative   UROBILINOGEN Negative   LEUKOCYTESUR Negative       Significant Imaging: I have reviewed all pertinent imaging results/findings within the past 24 hours.  Imaging Results              X-Ray Chest AP Portable (Final result)  Result time 05/16/25 09:54:11      Final result by Andrew Darby MD (05/16/25 09:54:11)                   Impression:      Please see discussion above.      Electronically signed by: Andrew  Wilner  Date:    05/16/2025  Time:    09:54               Narrative:    EXAMINATION:  XR CHEST AP PORTABLE    CLINICAL HISTORY:  hyperkalemia;    TECHNIQUE:  Single frontal view of the chest was performed.    COMPARISON:  Chest radiograph performed 04/24/2025, 08:27 hours.    FINDINGS:  Monitoring leads over the chest.  Loop recorder device.    Grossly unchanged cardiac contours    Chronic interstitial coarsening felt grossly similar when compared to prior examination performed 04/24/2025, 08:27 hours.    Some clearing at right basilar opacity seen on the 04/24/2025 radiograph.  Atelectasis versus resolving infectious or noninfectious inflammatory process considered.  Continued imaging follow-up to resolution with follow-up radiograph in 4-6 weeks would be recommended.    Persistent blunting of the costophrenic angles may relate to small effusions and or scar.    No definite pneumothorax.    No acute findings in the visualized abdomen.  Posttreatment sequela project in the upper abdomen    Osseous and soft tissue structures appear without definite acute change.                                    Assessment/Plan:     Assessment & Plan  Hyperkalemia  Hyperkalemia is likely due to ESRD and use of K supplements.The patients most recent potassium results are listed below.  Recent Labs     05/16/25  0904   K 6.1*     Plan  - Monitor for arrhythmias with EKG and/or continuous telemetry.   - Treat the hyperkalemia with Potassium Binders, Calcium gluconate, IV insulin and dextrose, Nebulized albuterol sulfate, Furosemide, and Sodium Bicarbonate.   - Monitor potassium: Every 12 hours  - The patient's hyperkalemia is improving  Acute renal failure with acute tubular necrosis superimposed on stage 5 chronic kidney disease, not on chronic dialysis  GRADY is likely due to unsure, will obtain renal studies. Possibly just worsening of his kidney function. Baseline creatinine is 4-5. Most recent creatinine and eGFR are listed  below.  Recent Labs     05/16/25  0904   CREATININE 6.8*   EGFRNORACEVR 8*      Plan  - GRADY is worsening. Will adjust treatment as follows:    - Avoid nephrotoxins and renally dose meds for GFR listed above  - Monitor urine output, serial BMP, and adjust therapy as needed  - obtain renal studies  - Nephrology consulted; appreciate recs   - will hold on TDC for now since patient making adequate amount of urine   - will give additional dose of IV lasix this evening   History of anemia due to CKD  Anemia is likely due to chronic disease due to Chronic Kidney Disease. Most recent hemoglobin and hematocrit are listed below.  Recent Labs     05/16/25  0904 05/16/25  0912   HGB 6.7*  --    HCT 22.5* 23*     Plan  - Monitor serial CBC: Daily  - Transfuse PRBC if patient becomes hemodynamically unstable, symptomatic or H/H drops below 7/21.  - Patient has received 1 units of PRBCs on 05/16  - Patient's anemia is currently stable  T2DM (type 2 diabetes mellitus)  Patient's FSGs are controlled on current medication regimen.  Last A1c reviewed-   Lab Results   Component Value Date    HGBA1C 5.7 (H) 05/07/2025     Most recent fingerstick glucose reviewed-   Recent Labs   Lab 05/16/25  1157   POCTGLUCOSE 222*     Current correctional scale  Low  Maintain anti-hyperglycemic dose as follows-   Antihyperglycemics (From admission, onward)      Start     Stop Route Frequency Ordered    05/16/25 1155  insulin aspart U-100 pen 0-5 Units         -- SubQ Before meals & nightly PRN 05/16/25 1056          Hold Oral hypoglycemics (glimepiride) while patient is in the hospital.  - diabetic diet   Essential hypertension  Patient's blood pressure range in the last 24 hours was: BP  Min: 109/57  Max: 146/67.The patient's inpatient anti-hypertensive regimen is listed below:  Current Antihypertensives  metoprolol succinate (TOPROL-XL) 24 hr split tablet 12.5 mg, Daily, Oral  furosemide injection 80 mg, Once, Intravenous    Plan  - BP is  controlled, no changes needed to their regimen  - hx of autonomic dysfunction, continue midodrine   HLD (hyperlipidemia)  - continue statin   BPH (benign prostatic hyperplasia)  - s/p TURP  - continue flomax   GERD (gastroesophageal reflux disease)  - continue ppi   History of ulcerative colitis  - s/p colectomy and ileostomy   - no changes in ostomy output recently   CAD (coronary artery disease)  Patient with known CAD s/p stent placement, which is controlled Will continue ASA and Statin and monitor for S/Sx of angina/ACS. Continue to monitor on telemetry.   Orthostatic hypotension  - hx of autonomic dysfuntion  - continue midodrine 10 mg TID   History of gout  - continue allopurinol  - recommend stopping colchicine 2/2 kidney function   Metabolic acidosis  - start sodium bicarb 650 mg BID   Prostate cancer  - s/p TURP  - receives leuprolide injections q6months   - follows closely with urology   Chronic combined systolic and diastolic heart failure  - last echo 02/2025 with EF 30-35%, indeterminate LV diastolic function   - he appears hypervolemic on exam with BLE edema  - CXR concerning for small pleural effusion, but no rales on exam  - will obtain BNP  - continue BB  - Monitor strict Is&Os and daily weights.    - Place on fluid restriction of 1.5 L.   - monitor tele  Chronic deep vein thrombosis (DVT)  - continue eliquis   PAF (paroxysmal atrial fibrillation)  Patient has paroxysmal (<7 days) atrial fibrillation. Patient is currently in sinus rhythm. [id number] Score: 4. The patients heart rate in the last 24 hours is as follows:  Pulse  Min: 44  Max: 73     Antiarrhythmics  amiodarone tablet 200 mg, Daily, Oral  metoprolol succinate (TOPROL-XL) 24 hr split tablet 12.5 mg, Daily, Oral    Anticoagulants  apixaban tablet 5 mg, 2 times daily, Oral    Plan  - Replete lytes with a goal of K>4, Mg >2  - Patient is anticoagulated, see medications listed above.  - Patient's afib is currently  controlled  Hypomagnesemia  Patient has Abnormal Magnesium: hypomagnesemia. Will continue to monitor electrolytes closely. Will replace the affected electrolytes and repeat labs to be done after interventions completed. The patient's magnesium results have been reviewed and are listed below.  Recent Labs   Lab 05/16/25  0955   MG 1.4*      VTE Risk Mitigation (From admission, onward)           Ordered     apixaban tablet 5 mg  2 times daily         05/16/25 1341     IP VTE HIGH RISK PATIENT  Once         05/16/25 1056     Reason for No Pharmacological VTE Prophylaxis  Once        Question:  Reasons:  Answer:  Already adequately anticoagulated on oral Anticoagulants    05/16/25 1056                         On 05/16/2025, patient should be placed in hospital observation services under my care in collaboration with Dr. Diane Mcclelland.           Rebecca Campos PA-C  Department of Hospital Medicine  [facility] - Emergency Dept

## 2025-05-17 LAB
ANION GAP (OHS): 12 MMOL/L (ref 8–16)
BNP SERPL-MCNC: 1302 PG/ML (ref 0–99)
BUN SERPL-MCNC: 80 MG/DL (ref 8–23)
CALCIUM SERPL-MCNC: 7.8 MG/DL (ref 8.7–10.5)
CHLORIDE SERPL-SCNC: 114 MMOL/L (ref 95–110)
CO2 SERPL-SCNC: 18 MMOL/L (ref 23–29)
CREAT SERPL-MCNC: 6.6 MG/DL (ref 0.5–1.4)
ERYTHROCYTE [DISTWIDTH] IN BLOOD BY AUTOMATED COUNT: 19.2 % (ref 11.5–14.5)
GFR SERPLBLD CREATININE-BSD FMLA CKD-EPI: 8 ML/MIN/1.73/M2
GLUCOSE SERPL-MCNC: 103 MG/DL (ref 70–110)
HCT VFR BLD AUTO: 23.3 % (ref 40–54)
HGB BLD-MCNC: 7.3 GM/DL (ref 14–18)
MAGNESIUM SERPL-MCNC: 1.6 MG/DL (ref 1.6–2.6)
MCH RBC QN AUTO: 30.4 PG (ref 27–31)
MCHC RBC AUTO-ENTMCNC: 31.3 G/DL (ref 32–36)
MCV RBC AUTO: 97 FL (ref 82–98)
PHOSPHATE SERPL-MCNC: 7.2 MG/DL (ref 2.7–4.5)
PLATELET # BLD AUTO: 112 K/UL (ref 150–450)
PMV BLD AUTO: 9.9 FL (ref 9.2–12.9)
POCT GLUCOSE: 120 MG/DL (ref 70–110)
POCT GLUCOSE: 186 MG/DL (ref 70–110)
POCT GLUCOSE: 216 MG/DL (ref 70–110)
POCT GLUCOSE: 216 MG/DL (ref 70–110)
POTASSIUM SERPL-SCNC: 5 MMOL/L (ref 3.5–5.1)
RBC # BLD AUTO: 2.4 M/UL (ref 4.6–6.2)
SODIUM SERPL-SCNC: 144 MMOL/L (ref 136–145)
WBC # BLD AUTO: 5.23 K/UL (ref 3.9–12.7)

## 2025-05-17 PROCEDURE — 96366 THER/PROPH/DIAG IV INF ADDON: CPT

## 2025-05-17 PROCEDURE — 63600175 PHARM REV CODE 636 W HCPCS: Mod: HCNC

## 2025-05-17 PROCEDURE — 96372 THER/PROPH/DIAG INJ SC/IM: CPT

## 2025-05-17 PROCEDURE — 25000003 PHARM REV CODE 250: Mod: HCNC

## 2025-05-17 PROCEDURE — 85027 COMPLETE CBC AUTOMATED: CPT | Mod: HCNC

## 2025-05-17 PROCEDURE — 96365 THER/PROPH/DIAG IV INF INIT: CPT | Mod: 59

## 2025-05-17 PROCEDURE — G0378 HOSPITAL OBSERVATION PER HR: HCPCS | Mod: HCNC

## 2025-05-17 PROCEDURE — 36415 COLL VENOUS BLD VENIPUNCTURE: CPT | Mod: HCNC

## 2025-05-17 PROCEDURE — 83880 ASSAY OF NATRIURETIC PEPTIDE: CPT | Mod: HCNC

## 2025-05-17 PROCEDURE — 80048 BASIC METABOLIC PNL TOTAL CA: CPT | Mod: HCNC

## 2025-05-17 PROCEDURE — 83735 ASSAY OF MAGNESIUM: CPT | Mod: HCNC

## 2025-05-17 PROCEDURE — 84100 ASSAY OF PHOSPHORUS: CPT | Mod: HCNC

## 2025-05-17 RX ORDER — PREDNISONE 5 MG/1
5 TABLET ORAL DAILY
Status: DISCONTINUED | OUTPATIENT
Start: 2025-05-21 | End: 2025-05-18 | Stop reason: HOSPADM

## 2025-05-17 RX ORDER — MAGNESIUM SULFATE HEPTAHYDRATE 40 MG/ML
2 INJECTION, SOLUTION INTRAVENOUS ONCE
Status: COMPLETED | OUTPATIENT
Start: 2025-05-17 | End: 2025-05-17

## 2025-05-17 RX ORDER — PREDNISONE 10 MG/1
10 TABLET ORAL DAILY
Status: DISCONTINUED | OUTPATIENT
Start: 2025-05-19 | End: 2025-05-18 | Stop reason: HOSPADM

## 2025-05-17 RX ORDER — PREDNISONE 2.5 MG/1
2.5 TABLET ORAL DAILY
Status: DISCONTINUED | OUTPATIENT
Start: 2025-05-23 | End: 2025-05-18 | Stop reason: HOSPADM

## 2025-05-17 RX ORDER — PREDNISONE 20 MG/1
20 TABLET ORAL DAILY
Status: COMPLETED | OUTPATIENT
Start: 2025-05-17 | End: 2025-05-18

## 2025-05-17 RX ORDER — LIDOCAINE 50 MG/G
1 PATCH TOPICAL
Status: DISCONTINUED | OUTPATIENT
Start: 2025-05-17 | End: 2025-05-18 | Stop reason: HOSPADM

## 2025-05-17 RX ADMIN — BRIMONIDINE TARTRATE 1 DROP: 2 SOLUTION OPHTHALMIC at 08:05

## 2025-05-17 RX ADMIN — MIDODRINE HYDROCHLORIDE 10 MG: 5 TABLET ORAL at 08:05

## 2025-05-17 RX ADMIN — SODIUM BICARBONATE 650 MG: 650 TABLET ORAL at 08:05

## 2025-05-17 RX ADMIN — MUSCLE RUB CREAM: 100; 150 CREAM TOPICAL at 03:05

## 2025-05-17 RX ADMIN — ACETAMINOPHEN 1000 MG: 500 TABLET ORAL at 08:05

## 2025-05-17 RX ADMIN — LIDOCAINE 1 PATCH: 50 PATCH CUTANEOUS at 03:05

## 2025-05-17 RX ADMIN — GABAPENTIN 200 MG: 100 CAPSULE ORAL at 08:05

## 2025-05-17 RX ADMIN — AMIODARONE HYDROCHLORIDE 200 MG: 200 TABLET ORAL at 08:05

## 2025-05-17 RX ADMIN — ALLOPURINOL 300 MG: 300 TABLET ORAL at 08:05

## 2025-05-17 RX ADMIN — INSULIN ASPART 1 UNITS: 100 INJECTION, SOLUTION INTRAVENOUS; SUBCUTANEOUS at 08:05

## 2025-05-17 RX ADMIN — LEVOTHYROXINE SODIUM 25 MCG: 0.03 TABLET ORAL at 05:05

## 2025-05-17 RX ADMIN — ASPIRIN 81 MG: 81 TABLET, COATED ORAL at 08:05

## 2025-05-17 RX ADMIN — MAGNESIUM SULFATE HEPTAHYDRATE 2 G: 40 INJECTION, SOLUTION INTRAVENOUS at 10:05

## 2025-05-17 RX ADMIN — FERROUS SULFATE TAB EC 325 MG (65 MG FE EQUIVALENT) 1 EACH: 325 (65 FE) TABLET DELAYED RESPONSE at 08:05

## 2025-05-17 RX ADMIN — TAMSULOSIN HYDROCHLORIDE 0.4 MG: 0.4 CAPSULE ORAL at 08:05

## 2025-05-17 RX ADMIN — METOPROLOL SUCCINATE 12.5 MG: 25 TABLET, EXTENDED RELEASE ORAL at 08:05

## 2025-05-17 RX ADMIN — Medication 400 MG: at 08:05

## 2025-05-17 RX ADMIN — SODIUM ZIRCONIUM CYCLOSILICATE 10 G: 10 POWDER, FOR SUSPENSION ORAL at 03:05

## 2025-05-17 RX ADMIN — SODIUM ZIRCONIUM CYCLOSILICATE 10 G: 10 POWDER, FOR SUSPENSION ORAL at 08:05

## 2025-05-17 RX ADMIN — PRAVASTATIN SODIUM 40 MG: 40 TABLET ORAL at 08:05

## 2025-05-17 RX ADMIN — INSULIN ASPART 2 UNITS: 100 INJECTION, SOLUTION INTRAVENOUS; SUBCUTANEOUS at 04:05

## 2025-05-17 RX ADMIN — APIXABAN 5 MG: 5 TABLET, FILM COATED ORAL at 08:05

## 2025-05-17 RX ADMIN — PREDNISONE 20 MG: 20 TABLET ORAL at 04:05

## 2025-05-17 RX ADMIN — ACETAMINOPHEN 1000 MG: 500 TABLET ORAL at 11:05

## 2025-05-17 NOTE — ASSESSMENT & PLAN NOTE
Hyperkalemia is likely due to ESRD and use of K supplements.The patients most recent potassium results are listed below.  Recent Labs     05/16/25  0904 05/16/25  1600 05/17/25  0310   K 6.1* 4.5  5.0 5.0     Plan  - Monitor for arrhythmias with EKG and/or continuous telemetry.   - Treat the hyperkalemia with Potassium Binders, Calcium gluconate, IV insulin and dextrose, Nebulized albuterol sulfate, Furosemide, and Sodium Bicarbonate.   - Monitor potassium: Every 12 hours  - The patient's hyperkalemia is improving

## 2025-05-17 NOTE — ASSESSMENT & PLAN NOTE
Patient's FSGs are controlled on current medication regimen.  Last A1c reviewed-   Lab Results   Component Value Date    HGBA1C 5.7 (H) 05/07/2025     Most recent fingerstick glucose reviewed-   Recent Labs   Lab 05/16/25 2025 05/17/25  0623 05/17/25  1210 05/17/25  1614   POCTGLUCOSE 219* 120* 186* 216*     Current correctional scale  Low  Maintain anti-hyperglycemic dose as follows-   Antihyperglycemics (From admission, onward)      Start     Stop Route Frequency Ordered    05/16/25 1155  insulin aspart U-100 pen 0-5 Units         -- SubQ Before meals & nightly PRN 05/16/25 1056          Hold Oral hypoglycemics (glimepiride) while patient is in the hospital.  - diabetic diet

## 2025-05-17 NOTE — HOSPITAL COURSE
Jarrett Charlton Jr. Is a 76 yo M with T2DM, HTN, HLD, CKDIV, DVT, pAF (on eliquis), Prostate CA s/p TURP, UC s/p ileostomy, GERD, gout, HFrEF (EF 30%), autonomic dysfunction, BPH, anemia, CAD, and recent campylobacter infection c/b bacteremia now resolved who was sent to the  ED by PCP for k of 6.3 and Hgb 6.9. No acute source or signs of bleeding GI or otherwise. S/p 1 UPRBC on 05/16 in the  ED. Nephrology consulted for hyperkalemia and discussion about potential HD intiation. Hyper K treated with insulin shift, IV Ca, and IV lasix 80 x2 with improvement.   Started to have typical left knee gout pain and was started on his usual 20mg prednisone and taper.

## 2025-05-17 NOTE — ASSESSMENT & PLAN NOTE
Patient has Abnormal Magnesium: hypomagnesemia. Will continue to monitor electrolytes closely. Will replace the affected electrolytes and repeat labs to be done after interventions completed. The patient's magnesium results have been reviewed and are listed below.  Recent Labs   Lab 05/17/25  0310   MG 1.6

## 2025-05-17 NOTE — PLAN OF CARE
Plan of care discussed with patient. Patient is free of fall/trauma/injury. Denies CP, SOB, or pain/discomfort. Potassium binder given.  AAO x 4 and independent. Pt using home ostomy supplies to care for his ileostomy. All questions addressed. Will continue to monitor.    Problem: Adult Inpatient Plan of Care  Goal: Plan of Care Review  Outcome: Progressing     Problem: Adult Inpatient Plan of Care  Goal: Patient-Specific Goal (Individualized)  Outcome: Progressing     Problem: Adult Inpatient Plan of Care  Goal: Absence of Hospital-Acquired Illness or Injury  Outcome: Progressing     Problem: Adult Inpatient Plan of Care  Goal: Optimal Comfort and Wellbeing  Outcome: Progressing     Problem: Adult Inpatient Plan of Care  Goal: Readiness for Transition of Care  Outcome: Progressing

## 2025-05-17 NOTE — SUBJECTIVE & OBJECTIVE
Interval History:     K improved. Continuing lokelma.     Pain in left knee similar to previous gout flares. Cannot use home colchicine but has used 20mg prednisone in the past for acute flares followed by 2.5mg per patient and confirmed by chart review. Prednisone taper initiated.         Review of Systems  Objective:     Vital Signs (Most Recent):  Temp: 97.9 °F (36.6 °C) (05/17/25 1558)  Pulse: (!) 57 (05/17/25 1558)  Resp: 18 (05/17/25 1558)  BP: 139/69 (05/17/25 1558)  SpO2: 97 % (05/17/25 1558) Vital Signs (24h Range):  Temp:  [97.2 °F (36.2 °C)-98.6 °F (37 °C)] 97.9 °F (36.6 °C)  Pulse:  [54-69] 57  Resp:  [18] 18  SpO2:  [97 %-100 %] 97 %  BP: (123-177)/(65-81) 139/69     Weight: 75.8 kg (167 lb)  Body mass index is 21.44 kg/m².    Intake/Output Summary (Last 24 hours) at 5/17/2025 1732  Last data filed at 5/17/2025 1624  Gross per 24 hour   Intake 240 ml   Output 1450 ml   Net -1210 ml         Physical Exam      HENT:      Head: Normocephalic.   Cardiovascular:      Rate and Rhythm: Regular rhythm.      Heart sounds: Normal heart sounds.   Pulmonary:      Breath sounds: Normal breath sounds.   Abdominal:      Palpations: Abdomen is soft.   Musculoskeletal:         General: Swelling present.   Skin:     General: Skin is warm.   Neurological:      General: No focal deficit present.      Mental Status: He is alert.   Psychiatric:         Mood and Affect: Mood normal.     Significant Labs: All pertinent labs within the past 24 hours have been reviewed.    Significant Imaging: I have reviewed all pertinent imaging results/findings within the past 24 hours.

## 2025-05-17 NOTE — PROGRESS NOTES
[facility] - Cardiology [facility] Medicine  Progress Note    Patient Name: Jarrett Charlton Jr.  MRN: [id number]  Patient Class: OP- Observation   Admission Date: 5/16/2025  Length of Stay: 0 days  Attending Physician: Diane Mcclelland MD  Primary Care Provider: Olga Hampton MD        Subjective     Principal Problem:Hyperkalemia        HPI:  Jarrett Charlton Jr. Is a 74 yo M with T2DM, HTN, HLD, CKDIV, DVT, pAF (on eliquis), Prostate CA s/p TURP, UC s/p ileostomy, GERD, gout, HFrEF (EF 30%), autonomic dysfunction, BPH, anemia, CAD, and recent campylobacter infection c/b bacteremia who presented to ED for abnormal labs. Patient went to his PCP on 05/13 and had OP lab work at that time. His K was found to be 6.3 and Hgb 6.9. Patient seen with daughter at bedside. Reports feeling well and still making urine. At baseline, he uses a walker to ambulate and lives with his daughter. He does endorse BLE edema. Denies melena, hematochezia, or hematemesis. States that he had been taking his midodrine 10 mg four times a day to avoid hypotensive episodes. Denies fever, chills, chest pain, SOB, cough, abdominal pain, n/v/d, dysuria.    In ED: Afebrile. Bradycardic with HR 40-50s. HDS. No leukocytosis. Hgb 6.7. Blood consent obtained. Transfused 1u pRBC. K 6.1, Cr 6.8, BUN 82, serum CO2 17. Phos 6.1. Mg 1.4, replaced. Corrected Ca wnl. CXR with improving R basilar opacity and possible small bilateral pleural effusions. EKG without peaked T waves. Given IV calcium gluconate and K shifted. Nephrology consulted. Admitted to .     Overview/Hospital Course:  Jarrett Charlton Jr. Is a 74 yo M with T2DM, HTN, HLD, CKDIV, DVT, pAF (on eliquis), Prostate CA s/p TURP, UC s/p ileostomy, GERD, gout, HFrEF (EF 30%), autonomic dysfunction, BPH, anemia, CAD, and recent campylobacter infection c/b bacteremia now resolved who was sent to the  ED by PCP for k of 6.3 and Hgb 6.9. No acute source or signs of bleeding GI or otherwise.  S/p 1 UPRBC on 05/16 in the  ED. Nephrology consulted for hyperkalemia and discussion about potential HD intiation. Hyper K treated with insulin shift, IV Ca, and IV lasix 80 x2 with improvement.   Started to have typical left knee gout pain and was started on his usual 20mg prednisone and taper.     Interval History:     K improved. Continuing lokelma.     Pain in left knee similar to previous gout flares. Cannot use home colchicine but has used 20mg prednisone in the past for acute flares followed by 2.5mg per patient and confirmed by chart review. Prednisone taper initiated.         Review of Systems  Objective:     Vital Signs (Most Recent):  Temp: 97.9 °F (36.6 °C) (05/17/25 1558)  Pulse: (!) 57 (05/17/25 1558)  Resp: 18 (05/17/25 1558)  BP: 139/69 (05/17/25 1558)  SpO2: 97 % (05/17/25 1558) Vital Signs (24h Range):  Temp:  [97.2 °F (36.2 °C)-98.6 °F (37 °C)] 97.9 °F (36.6 °C)  Pulse:  [54-69] 57  Resp:  [18] 18  SpO2:  [97 %-100 %] 97 %  BP: (123-177)/(65-81) 139/69     Weight: 75.8 kg (167 lb)  Body mass index is 21.44 kg/m².    Intake/Output Summary (Last 24 hours) at 5/17/2025 1732  Last data filed at 5/17/2025 1624  Gross per 24 hour   Intake 240 ml   Output 1450 ml   Net -1210 ml         Physical Exam      HENT:      Head: Normocephalic.   Cardiovascular:      Rate and Rhythm: Regular rhythm.      Heart sounds: Normal heart sounds.   Pulmonary:      Breath sounds: Normal breath sounds.   Abdominal:      Palpations: Abdomen is soft.   Musculoskeletal:         General: Swelling present.   Skin:     General: Skin is warm.   Neurological:      General: No focal deficit present.      Mental Status: He is alert.   Psychiatric:         Mood and Affect: Mood normal.     Significant Labs: All pertinent labs within the past 24 hours have been reviewed.    Significant Imaging: I have reviewed all pertinent imaging results/findings within the past 24 hours.      Assessment & Plan  Hyperkalemia  Hyperkalemia is likely  due to ESRD and use of K supplements.The patients most recent potassium results are listed below.  Recent Labs     05/16/25  0904 05/16/25 1600 05/17/25  0310   K 6.1* 4.5  5.0 5.0     Plan  - Monitor for arrhythmias with EKG and/or continuous telemetry.   - Treat the hyperkalemia with Potassium Binders, Calcium gluconate, IV insulin and dextrose, Nebulized albuterol sulfate, Furosemide, and Sodium Bicarbonate.   - Monitor potassium: Every 12 hours  - The patient's hyperkalemia is improving  Acute renal failure with acute tubular necrosis superimposed on stage 5 chronic kidney disease, not on chronic dialysis  GRADY is likely due to unsure, will obtain renal studies. Possibly just worsening of his kidney function. Baseline creatinine is 4-5. Most recent creatinine and eGFR are listed below.  Recent Labs     05/16/25  0904 05/16/25 1600 05/17/25 0310   CREATININE 6.8* 5.7*  6.2* 6.6*   EGFRNORACEVR 8* 10*  9* 8*      Plan  - GRADY is worsening. Will adjust treatment as follows:    - Avoid nephrotoxins and renally dose meds for GFR listed above  - Monitor urine output, serial BMP, and adjust therapy as needed  - obtain renal studies  - Nephrology consulted; appreciate recs   - pending decision on HD initiation inpatient.   History of anemia due to CKD  Anemia is likely due to chronic disease due to Chronic Kidney Disease. Most recent hemoglobin and hematocrit are listed below.  Recent Labs     05/16/25  0904 05/16/25  0912 05/17/25  0310   HGB 6.7*  --  7.3*   HCT 22.5* 23* 23.3*     Plan  - Monitor serial CBC: Daily  - Transfuse PRBC if patient becomes hemodynamically unstable, symptomatic or H/H drops below 7/21.  - Patient has received 1 units of PRBCs on 05/16  - Patient's anemia is currently stable  - no signs of bleeding, melena, bruising,   T2DM (type 2 diabetes mellitus)  Patient's FSGs are controlled on current medication regimen.  Last A1c reviewed-   Lab Results   Component Value Date    HGBA1C 5.7 (H)  05/07/2025     Most recent fingerstick glucose reviewed-   Recent Labs   Lab 05/16/25 2025 05/17/25  0623 05/17/25  1210 05/17/25  1614   POCTGLUCOSE 219* 120* 186* 216*     Current correctional scale  Low  Maintain anti-hyperglycemic dose as follows-   Antihyperglycemics (From admission, onward)      Start     Stop Route Frequency Ordered    05/16/25 1155  insulin aspart U-100 pen 0-5 Units         -- SubQ Before meals & nightly PRN 05/16/25 1056          Hold Oral hypoglycemics (glimepiride) while patient is in the hospital.  - diabetic diet   Essential hypertension  Patient's blood pressure range in the last 24 hours was: BP  Min: 123/65  Max: 177/80.The patient's inpatient anti-hypertensive regimen is listed below:  Current Antihypertensives  metoprolol succinate (TOPROL-XL) 24 hr split tablet 12.5 mg, Daily, Oral    Plan  - BP is controlled, no changes needed to their regimen  - hx of autonomic dysfunction, continue midodrine   HLD (hyperlipidemia)  - continue statin   BPH (benign prostatic hyperplasia)  - s/p TURP  - continue flomax   GERD (gastroesophageal reflux disease)  - continue ppi   History of ulcerative colitis  - s/p colectomy and ileostomy in 1980s.  - no changes in ostomy output recently   CAD (coronary artery disease)  Patient with known CAD s/p stent placement, which is controlled Will continue ASA and Statin and monitor for S/Sx of angina/ACS. Continue to monitor on telemetry.   Orthostatic hypotension  - hx of autonomic dysfuntion  - continue midodrine 10 mg TID   History of gout  - continue allopurinol  - recommend stopping colchicine 2/2 kidney function   Metabolic acidosis  - start sodium bicarb 650 mg BID   Prostate cancer  - s/p TURP  - receives leuprolide injections q6months   - follows closely with urology   Chronic combined systolic and diastolic heart failure  - last echo 02/2025 with EF 30-35%, indeterminate LV diastolic function   - he appears hypervolemic on exam with BLE edema  -  CXR concerning for small pleural effusion, but no rales on exam  - will obtain BNP  - continue BB  - Monitor strict Is&Os and daily weights.    - Place on fluid restriction of 1.5 L.   - monitor tele  Chronic deep vein thrombosis (DVT)  - continue eliquis   PAF (paroxysmal atrial fibrillation)  Patient has paroxysmal (<7 days) atrial fibrillation. Patient is currently in sinus rhythm. [id number] Score: 4. The patients heart rate in the last 24 hours is as follows:  Pulse  Min: 54  Max: 69     Antiarrhythmics  amiodarone tablet 200 mg, Daily, Oral  metoprolol succinate (TOPROL-XL) 24 hr split tablet 12.5 mg, Daily, Oral    Anticoagulants  apixaban tablet 5 mg, 2 times daily, Oral    Plan  - Replete lytes with a goal of K>4, Mg >2  - Patient is anticoagulated, see medications listed above.  - Patient's afib is currently controlled  Hypomagnesemia  Patient has Abnormal Magnesium: hypomagnesemia. Will continue to monitor electrolytes closely. Will replace the affected electrolytes and repeat labs to be done after interventions completed. The patient's magnesium results have been reviewed and are listed below.  Recent Labs   Lab 05/17/25  0310   MG 1.6      VTE Risk Mitigation (From admission, onward)           Ordered     apixaban tablet 5 mg  2 times daily         05/16/25 1341     IP VTE HIGH RISK PATIENT  Once         05/16/25 1056     Reason for No Pharmacological VTE Prophylaxis  Once        Question:  Reasons:  Answer:  Already adequately anticoagulated on oral Anticoagulants    05/16/25 1056                    Discharge Planning   JOSÉ MIGUEL:      Code Status: Full Code   Medical Readiness for Discharge Date:                            Diane Mcclelland MD  Department of Hospital Medicine   [facility] - Cardiology Stepdown

## 2025-05-17 NOTE — ASSESSMENT & PLAN NOTE
GRADY is likely due to unsure, will obtain renal studies. Possibly just worsening of his kidney function. Baseline creatinine is 4-5. Most recent creatinine and eGFR are listed below.  Recent Labs     05/16/25  0904 05/16/25  1600 05/17/25  0310   CREATININE 6.8* 5.7*  6.2* 6.6*   EGFRNORACEVR 8* 10*  9* 8*      Plan  - GRADY is worsening. Will adjust treatment as follows:    - Avoid nephrotoxins and renally dose meds for GFR listed above  - Monitor urine output, serial BMP, and adjust therapy as needed  - obtain renal studies  - Nephrology consulted; appreciate recs   - pending decision on HD initiation inpatient.

## 2025-05-17 NOTE — ASSESSMENT & PLAN NOTE
Patient's blood pressure range in the last 24 hours was: BP  Min: 123/65  Max: 177/80.The patient's inpatient anti-hypertensive regimen is listed below:  Current Antihypertensives  metoprolol succinate (TOPROL-XL) 24 hr split tablet 12.5 mg, Daily, Oral    Plan  - BP is controlled, no changes needed to their regimen  - hx of autonomic dysfunction, continue midodrine

## 2025-05-17 NOTE — ASSESSMENT & PLAN NOTE
Patient has paroxysmal (<7 days) atrial fibrillation. Patient is currently in sinus rhythm. [id number] Score: 4. The patients heart rate in the last 24 hours is as follows:  Pulse  Min: 54  Max: 69     Antiarrhythmics  amiodarone tablet 200 mg, Daily, Oral  metoprolol succinate (TOPROL-XL) 24 hr split tablet 12.5 mg, Daily, Oral    Anticoagulants  apixaban tablet 5 mg, 2 times daily, Oral    Plan  - Replete lytes with a goal of K>4, Mg >2  - Patient is anticoagulated, see medications listed above.  - Patient's afib is currently controlled

## 2025-05-17 NOTE — ASSESSMENT & PLAN NOTE
Anemia is likely due to chronic disease due to Chronic Kidney Disease. Most recent hemoglobin and hematocrit are listed below.  Recent Labs     05/16/25  0904 05/16/25  0912 05/17/25  0310   HGB 6.7*  --  7.3*   HCT 22.5* 23* 23.3*     Plan  - Monitor serial CBC: Daily  - Transfuse PRBC if patient becomes hemodynamically unstable, symptomatic or H/H drops below 7/21.  - Patient has received 1 units of PRBCs on 05/16  - Patient's anemia is currently stable  - no signs of bleeding, melena, bruising,

## 2025-05-18 VITALS
WEIGHT: 167 LBS | HEIGHT: 74 IN | BODY MASS INDEX: 21.43 KG/M2 | OXYGEN SATURATION: 96 % | DIASTOLIC BLOOD PRESSURE: 76 MMHG | TEMPERATURE: 97 F | RESPIRATION RATE: 18 BRPM | SYSTOLIC BLOOD PRESSURE: 173 MMHG | HEART RATE: 52 BPM

## 2025-05-18 LAB
ANION GAP (OHS): 11 MMOL/L (ref 8–16)
BUN SERPL-MCNC: 79 MG/DL (ref 8–23)
CALCIUM SERPL-MCNC: 7.9 MG/DL (ref 8.7–10.5)
CHLORIDE SERPL-SCNC: 109 MMOL/L (ref 95–110)
CO2 SERPL-SCNC: 18 MMOL/L (ref 23–29)
CREAT SERPL-MCNC: 6.3 MG/DL (ref 0.5–1.4)
ERYTHROCYTE [DISTWIDTH] IN BLOOD BY AUTOMATED COUNT: 18 % (ref 11.5–14.5)
GFR SERPLBLD CREATININE-BSD FMLA CKD-EPI: 9 ML/MIN/1.73/M2
GLUCOSE SERPL-MCNC: 152 MG/DL (ref 70–110)
HCT VFR BLD AUTO: 24.7 % (ref 40–54)
HGB BLD-MCNC: 7.8 GM/DL (ref 14–18)
MAGNESIUM SERPL-MCNC: 2 MG/DL (ref 1.6–2.6)
MCH RBC QN AUTO: 30.4 PG (ref 27–31)
MCHC RBC AUTO-ENTMCNC: 31.6 G/DL (ref 32–36)
MCV RBC AUTO: 96 FL (ref 82–98)
PHOSPHATE SERPL-MCNC: 6.9 MG/DL (ref 2.7–4.5)
PLATELET # BLD AUTO: 114 K/UL (ref 150–450)
PMV BLD AUTO: 9.8 FL (ref 9.2–12.9)
POCT GLUCOSE: 165 MG/DL (ref 70–110)
POCT GLUCOSE: 183 MG/DL (ref 70–110)
POCT GLUCOSE: 198 MG/DL (ref 70–110)
POTASSIUM SERPL-SCNC: 5 MMOL/L (ref 3.5–5.1)
RBC # BLD AUTO: 2.57 M/UL (ref 4.6–6.2)
SODIUM SERPL-SCNC: 138 MMOL/L (ref 136–145)
WBC # BLD AUTO: 6.27 K/UL (ref 3.9–12.7)

## 2025-05-18 PROCEDURE — 84100 ASSAY OF PHOSPHORUS: CPT | Mod: HCNC

## 2025-05-18 PROCEDURE — 85027 COMPLETE CBC AUTOMATED: CPT | Mod: HCNC

## 2025-05-18 PROCEDURE — 25000003 PHARM REV CODE 250: Mod: HCNC

## 2025-05-18 PROCEDURE — G0378 HOSPITAL OBSERVATION PER HR: HCPCS | Mod: HCNC

## 2025-05-18 PROCEDURE — 83735 ASSAY OF MAGNESIUM: CPT | Mod: HCNC

## 2025-05-18 PROCEDURE — 36415 COLL VENOUS BLD VENIPUNCTURE: CPT | Mod: HCNC

## 2025-05-18 PROCEDURE — 99214 OFFICE O/P EST MOD 30 MIN: CPT | Mod: HCNC,GC,, | Performed by: INTERNAL MEDICINE

## 2025-05-18 PROCEDURE — 96372 THER/PROPH/DIAG INJ SC/IM: CPT

## 2025-05-18 PROCEDURE — 80048 BASIC METABOLIC PNL TOTAL CA: CPT | Mod: HCNC

## 2025-05-18 PROCEDURE — 63600175 PHARM REV CODE 636 W HCPCS: Mod: JZ,EC,TB,HCNC

## 2025-05-18 RX ORDER — TORSEMIDE 20 MG/1
20 TABLET ORAL DAILY
Qty: 30 TABLET | Refills: 1 | Status: SHIPPED | OUTPATIENT
Start: 2025-05-18 | End: 2025-07-17

## 2025-05-18 RX ORDER — PREDNISONE 2.5 MG/1
TABLET ORAL
Qty: 14 TABLET | Refills: 0 | Status: SHIPPED | OUTPATIENT
Start: 2025-05-19 | End: 2025-05-25

## 2025-05-18 RX ORDER — TORSEMIDE 20 MG/1
20 TABLET ORAL DAILY
Status: DISCONTINUED | OUTPATIENT
Start: 2025-05-18 | End: 2025-05-18 | Stop reason: HOSPADM

## 2025-05-18 RX ORDER — LEVOTHYROXINE SODIUM 25 UG/1
25 TABLET ORAL
Qty: 30 TABLET | Refills: 0 | Status: SHIPPED | OUTPATIENT
Start: 2025-05-18 | End: 2026-05-18

## 2025-05-18 RX ADMIN — BRIMONIDINE TARTRATE 1 DROP: 2 SOLUTION OPHTHALMIC at 08:05

## 2025-05-18 RX ADMIN — TAMSULOSIN HYDROCHLORIDE 0.4 MG: 0.4 CAPSULE ORAL at 08:05

## 2025-05-18 RX ADMIN — ERYTHROPOIETIN 20000 UNITS: 10000 INJECTION, SOLUTION INTRAVENOUS; SUBCUTANEOUS at 03:05

## 2025-05-18 RX ADMIN — AMIODARONE HYDROCHLORIDE 200 MG: 200 TABLET ORAL at 08:05

## 2025-05-18 RX ADMIN — PRAVASTATIN SODIUM 40 MG: 40 TABLET ORAL at 08:05

## 2025-05-18 RX ADMIN — SODIUM BICARBONATE 650 MG: 650 TABLET ORAL at 08:05

## 2025-05-18 RX ADMIN — METOPROLOL SUCCINATE 12.5 MG: 25 TABLET, EXTENDED RELEASE ORAL at 08:05

## 2025-05-18 RX ADMIN — LEVOTHYROXINE SODIUM 25 MCG: 0.03 TABLET ORAL at 05:05

## 2025-05-18 RX ADMIN — SODIUM ZIRCONIUM CYCLOSILICATE 10 G: 10 POWDER, FOR SUSPENSION ORAL at 08:05

## 2025-05-18 RX ADMIN — APIXABAN 5 MG: 5 TABLET, FILM COATED ORAL at 08:05

## 2025-05-18 RX ADMIN — Medication 400 MG: at 08:05

## 2025-05-18 RX ADMIN — ALLOPURINOL 300 MG: 300 TABLET ORAL at 08:05

## 2025-05-18 RX ADMIN — FERROUS SULFATE TAB EC 325 MG (65 MG FE EQUIVALENT) 1 EACH: 325 (65 FE) TABLET DELAYED RESPONSE at 08:05

## 2025-05-18 RX ADMIN — PREDNISONE 20 MG: 20 TABLET ORAL at 08:05

## 2025-05-18 RX ADMIN — ASPIRIN 81 MG: 81 TABLET, COATED ORAL at 08:05

## 2025-05-18 RX ADMIN — GABAPENTIN 200 MG: 100 CAPSULE ORAL at 08:05

## 2025-05-18 NOTE — PROGRESS NOTES
[facility] - Cardiology Stepdown  Nephrology  Progress Note    Patient Name: Jarrett Charlton Jr.  MRN: [id number]  Admission Date: 5/16/2025  Hospital Length of Stay: 0 days  Attending Provider: Diane Mcclelland MD   Primary Care Physician: Olga Hampton MD  Principal Problem:Hyperkalemia    Subjective:     HPI: Jarrett is a pleasant 74 yo man w pmhx significant for T2DM, HTN, [place], HLD, CKD IV (follows w Dr Mukherjee), DVT, pAF (on eliquis), Prostate CA s/p TURP, UC s/p ileostomy, GERD, gout, HFrEF (EF 30%), autonomic dysfunction, BPH, anemia, CAD, and recent campylobacter infection c/b bacteremia, recently at PCP office w K 6.3, Hg 6.9, received transfusion in ED prbc, given iv calcium gluconate and shifted and admitted to the hospital for management of hyperK. Previously seen by Dr Mukherjee, he has CKD4 superimposed GRADY 2/2 age related nephron loss, recurrent GRADY, high ostomy output, HFrEF and diastolic dysfx 35% EF chronic hypotension leading to hypoperfusion, he had discussed dialysis modalities, not a good candidate for PD d/t ileostomy, given his hypotension would also be a challenging candidate for HD. He is on midodrine chronically. Nephrology consulted for management of GRADY and hyperK.    Interval History: Patient doing well, no sob, potassium controlled.     Review of patient's allergies indicates:   Allergen Reactions    Atorvastatin     Rosuvastatin      Current Facility-Administered Medications   Medication Frequency    0.9%  NaCl infusion (for blood administration) Q24H PRN    0.9%  NaCl infusion (for blood administration) Q24H PRN    acetaminophen tablet 1,000 mg Q8H PRN    acetaminophen tablet 650 mg Q4H PRN    albuterol-ipratropium 2.5 mg-0.5 mg/3 mL nebulizer solution 3 mL Q4H PRN    allopurinoL tablet 300 mg Daily    aluminum-magnesium hydroxide-simethicone 200-200-20 mg/5 mL suspension 30 mL QID PRN    amiodarone tablet 200 mg Daily    apixaban tablet 5 mg BID    aspirin EC tablet 81 mg Daily     brimonidine 0.2% ophthalmic solution 1 drop BID    calcium gluconate 1 g in NS IVPB (premixed) Q10 Min PRN    dextrose 50% injection 12.5 g PRN    dextrose 50% injection 25 g PRN    ferrous sulfate tablet 1 each Daily    gabapentin capsule 200 mg BID    glucagon (human recombinant) injection 1 mg PRN    glucose chewable tablet 16 g PRN    glucose chewable tablet 24 g PRN    insulin aspart U-100 pen 0-5 Units QID (AC + HS) PRN    levothyroxine tablet 25 mcg Before breakfast    LIDOcaine 5 % patch 1 patch Q24H    magnesium oxide tablet 400 mg Daily    melatonin tablet 6 mg Nightly PRN    methyl salicylate-menthol 15-10% cream TID    metoprolol succinate (TOPROL-XL) 24 hr split tablet 12.5 mg Daily    midodrine tablet 10 mg TID WM    naloxone 0.4 mg/mL injection 0.02 mg PRN    ondansetron disintegrating tablet 8 mg Q8H PRN    polyethylene glycol packet 17 g BID PRN    pravastatin tablet 40 mg Daily    [START ON 5/19/2025] predniSONE tablet 10 mg Daily    Followed by    [START ON 5/21/2025] predniSONE tablet 5 mg Daily    Followed by    [START ON 5/23/2025] predniSONE tablet 2.5 mg Daily    prochlorperazine injection Soln 5 mg Q6H PRN    simethicone chewable tablet 80 mg QID PRN    sodium bicarbonate tablet 650 mg BID    sodium chloride 0.9% flush 5 mL PRN    sodium zirconium cyclosilicate packet 10 g TID    tamsulosin 24 hr capsule 0.4 mg Daily    tramadol split tablet 25 mg Q12H PRN     Facility-Administered Medications Ordered in Other Encounters   Medication Frequency    sodium chloride 0.9% in 1,000 mL infusion Continuous       Objective:     Vital Signs (Most Recent):  Temp: 97.8 °F (36.6 °C) (05/18/25 0800)  Pulse: (!) 58 (05/18/25 0800)  Resp: 18 (05/18/25 0800)  BP: (!) 147/76 (05/18/25 0800)  SpO2: 96 % (05/18/25 0800) Vital Signs (24h Range):  Temp:  [97.3 °F (36.3 °C)-98 °F (36.7 °C)] 97.8 °F (36.6 °C)  Pulse:  [53-58] 58  Resp:  [18] 18  SpO2:  [96 %-98 %] 96 %  BP: (128-176)/(62-79) 147/76     Weight: 75.8  kg (167 lb) (05/16/25 0756)  Body mass index is 21.44 kg/m².  Body surface area is 1.99 meters squared.    I/O last 3 completed shifts:  In: 1062 [P.O.:1062]  Out: 2750 [Urine:2750]     Physical Exam  Constitutional:       Appearance: Normal appearance.   HENT:      Head: Normocephalic.   Cardiovascular:      Rate and Rhythm: Normal rate.      Heart sounds: Murmur heard.   Pulmonary:      Effort: Pulmonary effort is normal.      Breath sounds: Normal breath sounds.   Musculoskeletal:      Right lower leg: Edema present.      Left lower leg: Edema present.   Skin:     General: Skin is warm and dry.      Findings: Bruising present.   Neurological:      General: No focal deficit present.      Mental Status: He is alert and oriented to person, place, and time.   Psychiatric:         Mood and Affect: Mood normal.      Significant Labs:  All labs within the past 24 hours have been reviewed.     Significant Imaging:  Labs: Reviewed  Potassium wnl  Assessment/Plan:     Renal/  * Hyperkalemia  Patient's hyperkalemia resolved with diuretics and Lokelma. He was not on a low potassium diet at home and not on a diuretic.    - ok to discharge home with Torsemide 20mg q am, patient also instructed to drink more water  Instructed to follow low potassium diet  - lab work on 5/20/25, need close follow up    Might need Lokelma as outpatient but had problems with hypokalemia in the past    CKD (chronic kidney disease), stage IV  Rapidly advancing CKD5, multifactorial (heart failure), T2DM, HTN, vascular disease and plenty of episodes of intravascular depletion from his colostomy output.   Will need close outpatient follow up with nephrology  - discharge with vascular surgery appointment for AV graft placement  - not a good candidate for peritoneal dialysis     Oncology  Anemia  Will need epo and possibly iron as outpatient.        Thank you for your consult. I will follow-up with patient. Please contact us if you have any additional  questions.    Crystal Guillen MD  Nephrology  [facility] - Cardiology Stepdown

## 2025-05-18 NOTE — ASSESSMENT & PLAN NOTE
Patient's hyperkalemia resolved with diuretics and Lokelma. He was not on a low potassium diet at home and not on a diuretic.    - ok to discharge home with Torsemide 20mg q am, patient also instructed to drink more water  Instructed to follow low potassium diet  - lab work on 5/20/25, need close follow up    Might need Lokelma as outpatient but had problems with hypokalemia in the past

## 2025-05-18 NOTE — PLAN OF CARE
[facility] - Cardiology Stepdown  Discharge Final Note    Primary Care Provider: Olga Hampton MD    Expected Discharge Date: 5/18/2025    Final Discharge Note (most recent)       Final Note - 05/18/25 1615          Final Note    Assessment Type Final Discharge Note (P)      Anticipated Discharge Disposition Home or Self Care (P)      What phone number can be called within the next 1-3 days to see how you are doing after discharge? [phone] (P)      Hospital Resources/Appts/Education Provided Provided patient/caregiver with written discharge plan information;Provided education on problems/symptoms using teachback;Appointments scheduled and added to AVS (P)         Post-Acute Status    Post-Acute Authorization Other (P)      Other Status Awaiting f/u Appts (P)    Vascular Surgery Appt.    Discharge Delays None known at this time (P)                      Important Message from Medicare           Pt. discharged to home with Self-Care. Pt awaiting scheduling of Vascular Access appt. SW arranging pt transportation home via [employer].     Kassie Galdamez LMSW

## 2025-05-18 NOTE — PLAN OF CARE
Plan of care discussed with patient. Patient is free of fall/trauma/injury. Denies CP, SOB, or pain/discomfort. All questions addressed. Will continue to monitor.      Problem: Adult Inpatient Plan of Care  Goal: Plan of Care Review  Outcome: Progressing     Problem: Adult Inpatient Plan of Care  Goal: Patient-Specific Goal (Individualized)  Outcome: Progressing     Problem: Adult Inpatient Plan of Care  Goal: Absence of Hospital-Acquired Illness or Injury  Outcome: Progressing     Problem: Adult Inpatient Plan of Care  Goal: Optimal Comfort and Wellbeing  Outcome: Progressing     Problem: Adult Inpatient Plan of Care  Goal: Readiness for Transition of Care  Outcome: Progressing

## 2025-05-18 NOTE — ASSESSMENT & PLAN NOTE
Rapidly advancing CKD5, multifactorial (heart failure), T2DM, HTN, vascular disease and plenty of episodes of intravascular depletion from his colostomy output.   Will need close outpatient follow up with nephrology  - discharge with vascular surgery appointment for AV graft placement  - not a good candidate for peritoneal dialysis

## 2025-05-18 NOTE — SUBJECTIVE & OBJECTIVE
Interval History: Patient doing well, no sob, potassium controlled.     Review of patient's allergies indicates:   Allergen Reactions    Atorvastatin     Rosuvastatin      Current Facility-Administered Medications   Medication Frequency    0.9%  NaCl infusion (for blood administration) Q24H PRN    0.9%  NaCl infusion (for blood administration) Q24H PRN    acetaminophen tablet 1,000 mg Q8H PRN    acetaminophen tablet 650 mg Q4H PRN    albuterol-ipratropium 2.5 mg-0.5 mg/3 mL nebulizer solution 3 mL Q4H PRN    allopurinoL tablet 300 mg Daily    aluminum-magnesium hydroxide-simethicone 200-200-20 mg/5 mL suspension 30 mL QID PRN    amiodarone tablet 200 mg Daily    apixaban tablet 5 mg BID    aspirin EC tablet 81 mg Daily    brimonidine 0.2% ophthalmic solution 1 drop BID    calcium gluconate 1 g in NS IVPB (premixed) Q10 Min PRN    dextrose 50% injection 12.5 g PRN    dextrose 50% injection 25 g PRN    ferrous sulfate tablet 1 each Daily    gabapentin capsule 200 mg BID    glucagon (human recombinant) injection 1 mg PRN    glucose chewable tablet 16 g PRN    glucose chewable tablet 24 g PRN    insulin aspart U-100 pen 0-5 Units QID (AC + HS) PRN    levothyroxine tablet 25 mcg Before breakfast    LIDOcaine 5 % patch 1 patch Q24H    magnesium oxide tablet 400 mg Daily    melatonin tablet 6 mg Nightly PRN    methyl salicylate-menthol 15-10% cream TID    metoprolol succinate (TOPROL-XL) 24 hr split tablet 12.5 mg Daily    midodrine tablet 10 mg TID WM    naloxone 0.4 mg/mL injection 0.02 mg PRN    ondansetron disintegrating tablet 8 mg Q8H PRN    polyethylene glycol packet 17 g BID PRN    pravastatin tablet 40 mg Daily    [START ON 5/19/2025] predniSONE tablet 10 mg Daily    Followed by    [START ON 5/21/2025] predniSONE tablet 5 mg Daily    Followed by    [START ON 5/23/2025] predniSONE tablet 2.5 mg Daily    prochlorperazine injection Soln 5 mg Q6H PRN    simethicone chewable tablet 80 mg QID PRN    sodium bicarbonate  tablet 650 mg BID    sodium chloride 0.9% flush 5 mL PRN    sodium zirconium cyclosilicate packet 10 g TID    tamsulosin 24 hr capsule 0.4 mg Daily    tramadol split tablet 25 mg Q12H PRN     Facility-Administered Medications Ordered in Other Encounters   Medication Frequency    sodium chloride 0.9% in 1,000 mL infusion Continuous       Objective:     Vital Signs (Most Recent):  Temp: 97.8 °F (36.6 °C) (05/18/25 0800)  Pulse: (!) 58 (05/18/25 0800)  Resp: 18 (05/18/25 0800)  BP: (!) 147/76 (05/18/25 0800)  SpO2: 96 % (05/18/25 0800) Vital Signs (24h Range):  Temp:  [97.3 °F (36.3 °C)-98 °F (36.7 °C)] 97.8 °F (36.6 °C)  Pulse:  [53-58] 58  Resp:  [18] 18  SpO2:  [96 %-98 %] 96 %  BP: (128-176)/(62-79) 147/76     Weight: 75.8 kg (167 lb) (05/16/25 0756)  Body mass index is 21.44 kg/m².  Body surface area is 1.99 meters squared.    I/O last 3 completed shifts:  In: 1062 [P.O.:1062]  Out: 2750 [Urine:2750]     Physical Exam  Constitutional:       Appearance: Normal appearance.   HENT:      Head: Normocephalic.   Cardiovascular:      Rate and Rhythm: Normal rate.      Heart sounds: Murmur heard.   Pulmonary:      Effort: Pulmonary effort is normal.      Breath sounds: Normal breath sounds.   Musculoskeletal:      Right lower leg: Edema present.      Left lower leg: Edema present.   Skin:     General: Skin is warm and dry.      Findings: Bruising present.   Neurological:      General: No focal deficit present.      Mental Status: He is alert and oriented to person, place, and time.   Psychiatric:         Mood and Affect: Mood normal.      Significant Labs:  All labs within the past 24 hours have been reviewed.     Significant Imaging:  Labs: Reviewed  Potassium wnl

## 2025-05-20 DIAGNOSIS — N18.4 STAGE 4 CHRONIC KIDNEY DISEASE: Primary | ICD-10-CM

## 2025-05-22 ENCOUNTER — OFFICE VISIT (OUTPATIENT)
Facility: CLINIC | Age: 75
End: 2025-05-22
Payer: MEDICARE

## 2025-05-22 ENCOUNTER — LAB VISIT (OUTPATIENT)
Dept: LAB | Facility: HOSPITAL | Age: 75
End: 2025-05-22
Attending: PHYSICIAN ASSISTANT
Payer: MEDICARE

## 2025-05-22 ENCOUNTER — EPISODE CHANGES (OUTPATIENT)
Dept: CARDIOLOGY | Facility: CLINIC | Age: 75
End: 2025-05-22

## 2025-05-22 ENCOUNTER — OFFICE VISIT (OUTPATIENT)
Dept: HEMATOLOGY/ONCOLOGY | Facility: CLINIC | Age: 75
End: 2025-05-22
Payer: MEDICARE

## 2025-05-22 VITALS
OXYGEN SATURATION: 96 % | HEART RATE: 52 BPM | HEIGHT: 74 IN | SYSTOLIC BLOOD PRESSURE: 96 MMHG | RESPIRATION RATE: 15 BRPM | BODY MASS INDEX: 22.33 KG/M2 | WEIGHT: 174 LBS | DIASTOLIC BLOOD PRESSURE: 59 MMHG

## 2025-05-22 VITALS — BODY MASS INDEX: 22.38 KG/M2 | WEIGHT: 174.38 LBS | HEIGHT: 74 IN

## 2025-05-22 DIAGNOSIS — R55 SYNCOPE AND COLLAPSE: ICD-10-CM

## 2025-05-22 DIAGNOSIS — N18.5 STAGE 5 CHRONIC KIDNEY DISEASE NOT ON CHRONIC DIALYSIS: ICD-10-CM

## 2025-05-22 DIAGNOSIS — D50.8 OTHER IRON DEFICIENCY ANEMIA: ICD-10-CM

## 2025-05-22 DIAGNOSIS — R29.818 OTHER SYMPTOMS AND SIGNS INVOLVING THE NERVOUS SYSTEM: ICD-10-CM

## 2025-05-22 DIAGNOSIS — I10 ESSENTIAL HYPERTENSION: ICD-10-CM

## 2025-05-22 DIAGNOSIS — G45.9 TRANSIENT CEREBRAL ISCHEMIA, UNSPECIFIED TYPE: ICD-10-CM

## 2025-05-22 DIAGNOSIS — N18.5 STAGE 5 CHRONIC KIDNEY DISEASE NOT ON CHRONIC DIALYSIS: Primary | ICD-10-CM

## 2025-05-22 DIAGNOSIS — R26.9 ABNORMAL GAIT: ICD-10-CM

## 2025-05-22 DIAGNOSIS — R42 DIZZINESS AND GIDDINESS: Primary | ICD-10-CM

## 2025-05-22 DIAGNOSIS — Z86.718 HISTORY OF DEEP VENOUS THROMBOSIS (DVT) OF DISTAL VEIN OF LEFT LOWER EXTREMITY: ICD-10-CM

## 2025-05-22 LAB
ABSOLUTE EOSINOPHIL (OHS): 0.18 K/UL
ABSOLUTE MONOCYTE (OHS): 0.32 K/UL (ref 0.3–1)
ABSOLUTE NEUTROPHIL COUNT (OHS): 3.66 K/UL (ref 1.8–7.7)
BASOPHILS # BLD AUTO: 0.01 K/UL
BASOPHILS NFR BLD AUTO: 0.2 %
ERYTHROCYTE [DISTWIDTH] IN BLOOD BY AUTOMATED COUNT: 18.8 % (ref 11.5–14.5)
FERRITIN SERPL-MCNC: 666.1 NG/ML (ref 20–300)
HCT VFR BLD AUTO: 27.2 % (ref 40–54)
HGB BLD-MCNC: 8.3 GM/DL (ref 14–18)
IMM GRANULOCYTES # BLD AUTO: 0.02 K/UL (ref 0–0.04)
IMM GRANULOCYTES NFR BLD AUTO: 0.4 % (ref 0–0.5)
IRON SATN MFR SERPL: 17 % (ref 20–50)
IRON SERPL-MCNC: 55 UG/DL (ref 45–160)
LYMPHOCYTES # BLD AUTO: 1.07 K/UL (ref 1–4.8)
MCH RBC QN AUTO: 30.4 PG (ref 27–31)
MCHC RBC AUTO-ENTMCNC: 30.5 G/DL (ref 32–36)
MCV RBC AUTO: 100 FL (ref 82–98)
NUCLEATED RBC (/100WBC) (OHS): 0 /100 WBC
PLATELET # BLD AUTO: 130 K/UL (ref 150–450)
PMV BLD AUTO: 9.8 FL (ref 9.2–12.9)
RBC # BLD AUTO: 2.73 M/UL (ref 4.6–6.2)
RELATIVE EOSINOPHIL (OHS): 3.4 %
RELATIVE LYMPHOCYTE (OHS): 20.3 % (ref 18–48)
RELATIVE MONOCYTE (OHS): 6.1 % (ref 4–15)
RELATIVE NEUTROPHIL (OHS): 69.6 % (ref 38–73)
TIBC SERPL-MCNC: 315 UG/DL (ref 250–450)
TRANSFERRIN SERPL-MCNC: 213 MG/DL (ref 200–375)
WBC # BLD AUTO: 5.26 K/UL (ref 3.9–12.7)

## 2025-05-22 PROCEDURE — 99999 PR PBB SHADOW E&M-EST. PATIENT-LVL IV: CPT | Mod: PBBFAC,HCNC,, | Performed by: NEUROLOGICAL SURGERY

## 2025-05-22 PROCEDURE — 85025 COMPLETE CBC W/AUTO DIFF WBC: CPT | Mod: HCNC

## 2025-05-22 PROCEDURE — 36415 COLL VENOUS BLD VENIPUNCTURE: CPT | Mod: HCNC

## 2025-05-22 PROCEDURE — 82728 ASSAY OF FERRITIN: CPT | Mod: HCNC

## 2025-05-22 PROCEDURE — 99999 PR PBB SHADOW E&M-EST. PATIENT-LVL V: CPT | Mod: PBBFAC,,, | Performed by: PHYSICIAN ASSISTANT

## 2025-05-22 PROCEDURE — 82668 ASSAY OF ERYTHROPOIETIN: CPT | Mod: HCNC

## 2025-05-22 PROCEDURE — 84466 ASSAY OF TRANSFERRIN: CPT | Mod: HCNC

## 2025-05-22 NOTE — PROGRESS NOTES
Hematology/Oncology Clinic New Patient Note    Patient ID: Jarrett Charlton Jr. is a 75 y.o. male.    Chief Complaint: Anemia    History     Mr. Charlton is a 75 y.o. male referred to hematology for the evaluation and management of anemia. Co morbidities include h/o LLE DVT (8/2024), T2DM, HTN, HLD, CKD4, pAF, h/o prostate cancer s/p TURP, UC s/p ileostomy, GERD, gout, autonomic dysfunction, BPH, and CAD.     Patient was seen in ED recently for abnormal labs (K+ done by PCP 6.3 and Hgb 6.9 g/dL). He was given 1 unit PRBCs. CMP significant for BUN/Cr 82/6.8. He was admitted to hospital medicine for further management. Prior to this was seen inpatient for Campylobacter infection. EGD done recently in April without any significant bleeding     S/p IV iron x 10 weeks, last 4/17/2025   Scheduled to get Fistula in July in case dialysis is needed   Received Epo injections monthly since April   Did not find any difference with IV iron infusions     ROS as noted below     Review of patient's allergies indicates:   Allergen Reactions    Atorvastatin     Rosuvastatin          Past medical, surgical, and medication history, past family history reviewed and updated with patient today as noted.      Past Medical History:   Diagnosis Date    Autonomic dysfunction 11/2023 11/2023 neuro note    Basal cell carcinoma     BPH (benign prostatic hyperplasia)     s/p TURP    CAD (coronary artery disease) 2017    status post PCI to mid LAD in 2017 (LHC in 2021 showed patent stent, otherwise nonobstructive CAD)    Cancer of prostate     s/p TURP    Chronic kidney disease     DDD (degenerative disc disease) 10/21/2013    Diabetes mellitus with renal complications     Disc disease, degenerative, cervical     DVT (deep venous thrombosis)     Encounter for blood transfusion     GERD (gastroesophageal reflux disease)     History of ulcerative colitis 1982    s/p colectomy and ileostomy    HLD (hyperlipidemia)     Ileostomy in place 1982     RBBB     Squamous cell carcinoma 03/08/2018    Left superior helix near insertion    Squamous cell carcinoma 04/12/2018    Left forearm x 5    Ventricular tachycardia        Past Surgical History:   Procedure Laterality Date    ABLATION, SVT, ACCESSORY PATHWAY N/A 04/30/2024    Procedure: Ablation, SVT, Accessory Pathway;  Surgeon: Franky Taylor MD;  Location: [facility] EP LAB;  Service: Cardiology;  Laterality: N/A;  VT, RFA, Carto, MAC, GP, 3 Prep *MDT ILR in situ*    cardiac stents      CATARACT EXTRACTION Bilateral     CERVICAL FUSION      CHOLECYSTECTOMY N/A 02/14/2023    Procedure: CHOLECYSTECTOMY;  Surgeon: Mike Joyce MD;  Location: [facility] OR;  Service: General;  Laterality: N/A;  very high probabilty of converison to open    colectomy and ileostomy  1985    EGD, WITH CLOSED BIOPSY  04/04/2025    ENDOSCOPIC ULTRASOUND OF UPPER GASTROINTESTINAL TRACT N/A 02/10/2023    Procedure: ULTRASOUND, UPPER GI TRACT, ENDOSCOPIC;  Surgeon: Poli Fuller MD;  Location: [facility] ENDO;  Service: Endoscopy;  Laterality: N/A;    ERCP N/A 02/10/2023    Procedure: ERCP (ENDOSCOPIC RETROGRADE CHOLANGIOPANCREATOGRAPHY);  Surgeon: Poli Fuller MD;  Location: [facility] ENDO;  Service: Endoscopy;  Laterality: N/A;    ESOPHAGOGASTRODUODENOSCOPY N/A 4/4/2025    Procedure: EGD (ESOPHAGOGASTRODUODENOSCOPY);  Surgeon: Melania Gibson MD;  Location: [facility] ENDO;  Service: Endoscopy;  Laterality: N/A;    EXCISION OF PAROTID GLAND Left 12/18/2020    Procedure: EXCISION, PAROTID GLAND;  Surgeon: Michael Pinzon MD;  Location: [facility] 2ND FLR;  Service: ENT;  Laterality: Left;    INSERTION OF IMPLANTABLE LOOP RECORDER  06/07/2021    INSERTION OF IMPLANTABLE LOOP RECORDER Left 06/07/2021    Procedure: INSERTION, IMPLANTABLE LOOP RECORDER;  Surgeon: Romario Dao MD;  Location: [facility] CATH LAB;  Service: Cardiology;  Laterality: Left;    LEFT HEART CATHETERIZATION Right 04/15/2021    Procedure: CATHETERIZATION, HEART, LEFT;  Surgeon: Marcio  "MD Robert;  Location: [facility] CATH LAB;  Service: Cardiology;  Laterality: Right;    LYSIS OF ADHESIONS N/A 11/09/2020    Procedure: LYSIS, ADHESIONS,  ERAS low;  Surgeon: CED Haley MD;  Location: [facility] OR 2ND FLR;  Service: Colon and Rectal;  Laterality: N/A;    LYSIS OF ADHESIONS N/A 02/14/2023    Procedure: LYSIS, ADHESIONS;  Surgeon: Mike Joyce MD;  Location: [facility] OR;  Service: General;  Laterality: N/A;    POUCHOSCOPY N/A 04/06/2022    Procedure: ENDOSCOPY, POUCH, SMALL INTESTINE, DIAGNOSTIC;  Surgeon: Dieter Juarez MD;  Location: [facility] ENDO (2ND FLR);  Service: Endoscopy;  Laterality: N/A;    REPAIR, HERNIA, PARASTOMAL N/A 11/09/2020    Procedure: REPAIR, HERNIA, PARASTOMAL;  Surgeon: CED Haley MD;  Location: [facility] OR 2ND FLR;  Service: Colon and Rectal;  Laterality: N/A;    TRANSURETHRAL RESECTION OF PROSTATE (TURP) WITHOUT USE OF LASER N/A 01/23/2019    Procedure: TURP, WITHOUT USING LASER BIPOLAR;  Surgeon: Catarino Mota MD;  Location: [facility] OR 1ST FLR;  Service: Urology;  Laterality: N/A;  1.5 HOURS    TREATMENT OF CARDIAC ARRHYTHMIA  04/30/2024    Procedure: Cardioversion or Defibrillation;  Surgeon: Franky Taylor MD;  Location: [facility] EP LAB;  Service: Cardiology;;       Review of Systems:  Review of Systems   Constitutional:  Positive for malaise/fatigue and weight loss (Lost ~50-60lbs a few months ago (was not eating), slowly gaining it back).   HENT:  Negative for nosebleeds.    Respiratory:  Negative for shortness of breath.    Cardiovascular:  Negative for chest pain.   Gastrointestinal:  Negative for blood in stool and vomiting.   Genitourinary:  Negative for hematuria.   Musculoskeletal:  Negative for myalgias.   Neurological:  Positive for dizziness.        Feels off balance   Endo/Heme/Allergies:  Bruises/bleeds easily.      Physical Exam   Vitals:  BP (!) 96/59 (BP Location: Left arm, Patient Position: Sitting)   Pulse (!) 52   Resp 15   Ht 6' 2" (1.88 m)   " Wt 78.9 kg (174 lb)   SpO2 96%   BMI 22.34 kg/m²     Labs:  No visits with results within 2 Day(s) from this visit.   Latest known visit with results is:   Admission on 05/16/2025, Discharged on 05/18/2025   Component Date Value Ref Range Status    QRS Duration 05/16/2025 124  ms Final    [facility] QTC Calculation 05/16/2025 487  ms Final    Sodium 05/16/2025 143  136 - 145 mmol/L Final    Potassium 05/16/2025 6.1 (H)  3.5 - 5.1 mmol/L Final    *No Visible Hemolysis    Chloride 05/16/2025 115 (H)  95 - 110 mmol/L Final    CO2 05/16/2025 17 (L)  23 - 29 mmol/L Final    Glucose 05/16/2025 97  70 - 110 mg/dL Final    BUN 05/16/2025 82 (H)  8 - 23 mg/dL Final    Creatinine 05/16/2025 6.8 (H)  0.5 - 1.4 mg/dL Final    Calcium 05/16/2025 7.8 (L)  8.7 - 10.5 mg/dL Final    Protein Total 05/16/2025 5.4 (L)  6.0 - 8.4 gm/dL Final    Albumin 05/16/2025 2.5 (L)  3.5 - 5.2 g/dL Final    Bilirubin Total 05/16/2025 0.4  0.1 - 1.0 mg/dL Final    For infants and newborns, interpretation of results should be based   on gestational age, weight and in agreement with clinical   observations.    Premature Infant recommended reference ranges:   0-24 hours:  <8.0 mg/dL   24-48 hours: <12.0 mg/dL   3-5 days:    <15.0 mg/dL   6-29 days:   <15.0 mg/dL    ALP 05/16/2025 102  40 - 150 unit/L Final    AST 05/16/2025 21  11 - 45 unit/L Final    ALT 05/16/2025 19  10 - 44 unit/L Final    Anion Gap 05/16/2025 11  8 - 16 mmol/L Final    eGFR 05/16/2025 8 (L)  >60 mL/min/1.73/m2 Final    Estimated GFR calculated using the CKD-EPI creatinine (2021) equation.    WBC 05/16/2025 5.66  3.90 - 12.70 K/uL Final    RBC 05/16/2025 2.26 (L)  4.60 - 6.20 M/uL Final    HGB 05/16/2025 6.7 (L)  14.0 - 18.0 gm/dL Final    HCT 05/16/2025 22.5 (L)  40.0 - 54.0 % Final    MCV 05/16/2025 100 (H)  82 - 98 fL Final    MCH 05/16/2025 29.6  27.0 - 31.0 pg Final    MCHC 05/16/2025 29.8 (L)  32.0 - 36.0 g/dL Final    RDW 05/16/2025 20.0 (H)  11.5 - 14.5 % Final    Platelet  Count 05/16/2025 123 (L)  150 - 450 K/uL Final    MPV 05/16/2025 9.5  9.2 - 12.9 fL Final    Nucleated RBC 05/16/2025 0  <=0 /100 WBC Final    Neut % 05/16/2025 70.7  38 - 73 % Final    Lymph % 05/16/2025 19.3  18 - 48 % Final    Mono % 05/16/2025 6.4  4 - 15 % Final    Eos % 05/16/2025 2.7  <=8 % Final    Basophil % 05/16/2025 0.4  <=1.9 % Final    Imm Grans % 05/16/2025 0.5  0.0 - 0.5 % Final    Neut # 05/16/2025 4.01  1.8 - 7.7 K/uL Final    Lymph # 05/16/2025 1.09  1 - 4.8 K/uL Final    Mono # 05/16/2025 0.36  0.3 - 1 K/uL Final    Eos # 05/16/2025 0.15  <=0.5 K/uL Final    Baso # 05/16/2025 0.02  <=0.2 K/uL Final    Imm Grans # 05/16/2025 0.03  0.00 - 0.04 K/uL Final    Mild elevation in immature granulocytes is non specific and can be seen in a variety of conditions including stress response, acute inflammation, trauma and pregnancy. Correlation with other laboratory and clinical findings is essential.    Color, UA 05/16/2025 Colorless (A)  Straw, Stacy, Yellow, Light-Orange Final    Appearance, UA 05/16/2025 Clear  Clear Final    pH, UA 05/16/2025 6.0  5.0 - 8.0 Final    Spec Grav UA 05/16/2025 1.010  1.005 - 1.030 Final    Protein, UA 05/16/2025 Negative  Negative Final    Recommend a 24 hour urine protein or a urine protein/creatinine ratio if globulin induced proteinuria is clinically suspected.    Glucose, UA 05/16/2025 Negative  Negative Final    Ketones, UA 05/16/2025 Negative  Negative Final    Bilirubin, UA 05/16/2025 Negative  Negative Final    Blood, UA 05/16/2025 Negative  Negative Final    Nitrites, UA 05/16/2025 Negative  Negative Final    Urobilinogen, UA 05/16/2025 Negative  <2.0 EU/dL Final    Leukocyte Esterase, UA 05/16/2025 Negative  Negative Final    Magnesium  05/16/2025 1.4 (L)  1.6 - 2.6 mg/dL Final    POC Glucose 05/16/2025 96  70 - 110 mg/dL Final    POC BUN 05/16/2025 78 (H)  6 - 30 mg/dL Final    POC Creatinine 05/16/2025 7.7 (H)  0.5 - 1.4 mg/dL Final    POC Sodium 05/16/2025 142   136 - 145 mmol/L Final    POC Potassium 05/16/2025 6.0 (H)  3.5 - 5.1 mmol/L Final    POC Chloride 05/16/2025 112 (H)  95 - 110 mmol/L Final    POC TCO2 (MEASURED) 05/16/2025 17 (L)  23 - 29 mmol/L Final    POC Ionized Calcium 05/16/2025 1.10  1.06 - 1.42 mmol/L Final    POC Hematocrit 05/16/2025 23 (L)  36 - 54 %PCV Final    Sample 05/16/2025 SADAF   Final    Phosphorus Level 05/16/2025 6.1 (H)  2.7 - 4.5 mg/dL Final    Magnesium  05/16/2025 1.4 (L)  1.6 - 2.6 mg/dL Final    Specimen Outdate 05/16/2025 05/19/2025 23:59   Final    Group & Rh 05/16/2025 A POS   Final    Indirect Servando 05/16/2025 NEG   Final    UNIT NUMBER 05/16/2025 [id number]   Final    UNIT ABO/RH 05/16/2025 A POS   Final    DISPENSE STATUS 05/16/2025 Transfused   Final    Unit Expiration 05/16/2025 [id number]   Final    Product Code 05/16/2025 [id number]   Final    Unit Blood Type Code 05/16/2025 6200   Final    CROSSMATCH INTERPRETATION 05/16/2025 Compatible   Final    Extra Tube 05/16/2025 Hold for add-ons.   Final    Auto resulted.       Sodium 05/16/2025 145  136 - 145 mmol/L Final    Potassium 05/16/2025 4.5  3.5 - 5.1 mmol/L Final    Chloride 05/16/2025 119 (H)  95 - 110 mmol/L Final    CO2 05/16/2025 16 (L)  23 - 29 mmol/L Final    Glucose 05/16/2025 63 (L)  70 - 110 mg/dL Final    BUN 05/16/2025 73 (H)  8 - 23 mg/dL Final    Creatinine 05/16/2025 5.7 (H)  0.5 - 1.4 mg/dL Final    Calcium 05/16/2025 7.1 (L)  8.7 - 10.5 mg/dL Final    Anion Gap 05/16/2025 10  8 - 16 mmol/L Final    eGFR 05/16/2025 10 (L)  >60 mL/min/1.73/m2 Final    Estimated GFR calculated using the CKD-EPI creatinine (2021) equation.    POCT Glucose 05/16/2025 222 (H)  70 - 110 mg/dL Final    Sodium 05/16/2025 144  136 - 145 mmol/L Final    Potassium 05/16/2025 5.0  3.5 - 5.1 mmol/L Final    Chloride 05/16/2025 115 (H)  95 - 110 mmol/L Final    CO2 05/16/2025 18 (L)  23 - 29 mmol/L Final    Glucose 05/16/2025 71  70 - 110 mg/dL Final    BUN 05/16/2025 83 (H)  8 - 23  mg/dL Final    Creatinine 05/16/2025 6.2 (H)  0.5 - 1.4 mg/dL Final    Calcium 05/16/2025 8.0 (L)  8.7 - 10.5 mg/dL Final    Anion Gap 05/16/2025 11  8 - 16 mmol/L Final    eGFR 05/16/2025 9 (L)  >60 mL/min/1.73/m2 Final    Estimated GFR calculated using the CKD-EPI creatinine (2021) equation.    Urine Urea Nitrogen 05/16/2025 327  140 - 1,050 mg/dL Final    Urine Osmolality 05/16/2025 347  50 - 1,200 mOsm/kg Final    The random urine reference ranges provided were established for 24 hour urine collections.  No reference ranges exist for random urine specimens.  Correlate clinically.    Urine Creatinine 05/16/2025 41.0  23.0 - 375.0 mg/dL Final    Urine Sodium 05/16/2025 51  20 - 250 mmol/L Final    PTH Intact 05/16/2025 213.4 (H)  9.0 - 77.0 pg/mL Final    POCT Glucose 05/16/2025 79  70 - 110 mg/dL Final    POCT Glucose 05/16/2025 219 (H)  70 - 110 mg/dL Final    Sodium 05/17/2025 144  136 - 145 mmol/L Final    Potassium 05/17/2025 5.0  3.5 - 5.1 mmol/L Final    Chloride 05/17/2025 114 (H)  95 - 110 mmol/L Final    CO2 05/17/2025 18 (L)  23 - 29 mmol/L Final    Glucose 05/17/2025 103  70 - 110 mg/dL Final    BUN 05/17/2025 80 (H)  8 - 23 mg/dL Final    Creatinine 05/17/2025 6.6 (H)  0.5 - 1.4 mg/dL Final    Calcium 05/17/2025 7.8 (L)  8.7 - 10.5 mg/dL Final    Anion Gap 05/17/2025 12  8 - 16 mmol/L Final    eGFR 05/17/2025 8 (L)  >60 mL/min/1.73/m2 Final    Estimated GFR calculated using the CKD-EPI creatinine (2021) equation.    Magnesium  05/17/2025 1.6  1.6 - 2.6 mg/dL Final    Phosphorus Level 05/17/2025 7.2 (H)  2.7 - 4.5 mg/dL Final    WBC 05/17/2025 5.23  3.90 - 12.70 K/uL Final    RBC 05/17/2025 2.40 (L)  4.60 - 6.20 M/uL Final    HGB 05/17/2025 7.3 (L)  14.0 - 18.0 gm/dL Final    HCT 05/17/2025 23.3 (L)  40.0 - 54.0 % Final    MCV 05/17/2025 97  82 - 98 fL Final    MCHC 05/17/2025 31.3 (L)  32.0 - 36.0 g/dL Final    RDW 05/17/2025 19.2 (H)  11.5 - 14.5 % Final    Platelet Count 05/17/2025 112 (L)  150 -  450 K/uL Final    MCH 05/17/2025 30.4  27.0 - 31.0 pg Final    MPV 05/17/2025 9.9  9.2 - 12.9 fL Final    BNP 05/17/2025 1,302 (H)  0 - 99 pg/mL Final    Values of less than 100 pg/ml are consistent with non-CHF populations.     POCT Glucose 05/17/2025 120 (H)  70 - 110 mg/dL Final    POCT Glucose 05/17/2025 186 (H)  70 - 110 mg/dL Final    POCT Glucose 05/17/2025 216 (H)  70 - 110 mg/dL Final    POCT Glucose 05/17/2025 216 (H)  70 - 110 mg/dL Final    Sodium 05/18/2025 138  136 - 145 mmol/L Final    Potassium 05/18/2025 5.0  3.5 - 5.1 mmol/L Final    Chloride 05/18/2025 109  95 - 110 mmol/L Final    CO2 05/18/2025 18 (L)  23 - 29 mmol/L Final    Glucose 05/18/2025 152 (H)  70 - 110 mg/dL Final    BUN 05/18/2025 79 (H)  8 - 23 mg/dL Final    Creatinine 05/18/2025 6.3 (H)  0.5 - 1.4 mg/dL Final    Calcium 05/18/2025 7.9 (L)  8.7 - 10.5 mg/dL Final    Anion Gap 05/18/2025 11  8 - 16 mmol/L Final    eGFR 05/18/2025 9 (L)  >60 mL/min/1.73/m2 Final    Estimated GFR calculated using the CKD-EPI creatinine (2021) equation.    Magnesium  05/18/2025 2.0  1.6 - 2.6 mg/dL Final    Phosphorus Level 05/18/2025 6.9 (H)  2.7 - 4.5 mg/dL Final    WBC 05/18/2025 6.27  3.90 - 12.70 K/uL Final    RBC 05/18/2025 2.57 (L)  4.60 - 6.20 M/uL Final    HGB 05/18/2025 7.8 (L)  14.0 - 18.0 gm/dL Final    HCT 05/18/2025 24.7 (L)  40.0 - 54.0 % Final    MCV 05/18/2025 96  82 - 98 fL Final    MCHC 05/18/2025 31.6 (L)  32.0 - 36.0 g/dL Final    RDW 05/18/2025 18.0 (H)  11.5 - 14.5 % Final    Platelet Count 05/18/2025 114 (L)  150 - 450 K/uL Final    MCH 05/18/2025 30.4  27.0 - 31.0 pg Final    MPV 05/18/2025 9.8  9.2 - 12.9 fL Final    POCT Glucose 05/18/2025 165 (H)  70 - 110 mg/dL Final    POCT Glucose 05/18/2025 183 (H)  70 - 110 mg/dL Final    POCT Glucose 05/18/2025 198 (H)  70 - 110 mg/dL Final        Physical Exam:  Physical Exam  Constitutional:       General: He is not in acute distress.  Eyes:      General: No scleral  icterus.  Pulmonary:      Effort: Pulmonary effort is normal. No respiratory distress.   Skin:     Coloration: Skin is not jaundiced.   Neurological:      Mental Status: He is alert.          PROCEDURES/IMAGING  X-Ray Chest AP Portable  Narrative: EXAMINATION:  XR CHEST AP PORTABLE    CLINICAL HISTORY:  hyperkalemia;    TECHNIQUE:  Single frontal view of the chest was performed.    COMPARISON:  Chest radiograph performed 04/24/2025, 08:27 hours.    FINDINGS:  Monitoring leads over the chest.  Loop recorder device.    Grossly unchanged cardiac contours    Chronic interstitial coarsening felt grossly similar when compared to prior examination performed 04/24/2025, 08:27 hours.    Some clearing at right basilar opacity seen on the 04/24/2025 radiograph.  Atelectasis versus resolving infectious or noninfectious inflammatory process considered.  Continued imaging follow-up to resolution with follow-up radiograph in 4-6 weeks would be recommended.    Persistent blunting of the costophrenic angles may relate to small effusions and or scar.    No definite pneumothorax.    No acute findings in the visualized abdomen.  Posttreatment sequela project in the upper abdomen    Osseous and soft tissue structures appear without definite acute change.  Impression: Please see discussion above.    Electronically signed by: Andrew Darby  Date:    05/16/2025  Time:    09:54         Discussion     Problem List:  Problem List Items Addressed This Visit       RESOLVED: Anemia    Relevant Orders    CBC Auto Differential (Completed)    FERRITIN (Completed)    Iron and TIBC (Completed)    EPO LEVEL (Completed)     Other Visit Diagnoses         Stage 5 chronic kidney disease not on chronic dialysis    -  Primary    Relevant Orders    CBC Auto Differential (Completed)    FERRITIN (Completed)    Iron and TIBC (Completed)    EPO LEVEL (Completed)          Anemia in CKD  - Hgb stable 8.3 g/dL  - Noted thrombocytopenia to 130K  - Ferritin 600s,  serum iron 55, iron saturation 17% (ideally would be > 25%)  - Epo level 21  - Recommend close follow up with nephrology for declining renal function which is contributing to his degree of anemia  - Consider IV iron to get iron sat to at least > 20%, then would continue Epo injections   - Discussed signs and symptoms of worsening anemia.  - RTC in 1 month for follow up       Emerita Laguerre PA-C  5/22/2025 10:58 AM   Hematology/Oncology  [facility] - [address], [address]  [place] [address]  Phone: [phone]  Fax: [phone]

## 2025-05-22 NOTE — PROGRESS NOTES
Name: Jarrett Charlton Jr.  MRN: [id number]   [facility]: [id number]      Date: 05/22/2025      History of Present Illness    CHIEF COMPLAINT:  Mr. Charlton presents today for balance problems and dizziness.    NEUROLOGICAL SYMPTOMS:  He reports sudden onset balance and dizziness issues without precipitating events. He experiences leg weakness resulting in collapse if unable to sit down promptly. He is unable to independently rise from falls without support, requiring use of furniture to pull himself up.  He denies is in vision speech or swallowing.  No lateralized symptoms.  Symptoms began approximately 2 months ago without VS provoked factors, but says that they occur suddenly.  He has multiple severe chronic systemic medical problems, and has been hospitalized several times in the last 6 months.  He is not on any significant sedating medication    RECENT HOSPITALIZATION:  He was recently hospitalized and discharged on Sunday due to elevated creatinine and potassium levels, requiring blood treatment and transfusion.    MEDICAL HISTORY:  He has a history of blood clots several months ago, type 2 diabetes and high cholesterol (both well-controlled with medication), and kidney problems with possible future need for dialysis. He underwent a neck fusion procedure in his 20s.  He has a history of hypertension, atrial fibrillation, orthostasis, gout, and diabetic polyneuropathy.  The patient also endorses a history of cervical fusion performed in his 20s and some remote back injury without any known imaging or intervention having been performed.    CURRENT MEDICATIONS:  He takes approximately 15 different medications including Eliquis for anticoagulation, Midodrine for low blood pressure, Gabapentin 200mg daily, and Prednisone 2.5mg.      ROS:  General: -fever, -chills, -fatigue, -weight gain, -weight loss  Eyes: +vision changes, -redness, -discharge  ENT: -ear pain, -nasal congestion, -sore throat  Cardiovascular: -chest pain,  -palpitations, -lower extremity edema  Respiratory: -cough, -shortness of breath  Gastrointestinal: -abdominal pain, -nausea, -vomiting, -diarrhea, -constipation, -blood in stool  Genitourinary: -dysuria, -hematuria, -frequency  Musculoskeletal: -joint pain, -muscle pain, +muscle weakness, +difficulty standing up, +abnormal gait  Skin: -rash, -lesion  Neurological: -headache, +dizziness, -numbness, -tingling, +involuntary movements, +tremors, +balance issues  Psychiatric: -anxiety, -depression, -sleep difficulty              Past Medical History: The patient  has a past medical history of Autonomic dysfunction (11/2023), Basal cell carcinoma, BPH (benign prostatic hyperplasia), CAD (coronary artery disease) (2017), Cancer of prostate, Chronic kidney disease, DDD (degenerative disc disease) (10/21/2013), Diabetes mellitus with renal complications, Disc disease, degenerative, cervical, DVT (deep venous thrombosis), Encounter for blood transfusion, GERD (gastroesophageal reflux disease), History of ulcerative colitis (1982), HLD (hyperlipidemia), Ileostomy in place (1982), RBBB, Squamous cell carcinoma (03/08/2018), Squamous cell carcinoma (04/12/2018), and Ventricular tachycardia.    Social History: The patient  reports that he has never smoked. He has never been exposed to tobacco smoke. He has never used smokeless tobacco. He reports that he does not drink alcohol and does not use drugs.    Family History: Their family history includes Cancer in his father; Dementia in his mother; Diabetes in his father; Heart disease in his father.    Allergies: Atorvastatin and Rosuvastatin     Meds: Scheduled Meds:   leuprolide acetate (6 month)  45 mg Intramuscular Q6 Months    leuprolide acetate (6 month)  45 mg Intramuscular Q6 Months     Continuous Infusions:  PRN Meds:.    Exam:  Physical Exam    General: No acute distress. Well-developed. Well-nourished.  Eyes: EOMI. Sclerae anicteric.  HENT: Normocephalic. Atraumatic.  "Nares patent. Moist oral mucosa.  Respiratory: Normal respiratory effort. Clear to auscultation bilaterally. No rales. No rhonchi. No wheezing.  Abdomen: Soft. Non-tender. Non-distended. Normoactive bowel sounds.  Musculoskeletal: No  obvious deformity.  Extremities: No lower extremity edema.  Neurological: Alert & oriented x3. No slurred speech. Gait unsteady. Wobbly with eyes open. No tremor observed.  Psychiatric: Normal mood. Normal affect. Good insight. Good judgment.           Ht 6' 2" (1.88 m)   Wt 79.1 kg (174 lb 6.1 oz)   BMI 22.39 kg/m²     Constitutional  Well-developed, well-nourished, appears stated age   Ophthalmoscopic  No papilledema with no hemorrhages or exudates bilaterally   Cardiovascular  Radial pulses 2+ and symmetric, no LE edema bilaterally   Neurological    * Mental status      - Orientation  Oriented to person, place, time, and situation     - Memory   Intact recent and remote     - Attention/concentration  Attentive, vigilant during exam     - Language  Naming & repetition intact, +2-step commands     - Fund of knowledge  Aware of current events     - Executive  Well-organized thoughts     - Other     * Cranial nerves       - CN II  PERRL, visual fields full to confrontation     - CN III, IV, VI  Extraocular movements full, normal pursuits and saccades     - CN V  Sensation V1 - V3 intact     - CN VII  Face strong and symmetric bilaterally     - CN VIII  Hearing intact bilaterally     - CN IX, X  Palate raises midline and symmetric     - CN XI  SCM and trapezius 5/5 bilaterally     - CN XII  Tongue midline   * Motor  Relative atrophy of R UE intrinsics, strength o/w 5/5 throughout   * Sensory   Decreased to light touch, temperature and vibration throughout BLE   * Coordination  No dysmetria with finger-to-nose or heel-to-shin   * Gait  Unsteady, not grossly ataxic   * Deep tendon reflexes  Absent throughout   Babinski downgoing bilaterally     Laboratory/Radiological:Reviewed  - " Results:  Admission on 05/16/2025, Discharged on 05/18/2025   Component Date Value Ref Range Status    QRS Duration 05/16/2025 124  ms Final    [facility] QTC Calculation 05/16/2025 487  ms Final    Sodium 05/16/2025 143  136 - 145 mmol/L Final    Potassium 05/16/2025 6.1 (H)  3.5 - 5.1 mmol/L Final    Chloride 05/16/2025 115 (H)  95 - 110 mmol/L Final    CO2 05/16/2025 17 (L)  23 - 29 mmol/L Final    Glucose 05/16/2025 97  70 - 110 mg/dL Final    BUN 05/16/2025 82 (H)  8 - 23 mg/dL Final    Creatinine 05/16/2025 6.8 (H)  0.5 - 1.4 mg/dL Final    Calcium 05/16/2025 7.8 (L)  8.7 - 10.5 mg/dL Final    Protein Total 05/16/2025 5.4 (L)  6.0 - 8.4 gm/dL Final    Albumin 05/16/2025 2.5 (L)  3.5 - 5.2 g/dL Final    Bilirubin Total 05/16/2025 0.4  0.1 - 1.0 mg/dL Final    ALP 05/16/2025 102  40 - 150 unit/L Final    AST 05/16/2025 21  11 - 45 unit/L Final    ALT 05/16/2025 19  10 - 44 unit/L Final    Anion Gap 05/16/2025 11  8 - 16 mmol/L Final    eGFR 05/16/2025 8 (L)  >60 mL/min/1.73/m2 Final    WBC 05/16/2025 5.66  3.90 - 12.70 K/uL Final    RBC 05/16/2025 2.26 (L)  4.60 - 6.20 M/uL Final    HGB 05/16/2025 6.7 (L)  14.0 - 18.0 gm/dL Final    HCT 05/16/2025 22.5 (L)  40.0 - 54.0 % Final    MCV 05/16/2025 100 (H)  82 - 98 fL Final    MCH 05/16/2025 29.6  27.0 - 31.0 pg Final    MCHC 05/16/2025 29.8 (L)  32.0 - 36.0 g/dL Final    RDW 05/16/2025 20.0 (H)  11.5 - 14.5 % Final    Platelet Count 05/16/2025 123 (L)  150 - 450 K/uL Final    MPV 05/16/2025 9.5  9.2 - 12.9 fL Final    Nucleated RBC 05/16/2025 0  <=0 /100 WBC Final    Neut % 05/16/2025 70.7  38 - 73 % Final    Lymph % 05/16/2025 19.3  18 - 48 % Final    Mono % 05/16/2025 6.4  4 - 15 % Final    Eos % 05/16/2025 2.7  <=8 % Final    Basophil % 05/16/2025 0.4  <=1.9 % Final    Imm Grans % 05/16/2025 0.5  0.0 - 0.5 % Final    Neut # 05/16/2025 4.01  1.8 - 7.7 K/uL Final    Lymph # 05/16/2025 1.09  1 - 4.8 K/uL Final    Mono # 05/16/2025 0.36  0.3 - 1 K/uL Final    Eos #  05/16/2025 0.15  <=0.5 K/uL Final    Baso # 05/16/2025 0.02  <=0.2 K/uL Final    Imm Grans # 05/16/2025 0.03  0.00 - 0.04 K/uL Final    Color, UA 05/16/2025 Colorless (A)  Straw, Stacy, Yellow, Light-Orange Final    Appearance, UA 05/16/2025 Clear  Clear Final    pH, UA 05/16/2025 6.0  5.0 - 8.0 Final    Spec Grav UA 05/16/2025 1.010  1.005 - 1.030 Final    Protein, UA 05/16/2025 Negative  Negative Final    Glucose, UA 05/16/2025 Negative  Negative Final    Ketones, UA 05/16/2025 Negative  Negative Final    Bilirubin, UA 05/16/2025 Negative  Negative Final    Blood, UA 05/16/2025 Negative  Negative Final    Nitrites, UA 05/16/2025 Negative  Negative Final    Urobilinogen, UA 05/16/2025 Negative  <2.0 EU/dL Final    Leukocyte Esterase, UA 05/16/2025 Negative  Negative Final    Magnesium  05/16/2025 1.4 (L)  1.6 - 2.6 mg/dL Final    POC Glucose 05/16/2025 96  70 - 110 mg/dL Final    POC BUN 05/16/2025 78 (H)  6 - 30 mg/dL Final    POC Creatinine 05/16/2025 7.7 (H)  0.5 - 1.4 mg/dL Final    POC Sodium 05/16/2025 142  136 - 145 mmol/L Final    POC Potassium 05/16/2025 6.0 (H)  3.5 - 5.1 mmol/L Final    POC Chloride 05/16/2025 112 (H)  95 - 110 mmol/L Final    POC TCO2 (MEASURED) 05/16/2025 17 (L)  23 - 29 mmol/L Final    POC Ionized Calcium 05/16/2025 1.10  1.06 - 1.42 mmol/L Final    POC Hematocrit 05/16/2025 23 (L)  36 - 54 %PCV Final    Sample 05/16/2025 SADAF   Final    Phosphorus Level 05/16/2025 6.1 (H)  2.7 - 4.5 mg/dL Final    Magnesium  05/16/2025 1.4 (L)  1.6 - 2.6 mg/dL Final    Specimen Outdate 05/16/2025 05/19/2025 23:59   Final    Group & Rh 05/16/2025 A POS   Final    Indirect Servando 05/16/2025 NEG   Final    UNIT NUMBER 05/16/2025 [id number]   Final    UNIT ABO/RH 05/16/2025 A POS   Final    DISPENSE STATUS 05/16/2025 Transfused   Final    Unit Expiration 05/16/2025 [id number]   Final    Product Code 05/16/2025 [id number]   Final    Unit Blood Type Code 05/16/2025 6200   Final    CROSSMATCH  INTERPRETATION 05/16/2025 Compatible   Final    Extra Tube 05/16/2025 Hold for add-ons.   Final    Sodium 05/16/2025 145  136 - 145 mmol/L Final    Potassium 05/16/2025 4.5  3.5 - 5.1 mmol/L Final    Chloride 05/16/2025 119 (H)  95 - 110 mmol/L Final    CO2 05/16/2025 16 (L)  23 - 29 mmol/L Final    Glucose 05/16/2025 63 (L)  70 - 110 mg/dL Final    BUN 05/16/2025 73 (H)  8 - 23 mg/dL Final    Creatinine 05/16/2025 5.7 (H)  0.5 - 1.4 mg/dL Final    Calcium 05/16/2025 7.1 (L)  8.7 - 10.5 mg/dL Final    Anion Gap 05/16/2025 10  8 - 16 mmol/L Final    eGFR 05/16/2025 10 (L)  >60 mL/min/1.73/m2 Final    POCT Glucose 05/16/2025 222 (H)  70 - 110 mg/dL Final    Sodium 05/16/2025 144  136 - 145 mmol/L Final    Potassium 05/16/2025 5.0  3.5 - 5.1 mmol/L Final    Chloride 05/16/2025 115 (H)  95 - 110 mmol/L Final    CO2 05/16/2025 18 (L)  23 - 29 mmol/L Final    Glucose 05/16/2025 71  70 - 110 mg/dL Final    BUN 05/16/2025 83 (H)  8 - 23 mg/dL Final    Creatinine 05/16/2025 6.2 (H)  0.5 - 1.4 mg/dL Final    Calcium 05/16/2025 8.0 (L)  8.7 - 10.5 mg/dL Final    Anion Gap 05/16/2025 11  8 - 16 mmol/L Final    eGFR 05/16/2025 9 (L)  >60 mL/min/1.73/m2 Final    Urine Urea Nitrogen 05/16/2025 327  140 - 1,050 mg/dL Final    Urine Osmolality 05/16/2025 347  50 - 1,200 mOsm/kg Final    Urine Creatinine 05/16/2025 41.0  23.0 - 375.0 mg/dL Final    Urine Sodium 05/16/2025 51  20 - 250 mmol/L Final    PTH Intact 05/16/2025 213.4 (H)  9.0 - 77.0 pg/mL Final    POCT Glucose 05/16/2025 79  70 - 110 mg/dL Final    POCT Glucose 05/16/2025 219 (H)  70 - 110 mg/dL Final    Sodium 05/17/2025 144  136 - 145 mmol/L Final    Potassium 05/17/2025 5.0  3.5 - 5.1 mmol/L Final    Chloride 05/17/2025 114 (H)  95 - 110 mmol/L Final    CO2 05/17/2025 18 (L)  23 - 29 mmol/L Final    Glucose 05/17/2025 103  70 - 110 mg/dL Final    BUN 05/17/2025 80 (H)  8 - 23 mg/dL Final    Creatinine 05/17/2025 6.6 (H)  0.5 - 1.4 mg/dL Final    Calcium 05/17/2025 7.8  (L)  8.7 - 10.5 mg/dL Final    Anion Gap 05/17/2025 12  8 - 16 mmol/L Final    eGFR 05/17/2025 8 (L)  >60 mL/min/1.73/m2 Final    Magnesium  05/17/2025 1.6  1.6 - 2.6 mg/dL Final    Phosphorus Level 05/17/2025 7.2 (H)  2.7 - 4.5 mg/dL Final    WBC 05/17/2025 5.23  3.90 - 12.70 K/uL Final    RBC 05/17/2025 2.40 (L)  4.60 - 6.20 M/uL Final    HGB 05/17/2025 7.3 (L)  14.0 - 18.0 gm/dL Final    HCT 05/17/2025 23.3 (L)  40.0 - 54.0 % Final    MCV 05/17/2025 97  82 - 98 fL Final    MCHC 05/17/2025 31.3 (L)  32.0 - 36.0 g/dL Final    RDW 05/17/2025 19.2 (H)  11.5 - 14.5 % Final    Platelet Count 05/17/2025 112 (L)  150 - 450 K/uL Final    MCH 05/17/2025 30.4  27.0 - 31.0 pg Final    MPV 05/17/2025 9.9  9.2 - 12.9 fL Final    BNP 05/17/2025 1,302 (H)  0 - 99 pg/mL Final    POCT Glucose 05/17/2025 120 (H)  70 - 110 mg/dL Final    POCT Glucose 05/17/2025 186 (H)  70 - 110 mg/dL Final    POCT Glucose 05/17/2025 216 (H)  70 - 110 mg/dL Final    POCT Glucose 05/17/2025 216 (H)  70 - 110 mg/dL Final    Sodium 05/18/2025 138  136 - 145 mmol/L Final    Potassium 05/18/2025 5.0  3.5 - 5.1 mmol/L Final    Chloride 05/18/2025 109  95 - 110 mmol/L Final    CO2 05/18/2025 18 (L)  23 - 29 mmol/L Final    Glucose 05/18/2025 152 (H)  70 - 110 mg/dL Final    BUN 05/18/2025 79 (H)  8 - 23 mg/dL Final    Creatinine 05/18/2025 6.3 (H)  0.5 - 1.4 mg/dL Final    Calcium 05/18/2025 7.9 (L)  8.7 - 10.5 mg/dL Final    Anion Gap 05/18/2025 11  8 - 16 mmol/L Final    eGFR 05/18/2025 9 (L)  >60 mL/min/1.73/m2 Final    Magnesium  05/18/2025 2.0  1.6 - 2.6 mg/dL Final    Phosphorus Level 05/18/2025 6.9 (H)  2.7 - 4.5 mg/dL Final    WBC 05/18/2025 6.27  3.90 - 12.70 K/uL Final    RBC 05/18/2025 2.57 (L)  4.60 - 6.20 M/uL Final    HGB 05/18/2025 7.8 (L)  14.0 - 18.0 gm/dL Final    HCT 05/18/2025 24.7 (L)  40.0 - 54.0 % Final    MCV 05/18/2025 96  82 - 98 fL Final    MCHC 05/18/2025 31.6 (L)  32.0 - 36.0 g/dL Final    RDW 05/18/2025 18.0 (H)  11.5 - 14.5 %  Final    Platelet Count 05/18/2025 114 (L)  150 - 450 K/uL Final    MCH 05/18/2025 30.4  27.0 - 31.0 pg Final    MPV 05/18/2025 9.8  9.2 - 12.9 fL Final    POCT Glucose 05/18/2025 165 (H)  70 - 110 mg/dL Final    POCT Glucose 05/18/2025 183 (H)  70 - 110 mg/dL Final    POCT Glucose 05/18/2025 198 (H)  70 - 110 mg/dL Final   Office Visit on 05/07/2025   Component Date Value Ref Range Status    WBC 05/12/2025 7.0  3.8 - 10.8 Thousand/uL Final    RBC 05/12/2025 2.32 (L)  4.20 - 5.80 Million/uL Final    Hemoglobin 05/12/2025 6.9 (L)  13.2 - 17.1 g/dL Final    Hematocrit 05/12/2025 23.0 (L)  38.5 - 50.0 % Final    MCV 05/12/2025 99.1  80.0 - 100.0 fL Final    MCH 05/12/2025 29.7  27.0 - 33.0 pg Final    MCHC 05/12/2025 30.0 (L)  32.0 - 36.0 g/dL Final    RDW 05/12/2025 18.8 (H)  11.0 - 15.0 % Final    Platelets 05/12/2025 158  140 - 400 Thousand/uL Final    MPV 05/12/2025 9.6  7.5 - 12.5 fL Final    Neutrophils, Abs 05/12/2025 5,299  1,500 - 7,800 cells/uL Final    Lymph # 05/12/2025 1,183  850 - 3,900 cells/uL Final    Mono # 05/12/2025 385  200 - 950 cells/uL Final    Eos # 05/12/2025 112  15 - 500 cells/uL Final    Baso # 05/12/2025 21  0 - 200 cells/uL Final    Neutrophils Relative 05/12/2025 75.7  % Final    Lymph % 05/12/2025 16.9  % Final    Mono % 05/12/2025 5.5  % Final    Eosinophil % 05/12/2025 1.6  % Final    Basophil % 05/12/2025 0.3  % Final    Differential Comment 05/12/2025 Anisocytosis 1 + Schistocytes 1 + Tear-drop cells 1 + Pauline cells 1 +   Final    Glucose 05/12/2025 109  65 - 139 mg/dL Final    BUN 05/12/2025 90 (H)  7 - 25 mg/dL Final    Creatinine 05/12/2025 6.89 (H)  0.70 - 1.28 mg/dL Final    eGFR 05/12/2025 8 (L)  > OR = 60 mL/min/1.73m2 Final    BUN/Creatinine Ratio 05/12/2025 13  6 - 22 (calc) Final    Sodium 05/12/2025 142  135 - 146 mmol/L Final    Potassium 05/12/2025 6.3 (HH)  3.5 - 5.3 mmol/L Final    Chloride 05/12/2025 114 (H)  98 - 110 mmol/L Final    CO2 05/12/2025 19 (L)  20 - 32  mmol/L Final    Calcium 05/12/2025 7.9 (L)  8.6 - 10.3 mg/dL Final    Total Protein 05/12/2025 5.1 (L)  6.1 - 8.1 g/dL Final    Albumin 05/12/2025 3.0 (L)  3.6 - 5.1 g/dL Final    Globulin, Total 05/12/2025 2.1  1.9 - 3.7 g/dL (calc) Final    Albumin/Globulin Ratio 05/12/2025 1.4  1.0 - 2.5 (calc) Final    Total Bilirubin 05/12/2025 0.5  0.2 - 1.2 mg/dL Final    Alkaline Phosphatase 05/12/2025 113  35 - 144 U/L Final    AST 05/12/2025 17  10 - 35 U/L Final    ALT 05/12/2025 18  9 - 46 U/L Final   Lab Visit on 05/07/2025   Component Date Value Ref Range Status    Uric Acid 05/07/2025 5.4  3.4 - 7.0 mg/dL Final    Sodium 05/07/2025 140  136 - 145 mmol/L Final    Potassium 05/07/2025 6.8 (HH)  3.5 - 5.1 mmol/L Final    Chloride 05/07/2025 113 (H)  95 - 110 mmol/L Final    CO2 05/07/2025 17 (L)  23 - 29 mmol/L Final    Glucose 05/07/2025 96  70 - 110 mg/dL Final    BUN 05/07/2025 89 (H)  8 - 23 mg/dL Final    Creatinine 05/07/2025 7.1 (H)  0.5 - 1.4 mg/dL Final    Calcium 05/07/2025 8.2 (L)  8.7 - 10.5 mg/dL Final    Protein Total 05/07/2025 5.6 (L)  6.0 - 8.4 gm/dL Final    Albumin 05/07/2025 2.5 (L)  3.5 - 5.2 g/dL Final    Bilirubin Total 05/07/2025 0.4  0.1 - 1.0 mg/dL Final    ALP 05/07/2025 96  40 - 150 unit/L Final    AST 05/07/2025 34  11 - 45 unit/L Final    ALT 05/07/2025 36  10 - 44 unit/L Final    Anion Gap 05/07/2025 10  8 - 16 mmol/L Final    eGFR 05/07/2025 7 (L)  >60 mL/min/1.73/m2 Final    Cholesterol Total 05/07/2025 113 (L)  120 - 199 mg/dL Final    Triglyceride 05/07/2025 45  30 - 150 mg/dL Final    HDL Cholesterol 05/07/2025 41  40 - 75 mg/dL Final    LDL Cholesterol 05/07/2025 63.0  63.0 - 159.0 mg/dL Final    HDL/Cholesterol Ratio 05/07/2025 36.3  20.0 - 50.0 % Final    Cholesterol/HDL Ratio 05/07/2025 2.8  2.0 - 5.0 Final    Non HDL Cholesterol 05/07/2025 72  mg/dL Final    TSH 05/07/2025 5.258 (H)  0.400 - 4.000 uIU/mL Final    Free T4 05/07/2025 0.88  0.71 - 1.51 ng/dL Final    Vitamin D  05/07/2025 24 (L)  30 - 96 ng/mL Final    Hemoglobin A1c 05/07/2025 5.7 (H)  4.0 - 5.6 % Final    Estimated Average Glucose 05/07/2025 117  68 - 131 mg/dL Final    Vitamin B12 05/07/2025 513  210 - 950 pg/mL Final    Folate Level 05/07/2025 6.6  4.0 - 24.0 ng/mL Final    Magnesium  05/07/2025 1.5 (L)  1.6 - 2.6 mg/dL Final    Phosphorus Level 05/07/2025 4.8 (H)  2.7 - 4.5 mg/dL Final    WBC 05/07/2025 7.68  3.90 - 12.70 K/uL Final    RBC 05/07/2025 2.40 (L)  4.60 - 6.20 M/uL Final    HGB 05/07/2025 7.1 (L)  14.0 - 18.0 gm/dL Final    HCT 05/07/2025 24.3 (L)  40.0 - 54.0 % Final    MCV 05/07/2025 101 (H)  82 - 98 fL Final    MCH 05/07/2025 29.6  27.0 - 31.0 pg Final    MCHC 05/07/2025 29.2 (L)  32.0 - 36.0 g/dL Final    RDW 05/07/2025 20.1 (H)  11.5 - 14.5 % Final    Platelet Count 05/07/2025 183  150 - 450 K/uL Final    MPV 05/07/2025 8.9 (L)  9.2 - 12.9 fL Final    Nucleated RBC 05/07/2025 0  <=0 /100 WBC Final    Neut % 05/07/2025 72.8  38 - 73 % Final    Lymph % 05/07/2025 16.8 (L)  18 - 48 % Final    Mono % 05/07/2025 6.4  4 - 15 % Final    Eos % 05/07/2025 1.8  <=8 % Final    Basophil % 05/07/2025 0.1  <=1.9 % Final    Imm Grans % 05/07/2025 2.1 (H)  0.0 - 0.5 % Final    Neut # 05/07/2025 5.59  1.8 - 7.7 K/uL Final    Lymph # 05/07/2025 1.29  1 - 4.8 K/uL Final    Mono # 05/07/2025 0.49  0.3 - 1 K/uL Final    Eos # 05/07/2025 0.14  <=0.5 K/uL Final    Baso # 05/07/2025 0.01  <=0.2 K/uL Final    Imm Grans # 05/07/2025 0.16 (H)  0.00 - 0.04 K/uL Final    Color,  05/07/2025 Yellow  Straw, Stacy, Yellow, Light-Orange Final    Appearance,  05/07/2025 Clear  Clear Final    pH,  05/07/2025 6.0  5.0 - 8.0 Final    Spec Grav  05/07/2025 1.010  1.005 - 1.030 Final    Protein,  05/07/2025 Trace (A)  Negative Final    Glucose, UA 05/07/2025 Negative  Negative Final    Ketones, UA 05/07/2025 Negative  Negative Final    Bilirubin, UA 05/07/2025 Negative  Negative Final    Blood, UA 05/07/2025 Negative  Negative  Final    Nitrites, UA 05/07/2025 Negative  Negative Final    Urobilinogen, UA 05/07/2025 Negative  <2.0 EU/dL Final    Leukocyte Esterase, UA 05/07/2025 Negative  Negative Final    Extra Tube 05/07/2025 Hold for add-ons.   Final   Hospital Outpatient Visit on 05/01/2025   Component Date Value Ref Range Status    85% Max Predicted HR 05/01/2025 122   Final    Max Predicted HR 05/01/2025 144   Final    [facility] CV CPX PATIENT IS MALE 05/01/2025 1.0   Final    [facility] CV CPX PATIENT IS FEMALE 05/01/2025 0.0   Final    dose 05/01/2025 10.8  mcg/kg/min Final    dose 05/01/2025 31.1  mcg/kg/min Final    HR at rest 05/01/2025 62  bpm Final    Systolic blood pressure 05/01/2025 143  mmHg Final    Diastolic blood pressure 05/01/2025 75  mmHg Final    RPP 05/01/2025 8,866   Final    Peak HR 05/01/2025 62  bpm Final    Peak Systolic BP 05/01/2025 141  mmHg Final    Peak Diatolic BP 05/01/2025 77  mmHg Final    Peak RPP 05/01/2025 8,742   Final    % Max HR Achieved 05/01/2025 43   Final    dose 05/01/2025 0.4  mg Final    1 Minute Recovery HR 05/01/2025 66  bpm Final    EF + QEF 05/01/2025 47  % Final    Ejection Fraction- High Stress 05/01/2025 59  % Final    End diastolic index (mL/m2) 05/01/2025 91  mL/m2 Final    End diastolic index (mL/m2) 05/01/2025 155  mL/m2 Final    End systolic index (mL/m2) 05/01/2025 40  mL/m2 Final    End systolic index (mL/m2) 05/01/2025 78  mL/m2 Final    Nuc Rest EF 05/01/2025 49   Final    Nuc Rest Diastolic Volume Index 05/01/2025 186   Final    Nuc Rest Systolic Volume Index 05/01/2025 95   Final    Nuc Stress EF 05/01/2025 52  % Final    Nuc Rest Diastolic Volume Index 05/01/2025 171   Final    Nuc Rest Systolic Volume Index 05/01/2025 82   Final   No results displayed because visit has over 200 results.      Lab Visit on 04/23/2025   Component Date Value Ref Range Status    Color, UA 04/23/2025 Yellow  Straw, Stacy, Yellow, Light-Orange Final    Appearance, UA 04/23/2025 Clear  Clear Final    pH, UA  04/23/2025 6.0  5.0 - 8.0 Final    Spec Grav UA 04/23/2025 1.010  1.005 - 1.030 Final    Protein, UA 04/23/2025 Negative  Negative Final    Glucose, UA 04/23/2025 Negative  Negative Final    Ketones, UA 04/23/2025 Negative  Negative Final    Bilirubin, UA 04/23/2025 Negative  Negative Final    Blood, UA 04/23/2025 Negative  Negative Final    Nitrites, UA 04/23/2025 Negative  Negative Final    Urobilinogen, UA 04/23/2025 Negative  <2.0 EU/dL Final    Leukocyte Esterase, UA 04/23/2025 Negative  Negative Final       - Independent review of images:  Review of CT scan of the head performed last month demonstrates fairly minimal age appropriate changes, without gross evidence of large distribution stroke or overly prominent microvascular disease.      Assessment & Plan    R26.9 Abnormal gait    Orthostasis    Diabetic polyneuropathy    History of cervical and lumbar spinal disease    IMPRESSION:  - Complex medical history including diabetes, HTN, high cholesterol, and recent hospitalization for kidney issues.  - Suspect possible minor stroke not detected on previous CT due to sudden onset of symptoms and risk factors.  - Evaluated for other potential causes of balance issues and weakness.  - Reviewed extensive medication list, noting midodrine for orthostatic hypotension may contribute to balance problems.  - Assessed thyroid function, B12, and folate levels from recent labs, which appear normal.  - Evaluated gabapentin use in context of renal function, though current low dose likely not problematic. Consider discontinuing gabapentin 100 mg twice daily for 1 week to assess impact on balance and neuropathy pain. If pain returns significantly, resume medication.  - Potential cervical spine issues due to history of fusion in 20s, though reflexes not currently suggestive of neck problem.  - Explained that sum of medical problems and necessary medications may be contributing to balance issues.    ABNORMAL GAIT:  - Explained  limitations of CT in detecting small strokes compared to MRI.  - Ordered MRI Head.  - Ordered Neck Arteries US.  - Ordered MRI Neck and Back.  - Discussed anticoagulants (Eliquis) and associated risk of bruising and bleeding.  - Referred to physical therapy for balance issues, preferably at a rehab facility with more equipment.  - Follow up after completion of ordered scans to review results.  - Mr. Charlton's preferred contact can call to make appointments.    PLAN SUMMARY:  - Referral to physical therapy for balance issues  - MRI/MRA Head ordered  - MRI Neck and Back ordered  - Neck Arteries US ordered  - Discussed anticoagulants (Eliquis) and associated risks  - Follow-up after completion of ordered scans to review results          Risks/benefits, potential side effects of medications, and alternative therapies discussed as appropriate.    This note was generated with the assistance of ambient listening technology. Verbal consent was obtained by the patient and accompanying visitor(s) for the recording of patient appointment to facilitate this note. I attest to having reviewed and edited the generated note for accuracy, though some syntax or spelling errors may persist. Please contact the author of this note for any clarification.       KALEN Patel M.D.

## 2025-05-26 ENCOUNTER — HOSPITAL ENCOUNTER (INPATIENT)
Facility: OTHER | Age: 75
LOS: 8 days | Discharge: HOME HEALTH | DRG: 291 | End: 2025-06-04
Attending: EMERGENCY MEDICINE | Admitting: HOSPITALIST
Payer: MEDICARE

## 2025-05-26 DIAGNOSIS — I63.9 ACUTE ISCHEMIC STROKE: ICD-10-CM

## 2025-05-26 DIAGNOSIS — I48.0 PAROXYSMAL ATRIAL FIBRILLATION: ICD-10-CM

## 2025-05-26 DIAGNOSIS — M25.461 EFFUSION OF RIGHT KNEE: ICD-10-CM

## 2025-05-26 DIAGNOSIS — L03.116 CELLULITIS OF LEFT FOOT: ICD-10-CM

## 2025-05-26 DIAGNOSIS — N18.6 ESRD (END STAGE RENAL DISEASE): ICD-10-CM

## 2025-05-26 DIAGNOSIS — N18.6 ANEMIA IN END-STAGE RENAL DISEASE: ICD-10-CM

## 2025-05-26 DIAGNOSIS — M48.02 CERVICAL SPINAL STENOSIS: Primary | ICD-10-CM

## 2025-05-26 DIAGNOSIS — R07.9 CHEST PAIN: ICD-10-CM

## 2025-05-26 DIAGNOSIS — M79.89 LEG SWELLING: ICD-10-CM

## 2025-05-26 DIAGNOSIS — D63.1 ANEMIA IN END-STAGE RENAL DISEASE: ICD-10-CM

## 2025-05-26 PROBLEM — R29.6 FALLS: Status: ACTIVE | Noted: 2025-05-26

## 2025-05-26 PROBLEM — R05.3 CHRONIC COUGH: Status: RESOLVED | Noted: 2024-07-06 | Resolved: 2025-05-26

## 2025-05-26 PROBLEM — E03.9 HYPOTHYROIDISM: Status: ACTIVE | Noted: 2025-05-26

## 2025-05-26 PROBLEM — J10.1 INFLUENZA A: Status: RESOLVED | Noted: 2025-02-27 | Resolved: 2025-05-26

## 2025-05-26 PROBLEM — N17.9 AKI (ACUTE KIDNEY INJURY): Status: RESOLVED | Noted: 2017-12-05 | Resolved: 2025-05-26

## 2025-05-26 PROBLEM — R10.32 LEFT GROIN PAIN: Status: ACTIVE | Noted: 2025-05-26

## 2025-05-26 PROBLEM — R79.89 ABNORMAL LFTS (LIVER FUNCTION TESTS): Status: RESOLVED | Noted: 2017-12-06 | Resolved: 2025-05-26

## 2025-05-26 PROBLEM — R53.81 DEBILITY: Status: ACTIVE | Noted: 2025-05-26

## 2025-05-26 PROBLEM — I50.43 ACUTE ON CHRONIC COMBINED SYSTOLIC AND DIASTOLIC CONGESTIVE HEART FAILURE: Status: ACTIVE | Noted: 2024-09-24

## 2025-05-26 PROBLEM — Z71.89 ACP (ADVANCE CARE PLANNING): Status: ACTIVE | Noted: 2025-05-26

## 2025-05-26 PROBLEM — D64.9 ANEMIA: Status: RESOLVED | Noted: 2024-09-24 | Resolved: 2025-05-26

## 2025-05-26 PROBLEM — I50.42 CHRONIC COMBINED SYSTOLIC AND DIASTOLIC HEART FAILURE: Status: RESOLVED | Noted: 2024-08-09 | Resolved: 2025-05-26

## 2025-05-26 PROBLEM — N18.4 ACUTE RENAL FAILURE SUPERIMPOSED ON STAGE 4 CHRONIC KIDNEY DISEASE: Status: RESOLVED | Noted: 2019-12-28 | Resolved: 2025-05-26

## 2025-05-26 PROBLEM — N17.9 ACUTE RENAL FAILURE SUPERIMPOSED ON STAGE 4 CHRONIC KIDNEY DISEASE: Status: RESOLVED | Noted: 2019-12-28 | Resolved: 2025-05-26

## 2025-05-26 PROBLEM — R94.31 ABNORMAL EKG: Status: RESOLVED | Noted: 2022-12-06 | Resolved: 2025-05-26

## 2025-05-26 PROBLEM — U07.1 COVID-19 VIRUS INFECTION: Status: RESOLVED | Noted: 2024-07-09 | Resolved: 2025-05-26

## 2025-05-26 PROBLEM — M54.2 NECK PAIN ON RIGHT SIDE: Status: ACTIVE | Noted: 2025-05-26

## 2025-05-26 PROBLEM — Z86.718 HISTORY OF DEEP VENOUS THROMBOSIS (DVT) OF DISTAL VEIN OF LEFT LOWER EXTREMITY: Status: ACTIVE | Noted: 2025-01-24

## 2025-05-26 PROBLEM — R60.0 LOWER EXTREMITY EDEMA: Status: RESOLVED | Noted: 2024-10-28 | Resolved: 2025-05-26

## 2025-05-26 LAB
ABSOLUTE EOSINOPHIL (OHS): 0.14 K/UL
ABSOLUTE EOSINOPHIL (OHS): 0.22 K/UL
ABSOLUTE MONOCYTE (OHS): 0.56 K/UL (ref 0.3–1)
ABSOLUTE MONOCYTE (OHS): 0.59 K/UL (ref 0.3–1)
ABSOLUTE NEUTROPHIL COUNT (OHS): 4.17 K/UL (ref 1.8–7.7)
ABSOLUTE NEUTROPHIL COUNT (OHS): 5.22 K/UL (ref 1.8–7.7)
ALBUMIN SERPL BCP-MCNC: 2.9 G/DL (ref 3.5–5.2)
ALP SERPL-CCNC: 93 UNIT/L (ref 40–150)
ALT SERPL W/O P-5'-P-CCNC: 21 UNIT/L (ref 10–44)
ANION GAP (OHS): 14 MMOL/L (ref 8–16)
ANION GAP (OHS): 15 MMOL/L (ref 8–16)
AORTIC SIZE INDEX: 1.7 CM/M2
APPEARANCE FLD: NORMAL
APTT PPP: 30.8 SECONDS (ref 21–32)
APTT PPP: 31.3 SECONDS (ref 21–32)
APTT PPP: 49 SECONDS (ref 21–32)
ASCENDING AORTA: 3.4 CM
AST SERPL-CCNC: 17 UNIT/L (ref 11–45)
AV INDEX (PROSTH): 0.71
AV MEAN GRADIENT: 7 MMHG
AV PEAK GRADIENT: 14 MMHG
AV VALVE AREA BY VELOCITY RATIO: 2.6 CM²
AV VALVE AREA: 2.5 CM²
AV VELOCITY RATIO: 0.74
BASOPHILS # BLD AUTO: 0.01 K/UL
BASOPHILS # BLD AUTO: 0.01 K/UL
BASOPHILS NFR BLD AUTO: 0.1 %
BASOPHILS NFR BLD AUTO: 0.2 %
BILIRUB SERPL-MCNC: 0.7 MG/DL (ref 0.1–1)
BNP SERPL-MCNC: 1253 PG/ML (ref 0–99)
BSA FOR ECHO PROCEDURE: 1.99 M2
BUN SERPL-MCNC: 90 MG/DL (ref 8–23)
BUN SERPL-MCNC: 94 MG/DL (ref 8–23)
CALCIUM SERPL-MCNC: 7.8 MG/DL (ref 8.7–10.5)
CALCIUM SERPL-MCNC: 7.9 MG/DL (ref 8.7–10.5)
CHLORIDE SERPL-SCNC: 107 MMOL/L (ref 95–110)
CHLORIDE SERPL-SCNC: 110 MMOL/L (ref 95–110)
CO2 SERPL-SCNC: 16 MMOL/L (ref 23–29)
CO2 SERPL-SCNC: 19 MMOL/L (ref 23–29)
COLOR FLD: YELLOW
CREAT SERPL-MCNC: 7.8 MG/DL (ref 0.5–1.4)
CREAT SERPL-MCNC: 7.8 MG/DL (ref 0.5–1.4)
CRYSTAL, BODY FLUID (OHS): NORMAL
CV ECHO LV RWT: 0.35 CM
DOP CALC AO PEAK VEL: 1.9 M/S
DOP CALC AO VTI: 36.8 CM
DOP CALC LVOT AREA: 3.5 CM2
DOP CALC LVOT DIAMETER: 2.1 CM
DOP CALC LVOT PEAK VEL: 1.4 M/S
DOP CALC LVOT STROKE VOLUME: 90.7 CM3
DOP CALC RVOT PEAK VEL: 0.85 M/S
DOP CALCLVOT PEAK VEL VTI: 26.2 CM
E WAVE DECELERATION TIME: 331 MSEC
E/A RATIO: 0.63
E/E' RATIO: 8 M/S
ECHO LV POSTERIOR WALL: 1 CM (ref 0.6–1.1)
EJECTION FRACTION: 60 %
ERYTHROCYTE [DISTWIDTH] IN BLOOD BY AUTOMATED COUNT: 18.5 % (ref 11.5–14.5)
ERYTHROCYTE [DISTWIDTH] IN BLOOD BY AUTOMATED COUNT: 18.6 % (ref 11.5–14.5)
FRACTIONAL SHORTENING: 35.1 % (ref 28–44)
GFR SERPLBLD CREATININE-BSD FMLA CKD-EPI: 7 ML/MIN/1.73/M2
GFR SERPLBLD CREATININE-BSD FMLA CKD-EPI: 7 ML/MIN/1.73/M2
GLOBAL LONGITUIDAL STRAIN: 15.6 %
GLUCOSE SERPL-MCNC: 102 MG/DL (ref 70–110)
GLUCOSE SERPL-MCNC: 91 MG/DL (ref 70–110)
HCT VFR BLD AUTO: 26 % (ref 40–54)
HCT VFR BLD AUTO: 27 % (ref 40–54)
HGB BLD-MCNC: 8.1 GM/DL (ref 14–18)
HGB BLD-MCNC: 8.6 GM/DL (ref 14–18)
HOLD SPECIMEN: 1
IMM GRANULOCYTES # BLD AUTO: 0.04 K/UL (ref 0–0.04)
IMM GRANULOCYTES # BLD AUTO: 0.05 K/UL (ref 0–0.04)
IMM GRANULOCYTES NFR BLD AUTO: 0.6 % (ref 0–0.5)
IMM GRANULOCYTES NFR BLD AUTO: 0.8 % (ref 0–0.5)
INR PPP: 1.2 (ref 0.8–1.2)
INTERVENTRICULAR SEPTUM: 1.3 CM (ref 0.6–1.1)
IVC DIAMETER: 2.23 CM
LA MAJOR: 7.1 CM
LA MINOR: 7.4 CM
LEFT ATRIUM AREA SYSTOLIC (APICAL 2 CHAMBER): 30.32 CM2
LEFT ATRIUM AREA SYSTOLIC (APICAL 4 CHAMBER): 29.05 CM2
LEFT ATRIUM SIZE: 4.8 CM
LEFT ATRIUM VOLUME INDEX MOD: 48 ML/M2
LEFT ATRIUM VOLUME MOD: 97 ML
LEFT INTERNAL DIMENSION IN SYSTOLE: 3.7 CM (ref 2.1–4)
LEFT VENTRICLE DIASTOLIC VOLUME INDEX: 79.7 ML/M2
LEFT VENTRICLE DIASTOLIC VOLUME: 161 ML
LEFT VENTRICLE END SYSTOLIC VOLUME APICAL 2 CHAMBER: 101.69 ML
LEFT VENTRICLE END SYSTOLIC VOLUME APICAL 4 CHAMBER: 92.32 ML
LEFT VENTRICLE MASS INDEX: 134.9 G/M2
LEFT VENTRICLE SYSTOLIC VOLUME INDEX: 29.2 ML/M2
LEFT VENTRICLE SYSTOLIC VOLUME: 59 ML
LEFT VENTRICULAR INTERNAL DIMENSION IN DIASTOLE: 5.7 CM (ref 3.5–6)
LEFT VENTRICULAR MASS: 272.5 G
LV LATERAL E/E' RATIO: 5.7 M/S
LV SEPTAL E/E' RATIO: 14.3 M/S
LVED V (TEICH): 160.53 ML
LVES V (TEICH): 58.53 ML
LVOT MG: 4.35 MMHG
LVOT MV: 0.98 CM/S
LYMPHOCYTES # BLD AUTO: 0.79 K/UL (ref 1–4.8)
LYMPHOCYTES # BLD AUTO: 1.27 K/UL (ref 1–4.8)
LYMPHOCYTES NFR FLD MANUAL: 3 %
MAGNESIUM SERPL-MCNC: 1.6 MG/DL (ref 1.6–2.6)
MAGNESIUM SERPL-MCNC: 1.7 MG/DL (ref 1.6–2.6)
MCH RBC QN AUTO: 31 PG (ref 27–31)
MCH RBC QN AUTO: 31.4 PG (ref 27–31)
MCHC RBC AUTO-ENTMCNC: 31.2 G/DL (ref 32–36)
MCHC RBC AUTO-ENTMCNC: 31.9 G/DL (ref 32–36)
MCV RBC AUTO: 100 FL (ref 82–98)
MCV RBC AUTO: 99 FL (ref 82–98)
MONOS+MACROS NFR FLD MANUAL: 7 %
MV PEAK A VEL: 0.9 M/S
MV PEAK E VEL: 0.57 M/S
MV STENOSIS PRESSURE HALF TIME: 96.08 MS
MV VALVE AREA P 1/2 METHOD: 2.29 CM2
NEUTROPHILS NFR FLD MANUAL: 90 %
NUCLEATED RBC (/100WBC) (OHS): 0 /100 WBC
NUCLEATED RBC (/100WBC) (OHS): 0 /100 WBC
OHS CV RV/LV RATIO: 0.84 CM
OHS QRS DURATION: 132 MS
OHS QTC CALCULATION: 474 MS
PISA MRMAX VEL: 5.5 M/S
PISA TR MAX VEL: 3.8 M/S
PLATELET # BLD AUTO: 135 K/UL (ref 150–450)
PLATELET # BLD AUTO: 146 K/UL (ref 150–450)
PMV BLD AUTO: 10 FL (ref 9.2–12.9)
PMV BLD AUTO: 9.5 FL (ref 9.2–12.9)
POCT GLUCOSE: 113 MG/DL (ref 70–110)
POTASSIUM SERPL-SCNC: 4.2 MMOL/L (ref 3.5–5.1)
POTASSIUM SERPL-SCNC: 4.2 MMOL/L (ref 3.5–5.1)
PROT SERPL-MCNC: 6.2 GM/DL (ref 6–8.4)
PROTHROMBIN TIME: 13 SECONDS (ref 9–12.5)
PV PEAK GRADIENT: 7 MMHG
PV PEAK VELOCITY: 1.29 M/S
RA MAJOR: 6.3 CM
RA PRESSURE ESTIMATED: 15 MMHG
RBC # BLD AUTO: 2.61 M/UL (ref 4.6–6.2)
RBC # BLD AUTO: 2.74 M/UL (ref 4.6–6.2)
RELATIVE EOSINOPHIL (OHS): 2.1 %
RELATIVE EOSINOPHIL (OHS): 3.5 %
RELATIVE LYMPHOCYTE (OHS): 11.6 % (ref 18–48)
RELATIVE LYMPHOCYTE (OHS): 20.2 % (ref 18–48)
RELATIVE MONOCYTE (OHS): 8.7 % (ref 4–15)
RELATIVE MONOCYTE (OHS): 8.9 % (ref 4–15)
RELATIVE NEUTROPHIL (OHS): 66.4 % (ref 38–73)
RELATIVE NEUTROPHIL (OHS): 76.9 % (ref 38–73)
RIGHT VENTRICLE DIASTOLIC BASEL DIMENSION: 4.8 CM
RV TB RVSP: 19 MMHG
RV TISSUE DOPPLER FREE WALL SYSTOLIC VELOCITY 1 (APICAL 4 CHAMBER VIEW): 13.29 CM/S
SODIUM SERPL-SCNC: 140 MMOL/L (ref 136–145)
SODIUM SERPL-SCNC: 141 MMOL/L (ref 136–145)
STJ: 2.7 CM
TDI LATERAL: 0.1 M/S
TDI SEPTAL: 0.04 M/S
TDI: 0.07 M/S
TR MAX PG: 57 MMHG
TROPONIN I SERPL DL<=0.01 NG/ML-MCNC: 0.03 NG/ML
TV REST PULMONARY ARTERY PRESSURE: 73 MMHG
WBC # BLD AUTO: 6.28 K/UL (ref 3.9–12.7)
WBC # BLD AUTO: 6.79 K/UL (ref 3.9–12.7)
WBC # FLD: NORMAL /CU MM
Z-SCORE OF LEFT VENTRICULAR DIMENSION IN END DIASTOLE: -0.43
Z-SCORE OF LEFT VENTRICULAR DIMENSION IN END SYSTOLE: 0.11

## 2025-05-26 PROCEDURE — 85060 BLOOD SMEAR INTERPRETATION: CPT | Mod: ,,, | Performed by: PATHOLOGY

## 2025-05-26 PROCEDURE — G0425 INPT/ED TELECONSULT30: HCPCS | Mod: 95,,, | Performed by: STUDENT IN AN ORGANIZED HEALTH CARE EDUCATION/TRAINING PROGRAM

## 2025-05-26 PROCEDURE — 25000003 PHARM REV CODE 250: Performed by: HOSPITALIST

## 2025-05-26 PROCEDURE — 63600175 PHARM REV CODE 636 W HCPCS: Performed by: HOSPITALIST

## 2025-05-26 PROCEDURE — 83880 ASSAY OF NATRIURETIC PEPTIDE: CPT | Performed by: EMERGENCY MEDICINE

## 2025-05-26 PROCEDURE — A4216 STERILE WATER/SALINE, 10 ML: HCPCS | Performed by: PHYSICIAN ASSISTANT

## 2025-05-26 PROCEDURE — 63600175 PHARM REV CODE 636 W HCPCS: Performed by: ORTHOPAEDIC SURGERY

## 2025-05-26 PROCEDURE — 83735 ASSAY OF MAGNESIUM: CPT | Mod: 59 | Performed by: EMERGENCY MEDICINE

## 2025-05-26 PROCEDURE — 85610 PROTHROMBIN TIME: CPT | Performed by: HOSPITALIST

## 2025-05-26 PROCEDURE — 99499 UNLISTED E&M SERVICE: CPT | Mod: ,,, | Performed by: PSYCHIATRY & NEUROLOGY

## 2025-05-26 PROCEDURE — 25000003 PHARM REV CODE 250: Performed by: PHYSICIAN ASSISTANT

## 2025-05-26 PROCEDURE — 96374 THER/PROPH/DIAG INJ IV PUSH: CPT | Mod: HCNC

## 2025-05-26 PROCEDURE — 63600175 PHARM REV CODE 636 W HCPCS: Performed by: EMERGENCY MEDICINE

## 2025-05-26 PROCEDURE — 25000003 PHARM REV CODE 250: Performed by: EMERGENCY MEDICINE

## 2025-05-26 PROCEDURE — 87205 SMEAR GRAM STAIN: CPT | Performed by: ORTHOPAEDIC SURGERY

## 2025-05-26 PROCEDURE — G0378 HOSPITAL OBSERVATION PER HR: HCPCS

## 2025-05-26 PROCEDURE — 25000003 PHARM REV CODE 250: Performed by: NURSE PRACTITIONER

## 2025-05-26 PROCEDURE — 96376 TX/PRO/DX INJ SAME DRUG ADON: CPT | Mod: HCNC

## 2025-05-26 PROCEDURE — 83735 ASSAY OF MAGNESIUM: CPT | Mod: 91 | Performed by: PHYSICIAN ASSISTANT

## 2025-05-26 PROCEDURE — 85730 THROMBOPLASTIN TIME PARTIAL: CPT | Mod: 91 | Performed by: INTERNAL MEDICINE

## 2025-05-26 PROCEDURE — 80053 COMPREHEN METABOLIC PANEL: CPT | Performed by: EMERGENCY MEDICINE

## 2025-05-26 PROCEDURE — 92610 EVALUATE SWALLOWING FUNCTION: CPT

## 2025-05-26 PROCEDURE — 85730 THROMBOPLASTIN TIME PARTIAL: CPT | Mod: 91 | Performed by: HOSPITALIST

## 2025-05-26 PROCEDURE — 89051 BODY FLUID CELL COUNT: CPT | Performed by: ORTHOPAEDIC SURGERY

## 2025-05-26 PROCEDURE — 0S9C3ZX DRAINAGE OF RIGHT KNEE JOINT, PERCUTANEOUS APPROACH, DIAGNOSTIC: ICD-10-PCS | Performed by: ORTHOPAEDIC SURGERY

## 2025-05-26 PROCEDURE — A4216 STERILE WATER/SALINE, 10 ML: HCPCS | Performed by: HOSPITALIST

## 2025-05-26 PROCEDURE — 99285 EMERGENCY DEPT VISIT HI MDM: CPT | Mod: 25,HCNC

## 2025-05-26 PROCEDURE — 84484 ASSAY OF TROPONIN QUANT: CPT | Performed by: EMERGENCY MEDICINE

## 2025-05-26 PROCEDURE — 85025 COMPLETE CBC W/AUTO DIFF WBC: CPT | Performed by: EMERGENCY MEDICINE

## 2025-05-26 PROCEDURE — 87102 FUNGUS ISOLATION CULTURE: CPT | Performed by: ORTHOPAEDIC SURGERY

## 2025-05-26 PROCEDURE — 89060 EXAM SYNOVIAL FLUID CRYSTALS: CPT | Performed by: ORTHOPAEDIC SURGERY

## 2025-05-26 PROCEDURE — 36415 COLL VENOUS BLD VENIPUNCTURE: CPT | Performed by: INTERNAL MEDICINE

## 2025-05-26 PROCEDURE — 85025 COMPLETE CBC W/AUTO DIFF WBC: CPT | Mod: 91 | Performed by: PHYSICIAN ASSISTANT

## 2025-05-26 RX ORDER — LANOLIN ALCOHOL/MO/W.PET/CERES
1 CREAM (GRAM) TOPICAL DAILY
Status: DISCONTINUED | OUTPATIENT
Start: 2025-05-26 | End: 2025-06-04 | Stop reason: HOSPADM

## 2025-05-26 RX ORDER — HEPARIN SODIUM,PORCINE/D5W 25000/250
0-40 INTRAVENOUS SOLUTION INTRAVENOUS CONTINUOUS
Status: DISCONTINUED | OUTPATIENT
Start: 2025-05-26 | End: 2025-05-27

## 2025-05-26 RX ORDER — FUROSEMIDE 10 MG/ML
40 INJECTION INTRAMUSCULAR; INTRAVENOUS EVERY 12 HOURS
Status: DISCONTINUED | OUTPATIENT
Start: 2025-05-26 | End: 2025-05-26

## 2025-05-26 RX ORDER — PRAVASTATIN SODIUM 20 MG/1
40 TABLET ORAL DAILY
Status: DISCONTINUED | OUTPATIENT
Start: 2025-05-26 | End: 2025-06-04 | Stop reason: HOSPADM

## 2025-05-26 RX ORDER — GLUCAGON 1 MG
1 KIT INJECTION
Status: DISCONTINUED | OUTPATIENT
Start: 2025-05-26 | End: 2025-06-04 | Stop reason: HOSPADM

## 2025-05-26 RX ORDER — IBUPROFEN 200 MG
24 TABLET ORAL
Status: DISCONTINUED | OUTPATIENT
Start: 2025-05-26 | End: 2025-06-04 | Stop reason: HOSPADM

## 2025-05-26 RX ORDER — MORPHINE SULFATE 2 MG/ML
2 INJECTION, SOLUTION INTRAMUSCULAR; INTRAVENOUS
Refills: 0 | Status: COMPLETED | OUTPATIENT
Start: 2025-05-26 | End: 2025-05-26

## 2025-05-26 RX ORDER — ALLOPURINOL 300 MG/1
300 TABLET ORAL DAILY
Status: DISCONTINUED | OUTPATIENT
Start: 2025-05-26 | End: 2025-05-27

## 2025-05-26 RX ORDER — SODIUM BICARBONATE 650 MG/1
650 TABLET ORAL 2 TIMES DAILY
Status: DISCONTINUED | OUTPATIENT
Start: 2025-05-26 | End: 2025-05-27

## 2025-05-26 RX ORDER — OXYCODONE HYDROCHLORIDE 10 MG/1
10 TABLET ORAL ONCE
Refills: 0 | Status: COMPLETED | OUTPATIENT
Start: 2025-05-26 | End: 2025-05-26

## 2025-05-26 RX ORDER — DEXAMETHASONE SODIUM PHOSPHATE 10 MG/ML
10 INJECTION, SOLUTION INTRA-ARTICULAR; INTRALESIONAL; INTRAMUSCULAR; INTRAVENOUS; SOFT TISSUE
Status: COMPLETED | OUTPATIENT
Start: 2025-05-26 | End: 2025-05-26

## 2025-05-26 RX ORDER — TALC
6 POWDER (GRAM) TOPICAL NIGHTLY PRN
Status: DISCONTINUED | OUTPATIENT
Start: 2025-05-26 | End: 2025-05-26

## 2025-05-26 RX ORDER — PANTOPRAZOLE SODIUM 40 MG/1
40 TABLET, DELAYED RELEASE ORAL DAILY
Status: DISCONTINUED | OUTPATIENT
Start: 2025-05-26 | End: 2025-06-04 | Stop reason: HOSPADM

## 2025-05-26 RX ORDER — ASPIRIN 81 MG/1
81 TABLET ORAL DAILY
Status: DISCONTINUED | OUTPATIENT
Start: 2025-05-26 | End: 2025-05-28

## 2025-05-26 RX ORDER — NALOXONE HCL 0.4 MG/ML
0.02 VIAL (ML) INJECTION
Status: DISCONTINUED | OUTPATIENT
Start: 2025-05-26 | End: 2025-06-04 | Stop reason: HOSPADM

## 2025-05-26 RX ORDER — MORPHINE SULFATE 2 MG/ML
2 INJECTION, SOLUTION INTRAMUSCULAR; INTRAVENOUS EVERY 6 HOURS PRN
Status: DISCONTINUED | OUTPATIENT
Start: 2025-05-26 | End: 2025-06-04 | Stop reason: HOSPADM

## 2025-05-26 RX ORDER — LIDOCAINE 50 MG/G
1 PATCH TOPICAL
Status: DISCONTINUED | OUTPATIENT
Start: 2025-05-26 | End: 2025-06-04 | Stop reason: HOSPADM

## 2025-05-26 RX ORDER — ACETAMINOPHEN 325 MG/1
650 TABLET ORAL EVERY 6 HOURS PRN
Status: DISCONTINUED | OUTPATIENT
Start: 2025-05-26 | End: 2025-06-04 | Stop reason: HOSPADM

## 2025-05-26 RX ORDER — ACETAMINOPHEN 325 MG/1
650 TABLET ORAL EVERY 6 HOURS PRN
Status: DISCONTINUED | OUTPATIENT
Start: 2025-05-26 | End: 2025-05-26

## 2025-05-26 RX ORDER — OXYCODONE HYDROCHLORIDE 5 MG/1
5 TABLET ORAL EVERY 6 HOURS PRN
Refills: 0 | Status: DISCONTINUED | OUTPATIENT
Start: 2025-05-26 | End: 2025-06-04 | Stop reason: HOSPADM

## 2025-05-26 RX ORDER — TALC
6 POWDER (GRAM) TOPICAL NIGHTLY PRN
Status: DISCONTINUED | OUTPATIENT
Start: 2025-05-26 | End: 2025-06-04 | Stop reason: HOSPADM

## 2025-05-26 RX ORDER — GABAPENTIN 100 MG/1
200 CAPSULE ORAL NIGHTLY
Status: DISCONTINUED | OUTPATIENT
Start: 2025-05-26 | End: 2025-06-04 | Stop reason: HOSPADM

## 2025-05-26 RX ORDER — INSULIN ASPART 100 [IU]/ML
0-10 INJECTION, SOLUTION INTRAVENOUS; SUBCUTANEOUS
Status: DISCONTINUED | OUTPATIENT
Start: 2025-05-26 | End: 2025-06-04 | Stop reason: HOSPADM

## 2025-05-26 RX ORDER — SODIUM CHLORIDE 0.9 % (FLUSH) 0.9 %
10 SYRINGE (ML) INJECTION
Status: DISCONTINUED | OUTPATIENT
Start: 2025-05-26 | End: 2025-06-04 | Stop reason: HOSPADM

## 2025-05-26 RX ORDER — METHOCARBAMOL 500 MG/1
500 TABLET, FILM COATED ORAL
Status: COMPLETED | OUTPATIENT
Start: 2025-05-26 | End: 2025-05-26

## 2025-05-26 RX ORDER — NALOXONE HCL 0.4 MG/ML
0.02 VIAL (ML) INJECTION
Status: DISCONTINUED | OUTPATIENT
Start: 2025-05-26 | End: 2025-05-26

## 2025-05-26 RX ORDER — MIDODRINE HYDROCHLORIDE 5 MG/1
10 TABLET ORAL
Status: DISCONTINUED | OUTPATIENT
Start: 2025-05-26 | End: 2025-05-30

## 2025-05-26 RX ORDER — LOPERAMIDE HYDROCHLORIDE 2 MG/1
2 CAPSULE ORAL 4 TIMES DAILY PRN
Status: DISCONTINUED | OUTPATIENT
Start: 2025-05-26 | End: 2025-06-04 | Stop reason: HOSPADM

## 2025-05-26 RX ORDER — IBUPROFEN 200 MG
16 TABLET ORAL
Status: DISCONTINUED | OUTPATIENT
Start: 2025-05-26 | End: 2025-06-04 | Stop reason: HOSPADM

## 2025-05-26 RX ORDER — ONDANSETRON HYDROCHLORIDE 2 MG/ML
4 INJECTION, SOLUTION INTRAVENOUS EVERY 4 HOURS PRN
Status: DISCONTINUED | OUTPATIENT
Start: 2025-05-26 | End: 2025-06-04 | Stop reason: HOSPADM

## 2025-05-26 RX ORDER — BRIMONIDINE TARTRATE 1.5 MG/ML
1 SOLUTION/ DROPS OPHTHALMIC 2 TIMES DAILY
Status: DISCONTINUED | OUTPATIENT
Start: 2025-05-26 | End: 2025-06-04 | Stop reason: HOSPADM

## 2025-05-26 RX ORDER — SODIUM CHLORIDE 0.9 % (FLUSH) 0.9 %
10 SYRINGE (ML) INJECTION EVERY 8 HOURS
Status: DISCONTINUED | OUTPATIENT
Start: 2025-05-26 | End: 2025-06-04 | Stop reason: HOSPADM

## 2025-05-26 RX ORDER — AMIODARONE HYDROCHLORIDE 100 MG/1
200 TABLET ORAL DAILY
Status: DISCONTINUED | OUTPATIENT
Start: 2025-05-26 | End: 2025-06-04 | Stop reason: HOSPADM

## 2025-05-26 RX ORDER — LEVOTHYROXINE SODIUM 25 UG/1
25 TABLET ORAL
Status: DISCONTINUED | OUTPATIENT
Start: 2025-05-26 | End: 2025-06-04 | Stop reason: HOSPADM

## 2025-05-26 RX ORDER — TAMSULOSIN HYDROCHLORIDE 0.4 MG/1
0.4 CAPSULE ORAL DAILY
Status: DISCONTINUED | OUTPATIENT
Start: 2025-05-26 | End: 2025-06-04 | Stop reason: HOSPADM

## 2025-05-26 RX ADMIN — TAMSULOSIN HYDROCHLORIDE 0.4 MG: 0.4 CAPSULE ORAL at 08:05

## 2025-05-26 RX ADMIN — MIDODRINE HYDROCHLORIDE 10 MG: 5 TABLET ORAL at 04:05

## 2025-05-26 RX ADMIN — FERROUS SULFATE TAB 325 MG (65 MG ELEMENTAL FE) 1 EACH: 325 (65 FE) TAB at 08:05

## 2025-05-26 RX ADMIN — MORPHINE SULFATE 2 MG: 2 INJECTION, SOLUTION INTRAMUSCULAR; INTRAVENOUS at 05:05

## 2025-05-26 RX ADMIN — MIDODRINE HYDROCHLORIDE 10 MG: 5 TABLET ORAL at 09:05

## 2025-05-26 RX ADMIN — APIXABAN 5 MG: 2.5 TABLET, FILM COATED ORAL at 08:05

## 2025-05-26 RX ADMIN — OXYCODONE HYDROCHLORIDE 5 MG: 5 TABLET ORAL at 01:05

## 2025-05-26 RX ADMIN — AMIODARONE HYDROCHLORIDE 200 MG: 200 TABLET ORAL at 08:05

## 2025-05-26 RX ADMIN — BRIMONIDINE TARTRATE 1 DROP: 1.5 SOLUTION/ DROPS OPHTHALMIC at 08:05

## 2025-05-26 RX ADMIN — PANTOPRAZOLE SODIUM 40 MG: 40 TABLET, DELAYED RELEASE ORAL at 08:05

## 2025-05-26 RX ADMIN — Medication 10 ML: at 09:05

## 2025-05-26 RX ADMIN — DEXAMETHASONE SODIUM PHOSPHATE 10 MG: 10 INJECTION INTRAMUSCULAR; INTRAVENOUS at 08:05

## 2025-05-26 RX ADMIN — HEPARIN SODIUM 12 UNITS/KG/HR: 10000 INJECTION, SOLUTION INTRAVENOUS at 02:05

## 2025-05-26 RX ADMIN — LEVOTHYROXINE SODIUM 25 MCG: 25 TABLET ORAL at 08:05

## 2025-05-26 RX ADMIN — Medication 10 ML: at 06:05

## 2025-05-26 RX ADMIN — GABAPENTIN 200 MG: 100 CAPSULE ORAL at 08:05

## 2025-05-26 RX ADMIN — ALLOPURINOL 300 MG: 300 TABLET ORAL at 08:05

## 2025-05-26 RX ADMIN — MORPHINE SULFATE 2 MG: 2 INJECTION, SOLUTION INTRAMUSCULAR; INTRAVENOUS at 08:05

## 2025-05-26 RX ADMIN — SODIUM BICARBONATE 650 MG: 650 TABLET ORAL at 03:05

## 2025-05-26 RX ADMIN — MORPHINE SULFATE 2 MG: 2 INJECTION, SOLUTION INTRAMUSCULAR; INTRAVENOUS at 03:05

## 2025-05-26 RX ADMIN — Medication 10 ML: at 02:05

## 2025-05-26 RX ADMIN — ASPIRIN 81 MG: 81 TABLET, COATED ORAL at 08:05

## 2025-05-26 RX ADMIN — SODIUM BICARBONATE 650 MG: 650 TABLET ORAL at 08:05

## 2025-05-26 RX ADMIN — LIDOCAINE 1 PATCH: 50 PATCH CUTANEOUS at 08:05

## 2025-05-26 RX ADMIN — METHOCARBAMOL 500 MG: 500 TABLET ORAL at 03:05

## 2025-05-26 RX ADMIN — MORPHINE SULFATE 2 MG: 2 INJECTION, SOLUTION INTRAMUSCULAR; INTRAVENOUS at 02:05

## 2025-05-26 RX ADMIN — MIDODRINE HYDROCHLORIDE 10 MG: 5 TABLET ORAL at 05:05

## 2025-05-26 RX ADMIN — OXYCODONE HYDROCHLORIDE 5 MG: 5 TABLET ORAL at 10:05

## 2025-05-26 RX ADMIN — OXYCODONE HYDROCHLORIDE 10 MG: 10 TABLET ORAL at 06:05

## 2025-05-26 RX ADMIN — PRAVASTATIN SODIUM 40 MG: 20 TABLET ORAL at 08:05

## 2025-05-26 RX ADMIN — METOPROLOL SUCCINATE 12.5 MG: 25 TABLET, EXTENDED RELEASE ORAL at 08:05

## 2025-05-26 NOTE — TELEMEDICINE CONSULT
"[facility]   General Neurology  Consult Note      Consult Information  Inpatient Consult to Neurology Services (General Neurology)  Consult performed by: Juana Vega MD  Consult ordered by: Riya Damon MD          Consulting Provider: RIYA DAMON   Current Providers  No providers found    Patient Location:  [facility] EMERGENCY DEPARTMENT Emergency Department    Spoke hospital nurse at bedside with patient assisting consultant.  Patient information was obtained from patient and relative(s).       Neurology Documentation  Acute Stroke Times   Stroke Team Called Date: 05/26/25  Stroke Team Called Time: 1310  Stroke Team Arrival Date: 05/26/25  Stroke Team Arrival Time: 1345    Blood pressure 137/65, pulse 61, temperature 98.1 °F (36.7 °C), resp. rate 16, height 6' 2" (1.88 m), weight 76.2 kg (168 lb), SpO2 99%.    Medical Decision Making  HPI:  75 y.o. male with medical history of DVT on AC, HLD, right rotator cuff injury, SCC, prostrate CA, UC, DVT, CDDD, LDDD with admission concerns of falls / with MRI revealing acute stroke.     History from patient / family / medical record review. Reports of symptoms of gait instability - unable to hold balance over the last few months. Slow in ambulation, with short stride length /- however denied any wide based / high stepping pattern or associated gait freezing. Associated mulitple falls. Denied any clear triggers - undergoing cardiology triggers or orthostatic symptomatology. No syncope or presyncope events. No vertigo or inner ear pathologies. Denied any active peripheral neuropathy - sensory deficits. No history of stroke or seizures. Denied any focal or asymmetric weakness. No report of weakness in thighs / hip flexion. Positive for cervicalgia and lumbar radicular symptoms. No resting tremors or signs of parkinsonism. Denied any spasticity in extremities. Urinary urgency reported, however denied any incontinence. Denied any background of progressive " dementia - intact visual spatial skills / memory and executive functioning. Reported of background of arthritis - joint stiffness in LE/UE - however no comprehensive evaluation in the past.      Exam: Awake, alert, following commands, no focal motor or sensory or cranial nerve deficits / significant limitations in joint mobility related to pain in LE.     CT cervical spine: Severe degenerative changes as described producing severe foraminal and central canal stenosis from C2 through C5 and severe left foraminal stenosis at C5-6.     MRI brain: Punctate focus diffusion restriction about the left frontal subcortical white matter, in keeping with a small acute infarct. No significant surrounding edema or mass effect. No evidence for acute intracranial hemorrhage.      Images personally reviewed and interpreted:  Study: MRI Brain and MRA Brain & Neck  Study Interpretation:  As above / No correlating JULES    Additional studies reviewed:   Study: Cardiac_Neuro: EEG  Study Interpretation: N/A    Laboratory studies reviewed:  BMP:   Lab Results   Component Value Date     05/26/2025     05/12/2025    K 4.2 05/26/2025    K 6.3 (HH) 05/12/2025     05/26/2025     (H) 05/12/2025    CO2 16 (L) 05/26/2025    CO2 19 (L) 05/12/2025    BUN 90 (H) 05/26/2025    CREATININE 7.8 (H) 05/26/2025    CALCIUM 7.8 (L) 05/26/2025    CALCIUM 7.9 (L) 05/12/2025     CBC:   Lab Results   Component Value Date    WBC 6.79 05/26/2025    RBC 2.61 (L) 05/26/2025    RBC 2.32 (L) 05/12/2025    HGB 8.1 (L) 05/26/2025    HGB 6.9 (L) 05/12/2025    HCT 26.0 (L) 05/26/2025    HCT 23 (L) 05/16/2025     (L) 05/26/2025     05/12/2025     (H) 05/26/2025    MCV 99.1 05/12/2025    MCH 31.0 05/26/2025    MCHC 31.2 (L) 05/26/2025    MCHC 30.0 (L) 05/12/2025     Lipid Panel:   Lab Results   Component Value Date    CHOL 113 (L) 05/07/2025    CHOL 122 08/04/2024    LDLCALC 63.0 05/07/2025    HDL 41 05/07/2025    TRIG 45  05/07/2025    TRIG 130 08/04/2024     Coagulation:   Lab Results   Component Value Date    INR 1.1 03/01/2025    APTT 38.4 (H) 02/09/2025     Hgb A1C:   Lab Results   Component Value Date    HGBA1C 5.7 (H) 05/07/2025    HGBA1C 6.6 (H) 02/25/2025     TSH:   Lab Results   Component Value Date    TSH 5.258 (H) 05/07/2025    TSH 2.763 09/01/2024       Documentation personally reviewed:  Notes: Notes: H&P     Assessment and plan:  75 y.o. male with medical history of DVT on AC, HLD, right rotator cuff injury, SCC, prostrate CA, UC, DVT, CDDD, LDDD, CKD with admission concerns of falls / with MRI revealing acute stroke.     History from patient / family / medical record review. Reports of symptoms of gait instability - unable to hold balance over the last few months. Slow in ambulation, with short stride length /- however denied any wide based / high stepping pattern or associated gait freezing. Associated mulitple falls. Denied any clear triggers - undergoing cardiology triggers or orthostatic symptomatology. No syncope or presyncope events. No vertigo or inner ear pathologies. Denied any active peripheral neuropathy - sensory deficits. No history of stroke or seizures. Denied any focal or asymmetric weakness. No report of weakness in thighs / hip flexion. Positive for cervicalgia and lumbar radicular symptoms. No resting tremors or signs of parkinsonism. Denied any spasticity in extremities. Urinary urgency reported, however denied any incontinence. Denied any background of progressive dementia - intact visual spatial skills / memory and executive functioning. Reported of background of arthritis - joint stiffness in LE/UE - however no comprehensive evaluation in the past.      Exam: Awake, alert, following commands, no focal motor or sensory or cranial nerve deficits / significant limitations in joint mobility related to pain in LE.     CT cervical spine: Severe degenerative changes as described producing severe  foraminal and central canal stenosis from C2 through C5 and severe left foraminal stenosis at C5-6.     MRI brain: Punctate focus diffusion restriction about the left frontal subcortical white matter, in keeping with a small acute infarct. No significant surrounding edema or mass effect. No evidence for acute intracranial hemorrhage.      Recs:   Gait instability / falls - suspect multifactorial - cervical spinal stenosis and compressive lumbar neuropathy with antalgic confounders - related to orthopaedic / LE arthritis and any higher level gait disorder related to chronic microvascular ischemia. Negative findings for any sensory ataxia / cerebellar ataxia / higher level gait disorder related to parkinsonism gait / neuropathic or myopathic gait or spastic gait. No obvious confounders of subclinical NPH.     Suspect MRI brain findings / incidental wo correlating symptomatology.     Supportive care overall   - PT/OT gait assistance and fall precautions   - Consider NSGY review / however low considerations for any surgical correction given the co morbidities   - Consider Ortho evaluation for assistance related to arthritis co morbidities     Need no active interventions from stroke standpoint   - Continue AC / need no add on ASA from stroke standpoint - when appropriate on completion of AC can consider ASA 81  - target LDL < 70 / Continue atorvastatin  - euglycemic goals   - need no permissive HTN / long term < 130/80  - Echo f/u    - PT/OT recs - discharge planning   - F/u PCP for risk factor modification monitoring  -  on DASH diet; exercise and lifestyle modifications when appropriate   - Provide stroke counseling during the visit - Identification of signs; emergency action and ER attention; Risk factor control, optimization for secondary stroke prevention; Compliance to medications     - F/u primary neurology     Post charge discharge plan:  Clinic follow up: General Neurology    Visit Type: in-person  only  Timeframe: 6 weeks        Additional Physical Exam, History, & ROS    Past Medical History:   Diagnosis Date    Autonomic dysfunction 11/2023 11/2023 neuro note    Basal cell carcinoma     BPH (benign prostatic hyperplasia)     s/p TURP    CAD (coronary artery disease) 2017    status post PCI to mid LAD in 2017 (LHC in 2021 showed patent stent, otherwise nonobstructive CAD)    Cancer of prostate     s/p TURP    Chronic kidney disease     DDD (degenerative disc disease) 10/21/2013    Diabetes mellitus with renal complications     Disc disease, degenerative, cervical     DVT (deep venous thrombosis)     Encounter for blood transfusion     GERD (gastroesophageal reflux disease)     History of ulcerative colitis 1982    s/p colectomy and ileostomy    HLD (hyperlipidemia)     Ileostomy in place 1982    RBBB     Squamous cell carcinoma 03/08/2018    Left superior helix near insertion    Squamous cell carcinoma 04/12/2018    Left forearm x 5    Ventricular tachycardia      Past Surgical History:   Procedure Laterality Date    ABLATION, SVT, ACCESSORY PATHWAY N/A 04/30/2024    Procedure: Ablation, SVT, Accessory Pathway;  Surgeon: Franky Taylor MD;  Location: [facility] EP LAB;  Service: Cardiology;  Laterality: N/A;  VT, RFA, Carto, MAC, GP, 3 Prep *MDT ILR in situ*    cardiac stents      CATARACT EXTRACTION Bilateral     CERVICAL FUSION      CHOLECYSTECTOMY N/A 02/14/2023    Procedure: CHOLECYSTECTOMY;  Surgeon: Mike Joyce MD;  Location: [facility] OR;  Service: General;  Laterality: N/A;  very high probabilty of converison to open    colectomy and ileostomy  1985    EGD, WITH CLOSED BIOPSY  04/04/2025    ENDOSCOPIC ULTRASOUND OF UPPER GASTROINTESTINAL TRACT N/A 02/10/2023    Procedure: ULTRASOUND, UPPER GI TRACT, ENDOSCOPIC;  Surgeon: Poli Fuller MD;  Location: [facility] ENDO;  Service: Endoscopy;  Laterality: N/A;    ERCP N/A 02/10/2023    Procedure: ERCP (ENDOSCOPIC RETROGRADE CHOLANGIOPANCREATOGRAPHY);  Surgeon:  Poli Fuller MD;  Location: [facility] ENDO;  Service: Endoscopy;  Laterality: N/A;    ESOPHAGOGASTRODUODENOSCOPY N/A 4/4/2025    Procedure: EGD (ESOPHAGOGASTRODUODENOSCOPY);  Surgeon: Melania Gibson MD;  [place] [facility] [place]O;  Service: Endoscopy;  Laterality: N/A;    EXCISION OF PAROTID GLAND Left 12/18/2020    Procedure: EXCISION, PAROTID GLAND;  Surgeon: Michael Pinzon MD;  Location: [facility] OR 2ND FLR;  Service: ENT;  Laterality: Left;    INSERTION OF IMPLANTABLE LOOP RECORDER  06/07/2021    INSERTION OF IMPLANTABLE LOOP RECORDER Left 06/07/2021    Procedure: INSERTION, IMPLANTABLE LOOP RECORDER;  Surgeon: Romario Dao MD;  [place]: [facility] [place]note [place] C[place] LAB;  Service: Cardiology;  Laterality: Left;    LEFT HEART CATHETERIZATION Right 04/15/2021    Procedure: CATHETERIZATION, HEART, LEFT;  Surgeon: Marcio Jones MD;  Location: [facility] [place]note 2[place] CATH LAB;  Service: Cardiology;  Laterality: Right;    LYSIS OF ADHESIONS N/A 11/09/2020    Procedure: LYSIS, ADHESIONS,  ERAS low;  Surgeon: CED Haley MD;  Location: [facility] OR 2ND FLR;  Service: Colon and Rectal;  Laterality: N/A;    LYSIS OF ADHESIONS N/A 02/14/2023    Procedure: LYSIS, ADHESIONS;  Surgeon: Mike Joyce MD;  Location: [facility] OR;  Service: General;  Laterality: N/A;    POUCHOSCOPY N/A 04/06/2022    Procedure: ENDOSCOPY, POUCH, SMALL INTESTINE, DIAGNOSTIC;  Surgeon: Dieter Juarez MD;  Location: [facility] (2ND FLR);  Service: Endoscopy;  Laterality: N/A;    REPAIR, HERNIA, PARASTOMAL N/A 11/09/2020    Procedure: REPAIR, HERNIA, PARASTOMAL;  Surgeon: CED Haley MD;  Location: [facility] OR 2ND FLR;  Service: Colon and Rectal;  Laterality: N/A;    TRANSURETHRAL RESECTION OF PROSTATE (TURP) WITHOUT USE OF LASER N/A 01/23/2019    Procedure: TURP, WITHOUT USING LASER BIPOLAR;  Surgeon: Catarino Mota MD;  Location: [facility] OR 1ST FLR;  Service: Urology;  Laterality: N/A;  1.5 HOURS    TREATMENT OF CARDIAC ARRHYTHMIA  04/30/2024    Procedure: Cardioversion or  Defibrillation;  Surgeon: Franky Taylor MD;  Location: [facility] EP LAB;  Service: Cardiology;;     Family History   Problem Relation Name Age of Onset    Dementia Mother      Cancer Father      Diabetes Father      Heart disease Father      Melanoma Neg Hx      Hypertension Neg Hx      Arthritis Neg Hx         Diagnoses  No problems updated.    Juana Vega MD    Neurology consultation requested by spoke provider. Audiovisual encounter with the patient performed using a secure connection.  Results and impressions from the visit are documented on this note and were communicated to the consulting provider/team via direct communication. The note has been shared for addition to the patients electronic medical record.

## 2025-05-26 NOTE — ED PROVIDER NOTES
Encounter Date: 5/26/2025    SCRIBE #1 NOTE: I, Suzi Leblanc, am scribing for, and in the presence of,  Greta Caballero MD. I have scribed the following portions of the note - Other sections scribed: RYAN, JUANY.       History     Chief Complaint   Patient presents with    Shoulder Pain     Patient complaining of right shoulder pain that radiates up right neck that began about 2 hours ago. He is also complaining of left groin pain where he had a blood clot several months ago. He reports groin pain started this evening. Patient complains of full body chills and shakes.     Time seen by physician: 3:38 AM    This is a 75 y.o. male who presents with complaint of pain in his left groin area and right side of his neck. He notes that his neck pain started 2 hours ago. His neck pain radiates from the base of his skull, down his neck, into his R shoulder. He describes the pain as someone hitting him with a baseball bat. He began to have this pain earlier this evening while he was trying to lay down. He had a fall 4 days ago but denies any head injury. He fell on his right hand. He denies back pain.  Patient also has pain in his left groin area. He notes that this pain is similar to the pain he had with a previous blood clot in his left leg. His pain began earlier this evening and has been progressively worsening. He denies testicular pain. His leg swelling is at baseline. He denies chest pain or SOB.  He is compliant with Eliquis.    Patient denies any other complaints at this time.      The history is provided by the patient. No  was used.     Review of patient's allergies indicates:   Allergen Reactions    Atorvastatin     Rosuvastatin      Past Medical History:   Diagnosis Date    Autonomic dysfunction 11/2023 11/2023 neuro note    Basal cell carcinoma     BPH (benign prostatic hyperplasia)     s/p TURP    CAD (coronary artery disease) 2017    status post PCI to mid LAD in 2017 ([facility] in 2021 showed  patent stent, otherwise nonobstructive CAD)    Cancer of prostate     s/p TURP    Chronic kidney disease     DDD (degenerative disc disease) 10/21/2013    Diabetes mellitus with renal complications     Disc disease, degenerative, cervical     DVT (deep venous thrombosis)     Encounter for blood transfusion     GERD (gastroesophageal reflux disease)     History of ulcerative colitis 1982    s/p colectomy and ileostomy    HLD (hyperlipidemia)     Ileostomy in place 1982    RBBB     Squamous cell carcinoma 03/08/2018    Left superior helix near insertion    Squamous cell carcinoma 04/12/2018    Left forearm x 5    Ventricular tachycardia      Past Surgical History:   Procedure Laterality Date    ABLATION, SVT, ACCESSORY PATHWAY N/A 04/30/2024    Procedure: Ablation, SVT, Accessory Pathway;  Surgeon: Franky Taylor MD;  Location: [facility] EP LAB;  Service: Cardiology;  Laterality: N/A;  VT, RFA, Carto, MAC, GP, 3 Prep *MDT ILR in situ*    cardiac stents      CATARACT EXTRACTION Bilateral     CERVICAL FUSION      CHOLECYSTECTOMY N/A 02/14/2023    Procedure: CHOLECYSTECTOMY;  Surgeon: Mike Joyce MD;  [facility] OR;  Service: General;  Laterality: N/A;  very high probabilty of converison to open    colectomy and ileostomy  1985    EGD, WITH CLOSED BIOPSY  04/04/2025    ENDOSCOPIC ULTRASOUND OF UPPER GASTROINTESTINAL TRACT N/A 02/10/2023    Procedure: ULTRASOUND, UPPER GI TRACT, ENDOSCOPIC;  Surgeon: Poli Fuller MD;  Location: [facility];  Service: Endoscopy;  Laterality: N/A;    ERCP N/A 02/10/2023    Procedure: ERCP (ENDOSCOPIC RETROGRADE CHOLANGIOPANCREATOGRAPHY);  Surgeon: Poli Fuller MD;  Location: [facility];  Service: Endoscopy;  Laterality: N/A;    ESOPHAGOGASTRODUODENOSCOPY N/A 4/4/2025    Procedure: EGD (ESOPHAGOGASTRODUODENOSCOPY);  Surgeon: Melania Gibson MD;  Location: [facility];  Service: Endoscopy;  Laterality: N/A;    EXCISION OF PAROTID GLAND Left 12/18/2020    Procedure: EXCISION, PAROTID  GLAND;  Surgeon: Michael Pinzon MD;  Location: [facility] 2ND FLR;  Service: ENT;  Laterality: Left;    INSERTION OF IMPLANTABLE LOOP RECORDER  06/07/2021    INSERTION OF IMPLANTABLE LOOP RECORDER Left 06/07/2021    Procedure: INSERTION, IMPLANTABLE LOOP RECORDER;  Surgeon: Romario Dao MD;  Location: [facility] CATH LAB;  Service: Cardiology;  Laterality: Left;    LEFT HEART CATHETERIZATION Right 04/15/2021    Procedure: CATHETERIZATION, HEART, LEFT;  Surgeon: Marcio Jones MD;  Location: [facility] CATH LAB;  Service: Cardiology;  Laterality: Right;    LYSIS OF ADHESIONS N/A 11/09/2020    Procedure: LYSIS, ADHESIONS,  ERAS low;  Surgeon: CED Haley MD;  Location: [facility] OR 2ND FLR;  Service: Colon and Rectal;  Laterality: N/A;    LYSIS OF ADHESIONS N/A 02/14/2023    Procedure: LYSIS, ADHESIONS;  Surgeon: Mike Joyce MD;  Location: [facility];  Service: General;  Laterality: N/A;    POUCHOSCOPY N/A 04/06/2022    Procedure: ENDOSCOPY, POUCH, SMALL INTESTINE, DIAGNOSTIC;  Surgeon: Dieter Juarez MD;  Location: [facility] ENDO (2ND FLR);  Service: Endoscopy;  Laterality: N/A;    REPAIR, HERNIA, PARASTOMAL N/A 11/09/2020    Procedure: REPAIR, HERNIA, PARASTOMAL;  Surgeon: CED Haley MD;  Location: [facility] OR 2ND FLR;  Service: Colon and Rectal;  Laterality: N/A;    TRANSURETHRAL RESECTION OF PROSTATE (TURP) WITHOUT USE OF LASER N/A 01/23/2019    Procedure: TURP, WITHOUT USING LASER BIPOLAR;  Surgeon: Catarino Mota MD;  Location: [facility] OR 1ST FLR;  Service: Urology;  Laterality: N/A;  1.5 HOURS    TREATMENT OF CARDIAC ARRHYTHMIA  04/30/2024    Procedure: Cardioversion or Defibrillation;  Surgeon: Franky Taylor MD;  Location: [facility] EP LAB;  Service: Cardiology;;     Family History   Problem Relation Name Age of Onset    Dementia Mother      Cancer Father      Diabetes Father      Heart disease Father      Melanoma Neg Hx      Hypertension Neg Hx      Arthritis Neg Hx       Social History[1]  Review of  Systems   Constitutional:  Negative for chills and fever.   HENT:  Negative for congestion and rhinorrhea.    Respiratory:  Negative for chest tightness and shortness of breath.    Cardiovascular:  Positive for leg swelling. Negative for chest pain and palpitations.   Gastrointestinal:  Negative for abdominal pain, diarrhea, nausea and vomiting.   Genitourinary:  Negative for dysuria, flank pain and testicular pain.   Musculoskeletal:  Positive for neck pain. Negative for back pain.   Skin:  Negative for color change and wound.   Neurological:  Positive for headaches. Negative for dizziness.       Physical Exam     Initial Vitals [05/26/25 0311]   BP Pulse Resp Temp SpO2   126/66 (!) 50 18 98.1 °F (36.7 °C) 99 %      MAP       --         Physical Exam    Nursing note and vitals reviewed.  Constitutional: He appears well-developed and well-nourished.   HENT:   Head: Normocephalic and atraumatic.   Eyes: Conjunctivae are normal.   Neck: Neck supple.   Normal range of motion.  Cardiovascular:  Normal rate, regular rhythm and normal heart sounds.           2+ DP/PT pulses bilateral   Pulmonary/Chest: Breath sounds normal. No respiratory distress. He has no wheezes. He has no rales.   Abdominal: Abdomen is soft. Bowel sounds are normal. He exhibits no distension. There is no abdominal tenderness. There is no rebound.   Musculoskeletal:         General: Edema present. No tenderness. Normal range of motion.      Cervical back: Normal range of motion and neck supple.      Comments: 2+ pitting edema to the lower extremities bilaterally    Tenderness along the base of the right occipital region, extending to the right paraspinal cervical region to the superior trapezius muscle.  No overlying erythema or swelling    Tenderness to the proximal medial left thigh with no overlying swelling or erythema     Neurological: He is alert and oriented to person, place, and time.   Skin: Skin is warm and dry. Capillary refill takes less  than 2 seconds.         ED Course   Procedures  Labs Reviewed   COMPREHENSIVE METABOLIC PANEL - Abnormal       Result Value    Sodium 140      Potassium 4.2      Chloride 107      CO2 19 (*)     Glucose 91      BUN 94 (*)     Creatinine 7.8 (*)     Calcium 7.9 (*)     Protein Total 6.2      Albumin 2.9 (*)     Bilirubin Total 0.7      ALP 93      AST 17      ALT 21      Anion Gap 14      eGFR 7 (*)    B-TYPE NATRIURETIC PEPTIDE - Abnormal    BNP 1,253 (*)    CBC WITH DIFFERENTIAL - Abnormal    WBC 6.28      RBC 2.74 (*)     HGB 8.6 (*)     HCT 27.0 (*)     MCV 99 (*)     MCH 31.4 (*)     MCHC 31.9 (*)     RDW 18.5 (*)     Platelet Count 146 (*)     MPV 9.5      Nucleated RBC 0      Neut % 66.4      Lymph % 20.2      Mono % 8.9      Eos % 3.5      Basophil % 0.2      Imm Grans % 0.8 (*)     Neut # 4.17      Lymph # 1.27      Mono # 0.56      Eos # 0.22      Baso # 0.01      Imm Grans # 0.05 (*)    MAGNESIUM - Normal    Magnesium  1.7     TROPONIN I - Normal    Troponin-I 0.026     CBC W/ AUTO DIFFERENTIAL    Narrative:     The following orders were created for panel order CBC auto differential.  Procedure                               Abnormality         Status                     ---------                               -----------         ------                     CBC with Differential[[id number]]       Abnormal            Final result                 Please view results for these tests on the individual orders.   EXTRA TUBES    Narrative:     The following orders were created for panel order EXTRA TUBES.  Procedure                               Abnormality         Status                     ---------                               -----------         ------                     Light Blue Top Hold[[id number]]                             Final result               Light Green Top Hold[[id number]]                            Final result               Light Green Top Hold[[id number]]                            Final  result               Lavender Top Hold[[id number]]                               Final result               Gold Top Hold[[id number]]                                   Final result               Gold Top Hold[[id number]]                                   Final result                 Please view results for these tests on the individual orders.   LIGHT BLUE TOP HOLD    Extra Tube 1     LIGHT GREEN TOP HOLD    Extra Tube 1     LIGHT GREEN TOP HOLD    Extra Tube 1     LAVENDER TOP HOLD    Extra Tube 1     GOLD TOP HOLD    Extra Tube 1     GOLD TOP HOLD    Extra Tube 1     BASIC METABOLIC PANEL   MAGNESIUM   CBC W/ AUTO DIFFERENTIAL    Narrative:     The following orders were created for panel order CBC with Automated Differential.  Procedure                               Abnormality         Status                     ---------                               -----------         ------                     CBC with Differential[[id number]]                                                        Please view results for these tests on the individual orders.   CBC WITH DIFFERENTIAL     EKG Readings: (Independently Interpreted)   4:12AM:  Rate of 62.  Normal sinus rhythm.  Left axis deviation.  Normal intervals.  No ST or ischemic changes.       Imaging Results              X-Ray Chest AP Portable (In process)                      Medications   sodium chloride 0.9% flush 10 mL (has no administration in time range)   melatonin tablet 6 mg (has no administration in time range)   acetaminophen tablet 650 mg (has no administration in time range)   naloxone 0.4 mg/mL injection 0.02 mg (has no administration in time range)   morphine injection 2 mg (2 mg Intravenous Given 5/26/25 0357)   methocarbamoL tablet 500 mg (500 mg Oral Given 5/26/25 0354)   morphine injection 2 mg (2 mg Intravenous Given 5/26/25 0517)     Medical Decision Making  3:38PM:  Patient is a 75-year-old male who presents to the emergency department with left-sided  posterior head and neck pain.  Patient also has left proximal thigh pain.  Patient does have history of DVT and is already currently on a blood thinner.  Will plan for labs, imaging, will continue to follow and reassess.    Amount and/or Complexity of Data Reviewed  External Data Reviewed: notes.  Labs: ordered. Decision-making details documented in ED Course.  Radiology: ordered and independent interpretation performed. Decision-making details documented in ED Course.    Risk  Prescription drug management.     5:15 AM:  Patient doing well, remains stable.  However while on the cardiac monitor, patient did have multiple episodes of, likely AFib RVR.  It was a narrow complex rhythm, though no clear T-waves, possibly AFib with aberrancy with a rate in the 200s.  This would self resolve.  He does also have worsening of his chronic CKD and his BNP is elevated.  Will plan to admit for further observation and management.  He is still awaiting ultrasound at this time though given that he is already on blood thinners, would not change acute management at this time.  I discussed pt with Cherelle Berg PA-C who is agreeable to plan for admission under Dr. Mcnamara.          Scribe Attestation:   Scribe #1: I performed the above scribed service and the documentation accurately describes the services I performed. I attest to the accuracy of the note.              Physician Attestation for Scribe: I, Greta Caballero, reviewed documentation as scribed in my presence, which is both accurate and complete.                   Clinical Impression:  Final diagnoses:  [M79.89] Leg swelling  [R07.9] Chest pain  [I48.0] Paroxysmal atrial fibrillation (Primary)          ED Disposition Condition    Observation              Launch MDCalc MDM  MDCalc MDM Module  May 26 2025 5:53 AM [Greta Caballero]  Data:  - Discussed with external professional: Case discussed with Hospital Medicine. See MDM section and/or ED Course for additional details on the  discussion.  - Independent interpretation: I independently reviewed the XR port Chest AP 1 view. See MDM section and/or ED Course for my interpretation. [Greta Caballero]  - Test/documents/historian: 3+ tests ordered  Problems: Paroxysmal atrial fibrillation  Risk: morphine injection (Parenteral controlled substances), Admitted (Decision regarding hospitalization)             [1]   Social History  Tobacco Use    Smoking status: Never     Passive exposure: Never    Smokeless tobacco: Never   Substance Use Topics    Alcohol use: No    Drug use: No        Greta Caballero MD  05/26/25 0520

## 2025-05-26 NOTE — ASSESSMENT & PLAN NOTE
-Has mild thrombocytopenia which he has had intermittently since at least 2019.  -No evidence of bleeding  -Repeat labs in AM

## 2025-05-26 NOTE — ASSESSMENT & PLAN NOTE
-A1c 5.7  -Home regimen includes glimepiride - holding for now  -Sugars reasonably controlled  -Will treat with SSI ac/hs for now

## 2025-05-26 NOTE — ASSESSMENT & PLAN NOTE
-On admit he denies any sob despite elevated BNP and peripheral edema.  Denies chest pain.  Troponin negative.  -Echo 2/25/25 showed EF 30%, indeterminate diastolic function, mild LA dilation, mild/moderate MR  -Strict ins/outs and daily weights with low sodium fluid restricted diet  -Continue home aspirin, statin, midodrine and metoprolol.  -Per nephrology hold on diuresis for now.  ?HD initiation this hospitalization?  -Monitor on telemetry

## 2025-05-26 NOTE — ASSESSMENT & PLAN NOTE
-Also presented with left groin pain x 24 hours.  Developed a day or so after falling onto his right side.  Exam notable for painful passive flexion of his left hip, but no bruising, redness, warmth or focal tenderness or evidence of any hernia.  -Reports it felt like prior DVT.  Doppler US of left leg without any evidence of DVT presently.  -Suspect musculoskeletal.  Will check xrays of left hip and pelvis  -Fall precautions ordered.  -Consult PT/OT  -Analgesia as above.

## 2025-05-26 NOTE — PT/OT/SLP EVAL
Speech Language Pathology Evaluation  Bedside Swallow    Patient Name:  Jarrett Charlton Jr.   MRN:  [id number]  Admitting Diagnosis: <principal problem not specified>    Recommendations:                 General Recommendations:  SLP to follow up for ongoing assessment of diet tolerance     Diet recommendations: Solids: Minced & Moist Diet - IDDSI Level 5, Regular: Thin     Aspiration Precautions: 1 bite/sip at a time and Standard aspiration precautions , as upright for intake as tolerated     General Precautions: Standard, aspiration    Assessment:     Jarrett Charlton Jr. is a 75 y.o. male with need for ongoing assessment of swallowing, speech, language function s/p CVA.     History:     HPI: Mr. Charlton is a 75 year old man with autonomic dysfunction on midodrine, coronary artery disease, progressing CKDV, BPH prostate cancer s/p TURP, BPH, cervical disk disease (s/p spinal fusion in the 80s), ulcerative colitis (s/p colectomy and illiostomy, ventricular tachycardia / ?atrial fibrillation s/p ablation who presented for evaluation of severe pain in his right shoulder / neck and left groin.  History is obtained by patient and his daughter at bedside.  They note a fall several months ago with abrasion to his lumbar back and shins.  He also had a fall 3 days ago which he states occurred when he was turning and tripped over his shoes.  He didn't hit his head, but did fall onto his right hand/arm.  He had no severe pain at that time.  He states that yesterday he woke up feeling ok, but as the day progressed he developed severe left inner groin pain.  The pain has been progressively worse throughout the day and is exacerbated by walking and laying down.  He also reports developing severe pain in his neck starting at the posterior base of his cranium and radiating down to his right shoulder.  The pain is severe and also worse with laying flat.  He describes the neck/shoulder pain as throbbing and intense.  He reports  baseline dyspnea on exertion which is stable.  He denies fevers, nausea, vomiting, diarrhea, diminished urine output, cough, increased leg swelling, diminished oral intake, palpitations, chest pain and abdominal pain.  In the ED he was treated with methocarbamol, morphine and oxycodone with moderate relief of his pain.       Of note, patient recently treated at [facility] for campylobacter gastroenteritis and enterobacter bacteremia (discharged 4/28).  He was also treated at [facility] for worsening renal failure and hyperkalemia and discharged 5/18 with referral to vascular surgery for AV graft placement.  His daughter reports it is planned to be placed in July.  Daughter also reports planned ERCP/EUS for pancreatic lesion in July and cardiac ablation planned for July 15th.    Past Medical History:   Diagnosis Date    Autonomic dysfunction 11/2023 11/2023 neuro note    Basal cell carcinoma     BPH (benign prostatic hyperplasia)     s/p TURP    CAD (coronary artery disease) 2017    status post PCI to mid LAD in 2017 (LHC in 2021 showed patent stent, otherwise nonobstructive CAD)    Cancer of prostate     s/p TURP    Chronic kidney disease     DDD (degenerative disc disease) 10/21/2013    Diabetes mellitus with renal complications     Disc disease, degenerative, cervical     DVT (deep venous thrombosis)     Encounter for blood transfusion     GERD (gastroesophageal reflux disease)     History of ulcerative colitis 1982    s/p colectomy and ileostomy    HLD (hyperlipidemia)     Ileostomy in place 1982    RBBB     Squamous cell carcinoma 03/08/2018    Left superior helix near insertion    Squamous cell carcinoma 04/12/2018    Left forearm x 5    Ventricular tachycardia        Past Surgical History:   Procedure Laterality Date    ABLATION, SVT, ACCESSORY PATHWAY N/A 04/30/2024    Procedure: Ablation, SVT, Accessory Pathway;  Surgeon: Franky Taylor MD;  Location: [facility] EP LAB;  Service: Cardiology;  Laterality: N/A;  VT, RFA,  [place], MAC, GP, 3 Prep *MDT ILR in situ*    cardiac stents      CATARACT EXTRACTION Bilateral     CERVICAL FUSION      CHOLECYSTECTOMY N/A 02/14/2023    Procedure: CHOLECYSTECTOMY;  Surgeon: Mike Joyce MD;  Location: [facility] OR;  Service: General;  Laterality: N/A;  very high probabilty of converison to open    colectomy and ileostomy  1985    EGD, WITH CLOSED BIOPSY  04/04/2025    ENDOSCOPIC ULTRASOUND OF UPPER GASTROINTESTINAL TRACT N/A 02/10/2023    Procedure: ULTRASOUND, UPPER GI TRACT, ENDOSCOPIC;  Surgeon: Poli Fuller MD;  Location: [facility] ENDO;  Service: Endoscopy;  Laterality: N/A;    ERCP N/A 02/10/2023    Procedure: ERCP (ENDOSCOPIC RETROGRADE CHOLANGIOPANCREATOGRAPHY);  Surgeon: Poli Fuller MD;  Location: [facility];  Service: Endoscopy;  Laterality: N/A;    ESOPHAGOGASTRODUODENOSCOPY N/A 4/4/2025    Procedure: EGD (ESOPHAGOGASTRODUODENOSCOPY);  Surgeon: Melania Gibson MD;  Location: [facility];  Service: Endoscopy;  Laterality: N/A;    EXCISION OF PAROTID GLAND Left 12/18/2020    Procedure: EXCISION, PAROTID GLAND;  Surgeon: Michael Pinzon MD;  [facility];  Service: ENT;  Laterality: Left;    INSERTION OF IMPLANTABLE LOOP RECORDER  06/07/2021    INSERTION OF IMPLANTABLE LOOP RECORDER Left 06/07/2021    Procedure: INSERTION, IMPLANTABLE LOOP RECORDER;  Surgeon: Romario Dao MD;  Location: [facility] CATH LAB;  Service: Cardiology;  Laterality: Left;    LEFT HEART CATHETERIZATION Right 04/15/2021    Procedure: CATHETERIZATION, HEART, LEFT;  Surgeon: Marcio Jones MD;  Location: [facility] CATH LAB;  Service: Cardiology;  Laterality: Right;    LYSIS OF ADHESIONS N/A 11/09/2020    Procedure: LYSIS, ADHESIONS,  ERAS low;  Surgeon: CED Haley MD;  Location: [facility] OR 2ND FLR;  Service: Colon and Rectal;  Laterality: N/A;    LYSIS OF ADHESIONS N/A 02/14/2023    Procedure: LYSIS, ADHESIONS;  Surgeon: Mike Joyce MD;  Location: [facility] OR;  Service: General;  Laterality: N/A;     POUCHOSCOPY N/A 04/06/2022    Procedure: ENDOSCOPY, POUCH, SMALL INTESTINE, DIAGNOSTIC;  Surgeon: Dieter Juarez MD;  Location: [facility] ENDO (2ND FLR);  Service: Endoscopy;  Laterality: N/A;    REPAIR, HERNIA, PARASTOMAL N/A 11/09/2020    Procedure: REPAIR, HERNIA, PARASTOMAL;  Surgeon: CED Haley MD;  Location: [facility] OR 2ND FLR;  Service: Colon and Rectal;  Laterality: N/A;    TRANSURETHRAL RESECTION OF PROSTATE (TURP) WITHOUT USE OF LASER N/A 01/23/2019    Procedure: TURP, WITHOUT USING LASER BIPOLAR;  Surgeon: Catarino Mota MD;  Location: [facility] OR 1ST FLR;  Service: Urology;  Laterality: N/A;  1.5 HOURS    TREATMENT OF CARDIAC ARRHYTHMIA  04/30/2024    Procedure: Cardioversion or Defibrillation;  Surgeon: Franky Taylor MD;  Location: [facility] EP LAB;  Service: Cardiology;;         Subjective     Pt resting in bed. Family member at bedside. SLP in for assessment.     Pain/Comfort:  Pain Rating 1: 10/10  Location - Side 1: Left  Location 1: leg  RN at bedside preparing to administer pain medication    Respiratory Status: Room air    Objective:     Cognitive Communication Status: Pt awake, alert, cooperative. Oriented to person with 100% accuracy. Off on exact date by 2 days, however pt stating he has not looked at a calendar recently. Pt able to participate in multiple turns of conversation, able to provide history over past few days. Pt was requesting to keep assessment brief to allow him to rest, however denies any recent changes in language or cognition.     Oral Musculature Evaluation   Pt unable to participate in oral motor examination. When requesting pt to open his mouth widely he stated the pain in his neck is too bad to participate. Oral musculature appeared to be WFL for speech and intake of liquids. However, pt did state he would like softer solids as it hurts to chew since he cannot lower his jaw.     Bedside Swallow Eval:   Consistencies Assessed:  Thin liquids single sips of water via straw     Pt declined trials of purees and solids     Oral Phase:   Able to siphon liquids via straw  A-p transport of liquids appears to be WFL     Pharyngeal Phase:   Trigger of pharyngeal swallow appears to be WFL for intake of liquids   No overt s/s of airway threat or aspiration during intake of thin liquids     Compensatory Strategies  Discussed sitting as upright as possible and utilizing minced and moist solid diet.       Goals:   Multidisciplinary Problems       SLP Goals          Problem: SLP    Goal Priority Disciplines Outcome   SLP Goal     SLP Progressing   Description: 1. Pt will be able to consume thin liquids and minced and moist solids without overt s/s of airway threat or aspiration.                        Plan:     Patient to be seen:  2 x/week, 4 x/week   Plan of Care expires:  06/02/25  Plan of Care reviewed with:  patient, family   SLP Follow-Up:  Yes       Discharge recommendations:          Time Tracking:     SLP Treatment Date:   05/26/25  Speech Start Time:  1525  Speech Stop Time:  1540     Speech Total Time (min):  15 min    Billable Minutes: Eval Swallow and Oral Function 15 min    05/26/2025

## 2025-05-26 NOTE — ASSESSMENT & PLAN NOTE
-Advance Care Planning Date: 05/26/2025 I engaged patient and his daughter, at bedside, in discussion surrounding his goals of care at the end of life.  We discussed the pros and cons of aggressive treatment in the face of cardiac arrest.  He wishes to continue with aggressive medical care, including dialysis and multiple upcoming procedures.  However, if he were to suffer a cardiac arrest he wishes to be allowed a peaceful death.  I explained that this is consistent with DNR and he was in agreement.  DNR order placed.  I spent 10 minutes ACP time today.

## 2025-05-26 NOTE — ASSESSMENT & PLAN NOTE
-Baseline Cr earlier this year was 1.2-2.7 however in last 2 months has ranged from 4.4-7.1  -Seen by [facility] nephrology recently and referred for AV Graft placement - planned in July per his daughter  -On admit Cr 7.8, BUN 94 and CO2 19 - BNP elevated at 1,253 and he has peripheral edema.  Reports having normal appetite and urine output.  -Avoid nephrotoxic agents and renally dose meds  -Consulted nephrology and input appreciated  -Per nephrology will hold diuretics for today and anticipate needing tunneled dialysis line possibly this week.  -Discussed with nephrology - likely to need dialysis line placement and HD initiation this hospitalization.  Will hold eliquis and treat with heparin drip for now.  -Continue bicarb.  -Repeat labs in AM

## 2025-05-26 NOTE — HPI
"As per Poli Damon MD:    "Mr. Charlton is a 75 year old man with autonomic dysfunction on midodrine, coronary artery disease, progressing CKDV, BPH prostate cancer s/p TURP, BPH, cervical disk disease (s/p spinal fusion in the 80s), ulcerative colitis (s/p colectomy and illiostomy, ventricular tachycardia / ?atrial fibrillation s/p ablation who presented for evaluation of severe pain in his right shoulder / neck and left groin.  History is obtained by patient and his daughter at bedside.  They note a fall several months ago with abrasion to his lumbar back and shins.  He also had a fall 3 days ago which he states occurred when he was turning and tripped over his shoes.  He didn't hit his head, but did fall onto his right hand/arm.  He had no severe pain at that time.  He states that yesterday he woke up feeling ok, but as the day progressed he developed severe left inner groin pain.  The pain has been progressively worse throughout the day and is exacerbated by walking and laying down.  He also reports developing severe pain in his neck starting at the posterior base of his cranium and radiating down to his right shoulder.  The pain is severe and also worse with laying flat.  He describes the neck/shoulder pain as throbbing and intense.  He reports baseline dyspnea on exertion which is stable.  He denies fevers, nausea, vomiting, diarrhea, diminished urine output, cough, increased leg swelling, diminished oral intake, palpitations, chest pain and abdominal pain.  In the ED he was treated with methocarbamol, morphine and oxycodone with moderate relief of his pain.      Of note, patient recently treated at [facility] for campylobacter gastroenteritis and enterobacter bacteremia (discharged 4/28).  He was also [place] [facility] [place] renal failure and hyperkalemia and discharged 5/18 with referral to vascular surgery for AV graft placement.  His daughter reports it is planned to be placed in July.  Daughter also " "reports planned ERCP/EUS for pancreatic lesion in July and cardiac ablation planned for July 15th."  "

## 2025-05-26 NOTE — TELEMEDICINE CONSULT
[facility]  Vascular Neurology  Comprehensive Stroke Center  TeleVascular Neurology Interprofessional Consult Note           Consult Information  Consults    Consulting Provider: RIYA DAMON   Patient Location:  [facility] EMERGENCY DEPARTMENT     Summary of patient's symptoms:  Clinical presentation of neck and shoulder pain.  Found to have punctate area of restricted diffusion on MRI brain, unlikely related to clinical presentation.    However, given hx of falls may represent a related finding.     Images personally reviewed and interpreted:  Study: MRI Brain  Study Interpretation: Punctate acute stroke in L frontal cortex     Assessment and plan:  As discussed w Dr. Damon, no LVO identified, no indication for acute intervention, best served with risk assessment/modification.  Recommend discussion of cervical findings w neurology team for their input.       I spent approximately 11 minutes on this encounter. More than half of that time was spent communicating with the consulting provider and coordinating patient care.       Giovanni Costello MD        This encounter was conducted as an interprofessional communication between providers at the [facility] and vascular neurologist. The interaction was completed over the phone or via secure messaging (electronic medical record - [facility] Secure Chat).     Once this note was completed, a written copy was sent back to the provider via fax or electronic medical record.

## 2025-05-26 NOTE — ASSESSMENT & PLAN NOTE
-Noted at least 2 falls recently and saw Dr. Patel with neurology a few days ago.  His note was reviewed, noted concern for possible stroke and I ordered his suggested imaging workup.    -Suspect falls and debility are multifactorial.  -MRI brain notable for punctate focus diffusion restriction about the left frontal subcortical white matter, in keeping with a small acute infarct. No significant surrounding edema or mass effect. No evidence for acute intracranial hemorrhage.   -MRA brain was normal  -Consulted vascular neurology.  Discussed case with Dr. Chanda Yates and Dr. Costello.  This stroke was felt by vascular neurology to be an incidental finding unrelated to his presentation.  Given this, they declined to see patient and recommended general tele-neuro consultation.  Have placed consult to general neurology for evaluation.  -Check echo with bubble and lipid panel in AM.    -Already on aspirin, eliquis and statin at home.  Discussed with nephrology and anticipate likely needing to initiate HD during this hospitalization.  Will continue aspirin and statin, but hold eliquis.  Will treat with heparin drip for right now in anticipation of possibly needing a dialysis line placed.  -Noting autonomic dysfunction and history of orthostatic hypotension will continue home midodrine and fall precautions

## 2025-05-26 NOTE — SUBJECTIVE & OBJECTIVE
HPI:  75 y.o. male with medical history of DVT on AC, HLD, right rotator cuff injury, SCC, prostrate CA, UC, DVT, CDDD, LDDD with admission concerns of falls / with MRI revealing acute stroke.     History from patient / family / medical record review. Reports of symptoms of gait instability - unable to hold balance over the last few months. Slow in ambulation, with short stride length /- however denied any wide based / high stepping pattern or associated gait freezing. Associated mulitple falls. Denied any clear triggers - undergoing cardiology triggers or orthostatic symptomatology. No syncope or presyncope events. No vertigo or inner ear pathologies. Denied any active peripheral neuropathy - sensory deficits. No history of stroke or seizures. Denied any focal or asymmetric weakness. No report of weakness in thighs / hip flexion. Positive for cervicalgia and lumbar radicular symptoms. No resting tremors or signs of parkinsonism. Denied any spasticity in extremities. Urinary urgency reported, however denied any incontinence. Denied any background of progressive dementia - intact visual spatial skills / memory and executive functioning. Reported of background of arthritis - joint stiffness in LE/UE - however no comprehensive evaluation in the past.      Exam: Awake, alert, following commands, no focal motor or sensory or cranial nerve deficits / significant limitations in joint mobility related to pain in LE.     CT cervical spine: Severe degenerative changes as described producing severe foraminal and central canal stenosis from C2 through C5 and severe left foraminal stenosis at C5-6.     MRI brain: Punctate focus diffusion restriction about the left frontal subcortical white matter, in keeping with a small acute infarct. No significant surrounding edema or mass effect. No evidence for acute intracranial hemorrhage.      Images personally reviewed and interpreted:  Study: MRI Brain and MRA Brain & Neck  Study  Interpretation:  As above / No correlating JULES    Additional studies reviewed:   Study: Cardiac_Neuro: EEG  Study Interpretation: N/A    Laboratory studies reviewed:  BMP:   Lab Results   Component Value Date     05/26/2025     05/12/2025    K 4.2 05/26/2025    K 6.3 (HH) 05/12/2025     05/26/2025     (H) 05/12/2025    CO2 16 (L) 05/26/2025    CO2 19 (L) 05/12/2025    BUN 90 (H) 05/26/2025    CREATININE 7.8 (H) 05/26/2025    CALCIUM 7.8 (L) 05/26/2025    CALCIUM 7.9 (L) 05/12/2025     CBC:   Lab Results   Component Value Date    WBC 6.79 05/26/2025    RBC 2.61 (L) 05/26/2025    RBC 2.32 (L) 05/12/2025    HGB 8.1 (L) 05/26/2025    HGB 6.9 (L) 05/12/2025    HCT 26.0 (L) 05/26/2025    HCT 23 (L) 05/16/2025     (L) 05/26/2025     05/12/2025     (H) 05/26/2025    MCV 99.1 05/12/2025    MCH 31.0 05/26/2025    MCHC 31.2 (L) 05/26/2025    MCHC 30.0 (L) 05/12/2025     Lipid Panel:   Lab Results   Component Value Date    CHOL 113 (L) 05/07/2025    CHOL 122 08/04/2024    LDLCALC 63.0 05/07/2025    HDL 41 05/07/2025    TRIG 45 05/07/2025    TRIG 130 08/04/2024     Coagulation:   Lab Results   Component Value Date    INR 1.1 03/01/2025    APTT 38.4 (H) 02/09/2025     Hgb A1C:   Lab Results   Component Value Date    HGBA1C 5.7 (H) 05/07/2025    HGBA1C 6.6 (H) 02/25/2025     TSH:   Lab Results   Component Value Date    TSH 5.258 (H) 05/07/2025    TSH 2.763 09/01/2024       Documentation personally reviewed:  Notes: Notes: H&P     Assessment and plan:  75 y.o. male with medical history of DVT on AC, HLD, right rotator cuff injury, SCC, prostrate CA, UC, DVT, CDDD, LDDD, CKD with admission concerns of falls / with MRI revealing acute stroke.     History from patient / family / medical record review. Reports of symptoms of gait instability - unable to hold balance over the last few months. Slow in ambulation, with short stride length /- however denied any wide based / high stepping pattern  or associated gait freezing. Associated mulitple falls. Denied any clear triggers - undergoing cardiology triggers or orthostatic symptomatology. No syncope or presyncope events. No vertigo or inner ear pathologies. Denied any active peripheral neuropathy - sensory deficits. No history of stroke or seizures. Denied any focal or asymmetric weakness. No report of weakness in thighs / hip flexion. Positive for cervicalgia and lumbar radicular symptoms. No resting tremors or signs of parkinsonism. Denied any spasticity in extremities. Urinary urgency reported, however denied any incontinence. Denied any background of progressive dementia - intact visual spatial skills / memory and executive functioning. Reported of background of arthritis - joint stiffness in LE/UE - however no comprehensive evaluation in the past.      Exam: Awake, alert, following commands, no focal motor or sensory or cranial nerve deficits / significant limitations in joint mobility related to pain in LE.     CT cervical spine: Severe degenerative changes as described producing severe foraminal and central canal stenosis from C2 through C5 and severe left foraminal stenosis at C5-6.     MRI brain: Punctate focus diffusion restriction about the left frontal subcortical white matter, in keeping with a small acute infarct. No significant surrounding edema or mass effect. No evidence for acute intracranial hemorrhage.      Recs:   Gait instability / falls - suspect multifactorial - cervical spinal stenosis and compressive lumbar neuropathy with antalgic confounders - related to orthopaedic / LE arthritis and any higher level gait disorder related to chronic microvascular ischemia. Negative findings for any sensory ataxia / cerebellar ataxia / higher level gait disorder related to parkinsonism gait / neuropathic or myopathic gait or spastic gait. No obvious confounders of subclinical NPH.     Suspect MRI brain findings / incidental wo correlating  symptomatology.     Supportive care overall   - PT/OT gait assistance and fall precautions   - Consider NSGY review / however low considerations for any surgical correction given the co morbidities   - Consider Ortho evaluation for assistance related to arthritis co morbidities     Need no active interventions from stroke standpoint   - Continue AC / need no add on ASA from stroke standpoint - when appropriate on completion of AC can consider ASA 81  - target LDL < 70 / Continue atorvastatin  - euglycemic goals   - need no permissive HTN / long term < 130/80  - Echo f/u    - PT/OT recs - discharge planning   - F/u PCP for risk factor modification monitoring  -  on DASH diet; exercise and lifestyle modifications when appropriate   - Provide stroke counseling during the visit - Identification of signs; emergency action and ER attention; Risk factor control, optimization for secondary stroke prevention; Compliance to medications     - F/u primary neurology     Post charge discharge plan:  Clinic follow up: General Neurology    Visit Type: in-person only  Timeframe: 6 weeks

## 2025-05-26 NOTE — H&P
[facility] Medicine  History & Physical    Patient Name: Jarrett Charlton Jr.  MRN: [id number]  Patient Class: OP- Observation  Admission Date: 5/26/2025  Attending Physician: Poli Damon MD  Primary Care Provider: Olga Hampton MD         Patient information was obtained from patient, relative(s), past medical records, and ER records.     Subjective:     Principal Problem:<principal problem not specified>    Chief Complaint:   Chief Complaint   Patient presents with    Shoulder Pain     Patient complaining of right shoulder pain that radiates up right neck that began about 2 hours ago. He is also complaining of left groin pain where he had a blood clot several months ago. He reports groin pain started this evening. Patient complains of full body chills and shakes.        HPI: Mr. Charlton is a 75 year old man with autonomic dysfunction on midodrine, coronary artery disease, progressing CKDV, BPH prostate cancer s/p TURP, BPH, cervical disk disease (s/p spinal fusion in the 80s), ulcerative colitis (s/p colectomy and illiostomy, ventricular tachycardia / ?atrial fibrillation s/p ablation who presented for evaluation of severe pain in his right shoulder / neck and left groin.  History is obtained by patient and his daughter at bedside.  They note a fall several months ago with abrasion to his lumbar back and shins.  He also had a fall 3 days ago which he states occurred when he was turning and tripped over his shoes.  He didn't hit his head, but did fall onto his right hand/arm.  He had no severe pain at that time.  He states that yesterday he woke up feeling ok, but as the day progressed he developed severe left inner groin pain.  The pain has been progressively worse throughout the day and is exacerbated by walking and laying down.  He also reports developing severe pain in his neck starting at the posterior base of his cranium and radiating down to his right shoulder.  The pain is  severe and also worse with laying flat.  He describes the neck/shoulder pain as throbbing and intense.  He reports baseline dyspnea on exertion which is stable.  He denies fevers, nausea, vomiting, diarrhea, diminished urine output, cough, increased leg swelling, diminished oral intake, palpitations, chest pain and abdominal pain.  In the ED he was treated with methocarbamol, morphine and oxycodone with moderate relief of his pain.      Of note, patient recently treated at [facility] for campylobacter gastroenteritis and enterobacter bacteremia (discharged 4/28).  He was also treated at [facility] for worsening renal failure and hyperkalemia and discharged 5/18 with referral to vascular surgery for AV graft placement.  His daughter reports it is planned to be placed in July.  Daughter also reports planned ERCP/EUS for pancreatic lesion in July and cardiac ablation planned for July 15th.    Past Medical History:   Diagnosis Date    Autonomic dysfunction 11/2023 11/2023 neuro note    Basal cell carcinoma     BPH (benign prostatic hyperplasia)     s/p TURP    CAD (coronary artery disease) 2017    status post PCI to mid LAD in 2017 (LHC in 2021 showed patent stent, otherwise nonobstructive CAD)    Cancer of prostate     s/p TURP    Chronic kidney disease     DDD (degenerative disc disease) 10/21/2013    Diabetes mellitus with renal complications     Disc disease, degenerative, cervical     DVT (deep venous thrombosis)     Encounter for blood transfusion     GERD (gastroesophageal reflux disease)     History of ulcerative colitis 1982    s/p colectomy and ileostomy    HLD (hyperlipidemia)     Ileostomy in place 1982    RBBB     Squamous cell carcinoma 03/08/2018    Left superior helix near insertion    Squamous cell carcinoma 04/12/2018    Left forearm x 5    Ventricular tachycardia        Past Surgical History:   Procedure Laterality Date    ABLATION, SVT, ACCESSORY PATHWAY N/A 04/30/2024    Procedure: Ablation, SVT, Accessory  Pathway;  Surgeon: Franky Taylor MD;  Location: [facility] EP LAB;  Service: Cardiology;  Laterality: N/A;  VT, RFA, Carto, MAC, GP, 3 Prep *MDT ILR in situ*    cardiac stents      CATARACT EXTRACTION Bilateral     CERVICAL FUSION      CHOLECYSTECTOMY N/A 02/14/2023    Procedure: CHOLECYSTECTOMY;  Surgeon: Mike Joyce MD;  Location: [facility] OR;  Service: General;  Laterality: N/A;  very high probabilty of converison to open    colectomy and ileostomy  1985    EGD, WITH CLOSED BIOPSY  04/04/2025    ENDOSCOPIC ULTRASOUND OF UPPER GASTROINTESTINAL TRACT N/A 02/10/2023    Procedure: ULTRASOUND, UPPER GI TRACT, ENDOSCOPIC;  Surgeon: Poli Fuller MD;  Location: [facility] ENDO;  Service: Endoscopy;  Laterality: N/A;    ERCP N/A 02/10/2023    Procedure: ERCP (ENDOSCOPIC RETROGRADE CHOLANGIOPANCREATOGRAPHY);  Surgeon: Poli Fuller MD;  Location: [facility] ENDO;  Service: Endoscopy;  Laterality: N/A;    ESOPHAGOGASTRODUODENOSCOPY N/A 4/4/2025    Procedure: EGD (ESOPHAGOGASTRODUODENOSCOPY);  Surgeon: Melania Gibson MD;  Location: [facility] ENDO;  Service: Endoscopy;  Laterality: N/A;    EXCISION OF PAROTID GLAND Left 12/18/2020    Procedure: EXCISION, PAROTID GLAND;  Surgeon: Michael Pinzon MD;  [address]  Service: ENT;  Laterality: Left;    INSERTION OF IMPLANTABLE LOOP RECORDER  06/07/2021    INSERTION OF IMPLANTABLE LOOP RECORDER Left 06/07/2021    Procedure: INSERTION, IMPLANTABLE LOOP RECORDER;  Surgeon: Romario Dao MD;  Location: [facility] CATH LAB;  Service: Cardiology;  Laterality: Left;    LEFT HEART CATHETERIZATION Right 04/15/2021    Procedure: CATHETERIZATION, HEART, LEFT;  Surgeon: Marcio Jones MD;  Location: [facility] CATH LAB;  Service: Cardiology;  Laterality: Right;    LYSIS OF ADHESIONS N/A 11/09/2020    Procedure: LYSIS, ADHESIONS,  ERAS low;  Surgeon: CED Haley MD;  Location: [facility] OR 2ND FLR;  Service: Colon and Rectal;  Laterality: N/A;    LYSIS OF ADHESIONS N/A 02/14/2023     Procedure: LYSIS, ADHESIONS;  Surgeon: Mike Joyce MD;  Location: [facility];  Service: General;  Laterality: N/A;    POUCHOSCOPY N/A 04/06/2022    Procedure: ENDOSCOPY, POUCH, SMALL INTESTINE, DIAGNOSTIC;  Surgeon: Dieter Juarez MD;  Location: [facility] ENDO (2ND FLR);  Service: Endoscopy;  Laterality: N/A;    REPAIR, HERNIA, PARASTOMAL N/A 11/09/2020    Procedure: REPAIR, HERNIA, PARASTOMAL;  Surgeon: CED Haley MD;  Location: [facility] OR 2ND FLR;  Service: Colon and Rectal;  Laterality: N/A;    TRANSURETHRAL RESECTION OF PROSTATE (TURP) WITHOUT USE OF LASER N/A 01/23/2019    Procedure: TURP, WITHOUT USING LASER BIPOLAR;  Surgeon: Catarino Mota MD;  Location: [facility] 1ST FLR;  Service: Urology;  Laterality: N/A;  1.5 HOURS    TREATMENT OF CARDIAC ARRHYTHMIA  04/30/2024    Procedure: Cardioversion or Defibrillation;  Surgeon: Franky Taylor MD;  Location: [facility] EP LAB;  Service: Cardiology;;       Review of patient's allergies indicates:   Allergen Reactions    Atorvastatin     Rosuvastatin        Current Facility-Administered Medications on File Prior to Encounter   Medication    leuprolide acetate (6 month) injection 45 mg    leuprolide acetate (6 month) injection 45 mg    sodium chloride 0.9% in 1,000 mL infusion     Current Outpatient Medications on File Prior to Encounter   Medication Sig    ACCU-CHEK SOFTCLIX LANCETS Misc TEST BLOOD SUGAR THREE TIMES DAILY    alcohol swabs (DROPSAFE ALCOHOL PREP PADS) PadM Apply 1 each topically once daily.    allopurinoL (ZYLOPRIM) 100 MG tablet Take 4 tablets (400 mg total) by mouth once daily.    amiodarone (PACERONE) 200 MG Tab Take 1 tablet (200 mg total) by mouth once daily.    apixaban (ELIQUIS) 5 mg Tab Take 1 tablet (5 mg total) by mouth 2 (two) times daily.    aspirin (ECOTRIN) 81 MG EC tablet Take 81 mg by mouth once daily.    blood sugar diagnostic (ACCU-CHEK GUIDE TEST STRIPS) Strp 1 each by Other route 3 (three) times daily. Test Blood Sugar     blood-glucose meter (ACCU-CHEK GUIDE GLUCOSE METER) [facility] Accucheck BID    brimonidine 0.2% (ALPHAGAN) 0.2 % Drop Place 1 drop into both eyes 2 (two) times a day.    [Paused] colchicine (COLCRYS) 0.6 mg tablet Take 1 tablet (0.6 mg total) by mouth every other day.    ferrous sulfate (IRON) 325 mg (65 mg iron) Tab tablet Take 1 tablet (325 mg total) by mouth every other day.    gabapentin (NEURONTIN) 100 MG capsule Take 2 capsules (200 mg total) by mouth every evening.    glimepiride (AMARYL) 4 MG tablet Take 1 tablet (4 mg total) by mouth daily with breakfast.    leuprolide acetate, 6 month, (LUPRON) 45 mg SyKt injection Inject 45 mg into the muscle every 6 (six) months. for 4 doses    levothyroxine (SYNTHROID) 25 MCG tablet Take 1 tablet (25 mcg total) by mouth before breakfast.    magnesium oxide (MAGOX) 400 mg (241.3 mg magnesium) tablet Take 1 tablet (400 mg total) by mouth once daily.    metoprolol succinate (TOPROL-XL) 25 MG 24 hr tablet Take 0.5 tablets (12.5 mg total) by mouth once daily.    midodrine (PROAMATINE) 10 MG tablet Take 1 tablet (10 mg total) by mouth 3 (three) times daily with meals.    pantoprazole (PROTONIX) 40 MG tablet Take 40 mg by mouth once daily.    pravastatin (PRAVACHOL) 40 MG tablet Take 1 tablet (40 mg total) by mouth once daily.    [] predniSONE (DELTASONE) 2.5 MG tablet Take 4 tablets (10 mg total) by mouth once daily for 2 days, THEN 2 tablets (5 mg total) once daily for 2 days, THEN 1 tablet (2.5 mg total) once daily for 2 days.    tamsulosin (FLOMAX) 0.4 mg Cap Take 1 capsule (0.4 mg total) by mouth once daily.    torsemide (DEMADEX) 20 MG Tab Take 1 tablet (20 mg total) by mouth once daily.    vit A/C/E ac/ZnOx/cupric oxide (EYE VITAMIN AND MINERALS ORAL) Take 1 tablet by mouth every morning.     Family History       Problem Relation (Age of Onset)    Cancer Father    Dementia Mother    Diabetes Father    Heart disease Father          Tobacco Use    Smoking status: Never      Passive exposure: Never    Smokeless tobacco: Never   Substance and Sexual Activity    Alcohol use: No    Drug use: No    Sexual activity: Not Currently     Partners: Female     Review of Systems   All other systems reviewed and are negative.    Objective:     Vital Signs (Most Recent):  Temp: 98.1 °F (36.7 °C) (05/26/25 0311)  Pulse: 61 (05/26/25 1300)  Resp: 16 (05/26/25 1302)  BP: 137/65 (05/26/25 1300)  SpO2: 99 % (05/26/25 1300) Vital Signs (24h Range):  Temp:  [98.1 °F (36.7 °C)] 98.1 °F (36.7 °C)  Pulse:  [50-67] 61  Resp:  [15-18] 16  SpO2:  [96 %-99 %] 99 %  BP: (126-147)/(60-68) 137/65     Weight: 76.2 kg (168 lb)  Body mass index is 21.57 kg/m².     Physical Exam  Vitals and nursing note reviewed.   Constitutional:       General: He is in acute distress.      Appearance: He is well-developed. He is ill-appearing. He is not toxic-appearing or diaphoretic.   HENT:      Head: Normocephalic and atraumatic.      Right Ear: External ear normal.      Left Ear: External ear normal.      Nose: Nose normal.      Mouth/Throat:      Mouth: Mucous membranes are moist.   Eyes:      Extraocular Movements: Extraocular movements intact.      Conjunctiva/sclera: Conjunctivae normal.   Cardiovascular:      Rate and Rhythm: Normal rate and regular rhythm.   Pulmonary:      Effort: Pulmonary effort is normal. No respiratory distress.      Breath sounds: Normal breath sounds.   Abdominal:      General: Bowel sounds are normal. There is no distension.      Palpations: Abdomen is soft.      Tenderness: There is no abdominal tenderness.   Musculoskeletal:         General: Normal range of motion.      Cervical back: Normal range of motion. No rigidity.      Right lower leg: Edema present.      Left lower leg: Edema present.      Comments: Tenderness to posterior right base of cranium - no redness, warmth or swelling.  Painful ROM of right shoulder (chronic).  Left groin without swelling, redness or tenderness.  Left hip with  ok PROM adduction / abduction but tender with PROM flexion.  No swelling, bruising, warmth or focal tenderness noted to left hip.   Skin:     General: Skin is warm and dry.   Neurological:      Mental Status: He is alert and oriented to person, place, and time.      Comments: Alert and oriented x 3.  Speech is fluent.  Moves all extremities, but RUE and LLE limited by pain.  Sensation to light touch intact.  Overall quite debilitated and unable to sit himself up independently.   Psychiatric:         Behavior: Behavior normal.                Significant Labs: All pertinent labs within the past 24 hours have been reviewed.    Significant Imaging: I have reviewed all pertinent imaging results/findings within the past 24 hours.  Assessment/Plan:     Assessment & Plan  Neck pain on right side  Cervical spinal stenosis  -Placed in observation status  -Presented with complaints of right neck / shoulder pain and left groin pain.  Noted multiple falls recently as well as history of cervical spinal fusion in the 80s.  -Saw Dr. Patel with neurology several days ago and note reviewed.  Ordered imaging per his recommendations  -MRI C-spine shows severe degenerative changes producing severe foraminal and central canal stenosis from C2 through C5 and severe left foraminal stenosis at C5-6.   -MRI soft tissue neck shows no soft tissue mass or lymphadenopathy throughout the soft tissues of the neck.   -Unclear how acute these findings are, but his pain is certainly relatively acute and far exceeds baseline symptoms.  -Will consult neurology for evaluation - any need for neurosurgery evaluation?  -Provide home gabapentin as well as lidoderm patch, oxycodone prn and if still in pain iv morphine  Left groin pain  History of deep venous thrombosis (DVT) of distal vein of left lower extremity  -Also presented with left groin pain x 24 hours.  Developed a day or so after falling onto his right side.  Exam notable for painful passive  flexion of his left hip, but no bruising, redness, warmth or focal tenderness or evidence of any hernia.  -Reports it felt like prior DVT.  Doppler US of left leg without any evidence of DVT presently.  -Suspect musculoskeletal.  Will check xrays of left hip and pelvis  -Fall precautions ordered.  -Consult PT/OT  -Analgesia as above.  Falls  Debility  Autonomic dysfunction  Acute ischemic stroke  Orthostatic hypotension  -Noted at least 2 falls recently and saw Dr. Patel with neurology a few days ago.  His note was reviewed, noted concern for possible stroke and I ordered his suggested imaging workup.    -Suspect falls and debility are multifactorial.  -MRI brain notable for punctate focus diffusion restriction about the left frontal subcortical white matter, in keeping with a small acute infarct. No significant surrounding edema or mass effect. No evidence for acute intracranial hemorrhage.   -MRA brain was normal  -Consulted vascular neurology.  Discussed case with Dr. Chanda Yates and Dr. Costello.  This stroke was felt by vascular neurology to be an incidental finding unrelated to his presentation.  Given this, they declined to see patient and recommended general tele-neuro consultation.  Have placed consult to general neurology for evaluation.  -Check echo with bubble and lipid panel in AM.    -Already on aspirin, eliquis and statin at home.  Discussed with nephrology and anticipate likely needing to initiate HD during this hospitalization.  Will continue aspirin and statin, but hold eliquis.  Will treat with heparin drip for right now in anticipation of possibly needing a dialysis line placed.  -Noting autonomic dysfunction and history of orthostatic hypotension will continue home midodrine and fall precautions  Acute renal failure with acute tubular necrosis superimposed on stage 5 chronic kidney disease, not on chronic dialysis  -Baseline Cr earlier this year was 1.2-2.7 however in last 2 months has ranged from  4.4-7.1  -Seen by [facility] nephrology recently and referred for AV Graft placement - planned in July per his daughter  -On admit Cr 7.8, BUN 94 and CO2 19 - BNP elevated at 1,253 and he has peripheral edema.  Reports having normal appetite and urine output.  -Avoid nephrotoxic agents and renally dose meds  -Consulted nephrology and input appreciated  -Per nephrology will hold diuretics for today and anticipate needing tunneled dialysis line possibly this week.  -Discussed with nephrology - likely to need dialysis line placement and HD initiation this hospitalization.  Will hold eliquis and treat with heparin drip for now.  -Continue bicarb.  -Repeat labs in AM  Acute on chronic combined systolic and diastolic congestive heart failure  Essential hypertension  CAD (coronary artery disease)  -On admit he denies any sob despite elevated BNP and peripheral edema.  Denies chest pain.  Troponin negative.  -Echo 2/25/25 showed EF 30%, indeterminate diastolic function, mild LA dilation, mild/moderate MR  -Strict ins/outs and daily weights with low sodium fluid restricted diet  -Continue home aspirin, statin, midodrine and metoprolol.  -Per nephrology hold on diuresis for now.  ?HD initiation this hospitalization?  -Monitor on telemetry  Atrial paroxysmal tachycardia  -Noted history - ?pAfib? And plans for ablation at [facility] 7/15/25.  -Continue home amiodarone and metoprolol  -Anticoagulation with heparin drip as above for now.  -Monitor on telemetry  Thrombocytopenia  -Has mild thrombocytopenia which he has had intermittently since at least 2019.  -No evidence of bleeding  -Repeat labs in AM  T2DM (type 2 diabetes mellitus)  -A1c 5.7  -Home regimen includes glimepiride - holding for now  -Sugars reasonably controlled  -Will treat with SSI ac/hs for now  History of ulcerative colitis  High output ileostomy  -Noted history  -Ostomy functioning as it should per family and patient.  Moderate protein-calorie malnutrition  -Nutrition  consulted. Most recent weight and BMI monitored-   -Measurements:  Wt Readings from Last 1 Encounters:   05/26/25 76.2 kg (168 lb)   Body mass index is 21.57 kg/m².  Gout  -Noted history  -Hold prior colchicine.    Hypothyroidism  -Continue home levothyroxine  ACP (advance care planning)  -Advance Care Planning Date: 05/26/2025 I engaged patient and his daughter, at bedside, in discussion surrounding his goals of care at the end of life.  We discussed the pros and cons of aggressive treatment in the face of cardiac arrest.  He wishes to continue with aggressive medical care, including dialysis and multiple upcoming procedures.  However, if he were to suffer a cardiac arrest he wishes to be allowed a peaceful death.  I explained that this is consistent with DNR and he was in agreement.  DNR order placed.  I spent 10 minutes ACP time today.  VTE Risk Mitigation (From admission, onward)           Ordered     apixaban tablet 5 mg  2 times daily         05/26/25 0808     Place sequential compression device  Until discontinued         05/26/25 0808     IP VTE HIGH RISK PATIENT  Once         05/26/25 0808     Place sequential compression device  Until discontinued         05/26/25 0808     Place sequential compression device  Until discontinued         05/26/25 0546                       On 05/26/2025, patient should be placed in hospital observation services under my care.             Poli Damon MD  Department of Hospital Medicine  [facility] Emergency Dept

## 2025-05-26 NOTE — PT/OT/SLP PROGRESS
Occupational Therapy      Patient Name:  Jarrett Charlton Jr.   MRN:  [id number]    Attempted to see pt at 14:20. Upon arrival to room pt undergoing ultrasound at bedside.  Will follow-up tomorrow 5/27.    PARAS Christine  5/26/2025

## 2025-05-26 NOTE — ASSESSMENT & PLAN NOTE
-Placed in observation status  -Presented with complaints of right neck / shoulder pain and left groin pain.  Noted multiple falls recently as well as history of cervical spinal fusion in the 80s.  -Saw Dr. Patel with neurology several days ago and note reviewed.  Ordered imaging per his recommendations  -MRI C-spine shows severe degenerative changes producing severe foraminal and central canal stenosis from C2 through C5 and severe left foraminal stenosis at C5-6.   -MRI soft tissue neck shows no soft tissue mass or lymphadenopathy throughout the soft tissues of the neck.   -Unclear how acute these findings are, but his pain is certainly relatively acute and far exceeds baseline symptoms.  -Will consult neurology for evaluation - any need for neurosurgery evaluation?  -Provide home gabapentin as well as lidoderm patch, oxycodone prn and if still in pain iv morphine

## 2025-05-26 NOTE — ASSESSMENT & PLAN NOTE
-Noted history - ?pAfib? And [place] [facility] [place].  -Continue home amiodarone and metoprolol  -Anticoagulation with heparin drip as above for now.  -Monitor on telemetry

## 2025-05-26 NOTE — CONSULTS
[facility]           [address] [place] [address]    Ortho Spine Surgery Consultation    Consulting Physician: Poli Damon MD    Chief Complaint: Severe Neck pain and leg pain    HPI: Mr. Charlton is a 75 year old man with autonomic dysfunction on midodrine, coronary artery disease, progressing CKDV, BPH prostate cancer s/p TURP, BPH, cervical disk disease (s/p spinal fusion in the 80s), ulcerative colitis (s/p colectomy and illiostomy, ventricular tachycardia / ?atrial fibrillation s/p ablation who presented for evaluation of severe pain in his right shoulder / neck and left groin.  History is obtained by patient and his daughter at bedside.  They note a fall several months ago with abrasion to his lumbar back and shins.  He also had a fall 3 days ago which he states occurred when he was turning and tripped over his shoes.  He didn't hit his head, but did fall onto his right hand/arm.  He had no severe pain at that time.  He states that yesterday he woke up feeling ok, but as the day progressed he developed severe left inner groin pain.  The pain has been progressively worse throughout the day and is exacerbated by walking and laying down.  He also reports developing severe pain in his neck starting at the posterior base of his cranium and radiating down to his right shoulder.  The pain is severe and also worse with laying flat.  He describes the neck/shoulder pain as throbbing and intense.  He reports baseline dyspnea on exertion which is stable.  He denies fevers, nausea, vomiting, diarrhea, diminished urine output, cough, increased leg swelling, diminished oral intake, palpitations, chest pain and abdominal pain.  In the ED he was treated with methocarbamol, morphine and oxycodone with moderate relief of his pain.       Of note, patient recently treated at [facility] for campylobacter gastroenteritis and enterobacter bacteremia (discharged 4/28).  He was also treated at  [facility] for worsening renal failure and hyperkalemia and discharged 5/18 with referral to vascular surgery for AV graft placement.  His daughter reports it is planned to be placed in July.  Daughter also reports planned ERCP/EUS for pancreatic lesion in July and cardiac ablation planned for July 15th.    Ortho consulted for eval severe neck pain and cervical spinal stenosis.  History obtained from patient, patient's chart and daughter over the phone.  In brief, patient has been presenting with unsteady gait for the last 4-5 months and losing hand dexterity with obvious decline in self care. He is still independent for most ADLs but lives with his daughter who sometimes assists him with dressing due to a bad right rotator cuff tear. He has had several fall over the course of 1-2 months. He also has multiple medical co-morbidities as stated in HPI above. He is currently receiving anticoagulation therapy due to DVTs no no obvious cardiac reasons.          Past Medical History:   Diagnosis Date    Autonomic dysfunction 11/2023 11/2023 neuro note    Basal cell carcinoma      BPH (benign prostatic hyperplasia)       s/p TURP    CAD (coronary artery disease) 2017     status post PCI to mid LAD in 2017 (C in 2021 showed patent stent, otherwise nonobstructive CAD)    Cancer of prostate       s/p TURP    Chronic kidney disease      DDD (degenerative disc disease) 10/21/2013    Diabetes mellitus with renal complications      Disc disease, degenerative, cervical      DVT (deep venous thrombosis)      Encounter for blood transfusion      GERD (gastroesophageal reflux disease)      History of ulcerative colitis 1982     s/p colectomy and ileostomy    HLD (hyperlipidemia)      Ileostomy in place 1982    RBBB      Squamous cell carcinoma 03/08/2018     Left superior helix near insertion    Squamous cell carcinoma 04/12/2018     Left forearm x 5    Ventricular tachycardia                 Past Surgical History:   Procedure  Laterality Date    ABLATION, SVT, ACCESSORY PATHWAY N/A 04/30/2024     Procedure: Ablation, SVT, Accessory Pathway;  Surgeon: Franky Taylor MD;  Location: [facility] EP LAB;  Service: Cardiology;  Laterality: N/A;  VT, RFA, Carto, MAC, GP, 3 Prep *MDT ILR in situ*    cardiac stents        CATARACT EXTRACTION Bilateral      CERVICAL FUSION        CHOLECYSTECTOMY N/A 02/14/2023     Procedure: CHOLECYSTECTOMY;  Surgeon: Mike Joyce MD;  Location: [facility];  Service: General;  Laterality: N/A;  very high probabilty of converison to open    colectomy and ileostomy   1985    EGD, WITH CLOSED BIOPSY   04/04/2025    ENDOSCOPIC ULTRASOUND OF UPPER GASTROINTESTINAL TRACT N/A 02/10/2023     Procedure: ULTRASOUND, UPPER GI TRACT, ENDOSCOPIC;  Surgeon: Poli Fuller MD;  Location: [facility] ENDO;  Service: Endoscopy;  Laterality: N/A;    ERCP N/A 02/10/2023     Procedure: ERCP (ENDOSCOPIC RETROGRADE CHOLANGIOPANCREATOGRAPHY);  Surgeon: Poli Fuller MD;  Location: [facility];  Service: Endoscopy;  Laterality: N/A;    ESOPHAGOGASTRODUODENOSCOPY N/A 4/4/2025     Procedure: EGD (ESOPHAGOGASTRODUODENOSCOPY);  Surgeon: Melania Gibson MD;  Location: [facility];  Service: Endoscopy;  Laterality: N/A;    EXCISION OF PAROTID GLAND Left 12/18/2020     Procedure: EXCISION, PAROTID GLAND;  Surgeon: Michael Pinzon MD;  Location: [address];  Service: ENT;  Laterality: Left;    INSERTION OF IMPLANTABLE LOOP RECORDER   06/07/2021    INSERTION OF IMPLANTABLE LOOP RECORDER Left 06/07/2021     Procedure: INSERTION, IMPLANTABLE LOOP RECORDER;  Surgeon: Romario Dao MD;  Location: [facility] CATH LAB;  Service: Cardiology;  Laterality: Left;    LEFT HEART CATHETERIZATION Right 04/15/2021     Procedure: CATHETERIZATION, HEART, LEFT;  Surgeon: Marcio Jones MD;  Location: [facility] CATH LAB;  Service: Cardiology;  Laterality: Right;    LYSIS OF ADHESIONS N/A 11/09/2020     Procedure: LYSIS, ADHESIONS,  ERAS low;  Surgeon: CED Hlaey,  MD;  Location: [facility] OR 2ND FLR;  Service: Colon and Rectal;  Laterality: N/A;    LYSIS OF ADHESIONS N/A 02/14/2023     Procedure: LYSIS, ADHESIONS;  Surgeon: Mike Joyce MD;  Location: [facility];  Service: General;  Laterality: N/A;    POUCHOSCOPY N/A 04/06/2022     Procedure: ENDOSCOPY, POUCH, SMALL INTESTINE, DIAGNOSTIC;  Surgeon: Dieter Juarez MD;  Location: [facility] ENDO (2ND FLR);  Service: Endoscopy;  Laterality: N/A;    REPAIR, HERNIA, PARASTOMAL N/A 11/09/2020     Procedure: REPAIR, HERNIA, PARASTOMAL;  Surgeon: CED Haley MD;  Location: [facility] OR 2ND FLR;  Service: Colon and Rectal;  Laterality: N/A;    TRANSURETHRAL RESECTION OF PROSTATE (TURP) WITHOUT USE OF LASER N/A 01/23/2019     Procedure: TURP, WITHOUT USING LASER BIPOLAR;  Surgeon: Catarino Mota MD;  Location: [facility] 1ST FLR;  Service: Urology;  Laterality: N/A;  1.5 HOURS    TREATMENT OF CARDIAC ARRHYTHMIA   04/30/2024     Procedure: Cardioversion or Defibrillation;  Surgeon: Franky Taylor MD;  Location: [facility] EP LAB;  Service: Cardiology;;              Review of patient's allergies indicates:   Allergen Reactions    Atorvastatin      Rosuvastatin               Current Facility-Administered Medications on File Prior to Encounter   Medication    leuprolide acetate (6 month) injection 45 mg    leuprolide acetate (6 month) injection 45 mg    sodium chloride 0.9% in 1,000 mL infusion           Current Outpatient Medications on File Prior to Encounter   Medication Sig    ACCU-CHEK SOFTCLIX LANCETS Misc TEST BLOOD SUGAR THREE TIMES DAILY    alcohol swabs (DROPSAFE ALCOHOL PREP PADS) PadM Apply 1 each topically once daily.    allopurinoL (ZYLOPRIM) 100 MG tablet Take 4 tablets (400 mg total) by mouth once daily.    amiodarone (PACERONE) 200 MG Tab Take 1 tablet (200 mg total) by mouth once daily.    apixaban (ELIQUIS) 5 mg Tab Take 1 tablet (5 mg total) by mouth 2 (two) times daily.    aspirin (ECOTRIN) 81 MG EC tablet Take 81 mg by mouth  once daily.    blood sugar diagnostic (ACCU-CHEK GUIDE TEST STRIPS) Strp 1 each by Other route 3 (three) times daily. Test Blood Sugar    blood-glucose meter (ACCU-CHEK GUIDE GLUCOSE METER) [facility] Accucheck BID    brimonidine 0.2% (ALPHAGAN) 0.2 % Drop Place 1 drop into both eyes 2 (two) times a day.    [Paused] colchicine (COLCRYS) 0.6 mg tablet Take 1 tablet (0.6 mg total) by mouth every other day.    ferrous sulfate (IRON) 325 mg (65 mg iron) Tab tablet Take 1 tablet (325 mg total) by mouth every other day.    gabapentin (NEURONTIN) 100 MG capsule Take 2 capsules (200 mg total) by mouth every evening.    glimepiride (AMARYL) 4 MG tablet Take 1 tablet (4 mg total) by mouth daily with breakfast.    leuprolide acetate, 6 month, (LUPRON) 45 mg SyKt injection Inject 45 mg into the muscle every 6 (six) months. for 4 doses    levothyroxine (SYNTHROID) 25 MCG tablet Take 1 tablet (25 mcg total) by mouth before breakfast.    magnesium oxide (MAGOX) 400 mg (241.3 mg magnesium) tablet Take 1 tablet (400 mg total) by mouth once daily.    metoprolol succinate (TOPROL-XL) 25 MG 24 hr tablet Take 0.5 tablets (12.5 mg total) by mouth once daily.    midodrine (PROAMATINE) 10 MG tablet Take 1 tablet (10 mg total) by mouth 3 (three) times daily with meals.    pantoprazole (PROTONIX) 40 MG tablet Take 40 mg by mouth once daily.    pravastatin (PRAVACHOL) 40 MG tablet Take 1 tablet (40 mg total) by mouth once daily.    [] predniSONE (DELTASONE) 2.5 MG tablet Take 4 tablets (10 mg total) by mouth once daily for 2 days, THEN 2 tablets (5 mg total) once daily for 2 days, THEN 1 tablet (2.5 mg total) once daily for 2 days.    tamsulosin (FLOMAX) 0.4 mg Cap Take 1 capsule (0.4 mg total) by mouth once daily.    torsemide (DEMADEX) 20 MG Tab Take 1 tablet (20 mg total) by mouth once daily.    vit A/C/E ac/ZnOx/cupric oxide (EYE VITAMIN AND MINERALS ORAL) Take 1 tablet by mouth every morning.      Family History         Problem Relation  (Age of Onset)     Cancer Father     Dementia Mother     Diabetes Father     Heart disease Father                   Tobacco Use    Smoking status: Never       Passive exposure: Never    Smokeless tobacco: Never   Substance and Sexual Activity    Alcohol use: No    Drug use: No    Sexual activity: Not Currently       Partners: Female      Review of Systems   All other systems reviewed and are negative.     Objective:   Physical Exam:  Patient is A+O x3. In some distress 2/2 neck pain. Patient has his neck flexed forward with painful active and passive ROM. He tolerates some flexion. He is able to raise left arm above shoulder level but unable to raise right 2/2 pain and rotator cuff tear. There is tremor in his right UE  He has notable proximal leg weakness and uanble to raise off the bed. HE has a notable hemarthrosis on the right knee with (+)ttp and limited ROM 2/2 pain. He does toleate passive ROM of his left knee. Motor strength notable HF 2-3/5, Quads 4/5 on the right and 3/5 on the left., TA/GS 4+/5 on the right and 1-2/5 on the left. Left EHL 1-2/5. Toes wwp    MRi C-spine: Severe degenerative changes with severe foraminal and central canal stenosis from C2 through C5 mainly on left hemicord C3-4 and C4-5. Central canal measuring 5.5mm at C3-4 and 6mm at C4-5. There is severe left foraminal stenosis at C5-6. He seems to be fused C6-7.    CT C-spine 4/25/25: Multilevel Spondylosis Autofused C6-7. Moderate to severe bony central canal stenosis worse 6.8mm at C3-4 .    DDx:   Cervical SPondylotic myeloraticulopathy  Severe cervical spinal stenosis  Severe Right knee pain 2/2/ Large effusion - Hemarthrosis?    A/P:  76yo M with multiple co-morbidities mainly DM and CKD4 about to be in HD. HE is also anticoagulated secondary to DVTs. He is clearly showing progressive myeloradiculopathy 2/2 severe central canal stenosis C3-5. He would benefit from surgical decompression and Fusion C2-6 sooner rather than  later.Discussed with Dr. Damon and recommended a course of IV steroids Decadron 10mg q8h x 72 hrs with tight glucose controls.  I discussed the possibility of needing surgical intervention if he does not respond to steroids and PT.     I offered a right knee aspiration to relieve pressure and he elected to proceed.     Procedure note:   Right knee was prepped with Betadine, right lateral supero-lateral approach with 18G needle was inserted obtaining 60cc of clear to cloudy synovial fluid and sent for analysis. 4x4s and ace wrap applied. Patient did refer moderate relief after procedure.

## 2025-05-26 NOTE — ED TRIAGE NOTES
Jarrett Charlton Jr., a 75 y.o. male presents to the ED complaining of right shoulder pain that radiates to the right neck and complaining of left groin pain that began tonight.    Patient is A&Ox4. Denies any other complaints. ED workup in progress. Safety measures in place; side rails up x2. Call light within pt reach. Plan of care ongoing.

## 2025-05-26 NOTE — ASSESSMENT & PLAN NOTE
-Nutrition consulted. Most recent weight and BMI monitored-   -Measurements:  Wt Readings from Last 1 Encounters:   05/26/25 76.2 kg (168 lb)   Body mass index is 21.57 kg/m².

## 2025-05-26 NOTE — SUBJECTIVE & OBJECTIVE
Past Medical History:   Diagnosis Date    Autonomic dysfunction 11/2023 11/2023 neuro note    Basal cell carcinoma     BPH (benign prostatic hyperplasia)     s/p TURP    CAD (coronary artery disease) 2017    status post PCI to mid LAD in 2017 (LHC in 2021 showed patent stent, otherwise nonobstructive CAD)    Cancer of prostate     s/p TURP    Chronic kidney disease     DDD (degenerative disc disease) 10/21/2013    Diabetes mellitus with renal complications     Disc disease, degenerative, cervical     DVT (deep venous thrombosis)     Encounter for blood transfusion     GERD (gastroesophageal reflux disease)     History of ulcerative colitis 1982    s/p colectomy and ileostomy    HLD (hyperlipidemia)     Ileostomy in place 1982    RBBB     Squamous cell carcinoma 03/08/2018    Left superior helix near insertion    Squamous cell carcinoma 04/12/2018    Left forearm x 5    Ventricular tachycardia        Past Surgical History:   Procedure Laterality Date    ABLATION, SVT, ACCESSORY PATHWAY N/A 04/30/2024    Procedure: Ablation, SVT, Accessory Pathway;  Surgeon: Franky Taylor MD;  Location: [facility] EP LAB;  Service: Cardiology;  Laterality: N/A;  VT, RFA, Carto, MAC, GP, 3 Prep *MDT ILR in situ*    cardiac stents      CATARACT EXTRACTION Bilateral     CERVICAL FUSION      CHOLECYSTECTOMY N/A 02/14/2023    Procedure: CHOLECYSTECTOMY;  Surgeon: Mike Joyce MD;  Location: [facility] OR;  Service: General;  Laterality: N/A;  very high probabilty of converison to open    colectomy and ileostomy  1985    EGD, WITH CLOSED BIOPSY  04/04/2025    ENDOSCOPIC ULTRASOUND OF UPPER GASTROINTESTINAL TRACT N/A 02/10/2023    Procedure: ULTRASOUND, UPPER GI TRACT, ENDOSCOPIC;  Surgeon: Poli Fuller MD;  Location: [facility] ENDO;  Service: Endoscopy;  Laterality: N/A;    ERCP N/A 02/10/2023    Procedure: ERCP (ENDOSCOPIC RETROGRADE CHOLANGIOPANCREATOGRAPHY);  Surgeon: Poli Fuller MD;  Location: [facility] ENDO;  Service: Endoscopy;  Laterality:  N/A;    ESOPHAGOGASTRODUODENOSCOPY N/A 4/4/2025    Procedure: EGD (ESOPHAGOGASTRODUODENOSCOPY);  Surgeon: Melania Gibson MD;  Location: [facility] ENDO;  Service: Endoscopy;  Laterality: N/A;    EXCISION OF PAROTID GLAND Left 12/18/2020    Procedure: EXCISION, PAROTID GLAND;  Surgeon: Michael Pinzon MD;  Location: [facility] OR 2ND FLR;  Service: ENT;  Laterality: Left;    INSERTION OF IMPLANTABLE LOOP RECORDER  06/07/2021    INSERTION OF IMPLANTABLE LOOP RECORDER Left 06/07/2021    Procedure: INSERTION, IMPLANTABLE LOOP RECORDER;  Surgeon: Romario Dao MD;  Location: [facility] CATH LAB;  Service: Cardiology;  Laterality: Left;    LEFT HEART CATHETERIZATION Right 04/15/2021    Procedure: CATHETERIZATION, HEART, LEFT;  Surgeon: Marcio Jones MD;  Location: [facility] CATH LAB;  Service: Cardiology;  Laterality: Right;    LYSIS OF ADHESIONS N/A 11/09/2020    Procedure: LYSIS, ADHESIONS,  ERAS low;  Surgeon: CED Haley MD;  Location: [facility] OR 2ND FLR;  Service: Colon and Rectal;  Laterality: N/A;    LYSIS OF ADHESIONS N/A 02/14/2023    Procedure: LYSIS, ADHESIONS;  Surgeon: Mike Joyce MD;  Location: [facility];  Service: General;  Laterality: N/A;    POUCHOSCOPY N/A 04/06/2022    Procedure: ENDOSCOPY, POUCH, SMALL INTESTINE, DIAGNOSTIC;  Surgeon: Dieter Juarez MD;  Location: [facility] (2ND FLR);  Service: Endoscopy;  Laterality: N/A;    REPAIR, HERNIA, PARASTOMAL N/A 11/09/2020    Procedure: REPAIR, HERNIA, PARASTOMAL;  Surgeon: CED Haley MD;  Location: [facility] OR 2ND FLR;  Service: Colon and Rectal;  Laterality: N/A;    TRANSURETHRAL RESECTION OF PROSTATE (TURP) WITHOUT USE OF LASER N/A 01/23/2019    Procedure: TURP, WITHOUT USING LASER BIPOLAR;  Surgeon: Catarino Mota MD;  Location: [facility] OR 1ST FLR;  Service: Urology;  Laterality: N/A;  1.5 HOURS    TREATMENT OF CARDIAC ARRHYTHMIA  04/30/2024    Procedure: Cardioversion or Defibrillation;  Surgeon: Franky Taylor MD;  Location: [facility] EP LAB;   Service: Cardiology;;       Review of patient's allergies indicates:   Allergen Reactions    Atorvastatin     Rosuvastatin        Current Facility-Administered Medications on File Prior to Encounter   Medication    leuprolide acetate (6 month) injection 45 mg    leuprolide acetate (6 month) injection 45 mg    sodium chloride 0.9% in 1,000 mL infusion     Current Outpatient Medications on File Prior to Encounter   Medication Sig    ACCU-CHEK SOFTCLIX LANCETS [facility] TEST BLOOD SUGAR THREE TIMES DAILY    alcohol swabs (DROPSAFE ALCOHOL PREP PADS) PadM Apply 1 each topically once daily.    allopurinoL (ZYLOPRIM) 100 MG tablet Take 4 tablets (400 mg total) by mouth once daily.    amiodarone (PACERONE) 200 MG Tab Take 1 tablet (200 mg total) by mouth once daily.    apixaban (ELIQUIS) 5 mg Tab Take 1 tablet (5 mg total) by mouth 2 (two) times daily.    aspirin (ECOTRIN) 81 MG EC tablet Take 81 mg by mouth once daily.    blood sugar diagnostic (ACCU-CHEK GUIDE TEST STRIPS) Strp 1 each by Other route 3 (three) times daily. Test Blood Sugar    blood-glucose meter (ACCU-CHEK GUIDE GLUCOSE METER) [facility] Accucheck BID    brimonidine 0.2% (ALPHAGAN) 0.2 % Drop Place 1 drop into both eyes 2 (two) times a day.    [Paused] colchicine (COLCRYS) 0.6 mg tablet Take 1 tablet (0.6 mg total) by mouth every other day.    ferrous sulfate (IRON) 325 mg (65 mg iron) Tab tablet Take 1 tablet (325 mg total) by mouth every other day.    gabapentin (NEURONTIN) 100 MG capsule Take 2 capsules (200 mg total) by mouth every evening.    glimepiride (AMARYL) 4 MG tablet Take 1 tablet (4 mg total) by mouth daily with breakfast.    leuprolide acetate, 6 month, (LUPRON) 45 mg SyKt injection Inject 45 mg into the muscle every 6 (six) months. for 4 doses    levothyroxine (SYNTHROID) 25 MCG tablet Take 1 tablet (25 mcg total) by mouth before breakfast.    magnesium oxide (MAGOX) 400 mg (241.3 mg magnesium) tablet Take 1 tablet (400 mg total) by mouth once daily.     metoprolol succinate (TOPROL-XL) 25 MG 24 hr tablet Take 0.5 tablets (12.5 mg total) by mouth once daily.    midodrine (PROAMATINE) 10 MG tablet Take 1 tablet (10 mg total) by mouth 3 (three) times daily with meals.    pantoprazole (PROTONIX) 40 MG tablet Take 40 mg by mouth once daily.    pravastatin (PRAVACHOL) 40 MG tablet Take 1 tablet (40 mg total) by mouth once daily.    [] predniSONE (DELTASONE) 2.5 MG tablet Take 4 tablets (10 mg total) by mouth once daily for 2 days, THEN 2 tablets (5 mg total) once daily for 2 days, THEN 1 tablet (2.5 mg total) once daily for 2 days.    tamsulosin (FLOMAX) 0.4 mg Cap Take 1 capsule (0.4 mg total) by mouth once daily.    torsemide (DEMADEX) 20 MG Tab Take 1 tablet (20 mg total) by mouth once daily.    vit A/C/E ac/ZnOx/cupric oxide (EYE VITAMIN AND MINERALS ORAL) Take 1 tablet by mouth every morning.     Family History       Problem Relation (Age of Onset)    Cancer Father    Dementia Mother    Diabetes Father    Heart disease Father          Tobacco Use    Smoking status: Never     Passive exposure: Never    Smokeless tobacco: Never   Substance and Sexual Activity    Alcohol use: No    Drug use: No    Sexual activity: Not Currently     Partners: Female     Review of Systems   All other systems reviewed and are negative.    Objective:     Vital Signs (Most Recent):  Temp: 98.1 °F (36.7 °C) (25 0311)  Pulse: 61 (25 1300)  Resp: 16 (25 1302)  BP: 137/65 (25 1300)  SpO2: 99 % (25 1300) Vital Signs (24h Range):  Temp:  [98.1 °F (36.7 °C)] 98.1 °F (36.7 °C)  Pulse:  [50-67] 61  Resp:  [15-18] 16  SpO2:  [96 %-99 %] 99 %  BP: (126-147)/(60-68) 137/65     Weight: 76.2 kg (168 lb)  Body mass index is 21.57 kg/m².     Physical Exam  Vitals and nursing note reviewed.   Constitutional:       General: He is in acute distress.      Appearance: He is well-developed. He is ill-appearing. He is not toxic-appearing or diaphoretic.   HENT:      Head:  Normocephalic and atraumatic.      Right Ear: External ear normal.      Left Ear: External ear normal.      Nose: Nose normal.      Mouth/Throat:      Mouth: Mucous membranes are moist.   Eyes:      Extraocular Movements: Extraocular movements intact.      Conjunctiva/sclera: Conjunctivae normal.   Cardiovascular:      Rate and Rhythm: Normal rate and regular rhythm.   Pulmonary:      Effort: Pulmonary effort is normal. No respiratory distress.      Breath sounds: Normal breath sounds.   Abdominal:      General: Bowel sounds are normal. There is no distension.      Palpations: Abdomen is soft.      Tenderness: There is no abdominal tenderness.   Musculoskeletal:         General: Normal range of motion.      Cervical back: Normal range of motion. No rigidity.      Right lower leg: Edema present.      Left lower leg: Edema present.      Comments: Tenderness to posterior right base of cranium - no redness, warmth or swelling.  Painful ROM of right shoulder (chronic).  Left groin without swelling, redness or tenderness.  Left hip with ok PROM adduction / abduction but tender with PROM flexion.  No swelling, bruising, warmth or focal tenderness noted to left hip.   Skin:     General: Skin is warm and dry.   Neurological:      Mental Status: He is alert and oriented to person, place, and time.      Comments: Alert and oriented x 3.  Speech is fluent.  Moves all extremities, but RUE and LLE limited by pain.  Sensation to light touch intact.  Overall quite debilitated and unable to sit himself up independently.   Psychiatric:         Behavior: Behavior normal.                Significant Labs: All pertinent labs within the past 24 hours have been reviewed.    Significant Imaging: I have reviewed all pertinent imaging results/findings within the past 24 hours.

## 2025-05-26 NOTE — CONSULTS
Consult Note  Nephrology    Consult Requested By: Poli Damon MD  Reason for Consult: GRADY    SUBJECTIVE:     History of Present Illness:  Patient is a 75 y.o. male presents with neck/shoulder pain.  W/up in process and preliminarily MRI with acute left frontal infarct.  Labs significant for worsening GRADY/CKD (Creat 7.8, BUN 94, BNP 1253).  Pt with extensive med hx as outlined below and known to me from recent admission.  Significant renal disease and followed by [facility].  Has had rapid progression of CKD over past year with creat steadily rising 3-4-5-6-7.  Has been referred to vascular for AVF and transplant.  Seen after multiple testings.  Looks older than stated age,  frail, etc.  C/o pain in neck, head.      EPIC reviewed.  Case discussed with team.        Past Medical History:   Diagnosis Date    Autonomic dysfunction 11/2023 11/2023 neuro note    Basal cell carcinoma     BPH (benign prostatic hyperplasia)     s/p TURP    CAD (coronary artery disease) 2017    status post PCI to mid LAD in 2017 (LHC in 2021 showed patent stent, otherwise nonobstructive CAD)    Cancer of prostate     s/p TURP    Chronic kidney disease     DDD (degenerative disc disease) 10/21/2013    Diabetes mellitus with renal complications     Disc disease, degenerative, cervical     DVT (deep venous thrombosis)     Encounter for blood transfusion     GERD (gastroesophageal reflux disease)     History of ulcerative colitis 1982    s/p colectomy and ileostomy    HLD (hyperlipidemia)     Ileostomy in place 1982    RBBB     Squamous cell carcinoma 03/08/2018    Left superior helix near insertion    Squamous cell carcinoma 04/12/2018    Left forearm x 5    Ventricular tachycardia      Past Surgical History:   Procedure Laterality Date    ABLATION, SVT, ACCESSORY PATHWAY N/A 04/30/2024    Procedure: Ablation, SVT, Accessory Pathway;  Surgeon: Franky Taylor MD;  Location: [facility] EP LAB;  Service: Cardiology;  Laterality: N/A;   VT, RFA, Carto, MAC, GP, 3 Prep *MDT ILR in situ*    cardiac stents      CATARACT EXTRACTION Bilateral     CERVICAL FUSION      CHOLECYSTECTOMY N/A 02/14/2023    Procedure: CHOLECYSTECTOMY;  Surgeon: Mike Joyce MD;  Location: [facility];  Service: General;  Laterality: N/A;  very high probabilty of converison to open    colectomy and ileostomy  1985    EGD, WITH CLOSED BIOPSY  04/04/2025    ENDOSCOPIC ULTRASOUND OF UPPER GASTROINTESTINAL TRACT N/A 02/10/2023    Procedure: ULTRASOUND, UPPER GI TRACT, ENDOSCOPIC;  Surgeon: Poli Fuller MD;  Location: [facility];  Service: Endoscopy;  Laterality: N/A;    ERCP N/A 02/10/2023    Procedure: ERCP (ENDOSCOPIC RETROGRADE CHOLANGIOPANCREATOGRAPHY);  Surgeon: Poli Fuller MD;  Location: [facility];  Service: Endoscopy;  Laterality: N/A;    ESOPHAGOGASTRODUODENOSCOPY N/A 4/4/2025    Procedure: EGD (ESOPHAGOGASTRODUODENOSCOPY);  Surgeon: Melania Gibson MD;  Location: [facility];  Service: Endoscopy;  Laterality: N/A;    EXCISION OF PAROTID GLAND Left 12/18/2020    Procedure: EXCISION, PAROTID GLAND;  Surgeon: Michael Pinzno MD;  [facility];  Service: ENT;  Laterality: Left;    INSERTION OF IMPLANTABLE LOOP RECORDER  06/07/2021    INSERTION OF IMPLANTABLE LOOP RECORDER Left 06/07/2021    Procedure: INSERTION, IMPLANTABLE LOOP RECORDER;  Surgeon: Romario Dao MD;  Location: [facility] CATH LAB;  Service: Cardiology;  Laterality: Left;    LEFT HEART CATHETERIZATION Right 04/15/2021    Procedure: CATHETERIZATION, HEART, LEFT;  Surgeon: Marcio Jones MD;  Location: [facility] CATH LAB;  Service: Cardiology;  Laterality: Right;    LYSIS OF ADHESIONS N/A 11/09/2020    Procedure: LYSIS, ADHESIONS,  ERAS low;  Surgeon: CED Haley MD;  Location: [facility] OR 2ND FLR;  Service: Colon and Rectal;  Laterality: N/A;    LYSIS OF ADHESIONS N/A 02/14/2023    Procedure: LYSIS, ADHESIONS;  Surgeon: Mike Joyce MD;  Location: [facility] OR;  Service: General;  Laterality:  N/A;    POUCHOSCOPY N/A 04/06/2022    Procedure: ENDOSCOPY, POUCH, SMALL INTESTINE, DIAGNOSTIC;  Surgeon: Dieter Juarez MD;  Location: [facility] ENDO (2ND FLR);  Service: Endoscopy;  Laterality: N/A;    REPAIR, HERNIA, PARASTOMAL N/A 11/09/2020    Procedure: REPAIR, HERNIA, PARASTOMAL;  Surgeon: CED Haley MD;  Location: [facility] OR 2ND FLR;  Service: Colon and Rectal;  Laterality: N/A;    TRANSURETHRAL RESECTION OF PROSTATE (TURP) WITHOUT USE OF LASER N/A 01/23/2019    Procedure: TURP, WITHOUT USING LASER BIPOLAR;  Surgeon: Catarino Mota MD;  Location: [facility] OR 1ST FLR;  Service: Urology;  Laterality: N/A;  1.5 HOURS    TREATMENT OF CARDIAC ARRHYTHMIA  04/30/2024    Procedure: Cardioversion or Defibrillation;  Surgeon: Franky Taylor MD;  Location: [facility] EP LAB;  Service: Cardiology;;     Family History   Problem Relation Name Age of Onset    Dementia Mother      Cancer Father      Diabetes Father      Heart disease Father      Melanoma Neg Hx      Hypertension Neg Hx      Arthritis Neg Hx       Social History[1]    Review of patient's allergies indicates:   Allergen Reactions    Atorvastatin     Rosuvastatin         Review of Systems:  Constitutional: No fever or chills  Respiratory: No cough or shortness of breath  Cardiovascular: No chest pain or palpitations  Gastrointestinal: No nausea or vomiting  Neurological: non-focal    OBJECTIVE:     Vital Signs (Most Recent)  Temp: 98.1 °F (36.7 °C) (05/26/25 0311)  Pulse: 67 (05/26/25 0900)  Resp: 16 (05/26/25 0633)  BP: 133/60 (05/26/25 0800)  SpO2: 97 % (05/26/25 0900)    Vital Signs Range (Last 24H):  Temp:  [98.1 °F (36.7 °C)]   Pulse:  [50-67]   Resp:  [15-18]   BP: (126-147)/(60-66)   SpO2:  [97 %-99 %]     No intake or output data in the 24 hours ending 05/26/25 1049    Physical Exam:    General appearance:  Frail and weak  Eyes:  Conjunctivae/corneas clear. PERRL.  Lungs: Normal respiratory effort,   clear to auscultation bilaterally   Heart: Regular  rate and rhythm, S1, S2 normal, 3/6 murmur  Abdomen: Soft, nontender, ostomy + bowel sounds  Extremities: No cyanosis or clubbing.  1+ edema.    Skin: Skin color pale, texture, turgor normal. No rashes or lesions  Neurological:  nonfocal    Laboratory:  Recent Labs   Lab 05/26/25  0640   WBC 6.79   RBC 2.61*   HGB 8.1*   HCT 26.0*   *   *   MCH 31.0   MCHC 31.2*     BMP:   Recent Labs   Lab 05/26/25  0640         K 4.2      CO2 16*   BUN 90*   CREATININE 7.8*   CALCIUM 7.8*   MG 1.6     Lab Results   Component Value Date    CALCIUM 7.8 (L) 05/26/2025    PHOS 6.9 (H) 05/18/2025     BNP  Recent Labs   Lab 05/26/25  0350   BNP 1,253*     Lab Results   Component Value Date    URICACID 7.9 (H) 10/10/2024     Lab Results   Component Value Date    IRON 55 05/22/2025    TIBC 315 05/22/2025    FERRITIN 666.1 (H) 05/22/2025     Lab Results   Component Value Date    .4 (H) 05/16/2025    CALCIUM 7.8 (L) 05/26/2025    PHOS 6.9 (H) 05/18/2025       Diagnostic Results:  MRA Brain without contrast   Final Result      MR angiogram of the head appears within normal limits.         Electronically signed by: Derrek Vega   Date:    05/26/2025   Time:    10:27      MRI Brain Without Contrast   Final Result   Abnormal      Punctate focus diffusion restriction about the left frontal subcortical white matter, in keeping with a small acute infarct.  No significant surrounding edema or mass effect.  No evidence for acute intracranial hemorrhage.      Sequela of microvascular ischemic change.      This report was flagged in Epic as abnormal.         Electronically signed by: Derrek Vega   Date:    05/26/2025   Time:    10:16      US Lower Extremity Veins Left   Final Result      No evidence of deep venous thrombosis in the left lower extremity.         Electronically signed by: Derrek Vega   Date:    05/26/2025   Time:    08:23      X-Ray Chest AP Portable   Final Result      Bibasilar atelectasis with  chronic prominence of the pulmonary vascular markings.         Electronically signed by: Kiko Whitten MD   Date:    05/26/2025   Time:    06:26      MRI Soft Tissue Neck Without Contrast    (Results Pending)   MRI Cervical Spine Without Contrast    (Results Pending)   US Carotid Bilateral    (Results Pending)       ASSESSMENT/PLAN:     GRADY on Progressive worsening CKD IV-V secondary to CRS, severe vascular disease, mult episodes of GRADY with intravascular depletion with high ostomy output, diuretics, poor CO on midodrine, etc:  -followed closely by [facility]  -recently DC'd with Creat 6.3/BUN 79  -referred to vascular and transplant.   -hold diuretics  -Creat 7.8/BUN 90  -no immediate need for RRT but very soon.  -currently on eliquis and will have to discuss with neurology/cards/etc as anticipate needing tunneled dialysis line Tuesday/Wednesday.  -bicarb tabs for worsening AGMA (CKD/GI losses)  -see orders.  -Renally dose meds, avoid nephrotoxins, and monitor I/O's closely.      Acute CVA:  -MRI noted.  -defer to neurology.    -would highly recommend transfer to [facility] for higher level of care and mult specialist needed to meet his needs.    Multifactorial anemia:  -no RODRIGUE with acute CVA  -recent IV iron  -transfuse if needed.    Hypotension:  -cont midodrine.    LLE DVT:  -on eliquis    PAF:  -defer    UC w/ ileostomy:  -not a candidate for PD      DISPO:    Multiple comorbidities  Recommend palliative eval  Recommend transfer to [facility] for mult specialist under his care.      Thanks for consult  See above  Will follow along.       Total critical care time (exclusive of procedural time) : 120 minutes  Critical care was necessary to treat or prevent imminent or life-threatening deterioration of the following conditions: CKD/CVA/Anemia     Critical care was time spent personally by me on the following activities: development of treatment plan with patient or surrogate, discussions with consultants, interpretation of cardiac  output measurements, examination of patient, ordering and performing treatments and interventions, evaluation of patient's response to treatment, obtaining history from patient or surrogate, ordering and review of laboratory studies, ordering and review of radiographic studies, re-evaluation of patient's condition, pulse oximetry and blood draw for specimens              [1]   Social History  Tobacco Use    Smoking status: Never     Passive exposure: Never    Smokeless tobacco: Never   Substance Use Topics    Alcohol use: No    Drug use: No

## 2025-05-26 NOTE — PLAN OF CARE
Problem: SLP  Goal: SLP Goal  Description: 1. Pt will be able to consume thin liquids and minced and moist solids without overt s/s of airway threat or aspiration.   Outcome: Progressing       SLP to continue to follow

## 2025-05-27 ENCOUNTER — TELEPHONE (OUTPATIENT)
Dept: NEUROLOGY | Facility: CLINIC | Age: 75
End: 2025-05-27
Payer: MEDICARE

## 2025-05-27 PROBLEM — M25.461 EFFUSION OF RIGHT KNEE: Status: ACTIVE | Noted: 2025-05-27

## 2025-05-27 LAB
ABSOLUTE EOSINOPHIL (OHS): 0 K/UL
ABSOLUTE EOSINOPHIL (OHS): 0 K/UL
ABSOLUTE MONOCYTE (OHS): 0.26 K/UL (ref 0.3–1)
ABSOLUTE MONOCYTE (OHS): 0.57 K/UL (ref 0.3–1)
ABSOLUTE NEUTROPHIL COUNT (OHS): 7.52 K/UL (ref 1.8–7.7)
ABSOLUTE NEUTROPHIL COUNT (OHS): 9.41 K/UL (ref 1.8–7.7)
ANION GAP (OHS): 17 MMOL/L (ref 8–16)
APTT PPP: 32 SECONDS (ref 21–32)
APTT PPP: 44.5 SECONDS (ref 21–32)
BASOPHILS # BLD AUTO: 0.01 K/UL
BASOPHILS # BLD AUTO: 0.01 K/UL
BASOPHILS NFR BLD AUTO: 0.1 %
BASOPHILS NFR BLD AUTO: 0.1 %
BUN SERPL-MCNC: 95 MG/DL (ref 8–23)
CALCIUM SERPL-MCNC: 7.6 MG/DL (ref 8.7–10.5)
CHLORIDE SERPL-SCNC: 106 MMOL/L (ref 95–110)
CHOLEST SERPL-MCNC: 109 MG/DL (ref 120–199)
CHOLEST/HDLC SERPL: 2.5 {RATIO} (ref 2–5)
CO2 SERPL-SCNC: 15 MMOL/L (ref 23–29)
CREAT SERPL-MCNC: 7.9 MG/DL (ref 0.5–1.4)
ERYTHROCYTE [DISTWIDTH] IN BLOOD BY AUTOMATED COUNT: 18 % (ref 11.5–14.5)
ERYTHROCYTE [DISTWIDTH] IN BLOOD BY AUTOMATED COUNT: 18.2 % (ref 11.5–14.5)
GFR SERPLBLD CREATININE-BSD FMLA CKD-EPI: 7 ML/MIN/1.73/M2
GLUCOSE SERPL-MCNC: 151 MG/DL (ref 70–110)
HAV IGM SERPL QL IA: NORMAL
HBV CORE AB SERPL QL IA: NORMAL
HBV CORE IGM SERPL QL IA: NORMAL
HBV SURFACE AB SER-ACNC: <3 MIU/ML
HBV SURFACE AB SERPL IA-ACNC: NORMAL M[IU]/ML
HBV SURFACE AG SERPL QL IA: NORMAL
HCT VFR BLD AUTO: 25.2 % (ref 40–54)
HCT VFR BLD AUTO: 25.5 % (ref 40–54)
HCV AB SERPL QL IA: NORMAL
HDLC SERPL-MCNC: 43 MG/DL (ref 40–75)
HDLC SERPL: 39.4 % (ref 20–50)
HGB BLD-MCNC: 8.1 GM/DL (ref 14–18)
HGB BLD-MCNC: 8.2 GM/DL (ref 14–18)
IMM GRANULOCYTES # BLD AUTO: 0.05 K/UL (ref 0–0.04)
IMM GRANULOCYTES # BLD AUTO: 0.05 K/UL (ref 0–0.04)
IMM GRANULOCYTES NFR BLD AUTO: 0.5 % (ref 0–0.5)
IMM GRANULOCYTES NFR BLD AUTO: 0.6 % (ref 0–0.5)
INR PPP: 1.3 (ref 0.8–1.2)
LDLC SERPL CALC-MCNC: 57.6 MG/DL (ref 63–159)
LYMPHOCYTES # BLD AUTO: 0.28 K/UL (ref 1–4.8)
LYMPHOCYTES # BLD AUTO: 0.42 K/UL (ref 1–4.8)
MAGNESIUM SERPL-MCNC: 1.6 MG/DL (ref 1.6–2.6)
MCH RBC QN AUTO: 31.5 PG (ref 27–31)
MCH RBC QN AUTO: 31.8 PG (ref 27–31)
MCHC RBC AUTO-ENTMCNC: 31.8 G/DL (ref 32–36)
MCHC RBC AUTO-ENTMCNC: 32.5 G/DL (ref 32–36)
MCV RBC AUTO: 98 FL (ref 82–98)
MCV RBC AUTO: 99 FL (ref 82–98)
NONHDLC SERPL-MCNC: 66 MG/DL
NUCLEATED RBC (/100WBC) (OHS): 0 /100 WBC
NUCLEATED RBC (/100WBC) (OHS): 0 /100 WBC
PATHOLOGIST REVIEW - CRYSTALS BF (OHS): NORMAL
PHOSPHATE SERPL-MCNC: 8.3 MG/DL (ref 2.7–4.5)
PLATELET # BLD AUTO: 125 K/UL (ref 150–450)
PLATELET # BLD AUTO: 126 K/UL (ref 150–450)
PMV BLD AUTO: 8.9 FL (ref 9.2–12.9)
PMV BLD AUTO: 9.4 FL (ref 9.2–12.9)
POCT GLUCOSE: 162 MG/DL (ref 70–110)
POCT GLUCOSE: 189 MG/DL (ref 70–110)
POCT GLUCOSE: 285 MG/DL (ref 70–110)
POTASSIUM SERPL-SCNC: 4.6 MMOL/L (ref 3.5–5.1)
PROTHROMBIN TIME: 14.1 SECONDS (ref 9–12.5)
RBC # BLD AUTO: 2.57 M/UL (ref 4.6–6.2)
RBC # BLD AUTO: 2.58 M/UL (ref 4.6–6.2)
RELATIVE EOSINOPHIL (OHS): 0 %
RELATIVE EOSINOPHIL (OHS): 0 %
RELATIVE LYMPHOCYTE (OHS): 3.4 % (ref 18–48)
RELATIVE LYMPHOCYTE (OHS): 4 % (ref 18–48)
RELATIVE MONOCYTE (OHS): 3.2 % (ref 4–15)
RELATIVE MONOCYTE (OHS): 5.4 % (ref 4–15)
RELATIVE NEUTROPHIL (OHS): 90 % (ref 38–73)
RELATIVE NEUTROPHIL (OHS): 92.7 % (ref 38–73)
SODIUM SERPL-SCNC: 138 MMOL/L (ref 136–145)
TRIGL SERPL-MCNC: 42 MG/DL (ref 30–150)
W ERYTHROPOIETIN (EPO): 21.8 MIU/ML
WBC # BLD AUTO: 10.46 K/UL (ref 3.9–12.7)
WBC # BLD AUTO: 8.12 K/UL (ref 3.9–12.7)

## 2025-05-27 PROCEDURE — 85730 THROMBOPLASTIN TIME PARTIAL: CPT | Performed by: HOSPITALIST

## 2025-05-27 PROCEDURE — 86706 HEP B SURFACE ANTIBODY: CPT | Mod: 59 | Performed by: NURSE PRACTITIONER

## 2025-05-27 PROCEDURE — A4216 STERILE WATER/SALINE, 10 ML: HCPCS | Performed by: HOSPITALIST

## 2025-05-27 PROCEDURE — 25000003 PHARM REV CODE 250: Performed by: HOSPITALIST

## 2025-05-27 PROCEDURE — 25000003 PHARM REV CODE 250: Performed by: NURSE PRACTITIONER

## 2025-05-27 PROCEDURE — 36415 COLL VENOUS BLD VENIPUNCTURE: CPT | Performed by: HOSPITALIST

## 2025-05-27 PROCEDURE — 02HV33Z INSERTION OF INFUSION DEVICE INTO SUPERIOR VENA CAVA, PERCUTANEOUS APPROACH: ICD-10-PCS | Performed by: RADIOLOGY

## 2025-05-27 PROCEDURE — C1894 INTRO/SHEATH, NON-LASER: HCPCS | Performed by: RADIOLOGY

## 2025-05-27 PROCEDURE — 83735 ASSAY OF MAGNESIUM: CPT | Performed by: HOSPITALIST

## 2025-05-27 PROCEDURE — 90935 HEMODIALYSIS ONE EVALUATION: CPT

## 2025-05-27 PROCEDURE — 86704 HEP B CORE ANTIBODY TOTAL: CPT | Performed by: NURSE PRACTITIONER

## 2025-05-27 PROCEDURE — 21400001 HC TELEMETRY ROOM

## 2025-05-27 PROCEDURE — 80074 ACUTE HEPATITIS PANEL: CPT | Performed by: NURSE PRACTITIONER

## 2025-05-27 PROCEDURE — 5A1D70Z PERFORMANCE OF URINARY FILTRATION, INTERMITTENT, LESS THAN 6 HOURS PER DAY: ICD-10-PCS | Performed by: RADIOLOGY

## 2025-05-27 PROCEDURE — 63600175 PHARM REV CODE 636 W HCPCS: Performed by: RADIOLOGY

## 2025-05-27 PROCEDURE — 80061 LIPID PANEL: CPT | Performed by: HOSPITALIST

## 2025-05-27 PROCEDURE — 36415 COLL VENOUS BLD VENIPUNCTURE: CPT | Performed by: NURSE PRACTITIONER

## 2025-05-27 PROCEDURE — 63600175 PHARM REV CODE 636 W HCPCS: Performed by: HOSPITALIST

## 2025-05-27 PROCEDURE — 94761 N-INVAS EAR/PLS OXIMETRY MLT: CPT

## 2025-05-27 PROCEDURE — 85610 PROTHROMBIN TIME: CPT | Performed by: HOSPITALIST

## 2025-05-27 PROCEDURE — C1750 CATH, HEMODIALYSIS,LONG-TERM: HCPCS | Performed by: RADIOLOGY

## 2025-05-27 PROCEDURE — 80048 BASIC METABOLIC PNL TOTAL CA: CPT | Performed by: HOSPITALIST

## 2025-05-27 PROCEDURE — 85025 COMPLETE CBC W/AUTO DIFF WBC: CPT | Performed by: HOSPITALIST

## 2025-05-27 PROCEDURE — 0JH63XZ INSERTION OF TUNNELED VASCULAR ACCESS DEVICE INTO CHEST SUBCUTANEOUS TISSUE AND FASCIA, PERCUTANEOUS APPROACH: ICD-10-PCS | Performed by: RADIOLOGY

## 2025-05-27 PROCEDURE — 84100 ASSAY OF PHOSPHORUS: CPT | Performed by: HOSPITALIST

## 2025-05-27 DEVICE — PRECISION CHRONIC CATHETER KIT,SYMMETRICAL TIP, TAL VENATRAC STYLET,14.5 FR/CH (4.8 MM) X 23 CM
Type: IMPLANTABLE DEVICE | Site: CHEST  WALL | Status: FUNCTIONAL
Brand: PALINDROME

## 2025-05-27 RX ORDER — FENTANYL CITRATE 50 UG/ML
INJECTION, SOLUTION INTRAMUSCULAR; INTRAVENOUS
Status: DISCONTINUED | OUTPATIENT
Start: 2025-05-27 | End: 2025-05-27 | Stop reason: HOSPADM

## 2025-05-27 RX ORDER — CEFEPIME HYDROCHLORIDE 1 G/1
1 INJECTION, POWDER, FOR SOLUTION INTRAMUSCULAR; INTRAVENOUS
Status: DISCONTINUED | OUTPATIENT
Start: 2025-05-27 | End: 2025-05-31

## 2025-05-27 RX ORDER — HEPARIN SOD,PORCINE/0.9 % NACL 1000/500ML
INTRAVENOUS SOLUTION INTRAVENOUS
Status: DISCONTINUED | OUTPATIENT
Start: 2025-05-27 | End: 2025-05-27 | Stop reason: HOSPADM

## 2025-05-27 RX ORDER — CEFAZOLIN SODIUM 1 G/3ML
INJECTION, POWDER, FOR SOLUTION INTRAMUSCULAR; INTRAVENOUS
Status: DISCONTINUED | OUTPATIENT
Start: 2025-05-27 | End: 2025-05-27 | Stop reason: HOSPADM

## 2025-05-27 RX ORDER — DEXAMETHASONE SODIUM PHOSPHATE 10 MG/ML
10 INJECTION, SOLUTION INTRA-ARTICULAR; INTRALESIONAL; INTRAMUSCULAR; INTRAVENOUS; SOFT TISSUE EVERY 8 HOURS
Status: DISCONTINUED | OUTPATIENT
Start: 2025-05-27 | End: 2025-05-30

## 2025-05-27 RX ORDER — LIDOCAINE HYDROCHLORIDE 20 MG/ML
INJECTION, SOLUTION INFILTRATION; PERINEURAL
Status: DISCONTINUED | OUTPATIENT
Start: 2025-05-27 | End: 2025-05-27 | Stop reason: HOSPADM

## 2025-05-27 RX ORDER — SEVELAMER CARBONATE 800 MG/1
800 TABLET, FILM COATED ORAL
Status: DISCONTINUED | OUTPATIENT
Start: 2025-05-27 | End: 2025-06-04 | Stop reason: HOSPADM

## 2025-05-27 RX ORDER — MIDAZOLAM HYDROCHLORIDE 2 MG/2ML
INJECTION, SOLUTION INTRAMUSCULAR; INTRAVENOUS
Status: DISCONTINUED | OUTPATIENT
Start: 2025-05-27 | End: 2025-05-27 | Stop reason: HOSPADM

## 2025-05-27 RX ORDER — HEPARIN SODIUM 1000 [USP'U]/ML
INJECTION, SOLUTION INTRAVENOUS; SUBCUTANEOUS
Status: DISCONTINUED | OUTPATIENT
Start: 2025-05-27 | End: 2025-05-27 | Stop reason: HOSPADM

## 2025-05-27 RX ORDER — MUPIROCIN 20 MG/G
OINTMENT TOPICAL 2 TIMES DAILY
Status: COMPLETED | OUTPATIENT
Start: 2025-05-27 | End: 2025-06-01

## 2025-05-27 RX ORDER — HEPARIN SODIUM,PORCINE/D5W 25000/250
0-40 INTRAVENOUS SOLUTION INTRAVENOUS CONTINUOUS
Status: DISCONTINUED | OUTPATIENT
Start: 2025-05-27 | End: 2025-05-28

## 2025-05-27 RX ADMIN — Medication 10 ML: at 08:05

## 2025-05-27 RX ADMIN — ASPIRIN 81 MG: 81 TABLET, COATED ORAL at 03:05

## 2025-05-27 RX ADMIN — LIDOCAINE 1 PATCH: 50 PATCH CUTANEOUS at 03:05

## 2025-05-27 RX ADMIN — FERROUS SULFATE TAB 325 MG (65 MG ELEMENTAL FE) 1 EACH: 325 (65 FE) TAB at 03:05

## 2025-05-27 RX ADMIN — BRIMONIDINE TARTRATE 1 DROP: 1.5 SOLUTION/ DROPS OPHTHALMIC at 03:05

## 2025-05-27 RX ADMIN — Medication 10 ML: at 06:05

## 2025-05-27 RX ADMIN — PRAVASTATIN SODIUM 40 MG: 20 TABLET ORAL at 03:05

## 2025-05-27 RX ADMIN — TAMSULOSIN HYDROCHLORIDE 0.4 MG: 0.4 CAPSULE ORAL at 03:05

## 2025-05-27 RX ADMIN — CEFEPIME 1 G: 1 INJECTION, POWDER, FOR SOLUTION INTRAMUSCULAR; INTRAVENOUS at 06:05

## 2025-05-27 RX ADMIN — MORPHINE SULFATE 2 MG: 2 INJECTION, SOLUTION INTRAMUSCULAR; INTRAVENOUS at 06:05

## 2025-05-27 RX ADMIN — VANCOMYCIN HYDROCHLORIDE 1500 MG: 1.5 INJECTION, POWDER, LYOPHILIZED, FOR SOLUTION INTRAVENOUS at 06:05

## 2025-05-27 RX ADMIN — BRIMONIDINE TARTRATE 1 DROP: 1.5 SOLUTION/ DROPS OPHTHALMIC at 08:05

## 2025-05-27 RX ADMIN — HEPARIN SODIUM 12 UNITS/KG/HR: 10000 INJECTION, SOLUTION INTRAVENOUS at 07:05

## 2025-05-27 RX ADMIN — MUPIROCIN: 20 OINTMENT TOPICAL at 08:05

## 2025-05-27 RX ADMIN — Medication 10 ML: at 05:05

## 2025-05-27 RX ADMIN — DEXAMETHASONE SODIUM PHOSPHATE 10 MG: 10 INJECTION INTRAMUSCULAR; INTRAVENOUS at 06:05

## 2025-05-27 RX ADMIN — LEVOTHYROXINE SODIUM 25 MCG: 25 TABLET ORAL at 05:05

## 2025-05-27 RX ADMIN — MORPHINE SULFATE 2 MG: 2 INJECTION, SOLUTION INTRAMUSCULAR; INTRAVENOUS at 03:05

## 2025-05-27 RX ADMIN — OXYCODONE HYDROCHLORIDE 5 MG: 5 TABLET ORAL at 03:05

## 2025-05-27 RX ADMIN — METOPROLOL SUCCINATE 12.5 MG: 25 TABLET, EXTENDED RELEASE ORAL at 03:05

## 2025-05-27 RX ADMIN — AMIODARONE HYDROCHLORIDE 200 MG: 200 TABLET ORAL at 03:05

## 2025-05-27 RX ADMIN — GABAPENTIN 200 MG: 100 CAPSULE ORAL at 08:05

## 2025-05-27 RX ADMIN — PANTOPRAZOLE SODIUM 40 MG: 40 TABLET, DELAYED RELEASE ORAL at 03:05

## 2025-05-27 RX ADMIN — INSULIN ASPART 1 UNITS: 100 INJECTION, SOLUTION INTRAVENOUS; SUBCUTANEOUS at 08:05

## 2025-05-27 RX ADMIN — ASCORBIC ACID, THIAMINE MONONITRATE,RIBOFLAVIN, NIACINAMIDE, PYRIDOXINE HYDROCHLORIDE, FOLIC ACID, CYANOCOBALAMIN, BIOTIN, CALCIUM PANTOTHENATE, 1 CAPSULE: 100; 1.5; 1.7; 20; 10; 1; 6000; 150000; 5 CAPSULE, LIQUID FILLED ORAL at 03:05

## 2025-05-27 NOTE — PROGRESS NOTES
05/27/25 1445   Post-Hemodialysis Assessment   Rinseback Volume (mL) 250 mL   Blood Volume Processed (Liters) 37.8 L   Dialyzer Clearance Clear   Duration of Treatment 150 minutes   Total UF (mL) 500 mL   Net Fluid Removal 0   Patient Response to Treatment tx tolorated well   Post-Hemodialysis Comments pt completed 1st tx successfully.

## 2025-05-27 NOTE — ASSESSMENT & PLAN NOTE
-Noted history - ?pAfib? And plans for ablation at [facility] 7/15/25.  -Continue home amiodarone and metoprolol    Restarted anticoagulation heparin drip for now.

## 2025-05-27 NOTE — HOSPITAL COURSE
Patient is 75-year-old man with a non dysfunction on midodrine, coronary artery disease, history of arrhythmias including atrial fibrillation and ventricular tachycardia,  chronic kidney disease stage 5, ulcerative colitis status post remote colectomy with ileostomy, cervical spinal disease status post remote spinal fusion who presents with severe pain in the right shoulder and left groin along with evidence of worsening renal function.    Nephrology consult recommended initiating hemodialysis.  A tunneled dialysis catheter placed today.    Recent imaging showed evidence of small acute left frontal stroke likely unrelated to current presentation.  Evaluated by neurology recommended continuing anticoagulation.    Dr. Adan Santiago (Spine Orthopedic Surgery) evaluated patient and recommended high-dose steroids to treat spinal stenosis along with therapy.    Patient also underwent an arthrocentesis of his right knee.  Patient with 50,000 white cells with neutrophil predominance.  Gram stain negative.    Cultures submitted.

## 2025-05-27 NOTE — ASSESSMENT & PLAN NOTE
Patient presents with significant worsening debility from baseline. MRI C-spine shows severe degenerative changes producing severe foraminal and central canal stenosis from C2 through C5 and severe left foraminal stenosis at C5-6.  MRI soft tissue neck shows no soft tissue mass or lymphadenopathy throughout the soft tissues of the neck.     Dr. Adan Santiago (Spine Orthopedic Surgery) evaluated patient and recommended high-dose steroids to treat spinal stenosis along with therapy.    Patient also underwent an arthrocentesis of his right knee.  Patient with 50,000 white cells with neutrophil predominance.  Gram stain negative.   Dr. Merrill suspect infusion likely inflammatory and not a septic joint.   Cultures submitted and started on empiric antibiotic therapy pending culture results.

## 2025-05-27 NOTE — ASSESSMENT & PLAN NOTE
Doppler US of left leg without any evidence of DVT presently.    Anticoagulation held for  tunneled line placement.. Restart heparin infusion tonight.

## 2025-05-27 NOTE — PT/OT/SLP PROGRESS
Occupational Therapy      Patient Name:  Jarrett Charlton Jr.   MRN:  [id number]    Patient not seen today secondary to off floor for procedure and then at dialysis. Will follow-up as schedule permits.    PARAS Christine  5/27/2025

## 2025-05-27 NOTE — PROGRESS NOTES
"[address] Medicine  Progress Note    Patient Name: Jarrett Charlton Jr.  MRN: [id number]  Patient Class: IP- Inpatient   Admission Date: 5/26/2025  Length of Stay: 0 days  Attending Physician: Darrell Cheema MD  Primary Care Provider: Olga Hampton MD        Subjective     Principal Problem:Cervical spinal stenosis        HPI:  As per Poli Damon MD:    "Mr. Charlton is a 75 year old man with autonomic dysfunction on midodrine, coronary artery disease, progressing CKDV, BPH prostate cancer s/p TURP, BPH, cervical disk disease (s/p spinal fusion in the 80s), ulcerative colitis (s/p colectomy and illiostomy, ventricular tachycardia / ?atrial fibrillation s/p ablation who presented for evaluation of severe pain in his right shoulder / neck and left groin.  History is obtained by patient and his daughter at bedside.  They note a fall several months ago with abrasion to his lumbar back and shins.  He also had a fall 3 days ago which he states occurred when he was turning and tripped over his shoes.  He didn't hit his head, but did fall onto his right hand/arm.  He had no severe pain at that time.  He states that yesterday he woke up feeling ok, but as the day progressed he developed severe left inner groin pain.  The pain has been progressively worse throughout the day and is exacerbated by walking and laying down.  He also reports developing severe pain in his neck starting at the posterior base of his cranium and radiating down to his right shoulder.  The pain is severe and also worse with laying flat.  He describes the neck/shoulder pain as throbbing and intense.  He reports baseline dyspnea on exertion which is stable.  He denies fevers, nausea, vomiting, diarrhea, diminished urine output, cough, increased leg swelling, diminished oral intake, palpitations, chest pain and abdominal pain.  In the ED he was treated with methocarbamol, morphine and oxycodone with moderate relief " "of his pain.      Of note, patient recently treated at [facility] for campylobacter gastroenteritis and enterobacter bacteremia (discharged 4/28).  He was also treated at [facility] for worsening renal failure and hyperkalemia and discharged 5/18 with referral to vascular surgery for AV graft placement.  His daughter reports it is planned to be placed in July.  Daughter also reports planned ERCP/EUS for pancreatic lesion in July and cardiac ablation planned for July 15th."    Overview/Hospital Course:  Patient is 75-year-old man with a non dysfunction on midodrine, coronary artery disease, history of arrhythmias including atrial fibrillation and ventricular tachycardia,  chronic kidney disease stage 5, ulcerative colitis status post remote colectomy with ileostomy, cervical spinal disease status post remote spinal fusion who presents with severe pain in the right shoulder and left groin.    Recent imaging showed evidence of small acute left frontal stroke likely unrelated to current presentation.  Evaluated by neurology recommended continuing anticoagulation.    Dr. Adan Santiago (Spine Orthopedic Surgery) evaluated patient and recommended high-dose steroids to treat spinal stenosis along with therapy.    Patient also underwent an arthrocentesis of his right knee.  Patient with 50,000 white cells with neutrophil predominance.  Gram stain negative.    Cultures submitted.    Interval History: Patient reports pain in the right knee.    Tunneled dialysis catheter placed for initiating hemodialysis.    Review of Systems   Constitutional:  Negative for chills and fever.   Respiratory:  Negative for shortness of breath.    Cardiovascular:  Negative for chest pain.   Gastrointestinal:  Negative for abdominal pain, constipation, diarrhea, nausea and vomiting.   Musculoskeletal:  Positive for arthralgias and back pain.     Objective:     Vital Signs (Most Recent):  Temp: 97.8 °F (36.6 °C) (05/27/25 1624)  Pulse: 63 (05/27/25 " 1624)  Resp: 16 (05/27/25 1624)  BP: (!) 169/75 (05/27/25 1624)  SpO2: 97 % (05/27/25 1624) Vital Signs (24h Range):  Temp:  [97.8 °F (36.6 °C)-98.9 °F (37.2 °C)] 97.8 °F (36.6 °C)  Pulse:  [52-70] 63  Resp:  [15-18] 16  SpO2:  [97 %-99 %] 97 %  BP: (126-169)/(59-75) 169/75     Weight: 78.2 kg (172 lb 6.4 oz)  Body mass index is 22.13 kg/m².    Intake/Output Summary (Last 24 hours) at 5/27/2025 1751  Last data filed at 5/27/2025 1445  Gross per 24 hour   Intake 150 ml   Output 1350 ml   Net -1200 ml         Physical Exam  Constitutional:       Appearance: He is ill-appearing.   Cardiovascular:      Rate and Rhythm: Normal rate and regular rhythm.      Heart sounds: Normal heart sounds. No murmur heard.     No friction rub. No gallop.      Comments:  Tunneled hemodialysis catheter to right chest wall  Pulmonary:      Effort: Pulmonary effort is normal. No respiratory distress.      Breath sounds: Normal breath sounds. No wheezing or rales.   Abdominal:      General: Bowel sounds are normal. There is no distension.      Palpations: Abdomen is soft.      Tenderness: There is no abdominal tenderness.      Comments:  Colostomy with stool in bag.   Musculoskeletal:         General: Tenderness present. Normal range of motion.      Comments: Right knee mildly warm and tender to touch without erythema   Skin:     General: Skin is warm and dry.      Coloration: Skin is not pale.      Findings: No erythema or rash.   Neurological:      Mental Status: He is alert and oriented to person, place, and time.               Significant Labs: All pertinent labs within the past 24 hours have been reviewed.    Significant Imaging: I have reviewed all pertinent imaging results/findings within the past 24 hours.      Assessment & Plan  Cervical spinal stenosis  Neck pain on right side  Effusion of right knee  Patient presents with significant worsening debility from baseline. MRI C-spine shows severe degenerative changes producing severe  foraminal and central canal stenosis from C2 through C5 and severe left foraminal stenosis at C5-6.  MRI soft tissue neck shows no soft tissue mass or lymphadenopathy throughout the soft tissues of the neck.     Dr. Adan Santiago (Spine Orthopedic Surgery) evaluated patient and recommended high-dose steroids to treat spinal stenosis along with therapy.    Patient also underwent an arthrocentesis of his right knee.  Patient with 50,000 white cells with neutrophil predominance.  Gram stain negative.   Dr. Merrill suspect infusion likely inflammatory and not a septic joint.   Cultures submitted and started on empiric antibiotic therapy pending culture results.  History of deep venous thrombosis (DVT) of distal vein of left lower extremity  Doppler US of left leg without any evidence of DVT presently.    Anticoagulation held for  tunneled line placement.. Restart heparin infusion tonight.  Falls  Debility  Autonomic dysfunction  Acute ischemic stroke  Orthostatic hypotension  -Noted at least 2 falls recently and saw Dr. Patel with neurology a few days ago.  His note was reviewed, noted concern for possible stroke and I ordered his suggested imaging workup.    -Suspect falls and debility are multifactorial.  -MRI brain notable for punctate focus diffusion restriction about the left frontal subcortical white matter, in keeping with a small acute infarct. No significant surrounding edema or mass effect. No evidence for acute intracranial hemorrhage.   -MRA brain was normal  Echocardiogram did not show thrombolytic source.  Evaluated by neurology recommended continuing anticoagulation.    Anticoagulation held for  hemodialysis line placement.  Start heparin tonight.  Acute renal failure with acute tubular necrosis superimposed on stage 5 chronic kidney disease, not on chronic dialysis  Dialysis line placement and HD initiation this hospitalization.  Acute on chronic combined systolic and diastolic congestive heart  failure  Essential hypertension  CAD (coronary artery disease)  -On admit he denies any sob despite elevated BNP and peripheral edema.  Denies chest pain.  Troponin negative.  -Echo 2/25/25 showed EF 30%, indeterminate diastolic function, mild LA dilation, mild/moderate MR  -Strict ins/outs and daily weights with low sodium fluid restricted diet  -Continue home aspirin, statin, midodrine and metoprolol.  Atrial paroxysmal tachycardia  -Noted history - ?pAfib? And plans for ablation at [facility] 7/15/25.  -Continue home amiodarone and metoprolol    Restarted anticoagulation heparin drip for now.  Thrombocytopenia  Mild, stable.  T2DM (type 2 diabetes mellitus)  -A1c 5.7  -Home regimen includes glimepiride - holding for now  -Sugars reasonably controlled  -Will treat with SSI ac/hs for now  History of ulcerative colitis  High output ileostomy  -Noted history  -Ostomy functioning as it should per patient.  Moderate protein-calorie malnutrition  -Nutrition consulted. Most recent weight and BMI monitored-   -Measurements:  Wt Readings from Last 1 Encounters:   05/27/25 78.2 kg (172 lb 6.4 oz)   Body mass index is 22.13 kg/m².  Gout  -Noted history  -Hold prior colchicine.    Hypothyroidism  -Continue home levothyroxine  Type 2 diabetes mellitus  Reasonably controlled with current regimen.  Will continue with current regimen and continue to monitor.  VTE Risk Mitigation (From admission, onward)           Ordered     Place sequential compression device  Until discontinued         05/26/25 0808     IP VTE HIGH RISK PATIENT  Once         05/26/25 0808     Place sequential compression device  Until discontinued         05/26/25 0808     Place sequential compression device  Until discontinued         05/26/25 0546                    Darrell Cheema MD  Department of Hospital Medicine   [facility] ([address])

## 2025-05-27 NOTE — SUBJECTIVE & OBJECTIVE
Interval History: Patient reports pain in the right knee.    Tunneled dialysis catheter placed for initiating hemodialysis.    Review of Systems   Constitutional:  Negative for chills and fever.   Respiratory:  Negative for shortness of breath.    Cardiovascular:  Negative for chest pain.   Gastrointestinal:  Negative for abdominal pain, constipation, diarrhea, nausea and vomiting.   Musculoskeletal:  Positive for arthralgias and back pain.     Objective:     Vital Signs (Most Recent):  Temp: 97.8 °F (36.6 °C) (05/27/25 1624)  Pulse: 63 (05/27/25 1624)  Resp: 16 (05/27/25 1624)  BP: (!) 169/75 (05/27/25 1624)  SpO2: 97 % (05/27/25 1624) Vital Signs (24h Range):  Temp:  [97.8 °F (36.6 °C)-98.9 °F (37.2 °C)] 97.8 °F (36.6 °C)  Pulse:  [52-70] 63  Resp:  [15-18] 16  SpO2:  [97 %-99 %] 97 %  BP: (126-169)/(59-75) 169/75     Weight: 78.2 kg (172 lb 6.4 oz)  Body mass index is 22.13 kg/m².    Intake/Output Summary (Last 24 hours) at 5/27/2025 1751  Last data filed at 5/27/2025 1445  Gross per 24 hour   Intake 150 ml   Output 1350 ml   Net -1200 ml         Physical Exam  Constitutional:       Appearance: He is ill-appearing.   Cardiovascular:      Rate and Rhythm: Normal rate and regular rhythm.      Heart sounds: Normal heart sounds. No murmur heard.     No friction rub. No gallop.      Comments:  Tunneled hemodialysis catheter to right chest wall  Pulmonary:      Effort: Pulmonary effort is normal. No respiratory distress.      Breath sounds: Normal breath sounds. No wheezing or rales.   Abdominal:      General: Bowel sounds are normal. There is no distension.      Palpations: Abdomen is soft.      Tenderness: There is no abdominal tenderness.      Comments:  Colostomy with stool in bag.   Musculoskeletal:         General: Tenderness present. Normal range of motion.      Comments: Right knee mildly warm and tender to touch without erythema   Skin:     General: Skin is warm and dry.      Coloration: Skin is not pale.       Findings: No erythema or rash.   Neurological:      Mental Status: He is alert and oriented to person, place, and time.               Significant Labs: All pertinent labs within the past 24 hours have been reviewed.    Significant Imaging: I have reviewed all pertinent imaging results/findings within the past 24 hours.

## 2025-05-27 NOTE — TELEPHONE ENCOUNTER
Staff was unable to contact the number left. Staff called it several times and its says the number you have reach is not in service.

## 2025-05-27 NOTE — CONSULTS
Consult/H&P Note  Interventional Radiology    Consult Requested By: nephrology    Reason for Consult: acute on chronic kidney injury    SUBJECTIVE:     Chief Complaint: weakness    History of Present Illness: 74 yo M with multiple comorbidites, acute on chronic kidney injury    Past Medical History:   Diagnosis Date    Autonomic dysfunction 11/2023 11/2023 neuro note    Basal cell carcinoma     BPH (benign prostatic hyperplasia)     s/p TURP    CAD (coronary artery disease) 2017    status post PCI to mid LAD in 2017 (LHC in 2021 showed patent stent, otherwise nonobstructive CAD)    Cancer of prostate     s/p TURP    Chronic kidney disease     DDD (degenerative disc disease) 10/21/2013    Diabetes mellitus with renal complications     Disc disease, degenerative, cervical     DVT (deep venous thrombosis)     Encounter for blood transfusion     GERD (gastroesophageal reflux disease)     History of ulcerative colitis 1982    s/p colectomy and ileostomy    HLD (hyperlipidemia)     Ileostomy in place 1982    RBBB     Squamous cell carcinoma 03/08/2018    Left superior helix near insertion    Squamous cell carcinoma 04/12/2018    Left forearm x 5    Ventricular tachycardia      Past Surgical History:   Procedure Laterality Date    ABLATION, SVT, ACCESSORY PATHWAY N/A 04/30/2024    Procedure: Ablation, SVT, Accessory Pathway;  Surgeon: Franky Taylor MD;  Location: [facility] EP LAB;  Service: Cardiology;  Laterality: N/A;  VT, RFA, Carto, MAC, GP, 3 Prep *MDT ILR in situ*    cardiac stents      CATARACT EXTRACTION Bilateral     CERVICAL FUSION      CHOLECYSTECTOMY N/A 02/14/2023    Procedure: CHOLECYSTECTOMY;  Surgeon: Mike Joyce MD;  Location: [facility];  Service: General;  Laterality: N/A;  very high probabilty of converison to open    colectomy and ileostomy  1985    EGD, WITH CLOSED BIOPSY  04/04/2025    ENDOSCOPIC ULTRASOUND OF UPPER GASTROINTESTINAL TRACT N/A 02/10/2023    Procedure: ULTRASOUND, UPPER GI TRACT,  ENDOSCOPIC;  Surgeon: Poli Fuller MD;  Location: [facility] ENDO;  Service: Endoscopy;  Laterality: N/A;    ERCP N/A 02/10/2023    Procedure: ERCP (ENDOSCOPIC RETROGRADE CHOLANGIOPANCREATOGRAPHY);  Surgeon: Poli Fuller MD;  Location: [facility] ENDO;  Service: Endoscopy;  Laterality: N/A;    ESOPHAGOGASTRODUODENOSCOPY N/A 4/4/2025    Procedure: EGD (ESOPHAGOGASTRODUODENOSCOPY);  Surgeon: Melania Gibson MD;  Location: [facility] ENDO;  Service: Endoscopy;  Laterality: N/A;    EXCISION OF PAROTID GLAND Left 12/18/2020    Procedure: EXCISION, PAROTID GLAND;  Surgeon: Michael Pinzon MD;  Location: [address] FLR;  Service: ENT;  Laterality: Left;    INSERTION OF IMPLANTABLE LOOP RECORDER  06/07/2021    INSERTION OF IMPLANTABLE LOOP RECORDER Left 06/07/2021    Procedure: INSERTION, IMPLANTABLE LOOP RECORDER;  Surgeon: Romario Dao MD;  Location: [facility] CATH LAB;  Service: Cardiology;  Laterality: Left;    LEFT HEART CATHETERIZATION Right 04/15/2021    Procedure: CATHETERIZATION, HEART, LEFT;  Surgeon: Marcio Jones MD;  Location: [facility] CATH LAB;  Service: Cardiology;  Laterality: Right;    LYSIS OF ADHESIONS N/A 11/09/2020    Procedure: LYSIS, ADHESIONS,  ERAS low;  Surgeon: CED Haley MD;  Location: [facility] OR 2ND FLR;  Service: Colon and Rectal;  Laterality: N/A;    LYSIS OF ADHESIONS N/A 02/14/2023    Procedure: LYSIS, ADHESIONS;  Surgeon: Mike Joyce MD;  Location: [facility] OR;  Service: General;  Laterality: N/A;    POUCHOSCOPY N/A 04/06/2022    Procedure: ENDOSCOPY, POUCH, SMALL INTESTINE, DIAGNOSTIC;  Surgeon: Dieter Juarez MD;  Location: [facility] ENDO (2ND FLR);  Service: Endoscopy;  Laterality: N/A;    REPAIR, HERNIA, PARASTOMAL N/A 11/09/2020    Procedure: REPAIR, HERNIA, PARASTOMAL;  Surgeon: CED Haley MD;  Location: [facility] OR 2ND FLR;  Service: Colon and Rectal;  Laterality: N/A;    TRANSURETHRAL RESECTION OF PROSTATE (TURP) WITHOUT USE OF LASER N/A 01/23/2019    Procedure: TURP, WITHOUT  USING LASER BIPOLAR;  Surgeon: Catarino Mota MD;  Location: [facility] OR [address] [place] [address];  Service: Urology;  Laterality: N/A;  1.5 HOURS    TREATMENT OF CARDIAC ARRHYTHMIA  04/30/2024    Procedure: Cardioversion or Defibrillation;  Surgeon: Franky Taylor MD;  Location: [facility] EP LAB;  Service: Cardiology;;     Family History   Problem Relation Name Age of Onset    Dementia Mother      Cancer Father      Diabetes Father      Heart disease Father      Melanoma Neg Hx      Hypertension Neg Hx      Arthritis Neg Hx       Social History[1]    Review of Systems:  Constitutional/General:Negative for fever.  Hematological/Immuno: no known coagulopathies  Respiratory: no shortness of breath  Cardiovascular: no chest pain  Gastrointestinal: no abdominal pain  Genito-Urinary: no dysuria  Musculoskeletal: positive for - muscle pain and muscular weakness  Skin: Negative for rash, itching, pigmentation changes, nail or hair changes.  Neurological: no TIA or stroke symptoms  Psychiatric: normal mood/affect, good insight/judgement      OBJECTIVE:     Vital Signs Range (Last 24H):  Temp:  [98 °F (36.7 °C)-98.9 °F (37.2 °C)]   Pulse:  [52-70]   Resp:  [15-18]   BP: (108-161)/(59-96)   SpO2:  [96 %-100 %]     Physical Exam:  General- Patient alert and oriented x3 in NAD  ENT- PERRLA,  Neck- No masses  CV- Regular rate and rhythm  Resp-  No increased WOB  GI- Non tender/non-distended  Extrem- No cyanosis, clubbing, edema.   Derm- No rashes, masses, or lesions noted  Neuro-  No focal deficits noted.     Physical Exam  Body mass index is 22.13 kg/m².    Scheduled Meds:    amiodarone  200 mg Oral Daily    aspirin  81 mg Oral Daily    brimonidine 0.15 % OPTH DROP  1 drop Both Eyes BID    ferrous sulfate  1 tablet Oral Daily    gabapentin  200 mg Oral QHS    levothyroxine  25 mcg Oral Before breakfast    LIDOcaine  1 patch Transdermal Q24H    metoprolol succinate  12.5 mg Oral Daily    midodrine  10 mg Oral TID WM    pantoprazole  40 mg Oral Daily     pravastatin  40 mg Oral Daily    sevelamer carbonate  800 mg Oral TID WM    sodium chloride 0.9%  10 mL Intravenous Q8H    tamsulosin  0.4 mg Oral Daily    vitamin renal formula (B-complex-vitamin c-folic acid)  1 capsule Oral Daily     Continuous Infusions:   PRN Meds:  Current Facility-Administered Medications:     acetaminophen, 650 mg, Oral, Q6H PRN    dextrose 50%, 12.5 g, Intravenous, PRN    dextrose 50%, 25 g, Intravenous, PRN    glucagon (human recombinant), 1 mg, Intramuscular, PRN    glucose, 16 g, Oral, PRN    glucose, 24 g, Oral, PRN    insulin aspart U-100, 0-10 Units, Subcutaneous, QID (AC + HS) PRN    loperamide, 2 mg, Oral, QID PRN    melatonin, 6 mg, Oral, Nightly PRN    morphine, 2 mg, Intravenous, Q6H PRN    naloxone, 0.02 mg, Intravenous, PRN    ondansetron, 4 mg, Intravenous, Q4H PRN    oxyCODONE, 5 mg, Oral, Q6H PRN    sodium chloride 0.9%, 10 mL, Intravenous, PRN    sodium chloride 0.9%, 10 mL, Intravenous, PRN    Allergies:   Review of patient's allergies indicates:   Allergen Reactions    Atorvastatin     Rosuvastatin        Labs:  Recent Labs   Lab 05/26/25  1406   INR 1.2       Recent Labs   Lab 05/27/25  0309   WBC 8.12   HGB 8.1*   HCT 25.5*   MCV 99*   *      Recent Labs   Lab 05/26/25  0350 05/26/25  0640 05/27/25  0309   GLU 91   < > 151*      < > 138   K 4.2   < > 4.6      < > 106   CO2 19*   < > 15*   BUN 94*   < > 95*   CREATININE 7.8*   < > 7.9*   CALCIUM 7.9*   < > 7.6*   MG 1.7   < > 1.6   ALT 21  --   --    AST 17  --   --    ALBUMIN 2.9*  --   --    BILITOT 0.7  --   --     < > = values in this interval not displayed.       Vitals (Most Recent):  Temp: 98 °F (36.7 °C) (05/27/25 0830)  Pulse: 62 (05/27/25 0830)  Resp: 16 (05/27/25 0830)  BP: 126/60 (05/27/25 0830)  SpO2: 97 % (05/27/25 0830)    ASA: 3  Mallampati: 2    Consent obtained    ASSESSMENT/PLAN:     Tunneled HD catheter placement, moderate sedation.    Active Hospital Problems    Diagnosis  POA     Neck pain on right side [M54.2]  Yes    Left groin pain [R10.32]  Yes    Falls [R29.6]  Not Applicable    Debility [R53.81]  Yes    Acute ischemic stroke [I63.9]  Yes    Cervical spinal stenosis [M48.02]  Yes    Hypothyroidism [E03.9]  Yes    ACP (advance care planning) [Z71.89]  Not Applicable    Atrial paroxysmal tachycardia [I47.19]  Yes    Gout [M10.9]  Yes     LLE gout pain - knee-colchicine started for pain relief as well as prednisone 20 mg for 3 days then wean.  Cont to monitor serum uric acid level.        Thrombocytopenia [D69.6]  Yes    History of deep venous thrombosis (DVT) of distal vein of left lower extremity [Z86.718]  Not Applicable    Autonomic dysfunction [G90.9]  Yes    Moderate protein-calorie malnutrition [E44.0]  Yes    Acute on chronic combined systolic and diastolic congestive heart failure [I50.43]  Yes    Acute renal failure with acute tubular necrosis superimposed on stage 5 chronic kidney disease, not on chronic dialysis [N17.0, N18.5]  Yes     Avoid nephrotoxic drug and NSAIDs      Type 2 diabetes mellitus [E11.9]  Yes    High output ileostomy [R19.8, Z93.2]  Not Applicable    Orthostatic hypotension [I95.1]  Yes    CAD (coronary artery disease) [I25.10]  Yes    Essential hypertension [I10]  Yes    History of ulcerative colitis [Z87.19]  Not Applicable     s/p colectomy and ileostomy      T2DM (type 2 diabetes mellitus) [E11.9]  Yes      Resolved Hospital Problems   No resolved problems to display.           Jamie Stokes MD        [1]   Social History  Tobacco Use    Smoking status: Never     Passive exposure: Never    Smokeless tobacco: Never   Substance Use Topics    Alcohol use: No    Drug use: No

## 2025-05-27 NOTE — PROGRESS NOTES
"Nephrology  Progress Note    Admit Date: 5/26/2025   LOS: 0 days     SUBJECTIVE:     Follow-up For:  GRADY    History of Present Illness:  Patient is a 75 y.o. male presents with neck/shoulder pain.  W/up in process and preliminarily MRI with acute left frontal infarct.  Labs significant for worsening GRADY/CKD (Creat 7.8, BUN 94, BNP 1253).  Pt with extensive med hx as outlined below and known to me from recent admission.  Significant renal disease and followed by [facility].  Has had rapid progression of CKD over past year with creat steadily rising 3-4-5-6-7.  Has been referred to vascular for AVF and transplant.  Seen after multiple testings.  Looks older than stated age,  frail, etc.  C/o pain in neck, head.       EPIC reviewed.  Case discussed with team.          Interval History:     More awake/alert today.  S/p knee aspiration yesterday.  Ext discussion about ESRD and RRT.  Agrees to start HD.  See below.      Review of Systems:  Constitutional: No fever or chills  Respiratory: No cough or shortness of breath  Cardiovascular: No chest pain or palpitations  Gastrointestinal: No nausea or vomiting  Neurological: No confusion or weakness    OBJECTIVE:     Vital Signs Range (Last 24H):  BP (!) 161/69   Pulse 67   Temp 98.1 °F (36.7 °C) (Oral)   Resp 15   Ht 6' 2" (1.88 m)   Wt 76.2 kg (167 lb 15.9 oz)   SpO2 99%   BMI 21.57 kg/m²     Temp:  [98.1 °F (36.7 °C)-98.9 °F (37.2 °C)]   Pulse:  [52-70]   Resp:  [15-18]   BP: (108-161)/(59-96)   SpO2:  [96 %-100 %]     I & O (Last 24H):  Intake/Output Summary (Last 24 hours) at 5/27/2025 0550  Last data filed at 5/27/2025 0504  Gross per 24 hour   Intake 150 ml   Output 850 ml   Net -700 ml       Physical Exam:  General appearance:  Frail and weak but NAD this am.  Eyes:  Conjunctivae/corneas clear. PERRL.  Lungs: Normal respiratory effort,   clear to auscultation bilaterally   Heart: Regular rate and rhythm, S1, S2 normal, 3/6 murmur  Abdomen: Soft, " nontender, ostomy + bowel sounds  Extremities: No cyanosis or clubbing.  1+ edema.    Skin: Skin color pale, texture, turgor normal. No rashes or lesions  Neurological:  nonfocal    Laboratory Data:  Recent Labs   Lab 05/27/25  0309   WBC 8.12   RBC 2.57*   HGB 8.1*   HCT 25.5*   *   MCV 99*   MCH 31.5*   MCHC 31.8*       BMP:   Recent Labs   Lab 05/27/25  0309   *      K 4.6      CO2 15*   BUN 95*   CREATININE 7.9*   CALCIUM 7.6*   MG 1.6   PHOS 8.3*     Lab Results   Component Value Date    CALCIUM 7.6 (L) 05/27/2025    PHOS 8.3 (H) 05/27/2025       Lab Results   Component Value Date    .4 (H) 05/16/2025    CALCIUM 7.6 (L) 05/27/2025    PHOS 8.3 (H) 05/27/2025       Lab Results   Component Value Date    URICACID 7.9 (H) 10/10/2024       BNP  Recent Labs   Lab 05/26/25  0350   BNP 1,253*       Medications:  Medication list was reviewed and changes noted under Assessment/Plan.    Diagnostic Results:    X-Ray Hip 2 or 3 views Left with Pelvis when performed   Final Result      Please see above.         Electronically signed by: Derrek Vega   Date:    05/26/2025   Time:    14:01      US Carotid Bilateral   Final Result      No evidence of a hemodynamically significant carotid bifurcation stenosis.         Electronically signed by: Derrek Vega   Date:    05/26/2025   Time:    11:23      MRI Soft Tissue Neck Without Contrast   Final Result      No soft tissue mass or lymphadenopathy throughout the soft tissues of the neck.         Electronically signed by: Derrek Vega   Date:    05/26/2025   Time:    13:11      MRI Cervical Spine Without Contrast   Final Result      Severe degenerative changes as described producing severe foraminal and central canal stenosis from C2 through C5 and severe left foraminal stenosis at C5-6.         Electronically signed by: Jonathan Hill Jr   Date:    05/26/2025   Time:    12:16      MRA Brain without contrast   Final Result      MR angiogram of the head  appears within normal limits.         Electronically signed by: Derrek Vega   Date:    05/26/2025   Time:    10:27      MRI Brain Without Contrast   Final Result   Abnormal      Punctate focus diffusion restriction about the left frontal subcortical white matter, in keeping with a small acute infarct.  No significant surrounding edema or mass effect.  No evidence for acute intracranial hemorrhage.      Sequela of microvascular ischemic change.      This report was flagged in Epic as abnormal.         Electronically signed by: Derrek Vega   Date:    05/26/2025   Time:    10:16      US Lower Extremity Veins Left   Final Result      No evidence of deep venous thrombosis in the left lower extremity.         Electronically signed by: Derrek Vega   Date:    05/26/2025   Time:    08:23      X-Ray Chest AP Portable   Final Result      Bibasilar atelectasis with chronic prominence of the pulmonary vascular markings.         Electronically signed by: Kiko Whitten MD   Date:    05/26/2025   Time:    06:26      X-Ray Cervical Spine 5 View W Flex Extxt    (Results Pending)       ASSESSMENT/PLAN:     Progressive worsening CKD IV-V secondary to CRS, severe vascular disease, mult episodes of GRADY with intravascular depletion with high ostomy output, diuretics, poor CO on midodrine, etc.  Now has transitioned to ESRD:  -followed closely by [facility]  -recently DC'd with Creat 6.3/BUN 79  -referred to vascular and transplant.   -hold diuretics  -Creat 7.8/BUN 90  -was on eliquis but held with planned ROBINSON.  Hep drip off  -bicarb tabs for worsening AGMA (CKD/GI losses) but stop with HD  -5/27:  Ext discussion with pt about RRT options vs Palliative.  Wants to do HD in [place].  Salvaging left arm for AVF/AVG next month.  Consult Dr. Middleton for ROBINSON today.  Start RRT, labs, and consult SW to start HD as outpt.   -see orders.  -Renally dose meds, avoid nephrotoxins, and monitor I/O's closely.        Acute CVA:  -MRI noted.  -defer to neurology.        Multifactorial anemia:  -no RODRIGUE with acute CVA unless cleared by neurology  -recent IV iron  -transfuse if needed.    MBD:  -nephrocaps/binders  -PTH at goal for CKD     Hypotension:  -cont midodrine.     LLE DVT:  -on eliquis     PAF and severe PA-HTN:  -defer     UC w/ ileostomy:  -not a candidate for PD        DISPO:     Multiple comorbidities  Recommend palliative eval        Thanks for consult  See above  Will follow along.        High MDM complexity necessary to treat or prevent imminent or life-threatening deterioration of the following conditions: ESRD  Time spent personally by me on the following activities 75 min: development of treatment plan with patient or surrogate, discussions with consultants, interpretation of cardiac output measurements, examination of patient, ordering and performing treatments and interventions, evaluation of patient's response to treatment, obtaining history from patient or surrogate, ordering and review of laboratory studies, ordering and review of radiographic studies, re-evaluation of patient's condition, pulse oximetry and blood draw for specimens

## 2025-05-27 NOTE — ASSESSMENT & PLAN NOTE
-Nutrition consulted. Most recent weight and BMI monitored-   -Measurements:  Wt Readings from Last 1 Encounters:   05/27/25 78.2 kg (172 lb 6.4 oz)   Body mass index is 22.13 kg/m².

## 2025-05-27 NOTE — PLAN OF CARE
Problem: Adult Inpatient Plan of Care  Goal: Plan of Care Review  Outcome: Progressing  Goal: Patient-Specific Goal (Individualized)  Outcome: Progressing  Goal: Absence of Hospital-Acquired Illness or Injury  Outcome: Progressing  Goal: Optimal Comfort and Wellbeing  Outcome: Progressing     Problem: Sepsis/Septic Shock  Goal: Blood Glucose Level Within Targeted Range  Outcome: Progressing     Problem: Wound  Goal: Absence of Infection Signs and Symptoms  Outcome: Progressing  Goal: Optimal Pain Control and Function  Outcome: Progressing     Problem: Fall Injury Risk  Goal: Absence of Fall and Fall-Related Injury  Outcome: Progressing     POC reviewed with patient. All questions and concerns addressed. Fall/safety precautions implemented and maintained. Urinal provided and within reach. Ileostomy care per patient. Blood glucose monitored. Pain management provided. Heparin gtt in progress. No acute events noted this shift. Please see flowsheet for full assessment and vitals. Bed locked in lowest position. Side rails up x2. Call bell within reach.

## 2025-05-27 NOTE — ASSESSMENT & PLAN NOTE
-On admit he denies any sob despite elevated BNP and peripheral edema.  Denies chest pain.  Troponin negative.  -Echo 2/25/25 showed EF 30%, indeterminate diastolic function, mild LA dilation, mild/moderate MR  -Strict ins/outs and daily weights with low sodium fluid restricted diet  -Continue home aspirin, statin, midodrine and metoprolol.

## 2025-05-27 NOTE — TELEPHONE ENCOUNTER
"----- Message from Margarita sent at 5/27/2025  4:36 PM CDT -----  Regarding: Call back  "Type:  Patient Call BackWho Called:Josselyn([facility])What is the reqeust in detail:Received an PA for pt, but they are miss some information and need to verify. Please adviseCan the clinic reply by [facility]?no Best Call Back Number:[phone] Information:  "

## 2025-05-27 NOTE — PROCEDURES
Radiology Post-Procedure Note    Pre Op Diagnosis: GRADY  Post Op Diagnosis: Same    Procedure: HD catheter placement    Procedure performed by: Jamie Stokes MD    Written Informed Consent Obtained: Yes  Specimen Removed: NO  Estimated Blood Loss: Minimal    Findings:   HD catheter ready for use.    Patient tolerated procedure well.    Jamie Stokes MD

## 2025-05-27 NOTE — PROGRESS NOTES
"Pharmacokinetic Initial Assessment: IV Vancomycin    Assessment/Plan:    Initiate intravenous vancomycin with loading dose of 1500 mg mg once with subsequent doses when random concentrations are less than 25 mcg/mL  Desired empiric serum trough concentration is 15 to 20 mcg/mL  Draw vancomycin random level on 5/29/25 at 0430.  Pharmacy will continue to follow and monitor vancomycin.      Please contact pharmacy at extension [phone] with any questions regarding this assessment.     Thank you for the consult,   Evie iRos       Patient brief summary:  Jarrett Charlton Jr. is a 75 y.o. male initiated on antimicrobial therapy with IV Vancomycin for treatment of suspected bone/joint infection    Drug Allergies:   Review of patient's allergies indicates:   Allergen Reactions    Atorvastatin     Rosuvastatin        Actual Body Weight:   78.2 kg    Renal Function:   Estimated Creatinine Clearance: 8.8 mL/min (A) (based on SCr of 7.9 mg/dL (H)).,     Dialysis Method (if applicable):  intermittent HD    CBC (last 72 hours):  Recent Labs   Lab Result Units 05/26/25  0350 05/26/25  0640 05/27/25  0309   WBC K/uL 6.28 6.79 8.12   HGB gm/dL 8.6* 8.1* 8.1*   HCT % 27.0* 26.0* 25.5*   Platelet Count K/uL 146* 135* 126*   Lymph % % 20.2 11.6* 3.4*   Mono % % 8.9 8.7 3.2*   Eos % % 3.5 2.1 0.0   Basophil % % 0.2 0.1 0.1       Metabolic Panel (last 72 hours):  Recent Labs   Lab Result Units 05/26/25  0350 05/26/25  0640 05/27/25  0309   Sodium mmol/L 140 141 138   Potassium mmol/L 4.2 4.2 4.6   Chloride mmol/L 107 110 106   CO2 mmol/L 19* 16* 15*   Glucose mg/dL 91 102 151*   BUN mg/dL 94* 90* 95*   Creatinine mg/dL 7.8* 7.8* 7.9*   Albumin g/dL 2.9*  --   --    Bilirubin Total mg/dL 0.7  --   --    ALP unit/L 93  --   --    AST unit/L 17  --   --    ALT unit/L 21  --   --    Magnesium  mg/dL 1.7 1.6 1.6   Phosphorus Level mg/dL  --   --  8.3*       Drug levels (last 3 results):  No results for input(s): "VANCOMYCINRA", " ""VANCORANDOM", "VANCOMYCINPE", "VANCOPEAK", "VANCOMYCINTR", "VANCOTROUGH" in the last 72 hours.    Microbiologic Results:  Microbiology Results (last 7 days)       Procedure Component Value Units Date/Time    Culture, Body Fluid (Aerobic) w/ GS [[id number]] Collected: 05/26/25 1908    Order Status: Completed Specimen: Joint Fluid, Right Knee Updated: 05/27/25 0301     GRAM STAIN Few WBC seen      No organisms seen    Fungus culture [[id number]] Collected: 05/26/25 1908    Order Status: Sent Specimen: Joint Fluid, Right Knee Updated: 05/26/25 1920            "

## 2025-05-27 NOTE — PT/OT/SLP PROGRESS
Physical Therapy  Not Seen    Patient Name:  Jarrett Charlton Jr.   MRN:  [id number]    Patient not seen today secondary to Off the floor for procedure/surgery. Will follow-up as schedule allows.

## 2025-05-27 NOTE — PT/OT/SLP PROGRESS
Speech Language Pathology      Jarrett Charlton Jr.  MRN: [id number]    Patient not seen today secondary to pt NPO for procedure this date. Pt scheduled for dialysis this afternoon. Will follow-up later this week pending pt status.

## 2025-05-27 NOTE — ASSESSMENT & PLAN NOTE
-Noted at least 2 falls recently and saw Dr. Patel with neurology a few days ago.  His note was reviewed, noted concern for possible stroke and I ordered his suggested imaging workup.    -Suspect falls and debility are multifactorial.  -MRI brain notable for punctate focus diffusion restriction about the left frontal subcortical white matter, in keeping with a small acute infarct. No significant surrounding edema or mass effect. No evidence for acute intracranial hemorrhage.   -MRA brain was normal  Echocardiogram did not show thrombolytic source.  Evaluated by neurology recommended continuing anticoagulation.    Anticoagulation held for  hemodialysis line placement.  Start heparin tonight.

## 2025-05-28 LAB
ABSOLUTE EOSINOPHIL (OHS): 0 K/UL
ABSOLUTE MONOCYTE (OHS): 0.24 K/UL (ref 0.3–1)
ABSOLUTE NEUTROPHIL COUNT (OHS): 8.45 K/UL (ref 1.8–7.7)
ANION GAP (OHS): 13 MMOL/L (ref 8–16)
APTT PPP: 30.7 SECONDS (ref 21–32)
BASOPHILS # BLD AUTO: 0.01 K/UL
BASOPHILS NFR BLD AUTO: 0.1 %
BUN SERPL-MCNC: 65 MG/DL (ref 8–23)
CALCIUM SERPL-MCNC: 8.1 MG/DL (ref 8.7–10.5)
CHLORIDE SERPL-SCNC: 103 MMOL/L (ref 95–110)
CO2 SERPL-SCNC: 20 MMOL/L (ref 23–29)
CREAT SERPL-MCNC: 5.3 MG/DL (ref 0.5–1.4)
ERYTHROCYTE [DISTWIDTH] IN BLOOD BY AUTOMATED COUNT: 17.6 % (ref 11.5–14.5)
GFR SERPLBLD CREATININE-BSD FMLA CKD-EPI: 11 ML/MIN/1.73/M2
GLUCOSE SERPL-MCNC: 202 MG/DL (ref 70–110)
HCT VFR BLD AUTO: 24.4 % (ref 40–54)
HGB BLD-MCNC: 7.6 GM/DL (ref 14–18)
IMM GRANULOCYTES # BLD AUTO: 0.06 K/UL (ref 0–0.04)
IMM GRANULOCYTES NFR BLD AUTO: 0.7 % (ref 0–0.5)
LYMPHOCYTES # BLD AUTO: 0.28 K/UL (ref 1–4.8)
MAGNESIUM SERPL-MCNC: 1.8 MG/DL (ref 1.6–2.6)
MCH RBC QN AUTO: 30.4 PG (ref 27–31)
MCHC RBC AUTO-ENTMCNC: 31.1 G/DL (ref 32–36)
MCV RBC AUTO: 98 FL (ref 82–98)
NUCLEATED RBC (/100WBC) (OHS): 0 /100 WBC
PHOSPHATE SERPL-MCNC: 6.8 MG/DL (ref 2.7–4.5)
PLATELET # BLD AUTO: 128 K/UL (ref 150–450)
PMV BLD AUTO: 9.8 FL (ref 9.2–12.9)
POCT GLUCOSE: 185 MG/DL (ref 70–110)
POCT GLUCOSE: 218 MG/DL (ref 70–110)
POCT GLUCOSE: 258 MG/DL (ref 70–110)
POCT GLUCOSE: 273 MG/DL (ref 70–110)
POTASSIUM SERPL-SCNC: 4.7 MMOL/L (ref 3.5–5.1)
RBC # BLD AUTO: 2.5 M/UL (ref 4.6–6.2)
RELATIVE EOSINOPHIL (OHS): 0 %
RELATIVE LYMPHOCYTE (OHS): 3.1 % (ref 18–48)
RELATIVE MONOCYTE (OHS): 2.7 % (ref 4–15)
RELATIVE NEUTROPHIL (OHS): 93.4 % (ref 38–73)
SODIUM SERPL-SCNC: 136 MMOL/L (ref 136–145)
WBC # BLD AUTO: 9.04 K/UL (ref 3.9–12.7)

## 2025-05-28 PROCEDURE — 94761 N-INVAS EAR/PLS OXIMETRY MLT: CPT

## 2025-05-28 PROCEDURE — 36415 COLL VENOUS BLD VENIPUNCTURE: CPT | Performed by: HOSPITALIST

## 2025-05-28 PROCEDURE — 97165 OT EVAL LOW COMPLEX 30 MIN: CPT

## 2025-05-28 PROCEDURE — 83735 ASSAY OF MAGNESIUM: CPT | Performed by: HOSPITALIST

## 2025-05-28 PROCEDURE — 63600175 PHARM REV CODE 636 W HCPCS: Performed by: HOSPITALIST

## 2025-05-28 PROCEDURE — 80100016 HC MAINTENANCE HEMODIALYSIS

## 2025-05-28 PROCEDURE — 84100 ASSAY OF PHOSPHORUS: CPT | Performed by: HOSPITALIST

## 2025-05-28 PROCEDURE — 63600175 PHARM REV CODE 636 W HCPCS: Performed by: NURSE PRACTITIONER

## 2025-05-28 PROCEDURE — 21400001 HC TELEMETRY ROOM

## 2025-05-28 PROCEDURE — 97530 THERAPEUTIC ACTIVITIES: CPT

## 2025-05-28 PROCEDURE — 97161 PT EVAL LOW COMPLEX 20 MIN: CPT

## 2025-05-28 PROCEDURE — 25000003 PHARM REV CODE 250: Performed by: HOSPITALIST

## 2025-05-28 PROCEDURE — 97166 OT EVAL MOD COMPLEX 45 MIN: CPT

## 2025-05-28 PROCEDURE — A4216 STERILE WATER/SALINE, 10 ML: HCPCS | Performed by: HOSPITALIST

## 2025-05-28 PROCEDURE — 25000003 PHARM REV CODE 250: Performed by: NURSE PRACTITIONER

## 2025-05-28 PROCEDURE — 85730 THROMBOPLASTIN TIME PARTIAL: CPT | Performed by: HOSPITALIST

## 2025-05-28 PROCEDURE — 85025 COMPLETE CBC W/AUTO DIFF WBC: CPT | Performed by: NURSE PRACTITIONER

## 2025-05-28 PROCEDURE — 80048 BASIC METABOLIC PNL TOTAL CA: CPT | Performed by: HOSPITALIST

## 2025-05-28 RX ORDER — HEPARIN SODIUM 1000 [USP'U]/ML
4000 INJECTION, SOLUTION INTRAVENOUS; SUBCUTANEOUS
Status: DISCONTINUED | OUTPATIENT
Start: 2025-05-28 | End: 2025-06-04 | Stop reason: HOSPADM

## 2025-05-28 RX ADMIN — MORPHINE SULFATE 2 MG: 2 INJECTION, SOLUTION INTRAMUSCULAR; INTRAVENOUS at 06:05

## 2025-05-28 RX ADMIN — MIDODRINE HYDROCHLORIDE 10 MG: 5 TABLET ORAL at 01:05

## 2025-05-28 RX ADMIN — DEXAMETHASONE SODIUM PHOSPHATE 10 MG: 10 INJECTION INTRAMUSCULAR; INTRAVENOUS at 01:05

## 2025-05-28 RX ADMIN — BRIMONIDINE TARTRATE 1 DROP: 1.5 SOLUTION/ DROPS OPHTHALMIC at 08:05

## 2025-05-28 RX ADMIN — MUPIROCIN: 20 OINTMENT TOPICAL at 08:05

## 2025-05-28 RX ADMIN — CEFEPIME 1 G: 1 INJECTION, POWDER, FOR SOLUTION INTRAMUSCULAR; INTRAVENOUS at 05:05

## 2025-05-28 RX ADMIN — OXYCODONE HYDROCHLORIDE 5 MG: 5 TABLET ORAL at 01:05

## 2025-05-28 RX ADMIN — INSULIN ASPART 4 UNITS: 100 INJECTION, SOLUTION INTRAVENOUS; SUBCUTANEOUS at 08:05

## 2025-05-28 RX ADMIN — Medication 10 ML: at 01:05

## 2025-05-28 RX ADMIN — SEVELAMER CARBONATE 800 MG: 800 TABLET, FILM COATED ORAL at 05:05

## 2025-05-28 RX ADMIN — INSULIN ASPART 3 UNITS: 100 INJECTION, SOLUTION INTRAVENOUS; SUBCUTANEOUS at 09:05

## 2025-05-28 RX ADMIN — TAMSULOSIN HYDROCHLORIDE 0.4 MG: 0.4 CAPSULE ORAL at 08:05

## 2025-05-28 RX ADMIN — INSULIN ASPART 6 UNITS: 100 INJECTION, SOLUTION INTRAVENOUS; SUBCUTANEOUS at 07:05

## 2025-05-28 RX ADMIN — DEXAMETHASONE SODIUM PHOSPHATE 10 MG: 10 INJECTION INTRAMUSCULAR; INTRAVENOUS at 06:05

## 2025-05-28 RX ADMIN — SEVELAMER CARBONATE 800 MG: 800 TABLET, FILM COATED ORAL at 08:05

## 2025-05-28 RX ADMIN — PRAVASTATIN SODIUM 40 MG: 20 TABLET ORAL at 08:05

## 2025-05-28 RX ADMIN — FERROUS SULFATE TAB 325 MG (65 MG ELEMENTAL FE) 1 EACH: 325 (65 FE) TAB at 08:05

## 2025-05-28 RX ADMIN — PANTOPRAZOLE SODIUM 40 MG: 40 TABLET, DELAYED RELEASE ORAL at 08:05

## 2025-05-28 RX ADMIN — LEVOTHYROXINE SODIUM 25 MCG: 25 TABLET ORAL at 06:05

## 2025-05-28 RX ADMIN — HEPARIN SODIUM 4000 UNITS: 1000 INJECTION, SOLUTION INTRAVENOUS; SUBCUTANEOUS at 01:05

## 2025-05-28 RX ADMIN — AMIODARONE HYDROCHLORIDE 200 MG: 200 TABLET ORAL at 08:05

## 2025-05-28 RX ADMIN — MIDODRINE HYDROCHLORIDE 10 MG: 5 TABLET ORAL at 05:05

## 2025-05-28 RX ADMIN — Medication 10 ML: at 09:05

## 2025-05-28 RX ADMIN — BRIMONIDINE TARTRATE 1 DROP: 1.5 SOLUTION/ DROPS OPHTHALMIC at 09:05

## 2025-05-28 RX ADMIN — DEXAMETHASONE SODIUM PHOSPHATE 10 MG: 10 INJECTION INTRAMUSCULAR; INTRAVENOUS at 09:05

## 2025-05-28 RX ADMIN — SEVELAMER CARBONATE 800 MG: 800 TABLET, FILM COATED ORAL at 01:05

## 2025-05-28 RX ADMIN — MIDODRINE HYDROCHLORIDE 10 MG: 5 TABLET ORAL at 08:05

## 2025-05-28 RX ADMIN — GABAPENTIN 200 MG: 100 CAPSULE ORAL at 09:05

## 2025-05-28 RX ADMIN — LIDOCAINE 1 PATCH: 50 PATCH CUTANEOUS at 08:05

## 2025-05-28 RX ADMIN — MUPIROCIN: 20 OINTMENT TOPICAL at 09:05

## 2025-05-28 RX ADMIN — OXYCODONE HYDROCHLORIDE 5 MG: 5 TABLET ORAL at 05:05

## 2025-05-28 RX ADMIN — ASCORBIC ACID, THIAMINE MONONITRATE,RIBOFLAVIN, NIACINAMIDE, PYRIDOXINE HYDROCHLORIDE, FOLIC ACID, CYANOCOBALAMIN, BIOTIN, CALCIUM PANTOTHENATE, 1 CAPSULE: 100; 1.5; 1.7; 20; 10; 1; 6000; 150000; 5 CAPSULE, LIQUID FILLED ORAL at 08:05

## 2025-05-28 RX ADMIN — METOPROLOL SUCCINATE 12.5 MG: 25 TABLET, EXTENDED RELEASE ORAL at 01:05

## 2025-05-28 RX ADMIN — Medication 10 ML: at 06:05

## 2025-05-28 RX ADMIN — OXYCODONE HYDROCHLORIDE 5 MG: 5 TABLET ORAL at 08:05

## 2025-05-28 NOTE — ASSESSMENT & PLAN NOTE
Patient presents with significant worsening debility from baseline. MRI C-spine shows severe degenerative changes producing severe foraminal and central canal stenosis from C2 through C5 and severe left foraminal stenosis at C5-6.  MRI soft tissue neck shows no soft tissue mass or lymphadenopathy throughout the soft tissues of the neck.     Dr. Adan Santiago (Spine Orthopedic Surgery) evaluated patient and recommended high-dose steroids to treat spinal stenosis along with therapy.    Patient also underwent an arthrocentesis of his right knee.  Patient with 50,000 white cells with neutrophil predominance.  Gram stain negative.   Dr. Merrill suspect infusion likely inflammatory and not likely a septic joint.   Cultures submitted and started on empiric antibiotic therapy pending culture results.

## 2025-05-28 NOTE — PT/OT/SLP PROGRESS
Physical Therapy  Not Seen    Patient Name:  Jarrett Charlton Jr.   MRN:  [id number]    Patient not seen today secondary to Dialysis. Will follow-up tomorrow, 5/29.

## 2025-05-28 NOTE — PLAN OF CARE
Problem: Occupational Therapy  Goal: Occupational Therapy Goal  Description: Goals to be met by: 6/21/25     Patient will increase functional independence with ADLs by performing:    UB dressing at Supervision level, including retrieval of clothing.   LB dressing at Supervision level, including retrieval of clothing.   Toileting at Supervision level.   Grooming standing at sink at Supervision level.   Toilet transfer at Supervision level.   Sponge bathing at Supervision  level.     Outcome: Progressing

## 2025-05-28 NOTE — PT/OT/SLP PROGRESS
Occupational Therapy      Patient Name:  Jarrett Charlton Jr.   MRN:  [id number]    Patient not seen today secondary to Dialysis. Will follow-up later today as schedule permits.    5/28/2025

## 2025-05-28 NOTE — PROGRESS NOTES
Active pharmacy to dose vancomycin consult:    Patient reviewed, renal function stable, cultures reviewed, no new levels, continue current therapy; Next level due: 5/29 at 0430    Pharmacy will continue to follow and monitor vancomycin.    Please contact pharmacy at extension [phone] with any questions regarding this assessment.     Thank you for the consult,   Pricilla Ojeda    Detail Level: Simple Plan: Avoid picking the area Otc Regimen: Aquaphor- Keep area moisturized\\nSunscreen with zinc oxide Initiate Treatment: Valtrex -Outbreak: take 2 tablets in the morning 2 tablets at night x 1 day at first sign of outbreak\\nSuppression: Take 500mg once daily for 4 months then follow up\\n\\nAlclometasone to affected area x 2 weeks only when flared\\n

## 2025-05-28 NOTE — PT/OT/SLP EVAL
Occupational Therapy   Evaluation    Name: Jarrett Charlton Jr.  MRN: [id number]  Admitting Diagnosis: Cervical spinal stenosis  Recent Surgery: Procedure(s) (LRB):  Insertion,catheter,tunneled (N/A) 1 Day Post-Op    Recommendations:     Discharge Recommendations: Low Intensity Therapy (Assist from daughter)  Discharge Equipment Recommendations:   (TBD)  Barriers to discharge:  Decreased caregiver support (Current functional level)    Assessment:     Jarrett Charlton Jr. is a 75 y.o. male with a medical diagnosis of Cervical spinal stenosis.  He presents with the following performance deficits affecting function: weakness, impaired endurance, impaired self care skills, impaired functional mobility, gait instability, impaired balance, decreased ROM, decreased safety awareness, decreased lower extremity function, decreased upper extremity function, impaired skin, impaired cardiopulmonary response to activity, pain, decreased coordination.      Rehab Prognosis: Fair/Good to return to PLOF; patient would benefit from acute skilled OT services to address these deficits and reach maximum level of function.       Plan:     Patient to be seen 5 x/week to address the above listed problems via self-care/home management, therapeutic activities, therapeutic exercises  Plan of Care Expires: 06/27/25  Plan of Care Reviewed with: patient    Subjective     Chief Complaint: Right knee pain  Patient/Family Comments/goals: Pt stating his pain is better after getting up and moving around but would like more pain medication if available.  Pt's nurse notified.     Occupational Profile:  Living Environment: Lives in home with daughter  Previous level of function: Mod I mostly, occasional assist from daughter if needed  Roles and Routines: Father, grandfather  Equipment Used at Home: cane, straight, wheelchair, walker, rolling  Assistance upon Discharge: Daughter, works at [employer].  Unsure if pt will have 24/7  assist    Pain/Comfort:  Pain Rating 1: 10/10  Location - Side 1: Right  Location 1: knee  Pain Addressed 1: Pre-medicate for activity, Reposition, Distraction, Nurse notified    Patients cultural, spiritual, Tenriism conflicts given the current situation: no    Objective:     Communicated with: nurse prior to session.  Patient found HOB elevated with peripheral IV, telemetry upon OT entry to room.    General Precautions: Standard, aspiration, diabetic, fall (Ileostomy)  Orthopedic Precautions: N/A  Braces: N/A  Respiratory Status: Room air    Occupational Performance:    Bed Mobility:    Supine to sit: Mod A, assist with right LE due to pain  Sit to supine: Min A    Functional Mobility/Transfers:  socks and gait belt  Sit to stand: CGA with RW  Transfers: CGA with RW  Functional Mobility: CGA with RW in room, slow pace    Activities of Daily Living:  UB Dressing: Min A  LB Dressing: Max A   Toileting: Using urinal in bed, okay to use BSC or toilet with assist of nursing staff       Cognitive/Visual Perceptual:  Cognitive/Psychosocial Skills:     -       Oriented to: Person, Place, Time, and Situation   -       Follows Commands/attention:Follows one-step commands  -       Communication: clear/fluent  -       Memory: No Deficits noted  -       Safety awareness/insight to disability: Fair   -       Mood/Affect/Coping skills/emotional control: Cooperative and Pleasant    Physical Exam:  Right UE Function: Limited right shoulder function due to rotator cuff tear, per pt report.   4/5  Left UE Function: AROM is WFL for self care tasks, Strength is grossly 4/5, Coordination is WFL for self care tasks  Sitting Balance: Good sitting EOB for static sitting  Standing Balance: Fair, needing bilateral UE support from RW    [facility] 6 Click ADL:  AMPAC Total Score: 15    Treatment & Education:  Role of OT, POC, ADL and ADL mobility training and safety, discharge recs    Patient left HOB and FOB elevated, bed extender  placed on FOB with all lines intact, call button in reach, bed alarm on, and nurse notified    GOALS:   Multidisciplinary Problems       Occupational Therapy Goals          Problem: Occupational Therapy    Goal Priority Disciplines Outcome Interventions   Occupational Therapy Goal     OT, PT/OT Progressing    Description: Goals to be met by: 6/21/25     Patient will increase functional independence with ADLs by performing:    UB dressing at Supervision level, including retrieval of clothing.   LB dressing at Supervision level, including retrieval of clothing.   Toileting at Supervision level.   Grooming standing at sink at Supervision level.   Toilet transfer at Supervision level.   Sponge bathing at Supervision  level.                          DME Justifications:  No DME recommended requiring DME justifications    History:     Past Medical History:   Diagnosis Date    Autonomic dysfunction 11/2023 11/2023 neuro note    Basal cell carcinoma     BPH (benign prostatic hyperplasia)     s/p TURP    CAD (coronary artery disease) 2017    status post PCI to mid LAD in 2017 (LHC in 2021 showed patent stent, otherwise nonobstructive CAD)    Cancer of prostate     s/p TURP    Chronic kidney disease     DDD (degenerative disc disease) 10/21/2013    Diabetes mellitus with renal complications     Disc disease, degenerative, cervical     DVT (deep venous thrombosis)     Encounter for blood transfusion     GERD (gastroesophageal reflux disease)     History of ulcerative colitis 1982    s/p colectomy and ileostomy    HLD (hyperlipidemia)     Ileostomy in place 1982    RBBB     Squamous cell carcinoma 03/08/2018    Left superior helix near insertion    Squamous cell carcinoma 04/12/2018    Left forearm x 5    Ventricular tachycardia          Past Surgical History:   Procedure Laterality Date    ABLATION, SVT, ACCESSORY PATHWAY N/A 04/30/2024    Procedure: Ablation, SVT, Accessory Pathway;  Surgeon: Franky Taylor MD;  Location:  [facility] EP LAB;  Service: Cardiology;  Laterality: N/A;  VT, RFA, Carto, MAC, GP, 3 Prep *MDT ILR in situ*    cardiac stents      CATARACT EXTRACTION Bilateral     CERVICAL FUSION      CHOLECYSTECTOMY N/A 02/14/2023    Procedure: CHOLECYSTECTOMY;  Surgeon: Mike Joyce MD;  Location: [facility] OR;  Service: General;  Laterality: N/A;  very high probabilty of converison to open    colectomy and ileostomy  1985    EGD, WITH CLOSED BIOPSY  04/04/2025    ENDOSCOPIC ULTRASOUND OF UPPER GASTROINTESTINAL TRACT N/A 02/10/2023    Procedure: ULTRASOUND, UPPER GI TRACT, ENDOSCOPIC;  Surgeon: Poli Fuller MD;  Location: [facility] ENDO;  Service: Endoscopy;  Laterality: N/A;    ERCP N/A 02/10/2023    Procedure: ERCP (ENDOSCOPIC RETROGRADE CHOLANGIOPANCREATOGRAPHY);  Surgeon: Poli Fuller MD;  Location: [facility] ENDO;  Service: Endoscopy;  Laterality: N/A;    ESOPHAGOGASTRODUODENOSCOPY N/A 4/4/2025    Procedure: EGD (ESOPHAGOGASTRODUODENOSCOPY);  Surgeon: Melania Gibson MD;  Location: [facility];  Service: Endoscopy;  Laterality: N/A;    EXCISION OF PAROTID GLAND Left 12/18/2020    Procedure: EXCISION, PAROTID GLAND;  Surgeon: Michael Pinzon MD;  Location: [address];  Service: ENT;  Laterality: Left;    INSERTION OF IMPLANTABLE LOOP RECORDER  06/07/2021    INSERTION OF IMPLANTABLE LOOP RECORDER Left 06/07/2021    Procedure: INSERTION, IMPLANTABLE LOOP RECORDER;  Surgeon: Romario Dao MD;  Location: [facility] CATH LAB;  Service: Cardiology;  Laterality: Left;    INSERTION, CATHETER, TUNNELED N/A 5/27/2025    Procedure: Insertion,catheter,tunneled;  Surgeon: Jamie Stokes MD;  Location: [facility] CATH LAB;  Service: Radiology;  Laterality: N/A;    LEFT HEART CATHETERIZATION Right 04/15/2021    Procedure: CATHETERIZATION, HEART, LEFT;  Surgeon: Marcio Jones MD;  Location: [facility] CATH LAB;  Service: Cardiology;  Laterality: Right;    LYSIS OF ADHESIONS N/A 11/09/2020    Procedure: LYSIS, ADHESIONS,  ERAS low;  Surgeon: CED  Homero Haley MD;  Location: [facility] OR 2ND FLR;  Service: Colon and Rectal;  Laterality: N/A;    LYSIS OF ADHESIONS N/A 02/14/2023    Procedure: LYSIS, ADHESIONS;  Surgeon: Mike Joyce MD;  Location: [facility];  Service: General;  Laterality: N/A;    POUCHOSCOPY N/A 04/06/2022    Procedure: ENDOSCOPY, POUCH, SMALL INTESTINE, DIAGNOSTIC;  Surgeon: Dieter Juarez MD;  Location: [facility] ENDO (2ND FLR);  Service: Endoscopy;  Laterality: N/A;    REPAIR, HERNIA, PARASTOMAL N/A 11/09/2020    Procedure: REPAIR, HERNIA, PARASTOMAL;  Surgeon: CED Haley MD;  Location: [facility] OR 2ND FLR;  Service: Colon and Rectal;  Laterality: N/A;    TRANSURETHRAL RESECTION OF PROSTATE (TURP) WITHOUT USE OF LASER N/A 01/23/2019    Procedure: TURP, WITHOUT USING LASER BIPOLAR;  Surgeon: Catarino Mota MD;  Location: [facility] 1ST FLR;  Service: Urology;  Laterality: N/A;  1.5 HOURS    TREATMENT OF CARDIAC ARRHYTHMIA  04/30/2024    Procedure: Cardioversion or Defibrillation;  Surgeon: Franky Taylor MD;  Location: [facility] EP LAB;  Service: Cardiology;;       Time Tracking:     OT Date of Treatment: 05/28/25  OT Start Time: 1715  OT Stop Time: 1740  OT Total Time (min): 25 min    Billable Minutes:Evaluation 25 5/28/2025

## 2025-05-28 NOTE — ASSESSMENT & PLAN NOTE
Doppler US of left leg without any evidence of DVT presently.    Anticoagulation held for tunneled line placement.. Restart heparin infusion last night but had to be discontinued to bleeding from his catheter site.  Continue to hold anticoagulation and antiplatelet therapy pending resolution of bleeding.

## 2025-05-28 NOTE — PROGRESS NOTES
05/27/25 1950        Hemodialysis Catheter 05/27/25 right subclavian   Placement Date: 05/27/25   Present Prior to Hospital Arrival?: No  Location: right subclavian   Line Necessity Review CRRT/HD;Longterm central access required   Site Assessment Bleeding   Line Securement Device Secured with sutures   Dressing Type Central line dressing   Dressing Intervention Sterile dressing change   Date on Dressing 05/27/25   Dressing Due to be Changed 05/28/25

## 2025-05-28 NOTE — ASSESSMENT & PLAN NOTE
-On admit he denies any sob despite elevated BNP and peripheral edema.  Denies chest pain.  Troponin negative.  -Echo 2/25/25 showed EF 30%, indeterminate diastolic function, mild LA dilation, mild/moderate MR  -Strict ins/outs and daily weights with low sodium fluid restricted diet  -Continue statin, midodrine and metoprolol.  Holding aspirin due to bleeding.

## 2025-05-28 NOTE — PLAN OF CARE
Case Management Assessment     PCP: Olga Hampton MD  Pharmacy: [facility], listed below    Patient Arrived From: home, physical address [address] [place] [address]  Existing Help at Home:     Latisha Charlton (Daughter)  [phone] (Mobile)       Barriers to Discharge: none    Discharge Plan:    A. Home with family   B. Home with family      Patient is AAOx3 and ambulates with equipment, completes ADLs independently. PCP correct on facesheet. Owns walker, cane and wheelchair as DME. Patient's daughter will transport patient home at discharge.      [facility] ([address])  Initial Discharge Assessment       Primary Care Provider: Olga Hampton MD    Admission Diagnosis: Paroxysmal atrial fibrillation [I48.0]  Leg swelling [M79.89]  Acute ischemic stroke [I63.9]  Chest pain [R07.9]    Admission Date: 5/26/2025  Expected Discharge Date: 5/30/2025    Transition of Care Barriers: None    Payor: [employer] MANAGED MEDICARE / Plan: [employer] MEDICARE HMO / Product Type: Capitation /     Extended Emergency Contact Information  Primary Emergency Contact: Latisha Charlton  Home Phone: [phone]  Mobile Phone: [phone]  Relation: Daughter    Discharge Plan A: Home with family  Discharge Plan B: Home with family      [facility] [address] [place] - [address] [facility]  [address]  [place] [address]  Phone: [phone] Fax: [phone]    [facility] - [facility] [address] [facility]  [address] [facility] [address]  Phone: [phone] Fax: [phone]      Initial Assessment (most recent)       Adult Discharge Assessment - 05/28/25 0942          Discharge Assessment    Assessment Type Discharge Planning Assessment     Confirmed/corrected address, phone number and insurance Yes     Confirmed Demographics Correct on Facesheet     Source of Information patient     Communicated JOSÉ MIGUEL with patient/caregiver Date not available/Unable  to determine     People in Home grandchild(eladio);child(eladio), adult     Name(s) of People in Home Latisha Charlton (Daughter)  [phone] (Mobile)     Do you expect to return to your current living situation? Yes     Do you have help at home or someone to help you manage your care at home? Yes     Who are your caregiver(s) and their phone number(s)? Latisha Charlton (Daughter)  [phone] (Mobile)     Prior to hospitilization cognitive status: Alert/Oriented     Current cognitive status: Alert/Oriented     Walking or Climbing Stairs Difficulty yes     Walking or Climbing Stairs ambulation difficulty, requires equipment     Mobility Management rolling walker     Dressing/Bathing Difficulty no     Equipment Currently Used at Home wheelchair;walker, rolling;cane, straight     Readmission within 30 days? Yes     Do you currently have service(s) that help you manage your care at home? No     Do you take prescription medications? No     Do you have prescription coverage? Yes     Do you have any problems affording any of your prescribed medications? No     Is the patient taking medications as prescribed? yes     Who is going to help you get home at discharge? Latisha Charlton (Daughter)  [phone] (Mobile)     How do you get to doctors appointments? car, drives self;family or friend will provide     Are you on dialysis? Yes     Dialysis Name and Scheduled days was supposed to start in July so is unsure of schedule     Do you take coumadin? No     Discharge Plan A Home with family     Discharge Plan B Home with family     DME Needed Upon Discharge  none     Discharge Plan discussed with: Patient     Transition of Care Barriers None

## 2025-05-28 NOTE — ASSESSMENT & PLAN NOTE
-Noted history - ?pAfib? And plans for ablation at [facility] 7/15/25.  -Continue home amiodarone and metoprolol  Holding anticoagulation due to bleeding.

## 2025-05-28 NOTE — PROGRESS NOTES
"Nephrology  Progress Note    Admit Date: 5/26/2025   LOS: 1 day     SUBJECTIVE:     Follow-up For:  GRADY    History of Present Illness:  Patient is a 75 y.o. male presents with neck/shoulder pain.  W/up in process and preliminarily MRI with acute left frontal infarct.  Labs significant for worsening GRADY/CKD (Creat 7.8, BUN 94, BNP 1253).  Pt with extensive med hx as outlined below and known to me from recent admission.  Significant renal disease and followed by [facility].  Has had rapid progression of CKD over past year with creat steadily rising 3-4-5-6-7.  Has been referred to vascular for AVF and transplant.  Seen after multiple testings.  Looks older than stated age,  frail, etc.  C/o pain in neck, head.       EPIC reviewed.  Case discussed with team.          Interval History:     S/P ROBINSON cath and HD yesterday.  Had some bleeding from site and now with pressure dressing.  More awake/alert today.  Discussed with team.     Review of Systems:  Constitutional: No fever or chills  Respiratory: No cough or shortness of breath  Cardiovascular: No chest pain or palpitations  Gastrointestinal: No nausea or vomiting  Neurological: No confusion or weakness    OBJECTIVE:     Vital Signs Range (Last 24H):  /60 (BP Location: Right arm, Patient Position: Lying)   Pulse (!) 56   Temp 97.5 °F (36.4 °C) (Oral)   Resp 18   Ht 6' 2" (1.88 m)   Wt 78.2 kg (172 lb 6.4 oz)   SpO2 98%   BMI 22.13 kg/m²     Temp:  [97.5 °F (36.4 °C)-97.8 °F (36.6 °C)]   Pulse:  []   Resp:  [16-18]   BP: (123-169)/(59-75)   SpO2:  [93 %-99 %]     I & O (Last 24H):  Intake/Output Summary (Last 24 hours) at 5/28/2025 0978  Last data filed at 5/28/2025 0913  Gross per 24 hour   Intake 173.3 ml   Output 500 ml   Net -326.7 ml       Physical Exam:  General appearance:  NAD this am.  Eyes:  Conjunctivae/corneas clear. PERRL.  Lungs: Normal respiratory effort,   clear to auscultation bilaterally   Heart: Regular rate and rhythm, S1, " S2 normal, 3/6 murmur  Abdomen: Soft, nontender, ostomy + bowel sounds  Extremities: No cyanosis or clubbing.  trace+ edema.    Skin: Skin color pale, texture, turgor normal. No rashes or lesions  Neurological:  nonfocal  RIJ ROBINSON.  Old blood noted.  No oozing at present.     Laboratory Data:  Recent Labs   Lab 05/28/25  0351   WBC 9.04   RBC 2.50*   HGB 7.6*   HCT 24.4*   *   MCV 98   MCH 30.4   MCHC 31.1*       BMP:   Recent Labs   Lab 05/28/25  0351   *      K 4.7      CO2 20*   BUN 65*   CREATININE 5.3*   CALCIUM 8.1*   MG 1.8   PHOS 6.8*     Lab Results   Component Value Date    CALCIUM 8.1 (L) 05/28/2025    PHOS 6.8 (H) 05/28/2025       Lab Results   Component Value Date    .4 (H) 05/16/2025    CALCIUM 8.1 (L) 05/28/2025    PHOS 6.8 (H) 05/28/2025       Lab Results   Component Value Date    URICACID 7.9 (H) 10/10/2024       BNP  Recent Labs   Lab 05/26/25  0350   BNP 1,253*       Medications:  Medication list was reviewed and changes noted under Assessment/Plan.    Diagnostic Results:    IR Tunneled Catheter Insert w/o Port   Final Result      Insertion of right -sided supradiaphragmatic dual- lumen tunneled hemodialysis catheter, with tip in the expected location of the cavoatrial junction.      Plan:      The catheter may be used immediately.      _______________________________________________________________         Electronically signed by: Jamie Stokes MD   Date:    05/27/2025   Time:    13:24      X-Ray Cervical Spine 5 View W Flex Extxt   Final Result      Chronic change as above.         Electronically signed by: Kurt Bustillo MD   Date:    05/27/2025   Time:    07:46      X-Ray Hip 2 or 3 views Left with Pelvis when performed   Final Result      Please see above.         Electronically signed by: Derrek Vega   Date:    05/26/2025   Time:    14:01      US Carotid Bilateral   Final Result      No evidence of a hemodynamically significant carotid bifurcation stenosis.          Electronically signed by: Derrek Vega   Date:    05/26/2025   Time:    11:23      MRI Soft Tissue Neck Without Contrast   Final Result      No soft tissue mass or lymphadenopathy throughout the soft tissues of the neck.         Electronically signed by: Derrek Vega   Date:    05/26/2025   Time:    13:11      MRI Cervical Spine Without Contrast   Final Result      Severe degenerative changes as described producing severe foraminal and central canal stenosis from C2 through C5 and severe left foraminal stenosis at C5-6.         Electronically signed by: Jonathan Hill Jr   Date:    05/26/2025   Time:    12:16      MRA Brain without contrast   Final Result      MR angiogram of the head appears within normal limits.         Electronically signed by: Derrek Vega   Date:    05/26/2025   Time:    10:27      MRI Brain Without Contrast   Final Result   Abnormal      Punctate focus diffusion restriction about the left frontal subcortical white matter, in keeping with a small acute infarct.  No significant surrounding edema or mass effect.  No evidence for acute intracranial hemorrhage.      Sequela of microvascular ischemic change.      This report was flagged in Epic as abnormal.         Electronically signed by: Derrek Vega   Date:    05/26/2025   Time:    10:16      US Lower Extremity Veins Left   Final Result      No evidence of deep venous thrombosis in the left lower extremity.         Electronically signed by: Derrek Vega   Date:    05/26/2025   Time:    08:23      X-Ray Chest AP Portable   Final Result      Bibasilar atelectasis with chronic prominence of the pulmonary vascular markings.         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  Now has transitioned  to ESRD:  [place] [facility] [place]recently DC'd with Creat 6.3/BUN 79  -referred to vascular and transplant.   -hold diuretics  -Creat 7.8/BUN 90  -was on eliquis but held with planned ROBINSON.  Hep drip off  -bicarb tabs for worsening AGMA (CKD/GI losses) but stop with HD  -5/27:  Ext discussion with pt about RRT options vs Palliative.  Wants to do HD in [place].  Salvaging left arm for AVF/AVG next month.  Consulted Dr. Middleton for ROBINSON today but IR had to place.  Start RRT, labs, and consult SW to start HD as outpt.   -5/28:  continue with gentle HD X 3 treatments and then transition to 3/week.  Monitor ROBINSON site as may need stitch.    -see orders.  -Renally dose meds, avoid nephrotoxins, and monitor I/O's closely.      Acute CVA:  -MRI noted.  -defer to neurology.      Spinal Stenosis:  -on decadron.   -defer     Multifactorial anemia:  -no RODRIGUE with acute CVA unless cleared by neurology  -recent IV iron  -transfuse if needed.    MBD:  -nephrocaps/binders  -PTH at goal for CKD     Hypotension:  -cont midodrine.     LLE DVT:  -on eliquis but held with bleeding.      PAF and severe PA-HTN:  -defer     UC w/ ileostomy:  -not a candidate for PD        DISPO:     Multiple comorbidities  DNR        Thanks for consult  See above  Will follow along.        High MDM complexity necessary to treat or prevent imminent or life-threatening deterioration of the following conditions: ESRD  Time spent personally by me on the following activities 75 min: development of treatment plan with patient or surrogate, discussions with consultants, interpretation of cardiac output measurements, examination of patient, ordering and performing treatments and interventions, evaluation of patient's response to treatment, obtaining history from patient or surrogate, ordering and review of laboratory studies, ordering and review of radiographic studies, re-evaluation of patient's condition, pulse oximetry and blood draw for specimens

## 2025-05-28 NOTE — PROGRESS NOTES
"[address] Medicine  Progress Note    Patient Name: Jarrett Charlton Jr.  MRN: [id number]  Patient Class: IP- Inpatient   Admission Date: 5/26/2025  Length of Stay: 1 days  Attending Physician: Darrell Cheema MD  Primary Care Provider: Olga Hampton MD        Subjective     Principal Problem:Cervical spinal stenosis        HPI:  As per Poli Damon MD:    "Mr. Charlton is a 75 year old man with autonomic dysfunction on midodrine, coronary artery disease, progressing CKDV, BPH prostate cancer s/p TURP, BPH, cervical disk disease (s/p spinal fusion in the 80s), ulcerative colitis (s/p colectomy and illiostomy, ventricular tachycardia / ?atrial fibrillation s/p ablation who presented for evaluation of severe pain in his right shoulder / neck and left groin.  History is obtained by patient and his daughter at bedside.  They note a fall several months ago with abrasion to his lumbar back and shins.  He also had a fall 3 days ago which he states occurred when he was turning and tripped over his shoes.  He didn't hit his head, but did fall onto his right hand/arm.  He had no severe pain at that time.  He states that yesterday he woke up feeling ok, but as the day progressed he developed severe left inner groin pain.  The pain has been progressively worse throughout the day and is exacerbated by walking and laying down.  He also reports developing severe pain in his neck starting at the posterior base of his cranium and radiating down to his right shoulder.  The pain is severe and also worse with laying flat.  He describes the neck/shoulder pain as throbbing and intense.  He reports baseline dyspnea on exertion which is stable.  He denies fevers, nausea, vomiting, diarrhea, diminished urine output, cough, increased leg swelling, diminished oral intake, palpitations, chest pain and abdominal pain.  In the ED he was treated with methocarbamol, morphine and oxycodone with moderate relief " "of his pain.      Of note, patient recently treated at [facility] for campylobacter gastroenteritis and enterobacter bacteremia (discharged 4/28).  He was also treated at [facility] for worsening renal failure and hyperkalemia and discharged 5/18 with referral to vascular surgery for AV graft placement.  His daughter reports it is planned to be placed in July.  Daughter also reports planned ERCP/EUS for pancreatic lesion in July and cardiac ablation planned for July 15th."    Overview/Hospital Course:  Patient is 75-year-old man with a non dysfunction on midodrine, coronary artery disease, history of arrhythmias including atrial fibrillation and ventricular tachycardia,  chronic kidney disease stage 5, ulcerative colitis status post remote colectomy with ileostomy, cervical spinal disease status post remote spinal fusion who presents with severe pain in the right shoulder and left groin along with evidence of worsening renal function.    Nephrology consult recommended initiating hemodialysis.  A tunneled dialysis catheter placed today.    Recent imaging showed evidence of small acute left frontal stroke likely unrelated to current presentation.  Evaluated by neurology recommended continuing anticoagulation.    Dr. Adan Santiago (Spine Orthopedic Surgery) evaluated patient and recommended high-dose steroids to treat spinal stenosis along with therapy.    Patient also underwent an arthrocentesis of his right knee.  Patient with 50,000 white cells with neutrophil predominance.  Gram stain negative.    Cultures submitted.    Interval History: Patient reports pain in the right knee about the same.  Back/neck improving.   Bleeding from tunneled dialysis catheter placed yesterday.  Heparin infusion antiplatelet therapy discontinued.    Review of Systems   Constitutional:  Negative for chills and fever.   Respiratory:  Negative for shortness of breath.    Cardiovascular:  Negative for chest pain.   Gastrointestinal:  Negative " for abdominal pain, constipation, diarrhea, nausea and vomiting.   Musculoskeletal:  Positive for arthralgias and back pain.     Objective:     Vital Signs (Most Recent):  Temp: 97.5 °F (36.4 °C) (05/28/25 0821)  Pulse: (!) 56 (05/28/25 0821)  Resp: 18 (05/28/25 0840)  BP: 127/60 (05/28/25 0821)  SpO2: 98 % (05/28/25 0821) Vital Signs (24h Range):  Temp:  [97.5 °F (36.4 °C)-97.8 °F (36.6 °C)] 97.5 °F (36.4 °C)  Pulse:  [] 56  Resp:  [16-18] 18  SpO2:  [93 %-99 %] 98 %  BP: (123-169)/(59-75) 127/60     Weight: 78.2 kg (172 lb 6.4 oz)  Body mass index is 22.13 kg/m².    Intake/Output Summary (Last 24 hours) at 5/28/2025 0915  Last data filed at 5/28/2025 0913  Gross per 24 hour   Intake 173.3 ml   Output 500 ml   Net -326.7 ml         Physical Exam  Constitutional:       Appearance: He is ill-appearing.   Cardiovascular:      Rate and Rhythm: Normal rate and regular rhythm.      Heart sounds: Normal heart sounds. No murmur heard.     No friction rub. No gallop.      Comments: Tunneled hemodialysis catheter to right chest wall, some blood around dressing despite recent replacement dressing.  Pulmonary:      Effort: Pulmonary effort is normal. No respiratory distress.      Breath sounds: Normal breath sounds. No wheezing or rales.   Abdominal:      General: Bowel sounds are normal. There is no distension.      Palpations: Abdomen is soft.      Tenderness: There is no abdominal tenderness.      Comments:  Colostomy with stool in bag.   Musculoskeletal:         General: Tenderness present. Normal range of motion.      Comments: Right knee mildly warm and tender to touch without erythema   Skin:     General: Skin is warm and dry.      Coloration: Skin is not pale.      Findings: No erythema or rash.   Neurological:      Mental Status: He is alert and oriented to person, place, and time.               Significant Labs: All pertinent labs within the past 24 hours have been reviewed.    Significant Imaging: I have  reviewed all pertinent imaging results/findings within the past 24 hours.      Assessment & Plan  Cervical spinal stenosis  Neck pain on right side  Effusion of right knee  Patient presents with significant worsening debility from baseline. MRI C-spine shows severe degenerative changes producing severe foraminal and central canal stenosis from C2 through C5 and severe left foraminal stenosis at C5-6.  MRI soft tissue neck shows no soft tissue mass or lymphadenopathy throughout the soft tissues of the neck.     Dr. Adan Santiago (Spine Orthopedic Surgery) evaluated patient and recommended high-dose steroids to treat spinal stenosis along with therapy.    Patient also underwent an arthrocentesis of his right knee.  Patient with 50,000 white cells with neutrophil predominance.  Gram stain negative.   Dr. Merrill suspect infusion likely inflammatory and not likely a septic joint.   Cultures submitted and started on empiric antibiotic therapy pending culture results.  History of deep venous thrombosis (DVT) of distal vein of left lower extremity  Doppler US of left leg without any evidence of DVT presently.    Anticoagulation held for tunneled line placement.. Restart heparin infusion last night but had to be discontinued to bleeding from his catheter site.  Continue to hold anticoagulation and antiplatelet therapy pending resolution of bleeding.  Falls  Debility  Autonomic dysfunction  Acute ischemic stroke  Orthostatic hypotension  -Noted at least 2 falls recently and saw Dr. Patel with neurology a few days ago.  His note was reviewed, noted concern for possible stroke and I ordered his suggested imaging workup.    -Suspect falls and debility are multifactorial.  -MRI brain notable for punctate focus diffusion restriction about the left frontal subcortical white matter, in keeping with a small acute infarct. No significant surrounding edema or mass effect. No evidence for acute intracranial hemorrhage.   -MRA  brain was normal  Echocardiogram did not show thrombolytic source.  Evaluated by neurology recommended continuing anticoagulation.    Anticoagulation held for  hemodialysis line placement.  Start heparin tonight.  Acute renal failure with acute tubular necrosis superimposed on stage 5 chronic kidney disease, not on chronic dialysis  Dialysis line placement and HD initiation this hospitalization.  Patient underwent dialysis yesterday.  Metabolic derangements improved.  Patient with significant bleeding from dialysis catheter.  Holding antiplatelet therapy and anticoagulation.  Pressure dressing.  Discuss with Nephrology and Interventional Radiology if not able to control bleeding with pressure.  Acute on chronic combined systolic and diastolic congestive heart failure  Essential hypertension  CAD (coronary artery disease)  -On admit he denies any sob despite elevated BNP and peripheral edema.  Denies chest pain.  Troponin negative.  -Echo 2/25/25 showed EF 30%, indeterminate diastolic function, mild LA dilation, mild/moderate MR  -Strict ins/outs and daily weights with low sodium fluid restricted diet  -Continue statin, midodrine and metoprolol.  Holding aspirin due to bleeding.  Atrial paroxysmal tachycardia  -Noted history - ?pAfib? And plans for ablation at [facility] 7/15/25.  -Continue home amiodarone and metoprolol  Holding anticoagulation due to bleeding.  Thrombocytopenia  Mild, stable.  T2DM (type 2 diabetes mellitus)  -A1c 5.7  -Home regimen includes glimepiride - holding for now  -Sugars reasonably controlled  -Will treat with SSI ac/hs for now  History of ulcerative colitis  High output ileostomy  -Noted history  -Ostomy functioning as it should per patient.  Moderate protein-calorie malnutrition  -Nutrition consulted. Most recent weight and BMI monitored-   -Measurements:  Wt Readings from Last 1 Encounters:   05/27/25 78.2 kg (172 lb 6.4 oz)   Body mass index is 22.13 kg/m².  Gout  -Noted history  -Hold prior  colchicine.    Hypothyroidism  -Continue home levothyroxine  Type 2 diabetes mellitus  Reasonably controlled with current regimen.  Will continue with current regimen and continue to monitor.  VTE Risk Mitigation (From admission, onward)           Ordered     Place sequential compression device  Until discontinued         05/26/25 0808     IP VTE HIGH RISK PATIENT  Once         05/26/25 0808     Place sequential compression device  Until discontinued         05/26/25 0546                    Darrell Cheema MD  Department of Hospital Medicine   [facility] ([address])

## 2025-05-28 NOTE — SUBJECTIVE & OBJECTIVE
Interval History: Patient reports pain in the right knee about the same.  Back/neck improving.   Bleeding from tunneled dialysis catheter placed yesterday.  Heparin infusion antiplatelet therapy discontinued.    Review of Systems   Constitutional:  Negative for chills and fever.   Respiratory:  Negative for shortness of breath.    Cardiovascular:  Negative for chest pain.   Gastrointestinal:  Negative for abdominal pain, constipation, diarrhea, nausea and vomiting.   Musculoskeletal:  Positive for arthralgias and back pain.     Objective:     Vital Signs (Most Recent):  Temp: 97.5 °F (36.4 °C) (05/28/25 0821)  Pulse: (!) 56 (05/28/25 0821)  Resp: 18 (05/28/25 0840)  BP: 127/60 (05/28/25 0821)  SpO2: 98 % (05/28/25 0821) Vital Signs (24h Range):  Temp:  [97.5 °F (36.4 °C)-97.8 °F (36.6 °C)] 97.5 °F (36.4 °C)  Pulse:  [] 56  Resp:  [16-18] 18  SpO2:  [93 %-99 %] 98 %  BP: (123-169)/(59-75) 127/60     Weight: 78.2 kg (172 lb 6.4 oz)  Body mass index is 22.13 kg/m².    Intake/Output Summary (Last 24 hours) at 5/28/2025 0915  Last data filed at 5/28/2025 0913  Gross per 24 hour   Intake 173.3 ml   Output 500 ml   Net -326.7 ml         Physical Exam  Constitutional:       Appearance: He is ill-appearing.   Cardiovascular:      Rate and Rhythm: Normal rate and regular rhythm.      Heart sounds: Normal heart sounds. No murmur heard.     No friction rub. No gallop.      Comments: Tunneled hemodialysis catheter to right chest wall, some blood around dressing despite recent replacement dressing.  Pulmonary:      Effort: Pulmonary effort is normal. No respiratory distress.      Breath sounds: Normal breath sounds. No wheezing or rales.   Abdominal:      General: Bowel sounds are normal. There is no distension.      Palpations: Abdomen is soft.      Tenderness: There is no abdominal tenderness.      Comments:  Colostomy with stool in bag.   Musculoskeletal:         General: Tenderness present. Normal range of motion.       Comments: Right knee mildly warm and tender to touch without erythema   Skin:     General: Skin is warm and dry.      Coloration: Skin is not pale.      Findings: No erythema or rash.   Neurological:      Mental Status: He is alert and oriented to person, place, and time.               Significant Labs: All pertinent labs within the past 24 hours have been reviewed.    Significant Imaging: I have reviewed all pertinent imaging results/findings within the past 24 hours.

## 2025-05-28 NOTE — ASSESSMENT & PLAN NOTE
Dialysis line placement and HD initiation this hospitalization.  Patient underwent dialysis yesterday.  Metabolic derangements improved.  Patient with significant bleeding from dialysis catheter.  Holding antiplatelet therapy and anticoagulation.  Pressure dressing.  Discuss with Nephrology and Interventional Radiology if not able to control bleeding with pressure.

## 2025-05-29 LAB
ABSOLUTE EOSINOPHIL (OHS): 0 K/UL
ABSOLUTE MONOCYTE (OHS): 0.2 K/UL (ref 0.3–1)
ABSOLUTE NEUTROPHIL COUNT (OHS): 8.32 K/UL (ref 1.8–7.7)
ANION GAP (OHS): 13 MMOL/L (ref 8–16)
APTT PPP: 29 SECONDS (ref 21–32)
BASOPHILS # BLD AUTO: 0.01 K/UL
BASOPHILS NFR BLD AUTO: 0.1 %
BUN SERPL-MCNC: 52 MG/DL (ref 8–23)
CALCIUM SERPL-MCNC: 7.6 MG/DL (ref 8.7–10.5)
CHLORIDE SERPL-SCNC: 99 MMOL/L (ref 95–110)
CO2 SERPL-SCNC: 22 MMOL/L (ref 23–29)
CREAT SERPL-MCNC: 4 MG/DL (ref 0.5–1.4)
ERYTHROCYTE [DISTWIDTH] IN BLOOD BY AUTOMATED COUNT: 17.2 % (ref 11.5–14.5)
GFR SERPLBLD CREATININE-BSD FMLA CKD-EPI: 15 ML/MIN/1.73/M2
GLUCOSE SERPL-MCNC: 211 MG/DL (ref 70–110)
HCT VFR BLD AUTO: 23.8 % (ref 40–54)
HGB BLD-MCNC: 7.5 GM/DL (ref 14–18)
IMM GRANULOCYTES # BLD AUTO: 0.05 K/UL (ref 0–0.04)
IMM GRANULOCYTES NFR BLD AUTO: 0.6 % (ref 0–0.5)
LYMPHOCYTES # BLD AUTO: 0.24 K/UL (ref 1–4.8)
MAGNESIUM SERPL-MCNC: 1.8 MG/DL (ref 1.6–2.6)
MCH RBC QN AUTO: 30.6 PG (ref 27–31)
MCHC RBC AUTO-ENTMCNC: 31.5 G/DL (ref 32–36)
MCV RBC AUTO: 97 FL (ref 82–98)
NUCLEATED RBC (/100WBC) (OHS): 0 /100 WBC
PHOSPHATE SERPL-MCNC: 5.3 MG/DL (ref 2.7–4.5)
PLATELET # BLD AUTO: 132 K/UL (ref 150–450)
PMV BLD AUTO: 9.7 FL (ref 9.2–12.9)
POCT GLUCOSE: 224 MG/DL (ref 70–110)
POCT GLUCOSE: 225 MG/DL (ref 70–110)
POCT GLUCOSE: 242 MG/DL (ref 70–110)
POCT GLUCOSE: 273 MG/DL (ref 70–110)
POTASSIUM SERPL-SCNC: 4.7 MMOL/L (ref 3.5–5.1)
RBC # BLD AUTO: 2.45 M/UL (ref 4.6–6.2)
RELATIVE EOSINOPHIL (OHS): 0 %
RELATIVE LYMPHOCYTE (OHS): 2.7 % (ref 18–48)
RELATIVE MONOCYTE (OHS): 2.3 % (ref 4–15)
RELATIVE NEUTROPHIL (OHS): 94.3 % (ref 38–73)
SODIUM SERPL-SCNC: 134 MMOL/L (ref 136–145)
VANCOMYCIN SERPL-MCNC: 7.6 UG/ML (ref ?–80)
WBC # BLD AUTO: 8.82 K/UL (ref 3.9–12.7)

## 2025-05-29 PROCEDURE — 94761 N-INVAS EAR/PLS OXIMETRY MLT: CPT

## 2025-05-29 PROCEDURE — 25000003 PHARM REV CODE 250: Performed by: HOSPITALIST

## 2025-05-29 PROCEDURE — 63600175 PHARM REV CODE 636 W HCPCS: Performed by: HOSPITALIST

## 2025-05-29 PROCEDURE — 25000003 PHARM REV CODE 250: Performed by: NURSE PRACTITIONER

## 2025-05-29 PROCEDURE — 80202 ASSAY OF VANCOMYCIN: CPT | Performed by: HOSPITALIST

## 2025-05-29 PROCEDURE — 80100016 HC MAINTENANCE HEMODIALYSIS

## 2025-05-29 PROCEDURE — 97116 GAIT TRAINING THERAPY: CPT | Mod: CQ

## 2025-05-29 PROCEDURE — 85025 COMPLETE CBC W/AUTO DIFF WBC: CPT | Performed by: HOSPITALIST

## 2025-05-29 PROCEDURE — 84100 ASSAY OF PHOSPHORUS: CPT | Performed by: HOSPITALIST

## 2025-05-29 PROCEDURE — 36415 COLL VENOUS BLD VENIPUNCTURE: CPT | Performed by: HOSPITALIST

## 2025-05-29 PROCEDURE — 63600175 PHARM REV CODE 636 W HCPCS: Mod: JZ,TB | Performed by: NURSE PRACTITIONER

## 2025-05-29 PROCEDURE — 80048 BASIC METABOLIC PNL TOTAL CA: CPT | Performed by: HOSPITALIST

## 2025-05-29 PROCEDURE — 21400001 HC TELEMETRY ROOM

## 2025-05-29 PROCEDURE — 85730 THROMBOPLASTIN TIME PARTIAL: CPT | Performed by: HOSPITALIST

## 2025-05-29 PROCEDURE — 83735 ASSAY OF MAGNESIUM: CPT | Performed by: HOSPITALIST

## 2025-05-29 PROCEDURE — A4216 STERILE WATER/SALINE, 10 ML: HCPCS | Performed by: HOSPITALIST

## 2025-05-29 RX ORDER — COLCHICINE 0.6 MG/1
1.2 TABLET, FILM COATED ORAL ONCE
Status: COMPLETED | OUTPATIENT
Start: 2025-05-29 | End: 2025-05-29

## 2025-05-29 RX ADMIN — DEXAMETHASONE SODIUM PHOSPHATE 10 MG: 10 INJECTION INTRAMUSCULAR; INTRAVENOUS at 05:05

## 2025-05-29 RX ADMIN — INSULIN ASPART 4 UNITS: 100 INJECTION, SOLUTION INTRAVENOUS; SUBCUTANEOUS at 06:05

## 2025-05-29 RX ADMIN — BRIMONIDINE TARTRATE 1 DROP: 1.5 SOLUTION/ DROPS OPHTHALMIC at 08:05

## 2025-05-29 RX ADMIN — EPOETIN ALFA-EPBX 10000 UNITS: 10000 INJECTION, SOLUTION INTRAVENOUS; SUBCUTANEOUS at 10:05

## 2025-05-29 RX ADMIN — MUPIROCIN: 20 OINTMENT TOPICAL at 08:05

## 2025-05-29 RX ADMIN — ASCORBIC ACID, THIAMINE MONONITRATE,RIBOFLAVIN, NIACINAMIDE, PYRIDOXINE HYDROCHLORIDE, FOLIC ACID, CYANOCOBALAMIN, BIOTIN, CALCIUM PANTOTHENATE, 1 CAPSULE: 100; 1.5; 1.7; 20; 10; 1; 6000; 150000; 5 CAPSULE, LIQUID FILLED ORAL at 10:05

## 2025-05-29 RX ADMIN — SEVELAMER CARBONATE 800 MG: 800 TABLET, FILM COATED ORAL at 08:05

## 2025-05-29 RX ADMIN — TAMSULOSIN HYDROCHLORIDE 0.4 MG: 0.4 CAPSULE ORAL at 08:05

## 2025-05-29 RX ADMIN — FERROUS SULFATE TAB 325 MG (65 MG ELEMENTAL FE) 1 EACH: 325 (65 FE) TAB at 10:05

## 2025-05-29 RX ADMIN — LEVOTHYROXINE SODIUM 25 MCG: 25 TABLET ORAL at 06:05

## 2025-05-29 RX ADMIN — SODIUM CHLORIDE 1000 MG: 9 INJECTION, SOLUTION INTRAVENOUS at 06:05

## 2025-05-29 RX ADMIN — APIXABAN 2.5 MG: 2.5 TABLET, FILM COATED ORAL at 08:05

## 2025-05-29 RX ADMIN — SEVELAMER CARBONATE 800 MG: 800 TABLET, FILM COATED ORAL at 05:05

## 2025-05-29 RX ADMIN — MIDODRINE HYDROCHLORIDE 10 MG: 5 TABLET ORAL at 08:05

## 2025-05-29 RX ADMIN — AMIODARONE HYDROCHLORIDE 200 MG: 200 TABLET ORAL at 08:05

## 2025-05-29 RX ADMIN — OXYCODONE HYDROCHLORIDE 5 MG: 5 TABLET ORAL at 05:05

## 2025-05-29 RX ADMIN — INSULIN ASPART 3 UNITS: 100 INJECTION, SOLUTION INTRAVENOUS; SUBCUTANEOUS at 08:05

## 2025-05-29 RX ADMIN — GABAPENTIN 200 MG: 100 CAPSULE ORAL at 08:05

## 2025-05-29 RX ADMIN — METOPROLOL SUCCINATE 12.5 MG: 25 TABLET, EXTENDED RELEASE ORAL at 10:05

## 2025-05-29 RX ADMIN — CEFEPIME 1 G: 1 INJECTION, POWDER, FOR SOLUTION INTRAMUSCULAR; INTRAVENOUS at 05:05

## 2025-05-29 RX ADMIN — DEXAMETHASONE SODIUM PHOSPHATE 10 MG: 10 INJECTION INTRAMUSCULAR; INTRAVENOUS at 09:05

## 2025-05-29 RX ADMIN — Medication 10 ML: at 09:05

## 2025-05-29 RX ADMIN — PANTOPRAZOLE SODIUM 40 MG: 40 TABLET, DELAYED RELEASE ORAL at 10:05

## 2025-05-29 RX ADMIN — MUPIROCIN: 20 OINTMENT TOPICAL at 10:05

## 2025-05-29 RX ADMIN — MORPHINE SULFATE 2 MG: 2 INJECTION, SOLUTION INTRAMUSCULAR; INTRAVENOUS at 04:05

## 2025-05-29 RX ADMIN — APIXABAN 2.5 MG: 2.5 TABLET, FILM COATED ORAL at 10:05

## 2025-05-29 RX ADMIN — LIDOCAINE 1 PATCH: 50 PATCH CUTANEOUS at 10:05

## 2025-05-29 RX ADMIN — INSULIN ASPART 4 UNITS: 100 INJECTION, SOLUTION INTRAVENOUS; SUBCUTANEOUS at 08:05

## 2025-05-29 RX ADMIN — COLCHICINE 1.2 MG: 0.6 TABLET, FILM COATED ORAL at 07:05

## 2025-05-29 RX ADMIN — Medication 10 ML: at 05:05

## 2025-05-29 RX ADMIN — PRAVASTATIN SODIUM 40 MG: 20 TABLET ORAL at 10:05

## 2025-05-29 RX ADMIN — Medication 10 ML: at 06:05

## 2025-05-29 NOTE — ASSESSMENT & PLAN NOTE
-Noted at least 2 falls recently and saw Dr. Patel with neurology a few days ago.  His note was reviewed, noted concern for possible stroke and I ordered his suggested imaging workup.    -Suspect falls and debility are multifactorial.  -MRI brain notable for punctate focus diffusion restriction about the left frontal subcortical white matter, in keeping with a small acute infarct. No significant surrounding edema or mass effect. No evidence for acute intracranial hemorrhage.   -MRA brain was normal  Echocardiogram did not show thrombolytic source.  Evaluated by neurology recommended continuing anticoagulation.    Anticoagulation held for  hemodialysis line placement.  Restarted on apixaban today.

## 2025-05-29 NOTE — PT/OT/SLP EVAL
Physical Therapy Evaluation/Treatment Session    Patient Name:  Jarrett Charlton Jr.   MRN:  [id number]    Recommendations:     Discharge Recommendations: Low Intensity Therapy (Assist from daughter)   Discharge Equipment Recommendations:  (TBD)   Barriers to discharge: None    Assessment:     Jarrett Charlton Jr. is a 75 y.o. male admitted with a medical diagnosis of Cervical spinal stenosis.  He presents with the following impairments/functional limitations: weakness, impaired endurance, impaired self care skills, impaired functional mobility, gait instability, impaired balance, decreased ROM, decreased safety awareness, decreased lower extremity function, decreased upper extremity function, impaired skin, impaired cardiopulmonary response to activity, pain, decreased coordination. Pt's main limitation at this time is R knee pain, while improved pt remains with poor tolerance to flexion. Pt requires increased assistance at this time 2* to poor ROM of R knee.     Rehab Prognosis: Good; patient would benefit from acute skilled PT services to address these deficits and reach maximum level of function.    Recent Surgery: Procedure(s) (LRB):  Insertion,catheter,tunneled (N/A) 1 Day Post-Op    Plan:     During this hospitalization, patient to be seen 4 x/week to address the identified rehab impairments via gait training, therapeutic activities, therapeutic exercises, neuromuscular re-education and progress toward the following goals:    Plan of Care Expires:  06/28/25    Subjective     Chief Complaint: Pain in right knee  Patient/Family Comments/goals: To improve pain in R knee.   Pain/Comfort:  Pain Rating 1: 10/10  Location - Side 1: Right  Location 1: knee  Pain Addressed 1: Pre-medicate for activity    Patients cultural, spiritual, Amish conflicts given the current situation: no    Living Environment:  Pt's daughter lives with him, she works at [employer].  Prior to admission, patients level of function  was Mod I with use of DME.  Equipment used at home: cane, straight, wheelchair, walker, rolling.  DME owned (not currently used): none.  Upon discharge, patient will have assistance from daughter; however, unknown how much assistance she can provide.     Objective:     Communicated with RN prior to session.  Patient found HOB elevated with peripheral IV, telemetry  upon PT entry to room.    General Precautions: Standard, aspiration, diabetic, fall  Orthopedic Precautions:N/A   Braces: N/A  Respiratory Status: Room air    Exams:  Cognitive Exam:  Patient is oriented to Person, Place, Time, and Situation  Gross Motor Coordination:  Impaired  RLE ROM: Limited knee flexion 2* to pain. Pt with ~10-15* active knee flexion.   LLE ROM: WFL  BLE Strength: Unable to formally assesses; however, based on clinical observation, pt demonstrates at least a 3/5.    Functional Mobility:  Bed Mobility:     Supine to Sit: moderate assistance  Sit to Supine: minimum assistance  Transfers:     Sit to Stand:  contact guard assistance with rolling walker  Gait: ~40 feet with RW. Pt demonstrates decreased knee flexion on the right, decreased step length and overall shuffling gait pattern with decreased gait speed.   Balance: Good with use of RW.       AM-PAC 6 CLICK MOBILITY  Total Score:16       Treatment & Education:  Bed mobility and transfers as listed above.   Gait as stated above.   Pt was educated on role of PT and POC.  Pt found with feet at end of bed. Bed extender added to allow more space and decrease risk of pressure injury.      Patient left supine with HOB elevated with all lines intact, call button in reach, and RN notified.     GOALS:   Multidisciplinary Problems       Physical Therapy Goals          Problem: Physical Therapy    Goal Priority Disciplines Outcome Interventions   Physical Therapy Goal     PT, PT/OT Progressing    Description: Goals to be met by: 6/28/25    Patient will increase functional independence with  mobility by performin) Supine<>Sit with RW, Mod I  2) Sit <>Stand with RW, Mod I   3) Bed <>Chair with RW, Mod I   4) Pt to ambulate 150 feet with RW Mod I.                             DME Justifications:  Jarrett Charlton Jr. has a mobility limitation that significantly impairs his ability to participate in one or more mobility related activities of daily living (MRADLs) such as toileting, feeding, dressing, grooming, and bathing in customary locations in the home.  The mobility limitation cannot be sufficiently resolved by the use of a cane or walker.   The use of a manual wheelchair will significantly improve the patients ability to participate in MRADLS and the patient will use it on regular basis in the home.  Jarrett Charlton Jr. has expressed his willingness to use a manual wheelchair in the home. Patients upper body strength is sufficient for propulsion.  He also has a caregiver who is available, willing, and able to provide assistance with the wheelchair when needed.      History:     Past Medical History:   Diagnosis Date    Autonomic dysfunction 2023 neuro note    Basal cell carcinoma     BPH (benign prostatic hyperplasia)     s/p TURP    CAD (coronary artery disease) 2017    status post PCI to mid LAD in  ([facility] in  showed patent stent, otherwise nonobstructive CAD)    Cancer of prostate     s/p TURP    Chronic kidney disease     DDD (degenerative disc disease) 10/21/2013    Diabetes mellitus with renal complications     Disc disease, degenerative, cervical     DVT (deep venous thrombosis)     Encounter for blood transfusion     GERD (gastroesophageal reflux disease)     History of ulcerative colitis     s/p colectomy and ileostomy    HLD (hyperlipidemia)     Ileostomy in place     RBBB     Squamous cell carcinoma 2018    Left superior helix near insertion    Squamous cell carcinoma 2018    Left forearm x 5    Ventricular tachycardia        Past Surgical  History:   Procedure Laterality Date    ABLATION, SVT, ACCESSORY PATHWAY N/A 04/30/2024    Procedure: Ablation, SVT, Accessory Pathway;  Surgeon: Franky Taylor MD;  Location: [facility] EP LAB;  Service: Cardiology;  Laterality: N/A;  VT, RFA, Carto, MAC, GP, 3 Prep *MDT ILR in situ*    cardiac stents      CATARACT EXTRACTION Bilateral     CERVICAL FUSION      CHOLECYSTECTOMY N/A 02/14/2023    Procedure: CHOLECYSTECTOMY;  Surgeon: Mike Joyce MD;  Location: [facility];  Service: General;  Laterality: N/A;  very high probabilty of converison to open    colectomy and ileostomy  1985    EGD, WITH CLOSED BIOPSY  04/04/2025    ENDOSCOPIC ULTRASOUND OF UPPER GASTROINTESTINAL TRACT N/A 02/10/2023    Procedure: ULTRASOUND, UPPER GI TRACT, ENDOSCOPIC;  Surgeon: Poli Fuller MD;  Location: [facility] ENDO;  Service: Endoscopy;  Laterality: N/A;    ERCP N/A 02/10/2023    Procedure: ERCP (ENDOSCOPIC RETROGRADE CHOLANGIOPANCREATOGRAPHY);  Surgeon: Poli Fuller MD;  Location: [facility];  Service: Endoscopy;  Laterality: N/A;    ESOPHAGOGASTRODUODENOSCOPY N/A 4/4/2025    Procedure: EGD (ESOPHAGOGASTRODUODENOSCOPY);  Surgeon: Melania Gibson MD;  Location: [facility];  Service: Endoscopy;  Laterality: N/A;    EXCISION OF PAROTID GLAND Left 12/18/2020    Procedure: EXCISION, PAROTID GLAND;  Surgeon: Michael Pinzon MD;  Location: [address];  Service: ENT;  Laterality: Left;    INSERTION OF IMPLANTABLE LOOP RECORDER  06/07/2021    INSERTION OF IMPLANTABLE LOOP RECORDER Left 06/07/2021    Procedure: INSERTION, IMPLANTABLE LOOP RECORDER;  Surgeon: Romario Dao MD;  Location: [facility] CATH LAB;  Service: Cardiology;  Laterality: Left;    INSERTION, CATHETER, TUNNELED N/A 5/27/2025    Procedure: Insertion,catheter,tunneled;  Surgeon: Jamie Stokes MD;  Location: [facility] CATH LAB;  Service: Radiology;  Laterality: N/A;    LEFT HEART CATHETERIZATION Right 04/15/2021    Procedure: CATHETERIZATION, HEART, LEFT;  Surgeon: Marcio  MD Robert;  [place] [facility] [place] [facility];  Service: Cardiology;  Laterality: Right;    LYSIS OF ADHESIONS N/A 11/09/2020    Procedure: LYSIS, ADHESIONS,  ERAS low;  Surgeon: CED Haley MD;  Location: [facility] OR 2ND FLR;  Service: Colon and Rectal;  Laterality: N/A;    LYSIS OF ADHESIONS N/A 02/14/2023    Procedure: LYSIS, ADHESIONS;  Surgeon: Mike Joyce MD;  Location: [facility];  Service: General;  Laterality: N/A;    POUCHOSCOPY N/A 04/06/2022    Procedure: ENDOSCOPY, POUCH, SMALL INTESTINE, DIAGNOSTIC;  Surgeon: Dieter Juarez MD;  Location: [facility] ENDO (2ND FLR);  Service: Endoscopy;  Laterality: N/A;    REPAIR, HERNIA, PARASTOMAL N/A 11/09/2020    Procedure: REPAIR, HERNIA, PARASTOMAL;  Surgeon: CED Haley MD;  Location: [facility] OR 2ND FLR;  Service: Colon and Rectal;  Laterality: N/A;    TRANSURETHRAL RESECTION OF PROSTATE (TURP) WITHOUT USE OF LASER N/A 01/23/2019    Procedure: TURP, WITHOUT USING LASER BIPOLAR;  Surgeon: Catarino Mota MD;  Location: [facility] OR 1ST FLR;  Service: Urology;  Laterality: N/A;  1.5 HOURS    TREATMENT OF CARDIAC ARRHYTHMIA  04/30/2024    Procedure: Cardioversion or Defibrillation;  Surgeon: Franky Taylor MD;  Location: [facility] EP LAB;  Service: Cardiology;;       Time Tracking:     PT Received On: 05/28/25  PT Start Time: 1715     PT Stop Time: 1745  PT Total Time (min): 30 min     Billable Minutes: Evaluation 15 and Therapeutic Activity 15      05/28/2025

## 2025-05-29 NOTE — ASSESSMENT & PLAN NOTE
-Noted history - ?pAfib? And plans for ablation at [facility] 7/15/25.  -Continue home amiodarone and metoprolol  Restarted apixaban

## 2025-05-29 NOTE — PT/OT/SLP PROGRESS
Occupational Therapy      Patient Name:  Jarrett Charlton Jr.   MRN:  [id number]    Patient not seen today secondary to Dialysis. Will follow-up later today as schedule permits.    5/29/2025

## 2025-05-29 NOTE — PLAN OF CARE
Problem: Physical Therapy  Goal: Physical Therapy Goal  Description: Goals to be met by: 25    Patient will increase functional independence with mobility by performin) Supine<>Sit with RW, Mod I  2) Sit <>Stand with RW, Mod I   3) Bed <>Chair with RW, Mod I   4) Pt to ambulate 150 feet with RW Mod I.        Outcome: Progressing

## 2025-05-29 NOTE — ASSESSMENT & PLAN NOTE
-Nutrition consulted. Most recent weight and BMI monitored-   -Measurements:  Wt Readings from Last 1 Encounters:   05/29/25 78.9 kg (173 lb 15.1 oz)   Body mass index is 22.33 kg/m².

## 2025-05-29 NOTE — ASSESSMENT & PLAN NOTE
Dialysis line placement and HD initiation this hospitalization.  Patient underwent dialysis yesterday.  Metabolic derangements improved.

## 2025-05-29 NOTE — PLAN OF CARE
"Patient informed CM "I was supposed to start dialysis at [facility] in July but I guess everything is being moved up now"    CM called [facility] to set up chair for patient but there was no answer. CM left a voicemail for a call back.   "

## 2025-05-29 NOTE — ASSESSMENT & PLAN NOTE
Patient presents with significant worsening debility from baseline. MRI C-spine shows severe degenerative changes producing severe foraminal and central canal stenosis from C2 through C5 and severe left foraminal stenosis at C5-6.  MRI soft tissue neck shows no soft tissue mass or lymphadenopathy throughout the soft tissues of the neck.     Dr. Adan Santiago (Spine Orthopedic Surgery) evaluated patient and recommended high-dose steroids to treat spinal stenosis along with therapy.    Patient also underwent an arthrocentesis of his right knee.  Patient with 50,000 white cells with neutrophil predominance.  Gram stain negative.   Dr. Merrill suspect infusion likely inflammatory and not likely a septic joint.   Cultures submitted and started on empiric antibiotic therapy pending culture results.    Okay for colchicine per Nephrology.  We will dose see if that also helps with as needed pain.

## 2025-05-29 NOTE — PROGRESS NOTES
Pharmacokinetic Assessment Follow Up: IV Vancomycin    Vancomycin serum concentration assessment(s):    The random level was drawn correctly and can be used to guide therapy at this time. The measurement is below the desired definitive target range of 15 to 20 mcg/mL.    Vancomycin Regimen Plan:    Give IV Vanc 1000 mg x 1 dose.   Re-dose when the random level is less than 25 mcg/mL, next level to be drawn at 0430 on 5/30 with AM labs  Pulse dosing post-HD based on pre-HD level drawn with AM labs on the morning of scheduled HD     Drug levels (last 3 results):  Recent Labs   Lab Result Units 05/29/25  0417   Vancomycin Random ug/ml 7.6       Pharmacy will continue to follow and monitor vancomycin.    Please contact pharmacy at extension [phone] for questions regarding this assessment.    Thank you for the consult,   Amanda Bliss       Patient brief summary:  Jarrett Charlton Jr. is a 75 y.o. male initiated on antimicrobial therapy with IV Vancomycin for treatment of bone/joint infection    The patient's current regimen is pulse dosing post-HD based on pre-HD level drawn with AM labs on the morning of scheduled HD       Drug Allergies:   Review of patient's allergies indicates:   Allergen Reactions    Atorvastatin     Rosuvastatin        Actual Body Weight:   78.2 kg    Renal Function:   Estimated Creatinine Clearance: 17.4 mL/min (A) (based on SCr of 4 mg/dL (H)).,     Dialysis Method (if applicable):  intermittent HD    CBC (last 72 hours):  Recent Labs   Lab Result Units 05/26/25  0640 05/27/25  0309 05/27/25  1809 05/28/25  0351 05/29/25  0417   WBC K/uL 6.79 8.12 10.46 9.04 8.82   HGB gm/dL 8.1* 8.1* 8.2* 7.6* 7.5*   HCT % 26.0* 25.5* 25.2* 24.4* 23.8*   Platelet Count K/uL 135* 126* 125* 128* 132*   Lymph % % 11.6* 3.4* 4.0* 3.1* 2.7*   Mono % % 8.7 3.2* 5.4 2.7* 2.3*   Eos % % 2.1 0.0 0.0 0.0 0.0   Basophil % % 0.1 0.1 0.1 0.1 0.1       Metabolic Panel (last 72 hours):  Recent Labs   Lab Result Units  05/26/25  0640 05/27/25  0309 05/28/25  0351 05/29/25  0417   Sodium mmol/L 141 138 136 134*   Potassium mmol/L 4.2 4.6 4.7 4.7   Chloride mmol/L 110 106 103 99   CO2 mmol/L 16* 15* 20* 22*   Glucose mg/dL 102 151* 202* 211*   BUN mg/dL 90* 95* 65* 52*   Creatinine mg/dL 7.8* 7.9* 5.3* 4.0*   Magnesium  mg/dL 1.6 1.6 1.8 1.8   Phosphorus Level mg/dL  --  8.3* 6.8* 5.3*       Vancomycin Administrations:  vancomycin given in the last 96 hours                     vancomycin 1,500 mg in 0.9% NaCl 250 mL IVPB (admixture device) (mg) 1,500 mg New Bag 05/27/25 1826                    Microbiologic Results:  Microbiology Results (last 7 days)       Procedure Component Value Units Date/Time    Culture, Body Fluid (Aerobic) w/ GS [[id number]] Collected: 05/26/25 1908    Order Status: Completed Specimen: Joint Fluid, Right Knee Updated: 05/28/25 1122     CULTURE, FLUID No Growth To Date     GRAM STAIN Few WBC seen      No organisms seen    Fungus culture [[id number]] Collected: 05/26/25 1908    Order Status: Sent Specimen: Joint Fluid, Right Knee Updated: 05/26/25 1920

## 2025-05-29 NOTE — PROGRESS NOTES
"Nephrology  Progress Note    Admit Date: 5/26/2025   LOS: 2 days     SUBJECTIVE:     Follow-up For:  GRADY    History of Present Illness:  Patient is a 75 y.o. male presents with neck/shoulder pain.  W/up in process and preliminarily MRI with acute left frontal infarct.  Labs significant for worsening GRADY/CKD (Creat 7.8, BUN 94, BNP 1253).  Pt with extensive med hx as outlined below and known to me from recent admission.  Significant renal disease and followed by [facility].  Has had rapid progression of CKD over past year with creat steadily rising 3-4-5-6-7.  Has been referred to vascular for AVF and transplant.  Seen after multiple testings.  Looks older than stated age,  frail, etc.  C/o pain in neck, head.       EPIC reviewed.  Case discussed with team.          Interval History:     Daily improvements.  HD #3 today.  Awaiting DC plans.     Review of Systems:  Constitutional: No fever or chills  Respiratory: No cough or shortness of breath  Cardiovascular: No chest pain or palpitations  Gastrointestinal: No nausea or vomiting  Neurological: No confusion or weakness    OBJECTIVE:     Vital Signs Range (Last 24H):  BP (!) 113/54 (BP Location: Right arm, Patient Position: Lying)   Pulse (!) 58   Temp 97.9 °F (36.6 °C) (Oral)   Resp 18   Ht 6' 2" (1.88 m)   Wt 78.9 kg (173 lb 15.1 oz)   SpO2 97%   BMI 22.33 kg/m²     Temp:  [97.5 °F (36.4 °C)-98.7 °F (37.1 °C)]   Pulse:  [52-67]   Resp:  [16-20]   BP: (113-161)/(54-74)   SpO2:  [97 %-100 %]     I & O (Last 24H):  Intake/Output Summary (Last 24 hours) at 5/29/2025 0973  Last data filed at 5/28/2025 2339  Gross per 24 hour   Intake 1090 ml   Output 1650 ml   Net -560 ml       Physical Exam:  General appearance:  NAD this am.  Eyes:  Conjunctivae/corneas clear. PERRL.  Lungs: Normal respiratory effort,   clear to auscultation bilaterally   Heart: Regular rate and rhythm, S1, S2 normal, 3/6 murmur  Abdomen: Soft, nontender, ostomy + bowel " sounds  Extremities: No cyanosis or clubbing.  trace+ edema.    Skin: Skin color pale, texture, turgor normal. No rashes or lesions  Neurological:  nonfocal  RIJ ROBINSON.       Laboratory Data:  Recent Labs   Lab 05/29/25 0417   WBC 8.82   RBC 2.45*   HGB 7.5*   HCT 23.8*   *   MCV 97   MCH 30.6   MCHC 31.5*       BMP:   Recent Labs   Lab 05/29/25 0417   *   *   K 4.7   CL 99   CO2 22*   BUN 52*   CREATININE 4.0*   CALCIUM 7.6*   MG 1.8   PHOS 5.3*     Lab Results   Component Value Date    CALCIUM 7.6 (L) 05/29/2025    PHOS 5.3 (H) 05/29/2025       Lab Results   Component Value Date    .4 (H) 05/16/2025    CALCIUM 7.6 (L) 05/29/2025    PHOS 5.3 (H) 05/29/2025       Lab Results   Component Value Date    URICACID 7.9 (H) 10/10/2024       BNP  Recent Labs   Lab 05/26/25  0350   BNP 1,253*       Medications:  Medication list was reviewed and changes noted under Assessment/Plan.    Diagnostic Results:    IR Tunneled Catheter Insert w/o Port   Final Result      Insertion of right -sided supradiaphragmatic dual- lumen tunneled hemodialysis catheter, with tip in the expected location of the cavoatrial junction.      Plan:      The catheter may be used immediately.      _______________________________________________________________         Electronically signed by: Jamie Stokes MD   Date:    05/27/2025   Time:    13:24      X-Ray Cervical Spine 5 View W Flex Extxt   Final Result      Chronic change as above.         Electronically signed by: Kurt Bustillo MD   Date:    05/27/2025   Time:    07:46      X-Ray Hip 2 or 3 views Left with Pelvis when performed   Final Result      Please see above.         Electronically signed by: Derrek Vega   Date:    05/26/2025   Time:    14:01      US Carotid Bilateral   Final Result      No evidence of a hemodynamically significant carotid bifurcation stenosis.         Electronically signed by: Derrek Vega   Date:    05/26/2025   Time:    11:23      MRI Soft  Tissue Neck Without Contrast   Final Result      No soft tissue mass or lymphadenopathy throughout the soft tissues of the neck.         Electronically signed by: Derrek Vega   Date:    05/26/2025   Time:    13:11      MRI Cervical Spine Without Contrast   Final Result      Severe degenerative changes as described producing severe foraminal and central canal stenosis from C2 through C5 and severe left foraminal stenosis at C5-6.         Electronically signed by: Jonathan Hill Jr   Date:    05/26/2025   Time:    12:16      MRA Brain without contrast   Final Result      MR angiogram of the head appears within normal limits.         Electronically signed by: Derrek Vega   Date:    05/26/2025   Time:    10:27      MRI Brain Without Contrast   Final Result   Abnormal      Punctate focus diffusion restriction about the left frontal subcortical white matter, in keeping with a small acute infarct.  No significant surrounding edema or mass effect.  No evidence for acute intracranial hemorrhage.      Sequela of microvascular ischemic change.      This report was flagged in Epic as abnormal.         Electronically signed by: Derrek Vega   Date:    05/26/2025   Time:    10:16      US Lower Extremity Veins Left   Final Result      No evidence of deep venous thrombosis in the left lower extremity.         Electronically signed by: Derrek Vega   Date:    05/26/2025   Time:    08:23      X-Ray Chest AP Portable   Final Result      Bibasilar atelectasis with chronic prominence of the pulmonary vascular markings.         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  Now has transitioned to ESRD:  -followed closely by [facility]  -recently DC'd with Creat 6.3/BUN 79  -referred to vascular  and transplant.   -hold diuretics  -Creat 7.8/BUN 90  -was on eliquis but held with planned ROBINSON.  Hep drip off  -bicarb tabs for worsening AGMA (CKD/GI losses) but stop with HD  -5/27:  Ext discussion with pt about RRT options vs Palliative.  Wants to do HD in [place].  Salvaging left arm for AVF/AVG next month.  Consulted Dr. Middleton for ROBINSON today but IR had to place.  Start RRT, labs, and consult SW to start HD as outpt.   -5/28:  continue with gentle HD X 3 treatments and then transition to 3/week.  Monitored ROBINSON site as may need stitch but doing well  -5/29:  HD #3 today and then TTS.  Await outpt HD arrangements.    -see orders.  -Renally dose meds, avoid nephrotoxins, and monitor I/O's closely.      Acute CVA:  -MRI noted.  -defer to neurology.      Spinal Stenosis:  -on decadron.   -defer     Multifactorial anemia:  -RODRIGUE cleared by neurology  -recent IV iron  -transfuse if needed.    MBD:  -nephrocaps/binders  -PTH at goal for CKD     Hypotension:  -cont midodrine.     LLE DVT:  -on eliquis but held with bleeding.   -resume today.      PAF and severe PA-HTN:  -defer     UC w/ ileostomy:  -not a candidate for PD        DISPO:     Multiple comorbidities  DNR        Thanks for consult  See above  Will follow along.        High MDM complexity necessary to treat or prevent imminent or life-threatening deterioration of the following conditions: ESRD  Time spent personally by me on the following activities 50 min: development of treatment plan with patient or surrogate, discussions with consultants, interpretation of cardiac output measurements, examination of patient, ordering and performing treatments and interventions, evaluation of patient's response to treatment, obtaining history from patient or surrogate, ordering and review of laboratory studies, ordering and review of radiographic studies, re-evaluation of patient's condition, pulse oximetry and blood draw for specimens

## 2025-05-29 NOTE — PLAN OF CARE
Problem: Adult Inpatient Plan of Care  Goal: Plan of Care Review  Flowsheets (Taken 5/29/2025 0040)  Plan of Care Reviewed With: patient  Goal: Absence of Hospital-Acquired Illness or Injury  Intervention: Identify and Manage Fall Risk  Flowsheets (Taken 5/29/2025 0040)  Safety Promotion/Fall Prevention:   bed alarm set   Fall Risk reviewed with patient/family   room near unit station   medications reviewed   side rails raised x 2  Intervention: Prevent Skin Injury  Flowsheets (Taken 5/29/2025 0040)  Body Position: position changed independently  Intervention: Prevent and Manage VTE (Venous Thromboembolism) Risk  Flowsheets (Taken 5/29/2025 0040)  VTE Prevention/Management: ROM (active) performed  Goal: Optimal Comfort and Wellbeing  Intervention: Monitor Pain and Promote Comfort  Flowsheets (Taken 5/29/2025 0040)  Pain Management Interventions: pain management plan reviewed with patient/caregiver  Intervention: Provide Person-Centered Care  Flowsheets (Taken 5/29/2025 0040)  Trust Relationship/Rapport:   care explained   choices provided   questions answered   questions encouraged   reassurance provided   thoughts/feelings acknowledged     Problem: Diabetes Comorbidity  Goal: Blood Glucose Level Within Targeted Range  Intervention: Monitor and Manage Glycemia  Flowsheets (Taken 5/29/2025 0040)  Glycemic Management:   blood glucose monitored   supplemental insulin given     Problem: Coping Ineffective  Goal: Effective Coping  Intervention: Support and Enhance Coping Strategies  Flowsheets (Taken 5/29/2025 0040)  Supportive Measures:   relaxation techniques promoted   self-care encouraged     Problem: Pain Acute  Goal: Optimal Pain Control and Function  Intervention: Develop Pain Management Plan  Flowsheets (Taken 5/29/2025 0040)  Pain Management Interventions: pain management plan reviewed with patient/caregiver  Intervention: Prevent or Manage Pain  Flowsheets (Taken 5/29/2025 0040)  Sleep/Rest Enhancement:  awakenings minimized  Medication Review/Management: medications reviewed  Intervention: Optimize Psychosocial Wellbeing  Flowsheets (Taken 5/29/2025 0040)  Supportive Measures:   relaxation techniques promoted   self-care encouraged  Diversional Activities: television

## 2025-05-29 NOTE — PROGRESS NOTES
"[address] Medicine  Progress Note    Patient Name: Jarrett Charlton Jr.  MRN: [id number]  Patient Class: IP- Inpatient   Admission Date: 5/26/2025  Length of Stay: 2 days  Attending Physician: Darrell Cheema MD  Primary Care Provider: Olga Hampton MD        Subjective     Principal Problem:Cervical spinal stenosis        HPI:  As per Poli Damon MD:    "Mr. Charlton is a 75 year old man with autonomic dysfunction on midodrine, coronary artery disease, progressing CKDV, BPH prostate cancer s/p TURP, BPH, cervical disk disease (s/p spinal fusion in the 80s), ulcerative colitis (s/p colectomy and illiostomy, ventricular tachycardia / ?atrial fibrillation s/p ablation who presented for evaluation of severe pain in his right shoulder / neck and left groin.  History is obtained by patient and his daughter at bedside.  They note a fall several months ago with abrasion to his lumbar back and shins.  He also had a fall 3 days ago which he states occurred when he was turning and tripped over his shoes.  He didn't hit his head, but did fall onto his right hand/arm.  He had no severe pain at that time.  He states that yesterday he woke up feeling ok, but as the day progressed he developed severe left inner groin pain.  The pain has been progressively worse throughout the day and is exacerbated by walking and laying down.  He also reports developing severe pain in his neck starting at the posterior base of his cranium and radiating down to his right shoulder.  The pain is severe and also worse with laying flat.  He describes the neck/shoulder pain as throbbing and intense.  He reports baseline dyspnea on exertion which is stable.  He denies fevers, nausea, vomiting, diarrhea, diminished urine output, cough, increased leg swelling, diminished oral intake, palpitations, chest pain and abdominal pain.  In the ED he was treated with methocarbamol, morphine and oxycodone with moderate relief " "of his pain.      Of note, patient recently treated at [facility] for campylobacter gastroenteritis and enterobacter bacteremia (discharged 4/28).  He was also treated at [facility] [place], [place] for worsening renal failure and hyperkalemia and discharged 5/18 with referral to vascular surgery for AV graft placement.  His daughter reports it is planned to be placed in July.  Daughter also reports planned ERCP/EUS for pancreatic lesion in July and cardiac ablation planned for July 15th."    Overview/Hospital Course:  Patient is 75-year-old man with a non dysfunction on midodrine, coronary artery disease, history of arrhythmias including atrial fibrillation and ventricular tachycardia,  chronic kidney disease stage 5, ulcerative colitis status post remote colectomy with ileostomy, cervical spinal disease status post remote spinal fusion who presents with severe pain in the right shoulder and left groin along with evidence of worsening renal function.    Nephrology consult recommended initiating hemodialysis.  A tunneled dialysis catheter placed today.    Recent imaging showed evidence of small acute left frontal stroke likely unrelated to current presentation.  Evaluated by neurology recommended continuing anticoagulation.    Dr. Adan Santiago (Spine Orthopedic Surgery) evaluated patient and recommended high-dose steroids to treat spinal stenosis along with therapy.    Patient also underwent an arthrocentesis of his right knee.  Patient with 50,000 white cells with neutrophil predominance.  Gram stain negative.    Cultures submitted.    Interval History: Bleeding for dialysis catheter resolved.  Right knee pain persists.  Back/neck pain doing well.    Review of Systems   Constitutional:  Negative for chills and fever.   Respiratory:  Negative for shortness of breath.    Cardiovascular:  Negative for chest pain.   Gastrointestinal:  Negative for abdominal pain, constipation, diarrhea, nausea and vomiting.   Musculoskeletal:  Positive " for arthralgias and back pain.     Objective:     Vital Signs (Most Recent):  Temp: 97.6 °F (36.4 °C) (05/29/25 1246)  Pulse: (!) 55 (05/29/25 1502)  Resp: 18 (05/29/25 1246)  BP: (!) 159/71 (05/29/25 1246)  SpO2: 98 % (05/29/25 1246) Vital Signs (24h Range):  Temp:  [97.6 °F (36.4 °C)-98.7 °F (37.1 °C)] 97.6 °F (36.4 °C)  Pulse:  [52-89] 55  Resp:  [16-20] 18  SpO2:  [97 %-100 %] 98 %  BP: (113-159)/(54-71) 159/71     Weight: 78.9 kg (173 lb 15.1 oz)  Body mass index is 22.33 kg/m².    Intake/Output Summary (Last 24 hours) at 5/29/2025 1658  Last data filed at 5/29/2025 1259  Gross per 24 hour   Intake 880 ml   Output 650 ml   Net 230 ml         Physical Exam  Constitutional:       Appearance: He is ill-appearing.   Cardiovascular:      Rate and Rhythm: Normal rate and regular rhythm.      Heart sounds: Normal heart sounds. No murmur heard.     No friction rub. No gallop.      Comments: Tunneled hemodialysis catheter to right chest wall, some blood around dressing despite recent replacement dressing.  Pulmonary:      Effort: Pulmonary effort is normal. No respiratory distress.      Breath sounds: Normal breath sounds. No wheezing or rales.   Abdominal:      General: Bowel sounds are normal. There is no distension.      Palpations: Abdomen is soft.      Tenderness: There is no abdominal tenderness.      Comments:  Colostomy with stool in bag.   Musculoskeletal:         General: Tenderness present. Normal range of motion.      Comments: Right knee mildly warm and tender to touch without erythema   Skin:     General: Skin is warm and dry.      Coloration: Skin is not pale.      Findings: No erythema or rash.   Neurological:      Mental Status: He is alert and oriented to person, place, and time.               Significant Labs: All pertinent labs within the past 24 hours have been reviewed.    Significant Imaging: I have reviewed all pertinent imaging results/findings within the past 24 hours.      Assessment &  Plan  Cervical spinal stenosis  Neck pain on right side  Effusion of right knee  Patient presents with significant worsening debility from baseline. MRI C-spine shows severe degenerative changes producing severe foraminal and central canal stenosis from C2 through C5 and severe left foraminal stenosis at C5-6.  MRI soft tissue neck shows no soft tissue mass or lymphadenopathy throughout the soft tissues of the neck.     Dr. Adan Santiago (Spine Orthopedic Surgery) evaluated patient and recommended high-dose steroids to treat spinal stenosis along with therapy.    Patient also underwent an arthrocentesis of his right knee.  Patient with 50,000 white cells with neutrophil predominance.  Gram stain negative.   Dr. Merrill suspect infusion likely inflammatory and not likely a septic joint.   Cultures submitted and started on empiric antibiotic therapy pending culture results.    Okay for colchicine per Nephrology.  We will dose see if that also helps with as needed pain.  History of deep venous thrombosis (DVT) of distal vein of left lower extremity  Doppler US of left leg without any evidence of DVT presently.    Anticoagulation held for tunneled line placement..  Bleeding from dialysis catheter resolved.  Restarted apixaban today.    Falls  Debility  Autonomic dysfunction  Acute ischemic stroke  Orthostatic hypotension  -Noted at least 2 falls recently and saw Dr. Patel with neurology a few days ago.  His note was reviewed, noted concern for possible stroke and I ordered his suggested imaging workup.    -Suspect falls and debility are multifactorial.  -MRI brain notable for punctate focus diffusion restriction about the left frontal subcortical white matter, in keeping with a small acute infarct. No significant surrounding edema or mass effect. No evidence for acute intracranial hemorrhage.   -MRA brain was normal  Echocardiogram did not show thrombolytic source.  Evaluated by neurology recommended  continuing anticoagulation.    Anticoagulation held for  hemodialysis line placement.  Restarted on apixaban today.  Acute renal failure with acute tubular necrosis superimposed on stage 5 chronic kidney disease, not on chronic dialysis  Dialysis line placement and HD initiation this hospitalization.  Patient underwent dialysis yesterday.  Metabolic derangements improved.   Acute on chronic combined systolic and diastolic congestive heart failure  Essential hypertension  CAD (coronary artery disease)  -On admit he denies any sob despite elevated BNP and peripheral edema.  Denies chest pain.  Troponin negative.  -Echo 2/25/25 showed EF 30%, indeterminate diastolic function, mild LA dilation, mild/moderate MR  -Strict ins/outs and daily weights with low sodium fluid restricted diet  -Continue statin, midodrine and metoprolol.  Restarted apixaban today.  Atrial paroxysmal tachycardia  -Noted history - ?pAfib? And plans for ablation at [facility] [place]/25.  -Continue home amiodarone and metoprolol  Restarted apixaban  Thrombocytopenia  Mild, stable.  T2DM (type 2 diabetes mellitus)  -A1c 5.7  -Home regimen includes glimepiride - holding for now  -Sugars reasonably controlled  -Will treat with SSI ac/hs for now  History of ulcerative colitis  High output ileostomy  -Noted history  -Ostomy functioning as it should per patient.  Moderate protein-calorie malnutrition  -Nutrition consulted. Most recent weight and BMI monitored-   -Measurements:  Wt Readings from Last 1 Encounters:   05/29/25 78.9 kg (173 lb 15.1 oz)   Body mass index is 22.33 kg/m².  Gout  -Noted history  -Hold prior colchicine.    Hypothyroidism  -Continue home levothyroxine  Type 2 diabetes mellitus  Reasonably controlled with current regimen.  Will continue with current regimen and continue to monitor.  VTE Risk Mitigation (From admission, onward)           Ordered     apixaban tablet 2.5 mg  2 times daily         05/29/25 0930     heparin (porcine) injection  4,000 Units  As needed (PRN)         05/28/25 1306     Place sequential compression device  Until discontinued         05/26/25 0808     IP VTE HIGH RISK PATIENT  Once         05/26/25 0808     Place sequential compression device  Until discontinued         05/26/25 0546                        Darrell Cheema MD  Department of Hospital Medicine   [facility] - Med Surg ([address])

## 2025-05-29 NOTE — PLAN OF CARE
CM faxed over Patient Acceptance form to [facility]    CM will continue to follow

## 2025-05-29 NOTE — PT/OT/SLP PROGRESS
Physical Therapy Treatment    Patient Name:  Jarrett Charlton Jr.   MRN:  [id number]    Recommendations:     Discharge Recommendations: Low Intensity Therapy (Assist from daughter)  Discharge Equipment Recommendations:  (TBD)  Barriers to discharge: None    Assessment:     Jarrett Charlton Jr. is a 75 y.o. male admitted with a medical diagnosis of Cervical spinal stenosis.  He presents with the following impairments/functional limitations: weakness, gait instability, pain, impaired balance, impaired cardiopulmonary response to activity, impaired endurance, impaired functional mobility, impaired self care skills, impaired skin, decreased coordination, decreased lower extremity function, decreased ROM, decreased safety awareness, decreased upper extremity function.    Supine<>sit with CGA at RLE  Sit>stand with RW and CGA  Amb 20' + 20' + 40' with RW and CGA, shuffling gait, decreased R knee flexion  Static stand at sink x 2 mins with SBA for teeth brushing  Pt with fair mobility, limited by R knee pain  Rec Low Intensity Therapy with assist from daughter    Rehab Prognosis: Good; patient would benefit from acute skilled PT services to address these deficits and reach maximum level of function.    Recent Surgery: Procedure(s) (LRB):  Insertion,catheter,tunneled (N/A) 2 Days Post-Op    Plan:     During this hospitalization, patient to be seen 4 x/week to address the identified rehab impairments via gait training, therapeutic activities, therapeutic exercises, neuromuscular re-education and progress toward the following goals:    Plan of Care Expires:  06/28/25    Subjective     Chief Complaint: knee pain; people coming into pt room constantly since 4am; pt very aggravated  Patient/Family Comments/goals: it's the gout in my knee, that's what it is. Some prednisone will knock it right out  Pain/Comfort:  Pain Rating 1: 7/10  Location - Side 1: Right  Location 1: knee  Pain Addressed 1: Reposition, Distraction, Cessation of  Activity, Nurse notified  Pain Rating Post-Intervention 1: 7/10      Objective:     Communicated with nurse Winkler prior to session.  Patient found HOB elevated with peripheral IV, telemetry, bed alarm upon PT entry to room.     General Precautions: Standard, aspiration, diabetic, fall  Orthopedic Precautions: N/A  Braces: N/A  Respiratory Status: Room air     Functional Mobility:  Bed Mobility:     Supine to Sit: contact guard assistance  Sit to Supine: contact guard assistance  Transfers:     Sit to Stand:  contact guard assistance with rolling walker  Gait: 20' + 20' + 40' with RW and CGA, shuffling gait, decreased R knee flexion      AM-PAC 6 CLICK MOBILITY  Turning over in bed (including adjusting bedclothes, sheets and blankets)?: 3  Sitting down on and standing up from a chair with arms (e.g., wheelchair, bedside commode, etc.): 3  Moving from lying on back to sitting on the side of the bed?: 3  Moving to and from a bed to a chair (including a wheelchair)?: 3  Need to walk in hospital room?: 3  Climbing 3-5 steps with a railing?: 1  Basic Mobility Total Score: 16       Treatment & Education:  Static stand at sink x 2 mins with SBA for teeth brushing  Gait training as noted    Patient left HOB elevated with all lines intact, call button in reach, and bed alarm on..    GOALS:   Multidisciplinary Problems       Physical Therapy Goals          Problem: Physical Therapy    Goal Priority Disciplines Outcome Interventions   Physical Therapy Goal     PT, PT/OT Progressing    Description: Goals to be met by: 25    Patient will increase functional independence with mobility by performin) Supine<>Sit with RW, Mod I  2) Sit <>Stand with RW, Mod I   3) Bed <>Chair with RW, Mod I   4) Pt to ambulate 150 feet with RW Mod I.                             DME Justifications:  Jarrett Charlton Jr. has a mobility limitation that significantly impairs his ability to participate in one or more mobility related activities  of daily living (MRADLs) such as toileting, feeding, dressing, grooming, and bathing in customary locations in the home.  The mobility limitation cannot be sufficiently resolved by the use of a cane or walker.   The use of a manual wheelchair will significantly improve the patients ability to participate in MRADLS and the patient will use it on regular basis in the home.  Jarrett Charlton JrLuna has expressed his willingness to use a manual wheelchair in the home. Patients upper body strength is sufficient for propulsion.  He also has a caregiver who is available, willing, and able to provide assistance with the wheelchair when needed.      Time Tracking:     PT Received On: 05/29/25  PT Start Time: 1129     PT Stop Time: 1147  PT Total Time (min): 18 min     Billable Minutes: Gait Training 18    Treatment Type: Treatment  PT/PTA: PTA     Number of PTA visits since last PT visit: 1 05/29/2025

## 2025-05-29 NOTE — PLAN OF CARE
Problem: Adult Inpatient Plan of Care  Goal: Plan of Care Review  Outcome: Progressing  Goal: Patient-Specific Goal (Individualized)  Outcome: Progressing  Goal: Absence of Hospital-Acquired Illness or Injury  Outcome: Progressing  Goal: Optimal Comfort and Wellbeing  Outcome: Progressing  Goal: Readiness for Transition of Care  Outcome: Progressing     Problem: Diabetes Comorbidity  Goal: Blood Glucose Level Within Targeted Range  Outcome: Progressing     Problem: Sepsis/Septic Shock  Goal: Optimal Coping  Outcome: Progressing  Goal: Absence of Bleeding  Outcome: Progressing  Goal: Blood Glucose Level Within Targeted Range  Outcome: Progressing  Goal: Absence of Infection Signs and Symptoms  Outcome: Progressing  Goal: Optimal Nutrition Intake  Outcome: Progressing     Problem: Wound  Goal: Optimal Coping  Outcome: Progressing  Goal: Optimal Functional Ability  Outcome: Progressing  Goal: Absence of Infection Signs and Symptoms  Outcome: Progressing  Goal: Improved Oral Intake  Outcome: Progressing  Goal: Optimal Pain Control and Function  Outcome: Progressing  Goal: Skin Health and Integrity  Outcome: Progressing  Goal: Optimal Wound Healing  Outcome: Progressing     Problem: Fall Injury Risk  Goal: Absence of Fall and Fall-Related Injury  Outcome: Progressing     Problem: Skin Injury Risk Increased  Goal: Skin Health and Integrity  Outcome: Progressing     Problem: Coping Ineffective  Goal: Effective Coping  Outcome: Progressing     Problem: Hemodialysis  Goal: Safe, Effective Therapy Delivery  Outcome: Progressing  Goal: Effective Tissue Perfusion  Outcome: Progressing  Goal: Absence of Infection Signs and Symptoms  Outcome: Progressing     Problem: Infection  Goal: Absence of Infection Signs and Symptoms  Outcome: Progressing     Problem: Pain Acute  Goal: Optimal Pain Control and Function  Outcome: Progressing    Dialysis completed. Well tolerated. 500 cc pulled out. POC reviewed with pt. Questions and  concerns addressed. Bed lowered  and personal belongings within reach. Safety maintained this shift. Please see flowsheet for VS and full assessment.

## 2025-05-29 NOTE — SUBJECTIVE & OBJECTIVE
Interval History: Bleeding for dialysis catheter resolved.  Right knee pain persists.  Back/neck pain doing well.    Review of Systems   Constitutional:  Negative for chills and fever.   Respiratory:  Negative for shortness of breath.    Cardiovascular:  Negative for chest pain.   Gastrointestinal:  Negative for abdominal pain, constipation, diarrhea, nausea and vomiting.   Musculoskeletal:  Positive for arthralgias and back pain.     Objective:     Vital Signs (Most Recent):  Temp: 97.6 °F (36.4 °C) (05/29/25 1246)  Pulse: (!) 55 (05/29/25 1502)  Resp: 18 (05/29/25 1246)  BP: (!) 159/71 (05/29/25 1246)  SpO2: 98 % (05/29/25 1246) Vital Signs (24h Range):  Temp:  [97.6 °F (36.4 °C)-98.7 °F (37.1 °C)] 97.6 °F (36.4 °C)  Pulse:  [52-89] 55  Resp:  [16-20] 18  SpO2:  [97 %-100 %] 98 %  BP: (113-159)/(54-71) 159/71     Weight: 78.9 kg (173 lb 15.1 oz)  Body mass index is 22.33 kg/m².    Intake/Output Summary (Last 24 hours) at 5/29/2025 1658  Last data filed at 5/29/2025 1259  Gross per 24 hour   Intake 880 ml   Output 650 ml   Net 230 ml         Physical Exam  Constitutional:       Appearance: He is ill-appearing.   Cardiovascular:      Rate and Rhythm: Normal rate and regular rhythm.      Heart sounds: Normal heart sounds. No murmur heard.     No friction rub. No gallop.      Comments: Tunneled hemodialysis catheter to right chest wall, some blood around dressing despite recent replacement dressing.  Pulmonary:      Effort: Pulmonary effort is normal. No respiratory distress.      Breath sounds: Normal breath sounds. No wheezing or rales.   Abdominal:      General: Bowel sounds are normal. There is no distension.      Palpations: Abdomen is soft.      Tenderness: There is no abdominal tenderness.      Comments:  Colostomy with stool in bag.   Musculoskeletal:         General: Tenderness present. Normal range of motion.      Comments: Right knee mildly warm and tender to touch without erythema   Skin:     General: Skin  is warm and dry.      Coloration: Skin is not pale.      Findings: No erythema or rash.   Neurological:      Mental Status: He is alert and oriented to person, place, and time.               Significant Labs: All pertinent labs within the past 24 hours have been reviewed.    Significant Imaging: I have reviewed all pertinent imaging results/findings within the past 24 hours.

## 2025-05-29 NOTE — ASSESSMENT & PLAN NOTE
Doppler US of left leg without any evidence of DVT presently.    Anticoagulation held for tunneled line placement..  Bleeding from dialysis catheter resolved.  Restarted apixaban today.

## 2025-05-29 NOTE — ASSESSMENT & PLAN NOTE
-On admit he denies any sob despite elevated BNP and peripheral edema.  Denies chest pain.  Troponin negative.  -Echo 2/25/25 showed EF 30%, indeterminate diastolic function, mild LA dilation, mild/moderate MR  -Strict ins/outs and daily weights with low sodium fluid restricted diet  -Continue statin, midodrine and metoprolol.  Restarted apixaban today.

## 2025-05-30 LAB
ABSOLUTE EOSINOPHIL (OHS): 0 K/UL
ABSOLUTE MONOCYTE (OHS): 0.12 K/UL (ref 0.3–1)
ABSOLUTE NEUTROPHIL COUNT (OHS): 7.15 K/UL (ref 1.8–7.7)
ANION GAP (OHS): 11 MMOL/L (ref 8–16)
APTT PPP: 26.8 SECONDS (ref 21–32)
BASOPHILS # BLD AUTO: 0 K/UL
BASOPHILS NFR BLD AUTO: 0 %
BUN SERPL-MCNC: 41 MG/DL (ref 8–23)
CALCIUM SERPL-MCNC: 7.7 MG/DL (ref 8.7–10.5)
CHLORIDE SERPL-SCNC: 98 MMOL/L (ref 95–110)
CO2 SERPL-SCNC: 25 MMOL/L (ref 23–29)
CREAT SERPL-MCNC: 3 MG/DL (ref 0.5–1.4)
ERYTHROCYTE [DISTWIDTH] IN BLOOD BY AUTOMATED COUNT: 16.7 % (ref 11.5–14.5)
GFR SERPLBLD CREATININE-BSD FMLA CKD-EPI: 21 ML/MIN/1.73/M2
GLUCOSE SERPL-MCNC: 162 MG/DL (ref 70–110)
HCT VFR BLD AUTO: 26.3 % (ref 40–54)
HGB BLD-MCNC: 8 GM/DL (ref 14–18)
IMM GRANULOCYTES # BLD AUTO: 0.03 K/UL (ref 0–0.04)
IMM GRANULOCYTES NFR BLD AUTO: 0.4 % (ref 0–0.5)
LYMPHOCYTES # BLD AUTO: 0.17 K/UL (ref 1–4.8)
MAGNESIUM SERPL-MCNC: 1.8 MG/DL (ref 1.6–2.6)
MCH RBC QN AUTO: 30 PG (ref 27–31)
MCHC RBC AUTO-ENTMCNC: 30.4 G/DL (ref 32–36)
MCV RBC AUTO: 99 FL (ref 82–98)
NUCLEATED RBC (/100WBC) (OHS): 0 /100 WBC
PHOSPHATE SERPL-MCNC: 4.1 MG/DL (ref 2.7–4.5)
PLATELET # BLD AUTO: 135 K/UL (ref 150–450)
PMV BLD AUTO: 10 FL (ref 9.2–12.9)
POCT GLUCOSE: 182 MG/DL (ref 70–110)
POCT GLUCOSE: 194 MG/DL (ref 70–110)
POCT GLUCOSE: 197 MG/DL (ref 70–110)
POCT GLUCOSE: 237 MG/DL (ref 70–110)
POTASSIUM SERPL-SCNC: 5.2 MMOL/L (ref 3.5–5.1)
RBC # BLD AUTO: 2.67 M/UL (ref 4.6–6.2)
RELATIVE EOSINOPHIL (OHS): 0 %
RELATIVE LYMPHOCYTE (OHS): 2.3 % (ref 18–48)
RELATIVE MONOCYTE (OHS): 1.6 % (ref 4–15)
RELATIVE NEUTROPHIL (OHS): 95.7 % (ref 38–73)
SARS-COV-2 RDRP RESP QL NAA+PROBE: NEGATIVE
SODIUM SERPL-SCNC: 134 MMOL/L (ref 136–145)
VANCOMYCIN SERPL-MCNC: 14.7 UG/ML (ref ?–80)
WBC # BLD AUTO: 7.47 K/UL (ref 3.9–12.7)

## 2025-05-30 PROCEDURE — 84100 ASSAY OF PHOSPHORUS: CPT | Performed by: HOSPITALIST

## 2025-05-30 PROCEDURE — 63600175 PHARM REV CODE 636 W HCPCS: Performed by: HOSPITALIST

## 2025-05-30 PROCEDURE — 21400001 HC TELEMETRY ROOM

## 2025-05-30 PROCEDURE — 94761 N-INVAS EAR/PLS OXIMETRY MLT: CPT

## 2025-05-30 PROCEDURE — 25000003 PHARM REV CODE 250: Performed by: HOSPITALIST

## 2025-05-30 PROCEDURE — A4216 STERILE WATER/SALINE, 10 ML: HCPCS | Performed by: HOSPITALIST

## 2025-05-30 PROCEDURE — 85025 COMPLETE CBC W/AUTO DIFF WBC: CPT | Performed by: HOSPITALIST

## 2025-05-30 PROCEDURE — A4216 STERILE WATER/SALINE, 10 ML: HCPCS | Performed by: EMERGENCY MEDICINE

## 2025-05-30 PROCEDURE — 85730 THROMBOPLASTIN TIME PARTIAL: CPT | Performed by: HOSPITALIST

## 2025-05-30 PROCEDURE — 25000003 PHARM REV CODE 250: Performed by: NURSE PRACTITIONER

## 2025-05-30 PROCEDURE — 82310 ASSAY OF CALCIUM: CPT | Performed by: HOSPITALIST

## 2025-05-30 PROCEDURE — 25000003 PHARM REV CODE 250: Performed by: EMERGENCY MEDICINE

## 2025-05-30 PROCEDURE — 36415 COLL VENOUS BLD VENIPUNCTURE: CPT | Performed by: HOSPITALIST

## 2025-05-30 PROCEDURE — 80202 ASSAY OF VANCOMYCIN: CPT | Performed by: HOSPITALIST

## 2025-05-30 PROCEDURE — 83735 ASSAY OF MAGNESIUM: CPT | Performed by: HOSPITALIST

## 2025-05-30 PROCEDURE — U0002 COVID-19 LAB TEST NON-CDC: HCPCS | Performed by: HOSPITALIST

## 2025-05-30 RX ORDER — CALCIUM CARBONATE 200(500)MG
500 TABLET,CHEWABLE ORAL 2 TIMES DAILY PRN
Status: DISCONTINUED | OUTPATIENT
Start: 2025-05-30 | End: 2025-06-04 | Stop reason: HOSPADM

## 2025-05-30 RX ORDER — METHYLPREDNISOLONE 4 MG/1
4 TABLET ORAL ONCE
Status: COMPLETED | OUTPATIENT
Start: 2025-06-04 | End: 2025-06-04

## 2025-05-30 RX ORDER — METHYLPREDNISOLONE 4 MG/1
4 TABLET ORAL ONCE
Status: COMPLETED | OUTPATIENT
Start: 2025-06-01 | End: 2025-06-01

## 2025-05-30 RX ORDER — METHYLPREDNISOLONE 4 MG/1
4 TABLET ORAL ONCE
Status: COMPLETED | OUTPATIENT
Start: 2025-06-02 | End: 2025-06-02

## 2025-05-30 RX ORDER — METHYLPREDNISOLONE 4 MG/1
4 TABLET ORAL ONCE
Status: COMPLETED | OUTPATIENT
Start: 2025-05-31 | End: 2025-05-31

## 2025-05-30 RX ORDER — METHYLPREDNISOLONE 4 MG/1
4 TABLET ORAL ONCE
Status: COMPLETED | OUTPATIENT
Start: 2025-06-03 | End: 2025-06-03

## 2025-05-30 RX ORDER — METHYLPREDNISOLONE 4 MG/1
4 TABLET ORAL ONCE
Status: COMPLETED | OUTPATIENT
Start: 2025-05-30 | End: 2025-05-30

## 2025-05-30 RX ORDER — METHYLPREDNISOLONE 4 MG/1
8 TABLET ORAL ONCE
Status: COMPLETED | OUTPATIENT
Start: 2025-05-30 | End: 2025-05-30

## 2025-05-30 RX ORDER — METHYLPREDNISOLONE 4 MG/1
8 TABLET ORAL ONCE
Status: COMPLETED | OUTPATIENT
Start: 2025-05-31 | End: 2025-05-31

## 2025-05-30 RX ORDER — MIDODRINE HYDROCHLORIDE 5 MG/1
5 TABLET ORAL EVERY 12 HOURS
Status: DISCONTINUED | OUTPATIENT
Start: 2025-05-30 | End: 2025-06-02

## 2025-05-30 RX ADMIN — CEFEPIME 1 G: 1 INJECTION, POWDER, FOR SOLUTION INTRAMUSCULAR; INTRAVENOUS at 04:05

## 2025-05-30 RX ADMIN — BRIMONIDINE TARTRATE 1 DROP: 1.5 SOLUTION/ DROPS OPHTHALMIC at 08:05

## 2025-05-30 RX ADMIN — APIXABAN 2.5 MG: 2.5 TABLET, FILM COATED ORAL at 09:05

## 2025-05-30 RX ADMIN — METHYLPREDNISOLONE 4 MG: 4 TABLET ORAL at 05:05

## 2025-05-30 RX ADMIN — DEXAMETHASONE SODIUM PHOSPHATE 10 MG: 10 INJECTION INTRAMUSCULAR; INTRAVENOUS at 06:05

## 2025-05-30 RX ADMIN — LEVOTHYROXINE SODIUM 25 MCG: 25 TABLET ORAL at 06:05

## 2025-05-30 RX ADMIN — Medication 10 ML: at 06:05

## 2025-05-30 RX ADMIN — ASCORBIC ACID, THIAMINE MONONITRATE,RIBOFLAVIN, NIACINAMIDE, PYRIDOXINE HYDROCHLORIDE, FOLIC ACID, CYANOCOBALAMIN, BIOTIN, CALCIUM PANTOTHENATE, 1 CAPSULE: 100; 1.5; 1.7; 20; 10; 1; 6000; 150000; 5 CAPSULE, LIQUID FILLED ORAL at 08:05

## 2025-05-30 RX ADMIN — MIDODRINE HYDROCHLORIDE 5 MG: 5 TABLET ORAL at 08:05

## 2025-05-30 RX ADMIN — PRAVASTATIN SODIUM 40 MG: 20 TABLET ORAL at 08:05

## 2025-05-30 RX ADMIN — Medication 10 ML: at 09:05

## 2025-05-30 RX ADMIN — MUPIROCIN: 20 OINTMENT TOPICAL at 08:05

## 2025-05-30 RX ADMIN — PANTOPRAZOLE SODIUM 40 MG: 40 TABLET, DELAYED RELEASE ORAL at 08:05

## 2025-05-30 RX ADMIN — Medication 10 ML: at 04:05

## 2025-05-30 RX ADMIN — FERROUS SULFATE TAB 325 MG (65 MG ELEMENTAL FE) 1 EACH: 325 (65 FE) TAB at 08:05

## 2025-05-30 RX ADMIN — TAMSULOSIN HYDROCHLORIDE 0.4 MG: 0.4 CAPSULE ORAL at 08:05

## 2025-05-30 RX ADMIN — MIDODRINE HYDROCHLORIDE 5 MG: 5 TABLET ORAL at 09:05

## 2025-05-30 RX ADMIN — GABAPENTIN 200 MG: 100 CAPSULE ORAL at 09:05

## 2025-05-30 RX ADMIN — MUPIROCIN: 20 OINTMENT TOPICAL at 09:05

## 2025-05-30 RX ADMIN — LIDOCAINE 1 PATCH: 50 PATCH CUTANEOUS at 08:05

## 2025-05-30 RX ADMIN — APIXABAN 2.5 MG: 2.5 TABLET, FILM COATED ORAL at 08:05

## 2025-05-30 RX ADMIN — METHYLPREDNISOLONE 8 MG: 4 TABLET ORAL at 09:05

## 2025-05-30 RX ADMIN — BRIMONIDINE TARTRATE 1 DROP: 1.5 SOLUTION/ DROPS OPHTHALMIC at 09:05

## 2025-05-30 RX ADMIN — METHYLPREDNISOLONE 4 MG: 4 TABLET ORAL at 09:05

## 2025-05-30 RX ADMIN — METOPROLOL SUCCINATE 12.5 MG: 25 TABLET, EXTENDED RELEASE ORAL at 08:05

## 2025-05-30 RX ADMIN — AMIODARONE HYDROCHLORIDE 200 MG: 200 TABLET ORAL at 08:05

## 2025-05-30 RX ADMIN — SEVELAMER CARBONATE 800 MG: 800 TABLET, FILM COATED ORAL at 11:05

## 2025-05-30 RX ADMIN — CALCIUM CARBONATE 500 MG: 500 TABLET, CHEWABLE ORAL at 05:05

## 2025-05-30 RX ADMIN — MORPHINE SULFATE 2 MG: 2 INJECTION, SOLUTION INTRAMUSCULAR; INTRAVENOUS at 12:05

## 2025-05-30 RX ADMIN — SEVELAMER CARBONATE 800 MG: 800 TABLET, FILM COATED ORAL at 05:05

## 2025-05-30 RX ADMIN — Medication 10 ML: at 05:05

## 2025-05-30 RX ADMIN — SEVELAMER CARBONATE 800 MG: 800 TABLET, FILM COATED ORAL at 08:05

## 2025-05-30 NOTE — PT/OT/SLP PROGRESS
Occupational Therapy      Patient Name:  Jarrett Charlton Jr.   MRN:  [id number]    Patient not seen today secondary to  (Pt reporting he wants to sleep and politely declned therapy session.). Will follow-up later today if schedule permits.    5/30/2025

## 2025-05-30 NOTE — ASSESSMENT & PLAN NOTE
-Noted history - ?pAfib? And plans for ablation at [facility] [place] 7/15/25.  -Continue home amiodarone and metoprolol  Restarted apixaban

## 2025-05-30 NOTE — PROGRESS NOTES
"[address] Medicine  Progress Note    Patient Name: Jarrett Charlton Jr.  MRN: [id number]  Patient Class: IP- Inpatient   Admission Date: 5/26/2025  Length of Stay: 3 days  Attending Physician: Darrell Cheema MD  Primary Care Provider: Olga Hampton MD        Subjective     Principal Problem:Cervical spinal stenosis        HPI:  As per Poli Damon MD:    "Mr. Charlton is a 75 year old man with autonomic dysfunction on midodrine, coronary artery disease, progressing CKDV, BPH prostate cancer s/p TURP, BPH, cervical disk disease (s/p spinal fusion in the 80s), ulcerative colitis (s/p colectomy and illiostomy, ventricular tachycardia / ?atrial fibrillation s/p ablation who presented for evaluation of severe pain in his right shoulder / neck and left groin.  History is obtained by patient and his daughter at bedside.  They note a fall several months ago with abrasion to his lumbar back and shins.  He also had a fall 3 days ago which he states occurred when he was turning and tripped over his shoes.  He didn't hit his head, but did fall onto his right hand/arm.  He had no severe pain at that time.  He states that yesterday he woke up feeling ok, but as the day progressed he developed severe left inner groin pain.  The pain has been progressively worse throughout the day and is exacerbated by walking and laying down.  He also reports developing severe pain in his neck starting at the posterior base of his cranium and radiating down to his right shoulder.  The pain is severe and also worse with laying flat.  He describes the neck/shoulder pain as throbbing and intense.  He reports baseline dyspnea on exertion which is stable.  He denies fevers, nausea, vomiting, diarrhea, diminished urine output, cough, increased leg swelling, diminished oral intake, palpitations, chest pain and abdominal pain.  In the ED he was treated with methocarbamol, morphine and oxycodone with moderate relief " "of his pain.      Of note, patient recently treated at [facility] [place] for campylobacter gastroenteritis and enterobacter bacteremia (discharged 4/28).  [place] [facility] [place] and hyperkalemia and discharged 5/18 with referral to vascular surgery for AV graft placement.  His daughter reports it is planned to be placed in July.  Daughter also reports planned ERCP/EUS for pancreatic lesion in July and cardiac ablation planned for July 15th."    Overview/Hospital Course:  Patient is 75-year-old man with a non dysfunction on midodrine, coronary artery disease, history of arrhythmias including atrial fibrillation and ventricular tachycardia,  chronic kidney disease stage 5, ulcerative colitis status post remote colectomy with ileostomy, cervical spinal disease status post remote spinal fusion who presents with severe pain in the right shoulder and left groin along with evidence of worsening renal function.    Nephrology consult recommended initiating hemodialysis.  A tunneled dialysis catheter placed today.    Recent imaging showed evidence of small acute left frontal stroke likely unrelated to current presentation.  Evaluated by neurology recommended continuing anticoagulation.    Dr. Adan Santiago (Spine Orthopedic Surgery) evaluated patient and recommended high-dose steroids to treat spinal stenosis along with therapy.    Patient also underwent an arthrocentesis of his right knee.  Patient with 50,000 white cells with neutrophil predominance.  Gram stain negative.    Cultures submitted.    Interval History: Right knee pain persists.  Back/neck pain doing well.    Review of Systems   Constitutional:  Negative for chills and fever.   Respiratory:  Negative for shortness of breath.    Cardiovascular:  Negative for chest pain.   Gastrointestinal:  Negative for abdominal pain, constipation, diarrhea, nausea and vomiting.   Musculoskeletal:  Positive for arthralgias and back pain. "     Objective:     Vital Signs (Most Recent):  Temp: 97.8 °F (36.6 °C) (05/30/25 1238)  Pulse: (!) 58 (05/30/25 1238)  Resp: 18 (05/30/25 1238)  BP: (!) 166/72 (05/30/25 1238)  SpO2: 98 % (05/30/25 1238) Vital Signs (24h Range):  Temp:  [97.7 °F (36.5 °C)-98.7 °F (37.1 °C)] 97.8 °F (36.6 °C)  Pulse:  [54-65] 58  Resp:  [16-20] 18  SpO2:  [97 %-100 %] 98 %  BP: (125-166)/(63-72) 166/72     Weight: 78.9 kg (173 lb 15.1 oz)  Body mass index is 22.33 kg/m².    Intake/Output Summary (Last 24 hours) at 5/30/2025 1357  Last data filed at 5/30/2025 0833  Gross per 24 hour   Intake 850 ml   Output 2000 ml   Net -1150 ml         Physical Exam  Constitutional:       Appearance: He is ill-appearing.   Cardiovascular:      Rate and Rhythm: Normal rate and regular rhythm.      Heart sounds: Normal heart sounds. No murmur heard.     No friction rub. No gallop.      Comments: Tunneled hemodialysis catheter to right chest wall, without bleeding.  Pulmonary:      Effort: Pulmonary effort is normal. No respiratory distress.      Breath sounds: Normal breath sounds. No wheezing or rales.   Abdominal:      General: Bowel sounds are normal. There is no distension.      Palpations: Abdomen is soft.      Tenderness: There is no abdominal tenderness.      Comments:  Colostomy with stool in bag.   Musculoskeletal:         General: Tenderness present. Normal range of motion.      Comments: Right knee mildly warm and tender to touch without erythema   Skin:     General: Skin is warm and dry.      Coloration: Skin is not pale.      Findings: No erythema or rash.   Neurological:      Mental Status: He is alert and oriented to person, place, and time.               Significant Labs: All pertinent labs within the past 24 hours have been reviewed.    Significant Imaging: I have reviewed all pertinent imaging results/findings within the past 24 hours.      Assessment & Plan  Cervical spinal stenosis  Neck pain on right side  Effusion of right  knee  Patient presents with significant worsening debility from baseline. MRI C-spine shows severe degenerative changes producing severe foraminal and central canal stenosis from C2 through C5 and severe left foraminal stenosis at C5-6.  MRI soft tissue neck shows no soft tissue mass or lymphadenopathy throughout the soft tissues of the neck.     Dr. Adan Santiago (Spine Orthopedic Surgery) evaluated patient and recommended high-dose steroids to treat spinal stenosis along with therapy.    Patient also underwent an arthrocentesis of his right knee.  Patient with 50,000 white cells with neutrophil predominance.  Gram stain negative.   Dr. Merrill suspect infusion likely inflammatory and not likely a septic joint.   Cultures submitted and started on empiric antibiotic therapy pending culture results.    Okay for colchicine per Nephrology.  We will dose see if that also helps with as needed pain.  Right knee pain continues to be an issue.  Radiograph threatening shows no acute fracture but moderate suprapatellar joint infusion.  May benefit from further aspiration.  Will discuss with Dr. Merrill.  History of deep venous thrombosis (DVT) of distal vein of left lower extremity  Doppler US of left leg without any evidence of DVT presently.    Anticoagulation held for tunneled line placement..  Bleeding from dialysis catheter resolved.  Restarted apixaban.    Falls  Debility  Autonomic dysfunction  Acute ischemic stroke  Orthostatic hypotension  -Noted at least 2 falls recently and saw Dr. Patel with neurology a few days ago.  His note was reviewed, noted concern for possible stroke and I ordered his suggested imaging workup.    -Suspect falls and debility are multifactorial.  -MRI brain notable for punctate focus diffusion restriction about the left frontal subcortical white matter, in keeping with a small acute infarct. No significant surrounding edema or mass effect. No evidence for acute intracranial  hemorrhage.   -MRA brain was normal  Echocardiogram did not show thrombolytic source.  Evaluated by neurology recommended continuing anticoagulation.    Anticoagulation held for  hemodialysis line placement.  Restarted on apixaban.  Acute renal failure with acute tubular necrosis superimposed on stage 5 chronic kidney disease, not on chronic dialysis  Dialysis line placement and HD initiation this hospitalization.  Patient underwent dialysis yesterday.  Metabolic derangements improved.   Acute on chronic combined systolic and diastolic congestive heart failure  Essential hypertension  CAD (coronary artery disease)  -On admit he denies any sob despite elevated BNP and peripheral edema.  Denies chest pain.  Troponin negative.  -Echo 2/25/25 showed EF 30%, indeterminate diastolic function, mild LA dilation, mild/moderate MR  -Strict ins/outs and daily weights with low sodium fluid restricted diet  -Continue statin, midodrine and metoprolol.  Restarted apixaban.  Atrial paroxysmal tachycardia  -Noted history - ?pAfib? And plans for ablation at [facility] [place] 7/15/25.  -Continue home amiodarone and metoprolol  Restarted apixaban  Thrombocytopenia  Mild, stable.  T2DM (type 2 diabetes mellitus)  -A1c 5.7  -Home regimen includes glimepiride - holding for now  -Sugars reasonably controlled  -Will treat with SSI ac/hs for now  History of ulcerative colitis  High output ileostomy  -Noted history  -Ostomy functioning as it should per patient.  Moderate protein-calorie malnutrition  -Nutrition consulted. Most recent weight and BMI monitored-   -Measurements:  Wt Readings from Last 1 Encounters:   05/29/25 78.9 kg (173 lb 15.1 oz)   Body mass index is 22.33 kg/m².  Gout  -Noted history  -Hold prior colchicine.    Hypothyroidism  -Continue home levothyroxine  Type 2 diabetes mellitus  Reasonably controlled with current regimen.  Will continue with current regimen and continue to monitor.  ACP (advance care planning)  -Advance Care Planning  Date: 05/26/2025 I engaged patient and his daughter, at bedside, in discussion surrounding his goals of care at the end of life.  We discussed the pros and cons of aggressive treatment in the face of cardiac arrest.  He wishes to continue with aggressive medical care, including dialysis and multiple upcoming procedures.  However, if he were to suffer a cardiac arrest he wishes to be allowed a peaceful death.  I explained that this is consistent with DNR and he was in agreement.  DNR order placed.  I spent 10 minutes ACP time today.  VTE Risk Mitigation (From admission, onward)           Ordered     apixaban tablet 2.5 mg  2 times daily         05/29/25 0930     heparin (porcine) injection 4,000 Units  As needed (PRN)         05/28/25 1306     Place sequential compression device  Until discontinued         05/26/25 0808     IP VTE HIGH RISK PATIENT  Once         05/26/25 0808     Place sequential compression device  Until discontinued         05/26/25 0546                    Darrell Cheema MD  Department of Hospital Medicine   [facility] ([address])

## 2025-05-30 NOTE — PROGRESS NOTES
"Nephrology  Progress Note    Admit Date: 5/26/2025   LOS: 3 days     SUBJECTIVE:     Follow-up For:  GRADY    History of Present Illness:  Patient is a 75 y.o. male presents with neck/shoulder pain.  W/up in process and preliminarily MRI with acute left frontal infarct.  Labs significant for worsening GRADY/CKD (Creat 7.8, BUN 94, BNP 1253).  Pt with extensive med hx as outlined below and known to me from recent admission.  Significant renal disease and followed by [facility].  Has had rapid progression of CKD over past year with creat steadily rising 3-4-5-6-7.  Has been referred to vascular for AVF and transplant.  Seen after multiple testings.  Looks older than stated age,  frail, etc.  C/o pain in neck, head.       EPIC reviewed.  Case discussed with team.          Interval History:     s/p 3 HD sessions w/o issue.  Still with right knee pains.  Discussed with team.  Awaiting DC plans.     Review of Systems:  Constitutional: No fever or chills  Respiratory: No cough or shortness of breath  Cardiovascular: No chest pain or palpitations  Gastrointestinal: No nausea or vomiting  Neurological: No confusion or weakness    OBJECTIVE:     Vital Signs Range (Last 24H):  BP (!) 161/69   Pulse 60   Temp 98 °F (36.7 °C) (Oral)   Resp 18   Ht 6' 2" (1.88 m)   Wt 78.9 kg (173 lb 15.1 oz)   SpO2 98%   BMI 22.33 kg/m²     Temp:  [97.6 °F (36.4 °C)-98.7 °F (37.1 °C)]   Pulse:  [53-89]   Resp:  [16-20]   BP: (125-161)/(63-71)   SpO2:  [97 %-100 %]     I & O (Last 24H):  Intake/Output Summary (Last 24 hours) at 5/30/2025 0902  Last data filed at 5/30/2025 0630  Gross per 24 hour   Intake 830 ml   Output 2400 ml   Net -1570 ml       Physical Exam:  General appearance:  NAD this am.  Eyes:  Conjunctivae/corneas clear. PERRL.  Lungs: Normal respiratory effort,   clear to auscultation bilaterally   Heart: Regular rate and rhythm, S1, S2 normal, 3/6 murmur  Abdomen: Soft, nontender, ostomy + bowel sounds  Extremities: No " cyanosis or clubbing.  trace+ edema.    Right knee painful.   Skin: Skin color pale, texture, turgor normal. No rashes or lesions  Neurological:  nonfocal  RIJ ROBINSON.       Laboratory Data:  Recent Labs   Lab 05/30/25  0507   WBC 7.47   RBC 2.67*   HGB 8.0*   HCT 26.3*   *   MCV 99*   MCH 30.0   MCHC 30.4*       BMP:   Recent Labs   Lab 05/30/25  0507   *   *   K 5.2*   CL 98   CO2 25   BUN 41*   CREATININE 3.0*   CALCIUM 7.7*   MG 1.8   PHOS 4.1     Lab Results   Component Value Date    CALCIUM 7.7 (L) 05/30/2025    PHOS 4.1 05/30/2025       Lab Results   Component Value Date    .4 (H) 05/16/2025    CALCIUM 7.7 (L) 05/30/2025    PHOS 4.1 05/30/2025       Lab Results   Component Value Date    URICACID 7.9 (H) 10/10/2024       BNP  Recent Labs   Lab 05/26/25  0350   BNP 1,253*       Medications:  Medication list was reviewed and changes noted under Assessment/Plan.    Diagnostic Results:    IR Tunneled Catheter Insert w/o Port   Final Result      Insertion of right -sided supradiaphragmatic dual- lumen tunneled hemodialysis catheter, with tip in the expected location of the cavoatrial junction.      Plan:      The catheter may be used immediately.      _______________________________________________________________         Electronically signed by: Jamie Stokes MD   Date:    05/27/2025   Time:    13:24      X-Ray Cervical Spine 5 View W Flex Extxt   Final Result      Chronic change as above.         Electronically signed by: Kurt Bustillo MD   Date:    05/27/2025   Time:    07:46      X-Ray Hip 2 or 3 views Left with Pelvis when performed   Final Result      Please see above.         Electronically signed by: Derrek Vega   Date:    05/26/2025   Time:    14:01      US Carotid Bilateral   Final Result      No evidence of a hemodynamically significant carotid bifurcation stenosis.         Electronically signed by: Derrek Vega   Date:    05/26/2025   Time:    11:23      MRI Soft Tissue Neck  Without Contrast   Final Result      No soft tissue mass or lymphadenopathy throughout the soft tissues of the neck.         Electronically signed by: Derrek Vega   Date:    05/26/2025   Time:    13:11      MRI Cervical Spine Without Contrast   Final Result      Severe degenerative changes as described producing severe foraminal and central canal stenosis from C2 through C5 and severe left foraminal stenosis at C5-6.         Electronically signed by: Jonathan Hill Jr   Date:    05/26/2025   Time:    12:16      MRA Brain without contrast   Final Result      MR angiogram of the head appears within normal limits.         Electronically signed by: Derrek Vega   Date:    05/26/2025   Time:    10:27      MRI Brain Without Contrast   Final Result   Abnormal      Punctate focus diffusion restriction about the left frontal subcortical white matter, in keeping with a small acute infarct.  No significant surrounding edema or mass effect.  No evidence for acute intracranial hemorrhage.      Sequela of microvascular ischemic change.      This report was flagged in Epic as abnormal.         Electronically signed by: Derrek Vega   Date:    05/26/2025   Time:    10:16      US Lower Extremity Veins Left   Final Result      No evidence of deep venous thrombosis in the left lower extremity.         Electronically signed by: Derrek Vega   Date:    05/26/2025   Time:    08:23      X-Ray Chest AP Portable   Final Result      Bibasilar atelectasis with chronic prominence of the pulmonary vascular markings.         Electronically signed by: Kiko Whitten MD   Date:    05/26/2025   Time:    06:26      X-Ray Knee 1 or 2 View Right    (Results Pending)       ASSESSMENT/PLAN:     Progressive worsening CKD IV-V secondary to CRS, severe vascular disease, mult episodes of GRADY with intravascular depletion with high ostomy output, diuretics, poor CO on midodrine, etc.  Now has transitioned to ESRD:  -followed closely by [facility]  -recently DC'd with  Creat 6.3/BUN 79  -referred to vascular and transplant.   -hold diuretics  -Creat 7.8/BUN 90  -was on eliquis but held with planned ROBINSON.  Hep drip off  -bicarb tabs for worsening AGMA (CKD/GI losses) but stop with HD  -5/27:  Ext discussion with pt about RRT options vs Palliative.  Wants to do HD in [place].  Salvaging left arm for AVF/AVG next month.  Consulted Dr. Middleton for ROBINSON today but IR had to place.  Start RRT, labs, and consult SW to start HD as outpt.   -5/28:  continue with gentle HD X 3 treatments and then transition to 3/week.  Monitored ROBINSON site as may need stitch but doing well  -5/29:  HD #3 today and then TTS.  Await outpt HD arrangements in [place].    -5/30:  transitioned to TTS.    -see orders.  -Renally dose meds, avoid nephrotoxins, and monitor I/O's closely.      Acute CVA:  -MRI noted.  -defer to neurology.      Spinal Stenosis:  -on decadron.   -defer     Multifactorial anemia:  -RODRIGUE cleared by neurology  -recent IV iron  -transfuse if needed.    MBD:  -nephrocaps/binders  -PTH at goal for CKD     Hypotension:  -cont midodrine but back down as much improved.     LLE DVT:  -on eliquis but held with bleeding.   -resumed      PAF and severe PA-HTN:  -defer     UC w/ ileostomy:  -not a candidate for PD     Right knee pain:  -s/p drain by Ortho  -not gout  -defer.      DISPO:     Multiple comorbidities  DNR        Thanks for consult  See above  Will follow along.        High MDM complexity necessary to treat or prevent imminent or life-threatening deterioration of the following conditions: ESRD  Time spent personally by me on the following activities 50 min: development of treatment plan with patient or surrogate, discussions with consultants, interpretation of cardiac output measurements, examination of patient, ordering and performing treatments and interventions, evaluation of patient's response to treatment, obtaining history from patient or surrogate, ordering and review of laboratory  studies, ordering and review of radiographic studies, re-evaluation of patient's condition, pulse oximetry and blood draw for specimens

## 2025-05-30 NOTE — PLAN OF CARE
Problem: Adult Inpatient Plan of Care  Goal: Plan of Care Review  Flowsheets (Taken 5/30/2025 0101)  Plan of Care Reviewed With: patient  Goal: Absence of Hospital-Acquired Illness or Injury  Intervention: Identify and Manage Fall Risk  Flowsheets (Taken 5/30/2025 0101)  Safety Promotion/Fall Prevention: Fall Risk reviewed with patient/family  Intervention: Prevent Skin Injury  Flowsheets (Taken 5/30/2025 0101)  Body Position: position changed independently  Goal: Optimal Comfort and Wellbeing  Intervention: Monitor Pain and Promote Comfort  Flowsheets (Taken 5/30/2025 0101)  Pain Management Interventions: pain management plan reviewed with patient/caregiver  Intervention: Provide Person-Centered Care  Flowsheets (Taken 5/30/2025 0101)  Trust Relationship/Rapport:   questions answered   questions encouraged   reassurance provided   thoughts/feelings acknowledged   care explained   choices provided     Problem: Diabetes Comorbidity  Goal: Blood Glucose Level Within Targeted Range  Intervention: Monitor and Manage Glycemia  Flowsheets (Taken 5/30/2025 0101)  Glycemic Management:   blood glucose monitored   supplemental insulin given     Problem: Pain Acute  Goal: Optimal Pain Control and Function  Intervention: Develop Pain Management Plan  Flowsheets (Taken 5/30/2025 0101)  Pain Management Interventions: pain management plan reviewed with patient/caregiver  Intervention: Prevent or Manage Pain  Flowsheets (Taken 5/30/2025 0101)  Medication Review/Management: medications reviewed  Intervention: Optimize Psychosocial Wellbeing  Flowsheets (Taken 5/30/2025 0101)  Diversional Activities: television

## 2025-05-30 NOTE — SUBJECTIVE & OBJECTIVE
Interval History: Right knee pain persists.  Back/neck pain doing well.    Review of Systems   Constitutional:  Negative for chills and fever.   Respiratory:  Negative for shortness of breath.    Cardiovascular:  Negative for chest pain.   Gastrointestinal:  Negative for abdominal pain, constipation, diarrhea, nausea and vomiting.   Musculoskeletal:  Positive for arthralgias and back pain.     Objective:     Vital Signs (Most Recent):  Temp: 97.8 °F (36.6 °C) (05/30/25 1238)  Pulse: (!) 58 (05/30/25 1238)  Resp: 18 (05/30/25 1238)  BP: (!) 166/72 (05/30/25 1238)  SpO2: 98 % (05/30/25 1238) Vital Signs (24h Range):  Temp:  [97.7 °F (36.5 °C)-98.7 °F (37.1 °C)] 97.8 °F (36.6 °C)  Pulse:  [54-65] 58  Resp:  [16-20] 18  SpO2:  [97 %-100 %] 98 %  BP: (125-166)/(63-72) 166/72     Weight: 78.9 kg (173 lb 15.1 oz)  Body mass index is 22.33 kg/m².    Intake/Output Summary (Last 24 hours) at 5/30/2025 1357  Last data filed at 5/30/2025 0833  Gross per 24 hour   Intake 850 ml   Output 2000 ml   Net -1150 ml         Physical Exam  Constitutional:       Appearance: He is ill-appearing.   Cardiovascular:      Rate and Rhythm: Normal rate and regular rhythm.      Heart sounds: Normal heart sounds. No murmur heard.     No friction rub. No gallop.      Comments: Tunneled hemodialysis catheter to right chest wall, without bleeding.  Pulmonary:      Effort: Pulmonary effort is normal. No respiratory distress.      Breath sounds: Normal breath sounds. No wheezing or rales.   Abdominal:      General: Bowel sounds are normal. There is no distension.      Palpations: Abdomen is soft.      Tenderness: There is no abdominal tenderness.      Comments:  Colostomy with stool in bag.   Musculoskeletal:         General: Tenderness present. Normal range of motion.      Comments: Right knee mildly warm and tender to touch without erythema   Skin:     General: Skin is warm and dry.      Coloration: Skin is not pale.      Findings: No erythema or  rash.   Neurological:      Mental Status: He is alert and oriented to person, place, and time.               Significant Labs: All pertinent labs within the past 24 hours have been reviewed.    Significant Imaging: I have reviewed all pertinent imaging results/findings within the past 24 hours.

## 2025-05-30 NOTE — ASSESSMENT & PLAN NOTE
-On admit he denies any sob despite elevated BNP and peripheral edema.  Denies chest pain.  Troponin negative.  -Echo 2/25/25 showed EF 30%, indeterminate diastolic function, mild LA dilation, mild/moderate MR  -Strict ins/outs and daily weights with low sodium fluid restricted diet  -Continue statin, midodrine and metoprolol.  Restarted apixaban.

## 2025-05-30 NOTE — PROGRESS NOTES
Pharmacokinetic Assessment Follow Up: IV Vancomycin    Vancomycin serum concentration assessment(s):    The random level was drawn correctly and can be used to guide therapy at this time. The measurement is within the desired definitive target range of 10 to 20 mcg/mL.    Vancomycin Regimen Plan:    Re-dose when the random level is less than 25 mcg/mL, next level to be drawn at 0430 on 5/31 AM Labs ([facility])    Drug levels (last 3 results):  Recent Labs   Lab Result Units 05/29/25 0417 05/30/25  0507   Vancomycin Random ug/ml 7.6 14.7       Pharmacy will continue to follow and monitor vancomycin.    Please contact pharmacy at extension [phone] for questions regarding this assessment.    Thank you for the consult,   Amanda Bliss       Patient brief summary:  Jarrett Charlton Jr. is a 75 y.o. male initiated on antimicrobial therapy with IV Vancomycin for treatment of bone/joint infection    The patient's current regimen is pulse dosing post-HD based on pre-HD level drawn with AM labs on the morning of scheduled HD     Drug Allergies:   Review of patient's allergies indicates:   Allergen Reactions    Atorvastatin     Rosuvastatin        Actual Body Weight:   78.9 kg    Renal Function:   Estimated Creatinine Clearance: 23.4 mL/min (A) (based on SCr of 3 mg/dL (H)).,     Dialysis Method (if applicable):  intermittent HD  Transitioning to [facility] HD schedule     CBC (last 72 hours):  Recent Labs   Lab Result Units 05/27/25  1809 05/28/25 0351 05/29/25 0417 05/30/25  0507   WBC K/uL 10.46 9.04 8.82 7.47   HGB gm/dL 8.2* 7.6* 7.5* 8.0*   HCT % 25.2* 24.4* 23.8* 26.3*   Platelet Count K/uL 125* 128* 132* 135*   Lymph % % 4.0* 3.1* 2.7* 2.3*   Mono % % 5.4 2.7* 2.3* 1.6*   Eos % % 0.0 0.0 0.0 0.0   Basophil % % 0.1 0.1 0.1 0.0       Metabolic Panel (last 72 hours):  Recent Labs   Lab Result Units 05/28/25  0351 05/29/25  0417 05/30/25  0507   Sodium mmol/L 136 134* 134*   Potassium mmol/L 4.7 4.7 5.2*   Chloride mmol/L  103 99 98   CO2 mmol/L 20* 22* 25   Glucose mg/dL 202* 211* 162*   BUN mg/dL 65* 52* 41*   Creatinine mg/dL 5.3* 4.0* 3.0*   Magnesium  mg/dL 1.8 1.8 1.8   Phosphorus Level mg/dL 6.8* 5.3* 4.1       Vancomycin Administrations:  vancomycin given in the last 96 hours                     vancomycin (VANCOCIN) 1,000 mg in 0.9% NaCl 250 mL IVPB (admixture device) (mg) 1,000 mg New Bag 05/29/25 0632    vancomycin 1,500 mg in 0.9% NaCl 250 mL IVPB (admixture device) (mg) 1,500 mg New Bag 05/27/25 1826                    Microbiologic Results:  Microbiology Results (last 7 days)       Procedure Component Value Units Date/Time    Culture, Body Fluid (Aerobic) w/ GS [[id number]] Collected: 05/26/25 1908    Order Status: Completed Specimen: Joint Fluid, Right Knee Updated: 05/29/25 1143     CULTURE, FLUID No Growth To Date     GRAM STAIN Few WBC seen      No organisms seen    Fungus culture [[id number]]  (Normal) Collected: 05/26/25 1908    Order Status: Completed Specimen: Joint Fluid, Right Knee Updated: 05/29/25 1114     Fungal Culture Culture In Progress

## 2025-05-30 NOTE — ASSESSMENT & PLAN NOTE
-Noted at least 2 falls recently and saw Dr. Patel with neurology a few days ago.  His note was reviewed, noted concern for possible stroke and I ordered his suggested imaging workup.    -Suspect falls and debility are multifactorial.  -MRI brain notable for punctate focus diffusion restriction about the left frontal subcortical white matter, in keeping with a small acute infarct. No significant surrounding edema or mass effect. No evidence for acute intracranial hemorrhage.   -MRA brain was normal  Echocardiogram did not show thrombolytic source.  Evaluated by neurology recommended continuing anticoagulation.    Anticoagulation held for  hemodialysis line placement.  Restarted on apixaban.

## 2025-05-30 NOTE — PLAN OF CARE
Medicare Message     Important Message from Medicare regarding Discharge Appeal Rights Explained to patient/caregiver; Signed/date by patient/caregiver   Date IMM was signed 5/30/2025   Time IMM was signed [id number]

## 2025-05-30 NOTE — PT/OT/SLP PROGRESS
Physical Therapy      Patient Name:  Jarrett Charlton Jr.   MRN:  [id number]    Patient not seen today secondary to  (pt eating lunch). Will follow-up later today.    ADDENDUM 1:55pm - pt on his side, saying he just turned over to try to get some sleep and he's not getting up right now. Will follow up next treatment day.

## 2025-05-30 NOTE — ASSESSMENT & PLAN NOTE
Patient presents with significant worsening debility from baseline. MRI C-spine shows severe degenerative changes producing severe foraminal and central canal stenosis from C2 through C5 and severe left foraminal stenosis at C5-6.  MRI soft tissue neck shows no soft tissue mass or lymphadenopathy throughout the soft tissues of the neck.     Dr. Adan Santiago (Spine Orthopedic Surgery) evaluated patient and recommended high-dose steroids to treat spinal stenosis along with therapy.    Patient also underwent an arthrocentesis of his right knee.  Patient with 50,000 white cells with neutrophil predominance.  Gram stain negative.   Dr. Merrill suspect infusion likely inflammatory and not likely a septic joint.   Cultures submitted and started on empiric antibiotic therapy pending culture results.    Okay for colchicine per Nephrology.  We will dose see if that also helps with as needed pain.  Right knee pain continues to be an issue.  Radiograph threatening shows no acute fracture but moderate suprapatellar joint infusion.  May benefit from further aspiration.  Will discuss with Dr. Merrill.

## 2025-05-30 NOTE — PLAN OF CARE
Problem: Adult Inpatient Plan of Care  Goal: Plan of Care Review  Outcome: Progressing     Problem: Adult Inpatient Plan of Care  Goal: Patient-Specific Goal (Individualized)  Outcome: Progressing     Problem: Adult Inpatient Plan of Care  Goal: Absence of Hospital-Acquired Illness or Injury  Outcome: Progressing     Problem: Adult Inpatient Plan of Care  Goal: Optimal Comfort and Wellbeing  Outcome: Progressing     Problem: Adult Inpatient Plan of Care  Goal: Readiness for Transition of Care  Outcome: Progressing     Problem: Diabetes Comorbidity  Goal: Blood Glucose Level Within Targeted Range  Outcome: Progressing     Problem: Sepsis/Septic Shock  Goal: Optimal Coping  Outcome: Progressing     Problem: Sepsis/Septic Shock  Goal: Absence of Bleeding  Outcome: Progressing     Problem: Sepsis/Septic Shock  Goal: Blood Glucose Level Within Targeted Range  Outcome: Progressing

## 2025-05-30 NOTE — ASSESSMENT & PLAN NOTE
Doppler US of left leg without any evidence of DVT presently.    Anticoagulation held for tunneled line placement..  Bleeding from dialysis catheter resolved.  Restarted apixaban.

## 2025-05-31 LAB
ABSOLUTE EOSINOPHIL (OHS): 0 K/UL
ABSOLUTE MONOCYTE (OHS): 0.29 K/UL (ref 0.3–1)
ABSOLUTE NEUTROPHIL COUNT (OHS): 6.6 K/UL (ref 1.8–7.7)
ANION GAP (OHS): 12 MMOL/L (ref 8–16)
APTT PPP: 27.2 SECONDS (ref 21–32)
BACTERIA FLD AEROBE CULT: NO GROWTH
BASOPHILS # BLD AUTO: 0.01 K/UL
BASOPHILS NFR BLD AUTO: 0.1 %
BUN SERPL-MCNC: 63 MG/DL (ref 8–23)
CALCIUM SERPL-MCNC: 7.7 MG/DL (ref 8.7–10.5)
CHLORIDE SERPL-SCNC: 100 MMOL/L (ref 95–110)
CO2 SERPL-SCNC: 22 MMOL/L (ref 23–29)
CREAT SERPL-MCNC: 4 MG/DL (ref 0.5–1.4)
ERYTHROCYTE [DISTWIDTH] IN BLOOD BY AUTOMATED COUNT: 16.5 % (ref 11.5–14.5)
GFR SERPLBLD CREATININE-BSD FMLA CKD-EPI: 15 ML/MIN/1.73/M2
GLUCOSE SERPL-MCNC: 153 MG/DL (ref 70–110)
GRAM STN SPEC: NORMAL
GRAM STN SPEC: NORMAL
HCT VFR BLD AUTO: 26 % (ref 40–54)
HGB BLD-MCNC: 8.2 GM/DL (ref 14–18)
IMM GRANULOCYTES # BLD AUTO: 0.06 K/UL (ref 0–0.04)
IMM GRANULOCYTES NFR BLD AUTO: 0.8 % (ref 0–0.5)
LYMPHOCYTES # BLD AUTO: 0.32 K/UL (ref 1–4.8)
MAGNESIUM SERPL-MCNC: 1.8 MG/DL (ref 1.6–2.6)
MCH RBC QN AUTO: 30.5 PG (ref 27–31)
MCHC RBC AUTO-ENTMCNC: 31.5 G/DL (ref 32–36)
MCV RBC AUTO: 97 FL (ref 82–98)
NUCLEATED RBC (/100WBC) (OHS): 0 /100 WBC
PHOSPHATE SERPL-MCNC: 5.2 MG/DL (ref 2.7–4.5)
PLATELET # BLD AUTO: 140 K/UL (ref 150–450)
PMV BLD AUTO: 10.8 FL (ref 9.2–12.9)
POCT GLUCOSE: 142 MG/DL (ref 70–110)
POCT GLUCOSE: 156 MG/DL (ref 70–110)
POCT GLUCOSE: 166 MG/DL (ref 70–110)
POTASSIUM SERPL-SCNC: 5 MMOL/L (ref 3.5–5.1)
RBC # BLD AUTO: 2.69 M/UL (ref 4.6–6.2)
RELATIVE EOSINOPHIL (OHS): 0 %
RELATIVE LYMPHOCYTE (OHS): 4.4 % (ref 18–48)
RELATIVE MONOCYTE (OHS): 4 % (ref 4–15)
RELATIVE NEUTROPHIL (OHS): 90.7 % (ref 38–73)
SODIUM SERPL-SCNC: 134 MMOL/L (ref 136–145)
VANCOMYCIN SERPL-MCNC: 12.3 UG/ML (ref ?–80)
WBC # BLD AUTO: 7.28 K/UL (ref 3.9–12.7)

## 2025-05-31 PROCEDURE — 85730 THROMBOPLASTIN TIME PARTIAL: CPT | Performed by: HOSPITALIST

## 2025-05-31 PROCEDURE — 84100 ASSAY OF PHOSPHORUS: CPT | Performed by: HOSPITALIST

## 2025-05-31 PROCEDURE — 36415 COLL VENOUS BLD VENIPUNCTURE: CPT | Performed by: HOSPITALIST

## 2025-05-31 PROCEDURE — 21400001 HC TELEMETRY ROOM

## 2025-05-31 PROCEDURE — A4216 STERILE WATER/SALINE, 10 ML: HCPCS | Performed by: HOSPITALIST

## 2025-05-31 PROCEDURE — 25000003 PHARM REV CODE 250: Performed by: NURSE PRACTITIONER

## 2025-05-31 PROCEDURE — 63600175 PHARM REV CODE 636 W HCPCS: Mod: JZ,TB | Performed by: NURSE PRACTITIONER

## 2025-05-31 PROCEDURE — 80202 ASSAY OF VANCOMYCIN: CPT | Performed by: HOSPITALIST

## 2025-05-31 PROCEDURE — 25000003 PHARM REV CODE 250: Performed by: HOSPITALIST

## 2025-05-31 PROCEDURE — 80048 BASIC METABOLIC PNL TOTAL CA: CPT | Performed by: HOSPITALIST

## 2025-05-31 PROCEDURE — 63600175 PHARM REV CODE 636 W HCPCS: Performed by: HOSPITALIST

## 2025-05-31 PROCEDURE — 83735 ASSAY OF MAGNESIUM: CPT | Performed by: HOSPITALIST

## 2025-05-31 PROCEDURE — 85025 COMPLETE CBC W/AUTO DIFF WBC: CPT | Performed by: HOSPITALIST

## 2025-05-31 RX ORDER — SIMETHICONE 80 MG
1 TABLET,CHEWABLE ORAL 3 TIMES DAILY PRN
Status: DISCONTINUED | OUTPATIENT
Start: 2025-05-31 | End: 2025-06-04 | Stop reason: HOSPADM

## 2025-05-31 RX ADMIN — FERROUS SULFATE TAB 325 MG (65 MG ELEMENTAL FE) 1 EACH: 325 (65 FE) TAB at 08:05

## 2025-05-31 RX ADMIN — LEVOTHYROXINE SODIUM 25 MCG: 25 TABLET ORAL at 06:05

## 2025-05-31 RX ADMIN — METHYLPREDNISOLONE 8 MG: 4 TABLET ORAL at 10:05

## 2025-05-31 RX ADMIN — TAMSULOSIN HYDROCHLORIDE 0.4 MG: 0.4 CAPSULE ORAL at 08:05

## 2025-05-31 RX ADMIN — MIDODRINE HYDROCHLORIDE 5 MG: 5 TABLET ORAL at 10:05

## 2025-05-31 RX ADMIN — SEVELAMER CARBONATE 800 MG: 800 TABLET, FILM COATED ORAL at 07:05

## 2025-05-31 RX ADMIN — ASCORBIC ACID, THIAMINE MONONITRATE,RIBOFLAVIN, NIACINAMIDE, PYRIDOXINE HYDROCHLORIDE, FOLIC ACID, CYANOCOBALAMIN, BIOTIN, CALCIUM PANTOTHENATE, 1 CAPSULE: 100; 1.5; 1.7; 20; 10; 1; 6000; 150000; 5 CAPSULE, LIQUID FILLED ORAL at 08:05

## 2025-05-31 RX ADMIN — AMIODARONE HYDROCHLORIDE 200 MG: 200 TABLET ORAL at 08:05

## 2025-05-31 RX ADMIN — Medication 10 ML: at 06:05

## 2025-05-31 RX ADMIN — OXYCODONE HYDROCHLORIDE 5 MG: 5 TABLET ORAL at 05:05

## 2025-05-31 RX ADMIN — LIDOCAINE 1 PATCH: 50 PATCH CUTANEOUS at 08:05

## 2025-05-31 RX ADMIN — MIDODRINE HYDROCHLORIDE 5 MG: 5 TABLET ORAL at 08:05

## 2025-05-31 RX ADMIN — SIMETHICONE 80 MG: 80 TABLET, CHEWABLE ORAL at 10:05

## 2025-05-31 RX ADMIN — SEVELAMER CARBONATE 800 MG: 800 TABLET, FILM COATED ORAL at 05:05

## 2025-05-31 RX ADMIN — PRAVASTATIN SODIUM 40 MG: 20 TABLET ORAL at 08:05

## 2025-05-31 RX ADMIN — Medication 10 ML: at 01:05

## 2025-05-31 RX ADMIN — METOPROLOL SUCCINATE 12.5 MG: 25 TABLET, EXTENDED RELEASE ORAL at 08:05

## 2025-05-31 RX ADMIN — EPOETIN ALFA-EPBX 10000 UNITS: 10000 INJECTION, SOLUTION INTRAVENOUS; SUBCUTANEOUS at 08:05

## 2025-05-31 RX ADMIN — Medication 10 ML: at 10:05

## 2025-05-31 RX ADMIN — GABAPENTIN 200 MG: 100 CAPSULE ORAL at 10:05

## 2025-05-31 RX ADMIN — METHYLPREDNISOLONE 4 MG: 4 TABLET ORAL at 05:05

## 2025-05-31 RX ADMIN — PANTOPRAZOLE SODIUM 40 MG: 40 TABLET, DELAYED RELEASE ORAL at 08:05

## 2025-05-31 RX ADMIN — SEVELAMER CARBONATE 800 MG: 800 TABLET, FILM COATED ORAL at 12:05

## 2025-05-31 RX ADMIN — MUPIROCIN: 20 OINTMENT TOPICAL at 10:05

## 2025-05-31 RX ADMIN — METHYLPREDNISOLONE 4 MG: 4 TABLET ORAL at 07:05

## 2025-05-31 RX ADMIN — METHYLPREDNISOLONE 4 MG: 4 TABLET ORAL at 12:05

## 2025-05-31 RX ADMIN — INSULIN ASPART 2 UNITS: 100 INJECTION, SOLUTION INTRAVENOUS; SUBCUTANEOUS at 01:05

## 2025-05-31 RX ADMIN — MUPIROCIN: 20 OINTMENT TOPICAL at 08:05

## 2025-05-31 RX ADMIN — INSULIN ASPART 2 UNITS: 100 INJECTION, SOLUTION INTRAVENOUS; SUBCUTANEOUS at 08:05

## 2025-05-31 RX ADMIN — BRIMONIDINE TARTRATE 1 DROP: 1.5 SOLUTION/ DROPS OPHTHALMIC at 09:05

## 2025-05-31 RX ADMIN — BRIMONIDINE TARTRATE 1 DROP: 1.5 SOLUTION/ DROPS OPHTHALMIC at 08:05

## 2025-05-31 RX ADMIN — APIXABAN 2.5 MG: 2.5 TABLET, FILM COATED ORAL at 10:05

## 2025-05-31 RX ADMIN — APIXABAN 2.5 MG: 2.5 TABLET, FILM COATED ORAL at 08:05

## 2025-05-31 NOTE — PROGRESS NOTES
Pharmacokinetic Assessment Follow Up: IV Vancomycin    Vancomycin serum concentration assessment(s):    The random level was drawn correctly and can be used to guide therapy at this time. The measurement is within the desired definitive target range of 10 to 20 mcg/mL.    Vancomycin Regimen Plan:    Give IV Vanc 750 mg x 1 dose later today after HD is complete.   Re-dose when the random level is less than 25 mcg/mL, next level to be drawn at 0430 on 6/3 prior to iHD (TTS)  Pulse dosing post-HD based on pre-HD level drawn with AM labs on the morning of scheduled HD       Drug levels (last 3 results):  Recent Labs   Lab Result Units 05/29/25 0417 05/30/25  0507 05/31/25  0410   Vancomycin Random ug/ml 7.6 14.7 12.3       Pharmacy will continue to follow and monitor vancomycin.    Please contact pharmacy at extension [phone] for questions regarding this assessment.    Thank you for the consult,   Amanda Bliss       Patient brief summary:  Jarrett Charlton Jr. is a 75 y.o. male initiated on antimicrobial therapy with IV Vancomycin for treatment of bone/joint infection    The patient's current regimen is pulse dosing post-HD based on pre-HD level drawn with AM labs on the morning of scheduled HD       Drug Allergies:   Review of patient's allergies indicates:   Allergen Reactions    Atorvastatin     Rosuvastatin        Actual Body Weight:   78.9 kg    Renal Function:   Estimated Creatinine Clearance: 17.5 mL/min (A) (based on SCr of 4 mg/dL (H)).,     Dialysis Method (if applicable):  intermittent HD - TTS    CBC (last 72 hours):  Recent Labs   Lab Result Units 05/29/25 0417 05/30/25  0507 05/31/25  0410   WBC K/uL 8.82 7.47 7.28   HGB gm/dL 7.5* 8.0* 8.2*   HCT % 23.8* 26.3* 26.0*   Platelet Count K/uL 132* 135* 140*   Lymph % % 2.7* 2.3* 4.4*   Mono % % 2.3* 1.6* 4.0   Eos % % 0.0 0.0 0.0   Basophil % % 0.1 0.0 0.1       Metabolic Panel (last 72 hours):  Recent Labs   Lab Result Units 05/29/25 0417 05/30/25  0507  05/31/25  0410   Sodium mmol/L 134* 134* 134*   Potassium mmol/L 4.7 5.2* 5.0   Chloride mmol/L 99 98 100   CO2 mmol/L 22* 25 22*   Glucose mg/dL 211* 162* 153*   BUN mg/dL 52* 41* 63*   Creatinine mg/dL 4.0* 3.0* 4.0*   Magnesium  mg/dL 1.8 1.8 1.8   Phosphorus Level mg/dL 5.3* 4.1 5.2*       Vancomycin Administrations:  vancomycin given in the last 96 hours                     vancomycin (VANCOCIN) 1,000 mg in 0.9% NaCl 250 mL IVPB (admixture device) (mg) 1,000 mg New Bag 05/29/25 0632    vancomycin 1,500 mg in 0.9% NaCl 250 mL IVPB (admixture device) (mg) 1,500 mg New Bag 05/27/25 1826                    Microbiologic Results:  Microbiology Results (last 7 days)       Procedure Component Value Units Date/Time    Culture, Body Fluid (Aerobic) w/ GS [[id number]] Collected: 05/26/25 1908    Order Status: Completed Specimen: Joint Fluid, Right Knee Updated: 05/30/25 1139     CULTURE, FLUID No Growth To Date     GRAM STAIN Few WBC seen      No organisms seen    Fungus culture [[id number]]  (Normal) Collected: 05/26/25 1908    Order Status: Completed Specimen: Joint Fluid, Right Knee Updated: 05/29/25 1114     Fungal Culture Culture In Progress

## 2025-05-31 NOTE — ASSESSMENT & PLAN NOTE
-Noted history - ?pAfib? And plans for ablation at [facility] 7/15/25.  -Continue home amiodarone and metoprolol  Restarted apixaban

## 2025-05-31 NOTE — PROGRESS NOTES
"[address] Medicine  Progress Note    Patient Name: Jarrett Charlton Jr.  MRN: [id number]  Patient Class: IP- Inpatient   Admission Date: 5/26/2025  Length of Stay: 4 days  Attending Physician: Darrell Cheema MD  Primary Care Provider: Olga Hampton MD        Subjective     Principal Problem:Cervical spinal stenosis        HPI:  As per Poli Damon MD:    "Mr. Charlton is a 75 year old man with autonomic dysfunction on midodrine, coronary artery disease, progressing CKDV, BPH prostate cancer s/p TURP, BPH, cervical disk disease (s/p spinal fusion in the 80s), ulcerative colitis (s/p colectomy and illiostomy, ventricular tachycardia / ?atrial fibrillation s/p ablation who presented for evaluation of severe pain in his right shoulder / neck and left groin.  History is obtained by patient and his daughter at bedside.  They note a fall several months ago with abrasion to his lumbar back and shins.  He also had a fall 3 days ago which he states occurred when he was turning and tripped over his shoes.  He didn't hit his head, but did fall onto his right hand/arm.  He had no severe pain at that time.  He states that yesterday he woke up feeling ok, but as the day progressed he developed severe left inner groin pain.  The pain has been progressively worse throughout the day and is exacerbated by walking and laying down.  He also reports developing severe pain in his neck starting at the posterior base of his cranium and radiating down to his right shoulder.  The pain is severe and also worse with laying flat.  He describes the neck/shoulder pain as throbbing and intense.  He reports baseline dyspnea on exertion which is stable.  He denies fevers, nausea, vomiting, diarrhea, diminished urine output, cough, increased leg swelling, diminished oral intake, palpitations, chest pain and abdominal pain.  In the ED he was treated with methocarbamol, morphine and oxycodone with moderate relief " "of his pain.      Of note, patient recently treated at [facility] for campylobacter gastroenteritis and enterobacter bacteremia (discharged 4/28).  He was also treated at [facility] for worsening renal failure and hyperkalemia and discharged 5/18 with referral to vascular surgery for AV graft placement.  His daughter reports it is planned to be placed in July.  Daughter also reports planned ERCP/EUS for pancreatic lesion in July and cardiac ablation planned for July 15th."    Overview/Hospital Course:  Patient is 75-year-old man with a non dysfunction on midodrine, coronary artery disease, history of arrhythmias including atrial fibrillation and ventricular tachycardia,  chronic kidney disease stage 5, ulcerative colitis status post remote colectomy with ileostomy, cervical spinal disease status post remote spinal fusion who presents with severe pain in the right shoulder and left groin along with evidence of worsening renal function.    Nephrology consult recommended initiating hemodialysis.  A tunneled dialysis catheter placed today.    Recent imaging showed evidence of small acute left frontal stroke likely unrelated to current presentation.  Evaluated by neurology recommended continuing anticoagulation.    Dr. Adan Santiago (Spine Orthopedic Surgery) evaluated patient and recommended high-dose steroids to treat spinal stenosis along with therapy.    Patient also underwent an arthrocentesis of his right knee.  Patient with 50,000 white cells with neutrophil predominance.  Gram stain negative.    Cultures submitted.    Interval History: Right knee improved.  Back/neck pain doing well.    Review of Systems   Constitutional:  Negative for chills and fever.   Respiratory:  Negative for shortness of breath.    Cardiovascular:  Negative for chest pain.   Gastrointestinal:  Negative for abdominal pain, constipation, diarrhea, nausea and vomiting.   Musculoskeletal:  Positive for arthralgias and back pain.     Objective: "     Vital Signs (Most Recent):  Temp: 97.9 °F (36.6 °C) (05/31/25 0726)  Pulse: (!) 51 (05/31/25 1054)  Resp: 17 (05/31/25 0726)  BP: (!) 151/72 (05/31/25 0726)  SpO2: 99 % (05/31/25 0726) Vital Signs (24h Range):  Temp:  [97.8 °F (36.6 °C)-98.7 °F (37.1 °C)] 97.9 °F (36.6 °C)  Pulse:  [51-69] 51  Resp:  [17-18] 17  SpO2:  [97 %-100 %] 99 %  BP: (118-166)/(60-75) 151/72     Weight: 78.9 kg (173 lb 15.1 oz)  Body mass index is 22.33 kg/m².    Intake/Output Summary (Last 24 hours) at 5/31/2025 1108  Last data filed at 5/31/2025 0549  Gross per 24 hour   Intake 470 ml   Output 1400 ml   Net -930 ml         Physical Exam  Constitutional:       Appearance: He is ill-appearing.   Cardiovascular:      Rate and Rhythm: Normal rate and regular rhythm.      Heart sounds: Normal heart sounds. No murmur heard.     No friction rub. No gallop.      Comments: Tunneled hemodialysis catheter to right chest wall, without bleeding.  Pulmonary:      Effort: Pulmonary effort is normal. No respiratory distress.      Breath sounds: Normal breath sounds. No wheezing or rales.   Abdominal:      General: Bowel sounds are normal. There is no distension.      Palpations: Abdomen is soft.      Tenderness: There is no abdominal tenderness.      Comments:  Colostomy with stool in bag.   Musculoskeletal:         General: Tenderness present. Normal range of motion.      Comments: Right knee improved   Skin:     General: Skin is warm and dry.      Coloration: Skin is not pale.      Findings: No erythema or rash.   Neurological:      Mental Status: He is alert and oriented to person, place, and time.               Significant Labs: All pertinent labs within the past 24 hours have been reviewed.    Significant Imaging: I have reviewed all pertinent imaging results/findings within the past 24 hours.      Assessment & Plan  Cervical spinal stenosis  Neck pain on right side  Effusion of right knee  Patient presents with significant worsening debility  from baseline. MRI C-spine shows severe degenerative changes producing severe foraminal and central canal stenosis from C2 through C5 and severe left foraminal stenosis at C5-6.  MRI soft tissue neck shows no soft tissue mass or lymphadenopathy throughout the soft tissues of the neck.     Dr. Adan Santiago (Spine Orthopedic Surgery) evaluated patient and recommended high-dose steroids to treat spinal stenosis along with therapy.    Patient also underwent an arthrocentesis of his right knee.  Patient with 50,000 white cells with neutrophil predominance.  Gram stain negative.   Dr. Merrill suspect infusion likely inflammatory and not likely a septic joint.   Cultures submitted and started on empiric antibiotic therapy pending culture results.  Culture remain negative.  Knee improving with anti-inflammatory medications.  Improved range of motion.  Discontinue further empiric antibiotics.  We will need close outpatient follow up.  History of deep venous thrombosis (DVT) of distal vein of left lower extremity  Doppler US of left leg without any evidence of DVT presently.    Anticoagulation held for tunneled line placement..  Bleeding from dialysis catheter resolved.  Restarted apixaban.    Falls  Debility  Autonomic dysfunction  Acute ischemic stroke  Orthostatic hypotension  -Noted at least 2 falls recently and saw Dr. Patel with neurology a few days ago.  His note was reviewed, noted concern for possible stroke and I ordered his suggested imaging workup.    -Suspect falls and debility are multifactorial.  -MRI brain notable for punctate focus diffusion restriction about the left frontal subcortical white matter, in keeping with a small acute infarct. No significant surrounding edema or mass effect. No evidence for acute intracranial hemorrhage.   -MRA brain was normal  Echocardiogram did not show thrombolytic source.  Evaluated by neurology recommended continuing anticoagulation.    Anticoagulation held for   hemodialysis line placement.  Restarted on apixaban.  Acute renal failure with acute tubular necrosis superimposed on stage 5 chronic kidney disease, not on chronic dialysis  Dialysis line placement and HD initiation this hospitalization.  Patient underwent dialysis yesterday.  Metabolic derangements improved.   Acute on chronic combined systolic and diastolic congestive heart failure  Essential hypertension  CAD (coronary artery disease)  -On admit he denies any sob despite elevated BNP and peripheral edema.  Denies chest pain.  Troponin negative.  -Echo 2/25/25 showed EF 30%, indeterminate diastolic function, mild LA dilation, mild/moderate MR  -Strict ins/outs and daily weights with low sodium fluid restricted diet  -Continue statin, midodrine and metoprolol.  Restarted apixaban.  Atrial paroxysmal tachycardia  -Noted history - ?pAfib? And plans for ablation at [facility] 7/15/25.  -Continue home amiodarone and metoprolol  Restarted apixaban  Thrombocytopenia  Mild, stable.  T2DM (type 2 diabetes mellitus)  -A1c 5.7  -Home regimen includes glimepiride - holding for now  -Sugars reasonably controlled  -Will treat with SSI ac/hs for now  History of ulcerative colitis  High output ileostomy  -Noted history  -Ostomy functioning as it should per patient.  Moderate protein-calorie malnutrition  -Nutrition consulted. Most recent weight and BMI monitored-   -Measurements:  Wt Readings from Last 1 Encounters:   05/29/25 78.9 kg (173 lb 15.1 oz)   Body mass index is 22.33 kg/m².  Gout  -Noted history  -Hold prior colchicine.    Hypothyroidism  -Continue home levothyroxine  Type 2 diabetes mellitus  Reasonably controlled with current regimen.  Will continue with current regimen and continue to monitor.  ACP (advance care planning)  -Advance Care Planning Date: 05/26/2025 I engaged patient and his daughter, at bedside, in discussion surrounding his goals of care at the end of life.  We discussed the pros and cons of aggressive  treatment in the face of cardiac arrest.  He wishes to continue with aggressive medical care, including dialysis and multiple upcoming procedures.  However, if he were to suffer a cardiac arrest he wishes to be allowed a peaceful death.  I explained that this is consistent with DNR and he was in agreement.  DNR order placed.  I spent 10 minutes ACP time today.  VTE Risk Mitigation (From admission, onward)           Ordered     apixaban tablet 2.5 mg  2 times daily         05/29/25 0930     heparin (porcine) injection 4,000 Units  As needed (PRN)         05/28/25 1306     Place sequential compression device  Until discontinued         05/26/25 0808     IP VTE HIGH RISK PATIENT  Once         05/26/25 0808     Place sequential compression device  Until discontinued         05/26/25 0546                    Darrell Cheema MD  Department of Hospital Medicine   [facility] Med Surg ([address])

## 2025-05-31 NOTE — PROGRESS NOTES
"Nephrology  Progress Note    Admit Date: 5/26/2025   LOS: 4 days     SUBJECTIVE:     Follow-up For:  GRADY    History of Present Illness:  Patient is a 75 y.o. male presents with neck/shoulder pain.  W/up in process and preliminarily MRI with acute left frontal infarct.  Labs significant for worsening GRADY/CKD (Creat 7.8, BUN 94, BNP 1253).  Pt with extensive med hx as outlined below and known to me from recent admission.  Significant renal disease and followed by [facility].  Has had rapid progression of CKD over past year with creat steadily rising 3-4-5-6-7.  Has been referred to vascular for AVF and transplant.  Seen after multiple testings.  Looks older than stated age,  frail, etc.  C/o pain in neck, head.       EPIC reviewed.  Case discussed with team.          Interval History:     No events overnight.  Still awaiting DC plans.     Review of Systems:  Constitutional: No fever or chills  Respiratory: No cough or shortness of breath  Cardiovascular: No chest pain or palpitations  Gastrointestinal: No nausea or vomiting  Neurological: No confusion or weakness    OBJECTIVE:     Vital Signs Range (Last 24H):  BP (!) 151/72 (BP Location: Right arm, Patient Position: Lying)   Pulse (!) 54   Temp 97.9 °F (36.6 °C) (Oral)   Resp 17   Ht 6' 2" (1.88 m)   Wt 78.9 kg (173 lb 15.1 oz)   SpO2 99%   BMI 22.33 kg/m²     Temp:  [97.8 °F (36.6 °C)-98.7 °F (37.1 °C)]   Pulse:  [51-69]   Resp:  [17-18]   BP: (118-166)/(60-75)   SpO2:  [97 %-100 %]     I & O (Last 24H):  Intake/Output Summary (Last 24 hours) at 5/31/2025 0920  Last data filed at 5/31/2025 0549  Gross per 24 hour   Intake 470 ml   Output 1400 ml   Net -930 ml       Physical Exam:  General appearance:  NAD this am.  Eyes:  Conjunctivae/corneas clear. PERRL.  Lungs: Normal respiratory effort,   clear to auscultation bilaterally   Heart: Regular rate and rhythm, S1, S2 normal, 3/6 murmur  Abdomen: Soft, nontender, ostomy + bowel sounds  Extremities: No " cyanosis or clubbing.  trace+ edema.    Right knee painful.   Skin: Skin color pale, texture, turgor normal. No rashes or lesions  Neurological:  nonfocal  RIJ ROBINSON.       Laboratory Data:  Recent Labs   Lab 05/31/25  0410   WBC 7.28   RBC 2.69*   HGB 8.2*   HCT 26.0*   *   MCV 97   MCH 30.5   MCHC 31.5*       BMP:   Recent Labs   Lab 05/31/25  0410   *   *   K 5.0      CO2 22*   BUN 63*   CREATININE 4.0*   CALCIUM 7.7*   MG 1.8   PHOS 5.2*     Lab Results   Component Value Date    CALCIUM 7.7 (L) 05/31/2025    PHOS 5.2 (H) 05/31/2025       Lab Results   Component Value Date    .4 (H) 05/16/2025    CALCIUM 7.7 (L) 05/31/2025    PHOS 5.2 (H) 05/31/2025       Lab Results   Component Value Date    URICACID 7.9 (H) 10/10/2024       BNP  Recent Labs   Lab 05/26/25  0350   BNP 1,253*       Medications:  Medication list was reviewed and changes noted under Assessment/Plan.    Diagnostic Results:    X-Ray Knee 1 or 2 View Right   Final Result      Moderate suprapatellar joint effusion with no acute osseous abnormality seen.      Osteoarthritis with chondrocalcinosis.         Electronically signed by: Daysi Richard   Date:    05/30/2025   Time:    10:37      IR Tunneled Catheter Insert w/o Port   Final Result      Insertion of right -sided supradiaphragmatic dual- lumen tunneled hemodialysis catheter, with tip in the expected location of the cavoatrial junction.      Plan:      The catheter may be used immediately.      _______________________________________________________________         Electronically signed by: Jamie Stokes MD   Date:    05/27/2025   Time:    13:24      X-Ray Cervical Spine 5 View W Flex Extxt   Final Result      Chronic change as above.         Electronically signed by: Kurt Bustillo MD   Date:    05/27/2025   Time:    07:46      X-Ray Hip 2 or 3 views Left with Pelvis when performed   Final Result      Please see above.         Electronically signed by: Derrek Vega    Date:    05/26/2025   Time:    14:01      US Carotid Bilateral   Final Result      No evidence of a hemodynamically significant carotid bifurcation stenosis.         Electronically signed by: Derrek Vega   Date:    05/26/2025   Time:    11:23      MRI Soft Tissue Neck Without Contrast   Final Result      No soft tissue mass or lymphadenopathy throughout the soft tissues of the neck.         Electronically signed by: Derrek Vega   Date:    05/26/2025   Time:    13:11      MRI Cervical Spine Without Contrast   Final Result      Severe degenerative changes as described producing severe foraminal and central canal stenosis from C2 through C5 and severe left foraminal stenosis at C5-6.         Electronically signed by: Jonathan Hill Jr   Date:    05/26/2025   Time:    12:16      MRA Brain without contrast   Final Result      MR angiogram of the head appears within normal limits.         Electronically signed by: Derrek Vega   Date:    05/26/2025   Time:    10:27      MRI Brain Without Contrast   Final Result   Abnormal      Punctate focus diffusion restriction about the left frontal subcortical white matter, in keeping with a small acute infarct.  No significant surrounding edema or mass effect.  No evidence for acute intracranial hemorrhage.      Sequela of microvascular ischemic change.      This report was flagged in Epic as abnormal.         Electronically signed by: Derrek Vega   Date:    05/26/2025   Time:    10:16      US Lower Extremity Veins Left   Final Result      No evidence of deep venous thrombosis in the left lower extremity.         Electronically signed by: Derrek Vega   Date:    05/26/2025   Time:    08:23      X-Ray Chest AP Portable   Final Result      Bibasilar atelectasis with chronic prominence of the pulmonary vascular markings.         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  Now has transitioned to ESRD:  -followed closely by [facility]  -recently DC'd with Creat 6.3/BUN 79  -referred to vascular and transplant.   -hold diuretics  -Creat 7.8/BUN 90  -was on eliquis but held with planned ROBINSON.  Hep drip off  -bicarb tabs for worsening AGMA (CKD/GI losses) but stop with HD  -5/27:  Ext discussion with pt about RRT options vs Palliative.  Wants to do HD in [place].  Salvaging left arm for AVF/AVG next month.  Consulted Dr. Middleton for ROBINSON today but IR had to place.  Start RRT, labs, and consult SW to start HD as outpt.   -5/28:  continue with gentle HD X 3 treatments and then transition to 3/week.  Monitored ROBINSON site as may need stitch but doing well  -5/29:  HD #3 today and then TTS.  Await outpt HD arrangements in [place].    -5/30:  transitioned to TTS.    5/31: Seen on HD. Awaiting outpt HD placement   -see orders.  -Renally dose meds, avoid nephrotoxins, and monitor I/O's closely.      Acute CVA:  -MRI noted.  -defer to neurology.      Spinal Stenosis:  -on decadron.   -defer     Multifactorial anemia:  -RODRIGUE cleared by neurology  -recent IV iron  -On Epo IV 10k TTS  -transfuse if needed.    MBD:  -nephrocaps/binders  -PTH/ Phos at goal for CKD     Hypotension> HTN:  -Change midodrine to prn with dialysis     LLE DVT:  -on eliquis but held with bleeding.   -resumed      PAF and severe PA-HTN:  -defer     UC w/ ileostomy:  -not a candidate for PD     Right knee pain:  -s/p drain by Ortho  -not gout  -defer.      DISPO:     Multiple comorbidities  DNR        Thanks for consult  See above  Will follow along.        High MDM complexity necessary to treat or prevent imminent or life-threatening deterioration of the following conditions: ESRD  Time spent personally by me on the following activities 50 min: development of treatment plan with patient or  surrogate, discussions with consultants, interpretation of cardiac output measurements, examination of patient, ordering and performing treatments and interventions, evaluation of patient's response to treatment, obtaining history from patient or surrogate, ordering and review of laboratory studies, ordering and review of radiographic studies, re-evaluation of patient's condition, pulse oximetry and blood draw for specimens

## 2025-05-31 NOTE — PLAN OF CARE
Pt lying in bed, assesment complete no pain or distress noted. Call line is within reach and the bed has been lowered.  Problem: Adult Inpatient Plan of Care  Goal: Plan of Care Review  Outcome: Progressing  Goal: Patient-Specific Goal (Individualized)  Outcome: Progressing  Goal: Absence of Hospital-Acquired Illness or Injury  Outcome: Progressing  Goal: Optimal Comfort and Wellbeing  Outcome: Progressing  Goal: Readiness for Transition of Care  Outcome: Progressing     Problem: Diabetes Comorbidity  Goal: Blood Glucose Level Within Targeted Range  Outcome: Progressing     Problem: Sepsis/Septic Shock  Goal: Optimal Coping  Outcome: Progressing  Goal: Absence of Bleeding  Outcome: Progressing  Goal: Blood Glucose Level Within Targeted Range  Outcome: Progressing  Goal: Absence of Infection Signs and Symptoms  Outcome: Progressing  Goal: Optimal Nutrition Intake  Outcome: Progressing     Problem: Wound  Goal: Optimal Coping  Outcome: Progressing  Goal: Optimal Functional Ability  Outcome: Progressing  Goal: Absence of Infection Signs and Symptoms  Outcome: Progressing  Goal: Improved Oral Intake  Outcome: Progressing  Goal: Optimal Pain Control and Function  Outcome: Progressing  Goal: Skin Health and Integrity  Outcome: Progressing  Goal: Optimal Wound Healing  Outcome: Progressing     Problem: Fall Injury Risk  Goal: Absence of Fall and Fall-Related Injury  Outcome: Progressing     Problem: Skin Injury Risk Increased  Goal: Skin Health and Integrity  Outcome: Progressing     Problem: Coping Ineffective  Goal: Effective Coping  Outcome: Progressing     Problem: Hemodialysis  Goal: Safe, Effective Therapy Delivery  Outcome: Progressing  Goal: Effective Tissue Perfusion  Outcome: Progressing  Goal: Absence of Infection Signs and Symptoms  Outcome: Progressing     Problem: Infection  Goal: Absence of Infection Signs and Symptoms  Outcome: Progressing     Problem: Pain Acute  Goal: Optimal Pain Control and  Function  Outcome: Progressing

## 2025-05-31 NOTE — SUBJECTIVE & OBJECTIVE
Interval History: Right knee improved.  Back/neck pain doing well.    Review of Systems   Constitutional:  Negative for chills and fever.   Respiratory:  Negative for shortness of breath.    Cardiovascular:  Negative for chest pain.   Gastrointestinal:  Negative for abdominal pain, constipation, diarrhea, nausea and vomiting.   Musculoskeletal:  Positive for arthralgias and back pain.     Objective:     Vital Signs (Most Recent):  Temp: 97.9 °F (36.6 °C) (05/31/25 0726)  Pulse: (!) 51 (05/31/25 1054)  Resp: 17 (05/31/25 0726)  BP: (!) 151/72 (05/31/25 0726)  SpO2: 99 % (05/31/25 0726) Vital Signs (24h Range):  Temp:  [97.8 °F (36.6 °C)-98.7 °F (37.1 °C)] 97.9 °F (36.6 °C)  Pulse:  [51-69] 51  Resp:  [17-18] 17  SpO2:  [97 %-100 %] 99 %  BP: (118-166)/(60-75) 151/72     Weight: 78.9 kg (173 lb 15.1 oz)  Body mass index is 22.33 kg/m².    Intake/Output Summary (Last 24 hours) at 5/31/2025 1108  Last data filed at 5/31/2025 0549  Gross per 24 hour   Intake 470 ml   Output 1400 ml   Net -930 ml         Physical Exam  Constitutional:       Appearance: He is ill-appearing.   Cardiovascular:      Rate and Rhythm: Normal rate and regular rhythm.      Heart sounds: Normal heart sounds. No murmur heard.     No friction rub. No gallop.      Comments: Tunneled hemodialysis catheter to right chest wall, without bleeding.  Pulmonary:      Effort: Pulmonary effort is normal. No respiratory distress.      Breath sounds: Normal breath sounds. No wheezing or rales.   Abdominal:      General: Bowel sounds are normal. There is no distension.      Palpations: Abdomen is soft.      Tenderness: There is no abdominal tenderness.      Comments:  Colostomy with stool in bag.   Musculoskeletal:         General: Tenderness present. Normal range of motion.      Comments: Right knee improved   Skin:     General: Skin is warm and dry.      Coloration: Skin is not pale.      Findings: No erythema or rash.   Neurological:      Mental Status: He is  alert and oriented to person, place, and time.               Significant Labs: All pertinent labs within the past 24 hours have been reviewed.    Significant Imaging: I have reviewed all pertinent imaging results/findings within the past 24 hours.

## 2025-05-31 NOTE — ASSESSMENT & PLAN NOTE
-Noted at least 2 falls recently and saw Dr. Patel with neurology a few days ago.  His note was reviewed, noted concern for possible stroke and I ordered his suggested imaging workup.    -Suspect falls and debility are multifactorial.  -MRI brain notable for punctate focus diffusion restriction about the left frontal subcortical white matter, in keeping with a small acute infarct. No significant surrounding edema or mass effect. No evidence for acute intracranial hemorrhage.   -MRA brain was normal  Echocardiogram did not show thrombolytic source.  Evaluated by neurology recommended continuing anticoagulation.    Anticoagulation held for  hemodialysis line placement.  Restarted on apixaban.   yes

## 2025-05-31 NOTE — ASSESSMENT & PLAN NOTE
Patient presents with significant worsening debility from baseline. MRI C-spine shows severe degenerative changes producing severe foraminal and central canal stenosis from C2 through C5 and severe left foraminal stenosis at C5-6.  MRI soft tissue neck shows no soft tissue mass or lymphadenopathy throughout the soft tissues of the neck.     Dr. Adan Santiago (Spine Orthopedic Surgery) evaluated patient and recommended high-dose steroids to treat spinal stenosis along with therapy.    Patient also underwent an arthrocentesis of his right knee.  Patient with 50,000 white cells with neutrophil predominance.  Gram stain negative.   Dr. Merrill suspect infusion likely inflammatory and not likely a septic joint.   Cultures submitted and started on empiric antibiotic therapy pending culture results.  Culture remain negative.  Knee improving with anti-inflammatory medications.  Improved range of motion.  Discontinue further empiric antibiotics.  We will need close outpatient follow up.

## 2025-05-31 NOTE — PROGRESS NOTES
05/31/25 1630   Post-Hemodialysis Assessment   Rinseback Volume (mL) 250 mL   Blood Volume Processed (Liters) 53.1 L   Dialyzer Clearance Clear   Duration of Treatment 180 minutes   Additional Fluid Intake (mL) 500 mL   Total UF (mL) 1500 mL   Net Fluid Removal 1000   Patient Response to Treatment tolerated well   Post-Hemodialysis Comments HD completed, no complications blood returtned CVC flushed and capped using aseptic technique, see flowsheet for information.

## 2025-05-31 NOTE — PLAN OF CARE
Pt lying in bed, assesment complete no pain or distress noted. Call line is within reach and the bed has been lowered.  Problem: Adult Inpatient Plan of Care  Goal: Plan of Care Review  5/31/2025 0444 by Heri Blancas RN  Outcome: Progressing  5/31/2025 0444 by Heri Blancas RN  Outcome: Progressing  5/30/2025 2341 by Heri Blancas RN  Outcome: Progressing  Goal: Patient-Specific Goal (Individualized)  5/31/2025 0444 by Heri Blancas RN  Outcome: Progressing  5/31/2025 0444 by Heri Blancas RN  Outcome: Progressing  5/30/2025 2341 by Heri Blancas RN  Outcome: Progressing  Goal: Absence of Hospital-Acquired Illness or Injury  5/31/2025 0444 by Heri Blancas RN  Outcome: Progressing  5/31/2025 0444 by Heri Blancas RN  Outcome: Progressing  5/30/2025 2341 by Heri Blancas RN  Outcome: Progressing  Goal: Optimal Comfort and Wellbeing  5/31/2025 0444 by Heri Blancas RN  Outcome: Progressing  5/31/2025 0444 by Heri Blancas RN  Outcome: Progressing  5/30/2025 2341 by Heri Blancas RN  Outcome: Progressing  Goal: Readiness for Transition of Care  5/31/2025 0444 by Heri Blancas RN  Outcome: Progressing  5/31/2025 0444 by Heri Blancas RN  Outcome: Progressing  5/30/2025 2341 by Heri Blancas RN  Outcome: Progressing     Problem: Diabetes Comorbidity  Goal: Blood Glucose Level Within Targeted Range  5/31/2025 0444 by Heri Blancas RN  Outcome: Progressing  5/31/2025 0444 by Heri Blancas RN  Outcome: Progressing  5/30/2025 2341 by Heri Blancas RN  Outcome: Progressing     Problem: Sepsis/Septic Shock  Goal: Optimal Coping  5/31/2025 0444 by Heri Blancas RN  Outcome: Progressing  5/31/2025 0444 by Heri Blancas RN  Outcome: Progressing  5/30/2025 2341 by Heri Blancas RN  Outcome: Progressing  Goal: Absence of Bleeding  5/31/2025 0444 by Heri Blancas RN  Outcome: Progressing  5/31/2025  0444 by Heri Blancas RN  Outcome: Progressing  5/30/2025 2341 by Heri Blancas RN  Outcome: Progressing  Goal: Blood Glucose Level Within Targeted Range  5/31/2025 0444 by Heri Blancas RN  Outcome: Progressing  5/31/2025 0444 by Heri Blancas RN  Outcome: Progressing  5/30/2025 2341 by Heri Blancas RN  Outcome: Progressing  Goal: Absence of Infection Signs and Symptoms  5/31/2025 0444 by Heri Blancas RN  Outcome: Progressing  5/31/2025 0444 by Heri Blancas RN  Outcome: Progressing  5/30/2025 2341 by Heri Blancas RN  Outcome: Progressing  Goal: Optimal Nutrition Intake  5/31/2025 0444 by Heri Blancas RN  Outcome: Progressing  5/31/2025 0444 by Heri Blancas RN  Outcome: Progressing  5/30/2025 2341 by Heri Blancas RN  Outcome: Progressing     Problem: Wound  Goal: Optimal Coping  5/31/2025 0444 by Heri Blancas RN  Outcome: Progressing  5/31/2025 0444 by Heri Blancas RN  Outcome: Progressing  5/30/2025 2341 by Heri Blancas RN  Outcome: Progressing  Goal: Optimal Functional Ability  5/31/2025 0444 by Heri Blancas RN  Outcome: Progressing  5/31/2025 0444 by Heri Blancas RN  Outcome: Progressing  5/30/2025 2341 by Heri Blancas RN  Outcome: Progressing  Goal: Absence of Infection Signs and Symptoms  5/31/2025 0444 by Heri Blancas RN  Outcome: Progressing  5/31/2025 0444 by Heri Blancas RN  Outcome: Progressing  5/30/2025 2341 by Heri Blancas RN  Outcome: Progressing  Goal: Improved Oral Intake  5/31/2025 0444 by Heri Blancas RN  Outcome: Progressing  5/31/2025 0444 by Heri Blancas RN  Outcome: Progressing  5/30/2025 2341 by Heri Blancas RN  Outcome: Progressing  Goal: Optimal Pain Control and Function  5/31/2025 0444 by Heri Blancas RN  Outcome: Progressing  5/31/2025 0444 by Heri Blancas RN  Outcome: Progressing  5/30/2025 2341 by Heri Blancas,  RN  Outcome: Progressing  Goal: Skin Health and Integrity  5/31/2025 0444 by Heri Blancas RN  Outcome: Progressing  5/31/2025 0444 by Heri Blancas RN  Outcome: Progressing  5/30/2025 2341 by Heri Blancas RN  Outcome: Progressing  Goal: Optimal Wound Healing  5/31/2025 0444 by Heri Blancas RN  Outcome: Progressing  5/31/2025 0444 by Heri Blancas RN  Outcome: Progressing  5/30/2025 2341 by Heri Blancas RN  Outcome: Progressing     Problem: Fall Injury Risk  Goal: Absence of Fall and Fall-Related Injury  5/31/2025 0444 by Heri Blancas RN  Outcome: Progressing  5/31/2025 0444 by Heri Blancas RN  Outcome: Progressing  5/30/2025 2341 by Heri Blancas RN  Outcome: Progressing     Problem: Coping Ineffective  Goal: Effective Coping  5/31/2025 0444 by Heri Blancas RN  Outcome: Progressing  5/31/2025 0444 by Heri Blancas RN  Outcome: Progressing  5/30/2025 2341 by Heri Blancas RN  Outcome: Progressing     Problem: Hemodialysis  Goal: Safe, Effective Therapy Delivery  5/31/2025 0444 by Heri Blancas RN  Outcome: Progressing  5/31/2025 0444 by Heri Blancas RN  Outcome: Progressing  5/30/2025 2341 by Heri Blancas RN  Outcome: Progressing  Goal: Effective Tissue Perfusion  5/31/2025 0444 by Heri Blancas RN  Outcome: Progressing  5/31/2025 0444 by Heri Blancas RN  Outcome: Progressing  5/30/2025 2341 by Heri Blancas RN  Outcome: Progressing  Goal: Absence of Infection Signs and Symptoms  5/31/2025 0444 by Heri Blancas RN  Outcome: Progressing  5/31/2025 0444 by Heri Blancas RN  Outcome: Progressing  5/30/2025 2341 by Heri Blancas RN  Outcome: Progressing     Problem: Infection  Goal: Absence of Infection Signs and Symptoms  5/31/2025 0444 by Heri Blancas RN  Outcome: Progressing  5/31/2025 0444 by Heri Blancas RN  Outcome: Progressing  5/30/2025 2341 by Heri Blancas  RN  Outcome: Progressing     Problem: Pain Acute  Goal: Optimal Pain Control and Function  5/31/2025 0444 by Heri Blancas RN  Outcome: Progressing  5/31/2025 0444 by Heri Blancas RN  Outcome: Progressing  5/30/2025 2341 by Heri Blancas RN  Outcome: Progressing

## 2025-05-31 NOTE — ASSESSMENT & PLAN NOTE
Patient presents with significant worsening debility from baseline. MRI C-spine shows severe degenerative changes producing severe foraminal and central canal stenosis from C2 through C5 and severe left foraminal stenosis at C5-6.  MRI soft tissue neck shows no soft tissue mass or lymphadenopathy throughout the soft tissues of the neck.     Dr. Adan Santiago (Spine Orthopedic Surgery) evaluated patient and recommended high-dose steroids to treat spinal stenosis along with therapy.    Patient also underwent an arthrocentesis of his right knee.  Patient with 50,000 white cells with neutrophil predominance.  Gram stain negative.   Dr. Merrill suspect infusion likely inflammatory and not likely a septic joint.   Cultures submitted and started on empiric antibiotic therapy pending culture results.  Culture remain negative.  Knee improving with anti-inflammatory medications.  Improved range of motion.  Discontinue further empiric antibiotics.  We will need close outpatient follow up.   Yes...

## 2025-06-01 LAB
ABSOLUTE EOSINOPHIL (OHS): 0 K/UL
ABSOLUTE MONOCYTE (OHS): 0.28 K/UL (ref 0.3–1)
ABSOLUTE NEUTROPHIL COUNT (OHS): 7.04 K/UL (ref 1.8–7.7)
ANION GAP (OHS): 9 MMOL/L (ref 8–16)
APTT PPP: 25.1 SECONDS (ref 21–32)
BASOPHILS # BLD AUTO: 0.01 K/UL
BASOPHILS NFR BLD AUTO: 0.1 %
BUN SERPL-MCNC: 39 MG/DL (ref 8–23)
CALCIUM SERPL-MCNC: 7.6 MG/DL (ref 8.7–10.5)
CHLORIDE SERPL-SCNC: 101 MMOL/L (ref 95–110)
CO2 SERPL-SCNC: 27 MMOL/L (ref 23–29)
CREAT SERPL-MCNC: 3 MG/DL (ref 0.5–1.4)
ERYTHROCYTE [DISTWIDTH] IN BLOOD BY AUTOMATED COUNT: 16.8 % (ref 11.5–14.5)
GFR SERPLBLD CREATININE-BSD FMLA CKD-EPI: 21 ML/MIN/1.73/M2
GLUCOSE SERPL-MCNC: 174 MG/DL (ref 70–110)
HCT VFR BLD AUTO: 26.8 % (ref 40–54)
HGB BLD-MCNC: 8.5 GM/DL (ref 14–18)
IMM GRANULOCYTES # BLD AUTO: 0.05 K/UL (ref 0–0.04)
IMM GRANULOCYTES NFR BLD AUTO: 0.7 % (ref 0–0.5)
LYMPHOCYTES # BLD AUTO: 0.27 K/UL (ref 1–4.8)
MAGNESIUM SERPL-MCNC: 1.7 MG/DL (ref 1.6–2.6)
MCH RBC QN AUTO: 30.7 PG (ref 27–31)
MCHC RBC AUTO-ENTMCNC: 31.7 G/DL (ref 32–36)
MCV RBC AUTO: 97 FL (ref 82–98)
NUCLEATED RBC (/100WBC) (OHS): 0 /100 WBC
PHOSPHATE SERPL-MCNC: 4.1 MG/DL (ref 2.7–4.5)
PLATELET # BLD AUTO: 130 K/UL (ref 150–450)
PMV BLD AUTO: 9.4 FL (ref 9.2–12.9)
POCT GLUCOSE: 136 MG/DL (ref 70–110)
POCT GLUCOSE: 159 MG/DL (ref 70–110)
POCT GLUCOSE: 208 MG/DL (ref 70–110)
POCT GLUCOSE: 280 MG/DL (ref 70–110)
POTASSIUM SERPL-SCNC: 4.5 MMOL/L (ref 3.5–5.1)
RBC # BLD AUTO: 2.77 M/UL (ref 4.6–6.2)
RELATIVE EOSINOPHIL (OHS): 0 %
RELATIVE LYMPHOCYTE (OHS): 3.5 % (ref 18–48)
RELATIVE MONOCYTE (OHS): 3.7 % (ref 4–15)
RELATIVE NEUTROPHIL (OHS): 92 % (ref 38–73)
SODIUM SERPL-SCNC: 137 MMOL/L (ref 136–145)
WBC # BLD AUTO: 7.65 K/UL (ref 3.9–12.7)

## 2025-06-01 PROCEDURE — 25000003 PHARM REV CODE 250: Mod: HCNC | Performed by: HOSPITALIST

## 2025-06-01 PROCEDURE — 25000003 PHARM REV CODE 250: Mod: HCNC | Performed by: NURSE PRACTITIONER

## 2025-06-01 PROCEDURE — 85730 THROMBOPLASTIN TIME PARTIAL: CPT | Performed by: HOSPITALIST

## 2025-06-01 PROCEDURE — 83735 ASSAY OF MAGNESIUM: CPT | Mod: HCNC | Performed by: HOSPITALIST

## 2025-06-01 PROCEDURE — A4216 STERILE WATER/SALINE, 10 ML: HCPCS | Mod: HCNC | Performed by: HOSPITALIST

## 2025-06-01 PROCEDURE — 84100 ASSAY OF PHOSPHORUS: CPT | Mod: HCNC | Performed by: HOSPITALIST

## 2025-06-01 PROCEDURE — 80048 BASIC METABOLIC PNL TOTAL CA: CPT | Mod: HCNC | Performed by: HOSPITALIST

## 2025-06-01 PROCEDURE — 85025 COMPLETE CBC W/AUTO DIFF WBC: CPT | Performed by: HOSPITALIST

## 2025-06-01 PROCEDURE — 21400001 HC TELEMETRY ROOM: Mod: HCNC

## 2025-06-01 PROCEDURE — 36415 COLL VENOUS BLD VENIPUNCTURE: CPT | Performed by: HOSPITALIST

## 2025-06-01 PROCEDURE — 63600175 PHARM REV CODE 636 W HCPCS: Mod: HCNC | Performed by: HOSPITALIST

## 2025-06-01 PROCEDURE — 97530 THERAPEUTIC ACTIVITIES: CPT | Mod: HCNC

## 2025-06-01 RX ORDER — SODIUM CHLORIDE 9 MG/ML
INJECTION, SOLUTION INTRAVENOUS ONCE
Status: DISCONTINUED | OUTPATIENT
Start: 2025-06-01 | End: 2025-06-04 | Stop reason: HOSPADM

## 2025-06-01 RX ORDER — HEPARIN SODIUM 5000 [USP'U]/ML
5000 INJECTION, SOLUTION INTRAVENOUS; SUBCUTANEOUS
Status: ACTIVE | OUTPATIENT
Start: 2025-06-01 | End: 2025-06-02

## 2025-06-01 RX ORDER — MUPIROCIN 20 MG/G
OINTMENT TOPICAL 2 TIMES DAILY
Status: DISCONTINUED | OUTPATIENT
Start: 2025-06-01 | End: 2025-06-04 | Stop reason: HOSPADM

## 2025-06-01 RX ADMIN — METHYLPREDNISOLONE 4 MG: 4 TABLET ORAL at 05:06

## 2025-06-01 RX ADMIN — SIMETHICONE 80 MG: 80 TABLET, CHEWABLE ORAL at 08:06

## 2025-06-01 RX ADMIN — AMIODARONE HYDROCHLORIDE 200 MG: 200 TABLET ORAL at 08:06

## 2025-06-01 RX ADMIN — MIDODRINE HYDROCHLORIDE 5 MG: 5 TABLET ORAL at 09:06

## 2025-06-01 RX ADMIN — SIMETHICONE 80 MG: 80 TABLET, CHEWABLE ORAL at 11:06

## 2025-06-01 RX ADMIN — MUPIROCIN: 20 OINTMENT TOPICAL at 08:06

## 2025-06-01 RX ADMIN — PANTOPRAZOLE SODIUM 40 MG: 40 TABLET, DELAYED RELEASE ORAL at 08:06

## 2025-06-01 RX ADMIN — METOPROLOL SUCCINATE 12.5 MG: 25 TABLET, EXTENDED RELEASE ORAL at 08:06

## 2025-06-01 RX ADMIN — APIXABAN 2.5 MG: 2.5 TABLET, FILM COATED ORAL at 09:06

## 2025-06-01 RX ADMIN — BRIMONIDINE TARTRATE 1 DROP: 1.5 SOLUTION/ DROPS OPHTHALMIC at 08:06

## 2025-06-01 RX ADMIN — SEVELAMER CARBONATE 800 MG: 800 TABLET, FILM COATED ORAL at 11:06

## 2025-06-01 RX ADMIN — LIDOCAINE 1 PATCH: 50 PATCH CUTANEOUS at 08:06

## 2025-06-01 RX ADMIN — TAMSULOSIN HYDROCHLORIDE 0.4 MG: 0.4 CAPSULE ORAL at 08:06

## 2025-06-01 RX ADMIN — FERROUS SULFATE TAB 325 MG (65 MG ELEMENTAL FE) 1 EACH: 325 (65 FE) TAB at 08:06

## 2025-06-01 RX ADMIN — SEVELAMER CARBONATE 800 MG: 800 TABLET, FILM COATED ORAL at 05:06

## 2025-06-01 RX ADMIN — Medication 10 ML: at 06:06

## 2025-06-01 RX ADMIN — METHYLPREDNISOLONE 4 MG: 4 TABLET ORAL at 09:06

## 2025-06-01 RX ADMIN — GABAPENTIN 200 MG: 100 CAPSULE ORAL at 09:06

## 2025-06-01 RX ADMIN — MIDODRINE HYDROCHLORIDE 5 MG: 5 TABLET ORAL at 08:06

## 2025-06-01 RX ADMIN — APIXABAN 2.5 MG: 2.5 TABLET, FILM COATED ORAL at 08:06

## 2025-06-01 RX ADMIN — METHYLPREDNISOLONE 4 MG: 4 TABLET ORAL at 12:06

## 2025-06-01 RX ADMIN — SEVELAMER CARBONATE 800 MG: 800 TABLET, FILM COATED ORAL at 08:06

## 2025-06-01 RX ADMIN — Medication 10 ML: at 03:06

## 2025-06-01 RX ADMIN — ASCORBIC ACID, THIAMINE MONONITRATE,RIBOFLAVIN, NIACINAMIDE, PYRIDOXINE HYDROCHLORIDE, FOLIC ACID, CYANOCOBALAMIN, BIOTIN, CALCIUM PANTOTHENATE, 1 CAPSULE: 100; 1.5; 1.7; 20; 10; 1; 6000; 150000; 5 CAPSULE, LIQUID FILLED ORAL at 08:06

## 2025-06-01 RX ADMIN — MUPIROCIN: 20 OINTMENT TOPICAL at 09:06

## 2025-06-01 RX ADMIN — Medication 10 ML: at 09:06

## 2025-06-01 RX ADMIN — PRAVASTATIN SODIUM 40 MG: 20 TABLET ORAL at 08:06

## 2025-06-01 RX ADMIN — BRIMONIDINE TARTRATE 1 DROP: 1.5 SOLUTION/ DROPS OPHTHALMIC at 09:06

## 2025-06-01 RX ADMIN — METHYLPREDNISOLONE 4 MG: 4 TABLET ORAL at 08:06

## 2025-06-01 RX ADMIN — LEVOTHYROXINE SODIUM 25 MCG: 25 TABLET ORAL at 06:06

## 2025-06-01 RX ADMIN — INSULIN ASPART 6 UNITS: 100 INJECTION, SOLUTION INTRAVENOUS; SUBCUTANEOUS at 08:06

## 2025-06-01 NOTE — PROGRESS NOTES
"Nephrology  Progress Note    Admit Date: 5/26/2025   LOS: 5 days     SUBJECTIVE:     Follow-up For:  GRADY    History of Present Illness:  Patient is a 75 y.o. male presents with neck/shoulder pain.  W/up in process and preliminarily MRI with acute left frontal infarct.  Labs significant for worsening GRADY/CKD (Creat 7.8, BUN 94, BNP 1253).  Pt with extensive med hx as outlined below and known to me from recent admission.  Significant renal disease and followed by [facility].  Has had rapid progression of CKD over past year with creat steadily rising 3-4-5-6-7.  Has been referred to vascular for AVF and transplant.  Seen after multiple testings.  Looks older than stated age,  frail, etc.  C/o pain in neck, head.       EPIC reviewed.  Case discussed with team.          Interval History:     No events overnight.  HD yesterday with UF 1.5L. Still awaiting DC plans.       OBJECTIVE:     Vital Signs Range (Last 24H):  /61 (BP Location: Left arm, Patient Position: Lying)   Pulse 60   Temp 98 °F (36.7 °C) (Oral)   Resp 18   Ht 6' 2" (1.88 m)   Wt 78.9 kg (173 lb 15.1 oz)   SpO2 100%   BMI 22.33 kg/m²     Temp:  [97 °F (36.1 °C)-98.3 °F (36.8 °C)]   Pulse:  [54-62]   Resp:  [18-20]   BP: (116-158)/(61-86)   SpO2:  [97 %-100 %]     I & O (Last 24H):  Intake/Output Summary (Last 24 hours) at 6/1/2025 1239  Last data filed at 6/1/2025 1233  Gross per 24 hour   Intake 960 ml   Output 2450 ml   Net -1490 ml       Physical Exam:  General appearance:  NAD this am.  Eyes:  Conjunctivae/corneas clear. PERRL.  Lungs: Normal respiratory effort,   clear to auscultation bilaterally   Heart: Regular rate and rhythm, S1, S2 normal, 3/6 murmur  Abdomen: Soft, nontender, ostomy + bowel sounds  Extremities: No cyanosis or clubbing.  trace+ edema.    Right knee painful.   Skin: Skin color pale, texture, turgor normal. No rashes or lesions  Neurological:  nonfocal  RIJ ROBINSON.       Laboratory Data:  Recent Labs   Lab " 06/01/25  0341   WBC 7.65   RBC 2.77*   HGB 8.5*   HCT 26.8*   *   MCV 97   MCH 30.7   MCHC 31.7*       BMP:   Recent Labs   Lab 06/01/25  0341   *      K 4.5      CO2 27   BUN 39*   CREATININE 3.0*   CALCIUM 7.6*   MG 1.7   PHOS 4.1     Lab Results   Component Value Date    CALCIUM 7.6 (L) 06/01/2025    PHOS 4.1 06/01/2025       Lab Results   Component Value Date    .4 (H) 05/16/2025    CALCIUM 7.6 (L) 06/01/2025    PHOS 4.1 06/01/2025       Lab Results   Component Value Date    URICACID 7.9 (H) 10/10/2024       BNP  Recent Labs   Lab 05/26/25  0350   BNP 1,253*       Medications:  Medication list was reviewed and changes noted under Assessment/Plan.    Diagnostic Results:    X-Ray Knee 1 or 2 View Right   Final Result      Moderate suprapatellar joint effusion with no acute osseous abnormality seen.      Osteoarthritis with chondrocalcinosis.         Electronically signed by: Daysi Richard   Date:    05/30/2025   Time:    10:37      IR Tunneled Catheter Insert w/o Port   Final Result      Insertion of right -sided supradiaphragmatic dual- lumen tunneled hemodialysis catheter, with tip in the expected location of the cavoatrial junction.      Plan:      The catheter may be used immediately.      _______________________________________________________________         Electronically signed by: Jamie Stokes MD   Date:    05/27/2025   Time:    13:24      X-Ray Cervical Spine 5 View W Flex Extxt   Final Result      Chronic change as above.         Electronically signed by: Kurt Bustillo MD   Date:    05/27/2025   Time:    07:46      X-Ray Hip 2 or 3 views Left with Pelvis when performed   Final Result      Please see above.         Electronically signed by: Derrek Vega   Date:    05/26/2025   Time:    14:01      US Carotid Bilateral   Final Result      No evidence of a hemodynamically significant carotid bifurcation stenosis.         Electronically signed by: Derrek Vega    Date:    05/26/2025   Time:    11:23      MRI Soft Tissue Neck Without Contrast   Final Result      No soft tissue mass or lymphadenopathy throughout the soft tissues of the neck.         Electronically signed by: Derrek Vega   Date:    05/26/2025   Time:    13:11      MRI Cervical Spine Without Contrast   Final Result      Severe degenerative changes as described producing severe foraminal and central canal stenosis from C2 through C5 and severe left foraminal stenosis at C5-6.         Electronically signed by: Jonathan Hill Jr   Date:    05/26/2025   Time:    12:16      MRA Brain without contrast   Final Result      MR angiogram of the head appears within normal limits.         Electronically signed by: Derrek Vega   Date:    05/26/2025   Time:    10:27      MRI Brain Without Contrast   Final Result   Abnormal      Punctate focus diffusion restriction about the left frontal subcortical white matter, in keeping with a small acute infarct.  No significant surrounding edema or mass effect.  No evidence for acute intracranial hemorrhage.      Sequela of microvascular ischemic change.      This report was flagged in Epic as abnormal.         Electronically signed by: Derrek Vega   Date:    05/26/2025   Time:    10:16      US Lower Extremity Veins Left   Final Result      No evidence of deep venous thrombosis in the left lower extremity.         Electronically signed by: Derrek Vega   Date:    05/26/2025   Time:    08:23      X-Ray Chest AP Portable   Final Result      Bibasilar atelectasis with chronic prominence of the pulmonary vascular markings.         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  Now has transitioned to ESRD:  -followed closely by  OM  -recently DC'd with Creat 6.3/BUN 79  -referred to vascular and transplant.   -hold diuretics  -Creat 7.8/BUN 90  -was on eliquis but held with planned ROBINSON.  Hep drip off  -bicarb tabs for worsening AGMA (CKD/GI losses) but stop with HD  -5/27:  Ext discussion with pt about RRT options vs Palliative.  Wants to do HD in [place].  Salvaging left arm for AVF/AVG next month.  Consulted Dr. Middleton for ROBINSON today but IR had to place.  Start RRT, labs, and consult SW to start HD as outpt.   -5/28:  continue with gentle HD X 3 treatments and then transition to 3/week.  Monitored ROBINSON site as may need stitch but doing well  -5/29:  HD #3 today and then TTS.  Await outpt HD arrangements in [place].    -5/30:  transitioned to TTS.    5/31: Seen on HD. Awaiting outpt HD placement   6/1: tolerated HD yesterday . Awaiting outpt chair still.  -see orders.  -Renally dose meds, avoid nephrotoxins, and monitor I/O's closely.      Acute CVA:  -MRI noted.  -defer to neurology.      Spinal Stenosis:  -on decadron.   -defer     Multifactorial anemia:  -RODRIGUE cleared by neurology  -recent IV iron  -On Epo IV 10k TTS  -transfuse if needed.    Thrombocytopenia  -Mild.  -defer    MBD:  -nephrocaps/binders  -PTH/ Phos at goal for CKD     Hypotension> HTN:  -Changed midodrine to 5 mg q 12hr and prn with dialysis  -BP in normal range     LLE DVT:  -on eliquis but held with bleeding.   -resumed      PAF and severe PA-HTN:  -defer     UC w/ ileostomy:  -not a candidate for PD     Right knee pain:  -s/p drain by Ortho  -not gout  -defer.      DISPO:     Multiple comorbidities  DNR        Thanks for consult  See above  Will follow along.        High MDM complexity necessary to treat or prevent imminent or life-threatening deterioration of the following conditions: ESRD  Time spent personally by me on the following activities 50 min: development of treatment plan with patient or surrogate, discussions with consultants, interpretation of cardiac  output measurements, examination of patient, ordering and performing treatments and interventions, evaluation of patient's response to treatment, obtaining history from patient or surrogate, ordering and review of laboratory studies, ordering and review of radiographic studies, re-evaluation of patient's condition, pulse oximetry and blood draw for specimens

## 2025-06-01 NOTE — ASSESSMENT & PLAN NOTE
-Nutrition consulted. Most recent weight and BMI monitored-   -Measurements:  Wt Readings from Last 1 Encounters:   06/01/25 78.9 kg (173 lb 15.1 oz)   Body mass index is 22.33 kg/m².

## 2025-06-01 NOTE — PLAN OF CARE
Care plan reviewed with patient   Problem: Adult Inpatient Plan of Care  Goal: Plan of Care Review  Outcome: Progressing  Goal: Patient-Specific Goal (Individualized)  Outcome: Progressing  Goal: Absence of Hospital-Acquired Illness or Injury  Outcome: Progressing  Goal: Optimal Comfort and Wellbeing  Outcome: Progressing  Goal: Readiness for Transition of Care  Outcome: Progressing      Cyclosporine Counseling:  I discussed with the patient the risks of cyclosporine including but not limited to hypertension, gingival hyperplasia,myelosuppression, immunosuppression, liver damage, kidney damage, neurotoxicity, lymphoma, and serious infections. The patient understands that monitoring is required including baseline blood pressure, CBC, CMP, lipid panel and uric acid, and then 1-2 times monthly CMP and blood pressure.

## 2025-06-01 NOTE — PROGRESS NOTES
"[address] Medicine  Progress Note    Patient Name: Jarrett Charlton Jr.  MRN: [id number]  Patient Class: IP- Inpatient   Admission Date: 5/26/2025  Length of Stay: 5 days  Attending Physician: Darrell Cheema MD  Primary Care Provider: Olga Hampton MD        Subjective     Principal Problem:Cervical spinal stenosis        HPI:  As per Poli Damon MD:    "Mr. Charlton is a 75 year old man with autonomic dysfunction on midodrine, coronary artery disease, progressing CKDV, BPH prostate cancer s/p TURP, BPH, cervical disk disease (s/p spinal fusion in the 80s), ulcerative colitis (s/p colectomy and illiostomy, ventricular tachycardia / ?atrial fibrillation s/p ablation who presented for evaluation of severe pain in his right shoulder / neck and left groin.  History is obtained by patient and his daughter at bedside.  They note a fall several months ago with abrasion to his lumbar back and shins.  He also had a fall 3 days ago which he states occurred when he was turning and tripped over his shoes.  He didn't hit his head, but did fall onto his right hand/arm.  He had no severe pain at that time.  He states that yesterday he woke up feeling ok, but as the day progressed he developed severe left inner groin pain.  The pain has been progressively worse throughout the day and is exacerbated by walking and laying down.  He also reports developing severe pain in his neck starting at the posterior base of his cranium and radiating down to his right shoulder.  The pain is severe and also worse with laying flat.  He describes the neck/shoulder pain as throbbing and intense.  He reports baseline dyspnea on exertion which is stable.  He denies fevers, nausea, vomiting, diarrhea, diminished urine output, cough, increased leg swelling, diminished oral intake, palpitations, chest pain and abdominal pain.  In the ED he was treated with methocarbamol, morphine and oxycodone with moderate relief " "of his pain.      Of note, patient recently treated at [facility] for campylobacter gastroenteritis and enterobacter bacteremia (discharged 4/28).  He was also treated at [facility] for worsening renal failure and hyperkalemia and discharged 5/18 with referral to vascular surgery for AV graft placement.  His daughter reports it is planned to be placed in July.  Daughter also reports planned ERCP/EUS for pancreatic lesion in July and cardiac ablation planned for July 15th."    Overview/Hospital Course:  Patient is 75-year-old man with a non dysfunction on midodrine, coronary artery disease, history of arrhythmias including atrial fibrillation and ventricular tachycardia,  chronic kidney disease stage 5, ulcerative colitis status post remote colectomy with ileostomy, cervical spinal disease status post remote spinal fusion who presents with severe pain in the right shoulder and left groin along with evidence of worsening renal function.    Nephrology consult recommended initiating hemodialysis.  A tunneled dialysis catheter placed today.    Recent imaging showed evidence of small acute left frontal stroke likely unrelated to current presentation.  Evaluated by neurology recommended continuing anticoagulation.    Dr. Adan Santiago (Spine Orthopedic Surgery) evaluated patient and recommended high-dose steroids to treat spinal stenosis along with therapy.    Patient also underwent an arthrocentesis of his right knee.  Patient with 50,000 white cells with neutrophil predominance.  Gram stain negative.    Cultures submitted.    Interval History: Right knee improved.  Back/neck pain doing well.    Review of Systems   Constitutional:  Negative for chills and fever.   Respiratory:  Negative for shortness of breath.    Cardiovascular:  Negative for chest pain.   Gastrointestinal:  Negative for abdominal pain, constipation, diarrhea, nausea and vomiting.   Musculoskeletal:  Positive for arthralgias and back pain.     Objective: "     Vital Signs (Most Recent):  Temp: 97 °F (36.1 °C) (06/01/25 0747)  Pulse: 60 (06/01/25 0747)  Resp: 18 (06/01/25 0747)  BP: (!) 149/70 (06/01/25 0747)  SpO2: 98 % (06/01/25 0747) Vital Signs (24h Range):  Temp:  [97 °F (36.1 °C)-98.3 °F (36.8 °C)] 97 °F (36.1 °C)  Pulse:  [54-62] 60  Resp:  [17-20] 18  SpO2:  [97 %-100 %] 98 %  BP: (128-158)/(66-86) 149/70     Weight: 78.9 kg (173 lb 15.1 oz)  Body mass index is 22.33 kg/m².    Intake/Output Summary (Last 24 hours) at 6/1/2025 1057  Last data filed at 6/1/2025 0803  Gross per 24 hour   Intake 960 ml   Output 2100 ml   Net -1140 ml         Physical Exam  Constitutional:       Appearance: He is ill-appearing.   Cardiovascular:      Rate and Rhythm: Normal rate and regular rhythm.      Heart sounds: Normal heart sounds. No murmur heard.     No friction rub. No gallop.      Comments: Tunneled hemodialysis catheter to right chest wall, without bleeding.  Pulmonary:      Effort: Pulmonary effort is normal. No respiratory distress.      Breath sounds: Normal breath sounds. No wheezing or rales.   Abdominal:      General: Bowel sounds are normal. There is no distension.      Palpations: Abdomen is soft.      Tenderness: There is no abdominal tenderness.      Comments:  Colostomy with stool in bag.   Musculoskeletal:         General: Tenderness present. Normal range of motion.      Comments: Right knee improved   Skin:     General: Skin is warm and dry.      Coloration: Skin is not pale.      Findings: No erythema or rash.   Neurological:      Mental Status: He is alert and oriented to person, place, and time.               Significant Labs: All pertinent labs within the past 24 hours have been reviewed.    Significant Imaging: I have reviewed all pertinent imaging results/findings within the past 24 hours.      Assessment & Plan  Cervical spinal stenosis  Neck pain on right side  Effusion of right knee  Patient presents with significant worsening debility from baseline.  MRI C-spine shows severe degenerative changes producing severe foraminal and central canal stenosis from C2 through C5 and severe left foraminal stenosis at C5-6.  MRI soft tissue neck shows no soft tissue mass or lymphadenopathy throughout the soft tissues of the neck.     Dr. Adan Santiago (Spine Orthopedic Surgery) evaluated patient and recommended high-dose steroids to treat spinal stenosis along with therapy.    Patient also underwent an arthrocentesis of his right knee.  Patient with 50,000 white cells with neutrophil predominance.  Gram stain negative.   Dr. Merrill suspect infusion likely inflammatory and not likely a septic joint.   Cultures submitted and started on empiric antibiotic therapy pending culture results.  Culture remain negative.  Knee improving with anti-inflammatory medications.  Improved range of motion.  Discontinue further empiric antibiotics.  We will need close outpatient follow up.  History of deep venous thrombosis (DVT) of distal vein of left lower extremity  Doppler US of left leg without any evidence of DVT presently.    Anticoagulation held for tunneled line placement..  Bleeding from dialysis catheter resolved.  Restarted apixaban.    Falls  Debility  Autonomic dysfunction  Acute ischemic stroke  Orthostatic hypotension  -Noted at least 2 falls recently and saw Dr. Patel with neurology a few days ago.  His note was reviewed, noted concern for possible stroke and I ordered his suggested imaging workup.    -Suspect falls and debility are multifactorial.  -MRI brain notable for punctate focus diffusion restriction about the left frontal subcortical white matter, in keeping with a small acute infarct. No significant surrounding edema or mass effect. No evidence for acute intracranial hemorrhage.   -MRA brain was normal  Echocardiogram did not show thrombolytic source.  Evaluated by neurology recommended continuing anticoagulation.    Anticoagulation held for  hemodialysis  line placement.  Restarted on apixaban.  Acute renal failure with acute tubular necrosis superimposed on stage 5 chronic kidney disease, not on chronic dialysis  Dialysis line placement and HD initiation this hospitalization.  Patient underwent dialysis yesterday.  Metabolic derangements improved.   Acute on chronic combined systolic and diastolic congestive heart failure  Essential hypertension  CAD (coronary artery disease)  -On admit he denies any sob despite elevated BNP and peripheral edema.  Denies chest pain.  Troponin negative.  -Echo 2/25/25 showed EF 30%, indeterminate diastolic function, mild LA dilation, mild/moderate MR  -Strict ins/outs and daily weights with low sodium fluid restricted diet  -Continue statin, midodrine and metoprolol.  Restarted apixaban.  Atrial paroxysmal tachycardia  -Noted history - ?pAfib? And plans for ablation at [facility] 7/15/25.  -Continue home amiodarone and metoprolol  Restarted apixaban  Thrombocytopenia  Mild, stable.  T2DM (type 2 diabetes mellitus)  -A1c 5.7  -Home regimen includes glimepiride - holding for now  -Sugars reasonably controlled  -Will treat with SSI ac/hs for now  History of ulcerative colitis  High output ileostomy  -Noted history  -Ostomy functioning as it should per patient.  Moderate protein-calorie malnutrition  -Nutrition consulted. Most recent weight and BMI monitored-   -Measurements:  Wt Readings from Last 1 Encounters:   06/01/25 78.9 kg (173 lb 15.1 oz)   Body mass index is 22.33 kg/m².  Gout  -Noted history  -Hold prior colchicine.    Hypothyroidism  -Continue home levothyroxine  Type 2 diabetes mellitus  Reasonably controlled with current regimen.  Will continue with current regimen and continue to monitor.  ACP (advance care planning)  -Advance Care Planning Date: 05/26/2025 I engaged patient and his daughter, at bedside, in discussion surrounding his goals of care at the end of life.  We discussed the pros and cons of aggressive treatment in the  face of cardiac arrest.  He wishes to continue with aggressive medical care, including dialysis and multiple upcoming procedures.  However, if he were to suffer a cardiac arrest he wishes to be allowed a peaceful death.  I explained that this is consistent with DNR and he was in agreement.  DNR order placed.  I spent 10 minutes ACP time today.  VTE Risk Mitigation (From admission, onward)           Ordered     apixaban tablet 2.5 mg  2 times daily         05/29/25 0930     heparin (porcine) injection 4,000 Units  As needed (PRN)         05/28/25 1306     Place sequential compression device  Until discontinued         05/26/25 0808     IP VTE HIGH RISK PATIENT  Once         05/26/25 0808     Place sequential compression device  Until discontinued         05/26/25 0546                      Darrell Cheema MD  Department of Hospital Medicine   [facility] - Med Surg ([address])

## 2025-06-01 NOTE — PT/OT/SLP PROGRESS
"Physical Therapy Treatment    Patient Name:  Jarrett Charlton Jr.   MRN:  [id number]    Recommendations:     Discharge Recommendations: Low Intensity Therapy  Discharge Equipment Recommendations: none (has needed DME)  Barriers to discharge: None    Assessment:     Jarrett Charlton Jr. is a 75 y.o. male admitted with a medical diagnosis of Cervical spinal stenosis.  He presents with the following impairments/functional limitations: weakness, impaired self care skills, impaired functional mobility, gait instability, impaired balance, decreased coordination, decreased lower extremity function, decreased safety awareness, pain, decreased ROM, impaired skin, edema, impaired joint extensibility.    Patient reports marked improvement in R knee pain, has been ambulating in room on his own without significant difficulty. Continues to require RW 2/2 limited knee ROM, reports having multiple pieces of DME that his wife utilized. Has ramp entry to home, so no stair navigation concerns. Ambulatory with supervision without overt instability while using RW. Rec for dc to LIT.    Rehab Prognosis: Good; patient would benefit from acute skilled PT services to address these deficits and reach maximum level of function.    Recent Surgery: Procedure(s) (LRB):  Insertion,catheter,tunneled (N/A) 5 Days Post-Op    Plan:     During this hospitalization, patient to be seen 3 x/week to address the identified rehab impairments via gait training, therapeutic activities, therapeutic exercises, neuromuscular re-education and progress toward the following goals:    Plan of Care Expires:  06/28/25    Subjective     Chief Complaint: Tired after walking already  Patient/Family Comments/goals: Goal to go home tomorrow; Patient agreeable to PT treatment.  Pain/Comfort:  Pain Rating 1:  (unrated, reports improved R knee pain compared to admission, continued "grinding" cervical pain)  Pain Rating Post-Intervention 1:  (appears comfortable at " "rest)      Objective:     Communicated with RN prior to session.  Patient found HOB elevated with peripheral IV, telemetry, colostomy, central line upon PT entry to room.     General Precautions: Standard, aspiration, diabetic, fall  Orthopedic Precautions: N/A  Braces: N/A  Respiratory Status: Room air     Patient donned non slip socks for OOB mobility.    Functional Mobility:  Bed Mobility:     Supine to Sit: modified independence, supervision, and use of HB features (HOB elevated, bed railing)  Transfers:     Sit to Stand:  supervision with rolling walker  Gait: x60 ft with RW and supervision. No overt LOB or gross antalgia from R knee pain. Dmeo's increased forward flexion of trunk with increased thoracic kyphosis with fixed cervical flexion. Continues to utilize RW for offloading and per pt due to "limited ROM of knee"      AM-PAC 6 CLICK MOBILITY  Turning over in bed (including adjusting bedclothes, sheets and blankets)?: 4  Sitting down on and standing up from a chair with arms (e.g., wheelchair, bedside commode, etc.): 3  Moving from lying on back to sitting on the side of the bed?: 3  Moving to and from a bed to a chair (including a wheelchair)?: 3  Need to walk in hospital room?: 3  Climbing 3-5 steps with a railing?: 3  Basic Mobility Total Score: 19       Treatment & Education:  Transfers from variety of surfaces (couch, EOB), focus of gait on increased endruance    Patient left sitting edge of bed with all lines intact, call button in reach, and RN notified..    GOALS:   Multidisciplinary Problems       Physical Therapy Goals          Problem: Physical Therapy    Goal Priority Disciplines Outcome Interventions   Physical Therapy Goal     PT, PT/OT Progressing    Description: Goals to be met by: 25    Patient will increase functional independence with mobility by performin) Supine<>Sit with RW, Mod I  2) Sit <>Stand with RW, Mod I   3) Bed <>Chair with RW, Mod I   4) Pt to ambulate 150 feet " with RW Mod I.                        DME Justifications:  No DME recommended requiring DME justifications    Time Tracking:     PT Received On: 06/01/25  PT Start Time: 1409     PT Stop Time: 1424  PT Total Time (min): 15 min     Billable Minutes: Therapeutic Activity 15    Treatment Type: Treatment  PT/PTA: PT     Number of PTA visits since last PT visit: 0     06/01/2025

## 2025-06-01 NOTE — SUBJECTIVE & OBJECTIVE
Interval History: Right knee improved.  Back/neck pain doing well.    Review of Systems   Constitutional:  Negative for chills and fever.   Respiratory:  Negative for shortness of breath.    Cardiovascular:  Negative for chest pain.   Gastrointestinal:  Negative for abdominal pain, constipation, diarrhea, nausea and vomiting.   Musculoskeletal:  Positive for arthralgias and back pain.     Objective:     Vital Signs (Most Recent):  Temp: 97 °F (36.1 °C) (06/01/25 0747)  Pulse: 60 (06/01/25 0747)  Resp: 18 (06/01/25 0747)  BP: (!) 149/70 (06/01/25 0747)  SpO2: 98 % (06/01/25 0747) Vital Signs (24h Range):  Temp:  [97 °F (36.1 °C)-98.3 °F (36.8 °C)] 97 °F (36.1 °C)  Pulse:  [54-62] 60  Resp:  [17-20] 18  SpO2:  [97 %-100 %] 98 %  BP: (128-158)/(66-86) 149/70     Weight: 78.9 kg (173 lb 15.1 oz)  Body mass index is 22.33 kg/m².    Intake/Output Summary (Last 24 hours) at 6/1/2025 1057  Last data filed at 6/1/2025 0803  Gross per 24 hour   Intake 960 ml   Output 2100 ml   Net -1140 ml         Physical Exam  Constitutional:       Appearance: He is ill-appearing.   Cardiovascular:      Rate and Rhythm: Normal rate and regular rhythm.      Heart sounds: Normal heart sounds. No murmur heard.     No friction rub. No gallop.      Comments: Tunneled hemodialysis catheter to right chest wall, without bleeding.  Pulmonary:      Effort: Pulmonary effort is normal. No respiratory distress.      Breath sounds: Normal breath sounds. No wheezing or rales.   Abdominal:      General: Bowel sounds are normal. There is no distension.      Palpations: Abdomen is soft.      Tenderness: There is no abdominal tenderness.      Comments:  Colostomy with stool in bag.   Musculoskeletal:         General: Tenderness present. Normal range of motion.      Comments: Right knee improved   Skin:     General: Skin is warm and dry.      Coloration: Skin is not pale.      Findings: No erythema or rash.   Neurological:      Mental Status: He is alert and  oriented to person, place, and time.               Significant Labs: All pertinent labs within the past 24 hours have been reviewed.    Significant Imaging: I have reviewed all pertinent imaging results/findings within the past 24 hours.

## 2025-06-01 NOTE — ASSESSMENT & PLAN NOTE
-Noted history - ?pAfib? And plans for ablation at [facility] [place] 7/15/25.  -Continue home amiodarone and metoprolol  Restarted apixaban

## 2025-06-02 LAB
ABSOLUTE EOSINOPHIL (OHS): 0.02 K/UL
ABSOLUTE MONOCYTE (OHS): 0.3 K/UL (ref 0.3–1)
ABSOLUTE NEUTROPHIL COUNT (OHS): 6.9 K/UL (ref 1.8–7.7)
ANION GAP (OHS): 11 MMOL/L (ref 8–16)
APTT PPP: 25.1 SECONDS (ref 21–32)
BASOPHILS # BLD AUTO: 0 K/UL
BASOPHILS NFR BLD AUTO: 0 %
BUN SERPL-MCNC: 61 MG/DL (ref 8–23)
CALCIUM SERPL-MCNC: 7.5 MG/DL (ref 8.7–10.5)
CHLORIDE SERPL-SCNC: 101 MMOL/L (ref 95–110)
CO2 SERPL-SCNC: 23 MMOL/L (ref 23–29)
CREAT SERPL-MCNC: 4 MG/DL (ref 0.5–1.4)
ERYTHROCYTE [DISTWIDTH] IN BLOOD BY AUTOMATED COUNT: 16.7 % (ref 11.5–14.5)
GFR SERPLBLD CREATININE-BSD FMLA CKD-EPI: 15 ML/MIN/1.73/M2
GLUCOSE SERPL-MCNC: 149 MG/DL (ref 70–110)
HCT VFR BLD AUTO: 25.6 % (ref 40–54)
HGB BLD-MCNC: 8.1 GM/DL (ref 14–18)
IMM GRANULOCYTES # BLD AUTO: 0.03 K/UL (ref 0–0.04)
IMM GRANULOCYTES NFR BLD AUTO: 0.4 % (ref 0–0.5)
LYMPHOCYTES # BLD AUTO: 0.33 K/UL (ref 1–4.8)
MAGNESIUM SERPL-MCNC: 1.7 MG/DL (ref 1.6–2.6)
MCH RBC QN AUTO: 30.6 PG (ref 27–31)
MCHC RBC AUTO-ENTMCNC: 31.6 G/DL (ref 32–36)
MCV RBC AUTO: 97 FL (ref 82–98)
NUCLEATED RBC (/100WBC) (OHS): 0 /100 WBC
PHOSPHATE SERPL-MCNC: 4.5 MG/DL (ref 2.7–4.5)
PLATELET # BLD AUTO: 120 K/UL (ref 150–450)
PMV BLD AUTO: 9.2 FL (ref 9.2–12.9)
POCT GLUCOSE: 133 MG/DL (ref 70–110)
POCT GLUCOSE: 151 MG/DL (ref 70–110)
POCT GLUCOSE: 160 MG/DL (ref 70–110)
POCT GLUCOSE: 202 MG/DL (ref 70–110)
POCT GLUCOSE: 250 MG/DL (ref 70–110)
POTASSIUM SERPL-SCNC: 4.6 MMOL/L (ref 3.5–5.1)
RBC # BLD AUTO: 2.65 M/UL (ref 4.6–6.2)
RELATIVE EOSINOPHIL (OHS): 0.3 %
RELATIVE LYMPHOCYTE (OHS): 4.4 % (ref 18–48)
RELATIVE MONOCYTE (OHS): 4 % (ref 4–15)
RELATIVE NEUTROPHIL (OHS): 90.9 % (ref 38–73)
SODIUM SERPL-SCNC: 135 MMOL/L (ref 136–145)
WBC # BLD AUTO: 7.58 K/UL (ref 3.9–12.7)

## 2025-06-02 PROCEDURE — 25000003 PHARM REV CODE 250: Mod: HCNC | Performed by: NURSE PRACTITIONER

## 2025-06-02 PROCEDURE — 85730 THROMBOPLASTIN TIME PARTIAL: CPT | Mod: HCNC | Performed by: HOSPITALIST

## 2025-06-02 PROCEDURE — 63600175 PHARM REV CODE 636 W HCPCS: Mod: HCNC | Performed by: HOSPITALIST

## 2025-06-02 PROCEDURE — 84100 ASSAY OF PHOSPHORUS: CPT | Mod: HCNC | Performed by: HOSPITALIST

## 2025-06-02 PROCEDURE — 97116 GAIT TRAINING THERAPY: CPT | Mod: HCNC,CQ

## 2025-06-02 PROCEDURE — 83735 ASSAY OF MAGNESIUM: CPT | Mod: HCNC | Performed by: HOSPITALIST

## 2025-06-02 PROCEDURE — A4216 STERILE WATER/SALINE, 10 ML: HCPCS | Mod: HCNC | Performed by: HOSPITALIST

## 2025-06-02 PROCEDURE — 97535 SELF CARE MNGMENT TRAINING: CPT | Mod: HCNC

## 2025-06-02 PROCEDURE — 25000003 PHARM REV CODE 250: Mod: HCNC | Performed by: HOSPITALIST

## 2025-06-02 PROCEDURE — 36415 COLL VENOUS BLD VENIPUNCTURE: CPT | Mod: HCNC | Performed by: HOSPITALIST

## 2025-06-02 PROCEDURE — 94761 N-INVAS EAR/PLS OXIMETRY MLT: CPT | Mod: HCNC

## 2025-06-02 PROCEDURE — 80048 BASIC METABOLIC PNL TOTAL CA: CPT | Mod: HCNC | Performed by: HOSPITALIST

## 2025-06-02 PROCEDURE — 85025 COMPLETE CBC W/AUTO DIFF WBC: CPT | Mod: HCNC | Performed by: HOSPITALIST

## 2025-06-02 PROCEDURE — 21400001 HC TELEMETRY ROOM: Mod: HCNC

## 2025-06-02 RX ORDER — HEPARIN SODIUM 1000 [USP'U]/ML
5000 INJECTION, SOLUTION INTRAVENOUS; SUBCUTANEOUS
Status: DISCONTINUED | OUTPATIENT
Start: 2025-06-02 | End: 2025-06-02

## 2025-06-02 RX ADMIN — METHYLPREDNISOLONE 4 MG: 4 TABLET ORAL at 01:06

## 2025-06-02 RX ADMIN — ASCORBIC ACID, THIAMINE MONONITRATE,RIBOFLAVIN, NIACINAMIDE, PYRIDOXINE HYDROCHLORIDE, FOLIC ACID, CYANOCOBALAMIN, BIOTIN, CALCIUM PANTOTHENATE, 1 CAPSULE: 100; 1.5; 1.7; 20; 10; 1; 6000; 150000; 5 CAPSULE, LIQUID FILLED ORAL at 09:06

## 2025-06-02 RX ADMIN — MUPIROCIN: 20 OINTMENT TOPICAL at 08:06

## 2025-06-02 RX ADMIN — SEVELAMER CARBONATE 800 MG: 800 TABLET, FILM COATED ORAL at 06:06

## 2025-06-02 RX ADMIN — MUPIROCIN: 20 OINTMENT TOPICAL at 09:06

## 2025-06-02 RX ADMIN — BRIMONIDINE TARTRATE 1 DROP: 1.5 SOLUTION/ DROPS OPHTHALMIC at 09:06

## 2025-06-02 RX ADMIN — Medication 10 ML: at 01:06

## 2025-06-02 RX ADMIN — FERROUS SULFATE TAB 325 MG (65 MG ELEMENTAL FE) 1 EACH: 325 (65 FE) TAB at 09:06

## 2025-06-02 RX ADMIN — GABAPENTIN 200 MG: 100 CAPSULE ORAL at 08:06

## 2025-06-02 RX ADMIN — LIDOCAINE 1 PATCH: 50 PATCH CUTANEOUS at 09:06

## 2025-06-02 RX ADMIN — SEVELAMER CARBONATE 800 MG: 800 TABLET, FILM COATED ORAL at 01:06

## 2025-06-02 RX ADMIN — METHYLPREDNISOLONE 4 MG: 4 TABLET ORAL at 09:06

## 2025-06-02 RX ADMIN — METOPROLOL SUCCINATE 12.5 MG: 25 TABLET, EXTENDED RELEASE ORAL at 09:06

## 2025-06-02 RX ADMIN — TAMSULOSIN HYDROCHLORIDE 0.4 MG: 0.4 CAPSULE ORAL at 09:06

## 2025-06-02 RX ADMIN — Medication 10 ML: at 05:06

## 2025-06-02 RX ADMIN — PRAVASTATIN SODIUM 40 MG: 20 TABLET ORAL at 09:06

## 2025-06-02 RX ADMIN — METHYLPREDNISOLONE 4 MG: 4 TABLET ORAL at 08:06

## 2025-06-02 RX ADMIN — PANTOPRAZOLE SODIUM 40 MG: 40 TABLET, DELAYED RELEASE ORAL at 09:06

## 2025-06-02 RX ADMIN — BRIMONIDINE TARTRATE 1 DROP: 1.5 SOLUTION/ DROPS OPHTHALMIC at 08:06

## 2025-06-02 RX ADMIN — Medication 10 ML: at 09:06

## 2025-06-02 RX ADMIN — AMIODARONE HYDROCHLORIDE 200 MG: 200 TABLET ORAL at 09:06

## 2025-06-02 RX ADMIN — APIXABAN 2.5 MG: 2.5 TABLET, FILM COATED ORAL at 09:06

## 2025-06-02 RX ADMIN — INSULIN ASPART 4 UNITS: 100 INJECTION, SOLUTION INTRAVENOUS; SUBCUTANEOUS at 08:06

## 2025-06-02 RX ADMIN — APIXABAN 2.5 MG: 2.5 TABLET, FILM COATED ORAL at 08:06

## 2025-06-02 RX ADMIN — INSULIN ASPART 4 UNITS: 100 INJECTION, SOLUTION INTRAVENOUS; SUBCUTANEOUS at 06:06

## 2025-06-02 RX ADMIN — LEVOTHYROXINE SODIUM 25 MCG: 25 TABLET ORAL at 05:06

## 2025-06-02 RX ADMIN — SEVELAMER CARBONATE 800 MG: 800 TABLET, FILM COATED ORAL at 09:06

## 2025-06-02 NOTE — PLAN OF CARE
CM called [facility] to check status of chair. CM was informed it was still in review on as the office is only open MWF    1300 CM called to check status of HD chair set up. CM was informed the fax was never received    1400 CM resent the fax    [facility] confirmed receipt of fax, CM was informed they would have an answer tomorrow

## 2025-06-02 NOTE — PLAN OF CARE
Pt lying in bed, assesment complete no pain or distress noted. Call line is within reach and the bed has been lowered.  Problem: Adult Inpatient Plan of Care  Goal: Plan of Care Review  Outcome: Progressing  Goal: Patient-Specific Goal (Individualized)  Outcome: Progressing  Goal: Absence of Hospital-Acquired Illness or Injury  Outcome: Progressing  Goal: Optimal Comfort and Wellbeing  Outcome: Progressing  Goal: Readiness for Transition of Care  Outcome: Progressing     Problem: Diabetes Comorbidity  Goal: Blood Glucose Level Within Targeted Range  Outcome: Progressing     Problem: Sepsis/Septic Shock  Goal: Optimal Coping  Outcome: Progressing  Goal: Absence of Bleeding  Outcome: Progressing  Goal: Blood Glucose Level Within Targeted Range  Outcome: Progressing  Goal: Absence of Infection Signs and Symptoms  Outcome: Progressing  Goal: Optimal Nutrition Intake  Outcome: Progressing     Problem: Wound  Goal: Optimal Coping  Outcome: Progressing  Goal: Optimal Functional Ability  Outcome: Progressing  Goal: Absence of Infection Signs and Symptoms  Outcome: Progressing  Goal: Improved Oral Intake  Outcome: Progressing  Goal: Optimal Pain Control and Function  Outcome: Progressing  Goal: Skin Health and Integrity  Outcome: Progressing  Goal: Optimal Wound Healing  Outcome: Progressing     Problem: Fall Injury Risk  Goal: Absence of Fall and Fall-Related Injury  Outcome: Progressing     Problem: Infection  Goal: Absence of Infection Signs and Symptoms  Outcome: Progressing     Problem: Pain Acute  Goal: Optimal Pain Control and Function  Outcome: Progressing

## 2025-06-02 NOTE — PT/OT/SLP PROGRESS
Occupational Therapy   Treatment    Name: Jarrett Charlton Jr.  MRN: [id number]  Admitting Diagnosis:  Cervical spinal stenosis  6 Days Post-Op    Recommendations:     Discharge Recommendations: Low Intensity Therapy (Assist from family as needed)  Discharge Equipment Recommendations:   (Transport wheelchair and basic cushion)  Barriers to discharge:   (Defer to MD)    Assessment:     Jarrett Charlton Jr. is a 75 y.o. male with a medical diagnosis of Cervical spinal stenosis.  He presents with the following performance deficits affecting function: weakness, impaired endurance, impaired self care skills, impaired functional mobility, gait instability, impaired balance, decreased coordination, decreased lower extremity function, pain, decreased ROM, impaired cardiopulmonary response to activity, impaired skin.     Rehab Prognosis:  Fair/Good to return to PLOF; patient would benefit from acute skilled OT services to address these deficits and reach maximum level of function.       Plan:     Patient to be seen 5 x/week to address the above listed problems via self-care/home management, therapeutic activities, therapeutic exercises  Plan of Care Expires: 06/27/25  Plan of Care Reviewed with: patient    Subjective     Chief Complaint: Not really wanting to get out of the bed again but agreeable to participating in OT session.  Patient/Family Comments/goals: Pt stating he has difficulty with LB dressing at times and receptive to education and training in use of AE for increased Indep with LB dressing.  Pain/Comfort:  Pain Rating 1:  (Not rating pain but stating his right knee pain is much better.)  Pain Addressed 1:  (Pt stating his right knee pain started due to being taken off his gout medication when he was admitted to hospital.)    Objective:     Patient found HOB elevated with colostomy, peripheral IV, telemetry (Right subclavian hemodialysis catheter) upon OT entry to room.    General Precautions: Standard, aspiration,  diabetic, fall    Orthopedic Precautions:N/A  Braces: N/A  Respiratory Status: Room air     Occupational Performance:     Bed Mobility:    Mod I      Functional Mobility/Transfers:  Sit to stand: Mod I with RW  Toilet transfer: Supervision/Mod I with RW  Functional Mobility: Supervision/Mod I level with RW  OT offered to get pt a recliner for him to use in his room but pt reporting he doesn't like recliners and will sit on the sofa if needed.  OT recommended pt use if as a chair without using the reclining part of the chair but pt still opposed to idea.    Activities of Daily Living:  Toileting: Supervision/Mod I level, ileostomy  LB Dressing: Education and training in use of reacher and sock aid for LB dressing.  Pt donned elastic waist pants at Min A using reacher and donned sock aid at SBA using sock aid.  Pt doffed socks at Min A using reacher.    [facility] 6 Click ADL: 22    Treatment & Education:  Role of OT, POC, ADL and ADL mobility training and safety, use of AE for LB dressing, discharge recs    Patient left sitting edge of bed with all lines intact, call button in reach, and nurse notified.  Bandage on pt's finger soaked with blood.  Pt asked OT to notify his nurse, Micaela.     GOALS:   Multidisciplinary Problems       Occupational Therapy Goals          Problem: Occupational Therapy    Goal Priority Disciplines Outcome Interventions   Occupational Therapy Goal     OT, PT/OT Progressing    Description: Goals to be met by: 6/21/25     Patient will increase functional independence with ADLs by performing:    UB dressing at Supervision level, including retrieval of clothing.   LB dressing at Supervision level, including retrieval of clothing.   Toileting at Supervision level.   Grooming standing at sink at Supervision level.   Toilet transfer at Supervision level.   Sponge bathing at Supervision  level.                          DME Justifications:  Miryam has a mobility limitation that significantly impairs  his ability to participate in one or more mobility related activities in the community.  The mobility limitation cannot be sufficiently resolved by the use of a cane of walker. The use of a transport wheelchair will significantly improved pt's ability to participate in in the community and the pt will use it on a regular basis outside the home.  Jarrett has expressed his willingness to use a transport wheelchair outside the home. He also has a caregiver who is available, willing and able to provide assistance with the wheelchair when needed.     Time Tracking:     OT Date of Treatment: 06/02/25  OT Start Time: 1009  OT Stop Time: 1038  OT Total Time (min): 29 min    Billable Minutes:Self Care/Home Management 29    OT/CRISTI: OT          6/2/2025

## 2025-06-02 NOTE — ASSESSMENT & PLAN NOTE
Dialysis line placement and HD initiation this hospitalization.  Patient underwent dialysis yesterday.  Metabolic derangements improved. Awaiting outpatient dialysis chair secure prior to discharge.  Pending.

## 2025-06-02 NOTE — PLAN OF CARE
Medicare Message     Important Message from Medicare regarding Discharge Appeal Rights Explained to patient/caregiver; Signed/date by patient/caregiver -- Explained to patient/caregiver; Signed/date by patient/caregiver   Date IMM was signed 5/30/2025 -- 6/2/2025   Time IMM was signed [id number] -- [id number]

## 2025-06-02 NOTE — PLAN OF CARE
Patient is Aapx4 , care plan reviewed with patient.      Problem: Adult Inpatient Plan of Care  Goal: Plan of Care Review  Outcome: Progressing  Goal: Patient-Specific Goal (Individualized)  Outcome: Progressing  Goal: Absence of Hospital-Acquired Illness or Injury  Outcome: Progressing  Goal: Optimal Comfort and Wellbeing  Outcome: Progressing  Goal: Readiness for Transition of Care  Outcome: Progressing     Problem: Diabetes Comorbidity  Goal: Blood Glucose Level Within Targeted Range  Outcome: Progressing     Problem: Sepsis/Septic Shock  Goal: Optimal Coping  Outcome: Progressing  Goal: Absence of Bleeding  Outcome: Progressing  Goal: Blood Glucose Level Within Targeted Range  Outcome: Progressing  Goal: Absence of Infection Signs and Symptoms  Outcome: Progressing  Goal: Optimal Nutrition Intake  Outcome: Progressing

## 2025-06-02 NOTE — PROGRESS NOTES
"Nephrology  Progress Note    Admit Date: 5/26/2025   LOS: 6 days     SUBJECTIVE:     Follow-up For:  GRADY    History of Present Illness:  Patient is a 75 y.o. male presents with neck/shoulder pain.  W/up in process and preliminarily MRI with acute left frontal infarct.  Labs significant for worsening GRADY/CKD (Creat 7.8, BUN 94, BNP 1253).  Pt with extensive med hx as outlined below and known to me from recent admission.  Significant renal disease and followed by [facility].  Has had rapid progression of CKD over past year with creat steadily rising 3-4-5-6-7.  Has been referred to vascular for AVF and transplant.  Seen after multiple testings.  Looks older than stated age,  frail, etc.  C/o pain in neck, head.       EPIC reviewed.  Case discussed with team.          Interval History:         Doing better.  Awaiting HD setup.  Discussed with team.       No CP/SOB/N/V/D/F/C.    OBJECTIVE:     Vital Signs Range (Last 24H):  BP (!) 153/77 (BP Location: Left arm, Patient Position: Lying)   Pulse 61   Temp 98.1 °F (36.7 °C) (Oral)   Resp 18   Ht 6' 2" (1.88 m)   Wt 78.9 kg (173 lb 15.1 oz)   SpO2 100%   BMI 22.33 kg/m²     Temp:  [97.6 °F (36.4 °C)-98.5 °F (36.9 °C)]   Pulse:  [55-81]   Resp:  [16-18]   BP: (116-153)/(61-77)   SpO2:  [98 %-100 %]     I & O (Last 24H):  Intake/Output Summary (Last 24 hours) at 6/2/2025 0823  Last data filed at 6/2/2025 0049  Gross per 24 hour   Intake 750 ml   Output 1250 ml   Net -500 ml       Physical Exam:  General appearance:  NAD this am.  Eyes:  Conjunctivae/corneas clear. PERRL.  Lungs: Normal respiratory effort,   clear to auscultation bilaterally   Heart: Regular rate and rhythm, S1, S2 normal, 3/6 murmur  Abdomen: Soft, nontender, ostomy + bowel sounds  Extremities: No cyanosis or clubbing.  trace+ edema.    Right knee less painful.   Skin: Skin color pale, texture, turgor normal. No rashes or lesions  Neurological:  nonfocal  RIJ ROBINSON.       Laboratory " "Data:  Recent Labs   Lab 06/02/25  0314   WBC 7.58   RBC 2.65*   HGB 8.1*   HCT 25.6*   *   MCV 97   MCH 30.6   MCHC 31.6*       BMP:   Recent Labs   Lab 06/02/25  0346   *   *   K 4.6      CO2 23   BUN 61*   CREATININE 4.0*   CALCIUM 7.5*   MG 1.7   PHOS 4.5     Lab Results   Component Value Date    CALCIUM 7.5 (L) 06/02/2025    PHOS 4.5 06/02/2025       Lab Results   Component Value Date    .4 (H) 05/16/2025    CALCIUM 7.5 (L) 06/02/2025    PHOS 4.5 06/02/2025       Lab Results   Component Value Date    URICACID 7.9 (H) 10/10/2024       BNP  No results for input(s): "BNP", "BNPTRIAGEBLO" in the last 168 hours.      Medications:  Medication list was reviewed and changes noted under Assessment/Plan.    Diagnostic Results:    X-Ray Knee 1 or 2 View Right   Final Result      Moderate suprapatellar joint effusion with no acute osseous abnormality seen.      Osteoarthritis with chondrocalcinosis.         Electronically signed by: Daysi Richard   Date:    05/30/2025   Time:    10:37      IR Tunneled Catheter Insert w/o Port   Final Result      Insertion of right -sided supradiaphragmatic dual- lumen tunneled hemodialysis catheter, with tip in the expected location of the cavoatrial junction.      Plan:      The catheter may be used immediately.      _______________________________________________________________         Electronically signed by: Jamie Stokes MD   Date:    05/27/2025   Time:    13:24      X-Ray Cervical Spine 5 View W Flex Extxt   Final Result      Chronic change as above.         Electronically signed by: Kurt Bustillo MD   Date:    05/27/2025   Time:    07:46      X-Ray Hip 2 or 3 views Left with Pelvis when performed   Final Result      Please see above.         Electronically signed by: Derrek Vega   Date:    05/26/2025   Time:    14:01      US Carotid Bilateral   Final Result      No evidence of a hemodynamically significant carotid bifurcation stenosis.       "   Electronically signed by: Derrek Veag   Date:    05/26/2025   Time:    11:23      MRI Soft Tissue Neck Without Contrast   Final Result      No soft tissue mass or lymphadenopathy throughout the soft tissues of the neck.         Electronically signed by: Derrek Vega   Date:    05/26/2025   Time:    13:11      MRI Cervical Spine Without Contrast   Final Result      Severe degenerative changes as described producing severe foraminal and central canal stenosis from C2 through C5 and severe left foraminal stenosis at C5-6.         Electronically signed by: Jonathan Hill Jr   Date:    05/26/2025   Time:    12:16      MRA Brain without contrast   Final Result      MR angiogram of the head appears within normal limits.         Electronically signed by: Derrek Vega   Date:    05/26/2025   Time:    10:27      MRI Brain Without Contrast   Final Result   Abnormal      Punctate focus diffusion restriction about the left frontal subcortical white matter, in keeping with a small acute infarct.  No significant surrounding edema or mass effect.  No evidence for acute intracranial hemorrhage.      Sequela of microvascular ischemic change.      This report was flagged in Epic as abnormal.         Electronically signed by: Derrek Vega   Date:    05/26/2025   Time:    10:16      US Lower Extremity Veins Left   Final Result      No evidence of deep venous thrombosis in the left lower extremity.         Electronically signed by: Derrek Vega   Date:    05/26/2025   Time:    08:23      X-Ray Chest AP Portable   Final Result      Bibasilar atelectasis with chronic prominence of the pulmonary vascular markings.         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  Now has transitioned to  ESRD:  -followed closely by [facility] [place] [place]  -recently DC'd with Creat 6.3/BUN 79  -referred to vascular and transplant.   -hold diuretics  -Creat 7.8/BUN 90  -was on eliquis but held with planned ROBINSON.  Hep drip off  -bicarb tabs for worsening AGMA (CKD/GI losses) but stop with HD  -5/27:  Ext discussion with pt about RRT options vs Palliative.  Wants to do HD in [place].  Salvaging left arm for AVF/AVG next month.  Consulted Dr. Middleton for ROBINSON today but IR had to place.  Start RRT, labs, and consult SW to start HD as outpt.   -5/28:  continue with gentle HD X 3 treatments and then transition to 3/week.  Monitored ROBINSON site as may need stitch but doing well  -5/29:  HD #3 today and then TTS.  Await outpt HD arrangements in [place].    -5/30:  transitioned to TTS.    5/31: Seen on HD. Awaiting outpt HD placement   6/1: tolerated HD yesterday . Awaiting outpt chair still.  -6/2:  maintain TTS for now.   -see orders.  -Renally dose meds, avoid nephrotoxins, and monitor I/O's closely.      Acute CVA:  -MRI noted.  -defer to neurology.      Spinal Stenosis:  -on decadron.   -defer     Multifactorial anemia:  -RODRIGUE cleared by neurology  -recent IV iron  -On Epo IV 10k TTS  -transfuse if needed.    Thrombocytopenia  -Mild.  -defer    MBD:  -nephrocaps/binders  -PTH/ Phos at goal for CKD     Hypotension> HTN:  -Changed midodrine to 5 mg q 12hr and prn with dialysis  -BP in normal range     LLE DVT:  -on eliquis but held with bleeding.   -resumed      PAF and severe PA-HTN:  -defer     UC w/ ileostomy:  -not a candidate for PD     Right knee pain:  -s/p drain by Ortho  -not gout  -defer.      DISPO:     Multiple comorbidities  DNR        Thanks for consult  See above  Will follow along.        High MDM complexity necessary to treat or prevent imminent or life-threatening deterioration of the following conditions: ESRD  Time spent personally by me on the following activities 50 min: development of treatment plan with patient or  surrogate, discussions with consultants, interpretation of cardiac output measurements, examination of patient, ordering and performing treatments and interventions, evaluation of patient's response to treatment, obtaining history from patient or surrogate, ordering and review of laboratory studies, ordering and review of radiographic studies, re-evaluation of patient's condition, pulse oximetry and blood draw for specimens

## 2025-06-02 NOTE — SUBJECTIVE & OBJECTIVE
Interval History: Right knee improved.  Back/neck pain doing well.    Review of Systems   Constitutional:  Negative for chills and fever.   Respiratory:  Negative for shortness of breath.    Cardiovascular:  Negative for chest pain.   Gastrointestinal:  Negative for abdominal pain, constipation, diarrhea, nausea and vomiting.   Musculoskeletal:  Positive for arthralgias and back pain.     Objective:     Vital Signs (Most Recent):  Temp: 97.7 °F (36.5 °C) (06/02/25 1215)  Pulse: 61 (06/02/25 1215)  Resp: 17 (06/02/25 1215)  BP: (!) 142/66 (06/02/25 1215)  SpO2: 99 % (06/02/25 1215) Vital Signs (24h Range):  Temp:  [97.6 °F (36.4 °C)-98.5 °F (36.9 °C)] 97.7 °F (36.5 °C)  Pulse:  [55-81] 61  Resp:  [16-18] 17  SpO2:  [98 %-100 %] 99 %  BP: (119-153)/(65-77) 142/66     Weight: 78.9 kg (173 lb 15.1 oz)  Body mass index is 22.33 kg/m².    Intake/Output Summary (Last 24 hours) at 6/2/2025 1246  Last data filed at 6/2/2025 0914  Gross per 24 hour   Intake 670 ml   Output 900 ml   Net -230 ml         Physical Exam  Constitutional:       Appearance: He is ill-appearing.   Cardiovascular:      Rate and Rhythm: Normal rate and regular rhythm.      Heart sounds: Normal heart sounds. No murmur heard.     No friction rub. No gallop.      Comments: Tunneled hemodialysis catheter to right chest wall, without bleeding.  Pulmonary:      Effort: Pulmonary effort is normal. No respiratory distress.      Breath sounds: Normal breath sounds. No wheezing or rales.   Abdominal:      General: Bowel sounds are normal. There is no distension.      Palpations: Abdomen is soft.      Tenderness: There is no abdominal tenderness.      Comments:  Colostomy with stool in bag.   Musculoskeletal:         General: Tenderness present. Normal range of motion.      Comments: Right knee improved   Skin:     General: Skin is warm and dry.      Coloration: Skin is not pale.      Findings: No erythema or rash.   Neurological:      Mental Status: He is alert  and oriented to person, place, and time.               Significant Labs: All pertinent labs within the past 24 hours have been reviewed.    Significant Imaging: I have reviewed all pertinent imaging results/findings within the past 24 hours.

## 2025-06-02 NOTE — PROGRESS NOTES
"[address] Medicine  Progress Note    Patient Name: Jarrett Charlton Jr.  MRN: [id number]  Patient Class: IP- Inpatient   Admission Date: 5/26/2025  Length of Stay: 6 days  Attending Physician: Darrell Cheema MD  Primary Care Provider: Olga Hampton MD        Subjective     Principal Problem:Cervical spinal stenosis        HPI:  As per Poli Damon MD:    "Mr. Charlton is a 75 year old man with autonomic dysfunction on midodrine, coronary artery disease, progressing CKDV, BPH prostate cancer s/p TURP, BPH, cervical disk disease (s/p spinal fusion in the 80s), ulcerative colitis (s/p colectomy and illiostomy, ventricular tachycardia / ?atrial fibrillation s/p ablation who presented for evaluation of severe pain in his right shoulder / neck and left groin.  History is obtained by patient and his daughter at bedside.  They note a fall several months ago with abrasion to his lumbar back and shins.  He also had a fall 3 days ago which he states occurred when he was turning and tripped over his shoes.  He didn't hit his head, but did fall onto his right hand/arm.  He had no severe pain at that time.  He states that yesterday he woke up feeling ok, but as the day progressed he developed severe left inner groin pain.  The pain has been progressively worse throughout the day and is exacerbated by walking and laying down.  He also reports developing severe pain in his neck starting at the posterior base of his cranium and radiating down to his right shoulder.  The pain is severe and also worse with laying flat.  He describes the neck/shoulder pain as throbbing and intense.  He reports baseline dyspnea on exertion which is stable.  He denies fevers, nausea, vomiting, diarrhea, diminished urine output, cough, increased leg swelling, diminished oral intake, palpitations, chest pain and abdominal pain.  In the ED he was treated with methocarbamol, morphine and oxycodone with moderate relief " "of his pain.      Of note, patient recently treated at [facility] for campylobacter gastroenteritis and enterobacter bacteremia (discharged 4/28).  He was also treated at [facility] for worsening renal failure and hyperkalemia and discharged 5/18 with referral to vascular surgery for AV graft placement.  His daughter reports it is planned to be placed in July.  Daughter also reports planned ERCP/EUS for pancreatic lesion in July and cardiac ablation planned for July 15th."    Overview/Hospital Course:  Patient is 75-year-old man with a non dysfunction on midodrine, coronary artery disease, history of arrhythmias including atrial fibrillation and ventricular tachycardia,  chronic kidney disease stage 5, ulcerative colitis status post remote colectomy with ileostomy, cervical spinal disease status post remote spinal fusion who presents with severe pain in the right shoulder and left groin along with evidence of worsening renal function.    Nephrology consult recommended initiating hemodialysis.  A tunneled dialysis catheter placed today.    Recent imaging showed evidence of small acute left frontal stroke likely unrelated to current presentation.  Evaluated by neurology recommended continuing anticoagulation.    Dr. Adan Santiago (Spine Orthopedic Surgery) evaluated patient and recommended high-dose steroids to treat spinal stenosis along with therapy.    Patient also underwent an arthrocentesis of his right knee.  Patient with 50,000 white cells with neutrophil predominance.  Gram stain negative.    Cultures submitted.    Interval History: Right knee improved.  Back/neck pain doing well.    Review of Systems   Constitutional:  Negative for chills and fever.   Respiratory:  Negative for shortness of breath.    Cardiovascular:  Negative for chest pain.   Gastrointestinal:  Negative for abdominal pain, constipation, diarrhea, nausea and vomiting.   Musculoskeletal:  Positive for arthralgias and back pain.     Objective: "     Vital Signs (Most Recent):  Temp: 97.7 °F (36.5 °C) (06/02/25 1215)  Pulse: 61 (06/02/25 1215)  Resp: 17 (06/02/25 1215)  BP: (!) 142/66 (06/02/25 1215)  SpO2: 99 % (06/02/25 1215) Vital Signs (24h Range):  Temp:  [97.6 °F (36.4 °C)-98.5 °F (36.9 °C)] 97.7 °F (36.5 °C)  Pulse:  [55-81] 61  Resp:  [16-18] 17  SpO2:  [98 %-100 %] 99 %  BP: (119-153)/(65-77) 142/66     Weight: 78.9 kg (173 lb 15.1 oz)  Body mass index is 22.33 kg/m².    Intake/Output Summary (Last 24 hours) at 6/2/2025 1246  Last data filed at 6/2/2025 0914  Gross per 24 hour   Intake 670 ml   Output 900 ml   Net -230 ml         Physical Exam  Constitutional:       Appearance: He is ill-appearing.   Cardiovascular:      Rate and Rhythm: Normal rate and regular rhythm.      Heart sounds: Normal heart sounds. No murmur heard.     No friction rub. No gallop.      Comments: Tunneled hemodialysis catheter to right chest wall, without bleeding.  Pulmonary:      Effort: Pulmonary effort is normal. No respiratory distress.      Breath sounds: Normal breath sounds. No wheezing or rales.   Abdominal:      General: Bowel sounds are normal. There is no distension.      Palpations: Abdomen is soft.      Tenderness: There is no abdominal tenderness.      Comments:  Colostomy with stool in bag.   Musculoskeletal:         General: Tenderness present. Normal range of motion.      Comments: Right knee improved   Skin:     General: Skin is warm and dry.      Coloration: Skin is not pale.      Findings: No erythema or rash.   Neurological:      Mental Status: He is alert and oriented to person, place, and time.               Significant Labs: All pertinent labs within the past 24 hours have been reviewed.    Significant Imaging: I have reviewed all pertinent imaging results/findings within the past 24 hours.      Assessment & Plan  Cervical spinal stenosis  Neck pain on right side  Effusion of right knee  Patient presents with significant worsening debility from  baseline. MRI C-spine shows severe degenerative changes producing severe foraminal and central canal stenosis from C2 through C5 and severe left foraminal stenosis at C5-6.  MRI soft tissue neck shows no soft tissue mass or lymphadenopathy throughout the soft tissues of the neck.     Dr. Adan Santiago (Spine Orthopedic Surgery) evaluated patient and recommended high-dose steroids to treat spinal stenosis along with therapy.    Patient also underwent an arthrocentesis of his right knee.  Patient with 50,000 white cells with neutrophil predominance.  Gram stain negative.   Dr. Merrill suspect infusion likely inflammatory and not likely a septic joint.   Cultures submitted and started on empiric antibiotic therapy pending culture results.  Culture remain negative.  Knee improving with anti-inflammatory medications.  Improved range of motion.  Discontinue further empiric antibiotics.  We will need close outpatient follow up.  History of deep venous thrombosis (DVT) of distal vein of left lower extremity  Doppler US of left leg without any evidence of DVT presently.    Anticoagulation held for tunneled line placement..  Bleeding from dialysis catheter resolved.  Restarted apixaban.    Falls  Debility  Autonomic dysfunction  Acute ischemic stroke  Orthostatic hypotension  -Noted at least 2 falls recently and saw Dr. Patel with neurology a few days ago.  His note was reviewed, noted concern for possible stroke and I ordered his suggested imaging workup.    -Suspect falls and debility are multifactorial.  -MRI brain notable for punctate focus diffusion restriction about the left frontal subcortical white matter, in keeping with a small acute infarct. No significant surrounding edema or mass effect. No evidence for acute intracranial hemorrhage.   -MRA brain was normal  Echocardiogram did not show thrombolytic source.  Evaluated by neurology recommended continuing anticoagulation.    Anticoagulation held for   hemodialysis line placement.  Restarted on apixaban.  Acute renal failure with acute tubular necrosis superimposed on stage 5 chronic kidney disease, not on chronic dialysis  Dialysis line placement and HD initiation this hospitalization.  Patient underwent dialysis yesterday.  Metabolic derangements improved. Awaiting outpatient dialysis chair secure prior to discharge.  Pending.  Acute on chronic combined systolic and diastolic congestive heart failure  Essential hypertension  CAD (coronary artery disease)  -On admit he denies any sob despite elevated BNP and peripheral edema.  Denies chest pain.  Troponin negative.  -Echo 2/25/25 showed EF 30%, indeterminate diastolic function, mild LA dilation, mild/moderate MR  -Strict ins/outs and daily weights with low sodium fluid restricted diet  -Continue statin, midodrine and metoprolol.  Restarted apixaban.  Atrial paroxysmal tachycardia  -Noted history - ?pAfib? And plans for ablation at [facility] [place] 7/15/25.  -Continue home amiodarone and metoprolol  Restarted apixaban  Thrombocytopenia  Mild, stable.  T2DM (type 2 diabetes mellitus)  -A1c 5.7  -Home regimen includes glimepiride - holding for now  -Sugars reasonably controlled  -Will treat with SSI ac/hs for now  History of ulcerative colitis  High output ileostomy  -Noted history  -Ostomy functioning as it should per patient.  Moderate protein-calorie malnutrition  -Nutrition consulted. Most recent weight and BMI monitored-   -Measurements:  Wt Readings from Last 1 Encounters:   06/01/25 78.9 kg (173 lb 15.1 oz)   Body mass index is 22.33 kg/m².  Gout  -Noted history  -Hold prior colchicine.    Hypothyroidism  -Continue home levothyroxine  Type 2 diabetes mellitus  Reasonably controlled with current regimen.  Will continue with current regimen and continue to monitor.  ACP (advance care planning)  -Advance Care Planning Date: 05/26/2025 I engaged patient and his daughter, at bedside, in discussion surrounding his goals of care  at the end of life.  We discussed the pros and cons of aggressive treatment in the face of cardiac arrest.  He wishes to continue with aggressive medical care, including dialysis and multiple upcoming procedures.  However, if he were to suffer a cardiac arrest he wishes to be allowed a peaceful death.  I explained that this is consistent with DNR and he was in agreement.  DNR order placed.  I spent 10 minutes ACP time today.  VTE Risk Mitigation (From admission, onward)           Ordered     heparin (porcine) injection 5,000 Units  As needed (PRN)         06/01/25 1503     apixaban tablet 2.5 mg  2 times daily         05/29/25 0930     heparin (porcine) injection 4,000 Units  As needed (PRN)         05/28/25 1306     Place sequential compression device  Until discontinued         05/26/25 0808     IP VTE HIGH RISK PATIENT  Once         05/26/25 0808     Place sequential compression device  Until discontinued         05/26/25 0546                      Darrell Cheema MD  Department of Hospital Medicine   [facility] ([address])

## 2025-06-02 NOTE — ASSESSMENT & PLAN NOTE
-Noted history - ?pAfib? And plans for ablation at [facility] 7/15/25.  -Continue home amiodarone and metoprolol  Restarted apixaban

## 2025-06-02 NOTE — PT/OT/SLP PROGRESS
"Physical Therapy Treatment    Patient Name:  Jarrett Charlton Jr.   MRN:  [id number]    Recommendations:     Discharge Recommendations: Low Intensity Therapy  Discharge Equipment Recommendations: none  Barriers to discharge: Decreased caregiver support (daughter and grandkids live with him, she works during day).    Assessment:     Jarrett Charlton Jr. is a 75 y.o. male admitted with a medical diagnosis of Cervical spinal stenosis.  He presents with the following impairments/functional limitations: weakness, impaired endurance, impaired self care skills, impaired functional mobility, gait instability, impaired balance, decreased coordination, decreased safety awareness, decreased lower extremity function, pain, impaired skin ;pt with good mobility today, amb'd in hallway w/ RW and SBA, NO LOB or SOB noted, pt needing some encouragement to stay out of bed during the day, adin w/ redness on sacral area and pt c/o pain.    Rehab Prognosis: Good; patient would benefit from acute skilled PT services to address these deficits and reach maximum level of function.    Recent Surgery: Procedure(s) (LRB):  Insertion,catheter,tunneled (N/A) 6 Days Post-Op    Plan:     During this hospitalization, patient to be seen 3 x/week to address the identified rehab impairments via gait training, therapeutic activities, therapeutic exercises, neuromuscular re-education and progress toward the following goals:    Plan of Care Expires:  06/28/25    Subjective     Chief Complaint: sacral pain mostly  Patient/Family Comments/goals: "I've already been up today" (w/ OT), pt finally agreeable to session and sitting up in chair w/ encouragement.  Pain/Comfort:  Pain Rating 1:  (did not rate)  Location - Side 1: Right  Location 1: knee  Pain Addressed 1: Reposition, Distraction, Nurse notified (has medication patch over R knee)  Pain Rating 2:  (did not rate)  Location 2: sacral spine  Pain Addressed 2: Reposition, Distraction, Nurse notified  Pain " Rating Post-Intervention 2:  (appeared more comfortable sitting up vs lying down on sacrum)      Objective:     Communicated with nurse prior to session.  Patient found HOB elevated with peripheral IV, telemetry, colostomy, central line upon PT entry to room. Pillows under bottom, pt trying to relieve sacral pain.    General Precautions: Standard, aspiration, diabetic, fall  Orthopedic Precautions: N/A  Braces: N/A  Respiratory Status: Room air     Functional Mobility:  Bed Mobility:     Supine to Sit: modified independence and supervision  Transfers:     Sit to Stand:  supervision and stand by assistance with rolling walker  Gait: amb'd ~150' w/ RW and SBA, step through gt pattern      AM-PAC 6 CLICK MOBILITY  Turning over in bed (including adjusting bedclothes, sheets and blankets)?: 4  Sitting down on and standing up from a chair with arms (e.g., wheelchair, bedside commode, etc.): 3  Moving from lying on back to sitting on the side of the bed?: 4  Moving to and from a bed to a chair (including a wheelchair)?: 3  Need to walk in hospital room?: 3  Climbing 3-5 steps with a railing?: 3  Basic Mobility Total Score: 20       Treatment & Education:  Pt educated on safety, to call nurse when needing to get up from chair.    Patient left up in chair with w/c cushion beneath him and  all lines intact, call button in reach, and nurse notified..    GOALS:   Multidisciplinary Problems       Physical Therapy Goals          Problem: Physical Therapy    Goal Priority Disciplines Outcome Interventions   Physical Therapy Goal     PT, PT/OT Progressing    Description: Goals to be met by: 25    Patient will increase functional independence with mobility by performin) Supine<>Sit with RW, Mod I  2) Sit <>Stand with RW, Mod I   3) Bed <>Chair with RW, Mod I   4) Pt to ambulate 150 feet with RW Mod I.                        DME Justifications:  No DME recommended requiring DME justifications    Time Tracking:     PT  Received On: 06/02/25  PT Start Time: 1410     PT Stop Time: 1425  PT Total Time (min): 15 min     Billable Minutes: Gait Training 15    Treatment Type: Treatment  PT/PTA: PTA     Number of PTA visits since last PT visit: 1 06/02/2025

## 2025-06-03 LAB
APTT PPP: 25.1 SECONDS (ref 21–32)
HOLD SPECIMEN: NORMAL
HOLD SPECIMEN: NORMAL
POCT GLUCOSE: 142 MG/DL (ref 70–110)
POCT GLUCOSE: 194 MG/DL (ref 70–110)
POCT GLUCOSE: 199 MG/DL (ref 70–110)

## 2025-06-03 PROCEDURE — 25000003 PHARM REV CODE 250: Mod: HCNC | Performed by: HOSPITALIST

## 2025-06-03 PROCEDURE — 97535 SELF CARE MNGMENT TRAINING: CPT | Mod: HCNC

## 2025-06-03 PROCEDURE — 63600175 PHARM REV CODE 636 W HCPCS: Mod: HCNC | Performed by: HOSPITALIST

## 2025-06-03 PROCEDURE — 94761 N-INVAS EAR/PLS OXIMETRY MLT: CPT | Mod: HCNC

## 2025-06-03 PROCEDURE — 92526 ORAL FUNCTION THERAPY: CPT | Mod: HCNC

## 2025-06-03 PROCEDURE — 85730 THROMBOPLASTIN TIME PARTIAL: CPT | Mod: HCNC | Performed by: HOSPITALIST

## 2025-06-03 PROCEDURE — 25000003 PHARM REV CODE 250: Mod: HCNC | Performed by: NURSE PRACTITIONER

## 2025-06-03 PROCEDURE — 63600175 PHARM REV CODE 636 W HCPCS: Mod: JZ,TB,HCNC | Performed by: NURSE PRACTITIONER

## 2025-06-03 PROCEDURE — 21400001 HC TELEMETRY ROOM: Mod: HCNC

## 2025-06-03 PROCEDURE — 36415 COLL VENOUS BLD VENIPUNCTURE: CPT | Mod: HCNC | Performed by: HOSPITALIST

## 2025-06-03 PROCEDURE — A4216 STERILE WATER/SALINE, 10 ML: HCPCS | Mod: HCNC | Performed by: HOSPITALIST

## 2025-06-03 RX ADMIN — TAMSULOSIN HYDROCHLORIDE 0.4 MG: 0.4 CAPSULE ORAL at 08:06

## 2025-06-03 RX ADMIN — AMIODARONE HYDROCHLORIDE 200 MG: 200 TABLET ORAL at 08:06

## 2025-06-03 RX ADMIN — SIMETHICONE 80 MG: 80 TABLET, CHEWABLE ORAL at 06:06

## 2025-06-03 RX ADMIN — Medication 10 ML: at 05:06

## 2025-06-03 RX ADMIN — LIDOCAINE 1 PATCH: 50 PATCH CUTANEOUS at 08:06

## 2025-06-03 RX ADMIN — PANTOPRAZOLE SODIUM 40 MG: 40 TABLET, DELAYED RELEASE ORAL at 08:06

## 2025-06-03 RX ADMIN — ASCORBIC ACID, THIAMINE MONONITRATE,RIBOFLAVIN, NIACINAMIDE, PYRIDOXINE HYDROCHLORIDE, FOLIC ACID, CYANOCOBALAMIN, BIOTIN, CALCIUM PANTOTHENATE, 1 CAPSULE: 100; 1.5; 1.7; 20; 10; 1; 6000; 150000; 5 CAPSULE, LIQUID FILLED ORAL at 08:06

## 2025-06-03 RX ADMIN — SEVELAMER CARBONATE 800 MG: 800 TABLET, FILM COATED ORAL at 01:06

## 2025-06-03 RX ADMIN — SEVELAMER CARBONATE 800 MG: 800 TABLET, FILM COATED ORAL at 08:06

## 2025-06-03 RX ADMIN — METHYLPREDNISOLONE 4 MG: 4 TABLET ORAL at 08:06

## 2025-06-03 RX ADMIN — BRIMONIDINE TARTRATE 1 DROP: 1.5 SOLUTION/ DROPS OPHTHALMIC at 08:06

## 2025-06-03 RX ADMIN — FERROUS SULFATE TAB 325 MG (65 MG ELEMENTAL FE) 1 EACH: 325 (65 FE) TAB at 08:06

## 2025-06-03 RX ADMIN — EPOETIN ALFA-EPBX 10000 UNITS: 10000 INJECTION, SOLUTION INTRAVENOUS; SUBCUTANEOUS at 08:06

## 2025-06-03 RX ADMIN — SEVELAMER CARBONATE 800 MG: 800 TABLET, FILM COATED ORAL at 06:06

## 2025-06-03 RX ADMIN — Medication 10 ML: at 02:06

## 2025-06-03 RX ADMIN — MUPIROCIN: 20 OINTMENT TOPICAL at 09:06

## 2025-06-03 RX ADMIN — BRIMONIDINE TARTRATE 1 DROP: 1.5 SOLUTION/ DROPS OPHTHALMIC at 09:06

## 2025-06-03 RX ADMIN — APIXABAN 2.5 MG: 2.5 TABLET, FILM COATED ORAL at 08:06

## 2025-06-03 RX ADMIN — METHYLPREDNISOLONE 4 MG: 4 TABLET ORAL at 09:06

## 2025-06-03 RX ADMIN — INSULIN ASPART 2 UNITS: 100 INJECTION, SOLUTION INTRAVENOUS; SUBCUTANEOUS at 06:06

## 2025-06-03 RX ADMIN — Medication 10 ML: at 09:06

## 2025-06-03 RX ADMIN — METOPROLOL SUCCINATE 12.5 MG: 25 TABLET, EXTENDED RELEASE ORAL at 08:06

## 2025-06-03 RX ADMIN — PRAVASTATIN SODIUM 40 MG: 20 TABLET ORAL at 08:06

## 2025-06-03 RX ADMIN — APIXABAN 2.5 MG: 2.5 TABLET, FILM COATED ORAL at 09:06

## 2025-06-03 RX ADMIN — INSULIN ASPART 2 UNITS: 100 INJECTION, SOLUTION INTRAVENOUS; SUBCUTANEOUS at 09:06

## 2025-06-03 RX ADMIN — GABAPENTIN 200 MG: 100 CAPSULE ORAL at 09:06

## 2025-06-03 RX ADMIN — MUPIROCIN: 20 OINTMENT TOPICAL at 08:06

## 2025-06-03 RX ADMIN — LEVOTHYROXINE SODIUM 25 MCG: 25 TABLET ORAL at 05:06

## 2025-06-03 NOTE — ASSESSMENT & PLAN NOTE
-Patient presented with severe right neck/shoulder pain and with significant worsening debility from baseline.   -MRI C-spine showed severe degenerative changes producing severe foraminal and central canal stenosis from C2 through C5 and severe left foraminal stenosis at C5-6.    -MRI soft tissue neck shows no soft tissue mass or lymphadenopathy throughout the soft tissues of the neck.   -Dr. Adan Santiago (Spine Orthopedic Surgery) evaluated patient and recommended high-dose steroids x3 days to treat spinal stenosis along with therapy.  He completed the high dose iv steroids and pain dramatically improved.  Completing medrol taper now.   -Also noted right knee effusion which was tapped by Dr. Santiago.  Fluid showed 50,000 wbc with PMN predominance and negative gram stain.  Effusion felt secondary to inflammation and not infection.  Cultures were negative and antibiotics discontinued.  -PT/OT consulted and recommend home health at discharge.  Will need follow-up with Dr. Santiago after discharge  -Medically stable for discharge now pending HD setup.

## 2025-06-03 NOTE — ASSESSMENT & PLAN NOTE
-Noted history - ?pAfib? and [place] [facility] [place].  -Continue home amiodarone and metoprolol  -Cotninue apixaban

## 2025-06-03 NOTE — PLAN OF CARE
Patient is Aaox4, care plan reviewed with patient.       Problem: Adult Inpatient Plan of Care  Goal: Plan of Care Review  Outcome: Progressing  Goal: Patient-Specific Goal (Individualized)  Outcome: Progressing  Goal: Absence of Hospital-Acquired Illness or Injury  Outcome: Progressing  Goal: Optimal Comfort and Wellbeing  Outcome: Progressing  Goal: Readiness for Transition of Care  Outcome: Progressing     Problem: Diabetes Comorbidity  Goal: Blood Glucose Level Within Targeted Range  Outcome: Progressing     Problem: Sepsis/Septic Shock  Goal: Optimal Coping  Outcome: Progressing  Goal: Absence of Bleeding  Outcome: Progressing  Goal: Blood Glucose Level Within Targeted Range  Outcome: Progressing  Goal: Absence of Infection Signs and Symptoms  Outcome: Progressing  Goal: Optimal Nutrition Intake  Outcome: Progressing

## 2025-06-03 NOTE — PLAN OF CARE
Problem: Adult Inpatient Plan of Care  Goal: Plan of Care Review  Outcome: Progressing  Flowsheets (Taken 6/3/2025 0404)  Plan of Care Reviewed With: patient     Problem: Fall Injury Risk  Goal: Absence of Fall and Fall-Related Injury  Outcome: Progressing  Intervention: Promote Injury-Free Environment  Flowsheets (Taken 6/3/2025 0404)  Safety Promotion/Fall Prevention: bed alarm set     Problem: Diabetes Comorbidity  Goal: Blood Glucose Level Within Targeted Range  Intervention: Monitor and Manage Glycemia  Flowsheets (Taken 6/3/2025 0404)  Glycemic Management: blood glucose monitored

## 2025-06-03 NOTE — PROGRESS NOTES
"Nephrology  Progress Note    Admit Date: 5/26/2025   LOS: 7 days     SUBJECTIVE:     Follow-up For:  GRADY    History of Present Illness:  Patient is a 75 y.o. male presents with neck/shoulder pain.  W/up in process and preliminarily MRI with acute left frontal infarct.  Labs significant for worsening GRADY/CKD (Creat 7.8, BUN 94, BNP 1253).  Pt with extensive med hx as outlined below and known to me from recent admission.  Significant renal disease and followed by [facility].  Has had rapid progression of CKD over past year with creat steadily rising 3-4-5-6-7.  Has been referred to vascular for AVF and transplant.  Seen after multiple testings.  Looks older than stated age,  frail, etc.  C/o pain in neck, head.       EPIC reviewed.  Case discussed with team.          Interval History:         No issues.  Routine HD today and awaiting outpt HD arrangements.       No CP/SOB/N/V/D/F/C.    OBJECTIVE:     Vital Signs Range (Last 24H):  BP (!) 152/68   Pulse (!) 59   Temp 98 °F (36.7 °C) (Oral)   Resp 18   Ht 6' 2" (1.88 m)   Wt 78.9 kg (173 lb 15.1 oz)   SpO2 99%   BMI 22.33 kg/m²     Temp:  [97.6 °F (36.4 °C)-98.7 °F (37.1 °C)]   Pulse:  [59-87]   Resp:  [17-18]   BP: (100-155)/(57-72)   SpO2:  [96 %-100 %]     I & O (Last 24H):  Intake/Output Summary (Last 24 hours) at 6/3/2025 0839  Last data filed at 6/3/2025 0349  Gross per 24 hour   Intake 770 ml   Output 1000 ml   Net -230 ml       Physical Exam:  General appearance:  NAD this am.  Eyes:  Conjunctivae/corneas clear. PERRL.  Lungs: Normal respiratory effort,   clear to auscultation bilaterally   Heart: Regular rate and rhythm, S1, S2 normal, 3/6 murmur  Abdomen: Soft, nontender, ostomy + bowel sounds  Extremities: No cyanosis or clubbing.  trace edema.    Right knee less painful.   Skin: Skin color pale, texture, turgor normal. No rashes or lesions  Neurological:  nonfocal  RIJ ROBINSON.       Laboratory Data:  No results for input(s): "WBC", "RBC", "HGB", " ""HCT", "PLT", "MCV", "MCH", "MCHC" in the last 24 hours.      BMP:   Recent Labs   Lab 06/02/25  0346   *   *   K 4.6      CO2 23   BUN 61*   CREATININE 4.0*   CALCIUM 7.5*   MG 1.7   PHOS 4.5     Lab Results   Component Value Date    CALCIUM 7.5 (L) 06/02/2025    PHOS 4.5 06/02/2025       Lab Results   Component Value Date    .4 (H) 05/16/2025    CALCIUM 7.5 (L) 06/02/2025    PHOS 4.5 06/02/2025       Lab Results   Component Value Date    URICACID 7.9 (H) 10/10/2024       BNP  No results for input(s): "BNP", "BNPTRIAGEBLO" in the last 168 hours.      Medications:  Medication list was reviewed and changes noted under Assessment/Plan.    Diagnostic Results:    X-Ray Knee 1 or 2 View Right   Final Result      Moderate suprapatellar joint effusion with no acute osseous abnormality seen.      Osteoarthritis with chondrocalcinosis.         Electronically signed by: Daysi Richard   Date:    05/30/2025   Time:    10:37      IR Tunneled Catheter Insert w/o Port   Final Result      Insertion of right -sided supradiaphragmatic dual- lumen tunneled hemodialysis catheter, with tip in the expected location of the cavoatrial junction.      Plan:      The catheter may be used immediately.      _______________________________________________________________         Electronically signed by: Jamie Stokes MD   Date:    05/27/2025   Time:    13:24      X-Ray Cervical Spine 5 View W Flex Extxt   Final Result      Chronic change as above.         Electronically signed by: Kurt Bustillo MD   Date:    05/27/2025   Time:    07:46      X-Ray Hip 2 or 3 views Left with Pelvis when performed   Final Result      Please see above.         Electronically signed by: Derrek Vega   Date:    05/26/2025   Time:    14:01      US Carotid Bilateral   Final Result      No evidence of a hemodynamically significant carotid bifurcation stenosis.         Electronically signed by: Derrek Vega   Date:    05/26/2025 "   Time:    11:23      MRI Soft Tissue Neck Without Contrast   Final Result      No soft tissue mass or lymphadenopathy throughout the soft tissues of the neck.         Electronically signed by: Derrek Vega   Date:    05/26/2025   Time:    13:11      MRI Cervical Spine Without Contrast   Final Result      Severe degenerative changes as described producing severe foraminal and central canal stenosis from C2 through C5 and severe left foraminal stenosis at C5-6.         Electronically signed by: Jonathan Hill Jr   Date:    05/26/2025   Time:    12:16      MRA Brain without contrast   Final Result      MR angiogram of the head appears within normal limits.         Electronically signed by: Derrek Vega   Date:    05/26/2025   Time:    10:27      MRI Brain Without Contrast   Final Result   Abnormal      Punctate focus diffusion restriction about the left frontal subcortical white matter, in keeping with a small acute infarct.  No significant surrounding edema or mass effect.  No evidence for acute intracranial hemorrhage.      Sequela of microvascular ischemic change.      This report was flagged in Epic as abnormal.         Electronically signed by: Derrek Vega   Date:    05/26/2025   Time:    10:16      US Lower Extremity Veins Left   Final Result      No evidence of deep venous thrombosis in the left lower extremity.         Electronically signed by: Derrek Vega   Date:    05/26/2025   Time:    08:23      X-Ray Chest AP Portable   Final Result      Bibasilar atelectasis with chronic prominence of the pulmonary vascular markings.         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  Now has transitioned to ESRD:  -followed closely by [facility]  -recently DC'd with Creat  6.3/BUN 79  -referred to vascular and transplant.   -hold diuretics  -Creat 7.8/BUN 90  -was on eliquis but held with planned ROBINSON.  Hep drip off  -bicarb tabs for worsening AGMA (CKD/GI losses) but stop with HD  -5/27:  Ext discussion with pt about RRT options vs Palliative.  Wants to do HD in [place].  Salvaging left arm for AVF/AVG next month.  Consulted Dr. Middleton for ROBINSON today but IR had to place.  Start RRT, labs, and consult SW to start HD as outpt.   -5/28:  continue with gentle HD X 3 treatments and then transition to 3/week.  Monitored ROBINSON site as may need stitch but doing well  -5/29:  HD #3 today and then TTS.  Await outpt HD arrangements in [place].    -5/30:  transitioned to TTS.    5/31: Seen on HD. Awaiting outpt HD placement   6/1: tolerated HD yesterday . Awaiting outpt chair still.  -6/2:  maintain TTS for now.   -6/3:  routine HD this am.   -see orders.  -Renally dose meds, avoid nephrotoxins, and monitor I/O's closely.      Acute CVA:  -MRI noted.  -defer to neurology.      Spinal Stenosis:  -on decadron.   -defer     Multifactorial anemia:  -RODRIGUE cleared by neurology  -recent IV iron  -On Epo IV 10k TTS  -transfuse if needed.    Thrombocytopenia  -Mild.  -defer    MBD:  -nephrocaps/binders  -PTH/ Phos at goal for CKD     Hypotension> HTN:  -Changed midodrine to 5 mg q 12hr and prn with dialysis  -BP in normal range     LLE DVT:  -on eliquis but held with bleeding.   -resumed      PAF and severe PA-HTN:  -defer     UC w/ ileostomy:  -not a candidate for PD     Right knee pain:  -s/p drain by Ortho  -not gout  -defer.      DISPO:     Multiple comorbidities  DNR        Thanks for consult  See above  Will follow along.        High MDM complexity necessary to treat or prevent imminent or life-threatening deterioration of the following conditions: ESRD  Time spent personally by me on the following activities 50 min: development of treatment plan with patient or surrogate, discussions with  consultants, interpretation of cardiac output measurements, examination of patient, ordering and performing treatments and interventions, evaluation of patient's response to treatment, obtaining history from patient or surrogate, ordering and review of laboratory studies, ordering and review of radiographic studies, re-evaluation of patient's condition, pulse oximetry and blood draw for specimens

## 2025-06-03 NOTE — PT/OT/SLP PROGRESS
Occupational Therapy   Treatment    Name: Jarrett Charlton Jr.  MRN: [id number]  Admitting Diagnosis:  Cervical spinal stenosis  7 Days Post-Op    Recommendations:     Discharge Recommendations: Low Intensity Therapy  Discharge Equipment Recommendations:   (Transport wheelchair with cushion)  Barriers to discharge:   (Defer to MD)    Assessment:     Jarrett Charlton Jr. is a 75 y.o. male with a medical diagnosis of Cervical spinal stenosis.  He presents with the following performance deficits affecting function: weakness, impaired endurance, impaired self care skills, impaired functional mobility, gait instability, impaired balance, decreased coordination, decreased upper extremity function, decreased lower extremity function, impaired fine motor, pain, decreased ROM, orthopedic precautions, impaired cardiopulmonary response to activity, impaired joint extensibility.     Rehab Prognosis:  Fair/Good; patient would benefit from acute skilled OT services to address these deficits and reach maximum level of function.       Plan:     Patient to be seen 5 x/week to address the above listed problems via self-care/home management, therapeutic activities  Plan of Care Expires: 06/27/25  Plan of Care Reviewed with: patient    Subjective     Chief Complaint: People leaving the door open to his room.  Patient/Family Comments/goals: His daughter has not visited him since he has been in the hospital.   Pain/Comfort:  Pain Rating 1: 0/10    Objective:     Communicated with: JULITA Quijano prior to session.  Patient found up in chair with colostomy, peripheral IV, telemetry (Right subclavian hemodialysis catheter) upon OT entry to room.    General Precautions: Standard, aspiration, fall    Orthopedic Precautions:N/A  Braces: N/A  Respiratory Status: Room air     Occupational Performance:       Functional Mobility/ADLTransfers:  Sit to stand: Supervision  Chair to transport wheelchair transfer: Supervision  Functional Mobility:  Supervision with RW  Education and training on parts or transport wheelchair, correct orientation of wheelchair cushion (front vs back) when placed in wheelchair, foot rest height adjusted    Activities of Daily Living:  Written handout on sock aid for future reference      [facility] 6 Click ADL: 22    Treatment & Education:  Role of OT, POC, parts/management/safety of transport wheelchair and cushion, discharge recs    Patient left sitting edge of bed with all lines intact and call button in reach    GOALS:   Multidisciplinary Problems       Occupational Therapy Goals          Problem: Occupational Therapy    Goal Priority Disciplines Outcome Interventions   Occupational Therapy Goal     OT, PT/OT Progressing    Description: Goals to be met by: 6/21/25     Patient will increase functional independence with ADLs by performing:    UB dressing at Supervision level, including retrieval of clothing.   LB dressing at Supervision level, including retrieval of clothing.   Toileting at Supervision level.   Grooming standing at sink at Supervision level.   Toilet transfer at Supervision level.   Sponge bathing at Supervision  level.                          DME Justifications:  Jarrett has a mobility limitation that significantly impairs his ability to participate in one or more mobility related activities in the community.  The mobility limitation cannot be sufficiently resolved by the use of a cane of walker. The use of a transport wheelchair will significantly improved pt's ability to participate in in the community and the pt will use it on a regular basis outside the home.  Jarrett has expressed willingness to use a transport wheelchair outside the home.  He also has a caregiver who is available, willing and able to provide assistance with the wheelchair when needed.       Time Tracking:     OT Date of Treatment: 06/03/25  OT Start Time: 1631  OT Stop Time: 1700  OT Total Time (min): 29 min    Billable Minutes:Self Care/Home  Management 15    OT/CRISTI: OT          6/3/2025

## 2025-06-03 NOTE — PLAN OF CARE
Patient has been accepted at [facility] for a MWF schedule at 11:15am     [facility]  [address] [place] [address] [place].  Lois Chang [id number]     Office: [phone]  Fax: [phone]

## 2025-06-03 NOTE — ASSESSMENT & PLAN NOTE
-Advance Care Planning Date: 05/26/2025 I engaged patient and his daughter, at bedside, in discussion surrounding his goals of care at the end of life.  We discussed the pros and cons of aggressive treatment in the face of cardiac arrest.  He wishes to continue with aggressive medical care, including dialysis and multiple upcoming procedures.  However, if he were to suffer a cardiac arrest he wishes to be allowed a peaceful death.  I explained that this is consistent with DNR and he was in agreement.  DNR order placed.  I spent 10 minutes ACP time 5/26/25.

## 2025-06-03 NOTE — PROGRESS NOTES
"[address] Medicine  Progress Note    Patient Name: Jarrett Charlton Jr.  MRN: [id number]  Patient Class: IP- Inpatient   Admission Date: 5/26/2025  Length of Stay: 7 days  Attending Physician: Poli Damon MD  Primary Care Provider: Olga Hampton MD        Subjective     Principal Problem:Cervical spinal stenosis        HPI:  As per Poli Damon MD:    "Mr. Charlton is a 75 year old man with autonomic dysfunction on midodrine, coronary artery disease, progressing CKDV, BPH prostate cancer s/p TURP, BPH, cervical disk disease (s/p spinal fusion in the 80s), ulcerative colitis (s/p colectomy and illiostomy, ventricular tachycardia / ?atrial fibrillation s/p ablation who presented for evaluation of severe pain in his right shoulder / neck and left groin.  History is obtained by patient and his daughter at bedside.  They note a fall several months ago with abrasion to his lumbar back and shins.  He also had a fall 3 days ago which he states occurred when he was turning and tripped over his shoes.  He didn't hit his head, but did fall onto his right hand/arm.  He had no severe pain at that time.  He states that yesterday he woke up feeling ok, but as the day progressed he developed severe left inner groin pain.  The pain has been progressively worse throughout the day and is exacerbated by walking and laying down.  He also reports developing severe pain in his neck starting at the posterior base of his cranium and radiating down to his right shoulder.  The pain is severe and also worse with laying flat.  He describes the neck/shoulder pain as throbbing and intense.  He reports baseline dyspnea on exertion which is stable.  He denies fevers, nausea, vomiting, diarrhea, diminished urine output, cough, increased leg swelling, diminished oral intake, palpitations, chest pain and abdominal pain.  In the ED he was treated with methocarbamol, morphine and oxycodone with moderate relief of " "his pain.      Of note, patient recently treated at [facility] for campylobacter gastroenteritis and enterobacter bacteremia (discharged 4/28).  He was also treated at [facility] for worsening renal failure and hyperkalemia and discharged 5/18 with referral to vascular surgery for AV graft placement.  His daughter reports it is planned to be placed in July.  Daughter also reports planned ERCP/EUS for pancreatic lesion in July and cardiac ablation planned for July 15th."    Overview/Hospital Course:  Patient is 75-year-old man with a non dysfunction on midodrine, coronary artery disease, history of arrhythmias including atrial fibrillation and ventricular tachycardia,  chronic kidney disease stage 5, ulcerative colitis status post remote colectomy with ileostomy, cervical spinal disease status post remote spinal fusion who presents with severe pain in the right shoulder and left groin along with evidence of worsening renal function.    Nephrology consult recommended initiating hemodialysis.  A tunneled dialysis catheter placed today.    Recent imaging showed evidence of small acute left frontal stroke likely unrelated to current presentation.  Evaluated by neurology recommended continuing anticoagulation.    Dr. Adan Santiago (Spine Orthopedic Surgery) evaluated patient and recommended high-dose steroids to treat spinal stenosis along with therapy.    Patient also underwent an arthrocentesis of his right knee.  Patient with 50,000 white cells with neutrophil predominance.  Gram stain negative.    Cultures submitted.    Interval History: No acute events overnight.  States he is feeling ok, denies pain and sob, tolerating HD.  Appears dramatically improved since I last saw him on day of admit.  All questions answered and patient had no further complaints.    Objective:     Vital Signs (Most Recent):  Temp: 97.4 °F (36.3 °C) (06/03/25 0832)  Pulse: 64 (06/03/25 1108)  Resp: 18 (06/03/25 0746)  BP: (!) 152/68 (06/03/25 " 0746)  SpO2: 100 % (06/03/25 0900) Vital Signs (24h Range):  Temp:  [97.4 °F (36.3 °C)-98.7 °F (37.1 °C)] 97.4 °F (36.3 °C)  Pulse:  [59-87] 64  Resp:  [17-18] 18  SpO2:  [96 %-100 %] 100 %  BP: (100-155)/(57-72) 152/68     Weight: 78.9 kg (173 lb 15.1 oz)  Body mass index is 22.33 kg/m².    Intake/Output Summary (Last 24 hours) at 6/3/2025 1232  Last data filed at 6/3/2025 0349  Gross per 24 hour   Intake 490 ml   Output 800 ml   Net -310 ml         Physical Exam  Constitutional:       General: He is not in acute distress.     Appearance: He is not ill-appearing or toxic-appearing.   Cardiovascular:      Rate and Rhythm: Normal rate and regular rhythm.      Heart sounds: Normal heart sounds. No murmur heard.     No friction rub. No gallop.      Comments: Tunneled hemodialysis catheter to right chest wall, without bleeding.  Pulmonary:      Effort: Pulmonary effort is normal. No respiratory distress.      Breath sounds: Normal breath sounds. No wheezing or rales.   Abdominal:      General: Bowel sounds are normal. There is no distension.      Palpations: Abdomen is soft.      Tenderness: There is no abdominal tenderness.      Comments:  Colostomy with stool in bag.   Musculoskeletal:         General: Tenderness present. Normal range of motion.      Comments: Right knee improved   Skin:     General: Skin is warm and dry.      Coloration: Skin is not pale.      Findings: No erythema or rash.   Neurological:      Mental Status: He is alert and oriented to person, place, and time.           Significant Labs: All pertinent labs within the past 24 hours have been reviewed.    Significant Imaging: I have reviewed all pertinent imaging results/findings within the past 24 hours.      Assessment & Plan  Cervical spinal stenosis  Neck pain on right side  Effusion of right knee  -Patient presented with severe right neck/shoulder pain and with significant worsening debility from baseline.   -MRI C-spine showed severe  degenerative changes producing severe foraminal and central canal stenosis from C2 through C5 and severe left foraminal stenosis at C5-6.    -MRI soft tissue neck shows no soft tissue mass or lymphadenopathy throughout the soft tissues of the neck.   -Dr. Adan Santiago (Spine Orthopedic Surgery) evaluated patient and recommended high-dose steroids x3 days to treat spinal stenosis along with therapy.  He completed the high dose iv steroids and pain dramatically improved.  Completing medrol taper now.   -Also noted right knee effusion which was tapped by Dr. Santiago.  Fluid showed 50,000 wbc with PMN predominance and negative gram stain.  Effusion felt secondary to inflammation and not infection.  Cultures were negative and antibiotics discontinued.  -PT/OT consulted and recommend home health at discharge.  Will need follow-up with Dr. Santiago after discharge  -Medically stable for discharge now pending HD setup.  History of deep venous thrombosis (DVT) of distal vein of left lower extremity  D-oppler US of left leg without any evidence of DVT presently.      -Anticoagulation held for tunneled line placement..  Bleeding from dialysis catheter resolved.    -Restarted apixaban.    Falls  Debility  Autonomic dysfunction  Acute ischemic stroke  Orthostatic hypotension  -Noted at least 2 falls recently and saw Dr. Patel with neurology a few days prior to admit.  His note was reviewed, noted concern for possible stroke and I ordered his suggested imaging workup.    -Suspect falls and debility are multifactorial.  -MRI brain notable for punctate focus diffusion restriction about the left frontal subcortical white matter, in keeping with a small acute infarct. No significant surrounding edema or mass effect. No evidence for acute intracranial hemorrhage.   -MRA brain was normal  Echocardiogram did not show thrombolytic source.  -Evaluated by neurology who recommended continuing anticoagulation  alone.  Acute renal failure with acute tubular necrosis superimposed on stage 5 chronic kidney disease, not on chronic dialysis  -Baseline Cr earlier this year was 1.2-2.7 however in last 2 months has ranged from 4.4-7.1  -Seen by [facility] nephrology recently and referred for AV Graft placement - planned in July per his daughter  -On admit Cr 7.8, BUN 94 and CO2 19 - BNP elevated at 1,253 and he has peripheral edema. Reports having normal appetite and urine output.   -Avoid nephrotoxic agents and renally dose meds  -Consulted nephrology and input appreciated  -Required initiation of dialysis.  Tunneled HD line placed and HD has been initiated and tolerated  -Continue sevelamer  -Continue HD TTS - await outpatient HD setup.  Acute on chronic combined systolic and diastolic congestive heart failure  Essential hypertension  CAD (coronary artery disease)  -On admit he denied any sob despite elevated BNP and peripheral edema.  Denies chest pain.  Troponin negative.  -Echo 2/25/25 showed EF 30%, indeterminate diastolic function, mild LA dilation, mild/moderate MR  -Strict ins/outs and daily weights with low sodium fluid restricted diet  -Continue statin and metoprolol.    -previously on midodrine.  Atrial paroxysmal tachycardia  -Noted history - ?pAfib? and plans for ablation at [facility] 7/15/25.  -Continue home amiodarone and metoprolol  -Cotninue apixaban  Thrombocytopenia  -Has mild thrombocytopenia which he has had intermittently since at least 2019.  -No evidence of bleeding  -Repeat labs in AM  T2DM (type 2 diabetes mellitus)  -A1c 5.7  -Home regimen includes glimepiride - holding for now  -Sugars reasonably controlled  -Will treat with SSI ac/hs for now  History of ulcerative colitis  High output ileostomy  -Noted history  -Ostomy functioning as it should per patient.  Moderate protein-calorie malnutrition  -Nutrition consulted. Most recent weight and BMI monitored-   -Measurements:  Wt Readings from Last 1 Encounters:    06/03/25 78.9 kg (173 lb 15.1 oz)   Body mass index is 22.33 kg/m².  Gout  -Noted history  -Hold prior colchicine.    Hypothyroidism  -Continue levothyroxine  ACP (advance care planning)  -Advance Care Planning Date: 05/26/2025 I engaged patient and his daughter, at bedside, in discussion surrounding his goals of care at the end of life.  We discussed the pros and cons of aggressive treatment in the face of cardiac arrest.  He wishes to continue with aggressive medical care, including dialysis and multiple upcoming procedures.  However, if he were to suffer a cardiac arrest he wishes to be allowed a peaceful death.  I explained that this is consistent with DNR and he was in agreement.  DNR order placed.  I spent 10 minutes ACP time 5/26/25.  VTE Risk Mitigation (From admission, onward)           Ordered     heparin (porcine) injection 5,000 Units  As needed (PRN)         06/01/25 1503     apixaban tablet 2.5 mg  2 times daily         05/29/25 0930     heparin (porcine) injection 4,000 Units  As needed (PRN)         05/28/25 1306     Place sequential compression device  Until discontinued         05/26/25 0808     IP VTE HIGH RISK PATIENT  Once         05/26/25 0808     Place sequential compression device  Until discontinued         05/26/25 0546                    Discharge Planning   JOSÉ MIGUEL: 6/3/2025     Code Status: DNR   Medical Readiness for Discharge Date:   Discharge Plan A: Home with family                Please place Justification for DME        Poli Damon MD  Department of Hospital Medicine   [facility] ([address])

## 2025-06-03 NOTE — ASSESSMENT & PLAN NOTE
D-oppler US of left leg without any evidence of DVT presently.      -Anticoagulation held for tunneled line placement..  Bleeding from dialysis catheter resolved.    -Restarted apixaban.

## 2025-06-03 NOTE — PT/OT/SLP PROGRESS
"Speech Language Pathology Treatment    Patient Name:  Jarrett Charlton Jr.   MRN:  [id number]  Admitting Diagnosis: Cervical spinal stenosis    Recommendations:                 General Recommendations:  SLP to follow up for ongoing assessment of diet tolerance   Diet recommendations:  Regular Diet - IDDSI Level 7, Liquid Diet Level: Thin liquids - IDDSI Level 0   Aspiration Precautions:   1 bite/sip at a time  Alternating bites/sips  HOB to 90 degrees as tolerated  Small bites/sips  Standard aspiration precautions   General Precautions: Standard, aspiration, fall  Communication strategies:  go to room if call light pushed    Assessment:     Jarrett Charlton Jr. is a 75 y.o. male who presents with resolving dysphagia. At this time, SLP recommends a diet advancement to regular solids and thin liquids given implementation of safe swallow precautions. SLP will continue to follow.     Subjective     Pt awake/alert and agreeable to SLP session given some encouragement.     "What kind of torture do you have for me?" Re: SLP entering the room and introducing herself.    Pain/Comfort:  Pain Rating 1: 0/10  Pain Rating Post-Intervention 1: 0/10    Respiratory Status: Room air    Objective:     Has the patient been evaluated by SLP for swallowing?   Yes  Keep patient NPO? No     Cognitive Communication Status:   Pt awake, alert, and cooperative given min encouragement from the SLP.  SLP engaged pt in a conversation re: wants/needs, recent meals, and neck pain.   Pt displayed adequate initiation and turn-taking during conversation.   Pt displayed adequate length utterances during communication exchanges.   Adequate responses noted to open-ended questions.   Pt followed 1-step commands with 100% accy.   Motor speech appears intact. Pt was 100% intelligible at the conversation level with unfamiliar listeners.   Voice was clear with adequate vocal intensity   Pt may benefit from ongoing assessment of his cognitive-linguistic function. "     Swallowing:   Per chart review, medical team advanced pt to a regular/thin diet. Pt denies neck pain which was noted during initial evaluation. Pt endorsed adequate tolerance of regular solids and thin liquids with recent meals.     PO Trials:  Thin liquids: single sips via straw    Regular solids: 1 whole debbie cracker      Oral Phase:   Adequate labial seal around utensil with no evidence of anterior spillage  Able to siphon liquids from the straw  Functional mandibular depression and elevation for adequate initial biteforce of solid bolus  Adequate rotary chew pattern   Timely mastication of regular solids   Min lingual residue observed post swallow with regular solids though able to clear residue with a cued liquid wash      Pharyngeal Phase:   Trigger of pharyngeal swallow appears timely for intake of regular solids and thin liquids   No overt signs of airway threat noted with regular solids and thin liquids     Education: Education provided re: role of SLP, rationale for diet advancement, small bites/sips, alternate bites/sips, HOB elevated with meals, signs of airway threat, and POC.  Pt verbalized understanding and agreement with no questions following education. Pt with all needs met and no report of pain upon SLP exit.     Goals:   Multidisciplinary Problems       SLP Goals          Problem: SLP    Goal Priority Disciplines Outcome   SLP Goal     SLP Progressing   Description: Goals updated on 6/3:  ================================================  Speech Language Pathology Goals  Goals expected to be met by 6/10:    1. Pt will tolerate a regular/thin diet without displaying overt signs of airway threat.     ===============================================  Goals expected to be met by 6/5:  1. Pt will be able to consume thin liquids and minced and moist solids without overt s/s of airway threat or aspiration. -Met 6/3                         Plan:     Patient to be seen:  1 x/week, 2 x/week   Plan of  Care expires:  06/02/25  Plan of Care reviewed with:  patient   SLP Follow-Up:  Yes       Barriers to Discharge:  None    Time Tracking:     SLP Treatment Date:   06/03/25  Speech Start Time:  1503  Speech Stop Time:  1511     Speech Total Time (min):  8 min    Billable Minutes: Treatment Swallowing Dysfunction 8    06/03/2025

## 2025-06-03 NOTE — ASSESSMENT & PLAN NOTE
-Baseline Cr earlier this year was 1.2-2.7 however in last 2 months has ranged from 4.4-7.1  -Seen by [facility] nephrology recently and referred for AV Graft placement - planned in July per his daughter  -On admit Cr 7.8, BUN 94 and CO2 19 - BNP elevated at 1,253 and he has peripheral edema. Reports having normal appetite and urine output.   -Avoid nephrotoxic agents and renally dose meds  -Consulted nephrology and input appreciated  -Required initiation of dialysis.  Tunneled HD line placed and HD has been initiated and tolerated  -Continue sevelamer  -Continue HD TTS - await outpatient HD setup.

## 2025-06-03 NOTE — ASSESSMENT & PLAN NOTE
-Noted at least 2 falls recently and saw Dr. Patel with neurology a few days prior to admit.  His note was reviewed, noted concern for possible stroke and I ordered his suggested imaging workup.    -Suspect falls and debility are multifactorial.  -MRI brain notable for punctate focus diffusion restriction about the left frontal subcortical white matter, in keeping with a small acute infarct. No significant surrounding edema or mass effect. No evidence for acute intracranial hemorrhage.   -MRA brain was normal  Echocardiogram did not show thrombolytic source.  -Evaluated by neurology who recommended continuing anticoagulation alone.

## 2025-06-03 NOTE — ASSESSMENT & PLAN NOTE
-On admit he denied any sob despite elevated BNP and peripheral edema.  Denies chest pain.  Troponin negative.  -Echo 2/25/25 showed EF 30%, indeterminate diastolic function, mild LA dilation, mild/moderate MR  -Strict ins/outs and daily weights with low sodium fluid restricted diet  -Continue statin and metoprolol.    -previously on midodrine.

## 2025-06-03 NOTE — ASSESSMENT & PLAN NOTE
-Nutrition consulted. Most recent weight and BMI monitored-   -Measurements:  Wt Readings from Last 1 Encounters:   06/03/25 78.9 kg (173 lb 15.1 oz)   Body mass index is 22.33 kg/m².

## 2025-06-03 NOTE — SUBJECTIVE & OBJECTIVE
Interval History: No acute events overnight.  States he is feeling ok, denies pain and sob, tolerating HD.  Appears dramatically improved since I last saw him on day of admit.  All questions answered and patient had no further complaints.    Objective:     Vital Signs (Most Recent):  Temp: 97.4 °F (36.3 °C) (06/03/25 0832)  Pulse: 64 (06/03/25 1108)  Resp: 18 (06/03/25 0746)  BP: (!) 152/68 (06/03/25 0746)  SpO2: 100 % (06/03/25 0900) Vital Signs (24h Range):  Temp:  [97.4 °F (36.3 °C)-98.7 °F (37.1 °C)] 97.4 °F (36.3 °C)  Pulse:  [59-87] 64  Resp:  [17-18] 18  SpO2:  [96 %-100 %] 100 %  BP: (100-155)/(57-72) 152/68     Weight: 78.9 kg (173 lb 15.1 oz)  Body mass index is 22.33 kg/m².    Intake/Output Summary (Last 24 hours) at 6/3/2025 1232  Last data filed at 6/3/2025 0349  Gross per 24 hour   Intake 490 ml   Output 800 ml   Net -310 ml         Physical Exam  Constitutional:       General: He is not in acute distress.     Appearance: He is not ill-appearing or toxic-appearing.   Cardiovascular:      Rate and Rhythm: Normal rate and regular rhythm.      Heart sounds: Normal heart sounds. No murmur heard.     No friction rub. No gallop.      Comments: Tunneled hemodialysis catheter to right chest wall, without bleeding.  Pulmonary:      Effort: Pulmonary effort is normal. No respiratory distress.      Breath sounds: Normal breath sounds. No wheezing or rales.   Abdominal:      General: Bowel sounds are normal. There is no distension.      Palpations: Abdomen is soft.      Tenderness: There is no abdominal tenderness.      Comments:  Colostomy with stool in bag.   Musculoskeletal:         General: Tenderness present. Normal range of motion.      Comments: Right knee improved   Skin:     General: Skin is warm and dry.      Coloration: Skin is not pale.      Findings: No erythema or rash.   Neurological:      Mental Status: He is alert and oriented to person, place, and time.           Significant Labs: All pertinent  labs within the past 24 hours have been reviewed.    Significant Imaging: I have reviewed all pertinent imaging results/findings within the past 24 hours.

## 2025-06-04 VITALS
RESPIRATION RATE: 18 BRPM | BODY MASS INDEX: 22.66 KG/M2 | OXYGEN SATURATION: 100 % | DIASTOLIC BLOOD PRESSURE: 58 MMHG | WEIGHT: 176.56 LBS | SYSTOLIC BLOOD PRESSURE: 126 MMHG | HEART RATE: 66 BPM | HEIGHT: 74 IN | TEMPERATURE: 98 F

## 2025-06-04 LAB
POCT GLUCOSE: 121 MG/DL (ref 70–110)
POCT GLUCOSE: 142 MG/DL (ref 70–110)

## 2025-06-04 PROCEDURE — 25000003 PHARM REV CODE 250: Mod: HCNC | Performed by: HOSPITALIST

## 2025-06-04 PROCEDURE — 25000003 PHARM REV CODE 250: Mod: HCNC | Performed by: NURSE PRACTITIONER

## 2025-06-04 PROCEDURE — A4216 STERILE WATER/SALINE, 10 ML: HCPCS | Mod: HCNC | Performed by: HOSPITALIST

## 2025-06-04 PROCEDURE — 63600175 PHARM REV CODE 636 W HCPCS: Mod: HCNC | Performed by: HOSPITALIST

## 2025-06-04 RX ORDER — OXYCODONE HYDROCHLORIDE 5 MG/1
5 TABLET ORAL EVERY 8 HOURS PRN
Qty: 20 TABLET | Refills: 0 | Status: SHIPPED | OUTPATIENT
Start: 2025-06-04

## 2025-06-04 RX ORDER — SEVELAMER CARBONATE 800 MG/1
800 TABLET, FILM COATED ORAL
Qty: 90 TABLET | Refills: 1 | Status: SHIPPED | OUTPATIENT
Start: 2025-06-04 | End: 2025-06-04 | Stop reason: HOSPADM

## 2025-06-04 RX ORDER — LIDOCAINE 50 MG/G
1 PATCH TOPICAL DAILY
Qty: 30 PATCH | Refills: 1 | Status: SHIPPED | OUTPATIENT
Start: 2025-06-04

## 2025-06-04 RX ADMIN — Medication 10 ML: at 05:06

## 2025-06-04 RX ADMIN — AMIODARONE HYDROCHLORIDE 200 MG: 200 TABLET ORAL at 09:06

## 2025-06-04 RX ADMIN — SEVELAMER CARBONATE 800 MG: 800 TABLET, FILM COATED ORAL at 11:06

## 2025-06-04 RX ADMIN — PANTOPRAZOLE SODIUM 40 MG: 40 TABLET, DELAYED RELEASE ORAL at 09:06

## 2025-06-04 RX ADMIN — BRIMONIDINE TARTRATE 1 DROP: 1.5 SOLUTION/ DROPS OPHTHALMIC at 09:06

## 2025-06-04 RX ADMIN — ASCORBIC ACID, THIAMINE MONONITRATE,RIBOFLAVIN, NIACINAMIDE, PYRIDOXINE HYDROCHLORIDE, FOLIC ACID, CYANOCOBALAMIN, BIOTIN, CALCIUM PANTOTHENATE, 1 CAPSULE: 100; 1.5; 1.7; 20; 10; 1; 6000; 150000; 5 CAPSULE, LIQUID FILLED ORAL at 09:06

## 2025-06-04 RX ADMIN — PRAVASTATIN SODIUM 40 MG: 20 TABLET ORAL at 09:06

## 2025-06-04 RX ADMIN — APIXABAN 2.5 MG: 2.5 TABLET, FILM COATED ORAL at 09:06

## 2025-06-04 RX ADMIN — METHYLPREDNISOLONE 4 MG: 4 TABLET ORAL at 09:06

## 2025-06-04 RX ADMIN — SEVELAMER CARBONATE 800 MG: 800 TABLET, FILM COATED ORAL at 09:06

## 2025-06-04 RX ADMIN — FERROUS SULFATE TAB 325 MG (65 MG ELEMENTAL FE) 1 EACH: 325 (65 FE) TAB at 09:06

## 2025-06-04 RX ADMIN — TAMSULOSIN HYDROCHLORIDE 0.4 MG: 0.4 CAPSULE ORAL at 09:06

## 2025-06-04 RX ADMIN — LIDOCAINE 1 PATCH: 50 PATCH CUTANEOUS at 11:06

## 2025-06-04 RX ADMIN — METOPROLOL SUCCINATE 12.5 MG: 25 TABLET, EXTENDED RELEASE ORAL at 09:06

## 2025-06-04 RX ADMIN — MUPIROCIN: 20 OINTMENT TOPICAL at 09:06

## 2025-06-04 RX ADMIN — LEVOTHYROXINE SODIUM 25 MCG: 25 TABLET ORAL at 05:06

## 2025-06-04 NOTE — ASSESSMENT & PLAN NOTE
-On admit he denied any sob despite elevated BNP and peripheral edema.  Denies chest pain.  Troponin negative.  -Echo 2/25/25 showed EF 30%, indeterminate diastolic function, mild LA dilation, mild/moderate MR  -Strict ins/outs and daily weights with low sodium fluid restricted diet  -Continue statin and metoprolol.    -Previously on midodrine but not on presently.

## 2025-06-04 NOTE — PLAN OF CARE
Met with patient to review discharge recommendation of home health and is agreeable to plan    Patient/family provided list of facilities in-network with patient's payor plan. Providers that are owned, operated, or affiliated with [facility] are included on the list.     Notified that referral sent to below listed facilities from in-network list based on proximity to home/family support:   [facility]  2. [facility]  3. [facility]  4. [facility]    Patient/family instructed to identify preference.    Patient has declined to select a preferred provider and elects placement with the first accepting in network provider that is available to provide services as ordered by the physician       If an additional preferred facility not listed above is identified, additional referral to be sent. If above facilities unable to accept, will send additional referrals to in-network providers.       06/02/25 1000   Post-Acute Status   Post-Acute Authorization Home Health   Home Health Status Set-up Complete/Auth obtained   Patient choice form signed by patient/caregiver List with quality metrics by geographic area provided;List from CMS Compare;List from System Post-Acute Care

## 2025-06-04 NOTE — ASSESSMENT & PLAN NOTE
-Nutrition consulted. Most recent weight and BMI monitored-   -Measurements:  Wt Readings from Last 1 Encounters:   06/04/25 80.1 kg (176 lb 9.4 oz)   Body mass index is 22.67 kg/m².

## 2025-06-04 NOTE — PLAN OF CARE
Case Management Final Discharge Note    Discharge Disposition: home with home health ([facility])    New DME ordered / company name: [employer]    Relevant SDOH / Transition of Care Barriers: decreased support, transportation    Person available to provide assistance at home when needed and their contact information: daughter    Scheduled followup appointment: [facility], Ortho and Nephrologist     Referrals placed: none    Transportation: daughter will transport home        Patient educated on discharge services and updated on DC plan. Bedside RN notified, patient clear to discharge from Case Management Perspective.    [facility] ([address])  Discharge Final Note    Primary Care Provider: Olga Hampton MD    Expected Discharge Date: 6/4/2025    Final Discharge Note (most recent)       Final Note - 06/04/25 1402          Final Note    Assessment Type Final Discharge Note (P)      Anticipated Discharge Disposition Home-Health Care Svc (P)    [facility]    Hospital Resources/Appts/Education Provided Provided patient/caregiver with written discharge plan information;Appointments scheduled and added to AVS (P)         Post-Acute Status    Home Health Status Set-up Complete/Auth obtained (P)      Discharge Delays Personal Transportation (P)                      Important Message from Medicare  Important Message from Medicare regarding Discharge Appeal Rights: Explained to patient/caregiver, Signed/date by patient/caregiver     Date IMM was signed: 06/02/25  Time IMM was signed: 1041     Follow-up providers       [facility]    [address] [facility] [place] [address]   Phone: [phone]       Next Steps: Go on 6/6/2025    Instructions: 11:15 am    Adna Santiago MD   Specialty: Orthopedic Surgery    [facility] [facility] [address]  [facility] [address]   Phone: [phone]       Next Steps: Go on 6/19/2025    Instructions: 1pm    Priya Mora MD    Specialty: Nephrology    [address] [facility]  [place] [address]   Phone: [phone]       Next Steps: Go on 6/11/2025    Instructions: 9:45 am              After-discharge care                Dialysis/Infusion       [facility]   Service: Dialysis    [address] [facility]  [place] [address]   Phone: [phone]                 Home Medical Care       [facility]   Service: Home Rehabilitation, Home Nursing    [address] [address]   Phone: [phone]

## 2025-06-04 NOTE — ASSESSMENT & PLAN NOTE
-A1c 5.7  -Home regimen includes glimepiride - holding for now  -Sugars reasonably controlled with Ssi  -Resume glimepiride at discharge

## 2025-06-04 NOTE — ASSESSMENT & PLAN NOTE
-Baseline Cr earlier this year was 1.2-2.7 however in last 2 months has ranged from 4.4-7.1  -Seen by [facility] nephrology recently and referred for AV Graft placement - planned in July per his daughter  -On admit Cr 7.8, BUN 94 and CO2 19 - BNP elevated at 1,253 and he has peripheral edema. Reports having normal appetite and urine output.   -Avoid nephrotoxic agents and renally dose meds  -Consulted nephrology and input appreciated  -Required initiation of dialysis.  Tunneled HD line placed and HD has been initiated and tolerated  -Continued sevelamer during his stay with good control.  Attempted to continue at discharge but not approved by insurance.  Discussed with nephrology and ok to discharge without binders at this time.  -Outpatient HD setup completed and first session will be on 6/6 - will be MW.    -Follow-up with nephrology in clinic within 2 weeks.

## 2025-06-04 NOTE — ASSESSMENT & PLAN NOTE
-Noted at least 2 falls recently and saw Dr. Patel with neurology a few days prior to admit.  His note was reviewed, noted concern for possible stroke and I ordered his suggested imaging workup.    -Suspect falls and debility are multifactorial.  -MRI brain notable for punctate focus diffusion restriction about the left frontal subcortical white matter, in keeping with a small acute infarct. No significant surrounding edema or mass effect. No evidence for acute intracranial hemorrhage.   -MRA brain was normal  -Echocardiogram did not show thrombolytic source.  -Evaluated by neurology who recommended continuing anticoagulation alone.  -Placed referral for outpatient follow-up with vascular neurology

## 2025-06-04 NOTE — DISCHARGE SUMMARY
"[facility] Surg [address] Medicine  Discharge Summary      Patient Name: Jarrett Charlton Jr.  MRN: [id number]  [facility]: [id number]  Patient Class: IP- Inpatient  Admission Date: 5/26/2025  Hospital Length of Stay: 8 days  Discharge Date and Time: No discharge date for patient encounter.  Attending Physician: Poli Damon MD   Discharging Provider: Poli Damon MD  Primary Care Provider: Olga Hampton MD    Primary Care Team: Networked reference to record PCT     HPI:   As per Poli Damon MD:    "Mr. Charlton is a 75 year old man with autonomic dysfunction on midodrine, coronary artery disease, progressing CKDV, BPH prostate cancer s/p TURP, BPH, cervical disk disease (s/p spinal fusion in the 80s), ulcerative colitis (s/p colectomy and illiostomy, ventricular tachycardia / ?atrial fibrillation s/p ablation who presented for evaluation of severe pain in his right shoulder / neck and left groin.  History is obtained by patient and his daughter at bedside.  They note a fall several months ago with abrasion to his lumbar back and shins.  He also had a fall 3 days ago which he states occurred when he was turning and tripped over his shoes.  He didn't hit his head, but did fall onto his right hand/arm.  He had no severe pain at that time.  He states that yesterday he woke up feeling ok, but as the day progressed he developed severe left inner groin pain.  The pain has been progressively worse throughout the day and is exacerbated by walking and laying down.  He also reports developing severe pain in his neck starting at the posterior base of his cranium and radiating down to his right shoulder.  The pain is severe and also worse with laying flat.  He describes the neck/shoulder pain as throbbing and intense.  He reports baseline dyspnea on exertion which is stable.  He denies fevers, nausea, vomiting, diarrhea, diminished urine output, cough, increased leg swelling, diminished oral intake, " "palpitations, chest pain and abdominal pain.  In the ED he was treated with methocarbamol, morphine and oxycodone with moderate relief of his pain.      Of note, patient recently treated at [facility] for campylobacter gastroenteritis and enterobacter bacteremia (discharged 4/28).  He was also treated at [facility] for worsening renal failure and hyperkalemia and discharged 5/18 with referral to vascular surgery for AV graft placement.  His daughter reports it is planned to be placed in July.  Daughter also reports planned ERCP/EUS for pancreatic lesion in July and cardiac ablation planned for July 15th."    Procedure(s) (LRB):  Insertion,catheter,tunneled (N/A)        Goals of Care Treatment Preferences:  Code Status: DNR      SDOH Screening:  The patient was screened for utility difficulties, food insecurity, transport difficulties, housing insecurity, and interpersonal safety and there were no concerns identified this admission.     Consults:   Consults (From admission, onward)          Status Ordering Provider     Inpatient consult to Interventional Radiology  Once        Provider:  Jamie Stokes MD    Completed MASHA ALVAREZ     Inpatient consult to Social Work/Case Management  Once        Provider:  (Not yet assigned)    Completed MASHA ALVAREZ     Inpatient consult to Orthopedic Surgery  Once        Provider:  Adan Santiago MD    Completed RIYA CHAU     Inpatient Consult to Neurology Services (General Neurology)  Once        Provider:  Juana Vega MD    Completed RIYA CHAU     Inpatient consult to Nephrology-[facility]  Once        Provider:  Waldemar Sanchez MD    Completed RIYA CHAU          Hospital Course By Problem:   Assessment & Plan  Cervical spinal stenosis  Neck pain on right side  Effusion of right knee  -Patient presented with severe right neck/shoulder pain and with significant worsening debility from baseline.   -MRI C-spine showed severe degenerative changes " producing severe foraminal and central canal stenosis from C2 through C5 and severe left foraminal stenosis at C5-6.    -MRI soft tissue neck shows no soft tissue mass or lymphadenopathy throughout the soft tissues of the neck.   -Dr. Adan Santiago (Spine Orthopedic Surgery) evaluated patient and recommended high-dose steroids x3 days to treat spinal stenosis along with therapy.  He completed the high dose iv steroids and pain dramatically improved.  Completing medrol taper now.   -Also noted right knee effusion which was tapped by Dr. Santiago.  Fluid showed 50,000 wbc with PMN predominance and negative gram stain.  Effusion felt secondary to inflammation and not infection.  Cultures were negative and antibiotics discontinued.  -PT/OT consulted and recommend home health at discharge.  Will need follow-up with Dr. Santiago after discharge  -Patient seen and examined and he is doing well.  Outpatient dialysis has been setup.  He is medically stable for discharge home with home health.  Needs follow up with pcp within 1 week, spinal orthopedic surgery within 2 weeks, vascular neurology within 1 month and nephrology within 2 weeks.  Home health ordered.  History of deep venous thrombosis (DVT) of distal vein of left lower extremity  D-oppler US of left leg without any evidence of DVT presently.      -Anticoagulation held for tunneled line placement..  Bleeding from dialysis catheter resolved.    -Continue renally dosed apixaban.    Falls  Debility  Autonomic dysfunction  Acute ischemic stroke  Orthostatic hypotension  -Noted at least 2 falls recently and saw Dr. Patel with neurology a few days prior to admit.  His note was reviewed, noted concern for possible stroke and I ordered his suggested imaging workup.    -Suspect falls and debility are multifactorial.  -MRI brain notable for punctate focus diffusion restriction about the left frontal subcortical white matter, in keeping with a small  acute infarct. No significant surrounding edema or mass effect. No evidence for acute intracranial hemorrhage.   -MRA brain was normal  -Echocardiogram did not show thrombolytic source.  -Evaluated by neurology who recommended continuing anticoagulation alone.  -Placed referral for outpatient follow-up with vascular neurology  Acute renal failure with acute tubular necrosis superimposed on stage 5 chronic kidney disease, not on chronic dialysis  -Baseline Cr earlier this year was 1.2-2.7 however in last 2 months has ranged from 4.4-7.1  -Seen by [facility] nephrology recently and referred for AV Graft placement - planned in July per his daughter  -On admit Cr 7.8, BUN 94 and CO2 19 - BNP elevated at 1,253 and he has peripheral edema. Reports having normal appetite and urine output.   -Avoid nephrotoxic agents and renally dose meds  -Consulted nephrology and input appreciated  -Required initiation of dialysis.  Tunneled HD line placed and HD has been initiated and tolerated  -Continued sevelamer during his stay with good control.  Attempted to continue at discharge but not approved by insurance.  Discussed with nephrology and ok to discharge without binders at this time.  -Outpatient HD setup completed and first session will be on 6/6 - will be MWF.    -Follow-up with nephrology in clinic within 2 weeks.  Acute on chronic combined systolic and diastolic congestive heart failure  Essential hypertension  CAD (coronary artery disease)  -On admit he denied any sob despite elevated BNP and peripheral edema.  Denies chest pain.  Troponin negative.  -Echo 2/25/25 showed EF 30%, indeterminate diastolic function, mild LA dilation, mild/moderate MR  -Strict ins/outs and daily weights with low sodium fluid restricted diet  -Continue statin and metoprolol.    -Previously on midodrine but not on presently.  Atrial paroxysmal tachycardia  -Noted history - ?pAfib? and plans for ablation at [facility] 7/15/25.  -Continue home amiodarone and  metoprolol  -Continue apixaban  Thrombocytopenia  -Has mild thrombocytopenia which he has had intermittently since at least 2019.  -No evidence of bleeding  T2DM (type 2 diabetes mellitus)  -A1c 5.7  -Home regimen includes glimepiride - holding for now  -Sugars reasonably controlled with Ssi  -Resume glimepiride at discharge  History of ulcerative colitis  High output ileostomy  -Noted history  -Ostomy functioning as it should per patient.  Moderate protein-calorie malnutrition  -Nutrition consulted. Most recent weight and BMI monitored-   -Measurements:  Wt Readings from Last 1 Encounters:   06/04/25 80.1 kg (176 lb 9.4 oz)   Body mass index is 22.67 kg/m².  Gout  -Noted history  -Hold prior colchicine.    Hypothyroidism  -Continue levothyroxine  ACP (advance care planning)  -Advance Care Planning Date: 05/26/2025 I engaged patient and his daughter, at bedside, in discussion surrounding his goals of care at the end of life.  We discussed the pros and cons of aggressive treatment in the face of cardiac arrest.  He wishes to continue with aggressive medical care, including dialysis and multiple upcoming procedures.  However, if he were to suffer a cardiac arrest he wishes to be allowed a peaceful death.  I explained that this is consistent with DNR and he was in agreement.  DNR order placed.  I spent 10 minutes ACP time 5/26/25.    Final Active Diagnoses:    Diagnosis Date Noted POA    PRINCIPAL PROBLEM:  Cervical spinal stenosis [M48.02] 05/26/2025 Yes    Neck pain on right side [M54.2] 05/26/2025 Yes    History of deep venous thrombosis (DVT) of distal vein of left lower extremity [Z86.718] 01/24/2025 Not Applicable    Falls [R29.6] 05/26/2025 Not Applicable    Debility [R53.81] 05/26/2025 Yes    Autonomic dysfunction [G90.9] 12/19/2024 Yes    Acute renal failure with acute tubular necrosis superimposed on stage 5 chronic kidney disease, not on chronic dialysis [N17.0, N18.5] 02/11/2021 Yes    Acute on chronic  "combined systolic and diastolic congestive heart failure [I50.43] 09/24/2024 Yes    Thrombocytopenia [D69.6] 01/28/2025 Yes    Effusion of right knee [M25.461] 05/27/2025 Yes    Acute ischemic stroke [I63.9] 05/26/2025 Yes    Hypothyroidism [E03.9] 05/26/2025 Yes    ACP (advance care planning) [Z71.89] 05/26/2025 Not Applicable    Atrial paroxysmal tachycardia [I47.19] 04/24/2025 Yes    Gout [M10.9] 04/03/2025 Yes    Moderate protein-calorie malnutrition [E44.0] 10/03/2024 Yes    Type 2 diabetes mellitus [E11.9] 09/30/2020 Yes    High output ileostomy [R19.8, Z93.2] 02/04/2019 Not Applicable    Orthostatic hypotension [I95.1] 12/06/2017 Yes    CAD (coronary artery disease) [I25.10] 11/10/2017 Yes    Essential hypertension [I10]  Yes    History of ulcerative colitis [Z87.19]  Not Applicable    T2DM (type 2 diabetes mellitus) [E11.9]  Yes      Problems Resolved During this Admission:       Discharged Condition: stable    Disposition: Home-Health Care [facility]    Follow Up:   Contact information for follow-up providers       [facility]. Go on 6/6/2025.    Why: 11:15 am  Contact information:  [address] [facility] [address]  [phone]             Adan Santiago MD. Go on 6/19/2025.    Specialty: Orthopedic Surgery  Why: 1pm  Contact information:  [address] [facility] [address]  [facility] [address]  [phone]                       Contact information for after-discharge care       Dialysis/Infusion       [facility] [place] .    Service: Dialysis  Contact information:  [address] [facility] [address]  [phone]                                 Patient Instructions:      WHEELCHAIR FOR HOME USE     Order Specific Question Answer Comments   Hours in W/C per day: 4    Type of Wheelchair: Standard transport   Size(Width): 18"(STD adult)    Leg Support: STD footrests    Lap Belt: Velcro    Accessories: Anti-tippers    Cushion: Basic    Reclining Back No  " "  Height: 6' 2" (1.88 m)    Weight: 78.9 kg (173 lb 15.1 oz)    Does patient have medical equipment at home? cane, straight    Does patient have medical equipment at home? wheelchair    Does patient have medical equipment at home? walker, rolling    Length of need (1-99 months): 99    Please check all that apply: Caregiver is capable and willing to operate wheelchair safely.    Please check all that apply: Patient mobility limitations cannot be sufficiently resolved by the use of other ambulatory therapies.      Ambulatory referral/consult to Orthopedics   Standing Status: Future   Referral Priority: Routine Referral Type: Consultation   Referred to Provider: VIMAL SAINI Requested Specialty: Orthopedic Surgery   Number of Visits Requested: 1     Ambulatory referral/consult to Vascular Neurology   Standing Status: Future   Referral Priority: Routine Referral Type: Consultation   Referral Reason: Specialty Services Required   Requested Specialty: Vascular Neurology   Number of Visits Requested: 1     Ambulatory referral/consult to Nephrology   Standing Status: Future   Referral Priority: Routine Referral Type: Consultation   Referral Reason: Specialty Services Required   Requested Specialty: Nephrology   Number of Visits Requested: 1     Diet Cardiac     Diet diabetic     Diet renal     Notify your health care provider if you experience any of the following:  increased confusion or weakness     Notify your health care provider if you experience any of the following:  persistent dizziness, light-headedness, or visual disturbances     Notify your health care provider if you experience any of the following:  worsening rash     Notify your health care provider if you experience any of the following:  severe persistent headache     Notify your health care provider if you experience any of the following:  difficulty breathing or increased cough     Notify your health care provider if you experience any of the " "following:  severe uncontrolled pain     Notify your health care provider if you experience any of the following:  persistent nausea and vomiting or diarrhea     Notify your health care provider if you experience any of the following:  temperature >100.4     Activity as tolerated       Significant Diagnostic Studies: Labs: BMP: No results for input(s): "GLU", "NA", "K", "CL", "CO2", "BUN", "CREATININE", "CALCIUM", "MG" in the last 48 hours., CMP No results for input(s): "NA", "K", "CL", "CO2", "GLU", "BUN", "CREATININE", "CALCIUM", "PROT", "ALBUMIN", "BILITOT", "ALKPHOS", "AST", "ALT", "ANIONGAP", "ESTGFRAFRICA", "EGFRNONAA" in the last 48 hours., CBC No results for input(s): "WBC", "HGB", "HCT", "PLT" in the last 48 hours., INR   Lab Results   Component Value Date    INR 1.3 (H) 05/27/2025    INR 1.2 05/26/2025    INR 1.1 03/01/2025    PROTIME 14.1 (H) 05/27/2025    PROTIME 13.0 (H) 05/26/2025   , Lipid Panel   Lab Results   Component Value Date    CHOL 109 (L) 05/27/2025    HDL 43 05/27/2025    LDLCALC 57.6 (L) 05/27/2025    TRIG 42 05/27/2025    CHOLHDL 39.4 05/27/2025   , Troponin No results for input(s): "TROPONINI" in the last 168 hours., and A1C:   Recent Labs   Lab 12/27/24  0959 02/25/25  0947 05/07/25  0837   HGBA1C 6.5* 6.6* 5.7*       Pending Diagnostic Studies:       None           Medications:  Reconciled Home Medications:      Medication List        START taking these medications      LIDOcaine 5 %  Commonly known as: LIDODERM  Place 1 patch onto the skin once daily. Remove & Discard patch within 12 hours or as directed by MD     oxyCODONE 5 MG immediate release tablet  Commonly known as: ROXICODONE  Take 1 tablet (5 mg total) by mouth every 8 (eight) hours as needed for Pain.     vitamin renal formula (B-complex-vitamin c-folic acid) 1 mg Cap  Commonly known as: NEPHROCAP  Take 1 capsule by mouth once daily.            CHANGE how you take these medications      apixaban 2.5 mg Tab  Commonly known as: " ELIQUIS  Take 1 tablet (2.5 mg total) by mouth 2 (two) times daily.  What changed:   medication strength  how much to take            CONTINUE taking these medications      ACCU-CHEK SOFTCLIX LANCETS Misc  Generic drug: lancets  TEST BLOOD SUGAR THREE TIMES DAILY     alcohol swabs Padm  Commonly known as: DROPSAFE ALCOHOL PREP PADS  Apply 1 each topically once daily.     amiodarone 200 MG Tab  Commonly known as: PACERONE  Take 1 tablet (200 mg total) by mouth once daily.     aspirin 81 MG EC tablet  Commonly known as: ECOTRIN  Take 81 mg by mouth once daily.     blood sugar diagnostic Strp  Commonly known as: ACCU-CHEK GUIDE TEST STRIPS  1 each by Other route 3 (three) times daily. Test Blood Sugar     blood-glucose meter Misc  Commonly known as: ACCU-CHEK GUIDE GLUCOSE METER  Accucheck BID     brimonidine 0.2% 0.2 % Drop  Commonly known as: ALPHAGAN  Place 1 drop into both eyes 2 (two) times a day.     ferrous sulfate 325 mg (65 mg iron) Tab tablet  Commonly known as: IRON  Take 1 tablet (325 mg total) by mouth every other day.     gabapentin 100 MG capsule  Commonly known as: NEURONTIN  Take 2 capsules (200 mg total) by mouth every evening.     glimepiride 4 MG tablet  Commonly known as: AMARYL  Take 1 tablet (4 mg total) by mouth daily with breakfast.     leuprolide acetate (6 month) 45 mg Sykt injection  Commonly known as: LUPRON  Inject 45 mg into the muscle every 6 (six) months. for 4 doses     levothyroxine 25 MCG tablet  Commonly known as: SYNTHROID  Take 1 tablet (25 mcg total) by mouth before breakfast.     metoprolol succinate 25 MG 24 hr tablet  Commonly known as: TOPROL-XL  Take 0.5 tablets (12.5 mg total) by mouth once daily.     pantoprazole 40 MG tablet  Commonly known as: PROTONIX  Take 40 mg by mouth once daily.     pravastatin 40 MG tablet  Commonly known as: PRAVACHOL  Take 1 tablet (40 mg total) by mouth once daily.     tamsulosin 0.4 mg Cap  Commonly known as: FLOMAX  Take 1 capsule (0.4 mg  total) by mouth once daily.            STOP taking these medications      allopurinoL 100 MG tablet  Commonly known as: ZYLOPRIM     colchicine 0.6 mg tablet  Commonly known as: COLCRYS     EYE VITAMIN AND MINERALS ORAL     magnesium oxide 400 mg (241.3 mg magnesium) tablet  Commonly known as: MAGOX     midodrine 10 MG tablet  Commonly known as: PROAMATINE     predniSONE 2.5 MG tablet  Commonly known as: DELTASONE     torsemide 20 MG Tab  Commonly known as: DEMADEX              Indwelling Lines/Drains at time of discharge:   Lines/Drains/Airways       Central Venous Catheter Line  Duration                  Hemodialysis Catheter 05/27/25 right subclavian 8 days              Drain  Duration                  Ileostomy 01/01/84 RUQ 72972 days                    Time spent on the discharge of patient: 35 minutes         Poli Damon MD  Department of Hospital Medicine  [facility] ([address])

## 2025-06-04 NOTE — ASSESSMENT & PLAN NOTE
-Noted history - ?pAfib? and plans for ablation at [facility] 7/15/25.  -Continue home amiodarone and metoprolol  -Continue apixaban

## 2025-06-04 NOTE — PLAN OF CARE
Problem: Adult Inpatient Plan of Care  Goal: Plan of Care Review  Outcome: Progressing  Flowsheets (Taken 6/4/2025 0440)  Plan of Care Reviewed With: patient

## 2025-06-04 NOTE — PROGRESS NOTES
"Nephrology  Progress Note    Admit Date: 5/26/2025   LOS: 8 days     SUBJECTIVE:     Follow-up For:  GRADY    History of Present Illness:  Patient is a 75 y.o. male presents with neck/shoulder pain.  W/up in process and preliminarily MRI with acute left frontal infarct.  Labs significant for worsening GRADY/CKD (Creat 7.8, BUN 94, BNP 1253).  Pt with extensive med hx as outlined below and known to me from recent admission.  Significant renal disease and followed by [facility].  Has had rapid progression of CKD over past year with creat steadily rising 3-4-5-6-7.  Has been referred to vascular for AVF and transplant.  Seen after multiple testings.  Looks older than stated age,  frail, etc.  C/o pain in neck, head.       EPIC reviewed.  Case discussed with team.          Interval History:         Doing well.  DC today and transitioning to MWF HD.  No c/o.  Discussed with team.        No CP/SOB/N/V/D/F/C.    OBJECTIVE:     Vital Signs Range (Last 24H):  /62   Pulse 69   Temp 98.3 °F (36.8 °C)   Resp 18   Ht 6' 2" (1.88 m)   Wt 80.1 kg (176 lb 9.4 oz)   SpO2 98%   BMI 22.67 kg/m²     Temp:  [97.3 °F (36.3 °C)-98.3 °F (36.8 °C)]   Pulse:  [62-95]   Resp:  [16-20]   BP: (112-166)/(53-76)   SpO2:  [93 %-100 %]     I & O (Last 24H):  Intake/Output Summary (Last 24 hours) at 6/4/2025 0922  Last data filed at 6/4/2025 0511  Gross per 24 hour   Intake 740 ml   Output 1650 ml   Net -910 ml       Physical Exam:  General appearance:  NAD this am.  Eyes:  Conjunctivae/corneas clear. PERRL.  Lungs: Normal respiratory effort,   clear to auscultation bilaterally   Heart: Regular rate and rhythm, S1, S2 normal, 3/6 murmur  Abdomen: Soft, nontender, ostomy + bowel sounds  Extremities: No cyanosis or clubbing.  trace edema.    Right knee less painful.   Skin: Skin color pale, texture, turgor normal. No rashes or lesions  Neurological:  nonfocal  RIJ ROBINSON.       Laboratory Data:  No results for input(s): "WBC", "RBC", " ""HGB", "HCT", "PLT", "MCV", "MCH", "MCHC" in the last 24 hours.      BMP:   Recent Labs   Lab 06/02/25  0346   *   *   K 4.6      CO2 23   BUN 61*   CREATININE 4.0*   CALCIUM 7.5*   MG 1.7   PHOS 4.5     Lab Results   Component Value Date    CALCIUM 7.5 (L) 06/02/2025    PHOS 4.5 06/02/2025       Lab Results   Component Value Date    .4 (H) 05/16/2025    CALCIUM 7.5 (L) 06/02/2025    PHOS 4.5 06/02/2025       Lab Results   Component Value Date    URICACID 7.9 (H) 10/10/2024       BNP  No results for input(s): "BNP", "BNPTRIAGEBLO" in the last 168 hours.      Medications:  Medication list was reviewed and changes noted under Assessment/Plan.    Diagnostic Results:    X-Ray Knee 1 or 2 View Right   Final Result      Moderate suprapatellar joint effusion with no acute osseous abnormality seen.      Osteoarthritis with chondrocalcinosis.         Electronically signed by: Daysi Richard   Date:    05/30/2025   Time:    10:37      IR Tunneled Catheter Insert w/o Port   Final Result      Insertion of right -sided supradiaphragmatic dual- lumen tunneled hemodialysis catheter, with tip in the expected location of the cavoatrial junction.      Plan:      The catheter may be used immediately.      _______________________________________________________________         Electronically signed by: Jamie Stokes MD   Date:    05/27/2025   Time:    13:24      X-Ray Cervical Spine 5 View W Flex Extxt   Final Result      Chronic change as above.         Electronically signed by: Kurt Bustillo MD   Date:    05/27/2025   Time:    07:46      X-Ray Hip 2 or 3 views Left with Pelvis when performed   Final Result      Please see above.         Electronically signed by: Derrek Vega   Date:    05/26/2025   Time:    14:01      US Carotid Bilateral   Final Result      No evidence of a hemodynamically significant carotid bifurcation stenosis.         Electronically signed by: Derrek Vega   Date:    05/26/2025 "   Time:    11:23      MRI Soft Tissue Neck Without Contrast   Final Result      No soft tissue mass or lymphadenopathy throughout the soft tissues of the neck.         Electronically signed by: Derrek Vega   Date:    05/26/2025   Time:    13:11      MRI Cervical Spine Without Contrast   Final Result      Severe degenerative changes as described producing severe foraminal and central canal stenosis from C2 through C5 and severe left foraminal stenosis at C5-6.         Electronically signed by: Jonathan Hill Jr   Date:    05/26/2025   Time:    12:16      MRA Brain without contrast   Final Result      MR angiogram of the head appears within normal limits.         Electronically signed by: Derrek Vega   Date:    05/26/2025   Time:    10:27      MRI Brain Without Contrast   Final Result   Abnormal      Punctate focus diffusion restriction about the left frontal subcortical white matter, in keeping with a small acute infarct.  No significant surrounding edema or mass effect.  No evidence for acute intracranial hemorrhage.      Sequela of microvascular ischemic change.      This report was flagged in Epic as abnormal.         Electronically signed by: Derrek Vega   Date:    05/26/2025   Time:    10:16      US Lower Extremity Veins Left   Final Result      No evidence of deep venous thrombosis in the left lower extremity.         Electronically signed by: Derrek Vega   Date:    05/26/2025   Time:    08:23      X-Ray Chest AP Portable   Final Result      Bibasilar atelectasis with chronic prominence of the pulmonary vascular markings.         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  Now has transitioned to ESRD:  -followed closely by [facility]  -recently DC'd with Creat  6.3/BUN 79  -referred to vascular and transplant.   -hold diuretics  -Creat 7.8/BUN 90  -was on eliquis but held with planned ROBINSON.  Hep drip off  -bicarb tabs for worsening AGMA (CKD/GI losses) but stop with HD  -5/27:  Ext discussion with pt about RRT options vs Palliative.  Wants to do HD in [place].  Salvaging left arm for AVF/AVG next month.  Consulted Dr. Middleton for ROBINSON today but IR had to place.  Start RRT, labs, and consult SW to start HD as outpt.   -5/28:  continue with gentle HD X 3 treatments and then transition to 3/week.  Monitored ROBINSON site as may need stitch but doing well  -5/29:  HD #3 today and then TTS.  Await outpt HD arrangements in [place].    -5/30:  transitioned to TTS.    5/31: Seen on HD. Awaiting outpt HD placement   6/1: tolerated HD yesterday . Awaiting outpt chair still.  -6/2:  maintain TTS for now.   -6/3:  routine HD this am.   -6/4:  Transitioning to MWF.  No need for HD today.  Can start Friday.    -see orders.  -Renally dose meds, avoid nephrotoxins, and monitor I/O's closely.      Acute CVA:  -MRI noted.  -defer to neurology.      Spinal Stenosis:  -on decadron.   -defer     Multifactorial anemia:  -RODRIGUE cleared by neurology  -recent IV iron  -On Epo IV 10k TTS  -transfuse if needed.    Thrombocytopenia  -Mild.  -defer    MBD:  -nephrocaps/binders  -PTH/ Phos at goal for CKD     Hypotension> HTN:  -Changed midodrine to 5 mg q 12hr and prn with dialysis  -BP in normal range     LLE DVT:  -on eliquis but held with bleeding.   -resumed      PAF and severe PA-HTN:  -defer     UC w/ ileostomy:  -not a candidate for PD     Right knee pain:  -s/p drain by Ortho  -not gout  -defer.      DISPO:     Multiple comorbidities  DNR        Thanks for consult  See above    OK to DC.        High MDM complexity necessary to treat or prevent imminent or life-threatening deterioration of the following conditions: ESRD  Time spent personally by me on the following activities 50 min: development of  treatment plan with patient or surrogate, discussions with consultants, interpretation of cardiac output measurements, examination of patient, ordering and performing treatments and interventions, evaluation of patient's response to treatment, obtaining history from patient or surrogate, ordering and review of laboratory studies, ordering and review of radiographic studies, re-evaluation of patient's condition, pulse oximetry and blood draw for specimens

## 2025-06-04 NOTE — PLAN OF CARE
[facility] - [facility] ([address])      HOME HEALTH ORDERS  FACE TO FACE ENCOUNTER    Patient Name: Jarrett Charlton Jr.  YOB: 1950    PCP: Olga Hampton MD   PCP Address: [place] [address] / [place] [address]  PCP Phone Number: [phone]  PCP Fax: [phone]    Encounter Date: 5/26/25    Admit to Home Health    Diagnoses:  Active Hospital Problems    Diagnosis  POA    *Cervical spinal stenosis [M48.02]  Yes    Neck pain on right side [M54.2]  Yes     Priority: 1 - High    History of deep venous thrombosis (DVT) of distal vein of left lower extremity [Z86.718]  Not Applicable     Priority: 2     Falls [R29.6]  Not Applicable     Priority: 3     Debility [R53.81]  Yes     Priority: 3     Autonomic dysfunction [G90.9]  Yes     Priority: 3     Acute renal failure with acute tubular necrosis superimposed on stage 5 chronic kidney disease, not on chronic dialysis [N17.0, N18.5]  Yes     Priority: 4      Avoid nephrotoxic drug and NSAIDs      Acute on chronic combined systolic and diastolic congestive heart failure [I50.43]  Yes     Priority: 5     Thrombocytopenia [D69.6]  Yes     Priority: 6     Effusion of right knee [M25.461]  Yes    Acute ischemic stroke [I63.9]  Yes    Hypothyroidism [E03.9]  Yes    ACP (advance care planning) [Z71.89]  Not Applicable    Atrial paroxysmal tachycardia [I47.19]  Yes    Gout [M10.9]  Yes     LLE gout pain - knee-colchicine started for pain relief as well as prednisone 20 mg for 3 days then wean.  Cont to monitor serum uric acid level.        Moderate protein-calorie malnutrition [E44.0]  Yes    Type 2 diabetes mellitus [E11.9]  Yes    High output ileostomy [R19.8, Z93.2]  Not Applicable    Orthostatic hypotension [I95.1]  Yes    CAD (coronary artery disease) [I25.10]  Yes    Essential hypertension [I10]  Yes    History of ulcerative colitis [Z87.19]  Not Applicable     s/p colectomy and ileostomy      T2DM (type 2 diabetes mellitus) [E11.9]  Yes       Resolved Hospital Problems   No resolved problems to display.       Follow Up Appointments:  Future Appointments   Date Time Provider Department Center   6/27/2025  8:45 AM PAIGE Bains III, MD [facility] VASCSUR [facility]   7/8/2025 10:05 AM LAB, [facility] LAB [facility]   7/15/2025  8:00 AM 3PREP, KIM HUNG [facility]   7/15/2025 10:00 AM INPATIENT, HUMBERTO [facility] ECHOSTR [facility]   7/18/2025 10:00 AM Zafar Rojas MD [facility] CARDIO [facility]       Allergies:  Review of patient's allergies indicates:   Allergen Reactions    Atorvastatin     Rosuvastatin        Medications: Review discharge medications with patient and family and provide education.    Current Medications[1]     Medication List        START taking these medications      LIDOcaine 5 %  Commonly known as: LIDODERM  Place 1 patch onto the skin once daily. Remove & Discard patch within 12 hours or as directed by MD     oxyCODONE 5 MG immediate release tablet  Commonly known as: ROXICODONE  Take 1 tablet (5 mg total) by mouth every 8 (eight) hours as needed for Pain.     vitamin renal formula (B-complex-vitamin c-folic acid) 1 mg Cap  Commonly known as: NEPHROCAP  Take 1 capsule by mouth once daily.            CHANGE how you take these medications      apixaban 2.5 mg Tab  Commonly known as: ELIQUIS  Take 1 tablet (2.5 mg total) by mouth 2 (two) times daily.  What changed:   medication strength  how much to take            CONTINUE taking these medications      ACCU-CHEK SOFTCLIX LANCETS Misc  Generic drug: lancets  TEST BLOOD SUGAR THREE TIMES DAILY     alcohol swabs Padm  Commonly known as: DROPSAFE ALCOHOL PREP PADS  Apply 1 each topically once daily.     amiodarone 200 MG Tab  Commonly known as: PACERONE  Take 1 tablet (200 mg total) by mouth once daily.     aspirin 81 MG EC tablet  Commonly known as: ECOTRIN  Take 81 mg by mouth once daily.     blood sugar diagnostic Strp  Commonly known as: ACCU-CHEK GUIDE TEST STRIPS  1 each by Other route 3  (three) times daily. Test Blood Sugar     blood-glucose meter Misc  Commonly known as: ACCU-CHEK GUIDE GLUCOSE METER  Accucheck BID     brimonidine 0.2% 0.2 % Drop  Commonly known as: ALPHAGAN  Place 1 drop into both eyes 2 (two) times a day.     ferrous sulfate 325 mg (65 mg iron) Tab tablet  Commonly known as: IRON  Take 1 tablet (325 mg total) by mouth every other day.     gabapentin 100 MG capsule  Commonly known as: NEURONTIN  Take 2 capsules (200 mg total) by mouth every evening.     glimepiride 4 MG tablet  Commonly known as: AMARYL  Take 1 tablet (4 mg total) by mouth daily with breakfast.     leuprolide acetate (6 month) 45 mg Sykt injection  Commonly known as: LUPRON  Inject 45 mg into the muscle every 6 (six) months. for 4 doses     levothyroxine 25 MCG tablet  Commonly known as: SYNTHROID  Take 1 tablet (25 mcg total) by mouth before breakfast.     metoprolol succinate 25 MG 24 hr tablet  Commonly known as: TOPROL-XL  Take 0.5 tablets (12.5 mg total) by mouth once daily.     pantoprazole 40 MG tablet  Commonly known as: PROTONIX  Take 40 mg by mouth once daily.     pravastatin 40 MG tablet  Commonly known as: PRAVACHOL  Take 1 tablet (40 mg total) by mouth once daily.     tamsulosin 0.4 mg Cap  Commonly known as: FLOMAX  Take 1 capsule (0.4 mg total) by mouth once daily.            STOP taking these medications      allopurinoL 100 MG tablet  Commonly known as: ZYLOPRIM     colchicine 0.6 mg tablet  Commonly known as: COLCRYS     EYE VITAMIN AND MINERALS ORAL     magnesium oxide 400 mg (241.3 mg magnesium) tablet  Commonly known as: MAGOX     midodrine 10 MG tablet  Commonly known as: PROAMATINE     predniSONE 2.5 MG tablet  Commonly known as: DELTASONE     torsemide 20 MG Tab  Commonly known as: DEMADEX                I have seen and examined this patient within the last 30 days. My clinical findings that support the need for the home health skilled services and home bound status are the  following:no   Weakness/numbness causing balance and gait disturbance due to Stroke and Weakness/Debility making it taxing to leave home.     Diet:   cardiac diet, diabetic diet 1800 calorie, renal diet, 2 gram sodium diet, and low potassium diet    Referrals/ Consults  Physical Therapy to evaluate and treat. Evaluate for home safety and equipment needs; Establish/upgrade home exercise program. Perform / instruct on therapeutic exercises, gait training, transfer training, and Range of Motion.  Occupational Therapy to evaluate and treat. Evaluate home environment for safety and equipment needs. Perform/Instruct on transfers, ADL training, ROM, and therapeutic exercises.    Activities:   ambulate in house with assistance    Nursing:   Agency to admit patient within 24 hours of hospital discharge unless specified on physician order or at patient request    SN to complete comprehensive assessment including routine vital signs. Instruct on disease process and s/s of complications to report to MD. Review/verify medication list sent home with the patient at time of discharge  and instruct patient/caregiver as needed. Frequency may be adjusted depending on start of care date.     Skilled nurse to perform up to 3 visits PRN for symptoms related to diagnosis    Notify MD if SBP > 160 or < 90; DBP > 90 or < 50; HR > 120 or < 50; Temp > 101; O2 < 88%; Other:       Ok to schedule additional visits based on staff availability and patient request on consecutive days within the home health episode.    When multiple disciplines ordered:    Start of Care occurs on Sunday - Wednesday schedule remaining discipline evaluations as ordered on separate consecutive days following the start of care.    Thursday SOC -schedule subsequent evaluations Friday and Monday the following week.     Friday - Saturday SOC - schedule subsequent discipline evaluations on consecutive days starting Monday of the following week.    For all post-discharge  communication and subsequent orders please contact patient's primary care physician.     Miscellaneous   Routine Skin for Bedridden Patients: Instruct patient/caregiver to apply moisture barrier cream to all skin folds and wet areas in perineal area daily and after baths and all bowel movements.  Diabetic Care:   SN to perform and educate Diabetic management with blood glucose monitoring:, Fingerstick blood sugar a.m. and p.m., and Report CBG < 60 or > 350 to physician.    Home Health Aide:  Nursing Twice weekly, Physical Therapy Three times weekly, Occupational Therapy Three times weekly, and Home Health Aide Twice weekly    I certify that this patient is confined to his home and needs intermittent skilled nursing care, physical therapy, and occupational therapy.               [1]   Current Facility-Administered Medications   Medication Dose Route Frequency Provider Last Rate Last Admin    0.9% NaCl infusion   Intravenous Once Brijesh Cast NP        acetaminophen tablet 650 mg  650 mg Oral Q6H PRN Poli Damon MD        amiodarone tablet 200 mg  200 mg Oral Daily Poli Damon MD   200 mg at 06/04/25 0913    apixaban tablet 2.5 mg  2.5 mg Oral BID Brijesh Cast NP   2.5 mg at 06/04/25 0913    brimonidine 0.15 % OPTH DROP ophthalmic solution 1 drop  1 drop Both Eyes BID Poli Damon MD   1 drop at 06/04/25 0914    calcium carbonate 200 mg calcium (500 mg) chewable tablet 500 mg  500 mg Oral BID PRN Darrell Cheema MD   500 mg at 05/30/25 1757    dextrose 50% injection 12.5 g  12.5 g Intravenous PRN Poli Damon MD        dextrose 50% injection 25 g  25 g Intravenous PRN Poli Damon MD        epoetin mirella-epbx injection 10,000 Units  10,000 Units Intravenous Every Tues, Thurs, Sat Brijesh Cast NP   10,000 Units at 06/03/25 0821    ferrous sulfate tablet 1 each  1 tablet Oral Daily Poli Damon MD   1 each at 06/04/25 0912    gabapentin capsule 200 mg  200 mg Oral QHS Nelson  Poli ULLOA MD   200 mg at 06/03/25 2130    glucagon (human recombinant) injection 1 mg  1 mg Intramuscular PRN Nelson, Poli ULLOA MD        glucose chewable tablet 16 g  16 g Oral PRN Nelson, Poli ULLOA MD        glucose chewable tablet 24 g  24 g Oral PRN Nelson, Poli ULLOA MD        heparin (porcine) injection 4,000 Units  4,000 Units Intravenous PRN Brijesh Cast NP   4,000 Units at 05/28/25 1310    insulin aspart U-100 pen 0-10 Units  0-10 Units Subcutaneous QID (AC + HS) PRN Marker, Poli ULLOA MD   2 Units at 06/03/25 2130    levothyroxine tablet 25 mcg  25 mcg Oral Before breakfast Marker, Poli ULLOA MD   25 mcg at 06/04/25 0542    LIDOcaine 5 % patch 1 patch  1 patch Transdermal Q24H Marker, Poli ULLOA MD   1 patch at 06/03/25 0820    loperamide capsule 2 mg  2 mg Oral QID PRN Nelson, Poli ULLOA MD        melatonin tablet 6 mg  6 mg Oral Nightly PRN FedericoGreta MD        metoprolol succinate (TOPROL-XL) 24 hr split tablet 12.5 mg  12.5 mg Oral Daily Marker, Poli ULLOA MD   12.5 mg at 06/04/25 0912    morphine injection 2 mg  2 mg Intravenous Q6H PRN Nelson, Poli ULLOA MD   2 mg at 05/30/25 1201    mupirocin 2 % ointment   Nasal BID Brijesh Cast NP   Given at 06/03/25 2131    naloxone 0.4 mg/mL injection 0.02 mg  0.02 mg Intravenous PRN Nelson, Poli ULLOA MD        ondansetron injection 4 mg  4 mg Intravenous Q4H PRN Poli Damon MD        oxyCODONE immediate release tablet 5 mg  5 mg Oral Q6H PRN Nelson, Poli ULLOA MD   5 mg at 05/31/25 1709    pantoprazole EC tablet 40 mg  40 mg Oral Daily Marker, Poli ULLOA MD   40 mg at 06/03/25 0820    pravastatin tablet 40 mg  40 mg Oral Daily Nelson, Poli ULLOA MD   40 mg at 06/04/25 0913    sevelamer carbonate tablet 800 mg  800 mg Oral TID  Brijesh Cast NP   800 mg at 06/04/25 0912    simethicone chewable tablet 80 mg  1 tablet Oral TID PRN Abhi Canela NP   80 mg at 06/03/25 1830    sodium chloride 0.9% bolus 250 mL 250 mL  250 mL Intravenous PRN Serene  NICKY Coley        sodium chloride 0.9% flush 10 mL  10 mL Intravenous PRN Greta Caballero MD   10 mL at 05/30/25 1659    sodium chloride 0.9% flush 10 mL  10 mL Intravenous Q8H Poli Damon MD   10 mL at 06/04/25 0543    sodium chloride 0.9% flush 10 mL  10 mL Intravenous PRN Poli Damon MD        tamsulosin 24 hr capsule 0.4 mg  0.4 mg Oral Daily Poli Damon MD   0.4 mg at 06/03/25 0820    vitamin renal formula (B-complex-vitamin c-folic acid) 1 mg per capsule 1 capsule  1 capsule Oral Daily Brijesh Cast NP   1 capsule at 06/04/25 0913     Facility-Administered Medications Ordered in Other Encounters   Medication Dose Route Frequency Provider Last Rate Last Admin    sodium chloride 0.9% in 1,000 mL infusion   Intravenous Continuous Order, Paper

## 2025-06-04 NOTE — PLAN OF CARE
Patient remains in the hospital due to dialysis chair set up with [facility], [facility].     [facility] - Med S[facility] ([address])  Discharge Reassessment    Primary Care Provider: Olga Hampton MD    Expected Discharge Date: 6/4/2025    Reassessment (most recent)       Discharge Reassessment - 06/04/25 0711          Discharge Reassessment    Assessment Type Discharge Planning Reassessment (P)      Did the patient's condition or plan change since previous assessment? No (P)      Discharge Plan discussed with: Patient;Adult children (P)      Communicated JOSÉ MIGUEL with patient/caregiver Date not available/Unable to determine (P)      Discharge Plan A Home Health (P)      Discharge Plan B Home with family (P)      DME Needed Upon Discharge  wheelchair (P)    order has been placed    Transition of Care Barriers None (P)      Why the patient remains in the hospital Placement issues (P)    HD Chair set up       Post-Acute Status    Post-Acute Authorization Home Health (P)      Home Health Status Referrals Sent (P)      Discharge Delays Dialysis Set-up (P)

## 2025-06-04 NOTE — ASSESSMENT & PLAN NOTE
D-oppler US of left leg without any evidence of DVT presently.      -Anticoagulation held for tunneled line placement..  Bleeding from dialysis catheter resolved.    -Continue renally dosed apixaban.

## 2025-06-04 NOTE — ASSESSMENT & PLAN NOTE
-Has mild thrombocytopenia which he has had intermittently since at least 2019.  -No evidence of bleeding

## 2025-06-04 NOTE — DISCHARGE INSTRUCTIONS
Take all medications as prescribed.  Eat a strict low salt cardiac and renal diet.  Follow up with your physicians as scheduled - pcp within 1 week.  Nephrology within 2 weeks.  We placed referrals to neurology and spinal orthopedic surgery and ordered a home health service  Continue dialysis as scheduled - sessions will be on Monday, Wednesday and Friday.  Thank you for trusting [facility] and Dr. Damon with your care.  We are honored that you entrusted us with your healthcare needs. Your satisfaction is very important to us and we hope you have been very pleased with your experience at [facility]. After your discharge you may receive a survey asking you to rate your hospital experience. We encourage you to take the time to complete the survey as your feedback allows us to identify areas for improvement as well as recognize our staff.   We hope that you have received the very best care possible during your hospitalization at [facility], as your satisfaction is our top priority.

## 2025-06-04 NOTE — NURSING
05/28/25 1315   Post-Hemodialysis Assessment   Rinseback Volume (mL) 250 mL   Blood Volume Processed (Liters) 54739 L   Dialyzer Clearance Clear   Duration of Treatment 150 minutes   Additional Fluid Intake (mL) 250 mL   Total UF (mL) 1000 mL   Net Fluid Removal 500   Patient Response to Treatment well tolerated   Post-Hemodialysis Comments trx well tolerated,all blood returned,aseptic technique used, no complications noted, report given to floor nurse       
   05/29/25 1655   Post-Hemodialysis Assessment   Rinseback Volume (mL) 250 mL   Blood Volume Processed (Liters) 44 L   Dialyzer Clearance Clear   Duration of Treatment 150 minutes   Additional Fluid Intake (mL) 250 mL   Total UF (mL) 1000 mL   Net Fluid Removal 500   Patient Response to Treatment well   Post-Hemodialysis Comments Tx well tolerated, all blood returned, no complications noted, aseptic technique used, report given to floor nurse, pt awake and alert upon departure back to the floor       
   06/03/25 1244   Vital Signs   Temp 97.3 °F (36.3 °C)   Pulse 67   Resp 16   /70        Hemodialysis Catheter 05/27/25 right subclavian   Placement Date: 05/27/25   Present Prior to Hospital Arrival?: No  Location: right subclavian   Line Necessity Review CRRT/HD   Site Assessment No drainage;No redness;No swelling;No warmth   Line Securement Device Secured with sutures   Dressing Type CHG impregnated dressing/sponge   Dressing Status Clean;Dry;Intact   Dressing Intervention Integrity maintained   Venous Patency/Care accessed;flushed w/o difficulty;heparin locked   Arterial Patency/Care accessed;flushed w/o difficulty;heparin locked   Post-Hemodialysis Assessment   Rinseback Volume (mL) 250 mL   Blood Volume Processed (Liters) 69.4 L   Dialyzer Clearance Lightly streaked   Duration of Treatment 180 minutes   Total UF (mL) 1500 mL   Net Fluid Removal 1000   Patient Response to Treatment tolerated well   Post-Hemodialysis Comments both ports flushed w/NS Heparin instilled into each port and capped       
 Latest Reference Range & Units 05/26/25 20:17 05/27/25 03:09   PTT 21.0 - 32.0 seconds 49.0 (H) 44.5 (H)       Therapeutic x2 per nomogram. Next aPTT with am lab draw 5/28/25.  
HD cath site dressing reinforced x3 on shift so far due to increase bleed. Right elbow wound began bleeding as well, dressing reinforced. Heparin drip remains on hold    
Heparin drip remains on hold per MD, to be reevaluated by AM shift per MD, will continue to monitor  
Karin daughter here at bedside to transport patient home.  Pt rolled via wheelchair to POV.  All instructions verified with daughter and [facility] to follow up.   
Labs due at 0145 not drawn yet, lab called and stated someone should be on the floor to collect and will receive a call  
Paged nephro on call, awaiting call back    6:30a Brijesh SAUNDERS messaged and notified  
Patient continues to bleed at HD site to right chest wall, MD notified states to hold heparin infusion. Dressing reinforced   
Patient received in room 356 from ED. Patient is Aox4.vitals stable in room air.patient complaining of severe pain in his neck and spine. patient has an ileostomy tube in place.heparin infusion continued from the ED.   
Sterile dressing change to HD site completed    6:35a pressure bag placed to site unitl nephro comes to bedside    7am Dr. Garcia also aware of bleeding site, d/c'd all thinners  
VSS on RA and afebrile this shift.  Tolerating pain on PRNs PO x2. Good appetite and good UOP this shift. Repositions self independently in bed and ambulating around room..     Free from injury or skin breakdown; Fall precautions maintained and call light in reach.  POC updated questions answered and comments acknowledged.  Purposeful hourly rounding completed this shift.  
IV discontinued, cath removed intact

## 2025-06-04 NOTE — PLAN OF CARE
Discharge instructions reviewed and given to patient. Medication already delivered to room. Two IV's removed from Right arm.  Dialysis catheter to right chest wall remains intact.  Wheelchair delivered to room. Pt to take home. PT called daughter for her transport back home.  Telemetry box returned.

## 2025-06-04 NOTE — ASSESSMENT & PLAN NOTE
-Patient presented with severe right neck/shoulder pain and with significant worsening debility from baseline.   -MRI C-spine showed severe degenerative changes producing severe foraminal and central canal stenosis from C2 through C5 and severe left foraminal stenosis at C5-6.    -MRI soft tissue neck shows no soft tissue mass or lymphadenopathy throughout the soft tissues of the neck.   -Dr. Adan Santiago (Spine Orthopedic Surgery) evaluated patient and recommended high-dose steroids x3 days to treat spinal stenosis along with therapy.  He completed the high dose iv steroids and pain dramatically improved.  Completing medrol taper now.   -Also noted right knee effusion which was tapped by Dr. Santiago.  Fluid showed 50,000 wbc with PMN predominance and negative gram stain.  Effusion felt secondary to inflammation and not infection.  Cultures were negative and antibiotics discontinued.  -PT/OT consulted and recommend home health at discharge.  Will need follow-up with Dr. Santiago after discharge  -Patient seen and examined and he is doing well.  Outpatient dialysis has been setup.  He is medically stable for discharge home with home health.  Needs follow up with pcp within 1 week, spinal orthopedic surgery within 2 weeks, vascular neurology within 1 month and nephrology within 2 weeks.  Home health ordered.

## 2025-06-06 DIAGNOSIS — Z01.818 PRE-OPERATIVE CLEARANCE: Primary | ICD-10-CM

## 2025-06-09 PROBLEM — E44.0 MODERATE MALNUTRITION: Status: RESOLVED | Noted: 2025-06-09 | Resolved: 2025-06-09

## 2025-06-09 PROBLEM — E44.0 MODERATE MALNUTRITION: Status: ACTIVE | Noted: 2025-06-09

## 2025-06-09 LAB — FUNGUS SPEC CULT: NORMAL

## 2025-06-11 ENCOUNTER — TELEPHONE (OUTPATIENT)
Dept: ENDOCRINOLOGY | Facility: CLINIC | Age: 75
End: 2025-06-11
Payer: MEDICARE

## 2025-06-11 PROBLEM — E16.2 HYPOGLYCEMIA: Status: RESOLVED | Noted: 2023-02-10 | Resolved: 2025-06-11

## 2025-06-11 NOTE — TELEPHONE ENCOUNTER
----- Message from Calista Gonzáles PA-C sent at 6/11/2025  4:13 PM CDT -----  Regarding: RE: New Patient, Hosp FU  He's an established patient so he can pretty much be seen wherever we have a 30 minute slot.  ----- Message -----  From: Yajaira Charlton MA  Sent: 6/11/2025   4:09 PM CDT  To: Calista Gonzáles PA-C  Subject: FW: New Patient, Hosp FU                         Tell me how soon you want me to schedule him  ----- Message -----  From: Bing Garza  Sent: 6/11/2025   3:33 PM CDT  To: Eliecer Baumannh Pam Staff  Subject: New Patient, Hosp FU                             Good afternoon, Patient discharging from hospital today and interested in an DEXCOM. Please call patient is there is any sooner availability than August. Thank you Bing

## 2025-06-12 ENCOUNTER — PATIENT OUTREACH (OUTPATIENT)
Dept: ADMINISTRATIVE | Facility: CLINIC | Age: 75
End: 2025-06-12
Payer: MEDICARE

## 2025-06-12 ENCOUNTER — TELEPHONE (OUTPATIENT)
Dept: PRIMARY CARE CLINIC | Facility: CLINIC | Age: 75
End: 2025-06-12
Payer: MEDICARE

## 2025-06-12 ENCOUNTER — TELEPHONE (OUTPATIENT)
Dept: DIABETES | Facility: CLINIC | Age: 75
End: 2025-06-12
Payer: MEDICARE

## 2025-06-12 ENCOUNTER — TELEPHONE (OUTPATIENT)
Dept: ORTHOPEDICS | Facility: CLINIC | Age: 75
End: 2025-06-12
Payer: MEDICARE

## 2025-06-12 NOTE — PROGRESS NOTES
C3 nurse attempted to contact Jarrett Charlton Jr. for a TCC post hospital discharge follow up call. No answer, left VM with callback information. The AVS states the pt has a follow-up with his PCP, Olga Hampton MD on 6/16/25 at 1300, but it isn't viewable in Epic. Message sent to Olga Hampton MD's staff ensuring the visit is correct and listed as a 'HOSFU.'

## 2025-06-12 NOTE — TELEPHONE ENCOUNTER
----- Message from Nurse Damon sent at 6/11/2025  1:57 PM CDT -----  Regarding: RE: New Patient, Dexcom, Hosp FU  In pt chart it states pt was dismissed from center, can you look into this please before we reach out to schedule pt  ----- Message -----  From: Bing Garza  Sent: 6/11/2025   1:27 PM CDT  To: Gurwinder Lechuga Staff  Subject: New Patient, Dexcom, Hosp FU                     Good Afternoon, Patient discharging from hospital today in need of a new patient follow up. Patient interested in getting DEXCOM. Please call patient directly to set up appointment. Thank you Bing

## 2025-06-12 NOTE — TELEPHONE ENCOUNTER
Called patient regarding joint aspiration results.  Discussed he has pseudogout.  He was discharged on prednisone.  Discussed if anything worsens we could try injection in clinic.  He was thankful for the call and will reach out with any further questions or concerns.

## 2025-06-13 PROBLEM — M11.261 PSEUDOGOUT OF RIGHT KNEE: Status: ACTIVE | Noted: 2025-06-13

## 2025-06-13 PROBLEM — M11.261 PSEUDOGOUT OF RIGHT KNEE: Status: ACTIVE | Noted: 2025-06-11

## 2025-06-13 NOTE — PROGRESS NOTES
C3 RN spoke with patient Jarrett Charlton  For a TCC Post Discharge Phone Call. Patient's discharge tab in Epic shows he has a follow-up with Olga Hampton MD on 6/16/25 at 1300, but his AVS said his appt is 6/17/25 at 1100. Neither show in Epic so I am unable to verify. Message routed to Olga Hampton MD's staff requesting they contact the pt for clarification.

## 2025-06-13 NOTE — TELEPHONE ENCOUNTER
Patient's daughter phoned and asked to have pt's appointment changed from 6/16/2025 to 6/17/2025. Patient's daughter stated she would be able to bring the patient on that day.

## 2025-06-18 ENCOUNTER — PATIENT MESSAGE (OUTPATIENT)
Dept: CARDIOLOGY | Facility: CLINIC | Age: 75
End: 2025-06-18
Payer: MEDICARE

## 2025-06-18 ENCOUNTER — TELEPHONE (OUTPATIENT)
Dept: GASTROENTEROLOGY | Facility: CLINIC | Age: 75
End: 2025-06-18
Payer: MEDICARE

## 2025-06-18 NOTE — TELEPHONE ENCOUNTER
----- Message from Ian sent at 6/18/2025  8:06 AM CDT -----  Regarding: Questions  Contact: [phone]  Questions: Pt daughter (Latisha) called in and would like for a nurse  or someone in the department to call her because she  has questions before her dads procedure on 07/01/25.    Patient contact num: [phone]    Diagnosis:  Pancreas cyst

## 2025-06-18 NOTE — TELEPHONE ENCOUNTER
Spoke with pt's daughter, Latisha, regarding pt's upcoming EUS on 7/1/25 for evaluation of pancreas cyst.  Pt was recently admitted for leg pain and swelling after a fall and was recently started on dialysis.  Pt has multiple follow up appointments scheduled with cardiology for ablation/afib in July, vascular surgery, neurology, and urology.  EUS cancelled at this time until cardiology clearance can be obtained after ablation. Pt removed from schedule.

## 2025-06-20 ENCOUNTER — PATIENT MESSAGE (OUTPATIENT)
Dept: VASCULAR SURGERY | Facility: CLINIC | Age: 75
End: 2025-06-20
Payer: MEDICARE

## 2025-06-24 RX ORDER — GABAPENTIN 100 MG/1
200 CAPSULE ORAL NIGHTLY
Qty: 160 CAPSULE | Refills: 0 | Status: SHIPPED | OUTPATIENT
Start: 2025-06-24

## 2025-06-27 ENCOUNTER — HOSPITAL ENCOUNTER (OUTPATIENT)
Dept: VASCULAR SURGERY | Facility: CLINIC | Age: 75
Discharge: HOME | End: 2025-06-27
Attending: SURGERY
Payer: MEDICARE

## 2025-06-27 ENCOUNTER — INITIAL CONSULT (OUTPATIENT)
Dept: VASCULAR SURGERY | Facility: CLINIC | Age: 75
End: 2025-06-27
Payer: MEDICARE

## 2025-06-27 VITALS
DIASTOLIC BLOOD PRESSURE: 83 MMHG | HEIGHT: 72 IN | SYSTOLIC BLOOD PRESSURE: 152 MMHG | HEART RATE: 60 BPM | BODY MASS INDEX: 23.59 KG/M2 | WEIGHT: 174.19 LBS | TEMPERATURE: 98 F

## 2025-06-27 DIAGNOSIS — Z99.2 ESRD (END STAGE RENAL DISEASE) ON DIALYSIS: Primary | ICD-10-CM

## 2025-06-27 DIAGNOSIS — N18.6 ESRD (END STAGE RENAL DISEASE) ON DIALYSIS: Primary | ICD-10-CM

## 2025-06-27 DIAGNOSIS — N18.4 STAGE 4 CHRONIC KIDNEY DISEASE: ICD-10-CM

## 2025-06-27 DIAGNOSIS — Z01.818 PRE-OPERATIVE CLEARANCE: ICD-10-CM

## 2025-06-27 PROCEDURE — 93985 DUP-SCAN HEMO COMPL BI STD: CPT | Mod: S$GLB,,, | Performed by: STUDENT IN AN ORGANIZED HEALTH CARE EDUCATION/TRAINING PROGRAM

## 2025-06-27 PROCEDURE — 99999 PR PBB SHADOW E&M-EST. PATIENT-LVL V: CPT | Mod: PBBFAC,,, | Performed by: SURGERY

## 2025-06-27 NOTE — PROGRESS NOTES
VASCULAR SURGERY SERVICE    REFERRING DOCTOR: Priya Mora MD  Ref Provider     CHIEF COMPLAINT: ESRD    HISTORY OF PRESENT ILLNESS: Jarrett Charlton Jr. is a 75 y.o. male double medical comorbidities including severe pulmonary hypertension (PA pressure 73), normal ejection fraction, 60%, fixed perfusion defect on recent stress test, prior DVT on Eliquis, with new onset end-stage renal disease, who initiated hemodialysis Monday Wednesday Friday 2-3 weeks ago.  He is here for permanent access.    He is right-handed he has no history of AICD or pacemaker placement.    He is on Eliquis (for DVT), and aspirin    He states he can walk a flight of stairs    Past Medical History:   Diagnosis Date    Autonomic dysfunction 11/2023 11/2023 neuro note    Basal cell carcinoma     BPH (benign prostatic hyperplasia)     s/p TURP    CAD (coronary artery disease) 2017    status post PCI to mid LAD in 2017 (LHC in 2021 showed patent stent, otherwise nonobstructive CAD)    Cancer of prostate     s/p TURP    Chronic kidney disease     DDD (degenerative disc disease) 10/21/2013    Diabetes mellitus with renal complications     Disc disease, degenerative, cervical     DVT (deep venous thrombosis)     Encounter for blood transfusion     GERD (gastroesophageal reflux disease)     History of ulcerative colitis 1982    s/p colectomy and ileostomy    HLD (hyperlipidemia)     Ileostomy in place 1982    RBBB     Squamous cell carcinoma 03/08/2018    Left superior helix near insertion    Squamous cell carcinoma 04/12/2018    Left forearm x 5    Ventricular tachycardia        Past Surgical History:   Procedure Laterality Date    ABLATION, SVT, ACCESSORY PATHWAY N/A 04/30/2024    Procedure: Ablation, SVT, Accessory Pathway;  Surgeon: Franky Taylor MD;  Location: [facility] EP LAB;  Service: Cardiology;  Laterality: N/A;  VT, RFA, Carto, MAC, GP, 3 Prep *MDT ILR in situ*    cardiac stents      CATARACT EXTRACTION Bilateral     CERVICAL  FUSION      CHOLECYSTECTOMY N/A 02/14/2023    Procedure: CHOLECYSTECTOMY;  Surgeon: Mike Joyce MD;  Location: [facility] OR;  Service: General;  Laterality: N/A;  very high probabilty of converison to open    colectomy and ileostomy  1985    EGD, WITH CLOSED BIOPSY  04/04/2025    ENDOSCOPIC ULTRASOUND OF UPPER GASTROINTESTINAL TRACT N/A 02/10/2023    Procedure: ULTRASOUND, UPPER GI TRACT, ENDOSCOPIC;  Surgeon: Poli Fuller MD;  Location: [facility] ENDO;  Service: Endoscopy;  Laterality: N/A;    ERCP N/A 02/10/2023    Procedure: ERCP (ENDOSCOPIC RETROGRADE CHOLANGIOPANCREATOGRAPHY);  Surgeon: Poli Fuller MD;  Location: [facility] ENDO;  Service: Endoscopy;  Laterality: N/A;    ESOPHAGOGASTRODUODENOSCOPY N/A 4/4/2025    Procedure: EGD (ESOPHAGOGASTRODUODENOSCOPY);  Surgeon: Melania Gibson MD;  Location: [facility];  Service: Endoscopy;  Laterality: N/A;    EXCISION OF PAROTID GLAND Left 12/18/2020    Procedure: EXCISION, PAROTID GLAND;  Surgeon: Michael Pinzon MD;  [facility];  Service: ENT;  Laterality: Left;    INSERTION OF IMPLANTABLE LOOP RECORDER  06/07/2021    INSERTION OF IMPLANTABLE LOOP RECORDER Left 06/07/2021    Procedure: INSERTION, IMPLANTABLE LOOP RECORDER;  Surgeon: Romario Dao MD;  Location: [facility] CATH LAB;  Service: Cardiology;  Laterality: Left;    INSERTION, CATHETER, TUNNELED N/A 5/27/2025    Procedure: Insertion,catheter,tunneled;  Surgeon: Jamie Stokes MD;  Location: [facility] CATH LAB;  Service: Radiology;  Laterality: N/A;    LEFT HEART CATHETERIZATION Right 04/15/2021    Procedure: CATHETERIZATION, HEART, LEFT;  Surgeon: Marcio Jones MD;  Location: [facility] CATH LAB;  Service: Cardiology;  Laterality: Right;    LYSIS OF ADHESIONS N/A 11/09/2020    Procedure: LYSIS, ADHESIONS,  ERAS low;  Surgeon: CED Haley MD;  Location: [facility] OR 2ND FLR;  Service: Colon and Rectal;  Laterality: N/A;    LYSIS OF ADHESIONS N/A 02/14/2023    Procedure: LYSIS, ADHESIONS;   Surgeon: Mike Joyce MD;  Location: [facility];  Service: General;  Laterality: N/A;    POUCHOSCOPY N/A 04/06/2022    Procedure: ENDOSCOPY, POUCH, SMALL INTESTINE, DIAGNOSTIC;  Surgeon: Dieter Juarez MD;  Location: [facility] ENDO (2ND FLR);  Service: Endoscopy;  Laterality: N/A;    REPAIR, HERNIA, PARASTOMAL N/A 11/09/2020    Procedure: REPAIR, HERNIA, PARASTOMAL;  Surgeon: CED Haley MD;  Location: [facility] OR 2ND FLR;  Service: Colon and Rectal;  Laterality: N/A;    TRANSURETHRAL RESECTION OF PROSTATE (TURP) WITHOUT USE OF LASER N/A 01/23/2019    Procedure: TURP, WITHOUT USING LASER BIPOLAR;  Surgeon: Catarino Mota MD;  Location: [facility] 1ST FLR;  Service: Urology;  Laterality: N/A;  1.5 HOURS    TREATMENT OF CARDIAC ARRHYTHMIA  04/30/2024    Procedure: Cardioversion or Defibrillation;  Surgeon: Franky Taylor MD;  Location: [facility] EP LAB;  Service: Cardiology;;       Current Medications[1]    Review of patient's allergies indicates:   Allergen Reactions    Atorvastatin     Rosuvastatin        Family History   Problem Relation Name Age of Onset    Dementia Mother      Cancer Father      Diabetes Father      Heart disease Father      Melanoma Neg Hx      Hypertension Neg Hx      Arthritis Neg Hx         Social History[2]    REVIEW OF SYSTEMS:  General: No chills, fever, malaise, changes in weight  HEENT: No visual changes, difficulty hearing  Cardiovascular: No chest pain, palpitations, claudication  Pulmonary: No dyspnea, cough, wheezing  Gastrointestinal: No nausea, vomiting, diarrhea, constipation  Genitourinary: No dysuria, low urine output, hematuria  Endocrine: No polydipsia, polyphagia  Hematologic: No fatigue with exertion, pica, pallor  Musculoskeletal: No extremity or joint pain, no back pain, no difficulty with ambulation  Neurologic: no seizures, no headaches, no weakness  Psychiatric: no mood disturbance      PHYSICAL EXAM:  Brachial pressures are symmetric  BP (!) 152/83 (BP Location: Left  arm, Patient Position: Sitting)   Pulse 60   Temp 98 °F (36.7 °C) (Oral)   Ht 6' (1.829 m)   Wt 79 kg (174 lb 2.6 oz)   BMI 23.62 kg/m²   Constitutional:  Alert,   Well-appearing  In no distress.  He does not appear particularly frail, ambulating on his own with the aid of a cane   Neurological: Normal speech  no focal findings  CN II - XII grossly intact.    Psychiatric: Mood and affect appropriate and symmetric.   HEENT: Normocephalic / atraumatic  PERRLA  Midline trachea  No scars across the neck right IJ PermCath in place   Cardiac: Regular rate and rhythm.   Pulmonary: Normal pulmonary effort.   Abdomen: Soft, not distended.     Skin: Warm and well perfused.    Vascular:  2+ radial and brachial pulses bilaterally   Extremities/  Musculoskeletal: No edema.   Right arm demonstrates a ballotable antecubital vein and upper arm vein with a tourniquet.  Arms are slender    Forearms with significant chronic sun induced skin changes     IMAGING:  Vein mapping suggests potentially adequate right upper arm cephalic vein, 3.8 antecubital fossa 2.42.5 in the distal to mid veins, 3.5 upper vein.  Left cephalic vein which smaller    IMPRESSION:  Potential autogenous AV fistula candidate    PLAN:  Right brachiocephalic AV fistula 7/31/2025.   The patient was offered a much much earlier date (7/3/2025) but his only daughter who is the sole transportation has work conflicts.  She is going to see if she can adjust this to do this much sooner  Regional block  Hold [facility] for 2 days prior    The patient and daughter understand that I will personally interrogate his cephalic vein a immediately prior to start of surgery.  If I do not feel the vein is adequate then would proceed with a prosthetic AV graft    I have explained the risks, benefits and alternatives for this procedure in detail.  The patient voices understanding and all questions have be answered, and agrees to proceed with the procedure.     CED Bains,  MD MANI, FACS    Cc:Priya Mora MD  Ref Provider   Professor and Chief, Vascular and Endovascular Surgery           [1]   Current Outpatient Medications:     ACCU-CHEK SOFTCLIX LANCETS Misc, TEST BLOOD SUGAR THREE TIMES DAILY, Disp: 300 each, Rfl: 3    alcohol swabs (DROPSAFE ALCOHOL PREP PADS) PadM, Apply 1 each topically once daily., Disp: 500 each, Rfl: 3    allopurinoL (ZYLOPRIM) 100 MG tablet, Take 1 tablet (100 mg total) by mouth once daily., Disp: 30 tablet, Rfl: 1    apixaban (ELIQUIS) 2.5 mg Tab, Take 1 tablet (2.5 mg total) by mouth 2 (two) times daily., Disp: 60 tablet, Rfl: 1    aspirin (ECOTRIN) 81 MG EC tablet, Take 81 mg by mouth once daily., Disp: , Rfl:     blood sugar diagnostic (ACCU-CHEK GUIDE TEST STRIPS) Strp, 1 each by Other route 3 (three) times daily. Test Blood Sugar, Disp: 300 strip, Rfl: 10    blood-glucose meter (ACCU-CHEK GUIDE GLUCOSE METER) Misc, Accucheck BID, Disp: 1 each, Rfl: 0    brimonidine 0.2% (ALPHAGAN) 0.2 % Drop, Place 1 drop into both eyes 2 (two) times a day., Disp: 60 mL, Rfl: 3    ferrous sulfate (FEOSOL) 325 mg (65 mg iron) Tab tablet, Take 1 tablet (325 mg total) by mouth 3 (three) times daily., Disp: 30 tablet, Rfl: 0    ferrous sulfate (IRON) 325 mg (65 mg iron) Tab tablet, Take 1 tablet (325 mg total) by mouth every other day., Disp: 45 tablet, Rfl: 3    gabapentin (NEURONTIN) 100 MG capsule, Take 2 capsules (200 mg total) by mouth every evening., Disp: 160 capsule, Rfl: 0    [Paused] glimepiride (AMARYL) 4 MG tablet, Take 1 tablet (4 mg total) by mouth daily with breakfast., Disp: , Rfl:     leuprolide acetate, 6 month, (LUPRON) 45 mg SyKt injection, Inject 45 mg into the muscle every 6 (six) months. for 4 doses, Disp: , Rfl:     levothyroxine (SYNTHROID) 25 MCG tablet, Take 1 tablet (25 mcg total) by mouth before breakfast., Disp: 30 tablet, Rfl: 0    LIDOcaine (LIDODERM) 5 %, Place 1 patch onto the skin once daily. Remove & Discard patch within 12 hours  or as directed by MD, Disp: 30 patch, Rfl: 1    metoprolol succinate (TOPROL-XL) 25 MG 24 hr tablet, Take 0.5 tablets (12.5 mg total) by mouth once daily., Disp: 45 tablet, Rfl: 3    midodrine (PROAMATINE) 10 MG tablet, Take 1 tablet (10 mg total) by mouth 3 (three) times daily., Disp: 90 tablet, Rfl: 0    oxyCODONE (ROXICODONE) 5 MG immediate release tablet, Take 1 tablet (5 mg total) by mouth every 8 (eight) hours as needed for Pain., Disp: 20 tablet, Rfl: 0    pantoprazole (PROTONIX) 40 MG tablet, Take 40 mg by mouth once daily., Disp: , Rfl:     pravastatin (PRAVACHOL) 40 MG tablet, Take 1 tablet (40 mg total) by mouth once daily., Disp: 90 tablet, Rfl: 3    predniSONE (DELTASONE) 10 MG tablet, Take 4 tablets (40 mg total) by mouth once daily for 4 days, THEN 3 tablets (30 mg total) once daily for 3 days, THEN 2 tablets (20 mg total) once daily for 3 days, THEN 1 tablet (10 mg total) once daily for 3 days, THEN 0.5 tablets (5 mg total) once daily for 3 days., Disp: 36 tablet, Rfl: 0    silver sulfADIAZINE 1% (SILVADENE) 1 % cream, Apply to affected area daily, Disp: 20 g, Rfl: 0    tamsulosin (FLOMAX) 0.4 mg Cap, Take 1 capsule (0.4 mg total) by mouth once daily., Disp: 90 capsule, Rfl: 3    vitamin renal formula, B-complex-vitamin c-folic acid, (NEPHROCAP) 1 mg Cap, Take 1 capsule by mouth once daily., Disp: 90 each, Rfl: 0    amiodarone (PACERONE) 200 MG Tab, Take 1 tablet (200 mg total) by mouth once daily., Disp: 90 tablet, Rfl: 1    Current Facility-Administered Medications:     leuprolide acetate (6 month) injection 45 mg, 45 mg, Intramuscular, Q6 Months,     Facility-Administered Medications Ordered in Other Visits:     sodium chloride 0.9% in 1,000 mL infusion, , Intravenous, Continuous, Order, Paper  [2]   Social History  Tobacco Use    Smoking status: Never     Passive exposure: Never    Smokeless tobacco: Never   Substance Use Topics    Alcohol use: No    Drug use: No

## 2025-06-30 ENCOUNTER — TELEPHONE (OUTPATIENT)
Dept: ELECTROPHYSIOLOGY | Facility: CLINIC | Age: 75
End: 2025-06-30
Payer: MEDICARE

## 2025-06-30 ENCOUNTER — DOCUMENT SCAN (OUTPATIENT)
Dept: HOME HEALTH SERVICES | Facility: HOSPITAL | Age: 75
End: 2025-06-30
Payer: MEDICARE

## 2025-06-30 ENCOUNTER — PATIENT MESSAGE (OUTPATIENT)
Dept: ELECTROPHYSIOLOGY | Facility: CLINIC | Age: 75
End: 2025-06-30
Payer: MEDICARE

## 2025-06-30 DIAGNOSIS — I48.0 PAF (PAROXYSMAL ATRIAL FIBRILLATION): Primary | ICD-10-CM

## 2025-06-30 NOTE — TELEPHONE ENCOUNTER
----- Message from Quynh sent at 6/30/2025  3:05 PM CDT -----  Regarding: Prescription  [facility] calling to get a prescription change from apixaban (ELIQUIS) 2.5 mg Tab and patient insurance wants him to do eliquis 5 mg a day. Please fax over new prescription to [facility] [address] [place] [address]   Phone: [phone]  Fax: [phone]

## 2025-07-03 ENCOUNTER — TELEPHONE (OUTPATIENT)
Dept: ELECTROPHYSIOLOGY | Facility: CLINIC | Age: 75
End: 2025-07-03
Payer: MEDICARE

## 2025-07-10 ENCOUNTER — PATIENT MESSAGE (OUTPATIENT)
Dept: ELECTROPHYSIOLOGY | Facility: CLINIC | Age: 75
End: 2025-07-10
Payer: MEDICARE

## 2025-07-10 ENCOUNTER — TELEPHONE (OUTPATIENT)
Dept: ELECTROPHYSIOLOGY | Facility: CLINIC | Age: 75
End: 2025-07-10
Payer: MEDICARE

## 2025-07-10 NOTE — TELEPHONE ENCOUNTER
Upon chart review for patient's upcoming procedure with Dr. Taylor, it was noted that the patient has a scheduled procedure with Dr. Szymanski on 7/31 for AV Fistula creation requiring patient to hold Eliquis for 2 days prior. Call placed to pt's daughter notifying that patient will not be cleared to hold Eliquis for 8 weeks post PVI RFA. Daughter will speak with patient and dialysis team to decide which procedure should come first. Will follow up.

## 2025-07-11 RX ORDER — AMIODARONE HYDROCHLORIDE 200 MG/1
200 TABLET ORAL
Qty: 100 TABLET | Refills: 3 | Status: SHIPPED | OUTPATIENT
Start: 2025-07-11

## 2025-07-14 ENCOUNTER — TELEPHONE (OUTPATIENT)
Dept: ELECTROPHYSIOLOGY | Facility: CLINIC | Age: 75
End: 2025-07-14
Payer: MEDICARE

## 2025-07-14 NOTE — TELEPHONE ENCOUNTER
Spoke to Patient    CONFIRMED procedure arrival time of 8:00 AM    Reiterated instructions including:    -Directions to check in desk  -No food after midnight night prior to procedure, water only after midnight until 0600. Fasting upon arrival to the hospital the day of the procedure.  -High importance of HOLDING Amiodarone 14 days (last dose on 6/30); Hold Eliquis, Toprol, and glimepiride AM of procedure  -Confirmed compliance of Eliquis  - Confirms no new meds prescribed or med changes since scheduling  - Confirms not taking any semaglutide medication  -Pre-procedure LABS reviewed - pt with long history of anemia 2/2 CKD; Elevated Cr - dialysis pt.  -Confirmed presence of implanted device/stimulator *MDT ILR in situ; *R SCV tunneled dialysis cath  -Confirmed no recent or current fever, cough, or shortness of breath .  -Confirmed no redness, rash, irritation, or yeast infection to groin area.   -Dialysis team notified.  -Bathe night prior and morning prior to procedure with Hibiclens solution or an antibacterial soap  -Reviewed current visitor policy    Patient verbalized understanding of above, denies any further questions and appreciated the call.

## 2025-07-15 ENCOUNTER — HOSPITAL ENCOUNTER (OUTPATIENT)
Facility: HOSPITAL | Age: 75
Discharge: HOME | End: 2025-07-15
Attending: INTERNAL MEDICINE | Admitting: INTERNAL MEDICINE
Payer: MEDICARE

## 2025-07-15 ENCOUNTER — ANESTHESIA (OUTPATIENT)
Dept: MEDSURG UNIT | Facility: HOSPITAL | Age: 75
End: 2025-07-15
Payer: MEDICARE

## 2025-07-15 ENCOUNTER — ANESTHESIA EVENT (OUTPATIENT)
Dept: MEDSURG UNIT | Facility: HOSPITAL | Age: 75
End: 2025-07-15
Payer: MEDICARE

## 2025-07-15 ENCOUNTER — HOSPITAL ENCOUNTER (OUTPATIENT)
Dept: CARDIOLOGY | Facility: HOSPITAL | Age: 75
Discharge: HOME | End: 2025-07-15
Attending: INTERNAL MEDICINE | Admitting: INTERNAL MEDICINE
Payer: MEDICARE

## 2025-07-15 VITALS
DIASTOLIC BLOOD PRESSURE: 75 MMHG | HEIGHT: 74 IN | BODY MASS INDEX: 21.69 KG/M2 | HEART RATE: 59 BPM | SYSTOLIC BLOOD PRESSURE: 179 MMHG | WEIGHT: 169 LBS

## 2025-07-15 VITALS
HEIGHT: 74 IN | TEMPERATURE: 97 F | OXYGEN SATURATION: 99 % | BODY MASS INDEX: 21.69 KG/M2 | WEIGHT: 169 LBS | SYSTOLIC BLOOD PRESSURE: 127 MMHG | DIASTOLIC BLOOD PRESSURE: 56 MMHG | RESPIRATION RATE: 18 BRPM | HEART RATE: 61 BPM

## 2025-07-15 DIAGNOSIS — I49.9 ARRHYTHMIA: ICD-10-CM

## 2025-07-15 DIAGNOSIS — I48.0 PAF (PAROXYSMAL ATRIAL FIBRILLATION): ICD-10-CM

## 2025-07-15 DIAGNOSIS — I48.91 ATRIAL FIBRILLATION: ICD-10-CM

## 2025-07-15 LAB
AORTIC SIZE INDEX (SOV): 1.9 CM/M2
AORTIC SIZE INDEX: 2.2 CM/M2
ASCENDING AORTA: 4.4 CM
BSA FOR ECHO PROCEDURE: 2 M2
EJECTION FRACTION: 45 %
LAA PV: 40 CM/S
Lab: 2 CM/M
Lab: 2.3 CM/M
OHS CV CPX PATIENT HEIGHT IN: 74
OHS CV CPX PATIENT HEIGHT IN: 74
OHS QRS DURATION: 116 MS
OHS QRS DURATION: 150 MS
OHS QTC CALCULATION: 486 MS
OHS QTC CALCULATION: 548 MS
POC ACTIVATED CLOTTING TIME K: 107 SEC (ref 74–137)
POC ACTIVATED CLOTTING TIME K: 124 SEC (ref 74–137)
POC ACTIVATED CLOTTING TIME K: 331 SEC (ref 74–137)
POC ACTIVATED CLOTTING TIME K: 349 SEC (ref 74–137)
POC ACTIVATED CLOTTING TIME K: 366 SEC (ref 74–137)
POC ACTIVATED CLOTTING TIME K: 394 SEC (ref 74–137)
POCT GLUCOSE: 97 MG/DL (ref 70–110)
SAMPLE: ABNORMAL
SAMPLE: NORMAL
SAMPLE: NORMAL
SINUS: 3.8 CM
STJ: 3.2 CM

## 2025-07-15 PROCEDURE — 93320 DOPPLER ECHO COMPLETE: CPT

## 2025-07-15 PROCEDURE — C1769 GUIDE WIRE: HCPCS | Performed by: INTERNAL MEDICINE

## 2025-07-15 PROCEDURE — 25000003 PHARM REV CODE 250: Performed by: NURSE ANESTHETIST, CERTIFIED REGISTERED

## 2025-07-15 PROCEDURE — C1733 CATH, EP, OTHR THAN COOL-TIP: HCPCS | Performed by: INTERNAL MEDICINE

## 2025-07-15 PROCEDURE — 82962 GLUCOSE BLOOD TEST: CPT | Performed by: INTERNAL MEDICINE

## 2025-07-15 PROCEDURE — 93320 DOPPLER ECHO COMPLETE: CPT | Mod: 26,,, | Performed by: INTERNAL MEDICINE

## 2025-07-15 PROCEDURE — 25000003 PHARM REV CODE 250: Performed by: INTERNAL MEDICINE

## 2025-07-15 PROCEDURE — 93005 ELECTROCARDIOGRAM TRACING: CPT

## 2025-07-15 PROCEDURE — 63600175 PHARM REV CODE 636 W HCPCS: Performed by: INTERNAL MEDICINE

## 2025-07-15 PROCEDURE — C1894 INTRO/SHEATH, NON-LASER: HCPCS | Performed by: INTERNAL MEDICINE

## 2025-07-15 PROCEDURE — 93325 DOPPLER ECHO COLOR FLOW MAPG: CPT | Mod: 26,,, | Performed by: INTERNAL MEDICINE

## 2025-07-15 PROCEDURE — 63600175 PHARM REV CODE 636 W HCPCS: Performed by: NURSE ANESTHETIST, CERTIFIED REGISTERED

## 2025-07-15 PROCEDURE — 27201423 OPTIME MED/SURG SUP & DEVICES STERILE SUPPLY: Performed by: INTERNAL MEDICINE

## 2025-07-15 PROCEDURE — C1730 CATH, EP, 19 OR FEW ELECT: HCPCS | Performed by: INTERNAL MEDICINE

## 2025-07-15 PROCEDURE — 93010 ELECTROCARDIOGRAM REPORT: CPT | Mod: ,,, | Performed by: INTERNAL MEDICINE

## 2025-07-15 PROCEDURE — 37000009 HC ANESTHESIA EA ADD 15 MINS: Performed by: INTERNAL MEDICINE

## 2025-07-15 PROCEDURE — 82962 GLUCOSE BLOOD TEST: CPT | Mod: 91 | Performed by: INTERNAL MEDICINE

## 2025-07-15 PROCEDURE — C1732 CATH, EP, DIAG/ABL, 3D/VECT: HCPCS | Performed by: INTERNAL MEDICINE

## 2025-07-15 PROCEDURE — 93312 ECHO TRANSESOPHAGEAL: CPT | Mod: 26,,, | Performed by: INTERNAL MEDICINE

## 2025-07-15 PROCEDURE — 93656 COMPRE EP EVAL ABLTJ ATR FIB: CPT | Mod: ,,, | Performed by: INTERNAL MEDICINE

## 2025-07-15 PROCEDURE — 37000008 HC ANESTHESIA 1ST 15 MINUTES: Performed by: INTERNAL MEDICINE

## 2025-07-15 PROCEDURE — C1766 INTRO/SHEATH,STRBLE,NON-PEEL: HCPCS | Performed by: INTERNAL MEDICINE

## 2025-07-15 PROCEDURE — 93656 COMPRE EP EVAL ABLTJ ATR FIB: CPT | Performed by: INTERNAL MEDICINE

## 2025-07-15 PROCEDURE — 93010 ELECTROCARDIOGRAM REPORT: CPT | Mod: ,,, | Performed by: STUDENT IN AN ORGANIZED HEALTH CARE EDUCATION/TRAINING PROGRAM

## 2025-07-15 PROCEDURE — C1753 CATH, INTRAVAS ULTRASOUND: HCPCS | Performed by: INTERNAL MEDICINE

## 2025-07-15 RX ORDER — DEXMEDETOMIDINE HYDROCHLORIDE 100 UG/ML
INJECTION, SOLUTION INTRAVENOUS
Status: DISCONTINUED | OUTPATIENT
Start: 2025-07-15 | End: 2025-07-15

## 2025-07-15 RX ORDER — LIDOCAINE HYDROCHLORIDE 20 MG/ML
INJECTION INTRAVENOUS
Status: DISCONTINUED | OUTPATIENT
Start: 2025-07-15 | End: 2025-07-15

## 2025-07-15 RX ORDER — FENTANYL CITRATE 50 UG/ML
INJECTION, SOLUTION INTRAMUSCULAR; INTRAVENOUS
Status: DISCONTINUED | OUTPATIENT
Start: 2025-07-15 | End: 2025-07-15

## 2025-07-15 RX ORDER — HEPARIN SODIUM 10000 [USP'U]/100ML
INJECTION, SOLUTION INTRAVENOUS CONTINUOUS PRN
Status: DISCONTINUED | OUTPATIENT
Start: 2025-07-15 | End: 2025-07-15

## 2025-07-15 RX ORDER — PROPOFOL 10 MG/ML
VIAL (ML) INTRAVENOUS
Status: DISCONTINUED | OUTPATIENT
Start: 2025-07-15 | End: 2025-07-15

## 2025-07-15 RX ORDER — ONDANSETRON HYDROCHLORIDE 2 MG/ML
INJECTION, SOLUTION INTRAVENOUS
Status: DISCONTINUED | OUTPATIENT
Start: 2025-07-15 | End: 2025-07-15

## 2025-07-15 RX ORDER — HEPARIN SODIUM 1000 [USP'U]/ML
INJECTION, SOLUTION INTRAVENOUS; SUBCUTANEOUS
Status: DISCONTINUED | OUTPATIENT
Start: 2025-07-15 | End: 2025-07-15

## 2025-07-15 RX ORDER — CEFAZOLIN SODIUM 1 G/3ML
INJECTION, POWDER, FOR SOLUTION INTRAMUSCULAR; INTRAVENOUS
Status: DISCONTINUED | OUTPATIENT
Start: 2025-07-15 | End: 2025-07-15

## 2025-07-15 RX ORDER — PROTAMINE SULFATE 10 MG/ML
INJECTION, SOLUTION INTRAVENOUS
Status: DISCONTINUED | OUTPATIENT
Start: 2025-07-15 | End: 2025-07-15

## 2025-07-15 RX ORDER — VASOPRESSIN 20 [USP'U]/ML
INJECTION, SOLUTION INTRAMUSCULAR; SUBCUTANEOUS
Status: DISCONTINUED | OUTPATIENT
Start: 2025-07-15 | End: 2025-07-15

## 2025-07-15 RX ORDER — HYDROMORPHONE HYDROCHLORIDE 1 MG/ML
0.2 INJECTION, SOLUTION INTRAMUSCULAR; INTRAVENOUS; SUBCUTANEOUS EVERY 5 MIN PRN
Status: DISCONTINUED | OUTPATIENT
Start: 2025-07-15 | End: 2025-07-15 | Stop reason: HOSPADM

## 2025-07-15 RX ORDER — OXYCODONE HYDROCHLORIDE 5 MG/1
5 TABLET ORAL
Status: DISCONTINUED | OUTPATIENT
Start: 2025-07-15 | End: 2025-07-15 | Stop reason: HOSPADM

## 2025-07-15 RX ORDER — HALOPERIDOL LACTATE 5 MG/ML
0.5 INJECTION, SOLUTION INTRAMUSCULAR EVERY 10 MIN PRN
Status: DISCONTINUED | OUTPATIENT
Start: 2025-07-15 | End: 2025-07-15 | Stop reason: HOSPADM

## 2025-07-15 RX ORDER — FENTANYL CITRATE 50 UG/ML
25 INJECTION, SOLUTION INTRAMUSCULAR; INTRAVENOUS EVERY 5 MIN PRN
Status: DISCONTINUED | OUTPATIENT
Start: 2025-07-15 | End: 2025-07-15 | Stop reason: HOSPADM

## 2025-07-15 RX ORDER — HEPARIN SOD,PORCINE/0.9 % NACL 1000/500ML
INTRAVENOUS SOLUTION INTRAVENOUS
Status: DISCONTINUED | OUTPATIENT
Start: 2025-07-15 | End: 2025-07-15 | Stop reason: HOSPADM

## 2025-07-15 RX ORDER — ROCURONIUM BROMIDE 10 MG/ML
INJECTION, SOLUTION INTRAVENOUS
Status: DISCONTINUED | OUTPATIENT
Start: 2025-07-15 | End: 2025-07-15

## 2025-07-15 RX ORDER — GLUCAGON 1 MG
1 KIT INJECTION
Status: DISCONTINUED | OUTPATIENT
Start: 2025-07-15 | End: 2025-07-15 | Stop reason: HOSPADM

## 2025-07-15 RX ORDER — ATROPINE SULFATE 0.1 MG/ML
INJECTION INTRAVENOUS
Status: DISCONTINUED | OUTPATIENT
Start: 2025-07-15 | End: 2025-07-15

## 2025-07-15 RX ORDER — ACETAMINOPHEN 325 MG/1
650 TABLET ORAL EVERY 4 HOURS PRN
Status: DISCONTINUED | OUTPATIENT
Start: 2025-07-15 | End: 2025-07-15 | Stop reason: HOSPADM

## 2025-07-15 RX ORDER — ONDANSETRON HYDROCHLORIDE 2 MG/ML
4 INJECTION, SOLUTION INTRAVENOUS DAILY PRN
Status: DISCONTINUED | OUTPATIENT
Start: 2025-07-15 | End: 2025-07-15 | Stop reason: HOSPADM

## 2025-07-15 RX ORDER — LIDOCAINE HYDROCHLORIDE 20 MG/ML
INJECTION, SOLUTION INFILTRATION; PERINEURAL
Status: DISCONTINUED | OUTPATIENT
Start: 2025-07-15 | End: 2025-07-15 | Stop reason: HOSPADM

## 2025-07-15 RX ADMIN — ATROPINE SULFATE 0.5 MG: 0.1 INJECTION INTRAVENOUS at 11:07

## 2025-07-15 RX ADMIN — FENTANYL CITRATE 25 MCG: 50 INJECTION, SOLUTION INTRAMUSCULAR; INTRAVENOUS at 09:07

## 2025-07-15 RX ADMIN — ROCURONIUM BROMIDE 50 MG: 10 INJECTION, SOLUTION INTRAVENOUS at 09:07

## 2025-07-15 RX ADMIN — ROCURONIUM BROMIDE 10 MG: 10 INJECTION, SOLUTION INTRAVENOUS at 11:07

## 2025-07-15 RX ADMIN — PROPOFOL 120 MG: 10 INJECTION, EMULSION INTRAVENOUS at 09:07

## 2025-07-15 RX ADMIN — VASOPRESSIN 1 UNITS: 20 INJECTION INTRAVENOUS at 12:07

## 2025-07-15 RX ADMIN — LIDOCAINE HYDROCHLORIDE 80 MG: 20 INJECTION INTRAVENOUS at 09:07

## 2025-07-15 RX ADMIN — DEXMEDETOMIDINE 8 MCG: 200 INJECTION, SOLUTION INTRAVENOUS at 12:07

## 2025-07-15 RX ADMIN — PROTAMINE SULFATE 5 MG: 10 INJECTION, SOLUTION INTRAVENOUS at 12:07

## 2025-07-15 RX ADMIN — SODIUM CHLORIDE: 9 INJECTION, SOLUTION INTRAVENOUS at 09:07

## 2025-07-15 RX ADMIN — ONDANSETRON 4 MG: 2 INJECTION INTRAMUSCULAR; INTRAVENOUS at 12:07

## 2025-07-15 RX ADMIN — PROTAMINE SULFATE 70 MG: 10 INJECTION, SOLUTION INTRAVENOUS at 12:07

## 2025-07-15 RX ADMIN — FENTANYL CITRATE 25 MCG: 50 INJECTION, SOLUTION INTRAMUSCULAR; INTRAVENOUS at 12:07

## 2025-07-15 RX ADMIN — PHENYLEPHRINE HYDROCHLORIDE 0.3 MCG/KG/MIN: 10 INJECTION INTRAVENOUS at 09:07

## 2025-07-15 RX ADMIN — HEPARIN SODIUM 2500 UNITS: 1000 INJECTION, SOLUTION INTRAVENOUS; SUBCUTANEOUS at 11:07

## 2025-07-15 RX ADMIN — CEFAZOLIN 2 G: 330 INJECTION, POWDER, FOR SOLUTION INTRAMUSCULAR; INTRAVENOUS at 10:07

## 2025-07-15 RX ADMIN — SUGAMMADEX 200 MG: 100 INJECTION, SOLUTION INTRAVENOUS at 12:07

## 2025-07-15 RX ADMIN — ROCURONIUM BROMIDE 20 MG: 10 INJECTION, SOLUTION INTRAVENOUS at 11:07

## 2025-07-15 RX ADMIN — ACETAMINOPHEN 650 MG: 325 TABLET ORAL at 03:07

## 2025-07-15 RX ADMIN — HEPARIN SODIUM 15500 UNITS: 1000 INJECTION, SOLUTION INTRAVENOUS; SUBCUTANEOUS at 10:07

## 2025-07-15 RX ADMIN — HEPARIN SODIUM 12 UNITS/KG/HR: 10000 INJECTION, SOLUTION INTRAVENOUS at 10:07

## 2025-07-15 RX ADMIN — FENTANYL CITRATE 50 MCG: 50 INJECTION, SOLUTION INTRAMUSCULAR; INTRAVENOUS at 09:07

## 2025-07-15 RX ADMIN — ROCURONIUM BROMIDE 20 MG: 10 INJECTION, SOLUTION INTRAVENOUS at 10:07

## 2025-07-15 NOTE — PLAN OF CARE
Dr. Bowers @ bedside to discuss findings with patient. Patient able to eat, ambulate and void independently post procedure. Discharge instructions and prescriptions reviewed with patient. Patient verbalizes understanding. VSS. IV removed. NADN. Patient wheeled out via daughter.

## 2025-07-15 NOTE — H&P
[facility], [place]  Electrophysiology  H&P      Jarrett Charlton Jr.   YOB: 1950   Medical Record Number: [id number]   Attending Physician:    Date of Admission: 07/14/2025       Hospital Day:  0  Current Principal Problem:  <principal problem not specified>      History     Cc: atrial fibrillation     HPI  Mr Karla Charlton is a 75 year old gentleman with PMH of prostate cancer s/p TURP, SBO s/p colostomy, HTN, HLD, DM2, CAD s/p PCI to LAD 2017, AT s/p ablation 2024 and paroxysmal atrial fibrillation who presents to [facility] for PFA pulmonary vein isolation with Dr. Taylor. Developed symptomatic AF 8/2024 and had prolonged episodes of AF on ILR. He has been scheduled for ablation however was rescheduled due to admission for hypotension. He has issues with labile blood pressures and is on midodrine for frequent dizzy spells/syncope. He was rescheduled and presents today for PFA PVI. Notably during a recent admission for falls he was found incidentally to have a small acute CVA on brain MRI.       Presenting EKG: NSR with iRBBB   CHADS-VASc: 7 (age, HTN, DM, CAD, ?CVA)   Anticoagulants: Apixaban 2.5 mg BID   Antiarrhythmics: Amiodarone 200 mg daily   LV EF: 60% (TTE 5/2025)   Pertinent labs: Hgb 10.2, INR 1.1, Cr 4.3         Medications - Outpatient  Prior to Admission medications    Medication Sig Start Date End Date Taking? Authorizing Provider   ACCU-CHEK SOFTCLIX LANCETS Misc TEST BLOOD SUGAR THREE TIMES DAILY 12/5/24   Tripp Mohan MD   alcohol swabs (DROPSAFE ALCOHOL PREP PADS) PadM Apply 1 each topically once daily. 12/5/24   Tripp Mohan MD   allopurinoL (ZYLOPRIM) 100 MG tablet Take 1 tablet (100 mg total) by mouth once daily. 6/12/25 8/11/25  Laurence Arguello PA-C   amiodarone (PACERONE) 200 MG Tab TAKE 1 TABLET ONE TIME DAILY 7/11/25   Franky Taylor MD   apixaban (ELIQUIS) 2.5 mg Tab Take 2 tablets (5 mg total) by mouth 2 (two) times daily. 6/30/25   Franky Taylor MD   aspirin  (ECOTRIN) 81 MG EC tablet Take 81 mg by mouth once daily.    Provider, Historical   blood sugar diagnostic (ACCU-CHEK GUIDE TEST STRIPS) Strp 1 each by Other route 3 (three) times daily. Test Blood Sugar 12/5/24   Tripp Mohan MD   blood-glucose meter (ACCU-CHEK GUIDE GLUCOSE METER) [facility] Accucheck BID 12/15/22   Poli Gonzalez MD   brimonidine 0.2% (ALPHAGAN) 0.2 % Drop Place 1 drop into both eyes 2 (two) times a day. 11/6/24   Tripp Mohan MD   ferrous sulfate (IRON) 325 mg (65 mg iron) Tab tablet Take 1 tablet (325 mg total) by mouth every other day. 7/8/25 7/8/26  Olga Hampton MD   gabapentin (NEURONTIN) 100 MG capsule Take 2 capsules (200 mg total) by mouth every evening. 6/24/25   Reddy Parekh MD   glimepiride (AMARYL) 4 MG tablet Take 1 tablet (4 mg total) by mouth daily with breakfast. 3/4/25   Brandon Mccormack MD   HYDROcodone-acetaminophen (NORCO) 5-325 mg per tablet Take 1 tablet by mouth every 6 (six) hours as needed for Pain. 6/30/25   Caitlin Dyer, FNP-C   leuprolide acetate, 6 month, (LUPRON) 45 mg SyKt injection Inject 45 mg into the muscle every 6 (six) months. for 4 doses 2/26/25 8/21/26  Catarino Mota MD   levothyroxine (SYNTHROID) 25 MCG tablet Take 1 tablet (25 mcg total) by mouth before breakfast. 5/18/25 5/18/26  Diane Mcclelland MD   LIDOcaine (LIDODERM) 5 % Place 1 patch onto the skin once daily. Remove & Discard patch within 12 hours or as directed by MD 6/4/25   Poli Damon MD   metoprolol succinate (TOPROL-XL) 25 MG 24 hr tablet Take 0.5 tablets (12.5 mg total) by mouth once daily. 3/4/25 3/4/26  Jesse Alonzo MD   midodrine (PROAMATINE) 10 MG tablet Take 1 tablet (10 mg total) by mouth 3 (three) times daily. 6/25/25 6/25/26  Olga Hampton MD   pantoprazole (PROTONIX) 40 MG tablet Take 40 mg by mouth once daily.    Provider, Historical   pravastatin (PRAVACHOL) 40 MG tablet Take 1 tablet (40 mg total) by mouth once daily. 1/14/25 1/9/26  Marques  MD Tripp   silver sulfADIAZINE 1% (SILVADENE) 1 % cream Apply to affected area daily 6/18/25 6/18/26  Olga Hampton MD   tamsulosin (FLOMAX) 0.4 mg Cap Take 1 capsule (0.4 mg total) by mouth once daily. 11/15/24 11/15/25  Catarino Mota MD   vitamin renal formula, B-complex-vitamin c-folic acid, (NEPHROCAP) 1 mg Cap Take 1 capsule by mouth once daily. 6/18/25   Olga Hampton MD         Medications - Current  Scheduled Meds:   leuprolide acetate (6 month)  45 mg Intramuscular Q6 Months     Continuous Infusions:  PRN Meds:.      Allergies  Review of patient's allergies indicates:   Allergen Reactions    Atorvastatin     Rosuvastatin          Past Medical History  Past Medical History:   Diagnosis Date    Autonomic dysfunction 11/2023 11/2023 neuro note    Basal cell carcinoma     BPH (benign prostatic hyperplasia)     s/p TURP    CAD (coronary artery disease) 2017    status post PCI to mid LAD in 2017 (LHC in 2021 showed patent stent, otherwise nonobstructive CAD)    Cancer of prostate     s/p TURP    Chronic kidney disease     DDD (degenerative disc disease) 10/21/2013    Diabetes mellitus with renal complications     Disc disease, degenerative, cervical     DVT (deep venous thrombosis)     Encounter for blood transfusion     GERD (gastroesophageal reflux disease)     History of ulcerative colitis 1982    s/p colectomy and ileostomy    HLD (hyperlipidemia)     Ileostomy in place 1982    RBBB     Squamous cell carcinoma 03/08/2018    Left superior helix near insertion    Squamous cell carcinoma 04/12/2018    Left forearm x 5    Ventricular tachycardia          Past Surgical History  Past Surgical History:   Procedure Laterality Date    ABLATION, SVT, ACCESSORY PATHWAY N/A 04/30/2024    Procedure: Ablation, SVT, Accessory Pathway;  Surgeon: Franky Taylor MD;  Location: [facility] EP LAB;  Service: Cardiology;  Laterality: N/A;  VT, RFA, Carto, MAC, GP, 3 Prep *MDT ILR in situ*    cardiac stents      CATARACT  EXTRACTION Bilateral     CERVICAL FUSION      CHOLECYSTECTOMY N/A 02/14/2023    Procedure: CHOLECYSTECTOMY;  Surgeon: Mike Joyce MD;  Location: [facility] OR;  Service: General;  Laterality: N/A;  very high probabilty of converison to open    colectomy and ileostomy  1985    EGD, WITH CLOSED BIOPSY  04/04/2025    ENDOSCOPIC ULTRASOUND OF UPPER GASTROINTESTINAL TRACT N/A 02/10/2023    Procedure: ULTRASOUND, UPPER GI TRACT, ENDOSCOPIC;  Surgeon: Poli Fuller MD;  Location: [facility] ENDO;  Service: Endoscopy;  Laterality: N/A;    ERCP N/A 02/10/2023    Procedure: ERCP (ENDOSCOPIC RETROGRADE CHOLANGIOPANCREATOGRAPHY);  Surgeon: Poli Fuller MD;  Location: [facility] ENDO;  Service: Endoscopy;  Laterality: N/A;    ESOPHAGOGASTRODUODENOSCOPY N/A 4/4/2025    Procedure: EGD (ESOPHAGOGASTRODUODENOSCOPY);  Surgeon: Melania Gibson MD;  Location: [facility];  Service: Endoscopy;  Laterality: N/A;    EXCISION OF PAROTID GLAND Left 12/18/2020    Procedure: EXCISION, PAROTID GLAND;  Surgeon: Michael Pinzon MD;  [facility];  Service: ENT;  Laterality: Left;    INSERTION OF IMPLANTABLE LOOP RECORDER  06/07/2021    INSERTION OF IMPLANTABLE LOOP RECORDER Left 06/07/2021    Procedure: INSERTION, IMPLANTABLE LOOP RECORDER;  Surgeon: Romario Dao MD;  Location: [facility] [place] CATH LAB;  Service: Cardiology;  Laterality: Left;    INSERTION, CATHETER, TUNNELED N/A 5/27/2025    Procedure: Insertion,catheter,tunneled;  Surgeon: Jamie Stokes MD;  Location: [facility] CATH LAB;  Service: Radiology;  Laterality: N/A;    LEFT HEART CATHETERIZATION Right 04/15/2021    Procedure: CATHETERIZATION, HEART, LEFT;  Surgeon: Marcio Jones MD;  Location: [facility] [place] CATH LAB;  Service: Cardiology;  Laterality: Right;    LYSIS OF ADHESIONS N/A 11/09/2020    Procedure: LYSIS, ADHESIONS,  ERAS low;  Surgeon: CED Haley MD;  Location: [facility] OR 2ND FLR;  Service: Colon and Rectal;  Laterality: N/A;    LYSIS OF ADHESIONS N/A 02/14/2023     Procedure: LYSIS, ADHESIONS;  Surgeon: Mike Joyce MD;  Location: [facility];  Service: General;  Laterality: N/A;    POUCHOSCOPY N/A 04/06/2022    Procedure: ENDOSCOPY, POUCH, SMALL INTESTINE, DIAGNOSTIC;  Surgeon: Dieter Juarez MD;  Location: [facility] ENDO (2ND FLR);  Service: Endoscopy;  Laterality: N/A;    REPAIR, HERNIA, PARASTOMAL N/A 11/09/2020    Procedure: REPAIR, HERNIA, PARASTOMAL;  Surgeon: CED Haley MD;  Location: [facility] OR 2ND FLR;  Service: Colon and Rectal;  Laterality: N/A;    TRANSURETHRAL RESECTION OF PROSTATE (TURP) WITHOUT USE OF LASER N/A 01/23/2019    Procedure: TURP, WITHOUT USING LASER BIPOLAR;  Surgeon: Catarino Mota MD;  Location: [facility] 1ST FLR;  Service: Urology;  Laterality: N/A;  1.5 HOURS    TREATMENT OF CARDIAC ARRHYTHMIA  04/30/2024    Procedure: Cardioversion or Defibrillation;  Surgeon: Franky Taylor MD;  Location: [facility] EP LAB;  Service: Cardiology;;         Social History  Social History     Socioeconomic History    Marital status:     Number of children: 1   Occupational History    Occupation:  x 44 years     Comment: Retired    Occupation: [place]    Tobacco Use    Smoking status: Never     Passive exposure: Never    Smokeless tobacco: Never   Substance and Sexual Activity    Alcohol use: No    Drug use: No    Sexual activity: Not Currently     Partners: Female     Social Drivers of Health     Financial Resource Strain: Low Risk  (6/9/2025)    Overall Financial Resource Strain (CARDIA)     Difficulty of Paying Living Expenses: Not hard at all   Food Insecurity: No Food Insecurity (6/9/2025)    Hunger Vital Sign     Worried About Running Out of Food in the Last Year: Never true     Ran Out of Food in the Last Year: Never true   Transportation Needs: No Transportation Needs (6/9/2025)    PRAPARE - Transportation     Lack of Transportation (Medical): No     Lack of Transportation (Non-Medical): No   Physical Activity: Inactive  "(4/28/2025)    Exercise Vital Sign     Days of Exercise per Week: 0 days     Minutes of Exercise per Session: 0 min   Stress: No Stress Concern Present (6/9/2025)    [place] [employer] - Occupational Stress Questionnaire     Feeling of Stress : Only a little   Housing Stability: Unknown (6/9/2025)    Housing Stability Vital Sign     Unable to Pay for Housing in the Last Year: Patient unable to answer     Homeless in the Last Year: No         ROS  10 point ROS performed and negative except as stated in HPI     Physical Examination         Vital Signs             24 Hour VS Range    BP: ()/()   Arterial Line BP: ()/()   No intake or output data in the 24 hours ending 07/14/25 2054        Physical Exam:   Constitutional: no acute distress  HEENT: NCAT, EOMI, no scleral icterus  Cardiovascular: Regular rate and rhythm  Pulmonary: Normal respiratory effort   Abdomen: nontender, non-distended   Neuro: alert and oriented, no focal deficits  Extremities: warm, no edema   MSK: no deformities  Integument: intact, no rashes       Data       Recent Labs   Lab 07/08/25  1150   WBC 6.87   HGB 10.2*   HCT 33.8*           Recent Labs   Lab 07/08/25  1150   PROTIME 12.3   INR 1.1        Recent Labs   Lab 07/08/25  1150      K 4.9      CO2 26   BUN 24*   CREATININE 4.3*   ANIONGAP 9   CALCIUM 7.8*        No results for input(s): "PROT", "ALBUMIN", "BILITOT", "ALKPHOS", "AST", "ALT" in the last 168 hours.     No results for input(s): "TROPONINI" in the last 168 hours.     NT-proBNP (pg/mL)   Date Value   06/30/2025 25,992 (H)   06/26/2025 19,465 (H)     BNP (pg/mL)   Date Value   05/26/2025 1,253 (H)   05/17/2025 1,302 (H)   04/24/2025 422 (H)   02/25/2025 300 (H)   02/03/2025 2,750 (H)   01/24/2025 2,586 (H)   01/23/2025 2,356 (H)   01/18/2025 2,796 (H)       No results for input(s): "LABBLOO" in the last 168 hours.         Assessment & Plan   75 year old gentleman with PMH of prostate cancer " s/p TURP, SBO s/p colostomy, HTN, HLD, DM2, CAD s/p PCI to LAD 2017, AT s/p ablation 2024 and paroxysmal atrial fibrillation who [place] [facility] [place] with Dr. Taylor.    Atrial fibrillation:   Risks/benefits/alternatives discussed with patient and he agrees to proceed. Consents signed.   -To EP lab for PFA pulmonary vein isolation   -HUMBERTO prior to rule ou tLAA thrombus         Anson Bowers MD  [facility]   Electrophysiology PGY-VIII

## 2025-07-15 NOTE — TRANSFER OF CARE
"Anesthesia Transfer of Care Note    Patient: Jarrett Charlton Jr.    Procedure(s) Performed: Procedure(s) (LRB):  Ablation atrial fibrillation (N/A)  ECHOCARDIOGRAM, TRANSESOPHAGEAL (N/A)    Patient location: PACU    Anesthesia Type: general    Transport from OR: Transported from OR on 6-10 L/min O2 by face mask with adequate spontaneous ventilation    Post pain: adequate analgesia    Post assessment: tolerated procedure well and no apparent anesthetic complications    Post vital signs: stable    Level of consciousness: awake, alert and oriented    Nausea/Vomiting: no nausea/vomiting    Complications: none    Transfer of care protocol was followed    Last vitals: Visit Vitals  BP (!) 179/75 (BP Location: Right arm, Patient Position: Lying)   Pulse (!) 59   Temp 36.6 °C (97.9 °F) (Temporal)   Resp 18   Ht 6' 2" (1.88 m)   Wt 76.7 kg (169 lb)   SpO2 100%   BMI 21.70 kg/m²     "

## 2025-07-15 NOTE — ANESTHESIA PROCEDURE NOTES
Arterial    Diagnosis: paroxysmal atrial fibrillation    Patient location during procedure: done in OR  Timeout: 7/15/2025 9:51 AM  Procedure end time: 7/15/2025 9:52 AM    Staffing  Authorizing Provider: Rivas Alas MD  Performing Provider: Rivas Alas MD    Staffing  Performed by: Rivas Alas MD  Authorized by: Rivas Alas MD    Anesthesiologist was present at the time of the procedure.    Preanesthetic Checklist  Completed: patient identified, IV checked, site marked, risks and benefits discussed, surgical consent, monitors and equipment checked, pre-op evaluation, timeout performed and anesthesia consent givenArterial  Skin Prep: chlorhexidine gluconate  Local Infiltration: none  Orientation: left  Location: radial    Catheter Size: 20 G  Catheter placement by Anatomical landmarks. Heme positive aspiration all ports. Insertion Attempts: 1  Assessment  Dressing: secured with tape and tegaderm  Patient: Tolerated well

## 2025-07-15 NOTE — PLAN OF CARE
Patient arrived to room. PIV X 2 placed. Admit assessment completed. Plan of care discussed with patient. Daughter at bedside. Nurse call bell within reach. Will monitor

## 2025-07-15 NOTE — CONSULTS
[facility]  [place]UM[place]ERT[place] History and Physical      Jarrett Charlton Jr.  YOB: 1950  Medical Record Number:  [id number]  Attending Physician:  Franky Taylor MD   Date of Admission: 7/15/2025       Hospital Day:  0  Current Principal Problem:  <principal problem not specified>    Patient information was obtained from patient and past medical records.  History     Cc: Atrial fibrillation     HPI  Jarrett Charlton Jr. Is a 75 year old male with a  PMHx of prostate cancer s/p TURP, SBO s/p colostomy, HTN, HLD, DM2, CAD s/p PCI to LAD 2017, AT s/p ablation 2024 and paroxysmal atrial fibrillation who developed symptomatic AF 8/2024 and had prolonged episodes of AF on ILR. He has been scheduled for ablation however was rescheduled due to admission for hypotension. He has issues with labile blood pressures and is on midodrine for frequent dizzy spells/syncope. He presents today for PFA pulmonary vein isolation with Dr. Taylor.        Today, in good spirits. He denies any active cardiac complaints. Patient is accompanied by his daughter today.     Anticoagulant/antiplatelets: ASA 81 mg qd/Eliquis 5 mg BID  ECG: Sinus bradycardia at 57 bpm   Platelet count: 218  INR: 1.1    History of stroke:  TIA (patient reports hx mini stroke ~2-3 weeks ago)  Dysphagia or odynophagia:  no  Liver Disease, esophageal disease, or known varices:  no  Upper GI Bleeding:  no  Snoring:  no   Sleep Apnea:  no  Prior neck surgery or radiation:  no  History of anesthetic difficulties:  no  Family history of anesthetic difficulties:  no  Last oral intake: last pm   Able to move neck in all directions:  yes  Use of GLP-1:  no      Medications - Outpatient  Prior to Admission medications    Medication Sig Start Date End Date Taking? Authorizing Provider   allopurinoL (ZYLOPRIM) 100 MG tablet Take 1 tablet (100 mg total) by mouth once daily. 6/12/25 8/11/25 Yes Laurence Arguello PA-C   aspirin (ECOTRIN) 81 MG EC tablet  Take 81 mg by mouth once daily.   Yes Provider, Historical   brimonidine 0.2% (ALPHAGAN) 0.2 % Drop Place 1 drop into both eyes 2 (two) times a day. 11/6/24  Yes Tripp Mohan MD   ferrous sulfate (IRON) 325 mg (65 mg iron) Tab tablet Take 1 tablet (325 mg total) by mouth every other day. 7/8/25 7/8/26 Yes Olga Hampton MD   gabapentin (NEURONTIN) 100 MG capsule Take 2 capsules (200 mg total) by mouth every evening. 6/24/25  Yes Reddy Parekh MD   glimepiride (AMARYL) 4 MG tablet Take 1 tablet (4 mg total) by mouth daily with breakfast. 3/4/25  Yes Brandon Mccormack MD   HYDROcodone-acetaminophen (NORCO) 5-325 mg per tablet Take 1 tablet by mouth every 6 (six) hours as needed for Pain. 6/30/25  Yes Caitlin Dyer, FNP-C   levothyroxine (SYNTHROID) 25 MCG tablet Take 1 tablet (25 mcg total) by mouth before breakfast. 5/18/25 5/18/26 Yes Diane Mcclelland MD   LIDOcaine (LIDODERM) 5 % Place 1 patch onto the skin once daily. Remove & Discard patch within 12 hours or as directed by MD 6/4/25  Yes Poli Damon MD   metoprolol succinate (TOPROL-XL) 25 MG 24 hr tablet Take 0.5 tablets (12.5 mg total) by mouth once daily. 3/4/25 3/4/26 Yes Jesse Alonzo MD   midodrine (PROAMATINE) 10 MG tablet Take 1 tablet (10 mg total) by mouth 3 (three) times daily. 6/25/25 6/25/26 Yes Olga Hampton MD   pantoprazole (PROTONIX) 40 MG tablet Take 40 mg by mouth once daily.   Yes Provider, Historical   pravastatin (PRAVACHOL) 40 MG tablet Take 1 tablet (40 mg total) by mouth once daily. 1/14/25 1/9/26 Yes Tripp Mohan MD   tamsulosin (FLOMAX) 0.4 mg Cap Take 1 capsule (0.4 mg total) by mouth once daily. 11/15/24 11/15/25 Yes Catarino Mota MD   vitamin renal formula, B-complex-vitamin c-folic acid, (NEPHROCAP) 1 mg Cap Take 1 capsule by mouth once daily. 6/18/25  Yes Olga Hampton MD   ACCU-CHEK SOFTCLIX LANCETS Misc TEST BLOOD SUGAR THREE TIMES DAILY 12/5/24   Tripp Mohan MD   alcohol swabs  (DROPSAFE ALCOHOL PREP PADS) PadM Apply 1 each topically once daily. 12/5/24   Tripp Mohan MD   amiodarone (PACERONE) 200 MG Tab TAKE 1 TABLET ONE TIME DAILY 7/11/25   Franky Taylor MD   apixaban (ELIQUIS) 2.5 mg Tab Take 2 tablets (5 mg total) by mouth 2 (two) times daily. 6/30/25   Franky Taylor MD   blood sugar diagnostic (ACCU-CHEK GUIDE TEST STRIPS) Strp 1 each by Other route 3 (three) times daily. Test Blood Sugar 12/5/24   Tripp Mohan MD   blood-glucose meter (ACCU-CHEK GUIDE GLUCOSE METER) [facility] [place] BID 12/15/22   Poli Gonzalez MD   leuprolide acetate, 6 month, (LUPRON) 45 mg SyKt injection Inject 45 mg into the muscle every 6 (six) months. for 4 doses 2/26/25 8/21/26  Catarino Mota MD   silver sulfADIAZINE 1% (SILVADENE) 1 % cream Apply to affected area daily 6/18/25 6/18/26  Olga Hampton MD       Medications - Current  Scheduled Meds:  Continuous Infusions:  PRN Meds:.      Allergies  Review of patient's allergies indicates:   Allergen Reactions    Atorvastatin     Rosuvastatin        Past Medical History  Past Medical History:   Diagnosis Date    Autonomic dysfunction 11/2023 11/2023 neuro note    Basal cell carcinoma     BPH (benign prostatic hyperplasia)     s/p TURP    CAD (coronary artery disease) 2017    status post PCI to mid LAD in 2017 ([facility] in 2021 showed patent stent, otherwise nonobstructive CAD)    Cancer of prostate     s/p TURP    Chronic kidney disease     DDD (degenerative disc disease) 10/21/2013    Diabetes mellitus with renal complications     Disc disease, degenerative, cervical     DVT (deep venous thrombosis)     Encounter for blood transfusion     GERD (gastroesophageal reflux disease)     History of ulcerative colitis 1982    s/p colectomy and ileostomy    HLD (hyperlipidemia)     Ileostomy in place 1982    RBBB     Squamous cell carcinoma 03/08/2018    Left superior helix near insertion    Squamous cell carcinoma 04/12/2018    Left forearm x 5     Ventricular tachycardia        Past Surgical History  Past Surgical History:   Procedure Laterality Date    ABLATION, SVT, ACCESSORY PATHWAY N/A 04/30/2024    Procedure: Ablation, SVT, Accessory Pathway;  Surgeon: Franky Taylor MD;  Location: [facility] EP LAB;  Service: Cardiology;  Laterality: N/A;  VT, RFA, Carto, MAC, GP, 3 Prep *MDT ILR in situ*    cardiac stents      CATARACT EXTRACTION Bilateral     CERVICAL FUSION      CHOLECYSTECTOMY N/A 02/14/2023    Procedure: CHOLECYSTECTOMY;  Surgeon: Mike Joyce MD;  Location: [facility];  Service: General;  Laterality: N/A;  very high probabilty of converison to open    colectomy and ileostomy  1985    EGD, WITH CLOSED BIOPSY  04/04/2025    ENDOSCOPIC ULTRASOUND OF UPPER GASTROINTESTINAL TRACT N/A 02/10/2023    Procedure: ULTRASOUND, UPPER GI TRACT, ENDOSCOPIC;  Surgeon: Poli Fuller MD;  Location: [facility] ENDO;  Service: Endoscopy;  Laterality: N/A;    ERCP N/A 02/10/2023    Procedure: ERCP (ENDOSCOPIC RETROGRADE CHOLANGIOPANCREATOGRAPHY);  Surgeon: Poli Fuller MD;  Location: [facility];  Service: Endoscopy;  Laterality: N/A;    ESOPHAGOGASTRODUODENOSCOPY N/A 4/4/2025    Procedure: EGD (ESOPHAGOGASTRODUODENOSCOPY);  Surgeon: Melania Gibson MD;  Location: [facility];  Service: Endoscopy;  Laterality: N/A;    EXCISION OF PAROTID GLAND Left 12/18/2020    Procedure: EXCISION, PAROTID GLAND;  Surgeon: Michael Pinzon MD;  Location: [address];  Service: ENT;  Laterality: Left;    INSERTION OF IMPLANTABLE LOOP RECORDER  06/07/2021    INSERTION OF IMPLANTABLE LOOP RECORDER Left 06/07/2021    Procedure: INSERTION, IMPLANTABLE LOOP RECORDER;  Surgeon: Romario Dao MD;  Location: [facility] CATH LAB;  Service: Cardiology;  Laterality: Left;    INSERTION, CATHETER, TUNNELED N/A 5/27/2025    Procedure: Insertion,catheter,tunneled;  Surgeon: Jamie Stokes MD;  Location: [facility] CATH LAB;  Service: Radiology;  Laterality: N/A;    LEFT HEART CATHETERIZATION Right  04/15/2021    Procedure: CATHETERIZATION, HEART, LEFT;  Surgeon: Marcio Jones MD;  Location: [facility] CATH LAB;  Service: Cardiology;  Laterality: Right;    LYSIS OF ADHESIONS N/A 11/09/2020    Procedure: LYSIS, ADHESIONS,  ERAS low;  Surgeon: CED Haley MD;  Location: [facility] OR 2ND FLR;  Service: Colon and Rectal;  Laterality: N/A;    LYSIS OF ADHESIONS N/A 02/14/2023    Procedure: LYSIS, ADHESIONS;  Surgeon: Mike Joyce MD;  Location: [facility] OR;  Service: General;  Laterality: N/A;    POUCHOSCOPY N/A 04/06/2022    Procedure: ENDOSCOPY, POUCH, SMALL INTESTINE, DIAGNOSTIC;  Surgeon: Dieter Juarez MD;  Location: [facility] ENDO (2ND FLR);  Service: Endoscopy;  Laterality: N/A;    REPAIR, HERNIA, PARASTOMAL N/A 11/09/2020    Procedure: REPAIR, HERNIA, PARASTOMAL;  Surgeon: CED Haley MD;  Location: [facility] OR 2ND FLR;  Service: Colon and Rectal;  Laterality: N/A;    TRANSURETHRAL RESECTION OF PROSTATE (TURP) WITHOUT USE OF LASER N/A 01/23/2019    Procedure: TURP, WITHOUT USING LASER BIPOLAR;  Surgeon: Catarino Mota MD;  Location: [facility] 1ST FLR;  Service: Urology;  Laterality: N/A;  1.5 HOURS    TREATMENT OF CARDIAC ARRHYTHMIA  04/30/2024    Procedure: Cardioversion or Defibrillation;  Surgeon: Franky Taylor MD;  Location: [facility] EP LAB;  Service: Cardiology;;       Social History  Social History     Socioeconomic History    Marital status:     Number of children: 1   Occupational History    Occupation:  x 44 years     Comment: Retired    Occupation: [place]    Tobacco Use    Smoking status: Never     Passive exposure: Never    Smokeless tobacco: Never   Substance and Sexual Activity    Alcohol use: No    Drug use: No    Sexual activity: Not Currently     Partners: Female     Social Drivers of Health     Financial Resource Strain: Low Risk  (6/9/2025)    Overall Financial Resource Strain (CARDIA)     Difficulty of Paying Living Expenses: Not hard at all   Food  "Insecurity: No Food Insecurity (6/9/2025)    Hunger Vital Sign     Worried About Running Out of Food in the Last Year: Never true     Ran Out of Food in the Last Year: Never true   Transportation Needs: No Transportation Needs (6/9/2025)    PRAPARE - Transportation     Lack of Transportation (Medical): No     Lack of Transportation (Non-Medical): No   Physical Activity: Inactive (4/28/2025)    Exercise Vital Sign     Days of Exercise per Week: 0 days     Minutes of Exercise per Session: 0 min   Stress: No Stress Concern Present (6/9/2025)    [facility]ational [employer] - Occupational Stress Questionnaire     Feeling of Stress : Only a little   Housing Stability: Unknown (6/9/2025)    Housing Stability Vital Sign     Unable to Pay for Housing in the Last Year: Patient unable to answer     Homeless in the Last Year: No       ROS  10 point ROS performed and negative except as stated in HPI     Physical Examination     Vital Signs  24 Hour VS Range    Temp:  [97.9 °F (36.6 °C)]   Pulse:  [59]   Resp:  [18]   BP: (179-181)/(75-85)   SpO2:  [100 %]   No intake or output data in the 24 hours ending 07/15/25 0849      Physical Exam:   Constitutional: no acute distress  HEENT: NCAT, EOMI, no scleral icterus  Cardiovascular: Regular rate and rhythm   Pulmonary: Normal respiratory effort   Abdomen: nontender, non-distended   Neuro: alert and oriented, no focal deficits  Extremities: warm, no edema   MSK: no deformities  Integument: intact, no rashes     Data       Recent Labs   Lab 07/08/25  1150   WBC 6.87   HGB 10.2*   HCT 33.8*           Recent Labs   Lab 07/08/25  1150   PROTIME 12.3   INR 1.1        Recent Labs   Lab 07/08/25  1150      K 4.9      CO2 26   BUN 24*   CREATININE 4.3*   ANIONGAP 9   CALCIUM 7.8*        No results for input(s): "PROT", "ALBUMIN", "BILITOT", "ALKPHOS", "AST", "ALT" in the last 168 hours.     No results for input(s): "TROPONINI" in the last 168 hours. " "    NT-proBNP (pg/mL)   Date Value   06/30/2025 25,992 (H)   06/26/2025 19,465 (H)     BNP (pg/mL)   Date Value   05/26/2025 1,253 (H)   05/17/2025 1,302 (H)   04/24/2025 422 (H)   02/25/2025 300 (H)   02/03/2025 2,750 (H)   01/24/2025 2,586 (H)   01/23/2025 2,356 (H)   01/18/2025 2,796 (H)       No results for input(s): "LABBLOO" in the last 168 hours.     Assessment & Plan     #Atrial fibrillation   -patient presents for PFA PVI ablation procedure with Dr. Taylor   -on Eliquis for CVA prophylaxis, last dose yesterday evening as advised per EP      Last TTE 05/26/25    Left Ventricle: The left ventricle is normal in size. Mildly increased ventricular mass. Mildly increased wall thickness. There is mild eccentric hypertrophy. There is normal systolic function. Ejection fraction is approximately 60%. Global longitudinal strain is reduced mildly reduced measuring -15.6% Grade I diastolic dysfunction. Normal left ventricular filling pressure. Tissue Doppler velocity is reduced.    Right Ventricle: The right ventricle is mildly dilated Wall thickness is normal. Systolic function is normal.    Left Atrium: The left atrium is moderately dilated.    Right Atrium: The right atrium is mildly dilated .    Aortic Valve: The aortic valve is a trileaflet valve. There is mild aortic valve sclerosis.    Mitral Valve: There is mild regurgitation with a centrally directed jet.    Tricuspid Valve: There is mild regurgitation.    Pulmonary Artery: There is severe pulmonary hypertension. The estimated pulmonary artery systolic pressure is 73 mmHg.    IVC/SVC: Elevated venous pressure at 15 mmHg    TTE 02/26/25    Left Ventricle: The left ventricle is normal in size. Normal wall thickness. There is concentric hypertrophy. Severe global hypokinesis present. There is moderately reduced systolic function with a visually estimated ejection fraction of 30 - 35%. Quantitated ejection fraction is 30%. There is indeterminate diastolic function. " No thrombus.    Right Ventricle: The right ventricle is normal in size. Wall thickness is normal. Systolic function is normal.    Left Atrium: The left atrium is mildly dilated.    Mitral Valve: There is mild to moderate regurgitation with a centrally directed jet.    Tricuspid Valve: There is mild regurgitation with a centrally directed jet.    Pulmonary Artery: The estimated pulmonary artery systolic pressure is 33 mmHg.    IVC/SVC: Intermediate venous pressure at 8 mmHg.    -No absolute contraindications of esophageal stricture, tumor, perforation, laceration,or diverticulum and/or active GI bleed.  -The risks, benefits & alternatives of the procedure were explained to the patient.   -The risks of transesophageal echo include but are not limited to:  Dental trauma, esophageal trauma/perforation, bleeding, laryngospasm/brochospasm, aspiration, sore throat/hoarseness, & dislodgement of the endotracheal tube/nasogastric tube (where applicable).    -The risks of moderate sedation include hypotension, respiratory depression, arrhythmias, bronchospasm, & death.    -Informed consent was obtained. The patient is agreeable to proceed with the procedure and all questions and concerns addressed.    Case was discussed with an attending physician in echocardiography lab prior to procedure.    Holly Valenzuela PA-C  [facility]

## 2025-07-15 NOTE — PLAN OF CARE
Sutures removed from bilateral groins and sites dressed with gauze and Tegaderm. Patient tolerated well. HOB elevated 30 degrees. VSS. NADN.

## 2025-07-15 NOTE — PLAN OF CARE
Received report from GHASSAN Vazquez. Patient s/p ablation, AAOx3. VSS, no c/o pain or discomfort at this time, resp even and unlabored. Sutures and stopcocks to bilateral groins are CRISTI. No active bleeding. No hematoma noted. Post procedure protocol reviewed with patient and patient's family. Understanding verbalized. Family members at bedside. Nurse call bell within reach.

## 2025-07-15 NOTE — NURSING TRANSFER
Nursing Transfer Note      7/15/2025   2:16 PM    Nurse giving handoff: George FERRELL EP PACU  Nurse receiving handoff: Deidra SSCU    Reason patient is being transferred: recovered from anesthesia    Transfer To: SSCU  3    Transfer via stretcher    Transfer with cardiac monitoring    Transported by RN x1    Telemetry: Box Number [id number], Rate 58, Rhythm sinus velvet, and Telemetry  Erni  Order for Tele Monitor? Yes    Any special needs or follow-up needed: routine    Patient belongings transferred with patient: none    Chart send with patient: Yes    Notified: family via surgical texting system, daughter via phone    Patient reassessed at: 7/15/2025 at 1400  1  Upon arrival to floor: cardiac monitor applied, patient oriented to room, and bed in lowest position

## 2025-07-15 NOTE — ANESTHESIA PROCEDURE NOTES
Intubation    Date/Time: 7/15/2025 9:51 AM    Performed by: Ivette Spencer CRNA  Authorized by: Rivas Alas MD    Intubation:     Induction:  Intravenous    Intubated:  Postinduction    Mask Ventilation:  Easy mask    Attempts:  1    Attempted By:  CRNA    Method of Intubation:  Video laryngoscopy    Blade:  Fonseca 3    Laryngeal View Grade: Grade I - full view of cords      Difficult Airway Encountered?: No      Complications:  None    Airway Device:  Oral endotracheal tube    Airway Device Size:  7.5    Style/Cuff Inflation:  Cuffed (inflated to minimal occlusive pressure)    Tube secured:  23    Secured at:  The teeth    Placement Verified By:  Capnometry    Complicating Factors:  None    Findings Post-Intubation:  BS equal bilateral and atraumatic/condition of teeth unchanged

## 2025-07-15 NOTE — ANESTHESIA POSTPROCEDURE EVALUATION
Anesthesia Post Evaluation    Patient: Jarrett Charlton Jr.    Procedure(s) Performed: Procedure(s) (LRB):  Ablation atrial fibrillation (N/A)  ECHOCARDIOGRAM, TRANSESOPHAGEAL (N/A)    Final Anesthesia Type: general      Patient location during evaluation: PACU  Patient participation: Yes- Able to Participate  Level of consciousness: awake and alert  Post-procedure vital signs: reviewed and stable  Pain management: adequate  Airway patency: patent    PONV status: None or treated.  Anesthetic complications: no      Cardiovascular status: hemodynamically stable  Respiratory status: spontaneous ventilation  Hydration status: euvolemic  Follow-up not needed.          Vitals Value Taken Time   /83 07/15/25 15:00   Temp 36.1 °C (97 °F) 07/15/25 14:30   Pulse 64 07/15/25 15:00   Resp 16 07/15/25 14:30   SpO2 99 % 07/15/25 14:18   Vitals shown include unfiled device data.      No case tracking events are documented in the log.      Pain/Jamie Score: Jamie Score: 9 (7/15/2025  1:15 PM)

## 2025-07-15 NOTE — ANESTHESIA PREPROCEDURE EVALUATION
07/15/2025  Jarrett Charlton Jr. is a 75 y.o., male for Atrial Fibrillation Ablation, PVI      Pre-op Assessment    I have reviewed the Patient Summary Reports.       I have reviewed the Medications.     Review of Systems  Anesthesia Hx:  No problems with previous Anesthesia               Denies Personal Hx of Anesthesia complications.                    Social:  Non-Smoker       Hematology/Oncology:  Hematology Normal   Oncology Normal                                   EENT/Dental:  EENT/Dental Normal           Cardiovascular:     Hypertension   CAD    Dysrhythmias   CHF        Severe Pulmonary HTN  - PASP est 73 mmHg  EF 65%,      Coronary Artery Disease:                            Hypertension     Disorder of Cardiac Rhythm     Pulmonary:  Pneumonia                  Pulmonary Infection:  Pneumonia.     Renal/:  Chronic Renal Disease, ESRD        Kidney Function/Disease             Hepatic/GI:     GERD                Musculoskeletal:  Arthritis               Neurological:    Neuromuscular Disease,  Headaches      Dx of Headaches                         Neuromuscular Disease   Endocrine:  Diabetes Hypothyroidism   Diabetes                      Dermatological:  Skin Normal    Psych:  Psychiatric History                Physical Exam  General: Alert and Oriented    Airway:  Mallampati: II / II  Mouth Opening: Normal  TM Distance: Normal  Tongue: Normal  Neck ROM: Normal ROM  No airway management difficulties anticipated  Dental:  Intact  No active dental issues noted  Chest/Lungs:  Clear to auscultation    Heart:  Rate: Normal  Rhythm: Regular Rhythm  Sounds: Normal      Anesthesia Plan  Type of Anesthesia, risks & benefits discussed:    Anesthesia Type: Gen ETT  Intra-op Monitoring Plan: Standard ASA Monitors and Art Line  Post Op Pain Control Plan: multimodal analgesia  Airway Plan: Direct  Informed  Consent: Informed consent signed with the Patient and all parties understand the risks and agree with anesthesia plan.  All questions answered.   ASA Score: 4  Anesthesia Plan Notes: Chart reviewed. Patient seen and examined. Anesthesia plan discussed and questions answered. E-consent signed. Tripp Gale MD    Ready For Surgery From Anesthesia Perspective.     .

## 2025-07-15 NOTE — DISCHARGE SUMMARY
[facility] - Cardiology  Cardiac Electrophysiology  Discharge Summary        Patient Name: Jarrett Charlton Jr.   MRN: [id number]   Admission Date: 7/15/2025    Hospital Length of Stay: 0   Discharge Date: 07/15/2025   Attending Physician: Franky Taylor MD   Discharging Provider: Anson Bowers MD      HPI:   Mr Karla Charlton is a 75 year old gentleman with PMH of prostate cancer s/p TURP, SBO s/p colostomy, HTN, HLD, DM2, CAD s/p PCI to LAD 2017, AT s/p ablation 2024 and paroxysmal atrial fibrillation who presents to [facility] [place] for PFA pulmonary vein isolation with Dr. Taylor. Developed symptomatic AF 8/2024 and had prolonged episodes of AF on ILR. He has been scheduled for ablation however was rescheduled due to admission for hypotension. He has issues with labile blood pressures and is on midodrine for frequent dizzy spells/syncope. He was rescheduled and presents today for PFA PVI. Notably during a recent admission for falls he was found incidentally to have a small acute CVA on brain MRI.         Presenting EKG: NSR with iRBBB   CHADS-VASc: 7 (age, HTN, DM, CAD, ?CVA)   Anticoagulants: Apixaban 2.5 mg BID   Antiarrhythmics: Amiodarone 200 mg daily   LV EF: 60% (TTE 5/2025)   Pertinent labs: Hgb 10.2, INR 1.1, Cr 4.3     Hospital Course:   Mr Charlton underwent successful pulmonary vein isolation with PFA. He tolerated the procedure well with no immediate complications. He completed bed rest and ambulated without bleeding/hematoma. He was discharged home in stable condition.     Follow up:   Follow up with Dr. Taylor in 3 months     Disposition:   Home or Self Care         Medication List        PAUSE taking these medications      glimepiride 4 MG tablet  Wait to take this until your doctor or other care provider tells you to start again.  FOLLOW UP WITH PCP OR ENDOCRINOLOGY TO DISCUSS RESTARTING THIS MEDICATION   Commonly known as: AMARYL  Take 1 tablet (4 mg total) by mouth daily with breakfast.            CONTINUE taking  these medications      ACCU-CHEK SOFTCLIX LANCETS Misc  Generic drug: lancets  TEST BLOOD SUGAR THREE TIMES DAILY     alcohol swabs Padm  Commonly known as: DROPSAFE ALCOHOL PREP PADS  Apply 1 each topically once daily.     allopurinoL 100 MG tablet  Commonly known as: ZYLOPRIM  Take 1 tablet (100 mg total) by mouth once daily.     amiodarone 200 MG Tab  Commonly known as: PACERONE  TAKE 1 TABLET ONE TIME DAILY     apixaban 2.5 mg Tab  Commonly known as: ELIQUIS  Take 2 tablets (5 mg total) by mouth 2 (two) times daily.     aspirin 81 MG EC tablet  Commonly known as: ECOTRIN     blood sugar diagnostic Strp  Commonly known as: ACCU-CHEK GUIDE TEST STRIPS  1 each by Other route 3 (three) times daily. Test Blood Sugar     blood-glucose meter Misc  Commonly known as: ACCU-CHEK GUIDE GLUCOSE METER  Accucheck BID     brimonidine 0.2% 0.2 % Drop  Commonly known as: ALPHAGAN  Place 1 drop into both eyes 2 (two) times a day.     ferrous sulfate 325 mg (65 mg iron) Tab tablet  Commonly known as: IRON  Take 1 tablet (325 mg total) by mouth every other day.     gabapentin 100 MG capsule  Commonly known as: NEURONTIN  Take 2 capsules (200 mg total) by mouth every evening.     HYDROcodone-acetaminophen 5-325 mg per tablet  Commonly known as: NORCO  Take 1 tablet by mouth every 6 (six) hours as needed for Pain.     leuprolide acetate (6 month) 45 mg Sykt injection  Commonly known as: LUPRON  Inject 45 mg into the muscle every 6 (six) months. for 4 doses     levothyroxine 25 MCG tablet  Commonly known as: SYNTHROID  Take 1 tablet (25 mcg total) by mouth before breakfast.     LIDOcaine 5 %  Commonly known as: LIDODERM  Place 1 patch onto the skin once daily. Remove & Discard patch within 12 hours or as directed by MD     metoprolol succinate 25 MG 24 hr tablet  Commonly known as: TOPROL-XL  Take 0.5 tablets (12.5 mg total) by mouth once daily.     midodrine 10 MG tablet  Commonly known as: PROAMATINE  Take 1 tablet (10 mg total) by  mouth 3 (three) times daily.     pantoprazole 40 MG tablet  Commonly known as: PROTONIX     pravastatin 40 MG tablet  Commonly known as: PRAVACHOL  Take 1 tablet (40 mg total) by mouth once daily.     silver sulfADIAZINE 1% 1 % cream  Commonly known as: SILVADENE  Apply to affected area daily     tamsulosin 0.4 mg Cap  Commonly known as: FLOMAX  Take 1 capsule (0.4 mg total) by mouth once daily.     vitamin renal formula (B-complex-vitamin c-folic acid) 1 mg Cap  Commonly known as: NEPHROCAP  Take 1 capsule by mouth once daily.              Anson Bowers MD  [facility]  Electrophysiology, PGY-VIII

## 2025-07-16 ENCOUNTER — TELEPHONE (OUTPATIENT)
Dept: NEUROLOGY | Facility: CLINIC | Age: 75
End: 2025-07-16
Payer: MEDICARE

## 2025-07-16 LAB — POCT GLUCOSE: 105 MG/DL (ref 70–110)

## 2025-07-16 NOTE — TELEPHONE ENCOUNTER
Copied from CRM #[id number]. Topic: Appointments - Appointment Access  >> Jul 16, 2025 10:46 AM Luann wrote:  Type:Reschedule Appt          Name of Caller:Latisha (Daughter)    Reschedule appt request:Latisha called wld like to resche Dad's appt unable to do so. Please advise     Would the patient rather a call back or a response via [facility]? Both    Best Call Back Number:[phone]    Additional Information:

## 2025-07-17 ENCOUNTER — PATIENT MESSAGE (OUTPATIENT)
Dept: VASCULAR SURGERY | Facility: CLINIC | Age: 75
End: 2025-07-17
Payer: MEDICARE

## 2025-07-18 ENCOUNTER — OFFICE VISIT (OUTPATIENT)
Dept: CARDIOLOGY | Facility: CLINIC | Age: 75
End: 2025-07-18
Payer: MEDICARE

## 2025-07-18 ENCOUNTER — OFFICE VISIT (OUTPATIENT)
Dept: ORTHOPEDICS | Facility: CLINIC | Age: 75
End: 2025-07-18
Payer: MEDICARE

## 2025-07-18 VITALS
DIASTOLIC BLOOD PRESSURE: 76 MMHG | RESPIRATION RATE: 16 BRPM | SYSTOLIC BLOOD PRESSURE: 152 MMHG | HEART RATE: 58 BPM | OXYGEN SATURATION: 97 % | HEIGHT: 74 IN | WEIGHT: 172.81 LBS | BODY MASS INDEX: 22.18 KG/M2

## 2025-07-18 VITALS — HEIGHT: 74 IN | WEIGHT: 173.81 LBS | BODY MASS INDEX: 22.31 KG/M2

## 2025-07-18 DIAGNOSIS — E78.5 HYPERLIPIDEMIA, UNSPECIFIED HYPERLIPIDEMIA TYPE: ICD-10-CM

## 2025-07-18 DIAGNOSIS — I48.0 PAF (PAROXYSMAL ATRIAL FIBRILLATION): ICD-10-CM

## 2025-07-18 DIAGNOSIS — I27.20 PULMONARY HYPERTENSION: ICD-10-CM

## 2025-07-18 DIAGNOSIS — Z99.2 ESRD (END STAGE RENAL DISEASE) ON DIALYSIS: ICD-10-CM

## 2025-07-18 DIAGNOSIS — N18.32 TYPE 2 DIABETES MELLITUS WITH STAGE 3B CHRONIC KIDNEY DISEASE, WITHOUT LONG-TERM CURRENT USE OF INSULIN: ICD-10-CM

## 2025-07-18 DIAGNOSIS — I45.2 BIFASCICULAR BLOCK: ICD-10-CM

## 2025-07-18 DIAGNOSIS — I25.10 CORONARY ARTERY DISEASE INVOLVING NATIVE CORONARY ARTERY OF NATIVE HEART WITHOUT ANGINA PECTORIS: ICD-10-CM

## 2025-07-18 DIAGNOSIS — I10 ESSENTIAL HYPERTENSION: Primary | ICD-10-CM

## 2025-07-18 DIAGNOSIS — M71.21 SYNOVIAL CYST OF RIGHT POPLITEAL SPACE: ICD-10-CM

## 2025-07-18 DIAGNOSIS — M71.21 BAKER'S CYST OF KNEE, RIGHT: ICD-10-CM

## 2025-07-18 DIAGNOSIS — N18.6 ESRD (END STAGE RENAL DISEASE) ON DIALYSIS: ICD-10-CM

## 2025-07-18 DIAGNOSIS — E11.22 TYPE 2 DIABETES MELLITUS WITH STAGE 3B CHRONIC KIDNEY DISEASE, WITHOUT LONG-TERM CURRENT USE OF INSULIN: ICD-10-CM

## 2025-07-18 DIAGNOSIS — M17.11 PRIMARY OSTEOARTHRITIS OF RIGHT KNEE: Primary | ICD-10-CM

## 2025-07-18 PROCEDURE — 99999 PR PBB SHADOW E&M-EST. PATIENT-LVL V: CPT | Mod: PBBFAC,,, | Performed by: INTERNAL MEDICINE

## 2025-07-18 PROCEDURE — 99999 PR PBB SHADOW E&M-EST. PATIENT-LVL V: CPT | Mod: PBBFAC,,,

## 2025-07-18 RX ORDER — BETAMETHASONE SODIUM PHOSPHATE AND BETAMETHASONE ACETATE 3; 3 MG/ML; MG/ML
6 INJECTION, SUSPENSION INTRA-ARTICULAR; INTRALESIONAL; INTRAMUSCULAR; SOFT TISSUE
Status: COMPLETED | OUTPATIENT
Start: 2025-07-18 | End: 2025-07-18

## 2025-07-18 RX ORDER — METOPROLOL SUCCINATE 25 MG/1
25 TABLET, EXTENDED RELEASE ORAL DAILY
Start: 2025-07-18 | End: 2026-07-18

## 2025-07-18 RX ADMIN — BETAMETHASONE SODIUM PHOSPHATE AND BETAMETHASONE ACETATE 6 MG: 3; 3 INJECTION, SUSPENSION INTRA-ARTICULAR; INTRALESIONAL; INTRAMUSCULAR; SOFT TISSUE at 08:07

## 2025-07-18 NOTE — PROGRESS NOTES
[facility] Cardiology     Subjective:    Patient ID:  Jarrett Charlton Jr. is a 75 y.o. male who presents for follow-up of Coronary Artery Disease, Atrial Fibrillation, ESRD, Diabetes Mellitus, and Hyperlipidemia    Review of patient's allergies indicates:   Allergen Reactions    Atorvastatin     Rosuvastatin      He had a stress test May 2025 showing EF 49% with no ischemia possible fixed inferior wall defect.  His history includes previous LAD stent in 2017.  A 2021 heart catheterization confirmed patent stent without obstructive coronary disease.  He does not get angina.    He did develop cardiomyopathy related to AFib.  His EF has stabilized.  He had ablation last week.  He is on low-dose Eliquis, he is on aspirin.  No bleeding problems noted.  He is in need of fistula formation for dialysis as well as neck surgery for spine disease.  EP has recommended he wait because of recent ablation in preference to not interrupt Eliquis therapy at this time.  He does have numbness in his hands and significant neck pain.    He started hemodialysis a month ago.  He has an indwelling catheter.  He is no longer on midodrine.  He monitors his blood pressures at home.  He states that they are rarely above 140.  Today's blood pressure 152/76.  Some of his blood pressures are quite normal.  He is on Toprol 25 mg.  He states it dialysis sometimes his blood pressure is high.         Review of Systems   Constitutional: Negative for chills, decreased appetite, diaphoresis, fever, malaise/fatigue, night sweats, weight gain and weight loss.   HENT:  Negative for congestion, ear discharge, ear pain, hearing loss, hoarse voice, nosebleeds, odynophagia, sore throat, stridor and tinnitus.    Eyes:  Negative for blurred vision, discharge, double vision, pain, photophobia, redness, vision loss in left eye, vision loss in right eye, visual disturbance and visual halos.    Cardiovascular:  Positive for leg swelling. Negative for chest pain, claudication, cyanosis, dyspnea on exertion, irregular heartbeat, near-syncope, orthopnea, palpitations, paroxysmal nocturnal dyspnea and syncope.   Respiratory:  Positive for shortness of breath. Negative for cough, hemoptysis, sleep disturbances due to breathing, snoring, sputum production and wheezing.    Endocrine: Negative for cold intolerance, heat intolerance, polydipsia, polyphagia and polyuria.   Hematologic/Lymphatic: Negative for adenopathy and bleeding problem. Does not bruise/bleed easily.   Skin:  Negative for color change, dry skin, flushing, itching, nail changes, poor wound healing, rash, skin cancer, suspicious lesions and unusual hair distribution.   Musculoskeletal:  Positive for joint pain and neck pain. Negative for arthritis, back pain, falls, gout, joint swelling, muscle cramps, muscle weakness, myalgias and stiffness.   Gastrointestinal:  Negative for bloating, abdominal pain, anorexia, change in bowel habit, bowel incontinence, constipation, diarrhea, dysphagia, excessive appetite, flatus, heartburn, hematemesis, hematochezia, hemorrhoids, jaundice, melena, nausea and vomiting.   Genitourinary:  Negative for bladder incontinence, decreased libido, dysuria, flank pain, frequency, genital sores, hematuria, hesitancy, incomplete emptying, nocturia and urgency.   Neurological:  Positive for numbness. Negative for aphonia, brief paralysis, difficulty with concentration, disturbances in coordination, excessive daytime sleepiness, dizziness, focal weakness, headaches, light-headedness, loss of balance, paresthesias, seizures, sensory change, tremors, vertigo and weakness.   Psychiatric/Behavioral:  Negative for altered mental status, depression, hallucinations, memory loss, substance abuse, suicidal ideas and thoughts of violence. The patient does not have insomnia and is not nervous/anxious.    Allergic/Immunologic: Negative  "for hives and persistent infections.        Objective:       Vitals:    07/18/25 0937   BP: (!) 152/76   BP Location: Left arm   Patient Position: Sitting   Pulse: (!) 58   Resp: 16   SpO2: 97%   Weight: 78.4 kg (172 lb 13.5 oz)   Height: 6' 2" (1.88 m)    Physical Exam  Constitutional:       General: He is not in acute distress.     Appearance: He is well-developed. He is not diaphoretic.   HENT:      Head: Normocephalic and atraumatic.      Nose: Nose normal.   Eyes:      General: No scleral icterus.        Right eye: No discharge.      Conjunctiva/sclera: Conjunctivae normal.      Pupils: Pupils are equal, round, and reactive to light.   Neck:      Thyroid: No thyromegaly.      Vascular: No JVD.      Trachea: No tracheal deviation.   Cardiovascular:      Rate and Rhythm: Normal rate and regular rhythm.      Pulses:           Carotid pulses are 2+ on the right side and 2+ on the left side.       Radial pulses are 2+ on the right side and 2+ on the left side.      Heart sounds: Normal heart sounds. No murmur heard.     No friction rub. No gallop.   Pulmonary:      Effort: Pulmonary effort is normal. No respiratory distress.      Breath sounds: Normal breath sounds. No stridor. No wheezing or rales.   Chest:      Chest wall: No tenderness.   Abdominal:      General: Bowel sounds are normal. There is no distension.      Palpations: Abdomen is soft. There is no mass.      Tenderness: There is no abdominal tenderness. There is no guarding or rebound.   Musculoskeletal:         General: No tenderness. Normal range of motion.      Cervical back: Normal range of motion and neck supple.   Lymphadenopathy:      Cervical: No cervical adenopathy.   Skin:     General: Skin is warm and dry.      Coloration: Skin is not pale.      Findings: No erythema or rash.   Neurological:      Mental Status: He is alert and oriented to person, place, and time.      Cranial Nerves: No cranial nerve deficit.      Coordination: Coordination " normal.   Psychiatric:         Behavior: Behavior normal.         Thought Content: Thought content normal.         Judgment: Judgment normal.           Assessment:       1. Essential hypertension    2. PAF (paroxysmal atrial fibrillation)    3. Hyperlipidemia, unspecified hyperlipidemia type    4. Coronary artery disease involving native coronary artery of native heart without angina pectoris    5. Bifascicular block    6. Pulmonary hypertension    7. ESRD (end stage renal disease) on dialysis    8. Type 2 diabetes mellitus with stage 3b chronic kidney disease, without long-term current use of insulin      Results for orders placed or performed during the hospital encounter of 07/15/25   Transesophageal echo (HUMBERTO)    Collection Time: 07/15/25 10:38 AM   Result Value Ref Range    BSA 2 m2    OHS CV CPX PATIENT HEIGHT IN 74     EF 45 %    Left Atrial Appendage Peak Velocity 40.0 cm/s    Sinus 3.8 cm    ASI 1.9 cm/m2    Aortic Height Index (SOV) 2.0 cm/m    STJ 3.2 cm    Ascending aorta 4.4 cm    ASI 2.2 cm/m2    Aortic Height Index 2.3 cm/m     *Note: Due to a large number of results and/or encounters for the requested time period, some results have not been displayed. A complete set of results can be found in Results Review.       Current Medications[1]     Lab Results   Component Value Date    WBC 6.87 07/08/2025    RBC 3.28 (L) 07/08/2025    HGB 10.2 (L) 07/08/2025    HCT 33.8 (L) 07/08/2025     (H) 07/08/2025    MCH 31.1 (H) 07/08/2025    MCHC 30.2 (L) 07/08/2025    RDW 19.7 (H) 07/08/2025     07/08/2025    MPV 8.5 (L) 07/08/2025    GRAN 1.9 03/04/2025    GRAN 62.6 03/04/2025    LYMPH 19.5 06/30/2025    LYMPH 1.59 06/30/2025    MONO 8.3 06/30/2025    MONO 0.68 06/30/2025    EOS 0.2 06/30/2025    EOS 0.02 06/30/2025    BASO 21 05/12/2025    EOSINOPHIL 1.6 05/12/2025    BASOPHIL 0.1 06/30/2025    BASOPHIL 0.01 06/30/2025    MG 1.7 06/02/2025        CMP  Lab Results   Component Value Date      07/08/2025    K 4.9 07/08/2025     07/08/2025    CO2 26 07/08/2025     (H) 07/08/2025    BUN 24 (H) 07/08/2025    CREATININE 4.3 (H) 07/08/2025    CALCIUM 7.8 (L) 07/08/2025    PROT 6.3 06/30/2025    ALBUMIN 2.8 (L) 06/30/2025    BILITOT 1.0 06/30/2025    ALKPHOS 172 (H) 06/30/2025    AST 49 (H) 06/30/2025    ALT 32 06/30/2025    ANIONGAP 9 07/08/2025    ESTGFRAFRICA 18.9 (A) 07/27/2022    EGFRNONAA 16.3 (A) 07/27/2022        Lab Results   Component Value Date    LABBLOO No growth after 5 days. 02/25/2025    LABBLOO No growth after 5 days. 02/25/2025    LABURIN No growth 02/25/2025            Results for orders placed or performed during the hospital encounter of 07/15/25   EKG 12-lead    Collection Time: 07/15/25  1:02 PM   Result Value Ref Range    QRS Duration 150 ms    OHS QTC Calculation 548 ms    Narrative    Test Reason : I49.9,    Vent. Rate :  60 BPM     Atrial Rate :  60 BPM     P-R Int : 184 ms          QRS Dur : 150 ms      QT Int : 548 ms       P-R-T Axes :  61 -58  17 degrees    QTcB Int : 548 ms    Normal sinus rhythm  Right bundle branch block  Left anterior fascicular block   Bifascicular block   Abnormal ECG  When compared with ECG of 15-Jul-2025 07:37,  QRS duration has increased  Criteria for Anteroseptal infarct are no longer Present  T wave inversion no longer evident in Lateral leads  QT has lengthened  Confirmed by Rod Hammond ([id number]) on 7/15/2025 2:42:50 PM    Referred By: JARAD COOPER           Confirmed By: Rod Hammond                   Plan:       Problem List Items Addressed This Visit          Cardiac/Vascular    Essential hypertension - Primary    He monitors 4 times a day at home.  He states his blood pressures are stable.  Today's blood pressure 152/76.  He states he never sees readings like that at home.  Toprol will continue at 25 mg daily.         Relevant Medications    metoprolol succinate (TOPROL-XL) 25 MG 24 hr tablet    HLD (hyperlipidemia)     Pravastatin 40 mg prescribed chronically.  Last LDL 47 mg% by labs 2025 June.  Condition controlled.         Coronary artery disease involving native coronary artery of native heart without angina pectoris    Recent stress test May 2025 no active ischemia ejection fraction 49%.  History of previous LAD stent.    Follow-up heart catheterization 2021 no significant obstructive disease, patent stent.         Bifascicular block    Condition unchanged.         Pulmonary hypertension    Persistent elevation.  He just started hemodialysis.  Last PA pressure by echo 70 mm Hg range.         PAF (paroxysmal atrial fibrillation)    Recent PVI procedure.  He is remains on amiodarone.  He is in sinus rhythm by exam today.  Low-dose Eliquis to continue.  His ejection fraction improved post correction of AFib.            Renal/    ESRD (end stage renal disease) on dialysis    Initiated about a month ago.  He will need clearance for fistula formation.  He saw vascular surgery.            Endocrine    T2DM (type 2 diabetes mellitus)    Condition unchanged.               Four-month follow-up advised.    Clearance for his surgeries will be given from a coronary artery disease standpoint.    I considered adding therapy for blood pressure but it sounds like his readings have been fairly stable at home 120-130 systolic range max.             Zafar Rojas MD  07/18/2025   9:59 AM               [1]   Current Outpatient Medications:     ACCU-CHEK SOFTCLIX LANCETS Misc, TEST BLOOD SUGAR THREE TIMES DAILY, Disp: 300 each, Rfl: 3    alcohol swabs (DROPSAFE ALCOHOL PREP PADS) PadM, Apply 1 each topically once daily., Disp: 500 each, Rfl: 3    allopurinoL (ZYLOPRIM) 100 MG tablet, Take 1 tablet (100 mg total) by mouth once daily., Disp: 30 tablet, Rfl: 1    amiodarone (PACERONE) 200 MG Tab, TAKE 1 TABLET ONE TIME DAILY, Disp: 100 tablet, Rfl: 3    apixaban (ELIQUIS) 2.5 mg Tab, Take 2 tablets (5 mg total) by mouth 2 (two) times daily.,  Disp: 60 tablet, Rfl: 1    aspirin (ECOTRIN) 81 MG EC tablet, Take 81 mg by mouth once daily., Disp: , Rfl:     blood sugar diagnostic (ACCU-CHEK GUIDE TEST STRIPS) Strp, 1 each by Other route 3 (three) times daily. Test Blood Sugar, Disp: 300 strip, Rfl: 10    blood-glucose meter (ACCU-CHEK GUIDE GLUCOSE METER) Misc, Accucheck BID, Disp: 1 each, Rfl: 0    brimonidine 0.2% (ALPHAGAN) 0.2 % Drop, Place 1 drop into both eyes 2 (two) times a day., Disp: 60 mL, Rfl: 3    ferrous sulfate (IRON) 325 mg (65 mg iron) Tab tablet, Take 1 tablet (325 mg total) by mouth every other day., Disp: 45 tablet, Rfl: 3    gabapentin (NEURONTIN) 100 MG capsule, Take 2 capsules (200 mg total) by mouth every evening., Disp: 160 capsule, Rfl: 0    [Paused] glimepiride (AMARYL) 4 MG tablet, Take 1 tablet (4 mg total) by mouth daily with breakfast., Disp: , Rfl:     leuprolide acetate, 6 month, (LUPRON) 45 mg SyKt injection, Inject 45 mg into the muscle every 6 (six) months. for 4 doses, Disp: , Rfl:     levothyroxine (SYNTHROID) 25 MCG tablet, Take 1 tablet (25 mcg total) by mouth before breakfast., Disp: 30 tablet, Rfl: 0    LIDOcaine (LIDODERM) 5 %, Place 1 patch onto the skin once daily. Remove & Discard patch within 12 hours or as directed by MD, Disp: 30 patch, Rfl: 1    pantoprazole (PROTONIX) 40 MG tablet, Take 40 mg by mouth once daily., Disp: , Rfl:     pravastatin (PRAVACHOL) 40 MG tablet, Take 1 tablet (40 mg total) by mouth once daily., Disp: 90 tablet, Rfl: 3    silver sulfADIAZINE 1% (SILVADENE) 1 % cream, Apply to affected area daily, Disp: 20 g, Rfl: 0    tamsulosin (FLOMAX) 0.4 mg Cap, Take 1 capsule (0.4 mg total) by mouth once daily., Disp: 90 capsule, Rfl: 3    vitamin renal formula, B-complex-vitamin c-folic acid, (NEPHROCAP) 1 mg Cap, Take 1 capsule by mouth once daily., Disp: 90 each, Rfl: 0    vitamin renal formula, B-complex-vitamin c-folic acid, (WESCAPS) 1 mg Cap, Take 1 capsule by mouth once daily., Disp: 90  capsule, Rfl: 0    metoprolol succinate (TOPROL-XL) 25 MG 24 hr tablet, Take 1 tablet (25 mg total) by mouth once daily., Disp: , Rfl:     Current Facility-Administered Medications:     leuprolide acetate (6 month) injection 45 mg, 45 mg, Intramuscular, Q6 Months,     Facility-Administered Medications Ordered in Other Visits:     sodium chloride 0.9% in 1,000 mL infusion, , Intravenous, Continuous, Order, Paper

## 2025-07-18 NOTE — PROGRESS NOTES
SUBJECTIVE:     Chief Complaint & History of Present Illness:  History of Present Illness    CHIEF COMPLAINT:  - Right knee pain and swelling    HPI:  Mr. Charlton presents with ongoing right knee pain, which has been a problem for years and has persisted for months. He denies any clicking or popping in the knee when walking. Various practitioners have attributed his knee pain to gout, treating him with prednisone for control. He has been taking allopurinol for gout.    Approximately one month ago, on June 30th, he was hospitalized at [facility] with his leg severely reddened, a condition that persisted for about a month. An XR revealed a large cyst. A couple of months ago, he had fluid drained from his knee during a previous visit. He uses OTC lidocaine patches for pain management due to a lack of prescribed pain medication.    He denies any recent accidents or injuries but mentions falling due to dizziness and being off-balance. He reports a recent small stroke a few months ago. He also states he has three discs with significant degeneration and needs a new shoulder due to rotator cuff damage from a fall.    He denies any history of knee replacement.    PREVIOUS TREATMENTS:  - Mr. Charlton had fluid drained from the right knee a few months ago  - Mr. Charlton uses Lidocaine patches for pain control with minimal benefit    MEDICATIONS:  - Allopurinol for gout  - Prednisone for gout flares  - Tylenol for pain      ROS:  Constitutional: +dizziness  Musculoskeletal: +joint pain, +limb pain  Neurological: +nerve pain          Past Medical History:   Diagnosis Date    Autonomic dysfunction 11/2023 11/2023 neuro note    Basal cell carcinoma     BPH (benign prostatic hyperplasia)     s/p TURP    CAD (coronary artery disease) 2017    status post PCI to mid LAD in 2017 ([facility] in 2021 showed patent stent, otherwise nonobstructive CAD)    Cancer of prostate     s/p TURP    Chronic kidney disease     DDD (degenerative disc  disease) 10/21/2013    Diabetes mellitus with renal complications     Disc disease, degenerative, cervical     DVT (deep venous thrombosis)     Encounter for blood transfusion     GERD (gastroesophageal reflux disease)     History of ulcerative colitis 1982    s/p colectomy and ileostomy    HLD (hyperlipidemia)     Ileostomy in place 1982    RBBB     Squamous cell carcinoma 03/08/2018    Left superior helix near insertion    Squamous cell carcinoma 04/12/2018    Left forearm x 5    Ventricular tachycardia        Past Surgical History:   Procedure Laterality Date    ABLATION OF ARRHYTHMOGENIC FOCUS FOR ATRIAL FIBRILLATION N/A 7/15/2025    Procedure: Ablation atrial fibrillation;  Surgeon: Franky Taylor MD;  Location: [facility] EP LAB;  Service: Cardiology;  Laterality: N/A;  AF, definite HUMBERTO, PVI, PFA, Carto, Gen, GP, 3 Prep *MDT ILR in situ* *Dialysis pt* *R SCV tunneled cath*    ABLATION, SVT, ACCESSORY PATHWAY N/A 04/30/2024    Procedure: Ablation, SVT, Accessory Pathway;  Surgeon: Franky Taylor MD;  Location: [facility] EP LAB;  Service: Cardiology;  Laterality: N/A;  VT, RFA, Carto, MAC, GP, 3 Prep *MDT ILR in situ*    cardiac stents      CATARACT EXTRACTION Bilateral     CERVICAL FUSION      CHOLECYSTECTOMY N/A 02/14/2023    Procedure: CHOLECYSTECTOMY;  Surgeon: Mike Joyce MD;  Location: [facility] OR;  Service: General;  Laterality: N/A;  very high probabilty of converison to open    colectomy and ileostomy  1985    EGD, WITH CLOSED BIOPSY  04/04/2025    ENDOSCOPIC ULTRASOUND OF UPPER GASTROINTESTINAL TRACT N/A 02/10/2023    Procedure: ULTRASOUND, UPPER GI TRACT, ENDOSCOPIC;  Surgeon: Poli Fuller MD;  Location: [facility] ENDO;  Service: Endoscopy;  Laterality: N/A;    ERCP N/A 02/10/2023    Procedure: ERCP (ENDOSCOPIC RETROGRADE CHOLANGIOPANCREATOGRAPHY);  Surgeon: Poli Fuller MD;  Location: [facility] ENDO;  Service: Endoscopy;  Laterality: N/A;    ESOPHAGOGASTRODUODENOSCOPY N/A 4/4/2025    Procedure: EGD  (ESOPHAGOGASTRODUODENOSCOPY);  Surgeon: Melania Gibson MD;  Location: [facility] ENDO;  Service: Endoscopy;  Laterality: N/A;    EXCISION OF PAROTID GLAND Left 12/18/2020    Procedure: EXCISION, PAROTID GLAND;  Surgeon: Michael Pinzon MD;  Location: [facility] OR 2ND FLR;  Service: ENT;  Laterality: Left;    INSERTION OF IMPLANTABLE LOOP RECORDER  06/07/2021    INSERTION OF IMPLANTABLE LOOP RECORDER Left 06/07/2021    Procedure: INSERTION, IMPLANTABLE LOOP RECORDER;  Surgeon: Romario Dao MD;  Location: [facility] CATH LAB;  Service: Cardiology;  Laterality: Left;    INSERTION, CATHETER, TUNNELED N/A 5/27/2025    Procedure: Insertion,catheter,tunneled;  Surgeon: Jamie Stokes MD;  Location: [facility] CATH LAB;  Service: Radiology;  Laterality: N/A;    LEFT HEART CATHETERIZATION Right 04/15/2021    Procedure: CATHETERIZATION, HEART, LEFT;  Surgeon: Marcio Jones MD;  Location: [facility] CATH LAB;  Service: Cardiology;  Laterality: Right;    LYSIS OF ADHESIONS N/A 11/09/2020    Procedure: LYSIS, ADHESIONS,  ERAS low;  Surgeon: CED Haley MD;  Location: [facility] OR [facility] FLR;  Service: Colon and Rectal;  Laterality: N/A;    LYSIS OF ADHESIONS N/A 02/14/2023    Procedure: LYSIS, ADHESIONS;  Surgeon: Mike Joyce MD;  Location: [facility] OR;  Service: General;  Laterality: N/A;    POUCHOSCOPY N/A 04/06/2022    Procedure: ENDOSCOPY, POUCH, SMALL INTESTINE, DIAGNOSTIC;  Surgeon: Dieter Juarez MD;  Location: [facility] ENDO (2ND FLR);  Service: Endoscopy;  Laterality: N/A;    REPAIR, HERNIA, PARASTOMAL N/A 11/09/2020    Procedure: REPAIR, HERNIA, PARASTOMAL;  Surgeon: CDE Haley MD;  Location: [facility] OR 2ND FLR;  Service: Colon and Rectal;  Laterality: N/A;    TRANSESOPHAGEAL ECHOCARDIOGRAPHY N/A 7/15/2025    Procedure: ECHOCARDIOGRAM, TRANSESOPHAGEAL;  Surgeon: Lopez Biswas III, MD;  Location: [facility] EP LAB;  Service: Cardiology;  Laterality: N/A;    TRANSURETHRAL RESECTION OF PROSTATE (TURP) WITHOUT USE OF LASER  "N/A 01/23/2019    Procedure: TURP, WITHOUT USING LASER BIPOLAR;  Surgeon: Catarino Mota MD;  [facility];  Service: Urology;  Laterality: N/A;  1.5 HOURS    TREATMENT OF CARDIAC ARRHYTHMIA  04/30/2024    Procedure: Cardioversion or Defibrillation;  Surgeon: Franky Taylor MD;  Location: [facility] EP LAB;  Service: Cardiology;;       Family History   Problem Relation Name Age of Onset    Dementia Mother      Cancer Father      Diabetes Father      Heart disease Father      Melanoma Neg Hx      Hypertension Neg Hx      Arthritis Neg Hx         Review of patient's allergies indicates:   Allergen Reactions    Atorvastatin     Rosuvastatin          Current Medications[1]      OBJECTIVE:     PHYSICAL EXAM:  Ht 6' 2" (1.88 m)   Wt 78.8 kg (173 lb 13.3 oz)   BMI 22.32 kg/m²   General: Pleasant, cooperative, NAD.  HEENT: NCAT, sclera nonicteric.  Lungs: Respirations are equal and unlabored.   Abdomen: Soft and non-tender.  CV: 2+ bilateral upper and lower extremity pulses.  Neuro: Sensation intact to light touch.  Skin: Intact throughout LE with no rashes, erythema, or lesions.  Extremities: No LE edema, NVI lower extremities. antalgic gait.    right Knee Exam:  Knee Range of Motion: 0-110   Effusion: none  Condition of skin: intact  Location of tenderness: Lateral joint line   Strength: 5/5 quadriceps strength, 5/5 gastroc-soleus strength, 5/5 hamstring strength, and 5/5 tibialis anterior strength  Stability: stable to testing  Knee Alignment: normal  Luis: negative    RADIOGRAPHS:  X-rays of the right knee taken previously were personally reviewed. Imaging reveals no acute fractures or dislocations. Tricompartmental osteoarthritic changes with intraarticular calcifications.      ASSESSMENT:       ICD-10-CM ICD-9-CM   1. Primary osteoarthritis of right knee  M17.11 715.16   2. Synovial cyst of right popliteal space  M71.21 727.51   3. Baker's cyst of knee, right  M71.21 727.51       PLAN:     We discussed " with the patient at length all the different treatment options available including acetaminophen, rest, ice, knee strengthening exercise, occasional cortisone injections for temporary relief, Viscosupplimentation injections, arthroscopic debridement, osteotomy, and finally knee arthroplasty.     Assessment & Plan    - Right knee steroid injection administered today.  - Explained alternative treatment option of gel injections (hyaluronic acid) if steroid injections are not effective.  - Discussed potential total knee replacement in the future if conservative treatments fail. Mr. Charlton declines surgical intervention at this time.  - Take Tylenol for pain.  - Contact the office if knee pain worsens or if no improvement.        Knee Injection Procedure Note  Diagnosis: right knee degenerative arthritis  Indications: right knee pain  Procedure Details: Verbal consent was obtained for the procedure. The injection site was identified and the skin was prepared with alcohol. The right knee was injected from an anterolateral approach with 1 ml of Celestone and 4 ml Lidocaine under sterile technique using a 22 gauge needle. The needle was removed and the area cleansed and dressed.  Complications:  Patient tolerated the procedure well.    he was advised to rest the knee today, using ice and elevation as needed for comfort and swelling.Immediate relief of the knee pain may be short lived and secondary to the lidocaine. he may have an increase in discomfort tonight followed by steady improvement over the next several days. It may take 1-2 weeks following the injection to get the full benefit of the medication.     This note was generated with the assistance of ambient listening technology. Verbal consent was obtained by the patient and accompanying visitor(s) for the recording of patient appointment to facilitate this note. I attest to having reviewed and edited the generated note for accuracy, though some syntax or spelling  errors may persist. Please contact the author of this note for any clarification.         John Lutz PA-C         [1]   Current Outpatient Medications:     ACCU-CHEK SOFTCLIX LANCETS Misc, TEST BLOOD SUGAR THREE TIMES DAILY, Disp: 300 each, Rfl: 3    alcohol swabs (DROPSAFE ALCOHOL PREP PADS) PadM, Apply 1 each topically once daily., Disp: 500 each, Rfl: 3    allopurinoL (ZYLOPRIM) 100 MG tablet, Take 1 tablet (100 mg total) by mouth once daily., Disp: 30 tablet, Rfl: 1    amiodarone (PACERONE) 200 MG Tab, TAKE 1 TABLET ONE TIME DAILY, Disp: 100 tablet, Rfl: 3    apixaban (ELIQUIS) 2.5 mg Tab, Take 2 tablets (5 mg total) by mouth 2 (two) times daily., Disp: 60 tablet, Rfl: 1    aspirin (ECOTRIN) 81 MG EC tablet, Take 81 mg by mouth once daily., Disp: , Rfl:     blood sugar diagnostic (ACCU-CHEK GUIDE TEST STRIPS) Strp, 1 each by Other route 3 (three) times daily. Test Blood Sugar, Disp: 300 strip, Rfl: 10    blood-glucose meter (ACCU-CHEK GUIDE GLUCOSE METER) [facility] [place]uc[place] BID, Disp: 1 each, Rfl: 0    brimonidine 0.2% (ALPHAGAN) 0.2 % Drop, Place 1 drop into both eyes 2 (two) times a day., Disp: 60 mL, Rfl: 3    ferrous sulfate (IRON) 325 mg (65 mg iron) Tab tablet, Take 1 tablet (325 mg total) by mouth every other day., Disp: 45 tablet, Rfl: 3    gabapentin (NEURONTIN) 100 MG capsule, Take 2 capsules (200 mg total) by mouth every evening., Disp: 160 capsule, Rfl: 0    [Paused] glimepiride (AMARYL) 4 MG tablet, Take 1 tablet (4 mg total) by mouth daily with breakfast., Disp: , Rfl:     HYDROcodone-acetaminophen (NORCO) 5-325 mg per tablet, Take 1 tablet by mouth every 6 (six) hours as needed for Pain., Disp: 12 tablet, Rfl: 0    leuprolide acetate, 6 month, (LUPRON) 45 mg SyKt injection, Inject 45 mg into the muscle every 6 (six) months. for 4 doses, Disp: , Rfl:     levothyroxine (SYNTHROID) 25 MCG tablet, Take 1 tablet (25 mcg total) by mouth before breakfast., Disp: 30 tablet, Rfl: 0    LIDOcaine  (LIDODERM) 5 %, Place 1 patch onto the skin once daily. Remove & Discard patch within 12 hours or as directed by MD, Disp: 30 patch, Rfl: 1    metoprolol succinate (TOPROL-XL) 25 MG 24 hr tablet, Take 0.5 tablets (12.5 mg total) by mouth once daily., Disp: 45 tablet, Rfl: 3    midodrine (PROAMATINE) 10 MG tablet, Take 1 tablet (10 mg total) by mouth 3 (three) times daily., Disp: 90 tablet, Rfl: 0    pantoprazole (PROTONIX) 40 MG tablet, Take 40 mg by mouth once daily., Disp: , Rfl:     pravastatin (PRAVACHOL) 40 MG tablet, Take 1 tablet (40 mg total) by mouth once daily., Disp: 90 tablet, Rfl: 3    silver sulfADIAZINE 1% (SILVADENE) 1 % cream, Apply to affected area daily, Disp: 20 g, Rfl: 0    tamsulosin (FLOMAX) 0.4 mg Cap, Take 1 capsule (0.4 mg total) by mouth once daily., Disp: 90 capsule, Rfl: 3    vitamin renal formula, B-complex-vitamin c-folic acid, (NEPHROCAP) 1 mg Cap, Take 1 capsule by mouth once daily., Disp: 90 each, Rfl: 0    vitamin renal formula, B-complex-vitamin c-folic acid, (WESCAPS) 1 mg Cap, Take 1 capsule by mouth once daily., Disp: 90 capsule, Rfl: 0    Current Facility-Administered Medications:     leuprolide acetate (6 month) injection 45 mg, 45 mg, Intramuscular, Q6 Months,     Facility-Administered Medications Ordered in Other Visits:     sodium chloride 0.9% in 1,000 mL infusion, , Intravenous, Continuous, Order, Paper

## 2025-07-18 NOTE — ASSESSMENT & PLAN NOTE
Pravastatin 40 mg prescribed chronically.  Last LDL 47 mg% by labs 2025 June.  Condition controlled.

## 2025-07-18 NOTE — ASSESSMENT & PLAN NOTE
Recent stress test May 2025 no active ischemia ejection fraction 49%.  History of previous LAD stent.    Follow-up heart catheterization 2021 no significant obstructive disease, patent stent.

## 2025-07-18 NOTE — ASSESSMENT & PLAN NOTE
He monitors 4 times a day at home.  He states his blood pressures are stable.  Today's blood pressure 152/76.  He states he never sees readings like that at home.  Toprol will continue at 25 mg daily.

## 2025-07-18 NOTE — ASSESSMENT & PLAN NOTE
Initiated about a month ago.  He will need clearance for fistula formation.  He saw vascular surgery.

## 2025-07-18 NOTE — ASSESSMENT & PLAN NOTE
Recent PVI procedure.  He is remains on amiodarone.  He is in sinus rhythm by exam today.  Low-dose Eliquis to continue.  His ejection fraction improved post correction of AFib.

## 2025-07-23 ENCOUNTER — TELEPHONE (OUTPATIENT)
Dept: ELECTROPHYSIOLOGY | Facility: CLINIC | Age: 75
End: 2025-07-23
Payer: MEDICARE

## 2025-07-23 DIAGNOSIS — I48.0 PAF (PAROXYSMAL ATRIAL FIBRILLATION): Primary | ICD-10-CM

## 2025-07-23 NOTE — TELEPHONE ENCOUNTER
Spoke with pt's daughter, pt has dialysis Mondays, Wednesdays, and Fridays. Offered Tuesday 10/28 with EKG at 10:30am and office visit with Ivette at 11:00am. Daughter agreed to appt date/time/location/provider/instructions.

## 2025-07-23 NOTE — TELEPHONE ENCOUNTER
Called to let pt know of post-op appt scheduled for 10/22 with EKG at 10:30am and OV at 11:00am with Ivette Grover NP at [facility]. No kendall, CHANDRAKANTM with dept # to call back and reschedule if needed.

## 2025-07-25 ENCOUNTER — PATIENT MESSAGE (OUTPATIENT)
Dept: UROLOGY | Facility: CLINIC | Age: 75
End: 2025-07-25
Payer: MEDICARE

## 2025-08-12 ENCOUNTER — OFFICE VISIT (OUTPATIENT)
Dept: ENDOCRINOLOGY | Facility: CLINIC | Age: 75
End: 2025-08-12
Payer: MEDICARE

## 2025-08-12 ENCOUNTER — HOSPITAL ENCOUNTER (EMERGENCY)
Facility: HOSPITAL | Age: 75
Discharge: HOME | End: 2025-08-12
Attending: EMERGENCY MEDICINE
Payer: MEDICARE

## 2025-08-12 VITALS
BODY MASS INDEX: 21.69 KG/M2 | HEIGHT: 74 IN | SYSTOLIC BLOOD PRESSURE: 110 MMHG | HEART RATE: 60 BPM | OXYGEN SATURATION: 96 % | WEIGHT: 169 LBS | DIASTOLIC BLOOD PRESSURE: 68 MMHG

## 2025-08-12 VITALS
HEIGHT: 74 IN | WEIGHT: 169 LBS | TEMPERATURE: 99 F | SYSTOLIC BLOOD PRESSURE: 141 MMHG | RESPIRATION RATE: 16 BRPM | OXYGEN SATURATION: 99 % | BODY MASS INDEX: 21.69 KG/M2 | HEART RATE: 67 BPM | DIASTOLIC BLOOD PRESSURE: 85 MMHG

## 2025-08-12 DIAGNOSIS — E03.2 HYPOTHYROIDISM DUE TO MEDICATION: ICD-10-CM

## 2025-08-12 DIAGNOSIS — R52 PAIN: ICD-10-CM

## 2025-08-12 DIAGNOSIS — N18.6 ESRD (END STAGE RENAL DISEASE) ON DIALYSIS: ICD-10-CM

## 2025-08-12 DIAGNOSIS — E78.5 HYPERLIPIDEMIA, UNSPECIFIED HYPERLIPIDEMIA TYPE: ICD-10-CM

## 2025-08-12 DIAGNOSIS — E55.9 VITAMIN D INSUFFICIENCY: ICD-10-CM

## 2025-08-12 DIAGNOSIS — E11.22 TYPE 2 DIABETES MELLITUS WITH STAGE 3B CHRONIC KIDNEY DISEASE, WITHOUT LONG-TERM CURRENT USE OF INSULIN: ICD-10-CM

## 2025-08-12 DIAGNOSIS — E11.649 TYPE 2 DIABETES MELLITUS WITH HYPOGLYCEMIA WITHOUT COMA, WITHOUT LONG-TERM CURRENT USE OF INSULIN: Primary | ICD-10-CM

## 2025-08-12 DIAGNOSIS — I10 ESSENTIAL HYPERTENSION: ICD-10-CM

## 2025-08-12 DIAGNOSIS — S62.101A CLOSED FRACTURE OF RIGHT WRIST, INITIAL ENCOUNTER: Primary | ICD-10-CM

## 2025-08-12 DIAGNOSIS — E16.2 HYPOGLYCEMIA: ICD-10-CM
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
## 2025-08-12 DIAGNOSIS — T14.90XA TRAUMA: ICD-10-CM
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
## 2025-08-12 DIAGNOSIS — N18.32 TYPE 2 DIABETES MELLITUS WITH STAGE 3B CHRONIC KIDNEY DISEASE, WITHOUT LONG-TERM CURRENT USE OF INSULIN: ICD-10-CM

## 2025-08-12 DIAGNOSIS — Z99.2 ESRD (END STAGE RENAL DISEASE) ON DIALYSIS: ICD-10-CM

## 2025-08-12 LAB
ABSOLUTE EOSINOPHIL (OHS): 0.03 K/UL
ABSOLUTE MONOCYTE (OHS): 0.6 K/UL (ref 0.3–1)
ABSOLUTE NEUTROPHIL COUNT (OHS): 5.66 K/UL (ref 1.8–7.7)
ALBUMIN SERPL BCP-MCNC: 2.6 G/DL (ref 3.5–5.2)
ALP SERPL-CCNC: 104 UNIT/L (ref 40–150)
ALT SERPL W/O P-5'-P-CCNC: 11 UNIT/L (ref 0–55)
ANION GAP (OHS): 11 MMOL/L (ref 8–16)
AST SERPL-CCNC: 23 UNIT/L (ref 0–50)
BASOPHILS # BLD AUTO: 0.02 K/UL
BASOPHILS NFR BLD AUTO: 0.3 %
BILIRUB SERPL-MCNC: 0.8 MG/DL (ref 0.1–1)
BUN SERPL-MCNC: 24 MG/DL (ref 8–23)
CALCIUM SERPL-MCNC: 7.6 MG/DL (ref 8.7–10.5)
CHLORIDE SERPL-SCNC: 99 MMOL/L (ref 95–110)
CO2 SERPL-SCNC: 24 MMOL/L (ref 23–29)
CREAT SERPL-MCNC: 5.4 MG/DL (ref 0.5–1.4)
ERYTHROCYTE [DISTWIDTH] IN BLOOD BY AUTOMATED COUNT: 17 % (ref 11.5–14.5)
GFR SERPLBLD CREATININE-BSD FMLA CKD-EPI: 10 ML/MIN/1.73/M2
GLUCOSE SERPL-MCNC: 113 MG/DL (ref 70–110)
HCT VFR BLD AUTO: 39.8 % (ref 40–54)
HGB BLD-MCNC: 12.1 GM/DL (ref 14–18)
IMM GRANULOCYTES # BLD AUTO: 0.05 K/UL (ref 0–0.04)
IMM GRANULOCYTES NFR BLD AUTO: 0.7 % (ref 0–0.5)
LYMPHOCYTES # BLD AUTO: 0.72 K/UL (ref 1–4.8)
MCH RBC QN AUTO: 30.9 PG (ref 27–31)
MCHC RBC AUTO-ENTMCNC: 30.4 G/DL (ref 32–36)
MCV RBC AUTO: 102 FL (ref 82–98)
NUCLEATED RBC (/100WBC) (OHS): 0 /100 WBC
OHS QRS DURATION: 134 MS
OHS QTC CALCULATION: 499 MS
PLATELET # BLD AUTO: 141 K/UL (ref 150–450)
PMV BLD AUTO: 9.7 FL (ref 9.2–12.9)
POTASSIUM SERPL-SCNC: 5 MMOL/L (ref 3.5–5.1)
PROT SERPL-MCNC: 6 GM/DL (ref 6–8.4)
RBC # BLD AUTO: 3.92 M/UL (ref 4.6–6.2)
RELATIVE EOSINOPHIL (OHS): 0.4 %
RELATIVE LYMPHOCYTE (OHS): 10.2 % (ref 18–48)
RELATIVE MONOCYTE (OHS): 8.5 % (ref 4–15)
RELATIVE NEUTROPHIL (OHS): 79.9 % (ref 38–73)
SODIUM SERPL-SCNC: 134 MMOL/L (ref 136–145)
WBC # BLD AUTO: 7.08 K/UL (ref 3.9–12.7)

## 2025-08-12 PROCEDURE — 1100F PTFALLS ASSESS-DOCD GE2>/YR: CPT | Mod: CPTII,S$GLB,,

## 2025-08-12 PROCEDURE — 99999 PR PBB SHADOW E&M-EST. PATIENT-LVL V: CPT | Mod: PBBFAC,,,

## 2025-08-12 PROCEDURE — 96374 THER/PROPH/DIAG INJ IV PUSH: CPT

## 2025-08-12 PROCEDURE — 3074F SYST BP LT 130 MM HG: CPT | Mod: CPTII,S$GLB,,

## 2025-08-12 PROCEDURE — 99285 EMERGENCY DEPT VISIT HI MDM: CPT | Mod: 25

## 2025-08-12 PROCEDURE — 85025 COMPLETE CBC W/AUTO DIFF WBC: CPT

## 2025-08-12 PROCEDURE — 93010 ELECTROCARDIOGRAM REPORT: CPT | Mod: ,,, | Performed by: INTERNAL MEDICINE

## 2025-08-12 PROCEDURE — 93005 ELECTROCARDIOGRAM TRACING: CPT

## 2025-08-12 PROCEDURE — G2211 COMPLEX E/M VISIT ADD ON: HCPCS | Mod: S$GLB,,,

## 2025-08-12 PROCEDURE — 3078F DIAST BP <80 MM HG: CPT | Mod: CPTII,S$GLB,,

## 2025-08-12 PROCEDURE — 1125F AMNT PAIN NOTED PAIN PRSNT: CPT | Mod: CPTII,S$GLB,,

## 2025-08-12 PROCEDURE — 99214 OFFICE O/P EST MOD 30 MIN: CPT | Mod: S$GLB,,,

## 2025-08-12 PROCEDURE — 3288F FALL RISK ASSESSMENT DOCD: CPT | Mod: CPTII,S$GLB,,

## 2025-08-12 PROCEDURE — 63600175 PHARM REV CODE 636 W HCPCS

## 2025-08-12 PROCEDURE — 82040 ASSAY OF SERUM ALBUMIN: CPT

## 2025-08-12 PROCEDURE — 29125 APPL SHORT ARM SPLINT STATIC: CPT | Mod: RT

## 2025-08-12 RX ORDER — BLOOD-GLUCOSE SENSOR
1 EACH MISCELLANEOUS
Qty: 2 EACH | Refills: 11 | Status: SHIPPED | OUTPATIENT
Start: 2025-08-12 | End: 2026-08-12

## 2025-08-12 RX ORDER — MORPHINE SULFATE 4 MG/ML
4 INJECTION, SOLUTION INTRAMUSCULAR; INTRAVENOUS
Refills: 0 | Status: COMPLETED | OUTPATIENT
Start: 2025-08-12 | End: 2025-08-12

## 2025-08-12 RX ADMIN — MORPHINE SULFATE 4 MG: 4 INJECTION INTRAVENOUS at 10:08

## 2025-08-13 ENCOUNTER — TELEPHONE (OUTPATIENT)
Dept: ORTHOPEDICS | Facility: CLINIC | Age: 75
End: 2025-08-13
Payer: MEDICARE

## 2025-08-17 ENCOUNTER — HOSPITAL ENCOUNTER (INPATIENT)
Facility: HOSPITAL | Age: 75
LOS: 3 days | Discharge: HOME | DRG: 638 | End: 2025-08-22
Attending: EMERGENCY MEDICINE | Admitting: INTERNAL MEDICINE
Payer: MEDICARE

## 2025-08-17 DIAGNOSIS — Z87.438 HISTORY OF BPH: ICD-10-CM

## 2025-08-17 DIAGNOSIS — Z79.01 CHRONIC ANTICOAGULATION: ICD-10-CM

## 2025-08-17 DIAGNOSIS — N18.6 ESRD (END STAGE RENAL DISEASE) ON DIALYSIS: ICD-10-CM

## 2025-08-17 DIAGNOSIS — E16.2 HYPOGLYCEMIA: Primary | ICD-10-CM

## 2025-08-17 DIAGNOSIS — R00.8 TRIGEMINY: ICD-10-CM

## 2025-08-17 DIAGNOSIS — R07.9 CHEST PAIN: ICD-10-CM

## 2025-08-17 DIAGNOSIS — K92.2 GASTROINTESTINAL HEMORRHAGE, UNSPECIFIED GASTROINTESTINAL HEMORRHAGE TYPE: ICD-10-CM

## 2025-08-17 DIAGNOSIS — M25.532 ACUTE PAIN OF LEFT WRIST: ICD-10-CM

## 2025-08-17 DIAGNOSIS — E83.39 HYPERPHOSPHATEMIA: ICD-10-CM

## 2025-08-17 DIAGNOSIS — R94.31 ABNORMAL EKG: ICD-10-CM

## 2025-08-17 DIAGNOSIS — Z99.2 ESRD (END STAGE RENAL DISEASE) ON DIALYSIS: ICD-10-CM

## 2025-08-17 PROBLEM — E03.9 HYPOTHYROIDISM: Status: ACTIVE | Noted: 2025-08-17

## 2025-08-17 PROBLEM — I50.42 CHRONIC COMBINED SYSTOLIC AND DIASTOLIC CONGESTIVE HEART FAILURE: Status: ACTIVE | Noted: 2024-09-24

## 2025-08-17 LAB
ABSOLUTE EOSINOPHIL (OHS): 0.01 K/UL
ABSOLUTE EOSINOPHIL (OHS): 0.02 K/UL
ABSOLUTE MONOCYTE (OHS): 0.44 K/UL (ref 0.3–1)
ABSOLUTE MONOCYTE (OHS): 0.47 K/UL (ref 0.3–1)
ABSOLUTE NEUTROPHIL COUNT (OHS): 6.16 K/UL (ref 1.8–7.7)
ABSOLUTE NEUTROPHIL COUNT (OHS): 6.85 K/UL (ref 1.8–7.7)
ALBUMIN SERPL BCP-MCNC: 2.6 G/DL (ref 3.5–5.2)
ALP SERPL-CCNC: 106 UNIT/L (ref 40–150)
ALT SERPL W/O P-5'-P-CCNC: 9 UNIT/L (ref 0–55)
ANION GAP (OHS): 16 MMOL/L (ref 8–16)
APTT PPP: 27.1 SECONDS (ref 21–32)
AST SERPL-CCNC: 25 UNIT/L (ref 0–50)
BASOPHILS # BLD AUTO: 0.03 K/UL
BASOPHILS # BLD AUTO: 0.03 K/UL
BASOPHILS NFR BLD AUTO: 0.4 %
BASOPHILS NFR BLD AUTO: 0.4 %
BILIRUB SERPL-MCNC: 0.5 MG/DL (ref 0.1–1)
BUN SERPL-MCNC: 34 MG/DL (ref 8–23)
CALCIUM SERPL-MCNC: 7.7 MG/DL (ref 8.7–10.5)
CHLORIDE SERPL-SCNC: 107 MMOL/L (ref 95–110)
CO2 SERPL-SCNC: 17 MMOL/L (ref 23–29)
CREAT SERPL-MCNC: 6.4 MG/DL (ref 0.5–1.4)
ERYTHROCYTE [DISTWIDTH] IN BLOOD BY AUTOMATED COUNT: 16.3 % (ref 11.5–14.5)
ERYTHROCYTE [DISTWIDTH] IN BLOOD BY AUTOMATED COUNT: 18.3 % (ref 11.5–14.5)
GFR SERPLBLD CREATININE-BSD FMLA CKD-EPI: 8 ML/MIN/1.73/M2
GLUCOSE SERPL-MCNC: 60 MG/DL (ref 70–110)
HCT VFR BLD AUTO: 36.9 % (ref 40–54)
HCT VFR BLD AUTO: 38.3 % (ref 40–54)
HGB BLD-MCNC: 11.3 GM/DL (ref 14–18)
HGB BLD-MCNC: 11.5 GM/DL (ref 14–18)
HOLD SPECIMEN: NORMAL
IMM GRANULOCYTES # BLD AUTO: 0.06 K/UL (ref 0–0.04)
IMM GRANULOCYTES # BLD AUTO: 0.06 K/UL (ref 0–0.04)
IMM GRANULOCYTES NFR BLD AUTO: 0.7 % (ref 0–0.5)
IMM GRANULOCYTES NFR BLD AUTO: 0.8 % (ref 0–0.5)
INDIRECT COOMBS: NORMAL
INR PPP: 1.2 (ref 0.8–1.2)
LACTATE SERPL-SCNC: 1.1 MMOL/L (ref 0.5–2.2)
LYMPHOCYTES # BLD AUTO: 0.82 K/UL (ref 1–4.8)
LYMPHOCYTES # BLD AUTO: 0.89 K/UL (ref 1–4.8)
MAGNESIUM SERPL-MCNC: 1.7 MG/DL (ref 1.6–2.6)
MCH RBC QN AUTO: 30.2 PG (ref 27–31)
MCH RBC QN AUTO: 30.7 PG (ref 27–31)
MCHC RBC AUTO-ENTMCNC: 29.5 G/DL (ref 32–36)
MCHC RBC AUTO-ENTMCNC: 31.2 G/DL (ref 32–36)
MCV RBC AUTO: 102 FL (ref 82–98)
MCV RBC AUTO: 99 FL (ref 82–98)
NUCLEATED RBC (/100WBC) (OHS): 0 /100 WBC
NUCLEATED RBC (/100WBC) (OHS): 0 /100 WBC
PHOSPHATE SERPL-MCNC: 4.7 MG/DL (ref 2.7–4.5)
PLATELET # BLD AUTO: 169 K/UL (ref 150–450)
PLATELET # BLD AUTO: 202 K/UL (ref 150–450)
PMV BLD AUTO: 8.9 FL (ref 9.2–12.9)
PMV BLD AUTO: 9.3 FL (ref 9.2–12.9)
POCT GLUCOSE: 112 MG/DL (ref 70–110)
POCT GLUCOSE: 70 MG/DL (ref 70–110)
POTASSIUM SERPL-SCNC: 4.3 MMOL/L (ref 3.5–5.1)
PROT SERPL-MCNC: 5.9 GM/DL (ref 6–8.4)
PROTHROMBIN TIME: 12.8 SECONDS (ref 9–12.5)
RBC # BLD AUTO: 3.74 M/UL (ref 4.6–6.2)
RBC # BLD AUTO: 3.74 M/UL (ref 4.6–6.2)
RELATIVE EOSINOPHIL (OHS): 0.1 %
RELATIVE EOSINOPHIL (OHS): 0.3 %
RELATIVE LYMPHOCYTE (OHS): 10 % (ref 18–48)
RELATIVE LYMPHOCYTE (OHS): 11.7 % (ref 18–48)
RELATIVE MONOCYTE (OHS): 5.7 % (ref 4–15)
RELATIVE MONOCYTE (OHS): 5.8 % (ref 4–15)
RELATIVE NEUTROPHIL (OHS): 81 % (ref 38–73)
RELATIVE NEUTROPHIL (OHS): 83.1 % (ref 38–73)
RH BLD: NORMAL
SODIUM SERPL-SCNC: 140 MMOL/L (ref 136–145)
SPECIMEN OUTDATE: NORMAL
TROPONIN I SERPL HS-MCNC: 29 NG/L
WBC # BLD AUTO: 7.6 K/UL (ref 3.9–12.7)
WBC # BLD AUTO: 8.24 K/UL (ref 3.9–12.7)

## 2025-08-17 PROCEDURE — 93010 ELECTROCARDIOGRAM REPORT: CPT | Mod: ,,, | Performed by: STUDENT IN AN ORGANIZED HEALTH CARE EDUCATION/TRAINING PROGRAM

## 2025-08-17 PROCEDURE — 86901 BLOOD TYPING SEROLOGIC RH(D): CPT | Performed by: EMERGENCY MEDICINE

## 2025-08-17 PROCEDURE — 80053 COMPREHEN METABOLIC PANEL: CPT | Performed by: PHYSICIAN ASSISTANT

## 2025-08-17 PROCEDURE — G0378 HOSPITAL OBSERVATION PER HR: HCPCS

## 2025-08-17 PROCEDURE — 84484 ASSAY OF TROPONIN QUANT: CPT | Performed by: PHYSICIAN ASSISTANT

## 2025-08-17 PROCEDURE — 96374 THER/PROPH/DIAG INJ IV PUSH: CPT

## 2025-08-17 PROCEDURE — 85025 COMPLETE CBC W/AUTO DIFF WBC: CPT | Performed by: PHYSICIAN ASSISTANT

## 2025-08-17 PROCEDURE — 99285 EMERGENCY DEPT VISIT HI MDM: CPT | Mod: 25

## 2025-08-17 PROCEDURE — 83735 ASSAY OF MAGNESIUM: CPT | Performed by: EMERGENCY MEDICINE

## 2025-08-17 PROCEDURE — 63600175 PHARM REV CODE 636 W HCPCS: Performed by: EMERGENCY MEDICINE

## 2025-08-17 PROCEDURE — 85730 THROMBOPLASTIN TIME PARTIAL: CPT | Performed by: HOSPITALIST

## 2025-08-17 PROCEDURE — 84100 ASSAY OF PHOSPHORUS: CPT | Performed by: EMERGENCY MEDICINE

## 2025-08-17 PROCEDURE — 25000003 PHARM REV CODE 250: Performed by: PHYSICIAN ASSISTANT

## 2025-08-17 PROCEDURE — 85610 PROTHROMBIN TIME: CPT | Performed by: EMERGENCY MEDICINE

## 2025-08-17 PROCEDURE — 82962 GLUCOSE BLOOD TEST: CPT

## 2025-08-17 PROCEDURE — 85025 COMPLETE CBC W/AUTO DIFF WBC: CPT | Mod: 91 | Performed by: PHYSICIAN ASSISTANT

## 2025-08-17 PROCEDURE — 93005 ELECTROCARDIOGRAM TRACING: CPT

## 2025-08-17 PROCEDURE — 86901 BLOOD TYPING SEROLOGIC RH(D): CPT | Mod: 91 | Performed by: PHYSICIAN ASSISTANT

## 2025-08-17 PROCEDURE — 83605 ASSAY OF LACTIC ACID: CPT | Performed by: HOSPITALIST

## 2025-08-17 PROCEDURE — 36415 COLL VENOUS BLD VENIPUNCTURE: CPT | Performed by: PHYSICIAN ASSISTANT

## 2025-08-17 RX ORDER — OXYCODONE HYDROCHLORIDE 10 MG/1
10 TABLET ORAL EVERY 6 HOURS PRN
Refills: 0 | Status: DISCONTINUED | OUTPATIENT
Start: 2025-08-18 | End: 2025-08-22 | Stop reason: HOSPADM

## 2025-08-17 RX ORDER — METOPROLOL SUCCINATE 25 MG/1
25 TABLET, EXTENDED RELEASE ORAL DAILY
Status: DISCONTINUED | OUTPATIENT
Start: 2025-08-18 | End: 2025-08-22 | Stop reason: HOSPADM

## 2025-08-17 RX ORDER — IBUPROFEN 200 MG
24 TABLET ORAL
Status: DISCONTINUED | OUTPATIENT
Start: 2025-08-17 | End: 2025-08-22 | Stop reason: HOSPADM

## 2025-08-17 RX ORDER — AMIODARONE HYDROCHLORIDE 200 MG/1
200 TABLET ORAL DAILY
Status: DISCONTINUED | OUTPATIENT
Start: 2025-08-18 | End: 2025-08-22 | Stop reason: HOSPADM

## 2025-08-17 RX ORDER — ACETAMINOPHEN 500 MG
1000 TABLET ORAL EVERY 4 HOURS PRN
Status: DISCONTINUED | OUTPATIENT
Start: 2025-08-17 | End: 2025-08-20

## 2025-08-17 RX ORDER — ACETAMINOPHEN 325 MG/1
650 TABLET ORAL EVERY 4 HOURS PRN
Status: DISCONTINUED | OUTPATIENT
Start: 2025-08-17 | End: 2025-08-17

## 2025-08-17 RX ORDER — INSULIN ASPART 100 [IU]/ML
0-5 INJECTION, SOLUTION INTRAVENOUS; SUBCUTANEOUS
Status: DISCONTINUED | OUTPATIENT
Start: 2025-08-17 | End: 2025-08-19

## 2025-08-17 RX ORDER — OXYCODONE HYDROCHLORIDE 5 MG/1
5 TABLET ORAL EVERY 6 HOURS PRN
Refills: 0 | Status: DISCONTINUED | OUTPATIENT
Start: 2025-08-18 | End: 2025-08-22 | Stop reason: HOSPADM

## 2025-08-17 RX ORDER — PRAVASTATIN SODIUM 20 MG/1
40 TABLET ORAL DAILY
Status: DISCONTINUED | OUTPATIENT
Start: 2025-08-18 | End: 2025-08-22 | Stop reason: HOSPADM

## 2025-08-17 RX ORDER — GLUCAGON 1 MG
1 KIT INJECTION
Status: DISCONTINUED | OUTPATIENT
Start: 2025-08-17 | End: 2025-08-20

## 2025-08-17 RX ORDER — TAMSULOSIN HYDROCHLORIDE 0.4 MG/1
0.4 CAPSULE ORAL DAILY
Status: DISCONTINUED | OUTPATIENT
Start: 2025-08-18 | End: 2025-08-22 | Stop reason: HOSPADM

## 2025-08-17 RX ORDER — PANTOPRAZOLE SODIUM 40 MG/10ML
80 INJECTION, POWDER, LYOPHILIZED, FOR SOLUTION INTRAVENOUS
Status: COMPLETED | OUTPATIENT
Start: 2025-08-17 | End: 2025-08-17

## 2025-08-17 RX ORDER — LEVOTHYROXINE SODIUM 25 UG/1
25 TABLET ORAL
Status: DISCONTINUED | OUTPATIENT
Start: 2025-08-18 | End: 2025-08-22 | Stop reason: HOSPADM

## 2025-08-17 RX ORDER — ALLOPURINOL 100 MG/1
100 TABLET ORAL DAILY
COMMUNITY

## 2025-08-17 RX ORDER — ALLOPURINOL 100 MG/1
100 TABLET ORAL DAILY
Status: DISCONTINUED | OUTPATIENT
Start: 2025-08-18 | End: 2025-08-22 | Stop reason: HOSPADM

## 2025-08-17 RX ORDER — TALC
6 POWDER (GRAM) TOPICAL NIGHTLY PRN
Status: DISCONTINUED | OUTPATIENT
Start: 2025-08-17 | End: 2025-08-22 | Stop reason: HOSPADM

## 2025-08-17 RX ORDER — IBUPROFEN 200 MG
16 TABLET ORAL
Status: DISCONTINUED | OUTPATIENT
Start: 2025-08-17 | End: 2025-08-22 | Stop reason: HOSPADM

## 2025-08-17 RX ORDER — GABAPENTIN 100 MG/1
200 CAPSULE ORAL NIGHTLY
Status: DISCONTINUED | OUTPATIENT
Start: 2025-08-17 | End: 2025-08-20

## 2025-08-17 RX ORDER — PANTOPRAZOLE SODIUM 40 MG/10ML
40 INJECTION, POWDER, LYOPHILIZED, FOR SOLUTION INTRAVENOUS 2 TIMES DAILY
Status: DISCONTINUED | OUTPATIENT
Start: 2025-08-18 | End: 2025-08-17

## 2025-08-17 RX ADMIN — OXYCODONE HYDROCHLORIDE 10 MG: 10 TABLET ORAL at 11:08

## 2025-08-17 RX ADMIN — GABAPENTIN 200 MG: 100 CAPSULE ORAL at 11:08

## 2025-08-17 RX ADMIN — PANTOPRAZOLE SODIUM 80 MG: 40 INJECTION, POWDER, LYOPHILIZED, FOR SOLUTION INTRAVENOUS at 07:08

## 2025-08-18 ENCOUNTER — ANESTHESIA EVENT (OUTPATIENT)
Dept: ENDOSCOPY | Facility: HOSPITAL | Age: 75
End: 2025-08-18
Payer: MEDICARE

## 2025-08-18 ENCOUNTER — ANESTHESIA (OUTPATIENT)
Dept: ENDOSCOPY | Facility: HOSPITAL | Age: 75
End: 2025-08-18
Payer: MEDICARE

## 2025-08-18 PROBLEM — M25.532 LEFT WRIST PAIN: Status: ACTIVE | Noted: 2025-08-18

## 2025-08-18 PROBLEM — M25.532 ACUTE PAIN OF LEFT WRIST: Status: ACTIVE | Noted: 2025-08-18

## 2025-08-18 PROBLEM — K51.90 ULCERATIVE COLITIS IN REMISSION: Status: ACTIVE | Noted: 2025-08-18

## 2025-08-18 LAB
ABSOLUTE EOSINOPHIL (OHS): 0.03 K/UL
ABSOLUTE MONOCYTE (OHS): 0.74 K/UL (ref 0.3–1)
ABSOLUTE NEUTROPHIL COUNT (OHS): 6.39 K/UL (ref 1.8–7.7)
ANION GAP (OHS): 13 MMOL/L (ref 8–16)
BASOPHILS # BLD AUTO: 0.03 K/UL
BASOPHILS NFR BLD AUTO: 0.4 %
BUN SERPL-MCNC: 36 MG/DL (ref 8–23)
CALCIUM SERPL-MCNC: 8 MG/DL (ref 8.7–10.5)
CHLORIDE SERPL-SCNC: 104 MMOL/L (ref 95–110)
CO2 SERPL-SCNC: 20 MMOL/L (ref 23–29)
CREAT SERPL-MCNC: 6.8 MG/DL (ref 0.5–1.4)
CRP SERPL-MCNC: 27 MG/L
ERYTHROCYTE [DISTWIDTH] IN BLOOD BY AUTOMATED COUNT: 16.3 % (ref 11.5–14.5)
ERYTHROCYTE [SEDIMENTATION RATE] IN BLOOD BY PHOTOMETRIC METHOD: 76 MM/HR
GFR SERPLBLD CREATININE-BSD FMLA CKD-EPI: 8 ML/MIN/1.73/M2
GLUCOSE SERPL-MCNC: 54 MG/DL (ref 70–110)
HCT VFR BLD AUTO: 38.7 % (ref 40–54)
HGB BLD-MCNC: 11.8 GM/DL (ref 14–18)
IMM GRANULOCYTES # BLD AUTO: 0.06 K/UL (ref 0–0.04)
IMM GRANULOCYTES NFR BLD AUTO: 0.7 % (ref 0–0.5)
LYMPHOCYTES # BLD AUTO: 1.06 K/UL (ref 1–4.8)
MAGNESIUM SERPL-MCNC: 1.7 MG/DL (ref 1.6–2.6)
MCH RBC QN AUTO: 30.6 PG (ref 27–31)
MCHC RBC AUTO-ENTMCNC: 30.5 G/DL (ref 32–36)
MCV RBC AUTO: 101 FL (ref 82–98)
NUCLEATED RBC (/100WBC) (OHS): 0 /100 WBC
OHS QRS DURATION: 114 MS
OHS QTC CALCULATION: 495 MS
PHOSPHATE SERPL-MCNC: 4.9 MG/DL (ref 2.7–4.5)
PLATELET # BLD AUTO: 214 K/UL (ref 150–450)
PMV BLD AUTO: 9.6 FL (ref 9.2–12.9)
POCT GLUCOSE: 101 MG/DL (ref 70–110)
POCT GLUCOSE: 108 MG/DL (ref 70–110)
POCT GLUCOSE: 109 MG/DL (ref 70–110)
POCT GLUCOSE: 53 MG/DL (ref 70–110)
POCT GLUCOSE: 55 MG/DL (ref 70–110)
POCT GLUCOSE: 57 MG/DL (ref 70–110)
POCT GLUCOSE: 57 MG/DL (ref 70–110)
POCT GLUCOSE: 60 MG/DL (ref 70–110)
POCT GLUCOSE: 62 MG/DL (ref 70–110)
POCT GLUCOSE: 65 MG/DL (ref 70–110)
POCT GLUCOSE: 65 MG/DL (ref 70–110)
POCT GLUCOSE: 77 MG/DL (ref 70–110)
POCT GLUCOSE: 87 MG/DL (ref 70–110)
POTASSIUM SERPL-SCNC: 4.5 MMOL/L (ref 3.5–5.1)
PROCALCITONIN SERPL-MCNC: 0.53 NG/ML
RBC # BLD AUTO: 3.85 M/UL (ref 4.6–6.2)
RELATIVE EOSINOPHIL (OHS): 0.4 %
RELATIVE LYMPHOCYTE (OHS): 12.8 % (ref 18–48)
RELATIVE MONOCYTE (OHS): 8.9 % (ref 4–15)
RELATIVE NEUTROPHIL (OHS): 76.8 % (ref 38–73)
SODIUM SERPL-SCNC: 137 MMOL/L (ref 136–145)
WBC # BLD AUTO: 8.31 K/UL (ref 3.9–12.7)

## 2025-08-18 PROCEDURE — A9585 GADOBUTROL INJECTION: HCPCS | Performed by: HOSPITALIST

## 2025-08-18 PROCEDURE — 85025 COMPLETE CBC W/AUTO DIFF WBC: CPT | Performed by: PHYSICIAN ASSISTANT

## 2025-08-18 PROCEDURE — 63600175 PHARM REV CODE 636 W HCPCS

## 2025-08-18 PROCEDURE — 87102 FUNGUS ISOLATION CULTURE: CPT

## 2025-08-18 PROCEDURE — 87206 SMEAR FLUORESCENT/ACID STAI: CPT

## 2025-08-18 PROCEDURE — 84100 ASSAY OF PHOSPHORUS: CPT | Performed by: PHYSICIAN ASSISTANT

## 2025-08-18 PROCEDURE — 44380 SMALL BOWEL ENDOSCOPY BR/WA: CPT | Mod: GC,,, | Performed by: STUDENT IN AN ORGANIZED HEALTH CARE EDUCATION/TRAINING PROGRAM

## 2025-08-18 PROCEDURE — 36415 COLL VENOUS BLD VENIPUNCTURE: CPT | Performed by: PHYSICIAN ASSISTANT

## 2025-08-18 PROCEDURE — G0378 HOSPITAL OBSERVATION PER HR: HCPCS

## 2025-08-18 PROCEDURE — 80048 BASIC METABOLIC PNL TOTAL CA: CPT | Performed by: PHYSICIAN ASSISTANT

## 2025-08-18 PROCEDURE — 25000003 PHARM REV CODE 250: Performed by: PHYSICIAN ASSISTANT

## 2025-08-18 PROCEDURE — 85060 BLOOD SMEAR INTERPRETATION: CPT | Mod: ,,, | Performed by: PATHOLOGY

## 2025-08-18 PROCEDURE — 85652 RBC SED RATE AUTOMATED: CPT | Performed by: PHYSICIAN ASSISTANT

## 2025-08-18 PROCEDURE — 87070 CULTURE OTHR SPECIMN AEROBIC: CPT

## 2025-08-18 PROCEDURE — 25000003 PHARM REV CODE 250: Performed by: NURSE ANESTHETIST, CERTIFIED REGISTERED

## 2025-08-18 PROCEDURE — 37000008 HC ANESTHESIA 1ST 15 MINUTES: Performed by: STUDENT IN AN ORGANIZED HEALTH CARE EDUCATION/TRAINING PROGRAM

## 2025-08-18 PROCEDURE — 84145 PROCALCITONIN (PCT): CPT | Performed by: PHYSICIAN ASSISTANT

## 2025-08-18 PROCEDURE — 37000009 HC ANESTHESIA EA ADD 15 MINS: Performed by: STUDENT IN AN ORGANIZED HEALTH CARE EDUCATION/TRAINING PROGRAM

## 2025-08-18 PROCEDURE — 99222 1ST HOSP IP/OBS MODERATE 55: CPT | Mod: 25,GC,, | Performed by: STUDENT IN AN ORGANIZED HEALTH CARE EDUCATION/TRAINING PROGRAM

## 2025-08-18 PROCEDURE — 25000003 PHARM REV CODE 250

## 2025-08-18 PROCEDURE — 89060 EXAM SYNOVIAL FLUID CRYSTALS: CPT

## 2025-08-18 PROCEDURE — 25500020 PHARM REV CODE 255: Performed by: HOSPITALIST

## 2025-08-18 PROCEDURE — 89051 BODY FLUID CELL COUNT: CPT

## 2025-08-18 PROCEDURE — 87116 MYCOBACTERIA CULTURE: CPT

## 2025-08-18 PROCEDURE — 63600175 PHARM REV CODE 636 W HCPCS: Performed by: PHYSICIAN ASSISTANT

## 2025-08-18 PROCEDURE — 86141 C-REACTIVE PROTEIN HS: CPT | Performed by: PHYSICIAN ASSISTANT

## 2025-08-18 PROCEDURE — 94761 N-INVAS EAR/PLS OXIMETRY MLT: CPT

## 2025-08-18 PROCEDURE — 63600175 PHARM REV CODE 636 W HCPCS: Performed by: NURSE ANESTHETIST, CERTIFIED REGISTERED

## 2025-08-18 PROCEDURE — 44380 SMALL BOWEL ENDOSCOPY BR/WA: CPT | Performed by: STUDENT IN AN ORGANIZED HEALTH CARE EDUCATION/TRAINING PROGRAM

## 2025-08-18 PROCEDURE — 87205 SMEAR GRAM STAIN: CPT

## 2025-08-18 PROCEDURE — 99214 OFFICE O/P EST MOD 30 MIN: CPT | Mod: GC,,, | Performed by: INTERNAL MEDICINE

## 2025-08-18 PROCEDURE — 0R9P3ZX DRAINAGE OF LEFT WRIST JOINT, PERCUTANEOUS APPROACH, DIAGNOSTIC: ICD-10-PCS | Performed by: ORTHOPAEDIC SURGERY

## 2025-08-18 PROCEDURE — 0DJD8ZZ INSPECTION OF LOWER INTESTINAL TRACT, VIA NATURAL OR ARTIFICIAL OPENING ENDOSCOPIC: ICD-10-PCS | Performed by: STUDENT IN AN ORGANIZED HEALTH CARE EDUCATION/TRAINING PROGRAM

## 2025-08-18 PROCEDURE — 96375 TX/PRO/DX INJ NEW DRUG ADDON: CPT

## 2025-08-18 PROCEDURE — 82962 GLUCOSE BLOOD TEST: CPT | Performed by: STUDENT IN AN ORGANIZED HEALTH CARE EDUCATION/TRAINING PROGRAM

## 2025-08-18 PROCEDURE — 96376 TX/PRO/DX INJ SAME DRUG ADON: CPT

## 2025-08-18 PROCEDURE — 87075 CULTR BACTERIA EXCEPT BLOOD: CPT

## 2025-08-18 PROCEDURE — 83735 ASSAY OF MAGNESIUM: CPT | Performed by: PHYSICIAN ASSISTANT

## 2025-08-18 RX ORDER — PANTOPRAZOLE SODIUM 40 MG/10ML
40 INJECTION, POWDER, LYOPHILIZED, FOR SOLUTION INTRAVENOUS 2 TIMES DAILY
Status: DISCONTINUED | OUTPATIENT
Start: 2025-08-18 | End: 2025-08-20

## 2025-08-18 RX ORDER — PHENYLEPHRINE HYDROCHLORIDE 10 MG/ML
INJECTION INTRAVENOUS
Status: DISCONTINUED | OUTPATIENT
Start: 2025-08-18 | End: 2025-08-18

## 2025-08-18 RX ORDER — GADOBUTROL 604.72 MG/ML
9 INJECTION INTRAVENOUS
Status: COMPLETED | OUTPATIENT
Start: 2025-08-18 | End: 2025-08-18

## 2025-08-18 RX ORDER — LIDOCAINE HYDROCHLORIDE 20 MG/ML
INJECTION, SOLUTION EPIDURAL; INFILTRATION; INTRACAUDAL; PERINEURAL
Status: DISCONTINUED | OUTPATIENT
Start: 2025-08-18 | End: 2025-08-18

## 2025-08-18 RX ORDER — SODIUM CHLORIDE 9 MG/ML
INJECTION, SOLUTION INTRAVENOUS ONCE
OUTPATIENT
Start: 2025-08-18 | End: 2025-08-18

## 2025-08-18 RX ORDER — SODIUM CHLORIDE 0.9 % (FLUSH) 0.9 %
10 SYRINGE (ML) INJECTION
Status: DISCONTINUED | OUTPATIENT
Start: 2025-08-18 | End: 2025-08-18 | Stop reason: HOSPADM

## 2025-08-18 RX ORDER — GLUCAGON 1 MG
1 KIT INJECTION
Status: DISCONTINUED | OUTPATIENT
Start: 2025-08-18 | End: 2025-08-18 | Stop reason: HOSPADM

## 2025-08-18 RX ORDER — PROPOFOL 10 MG/ML
VIAL (ML) INTRAVENOUS
Status: DISCONTINUED | OUTPATIENT
Start: 2025-08-18 | End: 2025-08-18

## 2025-08-18 RX ORDER — SODIUM CHLORIDE 9 MG/ML
INJECTION, SOLUTION INTRAVENOUS
OUTPATIENT
Start: 2025-08-18

## 2025-08-18 RX ORDER — MUPIROCIN 20 MG/G
OINTMENT TOPICAL 2 TIMES DAILY
OUTPATIENT
Start: 2025-08-18 | End: 2025-08-23

## 2025-08-18 RX ORDER — DEXMEDETOMIDINE HYDROCHLORIDE 100 UG/ML
INJECTION, SOLUTION INTRAVENOUS
Status: DISCONTINUED | OUTPATIENT
Start: 2025-08-18 | End: 2025-08-18

## 2025-08-18 RX ORDER — MORPHINE SULFATE 4 MG/ML
2 INJECTION, SOLUTION INTRAMUSCULAR; INTRAVENOUS EVERY 6 HOURS PRN
Refills: 0 | Status: DISCONTINUED | OUTPATIENT
Start: 2025-08-18 | End: 2025-08-19

## 2025-08-18 RX ADMIN — LIDOCAINE HYDROCHLORIDE 50 MG: 20 INJECTION, SOLUTION EPIDURAL; INFILTRATION; INTRACAUDAL; PERINEURAL at 01:08

## 2025-08-18 RX ADMIN — ALLOPURINOL 100 MG: 100 TABLET ORAL at 09:08

## 2025-08-18 RX ADMIN — TAMSULOSIN HYDROCHLORIDE 0.4 MG: 0.4 CAPSULE ORAL at 09:08

## 2025-08-18 RX ADMIN — DEXTROSE MONOHYDRATE 12.5 G: 25 INJECTION, SOLUTION INTRAVENOUS at 09:08

## 2025-08-18 RX ADMIN — AMIODARONE HYDROCHLORIDE 200 MG: 200 TABLET ORAL at 09:08

## 2025-08-18 RX ADMIN — PROPOFOL 110 MCG/KG/MIN: 10 INJECTION, EMULSION INTRAVENOUS at 01:08

## 2025-08-18 RX ADMIN — DEXMEDETOMIDINE 8 MCG: 100 INJECTION, SOLUTION, CONCENTRATE INTRAVENOUS at 01:08

## 2025-08-18 RX ADMIN — GADOBUTROL 9 ML: 604.72 INJECTION INTRAVENOUS at 07:08

## 2025-08-18 RX ADMIN — PHENYLEPHRINE HYDROCHLORIDE 100 MCG: 10 INJECTION INTRAVENOUS at 01:08

## 2025-08-18 RX ADMIN — Medication 16 G: at 02:08

## 2025-08-18 RX ADMIN — LEVOTHYROXINE SODIUM 25 MCG: 0.03 TABLET ORAL at 05:08

## 2025-08-18 RX ADMIN — PRAVASTATIN SODIUM 40 MG: 40 TABLET ORAL at 09:08

## 2025-08-18 RX ADMIN — PANTOPRAZOLE SODIUM 40 MG: 40 INJECTION, POWDER, FOR SOLUTION INTRAVENOUS at 09:08

## 2025-08-18 RX ADMIN — Medication 16 G: at 09:08

## 2025-08-18 RX ADMIN — MORPHINE SULFATE 2 MG: 4 INJECTION INTRAVENOUS at 01:08

## 2025-08-18 RX ADMIN — METOPROLOL SUCCINATE 25 MG: 25 TABLET, EXTENDED RELEASE ORAL at 09:08

## 2025-08-18 RX ADMIN — DEXTROSE MONOHYDRATE 12.5 G: 25 INJECTION, SOLUTION INTRAVENOUS at 10:08

## 2025-08-18 RX ADMIN — OXYCODONE HYDROCHLORIDE 10 MG: 10 TABLET ORAL at 05:08

## 2025-08-18 RX ADMIN — OXYCODONE HYDROCHLORIDE 10 MG: 10 TABLET ORAL at 03:08

## 2025-08-18 RX ADMIN — SODIUM CHLORIDE: 0.9 INJECTION, SOLUTION INTRAVENOUS at 01:08

## 2025-08-18 RX ADMIN — GABAPENTIN 200 MG: 100 CAPSULE ORAL at 09:08

## 2025-08-18 RX ADMIN — PROPOFOL 50 MG: 10 INJECTION, EMULSION INTRAVENOUS at 01:08

## 2025-08-18 RX ADMIN — MORPHINE SULFATE 2 MG: 4 INJECTION INTRAVENOUS at 09:08

## 2025-08-19 PROBLEM — K92.2 GASTROINTESTINAL HEMORRHAGE, UNSPECIFIED GASTROINTESTINAL HEMORRHAGE TYPE: Status: ACTIVE | Noted: 2025-08-19

## 2025-08-19 LAB
ABSOLUTE EOSINOPHIL (OHS): 0.04 K/UL
ABSOLUTE MONOCYTE (OHS): 0.93 K/UL (ref 0.3–1)
ABSOLUTE NEUTROPHIL COUNT (OHS): 6.76 K/UL (ref 1.8–7.7)
ACID FAST MOD KINY STN SPEC: NORMAL
ANION GAP (OHS): 13 MMOL/L (ref 8–16)
APPEARANCE FLD: NORMAL
BASOPHILS # BLD AUTO: 0.04 K/UL
BASOPHILS NFR BLD AUTO: 0.4 %
BILIRUB UR QL STRIP.AUTO: NEGATIVE
BUN SERPL-MCNC: 37 MG/DL (ref 8–23)
CALCIUM SERPL-MCNC: 7.9 MG/DL (ref 8.7–10.5)
CHLORIDE SERPL-SCNC: 101 MMOL/L (ref 95–110)
CLARITY UR: CLEAR
CO2 SERPL-SCNC: 21 MMOL/L (ref 23–29)
COLOR FLD: NORMAL
COLOR UR AUTO: YELLOW
CORTIS SERPL-MCNC: 12.2 UG/DL (ref 4.3–22.4)
CREAT SERPL-MCNC: 7.5 MG/DL (ref 0.5–1.4)
CRYSTAL, BODY FLUID (OHS): NORMAL
EOSINOPHIL NFR FLD MANUAL: 2 %
ERYTHROCYTE [DISTWIDTH] IN BLOOD BY AUTOMATED COUNT: 16.4 % (ref 11.5–14.5)
FUNGUS SPEC CULT: NORMAL
GFR SERPLBLD CREATININE-BSD FMLA CKD-EPI: 7 ML/MIN/1.73/M2
GLUCOSE SERPL-MCNC: 34 MG/DL (ref 70–110)
GLUCOSE UR QL STRIP: NEGATIVE
GRAM STN SPEC: NORMAL
GRAM STN SPEC: NORMAL
HCT VFR BLD AUTO: 35.2 % (ref 40–54)
HGB BLD-MCNC: 10.8 GM/DL (ref 14–18)
HGB UR QL STRIP: NEGATIVE
IMM GRANULOCYTES # BLD AUTO: 0.05 K/UL (ref 0–0.04)
IMM GRANULOCYTES NFR BLD AUTO: 0.6 % (ref 0–0.5)
KETONES UR QL STRIP: NEGATIVE
LEUKOCYTE ESTERASE UR QL STRIP: NEGATIVE
LYMPHOCYTES # BLD AUTO: 1.2 K/UL (ref 1–4.8)
LYMPHOCYTES NFR FLD MANUAL: 9 %
MAGNESIUM SERPL-MCNC: 1.5 MG/DL (ref 1.6–2.6)
MCH RBC QN AUTO: 31.1 PG (ref 27–31)
MCHC RBC AUTO-ENTMCNC: 30.7 G/DL (ref 32–36)
MCV RBC AUTO: 101 FL (ref 82–98)
MONOS+MACROS NFR FLD MANUAL: 3 %
NEUTROPHILS NFR FLD MANUAL: 86 %
NITRITE UR QL STRIP: NEGATIVE
NUCLEATED RBC (/100WBC) (OHS): 0 /100 WBC
OHS QRS DURATION: 138 MS
OHS QTC CALCULATION: 518 MS
PATHOLOGIST REVIEW - CRYSTALS BF (OHS): NORMAL
PH UR STRIP: 7 [PH]
PHOSPHATE SERPL-MCNC: 5.1 MG/DL (ref 2.7–4.5)
PLATELET # BLD AUTO: 203 K/UL (ref 150–450)
PMV BLD AUTO: 9 FL (ref 9.2–12.9)
POCT GLUCOSE: 111 MG/DL (ref 70–110)
POCT GLUCOSE: 118 MG/DL (ref 70–110)
POCT GLUCOSE: 118 MG/DL (ref 70–110)
POCT GLUCOSE: 121 MG/DL (ref 70–110)
POCT GLUCOSE: 134 MG/DL (ref 70–110)
POCT GLUCOSE: 46 MG/DL (ref 70–110)
POCT GLUCOSE: 47 MG/DL (ref 70–110)
POCT GLUCOSE: 47 MG/DL (ref 70–110)
POCT GLUCOSE: 55 MG/DL (ref 70–110)
POCT GLUCOSE: 61 MG/DL (ref 70–110)
POCT GLUCOSE: 65 MG/DL (ref 70–110)
POCT GLUCOSE: 74 MG/DL (ref 70–110)
POCT GLUCOSE: 84 MG/DL (ref 70–110)
POCT GLUCOSE: 91 MG/DL (ref 70–110)
POCT GLUCOSE: 95 MG/DL (ref 70–110)
POCT GLUCOSE: 98 MG/DL (ref 70–110)
POTASSIUM SERPL-SCNC: 5.2 MMOL/L (ref 3.5–5.1)
PROT UR QL STRIP: ABNORMAL
RBC # BLD AUTO: 3.47 M/UL (ref 4.6–6.2)
RELATIVE EOSINOPHIL (OHS): 0.4 %
RELATIVE LYMPHOCYTE (OHS): 13.3 % (ref 18–48)
RELATIVE MONOCYTE (OHS): 10.3 % (ref 4–15)
RELATIVE NEUTROPHIL (OHS): 75 % (ref 38–73)
SODIUM SERPL-SCNC: 135 MMOL/L (ref 136–145)
SP GR UR STRIP: 1.01
UROBILINOGEN UR STRIP-ACNC: NEGATIVE EU/DL
WBC # BLD AUTO: 9.02 K/UL (ref 3.9–12.7)
WBC # FLD: 4365 /CU MM

## 2025-08-19 PROCEDURE — 83735 ASSAY OF MAGNESIUM: CPT | Performed by: PHYSICIAN ASSISTANT

## 2025-08-19 PROCEDURE — 25000003 PHARM REV CODE 250: Performed by: PHYSICIAN ASSISTANT

## 2025-08-19 PROCEDURE — 80377 DRUG/SUBSTANCE NOS 7/MORE: CPT

## 2025-08-19 PROCEDURE — 81003 URINALYSIS AUTO W/O SCOPE: CPT | Performed by: PHYSICIAN ASSISTANT

## 2025-08-19 PROCEDURE — 99292 CRITICAL CARE ADDL 30 MIN: CPT | Mod: FS,,, | Performed by: PHYSICIAN ASSISTANT

## 2025-08-19 PROCEDURE — 63600175 PHARM REV CODE 636 W HCPCS: Performed by: PHYSICIAN ASSISTANT

## 2025-08-19 PROCEDURE — 20000000 HC ICU ROOM

## 2025-08-19 PROCEDURE — 87040 BLOOD CULTURE FOR BACTERIA: CPT | Performed by: PHYSICIAN ASSISTANT

## 2025-08-19 PROCEDURE — 99223 1ST HOSP IP/OBS HIGH 75: CPT | Mod: GC,,,

## 2025-08-19 PROCEDURE — 99222 1ST HOSP IP/OBS MODERATE 55: CPT | Mod: NSCH,GC,, | Performed by: STUDENT IN AN ORGANIZED HEALTH CARE EDUCATION/TRAINING PROGRAM

## 2025-08-19 PROCEDURE — 25000003 PHARM REV CODE 250

## 2025-08-19 PROCEDURE — 96375 TX/PRO/DX INJ NEW DRUG ADDON: CPT

## 2025-08-19 PROCEDURE — 36415 COLL VENOUS BLD VENIPUNCTURE: CPT | Performed by: PHYSICIAN ASSISTANT

## 2025-08-19 PROCEDURE — 93010 ELECTROCARDIOGRAM REPORT: CPT | Mod: ,,, | Performed by: INTERNAL MEDICINE

## 2025-08-19 PROCEDURE — 84100 ASSAY OF PHOSPHORUS: CPT | Performed by: PHYSICIAN ASSISTANT

## 2025-08-19 PROCEDURE — 96376 TX/PRO/DX INJ SAME DRUG ADON: CPT

## 2025-08-19 PROCEDURE — 63600175 PHARM REV CODE 636 W HCPCS: Performed by: INTERNAL MEDICINE

## 2025-08-19 PROCEDURE — 63600175 PHARM REV CODE 636 W HCPCS

## 2025-08-19 PROCEDURE — 94761 N-INVAS EAR/PLS OXIMETRY MLT: CPT

## 2025-08-19 PROCEDURE — 96367 TX/PROPH/DG ADDL SEQ IV INF: CPT

## 2025-08-19 PROCEDURE — 99291 CRITICAL CARE FIRST HOUR: CPT | Mod: FS,,, | Performed by: PHYSICIAN ASSISTANT

## 2025-08-19 PROCEDURE — 99499 UNLISTED E&M SERVICE: CPT | Mod: ,,, | Performed by: INTERNAL MEDICINE

## 2025-08-19 PROCEDURE — 96374 THER/PROPH/DIAG INJ IV PUSH: CPT | Mod: XS

## 2025-08-19 PROCEDURE — 82533 TOTAL CORTISOL: CPT | Performed by: INTERNAL MEDICINE

## 2025-08-19 PROCEDURE — 5A1D70Z PERFORMANCE OF URINARY FILTRATION, INTERMITTENT, LESS THAN 6 HOURS PER DAY: ICD-10-PCS | Performed by: STUDENT IN AN ORGANIZED HEALTH CARE EDUCATION/TRAINING PROGRAM

## 2025-08-19 PROCEDURE — 80100014 HC HEMODIALYSIS 1:1

## 2025-08-19 PROCEDURE — 25000003 PHARM REV CODE 250: Performed by: INTERNAL MEDICINE

## 2025-08-19 PROCEDURE — 96366 THER/PROPH/DIAG IV INF ADDON: CPT

## 2025-08-19 PROCEDURE — 96365 THER/PROPH/DIAG IV INF INIT: CPT

## 2025-08-19 RX ORDER — OCTREOTIDE ACETATE 100 UG/ML
100 INJECTION, SOLUTION INTRAVENOUS; SUBCUTANEOUS ONCE
Status: COMPLETED | OUTPATIENT
Start: 2025-08-19 | End: 2025-08-19

## 2025-08-19 RX ORDER — PREDNISONE 20 MG/1
20 TABLET ORAL 2 TIMES DAILY
Status: DISCONTINUED | OUTPATIENT
Start: 2025-08-19 | End: 2025-08-22 | Stop reason: HOSPADM

## 2025-08-19 RX ORDER — OCTREOTIDE ACETATE 100 UG/ML
100 INJECTION, SOLUTION INTRAVENOUS; SUBCUTANEOUS ONCE
Status: DISCONTINUED | OUTPATIENT
Start: 2025-08-19 | End: 2025-08-19

## 2025-08-19 RX ORDER — HEPARIN SODIUM 1000 [USP'U]/ML
1000 INJECTION, SOLUTION INTRAVENOUS; SUBCUTANEOUS
Status: ACTIVE | OUTPATIENT
Start: 2025-08-19 | End: 2025-08-20

## 2025-08-19 RX ORDER — DEXTROSE MONOHYDRATE 100 MG/ML
INJECTION, SOLUTION INTRAVENOUS CONTINUOUS
Status: DISCONTINUED | OUTPATIENT
Start: 2025-08-19 | End: 2025-08-19

## 2025-08-19 RX ORDER — DEXTROSE 20 G/100ML
INJECTION, SOLUTION INTRAVENOUS CONTINUOUS
Status: DISCONTINUED | OUTPATIENT
Start: 2025-08-19 | End: 2025-08-19

## 2025-08-19 RX ORDER — SODIUM CHLORIDE 9 MG/ML
INJECTION, SOLUTION INTRAVENOUS ONCE
Status: COMPLETED | OUTPATIENT
Start: 2025-08-19 | End: 2025-08-19

## 2025-08-19 RX ORDER — HYDROMORPHONE HYDROCHLORIDE 1 MG/ML
0.5 INJECTION, SOLUTION INTRAMUSCULAR; INTRAVENOUS; SUBCUTANEOUS EVERY 6 HOURS PRN
Status: DISCONTINUED | OUTPATIENT
Start: 2025-08-19 | End: 2025-08-22 | Stop reason: HOSPADM

## 2025-08-19 RX ORDER — PREDNISONE 10 MG/1
10 TABLET ORAL 2 TIMES DAILY
Status: DISCONTINUED | OUTPATIENT
Start: 2025-08-27 | End: 2025-08-22 | Stop reason: HOSPADM

## 2025-08-19 RX ORDER — PREDNISONE 2.5 MG/1
5 TABLET ORAL 2 TIMES DAILY
Status: DISCONTINUED | OUTPATIENT
Start: 2025-08-30 | End: 2025-08-22 | Stop reason: HOSPADM

## 2025-08-19 RX ORDER — PREDNISONE 2.5 MG/1
5 TABLET ORAL DAILY
Status: DISCONTINUED | OUTPATIENT
Start: 2025-09-03 | End: 2025-08-22 | Stop reason: HOSPADM

## 2025-08-19 RX ORDER — MUPIROCIN 20 MG/G
OINTMENT TOPICAL 2 TIMES DAILY
Status: CANCELLED | OUTPATIENT
Start: 2025-08-19 | End: 2025-08-24

## 2025-08-19 RX ORDER — OCTREOTIDE ACETATE 1000 UG/ML
100 INJECTION INTRAVENOUS ONCE
Status: DISCONTINUED | OUTPATIENT
Start: 2025-08-19 | End: 2025-08-19

## 2025-08-19 RX ORDER — DEXTROSE MONOHYDRATE 100 MG/ML
INJECTION, SOLUTION INTRAVENOUS CONTINUOUS
Status: DISCONTINUED | OUTPATIENT
Start: 2025-08-19 | End: 2025-08-20

## 2025-08-19 RX ORDER — SODIUM CHLORIDE 9 MG/ML
INJECTION, SOLUTION INTRAVENOUS
Status: DISCONTINUED | OUTPATIENT
Start: 2025-08-19 | End: 2025-08-22 | Stop reason: HOSPADM

## 2025-08-19 RX ORDER — COLCHICINE 0.6 MG/1
0.6 TABLET, FILM COATED ORAL DAILY
Status: CANCELLED | OUTPATIENT
Start: 2025-08-19

## 2025-08-19 RX ORDER — HYDROMORPHONE HYDROCHLORIDE 1 MG/ML
0.2 INJECTION, SOLUTION INTRAMUSCULAR; INTRAVENOUS; SUBCUTANEOUS EVERY 6 HOURS PRN
Status: DISCONTINUED | OUTPATIENT
Start: 2025-08-19 | End: 2025-08-19

## 2025-08-19 RX ORDER — MAGNESIUM SULFATE 1 G/100ML
1 INJECTION INTRAVENOUS ONCE
Status: COMPLETED | OUTPATIENT
Start: 2025-08-19 | End: 2025-08-19

## 2025-08-19 RX ADMIN — DEXTROSE MONOHYDRATE 12.5 G: 25 INJECTION, SOLUTION INTRAVENOUS at 11:08

## 2025-08-19 RX ADMIN — DEXTROSE MONOHYDRATE: 100 INJECTION, SOLUTION INTRAVENOUS at 04:08

## 2025-08-19 RX ADMIN — GABAPENTIN 200 MG: 100 CAPSULE ORAL at 08:08

## 2025-08-19 RX ADMIN — DEXTROSE MONOHYDRATE: 100 INJECTION, SOLUTION INTRAVENOUS at 10:08

## 2025-08-19 RX ADMIN — ACETAMINOPHEN 1000 MG: 500 TABLET ORAL at 05:08

## 2025-08-19 RX ADMIN — OXYCODONE HYDROCHLORIDE 10 MG: 10 TABLET ORAL at 08:08

## 2025-08-19 RX ADMIN — PIPERACILLIN AND TAZOBACTAM 4.5 G: 4; .5 INJECTION, POWDER, LYOPHILIZED, FOR SOLUTION INTRAVENOUS; PARENTERAL at 11:08

## 2025-08-19 RX ADMIN — SODIUM CHLORIDE: 9 INJECTION, SOLUTION INTRAVENOUS at 05:08

## 2025-08-19 RX ADMIN — DEXTROSE MONOHYDRATE 12.5 G: 25 INJECTION, SOLUTION INTRAVENOUS at 09:08

## 2025-08-19 RX ADMIN — MAGNESIUM SULFATE HEPTAHYDRATE 1 G: 500 INJECTION, SOLUTION INTRAMUSCULAR; INTRAVENOUS at 08:08

## 2025-08-19 RX ADMIN — PANTOPRAZOLE SODIUM 40 MG: 40 INJECTION, POWDER, FOR SOLUTION INTRAVENOUS at 08:08

## 2025-08-19 RX ADMIN — TAMSULOSIN HYDROCHLORIDE 0.4 MG: 0.4 CAPSULE ORAL at 08:08

## 2025-08-19 RX ADMIN — HYDROMORPHONE HYDROCHLORIDE 0.5 MG: 1 INJECTION, SOLUTION INTRAMUSCULAR; INTRAVENOUS; SUBCUTANEOUS at 05:08

## 2025-08-19 RX ADMIN — DEXTROSE MONOHYDRATE 25 G: 25 INJECTION, SOLUTION INTRAVENOUS at 04:08

## 2025-08-19 RX ADMIN — VANCOMYCIN HYDROCHLORIDE 2000 MG: 5 INJECTION, POWDER, LYOPHILIZED, FOR SOLUTION INTRAVENOUS at 09:08

## 2025-08-19 RX ADMIN — HYDROMORPHONE HYDROCHLORIDE 0.5 MG: 1 INJECTION, SOLUTION INTRAMUSCULAR; INTRAVENOUS; SUBCUTANEOUS at 10:08

## 2025-08-19 RX ADMIN — OXYCODONE HYDROCHLORIDE 10 MG: 10 TABLET ORAL at 12:08

## 2025-08-19 RX ADMIN — APIXABAN 5 MG: 5 TABLET, FILM COATED ORAL at 08:08

## 2025-08-19 RX ADMIN — PRAVASTATIN SODIUM 40 MG: 40 TABLET ORAL at 08:08

## 2025-08-19 RX ADMIN — METOPROLOL SUCCINATE 25 MG: 25 TABLET, EXTENDED RELEASE ORAL at 08:08

## 2025-08-19 RX ADMIN — DEXTROSE MONOHYDRATE: 100 INJECTION, SOLUTION INTRAVENOUS at 01:08

## 2025-08-19 RX ADMIN — AMIODARONE HYDROCHLORIDE 200 MG: 200 TABLET ORAL at 08:08

## 2025-08-19 RX ADMIN — HYDROMORPHONE HYDROCHLORIDE 0.2 MG: 1 INJECTION, SOLUTION INTRAMUSCULAR; INTRAVENOUS; SUBCUTANEOUS at 12:08

## 2025-08-19 RX ADMIN — DEXTROSE MONOHYDRATE: 100 INJECTION, SOLUTION INTRAVENOUS at 07:08

## 2025-08-19 RX ADMIN — ALLOPURINOL 100 MG: 100 TABLET ORAL at 08:08

## 2025-08-19 RX ADMIN — OCTREOTIDE ACETATE 100 MCG: 100 INJECTION, SOLUTION INTRAVENOUS; SUBCUTANEOUS at 10:08

## 2025-08-19 RX ADMIN — HEPARIN SODIUM 1000 UNITS: 1000 INJECTION INTRAVENOUS; SUBCUTANEOUS at 05:08

## 2025-08-19 RX ADMIN — DEXTROSE MONOHYDRATE 25 G: 25 INJECTION, SOLUTION INTRAVENOUS at 06:08

## 2025-08-19 RX ADMIN — PREDNISONE 20 MG: 20 TABLET ORAL at 02:08

## 2025-08-19 RX ADMIN — DEXTROSE MONOHYDRATE 12.5 G: 25 INJECTION, SOLUTION INTRAVENOUS at 02:08

## 2025-08-20 LAB
ABSOLUTE EOSINOPHIL (OHS): 0 K/UL
ABSOLUTE MONOCYTE (OHS): 0.39 K/UL (ref 0.3–1)
ABSOLUTE NEUTROPHIL COUNT (OHS): 9.09 K/UL (ref 1.8–7.7)
ALBUMIN SERPL BCP-MCNC: 1.9 G/DL (ref 3.5–5.2)
ALP SERPL-CCNC: 100 UNIT/L (ref 40–150)
ALT SERPL W/O P-5'-P-CCNC: <8 UNIT/L (ref 0–55)
ANION GAP (OHS): 9 MMOL/L (ref 8–16)
AST SERPL-CCNC: 22 UNIT/L (ref 0–50)
BASOPHILS # BLD AUTO: 0.01 K/UL
BASOPHILS NFR BLD AUTO: 0.1 %
BILIRUB SERPL-MCNC: 0.9 MG/DL (ref 0.1–1)
BUN SERPL-MCNC: 14 MG/DL (ref 8–23)
CALCIUM SERPL-MCNC: 7.7 MG/DL (ref 8.7–10.5)
CHLORIDE SERPL-SCNC: 96 MMOL/L (ref 95–110)
CO2 SERPL-SCNC: 22 MMOL/L (ref 23–29)
CREAT SERPL-MCNC: 4.1 MG/DL (ref 0.5–1.4)
ERYTHROCYTE [DISTWIDTH] IN BLOOD BY AUTOMATED COUNT: 15.9 % (ref 11.5–14.5)
GFR SERPLBLD CREATININE-BSD FMLA CKD-EPI: 14 ML/MIN/1.73/M2
GLUCOSE SERPL-MCNC: 192 MG/DL (ref 70–110)
HCT VFR BLD AUTO: 33.2 % (ref 40–54)
HGB BLD-MCNC: 10.4 GM/DL (ref 14–18)
HOLD SPECIMEN: NORMAL
IMM GRANULOCYTES # BLD AUTO: 0.05 K/UL (ref 0–0.04)
IMM GRANULOCYTES NFR BLD AUTO: 0.5 % (ref 0–0.5)
LYMPHOCYTES # BLD AUTO: 0.54 K/UL (ref 1–4.8)
MAGNESIUM SERPL-MCNC: 1.6 MG/DL (ref 1.6–2.6)
MCH RBC QN AUTO: 30.8 PG (ref 27–31)
MCHC RBC AUTO-ENTMCNC: 31.3 G/DL (ref 32–36)
MCV RBC AUTO: 98 FL (ref 82–98)
NUCLEATED RBC (/100WBC) (OHS): 0 /100 WBC
PHOSPHATE SERPL-MCNC: 4.2 MG/DL (ref 2.7–4.5)
PLATELET # BLD AUTO: 191 K/UL (ref 150–450)
PMV BLD AUTO: 9.9 FL (ref 9.2–12.9)
POCT GLUCOSE: 103 MG/DL (ref 70–110)
POCT GLUCOSE: 105 MG/DL (ref 70–110)
POCT GLUCOSE: 122 MG/DL (ref 70–110)
POCT GLUCOSE: 146 MG/DL (ref 70–110)
POCT GLUCOSE: 149 MG/DL (ref 70–110)
POCT GLUCOSE: 214 MG/DL (ref 70–110)
POCT GLUCOSE: 224 MG/DL (ref 70–110)
POCT GLUCOSE: 243 MG/DL (ref 70–110)
POCT GLUCOSE: 50 MG/DL (ref 70–110)
POCT GLUCOSE: 91 MG/DL (ref 70–110)
POTASSIUM SERPL-SCNC: 4.7 MMOL/L (ref 3.5–5.1)
PROT SERPL-MCNC: 5 GM/DL (ref 6–8.4)
RBC # BLD AUTO: 3.38 M/UL (ref 4.6–6.2)
RELATIVE EOSINOPHIL (OHS): 0 %
RELATIVE LYMPHOCYTE (OHS): 5.4 % (ref 18–48)
RELATIVE MONOCYTE (OHS): 3.9 % (ref 4–15)
RELATIVE NEUTROPHIL (OHS): 90.1 % (ref 38–73)
SODIUM SERPL-SCNC: 127 MMOL/L (ref 136–145)
VANCOMYCIN SERPL-MCNC: 21.8 UG/ML
WBC # BLD AUTO: 10.08 K/UL (ref 3.9–12.7)

## 2025-08-20 PROCEDURE — 99233 SBSQ HOSP IP/OBS HIGH 50: CPT | Mod: ,,, | Performed by: PHYSICIAN ASSISTANT

## 2025-08-20 PROCEDURE — 25000003 PHARM REV CODE 250: Performed by: PHYSICIAN ASSISTANT

## 2025-08-20 PROCEDURE — 63600175 PHARM REV CODE 636 W HCPCS: Performed by: PHYSICIAN ASSISTANT

## 2025-08-20 PROCEDURE — 63600175 PHARM REV CODE 636 W HCPCS: Performed by: INTERNAL MEDICINE

## 2025-08-20 PROCEDURE — 84100 ASSAY OF PHOSPHORUS: CPT | Performed by: PHYSICIAN ASSISTANT

## 2025-08-20 PROCEDURE — 85025 COMPLETE CBC W/AUTO DIFF WBC: CPT | Performed by: PHYSICIAN ASSISTANT

## 2025-08-20 PROCEDURE — 83735 ASSAY OF MAGNESIUM: CPT | Performed by: PHYSICIAN ASSISTANT

## 2025-08-20 PROCEDURE — 63600175 PHARM REV CODE 636 W HCPCS

## 2025-08-20 PROCEDURE — 96367 TX/PROPH/DG ADDL SEQ IV INF: CPT

## 2025-08-20 PROCEDURE — 25000003 PHARM REV CODE 250

## 2025-08-20 PROCEDURE — 96376 TX/PRO/DX INJ SAME DRUG ADON: CPT

## 2025-08-20 PROCEDURE — 80053 COMPREHEN METABOLIC PANEL: CPT | Performed by: INTERNAL MEDICINE

## 2025-08-20 PROCEDURE — 94761 N-INVAS EAR/PLS OXIMETRY MLT: CPT

## 2025-08-20 PROCEDURE — 80202 ASSAY OF VANCOMYCIN: CPT | Performed by: INTERNAL MEDICINE

## 2025-08-20 PROCEDURE — 63600175 PHARM REV CODE 636 W HCPCS: Performed by: STUDENT IN AN ORGANIZED HEALTH CARE EDUCATION/TRAINING PROGRAM

## 2025-08-20 PROCEDURE — 11000001 HC ACUTE MED/SURG PRIVATE ROOM

## 2025-08-20 PROCEDURE — 99232 SBSQ HOSP IP/OBS MODERATE 35: CPT | Mod: NSCH,GC,, | Performed by: STUDENT IN AN ORGANIZED HEALTH CARE EDUCATION/TRAINING PROGRAM

## 2025-08-20 PROCEDURE — 25000003 PHARM REV CODE 250: Performed by: INTERNAL MEDICINE

## 2025-08-20 PROCEDURE — 96366 THER/PROPH/DIAG IV INF ADDON: CPT

## 2025-08-20 PROCEDURE — 99232 SBSQ HOSP IP/OBS MODERATE 35: CPT | Mod: GC,,,

## 2025-08-20 RX ORDER — MAGNESIUM SULFATE HEPTAHYDRATE 40 MG/ML
2 INJECTION, SOLUTION INTRAVENOUS ONCE
Status: DISCONTINUED | OUTPATIENT
Start: 2025-08-20 | End: 2025-08-20

## 2025-08-20 RX ORDER — GABAPENTIN 300 MG/1
300 CAPSULE ORAL NIGHTLY
Status: DISCONTINUED | OUTPATIENT
Start: 2025-08-20 | End: 2025-08-22 | Stop reason: HOSPADM

## 2025-08-20 RX ORDER — ACETAMINOPHEN 500 MG
1000 TABLET ORAL EVERY 6 HOURS PRN
Status: DISCONTINUED | OUTPATIENT
Start: 2025-08-20 | End: 2025-08-22 | Stop reason: HOSPADM

## 2025-08-20 RX ORDER — SODIUM CHLORIDE 9 MG/ML
INJECTION, SOLUTION INTRAVENOUS ONCE
Status: CANCELLED | OUTPATIENT
Start: 2025-08-21 | End: 2025-08-21

## 2025-08-20 RX ORDER — BRIMONIDINE TARTRATE 2 MG/ML
1 SOLUTION/ DROPS OPHTHALMIC 2 TIMES DAILY
Status: DISCONTINUED | OUTPATIENT
Start: 2025-08-20 | End: 2025-08-22 | Stop reason: HOSPADM

## 2025-08-20 RX ORDER — MUPIROCIN 20 MG/G
OINTMENT TOPICAL 2 TIMES DAILY
Status: CANCELLED | OUTPATIENT
Start: 2025-08-21 | End: 2025-08-26

## 2025-08-20 RX ORDER — SODIUM CHLORIDE 9 MG/ML
INJECTION, SOLUTION INTRAVENOUS
Status: CANCELLED | OUTPATIENT
Start: 2025-08-21

## 2025-08-20 RX ORDER — MAGNESIUM SULFATE HEPTAHYDRATE 40 MG/ML
2 INJECTION, SOLUTION INTRAVENOUS ONCE
Status: COMPLETED | OUTPATIENT
Start: 2025-08-20 | End: 2025-08-20

## 2025-08-20 RX ORDER — PANTOPRAZOLE SODIUM 40 MG/1
40 TABLET, DELAYED RELEASE ORAL DAILY
Status: DISCONTINUED | OUTPATIENT
Start: 2025-08-21 | End: 2025-08-22 | Stop reason: HOSPADM

## 2025-08-20 RX ORDER — GLUCAGON 1 MG
1 KIT INJECTION
Status: DISCONTINUED | OUTPATIENT
Start: 2025-08-20 | End: 2025-08-22 | Stop reason: HOSPADM

## 2025-08-20 RX ORDER — BUTALBITAL, ACETAMINOPHEN AND CAFFEINE 50; 325; 40 MG/1; MG/1; MG/1
1 TABLET ORAL EVERY 4 HOURS PRN
Status: DISCONTINUED | OUTPATIENT
Start: 2025-08-20 | End: 2025-08-22 | Stop reason: HOSPADM

## 2025-08-20 RX ORDER — INSULIN ASPART 100 [IU]/ML
0-5 INJECTION, SOLUTION INTRAVENOUS; SUBCUTANEOUS EVERY 6 HOURS PRN
Status: DISCONTINUED | OUTPATIENT
Start: 2025-08-20 | End: 2025-08-22 | Stop reason: HOSPADM

## 2025-08-20 RX ADMIN — PRAVASTATIN SODIUM 40 MG: 40 TABLET ORAL at 08:08

## 2025-08-20 RX ADMIN — METOPROLOL SUCCINATE 25 MG: 25 TABLET, EXTENDED RELEASE ORAL at 08:08

## 2025-08-20 RX ADMIN — ALLOPURINOL 100 MG: 100 TABLET ORAL at 08:08

## 2025-08-20 RX ADMIN — APIXABAN 5 MG: 5 TABLET, FILM COATED ORAL at 08:08

## 2025-08-20 RX ADMIN — PREDNISONE 20 MG: 20 TABLET ORAL at 08:08

## 2025-08-20 RX ADMIN — HYDROMORPHONE HYDROCHLORIDE 0.5 MG: 1 INJECTION, SOLUTION INTRAMUSCULAR; INTRAVENOUS; SUBCUTANEOUS at 08:08

## 2025-08-20 RX ADMIN — AMIODARONE HYDROCHLORIDE 200 MG: 200 TABLET ORAL at 08:08

## 2025-08-20 RX ADMIN — BUTALBITAL, ACETAMINOPHEN, AND CAFFEINE 1 TABLET: 325; 50; 40 TABLET ORAL at 11:08

## 2025-08-20 RX ADMIN — INSULIN ASPART 1 UNITS: 100 INJECTION, SOLUTION INTRAVENOUS; SUBCUTANEOUS at 08:08

## 2025-08-20 RX ADMIN — MAGNESIUM SULFATE HEPTAHYDRATE 2 G: 40 INJECTION, SOLUTION INTRAVENOUS at 07:08

## 2025-08-20 RX ADMIN — PANTOPRAZOLE SODIUM 40 MG: 40 INJECTION, POWDER, FOR SOLUTION INTRAVENOUS at 08:08

## 2025-08-20 RX ADMIN — GABAPENTIN 300 MG: 300 CAPSULE ORAL at 09:08

## 2025-08-20 RX ADMIN — LEVOTHYROXINE SODIUM 25 MCG: 0.03 TABLET ORAL at 05:08

## 2025-08-20 RX ADMIN — TAMSULOSIN HYDROCHLORIDE 0.4 MG: 0.4 CAPSULE ORAL at 08:08

## 2025-08-20 RX ADMIN — MAGNESIUM SULFATE HEPTAHYDRATE 2 G: 40 INJECTION, SOLUTION INTRAVENOUS at 05:08

## 2025-08-20 RX ADMIN — PIPERACILLIN AND TAZOBACTAM 4.5 G: 4; .5 INJECTION, POWDER, LYOPHILIZED, FOR SOLUTION INTRAVENOUS; PARENTERAL at 09:08

## 2025-08-20 RX ADMIN — Medication 6 MG: at 08:08

## 2025-08-20 RX ADMIN — OXYCODONE HYDROCHLORIDE 5 MG: 5 TABLET ORAL at 08:08

## 2025-08-20 RX ADMIN — ACETAMINOPHEN 1000 MG: 500 TABLET ORAL at 05:08

## 2025-08-21 ENCOUNTER — PATIENT MESSAGE (OUTPATIENT)
Dept: ORTHOPEDICS | Facility: CLINIC | Age: 75
End: 2025-08-21
Payer: MEDICARE

## 2025-08-21 LAB
ABSOLUTE EOSINOPHIL (OHS): 0 K/UL
ABSOLUTE MONOCYTE (OHS): 0.22 K/UL (ref 0.3–1)
ABSOLUTE NEUTROPHIL COUNT (OHS): 7 K/UL (ref 1.8–7.7)
ALBUMIN SERPL BCP-MCNC: 1.8 G/DL (ref 3.5–5.2)
ALP SERPL-CCNC: 90 UNIT/L (ref 40–150)
ALT SERPL W/O P-5'-P-CCNC: <8 UNIT/L (ref 0–55)
ANION GAP (OHS): 12 MMOL/L (ref 8–16)
AST SERPL-CCNC: 14 UNIT/L (ref 0–50)
BASOPHILS # BLD AUTO: 0 K/UL
BASOPHILS NFR BLD AUTO: 0 %
BILIRUB SERPL-MCNC: 0.6 MG/DL (ref 0.1–1)
BUN SERPL-MCNC: 31 MG/DL (ref 8–23)
CALCIUM SERPL-MCNC: 7.8 MG/DL (ref 8.7–10.5)
CHLORIDE SERPL-SCNC: 94 MMOL/L (ref 95–110)
CO2 SERPL-SCNC: 19 MMOL/L (ref 23–29)
CREAT SERPL-MCNC: 5.1 MG/DL (ref 0.5–1.4)
ERYTHROCYTE [DISTWIDTH] IN BLOOD BY AUTOMATED COUNT: 15.1 % (ref 11.5–14.5)
GFR SERPLBLD CREATININE-BSD FMLA CKD-EPI: 11 ML/MIN/1.73/M2
GLUCOSE SERPL-MCNC: 153 MG/DL (ref 70–110)
HBV SURFACE AG SERPL QL IA: NORMAL
HCT VFR BLD AUTO: 34 % (ref 40–54)
HGB BLD-MCNC: 10.5 GM/DL (ref 14–18)
IMM GRANULOCYTES # BLD AUTO: 0.02 K/UL (ref 0–0.04)
IMM GRANULOCYTES NFR BLD AUTO: 0.3 % (ref 0–0.5)
LYMPHOCYTES # BLD AUTO: 0.37 K/UL (ref 1–4.8)
MCH RBC QN AUTO: 30.5 PG (ref 27–31)
MCHC RBC AUTO-ENTMCNC: 30.9 G/DL (ref 32–36)
MCV RBC AUTO: 99 FL (ref 82–98)
NUCLEATED RBC (/100WBC) (OHS): 0 /100 WBC
OSMOLALITY SERPL: 277 MOSM/KG (ref 280–300)
OSMOLALITY UR: 209 MOSM/KG (ref 50–1200)
PLATELET # BLD AUTO: 223 K/UL (ref 150–450)
PMV BLD AUTO: 9.3 FL (ref 9.2–12.9)
POCT GLUCOSE: 120 MG/DL (ref 70–110)
POCT GLUCOSE: 150 MG/DL (ref 70–110)
POCT GLUCOSE: 162 MG/DL (ref 70–110)
POCT GLUCOSE: 172 MG/DL (ref 70–110)
POTASSIUM SERPL-SCNC: 4.9 MMOL/L (ref 3.5–5.1)
PROT SERPL-MCNC: 4.9 GM/DL (ref 6–8.4)
RBC # BLD AUTO: 3.44 M/UL (ref 4.6–6.2)
RELATIVE EOSINOPHIL (OHS): 0 %
RELATIVE LYMPHOCYTE (OHS): 4.9 % (ref 18–48)
RELATIVE MONOCYTE (OHS): 2.9 % (ref 4–15)
RELATIVE NEUTROPHIL (OHS): 91.9 % (ref 38–73)
SODIUM SERPL-SCNC: 125 MMOL/L (ref 136–145)
SODIUM UR-SCNC: 29 MMOL/L (ref 20–250)
WBC # BLD AUTO: 7.61 K/UL (ref 3.9–12.7)

## 2025-08-21 PROCEDURE — 36415 COLL VENOUS BLD VENIPUNCTURE: CPT | Performed by: INTERNAL MEDICINE

## 2025-08-21 PROCEDURE — 83930 ASSAY OF BLOOD OSMOLALITY: CPT

## 2025-08-21 PROCEDURE — 36415 COLL VENOUS BLD VENIPUNCTURE: CPT

## 2025-08-21 PROCEDURE — 25000003 PHARM REV CODE 250: Performed by: PHYSICIAN ASSISTANT

## 2025-08-21 PROCEDURE — 90935 HEMODIALYSIS ONE EVALUATION: CPT

## 2025-08-21 PROCEDURE — 99232 SBSQ HOSP IP/OBS MODERATE 35: CPT | Mod: ,,, | Performed by: INTERNAL MEDICINE

## 2025-08-21 PROCEDURE — 84300 ASSAY OF URINE SODIUM: CPT

## 2025-08-21 PROCEDURE — 63600175 PHARM REV CODE 636 W HCPCS: Performed by: PHYSICIAN ASSISTANT

## 2025-08-21 PROCEDURE — 83935 ASSAY OF URINE OSMOLALITY: CPT

## 2025-08-21 PROCEDURE — 63600175 PHARM REV CODE 636 W HCPCS

## 2025-08-21 PROCEDURE — 94761 N-INVAS EAR/PLS OXIMETRY MLT: CPT

## 2025-08-21 PROCEDURE — 85025 COMPLETE CBC W/AUTO DIFF WBC: CPT | Performed by: PHYSICIAN ASSISTANT

## 2025-08-21 PROCEDURE — 80053 COMPREHEN METABOLIC PANEL: CPT | Performed by: INTERNAL MEDICINE

## 2025-08-21 PROCEDURE — 96366 THER/PROPH/DIAG IV INF ADDON: CPT

## 2025-08-21 PROCEDURE — 25000003 PHARM REV CODE 250: Performed by: NURSE PRACTITIONER

## 2025-08-21 PROCEDURE — 21400001 HC TELEMETRY ROOM

## 2025-08-21 PROCEDURE — 87340 HEPATITIS B SURFACE AG IA: CPT

## 2025-08-21 PROCEDURE — 25000003 PHARM REV CODE 250

## 2025-08-21 RX ORDER — SODIUM CHLORIDE 9 MG/ML
INJECTION, SOLUTION INTRAVENOUS
Status: CANCELLED | OUTPATIENT
Start: 2025-08-21

## 2025-08-21 RX ORDER — HEPARIN SODIUM 1000 [USP'U]/ML
1000 INJECTION, SOLUTION INTRAVENOUS; SUBCUTANEOUS
Status: DISCONTINUED | OUTPATIENT
Start: 2025-08-22 | End: 2025-08-22 | Stop reason: HOSPADM

## 2025-08-21 RX ORDER — SODIUM CHLORIDE 9 MG/ML
INJECTION, SOLUTION INTRAVENOUS ONCE
Status: CANCELLED | OUTPATIENT
Start: 2025-08-21 | End: 2025-08-21

## 2025-08-21 RX ORDER — HEPARIN SODIUM 1000 [USP'U]/ML
1000 INJECTION, SOLUTION INTRAVENOUS; SUBCUTANEOUS
Status: DISPENSED | OUTPATIENT
Start: 2025-08-21 | End: 2025-08-22

## 2025-08-21 RX ORDER — MUPIROCIN 20 MG/G
OINTMENT TOPICAL 2 TIMES DAILY
Status: CANCELLED | OUTPATIENT
Start: 2025-08-21 | End: 2025-08-26

## 2025-08-21 RX ADMIN — APIXABAN 5 MG: 5 TABLET, FILM COATED ORAL at 09:08

## 2025-08-21 RX ADMIN — BRIMONIDINE TARTRATE 1 DROP: 2 SOLUTION OPHTHALMIC at 08:08

## 2025-08-21 RX ADMIN — OXYCODONE HYDROCHLORIDE 10 MG: 10 TABLET ORAL at 04:08

## 2025-08-21 RX ADMIN — APIXABAN 5 MG: 5 TABLET, FILM COATED ORAL at 08:08

## 2025-08-21 RX ADMIN — PRAVASTATIN SODIUM 40 MG: 40 TABLET ORAL at 08:08

## 2025-08-21 RX ADMIN — HYDROMORPHONE HYDROCHLORIDE 0.5 MG: 1 INJECTION, SOLUTION INTRAMUSCULAR; INTRAVENOUS; SUBCUTANEOUS at 09:08

## 2025-08-21 RX ADMIN — PREDNISONE 20 MG: 20 TABLET ORAL at 09:08

## 2025-08-21 RX ADMIN — TAMSULOSIN HYDROCHLORIDE 0.4 MG: 0.4 CAPSULE ORAL at 08:08

## 2025-08-21 RX ADMIN — HYDROMORPHONE HYDROCHLORIDE 0.5 MG: 1 INJECTION, SOLUTION INTRAMUSCULAR; INTRAVENOUS; SUBCUTANEOUS at 12:08

## 2025-08-21 RX ADMIN — PANTOPRAZOLE SODIUM 40 MG: 40 TABLET, DELAYED RELEASE ORAL at 08:08

## 2025-08-21 RX ADMIN — HEPARIN SODIUM 1000 UNITS: 1000 INJECTION INTRAVENOUS; SUBCUTANEOUS at 02:08

## 2025-08-21 RX ADMIN — BRIMONIDINE TARTRATE 1 DROP: 2 SOLUTION OPHTHALMIC at 09:08

## 2025-08-21 RX ADMIN — AMIODARONE HYDROCHLORIDE 200 MG: 200 TABLET ORAL at 08:08

## 2025-08-21 RX ADMIN — PREDNISONE 20 MG: 20 TABLET ORAL at 08:08

## 2025-08-21 RX ADMIN — ALLOPURINOL 100 MG: 100 TABLET ORAL at 08:08

## 2025-08-21 RX ADMIN — GABAPENTIN 300 MG: 300 CAPSULE ORAL at 09:08

## 2025-08-21 RX ADMIN — LEVOTHYROXINE SODIUM 25 MCG: 0.03 TABLET ORAL at 05:08

## 2025-08-21 RX ADMIN — BRIMONIDINE TARTRATE 1 DROP: 2 SOLUTION OPHTHALMIC at 12:08

## 2025-08-22 ENCOUNTER — DOCUMENTATION ONLY (OUTPATIENT)
Dept: CARDIOLOGY | Facility: CLINIC | Age: 75
End: 2025-08-22
Payer: MEDICARE

## 2025-08-22 VITALS
SYSTOLIC BLOOD PRESSURE: 151 MMHG | TEMPERATURE: 97 F | HEART RATE: 66 BPM | DIASTOLIC BLOOD PRESSURE: 72 MMHG | WEIGHT: 183 LBS | HEIGHT: 74 IN | BODY MASS INDEX: 23.49 KG/M2 | OXYGEN SATURATION: 92 % | RESPIRATION RATE: 17 BRPM

## 2025-08-22 LAB
ABSOLUTE EOSINOPHIL (OHS): 0 K/UL
ABSOLUTE MONOCYTE (OHS): 0.29 K/UL (ref 0.3–1)
ABSOLUTE NEUTROPHIL COUNT (OHS): 7.34 K/UL (ref 1.8–7.7)
ALBUMIN SERPL BCP-MCNC: 2 G/DL (ref 3.5–5.2)
ALP SERPL-CCNC: 92 UNIT/L (ref 40–150)
ALT SERPL W/O P-5'-P-CCNC: <8 UNIT/L (ref 0–55)
ANION GAP (OHS): 10 MMOL/L (ref 8–16)
AST SERPL-CCNC: 16 UNIT/L (ref 0–50)
BACTERIA SPEC AEROBE CULT: NO GROWTH
BASOPHILS # BLD AUTO: 0 K/UL
BASOPHILS NFR BLD AUTO: 0 %
BILIRUB SERPL-MCNC: 0.4 MG/DL (ref 0.1–1)
BUN SERPL-MCNC: 29 MG/DL (ref 8–23)
CALCIUM SERPL-MCNC: 7.8 MG/DL (ref 8.7–10.5)
CHLORIDE SERPL-SCNC: 99 MMOL/L (ref 95–110)
CHLORPROPAMIDE SERPL QL: NEGATIVE
CO2 SERPL-SCNC: 24 MMOL/L (ref 23–29)
CREAT SERPL-MCNC: 3.9 MG/DL (ref 0.5–1.4)
ERYTHROCYTE [DISTWIDTH] IN BLOOD BY AUTOMATED COUNT: 15.1 % (ref 11.5–14.5)
GFR SERPLBLD CREATININE-BSD FMLA CKD-EPI: 15 ML/MIN/1.73/M2
GLIMEPIRIDE SERPL QL: NEGATIVE
GLIPIZIDE SERPL QL: NEGATIVE
GLUCOSE SERPL-MCNC: 148 MG/DL (ref 70–110)
GLYBURIDE SERPL QL: NEGATIVE
HCT VFR BLD AUTO: 35.6 % (ref 40–54)
HGB BLD-MCNC: 11.1 GM/DL (ref 14–18)
IMM GRANULOCYTES # BLD AUTO: 0.04 K/UL (ref 0–0.04)
IMM GRANULOCYTES NFR BLD AUTO: 0.5 % (ref 0–0.5)
LYMPHOCYTES # BLD AUTO: 0.4 K/UL (ref 1–4.8)
M PIOGLITAZONE: NEGATIVE
M ROSIGLITAZONE: NEGATIVE
MCH RBC QN AUTO: 30.4 PG (ref 27–31)
MCHC RBC AUTO-ENTMCNC: 31.2 G/DL (ref 32–36)
MCV RBC AUTO: 98 FL (ref 82–98)
NATEGLINIDE SERPL QL: NEGATIVE
NUCLEATED RBC (/100WBC) (OHS): 0 /100 WBC
PLATELET # BLD AUTO: 237 K/UL (ref 150–450)
PMV BLD AUTO: 9.2 FL (ref 9.2–12.9)
POCT GLUCOSE: 151 MG/DL (ref 70–110)
POCT GLUCOSE: 155 MG/DL (ref 70–110)
POCT GLUCOSE: 190 MG/DL (ref 70–110)
POTASSIUM SERPL-SCNC: 4.5 MMOL/L (ref 3.5–5.1)
PROT SERPL-MCNC: 5.3 GM/DL (ref 6–8.4)
RBC # BLD AUTO: 3.65 M/UL (ref 4.6–6.2)
RELATIVE EOSINOPHIL (OHS): 0 %
RELATIVE LYMPHOCYTE (OHS): 5 % (ref 18–48)
RELATIVE MONOCYTE (OHS): 3.6 % (ref 4–15)
RELATIVE NEUTROPHIL (OHS): 90.9 % (ref 38–73)
REPAGLINIDE SERPL QL: NEGATIVE
SODIUM SERPL-SCNC: 133 MMOL/L (ref 136–145)
TOLAZAMIDE SERPL QL: NEGATIVE
TOLBUTAMIDE SERPL QL: NEGATIVE
WBC # BLD AUTO: 8.07 K/UL (ref 3.9–12.7)

## 2025-08-22 PROCEDURE — 80053 COMPREHEN METABOLIC PANEL: CPT | Performed by: INTERNAL MEDICINE

## 2025-08-22 PROCEDURE — 25000003 PHARM REV CODE 250: Performed by: PHYSICIAN ASSISTANT

## 2025-08-22 PROCEDURE — 85025 COMPLETE CBC W/AUTO DIFF WBC: CPT | Performed by: PHYSICIAN ASSISTANT

## 2025-08-22 PROCEDURE — 63600175 PHARM REV CODE 636 W HCPCS: Performed by: PHYSICIAN ASSISTANT

## 2025-08-22 PROCEDURE — 25000003 PHARM REV CODE 250

## 2025-08-22 PROCEDURE — 97161 PT EVAL LOW COMPLEX 20 MIN: CPT

## 2025-08-22 PROCEDURE — 36415 COLL VENOUS BLD VENIPUNCTURE: CPT | Performed by: PHYSICIAN ASSISTANT

## 2025-08-22 PROCEDURE — 97165 OT EVAL LOW COMPLEX 30 MIN: CPT

## 2025-08-22 RX ORDER — MUPIROCIN 20 MG/G
OINTMENT TOPICAL 2 TIMES DAILY
Status: CANCELLED | OUTPATIENT
Start: 2025-08-22 | End: 2025-08-27

## 2025-08-22 RX ORDER — PANTOPRAZOLE SODIUM 40 MG/1
40 TABLET, DELAYED RELEASE ORAL
Qty: 90 TABLET | Refills: 3 | OUTPATIENT
Start: 2025-08-22

## 2025-08-22 RX ORDER — SODIUM CHLORIDE 9 MG/ML
INJECTION, SOLUTION INTRAVENOUS ONCE
Status: CANCELLED | OUTPATIENT
Start: 2025-08-22 | End: 2025-08-22

## 2025-08-22 RX ORDER — PREDNISONE 5 MG/1
TABLET ORAL
Qty: 63 TABLET | Refills: 0 | Status: SHIPPED | OUTPATIENT
Start: 2025-08-22 | End: 2025-09-06

## 2025-08-22 RX ORDER — SODIUM CHLORIDE 9 MG/ML
INJECTION, SOLUTION INTRAVENOUS
Status: CANCELLED | OUTPATIENT
Start: 2025-08-22

## 2025-08-22 RX ADMIN — ALLOPURINOL 100 MG: 100 TABLET ORAL at 08:08

## 2025-08-22 RX ADMIN — PREDNISONE 20 MG: 20 TABLET ORAL at 08:08

## 2025-08-22 RX ADMIN — PRAVASTATIN SODIUM 40 MG: 40 TABLET ORAL at 08:08

## 2025-08-22 RX ADMIN — PANTOPRAZOLE SODIUM 40 MG: 40 TABLET, DELAYED RELEASE ORAL at 08:08

## 2025-08-22 RX ADMIN — OXYCODONE HYDROCHLORIDE 10 MG: 10 TABLET ORAL at 05:08

## 2025-08-22 RX ADMIN — METOPROLOL SUCCINATE 25 MG: 25 TABLET, EXTENDED RELEASE ORAL at 08:08

## 2025-08-22 RX ADMIN — APIXABAN 5 MG: 5 TABLET, FILM COATED ORAL at 08:08

## 2025-08-22 RX ADMIN — BRIMONIDINE TARTRATE 1 DROP: 2 SOLUTION OPHTHALMIC at 08:08

## 2025-08-22 RX ADMIN — ACETAMINOPHEN 1000 MG: 500 TABLET ORAL at 02:08

## 2025-08-22 RX ADMIN — AMIODARONE HYDROCHLORIDE 200 MG: 200 TABLET ORAL at 08:08

## 2025-08-22 RX ADMIN — TAMSULOSIN HYDROCHLORIDE 0.4 MG: 0.4 CAPSULE ORAL at 08:08

## 2025-08-22 RX ADMIN — LEVOTHYROXINE SODIUM 25 MCG: 0.03 TABLET ORAL at 05:08

## 2025-08-24 LAB
BACTERIA BLD CULT: NORMAL
BACTERIA BLD CULT: NORMAL

## 2025-08-25 ENCOUNTER — PATIENT OUTREACH (OUTPATIENT)
Dept: ADMINISTRATIVE | Facility: CLINIC | Age: 75
End: 2025-08-25
Payer: MEDICARE

## 2025-08-26 LAB — BACTERIA SPEC ANAEROBE CULT: NORMAL

## 2025-08-28 ENCOUNTER — TELEPHONE (OUTPATIENT)
Dept: SURGERY | Facility: CLINIC | Age: 75
End: 2025-08-28
Payer: MEDICARE

## 2025-08-28 ENCOUNTER — PATIENT MESSAGE (OUTPATIENT)
Dept: RHEUMATOLOGY | Facility: CLINIC | Age: 75
End: 2025-08-28
Payer: MEDICARE

## 2025-08-30 LAB — POCT GLUCOSE: 243 MG/DL (ref 70–110)

## 2025-09-02 ENCOUNTER — OFFICE VISIT (OUTPATIENT)
Dept: WOUND CARE | Facility: CLINIC | Age: 75
End: 2025-09-02
Payer: MEDICARE

## 2025-09-02 DIAGNOSIS — N18.4 CKD (CHRONIC KIDNEY DISEASE), STAGE IV: ICD-10-CM

## 2025-09-02 DIAGNOSIS — N18.32 TYPE 2 DIABETES MELLITUS WITH STAGE 3B CHRONIC KIDNEY DISEASE, WITHOUT LONG-TERM CURRENT USE OF INSULIN: ICD-10-CM

## 2025-09-02 DIAGNOSIS — Z43.2 ATTENTION TO ILEOSTOMY: Primary | ICD-10-CM

## 2025-09-02 DIAGNOSIS — E11.22 TYPE 2 DIABETES MELLITUS WITH STAGE 3B CHRONIC KIDNEY DISEASE, WITHOUT LONG-TERM CURRENT USE OF INSULIN: ICD-10-CM

## 2025-09-02 PROCEDURE — 99999 PR PBB SHADOW E&M-EST. PATIENT-LVL II: CPT | Mod: PBBFAC,,, | Performed by: CLINICAL NURSE SPECIALIST

## 2025-09-02 PROCEDURE — 1160F RVW MEDS BY RX/DR IN RCRD: CPT | Mod: CPTII,S$GLB,, | Performed by: CLINICAL NURSE SPECIALIST

## 2025-09-02 PROCEDURE — 1159F MED LIST DOCD IN RCRD: CPT | Mod: CPTII,S$GLB,, | Performed by: CLINICAL NURSE SPECIALIST

## 2025-09-02 PROCEDURE — 99215 OFFICE O/P EST HI 40 MIN: CPT | Mod: S$GLB,,, | Performed by: CLINICAL NURSE SPECIALIST

## 2025-09-02 PROCEDURE — 1111F DSCHRG MED/CURRENT MED MERGE: CPT | Mod: CPTII,S$GLB,, | Performed by: CLINICAL NURSE SPECIALIST

## 2025-09-02 RX ORDER — LOPERAMIDE HYDROCHLORIDE 2 MG/1
4 CAPSULE ORAL 4 TIMES DAILY PRN
Qty: 240 CAPSULE | Refills: 0 | Status: ON HOLD | OUTPATIENT
Start: 2025-09-02 | End: 2025-10-02

## 2025-09-03 PROBLEM — E80.6 HYPERBILIRUBINEMIA: Status: ACTIVE | Noted: 2025-09-03

## 2025-09-03 PROBLEM — R74.01 TRANSAMINITIS: Status: ACTIVE | Noted: 2025-09-03

## 2025-09-03 PROBLEM — R10.13 EPIGASTRIC PAIN: Status: ACTIVE | Noted: 2025-09-03

## 2025-09-04 PROBLEM — I48.0 PAROXYSMAL A-FIB: Status: ACTIVE | Noted: 2025-09-04

## 2025-09-04 PROBLEM — I25.10 CAD (CORONARY ARTERY DISEASE): Status: ACTIVE | Noted: 2025-09-04

## 2025-09-04 PROBLEM — Z93.3 S/P COLOSTOMY: Status: ACTIVE | Noted: 2025-09-04

## 2025-09-04 PROBLEM — R10.9 ABDOMINAL PAIN: Status: ACTIVE | Noted: 2025-09-04

## 2025-09-04 PROBLEM — I50.42 CHRONIC COMBINED SYSTOLIC AND DIASTOLIC HEART FAILURE: Status: ACTIVE | Noted: 2025-09-04

## 2025-09-04 PROBLEM — M19.90 OSTEOARTHRITIS: Status: ACTIVE | Noted: 2025-09-04

## 2025-09-04 PROBLEM — E44.0 MODERATE PROTEIN-CALORIE MALNUTRITION: Status: RESOLVED | Noted: 2024-10-03 | Resolved: 2025-09-04

## 2025-09-04 PROBLEM — E44.0 MODERATE MALNUTRITION: Status: ACTIVE | Noted: 2024-10-03

## 2025-09-04 PROBLEM — E11.29 TYPE 2 DIABETES MELLITUS WITH RENAL COMPLICATION: Status: ACTIVE | Noted: 2025-09-04

## 2025-09-04 PROBLEM — N18.6 ESRD (END STAGE RENAL DISEASE): Status: ACTIVE | Noted: 2025-09-04

## 2025-09-04 PROBLEM — E44.0 MODERATE MALNUTRITION: Status: RESOLVED | Noted: 2025-09-04 | Resolved: 2025-09-04

## 2025-09-04 PROBLEM — E44.0 MODERATE MALNUTRITION: Status: ACTIVE | Noted: 2025-09-04

## 2025-09-04 PROBLEM — R82.90 ABNORMAL URINALYSIS: Status: ACTIVE | Noted: 2025-09-04

## 2025-09-05 PROBLEM — R19.7 DIARRHEA: Status: ACTIVE | Noted: 2025-09-05

## 2025-09-05 PROBLEM — I95.9 HYPOTENSION: Status: ACTIVE | Noted: 2025-09-05

## 2025-09-05 PROBLEM — K50.90 CROHN'S DISEASE: Status: ACTIVE | Noted: 2025-09-05

## 2025-09-05 PROBLEM — K83.8 DILATION OF BILIARY TRACT: Status: ACTIVE | Noted: 2025-09-05

## 2025-09-05 PROBLEM — D53.9 MACROCYTIC ANEMIA: Status: ACTIVE | Noted: 2025-09-05

## 2025-09-05 PROBLEM — N39.0 UTI (URINARY TRACT INFECTION): Status: ACTIVE | Noted: 2025-09-05

## (undated) DEVICE — TROCAR ENDOPATH XCEL 5X75MM

## (undated) DEVICE — SUT COATED VICRYL 4/0 27IN

## (undated) DEVICE — BAG TISSUE RETRIEVAL 225ML

## (undated) DEVICE — EVACUATOR WOUND BULB 100CC

## (undated) DEVICE — CABLE CONNECT CATH PFA RX HRD

## (undated) DEVICE — PROBE CATH TEMP 16 FRFOLEY 400

## (undated) DEVICE — CONTAINER MULTIPURPOSE/SPECIME

## (undated) DEVICE — STAPLER SKIN PROXIMATE WIDE

## (undated) DEVICE — SEE MEDLINE ITEM 146417

## (undated) DEVICE — SEE MEDLINE ITEM 157117

## (undated) DEVICE — LUBRICANT SURGILUBE 2 OZ

## (undated) DEVICE — SEE MEDLINE ITEM 156902

## (undated) DEVICE — TRAY FOLEY 16FR INFECTION CONT

## (undated) DEVICE — TOWEL OR DISP STRL BLUE 4/PK

## (undated) DEVICE — ADHESIVE DERMABOND ADVANCED

## (undated) DEVICE — RETAINER VISCERA STER DISP LRG

## (undated) DEVICE — SET SINGLE BASIN

## (undated) DEVICE — LINE PRESSURE MONITORING 96IN

## (undated) DEVICE — SUT SILK 2-0 PS 18IN BLACK

## (undated) DEVICE — PAD DEFIB CADENCE ADULT R2

## (undated) DEVICE — SPONGE LAP 18X18 PREWASHED

## (undated) DEVICE — SEE MEDLINE ITEM 152487

## (undated) DEVICE — SUT 0 18IN COATED VICRYL V

## (undated) DEVICE — GUIDEWIRE ROSEN VAS JTIP 180CM

## (undated) DEVICE — SET Y-TYPE TUR IRRIGATION

## (undated) DEVICE — SEALER LIGASURE IMPACT 18CM

## (undated) DEVICE — WARMER DRAPE STERILE LF

## (undated) DEVICE — R CATH BIDIRECTIONL DF CRV 7FR

## (undated) DEVICE — DRAPE ABDOMINAL TIBURON 14X11

## (undated) DEVICE — SUT CTD VICRYL CT-1 27

## (undated) DEVICE — Device

## (undated) DEVICE — SUT 2/0 30IN SILK BLK BRAI

## (undated) DEVICE — SYR 10CC LUER LOCK

## (undated) DEVICE — PATCH CARTO REFERENCE

## (undated) DEVICE — DRAPE INCISE IOBAN 2 23X17IN

## (undated) DEVICE — TRAY CYSTO BASIN

## (undated) DEVICE — ELECTRODE REM PLYHSV RETURN 9

## (undated) DEVICE — HEMOSTAT SURGICEL FIBRLR 1X2IN

## (undated) DEVICE — DRESSING ABSRBNT ISLAND 3.6X8

## (undated) DEVICE — CATH ABLATION PULS FIELD 31MM

## (undated) DEVICE — SEE MEDLINE ITEM 157128

## (undated) DEVICE — DRESSING TEGADERM CHG 3.5X4.5

## (undated) DEVICE — SHEATH HEMOSTASIS 8.5FR

## (undated) DEVICE — DRAPE T THYROID STERILE

## (undated) DEVICE — HOOK LONE STAR BLUNT 12MM

## (undated) DEVICE — APPLICATOR CHLORAPREP ORN 26ML

## (undated) DEVICE — SET MICROPUNCTUREECHO STIFF

## (undated) DEVICE — APPLIER CLIP ENDO LIGAMAX 5MM

## (undated) DEVICE — R CATH ACUSON ACUNAV 8FR

## (undated) DEVICE — PACK CYSTO

## (undated) DEVICE — BASIN SPLASH SHLD W/PAD BLUE

## (undated) DEVICE — KIT PROBE COVER WITH GEL

## (undated) DEVICE — CLIP MED TICALL

## (undated) DEVICE — SKINMARKER & RULER REGULAR X-F

## (undated) DEVICE — GUIDEWIRE AMPLATZ .035INX180X7

## (undated) DEVICE — SEE MEDLINE ITEM 152622

## (undated) DEVICE — KIT PREVENA PLUS

## (undated) DEVICE — BLADE SURG #15 CARBON STEEL

## (undated) DEVICE — ELECTRODE NDL

## (undated) DEVICE — GOWN SURGICAL X-LARGE

## (undated) DEVICE — MANIFOLD 4 PORT

## (undated) DEVICE — ELECTRODE BLADE INSULATED 1 IN

## (undated) DEVICE — SCISSOR 5MMX35CM DIRECT DRIVE

## (undated) DEVICE — SEE MEDLINE ITEM 154981

## (undated) DEVICE — SYR ONLY LUER LOCK 20CC

## (undated) DEVICE — CATH PENTARY F 2-6-2MM 115CM

## (undated) DEVICE — SOL IRR NACL .9% 3000ML

## (undated) DEVICE — SUT CTD VICRYL 2-0 VIL BR

## (undated) DEVICE — SUT VICRYL PLUS 4-0 P3 18IN

## (undated) DEVICE — PACK EP DRAPE OMC

## (undated) DEVICE — INTRODUCER HEMOSTASIS 7.5F

## (undated) DEVICE — CATH THERMOCOOL SMTCH SF D F

## (undated) DEVICE — NDL TRNSSPTL BRK-1 18GA 98CM

## (undated) DEVICE — SUT 4/0 27IN COATED VICRYL

## (undated) DEVICE — SEE MEDLINE ITEM 146347

## (undated) DEVICE — INTRO 8.5FR 63CM SRO

## (undated) DEVICE — NDL 22GA X1 1/2 REG BEVEL

## (undated) DEVICE — SPONGE DERMA 8PLY 2X2

## (undated) DEVICE — TRAY MINOR GEN SURG

## (undated) DEVICE — COVER PRB TRNSDUC 7.6X183CM

## (undated) DEVICE — SEE MEDLINE ITEM 157144

## (undated) DEVICE — TROCAR ENDOPATH XCEL 5MM 7.5CM

## (undated) DEVICE — NDL BOX COUNTER

## (undated) DEVICE — SYR 50ML CATH TIP

## (undated) DEVICE — GAUZE SPONGE PEANUT STRL

## (undated) DEVICE — NDL HYPO REG 25G X 1 1/2

## (undated) DEVICE — GUIDEWIRE TORAY INOUE

## (undated) DEVICE — CONTAINER SPECIMEN STRL 4OZ

## (undated) DEVICE — TRAY ANGIO BAPTIST

## (undated) DEVICE — TROCAR ENDOPATH XCEL 11X100MM

## (undated) DEVICE — SUT 3-0 12-18IN SILK

## (undated) DEVICE — SET SMARTABLATE IRR TUBE

## (undated) DEVICE — BLADE SURGICAL GLASSVAN #12

## (undated) DEVICE — SUT 0 VICRYL / UR6 (J603)

## (undated) DEVICE — PROBE SIMULATOR KRAFF

## (undated) DEVICE — CLIP LIGATION MEDIUM CLIPS 1

## (undated) DEVICE — CATH RESPONSE QPLR JSN 6F 120

## (undated) DEVICE — CORD BIPOLAR 12 FOOT

## (undated) DEVICE — GLOVE SURGICAL LATEX SZ 8

## (undated) DEVICE — SUT MONOCRYL 4-0 PS-1 UND

## (undated) DEVICE — APPLIER LIGACLIP LG 13IN

## (undated) DEVICE — SHEET EENT SPLIT

## (undated) DEVICE — TROCAR ENDOPATH XCEL 11MM 10CM

## (undated) DEVICE — SUT 1 48IN PDS II VIO MONO

## (undated) DEVICE — SET DECANTER MEDICHOICE

## (undated) DEVICE — COVER LIGHT HANDLE 80/CA

## (undated) DEVICE — INTRODUCER AGILIS LG CURL

## (undated) DEVICE — SUT 2-0 12-18IN SILK

## (undated) DEVICE — DRAIN CHANNEL ROUND 10FR

## (undated) DEVICE — SUT VICRYL PLUS 3-0 SH 18IN

## (undated) DEVICE — STAPLER SKIN ROTATING HEAD

## (undated) DEVICE — SHEATH INTRODUCER 9FR 11CM

## (undated) DEVICE — URINARY DRAINAGE BAG

## (undated) DEVICE — SUT SILK 2.0 BLK 18

## (undated) DEVICE — DILATOR COONS TAPER 14FR

## (undated) DEVICE — DRAPE STERI INSTRUMENT 1018

## (undated) DEVICE — SUT CTD VICRYL VIL BR CR/SH

## (undated) DEVICE — SUT LIGACLIP SMALL XTRA

## (undated) DEVICE — DRESSING ADH ISLAND 3.6 X 14

## (undated) DEVICE — SEE MEDLINE ITEM 157194